# Patient Record
Sex: MALE | Race: WHITE | NOT HISPANIC OR LATINO | URBAN - METROPOLITAN AREA
[De-identification: names, ages, dates, MRNs, and addresses within clinical notes are randomized per-mention and may not be internally consistent; named-entity substitution may affect disease eponyms.]

---

## 2017-03-27 ENCOUNTER — APPOINTMENT (RX ONLY)
Dept: URBAN - METROPOLITAN AREA CLINIC 61 | Facility: CLINIC | Age: 67
Setting detail: DERMATOLOGY
End: 2017-03-27

## 2017-03-27 DIAGNOSIS — L57.0 ACTINIC KERATOSIS: ICD-10-CM

## 2017-03-27 DIAGNOSIS — D18.0 HEMANGIOMA: ICD-10-CM

## 2017-03-27 DIAGNOSIS — D485 NEOPLASM OF UNCERTAIN BEHAVIOR OF SKIN: ICD-10-CM

## 2017-03-27 PROBLEM — D18.01 HEMANGIOMA OF SKIN AND SUBCUTANEOUS TISSUE: Status: ACTIVE | Noted: 2017-03-27

## 2017-03-27 PROBLEM — D48.5 NEOPLASM OF UNCERTAIN BEHAVIOR OF SKIN: Status: ACTIVE | Noted: 2017-03-27

## 2017-03-27 PROCEDURE — ? BIOPSY BY SHAVE METHOD

## 2017-03-27 PROCEDURE — 11100: CPT | Mod: 59

## 2017-03-27 PROCEDURE — 17003 DESTRUCT PREMALG LES 2-14: CPT

## 2017-03-27 PROCEDURE — 99203 OFFICE O/P NEW LOW 30 MIN: CPT | Mod: 25

## 2017-03-27 PROCEDURE — ? LIQUID NITROGEN

## 2017-03-27 PROCEDURE — ? COUNSELING

## 2017-03-27 PROCEDURE — 17000 DESTRUCT PREMALG LESION: CPT

## 2017-03-27 ASSESSMENT — LOCATION SIMPLE DESCRIPTION DERM
LOCATION SIMPLE: SCALP
LOCATION SIMPLE: SCALP
LOCATION SIMPLE: POSTERIOR NECK
LOCATION SIMPLE: POSTERIOR SCALP
LOCATION SIMPLE: ANTERIOR SCALP
LOCATION SIMPLE: ANTERIOR SCALP
LOCATION SIMPLE: LEFT SCALP

## 2017-03-27 ASSESSMENT — LOCATION DETAILED DESCRIPTION DERM
LOCATION DETAILED: RIGHT SUPERIOR PARIETAL SCALP
LOCATION DETAILED: POSTERIOR MID-PARIETAL SCALP
LOCATION DETAILED: LEFT SUPERIOR PARIETAL SCALP
LOCATION DETAILED: RIGHT CENTRAL PARIETAL SCALP
LOCATION DETAILED: MID-FRONTAL SCALP
LOCATION DETAILED: LEFT CENTRAL PARIETAL SCALP
LOCATION DETAILED: LEFT CENTRAL FRONTAL SCALP
LOCATION DETAILED: LEFT INFERIOR POSTERIOR NECK
LOCATION DETAILED: MID-FRONTAL SCALP
LOCATION DETAILED: RIGHT CENTRAL PARIETAL SCALP
LOCATION DETAILED: RIGHT SUPERIOR PARIETAL SCALP
LOCATION DETAILED: LEFT CENTRAL PARIETAL SCALP

## 2017-03-27 ASSESSMENT — LOCATION ZONE DERM
LOCATION ZONE: SCALP
LOCATION ZONE: NECK
LOCATION ZONE: SCALP

## 2017-03-27 NOTE — PROCEDURE: LIQUID NITROGEN
Render Post-Care Instructions In Note?: yes
Post-Care Instructions: I reviewed with the patient in detail post-care instructions. Patient is to wear sunprotection, and avoid picking at any of the treated lesions. Pt may apply Vaseline to crusted or scabbing areas.
Consent: The patient's consent was obtained including but not limited to risks of crusting, scabbing, blistering, scarring, darker or lighter pigmentary change, recurrence, incomplete removal and infection.
Duration Of Freeze Thaw-Cycle (Seconds): 3
Total Number Of Aks Treated: 7
Number Of Freeze-Thaw Cycles: 1 freeze-thaw cycle
Detail Level: Zone

## 2017-03-27 NOTE — PROCEDURE: BIOPSY BY SHAVE METHOD
Detail Level: Detailed
Destruction After The Procedure: No
Render Post-Care Instructions In Note?: yes
Type Of Destruction Used: Curettage
Path Notes (To The Dermatopathologist): size: 0.6cm R/O: BCC
Biopsy Method: Dermablade
Hemostasis: Drysol
Consent: Written consent was obtained and risks were reviewed including but not limited to scarring, infection, bleeding, scabbing, incomplete removal, nerve damage and allergy to anesthesia.
Cryotherapy Text: The wound bed was treated with cryotherapy after the biopsy was performed.
Notification Instructions: Patient will be notified of biopsy results. However, patient instructed to call the office if not contacted within 2 weeks.
Body Location Override (Optional - Billing Will Still Be Based On Selected Body Map Location If Applicable): left posterior neck
X Size Of Lesion In Cm: 0
Wound Care: Bacitracin
Post-Care Instructions: I reviewed with the patient in detail post-care instructions. Patient is to keep the biopsy site dry overnight, and then apply bacitracin twice daily until healed. Patient may apply hydrogen peroxide soaks to remove any crusting.
Anesthesia Type: 1% lidocaine with epinephrine
Lab: [id number] [facility]
Electrodesiccation And Curettage Text: The wound bed was treated with electrodesiccation and curettage after the biopsy was performed.
Billing Type: [employer]
Dressing: bandage
Anesthesia Volume In Cc (Will Not Render If 0): 1
Electrodesiccation Text: The wound bed was treated with electrodesiccation after the biopsy was performed.
Biopsy Type: H and E
Size Of Lesion In Cm: 0.6
Silver Nitrate Text: The wound bed was treated with silver nitrate after the biopsy was performed.
Lab Facility: [address] [place] [address]

## 2017-04-05 ENCOUNTER — APPOINTMENT (RX ONLY)
Dept: URBAN - METROPOLITAN AREA CLINIC 58 | Facility: CLINIC | Age: 67
Setting detail: DERMATOLOGY
End: 2017-04-05

## 2017-04-05 DIAGNOSIS — L81.4 OTHER MELANIN HYPERPIGMENTATION: ICD-10-CM

## 2017-04-05 DIAGNOSIS — L57.0 ACTINIC KERATOSIS: ICD-10-CM

## 2017-04-05 PROCEDURE — ? COUNSELING

## 2017-04-05 PROCEDURE — ? LIQUID NITROGEN

## 2017-04-05 PROCEDURE — 17000 DESTRUCT PREMALG LESION: CPT | Mod: 79

## 2017-04-05 PROCEDURE — 99213 OFFICE O/P EST LOW 20 MIN: CPT | Mod: 24,25

## 2017-04-05 ASSESSMENT — LOCATION SIMPLE DESCRIPTION DERM
LOCATION SIMPLE: POSTERIOR NECK
LOCATION SIMPLE: POSTERIOR NECK

## 2017-04-05 ASSESSMENT — LOCATION ZONE DERM
LOCATION ZONE: NECK
LOCATION ZONE: NECK

## 2017-04-05 ASSESSMENT — LOCATION DETAILED DESCRIPTION DERM
LOCATION DETAILED: LEFT POSTERIOR NECK
LOCATION DETAILED: LEFT POSTERIOR NECK

## 2017-04-05 NOTE — PROCEDURE: LIQUID NITROGEN
Render Post-Care Instructions In Note?: yes
Number Of Freeze-Thaw Cycles: 2 freeze-thaw cycles
Consent: The patient's consent was obtained including but not limited to risks of crusting, scabbing, blistering, scarring, darker or lighter pigmentary change, recurrence, incomplete removal and infection.
Detail Level: Zone
Post-Care Instructions: I reviewed with the patient in detail post-care instructions. Patient is to wear sunprotection, and avoid picking at any of the treated lesions. Pt may apply Vaseline to crusted or scabbing areas.
Duration Of Freeze Thaw-Cycle (Seconds): 3

## 2017-10-04 ENCOUNTER — APPOINTMENT (RX ONLY)
Dept: URBAN - METROPOLITAN AREA CLINIC 61 | Facility: CLINIC | Age: 67
Setting detail: DERMATOLOGY
End: 2017-10-04

## 2017-10-04 DIAGNOSIS — L57.0 ACTINIC KERATOSIS: ICD-10-CM

## 2017-10-04 DIAGNOSIS — L82.1 OTHER SEBORRHEIC KERATOSIS: ICD-10-CM

## 2017-10-04 PROCEDURE — ? COUNSELING

## 2017-10-04 PROCEDURE — 99213 OFFICE O/P EST LOW 20 MIN: CPT | Mod: 25

## 2017-10-04 PROCEDURE — 17000 DESTRUCT PREMALG LESION: CPT

## 2017-10-04 PROCEDURE — ? LIQUID NITROGEN

## 2017-10-04 PROCEDURE — 17003 DESTRUCT PREMALG LES 2-14: CPT

## 2017-10-04 ASSESSMENT — LOCATION SIMPLE DESCRIPTION DERM
LOCATION SIMPLE: SCALP
LOCATION SIMPLE: LEFT FOREHEAD
LOCATION SIMPLE: RIGHT FOREHEAD
LOCATION SIMPLE: LEFT FOREHEAD
LOCATION SIMPLE: SCALP
LOCATION SIMPLE: POSTERIOR SCALP
LOCATION SIMPLE: POSTERIOR SCALP
LOCATION SIMPLE: RIGHT FOREHEAD
LOCATION SIMPLE: RIGHT UPPER BACK
LOCATION SIMPLE: LEFT UPPER BACK

## 2017-10-04 ASSESSMENT — LOCATION DETAILED DESCRIPTION DERM
LOCATION DETAILED: RIGHT CENTRAL PARIETAL SCALP
LOCATION DETAILED: LEFT SUPERIOR UPPER BACK
LOCATION DETAILED: MID-OCCIPITAL SCALP
LOCATION DETAILED: RIGHT CENTRAL PARIETAL SCALP
LOCATION DETAILED: LEFT SUPERIOR FOREHEAD
LOCATION DETAILED: RIGHT SUPERIOR MEDIAL FOREHEAD
LOCATION DETAILED: RIGHT INFERIOR UPPER BACK
LOCATION DETAILED: MID-OCCIPITAL SCALP
LOCATION DETAILED: LEFT SUPERIOR FOREHEAD
LOCATION DETAILED: POSTERIOR MID-PARIETAL SCALP
LOCATION DETAILED: LEFT SUPERIOR PARIETAL SCALP
LOCATION DETAILED: RIGHT SUPERIOR FOREHEAD

## 2017-10-04 ASSESSMENT — LOCATION ZONE DERM
LOCATION ZONE: FACE
LOCATION ZONE: SCALP
LOCATION ZONE: FACE
LOCATION ZONE: TRUNK
LOCATION ZONE: SCALP

## 2017-10-04 NOTE — PROCEDURE: LIQUID NITROGEN
Consent: The patient's consent was obtained including but not limited to risks of crusting, scabbing, blistering, scarring, darker or lighter pigmentary change, recurrence, incomplete removal and infection.
Number Of Freeze-Thaw Cycles: 3 freeze-thaw cycles
Total Number Of Aks Treated: 6
Render Post-Care Instructions In Note?: no
Detail Level: Zone
Post-Care Instructions: I reviewed with the patient in detail post-care instructions. Patient is to wear sunprotection, and avoid picking at any of the treated lesions. Pt may apply Vaseline to crusted or scabbing areas.
Duration Of Freeze Thaw-Cycle (Seconds): 2

## 2019-07-24 ENCOUNTER — APPOINTMENT (OUTPATIENT)
Dept: RADIOLOGY | Facility: MEDICAL CENTER | Age: 69
DRG: 981 | End: 2019-07-24
Attending: EMERGENCY MEDICINE
Payer: MEDICARE

## 2019-07-24 ENCOUNTER — HOSPITAL ENCOUNTER (INPATIENT)
Facility: MEDICAL CENTER | Age: 69
LOS: 7 days | DRG: 981 | End: 2019-07-31
Attending: EMERGENCY MEDICINE | Admitting: HOSPITALIST
Payer: MEDICARE

## 2019-07-24 DIAGNOSIS — L89.154 PRESSURE INJURY OF SACRAL REGION, STAGE 4 (HCC): ICD-10-CM

## 2019-07-24 DIAGNOSIS — G83.9 PARESIS (HCC): ICD-10-CM

## 2019-07-24 DIAGNOSIS — M86.18 OTHER ACUTE OSTEOMYELITIS, OTHER SITE (HCC): ICD-10-CM

## 2019-07-24 PROBLEM — I48.92 PAROXYSMAL ATRIAL FLUTTER (HCC): Status: ACTIVE | Noted: 2019-07-24

## 2019-07-24 PROBLEM — G82.20 PARAPLEGIA (HCC): Status: ACTIVE | Noted: 2019-07-24

## 2019-07-24 PROBLEM — N31.9 NEUROGENIC BLADDER: Status: ACTIVE | Noted: 2019-07-24

## 2019-07-24 PROBLEM — I10 ESSENTIAL HYPERTENSION: Status: ACTIVE | Noted: 2019-07-24

## 2019-07-24 PROBLEM — M86.9 OSTEOMYELITIS OF COCCYX (HCC): Status: ACTIVE | Noted: 2019-07-24

## 2019-07-24 PROBLEM — M46.28 OSTEOMYELITIS OF COCCYX (HCC): Status: ACTIVE | Noted: 2019-07-24

## 2019-07-24 LAB
ALBUMIN SERPL BCP-MCNC: 2.9 G/DL (ref 3.2–4.9)
ALBUMIN/GLOB SERPL: 0.8 G/DL
ALP SERPL-CCNC: 71 U/L (ref 30–99)
ALT SERPL-CCNC: 16 U/L (ref 2–50)
ANION GAP SERPL CALC-SCNC: 7 MMOL/L (ref 0–11.9)
ANISOCYTOSIS BLD QL SMEAR: ABNORMAL
APPEARANCE UR: CLEAR
AST SERPL-CCNC: 10 U/L (ref 12–45)
BASOPHILS # BLD AUTO: 0.3 % (ref 0–1.8)
BASOPHILS # BLD: 0.02 K/UL (ref 0–0.12)
BILIRUB SERPL-MCNC: 0.4 MG/DL (ref 0.1–1.5)
BILIRUB UR QL STRIP.AUTO: NEGATIVE
BUN SERPL-MCNC: 10 MG/DL (ref 8–22)
CALCIUM SERPL-MCNC: 8.6 MG/DL (ref 8.5–10.5)
CHLORIDE SERPL-SCNC: 104 MMOL/L (ref 96–112)
CO2 SERPL-SCNC: 26 MMOL/L (ref 20–33)
COLOR UR: YELLOW
COMMENT 1642: NORMAL
CREAT SERPL-MCNC: 0.51 MG/DL (ref 0.5–1.4)
CRP SERPL HS-MCNC: 139.3 MG/L (ref 0–7.5)
EOSINOPHIL # BLD AUTO: 0.07 K/UL (ref 0–0.51)
EOSINOPHIL NFR BLD: 0.9 % (ref 0–6.9)
ERYTHROCYTE [DISTWIDTH] IN BLOOD BY AUTOMATED COUNT: 54.4 FL (ref 35.9–50)
ERYTHROCYTE [SEDIMENTATION RATE] IN BLOOD BY WESTERGREN METHOD: 66 MM/HOUR (ref 0–20)
GLOBULIN SER CALC-MCNC: 3.7 G/DL (ref 1.9–3.5)
GLUCOSE SERPL-MCNC: 107 MG/DL (ref 65–99)
GLUCOSE UR STRIP.AUTO-MCNC: NEGATIVE MG/DL
GRAM STN SPEC: NORMAL
HCT VFR BLD AUTO: 28.2 % (ref 42–52)
HGB BLD-MCNC: 8.2 G/DL (ref 14–18)
HYPOCHROMIA BLD QL SMEAR: ABNORMAL
IMM GRANULOCYTES # BLD AUTO: 0.05 K/UL (ref 0–0.11)
IMM GRANULOCYTES NFR BLD AUTO: 0.7 % (ref 0–0.9)
KETONES UR STRIP.AUTO-MCNC: NEGATIVE MG/DL
LACTATE BLD-SCNC: 1.2 MMOL/L (ref 0.5–2)
LACTATE BLD-SCNC: 1.3 MMOL/L (ref 0.5–2)
LEUKOCYTE ESTERASE UR QL STRIP.AUTO: NEGATIVE
LYMPHOCYTES # BLD AUTO: 1.09 K/UL (ref 1–4.8)
LYMPHOCYTES NFR BLD: 14.8 % (ref 22–41)
MCH RBC QN AUTO: 24.5 PG (ref 27–33)
MCHC RBC AUTO-ENTMCNC: 29.1 G/DL (ref 33.7–35.3)
MCV RBC AUTO: 84.2 FL (ref 81.4–97.8)
MICRO URNS: NORMAL
MICROCYTES BLD QL SMEAR: ABNORMAL
MONOCYTES # BLD AUTO: 0.76 K/UL (ref 0–0.85)
MONOCYTES NFR BLD AUTO: 10.3 % (ref 0–13.4)
MORPHOLOGY BLD-IMP: NORMAL
NEUTROPHILS # BLD AUTO: 5.39 K/UL (ref 1.82–7.42)
NEUTROPHILS NFR BLD: 73 % (ref 44–72)
NITRITE UR QL STRIP.AUTO: NEGATIVE
NRBC # BLD AUTO: 0 K/UL
NRBC BLD-RTO: 0 /100 WBC
OVALOCYTES BLD QL SMEAR: NORMAL
PH UR STRIP.AUTO: 6.5 [PH]
PLATELET # BLD AUTO: 387 K/UL (ref 164–446)
PLATELET BLD QL SMEAR: NORMAL
PMV BLD AUTO: 8.5 FL (ref 9–12.9)
POIKILOCYTOSIS BLD QL SMEAR: NORMAL
POTASSIUM SERPL-SCNC: 3.7 MMOL/L (ref 3.6–5.5)
PROT SERPL-MCNC: 6.6 G/DL (ref 6–8.2)
PROT UR QL STRIP: NEGATIVE MG/DL
RBC # BLD AUTO: 3.35 M/UL (ref 4.7–6.1)
RBC BLD AUTO: PRESENT
RBC UR QL AUTO: NEGATIVE
SIGNIFICANT IND 70042: NORMAL
SITE SITE: NORMAL
SODIUM SERPL-SCNC: 137 MMOL/L (ref 135–145)
SOURCE SOURCE: NORMAL
SP GR UR STRIP.AUTO: 1.01
UROBILINOGEN UR STRIP.AUTO-MCNC: 0.2 MG/DL
WBC # BLD AUTO: 7.4 K/UL (ref 4.8–10.8)

## 2019-07-24 PROCEDURE — 99223 1ST HOSP IP/OBS HIGH 75: CPT | Performed by: HOSPITALIST

## 2019-07-24 PROCEDURE — 87186 SC STD MICRODIL/AGAR DIL: CPT

## 2019-07-24 PROCEDURE — 700111 HCHG RX REV CODE 636 W/ 250 OVERRIDE (IP): Performed by: EMERGENCY MEDICINE

## 2019-07-24 PROCEDURE — 87205 SMEAR GRAM STAIN: CPT

## 2019-07-24 PROCEDURE — 770006 HCHG ROOM/CARE - MED/SURG/GYN SEMI*

## 2019-07-24 PROCEDURE — 81003 URINALYSIS AUTO W/O SCOPE: CPT

## 2019-07-24 PROCEDURE — 80053 COMPREHEN METABOLIC PANEL: CPT

## 2019-07-24 PROCEDURE — 85025 COMPLETE CBC W/AUTO DIFF WBC: CPT

## 2019-07-24 PROCEDURE — 87070 CULTURE OTHR SPECIMN AEROBIC: CPT

## 2019-07-24 PROCEDURE — 87040 BLOOD CULTURE FOR BACTERIA: CPT

## 2019-07-24 PROCEDURE — 71045 X-RAY EXAM CHEST 1 VIEW: CPT

## 2019-07-24 PROCEDURE — 99285 EMERGENCY DEPT VISIT HI MDM: CPT

## 2019-07-24 PROCEDURE — 700102 HCHG RX REV CODE 250 W/ 637 OVERRIDE(OP): Performed by: HOSPITALIST

## 2019-07-24 PROCEDURE — 85652 RBC SED RATE AUTOMATED: CPT

## 2019-07-24 PROCEDURE — A9270 NON-COVERED ITEM OR SERVICE: HCPCS | Performed by: HOSPITALIST

## 2019-07-24 PROCEDURE — 96375 TX/PRO/DX INJ NEW DRUG ADDON: CPT

## 2019-07-24 PROCEDURE — E0191 PROTECTOR HEEL OR ELBOW: HCPCS | Performed by: HOSPITALIST

## 2019-07-24 PROCEDURE — 96365 THER/PROPH/DIAG IV INF INIT: CPT

## 2019-07-24 PROCEDURE — 0DQE4ZZ REPAIR LARGE INTESTINE, PERCUTANEOUS ENDOSCOPIC APPROACH: ICD-10-PCS | Performed by: SURGERY

## 2019-07-24 PROCEDURE — 700105 HCHG RX REV CODE 258: Performed by: EMERGENCY MEDICINE

## 2019-07-24 PROCEDURE — 83605 ASSAY OF LACTIC ACID: CPT

## 2019-07-24 PROCEDURE — 700111 HCHG RX REV CODE 636 W/ 250 OVERRIDE (IP): Performed by: HOSPITALIST

## 2019-07-24 PROCEDURE — 36415 COLL VENOUS BLD VENIPUNCTURE: CPT

## 2019-07-24 PROCEDURE — 302146: Performed by: HOSPITALIST

## 2019-07-24 PROCEDURE — 87086 URINE CULTURE/COLONY COUNT: CPT

## 2019-07-24 PROCEDURE — 86141 C-REACTIVE PROTEIN HS: CPT

## 2019-07-24 PROCEDURE — 0D1N4Z4 BYPASS SIGMOID COLON TO CUTANEOUS, PERCUTANEOUS ENDOSCOPIC APPROACH: ICD-10-PCS | Performed by: SURGERY

## 2019-07-24 PROCEDURE — 700105 HCHG RX REV CODE 258: Performed by: HOSPITALIST

## 2019-07-24 RX ORDER — CIPROFLOXACIN 500 MG/1
500 TABLET, FILM COATED ORAL 2 TIMES DAILY
Status: ON HOLD | COMMUNITY
Start: 2019-07-22 | End: 2019-07-31

## 2019-07-24 RX ORDER — LACTOBACILLUS RHAMNOSUS GG 10B CELL
1 CAPSULE ORAL DAILY
COMMUNITY
End: 2019-12-06

## 2019-07-24 RX ORDER — BACLOFEN 10 MG/1
10 TABLET ORAL 4 TIMES DAILY
Status: DISCONTINUED | OUTPATIENT
Start: 2019-07-24 | End: 2019-07-31 | Stop reason: HOSPADM

## 2019-07-24 RX ORDER — HYDROMORPHONE HYDROCHLORIDE 1 MG/ML
0.25 INJECTION, SOLUTION INTRAMUSCULAR; INTRAVENOUS; SUBCUTANEOUS
Status: DISCONTINUED | OUTPATIENT
Start: 2019-07-24 | End: 2019-07-31 | Stop reason: HOSPADM

## 2019-07-24 RX ORDER — BISACODYL 10 MG
10 SUPPOSITORY, RECTAL RECTAL
Status: DISCONTINUED | OUTPATIENT
Start: 2019-07-24 | End: 2019-07-24

## 2019-07-24 RX ORDER — FLECAINIDE ACETATE 50 MG/1
25 TABLET ORAL 2 TIMES DAILY
Status: ON HOLD | COMMUNITY
End: 2019-12-23

## 2019-07-24 RX ORDER — MULTIVIT WITH MINERALS/LUTEIN
1000 TABLET ORAL DAILY
Status: ON HOLD | COMMUNITY
End: 2019-08-20

## 2019-07-24 RX ORDER — LOSARTAN POTASSIUM 50 MG/1
25 TABLET ORAL DAILY
Status: DISCONTINUED | OUTPATIENT
Start: 2019-07-24 | End: 2019-07-31 | Stop reason: HOSPADM

## 2019-07-24 RX ORDER — BISACODYL 10 MG
10 SUPPOSITORY, RECTAL RECTAL DAILY
Status: ON HOLD | COMMUNITY
End: 2019-08-20

## 2019-07-24 RX ORDER — POLYETHYLENE GLYCOL 3350 17 G/17G
1 POWDER, FOR SOLUTION ORAL DAILY
Status: DISCONTINUED | OUTPATIENT
Start: 2019-07-24 | End: 2019-07-26

## 2019-07-24 RX ORDER — BISACODYL 10 MG
10 SUPPOSITORY, RECTAL RECTAL
Status: DISCONTINUED | OUTPATIENT
Start: 2019-07-24 | End: 2019-07-26

## 2019-07-24 RX ORDER — ONDANSETRON 2 MG/ML
4 INJECTION INTRAMUSCULAR; INTRAVENOUS EVERY 4 HOURS PRN
Status: DISCONTINUED | OUTPATIENT
Start: 2019-07-24 | End: 2019-07-31 | Stop reason: HOSPADM

## 2019-07-24 RX ORDER — AMOXICILLIN 250 MG
2 CAPSULE ORAL 2 TIMES DAILY
Status: DISCONTINUED | OUTPATIENT
Start: 2019-07-24 | End: 2019-07-26

## 2019-07-24 RX ORDER — POLYETHYLENE GLYCOL 3350 17 G/17G
1 POWDER, FOR SOLUTION ORAL
Status: DISCONTINUED | OUTPATIENT
Start: 2019-07-24 | End: 2019-07-24

## 2019-07-24 RX ORDER — TRAMADOL HYDROCHLORIDE 50 MG/1
50-100 TABLET ORAL EVERY 4 HOURS PRN
COMMUNITY
End: 2019-07-24

## 2019-07-24 RX ORDER — LOSARTAN POTASSIUM 25 MG/1
25 TABLET ORAL DAILY
COMMUNITY
End: 2019-07-24

## 2019-07-24 RX ORDER — FLECAINIDE ACETATE 50 MG/1
25 TABLET ORAL 2 TIMES DAILY
COMMUNITY
End: 2019-07-24

## 2019-07-24 RX ORDER — M-VIT,TX,IRON,MINS/CALC/FOLIC 27MG-0.4MG
1 TABLET ORAL DAILY
COMMUNITY
End: 2022-01-21

## 2019-07-24 RX ORDER — FINASTERIDE 5 MG/1
5 TABLET, FILM COATED ORAL DAILY
Status: DISCONTINUED | OUTPATIENT
Start: 2019-07-24 | End: 2019-07-31 | Stop reason: HOSPADM

## 2019-07-24 RX ORDER — M-VIT,TX,IRON,MINS/CALC/FOLIC 27MG-0.4MG
1 TABLET ORAL DAILY
Status: DISCONTINUED | OUTPATIENT
Start: 2019-07-24 | End: 2019-07-31 | Stop reason: HOSPADM

## 2019-07-24 RX ORDER — TRAZODONE HYDROCHLORIDE 50 MG/1
50 TABLET ORAL
Status: DISCONTINUED | OUTPATIENT
Start: 2019-07-24 | End: 2019-07-26

## 2019-07-24 RX ORDER — TIZANIDINE 4 MG/1
4 TABLET ORAL 3 TIMES DAILY PRN
COMMUNITY
End: 2019-07-24

## 2019-07-24 RX ORDER — FLECAINIDE ACETATE 50 MG/1
25 TABLET ORAL 2 TIMES DAILY
Status: DISCONTINUED | OUTPATIENT
Start: 2019-07-24 | End: 2019-07-31 | Stop reason: HOSPADM

## 2019-07-24 RX ORDER — CHOLECALCIFEROL (VITAMIN D3) 125 MCG
5 CAPSULE ORAL
Status: DISCONTINUED | OUTPATIENT
Start: 2019-07-24 | End: 2019-07-24

## 2019-07-24 RX ORDER — ACETAMINOPHEN 325 MG/1
650 TABLET ORAL EVERY 6 HOURS PRN
Status: DISCONTINUED | OUTPATIENT
Start: 2019-07-24 | End: 2019-07-31 | Stop reason: HOSPADM

## 2019-07-24 RX ORDER — TRAZODONE HYDROCHLORIDE 50 MG/1
50 TABLET ORAL
Status: ON HOLD | COMMUNITY
End: 2019-08-20

## 2019-07-24 RX ORDER — LACTOBACILLUS RHAMNOSUS GG 10B CELL
1 CAPSULE ORAL
Status: DISCONTINUED | OUTPATIENT
Start: 2019-07-25 | End: 2019-07-31 | Stop reason: HOSPADM

## 2019-07-24 RX ORDER — METOPROLOL SUCCINATE 25 MG/1
25 TABLET, EXTENDED RELEASE ORAL 2 TIMES DAILY
COMMUNITY
End: 2019-07-24

## 2019-07-24 RX ORDER — PANTOPRAZOLE SODIUM 40 MG/1
40 TABLET, DELAYED RELEASE ORAL DAILY
Status: ON HOLD | COMMUNITY
End: 2019-08-20

## 2019-07-24 RX ORDER — ONDANSETRON 4 MG/1
4 TABLET, ORALLY DISINTEGRATING ORAL EVERY 4 HOURS PRN
Status: DISCONTINUED | OUTPATIENT
Start: 2019-07-24 | End: 2019-07-31 | Stop reason: HOSPADM

## 2019-07-24 RX ORDER — BACLOFEN 20 MG/1
20 TABLET ORAL 4 TIMES DAILY
COMMUNITY
End: 2022-04-14 | Stop reason: SDUPTHER

## 2019-07-24 RX ORDER — POLYETHYLENE GLYCOL 3350 17 G/17G
17 POWDER, FOR SOLUTION ORAL DAILY
Status: ON HOLD | COMMUNITY
End: 2019-08-20

## 2019-07-24 RX ORDER — CELECOXIB 200 MG/1
200 CAPSULE ORAL DAILY
COMMUNITY
End: 2019-12-06

## 2019-07-24 RX ORDER — CELECOXIB 200 MG/1
200 CAPSULE ORAL DAILY
Status: DISCONTINUED | OUTPATIENT
Start: 2019-07-24 | End: 2019-07-31 | Stop reason: HOSPADM

## 2019-07-24 RX ORDER — OXYCODONE HYDROCHLORIDE 5 MG/1
2.5 TABLET ORAL
Status: DISCONTINUED | OUTPATIENT
Start: 2019-07-24 | End: 2019-07-31 | Stop reason: HOSPADM

## 2019-07-24 RX ORDER — LACTOBACILLUS RHAMNOSUS GG 10B CELL
1 CAPSULE ORAL DAILY
Status: DISCONTINUED | OUTPATIENT
Start: 2019-07-24 | End: 2019-07-24

## 2019-07-24 RX ORDER — CHOLECALCIFEROL (VITAMIN D3) 125 MCG
5 CAPSULE ORAL
Status: ON HOLD | COMMUNITY
End: 2019-08-20

## 2019-07-24 RX ORDER — OXYCODONE HYDROCHLORIDE 5 MG/1
5 TABLET ORAL
Status: DISCONTINUED | OUTPATIENT
Start: 2019-07-24 | End: 2019-07-31 | Stop reason: HOSPADM

## 2019-07-24 RX ORDER — LOSARTAN POTASSIUM 50 MG/1
50 TABLET ORAL DAILY
Status: ON HOLD | COMMUNITY
End: 2022-01-31

## 2019-07-24 RX ORDER — ASCORBIC ACID 500 MG
1000 TABLET ORAL DAILY
Status: DISCONTINUED | OUTPATIENT
Start: 2019-07-24 | End: 2019-07-31 | Stop reason: HOSPADM

## 2019-07-24 RX ORDER — FINASTERIDE 5 MG/1
5 TABLET, FILM COATED ORAL DAILY
COMMUNITY
End: 2020-12-18

## 2019-07-24 RX ORDER — CELECOXIB 200 MG/1
200 CAPSULE ORAL DAILY
COMMUNITY
End: 2019-07-24

## 2019-07-24 RX ORDER — BACLOFEN 10 MG/1
10 TABLET ORAL 4 TIMES DAILY
COMMUNITY
End: 2019-07-24

## 2019-07-24 RX ADMIN — MULTIPLE VITAMINS W/ MINERALS TAB 1 TABLET: TAB at 16:29

## 2019-07-24 RX ADMIN — METOPROLOL TARTRATE 25 MG: 25 TABLET, FILM COATED ORAL at 16:30

## 2019-07-24 RX ADMIN — FINASTERIDE 5 MG: 5 TABLET, FILM COATED ORAL at 16:30

## 2019-07-24 RX ADMIN — BACLOFEN 10 MG: 10 TABLET ORAL at 20:27

## 2019-07-24 RX ADMIN — ENOXAPARIN SODIUM 40 MG: 100 INJECTION SUBCUTANEOUS at 16:33

## 2019-07-24 RX ADMIN — PIPERACILLIN AND TAZOBACTAM 3.38 G: 3; .375 INJECTION, POWDER, LYOPHILIZED, FOR SOLUTION INTRAVENOUS; PARENTERAL at 18:10

## 2019-07-24 RX ADMIN — VANCOMYCIN HYDROCHLORIDE 2200 MG: 500 INJECTION, POWDER, LYOPHILIZED, FOR SOLUTION INTRAVENOUS at 14:16

## 2019-07-24 RX ADMIN — LOSARTAN POTASSIUM 25 MG: 25 TABLET ORAL at 16:30

## 2019-07-24 RX ADMIN — AMPICILLIN SODIUM AND SULBACTAM SODIUM 3 G: 2; 1 INJECTION, POWDER, FOR SOLUTION INTRAMUSCULAR; INTRAVENOUS at 13:25

## 2019-07-24 RX ADMIN — PIPERACILLIN AND TAZOBACTAM 3.38 G: 3; .375 INJECTION, POWDER, LYOPHILIZED, FOR SOLUTION INTRAVENOUS; PARENTERAL at 20:27

## 2019-07-24 RX ADMIN — CELECOXIB 200 MG: 200 CAPSULE ORAL at 18:04

## 2019-07-24 RX ADMIN — OXYCODONE HYDROCHLORIDE AND ACETAMINOPHEN 1000 MG: 500 TABLET ORAL at 16:29

## 2019-07-24 RX ADMIN — FLECAINIDE ACETATE 25 MG: 50 TABLET ORAL at 18:04

## 2019-07-24 RX ADMIN — TRAZODONE HYDROCHLORIDE 50 MG: 50 TABLET ORAL at 22:14

## 2019-07-24 RX ADMIN — BACLOFEN 10 MG: 10 TABLET ORAL at 16:29

## 2019-07-24 ASSESSMENT — COGNITIVE AND FUNCTIONAL STATUS - GENERAL
EATING MEALS: A LITTLE
HELP NEEDED FOR BATHING: TOTAL
TOILETING: TOTAL
SUGGESTED CMS G CODE MODIFIER MOBILITY: CM
PERSONAL GROOMING: A LOT
MOVING TO AND FROM BED TO CHAIR: UNABLE
SUGGESTED CMS G CODE MODIFIER DAILY ACTIVITY: CL
WALKING IN HOSPITAL ROOM: TOTAL
DRESSING REGULAR UPPER BODY CLOTHING: A LOT
TURNING FROM BACK TO SIDE WHILE IN FLAT BAD: A LOT
STANDING UP FROM CHAIR USING ARMS: TOTAL
CLIMB 3 TO 5 STEPS WITH RAILING: TOTAL
MOVING FROM LYING ON BACK TO SITTING ON SIDE OF FLAT BED: UNABLE
DAILY ACTIVITIY SCORE: 10
MOBILITY SCORE: 7
DRESSING REGULAR LOWER BODY CLOTHING: TOTAL

## 2019-07-24 ASSESSMENT — LIFESTYLE VARIABLES
TOTAL SCORE: 0
ALCOHOL_USE: NO
CONSUMPTION TOTAL: INCOMPLETE
EVER FELT BAD OR GUILTY ABOUT YOUR DRINKING: NO
ALCOHOL_USE: YES
TOTAL SCORE: 0
TOTAL SCORE: 0
HAVE YOU EVER FELT YOU SHOULD CUT DOWN ON YOUR DRINKING: NO
EVER HAD A DRINK FIRST THING IN THE MORNING TO STEADY YOUR NERVES TO GET RID OF A HANGOVER: NO
HAVE PEOPLE ANNOYED YOU BY CRITICIZING YOUR DRINKING: NO
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
## 2019-07-24 NOTE — ASSESSMENT & PLAN NOTE
Patient initally started on IV Zosyn and vancomycin  Wound cx growing Streptococcus constellatus/milleri and MRSA and bacteroides.  Follow final results.   ID and wound care consulted.    Concern for OM on outpatient x-ray  Patient refusing MRI as he reports having metal in his body that is not compatible.  Refuses workup for compatibility.  IR unable to do bone bx because of overlying ulcer  Ortho consulted to evaluate for possible debridement and use of rongeurs to obtain bone sample   Plastic surgery consulted to evaluate for skin graft, Dr. Berumen recommends flap in about 1 month after reeval  General surgery consulted for diverting colostomy to promote wound healing.   Continue vancomycin and unasyn, monitor trough levels and renal function per ID. May need PICC for longterm antibiotics
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
## 2019-07-24 NOTE — ED NOTES
Pt states that he had this wound in his coccyx approximately 3-4 months ago and normally nurses takes care of his wound.

## 2019-07-24 NOTE — ED NOTES
Med Rec Updated and Complete per Pt's MAR from [facility]  Allergies Reviewed Historically    Pt appears to be on a course of Ciprofloxacin 500mg twice daily Starting 07/22/19 Due to End 08/02/19.    Pt on Xarelto 10mg daily with his last dose 07/23/19 @ 1144.

## 2019-07-24 NOTE — ED NOTES
Pt rolled in left side and wedge pillow placed behind buttock/back.  Arrived w/hutchins catheter. erp at bedside.
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
## 2019-07-25 PROBLEM — D64.9 ANEMIA: Status: ACTIVE | Noted: 2019-07-25

## 2019-07-25 LAB
ANION GAP SERPL CALC-SCNC: 8 MMOL/L (ref 0–11.9)
BASOPHILS # BLD AUTO: 0.3 % (ref 0–1.8)
BASOPHILS # BLD: 0.02 K/UL (ref 0–0.12)
BUN SERPL-MCNC: 9 MG/DL (ref 8–22)
CALCIUM SERPL-MCNC: 8.5 MG/DL (ref 8.5–10.5)
CHLORIDE SERPL-SCNC: 109 MMOL/L (ref 96–112)
CO2 SERPL-SCNC: 25 MMOL/L (ref 20–33)
CREAT SERPL-MCNC: 0.66 MG/DL (ref 0.5–1.4)
EOSINOPHIL # BLD AUTO: 0.15 K/UL (ref 0–0.51)
EOSINOPHIL NFR BLD: 2.2 % (ref 0–6.9)
ERYTHROCYTE [DISTWIDTH] IN BLOOD BY AUTOMATED COUNT: 54.6 FL (ref 35.9–50)
FOLATE SERPL-MCNC: >23.8 NG/ML
GLUCOSE SERPL-MCNC: 96 MG/DL (ref 65–99)
HCT VFR BLD AUTO: 26 % (ref 42–52)
HGB BLD-MCNC: 7.5 G/DL (ref 14–18)
HGB RETIC QN AUTO: 24.2 PG/CELL (ref 29–35)
IMM GRANULOCYTES # BLD AUTO: 0.06 K/UL (ref 0–0.11)
IMM GRANULOCYTES NFR BLD AUTO: 0.9 % (ref 0–0.9)
IMM RETICS NFR: 28.6 % (ref 9.3–17.4)
INR PPP: 1.26 (ref 0.87–1.13)
IRON SATN MFR SERPL: 12 % (ref 15–55)
IRON SERPL-MCNC: 21 UG/DL (ref 50–180)
LYMPHOCYTES # BLD AUTO: 1.28 K/UL (ref 1–4.8)
LYMPHOCYTES NFR BLD: 19.1 % (ref 22–41)
MCH RBC QN AUTO: 24.4 PG (ref 27–33)
MCHC RBC AUTO-ENTMCNC: 28.8 G/DL (ref 33.7–35.3)
MCV RBC AUTO: 84.4 FL (ref 81.4–97.8)
MONOCYTES # BLD AUTO: 0.6 K/UL (ref 0–0.85)
MONOCYTES NFR BLD AUTO: 9 % (ref 0–13.4)
NEUTROPHILS # BLD AUTO: 4.59 K/UL (ref 1.82–7.42)
NEUTROPHILS NFR BLD: 68.5 % (ref 44–72)
NRBC # BLD AUTO: 0 K/UL
NRBC BLD-RTO: 0 /100 WBC
PLATELET # BLD AUTO: 348 K/UL (ref 164–446)
PMV BLD AUTO: 8.3 FL (ref 9–12.9)
POTASSIUM SERPL-SCNC: 3.8 MMOL/L (ref 3.6–5.5)
PROTHROMBIN TIME: 16.1 SEC (ref 12–14.6)
RBC # BLD AUTO: 3.08 M/UL (ref 4.7–6.1)
RETICS # AUTO: 0.07 M/UL (ref 0.04–0.06)
RETICS/RBC NFR: 2.1 % (ref 0.8–2.1)
SODIUM SERPL-SCNC: 142 MMOL/L (ref 135–145)
TIBC SERPL-MCNC: 182 UG/DL (ref 250–450)
VIT B12 SERPL-MCNC: 918 PG/ML (ref 211–911)
WBC # BLD AUTO: 6.7 K/UL (ref 4.8–10.8)

## 2019-07-25 PROCEDURE — 36415 COLL VENOUS BLD VENIPUNCTURE: CPT

## 2019-07-25 PROCEDURE — A9270 NON-COVERED ITEM OR SERVICE: HCPCS | Performed by: HOSPITALIST

## 2019-07-25 PROCEDURE — 82607 VITAMIN B-12: CPT

## 2019-07-25 PROCEDURE — 700102 HCHG RX REV CODE 250 W/ 637 OVERRIDE(OP): Performed by: HOSPITALIST

## 2019-07-25 PROCEDURE — 99233 SBSQ HOSP IP/OBS HIGH 50: CPT | Performed by: FAMILY MEDICINE

## 2019-07-25 PROCEDURE — 83540 ASSAY OF IRON: CPT

## 2019-07-25 PROCEDURE — 302098 PASTE RING (FLAT): Performed by: FAMILY MEDICINE

## 2019-07-25 PROCEDURE — 83550 IRON BINDING TEST: CPT

## 2019-07-25 PROCEDURE — 85610 PROTHROMBIN TIME: CPT

## 2019-07-25 PROCEDURE — 700111 HCHG RX REV CODE 636 W/ 250 OVERRIDE (IP): Performed by: HOSPITALIST

## 2019-07-25 PROCEDURE — 85046 RETICYTE/HGB CONCENTRATE: CPT

## 2019-07-25 PROCEDURE — 82746 ASSAY OF FOLIC ACID SERUM: CPT

## 2019-07-25 PROCEDURE — 80048 BASIC METABOLIC PNL TOTAL CA: CPT

## 2019-07-25 PROCEDURE — 99223 1ST HOSP IP/OBS HIGH 75: CPT | Performed by: INTERNAL MEDICINE

## 2019-07-25 PROCEDURE — 85025 COMPLETE CBC W/AUTO DIFF WBC: CPT

## 2019-07-25 PROCEDURE — 770006 HCHG ROOM/CARE - MED/SURG/GYN SEMI*

## 2019-07-25 PROCEDURE — 306263 VAC CANNISTER W/GEL 500ML: Performed by: FAMILY MEDICINE

## 2019-07-25 PROCEDURE — 700105 HCHG RX REV CODE 258: Performed by: HOSPITALIST

## 2019-07-25 RX ADMIN — BACLOFEN 10 MG: 10 TABLET ORAL at 18:09

## 2019-07-25 RX ADMIN — METOPROLOL TARTRATE 25 MG: 25 TABLET, FILM COATED ORAL at 04:50

## 2019-07-25 RX ADMIN — Medication 1 CAPSULE: at 07:53

## 2019-07-25 RX ADMIN — BACLOFEN 10 MG: 10 TABLET ORAL at 07:53

## 2019-07-25 RX ADMIN — BACLOFEN 10 MG: 10 TABLET ORAL at 14:20

## 2019-07-25 RX ADMIN — PIPERACILLIN AND TAZOBACTAM 3.38 G: 3; .375 INJECTION, POWDER, LYOPHILIZED, FOR SOLUTION INTRAVENOUS; PARENTERAL at 04:52

## 2019-07-25 RX ADMIN — CELECOXIB 200 MG: 200 CAPSULE ORAL at 04:50

## 2019-07-25 RX ADMIN — ENOXAPARIN SODIUM 40 MG: 100 INJECTION SUBCUTANEOUS at 04:52

## 2019-07-25 RX ADMIN — LOSARTAN POTASSIUM 25 MG: 25 TABLET ORAL at 04:51

## 2019-07-25 RX ADMIN — FLECAINIDE ACETATE 25 MG: 50 TABLET ORAL at 18:09

## 2019-07-25 RX ADMIN — FLECAINIDE ACETATE 25 MG: 50 TABLET ORAL at 04:50

## 2019-07-25 RX ADMIN — TRAZODONE HYDROCHLORIDE 50 MG: 50 TABLET ORAL at 21:58

## 2019-07-25 RX ADMIN — OXYCODONE HYDROCHLORIDE AND ACETAMINOPHEN 1000 MG: 500 TABLET ORAL at 04:51

## 2019-07-25 RX ADMIN — POLYETHYLENE GLYCOL 3350 1 PACKET: 17 POWDER, FOR SOLUTION ORAL at 04:50

## 2019-07-25 RX ADMIN — BACLOFEN 10 MG: 10 TABLET ORAL at 20:29

## 2019-07-25 RX ADMIN — MULTIPLE VITAMINS W/ MINERALS TAB 1 TABLET: TAB at 04:51

## 2019-07-25 RX ADMIN — SENNOSIDES, DOCUSATE SODIUM 2 TABLET: 50; 8.6 TABLET, FILM COATED ORAL at 04:51

## 2019-07-25 RX ADMIN — FINASTERIDE 5 MG: 5 TABLET, FILM COATED ORAL at 04:51

## 2019-07-25 RX ADMIN — METOPROLOL TARTRATE 25 MG: 25 TABLET, FILM COATED ORAL at 18:10

## 2019-07-25 ASSESSMENT — ENCOUNTER SYMPTOMS
ABDOMINAL PAIN: 0
PALPITATIONS: 0
CONSTIPATION: 0
SPEECH CHANGE: 0
DIZZINESS: 0
TREMORS: 0
SENSORY CHANGE: 0
MYALGIAS: 0
DIARRHEA: 0
TINGLING: 0
CHILLS: 0
WEAKNESS: 0
FEVER: 0
NAUSEA: 0
BLURRED VISION: 0
COUGH: 0
VOMITING: 0
SHORTNESS OF BREATH: 0
HEADACHES: 0
DOUBLE VISION: 0

## 2019-07-25 ASSESSMENT — PATIENT HEALTH QUESTIONNAIRE - PHQ9
SUM OF ALL RESPONSES TO PHQ9 QUESTIONS 1 AND 2: 0
2. FEELING DOWN, DEPRESSED, IRRITABLE, OR HOPELESS: NOT AT ALL
1. LITTLE INTEREST OR PLEASURE IN DOING THINGS: NOT AT ALL

## 2019-07-25 NOTE — PROGRESS NOTES
"RN at bedside with pt. Pt is refusing MRI at this time states that he has personally spoken with \"Specialist\" who have stated that he cannot have an MRI. Pt is not interested in MRI screening at this time because he does not want the MRI.   "
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
## 2019-07-25 NOTE — DISCHARGE PLANNING
Care Transition Team Assessment    Patient was transferred from [facility] and would like to go go back when m/c.  Patient was living in [facility], plans on relocating to [facility], assisted living.  At this time has not found one he likes.      Information Source  Orientation : Oriented x 4  Who is responsible for making decisions for patient? : Patient    Readmission Evaluation  Is this a readmission?: No    Elopement Risk  Legal Hold: No  Ambulatory or Self Mobile in Wheelchair: No-Not an Elopement Risk  Elopement Risk: Not at Risk for Elopement    Interdisciplinary Discharge Planning  Patient or legal guardian wants to designate a caregiver (see row info): Yes  Caregiver name: lorraine gonsalez  Caregiver relationship to patient: daughter    Discharge Preparedness  What is your plan after discharge?: Skilled nursing facility  What are your discharge supports?: Child  Prior Functional Level: Wheelchair Dependent  Difficulity with ADLs: Bathing, Toileting, Walking  Difficulity with IADLs: Driving, Laundry, Shopping    Functional Assesment  Prior Functional Level: Wheelchair Dependent    Finances  Financial Barriers to Discharge: No  Prescription Coverage: Yes (AARP)              [facility]vance Directive  [facility] Directive?: POLST         Psychological Assessment  History of Substance Abuse: None  History of Psychiatric Problems: No  Non-compliant with Treatment: No  Newly Diagnosed Illness: No    Discharge Risks or Barriers  Discharge risks or barriers?: No PCP, Complex medical needs  Patient risk factors: Complex medical needs, No PCP    Anticipated Discharge Information  Anticipated discharge disposition: SNF  Discharge Address:  ([facility])

## 2019-07-25 NOTE — ASSESSMENT & PLAN NOTE
Iron levels low.  Continue oral replacement.   B12 and folate wnl.   No evidence of active bleed.

## 2019-07-25 NOTE — CARE PLAN
Problem: Safety  Goal: Will remain free from injury  Outcome: PROGRESSING AS EXPECTED  Para with limited sensations below the nipple line, turning and will need to advance to a speciality bed    Problem: Infection  Goal: Will remain free from infection  Outcome: PROGRESSING AS EXPECTED  Iv abx as ordered for wound to sacral area

## 2019-07-25 NOTE — PROGRESS NOTES
Alert and and able to let his needs known. Chronic hutchins in place, large stage 4 PU to his coccyx area; wound to his left back of calf, changed the dressing, took pictures, ordered a JERARDO mattress, turning in progress and opned ADL's. [place] to room, call light within reach and visual checks through the night
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
## 2019-07-25 NOTE — CARE PLAN
Problem: Communication  Goal: The ability to communicate needs accurately and effectively will improve    Intervention: [place] patient and significant other/support system to call light to alert staff of needs  Pt educated on the use of the call light to alert staff of any needs.       Problem: Knowledge Deficit  Goal: Knowledge of disease process/condition, treatment plan, diagnostic tests, and medications will improve    Intervention: Assess knowledge level of disease process/condition, treatment plan, diagnostic tests, and medications  Pt understands the plan of care and repeats the plan back to the nurse.
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
## 2019-07-26 ENCOUNTER — APPOINTMENT (OUTPATIENT)
Dept: RADIOLOGY | Facility: MEDICAL CENTER | Age: 69
DRG: 981 | End: 2019-07-26
Attending: NURSE PRACTITIONER
Payer: MEDICARE

## 2019-07-26 LAB
ANION GAP SERPL CALC-SCNC: 8 MMOL/L (ref 0–11.9)
BACTERIA UR CULT: NORMAL
BUN SERPL-MCNC: 8 MG/DL (ref 8–22)
CALCIUM SERPL-MCNC: 8.4 MG/DL (ref 8.5–10.5)
CHLORIDE SERPL-SCNC: 111 MMOL/L (ref 96–112)
CO2 SERPL-SCNC: 24 MMOL/L (ref 20–33)
CREAT SERPL-MCNC: 0.55 MG/DL (ref 0.5–1.4)
ERYTHROCYTE [DISTWIDTH] IN BLOOD BY AUTOMATED COUNT: 54 FL (ref 35.9–50)
FERRITIN SERPL-MCNC: 411.3 NG/ML (ref 22–322)
GLUCOSE SERPL-MCNC: 93 MG/DL (ref 65–99)
HCT VFR BLD AUTO: 27.9 % (ref 42–52)
HGB BLD-MCNC: 8.3 G/DL (ref 14–18)
MCH RBC QN AUTO: 24.7 PG (ref 27–33)
MCHC RBC AUTO-ENTMCNC: 29.7 G/DL (ref 33.7–35.3)
MCV RBC AUTO: 83 FL (ref 81.4–97.8)
PLATELET # BLD AUTO: 362 K/UL (ref 164–446)
PMV BLD AUTO: 8.2 FL (ref 9–12.9)
POTASSIUM SERPL-SCNC: 3.7 MMOL/L (ref 3.6–5.5)
RBC # BLD AUTO: 3.36 M/UL (ref 4.7–6.1)
SIGNIFICANT IND 70042: NORMAL
SITE SITE: NORMAL
SODIUM SERPL-SCNC: 143 MMOL/L (ref 135–145)
SOURCE SOURCE: NORMAL
VANCOMYCIN TROUGH SERPL-MCNC: 2.9 UG/ML (ref 10–20)
WBC # BLD AUTO: 5.9 K/UL (ref 4.8–10.8)

## 2019-07-26 PROCEDURE — 82728 ASSAY OF FERRITIN: CPT

## 2019-07-26 PROCEDURE — 80048 BASIC METABOLIC PNL TOTAL CA: CPT

## 2019-07-26 PROCEDURE — 36415 COLL VENOUS BLD VENIPUNCTURE: CPT

## 2019-07-26 PROCEDURE — 80202 ASSAY OF VANCOMYCIN: CPT

## 2019-07-26 PROCEDURE — 700117 HCHG RX CONTRAST REV CODE 255: Performed by: FAMILY MEDICINE

## 2019-07-26 PROCEDURE — 97161 PT EVAL LOW COMPLEX 20 MIN: CPT

## 2019-07-26 PROCEDURE — 770021 HCHG ROOM/CARE - ISO PRIVATE

## 2019-07-26 PROCEDURE — 72193 CT PELVIS W/DYE: CPT

## 2019-07-26 PROCEDURE — 99233 SBSQ HOSP IP/OBS HIGH 50: CPT | Performed by: INTERNAL MEDICINE

## 2019-07-26 PROCEDURE — 85027 COMPLETE CBC AUTOMATED: CPT

## 2019-07-26 PROCEDURE — 700102 HCHG RX REV CODE 250 W/ 637 OVERRIDE(OP): Performed by: HOSPITALIST

## 2019-07-26 PROCEDURE — 99233 SBSQ HOSP IP/OBS HIGH 50: CPT | Performed by: FAMILY MEDICINE

## 2019-07-26 PROCEDURE — A9270 NON-COVERED ITEM OR SERVICE: HCPCS | Performed by: FAMILY MEDICINE

## 2019-07-26 PROCEDURE — 700102 HCHG RX REV CODE 250 W/ 637 OVERRIDE(OP): Performed by: FAMILY MEDICINE

## 2019-07-26 PROCEDURE — A9270 NON-COVERED ITEM OR SERVICE: HCPCS | Performed by: HOSPITALIST

## 2019-07-26 RX ORDER — TRAZODONE HYDROCHLORIDE 50 MG/1
50 TABLET ORAL
Status: DISCONTINUED | OUTPATIENT
Start: 2019-07-26 | End: 2019-07-31 | Stop reason: HOSPADM

## 2019-07-26 RX ADMIN — METOPROLOL TARTRATE 25 MG: 25 TABLET, FILM COATED ORAL at 05:11

## 2019-07-26 RX ADMIN — CELECOXIB 200 MG: 200 CAPSULE ORAL at 05:11

## 2019-07-26 RX ADMIN — IOHEXOL 100 ML: 350 INJECTION, SOLUTION INTRAVENOUS at 16:30

## 2019-07-26 RX ADMIN — TRAZODONE HYDROCHLORIDE 50 MG: 50 TABLET ORAL at 22:42

## 2019-07-26 RX ADMIN — BACLOFEN 10 MG: 10 TABLET ORAL at 21:14

## 2019-07-26 RX ADMIN — BACLOFEN 10 MG: 10 TABLET ORAL at 12:32

## 2019-07-26 RX ADMIN — BACLOFEN 10 MG: 10 TABLET ORAL at 08:35

## 2019-07-26 RX ADMIN — FINASTERIDE 5 MG: 5 TABLET, FILM COATED ORAL at 05:10

## 2019-07-26 RX ADMIN — FLECAINIDE ACETATE 25 MG: 50 TABLET ORAL at 18:22

## 2019-07-26 RX ADMIN — MULTIPLE VITAMINS W/ MINERALS TAB 1 TABLET: TAB at 05:11

## 2019-07-26 RX ADMIN — METOPROLOL TARTRATE 25 MG: 25 TABLET, FILM COATED ORAL at 18:22

## 2019-07-26 RX ADMIN — OXYCODONE HYDROCHLORIDE AND ACETAMINOPHEN 1000 MG: 500 TABLET ORAL at 05:10

## 2019-07-26 RX ADMIN — LOSARTAN POTASSIUM 25 MG: 25 TABLET ORAL at 05:12

## 2019-07-26 RX ADMIN — BACLOFEN 10 MG: 10 TABLET ORAL at 18:22

## 2019-07-26 RX ADMIN — FLECAINIDE ACETATE 25 MG: 50 TABLET ORAL at 05:11

## 2019-07-26 ASSESSMENT — ENCOUNTER SYMPTOMS
BLURRED VISION: 0
FOCAL WEAKNESS: 1
MYALGIAS: 0
SORE THROAT: 0
SHORTNESS OF BREATH: 0
HEADACHES: 0
DIZZINESS: 0
TINGLING: 0
DIARRHEA: 0
ABDOMINAL PAIN: 0
SENSORY CHANGE: 0
TREMORS: 0
NAUSEA: 0
CHILLS: 0
VOMITING: 0
WEAKNESS: 0
SENSORY CHANGE: 1
FEVER: 0

## 2019-07-26 NOTE — PROGRESS NOTES
2 RN skin check complete with ZAIRA martinez  Devices in place; hutchins catheter and moon boots.  Skin assessed under devices Yes; pink and blanching.  Confirmed pressure ulcers found on sacral area.  New potential pressure ulcers noted on N/A. Wound consult placed N/A      Noted pink and blanching on facial area. Pink and blanching bilateral ears. Pink and blanching bilateral elbows. Pink and blanching penile area. Scar on left upper legs. Scabs and skin tear noted in left lower extremities. Pale and blanching bilateral heels.       The following interventions in place; on low airloss mattress. Turned to sides every 2 hours. Reinforced dressings. On moon boots.

## 2019-07-26 NOTE — PROGRESS NOTES
AAOx4. Paraplegic. C/o 0/10 pain, declining intervention at this time. -N/V. -N/T. Denies new onset of chest pain/SOB. +BS in all 4 quadrants, last BM 07/24 - requesting bath or shower today. Currently NPO. Wound present to coccyx, wound to see patient today. POC discussed, denies further needs at this time - plan for needle biopsy to be completed today. Call light within reach & hourly rounding in place.

## 2019-07-26 NOTE — PROGRESS NOTES
Pt alert and oriented x4. Offered dinner tray. Consumed 50 to 75% of the meal. Turned to sides every 2 hours. Safety precautions in placed. Bed in lowest position. Treaded socks on. Upper side rails up. Reinforced the use of call light when needing assistance.

## 2019-07-26 NOTE — CARE PLAN
Problem: Infection  Goal: Will remain free from infection  Outcome: PROGRESSING SLOWER THAN EXPECTED  +MRSA in coccyx wound. ID following patient. Awaiting antibiotic plan.    Problem: Skin Integrity  Goal: Risk for impaired skin integrity will decrease  Outcome: PROGRESSING SLOWER THAN EXPECTED  Wound to coccyx. Plan for needle biopsy. Wound care assessed patient today and placed new dressing to coccyx.

## 2019-07-26 NOTE — CARE PLAN
Problem: Safety  Goal: Will remain free from falls  Outcome: PROGRESSING AS EXPECTED  Reinforced the use of call light when needing assistance. Treaded socks on. Bed on lowest position. Personal belongings within reach. Clutter free environment provided.     Problem: Skin Integrity  Goal: Risk for impaired skin integrity will decrease  Outcome: PROGRESSING AS EXPECTED  Turned to sides every 2 hours.

## 2019-07-26 NOTE — WOUND TEAM
[facility]  Inpatient Services  Initial Wound and Skin Care Evaluation    Admission Date:  7/24/2019   HPI, PMH, SH: Reviewed  Unit where seen by Wound Team: [id number]    WOUND CONSULT RELATED TO:  Sacral wound and left calf     SUBJECTIVE:  Pt agreeable      Self Report / Pain Level:  0/10     OBJECTIVE:  Pt in bed, A of 1 to roll, daughter bedside, gauze and mepilex on sacrum, adhesive foam on calf    WOUND TYPE, LOCATION, CHARACTERISTICS (Pressure ulcers: location, stage, POA or date identified)     Pressure Injury 07/24/19 sacrum stage 4 POA (Active)   Pressure Injury Stage 4 7/26/2019  1:00 PM   State of Healing Non-healing 7/26/2019  1:00 PM   Site Assessment Red;White 7/26/2019  1:00 PM   Raeann-wound Assessment Pink 7/26/2019  1:00 PM   Margins Defined edges 7/26/2019  1:00 PM   Wound Length (cm) 8 cm 7/26/2019  1:00 PM   Wound Width (cm) 7 cm 7/26/2019  1:00 PM   Wound Depth (cm) 5 cm 7/26/2019  1:00 PM   Wound Surface Area (cm^2) 56 cm^2 7/26/2019  1:00 PM   Undermining 7 cm 7/26/2019  1:00 PM   Closure Secondary intention 7/26/2019  1:00 PM   Drainage Amount Small 7/26/2019  1:00 PM   Drainage Description Serosanguineous 7/26/2019  1:00 PM   Treatments Site care;Cleansed;Irrigation 7/26/2019  1:00 PM   Cleansing Approved Wound Cleanser 7/26/2019  1:00 PM   Periwound Protectant Skin Protectant wipes to Periwound 7/26/2019  1:00 PM   Dressing Options Roll Gauze;Mepilex 7/26/2019  1:00 PM   Dressing Cleansing/Solutions Normal Saline 7/26/2019  1:00 PM   Dressing Changed Changed 7/26/2019  1:00 PM   Dressing Change Frequency Daily 7/26/2019  1:00 PM   NEXT Dressing Change  07/20/19 7/26/2019  1:00 PM   NEXT Weekly Photo (Inpatient Only) 08/02/19 7/26/2019  1:00 PM   WOUND NURSE ONLY - Odor Mild 7/26/2019  1:00 PM   WOUND NURSE ONLY - Exposed Structures Bone 7/26/2019  1:00 PM   WOUND NURSE ONLY - Tissue Type and Percentage 90 red, 10 white 7/26/2019  1:00 PM   WOUND NURSE ONLY - Time Spent with  Patient (mins) 60 7/26/2019  1:00 PM       Pressure Injury 07/24/19 left calf stage 3 POA (Active)   Wound Image   7/26/2019  1:00 PM   Pressure Injury Stage 3 7/26/2019  1:00 PM   State of Healing Non-healing 7/26/2019  1:00 PM   Site Assessment Red 7/26/2019  1:00 PM   Raeann-wound Assessment Pink;Dark edges 7/26/2019  1:00 PM   Margins Defined edges 7/26/2019  1:00 PM   Wound Length (cm) 1.5 cm 7/26/2019  1:00 PM   Wound Width (cm) 1 cm 7/26/2019  1:00 PM   Wound Surface Area (cm^2) 1.5 cm^2 7/26/2019  1:00 PM   Tunneling 0 cm 7/26/2019  1:00 PM   Undermining 0 cm 7/26/2019  1:00 PM   Closure Secondary intention 7/26/2019  1:00 PM   Drainage Amount Small 7/26/2019  1:00 PM   Drainage Description Serosanguineous 7/26/2019  1:00 PM   Treatments Site care;Cleansed;Irrigation 7/26/2019  1:00 PM   Cleansing Approved Wound Cleanser 7/26/2019  1:00 PM   Periwound Protectant Skin Protectant wipes to Periwound 7/26/2019  1:00 PM   Dressing Options Honey Colloid;Adhesive Foam 7/26/2019  1:00 PM   Dressing Changed Changed 7/26/2019  1:00 PM   Dressing Change Frequency Every 72 hrs 7/26/2019  1:00 PM   NEXT Dressing Change  07/29/19 7/26/2019  1:00 PM   NEXT Weekly Photo (Inpatient Only) 08/02/19 7/26/2019  1:00 PM   WOUND NURSE ONLY - Odor None 7/26/2019  1:00 PM   WOUND NURSE ONLY - Exposed Structures None 7/26/2019  1:00 PM   WOUND NURSE ONLY - Tissue Type and Percentage 100% red 7/26/2019  1:00 PM        Vascular: nt      Lab Values:    WBC:       WBC   Date/Time Value Ref Range Status   07/26/2019 02:11 AM 5.9 4.8 - 10.8 K/uL Final     AIC:    No results found for: HBA1C      Culture:   Completed nt    INTERVENTIONS BY WOUND TEAM:  Dressings removed, wounds cleansed with saline, pictures and measurements taken, sacral wound packed with rolled gauze soaked with saline and covered with mepilex.  Calf wound covered with honey colloid and covered with adhesive foam.        Interdisciplinary consultation:  RN,  patient    EVALUATION:  Pt awaiting further diagnostic work up of sacral wound.  Will place VAC after planned bone biopsy.  Calf wound should heal without complication      Factors affecting wound healing:  Immobility, bone exposed in wound bed  Goals:  Steady decrease in wound area and depth weekly     NURSING PLAN OF CARE ORDERS (X):    Dressing changes: See Dressing Care orders:     Skin care: See Skin Care orders:   Rectal tube care: See Rectal Tube Care orders:   Other orders:    RSKIN: CURRENT (X) ORDERED (O)  Q shift Luis Enrique:  X  Q shift pressure point assessments:  X  Pressure redistribution mattress        Waffle overlay  JERARDO    X  Bariatric JERARDO      Bariatric foam        Heel float boots     X  Heels floated on pillows      Barrier wipes      Barrier Cream      Barrier paste      Sacral silicone dressing    X  Silicone O2 tubing      Anchorfast      Trach with Optifoam split foam       Waffle cushion      Rectal tube or BMS      Antifungal tx    Turn q 2 hours   X  Up to chair     Ambulate   PT/OT     Dietician      PO     TF   TPN   NPO   # days   Other       WOUND TEAM PLAN OF CARE (X):   NPWT change 3 x week:      X VAC planned to be placed 7/27/19 for sacrum,   Dressing changes by wound team:       Follow up as needed:   X for left calf wound  Other (explain):    Anticipated discharge plans (X):  SNF:        X pt will need VAC vera neftali wound changes for sacral wound upon discharge   Home Care:           Outpatient Wound Center:            Self Care:            Other:
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
## 2019-07-26 NOTE — PROGRESS NOTES
Infectious Disease Progress Note    Author: Rosmery Crabtree M.D. Date & Time of service: 2019  3:57 PM    Chief Complaint:  Sacral decubitus  Sacral osteomyelitis      Interval History:  69 y.o. WM with  C5- 7 complete quadriplegia admitted 2019 from [facility] on 2019 for wound check on his coccyx. Large decub to bone   AF WBC 5.9  seen with wound care-no surrounding cellulitis. Planned for CT and biopsy today    Labs Reviewed, Medications Reviewed, Radiology Reviewed and Wound Reviewed.    Review of Systems:  Review of Systems   Constitutional: Negative for chills and fever.   Neurological: Positive for sensory change and focal weakness.   All other systems reviewed and are negative.      Hemodynamics:  Temp (24hrs), Av.8 °C (98.2 °F), Min:36.5 °C (97.7 °F), Max:37 °C (98.6 °F)  Temperature: 36.5 °C (97.7 °F)  Pulse  Av.8  Min: 73  Max: 80   Blood Pressure : 146/89       Physical Exam:  Physical Exam   Constitutional: He is oriented to person, place, and time. No distress.   HENT:   Mouth/Throat: No oropharyngeal exudate.   Eyes: Pupils are equal, round, and reactive to light. EOM are normal. No scleral icterus.   Neck: No JVD present.   Cardiovascular: Normal rate.    Pulmonary/Chest: Effort normal. No stridor. No respiratory distress.   Abdominal: Soft. He exhibits no distension.   Musculoskeletal: He exhibits edema.   Muscle wasting   Neurological: He is alert and oriented to person, place, and time. He displays abnormal reflex. He exhibits abnormal muscle tone. Coordination abnormal.   Skin: No rash noted. He is not diaphoretic.   Deep sacral ulceration 8 cm X 7 cm X 5 cm with visible bone   Nursing note and vitals reviewed.      Meds:    Current Facility-Administered Medications:   •  MD Alert...Total Body Iron Replacement per Pharmacy  •  traZODone  •  ascorbic acid  •  baclofen  •  celecoxib  •  finasteride  •  flecainide  •  losartan  •  metoprolol  •   polyethylene glycol/lytes  •  therapeutic multivitamin-minerals  •  senna-docusate **AND** [DISCONTINUED] polyethylene glycol/lytes **AND** magnesium hydroxide **AND** bisacodyl  •  acetaminophen  •  Notify provider if pain remains uncontrolled **AND** Use the numeric rating scale (NRS-11) on regular floors and Critical-Care Pain Observation Tool (CPOT) on ICUs/Trauma to assess pain **AND** Pulse Ox (Oximetry) **AND** Pharmacy Consult Request **AND** If patient difficult to arouse and/or has respiratory depression, stop any opiates that are currently infusing and call a Rapid Response. **AND** oxyCODONE immediate-release **AND** oxyCODONE immediate-release **AND** HYDROmorphone  •  ondansetron  •  ondansetron  •  lactobacillus rhamnosus    Labs:  Recent Labs      07/24/19   1120 07/25/19 0158 07/26/19 0211   WBC  7.4  6.7  5.9   RBC  3.35*  3.08*  3.36*   HEMOGLOBIN  8.2*  7.5*  8.3*   HEMATOCRIT  28.2*  26.0*  27.9*   MCV  84.2  84.4  83.0   MCH  24.5*  24.4*  24.7*   RDW  54.4*  54.6*  54.0*   PLATELETCT  387  348  362   MPV  8.5*  8.3*  8.2*   NEUTSPOLYS  73.00*  68.50   --    LYMPHOCYTES  14.80*  19.10*   --    MONOCYTES  10.30  9.00   --    EOSINOPHILS  0.90  2.20   --    BASOPHILS  0.30  0.30   --    RBCMORPHOLO  Present   --    --      Recent Labs      07/24/19   1120 07/25/19   0158  07/26/19 0211   SODIUM  137  142  143   POTASSIUM  3.7  3.8  3.7   CHLORIDE  104  109  111   CO2  26  25  24   GLUCOSE  107*  96  93   BUN  10  9  8     Recent Labs      07/24/19   1120  07/25/19   0158  07/26/19 0211   ALBUMIN  2.9*   --    --    TBILIRUBIN  0.4   --    --    ALKPHOSPHAT  71   --    --    TOTPROTEIN  6.6   --    --    ALTSGPT  16   --    --    ASTSGOT  10*   --    --    CREATININE  0.51  0.66  0.55       Imaging:  Dx-chest-portable (1 View)    Result Date: 7/24/2019 7/24/2019 11:57 AM HISTORY/REASON FOR EXAM:  Sepsis protocol. Atrial fibrillation. Hypertension. TECHNIQUE/EXAM DESCRIPTION AND NUMBER  OF VIEWS: Single portable view of the chest. COMPARISON: None available. FINDINGS: The mediastinal and cardiac silhouette is enlarged. The pulmonary vascularity is within normal limits. The lung parenchyma is clear. There is no significant pleural effusion. There is no visible pneumothorax. There are postsurgical changes in the cervical thoracic spine. There are right lateral rib fractures, probably old.     1.  There is no acute cardiopulmonary process. 2.  There is an enlarged cardiac silhouette.      Micro:  Results     Procedure Component Value Units Date/Time    CULTURE WOUND W/ GRAM STAIN [[id number]]  (Abnormal) Collected:  07/24/19 1315    Order Status:  Completed Specimen:  Wound from Buttock Updated:  07/26/19 1400     Significant Indicator POS (POS)     Source WND     Site COCCYX     Culture Result Rare growth mixed skin ade. (A)     Gram Stain Result Many WBCs.  Many Gram positive cocci.  Moderate Gram negative rods.  Few Gram positive rods.       Culture Result Streptococcus constellatus/milleri  Light growth   (A)      Staphylococcus aureus  Rare growth  Methicillin resistant per screening method   (A)      Bacteroides fragilis  Moderate growth   (A)    Narrative:       CALL  [facility]  61 tel. [phone],  CALLED  61 tel. [phone] 07/26/2019, 08:26, RB PERF. RESULTS CALLED TO:[id number]  Swab received    URINE CULTURE(NEW) [[id number]] Collected:  07/24/19 1158    Order Status:  Completed Specimen:  Urine Updated:  07/26/19 0819     Significant Indicator NEG     Source UR     Site -     Culture Result No growth at 48 hours.    Narrative:       Indication for culture:->Emergency Room Patient  Indication for culture:->Emergency Room Patient    BLOOD CULTURE [[id number]] Collected:  07/24/19 1201    Order Status:  Completed Specimen:  Blood from Peripheral Updated:  07/25/19 0919     Significant Indicator NEG     Source BLD     Site PERIPHERAL     Culture Result No Growth  Note: Blood cultures are incubated for 5  "days and  are monitored continuously.Positive blood cultures  are called to the RN and reported as soon as  they are identified.      Narrative:       Per Hospital Policy: Only change Specimen Src: to \"Line\" if  specified by physician order.  No site indicated    BLOOD CULTURE [[id number]] Collected:  07/24/19 1215    Order Status:  Completed Specimen:  Blood from Peripheral Updated:  07/25/19 0919     Significant Indicator NEG     Source BLD     Site PERIPHERAL     Culture Result No Growth  Note: Blood cultures are incubated for 5 days and  are monitored continuously.Positive blood cultures  are called to the RN and reported as soon as  they are identified.      Narrative:       Per Hospital Policy: Only change Specimen Src: to \"Line\" if  specified by physician order.  No site indicated    GRAM STAIN [[id number]] Collected:  07/24/19 1315    Order Status:  Completed Specimen:  Wound Updated:  07/24/19 1635     Significant Indicator .     Source WND     Site COCCYX     Gram Stain Result Many WBCs.  Many Gram positive cocci.  Moderate Gram negative rods.  Few Gram positive rods.      Narrative:       Swab received    URINALYSIS [[id number]] Collected:  07/24/19 1158    Order Status:  Completed Specimen:  Urine Updated:  07/24/19 1216     Color Yellow     Character Clear     Specific Gravity 1.015     Ph 6.5     Glucose Negative mg/dL      Ketones Negative mg/dL      Protein Negative mg/dL      Bilirubin Negative     Urobilinogen, Urine 0.2     Nitrite Negative     Leukocyte Esterase Negative     Occult Blood Negative     Micro Urine Req see below     Comment: Microscopic examination not performed when specimen is clear  and chemically negative for protein, blood, leukocyte esterase  and nitrite.         Narrative:       Indication for culture:->Emergency Room Patient          Assessment:  Active Hospital Problems    Diagnosis   • *Osteomyelitis of coccyx (HCC) [M86.9]   • Anemia [D64.9]   • Pressure injury of sacral " region, stage 4 (HCC) [L89.154]   • Paraplegia (HCC) [G82.20]   • Neurogenic bladder [N31.9]       Plan:  Sacral decubitis, Stage IV  Sacral OM  Afebrile  No leukocytosis  Wound culture polymicrobial (MRSA, Bfrag, strep sofar)  Blood cxs neg   Plan for bone biopsy to guide abx therapy  Refused MRI-OSH XRAY + OM by report  May need diverting colostomy  Hold abx until after biopsy-then start vancomycin and Zosyn  Wound care    Cervical quad  Impediment to offloading and healing    Discussed with internal medicine.
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
## 2019-07-27 ENCOUNTER — ANESTHESIA (OUTPATIENT)
Dept: SURGERY | Facility: MEDICAL CENTER | Age: 69
DRG: 981 | End: 2019-07-27
Payer: MEDICARE

## 2019-07-27 ENCOUNTER — ANESTHESIA EVENT (OUTPATIENT)
Dept: SURGERY | Facility: MEDICAL CENTER | Age: 69
DRG: 981 | End: 2019-07-27
Payer: MEDICARE

## 2019-07-27 ENCOUNTER — APPOINTMENT (OUTPATIENT)
Dept: RADIOLOGY | Facility: MEDICAL CENTER | Age: 69
DRG: 981 | End: 2019-07-27
Attending: HOSPITALIST
Payer: MEDICARE

## 2019-07-27 PROBLEM — Z93.3 S/P COLOSTOMY (HCC): Status: ACTIVE | Noted: 2019-07-27

## 2019-07-27 LAB
BACTERIA WND AEROBE CULT: ABNORMAL
EKG IMPRESSION: NORMAL
ERYTHROCYTE [DISTWIDTH] IN BLOOD BY AUTOMATED COUNT: 53 FL (ref 35.9–50)
GRAM STN SPEC: ABNORMAL
HCT VFR BLD AUTO: 27.5 % (ref 42–52)
HGB BLD-MCNC: 8.3 G/DL (ref 14–18)
MCH RBC QN AUTO: 25.1 PG (ref 27–33)
MCHC RBC AUTO-ENTMCNC: 30.2 G/DL (ref 33.7–35.3)
MCV RBC AUTO: 83.1 FL (ref 81.4–97.8)
PLATELET # BLD AUTO: 342 K/UL (ref 164–446)
PMV BLD AUTO: 8.2 FL (ref 9–12.9)
RBC # BLD AUTO: 3.31 M/UL (ref 4.7–6.1)
SIGNIFICANT IND 70042: ABNORMAL
SITE SITE: ABNORMAL
SOURCE SOURCE: ABNORMAL
WBC # BLD AUTO: 5.9 K/UL (ref 4.8–10.8)

## 2019-07-27 PROCEDURE — 36415 COLL VENOUS BLD VENIPUNCTURE: CPT

## 2019-07-27 PROCEDURE — 700102 HCHG RX REV CODE 250 W/ 637 OVERRIDE(OP): Performed by: FAMILY MEDICINE

## 2019-07-27 PROCEDURE — 700101 HCHG RX REV CODE 250: Performed by: ANESTHESIOLOGY

## 2019-07-27 PROCEDURE — A9270 NON-COVERED ITEM OR SERVICE: HCPCS | Performed by: FAMILY MEDICINE

## 2019-07-27 PROCEDURE — 85027 COMPLETE CBC AUTOMATED: CPT

## 2019-07-27 PROCEDURE — 160035 HCHG PACU - 1ST 60 MINS PHASE I: Performed by: SURGERY

## 2019-07-27 PROCEDURE — 160041 HCHG SURGERY MINUTES - EA ADDL 1 MIN LEVEL 4: Performed by: SURGERY

## 2019-07-27 PROCEDURE — 99233 SBSQ HOSP IP/OBS HIGH 50: CPT | Performed by: INTERNAL MEDICINE

## 2019-07-27 PROCEDURE — 501452 HCHG STAPLES, GIA MULTIFIRE 60/80: Performed by: SURGERY

## 2019-07-27 PROCEDURE — 500002 HCHG ADHESIVE, DERMABOND: Performed by: SURGERY

## 2019-07-27 PROCEDURE — A6403 STERILE GAUZE>16 <= 48 SQ IN: HCPCS | Performed by: SURGERY

## 2019-07-27 PROCEDURE — 700111 HCHG RX REV CODE 636 W/ 250 OVERRIDE (IP): Performed by: ANESTHESIOLOGY

## 2019-07-27 PROCEDURE — A9270 NON-COVERED ITEM OR SERVICE: HCPCS | Performed by: NURSE PRACTITIONER

## 2019-07-27 PROCEDURE — 93005 ELECTROCARDIOGRAM TRACING: CPT | Performed by: ANESTHESIOLOGY

## 2019-07-27 PROCEDURE — 501433 HCHG STAPLER, GIA MULTIFIRE 60/80: Performed by: SURGERY

## 2019-07-27 PROCEDURE — 160002 HCHG RECOVERY MINUTES (STAT): Performed by: SURGERY

## 2019-07-27 PROCEDURE — 93010 ELECTROCARDIOGRAM REPORT: CPT | Performed by: INTERNAL MEDICINE

## 2019-07-27 PROCEDURE — 700102 HCHG RX REV CODE 250 W/ 637 OVERRIDE(OP): Performed by: HOSPITALIST

## 2019-07-27 PROCEDURE — 160029 HCHG SURGERY MINUTES - 1ST 30 MINS LEVEL 4: Performed by: SURGERY

## 2019-07-27 PROCEDURE — 160048 HCHG OR STATISTICAL LEVEL 1-5: Performed by: SURGERY

## 2019-07-27 PROCEDURE — 97606 NEG PRS WND THER DME>50 SQCM: CPT

## 2019-07-27 PROCEDURE — 160009 HCHG ANES TIME/MIN: Performed by: SURGERY

## 2019-07-27 PROCEDURE — 500800 HCHG LAPAROSCOPIC J/L HOOK: Performed by: SURGERY

## 2019-07-27 PROCEDURE — 501838 HCHG SUTURE GENERAL: Performed by: SURGERY

## 2019-07-27 PROCEDURE — 501338 HCHG SHEARS, ENDO: Performed by: SURGERY

## 2019-07-27 PROCEDURE — A9270 NON-COVERED ITEM OR SERVICE: HCPCS | Performed by: HOSPITALIST

## 2019-07-27 PROCEDURE — 700105 HCHG RX REV CODE 258: Performed by: INTERNAL MEDICINE

## 2019-07-27 PROCEDURE — 160036 HCHG PACU - EA ADDL 30 MINS PHASE I: Performed by: SURGERY

## 2019-07-27 PROCEDURE — 700105 HCHG RX REV CODE 258

## 2019-07-27 PROCEDURE — 700101 HCHG RX REV CODE 250: Performed by: SURGERY

## 2019-07-27 PROCEDURE — 770001 HCHG ROOM/CARE - MED/SURG/GYN PRIV*

## 2019-07-27 PROCEDURE — 99233 SBSQ HOSP IP/OBS HIGH 50: CPT | Performed by: FAMILY MEDICINE

## 2019-07-27 PROCEDURE — 501445 HCHG STAPLER, SKIN DISP: Performed by: SURGERY

## 2019-07-27 PROCEDURE — 700102 HCHG RX REV CODE 250 W/ 637 OVERRIDE(OP): Performed by: NURSE PRACTITIONER

## 2019-07-27 PROCEDURE — 700105 HCHG RX REV CODE 258: Performed by: ANESTHESIOLOGY

## 2019-07-27 PROCEDURE — 700111 HCHG RX REV CODE 636 W/ 250 OVERRIDE (IP): Performed by: INTERNAL MEDICINE

## 2019-07-27 RX ORDER — PHENYLEPHRINE HYDROCHLORIDE 10 MG/ML
INJECTION, SOLUTION INTRAMUSCULAR; INTRAVENOUS; SUBCUTANEOUS PRN
Status: DISCONTINUED | OUTPATIENT
Start: 2019-07-27 | End: 2019-07-27 | Stop reason: SURG

## 2019-07-27 RX ORDER — SODIUM CHLORIDE 9 MG/ML
INJECTION, SOLUTION INTRAVENOUS
Status: COMPLETED
Start: 2019-07-27 | End: 2019-07-27

## 2019-07-27 RX ORDER — ONDANSETRON 2 MG/ML
4 INJECTION INTRAMUSCULAR; INTRAVENOUS
Status: DISCONTINUED | OUTPATIENT
Start: 2019-07-27 | End: 2019-07-27 | Stop reason: HOSPADM

## 2019-07-27 RX ORDER — FERROUS SULFATE 325(65) MG
325 TABLET ORAL 2 TIMES DAILY WITH MEALS
Status: DISCONTINUED | OUTPATIENT
Start: 2019-07-27 | End: 2019-07-31 | Stop reason: HOSPADM

## 2019-07-27 RX ORDER — LABETALOL HYDROCHLORIDE 5 MG/ML
5 INJECTION, SOLUTION INTRAVENOUS
Status: DISCONTINUED | OUTPATIENT
Start: 2019-07-27 | End: 2019-07-27 | Stop reason: HOSPADM

## 2019-07-27 RX ORDER — BUPIVACAINE HYDROCHLORIDE AND EPINEPHRINE 5; 5 MG/ML; UG/ML
INJECTION, SOLUTION EPIDURAL; INTRACAUDAL; PERINEURAL
Status: DISCONTINUED | OUTPATIENT
Start: 2019-07-27 | End: 2019-07-27 | Stop reason: HOSPADM

## 2019-07-27 RX ORDER — HYDROMORPHONE HYDROCHLORIDE 1 MG/ML
0.2 INJECTION, SOLUTION INTRAMUSCULAR; INTRAVENOUS; SUBCUTANEOUS
Status: DISCONTINUED | OUTPATIENT
Start: 2019-07-27 | End: 2019-07-27 | Stop reason: HOSPADM

## 2019-07-27 RX ORDER — HYDROMORPHONE HYDROCHLORIDE 1 MG/ML
0.1 INJECTION, SOLUTION INTRAMUSCULAR; INTRAVENOUS; SUBCUTANEOUS
Status: DISCONTINUED | OUTPATIENT
Start: 2019-07-27 | End: 2019-07-27 | Stop reason: HOSPADM

## 2019-07-27 RX ORDER — OXYCODONE HYDROCHLORIDE AND ACETAMINOPHEN 5; 325 MG/1; MG/1
1 TABLET ORAL
Status: DISCONTINUED | OUTPATIENT
Start: 2019-07-27 | End: 2019-07-27 | Stop reason: HOSPADM

## 2019-07-27 RX ORDER — DIPHENHYDRAMINE HYDROCHLORIDE 50 MG/ML
12.5 INJECTION INTRAMUSCULAR; INTRAVENOUS
Status: DISCONTINUED | OUTPATIENT
Start: 2019-07-27 | End: 2019-07-27 | Stop reason: HOSPADM

## 2019-07-27 RX ORDER — HYDROMORPHONE HYDROCHLORIDE 1 MG/ML
0.4 INJECTION, SOLUTION INTRAMUSCULAR; INTRAVENOUS; SUBCUTANEOUS
Status: DISCONTINUED | OUTPATIENT
Start: 2019-07-27 | End: 2019-07-27 | Stop reason: HOSPADM

## 2019-07-27 RX ORDER — MEPERIDINE HYDROCHLORIDE 25 MG/ML
6.25 INJECTION INTRAMUSCULAR; INTRAVENOUS; SUBCUTANEOUS
Status: DISCONTINUED | OUTPATIENT
Start: 2019-07-27 | End: 2019-07-27 | Stop reason: HOSPADM

## 2019-07-27 RX ORDER — OXYCODONE HYDROCHLORIDE AND ACETAMINOPHEN 5; 325 MG/1; MG/1
2 TABLET ORAL
Status: DISCONTINUED | OUTPATIENT
Start: 2019-07-27 | End: 2019-07-27 | Stop reason: HOSPADM

## 2019-07-27 RX ORDER — SODIUM CHLORIDE, SODIUM LACTATE, POTASSIUM CHLORIDE, CALCIUM CHLORIDE 600; 310; 30; 20 MG/100ML; MG/100ML; MG/100ML; MG/100ML
INJECTION, SOLUTION INTRAVENOUS CONTINUOUS
Status: DISCONTINUED | OUTPATIENT
Start: 2019-07-27 | End: 2019-07-27 | Stop reason: HOSPADM

## 2019-07-27 RX ORDER — SODIUM CHLORIDE, SODIUM LACTATE, POTASSIUM CHLORIDE, CALCIUM CHLORIDE 600; 310; 30; 20 MG/100ML; MG/100ML; MG/100ML; MG/100ML
INJECTION, SOLUTION INTRAVENOUS
Status: DISCONTINUED | OUTPATIENT
Start: 2019-07-27 | End: 2019-07-27 | Stop reason: SURG

## 2019-07-27 RX ORDER — ONDANSETRON 2 MG/ML
INJECTION INTRAMUSCULAR; INTRAVENOUS PRN
Status: DISCONTINUED | OUTPATIENT
Start: 2019-07-27 | End: 2019-07-27 | Stop reason: SURG

## 2019-07-27 RX ORDER — HALOPERIDOL 5 MG/ML
1 INJECTION INTRAMUSCULAR
Status: DISCONTINUED | OUTPATIENT
Start: 2019-07-27 | End: 2019-07-27 | Stop reason: HOSPADM

## 2019-07-27 RX ORDER — CEFOTETAN DISODIUM 2 G/20ML
INJECTION, POWDER, FOR SOLUTION INTRAMUSCULAR; INTRAVENOUS PRN
Status: DISCONTINUED | OUTPATIENT
Start: 2019-07-27 | End: 2019-07-27 | Stop reason: SURG

## 2019-07-27 RX ADMIN — PROPOFOL 150 MG: 10 INJECTION, EMULSION INTRAVENOUS at 07:17

## 2019-07-27 RX ADMIN — TRAZODONE HYDROCHLORIDE 50 MG: 50 TABLET ORAL at 21:55

## 2019-07-27 RX ADMIN — PHENYLEPHRINE HYDROCHLORIDE 100 MCG: 10 INJECTION INTRAVENOUS at 07:20

## 2019-07-27 RX ADMIN — SODIUM CHLORIDE, POTASSIUM CHLORIDE, SODIUM LACTATE AND CALCIUM CHLORIDE: 600; 310; 30; 20 INJECTION, SOLUTION INTRAVENOUS at 08:13

## 2019-07-27 RX ADMIN — FLECAINIDE ACETATE 25 MG: 50 TABLET ORAL at 18:06

## 2019-07-27 RX ADMIN — FENTANYL CITRATE 100 MCG: 50 INJECTION, SOLUTION INTRAMUSCULAR; INTRAVENOUS at 07:17

## 2019-07-27 RX ADMIN — FENTANYL CITRATE 50 MCG: 50 INJECTION, SOLUTION INTRAMUSCULAR; INTRAVENOUS at 07:40

## 2019-07-27 RX ADMIN — SODIUM CHLORIDE, POTASSIUM CHLORIDE, SODIUM LACTATE AND CALCIUM CHLORIDE: 600; 310; 30; 20 INJECTION, SOLUTION INTRAVENOUS at 06:45

## 2019-07-27 RX ADMIN — CEFOTETAN DISODIUM 2 G: 2 INJECTION, POWDER, FOR SOLUTION INTRAMUSCULAR; INTRAVENOUS at 07:25

## 2019-07-27 RX ADMIN — BACLOFEN 10 MG: 10 TABLET ORAL at 20:48

## 2019-07-27 RX ADMIN — PHENYLEPHRINE HYDROCHLORIDE 100 MCG: 10 INJECTION INTRAVENOUS at 07:30

## 2019-07-27 RX ADMIN — ROCURONIUM BROMIDE 50 MG: 10 INJECTION, SOLUTION INTRAVENOUS at 07:17

## 2019-07-27 RX ADMIN — PIPERACILLIN AND TAZOBACTAM 3.38 G: 3; .375 INJECTION, POWDER, LYOPHILIZED, FOR SOLUTION INTRAVENOUS; PARENTERAL at 16:28

## 2019-07-27 RX ADMIN — FINASTERIDE 5 MG: 5 TABLET, FILM COATED ORAL at 05:55

## 2019-07-27 RX ADMIN — PIPERACILLIN AND TAZOBACTAM 3.38 G: 3; .375 INJECTION, POWDER, LYOPHILIZED, FOR SOLUTION INTRAVENOUS; PARENTERAL at 21:50

## 2019-07-27 RX ADMIN — BACLOFEN 10 MG: 10 TABLET ORAL at 13:35

## 2019-07-27 RX ADMIN — MULTIPLE VITAMINS W/ MINERALS TAB 1 TABLET: TAB at 05:55

## 2019-07-27 RX ADMIN — CELECOXIB 200 MG: 200 CAPSULE ORAL at 05:55

## 2019-07-27 RX ADMIN — OXYCODONE HYDROCHLORIDE AND ACETAMINOPHEN 1000 MG: 500 TABLET ORAL at 05:55

## 2019-07-27 RX ADMIN — SODIUM CHLORIDE 500 ML: 9 INJECTION, SOLUTION INTRAVENOUS at 16:30

## 2019-07-27 RX ADMIN — FLECAINIDE ACETATE 25 MG: 50 TABLET ORAL at 05:56

## 2019-07-27 RX ADMIN — METOPROLOL TARTRATE 25 MG: 25 TABLET, FILM COATED ORAL at 16:31

## 2019-07-27 RX ADMIN — VANCOMYCIN HYDROCHLORIDE 2200 MG: 500 INJECTION, POWDER, LYOPHILIZED, FOR SOLUTION INTRAVENOUS at 18:02

## 2019-07-27 RX ADMIN — FERROUS SULFATE TAB 325 MG (65 MG ELEMENTAL FE) 325 MG: 325 (65 FE) TAB at 16:31

## 2019-07-27 RX ADMIN — PHENYLEPHRINE HYDROCHLORIDE 200 MCG: 10 INJECTION INTRAVENOUS at 07:25

## 2019-07-27 RX ADMIN — SUGAMMADEX 200 MG: 100 INJECTION, SOLUTION INTRAVENOUS at 08:10

## 2019-07-27 RX ADMIN — BACLOFEN 10 MG: 10 TABLET ORAL at 16:30

## 2019-07-27 RX ADMIN — ONDANSETRON 4 MG: 2 INJECTION INTRAMUSCULAR; INTRAVENOUS at 08:10

## 2019-07-27 RX ADMIN — FENTANYL CITRATE 50 MCG: 50 INJECTION, SOLUTION INTRAMUSCULAR; INTRAVENOUS at 08:10

## 2019-07-27 RX ADMIN — PHENYLEPHRINE HYDROCHLORIDE 200 MCG: 10 INJECTION INTRAVENOUS at 07:59

## 2019-07-27 ASSESSMENT — PATIENT HEALTH QUESTIONNAIRE - PHQ9
2. FEELING DOWN, DEPRESSED, IRRITABLE, OR HOPELESS: NOT AT ALL
SUM OF ALL RESPONSES TO PHQ9 QUESTIONS 1 AND 2: 0
1. LITTLE INTEREST OR PLEASURE IN DOING THINGS: NOT AT ALL

## 2019-07-27 ASSESSMENT — ENCOUNTER SYMPTOMS
TINGLING: 0
FOCAL WEAKNESS: 1
DIZZINESS: 0
SENSORY CHANGE: 1
COUGH: 0
TREMORS: 0
MYALGIAS: 0
VOMITING: 0
ABDOMINAL PAIN: 0
NAUSEA: 0
HEADACHES: 0
WEAKNESS: 0
CONSTIPATION: 0
DOUBLE VISION: 0
BLURRED VISION: 0
PALPITATIONS: 0
SPEECH CHANGE: 0
CHILLS: 0
SHORTNESS OF BREATH: 0
DIARRHEA: 0
FEVER: 0
SENSORY CHANGE: 0

## 2019-07-27 ASSESSMENT — PAIN SCALES - GENERAL: PAIN_LEVEL: 0

## 2019-07-27 NOTE — OP REPORT
DATE OF SERVICE:  07/27/2019    PREOPERATIVE DIAGNOSES:  Decubitus ulcer, incontinence, paraplegia.    POSTOPERATIVE DIAGNOSES:  Decubitus ulcer, incontinence, paraplegia.    PROCEDURE:  Laparoscopic colostomy placement.    SURGEON:  Elías Hannah MD    ASSISTANT:  ADILSON Del Cid    ANESTHESIA:  General endotracheal anesthesia.    ESTIMATED BLOOD LOSS:  10 mL.    SPECIMENS:  None.    COMPLICATIONS:  None.    CONDITION:  Stable.    INDICATIONS FOR PROCEDURE:  This is a 69-year-old male who presents with a   decubitus ulcer as well as incontinence of paraplegia.  He is going to need a   colostomy placement for further wound care and treatment options.  Risks,   benefits and alternatives of surgery were explained to him and he was taken to   the OR for placement of a colostomy at a premarked site.    OPERATIVE FINDINGS:  Inflammation of the left lower quadrant from previous   ostomy in the patient's past.  Adhesions removed, large bowel   deserosalizations repaired with hemostasis.  Loop colostomy placed in the left   lower quadrant with hemostasis.    OPERATIVE TECHNIQUE:  After informed consent was obtained, the patient was   taken to the operating room and placed in the supine position.  After adequate   endotracheal anesthesia was achieved, the patient was taken to the operating   room and placed in the supine position.  After adequate endotracheal   anesthesia was achieved, the abdomen was prepped and draped in a sterile   fashion.  The operation was begun by placing a 5 mm incision in the right   lower quadrant and a 5 mm incision in the right upper quadrant.  Finally, a 5   mm incision was placed in the left lower quadrant at a premarked ostomy site.    Pneumoperitoneum was achieved and patient positioned.  We then began by using   cautery to take down the adhesions in the left lower quadrant with a cautery   and sharp dissection.  With this completed, we were able to mobilize some of   the lateral  attachments of the descending and sigmoid colon such that it   reached the abdominal wall without tension.  We then enlarged one of the   trocar sites where it was premarked for an ostomy and removed a core of fatty   tissue down to the abdominal wall.  This was incised in a cruciate fashion and   the loop of bowel reduced down on to the operative field.    We repaired some large bowel deserosalizations from the dissection using   interrupted 3-0 Vicryl Lembert sutures.  We then irrigated the wound and   brought out a healthy loop of bowel for maturation.  This was opened with   cautery and matured to the loop colostomy using 3-0 Vicryl sutures with the   keyon budding technique.    The clean wounds were closed separately with clean instruments using 4-0   Monocryl and Dermabond.  The ostomy appliance was placed and the patient   returned to the PACU in stable condition.  All instruments counts were correct   at the end of the procedure.       ____________________________________     MD DOC Babin / EM[place]    DD:  07/27/2019 11:54:02  DT:  07/27/2019 12:18:42    D#:  [id number]  Job#:  [id number]

## 2019-07-27 NOTE — OR NURSING
Pt A&OX4. VSS. Afebrile. On room air, weaned off oxygen in pacu. Two lap sites to abd with dermabond closure, ice pack in place. LLQ colostomy in place, collection bag with good seal and clipped in place. No output at this time. Pt denies pain. No nausea, tolerated sips of water. Report called to ZAIRA Lara. Awaiting transport.

## 2019-07-27 NOTE — WOUND TEAM
"Ostomy Pre-Operative Marking and Teaching       Date of Surgery: 19  Type of Surgery: diverting colostomy  Surgeon: Dr. Salgado    Subjective:    Objective: Assessed patient to identify potential stoma site within his/her line of site on a flat pouching surface, within the rectus muscle, away from belt-line, bony prominences, exiting scars and umbilicus.    Assessment:  Potential site (s) located for: Colostomy__X__, Ileostomy____  Urostomy Other_________  1. Procedure explained to the patient.  2. Rectus muscle identified with patient in supine position.  3. Appropriate abdominal quadrant for planned surgical procedure identified.  4. Existing scars identified.X  5. Potential sites evaluated with patient:      a. Supine      b. Sitting partially in low air loss bed        6. Site (s) selected with respect to skin folds, creases, abdominal contours and belt line.  7. Special considerations:      a. Wheel chair bound X      b. Child      c. Continent procedure      d. Patient with multiple stomas      e. Ambulatory  8. Potential site (s) marked with an \"X\" (skin prepped with skin protectant wipe, alexa applied with indelible marker, alexa fixed with skin protectant wipe again.                       Site(s) marked:  #1 LLQ, #2 LUQ    Patient Teachin. Reviewed nature of surgical procedure.  2. Reviewed basic anatomy and function of the GI or  Tract.  3. Reviewed and provided the patient with literature-Colostomy  4. Pouching system with hands on demonstrated.  5. Questions and concerns addressed  6. Other    Plan: Patient reports he had a diverting colostomy about 50 years ago for a very short time. Has scar tissue laterally on LLQ. Reports he would prefer to have a disposable pouch instead of reusable pouches. Plan for ostomy nurses to see patient post op day #1, and continuing teaching throughout hospitalization.    "

## 2019-07-27 NOTE — ANESTHESIA POSTPROCEDURE EVALUATION
Patient: Osvaldo Pate    Procedure Summary     Date:  07/27/19 Room / Location:  [place] [id number] / SURGERY [facility]    Anesthesia Start:  0705 Anesthesia Stop:  0825    Procedure:  CREATION, COLOSTOMY -  placement (N/A Abdomen) Diagnosis:  (paraplegia and incontinence)    Surgeon:  Elías Hannah M.D. Responsible Provider:  Lucian Rey M.D.    Anesthesia Type:  general ASA Status:  3          Final Anesthesia Type: general  Last vitals  BP   Blood Pressure : 152/96, NIBP: 105/66    Temp   37.2 °C (99 °F)    Pulse   Pulse: 78, Heart Rate (Monitored): 96   Resp   15    SpO2   100 %      Anesthesia Post Evaluation    Patient location during evaluation: PACU  Patient participation: complete - patient participated  Level of consciousness: awake and alert  Pain score: 0    Airway patency: patent  Anesthetic complications: no  Cardiovascular status: hemodynamically stable  Respiratory status: acceptable  Hydration status: euvolemic    PONV: none           Nurse Pain Score: 0 (NPRS)

## 2019-07-27 NOTE — PROGRESS NOTES
Pt arrived back from OR. Pt is A&Ox4, VSS. There are two lap sites to abdominal area with dermabond in place. There is a colostomy in place in LLQ, stoma appears beety red color and has adequate seal with colostomy bad. Pt was educated regarding low fiber gi soft diet. Pt denies pain at this time. POC discussed with pt, all questions answered at this time.

## 2019-07-27 NOTE — PROGRESS NOTES
"INTERVENTIONAL RADIOLOGY PROCEDURE CONSULT - SHORT NOTE    Procedure Requested: COCCYGEAL BONE BX    Date of request: 7/27/2019    Date of consultation: 7/27/2019    Requesting Provider: MICHA GONZALES    Summary:  PARAPLEGIC PT WITH LARGE DECUBITUS ULCERATED LESION OVER COCCYX     Imaging Findings:  CT PELVIS: LARGE SOFT TISSUE WOUND WITH UNDERLYING SEGMENTAL SACRO-COCCYGEAL DESTRUCTION. NO REAL TARGET FOR BX. SMALL FRAGMENTS OF \"FLOATING\" DISTAL COCCYGEAL SEGMENTS. NEEDLE BX NOT FEASIBLE WITHOUT CROSSING ULCER CAVITY.     Interventional Radiology Recommendation:  BEST OPTION TO OBTAIN COCCYGEAL BONE TISSUE AT MARGINS OF DESTRUCTION WOULD BE OPEN DEBRIDEMENT AT ULCER SITE AND USE OF RONGEURS TO OBTAIN \"FRESH BONE\" AT THE MARGINS.     DISCUSSED WITH MICHA GONZALES 1045 AM 7/27/19 7/27/2019 10:50 AM Osvaldo Medina      "

## 2019-07-27 NOTE — ANESTHESIA PREPROCEDURE EVALUATION
Decubitus ulcer with incontience    Relevant Problems   (+) Anemia   (+) Essential hypertension   (+) Osteomyelitis of coccyx (HCC)   (+) Paraplegia (HCC)   (+) Paroxysmal atrial flutter (HCC)   GERD        Physical Exam    Airway   Mallampati: II  TM distance: >3 FB  Neck ROM: limited       Cardiovascular - normal exam  Rhythm: regular  Rate: normal  (-) murmur     Dental - normal exam         Pulmonary - normal exam  Breath sounds clear to auscultation     Abdominal    Neurological - normal exam                 Anesthesia Plan    ASA 3       Plan - general       Airway plan will be ETT        Induction: intravenous    Postoperative Plan: Postoperative administration of opioids is intended.    Pertinent diagnostic labs and testing reviewed    Informed Consent:    Anesthetic plan and risks discussed with patient.    Use of blood products discussed with: patient whom consented to blood products.
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
## 2019-07-27 NOTE — ANESTHESIA TIME REPORT
Anesthesia Start and Stop Event Times     Date Time Event    7/27/2019 0705 Anesthesia Start     0825 Anesthesia Stop        Responsible Staff  07/27/19    Name Role Begin End    Lucian Rey M.D. Anesth 0705 0825        Preop Diagnosis (Free Text):  Pre-op Diagnosis     paraplegia and incontinence        Preop Diagnosis (Codes):  Diagnosis Information     Diagnosis Code(s):         Post op Diagnosis  Sacral decubitus ulcer      Premium Reason  E. Weekend    Comments:

## 2019-07-27 NOTE — ANESTHESIA TIME REPORT
Anesthesia Start and Stop Event Times     Date Time Event    7/27/2019 0705 Anesthesia Start        Responsible Staff  07/27/19    Name Role Begin End    Lucian Rey M.D. Anesth 0705         Preop Diagnosis (Free Text):  Pre-op Diagnosis     paraplegia and incontinence        Preop Diagnosis (Codes):  Diagnosis Information     Diagnosis Code(s):         Post op Diagnosis  Sacral decubitus ulcer      Premium Reason  E. Weekend    Comments:

## 2019-07-27 NOTE — CARE PLAN
Problem: Communication  Goal: The ability to communicate needs accurately and effectively will improve  Outcome: PROGRESSING AS EXPECTED  Pt was encouraged to voice feelings, therapeutic communication was utilized.     Problem: Knowledge Deficit  Goal: Knowledge of disease process/condition, treatment plan, diagnostic tests, and medications will improve  Outcome: PROGRESSING AS EXPECTED  Pt educated regarding plan of care and medications. All questions answered.

## 2019-07-27 NOTE — PROGRESS NOTES
Pt alert and oriented x4. Offered snacks and fluids. Turned to sides every 2 hours. Safety precautions in placed. Bed in lowest position. Treaded socks on. Upper side rails up. Reinforced the use of call light when needing assistance.

## 2019-07-27 NOTE — PROGRESS NOTES
Infectious Disease Progress Note    Author: Rosmery Crabtree M.D. Date & Time of service: 2019  2:12 PM    Chief Complaint:  Sacral decubitus  Sacral osteomyelitis    Interval History:  69 y.o. WM with  C5- 7 complete quadriplegia admitted 2019 from [facility] on 2019 for wound check on his coccyx. Large decub to bone   AF WBC 5.9  seen with wound care-no surrounding cellulitis. Planned for CT and biopsy today   AF n IR declined to biopsy sacral bone. No fever or chills-had colostomy done this am  Labs Reviewed, Medications Reviewed, Radiology Reviewed and Wound Reviewed.    Review of Systems:  Review of Systems   Constitutional: Negative for chills and fever.   Neurological: Positive for sensory change and focal weakness.   All other systems reviewed and are negative.      Hemodynamics:  Temp (24hrs), Av.9 °C (98.5 °F), Min:36.5 °C (97.7 °F), Max:37.2 °C (99 °F)  Temperature: 36.9 °C (98.4 °F)  Pulse  Av.8  Min: 63  Max: 80Heart Rate (Monitored): 76  Blood Pressure : 142/86, NIBP: 100/61       Physical Exam:  Physical Exam   Constitutional: He is oriented to person, place, and time. No distress.   HENT:   Mouth/Throat: No oropharyngeal exudate.   Eyes: Pupils are equal, round, and reactive to light. EOM are normal. No scleral icterus.   Neck: No JVD present.   Cardiovascular: Normal rate.    Pulmonary/Chest: Effort normal. No stridor. No respiratory distress. He has no wheezes.   Abdominal: Soft. He exhibits no distension.   dressed   Musculoskeletal: He exhibits edema.   Muscle wasting   Neurological: He is alert and oriented to person, place, and time. He displays abnormal reflex. He exhibits abnormal muscle tone. Coordination abnormal.   Skin: Skin is warm. No rash noted. He is not diaphoretic. No erythema.   Deep sacral ulceration 8 cm X 7 cm X 5 cm with visible bone   Nursing note and vitals reviewed.      Meds:    Current Facility-Administered Medications:    •  ferrous sulfate  •  traZODone  •  ascorbic acid  •  baclofen  •  celecoxib  •  finasteride  •  flecainide  •  losartan  •  metoprolol  •  therapeutic multivitamin-minerals  •  acetaminophen  •  Notify provider if pain remains uncontrolled **AND** Use the numeric rating scale (NRS-11) on regular floors and Critical-Care Pain Observation Tool (CPOT) on ICUs/Trauma to assess pain **AND** Pulse Ox (Oximetry) **AND** Pharmacy Consult Request **AND** If patient difficult to arouse and/or has respiratory depression, stop any opiates that are currently infusing and call a Rapid Response. **AND** oxyCODONE immediate-release **AND** oxyCODONE immediate-release **AND** HYDROmorphone  •  ondansetron  •  ondansetron  •  lactobacillus rhamnosus    Labs:  Recent Labs      07/25/19 0158 07/26/19 0211 07/27/19   0044   WBC  6.7  5.9  5.9   RBC  3.08*  3.36*  3.31*   HEMOGLOBIN  7.5*  8.3*  8.3*   HEMATOCRIT  26.0*  27.9*  27.5*   MCV  84.4  83.0  83.1   MCH  24.4*  24.7*  25.1*   RDW  54.6*  54.0*  53.0*   PLATELETCT  348  362  342   MPV  8.3*  8.2*  8.2*   NEUTSPOLYS  68.50   --    --    LYMPHOCYTES  19.10*   --    --    MONOCYTES  9.00   --    --    EOSINOPHILS  2.20   --    --    BASOPHILS  0.30   --    --      Recent Labs      07/25/19 0158 07/26/19 0211   SODIUM  142  143   POTASSIUM  3.8  3.7   CHLORIDE  109  111   CO2  25  24   GLUCOSE  96  93   BUN  9  8     Recent Labs      07/25/19 0158  07/26/19 0211   CREATININE  0.66  0.55       Imaging:  Dx-chest-portable (1 View)    Result Date: 7/24/2019 7/24/2019 11:57 AM HISTORY/REASON FOR EXAM:  Sepsis protocol. Atrial fibrillation. Hypertension. TECHNIQUE/EXAM DESCRIPTION AND NUMBER OF VIEWS: Single portable view of the chest. COMPARISON: None available. FINDINGS: The mediastinal and cardiac silhouette is enlarged. The pulmonary vascularity is within normal limits. The lung parenchyma is clear. There is no significant pleural effusion. There is no visible  pneumothorax. There are postsurgical changes in the cervical thoracic spine. There are right lateral rib fractures, probably old.     1.  There is no acute cardiopulmonary process. 2.  There is an enlarged cardiac silhouette.      Micro:  Results     Procedure Component Value Units Date/Time    CULTURE WOUND W/ GRAM STAIN [[id number]]  (Abnormal)  (Susceptibility) Collected:  07/24/19 1315    Order Status:  Completed Specimen:  Wound from Buttock Updated:  07/27/19 0911     Significant Indicator POS (POS)     Source WND     Site COCCYX     Culture Result Rare growth mixed skin ade. (A)     Gram Stain Result Many WBCs.  Many Gram positive cocci.  Moderate Gram negative rods.  Few Gram positive rods.       Culture Result Streptococcus constellatus/milleri  Light growth   (A)      Methicillin Resistant Staphylococcus aureus  Rare growth   (A)      Bacteroides fragilis  Moderate growth   (A)    Narrative:       CALL  [facility]  61 tel. [phone],  CALLED  61 tel. [phone] 07/26/2019, 08:26, RB PERF. RESULTS CALLED TO:[id number]  Swab received    Culture & Susceptibility     METHICILLIN RESISTANT STAPHYLOCOCCUS AUREUS     Antibiotic Sensitivity Microscan Unit Status    Ampicillin/sulbactam Resistant <=8/4 mcg/mL Final    Method: ROSE    Clindamycin Resistant >4 mcg/mL Final    Method: ROSE    Daptomycin Sensitive <=0.5 mcg/mL Final    Method: ROSE    Erythromycin Resistant >4 mcg/mL Final    Method: ROSE    Moxifloxacin Resistant >4 mcg/mL Final    Method: ROSE    Oxacillin Resistant >2 mcg/mL Final    Method: ROSE    Penicillin Resistant >8 mcg/mL Final    Method: ROSE    Tetracycline Sensitive <=4 mcg/mL Final    Method: ROSE    Trimeth/Sulfa Sensitive <=0.5/9.5 mcg/mL Final    Method: ROSE    Vancomycin Sensitive 1 mcg/mL Final    Method: ROSE                       URINE CULTURE(NEW) [[id number]] Collected:  07/24/19 1158    Order Status:  Completed Specimen:  Urine Updated:  07/26/19 0819     Significant Indicator NEG     Source UR  "    Site -     Culture Result No growth at 48 hours.    Narrative:       Indication for culture:->Emergency Room Patient  Indication for culture:->Emergency Room Patient    BLOOD CULTURE [[id number]] Collected:  07/24/19 1201    Order Status:  Completed Specimen:  Blood from Peripheral Updated:  07/25/19 0919     Significant Indicator NEG     Source BLD     Site PERIPHERAL     Culture Result No Growth  Note: Blood cultures are incubated for 5 days and  are monitored continuously.Positive blood cultures  are called to the RN and reported as soon as  they are identified.      Narrative:       Per Hospital Policy: Only change Specimen Src: to \"Line\" if  specified by physician order.  No site indicated    BLOOD CULTURE [[id number]] Collected:  07/24/19 1215    Order Status:  Completed Specimen:  Blood from Peripheral Updated:  07/25/19 0919     Significant Indicator NEG     Source BLD     Site PERIPHERAL     Culture Result No Growth  Note: Blood cultures are incubated for 5 days and  are monitored continuously.Positive blood cultures  are called to the RN and reported as soon as  they are identified.      Narrative:       Per Hospital Policy: Only change Specimen Src: to \"Line\" if  specified by physician order.  No site indicated    GRAM STAIN [[id number]] Collected:  07/24/19 1315    Order Status:  Completed Specimen:  Wound Updated:  07/24/19 1635     Significant Indicator .     Source WND     Site COCCYX     Gram Stain Result Many WBCs.  Many Gram positive cocci.  Moderate Gram negative rods.  Few Gram positive rods.      Narrative:       Swab received    URINALYSIS [[id number]] Collected:  07/24/19 1158    Order Status:  Completed Specimen:  Urine Updated:  07/24/19 1216     Color Yellow     Character Clear     Specific Gravity 1.015     Ph 6.5     Glucose Negative mg/dL      Ketones Negative mg/dL      Protein Negative mg/dL      Bilirubin Negative     Urobilinogen, Urine 0.2     Nitrite Negative     Leukocyte Esterase " Negative     Occult Blood Negative     Micro Urine Req see below     Comment: Microscopic examination not performed when specimen is clear  and chemically negative for protein, blood, leukocyte esterase  and nitrite.         Narrative:       Indication for culture:->Emergency Room Patient          Assessment:  Active Hospital Problems    Diagnosis   • *Osteomyelitis of coccyx ([facility]) [M86.9]   • Anemia [D64.9]   • Pressure injury of sacral region, stage 4 ([facility]) [L89.154]   • Paraplegia ([facility]) [G82.20]   • Neurogenic bladder [N31.9]       Plan:  Sacral decubitis, Stage IV  Sacral OM  Fluid collection, concern for abscess base of penis, new  Afebrile  No leukocytosis  Wound culture polymicrobial (MRSA, Bfrag, strep so far)  Blood cxs neg   Planned for bone biopsy to guide abx therapy-not done by Bren recommended consult Ortho for debridement of any necrotic bone/tissues  Refused MRI-OSH XRAY + OM by report  s/p diverting colostomy  CT showed large decub, assoc OM likely, possible abscess at base of penis  Due possible abscess, will start prior to biopsy- vancomycin and Zosyn  Wound care    Cervical quad  Impediment to offloading and healing    Discussed with internal medicine.

## 2019-07-27 NOTE — PROGRESS NOTES
2 RN skin check complete with ZAIRA Frausto  Devices in place; hutchins catheter and moon boots.  Skin assessed under devices Yes; pink and blanching.  Confirmed pressure ulcers found on sacral area.  New potential pressure ulcers noted on N/A. Wound consult placed N/A      Noted pink and blanching on facial area. Pink and blanching bilateral ears. Pink and blanching bilateral elbows. Pink and blanching penile area. Scar on left upper legs. Scabs and skin tear noted in left lower extremities. Pale and blanching bilateral heels.         The following interventions in place; on low airloss mattress. Turned to sides every 2 hours. Reinforced dressings. On moon boots.

## 2019-07-27 NOTE — PROGRESS NOTES
Pt seen and examined in preop, H&P up to date.  Marked by wound care.  To OR this am for lap colostomy creation.

## 2019-07-27 NOTE — PROGRESS NOTES
"Pharmacy Kinetics 69 y.o. male on vancomycin day # 1   2019    Currently on Vancomycin 1,800mg iv q24hr (0900) - starting tomorrow   19: Loading dose 25mg/Kg x one     Indication for Treatment: Sacral decubitis, Sacral OM     Pertinent history per medical record: Admitted on 2019 for Infected decubitus ulcer. 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 ID consulted.     Other antibiotics: Zosyn 3.375g IV q8h     Allergies: Sulfa drugs     List concerns for renal function: Hypoalbuminemia, Paraplegia (falsely low SCr), Age, concomitant Zosyn     Pertinent cultures to date:   19: Coccyx wound cx - Strep constellatus, MRSA (Vanco ROSE = 1), Bacteroides fragilis     MRSA nares swab if pneumonia is a concern (ordered/positive/negative/n-a): n/a    Recent Labs      19   0158  19   0211  19   0044   WBC  6.7  5.9  5.9   NEUTSPOLYS  68.50   --    --      Recent Labs      19   0158  19   0211   BUN  9  8   CREATININE  0.66  0.55     Recent Labs      19   1349   VANCOTROUGH  2.9*     Intake/Output Summary (Last 24 hours) at 19 1455  Last data filed at 19 0813   Gross per 24 hour   Intake             1530 ml   Output             1920 ml   Net             -390 ml      /86   Pulse 76   Temp 36.9 °C (98.4 °F)   Resp 14   Ht 1.778 m (5' 10\")   Wt 89.7 kg (197 lb 12 oz)   SpO2 95%  Temp (24hrs), Av.9 °C (98.5 °F), Min:36.5 °C (97.7 °F), Max:37.2 °C (99 °F)      A/P   1. Vancomycin dose change: Re-start/ New start   2. Next vancomycin level: Prior to 3rd dose, not yet ordered   3. Goal trough: 12 - 16 mcg/mL   4. Comments: Culture resulted with MRSA, Vancomycin ROSE of 1. ID consulted, therapy escalated. Plan to monitor % increases in SCr while on Vancomycin therapy (& concomitant Zosyn). Plan for first trough level prior the 3rd dose of vancomycin. No leukocytosis, Afebrile, VS stable.     Lisa Salgado, PharmD      "

## 2019-07-27 NOTE — OR SURGEON
Immediate Post OP Note    PreOp Diagnosis: Decubitus Ulcer; Paraplegia; Incontinence    PostOp Diagnosis: same    Procedure(s):  CREATION, COLOSTOMY -  placement - Wound Class: Contaminated    Surgeon(s):  Elías Hannah M.D.    Anesthesiologist/Type of Anesthesia:  Anesthesiologist: Lucian Rey M.D./General    Surgical Staff:  Assistant: GREGG Draper  Circulator: Vinnie Morrison R.N.  Scrub Person: Jo Ann Pete    Specimens removed if any:  * No specimens in log *    Estimated Blood Loss: 10cc    Findings: Adhesions from previous colon surgery removed.  Colon serosa repaired with hemostasis.  Loop colostomy placed in LLQ at pre marked site.      Complications: none        7/27/2019 8:54 AM Elías Hannah M.D.

## 2019-07-27 NOTE — ANESTHESIA QCDR
2019 [facility] Clinical Data Registry (for Quality Improvement)     Postoperative nausea/vomiting risk protocol (Adult = 18 yrs and Pediatric 3-17 yrs)- (430 and 463)  General inhalation anesthetic (NOT TIVA) with PONV risk factors: No  Provision of anti-emetic therapy with at least 2 different classes of agents: N/A  Patient DID NOT receive anti-emetic therapy and reason is documented in Medical Record: N/A    Multimodal Pain Management- (AQI59)  Patient undergoing Elective Surgery (i.e. Outpatient, or ASC, or Prescheduled Surgery prior to Hospital Admission): No  Use of Multimodal Pain Management, two or more drugs and/or interventions, NOT including systemic opioids: N/A  Exception: Documented allergy to multiple classes of analgesics: N/A    PACU assessment of acute postoperative pain prior to Anesthesia Care End- Applies to Patients Age = 18- (ABG7)  Initial PACU pain score is which of the following: < 7/10  Patient unable to report pain score: N/A    Post-anesthetic transfer of care checklist/protocol to PACU/ICU- (426 and 427)  Upon conclusion of case, patient transferred to which of the following locations: PACU/Non-ICU  Use of transfer checklist/protocol: Yes  Exclusion: Service Performed in Patient Hospital Room (and thus did not require transfer): N/A    PACU Reintubation- (AQI31)  General anesthesia requiring endotracheal intubation (ETT) along with subsequent extubation in OR or PACU: Yes  Required reintubation in the PACU: No   Extubation was a planned trial documented in the medical record prior to removal of the original airway device:  N/A    Unplanned admission to ICU related to anesthesia service up through end of PACU care- (MD51)  Unplanned admission to ICU (not initially anticipated at anesthesia start time): No

## 2019-07-27 NOTE — ANESTHESIA PROCEDURE NOTES
Airway  Date/Time: 7/27/2019 7:19 AM  Performed by: IFEOMA LUGO  Authorized by: IFEOMA LUGO     Location:  OR  Urgency:  Elective  Difficult Airway: No    Indications for Airway Management:  Anesthesia  Spontaneous Ventilation: absent    Sedation Level:  Deep  Preoxygenated: Yes    Patient Position:  Sniffing  Mask Difficulty Assessment:  2 - vent by mask + OA or adjuvant +/- NMBA  Final Airway Type:  Endotracheal airway  Final Endotracheal Airway:  ETT  Cuffed: Yes    Technique Used for Successful ETT Placement:  Video laryngoscopy  Insertion Site:  Oral  Laryngoscope Blade/Videolaryngoscope Blade Size:  4  ETT Size (mm):  7.0  Measured from:  Teeth  ETT to Teeth (cm):  24  Placement Verified by: auscultation and capnometry    Cormack-Lehane Classification:  Grade I - full view of glottis  Number of Attempts at Approach:  1   Atraumatic DLx1 with glidescope

## 2019-07-28 LAB
ANION GAP SERPL CALC-SCNC: 7 MMOL/L (ref 0–11.9)
BUN SERPL-MCNC: 8 MG/DL (ref 8–22)
CALCIUM SERPL-MCNC: 8.5 MG/DL (ref 8.5–10.5)
CHLORIDE SERPL-SCNC: 109 MMOL/L (ref 96–112)
CO2 SERPL-SCNC: 25 MMOL/L (ref 20–33)
CREAT SERPL-MCNC: 0.51 MG/DL (ref 0.5–1.4)
ERYTHROCYTE [DISTWIDTH] IN BLOOD BY AUTOMATED COUNT: 54.1 FL (ref 35.9–50)
GLUCOSE SERPL-MCNC: 92 MG/DL (ref 65–99)
HCT VFR BLD AUTO: 28.5 % (ref 42–52)
HGB BLD-MCNC: 8.4 G/DL (ref 14–18)
MCH RBC QN AUTO: 24.8 PG (ref 27–33)
MCHC RBC AUTO-ENTMCNC: 29.5 G/DL (ref 33.7–35.3)
MCV RBC AUTO: 84.1 FL (ref 81.4–97.8)
PLATELET # BLD AUTO: 313 K/UL (ref 164–446)
PMV BLD AUTO: 8 FL (ref 9–12.9)
POTASSIUM SERPL-SCNC: 3.9 MMOL/L (ref 3.6–5.5)
RBC # BLD AUTO: 3.39 M/UL (ref 4.7–6.1)
SODIUM SERPL-SCNC: 141 MMOL/L (ref 135–145)
WBC # BLD AUTO: 7.1 K/UL (ref 4.8–10.8)

## 2019-07-28 PROCEDURE — 99232 SBSQ HOSP IP/OBS MODERATE 35: CPT | Performed by: HOSPITALIST

## 2019-07-28 PROCEDURE — 700102 HCHG RX REV CODE 250 W/ 637 OVERRIDE(OP): Performed by: FAMILY MEDICINE

## 2019-07-28 PROCEDURE — 700102 HCHG RX REV CODE 250 W/ 637 OVERRIDE(OP): Performed by: HOSPITALIST

## 2019-07-28 PROCEDURE — 770001 HCHG ROOM/CARE - MED/SURG/GYN PRIV*

## 2019-07-28 PROCEDURE — A9270 NON-COVERED ITEM OR SERVICE: HCPCS | Performed by: NURSE PRACTITIONER

## 2019-07-28 PROCEDURE — 306263 VAC CANNISTER W/GEL 500ML: Performed by: HOSPITALIST

## 2019-07-28 PROCEDURE — A9270 NON-COVERED ITEM OR SERVICE: HCPCS | Performed by: FAMILY MEDICINE

## 2019-07-28 PROCEDURE — 85027 COMPLETE CBC AUTOMATED: CPT

## 2019-07-28 PROCEDURE — 99233 SBSQ HOSP IP/OBS HIGH 50: CPT | Performed by: INTERNAL MEDICINE

## 2019-07-28 PROCEDURE — 700111 HCHG RX REV CODE 636 W/ 250 OVERRIDE (IP): Performed by: INTERNAL MEDICINE

## 2019-07-28 PROCEDURE — 700105 HCHG RX REV CODE 258: Performed by: INTERNAL MEDICINE

## 2019-07-28 PROCEDURE — 36415 COLL VENOUS BLD VENIPUNCTURE: CPT

## 2019-07-28 PROCEDURE — 700111 HCHG RX REV CODE 636 W/ 250 OVERRIDE (IP): Performed by: HOSPITALIST

## 2019-07-28 PROCEDURE — A9270 NON-COVERED ITEM OR SERVICE: HCPCS | Performed by: HOSPITALIST

## 2019-07-28 PROCEDURE — 700105 HCHG RX REV CODE 258: Performed by: HOSPITALIST

## 2019-07-28 PROCEDURE — 700102 HCHG RX REV CODE 250 W/ 637 OVERRIDE(OP): Performed by: NURSE PRACTITIONER

## 2019-07-28 PROCEDURE — 80048 BASIC METABOLIC PNL TOTAL CA: CPT

## 2019-07-28 RX ADMIN — PIPERACILLIN AND TAZOBACTAM 3.38 G: 3; .375 INJECTION, POWDER, LYOPHILIZED, FOR SOLUTION INTRAVENOUS; PARENTERAL at 06:06

## 2019-07-28 RX ADMIN — FERROUS SULFATE TAB 325 MG (65 MG ELEMENTAL FE) 325 MG: 325 (65 FE) TAB at 08:02

## 2019-07-28 RX ADMIN — MULTIPLE VITAMINS W/ MINERALS TAB 1 TABLET: TAB at 06:06

## 2019-07-28 RX ADMIN — FERROUS SULFATE TAB 325 MG (65 MG ELEMENTAL FE) 325 MG: 325 (65 FE) TAB at 17:03

## 2019-07-28 RX ADMIN — VANCOMYCIN HYDROCHLORIDE 1800 MG: 500 INJECTION, POWDER, LYOPHILIZED, FOR SOLUTION INTRAVENOUS at 12:09

## 2019-07-28 RX ADMIN — FLECAINIDE ACETATE 25 MG: 50 TABLET ORAL at 06:08

## 2019-07-28 RX ADMIN — METOPROLOL TARTRATE 25 MG: 25 TABLET, FILM COATED ORAL at 06:06

## 2019-07-28 RX ADMIN — OXYCODONE HYDROCHLORIDE AND ACETAMINOPHEN 1000 MG: 500 TABLET ORAL at 06:06

## 2019-07-28 RX ADMIN — Medication 1 CAPSULE: at 08:02

## 2019-07-28 RX ADMIN — CELECOXIB 200 MG: 200 CAPSULE ORAL at 06:06

## 2019-07-28 RX ADMIN — BACLOFEN 10 MG: 10 TABLET ORAL at 17:03

## 2019-07-28 RX ADMIN — PIPERACILLIN AND TAZOBACTAM 3.38 G: 3; .375 INJECTION, POWDER, LYOPHILIZED, FOR SOLUTION INTRAVENOUS; PARENTERAL at 17:04

## 2019-07-28 RX ADMIN — TRAZODONE HYDROCHLORIDE 50 MG: 50 TABLET ORAL at 22:48

## 2019-07-28 RX ADMIN — FINASTERIDE 5 MG: 5 TABLET, FILM COATED ORAL at 06:07

## 2019-07-28 RX ADMIN — BACLOFEN 10 MG: 10 TABLET ORAL at 08:02

## 2019-07-28 RX ADMIN — METOPROLOL TARTRATE 25 MG: 25 TABLET, FILM COATED ORAL at 17:02

## 2019-07-28 RX ADMIN — BACLOFEN 10 MG: 10 TABLET ORAL at 12:08

## 2019-07-28 RX ADMIN — LOSARTAN POTASSIUM 25 MG: 25 TABLET ORAL at 06:08

## 2019-07-28 RX ADMIN — FLECAINIDE ACETATE 25 MG: 50 TABLET ORAL at 17:02

## 2019-07-28 RX ADMIN — TRAZODONE HYDROCHLORIDE 50 MG: 50 TABLET ORAL at 01:19

## 2019-07-28 RX ADMIN — BACLOFEN 10 MG: 10 TABLET ORAL at 20:52

## 2019-07-28 ASSESSMENT — ENCOUNTER SYMPTOMS
SENSORY CHANGE: 1
FOCAL WEAKNESS: 1
HEADACHES: 0
NAUSEA: 0
VOMITING: 0
FEVER: 0
ABDOMINAL PAIN: 0
DIZZINESS: 0
COUGH: 0
SHORTNESS OF BREATH: 0
CHILLS: 0

## 2019-07-28 NOTE — PROGRESS NOTES
69 year old patient, admitted for osteomyelitis on 07/24/2019.  LPS was consulted for diabetic foot ulcer, patient feet were checked and no open wound noted.  Wound team already seeing patient for sacral wound, wound VAC in place.   LPS signing off, wound team to follow.

## 2019-07-28 NOTE — CARE PLAN
Problem: Safety  Goal: Will remain free from injury  Outcome: PROGRESSING AS EXPECTED  Pt instructed to use call light, bed in low locked position, call light within reach, provided frequent checks.    Problem: Knowledge Deficit  Goal: Knowledge of disease process/condition, treatment plan, diagnostic tests, and medications will improve  Outcome: PROGRESSING AS EXPECTED  Answered Pt questions to the best of my ability, discussed POC.

## 2019-07-28 NOTE — PROGRESS NOTES
Beside report received, Pt resting in bed, no signs of distress noted, POC updated c Pt, no additional questions at this time.

## 2019-07-28 NOTE — PROGRESS NOTES
2 RN skin check complete     Pt has wound vac to sacrum     Pt has new colostomy to LLQ     Pt has hutchins in place with STAT lock    Pt has x2 lap sites with dermabond to abdomen

## 2019-07-28 NOTE — PROGRESS NOTES
Assumed care of pt.  AAOx4.  No c/o pain at this time.  Sacral wound with mepilex and wound vac dressing in place.  Left posterior calf wound with dressing in place, CDI.  Surgical lap sites to abdomen JOSE JUAN.  LLQ ostomy with brown, loose stool output.  GI soft diet in place, no c/o n/v.  Cazares in place for neurogenic bladder, clear, yellow urine noted.   POC reviewed with pt.  Call light within reach, pt educated to call for assistance as needed.  Hourly rounding in place.

## 2019-07-28 NOTE — PROGRESS NOTES
"Pharmacy Kinetics 69 y.o. male on vancomycin day # 2 2019    Currently on Vancomycin 1800 mg iv q24hr (1200)    Indication for Treatment: Sacral decubitis, Sacral OM     Pertinent history per medical record: Admitted on 2019 for Infected decubitus ulcer. 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 ID consulted.     Other antibiotics: Zosyn 3.375 g IV q8h    Allergies: Sulfa drugs     List concerns for renal function: concomitant use with Zosyn, paraplegia, age    Pertinent cultures to date:   19: Coccyx wound cx - Strep constellatus, MRSA (Vanco ROSE = 1), Bacteroides fragilis     MRSA nares swab if pneumonia is a concern (ordered/positive/negative/n-a): n/a    Recent Labs      19   0211  19   0044  19   0337   WBC  5.9  5.9  7.1     Recent Labs      19   0211  19   0337   BUN  8  8   CREATININE  0.55  0.51     Recent Labs      19   1349   VANCOTROUGH  2.9*     Intake/Output Summary (Last 24 hours) at 19 1332  Last data filed at 19 1209   Gross per 24 hour   Intake             1232 ml   Output             2425 ml   Net            -1193 ml      /85   Pulse 69   Temp 36.4 °C (97.6 °F) (Temporal)   Resp 16   Ht 1.778 m (5' 10\")   Wt 89.7 kg (197 lb 12 oz)   SpO2 94%  Temp (24hrs), Av.6 °C (97.8 °F), Min:36.4 °C (97.6 °F), Max:36.8 °C (98.2 °F)      A/P   1. Vancomycin dose change: not indicated at this time  2. Next vancomycin level: 19 at 1130  3. Goal trough: 12-16 mcg/mL  4. Comments: Patient went to OR yesterday for diverting colostomy. Vancomycin to continue for a 6 week course per ID. Renal function remains stable. Will continue with vancomycin 1800 mg IV q24h and monitor with a level tomorrow prior to third total dose. Pharmacy will continue to monitor.    Diann Argueta, PharmD      "

## 2019-07-28 NOTE — PROGRESS NOTES
"7/28  S:  69 y.o.male s/p Lap Colostomy  POD# 1.  Patient doing well overnight, having ostomy output, tolerating PO, unable to ambulate at baseline    O:  /85   Pulse 69   Temp 36.4 °C (97.6 °F) (Temporal)   Resp 16   Ht 1.778 m (5' 10\")   Wt 89.7 kg (197 lb 12 oz)   SpO2 94%   I/O last 3 completed shifts:  In: 1302 [P.O.:302; I.V.:1000]  Out: 2920 [Urine:2900]  Recent Labs      07/26/19   0211  07/28/19   0337   SODIUM  143  141   POTASSIUM  3.7  3.9   CHLORIDE  111  109   CO2  24  25   GLUCOSE  93  92   BUN  8  8   CREATININE  0.55  0.51   CALCIUM  8.4*  8.5     Recent Labs      07/26/19   0211 07/27/19   0044  07/28/19   0337   WBC  5.9  5.9  7.1   RBC  3.36*  3.31*  3.39*   HEMOGLOBIN  8.3*  8.3*  8.4*   HEMATOCRIT  27.9*  27.5*  28.5*   MCV  83.0  83.1  84.1   MCH  24.7*  25.1*  24.8*   MCHC  29.7*  30.2*  29.5*   RDW  54.0*  53.0*  54.1*   PLATELETCT  362  342  313   MPV  8.2*  8.2*  8.0*       Alert and Oriented x3, No Acute Distress  Normal Respiratory Effort  Abdomen soft, appropriately tender  Incisions/Bandages clean/dry/intact  Extremities warm and well perfused  Ostomy pink and healthy, output solid stool    A/P:  Pain control with PO meds as needed  Diet as tolerated  Fluids SLIV  Ambulate tid and ad marii  Ongoing care of Decubitious Ulcer per primary team, plastics, wound care  Ostomy teaching    "

## 2019-07-28 NOTE — WOUND TEAM
[facility]  Inpatient Services  Initial Wound and Skin Care Evaluation    Admission Date:  7/24/2019   HPI, PMH, SH: Reviewed  Unit where seen by Wound Team: [id number]    WOUND CONSULT RELATED TO:  Sacral wound and left calf     SUBJECTIVE:  Pt agreeable      Self Report / Pain Level:  0/10     OBJECTIVE:  Pt in bed, A of 1 to roll, gauze and mepilex on sacrum, adhesive foam on calf    WOUND TYPE, LOCATION, CHARACTERISTICS (Pressure ulcers: location, stage, POA or date identified)     Pressure Injury 07/24/19 sacrum stage 4 POA (Active)   Pressure Injury Stage 4 7/26/2019  1:00 PM   State of Healing Non-healing 7/26/2019  1:00 PM   Site Assessment Red;White 7/26/2019  1:00 PM   Raeann-wound Assessment Pink 7/26/2019  1:00 PM   Margins Defined edges 7/26/2019  1:00 PM   Wound Length (cm) 8 cm 7/26/2019  1:00 PM   Wound Width (cm) 7 cm 7/26/2019  1:00 PM   Wound Depth (cm) 5 cm 7/26/2019  1:00 PM   Wound Surface Area (cm^2) 56 cm^2 7/26/2019  1:00 PM   Undermining 7 cm 7/26/2019  1:00 PM   Closure Secondary intention 7/26/2019  1:00 PM   Drainage Amount Small 7/26/2019  1:00 PM   Drainage Description Serosanguineous 7/26/2019  1:00 PM   Treatments Site care;Cleansed;Irrigation 7/26/2019  1:00 PM   Cleansing Approved Wound Cleanser 7/26/2019  1:00 PM   Periwound Protectant Skin Protectant wipes to Periwound 7/26/2019  1:00 PM   Dressing Options Roll Gauze;Mepilex 7/26/2019  1:00 PM   Dressing Cleansing/Solutions Normal Saline 7/26/2019  1:00 PM   Dressing Changed Changed 7/26/2019  1:00 PM   Dressing Change Frequency Daily 7/26/2019  1:00 PM   NEXT Dressing Change  07/20/19 7/26/2019  1:00 PM   NEXT Weekly Photo (Inpatient Only) 08/02/19 7/26/2019  1:00 PM   WOUND NURSE ONLY - Odor Mild 7/26/2019  1:00 PM   WOUND NURSE ONLY - Exposed Structures Bone 7/26/2019  1:00 PM   WOUND NURSE ONLY - Tissue Type and Percentage 90 red, 10 white 7/26/2019  1:00 PM   WOUND NURSE ONLY - Time Spent with Patient (mins) 60  7/26/2019  1:00 PM       Pressure Injury 07/24/19 left calf stage 3 POA (Active)   Wound Image   7/26/2019  1:00 PM   Pressure Injury Stage 3 7/26/2019  1:00 PM   State of Healing Non-healing 7/26/2019  1:00 PM   Site Assessment Red 7/26/2019  1:00 PM   Shira-wound Assessment Pink;Dark edges 7/26/2019  1:00 PM   Margins Defined edges 7/26/2019  1:00 PM   Wound Length (cm) 1.5 cm 7/26/2019  1:00 PM   Wound Width (cm) 1 cm 7/26/2019  1:00 PM   Wound Surface Area (cm^2) 1.5 cm^2 7/26/2019  1:00 PM   Tunneling 0 cm 7/26/2019  1:00 PM   Undermining 0 cm 7/26/2019  1:00 PM   Closure Secondary intention 7/26/2019  1:00 PM   Drainage Amount Small 7/26/2019  1:00 PM   Drainage Description Serosanguineous 7/26/2019  1:00 PM   Treatments Site care;Cleansed;Irrigation 7/26/2019  1:00 PM   Cleansing Approved Wound Cleanser 7/26/2019  1:00 PM   Periwound Protectant Skin Protectant wipes to Periwound 7/26/2019  1:00 PM   Dressing Options Honey Colloid;Adhesive Foam 7/26/2019  1:00 PM   Dressing Changed Changed 7/26/2019  1:00 PM   Dressing Change Frequency Every 72 hrs 7/26/2019  1:00 PM   NEXT Dressing Change  07/29/19 7/26/2019  1:00 PM   NEXT Weekly Photo (Inpatient Only) 08/02/19 7/26/2019  1:00 PM   WOUND NURSE ONLY - Odor None 7/26/2019  1:00 PM   WOUND NURSE ONLY - Exposed Structures None 7/26/2019  1:00 PM   WOUND NURSE ONLY - Tissue Type and Percentage 100% red 7/26/2019  1:00 PM        Vascular: nt      Lab Values:    WBC:       WBC   Date/Time Value Ref Range Status   07/27/2019 12:44 AM 5.9 4.8 - 10.8 K/uL Final     AIC:    No results found for: HBA1C      Culture:   Completed nt    INTERVENTIONS BY WOUND TEAM:  Dressings removed, sacral wound cleansed with wound cleanser, vera neftali sponge placed to fill wound, paste ring on hsira wound and benzoin, sealed with drape achieved seal, Instilling 33 mL saline, for 10 minutes and every 3.5 hours with suction at 125 mmHg continuous.  mepilex x 2 under tubing and tubing secured  to mepilex.          Interdisciplinary consultation:  RN, patient    EVALUATION:  Pt awaiting further diagnostic work up of sacral wound - bone biopsy to be performed on 7/29/19.    Calf wound should heal without complication      Factors affecting wound healing:  Immobility, bone exposed in wound bed  Goals:  Steady decrease in wound area and depth weekly     NURSING PLAN OF CARE ORDERS (X):    Dressing changes: See Dressing Care orders:   X  Skin care: See Skin Care orders:   Rectal tube care: See Rectal Tube Care orders:   Other orders:      WOUND TEAM PLAN OF CARE (X):   NPWT change 3 x week:      X   Dressing changes by wound team:       Follow up as needed:   X for left calf wound  Other (explain):    Anticipated discharge plans (X):  SNF:        X pt will need VAC vera neftali wound changes for sacral wound upon discharge   Home Care:           Outpatient Wound Center:            Self Care:            Other:

## 2019-07-28 NOTE — PROGRESS NOTES
Infectious Disease Progress Note    Author: Leny Rios M.D. Date & Time of service: 2019  10:54 AM    Chief Complaint:  Sacral decubitus  Sacral osteomyelitis    Interval History:  69 y.o. WM with  C5- 7 complete quadriplegia admitted 2019 from [facility] on 2019 for wound check on his coccyx. Large decub to bone   AF WBC 5.9  seen with wound care-no surrounding cellulitis. Planned for CT and biopsy today   AF n IR declined to biopsy sacral bone. No fever or chills-had colostomy done this am   afebrile WBC 7.1 patient transferred to [facility] after colostomy creation yesterday.  He denies any abdominal pain.  Tolerating IV antibiotics.    Labs Reviewed, Medications Reviewed, Radiology Reviewed and Wound Reviewed.    Review of Systems:  Review of Systems   Constitutional: Negative for chills and fever.   Respiratory: Negative for cough and shortness of breath.    Gastrointestinal: Negative for abdominal pain.   Neurological: Positive for sensory change and focal weakness. Negative for dizziness and headaches.   Limited review of systems as patient is paraplegic    Hemodynamics:  Temp (24hrs), Av.6 °C (97.8 °F), Min:36.4 °C (97.6 °F), Max:36.8 °C (98.2 °F)  Temperature: 36.4 °C (97.6 °F)  Pulse  Av.8  Min: 63  Max: 80   Blood Pressure : 148/85       Physical Exam:  Physical Exam   Constitutional: He is oriented to person, place, and time. No distress.   HENT:   Mouth/Throat: No oropharyngeal exudate.   Eyes: Pupils are equal, round, and reactive to light. EOM are normal. No scleral icterus.   Neck: No JVD present.   Cardiovascular: Normal rate.    Pulmonary/Chest: Effort normal. No stridor. No respiratory distress. He has no wheezes.   Abdominal: Soft. He exhibits no distension.    Trocar sites clean.  Colostomy with soft stool in bag.  Stoma is pink   Musculoskeletal: He exhibits edema.   Muscle wasting   Neurological: He is alert and oriented to person,  place, and time. He displays abnormal reflex. He exhibits abnormal muscle tone. Coordination abnormal.   Skin: Skin is warm. No rash noted. He is not diaphoretic. No erythema.   Sacral wound VAC in place   Psychiatric: He has a normal mood and affect. His behavior is normal.   Very pleasant   Nursing note and vitals reviewed.      Meds:    Current Facility-Administered Medications:   •  vancomycin  •  ferrous sulfate  •  MD Alert...Vancomycin per Pharmacy  •  [COMPLETED] piperacillin-tazobactam **AND** piperacillin-tazobactam  •  traZODone  •  ascorbic acid  •  baclofen  •  celecoxib  •  finasteride  •  flecainide  •  losartan  •  metoprolol  •  therapeutic multivitamin-minerals  •  acetaminophen  •  Notify provider if pain remains uncontrolled **AND** Use the numeric rating scale (NRS-11) on regular floors and Critical-Care Pain Observation Tool (CPOT) on ICUs/Trauma to assess pain **AND** Pulse Ox (Oximetry) **AND** Pharmacy Consult Request **AND** If patient difficult to arouse and/or has respiratory depression, stop any opiates that are currently infusing and call a Rapid Response. **AND** oxyCODONE immediate-release **AND** oxyCODONE immediate-release **AND** HYDROmorphone  •  ondansetron  •  ondansetron  •  lactobacillus rhamnosus    Labs:  Recent Labs      07/26/19 0211 07/27/19 0044  07/28/19 0337   WBC  5.9  5.9  7.1   RBC  3.36*  3.31*  3.39*   HEMOGLOBIN  8.3*  8.3*  8.4*   HEMATOCRIT  27.9*  27.5*  28.5*   MCV  83.0  83.1  84.1   MCH  24.7*  25.1*  24.8*   RDW  54.0*  53.0*  54.1*   PLATELETCT  362  342  313   MPV  8.2*  8.2*  8.0*     Recent Labs      07/26/19 0211 07/28/19 0337   SODIUM  143  141   POTASSIUM  3.7  3.9   CHLORIDE  111  109   CO2  24  25   GLUCOSE  93  92   BUN  8  8     Recent Labs      07/26/19 0211 07/28/19 0337   CREATININE  0.55  0.51       Imaging:  Dx-chest-portable (1 View)    Result Date: 7/24/2019 7/24/2019 11:57 AM HISTORY/REASON FOR EXAM:  Sepsis protocol.  Atrial fibrillation. Hypertension. TECHNIQUE/EXAM DESCRIPTION AND NUMBER OF VIEWS: Single portable view of the chest. COMPARISON: None available. FINDINGS: The mediastinal and cardiac silhouette is enlarged. The pulmonary vascularity is within normal limits. The lung parenchyma is clear. There is no significant pleural effusion. There is no visible pneumothorax. There are postsurgical changes in the cervical thoracic spine. There are right lateral rib fractures, probably old.     1.  There is no acute cardiopulmonary process. 2.  There is an enlarged cardiac silhouette.      Micro:  Results     Procedure Component Value Units Date/Time    CULTURE WOUND W/ GRAM STAIN [[id number]]  (Abnormal)  (Susceptibility) Collected:  07/24/19 1315    Order Status:  Completed Specimen:  Wound from Buttock Updated:  07/27/19 0911     Significant Indicator POS (POS)     Source WND     Site COCCYX     Culture Result Rare growth mixed skin ade. (A)     Gram Stain Result Many WBCs.  Many Gram positive cocci.  Moderate Gram negative rods.  Few Gram positive rods.       Culture Result Streptococcus constellatus/milleri  Light growth   (A)      Methicillin Resistant Staphylococcus aureus  Rare growth   (A)      Bacteroides fragilis  Moderate growth   (A)    Narrative:       CALL  [facility]  61 tel. [phone],  CALLED  61 tel. [phone] 07/26/2019, 08:26, RB PERF. RESULTS CALLED TO:[id number]  Swab received    Culture & Susceptibility     METHICILLIN RESISTANT STAPHYLOCOCCUS AUREUS     Antibiotic Sensitivity Microscan Unit Status    Ampicillin/sulbactam Resistant <=8/4 mcg/mL Final    Method: ROSE    Clindamycin Resistant >4 mcg/mL Final    Method: ROSE    Daptomycin Sensitive <=0.5 mcg/mL Final    Method: ROSE    Erythromycin Resistant >4 mcg/mL Final    Method: ROSE    Moxifloxacin Resistant >4 mcg/mL Final    Method: ROSE    Oxacillin Resistant >2 mcg/mL Final    Method: ROSE    Penicillin Resistant >8 mcg/mL Final    Method: ROSE    Tetracycline  "Sensitive <=4 mcg/mL Final    Method: ROSE    Trimeth/Sulfa Sensitive <=0.5/9.5 mcg/mL Final    Method: ROSE    Vancomycin Sensitive 1 mcg/mL Final    Method: ROSE                       URINE CULTURE(NEW) [[id number]] Collected:  07/24/19 1158    Order Status:  Completed Specimen:  Urine Updated:  07/26/19 0819     Significant Indicator NEG     Source UR     Site -     Culture Result No growth at 48 hours.    Narrative:       Indication for culture:->Emergency Room Patient  Indication for culture:->Emergency Room Patient    BLOOD CULTURE [[id number]] Collected:  07/24/19 1201    Order Status:  Completed Specimen:  Blood from Peripheral Updated:  07/25/19 0919     Significant Indicator NEG     Source BLD     Site PERIPHERAL     Culture Result No Growth  Note: Blood cultures are incubated for 5 days and  are monitored continuously.Positive blood cultures  are called to the RN and reported as soon as  they are identified.      Narrative:       Per Hospital Policy: Only change Specimen Src: to \"Line\" if  specified by physician order.  No site indicated    BLOOD CULTURE [[id number]] Collected:  07/24/19 1215    Order Status:  Completed Specimen:  Blood from Peripheral Updated:  07/25/19 0919     Significant Indicator NEG     Source BLD     Site PERIPHERAL     Culture Result No Growth  Note: Blood cultures are incubated for 5 days and  are monitored continuously.Positive blood cultures  are called to the RN and reported as soon as  they are identified.      Narrative:       Per Hospital Policy: Only change Specimen Src: to \"Line\" if  specified by physician order.  No site indicated    GRAM STAIN [[id number]] Collected:  07/24/19 1315    Order Status:  Completed Specimen:  Wound Updated:  07/24/19 1635     Significant Indicator .     Source WND     Site COCCYX     Gram Stain Result Many WBCs.  Many Gram positive cocci.  Moderate Gram negative rods.  Few Gram positive rods.      Narrative:       Swab received    URINALYSIS " [[id number]] Collected:  07/24/19 1158    Order Status:  Completed Specimen:  Urine Updated:  07/24/19 1216     Color Yellow     Character Clear     Specific Gravity 1.015     Ph 6.5     Glucose Negative mg/dL      Ketones Negative mg/dL      Protein Negative mg/dL      Bilirubin Negative     Urobilinogen, Urine 0.2     Nitrite Negative     Leukocyte Esterase Negative     Occult Blood Negative     Micro Urine Req see below     Comment: Microscopic examination not performed when specimen is clear  and chemically negative for protein, blood, leukocyte esterase  and nitrite.         Narrative:       Indication for culture:->Emergency Room Patient          Assessment:  Active Hospital Problems    Diagnosis   • *Osteomyelitis of coccyx ([facility]) [M86.9]   • Anemia [D64.9]   • Pressure injury of sacral region, stage 4 ([facility]) [L89.154]   • Paraplegia ([facility]) [G82.20]   • Neurogenic bladder [N31.9]       Plan:  Sacral decubitis, Stage IV  With underlying sacral osteomyelitis  Fluid collection, concern for abscess base of penis  Afebrile  No leukocytosis  Wound culture polymicrobial (MRSA, Bfrag, strep Milleri so far)  Blood cxs on 7/24-neg   Planned for bone biopsy to guide abx therapy-not done by IR-Adam recommended consult Ortho for debridement of any necrotic bone/tissues  Refused MRI-OSH XRAY + OM by report  s/p diverting colostomy on 7/27 by Dr. Hannah  CT showed large decub, assoc OM likely, possible abscess at base of penis  Due possible abscess, IV vancomycin and Zosyn were started   Monitor renal function and Vanco trough  Vanco trough subtherapeutic on 7/26  Wound care/vac  Anticipate a 6-week course of IV antibiotics    Cervical quad  Impediment to offloading and healing    Discussed with internal medicine.

## 2019-07-28 NOTE — CARE PLAN
Problem: Communication  Goal: The ability to communicate needs accurately and effectively will improve  Outcome: PROGRESSING AS EXPECTED  POC reviewed with pt. All questions answered at this time.     Problem: Skin Integrity  Goal: Risk for impaired skin integrity will decrease  Outcome: PROGRESSING AS EXPECTED  Pt rated a moderate risk for skin breakdown. Low airloss mattress in place. Q2 hour turning in place. Heel float boots on pt.        nicotine patches, risks and benefits of this medication discussed- patient will call for any significant adverse effects. - nicotine (NICODERM CQ) 14 MG/24HR; Place 1 patch onto the skin every 24 hours  Dispense: 30 patch; Refill: 3    5. Screen for colon cancer  - POCT Fecal Immunochemical Test (FIT); Future        Requested Prescriptions     Signed Prescriptions Disp Refills    levofloxacin (LEVAQUIN) 500 MG tablet 7 tablet 0     Sig: Take 1 tablet by mouth daily for 7 days    Dextromethorphan-Guaifenesin (MUCINEX DM)  MG TB12 28 tablet 0     Sig: Take one tab po bid    predniSONE (DELTASONE) 10 MG tablet 18 tablet 0     Sig: Take 3 tablets together daily for 3 days, then 2 tablets daily for 3 days, then 1 tablet daily for 3 days.  nicotine (NICODERM CQ) 14 MG/24HR 30 patch 3     Sig: Place 1 patch onto the skin every 24 hours    benazepril-hydrochlorthiazide (LOTENSIN HCT) 20-12.5 MG per tablet 30 tablet 3     Sig: Take 1 tablet by mouth daily       There are no discontinued medications. Shelby received counseling on the following healthy behaviors: nutrition, exercise, tobacco cessation and weight reduction  Reviewed prior labs and health maintenance. Continue current medications, diet and exercise. Discussed use, benefit, and side effects of prescribed medications. Barriers to medication compliance addressed. Patient given educational materials - see patient instructions. All patient questions answered. Patient voiced understanding.
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
## 2019-07-29 LAB
BACTERIA BLD CULT: NORMAL
BACTERIA BLD CULT: NORMAL
SIGNIFICANT IND 70042: NORMAL
SIGNIFICANT IND 70042: NORMAL
SITE SITE: NORMAL
SITE SITE: NORMAL
SOURCE SOURCE: NORMAL
SOURCE SOURCE: NORMAL
VANCOMYCIN SERPL-MCNC: 8.5 UG/ML

## 2019-07-29 PROCEDURE — 700111 HCHG RX REV CODE 636 W/ 250 OVERRIDE (IP): Performed by: HOSPITALIST

## 2019-07-29 PROCEDURE — 700102 HCHG RX REV CODE 250 W/ 637 OVERRIDE(OP): Performed by: NURSE PRACTITIONER

## 2019-07-29 PROCEDURE — 770001 HCHG ROOM/CARE - MED/SURG/GYN PRIV*

## 2019-07-29 PROCEDURE — 700105 HCHG RX REV CODE 258: Performed by: INTERNAL MEDICINE

## 2019-07-29 PROCEDURE — A9270 NON-COVERED ITEM OR SERVICE: HCPCS | Performed by: NURSE PRACTITIONER

## 2019-07-29 PROCEDURE — 700102 HCHG RX REV CODE 250 W/ 637 OVERRIDE(OP): Performed by: FAMILY MEDICINE

## 2019-07-29 PROCEDURE — 700111 HCHG RX REV CODE 636 W/ 250 OVERRIDE (IP): Performed by: INTERNAL MEDICINE

## 2019-07-29 PROCEDURE — 99232 SBSQ HOSP IP/OBS MODERATE 35: CPT | Performed by: HOSPITALIST

## 2019-07-29 PROCEDURE — 700102 HCHG RX REV CODE 250 W/ 637 OVERRIDE(OP): Performed by: HOSPITALIST

## 2019-07-29 PROCEDURE — A9270 NON-COVERED ITEM OR SERVICE: HCPCS | Performed by: FAMILY MEDICINE

## 2019-07-29 PROCEDURE — 36415 COLL VENOUS BLD VENIPUNCTURE: CPT

## 2019-07-29 PROCEDURE — 700105 HCHG RX REV CODE 258: Performed by: HOSPITALIST

## 2019-07-29 PROCEDURE — 99233 SBSQ HOSP IP/OBS HIGH 50: CPT | Performed by: INTERNAL MEDICINE

## 2019-07-29 PROCEDURE — 80202 ASSAY OF VANCOMYCIN: CPT

## 2019-07-29 PROCEDURE — 700105 HCHG RX REV CODE 258

## 2019-07-29 PROCEDURE — A9270 NON-COVERED ITEM OR SERVICE: HCPCS | Performed by: HOSPITALIST

## 2019-07-29 RX ORDER — SODIUM CHLORIDE 9 MG/ML
INJECTION, SOLUTION INTRAVENOUS
Status: COMPLETED
Start: 2019-07-29 | End: 2019-07-29

## 2019-07-29 RX ADMIN — CELECOXIB 200 MG: 200 CAPSULE ORAL at 06:07

## 2019-07-29 RX ADMIN — TRAZODONE HYDROCHLORIDE 50 MG: 50 TABLET ORAL at 01:01

## 2019-07-29 RX ADMIN — BACLOFEN 10 MG: 10 TABLET ORAL at 12:19

## 2019-07-29 RX ADMIN — FERROUS SULFATE TAB 325 MG (65 MG ELEMENTAL FE) 325 MG: 325 (65 FE) TAB at 09:05

## 2019-07-29 RX ADMIN — FERROUS SULFATE TAB 325 MG (65 MG ELEMENTAL FE) 325 MG: 325 (65 FE) TAB at 17:39

## 2019-07-29 RX ADMIN — BACLOFEN 10 MG: 10 TABLET ORAL at 21:03

## 2019-07-29 RX ADMIN — AMPICILLIN SODIUM AND SULBACTAM SODIUM 3 G: 2; 1 INJECTION, POWDER, FOR SOLUTION INTRAMUSCULAR; INTRAVENOUS at 23:58

## 2019-07-29 RX ADMIN — BACLOFEN 10 MG: 10 TABLET ORAL at 09:05

## 2019-07-29 RX ADMIN — AMPICILLIN SODIUM AND SULBACTAM SODIUM 3 G: 2; 1 INJECTION, POWDER, FOR SOLUTION INTRAMUSCULAR; INTRAVENOUS at 13:28

## 2019-07-29 RX ADMIN — BACLOFEN 10 MG: 10 TABLET ORAL at 17:39

## 2019-07-29 RX ADMIN — LOSARTAN POTASSIUM 25 MG: 25 TABLET ORAL at 06:08

## 2019-07-29 RX ADMIN — FLECAINIDE ACETATE 25 MG: 50 TABLET ORAL at 06:08

## 2019-07-29 RX ADMIN — TRAZODONE HYDROCHLORIDE 50 MG: 50 TABLET ORAL at 23:58

## 2019-07-29 RX ADMIN — FINASTERIDE 5 MG: 5 TABLET, FILM COATED ORAL at 06:11

## 2019-07-29 RX ADMIN — SODIUM CHLORIDE 1000 ML: 9 INJECTION, SOLUTION INTRAVENOUS at 22:02

## 2019-07-29 RX ADMIN — Medication 1 CAPSULE: at 09:05

## 2019-07-29 RX ADMIN — VANCOMYCIN HYDROCHLORIDE 1800 MG: 500 INJECTION, POWDER, LYOPHILIZED, FOR SOLUTION INTRAVENOUS at 12:34

## 2019-07-29 RX ADMIN — PIPERACILLIN AND TAZOBACTAM 3.38 G: 3; .375 INJECTION, POWDER, LYOPHILIZED, FOR SOLUTION INTRAVENOUS; PARENTERAL at 09:05

## 2019-07-29 RX ADMIN — TRAZODONE HYDROCHLORIDE 50 MG: 50 TABLET ORAL at 22:01

## 2019-07-29 RX ADMIN — OXYCODONE HYDROCHLORIDE AND ACETAMINOPHEN 1000 MG: 500 TABLET ORAL at 06:08

## 2019-07-29 RX ADMIN — PIPERACILLIN AND TAZOBACTAM 3.38 G: 3; .375 INJECTION, POWDER, LYOPHILIZED, FOR SOLUTION INTRAVENOUS; PARENTERAL at 01:02

## 2019-07-29 RX ADMIN — MULTIPLE VITAMINS W/ MINERALS TAB 1 TABLET: TAB at 06:08

## 2019-07-29 RX ADMIN — FLECAINIDE ACETATE 25 MG: 50 TABLET ORAL at 17:39

## 2019-07-29 RX ADMIN — METOPROLOL TARTRATE 25 MG: 25 TABLET, FILM COATED ORAL at 06:08

## 2019-07-29 RX ADMIN — AMPICILLIN SODIUM AND SULBACTAM SODIUM 3 G: 2; 1 INJECTION, POWDER, FOR SOLUTION INTRAMUSCULAR; INTRAVENOUS at 17:41

## 2019-07-29 ASSESSMENT — ENCOUNTER SYMPTOMS
CHILLS: 0
NAUSEA: 0
FEVER: 0
ABDOMINAL PAIN: 0
COUGH: 0
SHORTNESS OF BREATH: 0
VOMITING: 0
DIARRHEA: 1

## 2019-07-29 NOTE — PROGRESS NOTES
Report received from NOC shift RN.  A/o x 4.   Room air.   VSS.     Labs reviewed.   Hgb at 8.4.     Pt denies any pain at this time.   LLQ ostomy is very productive this am.   X 2 lap incisions on abdomen approximated with dremabond.   Cazares catheter in place for neurogenic bladder.     Decubitus ulcer present on sacrum, wound vac in place and suction set to 125mmHg. Seal is patent. Minimal serous output in tray.   Non healing wound on left calf due for dressing change 7/30/2019      New 22g PIV on right FA placed for vancomycin.   Existing 18g on left AC running TKO and ABX.     Tolerating diet, no reports of N&V.     Call light at bedside. No additional needs.

## 2019-07-29 NOTE — CARE PLAN
Problem: Communication  Goal: The ability to communicate needs accurately and effectively will improve  Outcome: PROGRESSING AS EXPECTED  Education provided on importance of using call light to alert staff of patient needs. Pt demonstrates understanding by using call light appropriately.    Problem: Knowledge Deficit  Goal: Knowledge of disease process/condition, treatment plan, diagnostic tests, and medications will improve  Outcome: PROGRESSING AS EXPECTED  POC discussed with patient. Questions and concerns addressed.

## 2019-07-29 NOTE — PROGRESS NOTES
2 RN skin assessment performed with ZAIRA Harrell.  Lap sites x2 JOSE JUAN, no drainage.  LUQ ostomy in place. Appliance CDI.  Sacral wound with wound vac. Dressing CDI.   Preventative mepilex above sacrum underneath wound vac tubing.  Cazares catheter in place. Secured with statlock.  Left calf wound with dressing CDI.  Skin assessed underneath all devices.  Low air loss mattress in use. Q2h turns in place. Heel float boots worn.

## 2019-07-29 NOTE — PROGRESS NOTES
2 RN skin check complete:  Sacral wound with wound vac in place.  x2 surgical lap sites to abdomen, JOSE JUAN.  LUQ ostomy.  Left posterior calf dressing.  Cazares in place.   Generalized flaky, fragile integumentary.  Low airloss mattress in place.  Q2 hour turning in place.   All other areas of bony prominence with no signs of redness or breakdown noted.     Pt rated a moderate fall risk per Unger Primo fall assessment tool.  Pt educated on necessity of bed alarm for safety.  Pt choosing to refuse alarm despite education.  2 RN education provided, pt still refusing bed alarm despite education.

## 2019-07-29 NOTE — PROGRESS NOTES
"Pharmacy Kinetics 69 y.o. male on vancomycin day # 3 2019    Currently on Vancomycin 1800 mg iv q24hr    Indication for Treatment: sacral decubitis, OM    Pertinent history per medical record: Admitted on 2019 for Infected decubitus ulcer. 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 ID consulted and anticipates 6 weeks of IV abx. Colostomy created .    Other antibiotics: Unasyn    Allergies: Sulfa drugs     List concerns for renal function (possible concerns include abnormal LFTs, BUN/SCr ratio > 20:1, CHF, obesity, malnutrition/low albumin, hypermetabolic state (SIRS), pressors/hypotension, nephrotoxic drugs, etc.): low albumin, concurrent losartan, paraplegia, age    Pertinent cultures to date:   19: Coccyx wound cx - Strep constellatus, MRSA (Vanco ROSE = 1), Bacteroides fragilis    BCx 2 ngtd    MRSA nares swab if pneumonia is a concern (ordered/positive/negative/n-a): n/a    Recent Labs      19   0044  19   0337   WBC  5.9  7.1     Recent Labs      19   0337   BUN  8   CREATININE  0.51     Recent Labs      19   1151   VANCORANDOM  8.5     Intake/Output Summary (Last 24 hours) at 19 0955  Last data filed at 19 0900   Gross per 24 hour   Intake             1560 ml   Output             3110 ml   Net            -1550 ml      /81   Pulse 71   Temp 36.4 °C (97.5 °F) (Temporal)   Resp 16   Ht 1.778 m (5' 10\")   Wt 89.7 kg (197 lb 12 oz)   SpO2 98%  Temp (24hrs), Av.9 °C (98.5 °F), Min:36.4 °C (97.5 °F), Max:37.3 °C (99.2 °F)      A/P   1. Vancomycin dose change: vancomycin 1300 q12hrs (0900, 2100)  2. Next vancomycin level:  0830  3. Goal trough: 12 - 16 mcg/mL  4. Comments: Latest trough returned at 8.5 after loading dose of 2200mg, then 1 maintenance dose of 1800 mg timed 18 hrs apart. Based on first 48-hr trough of 2.9 after loading with 2200, it appears patient has vanco clearance t1/2 of ~ 13 hours. It will take approximately 16 - 21 hours for " therapeutic trough of 12 - 16 after 1800 mg dose at 1200 today. Will change dosing frequency to q12hrs and increase dose. Patient appears clinically stable while Scr remains WNL and stable, but may be deceiving as patient is paraplegic while UOP is good. Will f/u on clinical status, renal function, and micro results to optimize therapy.    Chely Bui, PharmD

## 2019-07-29 NOTE — PROGRESS NOTES
AA&Ox4.   RA. Denies SOB. Denies any pain.  Abdominal lap sites x2 JOSE JUAN and well approximated with dermabond.  Tolerating GI soft diet. Denies N/V.  + void via hutchins cathether. Replaced per policy.  + stool via LLQ osotomy. Appliance CDI.   Wound vac to sacrum. Cannister changed. Patent, no leaks. Dressing CDI.   Dressing to left calf changed per order. Next dressing change due 7/30.  All needs met at this time. Call light within reach. Pt calls appropriately.

## 2019-07-29 NOTE — PROGRESS NOTES
Moderate fall risk per Unger Primo assessment.  Patient refused bed alarm, educated regarding importance.  Education reinforced by ZAIRA Cassidy.  Patient continues to refuse.

## 2019-07-29 NOTE — PROGRESS NOTES
7/29  Pt seen and examined, tolerating PO, ostomy functioning,learning appliance changes form wound care team.  Doing well and denies any significant pain or nausea at this time    Abdomen benign  Ostomy healthy  Wounds c/d/i    Diet as tolerated  Ongoing ostomy care  Please Call with any questions

## 2019-07-29 NOTE — WOUND TEAM
"[facility]  Inpatient Services  New Ostomy Management & Teaching    HPI:  Reviewed  PMH: Reviewed   SH: Reviewed    Subjective: \"I had one when I was 19. That was 50 years ago.\"    Objective:appliance intact, small stool present, flatus     Ostomy type: loop colostomy   Stoma location: LLQ   Stoma assessment:    Appearance: pink, moist, oval    Size:1.8\"    Protrusion: ~1\"    Output:small watery with formed brown    MC jxn intact    Peristomal skin:intact     Ostomy Appliance (type and size): two piece 2.75\" with paste ring    Interventions:Discussed surgery and \"Understanding your colostomy booklet.\" Reviewed pieces of appliance, then started to remove appliance, pt completed. Skin cleaned. Pattern made and traced. Pt cut barrier, applied paste ring after having it demonstrated. Pt placed barrier on skin with assistance centering.  He closed end of pouch and then finished attaching it after it had been started distally. Secure Start completed. Encouraged pt to read booklet.      Pt education: Questions and concerns addressed    Evaluation: pt has new diverting colostomy with output. He was able to perform care after having it demonstrated.  He will need ostomy care and NPWT changes, probably SNF.     Plan: Ostomy nurses to continue to follow for ostomy needs and teaching     Anticipated discharge needs: Supplies, supplier information, possible HH or outpatient ostomy clinic   "

## 2019-07-29 NOTE — PROGRESS NOTES
Infectious Disease Progress Note    Author: Lisa Olson M.D. Date & Time of service: 2019  7:59 AM    Chief Complaint:  Sacral decubitus  Sacral osteomyelitis     Interval History:  69 y.o. WM with  C5- 7 complete quadriplegia admitted 2019 from [facility] on 2019 for wound check on his coccyx. Large decub to bone   AF WBC 5.9  seen with wound care-no surrounding cellulitis. Planned for CT and biopsy today   AF n IR declined to biopsy sacral bone. No fever or chills-had colostomy done this am   afebrile WBC 7.1 patient transferred to [facility] after colostomy creation yesterday.  He denies any abdominal pain.  Tolerating IV antibiotics.    Review of Systems:  Review of Systems   Constitutional: Negative for chills, fever and malaise/fatigue.   Gastrointestinal: Positive for diarrhea. Negative for abdominal pain, nausea and vomiting.       Hemodynamics:  Temp (24hrs), Av.9 °C (98.5 °F), Min:36.4 °C (97.6 °F), Max:37.3 °C (99.2 °F)  Temperature: 37.3 °C (99.2 °F)  Pulse  Av.5  Min: 63  Max: 85   Blood Pressure : 142/77       Physical Exam:  Physical Exam   Constitutional: He is oriented to person, place, and time. He appears well-developed and well-nourished.   HENT:   Head: Normocephalic and atraumatic.   Eyes: Pupils are equal, round, and reactive to light. Conjunctivae and EOM are normal.   Cardiovascular: Normal rate, regular rhythm and normal heart sounds.    Pulmonary/Chest: Effort normal and breath sounds normal.   Abdominal: Soft. Bowel sounds are normal. He exhibits distension. There is no tenderness. There is no rebound and no guarding.   Colostomy, surgical incision with edges well approximated, staples in place, mild redness around incision, no edema or drainage   Neurological: He is alert and oriented to person, place, and time.   Skin: Skin is warm and dry.   Wound vac in place on flank    Psychiatric: He has a normal mood and affect. His  behavior is normal.       Meds:    Current Facility-Administered Medications:   •  vancomycin  •  ferrous sulfate  •  MD Alert...Vancomycin per Pharmacy  •  [COMPLETED] piperacillin-tazobactam **AND** piperacillin-tazobactam  •  traZODone  •  ascorbic acid  •  baclofen  •  celecoxib  •  finasteride  •  flecainide  •  losartan  •  metoprolol  •  therapeutic multivitamin-minerals  •  acetaminophen  •  Notify provider if pain remains uncontrolled **AND** Use the numeric rating scale (NRS-11) on regular floors and Critical-Care Pain Observation Tool (CPOT) on ICUs/Trauma to assess pain **AND** Pulse Ox (Oximetry) **AND** Pharmacy Consult Request **AND** If patient difficult to arouse and/or has respiratory depression, stop any opiates that are currently infusing and call a Rapid Response. **AND** oxyCODONE immediate-release **AND** oxyCODONE immediate-release **AND** HYDROmorphone  •  ondansetron  •  ondansetron  •  lactobacillus rhamnosus    Labs:  Recent Labs      07/27/19   0044  07/28/19   0337   WBC  5.9  7.1   RBC  3.31*  3.39*   HEMOGLOBIN  8.3*  8.4*   HEMATOCRIT  27.5*  28.5*   MCV  83.1  84.1   MCH  25.1*  24.8*   RDW  53.0*  54.1*   PLATELETCT  342  313   MPV  8.2*  8.0*     Recent Labs      07/28/19   0337   SODIUM  141   POTASSIUM  3.9   CHLORIDE  109   CO2  25   GLUCOSE  92   BUN  8     Recent Labs      07/28/19   0337   CREATININE  0.51       Imaging:  Ct-pelvis With    Result Date: 7/26/2019 7/26/2019 4:10 PM HISTORY/REASON FOR EXAM:  Presacral wound, pelvic pain. TECHNIQUE/EXAM DESCRIPTION AND NUMBER OF VIEWS: CT scan of the pelvis with contrast. Contrast-enhanced helical scanning of the pelvis was obtained from the iliac crests through the pubic symphysis following the bolus administration of 100 mL of Omnipaque 350 nonionic contrast without complication. Low dose optimization technique was utilized for this CT exam including automated exposure control and adjustment of the mA and/or kV according to  patient size. COMPARISON:  None. FINDINGS: There is a large stage IV decubitus ulcer superficial to the distal sacrum/coccyx. It is 3.8 cm deep. The wound extends to the cortical bone surface. S5 vertebral body and a portion of the coccyx are partially absent, and may have been surgically resected or resorbed by an infectious process. Edema/stranding of presacral fat, likely related to infection. A 2 x 1.4 cm collection is seen on the undersurface of the base of the penis, away from the decubitus ulcer. Chronic widening of the pubic symphysis, etiology uncertain. Partially visualized surgical hardware in the left femur. Lumbosacral spondylosis. Moderate amount of stool throughout the colon. No pelvic lymphadenopathy. Nonobstructing left renal stone. Cazares catheter within the bladder. Air within the bladder is likely related to catheter instrumentation.     1.  A 3.8 cm deep decubitus ulcer, extending to the cortical bone, suggestive of osteomyelitis. 2.  S5 vertebral body and a portion of the coccyx are partially absent, and may have been surgically resected or resorbed by chronic osteomyelitis. 3.  Presacral edema related to infection. No presacral abscess. 4.  A 2 cm collection at the base of the penis may represent an abscess. It is away from the decubitus ulcer. 5.  Chronic widening of pubic symphysis, nonspecific. 6.  Nonobstructing left nephrolithiasis.    Dx-chest-portable (1 View)    Result Date: 7/24/2019 7/24/2019 11:57 AM HISTORY/REASON FOR EXAM:  Sepsis protocol. Atrial fibrillation. Hypertension. TECHNIQUE/EXAM DESCRIPTION AND NUMBER OF VIEWS: Single portable view of the chest. COMPARISON: None available. FINDINGS: The mediastinal and cardiac silhouette is enlarged. The pulmonary vascularity is within normal limits. The lung parenchyma is clear. There is no significant pleural effusion. There is no visible pneumothorax. There are postsurgical changes in the cervical thoracic spine. There are right  lateral rib fractures, probably old.     1.  There is no acute cardiopulmonary process. 2.  There is an enlarged cardiac silhouette.      Micro:  Results     Procedure Component Value Units Date/Time    CULTURE WOUND W/ GRAM STAIN [[id number]]  (Abnormal)  (Susceptibility) Collected:  07/24/19 1315    Order Status:  Completed Specimen:  Wound from Buttock Updated:  07/27/19 0911     Significant Indicator POS (POS)     Source WND     Site COCCYX     Culture Result Rare growth mixed skin ade. (A)     Gram Stain Result Many WBCs.  Many Gram positive cocci.  Moderate Gram negative rods.  Few Gram positive rods.       Culture Result Streptococcus constellatus/milleri  Light growth   (A)      Methicillin Resistant Staphylococcus aureus  Rare growth   (A)      Bacteroides fragilis  Moderate growth   (A)    Narrative:       CALL  Gale  61 tel. [phone],  CALLED  61 tel. [phone] 07/26/2019, 08:26, RB PERF. RESULTS CALLED TO:[id number]  Swab received    Culture & Susceptibility     METHICILLIN RESISTANT STAPHYLOCOCCUS AUREUS     Antibiotic Sensitivity Microscan Unit Status    Ampicillin/sulbactam Resistant <=8/4 mcg/mL Final    Method: ROSE    Clindamycin Resistant >4 mcg/mL Final    Method: ROSE    Daptomycin Sensitive <=0.5 mcg/mL Final    Method: ROSE    Erythromycin Resistant >4 mcg/mL Final    Method: ROSE    Moxifloxacin Resistant >4 mcg/mL Final    Method: ROSE    Oxacillin Resistant >2 mcg/mL Final    Method: ROSE    Penicillin Resistant >8 mcg/mL Final    Method: ROSE    Tetracycline Sensitive <=4 mcg/mL Final    Method: ROSE    Trimeth/Sulfa Sensitive <=0.5/9.5 mcg/mL Final    Method: RSOE    Vancomycin Sensitive 1 mcg/mL Final    Method: ROSE                       URINE CULTURE(NEW) [[id number]] Collected:  07/24/19 1158    Order Status:  Completed Specimen:  Urine Updated:  07/26/19 0819     Significant Indicator NEG     Source UR     Site -     Culture Result No growth at 48 hours.    Narrative:       Indication for  "culture:->Emergency Room Patient  Indication for culture:->Emergency Room Patient    BLOOD CULTURE [[id number]] Collected:  07/24/19 1201    Order Status:  Completed Specimen:  Blood from Peripheral Updated:  07/25/19 0919     Significant Indicator NEG     Source BLD     Site PERIPHERAL     Culture Result No Growth  Note: Blood cultures are incubated for 5 days and  are monitored continuously.Positive blood cultures  are called to the RN and reported as soon as  they are identified.      Narrative:       Per Hospital Policy: Only change Specimen Src: to \"Line\" if  specified by physician order.  No site indicated    BLOOD CULTURE [[id number]] Collected:  07/24/19 1215    Order Status:  Completed Specimen:  Blood from Peripheral Updated:  07/25/19 0919     Significant Indicator NEG     Source BLD     Site PERIPHERAL     Culture Result No Growth  Note: Blood cultures are incubated for 5 days and  are monitored continuously.Positive blood cultures  are called to the RN and reported as soon as  they are identified.      Narrative:       Per Hospital Policy: Only change Specimen Src: to \"Line\" if  specified by physician order.  No site indicated    GRAM STAIN [[id number]] Collected:  07/24/19 1315    Order Status:  Completed Specimen:  Wound Updated:  07/24/19 1635     Significant Indicator .     Source WND     Site COCCYX     Gram Stain Result Many WBCs.  Many Gram positive cocci.  Moderate Gram negative rods.  Few Gram positive rods.      Narrative:       Swab received    URINALYSIS [[id number]] Collected:  07/24/19 1158    Order Status:  Completed Specimen:  Urine Updated:  07/24/19 1216     Color Yellow     Character Clear     Specific Gravity 1.015     Ph 6.5     Glucose Negative mg/dL      Ketones Negative mg/dL      Protein Negative mg/dL      Bilirubin Negative     Urobilinogen, Urine 0.2     Nitrite Negative     Leukocyte Esterase Negative     Occult Blood Negative     Micro Urine Req see below     Comment: " Microscopic examination not performed when specimen is clear  and chemically negative for protein, blood, leukocyte esterase  and nitrite.         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   Micro reviewed    Pt continued on vancomycin, zosyn transitioned to unasyn. Pt with no complaints, awaiting surgical consults.     Plan:  Sacral decubitis, Stage IV  With underlying sacral osteomyelitis  Fluid collection, concern for abscess base of penis  Wound culture polymicrobial (MRSA, Bfrag, strep Milleri so far)  Blood cxs on 7/24-neg   Planned for bone biopsy to guide abx therapy-not done by YELENA-Adam recommended consult Ortho for debridement of any necrotic bone/tissues  Refused MRI-OSH XRAY + OM by report  s/p diverting colostomy on 7/27 by Dr. Hannah  CT on 7/26 w/ 3.8 cm deep decub ulcer extending to bone, presacral edema, 2 cm fluid collection at base of penis may be abscess  IV vancomycin and Zosyn were started on 7/24, held for a day and then restarted on 7/27- continue vanco and unasyn, duration TBD  Will attempt to see wound on next wound vac exchange   Monitor renal function and Vanco trough  Wound care/vac  Plastic surgery has been consulted - plan from plastics will influence duration of antibiotics - if flap is planned then will give 6 weeks, if not then will give a shorter course        Cervical quad  Impediment to offloading and healing     Discussed with internal medicine.  ID will follow.       Lisa  MD Wesley  Infectious Diseases

## 2019-07-30 LAB
ANION GAP SERPL CALC-SCNC: 7 MMOL/L (ref 0–11.9)
BASOPHILS # BLD AUTO: 0.3 % (ref 0–1.8)
BASOPHILS # BLD: 0.02 K/UL (ref 0–0.12)
BUN SERPL-MCNC: 7 MG/DL (ref 8–22)
CALCIUM SERPL-MCNC: 8.6 MG/DL (ref 8.5–10.5)
CHLORIDE SERPL-SCNC: 110 MMOL/L (ref 96–112)
CO2 SERPL-SCNC: 26 MMOL/L (ref 20–33)
CREAT SERPL-MCNC: 0.42 MG/DL (ref 0.5–1.4)
EOSINOPHIL # BLD AUTO: 0.16 K/UL (ref 0–0.51)
EOSINOPHIL NFR BLD: 2.8 % (ref 0–6.9)
ERYTHROCYTE [DISTWIDTH] IN BLOOD BY AUTOMATED COUNT: 55.5 FL (ref 35.9–50)
GLUCOSE SERPL-MCNC: 96 MG/DL (ref 65–99)
HCT VFR BLD AUTO: 30.1 % (ref 42–52)
HGB BLD-MCNC: 9.1 G/DL (ref 14–18)
IMM GRANULOCYTES # BLD AUTO: 0.03 K/UL (ref 0–0.11)
IMM GRANULOCYTES NFR BLD AUTO: 0.5 % (ref 0–0.9)
LYMPHOCYTES # BLD AUTO: 1.13 K/UL (ref 1–4.8)
LYMPHOCYTES NFR BLD: 19.7 % (ref 22–41)
MCH RBC QN AUTO: 25.6 PG (ref 27–33)
MCHC RBC AUTO-ENTMCNC: 30.2 G/DL (ref 33.7–35.3)
MCV RBC AUTO: 84.8 FL (ref 81.4–97.8)
MONOCYTES # BLD AUTO: 0.34 K/UL (ref 0–0.85)
MONOCYTES NFR BLD AUTO: 5.9 % (ref 0–13.4)
NEUTROPHILS # BLD AUTO: 4.07 K/UL (ref 1.82–7.42)
NEUTROPHILS NFR BLD: 70.8 % (ref 44–72)
NRBC # BLD AUTO: 0 K/UL
NRBC BLD-RTO: 0 /100 WBC
PLATELET # BLD AUTO: 291 K/UL (ref 164–446)
PMV BLD AUTO: 8.1 FL (ref 9–12.9)
POTASSIUM SERPL-SCNC: 3.9 MMOL/L (ref 3.6–5.5)
RBC # BLD AUTO: 3.55 M/UL (ref 4.7–6.1)
SODIUM SERPL-SCNC: 143 MMOL/L (ref 135–145)
WBC # BLD AUTO: 5.8 K/UL (ref 4.8–10.8)

## 2019-07-30 PROCEDURE — 700102 HCHG RX REV CODE 250 W/ 637 OVERRIDE(OP): Performed by: HOSPITALIST

## 2019-07-30 PROCEDURE — A9270 NON-COVERED ITEM OR SERVICE: HCPCS | Performed by: FAMILY MEDICINE

## 2019-07-30 PROCEDURE — 700111 HCHG RX REV CODE 636 W/ 250 OVERRIDE (IP): Performed by: INTERNAL MEDICINE

## 2019-07-30 PROCEDURE — A9270 NON-COVERED ITEM OR SERVICE: HCPCS | Performed by: HOSPITALIST

## 2019-07-30 PROCEDURE — 770001 HCHG ROOM/CARE - MED/SURG/GYN PRIV*

## 2019-07-30 PROCEDURE — 700102 HCHG RX REV CODE 250 W/ 637 OVERRIDE(OP): Performed by: FAMILY MEDICINE

## 2019-07-30 PROCEDURE — 85025 COMPLETE CBC W/AUTO DIFF WBC: CPT

## 2019-07-30 PROCEDURE — 36415 COLL VENOUS BLD VENIPUNCTURE: CPT

## 2019-07-30 PROCEDURE — A9270 NON-COVERED ITEM OR SERVICE: HCPCS | Performed by: NURSE PRACTITIONER

## 2019-07-30 PROCEDURE — 700105 HCHG RX REV CODE 258: Performed by: HOSPITALIST

## 2019-07-30 PROCEDURE — 700102 HCHG RX REV CODE 250 W/ 637 OVERRIDE(OP): Performed by: NURSE PRACTITIONER

## 2019-07-30 PROCEDURE — 99232 SBSQ HOSP IP/OBS MODERATE 35: CPT | Performed by: INTERNAL MEDICINE

## 2019-07-30 PROCEDURE — 99233 SBSQ HOSP IP/OBS HIGH 50: CPT | Performed by: INTERNAL MEDICINE

## 2019-07-30 PROCEDURE — 700111 HCHG RX REV CODE 636 W/ 250 OVERRIDE (IP): Performed by: HOSPITALIST

## 2019-07-30 PROCEDURE — 302098 PASTE RING (FLAT): Performed by: INTERNAL MEDICINE

## 2019-07-30 PROCEDURE — 97606 NEG PRS WND THER DME>50 SQCM: CPT

## 2019-07-30 PROCEDURE — 80048 BASIC METABOLIC PNL TOTAL CA: CPT

## 2019-07-30 PROCEDURE — 700105 HCHG RX REV CODE 258: Performed by: INTERNAL MEDICINE

## 2019-07-30 RX ADMIN — BACLOFEN 10 MG: 10 TABLET ORAL at 21:02

## 2019-07-30 RX ADMIN — FLECAINIDE ACETATE 25 MG: 50 TABLET ORAL at 18:44

## 2019-07-30 RX ADMIN — FERROUS SULFATE TAB 325 MG (65 MG ELEMENTAL FE) 325 MG: 325 (65 FE) TAB at 05:37

## 2019-07-30 RX ADMIN — LOSARTAN POTASSIUM 25 MG: 25 TABLET ORAL at 04:23

## 2019-07-30 RX ADMIN — BACLOFEN 10 MG: 10 TABLET ORAL at 12:16

## 2019-07-30 RX ADMIN — TRAZODONE HYDROCHLORIDE 50 MG: 50 TABLET ORAL at 22:23

## 2019-07-30 RX ADMIN — FLECAINIDE ACETATE 25 MG: 50 TABLET ORAL at 04:27

## 2019-07-30 RX ADMIN — METOPROLOL TARTRATE 25 MG: 25 TABLET, FILM COATED ORAL at 18:42

## 2019-07-30 RX ADMIN — BACLOFEN 10 MG: 10 TABLET ORAL at 18:44

## 2019-07-30 RX ADMIN — TRAZODONE HYDROCHLORIDE 50 MG: 50 TABLET ORAL at 23:54

## 2019-07-30 RX ADMIN — Medication 1 CAPSULE: at 05:37

## 2019-07-30 RX ADMIN — MULTIPLE VITAMINS W/ MINERALS TAB 1 TABLET: TAB at 04:23

## 2019-07-30 RX ADMIN — AMPICILLIN SODIUM AND SULBACTAM SODIUM 3 G: 2; 1 INJECTION, POWDER, FOR SOLUTION INTRAMUSCULAR; INTRAVENOUS at 23:53

## 2019-07-30 RX ADMIN — AMPICILLIN SODIUM AND SULBACTAM SODIUM 3 G: 2; 1 INJECTION, POWDER, FOR SOLUTION INTRAMUSCULAR; INTRAVENOUS at 12:16

## 2019-07-30 RX ADMIN — FINASTERIDE 5 MG: 5 TABLET, FILM COATED ORAL at 04:23

## 2019-07-30 RX ADMIN — CELECOXIB 200 MG: 200 CAPSULE ORAL at 04:23

## 2019-07-30 RX ADMIN — VANCOMYCIN HYDROCHLORIDE 1300 MG: 500 INJECTION, POWDER, LYOPHILIZED, FOR SOLUTION INTRAVENOUS at 21:03

## 2019-07-30 RX ADMIN — AMPICILLIN SODIUM AND SULBACTAM SODIUM 3 G: 2; 1 INJECTION, POWDER, FOR SOLUTION INTRAMUSCULAR; INTRAVENOUS at 18:45

## 2019-07-30 RX ADMIN — OXYCODONE HYDROCHLORIDE AND ACETAMINOPHEN 1000 MG: 500 TABLET ORAL at 04:23

## 2019-07-30 RX ADMIN — BACLOFEN 10 MG: 10 TABLET ORAL at 10:43

## 2019-07-30 RX ADMIN — AMPICILLIN SODIUM AND SULBACTAM SODIUM 3 G: 2; 1 INJECTION, POWDER, FOR SOLUTION INTRAMUSCULAR; INTRAVENOUS at 05:37

## 2019-07-30 RX ADMIN — FERROUS SULFATE TAB 325 MG (65 MG ELEMENTAL FE) 325 MG: 325 (65 FE) TAB at 18:44

## 2019-07-30 RX ADMIN — VANCOMYCIN HYDROCHLORIDE 1300 MG: 500 INJECTION, POWDER, LYOPHILIZED, FOR SOLUTION INTRAVENOUS at 09:21

## 2019-07-30 RX ADMIN — METOPROLOL TARTRATE 25 MG: 25 TABLET, FILM COATED ORAL at 04:23

## 2019-07-30 ASSESSMENT — ENCOUNTER SYMPTOMS
ABDOMINAL PAIN: 0
CHILLS: 0
SENSORY CHANGE: 1
FEVER: 0
HEADACHES: 0
SORE THROAT: 0
CONSTIPATION: 0
DIAPHORESIS: 0
DIARRHEA: 0
MYALGIAS: 0
SHORTNESS OF BREATH: 0
FOCAL WEAKNESS: 1
FLANK PAIN: 0
NERVOUS/ANXIOUS: 0
BACK PAIN: 0
VOMITING: 0
NAUSEA: 0

## 2019-07-30 NOTE — PROGRESS NOTES
2 RN skin check complete with ZAIRA Matias.  LUQ ostomy, stoma WNL, appliance is CDI.  2 lap sites with dermabond - no drainage.   Wound to sacrum - wound vac in place.  Preventative mepilex used to protect skin from wound vac tubing.  Cazares catheter is in place.  Left calf wound - mepilex is CDI.  Interventions in place: Q2 turns, heel boots, and low air loss mattress.

## 2019-07-30 NOTE — PROGRESS NOTES
Called wound team to prioritize wound vac change for this patient. Dr. Berumen (plastics) came in to assess wound and removed wound vac. Dry gauze was placed in wound. ZAIRA Carrillo confirmed priority care for patient.

## 2019-07-30 NOTE — PROGRESS NOTES
"Pharmacy Kinetics 69 y.o. male on vancomycin day # 4 2019    Currently on Vancomycin 1300 mg iv q12hr    Indication for Treatment: sacral decubitis, OM    Pertinent history per medical record: Admitted on 2019 for Infected decubitus ulcer, found to have osteomyelitis. 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 ID consulted and duration TBD. Colostomy created .    Other antibiotics: Unasyn    Allergies: Sulfa drugs     List concerns for renal function: low albumin, concurrent losartan, age, paraplegia    Pertinent cultures to date:   19: Coccyx wound cx - Strep constellatus, MRSA (Vanco ROSE = 1), Bacteroides fragilis    BCx 2/2 ngtd    MRSA nares swab if pneumonia is a concern (ordered/positive/negative/n-a): n/a    Recent Labs      19   0337   WBC  7.1     Recent Labs      19   0337   BUN  8   CREATININE  0.51     Recent Labs      19   1151   VANCORANDOM  8.5     Intake/Output Summary (Last 24 hours) at 19 1019  Last data filed at 19 0811   Gross per 24 hour   Intake              840 ml   Output             3575 ml   Net            -2735 ml      /99   Pulse 73   Temp 36.7 °C (98.1 °F) (Temporal)   Resp 18   Ht 1.778 m (5' 10\")   Wt 89.7 kg (197 lb 12 oz)   SpO2 99%  Temp (24hrs), Av.8 °C (98.2 °F), Min:36.6 °C (97.9 °F), Max:36.8 °C (98.3 °F)      A/P   1. Vancomycin dose change: not indicated at this time  2. Next vancomycin level:  0830  3. Goal trough: 12 - 16 mcg/mL  4. Comments: Patient clinically stable. No Scr to evaluate today yet, but Scr may be deceiving as patient is paraplegic while UOP is good. Will f/u on clinical status, renal function, and clinician plans to optimize therapy.     Chely Bui, PharmD      "
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
## 2019-07-30 NOTE — DISCHARGE PLANNING
Received Choice form at 0864  Agency/Facility Name: [facility]   Referral sent per Choice form @ 5050

## 2019-07-30 NOTE — PROGRESS NOTES
Infectious Disease Progress Note    Author: KATE Rivera Date & Time of service: 2019  1:07 PM    Chief Complaint:  Sacral decubitus  Sacral osteomyelitis     Interval History:  69 y.o. WM with  C5- 7 complete quadriplegia admitted 2019 from [facility] on 2019 for wound check on his coccyx. Large decub to bone   AF WBC 5.9  seen with wound care-no surrounding cellulitis. Planned for CT and biopsy today   AF n IR declined to biopsy sacral bone. No fever or chills-had colostomy done this am   afebrile WBC 7.1 patient transferred to [facility] after colostomy creation yesterday.  He denies any abdominal pain.  Tolerating IV antibiotics.  - AF, WBC 5.8, no issue with antibiotics, denies any pain, reports plastic surgeon wanting to hold off on flap at this time.    Labs, medications and wound reviewed.    Review of Systems:  Review of Systems   Constitutional: Negative for chills, diaphoresis, fever and malaise/fatigue.   HENT: Negative for sore throat.    Respiratory: Negative for shortness of breath.    Cardiovascular: Negative for chest pain.   Gastrointestinal: Negative for abdominal pain, constipation, diarrhea, nausea and vomiting.   Genitourinary: Negative for flank pain and hematuria.   Musculoskeletal: Negative for back pain, joint pain and myalgias.   Skin: Negative for rash.   Neurological: Positive for sensory change. Negative for headaches.   Psychiatric/Behavioral: The patient is not nervous/anxious.        Hemodynamics:  Temp (24hrs), Av.8 °C (98.2 °F), Min:36.6 °C (97.9 °F), Max:36.8 °C (98.3 °F)  Temperature: 36.7 °C (98.1 °F)  Pulse  Av.3  Min: 56  Max: 85   Blood Pressure : 153/99       Physical Exam:  Physical Exam   Constitutional: He is oriented to person, place, and time. He appears well-developed and well-nourished. No distress.   HENT:   Head: Normocephalic and atraumatic.   Mouth/Throat: Oropharynx is clear and moist. No  oropharyngeal exudate.   Eyes: Pupils are equal, round, and reactive to light. Conjunctivae and EOM are normal. No scleral icterus.   Neck: Normal range of motion. Neck supple. No tracheal deviation present.   Cardiovascular: Normal rate, regular rhythm, normal heart sounds and intact distal pulses.    No murmur heard.  Pulmonary/Chest: Effort normal and breath sounds normal. No respiratory distress. He has no wheezes. He has no rales.   Abdominal: Soft. Bowel sounds are normal. He exhibits no distension. There is no tenderness.   Colostomy-soft brown stool present in bag.   Genitourinary:   Genitourinary Comments: Cazares-clear yellow urine.   Musculoskeletal: He exhibits no edema.   Sacrum-wound bed pink/yellow with trace yellow/white eschar, no erythema to surrounding tissue.   Neurological: He is alert and oriented to person, place, and time. No cranial nerve deficit. He exhibits abnormal muscle tone. Coordination abnormal.   Quadriplegic   Skin: Skin is warm and dry. No rash noted. No erythema.   Psychiatric: He has a normal mood and affect. Thought content normal.   Nursing note and vitals reviewed.      Meds:    Current Facility-Administered Medications:   •  ampicillin-sulbactam (UNASYN) IV  •  vancomycin  •  ferrous sulfate  •  MD Alert...Vancomycin per Pharmacy  •  traZODone  •  ascorbic acid  •  baclofen  •  celecoxib  •  finasteride  •  flecainide  •  losartan  •  metoprolol  •  therapeutic multivitamin-minerals  •  acetaminophen  •  Notify provider if pain remains uncontrolled **AND** Use the numeric rating scale (NRS-11) on regular floors and Critical-Care Pain Observation Tool (CPOT) on ICUs/Trauma to assess pain **AND** Pulse Ox (Oximetry) **AND** Pharmacy Consult Request **AND** If patient difficult to arouse and/or has respiratory depression, stop any opiates that are currently infusing and call a Rapid Response. **AND** oxyCODONE immediate-release **AND** oxyCODONE immediate-release **AND**  "HYDROmorphone  •  ondansetron  •  ondansetron  •  lactobacillus rhamnosus    Labs:  Recent Labs      07/28/19   0337  07/30/19   1105   WBC  7.1  5.8   RBC  3.39*  3.55*   HEMOGLOBIN  8.4*  9.1*   HEMATOCRIT  28.5*  30.1*   MCV  84.1  84.8   MCH  24.8*  25.6*   RDW  54.1*  55.5*   PLATELETCT  313  291   MPV  8.0*  8.1*   NEUTSPOLYS   --   70.80   LYMPHOCYTES   --   19.70*   MONOCYTES   --   5.90   EOSINOPHILS   --   2.80   BASOPHILS   --   0.30     Recent Labs      07/28/19   0337  07/30/19   1105   SODIUM  141  143   POTASSIUM  3.9  3.9   CHLORIDE  109  110   CO2  25  26   GLUCOSE  92  96   BUN  8  7*     Recent Labs      07/28/19   0337 07/30/19   1105   CREATININE  0.51  0.42*       Imaging:  No new imaging within the last 24 hours.      Micro:  Results     Procedure Component Value Units Date/Time    BLOOD CULTURE [[id number]] Collected:  07/24/19 1201    Order Status:  Completed Specimen:  Blood from Peripheral Updated:  07/29/19 1300     Significant Indicator NEG     Source BLD     Site PERIPHERAL     Culture Result No growth after 5 days of incubation.    Narrative:       Per Hospital Policy: Only change Specimen Src: to \"Line\" if  specified by physician order.  No site indicated    BLOOD CULTURE [[id number]] Collected:  07/24/19 1215    Order Status:  Completed Specimen:  Blood from Peripheral Updated:  07/29/19 1300     Significant Indicator NEG     Source BLD     Site PERIPHERAL     Culture Result No growth after 5 days of incubation.    Narrative:       Per Hospital Policy: Only change Specimen Src: to \"Line\" if  specified by physician order.  No site indicated    CULTURE WOUND W/ GRAM STAIN [[id number]]  (Abnormal)  (Susceptibility) Collected:  07/24/19 1315    Order Status:  Completed Specimen:  Wound from Buttock Updated:  07/27/19 0911     Significant Indicator POS (POS)     Source WND     Site COCCYX     Culture Result Rare growth mixed skin ade. (A)     Gram Stain Result Many WBCs.  Many Gram " positive cocci.  Moderate Gram negative rods.  Few Gram positive rods.       Culture Result Streptococcus constellatus/milleri  Light growth   (A)      Methicillin Resistant Staphylococcus aureus  Rare growth   (A)      Bacteroides fragilis  Moderate growth   (A)    Narrative:       CALL  [facility]  61 tel. [phone],  CALLED  61 tel. [phone] 07/26/2019, 08:26, RB PERF. RESULTS CALLED TO:[id number]  Swab received    Culture & Susceptibility     METHICILLIN RESISTANT STAPHYLOCOCCUS AUREUS     Antibiotic Sensitivity Microscan Unit Status    Ampicillin/sulbactam Resistant <=8/4 mcg/mL Final    Method: ROSE    Clindamycin Resistant >4 mcg/mL Final    Method: ROSE    Daptomycin Sensitive <=0.5 mcg/mL Final    Method: ROSE    Erythromycin Resistant >4 mcg/mL Final    Method: ROSE    Moxifloxacin Resistant >4 mcg/mL Final    Method: ROSE    Oxacillin Resistant >2 mcg/mL Final    Method: ROSE    Penicillin Resistant >8 mcg/mL Final    Method: ROSE    Tetracycline Sensitive <=4 mcg/mL Final    Method: ROSE    Trimeth/Sulfa Sensitive <=0.5/9.5 mcg/mL Final    Method: ROSE    Vancomycin Sensitive 1 mcg/mL Final    Method: ROSE                       URINE CULTURE(NEW) [[id number]] Collected:  07/24/19 1158    Order Status:  Completed Specimen:  Urine Updated:  07/26/19 0819     Significant Indicator NEG     Source UR     Site -     Culture Result No growth at 48 hours.    Narrative:       Indication for culture:->Emergency Room Patient  Indication for culture:->Emergency Room Patient    GRAM STAIN [[id number]] Collected:  07/24/19 1315    Order Status:  Completed Specimen:  Wound Updated:  07/24/19 1635     Significant Indicator .     Source WND     Site COCCYX     Gram Stain Result Many WBCs.  Many Gram positive cocci.  Moderate Gram negative rods.  Few Gram positive rods.      Narrative:       Swab received    URINALYSIS [[id number]] Collected:  07/24/19 1158    Order Status:  Completed Specimen:  Urine Updated:  07/24/19 1216     Color Yellow      Character Clear     Specific Gravity 1.015     Ph 6.5     Glucose Negative mg/dL      Ketones Negative mg/dL      Protein Negative mg/dL      Bilirubin Negative     Urobilinogen, Urine 0.2     Nitrite Negative     Leukocyte Esterase Negative     Occult Blood Negative     Micro Urine Req see below     Comment: Microscopic examination not performed when specimen is clear  and chemically negative for protein, blood, leukocyte esterase  and nitrite.         Narrative:       Indication for culture:->Emergency Room Patient          Assessment:  Active Hospital Problems    Diagnosis   • *Osteomyelitis of coccyx ([facility]) [M86.9]   • S/P colostomy ([facility]) [Z93.3]   • Anemia [D64.9]   • Pressure injury of sacral region, stage 4 ([facility]) [L89.154]   • Paroxysmal atrial flutter ([facility]) [I48.92]   • Essential hypertension [I10]   • Paraplegia ([facility]) [G82.20]   • Neurogenic bladder [N31.9]     Interval 24 hour assessment:    AF, O2 RA,    Labs reviewed, vanco trough 2.9  Imaging personally reviewed both images and report.   Micro reviewed    Pt continued on vancomycin, zosyn transitioned to unasyn. Pt with no complaints, awaiting surgical consults.     Plan:  Sacral decubitis, Stage IV with underlying sacral osteomyelitis  Fluid collection, concern for abscess base of penis on 7/26 CT  Wound culture polymicrobial (MRSA, Bfrag, Strep Constellatus/Milleri)  Blood cxs on 7/24 negative  CT on 7/26 w/ 3.8 cm deep decub ulcer extending to bone, presacral edema, 2 cm fluid collection at base of penis may be abscess  Refused MRI-OSH   XRAY + OM by report  s/p diverting colostomy on 7/27 by Dr. Hannah  IR unable to do a bone biopsy due to extent of wound  Continue IV Vancomycin and Unasyn 3 g every 6 hours  Monitor renal function and Vanco troughs  Duration of antibiotics to be determined based upon plastic surgery planning for flap in near future  Will roughly plan to treat for 6 weeks if flap is planned; however, if not then will give him a  shorter course.     Cervical quad  Impediment to offloading and healing       Discussed case with wound care and bedside RNs-patient was evaluated by Dr. Mott this morning.  No additional debridement recommended.  Unclear about plan for flap as she wants to reevaluate the patient again in approximately 1 month.

## 2019-07-30 NOTE — PROGRESS NOTES
Update received from NOC shift RN.   A/O X 4.   Room air.   VSS.     Labs reviewed.     Plastics, ID and hospitalist assessed pt this am. All agreed that wound appears in good condition and bone biopsy may not be neccessary. Hospilatist to put in LTAC order for patient. Pt will require wound vac and abx management.     Wound team on site to reapply wound vac with irrigation.     LLQ ostomy continues to produce stool.   Indwelling hutchins continues for neurogenic bladder.  Pt continues on l7neaek.   NHW on left calf to be changed today. q48hr changes.    22g right FA PIV running TKO and vanco.      Call light at bedside. No additional needs.

## 2019-07-30 NOTE — DISCHARGE PLANNING
Anticipated Discharge Disposition: [facility]    Action: This RN CM spoke with pt at bedside.  Pt prefers [facility] since his daughter lives close by and he just recently moved from [place].    Barriers to Discharge: [facility] acceptance    Plan: Faxed choice form to JOYCE Sheets.

## 2019-07-30 NOTE — WOUND TEAM
"[facility]  Inpatient Services  Initial Wound and Skin Care Evaluation    Admission Date:  7/24/2019   HPI, PMH, SH: Reviewed  Unit where seen by Wound Team: T412-1    WOUND CONSULT RELATED TO:  Sacral wound VAC dressing change    SUBJECTIVE:  \"whatever you need to do.    Self Report / Pain Level:  0/10     OBJECTIVE:  Pt in bed, able to roll, gauze dressing in place after Dr. Berumen removed VAC to assess wound.      WOUND TYPE, LOCATION, CHARACTERISTICS (Pressure ulcers: location, stage, POA or date identified)    Pressure Injury 07/24/19 sacrum stage 4 POA (Active)   Pressure Injury Stage 4    State of Healing Tunneling;Undermining    Site Assessment Red;Pink    Raeann-wound Assessment Intact    Margins Attached edges    Wound Length (cm) 8 cm    Wound Width (cm) 7 cm    Wound Depth (cm) 5 cm    Wound Surface Area (cm^2) 56 cm^2    Undermining 7 cm    Closure Secondary intention    Drainage Amount Small    Drainage Description Serosanguineous    Treatments Cleansed;Site care    Cleansing Approved Wound Cleanser    Periwound Protectant Benzoin;Drape    Dressing Options Wound Vac    Dressing Cleansing/Solutions Normal Saline    Dressing Changed Changed    Dressing Status Clean;Dry;Intact    Dressing Change Frequency Tuesday, Thursday, Saturday    NEXT Dressing Change  08/01/19    NEXT Weekly Photo (Inpatient Only) 08/03/19    WOUND NURSE ONLY - Odor None    WOUND NURSE ONLY - Exposed Structures Bone    WOUND NURSE ONLY - Tissue Type and Percentage 90% red/pink, 10% brown/yellow    WOUND NURSE ONLY - Time Spent with Patient (mins) 60          Negative Pressure Wound Therapy Sacrum (Active)   NPWT Pump Mode / Pressure Setting 125 mmHg;Continuous    Dressing Type Medium;Black foam    Number of Foam Pieces Used 2    Canister Changed Yes    Output (mL) 500 mL    VAC VeraFlo Irrigant Normal Saline    VAC VeraFlo Soak Time (mins) 10    VAC VeraFlo Instill Volume (ml) 33    VAC VeraFlo - Therapy Time (hrs) " 3.5    VAC VeraFlo Pressure (mm/Hg) Continuous;125 mmHg    WOUND NURSE ONLY - Time Spent with Patient (mins)        Vascular: nt      Lab Values:    WBC:       WBC   Date/Time Value Ref Range Status   07/30/2019 11:05 AM 5.8 4.8 - 10.8 K/uL Final     AIC:    No results found for: HBA1C      Culture:   Completed nt    INTERVENTIONS BY WOUND TEAM:  Dressings removed, sacral wound cleansed with wound cleanser, benzoin, drape and paste ring to shira-wound.  Vera neftali sponge placed to fill wound, with button and track pad.  Mepilex x 2 under tubing and tubing secured to mepilex.        Interdisciplinary consultation:  RN, patient, Sherri ID APRN    EVALUATION:  Patient seen by Dr. Berumen this morning who does not recommend any further debridement.  Would like patient to follow up in one month.  Bone biopsy was not able to be done due to inadequate specimen.  Continue vera neftali which seems to be improving quality of wound.        Factors affecting wound healing:  Immobility, bone exposed in wound bed  Goals:  Steady decrease in wound area and depth weekly     NURSING PLAN OF CARE ORDERS (X):    Dressing changes: See Dressing Care orders:   X  Skin care: See Skin Care orders:   Rectal tube care: See Rectal Tube Care orders:   Other orders:      WOUND TEAM PLAN OF CARE (X):   NPWT change 3 x week:      X   Dressing changes by wound team:       Follow up as needed:   X for left calf wound  Other (explain):    Anticipated discharge plans (X):  SNF:        X pt will need VAC vera neftali wound changes for sacral wound upon discharge   Home Care:           Outpatient Wound Center:            Self Care:            Other:

## 2019-07-31 ENCOUNTER — HOSPITAL ENCOUNTER (OUTPATIENT)
Facility: MEDICAL CENTER | Age: 69
End: 2019-08-21
Attending: HOSPITALIST | Admitting: HOSPITALIST
Payer: MEDICARE

## 2019-07-31 ENCOUNTER — APPOINTMENT (OUTPATIENT)
Dept: RADIOLOGY | Facility: MEDICAL CENTER | Age: 69
DRG: 981 | End: 2019-07-31
Attending: NURSE PRACTITIONER
Payer: MEDICARE

## 2019-07-31 VITALS
OXYGEN SATURATION: 95 % | SYSTOLIC BLOOD PRESSURE: 141 MMHG | WEIGHT: 197.75 LBS | TEMPERATURE: 97.8 F | HEART RATE: 53 BPM | HEIGHT: 70 IN | DIASTOLIC BLOOD PRESSURE: 87 MMHG | RESPIRATION RATE: 16 BRPM | BODY MASS INDEX: 28.31 KG/M2

## 2019-07-31 LAB
ANION GAP SERPL CALC-SCNC: 8 MMOL/L (ref 0–11.9)
ANISOCYTOSIS BLD QL SMEAR: ABNORMAL
BASOPHILS # BLD AUTO: 0.3 % (ref 0–1.8)
BASOPHILS # BLD: 0.02 K/UL (ref 0–0.12)
BUN SERPL-MCNC: 8 MG/DL (ref 8–22)
CALCIUM SERPL-MCNC: 8.6 MG/DL (ref 8.5–10.5)
CHLORIDE SERPL-SCNC: 114 MMOL/L (ref 96–112)
CO2 SERPL-SCNC: 25 MMOL/L (ref 20–33)
COMMENT 1642: NORMAL
CREAT SERPL-MCNC: 0.49 MG/DL (ref 0.5–1.4)
EOSINOPHIL # BLD AUTO: 0.24 K/UL (ref 0–0.51)
EOSINOPHIL NFR BLD: 4.1 % (ref 0–6.9)
ERYTHROCYTE [DISTWIDTH] IN BLOOD BY AUTOMATED COUNT: 56 FL (ref 35.9–50)
GLUCOSE SERPL-MCNC: 90 MG/DL (ref 65–99)
HCT VFR BLD AUTO: 30.6 % (ref 42–52)
HGB BLD-MCNC: 8.7 G/DL (ref 14–18)
HYPOCHROMIA BLD QL SMEAR: ABNORMAL
IMM GRANULOCYTES # BLD AUTO: 0.04 K/UL (ref 0–0.11)
IMM GRANULOCYTES NFR BLD AUTO: 0.7 % (ref 0–0.9)
LYMPHOCYTES # BLD AUTO: 1.71 K/UL (ref 1–4.8)
LYMPHOCYTES NFR BLD: 29.2 % (ref 22–41)
MCH RBC QN AUTO: 24.3 PG (ref 27–33)
MCHC RBC AUTO-ENTMCNC: 28.4 G/DL (ref 33.7–35.3)
MCV RBC AUTO: 85.5 FL (ref 81.4–97.8)
MICROCYTES BLD QL SMEAR: ABNORMAL
MONOCYTES # BLD AUTO: 0.45 K/UL (ref 0–0.85)
MONOCYTES NFR BLD AUTO: 7.7 % (ref 0–13.4)
MORPHOLOGY BLD-IMP: NORMAL
NEUTROPHILS # BLD AUTO: 3.39 K/UL (ref 1.82–7.42)
NEUTROPHILS NFR BLD: 58 % (ref 44–72)
NRBC # BLD AUTO: 0 K/UL
NRBC BLD-RTO: 0 /100 WBC
OVALOCYTES BLD QL SMEAR: NORMAL
PLATELET # BLD AUTO: 321 K/UL (ref 164–446)
PLATELET BLD QL SMEAR: NORMAL
PMV BLD AUTO: 8.1 FL (ref 9–12.9)
POIKILOCYTOSIS BLD QL SMEAR: NORMAL
POTASSIUM SERPL-SCNC: 3.8 MMOL/L (ref 3.6–5.5)
RBC # BLD AUTO: 3.58 M/UL (ref 4.7–6.1)
RBC BLD AUTO: PRESENT
SODIUM SERPL-SCNC: 147 MMOL/L (ref 135–145)
VANCOMYCIN TROUGH SERPL-MCNC: 13.4 UG/ML (ref 10–20)
WBC # BLD AUTO: 5.9 K/UL (ref 4.8–10.8)

## 2019-07-31 PROCEDURE — 80202 ASSAY OF VANCOMYCIN: CPT

## 2019-07-31 PROCEDURE — 36415 COLL VENOUS BLD VENIPUNCTURE: CPT

## 2019-07-31 PROCEDURE — 700111 HCHG RX REV CODE 636 W/ 250 OVERRIDE (IP): Performed by: INTERNAL MEDICINE

## 2019-07-31 PROCEDURE — 700111 HCHG RX REV CODE 636 W/ 250 OVERRIDE (IP): Performed by: HOSPITALIST

## 2019-07-31 PROCEDURE — 99239 HOSP IP/OBS DSCHRG MGMT >30: CPT | Performed by: INTERNAL MEDICINE

## 2019-07-31 PROCEDURE — 700102 HCHG RX REV CODE 250 W/ 637 OVERRIDE(OP): Performed by: HOSPITALIST

## 2019-07-31 PROCEDURE — A9270 NON-COVERED ITEM OR SERVICE: HCPCS | Performed by: HOSPITALIST

## 2019-07-31 PROCEDURE — 700105 HCHG RX REV CODE 258: Performed by: INTERNAL MEDICINE

## 2019-07-31 PROCEDURE — 700105 HCHG RX REV CODE 258: Performed by: HOSPITALIST

## 2019-07-31 PROCEDURE — 85025 COMPLETE CBC W/AUTO DIFF WBC: CPT

## 2019-07-31 PROCEDURE — 80048 BASIC METABOLIC PNL TOTAL CA: CPT

## 2019-07-31 PROCEDURE — 99233 SBSQ HOSP IP/OBS HIGH 50: CPT | Performed by: INTERNAL MEDICINE

## 2019-07-31 PROCEDURE — A9270 NON-COVERED ITEM OR SERVICE: HCPCS | Performed by: NURSE PRACTITIONER

## 2019-07-31 PROCEDURE — 700102 HCHG RX REV CODE 250 W/ 637 OVERRIDE(OP): Performed by: NURSE PRACTITIONER

## 2019-07-31 RX ORDER — FERROUS SULFATE 325(65) MG
325 TABLET ORAL 2 TIMES DAILY WITH MEALS
Qty: 30 TAB
Start: 2019-07-31 | End: 2019-12-06

## 2019-07-31 RX ADMIN — BACLOFEN 10 MG: 10 TABLET ORAL at 12:19

## 2019-07-31 RX ADMIN — FLECAINIDE ACETATE 25 MG: 50 TABLET ORAL at 06:30

## 2019-07-31 RX ADMIN — VANCOMYCIN HYDROCHLORIDE 1300 MG: 500 INJECTION, POWDER, LYOPHILIZED, FOR SOLUTION INTRAVENOUS at 08:34

## 2019-07-31 RX ADMIN — AMPICILLIN SODIUM AND SULBACTAM SODIUM 3 G: 2; 1 INJECTION, POWDER, FOR SOLUTION INTRAMUSCULAR; INTRAVENOUS at 12:19

## 2019-07-31 RX ADMIN — MULTIPLE VITAMINS W/ MINERALS TAB 1 TABLET: TAB at 06:28

## 2019-07-31 RX ADMIN — METOPROLOL TARTRATE 25 MG: 25 TABLET, FILM COATED ORAL at 06:28

## 2019-07-31 RX ADMIN — BACLOFEN 10 MG: 10 TABLET ORAL at 08:33

## 2019-07-31 RX ADMIN — CELECOXIB 200 MG: 200 CAPSULE ORAL at 06:28

## 2019-07-31 RX ADMIN — FINASTERIDE 5 MG: 5 TABLET, FILM COATED ORAL at 06:28

## 2019-07-31 RX ADMIN — AMPICILLIN SODIUM AND SULBACTAM SODIUM 3 G: 2; 1 INJECTION, POWDER, FOR SOLUTION INTRAMUSCULAR; INTRAVENOUS at 06:32

## 2019-07-31 RX ADMIN — Medication 1 CAPSULE: at 08:33

## 2019-07-31 RX ADMIN — LOSARTAN POTASSIUM 25 MG: 25 TABLET ORAL at 06:29

## 2019-07-31 RX ADMIN — OXYCODONE HYDROCHLORIDE AND ACETAMINOPHEN 1000 MG: 500 TABLET ORAL at 06:28

## 2019-07-31 RX ADMIN — FERROUS SULFATE TAB 325 MG (65 MG ELEMENTAL FE) 325 MG: 325 (65 FE) TAB at 08:33

## 2019-07-31 ASSESSMENT — COGNITIVE AND FUNCTIONAL STATUS - GENERAL
SUGGESTED CMS G CODE MODIFIER DAILY ACTIVITY: CL
TURNING FROM BACK TO SIDE WHILE IN FLAT BAD: A LOT
TOILETING: TOTAL
DAILY ACTIVITIY SCORE: 10
MOVING TO AND FROM BED TO CHAIR: UNABLE
PERSONAL GROOMING: A LOT
STANDING UP FROM CHAIR USING ARMS: TOTAL
HELP NEEDED FOR BATHING: TOTAL
SUGGESTED CMS G CODE MODIFIER MOBILITY: CM
DRESSING REGULAR UPPER BODY CLOTHING: A LOT
DRESSING REGULAR LOWER BODY CLOTHING: TOTAL
MOVING FROM LYING ON BACK TO SITTING ON SIDE OF FLAT BED: UNABLE
CLIMB 3 TO 5 STEPS WITH RAILING: TOTAL
WALKING IN HOSPITAL ROOM: TOTAL
MOBILITY SCORE: 7
EATING MEALS: A LITTLE

## 2019-07-31 ASSESSMENT — LIFESTYLE VARIABLES
AVERAGE NUMBER OF DAYS PER WEEK YOU HAVE A DRINK CONTAINING ALCOHOL: 0
EVER FELT BAD OR GUILTY ABOUT YOUR DRINKING: NO
ALCOHOL_USE: NO
EVER HAD A DRINK FIRST THING IN THE MORNING TO STEADY YOUR NERVES TO GET RID OF A HANGOVER: NO
CONSUMPTION TOTAL: NEGATIVE
HAVE PEOPLE ANNOYED YOU BY CRITICIZING YOUR DRINKING: NO
TOTAL SCORE: 0
ON A TYPICAL DAY WHEN YOU DRINK ALCOHOL HOW MANY DRINKS DO YOU HAVE: 0
HAVE YOU EVER FELT YOU SHOULD CUT DOWN ON YOUR DRINKING: NO
TOTAL SCORE: 0
TOTAL SCORE: 0
HOW MANY TIMES IN THE PAST YEAR HAVE YOU HAD 5 OR MORE DRINKS IN A DAY: 0

## 2019-07-31 ASSESSMENT — ENCOUNTER SYMPTOMS
CHILLS: 0
NERVOUS/ANXIOUS: 0
HEADACHES: 0
ABDOMINAL PAIN: 0
VOMITING: 0
NAUSEA: 0
FOCAL WEAKNESS: 1
DIARRHEA: 0
SENSORY CHANGE: 1
MYALGIAS: 0
COUGH: 0
FEVER: 0
CONSTIPATION: 0
SHORTNESS OF BREATH: 0

## 2019-07-31 NOTE — WOUND TEAM
"[facility]  Inpatient Services  New Ostomy Management & Teaching    HPI:  Reviewed  PMH: Reviewed   SH: Reviewed    Subjective: \"I didn't have to empty the bag on the last one I had, I took the whole thing off and threw it away\"    Objective:   appliance intact     Ostomy type: loop colostomy   Stoma location: LLQ   Stoma assessment:    Appearance: red, oval    Size:1.8\"    Protrusion: well budded    Output: small formed and watery brown    MC jxn intact    Peristomal skin:intact     Ostomy Appliance (type and size): two piece 2 3/4\" with paste ring    Interventions:  Patient traced and cut barrier with minimal assist.  He removed old appliance and I assisted cleaning distal peristomal skin and he cleansed proximal.  I assisted with placement of barrier to check fit and then patient applied paste ring to barrier opening.  I assisted with barrier placement and patient completed with rubbing to adhere and snapping on pouch and closing end of pouch.  Catalog marked and instructed patient on how to order supplies.  He reports possibly discharging to [facility] and then return to [facility] where is was prior to this admission.       Pt education: Questions and concerns addressed; see above    Evaluation: pt has new diverting colostomy with output. He was able to perform care after having it demonstrated.  He is becoming more independent with self care and aware of importance of doing care himself and not allowing staff to do it all    Plan: Ostomy nurses to continue to follow for ostomy needs and teaching     Anticipated discharge needs:  See above; will need some DC supplies   "

## 2019-07-31 NOTE — PROGRESS NOTES
Report called in to [facility] ZAIRA Hernandez.   Pt to leave here with 22g PIV on right FA.   [facility] will place PICC at their location.   [facility] to  at 1500.

## 2019-07-31 NOTE — PROGRESS NOTES
Anticipated Discharge Disposition: [facility]    Action: Pt has been medically cleared by ID and Dr. Coreas for transport to [facility]. Pt is agreeable to transfer.    Barriers to Discharge: none    Plan: COBRA filled out and transfer packet completed.
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
## 2019-07-31 NOTE — CARE PLAN
Problem: Communication  Goal: The ability to communicate needs accurately and effectively will improve  Outcome: PROGRESSING AS EXPECTED  Discussed plan of care with patient. Patient has no questions at this time. Patient needs met at this time.     Problem: Safety  Goal: Will remain free from injury  Outcome: PROGRESSING AS EXPECTED  Patient calls appropriately. Patient not able to get out of bed. All of patient's belongings and call light are in reach. Bed locked and in lowest position.

## 2019-07-31 NOTE — PROGRESS NOTES
Received report from day shift nurse.   Assumed care of patient.   Patient A&O x   4   Patient denies  SOB or Chest pain.   Patient's respirations are even and unlabored   Patient diet regular  , LLQ ostomy putting out adequate output , Bowel sounds normoactive x 4 quadrants.   Patient denies pain.    Patient is a paraplegic. Patient stated that he cannot feel anything below the nipple line. Patient is  Q 2 hour turns. Patient on a low air loss mattress, heels floated with boots, pillows used for repositioning.     Pulses are a 2 + x 4 extremities     Discussed POC with patient and answered any questions that the patient had.   Reinforced use of call light. Bed locked and in lowest position. Belongings and call light in reach. Hourly rounding in place to check on patient's well being.

## 2019-07-31 NOTE — DISCHARGE PLANNING
Received Transport Form @ 1410  Spoke to Charlie @ [facility]    Transport is scheduled for 7/31 @1500 going to [facility].    ZAIRA Daley notified.

## 2019-07-31 NOTE — PROGRESS NOTES
"Update received from NOC shift RN.   A/O X 4.   Room air.   Pt bradycardic overnight.   /87   Pulse (!) 53   Temp 36.6 °C (97.8 °F) (Temporal)   Resp 16   Ht 1.778 m (5' 10\")   Wt 89.7 kg (197 lb 12 oz)   SpO2 95%   BMI 28.37 kg/m²     Labs reviewed.   Hgb down to 8.7 from 9.1.   Na at 147.   Creatinine at 0.49.     Pt denies any pain.   Pt turned to his left. [id number].     LLQ ostomy continues to produce firm stool.   Cazares continues in place for neurogenic bladder.   Irrigation wound vac continues in place on sacrum. Cannister is filled to 250mL of serous output.     Heel float boots in place.   NHW on left calf dressing in place. Due for change 8/1/2019.     22g on left FA continues to infuse TKO and vancomycin.   Discussed possibility of midline or PICC line placement with Dr. Coreas for extended ABX administration. Dr. Coreas awaiting feedback from KATH HENNING.    Call light at bedside. No additional needs.      "

## 2019-07-31 NOTE — PROGRESS NOTES
Discharge and follow up instructions discussed with patient.   Completed COBRA form handed to [facility].   Pt disconnected from irrigation wound vac.   22g PIV on right FA left in place and clamped per TPH request.   Pt transferred to [facility] for transport to [facility] ambulance.

## 2019-07-31 NOTE — PROGRESS NOTES
Infectious Disease Progress Note    Author: KATE Rivera Date & Time of service: 2019  12:50 PM    Chief Complaint:  Sacral decubitus  Sacral osteomyelitis     Interval History:  69 y.o. WM with  C5- 7 complete quadriplegia admitted 2019 from [facility] on 2019 for wound check on his coccyx. Large decub to bone   AF WBC 5.9  seen with wound care-no surrounding cellulitis. Planned for CT and biopsy today   AF n IR declined to biopsy sacral bone. No fever or chills-had colostomy done this am   afebrile WBC 7.1 patient transferred to [facility] after colostomy creation yesterday.  He denies any abdominal pain.  Tolerating IV antibiotics.  - AF, WBC 5.8, no issue with antibiotics, denies any pain, reports plastic surgeon wanting to hold off on flap at this time.  -AF, WBC 5.9, tolerating antibiotics, WV in place to sacrum, denies any pain, agreeable to PICC placement for need of IV antibiotics x6 weeks.    Labs and medications reviewed.    Review of Systems:  Review of Systems   Constitutional: Negative for chills, fever and malaise/fatigue.   HENT: Negative for congestion.    Respiratory: Negative for cough and shortness of breath.    Cardiovascular: Negative for chest pain and leg swelling.   Gastrointestinal: Negative for abdominal pain, constipation, diarrhea, nausea and vomiting.   Genitourinary: Negative for hematuria.   Musculoskeletal: Negative for joint pain and myalgias.   Skin: Negative for itching.   Neurological: Positive for sensory change. Negative for headaches.   Psychiatric/Behavioral: The patient is not nervous/anxious.        Hemodynamics:  Temp (24hrs), Av.6 °C (97.8 °F), Min:36.1 °C (97 °F), Max:36.9 °C (98.5 °F)  Temperature: 36.6 °C (97.8 °F)  Pulse  Av.4  Min: 53  Max: 85   Blood Pressure : 141/87       Physical Exam:  Physical Exam   Constitutional: He is oriented to person, place, and time. He appears well-developed  and well-nourished.   HENT:   Mouth/Throat: Oropharynx is clear and moist. No oropharyngeal exudate.   Eyes: Pupils are equal, round, and reactive to light. Conjunctivae and EOM are normal.   Neck: Normal range of motion. Neck supple. No JVD present.   Cardiovascular: Normal rate, regular rhythm, normal heart sounds and intact distal pulses. Exam reveals no gallop and no friction rub.   Pulmonary/Chest: Effort normal and breath sounds normal. No respiratory distress. He has no wheezes. He exhibits no tenderness.   Abdominal: Soft. Bowel sounds are normal. He exhibits no distension. There is no rebound and no guarding.   Colostomy-soft brown stool present in bag.   Genitourinary:   Genitourinary Comments: Cazares-clear yellow urine.   Musculoskeletal: He exhibits no edema or tenderness.   Sacrum-Veraflo WV in place with moderate serous drainage in canister.   Neurological: He is alert and oriented to person, place, and time. No cranial nerve deficit. He exhibits abnormal muscle tone. Coordination abnormal.   Quadriplegic   Skin: Skin is warm and dry. No rash noted. He is not diaphoretic. No pallor.   Psychiatric: He has a normal mood and affect. His behavior is normal.   Nursing note and vitals reviewed.      Meds:    Current Facility-Administered Medications:   •  ampicillin-sulbactam (UNASYN) IV  •  vancomycin  •  ferrous sulfate  •  MD Alert...Vancomycin per Pharmacy  •  traZODone  •  ascorbic acid  •  baclofen  •  celecoxib  •  finasteride  •  flecainide  •  losartan  •  metoprolol  •  therapeutic multivitamin-minerals  •  acetaminophen  •  Notify provider if pain remains uncontrolled **AND** Use the numeric rating scale (NRS-11) on regular floors and Critical-Care Pain Observation Tool (CPOT) on ICUs/Trauma to assess pain **AND** Pulse Ox (Oximetry) **AND** Pharmacy Consult Request **AND** If patient difficult to arouse and/or has respiratory depression, stop any opiates that are currently infusing and call a  "Rapid Response. **AND** oxyCODONE immediate-release **AND** oxyCODONE immediate-release **AND** HYDROmorphone  •  ondansetron  •  ondansetron  •  lactobacillus rhamnosus    Labs:  Recent Labs     07/30/19  1105 07/31/19  0411   WBC 5.8 5.9   RBC 3.55* 3.58*   HEMOGLOBIN 9.1* 8.7*   HEMATOCRIT 30.1* 30.6*   MCV 84.8 85.5   MCH 25.6* 24.3*   RDW 55.5* 56.0*   PLATELETCT 291 321   MPV 8.1* 8.1*   NEUTSPOLYS 70.80 58.00   LYMPHOCYTES 19.70* 29.20   MONOCYTES 5.90 7.70   EOSINOPHILS 2.80 4.10   BASOPHILS 0.30 0.30   RBCMORPHOLO  --  Present     Recent Labs     07/30/19  1105 07/31/19  0411   SODIUM 143 147*   POTASSIUM 3.9 3.8   CHLORIDE 110 114*   CO2 26 25   GLUCOSE 96 90   BUN 7* 8     Recent Labs     07/30/19  1105 07/31/19 0411   CREATININE 0.42* 0.49*       Imaging:  No new imaging within the last 24 hours.      Micro:  Results     Procedure Component Value Units Date/Time    BLOOD CULTURE [[id number]] Collected:  07/24/19 1201    Order Status:  Completed Specimen:  Blood from Peripheral Updated:  07/29/19 1300     Significant Indicator NEG     Source BLD     Site PERIPHERAL     Culture Result No growth after 5 days of incubation.    Narrative:       Per Hospital Policy: Only change Specimen Src: to \"Line\" if  specified by physician order.  No site indicated    BLOOD CULTURE [[id number]] Collected:  07/24/19 1215    Order Status:  Completed Specimen:  Blood from Peripheral Updated:  07/29/19 1300     Significant Indicator NEG     Source BLD     Site PERIPHERAL     Culture Result No growth after 5 days of incubation.    Narrative:       Per Hospital Policy: Only change Specimen Src: to \"Line\" if  specified by physician order.  No site indicated    CULTURE WOUND W/ GRAM STAIN [[id number]]  (Abnormal)  (Susceptibility) Collected:  07/24/19 1315    Order Status:  Completed Specimen:  Wound from Buttock Updated:  07/27/19 0911     Significant Indicator POS     Source WND     Site COCCYX     Culture Result Rare growth " mixed skin ade.     Gram Stain Result Many WBCs.  Many Gram positive cocci.  Moderate Gram negative rods.  Few Gram positive rods.       Culture Result Streptococcus constellatus/milleri  Light growth        Methicillin Resistant Staphylococcus aureus  Rare growth        Bacteroides fragilis  Moderate growth      Narrative:       CALL  [facility]  61 tel. [phone],  CALLED  61 tel. [phone] 07/26/2019, 08:26, RB PERF. RESULTS CALLED TO:[phone]  Swab received    Susceptibility     Methicillin resistant staphylococcus aureus (2)     Antibiotic Interpretation Microscan Method Status    Clindamycin Resistant >4 mcg/mL ROSE Final    Daptomycin Sensitive <=0.5 mcg/mL ROSE Final    Erythromycin Resistant >4 mcg/mL ROSE Final    Moxifloxacin Resistant >4 mcg/mL ORSE Final    Ampicillin/sulbactam Resistant <=8/4 mcg/mL ROSE Final    Oxacillin Resistant >2 mcg/mL ROSE Final    Vancomycin Sensitive 1 mcg/mL ROSE Final    Penicillin Resistant >8 mcg/mL ROSE Final    Trimeth/Sulfa Sensitive <=0.5/9.5 mcg/mL ROSE Final    Tetracycline Sensitive <=4 mcg/mL ROSE Final                   URINE CULTURE(NEW) [[id number]] Collected:  07/24/19 1158    Order Status:  Completed Specimen:  Urine Updated:  07/26/19 0819     Significant Indicator NEG     Source UR     Site -     Culture Result No growth at 48 hours.    Narrative:       Indication for culture:->Emergency Room Patient  Indication for culture:->Emergency Room Patient    GRAM STAIN [[id number]] Collected:  07/24/19 1315    Order Status:  Completed Specimen:  Wound Updated:  07/24/19 1635     Significant Indicator .     Source WND     Site COCCYX     Gram Stain Result Many WBCs.  Many Gram positive cocci.  Moderate Gram negative rods.  Few Gram positive rods.      Narrative:       Swab received          Assessment:  Active Hospital Problems    Diagnosis   • *Osteomyelitis of coccyx ([facility]) [M86.9]   • S/P colostomy ([facility]) [Z93.3]   • Pressure injury of sacral region, stage 4 ([facility]) [L89.154]   •  Paraplegia (HCC) [G82.20]   • Neurogenic bladder [N31.9]     Interval 24 hour assessment:    AF, O2 RA,         Plan:  Sacral decubitis, Stage IV with underlying sacral osteomyelitis  Fluid collection, concern for abscess base of penis on 7/26 CT  Wound culture polymicrobial (MRSA, Bfrag, Strep Constellatus/Milleri)  Blood cxs on 7/24 negative  CT on 7/26 w/ 3.8 cm deep decub ulcer extending to bone, presacral edema, 2 cm fluid collection at base of penis may be abscess  Refused MRI-OSH   XRAY + OM by report  s/p diverting colostomy on 7/27 by Dr. Hannah  IR unable to do a bone biopsy due to extent of wound  Dr. Mott planning on flap placement in roughly 1 month  Continue IV Vancomycin and Unasyn 3 g every 6 hours  Monitor renal function and Vanco troughs  Will treat with IV antibiotics for 6 weeks as there is a plan for flap placement in about a month  Estimated end date 9/4/19  PICC ordered- To be placed at [facility] as patient transferring this afternoon  Okay for patient to be on IV ceftriaxone and p.o. Flagyl while at [facility] due to a shortage of Unasyn.  He will continue on IV vancomycin as well.     Cervical quad  Impediment to offloading and healing       Discussed case with ZAIRA Crespo.  Pending approval at [facility].      ID will continue to follow every 2 to 3 days.

## 2019-07-31 NOTE — DISCHARGE INSTRUCTIONS
Discharge Instructions    Discharged to home by ambulance with escort. Discharged via ambulance, hospital escort: Yes.  Special equipment needed: Not Applicable    Be sure to schedule a follow-up appointment with your primary care doctor or any specialists as instructed.     Discharge Plan:   Diet Plan: Discussed  Activity Level: Discussed  Confirmed Follow up Appointment: Patient to Call and Schedule Appointment  Confirmed Symptoms Management: Discussed  Medication Reconciliation Updated: Yes  Influenza Vaccine Indication: Not indicated: Previously immunized this influenza season and > 8 years of age    I understand that a diet low in cholesterol, fat, and sodium is recommended for good health. Unless I have been given specific instructions below for another diet, I accept this instruction as my diet prescription.   Other diet: Regular    Special Instructions: None    · Is patient discharged on Warfarin / Coumadin?   No       Pressure Injury  A pressure injury, sometimes called a bedsore, is an injury to the skin and underlying tissue caused by pressure. Pressure on blood vessels causes decreased blood flow to the skin, which can eventually cause the skin tissue to die and break down into a wound.  Pressure injuries usually occur:  · Over bony parts of the body such as the tailbone, shoulders, elbows, hips, and heels.  · Under medical devices such as respiratory equipment, stockings, tubes, and splints.  Pressure injuries start as reddened areas on the skin and can lead to pain, muscle damage, and infection. Pressure injuries can vary in severity.  What are the causes?  Pressure injuries are caused by a lack of blood supply to an area of skin. They can occur from intense pressure over a short period of time or from less intense pressure over a long period of time.  What increases the risk?  This condition is more likely to develop in people who:  · Are in the hospital or an extended care facility.  · Are bedridden  or in a wheelchair.  · Have an injury or disease that keeps them from:  ¨ Moving normally.  ¨ Feeling pain or pressure.  · Have a condition that:  ¨ Makes them sleepy or less alert.  ¨ Causes poor blood flow.  · Need to wear a medical device.  · Have poor control of their bladder or bowel functions (incontinence).  · Have poor nutrition (malnutrition).  · Are of certain ethnicities. People of  and  or  descent are at higher risk compared to other ethnic groups.  If you are at risk for pressure ulcers, your health care provider may recommend certain types of bedding to help prevent them. These may include foam or gel mattresses covered with one of the following:  · A sheepskin blanket.  · A pad that is filled with gel, air, water, or foam.  What are the signs or symptoms?  The main symptom is a blister or change in skin color that opens into a wound. Other symptoms include:  · Red or dark areas of skin that do not turn white or pale when pressed with a finger.  · Pain, warmth, or change of skin texture.  How is this diagnosed?  This condition is diagnosed with a medical history and physical exam. You may also have tests, including:  · Blood tests to check for infection or signs of poor nutrition.  · Imaging studies to check for damage to the deep tissues under your skin.  · Blood flow studies.  Your pressure injury will be staged to determine its severity. Staging is an assessment of:  · The depth of the pressure injury.  · Which tissues are exposed because of the pressure injury.  · The causes of the pressure injury.  How is this treated?  The main focus of treatment is to help your injury heal. This may be done by:  · Relieving or redistributing pressure on your skin. This includes:  ¨ Frequently changing your position.  ¨ Eliminating or minimizing positions that caused the wound or that can make the wound worse.  ¨ Using specific bed mattresses and chair cushions.  ¨ Refitting,  resizing, or replacing any medical devices, or padding the skin under them.  ¨ Using creams or powders to prevent rubbing (friction) on the skin.  · Keeping your skin clean and dry. This may include using a skin cleanser or skin protectant as told by your health care provider. This may be a lotion, ointment, or spray.  · Cleaning your injury and removing any dead tissue from the wound (debridement).  · Placing a bandage (dressing) over your injury.  · Preventing or treating infection. This may include antibiotic, antimicrobial, or antiseptic medicines.  Treatment may also include medicine for pain.  Sometimes surgery is needed to close the wound with a flap of healthy skin or a piece of skin from another area of your body (graft). You may need surgery if other treatments are not working or if your injury is very deep.  Follow these instructions at home:  Wound care  · Follow instructions from your health care provider about:  ¨ How to take care of your wound.  ¨ When and how you should change your dressing.  ¨ When you should remove your dressing. If your dressing is dry and stuck when you try to remove it, moisten or wet the dressing with saline or water so that it can be removed without harming your skin or wound tissue.  · Check your wound every day for signs of infection. Have a caregiver do this for you if you are not able. Watch for:  ¨ More redness, swelling, or pain.  ¨ More fluid, blood, or pus.  ¨ A bad smell.  Skin Care  · Keep your skin clean and dry. Gently pat your skin dry.  · Do not rub or massage your skin.  · Use a skin protectant only as told by your health care provider.  · Check your skin every day for any changes in color or any new blisters or sores (ulcers). Have a caregiver do this for you if you are not able.  Medicines  · Take over-the-counter and prescription medicines only as told by your health care provider.  · If you were prescribed an antibiotic medicine, take it or apply it as told  by your health care provider. Do not stop taking or using the antibiotic even if your condition improves.  Reducing and Redistributing Pressure  · Do not lie or sit in one position for a long time. Move or change position every two hours or as told by your health care provider.  · Use pillows or cushions to reduce pressure. Ask your health care provider to recommend cushions or pads for you.  · Use medical devices that do not rub your skin. Tell your health care provider if one of your medical devices is causing a pressure injury to develop.  General instructions  · Eat a healthy diet that includes lots of protein. Ask your health care provider for diet advice.  · Drink enough fluid to keep your urine clear or pale yellow.  · Be as active as you can every day. Ask your health care provider to suggest safe exercises or activities.  · Do not abuse drugs or alcohol.  · Keep all follow-up visits as told by your health care provider. This is important.  · Do not smoke.  Contact a health care provider if:    · You have chills or fever.  · Your pain medicine is not helping.  · You have any changes in skin color.  · You have new blisters or sores.  · You develop warmth, redness, or swelling near a pressure injury.  · You have a bad odor or pus coming from your pressure injury.  · You lose control of your bowels or bladder.  · You develop new symptoms.  · Your wound does not improve after 1-2 weeks of treatment.  · You develop a new medical condition, such as diabetes, peripheral vascular disease, or conditions that affect your defense (immune) system.  This information is not intended to replace advice given to you by your health care provider. Make sure you discuss any questions you have with your health care provider.  Document Released: 12/18/2006 Document Revised: 05/22/2017 Document Reviewed: 04/27/2016  [employer] Interactive Patient Education © 2017 [employer]    Vacuum-Assisted Closure Therapy Home  Guide  Vacuum-assisted closure therapy (VAC therapy) is a device that helps wounds heal. It is used on wounds that cannot be closed with stitches. They often heal slowly. VAC therapy helps the wound stay clean and healthy while its edges slowly grow back together.  VAC therapy uses a bandage (dressing) that is made of foam. It is put inside the wound. Then, a drape is placed over the wound. This drape sticks to your skin (adhesive) to keep air out. A tube is hooked up to a small pump and is attached to the drape. The pump sucks fluid and germs from the wound. It can also decrease any bad smell that comes from the wound.  RISKS AND COMPLICATIONS  VAC therapy is usually safe to use at home. Your skin may get sore from the adhesive drape. That is the most common problem. However, more serious problems can develop, such as:   · Bleeding. This can happen if the dressing in the wound comes into contact with blood vessels. A little bleeding may occur when the dressing is being changed. This is normal now and then. Major bleeding can happen if a large blood vessel breaks. This is more likely if you are taking blood-thinning medicine. Emergency surgery may be needed.  · Infection. This can happen if the dressing has an air leak that is not repaired within a couple of hours.  · Dehydration. This can happen if the pump sucks out too much body fluid.  DRESSING CHANGES  Your dressing will have to be changed. Sometimes this is needed once a day. Other times, a dressing change must be done 3 times a week. How often you change your dressing will depend on what your wound is like. A trained caregiver will most likely change the dressing. However, a family member or friend may be trained to change the dressing. Below are steps to change a dressing in order to prevent an infection. The steps apply to you or the person that changes your dressing.  · Wash your hands with soap and water before and after each dressing change.  · Wear  gloves and protective clothing. This may include eye protection.  · Do not allow anyone to change your dressing if they have an infection or a skin condition. Even a small cut can be a problem.  To change the dressing:   · Turn off the pump.  · Take off the adhesive drape.  · Disconnect the tube from the dressing.  · Take out the dressing that is inside the wound. If the dressing sticks, use a germ-free (sterile), saltwater solution to wet the dressing. This helps it come out more easily. If it hurts when the dressing is changed, take pain medicine 30 minutes before the dressing change.  · Cleanse the wound with normal saline or sterile water.  · Apply a skin barrier film to the skin that will be covered with the drape. This will protect the skin.  · Put a new dressing into the wound.  · Apply a new drape and tube.  · Replace the container in the pump that collects fluid if it is full. Do this at least once per week.  · Turn the pump back on.  · Your doctor will decide what setting of suction is best. Do not change the settings on the machine without talking to your nurse or doctor.  HOME CARE INSTRUCTIONS   · The VAC pump has an alarm. It goes off if there are any problems such as a leak.  ¨ Ask your caregiver what to do if the alarm goes off.  ¨ Call your caregiver right away if the alarm goes off and you cannot fix the problem.  · Do not turn off the pump for more than 2 hours.  · Check your wound carefully at each dressing change for signs of infection. Watch for redness, swelling, or any fluid leaking from the wound. If you develop an infection:  ¨ You may have to stop VAC therapy.  ¨ The wound will need to be cleaned and washed out.  ¨ You will have to take antibiotic medicine.  · Ask your caregiver what activities you should or should not do while you are getting VAC therapy. This will depend on your particular wound.  · Ask if it is okay to turn off the pump so you can take a shower. If it is okay, make sure  the wound is covered with plastic. The wound area must stay dry.  · Drink enough fluids to keep your urine clear or pale yellow.  · Eat foods that contain a lot of protein. Examples are meat, poultry, seafood, eggs, nuts, beans, and peas. Protein can help your wound heal.  SEEK MEDICAL CARE IF:  · Your wound itches or hurts.  · Dressing changes are often painful or bleeding often occurs.  · You have a headache.  · You have diarrhea.  · You have a sore throat.  · You have a rash.  · You feel nauseous.  · You feel dizzy or weak.  SEEK IMMEDIATE MEDICAL CARE IF:   · You have very bad pain.  · You have bleeding that will not stop.  · Your wound smells bad.  · You have redness, swelling, or fluid leaking from your wound.  · Your alarm goes off and you do not know what to do.  · You have a fever.  This information is not intended to replace advice given to you by your health care provider. Make sure you discuss any questions you have with your health care provider.  Document Released: 03/11/2013 Document Reviewed: 09/22/2016  [employer] Interactive Patient Education © 2017 [employer]      Colostomy, Adult  Introduction  A colostomy is a surgical procedure that involves attaching part of the colon to the front of the abdomen (abdominal wall). The colon is the last part of the digestive tract. It is where water is absorbed from digested food to form stool (feces). A colostomy is done to redirect stool through an opening (stoma) in the abdominal wall.  You may need this surgery if you have a medical condition that prevents stool from leaving your body through the usual opening (rectum). A bag will be attached to the stoma on the outside of your body. This bag will collect the stool and waste that is redirected through the stoma. A colostomy may be temporary or permanent.  Tell a health care provider about:  · Any allergies you have.  · All medicines you are taking, including vitamins, herbs, eye drops, creams, and  over-the-counter medicines.  · Any problems you or family members have had with anesthetic medicines.  · Any blood disorders you have.  · Any surgeries you have had.  · Any medical conditions you have.  · Whether you are pregnant or may be pregnant.  What are the risks?  Generally, this is a safe procedure. However, problems may occur, including:  · Infection.  · Bleeding.  · Allergic reactions to medicines.  · Damage to other structures or organs.  · Leaking of stool inside the abdomen.  · Formation of scar tissue that causes a blockage.  What happens before the procedure?  · Follow instructions from your health care provider about eating or drinking restrictions.  · Ask your health care provider about:  ¨ Changing or stopping your regular medicines. This is especially important if you are taking diabetes medicines or blood thinners.  ¨ Taking medicines such as aspirin and ibuprofen. These medicines can thin your blood. Do not take these medicines before your procedure if your health care provider instructs you not to.  · Do not use any tobacco products, such as cigarettes, chewing tobacco, and e-cigarettes. If you need help quitting, ask your health care provider.  · Plan to have someone take you home after the procedure.  · You may have an exam or testing.  · Ask your health care provider how your surgical site will be marked or identified.  · You may be given antibiotic medicine to help prevent infection.  What happens during the procedure?  · To reduce your risk of infection:  ¨ Your health care team will wash or sanitize their hands.  ¨ Your skin will be washed with soap.  · An IV tube will be inserted into one of your veins.  · A drainage tube may be passed through your nose and into your stomach (NG tube, or nasogastric tube).  · You may be given a medicine to help you relax (sedative).  · You will be given a medicine to make you fall asleep (general anesthetic).  · An incision will be made in the front of  your abdomen.  · The muscles under the skin will be divided or .  · The next steps will vary depending on the type of colostomy. There are two main types:  ¨ End colostomy. Part of the colon will be removed, or the colon will be divided into two separate parts. The end of the colon that is attached to the upper part of the digestive tract will be attached to the wall of the abdomen, creating a stoma. The other end will be closed off.  ¨ Loop colostomy. A part of the colon will be pulled through the incision in the abdomen. Two openings will be made in the side of the colon. The colon will be attached to the skin at these openings, creating the stoma.  · Stitches (sutures) will be used to create the stoma and close the incision.  · A colostomy bag will be placed over the stoma to collect stool and mucus.  The procedure may vary among health care providers and hospitals.  What happens after the procedure?  · Your blood pressure, heart rate, breathing rate, and blood oxygen level will be monitored often until the medicines you were given have worn off.  · You will be given pain medicine as needed.  · You will receive fluids and nutrition through an IV tube.  · As soon as you are eating well and passing stool through the colostomy, the nasogastric tube and the IV tube may be removed.  · Do not drive for 24 hours if you received a sedative.  This information is not intended to replace advice given to you by your health care provider. Make sure you discuss any questions you have with your health care provider.  Document Released: 05/09/2012 Document Revised: 05/25/2017 Document Reviewed: 08/30/2016  © 2017 [employer]    Depression / Suicide Risk    As you are discharged from this [facility] facility, it is important to learn how to keep safe from harming yourself.    Recognize the warning signs:  · Abrupt changes in personality, positive or negative- including increase in energy   · Giving away  possessions  · Change in eating patterns- significant weight changes-  positive or negative  · Change in sleeping patterns- unable to sleep or sleeping all the time   · Unwillingness or inability to communicate  · Depression  · Unusual sadness, discouragement and loneliness  · Talk of wanting to die  · Neglect of personal appearance   · Rebelliousness- reckless behavior  · Withdrawal from people/activities they love  · Confusion- inability to concentrate     If you or a loved one observes any of these behaviors or has concerns about self-harm, here's what you can do:  · Talk about it- your feelings and reasons for harming yourself  · Remove any means that you might use to hurt yourself (examples: pills, rope, extension cords, firearm)  · Get professional help from the community (Mental Health, Substance Abuse, psychological counseling)  · Do not be alone:Call your Safe Contact- someone whom you trust who will be there for you.  · Call your local CRISIS HOTLINE 794-0508 or 711-759-6499  · Call your local Children's Mobile Crisis Response Team [place] (009) 899-2373 or www.[employer]  · Call the toll free National Suicide Prevention Hotlines   · National Suicide Prevention Lifeline 298-854-DUJM (0336)  · National Hope Line Network 800-SUICIDE (821-6741)

## 2019-07-31 NOTE — PROGRESS NOTES
2 RN Skin Check:   Discoloration to the left shin.   Blanchable redness on the right shin.   Left later calf wound with dressing.   LLQ ostomy. Stoma red and moist. Blister on the outside of the ostomy bag. Possibly from tape.   2 abdominal lap sites with dermabond. No redness or drainage.   Wound vac and dressing to sacrum. Wound vac to continuous suction and veraflow.
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
## 2019-08-01 LAB
ALBUMIN SERPL BCP-MCNC: 2.5 G/DL (ref 3.2–4.9)
ALBUMIN/GLOB SERPL: 0.7 G/DL
ALP SERPL-CCNC: 60 U/L (ref 30–99)
ALT SERPL-CCNC: 13 U/L (ref 2–50)
ANION GAP SERPL CALC-SCNC: 9 MMOL/L (ref 0–11.9)
APPEARANCE UR: CLEAR
AST SERPL-CCNC: 12 U/L (ref 12–45)
BASOPHILS # BLD AUTO: 0.5 % (ref 0–1.8)
BASOPHILS # BLD: 0.03 K/UL (ref 0–0.12)
BILIRUB SERPL-MCNC: 0.4 MG/DL (ref 0.1–1.5)
BILIRUB UR QL STRIP.AUTO: NEGATIVE
BUN SERPL-MCNC: 10 MG/DL (ref 8–22)
CALCIUM SERPL-MCNC: 8.5 MG/DL (ref 8.4–10.2)
CHLORIDE SERPL-SCNC: 108 MMOL/L (ref 96–112)
CO2 SERPL-SCNC: 24 MMOL/L (ref 20–33)
COLOR UR: YELLOW
CREAT SERPL-MCNC: 0.57 MG/DL (ref 0.5–1.4)
EOSINOPHIL # BLD AUTO: 0.21 K/UL (ref 0–0.51)
EOSINOPHIL NFR BLD: 3.2 % (ref 0–6.9)
ERYTHROCYTE [DISTWIDTH] IN BLOOD BY AUTOMATED COUNT: 56.7 FL (ref 35.9–50)
GLOBULIN SER CALC-MCNC: 3.5 G/DL (ref 1.9–3.5)
GLUCOSE SERPL-MCNC: 88 MG/DL (ref 65–99)
GLUCOSE UR STRIP.AUTO-MCNC: NEGATIVE MG/DL
HCT VFR BLD AUTO: 28.6 % (ref 42–52)
HGB BLD-MCNC: 8.4 G/DL (ref 14–18)
IMM GRANULOCYTES # BLD AUTO: 0.04 K/UL (ref 0–0.11)
IMM GRANULOCYTES NFR BLD AUTO: 0.6 % (ref 0–0.9)
KETONES UR STRIP.AUTO-MCNC: NEGATIVE MG/DL
LEUKOCYTE ESTERASE UR QL STRIP.AUTO: NEGATIVE
LYMPHOCYTES # BLD AUTO: 1.78 K/UL (ref 1–4.8)
LYMPHOCYTES NFR BLD: 26.7 % (ref 22–41)
MAGNESIUM SERPL-MCNC: 2 MG/DL (ref 1.5–2.5)
MCH RBC QN AUTO: 25.1 PG (ref 27–33)
MCHC RBC AUTO-ENTMCNC: 29.4 G/DL (ref 33.7–35.3)
MCV RBC AUTO: 85.4 FL (ref 81.4–97.8)
MICRO URNS: NORMAL
MONOCYTES # BLD AUTO: 0.53 K/UL (ref 0–0.85)
MONOCYTES NFR BLD AUTO: 8 % (ref 0–13.4)
NEUTROPHILS # BLD AUTO: 4.07 K/UL (ref 1.82–7.42)
NEUTROPHILS NFR BLD: 61 % (ref 44–72)
NITRITE UR QL STRIP.AUTO: NEGATIVE
NRBC # BLD AUTO: 0 K/UL
NRBC BLD-RTO: 0 /100 WBC
PH UR STRIP.AUTO: 5.5 [PH] (ref 5–8)
PHOSPHATE SERPL-MCNC: 4.3 MG/DL (ref 2.5–4.5)
PLATELET # BLD AUTO: 291 K/UL (ref 164–446)
PMV BLD AUTO: 8.2 FL (ref 9–12.9)
POTASSIUM SERPL-SCNC: 3.5 MMOL/L (ref 3.6–5.5)
PROT SERPL-MCNC: 6 G/DL (ref 6–8.2)
PROT UR QL STRIP: NEGATIVE MG/DL
RBC # BLD AUTO: 3.35 M/UL (ref 4.7–6.1)
RBC UR QL AUTO: NEGATIVE
SODIUM SERPL-SCNC: 141 MMOL/L (ref 135–145)
SP GR UR STRIP.AUTO: <=1.005
WBC # BLD AUTO: 6.7 K/UL (ref 4.8–10.8)

## 2019-08-01 PROCEDURE — 81003 URINALYSIS AUTO W/O SCOPE: CPT

## 2019-08-01 PROCEDURE — 80053 COMPREHEN METABOLIC PANEL: CPT

## 2019-08-01 PROCEDURE — 83735 ASSAY OF MAGNESIUM: CPT

## 2019-08-01 PROCEDURE — 85025 COMPLETE CBC W/AUTO DIFF WBC: CPT

## 2019-08-01 PROCEDURE — 84100 ASSAY OF PHOSPHORUS: CPT

## 2019-08-02 ENCOUNTER — APPOINTMENT (OUTPATIENT)
Dept: RADIOLOGY | Facility: MEDICAL CENTER | Age: 69
End: 2019-08-02
Attending: HOSPITALIST
Payer: MEDICARE

## 2019-08-02 PROCEDURE — 71045 X-RAY EXAM CHEST 1 VIEW: CPT

## 2019-08-03 LAB — VANCOMYCIN TROUGH SERPL-MCNC: 18.1 UG/ML (ref 10–20)

## 2019-08-03 PROCEDURE — 80202 ASSAY OF VANCOMYCIN: CPT

## 2019-08-03 ASSESSMENT — ENCOUNTER SYMPTOMS
FEVER: 0
ABDOMINAL PAIN: 0
DIARRHEA: 0
CHILLS: 0
VOMITING: 0
BACK PAIN: 0
NAUSEA: 0
NECK PAIN: 0
CONSTIPATION: 0
MYALGIAS: 0

## 2019-08-03 NOTE — [facility]
Infectious Disease Progress Note    Author: Lisa Olson M.D. Date & Time of service: 8/3/2019  7:06 AM    Chief Complaint:  Sacral decubitus  Sacral osteomyelitis     Interval History:  69 y.o. WM with  C5- 7 complete quadriplegia admitted 7/24/2019 from [facility] on 7/24/2019 for wound check on his coccyx. Large decub to bone  7/26 AF WBC 5.9  seen with wound care-no surrounding cellulitis. Planned for CT and biopsy today  7/27 AF n IR declined to biopsy sacral bone. No fever or chills-had colostomy done this am  7/28 afebrile WBC 7.1 patient transferred to [facility] after colostomy creation yesterday.  He denies any abdominal pain.  Tolerating IV antibiotics.  7/30- AF, WBC 5.8, no issue with antibiotics, denies any pain, reports plastic surgeon wanting to hold off on flap at this time.  7/31-AF, WBC 5.9, tolerating antibiotics, WV in place to sacrum, denies any pain, agreeable to PICC placement for need of IV antibiotics x6 weeks.       Review of Systems:  Review of Systems   Constitutional: Negative for chills and fever.   Gastrointestinal: Negative for abdominal pain, constipation, diarrhea, nausea and vomiting.   Musculoskeletal: Negative for back pain, myalgias and neck pain.       Hemodynamics:  No data recorded.     No data recorded           Physical Exam:  Physical Exam   Constitutional: He is oriented to person, place, and time. He appears well-developed and well-nourished.   HENT:   Head: Normocephalic and atraumatic.   Eyes: Pupils are equal, round, and reactive to light. Conjunctivae and EOM are normal.   Cardiovascular: Normal rate, regular rhythm and normal heart sounds.   Pulmonary/Chest: Effort normal and breath sounds normal.   Abdominal: Soft. Bowel sounds are normal. He exhibits no distension. There is no tenderness. There is no rebound and no guarding.   Neurological: He is alert and oriented to person, place, and time.   Quadriplegia   Skin: Skin is warm and dry.    Sacral ulcer with wound VAC in place   Psychiatric: He has a normal mood and affect. His behavior is normal.       Meds:  No current facility-administered medications for this encounter.     Labs:  Recent Labs     08/01/19  0236   WBC 6.7   RBC 3.35*   HEMOGLOBIN 8.4*   HEMATOCRIT 28.6*   MCV 85.4   MCH 25.1*   RDW 56.7*   PLATELETCT 291   MPV 8.2*   NEUTSPOLYS 61.00   LYMPHOCYTES 26.70   MONOCYTES 8.00   EOSINOPHILS 3.20   BASOPHILS 0.50     Recent Labs     08/01/19  0236   SODIUM 141   POTASSIUM 3.5*   CHLORIDE 108   CO2 24   GLUCOSE 88   BUN 10     Recent Labs     08/01/19  0236   ALBUMIN 2.5*   TBILIRUBIN 0.4   ALKPHOSPHAT 60   TOTPROTEIN 6.0   ALTSGPT 13   ASTSGOT 12   CREATININE 0.57       Imaging:  Ct-pelvis With    Result Date: 7/26/2019 7/26/2019 4:10 PM HISTORY/REASON FOR EXAM:  Presacral wound, pelvic pain. TECHNIQUE/EXAM DESCRIPTION AND NUMBER OF VIEWS: CT scan of the pelvis with contrast. Contrast-enhanced helical scanning of the pelvis was obtained from the iliac crests through the pubic symphysis following the bolus administration of 100 mL of Omnipaque 350 nonionic contrast without complication. Low dose optimization technique was utilized for this CT exam including automated exposure control and adjustment of the mA and/or kV according to patient size. COMPARISON:  None. FINDINGS: There is a large stage IV decubitus ulcer superficial to the distal sacrum/coccyx. It is 3.8 cm deep. The wound extends to the cortical bone surface. S5 vertebral body and a portion of the coccyx are partially absent, and may have been surgically resected or resorbed by an infectious process. Edema/stranding of presacral fat, likely related to infection. A 2 x 1.4 cm collection is seen on the undersurface of the base of the penis, away from the decubitus ulcer. Chronic widening of the pubic symphysis, etiology uncertain. Partially visualized surgical hardware in the left femur. Lumbosacral spondylosis. Moderate amount  of stool throughout the colon. No pelvic lymphadenopathy. Nonobstructing left renal stone. Cazares catheter within the bladder. Air within the bladder is likely related to catheter instrumentation.     1.  A 3.8 cm deep decubitus ulcer, extending to the cortical bone, suggestive of osteomyelitis. 2.  S5 vertebral body and a portion of the coccyx are partially absent, and may have been surgically resected or resorbed by chronic osteomyelitis. 3.  Presacral edema related to infection. No presacral abscess. 4.  A 2 cm collection at the base of the penis may represent an abscess. It is away from the decubitus ulcer. 5.  Chronic widening of pubic symphysis, nonspecific. 6.  Nonobstructing left nephrolithiasis.    Dx-chest-portable (1 View)    Result Date: 8/2/2019 8/2/2019 9:49 AM HISTORY/REASON FOR EXAM: Right-sided PICC line placement. Hypertension. TECHNIQUE/EXAM DESCRIPTION AND NUMBER OF VIEWS: Single portable view of the chest. COMPARISON: 7/24/2019 FINDINGS: Right PICC line tip overlies the superior vena cava. There is linear atelectasis within the left lung base. There is minimal left pleural effusion. The heart is enlarged.     1.  Right PICC line tip satisfactory in position overlying the superior vena cava. 2.  Cardiomegaly. 3.  Left lung base linear atelectasis with a small left pleural effusion.    Dx-chest-portable (1 View)    Result Date: 7/24/2019 7/24/2019 11:57 AM HISTORY/REASON FOR EXAM:  Sepsis protocol. Atrial fibrillation. Hypertension. TECHNIQUE/EXAM DESCRIPTION AND NUMBER OF VIEWS: Single portable view of the chest. COMPARISON: None available. FINDINGS: The mediastinal and cardiac silhouette is enlarged. The pulmonary vascularity is within normal limits. The lung parenchyma is clear. There is no significant pleural effusion. There is no visible pneumothorax. There are postsurgical changes in the cervical thoracic spine. There are right lateral rib fractures, probably old.     1.  There is no acute  cardiopulmonary process. 2.  There is an enlarged cardiac silhouette.      Micro:  Results     Procedure Component Value Units Date/Time    URINALYSIS [[id number]] Collected:  08/01/19 2015    Order Status:  Completed Specimen:  Urine, Cazares Cath Updated:  08/01/19 2251     Color Yellow     Character Clear     Specific Gravity <=1.005     Ph 5.5     Glucose Negative mg/dL      Ketones Negative mg/dL      Protein Negative mg/dL      Bilirubin Negative     Nitrite Negative     Leukocyte Esterase Negative     Occult Blood Negative     Micro Urine Req see below     Comment: Microscopic examination not performed when specimen is clear  and chemically negative for protein, blood, leukocyte esterase  and nitrite.         Narrative:       Collected By:[id number] CHRISTAL DICK        Interval 24 hour assessment:    AF, Vitals WNL  Labs reviewed  Micro reviewed    Pt continued on ctx, vanco and flagyl.  Patient doing well with no specific complaints.  Reports he is tolerating antibiotics with no rash no diarrhea.      Plan:  Sacral decubitis, Stage IV with underlying sacral osteomyelitis  Fluid collection, concern for abscess base of penis on 7/26 CT  Wound culture polymicrobial (MRSA, Bfrag, Strep Constellatus/Milleri)  Blood cxs on 7/24 negative  CT on 7/26 w/ 3.8 cm deep decub ulcer extending to bone, presacral edema, 2 cm fluid collection at base of penis may be abscess  Refused MRI-OSH   XRAY + OM by report  s/p diverting colostomy on 7/27 by Dr. Hannah  IR unable to do a bone biopsy due to extent of wound  Dr. Mott planning on flap placement in roughly 1 month  Continue IV Vancomycin and Unasyn 3 g every 6 hours  Monitor renal function and Vanco troughs  Will treat with IV antibiotics for 6 weeks as there is a plan for flap placement in about a month  Estimated end date 9/4/19  PICC ordered- To be placed at [facility] as patient transferring this afternoon  Okay for patient to be on IV ceftriaxone and p.o. Flagyl while at  [facility] due to a shortage of Unasyn.  He will continue on IV vancomycin as well.     Cervical quad  Impediment to offloading and healing     Discussed with internal medicine.

## 2019-08-04 LAB
ANION GAP SERPL CALC-SCNC: 6 MMOL/L (ref 0–11.9)
BUN SERPL-MCNC: 12 MG/DL (ref 8–22)
CALCIUM SERPL-MCNC: 8.4 MG/DL (ref 8.4–10.2)
CHLORIDE SERPL-SCNC: 107 MMOL/L (ref 96–112)
CO2 SERPL-SCNC: 25 MMOL/L (ref 20–33)
CREAT SERPL-MCNC: 0.57 MG/DL (ref 0.5–1.4)
GLUCOSE SERPL-MCNC: 88 MG/DL (ref 65–99)
POTASSIUM SERPL-SCNC: 3.8 MMOL/L (ref 3.6–5.5)
SODIUM SERPL-SCNC: 138 MMOL/L (ref 135–145)

## 2019-08-04 PROCEDURE — 80048 BASIC METABOLIC PNL TOTAL CA: CPT

## 2019-08-05 LAB — VANCOMYCIN TROUGH SERPL-MCNC: 17.9 UG/ML (ref 10–20)

## 2019-08-05 PROCEDURE — 80202 ASSAY OF VANCOMYCIN: CPT

## 2019-08-08 LAB — VANCOMYCIN TROUGH SERPL-MCNC: 14.5 UG/ML (ref 10–20)

## 2019-08-08 PROCEDURE — 80202 ASSAY OF VANCOMYCIN: CPT

## 2019-08-08 ASSESSMENT — ENCOUNTER SYMPTOMS
VOMITING: 0
ABDOMINAL PAIN: 0
CHILLS: 0
BACK PAIN: 0
NAUSEA: 0
MYALGIAS: 0
DIARRHEA: 0
FEVER: 0
CONSTIPATION: 0

## 2019-08-08 NOTE — CONSULTS
DATE OF SERVICE:  07/26/2019    REASON FOR CONSULTATION:  Sacral pressure wound.    CONSULTANT:  Francesca Berumen MD    BRIEF HISTORY OF PRESENTING ILLNESS:  The patient is a 69-year-old gentleman   who has a C7-T4 spinal injury resulting in quadriplegia.  He was at a skilled   nursing facility and developed a pressure ulceration and he has been admitted   for workup and evaluation.  He reports having MRSA in the past.  I have been   consulted for evaluation and management as he likely has osteomyelitis   associated with the wound.    PAST MEDICAL HISTORY:  Atrial fibrillation, gastroesophageal reflux disease,   hypertension, ____ quadriplegia complete.     PAST SURGICAL HISTORY:  Spinal surgery.    FAMILY HISTORY:  Noncontributory.    SOCIAL HISTORY:  He was a former smoker, but has quit smoking, no alcohol.    ALLERGIES:  SULFA.    MEDICATIONS (PRIOR TO ADMISSION):  Vitamin C, melatonin, baclofen, Celebrex,   Cipro, Proscar, flecainide, Cozaar, Lopressor, Protonix, rivaroxaban,   multivitamin, trazodone.    PHYSICAL EXAMINATION:  Vitals stably normal, afebrile.  There is a sacral   pressure wound, which is approximately 8 x 7 x 5 cm with some undermining from   1-7 cm.  The wound is clean with a nice healthy granulation tissue throughout   with no signs of erythema and normal appearing drainage.  This area involves   the sacrum and is likely due to lying in a supine position for a prolonged   period of time.    LABORATORY DATA:  WBC within normal limits.      IMPRESSION:  Stage IV sacral pressure wound.     PLAN:  Recommend VAC therapy until the wound has contracted in size.  Once the   wound shows evidence of healing, a skin flap can be performed.    Pressure preventive measures are in place and adequate nutrition is essential   for healing.    I recommend patient be transferred to long-term acute care facility so that   intensive wound care therapy as well as antibiotics can be given and so he is   ready for  flap closure.  Would appreciate reconsultation once this is   achieved.    Thank you for involving me in the care of this patient.       ____________________________________     NEMESIO MARTINEZ MD LMT / [place]    DD:  08/07/2019 14:59:53  DT:  08/07/2019 21:52:01    D#:  [id number]  Job#:  [id number]

## 2019-08-08 NOTE — [facility]
Infectious Disease Progress Note    Author: Lisa Olson M.D. Date & Time of service: 8/8/2019  11:24 AM    Chief Complaint:  Sacral decubitus  Sacral osteomyelitis     Interval History:  69 y.o. WM with  C5- 7 complete quadriplegia admitted 7/24/2019 from [facility] on 7/24/2019 for wound check on his coccyx. Large decub to bone  7/26 AF WBC 5.9  seen with wound care-no surrounding cellulitis. Planned for CT and biopsy today  7/27 AF n IR declined to biopsy sacral bone. No fever or chills-had colostomy done this am  7/28 afebrile WBC 7.1 patient transferred to [facility] after colostomy creation yesterday.  He denies any abdominal pain.  Tolerating IV antibiotics.  7/30- AF, WBC 5.8, no issue with antibiotics, denies any pain, reports plastic surgeon wanting to hold off on flap at this time.  7/31-AF, WBC 5.9, tolerating antibiotics, WV in place to sacrum, denies any pain, agreeable to PICC placement for need of IV antibiotics x6 weeks.     Review of Systems:  Review of Systems   Constitutional: Negative for chills and fever.   Gastrointestinal: Negative for abdominal pain, constipation, diarrhea, nausea and vomiting.   Musculoskeletal: Negative for back pain, joint pain and myalgias.       Hemodynamics:  No data recorded.     No data recorded           Physical Exam:  Physical Exam   Constitutional: He is oriented to person, place, and time. He appears well-developed and well-nourished.   HENT:   Head: Normocephalic and atraumatic.   Eyes: Pupils are equal, round, and reactive to light. Conjunctivae and EOM are normal.   Cardiovascular: Normal rate, regular rhythm and normal heart sounds.   Pulmonary/Chest: Effort normal and breath sounds normal.   Abdominal: Soft. Bowel sounds are normal.   Neurological: He is alert and oriented to person, place, and time.   Paraplegia   Skin: Skin is warm and dry.   Large sacral ulcer, reviewed photo, clean base, no sign of infection   Psychiatric: He  has a normal mood and affect. His behavior is normal.       Meds:  No current facility-administered medications for this encounter.     Labs:  No results for input(s): WBC, RBC, HEMOGLOBIN, HEMATOCRIT, MCV, MCH, RDW, PLATELETCT, MPV, NEUTSPOLYS, LYMPHOCYTES, MONOCYTES, EOSINOPHILS, BASOPHILS, RBCMORPHOLO in the last 72 hours.  No results for input(s): SODIUM, POTASSIUM, CHLORIDE, CO2, GLUCOSE, BUN, CPKTOTAL in the last 72 hours.  No results for input(s): ALBUMIN, TBILIRUBIN, ALKPHOSPHAT, TOTPROTEIN, ALTSGPT, ASTSGOT, CREATININE in the last 72 hours.    Imaging:  Ct-pelvis With    Result Date: 7/26/2019 7/26/2019 4:10 PM HISTORY/REASON FOR EXAM:  Presacral wound, pelvic pain. TECHNIQUE/EXAM DESCRIPTION AND NUMBER OF VIEWS: CT scan of the pelvis with contrast. Contrast-enhanced helical scanning of the pelvis was obtained from the iliac crests through the pubic symphysis following the bolus administration of 100 mL of Omnipaque 350 nonionic contrast without complication. Low dose optimization technique was utilized for this CT exam including automated exposure control and adjustment of the mA and/or kV according to patient size. COMPARISON:  None. FINDINGS: There is a large stage IV decubitus ulcer superficial to the distal sacrum/coccyx. It is 3.8 cm deep. The wound extends to the cortical bone surface. S5 vertebral body and a portion of the coccyx are partially absent, and may have been surgically resected or resorbed by an infectious process. Edema/stranding of presacral fat, likely related to infection. A 2 x 1.4 cm collection is seen on the undersurface of the base of the penis, away from the decubitus ulcer. Chronic widening of the pubic symphysis, etiology uncertain. Partially visualized surgical hardware in the left femur. Lumbosacral spondylosis. Moderate amount of stool throughout the colon. No pelvic lymphadenopathy. Nonobstructing left renal stone. Cazares catheter within the bladder. Air within the bladder  is likely related to catheter instrumentation.     1.  A 3.8 cm deep decubitus ulcer, extending to the cortical bone, suggestive of osteomyelitis. 2.  S5 vertebral body and a portion of the coccyx are partially absent, and may have been surgically resected or resorbed by chronic osteomyelitis. 3.  Presacral edema related to infection. No presacral abscess. 4.  A 2 cm collection at the base of the penis may represent an abscess. It is away from the decubitus ulcer. 5.  Chronic widening of pubic symphysis, nonspecific. 6.  Nonobstructing left nephrolithiasis.    Dx-chest-portable (1 View)    Result Date: 8/2/2019 8/2/2019 9:49 AM HISTORY/REASON FOR EXAM: Right-sided PICC line placement. Hypertension. TECHNIQUE/EXAM DESCRIPTION AND NUMBER OF VIEWS: Single portable view of the chest. COMPARISON: 7/24/2019 FINDINGS: Right PICC line tip overlies the superior vena cava. There is linear atelectasis within the left lung base. There is minimal left pleural effusion. The heart is enlarged.     1.  Right PICC line tip satisfactory in position overlying the superior vena cava. 2.  Cardiomegaly. 3.  Left lung base linear atelectasis with a small left pleural effusion.    Dx-chest-portable (1 View)    Result Date: 7/24/2019 7/24/2019 11:57 AM HISTORY/REASON FOR EXAM:  Sepsis protocol. Atrial fibrillation. Hypertension. TECHNIQUE/EXAM DESCRIPTION AND NUMBER OF VIEWS: Single portable view of the chest. COMPARISON: None available. FINDINGS: The mediastinal and cardiac silhouette is enlarged. The pulmonary vascularity is within normal limits. The lung parenchyma is clear. There is no significant pleural effusion. There is no visible pneumothorax. There are postsurgical changes in the cervical thoracic spine. There are right lateral rib fractures, probably old.     1.  There is no acute cardiopulmonary process. 2.  There is an enlarged cardiac silhouette.      Micro:  Results     Procedure Component Value Units Date/Time     URINALYSIS [[id number]] Collected:  08/01/19 2015    Order Status:  Completed Specimen:  Urine, Cazares Cath Updated:  08/01/19 2251     Color Yellow     Character Clear     Specific Gravity <=1.005     Ph 5.5     Glucose Negative mg/dL      Ketones Negative mg/dL      Protein Negative mg/dL      Bilirubin Negative     Nitrite Negative     Leukocyte Esterase Negative     Occult Blood Negative     Micro Urine Req see below     Comment: Microscopic examination not performed when specimen is clear  and chemically negative for protein, blood, leukocyte esterase  and nitrite.         Narrative:       Collected By:[id number] CHRISTAL DICK        Interval 24 hour assessment:    AF, O2  Labs reviewed    Pt continued on antibiotics.  Appears to be tolerating is doing well.  Discussed with wound care and while his wound is still large and tracks it is improving.  It is clean and has good granulation tissue.    Plan:  Sacral decubitis, Stage IV with underlying sacral osteomyelitis  Fluid collection, concern for abscess base of penis on 7/26 CT  Wound culture polymicrobial (MRSA, Bfrag, Strep Constellatus/Milleri)  Blood cxs on 7/24 negative  CT on 7/26 w/ 3.8 cm deep decub ulcer extending to bone, presacral edema, 2 cm fluid collection at base of penis may be abscess  Refused MRI-OSH   XRAY + OM by report  s/p diverting colostomy on 7/27 by Dr. Hannah  IR unable to do a bone biopsy due to extent of wound  Dr. Mott planning on flap placement in roughly 1 month  Continue IV Vancomycin and Unasyn 3 g every 6 hours  Monitor renal function and Vanco troughs  Will treat with IV antibiotics for 6 weeks as there is a plan for flap placement in about a month  Estimated end date 9/4/19  PICC ordered- To be placed at [facility] as patient transferring this afternoon  Okay for patient to be on IV ceftriaxone and p.o. Flagyl while at [facility] due to a shortage of Unasyn.  He will continue on IV vancomycin as well.  Wound team has notified plastics  and patient is very interested in flap     Cervical quad  Impediment to offloading and healing     Discussed with wound care team.  ID will follow.

## 2019-08-09 LAB
ANION GAP SERPL CALC-SCNC: 6 MMOL/L (ref 0–11.9)
BUN SERPL-MCNC: 10 MG/DL (ref 8–22)
CALCIUM SERPL-MCNC: 8.5 MG/DL (ref 8.4–10.2)
CHLORIDE SERPL-SCNC: 108 MMOL/L (ref 96–112)
CO2 SERPL-SCNC: 27 MMOL/L (ref 20–33)
CREAT SERPL-MCNC: 0.59 MG/DL (ref 0.5–1.4)
GLUCOSE SERPL-MCNC: 91 MG/DL (ref 65–99)
POTASSIUM SERPL-SCNC: 4 MMOL/L (ref 3.6–5.5)
SODIUM SERPL-SCNC: 141 MMOL/L (ref 135–145)

## 2019-08-09 PROCEDURE — 80048 BASIC METABOLIC PNL TOTAL CA: CPT

## 2019-08-09 ASSESSMENT — ENCOUNTER SYMPTOMS
CONSTIPATION: 0
FEVER: 0
MYALGIAS: 0
BACK PAIN: 0
NAUSEA: 0
DIARRHEA: 0
CHILLS: 0
VOMITING: 0
ABDOMINAL PAIN: 0

## 2019-08-09 NOTE — [facility]
Infectious Disease Progress Note    Author: Jessica Wilson M.D. Date & Time of service: 8/9/2019  10:54 AM    Chief Complaint:  Sacral decubitus  Sacral osteomyelitis     Interval History:  69 y.o. WM with  C5- 7 complete quadriplegia admitted 7/24/2019 from [facility] on 7/24/2019 for wound check on his coccyx. Large decub to bone  7/26 AF WBC 5.9  seen with wound care-no surrounding cellulitis. Planned for CT and biopsy today  7/27 AF n IR declined to biopsy sacral bone. No fever or chills-had colostomy done this am  7/28 afebrile WBC 7.1 patient transferred to [facility] after colostomy creation yesterday.  He denies any abdominal pain.  Tolerating IV antibiotics.  7/30- AF, WBC 5.8, no issue with antibiotics, denies any pain, reports plastic surgeon wanting to hold off on flap at this time.  7/31-AF, WBC 5.9, tolerating antibiotics, WV in place to sacrum, denies any pain, agreeable to PICC placement for need of IV antibiotics x6 weeks.  8/9/2019 no fevers.  Creatinine is 0.59 Denies any pain or any new issues.  Review of Systems:  Review of Systems   Constitutional: Negative for chills and fever.   Gastrointestinal: Negative for abdominal pain, constipation, diarrhea, nausea and vomiting.   Musculoskeletal: Negative for back pain, joint pain and myalgias.       Hemodynamics:      T 97.7.P70 RR18 /86 Sat 97%          Physical Exam:  Physical Exam   Constitutional: He is oriented to person, place, and time. He appears well-developed.   Sitting in wheelchair   HENT:   Head: Normocephalic and atraumatic.   Eyes: Pupils are equal, round, and reactive to light. Conjunctivae and EOM are normal.   Cardiovascular: Normal rate, regular rhythm and normal heart sounds.   Pulmonary/Chest: Effort normal and breath sounds normal.   ostomy   Abdominal: Soft. Bowel sounds are normal.   Neurological: He is alert and oriented to person, place, and time.   Paraplegia   Skin: Skin is warm and dry.   Large  sacral ulcer, reviewed photo, clean base, no sign of infection   Psychiatric: He has a normal mood and affect. His behavior is normal.   Vitals reviewed.      Meds:  Unasyn  vanco    Labs:  No results for input(s): WBC, RBC, HEMOGLOBIN, HEMATOCRIT, MCV, MCH, RDW, PLATELETCT, MPV, NEUTSPOLYS, LYMPHOCYTES, MONOCYTES, EOSINOPHILS, BASOPHILS, RBCMORPHOLO in the last 72 hours.  Recent Labs     08/09/19  0642   SODIUM 141   POTASSIUM 4.0   CHLORIDE 108   CO2 27   GLUCOSE 91   BUN 10     Recent Labs     08/09/19  0642   CREATININE 0.59       Imaging:  Ct-pelvis With    Result Date: 7/26/2019 7/26/2019 4:10 PM HISTORY/REASON FOR EXAM:  Presacral wound, pelvic pain. TECHNIQUE/EXAM DESCRIPTION AND NUMBER OF VIEWS: CT scan of the pelvis with contrast. Contrast-enhanced helical scanning of the pelvis was obtained from the iliac crests through the pubic symphysis following the bolus administration of 100 mL of Omnipaque 350 nonionic contrast without complication. Low dose optimization technique was utilized for this CT exam including automated exposure control and adjustment of the mA and/or kV according to patient size. COMPARISON:  None. FINDINGS: There is a large stage IV decubitus ulcer superficial to the distal sacrum/coccyx. It is 3.8 cm deep. The wound extends to the cortical bone surface. S5 vertebral body and a portion of the coccyx are partially absent, and may have been surgically resected or resorbed by an infectious process. Edema/stranding of presacral fat, likely related to infection. A 2 x 1.4 cm collection is seen on the undersurface of the base of the penis, away from the decubitus ulcer. Chronic widening of the pubic symphysis, etiology uncertain. Partially visualized surgical hardware in the left femur. Lumbosacral spondylosis. Moderate amount of stool throughout the colon. No pelvic lymphadenopathy. Nonobstructing left renal stone. Cazares catheter within the bladder. Air within the bladder is likely related  to catheter instrumentation.     1.  A 3.8 cm deep decubitus ulcer, extending to the cortical bone, suggestive of osteomyelitis. 2.  S5 vertebral body and a portion of the coccyx are partially absent, and may have been surgically resected or resorbed by chronic osteomyelitis. 3.  Presacral edema related to infection. No presacral abscess. 4.  A 2 cm collection at the base of the penis may represent an abscess. It is away from the decubitus ulcer. 5.  Chronic widening of pubic symphysis, nonspecific. 6.  Nonobstructing left nephrolithiasis.    Dx-chest-portable (1 View)    Result Date: 8/2/2019 8/2/2019 9:49 AM HISTORY/REASON FOR EXAM: Right-sided PICC line placement. Hypertension. TECHNIQUE/EXAM DESCRIPTION AND NUMBER OF VIEWS: Single portable view of the chest. COMPARISON: 7/24/2019 FINDINGS: Right PICC line tip overlies the superior vena cava. There is linear atelectasis within the left lung base. There is minimal left pleural effusion. The heart is enlarged.     1.  Right PICC line tip satisfactory in position overlying the superior vena cava. 2.  Cardiomegaly. 3.  Left lung base linear atelectasis with a small left pleural effusion.    Dx-chest-portable (1 View)    Result Date: 7/24/2019 7/24/2019 11:57 AM HISTORY/REASON FOR EXAM:  Sepsis protocol. Atrial fibrillation. Hypertension. TECHNIQUE/EXAM DESCRIPTION AND NUMBER OF VIEWS: Single portable view of the chest. COMPARISON: None available. FINDINGS: The mediastinal and cardiac silhouette is enlarged. The pulmonary vascularity is within normal limits. The lung parenchyma is clear. There is no significant pleural effusion. There is no visible pneumothorax. There are postsurgical changes in the cervical thoracic spine. There are right lateral rib fractures, probably old.     1.  There is no acute cardiopulmonary process. 2.  There is an enlarged cardiac silhouette.      Micro:  Results     ** No results found for the last 168 hours. **        Interval 24 hour  assessment:    AF, O2  Labs reviewed    Pt continued on antibiotics.  Appears to be tolerating is doing well.  Discussed with wound care and while his wound is still large and tracks it is improving.  It is clean and has good granulation tissue.    Plan:  Sacral decubitis, Stage IV with underlying sacral osteomyelitis  Fluid collection, concern for abscess base of penis on 7/26 CT  Wound culture polymicrobial (MRSA, Bfrag, Strep Constellatus/Milleri)  Blood cxs on 7/24 negative  CT on 7/26 w/ 3.8 cm deep decub ulcer extending to bone, presacral edema, 2 cm fluid collection at base of penis may be abscess  Refused MRI-OSH   XRAY + OM by report  s/p diverting colostomy on 7/27 by Dr. Hannah  IR unable to do a bone biopsy due to extent of wound  Dr. Mott planning on flap placement in roughly 1 month  Continue IV Vancomycin and Unasyn 3 g every 6 hours  Monitor renal function and Vanco troughs  Will treat with IV antibiotics for 6 weeks as there is a plan for flap placement in about a month  Estimated end date 9/4/19    Cervical quad  Impediment to offloading and healing     Discussed with wound care team.  ID will follow.

## 2019-08-10 ASSESSMENT — ENCOUNTER SYMPTOMS
BACK PAIN: 0
CONSTIPATION: 0
FEVER: 0
ABDOMINAL PAIN: 0
CHILLS: 0
NAUSEA: 0
DIARRHEA: 0
VOMITING: 0
MYALGIAS: 0

## 2019-08-10 NOTE — [facility]
Infectious Disease Progress Note    Author: Jessica Wilson M.D. Date & Time of service: 8/10/2019  10:58 AM    Chief Complaint:  Sacral decubitus  Sacral osteomyelitis     Interval History:  69 y.o. WM with  C5- 7 complete quadriplegia admitted 7/24/2019 from [facility] on 7/24/2019 for wound check on his coccyx. Large decub to bone  7/26 AF WBC 5.9  seen with wound care-no surrounding cellulitis. Planned for CT and biopsy today  7/27 AF n IR declined to biopsy sacral bone. No fever or chills-had colostomy done this am  7/28 afebrile WBC 7.1 patient transferred to [facility] after colostomy creation yesterday.  He denies any abdominal pain.  Tolerating IV antibiotics.  7/30- AF, WBC 5.8, no issue with antibiotics, denies any pain, reports plastic surgeon wanting to hold off on flap at this time.  7/31-AF, WBC 5.9, tolerating antibiotics, WV in place to sacrum, denies any pain, agreeable to PICC placement for need of IV antibiotics x6 weeks.  8/9/2019 no fevers.  Creatinine is 0.59 Denies any pain or any new issues.  8/10/2019-no fevers.  No new issues overnight.  Review of Systems:  Review of Systems   Constitutional: Negative for chills and fever.   Gastrointestinal: Negative for abdominal pain, constipation, diarrhea, nausea and vomiting.   Musculoskeletal: Negative for back pain, joint pain and myalgias.       Hemodynamics:      T 97.7.P70 RR18 /86 Sat 97%          Physical Exam:  Physical Exam   Constitutional: He is oriented to person, place, and time. He appears well-developed.   Sitting in wheelchair   HENT:   Head: Normocephalic and atraumatic.   Eyes: Pupils are equal, round, and reactive to light. Conjunctivae and EOM are normal.   Cardiovascular: Normal rate, regular rhythm and normal heart sounds.   Pulmonary/Chest: Effort normal and breath sounds normal.   ostomy   Abdominal: Soft. Bowel sounds are normal.   Neurological: He is alert and oriented to person, place, and time.    Paraplegia   Skin: Skin is warm and dry.   Large sacral ulcer, reviewed photo, clean base, no sign of infection   Psychiatric: He has a normal mood and affect. His behavior is normal.   Vitals reviewed.      Meds:  Unasyn  vanco    Labs:  No results for input(s): WBC, RBC, HEMOGLOBIN, HEMATOCRIT, MCV, MCH, RDW, PLATELETCT, MPV, NEUTSPOLYS, LYMPHOCYTES, MONOCYTES, EOSINOPHILS, BASOPHILS, RBCMORPHOLO in the last 72 hours.  Recent Labs     08/09/19  0642   SODIUM 141   POTASSIUM 4.0   CHLORIDE 108   CO2 27   GLUCOSE 91   BUN 10     Recent Labs     08/09/19  0642   CREATININE 0.59       Imaging:  Ct-pelvis With    Result Date: 7/26/2019 7/26/2019 4:10 PM HISTORY/REASON FOR EXAM:  Presacral wound, pelvic pain. TECHNIQUE/EXAM DESCRIPTION AND NUMBER OF VIEWS: CT scan of the pelvis with contrast. Contrast-enhanced helical scanning of the pelvis was obtained from the iliac crests through the pubic symphysis following the bolus administration of 100 mL of Omnipaque 350 nonionic contrast without complication. Low dose optimization technique was utilized for this CT exam including automated exposure control and adjustment of the mA and/or kV according to patient size. COMPARISON:  None. FINDINGS: There is a large stage IV decubitus ulcer superficial to the distal sacrum/coccyx. It is 3.8 cm deep. The wound extends to the cortical bone surface. S5 vertebral body and a portion of the coccyx are partially absent, and may have been surgically resected or resorbed by an infectious process. Edema/stranding of presacral fat, likely related to infection. A 2 x 1.4 cm collection is seen on the undersurface of the base of the penis, away from the decubitus ulcer. Chronic widening of the pubic symphysis, etiology uncertain. Partially visualized surgical hardware in the left femur. Lumbosacral spondylosis. Moderate amount of stool throughout the colon. No pelvic lymphadenopathy. Nonobstructing left renal stone. Cazares catheter within the  bladder. Air within the bladder is likely related to catheter instrumentation.     1.  A 3.8 cm deep decubitus ulcer, extending to the cortical bone, suggestive of osteomyelitis. 2.  S5 vertebral body and a portion of the coccyx are partially absent, and may have been surgically resected or resorbed by chronic osteomyelitis. 3.  Presacral edema related to infection. No presacral abscess. 4.  A 2 cm collection at the base of the penis may represent an abscess. It is away from the decubitus ulcer. 5.  Chronic widening of pubic symphysis, nonspecific. 6.  Nonobstructing left nephrolithiasis.    Dx-chest-portable (1 View)    Result Date: 8/2/2019 8/2/2019 9:49 AM HISTORY/REASON FOR EXAM: Right-sided PICC line placement. Hypertension. TECHNIQUE/EXAM DESCRIPTION AND NUMBER OF VIEWS: Single portable view of the chest. COMPARISON: 7/24/2019 FINDINGS: Right PICC line tip overlies the superior vena cava. There is linear atelectasis within the left lung base. There is minimal left pleural effusion. The heart is enlarged.     1.  Right PICC line tip satisfactory in position overlying the superior vena cava. 2.  Cardiomegaly. 3.  Left lung base linear atelectasis with a small left pleural effusion.    Dx-chest-portable (1 View)    Result Date: 7/24/2019 7/24/2019 11:57 AM HISTORY/REASON FOR EXAM:  Sepsis protocol. Atrial fibrillation. Hypertension. TECHNIQUE/EXAM DESCRIPTION AND NUMBER OF VIEWS: Single portable view of the chest. COMPARISON: None available. FINDINGS: The mediastinal and cardiac silhouette is enlarged. The pulmonary vascularity is within normal limits. The lung parenchyma is clear. There is no significant pleural effusion. There is no visible pneumothorax. There are postsurgical changes in the cervical thoracic spine. There are right lateral rib fractures, probably old.     1.  There is no acute cardiopulmonary process. 2.  There is an enlarged cardiac silhouette.      Micro:  Results     ** No results found  for the last 168 hours. **        Interval 24 hour assessment:    AF, O2  Labs reviewed    Pt continued on antibiotics.  Appears to be tolerating is doing well.  Discussed with wound care and while his wound is still large and tracks it is improving.  It is clean and has good granulation tissue.    Plan:  Sacral decubitis, Stage IV with underlying sacral osteomyelitis  Fluid collection, concern for abscess base of penis on 7/26 CT  Wound culture polymicrobial (MRSA, Bfrag, Strep Constellatus/Milleri)  Blood cxs on 7/24 negative  CT on 7/26 w/ 3.8 cm deep decub ulcer extending to bone, presacral edema, 2 cm fluid collection at base of penis may be abscess  Refused MRI-OSH   XRAY + OM by report  s/p diverting colostomy on 7/27 by Dr. Hannah  IR unable to do a bone biopsy due to extent of wound  Dr. Moon planning on flap placement in roughly 1 month  Continue IV Vancomycin and Unasyn 3 g every 6 hours  Monitor renal function and Vanco troughs  Will treat with IV antibiotics for 6 weeks as there is a plan for flap placement in about a month  Estimated end date 9/4/19    Cervical quad  Impediment to offloading and healing     Discussed with wound care team.  ID will follow.

## 2019-08-11 LAB — VANCOMYCIN TROUGH SERPL-MCNC: 15.3 UG/ML (ref 10–20)

## 2019-08-11 PROCEDURE — 80202 ASSAY OF VANCOMYCIN: CPT

## 2019-08-13 ASSESSMENT — ENCOUNTER SYMPTOMS
NAUSEA: 0
FEVER: 0
BACK PAIN: 0
MYALGIAS: 0
DIARRHEA: 0
VOMITING: 0
ABDOMINAL PAIN: 0
CHILLS: 0
CONSTIPATION: 0

## 2019-08-13 NOTE — [facility]
Infectious Disease Progress Note    Author: Jessica Wilson M.D. Date & Time of service: 8/13/2019  11:45 AM    Chief Complaint:  Sacral decubitus  Sacral osteomyelitis     Interval History:  69 y.o. WM with  C5- 7 complete quadriplegia admitted 7/24/2019 from [facility] on 7/24/2019 for wound check on his coccyx. Large decub to bone  7/26 AF WBC 5.9  seen with wound care-no surrounding cellulitis. Planned for CT and biopsy today  7/27 AF n IR declined to biopsy sacral bone. No fever or chills-had colostomy done this am  7/28 afebrile WBC 7.1 patient transferred to [facility] after colostomy creation yesterday.  He denies any abdominal pain.  Tolerating IV antibiotics.  7/30- AF, WBC 5.8, no issue with antibiotics, denies any pain, reports plastic surgeon wanting to hold off on flap at this time.  7/31-AF, WBC 5.9, tolerating antibiotics, WV in place to sacrum, denies any pain, agreeable to PICC placement for need of IV antibiotics x6 weeks.  8/9/2019 no fevers.  Creatinine is 0.59 Denies any pain or any new issues.  8/10/2019-no fevers.  No new issues overnight.  8/13/2019-no fevers.  Tolerating antibiotics without any issues.  Review of Systems:  Review of Systems   Constitutional: Negative for chills and fever.   Gastrointestinal: Negative for abdominal pain, constipation, diarrhea, nausea and vomiting.   Musculoskeletal: Negative for back pain, joint pain and myalgias.       Hemodynamics:      T 98.8.P73 RR18 BP35/77 Sat 97%          Physical Exam:  Physical Exam   Constitutional: He is oriented to person, place, and time. He appears well-developed.   HENT:   Head: Normocephalic and atraumatic.   Eyes: Pupils are equal, round, and reactive to light. Conjunctivae and EOM are normal.   Cardiovascular: Normal rate, regular rhythm and normal heart sounds.   Pulmonary/Chest: Effort normal and breath sounds normal.   ostomy   Abdominal: Soft. Bowel sounds are normal.   Colostomy present    Neurological: He is alert and oriented to person, place, and time.   Paraplegia   Skin: Skin is warm and dry.   Large sacral ulcer, , clean base, no sign of infection- bone visible.   Psychiatric: He has a normal mood and affect. His behavior is normal.   Vitals reviewed.      Meds:  Unasyn  vanco    Labs:  No results for input(s): WBC, RBC, HEMOGLOBIN, HEMATOCRIT, MCV, MCH, RDW, PLATELETCT, MPV, NEUTSPOLYS, LYMPHOCYTES, MONOCYTES, EOSINOPHILS, BASOPHILS, RBCMORPHOLO in the last 72 hours.  No results for input(s): SODIUM, POTASSIUM, CHLORIDE, CO2, GLUCOSE, BUN, CPKTOTAL in the last 72 hours.  No results for input(s): ALBUMIN, TBILIRUBIN, ALKPHOSPHAT, TOTPROTEIN, ALTSGPT, ASTSGOT, CREATININE in the last 72 hours.    Imaging:  .    Dx-chest-portable (1 View)    Result Date: 7/24/2019 7/24/2019 11:57 AM HISTORY/REASON FOR EXAM:  Sepsis protocol. Atrial fibrillation. Hypertension. TECHNIQUE/EXAM DESCRIPTION AND NUMBER OF VIEWS: Single portable view of the chest. COMPARISON: None available. FINDINGS: The mediastinal and cardiac silhouette is enlarged. The pulmonary vascularity is within normal limits. The lung parenchyma is clear. There is no significant pleural effusion. There is no visible pneumothorax. There are postsurgical changes in the cervical thoracic spine. There are right lateral rib fractures, probably old.     1.  There is no acute cardiopulmonary process. 2.  There is an enlarged cardiac silhouette.      Micro:  Results     ** No results found for the last 168 hours. **        Interval 24 hour assessment:    AF, O2  Labs reviewed    Pt continued on antibiotics.  Appears to be tolerating is doing well.  Discussed with wound care and while his wound is still large and tracks it is improving.  It is clean but lacks significant granulation tissue.    Plan:  Sacral decubitis, Stage IV with underlying sacral osteomyelitis  Fluid collection, concern for abscess base of penis on 7/26 CT  Wound culture  polymicrobial (MRSA, Bfrag, Strep Constellatus/Milleri)  Blood cxs on 7/24 negative  CT on 7/26 w/ 3.8 cm deep decub ulcer extending to bone, presacral edema, 2 cm fluid collection at base of penis may be abscess  Refused MRI-OSH   XRAY + OM by report  s/p diverting colostomy on 7/27 by Dr. Hannah  IR unable to do a bone biopsy due to extent of wound  Dr. Moon planning on flap placement in roughly 1 month  Continue IV Vancomycin and Unasyn 3 g every 6 hours  Monitor renal function and Vanco troughs  Will treat with IV antibiotics for 6 weeks as there is a plan for flap placement in about a month  Estimated end date 9/4/19    Cervical quad  Impediment to offloading and healing     Discussed with wound care team.  ID will follow.

## 2019-08-15 LAB — VANCOMYCIN TROUGH SERPL-MCNC: 14.7 UG/ML (ref 10–20)

## 2019-08-15 PROCEDURE — 302244 HCHG LTACH STAT

## 2019-08-15 PROCEDURE — 80202 ASSAY OF VANCOMYCIN: CPT

## 2019-08-16 LAB
ALBUMIN SERPL BCP-MCNC: 2.8 G/DL (ref 3.2–4.9)
ALBUMIN/GLOB SERPL: 1 G/DL
ALP SERPL-CCNC: 58 U/L (ref 30–99)
ALT SERPL-CCNC: 12 U/L (ref 2–50)
ANION GAP SERPL CALC-SCNC: 6 MMOL/L (ref 0–11.9)
AST SERPL-CCNC: 16 U/L (ref 12–45)
BILIRUB SERPL-MCNC: 0.6 MG/DL (ref 0.1–1.5)
BUN SERPL-MCNC: 12 MG/DL (ref 8–22)
CALCIUM SERPL-MCNC: 8.6 MG/DL (ref 8.4–10.2)
CHLORIDE SERPL-SCNC: 108 MMOL/L (ref 96–112)
CO2 SERPL-SCNC: 27 MMOL/L (ref 20–33)
CREAT SERPL-MCNC: 0.53 MG/DL (ref 0.5–1.4)
ERYTHROCYTE [DISTWIDTH] IN BLOOD BY AUTOMATED COUNT: 72.9 FL (ref 35.9–50)
GLOBULIN SER CALC-MCNC: 2.8 G/DL (ref 1.9–3.5)
GLUCOSE SERPL-MCNC: 89 MG/DL (ref 65–99)
HCT VFR BLD AUTO: 33 % (ref 42–52)
HGB BLD-MCNC: 9.9 G/DL (ref 14–18)
MAGNESIUM SERPL-MCNC: 1.9 MG/DL (ref 1.5–2.5)
MCH RBC QN AUTO: 26.3 PG (ref 27–33)
MCHC RBC AUTO-ENTMCNC: 30 G/DL (ref 33.7–35.3)
MCV RBC AUTO: 87.5 FL (ref 81.4–97.8)
PHOSPHATE SERPL-MCNC: 4.2 MG/DL (ref 2.5–4.5)
PLATELET # BLD AUTO: 157 K/UL (ref 164–446)
PMV BLD AUTO: 8.3 FL (ref 9–12.9)
POTASSIUM SERPL-SCNC: 4.1 MMOL/L (ref 3.6–5.5)
PROT SERPL-MCNC: 5.6 G/DL (ref 6–8.2)
RBC # BLD AUTO: 3.77 M/UL (ref 4.7–6.1)
SODIUM SERPL-SCNC: 141 MMOL/L (ref 135–145)
WBC # BLD AUTO: 4.8 K/UL (ref 4.8–10.8)

## 2019-08-16 PROCEDURE — 84100 ASSAY OF PHOSPHORUS: CPT

## 2019-08-16 PROCEDURE — 80053 COMPREHEN METABOLIC PANEL: CPT

## 2019-08-16 PROCEDURE — 83735 ASSAY OF MAGNESIUM: CPT

## 2019-08-16 PROCEDURE — 85027 COMPLETE CBC AUTOMATED: CPT

## 2019-08-16 ASSESSMENT — ENCOUNTER SYMPTOMS
VOMITING: 0
DIARRHEA: 0
CHILLS: 0
COUGH: 0
SHORTNESS OF BREATH: 0
ABDOMINAL PAIN: 0
BACK PAIN: 0
NAUSEA: 0
FEVER: 0
CONSTIPATION: 0
MYALGIAS: 0

## 2019-08-16 NOTE — [facility]
Infectious Disease Progress Note    Author: Rosmery Crbatree M.D. Date & Time of service: 8/16/2019  12:13 PM    Chief Complaint:  Sacral decubitus  Sacral osteomyelitis     Interval History:  69 y.o. WM with  C5- 7 complete quadriplegia admitted 7/24/2019 from [facility] on 7/24/2019 for wound check on his coccyx. Large decub to bone  7/26 AF WBC 5.9  seen with wound care-no surrounding cellulitis. Planned for CT and biopsy today  7/27 AF n IR declined to biopsy sacral bone. No fever or chills-had colostomy done this am  7/28 afebrile WBC 7.1 patient transferred to [facility] after colostomy creation yesterday.  He denies any abdominal pain.  Tolerating IV antibiotics.  7/30- AF, WBC 5.8, no issue with antibiotics, denies any pain, reports plastic surgeon wanting to hold off on flap at this time.  7/31-AF, WBC 5.9, tolerating antibiotics, WV in place to sacrum, denies any pain, agreeable to PICC placement for need of IV antibiotics x6 weeks.  8/9/2019 no fevers.  Creatinine is 0.59 Denies any pain or any new issues.  8/10/2019-no fevers.  No new issues overnight.  8/13/2019-no fevers.  Tolerating antibiotics without any issues.  8/16 AF WBC 4.8 ate all of breakfast-feels pretty good. Denies SE abx and pain controlled. Plan for surgery next week noted  Review of Systems:  Review of Systems   Constitutional: Negative for chills and fever.   Respiratory: Negative for cough and shortness of breath.    Cardiovascular: Negative for chest pain.   Gastrointestinal: Negative for abdominal pain, constipation, diarrhea, nausea and vomiting.   Musculoskeletal: Negative for back pain, joint pain and myalgias.   All other systems reviewed and are negative.      Hemodynamics:      T 98.2 P72 RR18 /66 Sat 96%          Physical Exam:  Physical Exam   Constitutional: He is oriented to person, place, and time. No distress.   HENT:   Head: Normocephalic and atraumatic.   Mouth/Throat: No oropharyngeal  exudate.   Eyes: No scleral icterus.   Neck: Neck supple.   Cardiovascular: Normal rate.   Pulmonary/Chest: Effort normal. No respiratory distress.   ostomy   Abdominal: Soft. He exhibits no distension. There is no tenderness.   Colostomy present   Musculoskeletal: He exhibits no edema.   RUE PICC nontender   Neurological: He is alert and oriented to person, place, and time.   Paraplegia  sarcopenia   Skin: Skin is warm. He is not diaphoretic.   Large sacral ulcer, 5.5 X 2.2 X 3.2 cm   Psychiatric: He has a normal mood and affect. His behavior is normal.   Vitals reviewed.        Labs:  Recent Labs     08/16/19  0302   WBC 4.8   RBC 3.77*   HEMOGLOBIN 9.9*   HEMATOCRIT 33.0*   MCV 87.5   MCH 26.3*   RDW 72.9*   PLATELETCT 157*   MPV 8.3*     Recent Labs     08/16/19 0302   SODIUM 141   POTASSIUM 4.1   CHLORIDE 108   CO2 27   GLUCOSE 89   BUN 12     Recent Labs     08/16/19 0302   ALBUMIN 2.8*   TBILIRUBIN 0.6   ALKPHOSPHAT 58   TOTPROTEIN 5.6*   ALTSGPT 12   ASTSGOT 16   CREATININE 0.53       Imaging:  .    Dx-chest-portable (1 View)    Result Date: 7/24/2019 7/24/2019 11:57 AM HISTORY/REASON FOR EXAM:  Sepsis protocol. Atrial fibrillation. Hypertension. TECHNIQUE/EXAM DESCRIPTION AND NUMBER OF VIEWS: Single portable view of the chest. COMPARISON: None available. FINDINGS: The mediastinal and cardiac silhouette is enlarged. The pulmonary vascularity is within normal limits. The lung parenchyma is clear. There is no significant pleural effusion. There is no visible pneumothorax. There are postsurgical changes in the cervical thoracic spine. There are right lateral rib fractures, probably old.     1.  There is no acute cardiopulmonary process. 2.  There is an enlarged cardiac silhouette.      Micro:  Results     ** No results found for the last 168 hours. **        Assessment/Plan:  Sacral decubitis, Stage IV, on treatment   Complicated by underlying sacral osteomyelitis, on treatment  Fluid collection, concern for  abscess base of penis, on treatment  Afebrile  No leukocytosis  Wound culture polymicrobial (MRSA, Bfrag, Strep Constellatus/Milleri)  Blood cxs on 7/24 negative  CT on 7/26 w/ 3.8 cm deep decub ulcer extending to bone, presacral edema, 2 cm fluid collection at base of penis may be abscess  Refused MRI-OSH   XRAY + OM by report  s/p diverting colostomy on 7/27 by Dr. Hannah  IR unable to do a bone biopsy due to extent of wound  Dr. Moon planning on flap placement   Continue IV Vancomycin, ceftraixone, flagyl  Monitor renal function at least 3 times weekly and Vanco troughs  Continue IV antibiotics for 6 weeks-follow with PO if indicated  Estimated end date 9/4/19    Cervical quad  Impediment to offloading and healing

## 2019-08-17 ASSESSMENT — ENCOUNTER SYMPTOMS
CHILLS: 0
FEVER: 0
COUGH: 0
BACK PAIN: 0
SHORTNESS OF BREATH: 0
VOMITING: 0
ABDOMINAL PAIN: 0
NAUSEA: 0
MYALGIAS: 0

## 2019-08-17 NOTE — [facility]
Infectious Disease Progress Note    Author: Rosmery Crabtree M.D. Date & Time of service: 8/17/2019  12:57 PM    Chief Complaint:  Sacral decubitus  Sacral osteomyelitis     Interval History:  69 y.o. WM with  C5- 7 complete quadriplegia admitted 7/24/2019 from [facility] on 7/24/2019 for wound check on his coccyx. Large decub to bone  7/26 AF WBC 5.9  seen with wound care-no surrounding cellulitis. Planned for CT and biopsy today  7/27 AF n IR declined to biopsy sacral bone. No fever or chills-had colostomy done this am  7/28 afebrile WBC 7.1 patient transferred to [facility] after colostomy creation yesterday.  He denies any abdominal pain.  Tolerating IV antibiotics.  7/30- AF, WBC 5.8, no issue with antibiotics, denies any pain, reports plastic surgeon wanting to hold off on flap at this time.  7/31-AF, WBC 5.9, tolerating antibiotics, WV in place to sacrum, denies any pain, agreeable to PICC placement for need of IV antibiotics x6 weeks.  8/9/2019 no fevers.  Creatinine is 0.59 Denies any pain or any new issues.  8/10/2019-no fevers.  No new issues overnight.  8/13/2019-no fevers.  Tolerating antibiotics without any issues.  8/16 AF WBC 4.8 ate all of breakfast-feels pretty good. Denies SE abx and pain controlled. Plan for surgery next week noted  8/17 AF tolerating abx well No new complaints  Review of Systems:  Review of Systems   Constitutional: Negative for chills and fever.   Respiratory: Negative for cough and shortness of breath.    Cardiovascular: Negative for chest pain.   Gastrointestinal: Negative for abdominal pain, nausea and vomiting.   Musculoskeletal: Negative for back pain, joint pain and myalgias.   All other systems reviewed and are negative.      Hemodynamics:  T98.6 /63 P75 RR 19 O2 sat 97%  Physical Exam:  Physical Exam   Constitutional: He is oriented to person, place, and time. No distress.   HENT:   Head: Normocephalic and atraumatic.   Mouth/Throat: No  oropharyngeal exudate.   Eyes: Right eye exhibits no discharge. Left eye exhibits no discharge.   Neck: Neck supple. No JVD present.   Cardiovascular: Normal rate.   Pulmonary/Chest: Effort normal. No stridor. No respiratory distress. He has no wheezes.   Abdominal: Soft. He exhibits no distension. There is no tenderness.   Colostomy present   Musculoskeletal: He exhibits no edema.   RUE PICC nontender   Neurological: He is alert and oriented to person, place, and time.   Paraplegia  sarcopenia   Skin: Skin is warm. He is not diaphoretic.   Large sacral ulcer, 5.5 X 2.2 X 3.2 cm   Psychiatric: He has a normal mood and affect. His behavior is normal.   Vitals reviewed.        Labs:  Recent Labs     08/16/19  0302   WBC 4.8   RBC 3.77*   HEMOGLOBIN 9.9*   HEMATOCRIT 33.0*   MCV 87.5   MCH 26.3*   RDW 72.9*   PLATELETCT 157*   MPV 8.3*     Recent Labs     08/16/19  0302   SODIUM 141   POTASSIUM 4.1   CHLORIDE 108   CO2 27   GLUCOSE 89   BUN 12     Recent Labs     08/16/19  0302   ALBUMIN 2.8*   TBILIRUBIN 0.6   ALKPHOSPHAT 58   TOTPROTEIN 5.6*   ALTSGPT 12   ASTSGOT 16   CREATININE 0.53       Imaging:  .    Dx-chest-portable (1 View)    Result Date: 7/24/2019 7/24/2019 11:57 AM HISTORY/REASON FOR EXAM:  Sepsis protocol. Atrial fibrillation. Hypertension. TECHNIQUE/EXAM DESCRIPTION AND NUMBER OF VIEWS: Single portable view of the chest. COMPARISON: None available. FINDINGS: The mediastinal and cardiac silhouette is enlarged. The pulmonary vascularity is within normal limits. The lung parenchyma is clear. There is no significant pleural effusion. There is no visible pneumothorax. There are postsurgical changes in the cervical thoracic spine. There are right lateral rib fractures, probably old.     1.  There is no acute cardiopulmonary process. 2.  There is an enlarged cardiac silhouette.      Micro:  Results     ** No results found for the last 168 hours. **        Assessment/Plan:  Sacral decubitis, Stage IV, on  treatment   Complicated by underlying sacral osteomyelitis, on treatment  Fluid collection, concern for abscess base of penis, on treatment  Afebrile  No leukocytosis  Wound culture polymicrobial (MRSA, Bfrag, Strep Constellatus/Milleri)  Blood cxs on 7/24 negative  CT on 7/26 w/ 3.8 cm deep decub ulcer extending to bone, presacral edema, 2 cm fluid collection at base of penis may be abscess  Refused MRI-OSH   XRAY + OM by report  s/p diverting colostomy on 7/27 by Dr. Hannah  IR unable to do a bone biopsy due to extent of wound  Dr. Moon planning on flap placement   Continue IV Vancomycin, ceftriaxone, flagyl (no Unasyn due to shortage)  Monitor renal function at least 3 times weekly and Vanco troughs  Continue IV antibiotics for 6 weeks-follow with PO if indicated  Stop date IV abx 9/4/19    Cervical quad  Impediment to offloading and healing

## 2019-08-18 LAB — VANCOMYCIN TROUGH SERPL-MCNC: 16.6 UG/ML (ref 10–20)

## 2019-08-18 PROCEDURE — 80202 ASSAY OF VANCOMYCIN: CPT

## 2019-08-18 ASSESSMENT — ENCOUNTER SYMPTOMS
FEVER: 0
VOMITING: 0
NAUSEA: 0
BACK PAIN: 0
MYALGIAS: 0
ABDOMINAL PAIN: 0
COUGH: 0
SHORTNESS OF BREATH: 0

## 2019-08-18 NOTE — [facility]
Infectious Disease Progress Note    Author: Rosmery Crabtree M.D. Date & Time of service: 8/18/2019  9:53 AM    Chief Complaint:  Sacral decubitus  Sacral osteomyelitis     Interval History:  69 y.o. WM with  C5- 7 complete quadriplegia admitted 7/24/2019 from [facility] on 7/24/2019 for wound check on his coccyx. Large decub to bone  7/26 AF WBC 5.9  seen with wound care-no surrounding cellulitis. Planned for CT and biopsy today  7/27 AF n IR declined to biopsy sacral bone. No fever or chills-had colostomy done this am  7/28 afebrile WBC 7.1 patient transferred to [facility] after colostomy creation yesterday.  He denies any abdominal pain.  Tolerating IV antibiotics.  7/30- AF, WBC 5.8, no issue with antibiotics, denies any pain, reports plastic surgeon wanting to hold off on flap at this time.  7/31-AF, WBC 5.9, tolerating antibiotics, WV in place to sacrum, denies any pain, agreeable to PICC placement for need of IV antibiotics x6 weeks.  8/9/2019 no fevers.  Creatinine is 0.59 Denies any pain or any new issues.  8/10/2019-no fevers.  No new issues overnight.  8/13/2019-no fevers.  Tolerating antibiotics without any issues.  8/16 AF WBC 4.8 ate all of breakfast-feels pretty good. Denies SE abx and pain controlled. Plan for surgery next week noted  8/18 AF no CBC -feels OK-NAD. Tolerating abx  Review of Systems:  Review of Systems   Constitutional: Negative for fever.   Respiratory: Negative for cough and shortness of breath.    Cardiovascular: Negative for chest pain.   Gastrointestinal: Negative for abdominal pain, nausea and vomiting.   Musculoskeletal: Negative for back pain, joint pain and myalgias.   Skin: Negative for rash.   All other systems reviewed and are negative.      Hemodynamics:  T97.6 /70 P78 RR 18 O2 sat 98%  Physical Exam:  Physical Exam   Constitutional: He is oriented to person, place, and time. No distress.   HENT:   Head: Normocephalic and atraumatic.    Mouth/Throat: No oropharyngeal exudate.   Eyes: Right eye exhibits no discharge. Left eye exhibits no discharge.   Neck: Neck supple. No JVD present.   Cardiovascular: Normal rate.   Pulmonary/Chest: Effort normal. No stridor. He has no rales.   Abdominal: Soft. There is no rebound and no guarding.   Colostomy present   Musculoskeletal: He exhibits no edema.   RUE PICC nontender   Neurological: He is alert and oriented to person, place, and time.   Paraplegia  sarcopenia   Skin: Skin is warm. He is not diaphoretic.   Large sacral ulcer, 5.5 X 2.2 X 3.2 cm   Psychiatric: He has a normal mood and affect. His behavior is normal.   Vitals reviewed.        Labs:  Recent Labs     08/16/19  0302   WBC 4.8   RBC 3.77*   HEMOGLOBIN 9.9*   HEMATOCRIT 33.0*   MCV 87.5   MCH 26.3*   RDW 72.9*   PLATELETCT 157*   MPV 8.3*     Recent Labs     08/16/19  0302   SODIUM 141   POTASSIUM 4.1   CHLORIDE 108   CO2 27   GLUCOSE 89   BUN 12     Recent Labs     08/16/19  0302   ALBUMIN 2.8*   TBILIRUBIN 0.6   ALKPHOSPHAT 58   TOTPROTEIN 5.6*   ALTSGPT 12   ASTSGOT 16   CREATININE 0.53       Imaging:  .    Dx-chest-portable (1 View)    Result Date: 7/24/2019 7/24/2019 11:57 AM HISTORY/REASON FOR EXAM:  Sepsis protocol. Atrial fibrillation. Hypertension. TECHNIQUE/EXAM DESCRIPTION AND NUMBER OF VIEWS: Single portable view of the chest. COMPARISON: None available. FINDINGS: The mediastinal and cardiac silhouette is enlarged. The pulmonary vascularity is within normal limits. The lung parenchyma is clear. There is no significant pleural effusion. There is no visible pneumothorax. There are postsurgical changes in the cervical thoracic spine. There are right lateral rib fractures, probably old.     1.  There is no acute cardiopulmonary process. 2.  There is an enlarged cardiac silhouette.      Micro:  Results     ** No results found for the last 168 hours. **        Assessment/Plan:  Sacral decubitis, Stage IV, on treatment   Complicated by  underlying sacral osteomyelitis, on treatment  Fluid collection, concern for abscess base of penis, on treatment  Afebrile  No leukocytosis  Wound culture polymicrobial (MRSA, Bfrag, Strep Constellatus/Milleri)  Blood cxs on 7/24 negative  CT on 7/26 w/ 3.8 cm deep decub ulcer extending to bone, presacral edema, 2 cm fluid collection at base of penis may be abscess  Refused MRI-OSH   XRAY + OM by report  s/p diverting colostomy on 7/27 by Dr. Hannah  IR unable to do a bone biopsy due to extent of wound  Dr. Moon planning on flap placement   Continue IV Vancomycin, ceftriaxone, flagyl (no Unasyn due to shortage)  Monitor renal function at least 3 times weekly and Vanco troughs  Continue IV antibiotics for 6 weeks-follow with PO if indicated  Stop date IV abx 9/4/19    Cervical quad  Impediment to offloading and healing     I have performed a physical exam and reviewed and updated ROS as of today.  In review of yesterday's note dated  8/17/2019, there are no changes except as documented above.

## 2019-08-19 LAB
ALBUMIN SERPL BCP-MCNC: 2.8 G/DL (ref 3.2–4.9)
ALBUMIN/GLOB SERPL: 0.9 G/DL
ALP SERPL-CCNC: 60 U/L (ref 30–99)
ALT SERPL-CCNC: 14 U/L (ref 2–50)
ANION GAP SERPL CALC-SCNC: 7 MMOL/L (ref 0–11.9)
AST SERPL-CCNC: 16 U/L (ref 12–45)
BILIRUB SERPL-MCNC: 0.4 MG/DL (ref 0.1–1.5)
BUN SERPL-MCNC: 12 MG/DL (ref 8–22)
CALCIUM SERPL-MCNC: 8.7 MG/DL (ref 8.4–10.2)
CHLORIDE SERPL-SCNC: 109 MMOL/L (ref 96–112)
CO2 SERPL-SCNC: 26 MMOL/L (ref 20–33)
CREAT SERPL-MCNC: 0.51 MG/DL (ref 0.5–1.4)
CRP SERPL HS-MCNC: 0.57 MG/DL (ref 0–0.75)
ERYTHROCYTE [DISTWIDTH] IN BLOOD BY AUTOMATED COUNT: 72.7 FL (ref 35.9–50)
ERYTHROCYTE [SEDIMENTATION RATE] IN BLOOD BY WESTERGREN METHOD: 30 MM/HOUR (ref 0–20)
GLOBULIN SER CALC-MCNC: 3 G/DL (ref 1.9–3.5)
GLUCOSE SERPL-MCNC: 91 MG/DL (ref 65–99)
HCT VFR BLD AUTO: 33.6 % (ref 42–52)
HGB BLD-MCNC: 10.3 G/DL (ref 14–18)
MAGNESIUM SERPL-MCNC: 1.9 MG/DL (ref 1.5–2.5)
MCH RBC QN AUTO: 26.5 PG (ref 27–33)
MCHC RBC AUTO-ENTMCNC: 30.7 G/DL (ref 33.7–35.3)
MCV RBC AUTO: 86.6 FL (ref 81.4–97.8)
PHOSPHATE SERPL-MCNC: 4.2 MG/DL (ref 2.5–4.5)
PLATELET # BLD AUTO: 163 K/UL (ref 164–446)
PMV BLD AUTO: 8.8 FL (ref 9–12.9)
POTASSIUM SERPL-SCNC: 4 MMOL/L (ref 3.6–5.5)
PROT SERPL-MCNC: 5.8 G/DL (ref 6–8.2)
RBC # BLD AUTO: 3.88 M/UL (ref 4.7–6.1)
SODIUM SERPL-SCNC: 142 MMOL/L (ref 135–145)
WBC # BLD AUTO: 4.8 K/UL (ref 4.8–10.8)

## 2019-08-19 PROCEDURE — 86140 C-REACTIVE PROTEIN: CPT

## 2019-08-19 PROCEDURE — 83735 ASSAY OF MAGNESIUM: CPT

## 2019-08-19 PROCEDURE — 85652 RBC SED RATE AUTOMATED: CPT

## 2019-08-19 PROCEDURE — 80053 COMPREHEN METABOLIC PANEL: CPT

## 2019-08-19 PROCEDURE — 84100 ASSAY OF PHOSPHORUS: CPT

## 2019-08-19 PROCEDURE — 85027 COMPLETE CBC AUTOMATED: CPT

## 2019-08-19 ASSESSMENT — ENCOUNTER SYMPTOMS: FEVER: 0

## 2019-08-19 NOTE — [facility]
Infectious Disease Progress Note    Author: Rosmery Crabtree M.D. Date & Time of service: 8/19/2019  1:28 PM    Chief Complaint:  Sacral decubitus  Sacral osteomyelitis     Interval History:  69 y.o. WM with  C5- 7 complete quadriplegia admitted 7/24/2019 from [facility] on 7/24/2019 for wound check on his coccyx. Large decub to bone  7/26 AF WBC 5.9  seen with wound care-no surrounding cellulitis. Planned for CT and biopsy today  7/27 AF n IR declined to biopsy sacral bone. No fever or chills-had colostomy done this am  7/28 afebrile WBC 7.1 patient transferred to [facility] after colostomy creation yesterday.  He denies any abdominal pain.  Tolerating IV antibiotics.  7/30- AF, WBC 5.8, no issue with antibiotics, denies any pain, reports plastic surgeon wanting to hold off on flap at this time.  7/31-AF, WBC 5.9, tolerating antibiotics, WV in place to sacrum, denies any pain, agreeable to PICC placement for need of IV antibiotics x6 weeks.  8/9/2019 no fevers.  Creatinine is 0.59 Denies any pain or any new issues.  8/10/2019-no fevers.  No new issues overnight.  8/13/2019-no fevers.  Tolerating antibiotics without any issues.  8/16 AF WBC 4.8 ate all of breakfast-feels pretty good. Denies SE abx and pain controlled. Plan for surgery next week noted  8/19 AF WBC sleeping soundly at time of rounds-NAD  Review of Systems:  Review of Systems   Unable to perform ROS: Other   Constitutional: Negative for fever.   Skin: Negative for rash.   All other systems reviewed and are negative.      Hemodynamics:  T98.3 /67 P74 RR 16 O2 sat 96%  Physical Exam:  Physical Exam   Constitutional: No distress.   HENT:   Head: Normocephalic and atraumatic.   Mouth/Throat: No oropharyngeal exudate.   Eyes: Right eye exhibits no discharge. Left eye exhibits no discharge.   Neck: Neck supple. No JVD present.   Cardiovascular: Normal rate.   Pulmonary/Chest: Effort normal. No stridor. No respiratory distress.    Abdominal: Soft. He exhibits no distension.   Colostomy present   Musculoskeletal: He exhibits no edema.   RUE PICC nontender   Neurological:   Paraplegia  sarcopenia   Skin: Skin is warm. He is not diaphoretic.   Large sacral ulcer, 5.5 X 2.2 X 3.2 cm-95% granulation at last check   Psychiatric: He has a normal mood and affect. His behavior is normal.   Vitals reviewed.        Labs:  Recent Labs     08/19/19 0313   WBC 4.8   RBC 3.88*   HEMOGLOBIN 10.3*   HEMATOCRIT 33.6*   MCV 86.6   MCH 26.5*   RDW 72.7*   PLATELETCT 163*   MPV 8.8*     Recent Labs     08/19/19 0313   SODIUM 142   POTASSIUM 4.0   CHLORIDE 109   CO2 26   GLUCOSE 91   BUN 12     Recent Labs     08/19/19 0313   ALBUMIN 2.8*   TBILIRUBIN 0.4   ALKPHOSPHAT 60   TOTPROTEIN 5.8*   ALTSGPT 14   ASTSGOT 16   CREATININE 0.51       Imaging:  .    Dx-chest-portable (1 View)    Result Date: 7/24/2019 7/24/2019 11:57 AM HISTORY/REASON FOR EXAM:  Sepsis protocol. Atrial fibrillation. Hypertension. TECHNIQUE/EXAM DESCRIPTION AND NUMBER OF VIEWS: Single portable view of the chest. COMPARISON: None available. FINDINGS: The mediastinal and cardiac silhouette is enlarged. The pulmonary vascularity is within normal limits. The lung parenchyma is clear. There is no significant pleural effusion. There is no visible pneumothorax. There are postsurgical changes in the cervical thoracic spine. There are right lateral rib fractures, probably old.     1.  There is no acute cardiopulmonary process. 2.  There is an enlarged cardiac silhouette.      Micro:  Results     ** No results found for the last 168 hours. **        Assessment/Plan:  Sacral decubitis, Stage IV, on treatment   Complicated by underlying sacral osteomyelitis, on treatment  Fluid collection, concern for abscess base of penis, on treatment  Afebrile  No leukocytosis  Wound culture polymicrobial (MRSA, Bfrag, Strep Constellatus/Milleri)  Blood cxs on 7/24 negative  CT on 7/26 w/ 3.8 cm deep decub ulcer  extending to bone, presacral edema, 2 cm fluid collection at base of penis may be abscess  Refused MRI-OSH   XRAY + OM by report  s/p diverting colostomy on 7/27 by Dr. Hannah  IR unable to do a bone biopsy due to extent of wound  Dr. Moon planning on flap placement   Continue IV Vancomycin, ceftriaxone, flagyl (no Unasyn due to shortage)  Monitor renal function at least 3 times weekly and Vanco troughs  Continue IV antibiotics for 6 weeks-follow with PO if indicated  Stop date IV abx 9/4/19    Cervical quad  Impediment to offloading and healing       I have performed a physical exam and reviewed and updated ROS as of today.  In review of yesterday's note dated  8/18/2019, there are no changes except as documented above.

## 2019-08-20 LAB
INR PPP: 1.26 (ref 0.87–1.13)
PROTHROMBIN TIME: 16 SEC (ref 12–14.6)

## 2019-08-20 PROCEDURE — 85610 PROTHROMBIN TIME: CPT

## 2019-08-20 RX ORDER — SODIUM HYPOCHLORITE 1.25 MG/ML
SOLUTION TOPICAL 2 TIMES DAILY
COMMUNITY
End: 2019-12-06

## 2019-08-20 RX ORDER — DOCUSATE SODIUM 100 MG/1
200 CAPSULE, LIQUID FILLED ORAL
Status: ON HOLD | COMMUNITY
End: 2023-12-20

## 2019-08-20 RX ORDER — METRONIDAZOLE 500 MG/1
500 TABLET ORAL 3 TIMES DAILY
COMMUNITY
End: 2019-12-06

## 2019-08-20 ASSESSMENT — ENCOUNTER SYMPTOMS
FEVER: 0
NAUSEA: 0
SHORTNESS OF BREATH: 0
VOMITING: 0
COUGH: 0

## 2019-08-20 NOTE — [facility]
Infectious Disease Progress Note    Author: Rosmery Crabtree M.D. Date & Time of service: 8/20/2019  10:55 AM    Chief Complaint:  Sacral decubitus  Sacral osteomyelitis     Interval History:  69 y.o. WM with  C5- 7 complete quadriplegia admitted 7/24/2019 from [facility] on 7/24/2019 for wound check on his coccyx. Large decub to bone  7/26 AF WBC 5.9  seen with wound care-no surrounding cellulitis. Planned for CT and biopsy today  7/27 AF n IR declined to biopsy sacral bone. No fever or chills-had colostomy done this am  7/28 afebrile WBC 7.1 patient transferred to [facility] after colostomy creation yesterday.  He denies any abdominal pain.  Tolerating IV antibiotics.  7/30- AF, WBC 5.8, no issue with antibiotics, denies any pain, reports plastic surgeon wanting to hold off on flap at this time.  7/31-AF, WBC 5.9, tolerating antibiotics, WV in place to sacrum, denies any pain, agreeable to PICC placement for need of IV antibiotics x6 weeks.  8/9/2019 no fevers.  Creatinine is 0.59 Denies any pain or any new issues.  8/10/2019-no fevers.  No new issues overnight.  8/13/2019-no fevers.  Tolerating antibiotics without any issues.  8/16 AF WBC 4.8 ate all of breakfast-feels pretty good. Denies SE abx and pain controlled. Plan for surgery next week noted  8/19 AF sleeping soundly at time of rounds-NAD  8/20/2019 AF WBC 4.8 up to chair-no problems with PICC ir abx-pain controlled. Plan for OR tomorrow  Review of Systems:  Review of Systems   Constitutional: Negative for fever.   HENT: Negative for congestion.    Respiratory: Negative for cough and shortness of breath.    Cardiovascular: Negative for chest pain.   Gastrointestinal: Negative for nausea and vomiting.   Skin: Negative for rash.   All other systems reviewed and are negative.      Hemodynamics:  T97.5 /90 P76 RR 18 O2 sat 97%  Physical Exam:  Physical Exam   Constitutional: He is oriented to person, place, and time. No distress.    HENT:   Head: Normocephalic and atraumatic.   Mouth/Throat: No oropharyngeal exudate.   Eyes: Conjunctivae are normal. No scleral icterus.   Neck: Neck supple. No JVD present.   Cardiovascular: Normal rate.   Pulmonary/Chest: Effort normal. No stridor. No respiratory distress. He has no wheezes. He has no rales.   Abdominal: Soft. There is no rebound and no guarding.   Colostomy present   Musculoskeletal: He exhibits no edema.   RUE PICC nontender   Neurological: He is alert and oriented to person, place, and time.   Paraplegia  sarcopenia   Skin: Skin is warm. He is not diaphoretic.   Large sacral ulcer, 5.5 X 2.2 X 3.2 cm-95% granulation at last check  ecchymosis   Psychiatric: He has a normal mood and affect. His behavior is normal.   Vitals reviewed.        Labs:  Recent Labs     08/19/19 0313   WBC 4.8   RBC 3.88*   HEMOGLOBIN 10.3*   HEMATOCRIT 33.6*   MCV 86.6   MCH 26.5*   RDW 72.7*   PLATELETCT 163*   MPV 8.8*     Recent Labs     08/19/19  0313   SODIUM 142   POTASSIUM 4.0   CHLORIDE 109   CO2 26   GLUCOSE 91   BUN 12     Recent Labs     08/19/19  0313   ALBUMIN 2.8*   TBILIRUBIN 0.4   ALKPHOSPHAT 60   TOTPROTEIN 5.8*   ALTSGPT 14   ASTSGOT 16   CREATININE 0.51       Imaging:  .    Dx-chest-portable (1 View)    Result Date: 7/24/2019 7/24/2019 11:57 AM HISTORY/REASON FOR EXAM:  Sepsis protocol. Atrial fibrillation. Hypertension. TECHNIQUE/EXAM DESCRIPTION AND NUMBER OF VIEWS: Single portable view of the chest. COMPARISON: None available. FINDINGS: The mediastinal and cardiac silhouette is enlarged. The pulmonary vascularity is within normal limits. The lung parenchyma is clear. There is no significant pleural effusion. There is no visible pneumothorax. There are postsurgical changes in the cervical thoracic spine. There are right lateral rib fractures, probably old.     1.  There is no acute cardiopulmonary process. 2.  There is an enlarged cardiac silhouette.      Micro:  Results     ** No results  found for the last 168 hours. **        Assessment/Plan:  Sacral decubitis, Stage IV, on treatment   Complicated by underlying sacral osteomyelitis, on treatment  Fluid collection, concern for abscess base of penis, on treatment  Afebrile  No leukocytosis  Wound culture polymicrobial (MRSA, Bfrag, Strep Constellatus/Milleri)  Blood cxs on 7/24 negative  CT on 7/26 w/ 3.8 cm deep decub ulcer extending to bone, presacral edema, 2 cm fluid collection at base of penis may be abscess  Refused MRI-OSH   XRAY + OM by report  s/p diverting colostomy on 7/27 by Dr. Hannah  IR unable to do a bone biopsy due to extent of wound  Plan for flap tomorrow  Continue IV Vancomycin, ceftriaxone, flagyl (no Unasyn due to shortage)  Monitor renal function at least 3 times weekly and Vanco troughs  Continue IV antibiotics for 6 weeks-follow with PO if indicated  Stop date IV abx 9/4/19    Cervical quad  Impediment to offloading and healing       I have performed a physical exam and reviewed and updated ROS as of today.  In review of yesterday's note dated  8/19/2019, there are no changes except as documented above.

## 2019-08-21 ENCOUNTER — ANESTHESIA (OUTPATIENT)
Dept: SURGERY | Facility: MEDICAL CENTER | Age: 69
End: 2019-08-21
Payer: COMMERCIAL

## 2019-08-21 ENCOUNTER — HOSPITAL ENCOUNTER (OUTPATIENT)
Facility: MEDICAL CENTER | Age: 69
End: 2019-09-17
Attending: HOSPITALIST | Admitting: HOSPITALIST
Payer: COMMERCIAL

## 2019-08-21 ENCOUNTER — ANESTHESIA EVENT (OUTPATIENT)
Dept: SURGERY | Facility: MEDICAL CENTER | Age: 69
End: 2019-08-21
Payer: COMMERCIAL

## 2019-08-21 ENCOUNTER — HOSPITAL ENCOUNTER (OUTPATIENT)
Facility: MEDICAL CENTER | Age: 69
End: 2019-08-21
Attending: SPECIALIST | Admitting: SPECIALIST
Payer: COMMERCIAL

## 2019-08-21 VITALS
DIASTOLIC BLOOD PRESSURE: 84 MMHG | TEMPERATURE: 98.1 F | OXYGEN SATURATION: 95 % | HEART RATE: 72 BPM | BODY MASS INDEX: 26.89 KG/M2 | RESPIRATION RATE: 16 BRPM | WEIGHT: 187.39 LBS | SYSTOLIC BLOOD PRESSURE: 159 MMHG

## 2019-08-21 PROCEDURE — 160038 HCHG SURGERY MINUTES - EA ADDL 1 MIN LEVEL 2: Performed by: SPECIALIST

## 2019-08-21 PROCEDURE — 87075 CULTR BACTERIA EXCEPT BLOOD: CPT

## 2019-08-21 PROCEDURE — 160048 HCHG OR STATISTICAL LEVEL 1-5: Performed by: SPECIALIST

## 2019-08-21 PROCEDURE — 87186 SC STD MICRODIL/AGAR DIL: CPT

## 2019-08-21 PROCEDURE — 500367 HCHG DRAIN KIT, HEMOVAC: Performed by: SPECIALIST

## 2019-08-21 PROCEDURE — 700101 HCHG RX REV CODE 250: Performed by: SPECIALIST

## 2019-08-21 PROCEDURE — 700101 HCHG RX REV CODE 250: Performed by: ANESTHESIOLOGY

## 2019-08-21 PROCEDURE — 700111 HCHG RX REV CODE 636 W/ 250 OVERRIDE (IP): Performed by: ANESTHESIOLOGY

## 2019-08-21 PROCEDURE — 88304 TISSUE EXAM BY PATHOLOGIST: CPT

## 2019-08-21 PROCEDURE — 87070 CULTURE OTHR SPECIMN AEROBIC: CPT

## 2019-08-21 PROCEDURE — 88311 DECALCIFY TISSUE: CPT

## 2019-08-21 PROCEDURE — 700105 HCHG RX REV CODE 258: Performed by: SPECIALIST

## 2019-08-21 PROCEDURE — 160002 HCHG RECOVERY MINUTES (STAT): Performed by: SPECIALIST

## 2019-08-21 PROCEDURE — 160027 HCHG SURGERY MINUTES - 1ST 30 MINS LEVEL 2: Performed by: SPECIALIST

## 2019-08-21 PROCEDURE — 87077 CULTURE AEROBIC IDENTIFY: CPT

## 2019-08-21 PROCEDURE — 88312 SPECIAL STAINS GROUP 1: CPT

## 2019-08-21 PROCEDURE — 160009 HCHG ANES TIME/MIN: Performed by: SPECIALIST

## 2019-08-21 PROCEDURE — 87205 SMEAR GRAM STAIN: CPT

## 2019-08-21 PROCEDURE — 87015 SPECIMEN INFECT AGNT CONCNTJ: CPT

## 2019-08-21 PROCEDURE — 501838 HCHG SUTURE GENERAL: Performed by: SPECIALIST

## 2019-08-21 PROCEDURE — 160035 HCHG PACU - 1ST 60 MINS PHASE I: Performed by: SPECIALIST

## 2019-08-21 RX ORDER — HYDROMORPHONE HYDROCHLORIDE 2 MG/ML
0.1 INJECTION, SOLUTION INTRAMUSCULAR; INTRAVENOUS; SUBCUTANEOUS
Status: DISCONTINUED | OUTPATIENT
Start: 2019-08-21 | End: 2019-08-21 | Stop reason: HOSPADM

## 2019-08-21 RX ORDER — ROCURONIUM BROMIDE 10 MG/ML
INJECTION, SOLUTION INTRAVENOUS PRN
Status: DISCONTINUED | OUTPATIENT
Start: 2019-08-21 | End: 2019-08-21 | Stop reason: SURG

## 2019-08-21 RX ORDER — HYDROMORPHONE HYDROCHLORIDE 2 MG/ML
0.4 INJECTION, SOLUTION INTRAMUSCULAR; INTRAVENOUS; SUBCUTANEOUS
Status: DISCONTINUED | OUTPATIENT
Start: 2019-08-21 | End: 2019-08-21 | Stop reason: HOSPADM

## 2019-08-21 RX ORDER — OXYCODONE HCL 5 MG/5 ML
10 SOLUTION, ORAL ORAL
Status: DISCONTINUED | OUTPATIENT
Start: 2019-08-21 | End: 2019-08-21 | Stop reason: HOSPADM

## 2019-08-21 RX ORDER — HYDRALAZINE HYDROCHLORIDE 20 MG/ML
5 INJECTION INTRAMUSCULAR; INTRAVENOUS
Status: DISCONTINUED | OUTPATIENT
Start: 2019-08-21 | End: 2019-08-21 | Stop reason: HOSPADM

## 2019-08-21 RX ORDER — BACITRACIN 50000 [IU]/1
INJECTION, POWDER, FOR SOLUTION INTRAMUSCULAR
Status: DISCONTINUED | OUTPATIENT
Start: 2019-08-21 | End: 2019-08-21 | Stop reason: HOSPADM

## 2019-08-21 RX ORDER — LIDOCAINE HYDROCHLORIDE 20 MG/ML
INJECTION, SOLUTION EPIDURAL; INFILTRATION; INTRACAUDAL; PERINEURAL PRN
Status: DISCONTINUED | OUTPATIENT
Start: 2019-08-21 | End: 2019-08-21 | Stop reason: SURG

## 2019-08-21 RX ORDER — DEXTROSE MONOHYDRATE, SODIUM CHLORIDE, AND POTASSIUM CHLORIDE 50; 1.49; 4.5 G/1000ML; G/1000ML; G/1000ML
INJECTION, SOLUTION INTRAVENOUS EVERY 6 HOURS
Status: DISCONTINUED | OUTPATIENT
Start: 2019-08-21 | End: 2019-08-21 | Stop reason: HOSPADM

## 2019-08-21 RX ORDER — SODIUM CHLORIDE, SODIUM LACTATE, POTASSIUM CHLORIDE, CALCIUM CHLORIDE 600; 310; 30; 20 MG/100ML; MG/100ML; MG/100ML; MG/100ML
INJECTION, SOLUTION INTRAVENOUS CONTINUOUS
Status: DISCONTINUED | OUTPATIENT
Start: 2019-08-21 | End: 2019-08-21 | Stop reason: HOSPADM

## 2019-08-21 RX ORDER — DIPHENHYDRAMINE HYDROCHLORIDE 50 MG/ML
25 INJECTION INTRAMUSCULAR; INTRAVENOUS EVERY 6 HOURS PRN
Status: DISCONTINUED | OUTPATIENT
Start: 2019-08-21 | End: 2019-08-21 | Stop reason: HOSPADM

## 2019-08-21 RX ORDER — DEXAMETHASONE SODIUM PHOSPHATE 4 MG/ML
4 INJECTION, SOLUTION INTRA-ARTICULAR; INTRALESIONAL; INTRAMUSCULAR; INTRAVENOUS; SOFT TISSUE
Status: DISCONTINUED | OUTPATIENT
Start: 2019-08-21 | End: 2019-08-21 | Stop reason: HOSPADM

## 2019-08-21 RX ORDER — LABETALOL HYDROCHLORIDE 5 MG/ML
5 INJECTION, SOLUTION INTRAVENOUS
Status: DISCONTINUED | OUTPATIENT
Start: 2019-08-21 | End: 2019-08-21 | Stop reason: HOSPADM

## 2019-08-21 RX ORDER — PHENYLEPHRINE HYDROCHLORIDE 10 MG/ML
INJECTION, SOLUTION INTRAMUSCULAR; INTRAVENOUS; SUBCUTANEOUS PRN
Status: DISCONTINUED | OUTPATIENT
Start: 2019-08-21 | End: 2019-08-21 | Stop reason: SURG

## 2019-08-21 RX ORDER — HALOPERIDOL 5 MG/ML
1 INJECTION INTRAMUSCULAR
Status: DISCONTINUED | OUTPATIENT
Start: 2019-08-21 | End: 2019-08-21 | Stop reason: HOSPADM

## 2019-08-21 RX ORDER — HALOPERIDOL 5 MG/ML
1 INJECTION INTRAMUSCULAR EVERY 6 HOURS PRN
Status: DISCONTINUED | OUTPATIENT
Start: 2019-08-21 | End: 2019-08-21 | Stop reason: HOSPADM

## 2019-08-21 RX ORDER — ACETAMINOPHEN 500 MG
1000 TABLET ORAL EVERY 6 HOURS
Status: DISCONTINUED | OUTPATIENT
Start: 2019-08-21 | End: 2019-08-21 | Stop reason: HOSPADM

## 2019-08-21 RX ORDER — MIDAZOLAM HYDROCHLORIDE 1 MG/ML
INJECTION INTRAMUSCULAR; INTRAVENOUS PRN
Status: DISCONTINUED | OUTPATIENT
Start: 2019-08-21 | End: 2019-08-21 | Stop reason: SURG

## 2019-08-21 RX ORDER — HYDROMORPHONE HYDROCHLORIDE 2 MG/ML
0.2 INJECTION, SOLUTION INTRAMUSCULAR; INTRAVENOUS; SUBCUTANEOUS
Status: DISCONTINUED | OUTPATIENT
Start: 2019-08-21 | End: 2019-08-21 | Stop reason: HOSPADM

## 2019-08-21 RX ORDER — SCOLOPAMINE TRANSDERMAL SYSTEM 1 MG/1
1 PATCH, EXTENDED RELEASE TRANSDERMAL
Status: DISCONTINUED | OUTPATIENT
Start: 2019-08-21 | End: 2019-08-21 | Stop reason: HOSPADM

## 2019-08-21 RX ORDER — DEXAMETHASONE SODIUM PHOSPHATE 4 MG/ML
INJECTION, SOLUTION INTRA-ARTICULAR; INTRALESIONAL; INTRAMUSCULAR; INTRAVENOUS; SOFT TISSUE PRN
Status: DISCONTINUED | OUTPATIENT
Start: 2019-08-21 | End: 2019-08-21 | Stop reason: SURG

## 2019-08-21 RX ORDER — DIPHENHYDRAMINE HYDROCHLORIDE 50 MG/ML
12.5 INJECTION INTRAMUSCULAR; INTRAVENOUS
Status: DISCONTINUED | OUTPATIENT
Start: 2019-08-21 | End: 2019-08-21 | Stop reason: HOSPADM

## 2019-08-21 RX ORDER — OXYCODONE HCL 5 MG/5 ML
5 SOLUTION, ORAL ORAL
Status: DISCONTINUED | OUTPATIENT
Start: 2019-08-21 | End: 2019-08-21 | Stop reason: HOSPADM

## 2019-08-21 RX ORDER — MEPERIDINE HYDROCHLORIDE 25 MG/ML
6.25 INJECTION INTRAMUSCULAR; INTRAVENOUS; SUBCUTANEOUS
Status: DISCONTINUED | OUTPATIENT
Start: 2019-08-21 | End: 2019-08-21 | Stop reason: HOSPADM

## 2019-08-21 RX ORDER — CEFAZOLIN SODIUM 1 G/3ML
INJECTION, POWDER, FOR SOLUTION INTRAMUSCULAR; INTRAVENOUS PRN
Status: DISCONTINUED | OUTPATIENT
Start: 2019-08-21 | End: 2019-08-21 | Stop reason: SURG

## 2019-08-21 RX ORDER — ONDANSETRON 2 MG/ML
4 INJECTION INTRAMUSCULAR; INTRAVENOUS EVERY 4 HOURS PRN
Status: DISCONTINUED | OUTPATIENT
Start: 2019-08-21 | End: 2019-08-21 | Stop reason: HOSPADM

## 2019-08-21 RX ORDER — ONDANSETRON 2 MG/ML
4 INJECTION INTRAMUSCULAR; INTRAVENOUS
Status: DISCONTINUED | OUTPATIENT
Start: 2019-08-21 | End: 2019-08-21 | Stop reason: HOSPADM

## 2019-08-21 RX ORDER — OXYCODONE HYDROCHLORIDE 5 MG/1
5 TABLET ORAL
Status: DISCONTINUED | OUTPATIENT
Start: 2019-08-21 | End: 2019-08-21 | Stop reason: HOSPADM

## 2019-08-21 RX ORDER — KETOROLAC TROMETHAMINE 30 MG/ML
15 INJECTION, SOLUTION INTRAMUSCULAR; INTRAVENOUS EVERY 6 HOURS
Status: DISCONTINUED | OUTPATIENT
Start: 2019-08-21 | End: 2019-08-21 | Stop reason: HOSPADM

## 2019-08-21 RX ADMIN — FENTANYL CITRATE 50 MCG: 50 INJECTION, SOLUTION INTRAMUSCULAR; INTRAVENOUS at 15:00

## 2019-08-21 RX ADMIN — ROCURONIUM BROMIDE 50 MG: 10 INJECTION, SOLUTION INTRAVENOUS at 13:48

## 2019-08-21 RX ADMIN — SODIUM CHLORIDE, POTASSIUM CHLORIDE, SODIUM LACTATE AND CALCIUM CHLORIDE: 600; 310; 30; 20 INJECTION, SOLUTION INTRAVENOUS at 12:14

## 2019-08-21 RX ADMIN — EPHEDRINE SULFATE 10 MG: 50 INJECTION INTRAMUSCULAR; INTRAVENOUS; SUBCUTANEOUS at 14:34

## 2019-08-21 RX ADMIN — ROCURONIUM BROMIDE 30 MG: 10 INJECTION, SOLUTION INTRAVENOUS at 14:33

## 2019-08-21 RX ADMIN — EPHEDRINE SULFATE 10 MG: 50 INJECTION INTRAMUSCULAR; INTRAVENOUS; SUBCUTANEOUS at 14:06

## 2019-08-21 RX ADMIN — FENTANYL CITRATE 50 MCG: 50 INJECTION, SOLUTION INTRAMUSCULAR; INTRAVENOUS at 15:04

## 2019-08-21 RX ADMIN — PHENYLEPHRINE HYDROCHLORIDE 100 MCG: 10 INJECTION INTRAVENOUS at 14:42

## 2019-08-21 RX ADMIN — SUGAMMADEX 200 MG: 100 INJECTION, SOLUTION INTRAVENOUS at 15:02

## 2019-08-21 RX ADMIN — DEXAMETHASONE SODIUM PHOSPHATE 4 MG: 4 INJECTION, SOLUTION INTRAMUSCULAR; INTRAVENOUS at 14:03

## 2019-08-21 RX ADMIN — MIDAZOLAM HYDROCHLORIDE 2 MG: 1 INJECTION, SOLUTION INTRAMUSCULAR; INTRAVENOUS at 13:45

## 2019-08-21 RX ADMIN — LIDOCAINE HYDROCHLORIDE 2.5 ML: 20 INJECTION, SOLUTION EPIDURAL; INFILTRATION; INTRACAUDAL; PERINEURAL at 13:48

## 2019-08-21 RX ADMIN — PROPOFOL 200 MG: 10 INJECTION, EMULSION INTRAVENOUS at 13:48

## 2019-08-21 RX ADMIN — EPHEDRINE SULFATE 10 MG: 50 INJECTION INTRAMUSCULAR; INTRAVENOUS; SUBCUTANEOUS at 14:38

## 2019-08-21 RX ADMIN — CEFAZOLIN 2 G: 1 INJECTION, POWDER, FOR SOLUTION INTRAVENOUS at 13:52

## 2019-08-21 ASSESSMENT — ENCOUNTER SYMPTOMS
FEVER: 0
FOCAL WEAKNESS: 1
VOMITING: 0
SHORTNESS OF BREATH: 0
COUGH: 0
NAUSEA: 0

## 2019-08-21 NOTE — OP REPORT
DATE OF SERVICE:  08/21/2019    PREOPERATIVE DIAGNOSIS:  Grade IV pressure ulcer of the sacrum with previous   osteomyelitis.    POSTOPERATIVE DIAGNOSIS:  Grade IV pressure ulcer of the sacrum with previous   osteomyelitis.    PROCEDURES PERFORMED:  1.  Debridement of grade IV pressure ulcer cavity of the sacrum.  2.  Debridement of sacral bone with bone biopsies.  3.  Three liter washout.  4.  Bilateral sliding gluteus myocutaneous flaps for ulcer coverage over a   Hemovac drain.    ESTIMATED BLOOD LOSS:  100 mL    ANESTHESIOLOGIST:  Dusty Raymond MD    INDICATIONS:  The patient had recently moved from [place] and presented to a   skilled nursing facility at that time.  He had a wound developing at the   skilled nursing [facility] and he was transferred back into acute care on   07/24/2019.  He had had a previous colostomy and he had multi organism   cultures from the posterior wound including bacteroides, methicillin resistant   Staphylococcus aureus and strep constellatus.  He has undergone dressing   changes on the wound care side with debridements and he is now ready for flap   closure.  The risks and benefits of flap closure included infection, hematoma,   bleeding, asymmetry, nerve injuries, and need for transfusion amongst other   complications including wound breakdown.  He understood and agreed to proceed.    He was consented preoperatively, evaluated.  He has mild anemia.  He also has   previous history of hypertension, neurogenic bladder, benign prostatic   hypertrophy, stage IV sacral decubitus ulcer and a C5-C6 incomplete   quadriplegia along with paroxysmal atrial fibrillation.  He has had multiple   surgeries years ago.  No other issues.  He denies any other major problems.    DESCRIPTION OF PROCEDURE:  After he had been brought to the operating room and   underwent general endotracheal anesthesia, he was turned into the prone   position, prepped and draped in sterile fashion, given perioperative  Ancef and   about 20 mL of 1% lidocaine, 1:100,000 epinephrine was infiltrated into the   gluteus muscles.  Once we had this done, we stained the entire ulcer cavity   with methylene blue and then we did a complete ulcerectomy of the entire wound   bed including the parasacral ligaments and bone.  Once we had the bone   exposed, we did a 3 L wash out and then did a deep bone culture of the tip of   the sacrum.  With the sacrectomy done from a biopsy, we then mobilized the   gluteus rogelio muscle from the midline off of the sacrum laterally and then   used a [place] elevator to gently dissect the myocutaneous flap allowing it to   mobilize towards the midline easily.  We did that for both the right and left   sides creating bilateral sliding myocutaneous flaps.  We then laid a Hemovac   drain of medium size over the sacral wound, brought out superiorly, set it in   with 2-0 nylon and then we did a very deep muscle closure using #1 Vicryl   sutures, about 12 sutures were used to approximate the muscle in the midline.    We had a beautiful watertight 1 inch thick muscle closure with no tension.    We then did a subcutaneous closure with 2-0 Vicryl and stapled this 15 cm long   incision with a stapler.  We placed Xeroform, antibiotic ointment, and a   Kerlix roll along with perforated gauze tape.  He was extubated in the room   and taken to recovery without incident.       ____________________________________     MD MINO TREJO / EMERSON    DD:  08/21/2019 15:29:08  DT:  08/21/2019 15:48:58    D#:  [id number]  Job#:  [id number]

## 2019-08-21 NOTE — ANESTHESIA PROCEDURE NOTES
Airway  Date/Time: 8/21/2019 1:48 PM  Performed by: Dusty Raymond M.D.  Authorized by: Dusty Raymond M.D.     Location:  OR  Urgency:  Elective  Indications for Airway Management:  Anesthesia  Spontaneous Ventilation: absent    Sedation Level:  Deep  Preoxygenated: Yes    Patient Position:  Sniffing  Mask Difficulty Assessment:  0 - not attempted  Final Airway Type:  Endotracheal airway  Final Endotracheal Airway:  ETT  Cuffed: Yes    Technique Used for Successful ETT Placement:  Direct laryngoscopy  Insertion Site:  Oral  Blade Type:  Tone  Laryngoscope Blade/Videolaryngoscope Blade Size:  3  ETT Size (mm):  7.0  Measured from:  Teeth  ETT to Teeth (cm):  22  Placement Verified by: auscultation and capnometry    Cormack-Lehane Classification:  Grade III - view of epiglottis only  Number of Attempts at Approach:  1

## 2019-08-21 NOTE — ANESTHESIA PREPROCEDURE EVALUATION
Relevant Problems   ANESTHESIA (within normal limits)      PULMONARY (within normal limits)      CARDIAC   (+) Essential hypertension   (+) Paroxysmal atrial flutter (HCC)      GI (within normal limits)       (within normal limits)      ENDO (within normal limits)      Other   (+) Osteomyelitis of coccyx (HCC)       Physical Exam    Airway   Mallampati: II  TM distance: >3 FB  Neck ROM: full       Cardiovascular - normal exam  Rhythm: regular  Rate: normal  (-) murmur     Dental - normal exam         Pulmonary - normal exam  Breath sounds clear to auscultation     Abdominal    Neurological - abnormal exam                 Anesthesia Plan    ASA 3   ASA physical status 3 criteria: other (comment)    Plan - general       Airway plan will be ETT        Induction: intravenous    Postoperative Plan: Postoperative administration of opioids is intended.    Pertinent diagnostic labs and testing reviewed    Informed Consent:    Anesthetic plan and risks discussed with patient.    Use of blood products discussed with: patient whom consented to blood products.

## 2019-08-21 NOTE — ANESTHESIA TIME REPORT
Anesthesia Start and Stop Event Times     Date Time Event    8/21/2019 1305 Ready for Procedure     1345 Anesthesia Start     1518 Anesthesia Stop        Responsible Staff  08/21/19    Name Role Begin End    Dusty Raymond M.D. Anesth 1345 1518        Preop Diagnosis (Free Text):  Pre-op Diagnosis     LEFT ISCHIAL GRADE 4 PRESSURE ULCER        Preop Diagnosis (Codes):    Post op Diagnosis  Sacral decubitus ulcer, stage IV (HCC)      Premium Reason  A. 3PM - 7AM    Comments:

## 2019-08-21 NOTE — OR NURSING
1515 arrived into pacu via gurney, sedated, no airway, resp unlabored and spontaneous, blow by o2 mask, drain compression in place with bright red scant blood. Dressing on sacral area c/d/i. No change from my pre op assess with M/S and neuro, hutchins with clear yellow urine, colostomy with dark mod amount stool, wafer and bag intact.   1525 aroused to verbal stim, o2 off as sats hi 90's, will reevaluate on room air. Pt denies pain or nausea. Able to grab the side bar and assist with rolling to side to evaluate sacral better, dressing remains c/d/i.   1535 pt awake alert , denies pain or nausea, cleaned up shira area and around hutchins and crevices with partially dried blood from procedure. Rolled the other side with patient assist, no drainage on dressing. Hutchins emptied 200 urine. Pt tolerating water without incident.  1550 remains awake, states he is ready to go back to his room, meets criteria for dc pacu.

## 2019-08-21 NOTE — ANESTHESIA QCDR
2019 [facility] Clinical Data Registry (for Quality Improvement)     Postoperative nausea/vomiting risk protocol (Adult = 18 yrs and Pediatric 3-17 yrs)- (430 and 463)  General inhalation anesthetic (NOT TIVA) with PONV risk factors: No  Provision of anti-emetic therapy with at least 2 different classes of agents: N/A  Patient DID NOT receive anti-emetic therapy and reason is documented in Medical Record: N/A    Multimodal Pain Management- (AQI59)  Patient undergoing Elective Surgery (i.e. Outpatient, or ASC, or Prescheduled Surgery prior to Hospital Admission): Yes  Use of Multimodal Pain Management, two or more drugs and/or interventions, NOT including systemic opioids: Yes   Exception: Documented allergy to multiple classes of analgesics:  N/A    PACU assessment of acute postoperative pain prior to Anesthesia Care End- Applies to Patients Age = 18- (ABG7)  Initial PACU pain score is which of the following: < 7/10  Patient unable to report pain score: N/A    Post-anesthetic transfer of care checklist/protocol to PACU/ICU- (426 and 427)  Upon conclusion of case, patient transferred to which of the following locations: PACU/Non-ICU  Use of transfer checklist/protocol: Yes  Exclusion: Service Performed in Patient Hospital Room (and thus did not require transfer): N/A    PACU Reintubation- (AQI31)  General anesthesia requiring endotracheal intubation (ETT) along with subsequent extubation in OR or PACU: Yes  Required reintubation in the PACU: No   Extubation was a planned trial documented in the medical record prior to removal of the original airway device:  N/A    Unplanned admission to ICU related to anesthesia service up through end of PACU care- (MD51)  Unplanned admission to ICU (not initially anticipated at anesthesia start time): No

## 2019-08-21 NOTE — OR NURSING
"Pt arrived from [facility], alert x 3, able to move upper arms  But not lower extrem. Noted several bruises, slighty faded lower extrem, noted dressing on posterior calf area and small abrasion anterior left lower leg, picc line rt upper arm dressing c/d/i. Colostomy with dark minimal stool, hutchins to DD with clear yellow urine, did not assess sacral area per pt request and noted having \"surgery there anyway'   "

## 2019-08-22 LAB
ANION GAP SERPL CALC-SCNC: 5 MMOL/L (ref 0–11.9)
BUN SERPL-MCNC: 17 MG/DL (ref 8–22)
CALCIUM SERPL-MCNC: 8.6 MG/DL (ref 8.4–10.2)
CHLORIDE SERPL-SCNC: 102 MMOL/L (ref 96–112)
CO2 SERPL-SCNC: 26 MMOL/L (ref 20–33)
CREAT SERPL-MCNC: 0.52 MG/DL (ref 0.5–1.4)
GLUCOSE SERPL-MCNC: 114 MG/DL (ref 65–99)
GRAM STN SPEC: NORMAL
POTASSIUM SERPL-SCNC: 4 MMOL/L (ref 3.6–5.5)
SIGNIFICANT IND 70042: NORMAL
SITE SITE: NORMAL
SODIUM SERPL-SCNC: 133 MMOL/L (ref 135–145)
SOURCE SOURCE: NORMAL
VANCOMYCIN TROUGH SERPL-MCNC: 17.9 UG/ML (ref 10–20)

## 2019-08-22 PROCEDURE — 80202 ASSAY OF VANCOMYCIN: CPT

## 2019-08-22 PROCEDURE — 80048 BASIC METABOLIC PNL TOTAL CA: CPT

## 2019-08-22 ASSESSMENT — ENCOUNTER SYMPTOMS
FOCAL WEAKNESS: 1
FEVER: 0

## 2019-08-22 ASSESSMENT — PAIN SCALES - GENERAL: PAIN_LEVEL: 0

## 2019-08-22 NOTE — ANESTHESIA POSTPROCEDURE EVALUATION
Patient: Osvaldo Pate    Procedure Summary     Date:  08/21/19 Room / Location:   OR  / SURGERY [facility]    Anesthesia Start:  1345 Anesthesia Stop:  1518    Procedures:       debridement of Sacral grade 4 ulcer - W/BONE BIOPSY, 3 liter wash out. bilateral sliding gluteal myocutaneous flap advancement (N/A Buttocks)      CLOSURE, FLAP - MUSCLE Diagnosis:  (LEFT ISCHIAL GRADE 4 PRESSURE ULCER)    Surgeon:  Amadeo Moon M.D. Responsible Provider:  Dusty Raymond M.D.    Anesthesia Type:  general ASA Status:  3          Final Anesthesia Type: general  Last vitals  BP   Blood Pressure : 159/84    Temp   36.7 °C (98.1 °F)    Pulse   Pulse: 72, Heart Rate (Monitored): 88   Resp   16    SpO2   95 %      Anesthesia Post Evaluation    Patient location during evaluation: PACU  Patient participation: complete - patient participated  Level of consciousness: awake and alert  Pain score: 0    Airway patency: patent  Anesthetic complications: no  Cardiovascular status: hemodynamically stable  Respiratory status: acceptable  Hydration status: euvolemic    PONV: none           Nurse Pain Score: 0 (NPRS)

## 2019-08-22 NOTE — [facility]
Infectious Disease Progress Note    Author: Rosmery Crabtree M.D. Date & Time of service: 8/22/2019  11:24 AM    Chief Complaint:  Sacral decubitus  Sacral osteomyelitis     Interval History:  69 y.o. WM with  C5- 7 complete quadriplegia admitted 7/24/2019 from [facility] on 7/24/2019 for wound check on his coccyx. Large decub to bone  7/26 AF WBC 5.9  seen with wound care-no surrounding cellulitis. Planned for CT and biopsy today  7/27 AF n IR declined to biopsy sacral bone. No fever or chills-had colostomy done this am  7/28 afebrile WBC 7.1 patient transferred to [facility] after colostomy creation yesterday.  He denies any abdominal pain.  Tolerating IV antibiotics.  7/30- AF, WBC 5.8, no issue with antibiotics, denies any pain, reports plastic surgeon wanting to hold off on flap at this time.  7/31-AF, WBC 5.9, tolerating antibiotics, WV in place to sacrum, denies any pain, agreeable to PICC placement for need of IV antibiotics x6 weeks.  8/9/2019 no fevers.  Creatinine is 0.59 Denies any pain or any new issues.  8/10/2019-no fevers.  No new issues overnight.  8/13/2019-no fevers.  Tolerating antibiotics without any issues.  8/16 AF WBC 4.8 ate all of breakfast-feels pretty good. Denies SE abx and pain controlled. Plan for surgery next week noted  8/19 AF sleeping soundly at time of rounds-NAD  8/20/2019 AF WBC 4.8 up to chair-no problems with PICC ir abx-pain controlled. Plan for OR tomorrow  8/21 AF plan for OR today-no new complaints  8/22 AF sleeping soundly at time of rounds-tolerated procedure well  Review of Systems:  Review of Systems   Constitutional: Negative for fever.   Skin: Negative for rash.   Neurological: Positive for focal weakness.   All other systems reviewed and are negative.      Hemodynamics:  P72 RR 16  Physical Exam:  Physical Exam   Constitutional: No distress.   Eyes: Conjunctivae are normal. Right eye exhibits no discharge. Left eye exhibits no discharge.    Neck: No JVD present.   Cardiovascular: Normal rate.   Pulmonary/Chest: Effort normal. No stridor. No respiratory distress.   Abdominal: Soft. He exhibits no distension. There is no tenderness.   Colostomy present   Musculoskeletal: He exhibits no edema.   RUE PICC nontender   Neurological:   Paraplegia  sarcopenia  sleeping   Skin: Skin is warm. He is not diaphoretic.   Sacrum dressed   Vitals reviewed.        Labs:  No results for input(s): WBC, RBC, HEMOGLOBIN, HEMATOCRIT, MCV, MCH, RDW, PLATELETCT, MPV, NEUTSPOLYS, LYMPHOCYTES, MONOCYTES, EOSINOPHILS, BASOPHILS, RBCMORPHOLO in the last 72 hours.  Recent Labs     08/22/19  0837   SODIUM 133*   POTASSIUM 4.0   CHLORIDE 102   CO2 26   GLUCOSE 114*   BUN 17     Recent Labs     08/22/19  0837   CREATININE 0.52       Imaging:  .    Dx-chest-portable (1 View)    Result Date: 7/24/2019 7/24/2019 11:57 AM HISTORY/REASON FOR EXAM:  Sepsis protocol. Atrial fibrillation. Hypertension. TECHNIQUE/EXAM DESCRIPTION AND NUMBER OF VIEWS: Single portable view of the chest. COMPARISON: None available. FINDINGS: The mediastinal and cardiac silhouette is enlarged. The pulmonary vascularity is within normal limits. The lung parenchyma is clear. There is no significant pleural effusion. There is no visible pneumothorax. There are postsurgical changes in the cervical thoracic spine. There are right lateral rib fractures, probably old.     1.  There is no acute cardiopulmonary process. 2.  There is an enlarged cardiac silhouette.      Micro:  Results     ** No results found for the last 168 hours. **        Assessment/Plan:  Sacral decubitis, Stage IV, on treatment   Complicated by underlying sacral osteomyelitis, on treatment  Fluid collection, concern for abscess base of penis, on treatment  Afebrile  No leukocytosis  Wound culture polymicrobial (MRSA, Bfrag, Strep Constellatus/Milleri)  Blood cxs on 7/24 negative  CT on 7/26 w/ 3.8 cm deep decub ulcer extending to bone, presacral  edema, 2 cm fluid collection at base of penis may be abscess  Refused MRI-OSH   XRAY + OM by report  s/p diverting colostomy on 7/27 by Dr. Hannah  S/p debridement of grade IV pressure ulcer cavity of the sacrum, debridement of sacral bone with bone biopsies, bilateral sliding gluteus myocutaneous flaps for ulcer coverage 8/21/2019  FU cx and biopsy results  Continue IV Vancomycin, ceftriaxone, flagyl (no Unasyn due to shortage)  Monitor renal function at least 3 times weekly and Vanco troughs  Continue IV antibiotics for 6 weeks-follow with PO if indicated  Stop date IV abx 9/4/19    Cervical quad  Impediment to offloading and healing

## 2019-08-23 LAB
ALBUMIN SERPL BCP-MCNC: 2.9 G/DL (ref 3.2–4.9)
ALBUMIN/GLOB SERPL: 1.2 G/DL
ALP SERPL-CCNC: 63 U/L (ref 30–99)
ALT SERPL-CCNC: 10 U/L (ref 2–50)
ANION GAP SERPL CALC-SCNC: 7 MMOL/L (ref 0–11.9)
AST SERPL-CCNC: 11 U/L (ref 12–45)
BILIRUB SERPL-MCNC: 0.2 MG/DL (ref 0.1–1.5)
BUN SERPL-MCNC: 15 MG/DL (ref 8–22)
CALCIUM SERPL-MCNC: 8.6 MG/DL (ref 8.4–10.2)
CHLORIDE SERPL-SCNC: 110 MMOL/L (ref 96–112)
CO2 SERPL-SCNC: 27 MMOL/L (ref 20–33)
CREAT SERPL-MCNC: 0.47 MG/DL (ref 0.5–1.4)
ERYTHROCYTE [DISTWIDTH] IN BLOOD BY AUTOMATED COUNT: 74.9 FL (ref 35.9–50)
GLOBULIN SER CALC-MCNC: 2.5 G/DL (ref 1.9–3.5)
GLUCOSE SERPL-MCNC: 92 MG/DL (ref 65–99)
HCT VFR BLD AUTO: 27.7 % (ref 42–52)
HGB BLD-MCNC: 8.4 G/DL (ref 14–18)
MAGNESIUM SERPL-MCNC: 1.9 MG/DL (ref 1.5–2.5)
MCH RBC QN AUTO: 27.1 PG (ref 27–33)
MCHC RBC AUTO-ENTMCNC: 30.3 G/DL (ref 33.7–35.3)
MCV RBC AUTO: 89.4 FL (ref 81.4–97.8)
PATHOLOGY CONSULT NOTE: NORMAL
PHOSPHATE SERPL-MCNC: 3.4 MG/DL (ref 2.5–4.5)
PLATELET # BLD AUTO: 143 K/UL (ref 164–446)
PMV BLD AUTO: 8.8 FL (ref 9–12.9)
POTASSIUM SERPL-SCNC: 4.6 MMOL/L (ref 3.6–5.5)
PROT SERPL-MCNC: 5.4 G/DL (ref 6–8.2)
RBC # BLD AUTO: 3.1 M/UL (ref 4.7–6.1)
SODIUM SERPL-SCNC: 144 MMOL/L (ref 135–145)
WBC # BLD AUTO: 5 K/UL (ref 4.8–10.8)

## 2019-08-23 PROCEDURE — 83735 ASSAY OF MAGNESIUM: CPT

## 2019-08-23 PROCEDURE — 85027 COMPLETE CBC AUTOMATED: CPT

## 2019-08-23 PROCEDURE — 80053 COMPREHEN METABOLIC PANEL: CPT

## 2019-08-23 PROCEDURE — 84100 ASSAY OF PHOSPHORUS: CPT

## 2019-08-23 ASSESSMENT — ENCOUNTER SYMPTOMS
DIARRHEA: 0
NAUSEA: 0
VOMITING: 0
ABDOMINAL PAIN: 0
FEVER: 0
FOCAL WEAKNESS: 1

## 2019-08-23 NOTE — [facility]
Infectious Disease Progress Note    Author: Manuel Valencia M.D. Date & Time of service: 8/23/2019  10:04 AM    Chief Complaint:  Sacral decubitus  Sacral osteomyelitis     Interval History:  69 y.o. WM with  C5- 7 complete quadriplegia admitted 7/24/2019 from [facility] on 7/24/2019 for wound check on his coccyx. Large decub to bone  7/26 AF WBC 5.9  seen with wound care-no surrounding cellulitis. Planned for CT and biopsy today  7/27 AF n IR declined to biopsy sacral bone. No fever or chills-had colostomy done this am  7/28 afebrile WBC 7.1 patient transferred to [facility] after colostomy creation yesterday.  He denies any abdominal pain.  Tolerating IV antibiotics.  7/30- AF, WBC 5.8, no issue with antibiotics, denies any pain, reports plastic surgeon wanting to hold off on flap at this time.  7/31-AF, WBC 5.9, tolerating antibiotics, WV in place to sacrum, denies any pain, agreeable to PICC placement for need of IV antibiotics x6 weeks.  8/9/2019 no fevers.  Creatinine is 0.59 Denies any pain or any new issues.  8/10/2019-no fevers.  No new issues overnight.  8/13/2019-no fevers.  Tolerating antibiotics without any issues.  8/16 AF WBC 4.8 ate all of breakfast-feels pretty good. Denies SE abx and pain controlled. Plan for surgery next week noted  8/19 AF sleeping soundly at time of rounds-NAD  8/20/2019 AF WBC 4.8 up to chair-no problems with PICC ir abx-pain controlled. Plan for OR tomorrow  8/21 AF plan for OR today-no new complaints  8/22 AF sleeping soundly at time of rounds-tolerated procedure well  8/23 afebrile, white count 5000.  Tolerating antibiotics, no new issues.    Review of Systems:  Review of Systems   Constitutional: Negative for fever.   Gastrointestinal: Negative for abdominal pain, diarrhea, nausea and vomiting.   Skin: Negative for itching and rash.   Neurological: Positive for focal weakness.   All other systems reviewed and are negative.      Hemodynamics:  T 98.0 RR  16 HR 70 /66 O2 98%  Physical Exam:  Physical Exam   Constitutional: He is oriented to person, place, and time. He appears well-developed. No distress.   HENT:   Mouth/Throat: Oropharynx is clear and moist.   Eyes: Conjunctivae are normal. Right eye exhibits no discharge. Left eye exhibits no discharge. No scleral icterus.   Neck: No JVD present.   Cardiovascular: Normal rate, regular rhythm and normal heart sounds.   Pulmonary/Chest: Effort normal. No stridor. No respiratory distress. He has no wheezes.   Abdominal: Soft. He exhibits no distension. There is no tenderness.   Colostomy present   Musculoskeletal: He exhibits no edema.   RUE PICC nontender   Neurological: He is alert and oriented to person, place, and time.   Paraplegia  sarcopenia   Skin: Skin is warm. He is not diaphoretic. No erythema.   Sacrum dressed   Psychiatric: He has a normal mood and affect. His behavior is normal.   Very pleasant   Vitals reviewed.        Labs:  Recent Labs     08/23/19  0233   WBC 5.0   RBC 3.10*   HEMOGLOBIN 8.4*   HEMATOCRIT 27.7*   MCV 89.4   MCH 27.1   RDW 74.9*   PLATELETCT 143*   MPV 8.8*     Recent Labs     08/22/19  0837 08/23/19  0233   SODIUM 133* 144   POTASSIUM 4.0 4.6   CHLORIDE 102 110   CO2 26 27   GLUCOSE 114* 92   BUN 17 15     Recent Labs     08/22/19  0837 08/23/19  0233   ALBUMIN  --  2.9*   TBILIRUBIN  --  0.2   ALKPHOSPHAT  --  63   TOTPROTEIN  --  5.4*   ALTSGPT  --  10   ASTSGOT  --  11*   CREATININE 0.52 0.47*       Imaging:  .    Dx-chest-portable (1 View)    Result Date: 7/24/2019 7/24/2019 11:57 AM HISTORY/REASON FOR EXAM:  Sepsis protocol. Atrial fibrillation. Hypertension. TECHNIQUE/EXAM DESCRIPTION AND NUMBER OF VIEWS: Single portable view of the chest. COMPARISON: None available. FINDINGS: The mediastinal and cardiac silhouette is enlarged. The pulmonary vascularity is within normal limits. The lung parenchyma is clear. There is no significant pleural effusion. There is no visible  pneumothorax. There are postsurgical changes in the cervical thoracic spine. There are right lateral rib fractures, probably old.     1.  There is no acute cardiopulmonary process. 2.  There is an enlarged cardiac silhouette.      Micro:  Results     ** No results found for the last 168 hours. **        Assessment/Plan:  Sacral decubitis, Stage IV, on treatment   Complicated by underlying sacral osteomyelitis, on treatment  Fluid collection, concern for abscess base of penis, on treatment  Afebrile  No leukocytosis  Wound culture polymicrobial (MRSA, Bfrag, Strep Constellatus/Milleri)  Blood cxs on 7/24 negative  CT on 7/26 w/ 3.8 cm deep decub ulcer extending to bone, presacral edema, 2 cm fluid collection at base of penis may be abscess  Refused MRI-OSH   XRAY + OM by report  s/p diverting colostomy on 7/27 by Dr. Hannah  S/p debridement of grade IV pressure ulcer cavity of the sacrum, debridement of sacral bone with bone biopsies, bilateral sliding gluteus myocutaneous flaps for ulcer coverage 8/21/2019  FU cx and biopsy results.  There does not appear to have been a biopsy specimen sent to pathology.  Discussed with micro lab.  They do have extra specimen, order placed for pathology  Continue IV Vancomycin, ceftriaxone 2 g every 24 hours, p.o. Flagyl 500 mg every 8 hours (no Unasyn due to shortage)  Monitor renal function at least 3 times weekly and Vanco troughs  Last vancomycin trough of 17.9 on 8/22  Continue IV antibiotics for 6 weeks-follow with PO if indicated  8/20 wound care pictures noted -scant slough, no odor or s/s of infection.  Await wound care reassessment after flap placement  Stop date IV abx 9/4/19    Cervical quad  Likely etiology of above  Impediment to offloading and healing     Discussed with primary, Dr. Cisneros.  ID will follow.  Please call with questions.

## 2019-08-25 LAB
ANION GAP SERPL CALC-SCNC: 6 MMOL/L (ref 0–11.9)
BACTERIA SPEC ANAEROBE CULT: NORMAL
BACTERIA TISS AEROBE CULT: ABNORMAL
BACTERIA TISS AEROBE CULT: ABNORMAL
BUN SERPL-MCNC: 12 MG/DL (ref 8–22)
CALCIUM SERPL-MCNC: 8.6 MG/DL (ref 8.4–10.2)
CHLORIDE SERPL-SCNC: 107 MMOL/L (ref 96–112)
CO2 SERPL-SCNC: 26 MMOL/L (ref 20–33)
CREAT SERPL-MCNC: 0.41 MG/DL (ref 0.5–1.4)
GLUCOSE SERPL-MCNC: 89 MG/DL (ref 65–99)
GRAM STN SPEC: ABNORMAL
POTASSIUM SERPL-SCNC: 4.2 MMOL/L (ref 3.6–5.5)
SIGNIFICANT IND 70042: ABNORMAL
SIGNIFICANT IND 70042: NORMAL
SITE SITE: ABNORMAL
SITE SITE: NORMAL
SODIUM SERPL-SCNC: 139 MMOL/L (ref 135–145)
SOURCE SOURCE: ABNORMAL
SOURCE SOURCE: NORMAL
VANCOMYCIN TROUGH SERPL-MCNC: 15.9 UG/ML (ref 10–20)

## 2019-08-25 PROCEDURE — 80048 BASIC METABOLIC PNL TOTAL CA: CPT

## 2019-08-25 PROCEDURE — 80202 ASSAY OF VANCOMYCIN: CPT

## 2019-08-26 LAB
ALBUMIN SERPL BCP-MCNC: 2.9 G/DL (ref 3.2–4.9)
ALBUMIN/GLOB SERPL: 1 G/DL
ALP SERPL-CCNC: 62 U/L (ref 30–99)
ALT SERPL-CCNC: 11 U/L (ref 2–50)
ANION GAP SERPL CALC-SCNC: 6 MMOL/L (ref 0–11.9)
AST SERPL-CCNC: 14 U/L (ref 12–45)
BILIRUB SERPL-MCNC: 0.2 MG/DL (ref 0.1–1.5)
BUN SERPL-MCNC: 16 MG/DL (ref 8–22)
CALCIUM SERPL-MCNC: 8.8 MG/DL (ref 8.4–10.2)
CHLORIDE SERPL-SCNC: 108 MMOL/L (ref 96–112)
CO2 SERPL-SCNC: 27 MMOL/L (ref 20–33)
CREAT SERPL-MCNC: 0.55 MG/DL (ref 0.5–1.4)
CRP SERPL HS-MCNC: 25.7 MG/L (ref 0–7.5)
ERYTHROCYTE [DISTWIDTH] IN BLOOD BY AUTOMATED COUNT: 73.9 FL (ref 35.9–50)
ERYTHROCYTE [SEDIMENTATION RATE] IN BLOOD BY WESTERGREN METHOD: 32 MM/HOUR (ref 0–20)
GLOBULIN SER CALC-MCNC: 2.8 G/DL (ref 1.9–3.5)
GLUCOSE SERPL-MCNC: 93 MG/DL (ref 65–99)
HCT VFR BLD AUTO: 28.5 % (ref 42–52)
HGB BLD-MCNC: 8.6 G/DL (ref 14–18)
MCH RBC QN AUTO: 27.1 PG (ref 27–33)
MCHC RBC AUTO-ENTMCNC: 30.2 G/DL (ref 33.7–35.3)
MCV RBC AUTO: 89.9 FL (ref 81.4–97.8)
PLATELET # BLD AUTO: 190 K/UL (ref 164–446)
PMV BLD AUTO: 9 FL (ref 9–12.9)
POTASSIUM SERPL-SCNC: 4.5 MMOL/L (ref 3.6–5.5)
PROT SERPL-MCNC: 5.7 G/DL (ref 6–8.2)
RBC # BLD AUTO: 3.17 M/UL (ref 4.7–6.1)
SODIUM SERPL-SCNC: 141 MMOL/L (ref 135–145)
WBC # BLD AUTO: 4.9 K/UL (ref 4.8–10.8)

## 2019-08-26 PROCEDURE — 85027 COMPLETE CBC AUTOMATED: CPT

## 2019-08-26 PROCEDURE — 80053 COMPREHEN METABOLIC PANEL: CPT

## 2019-08-26 PROCEDURE — 86141 C-REACTIVE PROTEIN HS: CPT

## 2019-08-26 PROCEDURE — 85652 RBC SED RATE AUTOMATED: CPT

## 2019-08-28 LAB — VANCOMYCIN TROUGH SERPL-MCNC: 16.1 UG/ML (ref 10–20)

## 2019-08-28 PROCEDURE — 80202 ASSAY OF VANCOMYCIN: CPT

## 2019-08-28 ASSESSMENT — ENCOUNTER SYMPTOMS
FOCAL WEAKNESS: 1
DIARRHEA: 0
ABDOMINAL PAIN: 0
FEVER: 0
NAUSEA: 0
VOMITING: 0

## 2019-08-28 NOTE — [facility]
Infectious Disease Progress Note    Author: Manuel Valencia M.D. Date & Time of service: 8/28/2019  9:38 AM    Chief Complaint:  Sacral decubitus  Sacral osteomyelitis     Interval History:  69 y.o. WM with  C5- 7 complete quadriplegia admitted 7/24/2019 from [facility] on 7/24/2019 for wound check on his coccyx. Large decub to bone  7/26 AF WBC 5.9  seen with wound care-no surrounding cellulitis. Planned for CT and biopsy today  7/27 AF n IR declined to biopsy sacral bone. No fever or chills-had colostomy done this am  7/28 afebrile WBC 7.1 patient transferred to [facility] after colostomy creation yesterday.  He denies any abdominal pain.  Tolerating IV antibiotics.  7/30- AF, WBC 5.8, no issue with antibiotics, denies any pain, reports plastic surgeon wanting to hold off on flap at this time.  7/31-AF, WBC 5.9, tolerating antibiotics, WV in place to sacrum, denies any pain, agreeable to PICC placement for need of IV antibiotics x6 weeks.  8/9/2019 no fevers.  Creatinine is 0.59 Denies any pain or any new issues.  8/10/2019-no fevers.  No new issues overnight.  8/13/2019-no fevers.  Tolerating antibiotics without any issues.  8/16 AF WBC 4.8 ate all of breakfast-feels pretty good. Denies SE abx and pain controlled. Plan for surgery next week noted  8/19 AF sleeping soundly at time of rounds-NAD  8/20/2019 AF WBC 4.8 up to chair-no problems with PICC ir abx-pain controlled. Plan for OR tomorrow  8/21 AF plan for OR today-no new complaints  8/22 AF sleeping soundly at time of rounds-tolerated procedure well  8/23 afebrile, white count 5000.  Tolerating antibiotics, no new issues.  8/28 Afebrile, white count 4.9.  Tolerating antibiotics, no new issues.  Bone pathology from 8/21 debridement consistent with acute osteomyelitis.     Review of Systems:  Review of Systems   Constitutional: Negative for fever.   Gastrointestinal: Negative for abdominal pain, diarrhea, nausea and vomiting.   Skin:  Negative for itching and rash.   Neurological: Positive for focal weakness.   All other systems reviewed and are negative.    Hemodynamics:  T 98.6 /60 HR 88 RR 18 O2 100%  Physical Exam:  Physical Exam   Constitutional: He is oriented to person, place, and time. He appears well-developed. No distress.   HENT:   Mouth/Throat: Oropharynx is clear and moist.   Eyes: Conjunctivae are normal. Right eye exhibits no discharge. Left eye exhibits no discharge. No scleral icterus.   Neck: No JVD present.   Cardiovascular: Normal rate, regular rhythm and normal heart sounds.   Pulmonary/Chest: Effort normal. No stridor. No respiratory distress. He has no wheezes.   Abdominal: Soft. He exhibits no distension. There is no tenderness.   Colostomy present   Musculoskeletal: He exhibits no edema.   RUE PICC nontender   Neurological: He is alert and oriented to person, place, and time.   Paraplegia  sarcopenia   Skin: Skin is warm. He is not diaphoretic. No erythema.   Sacrum dressed   Psychiatric: He has a normal mood and affect. His behavior is normal.   Very pleasant   Vitals reviewed.  No change compared to 8/23      Labs:  Recent Labs     08/26/19  0243   WBC 4.9   RBC 3.17*   HEMOGLOBIN 8.6*   HEMATOCRIT 28.5*   MCV 89.9   MCH 27.1   RDW 73.9*   PLATELETCT 190   MPV 9.0     Recent Labs     08/26/19  0243   SODIUM 141   POTASSIUM 4.5   CHLORIDE 108   CO2 27   GLUCOSE 93   BUN 16     Recent Labs     08/26/19  0243   ALBUMIN 2.9*   TBILIRUBIN 0.2   ALKPHOSPHAT 62   TOTPROTEIN 5.7*   ALTSGPT 11   ASTSGOT 14   CREATININE 0.55       Imaging:  .    Dx-chest-portable (1 View)    Result Date: 7/24/2019 7/24/2019 11:57 AM HISTORY/REASON FOR EXAM:  Sepsis protocol. Atrial fibrillation. Hypertension. TECHNIQUE/EXAM DESCRIPTION AND NUMBER OF VIEWS: Single portable view of the chest. COMPARISON: None available. FINDINGS: The mediastinal and cardiac silhouette is enlarged. The pulmonary vascularity is within normal limits. The lung  parenchyma is clear. There is no significant pleural effusion. There is no visible pneumothorax. There are postsurgical changes in the cervical thoracic spine. There are right lateral rib fractures, probably old.     1.  There is no acute cardiopulmonary process. 2.  There is an enlarged cardiac silhouette.      Micro:  Results     ** No results found for the last 168 hours. **        Assessment/Plan:  Sacral decubitis, Stage IV, on treatment   Complicated by underlying sacral osteomyelitis, on treatment  Fluid collection, concern for abscess base of penis, on treatment  Afebrile  No leukocytosis  Wound culture polymicrobial (MRSA, Bfrag, Strep Constellatus/Milleri)  Blood cxs on 7/24 negative  CT on 7/26 w/ 3.8 cm deep decub ulcer extending to bone, presacral edema, 2 cm fluid collection at base of penis may be abscess  Refused MRI-OSH   XRAY + OM by report  s/p diverting colostomy on 7/27 by Dr. Hannah  S/p debridement of grade IV pressure ulcer cavity of the sacrum, debridement of sacral bone with bone biopsies, bilateral sliding gluteus myocutaneous flaps for ulcer coverage   Continue IV Vancomycin, ceftriaxone 2 g every 24 hours, p.o. Flagyl 500 mg every 8 hours (no Unasyn due to shortage)  Monitor renal function at least 3 times weekly and Vanco troughs  Last vancomycin trough of 16.1 on 8/28  Continue IV antibiotics for 6 weeks-follow with PO if indicated  8/20 wound care pictures noted -scant slough, no odor or s/s of infection.  Await wound care reassessment after flap placement  Pathology from 8/21 consistent with acute osteomyelitis.  Culture positive for MRCoNS.  Since debridement with flap was performed on 8/21, will have to reset the antibiotic clock from this day  Stop date IV abx 10/1/19    Cervical quad  Likely etiology of above  Impediment to offloading and healing     Discussed with primary, Dr. Saucedo.  ID will follow.  Please call with questions.

## 2019-08-30 LAB — VANCOMYCIN TROUGH SERPL-MCNC: 16.7 UG/ML (ref 10–20)

## 2019-08-30 PROCEDURE — 80202 ASSAY OF VANCOMYCIN: CPT

## 2019-08-31 LAB
ANION GAP SERPL CALC-SCNC: 10 MMOL/L (ref 0–11.9)
BUN SERPL-MCNC: 17 MG/DL (ref 8–22)
CALCIUM SERPL-MCNC: 8.8 MG/DL (ref 8.4–10.2)
CHLORIDE SERPL-SCNC: 101 MMOL/L (ref 96–112)
CO2 SERPL-SCNC: 25 MMOL/L (ref 20–33)
CREAT SERPL-MCNC: 0.47 MG/DL (ref 0.5–1.4)
GLUCOSE SERPL-MCNC: 97 MG/DL (ref 65–99)
POTASSIUM SERPL-SCNC: 4.3 MMOL/L (ref 3.6–5.5)
SODIUM SERPL-SCNC: 136 MMOL/L (ref 135–145)

## 2019-08-31 PROCEDURE — 80048 BASIC METABOLIC PNL TOTAL CA: CPT

## 2019-09-02 LAB
ALBUMIN SERPL BCP-MCNC: 3.1 G/DL (ref 3.2–4.9)
ALBUMIN/GLOB SERPL: 1.1 G/DL
ALP SERPL-CCNC: 73 U/L (ref 30–99)
ALT SERPL-CCNC: 11 U/L (ref 2–50)
ANION GAP SERPL CALC-SCNC: 9 MMOL/L (ref 0–11.9)
AST SERPL-CCNC: 14 U/L (ref 12–45)
BILIRUB SERPL-MCNC: 0.3 MG/DL (ref 0.1–1.5)
BUN SERPL-MCNC: 14 MG/DL (ref 8–22)
CALCIUM SERPL-MCNC: 9 MG/DL (ref 8.4–10.2)
CHLORIDE SERPL-SCNC: 109 MMOL/L (ref 96–112)
CO2 SERPL-SCNC: 26 MMOL/L (ref 20–33)
CREAT SERPL-MCNC: 0.42 MG/DL (ref 0.5–1.4)
CRP SERPL HS-MCNC: 1.76 MG/DL (ref 0–0.75)
ERYTHROCYTE [DISTWIDTH] IN BLOOD BY AUTOMATED COUNT: 72.2 FL (ref 35.9–50)
ERYTHROCYTE [SEDIMENTATION RATE] IN BLOOD BY WESTERGREN METHOD: 50 MM/HOUR (ref 0–20)
GLOBULIN SER CALC-MCNC: 2.8 G/DL (ref 1.9–3.5)
GLUCOSE SERPL-MCNC: 98 MG/DL (ref 65–99)
HCT VFR BLD AUTO: 31.9 % (ref 42–52)
HGB BLD-MCNC: 9.7 G/DL (ref 14–18)
MCH RBC QN AUTO: 27.2 PG (ref 27–33)
MCHC RBC AUTO-ENTMCNC: 30.4 G/DL (ref 33.7–35.3)
MCV RBC AUTO: 89.4 FL (ref 81.4–97.8)
PLATELET # BLD AUTO: 227 K/UL (ref 164–446)
PMV BLD AUTO: 8.5 FL (ref 9–12.9)
POTASSIUM SERPL-SCNC: 4.4 MMOL/L (ref 3.6–5.5)
PROT SERPL-MCNC: 5.9 G/DL (ref 6–8.2)
RBC # BLD AUTO: 3.57 M/UL (ref 4.7–6.1)
SODIUM SERPL-SCNC: 144 MMOL/L (ref 135–145)
WBC # BLD AUTO: 5.8 K/UL (ref 4.8–10.8)

## 2019-09-02 PROCEDURE — 80053 COMPREHEN METABOLIC PANEL: CPT

## 2019-09-02 PROCEDURE — 85027 COMPLETE CBC AUTOMATED: CPT

## 2019-09-02 PROCEDURE — 85652 RBC SED RATE AUTOMATED: CPT

## 2019-09-02 PROCEDURE — 86140 C-REACTIVE PROTEIN: CPT

## 2019-09-02 ASSESSMENT — ENCOUNTER SYMPTOMS
ABDOMINAL PAIN: 0
DIARRHEA: 0
FOCAL WEAKNESS: 1
FEVER: 0
VOMITING: 0
NAUSEA: 0

## 2019-09-02 NOTE — [facility]
Infectious Disease Progress Note    Author: Manuel Valencia M.D. Date & Time of service: 9/2/2019  9:32 AM    Chief Complaint:  Sacral decubitus  Sacral osteomyelitis     Interval History:  69 y.o. WM with  C5- 7 complete quadriplegia admitted 7/24/2019 from [facility] on 7/24/2019 for wound check on his coccyx. Large decub to bone  7/26 AF WBC 5.9  seen with wound care-no surrounding cellulitis. Planned for CT and biopsy today  7/27 AF n IR declined to biopsy sacral bone. No fever or chills-had colostomy done this am  7/28 afebrile WBC 7.1 patient transferred to [facility] after colostomy creation yesterday.  He denies any abdominal pain.  Tolerating IV antibiotics.  7/30- AF, WBC 5.8, no issue with antibiotics, denies any pain, reports plastic surgeon wanting to hold off on flap at this time.  7/31-AF, WBC 5.9, tolerating antibiotics, WV in place to sacrum, denies any pain, agreeable to PICC placement for need of IV antibiotics x6 weeks.  8/9/2019 no fevers.  Creatinine is 0.59 Denies any pain or any new issues.  8/10/2019-no fevers.  No new issues overnight.  8/13/2019-no fevers.  Tolerating antibiotics without any issues.  8/16 AF WBC 4.8 ate all of breakfast-feels pretty good. Denies SE abx and pain controlled. Plan for surgery next week noted  8/19 AF sleeping soundly at time of rounds-NAD  8/20/2019 AF WBC 4.8 up to chair-no problems with PICC ir abx-pain controlled. Plan for OR tomorrow  8/21 AF plan for OR today-no new complaints  8/22 AF sleeping soundly at time of rounds-tolerated procedure well  8/23 afebrile, white count 5000.  Tolerating antibiotics, no new issues.  8/28 Afebrile, white count 4.9.  Tolerating antibiotics, no new issues.  Bone pathology from 8/21 debridement consistent with acute osteomyelitis.   9/2 afebrile, white count 5.8.  Reports tolerating antibiotics, no new issues, I think questions about ability to confirm resolution of bone infection    Review of  Systems:  Review of Systems   Constitutional: Negative for fever.   Gastrointestinal: Negative for abdominal pain, diarrhea, nausea and vomiting.   Skin: Negative for itching and rash.   Neurological: Positive for focal weakness.   All other systems reviewed and are negative.    Hemodynamics:  T 97.1 /82 HR 75 RR 20 O2 94%  Physical Exam:  Physical Exam   Constitutional: He is oriented to person, place, and time. He appears well-developed. No distress.   HENT:   Mouth/Throat: Oropharynx is clear and moist.   Eyes: Conjunctivae are normal. Right eye exhibits no discharge. Left eye exhibits no discharge. No scleral icterus.   Neck: No JVD present.   Cardiovascular: Normal rate, regular rhythm and normal heart sounds.   Pulmonary/Chest: Effort normal. No stridor. No respiratory distress. He has no wheezes.   Abdominal: Soft. He exhibits no distension. There is no tenderness.   Colostomy present   Musculoskeletal: He exhibits no edema.   RUE PICC nontender   Neurological: He is alert and oriented to person, place, and time.   Paraplegia  sarcopenia   Skin: Skin is warm. He is not diaphoretic. No erythema.   Sacrum dressed  Per wound care notes from 8/21, his incisions are well approximated with staples in place, no S/S of infection   Psychiatric: He has a normal mood and affect. His behavior is normal.   Very pleasant   Vitals reviewed.    Labs:  Recent Labs     09/02/19 0210   WBC 5.8   RBC 3.57*   HEMOGLOBIN 9.7*   HEMATOCRIT 31.9*   MCV 89.4   MCH 27.2   RDW 72.2*   PLATELETCT 227   MPV 8.5*     Recent Labs     08/31/19 0244 09/02/19 0210   SODIUM 136 144   POTASSIUM 4.3 4.4   CHLORIDE 101 109   CO2 25 26   GLUCOSE 97 98   BUN 17 14     Recent Labs     08/31/19 0244 09/02/19 0210   ALBUMIN  --  3.1*   TBILIRUBIN  --  0.3   ALKPHOSPHAT  --  73   TOTPROTEIN  --  5.9*   ALTSGPT  --  11   ASTSGOT  --  14   CREATININE 0.47* 0.42*       Imaging:  .    Dx-chest-portable (1 View)    Result Date:  7/24/2019 7/24/2019 11:57 AM HISTORY/REASON FOR EXAM:  Sepsis protocol. Atrial fibrillation. Hypertension. TECHNIQUE/EXAM DESCRIPTION AND NUMBER OF VIEWS: Single portable view of the chest. COMPARISON: None available. FINDINGS: The mediastinal and cardiac silhouette is enlarged. The pulmonary vascularity is within normal limits. The lung parenchyma is clear. There is no significant pleural effusion. There is no visible pneumothorax. There are postsurgical changes in the cervical thoracic spine. There are right lateral rib fractures, probably old.     1.  There is no acute cardiopulmonary process. 2.  There is an enlarged cardiac silhouette.      Micro:  Results     ** No results found for the last 168 hours. **        Assessment/Plan:  Sacral decubitis, Stage IV, on treatment   Complicated by underlying sacral osteomyelitis, on treatment  Fluid collection, concern for abscess base of penis, on treatment  Afebrile  No leukocytosis  Wound culture polymicrobial (MRSA, Bfrag, Strep Constellatus/Milleri)  Blood cxs on 7/24 negative  CT on 7/26 w/ 3.8 cm deep decub ulcer extending to bone, presacral edema, 2 cm fluid collection at base of penis may be abscess  Refused MRI-OSH   XRAY + OM by report  s/p diverting colostomy on 7/27 by Dr. Hannah  S/p debridement of grade IV pressure ulcer cavity of the sacrum, debridement of sacral bone with bone biopsies, bilateral sliding gluteus myocutaneous flaps for ulcer coverage   Continue IV Vancomycin, ceftriaxone 2 g every 24 hours, p.o. Flagyl 500 mg every 8 hours (no Unasyn due to shortage)  Monitor renal function at least 3 times weekly and Vanco troughs  Last vancomycin trough of 16.7 on 8/30  Continue IV antibiotics for 6 weeks  8/20 wound care pictures noted -scant slough, no odor or s/s of infection.  Await wound care reassessment after flap placement  Pathology from 8/21 consistent with acute osteomyelitis. Culture positive for MRCoNS.  Since debridement with flap was  performed on 8/21, reset the antibiotic clock from this day  Per wound care notes from 8/21, his incisions are well approximated with staples in place, no S/S of infection  Stop date IV abx 10/1/19    Cervical quad  Likely etiology of above  Impediment to offloading and healing     Discussed with primary, Dr. Saucedo.  ID will follow.  Please call with questions.

## 2019-09-03 LAB
ALBUMIN SERPL BCP-MCNC: 3.2 G/DL (ref 3.2–4.9)
ALBUMIN/GLOB SERPL: 1.2 G/DL
ALP SERPL-CCNC: 76 U/L (ref 30–99)
ALT SERPL-CCNC: 11 U/L (ref 2–50)
ANION GAP SERPL CALC-SCNC: 10 MMOL/L (ref 0–11.9)
AST SERPL-CCNC: 12 U/L (ref 12–45)
BILIRUB SERPL-MCNC: 0.3 MG/DL (ref 0.1–1.5)
BUN SERPL-MCNC: 13 MG/DL (ref 8–22)
CALCIUM SERPL-MCNC: 8.9 MG/DL (ref 8.4–10.2)
CHLORIDE SERPL-SCNC: 106 MMOL/L (ref 96–112)
CO2 SERPL-SCNC: 27 MMOL/L (ref 20–33)
CREAT SERPL-MCNC: 0.35 MG/DL (ref 0.5–1.4)
ERYTHROCYTE [DISTWIDTH] IN BLOOD BY AUTOMATED COUNT: 71.4 FL (ref 35.9–50)
GLOBULIN SER CALC-MCNC: 2.7 G/DL (ref 1.9–3.5)
GLUCOSE SERPL-MCNC: 94 MG/DL (ref 65–99)
HCT VFR BLD AUTO: 33.3 % (ref 42–52)
HGB BLD-MCNC: 10.3 G/DL (ref 14–18)
MAGNESIUM SERPL-MCNC: 2 MG/DL (ref 1.5–2.5)
MCH RBC QN AUTO: 27.6 PG (ref 27–33)
MCHC RBC AUTO-ENTMCNC: 30.9 G/DL (ref 33.7–35.3)
MCV RBC AUTO: 89.3 FL (ref 81.4–97.8)
PHOSPHATE SERPL-MCNC: 3.7 MG/DL (ref 2.5–4.5)
PLATELET # BLD AUTO: 205 K/UL (ref 164–446)
PMV BLD AUTO: 8 FL (ref 9–12.9)
POTASSIUM SERPL-SCNC: 4.3 MMOL/L (ref 3.6–5.5)
PROT SERPL-MCNC: 5.9 G/DL (ref 6–8.2)
RBC # BLD AUTO: 3.73 M/UL (ref 4.7–6.1)
SODIUM SERPL-SCNC: 143 MMOL/L (ref 135–145)
VANCOMYCIN TROUGH SERPL-MCNC: 16.3 UG/ML (ref 10–20)
WBC # BLD AUTO: 6.5 K/UL (ref 4.8–10.8)

## 2019-09-03 PROCEDURE — 80202 ASSAY OF VANCOMYCIN: CPT

## 2019-09-03 PROCEDURE — 85027 COMPLETE CBC AUTOMATED: CPT

## 2019-09-03 PROCEDURE — 83735 ASSAY OF MAGNESIUM: CPT

## 2019-09-03 PROCEDURE — 84100 ASSAY OF PHOSPHORUS: CPT

## 2019-09-03 PROCEDURE — 80053 COMPREHEN METABOLIC PANEL: CPT

## 2019-09-07 LAB
ANION GAP SERPL CALC-SCNC: 7 MMOL/L (ref 0–11.9)
BUN SERPL-MCNC: 13 MG/DL (ref 8–22)
CALCIUM SERPL-MCNC: 8.7 MG/DL (ref 8.4–10.2)
CHLORIDE SERPL-SCNC: 107 MMOL/L (ref 96–112)
CO2 SERPL-SCNC: 27 MMOL/L (ref 20–33)
CREAT SERPL-MCNC: 0.33 MG/DL (ref 0.5–1.4)
GLUCOSE SERPL-MCNC: 88 MG/DL (ref 65–99)
POTASSIUM SERPL-SCNC: 4.3 MMOL/L (ref 3.6–5.5)
SODIUM SERPL-SCNC: 141 MMOL/L (ref 135–145)
VANCOMYCIN TROUGH SERPL-MCNC: 13 UG/ML (ref 10–20)

## 2019-09-07 PROCEDURE — 80048 BASIC METABOLIC PNL TOTAL CA: CPT

## 2019-09-07 PROCEDURE — 80202 ASSAY OF VANCOMYCIN: CPT

## 2019-09-08 LAB — VANCOMYCIN TROUGH SERPL-MCNC: 16 UG/ML (ref 10–20)

## 2019-09-08 PROCEDURE — 80202 ASSAY OF VANCOMYCIN: CPT

## 2019-09-10 ASSESSMENT — ENCOUNTER SYMPTOMS
FEVER: 0
NAUSEA: 0
ABDOMINAL PAIN: 0
FOCAL WEAKNESS: 1
VOMITING: 0
DIARRHEA: 0

## 2019-09-10 NOTE — [facility]
Infectious Disease Progress Note    Author: Manuel Valencia M.D. Date & Time of service: 9/10/2019  8:35 AM    Chief Complaint:  Sacral decubitus  Sacral osteomyelitis     Interval History:  69 y.o. WM with  C5- 7 complete quadriplegia admitted 7/24/2019 from [facility] on 7/24/2019 for wound check on his coccyx. Large decub to bone  7/26 AF WBC 5.9  seen with wound care-no surrounding cellulitis. Planned for CT and biopsy today  7/27 AF n IR declined to biopsy sacral bone. No fever or chills-had colostomy done this am  7/28 afebrile WBC 7.1 patient transferred to [facility] after colostomy creation yesterday.  He denies any abdominal pain.  Tolerating IV antibiotics.  7/30- AF, WBC 5.8, no issue with antibiotics, denies any pain, reports plastic surgeon wanting to hold off on flap at this time.  7/31-AF, WBC 5.9, tolerating antibiotics, WV in place to sacrum, denies any pain, agreeable to PICC placement for need of IV antibiotics x6 weeks.  8/9/2019 no fevers.  Creatinine is 0.59 Denies any pain or any new issues.  8/10/2019-no fevers.  No new issues overnight.  8/13/2019-no fevers.  Tolerating antibiotics without any issues.  8/16 AF WBC 4.8 ate all of breakfast-feels pretty good. Denies SE abx and pain controlled. Plan for surgery next week noted  8/19 AF sleeping soundly at time of rounds-NAD  8/20/2019 AF WBC 4.8 up to chair-no problems with PICC ir abx-pain controlled. Plan for OR tomorrow  8/21 AF plan for OR today-no new complaints  8/22 AF sleeping soundly at time of rounds-tolerated procedure well  8/23 afebrile, white count 5000.  Tolerating antibiotics, no new issues.  8/28 Afebrile, white count 4.9.  Tolerating antibiotics, no new issues.  Bone pathology from 8/21 debridement consistent with acute osteomyelitis.   9/2 afebrile, white count 5.8.  Reports tolerating antibiotics, no new issues, I think questions about ability to confirm resolution of bone infection  9/10 afebrile, no  CBC today.  Last white count 6.5 on 9/3.  Normal renal function.  Reports no new issues, tolerating antibiotics.    Review of Systems:  Review of Systems   Constitutional: Negative for fever.   Gastrointestinal: Negative for abdominal pain, diarrhea, nausea and vomiting.   Skin: Negative for itching and rash.   Neurological: Positive for focal weakness.   All other systems reviewed and are negative.    Hemodynamics:  T 98.3 /76 HR 73 RR 18 O2 97%  Physical Exam:  Physical Exam   Constitutional: He is oriented to person, place, and time. He appears well-developed. No distress.   HENT:   Mouth/Throat: Oropharynx is clear and moist.   Eyes: Conjunctivae are normal. Right eye exhibits no discharge. Left eye exhibits no discharge. No scleral icterus.   Neck: No JVD present.   Cardiovascular: Normal rate, regular rhythm and normal heart sounds.   Pulmonary/Chest: Effort normal. No stridor. No respiratory distress. He has no wheezes.   Abdominal: Soft. He exhibits no distension. There is no tenderness.   Colostomy present   Musculoskeletal: He exhibits no edema.   RUE PICC nontender   Neurological: He is alert and oriented to person, place, and time.   Paraplegia  sarcopenia   Skin: Skin is warm. He is not diaphoretic. No erythema.   Sacrum dressed  Per wound care notes from 9/6, incision is well approximated, with intact staples, no redness.   Psychiatric: He has a normal mood and affect. His behavior is normal.   Very pleasant   Vitals reviewed.    Labs:  No results for input(s): WBC, RBC, HEMOGLOBIN, HEMATOCRIT, MCV, MCH, RDW, PLATELETCT, MPV, NEUTSPOLYS, LYMPHOCYTES, MONOCYTES, EOSINOPHILS, BASOPHILS, RBCMORPHOLO in the last 72 hours.  Recent Labs     09/07/19  0837   SODIUM 141   POTASSIUM 4.3   CHLORIDE 107   CO2 27   GLUCOSE 88   BUN 13     Recent Labs     09/07/19  0837   CREATININE 0.33*       Imaging:  .    Dx-chest-portable (1 View)    Result Date: 7/24/2019 7/24/2019 11:57 AM HISTORY/REASON FOR EXAM:   Sepsis protocol. Atrial fibrillation. Hypertension. TECHNIQUE/EXAM DESCRIPTION AND NUMBER OF VIEWS: Single portable view of the chest. COMPARISON: None available. FINDINGS: The mediastinal and cardiac silhouette is enlarged. The pulmonary vascularity is within normal limits. The lung parenchyma is clear. There is no significant pleural effusion. There is no visible pneumothorax. There are postsurgical changes in the cervical thoracic spine. There are right lateral rib fractures, probably old.     1.  There is no acute cardiopulmonary process. 2.  There is an enlarged cardiac silhouette.      Micro:  Results     ** No results found for the last 168 hours. **        Assessment/Plan:  Sacral decubitis, Stage IV, on treatment   Complicated by underlying sacral osteomyelitis, on treatment  Fluid collection, concern for abscess base of penis, on treatment  Afebrile  No leukocytosis  Wound culture polymicrobial (MRSA, Bfrag, Strep Constellatus/Milleri)  Blood cxs on 7/24 negative  CT on 7/26 w/ 3.8 cm deep decub ulcer extending to bone, presacral edema, 2 cm fluid collection at base of penis may be abscess  Refused MRI-OSH   XRAY + OM by report  s/p diverting colostomy on 7/27 by Dr. Hannah  S/p debridement of grade IV pressure ulcer cavity of the sacrum, debridement of sacral bone with bone biopsies, bilateral sliding gluteus myocutaneous flaps for ulcer coverage   Continue IV Vancomycin, ceftriaxone 2 g every 24 hours, p.o. Flagyl 500 mg every 8 hours (not on Unasyn due to shortage)  Monitor renal function at least 3 times weekly and Vanco troughs  Last vancomycin trough of 16 on 9/8  Continue IV antibiotics for 6 weeks  8/20 wound care pictures noted -scant slough, no odor or s/s of infection.  Await wound care reassessment after flap placement  Pathology from 8/21 consistent with acute osteomyelitis. Culture positive for MRCoNS.  Since debridement with flap was performed on 8/21, reset the antibiotic clock from this  day  Per wound care notes from 8/21, his incisions are well approximated with staples in place, no S/S of infection  Per wound care notes from 9/6, incision is well approximated, with intact staples, no redness.  Stop date IV abx 10/1/19  At least weekly CBC with differential, CMP while on IV antibiotics    Cervical quad  Likely etiology of above  Impediment to offloading and healing     Discussed with primary MARCELO Ramos.  ID will follow.  Please call with questions.

## 2019-09-11 LAB
ALBUMIN SERPL BCP-MCNC: 3.2 G/DL (ref 3.2–4.9)
ALBUMIN/GLOB SERPL: 1.1 G/DL
ALP SERPL-CCNC: 64 U/L (ref 30–99)
ALT SERPL-CCNC: 9 U/L (ref 2–50)
ANION GAP SERPL CALC-SCNC: 8 MMOL/L (ref 0–11.9)
AST SERPL-CCNC: 11 U/L (ref 12–45)
BILIRUB SERPL-MCNC: 0.2 MG/DL (ref 0.1–1.5)
BUN SERPL-MCNC: 12 MG/DL (ref 8–22)
CALCIUM SERPL-MCNC: 9 MG/DL (ref 8.4–10.2)
CHLORIDE SERPL-SCNC: 106 MMOL/L (ref 96–112)
CO2 SERPL-SCNC: 28 MMOL/L (ref 20–33)
CREAT SERPL-MCNC: 0.41 MG/DL (ref 0.5–1.4)
CRP SERPL HS-MCNC: 2.23 MG/DL (ref 0–0.75)
ERYTHROCYTE [DISTWIDTH] IN BLOOD BY AUTOMATED COUNT: 66 FL (ref 35.9–50)
ERYTHROCYTE [SEDIMENTATION RATE] IN BLOOD BY WESTERGREN METHOD: 52 MM/HOUR (ref 0–20)
GLOBULIN SER CALC-MCNC: 2.8 G/DL (ref 1.9–3.5)
GLUCOSE SERPL-MCNC: 114 MG/DL (ref 65–99)
HCT VFR BLD AUTO: 34.4 % (ref 42–52)
HGB BLD-MCNC: 10.6 G/DL (ref 14–18)
MAGNESIUM SERPL-MCNC: 2 MG/DL (ref 1.5–2.5)
MCH RBC QN AUTO: 27.6 PG (ref 27–33)
MCHC RBC AUTO-ENTMCNC: 30.8 G/DL (ref 33.7–35.3)
MCV RBC AUTO: 89.6 FL (ref 81.4–97.8)
PHOSPHATE SERPL-MCNC: 3.4 MG/DL (ref 2.5–4.5)
PLATELET # BLD AUTO: 222 K/UL (ref 164–446)
PMV BLD AUTO: 8.2 FL (ref 9–12.9)
POTASSIUM SERPL-SCNC: 4.5 MMOL/L (ref 3.6–5.5)
PROT SERPL-MCNC: 6 G/DL (ref 6–8.2)
RBC # BLD AUTO: 3.84 M/UL (ref 4.7–6.1)
SODIUM SERPL-SCNC: 142 MMOL/L (ref 135–145)
VANCOMYCIN TROUGH SERPL-MCNC: 14.5 UG/ML (ref 10–20)
WBC # BLD AUTO: 4.9 K/UL (ref 4.8–10.8)

## 2019-09-11 PROCEDURE — 80202 ASSAY OF VANCOMYCIN: CPT

## 2019-09-11 PROCEDURE — 80053 COMPREHEN METABOLIC PANEL: CPT

## 2019-09-11 PROCEDURE — 86140 C-REACTIVE PROTEIN: CPT

## 2019-09-11 PROCEDURE — 85652 RBC SED RATE AUTOMATED: CPT

## 2019-09-11 PROCEDURE — 84100 ASSAY OF PHOSPHORUS: CPT

## 2019-09-11 PROCEDURE — 83735 ASSAY OF MAGNESIUM: CPT

## 2019-09-11 PROCEDURE — 85027 COMPLETE CBC AUTOMATED: CPT

## 2019-09-13 LAB
ANION GAP SERPL CALC-SCNC: 7 MMOL/L (ref 0–11.9)
BUN SERPL-MCNC: 13 MG/DL (ref 8–22)
CALCIUM SERPL-MCNC: 9.2 MG/DL (ref 8.4–10.2)
CHLORIDE SERPL-SCNC: 105 MMOL/L (ref 96–112)
CO2 SERPL-SCNC: 27 MMOL/L (ref 20–33)
CREAT SERPL-MCNC: 0.4 MG/DL (ref 0.5–1.4)
GLUCOSE SERPL-MCNC: 123 MG/DL (ref 65–99)
POTASSIUM SERPL-SCNC: 4 MMOL/L (ref 3.6–5.5)
SODIUM SERPL-SCNC: 139 MMOL/L (ref 135–145)
VANCOMYCIN TROUGH SERPL-MCNC: 13.2 UG/ML (ref 10–20)

## 2019-09-13 PROCEDURE — 80048 BASIC METABOLIC PNL TOTAL CA: CPT

## 2019-09-13 PROCEDURE — 80202 ASSAY OF VANCOMYCIN: CPT

## 2019-09-16 LAB
ANION GAP SERPL CALC-SCNC: 7 MMOL/L (ref 0–11.9)
BUN SERPL-MCNC: 15 MG/DL (ref 8–22)
CALCIUM SERPL-MCNC: 9.2 MG/DL (ref 8.4–10.2)
CHLORIDE SERPL-SCNC: 105 MMOL/L (ref 96–112)
CO2 SERPL-SCNC: 28 MMOL/L (ref 20–33)
CREAT SERPL-MCNC: 0.49 MG/DL (ref 0.5–1.4)
GLUCOSE SERPL-MCNC: 93 MG/DL (ref 65–99)
POTASSIUM SERPL-SCNC: 4.4 MMOL/L (ref 3.6–5.5)
SODIUM SERPL-SCNC: 140 MMOL/L (ref 135–145)
VANCOMYCIN TROUGH SERPL-MCNC: 21.4 UG/ML (ref 10–20)

## 2019-09-16 PROCEDURE — 80202 ASSAY OF VANCOMYCIN: CPT

## 2019-09-16 PROCEDURE — 80048 BASIC METABOLIC PNL TOTAL CA: CPT

## 2019-09-16 ASSESSMENT — ENCOUNTER SYMPTOMS
NAUSEA: 0
FEVER: 0
VOMITING: 0
FOCAL WEAKNESS: 1
DIARRHEA: 0
ABDOMINAL PAIN: 0

## 2019-09-16 NOTE — [facility]
Infectious Disease Progress Note    Author: Manuel Valencia M.D. Date & Time of service: 9/16/2019  9:41 AM    Chief Complaint:  Sacral decubitus  Sacral osteomyelitis     Interval History:  69 y.o. WM with  C5- 7 complete quadriplegia admitted 7/24/2019 from [facility] on 7/24/2019 for wound check on his coccyx. Large decub to bone  7/26 AF WBC 5.9  seen with wound care-no surrounding cellulitis. Planned for CT and biopsy today  7/27 AF n IR declined to biopsy sacral bone. No fever or chills-had colostomy done this am  7/28 afebrile WBC 7.1 patient transferred to [facility] after colostomy creation yesterday.  He denies any abdominal pain.  Tolerating IV antibiotics.  7/30- AF, WBC 5.8, no issue with antibiotics, denies any pain, reports plastic surgeon wanting to hold off on flap at this time.  7/31-AF, WBC 5.9, tolerating antibiotics, WV in place to sacrum, denies any pain, agreeable to PICC placement for need of IV antibiotics x6 weeks.  8/9/2019 no fevers.  Creatinine is 0.59 Denies any pain or any new issues.  8/10/2019-no fevers.  No new issues overnight.  8/13/2019-no fevers.  Tolerating antibiotics without any issues.  8/16 AF WBC 4.8 ate all of breakfast-feels pretty good. Denies SE abx and pain controlled. Plan for surgery next week noted  8/19 AF sleeping soundly at time of rounds-NAD  8/20/2019 AF WBC 4.8 up to chair-no problems with PICC ir abx-pain controlled. Plan for OR tomorrow  8/21 AF plan for OR today-no new complaints  8/22 AF sleeping soundly at time of rounds-tolerated procedure well  8/23 afebrile, white count 5000.  Tolerating antibiotics, no new issues.  8/28 Afebrile, white count 4.9.  Tolerating antibiotics, no new issues.  Bone pathology from 8/21 debridement consistent with acute osteomyelitis.   9/2 afebrile, white count 5.8.  Reports tolerating antibiotics, no new issues, I think questions about ability to confirm resolution of bone infection  9/10 afebrile, no  CBC today.  Last white count 6.5 on 9/3.  Normal renal function.  Reports no new issues, tolerating antibiotics.  9/11 afebrile, no CBC today.  Tolerating antibiotics, no new issues to report.  Patient will be moving to a SNF from [facility].    Review of Systems:  Review of Systems   Constitutional: Negative for fever.   Gastrointestinal: Negative for abdominal pain, diarrhea, nausea and vomiting.   Skin: Negative for itching and rash.   Neurological: Positive for focal weakness.   All other systems reviewed and are negative.    Hemodynamics:  T 97 /88 HR 69 RR 18 O2 96%  Physical Exam:  Physical Exam   Constitutional: He is oriented to person, place, and time. He appears well-developed. No distress.   HENT:   Mouth/Throat: Oropharynx is clear and moist.   Eyes: Conjunctivae are normal. Right eye exhibits no discharge. Left eye exhibits no discharge. No scleral icterus.   Neck: No JVD present.   Cardiovascular: Normal rate, regular rhythm and normal heart sounds.   Pulmonary/Chest: Effort normal. No stridor. No respiratory distress. He has no wheezes.   Abdominal: Soft. He exhibits no distension. There is no tenderness.   Colostomy present   Musculoskeletal: He exhibits no edema.   RUE PICC nontender   Neurological: He is alert and oriented to person, place, and time.   Paraplegia  sarcopenia   Skin: Skin is warm. He is not diaphoretic. No erythema.   Sacrum dressed  Wound care note from 9/12 and pictures noted.  Staples removed, wound is well approximated with no active drainage or erythema noted, healing well   Psychiatric: He has a normal mood and affect. His behavior is normal.   Very pleasant   Vitals reviewed.    Labs:  No results for input(s): WBC, RBC, HEMOGLOBIN, HEMATOCRIT, MCV, MCH, RDW, PLATELETCT, MPV, NEUTSPOLYS, LYMPHOCYTES, MONOCYTES, EOSINOPHILS, BASOPHILS, RBCMORPHOLO in the last 72 hours.  Recent Labs     09/16/19  0812   SODIUM 140   POTASSIUM 4.4   CHLORIDE 105   CO2 28   GLUCOSE 93   BUN  15     Recent Labs     09/16/19  0812   CREATININE 0.49*       Imaging:  .    Dx-chest-portable (1 View)    Result Date: 7/24/2019 7/24/2019 11:57 AM HISTORY/REASON FOR EXAM:  Sepsis protocol. Atrial fibrillation. Hypertension. TECHNIQUE/EXAM DESCRIPTION AND NUMBER OF VIEWS: Single portable view of the chest. COMPARISON: None available. FINDINGS: The mediastinal and cardiac silhouette is enlarged. The pulmonary vascularity is within normal limits. The lung parenchyma is clear. There is no significant pleural effusion. There is no visible pneumothorax. There are postsurgical changes in the cervical thoracic spine. There are right lateral rib fractures, probably old.     1.  There is no acute cardiopulmonary process. 2.  There is an enlarged cardiac silhouette.      Micro:  Results     ** No results found for the last 168 hours. **        Assessment/Plan:  Sacral decubitis, Stage IV, on treatment   Complicated by underlying sacral osteomyelitis, on treatment  Fluid collection, concern for abscess base of penis, on treatment  Afebrile  No leukocytosis  Wound culture polymicrobial (MRSA, Bfrag, Strep Constellatus/Milleri)  Blood cxs on 7/24 negative  CT on 7/26 w/ 3.8 cm deep decub ulcer extending to bone, presacral edema, 2 cm fluid collection at base of penis may be abscess  Refused MRI-OSH   XRAY + OM by report  s/p diverting colostomy on 7/27 by Dr. Hannah  S/p debridement of grade IV pressure ulcer cavity of the sacrum, debridement of sacral bone with bone biopsies, bilateral sliding gluteus myocutaneous flaps for ulcer coverage   Continue IV Vancomycin, ceftriaxone 2 g every 24 hours, p.o. Flagyl 500 mg every 8 hours  Last vancomycin trough of 21.4 on 9/16  Continue IV antibiotics for 6 weeks  8/20 wound care pictures noted -scant slough, no odor or s/s of infection.  Await wound care reassessment after flap placement  Pathology from 8/21 consistent with acute osteomyelitis. Culture positive for MRCoNS.  Since  debridement with flap was performed on 8/21, reset the antibiotic clock from this day  Per wound care notes from 8/21, his incisions are well approximated with staples in place, no S/S of infection  Per wound care notes from 9/6, incision is well approximated, with intact staples, no redness.  Wound care note from 9/12 and pictures noted.  Staples removed, wound is well approximated with no active drainage or erythema noted, healing well  Stop date IV abx 10/1/19  At least weekly CBC with differential, CMP while on IV antibiotics  Monitor vancomycin levels and renal function per pharmacy protocol  Normal BMP 9/16.  Check CBC in a.m.    Cervical quad  Likely etiology of above  Impediment to offloading and healing     Discussed with primary MARCELO Ramos.  ID will sign off.  Please call with questions.

## 2019-09-17 LAB — VANCOMYCIN TROUGH SERPL-MCNC: 13.2 UG/ML (ref 10–20)

## 2019-09-17 PROCEDURE — 80202 ASSAY OF VANCOMYCIN: CPT

## 2019-09-30 ENCOUNTER — HOSPITAL ENCOUNTER (INPATIENT)
Facility: REHABILITATION | Age: 69
End: 2019-09-30
Attending: PHYSICAL MEDICINE & REHABILITATION | Admitting: PHYSICAL MEDICINE & REHABILITATION
Payer: MEDICARE

## 2019-10-14 ENCOUNTER — HOSPITAL ENCOUNTER (INPATIENT)
Facility: REHABILITATION | Age: 69
End: 2019-10-14
Attending: PHYSICAL MEDICINE & REHABILITATION | Admitting: PHYSICAL MEDICINE & REHABILITATION
Payer: MEDICARE

## 2019-10-24 NOTE — OP REPORT
DATE OF SERVICE:  08/21/2019    ANESTHESIOLOGIST:  Dusty Raymond MD    SURGEON:  Amadeo Moon MD    PREOPERATIVE DIAGNOSIS:  Left grade IV pressure ulcer of the sacrum.    POSTOPERATIVE DIAGNOSIS:  Left grade IV pressure ulcer of the sacrum.    PROCEDURES:    1.  Debridement of sacral ulcer and bone.  2.  Three liter washout.  3.  Bilateral sliding gluteal myocutaneous flaps for definitive closure over a   drain.    IDENTIFYING INFORMATION:  This 69-year-old male recently was in an accident   becoming paraplegic several months ago.  He has been plagued with a sacral   ulcer ever since.  Keeping him from finishing his rehabilitation, I was   consulted for definitive muscle flap closure and cleanup.  We discussed this   risks and benefits of surgery with the patient to include infection, hematoma,   bleeding, nerve injuries, etc.  He understood and agreed to proceed.    DESCRIPTION OF PROCEDURE:  After he was brought to the operating room after   consents were signed and the procedure reviewed, he underwent general   endotracheal anesthesia by Dr. Raymond.  He was then turned into the prone   position, padded appropriately and prepped and draped in a sterile fashion.    Lidocaine 1% with 1:100,000 epinephrine was injected around the periulcer and   bone area to minimize bleeding.  We then prepped and draped sterilely and did   just a couple drops of methylene blue into the ulcer cavity to be sure to   completely excise the entire ulcer.  The ulcer measured about 5.5 cm x about   4.5 cm and about 2 cm deep, including visualization of the trembling sacral   bone.  We did a complete ulcer excision, removing all the ulcer cavity.  We   then biopsied the bone in a clean area, very indicative of he had a deep bone   infection.  We then did a 3 L washout of this entire area and then we   proceeded to dissect off the medial attachments of the gluteus rogelio muscle   on both the right and left side.  We then used a  [place] elevator with a blunt   tip and released the muscle all the way out laterally to allow about an inch   and a half of sliding of both of the gluteus muscles to cover the sacrum   completely.  We put a medium Hemovac drain deep brought up superiorly and sewn   in with a 3-0 nylon.  We then used 0 Vicryl sutures to close the deep   muscular fascia.  A myofascial layer over the sacral prominence that had been   burred down and smoothed down to be a soft, smooth surface.  We used about 4   individual 0 Vicryl sutures for the soft tissue closure, which was very nice   and without tension.  We then freed the skin up in the subcutaneous tissue   using a layer of 2-0 Vicryl followed by a stapler.  Antibiotic ointment and a   pressure dressing on top.  Sponge and needle counts were correct.  Estimated   blood loss was 50 mL.  He was placed into the prone position, re-extubated and   then taken to recovery to return to [facility] after that.       ____________________________________     MD MINO TREJO / EM[place]    DD:  10/24/2019 14:10:41  DT:  10/24/2019 14:44:40    D#:  [id number]  Job#:  [id number]

## 2019-11-15 ENCOUNTER — APPOINTMENT (OUTPATIENT)
Dept: NEUROLOGY | Facility: MEDICAL CENTER | Age: 69
End: 2019-11-15
Payer: MEDICARE

## 2019-12-06 ENCOUNTER — HOSPITAL ENCOUNTER (INPATIENT)
Facility: MEDICAL CENTER | Age: 69
LOS: 17 days | DRG: 477 | End: 2019-12-23
Attending: EMERGENCY MEDICINE | Admitting: HOSPITALIST
Payer: MEDICARE

## 2019-12-06 ENCOUNTER — TELEPHONE (OUTPATIENT)
Dept: PHYSICAL MEDICINE AND REHAB | Facility: REHABILITATION | Age: 69
End: 2019-12-06

## 2019-12-06 DIAGNOSIS — R94.31 PROLONGED QT INTERVAL: ICD-10-CM

## 2019-12-06 DIAGNOSIS — Z93.3 S/P COLOSTOMY (HCC): ICD-10-CM

## 2019-12-06 DIAGNOSIS — R00.1 BRADYCARDIA: ICD-10-CM

## 2019-12-06 DIAGNOSIS — L89.95 DECUBITUS ULCER, UNSTAGEABLE WITH INFECTION (HCC): ICD-10-CM

## 2019-12-06 DIAGNOSIS — M86.9 OSTEOMYELITIS OF COCCYX (HCC): ICD-10-CM

## 2019-12-06 DIAGNOSIS — L89.154 PRESSURE INJURY OF SACRAL REGION, STAGE 4 (HCC): ICD-10-CM

## 2019-12-06 DIAGNOSIS — I48.92 PAROXYSMAL ATRIAL FLUTTER (HCC): ICD-10-CM

## 2019-12-06 DIAGNOSIS — L08.9 DECUBITUS ULCER, UNSTAGEABLE WITH INFECTION (HCC): ICD-10-CM

## 2019-12-06 DIAGNOSIS — G82.20 PARAPLEGIA (HCC): ICD-10-CM

## 2019-12-06 DIAGNOSIS — D64.9 ANEMIA, UNSPECIFIED TYPE: ICD-10-CM

## 2019-12-06 LAB
ALBUMIN SERPL BCP-MCNC: 3.6 G/DL (ref 3.2–4.9)
ALBUMIN/GLOB SERPL: 1.1 G/DL
ALP SERPL-CCNC: 64 U/L (ref 30–99)
ALT SERPL-CCNC: 9 U/L (ref 2–50)
ANION GAP SERPL CALC-SCNC: 10 MMOL/L (ref 0–11.9)
AST SERPL-CCNC: 19 U/L (ref 12–45)
BASOPHILS # BLD AUTO: 0.5 % (ref 0–1.8)
BASOPHILS # BLD: 0.04 K/UL (ref 0–0.12)
BILIRUB SERPL-MCNC: 0.5 MG/DL (ref 0.1–1.5)
BUN SERPL-MCNC: 9 MG/DL (ref 8–22)
CALCIUM SERPL-MCNC: 9.2 MG/DL (ref 8.5–10.5)
CHLORIDE SERPL-SCNC: 108 MMOL/L (ref 96–112)
CO2 SERPL-SCNC: 23 MMOL/L (ref 20–33)
CREAT SERPL-MCNC: 0.58 MG/DL (ref 0.5–1.4)
CRP SERPL HS-MCNC: 6.17 MG/DL (ref 0–0.75)
EOSINOPHIL # BLD AUTO: 0.14 K/UL (ref 0–0.51)
EOSINOPHIL NFR BLD: 1.7 % (ref 0–6.9)
ERYTHROCYTE [DISTWIDTH] IN BLOOD BY AUTOMATED COUNT: 56.4 FL (ref 35.9–50)
ERYTHROCYTE [SEDIMENTATION RATE] IN BLOOD BY WESTERGREN METHOD: 55 MM/HOUR (ref 0–20)
GLOBULIN SER CALC-MCNC: 3.2 G/DL (ref 1.9–3.5)
GLUCOSE SERPL-MCNC: 92 MG/DL (ref 65–99)
GRAM STN SPEC: NORMAL
HCT VFR BLD AUTO: 37.2 % (ref 42–52)
HGB BLD-MCNC: 11.4 G/DL (ref 14–18)
IMM GRANULOCYTES # BLD AUTO: 0.04 K/UL (ref 0–0.11)
IMM GRANULOCYTES NFR BLD AUTO: 0.5 % (ref 0–0.9)
LACTATE BLD-SCNC: 1 MMOL/L (ref 0.5–2)
LYMPHOCYTES # BLD AUTO: 1.09 K/UL (ref 1–4.8)
LYMPHOCYTES NFR BLD: 13 % (ref 22–41)
MCH RBC QN AUTO: 29.2 PG (ref 27–33)
MCHC RBC AUTO-ENTMCNC: 30.6 G/DL (ref 33.7–35.3)
MCV RBC AUTO: 95.1 FL (ref 81.4–97.8)
MONOCYTES # BLD AUTO: 0.79 K/UL (ref 0–0.85)
MONOCYTES NFR BLD AUTO: 9.4 % (ref 0–13.4)
NEUTROPHILS # BLD AUTO: 6.29 K/UL (ref 1.82–7.42)
NEUTROPHILS NFR BLD: 74.9 % (ref 44–72)
NRBC # BLD AUTO: 0 K/UL
NRBC BLD-RTO: 0 /100 WBC
PLATELET # BLD AUTO: 296 K/UL (ref 164–446)
PMV BLD AUTO: 8.6 FL (ref 9–12.9)
POTASSIUM SERPL-SCNC: 4.6 MMOL/L (ref 3.6–5.5)
PROT SERPL-MCNC: 6.8 G/DL (ref 6–8.2)
RBC # BLD AUTO: 3.91 M/UL (ref 4.7–6.1)
SIGNIFICANT IND 70042: NORMAL
SITE SITE: NORMAL
SODIUM SERPL-SCNC: 141 MMOL/L (ref 135–145)
SOURCE SOURCE: NORMAL
WBC # BLD AUTO: 8.4 K/UL (ref 4.8–10.8)

## 2019-12-06 PROCEDURE — 700112 HCHG RX REV CODE 229: Performed by: HOSPITALIST

## 2019-12-06 PROCEDURE — 87205 SMEAR GRAM STAIN: CPT

## 2019-12-06 PROCEDURE — 99285 EMERGENCY DEPT VISIT HI MDM: CPT

## 2019-12-06 PROCEDURE — 85652 RBC SED RATE AUTOMATED: CPT

## 2019-12-06 PROCEDURE — 770006 HCHG ROOM/CARE - MED/SURG/GYN SEMI*

## 2019-12-06 PROCEDURE — 700111 HCHG RX REV CODE 636 W/ 250 OVERRIDE (IP): Performed by: HOSPITALIST

## 2019-12-06 PROCEDURE — 99223 1ST HOSP IP/OBS HIGH 75: CPT | Performed by: HOSPITALIST

## 2019-12-06 PROCEDURE — 85025 COMPLETE CBC W/AUTO DIFF WBC: CPT

## 2019-12-06 PROCEDURE — A9270 NON-COVERED ITEM OR SERVICE: HCPCS | Performed by: HOSPITALIST

## 2019-12-06 PROCEDURE — 80053 COMPREHEN METABOLIC PANEL: CPT

## 2019-12-06 PROCEDURE — 83605 ASSAY OF LACTIC ACID: CPT

## 2019-12-06 PROCEDURE — 700111 HCHG RX REV CODE 636 W/ 250 OVERRIDE (IP): Performed by: EMERGENCY MEDICINE

## 2019-12-06 PROCEDURE — 700102 HCHG RX REV CODE 250 W/ 637 OVERRIDE(OP): Mod: JG | Performed by: HOSPITALIST

## 2019-12-06 PROCEDURE — 96365 THER/PROPH/DIAG IV INF INIT: CPT

## 2019-12-06 PROCEDURE — 87070 CULTURE OTHR SPECIMN AEROBIC: CPT

## 2019-12-06 PROCEDURE — 700105 HCHG RX REV CODE 258: Performed by: HOSPITALIST

## 2019-12-06 PROCEDURE — 36415 COLL VENOUS BLD VENIPUNCTURE: CPT

## 2019-12-06 PROCEDURE — 86140 C-REACTIVE PROTEIN: CPT

## 2019-12-06 PROCEDURE — 87040 BLOOD CULTURE FOR BACTERIA: CPT

## 2019-12-06 PROCEDURE — 96366 THER/PROPH/DIAG IV INF ADDON: CPT

## 2019-12-06 PROCEDURE — 96367 TX/PROPH/DG ADDL SEQ IV INF: CPT

## 2019-12-06 PROCEDURE — 700105 HCHG RX REV CODE 258: Performed by: EMERGENCY MEDICINE

## 2019-12-06 RX ORDER — SENNOSIDES A AND B 8.6 MG/1
17.2 TABLET, FILM COATED ORAL DAILY
Status: DISCONTINUED | OUTPATIENT
Start: 2019-12-07 | End: 2019-12-23 | Stop reason: HOSPADM

## 2019-12-06 RX ORDER — ACETAMINOPHEN 325 MG/1
650 TABLET ORAL EVERY 6 HOURS PRN
Status: DISCONTINUED | OUTPATIENT
Start: 2019-12-06 | End: 2019-12-23 | Stop reason: HOSPADM

## 2019-12-06 RX ORDER — FLECAINIDE ACETATE 100 MG/1
25 TABLET ORAL 2 TIMES DAILY
Status: DISCONTINUED | OUTPATIENT
Start: 2019-12-06 | End: 2019-12-11

## 2019-12-06 RX ORDER — TRAZODONE HYDROCHLORIDE 50 MG/1
50 TABLET ORAL NIGHTLY PRN
COMMUNITY
End: 2021-10-23

## 2019-12-06 RX ORDER — FINASTERIDE 5 MG/1
5 TABLET, FILM COATED ORAL DAILY
Status: DISCONTINUED | OUTPATIENT
Start: 2019-12-07 | End: 2019-12-23 | Stop reason: HOSPADM

## 2019-12-06 RX ORDER — BACLOFEN 10 MG/1
10 TABLET ORAL 4 TIMES DAILY
Status: DISCONTINUED | OUTPATIENT
Start: 2019-12-06 | End: 2019-12-23 | Stop reason: HOSPADM

## 2019-12-06 RX ORDER — ENALAPRILAT 1.25 MG/ML
1.25 INJECTION INTRAVENOUS EVERY 6 HOURS PRN
Status: DISCONTINUED | OUTPATIENT
Start: 2019-12-06 | End: 2019-12-23 | Stop reason: HOSPADM

## 2019-12-06 RX ORDER — SENNOSIDES A AND B 8.6 MG/1
17.2 TABLET, FILM COATED ORAL 2 TIMES DAILY
COMMUNITY

## 2019-12-06 RX ORDER — DOCUSATE SODIUM 100 MG/1
100 CAPSULE, LIQUID FILLED ORAL 2 TIMES DAILY
Status: DISCONTINUED | OUTPATIENT
Start: 2019-12-06 | End: 2019-12-23 | Stop reason: HOSPADM

## 2019-12-06 RX ORDER — LOSARTAN POTASSIUM 25 MG/1
25 TABLET ORAL DAILY
Status: DISCONTINUED | OUTPATIENT
Start: 2019-12-07 | End: 2019-12-11

## 2019-12-06 RX ORDER — FERROUS SULFATE 325(65) MG
325 TABLET ORAL 2 TIMES DAILY
Status: DISCONTINUED | OUTPATIENT
Start: 2019-12-06 | End: 2019-12-23 | Stop reason: HOSPADM

## 2019-12-06 RX ORDER — ONDANSETRON 4 MG/1
4 TABLET, ORALLY DISINTEGRATING ORAL EVERY 4 HOURS PRN
Status: DISCONTINUED | OUTPATIENT
Start: 2019-12-06 | End: 2019-12-06

## 2019-12-06 RX ORDER — FERROUS SULFATE 325(65) MG
325 TABLET ORAL 2 TIMES DAILY
COMMUNITY

## 2019-12-06 RX ORDER — ONDANSETRON 2 MG/ML
4 INJECTION INTRAMUSCULAR; INTRAVENOUS EVERY 4 HOURS PRN
Status: DISCONTINUED | OUTPATIENT
Start: 2019-12-06 | End: 2019-12-06

## 2019-12-06 RX ORDER — TRAZODONE HYDROCHLORIDE 50 MG/1
50 TABLET ORAL NIGHTLY
Status: DISCONTINUED | OUTPATIENT
Start: 2019-12-06 | End: 2019-12-17

## 2019-12-06 RX ORDER — M-VIT,TX,IRON,MINS/CALC/FOLIC 27MG-0.4MG
1 TABLET ORAL DAILY
Status: DISCONTINUED | OUTPATIENT
Start: 2019-12-07 | End: 2019-12-23 | Stop reason: HOSPADM

## 2019-12-06 RX ADMIN — AMPICILLIN SODIUM AND SULBACTAM SODIUM 3 G: 2; 1 INJECTION, POWDER, FOR SOLUTION INTRAMUSCULAR; INTRAVENOUS at 12:43

## 2019-12-06 RX ADMIN — FLECAINIDE ACETATE 25 MG: 100 TABLET ORAL at 20:57

## 2019-12-06 RX ADMIN — TRAZODONE HYDROCHLORIDE 50 MG: 50 TABLET ORAL at 20:30

## 2019-12-06 RX ADMIN — VANCOMYCIN HYDROCHLORIDE 2100 MG: 500 INJECTION, POWDER, LYOPHILIZED, FOR SOLUTION INTRAVENOUS at 13:18

## 2019-12-06 RX ADMIN — FERROUS SULFATE TAB 325 MG (65 MG ELEMENTAL FE) 325 MG: 325 (65 FE) TAB at 20:28

## 2019-12-06 RX ADMIN — METOPROLOL TARTRATE 25 MG: 25 TABLET, FILM COATED ORAL at 20:29

## 2019-12-06 RX ADMIN — BACLOFEN 10 MG: 10 TABLET ORAL at 17:21

## 2019-12-06 RX ADMIN — DOCUSATE SODIUM 100 MG: 100 CAPSULE, LIQUID FILLED ORAL at 20:28

## 2019-12-06 RX ADMIN — BACLOFEN 10 MG: 10 TABLET ORAL at 20:30

## 2019-12-06 RX ADMIN — AMPICILLIN SODIUM AND SULBACTAM SODIUM 3 G: 2; 1 INJECTION, POWDER, FOR SOLUTION INTRAMUSCULAR; INTRAVENOUS at 20:28

## 2019-12-06 SDOH — HEALTH STABILITY: MENTAL HEALTH: HOW OFTEN DO YOU HAVE 6 OR MORE DRINKS ON ONE OCCASION?: NEVER

## 2019-12-06 SDOH — ECONOMIC STABILITY: TRANSPORTATION INSECURITY
IN THE PAST 12 MONTHS, HAS THE LACK OF TRANSPORTATION KEPT YOU FROM MEDICAL APPOINTMENTS OR FROM GETTING MEDICATIONS?: NO

## 2019-12-06 SDOH — ECONOMIC STABILITY: FOOD INSECURITY: WITHIN THE PAST 12 MONTHS, THE FOOD YOU BOUGHT JUST DIDN'T LAST AND YOU DIDN'T HAVE MONEY TO GET MORE.: NEVER TRUE

## 2019-12-06 SDOH — ECONOMIC STABILITY: FOOD INSECURITY: WITHIN THE PAST 12 MONTHS, YOU WORRIED THAT YOUR FOOD WOULD RUN OUT BEFORE YOU GOT MONEY TO BUY MORE.: NEVER TRUE

## 2019-12-06 SDOH — HEALTH STABILITY: MENTAL HEALTH: HOW OFTEN DO YOU HAVE A DRINK CONTAINING ALCOHOL?: NEVER

## 2019-12-06 SDOH — ECONOMIC STABILITY: TRANSPORTATION INSECURITY
IN THE PAST 12 MONTHS, HAS LACK OF TRANSPORTATION KEPT YOU FROM MEETINGS, WORK, OR FROM GETTING THINGS NEEDED FOR DAILY LIVING?: NO

## 2019-12-06 ASSESSMENT — COGNITIVE AND FUNCTIONAL STATUS - GENERAL
TURNING FROM BACK TO SIDE WHILE IN FLAT BAD: A LOT
HELP NEEDED FOR BATHING: TOTAL
STANDING UP FROM CHAIR USING ARMS: TOTAL
MOBILITY SCORE: 8
SUGGESTED CMS G CODE MODIFIER DAILY ACTIVITY: CK
MOVING TO AND FROM BED TO CHAIR: A LOT
TOILETING: A LOT
SUGGESTED CMS G CODE MODIFIER MOBILITY: CM
WALKING IN HOSPITAL ROOM: TOTAL
MOVING FROM LYING ON BACK TO SITTING ON SIDE OF FLAT BED: UNABLE
CLIMB 3 TO 5 STEPS WITH RAILING: TOTAL
DAILY ACTIVITIY SCORE: 19

## 2019-12-06 ASSESSMENT — LIFESTYLE VARIABLES
TOTAL SCORE: 0
AVERAGE NUMBER OF DAYS PER WEEK YOU HAVE A DRINK CONTAINING ALCOHOL: 0
DO YOU DRINK ALCOHOL: NO
ON A TYPICAL DAY WHEN YOU DRINK ALCOHOL HOW MANY DRINKS DO YOU HAVE: 0
EVER_SMOKED: YES
TOTAL SCORE: 0
CONSUMPTION TOTAL: NEGATIVE
EVER FELT BAD OR GUILTY ABOUT YOUR DRINKING: NO
HAVE PEOPLE ANNOYED YOU BY CRITICIZING YOUR DRINKING: NO
DOES PATIENT WANT TO STOP DRINKING: NO
ALCOHOL_USE: NO
HOW MANY TIMES IN THE PAST YEAR HAVE YOU HAD 5 OR MORE DRINKS IN A DAY: 0
HAVE YOU EVER FELT YOU SHOULD CUT DOWN ON YOUR DRINKING: NO
TOTAL SCORE: 0
EVER HAD A DRINK FIRST THING IN THE MORNING TO STEADY YOUR NERVES TO GET RID OF A HANGOVER: NO

## 2019-12-06 ASSESSMENT — PATIENT HEALTH QUESTIONNAIRE - PHQ9
1. LITTLE INTEREST OR PLEASURE IN DOING THINGS: NOT AT ALL
2. FEELING DOWN, DEPRESSED, IRRITABLE, OR HOPELESS: NOT AT ALL
SUM OF ALL RESPONSES TO PHQ9 QUESTIONS 1 AND 2: 0

## 2019-12-06 ASSESSMENT — ENCOUNTER SYMPTOMS
NAUSEA: 0
MYALGIAS: 0
HEADACHES: 0
CONSTIPATION: 0
SHORTNESS OF BREATH: 0
ABDOMINAL PAIN: 0
SORE THROAT: 0
DIZZINESS: 0
COUGH: 0
NERVOUS/ANXIOUS: 0
CHILLS: 0
FEVER: 0
PALPITATIONS: 0

## 2019-12-06 ASSESSMENT — COPD QUESTIONNAIRES
DO YOU EVER COUGH UP ANY MUCUS OR PHLEGM?: NO/ONLY WITH OCCASIONAL COLDS OR INFECTIONS
COPD SCREENING SCORE: 2
IN THE PAST 12 MONTHS DO YOU DO LESS THAN YOU USED TO BECAUSE OF YOUR BREATHING PROBLEMS: DISAGREE/UNSURE
HAVE YOU SMOKED AT LEAST 100 CIGARETTES IN YOUR ENTIRE LIFE: NO/DON'T KNOW
DURING THE PAST 4 WEEKS HOW MUCH DID YOU FEEL SHORT OF BREATH: NONE/LITTLE OF THE TIME

## 2019-12-06 NOTE — ED TRIAGE NOTES
Patient arrives from [facility]. Patient worsening pressure sore to sacrum. History of same. Patient reports surgery to sacral area several months ago. Patient has no fever, no complaints of pain or discomfort.     Patient is paralyzed from waist down with no sensation of pain. Patient arrives with hutchins catheter, last changed 2 weeks ago and colostomy. Patient is alert and oriented x 4.

## 2019-12-06 NOTE — ED NOTES
Medication reconciliation updated and complete per pt at bedside  Allergies have been verified and updated   No oral ABX within the last 14 days

## 2019-12-06 NOTE — ED NOTES
Patient in no apparent distress, resting comfortably on [place].  Hospitalist at bedside.

## 2019-12-06 NOTE — ED PROVIDER NOTES
ED Provider Note    CHIEF COMPLAINT  Chief Complaint   Patient presents with   • Wound Check       HPI  Osvaldo Pate is a 69 y.o. male who presents for wound check of his pelvis.  Patient has a history of quadriplegia.  He has suffered from a decubitus ulcer as well as pelvic osteomyelitis.  He was hospitalized at [facility] receiving IV antibiotics and wound care.  Subsequently discharged to the [place].  A few days ago the air cushion on his wheelchair when out and he ended up sitting for a long time on his sacrum.  He developed a wound that is been draining.  It was dressed, but had increased drainage today and he was referred to the ER.  He has not had a fever.  He has limited sensation of his pelvis and sacrum area denies significant pain.    REVIEW OF SYSTEMS  As per HPI, otherwise a 10 point review of systems is negative    PAST MEDICAL HISTORY  Past Medical History:   Diagnosis Date   • A-fib ([facility])    • Atrial flutter ([facility])    • GERD (gastroesophageal reflux disease)    • Hypertension    • Neurogenic bladder    • Quadriplegia, C5-C7 complete ([facility])        SOCIAL HISTORY  Social History     Tobacco Use   • Smoking status: Former Smoker   • Smokeless tobacco: Never Used   Substance Use Topics   • Alcohol use: No   • Drug use: No       SURGICAL HISTORY  Past Surgical History:   Procedure Laterality Date   • ULCER DEBRIDEMENT N/A 8/21/2019    Procedure: debridement of Sacral grade 4 ulcer - W/BONE BIOPSY, 3 liter wash out. bilateral sliding gluteal myocutaneous flap advancement;  Surgeon: Amadeo Moon M.D.;  Location: [facility];  Service: Plastics   • FLAP CLOSURE  8/21/2019    Procedure: CLOSURE, FLAP - MUSCLE;  Surgeon: Amadeo Moon M.D.;  Location: [facility];  Service: Plastics   • COLOSTOMY N/A 7/27/2019    Procedure: CREATION, COLOSTOMY -  placement;  Surgeon: Elías Hannah M.D.;  Location: [facility];  Service: General   • COLOSTOMY     •  "COLOSTOMY TAKEDOWN     • PERCUTANEOUOSPINNING LOWER EXTREMITY         CURRENT MEDICATIONS  Home Medications    **Home medications have not yet been reviewed for this encounter**         ALLERGIES  Allergies   Allergen Reactions   • Sulfa Drugs        PHYSICAL EXAM  VITAL SIGNS: /74   Pulse 72   Temp 36.7 °C (98 °F) (Oral)   Resp 17   Ht 1.778 m (5' 10\")   Wt 85 kg (187 lb 6.3 oz)   SpO2 94%   BMI 26.89 kg/m²    Constitutional: Awake and alert.  Pleasant quadriplegic  HENT:  Atraumatic, Normocephalic.Oropharynx dry mucus membranes, Nose normal inspection.   Eyes: Normal inspection  Neck: Supple  Cardiovascular: Normal heart rate  Thorax & Lungs: No respiratory distress  Abdomen: No tenderness, colostomy  Skin: Warm, Dry, No rash.  There is about a 1 cm opening overlying the coccyx.  There appears to be a significant cavity under the skin.  Brice exudate was expressed.  Back: No tenderness, No CVA tenderness.   Extremities: Muscle atrophy  Neurologic: Quadriplegia        Labs:  Results for orders placed or performed during the hospital encounter of 12/06/19   LACTIC ACID   Result Value Ref Range    Lactic Acid 1.0 0.5 - 2.0 mmol/L   CBC WITH DIFFERENTIAL   Result Value Ref Range    WBC 8.4 4.8 - 10.8 K/uL    RBC 3.91 (L) 4.70 - 6.10 M/uL    Hemoglobin 11.4 (L) 14.0 - 18.0 g/dL    Hematocrit 37.2 (L) 42.0 - 52.0 %    MCV 95.1 81.4 - 97.8 fL    MCH 29.2 27.0 - 33.0 pg    MCHC 30.6 (L) 33.7 - 35.3 g/dL    RDW 56.4 (H) 35.9 - 50.0 fL    Platelet Count 296 164 - 446 K/uL    MPV 8.6 (L) 9.0 - 12.9 fL    Neutrophils-Polys 74.90 (H) 44.00 - 72.00 %    Lymphocytes 13.00 (L) 22.00 - 41.00 %    Monocytes 9.40 0.00 - 13.40 %    Eosinophils 1.70 0.00 - 6.90 %    Basophils 0.50 0.00 - 1.80 %    Immature Granulocytes 0.50 0.00 - 0.90 %    Nucleated RBC 0.00 /100 WBC    Neutrophils (Absolute) 6.29 1.82 - 7.42 K/uL    Lymphs (Absolute) 1.09 1.00 - 4.80 K/uL    Monos (Absolute) 0.79 0.00 - 0.85 K/uL    Eos (Absolute) 0.14 " 0.00 - 0.51 K/uL    Baso (Absolute) 0.04 0.00 - 0.12 K/uL    Immature Granulocytes (abs) 0.04 0.00 - 0.11 K/uL    NRBC (Absolute) 0.00 K/uL   COMP METABOLIC PANEL   Result Value Ref Range    Sodium 141 135 - 145 mmol/L    Potassium 4.6 3.6 - 5.5 mmol/L    Chloride 108 96 - 112 mmol/L    Co2 23 20 - 33 mmol/L    Anion Gap 10.0 0.0 - 11.9    Glucose 92 65 - 99 mg/dL    Bun 9 8 - 22 mg/dL    Creatinine 0.58 0.50 - 1.40 mg/dL    Calcium 9.2 8.5 - 10.5 mg/dL    AST(SGOT) 19 12 - 45 U/L    ALT(SGPT) 9 2 - 50 U/L    Alkaline Phosphatase 64 30 - 99 U/L    Total Bilirubin 0.5 0.1 - 1.5 mg/dL    Albumin 3.6 3.2 - 4.9 g/dL    Total Protein 6.8 6.0 - 8.2 g/dL    Globulin 3.2 1.9 - 3.5 g/dL    A-G Ratio 1.1 g/dL   WESTERGREN SED RATE   Result Value Ref Range    Sed Rate Westergren 55 (H) 0 - 20 mm/hour   CRP QUANTITIVE (NON-CARDIAC)   Result Value Ref Range    Stat C-Reactive Protein 6.17 (H) 0.00 - 0.75 mg/dL   ESTIMATED GFR   Result Value Ref Range    GFR If African American >60 >60 mL/min/1.73 m 2    GFR If Non African American >60 >60 mL/min/1.73 m 2       Medications   vancomycin (VANCOCIN) 2,100 mg in  mL IVPB (2,100 mg Intravenous New Bag 12/6/19 1318)   ampicillin/sulbactam (UNASYN) 3 g in  mL IVPB (0 g Intravenous Stopped 12/6/19 1313)       COURSE & MEDICAL DECISION MAKING  Patient presents with a draining decubitus ulcer.  History of osteomyelitis of the coccyx.  I suspect this is recurrent.  Patient is given vancomycin and Unasyn.  Data ordered.    Inflammatory markers are elevated.  Patient will be hospitalized for IV antibiotics and further evaluation/treatment of infection.  I consulted Dr. Boudreaux.    FINAL IMPRESSION  1.  Infected decubitus ulcer with suspected osteomyelitis      This dictation was created using voice recognition software. The accuracy of the dictation is limited to the abilities of the software.  The nursing notes were reviewed and certain aspects of this information were incorporated  into this note.      Electronically signed by: Doc Lucero, 12/6/2019 1:24 PM

## 2019-12-06 NOTE — TELEPHONE ENCOUNTER
Dr. Mehta asked me to call pt to schedule w/Dr. Josue before leave. I let Dr. Mehta know patient is currently at [facility].

## 2019-12-06 NOTE — ED NOTES
Patient in room with monitors attached and functioning. No acute distress noted. Bed is in low and locked position. Patient states no needs. Will continue to monitor patient.       Offered to reposition patient.

## 2019-12-07 ENCOUNTER — APPOINTMENT (OUTPATIENT)
Dept: RADIOLOGY | Facility: MEDICAL CENTER | Age: 69
DRG: 477 | End: 2019-12-07
Attending: INTERNAL MEDICINE
Payer: MEDICARE

## 2019-12-07 LAB
ANION GAP SERPL CALC-SCNC: 6 MMOL/L (ref 0–11.9)
BASOPHILS # BLD AUTO: 0.5 % (ref 0–1.8)
BASOPHILS # BLD: 0.03 K/UL (ref 0–0.12)
BUN SERPL-MCNC: 8 MG/DL (ref 8–22)
CALCIUM SERPL-MCNC: 8.7 MG/DL (ref 8.5–10.5)
CHLORIDE SERPL-SCNC: 112 MMOL/L (ref 96–112)
CO2 SERPL-SCNC: 26 MMOL/L (ref 20–33)
CREAT SERPL-MCNC: 0.63 MG/DL (ref 0.5–1.4)
EOSINOPHIL # BLD AUTO: 0.2 K/UL (ref 0–0.51)
EOSINOPHIL NFR BLD: 3.5 % (ref 0–6.9)
ERYTHROCYTE [DISTWIDTH] IN BLOOD BY AUTOMATED COUNT: 54.9 FL (ref 35.9–50)
GLUCOSE SERPL-MCNC: 90 MG/DL (ref 65–99)
HCT VFR BLD AUTO: 33.4 % (ref 42–52)
HGB BLD-MCNC: 10.6 G/DL (ref 14–18)
IMM GRANULOCYTES # BLD AUTO: 0.02 K/UL (ref 0–0.11)
IMM GRANULOCYTES NFR BLD AUTO: 0.3 % (ref 0–0.9)
LYMPHOCYTES # BLD AUTO: 1.22 K/UL (ref 1–4.8)
LYMPHOCYTES NFR BLD: 21.3 % (ref 22–41)
MCH RBC QN AUTO: 29.9 PG (ref 27–33)
MCHC RBC AUTO-ENTMCNC: 31.7 G/DL (ref 33.7–35.3)
MCV RBC AUTO: 94.1 FL (ref 81.4–97.8)
MONOCYTES # BLD AUTO: 0.6 K/UL (ref 0–0.85)
MONOCYTES NFR BLD AUTO: 10.5 % (ref 0–13.4)
NEUTROPHILS # BLD AUTO: 3.67 K/UL (ref 1.82–7.42)
NEUTROPHILS NFR BLD: 63.9 % (ref 44–72)
NRBC # BLD AUTO: 0 K/UL
NRBC BLD-RTO: 0 /100 WBC
PLATELET # BLD AUTO: 270 K/UL (ref 164–446)
PMV BLD AUTO: 8.7 FL (ref 9–12.9)
POTASSIUM SERPL-SCNC: 3.9 MMOL/L (ref 3.6–5.5)
RBC # BLD AUTO: 3.55 M/UL (ref 4.7–6.1)
SODIUM SERPL-SCNC: 144 MMOL/L (ref 135–145)
WBC # BLD AUTO: 5.7 K/UL (ref 4.8–10.8)

## 2019-12-07 PROCEDURE — 700102 HCHG RX REV CODE 250 W/ 637 OVERRIDE(OP): Performed by: HOSPITALIST

## 2019-12-07 PROCEDURE — 80048 BASIC METABOLIC PNL TOTAL CA: CPT

## 2019-12-07 PROCEDURE — 770006 HCHG ROOM/CARE - MED/SURG/GYN SEMI*

## 2019-12-07 PROCEDURE — 85025 COMPLETE CBC W/AUTO DIFF WBC: CPT

## 2019-12-07 PROCEDURE — 99223 1ST HOSP IP/OBS HIGH 75: CPT | Performed by: INTERNAL MEDICINE

## 2019-12-07 PROCEDURE — 72193 CT PELVIS W/DYE: CPT

## 2019-12-07 PROCEDURE — A9270 NON-COVERED ITEM OR SERVICE: HCPCS | Performed by: HOSPITALIST

## 2019-12-07 PROCEDURE — 700105 HCHG RX REV CODE 258: Performed by: HOSPITALIST

## 2019-12-07 PROCEDURE — 700117 HCHG RX CONTRAST REV CODE 255: Performed by: INTERNAL MEDICINE

## 2019-12-07 PROCEDURE — 700112 HCHG RX REV CODE 229: Performed by: HOSPITALIST

## 2019-12-07 PROCEDURE — 99233 SBSQ HOSP IP/OBS HIGH 50: CPT | Performed by: INTERNAL MEDICINE

## 2019-12-07 PROCEDURE — 700111 HCHG RX REV CODE 636 W/ 250 OVERRIDE (IP): Performed by: HOSPITALIST

## 2019-12-07 PROCEDURE — 36415 COLL VENOUS BLD VENIPUNCTURE: CPT

## 2019-12-07 RX ADMIN — FERROUS SULFATE TAB 325 MG (65 MG ELEMENTAL FE) 325 MG: 325 (65 FE) TAB at 05:24

## 2019-12-07 RX ADMIN — AMPICILLIN SODIUM AND SULBACTAM SODIUM 3 G: 2; 1 INJECTION, POWDER, FOR SOLUTION INTRAMUSCULAR; INTRAVENOUS at 23:44

## 2019-12-07 RX ADMIN — BACLOFEN 10 MG: 10 TABLET ORAL at 14:16

## 2019-12-07 RX ADMIN — BACLOFEN 10 MG: 10 TABLET ORAL at 08:09

## 2019-12-07 RX ADMIN — METOPROLOL TARTRATE 25 MG: 25 TABLET, FILM COATED ORAL at 05:26

## 2019-12-07 RX ADMIN — FLECAINIDE ACETATE 25 MG: 100 TABLET ORAL at 18:03

## 2019-12-07 RX ADMIN — LOSARTAN POTASSIUM 25 MG: 25 TABLET ORAL at 05:25

## 2019-12-07 RX ADMIN — AMPICILLIN SODIUM AND SULBACTAM SODIUM 3 G: 2; 1 INJECTION, POWDER, FOR SOLUTION INTRAMUSCULAR; INTRAVENOUS at 00:38

## 2019-12-07 RX ADMIN — FINASTERIDE 5 MG: 5 TABLET, FILM COATED ORAL at 05:24

## 2019-12-07 RX ADMIN — RIVAROXABAN 10 MG: 10 TABLET, FILM COATED ORAL at 18:03

## 2019-12-07 RX ADMIN — BACLOFEN 10 MG: 10 TABLET ORAL at 20:27

## 2019-12-07 RX ADMIN — IOHEXOL 90 ML: 350 INJECTION, SOLUTION INTRAVENOUS at 15:28

## 2019-12-07 RX ADMIN — METOPROLOL TARTRATE 25 MG: 25 TABLET, FILM COATED ORAL at 18:02

## 2019-12-07 RX ADMIN — MULTIPLE VITAMINS W/ MINERALS TAB 1 TABLET: TAB at 05:25

## 2019-12-07 RX ADMIN — FERROUS SULFATE TAB 325 MG (65 MG ELEMENTAL FE) 325 MG: 325 (65 FE) TAB at 18:02

## 2019-12-07 RX ADMIN — AMPICILLIN SODIUM AND SULBACTAM SODIUM 3 G: 2; 1 INJECTION, POWDER, FOR SOLUTION INTRAMUSCULAR; INTRAVENOUS at 18:09

## 2019-12-07 RX ADMIN — FLECAINIDE ACETATE 25 MG: 100 TABLET ORAL at 05:25

## 2019-12-07 RX ADMIN — VANCOMYCIN HYDROCHLORIDE 1300 MG: 500 INJECTION, POWDER, LYOPHILIZED, FOR SOLUTION INTRAVENOUS at 14:16

## 2019-12-07 RX ADMIN — DOCUSATE SODIUM 100 MG: 100 CAPSULE, LIQUID FILLED ORAL at 18:03

## 2019-12-07 RX ADMIN — AMPICILLIN SODIUM AND SULBACTAM SODIUM 3 G: 2; 1 INJECTION, POWDER, FOR SOLUTION INTRAMUSCULAR; INTRAVENOUS at 12:26

## 2019-12-07 RX ADMIN — VANCOMYCIN HYDROCHLORIDE 1300 MG: 500 INJECTION, POWDER, LYOPHILIZED, FOR SOLUTION INTRAVENOUS at 01:16

## 2019-12-07 RX ADMIN — TRAZODONE HYDROCHLORIDE 50 MG: 50 TABLET ORAL at 20:27

## 2019-12-07 RX ADMIN — DOCUSATE SODIUM 100 MG: 100 CAPSULE, LIQUID FILLED ORAL at 05:24

## 2019-12-07 RX ADMIN — AMPICILLIN SODIUM AND SULBACTAM SODIUM 3 G: 2; 1 INJECTION, POWDER, FOR SOLUTION INTRAMUSCULAR; INTRAVENOUS at 05:24

## 2019-12-07 RX ADMIN — BACLOFEN 10 MG: 10 TABLET ORAL at 18:02

## 2019-12-07 ASSESSMENT — ENCOUNTER SYMPTOMS
FEVER: 0
CHILLS: 0
ABDOMINAL PAIN: 0
VOMITING: 0
DIARRHEA: 0
SHORTNESS OF BREATH: 0
SENSORY CHANGE: 1
NAUSEA: 0
COUGH: 0

## 2019-12-07 NOTE — ASSESSMENT & PLAN NOTE
-persistently elevated now  -start losartan 25 mg daily  -make sure hutchins catheter remains patent to avoid autonomic dysreflexia  -remains somewhat fluctuant without above issue

## 2019-12-07 NOTE — CARE PLAN
Problem: Safety  Goal: Will remain free from falls  Outcome: PROGRESSING AS EXPECTED    Patient bed alarm on as needed, nonslip socks on, call light in reach, bed in low position     Problem: Infection  Goal: Will remain free from infection  Outcome: PROGRESSING AS EXPECTED    Patient vitals, I/os, mentation, and labs monitored throughout shift

## 2019-12-07 NOTE — ASSESSMENT & PLAN NOTE
,-underwent debridement by LPS POD#4  -stable clinically, again, no changes planned  -CT pelvis showed no new lesions or advancing bleeding  -restart xarelto  -no need for PCC products at this time  -replace wound vac tomorrow?  No osteomyelitis on biopsy  Biopsy culture negative for OM  Repeat CT shows no remaining abscess   ID recommended oral antibiotics until 1/4/2020  On augmentin  On doxycycline  -tolerating both agents without issue  --no further bleeding noted  -Dr Rodriguez of plastics revisited wound last night and feels there is no surgical intervention warranted at this time  -monitor  -remains afebrile

## 2019-12-07 NOTE — ASSESSMENT & PLAN NOTE
"\"Noted on initial EKG  Avoid QT prolonging medications\"  Cardiology input is noted  flecanide is off  Repeat ekg  Cardiology input is noted        "

## 2019-12-07 NOTE — ASSESSMENT & PLAN NOTE
-restart xarelto, monitor closely for bleeding  Heart rate is controlled  As per cardiology will need to follow up as outpt.

## 2019-12-07 NOTE — CONSULTS
INFECTIOUS DISEASES INPATIENT CONSULT NOTE     Date of Service: 12/7/2019    Consult Requested By: Jesus Jones M.D.    Reason for Consultation: Sacral decubitus wound    History of Present Illness:   Osvaldo Pate is a 69 y.o. man previously seen by the ID service with a history of C5-7 paraplegia, stage IV sacral decubitus ulcer complicated by sacral osteomyelitis status post debridement and diverting colostomy in July 2019 status post completion of antibiotics on 10/1/2019 admitted 12/6/2019 for drainage from his sacral wound.  Patient has a longstanding history of a stage IV sacral decubitus ulcer.  He underwent a diverting colostomy on 07/27/2019 by Dr. Persaud.  On 8/21/2019, he underwent debridement with bone biopsies by Dr. Moon.  Pathology was consistent with acute osteomyelitis.  Cultures grew MRSE.  He completed a 6-week course of vancomycin, ceftriaxone and Flagyl through 10/1/2019 at [facility].  After the patient was discharged from [facility], he was transferred to [facility] where he currently resides.  When he was discharged from [facility], the patient states he received a new wheelchair with a new wheelchair cushion.  He was doing well in his usual state of health at [facility], when he noted that his wheelchair cushion deflated approximately 2 weeks prior to admission.  Since then, he has developed a small wound in his sacrum that has now been draining serous to brown discharge.  Patient gets wound care at [facility] and per the patient, nursing care has not noted any erythema.  He denies any associated fevers or chills.  No nausea, vomiting, abdominal pain or diarrhea.  No cough, chest pain or shortness of breath.  He has not been on any antibiotics in the past 30 days.  Given concerns for worsening infection, he was transferred to the ED for further evaluation and management.  On presentation, he was afebrile without any leukocytosis.  No imaging was done.  ESR is 55.  Wound cultures were  obtained and the patient was placed on Unasyn and vancomycin.  Plastic surgery has been contacted to evaluate the patient.  Infectious disease service consulted for further antibiotic recommendations and management.      Review of Systems   Constitutional: Negative for chills and fever.   Respiratory: Negative for cough and shortness of breath.    Cardiovascular: Negative for chest pain.   Gastrointestinal: Negative for abdominal pain, diarrhea, nausea and vomiting.   Neurological: Positive for sensory change.   Limited review of systems as patient is a quadriplegic    PMH:   Past Medical History:   Diagnosis Date   • A-fib ([facility])    • Atrial flutter ([facility])    • GERD (gastroesophageal reflux disease)    • Hypertension    • Neurogenic bladder    • Quadriplegia, C5-C7 complete ([facility])    History of sacral osteomyelitis    PSH:  Past Surgical History:   Procedure Laterality Date   • ULCER DEBRIDEMENT N/A 8/21/2019    Procedure: debridement of Sacral grade 4 ulcer - W/BONE BIOPSY, 3 liter wash out. bilateral sliding gluteal myocutaneous flap advancement;  Surgeon: Amadeo Moon M.D.;  Location: SURGERY [facility];  Service: Plastics   • FLAP CLOSURE  8/21/2019    Procedure: CLOSURE, FLAP - MUSCLE;  Surgeon: Amadeo Moon M.D.;  Location: [facility];  Service: Plastics   • COLOSTOMY N/A 7/27/2019    Procedure: CREATION, COLOSTOMY -  placement;  Surgeon: Elías Hannah M.D.;  [facility];  Service: General   • COLOSTOMY     • COLOSTOMY TAKEDOWN     • HERNIA REPAIR     • PERCUTANEOUOSPINNING LOWER EXTREMITY         FAMILY HX:  Family History   Problem Relation Age of Onset   • Heart Disease Father        SOCIAL HX:  Social History     Socioeconomic History   • Marital status:      Spouse name: Not on file   • Number of children: Not on file   • Years of education: Not on file   • Highest education level: Not on file   Occupational History   • Not on file   Social  Needs   • Financial resource strain: Not on file   • Food insecurity:     Worry: Never true     Inability: Never true   • Transportation needs:     Medical: No     Non-medical: No   Tobacco Use   • Smoking status: Former Smoker     Packs/day: 1.00     Types: Cigarettes     Start date: 1967     Last attempt to quit: 1977     Years since quittin.9   • Smokeless tobacco: Never Used   Substance and Sexual Activity   • Alcohol use: No     Frequency: Never     Binge frequency: Never   • Drug use: No   • Sexual activity: Not on file   Lifestyle   • Physical activity:     Days per week: Not on file     Minutes per session: Not on file   • Stress: Not on file   Relationships   • Social connections:     Talks on phone: Not on file     Gets together: Not on file     Attends Judaism service: Not on file     Active member of club or organization: Not on file     Attends meetings of clubs or organizations: Not on file     Relationship status: Not on file   • Intimate partner violence:     Fear of current or ex partner: Not on file     Emotionally abused: Not on file     Physically abused: Not on file     Forced sexual activity: Not on file   Other Topics Concern   • Not on file   Social History Narrative   • Not on file     Social History     Tobacco Use   Smoking Status Former Smoker   • Packs/day: 1.00   • Types: Cigarettes   • Start date: 1967   • Last attempt to quit: 1977   • Years since quittin.9   Smokeless Tobacco Never Used     Social History     Substance and Sexual Activity   Alcohol Use No   • Frequency: Never   • Binge frequency: Never       Allergies/Intolerances:  Allergies   Allergen Reactions   • Sulfa Drugs Rash     Rash         History reviewed with the patient     Other Current Medications:    Current Facility-Administered Medications:   •  baclofen (LIORESAL) tablet 10 mg, 10 mg, Oral, 4X/DAY, Lex Boudreaux DDenizODeniz, 10 mg at 19  •  docusate sodium (COLACE) capsule 100 mg,  "100 mg, Oral, BID, IMELDA VenegasODeniz, 100 mg at 1924  •  ferrous sulfate tablet 325 mg, 325 mg, Oral, BID, IMELDA VenegasODeniz, 325 mg at 1924  •  finasteride (PROSCAR) tablet 5 mg, 5 mg, Oral, DAILY, IMELDA VenegasODeniz, 5 mg at 1924  •  flecainide (TAMBOCOR) tablet 25 mg, 25 mg, Oral, BID, IMELDA VenegasO., 25 mg at 19  •  losartan (COZAAR) tablet 25 mg, 25 mg, Oral, DAILY, IMELDA VenegasODeniz, 25 mg at 19  •  metoprolol (LOPRESSOR) tablet 25 mg, 25 mg, Oral, BID, IMELDA VenegasODeniz, 25 mg at 19  •  rivaroxaban (XARELTO) tablet 10 mg, 10 mg, Oral, PM MEAL, IMELDA VenegasO.  •  sennosides (SENOKOT) 8.6 MG tablet 17.2 mg, 17.2 mg, Oral, DAILY, IMELDA VenegasO.  •  therapeutic multivitamin-minerals (THERAGRAN-M) tablet 1 Tab, 1 Tab, Oral, DAILY, IMELDA VenegasODeniz, 1 Tab at 19  •  traZODone (DESYREL) tablet 50 mg, 50 mg, Oral, Nightly, IMELDA VenegasODeniz, 50 mg at 19 2030  •  acetaminophen (TYLENOL) tablet 650 mg, 650 mg, Oral, Q6HRS PRN, Lex Boudreaux D.O.  •  enalaprilat (VASOTEC) injection 1.25 mg, 1.25 mg, Intravenous, Q6HRS PRN, IMELDA VenegasO.  •  ampicillin/sulbactam (UNASYN) 3 g in  mL IVPB, 3 g, Intravenous, Q6HRS, IMELDA VenegasODeniz, Stopped at 19 0554  •  MD Alert...Vancomycin per Pharmacy, , Other, PHARMACY TO DOSE, Lex Boudreaux D.O.  •  vancomycin (VANCOCIN) 1,300 mg in  mL IVPB, 1,300 mg, Intravenous, Q12HR, Lex Boudreaux D.O., Stopped at 19 0316  [unfilled]    Most Recent Vital Signs:  /83   Pulse 72   Temp 36.3 °C (97.4 °F) (Temporal)   Resp 18   Ht 1.778 m (5' 10\")   Wt 84.3 kg (185 lb 13.6 oz)   SpO2 97%   BMI 26.67 kg/m²   Temp  Av.6 °C (97.9 °F)  Min: 36.3 °C (97.4 °F)  Max: 36.9 °C (98.4 °F)    Physical Exam:  General: well nourished, no diaphoresis, well-appearing, no acute distress, nontoxic  HEENT: sclera anicteric, PERRL, " extraocular muscles intact, mucous membranes moist, oropharynx clear and moist, no oral lesions or exudate  Neck: supple, no lymphadenopathy  Chest: CTAB, no rales, rhonchi or wheezes, normal work of breathing.  Cardiac: regular rate and rhythm, normal S1 S2, no murmurs, rubs or gallops  Abdomen: + bowel sounds, soft, non-tender, non-distended, no hepatosplenomegaly.  Ostomy bag  : Wound care photos reviewed in epic.  There is a small sacral wound with serous drainage.  Per bedside nurse, wound was examined today and wound has brown drainage and tunnels superiorly  Extremities: WWP, bilateral lower extremity pedal edema, 2+ pedal pulses.  Bilateral lower extremity muscle atrophy  Skin: warm and dry, no rashes or worrisome lesions  Neuro: Alert and oriented times 3, speech fluent, quadriplegia   Psych: normal mood and behavior, pleasant; memory intact, normal judgement    Pertinent Lab Results:  Recent Labs     12/06/19  1213 12/07/19  0143   WBC 8.4 5.7      Recent Labs     12/06/19  1213 12/07/19  0143   HEMOGLOBIN 11.4* 10.6*   HEMATOCRIT 37.2* 33.4*   MCV 95.1 94.1   MCH 29.2 29.9   PLATELETCT 296 270         Recent Labs     12/06/19  1213 12/07/19  0143   SODIUM 141 144   POTASSIUM 4.6 3.9   CHLORIDE 108 112   CO2 23 26   CREATININE 0.58 0.63        Recent Labs     12/06/19  1213   ALBUMIN 3.6        Pertinent Micro:  Results     Procedure Component Value Units Date/Time    CULTURE WOUND W/ GRAM STAIN [[id number]]     Order Status:  No result Specimen:  Wound from Back     GRAM STAIN [[id number]] Collected:  12/06/19 1234    Order Status:  Completed Specimen:  Wound Updated:  12/06/19 1552     Significant Indicator .     Source WND     Site DECUBITUS     Gram Stain Result Many WBCs.  Rare Gram positive cocci.      CULTURE WOUND W/ GRAM STAIN [[id number]] Collected:  12/06/19 1234    Order Status:  Completed Specimen:  Wound from Decubitus Updated:  12/06/19 1242    BLOOD CULTURE [[id number]] Collected:  12/06/19  "1225    Order Status:  Completed Specimen:  Blood from Peripheral Updated:  12/06/19 1240    Narrative:       Per Hospital Policy: Only change Specimen Src: to \"Line\" if  specified by physician order.    BLOOD CULTURE [[id number]] Collected:  12/06/19 1213    Order Status:  Completed Specimen:  Blood from Peripheral Updated:  12/06/19 1236    Narrative:       Per Hospital Policy: Only change Specimen Src: to \"Line\" if  specified by physician order.        No results found for: BLOODCULTU, BLDCULT, BCHOLD     Studies:  No results found.    IMPRESSION:   1.  Sacral wound   2.  History of sacral osteomyelitis  3.  C5-7 quadriplegia      PLAN:   Osvaldo Pate is a 69 y.o. man well-known to the ID service with a history of C5-7 paraplegia, and history of a stage IV sacral decubitus ulcer complicated by sacral osteomyelitis status post diverting colostomy in July and debridement down to bone in August by Dr. Ansari with pathology positive for acute osteomyelitis and skin flap admitted for sacral wound drainage concerning for infection.  Recommend CT of the pelvis to evaluate for underlying abscess or new osteomyelitis.  Fortunately the patient is unable to get an MRI given metal rods in his lower extremities.  Wound cultures have already been obtained and are currently pending.  Agree with vancomycin and Unasyn for now pending wound cultures.  Monitor renal function and Vanco trough.  Plastic surgery evaluation pending.  Further recommendations per hospital course and culture results.      Plan of care discussed with SHONA Jones M.D.. Will continue to follow    Leny Rios M.D.      Please note that this dictation was created using voice recognition software. I have worked with technical experts from [employer] to optimize the interface.  I have made every reasonable attempt to correct obvious errors, but there may be errors of grammar and possibly content that I did not discover before finalizing " the note.

## 2019-12-07 NOTE — PROGRESS NOTES
"Pharmacy Kinetics 69 y.o. male on vancomycin day #1 2019    Received vancomycin loading dose    Indication for Treatment: SSTI    Pertinent history per medical record: Admitted on 2019. Presents for wound check of his pelvis.  Patient has a history of quadriplegia.  He has suffered from a decubitus ulcer as well as pelvic osteomyelitis.  He was hospitalized at [facility] receiving IV antibiotics and wound care.  Subsequently discharged to the [place].  A few days ago the air cushion on his wheelchair when out and he ended up sitting for a long time on his sacrum.  He developed a wound that is been draining.  It was dressed, but had increased drainage today and he was referred to the ER.  He has not had a fever.  He has limited sensation of his pelvis and sacrum area denies significant pain.    Other antibiotics: Unasyn    Allergies: Sulfa drugs     List concerns for renal function: quadriplegia, SCr slightly higher than baseline    Pertinent cultures to date:   Hx of MRSA  19 blood cultures x2, wound culture - in process    Recent Labs     19  1213   WBC 8.4   NEUTSPOLYS 74.90*     Recent Labs     19  1213   BUN 9   CREATININE 0.58   ALBUMIN 3.6     No results for input(s): VANCOTROUGH, VANCOPEAK, VANCORANDOM in the last 72 hours.No intake or output data in the 24 hours ending 19 1647   /93   Pulse 74   Temp 36.7 °C (98 °F) (Oral)   Resp 16   Ht 1.778 m (5' 10\")   Wt 85 kg (187 lb 6.3 oz)   SpO2 97%  Temp (24hrs), Av.7 °C (98 °F), Min:36.7 °C (98 °F), Max:36.7 °C (98 °F)      A/P   1. Vancomycin dose change: Start vancomycin 1300mg q12hr   2. Next vancomycin level: 2-3 days (not ordered)  3. Goal trough: 12-16 mcg/ml - target high end of goal  4. Comments: Patient was on vancomycin last admission to [facility] this year. Had a therapeutic trough of 13 on 1300mg q12hr. Will start this dose and monitor levels closely. Monitor renal (SCr may not be indicative of renal " function as quadriplegic). Pharmacy will follow. Narrow therapy as able.    Tin Manrique, PharmD, BCPS, BCCCP
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
## 2019-12-07 NOTE — PROGRESS NOTES
"Pharmacy Kinetics 69 y.o. male on vancomycin day # 2   2019    Currently on Vancomycin 1300mg q12hr (0100 1300)  Provider specified end date: TBD    Indication for Treatment: SSTI    Pertinent history per medical record: Admitted on 2019 for Decubitus ulcer of coccyx. 69 y.o. male history of quadriplegia.  He has suffered from a decubitus ulcer as well as pelvic osteomyelitis.     Other antibiotics: Unasyn 3g iv q6hr     Allergies: Sulfa drugs     List concerns for renal function: quadriplegia    Pertinent cultures to date:   19:PBCx2:NGTD  19:Decubitus,WND:Rare Gram positive cocci.     MRSA nares swab if pneumonia is a concern (ordered/positive/negative/n-a): NA    Recent Labs     19  1213 19  0143   WBC 8.4 5.7   NEUTSPOLYS 74.90* 63.90     Recent Labs     19  1213 19  0143   BUN 9 8   CREATININE 0.58 0.63   ALBUMIN 3.6  --      No results for input(s): VANCOTROUGH, VANCOPEAK, VANCORANDOM in the last 72 hours.    Intake/Output Summary (Last 24 hours) at 2019 0909  Last data filed at 2019 0600  Gross per 24 hour   Intake 350 ml   Output 2000 ml   Net -1650 ml      /83   Pulse 72   Temp 36.3 °C (97.4 °F) (Temporal)   Resp 18   Ht 1.778 m (5' 10\")   Wt 84.3 kg (185 lb 13.6 oz)   SpO2 97%  Temp (24hrs), Av.6 °C (97.9 °F), Min:36.3 °C (97.4 °F), Max:36.9 °C (98.4 °F)      A/P        1. Vancomycin dose change: No change  2. Next vancomycin level: 19@0030  3. Goal trough: 12-16 mcg/mL  4. Comments: No leukocytosis , afebrile, cx listed above. SCr may not accurately est renal fx given quadriplegia, following levels closely. Level ordered.     Alfa Laughlin PharmD BCPS   "

## 2019-12-07 NOTE — PROGRESS NOTES
2 RN skin check complete with Justin  Devices in place Float boots  Skin assessed under devices Yes  Confirmed pressure ulcers found on coccyx, black toes left  New potential pressure ulcers noted on NA. Wound consult placed Yes  The following interventions in place q2 turns, q2 skin checks, wound care as ordered after seen by wound

## 2019-12-07 NOTE — CARE PLAN
Problem: Safety  Goal: Will remain free from falls  Outcome: PROGRESSING AS EXPECTED     Problem: Skin Integrity  Goal: Risk for impaired skin integrity will decrease  Outcome: PROGRESSING AS EXPECTED
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
## 2019-12-08 ENCOUNTER — APPOINTMENT (OUTPATIENT)
Dept: RADIOLOGY | Facility: MEDICAL CENTER | Age: 69
DRG: 477 | End: 2019-12-08
Attending: INTERNAL MEDICINE
Payer: MEDICARE

## 2019-12-08 LAB
ANION GAP SERPL CALC-SCNC: 6 MMOL/L (ref 0–11.9)
APPEARANCE FLD: NORMAL
APTT PPP: 38.7 SEC (ref 24.7–36)
BACTERIA WND AEROBE CULT: NORMAL
BODY FLD TYPE: NORMAL
BUN SERPL-MCNC: 11 MG/DL (ref 8–22)
CALCIUM SERPL-MCNC: 8.7 MG/DL (ref 8.5–10.5)
CHLORIDE SERPL-SCNC: 111 MMOL/L (ref 96–112)
CO2 SERPL-SCNC: 25 MMOL/L (ref 20–33)
COLOR FLD: NORMAL
CREAT SERPL-MCNC: 0.59 MG/DL (ref 0.5–1.4)
CSF COMMENTS 1658: NORMAL
ERYTHROCYTE [DISTWIDTH] IN BLOOD BY AUTOMATED COUNT: 53.6 FL (ref 35.9–50)
GLUCOSE SERPL-MCNC: 96 MG/DL (ref 65–99)
GRAM STN SPEC: NORMAL
GRAM STN SPEC: NORMAL
HCT VFR BLD AUTO: 34 % (ref 42–52)
HGB BLD-MCNC: 11 G/DL (ref 14–18)
INR PPP: 1.36 (ref 0.87–1.13)
LYMPHOCYTES NFR FLD: 2 %
MCH RBC QN AUTO: 29.9 PG (ref 27–33)
MCHC RBC AUTO-ENTMCNC: 32.4 G/DL (ref 33.7–35.3)
MCV RBC AUTO: 92.4 FL (ref 81.4–97.8)
MONONUC CELLS NFR FLD: 3 %
NEUTROPHILS NFR FLD: 95 %
PLATELET # BLD AUTO: 241 K/UL (ref 164–446)
PMV BLD AUTO: 8.5 FL (ref 9–12.9)
POTASSIUM SERPL-SCNC: 3.9 MMOL/L (ref 3.6–5.5)
PROTHROMBIN TIME: 17.1 SEC (ref 12–14.6)
RBC # BLD AUTO: 3.68 M/UL (ref 4.7–6.1)
RBC # FLD: NORMAL CELLS/UL
SIGNIFICANT IND 70042: NORMAL
SIGNIFICANT IND 70042: NORMAL
SITE SITE: NORMAL
SITE SITE: NORMAL
SODIUM SERPL-SCNC: 142 MMOL/L (ref 135–145)
SOURCE SOURCE: NORMAL
SOURCE SOURCE: NORMAL
VANCOMYCIN TROUGH SERPL-MCNC: 17 UG/ML (ref 10–20)
WBC # BLD AUTO: 5.9 K/UL (ref 4.8–10.8)
WBC # FLD: NORMAL CELLS/UL

## 2019-12-08 PROCEDURE — 0Q9130Z DRAINAGE OF SACRUM WITH DRAINAGE DEVICE, PERCUTANEOUS APPROACH: ICD-10-PCS | Performed by: RADIOLOGY

## 2019-12-08 PROCEDURE — 20225 BONE BIOPSY TROCAR/NDL DEEP: CPT

## 2019-12-08 PROCEDURE — A9270 NON-COVERED ITEM OR SERVICE: HCPCS | Performed by: HOSPITALIST

## 2019-12-08 PROCEDURE — 89051 BODY FLUID CELL COUNT: CPT

## 2019-12-08 PROCEDURE — 85730 THROMBOPLASTIN TIME PARTIAL: CPT

## 2019-12-08 PROCEDURE — 0QB13ZX EXCISION OF SACRUM, PERCUTANEOUS APPROACH, DIAGNOSTIC: ICD-10-PCS | Performed by: RADIOLOGY

## 2019-12-08 PROCEDURE — 87205 SMEAR GRAM STAIN: CPT | Mod: 91

## 2019-12-08 PROCEDURE — 700111 HCHG RX REV CODE 636 W/ 250 OVERRIDE (IP): Performed by: HOSPITALIST

## 2019-12-08 PROCEDURE — 4410111 CT-DRAIN-PERITONEAL

## 2019-12-08 PROCEDURE — 88311 DECALCIFY TISSUE: CPT

## 2019-12-08 PROCEDURE — 700102 HCHG RX REV CODE 250 W/ 637 OVERRIDE(OP): Performed by: HOSPITALIST

## 2019-12-08 PROCEDURE — 700111 HCHG RX REV CODE 636 W/ 250 OVERRIDE (IP): Performed by: RADIOLOGY

## 2019-12-08 PROCEDURE — 770006 HCHG ROOM/CARE - MED/SURG/GYN SEMI*

## 2019-12-08 PROCEDURE — 88304 TISSUE EXAM BY PATHOLOGIST: CPT

## 2019-12-08 PROCEDURE — 88307 TISSUE EXAM BY PATHOLOGIST: CPT

## 2019-12-08 PROCEDURE — 87015 SPECIMEN INFECT AGNT CONCNTJ: CPT

## 2019-12-08 PROCEDURE — 700112 HCHG RX REV CODE 229: Performed by: HOSPITALIST

## 2019-12-08 PROCEDURE — 700111 HCHG RX REV CODE 636 W/ 250 OVERRIDE (IP): Performed by: INTERNAL MEDICINE

## 2019-12-08 PROCEDURE — 700105 HCHG RX REV CODE 258: Performed by: INTERNAL MEDICINE

## 2019-12-08 PROCEDURE — 85027 COMPLETE CBC AUTOMATED: CPT

## 2019-12-08 PROCEDURE — 80048 BASIC METABOLIC PNL TOTAL CA: CPT

## 2019-12-08 PROCEDURE — 85610 PROTHROMBIN TIME: CPT

## 2019-12-08 PROCEDURE — 700105 HCHG RX REV CODE 258: Performed by: HOSPITALIST

## 2019-12-08 PROCEDURE — 99233 SBSQ HOSP IP/OBS HIGH 50: CPT | Performed by: INTERNAL MEDICINE

## 2019-12-08 PROCEDURE — 80202 ASSAY OF VANCOMYCIN: CPT

## 2019-12-08 PROCEDURE — 36415 COLL VENOUS BLD VENIPUNCTURE: CPT

## 2019-12-08 PROCEDURE — 700111 HCHG RX REV CODE 636 W/ 250 OVERRIDE (IP)

## 2019-12-08 PROCEDURE — 87070 CULTURE OTHR SPECIMN AEROBIC: CPT | Mod: 91

## 2019-12-08 RX ORDER — MIDAZOLAM HYDROCHLORIDE 1 MG/ML
.5-2 INJECTION INTRAMUSCULAR; INTRAVENOUS PRN
Status: ACTIVE | OUTPATIENT
Start: 2019-12-08 | End: 2019-12-08

## 2019-12-08 RX ORDER — MIDAZOLAM HYDROCHLORIDE 1 MG/ML
INJECTION INTRAMUSCULAR; INTRAVENOUS
Status: COMPLETED
Start: 2019-12-08 | End: 2019-12-08

## 2019-12-08 RX ORDER — DIAZEPAM 5 MG/1
5 TABLET ORAL EVERY 8 HOURS PRN
Status: DISCONTINUED | OUTPATIENT
Start: 2019-12-08 | End: 2019-12-08

## 2019-12-08 RX ORDER — ONDANSETRON 2 MG/ML
4 INJECTION INTRAMUSCULAR; INTRAVENOUS PRN
Status: ACTIVE | OUTPATIENT
Start: 2019-12-08 | End: 2019-12-08

## 2019-12-08 RX ORDER — SODIUM CHLORIDE 9 MG/ML
500 INJECTION, SOLUTION INTRAVENOUS
Status: ACTIVE | OUTPATIENT
Start: 2019-12-08 | End: 2019-12-08

## 2019-12-08 RX ADMIN — VANCOMYCIN HYDROCHLORIDE 1200 MG: 500 INJECTION, POWDER, LYOPHILIZED, FOR SOLUTION INTRAVENOUS at 14:01

## 2019-12-08 RX ADMIN — BACLOFEN 10 MG: 10 TABLET ORAL at 22:12

## 2019-12-08 RX ADMIN — FENTANYL CITRATE 50 MCG: 50 INJECTION, SOLUTION INTRAMUSCULAR; INTRAVENOUS at 17:25

## 2019-12-08 RX ADMIN — AMPICILLIN SODIUM AND SULBACTAM SODIUM 3 G: 2; 1 INJECTION, POWDER, FOR SOLUTION INTRAMUSCULAR; INTRAVENOUS at 05:02

## 2019-12-08 RX ADMIN — METOPROLOL TARTRATE 25 MG: 25 TABLET, FILM COATED ORAL at 04:52

## 2019-12-08 RX ADMIN — MULTIPLE VITAMINS W/ MINERALS TAB 1 TABLET: TAB at 04:53

## 2019-12-08 RX ADMIN — FLECAINIDE ACETATE 25 MG: 100 TABLET ORAL at 18:23

## 2019-12-08 RX ADMIN — AMPICILLIN SODIUM AND SULBACTAM SODIUM 3 G: 2; 1 INJECTION, POWDER, FOR SOLUTION INTRAMUSCULAR; INTRAVENOUS at 18:23

## 2019-12-08 RX ADMIN — VANCOMYCIN HYDROCHLORIDE 1300 MG: 500 INJECTION, POWDER, LYOPHILIZED, FOR SOLUTION INTRAVENOUS at 01:31

## 2019-12-08 RX ADMIN — LOSARTAN POTASSIUM 25 MG: 25 TABLET ORAL at 04:52

## 2019-12-08 RX ADMIN — AMPICILLIN SODIUM AND SULBACTAM SODIUM 3 G: 2; 1 INJECTION, POWDER, FOR SOLUTION INTRAMUSCULAR; INTRAVENOUS at 13:21

## 2019-12-08 RX ADMIN — FERROUS SULFATE TAB 325 MG (65 MG ELEMENTAL FE) 325 MG: 325 (65 FE) TAB at 04:53

## 2019-12-08 RX ADMIN — METOPROLOL TARTRATE 25 MG: 25 TABLET, FILM COATED ORAL at 18:23

## 2019-12-08 RX ADMIN — BACLOFEN 10 MG: 10 TABLET ORAL at 13:28

## 2019-12-08 RX ADMIN — MIDAZOLAM HYDROCHLORIDE 2 MG: 1 INJECTION, SOLUTION INTRAMUSCULAR; INTRAVENOUS at 17:25

## 2019-12-08 RX ADMIN — FENTANYL CITRATE 50 MCG: 50 INJECTION, SOLUTION INTRAMUSCULAR; INTRAVENOUS at 17:29

## 2019-12-08 RX ADMIN — DOCUSATE SODIUM 100 MG: 100 CAPSULE, LIQUID FILLED ORAL at 04:54

## 2019-12-08 RX ADMIN — BACLOFEN 10 MG: 10 TABLET ORAL at 18:23

## 2019-12-08 RX ADMIN — TRAZODONE HYDROCHLORIDE 50 MG: 50 TABLET ORAL at 22:12

## 2019-12-08 RX ADMIN — FERROUS SULFATE TAB 325 MG (65 MG ELEMENTAL FE) 325 MG: 325 (65 FE) TAB at 18:24

## 2019-12-08 RX ADMIN — FLECAINIDE ACETATE 25 MG: 100 TABLET ORAL at 04:53

## 2019-12-08 RX ADMIN — FENTANYL CITRATE 50 MCG: 0.05 INJECTION, SOLUTION INTRAMUSCULAR; INTRAVENOUS at 17:25

## 2019-12-08 RX ADMIN — FINASTERIDE 5 MG: 5 TABLET, FILM COATED ORAL at 04:54

## 2019-12-08 RX ADMIN — MIDAZOLAM HYDROCHLORIDE 2 MG: 1 INJECTION INTRAMUSCULAR; INTRAVENOUS at 17:25

## 2019-12-08 ASSESSMENT — ENCOUNTER SYMPTOMS
SHORTNESS OF BREATH: 0
COUGH: 0
VOMITING: 0
ABDOMINAL PAIN: 0
HEADACHES: 0
DIZZINESS: 0
FEVER: 0
NAUSEA: 0
DIARRHEA: 0
CHILLS: 0

## 2019-12-08 NOTE — PROGRESS NOTES
2 RN skin check complete with Tiana RN  Devices in place Cazares catheter; colostomy.  Skin assessed under devices Yes; intact and blanching.  Confirmed pressure ulcers found on Sacral area.  New potential pressure ulcers noted on 3rd and 4th toe of let foot. Wound consult placed; Yes.    The following interventions in place; on low air loss mattress; assisted to turn to sides. Cleansed wound with normal saline; mepilex in placed

## 2019-12-08 NOTE — DISCHARGE PLANNING
Received Choice form at [phone]  Agency/Facility Name: [facility] ([facility])  Referral sent per Choice form @ [phone]

## 2019-12-08 NOTE — PROGRESS NOTES
"Pharmacy Kinetics 69 y.o. male on vancomycin day # 3    2019    Currently on Vancomycin 1300mg q12hr (100 1300)  Provider specified end date: TBD     Indication for Treatment: SSTI,Osteo     Pertinent history per medical record: Admitted on 2019 for Decubitus ulcer of coccyx. 69 y.o. male history of quadriplegia.  He has suffered from a decubitus ulcer as well as pelvic osteomyelitis.      Other antibiotics: Unasyn 3g iv q6hr      Allergies: Sulfa drugs      List concerns for renal function: quadriplegia     Pertinent cultures to date:   19:PBCx2:NGTD  19:Decubitus,WND:Rare Gram positive cocci.      MRSA nares swab if pneumonia is a concern (ordered/positive/negative/n-a): NA    Recent Labs     19  1213 19  0143 19  0041   WBC 8.4 5.7 5.9   NEUTSPOLYS 74.90* 63.90  --      Recent Labs     19  1213 19  0143 19  0041   BUN 9 8 11   CREATININE 0.58 0.63 0.59   ALBUMIN 3.6  --   --      Recent Labs     19  0041   VANCOTROUGH 17.0       Intake/Output Summary (Last 24 hours) at 2019 0913  Last data filed at 2019 0800  Gross per 24 hour   Intake 1090 ml   Output 950 ml   Net 140 ml      /75   Pulse 97   Temp 36.7 °C (98 °F) (Temporal)   Resp 17   Ht 1.778 m (5' 10\")   Wt 84.3 kg (185 lb 13.6 oz)   SpO2 97%  Temp (24hrs), Av.6 °C (97.9 °F), Min:36.3 °C (97.4 °F), Max:36.9 °C (98.5 °F)      A/P        1. Vancomycin dose change: Vancomycin 1200mg q12hr (0100 1300)  2. Next vancomycin level: 2-3 days  3. Goal trough: 12-16 mcg/mL  4. Comments: No leukocytosis , afebrile, cx listed above. Possible IR drainage today. SCr may not accurately est renal fx given quadriplegia, following levels closely. Level slightly above goal , maintenance dose reduced.      Alfa Laughlin PharmD BCPS   "

## 2019-12-08 NOTE — PROGRESS NOTES
Infectious Disease Progress Note    Author: Leny Rios M.D. Date & Time of service: 2019  8:52 AM    Chief Complaint:  Follow-up for sacral abscess and chronic osteomyelitis    Interval History:  69 y.o. man well-known to the ID service with a history of C5-7 paraplegia, and history of a stage IV sacral decubitus ulcer complicated by sacral osteomyelitis status post diverting colostomy in July and debridement down to bone in August by Dr. Ansari with pathology positive for acute osteomyelitis and skin flap admitted for sacral wound drainage concerning for infection.      afebrile WBC 5.9.  CT shows sacral abscess and osteomyelitis.  Results of CT discussed with patient.  Plan for IR drainage today with biopsy.      Labs Reviewed, Medications Reviewed and Radiology Reviewed.    Review of Systems:  Review of Systems   Constitutional: Negative for chills and fever.   Respiratory: Negative for cough and shortness of breath.    Gastrointestinal: Negative for abdominal pain, diarrhea, nausea and vomiting.   Neurological: Negative for dizziness and headaches.   Limited review of systems as patient is paraplegic    Hemodynamics:  Temp (24hrs), Av.6 °C (97.9 °F), Min:36.3 °C (97.4 °F), Max:36.9 °C (98.5 °F)  Temperature: 36.7 °C (98 °F)  Pulse  Av.5  Min: 67  Max: 97   Blood Pressure : 125/75       Physical Exam:  Physical Exam  Constitutional:       Appearance: Normal appearance. He is not ill-appearing.   HENT:      Head: Atraumatic.      Mouth/Throat:      Mouth: Mucous membranes are moist.      Pharynx: No oropharyngeal exudate.   Eyes:      Extraocular Movements: Extraocular movements intact.      Conjunctiva/sclera: Conjunctivae normal.   Neck:      Musculoskeletal: Normal range of motion and neck supple.   Cardiovascular:      Rate and Rhythm: Normal rate and regular rhythm.   Pulmonary:      Effort: Pulmonary effort is normal.      Breath sounds: Normal breath sounds.   Abdominal:       Palpations: Abdomen is soft.      Comments: Ostomy   Genitourinary:     Comments: Sacral wound draining serous fluid  Musculoskeletal:      Comments: Bilateral lower extremity atrophy   Skin:     General: Skin is warm and dry.   Neurological:      Mental Status: He is alert and oriented to person, place, and time.      Sensory: Sensory deficit present.      Coordination: Coordination abnormal.      Deep Tendon Reflexes: Reflexes abnormal.   Psychiatric:         Mood and Affect: Mood normal.         Behavior: Behavior normal.      Comments: Pleasant         Meds:    Current Facility-Administered Medications:   •  baclofen  •  docusate sodium  •  ferrous sulfate  •  finasteride  •  flecainide  •  losartan  •  metoprolol  •  rivaroxaban  •  sennosides  •  therapeutic multivitamin-minerals  •  traZODone  •  acetaminophen  •  enalaprilat  •  ampicillin-sulbactam (UNASYN) IV  •  MD Alert...Vancomycin per Pharmacy  •  vancomycin    Labs:  Recent Labs     12/06/19 1213 12/07/19 0143 12/08/19 0041   WBC 8.4 5.7 5.9   RBC 3.91* 3.55* 3.68*   HEMOGLOBIN 11.4* 10.6* 11.0*   HEMATOCRIT 37.2* 33.4* 34.0*   MCV 95.1 94.1 92.4   MCH 29.2 29.9 29.9   RDW 56.4* 54.9* 53.6*   PLATELETCT 296 270 241   MPV 8.6* 8.7* 8.5*   NEUTSPOLYS 74.90* 63.90  --    LYMPHOCYTES 13.00* 21.30*  --    MONOCYTES 9.40 10.50  --    EOSINOPHILS 1.70 3.50  --    BASOPHILS 0.50 0.50  --      Recent Labs     12/06/19 1213 12/07/19 0143 12/08/19 0041   SODIUM 141 144 142   POTASSIUM 4.6 3.9 3.9   CHLORIDE 108 112 111   CO2 23 26 25   GLUCOSE 92 90 96   BUN 9 8 11     Recent Labs     12/06/19 1213 12/07/19 0143 12/08/19 0041   ALBUMIN 3.6  --   --    TBILIRUBIN 0.5  --   --    ALKPHOSPHAT 64  --   --    TOTPROTEIN 6.8  --   --    ALTSGPT 9  --   --    ASTSGOT 19  --   --    CREATININE 0.58 0.63 0.59       Imaging:  Ct-pelvis With    Result Date: 12/7/2019 12/7/2019 3:12 PM HISTORY/REASON FOR EXAM:  Abscess, anal or rectal; PLEASE LOOK AT COCCYX as  well. TECHNIQUE/EXAM DESCRIPTION AND NUMBER OF VIEWS: CT scan of the pelvis with contrast. Contrast-enhanced helical scanning of the pelvis was obtained from the iliac crests through the pubic symphysis following the bolus administration of 90 mL of Omnipaque 350 nonionic contrast without complication. Low dose optimization technique was utilized for this CT exam including automated exposure control and adjustment of the mA and/or kV according to patient size. COMPARISON:  7/26/19 FINDINGS: Again there is marked abnormality at the sacrococcygeal junction. The coccyx is displaced 2 cm anteriorly, stable to slightly improved from comparison. The distal aspect of S5 and the proximal aspect of the coccyx are absent, either surgically resected or chronically resorbed. The previously identified decubitus ulcer that was largely gas-filled no longer has clear opening through the dermis but there is gas and fluid in the forward ulcer bed. This abnormal fluid and gas extends 13 cm transverse by 2 cm anterior-posterior by 5 cm craniocaudal and is in direct contact with the distal sacrum and the dorsal margin of the coccyx. Some gas tracks along the levator ani but does not extend above it. A small amount of soft tissue gas extends above this fluid and gas collection, is seen over the dorsal margin of S3 There is presacral edema/fat stranding but this is improved from comparison Mild new bony destruction is identified at the terminus of the sacrum where the length of the sacrum is slightly diminished and it is sclerotic which can be seen with chronic osteomyelitis There is a left lower quadrant colostomy with no bowel obstruction. Normal caliber appendix There is a left nonobstructive 8 mm nephrolith, measuring Hounsfield units Cazares catheter in a mostly decompressed bladder. The wall is diffusely thickened. There is some dependent hyperdensity which is compatible with urolith     1.  Interval development of a 13 x 5 x 2 cm gas  "and fluid collection overlying the dorsal sacrococcygeal junction region where there used to be a large open decubitus ulcer. This suggests abscess formation with presumed draining sinus 2.  Anteriorly displaced distal coccyx with new S4 partial sacral volume loss and sclerosis indicating osteomyelitis favored over interval partial sacrectomy 3.  For catheter placement diffuse bilateral thickening as before. This indicates chronic cystitis and/or outlet obstruction and there is some new depending calcification likely from urolith favored over bladder wall calcification 4.  Otherwise stable evaluation following left lower quadrant colostomy, no bowel obstruction. 8 mm nonobstructive left nephrolith. Severe atherosclerosis.      Micro:  Results     Procedure Component Value Units Date/Time    CULTURE WOUND W/ GRAM STAIN [[id number]] Collected:  12/06/19 1234    Order Status:  Completed Specimen:  Wound from Decubitus Updated:  12/07/19 1126     Significant Indicator NEG     Source WND     Site DECUBITUS     Culture Result Light growth mixed skin ade.     Gram Stain Result Many WBCs.  Rare Gram positive cocci.      BLOOD CULTURE [[id number]] Collected:  12/06/19 1225    Order Status:  Completed Specimen:  Blood from Peripheral Updated:  12/07/19 0841     Significant Indicator NEG     Source BLD     Site PERIPHERAL     Culture Result No Growth  Note: Blood cultures are incubated for 5 days and  are monitored continuously.Positive blood cultures  are called to the RN and reported as soon as  they are identified.      Narrative:       Per Hospital Policy: Only change Specimen Src: to \"Line\" if  specified by physician order.  Right AC    BLOOD CULTURE [[id number]] Collected:  12/06/19 1213    Order Status:  Completed Specimen:  Blood from Peripheral Updated:  12/07/19 0841     Significant Indicator NEG     Source BLD     Site PERIPHERAL     Culture Result No Growth  Note: Blood cultures are incubated for 5 days and  are " "monitored continuously.Positive blood cultures  are called to the RN and reported as soon as  they are identified.      Narrative:       Per Hospital Policy: Only change Specimen Src: to \"Line\" if  specified by physician order.  No site indicated    CULTURE WOUND W/ GRAM STAIN [[id number]]     Order Status:  No result Specimen:  Wound from Back     GRAM STAIN [[id number]] Collected:  12/06/19 3816    Order Status:  Completed Specimen:  Wound Updated:  12/06/19 7769     Significant Indicator .     Source WND     Site DECUBITUS     Gram Stain Result Many WBCs.  Rare Gram positive cocci.            Assessment:  Active Hospital Problems    Diagnosis   • *Osteomyelitis of coccyx ([facility]) [M86.9]   • Prolonged QT interval [R94.31]   • S/P colostomy ([facility]) [Z93.3]   • Anemia [D64.9]   • Paroxysmal atrial flutter ([facility]) [I48.92]   • Paraplegia ([facility]) [G82.20]   • Essential hypertension [I10]   • Neurogenic bladder [N31.9]       Plan:  Sacral abscess (noted on CT)  CT pelvis shows a 13x5x2 cm gas and fluid collection  No fevers  No leukocytosis  ESR 55  Needs IR drainage.  Please send fluid for bacterial, AFB and fungal cultures  Seen by plastic surgery-no surgical intervention  Continue Unasyn and vancomycin for now  Monitor renal function and Vanco trough    Chronic osteomyelitis of the sacrum  Status post debridement with bone biopsies by Dr. Moon on 8/21/2019.  Pathology consistent with acute osteomyelitis.  Cultures grew MRSE and he completed a 6-week course of vancomycin, ceftriaxone and Flagyl through 10/1/2019 at [facility].  Status post skin flap by Dr. Moon  CT pelvis-displaced distal coccyx with new S4 partial sacral volume loss and sclerosis indicating osteomyelitis  Recommend bone biopsy  Continue antibiotics per above    Neurogenic bladder  With chronic indwelling catheter    C5-7 paraplegia    Plan of care discussed with internal medicine/Dr. Shelton  "
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
## 2019-12-08 NOTE — DISCHARGE PLANNING
Anticipated Discharge Disposition: LTAC    Action: Spoke with pt at bedside and obtained choice for LTAC: [facility]. Faxed to JOYCE [facility] at [phone].     Barriers to Discharge: LTAC acceptance, medical clearance    Plan: follow up with LTAC
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
## 2019-12-09 ENCOUNTER — APPOINTMENT (OUTPATIENT)
Dept: CARDIOLOGY | Facility: MEDICAL CENTER | Age: 69
DRG: 477 | End: 2019-12-09
Attending: INTERNAL MEDICINE
Payer: MEDICARE

## 2019-12-09 PROBLEM — R00.1 BRADYCARDIA: Status: ACTIVE | Noted: 2019-12-09

## 2019-12-09 LAB
ANION GAP SERPL CALC-SCNC: 6 MMOL/L (ref 0–11.9)
BUN SERPL-MCNC: 8 MG/DL (ref 8–22)
CALCIUM SERPL-MCNC: 8.4 MG/DL (ref 8.5–10.5)
CHLORIDE SERPL-SCNC: 113 MMOL/L (ref 96–112)
CO2 SERPL-SCNC: 24 MMOL/L (ref 20–33)
CREAT SERPL-MCNC: 0.53 MG/DL (ref 0.5–1.4)
EKG IMPRESSION: NORMAL
ERYTHROCYTE [DISTWIDTH] IN BLOOD BY AUTOMATED COUNT: 53.6 FL (ref 35.9–50)
GLUCOSE SERPL-MCNC: 89 MG/DL (ref 65–99)
GRAM STN SPEC: NORMAL
HCT VFR BLD AUTO: 32.5 % (ref 42–52)
HGB BLD-MCNC: 10.4 G/DL (ref 14–18)
LV EJECT FRACT  99904: 60
LV EJECT FRACT MOD 2C 99903: 58.4
LV EJECT FRACT MOD 4C 99902: 66.91
LV EJECT FRACT MOD BP 99901: 61.62
MAGNESIUM SERPL-MCNC: 2.1 MG/DL (ref 1.5–2.5)
MCH RBC QN AUTO: 30.1 PG (ref 27–33)
MCHC RBC AUTO-ENTMCNC: 32 G/DL (ref 33.7–35.3)
MCV RBC AUTO: 93.9 FL (ref 81.4–97.8)
PATHOLOGY CONSULT NOTE: NORMAL
PLATELET # BLD AUTO: 218 K/UL (ref 164–446)
PMV BLD AUTO: 8.7 FL (ref 9–12.9)
POTASSIUM SERPL-SCNC: 3.6 MMOL/L (ref 3.6–5.5)
RBC # BLD AUTO: 3.46 M/UL (ref 4.7–6.1)
SIGNIFICANT IND 70042: NORMAL
SITE SITE: NORMAL
SODIUM SERPL-SCNC: 143 MMOL/L (ref 135–145)
SOURCE SOURCE: NORMAL
T4 FREE SERPL-MCNC: 1.27 NG/DL (ref 0.53–1.43)
TSH SERPL DL<=0.005 MIU/L-ACNC: 0.35 UIU/ML (ref 0.38–5.33)
WBC # BLD AUTO: 5.1 K/UL (ref 4.8–10.8)

## 2019-12-09 PROCEDURE — 99233 SBSQ HOSP IP/OBS HIGH 50: CPT | Performed by: INTERNAL MEDICINE

## 2019-12-09 PROCEDURE — 83735 ASSAY OF MAGNESIUM: CPT

## 2019-12-09 PROCEDURE — 93306 TTE W/DOPPLER COMPLETE: CPT | Mod: 26 | Performed by: INTERNAL MEDICINE

## 2019-12-09 PROCEDURE — 700111 HCHG RX REV CODE 636 W/ 250 OVERRIDE (IP): Performed by: HOSPITALIST

## 2019-12-09 PROCEDURE — 93010 ELECTROCARDIOGRAM REPORT: CPT | Performed by: INTERNAL MEDICINE

## 2019-12-09 PROCEDURE — A9270 NON-COVERED ITEM OR SERVICE: HCPCS | Performed by: INTERNAL MEDICINE

## 2019-12-09 PROCEDURE — 700102 HCHG RX REV CODE 250 W/ 637 OVERRIDE(OP): Performed by: HOSPITALIST

## 2019-12-09 PROCEDURE — 99222 1ST HOSP IP/OBS MODERATE 55: CPT | Performed by: INTERNAL MEDICINE

## 2019-12-09 PROCEDURE — 700111 HCHG RX REV CODE 636 W/ 250 OVERRIDE (IP): Performed by: INTERNAL MEDICINE

## 2019-12-09 PROCEDURE — 700112 HCHG RX REV CODE 229: Performed by: HOSPITALIST

## 2019-12-09 PROCEDURE — 84443 ASSAY THYROID STIM HORMONE: CPT

## 2019-12-09 PROCEDURE — A9270 NON-COVERED ITEM OR SERVICE: HCPCS | Performed by: HOSPITALIST

## 2019-12-09 PROCEDURE — 80048 BASIC METABOLIC PNL TOTAL CA: CPT

## 2019-12-09 PROCEDURE — 36415 COLL VENOUS BLD VENIPUNCTURE: CPT

## 2019-12-09 PROCEDURE — 700105 HCHG RX REV CODE 258: Performed by: HOSPITALIST

## 2019-12-09 PROCEDURE — 99223 1ST HOSP IP/OBS HIGH 75: CPT | Performed by: INTERNAL MEDICINE

## 2019-12-09 PROCEDURE — 700105 HCHG RX REV CODE 258: Performed by: INTERNAL MEDICINE

## 2019-12-09 PROCEDURE — 93306 TTE W/DOPPLER COMPLETE: CPT

## 2019-12-09 PROCEDURE — 85027 COMPLETE CBC AUTOMATED: CPT

## 2019-12-09 PROCEDURE — 93005 ELECTROCARDIOGRAM TRACING: CPT | Performed by: INTERNAL MEDICINE

## 2019-12-09 PROCEDURE — 770020 HCHG ROOM/CARE - TELE (206)

## 2019-12-09 PROCEDURE — 700102 HCHG RX REV CODE 250 W/ 637 OVERRIDE(OP): Performed by: INTERNAL MEDICINE

## 2019-12-09 PROCEDURE — 84439 ASSAY OF FREE THYROXINE: CPT

## 2019-12-09 RX ORDER — LACTOBACILLUS RHAMNOSUS GG 10B CELL
1 CAPSULE ORAL
Status: DISCONTINUED | OUTPATIENT
Start: 2019-12-09 | End: 2019-12-23 | Stop reason: HOSPADM

## 2019-12-09 RX ORDER — SODIUM CHLORIDE 9 MG/ML
INJECTION, SOLUTION INTRAVENOUS
Status: COMPLETED
Start: 2019-12-09 | End: 2019-12-10

## 2019-12-09 RX ADMIN — FINASTERIDE 5 MG: 5 TABLET, FILM COATED ORAL at 05:13

## 2019-12-09 RX ADMIN — DOCUSATE SODIUM 100 MG: 100 CAPSULE, LIQUID FILLED ORAL at 17:43

## 2019-12-09 RX ADMIN — VANCOMYCIN HYDROCHLORIDE 1200 MG: 500 INJECTION, POWDER, LYOPHILIZED, FOR SOLUTION INTRAVENOUS at 15:08

## 2019-12-09 RX ADMIN — AMPICILLIN SODIUM AND SULBACTAM SODIUM 3 G: 2; 1 INJECTION, POWDER, FOR SOLUTION INTRAMUSCULAR; INTRAVENOUS at 05:11

## 2019-12-09 RX ADMIN — AMPICILLIN SODIUM AND SULBACTAM SODIUM 3 G: 2; 1 INJECTION, POWDER, FOR SOLUTION INTRAMUSCULAR; INTRAVENOUS at 12:09

## 2019-12-09 RX ADMIN — MULTIPLE VITAMINS W/ MINERALS TAB 1 TABLET: TAB at 05:11

## 2019-12-09 RX ADMIN — FLECAINIDE ACETATE 25 MG: 100 TABLET ORAL at 17:44

## 2019-12-09 RX ADMIN — FLECAINIDE ACETATE 25 MG: 100 TABLET ORAL at 05:13

## 2019-12-09 RX ADMIN — FERROUS SULFATE TAB 325 MG (65 MG ELEMENTAL FE) 325 MG: 325 (65 FE) TAB at 17:43

## 2019-12-09 RX ADMIN — BACLOFEN 10 MG: 10 TABLET ORAL at 21:53

## 2019-12-09 RX ADMIN — TRAZODONE HYDROCHLORIDE 50 MG: 50 TABLET ORAL at 21:53

## 2019-12-09 RX ADMIN — DOCUSATE SODIUM 100 MG: 100 CAPSULE, LIQUID FILLED ORAL at 05:13

## 2019-12-09 RX ADMIN — AMPICILLIN SODIUM AND SULBACTAM SODIUM 3 G: 2; 1 INJECTION, POWDER, FOR SOLUTION INTRAMUSCULAR; INTRAVENOUS at 17:47

## 2019-12-09 RX ADMIN — Medication 1 CAPSULE: at 08:57

## 2019-12-09 RX ADMIN — VANCOMYCIN HYDROCHLORIDE 1200 MG: 500 INJECTION, POWDER, LYOPHILIZED, FOR SOLUTION INTRAVENOUS at 01:27

## 2019-12-09 RX ADMIN — FERROUS SULFATE TAB 325 MG (65 MG ELEMENTAL FE) 325 MG: 325 (65 FE) TAB at 05:13

## 2019-12-09 RX ADMIN — BACLOFEN 10 MG: 10 TABLET ORAL at 08:57

## 2019-12-09 RX ADMIN — METOPROLOL TARTRATE 25 MG: 25 TABLET, FILM COATED ORAL at 05:11

## 2019-12-09 RX ADMIN — LOSARTAN POTASSIUM 25 MG: 25 TABLET ORAL at 05:11

## 2019-12-09 RX ADMIN — AMPICILLIN SODIUM AND SULBACTAM SODIUM 3 G: 2; 1 INJECTION, POWDER, FOR SOLUTION INTRAMUSCULAR; INTRAVENOUS at 00:38

## 2019-12-09 RX ADMIN — BACLOFEN 10 MG: 10 TABLET ORAL at 12:09

## 2019-12-09 RX ADMIN — BACLOFEN 10 MG: 10 TABLET ORAL at 17:43

## 2019-12-09 ASSESSMENT — ENCOUNTER SYMPTOMS
HEADACHES: 0
DIZZINESS: 0
DIARRHEA: 0
FEVER: 0
SHORTNESS OF BREATH: 0
VOMITING: 0
NAUSEA: 0
ABDOMINAL PAIN: 0
CHILLS: 0
COUGH: 0
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
## 2019-12-09 NOTE — CARE PLAN
Problem: Communication  Goal: The ability to communicate needs accurately and effectively will improve  Outcome: PROGRESSING AS EXPECTED    Pt communicated with effectively.      Problem: Safety  Goal: Will remain free from injury  Outcome: PROGRESSING AS EXPECTED     Pt appropriately assessed for risk for injury and falls.
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
## 2019-12-09 NOTE — OR SURGEON
Immediate Post- Operative Note        PostOp Diagnosis: Sacral abscess w/ possible osteo.       Procedure(s): CT guided drain placement, sacral bone bx for micro.       Estimated Blood Loss: Less than 5 ml        Complications: None            12/8/2019     1745 PM     Alfa Zaragoza

## 2019-12-09 NOTE — PROGRESS NOTES
Pt received from [place] [id number]. Tele monitor in place. VSS. Pt oriented to room. Educated on use of the call light. Pt demonstrated use of the call light. Discussed POC. All questions answered.

## 2019-12-09 NOTE — PROGRESS NOTES
Patient back from sacral drainage and bone biopsy. No pain, vitals WNL, room air. Tolerating food. BRENDA with no output in the last 20min.

## 2019-12-09 NOTE — PROGRESS NOTES
2 RN skin check complete w/ Joaquin RN.   Devices in place glasses.  Colostomy in place, skin clean dry and intact.   Skin assessed under devices yes.  Wound found on posterior lower left leg.   Wound dressing in place w/ BRENDA drain placed yesterday afternoon by ID for pressure injury on coccyx.   Skin pink and blanching around dressing. Dressing clean dry and intact.  Third and fourth toes of left foot slight black skin surrounding, per pt, d/t toenails falling off.   Wound consult already placed, pictures uploaded.   Heel float boots in place.

## 2019-12-09 NOTE — PROGRESS NOTES
Assumed care of pt from night shift RN. Pt AOx4 and resting in bed. Pt states he feels fine and does not have any pain or any other needs at this time. CNA notified of HR  In the 40s~. Rn rechecked and HR was 44. MD paged about pt's HR.

## 2019-12-09 NOTE — PROGRESS NOTES
IR CT RN note:    Site  Confirmed with MD, patient and RN pre procedure   sacral fluid collection drain placement/aspiration and sacral bone biopsy with moderate sedation  by MD Zaragoza assisted by CT Tech Julio Cesar,Posterior sacrum access site;  Drain placed   BS Flexima APDL Locking pigtail drainage catheter 8 fr x 25 cm REF# [id number] LOT# [id number]  6 mL sample sent to lab      X 1 bone BX core sent to lab  End Tidal CO2 range 32-37 during procedure   Patient tolerated procedure, hemodynamically stable; pt awake and talking post procedure; report given to ZAIRA Somers;   patient transported back to room via IR RN monitored then transferred care to report RN

## 2019-12-09 NOTE — THERAPY
"Occupational Therapy Evaluation Attempted     OT eval attempted. Patient transferred from s6 to t8. Will continue to follow.    See \"Rehab Therapy-Acute\" Patient Summary Report for complete documentation.    "

## 2019-12-09 NOTE — PROGRESS NOTES
Pharmacy Kinetics 69 y.o. male on vancomycin day # 4    2019    Currently on Vancomycin 1200 mg iv q12hr ()  Provider specified end date: TBD    Indication for Treatment: Sacral osteomyelitis    Pertinent history per medical record: Transferred from assisted living facility on 2019 for worsening sacral wound. Patient with history of sacral decubitus ulcer d/t paraplegia. Patient s/p diverting colostomy in July to aid in wound healing as well as debridement of sacral osteomyelitis in August. Patient completed a six week course of vanco/ceftriaxone/flagyl in October and has previously been seen by ID. Patient reportedly has new wheelchair and cushion but noted the cushion deflated and a new wound developed. Empiric antibiotics initiated for treatment of the wound and patient underwent CT-guided drain placement with bone biopsy on . Plastic surgery and ID consulting.      Other antibiotics: ampicillin/sulbactam 3 g IV q6hrs    Allergies: Sulfa drugs     Concerns for renal function include age, SCr not true reflection of renal function d/t paraplegia & contrast for CT on .     Pertinent cultures to date:   : sacral abscess - Many WBCs. No organisms seen.  : decubitus wound cx - Moderate growth mixed skin ade.  : peripheral blood cx X2 - NGTD    MRSA nares swab if pneumonia is a concern (ordered/positive/negative/n-a): N/A    Pertinent imagin/7: CT pelvis - Interval development of a 13 x 5 x 2 cm gas and fluid collection overlying the dorsal sacrococcygeal junction region where there used to be a large open decubitus ulcer. This suggests abscess formation with presumed draining sinus. Anteriorly displaced distal coccyx with new S4 partial sacral volume loss and sclerosis indicating osteomyelitis favored over interval partial sacrectomy.    Recent Labs     19  1213 19  0143 19  0041 19  0222   WBC 8.4 5.7 5.9 5.1   NEUTSPOLYS 74.90* 63.90  --   --   "    Recent Labs     19  1213 19  0143 19  0041 19  0222   BUN 9 8 11 8   CREATININE 0.58 0.63 0.59 0.53   ALBUMIN 3.6  --   --   --      Recent Labs     19  0041   ALY 17.0       Intake/Output Summary (Last 24 hours) at 2019 0936  Last data filed at 2019 0600  Gross per 24 hour   Intake 610 ml   Output 2485 ml   Net -1875 ml      /96   Pulse (!) 45   Temp 37 °C (98.6 °F) (Temporal)   Resp 17   Ht 1.778 m (5' 10\")   Wt 84.3 kg (185 lb 13.6 oz)   SpO2 96%  Temp (24hrs), Av.7 °C (98 °F), Min:36.5 °C (97.7 °F), Max:37 °C (98.6 °F)      A/P   1. Vancomycin dose change: Continue current regimen  2. Next vancomycin level: 2 days (not currently ordered)  3. Goal trough: 12-16 mcg/mL  4. Comments: Patient remains on antibiotics for sacral wound, s/p drain and biopsy yesterday with cultures still in process. Patient's renal indices stable overnight, vanco dose reduced yesterday based on trough result. Will continue current vanco regimen and plan repeat  trough in 2 days to evaluate and adjust as necessary.     Pharmacy will continue to follow.     Kath Singh, PharmD        "

## 2019-12-09 NOTE — PROGRESS NOTES
Alert and oriented x4. Offered dinner tray and fluids. Pt consumed 75 to 100% of the meal. Safety precautions in placed. Bed in lowest position. Upper side rails up. Treaded socks on. Reinforced the use of call light when needing assistance.

## 2019-12-09 NOTE — PROGRESS NOTES
Infectious Disease Progress Note    Author: Leny Rios M.D. Date & Time of service: 2019  10:27 AM    Chief Complaint:  Follow-up for sacral abscess and chronic osteomyelitis    Interval History:  69 y.o. man well-known to the ID service with a history of C5-7 paraplegia, and history of a stage IV sacral decubitus ulcer complicated by sacral osteomyelitis status post diverting colostomy in July and debridement down to bone in August by Dr. Ansari with pathology positive for acute osteomyelitis and skin flap admitted for sacral wound drainage concerning for infection.      afebrile WBC 5.9.  CT shows sacral abscess and osteomyelitis.  Results of CT discussed with patient.  Plan for IR drainage today with biopsy.   afebrile WBC 5.1.  Patient transferred to telemetry this morning secondary to bradycardia.  He believes he got a double dose of his flecainide yesterday.  He denies any lightheadedness, dizziness or chest pain.  He underwent drain placement with sacral bone biopsy yesterday.  Cultures are pending.    Labs Reviewed, Medications Reviewed and Radiology Reviewed.    Review of Systems:  Review of Systems   Constitutional: Negative for chills and fever.   Respiratory: Negative for cough and shortness of breath.    Gastrointestinal: Negative for abdominal pain, diarrhea, nausea and vomiting.   Neurological: Negative for dizziness and headaches.   Limited review of systems as patient is paraplegic    Hemodynamics:  Temp (24hrs), Av.7 °C (98 °F), Min:36.5 °C (97.7 °F), Max:37 °C (98.6 °F)  Temperature: 37 °C (98.6 °F)  Pulse  Av.2  Min: 42  Max: 97   Blood Pressure : 158/96       Physical Exam:  Physical Exam  Constitutional:       Appearance: Normal appearance. He is not ill-appearing.   HENT:      Head: Atraumatic.      Mouth/Throat:      Mouth: Mucous membranes are moist.      Pharynx: No oropharyngeal exudate.   Eyes:      Extraocular Movements: Extraocular movements intact.       Conjunctiva/sclera: Conjunctivae normal.   Neck:      Musculoskeletal: Normal range of motion and neck supple.   Cardiovascular:      Rate and Rhythm: Normal rate and regular rhythm.   Pulmonary:      Effort: Pulmonary effort is normal.      Breath sounds: Normal breath sounds.   Abdominal:      Palpations: Abdomen is soft.      Comments: Ostomy   Genitourinary:     Comments: Sacral wound draining serous fluid    BRENDA drain  Musculoskeletal:      Comments: Bilateral lower extremity atrophy   Skin:     General: Skin is warm and dry.   Neurological:      Mental Status: He is alert and oriented to person, place, and time.      Sensory: Sensory deficit present.      Coordination: Coordination abnormal.      Deep Tendon Reflexes: Reflexes abnormal.   Psychiatric:         Mood and Affect: Mood normal.         Behavior: Behavior normal.      Comments: Pleasant         Meds:    Current Facility-Administered Medications:   •  lactobacillus rhamnosus  •  vancomycin  •  baclofen  •  docusate sodium  •  ferrous sulfate  •  finasteride  •  flecainide  •  losartan  •  sennosides  •  therapeutic multivitamin-minerals  •  traZODone  •  acetaminophen  •  enalaprilat  •  ampicillin-sulbactam (UNASYN) IV  •  MD Alert...Vancomycin per Pharmacy    Labs:  Recent Labs     12/06/19  1213 12/07/19 0143 12/08/19 0041 12/09/19 0222   WBC 8.4 5.7 5.9 5.1   RBC 3.91* 3.55* 3.68* 3.46*   HEMOGLOBIN 11.4* 10.6* 11.0* 10.4*   HEMATOCRIT 37.2* 33.4* 34.0* 32.5*   MCV 95.1 94.1 92.4 93.9   MCH 29.2 29.9 29.9 30.1   RDW 56.4* 54.9* 53.6* 53.6*   PLATELETCT 296 270 241 218   MPV 8.6* 8.7* 8.5* 8.7*   NEUTSPOLYS 74.90* 63.90  --   --    LYMPHOCYTES 13.00* 21.30*  --   --    MONOCYTES 9.40 10.50  --   --    EOSINOPHILS 1.70 3.50  --   --    BASOPHILS 0.50 0.50  --   --      Recent Labs     12/07/19  0143 12/08/19 0041 12/09/19 0222   SODIUM 144 142 143   POTASSIUM 3.9 3.9 3.6   CHLORIDE 112 111 113*   CO2 26 25 24   GLUCOSE 90 96 89   BUN 8 11 8      Recent Labs     12/06/19  1213 12/07/19  0143 12/08/19  0041 12/09/19  0222   ALBUMIN 3.6  --   --   --    TBILIRUBIN 0.5  --   --   --    ALKPHOSPHAT 64  --   --   --    TOTPROTEIN 6.8  --   --   --    ALTSGPT 9  --   --   --    ASTSGOT 19  --   --   --    CREATININE 0.58 0.63 0.59 0.53       Imaging:  Ct-pelvis With    Result Date: 12/7/2019 12/7/2019 3:12 PM HISTORY/REASON FOR EXAM:  Abscess, anal or rectal; PLEASE LOOK AT COCCYX as well. TECHNIQUE/EXAM DESCRIPTION AND NUMBER OF VIEWS: CT scan of the pelvis with contrast. Contrast-enhanced helical scanning of the pelvis was obtained from the iliac crests through the pubic symphysis following the bolus administration of 90 mL of Omnipaque 350 nonionic contrast without complication. Low dose optimization technique was utilized for this CT exam including automated exposure control and adjustment of the mA and/or kV according to patient size. COMPARISON:  7/26/19 FINDINGS: Again there is marked abnormality at the sacrococcygeal junction. The coccyx is displaced 2 cm anteriorly, stable to slightly improved from comparison. The distal aspect of S5 and the proximal aspect of the coccyx are absent, either surgically resected or chronically resorbed. The previously identified decubitus ulcer that was largely gas-filled no longer has clear opening through the dermis but there is gas and fluid in the forward ulcer bed. This abnormal fluid and gas extends 13 cm transverse by 2 cm anterior-posterior by 5 cm craniocaudal and is in direct contact with the distal sacrum and the dorsal margin of the coccyx. Some gas tracks along the levator ani but does not extend above it. A small amount of soft tissue gas extends above this fluid and gas collection, is seen over the dorsal margin of S3 There is presacral edema/fat stranding but this is improved from comparison Mild new bony destruction is identified at the terminus of the sacrum where the length of the sacrum is slightly  diminished and it is sclerotic which can be seen with chronic osteomyelitis There is a left lower quadrant colostomy with no bowel obstruction. Normal caliber appendix There is a left nonobstructive 8 mm nephrolith, measuring Hounsfield units Cazares catheter in a mostly decompressed bladder. The wall is diffusely thickened. There is some dependent hyperdensity which is compatible with urolith     1.  Interval development of a 13 x 5 x 2 cm gas and fluid collection overlying the dorsal sacrococcygeal junction region where there used to be a large open decubitus ulcer. This suggests abscess formation with presumed draining sinus 2.  Anteriorly displaced distal coccyx with new S4 partial sacral volume loss and sclerosis indicating osteomyelitis favored over interval partial sacrectomy 3.  For catheter placement diffuse bilateral thickening as before. This indicates chronic cystitis and/or outlet obstruction and there is some new depending calcification likely from urolith favored over bladder wall calcification 4.  Otherwise stable evaluation following left lower quadrant colostomy, no bowel obstruction. 8 mm nonobstructive left nephrolith. Severe atherosclerosis.      Micro:  Results     Procedure Component Value Units Date/Time    CULTURE TISSUE W/ GRM STAIN [[id number]] Collected:  12/08/19 1740    Order Status:  Completed Specimen:  Other Updated:  12/09/19 0926    GRAM STAIN [[id number]] Collected:  12/08/19 1735    Order Status:  Completed Specimen:  Body Fluid Updated:  12/08/19 2035     Significant Indicator .     Source BF     Site Sacral Abscess     Gram Stain Result Many WBCs.  No organisms seen.      Narrative:       Collected By:[id number] ORIANA PHAM  Sacral abscess  Collected By:[id number] ORIANA PHAM    FLUID CULTURE W/GRAM STAIN [[id number]] Collected:  12/08/19 1735    Order Status:  Completed Specimen:  Biopsy from Other Body Fluid Updated:  12/08/19 1803    Narrative:       Collected By:[id number] LLAMAS  "D  Sacral abscess    CULTURE WOUND W/ GRAM STAIN [[id number]] Collected:  12/06/19 1234    Order Status:  Completed Specimen:  Wound from Decubitus Updated:  12/08/19 0933     Significant Indicator NEG     Source WND     Site DECUBITUS     Culture Result Moderate growth mixed skin ade.     Gram Stain Result Many WBCs.  Rare Gram positive cocci.      BLOOD CULTURE [[id number]] Collected:  12/06/19 1225    Order Status:  Completed Specimen:  Blood from Peripheral Updated:  12/07/19 0841     Significant Indicator NEG     Source BLD     Site PERIPHERAL     Culture Result No Growth  Note: Blood cultures are incubated for 5 days and  are monitored continuously.Positive blood cultures  are called to the RN and reported as soon as  they are identified.      Narrative:       Per Hospital Policy: Only change Specimen Src: to \"Line\" if  specified by physician order.  Right AC    BLOOD CULTURE [[id number]] Collected:  12/06/19 1213    Order Status:  Completed Specimen:  Blood from Peripheral Updated:  12/07/19 0841     Significant Indicator NEG     Source BLD     Site PERIPHERAL     Culture Result No Growth  Note: Blood cultures are incubated for 5 days and  are monitored continuously.Positive blood cultures  are called to the RN and reported as soon as  they are identified.      Narrative:       Per Hospital Policy: Only change Specimen Src: to \"Line\" if  specified by physician order.  No site indicated    CULTURE WOUND W/ GRAM STAIN [[id number]]     Order Status:  No result Specimen:  Wound from Back     GRAM STAIN [[id number]] Collected:  12/06/19 1234    Order Status:  Completed Specimen:  Wound Updated:  12/06/19 1552     Significant Indicator .     Source WND     Site DECUBITUS     Gram Stain Result Many WBCs.  Rare Gram positive cocci.            Assessment:  Active Hospital Problems    Diagnosis   • *Osteomyelitis of coccyx ([facility]) [M86.9]   • Prolonged QT interval [R94.31]   • S/P colostomy ([facility]) [Z93.3]   • Anemia [D64.9] "   • Paroxysmal atrial flutter ([facility]) [I48.92]   • Paraplegia (HCC) [G82.20]   • Essential hypertension [I10]   • Neurogenic bladder [N31.9]       Plan:  Sacral abscess (noted on CT)  CT pelvis shows a 13x5x2 cm gas and fluid collection  No fevers  No leukocytosis  ESR 55  Status post IR drain and sacral bone biopsy on 12/8.  Follow cultures and pathology  Seen by plastic surgery-no surgical intervention  Continue Unasyn and vancomycin for now  Monitor renal function and Vanco trough  Last Vanco trough 17 on 12/8    Chronic osteomyelitis of the sacrum  Status post debridement with bone biopsies by Dr. Moon on 8/21/2019.  Pathology consistent with acute osteomyelitis.  Cultures grew MRSE and he completed a 6-week course of vancomycin, ceftriaxone and Flagyl through 10/1/2019 at [facility].  Status post skin flap by Dr. Moon  CT pelvis-displaced distal coccyx with new S4 partial sacral volume loss and sclerosis indicating osteomyelitis  Wound culture -mixed skin ade  Status post bone biopsy on 12/9  Pathology-pending  Continue antibiotics per above    Neurogenic bladder  With chronic indwelling catheter    C5-7 paraplegia    I have performed a physical exam and reviewed and updated ROS and plan today 12/9/2019.  In review of yesterday's note 12/8/2019, there are no changes except as documented above.        Plan of care discussed with internal medicine/Dr. Shelton

## 2019-12-10 LAB
ALBUMIN SERPL BCP-MCNC: 3.2 G/DL (ref 3.2–4.9)
BUN SERPL-MCNC: 10 MG/DL (ref 8–22)
CALCIUM SERPL-MCNC: 8.4 MG/DL (ref 8.5–10.5)
CHLORIDE SERPL-SCNC: 112 MMOL/L (ref 96–112)
CO2 SERPL-SCNC: 26 MMOL/L (ref 20–33)
CREAT SERPL-MCNC: 0.64 MG/DL (ref 0.5–1.4)
ERYTHROCYTE [DISTWIDTH] IN BLOOD BY AUTOMATED COUNT: 52 FL (ref 35.9–50)
GLUCOSE SERPL-MCNC: 84 MG/DL (ref 65–99)
HCT VFR BLD AUTO: 34.8 % (ref 42–52)
HGB BLD-MCNC: 10.9 G/DL (ref 14–18)
MCH RBC QN AUTO: 29.1 PG (ref 27–33)
MCHC RBC AUTO-ENTMCNC: 31.3 G/DL (ref 33.7–35.3)
MCV RBC AUTO: 92.8 FL (ref 81.4–97.8)
PHOSPHATE SERPL-MCNC: 3.5 MG/DL (ref 2.5–4.5)
PLATELET # BLD AUTO: 229 K/UL (ref 164–446)
PMV BLD AUTO: 8.3 FL (ref 9–12.9)
POTASSIUM SERPL-SCNC: 3.8 MMOL/L (ref 3.6–5.5)
RBC # BLD AUTO: 3.75 M/UL (ref 4.7–6.1)
SODIUM SERPL-SCNC: 143 MMOL/L (ref 135–145)
WBC # BLD AUTO: 6.2 K/UL (ref 4.8–10.8)

## 2019-12-10 PROCEDURE — A9270 NON-COVERED ITEM OR SERVICE: HCPCS | Performed by: HOSPITALIST

## 2019-12-10 PROCEDURE — 36415 COLL VENOUS BLD VENIPUNCTURE: CPT

## 2019-12-10 PROCEDURE — 700112 HCHG RX REV CODE 229: Performed by: HOSPITALIST

## 2019-12-10 PROCEDURE — 99231 SBSQ HOSP IP/OBS SF/LOW 25: CPT | Performed by: INTERNAL MEDICINE

## 2019-12-10 PROCEDURE — 99232 SBSQ HOSP IP/OBS MODERATE 35: CPT | Performed by: HOSPITALIST

## 2019-12-10 PROCEDURE — A9270 NON-COVERED ITEM OR SERVICE: HCPCS | Performed by: INTERNAL MEDICINE

## 2019-12-10 PROCEDURE — 700105 HCHG RX REV CODE 258: Performed by: HOSPITALIST

## 2019-12-10 PROCEDURE — 700111 HCHG RX REV CODE 636 W/ 250 OVERRIDE (IP): Performed by: HOSPITALIST

## 2019-12-10 PROCEDURE — 97535 SELF CARE MNGMENT TRAINING: CPT

## 2019-12-10 PROCEDURE — 80069 RENAL FUNCTION PANEL: CPT

## 2019-12-10 PROCEDURE — 770020 HCHG ROOM/CARE - TELE (206)

## 2019-12-10 PROCEDURE — 700102 HCHG RX REV CODE 250 W/ 637 OVERRIDE(OP): Performed by: HOSPITALIST

## 2019-12-10 PROCEDURE — 700111 HCHG RX REV CODE 636 W/ 250 OVERRIDE (IP): Performed by: INTERNAL MEDICINE

## 2019-12-10 PROCEDURE — 85027 COMPLETE CBC AUTOMATED: CPT

## 2019-12-10 PROCEDURE — 700105 HCHG RX REV CODE 258: Performed by: INTERNAL MEDICINE

## 2019-12-10 PROCEDURE — 99232 SBSQ HOSP IP/OBS MODERATE 35: CPT | Performed by: NURSE PRACTITIONER

## 2019-12-10 PROCEDURE — 700102 HCHG RX REV CODE 250 W/ 637 OVERRIDE(OP): Performed by: INTERNAL MEDICINE

## 2019-12-10 RX ADMIN — FLECAINIDE ACETATE 25 MG: 100 TABLET ORAL at 05:13

## 2019-12-10 RX ADMIN — DOCUSATE SODIUM 100 MG: 100 CAPSULE, LIQUID FILLED ORAL at 17:00

## 2019-12-10 RX ADMIN — SENNOSIDES 17.2 MG: 8.6 TABLET, FILM COATED ORAL at 05:13

## 2019-12-10 RX ADMIN — AMPICILLIN SODIUM AND SULBACTAM SODIUM 3 G: 2; 1 INJECTION, POWDER, FOR SOLUTION INTRAMUSCULAR; INTRAVENOUS at 17:00

## 2019-12-10 RX ADMIN — BACLOFEN 10 MG: 10 TABLET ORAL at 21:59

## 2019-12-10 RX ADMIN — VANCOMYCIN HYDROCHLORIDE 1200 MG: 500 INJECTION, POWDER, LYOPHILIZED, FOR SOLUTION INTRAVENOUS at 13:21

## 2019-12-10 RX ADMIN — LOSARTAN POTASSIUM 25 MG: 25 TABLET ORAL at 05:14

## 2019-12-10 RX ADMIN — FLECAINIDE ACETATE 25 MG: 100 TABLET ORAL at 17:00

## 2019-12-10 RX ADMIN — BACLOFEN 10 MG: 10 TABLET ORAL at 12:14

## 2019-12-10 RX ADMIN — TRAZODONE HYDROCHLORIDE 50 MG: 50 TABLET ORAL at 21:59

## 2019-12-10 RX ADMIN — AMPICILLIN SODIUM AND SULBACTAM SODIUM 3 G: 2; 1 INJECTION, POWDER, FOR SOLUTION INTRAMUSCULAR; INTRAVENOUS at 12:14

## 2019-12-10 RX ADMIN — AMPICILLIN SODIUM AND SULBACTAM SODIUM 3 G: 2; 1 INJECTION, POWDER, FOR SOLUTION INTRAMUSCULAR; INTRAVENOUS at 00:02

## 2019-12-10 RX ADMIN — VANCOMYCIN HYDROCHLORIDE 1200 MG: 500 INJECTION, POWDER, LYOPHILIZED, FOR SOLUTION INTRAVENOUS at 00:39

## 2019-12-10 RX ADMIN — FINASTERIDE 5 MG: 5 TABLET, FILM COATED ORAL at 05:13

## 2019-12-10 RX ADMIN — AMPICILLIN SODIUM AND SULBACTAM SODIUM 3 G: 2; 1 INJECTION, POWDER, FOR SOLUTION INTRAMUSCULAR; INTRAVENOUS at 05:14

## 2019-12-10 RX ADMIN — FERROUS SULFATE TAB 325 MG (65 MG ELEMENTAL FE) 325 MG: 325 (65 FE) TAB at 05:13

## 2019-12-10 RX ADMIN — MULTIPLE VITAMINS W/ MINERALS TAB 1 TABLET: TAB at 05:13

## 2019-12-10 RX ADMIN — BACLOFEN 10 MG: 10 TABLET ORAL at 17:00

## 2019-12-10 RX ADMIN — BACLOFEN 10 MG: 10 TABLET ORAL at 07:51

## 2019-12-10 RX ADMIN — Medication 1 CAPSULE: at 07:51

## 2019-12-10 RX ADMIN — DOCUSATE SODIUM 100 MG: 100 CAPSULE, LIQUID FILLED ORAL at 05:13

## 2019-12-10 RX ADMIN — FERROUS SULFATE TAB 325 MG (65 MG ELEMENTAL FE) 325 MG: 325 (65 FE) TAB at 17:00

## 2019-12-10 RX ADMIN — RIVAROXABAN 20 MG: 20 TABLET, FILM COATED ORAL at 18:40

## 2019-12-10 ASSESSMENT — ENCOUNTER SYMPTOMS
EYES NEGATIVE: 1
GASTROINTESTINAL NEGATIVE: 1
SHORTNESS OF BREATH: 0
WEAKNESS: 0
CONSTITUTIONAL NEGATIVE: 1
HEADACHES: 0
ABDOMINAL PAIN: 0
FATIGUE: 0
FEVER: 0
DIZZINESS: 0
COLOR CHANGE: 0
DIARRHEA: 0
BRUISES/BLEEDS EASILY: 0
CARDIOVASCULAR NEGATIVE: 1
CONSTIPATION: 0
NAUSEA: 0
PSYCHIATRIC NEGATIVE: 1
VOMITING: 0
NERVOUS/ANXIOUS: 0
STRIDOR: 0
DIAPHORESIS: 0
CHILLS: 0
PALPITATIONS: 0
MUSCULOSKELETAL NEGATIVE: 1
COUGH: 0
MYALGIAS: 0
WHEEZING: 0
CHEST TIGHTNESS: 0
RESPIRATORY NEGATIVE: 1
LIGHT-HEADEDNESS: 0
NEUROLOGICAL NEGATIVE: 1
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
## 2019-12-10 NOTE — PROGRESS NOTES
Cardiology Follow Up Progress Note    Date of Service  12/10/2019    Attending Physician  Ramon Parnell M.D.    Chief Complaint   Atrial flutter.    HPI  Osvaldo Pate is a 69 y.o. male admitted 12/6/2019 with above.    Interim Events  No significant changes noted from cardiac standpoint within the past 24 hours.    Review of Systems  Review of Systems   Constitutional: Negative.  Negative for chills and fever.   HENT: Negative.  Negative for hearing loss.    Eyes: Negative.    Respiratory: Negative.  Negative for cough and shortness of breath.    Cardiovascular: Negative.  Negative for chest pain, palpitations and leg swelling.   Gastrointestinal: Negative.  Negative for abdominal pain, nausea and vomiting.   Genitourinary: Negative.  Negative for dysuria and urgency.   Musculoskeletal: Negative.  Negative for myalgias.   Skin: Negative.  Negative for rash.   Neurological: Negative.  Negative for dizziness, weakness and headaches.   Hematological: Does not bruise/bleed easily.   Psychiatric/Behavioral: Negative.  The patient is not nervous/anxious.        Vital signs in last 24 hours  Temp:  [36.3 °C (97.4 °F)-37.1 °C (98.7 °F)] 36.3 °C (97.4 °F)  Pulse:  [45-78] 70  Resp:  [15-17] 16  BP: (128-164)/(74-96) 149/89  SpO2:  [95 %-98 %] 98 %    Physical Exam  Physical Exam  Constitutional:       Appearance: He is well-developed.   HENT:      Head: Normocephalic and atraumatic.   Eyes:      Conjunctiva/sclera: Conjunctivae normal.      Pupils: Pupils are equal, round, and reactive to light.   Neck:      Musculoskeletal: Normal range of motion and neck supple.   Cardiovascular:      Rate and Rhythm: Normal rate and regular rhythm.   Pulmonary:      Effort: Pulmonary effort is normal.      Breath sounds: Normal breath sounds.   Abdominal:      General: Bowel sounds are normal.      Palpations: Abdomen is soft.   Musculoskeletal: Normal range of motion.   Skin:     General: Skin is warm and dry.   Neurological:       Mental Status: He is alert and oriented to person, place, and time.         Lab Review  Lab Results   Component Value Date/Time    WBC 6.2 12/10/2019 02:19 AM    RBC 3.75 (L) 12/10/2019 02:19 AM    HEMOGLOBIN 10.9 (L) 12/10/2019 02:19 AM    HEMATOCRIT 34.8 (L) 12/10/2019 02:19 AM    MCV 92.8 12/10/2019 02:19 AM    MCH 29.1 12/10/2019 02:19 AM    MCHC 31.3 (L) 12/10/2019 02:19 AM    MPV 8.3 (L) 12/10/2019 02:19 AM      Lab Results   Component Value Date/Time    SODIUM 143 12/10/2019 02:19 AM    POTASSIUM 3.8 12/10/2019 02:19 AM    CHLORIDE 112 12/10/2019 02:19 AM    CO2 26 12/10/2019 02:19 AM    GLUCOSE 84 12/10/2019 02:19 AM    BUN 10 12/10/2019 02:19 AM    CREATININE 0.64 12/10/2019 02:19 AM      Lab Results   Component Value Date/Time    ASTSGOT 19 12/06/2019 12:13 PM    ALTSGPT 9 12/06/2019 12:13 PM     No results found for: CHOLSTRLTOT, LDL, HDL, TRIGLYCERIDE, TROPONINT    No results for input(s): NTPROBNP in the last 72 hours.  (Above labs reviewed.)       Current Facility-Administered Medications:   •  lactobacillus rhamnosus (CULTURELLE) capsule 1 Cap, 1 Cap, Oral, QDAY with Breakfast, Jesus Jones M.D., 1 Cap at 12/10/19 0751  •  vancomycin (VANCOCIN) 1,200 mg in  mL IVPB, 1,200 mg, Intravenous, Q12HR, Jesus Jones M.D., Stopped at 12/10/19 0239  •  baclofen (LIORESAL) tablet 10 mg, 10 mg, Oral, 4X/DAY, IMELDA VenegasO., 10 mg at 12/10/19 0751  •  docusate sodium (COLACE) capsule 100 mg, 100 mg, Oral, BID, IMELDA VenegasO., 100 mg at 12/10/19 0513  •  ferrous sulfate tablet 325 mg, 325 mg, Oral, BID, ANKIT Venegas.O., 325 mg at 12/10/19 0513  •  finasteride (PROSCAR) tablet 5 mg, 5 mg, Oral, DAILY, IMELDA VenegasO., 5 mg at 12/10/19 0513  •  flecainide (TAMBOCOR) tablet 25 mg, 25 mg, Oral, BID, IMELDA VenegasODeniz, 25 mg at 12/10/19 0513  •  losartan (COZAAR) tablet 25 mg, 25 mg, Oral, DAILY, IMELDA VenegasO., 25 mg at 12/10/19 0514  •  sennosides (SENOKOT) 8.6 MG tablet  17.2 mg, 17.2 mg, Oral, DAILY, Lex Boudreaux D.O., 17.2 mg at 12/10/19 0513  •  therapeutic multivitamin-minerals (THERAGRAN-M) tablet 1 Tab, 1 Tab, Oral, DAILY, Lex Boudreaux D.O., 1 Tab at 12/10/19 0513  •  traZODone (DESYREL) tablet 50 mg, 50 mg, Oral, Nightly, IMELDA VenegasODeniz, 50 mg at 12/09/19 2153  •  acetaminophen (TYLENOL) tablet 650 mg, 650 mg, Oral, Q6HRS PRN, Lex Boudreaux D.O.  •  enalaprilat (VASOTEC) injection 1.25 mg, 1.25 mg, Intravenous, Q6HRS PRN, Lex Boudreaux D.O.  •  ampicillin/sulbactam (UNASYN) 3 g in  mL IVPB, 3 g, Intravenous, Q6HRS, Lex Boudreaux D.O., Stopped at 12/10/19 0544  •  MD Alert...Vancomycin per Pharmacy, , Other, PHARMACY TO DOSE, Lex Boudreaux D.O.  (Medications reviewed.)    Cardiac Imaging and Procedures Review  CARDIAC STUDIES AND PROCEDURES:    ECHOCARDIOGRAM CONCLUSIONS (12/09/19)  No prior study is available for comparison.   Normal left ventricular systolic function.  Left ventricular ejection fraction is visually estimated to be 60%.  Diastolic function is difficult to assess with atrial fibrillation.  Normal right ventricular systolic function.  Mild aortic insufficiency.  Mild tricuspid regurgitation.  Right ventricular systolic pressure is estimated to be 45 mmHg.  Ascending aorta diameter is 4.2  cm.  (study result reviewed)    EKG performed on (12/09/19) EKG shows atrial flutter with right bundle-branch block.    Assessment/Plan  1. Atrial flutter off chronic anticoagulation (Xarelto): He is clinically doing well. He is off of anticoagulation for surgeries or procedures of his decubitus ulcer. He will be scheduled for ablation prior to discharge. He will be followed by electrophysiology service.    Thank you for allowing me to participate in the care of this patient.  Cardiology will sign off on this patient    Please contact me with any questions.    Jose Avendaon M.D.   Cardiologist, [facility]  [phone]  [phone]

## 2019-12-10 NOTE — DISCHARGE PLANNING
Care Transition Team Assessment    LSW met w/ pt at bedside and verified facesheet demographics. Pt currently lives at [facility].  Pt is paraplegic and uses a power wc.  Pt reports that he gets help in/out of bed and help w/ dressing and bathing.  [facility], on [facility] and stated being there for about 11 weeks.      Pt says that he has no PCP but gets seen by [facility] which comes to him at the [facility].  Pt's pharmacy is [facility] on [address] in [place].    [facility] called about pt's current LTAC referral saying that he is likely out of Medicare days. She said she had to look further into it and would call back.      Information Source  Orientation : Oriented x 4  Information Given By: Patient  Informant's Name: Osvaldo  Who is responsible for making decisions for patient? : Patient         Elopement Risk  Legal Hold: No  Ambulatory or Self Mobile in Wheelchair: No-Not an Elopement Risk  Elopement Risk: Not at Risk for Elopement    Interdisciplinary Discharge Planning  Patient or legal guardian wants to designate a caregiver (see row info): No    Discharge Preparedness  What is your plan after discharge?: Uncertain - pending medical team collaboration  What are your discharge supports?: Child  Prior Functional Level: Needs Assist with Activities of Daily Living, Uses Wheelchair  Difficulity with ADLs: Bathing, Dressing, Walking  Difficulity with IADLs: Cooking, Driving, Laundry, Shopping    Functional Assesment  Prior Functional Level: Needs Assist with Activities of Daily Living, Uses Wheelchair    Finances  Financial Barriers to Discharge: No  Prescription Coverage: Yes    Vision / Hearing Impairment  Right Eye Vision: Impaired, Wears Glasses  Left Eye Vision: Impaired, Wears Glasses              Domestic Abuse  Have you ever been the victim of abuse or violence?: No  Physical Abuse or Sexual Abuse: No  Verbal Abuse or Emotional  Abuse: No  Possible Abuse Reported to:: Not Applicable    Psychological Assessment  History of Substance Abuse: None  History of Psychiatric Problems: No  Non-compliant with Treatment: No  Newly Diagnosed Illness: No    Discharge Risks or Barriers  Patient risk factors: Complex medical needs, No PCP    Anticipated Discharge Information  Anticipated discharge disposition: Discharge needs currently unknown

## 2019-12-10 NOTE — THERAPY
"Physical Therapy Contact Note    PT consult received and acknowledged. Patient reported he lives at [facility] and uses slideboard to transfer (with min A, patient reported he completes transfer \"mostly on his own\") to power WC which he uses for primary mobility. Patient reported his injury is at \"C7-T4\". He declined attempt at transfer today given sacral wound but agreeable to participating with therapy while in house and requested \"something to help keep my arms strong\". Plan to deliver theraband and UE exercises today and will reattempt eval as able and appropriate.    Mae Chin, PT, DPT  [phone]      "

## 2019-12-10 NOTE — PROGRESS NOTES
"Pharmacy Kinetics 69 y.o. male on vancomycin day # 5 12/10/2019    Currently on vancomycin 1200 mg IV q12h  Provider specified end date: TBD    Indication for Treatment: Sacral osteomyelitis     Pertinent history per medical record: Transferred from assisted living facility on 12/6/2019 for worsening sacral wound. Patient with history of sacral decubitus ulcer d/t paraplegia. Patient s/p diverting colostomy in July to aid in wound healing as well as debridement of sacral osteomyelitis in August. Patient completed a six week course of vanco/ceftriaxone/flagyl in October and has previously been seen by ID. Patient reportedly has new wheelchair and cushion but noted the cushion deflated and a new wound developed. Empiric antibiotics initiated for treatment of the wound and patient underwent CT-guided drain placement with bone biopsy on 12/8. Plastic surgery and ID consulting.       Other antibiotics: ampicillin/sulbactam 3 g IV q6h     Allergies: Sulfa drugs      Concerns for renal function include age, SCr not true reflection of renal function d/t paraplegia & contrast for CT on 12/7     Pertinent cultures to date:   12/8: sacral bone biopsy - cx in process, no orgs on stain  12/8: sacral abscess - cx in process, many WBCs/no organisms seen on stain  12/6: decubitus wound cx - Moderate growth mixed skin ade  12/6: peripheral blood cx X2 - NGTD    Recent Labs     12/08/19  0041 12/09/19  0222 12/10/19  0219   WBC 5.9 5.1 6.2     Recent Labs     12/08/19  0041 12/09/19  0222 12/10/19  0219   BUN 11 8 10   CREATININE 0.59 0.53 0.64   ALBUMIN  --   --  3.2     Recent Labs     12/08/19  0041   VANCOTROUGH 17.0       Intake/Output Summary (Last 24 hours) at 12/10/2019 0815  Last data filed at 12/10/2019 0514  Gross per 24 hour   Intake 200 ml   Output 2920 ml   Net -2720 ml      /89   Pulse 70   Temp 36.3 °C (97.4 °F) (Temporal)   Resp 16   Ht 1.778 m (5' 10\")   Wt 85.2 kg (187 lb 13.3 oz)   SpO2 98%  Temp " (24hrs), Av.7 °C (98.1 °F), Min:36.3 °C (97.4 °F), Max:37.1 °C (98.7 °F)      A/P   1. Vancomycin dose change: Continue 1200 mg IV q12h (@ 0100 & 1300)  2. Next vancomycin level: Tomorrow at 0030  3. Goal trough: 12-16 mcg/mL  4. Comments: Pt has remained afebrile and without leukocytosis. Still awaiting cx from IR to help guide therapy. Will check a trough tomorrow as pt should be at steady state    Alejandra Fonseca, PharmD, BCPS

## 2019-12-11 ENCOUNTER — APPOINTMENT (OUTPATIENT)
Dept: RADIOLOGY | Facility: MEDICAL CENTER | Age: 69
DRG: 477 | End: 2019-12-11
Attending: HOSPITALIST
Payer: MEDICARE

## 2019-12-11 LAB
BACTERIA BLD CULT: NORMAL
BACTERIA BLD CULT: NORMAL
BACTERIA FLD AEROBE CULT: NORMAL
GRAM STN SPEC: NORMAL
SIGNIFICANT IND 70042: NORMAL
SITE SITE: NORMAL
SOURCE SOURCE: NORMAL
VANCOMYCIN TROUGH SERPL-MCNC: 15.4 UG/ML (ref 10–20)

## 2019-12-11 PROCEDURE — 72193 CT PELVIS W/DYE: CPT

## 2019-12-11 PROCEDURE — 99232 SBSQ HOSP IP/OBS MODERATE 35: CPT | Performed by: INTERNAL MEDICINE

## 2019-12-11 PROCEDURE — A9270 NON-COVERED ITEM OR SERVICE: HCPCS | Performed by: HOSPITALIST

## 2019-12-11 PROCEDURE — 700105 HCHG RX REV CODE 258: Performed by: INTERNAL MEDICINE

## 2019-12-11 PROCEDURE — 770020 HCHG ROOM/CARE - TELE (206)

## 2019-12-11 PROCEDURE — 700102 HCHG RX REV CODE 250 W/ 637 OVERRIDE(OP): Performed by: HOSPITALIST

## 2019-12-11 PROCEDURE — A9270 NON-COVERED ITEM OR SERVICE: HCPCS | Performed by: INTERNAL MEDICINE

## 2019-12-11 PROCEDURE — 700111 HCHG RX REV CODE 636 W/ 250 OVERRIDE (IP): Performed by: HOSPITALIST

## 2019-12-11 PROCEDURE — 700102 HCHG RX REV CODE 250 W/ 637 OVERRIDE(OP): Performed by: INTERNAL MEDICINE

## 2019-12-11 PROCEDURE — 80202 ASSAY OF VANCOMYCIN: CPT

## 2019-12-11 PROCEDURE — 99233 SBSQ HOSP IP/OBS HIGH 50: CPT | Performed by: HOSPITALIST

## 2019-12-11 PROCEDURE — 700117 HCHG RX CONTRAST REV CODE 255: Performed by: HOSPITALIST

## 2019-12-11 PROCEDURE — 700111 HCHG RX REV CODE 636 W/ 250 OVERRIDE (IP): Performed by: INTERNAL MEDICINE

## 2019-12-11 PROCEDURE — 700112 HCHG RX REV CODE 229: Performed by: HOSPITALIST

## 2019-12-11 PROCEDURE — 700105 HCHG RX REV CODE 258: Performed by: HOSPITALIST

## 2019-12-11 PROCEDURE — 302098 PASTE RING (FLAT): Performed by: HOSPITALIST

## 2019-12-11 PROCEDURE — 36415 COLL VENOUS BLD VENIPUNCTURE: CPT

## 2019-12-11 RX ORDER — POLYETHYLENE GLYCOL 3350 17 G/17G
1 POWDER, FOR SOLUTION ORAL DAILY
Status: DISCONTINUED | OUTPATIENT
Start: 2019-12-11 | End: 2019-12-14

## 2019-12-11 RX ORDER — LOSARTAN POTASSIUM 50 MG/1
50 TABLET ORAL DAILY
Status: DISCONTINUED | OUTPATIENT
Start: 2019-12-12 | End: 2019-12-19

## 2019-12-11 RX ADMIN — BACLOFEN 10 MG: 10 TABLET ORAL at 13:07

## 2019-12-11 RX ADMIN — Medication 1 CAPSULE: at 05:12

## 2019-12-11 RX ADMIN — VANCOMYCIN HYDROCHLORIDE 1200 MG: 500 INJECTION, POWDER, LYOPHILIZED, FOR SOLUTION INTRAVENOUS at 13:07

## 2019-12-11 RX ADMIN — DOCUSATE SODIUM 100 MG: 100 CAPSULE, LIQUID FILLED ORAL at 16:18

## 2019-12-11 RX ADMIN — FINASTERIDE 5 MG: 5 TABLET, FILM COATED ORAL at 05:12

## 2019-12-11 RX ADMIN — TRAZODONE HYDROCHLORIDE 50 MG: 50 TABLET ORAL at 21:16

## 2019-12-11 RX ADMIN — IOHEXOL 100 ML: 350 INJECTION, SOLUTION INTRAVENOUS at 18:00

## 2019-12-11 RX ADMIN — AMPICILLIN SODIUM AND SULBACTAM SODIUM 3 G: 2; 1 INJECTION, POWDER, FOR SOLUTION INTRAMUSCULAR; INTRAVENOUS at 16:18

## 2019-12-11 RX ADMIN — MULTIPLE VITAMINS W/ MINERALS TAB 1 TABLET: TAB at 05:12

## 2019-12-11 RX ADMIN — BACLOFEN 10 MG: 10 TABLET ORAL at 08:22

## 2019-12-11 RX ADMIN — FERROUS SULFATE TAB 325 MG (65 MG ELEMENTAL FE) 325 MG: 325 (65 FE) TAB at 16:18

## 2019-12-11 RX ADMIN — AMPICILLIN SODIUM AND SULBACTAM SODIUM 3 G: 2; 1 INJECTION, POWDER, FOR SOLUTION INTRAMUSCULAR; INTRAVENOUS at 23:28

## 2019-12-11 RX ADMIN — SENNOSIDES 17.2 MG: 8.6 TABLET, FILM COATED ORAL at 05:11

## 2019-12-11 RX ADMIN — POLYETHYLENE GLYCOL 3350 1 PACKET: 17 POWDER, FOR SOLUTION ORAL at 11:29

## 2019-12-11 RX ADMIN — AMPICILLIN SODIUM AND SULBACTAM SODIUM 3 G: 2; 1 INJECTION, POWDER, FOR SOLUTION INTRAMUSCULAR; INTRAVENOUS at 05:10

## 2019-12-11 RX ADMIN — RIVAROXABAN 20 MG: 20 TABLET, FILM COATED ORAL at 16:18

## 2019-12-11 RX ADMIN — FERROUS SULFATE TAB 325 MG (65 MG ELEMENTAL FE) 325 MG: 325 (65 FE) TAB at 05:12

## 2019-12-11 RX ADMIN — AMPICILLIN SODIUM AND SULBACTAM SODIUM 3 G: 2; 1 INJECTION, POWDER, FOR SOLUTION INTRAMUSCULAR; INTRAVENOUS at 11:29

## 2019-12-11 RX ADMIN — LOSARTAN POTASSIUM 25 MG: 25 TABLET ORAL at 05:12

## 2019-12-11 RX ADMIN — BACLOFEN 10 MG: 10 TABLET ORAL at 21:16

## 2019-12-11 RX ADMIN — DOCUSATE SODIUM 100 MG: 100 CAPSULE, LIQUID FILLED ORAL at 05:12

## 2019-12-11 RX ADMIN — BACLOFEN 10 MG: 10 TABLET ORAL at 16:18

## 2019-12-11 RX ADMIN — AMPICILLIN SODIUM AND SULBACTAM SODIUM 3 G: 2; 1 INJECTION, POWDER, FOR SOLUTION INTRAMUSCULAR; INTRAVENOUS at 00:38

## 2019-12-11 RX ADMIN — VANCOMYCIN HYDROCHLORIDE 1200 MG: 500 INJECTION, POWDER, LYOPHILIZED, FOR SOLUTION INTRAVENOUS at 02:33

## 2019-12-11 RX ADMIN — FLECAINIDE ACETATE 25 MG: 100 TABLET ORAL at 05:11

## 2019-12-11 ASSESSMENT — ENCOUNTER SYMPTOMS
DIAPHORESIS: 0
VOMITING: 0
FATIGUE: 0
ABDOMINAL PAIN: 0
CHILLS: 0
CONSTIPATION: 0
COUGH: 0
PALPITATIONS: 0
LIGHT-HEADEDNESS: 0
DIZZINESS: 0
COLOR CHANGE: 0
NAUSEA: 0
HEADACHES: 0
DIARRHEA: 0
WHEEZING: 0
SHORTNESS OF BREATH: 0
CHEST TIGHTNESS: 0
FEVER: 0
STRIDOR: 0

## 2019-12-11 ASSESSMENT — CHA2DS2 SCORE
VASCULAR DISEASE: NO
DIABETES: NO
CHA2DS2 VASC SCORE: 2
SEX: MALE
AGE 75 OR GREATER: NO
HYPERTENSION: YES
AGE 65 TO 74: YES
CHF OR LEFT VENTRICULAR DYSFUNCTION: NO
PRIOR STROKE OR TIA OR THROMBOEMBOLISM: NO
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
## 2019-12-11 NOTE — PROGRESS NOTES
Infectious Disease Progress Note    Author: Manuel Valencia M.D. Date & Time of service: 2019  3:23 PM    Chief Complaint:  Follow-up for sacral abscess and chronic osteomyelitis    Interval History:  69 y.o. man well-known to the ID service with a history of C5-7 paraplegia, and history of a stage IV sacral decubitus ulcer complicated by sacral osteomyelitis status post diverting colostomy in July and debridement down to bone in August by Dr. Ansari with pathology positive for acute osteomyelitis and skin flap admitted for sacral wound drainage concerning for infection.      afebrile WBC 5.9.  CT shows sacral abscess and osteomyelitis.  Results of CT discussed with patient.  Plan for IR drainage today with biopsy.   afebrile WBC 5.1.  Patient transferred to telemetry this morning secondary to bradycardia.  He believes he got a double dose of his flecainide yesterday.  He denies any lightheadedness, dizziness or chest pain.  He underwent drain placement with sacral bone biopsy yesterday.  Cultures are pending.   afebrile, white count 6.2 on 12/10.  Tolerating antibiotics.  Abscess cultures with no growth till date.  Bone biopsy with no definitive evidence of osteomyelitis.    Labs Reviewed, Medications Reviewed and Radiology Reviewed.    Review of Systems:  Review of Systems   Constitutional: Negative for chills and fever.   Respiratory: Negative for cough and shortness of breath.    Gastrointestinal: Negative for abdominal pain, diarrhea, nausea and vomiting.   Neurological: Negative for dizziness and headaches.   Limited review of systems as patient is paraplegic    Hemodynamics:  Temp (24hrs), Av.8 °C (98.3 °F), Min:36.3 °C (97.4 °F), Max:37.3 °C (99.2 °F)  Temperature: 37.1 °C (98.7 °F)  Pulse  Av.4  Min: 42  Max: 97   Blood Pressure : (!) 164/91(rn notified)       Physical Exam:  Physical Exam  Constitutional:       Appearance: Normal appearance. He is not ill-appearing.   HENT:       Head: Atraumatic.      Mouth/Throat:      Mouth: Mucous membranes are moist.      Pharynx: No oropharyngeal exudate.   Eyes:      Extraocular Movements: Extraocular movements intact.      Conjunctiva/sclera: Conjunctivae normal.   Neck:      Musculoskeletal: Normal range of motion and neck supple.   Cardiovascular:      Rate and Rhythm: Normal rate and regular rhythm.   Pulmonary:      Effort: Pulmonary effort is normal.      Breath sounds: Normal breath sounds.   Abdominal:      Palpations: Abdomen is soft.      Comments: Ostomy   Genitourinary:     Comments: Sacral wound draining serous fluid  BRENDA drain with small amount of serous fluid  Musculoskeletal:      Comments: Bilateral lower extremity atrophy   Skin:     General: Skin is warm and dry.   Neurological:      Mental Status: He is alert and oriented to person, place, and time.      Sensory: Sensory deficit present.      Coordination: Coordination abnormal.      Deep Tendon Reflexes: Reflexes abnormal.   Psychiatric:         Mood and Affect: Mood normal.         Behavior: Behavior normal.      Comments: Pleasant         Meds:    Current Facility-Administered Medications:   •  polyethylene glycol/lytes  •  rivaroxaban  •  lactobacillus rhamnosus  •  vancomycin  •  baclofen  •  docusate sodium  •  ferrous sulfate  •  finasteride  •  losartan  •  sennosides  •  therapeutic multivitamin-minerals  •  traZODone  •  acetaminophen  •  enalaprilat  •  ampicillin-sulbactam (UNASYN) IV  •  MD Alert...Vancomycin per Pharmacy    Labs:  Recent Labs     12/09/19  0222 12/10/19  0219   WBC 5.1 6.2   RBC 3.46* 3.75*   HEMOGLOBIN 10.4* 10.9*   HEMATOCRIT 32.5* 34.8*   MCV 93.9 92.8   MCH 30.1 29.1   RDW 53.6* 52.0*   PLATELETCT 218 229   MPV 8.7* 8.3*     Recent Labs     12/09/19  0222 12/10/19  0219   SODIUM 143 143   POTASSIUM 3.6 3.8   CHLORIDE 113* 112   CO2 24 26   GLUCOSE 89 84   BUN 8 10     Recent Labs     12/09/19  0222 12/10/19  0219   ALBUMIN  --  3.2   CREATININE  0.53 0.64       Imaging:  Ct-pelvis With    Result Date: 12/7/2019 12/7/2019 3:12 PM HISTORY/REASON FOR EXAM:  Abscess, anal or rectal; PLEASE LOOK AT COCCYX as well. TECHNIQUE/EXAM DESCRIPTION AND NUMBER OF VIEWS: CT scan of the pelvis with contrast. Contrast-enhanced helical scanning of the pelvis was obtained from the iliac crests through the pubic symphysis following the bolus administration of 90 mL of Omnipaque 350 nonionic contrast without complication. Low dose optimization technique was utilized for this CT exam including automated exposure control and adjustment of the mA and/or kV according to patient size. COMPARISON:  7/26/19 FINDINGS: Again there is marked abnormality at the sacrococcygeal junction. The coccyx is displaced 2 cm anteriorly, stable to slightly improved from comparison. The distal aspect of S5 and the proximal aspect of the coccyx are absent, either surgically resected or chronically resorbed. The previously identified decubitus ulcer that was largely gas-filled no longer has clear opening through the dermis but there is gas and fluid in the forward ulcer bed. This abnormal fluid and gas extends 13 cm transverse by 2 cm anterior-posterior by 5 cm craniocaudal and is in direct contact with the distal sacrum and the dorsal margin of the coccyx. Some gas tracks along the levator ani but does not extend above it. A small amount of soft tissue gas extends above this fluid and gas collection, is seen over the dorsal margin of S3 There is presacral edema/fat stranding but this is improved from comparison Mild new bony destruction is identified at the terminus of the sacrum where the length of the sacrum is slightly diminished and it is sclerotic which can be seen with chronic osteomyelitis There is a left lower quadrant colostomy with no bowel obstruction. Normal caliber appendix There is a left nonobstructive 8 mm nephrolith, measuring Hounsfield units Cazares catheter in a mostly decompressed  "bladder. The wall is diffusely thickened. There is some dependent hyperdensity which is compatible with urolith     1.  Interval development of a 13 x 5 x 2 cm gas and fluid collection overlying the dorsal sacrococcygeal junction region where there used to be a large open decubitus ulcer. This suggests abscess formation with presumed draining sinus 2.  Anteriorly displaced distal coccyx with new S4 partial sacral volume loss and sclerosis indicating osteomyelitis favored over interval partial sacrectomy 3.  For catheter placement diffuse bilateral thickening as before. This indicates chronic cystitis and/or outlet obstruction and there is some new depending calcification likely from urolith favored over bladder wall calcification 4.  Otherwise stable evaluation following left lower quadrant colostomy, no bowel obstruction. 8 mm nonobstructive left nephrolith. Severe atherosclerosis.      Micro:  Results     Procedure Component Value Units Date/Time    BLOOD CULTURE [[id number]] Collected:  12/06/19 1225    Order Status:  Completed Specimen:  Blood from Peripheral Updated:  12/11/19 1500     Significant Indicator NEG     Source BLD     Site PERIPHERAL     Culture Result No growth after 5 days of incubation.    Narrative:       Per Hospital Policy: Only change Specimen Src: to \"Line\" if  specified by physician order.  Right AC    BLOOD CULTURE [[id number]] Collected:  12/06/19 1213    Order Status:  Completed Specimen:  Blood from Peripheral Updated:  12/11/19 1300     Significant Indicator NEG     Source BLD     Site PERIPHERAL     Culture Result No growth after 5 days of incubation.    Narrative:       Per Hospital Policy: Only change Specimen Src: to \"Line\" if  specified by physician order.  No site indicated    CULTURE TISSUE W/ GRM STAIN [[id number]] Collected:  12/08/19 1740    Order Status:  Completed Specimen:  Tissue Updated:  12/11/19 0753     Significant Indicator NEG     Source TISS     Site Sacral Bone " Biopsy     Culture Result No growth at 48 hours.     Gram Stain Result No organisms seen.    Narrative:       Radiology specimen    FLUID CULTURE W/GRAM STAIN [[id number]] Collected:  12/08/19 1735    Order Status:  Completed Specimen:  Other Body Fluid Updated:  12/11/19 0742     Significant Indicator NEG     Source BF     Site Sacral Abscess     Culture Result No growth at 72 hours.     Gram Stain Result Many WBCs.  No organisms seen.      Narrative:       Collected By:[id number] ORIANA PHAM  Sacral abscess  Collected By:[id number] ORIANA PHAM    GRAM STAIN [[id number]] Collected:  12/08/19 1740    Order Status:  Completed Specimen:  Tissue Updated:  12/09/19 1357     Significant Indicator .     Source TISS     Site Sacral Bone Biopsy     Gram Stain Result No organisms seen.    Narrative:       Radiology specimen    GRAM STAIN [[id number]] Collected:  12/08/19 1735    Order Status:  Completed Specimen:  Body Fluid Updated:  12/08/19 2035     Significant Indicator .     Source BF     Site Sacral Abscess     Gram Stain Result Many WBCs.  No organisms seen.      Narrative:       Collected By:[id number] ORIANA PHAM  Sacral abscess  Collected By:[id number] ORIANA PHAM    CULTURE WOUND W/ GRAM STAIN [[id number]] Collected:  12/06/19 1234    Order Status:  Completed Specimen:  Wound from Decubitus Updated:  12/08/19 0933     Significant Indicator NEG     Source WND     Site DECUBITUS     Culture Result Moderate growth mixed skin ade.     Gram Stain Result Many WBCs.  Rare Gram positive cocci.      CULTURE WOUND W/ GRAM STAIN [[id number]]     Order Status:  No result Specimen:  Wound from Back     GRAM STAIN [[id number]] Collected:  12/06/19 1234    Order Status:  Completed Specimen:  Wound Updated:  12/06/19 1552     Significant Indicator .     Source WND     Site DECUBITUS     Gram Stain Result Many WBCs.  Rare Gram positive cocci.            Assessment/Plan:  Sacral abscess (noted on CT)  CT pelvis showed a 13x5x2 cm gas and fluid  collection  No fevers  No leukocytosis  ESR 55  Status post IR drain and sacral bone biopsy on 12/8.  Cultures no growth till date, bone biopsy with no definitive evidence of osteomyelitis  Seen by plastic surgery-no surgical intervention  Continue Unasyn and vancomycin for now  Monitor renal function and Vanco trough  Last Vanco trough 15.4 on 12/11  Recommend repeat CT to look for improvement in the abscess.  If source control has been achieved, will likely be able to complete a 2 to 4-week course of oral Augmentin and doxycycline cover previously grown organisms    Chronic osteomyelitis of the sacrum, recently treated  Status post debridement with bone biopsies by Dr. Moon on 8/21/2019.  Pathology consistent with acute osteomyelitis.  Cultures grew MRSE and he completed a 6-week course of vancomycin, ceftriaxone and Flagyl through 10/1/2019 at [facility].  Status post skin flap by Dr. Moon  CT pelvis-displaced distal coccyx with new S4 partial sacral volume loss and sclerosis indicating osteomyelitis  Wound culture -mixed skin ade  Status post bone biopsy on 12/9  Pathology- no definitive evidence of osteomyelitis  Antibiotic plan as above    Neurogenic bladder  With chronic indwelling catheter    C5-7 paraplegia  Contributing to sacral wounds and recurrent infections, difficult offloading    Plan of care discussed with internal medicine/Dr. Parnell    ID will follow.  Please call with questions.

## 2019-12-11 NOTE — CARE PLAN
Problem: Communication  Goal: The ability to communicate needs accurately and effectively will improve  Outcome: PROGRESSING AS EXPECTED  Note:   Call light within reach. Educated pt to use call light as needed. Reorient and re-educate as needed. Continue to monitor.       Problem: Skin Integrity  Goal: Risk for impaired skin integrity will decrease  Outcome: PROGRESSING SLOWER THAN EXPECTED  Note:   Assess skin and monitor for skin breakdown. Alleviate pressure to bony prominences and provide assistance with turning, repositioning, ROM and mobility as appropriate. Use of barrier cream, float boots, redistribution mattress, extra pillows as needed. Continue to monitor.

## 2019-12-11 NOTE — THERAPY
OT orders received.  Pt is WC bound at baseline and transfers with slideboard.  Pt currently with sacral wound.  OOB activity/transfers and not appropriate for pt at this time due to wound.  Pt observed eating lunch and using utensils, and reports no difficulty with feeding/grooming tasks at this time.  Pt has given pt theraband for UE exercise.  Acute OT services are not indicated at this time.  Please re-order when/if pt able to participate in slideboard/OOB activity.
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
## 2019-12-11 NOTE — WOUND TEAM
"[facility]  Inpatient Services  Initial Wound and Skin Care Evaluation    Admission Date: 12/6/2019     Consult Date: 12/11  HPI, PMH, SH: Reviewed    Unit where seen by Wound Team: [id number]     WOUND CONSULT RELATED TO:  Colostomy, Left posterior calf, coccyx     SUBJECTIVE:  \" I didn't know I had this \"      Self Report / Pain Level:  denies           OBJECTIVE:  Pt on  Bed with prevalon mat, bilateral moon boot, bilateral heel mepilex in place. Old tubi  on.    WOUND TYPE, LOCATION, CHARACTERISTICS (Pressure Injuries: location, stage, POA or date identified)      Wound 12/11/19 Full Thickness Wound Coccyx Pressure injury vs full-thickness open abcess (Active)   Site Assessment Drainage;Pink    Raeann-wound Assessment Pale;Pink    Margins Attached edges;Defined edges    Wound Length (cm) 1 cm    Wound Width (cm) 1 cm    Wound Surface Area (cm^2) 1 cm^2    Tunneling 0 cm    Undermining 9 cm    Closure None    Drainage Amount Small    Drainage Description Serosanguineous    Non-staged Wound Description Full thickness    Treatments Cleansed;Site care    Cleansing Normal Saline Irrigation    Periwound Protectant Not Applicable    Dressing Options Silver Strip Packing;Mepilex    Dressing Cleansing/Solutions Not Applicable    Dressing Changed New    Dressing Status Clean;Dry;Intact    Dressing Change Frequency Daily    NEXT Dressing Change  12/12/19    NEXT Weekly Photo (Inpatient Only) 12/13/19    WOUND NURSE ONLY - Odor None    WOUND NURSE ONLY - Exposed Structures Other (Comments);None    WOUND NURSE ONLY - Tissue Type and Percentage bone structure felt               Wound 12/10/19Full thickness LLE calf (Active)   Wound Image      Site Assessment Dry;Brown;Pink    Raeann-wound Assessment Pale;Pink    Margins Attached edges    Wound Length (cm) 1 cm    Wound Width (cm) 1.5 cm    Wound Depth (cm) 0 cm    Wound Surface Area (cm^2) 1.5 cm^2    Tunneling 0 cm    Undermining 0 cm    Closure None    Drainage " Amount Scant    Drainage Description Sanguineous    Non-staged Wound Description Not applicable    Treatments Cleansed;Site care    Cleansing Not Applicable    Periwound Protectant Not Applicable    Dressing Options Mepilex    Dressing Cleansing/Solutions Not Applicable    Dressing Changed New    Dressing Status Clean;Dry;Intact    Dressing Change Frequency As Needed    NEXT Weekly Photo (Inpatient Only) 12/18/19    WOUND NURSE ONLY - Odor None    WOUND NURSE ONLY - Pulses 1+;Thready;DP;PT;Left;Right    WOUND NURSE ONLY - Exposed Structures None    WOUND NURSE ONLY - Tissue Type and Percentage red pink tissue    WOUND NURSE ONLY - Time Spent with Patient (mins) 60          Vascular:    Dorsal Pedal pulses:  RLE, LLE +1 thready   Posterior tib pulses:   RLE, LLE +1 thready    JUAN MANUEL:      NA    Lab Values:    Lab Results   Component Value Date/Time    WBC 6.2 12/10/2019 02:19 AM    RBC 3.75 (L) 12/10/2019 02:19 AM    HEMOGLOBIN 10.9 (L) 12/10/2019 02:19 AM    HEMATOCRIT 34.8 (L) 12/10/2019 02:19 AM        No results found for: HBA1C        Culture:   NA- sacral bone biopsy done 12/6, negative for growth  INTERVENTIONS BY WOUND TEAM:  Removed previous dressing, sacral wound cleansed with Ns and gauze. Silver packing used to fill undermining. 12 cm of packing left outside the wound and Secured with sacral mepilex. Sacral mepilex along R hip used to offload pressure from BRENDA drain tube.    L posterior calf scab dry, ok to Leave JOSE JUAN. BLE heel mepilex in place. One mepilex applied to scab area to prevent pressure injury. Removed old tubigrips and applied new Tubi  E bilaterally.    Dressing Selection:  Silver packing, sacral mepilex, heel mepilex, tubi  E    Interdisciplinary consultation: Patient, Bedside RN, [place] wound team    EVALUATION: Pt is paraplegic with decubitus ulcer to sacrum. Was seen by Dr. Ansari in August of this year for debridement and washout of sacral ulcer cavity including flap closure. Pt had  developed osteomyelitis in July, prior to 's procedure. Pt is now admitted for concern of ongoing osteomyelitis, however sacral biopsy has been negative for growth and pathology showed no osteomyelitis.   Silver 1/4 in packing used in ulcer cavity to decrease the risk of infection. Pt may benefit from wound vac therapy if skin flap is removed, thus allowing greater area of coverage. Pt did undergo vera neftali therapy during last admission 7/24/19 for sacral ulcer.     Left posterior calf is ongoing issue for this pt. States he has had wound for 50 years. Scab is dry and JOSE JUAN, does not appear to be over bony prominence. Pt reports scar to L heel, however skin intact and blanching. Bilateral heel Mepilex and moon boots applied to reduce risk of pressure injury development. Nursing to continue pressure injury prevention.    Factors affecting wound healing: paraplegia   Goals: Steady decrease in wound area and depth weekly.    NURSING PLAN OF CARE ORDERS (X):    Dressing changes: See Dressing Care orders: x  Skin care: See Skin Care orders: x  Rectal tube care: See Rectal Tube Care orders:   Other orders:    RSKIN: CURRENT (X) ORDERED (O):   Q shift Luis Enrique:  X  Q shift pressure point assessments:  X  Pressure redistribution mattress     JERARDO x     overlay     Bariatric JERARDO         Bariatric foam           Heel float boots  x    Heel silicone dressing   x  Float Heels off Bed with Pillows               Barrier wipes         Barrier Cream         Barrier paste    x      Sacral silicone dressing     Silicone O2 tubing         Anchorfast         Cannula fixation Device (Tender )          Gray Foam Ear protectors           Trach with Optifoam split foam                 Waffle cushion        Waffle Overlay         Rectal tube or BMS   Purwick/Condom Cath         Antifungal tx      Interdry          Reposition q 2 hours    x    Up to chair        Ambulate      PT/OT        Dietician        Diabetes Education      PO  x  "  TF     TPN     NPO   # days   Other        WOUND TEAM PLAN OF CARE (X):   NPWT change 3 x week:        Dressing changes by wound team:       Follow up as needed:     Nursing to perform dressing changes, WT to follow weekly.  Other (explain):     Anticipated discharge plans (X): Will need ongoing wound care  SNF:    x       Home Care:           Outpatient Wound Center:            Self Care:            Other:                [facility]     Inpatient Services     Established Ostomy Management/ troubleshooting      HPI: Reviewed  PMH: Reviewed   SH: Reviewed   Reason for Ostomy nurse consult:  Established ostomy management, supplies provided.      Objective:  Appliance intact  Ostomy type: loop colostomy  Stoma location: RLQ  Stoma assessment:    Appearance:  Red, oval   Size: 1.5\"   Protrusion: flush with skin   Output: formed stool   MC jxn: intact   Peristomal skin: intact    Ostomy Appliance (type and size): 2 piece 2 3/4\" with paste ring     Interventions and Education: Previous appliance removed and disposed. Peristomal skin cleansed and patted dry. Barrier traced and cut and paste ring applied to barrier opening. Barrier adhered to skin and pouch connected.      Evaluation: Pt has had ostomy since July, is able to perform self care.     Plan: Ostomy nurse to continue following as needed for further ostomy needs.    Anticipated discharge needs: LTAC      "

## 2019-12-12 LAB
ALBUMIN SERPL BCP-MCNC: 3.4 G/DL (ref 3.2–4.9)
BACTERIA TISS AEROBE CULT: NORMAL
BUN SERPL-MCNC: 12 MG/DL (ref 8–22)
CALCIUM SERPL-MCNC: 8.5 MG/DL (ref 8.5–10.5)
CHLORIDE SERPL-SCNC: 110 MMOL/L (ref 96–112)
CO2 SERPL-SCNC: 24 MMOL/L (ref 20–33)
CREAT SERPL-MCNC: 0.69 MG/DL (ref 0.5–1.4)
ERYTHROCYTE [DISTWIDTH] IN BLOOD BY AUTOMATED COUNT: 52.7 FL (ref 35.9–50)
GLUCOSE SERPL-MCNC: 93 MG/DL (ref 65–99)
GRAM STN SPEC: NORMAL
HCT VFR BLD AUTO: 35 % (ref 42–52)
HGB BLD-MCNC: 11.1 G/DL (ref 14–18)
MCH RBC QN AUTO: 29.4 PG (ref 27–33)
MCHC RBC AUTO-ENTMCNC: 31.7 G/DL (ref 33.7–35.3)
MCV RBC AUTO: 92.6 FL (ref 81.4–97.8)
PHOSPHATE SERPL-MCNC: 3.3 MG/DL (ref 2.5–4.5)
PLATELET # BLD AUTO: 214 K/UL (ref 164–446)
PMV BLD AUTO: 8.6 FL (ref 9–12.9)
POTASSIUM SERPL-SCNC: 3.8 MMOL/L (ref 3.6–5.5)
RBC # BLD AUTO: 3.78 M/UL (ref 4.7–6.1)
SIGNIFICANT IND 70042: NORMAL
SITE SITE: NORMAL
SODIUM SERPL-SCNC: 143 MMOL/L (ref 135–145)
SOURCE SOURCE: NORMAL
WBC # BLD AUTO: 7.2 K/UL (ref 4.8–10.8)

## 2019-12-12 PROCEDURE — 80069 RENAL FUNCTION PANEL: CPT

## 2019-12-12 PROCEDURE — 700111 HCHG RX REV CODE 636 W/ 250 OVERRIDE (IP): Performed by: INTERNAL MEDICINE

## 2019-12-12 PROCEDURE — A9270 NON-COVERED ITEM OR SERVICE: HCPCS | Performed by: HOSPITALIST

## 2019-12-12 PROCEDURE — 99232 SBSQ HOSP IP/OBS MODERATE 35: CPT | Performed by: INTERNAL MEDICINE

## 2019-12-12 PROCEDURE — 700111 HCHG RX REV CODE 636 W/ 250 OVERRIDE (IP): Performed by: HOSPITALIST

## 2019-12-12 PROCEDURE — 770020 HCHG ROOM/CARE - TELE (206)

## 2019-12-12 PROCEDURE — 700102 HCHG RX REV CODE 250 W/ 637 OVERRIDE(OP): Performed by: HOSPITALIST

## 2019-12-12 PROCEDURE — 700102 HCHG RX REV CODE 250 W/ 637 OVERRIDE(OP): Performed by: INTERNAL MEDICINE

## 2019-12-12 PROCEDURE — A9270 NON-COVERED ITEM OR SERVICE: HCPCS | Performed by: INTERNAL MEDICINE

## 2019-12-12 PROCEDURE — 99232 SBSQ HOSP IP/OBS MODERATE 35: CPT | Performed by: NURSE PRACTITIONER

## 2019-12-12 PROCEDURE — 700105 HCHG RX REV CODE 258: Performed by: HOSPITALIST

## 2019-12-12 PROCEDURE — 36415 COLL VENOUS BLD VENIPUNCTURE: CPT

## 2019-12-12 PROCEDURE — 700105 HCHG RX REV CODE 258: Performed by: INTERNAL MEDICINE

## 2019-12-12 PROCEDURE — 85027 COMPLETE CBC AUTOMATED: CPT

## 2019-12-12 PROCEDURE — 99232 SBSQ HOSP IP/OBS MODERATE 35: CPT | Performed by: HOSPITALIST

## 2019-12-12 PROCEDURE — 700112 HCHG RX REV CODE 229: Performed by: HOSPITALIST

## 2019-12-12 RX ORDER — AMOXICILLIN AND CLAVULANATE POTASSIUM 875; 125 MG/1; MG/1
1 TABLET, FILM COATED ORAL EVERY 12 HOURS
Status: DISCONTINUED | OUTPATIENT
Start: 2019-12-12 | End: 2019-12-21

## 2019-12-12 RX ORDER — DOXYCYCLINE 100 MG/1
100 TABLET ORAL EVERY 12 HOURS
Status: DISCONTINUED | OUTPATIENT
Start: 2019-12-12 | End: 2019-12-21

## 2019-12-12 RX ADMIN — BACLOFEN 10 MG: 10 TABLET ORAL at 17:05

## 2019-12-12 RX ADMIN — SENNOSIDES 17.2 MG: 8.6 TABLET, FILM COATED ORAL at 05:00

## 2019-12-12 RX ADMIN — RIVAROXABAN 20 MG: 20 TABLET, FILM COATED ORAL at 17:04

## 2019-12-12 RX ADMIN — LOSARTAN POTASSIUM 50 MG: 50 TABLET, FILM COATED ORAL at 05:01

## 2019-12-12 RX ADMIN — FINASTERIDE 5 MG: 5 TABLET, FILM COATED ORAL at 05:00

## 2019-12-12 RX ADMIN — AMOXICILLIN AND CLAVULANATE POTASSIUM 1 TABLET: 875; 125 TABLET, FILM COATED ORAL at 17:04

## 2019-12-12 RX ADMIN — VANCOMYCIN HYDROCHLORIDE 1200 MG: 500 INJECTION, POWDER, LYOPHILIZED, FOR SOLUTION INTRAVENOUS at 00:04

## 2019-12-12 RX ADMIN — BACLOFEN 10 MG: 10 TABLET ORAL at 12:43

## 2019-12-12 RX ADMIN — DOCUSATE SODIUM 100 MG: 100 CAPSULE, LIQUID FILLED ORAL at 05:00

## 2019-12-12 RX ADMIN — DOCUSATE SODIUM 100 MG: 100 CAPSULE, LIQUID FILLED ORAL at 17:05

## 2019-12-12 RX ADMIN — ENALAPRILAT 1.25 MG: 1.25 INJECTION INTRAVENOUS at 08:25

## 2019-12-12 RX ADMIN — DOXYCYCLINE 100 MG: 100 TABLET, FILM COATED ORAL at 17:04

## 2019-12-12 RX ADMIN — POLYETHYLENE GLYCOL 3350 1 PACKET: 17 POWDER, FOR SOLUTION ORAL at 05:00

## 2019-12-12 RX ADMIN — Medication 1 CAPSULE: at 05:01

## 2019-12-12 RX ADMIN — BACLOFEN 10 MG: 10 TABLET ORAL at 08:25

## 2019-12-12 RX ADMIN — FERROUS SULFATE TAB 325 MG (65 MG ELEMENTAL FE) 325 MG: 325 (65 FE) TAB at 05:00

## 2019-12-12 RX ADMIN — FERROUS SULFATE TAB 325 MG (65 MG ELEMENTAL FE) 325 MG: 325 (65 FE) TAB at 17:04

## 2019-12-12 RX ADMIN — MULTIPLE VITAMINS W/ MINERALS TAB 1 TABLET: TAB at 05:00

## 2019-12-12 RX ADMIN — AMPICILLIN SODIUM AND SULBACTAM SODIUM 3 G: 2; 1 INJECTION, POWDER, FOR SOLUTION INTRAMUSCULAR; INTRAVENOUS at 05:00

## 2019-12-12 ASSESSMENT — ENCOUNTER SYMPTOMS
FOCAL WEAKNESS: 1
DIZZINESS: 0
CONSTIPATION: 0
DIARRHEA: 0
NAUSEA: 0
HEADACHES: 0
CHILLS: 0
VOMITING: 0
ABDOMINAL PAIN: 0
SHORTNESS OF BREATH: 0
COUGH: 0
FEVER: 0
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
## 2019-12-12 NOTE — FACE TO FACE
Face to Face Supporting Documentation - Home Health    The encounter with this patient was in whole or in part the primary reason for home health admission.    Date of encounter:   Patient:                    MRN:                       YOB: 2019  Osvaldo Pate  [id number]  1950     Home health to see patient for:  Skilled Nursing care for assessment, interventions & education, Wound Care and Physical Therapy evaluation and treatment    Skilled need for:  Exacerbation of Chronic Disease State Sacral abscess, chronic osteomyelitis of sacrum, C5-7 paraplegia, A. fib looking into ablation    Skilled nursing interventions to include:  Wound Care    Homebound status evidenced by:  Needs the assistance of another person in order to leave the home. Leaving home requires a considerable and taxing effort. There is a normal inability to leave the home.    Community Physician to provide follow up care: Pcp Pt States None     Optional Interventions? No      I certify the face to face encounter for this home health care referral meets the CMS requirements and the encounter/clinical assessment with the patient was, in whole, or in part, for the medical condition(s) listed above, which is the primary reason for home health care. Based on my clinical findings: the service(s) are medically necessary, support the need for home health care, and the homebound criteria are met.  I certify that this patient has had a face to face encounter by myself.  Ramon Parnell M.D. - NPI: [id number]

## 2019-12-12 NOTE — CARE PLAN
Problem: Bowel/Gastric:  Goal: Normal bowel function is maintained or improved  Outcome: PROGRESSING AS EXPECTED     Problem: Knowledge Deficit  Goal: Knowledge of the prescribed therapeutic regimen will improve  Outcome: PROGRESSING AS EXPECTED
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
## 2019-12-12 NOTE — PROGRESS NOTES
Infectious Disease Progress Note    Author: Manuel Valencia M.D. Date & Time of service: 2019  11:44 AM    Chief Complaint:  Follow-up for sacral abscess and chronic osteomyelitis    Interval History:  69 y.o. man well-known to the ID service with a history of C5-7 paraplegia, and history of a stage IV sacral decubitus ulcer complicated by sacral osteomyelitis status post diverting colostomy in July and debridement down to bone in August by Dr. Ansari with pathology positive for acute osteomyelitis and skin flap admitted for sacral wound drainage concerning for infection.      afebrile WBC 5.9.  CT shows sacral abscess and osteomyelitis.  Results of CT discussed with patient.  Plan for IR drainage today with biopsy.   afebrile WBC 5.1.  Patient transferred to telemetry this morning secondary to bradycardia.  He believes he got a double dose of his flecainide yesterday.  He denies any lightheadedness, dizziness or chest pain.  He underwent drain placement with sacral bone biopsy yesterday.  Cultures are pending.   afebrile, white count 6.2 on 12/10.  Tolerating antibiotics.  Abscess cultures with no growth till date.  Bone biopsy with no definitive evidence of osteomyelitis.   afebrile, white count 7.2.  Patient reporting no issues, tolerating antibiotics, abscess cultures remain negative.  Repeat CT with improvement and no further drainable fluid collection.    Labs Reviewed, Medications Reviewed and Radiology Reviewed.    Review of Systems:  Review of Systems   Constitutional: Negative for chills and fever.   Respiratory: Negative for cough and shortness of breath.    Gastrointestinal: Negative for abdominal pain, diarrhea, nausea and vomiting.   Neurological: Negative for dizziness and headaches.   Limited review of systems as patient is paraplegic    Hemodynamics:  Temp (24hrs), Av.7 °C (98.1 °F), Min:36.6 °C (97.8 °F), Max:37.1 °C (98.7 °F)  Temperature: 36.6 °C (97.8 °F)  Pulse   Av.8  Min: 42  Max: 97   Blood Pressure : 100/59       Physical Exam:  Physical Exam  Constitutional:       Appearance: Normal appearance. He is not ill-appearing.   HENT:      Head: Atraumatic.      Mouth/Throat:      Mouth: Mucous membranes are moist.      Pharynx: No oropharyngeal exudate.   Eyes:      Extraocular Movements: Extraocular movements intact.      Conjunctiva/sclera: Conjunctivae normal.   Neck:      Musculoskeletal: Normal range of motion and neck supple.   Cardiovascular:      Rate and Rhythm: Normal rate and regular rhythm.   Pulmonary:      Effort: Pulmonary effort is normal.      Breath sounds: Normal breath sounds.   Abdominal:      Palpations: Abdomen is soft.      Comments: Ostomy   Genitourinary:     Comments: Sacral wound draining serous fluid  BRENDA drain with small amount of serous fluid  Musculoskeletal:      Comments: Bilateral lower extremity atrophy   Skin:     General: Skin is warm and dry.   Neurological:      Mental Status: He is alert and oriented to person, place, and time.      Sensory: Sensory deficit present.      Coordination: Coordination abnormal.      Deep Tendon Reflexes: Reflexes abnormal.   Psychiatric:         Mood and Affect: Mood normal.         Behavior: Behavior normal.      Comments: Pleasant         Meds:    Current Facility-Administered Medications:   •  polyethylene glycol/lytes  •  losartan  •  rivaroxaban  •  lactobacillus rhamnosus  •  vancomycin  •  baclofen  •  docusate sodium  •  ferrous sulfate  •  finasteride  •  sennosides  •  therapeutic multivitamin-minerals  •  traZODone  •  acetaminophen  •  enalaprilat  •  ampicillin-sulbactam (UNASYN) IV  •  MD Alert...Vancomycin per Pharmacy    Labs:  Recent Labs     12/10/19  0219 12/12/19  0026   WBC 6.2 7.2   RBC 3.75* 3.78*   HEMOGLOBIN 10.9* 11.1*   HEMATOCRIT 34.8* 35.0*   MCV 92.8 92.6   MCH 29.1 29.4   RDW 52.0* 52.7*   PLATELETCT 229 214   MPV 8.3* 8.6*     Recent Labs     12/10/19  0219 12/12/19  0026    SODIUM 143 143   POTASSIUM 3.8 3.8   CHLORIDE 112 110   CO2 26 24   GLUCOSE 84 93   BUN 10 12     Recent Labs     12/10/19  0219 12/12/19  0026   ALBUMIN 3.2 3.4   CREATININE 0.64 0.69       Imaging:  Ct-pelvis With    Result Date: 12/7/2019 12/7/2019 3:12 PM HISTORY/REASON FOR EXAM:  Abscess, anal or rectal; PLEASE LOOK AT COCCYX as well. TECHNIQUE/EXAM DESCRIPTION AND NUMBER OF VIEWS: CT scan of the pelvis with contrast. Contrast-enhanced helical scanning of the pelvis was obtained from the iliac crests through the pubic symphysis following the bolus administration of 90 mL of Omnipaque 350 nonionic contrast without complication. Low dose optimization technique was utilized for this CT exam including automated exposure control and adjustment of the mA and/or kV according to patient size. COMPARISON:  7/26/19 FINDINGS: Again there is marked abnormality at the sacrococcygeal junction. The coccyx is displaced 2 cm anteriorly, stable to slightly improved from comparison. The distal aspect of S5 and the proximal aspect of the coccyx are absent, either surgically resected or chronically resorbed. The previously identified decubitus ulcer that was largely gas-filled no longer has clear opening through the dermis but there is gas and fluid in the forward ulcer bed. This abnormal fluid and gas extends 13 cm transverse by 2 cm anterior-posterior by 5 cm craniocaudal and is in direct contact with the distal sacrum and the dorsal margin of the coccyx. Some gas tracks along the levator ani but does not extend above it. A small amount of soft tissue gas extends above this fluid and gas collection, is seen over the dorsal margin of S3 There is presacral edema/fat stranding but this is improved from comparison Mild new bony destruction is identified at the terminus of the sacrum where the length of the sacrum is slightly diminished and it is sclerotic which can be seen with chronic osteomyelitis There is a left lower quadrant  "colostomy with no bowel obstruction. Normal caliber appendix There is a left nonobstructive 8 mm nephrolith, measuring Hounsfield units Cazares catheter in a mostly decompressed bladder. The wall is diffusely thickened. There is some dependent hyperdensity which is compatible with urolith     1.  Interval development of a 13 x 5 x 2 cm gas and fluid collection overlying the dorsal sacrococcygeal junction region where there used to be a large open decubitus ulcer. This suggests abscess formation with presumed draining sinus 2.  Anteriorly displaced distal coccyx with new S4 partial sacral volume loss and sclerosis indicating osteomyelitis favored over interval partial sacrectomy 3.  For catheter placement diffuse bilateral thickening as before. This indicates chronic cystitis and/or outlet obstruction and there is some new depending calcification likely from urolith favored over bladder wall calcification 4.  Otherwise stable evaluation following left lower quadrant colostomy, no bowel obstruction. 8 mm nonobstructive left nephrolith. Severe atherosclerosis.      Micro:  Results     Procedure Component Value Units Date/Time    CULTURE TISSUE W/ GRM STAIN [[id number]] Collected:  12/08/19 1740    Order Status:  Completed Specimen:  Tissue Updated:  12/12/19 0733     Significant Indicator NEG     Source TISS     Site Sacral Bone Biopsy     Culture Result No growth at 72 hours.     Gram Stain Result No organisms seen.    Narrative:       Radiology specimen    BLOOD CULTURE [[id number]] Collected:  12/06/19 1225    Order Status:  Completed Specimen:  Blood from Peripheral Updated:  12/11/19 1500     Significant Indicator NEG     Source BLD     Site PERIPHERAL     Culture Result No growth after 5 days of incubation.    Narrative:       Per Hospital Policy: Only change Specimen Src: to \"Line\" if  specified by physician order.  Right AC    BLOOD CULTURE [[id number]] Collected:  12/06/19 1213    Order Status:  Completed Specimen: " " Blood from Peripheral Updated:  12/11/19 1300     Significant Indicator NEG     Source BLD     Site PERIPHERAL     Culture Result No growth after 5 days of incubation.    Narrative:       Per Hospital Policy: Only change Specimen Src: to \"Line\" if  specified by physician order.  No site indicated    FLUID CULTURE W/GRAM STAIN [[id number]] Collected:  12/08/19 1735    Order Status:  Completed Specimen:  Other Body Fluid Updated:  12/11/19 0742     Significant Indicator NEG     Source BF     Site Sacral Abscess     Culture Result No growth at 72 hours.     Gram Stain Result Many WBCs.  No organisms seen.      Narrative:       Collected By:[id number] ORIANA PHAM  Sacral abscess  Collected By:[id number] ORIANA PHAM    GRAM STAIN [[id number]] Collected:  12/08/19 1740    Order Status:  Completed Specimen:  Tissue Updated:  12/09/19 1357     Significant Indicator .     Source TISS     Site Sacral Bone Biopsy     Gram Stain Result No organisms seen.    Narrative:       Radiology specimen    GRAM STAIN [[id number]] Collected:  12/08/19 1735    Order Status:  Completed Specimen:  Body Fluid Updated:  12/08/19 2035     Significant Indicator .     Source BF     Site Sacral Abscess     Gram Stain Result Many WBCs.  No organisms seen.      Narrative:       Collected By:[id number] ORIANA PHAM  Sacral abscess  Collected By:[id number] ORIANA PHAM    CULTURE WOUND W/ GRAM STAIN [[id number]] Collected:  12/06/19 1234    Order Status:  Completed Specimen:  Wound from Decubitus Updated:  12/08/19 0933     Significant Indicator NEG     Source WND     Site DECUBITUS     Culture Result Moderate growth mixed skin ade.     Gram Stain Result Many WBCs.  Rare Gram positive cocci.      CULTURE WOUND W/ GRAM STAIN [[id number]]     Order Status:  No result Specimen:  Wound from Back     GRAM STAIN [[id number]] Collected:  12/06/19 1234    Order Status:  Completed Specimen:  Wound Updated:  12/06/19 1552     Significant Indicator .     Source WND     Site " DECUBITUS     Gram Stain Result Many WBCs.  Rare Gram positive cocci.            Assessment/Plan:  Sacral abscess (noted on CT)  CT pelvis showed a 13x5x2 cm gas and fluid collection  No fevers  No leukocytosis  ESR 55  Status post IR drain and sacral bone biopsy on 12/8.  Cultures no growth till date, bone biopsy with no definitive evidence of osteomyelitis  Seen by plastic surgery-no surgical intervention  Received IV Unasyn and vancomycin   Repeat CT pelvis from 12/11 with improvement, no further drainable fluid collection noted  Will thus switch to oral Augmentin 875 mg twice daily and doxycycline 100 mg twice daily to complete 4 weeks from drainage which would be a stop date of 1/4/2020  Continue wound care    Chronic osteomyelitis of the sacrum, recently treated  At last admission.  Status post debridement with bone biopsies by Dr. Moon on 8/21/2019.  Pathology consistent with acute osteomyelitis.  Cultures grew MRSE and he completed a 6-week course of vancomycin, ceftriaxone and Flagyl through 10/1/2019 at [facility].  Status post skin flap by Dr. Moon  CT pelvis-displaced distal coccyx with new S4 partial sacral volume loss and sclerosis indicating osteomyelitis  Wound culture -mixed skin ade  Status post bone biopsy on 12/9  Pathology- no definitive evidence of osteomyelitis  Antibiotic plan as above    Neurogenic bladder  With chronic indwelling catheter    C5-7 paraplegia  Contributing to sacral wounds and recurrent infections, difficult offloading    Plan of care discussed with internal medicine/Dr. Parnell    ID will sign off.  Please call with questions.    ID clinic follow-up in 2 to 3 weeks

## 2019-12-12 NOTE — PROGRESS NOTES
Received bedside report from ZAIRA Abdalla. POC discussed. First assessment completed, call light within reach. Fall precautions within place. Patient finished eating dinner, sitting up in bed watching TV. No c/o pain. Will continue to monitor.

## 2019-12-12 NOTE — PROGRESS NOTES
"Pharmacy Kinetics 69 y.o. male on vancomycin day # 6 2019    Currently on vancomycin 1200 mg IV q12h  Provider specified end date: TBD     Indication for Treatment: Sacral osteomyelitis     Pertinent history per medical record: Transferred from assisted living facility on 2019 for worsening sacral wound. Patient with history of sacral decubitus ulcer d/t paraplegia. Patient s/p diverting colostomy in July to aid in wound healing as well as debridement of sacral osteomyelitis in August. Patient completed a six week course of vanco/ceftriaxone/flagyl in October and has previously been seen by ID. Patient reportedly has new wheelchair and cushion but noted the cushion deflated and a new wound developed. Empiric antibiotics initiated for treatment of the wound and patient underwent CT-guided drain placement with bone biopsy on . Plastic surgery and ID consulting. ID to repeat CT for evidence of source control.     Other antibiotics: ampicillin/sulbactam 3 g IV q6h     Allergies: Sulfa drugs      Concerns for renal function include age, SCr not true reflection of renal function d/t paraplegia & contrast for CT on      Pertinent cultures to date:   : sacral bone biopsy - NG at 48h  : sacral abscess - NG at 72h  12: decubitus wound cx - Moderate growth mixed skin ade  : peripheral blood cx X2 - NG    Recent Labs     19  0222 12/10/19  0219   WBC 5.1 6.2     Recent Labs     19  0222 12/10/19  0219   BUN 8 10   CREATININE 0.53 0.64   ALBUMIN  --  3.2     Recent Labs     19  0027   VANCOTROUGH 15.4       Intake/Output Summary (Last 24 hours) at 2019 1650  Last data filed at 2019 0521  Gross per 24 hour   Intake 260 ml   Output 1910 ml   Net -1650 ml      BP (!) 164/91   Pulse 71   Temp 37.1 °C (98.7 °F) (Temporal)   Resp 16   Ht 1.778 m (5' 10\")   Wt 86 kg (189 lb 9.5 oz)   SpO2 95%  Temp (24hrs), Av.7 °C (98.1 °F), Min:36.3 °C (97.4 °F), Max:37.1 °C " (98.7 °F)      A/P   1. Vancomycin dose change: Continue 1200 mg IV q12h (@ 0100 & 1300)  2. Next vancomycin level: In 3-5 days  3. Goal trough: 12-16 mcg/mL  4. Comments: Pt has remained clinically stable. Vanco trough drawn ~11 hours from previous dose and is in therapeutic range. Will continue with current dose. Awaiting ID recs for duration of therapy and possible de-escalation    Alejandra Fonseca, PharmD, BCPS

## 2019-12-13 LAB
ALBUMIN SERPL BCP-MCNC: 3.2 G/DL (ref 3.2–4.9)
BUN SERPL-MCNC: 10 MG/DL (ref 8–22)
CALCIUM SERPL-MCNC: 9.3 MG/DL (ref 8.5–10.5)
CHLORIDE SERPL-SCNC: 110 MMOL/L (ref 96–112)
CO2 SERPL-SCNC: 25 MMOL/L (ref 20–33)
CREAT SERPL-MCNC: 0.69 MG/DL (ref 0.5–1.4)
ERYTHROCYTE [DISTWIDTH] IN BLOOD BY AUTOMATED COUNT: 54.6 FL (ref 35.9–50)
GLUCOSE SERPL-MCNC: 96 MG/DL (ref 65–99)
HCT VFR BLD AUTO: 33.6 % (ref 42–52)
HGB BLD-MCNC: 10.9 G/DL (ref 14–18)
MCH RBC QN AUTO: 30 PG (ref 27–33)
MCHC RBC AUTO-ENTMCNC: 32.4 G/DL (ref 33.7–35.3)
MCV RBC AUTO: 92.6 FL (ref 81.4–97.8)
PHOSPHATE SERPL-MCNC: 3.7 MG/DL (ref 2.5–4.5)
PLATELET # BLD AUTO: 200 K/UL (ref 164–446)
PMV BLD AUTO: 9.1 FL (ref 9–12.9)
POTASSIUM SERPL-SCNC: 3.7 MMOL/L (ref 3.6–5.5)
RBC # BLD AUTO: 3.63 M/UL (ref 4.7–6.1)
SODIUM SERPL-SCNC: 142 MMOL/L (ref 135–145)
WBC # BLD AUTO: 6.4 K/UL (ref 4.8–10.8)

## 2019-12-13 PROCEDURE — A9270 NON-COVERED ITEM OR SERVICE: HCPCS | Performed by: HOSPITALIST

## 2019-12-13 PROCEDURE — 85027 COMPLETE CBC AUTOMATED: CPT

## 2019-12-13 PROCEDURE — 700112 HCHG RX REV CODE 229: Performed by: HOSPITALIST

## 2019-12-13 PROCEDURE — 700102 HCHG RX REV CODE 250 W/ 637 OVERRIDE(OP): Performed by: HOSPITALIST

## 2019-12-13 PROCEDURE — A9270 NON-COVERED ITEM OR SERVICE: HCPCS | Performed by: INTERNAL MEDICINE

## 2019-12-13 PROCEDURE — 770020 HCHG ROOM/CARE - TELE (206)

## 2019-12-13 PROCEDURE — 36415 COLL VENOUS BLD VENIPUNCTURE: CPT

## 2019-12-13 PROCEDURE — 80069 RENAL FUNCTION PANEL: CPT

## 2019-12-13 PROCEDURE — 99232 SBSQ HOSP IP/OBS MODERATE 35: CPT | Performed by: HOSPITALIST

## 2019-12-13 PROCEDURE — 700102 HCHG RX REV CODE 250 W/ 637 OVERRIDE(OP): Performed by: INTERNAL MEDICINE

## 2019-12-13 RX ADMIN — FERROUS SULFATE TAB 325 MG (65 MG ELEMENTAL FE) 325 MG: 325 (65 FE) TAB at 16:43

## 2019-12-13 RX ADMIN — DOCUSATE SODIUM 100 MG: 100 CAPSULE, LIQUID FILLED ORAL at 16:43

## 2019-12-13 RX ADMIN — DOXYCYCLINE 100 MG: 100 TABLET, FILM COATED ORAL at 16:44

## 2019-12-13 RX ADMIN — AMOXICILLIN AND CLAVULANATE POTASSIUM 1 TABLET: 875; 125 TABLET, FILM COATED ORAL at 16:43

## 2019-12-13 RX ADMIN — BACLOFEN 10 MG: 10 TABLET ORAL at 20:47

## 2019-12-13 RX ADMIN — RIVAROXABAN 20 MG: 20 TABLET, FILM COATED ORAL at 16:43

## 2019-12-13 RX ADMIN — TRAZODONE HYDROCHLORIDE 50 MG: 50 TABLET ORAL at 22:00

## 2019-12-13 RX ADMIN — BACLOFEN 10 MG: 10 TABLET ORAL at 16:44

## 2019-12-13 ASSESSMENT — ENCOUNTER SYMPTOMS
ABDOMINAL PAIN: 0
FEVER: 0
HEADACHES: 0
COUGH: 0
NERVOUS/ANXIOUS: 1
SHORTNESS OF BREATH: 0
VOMITING: 0
FOCAL WEAKNESS: 1
NAUSEA: 0
BLOOD IN STOOL: 0

## 2019-12-13 NOTE — DISCHARGE PLANNING
RN CM called and left voicemail with Dionna at [facility] regarding patient's Medicare days and if they are truly out.

## 2019-12-13 NOTE — DISCHARGE PLANNING
RN CM called Jagruti at [facility] inquiring about if patient's Medicare days are confirmed to be out since patient under the impression they should be reset now. Jagruti to this RN CM know when she confirms.

## 2019-12-13 NOTE — DISCHARGE PLANNING
Anticipated Discharge Disposition: TBD    Action: RN CM met with patient at bedside. Patient updated that [facility] ([facility]), denied patient due to no Medicare days left. Patient reports that he called the financial office, unclear who that was, and they said his days should have reset as patient discharged from [facility] on Oct. 2, 2019 and was not admitted to hospital until 12/6 so it has been over 60 days and his Medicare days should be okay.  RN CM called and spoke with Dionna at [facility] who states that cannot speak to if his days are truly out and needs to call this RN MATHEUS back. RN CM updated patient.    Patient reports that home health at home was not working out well in the past so he would really prefer placement if able.    Barriers to Discharge: TBD     Plan: RN CM to f/u with [facility] regarding patient's referral

## 2019-12-14 PROBLEM — K59.00 CONSTIPATION: Status: ACTIVE | Noted: 2019-12-14

## 2019-12-14 LAB
ALBUMIN SERPL BCP-MCNC: 3.5 G/DL (ref 3.2–4.9)
BUN SERPL-MCNC: 12 MG/DL (ref 8–22)
CALCIUM SERPL-MCNC: 9.3 MG/DL (ref 8.5–10.5)
CHLORIDE SERPL-SCNC: 109 MMOL/L (ref 96–112)
CO2 SERPL-SCNC: 24 MMOL/L (ref 20–33)
CREAT SERPL-MCNC: 0.69 MG/DL (ref 0.5–1.4)
ERYTHROCYTE [DISTWIDTH] IN BLOOD BY AUTOMATED COUNT: 54.5 FL (ref 35.9–50)
GLUCOSE SERPL-MCNC: 95 MG/DL (ref 65–99)
HCT VFR BLD AUTO: 33.8 % (ref 42–52)
HGB BLD-MCNC: 10.6 G/DL (ref 14–18)
MCH RBC QN AUTO: 29.3 PG (ref 27–33)
MCHC RBC AUTO-ENTMCNC: 31.4 G/DL (ref 33.7–35.3)
MCV RBC AUTO: 93.4 FL (ref 81.4–97.8)
PHOSPHATE SERPL-MCNC: 3.4 MG/DL (ref 2.5–4.5)
PLATELET # BLD AUTO: 180 K/UL (ref 164–446)
PMV BLD AUTO: 8.6 FL (ref 9–12.9)
POTASSIUM SERPL-SCNC: 3.8 MMOL/L (ref 3.6–5.5)
RBC # BLD AUTO: 3.62 M/UL (ref 4.7–6.1)
SODIUM SERPL-SCNC: 141 MMOL/L (ref 135–145)
WBC # BLD AUTO: 7.6 K/UL (ref 4.8–10.8)

## 2019-12-14 PROCEDURE — 700102 HCHG RX REV CODE 250 W/ 637 OVERRIDE(OP): Performed by: HOSPITALIST

## 2019-12-14 PROCEDURE — A9270 NON-COVERED ITEM OR SERVICE: HCPCS | Performed by: INTERNAL MEDICINE

## 2019-12-14 PROCEDURE — A9270 NON-COVERED ITEM OR SERVICE: HCPCS | Performed by: HOSPITALIST

## 2019-12-14 PROCEDURE — 36415 COLL VENOUS BLD VENIPUNCTURE: CPT

## 2019-12-14 PROCEDURE — 700112 HCHG RX REV CODE 229: Performed by: HOSPITALIST

## 2019-12-14 PROCEDURE — 80069 RENAL FUNCTION PANEL: CPT

## 2019-12-14 PROCEDURE — 700102 HCHG RX REV CODE 250 W/ 637 OVERRIDE(OP): Performed by: INTERNAL MEDICINE

## 2019-12-14 PROCEDURE — 99232 SBSQ HOSP IP/OBS MODERATE 35: CPT | Performed by: HOSPITALIST

## 2019-12-14 PROCEDURE — 770020 HCHG ROOM/CARE - TELE (206)

## 2019-12-14 PROCEDURE — 85027 COMPLETE CBC AUTOMATED: CPT

## 2019-12-14 RX ORDER — POLYETHYLENE GLYCOL 3350 17 G/17G
1 POWDER, FOR SOLUTION ORAL 2 TIMES DAILY
Status: DISCONTINUED | OUTPATIENT
Start: 2019-12-14 | End: 2019-12-23 | Stop reason: HOSPADM

## 2019-12-14 RX ADMIN — TRAZODONE HYDROCHLORIDE 50 MG: 50 TABLET ORAL at 21:38

## 2019-12-14 RX ADMIN — DOXYCYCLINE 100 MG: 100 TABLET, FILM COATED ORAL at 16:44

## 2019-12-14 RX ADMIN — BACLOFEN 10 MG: 10 TABLET ORAL at 08:13

## 2019-12-14 RX ADMIN — DOCUSATE SODIUM 100 MG: 100 CAPSULE, LIQUID FILLED ORAL at 16:44

## 2019-12-14 RX ADMIN — BACLOFEN 10 MG: 10 TABLET ORAL at 21:38

## 2019-12-14 RX ADMIN — FINASTERIDE 5 MG: 5 TABLET, FILM COATED ORAL at 07:00

## 2019-12-14 RX ADMIN — POLYETHYLENE GLYCOL 3350 1 PACKET: 17 POWDER, FOR SOLUTION ORAL at 07:01

## 2019-12-14 RX ADMIN — DOXYCYCLINE 100 MG: 100 TABLET, FILM COATED ORAL at 07:00

## 2019-12-14 RX ADMIN — MULTIPLE VITAMINS W/ MINERALS TAB 1 TABLET: TAB at 07:00

## 2019-12-14 RX ADMIN — FERROUS SULFATE TAB 325 MG (65 MG ELEMENTAL FE) 325 MG: 325 (65 FE) TAB at 16:44

## 2019-12-14 RX ADMIN — FERROUS SULFATE TAB 325 MG (65 MG ELEMENTAL FE) 325 MG: 325 (65 FE) TAB at 07:00

## 2019-12-14 RX ADMIN — BACLOFEN 10 MG: 10 TABLET ORAL at 16:44

## 2019-12-14 RX ADMIN — POLYETHYLENE GLYCOL 3350 1 PACKET: 17 POWDER, FOR SOLUTION ORAL at 16:44

## 2019-12-14 RX ADMIN — DOCUSATE SODIUM 100 MG: 100 CAPSULE, LIQUID FILLED ORAL at 07:01

## 2019-12-14 RX ADMIN — LOSARTAN POTASSIUM 50 MG: 50 TABLET, FILM COATED ORAL at 07:01

## 2019-12-14 RX ADMIN — AMOXICILLIN AND CLAVULANATE POTASSIUM 1 TABLET: 875; 125 TABLET, FILM COATED ORAL at 07:01

## 2019-12-14 RX ADMIN — RIVAROXABAN 20 MG: 20 TABLET, FILM COATED ORAL at 16:44

## 2019-12-14 RX ADMIN — BACLOFEN 10 MG: 10 TABLET ORAL at 12:20

## 2019-12-14 RX ADMIN — Medication 1 CAPSULE: at 07:00

## 2019-12-14 RX ADMIN — AMOXICILLIN AND CLAVULANATE POTASSIUM 1 TABLET: 875; 125 TABLET, FILM COATED ORAL at 16:44

## 2019-12-14 RX ADMIN — SENNOSIDES 17.2 MG: 8.6 TABLET, FILM COATED ORAL at 06:00

## 2019-12-14 ASSESSMENT — ENCOUNTER SYMPTOMS
COUGH: 0
HEADACHES: 0
SHORTNESS OF BREATH: 0
FEVER: 0
NERVOUS/ANXIOUS: 1
ABDOMINAL PAIN: 0
FOCAL WEAKNESS: 1
VOMITING: 0
CONSTIPATION: 1
NAUSEA: 0
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
## 2019-12-14 NOTE — WOUND TEAM
"  [facility]     Inpatient Services     Established Ostomy Management/ troubleshooting      HPI: Reviewed  PMH: Reviewed   SH: Reviewed   Reason for Ostomy nurse consult:  F/U on colostomy appliance change    Subjective: \"I usually do it myself, but it's too difficult at this angle.\"    Objective: Dark green stool noted, appliance appears to be intact - no leakage noted.   Ostomy type: colostomy  Stoma location:  LUQ  Stoma assessment:    Appearance:  Pink, oval flush   Size: 1.5\"   Protrusion: Flush   Output: ~40 ml, some formed stool noted in bag    jxn: Intact   Peristomal skin: Intact    Ostomy Appliance (type and size):  2 piece 2 3/4\" w/ paste ring     Interventions and Education: Pt reports doing self care at home but declined to assist at this time. Previous appliance removed. Cleansed peristomal and stoma w/ warm wash cloth. Template created to fit and barrier cut according to template. Paste ring applied to back of barrier and applied to peristomal skin. Pouch applied to barrier and end of bag closed. Updated Lianne bedside RN that bedside nursing to provide ostomy care and to consult wound and ostomy department PRN.      Evaluation: Pt w/ established colostomy, pt reports he did provide self care prior to admit but declined to assist in changing at this time.     Plan: Bedside nursing to do ostomy care and consult wound and ostomy department PRN.         Anticipated discharge needs: Pt may dc to [facility] for wound care. Pt denied any needs at this time  "

## 2019-12-15 LAB
ALBUMIN SERPL BCP-MCNC: 3.5 G/DL (ref 3.2–4.9)
BUN SERPL-MCNC: 13 MG/DL (ref 8–22)
CALCIUM SERPL-MCNC: 9.3 MG/DL (ref 8.5–10.5)
CHLORIDE SERPL-SCNC: 111 MMOL/L (ref 96–112)
CO2 SERPL-SCNC: 26 MMOL/L (ref 20–33)
CREAT SERPL-MCNC: 0.64 MG/DL (ref 0.5–1.4)
ERYTHROCYTE [DISTWIDTH] IN BLOOD BY AUTOMATED COUNT: 56.9 FL (ref 35.9–50)
GLUCOSE SERPL-MCNC: 93 MG/DL (ref 65–99)
HCT VFR BLD AUTO: 35.4 % (ref 42–52)
HGB BLD-MCNC: 11.3 G/DL (ref 14–18)
MCH RBC QN AUTO: 30.3 PG (ref 27–33)
MCHC RBC AUTO-ENTMCNC: 31.9 G/DL (ref 33.7–35.3)
MCV RBC AUTO: 94.9 FL (ref 81.4–97.8)
PHOSPHATE SERPL-MCNC: 3.6 MG/DL (ref 2.5–4.5)
PLATELET # BLD AUTO: 178 K/UL (ref 164–446)
PMV BLD AUTO: 8.6 FL (ref 9–12.9)
POTASSIUM SERPL-SCNC: 4 MMOL/L (ref 3.6–5.5)
RBC # BLD AUTO: 3.73 M/UL (ref 4.7–6.1)
SODIUM SERPL-SCNC: 143 MMOL/L (ref 135–145)
WBC # BLD AUTO: 6.1 K/UL (ref 4.8–10.8)

## 2019-12-15 PROCEDURE — 700102 HCHG RX REV CODE 250 W/ 637 OVERRIDE(OP): Performed by: HOSPITALIST

## 2019-12-15 PROCEDURE — 700102 HCHG RX REV CODE 250 W/ 637 OVERRIDE(OP): Performed by: INTERNAL MEDICINE

## 2019-12-15 PROCEDURE — A9270 NON-COVERED ITEM OR SERVICE: HCPCS | Performed by: HOSPITALIST

## 2019-12-15 PROCEDURE — 770020 HCHG ROOM/CARE - TELE (206)

## 2019-12-15 PROCEDURE — 85027 COMPLETE CBC AUTOMATED: CPT

## 2019-12-15 PROCEDURE — A9270 NON-COVERED ITEM OR SERVICE: HCPCS | Performed by: INTERNAL MEDICINE

## 2019-12-15 PROCEDURE — 99232 SBSQ HOSP IP/OBS MODERATE 35: CPT | Performed by: HOSPITALIST

## 2019-12-15 PROCEDURE — 36415 COLL VENOUS BLD VENIPUNCTURE: CPT

## 2019-12-15 PROCEDURE — 80069 RENAL FUNCTION PANEL: CPT

## 2019-12-15 PROCEDURE — 700112 HCHG RX REV CODE 229: Performed by: HOSPITALIST

## 2019-12-15 RX ADMIN — TRAZODONE HYDROCHLORIDE 50 MG: 50 TABLET ORAL at 21:34

## 2019-12-15 RX ADMIN — BACLOFEN 10 MG: 10 TABLET ORAL at 11:58

## 2019-12-15 RX ADMIN — DOXYCYCLINE 100 MG: 100 TABLET, FILM COATED ORAL at 16:56

## 2019-12-15 RX ADMIN — FERROUS SULFATE TAB 325 MG (65 MG ELEMENTAL FE) 325 MG: 325 (65 FE) TAB at 06:47

## 2019-12-15 RX ADMIN — DOXYCYCLINE 100 MG: 100 TABLET, FILM COATED ORAL at 06:46

## 2019-12-15 RX ADMIN — BACLOFEN 10 MG: 10 TABLET ORAL at 16:56

## 2019-12-15 RX ADMIN — SENNOSIDES 17.2 MG: 8.6 TABLET, FILM COATED ORAL at 06:46

## 2019-12-15 RX ADMIN — DOCUSATE SODIUM 100 MG: 100 CAPSULE, LIQUID FILLED ORAL at 06:46

## 2019-12-15 RX ADMIN — FINASTERIDE 5 MG: 5 TABLET, FILM COATED ORAL at 06:47

## 2019-12-15 RX ADMIN — BACLOFEN 10 MG: 10 TABLET ORAL at 21:34

## 2019-12-15 RX ADMIN — POLYETHYLENE GLYCOL 3350 1 PACKET: 17 POWDER, FOR SOLUTION ORAL at 16:55

## 2019-12-15 RX ADMIN — RIVAROXABAN 20 MG: 20 TABLET, FILM COATED ORAL at 16:56

## 2019-12-15 RX ADMIN — Medication 1 CAPSULE: at 06:47

## 2019-12-15 RX ADMIN — AMOXICILLIN AND CLAVULANATE POTASSIUM 1 TABLET: 875; 125 TABLET, FILM COATED ORAL at 06:46

## 2019-12-15 RX ADMIN — DOCUSATE SODIUM 100 MG: 100 CAPSULE, LIQUID FILLED ORAL at 16:56

## 2019-12-15 RX ADMIN — POLYETHYLENE GLYCOL 3350 1 PACKET: 17 POWDER, FOR SOLUTION ORAL at 06:46

## 2019-12-15 RX ADMIN — BACLOFEN 10 MG: 10 TABLET ORAL at 09:23

## 2019-12-15 RX ADMIN — MULTIPLE VITAMINS W/ MINERALS TAB 1 TABLET: TAB at 06:47

## 2019-12-15 RX ADMIN — AMOXICILLIN AND CLAVULANATE POTASSIUM 1 TABLET: 875; 125 TABLET, FILM COATED ORAL at 16:56

## 2019-12-15 RX ADMIN — LOSARTAN POTASSIUM 50 MG: 50 TABLET, FILM COATED ORAL at 06:47

## 2019-12-15 RX ADMIN — FERROUS SULFATE TAB 325 MG (65 MG ELEMENTAL FE) 325 MG: 325 (65 FE) TAB at 16:56

## 2019-12-15 ASSESSMENT — ENCOUNTER SYMPTOMS
FOCAL WEAKNESS: 1
SHORTNESS OF BREATH: 0
NAUSEA: 0
NERVOUS/ANXIOUS: 1
VOMITING: 0
DIARRHEA: 0
HEADACHES: 0
CONSTIPATION: 1
COUGH: 0
FEVER: 0

## 2019-12-15 NOTE — CARE PLAN
Problem: Communication  Goal: The ability to communicate needs accurately and effectively will improve  Outcome: PROGRESSING AS EXPECTED  Note:   Pt communicates needs effectively and calls for assistance appropriately.     Problem: Safety  Goal: Will remain free from falls  Outcome: PROGRESSING AS EXPECTED  Note:   Pt verbalizes understanding of fall precautions and calls for assistance appropriately. Bed in lowest position and locked. Bed alarm in use. Pt wearing non-slip socks, call light within reach.
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
## 2019-12-16 ENCOUNTER — TELEPHONE (OUTPATIENT)
Dept: CARDIOLOGY | Facility: MEDICAL CENTER | Age: 69
End: 2019-12-16

## 2019-12-16 LAB
ALBUMIN SERPL BCP-MCNC: 3.6 G/DL (ref 3.2–4.9)
BUN SERPL-MCNC: 14 MG/DL (ref 8–22)
CALCIUM SERPL-MCNC: 9.7 MG/DL (ref 8.5–10.5)
CHLORIDE SERPL-SCNC: 109 MMOL/L (ref 96–112)
CO2 SERPL-SCNC: 26 MMOL/L (ref 20–33)
CREAT SERPL-MCNC: 0.77 MG/DL (ref 0.5–1.4)
ERYTHROCYTE [DISTWIDTH] IN BLOOD BY AUTOMATED COUNT: 55.5 FL (ref 35.9–50)
GLUCOSE SERPL-MCNC: 97 MG/DL (ref 65–99)
HCT VFR BLD AUTO: 36.2 % (ref 42–52)
HGB BLD-MCNC: 11.4 G/DL (ref 14–18)
MCH RBC QN AUTO: 29.4 PG (ref 27–33)
MCHC RBC AUTO-ENTMCNC: 31.5 G/DL (ref 33.7–35.3)
MCV RBC AUTO: 93.3 FL (ref 81.4–97.8)
PHOSPHATE SERPL-MCNC: 3.9 MG/DL (ref 2.5–4.5)
PLATELET # BLD AUTO: 190 K/UL (ref 164–446)
PMV BLD AUTO: 8.8 FL (ref 9–12.9)
POTASSIUM SERPL-SCNC: 3.9 MMOL/L (ref 3.6–5.5)
RBC # BLD AUTO: 3.88 M/UL (ref 4.7–6.1)
SODIUM SERPL-SCNC: 142 MMOL/L (ref 135–145)
WBC # BLD AUTO: 5.7 K/UL (ref 4.8–10.8)

## 2019-12-16 PROCEDURE — 80069 RENAL FUNCTION PANEL: CPT

## 2019-12-16 PROCEDURE — A9270 NON-COVERED ITEM OR SERVICE: HCPCS | Performed by: INTERNAL MEDICINE

## 2019-12-16 PROCEDURE — 700102 HCHG RX REV CODE 250 W/ 637 OVERRIDE(OP): Performed by: INTERNAL MEDICINE

## 2019-12-16 PROCEDURE — 99232 SBSQ HOSP IP/OBS MODERATE 35: CPT | Performed by: HOSPITALIST

## 2019-12-16 PROCEDURE — 770001 HCHG ROOM/CARE - MED/SURG/GYN PRIV*

## 2019-12-16 PROCEDURE — A9270 NON-COVERED ITEM OR SERVICE: HCPCS | Performed by: HOSPITALIST

## 2019-12-16 PROCEDURE — 36415 COLL VENOUS BLD VENIPUNCTURE: CPT

## 2019-12-16 PROCEDURE — 700112 HCHG RX REV CODE 229: Performed by: HOSPITALIST

## 2019-12-16 PROCEDURE — 700102 HCHG RX REV CODE 250 W/ 637 OVERRIDE(OP): Performed by: HOSPITALIST

## 2019-12-16 PROCEDURE — 85027 COMPLETE CBC AUTOMATED: CPT

## 2019-12-16 RX ADMIN — LOSARTAN POTASSIUM 50 MG: 50 TABLET, FILM COATED ORAL at 05:05

## 2019-12-16 RX ADMIN — TRAZODONE HYDROCHLORIDE 50 MG: 50 TABLET ORAL at 21:34

## 2019-12-16 RX ADMIN — FERROUS SULFATE TAB 325 MG (65 MG ELEMENTAL FE) 325 MG: 325 (65 FE) TAB at 05:05

## 2019-12-16 RX ADMIN — DOXYCYCLINE 100 MG: 100 TABLET, FILM COATED ORAL at 18:30

## 2019-12-16 RX ADMIN — BACLOFEN 10 MG: 10 TABLET ORAL at 09:48

## 2019-12-16 RX ADMIN — POLYETHYLENE GLYCOL 3350 1 PACKET: 17 POWDER, FOR SOLUTION ORAL at 05:08

## 2019-12-16 RX ADMIN — DOCUSATE SODIUM 100 MG: 100 CAPSULE, LIQUID FILLED ORAL at 18:31

## 2019-12-16 RX ADMIN — BACLOFEN 10 MG: 10 TABLET ORAL at 18:30

## 2019-12-16 RX ADMIN — BACLOFEN 10 MG: 10 TABLET ORAL at 21:34

## 2019-12-16 RX ADMIN — Medication 1 CAPSULE: at 05:04

## 2019-12-16 RX ADMIN — AMOXICILLIN AND CLAVULANATE POTASSIUM 1 TABLET: 875; 125 TABLET, FILM COATED ORAL at 18:31

## 2019-12-16 RX ADMIN — POLYETHYLENE GLYCOL 3350 1 PACKET: 17 POWDER, FOR SOLUTION ORAL at 18:32

## 2019-12-16 RX ADMIN — FINASTERIDE 5 MG: 5 TABLET, FILM COATED ORAL at 05:04

## 2019-12-16 RX ADMIN — DOCUSATE SODIUM 100 MG: 100 CAPSULE, LIQUID FILLED ORAL at 05:05

## 2019-12-16 RX ADMIN — DOXYCYCLINE 100 MG: 100 TABLET, FILM COATED ORAL at 05:04

## 2019-12-16 RX ADMIN — RIVAROXABAN 20 MG: 20 TABLET, FILM COATED ORAL at 18:31

## 2019-12-16 RX ADMIN — FERROUS SULFATE TAB 325 MG (65 MG ELEMENTAL FE) 325 MG: 325 (65 FE) TAB at 18:30

## 2019-12-16 RX ADMIN — MULTIPLE VITAMINS W/ MINERALS TAB 1 TABLET: TAB at 05:05

## 2019-12-16 RX ADMIN — AMOXICILLIN AND CLAVULANATE POTASSIUM 1 TABLET: 875; 125 TABLET, FILM COATED ORAL at 05:04

## 2019-12-16 RX ADMIN — SENNOSIDES 17.2 MG: 8.6 TABLET, FILM COATED ORAL at 05:04

## 2019-12-16 RX ADMIN — BACLOFEN 10 MG: 10 TABLET ORAL at 15:35

## 2019-12-16 ASSESSMENT — ENCOUNTER SYMPTOMS
VOMITING: 0
DIARRHEA: 0
SHORTNESS OF BREATH: 0
NERVOUS/ANXIOUS: 1
CONSTIPATION: 0
CHILLS: 0
COUGH: 0
FOCAL WEAKNESS: 1
FEVER: 0
HEADACHES: 0
NAUSEA: 0

## 2019-12-16 NOTE — DISCHARGE PLANNING
RN MATHEUS spoke with Savanna at [facility] and they are unable to accept patient due to Medicare days left. MD notified and wants to try SNF.

## 2019-12-16 NOTE — PROGRESS NOTES
Assumed care of patient at bedside report from NOC RN. Updated on POC. Patient currently A & O x 4; on RA; Parapalegic; without complaints of acute pain. Call light within reach. Whiteboard updated. Fall precautions in place. Bed locked and in lowest position. All questions answered. No other needs indicated at this time.

## 2019-12-16 NOTE — TELEPHONE ENCOUNTER
Dr. Merino,    Are there any medications you would like this patient to hold for this procedure?    Thank You,  Alexandria

## 2019-12-16 NOTE — DISCHARGE PLANNING
RN MATHEUS updated patient on denial by [facility]. He states that he does not understand why they are declining him. He is out of SNF days but should be good with LTAC. Patient given phone number for [facility] to speak with someone as he does not feel his wound would get proper care at home with home health.

## 2019-12-16 NOTE — TELEPHONE ENCOUNTER
No meds to hold. Continue uninterrupted Xarelto and schedule >4 weeks out so that we won't need RICHARD.    Thanks  MADDIE

## 2019-12-16 NOTE — TELEPHONE ENCOUNTER
----- Message from KATE Dominguez sent at 12/12/2019  3:39 PM PST -----  Regarding: schedule request: AFL RFA  Dear Alexandria,     Per Dr. Merion, would you please schedule patient for AFL ablation as outpatient. Not on rate or rhythm control medications.  On xarelto. Pt being d/c'd home today.  Thanks,     KATE Dominguez   [facility]  [phone]

## 2019-12-16 NOTE — DISCHARGE PLANNING
Anticipated Discharge Disposition: TBD    Action: RN CM received call from Lorena with [facility]. Even if patient had Medicare days, he does not qualify for LTAC as he does not have wound vac or IV abx at this time.    Barriers to Discharge: DC plan    Plan: RN CM to f/u with patient and treatment team for discharge plan

## 2019-12-16 NOTE — CARE PLAN
Problem: Communication  Goal: The ability to communicate needs accurately and effectively will improve  Outcome: PROGRESSING AS EXPECTED  Note:   Pt communicates needs effectively and calls for assistance appropriately.     Problem: Knowledge Deficit  Goal: Knowledge of the prescribed therapeutic regimen will improve  Outcome: PROGRESSING AS EXPECTED  Note:   Pt verbalizes understanding of prescribed medications and has no questions at this time.

## 2019-12-17 ENCOUNTER — PATIENT OUTREACH (OUTPATIENT)
Dept: HEALTH INFORMATION MANAGEMENT | Facility: OTHER | Age: 69
End: 2019-12-17

## 2019-12-17 LAB
ALBUMIN SERPL BCP-MCNC: 3.4 G/DL (ref 3.2–4.9)
BUN SERPL-MCNC: 14 MG/DL (ref 8–22)
CALCIUM SERPL-MCNC: 9.1 MG/DL (ref 8.5–10.5)
CHLORIDE SERPL-SCNC: 109 MMOL/L (ref 96–112)
CO2 SERPL-SCNC: 26 MMOL/L (ref 20–33)
CREAT SERPL-MCNC: 0.73 MG/DL (ref 0.5–1.4)
EKG IMPRESSION: NORMAL
ERYTHROCYTE [DISTWIDTH] IN BLOOD BY AUTOMATED COUNT: 54.9 FL (ref 35.9–50)
GLUCOSE SERPL-MCNC: 90 MG/DL (ref 65–99)
HCT VFR BLD AUTO: 35.5 % (ref 42–52)
HGB BLD-MCNC: 11.5 G/DL (ref 14–18)
MCH RBC QN AUTO: 30.3 PG (ref 27–33)
MCHC RBC AUTO-ENTMCNC: 32.4 G/DL (ref 33.7–35.3)
MCV RBC AUTO: 93.4 FL (ref 81.4–97.8)
PHOSPHATE SERPL-MCNC: 4 MG/DL (ref 2.5–4.5)
PLATELET # BLD AUTO: 198 K/UL (ref 164–446)
PMV BLD AUTO: 8.6 FL (ref 9–12.9)
POTASSIUM SERPL-SCNC: 4.1 MMOL/L (ref 3.6–5.5)
RBC # BLD AUTO: 3.8 M/UL (ref 4.7–6.1)
SODIUM SERPL-SCNC: 143 MMOL/L (ref 135–145)
WBC # BLD AUTO: 7 K/UL (ref 4.8–10.8)

## 2019-12-17 PROCEDURE — 93010 ELECTROCARDIOGRAM REPORT: CPT | Performed by: INTERNAL MEDICINE

## 2019-12-17 PROCEDURE — 700112 HCHG RX REV CODE 229: Performed by: HOSPITALIST

## 2019-12-17 PROCEDURE — 700102 HCHG RX REV CODE 250 W/ 637 OVERRIDE(OP): Performed by: HOSPITALIST

## 2019-12-17 PROCEDURE — A9270 NON-COVERED ITEM OR SERVICE: HCPCS | Performed by: HOSPITALIST

## 2019-12-17 PROCEDURE — 700102 HCHG RX REV CODE 250 W/ 637 OVERRIDE(OP): Performed by: INTERNAL MEDICINE

## 2019-12-17 PROCEDURE — 85027 COMPLETE CBC AUTOMATED: CPT

## 2019-12-17 PROCEDURE — 36415 COLL VENOUS BLD VENIPUNCTURE: CPT

## 2019-12-17 PROCEDURE — A9270 NON-COVERED ITEM OR SERVICE: HCPCS | Performed by: INTERNAL MEDICINE

## 2019-12-17 PROCEDURE — 770001 HCHG ROOM/CARE - MED/SURG/GYN PRIV*

## 2019-12-17 PROCEDURE — 99232 SBSQ HOSP IP/OBS MODERATE 35: CPT | Performed by: FAMILY MEDICINE

## 2019-12-17 PROCEDURE — 93005 ELECTROCARDIOGRAM TRACING: CPT | Performed by: FAMILY MEDICINE

## 2019-12-17 PROCEDURE — 80069 RENAL FUNCTION PANEL: CPT

## 2019-12-17 RX ADMIN — POLYETHYLENE GLYCOL 3350 1 PACKET: 17 POWDER, FOR SOLUTION ORAL at 17:37

## 2019-12-17 RX ADMIN — AMOXICILLIN AND CLAVULANATE POTASSIUM 1 TABLET: 875; 125 TABLET, FILM COATED ORAL at 17:36

## 2019-12-17 RX ADMIN — DOXYCYCLINE 100 MG: 100 TABLET, FILM COATED ORAL at 17:36

## 2019-12-17 RX ADMIN — FINASTERIDE 5 MG: 5 TABLET, FILM COATED ORAL at 05:14

## 2019-12-17 RX ADMIN — DOCUSATE SODIUM 100 MG: 100 CAPSULE, LIQUID FILLED ORAL at 17:36

## 2019-12-17 RX ADMIN — BACLOFEN 10 MG: 10 TABLET ORAL at 17:36

## 2019-12-17 RX ADMIN — BACLOFEN 10 MG: 10 TABLET ORAL at 21:16

## 2019-12-17 RX ADMIN — SENNOSIDES 17.2 MG: 8.6 TABLET, FILM COATED ORAL at 05:14

## 2019-12-17 RX ADMIN — AMOXICILLIN AND CLAVULANATE POTASSIUM 1 TABLET: 875; 125 TABLET, FILM COATED ORAL at 05:14

## 2019-12-17 RX ADMIN — FERROUS SULFATE TAB 325 MG (65 MG ELEMENTAL FE) 325 MG: 325 (65 FE) TAB at 05:13

## 2019-12-17 RX ADMIN — FERROUS SULFATE TAB 325 MG (65 MG ELEMENTAL FE) 325 MG: 325 (65 FE) TAB at 17:36

## 2019-12-17 RX ADMIN — DOXYCYCLINE 100 MG: 100 TABLET, FILM COATED ORAL at 05:14

## 2019-12-17 RX ADMIN — BACLOFEN 10 MG: 10 TABLET ORAL at 15:19

## 2019-12-17 RX ADMIN — DOCUSATE SODIUM 100 MG: 100 CAPSULE, LIQUID FILLED ORAL at 05:14

## 2019-12-17 RX ADMIN — RIVAROXABAN 20 MG: 20 TABLET, FILM COATED ORAL at 18:00

## 2019-12-17 RX ADMIN — Medication 1 CAPSULE: at 05:14

## 2019-12-17 RX ADMIN — POLYETHYLENE GLYCOL 3350 1 PACKET: 17 POWDER, FOR SOLUTION ORAL at 05:13

## 2019-12-17 RX ADMIN — MULTIPLE VITAMINS W/ MINERALS TAB 1 TABLET: TAB at 05:14

## 2019-12-17 RX ADMIN — LOSARTAN POTASSIUM 50 MG: 50 TABLET, FILM COATED ORAL at 05:13

## 2019-12-17 RX ADMIN — BACLOFEN 10 MG: 10 TABLET ORAL at 09:32

## 2019-12-17 ASSESSMENT — ENCOUNTER SYMPTOMS
HEARTBURN: 0
TREMORS: 0
COUGH: 0
ORTHOPNEA: 0
DIAPHORESIS: 0
BACK PAIN: 0
DOUBLE VISION: 0
BLURRED VISION: 0
FEVER: 0
SPUTUM PRODUCTION: 0
PALPITATIONS: 0
VOMITING: 0
TINGLING: 0
HEMOPTYSIS: 0
PHOTOPHOBIA: 0
HEADACHES: 0
NECK PAIN: 0
NAUSEA: 0
CHILLS: 0
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
## 2019-12-18 LAB
ALBUMIN SERPL BCP-MCNC: 3.4 G/DL (ref 3.2–4.9)
ALBUMIN/GLOB SERPL: 1 G/DL
ALP SERPL-CCNC: 65 U/L (ref 30–99)
ALT SERPL-CCNC: 9 U/L (ref 2–50)
ANION GAP SERPL CALC-SCNC: 6 MMOL/L (ref 0–11.9)
AST SERPL-CCNC: 10 U/L (ref 12–45)
BILIRUB SERPL-MCNC: 0.4 MG/DL (ref 0.1–1.5)
BUN SERPL-MCNC: 16 MG/DL (ref 8–22)
CALCIUM SERPL-MCNC: 9.1 MG/DL (ref 8.5–10.5)
CHLORIDE SERPL-SCNC: 108 MMOL/L (ref 96–112)
CO2 SERPL-SCNC: 26 MMOL/L (ref 20–33)
CREAT SERPL-MCNC: 0.78 MG/DL (ref 0.5–1.4)
ERYTHROCYTE [DISTWIDTH] IN BLOOD BY AUTOMATED COUNT: 54.9 FL (ref 35.9–50)
GLOBULIN SER CALC-MCNC: 3.5 G/DL (ref 1.9–3.5)
GLUCOSE SERPL-MCNC: 100 MG/DL (ref 65–99)
HCT VFR BLD AUTO: 35.6 % (ref 42–52)
HGB BLD-MCNC: 11.5 G/DL (ref 14–18)
MCH RBC QN AUTO: 30 PG (ref 27–33)
MCHC RBC AUTO-ENTMCNC: 32.3 G/DL (ref 33.7–35.3)
MCV RBC AUTO: 93 FL (ref 81.4–97.8)
PHOSPHATE SERPL-MCNC: 3.3 MG/DL (ref 2.5–4.5)
PLATELET # BLD AUTO: 206 K/UL (ref 164–446)
PMV BLD AUTO: 8.6 FL (ref 9–12.9)
POTASSIUM SERPL-SCNC: 4.3 MMOL/L (ref 3.6–5.5)
PROT SERPL-MCNC: 6.9 G/DL (ref 6–8.2)
RBC # BLD AUTO: 3.83 M/UL (ref 4.7–6.1)
SODIUM SERPL-SCNC: 140 MMOL/L (ref 135–145)
WBC # BLD AUTO: 7.8 K/UL (ref 4.8–10.8)

## 2019-12-18 PROCEDURE — 97606 NEG PRS WND THER DME>50 SQCM: CPT

## 2019-12-18 PROCEDURE — 11043 DBRDMT MUSC&/FSCA 1ST 20/<: CPT | Performed by: NURSE PRACTITIONER

## 2019-12-18 PROCEDURE — 770006 HCHG ROOM/CARE - MED/SURG/GYN SEMI*

## 2019-12-18 PROCEDURE — 700112 HCHG RX REV CODE 229: Performed by: HOSPITALIST

## 2019-12-18 PROCEDURE — A9270 NON-COVERED ITEM OR SERVICE: HCPCS | Performed by: INTERNAL MEDICINE

## 2019-12-18 PROCEDURE — A9270 NON-COVERED ITEM OR SERVICE: HCPCS | Performed by: HOSPITALIST

## 2019-12-18 PROCEDURE — 84100 ASSAY OF PHOSPHORUS: CPT

## 2019-12-18 PROCEDURE — 302098 PASTE RING (FLAT): Performed by: INTERNAL MEDICINE

## 2019-12-18 PROCEDURE — 306263 VAC CANNISTER W/GEL 500ML: Performed by: INTERNAL MEDICINE

## 2019-12-18 PROCEDURE — 700102 HCHG RX REV CODE 250 W/ 637 OVERRIDE(OP): Performed by: INTERNAL MEDICINE

## 2019-12-18 PROCEDURE — 700102 HCHG RX REV CODE 250 W/ 637 OVERRIDE(OP): Performed by: HOSPITALIST

## 2019-12-18 PROCEDURE — 85027 COMPLETE CBC AUTOMATED: CPT

## 2019-12-18 PROCEDURE — 0KBP0ZZ EXCISION OF LEFT HIP MUSCLE, OPEN APPROACH: ICD-10-PCS | Performed by: NURSE PRACTITIONER

## 2019-12-18 PROCEDURE — 80053 COMPREHEN METABOLIC PANEL: CPT

## 2019-12-18 PROCEDURE — 36415 COLL VENOUS BLD VENIPUNCTURE: CPT

## 2019-12-18 PROCEDURE — 700105 HCHG RX REV CODE 258

## 2019-12-18 PROCEDURE — A6213 FOAM DRG >16<=48 SQ IN W/BDR: HCPCS | Performed by: INTERNAL MEDICINE

## 2019-12-18 PROCEDURE — 99232 SBSQ HOSP IP/OBS MODERATE 35: CPT | Performed by: INTERNAL MEDICINE

## 2019-12-18 PROCEDURE — 0KBN0ZZ EXCISION OF RIGHT HIP MUSCLE, OPEN APPROACH: ICD-10-PCS | Performed by: NURSE PRACTITIONER

## 2019-12-18 RX ORDER — LIDOCAINE HYDROCHLORIDE AND EPINEPHRINE BITARTRATE 20; .01 MG/ML; MG/ML
10 INJECTION, SOLUTION SUBCUTANEOUS
Status: DISCONTINUED | OUTPATIENT
Start: 2019-12-18 | End: 2019-12-23 | Stop reason: HOSPADM

## 2019-12-18 RX ORDER — SODIUM CHLORIDE 9 MG/ML
INJECTION, SOLUTION INTRAVENOUS
Status: COMPLETED
Start: 2019-12-18 | End: 2019-12-18

## 2019-12-18 RX ADMIN — SENNOSIDES 17.2 MG: 8.6 TABLET, FILM COATED ORAL at 05:20

## 2019-12-18 RX ADMIN — BACLOFEN 10 MG: 10 TABLET ORAL at 21:14

## 2019-12-18 RX ADMIN — BACLOFEN 10 MG: 10 TABLET ORAL at 09:54

## 2019-12-18 RX ADMIN — POLYETHYLENE GLYCOL 3350 1 PACKET: 17 POWDER, FOR SOLUTION ORAL at 17:45

## 2019-12-18 RX ADMIN — LOSARTAN POTASSIUM 50 MG: 50 TABLET, FILM COATED ORAL at 05:20

## 2019-12-18 RX ADMIN — BACLOFEN 10 MG: 10 TABLET ORAL at 14:20

## 2019-12-18 RX ADMIN — DOXYCYCLINE 100 MG: 100 TABLET, FILM COATED ORAL at 17:45

## 2019-12-18 RX ADMIN — BACLOFEN 10 MG: 10 TABLET ORAL at 17:45

## 2019-12-18 RX ADMIN — Medication 1 CAPSULE: at 09:54

## 2019-12-18 RX ADMIN — DOXYCYCLINE 100 MG: 100 TABLET, FILM COATED ORAL at 05:20

## 2019-12-18 RX ADMIN — DOCUSATE SODIUM 100 MG: 100 CAPSULE, LIQUID FILLED ORAL at 17:44

## 2019-12-18 RX ADMIN — MULTIPLE VITAMINS W/ MINERALS TAB 1 TABLET: TAB at 05:20

## 2019-12-18 RX ADMIN — POLYETHYLENE GLYCOL 3350 1 PACKET: 17 POWDER, FOR SOLUTION ORAL at 05:21

## 2019-12-18 RX ADMIN — AMOXICILLIN AND CLAVULANATE POTASSIUM 1 TABLET: 875; 125 TABLET, FILM COATED ORAL at 17:45

## 2019-12-18 RX ADMIN — DOCUSATE SODIUM 100 MG: 100 CAPSULE, LIQUID FILLED ORAL at 05:20

## 2019-12-18 RX ADMIN — SODIUM CHLORIDE 500 ML: 9 INJECTION, SOLUTION INTRAVENOUS at 11:07

## 2019-12-18 RX ADMIN — AMOXICILLIN AND CLAVULANATE POTASSIUM 1 TABLET: 875; 125 TABLET, FILM COATED ORAL at 05:20

## 2019-12-18 RX ADMIN — FERROUS SULFATE TAB 325 MG (65 MG ELEMENTAL FE) 325 MG: 325 (65 FE) TAB at 17:45

## 2019-12-18 RX ADMIN — FERROUS SULFATE TAB 325 MG (65 MG ELEMENTAL FE) 325 MG: 325 (65 FE) TAB at 05:20

## 2019-12-18 RX ADMIN — FINASTERIDE 5 MG: 5 TABLET, FILM COATED ORAL at 05:20

## 2019-12-18 RX ADMIN — RIVAROXABAN 20 MG: 20 TABLET, FILM COATED ORAL at 17:46

## 2019-12-18 ASSESSMENT — ENCOUNTER SYMPTOMS
CHILLS: 0
HEADACHES: 0
TINGLING: 0
VOMITING: 0
ORTHOPNEA: 0
SPUTUM PRODUCTION: 0
DIAPHORESIS: 0
FEVER: 0
BACK PAIN: 0
NECK PAIN: 0
BLURRED VISION: 0
NAUSEA: 0
HEMOPTYSIS: 0
TREMORS: 0
HEARTBURN: 0
PHOTOPHOBIA: 0
COUGH: 0
PALPITATIONS: 0
DOUBLE VISION: 0

## 2019-12-18 NOTE — DISCHARGE PLANNING
Anticipated Discharge Disposition: TBD    Action: Pt rec’d as a transfer from another unit. Per CM notes, [facility] denied pt because he is out of medicare days and does not have a wound vac or IV ABX. Per PRITESH Vázquez, pt now has a wound vac. Per financial counselor [place], pt’s Medicare days did reset when he was out of the hospital for > 60 days. Reviewed with pt over the phone that he has 57 hospital days, 30 CO-hospital days, 20 SNF, 80 CO-SNF days, and 31 lifetime reserve days.     Reviewed with Lorena from [facility] that pt now has wound care needs and a wound vac. She will re-eval to see if pt meets their criteria.     Reviewed with pt that he lives at [facility] and wound not be able to dc to home with home health. States he has to be off of his wound. Will have to re-look at dc to skilled. Pt [facility] at [facility] and chooses [facility] as his 1st choice if he can dc to there, and then [facility] as his 2nd choice.     Barriers to Discharge: dc disposition undetermined, wound vac. Cannot go home with home health.     Plan: await callback from Lorena from [facility]. RN CM to f/u with patient and treatment team for discharge plan.

## 2019-12-18 NOTE — PROGRESS NOTES
2 RN skin check complete.   Devices in place wound vac, urinary catheter.  Skin assessed under devices Yes.  Confirmed pressure ulcers found on coccyx, left 3rd and 4th toes, left calf.  New potential pressure ulcers noted on left heel, left buttock, and right 1st toe medial side. Wound consult placed Yes.  The following interventions in place heel float boots, every 2 hour turns, low air loss mattress, wound vac, meplilex to heels and hips, gauze between right 1st and 2nd toe to separate toes.

## 2019-12-18 NOTE — PROGRESS NOTES
Received report from Telemetry RN. Assumed care of patient. Pt assessed and stable. VSS. Patient reports 0/10 pain at this time.  Discussed plan of care for day with patient and received verbal understanding. Call light within reach, bed in low position.  Educated pt re fall precautions and received verbal understanding.  However, pt continues to refuse bed alarms.  Pt is alert, oriented, and calls appropriately.  Patient paralyzed from nipple line down.  Does not try to get out of bed.
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
## 2019-12-18 NOTE — DISCHARGE PLANNING
Received Choice form at 1500  Agency/Facility Name: [facility]  Referral sent per Choice form @ 1500

## 2019-12-18 NOTE — CARE PLAN
Problem: Safety  Goal: Will remain free from falls  Outcome: PROGRESSING AS EXPECTED  Note:   Bed alarmed, call bell in reach. Rings appropriately.      Problem: Skin Integrity  Goal: Risk for impaired skin integrity will decrease  12/18/2019 0009 by Carolyn Ross R.N.  Note:   Drain for sacral decubitus draining green, brown drainaige 10 cc . Padding around stop cock to prevent injury. Repositioned q 2 hrs to off load. On air mattress .   12/18/2019 0009 by Carolyn Ross R.N.  Outcome: PROGRESSING AS EXPECTED

## 2019-12-18 NOTE — PROGRESS NOTES
Assumed care at 1900, bedside report received from Joy MENESES. Pt. Is medical and not on the monitor. Initial assessment completed, orders reviewed, call light within reach, bed alarm  in use, and hourly rounding in place. POC addressed with patient, no additional questions at this time.
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
## 2019-12-18 NOTE — CARE PLAN
Problem: Knowledge Deficit  Goal: Knowledge of disease process/condition, treatment plan, diagnostic tests, and medications will improve  Outcome: PROGRESSING AS EXPECTED  Intervention: Explain information regarding disease process/condition, treatment plan, diagnostic tests, and medications and document in education  Note:   Pt educated regarding plan of care and medications. All questions answered.

## 2019-12-19 ENCOUNTER — APPOINTMENT (OUTPATIENT)
Dept: RADIOLOGY | Facility: MEDICAL CENTER | Age: 69
DRG: 477 | End: 2019-12-19
Attending: INTERNAL MEDICINE
Payer: MEDICARE

## 2019-12-19 LAB
ALBUMIN SERPL BCP-MCNC: 3.5 G/DL (ref 3.2–4.9)
BUN SERPL-MCNC: 17 MG/DL (ref 8–22)
CALCIUM SERPL-MCNC: 9.1 MG/DL (ref 8.5–10.5)
CHLORIDE SERPL-SCNC: 109 MMOL/L (ref 96–112)
CO2 SERPL-SCNC: 25 MMOL/L (ref 20–33)
CREAT SERPL-MCNC: 0.64 MG/DL (ref 0.5–1.4)
ERYTHROCYTE [DISTWIDTH] IN BLOOD BY AUTOMATED COUNT: 55.1 FL (ref 35.9–50)
GLUCOSE SERPL-MCNC: 97 MG/DL (ref 65–99)
GRAM STN SPEC: NORMAL
HCT VFR BLD AUTO: 34.8 % (ref 42–52)
HGB BLD-MCNC: 11.2 G/DL (ref 14–18)
MCH RBC QN AUTO: 29.9 PG (ref 27–33)
MCHC RBC AUTO-ENTMCNC: 32.2 G/DL (ref 33.7–35.3)
MCV RBC AUTO: 93 FL (ref 81.4–97.8)
PHOSPHATE SERPL-MCNC: 3.1 MG/DL (ref 2.5–4.5)
PLATELET # BLD AUTO: 215 K/UL (ref 164–446)
PMV BLD AUTO: 8.8 FL (ref 9–12.9)
POTASSIUM SERPL-SCNC: 4.1 MMOL/L (ref 3.6–5.5)
RBC # BLD AUTO: 3.74 M/UL (ref 4.7–6.1)
SIGNIFICANT IND 70042: NORMAL
SITE SITE: NORMAL
SODIUM SERPL-SCNC: 141 MMOL/L (ref 135–145)
SOURCE SOURCE: NORMAL
WBC # BLD AUTO: 7.2 K/UL (ref 4.8–10.8)

## 2019-12-19 PROCEDURE — 700102 HCHG RX REV CODE 250 W/ 637 OVERRIDE(OP): Performed by: HOSPITALIST

## 2019-12-19 PROCEDURE — A9270 NON-COVERED ITEM OR SERVICE: HCPCS | Performed by: HOSPITALIST

## 2019-12-19 PROCEDURE — 700117 HCHG RX CONTRAST REV CODE 255: Performed by: INTERNAL MEDICINE

## 2019-12-19 PROCEDURE — 700102 HCHG RX REV CODE 250 W/ 637 OVERRIDE(OP): Performed by: INTERNAL MEDICINE

## 2019-12-19 PROCEDURE — 87070 CULTURE OTHR SPECIMN AEROBIC: CPT

## 2019-12-19 PROCEDURE — 302098 PASTE RING (FLAT)

## 2019-12-19 PROCEDURE — A9270 NON-COVERED ITEM OR SERVICE: HCPCS | Performed by: INTERNAL MEDICINE

## 2019-12-19 PROCEDURE — 770006 HCHG ROOM/CARE - MED/SURG/GYN SEMI*

## 2019-12-19 PROCEDURE — 85027 COMPLETE CBC AUTOMATED: CPT

## 2019-12-19 PROCEDURE — 99233 SBSQ HOSP IP/OBS HIGH 50: CPT | Performed by: INTERNAL MEDICINE

## 2019-12-19 PROCEDURE — 80069 RENAL FUNCTION PANEL: CPT

## 2019-12-19 PROCEDURE — 700112 HCHG RX REV CODE 229: Performed by: HOSPITALIST

## 2019-12-19 PROCEDURE — 87205 SMEAR GRAM STAIN: CPT

## 2019-12-19 PROCEDURE — 36415 COLL VENOUS BLD VENIPUNCTURE: CPT

## 2019-12-19 PROCEDURE — 72193 CT PELVIS W/DYE: CPT

## 2019-12-19 RX ADMIN — POLYETHYLENE GLYCOL 3350 1 PACKET: 17 POWDER, FOR SOLUTION ORAL at 17:45

## 2019-12-19 RX ADMIN — DOCUSATE SODIUM 100 MG: 100 CAPSULE, LIQUID FILLED ORAL at 17:45

## 2019-12-19 RX ADMIN — IOHEXOL 100 ML: 350 INJECTION, SOLUTION INTRAVENOUS at 18:21

## 2019-12-19 RX ADMIN — FERROUS SULFATE TAB 325 MG (65 MG ELEMENTAL FE) 325 MG: 325 (65 FE) TAB at 06:02

## 2019-12-19 RX ADMIN — BACLOFEN 10 MG: 10 TABLET ORAL at 17:45

## 2019-12-19 RX ADMIN — BACLOFEN 10 MG: 10 TABLET ORAL at 13:10

## 2019-12-19 RX ADMIN — FINASTERIDE 5 MG: 5 TABLET, FILM COATED ORAL at 06:02

## 2019-12-19 RX ADMIN — Medication 1 CAPSULE: at 09:06

## 2019-12-19 RX ADMIN — BACLOFEN 10 MG: 10 TABLET ORAL at 22:43

## 2019-12-19 RX ADMIN — DOCUSATE SODIUM 100 MG: 100 CAPSULE, LIQUID FILLED ORAL at 06:02

## 2019-12-19 RX ADMIN — SENNOSIDES 17.2 MG: 8.6 TABLET, FILM COATED ORAL at 06:01

## 2019-12-19 RX ADMIN — LOSARTAN POTASSIUM 50 MG: 50 TABLET, FILM COATED ORAL at 06:01

## 2019-12-19 RX ADMIN — AMOXICILLIN AND CLAVULANATE POTASSIUM 1 TABLET: 875; 125 TABLET, FILM COATED ORAL at 06:01

## 2019-12-19 RX ADMIN — FERROUS SULFATE TAB 325 MG (65 MG ELEMENTAL FE) 325 MG: 325 (65 FE) TAB at 17:45

## 2019-12-19 RX ADMIN — MULTIPLE VITAMINS W/ MINERALS TAB 1 TABLET: TAB at 06:02

## 2019-12-19 RX ADMIN — AMOXICILLIN AND CLAVULANATE POTASSIUM 1 TABLET: 875; 125 TABLET, FILM COATED ORAL at 17:45

## 2019-12-19 RX ADMIN — BACLOFEN 10 MG: 10 TABLET ORAL at 09:06

## 2019-12-19 RX ADMIN — DOXYCYCLINE 100 MG: 100 TABLET, FILM COATED ORAL at 17:45

## 2019-12-19 RX ADMIN — POLYETHYLENE GLYCOL 3350 1 PACKET: 17 POWDER, FOR SOLUTION ORAL at 06:02

## 2019-12-19 RX ADMIN — DOXYCYCLINE 100 MG: 100 TABLET, FILM COATED ORAL at 06:02

## 2019-12-19 ASSESSMENT — ENCOUNTER SYMPTOMS
BRUISES/BLEEDS EASILY: 1
NECK PAIN: 0
SHORTNESS OF BREATH: 0
TREMORS: 0
PALPITATIONS: 0
BACK PAIN: 0
ABDOMINAL PAIN: 0
HEADACHES: 0
TINGLING: 0
WEAKNESS: 1
FEVER: 0

## 2019-12-19 NOTE — PROGRESS NOTES
Assumed care at 0700, report received from NOC RN.  Pt A&O x 4, states pain is 0/10. Bed locked, 2 rails up, bed in lowest position. Call light in place, belongings at bedside, no needs at this time and hourly rounding in place.  Per wound nurse, requested RN to take turn off and disconnect wound vac and place a wet to dry dressing covered with mepilex. Pt is q2h turns, tolerates well.

## 2019-12-19 NOTE — CARE PLAN
Problem: Infection  Goal: Will remain free from infection  Outcome: PROGRESSING AS EXPECTED  Intervention: Assess signs and symptoms of infection  Note:   Patient showing no s/s of infection.      Problem: Knowledge Deficit  Goal: Knowledge of the prescribed therapeutic regimen will improve  Outcome: PROGRESSING AS EXPECTED  Intervention: Discuss information regarding therpeutic regimen and document in education  Note:   Patient understands medication orders.
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
## 2019-12-19 NOTE — WOUND TEAM
[facility]  Inpatient Services  Wound and Skin Care Progress Note    Admission Date: 12/6/2019     Consult Date: 12/11  [facility], PMH, SH: Reviewed    Unit where seen by Wound Team: [id number]     WOUND CONSULT RELATED TO:  Follow up on coccyx     Self Report / Pain Level:  denies       OBJECTIVE:  On JERARDO mattress.   Consent signed.  Neel RUBIN NP in to excise edges.     WOUND TYPE, LOCATION, CHARACTERISTICS (Pressure Injuries: location, stage, POA or date identified)         Negative Pressure Wound Therapy Coccyx (Active)   Dressing Type Black foam;Medium    Number of Foam Pieces Used 2    Canister Changed Yes    VAC VeraFlo Irrigant Normal Saline    VAC VeraFlo Soak Time (mins) 10    VAC VeraFlo Instill Volume (ml) 22    VAC VeraFlo - Therapy Time (hrs) 3.5    VAC VeraFlo Pressure (mm/Hg) Continuous;125 mmHg          Wound 12/11/19 Full Thickness Wound Coccyx Pressure injury vs full-thickness open abcess (Active)   Wound Image    12/18/2019  2:01 PM   Site Assessment Pink;Red    Raeann-wound Assessment Intact    Margins Unattached edges    Wound Length (cm) 6 cm    Wound Width (cm) 3.5 cm    Wound Depth (cm) 3.5 cm    Wound Surface Area (cm^2) 21 cm^2    Tunneling 0 cm    Undermining 9 cm    Closure Secondary intention    Drainage Amount Moderate    Drainage Description Sanguineous    Non-staged Wound Description Full thickness    Treatments Cleansed;Site care    Cleansing Normal Saline Irrigation    Periwound Protectant Benzoin    Dressing Options Wound Vac    Dressing Cleansing/Solutions Normal Saline    Dressing Changed New    Dressing Status Clean;Dry;Intact    Dressing Change Frequency Monday, Wednesday, Friday    NEXT Dressing Change  12/20/19    NEXT Weekly Photo (Inpatient Only) 12/25/19    WOUND NURSE ONLY - Odor Mild    WOUND NURSE ONLY - Exposed Structures Bone    WOUND NURSE ONLY - Tissue Type and Percentage 100% red    WOUND NURSE ONLY - Time Spent with Patient (mins) 75      Vascular:    Dorsal  Pedal pulses:  RLE, LLE +1 thready   Posterior tib pulses:   RLE, LLE +1 thready    JUAN MANUEL:      NA    Lab Values:    Lab Results   Component Value Date/Time    WBC 7.8 12/18/2019 03:14 AM    RBC 3.83 (L) 12/18/2019 03:14 AM    HEMOGLOBIN 11.5 (L) 12/18/2019 03:14 AM    HEMATOCRIT 35.6 (L) 12/18/2019 03:14 AM        No results found for: HBA1C      Culture:   NA- sacral bone biopsy done 12/6, negative for growth    INTERVENTIONS BY WOUND TEAM:  Turned patient to left side.  Removed previous dressing, cleansed with wound cleanser.  Neel RUBIN APRN in to debride/excise edges.  See Neel Note.  Bleeding controlled with pressure and silver nitrate.  Benzoin and paste ring to shira-wound.  VF foam to wound bed and undermining, sealed with drape, button and track pad applied.  Suction obtained.      Dressing Selection:  VF VAC     Interdisciplinary consultation: Patient, Bedside RN, Neel RUBIN APRN     EVALUATION: spoke to Dr. Moon via phone and updated on POC to open wound and apply wound VAC.  Dr. Moon agreeable, updated hospitalist.      Pt is paraplegic with decubitus ulcer to sacrum. Was seen by Dr. Ansari in August of this year for debridement and washout of sacral ulcer cavity including flap closure. Pt had developed osteomyelitis in July, prior to 's procedure. Pt is now admitted for concern of ongoing osteomyelitis, however sacral biopsy has been negative for growth and pathology showed no osteomyelitis.   Silver 1/4 in packing used in ulcer cavity to decrease the risk of infection. Pt may benefit from wound vac therapy if skin flap is removed, thus allowing greater area of coverage. Pt did undergo vera neftali therapy during last admission 7/24/19 for sacral ulcer.     Left posterior calf is ongoing issue for this pt. States he has had wound for 50 years. Scab is dry and JOSE JUAN, does not appear to be over bony prominence. Pt reports scar to L heel, however skin intact and blanching. Bilateral heel Mepilex and moon  boots applied to reduce risk of pressure injury development. Nursing to continue pressure injury prevention.    Factors affecting wound healing: paraplegia, abscess    Goals: Steady decrease in wound area and depth weekly.    NURSING PLAN OF CARE ORDERS (X):    Dressing changes: See Dressing Care orders: x  Skin care: See Skin Care orders: x  Rectal tube care: See Rectal Tube Care orders:   Other orders:    WOUND TEAM PLAN OF CARE (X):   NPWT change 3 x week:      X  Dressing changes by wound team:       Follow up as needed:      Other (explain):     Anticipated discharge plans (X): Will need ongoing wound care  SNF:    x       Home Care:           Outpatient Wound Center:            Self Care:            Other:

## 2019-12-19 NOTE — DISCHARGE PLANNING
@1400  Agency/Facility Name: [facility]  Outcome: Left message, awaiting call back.    @1030  [facility]  Spoke To: Krista  Outcome: Declined due to no medicare days and difficult with previous stays.    @0950  Agency/Facility Name: [facility]  Spoke To: Melody  Outcome: Pending review.    @0930  [facility]  Spoke To: Krista  Outcome: Checking medicare days. No beds today.  SW informed.

## 2019-12-20 LAB
ALBUMIN SERPL BCP-MCNC: 3.4 G/DL (ref 3.2–4.9)
BUN SERPL-MCNC: 16 MG/DL (ref 8–22)
CALCIUM SERPL-MCNC: 9 MG/DL (ref 8.5–10.5)
CHLORIDE SERPL-SCNC: 109 MMOL/L (ref 96–112)
CO2 SERPL-SCNC: 24 MMOL/L (ref 20–33)
CREAT SERPL-MCNC: 0.8 MG/DL (ref 0.5–1.4)
ERYTHROCYTE [DISTWIDTH] IN BLOOD BY AUTOMATED COUNT: 54.4 FL (ref 35.9–50)
GLUCOSE SERPL-MCNC: 104 MG/DL (ref 65–99)
HCT VFR BLD AUTO: 35.4 % (ref 42–52)
HGB BLD-MCNC: 11.4 G/DL (ref 14–18)
MCH RBC QN AUTO: 29.8 PG (ref 27–33)
MCHC RBC AUTO-ENTMCNC: 32.2 G/DL (ref 33.7–35.3)
MCV RBC AUTO: 92.4 FL (ref 81.4–97.8)
PHOSPHATE SERPL-MCNC: 4.5 MG/DL (ref 2.5–4.5)
PLATELET # BLD AUTO: 216 K/UL (ref 164–446)
PMV BLD AUTO: 8.6 FL (ref 9–12.9)
POTASSIUM SERPL-SCNC: 3.5 MMOL/L (ref 3.6–5.5)
RBC # BLD AUTO: 3.83 M/UL (ref 4.7–6.1)
SODIUM SERPL-SCNC: 142 MMOL/L (ref 135–145)
WBC # BLD AUTO: 6.2 K/UL (ref 4.8–10.8)

## 2019-12-20 PROCEDURE — 700111 HCHG RX REV CODE 636 W/ 250 OVERRIDE (IP): Performed by: HOSPITALIST

## 2019-12-20 PROCEDURE — 700105 HCHG RX REV CODE 258: Performed by: HOSPITALIST

## 2019-12-20 PROCEDURE — 700101 HCHG RX REV CODE 250: Performed by: INTERNAL MEDICINE

## 2019-12-20 PROCEDURE — A9270 NON-COVERED ITEM OR SERVICE: HCPCS | Performed by: HOSPITALIST

## 2019-12-20 PROCEDURE — 700102 HCHG RX REV CODE 250 W/ 637 OVERRIDE(OP): Performed by: HOSPITALIST

## 2019-12-20 PROCEDURE — A9270 NON-COVERED ITEM OR SERVICE: HCPCS | Performed by: INTERNAL MEDICINE

## 2019-12-20 PROCEDURE — 36415 COLL VENOUS BLD VENIPUNCTURE: CPT

## 2019-12-20 PROCEDURE — 99233 SBSQ HOSP IP/OBS HIGH 50: CPT | Performed by: INTERNAL MEDICINE

## 2019-12-20 PROCEDURE — 770006 HCHG ROOM/CARE - MED/SURG/GYN SEMI*

## 2019-12-20 PROCEDURE — 700102 HCHG RX REV CODE 250 W/ 637 OVERRIDE(OP): Performed by: INTERNAL MEDICINE

## 2019-12-20 PROCEDURE — 85027 COMPLETE CBC AUTOMATED: CPT

## 2019-12-20 PROCEDURE — 80069 RENAL FUNCTION PANEL: CPT

## 2019-12-20 PROCEDURE — 94760 N-INVAS EAR/PLS OXIMETRY 1: CPT

## 2019-12-20 PROCEDURE — 700112 HCHG RX REV CODE 229: Performed by: HOSPITALIST

## 2019-12-20 RX ORDER — SODIUM HYPOCHLORITE 1.25 MG/ML
SOLUTION TOPICAL 2 TIMES DAILY
Status: DISCONTINUED | OUTPATIENT
Start: 2019-12-20 | End: 2019-12-23 | Stop reason: HOSPADM

## 2019-12-20 RX ORDER — SODIUM CHLORIDE 9 MG/ML
1000 INJECTION, SOLUTION INTRAVENOUS ONCE
Status: COMPLETED | OUTPATIENT
Start: 2019-12-20 | End: 2019-12-20

## 2019-12-20 RX ADMIN — DOCUSATE SODIUM 100 MG: 100 CAPSULE, LIQUID FILLED ORAL at 18:20

## 2019-12-20 RX ADMIN — DOXYCYCLINE 100 MG: 100 TABLET, FILM COATED ORAL at 08:52

## 2019-12-20 RX ADMIN — SODIUM HYPOCHLORITE 10 ML: 1.25 SOLUTION TOPICAL at 18:23

## 2019-12-20 RX ADMIN — BACLOFEN 10 MG: 10 TABLET ORAL at 21:13

## 2019-12-20 RX ADMIN — SODIUM CHLORIDE 1000 ML: 9 INJECTION, SOLUTION INTRAVENOUS at 05:40

## 2019-12-20 RX ADMIN — BACLOFEN 10 MG: 10 TABLET ORAL at 09:00

## 2019-12-20 RX ADMIN — POLYETHYLENE GLYCOL 3350 1 PACKET: 17 POWDER, FOR SOLUTION ORAL at 09:00

## 2019-12-20 RX ADMIN — FINASTERIDE 5 MG: 5 TABLET, FILM COATED ORAL at 08:52

## 2019-12-20 RX ADMIN — DOCUSATE SODIUM 100 MG: 100 CAPSULE, LIQUID FILLED ORAL at 08:53

## 2019-12-20 RX ADMIN — POLYETHYLENE GLYCOL 3350 1 PACKET: 17 POWDER, FOR SOLUTION ORAL at 18:23

## 2019-12-20 RX ADMIN — SODIUM CHLORIDE 1000 ML: 9 INJECTION, SOLUTION INTRAVENOUS at 04:10

## 2019-12-20 RX ADMIN — AMOXICILLIN AND CLAVULANATE POTASSIUM 1 TABLET: 875; 125 TABLET, FILM COATED ORAL at 07:30

## 2019-12-20 RX ADMIN — ENALAPRILAT 1.25 MG: 1.25 INJECTION INTRAVENOUS at 02:46

## 2019-12-20 RX ADMIN — BACLOFEN 10 MG: 10 TABLET ORAL at 14:13

## 2019-12-20 RX ADMIN — MULTIPLE VITAMINS W/ MINERALS TAB 1 TABLET: TAB at 08:53

## 2019-12-20 RX ADMIN — AMOXICILLIN AND CLAVULANATE POTASSIUM 1 TABLET: 875; 125 TABLET, FILM COATED ORAL at 18:20

## 2019-12-20 RX ADMIN — DOXYCYCLINE 100 MG: 100 TABLET, FILM COATED ORAL at 18:20

## 2019-12-20 RX ADMIN — BACLOFEN 10 MG: 10 TABLET ORAL at 18:20

## 2019-12-20 RX ADMIN — FERROUS SULFATE TAB 325 MG (65 MG ELEMENTAL FE) 325 MG: 325 (65 FE) TAB at 18:20

## 2019-12-20 RX ADMIN — ACETAMINOPHEN 650 MG: 325 TABLET, FILM COATED ORAL at 02:47

## 2019-12-20 RX ADMIN — SENNOSIDES 17.2 MG: 8.6 TABLET, FILM COATED ORAL at 08:53

## 2019-12-20 RX ADMIN — FERROUS SULFATE TAB 325 MG (65 MG ELEMENTAL FE) 325 MG: 325 (65 FE) TAB at 08:52

## 2019-12-20 RX ADMIN — Medication 1 CAPSULE: at 09:00

## 2019-12-20 ASSESSMENT — ENCOUNTER SYMPTOMS
NAUSEA: 0
PALPITATIONS: 0
TREMORS: 0
MYALGIAS: 0
FEVER: 0
COUGH: 0
WEAKNESS: 1
BRUISES/BLEEDS EASILY: 1
TINGLING: 0
BLURRED VISION: 0
ABDOMINAL PAIN: 0
HEADACHES: 1
VOMITING: 0

## 2019-12-20 NOTE — WOUND TEAM
[facility]  Inpatient Services  Wound and Skin Care Progress Note     Admission Date: 12/6/2019     Consult Date: 12/19  [facility], PMH, SH: Reviewed    Unit where seen by Wound Team: [id number]     WOUND CONSULT RELATED TO:  Follow up on coccyx      Self Report / Pain Level:  denies        OBJECTIVE:  On JERARDO mattress. Vac off. Mepilex in place.      WOUND TYPE, LOCATION, CHARACTERISTICS (Pressure Injuries: location, stage, POA or date identified)        Wound 12/11/19 Full Thickness Wound Coccyx;Sacrum Complicated open surgical wound (Active)   Wound Image    12/18/2019     Site Assessment Bleeding;Dark edges;Red 12/19/2019     Raeann-wound Assessment Intact;Pink    Margins Defined edges;Unattached edges    Wound Length (cm) 6 cm    Wound Width (cm) 3.5 cm    Wound Depth (cm) 3.5 cm    Wound Surface Area (cm^2) 21 cm^2    Tunneling 0 cm    Undermining 9 cm    Closure Secondary intention    Drainage Amount Copious    Drainage Description Purulent;Sanguineous;Tan    Non-staged Wound Description Full thickness    Treatments Cleansed;Irrigation;Site care;Direct pressure    Cleansing Normal Saline Irrigation    Periwound Protectant Not Applicable    Dressing Options Moist Gauze;Mepilex    Dressing Cleansing/Solutions Normal Saline    Dressing Changed New    Dressing Status Clean;Intact    Dressing Change Frequency Every Shift    NEXT Dressing Change  12/19/19    NEXT Weekly Photo (Inpatient Only) 12/24/19    WOUND NURSE ONLY - Odor None    WOUND NURSE ONLY - Exposed Structures Bone;Muscle    WOUND NURSE ONLY - Tissue Type and Percentage Red, Dark Purple            Vascular:     Dorsal Pedal pulses:             Not assessed    Posterior tib pulses:              Not assessed  JUAN MANUEL:                                          NA     Lab Values:     Lab Results   Component Value Date/Time    SODIUM 141 12/19/2019 02:53 AM    POTASSIUM 4.1 12/19/2019 02:53 AM    CHLORIDE 109 12/19/2019 02:53 AM    CO2 25 12/19/2019 02:53 AM     GLUCOSE 97 12/19/2019 02:53 AM    BUN 17 12/19/2019 02:53 AM    CREATININE 0.64 12/19/2019 02:53 AM      No results found for: HBA1C                 Culture:  Sacrococcygeal taken today 12/19.     INTERVENTIONS BY WOUND TEAM:  Turned patient to left side.  Removed previous dressing, cleansed with wound cleanser.  Bleeding noted. MD notified, in at bedside to see pt. Packed with NS gauze. Mepliex placed.      Dressing Selection:  Moist gauze, mepilex.      Interdisciplinary consultation: Patient, Bedside RN, Wound Care RN, MD      EVALUATION: Pt wound vac was not working per primary RN, vac off and mepliex placed by primary RN. Mepilex removed, black foam still in place - removed from wound. Cleansed with NS. Purulent drainage noted from proximal wound margin. Brice blood noted from L distal wound bed undermining, pooling in the wound bed. MD notified, in to see wound. Wound bed is dark purple, no odor noted. Bone is palpable at base and in the undermining on the R iliac crest. Periwound intact. NS moistened gauze placed, covered with Mepilex. R/t bleeding, wound vac therapy discontinued at this time. Order for Q2 hour turns placed.     Factors affecting wound healing: paraplegia, abscess      Goals: Steady decrease in wound area and depth weekly.     NURSING PLAN OF CARE ORDERS (X):     Dressing changes: See Dressing Care orders: x  Skin care: See Skin Care orders: x  Rectal tube care: See Rectal Tube Care orders:   Other orders:     WOUND TEAM PLAN OF CARE (X):   NPWT change 3 x week:      X  Dressing changes by wound team:       Follow up as needed:      Other (explain):      Anticipated discharge plans (X): Will need ongoing wound care  SNF:    x       Home Care:           Outpatient Wound Center:            Self Care:            Other:

## 2019-12-20 NOTE — CARE PLAN
Problem: Discharge Barriers/Planning  Goal: Patient's continuum of care needs will be met  12/20/2019 1231 by Fernando Reddy R.N.  Outcome: PROGRESSING AS EXPECTED  12/20/2019 1132 by Fernando Reddy R.N.  Outcome: PROGRESSING AS EXPECTED  Intervention: Assess potential discharge barriers on admission and throughout hospital stay  12/20/2019 1231 by Fernando Reddy R.N.  Note:   [facility] to [facility] in [facility]. Pt aware and agreeable.  12/20/2019 1132 by Fernando Reddy R.N.  Note:   SW sending referrals. See notes.

## 2019-12-20 NOTE — DISCHARGE PLANNING
Anticipated Discharge Disposition: SNF    Action: LSW spoke with Melody at [facility]. Melody checking Medicare days.    Barriers to Discharge: None    Plan: LSW continue to assess for discharge needs.

## 2019-12-20 NOTE — PROGRESS NOTES
CCT notified rn that patient oxygen was gradually going down. CCT  Notified RN that pulse ox went down to 79% CCT put head up 35 degrees an d oxygen came back up to 97% vitals taken and charted.

## 2019-12-20 NOTE — CARE PLAN
Problem: Discharge Barriers/Planning  Goal: Patient's continuum of care needs will be met  Outcome: PROGRESSING AS EXPECTED  Intervention: Assess potential discharge barriers on admission and throughout hospital stay  Note:   SW sending referrals. See notes.

## 2019-12-20 NOTE — CODE DOCUMENTATION
MD paged prior to rapid regarding what appeared to be an autonomic dysreflexia episode/HTN at 0244 (/99)  due to kinked hutchins catheter and then resulting HOTN at 0411 (BP 88/47).  Vasotec given at 0246.  Orders for NS bolus received for HOTN.

## 2019-12-20 NOTE — FLOWSHEET NOTE
12/20/19 0411   Events/Summary/Plan   Events/Summary/Plan Rapid Response activated   Respiratory WDL   Respiratory (WDL) X   Chest Exam   Respiration 12   Pulse 73   Breath Sounds   Pre/Post Intervention Pre Intervention Assessment   RUL Breath Sounds Clear   RML Breath Sounds Clear   RLL Breath Sounds Diminished   ADDIE Breath Sounds Clear   LLL Breath Sounds Diminished   Oximetry   #Pulse Oximetry (Single Determination) Yes   Oxygen   Pulse Oximetry 93 %   O2 (LPM) 0   O2 Daily Delivery Respiratory  Room Air with O2 Available   Events   Rapid Response Smart Text Completed? Yes   Respiratory Rapid Response Note    Symptoms Hypertensive/Hypotensive emergency     Breath Sounds  Pre/Post Intervention: Pre Intervention Assessment (12/20/19 0411)  RUL Breath Sounds: Clear (12/20/19 0411)  RML Breath Sounds: Clear (12/20/19 0411)  RLL Breath Sounds: Diminished (12/20/19 0411)  ADDIE Breath Sounds: Clear (12/20/19 0411)  LLL Breath Sounds: Diminished (12/20/19 0411)              ABG Results NONE     O2 (LPM): 0 (12/20/19 0411)  O2 Daily Delivery Respiratory : Room Air with O2 Available (12/20/19 0411)    Events/Summary/Plan: Rapid Response activated (12/20/19 0411)  Rapid Response activated for Hypertension/Hypotension.  Placed pt on 2LNC due to desaturation/inaccurate reading on pulse ox.   Rapid Response RN released RT.

## 2019-12-20 NOTE — PROGRESS NOTES
"Received bedside report from day shift RN. Fall precautions in place and assessment completed. Patient involved in report. Assumed patient care at 1900. Patient vital signs /81   Pulse 100   Temp 37.3 °C (99.2 °F) (Temporal)   Resp 16   Ht 1.778 m (5' 10\")   Wt 84.1 kg (185 lb 6.5 oz)   SpO2 93%   BMI 26.60 kg/m² . Will continue to monitor.   "

## 2019-12-20 NOTE — CARE PLAN
Problem: Communication  Goal: The ability to communicate needs accurately and effectively will improve  Outcome: PROGRESSING AS EXPECTED  Intervention: [place] patient and significant other/support system to call light to alert staff of needs  Flowsheets (Taken 12/20/2019 0010)  Oriented to:: All of the Following : Location of Bathroom, Visiting Policy, Unit Routine, Call Light and Bedside Controls, Bedside Rail Policy, Smoking Policy, Rights and Responsibilities, Bedside Report, and Patient Education Notebook  Note:   Patient uses call light appropriately.      Problem: Knowledge Deficit  Goal: Knowledge of disease process/condition, treatment plan, diagnostic tests, and medications will improve  Outcome: PROGRESSING AS EXPECTED  Intervention: Assess knowledge level of disease process/condition, treatment plan, diagnostic tests, and medications  Note:   Patient aware of plan.

## 2019-12-20 NOTE — PROGRESS NOTES
Pt with BP over 200 @ 0244. BP rechecked and still above 200 prior to admin of vasotec per MAR orders. S/P admin, writer found pt's hutchins was kinked. Emptied for 1L of urine. Pt monitored closely but went hypotensive with a brief moment of unresponsiveness when pt's HOB was raised. Pt laid flat, reverse trend. Rapid response called. Given 2 L fluid bolus of NS per MD telephone order. As of writing, pt has finished 1.5L of 2L order. BP's improved by continue to be mildly hypotensive 99/54 @0619.

## 2019-12-20 NOTE — CODE DOCUMENTATION
Pharmacist Kirsten called regarding vasotec.  Per pharmacy IV fluid bolus should assist in increasing pressure.
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
## 2019-12-20 NOTE — WOUND TEAM
[facility]  Inpatient Services  Wound and Skin Care Progress Note    HPI, PMH, SH: Reviewed    WOUND TEAM FOLLOW UP: consult received for new wound to left buttock.   Unit where seen by Wound Team: [id number]    Self Report / Pain Level: none at this wound    WOUND TYPE, LOCATION, CHARACTERISTICS:       Pressure Injury 12/18/19 Buttocks from hutchins port or cap (Active)   Pressure Injury Stage 1    State of Healing Non-healing    Site Assessment Red    Raeann-wound Assessment Intact    Margins Attached edges;Undefined edges    Wound Length (cm) 1 cm    Wound Width (cm) 1 cm    Wound Depth (cm) 0 cm    Wound Surface Area (cm^2) 1 cm^2    Tunneling 0 cm    Undermining 0 cm    Closure Open to air    Drainage Amount None    Non-staged Wound Description Not applicable    Treatments Site care    Cleansing Not Applicable    Periwound Protectant Not Applicable    Dressing Options Open to Air    Dressing Cleansing/Solutions Not Applicable    NEXT Weekly Photo (Inpatient Only) 12/26/19    WOUND NURSE ONLY - Odor None    WOUND NURSE ONLY - Tissue Type and Percentage 100% red          INTERVENTIONS BY WOUND TEAM: patient turned with moderate assistance from staff. Red nonblanching [place] to left lower buttock is the same size and the cap to a saline flush. Wound is not open so no dressing applied. Patient is on a low air loss overlay.     Interdisciplinary consultation: patient and nursing.    EVALUATION AND PROGRESS OF WOUND(S): wound should resolve since object that caused it has been removed and patient is on low air loss bed and being turned Q 2 hours.    Rationale for changes in Plan of Care: no changes at this time    Factors affecting wound healing: paraplegia.   Goals: wound will resolve in 2 weeks    NURSING PLAN OF CARE:    Dressing changes: Continue previous Dressing Care orders:     X   See new Dressing Care orders:  X     Skin care: See Skin Care orders:        Rectal tube care: See Rectal Tube Care orders:       Other orders:           WOUND TEAM PLAN OF CARE (X):   NPWT change 3 x week:        Dressing changes:       Follow up as needed:     X weekly on left buttock pressure injury  Other:

## 2019-12-21 LAB
ALBUMIN SERPL BCP-MCNC: 3.2 G/DL (ref 3.2–4.9)
BACTERIA WND AEROBE CULT: NORMAL
BUN SERPL-MCNC: 16 MG/DL (ref 8–22)
CALCIUM SERPL-MCNC: 8.9 MG/DL (ref 8.5–10.5)
CHLORIDE SERPL-SCNC: 112 MMOL/L (ref 96–112)
CO2 SERPL-SCNC: 24 MMOL/L (ref 20–33)
CREAT SERPL-MCNC: 0.63 MG/DL (ref 0.5–1.4)
ERYTHROCYTE [DISTWIDTH] IN BLOOD BY AUTOMATED COUNT: 55.1 FL (ref 35.9–50)
GLUCOSE SERPL-MCNC: 97 MG/DL (ref 65–99)
GRAM STN SPEC: NORMAL
HCT VFR BLD AUTO: 31.4 % (ref 42–52)
HGB BLD-MCNC: 10.2 G/DL (ref 14–18)
MCH RBC QN AUTO: 30.4 PG (ref 27–33)
MCHC RBC AUTO-ENTMCNC: 32.5 G/DL (ref 33.7–35.3)
MCV RBC AUTO: 93.5 FL (ref 81.4–97.8)
PHOSPHATE SERPL-MCNC: 3.4 MG/DL (ref 2.5–4.5)
PLATELET # BLD AUTO: 191 K/UL (ref 164–446)
PMV BLD AUTO: 8.7 FL (ref 9–12.9)
POTASSIUM SERPL-SCNC: 3.8 MMOL/L (ref 3.6–5.5)
RBC # BLD AUTO: 3.36 M/UL (ref 4.7–6.1)
SIGNIFICANT IND 70042: NORMAL
SITE SITE: NORMAL
SODIUM SERPL-SCNC: 142 MMOL/L (ref 135–145)
SOURCE SOURCE: NORMAL
WBC # BLD AUTO: 5.6 K/UL (ref 4.8–10.8)

## 2019-12-21 PROCEDURE — 80069 RENAL FUNCTION PANEL: CPT

## 2019-12-21 PROCEDURE — 700102 HCHG RX REV CODE 250 W/ 637 OVERRIDE(OP): Performed by: INTERNAL MEDICINE

## 2019-12-21 PROCEDURE — 36415 COLL VENOUS BLD VENIPUNCTURE: CPT

## 2019-12-21 PROCEDURE — 700111 HCHG RX REV CODE 636 W/ 250 OVERRIDE (IP): Performed by: HOSPITALIST

## 2019-12-21 PROCEDURE — 99232 SBSQ HOSP IP/OBS MODERATE 35: CPT | Performed by: INTERNAL MEDICINE

## 2019-12-21 PROCEDURE — A9270 NON-COVERED ITEM OR SERVICE: HCPCS | Performed by: INTERNAL MEDICINE

## 2019-12-21 PROCEDURE — 700102 HCHG RX REV CODE 250 W/ 637 OVERRIDE(OP): Performed by: HOSPITALIST

## 2019-12-21 PROCEDURE — A9270 NON-COVERED ITEM OR SERVICE: HCPCS | Performed by: HOSPITALIST

## 2019-12-21 PROCEDURE — 700112 HCHG RX REV CODE 229: Performed by: HOSPITALIST

## 2019-12-21 PROCEDURE — 85027 COMPLETE CBC AUTOMATED: CPT

## 2019-12-21 PROCEDURE — 770006 HCHG ROOM/CARE - MED/SURG/GYN SEMI*

## 2019-12-21 RX ORDER — AMOXICILLIN AND CLAVULANATE POTASSIUM 875; 125 MG/1; MG/1
1 TABLET, FILM COATED ORAL EVERY 12 HOURS
Status: DISCONTINUED | OUTPATIENT
Start: 2019-12-21 | End: 2019-12-23 | Stop reason: HOSPADM

## 2019-12-21 RX ORDER — DOXYCYCLINE 100 MG/1
100 TABLET ORAL EVERY 12 HOURS
Status: DISCONTINUED | OUTPATIENT
Start: 2019-12-21 | End: 2019-12-23 | Stop reason: HOSPADM

## 2019-12-21 RX ADMIN — ACETAMINOPHEN 650 MG: 325 TABLET, FILM COATED ORAL at 08:15

## 2019-12-21 RX ADMIN — SODIUM HYPOCHLORITE 1 ML: 1.25 SOLUTION TOPICAL at 04:42

## 2019-12-21 RX ADMIN — DOCUSATE SODIUM 100 MG: 100 CAPSULE, LIQUID FILLED ORAL at 04:41

## 2019-12-21 RX ADMIN — BACLOFEN 10 MG: 10 TABLET ORAL at 08:12

## 2019-12-21 RX ADMIN — FINASTERIDE 5 MG: 5 TABLET, FILM COATED ORAL at 04:41

## 2019-12-21 RX ADMIN — Medication 1 CAPSULE: at 08:12

## 2019-12-21 RX ADMIN — DOXYCYCLINE 100 MG: 100 TABLET, FILM COATED ORAL at 04:41

## 2019-12-21 RX ADMIN — BACLOFEN 10 MG: 10 TABLET ORAL at 14:26

## 2019-12-21 RX ADMIN — BACLOFEN 10 MG: 10 TABLET ORAL at 20:40

## 2019-12-21 RX ADMIN — POLYETHYLENE GLYCOL 3350 1 PACKET: 17 POWDER, FOR SOLUTION ORAL at 16:53

## 2019-12-21 RX ADMIN — FERROUS SULFATE TAB 325 MG (65 MG ELEMENTAL FE) 325 MG: 325 (65 FE) TAB at 16:53

## 2019-12-21 RX ADMIN — FERROUS SULFATE TAB 325 MG (65 MG ELEMENTAL FE) 325 MG: 325 (65 FE) TAB at 04:42

## 2019-12-21 RX ADMIN — DOCUSATE SODIUM 100 MG: 100 CAPSULE, LIQUID FILLED ORAL at 16:53

## 2019-12-21 RX ADMIN — AMOXICILLIN AND CLAVULANATE POTASSIUM 1 TABLET: 875; 125 TABLET, FILM COATED ORAL at 16:53

## 2019-12-21 RX ADMIN — AMOXICILLIN AND CLAVULANATE POTASSIUM 1 TABLET: 875; 125 TABLET, FILM COATED ORAL at 04:40

## 2019-12-21 RX ADMIN — SODIUM HYPOCHLORITE 1 ML: 1.25 SOLUTION TOPICAL at 16:53

## 2019-12-21 RX ADMIN — SENNOSIDES 17.2 MG: 8.6 TABLET, FILM COATED ORAL at 04:41

## 2019-12-21 RX ADMIN — MULTIPLE VITAMINS W/ MINERALS TAB 1 TABLET: TAB at 04:41

## 2019-12-21 RX ADMIN — RIVAROXABAN 20 MG: 20 TABLET, FILM COATED ORAL at 16:53

## 2019-12-21 RX ADMIN — POLYETHYLENE GLYCOL 3350 1 PACKET: 17 POWDER, FOR SOLUTION ORAL at 04:40

## 2019-12-21 RX ADMIN — DOXYCYCLINE 100 MG: 100 TABLET, FILM COATED ORAL at 16:53

## 2019-12-21 RX ADMIN — BACLOFEN 10 MG: 10 TABLET ORAL at 16:52

## 2019-12-21 ASSESSMENT — ENCOUNTER SYMPTOMS
PALPITATIONS: 0
TREMORS: 0
BRUISES/BLEEDS EASILY: 0
CHILLS: 0
MYALGIAS: 0
TINGLING: 0
ABDOMINAL PAIN: 0
WEAKNESS: 1
COUGH: 0
BLURRED VISION: 0
HEADACHES: 0

## 2019-12-21 ASSESSMENT — CHA2DS2 SCORE
DIABETES: NO
HYPERTENSION: NO
AGE 65 TO 74: YES
SEX: MALE
CHF OR LEFT VENTRICULAR DYSFUNCTION: NO
VASCULAR DISEASE: NO
PRIOR STROKE OR TIA OR THROMBOEMBOLISM: NO
AGE 75 OR GREATER: NO
CHA2DS2 VASC SCORE: 1

## 2019-12-21 NOTE — DISCHARGE PLANNING
Agency/Facility Name: [facility]  Spoke To: Melody  Outcome: Will need to check with business office regarding medicare days. No available bed for patient until Monday.

## 2019-12-21 NOTE — WOUND TEAM
[facility]  Inpatient Services  Wound and Skin Care Progress Note     Admission Date: 12/6/2019     Consult Date: 12/19  HPI, PMH, SH: Reviewed  Pt has hx of muscle flap to sacrum by Dr Moon.    Unit where seen by Wound Team: [id number]     WOUND CONSULT RELATED TO:  Follow up on coccyx      Self Report / Pain Level:  denies        OBJECTIVE:  On JERARDO mattress. Mepilex in place.      WOUND TYPE, LOCATION, CHARACTERISTICS (Pressure Injuries: location, stage, POA or date identified)              Wound 12/11/19 Full Thickness Wound Coccyx;Sacrum Complicated open surgical wound (Active)   Wound Image    12/20/2019  4:00 PM   Site Assessment Red;Edema 12/20/2019  4:00 PM   Raeann-wound Assessment Pink 12/20/2019  4:00 PM   Margins Defined edges 12/20/2019  4:00 PM   Wound Length (cm) 6 cm 12/20/2019  4:00 PM   Wound Width (cm) 3 cm 12/20/2019  4:00 PM   Wound Depth (cm) 4 cm 12/20/2019  4:00 PM   Wound Surface Area (cm^2) 18 cm^2 12/20/2019  4:00 PM   Tunneling 0 cm 12/11/2019  2:00 PM   Undermining 7 cm 12/20/2019  4:00 PM   Closure Secondary intention 12/20/2019  4:00 PM   Drainage Amount Small 12/20/2019  4:00 PM   Drainage Description Serosanguineous 12/20/2019  4:00 PM   Non-staged Wound Description Full thickness 12/18/2019  8:00 PM   Treatments Site care;Cleansed 12/20/2019  4:00 PM   Cleansing Approved Wound Cleanser 12/20/2019  4:00 PM   Periwound Protectant Skin Protectant wipes to Periwound 12/20/2019  4:00 PM   Dressing Options Roll Gauze;Mepilex 12/20/2019  4:00 PM   Dressing Cleansing/Solutions 1/4 Strength Dakin's Solution 12/20/2019  4:00 PM   Dressing Changed Changed 12/20/2019  4:00 PM   Dressing Status Clean;Dry;Intact 12/20/2019  8:00 AM   Dressing Change Frequency Every Shift 12/20/2019  4:00 PM   NEXT Dressing Change  12/20/19 12/20/2019  4:00 PM   NEXT Weekly Photo (Inpatient Only) 12/27/19 12/20/2019  4:00 PM   WOUND NURSE ONLY - Odor None 12/20/2019  4:00 PM   WOUND NURSE ONLY -  Exposed Structures Bone;Muscle 12/20/2019  4:00 PM   WOUND NURSE ONLY - Tissue Type and Percentage red, dark puprle 12/20/2019  4:00 PM   WOUND NURSE ONLY - Time Spent with Patient (mins) 60 12/20/2019  4:00 PM             Vascular:     Dorsal Pedal pulses:             Not assessed    Posterior tib pulses:              Not assessed  JUAN MANUEL:                                          NA     Lab Values:     Lab Results   Component Value Date/Time    SODIUM 142 12/20/2019 03:21 AM    POTASSIUM 3.5 (L) 12/20/2019 03:21 AM    CHLORIDE 109 12/20/2019 03:21 AM    CO2 24 12/20/2019 03:21 AM    GLUCOSE 104 (H) 12/20/2019 03:21 AM    BUN 16 12/20/2019 03:21 AM    CREATININE 0.80 12/20/2019 03:21 AM      No results found for: HBA1C                 Culture:  Sacrococcygeal taken 12/19.     INTERVENTIONS BY WOUND TEAM:  Turned patient to left side.  Removed previous dressing, cleansed with wound cleanser.  packed with saline soaked rolled gauze and covered with mepilex, skin prep shira wound.  Pictures and measurements taken,  Neel DE ANDA and Dr Moreira in to see wound      Dressing Selection:  Moist gauze, mepilex.      Interdisciplinary consultation: Patient, Bedside RN Fernando, Wound Care APRN, MD      EVALUATION:  Wound bed sub-optimal with edema and friable granular tissue.  Continuing VAC holiday and pack with rolled gauze to address extensive undermining and Dakins to reduce local bacteria and improve granular tissue quality.  Pt remains a good candidate for the VAC VF.    Factors affecting wound healing: paraplegia, abscess      Goals: Steady decrease in wound area and depth weekly.     NURSING PLAN OF CARE ORDERS (X):     Dressing changes: See Dressing Care orders: x  Skin care: See Skin Care orders: x  Rectal tube care: See Rectal Tube Care orders:   Other orders:     WOUND TEAM PLAN OF CARE (X):   NPWT change 3 x week:      X - 12/20/19 VAC hold  Dressing changes by wound team:       Follow up as needed:    X nursing  to perform dakins dressing change to sacrum every shift until VAC is resumed.  Nursing performing foot and leg dressing changes  Other (explain):      Anticipated discharge plans (X): Will need ongoing wound care  SNF:    x     pt will need ongoing wound care upon discharge from hospital.  Home Care:           Outpatient Wound Center:            Self Care:            Other:

## 2019-12-21 NOTE — PROGRESS NOTES
Patient stable vitals, adequate I/os, colostomy and hutchins cath, blood pressure spikes if hutchins is kinked due to autonomic dysreflexia, tylenol for headache, q2 turns, patient refuses turns sometimes, wound to be changed per wound care order, pleasant, axox4, saline locked, good appetitte.
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
## 2019-12-21 NOTE — CARE PLAN
Problem: Safety  Goal: Will remain free from injury  Outcome: PROGRESSING AS EXPECTED  Note:   Patient bed in low position, call light in reach, bed alarm PRN, call for assistance promoted, nonslip socks on     Problem: Infection  Goal: Will remain free from infection  Outcome: PROGRESSING AS EXPECTED  Note:   Patient vitals, mentation, I/os, and labs monitored throughout shift

## 2019-12-21 NOTE — CONSULTS
DATE OF SERVICE:  12/20/2019    CHIEF COMPLAINT:  Sacral wound.    HISTORY OF PRESENT ILLNESS:  The patient is a 69-year-old white male who is   paraplegic who I have been asked to see about 2 weeks ago.  I had seen the   patient at that time and had recommended that he have a dressing change to his   wound at followup with his plastic surgeon, Dr. Moon.  There was no sign of   infection at that time.  The patient had a normal white count, was afebrile.    Despite this, the patient went on to have evidently biopsies that I did not   perform of the area, cultures of the wound and some type of bedside   debridement.  I have not recommended any of this.  Nevertheless, the patient   continues to be afebrile.  No sign of infection, but now has a larger wound.    The patient's past medical history has not changed since I saw him 2 weeks   ago.    FAMILY HISTORY:  Noncontributory.    SOCIAL HISTORY:  He is a nonsmoker.    ALLERGIES:  HE HAS AN ALLERGY TO SULFA.    MEDICATIONS:  Reviewed.    REVIEW OF SYSTEMS:  Not changed since I saw him 2 weeks ago.    PHYSICAL EXAMINATION:  GENERAL:  He continues to be a very pleasant white male lying in bed.  He is   normocephalic, atraumatic.  NECK:  Supple.  CHEST:  Clear.  ABDOMEN:  Soft.  He does have a colostomy.  EXTREMITIES:  Show now a defect in the sacral area approximately 4x4 cm.  This   goes right down to the sacrum itself.    ASSESSMENT AND PLAN:  At this point, I think dressing changes continue to be   what is indicated.  A negative pressure dressing such as a VAC dressing would   be totally appropriate if it can be placed successfully.  Other than this, I   do not think there is any surgery that I would recommend at this time.  I   wound not think he would need hospitalization to have his negative pressure   dressing.  I would have him follow up with Dr. Moon.       ____________________________________     MD DAYANARA RENE / [place]    DD:  12/20/2019  16:18:27  DT:  12/20/2019 19:55:04    D#:  [id number]  Job#:  [id number]

## 2019-12-22 LAB
ALBUMIN SERPL BCP-MCNC: 3.2 G/DL (ref 3.2–4.9)
BUN SERPL-MCNC: 13 MG/DL (ref 8–22)
CALCIUM SERPL-MCNC: 9.2 MG/DL (ref 8.5–10.5)
CHLORIDE SERPL-SCNC: 110 MMOL/L (ref 96–112)
CO2 SERPL-SCNC: 26 MMOL/L (ref 20–33)
CREAT SERPL-MCNC: 0.65 MG/DL (ref 0.5–1.4)
ERYTHROCYTE [DISTWIDTH] IN BLOOD BY AUTOMATED COUNT: 55.1 FL (ref 35.9–50)
GLUCOSE SERPL-MCNC: 97 MG/DL (ref 65–99)
HCT VFR BLD AUTO: 33.7 % (ref 42–52)
HGB BLD-MCNC: 10.7 G/DL (ref 14–18)
MCH RBC QN AUTO: 29.8 PG (ref 27–33)
MCHC RBC AUTO-ENTMCNC: 31.8 G/DL (ref 33.7–35.3)
MCV RBC AUTO: 93.9 FL (ref 81.4–97.8)
PHOSPHATE SERPL-MCNC: 3.5 MG/DL (ref 2.5–4.5)
PLATELET # BLD AUTO: 219 K/UL (ref 164–446)
PMV BLD AUTO: 8.7 FL (ref 9–12.9)
POTASSIUM SERPL-SCNC: 4.4 MMOL/L (ref 3.6–5.5)
RBC # BLD AUTO: 3.59 M/UL (ref 4.7–6.1)
SODIUM SERPL-SCNC: 145 MMOL/L (ref 135–145)
WBC # BLD AUTO: 5.9 K/UL (ref 4.8–10.8)

## 2019-12-22 PROCEDURE — 700112 HCHG RX REV CODE 229: Performed by: HOSPITALIST

## 2019-12-22 PROCEDURE — 700102 HCHG RX REV CODE 250 W/ 637 OVERRIDE(OP): Performed by: HOSPITALIST

## 2019-12-22 PROCEDURE — A9270 NON-COVERED ITEM OR SERVICE: HCPCS | Performed by: HOSPITALIST

## 2019-12-22 PROCEDURE — 36415 COLL VENOUS BLD VENIPUNCTURE: CPT

## 2019-12-22 PROCEDURE — 99232 SBSQ HOSP IP/OBS MODERATE 35: CPT | Performed by: INTERNAL MEDICINE

## 2019-12-22 PROCEDURE — A9270 NON-COVERED ITEM OR SERVICE: HCPCS | Performed by: INTERNAL MEDICINE

## 2019-12-22 PROCEDURE — 700102 HCHG RX REV CODE 250 W/ 637 OVERRIDE(OP): Performed by: INTERNAL MEDICINE

## 2019-12-22 PROCEDURE — 85027 COMPLETE CBC AUTOMATED: CPT

## 2019-12-22 PROCEDURE — 770006 HCHG ROOM/CARE - MED/SURG/GYN SEMI*

## 2019-12-22 PROCEDURE — 80069 RENAL FUNCTION PANEL: CPT

## 2019-12-22 RX ORDER — LOSARTAN POTASSIUM 25 MG/1
25 TABLET ORAL
Status: DISCONTINUED | OUTPATIENT
Start: 2019-12-22 | End: 2019-12-23 | Stop reason: HOSPADM

## 2019-12-22 RX ADMIN — FERROUS SULFATE TAB 325 MG (65 MG ELEMENTAL FE) 325 MG: 325 (65 FE) TAB at 06:05

## 2019-12-22 RX ADMIN — DOXYCYCLINE 100 MG: 100 TABLET, FILM COATED ORAL at 17:37

## 2019-12-22 RX ADMIN — FINASTERIDE 5 MG: 5 TABLET, FILM COATED ORAL at 06:06

## 2019-12-22 RX ADMIN — DOCUSATE SODIUM 100 MG: 100 CAPSULE, LIQUID FILLED ORAL at 06:05

## 2019-12-22 RX ADMIN — SODIUM HYPOCHLORITE 473 ML: 1.25 SOLUTION TOPICAL at 17:35

## 2019-12-22 RX ADMIN — RIVAROXABAN 20 MG: 20 TABLET, FILM COATED ORAL at 17:37

## 2019-12-22 RX ADMIN — POLYETHYLENE GLYCOL 3350 1 PACKET: 17 POWDER, FOR SOLUTION ORAL at 06:04

## 2019-12-22 RX ADMIN — Medication 1 CAPSULE: at 08:10

## 2019-12-22 RX ADMIN — BACLOFEN 10 MG: 10 TABLET ORAL at 08:10

## 2019-12-22 RX ADMIN — MULTIPLE VITAMINS W/ MINERALS TAB 1 TABLET: TAB at 06:06

## 2019-12-22 RX ADMIN — DOXYCYCLINE 100 MG: 100 TABLET, FILM COATED ORAL at 06:05

## 2019-12-22 RX ADMIN — DOCUSATE SODIUM 100 MG: 100 CAPSULE, LIQUID FILLED ORAL at 17:36

## 2019-12-22 RX ADMIN — POLYETHYLENE GLYCOL 3350 1 PACKET: 17 POWDER, FOR SOLUTION ORAL at 17:35

## 2019-12-22 RX ADMIN — BACLOFEN 10 MG: 10 TABLET ORAL at 12:16

## 2019-12-22 RX ADMIN — AMOXICILLIN AND CLAVULANATE POTASSIUM 1 TABLET: 875; 125 TABLET, FILM COATED ORAL at 17:36

## 2019-12-22 RX ADMIN — FERROUS SULFATE TAB 325 MG (65 MG ELEMENTAL FE) 325 MG: 325 (65 FE) TAB at 17:36

## 2019-12-22 RX ADMIN — BACLOFEN 10 MG: 10 TABLET ORAL at 17:35

## 2019-12-22 RX ADMIN — LOSARTAN POTASSIUM 25 MG: 25 TABLET ORAL at 13:45

## 2019-12-22 RX ADMIN — SODIUM HYPOCHLORITE 1 ML: 1.25 SOLUTION TOPICAL at 06:07

## 2019-12-22 RX ADMIN — SENNOSIDES 17.2 MG: 8.6 TABLET, FILM COATED ORAL at 06:05

## 2019-12-22 RX ADMIN — BACLOFEN 10 MG: 10 TABLET ORAL at 20:27

## 2019-12-22 RX ADMIN — AMOXICILLIN AND CLAVULANATE POTASSIUM 1 TABLET: 875; 125 TABLET, FILM COATED ORAL at 06:05

## 2019-12-22 ASSESSMENT — ENCOUNTER SYMPTOMS
BRUISES/BLEEDS EASILY: 0
HEADACHES: 0
BLURRED VISION: 0
TREMORS: 0
NAUSEA: 0
WEAKNESS: 1
VOMITING: 0
CHILLS: 0
FEVER: 0
TINGLING: 0
COUGH: 0

## 2019-12-22 NOTE — PROGRESS NOTES
2 RN skin check complete with Maida RN  Devices in ostomy, urinary catheter, heel float boots  Skin assessed under devices Yes.  Confirmed pressure ulcers found on coccyx, left 3rd and 4th toes, left first toe  New potential pressure ulcers NA. Wound consult already in Place  The following interventions in place heel float boots, every 2 hour turns, low air loss mattress,  meplilex to heels and coccyx, gauze between right 1st and 2nd toe to separate toes.

## 2019-12-22 NOTE — CARE PLAN
Problem: Communication  Goal: The ability to communicate needs accurately and effectively will improve  Outcome: PROGRESSING AS EXPECTED     Problem: Safety  Goal: Will remain free from injury  Outcome: PROGRESSING AS EXPECTED  Goal: Will remain free from falls  Outcome: PROGRESSING AS EXPECTED     Problem: Bowel/Gastric:  Goal: Normal bowel function is maintained or improved  Outcome: PROGRESSING AS EXPECTED  Goal: Will not experience complications related to bowel motility  Outcome: PROGRESSING AS EXPECTED     Problem: Knowledge Deficit  Goal: Knowledge of disease process/condition, treatment plan, diagnostic tests, and medications will improve  Outcome: PROGRESSING AS EXPECTED  Goal: Knowledge of the prescribed therapeutic regimen will improve  Outcome: PROGRESSING AS EXPECTED     Problem: Skin Integrity  Goal: Risk for impaired skin integrity will decrease  Outcome: PROGRESSING AS EXPECTED     Problem: Pain Management  Goal: Pain level will decrease to patient's comfort goal  Outcome: PROGRESSING AS EXPECTED
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
## 2019-12-22 NOTE — PROGRESS NOTES
Report received from day RNSHAYLEE. Assumed care of pt at 1915. Pt is A&Ox4 on room air, paraplegic. Cazares and ostomy in place, pt is high risk for skin break down - refer to care plan for interventions. Plan of care discussed and all questions answered. Fall precautions in place - bed is in low and locked position, call light/belongings within reach. RN/CNA extensions provided, pt appropriately demonstrates how to utilize call light.

## 2019-12-22 NOTE — PROGRESS NOTES
"Bedside report received.  Assessment complete.  A&O x 4. Patient calls appropriately.  Patient turns in bed with two assist. Bed alarm refused.   Patient has 0/10 pain.   Denies N&V. Tolerating regular diet.  Surgical dressing CDI.  + void in hutchins, + flatus, ostomy in place for BM.  Patient denies SOB.  SCD's refused, heel protectors in use.  Patient is pleasant and cooperative.  Review plan with of care with patient. Call light and personal belongings with in reach. Hourly rounding in place. All needs met at this time.  /90   Pulse 74   Temp 36.1 °C (97 °F) (Temporal)   Resp 17   Ht 1.778 m (5' 10\")   Wt 81.2 kg (179 lb 0.2 oz)   SpO2 96%   BMI 25.69 kg/m²     "

## 2019-12-22 NOTE — CARE PLAN
Problem: Skin Integrity  Goal: Risk for impaired skin integrity will decrease  Outcome: PROGRESSING AS EXPECTED  Q2 turns, low air loss mattress, barrier cream, pillows for repositioning in use     Problem: Pain Management  Goal: Pain level will decrease to patient's comfort goal  Outcome: PROGRESSING AS EXPECTED   Pt denies pain, able to rest without s/s distress.

## 2019-12-23 VITALS
DIASTOLIC BLOOD PRESSURE: 90 MMHG | HEIGHT: 70 IN | OXYGEN SATURATION: 95 % | BODY MASS INDEX: 25.63 KG/M2 | RESPIRATION RATE: 16 BRPM | SYSTOLIC BLOOD PRESSURE: 155 MMHG | TEMPERATURE: 98.7 F | WEIGHT: 179.01 LBS | HEART RATE: 71 BPM

## 2019-12-23 PROBLEM — M46.28 OSTEOMYELITIS OF COCCYX (HCC): Status: RESOLVED | Noted: 2019-07-24 | Resolved: 2019-12-23

## 2019-12-23 PROBLEM — M86.9 OSTEOMYELITIS OF COCCYX (HCC): Status: RESOLVED | Noted: 2019-07-24 | Resolved: 2019-12-23

## 2019-12-23 PROBLEM — K59.00 CONSTIPATION: Status: RESOLVED | Noted: 2019-12-14 | Resolved: 2019-12-23

## 2019-12-23 LAB
ALBUMIN SERPL BCP-MCNC: 3.4 G/DL (ref 3.2–4.9)
BUN SERPL-MCNC: 13 MG/DL (ref 8–22)
CALCIUM SERPL-MCNC: 8.9 MG/DL (ref 8.5–10.5)
CHLORIDE SERPL-SCNC: 108 MMOL/L (ref 96–112)
CO2 SERPL-SCNC: 27 MMOL/L (ref 20–33)
CREAT SERPL-MCNC: 0.65 MG/DL (ref 0.5–1.4)
ERYTHROCYTE [DISTWIDTH] IN BLOOD BY AUTOMATED COUNT: 54.3 FL (ref 35.9–50)
GLUCOSE SERPL-MCNC: 99 MG/DL (ref 65–99)
HCT VFR BLD AUTO: 33.8 % (ref 42–52)
HGB BLD-MCNC: 10.8 G/DL (ref 14–18)
MCH RBC QN AUTO: 30.1 PG (ref 27–33)
MCHC RBC AUTO-ENTMCNC: 32 G/DL (ref 33.7–35.3)
MCV RBC AUTO: 94.2 FL (ref 81.4–97.8)
PHOSPHATE SERPL-MCNC: 3.3 MG/DL (ref 2.5–4.5)
PLATELET # BLD AUTO: 217 K/UL (ref 164–446)
PMV BLD AUTO: 8.6 FL (ref 9–12.9)
POTASSIUM SERPL-SCNC: 3.8 MMOL/L (ref 3.6–5.5)
RBC # BLD AUTO: 3.59 M/UL (ref 4.7–6.1)
SODIUM SERPL-SCNC: 139 MMOL/L (ref 135–145)
WBC # BLD AUTO: 5.7 K/UL (ref 4.8–10.8)

## 2019-12-23 PROCEDURE — 700112 HCHG RX REV CODE 229: Performed by: HOSPITALIST

## 2019-12-23 PROCEDURE — 700102 HCHG RX REV CODE 250 W/ 637 OVERRIDE(OP): Performed by: INTERNAL MEDICINE

## 2019-12-23 PROCEDURE — 700102 HCHG RX REV CODE 250 W/ 637 OVERRIDE(OP): Performed by: HOSPITALIST

## 2019-12-23 PROCEDURE — A9270 NON-COVERED ITEM OR SERVICE: HCPCS | Performed by: HOSPITALIST

## 2019-12-23 PROCEDURE — A9270 NON-COVERED ITEM OR SERVICE: HCPCS | Performed by: INTERNAL MEDICINE

## 2019-12-23 PROCEDURE — 36415 COLL VENOUS BLD VENIPUNCTURE: CPT

## 2019-12-23 PROCEDURE — 80069 RENAL FUNCTION PANEL: CPT

## 2019-12-23 PROCEDURE — 99239 HOSP IP/OBS DSCHRG MGMT >30: CPT | Performed by: INTERNAL MEDICINE

## 2019-12-23 PROCEDURE — 85027 COMPLETE CBC AUTOMATED: CPT

## 2019-12-23 RX ORDER — AMOXICILLIN AND CLAVULANATE POTASSIUM 875; 125 MG/1; MG/1
1 TABLET, FILM COATED ORAL EVERY 12 HOURS
Qty: 24 TAB | Refills: 0
Start: 2019-12-23 | End: 2020-01-04

## 2019-12-23 RX ORDER — SODIUM HYPOCHLORITE 1.25 MG/ML
10 SOLUTION TOPICAL 2 TIMES DAILY
Qty: 1 BOTTLE | Refills: 1 | Status: ON HOLD
Start: 2019-12-23 | End: 2020-12-23

## 2019-12-23 RX ORDER — LACTOBACILLUS RHAMNOSUS GG 10B CELL
1 CAPSULE ORAL
Qty: 11 CAP | Refills: 0
Start: 2019-12-24 | End: 2020-01-04

## 2019-12-23 RX ORDER — DOXYCYCLINE 100 MG/1
100 TABLET ORAL EVERY 12 HOURS
Qty: 24 TAB | Refills: 0
Start: 2019-12-23 | End: 2020-01-04

## 2019-12-23 RX ADMIN — Medication 1 CAPSULE: at 09:26

## 2019-12-23 RX ADMIN — SENNOSIDES 17.2 MG: 8.6 TABLET, FILM COATED ORAL at 05:27

## 2019-12-23 RX ADMIN — DOXYCYCLINE 100 MG: 100 TABLET, FILM COATED ORAL at 06:00

## 2019-12-23 RX ADMIN — POLYETHYLENE GLYCOL 3350 1 PACKET: 17 POWDER, FOR SOLUTION ORAL at 05:26

## 2019-12-23 RX ADMIN — FINASTERIDE 5 MG: 5 TABLET, FILM COATED ORAL at 05:28

## 2019-12-23 RX ADMIN — SODIUM HYPOCHLORITE 50 ML: 1.25 SOLUTION TOPICAL at 05:32

## 2019-12-23 RX ADMIN — AMOXICILLIN AND CLAVULANATE POTASSIUM 1 TABLET: 875; 125 TABLET, FILM COATED ORAL at 05:27

## 2019-12-23 RX ADMIN — BACLOFEN 10 MG: 10 TABLET ORAL at 09:26

## 2019-12-23 RX ADMIN — MULTIPLE VITAMINS W/ MINERALS TAB 1 TABLET: TAB at 05:27

## 2019-12-23 RX ADMIN — FERROUS SULFATE TAB 325 MG (65 MG ELEMENTAL FE) 325 MG: 325 (65 FE) TAB at 05:28

## 2019-12-23 RX ADMIN — BACLOFEN 10 MG: 10 TABLET ORAL at 12:57

## 2019-12-23 RX ADMIN — DOCUSATE SODIUM 100 MG: 100 CAPSULE, LIQUID FILLED ORAL at 05:28

## 2019-12-23 RX ADMIN — LOSARTAN POTASSIUM 25 MG: 25 TABLET ORAL at 06:00

## 2019-12-23 NOTE — DISCHARGE PLANNING
@1300  Agency/Facility Name: [facility]  Spoke To: Melody  Outcome: Informed of transport time.    Received Transport Form @ 1300  Spoke to Danuta @ [facility]    Transport is scheduled for 12/23 @1400 going to [facility].  SW informed.

## 2019-12-23 NOTE — DISCHARGE INSTRUCTIONS
Discharge Instructions    Discharged to other by ambulance with escort. Discharged via ambulance, hospital escort: Yes.  Special equipment needed: Not Applicable    Be sure to schedule a follow-up appointment with your primary care doctor or any specialists as instructed.     Discharge Plan:   Diet Plan: Discussed  Activity Level: Discussed  Confirmed Follow up Appointment: Patient to Call and Schedule Appointment  Confirmed Symptoms Management: Discussed  Medication Reconciliation Updated: Yes  Influenza Vaccine Indication: Patient Refuses    I understand that a diet low in cholesterol, fat, and sodium is recommended for good health. Unless I have been given specific instructions below for another diet, I accept this instruction as my diet prescription.   Other diet: Regular as tolerated    Special Instructions: None    · Is patient discharged on Warfarin / Coumadin?   No     Depression / Suicide Risk    As you are discharged from this [facility] facility, it is important to learn how to keep safe from harming yourself.    Recognize the warning signs:  · Abrupt changes in personality, positive or negative- including increase in energy   · Giving away possessions  · Change in eating patterns- significant weight changes-  positive or negative  · Change in sleeping patterns- unable to sleep or sleeping all the time   · Unwillingness or inability to communicate  · Depression  · Unusual sadness, discouragement and loneliness  · Talk of wanting to die  · Neglect of personal appearance   · Rebelliousness- reckless behavior  · Withdrawal from people/activities they love  · Confusion- inability to concentrate     If you or a loved one observes any of these behaviors or has concerns about self-harm, here's what you can do:  · Talk about it- your feelings and reasons for harming yourself  · Remove any means that you might use to hurt yourself (examples: pills, rope, extension cords, firearm)  · Get professional help from  the community (Mental Health, Substance Abuse, psychological counseling)  · Do not be alone:Call your Safe Contact- someone whom you trust who will be there for you.  · Call your local CRISIS HOTLINE 797-8903 or 382-221-2924  · Call your local Children's Mobile Crisis Response Team [place] (957) 915-2345 or www.[employer]  · Call the toll free National Suicide Prevention Hotlines   · National Suicide Prevention Lifeline 413-793-LXPG (8468)  · National Hope Line Network 800-SUICIDE (593-4205)          Preventing Pressure Injuries  Introduction  WHAT IS A PRESSURE INJURY?  A pressure injury, previously called a bedsore or a pressure ulcer, is an injury to the skin and underlying tissue caused by pressure. A pressure injury can happen when your skin presses against a surface, such as a mattress or wheelchair seat, for too long. The pressure on the blood vessels causes reduced blood flow to your skin. This can eventually cause the skin tissue to die and break down into a wound.  Pressure injuries usually develop:  · Over bony parts of the body, such as the tailbone, shoulders, elbows, hips, and heels.  · Under medical devices, such as respiratory equipment, stockings, tubes, and splints.  They can cause pain, muscle damage, and infection.  HOW DO PRESSURE INJURIES HAPPEN?  Pressure injuries are caused by a lack of blood supply to an area of skin. These injuries begin as a reddened area on the skin and can become an open sore. They can result from intense pressure over a short period of time or from less pressure over a long period of time. Pressure injuries can vary in severity.  This condition is more likely to develop in people who:  · Are in the hospital or an extended care facility.  · Are bedridden or in a wheelchair.  · Have an injury or disease that keeps them from:  ¨ Moving normally.  ¨ Feeling pain or pressure.  ¨ Communicating if they feel pain or pressure.  · Have a condition that:  ¨ Makes them  sleepy or less alert.  ¨ Causes poor blood flow.  · Need to wear a medical device.  · Have poor control of their bladder or bowel functions (incontinence).  · Have poor nutrition (malnutrition).  · Have had this condition before.  · Are of certain ethnicities. People of  and  or  descent are at higher risk compared to other ethnic groups.  HOW CAN I PREVENT PRESSURE INJURIES?  Skin Care  · Keep your skin clean and dry. Gently pat your skin dry.  · Do not rub or massage maddi areas of your skin.  · Moisturize dry skin.  · Use gentle cleansers and skin protectants routinely if you are incontinent.  · Check your skin every day for any changes in color and for any new blisters or sores. Make sure to check under and around any medical devices and between skin folds. Have a caregiver do this for you if you are not able.  Reducing and Redistributing Pressure  · Do not lie or sit in one position for a long time. Move or change position every two hours, or as told by your health care provider.  · Use pillows or cushions to redistribute pressure. Ask your health care provider to recommend cushions or pads for you.  · Use medical devices that to not rub your skin. Tell your health care provider if one of your medical devices is causing pain or irritation.  Medicines  · Take over-the-counter and prescription medicines only as told by your health care provider.  · If you were prescribed an antibiotic medicine, take it or apply it as told by your health care provider. Do not stop taking or using the antibiotic even if your condition improves.  General Instructions  · Be as active as you can every day. Ask your health care provider to suggest safe exercises or activities.  · Work with your health care provider to manage any chronic health conditions.  · Eat a healthy diet that includes lots of protein. Ask your health care provider for diet advice.  · Drink enough fluid to keep your urine clear or  pale yellow.  · Do not abuse drugs or alcohol.  · Do not smoke.  · Keep all follow-up visits as told by your health care provider. This is important.  WHAT STEPS WILL BE TAKEN TO PREVENT PRESSURE INJURIES IF I AM IN THE HOSPITAL?  Your health care providers:  · Will inspect your skin at least daily. Skin under or around medical devices should be checked at least twice a day while you are in the hospital.  · May recommend that you use certain types of bedding to help prevent them. These may include a pad, mattress, or chair cushion that is filled with gel, air, water, or foam.  · Will evaluate your nutrition and consult a diet specialist (dietician), if needed.  · Will inspect and change any wound dressings regularly.  · May help you move into different positions every few hours.  · Will adjust any medical devices and braces as needed to limit pressure on your skin.  · Will keep your skin clean and dry.  · May use gentle cleansers and skin protectants, if you are incontinent.  · Will moisturize any dry skin.  Make sure that you let your health care provider know if you feel or see any changes in your skin.  This information is not intended to replace advice given to you by your health care provider. Make sure you discuss any questions you have with your health care provider.  Document Released: 01/25/2006 Document Revised: 05/25/2017 Document Reviewed: 09/22/2016  © 2017 [employer]

## 2019-12-23 NOTE — DISCHARGE PLANNING
LSW completed COBRA. LSW got signature from pt. consenting [facility] to transfer pt. via [facility] to [facility] and releasing medical records to the skilled facility, [facility] requested 1400 transport time. LSW faxed PCS to the [facility]. Informed MD of above.

## 2019-12-23 NOTE — CARE PLAN
Problem: Venous Thromboembolism (VTW)/Deep Vein Thrombosis (DVT) Prevention:  Goal: Patient will participate in Venous Thrombosis (VTE)/Deep Vein Thrombosis (DVT)Prevention Measures  Outcome: PROGRESSING AS EXPECTED   ROM exercises performed with patient    Problem: Skin Integrity  Goal: Risk for impaired skin integrity will decrease  Outcome: PROGRESSING AS EXPECTED   Q2 turns, low air loss bed, heel float boats, heel foam dressing in use.

## 2019-12-23 NOTE — PROGRESS NOTES
Called Omar at [facility] to give him telephone report. Pt currently in no acute distress and appears calm. All PIV's removed. Pt to transfer via [facility]. Cazares and colostomy secured and emptied. All belongings in bags and collected.

## 2019-12-23 NOTE — PROGRESS NOTES
Report received from day RNBLAKE. Assumed care of pt at 1905. Pt is A&Ox4, on room air. Pt denies pain at this time. Pt is paraplegic - low air loss mattress, Q2 turns implemented. Plan of care discussed and pt verbalizes understanding. Surgical dressing to sacrum - clean dry and intact. Ostomy and hutchins in place - placement verified. Heel float boots in use, with foam dressing. Fall precautions in place - bed is in low and locked position, call light/belongings within reach. RN/CNA extensions provided, pt appropriately demonstrates how to utilize call light

## 2019-12-23 NOTE — DISCHARGE PLANNING
@1200  Agency/Facility Name: [facility]  Spoke To: Melody  Outcome: Per LSW cancelled transport.    @1145  Agency/Facility Name: [facility]  Spoke To: Melody  Outcome: Transport set up for 1300.  SW informed.    @1125  Agency/Facility Name: [facility]  Spoke To: Melody  Outcome: Accepted and have bed today.  SW informed.    @1110  Agency/Facility Name: [facility]  Spoke To: Melody  Outcome: Checking insurance.    @0940  Agency/Facility Name: [facility]  Outcome: Left message, awaiting call back.

## 2019-12-23 NOTE — DISCHARGE SUMMARY
Discharge Summary    CHIEF COMPLAINT ON ADMISSION  Chief Complaint   Patient presents with   • Wound Check       Reason for Admission  EMS     CODE STATUS  DNAR/DNI    HPI & HOSPITAL COURSE  This is a 69 y.o. male here with above medical issues.     Paraplegia, neurogenic bowel and bladder, chronic Cazares, ostomy, chronic sacrococcygeal decubitus ulcer complicated by infection, coccygeal areas/p I&D and sacral flap surgery by Dr. Moon. He came back into the hospital for concerns of worsening infection and full dehiscence. There was concerned about osteomyelitis involved in this area now. He was placed on unasyn and vancomycin and both infectious diseases and plastic surgery were consulted. Plastic surgery felt the wound appeared ok and deemed him not needing any further surgery and needs aggressive wound care.    ID felt otherwise. CT pelvis was performed that did not show any true evidence of bony involvement or deeper abscess. He cannot get an MRI 2/2 metal rods in his lower extremities. Wound care was consulted as well. Patient underwent coccygeal biopsy by IR which showed no evidence of osteomyelitis. He also underwent IR guided drainage of this area which was subsequently removed. Patient had no new culture data on this admission, but prior admission had cultures that grew MRSE. His antibiotics were trimmed down to augmentin and doxycycline which will terminate on 1/4/20. Patient did have some mild bleeding and there was superficial debridement done at the bedside 5 days before discharge. Xarelto was temporarily held and then re-started without bleeding issues. Plastic surgery came and saw the patient's wound again as the wound care staff was concerned about some edematous material. Plastic surgery felt that the patient still did not require any surgical intervention and continued aggressive wound care. He will need ongoing aggressive wound care and he currently does not have a wound vacuum in place.      Patient was also noted on telemetry to have bradycardia. Cardiology evaluated the patient and stopped his outpatient flecainide and metoprolol with some improvement. His losartan can continue and may need to be increased over time to bob in his HTN.  He might benefit from an ablation In the near future.     Additionally, patient had infrequent episodes of autonomic dysreflexia and special attention needs to be paid to his hutchins catheter patency.          Most recent picture of wound.       Therefore, he is discharged in fair and stable condition to SNF.    The patient met 2-midnight criteria for an inpatient stay at the time of discharge.      FOLLOW UP ITEMS POST DISCHARGE  F/U with plastics Dr Moon in 1-2 weeks  F/U with ID In 2 weeks    DISCHARGE DIAGNOSES  Principal Problem (Resolved):    Osteomyelitis of coccyx (HCC) POA: Yes  Active Problems:    Bradycardia POA: Yes    Paroxysmal atrial flutter (HCC) POA: Yes    Essential hypertension POA: Yes    Paraplegia ([facility]) POA: Yes    Neurogenic bladder POA: Yes    Anemia POA: Yes    S/P colostomy ([facility]) POA: Yes    Prolonged QT interval POA: Yes  Resolved Problems:    Constipation POA: Yes      FOLLOW UP  No future appointments.  [facility] Medicine   [address], [place] [address]  [phone]      Please call to schedule your hospital follow up and to establish with a new Primary Care Physician. Thank you.     [facility] [address] [place] [address]  [phone]      Please call to schedule your hospital follow up and to establish with a new Primary Care Physician. Thank you.       MEDICATIONS ON DISCHARGE     Medication List      START taking these medications      Instructions   amoxicillin-clavulanate 875-125 MG Tabs  Commonly known as:  AUGMENTIN   Take 1 Tab by mouth every 12 hours for 12 days.  Dose:  1 Tab     dakins 0.125% (1/4 strength) 0.125 % Soln   Apply 10 mL to affected area(s) 2 Times a Day.  Dose:  10  mL     doxycycline monohydrate 100 MG tablet  Commonly known as:  ADOXA   Take 1 Tab by mouth every 12 hours for 12 days.  Dose:  100 mg     lactobacillus rhamnosus Caps capsule  Start taking on:  December 24, 2019   Take 1 Cap by mouth every morning with breakfast for 11 days.  Dose:  1 Cap        CONTINUE taking these medications      Instructions   baclofen 10 MG Tabs  Commonly known as:  LIORESAL   Take 10 mg by mouth 4 times a day.  Dose:  10 mg     docusate sodium 100 MG Caps  Commonly known as:  COLACE   Take 100 mg by mouth 2 times a day.  Dose:  100 mg     ferrous sulfate 325 (65 Fe) MG tablet   Take 325 mg by mouth 2 Times a Day.  Dose:  325 mg     finasteride 5 MG Tabs  Commonly known as:  PROSCAR   Take 5 mg by mouth every day.  Dose:  5 mg     losartan 25 MG Tabs  Commonly known as:  COZAAR   Take 25 mg by mouth every day. Hold HTN meds for SBP <100 or DBP <65  Dose:  25 mg     LPS CRITICAL CARE SUGAR FREE Liqd   Take 17 g by mouth every day.  Dose:  17 g     sennosides 8.6 MG Tabs  Commonly known as:  SENOKOT   Take 17.2 mg by mouth every day.  Dose:  17.2 mg     therapeutic multivitamin-minerals Tabs   Take 1 Tab by mouth every day.  Dose:  1 Tab     traZODone 50 MG Tabs  Commonly known as:  DESYREL   Take 50 mg by mouth every evening.  Dose:  50 mg     XARELTO 10 MG Tabs tablet  Generic drug:  rivaroxaban   Take 10 mg by mouth with dinner.  Dose:  10 mg        STOP taking these medications    flecainide 50 MG tablet  Commonly known as:  TAMBOCOR     metoprolol 25 MG Tabs  Commonly known as:  LOPRESSOR            Allergies  Allergies   Allergen Reactions   • Sulfa Drugs Rash     Rash         DIET  Orders Placed This Encounter   Procedures   • Diet Order Regular     Standing Status:   Standing     Number of Occurrences:   1     Order Specific Question:   Diet:     Answer:   Regular [1]       ACTIVITY  As tolerated and directed by skilled nursing.  Weight bearing as tolerated    LINES, DRAINS, AND  WOUNDS  This is an automated list. Peripheral IVs will be removed prior to discharge.  Peripheral IV 12/11/19 20 G Left;Posterior Hand (Active)   Site Assessment Clean;Dry;Intact 12/23/2019 10:00 AM   Dressing Type Transparent 12/23/2019 10:00 AM   Line Status Scrubbed the hub prior to access;Flushed;Saline locked 12/23/2019 10:00 AM   Dressing Status Clean;Dry;Intact 12/23/2019 10:00 AM   Dressing Intervention N/A 12/23/2019 10:00 AM   Infiltration Grading ([facility], [facility]) 0 12/23/2019 10:00 AM   Phlebitis Scale ([facility] Only) 0 12/23/2019 10:00 AM       Peripheral IV 12/19/19 20 G Anterior;Left;Proximal Forearm (Active)   Site Assessment Clean;Dry;Intact 12/23/2019 10:00 AM   Dressing Type Transparent 12/23/2019 10:00 AM   Line Status Scrubbed the hub prior to access;Flushed;Saline locked 12/23/2019 10:00 AM   Dressing Status Clean;Dry;Intact 12/23/2019 10:00 AM   Dressing Intervention N/A 12/23/2019 10:00 AM   Infiltration Grading ([facility], [facility]) 0 12/23/2019 10:00 AM   Phlebitis Scale ([facility] Only) 0 12/23/2019 10:00 AM     Closed/Suction Drain 1 Locking Loop Catheter 8 Fr. (Active)   Site Description Unable to view 12/17/2019  9:00 PM   Dressing Type Occlusive;Securing device;Skin barrier;Transparent 12/17/2019  9:00 PM   Dressing Status Clean;Dry;Intact 12/17/2019  9:00 PM   Drainage Appearance Pink tinged;Thin 12/17/2019  9:00 PM   Tube Status To bulb suction 12/17/2019  9:00 PM   Output (mL) 1600 mL 12/23/2019  6:00 AM       Colostomy LUQ (Active)   Stomal Appliance 2 piece 12/23/2019 10:00 AM   Stoma Assessment [place] 12/23/2019 10:00 AM   Stoma Shape Oval 12/23/2019 10:00 AM   Stoma Size (cm) 3.81 12/21/2019 10:11 AM   Peristomal Assessment Clean;Intact 12/23/2019 10:00 AM   Treatment Placement checked 12/23/2019 10:00 AM   Output (mL) 250 mL 12/22/2019  4:00 PM   WOUND NURSE ONLY - Time Spent with Patient (mins) 45 12/14/2019 11:14 AM       Urethral Catheter (Active)   Site Assessment Clean;Skin intact  12/23/2019 10:00 AM   Collection Container Standard drainage bag 12/23/2019 10:00 AM   Urinary Catheter Care Tamper Evident Seal in Place;Drainage Tube Extended;Drainage Bag Not Overfilled;Drainage Tubing Properly Secured;Drainage Bag Below Bladder Level and Not on Floor;Cath Care Done with Soap & Water 12/23/2019 10:00 AM   Securement Method Securing device (Describe) 12/23/2019 10:00 AM   Output (mL) 300 mL 12/22/2019  4:00 PM      Wound 12/10/19 Full Thickness Wound Leg LLE (Active)   Wound Image   12/11/2019  1:12 AM   Site Assessment Clean;Dry;Intact 12/23/2019 10:00 AM   Raeann-wound Assessment [place];Pale 12/23/2019 10:00 AM   Margins Attached edges 12/23/2019 10:00 AM   Wound Length (cm) 1 cm 12/11/2019  2:00 PM   Wound Width (cm) 1.5 cm 12/11/2019  2:00 PM   Wound Depth (cm) 0 cm 12/11/2019  2:00 PM   Wound Surface Area (cm^2) 1.5 cm^2 12/11/2019  2:00 PM   Tunneling 0 cm 12/11/2019  2:00 PM   Undermining 0 cm 12/11/2019  2:00 PM   Closure Open to air 12/23/2019 10:00 AM   Drainage Amount None 12/23/2019 10:00 AM   Drainage Description Sanguineous 12/20/2019  3:00 AM   Non-staged Wound Description Not applicable 12/21/2019  8:00 AM   Treatments Cleansed;Site care;Irrigation 12/20/2019  3:00 AM   Cleansing Not Applicable 12/21/2019  8:00 AM   Periwound Protectant Not Applicable 12/21/2019  8:00 AM   Dressing Options Open to Air 12/23/2019 10:00 AM   Dressing Cleansing/Solutions Not Applicable 12/21/2019  8:00 AM   Dressing Changed Other (Comment) 12/20/2019  7:00 PM   Dressing Change Frequency Every Shift 12/20/2019  3:00 AM   NEXT Weekly Photo (Inpatient Only) 12/25/19 12/22/2019  7:35 PM   WOUND NURSE ONLY - Odor None 12/11/2019  2:00 PM   WOUND NURSE ONLY - Pulses 1+;Thready;DP;PT;Left;Right 12/11/2019  2:00 PM   WOUND NURSE ONLY - Exposed Structures None 12/11/2019  2:00 PM   WOUND NURSE ONLY - Tissue Type and Percentage red pink tissue 12/11/2019  2:00 PM   WOUND NURSE ONLY - Time Spent with Patient (mins)  60 12/11/2019  2:00 PM       Wound 12/11/19 Full Thickness Wound Coccyx;Sacrum Complicated open surgical wound (Active)   Wound Image   12/20/2019  9:18 PM   Site Assessment Drainage;[place] 12/22/2019  6:00 PM   Raeann-wound Assessment Pink 12/22/2019  6:00 PM   Margins KEN 12/22/2019  8:00 AM   Wound Length (cm) 6 cm 12/20/2019  4:00 PM   Wound Width (cm) 3 cm 12/20/2019  4:00 PM   Wound Depth (cm) 4 cm 12/20/2019  4:00 PM   Wound Surface Area (cm^2) 18 cm^2 12/20/2019  4:00 PM   Tunneling 0 cm 12/11/2019  2:00 PM   Undermining 7 cm 12/20/2019  4:00 PM   Closure Secondary intention 12/21/2019  8:00 AM   Drainage Amount Moderate 12/22/2019  6:00 PM   Drainage Description Serosanguineous 12/22/2019  6:00 PM   Non-staged Wound Description Full thickness 12/21/2019  8:00 AM   Treatments Cleansed;Site care 12/22/2019  6:00 PM   Cleansing Approved Wound Cleanser 12/22/2019  6:00 PM   Periwound Protectant Skin Protectant wipes to Periwound 12/21/2019  8:00 AM   Dressing Options Roll Gauze;Mepilex 12/22/2019  6:00 PM   Dressing Cleansing/Solutions 1/4 Strength Dakin's Solution 12/22/2019  6:00 PM   Dressing Changed Changed 12/22/2019  6:00 PM   Dressing Status Clean;Dry;Intact 12/22/2019  6:00 PM   Dressing Change Frequency Every Shift 12/22/2019  6:00 PM   NEXT Dressing Change  12/23/19 12/22/2019  6:00 PM   NEXT Weekly Photo (Inpatient Only) 12/27/19 12/20/2019  9:18 PM   WOUND NURSE ONLY - Odor None 12/20/2019  4:00 PM   WOUND NURSE ONLY - Exposed Structures Bone;Muscle 12/20/2019  4:00 PM   WOUND NURSE ONLY - Tissue Type and Percentage red, dark puprle 12/20/2019  4:00 PM   WOUND NURSE ONLY - Time Spent with Patient (mins) 60 12/20/2019  4:00 PM       Wound 12/06/19 Partial Thickness Wound Toe (Comment which one) around left 3,4 th toes, and right big toe. not pressure (Active)   Wound Image   12/20/2019  9:18 PM   Site Assessment Black;Light purple;Clean;Dry 12/23/2019 10:00 AM   Raeann-wound Assessment Clean;Dry 12/23/2019  10:00 AM   Margins Attached edges 12/23/2019 10:00 AM   Closure Open to air 12/23/2019 10:00 AM   Drainage Amount None 12/22/2019  8:00 AM   Non-staged Wound Description Partial thickness 12/21/2019  8:00 AM   Cleansing Not Applicable 12/21/2019  8:00 AM   Periwound Protectant Not Applicable 12/21/2019  8:00 AM   Dressing Options Dry Gauze;Mepilex 12/23/2019 10:00 AM   Dressing Status Clean;Dry;Intact 12/23/2019 10:00 AM   Dressing Change Frequency As Needed 12/23/2019 10:00 AM   NEXT Weekly Photo (Inpatient Only) 12/25/19 12/22/2019  7:35 PM       Peripheral IV 12/11/19 20 G Left;Posterior Hand (Active)   Site Assessment Clean;Dry;Intact 12/23/2019 10:00 AM   Dressing Type Transparent 12/23/2019 10:00 AM   Line Status Scrubbed the hub prior to access;Flushed;Saline locked 12/23/2019 10:00 AM   Dressing Status Clean;Dry;Intact 12/23/2019 10:00 AM   Dressing Intervention N/A 12/23/2019 10:00 AM   Infiltration Grading ([facility], [facility]) 0 12/23/2019 10:00 AM   Phlebitis Scale ([facility] Only) 0 12/23/2019 10:00 AM       Peripheral IV 12/19/19 20 G Anterior;Left;Proximal Forearm (Active)   Site Assessment Clean;Dry;Intact 12/23/2019 10:00 AM   Dressing Type Transparent 12/23/2019 10:00 AM   Line Status Scrubbed the hub prior to access;Flushed;Saline locked 12/23/2019 10:00 AM   Dressing Status Clean;Dry;Intact 12/23/2019 10:00 AM   Dressing Intervention N/A 12/23/2019 10:00 AM   Infiltration Grading ([facility], [facility]) 0 12/23/2019 10:00 AM   Phlebitis Scale ([facility] Only) 0 12/23/2019 10:00 AM           Urethral Catheter (Active)   Site Assessment Clean;Skin intact 12/23/2019 10:00 AM   Collection Container Standard drainage bag 12/23/2019 10:00 AM   Urinary Catheter Care Tamper Evident Seal in Place;Drainage Tube Extended;Drainage Bag Not Overfilled;Drainage Tubing Properly Secured;Drainage Bag Below Bladder Level and Not on Floor;Cath Care Done with Soap & Water 12/23/2019 10:00 AM   Securement Method Securing device (Describe)  12/23/2019 10:00 AM   Output (mL) 300 mL 12/22/2019  4:00 PM        MENTAL STATUS ON TRANSFER  Level of Consciousness: Alert  Orientation : Oriented x 4  Speech: Speech Clear    CONSULTATIONS  Plastics  ID  Cards    PROCEDURES  Debridement at bedside  Coccygeal biopsy  IR drain in the coccyx area    CT-PELVIS WITH   Final Result      1.  Extensive bony destructive change of the sacrum and coccyx with a large decubitus ulcer eroding through the skin, sacrum and into the presacral soft tissues. There is again seen anterior displacement of the distal coccygeal remnant. No evidence of a    large abscess.      2.  No evidence of intrapelvic abscess.      3.  Left lower quadrant ostomy site.      CT-PELVIS WITH   Final Result      1.  Interval debridement decubitus ulcer with packing and pigtail catheter in place. No drainable fluid remains.   2.  Air tracking from the ulcer cavity between the right gluteus medius and gluteus rogelio muscles. No intramuscular abscess.   3.  No change in anterior displacement of the coccyx and sclerosis of S4 vertebral body, which may be due to chronic osteomyelitis.   4.  Nonobstructive left nephrolithiasis.      EC-ECHOCARDIOGRAM COMPLETE W/O CONT   Final Result      CT-DRAIN-PERITONEAL   Final Result      1. CT GUIDED PLACEMENT OF A PERCUTANEOUS DRAINAGE CATHETER IN A SACRAL SOFT TISSUE FLUID COLLECTION.   2.  THE CURRENT PLAN IS TO MONITOR DRAINAGE OUTPUT AND OBTAIN A FOLLOWUP CT SCAN IN 5-7 DAYS IF CLINICALLY INDICATED.      CT-NEEDLE BX-DEEP BONE   Final Result      CT GUIDED RIGHT SACRAL ALA BIOPSY.      IR-US GUIDED PIV   Final Result    Ultrasound-guided PERIPHERAL IV INSERTION performed by    qualified nursing staff as above.            CT-PELVIS WITH   Final Result      1.  Interval development of a 13 x 5 x 2 cm gas and fluid collection overlying the dorsal sacrococcygeal junction region where there used to be a large open decubitus ulcer. This suggests abscess formation with  presumed draining sinus      2.  Anteriorly displaced distal coccyx with new S4 partial sacral volume loss and sclerosis indicating osteomyelitis favored over interval partial sacrectomy      3.  For catheter placement diffuse bilateral thickening as before. This indicates chronic cystitis and/or outlet obstruction and there is some new depending calcification likely from urolith favored over bladder wall calcification      4.  Otherwise stable evaluation following left lower quadrant colostomy, no bowel obstruction. 8 mm nonobstructive left nephrolith. Severe atherosclerosis.            LABORATORY  Lab Results   Component Value Date    SODIUM 139 12/23/2019    POTASSIUM 3.8 12/23/2019    CHLORIDE 108 12/23/2019    CO2 27 12/23/2019    GLUCOSE 99 12/23/2019    BUN 13 12/23/2019    CREATININE 0.65 12/23/2019        Lab Results   Component Value Date    WBC 5.7 12/23/2019    HEMOGLOBIN 10.8 (L) 12/23/2019    HEMATOCRIT 33.8 (L) 12/23/2019    PLATELETCT 217 12/23/2019        Total time of the discharge process exceeds 34 minutes.

## 2019-12-24 NOTE — TELEPHONE ENCOUNTER
Dr. Merino,    This patient wasn't discharged until 12-23-19. I just want to make sure we are good to go to schedule the ablation or if you want to see him in clinic first?    Thank You,  Alexandria

## 2019-12-24 NOTE — TELEPHONE ENCOUNTER
Elías Merino M.D.  Alexandria Delcid, Med Ass't   Caller: Unspecified (1 week ago)             If he can come to clinic that'd be great, would want to confirm there are no more plans for him to have surgeries on his sacral wound

## 2019-12-26 NOTE — TELEPHONE ENCOUNTER
Spoke with the  at [facility]. This patient is scheduled to see Dr. Merino to discuss on 1-9-20 @ 10:00.

## 2019-12-31 ENCOUNTER — TELEPHONE (OUTPATIENT)
Dept: INFECTIOUS DISEASES | Facility: MEDICAL CENTER | Age: 69
End: 2019-12-31

## 2019-12-31 NOTE — TELEPHONE ENCOUNTER
Pt was discharged from [facility] late last week (12/26 or 12/27) and was not informed of appointment today. Called and LM with pt to reschedule appt.  Received a call back form pt stating he is admitted to [facility]. Advised to contact [facility] if any follow up with us is needed.  -AMP

## 2020-01-09 ENCOUNTER — TELEPHONE (OUTPATIENT)
Dept: CARDIOLOGY | Facility: MEDICAL CENTER | Age: 70
End: 2020-01-09

## 2020-01-09 ENCOUNTER — OFFICE VISIT (OUTPATIENT)
Dept: CARDIOLOGY | Facility: MEDICAL CENTER | Age: 70
End: 2020-01-09
Payer: MEDICARE

## 2020-01-09 VITALS
HEART RATE: 110 BPM | HEIGHT: 70 IN | DIASTOLIC BLOOD PRESSURE: 76 MMHG | SYSTOLIC BLOOD PRESSURE: 116 MMHG | BODY MASS INDEX: 26.48 KG/M2 | WEIGHT: 185 LBS | OXYGEN SATURATION: 96 %

## 2020-01-09 DIAGNOSIS — I48.4 ATYPICAL ATRIAL FLUTTER (HCC): ICD-10-CM

## 2020-01-09 LAB — EKG IMPRESSION: NORMAL

## 2020-01-09 PROCEDURE — 99215 OFFICE O/P EST HI 40 MIN: CPT | Performed by: INTERNAL MEDICINE

## 2020-01-09 PROCEDURE — 93000 ELECTROCARDIOGRAM COMPLETE: CPT | Performed by: INTERNAL MEDICINE

## 2020-01-09 RX ORDER — AMLODIPINE BESYLATE 5 MG/1
10 TABLET ORAL DAILY
Status: ON HOLD | COMMUNITY
End: 2020-12-23

## 2020-01-09 RX ORDER — FLECAINIDE ACETATE 50 MG/1
TABLET ORAL
COMMUNITY
Start: 2017-12-07 | End: 2020-12-18

## 2020-01-09 RX ORDER — METOPROLOL SUCCINATE 25 MG/1
TABLET, EXTENDED RELEASE ORAL
COMMUNITY
Start: 2017-12-07 | End: 2020-12-18

## 2020-01-09 RX ORDER — LISINOPRIL 40 MG/1
TABLET ORAL
COMMUNITY
Start: 2017-12-07 | End: 2020-12-18

## 2020-01-09 NOTE — TELEPHONE ENCOUNTER
Patient scheduled for flutter ablation w/[facility] on 2-25-20 at [facility] with Dr. Merino.

## 2020-01-09 NOTE — PROGRESS NOTES
Arrhythmia Clinic Note (Established patient)    DOS: 1/9/2020    Chief complaint/Reason for consult: Atrial flutter    Interval History: Seen in the hospital for atrial flutter.  He is recovering from his surgery for sacral decubitus ulcers.  He had been planned for ablation in the past but this had been deferred due to surgeries for his sacral ulcers.  He wishes to come off anticoagulation, which would be reasonable following an atrial flutter ablation.  Denies any new symptoms. Seeing a surgeon tomorrow for followup to see if he needs another surgery.    ROS (+ highlighted in bold):  Constitutional: Fevers/chills/fatigue/weightloss  HEENT: Blurry vision/eye pain/sore throat/hearing loss  Respiratory: Shortness of breath/cough  Cardiovascular: Chest pain/palpitations/edema/orthopnea/syncope  GI: Nausea/vomitting/diarrhea  MSK: Arthralgias/myagias/muscle weakness  Skin: Rash/sores  Neurological: Numbness/tremors/vertigo  Endocrine: Excessive thirst/polyuria/cold intolerance/heat intolerance  Psych: Depression/anxiety    Past Medical History:   Diagnosis Date   • A-fib (HCC)    • Atrial flutter (HCC)    • GERD (gastroesophageal reflux disease)    • Hypertension    • Neurogenic bladder    • Quadriplegia, C5-C7 complete (HCC)        Past Surgical History:   Procedure Laterality Date   • ULCER DEBRIDEMENT N/A 8/21/2019    Procedure: debridement of Sacral grade 4 ulcer - W/BONE BIOPSY, 3 liter wash out. bilateral sliding gluteal myocutaneous flap advancement;  Surgeon: Amadeo Moon M.D.;  Location: [facility];  Service: Plastics   • FLAP CLOSURE  8/21/2019    Procedure: CLOSURE, FLAP - MUSCLE;  Surgeon: Amadeo Moon M.D.;  Location: [facility];  Service: Plastics   • COLOSTOMY N/A 7/27/2019    Procedure: CREATION, COLOSTOMY -  placement;  Surgeon: Elías Hannah M.D.;  Location: [facility];  Service: General   • COLOSTOMY     • COLOSTOMY TAKEDOWN     • HERNIA REPAIR      • PERCUTANEOUOSPINNING LOWER EXTREMITY         Social History     Socioeconomic History   • Marital status:      Spouse name: Not on file   • Number of children: Not on file   • Years of education: Not on file   • Highest education level: Not on file   Occupational History   • Not on file   Social Needs   • Financial resource strain: Not on file   • Food insecurity:     Worry: Never true     Inability: Never true   • Transportation needs:     Medical: No     Non-medical: No   Tobacco Use   • Smoking status: Former Smoker     Packs/day: 1.00     Types: Cigarettes     Start date: 1967     Last attempt to quit: 1977     Years since quittin.0   • Smokeless tobacco: Never Used   Substance and Sexual Activity   • Alcohol use: No     Frequency: Never     Binge frequency: Never   • Drug use: No   • Sexual activity: Not on file   Lifestyle   • Physical activity:     Days per week: Not on file     Minutes per session: Not on file   • Stress: Not on file   Relationships   • Social connections:     Talks on phone: Not on file     Gets together: Not on file     Attends Alevism service: Not on file     Active member of club or organization: Not on file     Attends meetings of clubs or organizations: Not on file     Relationship status: Not on file   • Intimate partner violence:     Fear of current or ex partner: Not on file     Emotionally abused: Not on file     Physically abused: Not on file     Forced sexual activity: Not on file   Other Topics Concern   • Not on file   Social History Narrative   • Not on file       Family History   Problem Relation Age of Onset   • Heart Disease Father        Allergies   Allergen Reactions   • Sulfa Drugs Rash     Rash         Current Outpatient Medications   Medication Sig Dispense Refill   • metoprolol SR (TOPROL XL) 25 MG TABLET SR 24 HR      • lisinopril (PRINIVIL) 40 MG tablet      • flecainide (TAMBOCOR) 50 MG tablet      • amLODIPine (NORVASC) 5 MG Tab      •  "nystatin (MYCOSTATIN) 809860 UNIT/ML Suspension      • aspirin EC (ECOTRIN) 81 MG Tablet Delayed Response Take 81 mg by mouth.     • dakins 0.125%, 1/4 strength, 0.125 % Solution Apply 10 mL to affected area(s) 2 Times a Day. 1 Bottle 1   • Nutritional Supplements (LPS CRITICAL CARE SUGAR FREE) Liquid Take 17 g by mouth every day.     • sennosides (SENOKOT) 8.6 MG Tab Take 17.2 mg by mouth every day.     • rivaroxaban (XARELTO) 10 MG Tab tablet Take 10 mg by mouth with dinner.     • ferrous sulfate 325 (65 Fe) MG tablet Take 325 mg by mouth 2 Times a Day.     • traZODone (DESYREL) 50 MG Tab Take 50 mg by mouth every evening.     • docusate sodium (COLACE) 100 MG Cap Take 100 mg by mouth 2 times a day.     • baclofen (LIORESAL) 10 MG Tab Take 10 mg by mouth 4 times a day.     • finasteride (PROSCAR) 5 MG Tab Take 5 mg by mouth every day.     • losartan (COZAAR) 25 MG Tab Take 25 mg by mouth every day. Hold HTN meds for SBP <100 or DBP <65     • therapeutic multivitamin-minerals (THERAGRAN-M) Tab Take 1 Tab by mouth every day.       No current facility-administered medications for this visit.        Physical Exam:  Vitals:    01/09/20 1001   BP: 116/76   BP Location: Left arm   Patient Position: Sitting   BP Cuff Size: Adult   Pulse: (!) 110   SpO2: 96%   Weight: 83.9 kg (185 lb)   Height: 1.778 m (5' 10\")     General appearance: NAD, conversant   Eyes: anicteric sclerae, moist conjunctivae; no lid-lag; PERRLA  HENT: Atraumatic; oropharynx clear with moist mucous membranes and no mucosal ulcerations; normal hard and soft palate  Neck: Trachea midline; FROM, supple, no thyromegaly or lymphadenopathy  Lungs: CTA, with normal respiratory effort and no intercostal retractions  CV: Tachycardic, irregular, no MRGs, no JVD  Abdomen: Soft, non-tender; no masses or HSM  Extremities: No peripheral edema or extremity lymphadenopathy  Skin: Normal temperature, turgor and texture; no rash, ulcers or subcutaneous nodules  Psych: " Appropriate affect, alert and oriented to person, place and time    Data:  Lipids:   No results found for: CHOLSTRLTOT, TRIGLYCERIDE, HDL, LDL     BMP:  Lab Results   Component Value Date/Time    SODIUM 139 12/23/2019 0040    POTASSIUM 3.8 12/23/2019 0040    CHLORIDE 108 12/23/2019 0040    CO2 27 12/23/2019 0040    GLUCOSE 99 12/23/2019 0040    BUN 13 12/23/2019 0040    CREATININE 0.65 12/23/2019 0040    CALCIUM 8.9 12/23/2019 0040    ANION 6.0 12/18/2019 0314        TSH:   Lab Results   Component Value Date/Time    TSHULTRASEN 0.350 (L) 12/09/2019 1129        THYROXINE (T4):   No results found for: FREEDIR     CBC:   Lab Results   Component Value Date/Time    WBC 5.7 12/23/2019 12:40 AM    RBC 3.59 (L) 12/23/2019 12:40 AM    HEMOGLOBIN 10.8 (L) 12/23/2019 12:40 AM    HEMATOCRIT 33.8 (L) 12/23/2019 12:40 AM    MCV 94.2 12/23/2019 12:40 AM    MCH 30.1 12/23/2019 12:40 AM    MCHC 32.0 (L) 12/23/2019 12:40 AM    RDW 54.3 (H) 12/23/2019 12:40 AM    PLATELETCT 217 12/23/2019 12:40 AM    MPV 8.6 (L) 12/23/2019 12:40 AM    NEUTSPOLYS 63.90 12/07/2019 01:43 AM    LYMPHOCYTES 21.30 (L) 12/07/2019 01:43 AM    MONOCYTES 10.50 12/07/2019 01:43 AM    EOSINOPHILS 3.50 12/07/2019 01:43 AM    BASOPHILS 0.50 12/07/2019 01:43 AM    IMMGRAN 0.30 12/07/2019 01:43 AM    NRBC 0.00 12/07/2019 01:43 AM    NEUTS 3.67 12/07/2019 01:43 AM    LYMPHS 2 12/08/2019 05:35 PM    MONOS 0.60 12/07/2019 01:43 AM    EOS 0.20 12/07/2019 01:43 AM    BASO 0.03 12/07/2019 01:43 AM    IMMGRANAB 0.02 12/07/2019 01:43 AM    NRBCAB 0.00 12/07/2019 01:43 AM        CBC w/o DIFF  Lab Results   Component Value Date/Time    WBC 5.7 12/23/2019 12:40 AM    RBC 3.59 (L) 12/23/2019 12:40 AM    HEMOGLOBIN 10.8 (L) 12/23/2019 12:40 AM    MCV 94.2 12/23/2019 12:40 AM    MCH 30.1 12/23/2019 12:40 AM    MCHC 32.0 (L) 12/23/2019 12:40 AM    RDW 54.3 (H) 12/23/2019 12:40 AM    MPV 8.6 (L) 12/23/2019 12:40 AM       Prior echo/stress reviewed: EF 60%    EKG interpreted by me:  Typical appearing atrial flutter    Impression/Plan:  1.  Persistent atrial flutter.  Likely typical.  We discussed ablation in the hospital, and he is still interested in pursuing this. Risks include vascular access bleeding or pain, infection, stroke, myocardial infarction, cardiac tamponade, pericardial effusion, myocardial perforation, major bleeding, phrenic nerve damage, heart block requiring a pacemaker, and death. Risk of major adverse event is ~1%. Success rates long term are 90-95% depending on the arrhythmia induced and the acute success in the EP lab.  He understands the risks and benefits and wishes to proceed.    He is only on 10mg Xarelto, per report because of bleeding issues. Will do RICHARD prior to ablation, and increase to 20mg for 4 weeks following ablation, then discontinue.    Start metoprolol 25mg PO BID for better rate control leading up to ablation, DC after ablation.    Schedule EP study and ablation with RICHARD next available with anesthesia.      Elías Merino MD  Cardiac Electrophysiology

## 2020-01-10 ENCOUNTER — HOSPITAL ENCOUNTER (OUTPATIENT)
Facility: MEDICAL CENTER | Age: 70
End: 2020-01-10
Payer: MEDICARE

## 2020-01-24 NOTE — PROGRESS NOTES
"Report taken from day shift RN. Assumed patient care at 1900. Fall precautions in place and assessment completed. Patient vital signs /74   Pulse 76   Temp 37.3 °C (99.2 °F) (Temporal)   Resp 18   Ht 1.778 m (5' 10\")   Wt 84.1 kg (185 lb 6.5 oz)   SpO2 97%   BMI 26.60 kg/m² . Will continue to monitor.   " Additional Safety/Bands:

## 2020-02-17 NOTE — PROGRESS NOTES
Phone call received from interventional radiology. Unable to complete needle biopsy today due to pending CT scan. Will plan for tomorrow AM. Patient needs to be NPO at midnight.   Initial (On Arrival)

## 2020-02-20 NOTE — TELEPHONE ENCOUNTER
Recvd call from patient. He needs to be scheduled for a pressure wound closure and will have to come off of his blood thinners for that procedure. He would like to cancel this ablation at this time. He will call me back once the wound closure is scheduled to reschedule the ablation. FYJEFFRY to Dr. Merino.

## 2020-07-21 ENCOUNTER — HOSPITAL ENCOUNTER (OUTPATIENT)
Facility: MEDICAL CENTER | Age: 70
End: 2020-07-21
Attending: NURSE PRACTITIONER
Payer: MEDICARE

## 2020-07-21 PROCEDURE — 81001 URINALYSIS AUTO W/SCOPE: CPT

## 2020-07-21 PROCEDURE — 87086 URINE CULTURE/COLONY COUNT: CPT

## 2020-07-21 PROCEDURE — 87077 CULTURE AEROBIC IDENTIFY: CPT

## 2020-07-21 PROCEDURE — 87186 SC STD MICRODIL/AGAR DIL: CPT

## 2020-07-22 LAB
APPEARANCE UR: ABNORMAL
BACTERIA #/AREA URNS HPF: ABNORMAL /HPF
BILIRUB UR QL STRIP.AUTO: NEGATIVE
COLOR UR: YELLOW
GLUCOSE UR STRIP.AUTO-MCNC: NEGATIVE MG/DL
KETONES UR STRIP.AUTO-MCNC: NEGATIVE MG/DL
LEUKOCYTE ESTERASE UR QL STRIP.AUTO: ABNORMAL
MICRO URNS: ABNORMAL
NITRITE UR QL STRIP.AUTO: POSITIVE
PH UR STRIP.AUTO: 6.5 [PH] (ref 5–8)
PROT UR QL STRIP: 100 MG/DL
RBC # URNS HPF: ABNORMAL /HPF
RBC UR QL AUTO: ABNORMAL
SP GR UR STRIP.AUTO: 1.02
WBC #/AREA URNS HPF: ABNORMAL /HPF

## 2020-07-24 LAB
BACTERIA UR CULT: ABNORMAL
BACTERIA UR CULT: ABNORMAL
SIGNIFICANT IND 70042: ABNORMAL
SITE SITE: ABNORMAL
SOURCE SOURCE: ABNORMAL

## 2020-10-13 ENCOUNTER — HOSPITAL ENCOUNTER (EMERGENCY)
Facility: MEDICAL CENTER | Age: 70
End: 2020-10-13
Attending: EMERGENCY MEDICINE
Payer: MEDICARE

## 2020-10-13 VITALS
HEART RATE: 60 BPM | HEIGHT: 70 IN | DIASTOLIC BLOOD PRESSURE: 70 MMHG | BODY MASS INDEX: 27.92 KG/M2 | OXYGEN SATURATION: 94 % | WEIGHT: 195 LBS | RESPIRATION RATE: 18 BRPM | TEMPERATURE: 99.8 F | SYSTOLIC BLOOD PRESSURE: 125 MMHG

## 2020-10-13 DIAGNOSIS — T83.9XXA FOLEY CATHETER PROBLEM, INITIAL ENCOUNTER (HCC): ICD-10-CM

## 2020-10-13 PROCEDURE — 51702 INSERT TEMP BLADDER CATH: CPT

## 2020-10-13 PROCEDURE — A9270 NON-COVERED ITEM OR SERVICE: HCPCS | Performed by: EMERGENCY MEDICINE

## 2020-10-13 PROCEDURE — 700102 HCHG RX REV CODE 250 W/ 637 OVERRIDE(OP): Performed by: EMERGENCY MEDICINE

## 2020-10-13 PROCEDURE — 303105 HCHG CATHETER EXTRA

## 2020-10-13 PROCEDURE — 99284 EMERGENCY DEPT VISIT MOD MDM: CPT

## 2020-10-13 RX ORDER — CIPROFLOXACIN 250 MG/1
250 TABLET, FILM COATED ORAL 2 TIMES DAILY
Qty: 6 TAB | Refills: 0 | Status: SHIPPED | OUTPATIENT
Start: 2020-10-13 | End: 2020-10-16

## 2020-10-13 RX ORDER — CIPROFLOXACIN 250 MG/1
250 TABLET, FILM COATED ORAL ONCE
Status: COMPLETED | OUTPATIENT
Start: 2020-10-13 | End: 2020-10-13

## 2020-10-13 RX ADMIN — CIPROFLOXACIN 250 MG: 250 TABLET ORAL at 20:03

## 2020-10-13 SDOH — HEALTH STABILITY: MENTAL HEALTH: HOW OFTEN DO YOU HAVE 6 OR MORE DRINKS ON ONE OCCASION?: NOT ASKED

## 2020-10-13 SDOH — HEALTH STABILITY: MENTAL HEALTH: HOW OFTEN DO YOU HAVE A DRINK CONTAINING ALCOHOL?: NOT ASKED

## 2020-10-13 ASSESSMENT — FIBROSIS 4 INDEX: FIB4 SCORE: 1.08

## 2020-10-14 NOTE — ED NOTES
Discharge Instructions given to pt with verbalized understanding.    Report given to [facility] transport team.  Pt transported to [facility] home at this time.

## 2020-10-14 NOTE — ED PROVIDER NOTES
"ED Provider Note    CHIEF COMPLAINT  Chief Complaint   Patient presents with   • Urinary Catheter Problem       HPI  Osvaldo Pate is a 70 y.o. incomplete quadriplegic male with history of A. fib/flutter on Xarelto, hypertension, and GERD who presents complaining of issue with his Cazares catheter.    Patient states he believes his Cazares may have been pulled on and dislodged during a transfer this morning.  He has noted some blood in the bag and not much urine since this occurred.      ALLERGIES  Allergies   Allergen Reactions   • Sulfa Drugs Rash     Rash         CURRENT MEDICATIONS  Xarelto    PAST MEDICAL HISTORY   has a past medical history of A-fib (HCC), Atrial flutter (HCC), GERD (gastroesophageal reflux disease), Hypertension, Neurogenic bladder, and Quadriplegia, C5-C7 complete (HCC).    SURGICAL HISTORY   has a past surgical history that includes percutaneouospinning lower extremity; colostomy; colostomy takedown; colostomy (N/A, 2019); ulcer debridement (N/A, 2019); flap closure (2019); and hernia repair.    SOCIAL HISTORY  Social History     Tobacco Use   • Smoking status: Former Smoker     Packs/day: 1.00     Types: Cigarettes     Start date: 1967     Quit date: 1977     Years since quittin.8   • Smokeless tobacco: Never Used   Substance and Sexual Activity   • Alcohol use: Yes   • Drug use: No   • Sexual activity: Not on file       REVIEW OF SYSTEMS  Patient is to Cazares catheter issue with hematuria   pt denies fever, chills, pain  See HPI for further details.       PHYSICAL EXAM  VITAL SIGNS: /70   Pulse 60   Temp 37.7 °C (99.8 °F) (Temporal)   Resp 18   Ht 1.778 m (5' 10\")   Wt 88.5 kg (195 lb)   SpO2 94%   BMI 27.98 kg/m²     General:  WDWN, nontoxic appearing in NAD; A+Ox3; V/S as above; hypertensive at triage, not on my exam  Skin: warm and dry; good color; no rash  HEENT: NCAT; EOMs intact; PERRL; no scleral icterus   Neck: FROM  Abdomen: BS present; " ileostomy bag present; soft; NTND; no rebound, guarding, or rigidity.  No organomegaly or pulsatile mass  : Cazares in place with no blood at the meatus; the shaft of the penis is not swollen  Extremities: Moving upper extremities  Neurologic: CNs III-XII grossly intact; speech clear  Psychiatric: Appropriate affect, normal mood    MEDICAL RECORD  I have reviewed the patient's medical record and pertinent results as listed.      COURSE & MEDICAL DECISION MAKING  I have reviewed any medical record information, laboratory studies and radiographic results as noted.    Appropriate PPE was worn at all times while interacting with the patient, including goggles, N95 mask, and surgical mask.    Osvaldo Pate is a 70 y.o. male who presents complaining of Cazares catheter issue.  The patient's Cazares catheter was removed and replaced by the nursing staff prior to my seeing the patient.  The patient's hypertension is likely due to urinary retention/autonomic dysfunction related to quadriplegia.    I discussed the case with Dr. Lange from urology.  He recommended covering the patient with prophylactic antibiotics for several days.  The patient is allergic to Bactrim.  I have reviewed the patient's prior urine culture results which demonstrated Pseudomonas.  I will place him on Cipro and have given him a dose here prior to discharge.  Dr. Lange also recommended no changing of the catheter for 1 month to allow a possible urethral injury to heal.  Patient was advised of this plan and to follow-up with Dr. Lange as an outpatient if needed and to return to the ER for fever, vomiting, or other concerns.    Spurious hypotension noted, likely due to catheter placement/urinary drainage.  This normalized later in the ER visit.  I do not suspect sepsis or hypovolemia.      FINAL IMPRESSION  1. Cazaers catheter problem, initial encounter (HCC)         Electronically signed by: Magdalena Boogie M.D., 10/13/2020 7:24 PM

## 2020-10-14 NOTE — DISCHARGE INSTRUCTIONS
Take Cipro as directed to prevent urinary tract infection    Follow-up with Dr. Lange at urology [place] as needed    Do not replace the Cazares until 1 month from now

## 2020-10-14 NOTE — ED TRIAGE NOTES
Osvaldo Pate  70 y.o.  Chief Complaint   Patient presents with   • Urinary Catheter Problem     Pt [facility] from his group home.  Pt is an incomplete paraplegic and has a hutchins in place.  Pt states he things his hutchins may have accidentally been pulled on with a transfer this morning.  Pt reports blood in the hutchins tubing and not much urine.    No interventions by EMS PTA.    On arrival to the ER, pt is awake and pleasant.  Hutchins noted to have bloody urine in tubing and bag.  Hutchins removed and new hutchins placed.  Large amount of clear, yellow urine obtained. Pt tolerated well.     Chart up for ERP.

## 2020-11-18 ENCOUNTER — HOSPITAL ENCOUNTER (OUTPATIENT)
Facility: MEDICAL CENTER | Age: 70
End: 2020-11-18
Attending: NURSE PRACTITIONER
Payer: MEDICARE

## 2020-11-18 LAB
APPEARANCE UR: CLEAR
BACTERIA #/AREA URNS HPF: ABNORMAL /HPF
BILIRUB UR QL STRIP.AUTO: NEGATIVE
COLOR UR: YELLOW
EPI CELLS #/AREA URNS HPF: ABNORMAL /HPF
GLUCOSE UR STRIP.AUTO-MCNC: NEGATIVE MG/DL
KETONES UR STRIP.AUTO-MCNC: NEGATIVE MG/DL
LEUKOCYTE ESTERASE UR QL STRIP.AUTO: ABNORMAL
MICRO URNS: ABNORMAL
NITRITE UR QL STRIP.AUTO: NEGATIVE
PH UR STRIP.AUTO: 6 [PH] (ref 5–8)
PROT UR QL STRIP: NEGATIVE MG/DL
RBC # URNS HPF: ABNORMAL /HPF
RBC UR QL AUTO: ABNORMAL
SP GR UR STRIP.AUTO: 1.02
WBC #/AREA URNS HPF: ABNORMAL /HPF

## 2020-11-18 PROCEDURE — 87077 CULTURE AEROBIC IDENTIFY: CPT | Mod: 91

## 2020-11-18 PROCEDURE — 87086 URINE CULTURE/COLONY COUNT: CPT

## 2020-11-18 PROCEDURE — 87186 SC STD MICRODIL/AGAR DIL: CPT | Mod: 91

## 2020-11-18 PROCEDURE — 81001 URINALYSIS AUTO W/SCOPE: CPT

## 2020-11-21 LAB
BACTERIA UR CULT: ABNORMAL
SIGNIFICANT IND 70042: ABNORMAL
SITE SITE: ABNORMAL
SOURCE SOURCE: ABNORMAL

## 2020-12-18 ENCOUNTER — HOSPITAL ENCOUNTER (INPATIENT)
Facility: MEDICAL CENTER | Age: 70
LOS: 1 days | DRG: 593 | End: 2020-12-19
Attending: EMERGENCY MEDICINE | Admitting: INTERNAL MEDICINE
Payer: MEDICARE

## 2020-12-18 ENCOUNTER — APPOINTMENT (OUTPATIENT)
Dept: RADIOLOGY | Facility: MEDICAL CENTER | Age: 70
DRG: 593 | End: 2020-12-18
Attending: EMERGENCY MEDICINE
Payer: MEDICARE

## 2020-12-18 PROBLEM — L89.90 DECUBITUS ULCER: Status: ACTIVE | Noted: 2020-12-18

## 2020-12-18 LAB
ALBUMIN SERPL BCP-MCNC: 3.2 G/DL (ref 3.2–4.9)
ALBUMIN/GLOB SERPL: 0.8 G/DL
ALP SERPL-CCNC: 74 U/L (ref 30–99)
ALT SERPL-CCNC: 5 U/L (ref 2–50)
ANION GAP SERPL CALC-SCNC: 12 MMOL/L (ref 7–16)
AST SERPL-CCNC: 8 U/L (ref 12–45)
BASOPHILS # BLD AUTO: 0.3 % (ref 0–1.8)
BASOPHILS # BLD: 0.02 K/UL (ref 0–0.12)
BILIRUB SERPL-MCNC: 0.3 MG/DL (ref 0.1–1.5)
BUN SERPL-MCNC: 11 MG/DL (ref 8–22)
CALCIUM SERPL-MCNC: 9 MG/DL (ref 8.4–10.2)
CHLORIDE SERPL-SCNC: 101 MMOL/L (ref 96–112)
CO2 SERPL-SCNC: 22 MMOL/L (ref 20–33)
CREAT SERPL-MCNC: 0.52 MG/DL (ref 0.5–1.4)
CRP SERPL HS-MCNC: 4.53 MG/DL (ref 0–0.75)
EOSINOPHIL # BLD AUTO: 0.2 K/UL (ref 0–0.51)
EOSINOPHIL NFR BLD: 2.6 % (ref 0–6.9)
ERYTHROCYTE [DISTWIDTH] IN BLOOD BY AUTOMATED COUNT: 48.8 FL (ref 35.9–50)
ERYTHROCYTE [SEDIMENTATION RATE] IN BLOOD BY WESTERGREN METHOD: 71 MM/HOUR (ref 0–20)
GLOBULIN SER CALC-MCNC: 3.9 G/DL (ref 1.9–3.5)
GLUCOSE SERPL-MCNC: 93 MG/DL (ref 65–99)
HCT VFR BLD AUTO: 35.2 % (ref 42–52)
HGB BLD-MCNC: 11.7 G/DL (ref 14–18)
IMM GRANULOCYTES # BLD AUTO: 0.05 K/UL (ref 0–0.11)
IMM GRANULOCYTES NFR BLD AUTO: 0.6 % (ref 0–0.9)
LYMPHOCYTES # BLD AUTO: 1.61 K/UL (ref 1–4.8)
LYMPHOCYTES NFR BLD: 20.7 % (ref 22–41)
MCH RBC QN AUTO: 32.1 PG (ref 27–33)
MCHC RBC AUTO-ENTMCNC: 33.2 G/DL (ref 33.7–35.3)
MCV RBC AUTO: 96.4 FL (ref 81.4–97.8)
MONOCYTES # BLD AUTO: 0.98 K/UL (ref 0–0.85)
MONOCYTES NFR BLD AUTO: 12.6 % (ref 0–13.4)
NEUTROPHILS # BLD AUTO: 4.9 K/UL (ref 1.82–7.42)
NEUTROPHILS NFR BLD: 63.2 % (ref 44–72)
NRBC # BLD AUTO: 0 K/UL
NRBC BLD-RTO: 0 /100 WBC
PLATELET # BLD AUTO: 259 K/UL (ref 164–446)
PMV BLD AUTO: 8.5 FL (ref 9–12.9)
POTASSIUM SERPL-SCNC: 4.4 MMOL/L (ref 3.6–5.5)
PROCALCITONIN SERPL-MCNC: 0.31 NG/ML
PROT SERPL-MCNC: 7.1 G/DL (ref 6–8.2)
RBC # BLD AUTO: 3.65 M/UL (ref 4.7–6.1)
SODIUM SERPL-SCNC: 135 MMOL/L (ref 135–145)
WBC # BLD AUTO: 7.8 K/UL (ref 4.8–10.8)

## 2020-12-18 PROCEDURE — 87205 SMEAR GRAM STAIN: CPT

## 2020-12-18 PROCEDURE — A9270 NON-COVERED ITEM OR SERVICE: HCPCS | Performed by: INTERNAL MEDICINE

## 2020-12-18 PROCEDURE — 700111 HCHG RX REV CODE 636 W/ 250 OVERRIDE (IP): Performed by: EMERGENCY MEDICINE

## 2020-12-18 PROCEDURE — 87077 CULTURE AEROBIC IDENTIFY: CPT

## 2020-12-18 PROCEDURE — 96365 THER/PROPH/DIAG IV INF INIT: CPT

## 2020-12-18 PROCEDURE — 99285 EMERGENCY DEPT VISIT HI MDM: CPT

## 2020-12-18 PROCEDURE — 96367 TX/PROPH/DG ADDL SEQ IV INF: CPT

## 2020-12-18 PROCEDURE — 700102 HCHG RX REV CODE 250 W/ 637 OVERRIDE(OP): Performed by: INTERNAL MEDICINE

## 2020-12-18 PROCEDURE — 96366 THER/PROPH/DIAG IV INF ADDON: CPT

## 2020-12-18 PROCEDURE — 99219 PR INITIAL OBSERVATION CARE,LEVL II: CPT | Performed by: INTERNAL MEDICINE

## 2020-12-18 PROCEDURE — 87070 CULTURE OTHR SPECIMN AEROBIC: CPT

## 2020-12-18 PROCEDURE — 86140 C-REACTIVE PROTEIN: CPT

## 2020-12-18 PROCEDURE — 700105 HCHG RX REV CODE 258: Performed by: INTERNAL MEDICINE

## 2020-12-18 PROCEDURE — 84145 PROCALCITONIN (PCT): CPT

## 2020-12-18 PROCEDURE — 80053 COMPREHEN METABOLIC PANEL: CPT

## 2020-12-18 PROCEDURE — 87186 SC STD MICRODIL/AGAR DIL: CPT

## 2020-12-18 PROCEDURE — 85025 COMPLETE CBC W/AUTO DIFF WBC: CPT

## 2020-12-18 PROCEDURE — 700105 HCHG RX REV CODE 258: Performed by: EMERGENCY MEDICINE

## 2020-12-18 PROCEDURE — 700111 HCHG RX REV CODE 636 W/ 250 OVERRIDE (IP): Performed by: INTERNAL MEDICINE

## 2020-12-18 PROCEDURE — 72193 CT PELVIS W/DYE: CPT

## 2020-12-18 PROCEDURE — G0378 HOSPITAL OBSERVATION PER HR: HCPCS

## 2020-12-18 PROCEDURE — 700117 HCHG RX CONTRAST REV CODE 255: Performed by: EMERGENCY MEDICINE

## 2020-12-18 PROCEDURE — 85652 RBC SED RATE AUTOMATED: CPT

## 2020-12-18 PROCEDURE — 0241U HCHG SARS-COV-2 COVID-19 NFCT DS RESP RNA 4 TRGT MIC: CPT

## 2020-12-18 RX ORDER — FLUTICASONE PROPIONATE 50 MCG
2 SPRAY, SUSPENSION (ML) NASAL DAILY
Status: DISCONTINUED | OUTPATIENT
Start: 2020-12-19 | End: 2020-12-19 | Stop reason: HOSPADM

## 2020-12-18 RX ORDER — FLUTICASONE PROPIONATE 50 MCG
2 SPRAY, SUSPENSION (ML) NASAL
COMMUNITY
End: 2021-10-23

## 2020-12-18 RX ORDER — POLYETHYLENE GLYCOL 3350 17 G/17G
1 POWDER, FOR SOLUTION ORAL
Status: DISCONTINUED | OUTPATIENT
Start: 2020-12-18 | End: 2020-12-19 | Stop reason: HOSPADM

## 2020-12-18 RX ORDER — LOSARTAN POTASSIUM 25 MG/1
50 TABLET ORAL
Status: DISCONTINUED | OUTPATIENT
Start: 2020-12-18 | End: 2020-12-19 | Stop reason: HOSPADM

## 2020-12-18 RX ORDER — ASCORBIC ACID 500 MG
500 TABLET ORAL
COMMUNITY
End: 2021-12-15

## 2020-12-18 RX ORDER — OXYCODONE HYDROCHLORIDE 10 MG/1
10 TABLET ORAL
Status: DISCONTINUED | OUTPATIENT
Start: 2020-12-18 | End: 2020-12-19 | Stop reason: HOSPADM

## 2020-12-18 RX ORDER — BACLOFEN 10 MG/1
20 TABLET ORAL 4 TIMES DAILY
Status: DISCONTINUED | OUTPATIENT
Start: 2020-12-18 | End: 2020-12-19 | Stop reason: HOSPADM

## 2020-12-18 RX ORDER — AMLODIPINE BESYLATE 5 MG/1
10 TABLET ORAL DAILY
Status: DISCONTINUED | OUTPATIENT
Start: 2020-12-19 | End: 2020-12-19 | Stop reason: HOSPADM

## 2020-12-18 RX ORDER — AMOXICILLIN 250 MG
2 CAPSULE ORAL 2 TIMES DAILY
Status: DISCONTINUED | OUTPATIENT
Start: 2020-12-18 | End: 2020-12-19 | Stop reason: HOSPADM

## 2020-12-18 RX ORDER — POLYETHYLENE GLYCOL 3350 17 G/17G
17 POWDER, FOR SOLUTION ORAL
Status: ON HOLD | COMMUNITY
End: 2022-11-22

## 2020-12-18 RX ORDER — ACETAMINOPHEN 500 MG
1000 TABLET ORAL SEE ADMIN INSTRUCTIONS
COMMUNITY
End: 2022-09-11

## 2020-12-18 RX ORDER — FERROUS SULFATE 325(65) MG
325 TABLET ORAL 2 TIMES DAILY
Status: DISCONTINUED | OUTPATIENT
Start: 2020-12-18 | End: 2020-12-19 | Stop reason: HOSPADM

## 2020-12-18 RX ORDER — ONDANSETRON 2 MG/ML
4 INJECTION INTRAMUSCULAR; INTRAVENOUS EVERY 4 HOURS PRN
Status: DISCONTINUED | OUTPATIENT
Start: 2020-12-18 | End: 2020-12-19 | Stop reason: HOSPADM

## 2020-12-18 RX ORDER — ONDANSETRON 4 MG/1
4 TABLET, ORALLY DISINTEGRATING ORAL EVERY 4 HOURS PRN
Status: DISCONTINUED | OUTPATIENT
Start: 2020-12-18 | End: 2020-12-19 | Stop reason: HOSPADM

## 2020-12-18 RX ORDER — BISACODYL 10 MG
10 SUPPOSITORY, RECTAL RECTAL
Status: DISCONTINUED | OUTPATIENT
Start: 2020-12-18 | End: 2020-12-19 | Stop reason: HOSPADM

## 2020-12-18 RX ORDER — ACETAMINOPHEN 325 MG/1
650 TABLET ORAL EVERY 6 HOURS PRN
Status: DISCONTINUED | OUTPATIENT
Start: 2020-12-18 | End: 2020-12-19 | Stop reason: HOSPADM

## 2020-12-18 RX ORDER — MAGNESIUM OXIDE 400 MG/1
400 TABLET ORAL DAILY
Status: ON HOLD | COMMUNITY
End: 2022-01-31

## 2020-12-18 RX ORDER — MORPHINE SULFATE 4 MG/ML
4 INJECTION, SOLUTION INTRAMUSCULAR; INTRAVENOUS
Status: DISCONTINUED | OUTPATIENT
Start: 2020-12-18 | End: 2020-12-19 | Stop reason: HOSPADM

## 2020-12-18 RX ORDER — CHOLECALCIFEROL (VITAMIN D3) 50 MCG
2000 TABLET ORAL DAILY
COMMUNITY
End: 2022-04-22

## 2020-12-18 RX ORDER — OXYCODONE HYDROCHLORIDE 5 MG/1
5 TABLET ORAL
Status: DISCONTINUED | OUTPATIENT
Start: 2020-12-18 | End: 2020-12-19 | Stop reason: HOSPADM

## 2020-12-18 RX ORDER — TRAZODONE HYDROCHLORIDE 50 MG/1
50 TABLET ORAL NIGHTLY PRN
Status: DISCONTINUED | OUTPATIENT
Start: 2020-12-18 | End: 2020-12-19 | Stop reason: HOSPADM

## 2020-12-18 RX ADMIN — SENNOSIDES-DOCUSATE SODIUM TAB 8.6-50 MG 2 TABLET: 8.6-5 TAB at 22:15

## 2020-12-18 RX ADMIN — IOHEXOL 100 ML: 350 INJECTION, SOLUTION INTRAVENOUS at 17:07

## 2020-12-18 RX ADMIN — FERROUS SULFATE TAB 325 MG (65 MG ELEMENTAL FE) 325 MG: 325 (65 FE) TAB at 22:15

## 2020-12-18 RX ADMIN — LOSARTAN POTASSIUM 50 MG: 25 TABLET, FILM COATED ORAL at 22:15

## 2020-12-18 RX ADMIN — VANCOMYCIN HYDROCHLORIDE 2250 MG: 500 INJECTION, POWDER, LYOPHILIZED, FOR SOLUTION INTRAVENOUS at 19:07

## 2020-12-18 RX ADMIN — PIPERACILLIN AND TAZOBACTAM 3.38 G: 3; .375 INJECTION, POWDER, LYOPHILIZED, FOR SOLUTION INTRAVENOUS; PARENTERAL at 18:19

## 2020-12-18 RX ADMIN — BACLOFEN 20 MG: 10 TABLET ORAL at 22:15

## 2020-12-18 ASSESSMENT — ENCOUNTER SYMPTOMS
NERVOUS/ANXIOUS: 0
WEIGHT LOSS: 0
HEARTBURN: 0
PALPITATIONS: 0
CHILLS: 0
TREMORS: 0
HALLUCINATIONS: 0
VOMITING: 0
FEVER: 0
POLYDIPSIA: 0
NECK PAIN: 0
HEMOPTYSIS: 0
BLURRED VISION: 0
HEADACHES: 0
NAUSEA: 0
BRUISES/BLEEDS EASILY: 0
ROS SKIN COMMENTS: SACRAL DECUBITUS ULCER
PHOTOPHOBIA: 0
BACK PAIN: 0
FLANK PAIN: 0
COUGH: 0
SPEECH CHANGE: 0
SPUTUM PRODUCTION: 0
FOCAL WEAKNESS: 0
DOUBLE VISION: 0
ORTHOPNEA: 0

## 2020-12-18 ASSESSMENT — LIFESTYLE VARIABLES: SUBSTANCE_ABUSE: 0

## 2020-12-18 ASSESSMENT — FIBROSIS 4 INDEX: FIB4 SCORE: 1.08

## 2020-12-18 NOTE — ED NOTES
Pt bib remsa c/o wound to buttocks, hx of decubitus. Pt lives at group home; [facility] #[phone].

## 2020-12-19 ENCOUNTER — HOSPITAL ENCOUNTER (INPATIENT)
Facility: MEDICAL CENTER | Age: 70
LOS: 3 days | DRG: 579 | End: 2020-12-23
Attending: INTERNAL MEDICINE | Admitting: STUDENT IN AN ORGANIZED HEALTH CARE EDUCATION/TRAINING PROGRAM
Payer: MEDICARE

## 2020-12-19 VITALS
OXYGEN SATURATION: 96 % | HEART RATE: 55 BPM | RESPIRATION RATE: 18 BRPM | DIASTOLIC BLOOD PRESSURE: 60 MMHG | SYSTOLIC BLOOD PRESSURE: 99 MMHG | TEMPERATURE: 98.1 F | WEIGHT: 196.21 LBS | HEIGHT: 70 IN | BODY MASS INDEX: 28.09 KG/M2

## 2020-12-19 DIAGNOSIS — L89.154 PRESSURE INJURY OF SACRAL REGION, STAGE 4 (HCC): ICD-10-CM

## 2020-12-19 LAB
ALBUMIN SERPL BCP-MCNC: 3 G/DL (ref 3.2–4.9)
ALBUMIN/GLOB SERPL: 0.9 G/DL
ALP SERPL-CCNC: 66 U/L (ref 30–99)
ALT SERPL-CCNC: 5 U/L (ref 2–50)
ANION GAP SERPL CALC-SCNC: 10 MMOL/L (ref 7–16)
AST SERPL-CCNC: 8 U/L (ref 12–45)
BASOPHILS # BLD AUTO: 0.4 % (ref 0–1.8)
BASOPHILS # BLD: 0.03 K/UL (ref 0–0.12)
BILIRUB SERPL-MCNC: 0.3 MG/DL (ref 0.1–1.5)
BUN SERPL-MCNC: 13 MG/DL (ref 8–22)
CALCIUM SERPL-MCNC: 8.7 MG/DL (ref 8.4–10.2)
CHLORIDE SERPL-SCNC: 104 MMOL/L (ref 96–112)
CO2 SERPL-SCNC: 24 MMOL/L (ref 20–33)
COVID ORDER STATUS COVID19: NORMAL
CREAT SERPL-MCNC: 0.66 MG/DL (ref 0.5–1.4)
EOSINOPHIL # BLD AUTO: 0.19 K/UL (ref 0–0.51)
EOSINOPHIL NFR BLD: 2.3 % (ref 0–6.9)
ERYTHROCYTE [DISTWIDTH] IN BLOOD BY AUTOMATED COUNT: 50.4 FL (ref 35.9–50)
GLOBULIN SER CALC-MCNC: 3.5 G/DL (ref 1.9–3.5)
GLUCOSE SERPL-MCNC: 101 MG/DL (ref 65–99)
HCT VFR BLD AUTO: 33 % (ref 42–52)
HGB BLD-MCNC: 10.8 G/DL (ref 14–18)
IMM GRANULOCYTES # BLD AUTO: 0.05 K/UL (ref 0–0.11)
IMM GRANULOCYTES NFR BLD AUTO: 0.6 % (ref 0–0.9)
LYMPHOCYTES # BLD AUTO: 1.36 K/UL (ref 1–4.8)
LYMPHOCYTES NFR BLD: 16.2 % (ref 22–41)
MCH RBC QN AUTO: 32.6 PG (ref 27–33)
MCHC RBC AUTO-ENTMCNC: 32.7 G/DL (ref 33.7–35.3)
MCV RBC AUTO: 99.7 FL (ref 81.4–97.8)
MONOCYTES # BLD AUTO: 0.99 K/UL (ref 0–0.85)
MONOCYTES NFR BLD AUTO: 11.8 % (ref 0–13.4)
NEUTROPHILS # BLD AUTO: 5.79 K/UL (ref 1.82–7.42)
NEUTROPHILS NFR BLD: 68.7 % (ref 44–72)
NRBC # BLD AUTO: 0 K/UL
NRBC BLD-RTO: 0 /100 WBC
PLATELET # BLD AUTO: 234 K/UL (ref 164–446)
PMV BLD AUTO: 8.5 FL (ref 9–12.9)
POTASSIUM SERPL-SCNC: 4.4 MMOL/L (ref 3.6–5.5)
PROT SERPL-MCNC: 6.5 G/DL (ref 6–8.2)
RBC # BLD AUTO: 3.31 M/UL (ref 4.7–6.1)
SODIUM SERPL-SCNC: 138 MMOL/L (ref 135–145)
WBC # BLD AUTO: 8.4 K/UL (ref 4.8–10.8)

## 2020-12-19 PROCEDURE — 700111 HCHG RX REV CODE 636 W/ 250 OVERRIDE (IP): Performed by: INTERNAL MEDICINE

## 2020-12-19 PROCEDURE — 700105 HCHG RX REV CODE 258: Performed by: INTERNAL MEDICINE

## 2020-12-19 PROCEDURE — 302098 PASTE RING (FLAT): Performed by: INTERNAL MEDICINE

## 2020-12-19 PROCEDURE — 80053 COMPREHEN METABOLIC PANEL: CPT

## 2020-12-19 PROCEDURE — 96366 THER/PROPH/DIAG IV INF ADDON: CPT

## 2020-12-19 PROCEDURE — A9270 NON-COVERED ITEM OR SERVICE: HCPCS | Performed by: INTERNAL MEDICINE

## 2020-12-19 PROCEDURE — 302146: Performed by: INTERNAL MEDICINE

## 2020-12-19 PROCEDURE — 96365 THER/PROPH/DIAG IV INF INIT: CPT

## 2020-12-19 PROCEDURE — 99217 PR OBSERVATION CARE DISCHARGE: CPT | Performed by: INTERNAL MEDICINE

## 2020-12-19 PROCEDURE — 96367 TX/PROPH/DG ADDL SEQ IV INF: CPT

## 2020-12-19 PROCEDURE — G0378 HOSPITAL OBSERVATION PER HR: HCPCS

## 2020-12-19 PROCEDURE — 700102 HCHG RX REV CODE 250 W/ 637 OVERRIDE(OP): Performed by: INTERNAL MEDICINE

## 2020-12-19 PROCEDURE — 770021 HCHG ROOM/CARE - ISO PRIVATE

## 2020-12-19 PROCEDURE — 85025 COMPLETE CBC W/AUTO DIFF WBC: CPT

## 2020-12-19 RX ORDER — OXYCODONE HYDROCHLORIDE 10 MG/1
10 TABLET ORAL
Status: CANCELLED | OUTPATIENT
Start: 2020-12-19

## 2020-12-19 RX ORDER — ACETAMINOPHEN 325 MG/1
650 TABLET ORAL EVERY 6 HOURS PRN
Status: CANCELLED | OUTPATIENT
Start: 2020-12-19

## 2020-12-19 RX ORDER — LOSARTAN POTASSIUM 25 MG/1
50 TABLET ORAL
Status: CANCELLED | OUTPATIENT
Start: 2020-12-19

## 2020-12-19 RX ORDER — LOSARTAN POTASSIUM 50 MG/1
50 TABLET ORAL
Status: DISCONTINUED | OUTPATIENT
Start: 2020-12-19 | End: 2020-12-24 | Stop reason: HOSPADM

## 2020-12-19 RX ORDER — FERROUS SULFATE 325(65) MG
325 TABLET ORAL 2 TIMES DAILY
Status: CANCELLED | OUTPATIENT
Start: 2020-12-19

## 2020-12-19 RX ORDER — BISACODYL 10 MG
10 SUPPOSITORY, RECTAL RECTAL
Status: CANCELLED | OUTPATIENT
Start: 2020-12-19

## 2020-12-19 RX ORDER — FERROUS SULFATE 325(65) MG
325 TABLET ORAL 2 TIMES DAILY
Status: DISCONTINUED | OUTPATIENT
Start: 2020-12-20 | End: 2020-12-24 | Stop reason: HOSPADM

## 2020-12-19 RX ORDER — TRAZODONE HYDROCHLORIDE 50 MG/1
50 TABLET ORAL NIGHTLY PRN
Status: CANCELLED | OUTPATIENT
Start: 2020-12-19

## 2020-12-19 RX ORDER — ONDANSETRON 2 MG/ML
4 INJECTION INTRAMUSCULAR; INTRAVENOUS EVERY 4 HOURS PRN
Status: DISCONTINUED | OUTPATIENT
Start: 2020-12-19 | End: 2020-12-24 | Stop reason: HOSPADM

## 2020-12-19 RX ORDER — MORPHINE SULFATE 4 MG/ML
4 INJECTION, SOLUTION INTRAMUSCULAR; INTRAVENOUS
Status: DISCONTINUED | OUTPATIENT
Start: 2020-12-19 | End: 2020-12-24 | Stop reason: HOSPADM

## 2020-12-19 RX ORDER — POLYETHYLENE GLYCOL 3350 17 G/17G
1 POWDER, FOR SOLUTION ORAL
Status: DISCONTINUED | OUTPATIENT
Start: 2020-12-19 | End: 2020-12-24 | Stop reason: HOSPADM

## 2020-12-19 RX ORDER — AMOXICILLIN 250 MG
2 CAPSULE ORAL 2 TIMES DAILY
Status: CANCELLED | OUTPATIENT
Start: 2020-12-19

## 2020-12-19 RX ORDER — ONDANSETRON 4 MG/1
4 TABLET, ORALLY DISINTEGRATING ORAL EVERY 4 HOURS PRN
Status: CANCELLED | OUTPATIENT
Start: 2020-12-19

## 2020-12-19 RX ORDER — AMLODIPINE BESYLATE 5 MG/1
10 TABLET ORAL DAILY
Status: CANCELLED | OUTPATIENT
Start: 2020-12-20

## 2020-12-19 RX ORDER — SODIUM HYPOCHLORITE 1.25 MG/ML
SOLUTION TOPICAL DAILY
Status: DISCONTINUED | OUTPATIENT
Start: 2020-12-19 | End: 2020-12-19 | Stop reason: HOSPADM

## 2020-12-19 RX ORDER — BACLOFEN 10 MG/1
20 TABLET ORAL 4 TIMES DAILY
Status: DISCONTINUED | OUTPATIENT
Start: 2020-12-19 | End: 2020-12-24 | Stop reason: HOSPADM

## 2020-12-19 RX ORDER — OXYCODONE HYDROCHLORIDE 10 MG/1
10 TABLET ORAL
Status: DISCONTINUED | OUTPATIENT
Start: 2020-12-19 | End: 2020-12-24 | Stop reason: HOSPADM

## 2020-12-19 RX ORDER — AMOXICILLIN 250 MG
2 CAPSULE ORAL 2 TIMES DAILY
Status: DISCONTINUED | OUTPATIENT
Start: 2020-12-20 | End: 2020-12-23

## 2020-12-19 RX ORDER — BISACODYL 10 MG
10 SUPPOSITORY, RECTAL RECTAL
Status: DISCONTINUED | OUTPATIENT
Start: 2020-12-19 | End: 2020-12-23

## 2020-12-19 RX ORDER — BACLOFEN 10 MG/1
20 TABLET ORAL 4 TIMES DAILY
Status: CANCELLED | OUTPATIENT
Start: 2020-12-19

## 2020-12-19 RX ORDER — FLUTICASONE PROPIONATE 50 MCG
2 SPRAY, SUSPENSION (ML) NASAL DAILY
Status: CANCELLED | OUTPATIENT
Start: 2020-12-20

## 2020-12-19 RX ORDER — OXYCODONE HYDROCHLORIDE 5 MG/1
5 TABLET ORAL
Status: CANCELLED | OUTPATIENT
Start: 2020-12-19

## 2020-12-19 RX ORDER — AMLODIPINE BESYLATE 10 MG/1
10 TABLET ORAL DAILY
Status: DISCONTINUED | OUTPATIENT
Start: 2020-12-20 | End: 2020-12-23

## 2020-12-19 RX ORDER — FLUTICASONE PROPIONATE 50 MCG
2 SPRAY, SUSPENSION (ML) NASAL DAILY
Status: DISCONTINUED | OUTPATIENT
Start: 2020-12-20 | End: 2020-12-24 | Stop reason: HOSPADM

## 2020-12-19 RX ORDER — ONDANSETRON 2 MG/ML
4 INJECTION INTRAMUSCULAR; INTRAVENOUS EVERY 4 HOURS PRN
Status: CANCELLED | OUTPATIENT
Start: 2020-12-19

## 2020-12-19 RX ORDER — POLYETHYLENE GLYCOL 3350 17 G/17G
1 POWDER, FOR SOLUTION ORAL
Status: CANCELLED | OUTPATIENT
Start: 2020-12-19

## 2020-12-19 RX ORDER — ONDANSETRON 4 MG/1
4 TABLET, ORALLY DISINTEGRATING ORAL EVERY 4 HOURS PRN
Status: DISCONTINUED | OUTPATIENT
Start: 2020-12-19 | End: 2020-12-24 | Stop reason: HOSPADM

## 2020-12-19 RX ORDER — TRAZODONE HYDROCHLORIDE 50 MG/1
50 TABLET ORAL NIGHTLY PRN
Status: DISCONTINUED | OUTPATIENT
Start: 2020-12-19 | End: 2020-12-24 | Stop reason: HOSPADM

## 2020-12-19 RX ORDER — OXYCODONE HYDROCHLORIDE 5 MG/1
5 TABLET ORAL
Status: DISCONTINUED | OUTPATIENT
Start: 2020-12-19 | End: 2020-12-24 | Stop reason: HOSPADM

## 2020-12-19 RX ORDER — ACETAMINOPHEN 325 MG/1
650 TABLET ORAL EVERY 6 HOURS PRN
Status: DISCONTINUED | OUTPATIENT
Start: 2020-12-19 | End: 2020-12-24 | Stop reason: HOSPADM

## 2020-12-19 RX ORDER — MORPHINE SULFATE 4 MG/ML
4 INJECTION, SOLUTION INTRAMUSCULAR; INTRAVENOUS
Status: CANCELLED | OUTPATIENT
Start: 2020-12-19

## 2020-12-19 RX ADMIN — SENNOSIDES-DOCUSATE SODIUM TAB 8.6-50 MG 2 TABLET: 8.6-5 TAB at 06:37

## 2020-12-19 RX ADMIN — AMLODIPINE BESYLATE 10 MG: 5 TABLET ORAL at 06:37

## 2020-12-19 RX ADMIN — VANCOMYCIN HYDROCHLORIDE 1500 MG: 500 INJECTION, POWDER, LYOPHILIZED, FOR SOLUTION INTRAVENOUS at 19:43

## 2020-12-19 RX ADMIN — FERROUS SULFATE TAB 325 MG (65 MG ELEMENTAL FE) 325 MG: 325 (65 FE) TAB at 06:37

## 2020-12-19 RX ADMIN — BACLOFEN 20 MG: 10 TABLET ORAL at 17:17

## 2020-12-19 RX ADMIN — SENNOSIDES-DOCUSATE SODIUM TAB 8.6-50 MG 2 TABLET: 8.6-5 TAB at 17:18

## 2020-12-19 RX ADMIN — BACLOFEN 20 MG: 10 TABLET ORAL at 08:05

## 2020-12-19 RX ADMIN — PIPERACILLIN AND TAZOBACTAM 3.38 G: 3; .375 INJECTION, POWDER, LYOPHILIZED, FOR SOLUTION INTRAVENOUS; PARENTERAL at 00:35

## 2020-12-19 RX ADMIN — PIPERACILLIN SODIUM, TAZOBACTAM SODIUM 3.38 G: 3; .375 INJECTION, POWDER, LYOPHILIZED, FOR SOLUTION INTRAVENOUS at 21:47

## 2020-12-19 RX ADMIN — LOSARTAN POTASSIUM 50 MG: 50 TABLET, FILM COATED ORAL at 21:49

## 2020-12-19 RX ADMIN — ACETAMINOPHEN 650 MG: 325 TABLET, FILM COATED ORAL at 18:50

## 2020-12-19 RX ADMIN — PIPERACILLIN AND TAZOBACTAM 3.38 G: 3; .375 INJECTION, POWDER, LYOPHILIZED, FOR SOLUTION INTRAVENOUS; PARENTERAL at 14:09

## 2020-12-19 RX ADMIN — PIPERACILLIN AND TAZOBACTAM 3.38 G: 3; .375 INJECTION, POWDER, LYOPHILIZED, FOR SOLUTION INTRAVENOUS; PARENTERAL at 06:36

## 2020-12-19 RX ADMIN — BACLOFEN 20 MG: 10 TABLET ORAL at 21:47

## 2020-12-19 RX ADMIN — BACLOFEN 20 MG: 10 TABLET ORAL at 14:08

## 2020-12-19 RX ADMIN — FERROUS SULFATE TAB 325 MG (65 MG ELEMENTAL FE) 325 MG: 325 (65 FE) TAB at 17:17

## 2020-12-19 ASSESSMENT — ENCOUNTER SYMPTOMS
NAUSEA: 0
CHILLS: 0
VOMITING: 0
COUGH: 0
MYALGIAS: 0
SHORTNESS OF BREATH: 0
PALPITATIONS: 0
FEVER: 0
ABDOMINAL PAIN: 0
NECK PAIN: 0

## 2020-12-19 ASSESSMENT — COGNITIVE AND FUNCTIONAL STATUS - GENERAL
WALKING IN HOSPITAL ROOM: TOTAL
PERSONAL GROOMING: A LOT
MOVING TO AND FROM BED TO CHAIR: A LOT
SUGGESTED CMS G CODE MODIFIER MOBILITY: CM
MOVING FROM LYING ON BACK TO SITTING ON SIDE OF FLAT BED: A LOT
CLIMB 3 TO 5 STEPS WITH RAILING: TOTAL
SUGGESTED CMS G CODE MODIFIER DAILY ACTIVITY: CL
TURNING FROM BACK TO SIDE WHILE IN FLAT BAD: A LOT
MOBILITY SCORE: 9
TOILETING: A LOT
DRESSING REGULAR LOWER BODY CLOTHING: A LOT
DAILY ACTIVITIY SCORE: 11
STANDING UP FROM CHAIR USING ARMS: TOTAL
HELP NEEDED FOR BATHING: TOTAL
EATING MEALS: A LOT
DRESSING REGULAR UPPER BODY CLOTHING: A LOT

## 2020-12-19 ASSESSMENT — LIFESTYLE VARIABLES
TOTAL SCORE: 0
CONSUMPTION TOTAL: NEGATIVE
TOTAL SCORE: 0
HOW MANY TIMES IN THE PAST YEAR HAVE YOU HAD 5 OR MORE DRINKS IN A DAY: 0
DOES PATIENT WANT TO STOP DRINKING: NO
HAVE YOU EVER FELT YOU SHOULD CUT DOWN ON YOUR DRINKING: NO
AVERAGE NUMBER OF DAYS PER WEEK YOU HAVE A DRINK CONTAINING ALCOHOL: 7
ALCOHOL_USE: YES
EVER FELT BAD OR GUILTY ABOUT YOUR DRINKING: NO
EVER HAD A DRINK FIRST THING IN THE MORNING TO STEADY YOUR NERVES TO GET RID OF A HANGOVER: NO
TOTAL SCORE: 0
HAVE PEOPLE ANNOYED YOU BY CRITICIZING YOUR DRINKING: NO
ON A TYPICAL DAY WHEN YOU DRINK ALCOHOL HOW MANY DRINKS DO YOU HAVE: 1

## 2020-12-19 NOTE — ASSESSMENT & PLAN NOTE
Concern for sacral osteomyelitis according to CT of the pelvis report.  MRI is not possible due to metal rods in his lower extremities  Patient was started on empiric antibiotics  Infection disease has been consulted   Wound care also consulted and recommended for evaluation by plastic surgery as might need debridement and possible wound vac.   Plastic surgery Dr. Cummins has been consulted and will evaluate pt appreciate rec.  Will transfer pt to [facility] for plastic surgery evaluation.

## 2020-12-19 NOTE — DISCHARGE PLANNING
Contacted transfer center to follow up with transfer to [facility]. Pending pt's covid results and bed availability at [facility]. Informed Dr. Goldman.

## 2020-12-19 NOTE — H&P
Hospital Medicine History & Physical Note    Date of Service  12/18/2020    Primary Care Physician  Juancarlos Moser N.P.    Consultants  none    Code Status  Full Code    Chief Complaint  Chief Complaint   Patient presents with   • Wound Check       History of Presenting Illness  70 y.o. male with history of C5 C7 complete paraplegia, decubitus ulcers s/p plastic flap surgery by Dr. Moon, neurogenic bladder with indwelling Cazares catheter which was changed on Wednesday, who presented 12/18/2020 with worsening of decubitus wound.  Patient was referred to emergency department by his home health wound nurse with worsening sacral decubitus wound.  The wounds has been becoming deeper, larger, there was copious serosanguineous discharge and foul smell.  It was thought that patient at this point requires at least nursing home level of wound care.  Relation with CT of the abdomen pelvis with contrast showed decubitus ulcer overlying the lower sacrum with multiple skin defects and probable sacral osteomyelitis.  Patient was started on Zosyn in the emergency department.  He is not septic.    Review of Systems  Review of Systems   Constitutional: Negative for chills, fever and weight loss.   HENT: Negative for ear pain, hearing loss and tinnitus.    Eyes: Negative for blurred vision, double vision and photophobia.   Respiratory: Negative for cough, hemoptysis and sputum production.    Cardiovascular: Negative for chest pain, palpitations and orthopnea.   Gastrointestinal: Negative for heartburn, nausea and vomiting.   Genitourinary: Negative for dysuria, flank pain, frequency and hematuria.   Musculoskeletal: Negative for back pain, joint pain and neck pain.   Skin: Negative for itching and rash.        Sacral decubitus ulcer   Neurological: Negative for tremors, speech change, focal weakness and headaches.   Endo/Heme/Allergies: Negative for environmental allergies and polydipsia. Does not bruise/bleed easily.    Psychiatric/Behavioral: Negative for hallucinations and substance abuse. The patient is not nervous/anxious.        Past Medical History   has a past medical history of A-fib (HCC), Atrial flutter (HCC), GERD (gastroesophageal reflux disease), Hypertension, Neurogenic bladder, and Quadriplegia, C5-C7 complete (HCC).    Surgical History   has a past surgical history that includes percutaneouospinning lower extremity; colostomy; colostomy takedown; colostomy (N/A, 7/27/2019); ulcer debridement (N/A, 8/21/2019); flap closure (8/21/2019); and hernia repair.     Family History  family history includes Heart Disease in his father.     Social History   reports that he quit smoking about 43 years ago. His smoking use included cigarettes. He started smoking about 54 years ago. He smoked 1.00 pack per day. He has never used smokeless tobacco. He reports current alcohol use. He reports that he does not use drugs.    Allergies  Allergies   Allergen Reactions   • Sulfa Drugs Rash     Rash         Medications  Prior to Admission Medications   Prescriptions Last Dose Informant Patient Reported? Taking?   Cholecalciferol (VITAMIN D) 2000 UNIT Tab 12/18/2020 at 0800 MAR from Other Facility Yes Yes   Sig: Take 2,000 Units by mouth every day.   Non Formulary Request 12/18/2020 at 0800 MAR from Other Facility Yes Yes   Sig: Take 1 Tab by mouth every day. Mannose 900 mg Cranberry 500 mg   Probiotic Product (PROBIOTIC PO) 12/18/2020 at 0800 MAR from Other Facility Yes Yes   Sig: Take 1 Tab by mouth every day.   acetaminophen (TYLENOL) 500 MG Tab 12/18/2020 at 0800 MAR from Other Facility Yes Yes   Sig: Take 500-1,000 mg by mouth every four hours as needed. Takes 1000 mg twice daily then 500 mg every 4 hours as needed (3grams per 24 hours)   amLODIPine (NORVASC) 5 MG Tab 12/18/2020 at 0800 MAR from Other Facility Yes No   Sig: Take 10 mg by mouth every day.   ascorbic acid (ASCORBIC ACID) 500 MG Tab 12/18/2020 at 0800 MAR from Other  Facility Yes Yes   Sig: Take 500 mg by mouth 2 (two) times a day.   baclofen (LIORESAL) 20 MG tablet 12/18/2020 at 0800 MAR from Other Facility Yes No   Sig: Take 20 mg by mouth 4 times a day.   dakins 0.125%, 1/4 strength, 0.125 % Solution NOT TAKING at NOT TAKING MAR from Other Facility No No   Sig: Apply 10 mL to affected area(s) 2 Times a Day.   Patient not taking: Reported on 12/18/2020   docusate sodium (COLACE) 100 MG Cap 12/18/2020 at 0800 MAR from Other Facility Yes No   Sig: Take 200 mg by mouth 2 times a day.   ferrous sulfate 325 (65 Fe) MG tablet 12/18/2020 at 0800 MAR from Other Facility Yes No   Sig: Take 325 mg by mouth 2 Times a Day.   fluticasone (FLONASE) 50 MCG/ACT nasal spray PRN at PRN MAR from Other Facility Yes Yes   Sig: Administer 2 Sprays into affected nostril(S) 1 time a day as needed.   losartan (COZAAR) 50 MG Tab 12/17/2020 at PM MAR from Other Facility Yes No   Sig: Take 50 mg by mouth every day.   magnesium oxide (MAG-OX) 400 MG Tab tablet 12/18/2020 at 0800 MAR from Other Facility Yes Yes   Sig: Take 400 mg by mouth every day.   metoprolol (LOPRESSOR) 25 MG Tab NOT TAKING at NOT TAKING MAR from Other Facility No No   Sig: Take 1 Tab by mouth 2 times a day.   Patient not taking: Reported on 12/18/2020   polyethylene glycol/lytes (MIRALAX) 17 g Pack 12/18/2020 at 0800 MAR from Other Facility Yes Yes   Sig: Take 17 g by mouth every day.   rivaroxaban (XARELTO) 10 MG Tab tablet 12/17/2020 at PM MAR from Other Facility Yes No   Sig: Take 10 mg by mouth with dinner.   sennosides (SENOKOT) 8.6 MG Tab 12/18/2020 at 0800 MAR from Other Facility Yes No   Sig: Take 17.2 mg by mouth 2 times a day.   therapeutic multivitamin-minerals (THERAGRAN-M) Tab 12/18/2020 at 0800 MAR from Other Facility Yes No   Sig: Take 1 Tab by mouth every day.   traZODone (DESYREL) 50 MG Tab PRN at PRN MAR from Other Facility Yes No   Sig: Take 50 mg by mouth at bedtime as needed.      Facility-Administered  Medications: None       Physical Exam  Temp:  [37.4 °C (99.3 °F)] 37.4 °C (99.3 °F)  Pulse:  [51-55] 53  Resp:  [16] 16  BP: (105-115)/(57-60) 105/57  SpO2:  [93 %-95 %] 93 %    Physical Exam  Vitals signs and nursing note reviewed.   Constitutional:       General: He is not in acute distress.     Appearance: Normal appearance.   HENT:      Head: Normocephalic and atraumatic.      Nose: Nose normal.      Mouth/Throat:      Mouth: Mucous membranes are moist.   Eyes:      Extraocular Movements: Extraocular movements intact.      Pupils: Pupils are equal, round, and reactive to light.   Neck:      Musculoskeletal: Normal range of motion and neck supple.   Cardiovascular:      Rate and Rhythm: Normal rate and regular rhythm.   Pulmonary:      Effort: Pulmonary effort is normal.      Breath sounds: Normal breath sounds.   Abdominal:      General: Abdomen is flat. There is no distension.      Tenderness: There is no abdominal tenderness.   Musculoskeletal: Normal range of motion.         General: No swelling or deformity.      Comments: Multiple sacral decubitus ulcers with serosanguineous discharge and foul smell   Skin:     General: Skin is warm and dry.   Neurological:      General: No focal deficit present.      Mental Status: He is alert and oriented to person, place, and time.      Comments: Complete paraplegia with sensory loss below the waist       Psychiatric:         Mood and Affect: Mood normal.         Behavior: Behavior normal.         Laboratory:  Recent Labs     12/18/20  1425   WBC 7.8   RBC 3.65*   HEMOGLOBIN 11.7*   HEMATOCRIT 35.2*   MCV 96.4   MCH 32.1   MCHC 33.2*   RDW 48.8   PLATELETCT 259   MPV 8.5*     Recent Labs     12/18/20  1425   SODIUM 135   POTASSIUM 4.4   CHLORIDE 101   CO2 22   GLUCOSE 93   BUN 11   CREATININE 0.52   CALCIUM 9.0     Recent Labs     12/18/20  1425   ALTSGPT 5   ASTSGOT 8*   ALKPHOSPHAT 74   TBILIRUBIN 0.3   GLUCOSE 93         No results for input(s): NTPROBNP in the last  72 hours.      No results for input(s): TROPONINT in the last 72 hours.    Imaging:  CT-PELVIS WITH   Final Result      Findings consistent with decubitus ulcer overlying the lower sacrum with multiple skin defects and probable associated sacral osteomyelitis.  Catheter fragment appears to be present in the soft tissues overlying the sacrum and extending into the LEFT    gluteal region.            Assessment/Plan:  I anticipate this patient is appropriate for observation status at this time.    Decubitus ulcer  Assessment & Plan  Concern for sacral osteomyelitis according to CT of the pelvis report.  MRI is not possible due to metal rods in his lower extremities  Patient was started on empiric antibiotics  We will plan to discuss case with plastic surgery and infectious disease  Wound care consult    Neurogenic bladder- (present on admission)  Assessment & Plan  Cazares catheter was last changed on Wednesday.    Essential hypertension  Assessment & Plan  Continue home medications  Monitor blood pressure    Paroxysmal atrial flutter (HCC)- (present on admission)  Assessment & Plan  Heart rate is under control  We will hold Xarelto for possible debridement  Continue metoprolol

## 2020-12-19 NOTE — DISCHARGE SUMMARY
Discharge Summary    CHIEF COMPLAINT ON ADMISSION  Chief Complaint   Patient presents with   • Wound Check       Reason for Admission  Decubitus ulcer     CODE STATUS  Full Code    HPI & HOSPITAL COURSE  This is a 70 y.o. male history of C5 C7 complete paraplegia, decubitus ulcers s/p plastic flap surgery by Dr. Moon, neurogenic bladder with indwelling Cazares catheter which was changed on Wednesday, who presented 12/18/2020 with worsening of decubitus wound.  Patient was referred to emergency department here at [facility] by his home health wound nurse with worsening sacral decubitus wound.  The wounds has been becoming deeper, larger, there was copious serosanguineous discharge and foul smell.  Relation with CT of the abdomen pelvis with contrast showed decubitus ulcer overlying the lower sacrum with multiple skin defects and probable sacral osteomyelitis.  Pt follows with Dr. Moon his plastic surgeon who did his flap in August of 2019.   Here Pt seen by wound care and recommending for plastic evaluation.  I have discussed case case with Dr. Cummins who kindly agreed to evaluate pt. I will transfer pt to [facility] for plastic surgery evaluation also Infection disease has been consulted.     DISCHARGE DIAGNOSES  Active Problems:    Paroxysmal atrial flutter (HCC) POA: Yes    Neurogenic bladder POA: Yes    Decubitus ulcer POA: Unknown  Resolved Problems:    * No resolved hospital problems. *      FOLLOW UP  No future appointments.  No follow-up provider specified.    MEDICATIONS ON DISCHARGE     Medication List      ASK your doctor about these medications      Instructions   acetaminophen 500 MG Tabs  Commonly known as: TYLENOL   Take 500-1,000 mg by mouth every four hours as needed. Takes 1000 mg twice daily then 500 mg every 4 hours as needed (3grams per 24 hours)  Dose: 500-1,000 mg     amLODIPine 5 MG Tabs  Commonly known as: NORVASC   Take 10 mg by mouth every day.  Dose: 10 mg     ascorbic acid 500 MG  Tabs  Commonly known as: ascorbic acid   Take 500 mg by mouth 2 (two) times a day.  Dose: 500 mg     baclofen 20 MG tablet  Commonly known as: LIORESAL   Take 20 mg by mouth 4 times a day.  Dose: 20 mg     dakins 0.125% (1/4 strength) 0.125 % Soln   Apply 10 mL to affected area(s) 2 Times a Day.  Dose: 10 mL     docusate sodium 100 MG Caps  Commonly known as: COLACE   Take 200 mg by mouth 2 times a day.  Dose: 200 mg     ferrous sulfate 325 (65 Fe) MG tablet   Take 325 mg by mouth 2 Times a Day.  Dose: 325 mg     fluticasone 50 MCG/ACT nasal spray  Commonly known as: FLONASE   Administer 2 Sprays into affected nostril(S) 1 time a day as needed.  Dose: 2 Spray     losartan 50 MG Tabs  Commonly known as: COZAAR   Take 50 mg by mouth every day.  Dose: 50 mg     magnesium oxide 400 MG Tabs tablet  Commonly known as: MAG-OX   Take 400 mg by mouth every day.  Dose: 400 mg     metoprolol 25 MG Tabs  Commonly known as: LOPRESSOR   Take 1 Tab by mouth 2 times a day.  Dose: 25 mg     Non Formulary Request   Take 1 Tab by mouth every day. Mannose 900 mg Cranberry 500 mg  Dose: 1 Tab     polyethylene glycol/lytes 17 g Pack  Commonly known as: MIRALAX   Take 17 g by mouth every day.  Dose: 17 g     PROBIOTIC PO   Take 1 Tab by mouth every day.  Dose: 1 Tab     sennosides 8.6 MG Tabs  Commonly known as: SENOKOT   Take 17.2 mg by mouth 2 times a day.  Dose: 17.2 mg     therapeutic multivitamin-minerals Tabs   Take 1 Tab by mouth every day.  Dose: 1 Tab     traZODone 50 MG Tabs  Commonly known as: DESYREL   Take 50 mg by mouth at bedtime as needed.  Dose: 50 mg     vitamin D 2000 UNIT Tabs   Take 2,000 Units by mouth every day.  Dose: 2,000 Units     Xarelto 10 MG Tabs tablet  Generic drug: rivaroxaban   Take 10 mg by mouth with dinner.  Dose: 10 mg            Allergies  Allergies   Allergen Reactions   • Sulfa Drugs Rash     Rash         DIET  Orders Placed This Encounter   Procedures   • Diet Order Diet: Regular     Standing  Status:   Standing     Number of Occurrences:   1     Order Specific Question:   Diet:     Answer:   Regular [1]         LINES, DRAINS, AND WOUNDS  This is an automated list. Peripheral IVs will be removed prior to discharge.  Peripheral IV 12/18/20 20 G Right Wrist (Active)   Site Assessment Clean;Dry;Intact 12/19/20 0800   Dressing Type Transparent 12/19/20 0800   Line Status Infusing 12/19/20 0800   Dressing Status Clean;Dry;Intact 12/19/20 0800   Dressing Intervention Initial dressing 12/18/20 1425   Infiltration Grading ([facility], [facility]) 0 12/18/20 1425   Phlebitis Scale ([facility] Only) 0 12/18/20 1425     Urethral Catheter (Active)      Wound 12/18/20 Heel Posterior Left (Active)   Wound Image     12/18/20 2249   Site Assessment Red;Non-healing 12/18/20 2249   Periwound Assessment Pink;Red 12/18/20 2249   Margins Unattached edges 12/18/20 2249   Drainage Amount Small 12/18/20 2249   Drainage Description Brown;Sanguineous;Purulent 12/18/20 2249   Treatments Site care;Cleansed 12/18/20 2249   Wound Cleansing Normal Saline Irrigation 12/18/20 2249   Dressing Options Hydrofiber Silver;Nonadhesive Foam 12/18/20 2249   Dressing Changed New 12/18/20 2249   Dressing Status Clean;Dry;Intact 12/19/20 0806   Wound Length (cm) 1.7 cm 12/18/20 2249   Wound Width (cm) 1.2 cm 12/18/20 2249   Wound Depth (cm) 0.4 cm 12/18/20 2249   Wound Surface Area (cm^2) 2.04 cm^2 12/18/20 2249   Wound Volume (cm^3) 0.82 cm^3 12/18/20 2249       Wound 12/18/20 Buttocks;Coccyx Posterior;Lower (Active)   Wound Image    12/18/20 2303   Site Assessment Pink;Bleeding;Drainage;Non-healing 12/18/20 2303   Periwound Assessment Red 12/18/20 2303   Margins Unattached edges 12/18/20 2303   Drainage Amount Moderate 12/18/20 2303   Drainage Description Sanguineous;Purulent 12/18/20 2303   Treatments Cleansed;Site care 12/18/20 2303   Wound Cleansing Normal Saline Irrigation 12/18/20 2303   Dressing Options Hydrofiber Silver;Nonadhesive Foam 12/18/20 2303    Dressing Changed New 12/18/20 2303   Dressing Status Clean;Dry;Intact 12/18/20 2303   Wound Length (cm) 5 cm 12/18/20 2303   Wound Width (cm) 2 cm 12/18/20 2303   Wound Depth (cm) 0.7 cm 12/18/20 2303   Wound Surface Area (cm^2) 10 cm^2 12/18/20 2303   Wound Volume (cm^3) 7 cm^3 12/18/20 2303       Wound 12/18/20 Leg Anterior;Lower Right (Active)   Wound Image   12/18/20 2235   Site Assessment Pink;Red 12/18/20 2235   Periwound Assessment Red 12/18/20 2235   Margins Unattached edges 12/18/20 2235   Drainage Amount Scant 12/18/20 2235   Drainage Description Sanguineous 12/18/20 2235   Treatments Ice applied;Site care 12/18/20 2235   Dressing Options Nonadhesive Foam 12/18/20 2235   Dressing Changed New 12/18/20 2235   Dressing Status Clean;Dry;Intact 12/19/20 0806       Peripheral IV 12/18/20 20 G Right Wrist (Active)   Site Assessment Clean;Dry;Intact 12/19/20 0800   Dressing Type Transparent 12/19/20 0800   Line Status Infusing 12/19/20 0800   Dressing Status Clean;Dry;Intact 12/19/20 0800   Dressing Intervention Initial dressing 12/18/20 1425   Infiltration Grading ([facility], [facility]) 0 12/18/20 1425   Phlebitis Scale ([facility] Only) 0 12/18/20 1425           Urethral Catheter (Active)        CONSULTATIONS  Plastic surgery   Infection disease       LABORATORY  Lab Results   Component Value Date    SODIUM 138 12/19/2020    POTASSIUM 4.4 12/19/2020    CHLORIDE 104 12/19/2020    CO2 24 12/19/2020    GLUCOSE 101 (H) 12/19/2020    BUN 13 12/19/2020    CREATININE 0.66 12/19/2020        Lab Results   Component Value Date    WBC 8.4 12/19/2020    HEMOGLOBIN 10.8 (L) 12/19/2020    HEMATOCRIT 33.0 (L) 12/19/2020    PLATELETCT 234 12/19/2020        Total time of the discharge process exceeds 39 minutes.

## 2020-12-19 NOTE — DISCHARGE PLANNING
Anticipated Discharge Disposition: [facility] (T406)     Action: LSW was informed by transfer center that pt has a bed at [facility] T406. LSW informed bedside RN. LSW completed remsa & transport packet and faxed to [facility] for processing. [facility] is aware.     Requested transport time is 1630.     Pt came from [facility] [phone]. LSW called to determine if they notified pt's family of his hospitalization. No answer so LSW left message.     Barriers to Discharge: pending transportation confirmation     Plan: Follow up with [facility] regarding remsa transport time. Complete cobra

## 2020-12-19 NOTE — WOUND TEAM
[facility]  Inpatient Services  Initial Wound and Skin Care Evaluation    Admission Date: 12/18/2020     Last order of IP CONSULT TO WOUND CARE was found on 12/18/2020 from Hospital Encounter on 12/18/2020       HPI, PMH, SH: Reviewed    Unit where seen by Wound Team: 1106/01     WOUND CONSULT/FOLLOW UP RELATED TO:  Sacrum, BL heels, RLE      Self Report / Pain Level:  denies       OBJECTIVE:  Pt on pressure redistribution mattress. Hydrofiber silver dsg in place to all open wounds.    WOUND TYPE, LOCATION, CHARACTERISTICS (Pressure Injuries: location, stage, POA or date identified)          Wound 12/18/20 Pressure Injury Heel Posterior Left POA (Active)   Wound Image     12/18/20 2249   Site Assessment Red;Purple    Periwound Assessment Red;Fragile    Margins Defined edges    Closure Secondary intention    Drainage Amount Scant    Drainage Description Serosanguineous    Treatments Offloading    Wound Cleansing Normal Saline Irrigation    Periwound Protectant Not Applicable    Dressing Options Hydrofiber Silver;Mepilex Heel    Dressing Changed Observed    Dressing Status Intact    Dressing Change/Treatment Frequency Every 48 hrs, and As Needed    NEXT Dressing Change/Treatment Date 12/19/20    NEXT Weekly Photo (Inpatient Only) 12/19/20    Wound Length (cm) 5 cm    Wound Width (cm) 3 cm    Wound Depth (cm) 0 cm    Wound Surface Area (cm^2) 15 cm^2    Wound Volume (cm^3) 0 cm^3    Wound Healing % 100    Shape irregular    Wound Odor Mild    Exposed Structures None        Wound 12/18/20 Pressure Injury Sacrum POA (Active)   Wound Image   12/19/20 0900   Site Assessment Yellow;Red;Drainage =   Periwound Assessment Pink;Fragile    Margins Attached edges    Closure Secondary intention    Drainage Amount Moderate    Drainage Description Serosanguineous;Yellow    Treatments Cleansed;Site care;Offloading    Wound Cleansing Normal Saline Irrigation    Periwound Protectant Barrier Paste    Dressing  Cleansing/Solutions 1/4 Strength Dakin's Solution    Dressing Options Moist Roll Gauze;Mepilex    Dressing Changed New    Dressing Status Intact    Dressing Change/Treatment Frequency Every Shift, and As Needed    NEXT Dressing Change/Treatment Date 12/19/20    NEXT Weekly Photo (Inpatient Only) 12/26/20    Wound Length (cm) 2 cm    Wound Width (cm) 5 cm    Wound Depth (cm) 1.5 cm    Wound Surface Area (cm^2) 10 cm^2    Wound Volume (cm^3) 15 cm^3    Wound Healing % -114    Undermining (cm) 8 cm    Undermining of Wound, 1st Location From 12 o'clock;To 2 o'clock    Undermining (cm) - 2nd location 0.5 cm    Undermining of Wound, 2nd Location From 4 o'clock;To 6 o'clock    Shape irregular    Wound Odor None    Pulses N/A    Exposed Structures Connective tissue        Wound 12/18/20 Partial Thickness Wound Anterior;Lower Bilateral (Active)   Wound Image       12/18/20 2523   Site Assessment Red;Purple    Periwound Assessment Dry;Fragile;Clean    Margins Defined edges    Closure Secondary intention    Drainage Amount Scant    Drainage Description Serosanguineous    Treatments Site care    Wound Cleansing Approved Wound Cleanser    Periwound Protectant Not Applicable    Dressing Cleansing/Solutions Not Applicable    Dressing Options Hydrofiber Silver    Dressing Changed Observed    Dressing Status Intact    Dressing Change/Treatment Frequency Every 48 hrs, and As Needed    NEXT Dressing Change/Treatment Date 12/19/20    NEXT Weekly Photo (Inpatient Only) 12/26/20    Non-staged Wound Description Partial thickness    Wound Bed Granulation (%) 0 %    Tunneling (cm) 0 cm    Shape irregular; varying in size    Wound Odor None    Exposed Structures None         Vascular:    JUAN MANUEL:   No results found.      Lab Values:    Lab Results   Component Value Date/Time    WBC 8.4 12/19/2020 04:23 AM    RBC 3.31 (L) 12/19/2020 04:23 AM    HEMOGLOBIN 10.8 (L) 12/19/2020 04:23 AM    HEMATOCRIT 33.0 (L) 12/19/2020 04:23 AM    CREACTPROT 4.53  (H) 12/18/2020 02:25 PM    SEDRATEWES 71 (H) 12/18/2020 02:25 PM          Culture:   Obtained 12/18/20,   Culture Results show:  No results found for this or any previous visit (from the past 720 hour(s)).      INTERVENTIONS BY WOUND TEAM:  Pt turned to right side with assistance from CNA. Sacrum assessed and cleansed with NS and gauze. Pictures and measurements obtained. WTD dgs placed and secured with sacral mepilex. BLE and heels assessed. Hydrofiber silver dsg already in place and secured with adhesive foams.  Conte boots reapplied.    Interdisciplinary consultation: Patient, Angeline RN. Dr. Goldman    EVALUATION: Pt presenting with stage 4 pressure injury to sacrum, PMH of complete paraplegia and chronic decubitus ulcer s/p flap sx. Previously undergoing NPWT by [place] wound care team who encouraged pt to seek medical assistance for worsening ulcer. Wound bed with partial granulation on exposed wound bed. About 8 cm of undermining at 1200 ( see flowsheet for details) with exposed slough. Mod serosanguinous/yellow drainage with minimal odor. Bone is not visible or palpable under flap, muscle is palpable. Pt will benefit from surgical debridement of flap with ongoing NPWT, specifically, veraflo cleanse choice for ongoing mechanical debridement. Discussed with Dr. Goldman who will place consult for plastics evaluation. Notified primary RN. In the meantime, will implement Dakin's WTD dsg to encourage ongoing chemical debridement of wound.    L heel presenting with DTI. Nursing to place Hydrofiber silver to wound and secure with mepilex. Continue offloading BLE with moon boots. Scattered BLE wounds will also benefit from hydrofiber silver to decrease risk of infection and aid with drainage. Nursing to apply per dressing care order.    Established colostomy in place. Nursing to manage.    Goals: Steady decrease in wound area and depth weekly.    NURSING PLAN OF CARE ORDERS (X):    Dressing changes: See Dressing Care orders:  x  Skin care: See Skin Care orders: x  Rectal tube care: See Rectal Tube Care orders:   Other orders:    RSKIN:   CURRENTLY IN PLACE (X), APPLIED THIS VISIT (A), ORDERED (O):   Q shift Luis Enrique:  x  Q shift pressure point assessments:  x  Pressure redistribution mattress            Low Airloss        O  Bariatric JERARDO         Bariatric foam           Heel float boots     Heel Silicone dressing    O    Float Heels off Bed with Pillows    x           Barrier wipes         Barrier Cream         Barrier paste          Sacral silicone dressing O      Silicone O2 tubing         Anchorfast         Cannula fixation Device (Tender )          Gray Foam Ear protectors           Trach with Optifoam split foam                 Waffle cushion        Waffle Overlay         Rectal tube or BMS    Purwick/Condom Cath   X hutchins       Antifungal tx      Interdry          Reposition q 2 hours  x      Up to chair        Ambulate      PT/OT        Dietician        Diabetes Education      PO   x  TF     TPN     NPO   # days   Other    TAPS    WOUND TEAM PLAN OF CARE   Dressing changes by wound team:          Follow up 1-2 times weekly:     X nursing to perform dsg/skin care; WT following          Follow up 3 times weekly:                NPWT change 3 times weekly:     Follow up as needed:       Other (explain):     Anticipated discharge plans: TBD-pending surgical eval.  LTACH:        SNF/Rehab:                  Home Care:           Outpatient Wound Center:            Self Care:

## 2020-12-19 NOTE — ASSESSMENT & PLAN NOTE
Excisional debridement of sacral pressure ulcer including skin, subcutaneous tissue, muscle and fascia with sacral bone   Biopsy.12/20/2020  Pain control  Wound care  IV Zosyn  Follow cultures  PICC line placement 12/21/2020

## 2020-12-19 NOTE — ED PROVIDER NOTES
ED Provider Note    CHIEF COMPLAINT  Chief Complaint   Patient presents with   • Wound Check       HPI  Osvaldo Pate is a 70 y.o. male who presents to the emergency department with complaint of cutis ulcer.  The patient is a partial quadriplegic and states that he has been working with wound care for several weeks on different wounds.  I decubitus ulcer but now has another lesion and the wound care nurse believes there may be a track between the 2 different lesions.  The patient denies fever, shakes, chills, sweats, nausea, vomiting, abdominal pain.  He cannot feel his sacrum at this point.  He has had osteomyelitis in the past and is no longer on antibiotics.  He does take an anticoagulant/Xarelto, secondary his atrial fibrillation.   REVIEW OF SYSTEMS  Positives as above. Pertinent negatives include fever, shakes, chills, sweats, nausea, vomiting  All other 10 review of systems are negative    PAST MEDICAL HISTORY  Past Medical History:   Diagnosis Date   • A-fib ([facility])    • Atrial flutter (H[facility])    • GERD (gastroesophageal reflux disease)    • Hypertension    • Neurogenic bladder    • Quadriplegia, C5-C7 complete ([facility])        FAMILY HISTORY  Noncontributory    SOCIAL HISTORY  Social History     Socioeconomic History   • Marital status:      Spouse name: Not on file   • Number of children: Not on file   • Years of education: Not on file   • Highest education level: Not on file   Occupational History   • Not on file   Social Needs   • Financial resource strain: Not on file   • Food insecurity     Worry: Never true     Inability: Never true   • Transportation needs     Medical: No     Non-medical: No   Tobacco Use   • Smoking status: Former Smoker     Packs/day: 1.00     Types: Cigarettes     Start date: 1967     Quit date: 1977     Years since quittin.9   • Smokeless tobacco: Never Used   Substance and Sexual Activity   • Alcohol use: Yes   • Drug use: No   • Sexual activity: Not on file    Lifestyle   • Physical activity     Days per week: Not on file     Minutes per session: Not on file   • Stress: Not on file   Relationships   • Social connections     Talks on phone: Not on file     Gets together: Not on file     Attends Buddhist service: Not on file     Active member of club or organization: Not on file     Attends meetings of clubs or organizations: Not on file     Relationship status: Not on file   • Intimate partner violence     Fear of current or ex partner: Not on file     Emotionally abused: Not on file     Physically abused: Not on file     Forced sexual activity: Not on file   Other Topics Concern   • Not on file   Social History Narrative   • Not on file       SURGICAL HISTORY  Past Surgical History:   Procedure Laterality Date   • ULCER DEBRIDEMENT N/A 8/21/2019    Procedure: debridement of Sacral grade 4 ulcer - W/BONE BIOPSY, 3 liter wash out. bilateral sliding gluteal myocutaneous flap advancement;  Surgeon: Amadeo Moon M.D.;  Location: [facility];  Service: Plastics   • FLAP CLOSURE  8/21/2019    Procedure: CLOSURE, FLAP - MUSCLE;  Surgeon: Amadeo Moon M.D.;  Location: SURGERY [facility];  Service: Plastics   • COLOSTOMY N/A 7/27/2019    Procedure: CREATION, COLOSTOMY -  placement;  Surgeon: Elías Hannah M.D.;  Location: SURGERY [facility];  Service: General   • COLOSTOMY     • COLOSTOMY TAKEDOWN     • HERNIA REPAIR     • PERCUTANEOUOSPINNING LOWER EXTREMITY         CURRENT MEDICATIONS  Home Medications     Reviewed by Humberto Hayden (Pharmacy Tech) on 12/18/20 at 1415  Med List Status: Complete   Medication Last Dose Status   acetaminophen (TYLENOL) 500 MG Tab 12/18/2020 Active   amLODIPine (NORVASC) 5 MG Tab 12/18/2020 Active   ascorbic acid (ASCORBIC ACID) 500 MG Tab 12/18/2020 Active   baclofen (LIORESAL) 20 MG tablet 12/18/2020 Active   Cholecalciferol (VITAMIN D) 2000 UNIT Tab 12/18/2020 Active   dakins 0.125%, 1/4 strength,  "0.125 % Solution NOT TAKING Active   docusate sodium (COLACE) 100 MG Cap 12/18/2020 Active   ferrous sulfate 325 (65 Fe) MG tablet 12/18/2020 Active   fluticasone (FLONASE) 50 MCG/ACT nasal spray PRN Active   losartan (COZAAR) 50 MG Tab 12/17/2020 Active   magnesium oxide (MAG-OX) 400 MG Tab tablet 12/18/2020 Active   metoprolol (LOPRESSOR) 25 MG Tab NOT TAKING Active   Non Formulary Request 12/18/2020 Active   polyethylene glycol/lytes (MIRALAX) 17 g Pack 12/18/2020 Active   Probiotic Product (PROBIOTIC PO) 12/18/2020 Active   rivaroxaban (XARELTO) 10 MG Tab tablet 12/17/2020 Active   sennosides (SENOKOT) 8.6 MG Tab 12/18/2020 Active   therapeutic multivitamin-minerals (THERAGRAN-M) Tab 12/18/2020 Active   traZODone (DESYREL) 50 MG Tab PRN Active                ALLERGIES  Allergies   Allergen Reactions   • Sulfa Drugs Rash     Rash         PHYSICAL EXAM  VITAL SIGNS: BP (!) 98/68   Pulse (!) 58   Temp 37.4 °C (99.3 °F) (Temporal)   Resp 16   Ht 1.778 m (5' 10\")   Wt 89 kg (196 lb 3.4 oz)   SpO2 94%   BMI 28.15 kg/m²      Constitutional: Well developed, Well nourished, No acute distress, Non-toxic appearance.   Eyes: PERRLA, EOMI, Conjunctiva normal, No discharge.   Cardiovascular: Normal heart rate, Normal rhythm, No murmurs, No rubs, No gallops, and intact distal pulses.   Thorax & Lungs:  No respiratory distress, no rales, no rhonchi, No wheezing, No chest wall tenderness.   Skin: Warm, 5 cm decubitus ulcer and another 3 cm decubitus ulcer, full-thickness, ulcers on bilateral medial legs that are dressed  Extremities: Full range of motion, no deformity, no edema.  Neurologic: Alert & oriented x 3, no movement or sensation to his lower extremities bilaterally   psychiatric: Affect normal for clinical presentation.      RADIOLOGY/PROCEDURES  CT-PELVIS WITH   Final Result      Findings consistent with decubitus ulcer overlying the lower sacrum with multiple skin defects and probable associated sacral " osteomyelitis.  Catheter fragment appears to be present in the soft tissues overlying the sacrum and extending into the LEFT    gluteal region.        Results for orders placed or performed during the hospital encounter of 12/18/20   CBC WITH DIFFERENTIAL   Result Value Ref Range    WBC 7.8 4.8 - 10.8 K/uL    RBC 3.65 (L) 4.70 - 6.10 M/uL    Hemoglobin 11.7 (L) 14.0 - 18.0 g/dL    Hematocrit 35.2 (L) 42.0 - 52.0 %    MCV 96.4 81.4 - 97.8 fL    MCH 32.1 27.0 - 33.0 pg    MCHC 33.2 (L) 33.7 - 35.3 g/dL    RDW 48.8 35.9 - 50.0 fL    Platelet Count 259 164 - 446 K/uL    MPV 8.5 (L) 9.0 - 12.9 fL    Neutrophils-Polys 63.20 44.00 - 72.00 %    Lymphocytes 20.70 (L) 22.00 - 41.00 %    Monocytes 12.60 0.00 - 13.40 %    Eosinophils 2.60 0.00 - 6.90 %    Basophils 0.30 0.00 - 1.80 %    Immature Granulocytes 0.60 0.00 - 0.90 %    Nucleated RBC 0.00 /100 WBC    Neutrophils (Absolute) 4.90 1.82 - 7.42 K/uL    Lymphs (Absolute) 1.61 1.00 - 4.80 K/uL    Monos (Absolute) 0.98 (H) 0.00 - 0.85 K/uL    Eos (Absolute) 0.20 0.00 - 0.51 K/uL    Baso (Absolute) 0.02 0.00 - 0.12 K/uL    Immature Granulocytes (abs) 0.05 0.00 - 0.11 K/uL    NRBC (Absolute) 0.00 K/uL   COMP METABOLIC PANEL   Result Value Ref Range    Sodium 135 135 - 145 mmol/L    Potassium 4.4 3.6 - 5.5 mmol/L    Chloride 101 96 - 112 mmol/L    Co2 22 20 - 33 mmol/L    Anion Gap 12.0 7.0 - 16.0    Glucose 93 65 - 99 mg/dL    Bun 11 8 - 22 mg/dL    Creatinine 0.52 0.50 - 1.40 mg/dL    Calcium 9.0 8.4 - 10.2 mg/dL    AST(SGOT) 8 (L) 12 - 45 U/L    ALT(SGPT) 5 2 - 50 U/L    Alkaline Phosphatase 74 30 - 99 U/L    Total Bilirubin 0.3 0.1 - 1.5 mg/dL    Albumin 3.2 3.2 - 4.9 g/dL    Total Protein 7.1 6.0 - 8.2 g/dL    Globulin 3.9 (H) 1.9 - 3.5 g/dL    A-G Ratio 0.8 g/dL   Sed Rate   Result Value Ref Range    Sed Rate [facility] 71 (H) 0 - 20 mm/hour   CRP QUANTITIVE (NON-CARDIAC)   Result Value Ref Range    Stat C-Reactive Protein 4.53 (H) 0.00 - 0.75 mg/dL   PROCALCITONIN    Result Value Ref Range    Procalcitonin 0.31 (H) <0.25 ng/mL   ESTIMATED GFR   Result Value Ref Range    GFR If African American >60 >60 mL/min/1.73 m 2    GFR If Non African American >60 >60 mL/min/1.73 m 2         COURSE & MEDICAL DECISION MAKING  Pertinent Labs & Imaging studies reviewed. (See chart for details)  This is a pleasant 70-year-old gentleman that presents with severe decubitus ulceration.  CT scan of the pelvis IV contrast was completed reveals evidence of large ulceration deep down into the coccyx and sacral region and possible osteomyelitis.  The patient has no evidence of necrotizing fasciitis requiring emergent surgical invention.  The patient received Zosyn IV here in the emergency department.  Discussed the patient with Dr. Ortega who states he will be hospitalized the patient for further evaluation and management and involving wound care management.      FINAL IMPRESSION  Sacral osteomyelitis  Decubitus ulceration x2         Electronically signed by: Donald Swanson D.O., 12/18/2020 10:37 PM

## 2020-12-19 NOTE — DIETARY
[employer]    Alert received for newly identified wound. Wound team consult pending, will await current wound staging to make recommendations if appropriate. RD will monitor per dept policy.

## 2020-12-19 NOTE — DISCHARGE PLANNING
Received Transport Form @ 4121  Spoke to Danuta @ [facility]    Transport is scheduled for 12/19 @1630 going to [facility] (T406/00) .

## 2020-12-19 NOTE — ED NOTES
Report Given to Brian MENESES. [facility]

## 2020-12-19 NOTE — DISCHARGE PLANNING
Dr. Goldman informed LSW that pt will need to be transferred to [facility]. MD has notified transfer center. LSW contacted transfer center and spoke with Mandy who reports they are waiting for pt's covid results then will informed LSW about details for transfer.

## 2020-12-19 NOTE — H&P
Hospital Medicine History & Physical Note    Date of Service  12/19/2020    Primary Care Physician  Juancarlos Moser N.P.    Consultants  Infection Disease: Dr. Koo   Plastic surgery: Dr. Matt Cummins     Code Status  Full Code    Chief Complaint  Infected decubitus ulcer and possible sacrum osteomyelitis     History of Presenting Illness  70 y.o. male history of C5 C7 complete paraplegia, decubitus ulcers s/p plastic flap surgery by Dr. Moon, neurogenic bladder with indwelling Cazares catheter which was changed on Wednesday, who presented 12/18/2020 with worsening of decubitus wound.  Patient was referred to emergency department here at [facility] by his home health wound nurse with worsening sacral decubitus wound.  The wounds has been becoming deeper, larger, there was copious serosanguineous discharge and foul smell.  Relation with CT of the abdomen pelvis with contrast showed decubitus ulcer overlying the lower sacrum with multiple skin defects and probable sacral osteomyelitis.  Pt follows with Dr. Moon his plastic surgeon who did his flap in August of 2019.   Here Pt seen by wound care and recommending for plastic evaluation.  I have discussed case case with Dr. Cummins who kindly agreed to evaluate pt. I will transfer pt to [facility] for plastic surgery evaluation also Infection disease has been consulted.     Review of Systems  Review of Systems   Constitutional: Negative for chills and fever.   HENT: Negative for ear discharge and ear pain.    Respiratory: Negative for cough and shortness of breath.    Cardiovascular: Negative for chest pain and palpitations.   Gastrointestinal: Negative for abdominal pain, nausea and vomiting.   Genitourinary: Negative for dysuria and urgency.   Musculoskeletal: Negative for myalgias and neck pain.   All other systems reviewed and are negative.      Past Medical History   has a past medical history of A-fib (HCC), Atrial flutter (HCC), GERD (gastroesophageal  reflux disease), Hypertension, Neurogenic bladder, and Quadriplegia, C5-C7 complete ([facility]).    Surgical History   has a past surgical history that includes percutaneouospinning lower extremity; colostomy; colostomy takedown; colostomy (N/A, 7/27/2019); ulcer debridement (N/A, 8/21/2019); flap closure (8/21/2019); and hernia repair.     Family History  family history includes Heart Disease in his father.     Social History   reports that he quit smoking about 43 years ago. His smoking use included cigarettes. He started smoking about 54 years ago. He smoked 1.00 pack per day. He has never used smokeless tobacco. He reports current alcohol use. He reports that he does not use drugs.    Allergies  Allergies   Allergen Reactions   • Sulfa Drugs Rash     Rash         Medications  Cannot display prior to admission medications because the patient has not been admitted in this contact.       Physical Exam  BP: ()/()     Physical Exam  Vitals signs and nursing note reviewed.   HENT:      Head: Normocephalic.      Nose: No congestion.   Eyes:      General: No scleral icterus.        Left eye: No discharge.   Cardiovascular:      Rate and Rhythm: Normal rate.      Heart sounds: No gallop.    Pulmonary:      Effort: Pulmonary effort is normal. No respiratory distress.      Breath sounds: Normal breath sounds. No wheezing.   Abdominal:      General: There is no distension.      Tenderness: There is no abdominal tenderness. There is no guarding.   Skin:     Comments: Sacral decubitus ulcer    Neurological:      General: No focal deficit present.      Mental Status: He is alert. Mental status is at baseline.      Cranial Nerves: No cranial nerve deficit.         Laboratory:  Recent Labs     12/18/20  1425 12/19/20  0423   WBC 7.8 8.4   RBC 3.65* 3.31*   HEMOGLOBIN 11.7* 10.8*   HEMATOCRIT 35.2* 33.0*   MCV 96.4 99.7*   MCH 32.1 32.6   MCHC 33.2* 32.7*   RDW 48.8 50.4*   PLATELETCT 259 234   MPV 8.5* 8.5*     Recent Labs      12/18/20  1425 12/19/20  0423   SODIUM 135 138   POTASSIUM 4.4 4.4   CHLORIDE 101 104   CO2 22 24   GLUCOSE 93 101*   BUN 11 13   CREATININE 0.52 0.66   CALCIUM 9.0 8.7     Recent Labs     12/18/20  1425 12/19/20  0423   ALTSGPT 5 5   ASTSGOT 8* 8*   ALKPHOSPHAT 74 66   TBILIRUBIN 0.3 0.3   GLUCOSE 93 101*         No results for input(s): NTPROBNP in the last 72 hours.      No results for input(s): TROPONINT in the last 72 hours.    Imaging:  No orders to display         Assessment/Plan:  I anticipate this patient is appropriate for observation status at this time.    * Decubitus ulcer- (present on admission)  Assessment & Plan  Concern for sacral osteomyelitis according to CT of the pelvis report.  MRI is not possible due to metal rods in his lower extremities  Patient was started on empiric antibiotics  Infection disease has been consulted   Wound care also consulted and recommended for evaluation by plastic surgery as might need debridement and possible wound vac.   Plastic surgery Dr. Cummins has been consulted and will evaluate pt appreciate rec.  Will transfer pt to [facility] for plastic surgery evaluation.     Neurogenic bladder- (present on admission)  Assessment & Plan  Cazares catheter was last changed on Wednesday.      Essential hypertension- (present on admission)  Assessment & Plan  Continue home medications  Monitor blood pressure    Paroxysmal atrial flutter (HCC)- (present on admission)  Assessment & Plan  Heart rate is under control  We will hold Xarelto for possible debridement  Continue metoprolol      VTE: SCD

## 2020-12-19 NOTE — DISCHARGE PLANNING
Transport is scheduled.   MD aware & agreeable.   Bedside RN aware & given cobra form. RN to call report.  RN reports pt is oriented x4 and [facility]     LSW again left message with [facility] [phone] and also called pt's daughter listed on face sheet, Geovany Skinner [phone] with no success. LSW did leave message about pt's transfer.     Addendum: April returned LSW's call and mentioned that her and all of Osvaldo's family are aware of his admission & transfer. April also mentioned that [facility] (pt is on service with them) is also aware of [facility] admission & [facility]. No additional needs at this time.

## 2020-12-19 NOTE — PROGRESS NOTES
"Pharmacy Kinetics 70 y.o. male on vancomycin day # 1 2020    Currently on Vancomycin 2250 mg IV x 1 at 1907, then 1500 mg iv q24hr (1900)  Provider specified end date: None ordered, 3 day stop on current dose    Indication for Treatment: Sacral decubitus wound infection/sacral osteomyelitis    Pertinent history per medical record: Admitted on 2020 for worsening sacral decubitus wound with drainage and foul smell.  2020 CT of abd/pelvis - decubitus ulcer overlying the lower sacrum with multiple skin defects and probable associated sacral osteomyelitis. Previous wound 2019 (+) Staph epidermidis and 2019 (+) Streptococcus constellatus/milleri, MRSA and Bactroides fragilis. Plans to consult ID and plastics.     PMH: Quadriplegia, Afib, Neurogenic Bladder    Other antibiotics: Zosyn 3.375 g IV q8 hours (extended infusion)    Allergies: Sulfa drugs     List concerns for renal function: Quadriplegic, CT with contrast    Pertinent cultures to date:   Wound and urine cultures ordered    MRSA nares swab if pneumonia is a concern: N/a    Recent Labs     20  1425   WBC 7.8   NEUTSPOLYS 63.20     Recent Labs     20  1425   BUN 11   CREATININE 0.52   ALBUMIN 3.2     BP (!) 98/68   Pulse (!) 58   Temp 37.4 °C (99.3 °F) (Temporal)   Resp 16   Ht 1.778 m (5' 10\")   Wt 89 kg (196 lb 3.4 oz)   SpO2 94%  Temp (24hrs), Av.4 °C (99.3 °F), Min:37.4 °C (99.3 °F), Max:37.4 °C (99.3 °F)      A/P   1. Vancomycin dose change: Vancomycin 2250 mg IV x 1 at 1907. Will start Vancomycin 1500 mg IV q24 hours (~17 mg/kg).   2. Next vancomycin level: Trough prior to 3rd total dose no  at 1830.   3. Goal trough: ~16 mcg/mL (12-16)  4. Comments: ID and plastics to be consulted. F/u recommendations. Patient quadriplegic and received IV contrast today. Monitor urine output and for accumulation of vancomycin.    Sherri Clemons, PharmD      "

## 2020-12-19 NOTE — PROGRESS NOTES
Charge RN note: pt presenting with worsening decubitus ulcer to sacrum and open wounds to L heel and leg.   Pictures taken and uploaded in pt's chart; dressing applied   Wound care consult initiated by MD   Waffle mattress also in place

## 2020-12-20 ENCOUNTER — ANESTHESIA (OUTPATIENT)
Dept: SURGERY | Facility: MEDICAL CENTER | Age: 70
DRG: 579 | End: 2020-12-20
Payer: MEDICARE

## 2020-12-20 ENCOUNTER — ANESTHESIA EVENT (OUTPATIENT)
Dept: SURGERY | Facility: MEDICAL CENTER | Age: 70
DRG: 579 | End: 2020-12-20
Payer: MEDICARE

## 2020-12-20 LAB
EKG IMPRESSION: NORMAL
GRAM STN SPEC: NORMAL
SIGNIFICANT IND 70042: NORMAL
SITE SITE: NORMAL
SOURCE SOURCE: NORMAL
VANCOMYCIN TROUGH SERPL-MCNC: 7.2 UG/ML (ref 10–20)

## 2020-12-20 PROCEDURE — 700102 HCHG RX REV CODE 250 W/ 637 OVERRIDE(OP): Performed by: INTERNAL MEDICINE

## 2020-12-20 PROCEDURE — 700105 HCHG RX REV CODE 258: Performed by: INTERNAL MEDICINE

## 2020-12-20 PROCEDURE — 700111 HCHG RX REV CODE 636 W/ 250 OVERRIDE (IP): Performed by: INTERNAL MEDICINE

## 2020-12-20 PROCEDURE — A9270 NON-COVERED ITEM OR SERVICE: HCPCS | Performed by: INTERNAL MEDICINE

## 2020-12-20 PROCEDURE — A9270 NON-COVERED ITEM OR SERVICE: HCPCS | Performed by: STUDENT IN AN ORGANIZED HEALTH CARE EDUCATION/TRAINING PROGRAM

## 2020-12-20 PROCEDURE — 87205 SMEAR GRAM STAIN: CPT

## 2020-12-20 PROCEDURE — 93010 ELECTROCARDIOGRAM REPORT: CPT | Performed by: INTERNAL MEDICINE

## 2020-12-20 PROCEDURE — 160048 HCHG OR STATISTICAL LEVEL 1-5: Performed by: SURGERY

## 2020-12-20 PROCEDURE — 770001 HCHG ROOM/CARE - MED/SURG/GYN PRIV*

## 2020-12-20 PROCEDURE — 160038 HCHG SURGERY MINUTES - EA ADDL 1 MIN LEVEL 2: Performed by: SURGERY

## 2020-12-20 PROCEDURE — 80202 ASSAY OF VANCOMYCIN: CPT

## 2020-12-20 PROCEDURE — 0QB10ZX EXCISION OF SACRUM, OPEN APPROACH, DIAGNOSTIC: ICD-10-PCS | Performed by: SURGERY

## 2020-12-20 PROCEDURE — 93005 ELECTROCARDIOGRAM TRACING: CPT | Performed by: SURGERY

## 2020-12-20 PROCEDURE — 307059 PAD,EAR PROTECTOR: Performed by: STUDENT IN AN ORGANIZED HEALTH CARE EDUCATION/TRAINING PROGRAM

## 2020-12-20 PROCEDURE — 700105 HCHG RX REV CODE 258: Performed by: ANESTHESIOLOGY

## 2020-12-20 PROCEDURE — 96366 THER/PROPH/DIAG IV INF ADDON: CPT

## 2020-12-20 PROCEDURE — 87075 CULTR BACTERIA EXCEPT BLOOD: CPT

## 2020-12-20 PROCEDURE — 700102 HCHG RX REV CODE 250 W/ 637 OVERRIDE(OP): Performed by: STUDENT IN AN ORGANIZED HEALTH CARE EDUCATION/TRAINING PROGRAM

## 2020-12-20 PROCEDURE — 302146: Performed by: STUDENT IN AN ORGANIZED HEALTH CARE EDUCATION/TRAINING PROGRAM

## 2020-12-20 PROCEDURE — 160035 HCHG PACU - 1ST 60 MINS PHASE I: Performed by: SURGERY

## 2020-12-20 PROCEDURE — 501838 HCHG SUTURE GENERAL: Performed by: SURGERY

## 2020-12-20 PROCEDURE — 87186 SC STD MICRODIL/AGAR DIL: CPT

## 2020-12-20 PROCEDURE — 0KBP0ZZ EXCISION OF LEFT HIP MUSCLE, OPEN APPROACH: ICD-10-PCS | Performed by: SURGERY

## 2020-12-20 PROCEDURE — 700111 HCHG RX REV CODE 636 W/ 250 OVERRIDE (IP): Performed by: ANESTHESIOLOGY

## 2020-12-20 PROCEDURE — 160002 HCHG RECOVERY MINUTES (STAT): Performed by: SURGERY

## 2020-12-20 PROCEDURE — 0KBN0ZZ EXCISION OF RIGHT HIP MUSCLE, OPEN APPROACH: ICD-10-PCS | Performed by: SURGERY

## 2020-12-20 PROCEDURE — 160009 HCHG ANES TIME/MIN: Performed by: SURGERY

## 2020-12-20 PROCEDURE — 302098 PASTE RING (FLAT): Performed by: STUDENT IN AN ORGANIZED HEALTH CARE EDUCATION/TRAINING PROGRAM

## 2020-12-20 PROCEDURE — 160027 HCHG SURGERY MINUTES - 1ST 30 MINS LEVEL 2: Performed by: SURGERY

## 2020-12-20 PROCEDURE — 700101 HCHG RX REV CODE 250: Performed by: ANESTHESIOLOGY

## 2020-12-20 PROCEDURE — 87070 CULTURE OTHR SPECIMN AEROBIC: CPT | Mod: 91

## 2020-12-20 PROCEDURE — 99232 SBSQ HOSP IP/OBS MODERATE 35: CPT | Performed by: STUDENT IN AN ORGANIZED HEALTH CARE EDUCATION/TRAINING PROGRAM

## 2020-12-20 PROCEDURE — 36415 COLL VENOUS BLD VENIPUNCTURE: CPT

## 2020-12-20 PROCEDURE — 87077 CULTURE AEROBIC IDENTIFY: CPT

## 2020-12-20 RX ORDER — OXYCODONE HCL 5 MG/5 ML
5 SOLUTION, ORAL ORAL
Status: DISCONTINUED | OUTPATIENT
Start: 2020-12-20 | End: 2020-12-20 | Stop reason: HOSPADM

## 2020-12-20 RX ORDER — LABETALOL HYDROCHLORIDE 5 MG/ML
5 INJECTION, SOLUTION INTRAVENOUS
Status: DISCONTINUED | OUTPATIENT
Start: 2020-12-20 | End: 2020-12-20 | Stop reason: HOSPADM

## 2020-12-20 RX ORDER — SODIUM CHLORIDE, SODIUM LACTATE, POTASSIUM CHLORIDE, CALCIUM CHLORIDE 600; 310; 30; 20 MG/100ML; MG/100ML; MG/100ML; MG/100ML
INJECTION, SOLUTION INTRAVENOUS CONTINUOUS
Status: DISCONTINUED | OUTPATIENT
Start: 2020-12-20 | End: 2020-12-20 | Stop reason: HOSPADM

## 2020-12-20 RX ORDER — OXYCODONE HCL 5 MG/5 ML
10 SOLUTION, ORAL ORAL
Status: DISCONTINUED | OUTPATIENT
Start: 2020-12-20 | End: 2020-12-20 | Stop reason: HOSPADM

## 2020-12-20 RX ORDER — ONDANSETRON 2 MG/ML
INJECTION INTRAMUSCULAR; INTRAVENOUS PRN
Status: DISCONTINUED | OUTPATIENT
Start: 2020-12-20 | End: 2020-12-20 | Stop reason: SURG

## 2020-12-20 RX ORDER — DOCUSATE SODIUM 100 MG/1
100 CAPSULE, LIQUID FILLED ORAL 2 TIMES DAILY
Status: DISCONTINUED | OUTPATIENT
Start: 2020-12-20 | End: 2020-12-23

## 2020-12-20 RX ORDER — HYDROMORPHONE HYDROCHLORIDE 1 MG/ML
0.1 INJECTION, SOLUTION INTRAMUSCULAR; INTRAVENOUS; SUBCUTANEOUS
Status: DISCONTINUED | OUTPATIENT
Start: 2020-12-20 | End: 2020-12-20 | Stop reason: HOSPADM

## 2020-12-20 RX ORDER — SODIUM CHLORIDE, SODIUM LACTATE, POTASSIUM CHLORIDE, CALCIUM CHLORIDE 600; 310; 30; 20 MG/100ML; MG/100ML; MG/100ML; MG/100ML
INJECTION, SOLUTION INTRAVENOUS
Status: DISCONTINUED | OUTPATIENT
Start: 2020-12-20 | End: 2020-12-20 | Stop reason: SURG

## 2020-12-20 RX ORDER — HYDROMORPHONE HYDROCHLORIDE 1 MG/ML
0.2 INJECTION, SOLUTION INTRAMUSCULAR; INTRAVENOUS; SUBCUTANEOUS
Status: DISCONTINUED | OUTPATIENT
Start: 2020-12-20 | End: 2020-12-20 | Stop reason: HOSPADM

## 2020-12-20 RX ORDER — HYDRALAZINE HYDROCHLORIDE 20 MG/ML
5 INJECTION INTRAMUSCULAR; INTRAVENOUS
Status: DISCONTINUED | OUTPATIENT
Start: 2020-12-20 | End: 2020-12-20 | Stop reason: HOSPADM

## 2020-12-20 RX ORDER — ONDANSETRON 2 MG/ML
4 INJECTION INTRAMUSCULAR; INTRAVENOUS
Status: DISCONTINUED | OUTPATIENT
Start: 2020-12-20 | End: 2020-12-20 | Stop reason: HOSPADM

## 2020-12-20 RX ORDER — DIPHENHYDRAMINE HYDROCHLORIDE 50 MG/ML
12.5 INJECTION INTRAMUSCULAR; INTRAVENOUS
Status: DISCONTINUED | OUTPATIENT
Start: 2020-12-20 | End: 2020-12-20 | Stop reason: HOSPADM

## 2020-12-20 RX ORDER — MEPERIDINE HYDROCHLORIDE 25 MG/ML
12.5 INJECTION INTRAMUSCULAR; INTRAVENOUS; SUBCUTANEOUS
Status: DISCONTINUED | OUTPATIENT
Start: 2020-12-20 | End: 2020-12-20 | Stop reason: HOSPADM

## 2020-12-20 RX ORDER — HALOPERIDOL 5 MG/ML
1 INJECTION INTRAMUSCULAR
Status: DISCONTINUED | OUTPATIENT
Start: 2020-12-20 | End: 2020-12-20 | Stop reason: HOSPADM

## 2020-12-20 RX ORDER — METOPROLOL TARTRATE 1 MG/ML
1 INJECTION, SOLUTION INTRAVENOUS
Status: DISCONTINUED | OUTPATIENT
Start: 2020-12-20 | End: 2020-12-20 | Stop reason: HOSPADM

## 2020-12-20 RX ORDER — HYDROMORPHONE HYDROCHLORIDE 1 MG/ML
0.4 INJECTION, SOLUTION INTRAMUSCULAR; INTRAVENOUS; SUBCUTANEOUS
Status: DISCONTINUED | OUTPATIENT
Start: 2020-12-20 | End: 2020-12-20 | Stop reason: HOSPADM

## 2020-12-20 RX ORDER — LIDOCAINE HYDROCHLORIDE 20 MG/ML
INJECTION, SOLUTION EPIDURAL; INFILTRATION; INTRACAUDAL; PERINEURAL PRN
Status: DISCONTINUED | OUTPATIENT
Start: 2020-12-20 | End: 2020-12-20 | Stop reason: SURG

## 2020-12-20 RX ADMIN — SUGAMMADEX 200 MG: 100 INJECTION, SOLUTION INTRAVENOUS at 16:34

## 2020-12-20 RX ADMIN — ROCURONIUM BROMIDE 50 MG: 10 INJECTION, SOLUTION INTRAVENOUS at 15:40

## 2020-12-20 RX ADMIN — BACLOFEN 20 MG: 10 TABLET ORAL at 20:43

## 2020-12-20 RX ADMIN — LIDOCAINE HYDROCHLORIDE 100 MG: 20 INJECTION, SOLUTION EPIDURAL; INFILTRATION; INTRACAUDAL at 15:40

## 2020-12-20 RX ADMIN — VANCOMYCIN HYDROCHLORIDE 1500 MG: 500 INJECTION, POWDER, LYOPHILIZED, FOR SOLUTION INTRAVENOUS at 19:25

## 2020-12-20 RX ADMIN — PROPOFOL 100 MG: 10 INJECTION, EMULSION INTRAVENOUS at 15:40

## 2020-12-20 RX ADMIN — PIPERACILLIN SODIUM, TAZOBACTAM SODIUM 3.38 G: 3; .375 INJECTION, POWDER, LYOPHILIZED, FOR SOLUTION INTRAVENOUS at 22:22

## 2020-12-20 RX ADMIN — PIPERACILLIN SODIUM, TAZOBACTAM SODIUM 3.38 G: 3; .375 INJECTION, POWDER, LYOPHILIZED, FOR SOLUTION INTRAVENOUS at 13:42

## 2020-12-20 RX ADMIN — PIPERACILLIN SODIUM, TAZOBACTAM SODIUM 3.38 G: 3; .375 INJECTION, POWDER, LYOPHILIZED, FOR SOLUTION INTRAVENOUS at 06:05

## 2020-12-20 RX ADMIN — FENTANYL CITRATE 50 MCG: 50 INJECTION, SOLUTION INTRAMUSCULAR; INTRAVENOUS at 15:40

## 2020-12-20 RX ADMIN — FERROUS SULFATE TAB 325 MG (65 MG ELEMENTAL FE) 325 MG: 325 (65 FE) TAB at 17:49

## 2020-12-20 RX ADMIN — DOCUSATE SODIUM 100 MG: 100 CAPSULE ORAL at 17:49

## 2020-12-20 RX ADMIN — SODIUM CHLORIDE, POTASSIUM CHLORIDE, SODIUM LACTATE AND CALCIUM CHLORIDE: 600; 310; 30; 20 INJECTION, SOLUTION INTRAVENOUS at 15:34

## 2020-12-20 RX ADMIN — DOCUSATE SODIUM 50 MG AND SENNOSIDES 8.6 MG 2 TABLET: 8.6; 5 TABLET, FILM COATED ORAL at 17:50

## 2020-12-20 RX ADMIN — ONDANSETRON 4 MG: 2 INJECTION INTRAMUSCULAR; INTRAVENOUS at 16:34

## 2020-12-20 RX ADMIN — FENTANYL CITRATE 50 MCG: 50 INJECTION, SOLUTION INTRAMUSCULAR; INTRAVENOUS at 16:24

## 2020-12-20 ASSESSMENT — CHA2DS2 SCORE
CHA2DS2 VASC SCORE: 1
CHF OR LEFT VENTRICULAR DYSFUNCTION: NO
DIABETES: NO
SEX: MALE
VASCULAR DISEASE: NO
DIABETES: NO
AGE 65 TO 74: YES
AGE 75 OR GREATER: NO
HYPERTENSION: YES
SEX: MALE
CHA2DS2 VASC SCORE: 2
PRIOR STROKE OR TIA OR THROMBOEMBOLISM: NO
AGE 75 OR GREATER: NO
AGE 65 TO 74: YES

## 2020-12-20 ASSESSMENT — ENCOUNTER SYMPTOMS
HEARTBURN: 0
FEVER: 0
PALPITATIONS: 0
DEPRESSION: 0
PND: 0
SENSORY CHANGE: 1
COUGH: 0
SORE THROAT: 0
VOMITING: 0
SHORTNESS OF BREATH: 0
ORTHOPNEA: 0
CHILLS: 0
NAUSEA: 0
MYALGIAS: 0
DOUBLE VISION: 0
HEADACHES: 0
BLURRED VISION: 0
ABDOMINAL PAIN: 0
DIZZINESS: 0
NECK PAIN: 0
FOCAL WEAKNESS: 0
SPUTUM PRODUCTION: 0

## 2020-12-20 ASSESSMENT — COGNITIVE AND FUNCTIONAL STATUS - GENERAL
CLIMB 3 TO 5 STEPS WITH RAILING: TOTAL
WALKING IN HOSPITAL ROOM: TOTAL
DAILY ACTIVITIY SCORE: 11
HELP NEEDED FOR BATHING: TOTAL
TURNING FROM BACK TO SIDE WHILE IN FLAT BAD: A LOT
DRESSING REGULAR LOWER BODY CLOTHING: A LOT
STANDING UP FROM CHAIR USING ARMS: TOTAL
SUGGESTED CMS G CODE MODIFIER DAILY ACTIVITY: CL
MOVING TO AND FROM BED TO CHAIR: A LOT
EATING MEALS: A LOT
DRESSING REGULAR UPPER BODY CLOTHING: A LOT
MOBILITY SCORE: 9
MOVING FROM LYING ON BACK TO SITTING ON SIDE OF FLAT BED: A LOT
PERSONAL GROOMING: A LOT
SUGGESTED CMS G CODE MODIFIER MOBILITY: CM
TOILETING: A LOT

## 2020-12-20 ASSESSMENT — FIBROSIS 4 INDEX: FIB4 SCORE: 1.07

## 2020-12-20 ASSESSMENT — PAIN DESCRIPTION - PAIN TYPE
TYPE: ACUTE PAIN;SURGICAL PAIN
TYPE: ACUTE PAIN

## 2020-12-20 ASSESSMENT — PAIN SCALES - GENERAL: PAIN_LEVEL: 0

## 2020-12-20 NOTE — PROGRESS NOTES
Assumed care of patient at 0645. Assessment complete.  AA&Ox4. Denies CP/SOB.  Reporting pain is controlled.   Wounds covered with dressings.   NPO at this time  Cazares in place draining to gravity. Colostomy to LUQ.  Patient is paraplegic. Q2 turns in place. Low airloss mattress in place. Heel float boots in place.    All needs met at this time. Call light within reach. Pt calls appropriately. Will continue to monitor.

## 2020-12-20 NOTE — WOUND TEAM
Pt assessed by wound RN 12/19, prior to transfer to [facility]. Pending I&D per plastics eval. Nursing to place wet-to-dry dressing until Pt goes to OR.

## 2020-12-20 NOTE — CARE PLAN
Problem: Communication  Goal: The ability to communicate needs accurately and effectively will improve  Outcome: PROGRESSING AS EXPECTED  Note: Reviewed POC with patient. All questions and concerns addressed at this time.       Problem: Safety  Goal: Will remain free from injury  Outcome: PROGRESSING AS EXPECTED  Note: Patient reoriented to fall precautions. Using call light appropriately. Bed low/locked. Patient remains free from injury. Placed fall risk band and signage on door.

## 2020-12-20 NOTE — ANESTHESIA PREPROCEDURE EVALUATION
Relevant Problems   CARDIAC   (+) Essential hypertension   (+) Paroxysmal atrial flutter (HCC)   quadriplegic    Physical Exam    Airway   Mallampati: II  TM distance: >3 FB  Neck ROM: full       Cardiovascular - normal exam  Rhythm: regular  Rate: normal  (-) murmur     Dental - normal exam           Pulmonary - normal exam  Breath sounds clear to auscultation     Abdominal    Neurological - normal exam                 Anesthesia Plan    ASA 3 (a-fib)   ASA physical status 3 criteria: other (comment)    Plan - general       Airway plan will be ETT      Plan Factors:   Patient did not smoke on day of procedure.      Induction: intravenous    Postoperative Plan: Postoperative administration of opioids is intended.    Pertinent diagnostic labs and testing reviewed    Informed Consent:    Anesthetic plan and risks discussed with patient.    Use of blood products discussed with: patient whom consented to blood products.

## 2020-12-20 NOTE — PROGRESS NOTES
PLASTICS    Patient seen and examined, confirmed need for surgery.  All questions answered. Will proceed with Irrigation and Debridement of Sacral Pressure Ulcer.
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
## 2020-12-20 NOTE — DIETARY
"Nutrition services: Day 0 of admit.  Osvaldo Pate is a 70 y.o. male with admitting DX of decubitus ulcer.    Consult received for >stage 3 pressure ulcer, oral nutrition supplements per admit screen. Spoke with pt at bedside. Pt reports to consuming Premier protein nutrition drinks TID. Discussed alternative oral nutrition supplements with pt as premier protein is not available. Pt agreeable to Boost Plus to optimize intake and protein to aid in wound healing.     Assessment:  Height: 177.8 cm (5' 10\")  Weight: 89 kg (196 lb 3.4 oz)  Body mass index is 28.15 kg/m²., BMI classification: overweight  Diet/Intake: NPO pending procedure    Evaluation:   1. Hx of  C7 complete paraplegia with decubitus ulcers, s/p flap surgery (8/19).  2. NPO pending debridement and washout of sacral pressure ulcer.    Malnutrition Risk: Does not meet criteria per ASPEN guidelines at this time.    Recommendations/Plan:  1. Advance diet as tolerated per MD.  2. Add Boost Plus BID.  3. Encourage intake of meals and supplements.  4. Document intake of all meals and supplements as % taken in ADLs to provide interdisciplinary communication across all shifts.   5. Monitor weight.  6. Nutrition rep will continue to see patient for ongoing meal and snack preferences.     RD will continue to follow per dept guidelines.      "

## 2020-12-20 NOTE — PROGRESS NOTES
Hospital Medicine Daily Progress Note    Date of Service  12/20/2020    Chief Complaint  70 y.o. male admitted 12/19/2020 with Infected decubitus ulcer and possible sacrum osteomyelitis     Hospital Course  70 y.o. male with C5-C7 complete paraplegia, decubitus ulcers s/p plastic flap surgery by Dr. Moon, neurogenic bladder with indwelling Hutchins catheter which was changed on Wednesday (12/16) who presented 12/18/2020 with worsening of decubitus wound.  Patient was referred to ED at [facility] by his home health wound nurse with worsening sacral decubitus wound. The wounds has been becoming deeper and larger with copious serosanguineous discharge and foul smell. Relation with CT of the abdomen pelvis with contrast showed decubitus ulcer overlying the lower sacrum with multiple skin defects and probable sacral osteomyelitis.  Pt follows with Dr. Moon his plastic surgeon who did his flap in August of 2019. At [facility], Pt was seen by wound care and recommending for plastic evaluation. Pt was evaluated by Dr. Cummins plastic surgery and then transferred to [facility] for [place]. ID was consulted on admission for sacrum osteomyelitis.    Interval Problem Update  Vitals reviewed; afebrile.  The rest of the vitals within normal parameters except HR in high 40s-50s.  Pain scale rated about 0 in last 12hrs.    At bedside, no acute complain from him. He understands he will be going to OR soon. He wonders if he can be evaluated for a suprapubic catheter as he has had an indwelling hutchins catheter since 3/2019. He has had a few UTI with sepsis.     Labs reviewed  No leukocytosis  Hb 10.8  Cr 0.66  ESR 71, Cr 4.53    Consultants/Specialty  ID - Dr. Koo  Plastic Surgery - Dr. Cummins     Code Status  Full Code    Disposition  Likely back to group home when medically stable    Discussed with patient, patient's nurse and with multidisciplinary team during rounds including , pharmacist and charge nurse.      Review of Systems  Review of  Systems   Constitutional: Negative for chills, fever and malaise/fatigue.   HENT: Negative for congestion and sore throat.    Eyes: Negative for blurred vision and double vision.   Respiratory: Negative for cough, sputum production and shortness of breath.    Cardiovascular: Negative for chest pain, palpitations, orthopnea, leg swelling and PND.   Gastrointestinal: Negative for abdominal pain, heartburn, nausea and vomiting.   Genitourinary:        Retention   Musculoskeletal: Negative for myalgias and neck pain.   Neurological: Positive for sensory change (chronic). Negative for dizziness, focal weakness and headaches.   Psychiatric/Behavioral: Negative for depression.        Physical Exam  Temp:  [36.4 °C (97.6 °F)-37.2 °C (99 °F)] 36.4 °C (97.6 °F)  Pulse:  [46-55] 46  Resp:  [16-20] 16  BP: ()/(59-61) 112/61  SpO2:  [93 %-97 %] 96 %    Physical Exam  Vitals signs and nursing note reviewed.   Constitutional:       General: He is not in acute distress.     Appearance: Normal appearance. He is not ill-appearing.   HENT:      Head: Normocephalic and atraumatic.      Mouth/Throat:      Mouth: Mucous membranes are moist.      Pharynx: Oropharynx is clear.   Eyes:      General: No scleral icterus.     Conjunctiva/sclera: Conjunctivae normal.   Cardiovascular:      Rate and Rhythm: Normal rate and regular rhythm.      Pulses: Normal pulses.      Heart sounds: Normal heart sounds.   Pulmonary:      Effort: Pulmonary effort is normal. No respiratory distress.      Breath sounds: Normal breath sounds. No wheezing.   Abdominal:      General: Bowel sounds are normal. There is no distension.      Palpations: Abdomen is soft.      Tenderness: There is no abdominal tenderness.   Musculoskeletal:         General: No swelling or tenderness.   Skin:     General: Skin is warm and dry.      Capillary Refill: Capillary refill takes less than 2 seconds.      Comments: Sacral decubitus ulcer (wound images reviewed in epic)    Neurological:      General: No focal deficit present.      Mental Status: He is alert and oriented to person, place, and time. Mental status is at baseline.   Psychiatric:         Mood and Affect: Mood normal.         Fluids    Intake/Output Summary (Last 24 hours) at 12/20/2020 1412  Last data filed at 12/20/2020 0815  Gross per 24 hour   Intake 0 ml   Output 1675 ml   Net -1675 ml       Laboratory  Recent Labs     12/18/20  1425 12/19/20  0423   WBC 7.8 8.4   RBC 3.65* 3.31*   HEMOGLOBIN 11.7* 10.8*   HEMATOCRIT 35.2* 33.0*   MCV 96.4 99.7*   MCH 32.1 32.6   MCHC 33.2* 32.7*   RDW 48.8 50.4*   PLATELETCT 259 234   MPV 8.5* 8.5*     Recent Labs     12/18/20  1425 12/19/20  0423   SODIUM 135 138   POTASSIUM 4.4 4.4   CHLORIDE 101 104   CO2 22 24   GLUCOSE 93 101*   BUN 11 13   CREATININE 0.52 0.66   CALCIUM 9.0 8.7                   Imaging  CT abd/pelvis:   Findings consistent with decubitus ulcer overlying the lower sacrum with multiple skin defects and probable associated sacral osteomyelitis.  Catheter fragment appears to be present in the soft tissues overlying the sacrum and extending into the LEFT   gluteal region.          Assessment/Plan  * Decubitus ulcer- (present on admission)  Assessment & Plan  Concern for sacral osteomyelitis according to CT of the pelvis report.  MRI is not possible due to metal rods in his lower extremities  Patient has been started on empiric antibiotics  Infection disease appreciated - continue zosyn at the moment and then will direct abx to what is cultures in surgery.  Wound care also consulted  Plastic surgery Dr. Cummins has been consulted - plan for OR today    CM to assist with getting a proper working bed at home as well as continue home health support  Anticipate the patient requiring 6 weeks of IV Abx; will place PICC line in AM      Neurogenic bladder- (present on admission)  Assessment & Plan  Hutchins catheter was last changed on Wednesday.  Chronic indwelling hutchins  catheter due to neurogenic bladder with intermittent cath changes  Pt wonders if he can get a suprapubic catheter placement and [place]  Will consider contacting urology in AM      Essential hypertension- (present on admission)  Assessment & Plan  Continue home medications  Monitor blood pressure    Paroxysmal atrial flutter (HCC)- (present on admission)  Assessment & Plan  Heart rate is under control  We will hold Xarelto for debridement today; will restart in AM  Continue metoprolol       VTE prophylaxis: Xarelto on hold for OR; will restart in AM

## 2020-12-20 NOTE — PROGRESS NOTES
ID NOTE    Patient is known to us from [facility].    Asked to see this patient in regards to decubitus ulcer with infection requiring plastics evaluation. NGTD on culture of bone. Previous culture with Staph epi. Urine with PSE. Sensitive to zosyn. Do not feel it will interfere with any surgery Plastics may plan. [facility]. Unclear timeframe of transfer. Will see the patient at [facility] in am12/20. OK with Vanco/Zosyn for now.    Dr Koo

## 2020-12-20 NOTE — PROGRESS NOTES
Pt arrived from [facility]. VSS. Alert and oriented.   Wounds covered with dressings, wound care following.   Oriented to room and hospital rules.  Ordered pt a low airloss bed.

## 2020-12-20 NOTE — PROGRESS NOTES
Pharmacy Kinetics Addendum:    70 y.o. male on vancomycin day # 2 12/19/2020    Currently on Vancomycin 1500 mg iv q24hr (1900)    Indication for Treatment: sacral decubitus wound infection/sacral OM    Recent Labs     12/18/20  1425 12/19/20  0423   BUN 11 13   CREATININE 0.52 0.66   ALBUMIN 3.2 3.0*       A/P   1. Vancomycin dose change: n/a  2. Next vancomycin level: 12/20 @ 1830  3. Goal trough: ~16 mcg/mL (12-16)  4. Comments: Transfer from [facility], received loading dose 12/18 @ 1900. No changes in renal function today, proceeding with previously ordered maintenance dose of 1500 mg q24h. Pharmacy will continue to follow.    Joaquin Mcgee, PharmD

## 2020-12-20 NOTE — PROGRESS NOTES
"Pharmacy Kinetics 70 y.o. male on vancomycin day # 3 (2020)    Currently on Vancomycin 1500 mg iv q24hr  Provider specified end date: TBD    Indication for Treatment:  Sacral decubitus wound infection/sacral osteomyelitis    Pertinent history per medical record: Admitted to [facility] on 2020 for worsening sacral decubitus wound with drainage and foul smell.  2020 CT of abd/pelvis - decubitus ulcer overlying the lower sacrum with multiple skin defects and probable associated sacral osteomyelitis. Patient transferred to [facility] on 20 for plastics consult.      PMH: Quadriplegia, Afib, Neurogenic Bladder     Other antibiotics: Zosyn 3.375 g IV q8 hours (extended infusion)     Allergies: Sulfa drugs      List concerns for renal function: Quadriplegic, CT with contrast     Pertinent cultures to date:   : Wound culture in process    MRSA nares swab if pneumonia is a concern: N/a    Recent Labs     20  1425 20  0423   WBC 7.8 8.4   NEUTSPOLYS 63.20 68.70     Recent Labs     20  1425 20  0423   BUN 11 13   CREATININE 0.52 0.66   ALBUMIN 3.2 3.0*     No results for input(s): VANCOTROUGH, VANCOPEAK, VANCORANDOM in the last 72 hours.    Intake/Output Summary (Last 24 hours) at 2020 1140  Last data filed at 2020 0815  Gross per 24 hour   Intake 0 ml   Output 1675 ml   Net -1675 ml      /61   Pulse (!) 46   Temp 36.4 °C (97.6 °F) (Temporal)   Resp 16   Ht 1.778 m (5' 10\")   Wt 89 kg (196 lb 3.4 oz)   SpO2 96%  Temp (24hrs), Av.8 °C (98.2 °F), Min:36.4 °C (97.6 °F), Max:37.2 °C (99 °F)      A/P   1. Vancomycin dose change: Not indicated at this time- continue with vancomycin 1500 mg q24H  2. Next vancomycin level:  @ 1830 (ordered)  3. Goal trough: 10-15 mcg/mL  4. Comments: Patient to undergo I and D today. Scr stable. No changes to current regimen. Will obtain vancomycin trough tonight prior to 3rd dose. Pharmacy will continue to monitor.    Chiara " LILIANA Walsh, PharmD

## 2020-12-20 NOTE — PROGRESS NOTES
2 RN skin check complete with ZAIRA Aleman.     Devices in place:  SpO2 finger probe, PIV line, ID band,  fall risk band, hutchins, colostomy.   Skin assessed under devices.     Confirmed wounds found on: sacrum (patient to OR today), anterior RLE, anterior LLE and lateral LLE, L heel dressings in place, CDI  New potential ulcers noted on: NA  Wound consult placed: wound care following     - All bony prominences assessed. Apart from the above noted wounds skin intact.     Preventative measures in place:  - turns q2 hours to support repositioning  -heels floated with moon boots  -Low airloss mattress in place.   -repositioning of devices   -Offered tooth brushing

## 2020-12-20 NOTE — CONSULTS
Infectious disease consult  Requesting Physician: Dennis Goldman MD  Reason for consult: Osteomyelitis  Consulting physician: Damian Koo MD    History of Presenting Illness  The patient is a 69yo male with PMH significant for C5-C7 complete paraplegia, decubitus ulcers requiring flaps and a neurogenic bladder with a chronic indwelling hutchins last changed on 12/16 who initially presented to [facility] for worsening decubitus wounds seen by his home health RN.    The patient has struggled with wounds since his original diagnosis in August of 2019. He has required flaps and long term IV abx for various diagnosis to include, I believe osteomyelitis.     On this occasion, he reports that the air mattress lost its air and he was on the hard part of his bed for an extended period of time. He feels that from this injury that a wound developed and has worsened with now drainage and malodorous smell.     On evaluation at [facility], a CT scan was done with contrast and showed a decubitus ulcer overlying the lower sacrum with multiple skin defects and probable sacral osteomyelitis.  Pt follows with Dr. Moon his plastic surgeon who did his flap in August of 2019. Dr Matt Cummins was contacted and coordinated an I&D at [facility] for 12/20. ID was consulted. The patient was not septic.    The patient denies any fevers, chills, night sweats, nausea, vomiting, diarrhea, chest pain, productive cough, issues with his urinary system, abdominal pain, or rash.     Past Medical History  A-fib   Atrial flutter   GERD   Hypertension  Neurogenic bladder  Quadriplegia, C5-C7 complete  Percutaneouos pinning lower extremity  Colostomy with subsequent colostomy takedown  Ulcer debridement with fllap closure (8/21/2019)  Hernia repair.      Family History  Heart disease. No Cancer, CVA or DM.     Social History  Quit smoking near 45 year ago. He reports current alcohol use. He reports that he does not use drugs. Has home health RN  assistance.     Review of Systems  Negative on a 14 point AMA/CMS criteria other than above in the HPI.      Allergies  Sulfa - Rash     Medications  Prior to Admission Medications   Prescriptions Last Dose Informant Patient Reported? Taking?   Cholecalciferol (VITAMIN D) 2000 UNIT Tab 12/18/2020 at 0800 MAR from Other Facility Yes Yes   Sig: Take 2,000 Units by mouth every day.   Non Formulary Request 12/18/2020 at 0800 MAR from Other Facility Yes Yes   Sig: Take 1 Tab by mouth every day. Mannose 900 mg Cranberry 500 mg   Probiotic Product (PROBIOTIC PO) 12/18/2020 at 0800 MAR from Other Facility Yes Yes   Sig: Take 1 Tab by mouth every day.   acetaminophen (TYLENOL) 500 MG Tab 12/18/2020 at 0800 MAR from Other Facility Yes Yes   Sig: Take 500-1,000 mg by mouth every four hours as needed. Takes 1000 mg twice daily then 500 mg every 4 hours as needed (3grams per 24 hours)   amLODIPine (NORVASC) 5 MG Tab 12/18/2020 at 0800 MAR from Other Facility Yes No   Sig: Take 10 mg by mouth every day.   ascorbic acid (ASCORBIC ACID) 500 MG Tab 12/18/2020 at 0800 MAR from Other Facility Yes Yes   Sig: Take 500 mg by mouth 2 (two) times a day.   baclofen (LIORESAL) 20 MG tablet 12/18/2020 at 0800 MAR from Other Facility Yes No   Sig: Take 20 mg by mouth 4 times a day.   dakins 0.125%, 1/4 strength, 0.125 % Solution NOT TAKING at NOT TAKING MAR from Other Facility No No   Sig: Apply 10 mL to affected area(s) 2 Times a Day.   Patient not taking: Reported on 12/18/2020   docusate sodium (COLACE) 100 MG Cap 12/18/2020 at 0800 MAR from Other Facility Yes No   Sig: Take 200 mg by mouth 2 times a day.   ferrous sulfate 325 (65 Fe) MG tablet 12/18/2020 at 0800 MAR from Other Facility Yes No   Sig: Take 325 mg by mouth 2 Times a Day.   fluticasone (FLONASE) 50 MCG/ACT nasal spray PRN at PRN MAR from Other Facility Yes Yes   Sig: Administer 2 Sprays into affected nostril(S) 1 time a day as needed.   losartan (COZAAR) 50 MG Tab 12/17/2020  at PM MAR from Other Facility Yes No   Sig: Take 50 mg by mouth every day.   magnesium oxide (MAG-OX) 400 MG Tab tablet 12/18/2020 at 0800 MAR from Other Facility Yes Yes   Sig: Take 400 mg by mouth every day.   metoprolol (LOPRESSOR) 25 MG Tab NOT TAKING at NOT TAKING MAR from Other Facility No No   Sig: Take 1 Tab by mouth 2 times a day.   Patient not taking: Reported on 12/18/2020   polyethylene glycol/lytes (MIRALAX) 17 g Pack 12/18/2020 at 0800 MAR from Other Facility Yes Yes   Sig: Take 17 g by mouth every day.   rivaroxaban (XARELTO) 10 MG Tab tablet 12/17/2020 at PM MAR from Other Facility Yes No   Sig: Take 10 mg by mouth with dinner.   sennosides (SENOKOT) 8.6 MG Tab 12/18/2020 at 0800 MAR from Other Facility Yes No   Sig: Take 17.2 mg by mouth 2 times a day.   therapeutic multivitamin-minerals (THERAGRAN-M) Tab 12/18/2020 at 0800 MAR from Other Facility Yes No   Sig: Take 1 Tab by mouth every day.   traZODone (DESYREL) 50 MG Tab PRN at PRN MAR from Other Facility Yes No   Sig: Take 50 mg by mouth at bedtime as needed.      Facility-Administered Medications: None      Physical Exam  Temp:  36.4  Pulse: 46  Resp: 16  BP: 112/61  SpO2:  96% on RA     Physical Exam  Vitals signs and nursing note reviewed.   HENT:      Head: Normocephalic.      Nose: No congestion.   Eyes:      General: No scleral icterus.        Left eye: No discharge.   Cardiovascular:      Rate and Rhythm: Normal rate.      Heart sounds: No gallop.    Pulmonary:      Effort: Pulmonary effort is normal. No respiratory distress.      Breath sounds: Normal breath sounds. No wheezing.   Abdominal:      General: There is no distension.      Tenderness: There is no abdominal tenderness. There is no guarding.   Skin:     Comments: Unable to assess buttock. Just had BM  Neurological:      General: Compatible with complete Quadraplegia     Picture from report in [facility]:    LINES, DRAINS, AND WOUNDS  This is an automated list. Peripheral IVs  will be removed prior to discharge.       Peripheral IV 12/18/20 20 G Right Wrist (Active)   Site Assessment Clean;Dry;Intact 12/19/20 0800   Dressing Type Transparent 12/19/20 0800   Line Status Infusing 12/19/20 0800   Dressing Status Clean;Dry;Intact 12/19/20 0800   Dressing Intervention Initial dressing 12/18/20 1425   Infiltration Grading ([facility]) 0 12/18/20 1425   Phlebitis Scale ([facility] Only) 0 12/18/20 1425      Urethral Catheter (Active)           Wound 12/18/20 Heel Posterior Left (Active)   Wound Image     12/18/20 2249   Site Assessment Red;Non-healing 12/18/20 2249   Periwound Assessment Pink;Red 12/18/20 2249   Margins Unattached edges 12/18/20 2249   Drainage Amount Small 12/18/20 2249   Drainage Description Brown;Sanguineous;Purulent 12/18/20 2249   Treatments Site care;Cleansed 12/18/20 2249   Wound Cleansing Normal Saline Irrigation 12/18/20 2249   Dressing Options Hydrofiber Silver;Nonadhesive Foam 12/18/20 2249   Dressing Changed New 12/18/20 2249   Dressing Status Clean;Dry;Intact 12/19/20 0806   Wound Length (cm) 1.7 cm 12/18/20 2249   Wound Width (cm) 1.2 cm 12/18/20 2249   Wound Depth (cm) 0.4 cm 12/18/20 2249   Wound Surface Area (cm^2) 2.04 cm^2 12/18/20 2249   Wound Volume (cm^3) 0.82 cm^3 12/18/20 2249       Wound 12/18/20 Buttocks;Coccyx Posterior;Lower (Active)   Wound Image    12/18/20 2303   Site Assessment Pink;Bleeding;Drainage;Non-healing 12/18/20 2303   Periwound Assessment Red 12/18/20 2303   Margins Unattached edges 12/18/20 2303   Drainage Amount Moderate 12/18/20 2303   Drainage Description Sanguineous;Purulent 12/18/20 2303   Treatments Cleansed;Site care 12/18/20 2303   Wound Cleansing Normal Saline Irrigation 12/18/20 2303   Dressing Options Hydrofiber Silver;Nonadhesive Foam 12/18/20 2303   Dressing Changed New 12/18/20 2303   Dressing Status Clean;Dry;Intact 12/18/20 2303   Wound Length (cm) 5 cm 12/18/20 2303   Wound Width (cm) 2 cm 12/18/20 2303   Wound Depth (cm)  0.7 cm 12/18/20 2303   Wound Surface Area (cm^2) 10 cm^2 12/18/20 2303   Wound Volume (cm^3) 7 cm^3 12/18/20 2303       Wound 12/18/20 Leg Anterior;Lower Right (Active)   Wound Image   12/18/20 2235   Site Assessment Pink;Red 12/18/20 2235   Periwound Assessment Red 12/18/20 2235   Margins Unattached edges 12/18/20 2235   Drainage Amount Scant 12/18/20 2235   Drainage Description Sanguineous 12/18/20 2235   Treatments Ice applied;Site care 12/18/20 2235   Dressing Options Nonadhesive Foam 12/18/20 2235   Dressing Changed New 12/18/20 2235   Dressing Status Clean;Dry;Intact 12/19/20 0806            Peripheral IV 12/18/20 20 G Right Wrist (Active)   Site Assessment Clean;Dry;Intact 12/19/20 0800   Dressing Type Transparent 12/19/20 0800   Line Status Infusing 12/19/20 0800   Dressing Status Clean;Dry;Intact 12/19/20 0800   Dressing Intervention Initial dressing 12/18/20 1425   Infiltration Grading ([facility], [facility]) 0 12/18/20 1425   Phlebitis Scale ([facility] Only) 0 12/18/20 1425           Urethral Catheter (Active)     LABS  Recent Labs     12/18/20 1425 12/19/20 0423   WBC 7.8 8.4   RBC 3.65* 3.31*   HEMOGLOBIN 11.7* 10.8*   HEMATOCRIT 35.2* 33.0*   MCV 96.4 99.7*   MCH 32.1 32.6   RDW 48.8 50.4*   PLATELETCT 259 234   MPV 8.5* 8.5*   NEUTSPOLYS 63.20 68.70   LYMPHOCYTES 20.70* 16.20*   MONOCYTES 12.60 11.80   EOSINOPHILS 2.60 2.30   BASOPHILS 0.30 0.40     Recent Labs     12/18/20  1425 12/19/20  0423   SODIUM 135 138   POTASSIUM 4.4 4.4   CHLORIDE 101 104   CO2 22 24   GLUCOSE 93 101*   BUN 11 13     Recent Labs     12/18/20 1425 12/19/20  0423   ALBUMIN 3.2 3.0*   TBILIRUBIN 0.3 0.3   ALKPHOSPHAT 74 66   TOTPROTEIN 7.1 6.5   ALTSGPT 5 5   ASTSGOT 8* 8*   CREATININE 0.52 0.66       IMAGING  Ct-pelvis With    Result Date: 12/18/2020 12/18/2020 4:57 PM HISTORY/REASON FOR EXAM:  Open wound, anus; decubitus ulcer possible tracting. TECHNIQUE/EXAM DESCRIPTION AND NUMBER OF VIEWS: CT scan of the pelvis with contrast.  Contrast-enhanced helical scanning of the pelvis was obtained from the iliac crests through the pubic symphysis following the bolus administration of 100 mL of Omnipaque 350 nonionic contrast without complication. Low dose optimization technique was utilized for this CT exam including automated exposure control and adjustment of the mA and/or kV according to patient size. COMPARISON:  12/19/2019 FINDINGS: LEFT lower quadrant stoma present. Subcutis edema and ulceration overlies the lower sacrum.  Curvilinear structure present within the soft tissues overlying the sacrum, extending to LEFT gluteal region consistent with catheter fragment. Apparent bony resorption of the dorsal aspect of the lower sacrum. Mild presacral stranding. Cazares catheter present within the bladder. Pubic symphysis diastases again seen. Lumbar spine degenerative changes. Postoperative change of LEFT proximal femur. Evidence of old pelvic trauma.     Findings consistent with decubitus ulcer overlying the lower sacrum with multiple skin defects and probable associated sacral osteomyelitis.  Catheter fragment appears to be present in the soft tissues overlying the sacrum and extending into the LEFT gluteal region.      MICRO  Results     ** No results found for the last 168 hours. **          ASSESSMENT  Active Hospital Problems    Diagnosis   • *Decubitus ulcer [L89.90]   • Paroxysmal atrial flutter (HCC) [I48.92]   • Essential hypertension [I10]   • Neurogenic bladder [N31.9]     Decubitus ulcer and sacral osteomyelitis  Ulcer debridement with fllap closure (8/21/2019)  A-fib   Atrial flutter   GERD   Hypertension  Neurogenic bladder - Cazares dependent  Quadriplegia, C5-C7 complete  Percutaneouos pinning lower extremity  Colostomy with subsequent colostomy takedown    PLAN:  Patient is going for surgery today. This will be I&D, I suppose.  Would desire bone cultures if available to capture. If not deep wound cultures would be appropriate.  Ok to  continue zosyn at the moment and then will direct abx to what is cultures in surgery.  Wound care imperative to patients success  CM to assist with getting a proper working bed at home as well as continue home health support  Anticipate the patient requiring 6 weeks of IV Abx.   A PICC line would be prudent for this. Can be done tomorrow.  Monitor for fevers  Will additionally review [facility] notes from previous encounters.    Thank you for this consult. We will continue to follow along with you.    Greater than 60 min. Over 50% of that time was in direct face to face care and counseling.    Dr Koo

## 2020-12-21 ENCOUNTER — APPOINTMENT (OUTPATIENT)
Dept: RADIOLOGY | Facility: MEDICAL CENTER | Age: 70
DRG: 579 | End: 2020-12-21
Attending: INTERNAL MEDICINE
Payer: MEDICARE

## 2020-12-21 ENCOUNTER — APPOINTMENT (OUTPATIENT)
Dept: RADIOLOGY | Facility: MEDICAL CENTER | Age: 70
DRG: 579 | End: 2020-12-21
Attending: STUDENT IN AN ORGANIZED HEALTH CARE EDUCATION/TRAINING PROGRAM
Payer: MEDICARE

## 2020-12-21 LAB
ANION GAP SERPL CALC-SCNC: 3 MMOL/L (ref 7–16)
BACTERIA WND AEROBE CULT: ABNORMAL
BACTERIA WND AEROBE CULT: ABNORMAL
BASOPHILS # BLD AUTO: 0.4 % (ref 0–1.8)
BASOPHILS # BLD: 0.03 K/UL (ref 0–0.12)
BUN SERPL-MCNC: 11 MG/DL (ref 8–22)
CALCIUM SERPL-MCNC: 8.7 MG/DL (ref 8.5–10.5)
CHLORIDE SERPL-SCNC: 111 MMOL/L (ref 96–112)
CO2 SERPL-SCNC: 26 MMOL/L (ref 20–33)
CREAT SERPL-MCNC: 0.64 MG/DL (ref 0.5–1.4)
EOSINOPHIL # BLD AUTO: 0.23 K/UL (ref 0–0.51)
EOSINOPHIL NFR BLD: 3.1 % (ref 0–6.9)
ERYTHROCYTE [DISTWIDTH] IN BLOOD BY AUTOMATED COUNT: 53.3 FL (ref 35.9–50)
FLUAV RNA SPEC QL NAA+PROBE: NEGATIVE
FLUBV RNA SPEC QL NAA+PROBE: NEGATIVE
GLUCOSE SERPL-MCNC: 96 MG/DL (ref 65–99)
GRAM STN SPEC: ABNORMAL
GRAM STN SPEC: NORMAL
GRAM STN SPEC: NORMAL
HCT VFR BLD AUTO: 32.4 % (ref 42–52)
HGB BLD-MCNC: 10 G/DL (ref 14–18)
IMM GRANULOCYTES # BLD AUTO: 0.06 K/UL (ref 0–0.11)
IMM GRANULOCYTES NFR BLD AUTO: 0.8 % (ref 0–0.9)
LYMPHOCYTES # BLD AUTO: 1.36 K/UL (ref 1–4.8)
LYMPHOCYTES NFR BLD: 18.2 % (ref 22–41)
MCH RBC QN AUTO: 31.9 PG (ref 27–33)
MCHC RBC AUTO-ENTMCNC: 30.9 G/DL (ref 33.7–35.3)
MCV RBC AUTO: 103.5 FL (ref 81.4–97.8)
MONOCYTES # BLD AUTO: 0.68 K/UL (ref 0–0.85)
MONOCYTES NFR BLD AUTO: 9.1 % (ref 0–13.4)
NEUTROPHILS # BLD AUTO: 5.13 K/UL (ref 1.82–7.42)
NEUTROPHILS NFR BLD: 68.4 % (ref 44–72)
NRBC # BLD AUTO: 0 K/UL
NRBC BLD-RTO: 0 /100 WBC
PLATELET # BLD AUTO: 218 K/UL (ref 164–446)
PMV BLD AUTO: 8.5 FL (ref 9–12.9)
POTASSIUM SERPL-SCNC: 3.7 MMOL/L (ref 3.6–5.5)
RBC # BLD AUTO: 3.13 M/UL (ref 4.7–6.1)
RSV RNA SPEC QL NAA+PROBE: NEGATIVE
SARS-COV-2 RNA RESP QL NAA+PROBE: NOTDETECTED
SIGNIFICANT IND 70042: ABNORMAL
SIGNIFICANT IND 70042: NORMAL
SIGNIFICANT IND 70042: NORMAL
SITE SITE: ABNORMAL
SITE SITE: NORMAL
SITE SITE: NORMAL
SODIUM SERPL-SCNC: 140 MMOL/L (ref 135–145)
SOURCE SOURCE: ABNORMAL
SOURCE SOURCE: NORMAL
SOURCE SOURCE: NORMAL
SPECIMEN SOURCE: NORMAL
WBC # BLD AUTO: 7.5 K/UL (ref 4.8–10.8)

## 2020-12-21 PROCEDURE — 700105 HCHG RX REV CODE 258: Performed by: INTERNAL MEDICINE

## 2020-12-21 PROCEDURE — 85025 COMPLETE CBC W/AUTO DIFF WBC: CPT

## 2020-12-21 PROCEDURE — 700102 HCHG RX REV CODE 250 W/ 637 OVERRIDE(OP): Performed by: INTERNAL MEDICINE

## 2020-12-21 PROCEDURE — 80048 BASIC METABOLIC PNL TOTAL CA: CPT

## 2020-12-21 PROCEDURE — 700111 HCHG RX REV CODE 636 W/ 250 OVERRIDE (IP): Performed by: INTERNAL MEDICINE

## 2020-12-21 PROCEDURE — A9270 NON-COVERED ITEM OR SERVICE: HCPCS | Performed by: INTERNAL MEDICINE

## 2020-12-21 PROCEDURE — 99232 SBSQ HOSP IP/OBS MODERATE 35: CPT | Performed by: STUDENT IN AN ORGANIZED HEALTH CARE EDUCATION/TRAINING PROGRAM

## 2020-12-21 PROCEDURE — 02HV33Z INSERTION OF INFUSION DEVICE INTO SUPERIOR VENA CAVA, PERCUTANEOUS APPROACH: ICD-10-PCS | Performed by: INTERNAL MEDICINE

## 2020-12-21 PROCEDURE — 770001 HCHG ROOM/CARE - MED/SURG/GYN PRIV*

## 2020-12-21 PROCEDURE — 36415 COLL VENOUS BLD VENIPUNCTURE: CPT

## 2020-12-21 PROCEDURE — 36573 INSJ PICC RS&I 5 YR+: CPT

## 2020-12-21 PROCEDURE — A9270 NON-COVERED ITEM OR SERVICE: HCPCS | Performed by: STUDENT IN AN ORGANIZED HEALTH CARE EDUCATION/TRAINING PROGRAM

## 2020-12-21 PROCEDURE — 700102 HCHG RX REV CODE 250 W/ 637 OVERRIDE(OP): Performed by: STUDENT IN AN ORGANIZED HEALTH CARE EDUCATION/TRAINING PROGRAM

## 2020-12-21 PROCEDURE — 97606 NEG PRS WND THER DME>50 SQCM: CPT

## 2020-12-21 RX ADMIN — PIPERACILLIN AND TAZOBACTAM 4.5 G: 4; .5 INJECTION, POWDER, LYOPHILIZED, FOR SOLUTION INTRAVENOUS; PARENTERAL at 14:42

## 2020-12-21 RX ADMIN — BACLOFEN 20 MG: 10 TABLET ORAL at 14:42

## 2020-12-21 RX ADMIN — PIPERACILLIN SODIUM, TAZOBACTAM SODIUM 3.38 G: 3; .375 INJECTION, POWDER, LYOPHILIZED, FOR SOLUTION INTRAVENOUS at 06:37

## 2020-12-21 RX ADMIN — BACLOFEN 20 MG: 10 TABLET ORAL at 10:03

## 2020-12-21 RX ADMIN — BACLOFEN 20 MG: 10 TABLET ORAL at 21:33

## 2020-12-21 RX ADMIN — FERROUS SULFATE TAB 325 MG (65 MG ELEMENTAL FE) 325 MG: 325 (65 FE) TAB at 17:37

## 2020-12-21 RX ADMIN — FERROUS SULFATE TAB 325 MG (65 MG ELEMENTAL FE) 325 MG: 325 (65 FE) TAB at 06:37

## 2020-12-21 RX ADMIN — AMLODIPINE BESYLATE 10 MG: 10 TABLET ORAL at 06:37

## 2020-12-21 RX ADMIN — PIPERACILLIN AND TAZOBACTAM 4.5 G: 4; .5 INJECTION, POWDER, LYOPHILIZED, FOR SOLUTION INTRAVENOUS; PARENTERAL at 21:34

## 2020-12-21 RX ADMIN — RIVAROXABAN 20 MG: 20 TABLET, FILM COATED ORAL at 17:37

## 2020-12-21 RX ADMIN — DOCUSATE SODIUM 50 MG AND SENNOSIDES 8.6 MG 2 TABLET: 8.6; 5 TABLET, FILM COATED ORAL at 06:37

## 2020-12-21 RX ADMIN — DOCUSATE SODIUM 50 MG AND SENNOSIDES 8.6 MG 2 TABLET: 8.6; 5 TABLET, FILM COATED ORAL at 17:37

## 2020-12-21 RX ADMIN — DOCUSATE SODIUM 100 MG: 100 CAPSULE ORAL at 06:37

## 2020-12-21 RX ADMIN — LOSARTAN POTASSIUM 50 MG: 50 TABLET, FILM COATED ORAL at 21:33

## 2020-12-21 RX ADMIN — DOCUSATE SODIUM 100 MG: 100 CAPSULE ORAL at 17:37

## 2020-12-21 RX ADMIN — BACLOFEN 20 MG: 10 TABLET ORAL at 17:37

## 2020-12-21 ASSESSMENT — ENCOUNTER SYMPTOMS
HEARTBURN: 0
FOCAL WEAKNESS: 0
NECK PAIN: 0
ABDOMINAL PAIN: 0
DOUBLE VISION: 0
MYALGIAS: 0
CHILLS: 0
PND: 0
VOMITING: 0
SENSORY CHANGE: 1
SPUTUM PRODUCTION: 0
DIZZINESS: 0
SORE THROAT: 0
NAUSEA: 0
HEADACHES: 0
FEVER: 0
COUGH: 0
PALPITATIONS: 0
ORTHOPNEA: 0
SHORTNESS OF BREATH: 0
BLURRED VISION: 0
DEPRESSION: 0

## 2020-12-21 NOTE — PROCEDURES
Vascular Access Team     Date of Insertion: December 21, 2020  Arm Circumference: 32 cm  Internal length: 42 cm  External Length: 0 cm  Vein Occupancy %: 20%   Reason for PICC: Long Term Antibiotics  Labs: WBC 7.5, , BUN 11, Cr 0.64, GFR >60, INR N/A     Consents confirmed, vessel patency confirmed with ultrasound. Risks and benefits of procedure explained to patient and education regarding central line associated bloodstream infections provided. Questions answered.      PICC placed in RUE per licensed provider order with ultrasound guidance.  4 Fr, single lumen PICC placed in Basilic vein after 1 attempt(s). 2 mL of 1% lidocaine injected intradermally, 21 gauge microintroducer needle and modified Seldinger technique used. 42 cm catheter inserted with good blood return. Secured at 0 cm marker. Each lumen flushed without resistance with 10 mL 0.9% normal saline. PICC line secured with Biopatch and Tegaderm.     PICC tip placement location is confirmed by nurse to be in the Superior Vena Cava (SVC) utilizing 3CG technology. PICC line is appropriate for use at this time. Patient tolerated procedure well, without complications.  Patient condition relayed to unit RN or ordering physician via this post procedure note in the EMR.      Ultrasound images uploaded to PACS and viewable in the EMR - yes  Ultrasound imaged printed and placed in paper chart - no     BARD Power PICC ref # [id number], Lot # [id number], Expiration Date May 31, 2021

## 2020-12-21 NOTE — PROGRESS NOTES
Pt is A&O 4, paraplegic, no feeling/motion in BLE  Pain denies   Denies nausea  Tolerating a regular diet   Wound to sacrum, dressing CDI, order to change Q shift, plan to change later this AM  Wounds to BLE, dressings CDI, order to change Q48  + Voids per hutchins catheter  + flatus per ostomy  + BM per ostomy  Up max assist  Bed alarm n/a, pt low fall risk per blade cortés  On low air loss mattress, pt has 4 bed rails up for safety  Reviewed plan of care with patient, bed in lowest position and locked, pt resting comfortably now, call light within reach, all needs met at this time. Interventions will be executed per plan of care

## 2020-12-21 NOTE — PROGRESS NOTES
Hospital Medicine Daily Progress Note    Date of Service  12/21/2020    Chief Complaint  70 y.o. male admitted 12/19/2020 with Infected decubitus ulcer and possible sacrum osteomyelitis     Hospital Course  70 y.o. male with C5-C7 complete paraplegia, decubitus ulcers s/p plastic flap surgery by Dr. Moon, neurogenic bladder with indwelling Cazares catheter which was changed on Wednesday (12/16) who presented 12/18/2020 with worsening of decubitus wound.    Patient was referred to ED at [facility] by his home health wound nurse with worsening sacral decubitus wound. The wounds has been becoming deeper and larger with copious serosanguineous discharge and foul smell. Relation with CT of the abdomen pelvis with contrast showed decubitus ulcer overlying the lower sacrum with multiple skin defects and probable sacral osteomyelitis.  Pt follows with Dr. Moon his plastic surgeon who did his flap in August of 2019. At [facility], Pt was seen by wound care and recommending for plastic evaluation. Pt was evaluated by Dr. Cummins plastic surgery and then transferred to [facility] for OR. ID was consulted on admission for sacrum osteomyelitis.    12/20 at [facility]: no acute complain in AM; he underwent irrigation and debridement of sacral wound ulcer.     Interval Problem Update  Vitals reviewed; afebrile.  The rest of the vitals within normal parameters except HR in high 40s-50s.   Pain scale rated about 0 in last 24hrs.    At bedside, no acute complain from patient. Current treatment plan explained and he wonders whether he would need a long Abx course if OR culture results came back negative.     Labs reviewed  OR anaerobic culture in progress    No leukocytosis  Hb 10.8 > 10  .5  Cr 0.64    ESR 71, Cr 4.53    Consultants/Specialty  ID - Dr. Koo  Plastic Surgery - Dr. Cummisn     Code Status  Full Code    Disposition  Likely back to group home when medically stable    Discussed with patient, patient's nurse and with multidisciplinary  team during rounds including , pharmacist and charge nurse.      I have performed the physical examination, and reviewed updated ROS and plan today 12/21/2020.  In review of yesterday's note, there are no new changes except as documented above or bolded below.      Review of Systems  Review of Systems   Constitutional: Negative for chills, fever and malaise/fatigue.   HENT: Negative for congestion and sore throat.    Eyes: Negative for blurred vision and double vision.   Respiratory: Negative for cough, sputum production and shortness of breath.    Cardiovascular: Negative for chest pain, palpitations, orthopnea, leg swelling and PND.   Gastrointestinal: Negative for abdominal pain, heartburn, nausea and vomiting.   Genitourinary:        Retention   Musculoskeletal: Negative for myalgias and neck pain.   Neurological: Positive for sensory change (chronic). Negative for dizziness, focal weakness and headaches.   Psychiatric/Behavioral: Negative for depression.        Physical Exam  Temp:  [36.2 °C (97.1 °F)-37.3 °C (99.2 °F)] 37.3 °C (99.1 °F)  Pulse:  [51-87] 59  Resp:  [10-27] 15  BP: ()/(49-68) 132/65  SpO2:  [91 %-100 %] 95 %    Physical Exam  Vitals signs and nursing note reviewed.   Constitutional:       General: He is not in acute distress.     Appearance: Normal appearance. He is not ill-appearing.   HENT:      Head: Normocephalic and atraumatic.      Mouth/Throat:      Mouth: Mucous membranes are moist.      Pharynx: Oropharynx is clear.   Eyes:      General: No scleral icterus.     Conjunctiva/sclera: Conjunctivae normal.   Cardiovascular:      Rate and Rhythm: Normal rate and regular rhythm.      Pulses: Normal pulses.      Heart sounds: Normal heart sounds.   Pulmonary:      Effort: Pulmonary effort is normal. No respiratory distress.      Breath sounds: Normal breath sounds. No wheezing.   Abdominal:      General: Bowel sounds are normal. There is no distension.      Palpations: Abdomen  is soft.      Tenderness: There is no abdominal tenderness.   Musculoskeletal:         General: No swelling or tenderness.   Skin:     General: Skin is warm and dry.      Capillary Refill: Capillary refill takes less than 2 seconds.      Comments: Sacral decubitus ulcer (wound images reviewed in epic)   Neurological:      General: No focal deficit present.      Mental Status: He is alert and oriented to person, place, and time. Mental status is at baseline.   Psychiatric:         Mood and Affect: Mood normal.         Fluids    Intake/Output Summary (Last 24 hours) at 12/21/2020 1018  Last data filed at 12/21/2020 0454  Gross per 24 hour   Intake 700 ml   Output 450 ml   Net 250 ml       Laboratory  Recent Labs     12/18/20  1425 12/19/20  0423 12/21/20  0605   WBC 7.8 8.4 7.5   RBC 3.65* 3.31* 3.13*   HEMOGLOBIN 11.7* 10.8* 10.0*   HEMATOCRIT 35.2* 33.0* 32.4*   MCV 96.4 99.7* 103.5*   MCH 32.1 32.6 31.9   MCHC 33.2* 32.7* 30.9*   RDW 48.8 50.4* 53.3*   PLATELETCT 259 234 218   MPV 8.5* 8.5* 8.5*     Recent Labs     12/18/20  1425 12/19/20  0423 12/21/20  0605   SODIUM 135 138 140   POTASSIUM 4.4 4.4 3.7   CHLORIDE 101 104 111   CO2 22 24 26   GLUCOSE 93 101* 96   BUN 11 13 11   CREATININE 0.52 0.66 0.64   CALCIUM 9.0 8.7 8.7                   Imaging  CT abd/pelvis:   Findings consistent with decubitus ulcer overlying the lower sacrum with multiple skin defects and probable associated sacral osteomyelitis.  Catheter fragment appears to be present in the soft tissues overlying the sacrum and extending into the LEFT   gluteal region.          Assessment/Plan  * Decubitus ulcer- (present on admission)  Assessment & Plan  Concern for sacral osteomyelitis according to CT of the pelvis report.  MRI is not possible due to metal rods in his lower extremities  Patient has been started on empiric antibiotics  Infection disease appreciated - continue zosyn and vancomycin at the moment and then will direct abx to what is  cultures in surgery.  Wound care also consulted  Plastic surgery Dr. Cummins has been consulted - s/p irrigation and debridement of sacral wound ulcer on 12/20      CM to assist with getting a proper working bed at home as well as continue home health support  Anticipate the patient requiring 6 weeks of IV Abx; will place PICC line (order placed)      Neurogenic bladder- (present on admission)  Assessment & Plan  Hutchins catheter was last changed on Wednesday.  Chronic indwelling hutchins catheter due to neurogenic bladder with intermittent cath changes  Pt wonders if he can get a suprapubic catheter placement and [place]  Urology consult placed (12/21)      Essential hypertension- (present on admission)  Assessment & Plan  Continue home medications  Monitor blood pressure    Paroxysmal atrial flutter (HCC)- (present on admission)  Assessment & Plan  Heart rate is under control  Xarelto for anti-coag; restarted 12/21.   Continue metoprolol       VTE prophylaxis: Xarelto restarted 12/21    Please note that this dictation was created using voice recognition software. There may be errors I did not discover before finalizing the note.

## 2020-12-21 NOTE — ANESTHESIA TIME REPORT
Anesthesia Start and Stop Event Times     Date Time Event    12/20/2020 1530 Ready for Procedure     1534 Anesthesia Start     1646 Anesthesia Stop        Responsible Staff  12/20/20    Name Role Begin End    Jarred Clements M.D. Anesth 1534 1646        Preop Diagnosis (Free Text):  Pre-op Diagnosis     SACRAL PRESSURE ULCER        Preop Diagnosis (Codes):    Post op Diagnosis  Sacral pressure ulcer      Premium Reason  E. Weekend    Comments: procedure: debridement of sacral pressure ulcer; prone position, ASA 3 (a-fib)

## 2020-12-21 NOTE — DISCHARGE PLANNING
.Care Transition Team Discharge Planning    Anticipates discharge disposition:  • TBD    Action:  • This RN CM is following the case.   • Chart Review Done.  This RN CM received a call from Doc of [facility]  at tel [phone] that Pt is on service with [facility]  prior to this hospitalization.    Barriers to Discharge:  • Pending Medical Clearance  • Pending re-acceptance with [facility]     Plan:  • Will need to send a new referral and face to face to [facility]  for resumption of service before Pt discharges   • Will continue to assist Pt with discharge as needed.

## 2020-12-21 NOTE — ANESTHESIA POSTPROCEDURE EVALUATION
Patient: Osvaldo Pate    Procedure Summary     Date: 12/20/20 Room / Location: [facility] 09 / SURGERY [facility]    Anesthesia Start: 1534 Anesthesia Stop: 1646    Procedure: IRRIGATION AND DEBRIDEMENT, WOUND SACRAL ULCER (Back) Diagnosis: (SACRAL PRESSURE ULCER)    Surgeons: Matt Cummins M.D. Responsible Provider: Jarred Clements M.D.    Anesthesia Type: general ASA Status: 3          Final Anesthesia Type: general  Last vitals  BP   Blood Pressure : 130/63    Temp   37.2 °C (98.9 °F)    Pulse   Pulse: (!) 53   Resp   18    SpO2   98 %      Anesthesia Post Evaluation    Patient location during evaluation: PACU  Patient participation: complete - patient participated  Level of consciousness: awake and alert  Pain score: 0    Airway patency: patent  Anesthetic complications: no  Cardiovascular status: hemodynamically stable  Respiratory status: acceptable  Hydration status: euvolemic    PONV: none           Nurse Pain Score: 0 (NPRS)

## 2020-12-21 NOTE — OR SURGEON
Immediate Post OP Note    PreOp Diagnosis: Sacral pressure ulcer    PostOp Diagnosis: Same    Procedure(s):  IRRIGATION AND DEBRIDEMENT, WOUND SACRAL ULCER - Wound Class: Dirty or Infected    Surgeon(s):  Matt Cummins M.D.    Anesthesiologist/Type of Anesthesia:  Anesthesiologist: Jarred Clements M.D./General    Surgical Staff:  Circulator: Ruby John  Operating Room Assistant (ORA): Fortino Cornejo  Scrub Person: Yumiko Chapa    Specimens removed if any:  ID Type Source Tests Collected by Time Destination   1 : Sacral Deep Wound Tissue Tissue Back AEROBIC/ANAEROBIC CULTURE (SURGERY) Matt Cummins M.D. 12/20/2020  4:05 PM    2 : Sacral Bone Tissue Back AEROBIC/ANAEROBIC CULTURE (SURGERY) Matt Cummins M.D. 12/20/2020  4:05 PM        Estimated Blood Loss: 50cc    Findings:     Complications: none        12/20/2020 4:38 PM Matt Cummins M.D.

## 2020-12-21 NOTE — PROGRESS NOTES
A&O x 4. Patient calls appropriately.  Q2 turns in place. Low airloss mattress. Float boots in place  Patient has 0/10 pain.   Denies N&V. Tolerating regular diet.  Sacrum wound with dressing in place, wound vac to be placed today. Wounds to BLE with dressing CDI, Q 48hr dressing changes  + void via hutchins, + flatus, + output via ostomy.  Patient denies SOB.  Review plan with of care with patient. Call light and personal belongings with in reach. Hourly rounding in place. All needs met at this time.

## 2020-12-21 NOTE — DISCHARGE PLANNING
Chart review indicate patient could benefit from physiatry conuslt to contribute to plan of care.

## 2020-12-21 NOTE — WOUND TEAM
[facility]  Inpatient Services  Initial Wound and Skin Care Evaluation    Admission Date: 12/19/2020     Last order of IP CONSULT TO WOUND CARE was found on 12/20/2020 from Hospital Encounter on 12/19/2020       HPI, PMH, SH: Reviewed    Unit where seen by Wound Team: [id number]     WOUND CONSULT/FOLLOW UP RELATED TO:  Sacrum NPWT placement    Self Report / Pain Level:  0/10, non-sensate to the sacrum       OBJECTIVE:  Pt lying in JERARDO bed with kerlix packing dressing in place. Preventable measures in place ICU JERARDO bed, with prevalon boots to bilateral heels.    WOUND TYPE, LOCATION, CHARACTERISTICS (Pressure Injuries: location, stage, POA or date identified)     Negative Pressure Wound Therapy 12/21/20 Surgical Sacrum Left (Active)   NPWT Pump Mode / Pressure Setting Ulta;Continuous;125 mmHg 12/21/20 1400   Dressing Type Medium;Black Foam (Veraflo) 12/21/20 1400   Number of Foam Pieces Used 3 12/21/20 1400   Canister Changed Yes 12/21/20 1400   NEXT Dressing Change/Treatment Date 12/24/20 12/21/20 1400   WOUND NURSE ONLY - Time Spent with Patient (mins) 60 12/21/20 1400     Wound 12/20/20 Incision Sacrum (Active)   Wound Image    12/21/20 1400   Site Assessment Red;Yellow;White 12/21/20 1400   Periwound Assessment Clean;Dry;Intact 12/21/20 1400   Margins Defined edges 12/21/20 1400   Closure Secondary intention 12/21/20 1400   Drainage Amount Small 12/21/20 1400   Drainage Description Serosanguineous 12/21/20 1400   Treatments Cleansed;Irrigation;Site care 12/21/20 1400   Wound Cleansing Approved Wound Cleanser 12/21/20 1400   Periwound Protectant Skin Protectant Wipes to Periwound;Drape 12/21/20 1400   Dressing Cleansing/Solutions Not Applicable 12/21/20 1400   Dressing Options Wound Vac 12/21/20 1400   Dressing Changed New 12/21/20 1400   Dressing Status Clean;Dry;Intact 12/21/20 1400   Dressing Change/Treatment Frequency By Wound Team Only 12/21/20 1400   NEXT Dressing Change/Treatment Date 12/24/20  12/21/20 1400   NEXT Weekly Photo (Inpatient Only) 12/28/20 12/21/20 1400   Wound Length (cm) 3.5 cm 12/21/20 1400   Wound Width (cm) 5.5 cm 12/21/20 1400   Wound Depth (cm) 3.5 cm 12/21/20 1400   Wound Surface Area (cm^2) 19.25 cm^2 12/21/20 1400   Wound Volume (cm^3) 67.38 cm^3 12/21/20 1400   Undermining of Wound, 1st Location From 10 o'clock;To 5 o'clock 12/21/20 1400   Undermining (cm) - 2nd location 9 cm 12/21/20 1400   Shape Cavernous  12/21/20 1400   Wound Odor None 12/21/20 1400   Exposed Structures Bone 12/21/20 1400               Vascular:    JUAN MANUEL:   No results found.      Lab Values:    Lab Results   Component Value Date/Time    WBC 7.5 12/21/2020 06:05 AM    RBC 3.13 (L) 12/21/2020 06:05 AM    HEMOGLOBIN 10.0 (L) 12/21/2020 06:05 AM    HEMATOCRIT 32.4 (L) 12/21/2020 06:05 AM    CREACTPROT 4.53 (H) 12/18/2020 02:25 PM    SEDRATEWES 71 (H) 12/18/2020 02:25 PM          Culture:    Culture Results show:  Recent Results (from the past 720 hour(s))   CULTURE WOUND W/ GRAM STAIN    Collection Time: 12/18/20 11:28 PM    Specimen: Buttock; Wound   Result Value Ref Range    Significant Indicator POS (POS)     Source WND     Site BUTTOCK     Culture Result - (A)     Gram Stain Result Rare WBCs.  No organisms seen.       Culture Result (A)      Proteus mirabilis ESBL  Light growth  Extended Spectrum Beta-lactamase (ESBL) isolated.  ESBL's may be clinically resistant to therapy with  Penicillins,Cephalosporins or Aztreonam despite  apparent in vitro susceptibility to some of these agents.  The patient requires contact isolation.  Please contact pharmacy or an Infectious Disease Specialist  if you have any questions about appropriate therapy.         Susceptibility    Proteus mirabilis esbl - ROSE     Ampicillin >16 Resistant mcg/mL     Ceftriaxone >32 Resistant mcg/mL     Ceftazidime <=1 Resistant mcg/mL     Cefotaxime >32 Extended Spectrum Beta Lactamase mcg/mL     Cefazolin >16 Resistant mcg/mL     Ciprofloxacin >2  Resistant mcg/mL     Ampicillin/sulbactam <=8/4 Sensitive mcg/mL     Cefepime 8 Resistant mcg/mL     Tobramycin <=4 Sensitive mcg/mL     Cefotetan <=16 Sensitive mcg/mL     Ertapenem <=0.5 Sensitive mcg/mL     Gentamicin <=4 Sensitive mcg/mL     Pip/Tazobactam <=16 Sensitive mcg/mL     Trimeth/Sulfa >2/38 Resistant mcg/mL         INTERVENTIONS BY WOUND TEAM:  Chart reviewed.  This RN to see patient.  Patient turned tot he right side as the wound presents more on his left side of sacrum/buttock area.  Patient's packing removed and wound bed cleansed with wound cleanser and gauze.  Raeann-wound protected with no sting skin prep and drape.  1 black foam snaked into wound bed to fill cavity, 1 black foam piece to bridge to superior wound to sacrum that is a lip to the sacrum wound and to the left hip.  And then 1 black foam for TRAC pad emily.  NPWT started at 125mmHg, no leaks noted.  Mepilex placed to left hip and tubing secured to mepilex.    Interdisciplinary consultation: Patient, Bedside RN     EVALUATION: Patient has C5-C7 complete paraplegia since 08/2019.  Patient had developed decubitus ulcers that was flapped in 08/2019 by Dr. Moon.      Goals: Steady decrease in wound area and depth weekly.    NURSING PLAN OF CARE ORDERS (X):    Dressing changes: See Dressing Care orders: x  Skin care: See Skin Care orders:   Rectal tube care: See Rectal Tube Care orders:   Other orders:    RSKIN:   CURRENTLY IN PLACE (X), APPLIED THIS VISIT (A), ORDERED (O):   Q shift Luis Enrique: x   Q shift pressure point assessments:  x  Pressure redistribution mattress            Low Airloss x         Bariatric JERARDO         Bariatric foam           Heel float boots   x  Heel Silicone dressing      x  Float Heels off Bed with Pillows               Barrier wipes         Barrier Cream         Barrier paste          Sacral silicone dressing         Silicone O2 tubing    x     Anchorfast         Cannula fixation Device (Tender )          Gray  Foam Ear protectors           Trach with Optifoam split foam                 Waffle cushion        Waffle Overlay         Rectal tube or BMS    Purwick/Condom Cath          Antifungal tx      Interdry          Reposition q 2 hours    x    Up to chair        Ambulate      PT/OT        Dietician        Diabetes Education      PO   x  TF     TPN     NPO   # days   Other        WOUND TEAM PLAN OF CARE   Dressing changes by wound team:          Follow up 1-2 times weekly:               Follow up 3 times weekly:                NPWT change 3 times weekly:   x  Follow up as needed:       Other (explain):     Anticipated discharge plans:  Pending discharge plans due to medical clearance.  LTACH:        SNF/Rehab:                  Home Care:           Outpatient Wound Center:            Self Care:

## 2020-12-21 NOTE — PROGRESS NOTES
Hospital Medicine Daily Progress Note    Date of Service  12/21/2020    Chief Complaint  70 y.o. male admitted 12/19/2020 with Infected decubitus ulcer and possible sacrum osteomyelitis     Hospital Course  70 y.o. male with C5-C7 complete paraplegia, decubitus ulcers s/p plastic flap surgery by Dr. Moon, neurogenic bladder with indwelling Cazares catheter which was changed on Wednesday (12/16) who presented 12/18/2020 with worsening of decubitus wound.    Patient was referred to ED at [facility] by his home health wound nurse with worsening sacral decubitus wound. The wounds has been becoming deeper and larger with copious serosanguineous discharge and foul smell. Relation with CT of the abdomen pelvis with contrast showed decubitus ulcer overlying the lower sacrum with multiple skin defects and probable sacral osteomyelitis.  Pt follows with Dr. Moon his plastic surgeon who did his flap in August of 2019. At [facility], Pt was seen by wound care and recommending for plastic evaluation. Pt was evaluated by Dr. Cummins plastic surgery and then transferred to [facility] for OR. ID was consulted on admission for sacrum osteomyelitis.    12/20 at [facility]: no acute complain in AM; he underwent irrigation and debridement of sacral wound ulcer.     Interval Problem Update  Vitals reviewed; afebrile.  The rest of the vitals within normal parameters except HR in high 40s-50s.   Pain scale rated about 0 in last 24hrs.    At bedside, no acute complain from patient. Current treatment plan explained and he wonders whether he would need a long Abx course if OR culture results came back negative.     Labs reviewed  OR anaerobic culture in progress    No leukocytosis  Hb 10.8 > 10  .5  Cr 0.64    ESR 71, Cr 4.53    Consultants/Specialty  ID - Dr. Koo  Plastic Surgery - Dr. Cummins     Code Status  Full Code    Disposition  Likely back to group home when medically stable    Discussed with patient, patient's nurse and with multidisciplinary  team during rounds including , pharmacist and charge nurse.      I have performed the physical examination, and reviewed updated ROS and plan today 12/21/2020.  In review of yesterday's note, there are no new changes except as documented above or bolded below.      Review of Systems  Review of Systems   Constitutional: Negative for chills, fever and malaise/fatigue.   HENT: Negative for congestion and sore throat.    Eyes: Negative for blurred vision and double vision.   Respiratory: Negative for cough, sputum production and shortness of breath.    Cardiovascular: Negative for chest pain, palpitations, orthopnea, leg swelling and PND.   Gastrointestinal: Negative for abdominal pain, heartburn, nausea and vomiting.   Genitourinary:        Retention   Musculoskeletal: Negative for myalgias and neck pain.   Neurological: Positive for sensory change (chronic). Negative for dizziness, focal weakness and headaches.   Psychiatric/Behavioral: Negative for depression.        Physical Exam  Temp:  [36.2 °C (97.1 °F)-37.3 °C (99.2 °F)] 37.3 °C (99.1 °F)  Pulse:  [51-87] 59  Resp:  [10-27] 15  BP: ()/(49-68) 132/65  SpO2:  [91 %-100 %] 95 %    Physical Exam  Vitals signs and nursing note reviewed.   Constitutional:       General: He is not in acute distress.     Appearance: Normal appearance. He is not ill-appearing.   HENT:      Head: Normocephalic and atraumatic.      Mouth/Throat:      Mouth: Mucous membranes are moist.      Pharynx: Oropharynx is clear.   Eyes:      General: No scleral icterus.     Conjunctiva/sclera: Conjunctivae normal.   Cardiovascular:      Rate and Rhythm: Normal rate and regular rhythm.      Pulses: Normal pulses.      Heart sounds: Normal heart sounds.   Pulmonary:      Effort: Pulmonary effort is normal. No respiratory distress.      Breath sounds: Normal breath sounds. No wheezing.   Abdominal:      General: Bowel sounds are normal. There is no distension.      Palpations: Abdomen  is soft.      Tenderness: There is no abdominal tenderness.   Musculoskeletal:         General: No swelling or tenderness.   Skin:     General: Skin is warm and dry.      Capillary Refill: Capillary refill takes less than 2 seconds.      Comments: Sacral decubitus ulcer (wound images reviewed in epic)   Neurological:      General: No focal deficit present.      Mental Status: He is alert and oriented to person, place, and time. Mental status is at baseline.   Psychiatric:         Mood and Affect: Mood normal.         Fluids    Intake/Output Summary (Last 24 hours) at 12/21/2020 1027  Last data filed at 12/21/2020 0454  Gross per 24 hour   Intake 700 ml   Output 450 ml   Net 250 ml       Laboratory  Recent Labs     12/18/20  1425 12/19/20  0423 12/21/20  0605   WBC 7.8 8.4 7.5   RBC 3.65* 3.31* 3.13*   HEMOGLOBIN 11.7* 10.8* 10.0*   HEMATOCRIT 35.2* 33.0* 32.4*   MCV 96.4 99.7* 103.5*   MCH 32.1 32.6 31.9   MCHC 33.2* 32.7* 30.9*   RDW 48.8 50.4* 53.3*   PLATELETCT 259 234 218   MPV 8.5* 8.5* 8.5*     Recent Labs     12/18/20  1425 12/19/20  0423 12/21/20  0605   SODIUM 135 138 140   POTASSIUM 4.4 4.4 3.7   CHLORIDE 101 104 111   CO2 22 24 26   GLUCOSE 93 101* 96   BUN 11 13 11   CREATININE 0.52 0.66 0.64   CALCIUM 9.0 8.7 8.7                   Imaging  CT abd/pelvis:   Findings consistent with decubitus ulcer overlying the lower sacrum with multiple skin defects and probable associated sacral osteomyelitis.  Catheter fragment appears to be present in the soft tissues overlying the sacrum and extending into the LEFT   gluteal region.          Assessment/Plan  * Decubitus ulcer- (present on admission)  Assessment & Plan  Concern for sacral osteomyelitis according to CT of the pelvis report.  MRI is not possible due to metal rods in his lower extremities  Patient has been started on empiric antibiotics  Infection disease appreciated - continue zosyn and vancomycin at the moment and then will direct abx to what is  cultures in surgery.  Wound care also consulted  Plastic surgery Dr. Cummins has been consulted - s/p irrigation and debridement of sacral wound ulcer on 12/20      CM to assist with getting a proper working bed at home as well as continue home health support  Anticipate the patient requiring 6 weeks of IV Abx; will place PICC line (order placed)      Neurogenic bladder- (present on admission)  Assessment & Plan  Hutchins catheter was last changed on Wednesday.  Chronic indwelling hutchins catheter due to neurogenic bladder with intermittent cath changes  Pt wonders if he can get a suprapubic catheter placement and reportedly has an appointment with [place] on 12/29  Spoke with urology on call Dr. Palacios - velasquez was reviewed and advised to keep this outpatient appointment instead as Pt will need urodynamic study and other testings that need to be done which can be done at this appointment.     Essential hypertension- (present on admission)  Assessment & Plan  Continue home medications  Monitor blood pressure    Paroxysmal atrial flutter (HCC)- (present on admission)  Assessment & Plan  Heart rate is under control  Xarelto for anti-coag; restarted 12/21.   Continue metoprolol       VTE prophylaxis: Xarelto restarted 12/21    Please note that this dictation was created using voice recognition software. There may be errors I did not discover before finalizing the note.

## 2020-12-21 NOTE — PROGRESS NOTES
Patient back from PACU with PACU RN and PACU CNA. Abd pad and medipore dressing to sacrum, CDI. TAPS system placed to support repositioning. VS stable. Patient denies pain at this time. Will continue to monitor.

## 2020-12-21 NOTE — OP REPORT
DATE OF SERVICE:  12/20/2020     PREOPERATIVE DIAGNOSIS:  Stage IV sacral pressure ulcer.     POSTOPERATIVE DIAGNOSIS:  Stage IV sacral pressure ulcer.     PROCEDURE PERFORMED:  Excisional debridement of sacral pressure ulcer   including skin, subcutaneous tissue, muscle and fascia with sacral bone   biopsy.     SURGEON:  Matt Cummins MD     ASSISTANT:  None.     ANESTHESIOLOGIST:  Jarred Clements MD     ANESTHESIA TYPE:  General.     SPECIMENS:    1.  Deep sacral wound tissue for culture.  2.  Sacral bone for culture.     ESTIMATED BLOOD LOSS:  50 mL     COMPLICATIONS:  None.     FINDINGS:  Total size of skin defect after debridement is 3x5.5 cm with 8 cm   of undermining from the 11 o'clock to 3 o'clock position.     BRIEF HISTORY:  This is a 70-year-old male with a C5 spine injury and   resultant paraplegia who developed a sacral pressure ulcer last year.  He   ultimately underwent a flap closure by Dr. Moon who mobilized the medial   aspects of the bilateral gluteus muscles and advanced them forward for   closure.  The patient has reportedly also had what sounds like an ischial   pressure ulcer in March or April of this year, which was also repaired.    Recently, the patient states that he had an episode where his air mattress   failed, causing prolonged pressure on his backside.  He then developed a new   ulcer just lateral to the midline sacral incision from his prior flap   procedure.  This had been treated with home health, but due to worsening of   the ulcer, he presented to the hospital for further management.  I recommended   debridement in the operating room.  Risks, benefits and alternatives of the   procedure were explained to him and all of his questions answered.     PROCEDURE IN DETAIL:  After informed consent was obtained, the patient was   brought to the operating room.  After induction of general anesthesia, he was   placed in the prone position.  His back side was prepped and draped  in the   usual sterile fashion.  A timeout was called correctly identifying the patient   and procedure.  As stated earlier, the new wound was located just lateral to   the midline incision from his previous flap.  This measured approximately   2.5x5 cm.  There was undermining in the 12-1 o'clock position.  Also noted was   a small separation of the superior most aspect of the previous flap scar.    This did communicate in the subcutaneous plane with the larger ulcer.  Using a   combination of electrocautery and scissors, I debrided all nonviable tissue   at the skin opening site itself as well as the subcutaneous tissue between the   main ulcer and the smaller skin opening.  This included the medial aspect of   the gluteus rogelio muscle, which had been used for his prior flap procedure.    This muscle was trimmed back to healthy viable tissue laterally.  I also   removed some of the underlying paraspinous fascia.  The underside of this was   sent off for deep wound culture.  There was a very thin layer of periosteum at   the base of the wound, and I used a curette to take a bone culture in this   area.  Because the skin between the 2 wound openings had been perfusing well   and was healthy, I kept it intact to try to decrease the total amount of skin   deficit of the wound.  I did caution him that I would have to see how this   skin overlying the wound cavity heals in the postop period.  After all   nonviable tissue had been removed, I then irrigated the wound with 3 liters of   sterile saline via pulse .  Hemostasis was then obtained with   electrocautery.  I then used a scalpel to trim back the skin edges around the   entire circumference of the wound back to healthy bleeding tissue.  The wound   was packed with a moistened sterile Kerlix and covered with an ABD pad and   surgical tape.  The patient was then placed back in the supine position,   extubated and taken to the recovery room in good  condition.        ______________________________  MD KIM MANZANO    DD:  12/20/2020 17:09  DT:  12/20/2020 17:38    Job#:  [id number]

## 2020-12-22 PROBLEM — D75.89 MACROCYTOSIS: Status: ACTIVE | Noted: 2020-12-22

## 2020-12-22 PROBLEM — R79.89 LOW TSH LEVEL: Status: ACTIVE | Noted: 2020-12-22

## 2020-12-22 PROBLEM — G82.50 CHRONIC INCOMPLETE QUADRIPLEGIA (HCC): Status: ACTIVE | Noted: 2019-07-24

## 2020-12-22 LAB
ANION GAP SERPL CALC-SCNC: 7 MMOL/L (ref 7–16)
BASOPHILS # BLD AUTO: 0.3 % (ref 0–1.8)
BASOPHILS # BLD: 0.02 K/UL (ref 0–0.12)
BUN SERPL-MCNC: 9 MG/DL (ref 8–22)
CALCIUM SERPL-MCNC: 8.9 MG/DL (ref 8.5–10.5)
CHLORIDE SERPL-SCNC: 109 MMOL/L (ref 96–112)
CO2 SERPL-SCNC: 24 MMOL/L (ref 20–33)
CREAT SERPL-MCNC: 0.56 MG/DL (ref 0.5–1.4)
EOSINOPHIL # BLD AUTO: 0.19 K/UL (ref 0–0.51)
EOSINOPHIL NFR BLD: 2.7 % (ref 0–6.9)
ERYTHROCYTE [DISTWIDTH] IN BLOOD BY AUTOMATED COUNT: 50.6 FL (ref 35.9–50)
GLUCOSE SERPL-MCNC: 94 MG/DL (ref 65–99)
HCT VFR BLD AUTO: 32.8 % (ref 42–52)
HGB BLD-MCNC: 10.5 G/DL (ref 14–18)
IMM GRANULOCYTES # BLD AUTO: 0.03 K/UL (ref 0–0.11)
IMM GRANULOCYTES NFR BLD AUTO: 0.4 % (ref 0–0.9)
LYMPHOCYTES # BLD AUTO: 1.66 K/UL (ref 1–4.8)
LYMPHOCYTES NFR BLD: 23.6 % (ref 22–41)
MCH RBC QN AUTO: 31.8 PG (ref 27–33)
MCHC RBC AUTO-ENTMCNC: 32 G/DL (ref 33.7–35.3)
MCV RBC AUTO: 99.4 FL (ref 81.4–97.8)
MONOCYTES # BLD AUTO: 0.54 K/UL (ref 0–0.85)
MONOCYTES NFR BLD AUTO: 7.7 % (ref 0–13.4)
NEUTROPHILS # BLD AUTO: 4.59 K/UL (ref 1.82–7.42)
NEUTROPHILS NFR BLD: 65.3 % (ref 44–72)
NRBC # BLD AUTO: 0 K/UL
NRBC BLD-RTO: 0 /100 WBC
PLATELET # BLD AUTO: 241 K/UL (ref 164–446)
PMV BLD AUTO: 8.4 FL (ref 9–12.9)
POTASSIUM SERPL-SCNC: 3.9 MMOL/L (ref 3.6–5.5)
RBC # BLD AUTO: 3.3 M/UL (ref 4.7–6.1)
SODIUM SERPL-SCNC: 140 MMOL/L (ref 135–145)
WBC # BLD AUTO: 7 K/UL (ref 4.8–10.8)

## 2020-12-22 PROCEDURE — 700105 HCHG RX REV CODE 258: Performed by: INTERNAL MEDICINE

## 2020-12-22 PROCEDURE — 80048 BASIC METABOLIC PNL TOTAL CA: CPT

## 2020-12-22 PROCEDURE — 700111 HCHG RX REV CODE 636 W/ 250 OVERRIDE (IP): Performed by: INTERNAL MEDICINE

## 2020-12-22 PROCEDURE — A9270 NON-COVERED ITEM OR SERVICE: HCPCS | Performed by: INTERNAL MEDICINE

## 2020-12-22 PROCEDURE — 700102 HCHG RX REV CODE 250 W/ 637 OVERRIDE(OP): Performed by: INTERNAL MEDICINE

## 2020-12-22 PROCEDURE — 700102 HCHG RX REV CODE 250 W/ 637 OVERRIDE(OP): Performed by: STUDENT IN AN ORGANIZED HEALTH CARE EDUCATION/TRAINING PROGRAM

## 2020-12-22 PROCEDURE — A9270 NON-COVERED ITEM OR SERVICE: HCPCS | Performed by: STUDENT IN AN ORGANIZED HEALTH CARE EDUCATION/TRAINING PROGRAM

## 2020-12-22 PROCEDURE — 99233 SBSQ HOSP IP/OBS HIGH 50: CPT | Performed by: FAMILY MEDICINE

## 2020-12-22 PROCEDURE — 700101 HCHG RX REV CODE 250: Performed by: INTERNAL MEDICINE

## 2020-12-22 PROCEDURE — 770001 HCHG ROOM/CARE - MED/SURG/GYN PRIV*

## 2020-12-22 PROCEDURE — 85025 COMPLETE CBC W/AUTO DIFF WBC: CPT

## 2020-12-22 RX ADMIN — BACLOFEN 20 MG: 10 TABLET ORAL at 09:42

## 2020-12-22 RX ADMIN — DOCUSATE SODIUM 100 MG: 100 CAPSULE ORAL at 17:44

## 2020-12-22 RX ADMIN — FERROUS SULFATE TAB 325 MG (65 MG ELEMENTAL FE) 325 MG: 325 (65 FE) TAB at 17:44

## 2020-12-22 RX ADMIN — TRAZODONE HYDROCHLORIDE 50 MG: 50 TABLET ORAL at 21:51

## 2020-12-22 RX ADMIN — DOCUSATE SODIUM 50 MG AND SENNOSIDES 8.6 MG 2 TABLET: 8.6; 5 TABLET, FILM COATED ORAL at 17:45

## 2020-12-22 RX ADMIN — PIPERACILLIN AND TAZOBACTAM 4.5 G: 4; .5 INJECTION, POWDER, LYOPHILIZED, FOR SOLUTION INTRAVENOUS; PARENTERAL at 21:52

## 2020-12-22 RX ADMIN — POLYETHYLENE GLYCOL 3350 1 PACKET: 17 POWDER, FOR SOLUTION ORAL at 17:44

## 2020-12-22 RX ADMIN — BACLOFEN 20 MG: 10 TABLET ORAL at 17:44

## 2020-12-22 RX ADMIN — BACLOFEN 20 MG: 10 TABLET ORAL at 14:09

## 2020-12-22 RX ADMIN — AMLODIPINE BESYLATE 10 MG: 10 TABLET ORAL at 05:19

## 2020-12-22 RX ADMIN — FERROUS SULFATE TAB 325 MG (65 MG ELEMENTAL FE) 325 MG: 325 (65 FE) TAB at 05:19

## 2020-12-22 RX ADMIN — PIPERACILLIN AND TAZOBACTAM 4.5 G: 4; .5 INJECTION, POWDER, LYOPHILIZED, FOR SOLUTION INTRAVENOUS; PARENTERAL at 05:19

## 2020-12-22 RX ADMIN — RIVAROXABAN 20 MG: 20 TABLET, FILM COATED ORAL at 17:44

## 2020-12-22 RX ADMIN — DOCUSATE SODIUM 50 MG AND SENNOSIDES 8.6 MG 2 TABLET: 8.6; 5 TABLET, FILM COATED ORAL at 05:19

## 2020-12-22 RX ADMIN — LOSARTAN POTASSIUM 50 MG: 50 TABLET, FILM COATED ORAL at 21:51

## 2020-12-22 RX ADMIN — PIPERACILLIN AND TAZOBACTAM 4.5 G: 4; .5 INJECTION, POWDER, LYOPHILIZED, FOR SOLUTION INTRAVENOUS; PARENTERAL at 14:09

## 2020-12-22 RX ADMIN — DOCUSATE SODIUM 100 MG: 100 CAPSULE ORAL at 05:19

## 2020-12-22 RX ADMIN — BACLOFEN 20 MG: 10 TABLET ORAL at 21:51

## 2020-12-22 ASSESSMENT — ENCOUNTER SYMPTOMS
CHILLS: 0
PALPITATIONS: 0
FLANK PAIN: 0
DIARRHEA: 0
HEARTBURN: 0
SPUTUM PRODUCTION: 0
MYALGIAS: 0
WEAKNESS: 1
NERVOUS/ANXIOUS: 0
SENSORY CHANGE: 1
NECK PAIN: 0
NAUSEA: 0
FEVER: 0
HEADACHES: 0
BLURRED VISION: 0
FOCAL WEAKNESS: 1
BACK PAIN: 0
SHORTNESS OF BREATH: 0
WHEEZING: 0
ABDOMINAL PAIN: 0
DIZZINESS: 0
VOMITING: 0
COUGH: 0
SORE THROAT: 0

## 2020-12-22 ASSESSMENT — PAIN DESCRIPTION - PAIN TYPE: TYPE: ACUTE PAIN

## 2020-12-22 NOTE — PROGRESS NOTES
A&O x 4. Patient calls appropriately.  Q2 turns in place. Low airloss mattress. Float boots in place  Patient has 0/10 pain.   Denies N&V. Tolerating regular diet.  Wound vac to sacral wound. Dressings to LE wounds, to be changed today   + void via hutchins, + dark output via ostomy.  Patient denies SOB.    Review plan with of care with patient. Call light and personal belongings with in reach. Hourly rounding in place. All needs met at this time.

## 2020-12-22 NOTE — PROGRESS NOTES
[facility] Medicine Daily Progress Note    Date of Service  12/22/2020    Chief Complaint  70 y.o. male admitted 12/19/2020 with Infected decubitus ulcer    Hospital Course  Admitted as a transfer from [facility] secondary to infected decubitus ulcer.  Patient with known history of incomplete quadriplegia, chronic sacral decubitus ulcer, previous flap surgery by plastic surgery.  CT scan of the pelvis showed decubitus ulcer overlying the lower sacrum with multiple skin defects and probable associated sacral osteomyelitis.  Wound culture showed ESBL Proteus.  Plastic surgery was consulted on the case and requested transfer to [facility].  He underwent Excisional debridement of sacral pressure ulcer including skin, subcutaneous tissue, muscle and fascia with sacral bone biopsy on 12/20/2020.  Infectious disease was consulted on the case, he is currently on IV Zosyn. PICC line was placed on 12/21/2020    Interval Problem Update  Decubitus ulcer -pain controlled, culture showed ESBL Proteus  HTN - sbp 100-140    Consultants/Specialty  ID  Plastic Surgery    Code Status  Full Code    Disposition  LTAC vs Rehab    Review of Systems  Review of Systems   Constitutional: Negative for chills, fever and malaise/fatigue.   HENT: Negative for congestion, hearing loss and sore throat.    Eyes: Negative for blurred vision.   Respiratory: Negative for cough, shortness of breath and wheezing.    Cardiovascular: Negative for chest pain, palpitations and leg swelling.   Gastrointestinal: Negative for abdominal pain, diarrhea, heartburn, nausea and vomiting.   Genitourinary: Negative for dysuria, flank pain and hematuria.   Musculoskeletal: Negative for back pain, joint pain, myalgias and neck pain.   Skin: Negative for rash.   Neurological: Positive for sensory change, focal weakness and weakness. Negative for dizziness and headaches.   Psychiatric/Behavioral: The patient is not nervous/anxious.         Physical Exam  Temp:   [37.1 °C (98.7 °F)-37.7 °C (99.8 °F)] 37.2 °C (98.9 °F)  Pulse:  [53-61] 55  Resp:  [15-17] 16  BP: (101-148)/(53-71) 101/53  SpO2:  [95 %-99 %] 99 %    Physical Exam  Vitals signs and nursing note reviewed.   HENT:      Head: Normocephalic and atraumatic.      Mouth/Throat:      Mouth: Mucous membranes are moist.   Eyes:      Extraocular Movements: Extraocular movements intact.      Conjunctiva/sclera: Conjunctivae normal.      Pupils: Pupils are equal, round, and reactive to light.   Cardiovascular:      Rate and Rhythm: Normal rate and regular rhythm.   Pulmonary:      Effort: Pulmonary effort is normal.      Breath sounds: Normal breath sounds. No wheezing.   Abdominal:      General: Bowel sounds are normal. There is no distension.      Palpations: Abdomen is soft.      Tenderness: There is no abdominal tenderness. There is no guarding or rebound.      Comments: ostomy   Musculoskeletal:      Right lower leg: No edema.      Left lower leg: No edema.   Skin:     General: Skin is warm and dry.      Comments: Sacral decubitus ulcer (wound images reviewed in epic)   Neurological:      Mental Status: He is alert and oriented to person, place, and time.      Cranial Nerves: No cranial nerve deficit.      Motor: Weakness (MMT BUE 2-3/5, BLE 0/5) present.         Fluids    Intake/Output Summary (Last 24 hours) at 12/22/2020 1120  Last data filed at 12/22/2020 0824  Gross per 24 hour   Intake 360 ml   Output 2600 ml   Net -2240 ml       Laboratory  Recent Labs     12/21/20  0605 12/22/20  0525   WBC 7.5 7.0   RBC 3.13* 3.30*   HEMOGLOBIN 10.0* 10.5*   HEMATOCRIT 32.4* 32.8*   .5* 99.4*   MCH 31.9 31.8   MCHC 30.9* 32.0*   RDW 53.3* 50.6*   PLATELETCT 218 241   MPV 8.5* 8.4*     Recent Labs     12/21/20  0605 12/22/20  0525   SODIUM 140 140   POTASSIUM 3.7 3.9   CHLORIDE 111 109   CO2 26 24   GLUCOSE 96 94   BUN 11 9   CREATININE 0.64 0.56   CALCIUM 8.7 8.9                   Imaging  CT abd/pelvis:   Findings  consistent with decubitus ulcer overlying the lower sacrum with multiple skin defects and probable associated sacral osteomyelitis.  Catheter fragment appears to be present in the soft tissues overlying the sacrum and extending into the LEFT   gluteal region.          Assessment/Plan  * Decubitus ulcer- (present on admission)  Assessment & Plan  Excisional debridement of sacral pressure ulcer including skin, subcutaneous tissue, muscle and fascia with sacral bone   Biopsy.12/20/2020  Pain control  Wound care  IV Zosyn  Follow cultures  PICC line placement 12/21/2020    Macrocytosis- (present on admission)  Assessment & Plan  Check b12 and folate    Low TSH level- (present on admission)  Assessment & Plan  Check FT3    Anemia- (present on admission)  Assessment & Plan  Follow cbc    Neurogenic bladder- (present on admission)  Assessment & Plan  Cazares catheter (changed 12/16/2020)  Follow-up with urology as outpatient      Chronic incomplete quadriplegia (HCC)- (present on admission)  Assessment & Plan  PT and OT    Essential hypertension- (present on admission)  Assessment & Plan  Losartan, amlodipine    Paroxysmal atrial flutter (HCC)- (present on admission)  Assessment & Plan  Xarelto  Hold Metoprolol due to bradycardia       VTE prophylaxis: Xarelto

## 2020-12-22 NOTE — PROGRESS NOTES
Infectious Disease Progress Note    Author: Hanh Hughes M.D. Date & Time created: 12/22/2020  2:26 PM    Interval History:  POD #2 - I&D sacral wound with bone biopsy and deep tissue culture.    Abx: Zosyn: day #4    Previous: Vanco    12/18: Wound culture: Proteus: ESBL. Sensitive to zosyn  12/20: Surgical wounds NGTD on bone and deep tissue.  12/21: No fevers. Did well overnight. No acute issues.   12/22: Seen by [facility].  Pt prefers to go back to [facility].    Labs Reviewed, Medications Reviewed, Radiology Reviewed, Wound Reviewed, Fluids Reviewed and GI Nutrition Reviewed    Review of Systems:  Review of Systems   Constitutional: Negative for chills and fever.   Respiratory: Negative for cough and sputum production.    Cardiovascular: Negative for chest pain.   Gastrointestinal: Negative for abdominal pain, nausea and vomiting.   Skin: Negative for rash.       Physical Exam:  Physical Exam  Cardiovascular:      Rate and Rhythm: Normal rate and regular rhythm.   Pulmonary:      Effort: Pulmonary effort is normal.      Breath sounds: Normal breath sounds.   Abdominal:      General: Abdomen is flat. Bowel sounds are normal.   Skin:     Comments: Wound vac   Neurological:      Mental Status: He is alert.      Comments: Quadraplegia   Psychiatric:         Mood and Affect: Mood normal.         Behavior: Behavior normal.         Labs:  Recent Results (from the past 24 hour(s))   CBC WITH DIFFERENTIAL    Collection Time: 12/22/20  5:25 AM   Result Value Ref Range    WBC 7.0 4.8 - 10.8 K/uL    RBC 3.30 (L) 4.70 - 6.10 M/uL    Hemoglobin 10.5 (L) 14.0 - 18.0 g/dL    Hematocrit 32.8 (L) 42.0 - 52.0 %    MCV 99.4 (H) 81.4 - 97.8 fL    MCH 31.8 27.0 - 33.0 pg    MCHC 32.0 (L) 33.7 - 35.3 g/dL    RDW 50.6 (H) 35.9 - 50.0 fL    Platelet Count 241 164 - 446 K/uL    MPV 8.4 (L) 9.0 - 12.9 fL    Neutrophils-Polys 65.30 44.00 - 72.00 %    Lymphocytes 23.60 22.00 - 41.00 %    Monocytes 7.70 0.00 - 13.40 %    Eosinophils  2.70 0.00 - 6.90 %    Basophils 0.30 0.00 - 1.80 %    Immature Granulocytes 0.40 0.00 - 0.90 %    Nucleated RBC 0.00 /100 WBC    Neutrophils (Absolute) 4.59 1.82 - 7.42 K/uL    Lymphs (Absolute) 1.66 1.00 - 4.80 K/uL    Monos (Absolute) 0.54 0.00 - 0.85 K/uL    Eos (Absolute) 0.19 0.00 - 0.51 K/uL    Baso (Absolute) 0.02 0.00 - 0.12 K/uL    Immature Granulocytes (abs) 0.03 0.00 - 0.11 K/uL    NRBC (Absolute) 0.00 K/uL   Basic Metabolic Panel    Collection Time: 12/22/20  5:25 AM   Result Value Ref Range    Sodium 140 135 - 145 mmol/L    Potassium 3.9 3.6 - 5.5 mmol/L    Chloride 109 96 - 112 mmol/L    Co2 24 20 - 33 mmol/L    Glucose 94 65 - 99 mg/dL    Bun 9 8 - 22 mg/dL    Creatinine 0.56 0.50 - 1.40 mg/dL    Calcium 8.9 8.5 - 10.5 mg/dL    Anion Gap 7.0 7.0 - 16.0   ESTIMATED GFR    Collection Time: 12/22/20  5:25 AM   Result Value Ref Range    GFR If African American >60 >60 mL/min/1.73 m 2    GFR If Non African American >60 >60 mL/min/1.73 m 2     Results     Procedure Component Value Units Date/Time    Anaerobic Culture [[id number]] Collected: 12/20/20 1605    Order Status: Completed Specimen: Wound Updated: 12/22/20 1241     Significant Indicator NEG     Source WND     Site SACRAL BONE     Culture Result Culture in progress.    Narrative:      Surgery - swabs received    CULTURE WOUND W/ GRAM STAIN [[id number]] Collected: 12/20/20 1605    Order Status: Completed Specimen: Wound Updated: 12/22/20 1241     Significant Indicator NEG     Source WND     Site SACRAL BONE     Culture Result No growth at 48 hours.     Gram Stain Result Rare WBCs.  No organisms seen.      Narrative:      Surgery - swabs received    CULTURE WOUND W/ GRAM STAIN [[id number]] Collected: 12/20/20 1605    Order Status: Completed Specimen: Wound Updated: 12/22/20 1241     Significant Indicator NEG     Source WND     Site SACRAL DEEP WOUND     Culture Result No growth at 48 hours.     Gram Stain Result Few WBCs.  No organisms seen.       Narrative:      Surgery - swabs received    Anaerobic Culture [[id number]] Collected: 20 160    Order Status: Completed Specimen: Wound Updated: 20 1241     Significant Indicator NEG     Source WND     Site SACRAL DEEP WOUND     Culture Result Culture in progress.    Narrative:      Surgery - swabs received    GRAM STAIN [[id number]] Collected: 20 1605    Order Status: Completed Specimen: Wound Updated: 20 1256     Significant Indicator .     Source WND     Site SACRAL BONE     Gram Stain Result Rare WBCs.  No organisms seen.      Narrative:      Surgery - swabs received    GRAM STAIN [[id number]] Collected: 20 160    Order Status: Completed Specimen: Wound Updated: 20 1256     Significant Indicator .     Source WND     Site SACRAL DEEP WOUND     Gram Stain Result Few WBCs.  No organisms seen.      Narrative:      Surgery - swabs received        Hemodynamics:  Temp (24hrs), Av.3 °C (99.2 °F), Min:37.1 °C (98.7 °F), Max:37.7 °C (99.8 °F)  Temperature: 37.2 °C (98.9 °F)  Pulse  Av.5  Min: 46  Max: 87   Blood Pressure : 101/53     Peripheral IV 20 G Right Wrist (Active)   Site Assessment Clean;Dry;Intact 20   Dressing Type Occlusive;Transparent Film 20   Line Status Flushed;Saline locked 20   Dressing Status Clean;Dry;Intact 20   Dressing Intervention N/A 20   Dressing Change Due 20 1000   Date Primary Tubing Changed 20 1000   Date Secondary Tubing Changed 20 1000   NEXT Primary Tubing Change  20 1000   NEXT Secondary Tubing Change  20 1000   Infiltration Grading ([facility], [facility] [place]) 0 20   Phlebitis Scale ([facility] Only) 0 20       PICC Single Lumen 20 Right Basilic (Active)   Site Assessment Intact;Clean;Dry 2045   Line Status Blood return noted;Flushed;Infusing 20   Line Secured at (cm) 0 cm 20 1515    Extremity Circumference (cm) 32 cm 12/21/20 1515   Dressing Type Biopatch;Occlusive;Primapore;Securing device;Skin barrier;Transparent 12/22/20 0745   Dressing Status Clean;Dry;Intact 12/22/20 0745   Dressing Intervention N/A 12/22/20 0745   Dressing Change Due 12/26/20 12/22/20 0745   Date Primary Tubing Changed 12/21/20 12/22/20 0745   Date Secondary Tubing Changed 12/21/20 12/22/20 0745   Date IV Connector(s) Changed 12/21/20 12/22/20 0745   NEXT Primary Tubing Change  12/24/20 12/22/20 0745   NEXT Secondary Tubing Change  12/22/20 12/22/20 0745   NEXT IV Connector(s) Change 12/24/20 12/22/20 0745   Line Necessity Assessed Antibiotic Therapy Greater than 7 Days 12/22/20 0745   $ Single Lumen PICC Charge Single kit used 12/21/20 1515     Wound:  @WOUNDLDA(4)@     Fluids:  Intake/Output       12/20/20 0700 - 12/21/20 0659 12/21/20 0700 - 12/22/20 0659 12/22/20 0700 - 12/23/20 0659      4591-1022 7847-0458 Total 0629-8412 4589-5588 Total 9272-3673 2873-4540 Total       Intake    P.O.  0  -- 0  --  360 360  --  -- --    P.O. 0 -- 0 -- 360 360 -- -- --    I.V.  600  -- 600  --  -- --  --  -- --    Surgery Volume (Surgery Intake) 300 -- 300 -- -- -- -- -- --    Volume (mL) (Lactated Ringers) 300 -- 300 -- -- -- -- -- --    IV Piggyback  --  100 100  --  -- --  --  -- --    Volume (mL) (piperacillin-tazobactam (ZOSYN) 3.375 g in  mL IVPB) -- 100 100 -- -- -- -- -- --    Total Intake 600 100 700 -- 360 360 -- -- --       Output    Urine  225  450 675  600  1650 2250  350  -- 350    Urine Void (mL) 225 -- 225 -- -- -- -- -- --    Output (mL) (Urethral Catheter) -- 450  2250 350 -- 350    Drains  --  -- --  --  0 0  --  -- --    Output (mL) (Negative Pressure Wound Therapy 12/21/20 Surgical Sacrum Left) -- -- -- -- 0 0 -- -- --    Total Output 225 450  2250 350 -- 350       Net I/O     375 -350 25 -600 -1290 -1890 -350 -- -350           GI/Nutrition:  Orders Placed This Encounter    Procedures   • Diet Order Diet: Regular     Standing Status:   Standing     Number of Occurrences:   1     Order Specific Question:   Diet:     Answer:   Regular [1]     Medications:  Current Facility-Administered Medications   Medication Last Admin   • piperacillin-tazobactam (ZOSYN) 4.5 g in  mL IVPB 4.5 g at 12/22/20 1409   • docusate sodium (COLACE) capsule 100 mg 100 mg at 12/22/20 0519   • rivaroxaban (XARELTO) tablet 20 mg 20 mg at 12/21/20 1737   • ondansetron (ZOFRAN ODT) dispertab 4 mg     • ondansetron (ZOFRAN) syringe/vial injection 4 mg     • senna-docusate (PERICOLACE or SENOKOT S) 8.6-50 MG per tablet 2 Tab 2 Tab at 12/22/20 0519    And   • polyethylene glycol/lytes (MIRALAX) PACKET 1 Packet      And   • magnesium hydroxide (MILK OF MAGNESIA) suspension 30 mL      And   • bisacodyl (DULCOLAX) suppository 10 mg     • acetaminophen (Tylenol) tablet 650 mg 650 mg at 12/19/20 1850   • Pharmacy Consult Request ...Pain Management Review 1 Each      And   • oxyCODONE immediate-release (ROXICODONE) tablet 5 mg      And   • oxyCODONE immediate release (ROXICODONE) tablet 10 mg      And   • morphine (pf) 4 mg/mL injection 4 mg     • amLODIPine (NORVASC) tablet 10 mg 10 mg at 12/22/20 0519   • baclofen (LIORESAL) tablet 20 mg 20 mg at 12/22/20 1409   • ferrous sulfate tablet 325 mg 325 mg at 12/22/20 0519   • fluticasone (FLONASE) nasal spray 100 mcg     • losartan (COZAAR) tablet 50 mg 50 mg at 12/21/20 2133   • traZODone (DESYREL) tablet 50 mg       Medical Decision Making, by Problem:  Active Hospital Problems    Diagnosis   • *Decubitus ulcer [L89.90]   • Paroxysmal atrial flutter (HCC) [I48.92]   • Essential hypertension [I10]   • Neurogenic bladder [N31.9]     Decubitus ulcer and sacral osteomyelitis  Ulcer debridement with flap closure (8/21/2019)  A-fib   Atrial flutter   GERD   Hypertension  Neurogenic bladder - Cazares dependent  Quadriplegia, C5-C7 complete  Percutaneouos pinning lower  extremity  Colostomy with subsequent colostomy takedown     PLAN:  POD #2 - pending deep tissue and bone cultures. So far NGTD  Wound culture taken at [facility] is a Proteus ESBL. Zosyn sensitive.   Anticipate 6 weeks of therapy for him   Wound care imperative to patients success  LTACH dispo probably a good idea..   OK for PICC  Adjsut Zosyn to 4.5mg IV - extended infusion     Cultures from 12/20 remain negative.  Will need prolonged abx and wound care.  He prefers to go back to [facility].  Think it would better to go to LTAC or acute Rehab to allow for MD follow up of wound.  Too difficult to follow as outpatient and transfer pt to office for follow up.  Out office is not equipped with lift to examine wound.  Wound care would need to send photos and documentation of wound care.  Have not had good experience with [facility] following other orders from my office.    Discussed with pt.  Greater than 30 min. Over 50% of that time was in direct face to face care and counseling.

## 2020-12-22 NOTE — PROGRESS NOTES
Report received from day shift RN, assumed Care.   Patient is AOx4, responds appropriately.      Pain controlled at this time.  Patient is tolerating regular diet, denies nausea/vomiting.     Plan of care discussed, all questions answered.    Educated on use of call light and importance of calling when assistance is required. Pt verbalizes understanding.    Call light and belongings within reach, treaded slipper socks on, bed in lowest locked position.  All needs met at this time.

## 2020-12-22 NOTE — PROGRESS NOTES
2 RN skin check complete.   Devices in place: heel float boots, pulse ox, MARGARITA PICC, PIV x1, hutchins catheter, ostomy, and wound vac to the sacrum.  Skin assessed under devices: yes.  Skin overall flaky and dry. Ulcers to right heel and lateral portion of right foot. Ulcer to left anterior foot. Dressings in place to both feet.    No new pressure ulcers noted. Wound care already seeing patient.   The following interventions in place: Low air loss mattress, Q2 turns, heel float boots, ostomy, and hutchins catheter.

## 2020-12-22 NOTE — PROGRESS NOTES
Infectious Disease Progress Note    Author: Damian Koo M.D. Date & Time created: 12/21/2020  5:17 PM    Interval History:  POD #1 - I&D sacral wound with bone biopsy and deep tissue culture.    Abx: Zosyn: day #3    Previous: Vanco    12/18: Wound culture: Proteus: ESBL. Sensitive to zosyn  12/20: Surgical wounds NGTD on bone and deep tissue.  12/21: No fevers. Did well overnight. No acute issues.     Labs Reviewed, Medications Reviewed, Radiology Reviewed, Wound Reviewed, Fluids Reviewed and GI Nutrition Reviewed    Review of Systems:  Review of Systems   Constitutional: Negative for chills and fever.   Respiratory: Negative for cough and sputum production.    Cardiovascular: Negative for chest pain.   Gastrointestinal: Negative for abdominal pain, nausea and vomiting.   Skin: Negative for rash.       Physical Exam:  Physical Exam  Cardiovascular:      Rate and Rhythm: Normal rate and regular rhythm.   Pulmonary:      Effort: Pulmonary effort is normal.      Breath sounds: Normal breath sounds.   Abdominal:      General: Abdomen is flat. Bowel sounds are normal.   Skin:     Comments: Wound vac   Neurological:      Mental Status: He is alert.      Comments: Quadraplegia   Psychiatric:         Mood and Affect: Mood normal.         Behavior: Behavior normal.         Labs:  Recent Results (from the past 24 hour(s))   VANCOMYCIN TROUGH LEVEL    Collection Time: 12/20/20  6:09 PM   Result Value Ref Range    Vancomycin Trough 7.2 (L) 10.0 - 20.0 ug/mL   CBC WITH DIFFERENTIAL    Collection Time: 12/21/20  6:05 AM   Result Value Ref Range    WBC 7.5 4.8 - 10.8 K/uL    RBC 3.13 (L) 4.70 - 6.10 M/uL    Hemoglobin 10.0 (L) 14.0 - 18.0 g/dL    Hematocrit 32.4 (L) 42.0 - 52.0 %    .5 (H) 81.4 - 97.8 fL    MCH 31.9 27.0 - 33.0 pg    MCHC 30.9 (L) 33.7 - 35.3 g/dL    RDW 53.3 (H) 35.9 - 50.0 fL    Platelet Count 218 164 - 446 K/uL    MPV 8.5 (L) 9.0 - 12.9 fL    Neutrophils-Polys 68.40 44.00 - 72.00 %    Lymphocytes  18.20 (L) 22.00 - 41.00 %    Monocytes 9.10 0.00 - 13.40 %    Eosinophils 3.10 0.00 - 6.90 %    Basophils 0.40 0.00 - 1.80 %    Immature Granulocytes 0.80 0.00 - 0.90 %    Nucleated RBC 0.00 /100 WBC    Neutrophils (Absolute) 5.13 1.82 - 7.42 K/uL    Lymphs (Absolute) 1.36 1.00 - 4.80 K/uL    Monos (Absolute) 0.68 0.00 - 0.85 K/uL    Eos (Absolute) 0.23 0.00 - 0.51 K/uL    Baso (Absolute) 0.03 0.00 - 0.12 K/uL    Immature Granulocytes (abs) 0.06 0.00 - 0.11 K/uL    NRBC (Absolute) 0.00 K/uL   Basic Metabolic Panel    Collection Time: 12/21/20  6:05 AM   Result Value Ref Range    Sodium 140 135 - 145 mmol/L    Potassium 3.7 3.6 - 5.5 mmol/L    Chloride 111 96 - 112 mmol/L    Co2 26 20 - 33 mmol/L    Glucose 96 65 - 99 mg/dL    Bun 11 8 - 22 mg/dL    Creatinine 0.64 0.50 - 1.40 mg/dL    Calcium 8.7 8.5 - 10.5 mg/dL    Anion Gap 3.0 (L) 7.0 - 16.0   ESTIMATED GFR    Collection Time: 12/21/20  6:05 AM   Result Value Ref Range    GFR If African American >60 >60 mL/min/1.73 m 2    GFR If Non African American >60 >60 mL/min/1.73 m 2     Results     Procedure Component Value Units Date/Time    CULTURE WOUND W/ GRAM STAIN [[id number]] Collected: 12/20/20 1605    Order Status: Completed Specimen: Wound Updated: 12/21/20 1710     Significant Indicator NEG     Source WND     Site SACRAL BONE     Culture Result No growth at 24 hours.     Gram Stain Result Rare WBCs.  No organisms seen.      Narrative:      Surgery - swabs received    Anaerobic Culture [[id number]] Collected: 12/20/20 1605    Order Status: Completed Specimen: Wound Updated: 12/21/20 1710     Significant Indicator NEG     Source WND     Site SACRAL BONE     Culture Result Culture in progress.    Narrative:      Surgery - swabs received    CULTURE WOUND W/ GRAM STAIN [[id number]] Collected: 12/20/20 7232    Order Status: Completed Specimen: Wound Updated: 12/21/20 3519     Significant Indicator NEG     Source WND     Site SACRAL DEEP WOUND     Culture Result No growth  at 24 hours.     Gram Stain Result Few WBCs.  No organisms seen.      Narrative:      Surgery - swabs received    Anaerobic Culture [[id number]] Collected: 20 1605    Order Status: Completed Specimen: Wound Updated: 20 1710     Significant Indicator NEG     Source WND     Site SACRAL DEEP WOUND     Culture Result Culture in progress.    Narrative:      Surgery - swabs received    GRAM STAIN [[id number]] Collected: 20 1605    Order Status: Completed Specimen: Wound Updated: 20 1256     Significant Indicator .     Source WND     Site SACRAL BONE     Gram Stain Result Rare WBCs.  No organisms seen.      Narrative:      Surgery - swabs received    GRAM STAIN [[id number]] Collected: 20 1605    Order Status: Completed Specimen: Wound Updated: 20 1256     Significant Indicator .     Source WND     Site SACRAL DEEP WOUND     Gram Stain Result Few WBCs.  No organisms seen.      Narrative:      Surgery - swabs received        Hemodynamics:  Temp (24hrs), Av.2 °C (98.9 °F), Min:36.2 °C (97.1 °F), Max:37.4 °C (99.3 °F)  Temperature: 37.3 °C (99.2 °F)  Pulse  Av.3  Min: 46  Max: 87   Blood Pressure : 148/62     Peripheral IV 20 G Right Wrist (Active)   Site Assessment Clean;Dry;Intact 20 1000   Dressing Type Occlusive;Transparent Film 20 1000   Line Status Scrubbed the hub prior to access;Flushed;Infusing 20 1000   Dressing Status Clean;Dry;Intact 20 1000   Dressing Intervention N/A 20 1000   Dressing Change Due 20 1000   Date Primary Tubing Changed 20 1000   Date Secondary Tubing Changed 20 1000   NEXT Primary Tubing Change  20 1000   NEXT Secondary Tubing Change  20 1000   Infiltration Grading ([facility] 0 20 1000   Phlebitis Scale ([facility] Only) 0 20 1000       PICC Single Lumen 20 Right Basilic (Active)   Site Assessment Intact;Clean;Dry 20 1515   Line  Status Blood return noted;Flushed;Saline locked 12/21/20 1515   Line Secured at (cm) 0 cm 12/21/20 1515   Extremity Circumference (cm) 32 cm 12/21/20 1515   Dressing Type Biopatch;Occlusive;Primapore;Securing device;Skin barrier;Transparent 12/21/20 1515   Dressing Status Clean;Dry;Intact 12/21/20 1515   Dressing Intervention Initial dressing 12/21/20 1515   Dressing Change Due 12/26/20 12/21/20 1515   Date IV Connector(s) Changed 12/21/20 12/21/20 1515   NEXT IV Connector(s) Change 12/24/20 12/21/20 1515   Line Necessity Assessed Antibiotic Therapy Greater than 7 Days 12/21/20 1515   $ Single Lumen PICC Charge Single kit used 12/21/20 1515     Wound:  @WOUNDLDA(4)@     Fluids:  Intake/Output       12/19/20 0700 - 12/20/20 0659 12/20/20 0700 - 12/21/20 0659 12/21/20 0700 - 12/22/20 0659      0980-7838 3178-4225 Total 3628-7578 3074-8193 Total 4971-8778 5150-5383 Total       Intake    P.O.  --  -- --  0  -- 0  --  -- --    P.O. -- -- -- 0 -- 0 -- -- --    I.V.  --  -- --  600  -- 600  --  -- --    Surgery Volume (Surgery Intake) -- -- -- 300 -- 300 -- -- --    Volume (mL) (Lactated Ringers) -- -- -- 300 -- 300 -- -- --    IV Piggyback  --  -- --  --  100 100  --  -- --    Volume (mL) (piperacillin-tazobactam (ZOSYN) 3.375 g in  mL IVPB) -- -- -- -- 100 100 -- -- --    Total Intake -- -- -- 600 100 700 -- -- --       Output    Urine  --  1350 1350  225  450 675  600  -- 600    Urine Void (mL) -- 1350 1350 225 -- 225 -- -- --    Output (mL) (Urethral Catheter) -- -- -- -- 450 450 600 -- 600    Stool  --  100 100  --  -- --  --  -- --    Measurable Stool (mL) -- 100 100 -- -- -- -- -- --    Total Output -- 1450 1450 225 450 675 600 -- 600       Net I/O     -- -1450 -1450 375 -350 25 -600 -- -600           GI/Nutrition:  Orders Placed This Encounter   Procedures   • Diet Order Diet: Regular     Standing Status:   Standing     Number of Occurrences:   1     Order Specific Question:   Diet:     Answer:   Regular [1]      Medications:  Current Facility-Administered Medications   Medication Last Admin   • piperacillin-tazobactam (ZOSYN) 4.5 g in  mL IVPB 4.5 g at 12/21/20 1442   • docusate sodium (COLACE) capsule 100 mg 100 mg at 12/21/20 0637   • rivaroxaban (XARELTO) tablet 20 mg     • ondansetron (ZOFRAN ODT) dispertab 4 mg     • ondansetron (ZOFRAN) syringe/vial injection 4 mg     • senna-docusate (PERICOLACE or SENOKOT S) 8.6-50 MG per tablet 2 Tab 2 Tab at 12/21/20 0637    And   • polyethylene glycol/lytes (MIRALAX) PACKET 1 Packet      And   • magnesium hydroxide (MILK OF MAGNESIA) suspension 30 mL      And   • bisacodyl (DULCOLAX) suppository 10 mg     • acetaminophen (Tylenol) tablet 650 mg 650 mg at 12/19/20 1850   • Pharmacy Consult Request ...Pain Management Review 1 Each      And   • oxyCODONE immediate-release (ROXICODONE) tablet 5 mg      And   • oxyCODONE immediate release (ROXICODONE) tablet 10 mg      And   • morphine (pf) 4 mg/mL injection 4 mg     • amLODIPine (NORVASC) tablet 10 mg 10 mg at 12/21/20 0637   • baclofen (LIORESAL) tablet 20 mg 20 mg at 12/21/20 1442   • ferrous sulfate tablet 325 mg 325 mg at 12/21/20 0637   • fluticasone (FLONASE) nasal spray 100 mcg     • losartan (COZAAR) tablet 50 mg 50 mg at 12/19/20 2149   • traZODone (DESYREL) tablet 50 mg       Medical Decision Making, by Problem:  Active Hospital Problems    Diagnosis   • *Decubitus ulcer [L89.90]   • Paroxysmal atrial flutter (HCC) [I48.92]   • Essential hypertension [I10]   • Neurogenic bladder [N31.9]     Decubitus ulcer and sacral osteomyelitis  Ulcer debridement with fllap closure (8/21/2019)  A-fib   Atrial flutter   GERD   Hypertension  Neurogenic bladder - Cazares dependent  Quadriplegia, C5-C7 complete  Percutaneouos pinning lower extremity  Colostomy with subsequent colostomy takedown     PLAN:  POD #1 - pending deep tissue and bone cultures. So far NGTD  Wound culture taken at [facility] is a Proteus ESBL. Zosyn  sensitive.   Anticipate 6 weeks of therapy for him   Wound care imperative to patients success  LTACH dispo probably a good idea..   OK for PICC  ADjsut zosyn to 4.5mg IV - extended infusion     Thank you for this consult. We will continue to follow along with you.     Greater than 30 min. Over 50% of that time was in direct face to face care and counseling.     Dr Koo

## 2020-12-23 VITALS
TEMPERATURE: 98.4 F | HEIGHT: 70 IN | BODY MASS INDEX: 28.09 KG/M2 | DIASTOLIC BLOOD PRESSURE: 51 MMHG | RESPIRATION RATE: 16 BRPM | WEIGHT: 196.21 LBS | OXYGEN SATURATION: 95 % | HEART RATE: 49 BPM | SYSTOLIC BLOOD PRESSURE: 103 MMHG

## 2020-12-23 LAB
ANION GAP SERPL CALC-SCNC: 7 MMOL/L (ref 7–16)
BACTERIA SPEC ANAEROBE CULT: NORMAL
BACTERIA SPEC ANAEROBE CULT: NORMAL
BACTERIA WND AEROBE CULT: ABNORMAL
BACTERIA WND AEROBE CULT: ABNORMAL
BASOPHILS # BLD AUTO: 0.5 % (ref 0–1.8)
BASOPHILS # BLD: 0.03 K/UL (ref 0–0.12)
BUN SERPL-MCNC: 10 MG/DL (ref 8–22)
CALCIUM SERPL-MCNC: 9.2 MG/DL (ref 8.5–10.5)
CHLORIDE SERPL-SCNC: 111 MMOL/L (ref 96–112)
CO2 SERPL-SCNC: 25 MMOL/L (ref 20–33)
CREAT SERPL-MCNC: 0.54 MG/DL (ref 0.5–1.4)
EOSINOPHIL # BLD AUTO: 0.21 K/UL (ref 0–0.51)
EOSINOPHIL NFR BLD: 3.4 % (ref 0–6.9)
ERYTHROCYTE [DISTWIDTH] IN BLOOD BY AUTOMATED COUNT: 51.3 FL (ref 35.9–50)
FOLATE SERPL-MCNC: 16 NG/ML
GLUCOSE SERPL-MCNC: 91 MG/DL (ref 65–99)
GRAM STN SPEC: ABNORMAL
HCT VFR BLD AUTO: 31.1 % (ref 42–52)
HGB BLD-MCNC: 9.9 G/DL (ref 14–18)
IMM GRANULOCYTES # BLD AUTO: 0.05 K/UL (ref 0–0.11)
IMM GRANULOCYTES NFR BLD AUTO: 0.8 % (ref 0–0.9)
LYMPHOCYTES # BLD AUTO: 1.76 K/UL (ref 1–4.8)
LYMPHOCYTES NFR BLD: 28.4 % (ref 22–41)
MCH RBC QN AUTO: 32.5 PG (ref 27–33)
MCHC RBC AUTO-ENTMCNC: 31.8 G/DL (ref 33.7–35.3)
MCV RBC AUTO: 102 FL (ref 81.4–97.8)
MONOCYTES # BLD AUTO: 0.56 K/UL (ref 0–0.85)
MONOCYTES NFR BLD AUTO: 9 % (ref 0–13.4)
NEUTROPHILS # BLD AUTO: 3.58 K/UL (ref 1.82–7.42)
NEUTROPHILS NFR BLD: 57.9 % (ref 44–72)
NRBC # BLD AUTO: 0 K/UL
NRBC BLD-RTO: 0 /100 WBC
PLATELET # BLD AUTO: 230 K/UL (ref 164–446)
PMV BLD AUTO: 8.4 FL (ref 9–12.9)
POTASSIUM SERPL-SCNC: 3.9 MMOL/L (ref 3.6–5.5)
RBC # BLD AUTO: 3.05 M/UL (ref 4.7–6.1)
SIGNIFICANT IND 70042: ABNORMAL
SIGNIFICANT IND 70042: NORMAL
SIGNIFICANT IND 70042: NORMAL
SITE SITE: ABNORMAL
SITE SITE: NORMAL
SITE SITE: NORMAL
SODIUM SERPL-SCNC: 143 MMOL/L (ref 135–145)
SOURCE SOURCE: ABNORMAL
SOURCE SOURCE: NORMAL
SOURCE SOURCE: NORMAL
T3FREE SERPL-MCNC: 2.43 PG/ML (ref 2–4.4)
VIT B12 SERPL-MCNC: 691 PG/ML (ref 211–911)
WBC # BLD AUTO: 6.2 K/UL (ref 4.8–10.8)

## 2020-12-23 PROCEDURE — 84481 FREE ASSAY (FT-3): CPT

## 2020-12-23 PROCEDURE — 700102 HCHG RX REV CODE 250 W/ 637 OVERRIDE(OP): Performed by: STUDENT IN AN ORGANIZED HEALTH CARE EDUCATION/TRAINING PROGRAM

## 2020-12-23 PROCEDURE — 700102 HCHG RX REV CODE 250 W/ 637 OVERRIDE(OP): Performed by: INTERNAL MEDICINE

## 2020-12-23 PROCEDURE — 82746 ASSAY OF FOLIC ACID SERUM: CPT

## 2020-12-23 PROCEDURE — 700111 HCHG RX REV CODE 636 W/ 250 OVERRIDE (IP): Performed by: INTERNAL MEDICINE

## 2020-12-23 PROCEDURE — 700102 HCHG RX REV CODE 250 W/ 637 OVERRIDE(OP): Performed by: FAMILY MEDICINE

## 2020-12-23 PROCEDURE — A9270 NON-COVERED ITEM OR SERVICE: HCPCS | Performed by: FAMILY MEDICINE

## 2020-12-23 PROCEDURE — 99239 HOSP IP/OBS DSCHRG MGMT >30: CPT | Performed by: FAMILY MEDICINE

## 2020-12-23 PROCEDURE — 80048 BASIC METABOLIC PNL TOTAL CA: CPT

## 2020-12-23 PROCEDURE — A9270 NON-COVERED ITEM OR SERVICE: HCPCS | Performed by: INTERNAL MEDICINE

## 2020-12-23 PROCEDURE — 700111 HCHG RX REV CODE 636 W/ 250 OVERRIDE (IP): Performed by: FAMILY MEDICINE

## 2020-12-23 PROCEDURE — A9270 NON-COVERED ITEM OR SERVICE: HCPCS | Performed by: STUDENT IN AN ORGANIZED HEALTH CARE EDUCATION/TRAINING PROGRAM

## 2020-12-23 PROCEDURE — 700105 HCHG RX REV CODE 258: Performed by: INTERNAL MEDICINE

## 2020-12-23 PROCEDURE — 82607 VITAMIN B-12: CPT

## 2020-12-23 PROCEDURE — 85025 COMPLETE CBC W/AUTO DIFF WBC: CPT

## 2020-12-23 PROCEDURE — 700105 HCHG RX REV CODE 258: Performed by: FAMILY MEDICINE

## 2020-12-23 RX ORDER — AMOXICILLIN 250 MG
2 CAPSULE ORAL 2 TIMES DAILY
Status: DISCONTINUED | OUTPATIENT
Start: 2020-12-23 | End: 2020-12-24 | Stop reason: HOSPADM

## 2020-12-23 RX ADMIN — RIVAROXABAN 20 MG: 20 TABLET, FILM COATED ORAL at 17:40

## 2020-12-23 RX ADMIN — AMLODIPINE BESYLATE 10 MG: 10 TABLET ORAL at 04:55

## 2020-12-23 RX ADMIN — DOCUSATE SODIUM 100 MG: 100 CAPSULE ORAL at 04:52

## 2020-12-23 RX ADMIN — DOCUSATE SODIUM 50 MG AND SENNOSIDES 8.6 MG 2 TABLET: 8.6; 5 TABLET, FILM COATED ORAL at 04:52

## 2020-12-23 RX ADMIN — DOCUSATE SODIUM 50 MG AND SENNOSIDES 8.6 MG 2 TABLET: 8.6; 5 TABLET, FILM COATED ORAL at 17:40

## 2020-12-23 RX ADMIN — FERROUS SULFATE TAB 325 MG (65 MG ELEMENTAL FE) 325 MG: 325 (65 FE) TAB at 04:51

## 2020-12-23 RX ADMIN — BACLOFEN 20 MG: 10 TABLET ORAL at 20:02

## 2020-12-23 RX ADMIN — BACLOFEN 20 MG: 10 TABLET ORAL at 12:52

## 2020-12-23 RX ADMIN — PIPERACILLIN AND TAZOBACTAM 4.5 G: 4; .5 INJECTION, POWDER, LYOPHILIZED, FOR SOLUTION INTRAVENOUS; PARENTERAL at 22:03

## 2020-12-23 RX ADMIN — FERROUS SULFATE TAB 325 MG (65 MG ELEMENTAL FE) 325 MG: 325 (65 FE) TAB at 17:40

## 2020-12-23 RX ADMIN — BACLOFEN 20 MG: 10 TABLET ORAL at 08:33

## 2020-12-23 RX ADMIN — PIPERACILLIN AND TAZOBACTAM 4.5 G: 4; .5 INJECTION, POWDER, LYOPHILIZED, FOR SOLUTION INTRAVENOUS; PARENTERAL at 04:51

## 2020-12-23 RX ADMIN — PIPERACILLIN AND TAZOBACTAM 4.5 G: 4; .5 INJECTION, POWDER, LYOPHILIZED, FOR SOLUTION INTRAVENOUS; PARENTERAL at 12:53

## 2020-12-23 RX ADMIN — DOCUSATE SODIUM 50 MG AND SENNOSIDES 8.6 MG 2 TABLET: 8.6; 5 TABLET, FILM COATED ORAL at 09:37

## 2020-12-23 ASSESSMENT — ENCOUNTER SYMPTOMS
SPUTUM PRODUCTION: 0
FOCAL WEAKNESS: 1
BLURRED VISION: 0
PALPITATIONS: 0
NECK PAIN: 0
CHILLS: 0
WEAKNESS: 1
FEVER: 0
HEARTBURN: 0
SORE THROAT: 0
SENSORY CHANGE: 1
SHORTNESS OF BREATH: 0
NERVOUS/ANXIOUS: 0
DIZZINESS: 0
HEADACHES: 0
COUGH: 0
NAUSEA: 0
MYALGIAS: 0
ABDOMINAL PAIN: 0
FLANK PAIN: 0
WHEEZING: 0
VOMITING: 0
BACK PAIN: 0
DIARRHEA: 0

## 2020-12-23 ASSESSMENT — PAIN DESCRIPTION - PAIN TYPE
TYPE: ACUTE PAIN

## 2020-12-23 NOTE — DISCHARGE PLANNING
Anticipated Discharge Disposition:   LTACH vs IRF    Action:   This RN CM is following the case. Informed Augustine Saravia, Clinical Admissions Coordinator at [facility] of the Physiatry referral.    Per Shin, he will follow up on the case.    Barriers to Discharge:   Pending Medical Clearance  Pending placement acceptance    Plan:   Will continue to assist Pt with discharge as needed.     Addendum:     Per Dr Shafer in Rounds today, Priority is LTACH as discharge disposition. Spoke with Pt who agreed to send the referral to [facility]. Choice was faxed to [facility].  Requested Jagruti Sparrow to look into the case.

## 2020-12-23 NOTE — DISCHARGE PLANNING
Anticipated Discharge Disposition:   [facility]    Action:   Pt has been accepted at [facility]. [facility] has been set up at 20:00 . This RN CM informed ZAIRA Herrera of this update.   [facility]/transfer packet prepared.    This RN CM left a VM to Mack Skinner, Pt's daughter of this update.    Barriers to Discharge:   None    Plan:   Will continue to assist Pt with discharge as needed.

## 2020-12-23 NOTE — PROGRESS NOTES
[facility] Medicine Daily Progress Note    Date of Service  12/23/2020    Chief Complaint  70 y.o. male admitted 12/19/2020 with Infected decubitus ulcer    Hospital Course  Admitted as a transfer from [facility] secondary to infected decubitus ulcer.  Patient with known history of incomplete quadriplegia, chronic sacral decubitus ulcer, previous flap surgery by plastic surgery.  CT scan of the pelvis showed decubitus ulcer overlying the lower sacrum with multiple skin defects and probable associated sacral osteomyelitis.  Wound culture showed ESBL Proteus.  Plastic surgery was consulted on the case and requested transfer to [facility].  He underwent Excisional debridement of sacral pressure ulcer including skin, subcutaneous tissue, muscle and fascia with sacral bone biopsy on 12/20/2020.  Infectious disease was consulted on the case, he is currently on IV Zosyn. PICC line was placed on 12/21/2020    Interval Problem Update  Decubitus ulcer - culture showed ESBL Proteus, deep tissue and bone culture pending, ID prefers LTAC placement  HTN - sbp 100-120    Lengthy discussion with patient, updates given, plan of care discussed.  He is agreeable to LTAC as being first option, he is undecided on Rehab versus SNF as second option.    Consultants/Specialty  ID  Plastic Surgery    Code Status  Full Code    Disposition  LTAC vs Rehab vs SNF    Review of Systems  Review of Systems   Constitutional: Negative for chills, fever and malaise/fatigue.   HENT: Negative for congestion, hearing loss and sore throat.    Eyes: Negative for blurred vision.   Respiratory: Negative for cough, shortness of breath and wheezing.    Cardiovascular: Negative for chest pain, palpitations and leg swelling.   Gastrointestinal: Negative for abdominal pain, diarrhea, heartburn, nausea and vomiting.   Genitourinary: Negative for dysuria, flank pain and hematuria.   Musculoskeletal: Negative for back pain, joint pain, myalgias and neck  pain.   Skin: Negative for rash.   Neurological: Positive for sensory change, focal weakness and weakness. Negative for dizziness and headaches.   Psychiatric/Behavioral: The patient is not nervous/anxious.         Physical Exam  Temp:  [36.3 °C (97.4 °F)-37.3 °C (99.1 °F)] 36.4 °C (97.5 °F)  Pulse:  [47-54] 47  Resp:  [14-16] 16  BP: (102-126)/(57-67) 109/67  SpO2:  [95 %-99 %] 97 %    Physical Exam  Vitals signs and nursing note reviewed.   HENT:      Head: Normocephalic and atraumatic.      Mouth/Throat:      Mouth: Mucous membranes are moist.   Eyes:      Extraocular Movements: Extraocular movements intact.      Conjunctiva/sclera: Conjunctivae normal.      Pupils: Pupils are equal, round, and reactive to light.   Cardiovascular:      Rate and Rhythm: Normal rate and regular rhythm.   Pulmonary:      Effort: Pulmonary effort is normal.      Breath sounds: Normal breath sounds. No wheezing.   Abdominal:      General: Bowel sounds are normal. There is no distension.      Palpations: Abdomen is soft.      Tenderness: There is no abdominal tenderness. There is no guarding or rebound.      Comments: ostomy   Musculoskeletal:      Right lower leg: No edema.      Left lower leg: No edema.   Skin:     General: Skin is warm and dry.      Comments: Sacral decubitus ulcer (wound images reviewed in epic)   Neurological:      Mental Status: He is alert and oriented to person, place, and time.      Cranial Nerves: No cranial nerve deficit.      Motor: Weakness (MMT BUE 2-3/5, BLE 0/5) present.         Fluids    Intake/Output Summary (Last 24 hours) at 12/23/2020 0931  Last data filed at 12/23/2020 0320  Gross per 24 hour   Intake 900 ml   Output 2525 ml   Net -1625 ml       Laboratory  Recent Labs     12/21/20  0605 12/22/20  0525 12/23/20  0455   WBC 7.5 7.0 6.2   RBC 3.13* 3.30* 3.05*   HEMOGLOBIN 10.0* 10.5* 9.9*   HEMATOCRIT 32.4* 32.8* 31.1*   .5* 99.4* 102.0*   MCH 31.9 31.8 32.5   MCHC 30.9* 32.0* 31.8*   RDW  53.3* 50.6* 51.3*   PLATELETCT 218 241 230   MPV 8.5* 8.4* 8.4*     Recent Labs     12/21/20  0605 12/22/20  0525 12/23/20  0455   SODIUM 140 140 143   POTASSIUM 3.7 3.9 3.9   CHLORIDE 111 109 111   CO2 26 24 25   GLUCOSE 96 94 91   BUN 11 9 10   CREATININE 0.64 0.56 0.54   CALCIUM 8.7 8.9 9.2                   Imaging  CT abd/pelvis:   Findings consistent with decubitus ulcer overlying the lower sacrum with multiple skin defects and probable associated sacral osteomyelitis.  Catheter fragment appears to be present in the soft tissues overlying the sacrum and extending into the LEFT   gluteal region.          Assessment/Plan  * Decubitus ulcer- (present on admission)  Assessment & Plan  Excisional debridement of sacral pressure ulcer including skin, subcutaneous tissue, muscle and fascia with sacral bone   Biopsy.12/20/2020  Pain control  Wound care  IV Zosyn  Follow cultures  PICC line placement 12/21/2020    Macrocytosis- (present on admission)  Assessment & Plan  b12 and folate  wnl    Low TSH level- (present on admission)  Assessment & Plan  FT4 and FT3 wnl    Anemia- (present on admission)  Assessment & Plan  Follow cbc    Neurogenic bladder- (present on admission)  Assessment & Plan  Cazares catheter (changed 12/16/2020)  Follow-up with Urology as outpatient      Chronic incomplete quadriplegia (HCC)- (present on admission)  Assessment & Plan  PT and OT    Essential hypertension- (present on admission)  Assessment & Plan  Losartan  Hold Amlodipine    Paroxysmal atrial flutter (HCC)- (present on admission)  Assessment & Plan  Xarelto  Hold Metoprolol due to bradycardia       VTE prophylaxis: Xarelto

## 2020-12-23 NOTE — PROGRESS NOTES
Infectious Disease Progress Note    Author: Damian Koo M.D. Date & Time created: 12/23/2020  2:34 PM    Interval History:  POD #3 - I&D sacral wound with bone biopsy and deep tissue culture.    Abx: Zosyn: day #5, effective abx day #3    Previous: Vanco    12/18: Wound culture: Proteus: ESBL. Sensitive to zosyn  12/20: Surgical wounds NGTD on bone and deep tissue.  12/21: No fevers. Did well overnight. No acute issues.   12/22: Seen by [facility].  Pt prefers to go back to [facility].  12/23: No acute issues. Desires to go to [facility]. No fevers.     Labs Reviewed, Medications Reviewed, Radiology Reviewed, Wound Reviewed, Fluids Reviewed and GI Nutrition Reviewed    Review of Systems:  Review of Systems   Constitutional: Negative for chills and fever.   Respiratory: Negative for cough and sputum production.    Cardiovascular: Negative for chest pain.   Gastrointestinal: Negative for abdominal pain, nausea and vomiting.   Skin: Negative for rash.       Physical Exam:  Physical Exam  Cardiovascular:      Rate and Rhythm: Normal rate and regular rhythm.   Pulmonary:      Effort: Pulmonary effort is normal.      Breath sounds: Normal breath sounds.   Abdominal:      General: Abdomen is flat. Bowel sounds are normal.   Skin:     Comments: Wound vac   Neurological:      Mental Status: He is alert.      Comments: Quadraplegia   Psychiatric:         Mood and Affect: Mood normal.         Behavior: Behavior normal.         Labs:  Recent Results (from the past 24 hour(s))   T3 FREE    Collection Time: 12/23/20  4:55 AM   Result Value Ref Range    T3,Free 2.43 2.00 - 4.40 pg/mL   Basic Metabolic Panel    Collection Time: 12/23/20  4:55 AM   Result Value Ref Range    Sodium 143 135 - 145 mmol/L    Potassium 3.9 3.6 - 5.5 mmol/L    Chloride 111 96 - 112 mmol/L    Co2 25 20 - 33 mmol/L    Glucose 91 65 - 99 mg/dL    Bun 10 8 - 22 mg/dL    Creatinine 0.54 0.50 - 1.40 mg/dL    Calcium 9.2 8.5 - 10.5 mg/dL    Anion Gap 7.0  7.0 - 16.0   CBC WITH DIFFERENTIAL    Collection Time: 12/23/20  4:55 AM   Result Value Ref Range    WBC 6.2 4.8 - 10.8 K/uL    RBC 3.05 (L) 4.70 - 6.10 M/uL    Hemoglobin 9.9 (L) 14.0 - 18.0 g/dL    Hematocrit 31.1 (L) 42.0 - 52.0 %    .0 (H) 81.4 - 97.8 fL    MCH 32.5 27.0 - 33.0 pg    MCHC 31.8 (L) 33.7 - 35.3 g/dL    RDW 51.3 (H) 35.9 - 50.0 fL    Platelet Count 230 164 - 446 K/uL    MPV 8.4 (L) 9.0 - 12.9 fL    Neutrophils-Polys 57.90 44.00 - 72.00 %    Lymphocytes 28.40 22.00 - 41.00 %    Monocytes 9.00 0.00 - 13.40 %    Eosinophils 3.40 0.00 - 6.90 %    Basophils 0.50 0.00 - 1.80 %    Immature Granulocytes 0.80 0.00 - 0.90 %    Nucleated RBC 0.00 /100 WBC    Neutrophils (Absolute) 3.58 1.82 - 7.42 K/uL    Lymphs (Absolute) 1.76 1.00 - 4.80 K/uL    Monos (Absolute) 0.56 0.00 - 0.85 K/uL    Eos (Absolute) 0.21 0.00 - 0.51 K/uL    Baso (Absolute) 0.03 0.00 - 0.12 K/uL    Immature Granulocytes (abs) 0.05 0.00 - 0.11 K/uL    NRBC (Absolute) 0.00 K/uL   FOLATE    Collection Time: 12/23/20  4:55 AM   Result Value Ref Range    Folate -Folic Acid 16.0 >4.0 ng/mL   VITAMIN B12    Collection Time: 12/23/20  4:55 AM   Result Value Ref Range    Vitamin B12 -True Cobalamin 691 211 - 911 pg/mL   ESTIMATED GFR    Collection Time: 12/23/20  4:55 AM   Result Value Ref Range    GFR If African American >60 >60 mL/min/1.73 m 2    GFR If Non African American >60 >60 mL/min/1.73 m 2     Results     Procedure Component Value Units Date/Time    CULTURE WOUND W/ GRAM STAIN [[id number]]  (Abnormal) Collected: 12/20/20 1601    Order Status: Completed Specimen: Wound Updated: 12/23/20 1425     Significant Indicator POS     Source WND     Site SACRAL BONE     Culture Result Growth noted after further incubation, see below for  organism identification.       Gram Stain Result Rare WBCs.  No organisms seen.       Culture Result Coagulase-negative Staphylococcus species  Isolated from enrichment broth only, please correlate  with  clinical condition.      Narrative:      Surgery - swabs received    Anaerobic Culture [[id number]] Collected: 20 160    Order Status: Completed Specimen: Wound Updated: 20 1425     Significant Indicator NEG     Source WND     Site SACRAL BONE     Culture Result No Anaerobes isolated.    Narrative:      Surgery - swabs received    CULTURE WOUND W/ GRAM STAIN [[id number]]  (Abnormal) Collected: 20 1605    Order Status: Completed Specimen: Wound Updated: 20 1420     Significant Indicator POS     Source WND     Site SACRAL DEEP WOUND     Culture Result Growth noted after further incubation, see below for  organism identification.       Gram Stain Result Few WBCs.  No organisms seen.       Culture Result Coagulase-negative Staphylococcus species  Isolated from enrichment broth only, please correlate with  clinical condition.      Narrative:      Surgery - swabs received    Anaerobic Culture [[id number]] Collected: 20    Order Status: Completed Specimen: Wound Updated: 20 1420     Significant Indicator NEG     Source WND     Site SACRAL DEEP WOUND     Culture Result No Anaerobes isolated.    Narrative:      Surgery - swabs received    GRAM STAIN [[id number]] Collected: 20    Order Status: Completed Specimen: Wound Updated: 20 1256     Significant Indicator .     Source WND     Site SACRAL BONE     Gram Stain Result Rare WBCs.  No organisms seen.      Narrative:      Surgery - swabs received    GRAM STAIN [[id number]] Collected: 20    Order Status: Completed Specimen: Wound Updated: 20 1256     Significant Indicator .     Source WND     Site SACRAL DEEP WOUND     Gram Stain Result Few WBCs.  No organisms seen.      Narrative:      Surgery - swabs received        Hemodynamics:  Temp (24hrs), Av.7 °C (98 °F), Min:36.3 °C (97.4 °F), Max:37.3 °C (99.1 °F)  Temperature: 36.6 °C (97.9 °F)  Pulse  Av.7  Min: 46  Max: 87   Blood Pressure : (!)  98/59     Peripheral IV 20 G Right Wrist (Active)   Site Assessment Clean;Dry;Intact 12/22/20 2000   Dressing Type Transparent 12/22/20 2000   Line Status Scrubbed the hub prior to access;Flushed;Saline locked 12/22/20 2000   Dressing Status Clean;Dry;Intact 12/22/20 2000   Dressing Intervention N/A 12/22/20 2000   Dressing Change Due 12/26/20 12/21/20 1000   Date Primary Tubing Changed 12/20/20 12/21/20 1000   Date Secondary Tubing Changed 12/21/20 12/21/20 1000   NEXT Primary Tubing Change  12/22/20 12/21/20 1000   NEXT Secondary Tubing Change  12/22/20 12/21/20 1000   Infiltration Grading ([facility]) 0 12/22/20 2000   Phlebitis Scale ([facility] Only) 0 12/22/20 2000       PICC Single Lumen 12/21/20 Right Basilic (Active)   Site Assessment Clean;Dry;Intact 12/22/20 2000   Line Status Blood return noted;Flushed;Infusing 12/22/20 2000   Line Secured at (cm) 0 cm 12/21/20 1515   Extremity Circumference (cm) 32 cm 12/21/20 1515   Dressing Type Biopatch;Occlusive;Primapore;Securing device;Skin barrier;Transparent 12/22/20 2000   Dressing Status Clean;Dry;Intact 12/22/20 2000   Dressing Intervention N/A 12/22/20 2000   Dressing Change Due 12/26/20 12/22/20 0745   Date Primary Tubing Changed 12/21/20 12/22/20 0745   Date Secondary Tubing Changed 12/22/20 12/22/20 2000   Date IV Connector(s) Changed 12/21/20 12/22/20 0745   NEXT Primary Tubing Change  12/26/20 12/22/20 2000   NEXT Secondary Tubing Change  12/23/20 12/22/20 2000   NEXT IV Connector(s) Change 12/26/20 12/22/20 2000   Line Necessity Assessed Antibiotic Therapy Greater than 7 Days 12/22/20 2000   $ Single Lumen PICC Charge Single kit used 12/21/20 1515     Wound:  @WOUNDLDA(4)@     Fluids:  Intake/Output       12/21/20 0700 - 12/22/20 0659 12/22/20 0700 - 12/23/20 0659 12/23/20 0700 - 12/24/20 0659      0700-1859 9022-4132 Total 2023-5518 9240-4308 Total 5793-2469 9497-3353 Total       Intake    P.O.  --  360 360  800  -- 800  --  -- --    P.O. -- 360 360 800  -- 800 -- -- --    IV Piggyback  --  -- --  --  100 100  --  -- --    Volume (mL) (piperacillin-tazobactam (ZOSYN) 4.5 g in  mL IVPB) -- -- -- -- 100 100 -- -- --    Total Intake -- 360 360 800 100 900 -- -- --       Output    Urine  600  1650 2250  1550  1125 2675  --  -- --    Output (mL) (Urethral Catheter) 600 1650 2250 1550 1125 2675 -- -- --    Drains  --  0 0  --  150 150  --  -- --    Output (mL) (Negative Pressure Wound Therapy 12/21/20 Surgical Sacrum Left) -- 0 0 -- 150 150 -- -- --    Stool  --  -- --  --  50 50  --  -- --    Measurable Stool (mL) -- -- -- -- 50 50 -- -- --    Total Output 600 1650 2250 1550 1325 2875 -- -- --       Net I/O     -600 -1290 -1890 -750 -1225 -1975 -- -- --           GI/Nutrition:  Orders Placed This Encounter   Procedures   • Diet Order Diet: Regular     Standing Status:   Standing     Number of Occurrences:   1     Order Specific Question:   Diet:     Answer:   Regular [1]     Medications:  Current Facility-Administered Medications   Medication Last Admin   • senna-docusate (PERICOLACE or SENOKOT S) 8.6-50 MG per tablet 2 Tab 2 Tab at 12/23/20 0937   • piperacillin-tazobactam (ZOSYN) 4.5 g in  mL IVPB 4.5 g at 12/23/20 1253   • rivaroxaban (XARELTO) tablet 20 mg 20 mg at 12/22/20 1744   • ondansetron (ZOFRAN ODT) dispertab 4 mg     • ondansetron (ZOFRAN) syringe/vial injection 4 mg     • polyethylene glycol/lytes (MIRALAX) PACKET 1 Packet 1 Packet at 12/22/20 1744   • acetaminophen (Tylenol) tablet 650 mg 650 mg at 12/19/20 1850   • Pharmacy Consult Request ...Pain Management Review 1 Each      And   • oxyCODONE immediate-release (ROXICODONE) tablet 5 mg      And   • oxyCODONE immediate release (ROXICODONE) tablet 10 mg      And   • morphine (pf) 4 mg/mL injection 4 mg     • baclofen (LIORESAL) tablet 20 mg 20 mg at 12/23/20 1252   • ferrous sulfate tablet 325 mg 325 mg at 12/23/20 0451   • fluticasone (FLONASE) nasal spray 100 mcg     • losartan (COZAAR)  tablet 50 mg 50 mg at 12/22/20 2151   • traZODone (DESYREL) tablet 50 mg 50 mg at 12/22/20 2151     Medical Decision Making, by Problem:  Active Hospital Problems    Diagnosis   • *Decubitus ulcer [L89.90]   • Paroxysmal atrial flutter (HCC) [I48.92]   • Essential hypertension [I10]   • Neurogenic bladder [N31.9]     Decubitus ulcer and sacral osteomyelitis  Ulcer debridement with flap closure (8/21/2019)  A-fib   Atrial flutter   GERD   Hypertension  Neurogenic bladder - Cazares dependent  Quadriplegia, C5-C7 complete  Percutaneouos pinning lower extremity  Colostomy with subsequent colostomy takedown     PLAN:  POD #3 - pending deep tissue and bone cultures. So far NGTD  Wound culture taken at [facility] is a Proteus ESBL. Zosyn sensitive.   Anticipate 6 weeks of therapy for him - Target date 1/31/21  Wound care imperative to patients success  Pending [facility] dispo  Adjusted Zosyn to 4.5mg IV - extended infusion  No abx changes today.    Discussed with pt.    Greater than 30 min. Over 50% of that time was in direct face to face care and counseling.    Dr Koo

## 2020-12-23 NOTE — PROGRESS NOTES
Report received from RN, assumed care at 0700  Pt is A0X4, and responds appropriately   Pt declines any SOB, chest pain, new onset of numbness/ tingiling  Pt rates pain at 0/10, on a scale of 1-10, pt declines pain and pain medication needs at this time   Pt has hutchins in place, urine is clear and without sedimentation, STAT lock is in place   Pt has + flatus, + bowel sounds, + BM through ostomy   Pt is a paraplegic, x2 max assist for slide transfer   Pt is tolerating a regular diet, pt denies any nausea/vomiting  Pt has LLQ ostomy in place ,applaice is intact, no leaks noted, + output   Pt has wound vac to sacral wound, vac is intact, no leaks noted at this time   Pt has BLE wounds, dressings in place are clean, dry and intact     Plan of care discussed, all questions answered. Explained importance of calling before getting OOB and pt verbalizes understanding. Explained importance of oral care. Call light is within reach, treaded slipper socks on, bed in lowest/ locked position, hourly rounding in place, all needs met at this time

## 2020-12-23 NOTE — DISCHARGE PLANNING
Received Choice form at [id number]  Agency/Facility Name: [facility]  Referral sent per Choice form @ [phone]

## 2020-12-23 NOTE — DISCHARGE PLANNING
Received Transport Form @ 1442  Spoke to Danuta @ [facility]    Transport is scheduled for 12/23 @1600 going to [address] ([facility]).   KALANI Herrera notified  CHANDU Jones notified

## 2020-12-23 NOTE — DISCHARGE PLANNING
Agency/Facility Name: [facility]  Spoke To: Danuta  Outcome: Pt. Transport time moved to 2000.   KALANI Herrera notified

## 2020-12-23 NOTE — PROGRESS NOTES
Assessment complete.  A&O x 4. Patient calls appropriately.  Patient chairfast baseline, max assist. q2 turns in place. Bed alarm off.   Patient has 0/10 pain. Pain managed with prescribed medications.  Denies N&V. Tolerating reg diet.  WV to saccrum, no leaks detected BLE wound dressings CDI.  + void into hutchins, + flatus, + BM via ostomy.  Patient denies SOB.  SCD's off. Heel float boots in place.    Review plan with of care with patient. Call light and personal belongings with in reach. Hourly rounding in place. All needs met at this time.

## 2020-12-23 NOTE — DISCHARGE SUMMARY
Discharge Summary    CHIEF COMPLAINT ON ADMISSION  No chief complaint on file.      Reason for Admission  Decubitus Ulcer     CODE STATUS  Full Code    HPI & HOSPITAL COURSE  This is a 70 y.o. male here admitted as a transfer from [facility] secondary to infected decubitus ulcer. Patient with known history of incomplete quadriplegia, chronic sacral decubitus ulcer, previous flap surgery by plastic surgery.  CT scan of the pelvis showed decubitus ulcer overlying the lower sacrum with multiple skin defects and probable associated sacral osteomyelitis.  Wound culture showed ESBL Proteus. Plastic surgery was consulted on the case and requested transfer to [facility]. He underwent Excisional debridement of sacral pressure ulcer including skin, subcutaneous tissue, muscle and fascia with sacral bone biopsy on 12/20/2020.  Infectious disease was consulted on the case, he is currently on IV Zosyn. PICC line was placed on 12/21/2020.  Sacral bone culture done under surgery is negative so far, deep wound tissue culture still pending.  ID has recommended IV Zosyn for 6 weeks for now.  He will need continued wound care.    Therefore, he is discharged in good and stable condition to a long-term acute care hospital.    The patient met 2-midnight criteria for an inpatient stay at the time of discharge.      FOLLOW UP ITEMS POST DISCHARGE  Follow up with ID    DISCHARGE DIAGNOSES  Principal Problem:    Decubitus ulcer POA: Yes  Active Problems:    Paroxysmal atrial flutter (HCC) POA: Yes    Essential hypertension POA: Yes    Chronic incomplete quadriplegia (HCC) POA: Yes    Neurogenic bladder POA: Yes    Anemia POA: Yes    Low TSH level POA: Yes    Macrocytosis POA: Yes  Resolved Problems:    * No resolved hospital problems. *      FOLLOW UP  No future appointments.  No follow-up provider specified.    MEDICATIONS ON DISCHARGE     Medication List      START taking these medications      Instructions   NS SOLN 100 mL with  piperacillin-tazobactam 4.5 (4-0.5) g SOLR 4.5 g   Infuse 4.5 g into a venous catheter every 8 hours for 40 days.  Dose: 4.5 g        CHANGE how you take these medications      Instructions   rivaroxaban 20 MG Tabs tablet  What changed:   · medication strength  · how much to take  · when to take this  Commonly known as: XARELTO   Take 1 Tab by mouth every day at 6 PM.  Dose: 20 mg        CONTINUE taking these medications      Instructions   acetaminophen 500 MG Tabs  Commonly known as: TYLENOL   Take 500-1,000 mg by mouth every four hours as needed. Takes 1000 mg twice daily then 500 mg every 4 hours as needed (3grams per 24 hours)  Dose: 500-1,000 mg     ascorbic acid 500 MG Tabs  Commonly known as: ascorbic acid   Take 500 mg by mouth 2 (two) times a day.  Dose: 500 mg     baclofen 20 MG tablet  Commonly known as: LIORESAL   Take 20 mg by mouth 4 times a day.  Dose: 20 mg     docusate sodium 100 MG Caps  Commonly known as: COLACE   Take 200 mg by mouth 2 times a day.  Dose: 200 mg     ferrous sulfate 325 (65 Fe) MG tablet   Take 325 mg by mouth 2 Times a Day.  Dose: 325 mg     fluticasone 50 MCG/ACT nasal spray  Commonly known as: FLONASE   Administer 2 Sprays into affected nostril(S) 1 time a day as needed.  Dose: 2 Spray     losartan 50 MG Tabs  Commonly known as: COZAAR   Take 50 mg by mouth every day.  Dose: 50 mg     magnesium oxide 400 MG Tabs tablet  Commonly known as: MAG-OX   Take 400 mg by mouth every day.  Dose: 400 mg     polyethylene glycol/lytes 17 g Pack  Commonly known as: MIRALAX   Take 17 g by mouth every day.  Dose: 17 g     PROBIOTIC PO   Take 1 Tab by mouth every day.  Dose: 1 Tab     sennosides 8.6 MG Tabs  Commonly known as: SENOKOT   Take 17.2 mg by mouth 2 times a day.  Dose: 17.2 mg     therapeutic multivitamin-minerals Tabs   Take 1 Tab by mouth every day.  Dose: 1 Tab     traZODone 50 MG Tabs  Commonly known as: DESYREL   Take 50 mg by mouth at bedtime as needed.  Dose: 50 mg      vitamin D 2000 UNIT Tabs   Take 2,000 Units by mouth every day.  Dose: 2,000 Units        STOP taking these medications    amLODIPine 5 MG Tabs  Commonly known as: NORVASC     dakins 0.125% (1/4 strength) 0.125 % Soln     metoprolol 25 MG Tabs  Commonly known as: LOPRESSOR     Non Formulary Request            Allergies  Allergies   Allergen Reactions   • Sulfa Drugs Rash     Rash         DIET  Orders Placed This Encounter   Procedures   • Diet Order Diet: Regular     Standing Status:   Standing     Number of Occurrences:   1     Order Specific Question:   Diet:     Answer:   Regular [1]       ACTIVITY  As tolerated.  NWB secondary to incomplete quadriplegia    LINES, DRAINS, AND WOUNDS  This is an automated list. Peripheral IVs will be removed prior to discharge.  Peripheral IV 20 G Right Wrist (Active)   Site Assessment Clean;Dry;Intact 12/22/20 2000   Dressing Type Transparent 12/22/20 2000   Line Status Scrubbed the hub prior to access;Flushed;Saline locked 12/22/20 2000   Dressing Status Clean;Dry;Intact 12/22/20 2000   Dressing Intervention N/A 12/22/20 2000   Dressing Change Due 12/26/20 12/21/20 1000   Date Primary Tubing Changed 12/20/20 12/21/20 1000   Date Secondary Tubing Changed 12/21/20 12/21/20 1000   NEXT Primary Tubing Change  12/22/20 12/21/20 1000   NEXT Secondary Tubing Change  12/22/20 12/21/20 1000   Infiltration Grading ([facility]) 0 12/22/20 2000   Phlebitis Scale ([facility] Only) 0 12/22/20 2000       PICC Single Lumen 12/21/20 Right Basilic (Active)   Site Assessment Clean;Dry;Intact 12/22/20 2000   Line Status Blood return noted;Flushed;Infusing 12/22/20 2000   Line Secured at (cm) 0 cm 12/21/20 1515   Extremity Circumference (cm) 32 cm 12/21/20 1515   Dressing Type Biopatch;Occlusive;Primapore;Securing device;Skin barrier;Transparent 12/22/20 2000   Dressing Status Clean;Dry;Intact 12/22/20 2000   Dressing Intervention N/A 12/22/20 2000   Dressing Change Due 12/26/20 12/22/20 0745   Date  Primary Tubing Changed 12/21/20 12/22/20 0745   Date Secondary Tubing Changed 12/22/20 12/22/20 2000   Date IV Connector(s) Changed 12/21/20 12/22/20 0745   NEXT Primary Tubing Change  12/26/20 12/22/20 2000   NEXT Secondary Tubing Change  12/23/20 12/22/20 2000   NEXT IV Connector(s) Change 12/26/20 12/22/20 2000   Line Necessity Assessed Antibiotic Therapy Greater than 7 Days 12/22/20 2000   $ Single Lumen PICC Charge Single kit used 12/21/20 1515     Urethral Catheter (Active)   Site Assessment Clean;Skin intact 12/23/20 0851   Collection Container Standard drainage bag 12/23/20 0851   Urinary Catheter Care Tamper Evident Seal in Place;Drainage Tube Extended;Drainage Bag Below Bladder Level and Not on Floor;Cath Care Done with Soap & Water;Drainage Tubing Properly Secured 12/23/20 0851   Securement Method Securing device (Describe) 12/23/20 0851   Output (mL) 300 mL 12/23/20 0320      Wound 12/18/20 Pressure Injury Heel Posterior Left POA (Active)   Wound Image     12/18/20 2249   Site Assessment KEN 12/23/20 0851   Periwound Assessment KEN 12/23/20 0851   Margins KEN 12/23/20 0851   Closure KEN 12/22/20 2000   Drainage Amount None 12/22/20 2000   Drainage Description Serosanguineous 12/21/20 2139   Treatments Offloading 12/19/20 0900   Wound Cleansing Normal Saline Irrigation 12/21/20 2139   Periwound Protectant Not Applicable 12/21/20 2139   Dressing Options Mepilex Heel 12/23/20 0851   Dressing Changed Observed 12/23/20 0851   Dressing Status Clean;Dry;Intact 12/23/20 0851   Dressing Change/Treatment Frequency Every 48 hrs, and As Needed 12/23/20 0851   NEXT Dressing Change/Treatment Date 12/24/20 12/23/20 0851   NEXT Weekly Photo (Inpatient Only) 12/19/20 12/19/20 0900   Wound Length (cm) 5 cm 12/19/20 0900   Wound Width (cm) 3 cm 12/19/20 0900   Wound Depth (cm) 0 cm 12/19/20 0900   Wound Surface Area (cm^2) 15 cm^2 12/19/20 0900   Wound Volume (cm^3) 0 cm^3 12/19/20 0900   Wound Healing % 100 12/19/20 0900    Shape irregular 12/19/20 0900   Wound Odor Mild 12/19/20 0900   Exposed Structures None 12/19/20 0900       Wound 12/18/20 Partial Thickness Wound Anterior;Lower Bilateral (Active)   Wound Image   12/18/20 2235   Site Assessment KEN 12/23/20 0851   Periwound Assessment KEN 12/23/20 0851   Margins KEN 12/23/20 0851   Closure KEN 12/22/20 2000   Drainage Amount None 12/22/20 2000   Drainage Description Serosanguineous 12/21/20 2139   Treatments Site care 12/19/20 0900   Wound Cleansing Approved Wound Cleanser 12/21/20 2139   Periwound Protectant Not Applicable 12/23/20 0851   Dressing Cleansing/Solutions Not Applicable 12/23/20 0851   Dressing Options Hydrofiber Silver 12/23/20 0851   Dressing Changed Observed 12/23/20 0851   Dressing Status Clean;Dry;Intact 12/23/20 0851   Dressing Change/Treatment Frequency Every 48 hrs, and As Needed 12/23/20 0851   NEXT Dressing Change/Treatment Date 12/24/20 12/23/20 0851   NEXT Weekly Photo (Inpatient Only) 12/26/20 12/19/20 0900   Non-staged Wound Description Partial thickness 12/19/20 0900   Wound Bed Granulation (%) 0 % 12/19/20 0900   Tunneling (cm) 0 cm 12/19/20 0900   Shape irregular; varying in size 12/19/20 0900   Wound Odor None 12/19/20 0900   Exposed Structures None 12/19/20 0900       Wound 12/20/20 Incision Sacrum (Active)   Wound Image    12/21/20 1400   Site Assessment KEN 12/23/20 0851   Periwound Assessment KNE 12/23/20 0851   Margins KEN 12/23/20 0851   Closure Secondary intention 12/22/20 2000   Drainage Amount Small 12/22/20 2000   Drainage Description Serosanguineous 12/22/20 2000   Treatments Cleansed;Irrigation;Site care 12/21/20 1400   Wound Cleansing Approved Wound Cleanser 12/21/20 1400   Periwound Protectant Skin Protectant Wipes to Periwound;Drape 12/21/20 1400   Dressing Cleansing/Solutions Not Applicable 12/23/20 0851   Dressing Options Wound Vac 12/23/20 0851   Dressing Changed Observed 12/23/20 0851   Dressing Status Clean;Dry;Intact 12/23/20  0851   Dressing Change/Treatment Frequency By Wound Team Only 12/23/20 0851   NEXT Dressing Change/Treatment Date 12/24/20 12/23/20 0851   NEXT Weekly Photo (Inpatient Only) 12/28/20 12/21/20 2139   Wound Length (cm) 3.5 cm 12/21/20 1400   Wound Width (cm) 5.5 cm 12/21/20 1400   Wound Depth (cm) 3.5 cm 12/21/20 1400   Wound Surface Area (cm^2) 19.25 cm^2 12/21/20 1400   Wound Volume (cm^3) 67.38 cm^3 12/21/20 1400   Undermining of Wound, 1st Location From 10 o'clock;To 5 o'clock 12/21/20 1400   Undermining (cm) - 2nd location 9 cm 12/21/20 1400   Shape Cavernous  12/21/20 1400   Wound Odor None 12/21/20 1400   Exposed Structures Bone 12/21/20 1400      PICC Single Lumen 12/21/20 Right Basilic (Active)   Site Assessment Clean;Dry;Intact 12/22/20 2000   Line Status Blood return noted;Flushed;Infusing 12/22/20 2000   Line Secured at (cm) 0 cm 12/21/20 1515   Extremity Circumference (cm) 32 cm 12/21/20 1515   Dressing Type Biopatch;Occlusive;Primapore;Securing device;Skin barrier;Transparent 12/22/20 2000   Dressing Status Clean;Dry;Intact 12/22/20 2000   Dressing Intervention N/A 12/22/20 2000   Dressing Change Due 12/26/20 12/22/20 0745   Date Primary Tubing Changed 12/21/20 12/22/20 0745   Date Secondary Tubing Changed 12/22/20 12/22/20 2000   Date IV Connector(s) Changed 12/21/20 12/22/20 0745   NEXT Primary Tubing Change  12/26/20 12/22/20 2000   NEXT Secondary Tubing Change  12/23/20 12/22/20 2000   NEXT IV Connector(s) Change 12/26/20 12/22/20 2000   Line Necessity Assessed Antibiotic Therapy Greater than 7 Days 12/22/20 2000   $ Single Lumen PICC Charge Single kit used 12/21/20 1515     Peripheral IV 20 G Right Wrist (Active)   Site Assessment Clean;Dry;Intact 12/22/20 2000   Dressing Type Transparent 12/22/20 2000   Line Status Scrubbed the hub prior to access;Flushed;Saline locked 12/22/20 2000   Dressing Status Clean;Dry;Intact 12/22/20 2000   Dressing Intervention N/A 12/22/20 2000   Dressing Change Due  12/26/20 12/21/20 1000   Date Primary Tubing Changed 12/20/20 12/21/20 1000   Date Secondary Tubing Changed 12/21/20 12/21/20 1000   NEXT Primary Tubing Change  12/22/20 12/21/20 1000   NEXT Secondary Tubing Change  12/22/20 12/21/20 1000   Infiltration Grading ([facility], [facility]) 0 12/22/20 2000   Phlebitis Scale ([facility] Only) 0 12/22/20 2000           Urethral Catheter (Active)   Site Assessment Clean;Skin intact 12/23/20 0851   Collection Container Standard drainage bag 12/23/20 0851   Urinary Catheter Care Tamper Evident Seal in Place;Drainage Tube Extended;Drainage Bag Below Bladder Level and Not on Floor;Cath Care Done with Soap & Water;Drainage Tubing Properly Secured 12/23/20 0851   Securement Method Securing device (Describe) 12/23/20 0851   Output (mL) 300 mL 12/23/20 0320        MENTAL STATUS ON TRANSFER  Level of Consciousness: Alert  Orientation : Oriented x 4  Speech: Speech Clear    CONSULTATIONS  Plastic surgery - [facility]  ID - [facility]    PROCEDURES  Excisional debridement of sacral pressure ulcer including skin, subcutaneous tissue, muscle and fascia with sacral bone biopsy on 12/20/2020.    LABORATORY  Lab Results   Component Value Date    SODIUM 143 12/23/2020    POTASSIUM 3.9 12/23/2020    CHLORIDE 111 12/23/2020    CO2 25 12/23/2020    GLUCOSE 91 12/23/2020    BUN 10 12/23/2020    CREATININE 0.54 12/23/2020        Lab Results   Component Value Date    WBC 6.2 12/23/2020    HEMOGLOBIN 9.9 (L) 12/23/2020    HEMATOCRIT 31.1 (L) 12/23/2020    PLATELETCT 230 12/23/2020        Total time of the discharge process exceeds 40 minutes.

## 2020-12-24 NOTE — PROGRESS NOTES
Discharge instructions given, all belongings with patient, and wound vac disconnected.    Report given to [facility], patient transporting to [facility].

## 2020-12-24 NOTE — PROGRESS NOTES
Report given to RN at [facility], all questions answered at this time, COBRA complete, D/C instructions complete.

## 2020-12-24 NOTE — DISCHARGE INSTRUCTIONS
Discharge Instructions    Discharged to home by car with [facility]. Discharged via ambulance, hospital escort: Yes.  Special equipment needed: Not Applicable    Be sure to schedule a follow-up appointment with your primary care doctor or any specialists as instructed.     Discharge Plan:   Diet Plan: Discussed  Activity Level: Discussed  Confirmed Follow up Appointment: Patient to Call and Schedule Appointment  Confirmed Symptoms Management: Discussed  Medication Reconciliation Updated: Yes  Influenza Vaccine Indication: Not indicated: Previously immunized this influenza season and > 8 years of age    I understand that a diet low in cholesterol, fat, and sodium is recommended for good health. Unless I have been given specific instructions below for another diet, I accept this instruction as my diet prescription.   Other diet: Regular     Special Instructions: None    · Is patient discharged on Warfarin / Coumadin?   No     Depression / Suicide Risk    As you are discharged from this [facility] facility, it is important to learn how to keep safe from harming yourself.    Recognize the warning signs:  · Abrupt changes in personality, positive or negative- including increase in energy   · Giving away possessions  · Change in eating patterns- significant weight changes-  positive or negative  · Change in sleeping patterns- unable to sleep or sleeping all the time   · Unwillingness or inability to communicate  · Depression  · Unusual sadness, discouragement and loneliness  · Talk of wanting to die  · Neglect of personal appearance   · Rebelliousness- reckless behavior  · Withdrawal from people/activities they love  · Confusion- inability to concentrate     If you or a loved one observes any of these behaviors or has concerns about self-harm, here's what you can do:  · Talk about it- your feelings and reasons for harming yourself  · Remove any means that you might use to hurt yourself (examples: pills, rope, extension  cords, firearm)  · Get professional help from the community (Mental Health, Substance Abuse, psychological counseling)  · Do not be alone:Call your Safe Contact- someone whom you trust who will be there for you.  · Call your local CRISIS HOTLINE 001-8112 or 758-275-8253  · Call your local Children's Mobile Crisis Response Team [place] (264) 083-2452 or www.yavalu  · Call the toll free National Suicide Prevention Hotlines   · National Suicide Prevention Lifeline 034-961-VSSZ (6339)  · Curbside Hope Line Network 800-SUICIDE (789-6729)    Vacuum-Assisted Closure Therapy  Vacuum-assisted closure (VAC) therapy uses a device that removes fluid and germs from wounds to help them heal. It is used on wounds that cannot be closed with stitches. They often heal slowly. Vacuum-assisted therapy helps the wound stay clean and healthy while the open wound slowly grows back together.  Vacuum-assisted closure therapy uses a bandage (dressing) that is made of foam. It is put inside the wound. Then, a drape is placed over the wound. This drape sticks to your skin to keep air out, and to protect the wound. A tube is hooked up to a small pump and is attached to the drape. The pump sucks out the fluid and germs. Vacuum-assisted closure therapy can also help reduce the bad smell that comes from the wound.  HOW DOES IT WORK?   The vacuum pump pulls fluid through the foam dressing. The dressing may wrinkle during this process. The fluid goes into the tube and away from the wound. The fluid then goes into a container. The fluid in the container must be replaced if it is full or at least once a week, even if the container is not full. The pulling from the pump helps to close the wound and bring better circulation to the wound area.  The foam dressing covers and protects the wound. It helps your wound heal faster.   HOW DOES IT FEEL?   · You might feel a little pulling when the pump is on.  · You might also feel a mild  vibrating sensation.    · You might feel some discomfort when the dressing is taken off.  CAN I MOVE AROUND WITH VACUUM-ASSISTED CLOSURE THERAPY?  Yes, it has a backup battery which is used when the machine is not plugged in, as long as the battery is working, you can move freely.  WHAT ARE SOME THINGS I MUST KNOW?  · Do not turn off the pump yourself, unless instructed to do so by your healthcare provider, such as for bathing.  · Do not take off the dressing yourself, unless instructed to do so by your caregiver.  · You can wash or shower with the dressing. However, do not take the pump into the shower. Make sure the wound dressing is protected and covered with plastic. The wound area must stay dry.  · Do not turn off the pump for more than 2 hours. If the pump is off for more than 2 hours, your nurse must change your dressing.  · Check frequently that the machine is on, that the machine indicates the therapy is on, and that all clamps are open.  THE ALARM IS SOUNDING! WHAT SHOULD I DO?   · Stay calm.  · Do not turn off the pump or do anything with the dressing.  · Call your clinic or caregiver right away if the alarm goes off and you cannot fix the problem. Some reasons the alarm might go off include:  ¨ The fluid collection container is full.  ¨ The battery is low.  ¨ The dressing has a leak.  · Explain to your caregiver what is happening. Follow the instructions you receive.  WHEN SHOULD I CALL FOR HELP?   · You have severe pain.  · You have difficulty breathing.  · You have bleeding that will not stop.  · Your wound smells bad.  · You have redness, swelling, or fluid leaking from your wound.  · Your alarm goes off and you do not know what to do.  · You have a fever.  · Your wound itches severely.  · Your dressing changes are often painful or bleeding often occurs.  · You have diarrhea.  · You have a sore throat.  · You have a rash around the dressing or anywhere else on your body.  · You feel nauseous.  · You  feel dizzy or weak.  · The VAC machine has been off for more than 2 hours.  HOW DO I GET READY TO GO HOME WITH A PUMP?   A trained caregiver will talk to you and answer your questions about your vacuum-assisted closure therapy before you go home. He or she will explain what to expect. A caregiver will come to your home to apply the pump and care for your wound.  The at-home caregiver will be available for questions and will come back for the scheduled dressing changes, usually every 48-72 hours (or more often for severely infected wounds). Your at-home caregiver will also come if you are having an unexpected problem. If you have questions or do not know what to do when you go home, talk to your healthcare provider.  This information is not intended to replace advice given to you by your health care provider. Make sure you discuss any questions you have with your health care provider.  Document Released: 11/30/2009 Document Revised: 08/20/2014 Document Reviewed: 09/22/2016  Elsevier Interactive Patient Education © 2017 [employer]

## 2020-12-24 NOTE — DISCHARGE PLANNING
[facility]itional [facility] Coordination     Referral from:  Dr. Shafer  Facesheet indicates:  Medicare/[employer] Insurance  Potential Rehab Diagnosis:     Chart review indicates patient has on going medical management and therapy needs to possibly meet inpatient rehab facility criteria with the goal of returning to community.    D/C support:       Physiatry consult denied.  Chart reflects discharge to [facility] in progress, transport scheduled 2000 today.       Last Covid test date:  12/18/2020    Thank you for the referral.

## 2020-12-25 LAB
BACTERIA WND AEROBE CULT: ABNORMAL
BACTERIA WND AEROBE CULT: ABNORMAL
GRAM STN SPEC: ABNORMAL
SIGNIFICANT IND 70042: ABNORMAL
SITE SITE: ABNORMAL
SOURCE SOURCE: ABNORMAL

## 2021-01-15 DIAGNOSIS — Z23 NEED FOR VACCINATION: ICD-10-CM

## 2021-02-23 ENCOUNTER — HOSPITAL ENCOUNTER (OUTPATIENT)
Dept: RADIOLOGY | Facility: MEDICAL CENTER | Age: 71
End: 2021-02-23
Attending: UROLOGY
Payer: MEDICARE

## 2021-02-23 DIAGNOSIS — N31.9 NEUROMUSCULAR DYSFUNCTION OF BLADDER: ICD-10-CM

## 2021-02-23 PROCEDURE — 76775 US EXAM ABDO BACK WALL LIM: CPT

## 2021-02-26 ENCOUNTER — HOSPITAL ENCOUNTER (OUTPATIENT)
Facility: MEDICAL CENTER | Age: 71
End: 2021-02-26
Attending: UROLOGY
Payer: MEDICARE

## 2021-02-26 PROCEDURE — 87086 URINE CULTURE/COLONY COUNT: CPT

## 2021-02-26 PROCEDURE — 87186 SC STD MICRODIL/AGAR DIL: CPT

## 2021-02-26 PROCEDURE — 87077 CULTURE AEROBIC IDENTIFY: CPT | Mod: 91

## 2021-03-03 LAB
ALBUMIN SERPL-MCNC: 3.1 G/DL (ref 3.4–5)
ALP SERPL-CCNC: 89 U/L (ref 45–117)
ALT SERPL-CCNC: 19 U/L (ref 12–78)
ANION GAP SERPL CALC-SCNC: 6 MMOL/L (ref 5–15)
BILIRUB SERPL-MCNC: 0.4 MG/DL (ref 0.2–1)
CALCIUM SERPL-MCNC: 8.8 MG/DL (ref 8.5–10.1)
CHLORIDE SERPL-SCNC: 112 MMOL/L (ref 98–107)
CREAT SERPL-MCNC: 0.57 MG/DL (ref 0.7–1.3)
PROT SERPL-MCNC: 7.4 G/DL (ref 6.4–8.2)

## 2021-03-05 ENCOUNTER — HOSPITAL ENCOUNTER (OUTPATIENT)
Dept: HOSPITAL 8 - OUT | Age: 71
Discharge: HOME | End: 2021-03-05
Attending: UROLOGY
Payer: MEDICARE

## 2021-03-05 VITALS — BODY MASS INDEX: 30.13 KG/M2 | WEIGHT: 210.43 LBS | HEIGHT: 70 IN

## 2021-03-05 VITALS — SYSTOLIC BLOOD PRESSURE: 99 MMHG | DIASTOLIC BLOOD PRESSURE: 64 MMHG

## 2021-03-05 DIAGNOSIS — Z79.01: ICD-10-CM

## 2021-03-05 DIAGNOSIS — I48.91: ICD-10-CM

## 2021-03-05 DIAGNOSIS — Z98.1: ICD-10-CM

## 2021-03-05 DIAGNOSIS — Z79.891: ICD-10-CM

## 2021-03-05 DIAGNOSIS — Z88.2: ICD-10-CM

## 2021-03-05 DIAGNOSIS — R33.9: ICD-10-CM

## 2021-03-05 DIAGNOSIS — N31.9: Primary | ICD-10-CM

## 2021-03-05 DIAGNOSIS — Z20.822: ICD-10-CM

## 2021-03-05 DIAGNOSIS — Z79.899: ICD-10-CM

## 2021-03-05 DIAGNOSIS — G82.20: ICD-10-CM

## 2021-03-05 DIAGNOSIS — I10: ICD-10-CM

## 2021-03-05 PROCEDURE — 51102 DRAIN BL W/CATH INSERTION: CPT

## 2021-03-05 PROCEDURE — 36415 COLL VENOUS BLD VENIPUNCTURE: CPT

## 2021-03-05 PROCEDURE — 80053 COMPREHEN METABOLIC PANEL: CPT

## 2021-03-05 PROCEDURE — U0003 INFECTIOUS AGENT DETECTION BY NUCLEIC ACID (DNA OR RNA); SEVERE ACUTE RESPIRATORY SYNDROME CORONAVIRUS 2 (SARS-COV-2) (CORONAVIRUS DISEASE [COVID-19]), AMPLIFIED PROBE TECHNIQUE, MAKING USE OF HIGH THROUGHPUT TECHNOLOGIES AS DESCRIBED BY CMS-2020-01-R: HCPCS

## 2021-03-05 PROCEDURE — 93005 ELECTROCARDIOGRAM TRACING: CPT

## 2021-04-02 ENCOUNTER — HOSPITAL ENCOUNTER (OUTPATIENT)
Facility: MEDICAL CENTER | Age: 71
End: 2021-04-02
Attending: PHYSICIAN ASSISTANT
Payer: MEDICARE

## 2021-04-02 PROCEDURE — 87186 SC STD MICRODIL/AGAR DIL: CPT

## 2021-04-02 PROCEDURE — 87077 CULTURE AEROBIC IDENTIFY: CPT

## 2021-04-02 PROCEDURE — 87086 URINE CULTURE/COLONY COUNT: CPT

## 2021-05-01 ENCOUNTER — HOSPITAL ENCOUNTER (EMERGENCY)
Facility: MEDICAL CENTER | Age: 71
End: 2021-05-02
Attending: EMERGENCY MEDICINE
Payer: MEDICARE

## 2021-05-01 DIAGNOSIS — N39.0 ACUTE URINARY TRACT INFECTION: ICD-10-CM

## 2021-05-01 DIAGNOSIS — R31.0 GROSS HEMATURIA: ICD-10-CM

## 2021-05-01 DIAGNOSIS — Z93.59 PRESENCE OF SUPRAPUBIC CATHETER (HCC): ICD-10-CM

## 2021-05-01 DIAGNOSIS — Z79.01 CHRONIC ANTICOAGULATION: ICD-10-CM

## 2021-05-01 LAB
ANION GAP SERPL CALC-SCNC: 11 MMOL/L (ref 7–16)
APPEARANCE UR: ABNORMAL
APTT PPP: 40.2 SEC (ref 24.7–36)
BACTERIA #/AREA URNS HPF: ABNORMAL /HPF
BASOPHILS # BLD AUTO: 0.4 % (ref 0–1.8)
BASOPHILS # BLD: 0.02 K/UL (ref 0–0.12)
BUN SERPL-MCNC: 24 MG/DL (ref 8–22)
CALCIUM SERPL-MCNC: 9.4 MG/DL (ref 8.4–10.2)
CHLORIDE SERPL-SCNC: 109 MMOL/L (ref 96–112)
CO2 SERPL-SCNC: 24 MMOL/L (ref 20–33)
COLOR UR: ABNORMAL
CREAT SERPL-MCNC: 0.49 MG/DL (ref 0.5–1.4)
EOSINOPHIL # BLD AUTO: 0.24 K/UL (ref 0–0.51)
EOSINOPHIL NFR BLD: 4.5 % (ref 0–6.9)
ERYTHROCYTE [DISTWIDTH] IN BLOOD BY AUTOMATED COUNT: 53.7 FL (ref 35.9–50)
GLUCOSE SERPL-MCNC: 83 MG/DL (ref 65–99)
HCT VFR BLD AUTO: 39.7 % (ref 42–52)
HGB BLD-MCNC: 13.2 G/DL (ref 14–18)
IMM GRANULOCYTES # BLD AUTO: 0.03 K/UL (ref 0–0.11)
IMM GRANULOCYTES NFR BLD AUTO: 0.6 % (ref 0–0.9)
INR PPP: 1.23 (ref 0.87–1.13)
LYMPHOCYTES # BLD AUTO: 1.33 K/UL (ref 1–4.8)
LYMPHOCYTES NFR BLD: 24.7 % (ref 22–41)
MCH RBC QN AUTO: 34.4 PG (ref 27–33)
MCHC RBC AUTO-ENTMCNC: 33.2 G/DL (ref 33.7–35.3)
MCV RBC AUTO: 103.4 FL (ref 81.4–97.8)
MICRO URNS: ABNORMAL
MONOCYTES # BLD AUTO: 0.64 K/UL (ref 0–0.85)
MONOCYTES NFR BLD AUTO: 11.9 % (ref 0–13.4)
NEUTROPHILS # BLD AUTO: 3.13 K/UL (ref 1.82–7.42)
NEUTROPHILS NFR BLD: 57.9 % (ref 44–72)
NRBC # BLD AUTO: 0 K/UL
NRBC BLD-RTO: 0 /100 WBC
PH UR STRIP.AUTO: ABNORMAL [PH] (ref 5–8)
PLATELET # BLD AUTO: 137 K/UL (ref 164–446)
PMV BLD AUTO: 9 FL (ref 9–12.9)
POTASSIUM SERPL-SCNC: 4 MMOL/L (ref 3.6–5.5)
PROTHROMBIN TIME: 15.2 SEC (ref 12–14.6)
RBC # BLD AUTO: 3.84 M/UL (ref 4.7–6.1)
RBC # URNS HPF: >150 /HPF
SODIUM SERPL-SCNC: 144 MMOL/L (ref 135–145)
SP GR UR STRIP.AUTO: 1.01
TRANS CELLS URNS QL MICRO: ABNORMAL /HPF
WBC # BLD AUTO: 5.4 K/UL (ref 4.8–10.8)
WBC #/AREA URNS HPF: ABNORMAL /HPF

## 2021-05-01 PROCEDURE — 80048 BASIC METABOLIC PNL TOTAL CA: CPT

## 2021-05-01 PROCEDURE — 94760 N-INVAS EAR/PLS OXIMETRY 1: CPT

## 2021-05-01 PROCEDURE — 87186 SC STD MICRODIL/AGAR DIL: CPT | Mod: 91

## 2021-05-01 PROCEDURE — 700105 HCHG RX REV CODE 258: Performed by: EMERGENCY MEDICINE

## 2021-05-01 PROCEDURE — 87086 URINE CULTURE/COLONY COUNT: CPT

## 2021-05-01 PROCEDURE — 700111 HCHG RX REV CODE 636 W/ 250 OVERRIDE (IP): Performed by: EMERGENCY MEDICINE

## 2021-05-01 PROCEDURE — 85610 PROTHROMBIN TIME: CPT

## 2021-05-01 PROCEDURE — 85730 THROMBOPLASTIN TIME PARTIAL: CPT

## 2021-05-01 PROCEDURE — 96365 THER/PROPH/DIAG IV INF INIT: CPT

## 2021-05-01 PROCEDURE — 85025 COMPLETE CBC W/AUTO DIFF WBC: CPT

## 2021-05-01 PROCEDURE — 99285 EMERGENCY DEPT VISIT HI MDM: CPT

## 2021-05-01 PROCEDURE — 81001 URINALYSIS AUTO W/SCOPE: CPT

## 2021-05-01 PROCEDURE — 87077 CULTURE AEROBIC IDENTIFY: CPT | Mod: 91

## 2021-05-01 RX ORDER — SODIUM CHLORIDE 9 MG/ML
INJECTION, SOLUTION INTRAVENOUS CONTINUOUS
Status: DISCONTINUED | OUTPATIENT
Start: 2021-05-01 | End: 2021-05-02 | Stop reason: HOSPADM

## 2021-05-01 RX ADMIN — CEFTRIAXONE SODIUM 2 G: 2 INJECTION, POWDER, FOR SOLUTION INTRAMUSCULAR; INTRAVENOUS at 23:56

## 2021-05-01 RX ADMIN — SODIUM CHLORIDE: 9 INJECTION, SOLUTION INTRAVENOUS at 23:00

## 2021-05-01 ASSESSMENT — FIBROSIS 4 INDEX: FIB4 SCORE: 1.09

## 2021-05-01 NOTE — LETTER
5/5/2021               Osvaldo Pate  [address] [address] [address]        Dear Osvaldo (MR#[id number])    This letter is sent in regards to your recent visit to the [facility] Emergency Department on 5/1/2021. During the visit, tests were performed to assist the physician in your medical diagnosis. A review of your tests requires that we notify you of the following:    Your urine culture was POSITIVE for bacteria called Enterococcus faecalis and Pseudomonas aeruginosa. The antibiotic prescribed for you (levofloxacin) should be active to treat your urinary tract infection. It is important that you continue taking your antibiotic until it is finished.     If you are not feeling better, or have new symptoms such as fever or chills, please give me a call back at the number below. Also feel free to contact me if you have any other  questions or concerns. Thank you for your cooperation in the matter.    Sincerely,  ED Culture Follow-Up Staff  Alexandria Mao, PharmD    [facility], Emergency Department  [address] [place] [id number] [phone] (ED Culture Line)

## 2021-05-01 NOTE — LETTER
5/5/2021               Osvaldo Pate  [address] [address] [address]        Dear Osvaldo (MR#[id number])    This letter is sent in regards to your recent visit to the [facility] Emergency Department on 5/1/2021. During the visit, tests were performed to assist the physician in your medical diagnosis. A review of your tests requires that we notify you of the following:    Your {SITE:85034} culture was POSITIVE for a bacteria called {BACTERIA:33132}. The antibiotic prescribed for you ({ANTIBIOTICS:80334}) should be active to treat this bacteria. It is important that you continue taking your antibiotic until it is finished.     Please feel free to contact me at the number below if you have any questions or concerns. Thank you for your cooperation in the matter.    Sincerely,  ED Culture Follow-Up Staff  Alexandria Mao, PharmD    [facility], Emergency Department  [address] [place] [id number] [phone] (ED Culture Line)

## 2021-05-02 VITALS
HEIGHT: 71 IN | WEIGHT: 195 LBS | OXYGEN SATURATION: 96 % | BODY MASS INDEX: 27.3 KG/M2 | DIASTOLIC BLOOD PRESSURE: 64 MMHG | RESPIRATION RATE: 18 BRPM | SYSTOLIC BLOOD PRESSURE: 111 MMHG | HEART RATE: 62 BPM | TEMPERATURE: 98.8 F

## 2021-05-02 RX ORDER — LEVOFLOXACIN 750 MG/1
750 TABLET, FILM COATED ORAL DAILY
Qty: 10 TABLET | Refills: 0 | Status: SHIPPED | OUTPATIENT
Start: 2021-05-02 | End: 2021-10-23

## 2021-05-02 NOTE — ED TRIAGE NOTES
Assumed patient care. Pt assesement done.  Pt reports noticing blood from suprapubic catheter that started this afternoon. Pt reports catheter was switched to larger size x2 weeks ago. Pt denies nay pain or recent illness.  Plan of care reviewed with patient.

## 2021-05-02 NOTE — ED NOTES
[facility] Contact, will be available to transport pt back to [facility] as soon as available. [facility] informed of pt DC and awaiting on [facility] transport.

## 2021-05-02 NOTE — ED PROVIDER NOTES
CHIEF COMPLAINT  Chief Complaint   Patient presents with   • Blood in Urine     Bllod in urine from suprapubic cath started today.       HPI  Osvaldo Pate is a 70 y.o. male who presents tonight with a chief complaint of blood in his urine.  He states he has a suprapubic catheter secondary to his quadriplegia and had an episode of blood in his urine a week ago.  His home health care nurse thought that it may be due to his blood thinners but they did not have him stop the blood thinners.  He denies any fever or shaking chills.  He states he laid back to take a nap today woke up and there was a large amount of blood in his Cazares bag and catheter.    REVIEW OF SYSTEMS  See HPI for further details. All other system reviews are negative.    PAST MEDICAL HISTORY  Past Medical History:   Diagnosis Date   • A-fib ([facility])    • Atrial flutter (H[facility])    • GERD (gastroesophageal reflux disease)    • Hypertension    • Neurogenic bladder    • Quadriplegia, C5-C7 complete ([facility])        FAMILY HISTORY  Family History   Problem Relation Age of Onset   • Heart Disease Father        SOCIAL HISTORY  Social History     Socioeconomic History   • Marital status:      Spouse name: Not on file   • Number of children: Not on file   • Years of education: Not on file   • Highest education level: Not on file   Occupational History   • Not on file   Tobacco Use   • Smoking status: Former Smoker     Packs/day: 1.00     Types: Cigarettes     Start date: 1967     Quit date: 1977     Years since quittin.3   • Smokeless tobacco: Never Used   Substance and Sexual Activity   • Alcohol use: Yes   • Drug use: No   • Sexual activity: Not on file   Other Topics Concern   • Not on file   Social History Narrative   • Not on file     Social Determinants of Health     Financial Resource Strain:    • Difficulty of Paying Living Expenses:    Food Insecurity:    • Worried About Running Out of Food in the Last Year:    • Ran Out of Food in the  "Last Year:    Transportation Needs:    • Lack of Transportation (Medical):    • Lack of Transportation (Non-Medical):    Physical Activity:    • Days of Exercise per Week:    • Minutes of Exercise per Session:    Stress:    • Feeling of Stress :    Social Connections:    • Frequency of Communication with Friends and Family:    • Frequency of Social Gatherings with Friends and Family:    • Attends [employer] Services:    • Active Member of Clubs or Organizations:    • Attends Club or Organization Meetings:    • Marital Status:    Intimate Partner Violence:    • Fear of Current or Ex-Partner:    • Emotionally Abused:    • Physically Abused:    • Sexually Abused:        SURGICAL HISTORY  Past Surgical History:   Procedure Laterality Date   • IRRIGATION & DEBRIDEMENT GENERAL  12/20/2020    Procedure: IRRIGATION AND DEBRIDEMENT, WOUND SACRAL ULCER;  Surgeon: Matt Cummins M.D.;  Location: [facility];  Service: Plastics   • ULCER DEBRIDEMENT N/A 8/21/2019    Procedure: debridement of Sacral grade 4 ulcer - W/BONE BIOPSY, 3 liter wash out. bilateral sliding gluteal myocutaneous flap advancement;  Surgeon: Amadeo Moon M.D.;  Location: [facility];  Service: Plastics   • FLAP CLOSURE  8/21/2019    Procedure: CLOSURE, FLAP - MUSCLE;  Surgeon: Amadeo Moon M.D.;  Location: [facility];  Service: Plastics   • COLOSTOMY N/A 7/27/2019    Procedure: CREATION, COLOSTOMY -  placement;  Surgeon: Elías Hannah M.D.;  Location: [facility];  Service: General   • COLOSTOMY     • COLOSTOMY TAKEDOWN     • HERNIA REPAIR     • PERCUTANEOUOSPINNING LOWER EXTREMITY         CURRENT MEDICATIONS  See nurses notes    ALLERGIES  Allergies   Allergen Reactions   • Sulfa Drugs Rash     Rash         PHYSICAL EXAM  VITAL SIGNS: /61   Pulse 60   Temp 37.1 °C (98.8 °F)   Resp 12   Ht 1.803 m (5' 11\")   Wt 88.5 kg (195 lb)   SpO2 97%   BMI 27.20 kg/m²     Constitutional: " Patient is well developed, well nourished in no acute distress and non-toxic appearing.   HENT: Normocephalic, moist oral mucosa.  Neck: Supple  Lymphatic: No lymphadenopathy noted.   Cardiovascular: Normal heart rate and rhythm. No murmur  Thorax & Lungs: Clear and equal breath sounds with good excursion. No respiratory distress  Abdomen: Bowel sounds normal in all four quadrants. Soft,nontender   Skin: Warm, Dry, No erythema, No rashes.   Genitalia: Normal external male genitalia with a suprapubic catheter in place with grossly bloody urine.  Extremities: Peripheral pulses 4/4   Neurologic: Alert & oriented x 3, patient is quadriplegic with atony to his lower extremities, he is able to move his upper extremities.  He has no sensation below his mid chest cavity.  Psychiatric: Affect normal, Judgment normal, Mood normal.     Results for orders placed or performed during the hospital encounter of 05/01/21   URINALYSIS CULTURE, IF INDICATED    Specimen: Urine, Suprapubic   Result Value Ref Range    Color Red (A)     Character Turbid (A)     Specific Gravity 1.015 <1.035    Ph see comment 5.0 - 8.0    Micro Urine Req Microscopic    CBC WITH DIFFERENTIAL   Result Value Ref Range    WBC 5.4 4.8 - 10.8 K/uL    RBC 3.84 (L) 4.70 - 6.10 M/uL    Hemoglobin 13.2 (L) 14.0 - 18.0 g/dL    Hematocrit 39.7 (L) 42.0 - 52.0 %    .4 (H) 81.4 - 97.8 fL    MCH 34.4 (H) 27.0 - 33.0 pg    MCHC 33.2 (L) 33.7 - 35.3 g/dL    RDW 53.7 (H) 35.9 - 50.0 fL    Platelet Count 137 (L) 164 - 446 K/uL    MPV 9.0 9.0 - 12.9 fL    Neutrophils-Polys 57.90 44.00 - 72.00 %    Lymphocytes 24.70 22.00 - 41.00 %    Monocytes 11.90 0.00 - 13.40 %    Eosinophils 4.50 0.00 - 6.90 %    Basophils 0.40 0.00 - 1.80 %    Immature Granulocytes 0.60 0.00 - 0.90 %    Nucleated RBC 0.00 /100 WBC    Neutrophils (Absolute) 3.13 1.82 - 7.42 K/uL    Lymphs (Absolute) 1.33 1.00 - 4.80 K/uL    Monos (Absolute) 0.64 0.00 - 0.85 K/uL    Eos (Absolute) 0.24 0.00 - 0.51  K/uL    Baso (Absolute) 0.02 0.00 - 0.12 K/uL    Immature Granulocytes (abs) 0.03 0.00 - 0.11 K/uL    NRBC (Absolute) 0.00 K/uL   BASIC METABOLIC PANEL   Result Value Ref Range    Sodium 144 135 - 145 mmol/L    Potassium 4.0 3.6 - 5.5 mmol/L    Chloride 109 96 - 112 mmol/L    Co2 24 20 - 33 mmol/L    Glucose 83 65 - 99 mg/dL    Bun 24 (H) 8 - 22 mg/dL    Creatinine 0.49 (L) 0.50 - 1.40 mg/dL    Calcium 9.4 8.4 - 10.2 mg/dL    Anion Gap 11.0 7.0 - 16.0   PROTHROMBIN TIME (INR)   Result Value Ref Range    PT 15.2 (H) 12.0 - 14.6 sec    INR 1.23 (H) 0.87 - 1.13   APTT   Result Value Ref Range    APTT 40.2 (H) 24.7 - 36.0 sec   URINE MICROSCOPIC (W/UA)   Result Value Ref Range    WBC 10-20 (A) /hpf    RBC >150 (A) /hpf    Bacteria Many (A) None /hpf    Trans Epithelial Cells Few /hpf   ESTIMATED GFR   Result Value Ref Range    GFR If African American >60 >60 mL/min/1.73 m 2    GFR If Non African American >60 >60 mL/min/1.73 m 2   URINE CULTURE(NEW)    Specimen: Urine   Result Value Ref Range    Significant Indicator NEG     Source UR     Site -     Culture Result -          COURSE & MEDICAL DECISION MAKING  Pertinent Labs & Imaging studies reviewed. (See chart for details)  Patient received an IV of normal saline, laboratories were drawn and urinalysis was obtained.  His urine is grossly infected with many bacteria, greater than 150 red cells and 10-20 white cells.  His PT and INR showed PT of 15.2 with an INR of 1.23 and a PTT of 40.2.  Electrolytes are unremarkable, BUN and creatinine are within normal limits.  He has a normal white blood cell count with a stable H&H of 13 and 39.  I gave him Rocephin 2 g IV piggyback for his urinary tract infection along with some fluids and I will call a prescription for Levaquin 750 mg for him.  He is to increase fluids, take the antibiotics until gone.  I did let him know that we did a culture on his urine and he is to contact his physician within the next 48 hours for those  results to see if we need to change his antibiotics.  Otherwise he is to follow-up with [facility] [place] for further evaluation and treatment.  He is also to withhold his anticoagulation therapy until Wednesday with the hopes that this will stop the bleeding.  If it does not he will have to withhold longer.  He will be discharged in stable and improved condition to return if any problems or worsening.    FINAL IMPRESSION  1.  Chronic anticoagulation therapy  2.  Acute urinary tract infection  3.  Gross hematuria  4.  Presence of suprapubic catheter         Electronically signed by: Racquel Amin D.O., 5/2/2021 1:05 [facility] Provider Note

## 2021-05-02 NOTE — DISCHARGE INSTRUCTIONS
Increase fluids especially water and water based products  Take the antibiotics until completely gone  I have cultured your urine tonight and the results should be back within 48 hours.    Please contact [facility] [place] or your primary care doctor for culture results to see if the antibiotics that I am prescribing will cover your infection.  Return if elevated fever greater than 101, worsening bleeding.  Stop taking your blood thinners until Wednesday.  If you have persistent blood in your urine then you may need to hold it for another few days as well.  If the bleeding stops you may resume your normal dosage on Wednesday.

## 2021-05-05 NOTE — ED NOTES
ED Positive Culture Follow-up/Notification Note:    Date: 5/5/2021     Patient seen in the ED on 5/1/2021 for hematuria from suprapubic catheter.   1. Chronic anticoagulation    2. Gross hematuria    3. Acute urinary tract infection    4. Presence of suprapubic catheter (HCC)       Discharge Medication List as of 5/2/2021  1:43 AM      START taking these medications    Details   levoFLOXacin (LEVAQUIN) 750 MG tablet Take 1 tablet by mouth every day., Disp-10 tablet, R-0, Normal           Patient received Rocephin 2 g IV x1 in the ED.    Allergies: Sulfa drugs     Vitals:    05/02/21 0010 05/02/21 0040 05/02/21 0129 05/02/21 0159   BP: 113/60 113/61 106/70 111/64   Pulse: (!) 57 60 60 62   Resp: 18 12 18 18   Temp:       SpO2: 97%  96% 96%   Weight:       Height:           Final cultures:   Results     Procedure Component Value Units Date/Time    URINE CULTURE(NEW) [[id number]]  (Abnormal)  (Susceptibility) Collected: 05/01/21 2220    Order Status: Completed Specimen: Urine Updated: 05/05/21 0939     Significant Indicator POS     Source UR     Site -     Culture Result Growth noted after further incubation, see below for  organism identification.        Enterococcus faecalis  >100,000 cfu/mL        Pseudomonas aeruginosa  >100,000 cfu/mL      Narrative:      Hematuria on blood thinners  Indication for culture:->Spinal cord injury patient with  increased spasticity, Autonomic dysreflexia, sense of unease    Susceptibility     Enterococcus faecalis (1)     Antibiotic Interpretation Microscan Method Status    Ciprofloxacin [*]  Resistant >2 mcg/mL ROSE Final    Daptomycin Sensitive 1 mcg/mL ROSE Final    Nitrofurantoin Sensitive <=32 mcg/mL ROSE Final    Gent Synergy [*]  Resistant >500 mcg/mL ROSE Final    Levofloxacin [*]  Resistant >4 mcg/mL ROSE Final    Linezolid [*]  Sensitive 2 mcg/mL ROSE Final    Rifampin [*]  Sensitive <=1 mcg/mL ROSE Final    Strep Synergy [*]  Resistant >1000 mcg/mL ROSE Final    Tetracycline  Resistant >8 mcg/mL ROSE Final    Tigecycline [*]  Sensitive <=0.25 mcg/mL ROSE Final    Ampicillin Sensitive <=2 mcg/mL ROSE Final    Vancomycin Sensitive 1 mcg/mL ROSE Final          Pseudomonas aeruginosa (2)     Antibiotic Interpretation Microscan Method Status    Ciprofloxacin Sensitive 0.5 mcg/mL ROSE Final    Levofloxacin Sensitive 1 mcg/mL ROSE Final    Tobramycin Intermediate 8 mcg/mL ROSE Final    Amikacin Resistant >32 mcg/mL ROSE Final    Aztreonam [*]  Sensitive <=4 mcg/mL ROSE Final    Ceftolozane+Tazobactam [*]  Sensitive <=2 mcg/mL ROSE Final    Ceftazidime [*]  Sensitive 4 mcg/mL ROSE Final    Cefepime Sensitive 8 mcg/mL ROSE Final    Gentamicin Resistant >8 mcg/mL ROSE Final    Imipenem [*]  Sensitive <=1 mcg/mL ROSE Final    Meropenem Sensitive <=1 mcg/mL ROSE Final    Pip/Tazobactam Sensitive <=8 mcg/mL ROSE Final           [*]   Suppressed Antibiotic                 URINALYSIS CULTURE, IF INDICATED [[id number]]  (Abnormal) Collected: 05/01/21 2220    Order Status: Completed Specimen: Urine, Suprapubic Updated: 05/01/21 2310     Color Red     Character Turbid     Specific Gravity 1.015     Ph see comment     Comment: Unable to determine test result (pH, glucose, ketones, protein,  bilirubin, nitrites, leukocyte esterase + blood) due to color  interference from the urine.          Micro Urine Req Microscopic    Narrative:      Hematuria on blood thinners  Indication for culture:->Spinal cord injury patient with  increased spasticity, Autonomic dysreflexia, sense of unease        Plan:   Urine culture grew Pseudomonas sensitive to levofloxacin as well as E. faecalis which was not levofloxacin-sensitive. Attempted to contact patient via phone but no answer. [facility] with callback number. Sent letter to encourage compliance with levofloxacin and to contact us if he has any new fevers or chills, or hematuria is not improving. Levaquin should be adequate as E. faecalis is likely a colonizer.    However, if patient calls  back with persistent or new symptoms, will plan on calling in prescription for amoxicillin 500 mg PO Q8H x 10 days for E. faecalis coverage.    Alexandria Mao PharmD  PGY1 Pharmacy Practice Resident    Addendum 5/6/2021  Patient returned call. Patient states that he is feeling better and that his hematuria stopped last Sunday morning. Advised him to continue his levofloxacin until it is completely gone.    Alexandria Mao PharmD  PGY1 Pharmacy Practice Resident

## 2021-05-25 ENCOUNTER — OFFICE VISIT (OUTPATIENT)
Dept: CARDIOLOGY | Facility: MEDICAL CENTER | Age: 71
End: 2021-05-25
Payer: MEDICARE

## 2021-05-25 VITALS
SYSTOLIC BLOOD PRESSURE: 124 MMHG | OXYGEN SATURATION: 97 % | BODY MASS INDEX: 27.2 KG/M2 | DIASTOLIC BLOOD PRESSURE: 72 MMHG | HEART RATE: 51 BPM | WEIGHT: 195 LBS

## 2021-05-25 DIAGNOSIS — I48.4 ATYPICAL ATRIAL FLUTTER (HCC): ICD-10-CM

## 2021-05-25 LAB — EKG IMPRESSION: NORMAL

## 2021-05-25 PROCEDURE — 99214 OFFICE O/P EST MOD 30 MIN: CPT | Performed by: INTERNAL MEDICINE

## 2021-05-25 PROCEDURE — 93000 ELECTROCARDIOGRAM COMPLETE: CPT | Performed by: INTERNAL MEDICINE

## 2021-05-25 RX ORDER — CHOLECALCIFEROL (VITAMIN D3) 125 MCG
10 CAPSULE ORAL
COMMUNITY

## 2021-05-25 ASSESSMENT — FIBROSIS 4 INDEX
FIB4 SCORE: 1.83
FIB4 SCORE: 1.83

## 2021-05-25 NOTE — PROGRESS NOTES
Arrhythmia Clinic Note (Established patient)    DOS: 5/25/2021    Chief complaint/Reason for consult: Atrial flutter    Interval History: Previously seen for typical atrial flutter.  We had discussed EP study and ablation.  This was initially planned last year, however due to COVID-19 pandemic this was canceled.  At some point over the past year he returned spontaneously to sinus rhythm.  He believes that he is in sinus rhythm most of the time he checks his vital signs.  He has been having issues with taking Xarelto including recent emergency room visit for suprapubic catheter blood output.  He wishes to come off Xarelto if possible.  Denies palpitations.  No syncope or presyncope.    ROS (+ highlighted in bold):  Constitutional: Fevers/chills/fatigue/weightloss  HEENT: Blurry vision/eye pain/sore throat/hearing loss  Respiratory: Shortness of breath/cough  Cardiovascular: Chest pain/palpitations/edema/orthopnea/syncope  GI: Nausea/vomitting/diarrhea  MSK: Arthralgias/myagias/muscle weakness  Skin: Rash/sores  Neurological: Numbness/tremors/vertigo  Endocrine: Excessive thirst/polyuria/cold intolerance/heat intolerance  Psych: Depression/anxiety    Past Medical History:   Diagnosis Date   • A-fib (HCC)    • Atrial flutter (HCC)    • GERD (gastroesophageal reflux disease)    • Hypertension    • Neurogenic bladder    • Quadriplegia, C5-C7 complete (HCC)        Past Surgical History:   Procedure Laterality Date   • IRRIGATION & DEBRIDEMENT GENERAL  12/20/2020    Procedure: IRRIGATION AND DEBRIDEMENT, WOUND SACRAL ULCER;  Surgeon: Matt Cummins M.D.;  Location: SURGERY [facility];  Service: Plastics   • ULCER DEBRIDEMENT N/A 8/21/2019    Procedure: debridement of Sacral grade 4 ulcer - W/BONE BIOPSY, 3 liter wash out. bilateral sliding gluteal myocutaneous flap advancement;  Surgeon: Amadeo Moon M.D.;  Location: SURGERY [facility];  Service: Plastics   • FLAP CLOSURE  8/21/2019    Procedure: CLOSURE,  FLAP - MUSCLE;  Surgeon: Amadeo Moon M.D.;  Location: SURGERY [facility];  Service: Plastics   • COLOSTOMY N/A 2019    Procedure: CREATION, COLOSTOMY -  placement;  Surgeon: Elías Hannah M.D.;  Location: SURGERY [facility];  Service: General   • COLOSTOMY     • COLOSTOMY TAKEDOWN     • HERNIA REPAIR     • PERCUTANEOUOSPINNING LOWER EXTREMITY         Social History     Socioeconomic History   • Marital status:      Spouse name: Not on file   • Number of children: Not on file   • Years of education: Not on file   • Highest education level: Not on file   Occupational History   • Not on file   Tobacco Use   • Smoking status: Former Smoker     Packs/day: 1.00     Types: Cigarettes     Start date: 1967     Quit date: 1977     Years since quittin.4   • Smokeless tobacco: Never Used   Vaping Use   • Vaping Use: Never used   Substance and Sexual Activity   • Alcohol use: Yes   • Drug use: No   • Sexual activity: Not on file   Other Topics Concern   • Not on file   Social History Narrative   • Not on file     Social Determinants of Health     Financial Resource Strain:    • Difficulty of Paying Living Expenses:    Food Insecurity:    • Worried About Running Out of Food in the Last Year:    • Ran Out of Food in the Last Year:    Transportation Needs:    • Lack of Transportation (Medical):    • Lack of Transportation (Non-Medical):    Physical Activity:    • Days of Exercise per Week:    • Minutes of Exercise per Session:    Stress:    • Feeling of Stress :    Social Connections:    • Frequency of Communication with Friends and Family:    • Frequency of Social Gatherings with Friends and Family:    • Attends Denominational Services:    • Active Member of Clubs or Organizations:    • Attends Club or Organization Meetings:    • Marital Status:    Intimate Partner Violence:    • Fear of Current or Ex-Partner:    • Emotionally Abused:    • Physically Abused:    • Sexually Abused:         Family History   Problem Relation Age of Onset   • Heart Disease Father        Allergies   Allergen Reactions   • Sulfa Drugs Rash     Rash         Current Outpatient Medications   Medication Sig Dispense Refill   • Melatonin 1 MG Chew Tab Chew at bedtime.     • rivaroxaban (XARELTO) 20 MG Tab tablet Take 1 Tab by mouth every day at 6 PM. 30 Tab    • magnesium oxide (MAG-OX) 400 MG Tab tablet Take 400 mg by mouth every day.     • polyethylene glycol/lytes (MIRALAX) 17 g Pack Take 17 g by mouth every day.     • ascorbic acid (ASCORBIC ACID) 500 MG Tab Take 500 mg by mouth 2 (two) times a day.     • Probiotic Product (PROBIOTIC PO) Take 1 Tab by mouth every day.     • acetaminophen (TYLENOL) 500 MG Tab Take 500-1,000 mg by mouth every four hours as needed. Takes 1000 mg twice daily then 500 mg every 4 hours as needed (3grams per 24 hours)     • fluticasone (FLONASE) 50 MCG/ACT nasal spray Administer 2 Sprays into affected nostril(S) 1 time a day as needed.     • Cholecalciferol (VITAMIN D) 2000 UNIT Tab Take 2,000 Units by mouth every day.     • sennosides (SENOKOT) 8.6 MG Tab Take 17.2 mg by mouth 2 times a day.     • ferrous sulfate 325 (65 Fe) MG tablet Take 325 mg by mouth 2 Times a Day.     • traZODone (DESYREL) 50 MG Tab Take 50 mg by mouth at bedtime as needed.     • docusate sodium (COLACE) 100 MG Cap Take 200 mg by mouth 2 times a day.     • baclofen (LIORESAL) 20 MG tablet Take 20 mg by mouth 4 times a day.     • losartan (COZAAR) 50 MG Tab Take 50 mg by mouth every day.     • therapeutic multivitamin-minerals (THERAGRAN-M) Tab Take 1 Tab by mouth every day.     • levoFLOXacin (LEVAQUIN) 750 MG tablet Take 1 tablet by mouth every day. 10 tablet 0     No current facility-administered medications for this visit.       Physical Exam:  Vitals:    05/25/21 1232 05/25/21 1253   BP: 106/74 124/72   BP Location: Left arm Right arm   Patient Position: Sitting Sitting   BP Cuff Size: Adult Adult   Pulse: (!) 51  (!) 51   SpO2: 97% 97%   Weight: 88.5 kg (195 lb) 88.5 kg (195 lb)     General appearance: NAD, conversant   Eyes: anicteric sclerae, moist conjunctivae; no lid-lag; PERRLA  HENT: Atraumatic; oropharynx clear with moist mucous membranes and no mucosal ulcerations; normal hard and soft palate  Neck: Trachea midline; FROM, supple, no thyromegaly or lymphadenopathy  Lungs: CTA, with normal respiratory effort and no intercostal retractions  CV: RRR, no MRGs, no JVD  Abdomen: Soft, non-tender; no masses or HSM  Extremities: No peripheral edema or extremity lymphadenopathy  Skin: Normal temperature, turgor and texture; no rash, ulcers or subcutaneous nodules  Psych: Appropriate affect, alert and oriented to person, place and time    Data:  Lipids:   No results found for: CHOLSTRLTOT, TRIGLYCERIDE, HDL, LDL     BMP:  Lab Results   Component Value Date/Time    SODIUM 144 05/01/2021 2220    POTASSIUM 4.0 05/01/2021 2220    CHLORIDE 109 05/01/2021 2220    CO2 24 05/01/2021 2220    GLUCOSE 83 05/01/2021 2220    BUN 24 (H) 05/01/2021 2220    CREATININE 0.49 (L) 05/01/2021 2220    CALCIUM 9.4 05/01/2021 2220    ANION 11.0 05/01/2021 2220        TSH:   Lab Results   Component Value Date/Time    TSHULTRASEN 0.350 (L) 12/09/2019 1129        THYROXINE (T4):   No results found for: ILA     CBC:   Lab Results   Component Value Date/Time    WBC 5.4 05/01/2021 10:20 PM    RBC 3.84 (L) 05/01/2021 10:20 PM    HEMOGLOBIN 13.2 (L) 05/01/2021 10:20 PM    HEMATOCRIT 39.7 (L) 05/01/2021 10:20 PM    .4 (H) 05/01/2021 10:20 PM    MCH 34.4 (H) 05/01/2021 10:20 PM    MCHC 33.2 (L) 05/01/2021 10:20 PM    RDW 53.7 (H) 05/01/2021 10:20 PM    PLATELETCT 137 (L) 05/01/2021 10:20 PM    MPV 9.0 05/01/2021 10:20 PM    NEUTSPOLYS 57.90 05/01/2021 10:20 PM    LYMPHOCYTES 24.70 05/01/2021 10:20 PM    MONOCYTES 11.90 05/01/2021 10:20 PM    EOSINOPHILS 4.50 05/01/2021 10:20 PM    BASOPHILS 0.40 05/01/2021 10:20 PM    IMMGRAN 0.60 05/01/2021 10:20 PM     NRBC 0.00 05/01/2021 10:20 PM    NEUTS 3.13 05/01/2021 10:20 PM    LYMPHS 1.33 05/01/2021 10:20 PM    LYMPHS 2 12/08/2019 05:35 PM    MONOS 0.64 05/01/2021 10:20 PM    EOS 0.24 05/01/2021 10:20 PM    BASO 0.02 05/01/2021 10:20 PM    IMMGRANAB 0.03 05/01/2021 10:20 PM    NRBCAB 0.00 05/01/2021 10:20 PM        CBC w/o DIFF  Lab Results   Component Value Date/Time    WBC 5.4 05/01/2021 10:20 PM    RBC 3.84 (L) 05/01/2021 10:20 PM    HEMOGLOBIN 13.2 (L) 05/01/2021 10:20 PM    .4 (H) 05/01/2021 10:20 PM    MCH 34.4 (H) 05/01/2021 10:20 PM    MCHC 33.2 (L) 05/01/2021 10:20 PM    RDW 53.7 (H) 05/01/2021 10:20 PM    MPV 9.0 05/01/2021 10:20 PM         EKG interpreted by me: Sinus bradycardia with right bundle branch block    Impression/Plan:  1.  Typical atrial flutter  2.  Oral anticoagulation management, Xarelto    -He has not had recurrence of atrial flutter after spontaneous cardioversion, per his report, over the past several months.  He is wondering if he needs an ablation still.  I told him I do not know if and when he will have atrial flutter come back.  We discussed that we could discontinue Xarelto, however if he had atrial flutter return, his risk for stroke would be elevated.  As such, he wishes to pursue ambulatory monitoring.  We discussed loop recorder implantation to screen for atrial fibrillation or flutter recurrence.  He would like to proceed with this.  Risks and benefits were discussed.  Schedule loop recorder implantation.  -If loop recorder reveals recurrence of atrial flutter, at this point he would want to pursue atrial flutter ablation, he would resume Xarelto at this time and plan to continue until flutter ablation was scheduled.    Follow-up in 6 months    Elías Merino MD  Cardiac Electrophysiology

## 2021-05-26 ENCOUNTER — TELEPHONE (OUTPATIENT)
Dept: CARDIOLOGY | Facility: MEDICAL CENTER | Age: 71
End: 2021-05-26

## 2021-05-26 DIAGNOSIS — I48.4 ATYPICAL ATRIAL FLUTTER (HCC): ICD-10-CM

## 2021-05-26 NOTE — TELEPHONE ENCOUNTER
Patient scheduled for loop insert on 6-4-21 with Dr. Merino. Patient has been instructed to check in 11:00 for 1:00 case time. Hold Xarelto 3 days before. Message sent to domingo Wahl with [employer] notified.

## 2021-05-26 NOTE — TELEPHONE ENCOUNTER
----- Message from Elías Merino M.D. sent at 5/25/2021  1:43 PM PDT -----  Please schedule ILR implant, hold Xarelto 3 days, thanks  [facility]

## 2021-06-02 ENCOUNTER — HOSPITAL ENCOUNTER (OUTPATIENT)
Facility: MEDICAL CENTER | Age: 71
End: 2021-06-02
Attending: PHYSICIAN ASSISTANT
Payer: MEDICARE

## 2021-06-02 PROCEDURE — 87086 URINE CULTURE/COLONY COUNT: CPT

## 2021-06-02 PROCEDURE — 87077 CULTURE AEROBIC IDENTIFY: CPT | Mod: 91

## 2021-06-02 PROCEDURE — 87186 SC STD MICRODIL/AGAR DIL: CPT

## 2021-06-03 ENCOUNTER — PRE-ADMISSION TESTING (OUTPATIENT)
Dept: ADMISSIONS | Facility: MEDICAL CENTER | Age: 71
End: 2021-06-03
Attending: INTERNAL MEDICINE
Payer: MEDICARE

## 2021-06-03 RX ORDER — AMLODIPINE BESYLATE 10 MG/1
10 TABLET ORAL DAILY
COMMUNITY
End: 2022-04-14 | Stop reason: SDUPTHER

## 2021-06-04 ENCOUNTER — HOSPITAL ENCOUNTER (OUTPATIENT)
Facility: MEDICAL CENTER | Age: 71
End: 2021-06-04
Attending: INTERNAL MEDICINE | Admitting: INTERNAL MEDICINE
Payer: MEDICARE

## 2021-06-04 ENCOUNTER — APPOINTMENT (OUTPATIENT)
Dept: CARDIOLOGY | Facility: MEDICAL CENTER | Age: 71
End: 2021-06-04
Attending: INTERNAL MEDICINE
Payer: MEDICARE

## 2021-06-04 VITALS
SYSTOLIC BLOOD PRESSURE: 131 MMHG | TEMPERATURE: 97.6 F | RESPIRATION RATE: 16 BRPM | HEIGHT: 70 IN | OXYGEN SATURATION: 95 % | DIASTOLIC BLOOD PRESSURE: 58 MMHG | WEIGHT: 195.33 LBS | HEART RATE: 48 BPM | BODY MASS INDEX: 27.96 KG/M2

## 2021-06-04 DIAGNOSIS — I48.4 ATYPICAL ATRIAL FLUTTER (HCC): ICD-10-CM

## 2021-06-04 PROCEDURE — 700101 HCHG RX REV CODE 250

## 2021-06-04 PROCEDURE — 33285 INSJ SUBQ CAR RHYTHM MNTR: CPT | Performed by: INTERNAL MEDICINE

## 2021-06-04 PROCEDURE — 160002 HCHG RECOVERY MINUTES (STAT)

## 2021-06-04 PROCEDURE — 33285 INSJ SUBQ CAR RHYTHM MNTR: CPT

## 2021-06-04 RX ORDER — LIDOCAINE HYDROCHLORIDE AND EPINEPHRINE 10; 10 MG/ML; UG/ML
INJECTION, SOLUTION INFILTRATION; PERINEURAL
Status: COMPLETED
Start: 2021-06-04 | End: 2021-06-04

## 2021-06-04 RX ADMIN — LIDOCAINE HYDROCHLORIDE,EPINEPHRINE BITARTRATE 8 ML: 10; .01 INJECTION, SOLUTION INFILTRATION; PERINEURAL at 12:44

## 2021-06-04 ASSESSMENT — FIBROSIS 4 INDEX: FIB4 SCORE: 1.83

## 2021-06-04 NOTE — OR NURSING
1315 Pt arrived to [facility] with Cath lab RN. Joy. VSS. Denies pain and nausea. Left chest dressing is CDI and soft. Belongings returned to pt bedside.     1330 Pt dressed, PIV out, transferred to personal wheelchair via Yoko lift.     1340 Discharge instructions reviewed with pt. All questions answered.     1345 Pt taken out to transport service vehicle, all belongings with pt.

## 2021-06-04 NOTE — DISCHARGE INSTRUCTIONS
ACTIVITY: Rest and take it easy for the first 24 hours.  A responsible adult is recommended to remain with you during that time.  It is normal to feel sleepy.  We encourage you to not do anything that requires balance, judgment or coordination.    MILD FLU-LIKE SYMPTOMS ARE NORMAL. YOU MAY EXPERIENCE GENERALIZED MUSCLE ACHES, THROAT IRRITATION, HEADACHE AND/OR SOME NAUSEA.    FOR 24 HOURS DO NOT:  Drive, operate machinery or run household appliances.  Drink beer or alcoholic beverages.   Make important decisions or sign legal documents.    SPECIAL INSTRUCTIONS: Implantable Loop Recorder Placement, Care After  This sheet gives you information about how to care for yourself after your procedure. Your health care provider may also give you more specific instructions. If you have problems or questions, contact your health care provider.  What can I expect after the procedure?  After the procedure, it is common to have:  · Soreness or discomfort near the incision.  · Some swelling or bruising near the incision.  Follow these instructions at home:  Incision care  · Follow instructions from your health care provider about how to take care of your incision. Make sure you:  ? Wash your hands with soap and water before you change your bandage (dressing). If soap and water are not available, use hand .  ? Change your dressing as told by your health care provider.  ? Keep your dressing dry.  ? Leave stitches (sutures), skin glue, or adhesive strips in place. These skin closures may need to stay in place for 2 weeks or longer. If adhesive strip edges start to loosen and curl up, you may trim the loose edges. Do not remove adhesive strips completely unless your health care provider tells you to do that.  · Check your incision area every day for signs of infection. Check for:  ? Redness, swelling, or pain.  ? Fluid or blood.  ? Warmth.  ? Pus or a bad smell.  · Do not take baths, swim, or use a hot tub until your health  care provider approves. Ask your health care provider if you can take showers.  Activity  · Return to your normal activities as told by your health care provider. Ask your health care provider what activities are safe for you.  · Do not drive for 24 hours if you were given a sedative during your procedure.  General instructions  · Follow instructions from your health care provider about how to manage your implantable loop recorder and transmit the information. Learn how to activate a recording if this is necessary for your type of device.  · Do not go through a metal detection gate, and do not let someone hold a metal detector over your chest. Show your ID card.  · Do not have an MRI unless you check with your health care provider first.  · Take over-the-counter and prescription medicines only as told by your health care provider.  · Keep all follow-up visits as told by your health care provider. This is important.  Contact a health care provider if:  · You have redness, swelling, or pain around your incision.  · You have a fever.  · You have pain that is not relieved by your pain medicine.  · You have triggered your device because of fainting (syncope) or because of a heartbeat that feels like it is racing, slow, fluttering, or skipping (palpitations).  Get help right away if you have:  · Chest pain.  · Difficulty breathing.  Summary  · After the procedure, it is common to have soreness or discomfort near the incision.  · Change your dressing as told by your health care provider.  · Follow instructions from your health care provider about how to manage your implantable loop recorder and transmit the information.  · Keep all follow-up visits as told by your health care provider. This is important.    DIET: To avoid nausea, slowly advance diet as tolerated, avoiding spicy or greasy foods for the first day.  Add more substantial food to your diet according to your physician's instructions.  Babies can be fed formula  or breast milk as soon as they are hungry.  INCREASE FLUIDS AND FIBER TO AVOID CONSTIPATION.    SURGICAL DRESSING/BATHING: Keep dressing dry for 1 week until follow-up appointment. Sponge bath only. Do not submerge in water or bath for 7 days.    FOLLOW-UP APPOINTMENT:  A follow-up appointment should be arranged with your doctor in 1 week with [facility] [phone]; call to schedule.    You should CALL YOUR PHYSICIAN if you develop:  Fever greater than 101 degrees F.  Pain not relieved by medication, or persistent nausea or vomiting.  Excessive bleeding (blood soaking through dressing) or unexpected drainage from the wound.  Extreme redness or swelling around the incision site, drainage of pus or foul smelling drainage.  Inability to urinate or empty your bladder within 8 hours.  Problems with breathing or chest pain.    You should call 911 if you develop problems with breathing or chest pain.  If you are unable to contact your doctor or surgical center, you should go to the nearest emergency room or urgent care center.  Physician's telephone #: [phone]    If any questions arise, call your doctor.  If your doctor is not available, please feel free to call the Surgical Center at [phone]. The Contact Center is open Monday through Friday 7AM to 5PM and may speak to a nurse at [phone], or toll free at [phone].     A registered nurse may call you a few days after your surgery to see how you are doing after your procedure.    MEDICATIONS: Resume taking daily medication.  Take prescribed pain medication with food.  If no medication is prescribed, you may take non-aspirin pain medication if needed.  PAIN MEDICATION CAN BE VERY CONSTIPATING.  Take a stool softener or laxative such as senokot, pericolace, or milk of magnesia if needed.    If your physician has prescribed pain medication that includes Acetaminophen (Tylenol), do not take additional Acetaminophen (Tylenol) while taking the prescribed  medication.    Depression / Suicide Risk    As you are discharged from this [facility] facility, it is important to learn how to keep safe from harming yourself.    Recognize the warning signs:  · Abrupt changes in personality, positive or negative- including increase in energy   · Giving away possessions  · Change in eating patterns- significant weight changes-  positive or negative  · Change in sleeping patterns- unable to sleep or sleeping all the time   · Unwillingness or inability to communicate  · Depression  · Unusual sadness, discouragement and loneliness  · Talk of wanting to die  · Neglect of personal appearance   · Rebelliousness- reckless behavior  · Withdrawal from people/activities they love  · Confusion- inability to concentrate     If you or a loved one observes any of these behaviors or has concerns about self-harm, here's what you can do:  · Talk about it- your feelings and reasons for harming yourself  · Remove any means that you might use to hurt yourself (examples: pills, rope, extension cords, firearm)  · Get professional help from the community (Mental Health, Substance Abuse, psychological counseling)  · Do not be alone:Call your Safe Contact- someone whom you trust who will be there for you.  · Call your local CRISIS HOTLINE 865-9667 or 596-163-1960  · Call your local Children's Mobile Crisis Response Team [place] (146) 410-5741 or www.[employer]  · Call the toll free National Suicide Prevention Hotlines   · National Suicide Prevention Lifeline 964-576-AXZJ (5059)  · National Hope Line Network 800-SUICIDE (584-8284)

## 2021-06-04 NOTE — OP REPORT
PROCEDURE PERFORMED: Implantable Loop Recorder    DATE OF SERVICE: 6/4/2021    : Elías Merino MD    ASSISTANT: None    ANESTHESIA: Local    EBL: <5 cc    SPECIMENS: None    STATEMENT OF MEDICAL NECESSITY:  Atrial flutter with questionable recurrence    DESCRIPTION OF PROCEDURE:  After informed written consent, the patient was brought to the cath lab pre/post procedure area. The patient was prepped and draped in the usual sterile fashion. The procedure was performed with local anesthetic. Using the supplied incision tool, a <1 cm incision was made in the skin about 2 cm leftward and lateral to the sternal border. Using the supplied insertion tool, a tunnel was made in the subcutaneous tissue at a 45 degree angle to the sternum and the device was inserted with the supplied plunger. Manual compression was used until hemostasis achieved. A single 4-0 vicryl suture was used to appose the skin. Dermabond was applied to the incision site. Sterile dressing was applied.    IMPLANTED DEVICE INFORMATION:  Model: [employer] LNQ11  Serial number:  [id number]    IMPRESSIONS:  1. Successful implantable loop recorder implantation    RECOMMENDATIONS:  1. Routine follow-up and device interrogation

## 2021-06-07 ENCOUNTER — TELEPHONE (OUTPATIENT)
Dept: CARDIOLOGY | Facility: MEDICAL CENTER | Age: 71
End: 2021-06-07

## 2021-06-07 NOTE — LETTER
June 8, 2021        Osvaldo Pate  [id number] [facility] [address]        To Whom it may concern:    At this time, Anjum Pate can stop taking his daily 20mg Xarelto.    If you have any questions or concerns, please don't hesitate to call.        Sincerely,        Douglas Briceno RN on Behalf of Elías Merino M.D.    Electronically Signed

## 2021-06-07 NOTE — TELEPHONE ENCOUNTER
Patient called and stated he needs a DC order sent to the home he is in for his Xarelto. The facility wants to make sure before they stop it.. Please fax to [phone].    Thank you,    Phoebe PHAM

## 2021-06-08 PROBLEM — N31.9 NEUROGENIC BLADDER: Status: RESOLVED | Noted: 2019-07-24 | Resolved: 2021-06-08

## 2021-06-08 NOTE — TELEPHONE ENCOUNTER
You  Elías Merino M.D. 18 hours ago (1:47 PM)     To , please advise, ok to DC Xarelto now that loop recorder has been placed?   Thank you     Message text      Elías Merino M.D.  You 10 hours ago (10:12 PM)     Yes, DC Xarelto for now      Faxed to [facility] [phone], transmission complete

## 2021-06-11 ENCOUNTER — NON-PROVIDER VISIT (OUTPATIENT)
Dept: CARDIOLOGY | Facility: MEDICAL CENTER | Age: 71
End: 2021-06-11
Payer: MEDICARE

## 2021-06-11 DIAGNOSIS — Z45.09 ENCOUNTER FOR LOOP RECORDER CHECK: ICD-10-CM

## 2021-06-11 DIAGNOSIS — I48.92 ATRIAL FLUTTER, UNSPECIFIED TYPE (HCC): ICD-10-CM

## 2021-06-11 PROCEDURE — 93298 REM INTERROG DEV EVAL SCRMS: CPT | Performed by: INTERNAL MEDICINE

## 2021-07-08 ENCOUNTER — HOSPITAL ENCOUNTER (OUTPATIENT)
Facility: MEDICAL CENTER | Age: 71
End: 2021-07-08
Attending: PHYSICIAN ASSISTANT
Payer: MEDICARE

## 2021-07-08 ENCOUNTER — NON-PROVIDER VISIT (OUTPATIENT)
Dept: CARDIOLOGY | Facility: MEDICAL CENTER | Age: 71
End: 2021-07-08
Payer: MEDICARE

## 2021-07-08 DIAGNOSIS — I48.92 PAROXYSMAL ATRIAL FLUTTER (HCC): ICD-10-CM

## 2021-07-08 DIAGNOSIS — Z95.818 STATUS POST PLACEMENT OF IMPLANTABLE LOOP RECORDER: ICD-10-CM

## 2021-07-08 PROCEDURE — 87086 URINE CULTURE/COLONY COUNT: CPT

## 2021-07-08 PROCEDURE — 87077 CULTURE AEROBIC IDENTIFY: CPT

## 2021-07-08 PROCEDURE — 87186 SC STD MICRODIL/AGAR DIL: CPT

## 2021-07-08 PROCEDURE — 93298 REM INTERROG DEV EVAL SCRMS: CPT | Performed by: INTERNAL MEDICINE

## 2021-08-06 ENCOUNTER — HOSPITAL ENCOUNTER (OUTPATIENT)
Facility: MEDICAL CENTER | Age: 71
End: 2021-08-06
Attending: PHYSICIAN ASSISTANT
Payer: MEDICARE

## 2021-08-06 PROCEDURE — 80048 BASIC METABOLIC PNL TOTAL CA: CPT

## 2021-08-06 PROCEDURE — 87086 URINE CULTURE/COLONY COUNT: CPT

## 2021-08-07 LAB
ANION GAP SERPL CALC-SCNC: 14 MMOL/L (ref 7–16)
BUN SERPL-MCNC: 14 MG/DL (ref 8–22)
CALCIUM SERPL-MCNC: 9.1 MG/DL (ref 8.5–10.5)
CHLORIDE SERPL-SCNC: 104 MMOL/L (ref 96–112)
CO2 SERPL-SCNC: 21 MMOL/L (ref 20–33)
CREAT SERPL-MCNC: 0.51 MG/DL (ref 0.5–1.4)
GLUCOSE SERPL-MCNC: 80 MG/DL (ref 65–99)
POTASSIUM SERPL-SCNC: 4.2 MMOL/L (ref 3.6–5.5)
SODIUM SERPL-SCNC: 139 MMOL/L (ref 135–145)

## 2021-08-09 LAB
BACTERIA UR CULT: NORMAL
SIGNIFICANT IND 70042: NORMAL
SITE SITE: NORMAL
SOURCE SOURCE: NORMAL

## 2021-08-10 ENCOUNTER — NON-PROVIDER VISIT (OUTPATIENT)
Dept: CARDIOLOGY | Facility: MEDICAL CENTER | Age: 71
End: 2021-08-10
Payer: MEDICARE

## 2021-08-10 DIAGNOSIS — Z95.818 STATUS POST PLACEMENT OF IMPLANTABLE LOOP RECORDER: ICD-10-CM

## 2021-08-10 DIAGNOSIS — I48.92 PAROXYSMAL ATRIAL FLUTTER (HCC): ICD-10-CM

## 2021-08-10 PROCEDURE — 99999 PR NO CHARGE: CPT | Performed by: INTERNAL MEDICINE

## 2021-09-14 ENCOUNTER — NON-PROVIDER VISIT (OUTPATIENT)
Dept: CARDIOLOGY | Facility: MEDICAL CENTER | Age: 71
End: 2021-09-14
Payer: MEDICARE

## 2021-09-14 DIAGNOSIS — I48.92 PAROXYSMAL ATRIAL FLUTTER (HCC): ICD-10-CM

## 2021-09-14 DIAGNOSIS — Z95.818 STATUS POST PLACEMENT OF IMPLANTABLE LOOP RECORDER: ICD-10-CM

## 2021-09-14 PROCEDURE — 99999 PR NO CHARGE: CPT | Performed by: INTERNAL MEDICINE

## 2021-10-15 ENCOUNTER — NON-PROVIDER VISIT (OUTPATIENT)
Dept: CARDIOLOGY | Facility: MEDICAL CENTER | Age: 71
End: 2021-10-15
Payer: MEDICARE

## 2021-10-15 DIAGNOSIS — I48.92 PAROXYSMAL ATRIAL FLUTTER (HCC): ICD-10-CM

## 2021-10-15 DIAGNOSIS — Z95.818 STATUS POST PLACEMENT OF IMPLANTABLE LOOP RECORDER: ICD-10-CM

## 2021-10-15 PROCEDURE — 99999 PR NO CHARGE: CPT | Performed by: INTERNAL MEDICINE

## 2021-10-23 ENCOUNTER — HOSPITAL ENCOUNTER (INPATIENT)
Facility: MEDICAL CENTER | Age: 71
LOS: 3 days | DRG: 698 | End: 2021-10-26
Attending: EMERGENCY MEDICINE | Admitting: INTERNAL MEDICINE
Payer: MEDICARE

## 2021-10-23 ENCOUNTER — APPOINTMENT (OUTPATIENT)
Dept: RADIOLOGY | Facility: MEDICAL CENTER | Age: 71
DRG: 698 | End: 2021-10-23
Attending: EMERGENCY MEDICINE
Payer: MEDICARE

## 2021-10-23 DIAGNOSIS — I48.92 PAROXYSMAL ATRIAL FLUTTER (HCC): ICD-10-CM

## 2021-10-23 DIAGNOSIS — G82.53 QUADRIPLEGIA, C5-C7 COMPLETE (HCC): ICD-10-CM

## 2021-10-23 DIAGNOSIS — N39.0 ACUTE UTI: ICD-10-CM

## 2021-10-23 DIAGNOSIS — N31.9 NEUROGENIC BLADDER: ICD-10-CM

## 2021-10-23 DIAGNOSIS — R50.9 FEVER, UNSPECIFIED FEVER CAUSE: ICD-10-CM

## 2021-10-23 DIAGNOSIS — N30.00 ACUTE CYSTITIS WITHOUT HEMATURIA: ICD-10-CM

## 2021-10-23 DIAGNOSIS — G82.50 CHRONIC INCOMPLETE QUADRIPLEGIA (HCC): ICD-10-CM

## 2021-10-23 DIAGNOSIS — Z93.3 S/P COLOSTOMY (HCC): ICD-10-CM

## 2021-10-23 DIAGNOSIS — L89.154 PRESSURE INJURY OF SACRAL REGION, STAGE 4 (HCC): ICD-10-CM

## 2021-10-23 LAB
ALBUMIN SERPL BCP-MCNC: 3.4 G/DL (ref 3.2–4.9)
ALBUMIN/GLOB SERPL: 1.2 G/DL
ALP SERPL-CCNC: 77 U/L (ref 30–99)
ALT SERPL-CCNC: 9 U/L (ref 2–50)
ANION GAP SERPL CALC-SCNC: 11 MMOL/L (ref 7–16)
APPEARANCE UR: ABNORMAL
AST SERPL-CCNC: 11 U/L (ref 12–45)
BACTERIA #/AREA URNS HPF: ABNORMAL /HPF
BASOPHILS # BLD AUTO: 0.1 % (ref 0–1.8)
BASOPHILS # BLD: 0.01 K/UL (ref 0–0.12)
BILIRUB SERPL-MCNC: 0.9 MG/DL (ref 0.1–1.5)
BILIRUB UR QL STRIP.AUTO: NEGATIVE
BUN SERPL-MCNC: 12 MG/DL (ref 8–22)
CALCIUM SERPL-MCNC: 8.6 MG/DL (ref 8.4–10.2)
CHLORIDE SERPL-SCNC: 105 MMOL/L (ref 96–112)
CO2 SERPL-SCNC: 21 MMOL/L (ref 20–33)
COLOR UR: YELLOW
CREAT SERPL-MCNC: 0.61 MG/DL (ref 0.5–1.4)
EOSINOPHIL # BLD AUTO: 0.01 K/UL (ref 0–0.51)
EOSINOPHIL NFR BLD: 0.1 % (ref 0–6.9)
ERYTHROCYTE [DISTWIDTH] IN BLOOD BY AUTOMATED COUNT: 52.4 FL (ref 35.9–50)
GLOBULIN SER CALC-MCNC: 2.9 G/DL (ref 1.9–3.5)
GLUCOSE SERPL-MCNC: 124 MG/DL (ref 65–99)
GLUCOSE UR STRIP.AUTO-MCNC: NEGATIVE MG/DL
HCT VFR BLD AUTO: 39.6 % (ref 42–52)
HGB BLD-MCNC: 13.2 G/DL (ref 14–18)
IMM GRANULOCYTES # BLD AUTO: 0.09 K/UL (ref 0–0.11)
IMM GRANULOCYTES NFR BLD AUTO: 0.8 % (ref 0–0.9)
KETONES UR STRIP.AUTO-MCNC: NEGATIVE MG/DL
LACTATE BLD-SCNC: 1.4 MMOL/L (ref 0.5–2)
LACTATE BLD-SCNC: 1.5 MMOL/L (ref 0.5–2)
LEUKOCYTE ESTERASE UR QL STRIP.AUTO: ABNORMAL
LYMPHOCYTES # BLD AUTO: 1.05 K/UL (ref 1–4.8)
LYMPHOCYTES NFR BLD: 9.4 % (ref 22–41)
MCH RBC QN AUTO: 35.3 PG (ref 27–33)
MCHC RBC AUTO-ENTMCNC: 33.3 G/DL (ref 33.7–35.3)
MCV RBC AUTO: 105.9 FL (ref 81.4–97.8)
MICRO URNS: ABNORMAL
MONOCYTES # BLD AUTO: 1.02 K/UL (ref 0–0.85)
MONOCYTES NFR BLD AUTO: 9.1 % (ref 0–13.4)
MUCOUS THREADS #/AREA URNS HPF: ABNORMAL /HPF
NEUTROPHILS # BLD AUTO: 9.02 K/UL (ref 1.82–7.42)
NEUTROPHILS NFR BLD: 80.5 % (ref 44–72)
NITRITE UR QL STRIP.AUTO: POSITIVE
NRBC # BLD AUTO: 0 K/UL
NRBC BLD-RTO: 0 /100 WBC
PH UR STRIP.AUTO: 7.5 [PH] (ref 5–8)
PLATELET # BLD AUTO: 133 K/UL (ref 164–446)
PMV BLD AUTO: 9.3 FL (ref 9–12.9)
POTASSIUM SERPL-SCNC: 3.8 MMOL/L (ref 3.6–5.5)
PROCALCITONIN SERPL-MCNC: 0.32 NG/ML
PROT SERPL-MCNC: 6.3 G/DL (ref 6–8.2)
PROT UR QL STRIP: 100 MG/DL
RBC # BLD AUTO: 3.74 M/UL (ref 4.7–6.1)
RBC # URNS HPF: ABNORMAL /HPF
RBC UR QL AUTO: ABNORMAL
SODIUM SERPL-SCNC: 137 MMOL/L (ref 135–145)
SP GR UR STRIP.AUTO: 1.02
UNIDENT CRYS URNS QL MICRO: ABNORMAL /HPF
WBC # BLD AUTO: 11.2 K/UL (ref 4.8–10.8)
WBC #/AREA URNS HPF: ABNORMAL /HPF

## 2021-10-23 PROCEDURE — A9270 NON-COVERED ITEM OR SERVICE: HCPCS | Performed by: INTERNAL MEDICINE

## 2021-10-23 PROCEDURE — 83605 ASSAY OF LACTIC ACID: CPT

## 2021-10-23 PROCEDURE — 700102 HCHG RX REV CODE 250 W/ 637 OVERRIDE(OP): Performed by: INTERNAL MEDICINE

## 2021-10-23 PROCEDURE — 87040 BLOOD CULTURE FOR BACTERIA: CPT

## 2021-10-23 PROCEDURE — 87186 SC STD MICRODIL/AGAR DIL: CPT

## 2021-10-23 PROCEDURE — 99223 1ST HOSP IP/OBS HIGH 75: CPT | Mod: AI | Performed by: INTERNAL MEDICINE

## 2021-10-23 PROCEDURE — 80053 COMPREHEN METABOLIC PANEL: CPT

## 2021-10-23 PROCEDURE — 99285 EMERGENCY DEPT VISIT HI MDM: CPT

## 2021-10-23 PROCEDURE — 85025 COMPLETE CBC W/AUTO DIFF WBC: CPT

## 2021-10-23 PROCEDURE — 84145 PROCALCITONIN (PCT): CPT

## 2021-10-23 PROCEDURE — 700111 HCHG RX REV CODE 636 W/ 250 OVERRIDE (IP): Performed by: EMERGENCY MEDICINE

## 2021-10-23 PROCEDURE — 87077 CULTURE AEROBIC IDENTIFY: CPT

## 2021-10-23 PROCEDURE — 770006 HCHG ROOM/CARE - MED/SURG/GYN SEMI*

## 2021-10-23 PROCEDURE — 700111 HCHG RX REV CODE 636 W/ 250 OVERRIDE (IP): Performed by: INTERNAL MEDICINE

## 2021-10-23 PROCEDURE — 700105 HCHG RX REV CODE 258: Performed by: INTERNAL MEDICINE

## 2021-10-23 PROCEDURE — 700105 HCHG RX REV CODE 258: Performed by: EMERGENCY MEDICINE

## 2021-10-23 PROCEDURE — 71045 X-RAY EXAM CHEST 1 VIEW: CPT

## 2021-10-23 PROCEDURE — 81001 URINALYSIS AUTO W/SCOPE: CPT

## 2021-10-23 PROCEDURE — 36415 COLL VENOUS BLD VENIPUNCTURE: CPT

## 2021-10-23 PROCEDURE — 96365 THER/PROPH/DIAG IV INF INIT: CPT

## 2021-10-23 PROCEDURE — 87086 URINE CULTURE/COLONY COUNT: CPT

## 2021-10-23 RX ORDER — BACLOFEN 10 MG/1
20 TABLET ORAL 4 TIMES DAILY
Status: DISCONTINUED | OUTPATIENT
Start: 2021-10-23 | End: 2021-10-26 | Stop reason: HOSPADM

## 2021-10-23 RX ORDER — BISACODYL 10 MG
10 SUPPOSITORY, RECTAL RECTAL
Status: DISCONTINUED | OUTPATIENT
Start: 2021-10-23 | End: 2021-10-26 | Stop reason: HOSPADM

## 2021-10-23 RX ORDER — DOCUSATE SODIUM 100 MG/1
100 CAPSULE, LIQUID FILLED ORAL 2 TIMES DAILY
Status: DISCONTINUED | OUTPATIENT
Start: 2021-10-23 | End: 2021-10-24

## 2021-10-23 RX ORDER — ONDANSETRON 4 MG/1
4 TABLET, ORALLY DISINTEGRATING ORAL EVERY 4 HOURS PRN
Status: DISCONTINUED | OUTPATIENT
Start: 2021-10-23 | End: 2021-10-26 | Stop reason: HOSPADM

## 2021-10-23 RX ORDER — AMLODIPINE BESYLATE 5 MG/1
10 TABLET ORAL DAILY
Status: DISCONTINUED | OUTPATIENT
Start: 2021-10-24 | End: 2021-10-26 | Stop reason: HOSPADM

## 2021-10-23 RX ORDER — ACETAMINOPHEN 325 MG/1
650 TABLET ORAL EVERY 6 HOURS PRN
Status: DISCONTINUED | OUTPATIENT
Start: 2021-10-23 | End: 2021-10-26 | Stop reason: HOSPADM

## 2021-10-23 RX ORDER — POLYETHYLENE GLYCOL 3350 17 G/17G
1 POWDER, FOR SOLUTION ORAL
Status: DISCONTINUED | OUTPATIENT
Start: 2021-10-23 | End: 2021-10-26 | Stop reason: HOSPADM

## 2021-10-23 RX ORDER — CEPHALEXIN 250 MG/1
250 CAPSULE ORAL DAILY
Status: ON HOLD | COMMUNITY
End: 2021-10-26

## 2021-10-23 RX ORDER — ONDANSETRON 2 MG/ML
4 INJECTION INTRAMUSCULAR; INTRAVENOUS EVERY 4 HOURS PRN
Status: DISCONTINUED | OUTPATIENT
Start: 2021-10-23 | End: 2021-10-26 | Stop reason: HOSPADM

## 2021-10-23 RX ORDER — DOCUSATE SODIUM 100 MG/1
100 CAPSULE, LIQUID FILLED ORAL 2 TIMES DAILY
Status: DISCONTINUED | OUTPATIENT
Start: 2021-10-23 | End: 2021-10-23

## 2021-10-23 RX ORDER — AMOXICILLIN 250 MG
2 CAPSULE ORAL 2 TIMES DAILY
Status: DISCONTINUED | OUTPATIENT
Start: 2021-10-23 | End: 2021-10-23

## 2021-10-23 RX ORDER — CHOLECALCIFEROL (VITAMIN D3) 125 MCG
10 CAPSULE ORAL
Status: DISCONTINUED | OUTPATIENT
Start: 2021-10-23 | End: 2021-10-26 | Stop reason: HOSPADM

## 2021-10-23 RX ADMIN — SENNOSIDES AND DOCUSATE SODIUM 2 TABLET: 8.6; 5 TABLET ORAL at 18:03

## 2021-10-23 RX ADMIN — PIPERACILLIN AND TAZOBACTAM 4.5 G: 4; .5 INJECTION, POWDER, LYOPHILIZED, FOR SOLUTION INTRAVENOUS; PARENTERAL at 13:54

## 2021-10-23 RX ADMIN — Medication 10 MG: at 22:59

## 2021-10-23 RX ADMIN — ENOXAPARIN SODIUM 40 MG: 40 INJECTION SUBCUTANEOUS at 16:27

## 2021-10-23 RX ADMIN — BACLOFEN 20 MG: 10 TABLET ORAL at 16:27

## 2021-10-23 RX ADMIN — DOCUSATE SODIUM 100 MG: 100 CAPSULE, LIQUID FILLED ORAL at 20:31

## 2021-10-23 RX ADMIN — PIPERACILLIN AND TAZOBACTAM 4.5 G: 4; .5 INJECTION, POWDER, LYOPHILIZED, FOR SOLUTION INTRAVENOUS; PARENTERAL at 16:26

## 2021-10-23 RX ADMIN — BACLOFEN 20 MG: 10 TABLET ORAL at 20:31

## 2021-10-23 ASSESSMENT — PAIN DESCRIPTION - PAIN TYPE
TYPE: ACUTE PAIN
TYPE: ACUTE PAIN

## 2021-10-23 ASSESSMENT — FIBROSIS 4 INDEX: FIB4 SCORE: 1.96

## 2021-10-23 ASSESSMENT — ENCOUNTER SYMPTOMS
TINGLING: 1
SHORTNESS OF BREATH: 0
VOMITING: 0
FOCAL WEAKNESS: 1
NAUSEA: 0
DIARRHEA: 1
FEVER: 1
ABDOMINAL PAIN: 1
BACK PAIN: 0

## 2021-10-23 NOTE — ED PROVIDER NOTES
"ED Provider Note    CHIEF COMPLAINT  Chief Complaint   Patient presents with   • Fever     pt reports fever started this AM, T-max 100.7 at home.        HPI  Osvaldo Pate is a 71 y.o. quadriplegic male with history of A. fib/flutter, GERD, and hypertension who presents complaining of fever.  Patient was noted to have a temperature of 100.7 at his group home this morning.  He reports chills last evening.  Patient has a history of UTIs and suspects this is the source today.  Patient states he felt full earlier than usual during his meal last night and is unsure if this may indicate that \"something is going on in my gut\" but the quadriplegia makes it hard to tell.    Patient has a suprapubic catheter that was placed in April of this year.  His last UTI led to sepsis in 2020.    Patient denies cough, nausea, vomiting, diarrhea, rash, chest pain, and shortness of breath.      ALLERGIES  Allergies   Allergen Reactions   • Sulfa Drugs Rash     Rash         CURRENT MEDICATIONS  Home Medications     Reviewed by Humberto Hayden (Pharmacy Tech) on 10/23/21 at 1306  Med List Status: Complete   Medication Last Dose Status   acetaminophen (TYLENOL) 500 MG Tab 10/23/2021 Active   amLODIPine (NORVASC) 10 MG Tab 10/23/2021 Active   ascorbic acid (ASCORBIC ACID) 500 MG Tab 10/23/2021 Active   baclofen (LIORESAL) 20 MG tablet 10/23/2021 Active   cephALEXin (KEFLEX) 250 MG Cap 10/22/2021 Active   Cholecalciferol (VITAMIN D) 2000 UNIT Tab 10/23/2021 Active   docusate sodium (COLACE) 100 MG Cap 10/23/2021 Active   ferrous sulfate 325 (65 Fe) MG tablet 10/23/2021 Active   losartan (COZAAR) 50 MG Tab 10/22/2021 Active   magnesium oxide (MAG-OX) 400 MG Tab tablet 10/23/2021 Active   melatonin 5 mg Tab 10/22/2021 Active   Non Formulary Request 10/23/2021 Active   polyethylene glycol/lytes (MIRALAX) 17 g Pack 10/23/2021 Active   Probiotic Product (PROBIOTIC PO) 10/23/2021 Active   sennosides (SENOKOT) 8.6 MG Tab 10/23/2021 Active " "  therapeutic multivitamin-minerals (THERAGRAN-M) Tab 10/23/2021 Active   zinc sulfate (ZINCATE) 220 (50 Zn) MG Cap 10/23/2021 Active                PAST MEDICAL HISTORY   has a past medical history of A-fib (HCC), Atrial flutter (HCC), Blood clotting disorder (HCC), Bowel habit changes, GERD (gastroesophageal reflux disease), Hypertension, Neurogenic bladder, and Quadriplegia, C5-C7 complete (HCC).    SURGICAL HISTORY   has a past surgical history that includes percutaneouospinning lower extremity; colostomy; colostomy takedown; colostomy (N/A, 2019); ulcer debridement (N/A, 2019); flap closure (2019); hernia repair; and irrigation & debridement general (2020).    SOCIAL HISTORY  Social History     Tobacco Use   • Smoking status: Former Smoker     Packs/day: 1.00     Types: Cigarettes     Start date: 1967     Quit date: 1977     Years since quittin.8   • Smokeless tobacco: Never Used   Vaping Use   • Vaping Use: Never used   Substance and Sexual Activity   • Alcohol use: Yes     Alcohol/week: 0.6 oz     Types: 1 Standard drinks or equivalent per week     Comment: nightly   • Drug use: No   • Sexual activity: Not on file       Family Hx:  HTN      REVIEW OF SYSTEMS  See HPI for further details.  All other systems are negative except as above in HPI.    PHYSICAL EXAM  VITAL SIGNS: /65   Pulse 75   Temp 37.9 °C (100.2 °F) (Oral)   Resp 18   Ht 1.778 m (5' 10\")   SpO2 91%   BMI 27.98 kg/m²     General:  WN male, nontoxic appearing in NAD; A+Ox3; V/S as above; temperature 100.2  Skin: warm and dry; good color; no rash  HEENT: NCAT; EOMs intact; PERRL; no scleral icterus   Neck: FROM; no meningismus  Cardiovascular: Regular heart rate and rhythm.  No murmurs, rubs, or gallops; pulses 2+ bilaterally radially  Lungs: Clear to auscultation with good air movement bilaterally.  No wheezes, rhonchi, or rales.   Abdomen: BS present; soft; NTND; no rebound, guarding, or rigidity.  No " organomegaly or pulsatile mass  Extremities: Atrophied extremities; contracted hands but able to move upper extremities to some degree; no e/o trauma; no pedal edema  Neurologic: CNs III-XII grossly intact; speech clear  Psychiatric: Appropriate affect, normal mood    LABS  Results for orders placed or performed during the hospital encounter of 10/23/21   CBC WITH DIFFERENTIAL   Result Value Ref Range    WBC 11.2 (H) 4.8 - 10.8 K/uL    RBC 3.74 (L) 4.70 - 6.10 M/uL    Hemoglobin 13.2 (L) 14.0 - 18.0 g/dL    Hematocrit 39.6 (L) 42.0 - 52.0 %    .9 (H) 81.4 - 97.8 fL    MCH 35.3 (H) 27.0 - 33.0 pg    MCHC 33.3 (L) 33.7 - 35.3 g/dL    RDW 52.4 (H) 35.9 - 50.0 fL    Platelet Count 133 (L) 164 - 446 K/uL    MPV 9.3 9.0 - 12.9 fL    Neutrophils-Polys 80.50 (H) 44.00 - 72.00 %    Lymphocytes 9.40 (L) 22.00 - 41.00 %    Monocytes 9.10 0.00 - 13.40 %    Eosinophils 0.10 0.00 - 6.90 %    Basophils 0.10 0.00 - 1.80 %    Immature Granulocytes 0.80 0.00 - 0.90 %    Nucleated RBC 0.00 /100 WBC    Neutrophils (Absolute) 9.02 (H) 1.82 - 7.42 K/uL    Lymphs (Absolute) 1.05 1.00 - 4.80 K/uL    Monos (Absolute) 1.02 (H) 0.00 - 0.85 K/uL    Eos (Absolute) 0.01 0.00 - 0.51 K/uL    Baso (Absolute) 0.01 0.00 - 0.12 K/uL    Immature Granulocytes (abs) 0.09 0.00 - 0.11 K/uL    NRBC (Absolute) 0.00 K/uL   COMP METABOLIC PANEL   Result Value Ref Range    Sodium 137 135 - 145 mmol/L    Potassium 3.8 3.6 - 5.5 mmol/L    Chloride 105 96 - 112 mmol/L    Co2 21 20 - 33 mmol/L    Anion Gap 11.0 7.0 - 16.0    Glucose 124 (H) 65 - 99 mg/dL    Bun 12 8 - 22 mg/dL    Creatinine 0.61 0.50 - 1.40 mg/dL    Calcium 8.6 8.4 - 10.2 mg/dL    AST(SGOT) 11 (L) 12 - 45 U/L    ALT(SGPT) 9 2 - 50 U/L    Alkaline Phosphatase 77 30 - 99 U/L    Total Bilirubin 0.9 0.1 - 1.5 mg/dL    Albumin 3.4 3.2 - 4.9 g/dL    Total Protein 6.3 6.0 - 8.2 g/dL    Globulin 2.9 1.9 - 3.5 g/dL    A-G Ratio 1.2 g/dL   LACTIC ACID   Result Value Ref Range    Lactic Acid 1.4 0.5 -  2.0 mmol/L   URINALYSIS    Specimen: Urine   Result Value Ref Range    Color Yellow     Character Cloudy (A)     Specific Gravity 1.020 <1.035    Ph 7.5 5.0 - 8.0    Glucose Negative Negative mg/dL    Ketones Negative Negative mg/dL    Protein 100 (A) Negative mg/dL    Bilirubin Negative Negative    Nitrite Positive (A) Negative    Leukocyte Esterase Moderate (A) Negative    Occult Blood Small (A) Negative    Micro Urine Req Microscopic    URINE MICROSCOPIC (W/UA)   Result Value Ref Range    WBC 20-50 (A) /hpf    RBC 10-20 (A) /hpf    Bacteria Many (A) None /hpf    Mucous Threads Moderate /hpf    Urine Crystals Few Amorphous /hpf   ESTIMATED GFR   Result Value Ref Range    GFR If African American >60 >60 mL/min/1.73 m 2    GFR If Non African American >60 >60 mL/min/1.73 m 2         IMAGING  DX-CHEST-PORTABLE (1 VIEW)    (Results Pending)       MEDICAL RECORD  I have reviewed patient's medical record and pertinent results are listed below.      COURSE & MEDICAL DECISION MAKING  I have reviewed any medical record information, laboratory studies and radiographic results as noted.    Osvaldo Pate is a 71 y.o. quadriplegic male who presents complaining of chills and fever.  Patient has a low-grade temperature here but does not appear septic.    Appropriate PPE was worn at all times while interacting with the patient.    Urinalysis demonstrates pyuria of 20-50, RBCs 10-20, and many bacteria.  Nitrites are also demonstrated.  White count is slightly elevated at 11.2, hemoglobin is low at 13.2, and lactic acid is within normal limits.  Blood cultures are pending.    I reviewed the patient's urine cultures from the last 6 months or so and he has grown out Pseudomonas, Enterococcus, and Proteus.  I consulted the clinical pharmacist for recommendations regarding antibiotic treatment.  She recommends Zosyn at the pseudomonal dose.  For this reason and the patient's immunocompromised state as a quadriplegic with an  elevated white blood cell count, he will be admitted to the hospitalist service.    1:50 PM  I discussed the case with the hospitalist who agrees to admit the patient.  Chest x-ray is pending.      FINAL IMPRESSION  1. Fever, unspecified fever cause     2. Acute UTI         Electronically signed by: Magdalena Boogie M.D., 10/23/2021 12:35 PM

## 2021-10-23 NOTE — PROGRESS NOTES
Report received from night shift RN. Assume care. Pt. AAOx4 pt is bed,  Assessment completed. VSS. Denies pain, Pt is quadriplegic from chest down Sacrum and endy LE  Dressing in place DCI, Per Pt HH RN changed all dressings yesterday.  Pt was update for the care for the day. White board updated, All question answered. Pt has call light within reach,  bed is in the lowest position. Pt has no other needs at this time.   Covid 19 surge in effect

## 2021-10-23 NOTE — ASSESSMENT & PLAN NOTE
Suprapubic cath in place, will exchange  Patient with home health care who exchanges catheter every 30 days

## 2021-10-23 NOTE — ASSESSMENT & PLAN NOTE
UA concerning for UTI, causing fever  No sepsis  Patient has history of antibiotic resistant pseudomonas  Continue on zosyn for now  F/u urine culture  Blood cultures negative x2  Exchange suprapubic cath

## 2021-10-23 NOTE — ASSESSMENT & PLAN NOTE
Questionable history  Recent EKG without evidence of atrial flutter  No active chest pain or palpitations at this time

## 2021-10-23 NOTE — H&P
Hospital Medicine History & Physical Note    Date of Service  10/23/2021    Primary Care Physician  WESTON Pretty.    Consultants  None    Code Status  Full    Chief Complaint  Chief Complaint   Patient presents with   • Fever     pt reports fever started this AM, T-max 100.7 at home.        History of Presenting Illness  Osvaldo Pate is a 71 y.o. male who presented 10/23/2021 with C5C7 incomplete quadriplegia, ostomy, neurogenic bladder with suprapubic cath, HTN who presented with fever. He's had abd fullness past 2 days which is usually when he feels a UTI. This morning he had temp of 100.9. He denies N/V, CP, SOB. He's had a history of recurrent UTIs, last urine culture grew pseudomonas resistant to cipro. He had suprapubic cath placed 4/2021, his home health nurse changes in every 30 days, last got changed 10/12. In the ED his urine is positive for infection    I discussed the plan of care with patient.    Review of Systems  Review of Systems   Constitutional: Positive for fever.   Respiratory: Negative for shortness of breath.    Cardiovascular: Negative for chest pain.   Gastrointestinal: Positive for abdominal pain and diarrhea (chronic, ostomy). Negative for nausea and vomiting.   Genitourinary: Negative for hematuria.   Musculoskeletal: Negative for back pain.   Neurological: Positive for tingling (chronic) and focal weakness (chronic).   All other systems reviewed and are negative.      Past Medical History   has a past medical history of A-fib ([facility]), Atrial flutter ([facility]), Blood clotting disorder ([facility]), Bowel habit changes, GERD (gastroesophageal reflux disease), Hypertension, Neurogenic bladder, and Quadriplegia, C5-C7 complete ([facility]).    Surgical History   has a past surgical history that includes percutaneouospinning lower extremity; colostomy; colostomy takedown; colostomy (N/A, 7/27/2019); ulcer debridement (N/A, 8/21/2019); flap closure (8/21/2019); hernia repair; and irrigation &  [facility] general (12/20/2020).     Family History  family history includes Heart Disease in his father.   Social History   reports that he quit smoking about 44 years ago. His smoking use included cigarettes. He started smoking about 54 years ago. He smoked 1.00 pack per day. He has never used smokeless tobacco. He reports current alcohol use of about 0.6 oz of alcohol per week. He reports that he does not use drugs.    Allergies  Allergies   Allergen Reactions   • Sulfa Drugs Rash     Rash         Medications  Prior to Admission Medications   Prescriptions Last Dose Informant Patient Reported? Taking?   Cholecalciferol (VITAMIN D) 2000 UNIT Tab 10/23/2021 at 0800 MAR from Other Facility Yes No   Sig: Take 2,000 Units by mouth every day.   Non Formulary Request 10/23/2021 at 0800 MAR from Other Facility Yes Yes   Sig: Take 2 Tablets by mouth 2 times a day. Mannose/Cranberry 900mg/500mg   Probiotic Product (PROBIOTIC PO) 10/23/2021 at 0800 MAR from Other Facility Yes No   Sig: Take 1 Tab by mouth every day.   acetaminophen (TYLENOL) 500 MG Tab 10/23/2021 at 0800 MAR from Other Facility Yes No   Sig: Take 500-1,000 mg by mouth every four hours as needed. Takes 1000 mg twice daily then 500 mg every 4 hours as needed (3grams per 24 hours)   amLODIPine (NORVASC) 10 MG Tab 10/23/2021 at 0800 MAR from Other Facility Yes No   Sig: Take 10 mg by mouth every day.   ascorbic acid (ASCORBIC ACID) 500 MG Tab 10/23/2021 at 0800 MAR from Other Facility Yes No   Sig: Take 500 mg by mouth 2 (two) times a day.   baclofen (LIORESAL) 20 MG tablet 10/23/2021 at 0800 MAR from Other Facility Yes No   Sig: Take 20 mg by mouth 4 times a day.   cephALEXin (KEFLEX) 250 MG Cap 10/22/2021 at PM MAR from Other Facility Yes Yes   Sig: Take 250 mg by mouth every day.   docusate sodium (COLACE) 100 MG Cap 10/23/2021 at 0800 MAR from Other Facility Yes No   Sig: Take 200 mg by mouth 2 times a day.   ferrous sulfate 325 (65 Fe) MG tablet  10/23/2021 at 0800 MAR from Other Facility Yes No   Sig: Take 325 mg by mouth 2 Times a Day.   losartan (COZAAR) 50 MG Tab 10/22/2021 at PM MAR from Other Facility Yes No   Sig: Take 50 mg by mouth every day.   magnesium oxide (MAG-OX) 400 MG Tab tablet 10/23/2021 at 0800 MAR from Other Facility Yes No   Sig: Take 400 mg by mouth every day.   melatonin 5 mg Tab 10/22/2021 at PM MAR from Other Facility Yes No   Sig: Take 10 mg by mouth at bedtime.   polyethylene glycol/lytes (MIRALAX) 17 g Pack 10/23/2021 at 0800 MAR from Other Facility Yes No   Sig: Take 17 g by mouth every day.   sennosides (SENOKOT) 8.6 MG Tab 10/23/2021 at 0800 MAR from Other Facility Yes No   Sig: Take 17.2 mg by mouth 2 times a day.   therapeutic multivitamin-minerals (THERAGRAN-M) Tab 10/23/2021 at 0800 MAR from Other Facility Yes No   Sig: Take 1 Tab by mouth every day.   zinc sulfate (ZINCATE) 220 (50 Zn) MG Cap 10/23/2021 at 0800 MAR from Other Facility Yes Yes   Sig: Take 220 mg by mouth every day.      Facility-Administered Medications: None       Physical Exam  Temp:  [37.3 °C (99.2 °F)-37.9 °C (100.2 °F)] 37.3 °C (99.2 °F)  Pulse:  [56-81] 67  Resp:  [17-20] 17  BP: (125-170)/(57-71) 170/68  SpO2:  [90 %-95 %] 93 %  Blood Pressure : 134/65   Temperature: 37.9 °C (100.2 °F)   Pulse: 75   Respiration: 18   Pulse Oximetry: 91 %       Physical Exam  Vitals and nursing note reviewed.   Constitutional:       Appearance: He is not toxic-appearing.   HENT:      Head: Normocephalic.      Mouth/Throat:      Mouth: Mucous membranes are moist.   Eyes:      General:         Right eye: No discharge.         Left eye: No discharge.   Cardiovascular:      Rate and Rhythm: Normal rate and regular rhythm.   Pulmonary:      Effort: Pulmonary effort is normal. No respiratory distress.      Breath sounds: No wheezing or rales.   Abdominal:      Palpations: Abdomen is soft.      Tenderness: There is no abdominal tenderness. There is no guarding or rebound.       Comments: Suprapubic cath in place, minimal surrounding erythema  Ostomy with brown stool   Musculoskeletal:         General: No swelling.      Cervical back: Neck supple.   Skin:     General: Skin is warm and dry.   Neurological:      Mental Status: He is alert and oriented to person, place, and time.      Comments: 4/5 strength UE  Unable to move both LE, decreased sensation         Laboratory:  Recent Labs     10/23/21  1147   WBC 11.2*   RBC 3.74*   HEMOGLOBIN 13.2*   HEMATOCRIT 39.6*   .9*   MCH 35.3*   MCHC 33.3*   RDW 52.4*   PLATELETCT 133*   MPV 9.3     Recent Labs     10/23/21  1147   SODIUM 137   POTASSIUM 3.8   CHLORIDE 105   CO2 21   GLUCOSE 124*   BUN 12   CREATININE 0.61   CALCIUM 8.6     Recent Labs     10/23/21  1147   ALTSGPT 9   ASTSGOT 11*   ALKPHOSPHAT 77   TBILIRUBIN 0.9   GLUCOSE 124*         No results for input(s): NTPROBNP in the last 72 hours.      No results for input(s): TROPONINT in the last 72 hours.    Imaging:  DX-CHEST-PORTABLE (1 VIEW)   Final Result         1. No acute cardiopulmonary abnormalities are identified.        DX-CHEST-PORTABLE (1 VIEW)   Final Result         1. No acute cardiopulmonary abnormalities are identified.            Assessment/Plan:  I anticipate this patient will require at least two midnights for appropriate medical management, necessitating inpatient admission.    * Acute cystitis without hematuria  Assessment & Plan  UA concerning for UTI, causing fever  No sepsis  Patient has history of antibiotic resistant pseudomonas  Continue on zosyn for now  F/u urine and blood cultures  Exchange suprapubic cath    Decubitus ulcer- (present on admission)  Assessment & Plan  History of skin flap  Wound care consult    S/P colostomy (HCC)- (present on admission)  Assessment & Plan  Ostomy in place    Anemia- (present on admission)  Assessment & Plan  Hgb near his baseline, stable    Neurogenic bladder- (present on admission)  Assessment & Plan  Suprapubic  cath in place, will exchange    Chronic incomplete quadriplegia (HCC)- (present on admission)  Assessment & Plan  Lives in group home    Essential hypertension- (present on admission)  Assessment & Plan  Continue norvasc with hold parameters    Paroxysmal atrial flutter (HCC)- (present on admission)  Assessment & Plan  Not on medications for this      VTE prophylaxis: enoxaparin ppx

## 2021-10-24 PROBLEM — A41.9 SEPSIS (HCC): Status: ACTIVE | Noted: 2021-10-24

## 2021-10-24 LAB
ANION GAP SERPL CALC-SCNC: 11 MMOL/L (ref 7–16)
BASOPHILS # BLD AUTO: 0.4 % (ref 0–1.8)
BASOPHILS # BLD: 0.03 K/UL (ref 0–0.12)
BUN SERPL-MCNC: 10 MG/DL (ref 8–22)
CALCIUM SERPL-MCNC: 8.4 MG/DL (ref 8.4–10.2)
CHLORIDE SERPL-SCNC: 107 MMOL/L (ref 96–112)
CO2 SERPL-SCNC: 20 MMOL/L (ref 20–33)
CREAT SERPL-MCNC: 0.59 MG/DL (ref 0.5–1.4)
EOSINOPHIL # BLD AUTO: 0.11 K/UL (ref 0–0.51)
EOSINOPHIL NFR BLD: 1.3 % (ref 0–6.9)
ERYTHROCYTE [DISTWIDTH] IN BLOOD BY AUTOMATED COUNT: 53.8 FL (ref 35.9–50)
GLUCOSE SERPL-MCNC: 104 MG/DL (ref 65–99)
HCT VFR BLD AUTO: 37.2 % (ref 42–52)
HGB BLD-MCNC: 12.2 G/DL (ref 14–18)
IMM GRANULOCYTES # BLD AUTO: 0.06 K/UL (ref 0–0.11)
IMM GRANULOCYTES NFR BLD AUTO: 0.7 % (ref 0–0.9)
LYMPHOCYTES # BLD AUTO: 1.29 K/UL (ref 1–4.8)
LYMPHOCYTES NFR BLD: 15.3 % (ref 22–41)
MCH RBC QN AUTO: 35.5 PG (ref 27–33)
MCHC RBC AUTO-ENTMCNC: 32.8 G/DL (ref 33.7–35.3)
MCV RBC AUTO: 108.1 FL (ref 81.4–97.8)
MONOCYTES # BLD AUTO: 0.99 K/UL (ref 0–0.85)
MONOCYTES NFR BLD AUTO: 11.7 % (ref 0–13.4)
NEUTROPHILS # BLD AUTO: 5.97 K/UL (ref 1.82–7.42)
NEUTROPHILS NFR BLD: 70.6 % (ref 44–72)
NRBC # BLD AUTO: 0 K/UL
NRBC BLD-RTO: 0 /100 WBC
PLATELET # BLD AUTO: 99 K/UL (ref 164–446)
PMV BLD AUTO: 8.6 FL (ref 9–12.9)
POTASSIUM SERPL-SCNC: 4.1 MMOL/L (ref 3.6–5.5)
RBC # BLD AUTO: 3.44 M/UL (ref 4.7–6.1)
SODIUM SERPL-SCNC: 138 MMOL/L (ref 135–145)
WBC # BLD AUTO: 8.5 K/UL (ref 4.8–10.8)

## 2021-10-24 PROCEDURE — 36415 COLL VENOUS BLD VENIPUNCTURE: CPT

## 2021-10-24 PROCEDURE — 80048 BASIC METABOLIC PNL TOTAL CA: CPT

## 2021-10-24 PROCEDURE — 770006 HCHG ROOM/CARE - MED/SURG/GYN SEMI*

## 2021-10-24 PROCEDURE — A9270 NON-COVERED ITEM OR SERVICE: HCPCS | Performed by: INTERNAL MEDICINE

## 2021-10-24 PROCEDURE — 700111 HCHG RX REV CODE 636 W/ 250 OVERRIDE (IP): Performed by: INTERNAL MEDICINE

## 2021-10-24 PROCEDURE — 99233 SBSQ HOSP IP/OBS HIGH 50: CPT | Performed by: INTERNAL MEDICINE

## 2021-10-24 PROCEDURE — 700105 HCHG RX REV CODE 258: Performed by: INTERNAL MEDICINE

## 2021-10-24 PROCEDURE — 700102 HCHG RX REV CODE 250 W/ 637 OVERRIDE(OP): Performed by: INTERNAL MEDICINE

## 2021-10-24 PROCEDURE — 85025 COMPLETE CBC W/AUTO DIFF WBC: CPT

## 2021-10-24 RX ORDER — DOCUSATE SODIUM 100 MG/1
200 CAPSULE, LIQUID FILLED ORAL 2 TIMES DAILY
Status: DISCONTINUED | OUTPATIENT
Start: 2021-10-24 | End: 2021-10-26 | Stop reason: HOSPADM

## 2021-10-24 RX ORDER — SENNOSIDES A AND B 8.6 MG/1
17.2 TABLET, FILM COATED ORAL 2 TIMES DAILY
Status: DISCONTINUED | OUTPATIENT
Start: 2021-10-24 | End: 2021-10-26 | Stop reason: HOSPADM

## 2021-10-24 RX ORDER — LOSARTAN POTASSIUM 25 MG/1
50 TABLET ORAL DAILY
Status: DISCONTINUED | OUTPATIENT
Start: 2021-10-24 | End: 2021-10-26 | Stop reason: HOSPADM

## 2021-10-24 RX ORDER — FERROUS SULFATE 325(65) MG
325 TABLET ORAL 2 TIMES DAILY
Status: DISCONTINUED | OUTPATIENT
Start: 2021-10-24 | End: 2021-10-26 | Stop reason: HOSPADM

## 2021-10-24 RX ADMIN — SENNOSIDES 17.2 MG: 8.6 TABLET, FILM COATED ORAL at 17:19

## 2021-10-24 RX ADMIN — DOCUSATE SODIUM 100 MG: 100 CAPSULE, LIQUID FILLED ORAL at 05:53

## 2021-10-24 RX ADMIN — PIPERACILLIN AND TAZOBACTAM 4.5 G: 4; .5 INJECTION, POWDER, LYOPHILIZED, FOR SOLUTION INTRAVENOUS; PARENTERAL at 05:53

## 2021-10-24 RX ADMIN — BACLOFEN 20 MG: 10 TABLET ORAL at 08:33

## 2021-10-24 RX ADMIN — SENNOSIDES 17.2 MG: 8.6 TABLET, FILM COATED ORAL at 08:33

## 2021-10-24 RX ADMIN — BACLOFEN 20 MG: 10 TABLET ORAL at 20:38

## 2021-10-24 RX ADMIN — Medication 10 MG: at 21:59

## 2021-10-24 RX ADMIN — AMLODIPINE BESYLATE 10 MG: 5 TABLET ORAL at 05:53

## 2021-10-24 RX ADMIN — DOCUSATE SODIUM 200 MG: 100 CAPSULE, LIQUID FILLED ORAL at 17:19

## 2021-10-24 RX ADMIN — BACLOFEN 20 MG: 10 TABLET ORAL at 17:19

## 2021-10-24 RX ADMIN — FERROUS SULFATE TAB 325 MG (65 MG ELEMENTAL FE) 325 MG: 325 (65 FE) TAB at 12:46

## 2021-10-24 RX ADMIN — BACLOFEN 20 MG: 10 TABLET ORAL at 12:46

## 2021-10-24 RX ADMIN — PIPERACILLIN AND TAZOBACTAM 4.5 G: 4; .5 INJECTION, POWDER, LYOPHILIZED, FOR SOLUTION INTRAVENOUS; PARENTERAL at 12:47

## 2021-10-24 RX ADMIN — PIPERACILLIN AND TAZOBACTAM 4.5 G: 4; .5 INJECTION, POWDER, LYOPHILIZED, FOR SOLUTION INTRAVENOUS; PARENTERAL at 20:38

## 2021-10-24 RX ADMIN — ENOXAPARIN SODIUM 40 MG: 40 INJECTION SUBCUTANEOUS at 05:52

## 2021-10-24 RX ADMIN — FERROUS SULFATE TAB 325 MG (65 MG ELEMENTAL FE) 325 MG: 325 (65 FE) TAB at 17:24

## 2021-10-24 ASSESSMENT — COGNITIVE AND FUNCTIONAL STATUS - GENERAL
SUGGESTED CMS G CODE MODIFIER MOBILITY: CM
SUGGESTED CMS G CODE MODIFIER DAILY ACTIVITY: CL
MOVING FROM LYING ON BACK TO SITTING ON SIDE OF FLAT BED: UNABLE
EATING MEALS: A LITTLE
HELP NEEDED FOR BATHING: TOTAL
TOILETING: A LOT
WALKING IN HOSPITAL ROOM: TOTAL
TURNING FROM BACK TO SIDE WHILE IN FLAT BAD: A LOT
MOVING TO AND FROM BED TO CHAIR: UNABLE
STANDING UP FROM CHAIR USING ARMS: TOTAL
DRESSING REGULAR UPPER BODY CLOTHING: A LOT
DRESSING REGULAR LOWER BODY CLOTHING: TOTAL
PERSONAL GROOMING: A LITTLE
CLIMB 3 TO 5 STEPS WITH RAILING: TOTAL
MOBILITY SCORE: 7
DAILY ACTIVITIY SCORE: 12

## 2021-10-24 ASSESSMENT — ENCOUNTER SYMPTOMS
BLURRED VISION: 0
PALPITATIONS: 0
BLOOD IN STOOL: 0
SHORTNESS OF BREATH: 0
DEPRESSION: 0
HEMOPTYSIS: 0
FLANK PAIN: 0
EYE DISCHARGE: 0
DIZZINESS: 0
MYALGIAS: 0
DIARRHEA: 0
SPEECH CHANGE: 0
HEARTBURN: 0
DOUBLE VISION: 0
SORE THROAT: 0
CLAUDICATION: 0
CHILLS: 0
BRUISES/BLEEDS EASILY: 0
WEAKNESS: 0
COUGH: 0
FEVER: 1
SPUTUM PRODUCTION: 0
ABDOMINAL PAIN: 1
HEADACHES: 0
WHEEZING: 0
PHOTOPHOBIA: 0
SENSORY CHANGE: 0
ORTHOPNEA: 0
VOMITING: 0
NAUSEA: 1
BACK PAIN: 0

## 2021-10-24 ASSESSMENT — PAIN DESCRIPTION - PAIN TYPE
TYPE: ACUTE PAIN
TYPE: ACUTE PAIN

## 2021-10-24 NOTE — PROGRESS NOTES
[facility] Medicine Daily Progress Note    Date of Service  10/24/2021    Chief Complaint  Osvaldo Pate is a 71 y.o. male admitted 10/23/2021 with abdominal fullness      Hospital Course    Osvaldo Pate is a 71 y.o. male who presented 10/23/2021 with C5C7 incomplete quadriplegia, ostomy, neurogenic bladder with suprapubic cath, HTN who presented with fever. He's had abd fullness past 2 days which is usually when he feels a UTI. This morning he had temp of 100.9. He denies N/V, CP, SOB. He's had a history of recurrent UTIs, last urine culture grew pseudomonas resistant to cipro. He had suprapubic cath placed 4/2021, his home health nurse changes in every 30 days, last got changed 10/12. In the ED his urine is positive for infection    Interval Problem Update    10/24/2021: The patient was admitted for further evaluation and management of complicated cystitis.  Patient does have a history of recurrent UTIs associated with chronic suprapubic catheter which was placed for neurogenic bladder given his history of paraplegia. Patient with decubitus ulcers, pending wound care. Previous cultures show Pseudomonas resistant to ceftriaxone. He is currently requiring IV zosyn while pending repeat urine cultures.  At this time patient continues to tolerate oral intake and denies any chest pains, palpitations, shortness of breath.  No other overnight events reported.    I have personally seen and examined the patient at bedside. I discussed the plan of care with patient.    Consultants/Specialty  none    Code Status  Full Code    Disposition  Patient is not medically cleared.   Anticipate discharge to group home.  I have placed the appropriate orders for post-discharge needs.    Review of Systems  Review of Systems   Constitutional: Positive for fever. Negative for chills and malaise/fatigue.   HENT: Negative for congestion, hearing loss, sore throat and tinnitus.    Eyes: Negative for blurred vision, double  vision, photophobia and discharge.   Respiratory: Negative for cough, hemoptysis, sputum production, shortness of breath and wheezing.    Cardiovascular: Negative for chest pain, palpitations, orthopnea, claudication and leg swelling.   Gastrointestinal: Positive for abdominal pain and nausea. Negative for blood in stool, diarrhea, heartburn, melena and vomiting.   Genitourinary: Negative for dysuria, flank pain, hematuria and urgency.   Musculoskeletal: Negative for back pain, joint pain and myalgias.   Skin: Negative for itching and rash.   Neurological: Negative for dizziness, sensory change, speech change, weakness and headaches.   Endo/Heme/Allergies: Does not bruise/bleed easily.   Psychiatric/Behavioral: Negative for depression and suicidal ideas.        Physical Exam  Temp:  [36.9 °C (98.5 °F)-37.9 °C (100.2 °F)] 36.9 °C (98.5 °F)  Pulse:  [53-81] 60  Resp:  [16-20] 16  BP: (106-170)/(53-71) 134/55  SpO2:  [90 %-97 %] 95 %    Physical Exam  Vitals reviewed.   Constitutional:       Appearance: Normal appearance.   HENT:      Head: Normocephalic and atraumatic.      Nose: No congestion or rhinorrhea.      Mouth/Throat:      Mouth: Mucous membranes are moist.      Pharynx: Oropharynx is clear.   Eyes:      General: No scleral icterus.     Extraocular Movements: Extraocular movements intact.      Conjunctiva/sclera: Conjunctivae normal.      Pupils: Pupils are equal, round, and reactive to light.   Cardiovascular:      Rate and Rhythm: Normal rate and regular rhythm.      Heart sounds: No murmur heard.   No friction rub. No gallop.    Pulmonary:      Effort: Pulmonary effort is normal. No respiratory distress.      Breath sounds: Normal breath sounds. No stridor. No wheezing, rhonchi or rales.   Abdominal:      General: Abdomen is flat. Bowel sounds are normal. There is no distension.      Palpations: Abdomen is soft. There is no mass.      Tenderness: There is no abdominal tenderness. There is no guarding or  rebound.      Comments: Ostomy and suprapubic catheter in place   Musculoskeletal:         General: No swelling, tenderness or deformity.      Cervical back: Neck supple. No rigidity. No muscular tenderness.   Lymphadenopathy:      Cervical: No cervical adenopathy.   Skin:     General: Skin is warm and dry.      Findings: No erythema or rash.   Neurological:      General: No focal deficit present.      Mental Status: He is alert and oriented to person, place, and time. Mental status is at baseline.      Cranial Nerves: No cranial nerve deficit.      Sensory: Sensory deficit present.      Motor: Weakness present.      Comments: History of quadriplegia with sensory level deficit from nipple line downward. Stable and chronic.    Psychiatric:         Mood and Affect: Mood normal.         Behavior: Behavior normal.         Thought Content: Thought content normal.         Fluids    Intake/Output Summary (Last 24 hours) at 10/24/2021 1112  Last data filed at 10/24/2021 0953  Gross per 24 hour   Intake 680 ml   Output 1800 ml   Net -1120 ml       Laboratory  Recent Labs     10/23/21  1147 10/24/21  0358   WBC 11.2* 8.5   RBC 3.74* 3.44*   HEMOGLOBIN 13.2* 12.2*   HEMATOCRIT 39.6* 37.2*   .9* 108.1*   MCH 35.3* 35.5*   MCHC 33.3* 32.8*   RDW 52.4* 53.8*   PLATELETCT 133* 99*   MPV 9.3 8.6*     Recent Labs     10/23/21  1147 10/24/21  0358   SODIUM 137 138   POTASSIUM 3.8 4.1   CHLORIDE 105 107   CO2 21 20   GLUCOSE 124* 104*   BUN 12 10   CREATININE 0.61 0.59   CALCIUM 8.6 8.4                   Imaging  DX-CHEST-PORTABLE (1 VIEW)   Final Result         1. No acute cardiopulmonary abnormalities are identified.           Assessment/Plan  * Acute cystitis without hematuria  Assessment & Plan  UA concerning for UTI, causing fever  No sepsis  Patient has history of antibiotic resistant pseudomonas  Continue on zosyn for now  F/u urine culture  Blood cultures negative x2  Exchange suprapubic cath    Quadriplegia, C5-C7  complete (HCC)- (present on admission)  Assessment & Plan  No new motor/sensory deficits   Continue DVT prophylaxis   Wound care for decubitus ulcers     Decubitus ulcer- (present on admission)  Assessment & Plan  History of skin flap  Wound care consult    S/P colostomy (HCC)- (present on admission)  Assessment & Plan  Ostomy in place    Anemia- (present on admission)  Assessment & Plan  Hgb near his baseline, stable  Macrocytic     Neurogenic bladder- (present on admission)  Assessment & Plan  Suprapubic cath in place, will exchange  Patient with home health care who exchanges catheter every 30 days    Chronic incomplete quadriplegia (HCC)- (present on admission)  Assessment & Plan  Lives in group home    Essential hypertension- (present on admission)  Assessment & Plan  Continue norvasc with hold parameters    Paroxysmal atrial flutter (HCC)- (present on admission)  Assessment & Plan  Questionable history  Recent EKG without evidence of atrial flutter  No active chest pain or palpitations at this time       VTE prophylaxis: enoxaparin ppx    I have performed a physical exam and reviewed and updated ROS and Plan today (10/24/2021). In review of yesterday's note (10/23/2021), there are no changes except as documented above.

## 2021-10-24 NOTE — PROGRESS NOTES
"\"Covid 19 surge in effect\"    Report received from day RN Pt is AAO x 4.  Pt reports no pain.POC discussed to change suprapubic cath, empty colostomy bag. Oral care and reposition every 2 hours.Evening assessment completed.  All needs met at this time.Bed in low position.Call light within reach.Rounding in place.   "

## 2021-10-24 NOTE — CARE PLAN
The patient is Stable - Low risk of patient condition declining or worsening    Shift Goals  Clinical Goals: hemodynamically stable  Patient Goals: feel comfortably    Progress made toward(s) clinical / shift goals:  Abx for UTI infusing. VSS during rest of shift    Patient is not progressing towards the following goals:

## 2021-10-24 NOTE — CARE PLAN
The patient is Stable - Low risk of patient condition declining or worsening    Shift Goals  Clinical Goals: Change cath and q2 turns  Patient Goals: Pt will rest    Progress made toward(s) clinical / shift goals:  Pt requested melatonin,Page made and it was ordered.    Patient is not progressing towards the following goals:      Problem: Knowledge Deficit - Standard  Goal: Patient and family/care givers will demonstrate understanding of plan of care, disease process/condition, diagnostic tests and medications  Outcome: Progressing     Problem: Skin Integrity  Goal: Skin integrity is maintained or improved  Outcome: Progressing

## 2021-10-24 NOTE — PROGRESS NOTES
Report received from night shift RN. Assume care. Pt. AAOx4 pt is bed,  Assessment completed. VSS. Denies pain, Pt with paraplegia with pulses to endy LE. Dressing to sacrum, shin and L heel, in place DCI, Q2 turns in place.Pt was update for the care for the day. White board updated, All question answered. Pt has call light within reach,  bed is in the lowest position. Pt has no other needs at this time.   Covid 19 surge in effect

## 2021-10-25 LAB
ANION GAP SERPL CALC-SCNC: 12 MMOL/L (ref 7–16)
BUN SERPL-MCNC: 11 MG/DL (ref 8–22)
CALCIUM SERPL-MCNC: 8.6 MG/DL (ref 8.4–10.2)
CHLORIDE SERPL-SCNC: 109 MMOL/L (ref 96–112)
CO2 SERPL-SCNC: 20 MMOL/L (ref 20–33)
CREAT SERPL-MCNC: 0.63 MG/DL (ref 0.5–1.4)
ERYTHROCYTE [DISTWIDTH] IN BLOOD BY AUTOMATED COUNT: 53.6 FL (ref 35.9–50)
GLUCOSE SERPL-MCNC: 104 MG/DL (ref 65–99)
HCT VFR BLD AUTO: 38.5 % (ref 42–52)
HGB BLD-MCNC: 12.6 G/DL (ref 14–18)
MAGNESIUM SERPL-MCNC: 2.3 MG/DL (ref 1.5–2.5)
MCH RBC QN AUTO: 35.4 PG (ref 27–33)
MCHC RBC AUTO-ENTMCNC: 32.7 G/DL (ref 33.7–35.3)
MCV RBC AUTO: 108.1 FL (ref 81.4–97.8)
PHOSPHATE SERPL-MCNC: 3.4 MG/DL (ref 2.5–4.5)
PLATELET # BLD AUTO: 116 K/UL (ref 164–446)
PMV BLD AUTO: 8.9 FL (ref 9–12.9)
POTASSIUM SERPL-SCNC: 4.2 MMOL/L (ref 3.6–5.5)
RBC # BLD AUTO: 3.56 M/UL (ref 4.7–6.1)
SODIUM SERPL-SCNC: 141 MMOL/L (ref 135–145)
WBC # BLD AUTO: 7.1 K/UL (ref 4.8–10.8)

## 2021-10-25 PROCEDURE — 700102 HCHG RX REV CODE 250 W/ 637 OVERRIDE(OP): Performed by: INTERNAL MEDICINE

## 2021-10-25 PROCEDURE — 97165 OT EVAL LOW COMPLEX 30 MIN: CPT

## 2021-10-25 PROCEDURE — 97602 WOUND(S) CARE NON-SELECTIVE: CPT

## 2021-10-25 PROCEDURE — 770006 HCHG ROOM/CARE - MED/SURG/GYN SEMI*

## 2021-10-25 PROCEDURE — A9270 NON-COVERED ITEM OR SERVICE: HCPCS | Performed by: INTERNAL MEDICINE

## 2021-10-25 PROCEDURE — 94760 N-INVAS EAR/PLS OXIMETRY 1: CPT

## 2021-10-25 PROCEDURE — 700105 HCHG RX REV CODE 258: Performed by: INTERNAL MEDICINE

## 2021-10-25 PROCEDURE — 80048 BASIC METABOLIC PNL TOTAL CA: CPT

## 2021-10-25 PROCEDURE — 302098 PASTE RING (FLAT): Performed by: INTERNAL MEDICINE

## 2021-10-25 PROCEDURE — 97535 SELF CARE MNGMENT TRAINING: CPT

## 2021-10-25 PROCEDURE — 700111 HCHG RX REV CODE 636 W/ 250 OVERRIDE (IP): Performed by: INTERNAL MEDICINE

## 2021-10-25 PROCEDURE — 36415 COLL VENOUS BLD VENIPUNCTURE: CPT

## 2021-10-25 PROCEDURE — 84100 ASSAY OF PHOSPHORUS: CPT

## 2021-10-25 PROCEDURE — 99232 SBSQ HOSP IP/OBS MODERATE 35: CPT | Performed by: INTERNAL MEDICINE

## 2021-10-25 PROCEDURE — 83735 ASSAY OF MAGNESIUM: CPT

## 2021-10-25 PROCEDURE — 85027 COMPLETE CBC AUTOMATED: CPT

## 2021-10-25 RX ADMIN — BACLOFEN 20 MG: 10 TABLET ORAL at 17:00

## 2021-10-25 RX ADMIN — BACLOFEN 20 MG: 10 TABLET ORAL at 13:27

## 2021-10-25 RX ADMIN — PIPERACILLIN AND TAZOBACTAM 4.5 G: 4; .5 INJECTION, POWDER, LYOPHILIZED, FOR SOLUTION INTRAVENOUS; PARENTERAL at 05:28

## 2021-10-25 RX ADMIN — PIPERACILLIN AND TAZOBACTAM 4.5 G: 4; .5 INJECTION, POWDER, LYOPHILIZED, FOR SOLUTION INTRAVENOUS; PARENTERAL at 13:27

## 2021-10-25 RX ADMIN — ENOXAPARIN SODIUM 40 MG: 40 INJECTION SUBCUTANEOUS at 05:33

## 2021-10-25 RX ADMIN — Medication 10 MG: at 20:56

## 2021-10-25 RX ADMIN — FERROUS SULFATE TAB 325 MG (65 MG ELEMENTAL FE) 325 MG: 325 (65 FE) TAB at 18:00

## 2021-10-25 RX ADMIN — BACLOFEN 20 MG: 10 TABLET ORAL at 20:56

## 2021-10-25 RX ADMIN — SENNOSIDES 17.2 MG: 8.6 TABLET, FILM COATED ORAL at 20:56

## 2021-10-25 RX ADMIN — FERROUS SULFATE TAB 325 MG (65 MG ELEMENTAL FE) 325 MG: 325 (65 FE) TAB at 05:33

## 2021-10-25 RX ADMIN — SENNOSIDES 17.2 MG: 8.6 TABLET, FILM COATED ORAL at 09:16

## 2021-10-25 RX ADMIN — DOCUSATE SODIUM 200 MG: 100 CAPSULE, LIQUID FILLED ORAL at 05:32

## 2021-10-25 RX ADMIN — DOCUSATE SODIUM 200 MG: 100 CAPSULE, LIQUID FILLED ORAL at 18:00

## 2021-10-25 RX ADMIN — PIPERACILLIN AND TAZOBACTAM 4.5 G: 4; .5 INJECTION, POWDER, LYOPHILIZED, FOR SOLUTION INTRAVENOUS; PARENTERAL at 20:56

## 2021-10-25 RX ADMIN — BACLOFEN 20 MG: 10 TABLET ORAL at 09:17

## 2021-10-25 ASSESSMENT — COGNITIVE AND FUNCTIONAL STATUS - GENERAL
MOVING FROM LYING ON BACK TO SITTING ON SIDE OF FLAT BED: UNABLE
DAILY ACTIVITIY SCORE: 11
CLIMB 3 TO 5 STEPS WITH RAILING: TOTAL
STANDING UP FROM CHAIR USING ARMS: TOTAL
TOILETING: TOTAL
SUGGESTED CMS G CODE MODIFIER MOBILITY: CN
DRESSING REGULAR LOWER BODY CLOTHING: TOTAL
HELP NEEDED FOR BATHING: TOTAL
MOBILITY SCORE: 6
PERSONAL GROOMING: A LITTLE
MOVING TO AND FROM BED TO CHAIR: UNABLE
DRESSING REGULAR UPPER BODY CLOTHING: A LOT
WALKING IN HOSPITAL ROOM: TOTAL
SUGGESTED CMS G CODE MODIFIER DAILY ACTIVITY: CL
TURNING FROM BACK TO SIDE WHILE IN FLAT BAD: UNABLE
EATING MEALS: A LITTLE

## 2021-10-25 ASSESSMENT — ENCOUNTER SYMPTOMS
DEPRESSION: 0
HEARTBURN: 0
DIZZINESS: 0
BRUISES/BLEEDS EASILY: 0
DOUBLE VISION: 0
SENSORY CHANGE: 0
SPUTUM PRODUCTION: 0
NAUSEA: 0
CHILLS: 0
SORE THROAT: 0
COUGH: 0
PHOTOPHOBIA: 0
VOMITING: 0
BACK PAIN: 0
FEVER: 0
ABDOMINAL PAIN: 0
MYALGIAS: 0
SPEECH CHANGE: 0
BLURRED VISION: 0
ORTHOPNEA: 0
EYE DISCHARGE: 0
HEMOPTYSIS: 0
HEADACHES: 0
PALPITATIONS: 0

## 2021-10-25 ASSESSMENT — GAIT ASSESSMENTS: GAIT LEVEL OF ASSIST: UNABLE TO PARTICIPATE

## 2021-10-25 ASSESSMENT — ACTIVITIES OF DAILY LIVING (ADL): TOILETING: DEPENDENT

## 2021-10-25 ASSESSMENT — PAIN DESCRIPTION - PAIN TYPE: TYPE: ACUTE PAIN

## 2021-10-25 NOTE — CARE PLAN
The patient is Stable - Low risk of patient condition declining or worsening    Shift Goals  Clinical Goals: Turn pt every 2 hours  Patient Goals: Pt will rest    Progress made toward(s) clinical / shift goals:  Pt rested well q2 turns. And continuation of IV antibiotics    Patient is not progressing towards the following goals:      Problem: Knowledge Deficit - Standard  Goal: Patient and family/care givers will demonstrate understanding of plan of care, disease process/condition, diagnostic tests and medications  Outcome: Progressing     Problem: Skin Integrity  Goal: Skin integrity is maintained or improved  Outcome: Progressing     Problem: Infection - Standard  Goal: Patient will remain free from infection  Outcome: Progressing     Problem: Wound/ / Incision Healing  Goal: Patient's wound/surgical incision will decrease in size and heals properly  Outcome: Progressing

## 2021-10-25 NOTE — PROGRESS NOTES
Hospital Medicine Daily Progress Note    Date of Service  10/25/2021    Chief Complaint  Osvaldo Pate is a 71 y.o. male admitted 10/23/2021 with abdominal fullness      Hospital Course    Osvaldo Pate is a 71 y.o. male who presented 10/23/2021 with C5C7 incomplete quadriplegia, ostomy, neurogenic bladder with suprapubic cath, HTN who presented with fever. He's had abd fullness past 2 days which is usually when he feels a UTI. This morning he had temp of 100.9. He denies N/V, CP, SOB. He's had a history of recurrent UTIs, last urine culture grew pseudomonas resistant to cipro. He had suprapubic cath placed 4/2021, his home health nurse changes in every 30 days, last got changed 10/12. In the ED his urine is positive for infection    Interval Problem Update    10/24/2021: The patient was admitted for further evaluation and management of complicated cystitis.  Patient does have a history of recurrent UTIs associated with chronic suprapubic catheter which was placed for neurogenic bladder given his history of paraplegia. Patient with decubitus ulcers, pending wound care. Previous cultures show Pseudomonas resistant to ceftriaxone. He is currently requiring IV zosyn while pending repeat urine cultures.  At this time patient continues to tolerate oral intake and denies any chest pains, palpitations, shortness of breath.  No other overnight events reported.    10/25/2021: The patient was seen and evaluated at bedside and appears comfortable.  He states that his abdominal bloating has significantly improved.  He is tolerating oral intake and denies any new motor/sensory deficits.  We are still pending urine cultures and transition to oral antibiotics.  He was evaluated by PT/OT with current recommendations for home health which is pending.  No other overnight events reported.    I have personally seen and examined the patient at bedside. I discussed the plan of care with  patient.    Consultants/Specialty  none    Code Status  Full Code    Disposition  Patient is not medically cleared.   Anticipate discharge to group home.  I have placed the appropriate orders for post-discharge needs.    Review of Systems  Review of Systems   Constitutional: Negative for chills, fever and malaise/fatigue.   HENT: Negative for congestion, hearing loss, sore throat and tinnitus.    Eyes: Negative for blurred vision, double vision, photophobia and discharge.   Respiratory: Negative for cough, hemoptysis and sputum production.    Cardiovascular: Negative for chest pain, palpitations and orthopnea.   Gastrointestinal: Negative for abdominal pain, heartburn, nausea and vomiting.   Genitourinary: Negative for dysuria and urgency.   Musculoskeletal: Negative for back pain and myalgias.   Skin: Negative for itching and rash.   Neurological: Negative for dizziness, sensory change, speech change and headaches.   Endo/Heme/Allergies: Does not bruise/bleed easily.   Psychiatric/Behavioral: Negative for depression and suicidal ideas.        Physical Exam  Temp:  [36.7 °C (98 °F)-37.2 °C (98.9 °F)] 36.9 °C (98.4 °F)  Pulse:  [50-64] 64  Resp:  [16-18] 18  BP: (105-153)/(47-65) 119/47  SpO2:  [94 %-98 %] 94 %    Physical Exam  Vitals reviewed.   Constitutional:       Appearance: Normal appearance.   HENT:      Head: Normocephalic and atraumatic.      Nose: No congestion or rhinorrhea.      Mouth/Throat:      Mouth: Mucous membranes are moist.      Pharynx: Oropharynx is clear.   Eyes:      General: No scleral icterus.     Extraocular Movements: Extraocular movements intact.      Conjunctiva/sclera: Conjunctivae normal.      Pupils: Pupils are equal, round, and reactive to light.   Cardiovascular:      Rate and Rhythm: Normal rate and regular rhythm.      Pulses: Normal pulses.      Heart sounds: Normal heart sounds. No murmur heard.   No friction rub.   Pulmonary:      Effort: Pulmonary effort is normal. No  respiratory distress.      Breath sounds: Normal breath sounds. No stridor. No rhonchi.   Abdominal:      General: Abdomen is flat. Bowel sounds are normal.      Palpations: Abdomen is soft.      Comments: Ostomy and suprapubic catheter in place   Musculoskeletal:         General: No swelling, tenderness or deformity.      Cervical back: Neck supple. No rigidity. No muscular tenderness.   Lymphadenopathy:      Cervical: No cervical adenopathy.   Skin:     General: Skin is warm and dry.   Neurological:      General: No focal deficit present.      Mental Status: He is alert and oriented to person, place, and time. Mental status is at baseline.      Cranial Nerves: No cranial nerve deficit.      Sensory: Sensory deficit present.      Motor: Weakness present.      Comments: History of quadriplegia with sensory level deficit from nipple line downward. Stable and chronic.    Psychiatric:         Mood and Affect: Mood normal.         Behavior: Behavior normal.         Thought Content: Thought content normal.         Fluids    Intake/Output Summary (Last 24 hours) at 10/25/2021 1315  Last data filed at 10/25/2021 0857  Gross per 24 hour   Intake 720 ml   Output 2650 ml   Net -1930 ml       Laboratory  Recent Labs     10/23/21  1147 10/24/21  0358 10/25/21  0437   WBC 11.2* 8.5 7.1   RBC 3.74* 3.44* 3.56*   HEMOGLOBIN 13.2* 12.2* 12.6*   HEMATOCRIT 39.6* 37.2* 38.5*   .9* 108.1* 108.1*   MCH 35.3* 35.5* 35.4*   MCHC 33.3* 32.8* 32.7*   RDW 52.4* 53.8* 53.6*   PLATELETCT 133* 99* 116*   MPV 9.3 8.6* 8.9*     Recent Labs     10/23/21  1147 10/24/21  0358 10/25/21  0437   SODIUM 137 138 141   POTASSIUM 3.8 4.1 4.2   CHLORIDE 105 107 109   CO2 21 20 20   GLUCOSE 124* 104* 104*   BUN 12 10 11   CREATININE 0.61 0.59 0.63   CALCIUM 8.6 8.4 8.6                   Imaging  DX-CHEST-PORTABLE (1 VIEW)   Final Result         1. No acute cardiopulmonary abnormalities are identified.           Assessment/Plan  * Acute cystitis  without hematuria  Assessment & Plan  UA concerning for UTI, causing fever  No sepsis  Patient has history of antibiotic resistant pseudomonas  Continue on zosyn for now  F/u urine culture  Blood cultures negative x2  Exchange suprapubic cath    Quadriplegia, C5-C7 complete (HCC)- (present on admission)  Assessment & Plan  No new motor/sensory deficits   Continue DVT prophylaxis   Wound care for decubitus ulcers     Decubitus ulcer- (present on admission)  Assessment & Plan  History of skin flap  Wound care consult    S/P colostomy (HCC)- (present on admission)  Assessment & Plan  Ostomy in place    Anemia- (present on admission)  Assessment & Plan  Hgb near his baseline, stable  Macrocytic     Neurogenic bladder- (present on admission)  Assessment & Plan  Suprapubic cath in place, will exchange  Patient with home health care who exchanges catheter every 30 days    Chronic incomplete quadriplegia (HCC)- (present on admission)  Assessment & Plan  Lives in group home    Essential hypertension- (present on admission)  Assessment & Plan  Continue norvasc with hold parameters    Paroxysmal atrial flutter (HCC)- (present on admission)  Assessment & Plan  Questionable history  Recent EKG without evidence of atrial flutter  No active chest pain or palpitations at this time       VTE prophylaxis: enoxaparin ppx    I have performed a physical exam and reviewed and updated ROS and Plan today (10/25/2021). In review of yesterday's note (10/24/2021), there are no changes except as documented above.

## 2021-10-25 NOTE — THERAPY
Physical Therapy Screen     Patient Name: Osvaldo Pate  Age:  71 y.o., Sex:  male  Medical Record #: [id number]  Today's Date: 10/25/2021     Precautions  Comments: C5 incomplete Quad    Assessment  Patient is 71 y.o. male with a diagnosis of UTI/ cystitis with a history of SCI 2 yrs ago and is now a C5 incomplete quadriplegic who lives at a  and caregivers use a brigid lift for transfers.  Pt states he does not sit at the side of the bed ever at home and uses brigid only for transfer to his power w/c.  Pt is reporting that the caregivers are having difficulty with maneuvering and using the brigid at times. Given this, recommend HHPT for caregiver training and check the functionality of his current equipment.  There are no skilled PT needs in the hospital, recommend brigid transfers per RN staff if pt desires OOB.     Plan    No skilled PT needs at this time    Discharge Recommendations: Recommend home health for continued physical therapy services (for caregiver training with brigid lift)        10/25/21 8739   Prior Living Situation   Housing / Facility Group Home   Equipment Owned Power Wheel Chair  (brigid lift)   Lives with - Patient's Self Care Capacity Attendant / Paid Care Giver   Prior Level of Functional Mobility   Bed Mobility Dependent   Transfer Status Dependent   Ambulation Dependent   Assistive Devices Used Power Wheel Chair   Wheelchair Independent   Comments Pt reports uses a brigid lift for transfers   Cognition    Level of Consciousness Alert   Comments Oriented x4. Pt is reporting that the caregivers at the  use brigid lift for all transfers and then he is Indep with w/c mobility via power chair.  Pt states the caregivers are having difficulty maneuvering brigid and it is cumbersome for them to use.     Gait Analysis   Gait Level Of Assist Unable to Participate   Bed Mobility    Supine to Sit Unable to Participate   Sit to Supine Unable to Participate   Functional Mobility   Sit to Stand Unable  to Participate

## 2021-10-25 NOTE — DISCHARGE PLANNING
Agency/Facility Name: [facility]  Spoke To: Doc  Outcome: Per Doc Pt is on service with [facility]. Per Doc to send referral once pt is ready to discharge.

## 2021-10-25 NOTE — DIETARY
Nutrition Services:    Presence of wound noted on Nutrition Admit Screen. Per MD note and wound flow sheets, pt has pressure injury on his coccyx/sacrum and left pretibial. Wound consult pending.     Pt is currently on a regular diet and per chart, pt's PO has been % of all meals. PO intake has been adequate.     Ht: 177.8 cm, Wt: 95.3 kg, BMI 30.13 (class 1 obesity).    RD will follow for completed wound team assessment.

## 2021-10-25 NOTE — FACE TO FACE
Face to Face Supporting Documentation - Home Health    The encounter with this patient was in whole or in part the primary reason for home health admission.    Date of encounter:   Patient:                    MRN:                       YOB: 2021  Osvaldo Pate  [id number]  1950     Home health to see patient for: Skilled nursing care  Physical Therapy evaluation and treatment   Occupational Therapy        Homebound status evidenced by:  Needs the assistance of another person in order to leave the home. Leaving home requires a considerable and taxing effort. There is a normal inability to leave the home.    Community Physician to provide follow up care: Pcp Not In Computer     Optional Interventions? No      I certify the face to face encounter for this home health care referral meets the CMS requirements and the encounter/clinical assessment with the patient was, in whole, or in part, for the medical condition(s) listed above, which is the primary reason for home health care. Based on my clinical findings: the service(s) are medically necessary, support the need for home health care, and the homebound criteria are met.  I certify that this patient has had a face to face encounter by myself.  Markel Arceo M.D. - NPI: [id number]

## 2021-10-25 NOTE — DISCHARGE PLANNING
Anticipated Discharge Disposition: Home with [facility]    Action: Discussed pt in IDT rounds. Per MD, pt pending PT/OT. Pt came from .    Per chart review, [facility]  and was previously on service with [facility].    1320: LSW met with pt at bedside to collect HH choice. Pt states his primary care is with Geriatric specialty either Dr. Santana or Dr. Harris. Pt states he will need [facility] transport home.     Barriers to Discharge: None    Plan: LSW to assist with transport when appropriate.     Care Transition Team Assessment    Information Source  Orientation Level: Oriented X4  Information Given By: Patient  Who is responsible for making decisions for patient? : Patient    Readmission Evaluation  Is this a readmission?: No    Elopement Risk  Legal Hold: No  Ambulatory or Self Mobile in Wheelchair: No-Not an Elopement Risk  Elopement Risk: Not at Risk for Elopement    Interdisciplinary Discharge Planning  Primary Care Physician: Dr. Santana and Dr. Harris  Lives with - Patient's Self Care Capacity: Attendant / Paid Care Giver  Patient or legal guardian wants to designate a caregiver: No  Housing / Facility: long-term  Name of Care Facility: [facility]  Prior Services: Skilled Home Health Services (primarily skilled RN for wound care, ostomy, and cath care)    Discharge Preparedness  What is your plan after discharge?: Home health care  What are your discharge supports?: Child         Finances  Financial Barriers to Discharge: No    Vision / Hearing Impairment  Right Eye Vision: Impaired, Wears Glasses  Left Eye Vision: Impaired, Wears Glasses         Advance Directive  Advance Directive?: DPOA for Health Care    Domestic Abuse  Have you ever been the victim of abuse or violence?: No  Physical Abuse or Sexual Abuse: No  Verbal Abuse or Emotional Abuse: No  Possible Abuse/Neglect Reported to:: Not Applicable    Psychological Assessment  History of Substance Abuse: None    Discharge Risks or  Barriers  Discharge risks or barriers?: No    Anticipated Discharge Information  Discharge Disposition: Discharged to home/self care (01)

## 2021-10-25 NOTE — CARE PLAN
Problem: Hemodynamics  Goal: Patient's hemodynamics, fluid balance and neurologic status will be stable or improve  Outcome: Progressing     Problem: Nutrition  Goal: Patient's nutritional and fluid intake will be adequate or improve  Outcome: Progressing     Problem: Urinary Elimination  Goal: Establish and maintain regular urinary output  Outcome: Progressing     Problem: Infection - Standard  Goal: Patient will remain free from infection  Outcome: Progressing     Problem: Wound/ / Incision Healing  Goal: Patient's wound/surgical incision will decrease in size and heals properly  Outcome: Progressing   The patient is Stable - Low risk of patient condition declining or worsening    Shift Goals  Clinical Goals: Q2 turns, mantain skin free of pressure injuries  Patient Goals: rest     Progress made toward(s) clinical / shift goals:  No new skin injuries during at end of shift, Q 2 turns done when Pt allowed us.     Patient is not progressing towards the following goals:

## 2021-10-25 NOTE — PROGRESS NOTES
"\"Covid 19 surge in effect\"    Report received from day RN.Pt is AAO x 4.  Pt reports no pain .POC discussed to continue with Q2 turns and IV antibiotics.All needs met at this time.Bed in low position.Call light within reach.Rounding in place.   "

## 2021-10-25 NOTE — THERAPY
"Occupational Therapy   Initial Evaluation     Patient Name: Osvaldo Pate  Age:  71 y.o., Sex:  male  Medical Record #: [id number]  Today's Date: 10/25/2021     Precautions  Comments: C5 incomplete Quad    Assessment    Patient is 71 y.o. male with a diagnosis of UTI and acute cystitis. Additional factors influencing patient status / progress: C5 incomplete quadriplegia, A-fib, GERD, HTN, recurrent UTIs. Pt lives in a group home where he receives 24/7 care and assistance, able to self feed and perform grooming/hygiene tasks at setup level at baseline, but otherwise dependent with all other ADLs/IADLs. He utilizes a motorized wheelchair at home, and is a dependent deanna lift transfer. Today at [facility], pt presents at baseline level with ADLs/IADLs, though did express concerns re: deanna lift transfers being somewhat difficult for caregivers and concerns about burden of care/caregiver burnout. He states he is open to ideas re: home/environmental modification or caregiver training re: deanna lift transfers and accessibility assessment. Pt education/recommendation re: possible use of chair mat/floor mats for easier maneuverability of deanna lift, as pt describes deanna lift as \"getting stuck on carpet anya for some reason\". He will greatly benefit from a HH OT assessment and possible caregiver training to reduce burden of care on caregivers and for environmental modification/equipment recommendations. Patient will not be actively followed for occupational therapy services at this time, however may be seen if requested by physician for 1 more visit within 30 days to address any discharge or equipment needs.     Plan    Recommend Occupational Therapy for DC needs only.    DC Equipment Recommendations: None  Discharge Recommendations: Recommend home health for continued occupational therapy services (FOR HOME MODIFICATION &DEANNA LIFT ACCESSIBILITY ASSESSMENT)     Subjective    \"I just feel bad for my caregivers. They're " "these tiny Filipinos and I don't want their backs hurting.\"     Objective       10/25/21 0921   Prior Living Situation   Prior Services Skilled Home Health Services  (primarily skilled RN for wound care, ostomy, and cath care)   Housing / Facility Group Home   Steps Into Home 0   Steps In Home 0   Bathroom Set up Walk In Shower;Grab Bars;Shower Chair   Equipment Owned Power Wheel Chair;Tub / Shower Seat;Grab Bar(s) In Tub / Shower;Hospital Bed;Portable Patient Lift   Lives with - Patient's Self Care Capacity Attendant / Paid Care Giver   Comments Pt lives in a group home, and receives 24/7 caregiver assistance   Prior Level of ADL Function   Self Feeding Independent   Grooming / Hygiene Independent   Bathing Requires Assist   Dressing Requires Assist   Toileting Dependent   Prior Level of IADL Function   Medication Management Requires Assist   Laundry Dependent   Kitchen Mobility Dependent   Finances Dependent   Home Management Dependent   Shopping Dependent   Prior Level Of Mobility Uses Wheel Chair for in Home Mobility;Uses Wheel Chair for Community Mobility   Driving / Transportation Relatives / Others Provide Transportation   Occupation (Pre-Hospital Vocational) Retired Due To Age   History of Falls   History of Falls No   Precautions   Comments Quadriplegic male, but functional UE for G/H and self feed   Pain   Pain Scales 0 to 10 Scale    Intervention Declines   Pain 0 - 10 Group   Pain Rating Scale (NPRS) 0   Therapist Pain Assessment Post Activity Pain Same as Prior to Activity   Cognition    Cognition / Consciousness WDL   Level of Consciousness Alert   Comments pleasant and cooperative   Active ROM Upper Body   Active ROM Upper Body  X   Gross Active ROM Gross Active Range of Motion Impaired, but Appears Adequate for Functional Activities.  (G/H and self feeding)   Strength Upper Body   Upper Body Strength  X   Comments functional for G/H and self feeding   Balance Assessment   Comments pt is dependent " transfer at baseline   Bed Mobility    Supine to Sit Unable to Participate   Sit to Supine Unable to Participate   Comments dependent transfer at baseline   ADL Assessment   Eating Modified Independent   Grooming Supervision  (setup, seated upright in bed)   Bathing Total Assist   Upper Body Dressing Moderate Assist   Lower Body Dressing Total Assist   Toileting Total Assist   How much help from another person does the patient currently need...   Putting on and taking off regular lower body clothing? 1   Bathing (including washing, rinsing, and drying)? 1   Toileting, which includes using a toilet, bedpan, or urinal? 1  (dependent, suprapubic catheter and ostomy)   Putting on and taking off regular upper body clothing? 2   Taking care of personal grooming such as brushing teeth? 3   Eating meals? 3   6 Clicks Daily Activity Score 11   Functional Mobility   Sit to Stand Unable to Participate   Bed, Chair, Wheelchair Transfer Unable to Participate   Toilet Transfers Unable to Participate   Comments pt is a dependent lift/txf via use of deanna lift in group home at baseline   Visual Perception   Comments glasses   Activity Tolerance   Sitting in Chair unable   Sitting Edge of Bed unable   Standing unable   Comments dependent lift/txf at baseline   Education Group   Education Provided Role of Occupational Therapist   Role of Occupational Therapist Patient Response Patient;Acceptance;Explanation   Anticipated Discharge Equipment and Recommendations   DC Equipment Recommendations None   Discharge Recommendations Recommend home health for continued occupational therapy services  (FOR HOME MODIFICATION &DEANNA LIFT ACCESSIBILITY ASSESSMENT)   Interdisciplinary Plan of Care Collaboration   IDT Collaboration with  Nursing;Physical Therapist   Patient Position at End of Therapy In Bed;Call Light within Reach;Tray Table within Reach;Phone within Reach   Collaboration Comments RN aware of OT session   Session Information   Date /  Session Number  10/25 #1 (DC needs only)   Priority 0

## 2021-10-25 NOTE — DOCUMENTATION QUERY
[facility]                                                                       Query Response Note      PATIENT:               NICHOLAS CASEY  ACCT #:                  [id number]  MRN:                     [id number]  :                      1950  ADMIT DATE:       10/23/2021 11:37 AM  DISCH DATE:          RESPONDING  PROVIDER #:        [id number]           QUERY TEXT:    Please clarify in documentation the relationship, if any, between UTI / cystitis and urinary catheter    The patient's Clinical Indicators include:  - Findings:  H&P:  C5C7 incomplete quadriplegia, ostomy, neurogenic bladder with suprapubic catheter.  History of recurrent UTI's  - progress note 10/24/21:   Patient does have a history of recurrent UTIs associated with chronic suprapubic catheter which was placed for neurogenic bladder given his history of  paraplegia    - Treatments:  exchange urinary catheter, antibiotics, cultures, UA     - Risk factors:  recurrent UTI's, neurogenic bladder, chronic urinary catheter, acute cystitis, quadriplegia    Thank You,  Saira Pringle RN  Clinical Documentation   [email or web address]  Connect via [email or web address] Messenger  Options provided:   -- UTI / cystitis is due to or associated with urinary catheter   -- UTI/ cystitis is not due to or associated with urinary catheter   -- Unable to determine      Query created by: Saira Pringle on 10/25/2021 7:44 AM    RESPONSE TEXT:    UTI / cystitis is due to or associated with urinary catheter          Electronically signed by:  ALDO ANDRES MD 10/25/2021 9:38 AM

## 2021-10-26 VITALS
BODY MASS INDEX: 30.06 KG/M2 | TEMPERATURE: 97.3 F | DIASTOLIC BLOOD PRESSURE: 77 MMHG | OXYGEN SATURATION: 96 % | HEIGHT: 70 IN | RESPIRATION RATE: 17 BRPM | WEIGHT: 210 LBS | SYSTOLIC BLOOD PRESSURE: 125 MMHG | HEART RATE: 44 BPM

## 2021-10-26 LAB
ANION GAP SERPL CALC-SCNC: 13 MMOL/L (ref 7–16)
BACTERIA UR CULT: ABNORMAL
BACTERIA UR CULT: ABNORMAL
BUN SERPL-MCNC: 15 MG/DL (ref 8–22)
CALCIUM SERPL-MCNC: 9 MG/DL (ref 8.4–10.2)
CHLORIDE SERPL-SCNC: 107 MMOL/L (ref 96–112)
CO2 SERPL-SCNC: 22 MMOL/L (ref 20–33)
CREAT SERPL-MCNC: 0.64 MG/DL (ref 0.5–1.4)
ERYTHROCYTE [DISTWIDTH] IN BLOOD BY AUTOMATED COUNT: 51.7 FL (ref 35.9–50)
GLUCOSE SERPL-MCNC: 95 MG/DL (ref 65–99)
HCT VFR BLD AUTO: 38.6 % (ref 42–52)
HGB BLD-MCNC: 12.8 G/DL (ref 14–18)
MAGNESIUM SERPL-MCNC: 2.2 MG/DL (ref 1.5–2.5)
MCH RBC QN AUTO: 35.7 PG (ref 27–33)
MCHC RBC AUTO-ENTMCNC: 33.2 G/DL (ref 33.7–35.3)
MCV RBC AUTO: 107.5 FL (ref 81.4–97.8)
PHOSPHATE SERPL-MCNC: 3.7 MG/DL (ref 2.5–4.5)
PLATELET # BLD AUTO: 143 K/UL (ref 164–446)
PMV BLD AUTO: 9.1 FL (ref 9–12.9)
POTASSIUM SERPL-SCNC: 4.2 MMOL/L (ref 3.6–5.5)
RBC # BLD AUTO: 3.59 M/UL (ref 4.7–6.1)
SIGNIFICANT IND 70042: ABNORMAL
SITE SITE: ABNORMAL
SODIUM SERPL-SCNC: 142 MMOL/L (ref 135–145)
SOURCE SOURCE: ABNORMAL
WBC # BLD AUTO: 5.8 K/UL (ref 4.8–10.8)

## 2021-10-26 PROCEDURE — 700102 HCHG RX REV CODE 250 W/ 637 OVERRIDE(OP): Performed by: STUDENT IN AN ORGANIZED HEALTH CARE EDUCATION/TRAINING PROGRAM

## 2021-10-26 PROCEDURE — A9270 NON-COVERED ITEM OR SERVICE: HCPCS | Performed by: INTERNAL MEDICINE

## 2021-10-26 PROCEDURE — 700105 HCHG RX REV CODE 258: Performed by: INTERNAL MEDICINE

## 2021-10-26 PROCEDURE — 84100 ASSAY OF PHOSPHORUS: CPT

## 2021-10-26 PROCEDURE — 700111 HCHG RX REV CODE 636 W/ 250 OVERRIDE (IP): Performed by: INTERNAL MEDICINE

## 2021-10-26 PROCEDURE — 99239 HOSP IP/OBS DSCHRG MGMT >30: CPT | Performed by: STUDENT IN AN ORGANIZED HEALTH CARE EDUCATION/TRAINING PROGRAM

## 2021-10-26 PROCEDURE — 85027 COMPLETE CBC AUTOMATED: CPT

## 2021-10-26 PROCEDURE — 80048 BASIC METABOLIC PNL TOTAL CA: CPT

## 2021-10-26 PROCEDURE — 36415 COLL VENOUS BLD VENIPUNCTURE: CPT

## 2021-10-26 PROCEDURE — 700102 HCHG RX REV CODE 250 W/ 637 OVERRIDE(OP): Performed by: INTERNAL MEDICINE

## 2021-10-26 PROCEDURE — A9270 NON-COVERED ITEM OR SERVICE: HCPCS | Performed by: STUDENT IN AN ORGANIZED HEALTH CARE EDUCATION/TRAINING PROGRAM

## 2021-10-26 PROCEDURE — 83735 ASSAY OF MAGNESIUM: CPT

## 2021-10-26 RX ORDER — CIPROFLOXACIN 500 MG/1
500 TABLET, FILM COATED ORAL EVERY 12 HOURS
Status: DISCONTINUED | OUTPATIENT
Start: 2021-10-26 | End: 2021-10-26 | Stop reason: HOSPADM

## 2021-10-26 RX ORDER — CIPROFLOXACIN 750 MG/1
750 TABLET, FILM COATED ORAL 2 TIMES DAILY
Qty: 20 TABLET | Refills: 0 | Status: SHIPPED | OUTPATIENT
Start: 2021-10-26 | End: 2021-11-05

## 2021-10-26 RX ADMIN — BACLOFEN 20 MG: 10 TABLET ORAL at 09:12

## 2021-10-26 RX ADMIN — PIPERACILLIN AND TAZOBACTAM 4.5 G: 4; .5 INJECTION, POWDER, LYOPHILIZED, FOR SOLUTION INTRAVENOUS; PARENTERAL at 06:06

## 2021-10-26 RX ADMIN — SENNOSIDES 17.2 MG: 8.6 TABLET, FILM COATED ORAL at 09:12

## 2021-10-26 RX ADMIN — FERROUS SULFATE TAB 325 MG (65 MG ELEMENTAL FE) 325 MG: 325 (65 FE) TAB at 06:09

## 2021-10-26 RX ADMIN — CIPROFLOXACIN 500 MG: 500 TABLET, FILM COATED ORAL at 11:10

## 2021-10-26 RX ADMIN — ENOXAPARIN SODIUM 40 MG: 40 INJECTION SUBCUTANEOUS at 06:11

## 2021-10-26 RX ADMIN — DOCUSATE SODIUM 200 MG: 100 CAPSULE, LIQUID FILLED ORAL at 06:10

## 2021-10-26 ASSESSMENT — PAIN DESCRIPTION - PAIN TYPE
TYPE: ACUTE PAIN
TYPE: ACUTE PAIN

## 2021-10-26 NOTE — DISCHARGE PLANNING
Anticipated Discharge Disposition: Home    Action: Discussed pt in morning rounds. Per MD, pt is medically cleared.     LSW faxed HH choice form to resume services with [facility] to [facility].     LSW faxed transport forms to [facility], requested [facility] 1130.     Per chart review, [facility] accepted.    Barriers to Discharge: None    Plan: LSW to follow up on transport time.    0918: Ride confirmed for 1130 home via [facility]. LSW notified Dr. Cortes and bedside RN Alejandra.     1140: LSW received VM from Doc with [facility]. Doc requested skilled nursing be added to the HH order and face to face. LSW requested Dr. Cortes add skilled nursing to the face to face and order.    1213: Face to face is updated. LSW requested [facility] send updated referral to [facility].

## 2021-10-26 NOTE — DISCHARGE PLANNING
Received Choice form at 4341  Agency/Facility Name: [facility]   Referral sent per Choice form @ [phone]

## 2021-10-26 NOTE — DISCHARGE PLANNING
DC Transport Scheduled    Received request at: 0900    Transport Company Scheduled:   Spoke with Lisa dowd [facility] to schedule transport.    Scheduled Date: 10/26/2021  Scheduled Time: 1130    Destination: [facility]    Notified care team of scheduled transport via [employer].

## 2021-10-26 NOTE — DISCHARGE INSTRUCTIONS
Urinary Tract Infection, Adult       A urinary tract infection (UTI) is an infection of any part of the urinary tract. The urinary tract includes the kidneys, ureters, bladder, and urethra. These organs make, store, and get rid of urine in the body.  Your health care provider may use other names to describe the infection. An upper UTI affects the ureters and kidneys (pyelonephritis). A lower UTI affects the bladder (cystitis) and urethra (urethritis).  What are the causes?  Most urinary tract infections are caused by bacteria in your genital area, around the entrance to your urinary tract (urethra). These bacteria grow and cause inflammation of your urinary tract.  What increases the risk?  You are more likely to develop this condition if:  · You have a urinary catheter that stays in place (indwelling).  · You are not able to control when you urinate or have a bowel movement (you have incontinence).  · You are female and you:  ? Use a spermicide or diaphragm for birth control.  ? Have low estrogen levels.  ? Are pregnant.  · You have certain genes that increase your risk (genetics).  · You are sexually active.  · You take antibiotic medicines.  · You have a condition that causes your flow of urine to slow down, such as:  ? An enlarged prostate, if you are male.  ? Blockage in your urethra (stricture).  ? A kidney stone.  ? A nerve condition that affects your bladder control (neurogenic bladder).  ? Not getting enough to drink, or not urinating often.  · You have certain medical conditions, such as:  ? Diabetes.  ? A weak disease-fighting system (immunesystem).  ? Sickle cell disease.  ? Gout.  ? Spinal cord injury.  What are the signs or symptoms?  Symptoms of this condition include:  · Needing to urinate right away (urgently).  · Frequent urination or passing small amounts of urine frequently.  · Pain or burning with urination.  · Blood in the urine.  · Urine that smells bad or unusual.  · Trouble  urinating.  · Cloudy urine.  · Vaginal discharge, if you are female.  · Pain in the abdomen or the lower back.  You may also have:  · Vomiting or a decreased appetite.  · Confusion.  · Irritability or tiredness.  · A fever.  · Diarrhea.  The first symptom in older adults may be confusion. In some cases, they may not have any symptoms until the infection has worsened.  How is this diagnosed?  This condition is diagnosed based on your medical history and a physical exam. You may also have other tests, including:  · Urine tests.  · Blood tests.  · Tests for sexually transmitted infections (STIs).  If you have had more than one UTI, a cystoscopy or imaging studies may be done to determine the cause of the infections.  How is this treated?  Treatment for this condition includes:  · Antibiotic medicine.  · Over-the-counter medicines to treat discomfort.  · Drinking enough water to stay hydrated.  If you have frequent infections or have other conditions such as a kidney stone, you may need to see a health care provider who specializes in the urinary tract (urologist).  In rare cases, urinary tract infections can cause sepsis. Sepsis is a life-threatening condition that occurs when the body responds to an infection. Sepsis is treated in the hospital with IV antibiotics, fluids, and other medicines.  Follow these instructions at home:       Medicines  · Take over-the-counter and prescription medicines only as told by your health care provider.  · If you were prescribed an antibiotic medicine, take it as told by your health care provider. Do not stop using the antibiotic even if you start to feel better.  General instructions  · Make sure you:  ? Empty your bladder often and completely. Do not hold urine for long periods of time.  ? Empty your bladder after sex.  ? Wipe from front to back after a bowel movement if you are female. Use each tissue one time when you wipe.  · Drink enough fluid to keep your urine pale  yellow.  · Keep all follow-up visits as told by your health care provider. This is important.  Contact a health care provider if:  · Your symptoms do not get better after 1-2 days.  · Your symptoms go away and then return.  Get help right away if you have:  · Severe pain in your back or your lower abdomen.  · A fever.  · Nausea or vomiting.  Summary  · A urinary tract infection (UTI) is an infection of any part of the urinary tract, which includes the kidneys, ureters, bladder, and urethra.  · Most urinary tract infections are caused by bacteria in your genital area, around the entrance to your urinary tract (urethra).  · Treatment for this condition often includes antibiotic medicines.  · If you were prescribed an antibiotic medicine, take it as told by your health care provider. Do not stop using the antibiotic even if you start to feel better.  · Keep all follow-up visits as told by your health care provider. This is important.  This information is not intended to replace advice given to you by your health care provider. Make sure you discuss any questions you have with your health care provider.  Document Released: 09/27/2006 Document Revised: 12/05/2019 Document Reviewed: 06/27/2019  [employer] Patient Education © 2020 [employer]     Discharge Instructions    Discharged to group home by ambulance with self. Discharged via ambulance, hospital escort: Yes.  Special equipment needed: Not Applicable    Be sure to schedule a follow-up appointment with your primary care doctor or any specialists as instructed.     Discharge Plan:   Diet Plan: Discussed  Activity Level: Discussed  Confirmed Follow up Appointment: Patient to Call and Schedule Appointment  Confirmed Symptoms Management: Discussed  Medication Reconciliation Updated: Yes    I understand that a diet low in cholesterol, fat, and sodium is recommended for good health. Unless I have been given specific instructions below for another diet, I accept this  instruction as my diet prescription.   Other diet: Regular    Special Instructions: None    · Is patient discharged on Warfarin / Coumadin?   No     Depression / Suicide Risk    As you are discharged from this [facility] facility, it is important to learn how to keep safe from harming yourself.    Recognize the warning signs:  · Abrupt changes in personality, positive or negative- including increase in energy   · Giving away possessions  · Change in eating patterns- significant weight changes-  positive or negative  · Change in sleeping patterns- unable to sleep or sleeping all the time   · Unwillingness or inability to communicate  · Depression  · Unusual sadness, discouragement and loneliness  · Talk of wanting to die  · Neglect of personal appearance   · Rebelliousness- reckless behavior  · Withdrawal from people/activities they love  · Confusion- inability to concentrate     If you or a loved one observes any of these behaviors or has concerns about self-harm, here's what you can do:  · Talk about it- your feelings and reasons for harming yourself  · Remove any means that you might use to hurt yourself (examples: pills, rope, extension cords, firearm)  · Get professional help from the community (Mental Health, Substance Abuse, psychological counseling)  · Do not be alone:Call your Safe Contact- someone whom you trust who will be there for you.  · Call your local CRISIS HOTLINE 447-2477 or 101-623-9183  · Call your local Children's Mobile Crisis Response Team [place] (662) 527-1024 or www.[employer]  · Call the toll free National Suicide Prevention Hotlines   · National Suicide Prevention Lifeline 845-637-XPFA (1741)  · National Hope Line Network 800-SUICIDE (589-9947)

## 2021-10-26 NOTE — DISCHARGE SUMMARY
Discharge Summary    CHIEF COMPLAINT ON ADMISSION  Chief Complaint   Patient presents with   • Fever     pt reports fever started this AM, T-max 100.7 at home.        Reason for Admission  ems SM 9     Admission Date  10/23/2021    CODE STATUS  Full Code    HPI & HOSPITAL COURSE  Osvaldo Pate is a 71 y.o. male with C5C7 incomplete quadriplegia, ostomy, neurogenic bladder with suprapubic cath, HTN who presented 10/23/2021 with fever. He's had abd fullness past 2 days which is usually when he feels a UTI. This morning he had temp of 100.9. He denies N/V, CP, SOB. He's had a history of recurrent UTIs, last urine culture grew pseudomonas resistant to cipro. He had suprapubic cath placed 4/2021, his home health nurse changes in every 30 days, last got changed 10/12. In the ED his urine is positive for infection  The patient was admitted for further evaluation and management of complicated cystitis. Patient does have a history of recurrent UTIs associated with chronic suprapubic catheter which was placed for neurogenic bladder given his history of paraplegia. He was on IV zosyn while pending repeat urine cultures. Urine culture growing Pseudomonas - pan-sensitive. Patient was switched to ciprofloxacin.   He was evaluated by PT/OT with current recommendations for home health which was arranged.    Therefore, he is discharged in fair and stable condition to home with organized home healthcare and close outpatient follow-up.    The patient met 2-midnight criteria for an inpatient stay at the time of discharge.    Discharge Date  10/26/2021    FOLLOW UP ITEMS POST DISCHARGE  Follow up with PCP    DISCHARGE DIAGNOSES  Principal Problem:    Acute cystitis without hematuria POA: Unknown  Active Problems:    Paroxysmal atrial flutter (HCC) POA: Yes    Essential hypertension POA: Yes    Chronic incomplete quadriplegia (HCC) POA: Yes    Neurogenic bladder POA: Yes    Anemia POA: Yes    S/P colostomy (HCC) POA: Yes     Decubitus ulcer POA: Yes    Quadriplegia, C5-C7 complete (HCC) POA: Yes  Resolved Problems:    * No resolved hospital problems. *      FOLLOW UP  Future Appointments   Date Time Provider Department Center   11/16/2021 10:30 AM REMOTE MONITORING - [facility] None   12/17/2021 10:15 AM REMOTE MONITORING - [facility] None   1/18/2022  9:15 AM REMOTE MONITORING - [facility] None   2/22/2022 11:15 AM REMOTE MONITORING - [facility] None   3/23/2022 10:45 AM REMOTE MONITORING - [facility] None     No follow-up provider specified.    MEDICATIONS ON DISCHARGE     Medication List      START taking these medications      Instructions   ciprofloxacin 750 MG Tabs  Commonly known as: CIPRO   Take 1 Tablet by mouth 2 times a day for 10 days.  Dose: 750 mg        CONTINUE taking these medications      Instructions   acetaminophen 500 MG Tabs  Commonly known as: TYLENOL   Take 500-1,000 mg by mouth every four hours as needed. Takes 1000 mg twice daily then 500 mg every 4 hours as needed (3grams per 24 hours)  Dose: 500-1,000 mg     amLODIPine 10 MG Tabs  Commonly known as: NORVASC   Take 10 mg by mouth every day.  Dose: 10 mg     ascorbic acid 500 MG Tabs  Commonly known as: ascorbic acid   Take 500 mg by mouth 2 (two) times a day.  Dose: 500 mg     baclofen 20 MG tablet  Commonly known as: LIORESAL   Take 20 mg by mouth 4 times a day.  Dose: 20 mg     docusate sodium 100 MG Caps  Commonly known as: COLACE   Take 200 mg by mouth 2 times a day.  Dose: 200 mg     ferrous sulfate 325 (65 Fe) MG tablet   Take 325 mg by mouth 2 Times a Day.  Dose: 325 mg     losartan 50 MG Tabs  Commonly known as: COZAAR   Take 50 mg by mouth every day.  Dose: 50 mg     magnesium oxide 400 MG Tabs tablet  Commonly known as: MAG-OX   Take 400 mg by mouth every day.  Dose: 400 mg     melatonin 5 mg Tabs   Take 10 mg by mouth at bedtime.  Dose: 10 mg     Non Formulary Request   Take 2 Tablets by mouth 2 times a day. Mannose/Cranberry 900mg/500mg  Dose: 2  "Tablet     polyethylene glycol/lytes 17 g Pack  Commonly known as: MIRALAX   Take 17 g by mouth every day.  Dose: 17 g     PROBIOTIC PO   Take 1 Tab by mouth every day.  Dose: 1 Tablet     sennosides 8.6 MG Tabs  Commonly known as: SENOKOT   Take 17.2 mg by mouth 2 times a day.  Dose: 17.2 mg     therapeutic multivitamin-minerals Tabs   Take 1 Tab by mouth every day.  Dose: 1 Tablet     vitamin D 2000 UNIT Tabs   Take 2,000 Units by mouth every day.  Dose: 2,000 Units     zinc sulfate 220 (50 Zn) MG Caps  Commonly known as: ZINCATE   Take 220 mg by mouth every day.  Dose: 220 mg        STOP taking these medications    cephALEXin 250 MG Caps  Commonly known as: KEFLEX            Allergies  Allergies   Allergen Reactions   • Sulfa Drugs Rash     Rash, developed this back in 2015 after being placed on \"sulfa antibiotic for my wound\". Antibiotic was stopped and rash went away. Patient states he had a sulfa antibiotic prior to that time back when he was younger w/o a reaction.          DIET  Orders Placed This Encounter   Procedures   • Diet Order Diet: Regular     Standing Status:   Standing     Number of Occurrences:   1     Order Specific Question:   Diet:     Answer:   Regular [1]       ACTIVITY  As tolerated.  Weight bearing as tolerated    CONSULTATIONS  None    PROCEDURES  None    LABORATORY  Lab Results   Component Value Date    SODIUM 142 10/26/2021    POTASSIUM 4.2 10/26/2021    CHLORIDE 107 10/26/2021    CO2 22 10/26/2021    GLUCOSE 95 10/26/2021    BUN 15 10/26/2021    CREATININE 0.64 10/26/2021        Lab Results   Component Value Date    WBC 5.8 10/26/2021    HEMOGLOBIN 12.8 (L) 10/26/2021    HEMATOCRIT 38.6 (L) 10/26/2021    PLATELETCT 143 (L) 10/26/2021        Total time of the discharge process exceeds 35 minutes.  "

## 2021-10-26 NOTE — CARE PLAN
The patient is Watcher - Medium risk of patient condition declining or worsening    Shift Goals  Clinical Goals: Patient will be remain free of falls and injury  Patient Goals: Rest and sleep  Family Goals: No family present    Progress made toward(s) clinical / shift goals:  yes    Patient is not progressing towards the following goals:

## 2021-10-27 NOTE — PROGRESS NOTES
Discharging patient home per MD orders. Patient demonstrated understanding of discharge instructions and educational materials, as well as, follow up appointments, medications, prescriptions and home care for colostomy and suprapubic catheter. Patient is voiding without difficulty, pain is well controlled, tolerating oral medications. O2 is greater than 90%. All questions have been answered. Patient has been discharged off the unit with a [facility] escort.

## 2021-10-28 LAB
BACTERIA BLD CULT: NORMAL
BACTERIA BLD CULT: NORMAL
SIGNIFICANT IND 70042: NORMAL
SIGNIFICANT IND 70042: NORMAL
SITE SITE: NORMAL
SITE SITE: NORMAL
SOURCE SOURCE: NORMAL
SOURCE SOURCE: NORMAL

## 2021-11-11 ENCOUNTER — OFFICE VISIT (OUTPATIENT)
Dept: WOUND CARE | Facility: MEDICAL CENTER | Age: 71
End: 2021-11-11
Attending: NURSE PRACTITIONER
Payer: MEDICARE

## 2021-11-11 VITALS
TEMPERATURE: 98.6 F | OXYGEN SATURATION: 98 % | HEART RATE: 62 BPM | DIASTOLIC BLOOD PRESSURE: 84 MMHG | RESPIRATION RATE: 20 BRPM | SYSTOLIC BLOOD PRESSURE: 138 MMHG

## 2021-11-11 DIAGNOSIS — G82.53 QUADRIPLEGIA, C5-C7, COMPLETE (HCC): ICD-10-CM

## 2021-11-11 DIAGNOSIS — S21.209A OPEN WOUND OF BACK, UNSPECIFIED LATERALITY, INITIAL ENCOUNTER: Primary | ICD-10-CM

## 2021-11-11 PROCEDURE — 99214 OFFICE O/P EST MOD 30 MIN: CPT | Mod: 25 | Performed by: NURSE PRACTITIONER

## 2021-11-11 PROCEDURE — 11042 DBRDMT SUBQ TIS 1ST 20SQCM/<: CPT | Performed by: NURSE PRACTITIONER

## 2021-11-11 PROCEDURE — 11042 DBRDMT SUBQ TIS 1ST 20SQCM/<: CPT

## 2021-11-11 PROCEDURE — 99213 OFFICE O/P EST LOW 20 MIN: CPT | Mod: 25

## 2021-11-11 ASSESSMENT — ENCOUNTER SYMPTOMS
COUGH: 0
SHORTNESS OF BREATH: 0
VOMITING: 0
WHEEZING: 0
PALPITATIONS: 0
DOUBLE VISION: 0
BLURRED VISION: 0
DIZZINESS: 0
FEVER: 0
CHILLS: 0
HEADACHES: 0
NAUSEA: 0
WEAKNESS: 0

## 2021-11-11 NOTE — PROGRESS NOTES
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  Patient developed a stage IV pressure ulcer to the buttocks.  Patient has undergone plastic surgery with Dr. Moon who the patient reports performed a flap surgery.  Unfortunately the wound did not progress to heal and at one point the patient had significant tunneling which was packed with black foam from the initiation of a wound VAC.  The wound VAC has been off for approximately 6 months however, there was black foam the left in the wound bed.  Patient presents to [facility] today for exam of the wound.     Past Medical History:   Diagnosis Date   • A-fib (HCC)    • Atrial flutter (HCC)    • Blood clotting disorder (HCC)     Patient is on Xarelto   • Bowel habit changes     Colostomy   • GERD (gastroesophageal reflux disease)    • Hypertension    • Neurogenic bladder    • Quadriplegia, C5-C7 complete (HCC)          TOBACCO USE: Former smoker quit in 1977 1 pack/day      Patient's problem list, allergies, and current medications reviewed and updated in Epic    Interval History:  11/11/2021: Clinic visit with ADILSON Flores.  Patient states that overall he is feeling well.  Unfortunately approximately 6 months ago patient's wound VAC was DC'd.  However, black foam was left in the wound bed.  Patient has presented with visible black foam adhered within the open wound.  I tried to remove the foam with forceps and a curette however, there was still visible black foam to the wound bed with granulation tissue formed into black foam.      REVIEW OF SYSTEMS:   Review of Systems   Constitutional: Negative for chills and fever.   HENT: Negative for hearing loss.    Eyes: Negative for blurred vision  and double vision.   Respiratory: Negative for cough, shortness of breath and wheezing.    Cardiovascular: Negative for chest pain and palpitations.   Gastrointestinal: Negative for nausea and vomiting.   Skin: Negative for itching and rash.        Full-thickness wound underneath previous flap placement   Neurological: Negative for dizziness, weakness and headaches.       PHYSICAL EXAMINATION:   /84   Pulse 62   Temp 37 °C (98.6 °F) (Temporal)   Resp 20   SpO2 98% Comment: room air    Physical Exam  Constitutional:       Appearance: Normal appearance.   HENT:      Head: Normocephalic and atraumatic.   Eyes:      Pupils: Pupils are equal, round, and reactive to light.   Cardiovascular:      Rate and Rhythm: Normal rate.      Pulses: Normal pulses.   Pulmonary:      Effort: Pulmonary effort is normal. No respiratory distress.      Breath sounds: No wheezing.   Skin:     Comments: Full-thickness wound with black foam grown into tissue.   Neurological:      Mental Status: He is alert and oriented to person, place, and time.      Comments: Incomplete quadriplegic   Psychiatric:         Mood and Affect: Mood normal.         Behavior: Behavior normal.         WOUND ASSESSMENT  Wound 12/18/20 Pressure Injury Heel Posterior Left POA (Active)   Number of days: 328       Wound 12/18/20 Partial Thickness Wound Anterior;Lower Bilateral (Active)   Number of days: 328       Wound 12/20/20 Incision Sacrum (Active)   Number of days: 326       Wound 10/23/21 Pressure Injury Coccyx;Sacrum Midline POA resolving St 4 (Active)   Number of days: 19       Wound 10/24/21 Pressure Injury Ankle;Heel Dorsal Left POA Unstageable (chronic) (Active)   Number of days: 18       Wound 10/24/21 Pressure Injury Pretibial Inner Left POA DTI (Active)   Number of days: 18       Wound 10/24/21 Pretibial Distal;Outer;Lateral Left POA DTI (Active)   Number of days: 18       Wound 11/11/21 Full Thickness Wound Sacrum non healing surgical wound  (Active)   Wound Image    11/11/21 1100   Site Assessment Pink;Red;Grey;Other (Comment) 11/11/21 1100   Periwound Assessment Red;Other (Comment) 11/11/21 1100   Margins Unattached edges 11/11/21 1100   Drainage Amount Moderate 11/11/21 1100   Drainage Description Serosanguineous 11/11/21 1100   Treatments Cleansed;Provider debridement;Silver nitrate;Site care 11/11/21 1100   Wound Cleansing Normal Saline Irrigation 11/11/21 1100   Periwound Protectant Skin Protectant Wipes to Periwound;Barrier Paste 11/11/21 1100   Dressing Cleansing/Solutions Normal Saline;Hydrogen Peroxide 11/11/21 1100   Dressing Options Hydrofiber Silver;Mepilex 11/11/21 1100   Dressing Changed New 11/11/21 1100   Dressing Status Clean;Dry;Intact 11/11/21 1100   Dressing Change/Treatment Frequency Every 72 hrs, and As Needed 11/11/21 1100   Non-staged Wound Description Full thickness 11/11/21 1100   Wound Length (cm) 0.3 cm 11/11/21 1100   Wound Width (cm) 1 cm 11/11/21 1100   Wound Depth (cm) 0.3 cm 11/11/21 1100   Wound Surface Area (cm^2) 0.3 cm^2 11/11/21 1100   Wound Volume (cm^3) 0.09 cm^3 11/11/21 1100   Post-Procedure Length (cm) 0.5 cm 11/11/21 1100   Post-Procedure Width (cm) 1.6 cm 11/11/21 1100   Post-Procedure Depth (cm) 0.6 cm 11/11/21 1100   Post-Procedure Surface Area (cm^2) 0.8 cm^2 11/11/21 1100   Post-Procedure Volume (cm^3) 0.48 cm^3 11/11/21 1100   Undermining (cm) 0 cm 11/11/21 1100   Wound Odor None 11/11/21 1100   Exposed Structures KEN 11/11/21 1100   Number of days: 0       PROCEDURE:   -2% viscous lidocaine applied topically to wound bed for approximately 5 minutes prior to debridement  -Curette used to debride wound bed.  Excisional debridement was performed to remove devitalized tissue until healthy, bleeding tissue was visualized.   Entire surface of wound, 0.8 cm2 debrided.  Tissue debrided into the subcutaneous layer.    -Bleeding controlled with manual pressure.    -Wound care completed by wound RN, refer to  flowsheet  -Patient tolerated the procedure well, without c/o pain or discomfort.        Pertinent Labs and Diagnostics:    Labs:     A1c: No results found for: HBA1C       IMAGING: No results found.    VASCULAR STUDIES: No results found.    LAST  WOUND CULTURE:   Lab Results   Component Value Date/Time    CULTRSULT Usual skin ade <10,000 cfu/mL (A) 10/23/2021 11:55 AM    CULTRSULT Pseudomonas aeruginosa  >100,000 cfu/mL   (A) 10/23/2021 11:55 AM              ASSESSMENT AND PLAN:   1. Open wound of back, unspecified laterality, initial encounter  -Excisional debridement of open wound and attempts to remove black foam.  Unfortunately the black foam has been integrated into the tissue to a considerable depth.  I was therefore unable to remove all of the black foam in clinic with the use of a curette or scalpel.  -We will have the patient go back to Dr. Moon for his expertise and possible surgical removal  -Patient to return to clinic weekly for assessment and interventions  -Patient to change dressing 1-2 times per week in between clinic visits      2. Quadriplegia, C5-C7, complete (HCC)  Comments: Complicating factor.  Contributing to the patient's original stage IV ulcer leading to flap placement  -Patient has a Roho cushion to his wheelchair.  -Patient instructed he should offload with the use of a pillow rotating size.  Patient should also sleep in the side-lying or prone position.      PATIENT EDUCATION  - Importance of adequate nutrition for wound healing  -Advised to go to ER for any increased redness, swelling, drainage, or odor, or if patient develops fever, chills, nausea or vomiting.     30 min spent face to face with patient, >50% of time spent counseling, coordinating care, reviewing records, discussing POC, educating patient regarding wound healing and progression.  This time was spent in excess to procedure time.       Please note that this note may have been created using voice recognition  software. I have worked with technical experts from [facility] to optimize the interface.  I have made every reasonable attempt to correct obvious errors, but there may be errors of grammar and possibly content that I did not discover before finalizing the note.

## 2021-11-11 NOTE — PATIENT INSTRUCTIONS
-Change your dressing every 72 hours and immediately if it becomes soiled, soaked, or falls off.    -Should you experience any significant changes in your wound(s), such as infection (redness, swelling, localized heat, increased pain, fever > 101 F, chills) or have any questions regarding your home care instructions, please contact the wound center at [phone]. If after hours, contact your primary care physician or go to the hospital emergency room.

## 2021-11-12 NOTE — PROGRESS NOTES
I called Dr. Moon's office to try to establish an appointment for the patient.  However due to the veterans holiday their office was closed.  I did leave a voicemail to return my call to establish an appointment for Anjum.

## 2021-11-15 NOTE — PROGRESS NOTES
I have referred the patient to Dr. Nicolas.  I spoke with Dr. Moon's office last Friday.  They informed me that he has not seen patients get booked until next March or April even with patients he is previously seen.

## 2021-11-16 ENCOUNTER — NON-PROVIDER VISIT (OUTPATIENT)
Dept: CARDIOLOGY | Facility: MEDICAL CENTER | Age: 71
End: 2021-11-16
Payer: MEDICARE

## 2021-11-16 DIAGNOSIS — I48.92 PAROXYSMAL ATRIAL FLUTTER (HCC): ICD-10-CM

## 2021-11-16 DIAGNOSIS — Z95.818 STATUS POST PLACEMENT OF IMPLANTABLE LOOP RECORDER: ICD-10-CM

## 2021-11-18 ENCOUNTER — OFFICE VISIT (OUTPATIENT)
Dept: WOUND CARE | Facility: MEDICAL CENTER | Age: 71
End: 2021-11-18
Attending: NURSE PRACTITIONER
Payer: MEDICARE

## 2021-11-18 VITALS
TEMPERATURE: 98.9 F | SYSTOLIC BLOOD PRESSURE: 120 MMHG | HEART RATE: 71 BPM | DIASTOLIC BLOOD PRESSURE: 47 MMHG | RESPIRATION RATE: 18 BRPM | OXYGEN SATURATION: 94 %

## 2021-11-18 DIAGNOSIS — S21.209A OPEN WOUND OF BACK, UNSPECIFIED LATERALITY, INITIAL ENCOUNTER: Primary | ICD-10-CM

## 2021-11-18 DIAGNOSIS — G82.53 QUADRIPLEGIA, C5-C7, COMPLETE (HCC): ICD-10-CM

## 2021-11-18 PROCEDURE — 11042 DBRDMT SUBQ TIS 1ST 20SQCM/<: CPT | Performed by: NURSE PRACTITIONER

## 2021-11-18 PROCEDURE — 11042 DBRDMT SUBQ TIS 1ST 20SQCM/<: CPT

## 2021-11-18 PROCEDURE — 99214 OFFICE O/P EST MOD 30 MIN: CPT | Mod: 25

## 2021-11-18 PROCEDURE — 99213 OFFICE O/P EST LOW 20 MIN: CPT | Mod: 25 | Performed by: NURSE PRACTITIONER

## 2021-11-18 ASSESSMENT — ENCOUNTER SYMPTOMS
NAUSEA: 0
PALPITATIONS: 0
CHILLS: 0
WHEEZING: 0
DIZZINESS: 0
HEADACHES: 0
COUGH: 0
VOMITING: 0
BLURRED VISION: 0
SHORTNESS OF BREATH: 0
DOUBLE VISION: 0
WEAKNESS: 0
FEVER: 0

## 2021-11-18 NOTE — PROGRESS NOTES
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  Patient developed a stage IV pressure ulcer to the buttocks.  Patient has undergone plastic surgery with Dr. Moon who the patient reports performed a flap surgery.  Unfortunately the wound did not progress to heal and at one point the patient had significant tunneling which was packed with black foam from the initiation of a wound VAC.  The wound VAC has been off for approximately 6 months however, there was black foam the left in the wound bed.  Patient presents to [facility] today for exam of the wound.     Past Medical History:   Diagnosis Date   • A-fib (HCC)    • Atrial flutter (HCC)    • Blood clotting disorder (HCC)     Patient is on Xarelto   • Bowel habit changes     Colostomy   • GERD (gastroesophageal reflux disease)    • Hypertension    • Neurogenic bladder    • Quadriplegia, C5-C7 complete (HCC)          TOBACCO USE: Former smoker quit in 1977 1 pack/day      Patient's problem list, allergies, and current medications reviewed and updated in Epic    Interval History:  11/11/2021: Clinic visit with ADILSON Flores.  Patient states that overall he is feeling well.  Unfortunately approximately 6 months ago patient's wound VAC was DC'd.  However, black foam was left in the wound bed.  Patient has presented with visible black foam adhered within the open wound.  I tried to remove the foam with forceps and a curette however, there was still visible black foam to the wound bed with granulation tissue formed into black foam.    11/18/2021: Clinic visit with ADILSON Flores. Discussed with the patient that I had called Dr. Moon's office and they stated that he is not seeing any  new patients till next March or April. I stated to the office staff that he has a prior patient of Dr. Moon's however they stated that he is still not seen but into that timeframe. I did place a referral to plastics. Patient has been authorized. Patient instructed that he can contact Dr. Nicolas's office to establish as a patient. He stated that he is going to communicate with Dr. Moon and and try to be seen by him. If these options do not work I have the patient come to LPS rounds for possible surgical debridement and removal of embedded foam.    Patient to call when he has established with plastics or if he is unable to establish then we will proceed with LPS rounds.      REVIEW OF SYSTEMS:   Review of Systems   Constitutional: Negative for chills and fever.   HENT: Negative for hearing loss.    Eyes: Negative for blurred vision and double vision.   Respiratory: Negative for cough, shortness of breath and wheezing.    Cardiovascular: Negative for chest pain and palpitations.   Gastrointestinal: Negative for nausea and vomiting.   Skin: Negative for itching and rash.        Full-thickness wound underneath previous flap placement   Neurological: Negative for dizziness, weakness and headaches.       PHYSICAL EXAMINATION:   /47 (BP Location: Left arm, Patient Position: Sitting)   Pulse 71   Temp 37.2 °C (98.9 °F) (Temporal)   Resp 18   SpO2 94%     Physical Exam  Constitutional:       Appearance: Normal appearance.   HENT:      Head: Normocephalic and atraumatic.   Eyes:      Pupils: Pupils are equal, round, and reactive to light.   Cardiovascular:      Rate and Rhythm: Normal rate.      Pulses: Normal pulses.   Pulmonary:      Effort: Pulmonary effort is normal. No respiratory distress.      Breath sounds: No wheezing.   Skin:     Comments: Full-thickness wound with black foam grown into tissue.   Neurological:      Mental Status: He is alert and oriented to person, place, and time.      Comments:  Incomplete quadriplegic   Psychiatric:         Mood and Affect: Mood normal.         Behavior: Behavior normal.         WOUND ASSESSMENT  Wound 12/18/20 Pressure Injury Heel Posterior Left POA (Active)   Number of days: 335       Wound 12/18/20 Partial Thickness Wound Anterior;Lower Bilateral (Active)   Number of days: 335       Wound 12/20/20 Incision Sacrum (Active)   Number of days: 333       Wound 10/23/21 Pressure Injury Coccyx;Sacrum Midline POA resolving St 4 (Active)   Number of days: 26       Wound 10/24/21 Pressure Injury Ankle;Heel Dorsal Left POA Unstageable (chronic) (Active)   Number of days: 25       Wound 10/24/21 Pressure Injury Pretibial Inner Left POA DTI (Active)   Number of days: 25       Wound 10/24/21 Pretibial Distal;Outer;Lateral Left POA DTI (Active)   Number of days: 25       Wound 11/11/21 Full Thickness Wound Sacrum non healing surgical wound (Active)   Wound Image    11/18/21 1430   Site Assessment Pink;Red;Grey;Other (Comment) 11/18/21 1430   Periwound Assessment Scar tissue 11/18/21 1430   Margins Unattached edges 11/18/21 1430   Drainage Amount Moderate 11/18/21 1430   Drainage Description Serosanguineous 11/18/21 1430   Treatments Cleansed 11/18/21 1430   Wound Cleansing Normal Saline Irrigation 11/18/21 1430   Periwound Protectant Skin Protectant Wipes to Periwound;Barrier Paste 11/18/21 1430   Dressing Cleansing/Solutions Not Applicable 11/18/21 1430   Dressing Options Hydrofiber Silver;Mepilex 11/18/21 1430   Dressing Changed Changed 11/18/21 1430   Dressing Status Clean;Dry;Intact 11/11/21 1100   Dressing Change/Treatment Frequency Every 72 hrs, and As Needed 11/18/21 1430   Non-staged Wound Description Full thickness 11/18/21 1430   Wound Length (cm) 0.6 cm 11/18/21 1430   Wound Width (cm) 1.3 cm 11/18/21 1430   Wound Depth (cm) 0.6 cm 11/18/21 1430   Wound Surface Area (cm^2) 0.78 cm^2 11/18/21 1430   Wound Volume (cm^3) 0.468 cm^3 11/18/21 1430   Post-Procedure Length (cm)  0.8 cm 11/18/21 1430   Post-Procedure Width (cm) 2 cm 11/18/21 1430   Post-Procedure Depth (cm) 0.6 cm 11/18/21 1430   Post-Procedure Surface Area (cm^2) 1.6 cm^2 11/18/21 1430   Post-Procedure Volume (cm^3) 0.96 cm^3 11/18/21 1430   Wound Healing % -420 11/18/21 1430   Undermining (cm) 0 cm 11/18/21 1430   Wound Odor None 11/18/21 1430   Exposed Structures KEN 11/18/21 1430   Number of days: 7       PROCEDURE:   -2% viscous lidocaine applied topically to wound bed for approximately 5 minutes prior to debridement  -Curette used to debride wound bed.  Excisional debridement was performed to remove devitalized tissue until healthy, bleeding tissue was visualized.   Entire surface of wound, 1.6 cm2 debrided.  Tissue debrided into the subcutaneous layer.    -Bleeding controlled with manual pressure.    -Wound care completed by wound RN, refer to flowsheet  -Patient tolerated the procedure well, without c/o pain or discomfort.        Pertinent Labs and Diagnostics:    Labs:     A1c: No results found for: HBA1C       IMAGING: No results found.    VASCULAR STUDIES: No results found.    LAST  WOUND CULTURE:   Lab Results   Component Value Date/Time    CULTRSULT Usual skin ade <10,000 cfu/mL (A) 10/23/2021 11:55 AM    CULTRSULT Pseudomonas aeruginosa  >100,000 cfu/mL   (A) 10/23/2021 11:55 AM              ASSESSMENT AND PLAN:   1. Open wound of back, unspecified laterality, initial encounter  -Excisional debridement for removal of slough to prevent bacterial buildup.  -See interval history regarding plastics consult  -Patient to return to clinic in 2 weeks.  -Home health to change dressing 1-2 times in between clinic visit.      2. Quadriplegia, C5-C7, complete (HCC)  Comments: Complicating factor.  Contributing to the patient's original stage IV ulcer leading to flap placement  -Patient has a Roho cushion to his wheelchair.  -Patient instructed he should offload with the use of a pillow rotating size.  Patient should also  sleep in the side-lying or prone position.      PATIENT EDUCATION  - Importance of adequate nutrition for wound healing  -Advised to go to ER for any increased redness, swelling, drainage, or odor, or if patient develops fever, chills, nausea or vomiting.     20 min spent face to face with patient, >50% of time spent counseling, coordinating care, reviewing records, discussing POC, educating patient regarding wound healing and progression.  This time was spent in excess to procedure time.       Please note that this note may have been created using voice recognition software. I have worked with technical experts from [employer] to optimize the interface.  I have made every reasonable attempt to correct obvious errors, but there may be errors of grammar and possibly content that I did not discover before finalizing the note.

## 2021-11-18 NOTE — PATIENT INSTRUCTIONS
-Keep your wound dressing clean, dry, and intact.    -Change your dressing if it becomes soiled, soaked, or falls off.    -Should you experience any significant changes in your wound(s), such as infection (redness, swelling, localized heat, increased pain, fever > 101 F, chills) or have any questions regarding your home care instructions, please contact the wound center at [phone]. If after hours, contact your primary care physician or go to the hospital emergency room.

## 2021-12-02 ENCOUNTER — OFFICE VISIT (OUTPATIENT)
Dept: WOUND CARE | Facility: MEDICAL CENTER | Age: 71
End: 2021-12-02
Attending: NURSE PRACTITIONER
Payer: MEDICARE

## 2021-12-02 DIAGNOSIS — S21.209A OPEN WOUND OF BACK, UNSPECIFIED LATERALITY, INITIAL ENCOUNTER: ICD-10-CM

## 2021-12-02 RX ORDER — CIPROFLOXACIN 250 MG/1
TABLET, FILM COATED ORAL
COMMUNITY
End: 2021-12-15

## 2021-12-09 ENCOUNTER — HOSPITAL ENCOUNTER (OUTPATIENT)
Facility: MEDICAL CENTER | Age: 71
End: 2021-12-09
Attending: PHYSICIAN ASSISTANT
Payer: MEDICARE

## 2021-12-09 PROCEDURE — 87077 CULTURE AEROBIC IDENTIFY: CPT | Mod: 91

## 2021-12-09 PROCEDURE — 87070 CULTURE OTHR SPECIMN AEROBIC: CPT

## 2021-12-09 PROCEDURE — 87205 SMEAR GRAM STAIN: CPT

## 2021-12-09 PROCEDURE — 87186 SC STD MICRODIL/AGAR DIL: CPT | Mod: 91

## 2021-12-10 LAB
GRAM STN SPEC: NORMAL
SIGNIFICANT IND 70042: NORMAL
SITE SITE: NORMAL
SOURCE SOURCE: NORMAL

## 2021-12-13 LAB
BACTERIA WND AEROBE CULT: ABNORMAL
GRAM STN SPEC: ABNORMAL
SIGNIFICANT IND 70042: ABNORMAL
SITE SITE: ABNORMAL
SOURCE SOURCE: ABNORMAL

## 2021-12-14 ENCOUNTER — HOSPITAL ENCOUNTER (OUTPATIENT)
Dept: RADIOLOGY | Facility: MEDICAL CENTER | Age: 71
DRG: 659 | End: 2021-12-14
Attending: PHYSICIAN ASSISTANT
Payer: MEDICARE

## 2021-12-14 DIAGNOSIS — N31.9 NEUROMUSCULAR DYSFUNCTION OF BLADDER: ICD-10-CM

## 2021-12-14 PROCEDURE — 700117 HCHG RX CONTRAST REV CODE 255: Performed by: PHYSICIAN ASSISTANT

## 2021-12-14 PROCEDURE — 74176 CT ABD & PELVIS W/O CONTRAST: CPT | Mod: MH

## 2021-12-14 RX ADMIN — IOHEXOL 50 ML: 240 INJECTION, SOLUTION INTRATHECAL; INTRAVASCULAR; INTRAVENOUS; ORAL at 16:13

## 2021-12-15 ENCOUNTER — APPOINTMENT (OUTPATIENT)
Dept: RADIOLOGY | Facility: MEDICAL CENTER | Age: 71
DRG: 659 | End: 2021-12-15
Attending: EMERGENCY MEDICINE
Payer: MEDICARE

## 2021-12-15 ENCOUNTER — HOSPITAL ENCOUNTER (INPATIENT)
Facility: MEDICAL CENTER | Age: 71
LOS: 3 days | DRG: 659 | End: 2021-12-18
Attending: EMERGENCY MEDICINE | Admitting: INTERNAL MEDICINE
Payer: MEDICARE

## 2021-12-15 DIAGNOSIS — G82.50 CHRONIC INCOMPLETE QUADRIPLEGIA (HCC): ICD-10-CM

## 2021-12-15 DIAGNOSIS — N39.0 FREQUENT UTI: ICD-10-CM

## 2021-12-15 DIAGNOSIS — N39.0 URINARY TRACT INFECTION ASSOCIATED WITH CATHETERIZATION OF URINARY TRACT, UNSPECIFIED INDWELLING URINARY CATHETER TYPE, INITIAL ENCOUNTER (HCC): ICD-10-CM

## 2021-12-15 DIAGNOSIS — N20.1 URETEROLITHIASIS: ICD-10-CM

## 2021-12-15 DIAGNOSIS — L89.154 PRESSURE INJURY OF SACRAL REGION, STAGE 4 (HCC): ICD-10-CM

## 2021-12-15 DIAGNOSIS — T83.511A URINARY TRACT INFECTION ASSOCIATED WITH CATHETERIZATION OF URINARY TRACT, UNSPECIFIED INDWELLING URINARY CATHETER TYPE, INITIAL ENCOUNTER (HCC): ICD-10-CM

## 2021-12-15 PROBLEM — N13.9 OBSTRUCTIVE UROPATHY: Status: ACTIVE | Noted: 2021-12-15

## 2021-12-15 LAB
ALBUMIN SERPL BCP-MCNC: 3.8 G/DL (ref 3.2–4.9)
ALBUMIN/GLOB SERPL: 1.2 G/DL
ALP SERPL-CCNC: 63 U/L (ref 30–99)
ALT SERPL-CCNC: 11 U/L (ref 2–50)
ANION GAP SERPL CALC-SCNC: 12 MMOL/L (ref 7–16)
APPEARANCE UR: CLEAR
APTT PPP: 32.6 SEC (ref 24.7–36)
AST SERPL-CCNC: 16 U/L (ref 12–45)
BACTERIA #/AREA URNS HPF: ABNORMAL /HPF
BASOPHILS # BLD AUTO: 0.3 % (ref 0–1.8)
BASOPHILS # BLD: 0.02 K/UL (ref 0–0.12)
BILIRUB SERPL-MCNC: 0.6 MG/DL (ref 0.1–1.5)
BILIRUB UR QL STRIP.AUTO: NEGATIVE
BUN SERPL-MCNC: 14 MG/DL (ref 8–22)
CALCIUM SERPL-MCNC: 8.9 MG/DL (ref 8.4–10.2)
CHLORIDE SERPL-SCNC: 109 MMOL/L (ref 96–112)
CO2 SERPL-SCNC: 20 MMOL/L (ref 20–33)
COLOR UR: YELLOW
CREAT SERPL-MCNC: 0.7 MG/DL (ref 0.5–1.4)
EKG IMPRESSION: NORMAL
EOSINOPHIL # BLD AUTO: 0.2 K/UL (ref 0–0.51)
EOSINOPHIL NFR BLD: 3.2 % (ref 0–6.9)
EPI CELLS #/AREA URNS HPF: ABNORMAL /HPF
ERYTHROCYTE [DISTWIDTH] IN BLOOD BY AUTOMATED COUNT: 51.1 FL (ref 35.9–50)
GLOBULIN SER CALC-MCNC: 3.2 G/DL (ref 1.9–3.5)
GLUCOSE SERPL-MCNC: 90 MG/DL (ref 65–99)
GLUCOSE UR STRIP.AUTO-MCNC: NEGATIVE MG/DL
HCT VFR BLD AUTO: 40.3 % (ref 42–52)
HGB BLD-MCNC: 13 G/DL (ref 14–18)
HYALINE CASTS #/AREA URNS LPF: ABNORMAL /LPF
IMM GRANULOCYTES # BLD AUTO: 0.03 K/UL (ref 0–0.11)
IMM GRANULOCYTES NFR BLD AUTO: 0.5 % (ref 0–0.9)
INR PPP: 1.16 (ref 0.87–1.13)
KETONES UR STRIP.AUTO-MCNC: NEGATIVE MG/DL
LACTATE BLD-SCNC: 1.5 MMOL/L (ref 0.5–2)
LEUKOCYTE ESTERASE UR QL STRIP.AUTO: ABNORMAL
LYMPHOCYTES # BLD AUTO: 1.64 K/UL (ref 1–4.8)
LYMPHOCYTES NFR BLD: 26.6 % (ref 22–41)
MAGNESIUM SERPL-MCNC: 2.2 MG/DL (ref 1.5–2.5)
MCH RBC QN AUTO: 33.9 PG (ref 27–33)
MCHC RBC AUTO-ENTMCNC: 32.3 G/DL (ref 33.7–35.3)
MCV RBC AUTO: 104.9 FL (ref 81.4–97.8)
MICRO URNS: ABNORMAL
MONOCYTES # BLD AUTO: 0.67 K/UL (ref 0–0.85)
MONOCYTES NFR BLD AUTO: 10.9 % (ref 0–13.4)
NEUTROPHILS # BLD AUTO: 3.61 K/UL (ref 1.82–7.42)
NEUTROPHILS NFR BLD: 58.5 % (ref 44–72)
NITRITE UR QL STRIP.AUTO: NEGATIVE
NRBC # BLD AUTO: 0 K/UL
NRBC BLD-RTO: 0 /100 WBC
PH UR STRIP.AUTO: 5.5 [PH] (ref 5–8)
PLATELET # BLD AUTO: 148 K/UL (ref 164–446)
PMV BLD AUTO: 8.8 FL (ref 9–12.9)
POTASSIUM SERPL-SCNC: 4.6 MMOL/L (ref 3.6–5.5)
PROT SERPL-MCNC: 7 G/DL (ref 6–8.2)
PROT UR QL STRIP: NEGATIVE MG/DL
PROTHROMBIN TIME: 13.9 SEC (ref 12–14.6)
RBC # BLD AUTO: 3.84 M/UL (ref 4.7–6.1)
RBC # URNS HPF: ABNORMAL /HPF
RBC UR QL AUTO: ABNORMAL
SODIUM SERPL-SCNC: 141 MMOL/L (ref 135–145)
SP GR UR STRIP.AUTO: 1.01
WBC # BLD AUTO: 6.2 K/UL (ref 4.8–10.8)
WBC #/AREA URNS HPF: ABNORMAL /HPF

## 2021-12-15 PROCEDURE — 700111 HCHG RX REV CODE 636 W/ 250 OVERRIDE (IP): Performed by: INTERNAL MEDICINE

## 2021-12-15 PROCEDURE — 87186 SC STD MICRODIL/AGAR DIL: CPT

## 2021-12-15 PROCEDURE — 81001 URINALYSIS AUTO W/SCOPE: CPT

## 2021-12-15 PROCEDURE — 85610 PROTHROMBIN TIME: CPT

## 2021-12-15 PROCEDURE — 700105 HCHG RX REV CODE 258: Performed by: EMERGENCY MEDICINE

## 2021-12-15 PROCEDURE — A9270 NON-COVERED ITEM OR SERVICE: HCPCS | Performed by: INTERNAL MEDICINE

## 2021-12-15 PROCEDURE — 96367 TX/PROPH/DG ADDL SEQ IV INF: CPT

## 2021-12-15 PROCEDURE — 80053 COMPREHEN METABOLIC PANEL: CPT

## 2021-12-15 PROCEDURE — 83735 ASSAY OF MAGNESIUM: CPT

## 2021-12-15 PROCEDURE — 700102 HCHG RX REV CODE 250 W/ 637 OVERRIDE(OP): Performed by: INTERNAL MEDICINE

## 2021-12-15 PROCEDURE — 87077 CULTURE AEROBIC IDENTIFY: CPT

## 2021-12-15 PROCEDURE — 96366 THER/PROPH/DIAG IV INF ADDON: CPT

## 2021-12-15 PROCEDURE — 770001 HCHG ROOM/CARE - MED/SURG/GYN PRIV*

## 2021-12-15 PROCEDURE — 700105 HCHG RX REV CODE 258: Performed by: INTERNAL MEDICINE

## 2021-12-15 PROCEDURE — 93005 ELECTROCARDIOGRAM TRACING: CPT | Performed by: EMERGENCY MEDICINE

## 2021-12-15 PROCEDURE — 700111 HCHG RX REV CODE 636 W/ 250 OVERRIDE (IP): Performed by: EMERGENCY MEDICINE

## 2021-12-15 PROCEDURE — 85025 COMPLETE CBC W/AUTO DIFF WBC: CPT

## 2021-12-15 PROCEDURE — 87086 URINE CULTURE/COLONY COUNT: CPT

## 2021-12-15 PROCEDURE — 99223 1ST HOSP IP/OBS HIGH 75: CPT | Mod: AI | Performed by: INTERNAL MEDICINE

## 2021-12-15 PROCEDURE — 96365 THER/PROPH/DIAG IV INF INIT: CPT

## 2021-12-15 PROCEDURE — 85730 THROMBOPLASTIN TIME PARTIAL: CPT

## 2021-12-15 PROCEDURE — 71045 X-RAY EXAM CHEST 1 VIEW: CPT

## 2021-12-15 PROCEDURE — 99285 EMERGENCY DEPT VISIT HI MDM: CPT

## 2021-12-15 PROCEDURE — 83605 ASSAY OF LACTIC ACID: CPT

## 2021-12-15 PROCEDURE — 700101 HCHG RX REV CODE 250: Performed by: EMERGENCY MEDICINE

## 2021-12-15 RX ORDER — LABETALOL HYDROCHLORIDE 5 MG/ML
10 INJECTION, SOLUTION INTRAVENOUS EVERY 6 HOURS PRN
Status: DISCONTINUED | OUTPATIENT
Start: 2021-12-15 | End: 2021-12-18 | Stop reason: HOSPADM

## 2021-12-15 RX ORDER — MULTIVIT WITH MINERALS/LUTEIN
1000 TABLET ORAL EVERY MORNING
COMMUNITY

## 2021-12-15 RX ORDER — BACLOFEN 10 MG/1
20 TABLET ORAL 4 TIMES DAILY
Status: DISCONTINUED | OUTPATIENT
Start: 2021-12-15 | End: 2021-12-18 | Stop reason: HOSPADM

## 2021-12-15 RX ORDER — POLYETHYLENE GLYCOL 3350 17 G/17G
1 POWDER, FOR SOLUTION ORAL
Status: DISCONTINUED | OUTPATIENT
Start: 2021-12-15 | End: 2021-12-18 | Stop reason: HOSPADM

## 2021-12-15 RX ORDER — AMOXICILLIN 250 MG
2 CAPSULE ORAL 2 TIMES DAILY
Status: DISCONTINUED | OUTPATIENT
Start: 2021-12-15 | End: 2021-12-18 | Stop reason: HOSPADM

## 2021-12-15 RX ORDER — LOSARTAN POTASSIUM 25 MG/1
50 TABLET ORAL DAILY
Status: DISCONTINUED | OUTPATIENT
Start: 2021-12-16 | End: 2021-12-18 | Stop reason: HOSPADM

## 2021-12-15 RX ORDER — ONDANSETRON 2 MG/ML
4 INJECTION INTRAMUSCULAR; INTRAVENOUS EVERY 4 HOURS PRN
Status: DISCONTINUED | OUTPATIENT
Start: 2021-12-15 | End: 2021-12-18 | Stop reason: HOSPADM

## 2021-12-15 RX ORDER — CHOLECALCIFEROL (VITAMIN D3) 125 MCG
10 CAPSULE ORAL
Status: DISCONTINUED | OUTPATIENT
Start: 2021-12-15 | End: 2021-12-18 | Stop reason: HOSPADM

## 2021-12-15 RX ORDER — ONDANSETRON 4 MG/1
4 TABLET, ORALLY DISINTEGRATING ORAL EVERY 4 HOURS PRN
Status: DISCONTINUED | OUTPATIENT
Start: 2021-12-15 | End: 2021-12-18 | Stop reason: HOSPADM

## 2021-12-15 RX ORDER — HEPARIN SODIUM 5000 [USP'U]/ML
5000 INJECTION, SOLUTION INTRAVENOUS; SUBCUTANEOUS EVERY 8 HOURS
Status: DISCONTINUED | OUTPATIENT
Start: 2021-12-16 | End: 2021-12-18 | Stop reason: HOSPADM

## 2021-12-15 RX ORDER — DOCUSATE SODIUM 100 MG/1
200 CAPSULE, LIQUID FILLED ORAL 2 TIMES DAILY
Status: DISCONTINUED | OUTPATIENT
Start: 2021-12-15 | End: 2021-12-18 | Stop reason: HOSPADM

## 2021-12-15 RX ORDER — ACETAMINOPHEN 325 MG/1
650 TABLET ORAL EVERY 6 HOURS PRN
Status: DISCONTINUED | OUTPATIENT
Start: 2021-12-15 | End: 2021-12-18 | Stop reason: HOSPADM

## 2021-12-15 RX ORDER — AMLODIPINE BESYLATE 5 MG/1
10 TABLET ORAL DAILY
Status: DISCONTINUED | OUTPATIENT
Start: 2021-12-16 | End: 2021-12-18 | Stop reason: HOSPADM

## 2021-12-15 RX ORDER — BISACODYL 10 MG
10 SUPPOSITORY, RECTAL RECTAL
Status: DISCONTINUED | OUTPATIENT
Start: 2021-12-15 | End: 2021-12-18 | Stop reason: HOSPADM

## 2021-12-15 RX ORDER — NITROFURANTOIN 25; 75 MG/1; MG/1
100 CAPSULE ORAL EVERY EVENING
Status: ON HOLD | COMMUNITY
End: 2021-12-18

## 2021-12-15 RX ADMIN — SENNOSIDES AND DOCUSATE SODIUM 2 TABLET: 50; 8.6 TABLET ORAL at 17:26

## 2021-12-15 RX ADMIN — DOCUSATE SODIUM 200 MG: 100 CAPSULE, LIQUID FILLED ORAL at 18:15

## 2021-12-15 RX ADMIN — BACLOFEN 20 MG: 10 TABLET ORAL at 17:26

## 2021-12-15 RX ADMIN — PIPERACILLIN AND TAZOBACTAM 3.38 G: 3; .375 INJECTION, POWDER, LYOPHILIZED, FOR SOLUTION INTRAVENOUS; PARENTERAL at 21:41

## 2021-12-15 RX ADMIN — Medication 10 MG: at 21:41

## 2021-12-15 RX ADMIN — BACLOFEN 20 MG: 10 TABLET ORAL at 21:40

## 2021-12-15 RX ADMIN — VANCOMYCIN HYDROCHLORIDE 2250 MG: 500 INJECTION, POWDER, LYOPHILIZED, FOR SOLUTION INTRAVENOUS at 13:11

## 2021-12-15 RX ADMIN — PIPERACILLIN AND TAZOBACTAM 3.38 G: 3; .375 INJECTION, POWDER, LYOPHILIZED, FOR SOLUTION INTRAVENOUS; PARENTERAL at 17:09

## 2021-12-15 ASSESSMENT — ENCOUNTER SYMPTOMS
COUGH: 0
FLANK PAIN: 0
NAUSEA: 0
BACK PAIN: 0
HEMOPTYSIS: 0
BLOOD IN STOOL: 0
CHILLS: 0
VOMITING: 0
DOUBLE VISION: 0
HEARTBURN: 0
SPEECH CHANGE: 0
BLURRED VISION: 0
FEVER: 0
SENSORY CHANGE: 1
PALPITATIONS: 0
ORTHOPNEA: 0
EYE DISCHARGE: 0
BRUISES/BLEEDS EASILY: 0
WHEEZING: 0
SHORTNESS OF BREATH: 0
HEADACHES: 0
MYALGIAS: 0
CLAUDICATION: 0
SPUTUM PRODUCTION: 0
DIZZINESS: 0
PHOTOPHOBIA: 0
WEAKNESS: 1
SORE THROAT: 0
ABDOMINAL PAIN: 0
DIARRHEA: 0
DEPRESSION: 0

## 2021-12-15 ASSESSMENT — COGNITIVE AND FUNCTIONAL STATUS - GENERAL
TOILETING: TOTAL
SUGGESTED CMS G CODE MODIFIER DAILY ACTIVITY: CL
TURNING FROM BACK TO SIDE WHILE IN FLAT BAD: A LOT
STANDING UP FROM CHAIR USING ARMS: TOTAL
PERSONAL GROOMING: A LITTLE
DRESSING REGULAR LOWER BODY CLOTHING: A LOT
SUGGESTED CMS G CODE MODIFIER MOBILITY: CM
MOBILITY SCORE: 8
WALKING IN HOSPITAL ROOM: TOTAL
CLIMB 3 TO 5 STEPS WITH RAILING: TOTAL
DAILY ACTIVITIY SCORE: 13
DRESSING REGULAR UPPER BODY CLOTHING: A LOT
HELP NEEDED FOR BATHING: A LOT
EATING MEALS: A LITTLE
MOVING FROM LYING ON BACK TO SITTING ON SIDE OF FLAT BED: UNABLE
MOVING TO AND FROM BED TO CHAIR: A LOT

## 2021-12-15 ASSESSMENT — LIFESTYLE VARIABLES
HOW MANY TIMES IN THE PAST YEAR HAVE YOU HAD 5 OR MORE DRINKS IN A DAY: 1
EVER HAD A DRINK FIRST THING IN THE MORNING TO STEADY YOUR NERVES TO GET RID OF A HANGOVER: NO
TOTAL SCORE: 0
AVERAGE NUMBER OF DAYS PER WEEK YOU HAVE A DRINK CONTAINING ALCOHOL: 1
CONSUMPTION TOTAL: POSITIVE
TOTAL SCORE: 0
HAVE PEOPLE ANNOYED YOU BY CRITICIZING YOUR DRINKING: NO
ON A TYPICAL DAY WHEN YOU DRINK ALCOHOL HOW MANY DRINKS DO YOU HAVE: 1
ALCOHOL_USE: YES
HAVE YOU EVER FELT YOU SHOULD CUT DOWN ON YOUR DRINKING: NO
TOTAL SCORE: 0
EVER FELT BAD OR GUILTY ABOUT YOUR DRINKING: NO

## 2021-12-15 ASSESSMENT — PATIENT HEALTH QUESTIONNAIRE - PHQ9
2. FEELING DOWN, DEPRESSED, IRRITABLE, OR HOPELESS: NOT AT ALL
1. LITTLE INTEREST OR PLEASURE IN DOING THINGS: NOT AT ALL
SUM OF ALL RESPONSES TO PHQ9 QUESTIONS 1 AND 2: 0

## 2021-12-15 ASSESSMENT — FIBROSIS 4 INDEX: FIB4 SCORE: 1.82

## 2021-12-15 NOTE — H&P
Hospital Medicine History & Physical Note    Date of Service  12/15/2021    Primary Care Physician  WESTON Pretty.    Consultants  urology        Code Status  Full Code    Chief Complaint  Chief Complaint   Patient presents with   • Nephrolithiasis       History of Presenting Illness  Osvaldo Pate is a 71 y.o. male who presented 12/15/2021 with C7 injury and subsequent paraplegia, decubitus ulcers, and recurrent UTIs who presents to the ED with abnormal imaging.  The patient was apparently well until the day prior to admission when he was at his urologist office and had a CT scan done.  This revealed a 9 mm ureteral stone on the left side with associated hydronephrosis.  Due to the patient's sensory deficits at baseline, he is unable to identify any suprapubic pain or abdominal pain.  He does not have any associated chest pains, palpitations, confusion, or fever/chills.  Due to his complicated history of recurrent UTIs with MRSA and Pseudomonas he was advised by his urologist to seek consult at her institution for stone removal.    Upon arrival to the ED, patient is afebrile and hemodynamically stable.  No evidence of leukocytosis or electrolyte abnormalities.  No acute renal failure noted.  We are pending urinalysis as well as urine cultures.  Urology currently with recommendations for stone removal in the morning.  He will be admitted for further evaluation and management of obstructive uropathy and complicated cystitis.    I discussed the plan of care with patient.    Review of Systems  Review of Systems   Constitutional: Negative for chills, fever and malaise/fatigue.   HENT: Negative for congestion, hearing loss, sore throat and tinnitus.    Eyes: Negative for blurred vision, double vision, photophobia and discharge.   Respiratory: Negative for cough, hemoptysis, sputum production, shortness of breath and wheezing.    Cardiovascular: Negative for chest pain, palpitations, orthopnea,  "claudication and leg swelling.   Gastrointestinal: Negative for abdominal pain, blood in stool, diarrhea, heartburn, melena, nausea and vomiting.   Genitourinary: Negative for dysuria, flank pain, hematuria and urgency.   Musculoskeletal: Negative for back pain, joint pain and myalgias.   Skin: Negative for itching and rash.   Neurological: Positive for sensory change and weakness. Negative for dizziness, speech change and headaches.        At baseline patient has motor and sensory deficit from C7 downward.     Endo/Heme/Allergies: Does not bruise/bleed easily.   Psychiatric/Behavioral: Negative for depression and suicidal ideas.       Past Medical History   has a past medical history of A-fib ([facility]), Atrial flutter ([facility]), Blood clotting disorder ([facility]), Bowel habit changes, GERD (gastroesophageal reflux disease), Hypertension, Neurogenic bladder, and Quadriplegia, C5-C7 complete ([facility] [place]).    Surgical History   has a past surgical history that includes percutaneouospinning lower extremity; colostomy; colostomy takedown; colostomy (N/A, 7/27/2019); ulcer debridement (N/A, 8/21/2019); flap closure (8/21/2019); hernia repair; and irrigation & debridement general (12/20/2020).     Family History  family history includes Heart Disease in his father.   Family history reviewed with patient. There is no family history that is pertinent to the chief complaint.     Social History   reports that he quit smoking about 44 years ago. His smoking use included cigarettes. He started smoking about 54 years ago. He smoked 1.00 pack per day. He has never used smokeless tobacco. He reports current alcohol use of about 0.6 oz of alcohol per week. He reports that he does not use drugs.    Allergies  Allergies   Allergen Reactions   • Sulfa Drugs Rash     Rash, developed this back in 2015 after being placed on \"sulfa antibiotic for my wound\". Antibiotic was stopped and rash went away. Patient states he had a sulfa antibiotic prior to that time " back when he was younger w/o a reaction.          Medications  Prior to Admission Medications   Prescriptions Last Dose Informant Patient Reported? Taking?   Ascorbic Acid (VITAMIN C) 1000 MG Tab 12/15/2021 at 0800 MAR from Other Facility Yes Yes   Sig: Take 1,000 mg by mouth every day.   Cholecalciferol (VITAMIN D) 2000 UNIT Tab 12/15/2021 at 0800 MAR from Other Facility Yes No   Sig: Take 2,000 Units by mouth every day.   Non Formulary Request 12/9/2021 at D/C MAR from Other Facility Yes No   Sig: Take 2 Tablets by mouth 2 times a day. Mannose/Cranberry 900mg/500mg   Probiotic Product (PROBIOTIC PO) 12/15/2021 at 0800 MAR from Other Facility Yes No   Sig: Take 1 Tab by mouth every day.   Zinc Sulfate 50 MG Cap 12/15/2021 at 0800 MAR from Other Facility Yes No   Sig: Take 50 mg by mouth every day.   acetaminophen (TYLENOL) 500 MG Tab 12/15/2021 at 0800 MAR from Other Facility Yes No   Sig: Take 1,000 mg by mouth 2 times a day. Takes 1000 mg twice daily then 500 mg every 4 hours as needed (3grams per 24 hours)    amLODIPine (NORVASC) 10 MG Tab 12/15/2021 at 0800 MAR from Other Facility Yes No   Sig: Take 10 mg by mouth every day.   baclofen (LIORESAL) 20 MG tablet 12/15/2021 at 0800 MAR from Other Facility Yes No   Sig: Take 20 mg by mouth 4 times a day.   docusate sodium (COLACE) 100 MG Cap 12/15/2021 at 0800 MAR from Other Facility Yes No   Sig: Take 200 mg by mouth 2 times a day.   ferrous sulfate 325 (65 Fe) MG tablet 12/15/2021 at 0800 MAR from Other Facility Yes No   Sig: Take 325 mg by mouth 2 Times a Day.   levoFLOXacin (LEVAQUIN) 750 MG tablet 11/29/2021 at FINISHED MAR from Other Facility No No   Sig: Take one tab po daily x 7 days   Patient taking differently: Take 750 mg by mouth every day. Take one tab po daily x 7 days, pt started on 11/23/2021   losartan (COZAAR) 50 MG Tab 12/14/2021 at HS MAR from Other Facility Yes No   Sig: Take 50 mg by mouth every day.   magnesium oxide (MAG-OX) 400 MG Tab tablet  12/15/2021 at 0800 MAR from Other Facility Yes No   Sig: Take 400 mg by mouth every day.   melatonin 5 mg Tab 12/14/2021 at HS MAR from Other Facility Yes No   Sig: Take 10 mg by mouth at bedtime.   nitrofurantoin (MACROBID) 100 MG Cap 12/14/2021 at 1700 MAR from Other Facility Yes Yes   Sig: Take 100 mg by mouth every evening. Per MAR, pt started on 12/2/2021 for 30 day course   polyethylene glycol/lytes (MIRALAX) 17 g Pack 12/15/2021 at 0800 MAR from Other Facility Yes No   Sig: Take 17 g by mouth every day.   sennosides (SENOKOT) 8.6 MG Tab 12/15/2021 at 0800 MAR from Other Facility Yes No   Sig: Take 17.2 mg by mouth 2 times a day.   therapeutic multivitamin-minerals (THERAGRAN-M) Tab 12/9/2021 at D/C MAR from Other Facility Yes No   Sig: Take 1 Tab by mouth every day.      Facility-Administered Medications: None       Physical Exam  Temp:  [36.7 °C (98.1 °F)] 36.7 °C (98.1 °F)  Pulse:  [50] 50  Resp:  [16] 16  BP: (186)/(72) 186/72  SpO2:  [95 %] 95 %  Blood Pressure : (!) 186/72   Temperature: 36.7 °C (98.1 °F)   Pulse: (!) 50   Respiration: 16   Pulse Oximetry: 95 %       Physical Exam  Vitals reviewed.   Constitutional:       Appearance: Normal appearance.   HENT:      Head: Normocephalic and atraumatic.      Nose: No congestion or rhinorrhea.      Mouth/Throat:      Mouth: Mucous membranes are moist.      Pharynx: Oropharynx is clear.   Eyes:      General: No scleral icterus.     Extraocular Movements: Extraocular movements intact.      Conjunctiva/sclera: Conjunctivae normal.      Pupils: Pupils are equal, round, and reactive to light.   Cardiovascular:      Rate and Rhythm: Normal rate and regular rhythm.      Pulses: Normal pulses.      Heart sounds: Normal heart sounds. No murmur heard.  No friction rub. No gallop.    Pulmonary:      Effort: Pulmonary effort is normal. No respiratory distress.      Breath sounds: Normal breath sounds. No stridor. No wheezing, rhonchi or rales.   Abdominal:      General:  Abdomen is flat. Bowel sounds are normal. There is no distension.      Palpations: Abdomen is soft. There is no mass.      Tenderness: There is no abdominal tenderness. There is no guarding or rebound.   Musculoskeletal:         General: No swelling, tenderness or deformity.      Cervical back: Neck supple. No rigidity. No muscular tenderness.   Lymphadenopathy:      Cervical: No cervical adenopathy.   Skin:     General: Skin is warm and dry.      Findings: No erythema or rash.   Neurological:      Mental Status: He is alert and oriented to person, place, and time. Mental status is at baseline.      Cranial Nerves: No cranial nerve deficit.      Sensory: Sensory deficit present.      Motor: Weakness present.      Comments: Patient with baseline motor/sensory deficits from C7 downward.    Psychiatric:         Mood and Affect: Mood normal.         Behavior: Behavior normal.         Thought Content: Thought content normal.         Laboratory:  Recent Labs     12/15/21  1230   WBC 6.2   RBC 3.84*   HEMOGLOBIN 13.0*   HEMATOCRIT 40.3*   .9*   MCH 33.9*   MCHC 32.3*   RDW 51.1*   PLATELETCT 148*   MPV 8.8*     Recent Labs     12/15/21  1140   SODIUM 141   POTASSIUM 4.6   CHLORIDE 109   CO2 20   GLUCOSE 90   BUN 14   CREATININE 0.70   CALCIUM 8.9     Recent Labs     12/15/21  1140   ALTSGPT 11   ASTSGOT 16   ALKPHOSPHAT 63   TBILIRUBIN 0.6   GLUCOSE 90     Recent Labs     12/15/21  1140   APTT 32.6   INR 1.16*     No results for input(s): NTPROBNP in the last 72 hours.      No results for input(s): TROPONINT in the last 72 hours.    Imaging:  DX-CHEST-PORTABLE (1 VIEW)    (Results Pending)       EKG:  My impression is: sinus bradycardia    Assessment/Plan:  I anticipate this patient will require at least two midnights for appropriate medical management, necessitating inpatient admission.    * Obstructive uropathy- (present on admission)  Assessment & Plan  The patient presents as a admitted from his urologist  office.  CT scan was done at that time and showed 9 mm obstructive stone in the left renal system.  Their recommendations were for stone removal tomorrow morning.  Patient has a history of frequent urinary tract infections with previous cultures growing Pseudomonas and MRSA.  At this time, patient denies any fevers or chills.  Unable to identify any suprapubic pain because of history of sensory deficit from C7 injury.  He has not noticed any change in the quality or quantity of his urine into the Cazares bag.  Due to his complicated medical history, urology with recommendations for antibiotics based on previous cultures and stone removal in the morning.  Patient without acute renal failure  -Urology with recommendations for removal of 9 mm obstructive stone in the morning  -Due to patient's complicated history and history of recurrent UTIs they recommend empiric antibiotic therapy  -Tailor antibiotics based on urinalysis as well as urine culture which is pending    Frequent UTI  Assessment & Plan  Patient states that he has recently been started on prophylactic antibiotics with nitrofurantoin and Levaquin since the first week of December  Concerns for developing sepsis in the setting of obstructive uropathy.  Urology with recommendations for empiric antibiotic therapy.  Pending results of UA and urine cultures.    Bradycardia- (present on admission)  Assessment & Plan  No heart block identified on EKG  Medications reviewed  Continue to monitor    Neurogenic bladder- (present on admission)  Assessment & Plan  Patient with suprapubic catheter which is working well  Pending final urology recommendations    Chronic incomplete quadriplegia (HCC)- (present on admission)  Assessment & Plan  Pending PT/OT  Frequent turning and wound care    Essential hypertension- (present on admission)  Assessment & Plan  Currently well controlled  Continue home dose of amlodipine and losartan    Paroxysmal atrial flutter (HCC)- (present on  admission)  Assessment & Plan  History of  Currently in sinus bradycardia  We will need to continue outpatient follow-up    Pressure injury of sacral region, stage 4 (HCC)- (present on admission)  Assessment & Plan  Wound care has been consulted  PT/OT       VTE prophylaxis: heparin ppx

## 2021-12-15 NOTE — ED NOTES
Patient turned, open wound noted to coccyx. Wound clean and dry. mepilex to site. Patient turned to left side.

## 2021-12-15 NOTE — CONSULTS
UROLOGY Consult Note:    Navneet Campuzano P.A.-C.  Date & Time note created:    12/15/2021   1:04 PM     Referring MD:  Dr. Cancino    Patient ID:   Name:             Osvaldo Pate   YOB: 1950  Age:                 71 y.o.  male   MRN:               [id number]                                                             Reason for Consult:      Ureteral stone with UTI    History of Present Illness:    Mr. 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 He was in the office today in fact when it was discovered incidentally on surveillance imaging that he had an obstructing left ureteral stone. He has had recurrent UTIs as well with recent urine culture indicating 2 organisms. He was sent to the ER from the office for management and urologic surgical intervention. He has felt his usual self with perhaps only a change in early satiety. He has not had fevers, sweats or chills. He does not feel pain in the distribution where stone pain would present.     Review of Systems:      Constitutional: Denies fevers   Eyes: Denies changes in vision   Ears/Nose/Throat/Mouth: Denies nasal congestion   Cardiovascular: no chest pain   Respiratory: no shortness of breath   Gastrointestinal/Hepatic: Denies abdominal pain   Genitourinary: Denies hematuria, dysuria or frequency  Musculoskeletal/Rheum: Denies joint pain   Skin: Denies rash  Neurological: Denies headache   Psychiatric: denies mood disorder   Endocrine: Latrice thyroid problems  Heme/Oncology/Lymph Nodes: Denies enlarged lymph nodes   All other systems were reviewed and are negative (AMA/CMS criteria)                Past Medical History:   Past Medical History:   Diagnosis Date   • A-fib (HCC)    • Atrial flutter (HCC)    • Blood clotting disorder (HCC)     Patient is on Xarelto   • Bowel habit changes     Colostomy   • GERD (gastroesophageal reflux disease)    •  Hypertension    • Neurogenic bladder    • Quadriplegia, C5-C7 complete ([facility])      Active Hospital Problems    Diagnosis    • Obstructive uropathy [N13.9]    • Frequent UTI [N39.0]    • Bradycardia [R00.1]    • Pressure injury of sacral region, 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[facility]) [L89.154]    • Paroxysmal atrial flutter ([facility]) [I48.92]    • Essential hypertension [I10]    • Chronic incomplete quadriplegia ([facility]) [G82.50]    • Neurogenic bladder [N31.9]        Past Surgical History:  Past Surgical History:   Procedure Laterality Date   • IRRIGATION & DEBRIDEMENT GENERAL  12/20/2020    Procedure: IRRIGATION AND DEBRIDEMENT, WOUND SACRAL ULCER;  Surgeon: Matt Cummins M.D.;  Location: [facility];  Service: Plastics   • ULCER DEBRIDEMENT N/A 8/21/2019    Procedure: debridement of Sacral grade 4 ulcer - W/BONE BIOPSY, 3 liter wash out. bilateral sliding gluteal myocutaneous flap advancement;  Surgeon: Amadeo Moon M.D.;  Location: [facility];  Service: Plastics   • FLAP CLOSURE  8/21/2019    Procedure: CLOSURE, FLAP - MUSCLE;  Surgeon: Amadeo Moon M.D.;  Location: [facility];  Service: Plastics   • COLOSTOMY N/A 7/27/2019    Procedure: CREATION, COLOSTOMY -  placement;  Surgeon: Elías Hannah M.D.;  Location: [facility];  Service: General   • COLOSTOMY     • COLOSTOMY TAKEDOWN     • HERNIA REPAIR     • PERCUTANEOUOSPINNING LOWER EXTREMITY         Hospital Medications:    Current Facility-Administered Medications:   •  vancomycin 2250 mg/500mL NS IVPB premix, 25 mg/kg, Intravenous, Once, Eric Cancino M.D.  •  senna-docusate (PERICOLACE or SENOKOT S) 8.6-50 MG per tablet 2 Tablet, 2 Tablet, Oral, BID **AND** polyethylene glycol/lytes (MIRALAX) PACKET 1 Packet, 1 Packet, Oral, QDAY PRN **AND** magnesium hydroxide (MILK OF MAGNESIA) suspension 30 mL, 30 mL, Oral, QDAY PRN **AND** bisacodyl (DULCOLAX) suppository 10 mg, 10 mg, Rectal, QDAY PRN, Markel Arceo M.D.  •  [START  ON 12/16/2021] heparin injection 5,000 Units, 5,000 Units, Subcutaneous, Q8HRS, Markel Arceo M.D.  •  acetaminophen (Tylenol) tablet 650 mg, 650 mg, Oral, Q6HRS PRN, Markel Arceo M.D.  •  ondansetron (ZOFRAN) syringe/vial injection 4 mg, 4 mg, Intravenous, Q4HRS PRN, Markel Arceo M.D.  •  ondansetron (ZOFRAN ODT) dispertab 4 mg, 4 mg, Oral, Q4HRS PRN, Markel Arceo M.D.  •  MD Alert...Vancomycin per Pharmacy, , Other, PHARMACY TO DOSE, Markel Arceo M.D.  •  piperacillin-tazobactam (ZOSYN) 3.375 g in  mL IVPB, 3.375 g, Intravenous, Once **AND** piperacillin-tazobactam (ZOSYN) 3.375 g in  mL IVPB, 3.375 g, Intravenous, Q8HRS, Markel Arceo M.D.  •  amLODIPine (NORVASC) tablet 10 mg, 10 mg, Oral, DAILY, Markel Arceo M.D.  •  baclofen (LIORESAL) tablet 20 mg, 20 mg, Oral, 4X/DAY, Markel Arceo M.D.  •  losartan (COZAAR) tablet 50 mg, 50 mg, Oral, DAILY, Markel Arceo M.D.  •  melatonin tablet 10 mg, 10 mg, Oral, QHS, Markel Arceo M.D.  •  labetalol (NORMODYNE/TRANDATE) injection 10 mg, 10 mg, Intravenous, Q6HRS PRN, Markel Arceo M.D.    Current Outpatient Medications:   •  Ascorbic Acid (VITAMIN C) 1000 MG Tab, Take 1,000 mg by mouth every day., Disp: , Rfl:   •  nitrofurantoin (MACROBID) 100 MG Cap, Take 100 mg by mouth every evening. Per MAR, pt started on 12/2/2021 for 30 day course, Disp: , Rfl:   •  levoFLOXacin (LEVAQUIN) 750 MG tablet, Take one tab po daily x 7 days (Patient taking differently: Take 750 mg by mouth every day. Take one tab po daily x 7 days, pt started on 11/23/2021), Disp: 7 Tablet, Rfl: 0  •  Zinc Sulfate 50 MG Cap, Take 50 mg by mouth every day., Disp: , Rfl:   •  Non Formulary Request, Take 2 Tablets by mouth 2 times a day. Mannose/Cranberry 900mg/500mg, Disp: , Rfl:   •  amLODIPine (NORVASC) 10 MG Tab, Take 10 mg by mouth every day., Disp: , Rfl:   •  melatonin 5 mg Tab, Take 10 mg by mouth at bedtime., Disp: , Rfl:   •  magnesium oxide (MAG-OX) 400 MG Tab tablet, Take 400  "mg by mouth every day., Disp: , Rfl:   •  polyethylene glycol/lytes (MIRALAX) 17 g Pack, Take 17 g by mouth every day., Disp: , Rfl:   •  Probiotic Product (PROBIOTIC PO), Take 1 Tab by mouth every day., Disp: , Rfl:   •  acetaminophen (TYLENOL) 500 MG Tab, Take 1,000 mg by mouth 2 times a day. Takes 1000 mg twice daily then 500 mg every 4 hours as needed (3grams per 24 hours) , Disp: , Rfl:   •  Cholecalciferol (VITAMIN D) 2000 UNIT Tab, Take 2,000 Units by mouth every day., Disp: , Rfl:   •  sennosides (SENOKOT) 8.6 MG Tab, Take 17.2 mg by mouth 2 times a day., Disp: , Rfl:   •  ferrous sulfate 325 (65 Fe) MG tablet, Take 325 mg by mouth 2 Times a Day., Disp: , Rfl:   •  docusate sodium (COLACE) 100 MG Cap, Take 200 mg by mouth 2 times a day., Disp: , Rfl:   •  baclofen (LIORESAL) 20 MG tablet, Take 20 mg by mouth 4 times a day., Disp: , Rfl:   •  losartan (COZAAR) 50 MG Tab, Take 50 mg by mouth every day., Disp: , Rfl:   •  therapeutic multivitamin-minerals (THERAGRAN-M) Tab, Take 1 Tab by mouth every day., Disp: , Rfl:     Current Outpatient Medications:  (Not in a hospital admission)      Medication Allergy:  Allergies   Allergen Reactions   • Sulfa Drugs Rash     Rash, developed this back in 2015 after being placed on \"sulfa antibiotic for my wound\". Antibiotic was stopped and rash went away. Patient states he had a sulfa antibiotic prior to that time back when he was younger w/o a reaction.          Family History:  Family History   Problem Relation Age of Onset   • Heart Disease Father        Social History:  Social History     Socioeconomic History   • Marital status:      Spouse name: Not on file   • Number of children: Not on file   • Years of education: Not on file   • Highest education level: Not on file   Occupational History   • Not on file   Tobacco Use   • Smoking status: Former Smoker     Packs/day: 1.00     Types: Cigarettes     Start date: 1/1/1967     Quit date: 1/1/1977     Years since " "quittin.9   • Smokeless tobacco: Never Used   Vaping Use   • Vaping Use: Never used   Substance and Sexual Activity   • Alcohol use: Yes     Alcohol/week: 0.6 oz     Types: 1 Standard drinks or equivalent per week     Comment: nightly   • Drug use: No   • Sexual activity: Not on file   Other Topics Concern   • Not on file   Social History Narrative   • Not on file     Social Determinants of Health     Financial Resource Strain:    • Difficulty of Paying Living Expenses: Not on file   Food Insecurity:    • Worried About Running Out of Food in the Last Year: Not on file   • Ran Out of Food in the Last Year: Not on file   Transportation Needs:    • Lack of Transportation (Medical): Not on file   • Lack of Transportation (Non-Medical): Not on file   Physical Activity:    • Days of Exercise per Week: Not on file   • Minutes of Exercise per Session: Not on file   Stress:    • Feeling of Stress : Not on file   Social Connections:    • Frequency of Communication with Friends and Family: Not on file   • Frequency of Social Gatherings with Friends and Family: Not on file   • Attends [employer] Services: Not on file   • Active Member of Clubs or Organizations: Not on file   • Attends Club or Organization Meetings: Not on file   • Marital Status: Not on file   Intimate Partner Violence:    • Fear of Current or Ex-Partner: Not on file   • Emotionally Abused: Not on file   • Physically Abused: Not on file   • Sexually Abused: Not on file   Housing Stability:    • Unable to Pay for Housing in the Last Year: Not on file   • Number of Places Lived in the Last Year: Not on file   • Unstable Housing in the Last Year: Not on file         Physical Exam:  Vitals/ General Appearance:   Weight/BMI: Body mass index is 27.98 kg/m².  BP (!) 186/72   Pulse (!) 50   Temp 36.7 °C (98.1 °F) (Temporal)   Resp 16   Ht 1.778 m (5' 10\")   Wt 88.5 kg (195 lb)   SpO2 95%   Vitals:    12/15/21 1127 12/15/21 1131   BP: (!) 186/72    Pulse: (!) " "50    Resp: 16    Temp: 36.7 °C (98.1 °F)    TempSrc: Temporal    SpO2: 95%    Weight:  88.5 kg (195 lb)   Height:  1.778 m (5' 10\")     Oxygen Therapy:  Pulse Oximetry: 95 %, O2 Delivery Device: None - Room Air    Constitutional:   Well developed, Well nourished, No acute distress  HENMT:  Normocephalic, Atraumatic, Oropharynx moist mucous membranes, No oral exudates, Nose normal.  No thyromegaly.  Eyes:  EOMI, Conjunctiva normal, No discharge.  Neck:  Normal range of motion, No cervical tenderness.  Cardiovascular:  Normal heart rate, Normal rhythm, No murmurs, No rubs, No gallops.   Extremitites with intact distal pulses, no cyanosis, or edema.  Lungs:  Normal breath sounds, breath sounds clear to auscultation bilaterally,  no crackles, no wheezing.   Abdomen: Bowel sounds normal, Soft, No tenderness, No guarding, No rebound, No masses, No hepatosplenomegaly.  : -CVAT  Skin: Warm, Dry, No erythema, No rash, no induration.  Neurologic: Alert & oriented x 3, No focal deficits noted.  Psychiatric: Affect normal, Judgment normal, Mood normal.      MDM (Data Review):     Records reviewed and summarized in current documentation    Lab Data Review:  Recent Results (from the past 24 hour(s))   COMP METABOLIC PANEL    Collection Time: 12/15/21 11:40 AM   Result Value Ref Range    Sodium 141 135 - 145 mmol/L    Potassium 4.6 3.6 - 5.5 mmol/L    Chloride 109 96 - 112 mmol/L    Co2 20 20 - 33 mmol/L    Anion Gap 12.0 7.0 - 16.0    Glucose 90 65 - 99 mg/dL    Bun 14 8 - 22 mg/dL    Creatinine 0.70 0.50 - 1.40 mg/dL    Calcium 8.9 8.4 - 10.2 mg/dL    AST(SGOT) 16 12 - 45 U/L    ALT(SGPT) 11 2 - 50 U/L    Alkaline Phosphatase 63 30 - 99 U/L    Total Bilirubin 0.6 0.1 - 1.5 mg/dL    Albumin 3.8 3.2 - 4.9 g/dL    Total Protein 7.0 6.0 - 8.2 g/dL    Globulin 3.2 1.9 - 3.5 g/dL    A-G Ratio 1.2 g/dL   LACTIC ACID    Collection Time: 12/15/21 11:40 AM   Result Value Ref Range    Lactic Acid 1.5 0.5 - 2.0 mmol/L   PT/INR    " Collection Time: 12/15/21 11:40 AM   Result Value Ref Range    PT 13.9 12.0 - 14.6 sec    INR 1.16 (H) 0.87 - 1.13   PTT    Collection Time: 12/15/21 11:40 AM   Result Value Ref Range    APTT 32.6 24.7 - 36.0 sec   ESTIMATED GFR    Collection Time: 12/15/21 11:40 AM   Result Value Ref Range    GFR If African American >60 >60 mL/min/1.73 m 2    GFR If Non African American >60 >60 mL/min/1.73 m 2   CBC WITH DIFFERENTIAL    Collection Time: 12/15/21 12:30 PM   Result Value Ref Range    WBC 6.2 4.8 - 10.8 K/uL    RBC 3.84 (L) 4.70 - 6.10 M/uL    Hemoglobin 13.0 (L) 14.0 - 18.0 g/dL    Hematocrit 40.3 (L) 42.0 - 52.0 %    .9 (H) 81.4 - 97.8 fL    MCH 33.9 (H) 27.0 - 33.0 pg    MCHC 32.3 (L) 33.7 - 35.3 g/dL    RDW 51.1 (H) 35.9 - 50.0 fL    Platelet Count 148 (L) 164 - 446 K/uL    MPV 8.8 (L) 9.0 - 12.9 fL    Neutrophils-Polys 58.50 44.00 - 72.00 %    Lymphocytes 26.60 22.00 - 41.00 %    Monocytes 10.90 0.00 - 13.40 %    Eosinophils 3.20 0.00 - 6.90 %    Basophils 0.30 0.00 - 1.80 %    Immature Granulocytes 0.50 0.00 - 0.90 %    Nucleated RBC 0.00 /100 WBC    Neutrophils (Absolute) 3.61 1.82 - 7.42 K/uL    Lymphs (Absolute) 1.64 1.00 - 4.80 K/uL    Monos (Absolute) 0.67 0.00 - 0.85 K/uL    Eos (Absolute) 0.20 0.00 - 0.51 K/uL    Baso (Absolute) 0.02 0.00 - 0.12 K/uL    Immature Granulocytes (abs) 0.03 0.00 - 0.11 K/uL    NRBC (Absolute) 0.00 K/uL   EKG    Collection Time: 12/15/21 12:31 PM   Result Value Ref Range    Report       [facility] Emergency Dept.    Test Date:  2021-12-15  Pt Name:    NICHOLAS CASEY               Department: E[facility]  MRN:        [id number]                      [facility]  Gender:     Male                         Technician: OLI  :        1950                   Requested By:CATHIE PULIDO  Order #:    [id number]                    Reading MD:    Measurements  Intervals                                Axis  Rate:       44                           P:           38  GA:         209                          QRS:        -24  QRSD:       169                          T:          25  QT:         504  QTc:        432    Interpretive Statements  Sinus bradycardia  Right bundle branch block  Baseline wander in lead(s) V2,V3  Compared to ECG 05/25/2021 12:54:06  No significant changes         Imaging/Procedures Review:    Reviewed    MDM (Assessment and Plan):       A 70 yo male with incomplete quadriplegia from prior spinal cord injury who has an SPT with recurrent UTI and incidental discovery of a 9 mm proximal left ureteral stone. There are no signs of sepsis and renal functions are normal. Plan at this point will be for Hospitalist admission for IV antibiotics. We will plan for left CULTS tomorrow with Dr. Bowen.Case discussed with Dr. Bowen who directed this plan of care.

## 2021-12-15 NOTE — PROGRESS NOTES
"Pharmacy Vancomycin Kinetics Note for 12/15/2021     71 y.o. male on Vancomycin day # 1     Vancomycin Indication (AUC Dosing): Complicated UTI    Provider specified end date: 12/22/21    Active Antibiotics (From admission, onward)    Ordered     Ordering Provider       Wed Dec 15, 2021 12:57 PM    12/15/21 1257  MD Alert...Vancomycin per Pharmacy  PHARMACY TO DOSE        Question:  Indication(s) for vancomycin?  Answer:  Urinary tract infection    Markel Arceo M.D.    12/15/21 1257  piperacillin-tazobactam (ZOSYN) 3.375 g in  mL IVPB  (piperacillin-tazobactam (ZOSYN) IV (Extended-infusion) PANEL )  ONCE        \"And\" Linked Group Details    Markel Arceo M.D.    12/15/21 1257  piperacillin-tazobactam (ZOSYN) 3.375 g in  mL IVPB  (piperacillin-tazobactam (ZOSYN) IV (Extended-infusion) PANEL )  EVERY 8 HOURS        \"And\" Linked Group Details    Markel Arceo M.D.       Wed Dec 15, 2021 12:01 PM    12/15/21 1201  vancomycin 2250 mg/500mL NS IVPB premix  (vancomycin (VANCOCIN) IV (LD + Maintenance))  ONCE         Eric Cancino M.D.          Dosing Weight: 88.5 kg (195 lb 1.7 oz)      Admission History: Admitted on 12/15/2021 for Obstructive uropathy [N13.9]  Pertinent history: 72 yo M with obstructive uropathy started on empiric vancomycin and zosyn. Historic urine cultures with MRSA and Pseudomonas aeruginosa. Cystoscopy scheduled for 12/16/21.    Allergies:     Sulfa drugs     Pertinent cultures to date:     Results     Procedure Component Value Units Date/Time    URINALYSIS [[id number]]  (Abnormal) Collected: 12/15/21 1304    Order Status: Completed Specimen: Urine Updated: 12/15/21 1323     Color Yellow     Character Clear     Specific Gravity 1.015     Ph 5.5     Glucose Negative mg/dL      Ketones Negative mg/dL      Protein Negative mg/dL      Bilirubin Negative     Nitrite Negative     Leukocyte Esterase Large     Occult Blood Moderate     Micro Urine Req Microscopic    Narrative:      Collected " "By:  Indication for culture:->Evaluation for sepsis without a  clear source of infection    URINE CULTURE(NEW) [[id number]] Collected: 12/15/21 1304    Order Status: Completed Specimen: Urine Updated: 12/15/21 1310    Narrative:      Collected By:  Indication for culture:->Evaluation for sepsis without a  clear source of infection          Labs:     Estimated Creatinine Clearance: 108.4 mL/min (by C-G formula based on SCr of 0.7 mg/dL).  Recent Labs     12/15/21  1230   WBC 6.2   NEUTSPOLYS 58.50     Recent Labs     12/15/21  1140   BUN 14   CREATININE 0.70   ALBUMIN 3.8     No intake or output data in the 24 hours ending 12/15/21 1353   /69   Pulse (!) 45   Temp 36.7 °C (98.1 °F) (Temporal)   Resp 16   Ht 1.778 m (5' 10\")   Wt 88.5 kg (195 lb)   SpO2 93%  Temp (24hrs), Av.7 °C (98.1 °F), Min:36.7 °C (98.1 °F), Max:36.7 °C (98.1 °F)      List concerns for Vancomycin clearance:     Age;BUN/Scr ratio greater than 20:1;Malnutrition/Low albumin;Receipt of contrast dye;Obesity    Pharmacokinetics:     AUC kinetics:   Ke (hr ^-1): 0.094 hr^-1  Half life: 7.37 hr  Clearance: 5.407  Estimated TDD: 2703.5  Estimated Dose: 1059  Estimated interval: 9.4        A/P:     -  Vancomycin dose: vancomycin 2250 mg IV loading dose on 12/15 followed by vancomycin 1000 mg IV every 12 hours     -  Next vancomycin level(s): defer    -  Predicted vancomycin AUC from initial AUC test calculator: 462 mg·hr/L    -  Comments:     Britni Croft, PharmD  "

## 2021-12-15 NOTE — ED PROVIDER NOTES
ED Provider Note    CHIEF COMPLAINT  Chief Complaint   Patient presents with   • Nephrolithiasis        HPI  Osvaldo Pate is a 71 y.o. male who presents from his group home.  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  Patient does have a suprapubic catheter that was placed by urology in the past.  Patient did have a CT scan done ordered by urology yesterday that showed a 9 mm obstructing kidney stone and because of this it was concerned that the patient may become septic so he was transferred to the emergency department for evaluation and admission to the hospital.  This information is obtained from both the patient as well as Toyin the physician assistant working with the urology group.  Patient currently has complicating history of a sacral decub that is currently undergoing therapy as well and is a C7 paraplegic.  Really the patient has no other complaints states that he actually feels well.    REVIEW OF SYSTEMS  See HPI for further details. All other systems are negative.     PAST MEDICAL HISTORY  Past Medical History:   Diagnosis Date   • A-fib (HCC)    • Atrial flutter (HCC)    • Blood clotting disorder (HCC)     Patient is on Xarelto   • Bowel habit changes     Colostomy   • GERD (gastroesophageal reflux disease)    • Hypertension    • Neurogenic bladder    • Quadriplegia, C5-C7 complete (HCC)        FAMILY HISTORY  Family History   Problem Relation Age of Onset   • Heart Disease Father        SOCIAL HISTORY  Social History     Socioeconomic History   • Marital status:      Spouse name: Not on file   • Number of children: Not on file   • Years of education: Not on file   • Highest education level: Not on file   Occupational History   • Not on file   Tobacco Use   • Smoking status: Former Smoker     Packs/day: 1.00     Types: Cigarettes     Start date: 1/1/1967     Quit date: 1/1/1977      Years since quittin.9   • Smokeless tobacco: Never Used   Vaping Use   • Vaping Use: Never used   Substance and Sexual Activity   • Alcohol use: Yes     Alcohol/week: 0.6 oz     Types: 1 Standard drinks or equivalent per week     Comment: nightly   • Drug use: No   • Sexual activity: Not on file   Other Topics Concern   • Not on file   Social History Narrative   • Not on file     Social Determinants of Health     Financial Resource Strain:    • Difficulty of Paying Living Expenses: Not on file   Food Insecurity:    • Worried About Running Out of Food in the Last Year: Not on file   • Ran Out of Food in the Last Year: Not on file   Transportation Needs:    • Lack of Transportation (Medical): Not on file   • Lack of Transportation (Non-Medical): Not on file   Physical Activity:    • Days of Exercise per Week: Not on file   • Minutes of Exercise per Session: Not on file   Stress:    • Feeling of Stress : Not on file   Social Connections:    • Frequency of Communication with Friends and Family: Not on file   • Frequency of Social Gatherings with Friends and Family: Not on file   • Attends Zoroastrianism Services: Not on file   • Active Member of Clubs or Organizations: Not on file   • Attends Club or Organization Meetings: Not on file   • Marital Status: Not on file   Intimate Partner Violence:    • Fear of Current or Ex-Partner: Not on file   • Emotionally Abused: Not on file   • Physically Abused: Not on file   • Sexually Abused: Not on file   Housing Stability:    • Unable to Pay for Housing in the Last Year: Not on file   • Number of Places Lived in the Last Year: Not on file   • Unstable Housing in the Last Year: Not on file      ANITA PrettyPDenizRALEXI.      SURGICAL HISTORY  Past Surgical History:   Procedure Laterality Date   • IRRIGATION & DEBRIDEMENT GENERAL  2020    Procedure: IRRIGATION AND DEBRIDEMENT, WOUND SACRAL ULCER;  Surgeon: Matt Cummins M.D.;  Location: SURGERY [facility];  Service:  Plastics   • ULCER DEBRIDEMENT N/A 8/21/2019    Procedure: debridement of Sacral grade 4 ulcer - W/BONE BIOPSY, 3 liter wash out. bilateral sliding gluteal myocutaneous flap advancement;  Surgeon: Amadeo Moon M.D.;  Location: SURGERY [facility];  Service: Plastics   • FLAP CLOSURE  8/21/2019    Procedure: CLOSURE, FLAP - MUSCLE;  Surgeon: Amadeo Moon M.D.;  Location: SURGERY [facility];  Service: Plastics   • COLOSTOMY N/A 7/27/2019    Procedure: CREATION, COLOSTOMY -  placement;  Surgeon: Elías Hannah M.D.;  Location: SURGERY [facility];  Service: General   • COLOSTOMY     • COLOSTOMY TAKEDOWN     • HERNIA REPAIR     • PERCUTANEOUOSPINNING LOWER EXTREMITY         CURRENT MEDICATIONS  Home Medications     Reviewed by Humberto Chester (Pharmacy Tech) on 12/15/21 at 1234  Med List Status: Complete   Medication Last Dose Status   acetaminophen (TYLENOL) 500 MG Tab 12/15/2021 Active   amLODIPine (NORVASC) 10 MG Tab 12/15/2021 Active   Ascorbic Acid (VITAMIN C) 1000 MG Tab 12/15/2021 Active   baclofen (LIORESAL) 20 MG tablet 12/15/2021 Active   Cholecalciferol (VITAMIN D) 2000 UNIT Tab 12/15/2021 Active   docusate sodium (COLACE) 100 MG Cap 12/15/2021 Active   ferrous sulfate 325 (65 Fe) MG tablet 12/15/2021 Active   levoFLOXacin (LEVAQUIN) 750 MG tablet 11/29/2021 Active   losartan (COZAAR) 50 MG Tab 12/14/2021 Active   magnesium oxide (MAG-OX) 400 MG Tab tablet 12/15/2021 Active   melatonin 5 mg Tab 12/14/2021 Active   nitrofurantoin (MACROBID) 100 MG Cap 12/14/2021 Active   Non Formulary Request 12/9/2021 Active   polyethylene glycol/lytes (MIRALAX) 17 g Pack 12/15/2021 Active   Probiotic Product (PROBIOTIC PO) 12/15/2021 Active   sennosides (SENOKOT) 8.6 MG Tab 12/15/2021 Active   therapeutic multivitamin-minerals (THERAGRAN-M) Tab 12/9/2021 Active   Zinc Sulfate 50 MG Cap 12/15/2021 Active              No current facility-administered medications on file prior to encounter.  "    Current Outpatient Medications on File Prior to Encounter   Medication Sig Dispense Refill   • Ascorbic Acid (VITAMIN C) 1000 MG Tab Take 1,000 mg by mouth every day.     • nitrofurantoin (MACROBID) 100 MG Cap Take 100 mg by mouth every evening. Per MAR, pt started on 12/2/2021 for 30 day course     • levoFLOXacin (LEVAQUIN) 750 MG tablet Take one tab po daily x 7 days (Patient taking differently: Take 750 mg by mouth every day. Take one tab po daily x 7 days, pt started on 11/23/2021) 7 Tablet 0   • Zinc Sulfate 50 MG Cap Take 50 mg by mouth every day.     • Non Formulary Request Take 2 Tablets by mouth 2 times a day. Mannose/Cranberry 900mg/500mg     • amLODIPine (NORVASC) 10 MG Tab Take 10 mg by mouth every day.     • melatonin 5 mg Tab Take 10 mg by mouth at bedtime.     • magnesium oxide (MAG-OX) 400 MG Tab tablet Take 400 mg by mouth every day.     • polyethylene glycol/lytes (MIRALAX) 17 g Pack Take 17 g by mouth every day.     • Probiotic Product (PROBIOTIC PO) Take 1 Tab by mouth every day.     • acetaminophen (TYLENOL) 500 MG Tab Take 1,000 mg by mouth 2 times a day. Takes 1000 mg twice daily then 500 mg every 4 hours as needed (3grams per 24 hours)      • Cholecalciferol (VITAMIN D) 2000 UNIT Tab Take 2,000 Units by mouth every day.     • sennosides (SENOKOT) 8.6 MG Tab Take 17.2 mg by mouth 2 times a day.     • ferrous sulfate 325 (65 Fe) MG tablet Take 325 mg by mouth 2 Times a Day.     • docusate sodium (COLACE) 100 MG Cap Take 200 mg by mouth 2 times a day.     • baclofen (LIORESAL) 20 MG tablet Take 20 mg by mouth 4 times a day.     • losartan (COZAAR) 50 MG Tab Take 50 mg by mouth every day.     • therapeutic multivitamin-minerals (THERAGRAN-M) Tab Take 1 Tab by mouth every day.           ALLERGIES  Allergies   Allergen Reactions   • Sulfa Drugs Rash     Rash, developed this back in 2015 after being placed on \"sulfa antibiotic for my wound\". Antibiotic was stopped and rash went away. Patient " "states he had a sulfa antibiotic prior to that time back when he was younger w/o a reaction.          PHYSICAL EXAM  VITAL SIGNS: /69   Pulse (!) 45   Temp 36.7 °C (98.1 °F) (Temporal)   Resp 16   Ht 1.778 m (5' 10\")   Wt 88.5 kg (195 lb)   SpO2 93%   BMI 27.98 kg/m²    Pulse Oximetry was obtained. It showed a reading of Pulse Oximetry: 95 %.  I interpreted this as nonhypoxic.     Constitutional: Well developed, Well nourished, No acute distress, Non-toxic appearance.   HENT: Normocephalic, Atraumatic, Bilateral external ears normal, bilateral tympanic membranes normal, Oropharynx mucous membranes, No oral exudates, Nose normal.   Eyes:  conjunctiva is normal, there are no signs of exudate.   Neck: Supple, no cervical lymphadenopathy, no meningeal signs..   Lymphatic: No lymphadenopathy noted.   Cardiovascular: Regular rate and rhythm without murmurs gallops or rubs.   Thorax & Lungs: Lungs are clear to auscultation bilaterally, there are no wheezes no rales. Chest wall is nontender.  Abdomen: Soft, nontender nondistended. Bowel sounds are present.   Skin: Has a bandage over his sacral decub no signs of discharge  Back: No tenderness, No CVA tenderness.  Musculoskeletal: Patient has cushions over his heels.  No edema in the lower extremities    Neurologic: She is able to move his arms his legs however he is a paraplegic.  This is chronic in nature.  No change  Psychiatric: Affect normal, Judgment normal, Mood normal.     EKG  Interpreted below by myself      RADIOLOGY/PROCEDURES  DX-CHEST-PORTABLE (1 VIEW)   Final Result      No radiographic evidence of acute cardiopulmonary process.          Results for orders placed or performed during the hospital encounter of 12/15/21   COMP METABOLIC PANEL   Result Value Ref Range    Sodium 141 135 - 145 mmol/L    Potassium 4.6 3.6 - 5.5 mmol/L    Chloride 109 96 - 112 mmol/L    Co2 20 20 - 33 mmol/L    Anion Gap 12.0 7.0 - 16.0    Glucose 90 65 - 99 mg/dL    Bun " 14 8 - 22 mg/dL    Creatinine 0.70 0.50 - 1.40 mg/dL    Calcium 8.9 8.4 - 10.2 mg/dL    AST(SGOT) 16 12 - 45 U/L    ALT(SGPT) 11 2 - 50 U/L    Alkaline Phosphatase 63 30 - 99 U/L    Total Bilirubin 0.6 0.1 - 1.5 mg/dL    Albumin 3.8 3.2 - 4.9 g/dL    Total Protein 7.0 6.0 - 8.2 g/dL    Globulin 3.2 1.9 - 3.5 g/dL    A-G Ratio 1.2 g/dL   URINALYSIS    Specimen: Urine   Result Value Ref Range    Color Yellow     Character Clear     Specific Gravity 1.015 <1.035    Ph 5.5 5.0 - 8.0    Glucose Negative Negative mg/dL    Ketones Negative Negative mg/dL    Protein Negative Negative mg/dL    Bilirubin Negative Negative    Nitrite Negative Negative    Leukocyte Esterase Large (A) Negative    Occult Blood Moderate (A) Negative    Micro Urine Req Microscopic    LACTIC ACID   Result Value Ref Range    Lactic Acid 1.5 0.5 - 2.0 mmol/L   PT/INR   Result Value Ref Range    PT 13.9 12.0 - 14.6 sec    INR 1.16 (H) 0.87 - 1.13   PTT   Result Value Ref Range    APTT 32.6 24.7 - 36.0 sec   CBC WITH DIFFERENTIAL   Result Value Ref Range    WBC 6.2 4.8 - 10.8 K/uL    RBC 3.84 (L) 4.70 - 6.10 M/uL    Hemoglobin 13.0 (L) 14.0 - 18.0 g/dL    Hematocrit 40.3 (L) 42.0 - 52.0 %    .9 (H) 81.4 - 97.8 fL    MCH 33.9 (H) 27.0 - 33.0 pg    MCHC 32.3 (L) 33.7 - 35.3 g/dL    RDW 51.1 (H) 35.9 - 50.0 fL    Platelet Count 148 (L) 164 - 446 K/uL    MPV 8.8 (L) 9.0 - 12.9 fL    Neutrophils-Polys 58.50 44.00 - 72.00 %    Lymphocytes 26.60 22.00 - 41.00 %    Monocytes 10.90 0.00 - 13.40 %    Eosinophils 3.20 0.00 - 6.90 %    Basophils 0.30 0.00 - 1.80 %    Immature Granulocytes 0.50 0.00 - 0.90 %    Nucleated RBC 0.00 /100 WBC    Neutrophils (Absolute) 3.61 1.82 - 7.42 K/uL    Lymphs (Absolute) 1.64 1.00 - 4.80 K/uL    Monos (Absolute) 0.67 0.00 - 0.85 K/uL    Eos (Absolute) 0.20 0.00 - 0.51 K/uL    Baso (Absolute) 0.02 0.00 - 0.12 K/uL    Immature Granulocytes (abs) 0.03 0.00 - 0.11 K/uL    NRBC (Absolute) 0.00 K/uL   ESTIMATED GFR   Result Value  Ref Range    GFR If African American >60 >60 mL/min/1.73 m 2    GFR If Non African American >60 >60 mL/min/1.73 m 2   Magnesium   Result Value Ref Range    Magnesium 2.2 1.5 - 2.5 mg/dL   URINE MICROSCOPIC (W/UA)   Result Value Ref Range    WBC  (A) /hpf    RBC 10-20 (A) /hpf    Bacteria Moderate (A) None /hpf    Epithelial Cells Rare Few /hpf    Hyaline Cast 0-2 /lpf   EKG   Result Value Ref Range    Report       [facility] Emergency Dept.    Test Date:  2021-12-15  Pt Name:    NICHOLAS CASEY               Department: EDS  MRN:        [id number]                      Room:       -ROOM 4  Gender:     Male                         Technician: OLI  :        [id number]                   Requested By:CATHIE PULIDO  Order #:    [id number]                    Reading MD: CATHIE PULIDO MD    Measurements  Intervals                                Axis  Rate:       44                           P:          38  AZ:         209                          QRS:        -24  QRSD:       169                          T:          25  QT:         504  QTc:        432    Interpretive Statements  Sinus bradycardia  Right bundle branch block  Baseline wander in lead(s) V2,V3  Compared to ECG 2021 12:54:06  No significant changes  Electronically Signed On 12- 15:32:12 PST by CATHIE PULIDO MD           COURSE & MEDICAL DECISION MAKING  Pertinent Labs & Imaging studies reviewed. (See chart for details)  Patient presents emerge department for evaluation.  Clinically patient appears well.  I did speak with the urology group and at this point the recommendation is to start IV antibiotics admit the patient make him n.p.o. after midnight so that they can do either a stent or procedure tomorrow morning.  I have spoken to the hospitalist for this admission.    FINAL IMPRESSION  1. Ureterolithiasis     2. Urinary tract infection associated with catheterization of urinary tract, unspecified  indwelling urinary catheter type, initial encounter (HCC)                     Electronically signed by: Eric Cancino M.D., 12/15/2021 11:55 AM

## 2021-12-15 NOTE — ASSESSMENT & PLAN NOTE
The patient presents as a admitted from his urologist office.  CT scan was done at that time and showed 9 mm obstructive stone in the left renal system.  Their recommendations were for stone removal tomorrow morning.  Patient has a history of frequent urinary tract infections with previous cultures growing Pseudomonas and MRSA.  At this time, patient denies any fevers or chills.  Unable to identify any suprapubic pain because of history of sensory deficit from C7 injury.  He has not noticed any change in the quality or quantity of his urine into the Cazares bag.  Due to his complicated medical history, urology with recommendations for antibiotics based on previous cultures and stone removal in the morning.  Patient without acute renal failure  -Urology consulted, taken to OR, unable to complete lithotripsy but stent placed and ureter decompressed.  Further attempt at lithotripsy in 2 weeks  --Urine culture shows pseudomonas, will continue current antibiotics until cutlure at 48 hours.    History of MRSA and pseudomonas on culture 12/9, CT was done day of admission to see if source of recurrent infection could be found, patient given instruction to present to the ED for antibiotics, admission and surgical intervention with urology.

## 2021-12-15 NOTE — ED NOTES
Med rec updated and complete  Allergies reviewed  Received MAR from [facility] ([phone])      Current Outpatient Medications on File Prior to Encounter   Medication Sig Dispense Refill   • Ascorbic Acid (VITAMIN C) 1000 MG Tab Take 1,000 mg by mouth every day.     • nitrofurantoin (MACROBID) 100 MG Cap Take 100 mg by mouth every evening. Per MAR, pt started on 12/2/2021 for 30 day course     • levoFLOXacin (LEVAQUIN) 750 MG tablet Take one tab po daily x 7 days (Patient taking differently: Take 750 mg by mouth every day. Take one tab po daily x 7 days, pt started on 11/23/2021) 7 Tablet 0   • Zinc Sulfate 50 MG Cap Take 50 mg by mouth every day.     • Non Formulary Request Take 2 Tablets by mouth 2 times a day. Mannose/Cranberry 900mg/500mg     • amLODIPine (NORVASC) 10 MG Tab Take 10 mg by mouth every day.     • melatonin 5 mg Tab Take 10 mg by mouth at bedtime.     • magnesium oxide (MAG-OX) 400 MG Tab tablet Take 400 mg by mouth every day.     • polyethylene glycol/lytes (MIRALAX) 17 g Pack Take 17 g by mouth every day.     • Probiotic Product (PROBIOTIC PO) Take 1 Tab by mouth every day.     • acetaminophen (TYLENOL) 500 MG Tab Take 1,000 mg by mouth 2 times a day. Takes 1000 mg twice daily then 500 mg every 4 hours as needed (3grams per 24 hours)      • Cholecalciferol (VITAMIN D) 2000 UNIT Tab Take 2,000 Units by mouth every day.     • sennosides (SENOKOT) 8.6 MG Tab Take 17.2 mg by mouth 2 times a day.     • ferrous sulfate 325 (65 Fe) MG tablet Take 325 mg by mouth 2 Times a Day.     • docusate sodium (COLACE) 100 MG Cap Take 200 mg by mouth 2 times a day.     • baclofen (LIORESAL) 20 MG tablet Take 20 mg by mouth 4 times a day.     • losartan (COZAAR) 50 MG Tab Take 50 mg by mouth every day.     • therapeutic multivitamin-minerals (THERAGRAN-M) Tab Take 1 Tab by mouth every day.
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
## 2021-12-15 NOTE — ASSESSMENT & PLAN NOTE
Wound care has been consulted  PT/OT   Reviewed photos with wound care PT, still with black wound vac foam in the wound >at least 6 months.  Patient to follow with Dr Banks as outpatient, was supposed to see today.  Not appropriate for intervention on this hospitalization given drug resistant UTI present.

## 2021-12-15 NOTE — H&P (VIEW-ONLY)
UROLOGY Consult Note:    Navneet Campuzano P.A.-C.  Date & Time note created:    12/15/2021   1:04 PM     Referring MD:  Dr. Cancino    Patient ID:   Name:             Osvaldo Pate   YOB: 1950  Age:                 71 y.o.  male   MRN:               [id number]                                                             Reason for Consult:      Ureteral stone with UTI    History of Present Illness:    Mr. 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 He was in the office today in fact when it was discovered incidentally on surveillance imaging that he had an obstructing left ureteral stone. He has had recurrent UTIs as well with recent urine culture indicating 2 organisms. He was sent to the ER from the office for management and urologic surgical intervention. He has felt his usual self with perhaps only a change in early satiety. He has not had fevers, sweats or chills. He does not feel pain in the distribution where stone pain would present.     Review of Systems:      Constitutional: Denies fevers   Eyes: Denies changes in vision   Ears/Nose/Throat/Mouth: Denies nasal congestion   Cardiovascular: no chest pain   Respiratory: no shortness of breath   Gastrointestinal/Hepatic: Denies abdominal pain   Genitourinary: Denies hematuria, dysuria or frequency  Musculoskeletal/Rheum: Denies joint pain   Skin: Denies rash  Neurological: Denies headache   Psychiatric: denies mood disorder   Endocrine: Latrice thyroid problems  Heme/Oncology/Lymph Nodes: Denies enlarged lymph nodes   All other systems were reviewed and are negative (AMA/CMS criteria)                Past Medical History:   Past Medical History:   Diagnosis Date   • A-fib (HCC)    • Atrial flutter (HCC)    • Blood clotting disorder (HCC)     Patient is on Xarelto   • Bowel habit changes     Colostomy   • GERD (gastroesophageal reflux disease)    •  Hypertension    • Neurogenic bladder    • Quadriplegia, C5-C7 complete ([facility])      Active Hospital Problems    Diagnosis    • Obstructive uropathy [N13.9]    • Frequent UTI [N39.0]    • Bradycardia [R00.1]    • Pressure injury of sacral region, stage 4 ([facility]) [L89.154]    • Paroxysmal atrial flutter ([facility]) [I48.92]    • Essential hypertension [I10]    • Chronic incomplete quadriplegia ([facility]) [G82.50]    • Neurogenic bladder [N31.9]        Past Surgical History:  Past Surgical History:   Procedure Laterality Date   • IRRIGATION & DEBRIDEMENT GENERAL  12/20/2020    Procedure: IRRIGATION AND DEBRIDEMENT, WOUND SACRAL ULCER;  Surgeon: Matt Cummins M.D.;  Location: [facility];  Service: Plastics   • ULCER DEBRIDEMENT N/A 8/21/2019    Procedure: debridement of Sacral grade 4 ulcer - W/BONE BIOPSY, 3 liter wash out. bilateral sliding gluteal myocutaneous flap advancement;  Surgeon: Amadeo Moon M.D.;  Location: [facility];  Service: Plastics   • FLAP CLOSURE  8/21/2019    Procedure: CLOSURE, FLAP - MUSCLE;  Surgeon: Amadeo Moon M.D.;  Location: [facility];  Service: Plastics   • COLOSTOMY N/A 7/27/2019    Procedure: CREATION, COLOSTOMY -  placement;  Surgeon: Elías Hannah M.D.;  Location: [facility];  Service: General   • COLOSTOMY     • COLOSTOMY TAKEDOWN     • HERNIA REPAIR     • PERCUTANEOUOSPINNING LOWER EXTREMITY         Hospital Medications:    Current Facility-Administered Medications:   •  vancomycin 2250 mg/500mL NS IVPB premix, 25 mg/kg, Intravenous, Once, Eric Cancino M.D.  •  senna-docusate (PERICOLACE or SENOKOT S) 8.6-50 MG per tablet 2 Tablet, 2 Tablet, Oral, BID **AND** polyethylene glycol/lytes (MIRALAX) PACKET 1 Packet, 1 Packet, Oral, QDAY PRN **AND** magnesium hydroxide (MILK OF MAGNESIA) suspension 30 mL, 30 mL, Oral, QDAY PRN **AND** bisacodyl (DULCOLAX) suppository 10 mg, 10 mg, Rectal, QDAY PRN, Markel Arceo M.D.  •  [START  ON 12/16/2021] heparin injection 5,000 Units, 5,000 Units, Subcutaneous, Q8HRS, Markel Arceo M.D.  •  acetaminophen (Tylenol) tablet 650 mg, 650 mg, Oral, Q6HRS PRN, Markel Arceo M.D.  •  ondansetron (ZOFRAN) syringe/vial injection 4 mg, 4 mg, Intravenous, Q4HRS PRN, Markel Arceo M.D.  •  ondansetron (ZOFRAN ODT) dispertab 4 mg, 4 mg, Oral, Q4HRS PRN, Markel Arceo M.D.  •  MD Alert...Vancomycin per Pharmacy, , Other, PHARMACY TO DOSE, Markel Arceo M.D.  •  piperacillin-tazobactam (ZOSYN) 3.375 g in  mL IVPB, 3.375 g, Intravenous, Once **AND** piperacillin-tazobactam (ZOSYN) 3.375 g in  mL IVPB, 3.375 g, Intravenous, Q8HRS, Markel Arceo M.D.  •  amLODIPine (NORVASC) tablet 10 mg, 10 mg, Oral, DAILY, Markel Arceo M.D.  •  baclofen (LIORESAL) tablet 20 mg, 20 mg, Oral, 4X/DAY, Markel Arceo M.D.  •  losartan (COZAAR) tablet 50 mg, 50 mg, Oral, DAILY, Markel Arceo M.D.  •  melatonin tablet 10 mg, 10 mg, Oral, QHS, Markel Arceo M.D.  •  labetalol (NORMODYNE/TRANDATE) injection 10 mg, 10 mg, Intravenous, Q6HRS PRN, Markel Arceo M.D.    Current Outpatient Medications:   •  Ascorbic Acid (VITAMIN C) 1000 MG Tab, Take 1,000 mg by mouth every day., Disp: , Rfl:   •  nitrofurantoin (MACROBID) 100 MG Cap, Take 100 mg by mouth every evening. Per MAR, pt started on 12/2/2021 for 30 day course, Disp: , Rfl:   •  levoFLOXacin (LEVAQUIN) 750 MG tablet, Take one tab po daily x 7 days (Patient taking differently: Take 750 mg by mouth every day. Take one tab po daily x 7 days, pt started on 11/23/2021), Disp: 7 Tablet, Rfl: 0  •  Zinc Sulfate 50 MG Cap, Take 50 mg by mouth every day., Disp: , Rfl:   •  Non Formulary Request, Take 2 Tablets by mouth 2 times a day. Mannose/Cranberry 900mg/500mg, Disp: , Rfl:   •  amLODIPine (NORVASC) 10 MG Tab, Take 10 mg by mouth every day., Disp: , Rfl:   •  melatonin 5 mg Tab, Take 10 mg by mouth at bedtime., Disp: , Rfl:   •  magnesium oxide (MAG-OX) 400 MG Tab tablet, Take 400  "mg by mouth every day., Disp: , Rfl:   •  polyethylene glycol/lytes (MIRALAX) 17 g Pack, Take 17 g by mouth every day., Disp: , Rfl:   •  Probiotic Product (PROBIOTIC PO), Take 1 Tab by mouth every day., Disp: , Rfl:   •  acetaminophen (TYLENOL) 500 MG Tab, Take 1,000 mg by mouth 2 times a day. Takes 1000 mg twice daily then 500 mg every 4 hours as needed (3grams per 24 hours) , Disp: , Rfl:   •  Cholecalciferol (VITAMIN D) 2000 UNIT Tab, Take 2,000 Units by mouth every day., Disp: , Rfl:   •  sennosides (SENOKOT) 8.6 MG Tab, Take 17.2 mg by mouth 2 times a day., Disp: , Rfl:   •  ferrous sulfate 325 (65 Fe) MG tablet, Take 325 mg by mouth 2 Times a Day., Disp: , Rfl:   •  docusate sodium (COLACE) 100 MG Cap, Take 200 mg by mouth 2 times a day., Disp: , Rfl:   •  baclofen (LIORESAL) 20 MG tablet, Take 20 mg by mouth 4 times a day., Disp: , Rfl:   •  losartan (COZAAR) 50 MG Tab, Take 50 mg by mouth every day., Disp: , Rfl:   •  therapeutic multivitamin-minerals (THERAGRAN-M) Tab, Take 1 Tab by mouth every day., Disp: , Rfl:     Current Outpatient Medications:  (Not in a hospital admission)      Medication Allergy:  Allergies   Allergen Reactions   • Sulfa Drugs Rash     Rash, developed this back in 2015 after being placed on \"sulfa antibiotic for my wound\". Antibiotic was stopped and rash went away. Patient states he had a sulfa antibiotic prior to that time back when he was younger w/o a reaction.          Family History:  Family History   Problem Relation Age of Onset   • Heart Disease Father        Social History:  Social History     Socioeconomic History   • Marital status:      Spouse name: Not on file   • Number of children: Not on file   • Years of education: Not on file   • Highest education level: Not on file   Occupational History   • Not on file   Tobacco Use   • Smoking status: Former Smoker     Packs/day: 1.00     Types: Cigarettes     Start date: 1/1/1967     Quit date: 1/1/1977     Years since " "quittin.9   • Smokeless tobacco: Never Used   Vaping Use   • Vaping Use: Never used   Substance and Sexual Activity   • Alcohol use: Yes     Alcohol/week: 0.6 oz     Types: 1 Standard drinks or equivalent per week     Comment: nightly   • Drug use: No   • Sexual activity: Not on file   Other Topics Concern   • Not on file   Social History Narrative   • Not on file     Social Determinants of Health     Financial Resource Strain:    • Difficulty of Paying Living Expenses: Not on file   Food Insecurity:    • Worried About Running Out of Food in the Last Year: Not on file   • Ran Out of Food in the Last Year: Not on file   Transportation Needs:    • Lack of Transportation (Medical): Not on file   • Lack of Transportation (Non-Medical): Not on file   Physical Activity:    • Days of Exercise per Week: Not on file   • Minutes of Exercise per Session: Not on file   Stress:    • Feeling of Stress : Not on file   Social Connections:    • Frequency of Communication with Friends and Family: Not on file   • Frequency of Social Gatherings with Friends and Family: Not on file   • Attends Taoist Services: Not on file   • Active Member of Clubs or Organizations: Not on file   • Attends Club or Organization Meetings: Not on file   • Marital Status: Not on file   Intimate Partner Violence:    • Fear of Current or Ex-Partner: Not on file   • Emotionally Abused: Not on file   • Physically Abused: Not on file   • Sexually Abused: Not on file   Housing Stability:    • Unable to Pay for Housing in the Last Year: Not on file   • Number of Places Lived in the Last Year: Not on file   • Unstable Housing in the Last Year: Not on file         Physical Exam:  Vitals/ General Appearance:   Weight/BMI: Body mass index is 27.98 kg/m².  BP (!) 186/72   Pulse (!) 50   Temp 36.7 °C (98.1 °F) (Temporal)   Resp 16   Ht 1.778 m (5' 10\")   Wt 88.5 kg (195 lb)   SpO2 95%   Vitals:    12/15/21 1127 12/15/21 1131   BP: (!) 186/72    Pulse: (!) " "50    Resp: 16    Temp: 36.7 °C (98.1 °F)    TempSrc: Temporal    SpO2: 95%    Weight:  88.5 kg (195 lb)   Height:  1.778 m (5' 10\")     Oxygen Therapy:  Pulse Oximetry: 95 %, O2 Delivery Device: None - Room Air    Constitutional:   Well developed, Well nourished, No acute distress  HENMT:  Normocephalic, Atraumatic, Oropharynx moist mucous membranes, No oral exudates, Nose normal.  No thyromegaly.  Eyes:  EOMI, Conjunctiva normal, No discharge.  Neck:  Normal range of motion, No cervical tenderness.  Cardiovascular:  Normal heart rate, Normal rhythm, No murmurs, No rubs, No gallops.   Extremitites with intact distal pulses, no cyanosis, or edema.  Lungs:  Normal breath sounds, breath sounds clear to auscultation bilaterally,  no crackles, no wheezing.   Abdomen: Bowel sounds normal, Soft, No tenderness, No guarding, No rebound, No masses, No hepatosplenomegaly.  : -CVAT  Skin: Warm, Dry, No erythema, No rash, no induration.  Neurologic: Alert & oriented x 3, No focal deficits noted.  Psychiatric: Affect normal, Judgment normal, Mood normal.      MDM (Data Review):     Records reviewed and summarized in current documentation    Lab Data Review:  Recent Results (from the past 24 hour(s))   COMP METABOLIC PANEL    Collection Time: 12/15/21 11:40 AM   Result Value Ref Range    Sodium 141 135 - 145 mmol/L    Potassium 4.6 3.6 - 5.5 mmol/L    Chloride 109 96 - 112 mmol/L    Co2 20 20 - 33 mmol/L    Anion Gap 12.0 7.0 - 16.0    Glucose 90 65 - 99 mg/dL    Bun 14 8 - 22 mg/dL    Creatinine 0.70 0.50 - 1.40 mg/dL    Calcium 8.9 8.4 - 10.2 mg/dL    AST(SGOT) 16 12 - 45 U/L    ALT(SGPT) 11 2 - 50 U/L    Alkaline Phosphatase 63 30 - 99 U/L    Total Bilirubin 0.6 0.1 - 1.5 mg/dL    Albumin 3.8 3.2 - 4.9 g/dL    Total Protein 7.0 6.0 - 8.2 g/dL    Globulin 3.2 1.9 - 3.5 g/dL    A-G Ratio 1.2 g/dL   LACTIC ACID    Collection Time: 12/15/21 11:40 AM   Result Value Ref Range    Lactic Acid 1.5 0.5 - 2.0 mmol/L   PT/INR    " Collection Time: 12/15/21 11:40 AM   Result Value Ref Range    PT 13.9 12.0 - 14.6 sec    INR 1.16 (H) 0.87 - 1.13   PTT    Collection Time: 12/15/21 11:40 AM   Result Value Ref Range    APTT 32.6 24.7 - 36.0 sec   ESTIMATED GFR    Collection Time: 12/15/21 11:40 AM   Result Value Ref Range    GFR If African American >60 >60 mL/min/1.73 m 2    GFR If Non African American >60 >60 mL/min/1.73 m 2   CBC WITH DIFFERENTIAL    Collection Time: 12/15/21 12:30 PM   Result Value Ref Range    WBC 6.2 4.8 - 10.8 K/uL    RBC 3.84 (L) 4.70 - 6.10 M/uL    Hemoglobin 13.0 (L) 14.0 - 18.0 g/dL    Hematocrit 40.3 (L) 42.0 - 52.0 %    .9 (H) 81.4 - 97.8 fL    MCH 33.9 (H) 27.0 - 33.0 pg    MCHC 32.3 (L) 33.7 - 35.3 g/dL    RDW 51.1 (H) 35.9 - 50.0 fL    Platelet Count 148 (L) 164 - 446 K/uL    MPV 8.8 (L) 9.0 - 12.9 fL    Neutrophils-Polys 58.50 44.00 - 72.00 %    Lymphocytes 26.60 22.00 - 41.00 %    Monocytes 10.90 0.00 - 13.40 %    Eosinophils 3.20 0.00 - 6.90 %    Basophils 0.30 0.00 - 1.80 %    Immature Granulocytes 0.50 0.00 - 0.90 %    Nucleated RBC 0.00 /100 WBC    Neutrophils (Absolute) 3.61 1.82 - 7.42 K/uL    Lymphs (Absolute) 1.64 1.00 - 4.80 K/uL    Monos (Absolute) 0.67 0.00 - 0.85 K/uL    Eos (Absolute) 0.20 0.00 - 0.51 K/uL    Baso (Absolute) 0.02 0.00 - 0.12 K/uL    Immature Granulocytes (abs) 0.03 0.00 - 0.11 K/uL    NRBC (Absolute) 0.00 K/uL   EKG    Collection Time: 12/15/21 12:31 PM   Result Value Ref Range    Report       [facility]    Test Date:  2021-12-15  Pt Name:    NICHOLAS CASEY               Department: [facility]  MRN:        [id number]                      [facility]  Gender:     Male                         Technician: OLI  :        1950                   Requested By:CATHIE PULIDO  Order #:    [id number]                    Reading MD:    Measurements  Intervals                                Axis  Rate:       44                           P:           38  AZ:         209                          QRS:        -24  QRSD:       169                          T:          25  QT:         504  QTc:        432    Interpretive Statements  Sinus bradycardia  Right bundle branch block  Baseline wander in lead(s) V2,V3  Compared to ECG 05/25/2021 12:54:06  No significant changes         Imaging/Procedures Review:    Reviewed    MDM (Assessment and Plan):       A 70 yo male with incomplete quadriplegia from prior spinal cord injury who has an SPT with recurrent UTI and incidental discovery of a 9 mm proximal left ureteral stone. There are no signs of sepsis and renal functions are normal. Plan at this point will be for Hospitalist admission for IV antibiotics. We will plan for left CULTS tomorrow with Dr. Bowen.Case discussed with Dr. Bowen who directed this plan of care.

## 2021-12-15 NOTE — ASSESSMENT & PLAN NOTE
Patient states that he has recently been started on prophylactic antibiotics with nitrofurantoin and Levaquin since the first week of December  Concerns for developing sepsis in the setting of obstructive uropathy.  Urology with recommendations for empiric antibiotic therapy.  Urine culture showing pseudomonas which should be sensitive to zosyn.  Patient has seen NAZ in the past

## 2021-12-16 ENCOUNTER — APPOINTMENT (OUTPATIENT)
Dept: WOUND CARE | Facility: MEDICAL CENTER | Age: 71
End: 2021-12-16
Attending: NURSE PRACTITIONER
Payer: MEDICARE

## 2021-12-16 ENCOUNTER — APPOINTMENT (OUTPATIENT)
Dept: RADIOLOGY | Facility: MEDICAL CENTER | Age: 71
DRG: 659 | End: 2021-12-16
Attending: UROLOGY
Payer: MEDICARE

## 2021-12-16 ENCOUNTER — ANESTHESIA (OUTPATIENT)
Dept: SURGERY | Facility: MEDICAL CENTER | Age: 71
DRG: 659 | End: 2021-12-16
Payer: MEDICARE

## 2021-12-16 ENCOUNTER — ANESTHESIA EVENT (OUTPATIENT)
Dept: SURGERY | Facility: MEDICAL CENTER | Age: 71
DRG: 659 | End: 2021-12-16
Payer: MEDICARE

## 2021-12-16 LAB
ANION GAP SERPL CALC-SCNC: 9 MMOL/L (ref 7–16)
BUN SERPL-MCNC: 16 MG/DL (ref 8–22)
CALCIUM SERPL-MCNC: 8.9 MG/DL (ref 8.4–10.2)
CHLORIDE SERPL-SCNC: 111 MMOL/L (ref 96–112)
CO2 SERPL-SCNC: 21 MMOL/L (ref 20–33)
CREAT SERPL-MCNC: 0.6 MG/DL (ref 0.5–1.4)
ERYTHROCYTE [DISTWIDTH] IN BLOOD BY AUTOMATED COUNT: 51.9 FL (ref 35.9–50)
EXTERNAL QUALITY CONTROL: NEGATIVE
GLUCOSE SERPL-MCNC: 92 MG/DL (ref 65–99)
HCT VFR BLD AUTO: 39.5 % (ref 42–52)
HGB BLD-MCNC: 12.8 G/DL (ref 14–18)
MCH RBC QN AUTO: 34 PG (ref 27–33)
MCHC RBC AUTO-ENTMCNC: 32.4 G/DL (ref 33.7–35.3)
MCV RBC AUTO: 105.1 FL (ref 81.4–97.8)
PLATELET # BLD AUTO: 142 K/UL (ref 164–446)
PMV BLD AUTO: 9.4 FL (ref 9–12.9)
POTASSIUM SERPL-SCNC: 4.2 MMOL/L (ref 3.6–5.5)
RBC # BLD AUTO: 3.76 M/UL (ref 4.7–6.1)
SARS-COV+SARS-COV-2 AG RESP QL IA.RAPID: NEGATIVE
SODIUM SERPL-SCNC: 141 MMOL/L (ref 135–145)
WBC # BLD AUTO: 5.7 K/UL (ref 4.8–10.8)

## 2021-12-16 PROCEDURE — 0T778DZ DILATION OF LEFT URETER WITH INTRALUMINAL DEVICE, VIA NATURAL OR ARTIFICIAL OPENING ENDOSCOPIC: ICD-10-PCS | Performed by: UROLOGY

## 2021-12-16 PROCEDURE — 87205 SMEAR GRAM STAIN: CPT

## 2021-12-16 PROCEDURE — 87186 SC STD MICRODIL/AGAR DIL: CPT

## 2021-12-16 PROCEDURE — A9270 NON-COVERED ITEM OR SERVICE: HCPCS | Performed by: INTERNAL MEDICINE

## 2021-12-16 PROCEDURE — 700117 HCHG RX CONTRAST REV CODE 255: Performed by: UROLOGY

## 2021-12-16 PROCEDURE — 160009 HCHG ANES TIME/MIN: Performed by: UROLOGY

## 2021-12-16 PROCEDURE — 700101 HCHG RX REV CODE 250: Performed by: INTERNAL MEDICINE

## 2021-12-16 PROCEDURE — 85027 COMPLETE CBC AUTOMATED: CPT

## 2021-12-16 PROCEDURE — C1894 INTRO/SHEATH, NON-LASER: HCPCS | Performed by: UROLOGY

## 2021-12-16 PROCEDURE — 700105 HCHG RX REV CODE 258: Performed by: INTERNAL MEDICINE

## 2021-12-16 PROCEDURE — 97161 PT EVAL LOW COMPLEX 20 MIN: CPT

## 2021-12-16 PROCEDURE — C2617 STENT, NON-COR, TEM W/O DEL: HCPCS | Performed by: UROLOGY

## 2021-12-16 PROCEDURE — 770001 HCHG ROOM/CARE - MED/SURG/GYN PRIV*

## 2021-12-16 PROCEDURE — 700111 HCHG RX REV CODE 636 W/ 250 OVERRIDE (IP): Performed by: INTERNAL MEDICINE

## 2021-12-16 PROCEDURE — 87426 SARSCOV CORONAVIRUS AG IA: CPT | Performed by: UROLOGY

## 2021-12-16 PROCEDURE — 501329 HCHG SET, CYSTO IRRIG Y TUR: Performed by: UROLOGY

## 2021-12-16 PROCEDURE — 93298 REM INTERROG DEV EVAL SCRMS: CPT | Performed by: INTERNAL MEDICINE

## 2021-12-16 PROCEDURE — 87077 CULTURE AEROBIC IDENTIFY: CPT

## 2021-12-16 PROCEDURE — 502240 HCHG MISC OR SUPPLY RC 0272: Performed by: UROLOGY

## 2021-12-16 PROCEDURE — 87075 CULTR BACTERIA EXCEPT BLOOD: CPT

## 2021-12-16 PROCEDURE — 87086 URINE CULTURE/COLONY COUNT: CPT

## 2021-12-16 PROCEDURE — 160035 HCHG PACU - 1ST 60 MINS PHASE I: Performed by: UROLOGY

## 2021-12-16 PROCEDURE — 501838 HCHG SUTURE GENERAL: Performed by: UROLOGY

## 2021-12-16 PROCEDURE — 160028 HCHG SURGERY MINUTES - 1ST 30 MINS LEVEL 3: Performed by: UROLOGY

## 2021-12-16 PROCEDURE — 99233 SBSQ HOSP IP/OBS HIGH 50: CPT | Performed by: INTERNAL MEDICINE

## 2021-12-16 PROCEDURE — 87076 CULTURE ANAEROBE IDENT EACH: CPT

## 2021-12-16 PROCEDURE — 0TF78ZZ FRAGMENTATION IN LEFT URETER, VIA NATURAL OR ARTIFICIAL OPENING ENDOSCOPIC: ICD-10-PCS | Performed by: UROLOGY

## 2021-12-16 PROCEDURE — 160039 HCHG SURGERY MINUTES - EA ADDL 1 MIN LEVEL 3: Performed by: UROLOGY

## 2021-12-16 PROCEDURE — 80048 BASIC METABOLIC PNL TOTAL CA: CPT

## 2021-12-16 PROCEDURE — 160048 HCHG OR STATISTICAL LEVEL 1-5: Performed by: UROLOGY

## 2021-12-16 PROCEDURE — 160002 HCHG RECOVERY MINUTES (STAT): Performed by: UROLOGY

## 2021-12-16 PROCEDURE — 500062 HCHG BASKET: Performed by: UROLOGY

## 2021-12-16 PROCEDURE — 500879 HCHG PACK, CYSTO: Performed by: UROLOGY

## 2021-12-16 PROCEDURE — 700102 HCHG RX REV CODE 250 W/ 637 OVERRIDE(OP): Performed by: INTERNAL MEDICINE

## 2021-12-16 DEVICE — STENT UROLOGICAL POLARIS 6X26  ULTRA: Type: IMPLANTABLE DEVICE | Site: URETER | Status: FUNCTIONAL

## 2021-12-16 RX ORDER — ONDANSETRON 2 MG/ML
INJECTION INTRAMUSCULAR; INTRAVENOUS PRN
Status: DISCONTINUED | OUTPATIENT
Start: 2021-12-16 | End: 2021-12-16 | Stop reason: SURG

## 2021-12-16 RX ORDER — HYDROMORPHONE HYDROCHLORIDE 2 MG/ML
0.1 INJECTION, SOLUTION INTRAMUSCULAR; INTRAVENOUS; SUBCUTANEOUS
Status: DISCONTINUED | OUTPATIENT
Start: 2021-12-16 | End: 2021-12-16 | Stop reason: HOSPADM

## 2021-12-16 RX ORDER — MEPERIDINE HYDROCHLORIDE 25 MG/ML
12.5 INJECTION INTRAMUSCULAR; INTRAVENOUS; SUBCUTANEOUS
Status: DISCONTINUED | OUTPATIENT
Start: 2021-12-16 | End: 2021-12-16 | Stop reason: HOSPADM

## 2021-12-16 RX ORDER — ONDANSETRON 2 MG/ML
4 INJECTION INTRAMUSCULAR; INTRAVENOUS
Status: DISCONTINUED | OUTPATIENT
Start: 2021-12-16 | End: 2021-12-16 | Stop reason: HOSPADM

## 2021-12-16 RX ORDER — SODIUM CHLORIDE 9 MG/ML
INJECTION, SOLUTION INTRAVENOUS
Status: DISCONTINUED | OUTPATIENT
Start: 2021-12-16 | End: 2021-12-16 | Stop reason: SURG

## 2021-12-16 RX ORDER — OXYCODONE HCL 5 MG/5 ML
5 SOLUTION, ORAL ORAL
Status: DISCONTINUED | OUTPATIENT
Start: 2021-12-16 | End: 2021-12-16 | Stop reason: HOSPADM

## 2021-12-16 RX ORDER — DEXAMETHASONE SODIUM PHOSPHATE 4 MG/ML
INJECTION, SOLUTION INTRA-ARTICULAR; INTRALESIONAL; INTRAMUSCULAR; INTRAVENOUS; SOFT TISSUE PRN
Status: DISCONTINUED | OUTPATIENT
Start: 2021-12-16 | End: 2021-12-16 | Stop reason: SURG

## 2021-12-16 RX ORDER — HYDROMORPHONE HYDROCHLORIDE 2 MG/ML
0.4 INJECTION, SOLUTION INTRAMUSCULAR; INTRAVENOUS; SUBCUTANEOUS
Status: DISCONTINUED | OUTPATIENT
Start: 2021-12-16 | End: 2021-12-16 | Stop reason: HOSPADM

## 2021-12-16 RX ORDER — DIPHENHYDRAMINE HYDROCHLORIDE 50 MG/ML
12.5 INJECTION INTRAMUSCULAR; INTRAVENOUS
Status: DISCONTINUED | OUTPATIENT
Start: 2021-12-16 | End: 2021-12-16 | Stop reason: HOSPADM

## 2021-12-16 RX ORDER — SODIUM CHLORIDE, SODIUM LACTATE, POTASSIUM CHLORIDE, CALCIUM CHLORIDE 600; 310; 30; 20 MG/100ML; MG/100ML; MG/100ML; MG/100ML
INJECTION, SOLUTION INTRAVENOUS
Status: DISCONTINUED | OUTPATIENT
Start: 2021-12-16 | End: 2021-12-16

## 2021-12-16 RX ORDER — LABETALOL HYDROCHLORIDE 5 MG/ML
5 INJECTION, SOLUTION INTRAVENOUS
Status: DISCONTINUED | OUTPATIENT
Start: 2021-12-16 | End: 2021-12-16 | Stop reason: HOSPADM

## 2021-12-16 RX ORDER — HYDRALAZINE HYDROCHLORIDE 20 MG/ML
5 INJECTION INTRAMUSCULAR; INTRAVENOUS
Status: DISCONTINUED | OUTPATIENT
Start: 2021-12-16 | End: 2021-12-16 | Stop reason: HOSPADM

## 2021-12-16 RX ORDER — HALOPERIDOL 5 MG/ML
1 INJECTION INTRAMUSCULAR
Status: DISCONTINUED | OUTPATIENT
Start: 2021-12-16 | End: 2021-12-16 | Stop reason: HOSPADM

## 2021-12-16 RX ORDER — OXYCODONE HCL 5 MG/5 ML
10 SOLUTION, ORAL ORAL
Status: DISCONTINUED | OUTPATIENT
Start: 2021-12-16 | End: 2021-12-16 | Stop reason: HOSPADM

## 2021-12-16 RX ORDER — HYDROMORPHONE HYDROCHLORIDE 2 MG/ML
0.2 INJECTION, SOLUTION INTRAMUSCULAR; INTRAVENOUS; SUBCUTANEOUS
Status: DISCONTINUED | OUTPATIENT
Start: 2021-12-16 | End: 2021-12-16 | Stop reason: HOSPADM

## 2021-12-16 RX ORDER — LIDOCAINE HYDROCHLORIDE 20 MG/ML
INJECTION, SOLUTION EPIDURAL; INFILTRATION; INTRACAUDAL; PERINEURAL PRN
Status: DISCONTINUED | OUTPATIENT
Start: 2021-12-16 | End: 2021-12-16 | Stop reason: SURG

## 2021-12-16 RX ADMIN — GLYCOPYRROLATE 0.2 MG: 0.2 INJECTION, SOLUTION INTRAMUSCULAR; INTRAVENOUS at 16:41

## 2021-12-16 RX ADMIN — SODIUM CHLORIDE: 9 INJECTION, SOLUTION INTRAVENOUS at 16:15

## 2021-12-16 RX ADMIN — FENTANYL CITRATE 50 MCG: 50 INJECTION, SOLUTION INTRAMUSCULAR; INTRAVENOUS at 16:38

## 2021-12-16 RX ADMIN — HEPARIN SODIUM 5000 UNITS: 5000 INJECTION, SOLUTION INTRAVENOUS; SUBCUTANEOUS at 06:15

## 2021-12-16 RX ADMIN — PIPERACILLIN AND TAZOBACTAM 3.38 G: 3; .375 INJECTION, POWDER, LYOPHILIZED, FOR SOLUTION INTRAVENOUS; PARENTERAL at 06:13

## 2021-12-16 RX ADMIN — BACLOFEN 20 MG: 10 TABLET ORAL at 18:47

## 2021-12-16 RX ADMIN — DOCUSATE SODIUM 200 MG: 100 CAPSULE, LIQUID FILLED ORAL at 18:23

## 2021-12-16 RX ADMIN — ONDANSETRON 4 MG: 2 INJECTION INTRAMUSCULAR; INTRAVENOUS at 16:51

## 2021-12-16 RX ADMIN — VANCOMYCIN HYDROCHLORIDE 1000 MG: 1 INJECTION, POWDER, LYOPHILIZED, FOR SOLUTION INTRAVENOUS at 07:29

## 2021-12-16 RX ADMIN — GLYCOPYRROLATE 0.2 MG: 0.2 INJECTION, SOLUTION INTRAMUSCULAR; INTRAVENOUS at 16:29

## 2021-12-16 RX ADMIN — PIPERACILLIN AND TAZOBACTAM 3.38 G: 3; .375 INJECTION, POWDER, LYOPHILIZED, FOR SOLUTION INTRAVENOUS; PARENTERAL at 21:07

## 2021-12-16 RX ADMIN — VANCOMYCIN HYDROCHLORIDE 1000 MG: 1 INJECTION, POWDER, LYOPHILIZED, FOR SOLUTION INTRAVENOUS at 18:23

## 2021-12-16 RX ADMIN — Medication 10 MG: at 21:06

## 2021-12-16 RX ADMIN — LIDOCAINE HYDROCHLORIDE 80 MG: 20 INJECTION, SOLUTION EPIDURAL; INFILTRATION; INTRACAUDAL; PERINEURAL at 16:21

## 2021-12-16 RX ADMIN — HEPARIN SODIUM 5000 UNITS: 5000 INJECTION, SOLUTION INTRAVENOUS; SUBCUTANEOUS at 21:54

## 2021-12-16 RX ADMIN — PROPOFOL 200 MG: 10 INJECTION, EMULSION INTRAVENOUS at 16:21

## 2021-12-16 RX ADMIN — BACLOFEN 20 MG: 10 TABLET ORAL at 21:06

## 2021-12-16 RX ADMIN — PIPERACILLIN AND TAZOBACTAM 3.38 G: 3; .375 INJECTION, POWDER, LYOPHILIZED, FOR SOLUTION INTRAVENOUS; PARENTERAL at 13:27

## 2021-12-16 RX ADMIN — SENNOSIDES AND DOCUSATE SODIUM 2 TABLET: 50; 8.6 TABLET ORAL at 18:23

## 2021-12-16 RX ADMIN — DEXAMETHASONE SODIUM PHOSPHATE 4 MG: 4 INJECTION, SOLUTION INTRAMUSCULAR; INTRAVENOUS at 16:21

## 2021-12-16 RX ADMIN — FENTANYL CITRATE 50 MCG: 50 INJECTION, SOLUTION INTRAMUSCULAR; INTRAVENOUS at 16:21

## 2021-12-16 ASSESSMENT — ENCOUNTER SYMPTOMS
DEPRESSION: 0
ABDOMINAL PAIN: 0
BLURRED VISION: 0
MYALGIAS: 0
HEADACHES: 0
WEAKNESS: 0
HEARTBURN: 0
DIZZINESS: 0
PHOTOPHOBIA: 0
CLAUDICATION: 0
SPEECH CHANGE: 0
CONSTIPATION: 0
CHILLS: 0
SENSORY CHANGE: 0
SORE THROAT: 0
VOMITING: 0
SHORTNESS OF BREATH: 0
DIARRHEA: 0
COUGH: 0
FEVER: 0
NERVOUS/ANXIOUS: 0
INSOMNIA: 0

## 2021-12-16 ASSESSMENT — PAIN DESCRIPTION - PAIN TYPE: TYPE: ACUTE PAIN

## 2021-12-16 NOTE — FACE TO FACE
Face to Face Supporting Documentation - Home Health    The encounter with this patient was in whole or in part the primary reason for home health admission.    Date of encounter:   Patient:                    MRN:                       YOB: 2021  Osvaldo Pate  [id number]  1950     Home health to see patient for:  Skilled Nursing care for assessment, interventions & education, Wound Care, Physical Therapy evaluation and treatment and Occupational therapy evaluation and treatment    Skilled need for:  Exacerbation of Chronic Disease State sacral decubitus ulcer and paraplegia    Skilled nursing interventions to include:  Wound Care    Homebound status evidenced by:  Needs the assistance of another person in order to leave the home. Leaving home requires a considerable and taxing effort. There is a normal inability to leave the home.    Community Physician to provide follow up care: KATE Pretty     Optional Interventions? No      I certify the face to face encounter for this home health care referral meets the CMS requirements and the encounter/clinical assessment with the patient was, in whole, or in part, for the medical condition(s) listed above, which is the primary reason for home health care. Based on my clinical findings: the service(s) are medically necessary, support the need for home health care, and the homebound criteria are met.  I certify that this patient has had a face to face encounter by myself.  Amber Zuniga D.O. - NPI: [id number]

## 2021-12-16 NOTE — HOSPITAL COURSE
Osvaldo Pate is a 71 y.o. male who presented 12/15/2021 with C7 injury and subsequent paraplegia, decubitus ulcers, and recurrent UTIs who presents to the ED with abnormal imaging.  The patient was apparently well until the day prior to admission when he had a CT scan done to try and find a reason for frequent UTI's.  The CT revealed a 9 mm ureteral stone on the left side with associated hydronephrosis.  Due to the patient's sensory deficits at baseline, he is unable to identify any suprapubic pain or abdominal pain.  He does not have any associated chest pains, palpitations, confusion, or fever/chills.  Due to his complicated history of recurrent UTIs with MRSA and Pseudomonas he was advised by his urologist to seek consult at her institution for stone removal.  Urology consulted and planning for L CULTS on this admission.

## 2021-12-16 NOTE — PROGRESS NOTES
Received bedside report from ZAIRA Russo, pt care assumed. Contact isolation precautions in place. VSS, pt assessment complete. Pt A&Ox4, no c/o pain at this time. POC discussed with pt and verbalizes no questions. Pt denies any additional needs at this time. Bed locked and in lowest position, bed alarm is on. Pt educated on fall risk and verbalized understanding, call light within reach, hourly rounding initiated.

## 2021-12-16 NOTE — WOUND TEAM
[facility]  Inpatient Services  Initial Wound and Skin Care Evaluation    Admission Date: 12/15/2021     Last order of IP CONSULT TO WOUND CARE was found on 12/15/2021 from Hospital Encounter on 12/15/2021     HPI, PMH, SH: Reviewed    Past Surgical History:   Procedure Laterality Date   • IRRIGATION & DEBRIDEMENT GENERAL  2020    Procedure: IRRIGATION AND DEBRIDEMENT, WOUND SACRAL ULCER;  Surgeon: Matt Cummins M.D.;  Location: SURGERY [facility];  Service: Plastics   • ULCER DEBRIDEMENT N/A 2019    Procedure: debridement of Sacral grade 4 ulcer - W/BONE BIOPSY, 3 liter wash out. bilateral sliding gluteal myocutaneous flap advancement;  Surgeon: Amadeo Moon M.D.;  Location: [facility];  Service: Plastics   • FLAP CLOSURE  2019    Procedure: CLOSURE, FLAP - MUSCLE;  Surgeon: Amadeo Moon M.D.;  Location: [facility];  Service: Plastics   • COLOSTOMY N/A 2019    Procedure: CREATION, COLOSTOMY -  placement;  Surgeon: Elías Hannah M.D.;  Location: [facility];  Service: General   • COLOSTOMY     • COLOSTOMY TAKEDOWN     • HERNIA REPAIR     • PERCUTANEOUOSPINNING LOWER EXTREMITY       Social History     Tobacco Use   • Smoking status: Former Smoker     Packs/day: 1.00     Types: Cigarettes     Start date: 1967     Quit date: 1977     Years since quittin.9   • Smokeless tobacco: Never Used   Substance Use Topics   • Alcohol use: Yes     Alcohol/week: 0.6 oz     Types: 1 Standard drinks or equivalent per week     Comment: nightly     Chief Complaint   Patient presents with   • Nephrolithiasis     Diagnosis: Obstructive uropathy [N13.9]    Unit where seen by Wound Team: SMPREPOOL/NONE     WOUND CONSULT/FOLLOW UP RELATED TO: sacrum    WOUND HISTORY:  Pt presented to ED after a kidney stone was found by urology and there is a concern for infection.   Pt describes an unusual wound history of the coccyx wound.  He  states he had a VAC on until 6/2021.  At some point later black foam from the VAC was detected in the wound and it was recommended he go to OP wound care to have this addressed.  Pt seen at OP wound clinic on 11/11/21 and the clinician attempted to remove black VAC foam from the wound.  OP wound clinician recommended plastic surgery to have foam removed.  Pt states he had an appt with Dr Moon 12/17/21.    [place] [facility] [place]ral wound          [place] [facility] [place]  With sacral wound           WOUND ASSESSMENT/LDA          Wound 12/16/21 Sacrum (Active)   Wound Image    12/16/21 1400   Site Assessment Red;Black 12/16/21 1400   Periwound Assessment Scar tissue 12/16/21 1400   Drainage Amount Small 12/16/21 1400   Drainage Description Serosanguineous 12/16/21 1400   Dressing Options Hydrogel;Mepilex 12/16/21 1400   Dressing Changed Changed 12/16/21 1400   Dressing Change/Treatment Frequency Every 48 hrs, and As Needed 12/16/21 1400   NEXT Dressing Change/Treatment Date 12/18/21 12/16/21 1400   NEXT Weekly Photo (Inpatient Only) 12/23/21 12/16/21 1400   Pressure Injury Stage U 12/16/21 1400   Wound Length (cm) 0.7 cm 12/16/21 1400   Wound Width (cm) 1.7 cm 12/16/21 1400   Wound Depth (cm) 0.6 cm 12/16/21 1400   Wound Surface Area (cm^2) 1.19 cm^2 12/16/21 1400   Wound Volume (cm^3) 0.714 cm^3 12/16/21 1400   Wound Odor None 12/16/21 1400   Exposed Structures KEN 12/16/21 1400   WOUND NURSE ONLY - Time Spent with Patient (mins) 60 12/16/21 1400        Vascular:    JUAN MANUEL:   No results found.    Lab Values:    Lab Results   Component Value Date/Time    WBC 5.7 12/16/2021 05:55 AM    RBC 3.76 (L) 12/16/2021 05:55 AM    HEMOGLOBIN 12.8 (L) 12/16/2021 05:55 AM    HEMATOCRIT 39.5 (L) 12/16/2021 05:55 AM    CREACTPROT 4.53 (H) 12/18/2020 02:25 PM    SEDRATEWES 71 (H) 12/18/2020 02:25 PM        Culture Results show:  Recent Results (from the past 720 hour(s))   CULTURE WOUND W/ GRAM STAIN     Collection Time: 12/09/21  9:30 AM    Specimen: Wound   Result Value Ref Range    Significant Indicator POS (POS)     Source WND     Site suprpaubic cath, insertion site     Culture Result - (A)     Gram Stain Result       Many WBCs.  Many Gram negative rods.  Few Gram positive cocci.      Culture Result (A)      Methicillin Resistant Staphylococcus aureus  Moderate growth      Culture Result Pseudomonas aeruginosa  Moderate growth   (A)     Culture Result (A)      Pseudomonas aeruginosa  Light growth  Second morphology         Susceptibility    Methicillin resistant staphylococcus aureus - ROSE     Azithromycin >4 Resistant mcg/mL     Clindamycin >4 Resistant mcg/mL     Cefazolin >16 Resistant mcg/mL     Cefepime >16 Resistant mcg/mL     Ceftaroline 1 Sensitive mcg/mL     Daptomycin 1 Sensitive mcg/mL     Erythromycin >4 Resistant mcg/mL     Ampicillin/sulbactam <=8/4 Resistant mcg/mL     Oxacillin >2 Resistant mcg/mL     Trimeth/Sulfa <=0.5/9.5 Sensitive mcg/mL     Tetracycline <=4 Sensitive mcg/mL     Vancomycin 2 Sensitive mcg/mL    Pseudomonas aeruginosa - ROSE     Ciprofloxacin >2 Resistant mcg/mL     Cefepime <=2 Sensitive mcg/mL     Gentamicin 4 Sensitive mcg/mL     Tobramycin <=2 Sensitive mcg/mL     Meropenem <=1 Sensitive mcg/mL     Amikacin <=16 Sensitive mcg/mL     Pip/Tazobactam <=8 Sensitive mcg/mL    Pseudomonas aeruginosa - ROSE     Ciprofloxacin <=0.25 Sensitive mcg/mL     Cefepime 4 Sensitive mcg/mL     Gentamicin 4 Sensitive mcg/mL     Tobramycin <=2 Sensitive mcg/mL     Meropenem <=1 Sensitive mcg/mL     Amikacin <=16 Sensitive mcg/mL     Pip/Tazobactam <=8 Sensitive mcg/mL       Pain Level/Medicated:  No report of pain    INTERVENTIONS BY WOUND TEAM:   Performed standard wound care which includes appropriate positioning, dressing removal and non-selective debridement. Pictures and measurements obtained weekly if/when required.  Preparation for Dressing removal:   Non-selectively Debrided with:  saline and gauze.  Sharp debridement: attempted to remove black material from wound, a small amount of small pieces of black material removed  Raeann wound: Cleansed with saline, Prepped with skin prep  Primary Dressing: hydrogel, gauze bolster, mepilex  Secondary (Outer) Dressing:  Orders for nursing to use aquacel ag for future dressings to be changed every 48 hours    Interdisciplinary consultation: Patient, Bedside RN Claritza, ADILSON, LPS.  Updated Dr Zuniga - the recommendation is for pt to follow up with plastic surgeon as an OP since there are no acute s&s of infection around the wound.      EVALUATION / RATIONALE FOR TREATMENT:  Most Recent Date:  : 12/16/21 Pt with small opening with raeann scar tissue from skin flap procedure.  The portal to wound is small and at this time does not probe deeply.  There is material in wound bed that may be VAC foam.  Thorough cleaning and removal of any foreign matter will be necessary for full assessment of the extent of this wound.  Impressed upon pt that follow up for this wound ASAP is very important.         Goals: Steady decrease in wound area and depth weekly.    WOUND TEAM PLAN OF CARE ([X] for frequency of wound follow up,):   Nursing to follow orders written for wound care. Contact wound team if area fails to progress, deteriorates or with any questions/concerns  Dressing changes by wound team:                   Follow up 3 times weekly:                NPWT change 3 times weekly:     Follow up 1-2 times weekly:      Follow up Bi-Monthly:                   Follow up as needed:   X  Other (explain):     NURSING PLAN OF CARE ORDERS (X):  Dressing changes: See Dressing Care orders: X  Skin care: See Skin Care orders:   RN Prevention Protocol:   Rectal tube care: See Rectal Tube Care orders:   Other orders:    RSKIN:   CURRENTLY IN PLACE (X), APPLIED THIS VISIT (A), ORDERED (O):   Q shift Luis Enrique:  X  Q shift pressure point assessments:  X    Surface/Positioning   Pressure  redistribution mattress            Low Airloss       X   Bariatric foam      Bariatric JERARDO     Waffle cushion        Waffle Overlay          Reposition q 2 hours    X  TAPs Turning system   X  Z Pankaj Pillow     Offloading/Redistribution   Sacral Mepilex (Silicone dressing)   X  Heel Mepilex (Silicone dressing)         Heel float boots (Prevalon boot)             Float Heels off Bed with Pillows           Respiratory   Silicone O2 tubing         Gray Foam Ear protectors     Cannula fixation Device (Tender )          High flow offloading Clip    Elastic head band offloading device      Anchorfast                                                         Trach with Optifoam split foam             Containment/Moisture Prevention     Rectal tube or BMS    Purwick/Condom Cath        Cazares Catheter    Barrier wipes           Barrier paste       Antifungal tx      Interdry        Mobilization       Up to chair        Ambulate      PT/OT      Nutrition       Dietician        Diabetes Education      PO     TF     TPN     NPO   # days     Other        Anticipated discharge plans:   LTACH:        SNF/Rehab:                  Home Health Care:         X pt will need ongoing wound care upon discharge.  Pt was receiving HH prior this admission.    Outpatient Wound Center:            Self/Family Care:        Other:  X - pt must follow up ASAP with plastic surgeon Dr Moon for sacral wound assessment.  Pt had an appt with Dr Moon 12/17/21 (per pt) but will not make this appt due to inpt admission.                  Vac Discharge Needs:   Not Applicable Pt not on a wound vac:     X  Regular Vac while inpatient, alternative dressing at DC:        Regular Vac in use and continued at DC:            Reg. Vac w/ Skin Sub/Biologic in use. Will need to be changed 2x wkly:      Veraflo Vac while inpatient, ok to transition to Regular Vac on Discharge:           Veraflo Vac while inpatient, will need to remain on Veraflo Vac upon discharge:

## 2021-12-16 NOTE — DIETARY
[employer] - Alert received for stage 4 pressure ulcer on problem list. Wound team consult pending, will await wound staging to make recommendations if appropriate.     RD will monitor per dept policy.

## 2021-12-16 NOTE — CARE PLAN
The patient is Watcher - Medium risk of patient condition declining or worsening         Progress made toward(s) clinical / shift goals:    Problem: Skin Integrity  Goal: Skin integrity is maintained or improved  Outcome: Progressing   Pt is being turned Q2 hr. Pt has a waffle overlay in place and float boots. Pt discussed with RN about his wound to his sacrum. Pt states it was last changed on 12/14 and it is to be changed every three days. Wound to be consulted.     Problem: Fall Risk  Goal: Patient will remain free from falls  Outcome: Progressing     Problem: Knowledge Deficit - Standard  Goal: Patient and family/care givers will demonstrate understanding of plan of care, disease process/condition, diagnostic tests and medications  Outcome: Progressing   Discussed POC with pt. He is aware that he will be NPO at midnight for ureteral stone removal. Admit profile completed and pt was made aware of his surroundings. Call light is within in reach.   Pt also discussed his needs with RN.     Patient is not progressing towards the following goals:

## 2021-12-16 NOTE — ED NOTES
Pt upset that his bowel medications have not been ordered how he takes them. Paged on call MD to make aware that the pt would like us to add his home dose of colace to his MAR.     Per Dr Gar may add colace to MAR.

## 2021-12-16 NOTE — PROGRESS NOTES
Patient off floor to surgery with Pre-Op RN's. VSS, no signs of distress noted at this time. Chart is with patient at bedside.

## 2021-12-16 NOTE — ED NOTES
Pt placed in hospital bed with waffle mattress topper with use of brigid lift. Call light in reach. No needs at this time.

## 2021-12-16 NOTE — PROGRESS NOTES
[facility] Medicine Daily Progress Note    Date of Service  12/16/2021    Chief Complaint  Osvaldo Pate is a 71 y.o. male admitted 12/15/2021 after CT showed left hydronephrosis.    Hospital Course  Osvaldo Pate is a 71 y.o. male who presented 12/15/2021 with C7 injury and subsequent paraplegia, decubitus ulcers, and recurrent UTIs who presents to the ED with abnormal imaging.  The patient was apparently well until the day prior to admission when he had a CT scan done to try and find a reason for frequent UTI's.  The CT revealed a 9 mm ureteral stone on the left side with associated hydronephrosis.  Due to the patient's sensory deficits at baseline, he is unable to identify any suprapubic pain or abdominal pain.  He does not have any associated chest pains, palpitations, confusion, or fever/chills.  Due to his complicated history of recurrent UTIs with MRSA and Pseudomonas he was advised by his urologist to seek consult at her institution for stone removal.  Urology consulted and planning for L CULTS on this admission.      Interval Problem Update  12/16 Patient states he feels fine, no symptoms including fever or chills.  He has a C7 paraplegia.  Urine culture from 12/9 showed MRSA and pseudomonas so he is empirically on vanco/zosyn currently until new cultures result.  He has CULTS today with Dr Bowen at 1500.      I have personally seen and examined the patient at bedside. I discussed the plan of care with patient, bedside RN, charge RN,  and pharmacy.    Consultants/Specialty  urology    Code Status  Full Code    Disposition  Patient is not medically cleared.   Anticipate discharge to to home with organized home healthcare and close outpatient follow-up.  I have placed the appropriate orders for post-discharge needs.    Review of Systems  Review of Systems   Constitutional: Negative for chills and fever.   HENT: Negative for congestion and sore throat.    Eyes: Negative for  blurred vision and photophobia.   Respiratory: Negative for cough and shortness of breath.    Cardiovascular: Negative for chest pain, claudication and leg swelling.   Gastrointestinal: Negative for abdominal pain, constipation, diarrhea, heartburn and vomiting.   Genitourinary: Negative for dysuria and hematuria.   Musculoskeletal: Negative for joint pain and myalgias.   Skin: Negative for itching and rash.   Neurological: Negative for dizziness, sensory change, speech change, weakness and headaches.   Psychiatric/Behavioral: Negative for depression. The patient is not nervous/anxious and does not have insomnia.         Physical Exam  Temp:  [36.3 °C (97.4 °F)-36.7 °C (98.1 °F)] 36.7 °C (98.1 °F)  Pulse:  [43-56] 56  Resp:  [16-18] 18  BP: ()/(54-69) 183/65  SpO2:  [93 %-98 %] 96 %    Physical Exam  Vitals and nursing note reviewed.   Constitutional:       General: He is not in acute distress.     Appearance: Normal appearance.   HENT:      Head: Normocephalic and atraumatic.   Eyes:      General: No scleral icterus.     Extraocular Movements: Extraocular movements intact.   Cardiovascular:      Rate and Rhythm: Normal rate and regular rhythm.      Pulses: Normal pulses.      Heart sounds: Normal heart sounds. No murmur heard.      Pulmonary:      Effort: Pulmonary effort is normal. No respiratory distress.      Breath sounds: Normal breath sounds. No wheezing, rhonchi or rales.   Abdominal:      General: Abdomen is flat. Bowel sounds are normal. There is no distension.      Palpations: Abdomen is soft.      Tenderness: There is no rebound.      Comments: Ostomy in place     Musculoskeletal:         General: No swelling or tenderness.      Cervical back: Normal range of motion and neck supple.   Lymphadenopathy:      Cervical: No cervical adenopathy.   Skin:     Coloration: Skin is not jaundiced.      Findings: No erythema.   Neurological:      Mental Status: He is alert and oriented to person, place, and  time. Mental status is at baseline.      Cranial Nerves: No cranial nerve deficit.      Comments: Paraplegia     Psychiatric:         Mood and Affect: Mood normal.         Behavior: Behavior normal.         Fluids    Intake/Output Summary (Last 24 hours) at 12/16/2021 1148  Last data filed at 12/16/2021 0639  Gross per 24 hour   Intake 608.46 ml   Output 1200 ml   Net -591.54 ml       Laboratory  Recent Labs     12/15/21  1230 12/16/21  0555   WBC 6.2 5.7   RBC 3.84* 3.76*   HEMOGLOBIN 13.0* 12.8*   HEMATOCRIT 40.3* 39.5*   .9* 105.1*   MCH 33.9* 34.0*   MCHC 32.3* 32.4*   RDW 51.1* 51.9*   PLATELETCT 148* 142*   MPV 8.8* 9.4     Recent Labs     12/15/21  1140 12/16/21  0555   SODIUM 141 141   POTASSIUM 4.6 4.2   CHLORIDE 109 111   CO2 20 21   GLUCOSE 90 92   BUN 14 16   CREATININE 0.70 0.60   CALCIUM 8.9 8.9     Recent Labs     12/15/21  1140   APTT 32.6   INR 1.16*               Imaging  DX-CHEST-PORTABLE (1 VIEW)   Final Result      No radiographic evidence of acute cardiopulmonary process.           Assessment/Plan  * Obstructive uropathy- (present on admission)  Assessment & Plan  The patient presents as a admitted from his urologist office.  CT scan was done at that time and showed 9 mm obstructive stone in the left renal system.  Their recommendations were for stone removal tomorrow morning.  Patient has a history of frequent urinary tract infections with previous cultures growing Pseudomonas and MRSA.  At this time, patient denies any fevers or chills.  Unable to identify any suprapubic pain because of history of sensory deficit from C7 injury.  He has not noticed any change in the quality or quantity of his urine into the Cazares bag.  Due to his complicated medical history, urology with recommendations for antibiotics based on previous cultures and stone removal in the morning.  Patient without acute renal failure  -Urology with recommendations for removal of 9 mm obstructive stone today at 1500 with  Dr Bowen  -Due to patient's complicated history and history of recurrent UTIs they recommend empiric antibiotic therapy  -Tailor antibiotics based on urinalysis as well as urine culture  History of MRSA and pseudomonas on culture 12/9, CT was done day of admission to see if source of recurrent infection could be found, patient given instruction to present to the ED for antibiotics, admission and surgical intervention with urology.      Frequent UTI  Assessment & Plan  Patient states that he has recently been started on prophylactic antibiotics with nitrofurantoin and Levaquin since the first week of December  Concerns for developing sepsis in the setting of obstructive uropathy.  Urology with recommendations for empiric antibiotic therapy.  Pending results of UA and urine cultures.  Patient has seen [facility] in the past, may consult them depending on results of culture.       Bradycardia- (present on admission)  Assessment & Plan  No heart block identified on EKG  Medications reviewed  Continue to monitor    Neurogenic bladder- (present on admission)  Assessment & Plan  Patient with suprapubic catheter which is working well  Pending final urology recommendations    Chronic incomplete quadriplegia (HCC)- (present on admission)  Assessment & Plan  Pending PT/OT  Frequent turning and wound care    Essential hypertension- (present on admission)  Assessment & Plan  Currently well controlled  Continue home dose of amlodipine and losartan    Paroxysmal atrial flutter (HCC)- (present on admission)  Assessment & Plan  History of  Currently in sinus bradycardia  We will need to continue outpatient follow-up    Pressure injury of sacral region, stage 4 (HCC)- (present on admission)  Assessment & Plan  Wound care has been consulted  PT/OT          VTE prophylaxis: heparin ppx    I have performed a physical exam and reviewed and updated ROS and Plan today (12/16/2021). In review of yesterday's note (12/15/2021), there are no  changes except as documented above.

## 2021-12-17 ENCOUNTER — NON-PROVIDER VISIT (OUTPATIENT)
Dept: CARDIOLOGY | Facility: MEDICAL CENTER | Age: 71
End: 2021-12-17
Payer: MEDICARE

## 2021-12-17 LAB
ANION GAP SERPL CALC-SCNC: 11 MMOL/L (ref 7–16)
BUN SERPL-MCNC: 12 MG/DL (ref 8–22)
CALCIUM SERPL-MCNC: 8.6 MG/DL (ref 8.4–10.2)
CHLORIDE SERPL-SCNC: 111 MMOL/L (ref 96–112)
CO2 SERPL-SCNC: 21 MMOL/L (ref 20–33)
CREAT SERPL-MCNC: 0.62 MG/DL (ref 0.5–1.4)
ERYTHROCYTE [DISTWIDTH] IN BLOOD BY AUTOMATED COUNT: 51.5 FL (ref 35.9–50)
GLUCOSE SERPL-MCNC: 116 MG/DL (ref 65–99)
GRAM STN SPEC: NORMAL
HCT VFR BLD AUTO: 38.6 % (ref 42–52)
HGB BLD-MCNC: 12.4 G/DL (ref 14–18)
MCH RBC QN AUTO: 33.9 PG (ref 27–33)
MCHC RBC AUTO-ENTMCNC: 32.1 G/DL (ref 33.7–35.3)
MCV RBC AUTO: 105.5 FL (ref 81.4–97.8)
PLATELET # BLD AUTO: 145 K/UL (ref 164–446)
PMV BLD AUTO: 9.4 FL (ref 9–12.9)
POTASSIUM SERPL-SCNC: 4.2 MMOL/L (ref 3.6–5.5)
RBC # BLD AUTO: 3.66 M/UL (ref 4.7–6.1)
SIGNIFICANT IND 70042: NORMAL
SITE SITE: NORMAL
SODIUM SERPL-SCNC: 143 MMOL/L (ref 135–145)
SOURCE SOURCE: NORMAL
VANCOMYCIN PEAK 2573: 24.5 UG/ML (ref 20–40)
WBC # BLD AUTO: 5.8 K/UL (ref 4.8–10.8)

## 2021-12-17 PROCEDURE — 700102 HCHG RX REV CODE 250 W/ 637 OVERRIDE(OP): Performed by: INTERNAL MEDICINE

## 2021-12-17 PROCEDURE — A9270 NON-COVERED ITEM OR SERVICE: HCPCS | Performed by: INTERNAL MEDICINE

## 2021-12-17 PROCEDURE — 700111 HCHG RX REV CODE 636 W/ 250 OVERRIDE (IP): Performed by: INTERNAL MEDICINE

## 2021-12-17 PROCEDURE — 80202 ASSAY OF VANCOMYCIN: CPT

## 2021-12-17 PROCEDURE — 85027 COMPLETE CBC AUTOMATED: CPT

## 2021-12-17 PROCEDURE — 700101 HCHG RX REV CODE 250: Performed by: INTERNAL MEDICINE

## 2021-12-17 PROCEDURE — 700105 HCHG RX REV CODE 258: Performed by: INTERNAL MEDICINE

## 2021-12-17 PROCEDURE — 770001 HCHG ROOM/CARE - MED/SURG/GYN PRIV*

## 2021-12-17 PROCEDURE — 99232 SBSQ HOSP IP/OBS MODERATE 35: CPT | Performed by: INTERNAL MEDICINE

## 2021-12-17 PROCEDURE — 80048 BASIC METABOLIC PNL TOTAL CA: CPT

## 2021-12-17 PROCEDURE — 99999 PR NO CHARGE: CPT | Performed by: INTERNAL MEDICINE

## 2021-12-17 RX ADMIN — DOCUSATE SODIUM 200 MG: 100 CAPSULE, LIQUID FILLED ORAL at 05:05

## 2021-12-17 RX ADMIN — SENNOSIDES AND DOCUSATE SODIUM 2 TABLET: 50; 8.6 TABLET ORAL at 05:05

## 2021-12-17 RX ADMIN — AMLODIPINE BESYLATE 10 MG: 5 TABLET ORAL at 05:05

## 2021-12-17 RX ADMIN — PIPERACILLIN AND TAZOBACTAM 3.38 G: 3; .375 INJECTION, POWDER, LYOPHILIZED, FOR SOLUTION INTRAVENOUS; PARENTERAL at 13:57

## 2021-12-17 RX ADMIN — POLYETHYLENE GLYCOL 3350 1 PACKET: 17 POWDER, FOR SOLUTION ORAL at 22:13

## 2021-12-17 RX ADMIN — DOCUSATE SODIUM 200 MG: 100 CAPSULE, LIQUID FILLED ORAL at 17:49

## 2021-12-17 RX ADMIN — VANCOMYCIN HYDROCHLORIDE 1000 MG: 1 INJECTION, POWDER, LYOPHILIZED, FOR SOLUTION INTRAVENOUS at 09:39

## 2021-12-17 RX ADMIN — BACLOFEN 20 MG: 10 TABLET ORAL at 13:57

## 2021-12-17 RX ADMIN — HEPARIN SODIUM 5000 UNITS: 5000 INJECTION, SOLUTION INTRAVENOUS; SUBCUTANEOUS at 05:05

## 2021-12-17 RX ADMIN — Medication 10 MG: at 22:04

## 2021-12-17 RX ADMIN — HEPARIN SODIUM 5000 UNITS: 5000 INJECTION, SOLUTION INTRAVENOUS; SUBCUTANEOUS at 22:04

## 2021-12-17 RX ADMIN — BACLOFEN 20 MG: 10 TABLET ORAL at 22:04

## 2021-12-17 RX ADMIN — SENNOSIDES AND DOCUSATE SODIUM 2 TABLET: 50; 8.6 TABLET ORAL at 17:49

## 2021-12-17 RX ADMIN — BACLOFEN 20 MG: 10 TABLET ORAL at 17:49

## 2021-12-17 RX ADMIN — HEPARIN SODIUM 5000 UNITS: 5000 INJECTION, SOLUTION INTRAVENOUS; SUBCUTANEOUS at 13:57

## 2021-12-17 RX ADMIN — PIPERACILLIN AND TAZOBACTAM 3.38 G: 3; .375 INJECTION, POWDER, LYOPHILIZED, FOR SOLUTION INTRAVENOUS; PARENTERAL at 22:04

## 2021-12-17 RX ADMIN — PIPERACILLIN AND TAZOBACTAM 3.38 G: 3; .375 INJECTION, POWDER, LYOPHILIZED, FOR SOLUTION INTRAVENOUS; PARENTERAL at 05:06

## 2021-12-17 RX ADMIN — VANCOMYCIN HYDROCHLORIDE 1000 MG: 1 INJECTION, POWDER, LYOPHILIZED, FOR SOLUTION INTRAVENOUS at 18:07

## 2021-12-17 RX ADMIN — BACLOFEN 20 MG: 10 TABLET ORAL at 08:49

## 2021-12-17 ASSESSMENT — FIBROSIS 4 INDEX
FIB4 SCORE: 2.36
FIB4 SCORE: 2.36

## 2021-12-17 ASSESSMENT — ENCOUNTER SYMPTOMS
NERVOUS/ANXIOUS: 0
NAUSEA: 0
INSOMNIA: 0
HEADACHES: 0
SORE THROAT: 0
CHILLS: 0
PHOTOPHOBIA: 0
COUGH: 0
SPEECH CHANGE: 0
DIARRHEA: 0
WEAKNESS: 0
ABDOMINAL PAIN: 0
MYALGIAS: 0
DIZZINESS: 0
FLANK PAIN: 0
DEPRESSION: 0
SHORTNESS OF BREATH: 0
CLAUDICATION: 0
HEARTBURN: 0
SENSORY CHANGE: 0
CONSTIPATION: 0
BLURRED VISION: 0
FEVER: 0
VOMITING: 0

## 2021-12-17 ASSESSMENT — PAIN DESCRIPTION - PAIN TYPE: TYPE: ACUTE PAIN

## 2021-12-17 NOTE — DISCHARGE PLANNING
Received Choice form at 1332  Agency/Facility Name: [facility]  Referral sent per Choice form @ [phone]  Agency/Facility Name: [facility]  Spoke To: Doc  Outcome: Per doc pt accepted but we do have to fix the order because the Face to face does not match as OT is on one and not the other

## 2021-12-17 NOTE — ANESTHESIA POSTPROCEDURE EVALUATION
Patient: Osvaldo Pate    Procedure Summary     Date: 12/16/21 Room / Location:  OR  / SURGERY [facility]    Anesthesia Start: 1615 Anesthesia Stop: 1705    Procedures:       CYSTOSCOPY, WITH URETERAL STENT INSERTION (Left Ureter)      LITHOTRIPSY, USING LASER (Left Ureter)      URETEROSCOPY (Left Ureter) Diagnosis: (9 mm proximal left ureteral stone)    Surgeons: Aly Bowen M.D. Responsible Provider: Emile Ureña M.D.    Anesthesia Type: general ASA Status: 3 - Emergent          Final Anesthesia Type: general  Last vitals  BP   Blood Pressure : 156/58    Temp   36.3 °C (97.3 °F)    Pulse   (!) 41   Resp   16    SpO2   95 %      Anesthesia Post Evaluation    Patient location during evaluation: PACU  Patient participation: complete - patient participated  Level of consciousness: awake and alert    Airway patency: patent  Anesthetic complications: no  Cardiovascular status: hemodynamically stable  Respiratory status: acceptable  Hydration status: euvolemic    PONV: none          No complications documented.     Nurse Pain Score: 0 (NPRS)

## 2021-12-17 NOTE — ANESTHESIA TIME REPORT
Anesthesia Start and Stop Event Times     Date Time Event    12/16/2021 1615 Anesthesia Start     1705 Anesthesia Stop        Responsible Staff  12/16/21    Name Role Begin End    Emile Ureña M.D. Anesth 1615 1705        Preop Diagnosis (Free Text):  Pre-op Diagnosis     9 mm proximal left ureteral stone        Preop Diagnosis (Codes):    Premium Reason  K. Alert    Comments:

## 2021-12-17 NOTE — OR NURSING
1702: To PACU from OR. 6lpm oxygen via mask.    Drowsy but arousable  Respirations spontaneous and unlabored. Suprapubic cath draining bloody urine. Colostomy intact. Pt denies pain and nausea.  1715: tolerating sips of water. Denies pain. On room air  1730: awake alert. Resting. No complains voiced.  1734: meets criteria to transfer back to room. Handoff report to Corinne, RN   1745: no change. Suprapubic cath draining   1756: Discharge from PACU in stable condition. room air. Transport via bed.

## 2021-12-17 NOTE — ANESTHESIA PREPROCEDURE EVALUATION
Case: [id number] Date/Time: 12/16/21 1445    Procedure: CYSTOSCOPY, WITH URETERAL STENT INSERTION (Left )    Location:  OR  / SURGERY [facility]    Surgeons: Aly Bowen M.D.        Brief Past Medical History    Anesthesia: No Problems with Anesthesia. Prior Glidescope/grade 3 DL noted.   Cards: No History of CHF, CAD, or Stents. > 4 METS. Hx paroxysmal a-fib  EKG: SB, RBBB  Resp: No Asthma or COPD  GI: No Daily Symptomatic GERD. NPO per guidelines.  Neuro: No History of CVA , TIA, or Seizures. Paraplegia from T4 down per patient.   Endo: No History of Diabetes  Renal: No active problems  MSK: No active problems    Relevant Problems   CARDIAC   (positive) Essential hypertension   (positive) Paroxysmal atrial flutter (HCC)      Other   (positive) Chronic incomplete quadriplegia (HCC)   (positive) Neurogenic bladder       Physical Exam    Airway   Mallampati: II  TM distance: <3 FB  Neck ROM: limited       Cardiovascular - normal exam  Rhythm: regular  Rate: normal  (-) murmur     Dental - normal exam           Pulmonary - normal exam  Breath sounds clear to auscultation     Abdominal    Neurological - normal exam                 Anesthesia Plan    ASA 3 (Hx of paraplegia with paroxysmal a-fib)- EMERGENT (Hydonephrosis)   ASA physical status 3 criteria: other (comment)ASA physical status emergent criteria: compromised vital organ, limb or tissue and other (comment)    Plan - general       Airway plan will be LMA          Induction: intravenous    Postoperative Plan: Postoperative administration of opioids is intended.    Pertinent diagnostic labs and testing reviewed    Informed Consent:    Anesthetic plan and risks discussed with patient.    Use of blood products discussed with: patient whom consented to blood products.

## 2021-12-17 NOTE — DISCHARGE PLANNING
Anticipated Discharge Disposition: Home to [facility]    Action: LSW completed chart review. Per chart review, pt resides at [facility]. Pt was previously on service with [facility]. Pt will need [facility] transport home.    Order for HH placed by MD.    Barriers to Discharge: HH choice    Plan: LSW to collect HH choice from pt, RN CM to assist with transport when pt is clear to d/c home.    1205: LSW attempted to meet with pt at bedside to collect HH choice. Pt currently undergoing procedure. LSW to re-attempt at a later time.    1330: LSW met with pt at bedside to collect HH choice. Pt confirmed he is from [facility]. Pt would like to resume HH services with [facility].     LSW faxed HH choice to [facility] with verbal consent from pt.       Care Transition Team Assessment  From 10/25/2021     Information Source  Orientation Level: Oriented X4  Information Given By: Patient  Who is responsible for making decisions for patient? : Patient     Readmission Evaluation  Is this a readmission?: No     Elopement Risk  Legal Hold: No  Ambulatory or Self Mobile in Wheelchair: No-Not an Elopement Risk  Elopement Risk: Not at Risk for Elopement     Interdisciplinary Discharge Planning  Primary Care Physician: Dr. Santana and Dr. Harris  Lives with - Patient's Self Care Capacity: Attendant / Paid Care Giver  Patient or legal guardian wants to designate a caregiver: No  Housing / Facility: [facility]  Name of Care Facility: [facility]  Prior Services: Skilled Home Health Services (primarily skilled RN for wound care, ostomy, and cath care)     Discharge Preparedness  What is your plan after discharge?: Home health care  What are your discharge supports?: Child           Finances  Financial Barriers to Discharge: No     Vision / Hearing Impairment  Right Eye Vision: Impaired, Wears Glasses  Left Eye Vision: Impaired, Wears Glasses           Advance Directive  Advance Directive?: DPOA for Health Care     Domestic Abuse  Have you  ever been the victim of abuse or violence?: No  Physical Abuse or Sexual Abuse: No  Verbal Abuse or Emotional Abuse: No  Possible Abuse/Neglect Reported to:: Not Applicable     Psychological Assessment  History of Substance Abuse: None     Discharge Risks or Barriers  Discharge risks or barriers?: No     Anticipated Discharge Information  Discharge Disposition: Discharged to home/self care (01)           2

## 2021-12-17 NOTE — ANESTHESIA PROCEDURE NOTES
Airway    Date/Time: 12/16/2021 4:21 PM  Performed by: Emile Ureña M.D.  Authorized by: Emile Ureña M.D.     Location:  OR  Urgency:  Elective  Indications for Airway Management:  Anesthesia      Spontaneous Ventilation: absent    Sedation Level:  Deep  Preoxygenated: Yes    Final Airway Type:  Supraglottic airway  Final Supraglottic Airway:  Standard LMA    SGA Size:  4  Number of Attempts at Approach:  1

## 2021-12-17 NOTE — PROGRESS NOTES
Received report from Corinne.  Patient is not expressing any needs at the moment. Safety precautions in place. Assumed care of patient.

## 2021-12-17 NOTE — OP REPORT
DATE OF SERVICE:  12/16/2021     PREOPERATIVE DIAGNOSIS:  A 9 mm obstructing left ureteral calculus.     POSTOPERATIVE DIAGNOSIS:  A 9 mm obstructing left ureteral calculus.     PROCEDURES:  1.  Left ureteroscopy, attempted laser lithotripsy.  2.  Cystoscopy, indwelling stent placement.     DESCRIPTION OF PROCEDURE:  The patient was brought to the operating room, was   given general anesthetic, placed in lithotomy position, prepped and draped in   sterile fashion.  Fluoroscopic images showed the stone over the expected   course of the proximal left ureter.  The suprapubic tube was clamped.  A   cystoscopy was performed after sterile prepping and draping.  Normal anterior   and prostatic urethra.  Bladder showed suprapubic tube to be in place.  Left   orifice was identified.  Wire was passed through the orifice up to the kidney.    There was no release of purulent material alongside the wire.  I then took   the rigid ureteroscope. It was able to pass in the distal ureter proximal   enough to the stone.  I felt given that it has been stable and we did not have   a release of pus, we could make at least a brief attempt at lithotripsy, so I   passed the obturator sheath first with the inner obturator, then the complete   sheath to a level just below the stone.  Flexible scope was then passed up   and easily achieved the stone.  It was a black stone.  There was some moderate   amount of ureteral edema around the stone, suggesting it had been there for   some time.  It was very dark, suggesting it is a hard calcium oxalate   monohydrate stone.  We began with laser lithotripsy at 400 mJ at 20 Hz.  There   was some partial fragmentation of the stone and then floated proximally up   into the kidney.  ___ in the kidney realized now it is in the lower pole.    There was going to be some difficulty then moving it in different locations   and the ureter and the kidney itself did now appear to be more purulent.    Therefore, I  felt it was safer to stop at this point, place a stent and return   at a later date for lithotripsy.  Contrast used to opacify the collecting   system.  Wire was then passed up to the kidney.  Ureteroscope was removed.    Cystoscope was then passed over the wire.  A 26 cm 6-Estonian double-pigtail   stent was placed, coiling in the renal pelvis and distally in the bladder.    While we were up in the kidney, we did aspirate some of the urine from the   renal pelvis to be sent for culture.  He is sent to recovery room in stable   condition.     FINAL DIAGNOSES:  Status post:  1.  Left ureteroscopy and laser lithotripsy.  2.  Cystoscopy, indwelling stent placement.        ______________________________  MD EDUARDO Lott/JAYSON/AARTI    DD:  12/16/2021 17:00  DT:  12/16/2021 17:54    Job#:  [id number]

## 2021-12-17 NOTE — PROGRESS NOTES
[facility] Medicine Daily Progress Note    Date of Service  12/17/2021    Chief Complaint  Osvaldo Pate is a 71 y.o. male admitted 12/15/2021 after CT showed left hydronephrosis.    Hospital Course  Osvaldo Pate is a 71 y.o. male who presented 12/15/2021 with C7 injury and subsequent paraplegia, decubitus ulcers, and recurrent UTIs who presents to the ED with abnormal imaging.  The patient was apparently well until the day prior to admission when he had a CT scan done to try and find a reason for frequent UTI's.  The CT revealed a 9 mm ureteral stone on the left side with associated hydronephrosis.  Due to the patient's sensory deficits at baseline, he is unable to identify any suprapubic pain or abdominal pain.  He does not have any associated chest pains, palpitations, confusion, or fever/chills.  Due to his complicated history of recurrent UTIs with MRSA and Pseudomonas he was advised by his urologist to seek consult at her institution for stone removal.  Urology consulted and planning for L CULTS on this admission.      Interval Problem Update  12/16 Patient states he feels fine, no symptoms including fever or chills.  He has a C7 paraplegia.  Urine culture from 12/9 showed MRSA and pseudomonas so he is empirically on vanco/zosyn currently until new cultures result.  He has CULTS today with Dr Bowen at 1500.  12/17 Patient without complaint, had cystoscopy and stenting placed but unable to complete lithotripsy - will be revisited in 2 weeks.  Urine culture showing pseudomonas at this time, continue same antibiotics until sensitivities are resulted.  He will follow with Dr Banks for his sacral wound as outpatient also.    I have personally seen and examined the patient at bedside. I discussed the plan of care with patient, bedside RN, charge RN,  and pharmacy.    Consultants/Specialty  urology    Code Status  Full Code    Disposition  Patient is not medically cleared.    Anticipate discharge to to home with organized home healthcare and close outpatient follow-up.  I have placed the appropriate orders for post-discharge needs.    Review of Systems  Review of Systems   Constitutional: Negative for chills and fever.   HENT: Negative for congestion and sore throat.    Eyes: Negative for blurred vision and photophobia.   Respiratory: Negative for cough and shortness of breath.    Cardiovascular: Negative for chest pain, claudication and leg swelling.   Gastrointestinal: Negative for abdominal pain, constipation, diarrhea, heartburn and vomiting.   Genitourinary: Negative for dysuria and hematuria.   Musculoskeletal: Negative for joint pain and myalgias.   Skin: Negative for itching and rash.   Neurological: Negative for dizziness, sensory change, speech change, weakness and headaches.   Psychiatric/Behavioral: Negative for depression. The patient is not nervous/anxious and does not have insomnia.         Physical Exam  Temp:  [36.3 °C (97.3 °F)-37 °C (98.6 °F)] 36.3 °C (97.3 °F)  Pulse:  [41-53] 41  Resp:  [14-16] 16  BP: (104-180)/(51-77) 156/58  SpO2:  [93 %-100 %] 95 %    Physical Exam  Vitals and nursing note reviewed.   Constitutional:       General: He is not in acute distress.     Appearance: Normal appearance.   HENT:      Head: Normocephalic and atraumatic.   Eyes:      General: No scleral icterus.     Extraocular Movements: Extraocular movements intact.   Cardiovascular:      Rate and Rhythm: Normal rate and regular rhythm.      Pulses: Normal pulses.      Heart sounds: Normal heart sounds. No murmur heard.      Pulmonary:      Effort: Pulmonary effort is normal. No respiratory distress.      Breath sounds: Normal breath sounds. No wheezing, rhonchi or rales.   Abdominal:      General: Abdomen is flat. Bowel sounds are normal. There is no distension.      Palpations: Abdomen is soft.      Tenderness: There is no rebound.      Comments: Ostomy in place     Musculoskeletal:          General: No swelling or tenderness.      Cervical back: Normal range of motion and neck supple.   Lymphadenopathy:      Cervical: No cervical adenopathy.   Skin:     Coloration: Skin is not jaundiced.      Findings: No erythema.   Neurological:      Mental Status: He is alert and oriented to person, place, and time. Mental status is at baseline.      Cranial Nerves: No cranial nerve deficit.      Comments: Paraplegia     Psychiatric:         Mood and Affect: Mood normal.         Behavior: Behavior normal.         Fluids    Intake/Output Summary (Last 24 hours) at 12/17/2021 1219  Last data filed at 12/16/2021 1653  Gross per 24 hour   Intake 300 ml   Output --   Net 300 ml       Laboratory  Recent Labs     12/15/21  1230 12/16/21  0555 12/17/21  0232   WBC 6.2 5.7 5.8   RBC 3.84* 3.76* 3.66*   HEMOGLOBIN 13.0* 12.8* 12.4*   HEMATOCRIT 40.3* 39.5* 38.6*   .9* 105.1* 105.5*   MCH 33.9* 34.0* 33.9*   MCHC 32.3* 32.4* 32.1*   RDW 51.1* 51.9* 51.5*   PLATELETCT 148* 142* 145*   MPV 8.8* 9.4 9.4     Recent Labs     12/15/21  1140 12/16/21  0555 12/17/21  0232   SODIUM 141 141 143   POTASSIUM 4.6 4.2 4.2   CHLORIDE 109 111 111   CO2 20 21 21   GLUCOSE 90 92 116*   BUN 14 16 12   CREATININE 0.70 0.60 0.62   CALCIUM 8.9 8.9 8.6     Recent Labs     12/15/21  1140   APTT 32.6   INR 1.16*               Imaging  DX-PORTABLE FLUOROSCOPY < 1 HOUR   Final Result      Portable fluoroscopy utilized for 27.1 seconds.         INTERPRETING LOCATION: [address] [place] [address], [id number]      DX-CHEST-PORTABLE (1 VIEW)   Final Result      No radiographic evidence of acute cardiopulmonary process.           Assessment/Plan  * Obstructive uropathy- (present on admission)  Assessment & Plan  The patient presents as a admitted from his urologist office.  CT scan was done at that time and showed 9 mm obstructive stone in the left renal system.  Their recommendations were for stone removal tomorrow morning.  Patient has a history of  frequent urinary tract infections with previous cultures growing Pseudomonas and MRSA.  At this time, patient denies any fevers or chills.  Unable to identify any suprapubic pain because of history of sensory deficit from C7 injury.  He has not noticed any change in the quality or quantity of his urine into the Cazares bag.  Due to his complicated medical history, urology with recommendations for antibiotics based on previous cultures and stone removal in the morning.  Patient without acute renal failure  -Urology consulted, taken to OR, unable to complete lithotripsy but stent placed and ureter decompressed.  Further attempt at lithotripsy in 2 weeks  --Urine culture shows pseudomonas, will continue current antibiotics until cutlure at 48 hours.    History of MRSA and pseudomonas on culture 12/9, CT was done day of admission to see if source of recurrent infection could be found, patient given instruction to present to the ED for antibiotics, admission and surgical intervention with urology.      Frequent UTI  Assessment & Plan  Patient states that he has recently been started on prophylactic antibiotics with nitrofurantoin and Levaquin since the first week of December  Concerns for developing sepsis in the setting of obstructive uropathy.  Urology with recommendations for empiric antibiotic therapy.  Urine culture showing pseudomonas which should be sensitive to zosyn.  Patient has seen [facility] in the past      Bradycardia- (present on admission)  Assessment & Plan  No heart block identified on EKG  Medications reviewed  Continue to monitor    Neurogenic bladder- (present on admission)  Assessment & Plan  Patient with suprapubic catheter which is working well  Pending final urology recommendations    Chronic incomplete quadriplegia (HCC)- (present on admission)  Assessment & Plan  Pending PT/OT  Frequent turning and wound care    Essential hypertension- (present on admission)  Assessment & Plan  Currently well  controlled  Continue home dose of amlodipine and losartan    Paroxysmal atrial flutter (HCC)- (present on admission)  Assessment & Plan  History of  Currently in sinus bradycardia  We will need to continue outpatient follow-up    Pressure injury of sacral region, 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  Patient to follow with Dr Banks as outpatient, was supposed to see today.  Not appropriate for intervention on this hospitalization given drug resistant UTI present.         VTE prophylaxis: heparin ppx    I have performed a physical exam and reviewed and updated ROS and Plan today (12/17/2021). In review of yesterday's note (12/16/2021), there are no changes except as documented above.

## 2021-12-17 NOTE — PROGRESS NOTES
Note to reader: this note follows the APSO format rather than the historical SOAP format. Assessment and plan located at the top of the note for ease of use.    Chief Complaint  71 y.o. year old male here with 9mm obstructing L ureteral stone and multidrug resistant UTI. He is now POD1 s/p L ureteral stent placement with Dr. Bowen 12/16/21.    Assessment/Plan  Interval History   Active Hospital Problems    Diagnosis    • Obstructive uropathy [N13.9]    • Frequent UTI [N39.0]    • Bradycardia [R00.1]    • Pressure injury of sacral region, stage 4 ([facility]) [L89.154]    • Paroxysmal atrial flutter ([facility]) [I48.92]    • Essential hypertension [I10]    • Chronic incomplete quadriplegia ([facility]) [G82.50]    • Neurogenic bladder [N31.9]      12/17 POD1. Pt seen and examined on rounds, lying in bed in NAD. Reports no fever, chills. WBC down to 5.8, Cr stable at 0.63, H/H stable. 1L urine output overnight, DELBERT. Pt notes that today the urine in his SPT tubing has become darker red, but denies flank pain, dysuria, stent irritation, noting that typically he cannot sense those feelings anyways 2/2 quadriplegia. Remains on IV abx.    Disposition  -L ureteral stent to remain, continue appropriate abx for 2 weeks  -After 2 weeks of abx, will plan on definitive stone treatment with [facility] as an outpatient  -[facility] [place] will contact patient to schedule follow up upon discharge  -Urology signing off, please call with questions         Review of Systems  Physical Exam   Review of Systems   Constitutional: Negative for chills and fever.   Respiratory: Negative for cough and shortness of breath.    Cardiovascular: Negative for chest pain and leg swelling.   Gastrointestinal: Negative for abdominal pain, nausea and vomiting.   Genitourinary: Positive for hematuria. Negative for dysuria, flank pain and urgency.   All other systems reviewed and are negative.    Vitals:    12/16/21 1802 12/16/21 1930 12/17/21 0600 12/17/21 0800   BP: (!) 168/69  104/51 (!) 180/76 156/58   Pulse: (!) 51 (!) 49 (!) 43 (!) 41   Resp:    16   Temp:  36.4 °C (97.6 °F) 36.3 °C (97.4 °F) 36.3 °C (97.3 °F)   TempSrc:  Oral Oral Oral   SpO2: 94% 96% 96% 95%   Weight:       Height:         Physical Exam  Vitals and nursing note reviewed.   Constitutional:       General: He is not in acute distress.     Appearance: Normal appearance.   HENT:      Head: Normocephalic and atraumatic.      Nose: Nose normal.      Mouth/Throat:      Mouth: Mucous membranes are moist.      Pharynx: Oropharynx is clear.   Eyes:      Extraocular Movements: Extraocular movements intact.      Conjunctiva/sclera: Conjunctivae normal.   Pulmonary:      Effort: Pulmonary effort is normal.   Abdominal:      General: There is no distension.      Palpations: Abdomen is soft.   Genitourinary:     Comments: SPT in place with dark cherry colored urine in tubing without clot  Musculoskeletal:      Cervical back: Normal range of motion and neck supple.      Comments: quadriplegic   Skin:     General: Skin is warm and dry.   Neurological:      Mental Status: He is oriented to person, place, and time.   Psychiatric:         Mood and Affect: Mood normal.         Behavior: Behavior normal.          Hematology Chemistry   Lab Results   Component Value Date/Time    WBC 5.8 12/17/2021 02:32 AM    HEMOGLOBIN 12.4 (L) 12/17/2021 02:32 AM    HEMATOCRIT 38.6 (L) 12/17/2021 02:32 AM    PLATELETCT 145 (L) 12/17/2021 02:32 AM     Lab Results   Component Value Date/Time    SODIUM 143 12/17/2021 02:32 AM    POTASSIUM 4.2 12/17/2021 02:32 AM    CHLORIDE 111 12/17/2021 02:32 AM    CO2 21 12/17/2021 02:32 AM    GLUCOSE 116 (H) 12/17/2021 02:32 AM    BUN 12 12/17/2021 02:32 AM    CREATININE 0.62 12/17/2021 02:32 AM         Labs not explicitly included in this progress note were reviewed by the author.   Radiology/imaging not explicitly included in this progress note was reviewed by the author.     Radiology images reviewed, Labs reviewed  and Medications reviewed                  Discussed with patient, family, and nurse

## 2021-12-17 NOTE — CARE PLAN
The patient is Stable - Low risk of patient condition declining or worsening    Shift Goals  Clinical Goals: Rest  Patient Goals: Rest    Progress made toward(s) clinical / shift goals:  Progressing     Patient is not progressing towards the following goals:N/A       Problem: Skin Integrity  Goal: Skin integrity is maintained or improved  Outcome: Progressing     Problem: Communication  Goal: The ability to communicate needs accurately and effectively will improve  Outcome: Progressing    Met with patient at bedside to discuss plan of care. Patient is alert and awake. Patient states that he has no needs at the moment. Will continue to monitor.

## 2021-12-18 ENCOUNTER — PHARMACY VISIT (OUTPATIENT)
Dept: PHARMACY | Facility: MEDICAL CENTER | Age: 71
End: 2021-12-18
Payer: COMMERCIAL

## 2021-12-18 VITALS
WEIGHT: 226.85 LBS | OXYGEN SATURATION: 95 % | SYSTOLIC BLOOD PRESSURE: 123 MMHG | BODY MASS INDEX: 32.48 KG/M2 | HEIGHT: 70 IN | RESPIRATION RATE: 18 BRPM | DIASTOLIC BLOOD PRESSURE: 65 MMHG | TEMPERATURE: 97.6 F | HEART RATE: 47 BPM

## 2021-12-18 LAB
ANION GAP SERPL CALC-SCNC: 12 MMOL/L (ref 7–16)
BACTERIA UR CULT: ABNORMAL
BACTERIA UR CULT: ABNORMAL
BUN SERPL-MCNC: 16 MG/DL (ref 8–22)
CALCIUM SERPL-MCNC: 8.8 MG/DL (ref 8.4–10.2)
CHLORIDE SERPL-SCNC: 110 MMOL/L (ref 96–112)
CO2 SERPL-SCNC: 20 MMOL/L (ref 20–33)
CREAT SERPL-MCNC: 0.71 MG/DL (ref 0.5–1.4)
ERYTHROCYTE [DISTWIDTH] IN BLOOD BY AUTOMATED COUNT: 53.8 FL (ref 35.9–50)
GLUCOSE SERPL-MCNC: 86 MG/DL (ref 65–99)
HCT VFR BLD AUTO: 36.5 % (ref 42–52)
HGB BLD-MCNC: 11.8 G/DL (ref 14–18)
MCH RBC QN AUTO: 34.4 PG (ref 27–33)
MCHC RBC AUTO-ENTMCNC: 32.3 G/DL (ref 33.7–35.3)
MCV RBC AUTO: 106.4 FL (ref 81.4–97.8)
PLATELET # BLD AUTO: 128 K/UL (ref 164–446)
PMV BLD AUTO: 9.8 FL (ref 9–12.9)
POTASSIUM SERPL-SCNC: 4 MMOL/L (ref 3.6–5.5)
RBC # BLD AUTO: 3.43 M/UL (ref 4.7–6.1)
SIGNIFICANT IND 70042: ABNORMAL
SITE SITE: ABNORMAL
SODIUM SERPL-SCNC: 142 MMOL/L (ref 135–145)
SOURCE SOURCE: ABNORMAL
VANCOMYCIN TROUGH SERPL-MCNC: 14.2 UG/ML (ref 10–20)
WBC # BLD AUTO: 5.7 K/UL (ref 4.8–10.8)

## 2021-12-18 PROCEDURE — 700105 HCHG RX REV CODE 258: Performed by: INTERNAL MEDICINE

## 2021-12-18 PROCEDURE — RXMED WILLOW AMBULATORY MEDICATION CHARGE: Performed by: INTERNAL MEDICINE

## 2021-12-18 PROCEDURE — A9270 NON-COVERED ITEM OR SERVICE: HCPCS | Performed by: INTERNAL MEDICINE

## 2021-12-18 PROCEDURE — 700102 HCHG RX REV CODE 250 W/ 637 OVERRIDE(OP): Performed by: INTERNAL MEDICINE

## 2021-12-18 PROCEDURE — 85027 COMPLETE CBC AUTOMATED: CPT

## 2021-12-18 PROCEDURE — 80202 ASSAY OF VANCOMYCIN: CPT

## 2021-12-18 PROCEDURE — 99239 HOSP IP/OBS DSCHRG MGMT >30: CPT | Performed by: INTERNAL MEDICINE

## 2021-12-18 PROCEDURE — 80048 BASIC METABOLIC PNL TOTAL CA: CPT

## 2021-12-18 PROCEDURE — 700111 HCHG RX REV CODE 636 W/ 250 OVERRIDE (IP): Performed by: INTERNAL MEDICINE

## 2021-12-18 RX ORDER — CIPROFLOXACIN 500 MG/1
500 TABLET, FILM COATED ORAL 2 TIMES DAILY
Qty: 28 TABLET | Refills: 1 | Status: SHIPPED | OUTPATIENT
Start: 2021-12-18 | End: 2022-01-01

## 2021-12-18 RX ORDER — CIPROFLOXACIN 500 MG/1
500 TABLET, FILM COATED ORAL 2 TIMES DAILY
Qty: 28 TABLET | Refills: 1 | Status: SHIPPED | OUTPATIENT
Start: 2021-12-18 | End: 2021-12-18 | Stop reason: SDUPTHER

## 2021-12-18 RX ADMIN — BACLOFEN 20 MG: 10 TABLET ORAL at 09:36

## 2021-12-18 RX ADMIN — DOCUSATE SODIUM 200 MG: 100 CAPSULE, LIQUID FILLED ORAL at 05:18

## 2021-12-18 RX ADMIN — PIPERACILLIN AND TAZOBACTAM 3.38 G: 3; .375 INJECTION, POWDER, LYOPHILIZED, FOR SOLUTION INTRAVENOUS; PARENTERAL at 05:19

## 2021-12-18 RX ADMIN — HEPARIN SODIUM 5000 UNITS: 5000 INJECTION, SOLUTION INTRAVENOUS; SUBCUTANEOUS at 05:19

## 2021-12-18 RX ADMIN — BACLOFEN 20 MG: 10 TABLET ORAL at 14:21

## 2021-12-18 RX ADMIN — SENNOSIDES AND DOCUSATE SODIUM 2 TABLET: 50; 8.6 TABLET ORAL at 05:19

## 2021-12-18 ASSESSMENT — PATIENT HEALTH QUESTIONNAIRE - PHQ9
1. LITTLE INTEREST OR PLEASURE IN DOING THINGS: NOT AT ALL
SUM OF ALL RESPONSES TO PHQ9 QUESTIONS 1 AND 2: 0
2. FEELING DOWN, DEPRESSED, IRRITABLE, OR HOPELESS: NOT AT ALL

## 2021-12-18 NOTE — DISCHARGE SUMMARY
Discharge Summary    CHIEF COMPLAINT ON ADMISSION  Chief Complaint   Patient presents with   • Nephrolithiasis       Reason for Admission  EMS     Admission Date  12/15/2021    CODE STATUS  Full Code    HPI & HOSPITAL COURSE  Osvaldo Pate is a 71 y.o. male who presented 12/15/2021 with C7 injury and subsequent paraplegia, decubitus ulcers, and recurrent UTIs who presents to the ED with abnormal imaging.  The patient was apparently well until the day prior to admission when he had a CT scan done to try and find a reason for frequent UTI's.  The CT revealed a 9 mm ureteral stone on the left side with associated hydronephrosis.  Due to the patient's sensory deficits at baseline, he is unable to identify any suprapubic pain or abdominal pain.  He does not have any associated chest pains, palpitations, confusion, or fever/chills.  Due to his complicated history of recurrent UTIs with MRSA and Pseudomonas he was advised by his urologist to seek consult at her institution for stone removal.  Urology consulted and took patient to the OR, Left ureteral stent was placed and he will have 2 weeks of antibiotic therapy and then lithotripsy will be done at that time.  Urine culture resulted in cipro sensitive pseudomonas infection.  He had his suprapubic catheter exchanged on this hospitalization.  His cipro will be delivered to bedside by the [facility] to beds program prior to his discharge home.       Therefore, he is discharged in good and stable condition to home with close outpatient follow-up.    The patient met 2-midnight criteria for an inpatient stay at the time of discharge.    Discharge Date  12/18/2021    FOLLOW UP ITEMS POST DISCHARGE  PCP - 2 weeks  Dr Bowen - 2 weeks for lithotripsy.    DISCHARGE DIAGNOSES  Principal Problem:    Obstructive uropathy POA: Yes  Active Problems:    Pressure injury of sacral region, stage 4 (HCC) POA: Yes    Paroxysmal atrial flutter (HCC) POA: Yes    Essential hypertension  POA: Yes    Chronic incomplete quadriplegia (HCC) POA: Yes    Neurogenic bladder POA: Yes    Bradycardia POA: Yes    Frequent UTI POA: Unknown  Resolved Problems:    * No resolved hospital problems. *      FOLLOW UP  Future Appointments   Date Time Provider Department Center   12/30/2021  3:00 PM GREGG [facility] [address]   1/18/2022  9:15 AM REM[facility]ITORING - [facility] None   2/22/2022 11:15 AM REMOTE MONITORING - [facility] None   3/23/2022 10:45 AM REMOTE MONITORING - [facility] None     KATE Pretty  [address] [address]  [phone]          Amadeo Moon M.D.  [address]  [facility] [address]  [phone]    In 2 weeks        MEDICATIONS ON DISCHARGE     Medication List      START taking these medications      Instructions   ciprofloxacin 500 MG Tabs  Commonly known as: CIPRO   Take 1 Tablet by mouth 2 times a day for 14 days.  Dose: 500 mg        CONTINUE taking these medications      Instructions   acetaminophen 500 MG Tabs  Commonly known as: TYLENOL   Take 1,000 mg by mouth 2 times a day. Takes 1000 mg twice daily then 500 mg every 4 hours as needed (3grams per 24 hours)  Dose: 1,000 mg     amLODIPine 10 MG Tabs  Commonly known as: NORVASC   Take 10 mg by mouth every day.  Dose: 10 mg     baclofen 20 MG tablet  Commonly known as: LIORESAL   Take 20 mg by mouth 4 times a day.  Dose: 20 mg     docusate sodium 100 MG Caps  Commonly known as: COLACE   Take 200 mg by mouth 2 times a day.  Dose: 200 mg     ferrous sulfate 325 (65 Fe) MG tablet   Take 325 mg by mouth 2 Times a Day.  Dose: 325 mg     losartan 50 MG Tabs  Commonly known as: COZAAR   Take 50 mg by mouth every day.  Dose: 50 mg     magnesium oxide 400 MG Tabs tablet  Commonly known as: MAG-OX   Take 400 mg by mouth every day.  Dose: 400 mg     melatonin 5 mg Tabs   Take 10 mg by mouth at bedtime.  Dose: 10 mg     Non Formulary Request   Take 2 Tablets by mouth 2 times a day. Mannose/Cranberry  "900mg/500mg  Dose: 2 Tablet     polyethylene glycol/lytes 17 g Pack  Commonly known as: MIRALAX   Take 17 g by mouth every day.  Dose: 17 g     PROBIOTIC PO   Take 1 Tab by mouth every day.  Dose: 1 Tablet     sennosides 8.6 MG Tabs  Commonly known as: SENOKOT   Take 17.2 mg by mouth 2 times a day.  Dose: 17.2 mg     therapeutic multivitamin-minerals Tabs   Take 1 Tab by mouth every day.  Dose: 1 Tablet     Vitamin C 1000 MG Tabs   Take 1,000 mg by mouth every day.  Dose: 1,000 mg     vitamin D 2000 UNIT Tabs   Take 2,000 Units by mouth every day.  Dose: 2,000 Units     Zinc Sulfate 50 MG Caps   Take 50 mg by mouth every day.  Dose: 50 mg        STOP taking these medications    levoFLOXacin 750 MG tablet  Commonly known as: Levaquin     nitrofurantoin 100 MG Caps  Commonly known as: MACROBID            Allergies  Allergies   Allergen Reactions   • Sulfa Drugs Rash     Rash, developed this back in 2015 after being placed on \"sulfa antibiotic for my wound\". Antibiotic was stopped and rash went away. Patient states he had a sulfa antibiotic prior to that time back when he was younger w/o a reaction.          DIET  Orders Placed This Encounter   Procedures   • Diet Order Diet: Regular     Standing Status:   Standing     Number of Occurrences:   1     Order Specific Question:   Diet:     Answer:   Regular [1]       ACTIVITY  As tolerated.  Weight bearing as tolerated    CONSULTATIONS  [facility] - urology    PROCEDURES  12/16 - [facility] - Left ureteroscopy with attempted laser lithotripsy, cystoscopy, indwelling stent placement.    LABORATORY  Lab Results   Component Value Date    SODIUM 142 12/18/2021    POTASSIUM 4.0 12/18/2021    CHLORIDE 110 12/18/2021    CO2 20 12/18/2021    GLUCOSE 86 12/18/2021    BUN 16 12/18/2021    CREATININE 0.71 12/18/2021        Lab Results   Component Value Date    WBC 5.7 12/18/2021    HEMOGLOBIN 11.8 (L) 12/18/2021    HEMATOCRIT 36.5 (L) 12/18/2021    PLATELETCT 128 (L) 12/18/2021    "     Total time of the discharge process exceeds 35 minutes.

## 2021-12-18 NOTE — DISCHARGE PLANNING
[facility] has been scheduled for this evening with a 1700  time. Treatment team aware. Patient and GH aware.     Plan: Patient to dc home at 1700 via [facility]

## 2021-12-18 NOTE — THERAPY
Occupational Therapy  Contact Note    Patient Name: Osvaldo Pate  Age:  71 y.o., Sex:  male  Medical Record #: [id number]  Today's Date: 12/18/2021    OT eval order received and acknowledged, chart reviewed. Met with pt at bedside, declined need for OT eval. He is an incomplete C5 quadreplegic familiar to this OT. Pt still lives in a group home, [facility], where he receives 24/7 continuous care and assistance with ADLs/IADLs, only needs setup with self feeding, and grooming/hygiene in bed, and is a dependent brigid lift transfer at baseline. Will DC OT eval order at this time.    Discussed missed therapy with RN.    Dionna Stafford, OT  [phone]

## 2021-12-18 NOTE — DISCHARGE INSTRUCTIONS
Discharge Instructions    Discharged to home by car with escort. Discharged via ambulance, hospital escort: Yes.  Special equipment needed: Not Applicable    Be sure to schedule a follow-up appointment with your primary care doctor or any specialists as instructed.     Discharge Plan:   Diet Plan: Discussed  Activity Level: Discussed  Confirmed Follow up Appointment: Patient to Call and Schedule Appointment  Confirmed Symptoms Management: Discussed  Medication Reconciliation Updated: Yes  Influenza Vaccine Indication: Patient Refuses    I understand that a diet low in cholesterol, fat, and sodium is recommended for good health. Unless I have been given specific instructions below for another diet, I accept this instruction as my diet prescription.   Other diet: regular    Special Instructions: None    · Is patient discharged on Warfarin / Coumadin?   No   Kidney Stones    Kidney stones (urolithiasis) are rock-like masses that form inside of the kidneys. Kidneys are organs that make pee (urine). A kidney stone can cause very bad pain and can block the flow of pee. The stone usually leaves your body (passes) through your pee. You may need to have a doctor take out the stone.  Follow these instructions at home:  Eating and drinking  · Drink enough fluid to keep your pee clear or pale yellow. This will help you pass the stone.  · If told by your doctor, change the foods you eat (your diet). This may include:  ? Limiting how much salt (sodium) you eat.  ? Eating more fruits and vegetables.  ? Limiting how much meat, poultry, fish, and eggs you eat.  · Follow instructions from your doctor about eating or drinking restrictions.  General instructions  · Collect pee samples as told by your doctor. You may need to collect a pee sample:  ? 24 hours after a stone comes out.  ? 8-12 weeks after a stone comes out, and every 6-12 months after that.  · Strain your pee every time you pee (urinate), for as long as told. Use the  strainer that your doctor recommends.  · Do not throw out the stone. Keep it so that it can be tested by your doctor.  · Take over-the-counter and prescription medicines only as told by your doctor.  · Keep all follow-up visits as told by your doctor. This is important. You may need follow-up tests.  Preventing kidney stones  To prevent another kidney stone:  · Drink enough fluid to keep your pee clear or pale yellow. This is the best way to prevent kidney stones.  · Eat healthy foods.  · Avoid certain foods as told by your doctor. You may be told to eat less protein.  · Stay at a healthy weight.  Contact a doctor if:  · You have pain that gets worse or does not get better with medicine.  Get help right away if:  · You have a fever or chills.  · You get very bad pain.  · You get new pain in your belly (abdomen).  · You pass out (faint).  · You cannot pee.  This information is not intended to replace advice given to you by your health care provider. Make sure you discuss any questions you have with your health care provider.  Document Released: 06/05/2009 Document Revised: 07/30/2019 Document Reviewed: 09/05/2017  [employer] Patient Education © 2020 [employer]      Depression / Suicide Risk    As you are discharged from this [facility] facility, it is important to learn how to keep safe from harming yourself.    Recognize the warning signs:  · Abrupt changes in personality, positive or negative- including increase in energy   · Giving away possessions  · Change in eating patterns- significant weight changes-  positive or negative  · Change in sleeping patterns- unable to sleep or sleeping all the time   · Unwillingness or inability to communicate  · Depression  · Unusual sadness, discouragement and loneliness  · Talk of wanting to die  · Neglect of personal appearance   · Rebelliousness- reckless behavior  · Withdrawal from people/activities they love  · Confusion- inability to concentrate     If you or a loved  one observes any of these behaviors or has concerns about self-harm, here's what you can do:  · Talk about it- your feelings and reasons for harming yourself  · Remove any means that you might use to hurt yourself (examples: pills, rope, extension cords, firearm)  · Get professional help from the community (Mental Health, Substance Abuse, psychological counseling)  · Do not be alone:Call your Safe Contact- someone whom you trust who will be there for you.  · Call your local CRISIS HOTLINE 652-5579 or 011-381-5140  · Call your local Children's Mobile Crisis Response Team [place] (362) 039-5504 or www.Metricly  · Call the toll free National Suicide Prevention Hotlines   · National Suicide Prevention Lifeline 410-072-ULBC (0771)  · National Hope Line Network 800-SUICIDE (877-7612)

## 2021-12-18 NOTE — CARE PLAN
The patient is Stable - Low risk of patient condition declining or worsening    Shift Goals  Clinical Goals: Rest  Patient Goals: Rest    Progress made toward(s) clinical / shift goals:  Progressing    Patient is not progressing towards the following goals:N/A      Problem: Skin Integrity  Goal: Skin integrity is maintained or improved  Outcome: Progressing     Problem: Knowledge Deficit - Standard  Goal: Patient and family/care givers will demonstrate understanding of plan of care, disease process/condition, diagnostic tests and medications  Outcome: Progressing     Problem: Communication  Goal: The ability to communicate needs accurately and effectively will improve  Outcome: Progressing     Met with patient at bedside to discuss plan of care. Patient is not expressing any needs at this time. Will continue to monitor.

## 2021-12-18 NOTE — PROGRESS NOTES
Received report from Corinne. Patient is on isolation precautions at this time. Patient is not expressing any needs at the moment. Safety precautions in place. Assumed care of patient.

## 2021-12-18 NOTE — DISCHARGE PLANNING
Received Transport Form @ [phone]  Spoke to Trinidad MONTES DE [facility]    Transport is scheduled for 12/18 @1700 going home.

## 2021-12-18 NOTE — PROGRESS NOTES
Received bedside report from RN Sophie, pt care assumed. Contact isolation precautions in place. VSS, pt assessment complete. Pt A&Ox4, no c/o pain at this time. POC discussed with pt and verbalizes no questions. Pt denies any additional needs at this time. Bed locked and in lowest position, bed alarm is on. Pt educated on fall risk and verbalized understanding, call light within reach, hourly rounding initiated.

## 2021-12-18 NOTE — DISCHARGE PLANNING
Anticipated Discharge Disposition: Back to  ([facility])     Action: Per MD, patient can dc this afternoon. Patient will require [facility] transport home. LSW called  ([phone]) and informed them of patient's anticipate return home this afternoon. Caregiver was agreeable. She reports that the pharmacy they use is [facility] ([phone]). LSW inquired about another pharmacy option as this one is closed on the weekends however herself and an additional worker at the home she spoke with confirmed that is the only pharmacy they use.     LSW updated MD via voalte.     Transfer paperwork completed and waiting to be sent to [facility] once it's confirm pt can go.     Barriers to Discharge: medical clearance and 's pharmacy being closed on the weekends    Plan: Follow up with treatment team     Addendum 1315: Per RN, MD is ordering meds to beds and then pt can dc. LSW faxed transport forms to [facility] for processing. Requesting a 1600  time.  aware.

## 2021-12-19 NOTE — PROGRESS NOTES
Discharged. Patient verbalized understanding of discharge instructions importance of follow up care, when to seek immediate medical attention, and administration of prescribed medication. Prescribed medications delivered t bedside and sent home with patient. All IV's removed prior to discharge. New suprapubic cath inserted prior to discharge and urine bag and tubing changed prior to discharge. Patient was transported home via [facility] via [facility]. Patient was transported off unit under no apparent distress and all belongs sent  With patient.

## 2021-12-20 ENCOUNTER — TELEPHONE (OUTPATIENT)
Dept: HOSPITALIST | Facility: MEDICAL CENTER | Age: 71
End: 2021-12-20

## 2021-12-20 DIAGNOSIS — L89.154 PRESSURE INJURY OF SACRAL REGION, STAGE 4 (HCC): ICD-10-CM

## 2021-12-20 DIAGNOSIS — G82.50 CHRONIC INCOMPLETE QUADRIPLEGIA (HCC): ICD-10-CM

## 2021-12-20 LAB
BACTERIA UR CULT: ABNORMAL
BACTERIA UR CULT: ABNORMAL
GRAM STN SPEC: ABNORMAL
SIGNIFICANT IND 70042: ABNORMAL
SITE SITE: ABNORMAL
SOURCE SOURCE: ABNORMAL

## 2021-12-21 LAB
BACTERIA SPEC ANAEROBE CULT: ABNORMAL
BACTERIA SPEC ANAEROBE CULT: ABNORMAL
SIGNIFICANT IND 70042: ABNORMAL
SITE SITE: ABNORMAL
SOURCE SOURCE: ABNORMAL

## 2021-12-30 ENCOUNTER — HOSPITAL ENCOUNTER (OUTPATIENT)
Facility: MEDICAL CENTER | Age: 71
End: 2021-12-30
Attending: PHYSICIAN ASSISTANT
Payer: MEDICARE

## 2021-12-30 PROCEDURE — 87086 URINE CULTURE/COLONY COUNT: CPT

## 2022-01-02 LAB
BACTERIA UR CULT: NORMAL
SIGNIFICANT IND 70042: NORMAL
SITE SITE: NORMAL
SOURCE SOURCE: NORMAL

## 2022-01-04 ENCOUNTER — ANESTHESIA (OUTPATIENT)
Dept: SURGERY | Facility: MEDICAL CENTER | Age: 72
End: 2022-01-04
Payer: MEDICARE

## 2022-01-04 ENCOUNTER — HOSPITAL ENCOUNTER (OUTPATIENT)
Facility: MEDICAL CENTER | Age: 72
End: 2022-01-04
Attending: UROLOGY | Admitting: UROLOGY
Payer: MEDICARE

## 2022-01-04 ENCOUNTER — APPOINTMENT (OUTPATIENT)
Dept: RADIOLOGY | Facility: MEDICAL CENTER | Age: 72
End: 2022-01-04
Attending: UROLOGY
Payer: MEDICARE

## 2022-01-04 ENCOUNTER — ANESTHESIA EVENT (OUTPATIENT)
Dept: SURGERY | Facility: MEDICAL CENTER | Age: 72
End: 2022-01-04
Payer: MEDICARE

## 2022-01-04 VITALS
SYSTOLIC BLOOD PRESSURE: 150 MMHG | HEIGHT: 70 IN | BODY MASS INDEX: 32.76 KG/M2 | RESPIRATION RATE: 12 BRPM | OXYGEN SATURATION: 93 % | TEMPERATURE: 97.7 F | DIASTOLIC BLOOD PRESSURE: 71 MMHG | WEIGHT: 228.84 LBS | HEART RATE: 65 BPM

## 2022-01-04 DIAGNOSIS — N20.0 NEPHROLITHIASIS: Primary | ICD-10-CM

## 2022-01-04 LAB
EXTERNAL QUALITY CONTROL: NORMAL
SARS-COV+SARS-COV-2 AG RESP QL IA.RAPID: NEGATIVE

## 2022-01-04 PROCEDURE — 501329 HCHG SET, CYSTO IRRIG Y TUR: Performed by: UROLOGY

## 2022-01-04 PROCEDURE — 160028 HCHG SURGERY MINUTES - 1ST 30 MINS LEVEL 3: Performed by: UROLOGY

## 2022-01-04 PROCEDURE — 700111 HCHG RX REV CODE 636 W/ 250 OVERRIDE (IP): Performed by: ANESTHESIOLOGY

## 2022-01-04 PROCEDURE — 700101 HCHG RX REV CODE 250: Performed by: ANESTHESIOLOGY

## 2022-01-04 PROCEDURE — 700105 HCHG RX REV CODE 258: Performed by: UROLOGY

## 2022-01-04 PROCEDURE — 87426 SARSCOV CORONAVIRUS AG IA: CPT | Performed by: UROLOGY

## 2022-01-04 PROCEDURE — C1769 GUIDE WIRE: HCPCS | Performed by: UROLOGY

## 2022-01-04 PROCEDURE — 160009 HCHG ANES TIME/MIN: Performed by: UROLOGY

## 2022-01-04 PROCEDURE — C2617 STENT, NON-COR, TEM W/O DEL: HCPCS | Performed by: UROLOGY

## 2022-01-04 PROCEDURE — 88300 SURGICAL PATH GROSS: CPT

## 2022-01-04 PROCEDURE — 160035 HCHG PACU - 1ST 60 MINS PHASE I: Performed by: UROLOGY

## 2022-01-04 PROCEDURE — 160002 HCHG RECOVERY MINUTES (STAT): Performed by: UROLOGY

## 2022-01-04 PROCEDURE — 160046 HCHG PACU - 1ST 60 MINS PHASE II: Performed by: UROLOGY

## 2022-01-04 PROCEDURE — 500062 HCHG BASKET: Performed by: UROLOGY

## 2022-01-04 PROCEDURE — 160025 RECOVERY II MINUTES (STATS): Performed by: UROLOGY

## 2022-01-04 PROCEDURE — 160039 HCHG SURGERY MINUTES - EA ADDL 1 MIN LEVEL 3: Performed by: UROLOGY

## 2022-01-04 PROCEDURE — 82365 CALCULUS SPECTROSCOPY: CPT

## 2022-01-04 PROCEDURE — 160048 HCHG OR STATISTICAL LEVEL 1-5: Performed by: UROLOGY

## 2022-01-04 PROCEDURE — C1894 INTRO/SHEATH, NON-LASER: HCPCS | Performed by: UROLOGY

## 2022-01-04 PROCEDURE — C1758 CATHETER, URETERAL: HCPCS | Performed by: UROLOGY

## 2022-01-04 DEVICE — STENT UROLOGICAL POLARIS 6X24  ULTRA: Type: IMPLANTABLE DEVICE | Site: URETHRA | Status: FUNCTIONAL

## 2022-01-04 RX ORDER — ONDANSETRON 2 MG/ML
4 INJECTION INTRAMUSCULAR; INTRAVENOUS
Status: DISCONTINUED | OUTPATIENT
Start: 2022-01-04 | End: 2022-01-04 | Stop reason: HOSPADM

## 2022-01-04 RX ORDER — GLYCOPYRROLATE 0.2 MG/ML
INJECTION INTRAMUSCULAR; INTRAVENOUS PRN
Status: DISCONTINUED | OUTPATIENT
Start: 2022-01-04 | End: 2022-01-04 | Stop reason: SURG

## 2022-01-04 RX ORDER — DIPHENHYDRAMINE HYDROCHLORIDE 50 MG/ML
12.5 INJECTION INTRAMUSCULAR; INTRAVENOUS
Status: DISCONTINUED | OUTPATIENT
Start: 2022-01-04 | End: 2022-01-04 | Stop reason: HOSPADM

## 2022-01-04 RX ORDER — PHENAZOPYRIDINE HYDROCHLORIDE 200 MG/1
200 TABLET, FILM COATED ORAL 3 TIMES DAILY PRN
Qty: 15 TABLET | Refills: 0 | Status: SHIPPED | OUTPATIENT
Start: 2022-01-04 | End: 2022-01-21

## 2022-01-04 RX ORDER — HYDROMORPHONE HYDROCHLORIDE 1 MG/ML
0.2 INJECTION, SOLUTION INTRAMUSCULAR; INTRAVENOUS; SUBCUTANEOUS
Status: DISCONTINUED | OUTPATIENT
Start: 2022-01-04 | End: 2022-01-04 | Stop reason: HOSPADM

## 2022-01-04 RX ORDER — CEFAZOLIN SODIUM 1 G/3ML
INJECTION, POWDER, FOR SOLUTION INTRAMUSCULAR; INTRAVENOUS PRN
Status: DISCONTINUED | OUTPATIENT
Start: 2022-01-04 | End: 2022-01-04 | Stop reason: SURG

## 2022-01-04 RX ORDER — SODIUM CHLORIDE, SODIUM LACTATE, POTASSIUM CHLORIDE, CALCIUM CHLORIDE 600; 310; 30; 20 MG/100ML; MG/100ML; MG/100ML; MG/100ML
INJECTION, SOLUTION INTRAVENOUS CONTINUOUS
Status: DISCONTINUED | OUTPATIENT
Start: 2022-01-04 | End: 2022-01-04 | Stop reason: HOSPADM

## 2022-01-04 RX ORDER — HYDRALAZINE HYDROCHLORIDE 20 MG/ML
INJECTION INTRAMUSCULAR; INTRAVENOUS PRN
Status: DISCONTINUED | OUTPATIENT
Start: 2022-01-04 | End: 2022-01-04 | Stop reason: SURG

## 2022-01-04 RX ORDER — HYDROMORPHONE HYDROCHLORIDE 1 MG/ML
0.1 INJECTION, SOLUTION INTRAMUSCULAR; INTRAVENOUS; SUBCUTANEOUS
Status: DISCONTINUED | OUTPATIENT
Start: 2022-01-04 | End: 2022-01-04 | Stop reason: HOSPADM

## 2022-01-04 RX ORDER — OXYCODONE HCL 5 MG/5 ML
5 SOLUTION, ORAL ORAL
Status: DISCONTINUED | OUTPATIENT
Start: 2022-01-04 | End: 2022-01-04 | Stop reason: HOSPADM

## 2022-01-04 RX ORDER — HYDROMORPHONE HYDROCHLORIDE 1 MG/ML
0.4 INJECTION, SOLUTION INTRAMUSCULAR; INTRAVENOUS; SUBCUTANEOUS
Status: DISCONTINUED | OUTPATIENT
Start: 2022-01-04 | End: 2022-01-04 | Stop reason: HOSPADM

## 2022-01-04 RX ORDER — LIDOCAINE HYDROCHLORIDE 20 MG/ML
INJECTION, SOLUTION EPIDURAL; INFILTRATION; INTRACAUDAL; PERINEURAL PRN
Status: DISCONTINUED | OUTPATIENT
Start: 2022-01-04 | End: 2022-01-04 | Stop reason: SURG

## 2022-01-04 RX ORDER — HYDRALAZINE HYDROCHLORIDE 20 MG/ML
5 INJECTION INTRAMUSCULAR; INTRAVENOUS
Status: DISCONTINUED | OUTPATIENT
Start: 2022-01-04 | End: 2022-01-04 | Stop reason: HOSPADM

## 2022-01-04 RX ORDER — CIPROFLOXACIN 500 MG/1
500 TABLET, FILM COATED ORAL 2 TIMES DAILY
Status: ON HOLD | COMMUNITY
End: 2022-01-31

## 2022-01-04 RX ORDER — DEXAMETHASONE SODIUM PHOSPHATE 4 MG/ML
INJECTION, SOLUTION INTRA-ARTICULAR; INTRALESIONAL; INTRAMUSCULAR; INTRAVENOUS; SOFT TISSUE PRN
Status: DISCONTINUED | OUTPATIENT
Start: 2022-01-04 | End: 2022-01-04 | Stop reason: SURG

## 2022-01-04 RX ORDER — MIDAZOLAM HYDROCHLORIDE 1 MG/ML
INJECTION INTRAMUSCULAR; INTRAVENOUS PRN
Status: DISCONTINUED | OUTPATIENT
Start: 2022-01-04 | End: 2022-01-04 | Stop reason: SURG

## 2022-01-04 RX ORDER — OXYCODONE HCL 5 MG/5 ML
10 SOLUTION, ORAL ORAL
Status: DISCONTINUED | OUTPATIENT
Start: 2022-01-04 | End: 2022-01-04 | Stop reason: HOSPADM

## 2022-01-04 RX ORDER — HALOPERIDOL 5 MG/ML
1 INJECTION INTRAMUSCULAR
Status: DISCONTINUED | OUTPATIENT
Start: 2022-01-04 | End: 2022-01-04 | Stop reason: HOSPADM

## 2022-01-04 RX ORDER — PHENAZOPYRIDINE HYDROCHLORIDE 100 MG/1
200 TABLET, FILM COATED ORAL
Status: DISCONTINUED | OUTPATIENT
Start: 2022-01-04 | End: 2022-01-04 | Stop reason: HOSPADM

## 2022-01-04 RX ADMIN — SODIUM CHLORIDE, POTASSIUM CHLORIDE, SODIUM LACTATE AND CALCIUM CHLORIDE: 600; 310; 30; 20 INJECTION, SOLUTION INTRAVENOUS at 17:46

## 2022-01-04 RX ADMIN — MIDAZOLAM HYDROCHLORIDE 2 MG: 1 INJECTION, SOLUTION INTRAMUSCULAR; INTRAVENOUS at 18:06

## 2022-01-04 RX ADMIN — DEXAMETHASONE SODIUM PHOSPHATE 8 MG: 4 INJECTION, SOLUTION INTRA-ARTICULAR; INTRALESIONAL; INTRAMUSCULAR; INTRAVENOUS; SOFT TISSUE at 18:51

## 2022-01-04 RX ADMIN — LIDOCAINE HYDROCHLORIDE 50 MG: 20 INJECTION, SOLUTION EPIDURAL; INFILTRATION; INTRACAUDAL at 18:52

## 2022-01-04 RX ADMIN — HYDRALAZINE HYDROCHLORIDE 5 MG: 20 INJECTION INTRAMUSCULAR; INTRAVENOUS at 18:24

## 2022-01-04 RX ADMIN — GLYCOPYRROLATE 0.2 MG: 0.2 INJECTION INTRAMUSCULAR; INTRAVENOUS at 18:17

## 2022-01-04 RX ADMIN — PROPOFOL 20 MG: 10 INJECTION, EMULSION INTRAVENOUS at 18:29

## 2022-01-04 RX ADMIN — GLYCOPYRROLATE 0.2 MG: 0.2 INJECTION INTRAMUSCULAR; INTRAVENOUS at 18:07

## 2022-01-04 RX ADMIN — LIDOCAINE HYDROCHLORIDE 60 MG: 20 INJECTION, SOLUTION EPIDURAL; INFILTRATION; INTRACAUDAL at 18:09

## 2022-01-04 RX ADMIN — PROPOFOL 180 MG: 10 INJECTION, EMULSION INTRAVENOUS at 18:09

## 2022-01-04 RX ADMIN — CEFAZOLIN 2 G: 330 INJECTION, POWDER, FOR SOLUTION INTRAMUSCULAR; INTRAVENOUS at 18:17

## 2022-01-04 RX ADMIN — HYDRALAZINE HYDROCHLORIDE 5 MG: 20 INJECTION INTRAMUSCULAR; INTRAVENOUS at 18:28

## 2022-01-04 ASSESSMENT — PAIN DESCRIPTION - PAIN TYPE
TYPE: SURGICAL PAIN

## 2022-01-04 ASSESSMENT — FIBROSIS 4 INDEX: FIB4 SCORE: 2.68

## 2022-01-05 LAB — PATHOLOGY CONSULT NOTE: NORMAL

## 2022-01-05 NOTE — OR NURSING
Arrived to PACU in stable condition. Doing well. Denies pain or nausea and VSS.     Doing well and ready to go home. Report to Meghna.

## 2022-01-05 NOTE — OP REPORT
Urologic Surgery Operative Report     Pre-op Diagnosis: Left nephrolithiasis   Post-op Diagnosis: Same as above   Procedure: L ureteroscopy, laser lithotripsy, stone basketing, stent exchange   Surgeon: Surgeon(s) and Role:     * Osvaldo Baltazar M.D. - Primary   Assistant: Circulator: Ioana Souza R.N.  Laser Staff: Ryan Cisneros Circulator: Artie Valero R.N.  Scrub Person: Damon Rosales R.N.   Anesthesia: General,   Anesthesiologist: Eric Naqvi M.D.   Estimated Blood Loss: * No blood loss amount entered *   IV fluids <1L Crystalloid    Specimens: 1. Stone for chemical analysis    Complications: None   Condition: Stable, procedure well tolerated    Drains: 1. [id number] JJ stent((on strings) )  2. No hutchins   Disposition:  1. PACU, discharge after voiding  2. f/u 5-7days for stent removal   Findings 1. 0.8 cm stone at Left midpole        Indication:   Obstructing L ureteral stone now s/p L ureteral stent, here for laser lithotripsy.  After a full discussion of alternatives, risks, and benefits the patient consented to proceeding with Ureteroscopy, laser lithotripsy and possible JJ stent placement on the respective side.  He understands the risk of inability to access ureter, the need for second procedures, the possibility of negative ureteroscopy, that he may have stent discomfort until this is removed, bleeding, infection, ureteral injury or stricture, bladder injury, post op urinary retention requiring hutchins catheter, and the general pulmonary and cardiovascular risks associated with anesthesia.     Procedure Details:   The patient was taken to operating room and placed on table in supine position.  ancef was administered prior to the start of the procedure based on previous urine cultures. Sequential compression devices were placed for deep venous thrombosis prophylaxis. After induction of general anesthesia, both legs were placed in Rajesh stirrups in the standard lithotomy  position.  A timeout was held confirming the correct patient, procedure and laterality.   The perineal area was prepped and draped in a sterile fashion. A rigid cystoscope was inserted into the urethra and the bladder was emptied and then distended with sterile saline. Urethral sounds were not needed to dilate the urethral meatus. Cystoscopy revealed normal bladder mucosa, orthotopic ureteral orifices, and SP tube in good position.    We passed a wire next to the previously placed JJ stent on the respective side. We then grasped the stent and removed it under flouroscopic vision. A dual lumen was advanced and a second wire placed.      A [id number] ureteral access sheath was then placed over one of the  wires to desired position in left ureter, and wire was removed.  We inserted the flexible ureteroscope and identified the stone in the midpole  . The holmium laser was then used to fracture the stone until it was in 3-4 smaller fragments which were removed with a 0-tip basket.  We removed the ureteroscope slowly with the ureteral access sheath flushing out any remaining ureteral stone debris.     Returnign to rigid cystoscope, we then placed a L-sided JJ stent, [id number] JJ stent (on strings)  under fluoroscopy. We then saw that the JJ stent was in appropriate position, with a good curl visualized in the renal pelvis fluoroscopically and a good curl in the bladder endoscopically. Stones sent for analysis. The cystoscope was removed after emptying the bladder.  The patient was awoken from anesthesia and brought to recovery in satisfactory condition.        Osvaldo Baltazar M.D.   [address] [place] [address]   [phone]

## 2022-01-05 NOTE — ANESTHESIA PREPROCEDURE EVALUATION
Case: [id number] Date/Time: 01/04/22 2863    Procedures:       CYSTOSCOPY, WITH URETERAL STENT INSERTION (Left )      URETEROSCOPY      LITHOTRIPSY, USING LASER    Pre-op diagnosis: KIDNEY STONE    Location: [facility] OR 18 / SURGERY [facility]    Surgeons: Osvaldo Baltazar M.D.        c7 paraplegia/quadraplegia- can move hands and arms, slight weakness  Multiple anesthetics without problems  Relevant Problems   CARDIAC   (positive) Essential hypertension   (positive) Paroxysmal atrial flutter (HCC)       Physical Exam    Airway   Mallampati: II  TM distance: >3 FB  Neck ROM: limited       Cardiovascular - normal exam  Rhythm: regular  Rate: normal  (-) murmur     Dental - normal exam           Pulmonary - normal exam  Breath sounds clear to auscultation     Abdominal    Neurological - normal exam                 Anesthesia Plan    ASA 3   ASA physical status 3 criteria: other (comment)    Plan - general       Airway plan will be LMA    (C7 paraplegia)      Induction: intravenous    Postoperative Plan: Postoperative administration of opioids is intended.    Pertinent diagnostic labs and testing reviewed    Informed Consent:    Anesthetic plan and risks discussed with patient.    Use of blood products discussed with: patient whom consented to blood products.

## 2022-01-05 NOTE — ANESTHESIA PROCEDURE NOTES
Airway    Date/Time: 1/4/2022 6:10 PM  Performed by: Eric Naqvi M.D.  Authorized by: Eric Naqvi M.D.     Location:  OR  Urgency:  Elective  Indications for Airway Management:  Anesthesia      Spontaneous Ventilation: absent    Sedation Level:  Deep  Preoxygenated: Yes    Final Airway Type:  Supraglottic airway  Final Supraglottic Airway:  Standard LMA    SGA Size:  4  Number of Attempts at Approach:  1

## 2022-01-05 NOTE — DISCHARGE INSTRUCTIONS
ACTIVITY: Rest and take it easy for the first 24 hours.  A responsible adult is recommended to remain with you during that time.  It is normal to feel sleepy.  We encourage you to not do anything that requires balance, judgment or coordination.    MILD FLU-LIKE SYMPTOMS ARE NORMAL. YOU MAY EXPERIENCE GENERALIZED MUSCLE ACHES, THROAT IRRITATION, HEADACHE AND/OR SOME NAUSEA.    FOR 24 HOURS DO NOT:  Drive, operate machinery or run household appliances.  Drink beer or alcoholic beverages.   Make important decisions or sign legal documents.    SPECIAL INSTRUCTIONS: Per Dr Baltazar    DIET: To avoid nausea, slowly advance diet as tolerated, avoiding spicy or greasy foods for the first day.  Add more substantial food to your diet according to your physician's instructions.  Babies can be fed formula or breast milk as soon as they are hungry.  INCREASE FLUIDS AND FIBER TO AVOID CONSTIPATION.    SURGICAL DRESSING/BATHING: *no restrictions**    FOLLOW-UP APPOINTMENT:  A follow-up appointment should be arranged with your doctor; call to schedule.    You should CALL YOUR PHYSICIAN if you develop:  Fever greater than 101 degrees F.  Pain not relieved by medication, or persistent nausea or vomiting.  Excessive bleeding (blood soaking through dressing) or unexpected drainage from the wound.  Extreme redness or swelling around the incision site, drainage of pus or foul smelling drainage.  Inability to urinate or empty your bladder within 8 hours.  Problems with breathing or chest pain.    You should call 911 if you develop problems with breathing or chest pain.  If you are unable to contact your doctor or surgical center, you should go to the nearest emergency room or urgent care center.  Dr Baltazar telephone #: *[phone]**    If any questions arise, call your doctor.  If your doctor is not available, please feel free to call the Surgical Center at [phone].     A registered nurse may call you a few days after your  surgery to see how you are doing after your procedure.    MEDICATIONS: Resume taking daily medication.  Take prescribed pain medication with food.  If no medication is prescribed, you may take non-aspirin pain medication if needed.  PAIN MEDICATION CAN BE VERY CONSTIPATING.  Take a stool softener or laxative such as senokot, pericolace, or milk of magnesia if needed.    Prescription given for **none*.  Last pain medication given at **none*.    If your physician has prescribed pain medication that includes Acetaminophen (Tylenol), do not take additional Acetaminophen (Tylenol) while taking the prescribed medication.    Depression / Suicide Risk    As you are discharged from this Critical access hospital facility, it is important to learn how to keep safe from harming yourself.    Recognize the warning signs:  · Abrupt changes in personality, positive or negative- including increase in energy   · Giving away possessions  · Change in eating patterns- significant weight changes-  positive or negative  · Change in sleeping patterns- unable to sleep or sleeping all the time   · Unwillingness or inability to communicate  · Depression  · Unusual sadness, discouragement and loneliness  · Talk of wanting to die  · Neglect of personal appearance   · Rebelliousness- reckless behavior  · Withdrawal from people/activities they love  · Confusion- inability to concentrate     If you or a loved one observes any of these behaviors or has concerns about self-harm, here's what you can do:  · Talk about it- your feelings and reasons for harming yourself  · Remove any means that you might use to hurt yourself (examples: pills, rope, extension cords, firearm)  · Get professional help from the community (Mental Health, Substance Abuse, psychological counseling)  · Do not be alone:Call your Safe Contact- someone whom you trust who will be there for you.  · Call your local CRISIS HOTLINE 206-1672 or 856-417-4565  · Call your local Children's Mobile  Crisis Response Team [place] (282) 322-9399 or www.[employer]  · Call the toll free National Suicide Prevention Hotlines   · National Suicide Prevention Lifeline 133-239-PFOK (4656)  [facility] 800-SUICIDE (717-4843)Discharge Education for patients on ERICH (Obstructive Sleep Apnea) Protocol    Prior to receiving sedation or anesthesia, we screen all patients for Obstructive Sleep Apnea.  During your screening, you were identified as having suspected, but not confirmed Obstructive Sleep Apnea(ERICH).    What is Obstructive Sleep Apnea?  Sleep apnea (AP-ne-ah) is a common disorder which involves breathing pauses that occur during sleep.  These can last from 10 seconds to a minute or longer.  Normal breathing resumes often with a loud snort or choking sound.    Sleep apnea occurs in all age groups and both genders but is more common in men and people over 40 years of age.  It has been estimated that as many as 18 million Americans have sleep apnea.  Most people who have sleep apnea don’t know they have it because it only occurs during sleep.  A family member and/or bed partner may first notice the signs of sleep apnea.  Sleep apnea is a chronic (ongoing) condition that disrupts the quality and quantity of your sleep repeatedly throughout the night.  This often results in excessive daytime sleepiness or fatigue during the day.  It may also contribute to high blood pressure, heart problems, and complications following medications used for surgery and procedures.    To establish a definitive diagnosis, further testing from a specialist would be needed.  We recommend that you follow up with your primary care physician.    We recommend that you should be with an adult observer for at least 24 hours after your sedation/anesthesia.  If you have a CPAP machine, you should wear it during any sleep period (day or night) for the week following your procedure.  We encourage you to sleep on your side or in  a sitting position, even with napping.  Lying flat on your back increases the risk of apnea and airway obstruction during your post procedure recovery period.    It is important to prevent over-sedation that could increase your risk for apnea.  Please take all pain medication as directed by your physician.  If you are not getting pain relief, please contact your physician to discuss possible approaches to relieving pain while minimizing medications that can affect your breathing and oxygen levels.      ·

## 2022-01-05 NOTE — OR NURSING
Call placed to [facility] specialty pharmacy and left message that patient does not want prescription for pyridium.  They are closed at this time.

## 2022-01-05 NOTE — ANESTHESIA POSTPROCEDURE EVALUATION
Patient: Osvaldo Pate    Procedure Summary     Date: 01/04/22 Room / Location: [place] [address] / SURGERY [facility]    Anesthesia Start: 1801 Anesthesia Stop: 1906    Procedures:       CYSTOSCOPY, WITH URETERAL STENT INSERTION (Left Urethra)      URETEROSCOPY (Left Urethra)      LITHOTRIPSY, USING LASER (N/A Urethra) Diagnosis: (KIDNEY STONE)    Surgeons: Osvaldo Baltazar M.D. Responsible Provider: Eric Naqvi M.D.    Anesthesia Type: general ASA Status: 3          Final Anesthesia Type: general  Last vitals  BP   Blood Pressure : 150/71    Temp   36.5 °C (97.7 °F)    Pulse   65   Resp   12    SpO2   93 %      Anesthesia Post Evaluation    Patient location during evaluation: PACU  Patient participation: complete - patient participated  Level of consciousness: awake and alert    Airway patency: patent  Anesthetic complications: no  Cardiovascular status: hemodynamically stable  Respiratory status: acceptable  Hydration status: euvolemic    PONV: none          No complications documented.     Nurse Pain Score: 0 (NPRS)

## 2022-01-09 LAB
APPEARANCE STONE: NORMAL
COMPN STONE: NORMAL
SPECIMEN WT: 158 MG

## 2022-01-18 ENCOUNTER — NON-PROVIDER VISIT (OUTPATIENT)
Dept: CARDIOLOGY | Facility: MEDICAL CENTER | Age: 72
End: 2022-01-18
Payer: MEDICARE

## 2022-01-18 PROCEDURE — 99999 PR NO CHARGE: CPT | Performed by: INTERNAL MEDICINE

## 2022-01-21 ENCOUNTER — HOSPITAL ENCOUNTER (INPATIENT)
Facility: MEDICAL CENTER | Age: 72
LOS: 10 days | DRG: 463 | End: 2022-01-31
Attending: INTERNAL MEDICINE | Admitting: INTERNAL MEDICINE
Payer: MEDICARE

## 2022-01-21 ENCOUNTER — APPOINTMENT (OUTPATIENT)
Dept: RADIOLOGY | Facility: MEDICAL CENTER | Age: 72
End: 2022-01-21
Attending: EMERGENCY MEDICINE
Payer: MEDICARE

## 2022-01-21 ENCOUNTER — HOSPITAL ENCOUNTER (EMERGENCY)
Facility: MEDICAL CENTER | Age: 72
End: 2022-01-21
Attending: EMERGENCY MEDICINE
Payer: MEDICARE

## 2022-01-21 VITALS
RESPIRATION RATE: 16 BRPM | TEMPERATURE: 98.7 F | HEIGHT: 70 IN | WEIGHT: 225 LBS | SYSTOLIC BLOOD PRESSURE: 105 MMHG | BODY MASS INDEX: 32.21 KG/M2 | HEART RATE: 46 BPM | DIASTOLIC BLOOD PRESSURE: 56 MMHG | OXYGEN SATURATION: 95 %

## 2022-01-21 DIAGNOSIS — G82.20 PARAPLEGIA (HCC): ICD-10-CM

## 2022-01-21 DIAGNOSIS — M86.072 ACUTE HEMATOGENOUS OSTEOMYELITIS OF LEFT ANKLE (HCC): ICD-10-CM

## 2022-01-21 DIAGNOSIS — I10 ESSENTIAL HYPERTENSION: Chronic | ICD-10-CM

## 2022-01-21 DIAGNOSIS — G82.53 QUADRIPLEGIA, C5-C7 COMPLETE (HCC): Chronic | ICD-10-CM

## 2022-01-21 DIAGNOSIS — T14.8XXA OPEN WOUND: ICD-10-CM

## 2022-01-21 LAB
ALBUMIN SERPL BCP-MCNC: 4.1 G/DL (ref 3.2–4.9)
ALBUMIN/GLOB SERPL: 1.2 G/DL
ALP SERPL-CCNC: 79 U/L (ref 30–99)
ALT SERPL-CCNC: 8 U/L (ref 2–50)
ANION GAP SERPL CALC-SCNC: 11 MMOL/L (ref 7–16)
AST SERPL-CCNC: 16 U/L (ref 12–45)
BASOPHILS # BLD AUTO: 0.3 % (ref 0–1.8)
BASOPHILS # BLD: 0.02 K/UL (ref 0–0.12)
BILIRUB SERPL-MCNC: 0.7 MG/DL (ref 0.1–1.5)
BUN SERPL-MCNC: 12 MG/DL (ref 8–22)
CALCIUM SERPL-MCNC: 9.5 MG/DL (ref 8.4–10.2)
CHLORIDE SERPL-SCNC: 104 MMOL/L (ref 96–112)
CO2 SERPL-SCNC: 25 MMOL/L (ref 20–33)
CREAT SERPL-MCNC: 0.61 MG/DL (ref 0.5–1.4)
CRP SERPL HS-MCNC: 1.32 MG/DL (ref 0–0.75)
EOSINOPHIL # BLD AUTO: 0.24 K/UL (ref 0–0.51)
EOSINOPHIL NFR BLD: 3.2 % (ref 0–6.9)
ERYTHROCYTE [DISTWIDTH] IN BLOOD BY AUTOMATED COUNT: 51.8 FL (ref 35.9–50)
GLOBULIN SER CALC-MCNC: 3.4 G/DL (ref 1.9–3.5)
GLUCOSE SERPL-MCNC: 85 MG/DL (ref 65–99)
HCT VFR BLD AUTO: 39.2 % (ref 42–52)
HGB BLD-MCNC: 13 G/DL (ref 14–18)
IMM GRANULOCYTES # BLD AUTO: 0.05 K/UL (ref 0–0.11)
IMM GRANULOCYTES NFR BLD AUTO: 0.7 % (ref 0–0.9)
LACTATE BLD-SCNC: 1.2 MMOL/L (ref 0.5–2)
LYMPHOCYTES # BLD AUTO: 1.85 K/UL (ref 1–4.8)
LYMPHOCYTES NFR BLD: 25 % (ref 22–41)
MCH RBC QN AUTO: 33.8 PG (ref 27–33)
MCHC RBC AUTO-ENTMCNC: 33.2 G/DL (ref 33.7–35.3)
MCV RBC AUTO: 101.8 FL (ref 81.4–97.8)
MONOCYTES # BLD AUTO: 0.65 K/UL (ref 0–0.85)
MONOCYTES NFR BLD AUTO: 8.8 % (ref 0–13.4)
NEUTROPHILS # BLD AUTO: 4.58 K/UL (ref 1.82–7.42)
NEUTROPHILS NFR BLD: 62 % (ref 44–72)
NRBC # BLD AUTO: 0 K/UL
NRBC BLD-RTO: 0 /100 WBC
PLATELET # BLD AUTO: 169 K/UL (ref 164–446)
PMV BLD AUTO: 9.4 FL (ref 9–12.9)
POTASSIUM SERPL-SCNC: 4.7 MMOL/L (ref 3.6–5.5)
PROT SERPL-MCNC: 7.5 G/DL (ref 6–8.2)
RBC # BLD AUTO: 3.85 M/UL (ref 4.7–6.1)
SODIUM SERPL-SCNC: 140 MMOL/L (ref 135–145)
WBC # BLD AUTO: 7.4 K/UL (ref 4.8–10.8)

## 2022-01-21 PROCEDURE — 700111 HCHG RX REV CODE 636 W/ 250 OVERRIDE (IP): Performed by: EMERGENCY MEDICINE

## 2022-01-21 PROCEDURE — 87205 SMEAR GRAM STAIN: CPT

## 2022-01-21 PROCEDURE — 96365 THER/PROPH/DIAG IV INF INIT: CPT

## 2022-01-21 PROCEDURE — 85025 COMPLETE CBC W/AUTO DIFF WBC: CPT

## 2022-01-21 PROCEDURE — 36415 COLL VENOUS BLD VENIPUNCTURE: CPT

## 2022-01-21 PROCEDURE — 80053 COMPREHEN METABOLIC PANEL: CPT

## 2022-01-21 PROCEDURE — 83605 ASSAY OF LACTIC ACID: CPT

## 2022-01-21 PROCEDURE — 700111 HCHG RX REV CODE 636 W/ 250 OVERRIDE (IP): Performed by: STUDENT IN AN ORGANIZED HEALTH CARE EDUCATION/TRAINING PROGRAM

## 2022-01-21 PROCEDURE — 87641 MR-STAPH DNA AMP PROBE: CPT

## 2022-01-21 PROCEDURE — 87186 SC STD MICRODIL/AGAR DIL: CPT

## 2022-01-21 PROCEDURE — 87077 CULTURE AEROBIC IDENTIFY: CPT | Mod: 91

## 2022-01-21 PROCEDURE — 770001 HCHG ROOM/CARE - MED/SURG/GYN PRIV*

## 2022-01-21 PROCEDURE — 96366 THER/PROPH/DIAG IV INF ADDON: CPT

## 2022-01-21 PROCEDURE — 86140 C-REACTIVE PROTEIN: CPT

## 2022-01-21 PROCEDURE — 87147 CULTURE TYPE IMMUNOLOGIC: CPT

## 2022-01-21 PROCEDURE — 87070 CULTURE OTHR SPECIMN AEROBIC: CPT

## 2022-01-21 PROCEDURE — 73600 X-RAY EXAM OF ANKLE: CPT | Mod: LT

## 2022-01-21 PROCEDURE — 96367 TX/PROPH/DG ADDL SEQ IV INF: CPT

## 2022-01-21 PROCEDURE — 87040 BLOOD CULTURE FOR BACTERIA: CPT | Mod: 91

## 2022-01-21 PROCEDURE — 700105 HCHG RX REV CODE 258: Performed by: EMERGENCY MEDICINE

## 2022-01-21 PROCEDURE — 99233 SBSQ HOSP IP/OBS HIGH 50: CPT | Mod: DUPCHRG | Performed by: STUDENT IN AN ORGANIZED HEALTH CARE EDUCATION/TRAINING PROGRAM

## 2022-01-21 PROCEDURE — 99285 EMERGENCY DEPT VISIT HI MDM: CPT

## 2022-01-21 PROCEDURE — 700105 HCHG RX REV CODE 258: Performed by: STUDENT IN AN ORGANIZED HEALTH CARE EDUCATION/TRAINING PROGRAM

## 2022-01-21 PROCEDURE — 99285 EMERGENCY DEPT VISIT HI MDM: CPT | Performed by: HOSPITALIST

## 2022-01-21 RX ORDER — ONDANSETRON 4 MG/1
4 TABLET, ORALLY DISINTEGRATING ORAL EVERY 4 HOURS PRN
Status: DISCONTINUED | OUTPATIENT
Start: 2022-01-21 | End: 2022-01-31 | Stop reason: HOSPADM

## 2022-01-21 RX ORDER — POLYETHYLENE GLYCOL 3350 17 G/17G
1 POWDER, FOR SOLUTION ORAL
Status: DISCONTINUED | OUTPATIENT
Start: 2022-01-21 | End: 2022-01-22

## 2022-01-21 RX ORDER — POLYETHYLENE GLYCOL 3350 17 G/17G
1 POWDER, FOR SOLUTION ORAL
Status: DISCONTINUED | OUTPATIENT
Start: 2022-01-21 | End: 2022-01-21

## 2022-01-21 RX ORDER — AMOXICILLIN 250 MG
2 CAPSULE ORAL 2 TIMES DAILY
Status: DISCONTINUED | OUTPATIENT
Start: 2022-01-22 | End: 2022-01-22

## 2022-01-21 RX ORDER — AMLODIPINE BESYLATE 5 MG/1
10 TABLET ORAL DAILY
Status: DISCONTINUED | OUTPATIENT
Start: 2022-01-22 | End: 2022-01-21 | Stop reason: HOSPADM

## 2022-01-21 RX ORDER — ONDANSETRON 2 MG/ML
4 INJECTION INTRAMUSCULAR; INTRAVENOUS EVERY 4 HOURS PRN
Status: DISCONTINUED | OUTPATIENT
Start: 2022-01-21 | End: 2022-01-31 | Stop reason: HOSPADM

## 2022-01-21 RX ORDER — OXYCODONE HYDROCHLORIDE 5 MG/1
5 TABLET ORAL 2 TIMES DAILY PRN
Status: DISCONTINUED | OUTPATIENT
Start: 2022-01-21 | End: 2022-01-21 | Stop reason: HOSPADM

## 2022-01-21 RX ORDER — AMOXICILLIN 250 MG
2 CAPSULE ORAL 2 TIMES DAILY
Status: DISCONTINUED | OUTPATIENT
Start: 2022-01-21 | End: 2022-01-21 | Stop reason: HOSPADM

## 2022-01-21 RX ORDER — LOSARTAN POTASSIUM 25 MG/1
50 TABLET ORAL DAILY
Status: DISCONTINUED | OUTPATIENT
Start: 2022-01-22 | End: 2022-01-21 | Stop reason: HOSPADM

## 2022-01-21 RX ORDER — OXYCODONE HYDROCHLORIDE 5 MG/1
5 TABLET ORAL 2 TIMES DAILY PRN
Status: DISCONTINUED | OUTPATIENT
Start: 2022-01-21 | End: 2022-01-22

## 2022-01-21 RX ORDER — HYDROMORPHONE HYDROCHLORIDE 1 MG/ML
0.5 INJECTION, SOLUTION INTRAMUSCULAR; INTRAVENOUS; SUBCUTANEOUS
Status: DISCONTINUED | OUTPATIENT
Start: 2022-01-21 | End: 2022-01-31 | Stop reason: HOSPADM

## 2022-01-21 RX ORDER — OXYCODONE HYDROCHLORIDE 10 MG/1
10 TABLET ORAL
Status: DISCONTINUED | OUTPATIENT
Start: 2022-01-21 | End: 2022-01-31 | Stop reason: HOSPADM

## 2022-01-21 RX ORDER — ACETAMINOPHEN 325 MG/1
650 TABLET ORAL EVERY 6 HOURS PRN
Status: DISCONTINUED | OUTPATIENT
Start: 2022-01-21 | End: 2022-01-21 | Stop reason: HOSPADM

## 2022-01-21 RX ORDER — LOSARTAN POTASSIUM 50 MG/1
50 TABLET ORAL DAILY
Status: DISCONTINUED | OUTPATIENT
Start: 2022-01-22 | End: 2022-01-31

## 2022-01-21 RX ORDER — NITROFURANTOIN 25; 75 MG/1; MG/1
100 CAPSULE ORAL DAILY
Status: ON HOLD | COMMUNITY
End: 2022-01-31

## 2022-01-21 RX ORDER — HEPARIN SODIUM 5000 [USP'U]/ML
5000 INJECTION, SOLUTION INTRAVENOUS; SUBCUTANEOUS EVERY 8 HOURS
Status: DISCONTINUED | OUTPATIENT
Start: 2022-01-21 | End: 2022-01-21 | Stop reason: HOSPADM

## 2022-01-21 RX ORDER — OXYCODONE HYDROCHLORIDE 5 MG/1
5 TABLET ORAL
Status: DISCONTINUED | OUTPATIENT
Start: 2022-01-21 | End: 2022-01-22

## 2022-01-21 RX ORDER — SODIUM CHLORIDE, SODIUM LACTATE, POTASSIUM CHLORIDE, CALCIUM CHLORIDE 600; 310; 30; 20 MG/100ML; MG/100ML; MG/100ML; MG/100ML
INJECTION, SOLUTION INTRAVENOUS CONTINUOUS
Status: DISCONTINUED | OUTPATIENT
Start: 2022-01-21 | End: 2022-01-31

## 2022-01-21 RX ORDER — AMLODIPINE BESYLATE 5 MG/1
10 TABLET ORAL DAILY
Status: DISCONTINUED | OUTPATIENT
Start: 2022-01-22 | End: 2022-01-22

## 2022-01-21 RX ORDER — BISACODYL 10 MG
10 SUPPOSITORY, RECTAL RECTAL
Status: DISCONTINUED | OUTPATIENT
Start: 2022-01-21 | End: 2022-01-21

## 2022-01-21 RX ORDER — ACETAMINOPHEN 325 MG/1
650 TABLET ORAL EVERY 6 HOURS PRN
Status: DISCONTINUED | OUTPATIENT
Start: 2022-01-21 | End: 2022-01-31 | Stop reason: HOSPADM

## 2022-01-21 RX ORDER — LABETALOL HYDROCHLORIDE 5 MG/ML
10 INJECTION, SOLUTION INTRAVENOUS EVERY 4 HOURS PRN
Status: DISCONTINUED | OUTPATIENT
Start: 2022-01-21 | End: 2022-01-31 | Stop reason: HOSPADM

## 2022-01-21 RX ORDER — BISACODYL 10 MG
10 SUPPOSITORY, RECTAL RECTAL
Status: DISCONTINUED | OUTPATIENT
Start: 2022-01-21 | End: 2022-01-21 | Stop reason: HOSPADM

## 2022-01-21 RX ORDER — POLYETHYLENE GLYCOL 3350 17 G/17G
1 POWDER, FOR SOLUTION ORAL
Status: DISCONTINUED | OUTPATIENT
Start: 2022-01-21 | End: 2022-01-21 | Stop reason: HOSPADM

## 2022-01-21 RX ORDER — AMOXICILLIN 250 MG
2 CAPSULE ORAL 2 TIMES DAILY
Status: DISCONTINUED | OUTPATIENT
Start: 2022-01-21 | End: 2022-01-21

## 2022-01-21 RX ORDER — BISACODYL 10 MG
10 SUPPOSITORY, RECTAL RECTAL
Status: DISCONTINUED | OUTPATIENT
Start: 2022-01-21 | End: 2022-01-22

## 2022-01-21 RX ADMIN — PIPERACILLIN AND TAZOBACTAM 4.5 G: 4; .5 INJECTION, POWDER, LYOPHILIZED, FOR SOLUTION INTRAVENOUS; PARENTERAL at 15:46

## 2022-01-21 RX ADMIN — VANCOMYCIN HYDROCHLORIDE 2500 MG: 1 INJECTION, POWDER, LYOPHILIZED, FOR SOLUTION INTRAVENOUS at 16:32

## 2022-01-21 RX ADMIN — SODIUM CHLORIDE, POTASSIUM CHLORIDE, SODIUM LACTATE AND CALCIUM CHLORIDE: 600; 310; 30; 20 INJECTION, SOLUTION INTRAVENOUS at 22:19

## 2022-01-21 RX ADMIN — PIPERACILLIN AND TAZOBACTAM 4.5 G: 4; .5 INJECTION, POWDER, LYOPHILIZED, FOR SOLUTION INTRAVENOUS; PARENTERAL at 22:19

## 2022-01-21 ASSESSMENT — LIFESTYLE VARIABLES
EVER HAD A DRINK FIRST THING IN THE MORNING TO STEADY YOUR NERVES TO GET RID OF A HANGOVER: NO
TOTAL SCORE: 0
CONSUMPTION TOTAL: NEGATIVE
EVER FELT BAD OR GUILTY ABOUT YOUR DRINKING: NO
ALCOHOL_USE: YES
HAVE YOU EVER FELT YOU SHOULD CUT DOWN ON YOUR DRINKING: NO
TOTAL SCORE: 0
DOES PATIENT WANT TO STOP DRINKING: NO
ON A TYPICAL DAY WHEN YOU DRINK ALCOHOL HOW MANY DRINKS DO YOU HAVE: 1
AVERAGE NUMBER OF DAYS PER WEEK YOU HAVE A DRINK CONTAINING ALCOHOL: 7
HAVE PEOPLE ANNOYED YOU BY CRITICIZING YOUR DRINKING: NO
TOTAL SCORE: 0
HOW MANY TIMES IN THE PAST YEAR HAVE YOU HAD 5 OR MORE DRINKS IN A DAY: 0

## 2022-01-21 ASSESSMENT — ENCOUNTER SYMPTOMS
ABDOMINAL PAIN: 0
STRIDOR: 0
BRUISES/BLEEDS EASILY: 0
CHILLS: 0
SHORTNESS OF BREATH: 0
EYE DISCHARGE: 0
EYE REDNESS: 0
FEVER: 0
VOMITING: 0
FOCAL WEAKNESS: 0
MYALGIAS: 0
COUGH: 0
FLANK PAIN: 0
NERVOUS/ANXIOUS: 0

## 2022-01-21 ASSESSMENT — PAIN DESCRIPTION - PAIN TYPE: TYPE: ACUTE PAIN

## 2022-01-21 ASSESSMENT — PATIENT HEALTH QUESTIONNAIRE - PHQ9
SUM OF ALL RESPONSES TO PHQ9 QUESTIONS 1 AND 2: 0
1. LITTLE INTEREST OR PLEASURE IN DOING THINGS: NOT AT ALL
2. FEELING DOWN, DEPRESSED, IRRITABLE, OR HOPELESS: NOT AT ALL

## 2022-01-21 ASSESSMENT — FIBROSIS 4 INDEX
FIB4 SCORE: 2.68
FIB4 SCORE: 2.38

## 2022-01-21 NOTE — ED NOTES
Med rec completed per pt  Allergies reviewed    Pt completed a 17 day course of Cipro on 1/4/2022    Pt takes Macrobid nightly
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
## 2022-01-21 NOTE — ED TRIAGE NOTES
"Pt comes in via [facility] care facility  Was seen by NP and she was very concerned about wound to inner L ankle  Believes bone is showing   Wants it evaluated  Also concerning is a \"Packing\" that is stuck in his sacral area from decub that has been there for months after use of wound vac  They are unable to remove this from it's site  Pt has referral for a surgeon for this however has not heard back from them   Pt is a Quadriplegia and does not have feeling from waist down  "

## 2022-01-21 NOTE — ED NOTES
MD evaluated pt  Culture of L inner ankle wound done and sent to lab   Examined wound to sacral area as well  Appears to be healing well   Bandage replaced  Pt aware of POC to have Xr done on L ankle

## 2022-01-22 ENCOUNTER — ANESTHESIA EVENT (OUTPATIENT)
Dept: SURGERY | Facility: MEDICAL CENTER | Age: 72
DRG: 463 | End: 2022-01-22
Payer: MEDICARE

## 2022-01-22 ENCOUNTER — ANESTHESIA (OUTPATIENT)
Dept: SURGERY | Facility: MEDICAL CENTER | Age: 72
DRG: 463 | End: 2022-01-22
Payer: MEDICARE

## 2022-01-22 PROBLEM — Z93.3 S/P COLOSTOMY (HCC): Status: RESOLVED | Noted: 2019-07-27 | Resolved: 2022-01-22

## 2022-01-22 PROBLEM — I10 ESSENTIAL HYPERTENSION: Chronic | Status: ACTIVE | Noted: 2019-07-24

## 2022-01-22 PROBLEM — L89.154 PRESSURE INJURY OF SACRAL REGION, STAGE 4 (HCC): Chronic | Status: ACTIVE | Noted: 2019-07-24

## 2022-01-22 LAB
ALBUMIN SERPL BCP-MCNC: 3.7 G/DL (ref 3.2–4.9)
ALBUMIN/GLOB SERPL: 1.2 G/DL
ALP SERPL-CCNC: 72 U/L (ref 30–99)
ALT SERPL-CCNC: 9 U/L (ref 2–50)
ANION GAP SERPL CALC-SCNC: 10 MMOL/L (ref 7–16)
AST SERPL-CCNC: 11 U/L (ref 12–45)
BASOPHILS # BLD AUTO: 0.4 % (ref 0–1.8)
BASOPHILS # BLD: 0.03 K/UL (ref 0–0.12)
BILIRUB SERPL-MCNC: 0.4 MG/DL (ref 0.1–1.5)
BUN SERPL-MCNC: 14 MG/DL (ref 8–22)
CALCIUM SERPL-MCNC: 9.7 MG/DL (ref 8.5–10.5)
CHLORIDE SERPL-SCNC: 108 MMOL/L (ref 96–112)
CO2 SERPL-SCNC: 25 MMOL/L (ref 20–33)
CREAT SERPL-MCNC: 0.82 MG/DL (ref 0.5–1.4)
EOSINOPHIL # BLD AUTO: 0.24 K/UL (ref 0–0.51)
EOSINOPHIL NFR BLD: 3.6 % (ref 0–6.9)
ERYTHROCYTE [DISTWIDTH] IN BLOOD BY AUTOMATED COUNT: 53.6 FL (ref 35.9–50)
GLOBULIN SER CALC-MCNC: 3.2 G/DL (ref 1.9–3.5)
GLUCOSE SERPL-MCNC: 91 MG/DL (ref 65–99)
HCT VFR BLD AUTO: 38.8 % (ref 42–52)
HGB BLD-MCNC: 12.6 G/DL (ref 14–18)
IMM GRANULOCYTES # BLD AUTO: 0.05 K/UL (ref 0–0.11)
IMM GRANULOCYTES NFR BLD AUTO: 0.7 % (ref 0–0.9)
LYMPHOCYTES # BLD AUTO: 1.86 K/UL (ref 1–4.8)
LYMPHOCYTES NFR BLD: 27.5 % (ref 22–41)
MAGNESIUM SERPL-MCNC: 2.5 MG/DL (ref 1.5–2.5)
MCH RBC QN AUTO: 34 PG (ref 27–33)
MCHC RBC AUTO-ENTMCNC: 32.5 G/DL (ref 33.7–35.3)
MCV RBC AUTO: 104.6 FL (ref 81.4–97.8)
MONOCYTES # BLD AUTO: 0.69 K/UL (ref 0–0.85)
MONOCYTES NFR BLD AUTO: 10.2 % (ref 0–13.4)
NEUTROPHILS # BLD AUTO: 3.89 K/UL (ref 1.82–7.42)
NEUTROPHILS NFR BLD: 57.6 % (ref 44–72)
NRBC # BLD AUTO: 0 K/UL
NRBC BLD-RTO: 0 /100 WBC
PLATELET # BLD AUTO: 172 K/UL (ref 164–446)
PMV BLD AUTO: 9 FL (ref 9–12.9)
POTASSIUM SERPL-SCNC: 5 MMOL/L (ref 3.6–5.5)
PROT SERPL-MCNC: 6.9 G/DL (ref 6–8.2)
RBC # BLD AUTO: 3.71 M/UL (ref 4.7–6.1)
SARS-COV+SARS-COV-2 AG RESP QL IA.RAPID: NOTDETECTED
SCCMEC + MECA PNL NOSE NAA+PROBE: POSITIVE
SODIUM SERPL-SCNC: 143 MMOL/L (ref 135–145)
SPECIMEN SOURCE: NORMAL
WBC # BLD AUTO: 6.8 K/UL (ref 4.8–10.8)

## 2022-01-22 PROCEDURE — 27603 DRAIN LOWER LEG LESION: CPT | Mod: LT | Performed by: ORTHOPAEDIC SURGERY

## 2022-01-22 PROCEDURE — 85025 COMPLETE CBC W/AUTO DIFF WBC: CPT

## 2022-01-22 PROCEDURE — 11044 DBRDMT BONE 1ST 20 SQ CM/<: CPT | Mod: LT | Performed by: ORTHOPAEDIC SURGERY

## 2022-01-22 PROCEDURE — A9270 NON-COVERED ITEM OR SERVICE: HCPCS | Performed by: STUDENT IN AN ORGANIZED HEALTH CARE EDUCATION/TRAINING PROGRAM

## 2022-01-22 PROCEDURE — 700111 HCHG RX REV CODE 636 W/ 250 OVERRIDE (IP): Performed by: ANESTHESIOLOGY

## 2022-01-22 PROCEDURE — 501838 HCHG SUTURE GENERAL: Performed by: ORTHOPAEDIC SURGERY

## 2022-01-22 PROCEDURE — 500881 HCHG PACK, EXTREMITY: Performed by: ORTHOPAEDIC SURGERY

## 2022-01-22 PROCEDURE — 160002 HCHG RECOVERY MINUTES (STAT): Performed by: ORTHOPAEDIC SURGERY

## 2022-01-22 PROCEDURE — 302098 PASTE RING (FLAT): Performed by: STUDENT IN AN ORGANIZED HEALTH CARE EDUCATION/TRAINING PROGRAM

## 2022-01-22 PROCEDURE — 83735 ASSAY OF MAGNESIUM: CPT

## 2022-01-22 PROCEDURE — 302146: Performed by: STUDENT IN AN ORGANIZED HEALTH CARE EDUCATION/TRAINING PROGRAM

## 2022-01-22 PROCEDURE — 160027 HCHG SURGERY MINUTES - 1ST 30 MINS LEVEL 2: Performed by: ORTHOPAEDIC SURGERY

## 2022-01-22 PROCEDURE — 0QBH0ZZ EXCISION OF LEFT TIBIA, OPEN APPROACH: ICD-10-PCS | Performed by: ORTHOPAEDIC SURGERY

## 2022-01-22 PROCEDURE — 700111 HCHG RX REV CODE 636 W/ 250 OVERRIDE (IP): Performed by: STUDENT IN AN ORGANIZED HEALTH CARE EDUCATION/TRAINING PROGRAM

## 2022-01-22 PROCEDURE — 700102 HCHG RX REV CODE 250 W/ 637 OVERRIDE(OP): Performed by: STUDENT IN AN ORGANIZED HEALTH CARE EDUCATION/TRAINING PROGRAM

## 2022-01-22 PROCEDURE — 99233 SBSQ HOSP IP/OBS HIGH 50: CPT | Performed by: STUDENT IN AN ORGANIZED HEALTH CARE EDUCATION/TRAINING PROGRAM

## 2022-01-22 PROCEDURE — 36415 COLL VENOUS BLD VENIPUNCTURE: CPT

## 2022-01-22 PROCEDURE — 80053 COMPREHEN METABOLIC PANEL: CPT

## 2022-01-22 PROCEDURE — 160035 HCHG PACU - 1ST 60 MINS PHASE I: Performed by: ORTHOPAEDIC SURGERY

## 2022-01-22 PROCEDURE — 160009 HCHG ANES TIME/MIN: Performed by: ORTHOPAEDIC SURGERY

## 2022-01-22 PROCEDURE — 87426 SARSCOV CORONAVIRUS AG IA: CPT

## 2022-01-22 PROCEDURE — 99222 1ST HOSP IP/OBS MODERATE 55: CPT | Mod: 57 | Performed by: ORTHOPAEDIC SURGERY

## 2022-01-22 PROCEDURE — 160048 HCHG OR STATISTICAL LEVEL 1-5: Performed by: ORTHOPAEDIC SURGERY

## 2022-01-22 PROCEDURE — 770001 HCHG ROOM/CARE - MED/SURG/GYN PRIV*

## 2022-01-22 PROCEDURE — 700105 HCHG RX REV CODE 258: Performed by: STUDENT IN AN ORGANIZED HEALTH CARE EDUCATION/TRAINING PROGRAM

## 2022-01-22 PROCEDURE — 700101 HCHG RX REV CODE 250: Performed by: ANESTHESIOLOGY

## 2022-01-22 RX ORDER — DOCUSATE SODIUM 100 MG/1
200 CAPSULE, LIQUID FILLED ORAL 2 TIMES DAILY
Status: DISCONTINUED | OUTPATIENT
Start: 2022-01-22 | End: 2022-01-26

## 2022-01-22 RX ORDER — BACLOFEN 10 MG/1
20 TABLET ORAL 4 TIMES DAILY
Status: DISCONTINUED | OUTPATIENT
Start: 2022-01-22 | End: 2022-01-22

## 2022-01-22 RX ORDER — HYDROMORPHONE HYDROCHLORIDE 1 MG/ML
1 INJECTION, SOLUTION INTRAMUSCULAR; INTRAVENOUS; SUBCUTANEOUS
Status: DISCONTINUED | OUTPATIENT
Start: 2022-01-22 | End: 2022-01-22 | Stop reason: HOSPADM

## 2022-01-22 RX ORDER — SODIUM CHLORIDE, SODIUM GLUCONATE, SODIUM ACETATE, POTASSIUM CHLORIDE AND MAGNESIUM CHLORIDE 526; 502; 368; 37; 30 MG/100ML; MG/100ML; MG/100ML; MG/100ML; MG/100ML
500 INJECTION, SOLUTION INTRAVENOUS CONTINUOUS
Status: DISCONTINUED | OUTPATIENT
Start: 2022-01-22 | End: 2022-01-22 | Stop reason: HOSPADM

## 2022-01-22 RX ORDER — MIDAZOLAM HYDROCHLORIDE 1 MG/ML
1 INJECTION INTRAMUSCULAR; INTRAVENOUS
Status: DISCONTINUED | OUTPATIENT
Start: 2022-01-22 | End: 2022-01-22 | Stop reason: HOSPADM

## 2022-01-22 RX ORDER — IPRATROPIUM BROMIDE AND ALBUTEROL SULFATE 2.5; .5 MG/3ML; MG/3ML
3 SOLUTION RESPIRATORY (INHALATION)
Status: DISCONTINUED | OUTPATIENT
Start: 2022-01-22 | End: 2022-01-22 | Stop reason: HOSPADM

## 2022-01-22 RX ORDER — POLYETHYLENE GLYCOL 3350 17 G/17G
1 POWDER, FOR SOLUTION ORAL DAILY
Status: DISCONTINUED | OUTPATIENT
Start: 2022-01-22 | End: 2022-01-28

## 2022-01-22 RX ORDER — OXYCODONE HCL 5 MG/5 ML
10 SOLUTION, ORAL ORAL
Status: DISCONTINUED | OUTPATIENT
Start: 2022-01-22 | End: 2022-01-22 | Stop reason: HOSPADM

## 2022-01-22 RX ORDER — SENNOSIDES A AND B 8.6 MG/1
17.2 TABLET, FILM COATED ORAL 2 TIMES DAILY
Status: DISCONTINUED | OUTPATIENT
Start: 2022-01-22 | End: 2022-01-26

## 2022-01-22 RX ORDER — MAGNESIUM OXIDE 400 MG/1
400 TABLET ORAL DAILY
Status: DISCONTINUED | OUTPATIENT
Start: 2022-01-22 | End: 2022-01-22

## 2022-01-22 RX ORDER — BACLOFEN 10 MG/1
20 TABLET ORAL 4 TIMES DAILY
Status: DISCONTINUED | OUTPATIENT
Start: 2022-01-22 | End: 2022-01-31 | Stop reason: HOSPADM

## 2022-01-22 RX ORDER — CHOLECALCIFEROL (VITAMIN D3) 125 MCG
10 CAPSULE ORAL
Status: DISCONTINUED | OUTPATIENT
Start: 2022-01-22 | End: 2022-01-31 | Stop reason: HOSPADM

## 2022-01-22 RX ORDER — MEPERIDINE HYDROCHLORIDE 25 MG/ML
12.5 INJECTION INTRAMUSCULAR; INTRAVENOUS; SUBCUTANEOUS
Status: DISCONTINUED | OUTPATIENT
Start: 2022-01-22 | End: 2022-01-22 | Stop reason: HOSPADM

## 2022-01-22 RX ORDER — AMLODIPINE BESYLATE 5 MG/1
10 TABLET ORAL DAILY
Status: DISCONTINUED | OUTPATIENT
Start: 2022-01-22 | End: 2022-01-25

## 2022-01-22 RX ORDER — OXYCODONE HYDROCHLORIDE 5 MG/1
5 TABLET ORAL 2 TIMES DAILY PRN
Status: DISCONTINUED | OUTPATIENT
Start: 2022-01-22 | End: 2022-01-31 | Stop reason: HOSPADM

## 2022-01-22 RX ORDER — DIPHENHYDRAMINE HYDROCHLORIDE 50 MG/ML
12.5 INJECTION INTRAMUSCULAR; INTRAVENOUS
Status: DISCONTINUED | OUTPATIENT
Start: 2022-01-22 | End: 2022-01-22 | Stop reason: HOSPADM

## 2022-01-22 RX ORDER — HYDROMORPHONE HYDROCHLORIDE 1 MG/ML
0.2 INJECTION, SOLUTION INTRAMUSCULAR; INTRAVENOUS; SUBCUTANEOUS
Status: DISCONTINUED | OUTPATIENT
Start: 2022-01-22 | End: 2022-01-22 | Stop reason: HOSPADM

## 2022-01-22 RX ORDER — CEFAZOLIN SODIUM 1 G/3ML
INJECTION, POWDER, FOR SOLUTION INTRAMUSCULAR; INTRAVENOUS PRN
Status: DISCONTINUED | OUTPATIENT
Start: 2022-01-22 | End: 2022-01-22 | Stop reason: SURG

## 2022-01-22 RX ORDER — ONDANSETRON 2 MG/ML
4 INJECTION INTRAMUSCULAR; INTRAVENOUS
Status: DISCONTINUED | OUTPATIENT
Start: 2022-01-22 | End: 2022-01-22 | Stop reason: HOSPADM

## 2022-01-22 RX ORDER — HYDROMORPHONE HYDROCHLORIDE 1 MG/ML
0.4 INJECTION, SOLUTION INTRAMUSCULAR; INTRAVENOUS; SUBCUTANEOUS
Status: DISCONTINUED | OUTPATIENT
Start: 2022-01-22 | End: 2022-01-22 | Stop reason: HOSPADM

## 2022-01-22 RX ORDER — HALOPERIDOL 5 MG/ML
1 INJECTION INTRAMUSCULAR
Status: DISCONTINUED | OUTPATIENT
Start: 2022-01-22 | End: 2022-01-22 | Stop reason: HOSPADM

## 2022-01-22 RX ORDER — OXYCODONE HCL 5 MG/5 ML
5 SOLUTION, ORAL ORAL
Status: DISCONTINUED | OUTPATIENT
Start: 2022-01-22 | End: 2022-01-22 | Stop reason: HOSPADM

## 2022-01-22 RX ORDER — LIDOCAINE HYDROCHLORIDE 20 MG/ML
INJECTION, SOLUTION EPIDURAL; INFILTRATION; INTRACAUDAL; PERINEURAL PRN
Status: DISCONTINUED | OUTPATIENT
Start: 2022-01-22 | End: 2022-01-22 | Stop reason: SURG

## 2022-01-22 RX ADMIN — PIPERACILLIN AND TAZOBACTAM 4.5 G: 4; .5 INJECTION, POWDER, LYOPHILIZED, FOR SOLUTION INTRAVENOUS; PARENTERAL at 20:46

## 2022-01-22 RX ADMIN — PIPERACILLIN AND TAZOBACTAM 4.5 G: 4; .5 INJECTION, POWDER, LYOPHILIZED, FOR SOLUTION INTRAVENOUS; PARENTERAL at 06:06

## 2022-01-22 RX ADMIN — PIPERACILLIN AND TAZOBACTAM 4.5 G: 4; .5 INJECTION, POWDER, LYOPHILIZED, FOR SOLUTION INTRAVENOUS; PARENTERAL at 15:13

## 2022-01-22 RX ADMIN — BACLOFEN 20 MG: 10 TABLET ORAL at 15:12

## 2022-01-22 RX ADMIN — CEFAZOLIN 2 G: 330 INJECTION, POWDER, FOR SOLUTION INTRAMUSCULAR; INTRAVENOUS at 13:22

## 2022-01-22 RX ADMIN — AMLODIPINE BESYLATE 10 MG: 5 TABLET ORAL at 09:26

## 2022-01-22 RX ADMIN — SENNOSIDES 17.2 MG: 8.6 TABLET, FILM COATED ORAL at 20:46

## 2022-01-22 RX ADMIN — PROPOFOL 30 MG: 10 INJECTION, EMULSION INTRAVENOUS at 13:28

## 2022-01-22 RX ADMIN — PROPOFOL 50 MG: 10 INJECTION, EMULSION INTRAVENOUS at 13:25

## 2022-01-22 RX ADMIN — DOCUSATE SODIUM 200 MG: 100 CAPSULE ORAL at 15:12

## 2022-01-22 RX ADMIN — DOCUSATE SODIUM 200 MG: 100 CAPSULE ORAL at 20:46

## 2022-01-22 RX ADMIN — PROPOFOL 30 MG: 10 INJECTION, EMULSION INTRAVENOUS at 13:31

## 2022-01-22 RX ADMIN — SENNOSIDES AND DOCUSATE SODIUM 2 TABLET: 50; 8.6 TABLET ORAL at 06:05

## 2022-01-22 RX ADMIN — LIDOCAINE HYDROCHLORIDE 50 MG: 20 INJECTION, SOLUTION EPIDURAL; INFILTRATION; INTRACAUDAL at 13:25

## 2022-01-22 RX ADMIN — BACLOFEN 20 MG: 10 TABLET ORAL at 20:46

## 2022-01-22 RX ADMIN — Medication 10 MG: at 20:46

## 2022-01-22 RX ADMIN — VANCOMYCIN HYDROCHLORIDE 1250 MG: 5 INJECTION, POWDER, LYOPHILIZED, FOR SOLUTION INTRAVENOUS at 17:47

## 2022-01-22 RX ADMIN — VANCOMYCIN HYDROCHLORIDE 1250 MG: 5 INJECTION, POWDER, LYOPHILIZED, FOR SOLUTION INTRAVENOUS at 04:05

## 2022-01-22 ASSESSMENT — ENCOUNTER SYMPTOMS
CONSTIPATION: 0
COUGH: 0
SPUTUM PRODUCTION: 0
DIARRHEA: 0
CHILLS: 0
SHORTNESS OF BREATH: 0
ABDOMINAL PAIN: 0
NAUSEA: 0
DOUBLE VISION: 0
HEADACHES: 0
DIZZINESS: 0
FOCAL WEAKNESS: 0
MYALGIAS: 0
WHEEZING: 0
PALPITATIONS: 0
WEAKNESS: 1
VOMITING: 0
NERVOUS/ANXIOUS: 0
BLURRED VISION: 0
SORE THROAT: 0
SINUS PAIN: 0
FEVER: 0

## 2022-01-22 ASSESSMENT — COGNITIVE AND FUNCTIONAL STATUS - GENERAL
TURNING FROM BACK TO SIDE WHILE IN FLAT BAD: A LOT
CLIMB 3 TO 5 STEPS WITH RAILING: TOTAL
SUGGESTED CMS G CODE MODIFIER MOBILITY: CM
EATING MEALS: A LITTLE
MOBILITY SCORE: 8
HELP NEEDED FOR BATHING: A LOT
DRESSING REGULAR UPPER BODY CLOTHING: A LOT
SUGGESTED CMS G CODE MODIFIER MOBILITY: CM
MOBILITY SCORE: 8
PERSONAL GROOMING: A LITTLE
PERSONAL GROOMING: A LITTLE
SUGGESTED CMS G CODE MODIFIER DAILY ACTIVITY: CL
DRESSING REGULAR LOWER BODY CLOTHING: A LOT
STANDING UP FROM CHAIR USING ARMS: TOTAL
DAILY ACTIVITIY SCORE: 13
WALKING IN HOSPITAL ROOM: TOTAL
HELP NEEDED FOR BATHING: A LOT
TOILETING: TOTAL
DRESSING REGULAR LOWER BODY CLOTHING: A LOT
MOVING FROM LYING ON BACK TO SITTING ON SIDE OF FLAT BED: UNABLE
DRESSING REGULAR UPPER BODY CLOTHING: A LOT
DAILY ACTIVITIY SCORE: 13
TOILETING: TOTAL
EATING MEALS: A LITTLE
SUGGESTED CMS G CODE MODIFIER DAILY ACTIVITY: CL
STANDING UP FROM CHAIR USING ARMS: TOTAL
MOVING TO AND FROM BED TO CHAIR: A LOT
MOVING FROM LYING ON BACK TO SITTING ON SIDE OF FLAT BED: UNABLE
MOVING TO AND FROM BED TO CHAIR: A LOT
TURNING FROM BACK TO SIDE WHILE IN FLAT BAD: A LOT
WALKING IN HOSPITAL ROOM: TOTAL
CLIMB 3 TO 5 STEPS WITH RAILING: TOTAL

## 2022-01-22 ASSESSMENT — PAIN DESCRIPTION - PAIN TYPE
TYPE: SURGICAL PAIN
TYPE: ACUTE PAIN
TYPE: SURGICAL PAIN

## 2022-01-22 ASSESSMENT — FIBROSIS 4 INDEX: FIB4 SCORE: 1.51

## 2022-01-22 ASSESSMENT — PAIN SCALES - GENERAL: PAIN_LEVEL: 0

## 2022-01-22 NOTE — ANESTHESIA PREPROCEDURE EVALUATION
Case: [id number] Date/Time: 01/22/22 1245    Procedures:       INCISION AND DRAINAGE, WOUND, BY ORTHOPEDICS      APPLICATION OR REPLACEMENT,WOUND VAC    Anesthesia type: General    Pre-op diagnosis: Non-Healing Left Lower Extremity Wound    Location: [facility] OR  / SURGERY [facility]    Surgeons: Erasmo Stewart M.D.          Relevant Problems   CARDIAC   (positive) Essential hypertension   (positive) Hypertension   (positive) Paroxysmal atrial flutter (HCC)         (positive) Nephrolithiasis      Other   (positive) Osteomyelitis of left ankle (HCC)       Physical Exam    Airway   Mallampati: II  TM distance: >3 FB  Neck ROM: full       Cardiovascular - normal exam  Rhythm: regular  Rate: normal  (-) murmur     Dental - normal exam           Pulmonary - normal exam  Breath sounds clear to auscultation     Abdominal    Neurological - normal exam         Other findings: Cervical quadriplegia, multiple medical problems, infected open leg wound            Anesthesia Plan    ASA 3- EMERGENT       Plan - MAC               Induction: intravenous    Postoperative Plan: Postoperative administration of opioids is intended.    Pertinent diagnostic labs and testing reviewed    Informed Consent:    Anesthetic plan and risks discussed with patient.    Use of blood products discussed with: patient whom consented to blood products.
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
## 2022-01-22 NOTE — PROGRESS NOTES
4 Eyes Skin Assessment Completed by ZAIRA Sinclair and ZAIRA Llamas.    Head WDL  Ears WDL  Nose WDL  Mouth WDL  Neck WDL  Breast/Chest WDL  Shoulder Blades WDL  Spine WDL  (R) Arm/Elbow/Hand WDL  (L) Arm/Elbow/Hand WDL  Abdomen WDL left lower ostomy bag  Groin WDL suprapubic cath   Scrotum/Coccyx/Buttocks Dressing in place. Patient refused to have it removed. Wound care consulted.  (R) Leg WDL   (L) Leg Dressing in place. Patient refused removal. Wound care consulted.  (R) Heel/Foot/Toe WDL  (L) Heel/Foot/Toe Dressing in place. Patient refused removal. Wound care consulted.          Devices In Places None      Interventions In Place Heel Float Boots    Possible Skin Injury Yes    Pictures Uploaded Into Epic No, needs to be completed  Wound Consult Placed Yes  RN Wound Prevention Protocol Ordered Yes

## 2022-01-22 NOTE — PROGRESS NOTES
"BP elevated at 179/74 HR 44. Patient reports that this normal for him. Offered scheduled and PRN BP meds. Patient refused, he reports that BP meds will make his BP go down too fast. He states \"It will go down naturally throughout the day\". Notified Dr. Street. Awaiting callback/further orders.  "

## 2022-01-22 NOTE — PROGRESS NOTES
"Med rec completed by [employer] rec tech at [facility] on 1/21/2022, prior to transfer:    \"Med rec completed per pt  Allergies reviewed     Pt completed a 17 day course of Cipro on 1/4/2022     Pt takes Macrobid nightly\"  "
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
## 2022-01-22 NOTE — OP REPORT
DATE OF SERVICE:  01/22/2022     PREOPERATIVE DIAGNOSIS:  Left medial leg wound.     POSTOPERATIVE DIAGNOSIS:  Left medial leg wound.     PROCEDURE:  Irrigation and debridement of skin, subcutaneous tissue,   underlying muscle and bone, left leg wound.     SURGEON:  Erasmo Stewart MD     ASSISTANT:  Dario Alcaraz PA-C     ESTIMATED BLOOD LOSS:  Minimal.     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  Risks and benefits of irrigation and   debridement were discussed, which include but not limited to bleeding,   infection, neurovascular damage, pain, stiffness, DVT, PE, MI, stroke, death   and need for further surgery including flap coverage or amputation.  He   understands all these risks and wishes to proceed.     DESCRIPTION OF PROCEDURE:  The patient was sedated with LMA anesthesia and   administered preoperative antibiotics.  Left leg was prepped and draped in   sterile fashion.  His 2x3 cm wound was debrided of a large amount of   heterotopic bone and all edges were debrided back to healthy tissue.  The   wounds were then irrigated.  The tissue was quite friable and as a result,   despite mobilizing some skin, it was unable to be closed completely.  Sterile   dressings were applied.  The patient tolerated the procedure well.     POSTOPERATIVE PLAN:  The patient to be seen by wound care for dressing changes   versus wound VAC to close versus flap coverage for plastics.  He should not   need any more future orthopedic surgical intervention unless he desires a bone   amputation.        ______________________________  Erasmo Stewart MD PLA/MARINA    DD:  01/22/2022 13:39  DT:  01/22/2022 13:59    Job#:  [id number]

## 2022-01-22 NOTE — H&P
Hospital Medicine History & Physical Note    Date of Service  1/21/2022    Primary Care Physician  Britni Ordonez, [place].    Consultants  Ortho at [facility] [Dr Lynch] consulted recommended admitting to [facility] for multi disciplinary team including Ortho [Dr. Claros] and plastics.      Code Status  Full Code    Chief Complaint  Chief Complaint   Patient presents with   • Wound Check     sent here by NP for wound to inner L ankle   unknown cause of this  also has wound to sacral area w/ wound vac sponge imbedded in it  unable to get out  (wound vac since june last yr )      History of Presenting Illness  Osvaldo Pate is a 71 y.o. male with a past medical history of hypertension and paraplegia who presented 1/21/2022 with nonhealing wound over the left ankle.  Patient reports that he has been having this wound for the past 3 years but has been recently worsening.  He has home health nursing who noticed possible bone exposure and concern for osteomyelitis.  The patient has paraplegia so he does not have pain sensation.  He denies having fevers chills cough shortness of breath.    I discussed the plan of care with emergency department physician, and the patient.    Review of Systems  Review of Systems   Constitutional: Negative for chills and fever.   Eyes: Negative for discharge and redness.   Respiratory: Negative for cough, shortness of breath and stridor.    Cardiovascular: Negative for chest pain and leg swelling.   Gastrointestinal: Negative for abdominal pain and vomiting.   Genitourinary: Negative for flank pain.   Musculoskeletal: Negative for myalgias.        Open wound and discharge from the left ankle   Skin: Negative.    Neurological: Negative for focal weakness.   Endo/Heme/Allergies: Does not bruise/bleed easily.   Psychiatric/Behavioral: The patient is not nervous/anxious.      Past Medical History   has a past medical history of A-fib (HCC), Atrial flutter (HCC), Blood clotting  "disorder (HCC), Bowel habit changes, GERD (gastroesophageal reflux disease), Hypertension, Neurogenic bladder, and Quadriplegia, C5-C7 complete (HCC).    Surgical History   has a past surgical history that includes percutaneouospinning lower extremity; colostomy; colostomy takedown; colostomy (N/A, 7/27/2019); ulcer debridement (N/A, 8/21/2019); flap closure (8/21/2019); hernia repair; irrigation & debridement general (12/20/2020); pr cystoscopy,insert ureteral stent (Left, 12/16/2021); pr cysto/uretero/pyeloscopy, dx (Left, 12/16/2021); lasertripsy (Left, 12/16/2021); pr cystoscopy,insert ureteral stent (Left, 1/4/2022); pr cysto/uretero/pyeloscopy, dx (Left, 1/4/2022); and lasertripsy (N/A, 1/4/2022).     Family History  family history includes Heart Disease in his father.      Social History   reports that he quit smoking about 45 years ago. His smoking use included cigarettes. He started smoking about 55 years ago. He smoked 1.00 pack per day. He has never used smokeless tobacco. He reports current alcohol use of about 0.6 oz of alcohol per week. He reports that he does not use drugs.    Allergies  Allergies   Allergen Reactions   • Sulfa Drugs Rash     Rash, developed this back in 2015 after being placed on \"sulfa antibiotic for my wound\". Antibiotic was stopped and rash went away. Patient states he had a sulfa antibiotic prior to that time back when he was younger w/o a reaction.        Medications  Prior to Admission Medications   Prescriptions Last Dose Informant Patient Reported? Taking?   Ascorbic Acid (VITAMIN C) 1000 MG Tab 1/21/2022 at 0800 MAR from Other Facility Yes No   Sig: Take 1,000 mg by mouth every day.   Cholecalciferol (VITAMIN D) 2000 UNIT Tab 1/21/2022 at 0800 MAR from Other Facility Yes No   Sig: Take 2,000 Units by mouth every day.   Probiotic Product (PROBIOTIC PO) 1/21/2022 at 0800 MAR from Other Facility Yes No   Sig: Take 1 Tab by mouth every day.   Zinc Sulfate 50 MG Cap 1/21/2022 at " 0800 MAR from Other Facility Yes No   Sig: Take 50 mg by mouth every day.   acetaminophen (TYLENOL) 500 MG Tab 1/21/2022 at 0800 MAR from Other Facility Yes No   Sig: Take 1,000 mg by mouth 2 times a day. Takes 1000 mg twice daily then 500 mg every 4 hours as needed (3grams per 24 hours)    amLODIPine (NORVASC) 10 MG Tab > 3 WEEKS at UNK MAR from Other Facility Yes No   Sig: Take 10 mg by mouth every day. Hold <100/64   baclofen (LIORESAL) 20 MG tablet 1/21/2022 at 1200 MAR from Other Facility Yes No   Sig: Take 20 mg by mouth 4 times a day.   ciprofloxacin (CIPRO) 500 MG Tab 1/4/2022 at COMPLETED MAR from Other Facility Yes No   Sig: Take 500 mg by mouth 2 times a day. 17 day course   docusate sodium (COLACE) 100 MG Cap 1/21/2022 at 0800 MAR from Other Facility Yes No   Sig: Take 200 mg by mouth 2 times a day.   ferrous sulfate 325 (65 Fe) MG tablet 1/21/2022 at 0800 MAR from Other Facility Yes No   Sig: Take 325 mg by mouth 2 Times a Day.   losartan (COZAAR) 50 MG Tab > 3 WEEKS at UNK MAR from Other Facility Yes No   Sig: Take 50 mg by mouth every day. Hold <100/64   magnesium oxide (MAG-OX) 400 MG Tab tablet 1/21/2022 at 0800 MAR from Other Facility Yes No   Sig: Take 400 mg by mouth every day.   melatonin 5 mg Tab 1/20/2022 at PM MAR from Other Facility Yes No   Sig: Take 10 mg by mouth at bedtime.   nitrofurantoin (MACROBID) 100 MG Cap 1/20/2022 at PM MAR from Other Facility Yes Yes   Sig: Take 100 mg by mouth every day.   oxyCODONE immediate-release (ROXICODONE) 5 MG Tab > 3 WEEKS AGOUNK at UNK MAR from Other Facility No No   Sig: Take one tab po BID PRN pain   Patient taking differently: Take 5 mg by mouth 2 times a day as needed. Take one tab po BID PRN pain   polyethylene glycol/lytes (MIRALAX) 17 g Pack 1/21/2022 at 0800 MAR from Other Facility Yes No   Sig: Take 17 g by mouth every day.   sennosides (SENOKOT) 8.6 MG Tab 1/21/2022 at 0800 MAR from Other Facility Yes No   Sig: Take 17.2 mg by mouth 2 times  a day.      Facility-Administered Medications: None     Physical Exam  Temp:  [37.1 °C (98.7 °F)] 37.1 °C (98.7 °F)  Pulse:  [46-49] 46  Resp:  [16] 16  BP: (102-150)/(51-72) 105/56  SpO2:  [95 %-96 %] 95 %  Blood Pressure : 150/72   Temperature: 37.1 °C (98.7 °F)   Pulse: (!) 49   Respiration: 16   Pulse Oximetry: 96 %     Physical Exam  Constitutional:       General: He is not in acute distress.     Appearance: He is not ill-appearing or diaphoretic.   HENT:      Head: Atraumatic.      Right Ear: External ear normal.      Left Ear: External ear normal.      Nose: No congestion or rhinorrhea.      Mouth/Throat:      Mouth: Mucous membranes are moist.   Eyes:      General: No scleral icterus.        Right eye: No discharge.         Left eye: No discharge.      Pupils: Pupils are equal, round, and reactive to light.   Cardiovascular:      Rate and Rhythm: Normal rate and regular rhythm.   Pulmonary:      Effort: Pulmonary effort is normal.   Abdominal:      General: There is no distension.   Musculoskeletal:         General: Tenderness present.      Cervical back: Neck supple. No rigidity. No muscular tenderness.      Right lower leg: No edema.      Left lower leg: No edema.      Comments: Open wound and discharge left ankle   Skin:     Coloration: Skin is not jaundiced or pale.   Neurological:      Mental Status: He is alert and oriented to person, place, and time.      Comments: Paraplegia   Psychiatric:         Mood and Affect: Mood normal.         Behavior: Behavior normal.       Laboratory:  Recent Labs     01/21/22  1518   WBC 7.4   RBC 3.85*   HEMOGLOBIN 13.0*   HEMATOCRIT 39.2*   .8*   MCH 33.8*   MCHC 33.2*   RDW 51.8*   PLATELETCT 169   MPV 9.4     Recent Labs     01/21/22  1518   SODIUM 140   POTASSIUM 4.7   CHLORIDE 104   CO2 25   GLUCOSE 85   BUN 12   CREATININE 0.61   CALCIUM 9.5     Recent Labs     01/21/22  1518   ALTSGPT 8   ASTSGOT 16   ALKPHOSPHAT 79   TBILIRUBIN 0.7   GLUCOSE 85         No  results for input(s): NTPROBNP in the last 72 hours.      No results for input(s): TROPONINT in the last 72 hours.    Imaging:  DX-ANKLE 2- VIEWS LEFT   Final Result      Healed fracture deformity of the distal tibia with extensive surrounding heterotopic ossification.      Healed fracture deformity of the distal fibula.      There is heterogeneous appearance of the cortex along the anterior aspect of the tibia. Osteomyelitis is possible. Further evaluation with MRI can be helpful        Assessment/Plan:  I anticipate this patient will require at least two midnights for appropriate medical management, necessitating inpatient admission.    * Osteomyelitis of left ankle (HCC)- (present on admission)  Assessment & Plan  Imaging shows evidence for heterogeneous appearance of the cortex along the anterior aspect of the tibia concerning for osteomyelitis.  Ortho at [facility]] consulted recommended admitting to [facility] for multi disciplinary team including Ortho [Dr Claros, contacted by EDP] and plastics [will need to be consulted]  Patient will be admitted directly to regional  Started on Zosyn and vancomycin in the emergency room, continue, follow cultures and sensitivities    Wound infection- (present on admission)  Assessment & Plan  Started on Zosyn and vancomycin in the emergency room, continue, follow cultures and sensitivities    Hypertension- (present on admission)  Assessment & Plan  Resume home amlodipine with hold parameters    VTE prophylaxis: SCDs/TEDs, anticipate need for surgical intervention

## 2022-01-22 NOTE — ANESTHESIA PROCEDURE NOTES
Peripheral IV    Date/Time: 1/22/2022 1:25 PM  Performed by: Shiraz Ortiz III, M.D.  Authorized by: Shiraz Ortiz III, M.D.     Size:  18 G (long Jelco)  Laterality:  Left (hand)  Local Anesthetic:  Lidocaine 1%  Site Prep:  Alcohol  Technique:  Direct puncture  Attempts:  1   Right AC PIV from floor was infiltrated prior to arrival in preop!

## 2022-01-22 NOTE — WOUND TEAM
[facility]  Inpatient Services  Initial Wound and Skin Care Evaluation    Admission Date: 1/21/2022     Last order of IP CONSULT TO WOUND CARE was found on 1/22/2022 from Hospital Encounter on 1/21/2022     HPI, PMH, SH: Reviewed    Past Surgical History:   Procedure Laterality Date   • SC CYSTOSCOPY,INSERT URETERAL STENT Left 1/4/2022    Procedure: CYSTOSCOPY, WITH URETERAL STENT INSERTION;  Surgeon: Osvaldo Baltazar M.D.;  Location: [facility];  Service: Urology   • SC CYSTO/URETERO/PYELOSCOPY, DX Left 1/4/2022    Procedure: URETEROSCOPY;  Surgeon: Osvaldo Baltazar M.D.;  Location: [facility];  Service: Urology   • LASERTRIPSY N/A 1/4/2022    Procedure: LITHOTRIPSY, USING LASER;  Surgeon: Osvaldo Baltazar M.D.;  Location: [facility];  Service: Urology   • SC CYSTOSCOPY,INSERT URETERAL STENT Left 12/16/2021    Procedure: CYSTOSCOPY, WITH URETERAL STENT INSERTION;  Surgeon: Aly Bowen M.D.;  Location: [facility];  Service: Urology   • SC CYSTO/URETERO/PYELOSCOPY, DX Left 12/16/2021    Procedure: URETEROSCOPY;  Surgeon: Aly Bowen M.D.;  Location: [facility];  Service: Urology   • LASERTRIPSY Left 12/16/2021    Procedure: LITHOTRIPSY, USING LASER;  Surgeon: Aly Bowen M.D.;  Location: [facility];  Service: Urology   • IRRIGATION & DEBRIDEMENT GENERAL  12/20/2020    Procedure: IRRIGATION AND DEBRIDEMENT, WOUND SACRAL ULCER;  Surgeon: Matt Cummins M.D.;  Location: [facility];  Service: Plastics   • ULCER DEBRIDEMENT N/A 8/21/2019    Procedure: debridement of Sacral grade 4 ulcer - W/BONE BIOPSY, 3 liter wash out. bilateral sliding gluteal myocutaneous flap advancement;  Surgeon: Amadeo Moon M.D.;  Location: [facility];  Service: Plastics   • FLAP CLOSURE  8/21/2019    Procedure: CLOSURE, FLAP - MUSCLE;  Surgeon: Amadeo Moon M.D.;  Location: SURGERY [facility];   Service: Plastics   • COLOSTOMY N/A 2019    Procedure: CREATION, COLOSTOMY -  placement;  Surgeon: Elías Hannah M.D.;  Location: SURGERY [facility];  Service: General   • COLOSTOMY     • COLOSTOMY TAKEDOWN     • HERNIA REPAIR     • PERCUTANEOUOSPINNING LOWER EXTREMITY       Social History     Tobacco Use   • Smoking status: Former Smoker     Packs/day: 1.00     Types: Cigarettes     Start date: 1967     Quit date: 1977     Years since quittin.0   • Smokeless tobacco: Never Used   Substance Use Topics   • Alcohol use: Yes     Alcohol/week: 0.6 oz     Types: 1 Standard drinks or equivalent per week     Comment: nightly     No chief complaint on file.    Diagnosis: Osteomyelitis    Unit where seen by Wound Team: T OR POOL/NONE     WOUND CONSULT/FOLLOW UP RELATED TO:  Sacrum, left ankle, colostomy     WOUND HISTORY:  Patient has C5-C7 complete paraplegia since 2019.  Patient had developed decubitus ulcers that was flapped in 2019 by Dr. Moon, flap failed and pressure injury still present, and per patient, black foam still stuck in wound bed depth and tissue has grown around foam and is unable to be removed unless goes to surgery.     WOUND ASSESSMENT/LDA  Wound 21 Pressure Injury Sacrum (Active)   Wound Image   22 1300   Site Assessment Red;Black 22 1300   Periwound Assessment Scar tissue 22 1300   Margins Unattached edges;Defined edges 22 1300   Closure Secondary intention 22 1300   Drainage Amount Scant 22 1300   Drainage Description Serosanguineous 22 1300   Treatments Cleansed;Site care;Tape change 22 1300   Wound Cleansing Approved Wound Cleanser 22 1300   Periwound Protectant Skin Protectant Wipes to Periwound 22 1300   Dressing Cleansing/Solutions Not Applicable 22 1300   Dressing Options Hydrofiber Silver;Mepilex 22 1300   Dressing Changed Changed 22 1300   Dressing Status Clean;Dry;Intact  01/22/22 1300   Dressing Change/Treatment Frequency Every 48 hrs, and As Needed 01/22/22 1300   NEXT Dressing Change/Treatment Date 01/24/22 01/22/22 1300   NEXT Weekly Photo (Inpatient Only) 01/29/22 01/22/22 1300   Pressure Injury Stage U 01/22/22 1300   Wound Length (cm) 0.5 cm 01/22/22 1300   Wound Width (cm) 1 cm 01/22/22 1300   Wound Depth (cm) 0.6 cm 01/22/22 1300   Wound Surface Area (cm^2) 0.5 cm^2 01/22/22 1300   Wound Volume (cm^3) 0.3 cm^3 01/22/22 1300   Wound Healing % 58 01/22/22 1300   Wound Odor None 12/16/21 1400   Exposed Structures KEN 01/22/22 1300   WOUND NURSE ONLY - Time Spent with Patient (mins) 60 12/16/21 1400   Number of days: 37        Vascular:    JUAN MANUEL:   No results found.    Lab Values:    Lab Results   Component Value Date/Time    WBC 6.8 01/22/2022 02:10 AM    RBC 3.71 (L) 01/22/2022 02:10 AM    HEMOGLOBIN 12.6 (L) 01/22/2022 02:10 AM    HEMATOCRIT 38.8 (L) 01/22/2022 02:10 AM    CREACTPROT 1.32 (H) 01/21/2022 03:18 PM    SEDRATEWES 71 (H) 12/18/2020 02:25 PM        Culture Results show:  No results found for this or any previous visit (from the past 720 hour(s)).    Pain Level/Medicated:  No pain, no sensation       INTERVENTIONS BY WOUND TEAM:  Chart and images reviewed. Discussed with bedside RN. All areas of concern (based on picture review, LDA review and discussion with bedside RN) have been thoroughly assessed. Documentation of areas based on significant findings. This RN in to assess patient. Performed standard wound care which includes appropriate positioning, dressing removal and non-selective debridement. Pictures and measurements obtained weekly if/when required.  SACRUM  Preparation for Dressing removal: Dressing soaked with n/a. Removed adhesive foam and aquacel  Non-selectively Debrided with:  wound cleanser and gauze.  Sharp debridement: n/a  Raeann wound: Cleansed with wound cleanser, Prepped with no sting skin prep  Primary Dressing: aquacel ag  Secondary (Outer)  "Dressing: mepilex    LEFT ANKLE: patient going to OR today at 1245 with MD Stewart, xeroform gauze in place.     COLOSTOMY: well established.  patient cares for self and uses bedside assistance when inpatient. In 2 3/4\" two piece pouch with paste ring. states wound RN does not need to change.      Interdisciplinary consultation: Patient, Bedside RN, wound RN Irish, updated MD Vega via Voalte.     EVALUATION / RATIONALE FOR TREATMENT:  Most Recent Date:  1/22/22: patient with POA wounds. Colostomy established, all supplies ordered. Left ankle with full thickness wound, has had fracture in area, osteomyelitis is possible, xeroform at this time, patient going to OR with MD Stewart today. Patient sacrum with unstageable recurring pressure injury present, is chronic, and per patient, has foam embedded in the wound and the patient has been trying to get scheduled with plastics with no success outpatient, can see black foam in base, tissue grown around, otherwise wound is very small and surrounding tissue with scar tissue present. Requesting potential inpatient plastics consult. Aquacel Ag Hydrofiber applied to manage bioburden, absorb exudate, and maintain a moist wound environment without laterally wicking exudate therefore reducing shira-wound maceration with mepilex to offload.        Goals: Steady decrease in wound area and depth weekly.    WOUND TEAM PLAN OF CARE ([X] for frequency of wound follow up,):   Nursing to follow orders written for wound care. Contact wound team if area fails to progress, deteriorates or with any questions/concerns  Dressing changes by wound team:                   Follow up 3 times weekly:                NPWT change 3 times weekly:     Follow up 1-2 times weekly:  X - sacrum , going to OR for foot 1/22    Follow up Bi-Monthly:                   Follow up as needed:     Other (explain):     NURSING PLAN OF CARE ORDERS (X):  Dressing changes: See Dressing Care orders: X  Skin care: See Skin " Care orders: X  RN Prevention Protocol: X  Rectal tube care: See Rectal Tube Care orders:   Other orders:    RSKIN:   CURRENTLY IN PLACE (X), APPLIED THIS VISIT (A), ORDERED (O):   Q shift Luis Enrique:  X  Q shift pressure point assessments:  X    Surface/Positioning   Pressure redistribution mattress            Low Airloss  X        Bariatric foam      Bariatric JERARDO     Waffle cushion        Waffle Overlay          Reposition q 2 hours X     TAPs Turning system     Z Pankaj Pillow     Offloading/Redistribution   Sacral Mepilex (Silicone dressing)  X   Heel Mepilex (Silicone dressing)         Heel float boots (Prevalon boot)   X          Float Heels off Bed with Pillows    X       Respiratory   Silicone O2 tubing    X     Gray Foam Ear protectors     Cannula fixation Device (Tender )          High flow offloading Clip    Elastic head band offloading device      Anchorfast                                                         Trach with Optifoam split foam             Containment/Moisture Prevention suprapubic, colostomy    Rectal tube or BMS    Purwick/Condom Cath        Cazares Catheter    Barrier wipes           Barrier paste       Antifungal tx      Interdry        Mobilization incomplete quad      Up to chair        Ambulate      PT/OT      Nutrition       Dietician        Diabetes Education      PO     TF     TPN     NPOX   # days     Other        Anticipated discharge plans: TBD  LTACH:    X    SNF/Rehab:   X               Home Health Care:     X      Outpatient Wound Center:    X - previously went to outpatient.         Self/Family Care:        Other:                  Vac Discharge Needs:   Not Applicable Pt not on a wound vac:  X     Regular Vac while inpatient, alternative dressing at DC:        Regular Vac in use and continued at DC:            Reg. Vac w/ Skin Sub/Biologic in use. Will need to be changed 2x wkly:      Veraflo Vac while inpatient, ok to transition to Regular Vac on Discharge:           Veraflo  Vac while inpatient, will need to remain on Veraflo Vac upon discharge:

## 2022-01-22 NOTE — CARE PLAN
The patient is Watcher - Medium risk of patient condition declining or worsening    Shift Goals  Clinical Goals: turns, control BP  Patient Goals: rest    Progress made toward(s) clinical / shift goals:    Problem: Knowledge Deficit - Standard  Goal: Patient and family/care givers will demonstrate understanding of plan of care, disease process/condition, diagnostic tests and medications  Outcome: Progressing     Problem: Skin Integrity  Goal: Skin integrity is maintained or improved  Outcome: Progressing     Problem: Fall Risk  Goal: Patient will remain free from falls  Outcome: Progressing       Patient is not progressing towards the following goals:

## 2022-01-22 NOTE — PROGRESS NOTES
Orthopaedic PA Progress Note    Interval changes Transferred overnight from [facility] for definitive care  71M S/P intermediate 3 years ago, quadriplegic since.  Denies DM    Subacute vs chronic wound ove left ankle medial malleolus, with failure to heal despite aggressive wound care / nursing.Recently caregive could palpate small slive of bone protruding through wound bed. Currently in heel floats with wet- dressings - I removed these for exam.    DX ankle yesterday revealed  Healed fracture deformity of the distal tibia with extensive surrounding heterotopic ossification. Healed fracture deformity of the distal fibula. There is heterogeneous appearance of the cortex along the anterior aspect of the tibia. Osteomyelitis is possible.     MR is ordered and pending.   .  ROS - Patient denies any new issues. No chest pain, dyspnea, or fever.  Pain well controlled.    BP (!) 166/70   Pulse (!) 47   Temp (!) 35.7 °C (96.3 °F) (Temporal)   Resp 18   Wt 105 kg (231 lb 7.7 oz)   SpO2 94%     Patient seen and examined  No acute distress  Breathing non labored  RRR  Skin defect 3x2 cm over medial mal.  Insensate  No motor  No bleeding or other drainage. Sharp fragment protrudes to skin level with healthy-appearing granulation bed.    Recent Labs     01/21/22  1518 01/22/22  0210   WBC 7.4 6.8   RBC 3.85* 3.71*   HEMOGLOBIN 13.0* 12.6*   HEMATOCRIT 39.2* 38.8*   .8* 104.6*   MCH 33.8* 34.0*   MCHC 33.2* 32.5*   RDW 51.8* 53.6*   PLATELETCT 169 172   MPV 9.4 9.0     A/ skin defect L ankle p HO from old injury    P/ xeroform /gauze dressing placed, dried surround tissues with towel   MR to assess for presence and extent of OM  To OR p MR for definitive care nonhealing wound LLE

## 2022-01-22 NOTE — OR NURSING
1339: Pt arrives to PACU asleep and calm. VSS. Left ankle dressing CDI. Pt denies pain or nausea.     1410: Pt continues to deny pain or nausea. Dressing CDI. Report called to Ileana MENESES.

## 2022-01-22 NOTE — CARE PLAN
The patient is Stable - Low risk of patient condition declining or worsening         Progress made toward(s) clinical / shift goals:  *  Problem: Skin Integrity  Goal: Skin integrity is maintained or improved  Outcome: Not Progressing   **    Patient is not progressing towards the following goals:      Problem: Skin Integrity  Goal: Skin integrity is maintained or improved  Outcome: Not Progressing

## 2022-01-22 NOTE — ASSESSMENT & PLAN NOTE
Started on Zosyn and vancomycin in the emergency room, continue, follow cultures and sensitivities
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
## 2022-01-22 NOTE — ANESTHESIA TIME REPORT
Anesthesia Start and Stop Event Times     Date Time Event    1/22/2022 1249 Ready for Procedure     1310 Anesthesia Start     1343 Anesthesia Stop        Responsible Staff  01/22/22    Name Role Begin End    Shiraz Ortiz III, M.D. Anesth 1310 1343        Preop Diagnosis (Free Text):  Pre-op Diagnosis     Non-Healing Left Lower Extremity Wound        Preop Diagnosis (Codes):    Premium Reason  E. Weekend    Comments: Emergency case

## 2022-01-22 NOTE — ASSESSMENT & PLAN NOTE
Imaging shows evidence for heterogeneous appearance of the cortex along the anterior aspect of the tibia concerning for osteomyelitis.  Ortho at [facility] [[facility]] consulted recommended admitting to regional for multi disciplinary team including Ortho [Dr Claros, contacted by EDP] and plastics [will need to be consulted]  Patient will be admitted directly to regional  Started on Zosyn and vancomycin in the emergency room, continue, follow cultures and sensitivities
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
## 2022-01-22 NOTE — ASSESSMENT & PLAN NOTE
Wound care on board   Needs plastics surgery evaluation  Plastic eval yesterday, continue wound care.

## 2022-01-22 NOTE — PROGRESS NOTES
"Pharmacy Vancomycin Kinetics Note for 1/21/2022     71 y.o. male on Vancomycin day # 1     Vancomycin Indication (AUC Dosing): Osteomyelitis        Provider specified end date: 01/26/22    Active Antibiotics (From admission, onward)    Ordered     Ordering Provider       Fri Jan 21, 2022  4:24 PM    01/21/22 1624  MD Alert...Vancomycin per Pharmacy  PHARMACY TO DOSE        Question:  Indication(s) for vancomycin?  Answer:  Osteomyelitis    Thomas Dozier M.D.    01/21/22 1624  piperacillin-tazobactam (ZOSYN) 4.5 g in  mL IVPB  (piperacillin-tazobactam (ZOSYN) IV (Extended-infusion) PANEL )  EVERY 8 HOURS        \"And\" Linked Group Details    Thomas Dozier M.D.       Fri Jan 21, 2022  3:11 PM    01/21/22 1511  vancomycin 2500 mg/500mL NS IVPB premix  (vancomycin (VANCOCIN) IV (LD + Maintenance))  ONCE         Eric Cancino M.D.          Dosing Weight: 102 kg (224 lb 13.9 oz)      Admission History: Admitted on 1/21/2022 for No admission diagnoses are documented for this encounter.  Pertinent history: 70 yo M started on antibiotics for osteomyelitis    Allergies:     Sulfa drugs     Pertinent cultures to date:     Results     Procedure Component Value Units Date/Time    MRSA By PCR (Amp) [[id number]] Collected: 01/21/22 1641    Order Status: Completed Specimen: Respirate from Nares Updated: 01/21/22 1711    Narrative:      Per P&T Lab Protocol    BLOOD CULTURE [[id number]] Collected: 01/21/22 1524    Order Status: Completed Specimen: Blood from Peripheral Updated: 01/21/22 1532    Narrative:      Per Hospital Policy: Only change Specimen Src: to \"Line\" if  specified by physician order.    BLOOD CULTURE [[id number]] Collected: 01/21/22 1518    Order Status: Completed Specimen: Blood from Peripheral Updated: 01/21/22 1527    Narrative:      Per Hospital Policy: Only change Specimen Src: to \"Line\" if  specified by physician order.    CULTURE WOUND W/ GRAM STAIN [[id number]] Collected: 01/21/22 1410    Order " "Status: Completed Specimen: Wound from Left Leg Updated: 22 1429          Labs:     Estimated Creatinine Clearance: 132.9 mL/min (by C-G formula based on SCr of 0.61 mg/dL).  Recent Labs     22  1518   WBC 7.4   NEUTSPOLYS 62.00     Recent Labs     22  1518   BUN 12   CREATININE 0.61   ALBUMIN 4.1     No intake or output data in the 24 hours ending 22 1739   /72   Pulse (!) 49   Temp 37.1 °C (98.7 °F) (Temporal)   Resp 16   Ht 1.778 m (5' 10\")   Wt 102 kg (225 lb)   SpO2 96%  Temp (24hrs), Av.1 °C (98.7 °F), Min:37.1 °C (98.7 °F), Max:37.1 °C (98.7 °F)      List concerns for Vancomycin clearance:     Age;Obesity    Pharmacokinetics:     AUC kinetics:   Ke (hr ^-1): 0.114 hr^-1  Half life: 6.08 hr  Clearance: 5.266  Estimated TDD: 2633  Estimated Dose: 889  Estimated interval: 8.1        A/P:     -  Vancomycin dose: vancomycin 2500 mg IV loading dose administered on 22 at 1632 as 3 hour infusion followed by vancomycin 1250 mg IV every 12 hours     -  Next vancomycin level(s): defer     -  Predicted vancomycin AUC from initial AUC test calculator: 475 mg·hr/L    -  Comments: MRSA nares ordered    Britni Croft, PharmD  "

## 2022-01-22 NOTE — THERAPY
Physical Therapy Contact Note    PT consult received, chart reviewed; pt is a chronic quad that lives at [facility]; he receives 24/7 support and is a dependent brigid lift to a power w/c and 'never' sits on EOB for balance training as confirmed 10/2021; per chart review received home health PT/OT for home safety assessment/caregiver training with brigid lift 10/2021; no role for acute PT identified as issues are chronic in nature, recommend discharge back to [facility] when medically appropriate to do so. No formal eval performed.    Maude CONNORS, PT,  [phone]

## 2022-01-22 NOTE — PROGRESS NOTES
Received report from night shift RNDottie . Assumed care of pt at 0645. Pt reports no pain, nausea, vomiting, numbness, tingling, at this time. AOx4. Updated pt on plan of care. Pt resting comfortably in bed. Fall precautions and education done. Educated on use of call light which is placed nearby patient. Hourly rounding and continuous pulse ox monitoring in place, O2 saturation at 94% on RA. Questions and concerns answered at this time. Patient reports no other needs at the moment. Patient refusing medications until 0900.

## 2022-01-22 NOTE — PROGRESS NOTES
Pt awake and oriented x 4. No acute distress noted. No complaints of pain or discomfort at this time. Maintained fall and skin precautions. Pt refused q2 hour turns for shift. Patient requested not to be bothered between 6:30 am-8 am. All needs met at this time. Will observe for changes.

## 2022-01-22 NOTE — ED NOTES
Report called to ZAIRA Pierre. Pt to be transferred to [facility] S174-2.

## 2022-01-22 NOTE — ANESTHESIA POSTPROCEDURE EVALUATION
Patient: Osvaldo Pate    Procedure Summary     Date: 01/22/22 Room / Location: [place] [address] / SURGERY [facility]    Anesthesia Start: 1310 Anesthesia Stop: 1343    Procedure: INCISION AND DRAINAGE, WOUND, BY ORTHOPEDICS (Leg Lower) Diagnosis: (Non-Healing Left Lower Extremity Wound)    Surgeons: Erasmo Stewart M.D. Responsible Provider: Shiraz Ortiz III, M.D.    Anesthesia Type: MAC ASA Status: 3 - Emergent          Final Anesthesia Type: MAC  Last vitals  BP   Blood Pressure : 122/60    Temp   36.8 °C (98.3 °F)    Pulse   (!) 51   Resp   20    SpO2   96 %      Anesthesia Post Evaluation    Patient location during evaluation: PACU  Patient participation: complete - patient participated  Level of consciousness: awake and alert  Pain score: 0    Airway patency: patent  Anesthetic complications: no  Cardiovascular status: hemodynamically stable  Respiratory status: acceptable  Hydration status: euvolemic    PONV: none          No complications documented.     Nurse Pain Score: 0 (NPRS)

## 2022-01-22 NOTE — ASSESSMENT & PLAN NOTE
C/o unhealing left lower extremity wound  Imaging shows evidence for heterogeneous appearance of the cortex along the anterior aspect of the tibia concerning for osteomyelitis.  Ortho at [facility] [[facility]] consulted- recommended admitting to regional for multi disciplinary team including Ortho and plastics   Per orthopedic surgery - s/p I & D left L/E wound on 1/22  IV Zosyn and vancomycin for now per ID   ID recommending bone biopsy. Left ankle.   S/p Bone biopsy on 1/27/22 positive for Proteus mirabilis ESBL  Iv abx until 2/4/22 per ID, antibiotics switched to  meropenem.

## 2022-01-23 LAB
ALBUMIN SERPL BCP-MCNC: 3.6 G/DL (ref 3.2–4.9)
ALBUMIN/GLOB SERPL: 1.4 G/DL
ALP SERPL-CCNC: 64 U/L (ref 30–99)
ALT SERPL-CCNC: 8 U/L (ref 2–50)
ANION GAP SERPL CALC-SCNC: 10 MMOL/L (ref 7–16)
AST SERPL-CCNC: 10 U/L (ref 12–45)
BASOPHILS # BLD AUTO: 0.3 % (ref 0–1.8)
BASOPHILS # BLD: 0.02 K/UL (ref 0–0.12)
BILIRUB SERPL-MCNC: 0.5 MG/DL (ref 0.1–1.5)
BUN SERPL-MCNC: 11 MG/DL (ref 8–22)
CALCIUM SERPL-MCNC: 8.7 MG/DL (ref 8.5–10.5)
CHLORIDE SERPL-SCNC: 109 MMOL/L (ref 96–112)
CO2 SERPL-SCNC: 22 MMOL/L (ref 20–33)
CREAT SERPL-MCNC: 0.53 MG/DL (ref 0.5–1.4)
EOSINOPHIL # BLD AUTO: 0.21 K/UL (ref 0–0.51)
EOSINOPHIL NFR BLD: 3.1 % (ref 0–6.9)
ERYTHROCYTE [DISTWIDTH] IN BLOOD BY AUTOMATED COUNT: 52.9 FL (ref 35.9–50)
GLOBULIN SER CALC-MCNC: 2.6 G/DL (ref 1.9–3.5)
GLUCOSE SERPL-MCNC: 99 MG/DL (ref 65–99)
GRAM STN SPEC: NORMAL
HCT VFR BLD AUTO: 37.3 % (ref 42–52)
HGB BLD-MCNC: 12.2 G/DL (ref 14–18)
IMM GRANULOCYTES # BLD AUTO: 0.04 K/UL (ref 0–0.11)
IMM GRANULOCYTES NFR BLD AUTO: 0.6 % (ref 0–0.9)
LYMPHOCYTES # BLD AUTO: 1.23 K/UL (ref 1–4.8)
LYMPHOCYTES NFR BLD: 18.3 % (ref 22–41)
MCH RBC QN AUTO: 33.9 PG (ref 27–33)
MCHC RBC AUTO-ENTMCNC: 32.7 G/DL (ref 33.7–35.3)
MCV RBC AUTO: 103.6 FL (ref 81.4–97.8)
MONOCYTES # BLD AUTO: 0.66 K/UL (ref 0–0.85)
MONOCYTES NFR BLD AUTO: 9.8 % (ref 0–13.4)
NEUTROPHILS # BLD AUTO: 4.57 K/UL (ref 1.82–7.42)
NEUTROPHILS NFR BLD: 67.9 % (ref 44–72)
NRBC # BLD AUTO: 0 K/UL
NRBC BLD-RTO: 0 /100 WBC
PLATELET # BLD AUTO: 152 K/UL (ref 164–446)
PMV BLD AUTO: 8.8 FL (ref 9–12.9)
POTASSIUM SERPL-SCNC: 3.8 MMOL/L (ref 3.6–5.5)
PROT SERPL-MCNC: 6.2 G/DL (ref 6–8.2)
RBC # BLD AUTO: 3.6 M/UL (ref 4.7–6.1)
SIGNIFICANT IND 70042: NORMAL
SITE SITE: NORMAL
SODIUM SERPL-SCNC: 141 MMOL/L (ref 135–145)
SOURCE SOURCE: NORMAL
VANCOMYCIN PEAK SERPL-MCNC: 36 UG/ML (ref 20–40)
WBC # BLD AUTO: 6.7 K/UL (ref 4.8–10.8)

## 2022-01-23 PROCEDURE — 700102 HCHG RX REV CODE 250 W/ 637 OVERRIDE(OP): Performed by: STUDENT IN AN ORGANIZED HEALTH CARE EDUCATION/TRAINING PROGRAM

## 2022-01-23 PROCEDURE — 99223 1ST HOSP IP/OBS HIGH 75: CPT | Performed by: INTERNAL MEDICINE

## 2022-01-23 PROCEDURE — A9270 NON-COVERED ITEM OR SERVICE: HCPCS | Performed by: STUDENT IN AN ORGANIZED HEALTH CARE EDUCATION/TRAINING PROGRAM

## 2022-01-23 PROCEDURE — 770001 HCHG ROOM/CARE - MED/SURG/GYN PRIV*

## 2022-01-23 PROCEDURE — 99024 POSTOP FOLLOW-UP VISIT: CPT | Performed by: ORTHOPAEDIC SURGERY

## 2022-01-23 PROCEDURE — 700105 HCHG RX REV CODE 258: Performed by: STUDENT IN AN ORGANIZED HEALTH CARE EDUCATION/TRAINING PROGRAM

## 2022-01-23 PROCEDURE — 80053 COMPREHEN METABOLIC PANEL: CPT

## 2022-01-23 PROCEDURE — 85025 COMPLETE CBC W/AUTO DIFF WBC: CPT

## 2022-01-23 PROCEDURE — 700111 HCHG RX REV CODE 636 W/ 250 OVERRIDE (IP): Performed by: STUDENT IN AN ORGANIZED HEALTH CARE EDUCATION/TRAINING PROGRAM

## 2022-01-23 PROCEDURE — 80202 ASSAY OF VANCOMYCIN: CPT

## 2022-01-23 PROCEDURE — 99233 SBSQ HOSP IP/OBS HIGH 50: CPT | Performed by: STUDENT IN AN ORGANIZED HEALTH CARE EDUCATION/TRAINING PROGRAM

## 2022-01-23 PROCEDURE — 36415 COLL VENOUS BLD VENIPUNCTURE: CPT

## 2022-01-23 RX ADMIN — PIPERACILLIN AND TAZOBACTAM 4.5 G: 4; .5 INJECTION, POWDER, LYOPHILIZED, FOR SOLUTION INTRAVENOUS; PARENTERAL at 06:05

## 2022-01-23 RX ADMIN — VANCOMYCIN HYDROCHLORIDE 1250 MG: 5 INJECTION, POWDER, LYOPHILIZED, FOR SOLUTION INTRAVENOUS at 17:51

## 2022-01-23 RX ADMIN — PIPERACILLIN AND TAZOBACTAM 4.5 G: 4; .5 INJECTION, POWDER, LYOPHILIZED, FOR SOLUTION INTRAVENOUS; PARENTERAL at 13:15

## 2022-01-23 RX ADMIN — BACLOFEN 20 MG: 10 TABLET ORAL at 21:35

## 2022-01-23 RX ADMIN — BACLOFEN 20 MG: 10 TABLET ORAL at 13:15

## 2022-01-23 RX ADMIN — BACLOFEN 20 MG: 10 TABLET ORAL at 09:21

## 2022-01-23 RX ADMIN — SODIUM CHLORIDE, POTASSIUM CHLORIDE, SODIUM LACTATE AND CALCIUM CHLORIDE: 600; 310; 30; 20 INJECTION, SOLUTION INTRAVENOUS at 20:12

## 2022-01-23 RX ADMIN — DOCUSATE SODIUM 200 MG: 100 CAPSULE ORAL at 21:30

## 2022-01-23 RX ADMIN — Medication 10 MG: at 21:30

## 2022-01-23 RX ADMIN — PIPERACILLIN AND TAZOBACTAM 4.5 G: 4; .5 INJECTION, POWDER, LYOPHILIZED, FOR SOLUTION INTRAVENOUS; PARENTERAL at 21:28

## 2022-01-23 RX ADMIN — BACLOFEN 20 MG: 10 TABLET ORAL at 17:51

## 2022-01-23 RX ADMIN — SENNOSIDES 17.2 MG: 8.6 TABLET, FILM COATED ORAL at 21:30

## 2022-01-23 RX ADMIN — SENNOSIDES 17.2 MG: 8.6 TABLET, FILM COATED ORAL at 09:22

## 2022-01-23 RX ADMIN — VANCOMYCIN HYDROCHLORIDE 1250 MG: 5 INJECTION, POWDER, LYOPHILIZED, FOR SOLUTION INTRAVENOUS at 04:01

## 2022-01-23 RX ADMIN — DOCUSATE SODIUM 200 MG: 100 CAPSULE ORAL at 09:22

## 2022-01-23 RX ADMIN — Medication 400 MG: at 06:05

## 2022-01-23 RX ADMIN — ENOXAPARIN SODIUM 40 MG: 40 INJECTION SUBCUTANEOUS at 13:15

## 2022-01-23 ASSESSMENT — ENCOUNTER SYMPTOMS
WHEEZING: 0
DOUBLE VISION: 0
SHORTNESS OF BREATH: 0
DIARRHEA: 0
CHILLS: 0
WEAKNESS: 1
NERVOUS/ANXIOUS: 0
FEVER: 0
SORE THROAT: 0
SPUTUM PRODUCTION: 0
HEADACHES: 0
SINUS PAIN: 0
CONSTIPATION: 0
FOCAL WEAKNESS: 0
PALPITATIONS: 0
DIZZINESS: 0
COUGH: 0
MYALGIAS: 0
BLURRED VISION: 0
VOMITING: 0
ABDOMINAL PAIN: 0
NAUSEA: 0

## 2022-01-23 ASSESSMENT — PAIN DESCRIPTION - PAIN TYPE: TYPE: ACUTE PAIN

## 2022-01-23 ASSESSMENT — FIBROSIS 4 INDEX: FIB4 SCORE: 1.65

## 2022-01-23 NOTE — PROGRESS NOTES
Pt awake and oriented x 4. No acute distress noted. No complaints or s/s of pain or discomfort at this time. Maintained fall and skin precautions. Bed alarm on. All needs met at this time. Will observe for any changes.
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
## 2022-01-23 NOTE — CONSULTS
[facility] INFECTIOUS DISEASES INPATIENT CONSULT NOTE     Date of Service: 1/23/2022    Consult Requested By: Monty Vega M.D.    Reason for Consultation: Osteomyelitis    Chief Complaint: Leg wound    History of Present Illness:     Osvaldo Pate is a 71 y.o. male admitted 1/21/2022.  Patient with known C5-7 paraplegia, neurogenic bladder with suprapubic catheter, history of stage IV sacral decubitus ulcer complicated by sacral osteomyelitis with abscess, completed therapy in 2019, decubitus ulcers, recent admission in December 2021 with ureteral stone and hydronephrosis, left ureteral stent placed, with plan for lithotripsy later.    Patient presented to the ER on 1/21 with nonhealing wound over the left ankle.  Patient states that he has had this for the prior 3 years but noted recent worsening.  Home nurse noted that bone is exposed and this was noted on admitting documentation as well.  Patient with no sensation so unable to describe pain.  He noted no fevers or chills.  Patient afebrile and without leukocytosis, was started on vancomycin and Zosyn.  The wound was swabbed and this is growing Proteus and MRSA.  X-ray of the ankle with healed fracture deformity of the distal tibia and fibula, heterogenous appearance of the cortex, possible osteomyelitis.  Patient was taken to the OR on 1/22 by Ortho, underwent I&D down to the bone.  No cultures or pathology were obtained.  ID consulted for recommendations.    Review of Systems:  All other systems reviewed and are negative expect as noted in HPI    Past Medical History:   Diagnosis Date   • A-fib ([facility])    • Atrial flutter ([facility])    • Blood clotting disorder ([facility])     Patient is on Xarelto   • Bowel habit changes     Colostomy   • GERD (gastroesophageal reflux disease)    • Hypertension    • Neurogenic bladder    • Quadriplegia, C5-C7 complete ([facility])        Past Surgical History:   Procedure Laterality Date   • PA CYSTOSCOPY,INSERT URETERAL STENT Left 1/4/2022     Procedure: CYSTOSCOPY, WITH URETERAL STENT INSERTION;  Surgeon: Osvaldo Baltazar M.D.;  Location: [facility];  Service: Urology   • AL CYSTO/URETERO/PYELOSCOPY, DX Left 1/4/2022    Procedure: URETEROSCOPY;  Surgeon: Osvaldo Baltazar M.D.;  Location: [facility];  Service: Urology   • LASERTRIPSY N/A 1/4/2022    Procedure: LITHOTRIPSY, USING LASER;  Surgeon: Osvaldo Baltazar M.D.;  Location: [facility];  Service: Urology   • AL CYSTOSCOPY,INSERT URETERAL STENT Left 12/16/2021    Procedure: CYSTOSCOPY, WITH URETERAL STENT INSERTION;  Surgeon: Aly Bwoen M.D.;  Location: [facility];  Service: Urology   • AL CYSTO/URETERO/PYELOSCOPY, DX Left 12/16/2021    Procedure: URETEROSCOPY;  Surgeon: Aly Bowen M.D.;  Location: [facility];  Service: Urology   • LASERTRIPSY Left 12/16/2021    Procedure: LITHOTRIPSY, USING LASER;  Surgeon: Aly Bowen M.D.;  Location: [facility];  Service: Urology   • IRRIGATION & DEBRIDEMENT GENERAL  12/20/2020    Procedure: IRRIGATION AND DEBRIDEMENT, WOUND SACRAL ULCER;  Surgeon: Matt Cummins M.D.;  Location: [facility];  Service: Plastics   • ULCER DEBRIDEMENT N/A 8/21/2019    Procedure: debridement of Sacral grade 4 ulcer - W/BONE BIOPSY, 3 liter wash out. bilateral sliding gluteal myocutaneous flap advancement;  Surgeon: Amadeo Moon M.D.;  Location: [facility];  Service: Plastics   • FLAP CLOSURE  8/21/2019    Procedure: CLOSURE, FLAP - MUSCLE;  Surgeon: Amadeo Moon M.D.;  Location: [facility];  Service: Plastics   • COLOSTOMY N/A 7/27/2019    Procedure: CREATION, COLOSTOMY -  placement;  Surgeon: Elías Hannah M.D.;  Location: [facility];  Service: General   • COLOSTOMY     • COLOSTOMY TAKEDOWN     • HERNIA REPAIR     • PERCUTANEOUOSPINNING LOWER EXTREMITY         Family History   Problem Relation Age of Onset   • Heart Disease  Father        Social History     Socioeconomic History   • Marital status:      Spouse name: Not on file   • Number of children: Not on file   • Years of education: Not on file   • Highest education level: Not on file   Occupational History   • Not on file   Tobacco Use   • Smoking status: Former Smoker     Packs/day: 1.00     Types: Cigarettes     Start date: 1967     Quit date: 1977     Years since quittin.0   • Smokeless tobacco: Never Used   Vaping Use   • Vaping Use: Never used   Substance and Sexual Activity   • Alcohol use: Yes     Alcohol/week: 0.6 oz     Types: 1 Standard drinks or equivalent per week     Comment: nightly   • Drug use: No   • Sexual activity: Not on file   Other Topics Concern   • Not on file   Social History Narrative   • Not on file     Social Determinants of Health     Financial Resource Strain:    • Difficulty of Paying Living Expenses: Not on file   Food Insecurity:    • Worried About Running Out of Food in the Last Year: Not on file   • Ran Out of Food in the Last Year: Not on file   Transportation Needs:    • Lack of Transportation (Medical): Not on file   • Lack of Transportation (Non-Medical): Not on file   Physical Activity:    • Days of Exercise per Week: Not on file   • Minutes of Exercise per Session: Not on file   Stress:    • Feeling of Stress : Not on file   Social Connections:    • Frequency of Communication with Friends and Family: Not on file   • Frequency of Social Gatherings with Friends and Family: Not on file   • Attends [employer] Services: Not on file   • Active Member of Clubs or Organizations: Not on file   • Attends Club or Organization Meetings: Not on file   • Marital Status: Not on file   Intimate Partner Violence:    • Fear of Current or Ex-Partner: Not on file   • Emotionally Abused: Not on file   • Physically Abused: Not on file   • Sexually Abused: Not on file   Housing Stability:    • Unable to Pay for Housing in the Last Year: Not on  "file   • Number of Places Lived in the Last Year: Not on file   • Unstable Housing in the Last Year: Not on file       Allergies   Allergen Reactions   • Sulfa Drugs Rash     Rash, developed this back in 2015 after being placed on \"sulfa antibiotic for my wound\". Antibiotic was stopped and rash went away. Patient states he had a sulfa antibiotic prior to that time back when he was younger w/o a reaction.          Medications:    Current Facility-Administered Medications:   •  amLODIPine (NORVASC) tablet 10 mg, 10 mg, Oral, DAILY, Monty Vega M.D.  •  docusate sodium (COLACE) capsule 200 mg, 200 mg, Oral, BID, Monty Vega M.D., 200 mg at 01/23/22 0922  •  polyethylene glycol/lytes (MIRALAX) PACKET 1 Packet, 1 Packet, Oral, DAILY, Monty Vega M.D.  •  sennosides (SENOKOT) 8.6 MG tablet 17.2 mg, 17.2 mg, Oral, BID, Monty Vega M.D., 17.2 mg at 01/23/22 0922  •  melatonin tablet 10 mg, 10 mg, Oral, QHS, Monty Vega M.D., 10 mg at 01/22/22 2046  •  oxyCODONE immediate-release (ROXICODONE) tablet 5 mg, 5 mg, Oral, BID PRN, Monty Vega M.D.  •  magnesium oxide (MAG-OX) tablet 400 mg, 400 mg, Oral, DAILY, Monty Vega M.D., 400 mg at 01/23/22 0605  •  baclofen (LIORESAL) tablet 20 mg, 20 mg, Oral, 4X/DAY, Monty Vega M.D., 20 mg at 01/23/22 0921  •  losartan (COZAAR) tablet 50 mg, 50 mg, Oral, DAILY, Ileana Street M.D.  •  MD Alert...Vancomycin per Pharmacy, , Other, PHARMACY TO DOSE, Ileana Street M.D.  •  piperacillin-tazobactam (ZOSYN) 4.5 g in  mL IVPB, 4.5 g, Intravenous, Q8HRS, Ileana Street M.D., Stopped at 01/23/22 1005  •  vancomycin (VANCOCIN) 1,250 mg in  mL IVPB, 1,250 mg, Intravenous, Q12HR, Ileana Street M.D., Stopped at 01/23/22 0601  •  acetaminophen (Tylenol) tablet 650 mg, 650 mg, Oral, Q6HRS PRN, Ileana Street M.D.  •  lactated ringers infusion, , Intravenous, Continuous, Ileana Street M.D., Last Rate: 83 mL/hr at 01/21/22 6639, Restarted at 01/22/22 1310  •  " ondansetron (ZOFRAN) syringe/vial injection 4 mg, 4 mg, Intravenous, Q4HRS PRN, Ileana Street M.D.  •  ondansetron (ZOFRAN ODT) dispertab 4 mg, 4 mg, Oral, Q4HRS PRN, Ileana Street M.D.  •  labetalol (NORMODYNE/TRANDATE) injection 10 mg, 10 mg, Intravenous, Q4HRS PRN, Ileana Street M.D.  •  Notify provider if pain remains uncontrolled, , , CONTINUOUS **AND** Use the Numeric Rating Scale (NRS), Garcia-Baker Faces (WBF), or FLACC on regular floors and Critical-Care Pain Observation Tool (CPOT) on ICUs/Trauma to assess pain, , , CONTINUOUS **AND** Pulse Ox, , , CONTINUOUS **AND** Pharmacy Consult Request ...Pain Management Review 1 Each, 1 Each, Other, PHARMACY TO DOSE **AND** If patient difficult to arouse and/or has respiratory depression (respiratory rate of 10 or less), stop any opiates that are currently infusing and call a Rapid Response., , , CONTINUOUS, Ileana Street M.D.  •  [DISCONTINUED] oxyCODONE immediate-release (ROXICODONE) tablet 5 mg, 5 mg, Oral, Q3HRS PRN **OR** oxyCODONE immediate release (ROXICODONE) tablet 10 mg, 10 mg, Oral, Q3HRS PRN **OR** HYDROmorphone (Dilaudid) injection 0.5 mg, 0.5 mg, Intravenous, Q3HRS PRN, Ileana Street M.D.    Physical Exam:   Vital Signs: /87   Pulse (!) 54 Comment: Rn notified   Temp 36.1 °C (97 °F) (Temporal)   Resp 18   Wt 105 kg (231 lb 11.3 oz)   SpO2 96%   BMI 33.25 kg/m²   Temp  Av.4 °C (97.6 °F)  Min: 35.7 °C (96.3 °F)  Max: 37 °C (98.6 °F)  Vital signs reviewed  Constitutional: Patient is oriented to person, place, and time. Appears well-developed and well-nourished. No distress  Head: Atraumatic, normocephalic  Eyes: Conjunctivae normal, EOM intac  Mouth/Throat: Lips without lesions  Neck: Neck supple. No masses/lymphadenopathy  Cardiovascular: Normal rate, regular rhythm, normal S1S2 and intact distal pulses. No murmur, gallop, or friction rub. No pedal edema.  Pulmonary/Chest: No respiratory distress. Unlabored respiratory  effort, lungs clear to auscultation. No wheezes or rales.   Abdominal: Soft, non tender. BS + x 4. No masses or hepatosplenomegaly.   Musculoskeletal: Left leg with clean dry surgical dressing in place.  Wound care pictures from 1/21 reviewed  Neurological: Alert and oriented to person, place, and time. No gross cranial nerve deficit.  Paraplegic  Skin: As above  Psychiatric: Normal mood and affect. Behavior is normal.     LABS:  Recent Labs     01/21/22  1518 01/22/22  0210 01/23/22  0316   WBC 7.4 6.8 6.7      Recent Labs     01/21/22  1518 01/22/22  0210 01/23/22  0316   HEMOGLOBIN 13.0* 12.6* 12.2*   HEMATOCRIT 39.2* 38.8* 37.3*   .8* 104.6* 103.6*   MCH 33.8* 34.0* 33.9*   PLATELETCT 169 172 152*       Recent Labs     01/21/22  1518 01/22/22  0210 01/23/22  0316   SODIUM 140 143 141   POTASSIUM 4.7 5.0 3.8   CHLORIDE 104 108 109   CO2 25 25 22   CREATININE 0.61 0.82 0.53        Recent Labs     01/21/22  1518 01/22/22  0210 01/23/22  0316   ALBUMIN 4.1 3.7 3.6        MICRO:  No results found for: BLOODCULTU, BLDCULT, BCHOLD     Latest pertinent labs were reviewed    IMAGING STUDIES:  As above    Hospital Course/Assessment:   Osvaldo Pate is a 71 y.o. male with known C5-7 paraplegia, neurogenic bladder with suprapubic catheter, history of stage IV sacral decubitus ulcer complicated by sacral osteomyelitis with abscess, completed therapy in 2019, decubitus ulcers, recent admission in December 2021 with ureteral stone and hydronephrosis, left ureteral stent placed, with plan for lithotripsy later. Patient presented to the ER on 1/21 with nonhealing wound over the left ankle x 3 years with recent worsening, bone was noted to be exposed. The wound was swabbed and this is growing Proteus and MRSA.  X-ray of the ankle with healed fracture deformity of the distal tibia and fibula, heterogenous appearance of the cortex, possible osteomyelitis.  Patient was taken to the OR on 1/22 by Ortho, underwent I&D  down to the bone.  No cultures or pathology were obtained.  ID consulted for recommendations.    Given chronic wound with exposed bone and positive wound swab, will treat with a prolonged course of antibiotics for osteomyelitis    Pertinent Diagnoses:  Left leg osteomyelitis  Polymicrobial infection  C5-C7 paraplegia  Chronic decubitus ulcers  History of sacral osteomyelitis with abscess    Plan:   -Okay to continue IV vancomycin and Zosyn for now.  Antibiotic choice depending on sensitivity pattern    Plan was discussed with the primary team, Dr. Vega    ID will follow. Please feel free to call with questions.    Manuel Valencia M.D.    Please note that this dictation was created using voice recognition software. I have worked with technical experts from [facility] to optimize the interface.  I have made every reasonable attempt to correct obvious errors, but there may be errors of grammar and possibly content that I did not discover before finalizing the note.

## 2022-01-23 NOTE — PROGRESS NOTES
Received report from night shift RNPankaj . Assumed care of pt at 0645. Pt reports no pain, nausea, vomiting, numbness, tingling, at this time. AOx4. Updated pt on plan of care. Pt resting comfortably in bed. Fall precautions and education done. Educated on use of call light which is placed nearby patient. Hourly rounding in place. Questions and concerns answered at this time. Patient reports no other needs at the moment.

## 2022-01-23 NOTE — PROGRESS NOTES
/76   Pulse (!) 48   Temp 36.4 °C (97.5 °F) (Temporal)   Resp 18   Wt 105 kg (231 lb 11.3 oz)   SpO2 96%     Recent Labs     01/21/22  1518 01/22/22  0210 01/23/22  0316   WBC 7.4 6.8 6.7   RBC 3.85* 3.71* 3.60*   HEMOGLOBIN 13.0* 12.6* 12.2*   HEMATOCRIT 39.2* 38.8* 37.3*   .8* 104.6* 103.6*   MCH 33.8* 34.0* 33.9*   MCHC 33.2* 32.5* 32.7*   RDW 51.8* 53.6* 52.9*   PLATELETCT 169 172 152*   MPV 9.4 9.0 8.8*       POD#1  S/P I&D foot    Plan:  DVT Prophylaxis- TEDS/SCDs  Weight Bearing Status-WBAT  PT/OT  Antibiotics: per ID  Case Coordination

## 2022-01-23 NOTE — CARE PLAN
The patient is Watcher - Medium risk of patient condition declining or worsening    Shift Goals  Clinical Goals: wound care  Patient Goals: rest    Progress made toward(s) clinical / shift goals:    Problem: Knowledge Deficit - Standard  Goal: Patient and family/care givers will demonstrate understanding of plan of care, disease process/condition, diagnostic tests and medications  Outcome: Progressing     Problem: Skin Integrity  Goal: Skin integrity is maintained or improved  Outcome: Progressing     Problem: Fall Risk  Goal: Patient will remain free from falls  Outcome: Progressing       Patient is not progressing towards the following goals:

## 2022-01-23 NOTE — CARE PLAN
The patient is Stable - Low risk of patient condition declining or worsening    Shift Goals  Clinical Goals: rest and comfort  Patient Goals: rest    Progress made toward(s) clinical / shift goals:    Problem: Knowledge Deficit - Standard  Goal: Patient and family/care givers will demonstrate understanding of plan of care, disease process/condition, diagnostic tests and medications  Outcome: Progressing     Problem: Skin Integrity  Goal: Skin integrity is maintained or improved  Outcome: Progressing     Problem: Fall Risk  Goal: Patient will remain free from falls  Outcome: Progressing

## 2022-01-24 LAB
ALBUMIN SERPL BCP-MCNC: 3.6 G/DL (ref 3.2–4.9)
ALBUMIN/GLOB SERPL: 1.4 G/DL
ALP SERPL-CCNC: 62 U/L (ref 30–99)
ALT SERPL-CCNC: 6 U/L (ref 2–50)
ANION GAP SERPL CALC-SCNC: 10 MMOL/L (ref 7–16)
AST SERPL-CCNC: 10 U/L (ref 12–45)
BACTERIA WND AEROBE CULT: ABNORMAL
BILIRUB SERPL-MCNC: 0.3 MG/DL (ref 0.1–1.5)
BUN SERPL-MCNC: 7 MG/DL (ref 8–22)
CALCIUM SERPL-MCNC: 8.8 MG/DL (ref 8.5–10.5)
CHLORIDE SERPL-SCNC: 112 MMOL/L (ref 96–112)
CO2 SERPL-SCNC: 23 MMOL/L (ref 20–33)
CREAT SERPL-MCNC: 0.6 MG/DL (ref 0.5–1.4)
GLOBULIN SER CALC-MCNC: 2.6 G/DL (ref 1.9–3.5)
GLUCOSE SERPL-MCNC: 119 MG/DL (ref 65–99)
GRAM STN SPEC: ABNORMAL
POTASSIUM SERPL-SCNC: 4 MMOL/L (ref 3.6–5.5)
PROT SERPL-MCNC: 6.2 G/DL (ref 6–8.2)
SIGNIFICANT IND 70042: ABNORMAL
SITE SITE: ABNORMAL
SODIUM SERPL-SCNC: 145 MMOL/L (ref 135–145)
SOURCE SOURCE: ABNORMAL
VANCOMYCIN TROUGH SERPL-MCNC: 17 UG/ML (ref 10–20)

## 2022-01-24 PROCEDURE — 99233 SBSQ HOSP IP/OBS HIGH 50: CPT | Performed by: STUDENT IN AN ORGANIZED HEALTH CARE EDUCATION/TRAINING PROGRAM

## 2022-01-24 PROCEDURE — 700105 HCHG RX REV CODE 258: Performed by: STUDENT IN AN ORGANIZED HEALTH CARE EDUCATION/TRAINING PROGRAM

## 2022-01-24 PROCEDURE — 80202 ASSAY OF VANCOMYCIN: CPT

## 2022-01-24 PROCEDURE — A9270 NON-COVERED ITEM OR SERVICE: HCPCS | Performed by: STUDENT IN AN ORGANIZED HEALTH CARE EDUCATION/TRAINING PROGRAM

## 2022-01-24 PROCEDURE — 700111 HCHG RX REV CODE 636 W/ 250 OVERRIDE (IP): Performed by: STUDENT IN AN ORGANIZED HEALTH CARE EDUCATION/TRAINING PROGRAM

## 2022-01-24 PROCEDURE — 700102 HCHG RX REV CODE 250 W/ 637 OVERRIDE(OP): Performed by: STUDENT IN AN ORGANIZED HEALTH CARE EDUCATION/TRAINING PROGRAM

## 2022-01-24 PROCEDURE — 770001 HCHG ROOM/CARE - MED/SURG/GYN PRIV*

## 2022-01-24 PROCEDURE — 99024 POSTOP FOLLOW-UP VISIT: CPT | Performed by: ORTHOPAEDIC SURGERY

## 2022-01-24 PROCEDURE — 36415 COLL VENOUS BLD VENIPUNCTURE: CPT

## 2022-01-24 PROCEDURE — 80053 COMPREHEN METABOLIC PANEL: CPT

## 2022-01-24 PROCEDURE — 99233 SBSQ HOSP IP/OBS HIGH 50: CPT | Performed by: INTERNAL MEDICINE

## 2022-01-24 RX ORDER — ASCORBIC ACID 500 MG
1000 TABLET ORAL DAILY
Status: DISCONTINUED | OUTPATIENT
Start: 2022-01-24 | End: 2022-01-31 | Stop reason: HOSPADM

## 2022-01-24 RX ORDER — MULTIVIT WITH MINERALS/LUTEIN
1000 TABLET ORAL DAILY
Status: DISCONTINUED | OUTPATIENT
Start: 2022-01-24 | End: 2022-01-24

## 2022-01-24 RX ORDER — FERROUS SULFATE 325(65) MG
325 TABLET ORAL 2 TIMES DAILY
Status: DISCONTINUED | OUTPATIENT
Start: 2022-01-24 | End: 2022-01-31 | Stop reason: HOSPADM

## 2022-01-24 RX ORDER — CHOLECALCIFEROL (VITAMIN D3) 50 MCG
2000 TABLET ORAL DAILY
Status: DISCONTINUED | OUTPATIENT
Start: 2022-01-24 | End: 2022-01-24

## 2022-01-24 RX ORDER — VITAMIN B COMPLEX
2000 TABLET ORAL DAILY
Status: DISCONTINUED | OUTPATIENT
Start: 2022-01-24 | End: 2022-01-31 | Stop reason: HOSPADM

## 2022-01-24 RX ADMIN — Medication 400 MG: at 04:46

## 2022-01-24 RX ADMIN — BACLOFEN 20 MG: 10 TABLET ORAL at 22:49

## 2022-01-24 RX ADMIN — BACLOFEN 20 MG: 10 TABLET ORAL at 09:48

## 2022-01-24 RX ADMIN — SODIUM CHLORIDE, POTASSIUM CHLORIDE, SODIUM LACTATE AND CALCIUM CHLORIDE: 600; 310; 30; 20 INJECTION, SOLUTION INTRAVENOUS at 10:24

## 2022-01-24 RX ADMIN — PIPERACILLIN AND TAZOBACTAM 4.5 G: 4; .5 INJECTION, POWDER, LYOPHILIZED, FOR SOLUTION INTRAVENOUS; PARENTERAL at 05:35

## 2022-01-24 RX ADMIN — VANCOMYCIN HYDROCHLORIDE 1250 MG: 5 INJECTION, POWDER, LYOPHILIZED, FOR SOLUTION INTRAVENOUS at 04:49

## 2022-01-24 RX ADMIN — OXYCODONE HYDROCHLORIDE AND ACETAMINOPHEN 1000 MG: 500 TABLET ORAL at 13:54

## 2022-01-24 RX ADMIN — Medication 2000 UNITS: at 13:54

## 2022-01-24 RX ADMIN — VANCOMYCIN HYDROCHLORIDE 1250 MG: 5 INJECTION, POWDER, LYOPHILIZED, FOR SOLUTION INTRAVENOUS at 17:26

## 2022-01-24 RX ADMIN — LOSARTAN POTASSIUM 50 MG: 50 TABLET, FILM COATED ORAL at 22:49

## 2022-01-24 RX ADMIN — PIPERACILLIN AND TAZOBACTAM 4.5 G: 4; .5 INJECTION, POWDER, LYOPHILIZED, FOR SOLUTION INTRAVENOUS; PARENTERAL at 13:55

## 2022-01-24 RX ADMIN — DOCUSATE SODIUM 200 MG: 100 CAPSULE ORAL at 22:49

## 2022-01-24 RX ADMIN — FERROUS SULFATE TAB 325 MG (65 MG ELEMENTAL FE) 325 MG: 325 (65 FE) TAB at 13:55

## 2022-01-24 RX ADMIN — BACLOFEN 20 MG: 10 TABLET ORAL at 17:26

## 2022-01-24 RX ADMIN — FERROUS SULFATE TAB 325 MG (65 MG ELEMENTAL FE) 325 MG: 325 (65 FE) TAB at 17:26

## 2022-01-24 RX ADMIN — DOCUSATE SODIUM 200 MG: 100 CAPSULE ORAL at 09:48

## 2022-01-24 RX ADMIN — SENNOSIDES 17.2 MG: 8.6 TABLET, FILM COATED ORAL at 22:48

## 2022-01-24 RX ADMIN — SENNOSIDES 17.2 MG: 8.6 TABLET, FILM COATED ORAL at 09:48

## 2022-01-24 RX ADMIN — Medication 10 MG: at 22:49

## 2022-01-24 RX ADMIN — BACLOFEN 20 MG: 10 TABLET ORAL at 13:55

## 2022-01-24 ASSESSMENT — ENCOUNTER SYMPTOMS
NERVOUS/ANXIOUS: 0
MYALGIAS: 0
HEADACHES: 0
DOUBLE VISION: 0
SHORTNESS OF BREATH: 0
CONSTIPATION: 0
CHILLS: 0
FOCAL WEAKNESS: 1
SENSORY CHANGE: 1
FOCAL WEAKNESS: 0
WHEEZING: 0
COUGH: 0
DIZZINESS: 0
SORE THROAT: 0
PALPITATIONS: 0
ABDOMINAL PAIN: 0
SINUS PAIN: 0
DIARRHEA: 0
BLURRED VISION: 0
NAUSEA: 0
WEAKNESS: 1
VOMITING: 0
FEVER: 0
SPUTUM PRODUCTION: 0

## 2022-01-24 ASSESSMENT — PAIN DESCRIPTION - PAIN TYPE
TYPE: CHRONIC PAIN
TYPE: ACUTE PAIN

## 2022-01-24 NOTE — PROGRESS NOTES
Infectious Disease Progress Note    Author: Rosmery Crabtree M.D. Date & Time of service: 2022  12:33 PM    Chief Complaint:  Clinical OM    Interval History:  71 y.o. male admitted 2022.  Patient with known C5-7 paraplegia, neurogenic bladder with suprapubic catheter, history of stage IV sacral decubitus ulcer complicated by sacral osteomyelitis with abscess, completed therapy in , decubitus ulcers, recent admission in 2021 with ureteral stone and hydronephrosis, left ureteral stent placed, with plan for lithotripsy later.  Patient presented to the ER on  with nonhealing wound over the left ankle with heterotopic bone   AF tolerating abx well-he states waiting to hear if plan for VAC or skin graft-also wants to know if there is a plan for bone biopsy    Labs Reviewed, Medications Reviewed and Wound Reviewed.    Review of Systems:  Review of Systems   Constitutional: Negative for fever.   Respiratory: Negative for cough and shortness of breath.    Gastrointestinal: Negative for nausea and vomiting.   Neurological: Positive for sensory change and focal weakness.   All other systems reviewed and are negative.      Hemodynamics:  Temp (24hrs), Av.3 °C (97.3 °F), Min:36.1 °C (97 °F), Max:36.5 °C (97.7 °F)  Temperature: 36.1 °C (97 °F)  Pulse  Av.2  Min: 41  Max: 69   Blood Pressure : 112/55       Physical Exam:  Physical Exam  Vitals and nursing note reviewed.   Constitutional:       General: He is not in acute distress.     Appearance: He is not ill-appearing, toxic-appearing or diaphoretic.   HENT:      Nose: No rhinorrhea.   Eyes:      General: No scleral icterus.     Extraocular Movements: Extraocular movements intact.      Pupils: Pupils are equal, round, and reactive to light.   Cardiovascular:      Rate and Rhythm: Normal rate.   Pulmonary:      Effort: Pulmonary effort is normal. No respiratory distress.      Breath sounds: No stridor.   Abdominal:      General: There  is no distension.      Palpations: Abdomen is soft.   Musculoskeletal:      Cervical back: Neck supple. No rigidity.      Comments: Foot dressed   Skin:     Coloration: Skin is not jaundiced.      Findings: Bruising present.   Neurological:      Mental Status: He is alert and oriented to person, place, and time.      Cranial Nerves: No cranial nerve deficit.      Sensory: Sensory deficit present.      Motor: Weakness present.      Coordination: Coordination abnormal.      Gait: Gait abnormal.      Deep Tendon Reflexes: Reflexes abnormal.   Psychiatric:         Mood and Affect: Mood normal.         Behavior: Behavior normal.         Meds:    Current Facility-Administered Medications:   •  ferrous sulfate  •  ascorbic acid  •  vitamin D3  •  enoxaparin (LOVENOX) injection  •  amLODIPine  •  docusate sodium  •  polyethylene glycol/lytes  •  sennosides  •  melatonin  •  oxyCODONE immediate-release  •  magnesium oxide  •  baclofen  •  losartan  •  MD Alert...Vancomycin per Pharmacy  •  piperacillin-tazobactam  •  vancomycin  •  acetaminophen  •  LR  •  ondansetron  •  ondansetron  •  labetalol  •  Notify provider if pain remains uncontrolled **AND** Use the Numeric Rating Scale (NRS), Garcia-Baker Faces (WBF), or FLACC on regular floors and Critical-Care Pain Observation Tool (CPOT) on ICUs/Trauma to assess pain **AND** Pulse Ox **AND** Pharmacy Consult Request **AND** If patient difficult to arouse and/or has respiratory depression (respiratory rate of 10 or less), stop any opiates that are currently infusing and call a Rapid Response.  •  [DISCONTINUED] oxyCODONE immediate-release **OR** oxyCODONE immediate-release **OR** HYDROmorphone    Labs:  Recent Labs     01/21/22  1518 01/22/22  0210 01/23/22  0316   WBC 7.4 6.8 6.7   RBC 3.85* 3.71* 3.60*   HEMOGLOBIN 13.0* 12.6* 12.2*   HEMATOCRIT 39.2* 38.8* 37.3*   .8* 104.6* 103.6*   MCH 33.8* 34.0* 33.9*   RDW 51.8* 53.6* 52.9*   PLATELETCT 169 172 152*   MPV 9.4 9.0  8.8*   NEUTSPOLYS 62.00 57.60 67.90   LYMPHOCYTES 25.00 27.50 18.30*   MONOCYTES 8.80 10.20 9.80   EOSINOPHILS 3.20 3.60 3.10   BASOPHILS 0.30 0.40 0.30     Recent Labs     01/22/22  0210 01/23/22  0316 01/24/22  1114   SODIUM 143 141 145   POTASSIUM 5.0 3.8 4.0   CHLORIDE 108 109 112   CO2 25 22 23   GLUCOSE 91 99 119*   BUN 14 11 7*     Recent Labs     01/22/22  0210 01/23/22  0316 01/24/22  1114   ALBUMIN 3.7 3.6 3.6   TBILIRUBIN 0.4 0.5 0.3   ALKPHOSPHAT 72 64 62   TOTPROTEIN 6.9 6.2 6.2   ALTSGPT 9 8 6   ASTSGOT 11* 10* 10*   CREATININE 0.82 0.53 0.60       Imaging:  DX-ANKLE 2- VIEWS LEFT    Result Date: 1/21/2022 1/21/2022 2:58 PM HISTORY/REASON FOR EXAM:  soft tissue wound ? Exposed bone osteo etc. Left ankle wound bone exposed TECHNIQUE/EXAM DESCRIPTION AND NUMBER OF VIEWS:  2 views of the LEFT ankle. COMPARISON: None. FINDINGS: Healed fracture deformity of the distal tibia with extensive surrounding heterotopic ossification. There is heterogeneous appearance of the cortex along the anterior aspect of the tibia Healed fracture deformity of the distal fibula. No joint arthropathy.     Healed fracture deformity of the distal tibia with extensive surrounding heterotopic ossification. Healed fracture deformity of the distal fibula. There is heterogeneous appearance of the cortex along the anterior aspect of the tibia. Osteomyelitis is possible. Further evaluation with MRI can be helpful    DX-CYSTO FLUORO > 1 HOUR    Result Date: 1/4/2022 1/4/2022 6:02 PM HISTORY/REASON FOR EXAM:  Left ureteral stent placement. TECHNIQUE/EXAM DESCRIPTION AND NUMBER OF VIEWS: Cysto fluoroscopy for greater than one hour. FINDINGS: Cysto fluoroscopy was utilized for greater than one hour. Actual fluoro time was 19 seconds. Single frontal view was obtained by the urologist which demonstrates a left ureteral stent.     Cysto fluoroscopy utilized for 19 seconds by the urologist. INTERPRETING LOCATION: [address] [place] [address],  [id number]      Micro:  Results     Procedure Component Value Units Date/Time    CULTURE WOUND W/ GRAM STAIN [[id number]]     Order Status: No result Specimen: Wound from Left Leg           Assessment:  Active Hospital Problems    Diagnosis    • Osteomyelitis of left ankle ([facility]) [M86.9]    • Quadriplegia, C5-C7 complete ([facility]) [G82.53]    • Pressure injury of sacral region, stage 4 ([facility]) [L89.154]    • Essential hypertension [I10]    Left leg osteomyelitis- given chronic wound with exposed bone and positive wound swab, at risk for osteomyelitis   Polymicrobial infection-MRSA ESBL Proteus  C5-C7 paraplegia  Chronic decubitus ulcers incl sacaral  History of sacral osteomyelitis with abscess  S/p OR2x3 cm wound was debrided of a large amount of   heterotopic bone and all edges were debrided back to healthy tissue  Allergic to sulfa    Plan:  Wound swab (unreliable determinant of OM. Also poor indicator of pathogen if OM present except for MRSA) Continue vancomycin for now  Monitor vancomycin trough and renal function closely  Continue Zosyn for now  Obtain bone biopsy if feasible  Wound care  Final antibiotic recommendations per culture results and clinical course

## 2022-01-24 NOTE — PROGRESS NOTES
Pt refused miralax, lovenox, and requested amlodipine be delayed until 0900.  Education provided. Amlodipine due time changed in MAR.

## 2022-01-24 NOTE — CARE PLAN
The patient is Stable - Low risk of patient condition declining or worsening    Shift Goals  Clinical Goals:sleep, ADLs, monitor for hyoptension, q2 turns  Patients goals: Brush teeth, sleep      Problem: Knowledge Deficit - Standard  Goal: Patient and family/care givers will demonstrate understanding of plan of care, disease process/condition, diagnostic tests and medications  Outcome: Progressing     Problem: Fall Risk  Goal: Patient will remain free from falls  Outcome: Progressing

## 2022-01-24 NOTE — CARE PLAN
The patient is Stable - Low risk of patient condition declining or worsening    Shift Goals  Clinical Goals: wound care  Patient Goals: rest    Progress made toward(s) clinical / shift goals:  Range of motion, pain control and maintain comfort level    Patient is not progressing towards the following goals:      Problem: Skin Integrity  Goal: Skin integrity is maintained or improved  Outcome: Not Progressing  Note: Wound team on board with dressing changes and establishing treatment plan for wounds       Problem: Skin Integrity  Goal: Skin integrity is maintained or improved  Outcome: Not Progressing  Note: Wound team on board with dressing changes and establishing treatment plan for wounds     Problem: Fall Risk  Goal: Patient will remain free from falls  Outcome: Progressing
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
## 2022-01-24 NOTE — PROGRESS NOTES
Bedside report received.  Assessment complete.  A&O x 4. Patient calls appropriately.  Patient turned q2. Bed alarm on.   Patient has 0/10 pain. Pain managed with prescribed medications.  Denies N&V. Tolerating reg diet.  Colostomy draining well.  Patient denies SOB.  Review plan with of care with patient. Call light and personal belongings within reach. Hourly rounding in place. All needs met at this time.

## 2022-01-25 LAB
ALBUMIN SERPL BCP-MCNC: 3.4 G/DL (ref 3.2–4.9)
ALBUMIN/GLOB SERPL: 1.2 G/DL
ALP SERPL-CCNC: 61 U/L (ref 30–99)
ALT SERPL-CCNC: 6 U/L (ref 2–50)
ANION GAP SERPL CALC-SCNC: 8 MMOL/L (ref 7–16)
AST SERPL-CCNC: 8 U/L (ref 12–45)
BASOPHILS # BLD AUTO: 0.3 % (ref 0–1.8)
BASOPHILS # BLD: 0.02 K/UL (ref 0–0.12)
BILIRUB SERPL-MCNC: 0.3 MG/DL (ref 0.1–1.5)
BUN SERPL-MCNC: 7 MG/DL (ref 8–22)
CALCIUM SERPL-MCNC: 8.8 MG/DL (ref 8.5–10.5)
CHLORIDE SERPL-SCNC: 112 MMOL/L (ref 96–112)
CO2 SERPL-SCNC: 21 MMOL/L (ref 20–33)
CREAT SERPL-MCNC: 0.53 MG/DL (ref 0.5–1.4)
EOSINOPHIL # BLD AUTO: 0.18 K/UL (ref 0–0.51)
EOSINOPHIL NFR BLD: 3 % (ref 0–6.9)
ERYTHROCYTE [DISTWIDTH] IN BLOOD BY AUTOMATED COUNT: 53.1 FL (ref 35.9–50)
GLOBULIN SER CALC-MCNC: 2.8 G/DL (ref 1.9–3.5)
GLUCOSE SERPL-MCNC: 102 MG/DL (ref 65–99)
HCT VFR BLD AUTO: 34.5 % (ref 42–52)
HGB BLD-MCNC: 11.6 G/DL (ref 14–18)
IMM GRANULOCYTES # BLD AUTO: 0.03 K/UL (ref 0–0.11)
IMM GRANULOCYTES NFR BLD AUTO: 0.5 % (ref 0–0.9)
LYMPHOCYTES # BLD AUTO: 1.36 K/UL (ref 1–4.8)
LYMPHOCYTES NFR BLD: 22.3 % (ref 22–41)
MCH RBC QN AUTO: 34.7 PG (ref 27–33)
MCHC RBC AUTO-ENTMCNC: 33.6 G/DL (ref 33.7–35.3)
MCV RBC AUTO: 103.3 FL (ref 81.4–97.8)
MONOCYTES # BLD AUTO: 0.63 K/UL (ref 0–0.85)
MONOCYTES NFR BLD AUTO: 10.3 % (ref 0–13.4)
NEUTROPHILS # BLD AUTO: 3.87 K/UL (ref 1.82–7.42)
NEUTROPHILS NFR BLD: 63.6 % (ref 44–72)
NRBC # BLD AUTO: 0 K/UL
NRBC BLD-RTO: 0 /100 WBC
PLATELET # BLD AUTO: 151 K/UL (ref 164–446)
PMV BLD AUTO: 9.2 FL (ref 9–12.9)
POTASSIUM SERPL-SCNC: 3.8 MMOL/L (ref 3.6–5.5)
PROT SERPL-MCNC: 6.2 G/DL (ref 6–8.2)
RBC # BLD AUTO: 3.34 M/UL (ref 4.7–6.1)
SODIUM SERPL-SCNC: 141 MMOL/L (ref 135–145)
VANCOMYCIN PEAK SERPL-MCNC: 33.5 UG/ML (ref 20–40)
VANCOMYCIN TROUGH SERPL-MCNC: 17.7 UG/ML (ref 10–20)
WBC # BLD AUTO: 6.1 K/UL (ref 4.8–10.8)

## 2022-01-25 PROCEDURE — 85025 COMPLETE CBC W/AUTO DIFF WBC: CPT

## 2022-01-25 PROCEDURE — 80053 COMPREHEN METABOLIC PANEL: CPT

## 2022-01-25 PROCEDURE — 700111 HCHG RX REV CODE 636 W/ 250 OVERRIDE (IP): Performed by: STUDENT IN AN ORGANIZED HEALTH CARE EDUCATION/TRAINING PROGRAM

## 2022-01-25 PROCEDURE — 80202 ASSAY OF VANCOMYCIN: CPT

## 2022-01-25 PROCEDURE — 99232 SBSQ HOSP IP/OBS MODERATE 35: CPT | Performed by: HOSPITALIST

## 2022-01-25 PROCEDURE — A9270 NON-COVERED ITEM OR SERVICE: HCPCS | Performed by: STUDENT IN AN ORGANIZED HEALTH CARE EDUCATION/TRAINING PROGRAM

## 2022-01-25 PROCEDURE — 36415 COLL VENOUS BLD VENIPUNCTURE: CPT

## 2022-01-25 PROCEDURE — 770001 HCHG ROOM/CARE - MED/SURG/GYN PRIV*

## 2022-01-25 PROCEDURE — 99232 SBSQ HOSP IP/OBS MODERATE 35: CPT | Performed by: INTERNAL MEDICINE

## 2022-01-25 PROCEDURE — 700102 HCHG RX REV CODE 250 W/ 637 OVERRIDE(OP): Performed by: STUDENT IN AN ORGANIZED HEALTH CARE EDUCATION/TRAINING PROGRAM

## 2022-01-25 PROCEDURE — 0JB70ZZ EXCISION OF BACK SUBCUTANEOUS TISSUE AND FASCIA, OPEN APPROACH: ICD-10-PCS | Performed by: PLASTIC SURGERY

## 2022-01-25 PROCEDURE — 700105 HCHG RX REV CODE 258: Performed by: STUDENT IN AN ORGANIZED HEALTH CARE EDUCATION/TRAINING PROGRAM

## 2022-01-25 RX ORDER — AMLODIPINE BESYLATE 2.5 MG/1
2.5 TABLET ORAL DAILY
Status: DISCONTINUED | OUTPATIENT
Start: 2022-01-26 | End: 2022-01-26

## 2022-01-25 RX ADMIN — SENNOSIDES 17.2 MG: 8.6 TABLET, FILM COATED ORAL at 22:47

## 2022-01-25 RX ADMIN — VANCOMYCIN HYDROCHLORIDE 1250 MG: 5 INJECTION, POWDER, LYOPHILIZED, FOR SOLUTION INTRAVENOUS at 04:51

## 2022-01-25 RX ADMIN — BACLOFEN 20 MG: 10 TABLET ORAL at 12:54

## 2022-01-25 RX ADMIN — VANCOMYCIN HYDROCHLORIDE 1250 MG: 5 INJECTION, POWDER, LYOPHILIZED, FOR SOLUTION INTRAVENOUS at 17:47

## 2022-01-25 RX ADMIN — Medication 2000 UNITS: at 04:56

## 2022-01-25 RX ADMIN — DOCUSATE SODIUM 200 MG: 100 CAPSULE ORAL at 22:47

## 2022-01-25 RX ADMIN — Medication 10 MG: at 22:48

## 2022-01-25 RX ADMIN — PIPERACILLIN AND TAZOBACTAM 4.5 G: 4; .5 INJECTION, POWDER, LYOPHILIZED, FOR SOLUTION INTRAVENOUS; PARENTERAL at 22:48

## 2022-01-25 RX ADMIN — SENNOSIDES 17.2 MG: 8.6 TABLET, FILM COATED ORAL at 10:06

## 2022-01-25 RX ADMIN — DOCUSATE SODIUM 200 MG: 100 CAPSULE ORAL at 10:06

## 2022-01-25 RX ADMIN — BACLOFEN 20 MG: 10 TABLET ORAL at 22:47

## 2022-01-25 RX ADMIN — FERROUS SULFATE TAB 325 MG (65 MG ELEMENTAL FE) 325 MG: 325 (65 FE) TAB at 17:55

## 2022-01-25 RX ADMIN — POLYETHYLENE GLYCOL 3350 1 PACKET: 17 POWDER, FOR SOLUTION ORAL at 04:57

## 2022-01-25 RX ADMIN — Medication 400 MG: at 04:56

## 2022-01-25 RX ADMIN — LOSARTAN POTASSIUM 50 MG: 50 TABLET, FILM COATED ORAL at 22:47

## 2022-01-25 RX ADMIN — FERROUS SULFATE TAB 325 MG (65 MG ELEMENTAL FE) 325 MG: 325 (65 FE) TAB at 04:57

## 2022-01-25 RX ADMIN — PIPERACILLIN AND TAZOBACTAM 4.5 G: 4; .5 INJECTION, POWDER, LYOPHILIZED, FOR SOLUTION INTRAVENOUS; PARENTERAL at 04:58

## 2022-01-25 RX ADMIN — BACLOFEN 20 MG: 10 TABLET ORAL at 08:54

## 2022-01-25 RX ADMIN — OXYCODONE HYDROCHLORIDE AND ACETAMINOPHEN 1000 MG: 500 TABLET ORAL at 04:57

## 2022-01-25 RX ADMIN — BACLOFEN 20 MG: 10 TABLET ORAL at 17:55

## 2022-01-25 RX ADMIN — ENOXAPARIN SODIUM 40 MG: 40 INJECTION SUBCUTANEOUS at 04:57

## 2022-01-25 RX ADMIN — PIPERACILLIN AND TAZOBACTAM 4.5 G: 4; .5 INJECTION, POWDER, LYOPHILIZED, FOR SOLUTION INTRAVENOUS; PARENTERAL at 12:54

## 2022-01-25 ASSESSMENT — ENCOUNTER SYMPTOMS
WEAKNESS: 1
ABDOMINAL PAIN: 0
FOCAL WEAKNESS: 1
NERVOUS/ANXIOUS: 0
VOMITING: 0
BLURRED VISION: 0
CHILLS: 0
PALPITATIONS: 0
FOCAL WEAKNESS: 0
DIZZINESS: 0
FEVER: 0
HEMOPTYSIS: 0
NAUSEA: 0
MYALGIAS: 0
DOUBLE VISION: 0
COUGH: 0
SENSORY CHANGE: 1
SHORTNESS OF BREATH: 0

## 2022-01-25 ASSESSMENT — PAIN DESCRIPTION - PAIN TYPE: TYPE: ACUTE PAIN
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
## 2022-01-25 NOTE — PROGRESS NOTES
Bedside report received and patient care assumed. Pt is resting in bed, A&Ox4, with no complaints of pain, and is on RA. Wound care tomorrow. All fall precautions are in place, belongings at bedside table.  Pt was updated on POC, no questions or concerns. Pt educated on use of call light for assistance. Will continue to monitor.

## 2022-01-25 NOTE — PROGRESS NOTES
Infectious Disease Progress Note    Author: Rosmery Crabtree M.D. Date & Time of service: 2022  11:21 AM    Chief Complaint:  Clinical OM    Interval History:  71 y.o. male admitted 2022.  Patient with known C5-7 paraplegia, neurogenic bladder with suprapubic catheter, history of stage IV sacral decubitus ulcer complicated by sacral osteomyelitis with abscess, completed therapy in , decubitus ulcers, recent admission in 2021 with ureteral stone and hydronephrosis, left ureteral stent placed, with plan for lithotripsy later.  Patient presented to the ER on  with nonhealing wound over the left ankle with heterotopic bone   AF tolerating abx well-he states waiting to hear if plan for VAC or skin graft-also wants to know if there is a plan for bone biopsy   AF WBC 6.1  no acute events overnight    Labs Reviewed, Medications Reviewed and Wound Reviewed.    Review of Systems:  Review of Systems   Constitutional: Negative for fever.   Respiratory: Negative for cough and shortness of breath.    Gastrointestinal: Negative for nausea and vomiting.   Neurological: Positive for sensory change and focal weakness.   All other systems reviewed and are negative.      Hemodynamics:  Temp (24hrs), Av.3 °C (97.4 °F), Min:36.2 °C (97.1 °F), Max:36.4 °C (97.6 °F)  Temperature: 36.2 °C (97.1 °F)  Pulse  Av.3  Min: 41  Max: 69   Blood Pressure : (!) 87/45       Physical Exam:  Physical Exam  Vitals and nursing note reviewed.   Constitutional:       General: He is not in acute distress.     Appearance: He is not ill-appearing, toxic-appearing or diaphoretic.   Eyes:      Extraocular Movements: Extraocular movements intact.      Pupils: Pupils are equal, round, and reactive to light.   Cardiovascular:      Rate and Rhythm: Normal rate.   Pulmonary:      Effort: Pulmonary effort is normal. No respiratory distress.   Abdominal:      General: There is no distension.   Musculoskeletal:       Comments: Foot dressed   Skin:     Coloration: Skin is not jaundiced.   Neurological:      Mental Status: He is alert.      Motor: Weakness present.      Comments: quadriplegic         Meds:    Current Facility-Administered Medications:   •  ferrous sulfate  •  ascorbic acid  •  vitamin D3  •  enoxaparin (LOVENOX) injection  •  amLODIPine  •  docusate sodium  •  polyethylene glycol/lytes  •  sennosides  •  melatonin  •  oxyCODONE immediate-release  •  magnesium oxide  •  baclofen  •  losartan  •  MD Alert...Vancomycin per Pharmacy  •  piperacillin-tazobactam  •  vancomycin  •  acetaminophen  •  LR  •  ondansetron  •  ondansetron  •  labetalol  •  Notify provider if pain remains uncontrolled **AND** Use the Numeric Rating Scale (NRS), Garcia-Baker Faces (WBF), or FLACC on regular floors and Critical-Care Pain Observation Tool (CPOT) on ICUs/Trauma to assess pain **AND** Pulse Ox **AND** Pharmacy Consult Request **AND** If patient difficult to arouse and/or has respiratory depression (respiratory rate of 10 or less), stop any opiates that are currently infusing and call a Rapid Response.  •  [DISCONTINUED] oxyCODONE immediate-release **OR** oxyCODONE immediate-release **OR** HYDROmorphone    Labs:  Recent Labs     01/23/22 0316 01/25/22 0459   WBC 6.7 6.1   RBC 3.60* 3.34*   HEMOGLOBIN 12.2* 11.6*   HEMATOCRIT 37.3* 34.5*   .6* 103.3*   MCH 33.9* 34.7*   RDW 52.9* 53.1*   PLATELETCT 152* 151*   MPV 8.8* 9.2   NEUTSPOLYS 67.90 63.60   LYMPHOCYTES 18.30* 22.30   MONOCYTES 9.80 10.30   EOSINOPHILS 3.10 3.00   BASOPHILS 0.30 0.30     Recent Labs     01/23/22 0316 01/24/22 1114 01/25/22 0459   SODIUM 141 145 141   POTASSIUM 3.8 4.0 3.8   CHLORIDE 109 112 112   CO2 22 23 21   GLUCOSE 99 119* 102*   BUN 11 7* 7*     Recent Labs     01/23/22 0316 01/24/22 1114 01/25/22 0459   ALBUMIN 3.6 3.6 3.4   TBILIRUBIN 0.5 0.3 0.3   ALKPHOSPHAT 64 62 61   TOTPROTEIN 6.2 6.2 6.2   ALTSGPT 8 6 6   ASTSGOT 10* 10* 8*    CREATININE 0.53 0.60 0.53       Imaging:  DX-ANKLE 2- VIEWS LEFT    Result Date: 1/21/2022 1/21/2022 2:58 PM HISTORY/REASON FOR EXAM:  soft tissue wound ? Exposed bone osteo etc. Left ankle wound bone exposed TECHNIQUE/EXAM DESCRIPTION AND NUMBER OF VIEWS:  2 views of the LEFT ankle. COMPARISON: None. FINDINGS: Healed fracture deformity of the distal tibia with extensive surrounding heterotopic ossification. There is heterogeneous appearance of the cortex along the anterior aspect of the tibia Healed fracture deformity of the distal fibula. No joint arthropathy.     Healed fracture deformity of the distal tibia with extensive surrounding heterotopic ossification. Healed fracture deformity of the distal fibula. There is heterogeneous appearance of the cortex along the anterior aspect of the tibia. Osteomyelitis is possible. Further evaluation with MRI can be helpful    DX-CYSTO FLUORO > 1 HOUR    Result Date: 1/4/2022 1/4/2022 6:02 PM HISTORY/REASON FOR EXAM:  Left ureteral stent placement. TECHNIQUE/EXAM DESCRIPTION AND NUMBER OF VIEWS: Cysto fluoroscopy for greater than one hour. FINDINGS: Cysto fluoroscopy was utilized for greater than one hour. Actual fluoro time was 19 seconds. Single frontal view was obtained by the urologist which demonstrates a left ureteral stent.     Cysto fluoroscopy utilized for 19 seconds by the urologist. INTERPRETING LOCATION: [address] [place] [address], [facility]      Micro:  Results     Procedure Component Value Units Date/Time    CULTURE WOUND W/ GRAM STAIN [[id number]]     Order Status: No result Specimen: Wound from Left Leg           Assessment:  Active Hospital Problems    Diagnosis    • Osteomyelitis of left ankle ([facility]) [M86.9]    • Quadriplegia, C5-C7 complete ([facility]) [G82.53]    • Pressure injury of sacral region, stage 4 ([facility]) [L89.154]    • Essential hypertension [I10]    Left leg osteomyelitis- given chronic wound with exposed bone and positive wound swab, at risk for  osteomyelitis   Polymicrobial infection-MRSA ESBL Proteus  C5-C7 paraplegia  Chronic decubitus ulcers incl sacaral  History of sacral osteomyelitis with abscess  S/p OR 2x3 cm wound was debrided of a large amount of   heterotopic bone and all edges were debrided back to healthy tissue  Allergic to sulfa    Plan:  Wound swab (unreliable determinant of OM. Also poor indicator of pathogen if OM present except for MRSA)  Continue vancomycin for now  Monitor vancomycin trough and renal function closely  Continue Zosyn for now  Obtain bone biopsy if feasible  Wound care  Final antibiotic recommendations per culture results and clinical course    DW Dr Ragsdale

## 2022-01-25 NOTE — PROGRESS NOTES
"   Orthopaedic PA Progress Note    Interval changes:did well overnight. Follow-Up: needs appointment with Dr. Stewart at [facility] at 10-14 days post-op for re-evaluation, staple removal and radiographs.      ROS - Patient denies any new issues. No chest pain, dyspnea, or fever.  Pain well controlled.    /71   Pulse (!) 48   Temp 36.3 °C (97.4 °F) (Temporal)   Resp 18   Ht 1.778 m (5' 10\")   Wt 105 kg (231 lb 11.3 oz)   SpO2 97%     Patient seen and examined  No acute distress  Breathing non labored  RRR  Dressing intact  Foot warm distally  Compartments soft proximally    Recent Labs     01/22/22  0210 01/23/22  0316   WBC 6.8 6.7   RBC 3.71* 3.60*   HEMOGLOBIN 12.6* 12.2*   HEMATOCRIT 38.8* 37.3*   .6* 103.6*   MCH 34.0* 33.9*   MCHC 32.5* 32.7*   RDW 53.6* 52.9*   PLATELETCT 172 152*   MPV 9.0 8.8*     Active Hospital Problems    Diagnosis    • Essential hypertension [I10]      Priority: Medium   • Osteomyelitis of left ankle ([facility]) [M86.9]    • Quadriplegia, C5-C7 complete ([facility]) [G82.53]    • Pressure injury of sacral region, stage 4 ([facility]) [L89.154]      Assessment/Plan:  POD#2 S/P I+D LLE  Wt bearing status - quadriplegic, as tolerated, PT/OT for mobility  PT/OT-initiated  Wound care:Wound care team to see if any further wound complications  Drains - none  Cazares-per Med  Sutures/Staples out- 10-14 days post operatively  Antibiotics: per ID  DVT Prophylaxis- TEDS/SCDs/Foot pumps.   Lovenox: OK from Ortho standpoint, no orthopedic indications  Future Procedures - none planned this admission   Case Coordination for Discharge Planning - Disposition Clear for disposition (home/SNF/IRF) from Orthopaedic team standpoint.        "

## 2022-01-25 NOTE — CARE PLAN
The patient is Stable - Low risk of patient condition declining or worsening    Shift Goals  Clinical Goals: Wound care; Q2 turns  Patient Goals: Rest; Range of motion  Family Goals: KEN    Progress made toward(s) clinical / shift goals:    Problem: Knowledge Deficit - Standard  Goal: Patient and family/care givers will demonstrate understanding of plan of care, disease process/condition, diagnostic tests and medications  1/25/2022 1529 by Lorena Boyd R.N.  Outcome: Progressing  1/25/2022 1314 by Lorena Boyd R.N.  Outcome: Progressing     Problem: Skin Integrity  Goal: Skin integrity is maintained or improved  1/25/2022 1529 by Lorena Boyd R.N.  Outcome: Progressing  1/25/2022 1314 by Lorena Boyd R.N.  Outcome: Progressing     Problem: Fall Risk  Goal: Patient will remain free from falls  1/25/2022 1529 by Lorena Boyd R.N.  Outcome: Progressing  1/25/2022 1314 by Lorena Boyd R.N.  Outcome: Progressing       Patient is not progressing towards the following goals:

## 2022-01-25 NOTE — DIETARY
"Nutrition services: Day 4 of admit.  Osvaldo Pate is a 71 y.o. male with admitting DX of osteomyelitis, wound infection.     Consult received for stage 4 pressure injury noted to sacrum per hospital problems list. Per wound team note 1/22, pt with unstageable wound to sacrum. Pt is currently receiving 1000 mg of Vit C daily. Voalte message sent to MD yesterday requesting a multi vitamin with minerals daily. MD stated she would add. No other nutrition recommendations at this time.    Assessment:  Height: 177.8 cm (5' 10\")  Weight: 105 kg (231 lb 11.3 oz)  Body mass index is 33.25 kg/m²., BMI classification: obese class I  Diet/Intake: Regular; PO >75% of all meals thus far    Malnutrition Risk: Does not meet criteria per ASPEN guidelines at this time.    Recommendations/Plan:  1. Encourage intake of meals.  2. Document intake of all meals as % taken in ADLs to provide interdisciplinary communication across all shifts.   3. Monitor weight.  4. Nutrition rep will continue to see patient for ongoing meal and snack preferences.     RD will monitor per department guidelines.      "

## 2022-01-25 NOTE — DISCHARGE PLANNING
Anticipated Discharge Disposition: TBD    Action: Pt is on service with [facility]. Will need resume HH orders if appropriate for d/c home @ d/c.     Barriers to Discharge: None    Plan: Care coordination will continue to follow and assist with discharge planning

## 2022-01-25 NOTE — CARE PLAN
The patient is Stable - Low risk of patient condition declining or worsening    Shift Goals  Clinical Goals: Wound care; Q2 turns  Patient Goals: Rest; Range of motion  Family Goals: KEN    Progress made toward(s) clinical / shift goals:    Problem: Knowledge Deficit - Standard  Goal: Patient and family/care givers will demonstrate understanding of plan of care, disease process/condition, diagnostic tests and medications  Outcome: Progressing     Problem: Skin Integrity  Goal: Skin integrity is maintained or improved  Outcome: Progressing     Problem: Fall Risk  Goal: Patient will remain free from falls  Outcome: Progressing       Patient is not progressing towards the following goals:

## 2022-01-25 NOTE — CARE PLAN
The patient is Stable - Low risk of patient condition declining or worsening    Shift Goals  Clinical Goals: q2 turns, wound care tomorrow  Patient Goals: rest  Family Goals: KEN    Progress made toward(s) clinical / shift goals:    Problem: Skin Integrity  Goal: Skin integrity is maintained or improved  Outcome: Progressing  Note: Appropriate interventions in place to protect skin. Pt on q2 turns, with suprapubic catheter for urinary elimination. Bed changes PRN. Heels floated, extra pillows for positioning. Wound on board.       Problem: Fall Risk  Goal: Patient will remain free from falls  Outcome: Progressing  Note: Hand hygiene is completed when entering and exiting room.         Patient is not progressing towards the following goals:

## 2022-01-26 LAB
BACTERIA BLD CULT: NORMAL
BACTERIA BLD CULT: NORMAL
SIGNIFICANT IND 70042: NORMAL
SIGNIFICANT IND 70042: NORMAL
SITE SITE: NORMAL
SITE SITE: NORMAL
SOURCE SOURCE: NORMAL
SOURCE SOURCE: NORMAL
VANCOMYCIN TROUGH SERPL-MCNC: 19.4 UG/ML (ref 10–20)

## 2022-01-26 PROCEDURE — 99024 POSTOP FOLLOW-UP VISIT: CPT | Performed by: ORTHOPAEDIC SURGERY

## 2022-01-26 PROCEDURE — A9270 NON-COVERED ITEM OR SERVICE: HCPCS | Performed by: STUDENT IN AN ORGANIZED HEALTH CARE EDUCATION/TRAINING PROGRAM

## 2022-01-26 PROCEDURE — 700102 HCHG RX REV CODE 250 W/ 637 OVERRIDE(OP): Performed by: STUDENT IN AN ORGANIZED HEALTH CARE EDUCATION/TRAINING PROGRAM

## 2022-01-26 PROCEDURE — 700111 HCHG RX REV CODE 636 W/ 250 OVERRIDE (IP): Performed by: STUDENT IN AN ORGANIZED HEALTH CARE EDUCATION/TRAINING PROGRAM

## 2022-01-26 PROCEDURE — 97602 WOUND(S) CARE NON-SELECTIVE: CPT

## 2022-01-26 PROCEDURE — 99232 SBSQ HOSP IP/OBS MODERATE 35: CPT | Performed by: HOSPITALIST

## 2022-01-26 PROCEDURE — 770001 HCHG ROOM/CARE - MED/SURG/GYN PRIV*

## 2022-01-26 PROCEDURE — 700102 HCHG RX REV CODE 250 W/ 637 OVERRIDE(OP): Performed by: INTERNAL MEDICINE

## 2022-01-26 PROCEDURE — 36415 COLL VENOUS BLD VENIPUNCTURE: CPT

## 2022-01-26 PROCEDURE — 700102 HCHG RX REV CODE 250 W/ 637 OVERRIDE(OP): Performed by: HOSPITALIST

## 2022-01-26 PROCEDURE — 700105 HCHG RX REV CODE 258: Performed by: STUDENT IN AN ORGANIZED HEALTH CARE EDUCATION/TRAINING PROGRAM

## 2022-01-26 PROCEDURE — 8968 PR NO CHARGE - PROCEDURE: Performed by: PHYSICIAN ASSISTANT

## 2022-01-26 PROCEDURE — 80202 ASSAY OF VANCOMYCIN: CPT

## 2022-01-26 PROCEDURE — A9270 NON-COVERED ITEM OR SERVICE: HCPCS | Performed by: INTERNAL MEDICINE

## 2022-01-26 PROCEDURE — 99232 SBSQ HOSP IP/OBS MODERATE 35: CPT | Performed by: INTERNAL MEDICINE

## 2022-01-26 PROCEDURE — 700105 HCHG RX REV CODE 258: Performed by: HOSPITALIST

## 2022-01-26 PROCEDURE — A9270 NON-COVERED ITEM OR SERVICE: HCPCS | Performed by: HOSPITALIST

## 2022-01-26 RX ORDER — DOXYCYCLINE 100 MG/1
100 TABLET ORAL EVERY 12 HOURS
Status: DISCONTINUED | OUTPATIENT
Start: 2022-01-26 | End: 2022-01-31 | Stop reason: HOSPADM

## 2022-01-26 RX ORDER — AMLODIPINE BESYLATE 5 MG/1
5 TABLET ORAL DAILY
Status: DISCONTINUED | OUTPATIENT
Start: 2022-01-26 | End: 2022-01-30

## 2022-01-26 RX ADMIN — Medication 400 MG: at 06:14

## 2022-01-26 RX ADMIN — Medication 2000 UNITS: at 06:14

## 2022-01-26 RX ADMIN — FERROUS SULFATE TAB 325 MG (65 MG ELEMENTAL FE) 325 MG: 325 (65 FE) TAB at 06:14

## 2022-01-26 RX ADMIN — BACLOFEN 20 MG: 10 TABLET ORAL at 17:53

## 2022-01-26 RX ADMIN — FERROUS SULFATE TAB 325 MG (65 MG ELEMENTAL FE) 325 MG: 325 (65 FE) TAB at 17:53

## 2022-01-26 RX ADMIN — Medication 10 MG: at 22:02

## 2022-01-26 RX ADMIN — SODIUM CHLORIDE, POTASSIUM CHLORIDE, SODIUM LACTATE AND CALCIUM CHLORIDE: 600; 310; 30; 20 INJECTION, SOLUTION INTRAVENOUS at 05:20

## 2022-01-26 RX ADMIN — AMLODIPINE BESYLATE 5 MG: 5 TABLET ORAL at 10:34

## 2022-01-26 RX ADMIN — LOSARTAN POTASSIUM 50 MG: 50 TABLET, FILM COATED ORAL at 22:07

## 2022-01-26 RX ADMIN — PIPERACILLIN AND TAZOBACTAM 4.5 G: 4; .5 INJECTION, POWDER, LYOPHILIZED, FOR SOLUTION INTRAVENOUS; PARENTERAL at 13:23

## 2022-01-26 RX ADMIN — VANCOMYCIN HYDROCHLORIDE 1250 MG: 5 INJECTION, POWDER, LYOPHILIZED, FOR SOLUTION INTRAVENOUS at 05:16

## 2022-01-26 RX ADMIN — DOXYCYCLINE 100 MG: 100 TABLET, FILM COATED ORAL at 17:53

## 2022-01-26 RX ADMIN — ENOXAPARIN SODIUM 40 MG: 40 INJECTION SUBCUTANEOUS at 06:14

## 2022-01-26 RX ADMIN — BACLOFEN 20 MG: 10 TABLET ORAL at 10:34

## 2022-01-26 RX ADMIN — PIPERACILLIN AND TAZOBACTAM 4.5 G: 4; .5 INJECTION, POWDER, LYOPHILIZED, FOR SOLUTION INTRAVENOUS; PARENTERAL at 22:02

## 2022-01-26 RX ADMIN — BACLOFEN 20 MG: 10 TABLET ORAL at 13:23

## 2022-01-26 RX ADMIN — POLYETHYLENE GLYCOL 3350 1 PACKET: 17 POWDER, FOR SOLUTION ORAL at 06:00

## 2022-01-26 RX ADMIN — PIPERACILLIN AND TAZOBACTAM 4.5 G: 4; .5 INJECTION, POWDER, LYOPHILIZED, FOR SOLUTION INTRAVENOUS; PARENTERAL at 06:14

## 2022-01-26 RX ADMIN — DOXYCYCLINE 100 MG: 100 TABLET, FILM COATED ORAL at 10:34

## 2022-01-26 RX ADMIN — OXYCODONE HYDROCHLORIDE AND ACETAMINOPHEN 1000 MG: 500 TABLET ORAL at 06:14

## 2022-01-26 RX ADMIN — BACLOFEN 20 MG: 10 TABLET ORAL at 22:02

## 2022-01-26 ASSESSMENT — ENCOUNTER SYMPTOMS
VOMITING: 0
FOCAL WEAKNESS: 1
FEVER: 0
PALPITATIONS: 0
NAUSEA: 0
DIZZINESS: 0
WEAKNESS: 1
HEMOPTYSIS: 0
COUGH: 0
SHORTNESS OF BREATH: 0
NERVOUS/ANXIOUS: 0
BLURRED VISION: 0
MYALGIAS: 0
DOUBLE VISION: 0
SENSORY CHANGE: 1
ABDOMINAL PAIN: 0
FOCAL WEAKNESS: 0

## 2022-01-26 ASSESSMENT — PAIN DESCRIPTION - PAIN TYPE: TYPE: ACUTE PAIN

## 2022-01-26 NOTE — PROGRESS NOTES
Infectious Disease Progress Note    Author: Rosmery Crabtree M.D. Date & Time of service: 2022  12:52 PM    Chief Complaint:  Clinical OM    Interval History:  71 y.o. male admitted 2022.  Patient with known C5-7 paraplegia, neurogenic bladder with suprapubic catheter, history of stage IV sacral decubitus ulcer complicated by sacral osteomyelitis with abscess, completed therapy in , decubitus ulcers, recent admission in 2021 with ureteral stone and hydronephrosis, left ureteral stent placed, with plan for lithotripsy later.  Patient presented to the ER on  with nonhealing wound over the left ankle with heterotopic bone   AF tolerating abx well-he states waiting to hear if plan for VAC or skin graft-also wants to know if there is a plan for bone biopsy   AF WBC 6.1  no acute events overnight   AF Plastics eval appreciated-no need for additional surgery No new complaints    Labs Reviewed, Medications Reviewed and Wound Reviewed.    Review of Systems:  Review of Systems   Constitutional: Negative for fever.   Respiratory: Negative for cough and shortness of breath.    Cardiovascular: Negative for chest pain.   Gastrointestinal: Negative for nausea and vomiting.   Neurological: Positive for sensory change and focal weakness.   All other systems reviewed and are negative.      Hemodynamics:  Temp (24hrs), Av.8 °C (98.2 °F), Min:36.3 °C (97.4 °F), Max:37.1 °C (98.7 °F)  Temperature: 36.3 °C (97.4 °F)  Pulse  Av.6  Min: 41  Max: 71   Blood Pressure : 150/74       Physical Exam:  Physical Exam  Vitals and nursing note reviewed.   Constitutional:       General: He is not in acute distress.     Appearance: He is not ill-appearing, toxic-appearing or diaphoretic.   HENT:      Nose: No congestion or rhinorrhea.   Eyes:      General: No scleral icterus.     Extraocular Movements: Extraocular movements intact.      Pupils: Pupils are equal, round, and reactive to light.    Cardiovascular:      Rate and Rhythm: Normal rate.   Pulmonary:      Effort: Pulmonary effort is normal. No respiratory distress.      Breath sounds: No stridor.   Abdominal:      General: There is no distension.   Musculoskeletal:      Comments: Foot dressed   Skin:     Coloration: Skin is not jaundiced.   Neurological:      Mental Status: He is alert.      Motor: Weakness present.      Comments: quadriplegic         Meds:    Current Facility-Administered Medications:   •  amLODIPine  •  doxycycline monohydrate  •  ferrous sulfate  •  ascorbic acid  •  vitamin D3  •  enoxaparin (LOVENOX) injection  •  polyethylene glycol/lytes  •  melatonin  •  oxyCODONE immediate-release  •  magnesium oxide  •  baclofen  •  losartan  •  piperacillin-tazobactam  •  acetaminophen  •  LR  •  ondansetron  •  ondansetron  •  labetalol  •  Notify provider if pain remains uncontrolled **AND** Use the Numeric Rating Scale (NRS), Garcia-Baker Faces (WBF), or FLACC on regular floors and Critical-Care Pain Observation Tool (CPOT) on ICUs/Trauma to assess pain **AND** Pulse Ox **AND** Pharmacy Consult Request **AND** If patient difficult to arouse and/or has respiratory depression (respiratory rate of 10 or less), stop any opiates that are currently infusing and call a Rapid Response.  •  [DISCONTINUED] oxyCODONE immediate-release **OR** oxyCODONE immediate-release **OR** HYDROmorphone    Labs:  Recent Labs     01/25/22  0459   WBC 6.1   RBC 3.34*   HEMOGLOBIN 11.6*   HEMATOCRIT 34.5*   .3*   MCH 34.7*   RDW 53.1*   PLATELETCT 151*   MPV 9.2   NEUTSPOLYS 63.60   LYMPHOCYTES 22.30   MONOCYTES 10.30   EOSINOPHILS 3.00   BASOPHILS 0.30     Recent Labs     01/24/22  1114 01/25/22  0459   SODIUM 145 141   POTASSIUM 4.0 3.8   CHLORIDE 112 112   CO2 23 21   GLUCOSE 119* 102*   BUN 7* 7*     Recent Labs     01/24/22  1114 01/25/22  0459   ALBUMIN 3.6 3.4   TBILIRUBIN 0.3 0.3   ALKPHOSPHAT 62 61   TOTPROTEIN 6.2 6.2   ALTSGPT 6 6   ASTSGOT 10*  8*   CREATININE 0.60 0.53       Imaging:  DX-ANKLE 2- VIEWS LEFT    Result Date: 1/21/2022 1/21/2022 2:58 PM HISTORY/REASON FOR EXAM:  soft tissue wound ? Exposed bone osteo etc. Left ankle wound bone exposed TECHNIQUE/EXAM DESCRIPTION AND NUMBER OF VIEWS:  2 views of the LEFT ankle. COMPARISON: None. FINDINGS: Healed fracture deformity of the distal tibia with extensive surrounding heterotopic ossification. There is heterogeneous appearance of the cortex along the anterior aspect of the tibia Healed fracture deformity of the distal fibula. No joint arthropathy.     Healed fracture deformity of the distal tibia with extensive surrounding heterotopic ossification. Healed fracture deformity of the distal fibula. There is heterogeneous appearance of the cortex along the anterior aspect of the tibia. Osteomyelitis is possible. Further evaluation with MRI can be helpful    DX-CYSTO FLUORO > 1 HOUR    Result Date: 1/4/2022 1/4/2022 6:02 PM HISTORY/REASON FOR EXAM:  Left ureteral stent placement. TECHNIQUE/EXAM DESCRIPTION AND NUMBER OF VIEWS: Cysto fluoroscopy for greater than one hour. FINDINGS: Cysto fluoroscopy was utilized for greater than one hour. Actual fluoro time was 19 seconds. Single frontal view was obtained by the urologist which demonstrates a left ureteral stent.     Cysto fluoroscopy utilized for 19 seconds by the urologist. INTERPRETING LOCATION: [address] [place] [address], [facility]      Micro:  Results     Procedure Component Value Units Date/Time    CULTURE WOUND W/ GRAM STAIN [[id number]]     Order Status: No result Specimen: Wound from Left Leg           Assessment:  Active Hospital Problems    Diagnosis    • Osteomyelitis of left ankle ([facility]) [M86.9]    • Quadriplegia, C5-C7 complete ([facility]) [G82.53]    • Pressure injury of sacral region, stage 4 ([facility]) [L89.154]    • Essential hypertension [I10]    Left leg osteomyelitis- given chronic wound with exposed bone and positive wound swab, at risk for osteomyelitis    Polymicrobial infection-MRSA ESBL Proteus  C5-C7 paraplegia  Chronic decubitus ulcers incl sacaral  History of sacral osteomyelitis with abscess  S/p OR 2x3 cm wound was debrided of a large amount of   heterotopic bone and all edges were debrided back to healthy tissue  Allergic to sulfa    Plan:  Wound swab (unreliable determinant of OM. Also poor indicator of pathogen if OM present except for MRSA)  DC vanco and start doxy. Anticipate 4-6 week course  Continue Zosyn for now-anticipate 2 week course unless GNR found on bone biopsy  Obtain bone biopsy if feasible  Wound care  Final antibiotic recommendations per culture results and clinical course    BETTY RN

## 2022-01-26 NOTE — WOUND TEAM
"  [facility]     Inpatient Services     Established Ostomy Management/ troubleshooting      HPI: Reviewed  PMH: Reviewed   SH: Reviewed   Reason for Ostomy nurse consult:  Established colostomy    Objective: appliance lifting    Colostomy LUQ (Active)   Stomal Appliance Assessment Intact 01/26/22 1300   Stoma Assessment Intact 01/26/22 1300   Stoma Shape Oval 01/26/22 1300   Peristomal Assessment Intact 01/26/22 1300   Mucocutaneous Junction Intact 01/26/22 1300   Treatment Appliance Changed 01/26/22 1300   Stomal Appliance Paste Ring, 2\";2 3/4\" (70mm) CTF 01/26/22 1300   Output (mL) 100 mL 01/24/22 2000   Output Color Brown 01/26/22 1300   WOUND RN ONLY - Stomal Appliance  2 Piece;Paste Ring, 2\";2 3/4\" (70mm) CTF;Transparent Pouch Lock & Roll 01/26/22 1300   Appliance (Pouch) # [id number] 01/26/22 1300   Appliance Brand [place] 01/26/22 1300   Secure Start completed No 01/26/22 1300   Ostomy Consult Consult not ordered 01/26/22 1300       Ostomy Appliance (type and size): 2.75\" 2 pc with MA     Interventions and Education: appliance removed. Stoma and skin cleansed with moist washcloth. Barrier CTF, paste ring adhered and then applied to skin. Pouch attached and end closed.     Evaluation: Stoma and peristomal skin intact. Pt states appliance had not been change for more than a week.    Plan: Bedside RNs to assist pt with appliance changes. Ostomy RN to remain available PRN for any needs.    Anticipated discharge needs: None  "

## 2022-01-26 NOTE — CARE PLAN
Problem: Knowledge Deficit - Standard  Goal: Patient and family/care givers will demonstrate understanding of plan of care, disease process/condition, diagnostic tests and medications  Note: HD T, TH, S, monitor BP     Problem: Respiratory  Goal: Patient will achieve/maintain optimum respiratory ventilation and gas exchange  Note: Encouraged use of IS   The patient is Watcher - Medium risk of patient condition declining or worsening    Shift Goals  Clinical Goals: wound care, q 2 turns, monitor BP  Patient Goals: rest  Family Goals: KEN    Progress made toward(s) clinical / shift goals:  PT/OT, monitor BP    Patient is not progressing towards the following goals:
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
## 2022-01-26 NOTE — WOUND TEAM
[facility]  Inpatient Services   Wound and Skin Care Evaluation    Admission Date: 1/21/2022     Last order of IP CONSULT TO WOUND CARE was found on 1/22/2022 from Hospital Encounter on 1/21/2022     HPI, PMH, SH: Reviewed    Past Surgical History:   Procedure Laterality Date   • INCISION AND DRAINAGE ORTHOPEDIC  1/22/2022    Procedure: INCISION AND DRAINAGE, WOUND, BY ORTHOPEDICS;  Surgeon: Erasmo Stewart M.D.;  Location: [facility];  Service: Orthopedics   • NY CYSTOSCOPY,INSERT URETERAL STENT Left 1/4/2022    Procedure: CYSTOSCOPY, WITH URETERAL STENT INSERTION;  Surgeon: Osvaldo Baltazar M.D.;  Location: [facility];  Service: Urology   • NY CYSTO/URETERO/PYELOSCOPY, DX Left 1/4/2022    Procedure: URETEROSCOPY;  Surgeon: Osvaldo Baltazar M.D.;  Location: [facility];  Service: Urology   • LASERTRIPSY N/A 1/4/2022    Procedure: LITHOTRIPSY, USING LASER;  Surgeon: Osvaldo Baltazar M.D.;  Location: [facility];  Service: Urology   • NY CYSTOSCOPY,INSERT URETERAL STENT Left 12/16/2021    Procedure: CYSTOSCOPY, WITH URETERAL STENT INSERTION;  Surgeon: Aly Bowen M.D.;  Location: [facility];  Service: Urology   • NY CYSTO/URETERO/PYELOSCOPY, DX Left 12/16/2021    Procedure: URETEROSCOPY;  Surgeon: Aly Bowen M.D.;  Location: [facility];  Service: Urology   • LASERTRIPSY Left 12/16/2021    Procedure: LITHOTRIPSY, USING LASER;  Surgeon: Aly Bowen M.D.;  Location: [facility];  Service: Urology   • IRRIGATION & DEBRIDEMENT GENERAL  12/20/2020    Procedure: IRRIGATION AND DEBRIDEMENT, WOUND SACRAL ULCER;  Surgeon: Matt Cummins M.D.;  Location: [facility];  Service: Plastics   • ULCER DEBRIDEMENT N/A 8/21/2019    Procedure: debridement of Sacral grade 4 ulcer - W/BONE BIOPSY, 3 liter wash out. bilateral sliding gluteal myocutaneous flap advancement;  Surgeon: Amadeo Moon M.D.;  Location:  [facility];  Service: Plastics   • FLAP CLOSURE  2019    Procedure: CLOSURE, FLAP - MUSCLE;  Surgeon: Amadeo Moon M.D.;  Location: SUR[facility]ERY [facility];  Service: Plastics   • COLOSTOMY N/A 2019    Procedure: CREATION, COLOSTOMY -  placement;  Surgeon: Elías Hannah M.D.;  Location: SURGERY [facility];  Service: General   • COLOSTOMY     • COLOSTOMY TAKEDOWN     • HERNIA REPAIR     • PERCUTANEOUOSPINNING LOWER EXTREMITY       Social History     Tobacco Use   • Smoking status: Former Smoker     Packs/day: 1.00     Types: Cigarettes     Start date: 1967     Quit date: 1977     Years since quittin.0   • Smokeless tobacco: Never Used   Substance Use Topics   • Alcohol use: Yes     Alcohol/week: 0.6 oz     Types: 1 Standard drinks or equivalent per week     Comment: nightly     No chief complaint on file.    Diagnosis: Osteomyelitis    Unit where seen by Wound Team: s [address]    WOUND CONSULT/FOLLOW UP RELATED TO:  Sacrum, left ankle, colostomy     WOUND HISTORY:  Patient has C5-C7 complete paraplegia since 2019.  Patient had developed decubitus ulcers that was flapped in 2019 by Dr. Moon, flap failed and pressure injury still present, and per patient, black foam still stuck in wound bed depth and tissue has grown around foam and is unable to be removed unless goes to surgery.     WOUND ASSESSMENT/LDA       Wound 21 Pressure Injury Sacrum (Active)   Wound Image      Site Assessment Red;Bleeding    Periwound Assessment Intact    Margins Unattached edges    Closure Secondary intention    Drainage Amount Small    Drainage Description Sanguineous    Treatments Site care;Offloading    Wound Cleansing Not Applicable    Periwound Protectant Not Applicable    Dressing Cleansing/Solutions Not Applicable    Dressing Options Hydrofiber Silver;Mepilex    Dressing Changed Changed    Dressing Status Intact    Dressing Change/Treatment Frequency Daily, and As Needed     NEXT Dressing Change/Treatment Date 01/27/22    NEXT Weekly Photo (Inpatient Only) 02/02/22    Wound Length (cm) 1 cm    Wound Width (cm) 3 cm    Wound Depth (cm) 1 cm    Wound Surface Area (cm^2) 3 cm^2    Wound Volume (cm^3) 3 cm^3    Wound Healing % -320    Undermining (cm) 1 cm    Undermining of Wound, 1st Location From 10 o'clock;To 1 o'clock    Shape oval    Wound Odor None    Pulses N/A    Exposed Structures None               Wound 01/26/22 Full Thickness Wound Ankle Left (Active)   Wound Image    01/26/22 1300   Site Assessment Red    Periwound Assessment Ecchymosis    Margins Unattached edges    Closure Approximated    Drainage Amount Small    Drainage Description Sanguineous    Treatments Site care    Wound Cleansing Normal Saline Irrigation    Periwound Protectant Not Applicable    Dressing Cleansing/Solutions Not Applicable    Dressing Options Hydrofiber Silver;Silicone Adhesive Foam    Dressing Changed Changed    Dressing Status Intact    Dressing Change/Treatment Frequency Daily, and As Needed    NEXT Dressing Change/Treatment Date 01/27/22    NEXT Weekly Photo (Inpatient Only) 02/02/22    Non-staged Wound Description Full thickness    Wound Length (cm) 3 cm    Wound Width (cm) 0.3 cm    Wound Depth (cm) 0.5 cm    Wound Surface Area (cm^2) 0.9 cm^2    Wound Volume (cm^3) 0.45 cm^3    Shape linear    Wound Odor None    Exposed Structures Sutures           Vascular:    JUAN MANUEL:   No results found.    Lab Values:    Lab Results   Component Value Date/Time    WBC 6.1 01/25/2022 04:59 AM    RBC 3.34 (L) 01/25/2022 04:59 AM    HEMOGLOBIN 11.6 (L) 01/25/2022 04:59 AM    HEMATOCRIT 34.5 (L) 01/25/2022 04:59 AM    CREACTPROT 1.32 (H) 01/21/2022 03:18 PM    SEDRATEWES 71 (H) 12/18/2020 02:25 PM        Culture Results show:  Recent Results (from the past 720 hour(s))   CULTURE WOUND W/ GRAM STAIN    Collection Time: 01/21/22  2:10 PM    Specimen: Left Leg; Wound   Result Value Ref Range    Significant Indicator  POS (POS)     Source WND     Site LEFT LEG     Culture Result - (A)     Gram Stain Result Rare Gram positive cocci.  Moderate WBCs.       Culture Result Proteus mirabilis ESBL  Moderate growth   (A)     Culture Result (A)      Methicillin Resistant Staphylococcus aureus  Moderate growth         Susceptibility    Methicillin resistant staphylococcus aureus - ROSE     Cefazolin >16 Resistant mcg/mL     Cefepime >16 Resistant mcg/mL     Ampicillin/sulbactam <=8/4 Resistant mcg/mL     Vancomycin 1 Sensitive mcg/mL     Trimeth/Sulfa <=0.5/9.5 Sensitive mcg/mL     Tetracycline <=4 Sensitive mcg/mL     Azithromycin >4 Resistant mcg/mL     Clindamycin >4 Resistant mcg/mL     Ceftaroline 1 Sensitive mcg/mL     Daptomycin <=0.5 Sensitive mcg/mL     Erythromycin >4 Resistant mcg/mL     Oxacillin >2 Resistant mcg/mL    Proteus mirabilis esbl - ROSE     Ampicillin >16 Resistant mcg/mL     Ceftriaxone >32 Resistant mcg/mL     Cefazolin >16 Resistant mcg/mL     Ciprofloxacin >2 Resistant mcg/mL     Cefepime >16 Resistant mcg/mL     Cefuroxime >16 Resistant mcg/mL     Ertapenem <=0.5 Sensitive mcg/mL     Gentamicin <=2 Sensitive mcg/mL     Minocycline 8 Intermediate mcg/mL     Ampicillin/sulbactam <=4/2 Sensitive mcg/mL     Moxifloxacin >4 Resistant mcg/mL     Pip/Tazobactam <=8 Sensitive mcg/mL     Trimeth/Sulfa >2/38 Resistant mcg/mL     Tobramycin <=2 Sensitive mcg/mL       Pain Level/Medicated:  No pain    INTERVENTIONS BY WOUND TEAM:  Chart and images reviewed. Discussed with bedside RN. All areas of concern (based on picture review, LDA review and discussion with bedside RN) have been thoroughly assessed. Documentation of areas based on significant findings. This RN in to assess patient. Performed standard wound care which includes appropriate positioning, dressing removal and non-selective debridement. Pictures and measurements obtained weekly if/when required.  SACRUM/ L ankle  Preparation for Dressing removal:  NA  Non-selectively Debrided with: NS and gauze.  Sharp debridement: NA  Shira wound:NS and gauze  Primary Dressing: aquacel ag  Secondary (Outer) Dressing: Mepilex-sacrum, adh foam- L ankle      Interdisciplinary consultation: Patient, Bedside RN, Danuta RN    EVALUATION / RATIONALE FOR TREATMENT:  Most Recent Date:  1/26: Left medial ankle approximated with sutures. Appears to have begun to dehisce. Pt stated he would undergo bone biopsy today, however does not appear to be scheduled. Sacral ulcer foam removed by Dr. Chaves 1/25. Structures do not appear to be exposed. Hydrofiber silver applied to manage bioburden, absorb exudate, and maintain a moist wound environment without laterally wicking exudate therefore reducing shira-wound maceration. Wound team will continue to follow.    1/22/22: patient with POA wounds. Colostomy established, all supplies ordered. Left ankle with full thickness wound, has had fracture in area, osteomyelitis is possible, xeroform at this time, patient going to OR with MD Stewart today. Patient sacrum with unstageable recurring pressure injury present, is chronic, and per patient, has foam embedded in the wound and the patient has been trying to get scheduled with plastics with no success outpatient, can see black foam in base, tissue grown around, otherwise wound is very small and surrounding tissue with scar tissue present. Requesting potential inpatient plastics consult. Aquacel Ag Hydrofiber applied to manage bioburden, absorb exudate, and maintain a moist wound environment without laterally wicking exudate therefore reducing shira-wound maceration with mepilex to offload.        Goals: Steady decrease in wound area and depth weekly.    WOUND TEAM PLAN OF CARE ([X] for frequency of wound follow up,):   Nursing to follow orders written for wound care. Contact wound team if area fails to progress, deteriorates or with any questions/concerns  Dressing changes by wound team:                    Follow up 3 times weekly:                NPWT change 3 times weekly:     Follow up 1-2 times weekly:  X nursing to perform dsg/ skin care; WT following  Follow up Bi-Monthly:                   Follow up as needed:     Other (explain):     NURSING PLAN OF CARE ORDERS (X):  Dressing changes: See Dressing Care orders: X  Skin care: See Skin Care orders: X  RN Prevention Protocol: X  Rectal tube care: See Rectal Tube Care orders:   Other orders:       Anticipated discharge plans: TBD-recommend HH or OP f/u for ongoing wound care.  LTACH:      SNF/Rehab:                  Home Health Care:       Outpatient Wound Center:          Self/Family Care:        Other:                  Vac Discharge Needs:   Not Applicable Pt not on a wound vac:  X     Regular Vac while inpatient, alternative dressing at DC:        Regular Vac in use and continued at DC:            Reg. Vac w/ Skin Sub/Biologic in use. Will need to be changed 2x wkly:      Veraflo Vac while inpatient, ok to transition to Regular Vac on Discharge:           Veraflo Vac while inpatient, will need to remain on Veraflo Vac upon discharge:
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
## 2022-01-26 NOTE — PROGRESS NOTES
Aaox4, denies any discomfort, had a long rough night as verbalized, BP elevated but normally runs high at this time, will recheck, POC discussed, IV antibiotics as ordered.

## 2022-01-26 NOTE — PROGRESS NOTES
Bedside report received and patient care assumed. Pt is resting in bed, A&Ox4, with no complaints of pain, and is on RA. Wound care complete on ankle, and patient has been educate on plan going forward.  All fall precautions are in place, belongings at bedside table.  Pt was updated on POC, no questions or concerns. Pt educated on use of call light for assistance. Will continue to monitor.

## 2022-01-26 NOTE — CARE PLAN
The patient is Stable - Low risk of patient condition declining or worsening    Shift Goals  Clinical Goals: wound care, q 2 turns, monitor BP  Patient Goals: rest  Family Goals: KEN    Progress made toward(s) clinical / shift goals:    Problem: Skin Integrity  Goal: Skin integrity is maintained or improved  Outcome: Progressing  Note: Appropriate interventions in place to protect skin. Pt on q2 turns,  with suprapubic catheter for urinary elimination. Bed changes PRN. Heels floated, extra pillows for positioning. Wound on board.       Problem: Fall Risk  Goal: Patient will remain free from falls  Outcome: Progressing  Note: Safety and fall education given. All fall precautions are in place with belongings at bedside table. Needs are tended to. Educated to use call light for assistance. Pt verbalized understanding.         Patient is not progressing towards the following goals:

## 2022-01-26 NOTE — PROGRESS NOTES
IR Nursing Note:    Order received for left ankle biopsy.  Per Dr. Medina this would be better achieved with ortho or podiatry.  MD notified.
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
## 2022-01-27 ENCOUNTER — ANESTHESIA (OUTPATIENT)
Dept: SURGERY | Facility: MEDICAL CENTER | Age: 72
DRG: 463 | End: 2022-01-27
Payer: MEDICARE

## 2022-01-27 ENCOUNTER — ANESTHESIA EVENT (OUTPATIENT)
Dept: SURGERY | Facility: MEDICAL CENTER | Age: 72
DRG: 463 | End: 2022-01-27
Payer: MEDICARE

## 2022-01-27 PROCEDURE — 500881 HCHG PACK, EXTREMITY: Performed by: ORTHOPAEDIC SURGERY

## 2022-01-27 PROCEDURE — 770001 HCHG ROOM/CARE - MED/SURG/GYN PRIV*

## 2022-01-27 PROCEDURE — 700102 HCHG RX REV CODE 250 W/ 637 OVERRIDE(OP): Performed by: STUDENT IN AN ORGANIZED HEALTH CARE EDUCATION/TRAINING PROGRAM

## 2022-01-27 PROCEDURE — A9270 NON-COVERED ITEM OR SERVICE: HCPCS | Performed by: INTERNAL MEDICINE

## 2022-01-27 PROCEDURE — 700102 HCHG RX REV CODE 250 W/ 637 OVERRIDE(OP): Performed by: INTERNAL MEDICINE

## 2022-01-27 PROCEDURE — 501838 HCHG SUTURE GENERAL: Performed by: ORTHOPAEDIC SURGERY

## 2022-01-27 PROCEDURE — 160027 HCHG SURGERY MINUTES - 1ST 30 MINS LEVEL 2: Performed by: ORTHOPAEDIC SURGERY

## 2022-01-27 PROCEDURE — 700111 HCHG RX REV CODE 636 W/ 250 OVERRIDE (IP): Performed by: HOSPITALIST

## 2022-01-27 PROCEDURE — 87205 SMEAR GRAM STAIN: CPT

## 2022-01-27 PROCEDURE — A6454 SELF-ADHER BAND W>=3" <5"/YD: HCPCS | Performed by: ORTHOPAEDIC SURGERY

## 2022-01-27 PROCEDURE — 160002 HCHG RECOVERY MINUTES (STAT): Performed by: ORTHOPAEDIC SURGERY

## 2022-01-27 PROCEDURE — 700101 HCHG RX REV CODE 250: Performed by: STUDENT IN AN ORGANIZED HEALTH CARE EDUCATION/TRAINING PROGRAM

## 2022-01-27 PROCEDURE — 87015 SPECIMEN INFECT AGNT CONCNTJ: CPT

## 2022-01-27 PROCEDURE — A9270 NON-COVERED ITEM OR SERVICE: HCPCS | Performed by: STUDENT IN AN ORGANIZED HEALTH CARE EDUCATION/TRAINING PROGRAM

## 2022-01-27 PROCEDURE — 160009 HCHG ANES TIME/MIN: Performed by: ORTHOPAEDIC SURGERY

## 2022-01-27 PROCEDURE — 20245 BONE BIOPSY OPEN DEEP: CPT | Mod: 78,LT | Performed by: ORTHOPAEDIC SURGERY

## 2022-01-27 PROCEDURE — 87075 CULTR BACTERIA EXCEPT BLOOD: CPT

## 2022-01-27 PROCEDURE — 700111 HCHG RX REV CODE 636 W/ 250 OVERRIDE (IP): Performed by: STUDENT IN AN ORGANIZED HEALTH CARE EDUCATION/TRAINING PROGRAM

## 2022-01-27 PROCEDURE — 700105 HCHG RX REV CODE 258: Performed by: HOSPITALIST

## 2022-01-27 PROCEDURE — 87186 SC STD MICRODIL/AGAR DIL: CPT

## 2022-01-27 PROCEDURE — 99232 SBSQ HOSP IP/OBS MODERATE 35: CPT | Performed by: HOSPITALIST

## 2022-01-27 PROCEDURE — 87077 CULTURE AEROBIC IDENTIFY: CPT | Mod: 91

## 2022-01-27 PROCEDURE — 160035 HCHG PACU - 1ST 60 MINS PHASE I: Performed by: ORTHOPAEDIC SURGERY

## 2022-01-27 PROCEDURE — 160048 HCHG OR STATISTICAL LEVEL 1-5: Performed by: ORTHOPAEDIC SURGERY

## 2022-01-27 PROCEDURE — 700105 HCHG RX REV CODE 258: Performed by: STUDENT IN AN ORGANIZED HEALTH CARE EDUCATION/TRAINING PROGRAM

## 2022-01-27 PROCEDURE — 0QBH0ZX EXCISION OF LEFT TIBIA, OPEN APPROACH, DIAGNOSTIC: ICD-10-PCS | Performed by: ORTHOPAEDIC SURGERY

## 2022-01-27 PROCEDURE — 87070 CULTURE OTHR SPECIMN AEROBIC: CPT

## 2022-01-27 PROCEDURE — 500367 HCHG DRAIN KIT, HEMOVAC: Performed by: ORTHOPAEDIC SURGERY

## 2022-01-27 RX ORDER — HYDRALAZINE HYDROCHLORIDE 20 MG/ML
5 INJECTION INTRAMUSCULAR; INTRAVENOUS
Status: DISCONTINUED | OUTPATIENT
Start: 2022-01-27 | End: 2022-01-27 | Stop reason: HOSPADM

## 2022-01-27 RX ORDER — OXYCODONE HCL 5 MG/5 ML
5 SOLUTION, ORAL ORAL
Status: DISCONTINUED | OUTPATIENT
Start: 2022-01-27 | End: 2022-01-27 | Stop reason: HOSPADM

## 2022-01-27 RX ORDER — DIPHENHYDRAMINE HYDROCHLORIDE 50 MG/ML
12.5 INJECTION INTRAMUSCULAR; INTRAVENOUS
Status: DISCONTINUED | OUTPATIENT
Start: 2022-01-27 | End: 2022-01-27 | Stop reason: HOSPADM

## 2022-01-27 RX ORDER — MIDAZOLAM HYDROCHLORIDE 1 MG/ML
INJECTION INTRAMUSCULAR; INTRAVENOUS PRN
Status: DISCONTINUED | OUTPATIENT
Start: 2022-01-27 | End: 2022-01-27 | Stop reason: SURG

## 2022-01-27 RX ORDER — SODIUM CHLORIDE, SODIUM LACTATE, POTASSIUM CHLORIDE, CALCIUM CHLORIDE 600; 310; 30; 20 MG/100ML; MG/100ML; MG/100ML; MG/100ML
INJECTION, SOLUTION INTRAVENOUS CONTINUOUS
Status: DISCONTINUED | OUTPATIENT
Start: 2022-01-27 | End: 2022-01-27 | Stop reason: HOSPADM

## 2022-01-27 RX ORDER — HYDROMORPHONE HYDROCHLORIDE 1 MG/ML
0.2 INJECTION, SOLUTION INTRAMUSCULAR; INTRAVENOUS; SUBCUTANEOUS
Status: DISCONTINUED | OUTPATIENT
Start: 2022-01-27 | End: 2022-01-27 | Stop reason: HOSPADM

## 2022-01-27 RX ORDER — HYDROMORPHONE HYDROCHLORIDE 1 MG/ML
0.1 INJECTION, SOLUTION INTRAMUSCULAR; INTRAVENOUS; SUBCUTANEOUS
Status: DISCONTINUED | OUTPATIENT
Start: 2022-01-27 | End: 2022-01-27 | Stop reason: HOSPADM

## 2022-01-27 RX ORDER — HYDROMORPHONE HYDROCHLORIDE 1 MG/ML
0.4 INJECTION, SOLUTION INTRAMUSCULAR; INTRAVENOUS; SUBCUTANEOUS
Status: DISCONTINUED | OUTPATIENT
Start: 2022-01-27 | End: 2022-01-27 | Stop reason: HOSPADM

## 2022-01-27 RX ORDER — OXYCODONE HCL 5 MG/5 ML
10 SOLUTION, ORAL ORAL
Status: DISCONTINUED | OUTPATIENT
Start: 2022-01-27 | End: 2022-01-27 | Stop reason: HOSPADM

## 2022-01-27 RX ORDER — MEPERIDINE HYDROCHLORIDE 25 MG/ML
12.5 INJECTION INTRAMUSCULAR; INTRAVENOUS; SUBCUTANEOUS
Status: DISCONTINUED | OUTPATIENT
Start: 2022-01-27 | End: 2022-01-27 | Stop reason: HOSPADM

## 2022-01-27 RX ORDER — ONDANSETRON 2 MG/ML
4 INJECTION INTRAMUSCULAR; INTRAVENOUS
Status: DISCONTINUED | OUTPATIENT
Start: 2022-01-27 | End: 2022-01-27 | Stop reason: HOSPADM

## 2022-01-27 RX ORDER — HALOPERIDOL 5 MG/ML
1 INJECTION INTRAMUSCULAR
Status: DISCONTINUED | OUTPATIENT
Start: 2022-01-27 | End: 2022-01-27 | Stop reason: HOSPADM

## 2022-01-27 RX ORDER — GLYCOPYRROLATE 0.2 MG/ML
INJECTION INTRAMUSCULAR; INTRAVENOUS PRN
Status: DISCONTINUED | OUTPATIENT
Start: 2022-01-27 | End: 2022-01-27 | Stop reason: SURG

## 2022-01-27 RX ORDER — CEFAZOLIN SODIUM 1 G/3ML
INJECTION, POWDER, FOR SOLUTION INTRAMUSCULAR; INTRAVENOUS PRN
Status: DISCONTINUED | OUTPATIENT
Start: 2022-01-27 | End: 2022-01-27 | Stop reason: SURG

## 2022-01-27 RX ORDER — HYDRALAZINE HYDROCHLORIDE 20 MG/ML
INJECTION INTRAMUSCULAR; INTRAVENOUS PRN
Status: DISCONTINUED | OUTPATIENT
Start: 2022-01-27 | End: 2022-01-27 | Stop reason: SURG

## 2022-01-27 RX ADMIN — ACETAMINOPHEN 650 MG: 325 TABLET, FILM COATED ORAL at 18:25

## 2022-01-27 RX ADMIN — PIPERACILLIN AND TAZOBACTAM 4.5 G: 4; .5 INJECTION, POWDER, LYOPHILIZED, FOR SOLUTION INTRAVENOUS; PARENTERAL at 15:32

## 2022-01-27 RX ADMIN — OXYCODONE HYDROCHLORIDE AND ACETAMINOPHEN 1000 MG: 500 TABLET ORAL at 05:55

## 2022-01-27 RX ADMIN — LOSARTAN POTASSIUM 50 MG: 50 TABLET, FILM COATED ORAL at 22:24

## 2022-01-27 RX ADMIN — PIPERACILLIN AND TAZOBACTAM 4.5 G: 4; .5 INJECTION, POWDER, LYOPHILIZED, FOR SOLUTION INTRAVENOUS; PARENTERAL at 05:55

## 2022-01-27 RX ADMIN — CEFAZOLIN 2 G: 330 INJECTION, POWDER, FOR SOLUTION INTRAMUSCULAR; INTRAVENOUS at 09:23

## 2022-01-27 RX ADMIN — FERROUS SULFATE TAB 325 MG (65 MG ELEMENTAL FE) 325 MG: 325 (65 FE) TAB at 16:52

## 2022-01-27 RX ADMIN — MIDAZOLAM HYDROCHLORIDE 1 MG: 1 INJECTION, SOLUTION INTRAMUSCULAR; INTRAVENOUS at 09:13

## 2022-01-27 RX ADMIN — BACLOFEN 20 MG: 10 TABLET ORAL at 22:24

## 2022-01-27 RX ADMIN — Medication 400 MG: at 05:56

## 2022-01-27 RX ADMIN — Medication 2000 UNITS: at 05:56

## 2022-01-27 RX ADMIN — DOXYCYCLINE 100 MG: 100 TABLET, FILM COATED ORAL at 05:56

## 2022-01-27 RX ADMIN — DOXYCYCLINE 100 MG: 100 TABLET, FILM COATED ORAL at 16:52

## 2022-01-27 RX ADMIN — FERROUS SULFATE TAB 325 MG (65 MG ELEMENTAL FE) 325 MG: 325 (65 FE) TAB at 05:56

## 2022-01-27 RX ADMIN — Medication 10 MG: at 22:23

## 2022-01-27 RX ADMIN — GLYCOPYRROLATE 0.2 MG: 0.2 INJECTION INTRAMUSCULAR; INTRAVENOUS at 09:12

## 2022-01-27 RX ADMIN — HYDRALAZINE HYDROCHLORIDE 10 MG: 20 INJECTION INTRAMUSCULAR; INTRAVENOUS at 09:21

## 2022-01-27 RX ADMIN — BACLOFEN 20 MG: 10 TABLET ORAL at 11:15

## 2022-01-27 RX ADMIN — PIPERACILLIN AND TAZOBACTAM 4.5 G: 4; .5 INJECTION, POWDER, LYOPHILIZED, FOR SOLUTION INTRAVENOUS; PARENTERAL at 22:24

## 2022-01-27 RX ADMIN — POLYETHYLENE GLYCOL 3350 1 PACKET: 17 POWDER, FOR SOLUTION ORAL at 16:56

## 2022-01-27 RX ADMIN — BACLOFEN 20 MG: 10 TABLET ORAL at 16:52

## 2022-01-27 ASSESSMENT — ENCOUNTER SYMPTOMS
NERVOUS/ANXIOUS: 0
WEAKNESS: 1
FEVER: 0
COUGH: 0
MYALGIAS: 0
DIARRHEA: 0
VOMITING: 0
NAUSEA: 0
ORTHOPNEA: 0
HEMOPTYSIS: 0
DIZZINESS: 0
FOCAL WEAKNESS: 0
BLURRED VISION: 0

## 2022-01-27 ASSESSMENT — PAIN DESCRIPTION - PAIN TYPE
TYPE: ACUTE PAIN
TYPE: SURGICAL PAIN

## 2022-01-27 NOTE — PROGRESS NOTES
0929- Patient arrived in PACU and was connected to monitors. Vital signs are stable, patient is on 4L mask, unlabored breathing. Right ankle dressing is clean, dry and intact. Right foot is warm and pulses are present. Patient is waking up and denies pain or nausea at this time.    0950-called patient's daughter, Geovany, to give updates. No answer, left a voicemail.     1000- Patient is waking up more and reports no pain. Vital signs are stable. Patient is on room air.     1005-Called ZAIRA Womack with report, all questions answered.     1015- Patient transported to [facility].

## 2022-01-27 NOTE — PROGRESS NOTES
Bedside report received and patient care assumed. Pt is resting in bed, A&Ox4, with no complaints of pain, and is on RA. Cazares bag emptied, and colostomy bag emptied. Patient agreed to nigh time BP medication, but wants to re-evaluate morning BP meds after BP check and possibly move amlodipine to later in the morning. All fall precautions are in place, belongings at bedside table.  Pt was updated on POC, no questions or concerns. Pt educated on use of call light for assistance. Will continue to monitor.

## 2022-01-27 NOTE — OP REPORT
DATE: 1/27/2022    PREOPERATIVE DIAGNOSIS: Left tibia suspected osteomyelitis    POSTOPERATIVE DIAGNOSIS: Same    PROCEDURE PERFORMED: Bone biopsy left tibia                                                      SURGEON: Erasmo Stewart M.D.     ASSISTANT: Rolando Rashid PA-C    ANESTHESIOLOGIST: Hermilo Goddard MD    ANESTHESIA: General    ESTIMATED BLOOD LOSS: 0 mL    INDICATIONS: The patient is a 71 y.o. male with a left medial ankle open wound over suspected osteomyelitic bone.  I discussed the risks and benefits of the procedure, including the risks of infection, wound healing complication, neurovascular injury, need for repeat irrigation and debridement and the medical risks of anesthesia including DVT, PE, MI, stroke, and death.  Benefits include eradication of infection.  Alternatives to surgery were also discussed, including non-operative management.  The patient signed the informed consent and the operative extremity was marked.      PROCEDURE:  The patient underwent anesthesia, and was positioned supine on the operating room table and all bony prominences were well padded.  Preoperative antibiotics were held until cultures could be obtained. Sequential compression devices were employed. The correct operative site was prepped and draped into a sterile field. A procedural pause was conducted to verify correct patient, correct extremity, presence of the surgeons initials on the operative extremity.    His previous incision was reopened and a bone biopsy of the tibia was performed with a curette and rongeur.  This was sent to the lab for analysis.  The wound was irrigated with normal saline solution. The wound was closed with 2-0 Nylon in the skin.  Sterile dressings were applied.     The patient tolerated the procedure well. There were no apparent complications. All sponge, needle, and instrument counts were correct on two separate occasions. She was awakened, extubated, and transferred to the recovery room  in satisfactory condition.     The use of Rolando Rashid PA-C as a surgical assistant was necessary for assistance with exposure, retraction, and closure.    Post-Operative Plan:    1.  The patient should remain full weightbearing through heel  on their operative extremity.  Gait aids (crutch or crutches, cane, walker) may be used as needed, and may be discontinued when no longer required.  2.  IV antibiotics - will be given postoperatively and adjusted by the Infectious Disease service as dictated by culture results.  3.  DVT prophylaxis - SCD's and Lovenox 40 mg SQ daily while inpatient.  The patient may transition to Aspirin 325 mg PO BID as an outpatient  4.  Discharge planning   ____________________________________   Erasmo Stewart M.D.   DD: 1/27/2022  9:28 AM
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
## 2022-01-27 NOTE — PROGRESS NOTES
Patient requests if planning to go to SNF to reach out for referral from [facility]. Otherwise does not want to go to any other SNF.

## 2022-01-27 NOTE — PROGRESS NOTES
Assumed care of pt. Bedside report received from Julio Cesar MENESES. Pt was updated on plan of care. Call light, phone and personal belongings in reach. Bed alarm on and working properly, bed in lowest position, and locked.

## 2022-01-27 NOTE — DISCHARGE INSTRUCTIONS
Discharge Instructions    Discharged to home by car with relative. Discharged via wheelchair, hospital escort: Yes.  Special equipment needed: Not Applicable    Be sure to schedule a follow-up appointment with your primary care doctor or any specialists as instructed.     Discharge Plan:   Influenza Vaccine Indication: Not indicated: Previously immunized this influenza season and > 8 years of age    I understand that a diet low in cholesterol, fat, and sodium is recommended for good health. Unless I have been given specific instructions below for another diet, I accept this instruction as my diet prescription.   Other diet: Regular    Special Instructions: None    · Is patient discharged on Warfarin / Coumadin?   No     Depression / Suicide Risk    As you are discharged from this [facility] facility, it is important to learn how to keep safe from harming yourself.    Recognize the warning signs:  · Abrupt changes in personality, positive or negative- including increase in energy   · Giving away possessions  · Change in eating patterns- significant weight changes-  positive or negative  · Change in sleeping patterns- unable to sleep or sleeping all the time   · Unwillingness or inability to communicate  · Depression  · Unusual sadness, discouragement and loneliness  · Talk of wanting to die  · Neglect of personal appearance   · Rebelliousness- reckless behavior  · Withdrawal from people/activities they love  · Confusion- inability to concentrate     If you or a loved one observes any of these behaviors or has concerns about self-harm, here's what you can do:  · Talk about it- your feelings and reasons for harming yourself  · Remove any means that you might use to hurt yourself (examples: pills, rope, extension cords, firearm)  · Get professional help from the community (Mental Health, Substance Abuse, psychological counseling)  · Do not be alone:Call your Safe Contact- someone whom you trust who will be there for  you.  · Call your local CRISIS HOTLINE 740-3347 or 491-972-4849  · Call your local Children's Mobile Crisis Response Team [place] (918) 232-8290 or www.[employer]  · Call the toll free National Suicide Prevention Hotlines   · National Suicide Prevention Lifeline 098-962-CAJK (6773)  · National [employer] 800-SUICIDE (462-2331)

## 2022-01-27 NOTE — PROGRESS NOTES
"   Orthopaedic Progress Note    Interval changes:  Patient doing well  To OR tomorrow for bone biopsy  NPO after midnight    ROS - Patient denies any new issues.  Pain well controlled.    /74   Pulse 71   Temp 36.3 °C (97.4 °F) (Temporal)   Resp 18   Ht 1.778 m (5' 10\")   Wt 105 kg (231 lb 11.3 oz)   SpO2 95%       Patient seen and examined  No acute distress  Breathing non labored  RRR  LLE dressing CDI, DNVI, moves all toes, cap refill <2 sec.       Recent Labs     01/25/22  0459   WBC 6.1   RBC 3.34*   HEMOGLOBIN 11.6*   HEMATOCRIT 34.5*   .3*   MCH 34.7*   MCHC 33.6*   RDW 53.1*   PLATELETCT 151*   MPV 9.2       Active Hospital Problems    Diagnosis    • Essential hypertension [I10]      Priority: Medium   • Osteomyelitis of left ankle ([facility]) [M86.9]    • Quadriplegia, C5-C7 complete ([facility]) [G82.53]    • Pressure injury of sacral region, stage 4 ([facility]) [L89.154]        Assessment/Plan:  Patient doing well  To OR tomorrow for bone biopsy  NPO after midnight  POD#4 S/P Irrigation and debridement of skin, subcutaneous tissue, underlying muscle and bone, left leg wound.  Wt bearing status - WBAT  Wound care/Drains - Dressings to be changed every other day by nursing  Future Procedures - tomorrow  Lovenox: Start 1/23, Duration-until ambulatory > 150'  Sutures/Staples out- 14 days post operatively  PT/OT-initiated  Antibiotics: adoxa 100mg poi BID, zosyn 4.5 g IV Q8  DVT Prophylaxis- TEDS/SCDs/Foot pumps  Cazares-none  Case Coordination for Discharge Planning - Disposition pending    "

## 2022-01-27 NOTE — PROGRESS NOTES
Pt being transported down to surgery via transport team, CHG bath complete, report given this morning with nightshift nurse.

## 2022-01-27 NOTE — CARE PLAN
The patient is Stable - Low risk of patient condition declining or worsening    Shift Goals  Clinical Goals: q 2 turns, monitor BP, wound care PRN, NPO  Patient Goals: rest  Family Goals: KEN    Progress made toward(s) clinical / shift goals:    Problem: Knowledge Deficit - Standard  Goal: Patient and family/care givers will demonstrate understanding of plan of care, disease process/condition, diagnostic tests and medications  Outcome: Progressing     Problem: Skin Integrity  Goal: Skin integrity is maintained or improved  Outcome: Progressing  Note: Appropriate interventions in place to protect skin. Pt on q2 turns,  with suprapubic catheter for urinary elimination. Bed changes PRN. Heels floated, extra pillows for positioning. Wound on board.       Problem: Fall Risk  Goal: Patient will remain free from falls  Outcome: Progressing     Problem: Communication  Goal: The ability to communicate needs accurately and effectively will improve  Outcome: Progressing  Note: Pt actively participates in POC. Pt and board updated, all questions and concerns answered.         Patient is not progressing towards the following goals:

## 2022-01-27 NOTE — ANESTHESIA POSTPROCEDURE EVALUATION
Patient: Osvaldo Pate    Procedure Summary     Date: 01/27/22 Room / Location: [place] [id number] / SURGERY [facility]    Anesthesia Start: 0904 Anesthesia Stop: 0931    Procedures:       INCISION AND DRAINAGE, WOUND, BY ORTHOPEDICS (Left Leg Lower)      BIOPSY, BONE (Left Leg Lower) Diagnosis: (OSTEOMYELITIS LEFT ANKLE)    Surgeons: Erasmo Stewart M.D. Responsible Provider: Hermilo Goddard M.D.    Anesthesia Type: MAC ASA Status: 3          Final Anesthesia Type: MAC  Last vitals  BP   Blood Pressure : 132/51    Temp   36.3 °C (97.3 °F)    Pulse   (!) 48   Resp   18    SpO2   95 %      Anesthesia Post Evaluation    Patient location during evaluation: PACU  Patient participation: complete - patient participated  Level of consciousness: awake and alert    Airway patency: patent  Anesthetic complications: no  Cardiovascular status: hemodynamically stable  Respiratory status: acceptable  Hydration status: euvolemic    PONV: none          No complications documented.     Nurse Pain Score: 0 (NPRS)

## 2022-01-28 LAB
GRAM STN SPEC: NORMAL
SIGNIFICANT IND 70042: NORMAL
SITE SITE: NORMAL
SOURCE SOURCE: NORMAL

## 2022-01-28 PROCEDURE — 700105 HCHG RX REV CODE 258: Performed by: HOSPITALIST

## 2022-01-28 PROCEDURE — 700111 HCHG RX REV CODE 636 W/ 250 OVERRIDE (IP): Performed by: HOSPITALIST

## 2022-01-28 PROCEDURE — 700102 HCHG RX REV CODE 250 W/ 637 OVERRIDE(OP): Performed by: STUDENT IN AN ORGANIZED HEALTH CARE EDUCATION/TRAINING PROGRAM

## 2022-01-28 PROCEDURE — 99232 SBSQ HOSP IP/OBS MODERATE 35: CPT | Performed by: HOSPITALIST

## 2022-01-28 PROCEDURE — 99024 POSTOP FOLLOW-UP VISIT: CPT | Performed by: ORTHOPAEDIC SURGERY

## 2022-01-28 PROCEDURE — 700102 HCHG RX REV CODE 250 W/ 637 OVERRIDE(OP): Performed by: INTERNAL MEDICINE

## 2022-01-28 PROCEDURE — A9270 NON-COVERED ITEM OR SERVICE: HCPCS | Performed by: STUDENT IN AN ORGANIZED HEALTH CARE EDUCATION/TRAINING PROGRAM

## 2022-01-28 PROCEDURE — 99232 SBSQ HOSP IP/OBS MODERATE 35: CPT | Performed by: INTERNAL MEDICINE

## 2022-01-28 PROCEDURE — 770001 HCHG ROOM/CARE - MED/SURG/GYN PRIV*

## 2022-01-28 PROCEDURE — 700102 HCHG RX REV CODE 250 W/ 637 OVERRIDE(OP): Performed by: HOSPITALIST

## 2022-01-28 PROCEDURE — A9270 NON-COVERED ITEM OR SERVICE: HCPCS | Performed by: HOSPITALIST

## 2022-01-28 PROCEDURE — A9270 NON-COVERED ITEM OR SERVICE: HCPCS | Performed by: INTERNAL MEDICINE

## 2022-01-28 PROCEDURE — 700111 HCHG RX REV CODE 636 W/ 250 OVERRIDE (IP): Performed by: STUDENT IN AN ORGANIZED HEALTH CARE EDUCATION/TRAINING PROGRAM

## 2022-01-28 RX ORDER — POLYETHYLENE GLYCOL 3350 17 G/17G
1 POWDER, FOR SOLUTION ORAL
Status: DISCONTINUED | OUTPATIENT
Start: 2022-01-28 | End: 2022-01-31 | Stop reason: HOSPADM

## 2022-01-28 RX ORDER — DOCUSATE SODIUM 100 MG/1
200 CAPSULE, LIQUID FILLED ORAL 2 TIMES DAILY
Status: DISCONTINUED | OUTPATIENT
Start: 2022-01-28 | End: 2022-01-31 | Stop reason: HOSPADM

## 2022-01-28 RX ORDER — SENNOSIDES A AND B 8.6 MG/1
17.2 TABLET, FILM COATED ORAL 2 TIMES DAILY
Status: DISCONTINUED | OUTPATIENT
Start: 2022-01-28 | End: 2022-01-31 | Stop reason: HOSPADM

## 2022-01-28 RX ADMIN — Medication 400 MG: at 04:26

## 2022-01-28 RX ADMIN — FERROUS SULFATE TAB 325 MG (65 MG ELEMENTAL FE) 325 MG: 325 (65 FE) TAB at 04:26

## 2022-01-28 RX ADMIN — SENNOSIDES 17.2 MG: 8.6 TABLET, FILM COATED ORAL at 21:40

## 2022-01-28 RX ADMIN — DOCUSATE SODIUM 200 MG: 100 CAPSULE, LIQUID FILLED ORAL at 10:29

## 2022-01-28 RX ADMIN — Medication 2000 UNITS: at 04:26

## 2022-01-28 RX ADMIN — BACLOFEN 20 MG: 10 TABLET ORAL at 18:49

## 2022-01-28 RX ADMIN — DOXYCYCLINE 100 MG: 100 TABLET, FILM COATED ORAL at 18:50

## 2022-01-28 RX ADMIN — LOSARTAN POTASSIUM 50 MG: 50 TABLET, FILM COATED ORAL at 21:40

## 2022-01-28 RX ADMIN — FERROUS SULFATE TAB 325 MG (65 MG ELEMENTAL FE) 325 MG: 325 (65 FE) TAB at 18:50

## 2022-01-28 RX ADMIN — PIPERACILLIN AND TAZOBACTAM 4.5 G: 4; .5 INJECTION, POWDER, LYOPHILIZED, FOR SOLUTION INTRAVENOUS; PARENTERAL at 15:06

## 2022-01-28 RX ADMIN — SENNOSIDES 17.2 MG: 8.6 TABLET, FILM COATED ORAL at 10:29

## 2022-01-28 RX ADMIN — SODIUM CHLORIDE, POTASSIUM CHLORIDE, SODIUM LACTATE AND CALCIUM CHLORIDE: 600; 310; 30; 20 INJECTION, SOLUTION INTRAVENOUS at 04:54

## 2022-01-28 RX ADMIN — DOCUSATE SODIUM 200 MG: 100 CAPSULE, LIQUID FILLED ORAL at 21:40

## 2022-01-28 RX ADMIN — Medication 10 MG: at 21:40

## 2022-01-28 RX ADMIN — OXYCODONE HYDROCHLORIDE AND ACETAMINOPHEN 1000 MG: 500 TABLET ORAL at 04:26

## 2022-01-28 RX ADMIN — PIPERACILLIN AND TAZOBACTAM 4.5 G: 4; .5 INJECTION, POWDER, LYOPHILIZED, FOR SOLUTION INTRAVENOUS; PARENTERAL at 21:39

## 2022-01-28 RX ADMIN — ENOXAPARIN SODIUM 40 MG: 40 INJECTION SUBCUTANEOUS at 04:26

## 2022-01-28 RX ADMIN — DOXYCYCLINE 100 MG: 100 TABLET, FILM COATED ORAL at 04:26

## 2022-01-28 RX ADMIN — BACLOFEN 20 MG: 10 TABLET ORAL at 21:40

## 2022-01-28 RX ADMIN — PIPERACILLIN AND TAZOBACTAM 4.5 G: 4; .5 INJECTION, POWDER, LYOPHILIZED, FOR SOLUTION INTRAVENOUS; PARENTERAL at 04:25

## 2022-01-28 RX ADMIN — BACLOFEN 20 MG: 10 TABLET ORAL at 15:06

## 2022-01-28 ASSESSMENT — ENCOUNTER SYMPTOMS
SHORTNESS OF BREATH: 0
HEMOPTYSIS: 0
WEAKNESS: 1
FOCAL WEAKNESS: 1
FOCAL WEAKNESS: 0
MYALGIAS: 0
SENSORY CHANGE: 1
NAUSEA: 0
BLURRED VISION: 0
DIZZINESS: 0
FEVER: 0
COUGH: 0
NERVOUS/ANXIOUS: 0
VOMITING: 0

## 2022-01-28 NOTE — DOCUMENTATION QUERY
"                                                                         [facility]                                                                       Query Response Note      PATIENT:               NICHOLAS CASEY  ACCT #:                  [id number]  MRN:                     [id number]  :                      1950  ADMIT DATE:       2022 7:37 PM  DISCH DATE:          RESPONDING  PROVIDER #:        [id number]           QUERY TEXT:    Debridement is documented in the Medical Record. Please specify the type.      NOTE:  If an appropriate response is not listed below, please respond with a new note.    If you have any questions please contact:  Yumiko Roman RN CDI [facility]  [email or web address]  Yumiko Roman [email or web address]      The patient's Clinical Indicators include:  71 year old male admitted with chronic nonhealing left leg wound..       Terry \"PROCEDURE:    Irrigation and debridement of skin, subcutaneous tissue,  underlying muscle and bone, left leg wound.\"   \"  His 2x3 cm wound was debrided of a large amount of  heterotopic bone and all edges were debrided back to healthy tissue. \"    Risk factors:  osteomyelitis, exposed bone  Treatment: Debridement, antibiotics  Options provided:   -- Non-excisional debridement   -- Excisional debridement, Please specify the following details- Deepest level of debridement treated, instrument used, nature of tissue, appearance/size of wound, and technique   -- Unable to determine      Query created by: Yumiko Roman on 2022 12:20 PM    RESPONSE TEXT:    excisional debridement- bone          Electronically signed by:  MADI VARGAS MD 2022 7:44 AM              "

## 2022-01-28 NOTE — PROGRESS NOTES
Infectious Disease Progress Note    Author: Rosmery Crabtree M.D. Date & Time of service: 2022  12:38 PM    Chief Complaint:  Clinical OM    Interval History:  71 y.o. male admitted 2022.  Patient with known C5-7 paraplegia, neurogenic bladder with suprapubic catheter, history of stage IV sacral decubitus ulcer complicated by sacral osteomyelitis with abscess, completed therapy in , decubitus ulcers, recent admission in 2021 with ureteral stone and hydronephrosis, left ureteral stent placed, with plan for lithotripsy later.  Patient presented to the ER on  with nonhealing wound over the left ankle with heterotopic bone   AF tolerating abx well-he states waiting to hear if plan for VAC or skin graft-also wants to know if there is a plan for bone biopsy   AF WBC 6.1  no acute events overnight   AF Plastics eval appreciated-no need for additional surgery No new complaints   AF no CBC s/p bone biopsy yesterday Tolerating doxy well    Labs Reviewed, Medications Reviewed and Wound Reviewed.    Review of Systems:  Review of Systems   Constitutional: Negative for fever.   Respiratory: Negative for cough and shortness of breath.    Cardiovascular: Negative for chest pain.   Gastrointestinal: Negative for nausea and vomiting.   Skin: Negative for rash.   Neurological: Positive for sensory change and focal weakness.   All other systems reviewed and are negative.      Hemodynamics:  Temp (24hrs), Av.1 °C (96.9 °F), Min:35.9 °C (96.7 °F), Max:36.1 °C (97 °F)  Temperature: 36.1 °C (97 °F)  Pulse  Av.8  Min: 41  Max: 84   Blood Pressure : (!) 172/70 (Informed RN)       Physical Exam:  Physical Exam  Vitals and nursing note reviewed.   Constitutional:       General: He is not in acute distress.     Appearance: He is not ill-appearing, toxic-appearing or diaphoretic.   HENT:      Nose: No congestion or rhinorrhea.   Eyes:      General: No scleral icterus.     Extraocular  Movements: Extraocular movements intact.      Pupils: Pupils are equal, round, and reactive to light.   Cardiovascular:      Rate and Rhythm: Normal rate.   Pulmonary:      Effort: Pulmonary effort is normal. No respiratory distress.      Breath sounds: No stridor. No wheezing.   Abdominal:      General: There is no distension.      Tenderness: There is no abdominal tenderness.   Musculoskeletal:      Comments: Foot dressed   Skin:     Coloration: Skin is not jaundiced.      Findings: Bruising present.   Neurological:      Mental Status: He is alert.      Motor: Weakness present.      Comments: quadriplegic         Meds:    Current Facility-Administered Medications:   •  polyethylene glycol/lytes  •  sennosides  •  docusate sodium  •  piperacillin-tazobactam  •  amLODIPine  •  doxycycline monohydrate  •  ferrous sulfate  •  ascorbic acid  •  vitamin D3  •  enoxaparin (LOVENOX) injection  •  melatonin  •  oxyCODONE immediate-release  •  magnesium oxide  •  baclofen  •  losartan  •  acetaminophen  •  LR  •  ondansetron  •  ondansetron  •  labetalol  •  Notify provider if pain remains uncontrolled **AND** Use the Numeric Rating Scale (NRS), Garcia-Baker Faces (WBF), or FLACC on regular floors and Critical-Care Pain Observation Tool (CPOT) on ICUs/Trauma to assess pain **AND** Pulse Ox **AND** Pharmacy Consult Request **AND** If patient difficult to arouse and/or has respiratory depression (respiratory rate of 10 or less), stop any opiates that are currently infusing and call a Rapid Response.  •  [DISCONTINUED] oxyCODONE immediate-release **OR** oxyCODONE immediate-release **OR** HYDROmorphone    Labs:  No results for input(s): WBC, RBC, HEMOGLOBIN, HEMATOCRIT, MCV, MCH, RDW, PLATELETCT, MPV, NEUTSPOLYS, LYMPHOCYTES, MONOCYTES, EOSINOPHILS, BASOPHILS, RBCMORPHOLO in the last 72 hours.  No results for input(s): SODIUM, POTASSIUM, CHLORIDE, CO2, GLUCOSE, BUN, CPKTOTAL in the last 72 hours.  No results for input(s):  ALBUMIN, TBILIRUBIN, ALKPHOSPHAT, TOTPROTEIN, ALTSGPT, ASTSGOT, CREATININE in the last 72 hours.    Imaging:  DX-ANKLE 2- VIEWS LEFT    Result Date: 1/21/2022 1/21/2022 2:58 PM HISTORY/REASON FOR EXAM:  soft tissue wound ? Exposed bone osteo etc. Left ankle wound bone exposed TECHNIQUE/EXAM DESCRIPTION AND NUMBER OF VIEWS:  2 views of the LEFT ankle. COMPARISON: None. FINDINGS: Healed fracture deformity of the distal tibia with extensive surrounding heterotopic ossification. There is heterogeneous appearance of the cortex along the anterior aspect of the tibia Healed fracture deformity of the distal fibula. No joint arthropathy.     Healed fracture deformity of the distal tibia with extensive surrounding heterotopic ossification. Healed fracture deformity of the distal fibula. There is heterogeneous appearance of the cortex along the anterior aspect of the tibia. Osteomyelitis is possible. Further evaluation with MRI can be helpful    DX-CYSTO FLUORO > 1 HOUR    Result Date: 1/4/2022 1/4/2022 6:02 PM HISTORY/REASON FOR EXAM:  Left ureteral stent placement. TECHNIQUE/EXAM DESCRIPTION AND NUMBER OF VIEWS: Cysto fluoroscopy for greater than one hour. FINDINGS: Cysto fluoroscopy was utilized for greater than one hour. Actual fluoro time was 19 seconds. Single frontal view was obtained by the urologist which demonstrates a left ureteral stent.     Cysto fluoroscopy utilized for 19 seconds by the urologist. INTERPRETING LOCATION: [address] [place] [address], [facility]      Micro:  Results     Procedure Component Value Units Date/Time    GRAM STAIN [[id number]] Collected: 01/27/22 0816    Order Status: Completed Specimen: Tissue Updated: 01/28/22 0636     Significant Indicator .     Source TISS     Site Left Tibia     Gram Stain Result No organisms seen.    Narrative:      Surgery Specimen    CULTURE TISSUE W/ GRM STAIN [[id number]] Collected: 01/27/22 0816    Order Status: Completed Specimen: Tissue Updated: 01/28/22 0695      Significant Indicator NEG     Source TISS     Site Left Tibia     Culture Result -     Gram Stain Result No organisms seen.    Narrative:      Surgery Specimen    Anaerobic Culture [[id number]] Collected: 01/27/22 0816    Order Status: Completed Specimen: Tissue Updated: 01/28/22 0635     Significant Indicator NEG     Source TISS     Site Left Tibia     Culture Result -    Narrative:      Surgery Specimen    CULTURE WOUND W/ GRAM STAIN [[id number]]     Order Status: No result Specimen: Wound from Left Leg           Assessment:  Active Hospital Problems    Diagnosis    • Osteomyelitis of left ankle ([facility]) [M86.9]    • Quadriplegia, C5-C7 complete ([facility]) [G82.53]    • Pressure injury of sacral region, stage 4 ([facility]) [L89.154]    • Essential hypertension [I10]    Left leg osteomyelitis- given chronic wound with exposed bone and positive wound swab, at risk for osteomyelitis   Polymicrobial infection-MRSA ESBL Proteus  C5-C7 paraplegia  Chronic decubitus ulcers incl sacaral  History of sacral osteomyelitis with abscess  S/p OR 2x3 cm wound was debrided of a large amount of   heterotopic bone and all edges were debrided back to healthy tissue  Allergic to sulfa    Plan:  Wound swab (unreliable determinant of OM. Also poor indicator of pathogen if OM present except for MRSA)  Continue doxy. Anticipate 4-6 week course  Continue Zosyn for now-anticipate 2 week course unless GNR found on bone biopsy. Anticipate stop date 2/4/2022  Bone biopsy neg at 24 hours neg (done on antibiotics)  Wound care  Final antibiotic recommendations per culture results and clinical course
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
## 2022-01-28 NOTE — CARE PLAN
The patient is Stable - Low risk of patient condition declining or worsening    Shift Goals  Clinical Goals: q 2 turns, monitor BP, wound care PRN, NPO  Patient Goals: rest  Family Goals: KEN    Progress made toward(s) clinical / shift goals:    Problem: Skin Integrity  Goal: Skin integrity is maintained or improved  Outcome: Not Progressing     Problem: Fall Risk  Goal: Patient will remain free from falls  Outcome: Progressing       Patient is not progressing towards the following goals:      Problem: Skin Integrity  Goal: Skin integrity is maintained or improved  Outcome: Not Progressing

## 2022-01-28 NOTE — DISCHARGE PLANNING
Care Transition Team Assessment    Chart review completed. [phone] and was on service with [facility]. Pt receives assistance with all ADL's.     Case discussed during IDT rounds. Pt not medically cleared at this time, pending final ID recs.       Information Source  Orientation Level: Oriented X4    Elopement Risk  Legal Hold: No  Ambulatory or Self Mobile in Wheelchair: No-Not an Elopement Risk  Disoriented: No  Psychiatric Symptoms: None  History of Wandering: No  Elopement this Admit: No  Vocalizing Wanting to Leave: No  Displays Behaviors, Body Language Wanting to Leave: No-Not at Risk for Elopement  Elopement Risk: Not at Risk for Elopement    Interdisciplinary Discharge Planning  Lives with - Patient's Self Care Capacity: Other (Comments)  Patient or legal guardian wants to designate a caregiver: No  Support Systems: Children  Housing / Facility: [facility]  Name of Care Facility: [facility]  Patient Prefers to be Discharged to:: [facility]    Discharge Preparedness  What is your plan after discharge?: Uncertain - pending medical team collaboration  Prior Functional Level: Needs Assist with Activities of Daily Living,Needs Assist with Medication Management    Functional Assesment  Prior Functional Level: Needs Assist with Activities of Daily Living,Needs Assist with Medication Management    Finances  Financial Barriers to Discharge: No  Prescription Coverage: Yes    Vision / Hearing Impairment  Vision Impairment : Yes  Right Eye Vision: Impaired,Wears Glasses  Left Eye Vision: Impaired,Wears Glasses  Hearing Impairment : No    Domestic Abuse  Have you ever been the victim of abuse or violence?: No  Physical Abuse or Sexual Abuse: No  Verbal Abuse or Emotional Abuse: No  Possible Abuse/Neglect Reported to:: Not Applicable    Psychological Assessment  History of Substance Abuse: None  History of Psychiatric Problems: No  Non-compliant with Treatment: No  Newly  Diagnosed Illness: Yes    Discharge Risks or Barriers  Discharge risks or barriers?: No    Anticipated Discharge Information  Discharge Disposition: D/T to SNF with Medicare cert in anticipation of skilled care (03)

## 2022-01-29 ENCOUNTER — APPOINTMENT (OUTPATIENT)
Dept: RADIOLOGY | Facility: MEDICAL CENTER | Age: 72
DRG: 463 | End: 2022-01-29
Attending: INTERNAL MEDICINE
Payer: MEDICARE

## 2022-01-29 PROCEDURE — A9270 NON-COVERED ITEM OR SERVICE: HCPCS | Performed by: HOSPITALIST

## 2022-01-29 PROCEDURE — 700111 HCHG RX REV CODE 636 W/ 250 OVERRIDE (IP): Performed by: HOSPITALIST

## 2022-01-29 PROCEDURE — 700111 HCHG RX REV CODE 636 W/ 250 OVERRIDE (IP): Performed by: INTERNAL MEDICINE

## 2022-01-29 PROCEDURE — 99233 SBSQ HOSP IP/OBS HIGH 50: CPT | Performed by: INTERNAL MEDICINE

## 2022-01-29 PROCEDURE — A9270 NON-COVERED ITEM OR SERVICE: HCPCS | Performed by: INTERNAL MEDICINE

## 2022-01-29 PROCEDURE — 700102 HCHG RX REV CODE 250 W/ 637 OVERRIDE(OP): Performed by: INTERNAL MEDICINE

## 2022-01-29 PROCEDURE — 700105 HCHG RX REV CODE 258: Performed by: HOSPITALIST

## 2022-01-29 PROCEDURE — 700111 HCHG RX REV CODE 636 W/ 250 OVERRIDE (IP): Performed by: STUDENT IN AN ORGANIZED HEALTH CARE EDUCATION/TRAINING PROGRAM

## 2022-01-29 PROCEDURE — 700102 HCHG RX REV CODE 250 W/ 637 OVERRIDE(OP): Performed by: STUDENT IN AN ORGANIZED HEALTH CARE EDUCATION/TRAINING PROGRAM

## 2022-01-29 PROCEDURE — 99232 SBSQ HOSP IP/OBS MODERATE 35: CPT | Performed by: HOSPITALIST

## 2022-01-29 PROCEDURE — 36573 INSJ PICC RS&I 5 YR+: CPT

## 2022-01-29 PROCEDURE — 770001 HCHG ROOM/CARE - MED/SURG/GYN PRIV*

## 2022-01-29 PROCEDURE — A9270 NON-COVERED ITEM OR SERVICE: HCPCS | Performed by: STUDENT IN AN ORGANIZED HEALTH CARE EDUCATION/TRAINING PROGRAM

## 2022-01-29 PROCEDURE — 99024 POSTOP FOLLOW-UP VISIT: CPT | Performed by: ORTHOPAEDIC SURGERY

## 2022-01-29 PROCEDURE — 02HV33Z INSERTION OF INFUSION DEVICE INTO SUPERIOR VENA CAVA, PERCUTANEOUS APPROACH: ICD-10-PCS | Performed by: HOSPITALIST

## 2022-01-29 PROCEDURE — 700105 HCHG RX REV CODE 258: Performed by: INTERNAL MEDICINE

## 2022-01-29 PROCEDURE — 700102 HCHG RX REV CODE 250 W/ 637 OVERRIDE(OP): Performed by: HOSPITALIST

## 2022-01-29 RX ORDER — HYDRALAZINE HYDROCHLORIDE 20 MG/ML
10 INJECTION INTRAMUSCULAR; INTRAVENOUS EVERY 6 HOURS PRN
Status: DISCONTINUED | OUTPATIENT
Start: 2022-01-29 | End: 2022-01-31 | Stop reason: HOSPADM

## 2022-01-29 RX ADMIN — Medication 10 MG: at 21:59

## 2022-01-29 RX ADMIN — FERROUS SULFATE TAB 325 MG (65 MG ELEMENTAL FE) 325 MG: 325 (65 FE) TAB at 05:05

## 2022-01-29 RX ADMIN — BACLOFEN 20 MG: 10 TABLET ORAL at 09:32

## 2022-01-29 RX ADMIN — AMLODIPINE BESYLATE 5 MG: 5 TABLET ORAL at 11:37

## 2022-01-29 RX ADMIN — BACLOFEN 20 MG: 10 TABLET ORAL at 17:28

## 2022-01-29 RX ADMIN — Medication 2000 UNITS: at 05:05

## 2022-01-29 RX ADMIN — OXYCODONE HYDROCHLORIDE AND ACETAMINOPHEN 1000 MG: 500 TABLET ORAL at 05:05

## 2022-01-29 RX ADMIN — HYDRALAZINE HYDROCHLORIDE 10 MG: 20 INJECTION INTRAMUSCULAR; INTRAVENOUS at 19:15

## 2022-01-29 RX ADMIN — MEROPENEM 500 MG: 500 INJECTION, POWDER, FOR SOLUTION INTRAVENOUS at 17:29

## 2022-01-29 RX ADMIN — BACLOFEN 20 MG: 10 TABLET ORAL at 14:17

## 2022-01-29 RX ADMIN — ACETAMINOPHEN 650 MG: 325 TABLET, FILM COATED ORAL at 22:02

## 2022-01-29 RX ADMIN — DOCUSATE SODIUM 200 MG: 100 CAPSULE, LIQUID FILLED ORAL at 09:31

## 2022-01-29 RX ADMIN — SENNOSIDES 17.2 MG: 8.6 TABLET, FILM COATED ORAL at 21:58

## 2022-01-29 RX ADMIN — FERROUS SULFATE TAB 325 MG (65 MG ELEMENTAL FE) 325 MG: 325 (65 FE) TAB at 17:29

## 2022-01-29 RX ADMIN — SODIUM CHLORIDE, POTASSIUM CHLORIDE, SODIUM LACTATE AND CALCIUM CHLORIDE: 600; 310; 30; 20 INJECTION, SOLUTION INTRAVENOUS at 05:09

## 2022-01-29 RX ADMIN — MEROPENEM 500 MG: 500 INJECTION, POWDER, FOR SOLUTION INTRAVENOUS at 14:17

## 2022-01-29 RX ADMIN — ENOXAPARIN SODIUM 40 MG: 40 INJECTION SUBCUTANEOUS at 05:05

## 2022-01-29 RX ADMIN — SENNOSIDES 17.2 MG: 8.6 TABLET, FILM COATED ORAL at 09:32

## 2022-01-29 RX ADMIN — PIPERACILLIN AND TAZOBACTAM 4.5 G: 4; .5 INJECTION, POWDER, LYOPHILIZED, FOR SOLUTION INTRAVENOUS; PARENTERAL at 05:05

## 2022-01-29 RX ADMIN — DOCUSATE SODIUM 200 MG: 100 CAPSULE, LIQUID FILLED ORAL at 21:58

## 2022-01-29 RX ADMIN — BACLOFEN 20 MG: 10 TABLET ORAL at 21:58

## 2022-01-29 RX ADMIN — DOXYCYCLINE 100 MG: 100 TABLET, FILM COATED ORAL at 05:05

## 2022-01-29 RX ADMIN — Medication 400 MG: at 05:05

## 2022-01-29 RX ADMIN — DOXYCYCLINE 100 MG: 100 TABLET, FILM COATED ORAL at 17:28

## 2022-01-29 ASSESSMENT — ENCOUNTER SYMPTOMS
WEAKNESS: 1
HEMOPTYSIS: 0
VOMITING: 0
FOCAL WEAKNESS: 1
PALPITATIONS: 0
NAUSEA: 0
MYALGIAS: 0
NERVOUS/ANXIOUS: 0
BLURRED VISION: 0
FEVER: 0
DIZZINESS: 0
FOCAL WEAKNESS: 0
SENSORY CHANGE: 1
COUGH: 0
SHORTNESS OF BREATH: 0

## 2022-01-29 NOTE — PROGRESS NOTES
Infectious Disease Progress Note    Author: Rosmery Crabtree M.D. Date & Time of service: 2022  11:22 AM    Chief Complaint:  Clinical OM    Interval History:  71 y.o. male admitted 2022.  Patient with known C5-7 paraplegia, neurogenic bladder with suprapubic catheter, history of stage IV sacral decubitus ulcer complicated by sacral osteomyelitis with abscess, completed therapy in , decubitus ulcers, recent admission in 2021 with ureteral stone and hydronephrosis, left ureteral stent placed, with plan for lithotripsy later.  Patient presented to the ER on  with nonhealing wound over the left ankle with heterotopic bone   AF tolerating abx well-he states waiting to hear if plan for VAC or skin graft-also wants to know if there is a plan for bone biopsy   AF WBC 6.1  no acute events overnight   AF Plastics eval appreciated-no need for additional surgery No new complaints   AF no CBC s/p bone biopsy yesterday Tolerating doxy well   AF bone biopsy +ESBL Proteus No new complaints Has required SNF previously    Labs Reviewed, Medications Reviewed and Wound Reviewed.    Review of Systems:  Review of Systems   Constitutional: Negative for fever.   Respiratory: Negative for cough and shortness of breath.    Cardiovascular: Negative for chest pain.   Gastrointestinal: Negative for nausea and vomiting.   Skin: Negative for itching and rash.   Neurological: Positive for sensory change and focal weakness.   All other systems reviewed and are negative.      Hemodynamics:  Temp (24hrs), Av.3 °C (97.3 °F), Min:36.1 °C (97 °F), Max:36.4 °C (97.5 °F)  Temperature: 36.4 °C (97.5 °F)  Pulse  Av.8  Min: 41  Max: 84   Blood Pressure : (!) 171/74 (Informed RN)       Physical Exam:  Physical Exam  Vitals and nursing note reviewed.   Constitutional:       General: He is not in acute distress.     Appearance: He is not ill-appearing, toxic-appearing or diaphoretic.   HENT:      Nose:  No congestion or rhinorrhea.   Eyes:      General: No scleral icterus.     Extraocular Movements: Extraocular movements intact.      Pupils: Pupils are equal, round, and reactive to light.   Cardiovascular:      Rate and Rhythm: Normal rate.   Pulmonary:      Effort: Pulmonary effort is normal. No respiratory distress.      Breath sounds: No stridor. No wheezing.   Abdominal:      General: There is no distension.      Tenderness: There is no abdominal tenderness.   Musculoskeletal:      Comments: Foot dressed   Skin:     Coloration: Skin is not jaundiced.      Findings: Bruising present.   Neurological:      Mental Status: He is alert.      Motor: Weakness present.      Comments: quadriplegic         Meds:    Current Facility-Administered Medications:   •  meropenem  •  polyethylene glycol/lytes  •  sennosides  •  docusate sodium  •  amLODIPine  •  doxycycline monohydrate  •  ferrous sulfate  •  ascorbic acid  •  vitamin D3  •  enoxaparin (LOVENOX) injection  •  melatonin  •  oxyCODONE immediate-release  •  magnesium oxide  •  baclofen  •  losartan  •  acetaminophen  •  LR  •  ondansetron  •  ondansetron  •  labetalol  •  Notify provider if pain remains uncontrolled **AND** Use the Numeric Rating Scale (NRS), Garcia-Baker Faces (WBF), or FLACC on regular floors and Critical-Care Pain Observation Tool (CPOT) on ICUs/Trauma to assess pain **AND** Pulse Ox **AND** Pharmacy Consult Request **AND** If patient difficult to arouse and/or has respiratory depression (respiratory rate of 10 or less), stop any opiates that are currently infusing and call a Rapid Response.  •  [DISCONTINUED] oxyCODONE immediate-release **OR** oxyCODONE immediate-release **OR** HYDROmorphone    Labs:  No results for input(s): WBC, RBC, HEMOGLOBIN, HEMATOCRIT, MCV, MCH, RDW, PLATELETCT, MPV, NEUTSPOLYS, LYMPHOCYTES, MONOCYTES, EOSINOPHILS, BASOPHILS, RBCMORPHOLO in the last 72 hours.  No results for input(s): SODIUM, POTASSIUM, CHLORIDE, CO2,  GLUCOSE, BUN, CPKTOTAL in the last 72 hours.  No results for input(s): ALBUMIN, TBILIRUBIN, ALKPHOSPHAT, TOTPROTEIN, ALTSGPT, ASTSGOT, CREATININE in the last 72 hours.    Imaging:  DX-ANKLE 2- VIEWS LEFT    Result Date: 1/21/2022 1/21/2022 2:58 PM HISTORY/REASON FOR EXAM:  soft tissue wound ? Exposed bone osteo etc. Left ankle wound bone exposed TECHNIQUE/EXAM DESCRIPTION AND NUMBER OF VIEWS:  2 views of the LEFT ankle. COMPARISON: None. FINDINGS: Healed fracture deformity of the distal tibia with extensive surrounding heterotopic ossification. There is heterogeneous appearance of the cortex along the anterior aspect of the tibia Healed fracture deformity of the distal fibula. No joint arthropathy.     Healed fracture deformity of the distal tibia with extensive surrounding heterotopic ossification. Healed fracture deformity of the distal fibula. There is heterogeneous appearance of the cortex along the anterior aspect of the tibia. Osteomyelitis is possible. Further evaluation with MRI can be helpful    DX-CYSTO FLUORO > 1 HOUR    Result Date: 1/4/2022 1/4/2022 6:02 PM HISTORY/REASON FOR EXAM:  Left ureteral stent placement. TECHNIQUE/EXAM DESCRIPTION AND NUMBER OF VIEWS: Cysto fluoroscopy for greater than one hour. FINDINGS: Cysto fluoroscopy was utilized for greater than one hour. Actual fluoro time was 19 seconds. Single frontal view was obtained by the urologist which demonstrates a left ureteral stent.     Cysto fluoroscopy utilized for 19 seconds by the urologist. INTERPRETING LOCATION: [address] [place] [address], [facility]      Micro:  Results     Procedure Component Value Units Date/Time    CULTURE TISSUE W/ GRM STAIN [[id number]]  (Abnormal)  (Susceptibility) Collected: 01/27/22 0816    Order Status: Completed Specimen: Tissue Updated: 01/29/22 1035     Significant Indicator POS     Source TISS     Site Left Tibia     Culture Result -     Gram Stain Result No organisms seen.     Culture Result Proteus mirabilis  ESBL  Light growth      Narrative:      Surgery Specimen    Susceptibility     Proteus mirabilis esbl (1)     Antibiotic Interpretation Microscan   Method Status    Ampicillin Resistant >16 mcg/mL ROSE Preliminary    Ceftriaxone Resistant >32 mcg/mL ROSE Preliminary    Cefazolin Resistant >16 mcg/mL ROSE Preliminary    Ciprofloxacin Resistant 2 mcg/mL ROSE Preliminary    Cefepime Resistant >16 mcg/mL ROSE Preliminary    Cefuroxime Resistant >16 mcg/mL ROSE Preliminary    Ampicillin/sulbactam Sensitive <=4/2 mcg/mL ROSE Preliminary    Tobramycin Sensitive <=2 mcg/mL ROSE Preliminary    Ertapenem Sensitive <=0.5 mcg/mL ROSE Preliminary    Gentamicin Sensitive <=2 mcg/mL ROSE Preliminary    Minocycline Resistant >8 mcg/mL ROSE Preliminary    Moxifloxacin Resistant >4 mcg/mL ROSE Preliminary    Pip/Tazobactam Sensitive <=8 mcg/mL ROSE Preliminary    Trimeth/Sulfa Resistant >2/38 mcg/mL ROSE Preliminary                   Anaerobic Culture [[id number]] Collected: 01/27/22 0816    Order Status: Completed Specimen: Tissue Updated: 01/29/22 1035     Significant Indicator NEG     Source TISS     Site Left Tibia     Culture Result Culture in progress.    Narrative:      Surgery Specimen    GRAM STAIN [[id number]] Collected: 01/27/22 0816    Order Status: Completed Specimen: Tissue Updated: 01/28/22 0636     Significant Indicator .     Source TISS     Site Left Tibia     Gram Stain Result No organisms seen.    Narrative:      Surgery Specimen          Assessment:  Active Hospital Problems    Diagnosis    • Osteomyelitis of left ankle ([facility]) [M86.9]    • Quadriplegia, C5-C7 complete ([facility]) [G82.53]    • Pressure injury of sacral region, stage 4 ([facility]) [L89.154]    • Essential hypertension [I10]    Left leg osteomyelitis- given chronic wound with exposed bone and positive wound swab, at risk for osteomyelitis   Polymicrobial wound infection-MRSA and ESBL Proteus  C5-C7 paraplegia  Chronic decubitus ulcers incl sacaral  History of sacral  osteomyelitis with abscess  S/p OR 2x3 cm wound was debrided of a large amount of   heterotopic bone and all edges were debrided back to healthy tissue  Allergic to sulfa    Plan:  Continue doxy.   Change Zosyn to meropenem as bone biopsy +ESBL Proteus-no oral options  Can use once daily ertapenem 1 gram IV daily after discharge  Anticipate 6 week course  Stop date 3/4/2022  Wound care    Dw Dr Ragsdale
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
## 2022-01-29 NOTE — CARE PLAN
The patient is Stable - Low risk of patient condition declining or worsening    Shift Goals  Clinical Goals: Q2h turns  Patient Goals: Sleep  Family Goals: KEN    Progress made toward(s) clinical / shift goals:    Problem: Knowledge Deficit - Standard  Goal: Patient and family/care givers will demonstrate understanding of plan of care, disease process/condition, diagnostic tests and medications  Outcome: Progressing     Problem: Skin Integrity  Goal: Skin integrity is maintained or improved  Outcome: Progressing     Problem: Fall Risk  Goal: Patient will remain free from falls  Outcome: Progressing     Problem: Psychosocial  Goal: Patient's level of anxiety will decrease  Outcome: Progressing  Goal: Patient's ability to verbalize feelings about condition will improve  Outcome: Progressing  Goal: Patient's ability to re-evaluate and adapt role responsibilities will improve  Outcome: Progressing  Goal: Patient and family will demonstrate ability to cope with life altering diagnosis and/or procedure  Outcome: Progressing  Goal: Spiritual and cultural needs incorporated into hospitalization  Outcome: Progressing     Problem: Communication  Goal: The ability to communicate needs accurately and effectively will improve  Outcome: Progressing     Problem: Discharge Barriers/Planning  Goal: Patient's continuum of care needs are met  Outcome: Progressing     Problem: Hemodynamics  Goal: Patient's hemodynamics, fluid balance and neurologic status will be stable or improve  Outcome: Progressing     Problem: Respiratory  Goal: Patient will achieve/maintain optimum respiratory ventilation and gas exchange  Outcome: Progressing     Problem: Fluid Volume  Goal: Fluid volume balance will be maintained  Outcome: Progressing     Problem: Dysphagia  Goal: Dysphagia will improve  Outcome: Progressing     Problem: Risk for Aspiration  Goal: Patient's risk for aspiration will be absent or decrease  Outcome: Progressing     Problem:  Nutrition  Goal: Patient's nutritional and fluid intake will be adequate or improve  Outcome: Progressing  Goal: Enteral nutrition will be maintained or improve  Outcome: Progressing  Goal: Enteral nutrition will be maintained or improve  Outcome: Progressing     Problem: Urinary Elimination  Goal: Establish and maintain regular urinary output  Outcome: Progressing     Problem: Bowel Elimination  Goal: Establish and maintain regular bowel function  Outcome: Progressing     Problem: Gastrointestinal Irritability  Goal: Nausea and vomiting will be absent or improve  Outcome: Progressing  Goal: Diarrhea will be absent or improved  Outcome: Progressing     Problem: Mobility  Goal: Patient's capacity to carry out activities will improve  Outcome: Progressing     Problem: Self Care  Goal: Patient will have the ability to perform ADLs independently or with assistance (bathe, groom, dress, toilet and feed)  Outcome: Progressing     Problem: Infection - Standard  Goal: Patient will remain free from infection  Outcome: Progressing     Problem: Wound/ / Incision Healing  Goal: Patient's wound/surgical incision will decrease in size and heals properly  Outcome: Progressing     Problem: Pain - Standard  Goal: Alleviation of pain or a reduction in pain to the patient’s comfort goal  Outcome: Progressing       Patient is not progressing towards the following goals:

## 2022-01-29 NOTE — CARE PLAN
The patient is Stable - Low risk of patient condition declining or worsening    Shift Goals  Clinical Goals: Q2h turns  Patient Goals: Sleep  Family Goals: KEN      Problem: Knowledge Deficit - Standard  Goal: Patient and family/care givers will demonstrate understanding of plan of care, disease process/condition, diagnostic tests and medications  Outcome: Progressing  Note: Pt educated on current POC, expected outcomes and goals, and new medications ordered. All questions and concerns have been answered at this time.       Problem: Skin Integrity  Goal: Skin integrity is maintained or improved  Outcome: Progressing  Note: Pt's skin assessed, JERARDO bed, moon boots, pillows for support of turning, Offloading, Q2 hour turns in place and discussed with interdisciplinary team. '     Problem: Fall Risk  Goal: Patient will remain free from falls  Outcome: Progressing  Note: Discussed patient mobility status with interdisciplinary team, fall precautions in place, pt fall prevention education done, pt educated to call for assistance when needed.        Problem: Discharge Barriers/Planning  Goal: Patient's continuum of care needs are met  Outcome: Progressing  Note: PICC line to be put in today for 6 weeks of Abx.
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
## 2022-01-29 NOTE — PROGRESS NOTES
"   Orthopaedic Progress Note    Interval changes:  Patient doing well post op  Cultures pending ID following    ROS - Patient denies any new issues.  Pain well controlled.    BP (!) 171/74   Pulse (!) 47   Temp 36.4 °C (97.5 °F) (Temporal)   Resp 20   Ht 1.778 m (5' 10\")   Wt 105 kg (231 lb 11.3 oz)   SpO2 94%       Patient seen and examined  No acute distress  Breathing non labored  RRR  LLE dressing CDI, DNVI, moves all toes, cap refill <2 sec.             Active Hospital Problems    Diagnosis    • Bradycardia [R00.1]      Priority: High   • Essential hypertension [I10]      Priority: Medium   • Osteomyelitis of left ankle ([facility]) [M86.9]    • Quadriplegia, C5-C7 complete ([facility]) [G82.53]    • Pressure injury of sacral region, stage 4 ([facility]) [L89.154]        Assessment/Plan:  Patient doing well post op  Cultures pending ID following   POD#1 S/P Bone biopsy left tibia  POD#6 S/P Irrigation and debridement of skin, subcutaneous tissue, underlying muscle and bone, left leg wound.  Wt bearing status - WBAT  Wound care/Drains - Dressings to be changed every other day by nursing  Future Procedures - none planned   Lovenox: Start 1/23, Duration-until ambulatory > 150'  Sutures/Staples out- 14 days post operatively  PT/OT-initiated  Antibiotics: adoxa 100mg PO BID, zosyn 4.5 g IV Q8  DVT Prophylaxis- TEDS/SCDs/Foot pumps  Cazares-none  Case Coordination for Discharge Planning - Disposition pending    "

## 2022-01-29 NOTE — PROCEDURES
Vascular Access Team     Date of Insertion: January 29, 2022  Arm Circumference: 36 cm  Internal length: 48 cm  External Length: 1 cm  Vein Occupancy %: 32%   Reason for PICC: Long term antibiotics  Labs: (1/25/22) WBC 6.1, , BUN 7, Cr 0.53, GFR >60, INR n/a     Consents confirmed, vessel patency confirmed with ultrasound. Risks and benefits of procedure explained to patient and education regarding central line associated bloodstream infections provided. Questions answered.      PICC placed in RUE per licensed provider order with ultrasound guidance.  4 Fr, single lumen PICC placed in basilic vein after 1 attempt(s). 2 mL of 1% lidocaine injected intradermally at the insertion site. A 21 gauge microintroducer needle was visualized entering the vein and modified Seldinger technique was used to obtain access to the vein. 48 cm catheter inserted and brisk blood return was observed from each lumen upon aspiration. Line secured at the 1 cm marker. TCS stylet removed and observed to be fully intact. Each lumen flushed using pulsatile method without resistance with 10 mL 0.9% normal saline. PICC line secured with Biopatch and Tegaderm.     PICC tip placement location is confirmed by nurse to be in the Superior Vena Cava (SVC) utilizing 3CG technology. PICC line is appropriate for use at this time. Patient tolerated procedure well, without complications.  Patient condition relayed to primary RN or ordering physician via this post procedure note in the EMR.      Ultrasound images uploaded to PACS and viewable in the EMR - yes  Ultrasound imaged printed and placed in paper chart - no     [employer] Power PICC ref # [id number], Lot # [id number], Expiration Date February 28, 2023

## 2022-01-29 NOTE — PROGRESS NOTES
Discussed with pt regarding taking amlodipine as pt's BP is high this morning and per night RN pt had wanted to hold off on administration at original due time until later. Pt states he wants to hold off on taking amlodipine for a few more hours stating his blood pressure tends to fluctuate a lot and he doesn't want it to possibly run low later in the day. Will recheck with patient later.

## 2022-01-30 PROCEDURE — 700102 HCHG RX REV CODE 250 W/ 637 OVERRIDE(OP): Performed by: STUDENT IN AN ORGANIZED HEALTH CARE EDUCATION/TRAINING PROGRAM

## 2022-01-30 PROCEDURE — 700105 HCHG RX REV CODE 258: Performed by: INTERNAL MEDICINE

## 2022-01-30 PROCEDURE — A9270 NON-COVERED ITEM OR SERVICE: HCPCS | Performed by: INTERNAL MEDICINE

## 2022-01-30 PROCEDURE — 700111 HCHG RX REV CODE 636 W/ 250 OVERRIDE (IP): Performed by: STUDENT IN AN ORGANIZED HEALTH CARE EDUCATION/TRAINING PROGRAM

## 2022-01-30 PROCEDURE — 700102 HCHG RX REV CODE 250 W/ 637 OVERRIDE(OP): Performed by: HOSPITALIST

## 2022-01-30 PROCEDURE — 99233 SBSQ HOSP IP/OBS HIGH 50: CPT | Performed by: INTERNAL MEDICINE

## 2022-01-30 PROCEDURE — 700102 HCHG RX REV CODE 250 W/ 637 OVERRIDE(OP): Performed by: INTERNAL MEDICINE

## 2022-01-30 PROCEDURE — A9270 NON-COVERED ITEM OR SERVICE: HCPCS | Performed by: STUDENT IN AN ORGANIZED HEALTH CARE EDUCATION/TRAINING PROGRAM

## 2022-01-30 PROCEDURE — A9270 NON-COVERED ITEM OR SERVICE: HCPCS | Performed by: HOSPITALIST

## 2022-01-30 PROCEDURE — 99232 SBSQ HOSP IP/OBS MODERATE 35: CPT | Performed by: HOSPITALIST

## 2022-01-30 PROCEDURE — 700111 HCHG RX REV CODE 636 W/ 250 OVERRIDE (IP): Performed by: INTERNAL MEDICINE

## 2022-01-30 PROCEDURE — 99024 POSTOP FOLLOW-UP VISIT: CPT | Performed by: ORTHOPAEDIC SURGERY

## 2022-01-30 PROCEDURE — 770001 HCHG ROOM/CARE - MED/SURG/GYN PRIV*

## 2022-01-30 RX ADMIN — AMLODIPINE BESYLATE 5 MG: 5 TABLET ORAL at 06:41

## 2022-01-30 RX ADMIN — FERROUS SULFATE TAB 325 MG (65 MG ELEMENTAL FE) 325 MG: 325 (65 FE) TAB at 06:43

## 2022-01-30 RX ADMIN — DOXYCYCLINE 100 MG: 100 TABLET, FILM COATED ORAL at 17:20

## 2022-01-30 RX ADMIN — MEROPENEM 500 MG: 500 INJECTION, POWDER, FOR SOLUTION INTRAVENOUS at 17:20

## 2022-01-30 RX ADMIN — Medication 2000 UNITS: at 06:42

## 2022-01-30 RX ADMIN — OXYCODONE HYDROCHLORIDE AND ACETAMINOPHEN 1000 MG: 500 TABLET ORAL at 06:42

## 2022-01-30 RX ADMIN — BACLOFEN 20 MG: 10 TABLET ORAL at 08:21

## 2022-01-30 RX ADMIN — BACLOFEN 20 MG: 10 TABLET ORAL at 21:45

## 2022-01-30 RX ADMIN — MEROPENEM 500 MG: 500 INJECTION, POWDER, FOR SOLUTION INTRAVENOUS at 06:41

## 2022-01-30 RX ADMIN — POLYETHYLENE GLYCOL 3350 1 PACKET: 17 POWDER, FOR SOLUTION ORAL at 14:12

## 2022-01-30 RX ADMIN — AMLODIPINE BESYLATE 7.5 MG: 5 TABLET ORAL at 08:20

## 2022-01-30 RX ADMIN — ENOXAPARIN SODIUM 40 MG: 40 INJECTION SUBCUTANEOUS at 06:42

## 2022-01-30 RX ADMIN — DOCUSATE SODIUM 200 MG: 100 CAPSULE, LIQUID FILLED ORAL at 08:21

## 2022-01-30 RX ADMIN — MEROPENEM 500 MG: 500 INJECTION, POWDER, FOR SOLUTION INTRAVENOUS at 00:13

## 2022-01-30 RX ADMIN — DOCUSATE SODIUM 200 MG: 100 CAPSULE, LIQUID FILLED ORAL at 21:45

## 2022-01-30 RX ADMIN — FERROUS SULFATE TAB 325 MG (65 MG ELEMENTAL FE) 325 MG: 325 (65 FE) TAB at 17:20

## 2022-01-30 RX ADMIN — BACLOFEN 20 MG: 10 TABLET ORAL at 12:21

## 2022-01-30 RX ADMIN — BACLOFEN 20 MG: 10 TABLET ORAL at 17:20

## 2022-01-30 RX ADMIN — DOXYCYCLINE 100 MG: 100 TABLET, FILM COATED ORAL at 06:42

## 2022-01-30 RX ADMIN — Medication 400 MG: at 06:43

## 2022-01-30 RX ADMIN — SENNOSIDES 17.2 MG: 8.6 TABLET, FILM COATED ORAL at 08:21

## 2022-01-30 RX ADMIN — SENNOSIDES 17.2 MG: 8.6 TABLET, FILM COATED ORAL at 21:44

## 2022-01-30 RX ADMIN — Medication 10 MG: at 21:45

## 2022-01-30 RX ADMIN — MEROPENEM 500 MG: 500 INJECTION, POWDER, FOR SOLUTION INTRAVENOUS at 12:21

## 2022-01-30 RX ADMIN — LOSARTAN POTASSIUM 50 MG: 50 TABLET, FILM COATED ORAL at 21:45

## 2022-01-30 ASSESSMENT — ENCOUNTER SYMPTOMS
FOCAL WEAKNESS: 1
COUGH: 0
SENSORY CHANGE: 1
ORTHOPNEA: 0
ABDOMINAL PAIN: 0
NERVOUS/ANXIOUS: 0
PALPITATIONS: 0
WEAKNESS: 1
FOCAL WEAKNESS: 0
HEMOPTYSIS: 0
FEVER: 0
NAUSEA: 0
DIZZINESS: 0
VOMITING: 0
BLURRED VISION: 0
SHORTNESS OF BREATH: 0
MYALGIAS: 0

## 2022-01-30 NOTE — PROGRESS NOTES
"   Orthopaedic Progress Note    Interval changes:  Patient doing well    Cultures positive for proteus mirabilis    ROS - Patient denies any new issues.  Pain well controlled.    BP (!) 168/74   Pulse (!) 48   Temp 36.4 °C (97.6 °F) (Temporal)   Resp 18   Ht 1.778 m (5' 10\")   Wt 105 kg (231 lb 11.3 oz)   SpO2 94%       Patient seen and examined  No acute distress  Breathing non labored  RRR  LLE dressing CDI, DNVI, moves all toes, cap refill <2 sec.         Active Hospital Problems    Diagnosis    • Bradycardia [R00.1]      Priority: High   • Essential hypertension [I10]      Priority: Medium   • Osteomyelitis of left ankle ([facility]) [M86.9]    • Quadriplegia, C5-C7 complete ([facility]) [G82.53]    • Pressure injury of sacral region, stage 4 ([facility]) [L89.154]        Assessment/Plan:  Patient doing well    Cultures positive for proteus mirabilis  POD#2 S/P Bone biopsy left tibia  POD#7 S/P Irrigation and debridement of skin, subcutaneous tissue, underlying muscle and bone, left leg wound.  Wt bearing status - WBAT  Wound care/Drains - Dressings to be changed every other day by nursing  Future Procedures - none planned   Lovenox: Start 1/23, Duration-until ambulatory > 150'  Sutures/Staples out- 14 days post operatively  PT/OT-initiated  Antibiotics: adoxa 100mg PO BID, merrem 500mg IV Q6  DVT Prophylaxis- TEDS/SCDs/Foot pumps  Cazares-none  Case Coordination for Discharge Planning - Disposition pending    "

## 2022-01-30 NOTE — PROGRESS NOTES
BP was 201 systolic however heart rate was below 55 to be able to give PRN Labetalol per parameters. Paged on call hospitalist for updates and orders, new order in.

## 2022-01-30 NOTE — PROGRESS NOTES
Infectious Disease Progress Note    Author: Rosmery Crabtree M.D. Date & Time of service: 2022  11:48 AM    Chief Complaint:  Clinical OM    Interval History:  71 y.o. male admitted 2022.  Patient with known C5-7 paraplegia, neurogenic bladder with suprapubic catheter, history of stage IV sacral decubitus ulcer complicated by sacral osteomyelitis with abscess, completed therapy in , decubitus ulcers, recent admission in 2021 with ureteral stone and hydronephrosis, left ureteral stent placed, with plan for lithotripsy later.  Patient presented to the ER on  with nonhealing wound over the left ankle with heterotopic bone   AF tolerating abx well-he states waiting to hear if plan for VAC or skin graft-also wants to know if there is a plan for bone biopsy   AF WBC 6.1  no acute events overnight   AF Plastics eval appreciated-no need for additional surgery No new complaints   AF no CBC s/p bone biopsy yesterday Tolerating doxy well   AF bone biopsy +ESBL Proteus No new complaints Has required SNF previously   AF continues to tolerate antibiotics well. No new complaints Plan of care reviewed. Bone biopsy also +MRSA    Labs Reviewed, Medications Reviewed and Wound Reviewed.    Review of Systems:  Review of Systems   Constitutional: Negative for fever.   Respiratory: Negative for cough and shortness of breath.    Cardiovascular: Negative for chest pain.   Gastrointestinal: Negative for abdominal pain, nausea and vomiting.   Skin: Negative for itching and rash.   Neurological: Positive for sensory change and focal weakness.   All other systems reviewed and are negative.      Hemodynamics:  Temp (24hrs), Av.4 °C (97.6 °F), Min:36.1 °C (96.9 °F), Max:37 °C (98.6 °F)  Temperature: 37 °C (98.6 °F)  Pulse  Av.1  Min: 41  Max: 84   Blood Pressure : (!) 174/77       Physical Exam:  Physical Exam  Vitals and nursing note reviewed.   Constitutional:       General: He is  not in acute distress.     Appearance: He is not ill-appearing, toxic-appearing or diaphoretic.   HENT:      Nose: No congestion or rhinorrhea.   Eyes:      General: No scleral icterus.     Extraocular Movements: Extraocular movements intact.      Pupils: Pupils are equal, round, and reactive to light.   Cardiovascular:      Rate and Rhythm: Normal rate.   Pulmonary:      Effort: Pulmonary effort is normal. No respiratory distress.      Breath sounds: No stridor. No wheezing.   Abdominal:      General: There is no distension.      Tenderness: There is no abdominal tenderness.   Musculoskeletal:      Comments: Foot dressed   Skin:     Coloration: Skin is not jaundiced.      Findings: Bruising present.   Neurological:      Mental Status: He is alert.      Motor: Weakness present.      Comments: quadriplegic         Meds:    Current Facility-Administered Medications:   •  amLODIPine  •  meropenem  •  hydrALAZINE  •  polyethylene glycol/lytes  •  sennosides  •  docusate sodium  •  doxycycline monohydrate  •  ferrous sulfate  •  ascorbic acid  •  vitamin D3  •  enoxaparin (LOVENOX) injection  •  melatonin  •  oxyCODONE immediate-release  •  magnesium oxide  •  baclofen  •  losartan  •  acetaminophen  •  LR  •  ondansetron  •  ondansetron  •  labetalol  •  Notify provider if pain remains uncontrolled **AND** Use the Numeric Rating Scale (NRS), Garcia-Baker Faces (WBF), or FLACC on regular floors and Critical-Care Pain Observation Tool (CPOT) on ICUs/Trauma to assess pain **AND** Pulse Ox **AND** Pharmacy Consult Request **AND** If patient difficult to arouse and/or has respiratory depression (respiratory rate of 10 or less), stop any opiates that are currently infusing and call a Rapid Response.  •  [DISCONTINUED] oxyCODONE immediate-release **OR** oxyCODONE immediate-release **OR** HYDROmorphone    Labs:  No results for input(s): WBC, RBC, HEMOGLOBIN, HEMATOCRIT, MCV, MCH, RDW, PLATELETCT, MPV, NEUTSPOLYS, LYMPHOCYTES,  MONOCYTES, EOSINOPHILS, BASOPHILS, RBCMORPHOLO in the last 72 hours.  No results for input(s): SODIUM, POTASSIUM, CHLORIDE, CO2, GLUCOSE, BUN, CPKTOTAL in the last 72 hours.  No results for input(s): ALBUMIN, TBILIRUBIN, ALKPHOSPHAT, TOTPROTEIN, ALTSGPT, ASTSGOT, CREATININE in the last 72 hours.    Imaging:  DX-ANKLE 2- VIEWS LEFT    Result Date: 1/21/2022 1/21/2022 2:58 PM HISTORY/REASON FOR EXAM:  soft tissue wound ? Exposed bone osteo etc. Left ankle wound bone exposed TECHNIQUE/EXAM DESCRIPTION AND NUMBER OF VIEWS:  2 views of the LEFT ankle. COMPARISON: None. FINDINGS: Healed fracture deformity of the distal tibia with extensive surrounding heterotopic ossification. There is heterogeneous appearance of the cortex along the anterior aspect of the tibia Healed fracture deformity of the distal fibula. No joint arthropathy.     Healed fracture deformity of the distal tibia with extensive surrounding heterotopic ossification. Healed fracture deformity of the distal fibula. There is heterogeneous appearance of the cortex along the anterior aspect of the tibia. Osteomyelitis is possible. Further evaluation with MRI can be helpful    DX-CYSTO FLUORO > 1 HOUR    Result Date: 1/4/2022 1/4/2022 6:02 PM HISTORY/REASON FOR EXAM:  Left ureteral stent placement. TECHNIQUE/EXAM DESCRIPTION AND NUMBER OF VIEWS: Cysto fluoroscopy for greater than one hour. FINDINGS: Cysto fluoroscopy was utilized for greater than one hour. Actual fluoro time was 19 seconds. Single frontal view was obtained by the urologist which demonstrates a left ureteral stent.     Cysto fluoroscopy utilized for 19 seconds by the urologist. INTERPRETING LOCATION: [address] [place] [address], [facility]      Micro:  Results     Procedure Component Value Units Date/Time    CULTURE TISSUE W/ GRM STAIN [[id number]]  (Abnormal)  (Susceptibility) Collected: 01/27/22 0816    Order Status: Completed Specimen: Tissue Updated: 01/30/22 0912     Significant Indicator POS      Source TISS     Site Left Tibia     Culture Result -     Gram Stain Result No organisms seen.     Culture Result Proteus mirabilis ESBL  Light growth        Staphylococcus aureus  Rare growth  Methicillin resistant by screening method.      Narrative:      Surgery Specimen    Susceptibility     Proteus mirabilis esbl (1)     Antibiotic Interpretation Microscan   Method Status    Ampicillin Resistant >16 mcg/mL ROSE Preliminary    Ceftriaxone Resistant >32 mcg/mL ROSE Preliminary    Cefazolin Resistant >16 mcg/mL ROSE Preliminary    Ciprofloxacin Resistant 2 mcg/mL ROSE Preliminary    Cefepime Resistant >16 mcg/mL ROSE Preliminary    Cefuroxime Resistant >16 mcg/mL ROSE Preliminary    Ertapenem Sensitive <=0.5 mcg/mL ROSE Preliminary    Ampicillin/sulbactam Sensitive <=4/2 mcg/mL ROSE Preliminary    Gentamicin Sensitive <=2 mcg/mL ROSE Preliminary    Minocycline Resistant >8 mcg/mL ROSE Preliminary    Moxifloxacin Resistant >4 mcg/mL ROSE Preliminary    Pip/Tazobactam Sensitive <=8 mcg/mL ROSE Preliminary    Trimeth/Sulfa Resistant >2/38 mcg/mL ROSE Preliminary    Tobramycin Sensitive <=2 mcg/mL ROSE Preliminary                   Anaerobic Culture [[id number]] Collected: 01/27/22 0816    Order Status: Completed Specimen: Tissue Updated: 01/30/22 0912     Significant Indicator NEG     Source TISS     Site Left Tibia     Culture Result Culture in progress.    Narrative:      Surgery Specimen    GRAM STAIN [[id number]] Collected: 01/27/22 0816    Order Status: Completed Specimen: Tissue Updated: 01/28/22 0636     Significant Indicator .     Source TISS     Site Left Tibia     Gram Stain Result No organisms seen.    Narrative:      Surgery Specimen          Assessment:  Active Hospital Problems    Diagnosis    • Osteomyelitis of left ankle ([facility]) [M86.9]    • Quadriplegia, C5-C7 complete ([facility]) [G82.53]    • Pressure injury of sacral region, stage 4 ([facility]) [L89.154]    • Essential hypertension [I10]    Left leg osteomyelitis- given chronic  wound with exposed bone and positive wound swab, at risk for osteomyelitis   Polymicrobial wound infection-MRSA and ESBL Proteus  C5-C7 paraplegia  Chronic decubitus ulcers incl sacaral  History of sacral osteomyelitis with abscess  S/p OR 2x3 cm wound was debrided of a large amount of   heterotopic bone and all edges were debrided back to healthy tissue  Allergic to sulfa    Plan:  Continue doxy for MRSA.   Continue meropenem as bone biopsy +ESBL Proteus-no oral options  Can use once daily ertapenem 1 gram IV daily after discharge  Anticipate 6 week course  Stop date 3/4/2022  Wound care  Plan for SNF    FU ID clinic first available  Will sign off-please reconsult if needed  Dw Dr Ragsdale
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
## 2022-01-30 NOTE — CARE PLAN
The patient is Stable - Low risk of patient condition declining or worsening    Shift Goals  Clinical Goals: Q2h turns  Patient Goals: Sleep  Family Goals: KEN    Progress made toward(s) clinical / shift goals:    Problem: Skin Integrity  Goal: Skin integrity is maintained or improved  Outcome: Progressing     Problem: Fall Risk  Goal: Patient will remain free from falls  Outcome: Progressing     Problem: Psychosocial  Goal: Patient's level of anxiety will decrease  Outcome: Progressing       Patient is not progressing towards the following goals:

## 2022-01-31 VITALS
TEMPERATURE: 97.1 F | SYSTOLIC BLOOD PRESSURE: 124 MMHG | RESPIRATION RATE: 17 BRPM | DIASTOLIC BLOOD PRESSURE: 72 MMHG | BODY MASS INDEX: 33.17 KG/M2 | HEART RATE: 58 BPM | WEIGHT: 231.7 LBS | OXYGEN SATURATION: 97 % | HEIGHT: 70 IN

## 2022-01-31 PROBLEM — Z93.3 PRESENCE OF COLOSTOMY (HCC): Status: ACTIVE | Noted: 2022-01-31

## 2022-01-31 PROBLEM — L89.154 PRESSURE INJURY OF SACRAL REGION, STAGE 4 (HCC): Chronic | Status: RESOLVED | Noted: 2019-07-24 | Resolved: 2022-01-31

## 2022-01-31 LAB
BACTERIA TISS AEROBE CULT: ABNORMAL
GRAM STN SPEC: ABNORMAL
SARS-COV+SARS-COV-2 AG RESP QL IA.RAPID: NOTDETECTED
SIGNIFICANT IND 70042: ABNORMAL
SITE SITE: ABNORMAL
SOURCE SOURCE: ABNORMAL
SPECIMEN SOURCE: NORMAL

## 2022-01-31 PROCEDURE — 700102 HCHG RX REV CODE 250 W/ 637 OVERRIDE(OP): Performed by: STUDENT IN AN ORGANIZED HEALTH CARE EDUCATION/TRAINING PROGRAM

## 2022-01-31 PROCEDURE — 700105 HCHG RX REV CODE 258: Performed by: INTERNAL MEDICINE

## 2022-01-31 PROCEDURE — 700102 HCHG RX REV CODE 250 W/ 637 OVERRIDE(OP): Performed by: INTERNAL MEDICINE

## 2022-01-31 PROCEDURE — 99239 HOSP IP/OBS DSCHRG MGMT >30: CPT | Performed by: HOSPITALIST

## 2022-01-31 PROCEDURE — 700111 HCHG RX REV CODE 636 W/ 250 OVERRIDE (IP): Performed by: INTERNAL MEDICINE

## 2022-01-31 PROCEDURE — A9270 NON-COVERED ITEM OR SERVICE: HCPCS | Performed by: STUDENT IN AN ORGANIZED HEALTH CARE EDUCATION/TRAINING PROGRAM

## 2022-01-31 PROCEDURE — 87426 SARSCOV CORONAVIRUS AG IA: CPT

## 2022-01-31 PROCEDURE — 700102 HCHG RX REV CODE 250 W/ 637 OVERRIDE(OP): Performed by: HOSPITALIST

## 2022-01-31 PROCEDURE — 700111 HCHG RX REV CODE 636 W/ 250 OVERRIDE (IP): Performed by: STUDENT IN AN ORGANIZED HEALTH CARE EDUCATION/TRAINING PROGRAM

## 2022-01-31 PROCEDURE — A9270 NON-COVERED ITEM OR SERVICE: HCPCS | Performed by: INTERNAL MEDICINE

## 2022-01-31 PROCEDURE — A9270 NON-COVERED ITEM OR SERVICE: HCPCS | Performed by: HOSPITALIST

## 2022-01-31 RX ORDER — LOSARTAN POTASSIUM 50 MG/1
75 TABLET ORAL DAILY
Status: DISCONTINUED | OUTPATIENT
Start: 2022-01-31 | End: 2022-01-31 | Stop reason: HOSPADM

## 2022-01-31 RX ORDER — LOSARTAN POTASSIUM 25 MG/1
75 TABLET ORAL NIGHTLY
Status: SHIPPED
Start: 2022-01-31 | End: 2022-04-01

## 2022-01-31 RX ORDER — AMLODIPINE BESYLATE 5 MG/1
10 TABLET ORAL DAILY
Status: DISCONTINUED | OUTPATIENT
Start: 2022-02-01 | End: 2022-01-31 | Stop reason: HOSPADM

## 2022-01-31 RX ORDER — DOXYCYCLINE 100 MG/1
100 TABLET ORAL EVERY 12 HOURS
Refills: 0 | Status: SHIPPED
Start: 2022-01-31 | End: 2022-03-04

## 2022-01-31 RX ADMIN — FERROUS SULFATE TAB 325 MG (65 MG ELEMENTAL FE) 325 MG: 325 (65 FE) TAB at 06:55

## 2022-01-31 RX ADMIN — Medication 2000 UNITS: at 06:55

## 2022-01-31 RX ADMIN — MEROPENEM 500 MG: 500 INJECTION, POWDER, FOR SOLUTION INTRAVENOUS at 06:55

## 2022-01-31 RX ADMIN — SENNOSIDES 17.2 MG: 8.6 TABLET, FILM COATED ORAL at 09:07

## 2022-01-31 RX ADMIN — OXYCODONE HYDROCHLORIDE AND ACETAMINOPHEN 1000 MG: 500 TABLET ORAL at 06:55

## 2022-01-31 RX ADMIN — ENOXAPARIN SODIUM 40 MG: 40 INJECTION SUBCUTANEOUS at 06:55

## 2022-01-31 RX ADMIN — MEROPENEM 500 MG: 500 INJECTION, POWDER, FOR SOLUTION INTRAVENOUS at 01:13

## 2022-01-31 RX ADMIN — BACLOFEN 20 MG: 10 TABLET ORAL at 09:07

## 2022-01-31 RX ADMIN — Medication 400 MG: at 06:55

## 2022-01-31 RX ADMIN — DOCUSATE SODIUM 200 MG: 100 CAPSULE, LIQUID FILLED ORAL at 09:07

## 2022-01-31 RX ADMIN — AMLODIPINE BESYLATE 7.5 MG: 5 TABLET ORAL at 06:55

## 2022-01-31 RX ADMIN — BACLOFEN 20 MG: 10 TABLET ORAL at 13:53

## 2022-01-31 RX ADMIN — DOXYCYCLINE 100 MG: 100 TABLET, FILM COATED ORAL at 06:55

## 2022-01-31 NOTE — CARE PLAN
The patient is Stable - Low risk of patient condition declining or worsening    Shift Goals  Clinical Goals: maintain skin integrity  Patient Goals: Sleep  Family Goals: KEN    Progress made toward(s) clinical / shift goals:    Problem: Knowledge Deficit - Standard  Goal: Patient and family/care givers will demonstrate understanding of plan of care, disease process/condition, diagnostic tests and medications  Outcome: Progressing     Problem: Skin Integrity  Goal: Skin integrity is maintained or improved  Outcome: Progressing     Problem: Fall Risk  Goal: Patient will remain free from falls  Outcome: Progressing     Problem: Psychosocial  Goal: Patient's level of anxiety will decrease  Outcome: Progressing  Goal: Patient's ability to verbalize feelings about condition will improve  Outcome: Progressing  Goal: Patient's ability to re-evaluate and adapt role responsibilities will improve  Outcome: Progressing  Goal: Patient and family will demonstrate ability to cope with life altering diagnosis and/or procedure  Outcome: Progressing  Goal: Spiritual and cultural needs incorporated into hospitalization  Outcome: Progressing     Problem: Communication  Goal: The ability to communicate needs accurately and effectively will improve  Outcome: Progressing     Problem: Discharge Barriers/Planning  Goal: Patient's continuum of care needs are met  Outcome: Progressing     Problem: Hemodynamics  Goal: Patient's hemodynamics, fluid balance and neurologic status will be stable or improve  Outcome: Progressing     Problem: Respiratory  Goal: Patient will achieve/maintain optimum respiratory ventilation and gas exchange  Outcome: Progressing     Problem: Fluid Volume  Goal: Fluid volume balance will be maintained  Outcome: Progressing     Problem: Dysphagia  Goal: Dysphagia will improve  Outcome: Progressing     Problem: Risk for Aspiration  Goal: Patient's risk for aspiration will be absent or decrease  Outcome: Progressing      Problem: Nutrition  Goal: Patient's nutritional and fluid intake will be adequate or improve  Outcome: Progressing  Goal: Enteral nutrition will be maintained or improve  Outcome: Progressing  Goal: Enteral nutrition will be maintained or improve  Outcome: Progressing     Problem: Urinary Elimination  Goal: Establish and maintain regular urinary output  Outcome: Progressing     Problem: Bowel Elimination  Goal: Establish and maintain regular bowel function  Outcome: Progressing     Problem: Gastrointestinal Irritability  Goal: Nausea and vomiting will be absent or improve  Outcome: Progressing  Goal: Diarrhea will be absent or improved  Outcome: Progressing     Problem: Mobility  Goal: Patient's capacity to carry out activities will improve  Outcome: Progressing     Problem: Self Care  Goal: Patient will have the ability to perform ADLs independently or with assistance (bathe, groom, dress, toilet and feed)  Outcome: Progressing     Problem: Infection - Standard  Goal: Patient will remain free from infection  Outcome: Progressing     Problem: Wound/ / Incision Healing  Goal: Patient's wound/surgical incision will decrease in size and heals properly  Outcome: Progressing     Problem: Pain - Standard  Goal: Alleviation of pain or a reduction in pain to the patient’s comfort goal  Outcome: Progressing       Patient is not progressing towards the following goals:

## 2022-01-31 NOTE — PROGRESS NOTES
"   Orthopaedic Progress Note    Interval changes:  Patient doing well    ID following   Cleared for DC to SNF by ortho pending medicine and ID clearance    ROS - Patient denies any new issues.  Pain well controlled.    /63   Pulse (!) 47   Temp 36.6 °C (97.8 °F) (Temporal)   Resp 18   Ht 1.778 m (5' 10\")   Wt 105 kg (231 lb 11.3 oz)   SpO2 95%       Patient seen and examined  No acute distress  Breathing non labored  RRR  LLE dressing CDI, DNVI, moves all toes, cap refill <2 sec.         Active Hospital Problems    Diagnosis    • Bradycardia [R00.1]      Priority: High   • Essential hypertension [I10]      Priority: Medium   • Osteomyelitis of left ankle ([facility]) [M86.9]    • Quadriplegia, C5-C7 complete ([facility]) [G82.53]    • Pressure injury of sacral region, stage 4 ([facility]) [L89.154]        Assessment/Plan:  Patient doing well    Cultures positive for proteus mirabilis  POD#3 S/P Bone biopsy left tibia  POD#8 S/P Irrigation and debridement of skin, subcutaneous tissue, underlying muscle and bone, left leg wound.  Wt bearing status - WBAT  Wound care/Drains - Dressings to be changed every other day by nursing  Future Procedures - none planned   Lovenox: Start 1/23, Duration-until ambulatory > 150'  Sutures/Staples out- 14 days post operatively  PT/OT-initiated  Antibiotics: adoxa 100mg PO BID, merrem 500mg IV Q6  DVT Prophylaxis- TEDS/SCDs/Foot pumps  Cazares-none  Case Coordination for Discharge Planning - Disposition pending      I have performed a physical exam and reviewed and updated ROS and Plan today (1/30). In review of yesterday's note (1/29), there are no changes except as documented above.   "

## 2022-01-31 NOTE — DISCHARGE PLANNING
DC Transport Scheduled    Received request at: 1114    Transport Company Scheduled:  [employer]  Spoke with Lisa at [facility] to schedule transport.      Scheduled Date: 01/31/22  Scheduled Time: 1400    Destination: [facility]  [address]    Notified care team of scheduled transport via Vo[employer]te.     If there are any changes needed to the DC transportation scheduled, please contact [facility] at ext. [phone] between the hours of 0592-8346 Mon-Fri. If outside those hours, contact the ED Case Manager at ext. [phone].

## 2022-01-31 NOTE — DISCHARGE PLANNING
ADDENDUM  1/31/22   1133  [facility] # [id number]  Transport arranged for P/U at 1400 via [employer].  Medical Team notified.      Care Transition Team Discharge Planning    Anticipated Discharge Disposition: DC to SNF.    Action: Patient stated he came from [facility] and wants to return their. Lsw faxed CHOICE form to [facility], patient accepted.     Lsw informed medical team that a rapid covid test is needed. Lsw will assist with setting up gurney transportation.    Barriers to Discharge: Awaiting for SNF acceptance.    Plan: Lsw will assist medical team with DC planning.
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
## 2022-01-31 NOTE — PROGRESS NOTES
Pt discharged at 1415 via via gurney with FALGUNI to [facility]. Report given to [facility], RN. All IV's pulled, PICC in place and patent. COBRA packet sent with patient. Core measures sheet completed and sign by RN. All belongings checked and taken home with patient. All questions and concerns were met at time of discharge.

## 2022-01-31 NOTE — DISCHARGE PLANNING
@1220  Agency[facility]Facility Name: [facility]  Outcome: Left message informing patient has pic line, we swabbed for rapid covid test and will call with results and transport was set for 1400.    @1035  Agency[facility]Facility Name: [facility]  Spoke To: Melody  Outcome: Has a bed today.  Does patient have a pick line and will need a rapid covid test.    Received Choice form at 1030  Agency/Facility Name: [facility]   Referral sent per Choice form @ 1033

## 2022-02-01 PROBLEM — L98.429 SACRAL ULCER (HCC): Status: ACTIVE | Noted: 2020-12-18

## 2022-02-01 LAB
BACTERIA SPEC ANAEROBE CULT: NORMAL
SIGNIFICANT IND 70042: NORMAL
SITE SITE: NORMAL
SOURCE SOURCE: NORMAL

## 2022-02-07 ENCOUNTER — NON-PROVIDER VISIT (OUTPATIENT)
Dept: CARDIOLOGY | Facility: MEDICAL CENTER | Age: 72
End: 2022-02-07
Payer: MEDICARE

## 2022-02-07 DIAGNOSIS — Z95.818 STATUS POST PLACEMENT OF IMPLANTABLE LOOP RECORDER: ICD-10-CM

## 2022-02-07 DIAGNOSIS — I48.92 PAROXYSMAL ATRIAL FLUTTER (HCC): ICD-10-CM

## 2022-02-07 PROBLEM — N30.00 ACUTE CYSTITIS WITHOUT HEMATURIA: Status: RESOLVED | Noted: 2021-10-23 | Resolved: 2022-02-07

## 2022-02-07 PROBLEM — L89.524 PRESSURE ULCER OF LEFT ANKLE, STAGE 4 (HCC): Status: ACTIVE | Noted: 2022-02-07

## 2022-02-09 PROBLEM — E66.9 OBESITY: Status: ACTIVE | Noted: 2022-02-09

## 2022-02-14 PROCEDURE — 93298 REM INTERROG DEV EVAL SCRMS: CPT | Performed by: INTERNAL MEDICINE

## 2022-02-16 PROBLEM — M19.021 OSTEOARTHRITIS OF BOTH ELBOWS: Status: ACTIVE | Noted: 2022-02-16

## 2022-02-16 PROBLEM — M19.022 OSTEOARTHRITIS OF BOTH ELBOWS: Status: ACTIVE | Noted: 2022-02-16

## 2022-03-10 ENCOUNTER — NON-PROVIDER VISIT (OUTPATIENT)
Dept: CARDIOLOGY | Facility: MEDICAL CENTER | Age: 72
End: 2022-03-10
Payer: MEDICARE

## 2022-03-10 DIAGNOSIS — Z95.818 STATUS POST PLACEMENT OF IMPLANTABLE LOOP RECORDER: ICD-10-CM

## 2022-03-10 DIAGNOSIS — I48.92 PAROXYSMAL ATRIAL FLUTTER (HCC): ICD-10-CM

## 2022-03-10 PROCEDURE — 93298 REM INTERROG DEV EVAL SCRMS: CPT | Performed by: INTERNAL MEDICINE

## 2022-04-01 ENCOUNTER — HOSPITAL ENCOUNTER (EMERGENCY)
Facility: MEDICAL CENTER | Age: 72
End: 2022-04-01
Attending: EMERGENCY MEDICINE
Payer: MEDICARE

## 2022-04-01 ENCOUNTER — APPOINTMENT (OUTPATIENT)
Dept: RADIOLOGY | Facility: MEDICAL CENTER | Age: 72
End: 2022-04-01
Attending: EMERGENCY MEDICINE
Payer: MEDICARE

## 2022-04-01 VITALS
OXYGEN SATURATION: 94 % | BODY MASS INDEX: 32.93 KG/M2 | TEMPERATURE: 97.4 F | WEIGHT: 230 LBS | SYSTOLIC BLOOD PRESSURE: 167 MMHG | HEIGHT: 70 IN | RESPIRATION RATE: 16 BRPM | HEART RATE: 42 BPM | DIASTOLIC BLOOD PRESSURE: 76 MMHG

## 2022-04-01 DIAGNOSIS — Z51.89 ENCOUNTER FOR WOUND RE-CHECK: ICD-10-CM

## 2022-04-01 DIAGNOSIS — S81.802D WOUND OF LEFT LOWER EXTREMITY, SUBSEQUENT ENCOUNTER: ICD-10-CM

## 2022-04-01 LAB
ALBUMIN SERPL BCP-MCNC: 3.9 G/DL (ref 3.2–4.9)
ALBUMIN/GLOB SERPL: 1.3 G/DL
ALP SERPL-CCNC: 80 U/L (ref 30–99)
ALT SERPL-CCNC: 12 U/L (ref 2–50)
ANION GAP SERPL CALC-SCNC: 10 MMOL/L (ref 7–16)
AST SERPL-CCNC: 16 U/L (ref 12–45)
BASOPHILS # BLD AUTO: 0.5 % (ref 0–1.8)
BASOPHILS # BLD: 0.03 K/UL (ref 0–0.12)
BILIRUB SERPL-MCNC: 0.4 MG/DL (ref 0.1–1.5)
BUN SERPL-MCNC: 12 MG/DL (ref 8–22)
CALCIUM SERPL-MCNC: 9.1 MG/DL (ref 8.5–10.5)
CHLORIDE SERPL-SCNC: 106 MMOL/L (ref 96–112)
CO2 SERPL-SCNC: 24 MMOL/L (ref 20–33)
CREAT SERPL-MCNC: 0.65 MG/DL (ref 0.5–1.4)
CRP SERPL HS-MCNC: 0.82 MG/DL (ref 0–0.75)
EOSINOPHIL # BLD AUTO: 0.16 K/UL (ref 0–0.51)
EOSINOPHIL NFR BLD: 2.6 % (ref 0–6.9)
ERYTHROCYTE [DISTWIDTH] IN BLOOD BY AUTOMATED COUNT: 59.4 FL (ref 35.9–50)
ERYTHROCYTE [SEDIMENTATION RATE] IN BLOOD BY WESTERGREN METHOD: 23 MM/HOUR (ref 0–20)
GFR SERPLBLD CREATININE-BSD FMLA CKD-EPI: 100 ML/MIN/1.73 M 2
GLOBULIN SER CALC-MCNC: 3.1 G/DL (ref 1.9–3.5)
GLUCOSE SERPL-MCNC: 100 MG/DL (ref 65–99)
HCT VFR BLD AUTO: 41.7 % (ref 42–52)
HGB BLD-MCNC: 13.4 G/DL (ref 14–18)
IMM GRANULOCYTES # BLD AUTO: 0.03 K/UL (ref 0–0.11)
IMM GRANULOCYTES NFR BLD AUTO: 0.5 % (ref 0–0.9)
LYMPHOCYTES # BLD AUTO: 1.75 K/UL (ref 1–4.8)
LYMPHOCYTES NFR BLD: 28.6 % (ref 22–41)
MCH RBC QN AUTO: 33.4 PG (ref 27–33)
MCHC RBC AUTO-ENTMCNC: 32.1 G/DL (ref 33.7–35.3)
MCV RBC AUTO: 104 FL (ref 81.4–97.8)
MONOCYTES # BLD AUTO: 0.67 K/UL (ref 0–0.85)
MONOCYTES NFR BLD AUTO: 11 % (ref 0–13.4)
NEUTROPHILS # BLD AUTO: 3.47 K/UL (ref 1.82–7.42)
NEUTROPHILS NFR BLD: 56.8 % (ref 44–72)
NRBC # BLD AUTO: 0 K/UL
NRBC BLD-RTO: 0 /100 WBC
PLATELET # BLD AUTO: 143 K/UL (ref 164–446)
PMV BLD AUTO: 9.2 FL (ref 9–12.9)
POTASSIUM SERPL-SCNC: 4.1 MMOL/L (ref 3.6–5.5)
PROT SERPL-MCNC: 7 G/DL (ref 6–8.2)
RBC # BLD AUTO: 4.01 M/UL (ref 4.7–6.1)
SODIUM SERPL-SCNC: 140 MMOL/L (ref 135–145)
WBC # BLD AUTO: 6.1 K/UL (ref 4.8–10.8)

## 2022-04-01 PROCEDURE — 85652 RBC SED RATE AUTOMATED: CPT

## 2022-04-01 PROCEDURE — 36415 COLL VENOUS BLD VENIPUNCTURE: CPT

## 2022-04-01 PROCEDURE — 85025 COMPLETE CBC W/AUTO DIFF WBC: CPT

## 2022-04-01 PROCEDURE — 99284 EMERGENCY DEPT VISIT MOD MDM: CPT

## 2022-04-01 PROCEDURE — 80053 COMPREHEN METABOLIC PANEL: CPT

## 2022-04-01 PROCEDURE — 86140 C-REACTIVE PROTEIN: CPT

## 2022-04-01 PROCEDURE — 73600 X-RAY EXAM OF ANKLE: CPT | Mod: LT

## 2022-04-01 RX ORDER — CALCIUM CARBONATE 300MG(750)
400 TABLET,CHEWABLE ORAL EVERY MORNING
COMMUNITY

## 2022-04-01 RX ORDER — NITROFURANTOIN 25; 75 MG/1; MG/1
100 CAPSULE ORAL EVERY EVENING
Status: SHIPPED | COMMUNITY
End: 2022-07-29

## 2022-04-01 RX ORDER — LOSARTAN POTASSIUM 50 MG/1
50 TABLET ORAL
Status: SHIPPED | COMMUNITY
End: 2022-04-14 | Stop reason: SDUPTHER

## 2022-04-01 ASSESSMENT — FIBROSIS 4 INDEX: FIB4 SCORE: 1.54

## 2022-04-01 NOTE — ED PROVIDER NOTES
ED Provider Note  This patient was cared for during the COVID-19 pandemic. History and physical exam may be limited/truncated by the inherent challenges of PPE and the need to decrease staff exposure to novel coronavirus. Some aspects of disease management may be different to protect staff and help slow the spread of disease. I verified that, if possible, the patient was wearing a mask and I was wearing appropriate PPE every time I encountered the patient.     Scribed for Sahara Roth M.D. by Nav Mantilla. 4/1/2022, 1:35 PM.    Primary care provider: KATE Pretty  Means of arrival: EMS  History obtained from: Patient  History limited by: None    CHIEF COMPLAINT  Chief Complaint   Patient presents with   • Wound Check       HPI  Osvaldo Pate is a 71 y.o. male who presents to the Emergency Department via EMS for evaluation of chronic left ankle wound onset prior to arrival. The patient states that during his biweekly dressing change, his home health nurse became concerned for infection because of a mild odor from the wound and being able to see the bone. The patient denies any fever, chills, or tingling/numbness. He states that he just recently finished a course of antibiotics. No alleviating or exacerbating factors noted.     REVIEW OF SYSTEMS  Pertinent positives include left ankle wound. Pertinent negatives include no fever, chills, or tingling/numbness.  All other systems reviewed and negative.    PAST MEDICAL HISTORY   has a past medical history of A-fib ([facility]), Acute cystitis without hematuria (10/23/2021), Atrial flutter ([facility]), Blood clotting disorder ([facility]), Bowel habit changes, GERD (gastroesophageal reflux disease), Hypertension, Neurogenic bladder, and Quadriplegia, C5-C7 complete ([facility]).    SURGICAL HISTORY   has a past surgical history that includes percutaneouospinning lower extremity; colostomy; colostomy takedown; colostomy (N/A, 7/27/2019); ulcer debridement (N/A, 8/21/2019);  "flap closure (2019); hernia repair; irrigation & debridement general (2020); cystoscopy,insert ureteral stent (Left, 2021); cysto/uretero/pyeloscopy, dx (Left, 2021); lasertripsy (Left, 2021); cystoscopy,insert ureteral stent (Left, 2022); cysto/uretero/pyeloscopy, dx (Left, 2022); lasertripsy (N/A, 2022); incision and drainage orthopedic (2022); incision and drainage orthopedic (Left, 2022); and bone biopsy (Left, 2022).    SOCIAL HISTORY  Social History     Tobacco Use   • Smoking status: Former Smoker     Packs/day: 1.00     Types: Cigarettes     Start date: 1967     Quit date: 1977     Years since quittin.2   • Smokeless tobacco: Never Used   Vaping Use   • Vaping Use: Never used   Substance Use Topics   • Alcohol use: Yes     Alcohol/week: 0.6 oz     Types: 1 Standard drinks or equivalent per week     Comment: nightly   • Drug use: No      Social History     Substance and Sexual Activity   Drug Use No       FAMILY HISTORY  Family History   Problem Relation Age of Onset   • Heart Disease Father        CURRENT MEDICATIONS  Home Medications    **Home medications have not yet been reviewed for this encounter**         ALLERGIES  Allergies   Allergen Reactions   • Sulfa Drugs Rash     Rash, developed this back in  after being placed on \"sulfa antibiotic for my wound\". Antibiotic was stopped and rash went away. Patient states he had a sulfa antibiotic prior to that time back when he was younger w/o a reaction.          PHYSICAL EXAM  VITAL SIGNS: BP (!) 177/84   Pulse (!) 44   Temp 35.9 °C (96.7 °F) (Temporal)   Resp 18   Ht 1.778 m (5' 10\")   Wt 104 kg (230 lb)   SpO2 94%   BMI 33.00 kg/m²     Constitutional: Alert in no apparent distress.  HENT: No signs of trauma, Bilateral external ears normal, Nose normal.   Eyes: Pupils are equal and reactive, Conjunctiva normal, Non-icteric.   Neck: Normal range of motion, No tenderness, Supple, No " stridor.   Cardiovascular: Regular rate and rhythm, no murmurs.   Thorax & Lungs: Normal breath sounds, No respiratory distress, No wheezing, No chest tenderness.   Abdomen: Bowel sounds normal, Soft, No tenderness, No masses, No peritoneal signs.  Skin: Warm, Dry, No erythema, No rash.   Musculoskeletal:  Left ankle has a deep wound with fresh wound care, there is visible bone seen, no surrounding erythema or drainage on the dressing. No major deformities noted.   Neurologic:  Awake, Alert, moving all extremities without difficulty, no focal deficits.    LABS  Labs Reviewed   CBC WITH DIFFERENTIAL - Abnormal; Notable for the following components:       Result Value    RBC 4.01 (*)     Hemoglobin 13.4 (*)     Hematocrit 41.7 (*)     .0 (*)     MCH 33.4 (*)     MCHC 32.1 (*)     RDW 59.4 (*)     Platelet Count 143 (*)     All other components within normal limits   COMP METABOLIC PANEL - Abnormal; Notable for the following components:    Glucose 100 (*)     All other components within normal limits   SED RATE - Abnormal; Notable for the following components:    Sed Rate Westergren 23 (*)     All other components within normal limits   CRP QUANTITIVE (NON-CARDIAC) - Abnormal; Notable for the following components:    Stat C-Reactive Protein 0.82 (*)     All other components within normal limits   ESTIMATED GFR     All labs reviewed by me.      RADIOLOGY  DX-ANKLE 2- VIEWS LEFT   Final Result      1.  Evidence of old trauma to the distal LEFT tibia and fibula, healed in deformity, with heterotopic ossification.   2.  Focal soft tissue thickening at the medial distal lower leg and posterior mid to distal lower leg of uncertain significance.  Soft tissue ulceration is noted with a.   3.  No gross bony destruction, however osteomyelitis is not excluded by plain film.        The radiologist's interpretation of all radiological studies have been reviewed by me.    COURSE & MEDICAL DECISION MAKING  Pertinent Labs &  Imaging studies reviewed. (See chart for details)    Review of the patient's past medical records show that the patient was seen in May 2021, he was MRSA positive and a bone biopsy was positive for ESBL proteus. The patient has had osteomyelitis in the left ankle in the past where the bone was visible.      1:35 PM - Patient seen and examined at bedside. Ordered CRP Quantitative (Non-Cardiac), Sed Rate, CMP, CBC with diff, and DX-Ankle 2-View Left to evaluate his symptoms. Dicussed utilizing imaging and diagnostic studies to rule out any possible infection through indication of elevated inflammatory factors.         Decision Making:  This is a 71 y.o. year old male who presents  for evaluation of his chronic wound he has in his left lower extremity.  He has a history of osteomyelitis in this.  X-ray shows some evidence of old trauma of the left distal tibia and fibula.  There is no bony destruction.  His labs show an improving CRP his white count is normal he has not febrile he is not tachycardic.  The wound actually looks quite good.  His inflammatory markers are low I do not see any obvious evidence of cellulitis.  He is still on antibiotics at this time as well.  I do think he requires a good continued wound care and likely wound care at a wound care clinic.  However with out any obvious significant infection I do not think it needs debridement or IV antibiotics.  He is getting wound care as home health.  I did speak with the patient's primary care clinic to arrange for follow-up on Monday.  I did speak with the hospitalist regarding potential hospitalization for wound care consultation however he said if there is no indication for IV antibiotics or debridement then he likely does not meet criteria for hospitalization.  Therefore patient will be discharged back to his group home with outpatient continued follow-up and return precautions.     The patient will return for new or worsening symptoms and is stable at  the time of discharge. Patient was given return precautions. Patient and/or family member verbalizes understanding and will comply.    DISPOSITION:  Patient will be discharged home in stable condition.    FOLLOW UP:  KATE Pretty  [address] [id number]  [phone]    Schedule an appointment as soon as possible for a visit   Monday for reevaluation    [facility], Emergency Dept  [address] [id number] [phone]    Return to the emergency department for worsening pain swelling redness fevers or other concerns.      OUTPATIENT MEDICATIONS:  New Prescriptions    No medications on file         FINAL IMPRESSION  1. Encounter for wound re-check    2. Wound of left lower extremity, subsequent encounter               This dictation has been created using voice recognition software and/or scribes. The accuracy of the dictation is limited by the abilities of the software and the expertise of the scribes. I expect there may be some errors of grammar and possibly content. I made every attempt to manually correct the errors within my dictation. However, errors related to voice recognition software and/or scribes may still exist and should be interpreted within the appropriate context.     Nav TERAN (Scribe), am scribing for, and in the presence of, Sahara Roth M.D..    Electronically signed by: Nav Mantilla (Scribe), 4/1/2022 C    Sahara TERAN M.D. personally performed the services described in this documentation, as scribed by Nav Mantilla in my presence, and it is both accurate and complete.    The note accurately reflects work and decisions made by me.  Sahara Roth M.D.  4/1/2022  5:03 PM

## 2022-04-01 NOTE — ED TRIAGE NOTES
Brought in by FALGUNI from group home for wound check left ankle. Pt has had left ankle wound for a couple months being treated twice weekly by home care.    Sent in by wound care nurse b/c seeing bone and concerned about infection.    EMS vs: /81, HR 50, sats 95% on RA with GCS of 15.

## 2022-04-02 NOTE — DISCHARGE PLANNING
Anticipated Discharge Disposition:   Group Home    Action:   Assisting with transportation to .   MATHEUS MATT for [facility] ( caregiver) [phone]  Address: [address] [place] [address].   PCN completed and Faxed to FALGUNI Acosta at [facility] confirmed an 8pm      Barriers to Discharge:   none    Plan:   Dc back to

## 2022-04-02 NOTE — ED NOTES
Discharge instructions given and explained including f/u. All questions answered and pt verbalized understanding.

## 2022-04-02 NOTE — ED NOTES
Med rec complete per Mar from [facility].   Per Home Pt monitors his own BP and hasn't had his BP meds in 2 days.  Allergies per MAR.

## 2022-04-02 NOTE — ED NOTES
Break RN:  This RN assisted primary RN with adding photo of pt's wound to medical record.  Ordered calcium alginate for wound dressing.

## 2022-04-04 PROBLEM — Z93.59 SUPRAPUBIC CATHETER (HCC): Status: ACTIVE | Noted: 2022-04-04

## 2022-04-06 ENCOUNTER — HOSPITAL ENCOUNTER (EMERGENCY)
Facility: MEDICAL CENTER | Age: 72
End: 2022-04-06
Attending: EMERGENCY MEDICINE
Payer: MEDICARE

## 2022-04-06 VITALS
WEIGHT: 230 LBS | BODY MASS INDEX: 33 KG/M2 | DIASTOLIC BLOOD PRESSURE: 58 MMHG | TEMPERATURE: 98.5 F | SYSTOLIC BLOOD PRESSURE: 119 MMHG | HEART RATE: 59 BPM | OXYGEN SATURATION: 94 % | RESPIRATION RATE: 16 BRPM

## 2022-04-06 DIAGNOSIS — T83.9XXA PROBLEM WITH FOLEY CATHETER, INITIAL ENCOUNTER (HCC): ICD-10-CM

## 2022-04-06 LAB
APPEARANCE UR: ABNORMAL
BACTERIA #/AREA URNS HPF: NEGATIVE /HPF
BILIRUB UR QL STRIP.AUTO: NEGATIVE
COLOR UR: YELLOW
EPI CELLS #/AREA URNS HPF: ABNORMAL /HPF
GLUCOSE UR STRIP.AUTO-MCNC: NEGATIVE MG/DL
HYALINE CASTS #/AREA URNS LPF: ABNORMAL /LPF
KETONES UR STRIP.AUTO-MCNC: ABNORMAL MG/DL
LEUKOCYTE ESTERASE UR QL STRIP.AUTO: ABNORMAL
MICRO URNS: ABNORMAL
NITRITE UR QL STRIP.AUTO: NEGATIVE
PH UR STRIP.AUTO: 5.5 [PH] (ref 5–8)
PROT UR QL STRIP: 100 MG/DL
RBC # URNS HPF: ABNORMAL /HPF
RBC UR QL AUTO: ABNORMAL
SP GR UR STRIP.AUTO: >=1.03
WBC #/AREA URNS HPF: ABNORMAL /HPF

## 2022-04-06 PROCEDURE — 87077 CULTURE AEROBIC IDENTIFY: CPT

## 2022-04-06 PROCEDURE — 87086 URINE CULTURE/COLONY COUNT: CPT

## 2022-04-06 PROCEDURE — 81001 URINALYSIS AUTO W/SCOPE: CPT

## 2022-04-06 PROCEDURE — 99284 EMERGENCY DEPT VISIT MOD MDM: CPT | Mod: 25

## 2022-04-06 PROCEDURE — 51705 CHANGE OF BLADDER TUBE: CPT

## 2022-04-06 ASSESSMENT — PAIN DESCRIPTION - PAIN TYPE: TYPE: ACUTE PAIN

## 2022-04-06 ASSESSMENT — FIBROSIS 4 INDEX: FIB4 SCORE: 2.29

## 2022-04-07 NOTE — ED TRIAGE NOTES
Chief Complaint   Patient presents with   • Urinary Catheter Problem     Pt MARISA MONTES DE OCA from group [facility]. Per pt, suprapubic catheter drainage has been increasing in sediment and dark color x 2 days. This catheter has been in place for 3 weeks. Pt attempted to drink a lot of water today to flush urine but is concerned catheter might be clogged. Pt is paralyzed so unable to tell if pressure or pain in bladder.     /60   Pulse 60   Temp 37.4 °C (99.4 °F) (Oral)   Resp 14   Wt 104 kg (230 lb)   SpO2 93% RA    Has this patient been vaccinated for COVID YES
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
## 2022-04-09 NOTE — ED NOTES
ED Positive Culture Follow-up/Notification Note:    Date: 4/9/22     Patient seen in the ED on 4/6/2022 for cloudy urine seen from catheter. Patient has suprapubic catheter and has paraplegia. After evaluation ED physician stated there was no infection. Patient was not discharged on any antiinfectives. Urine culture results show candida tropicalis.      1. Problem with Cazares catheter, initial encounter (HCC)       Discharge Medication List as of 4/6/2022 10:25 PM          Allergies: Sulfa drugs     Vitals:    04/06/22 2059 04/06/22 2119 04/06/22 2144 04/06/22 2229   BP: (!) 94/56 110/51  119/58   Pulse:   (!) 59 (!) 59   Resp:    16   Temp:    36.9 °C (98.5 °F)   TempSrc:    Temporal   SpO2:  95% 95% 94%   Weight:           Final cultures:   Results     Procedure Component Value Units Date/Time    URINE CULTURE(NEW) [[id number]]  (Abnormal) Collected: 04/06/22 2120    Order Status: Completed Specimen: Urine Updated: 04/09/22 0801     Significant Indicator POS     Source UR     Site -     Culture Result -      Candida tropicalis  10-50,000 cfu/mL      Narrative:      Indication for culture:->Patient WITHOUT an indwelling Cazares  catheter in place with new onset of Dysuria, Frequency,  Urgency, and/or Suprapubic pain    URINALYSIS,CULTURE IF INDICATED [[id number]]  (Abnormal) Collected: 04/06/22 2120    Order Status: Completed Specimen: Urine Updated: 04/06/22 2134     Color Yellow     Character Hazy     Specific Gravity >=1.030     Ph 5.5     Glucose Negative mg/dL      Ketones Trace mg/dL      Protein 100 mg/dL      Bilirubin Negative     Nitrite Negative     Leukocyte Esterase Moderate     Occult Blood Large     Micro Urine Req Microscopic    Narrative:      Indication for culture:->Patient WITHOUT an indwelling Cazares  catheter in place with new onset of Dysuria, Frequency,  Urgency, and/or Suprapubic pain          Plan:   Suspect this is colonization rather than infection. Called patient to discuss. Since being seen  in the ER Anjum reports that his catheter is flowing much better once replaced. His chills have resolved and he has no symptoms. Patient does have follow-up with his PCP. Instructed patient to call back if his symptoms should return.    Darrel Hoffman, AdolfoD

## 2022-04-10 ENCOUNTER — NON-PROVIDER VISIT (OUTPATIENT)
Dept: CARDIOLOGY | Facility: MEDICAL CENTER | Age: 72
End: 2022-04-10
Payer: MEDICARE

## 2022-04-10 DIAGNOSIS — I63.9 CRYPTOGENIC STROKE (HCC): ICD-10-CM

## 2022-04-10 DIAGNOSIS — Z95.818 STATUS POST PLACEMENT OF IMPLANTABLE LOOP RECORDER: ICD-10-CM

## 2022-04-10 PROCEDURE — 93298 REM INTERROG DEV EVAL SCRMS: CPT | Performed by: INTERNAL MEDICINE

## 2022-04-11 ENCOUNTER — OFFICE VISIT (OUTPATIENT)
Dept: WOUND CARE | Facility: MEDICAL CENTER | Age: 72
End: 2022-04-11
Attending: EMERGENCY MEDICINE
Payer: MEDICARE

## 2022-04-11 VITALS
TEMPERATURE: 99 F | OXYGEN SATURATION: 98 % | DIASTOLIC BLOOD PRESSURE: 67 MMHG | HEART RATE: 53 BPM | SYSTOLIC BLOOD PRESSURE: 173 MMHG | RESPIRATION RATE: 17 BRPM

## 2022-04-11 DIAGNOSIS — T14.8XXA WOUND INFECTION: ICD-10-CM

## 2022-04-11 DIAGNOSIS — L08.9 WOUND INFECTION: ICD-10-CM

## 2022-04-11 DIAGNOSIS — T14.8XXA DEEP TISSUE INJURY: ICD-10-CM

## 2022-04-11 DIAGNOSIS — S81.802A WOUND OF LEFT LOWER EXTREMITY, INITIAL ENCOUNTER: Primary | ICD-10-CM

## 2022-04-11 PROCEDURE — 99214 OFFICE O/P EST MOD 30 MIN: CPT | Performed by: NURSE PRACTITIONER

## 2022-04-11 PROCEDURE — 99213 OFFICE O/P EST LOW 20 MIN: CPT

## 2022-04-11 ASSESSMENT — ENCOUNTER SYMPTOMS
NAUSEA: 0
FEVER: 0
VOMITING: 0
COUGH: 0
BLURRED VISION: 0
DIZZINESS: 0
PALPITATIONS: 0
DOUBLE VISION: 0
HEADACHES: 0
CHILLS: 0
SHORTNESS OF BREATH: 0

## 2022-04-11 NOTE — PATIENT INSTRUCTIONS
-Keep your wound dressing clean, dry, and intact.    -Should you experience any significant changes in your wound(s), such as infection (redness, swelling, localized heat, increased pain, fever > 101 F, chills) or have any questions regarding your home care instructions, please contact the wound center at [phone]. If after hours, contact your primary care physician or go to the hospital emergency room.
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
## 2022-04-22 ENCOUNTER — HOSPITAL ENCOUNTER (INPATIENT)
Facility: MEDICAL CENTER | Age: 72
LOS: 5 days | DRG: 570 | End: 2022-04-27
Attending: EMERGENCY MEDICINE | Admitting: HOSPITALIST
Payer: MEDICARE

## 2022-04-22 ENCOUNTER — APPOINTMENT (OUTPATIENT)
Dept: RADIOLOGY | Facility: MEDICAL CENTER | Age: 72
DRG: 570 | End: 2022-04-22
Attending: EMERGENCY MEDICINE
Payer: MEDICARE

## 2022-04-22 DIAGNOSIS — Z51.89 ENCOUNTER FOR WOUND RE-CHECK: ICD-10-CM

## 2022-04-22 DIAGNOSIS — L98.429 SKIN ULCER OF SACRUM, UNSPECIFIED ULCER STAGE (HCC): ICD-10-CM

## 2022-04-22 DIAGNOSIS — L89.153 PRESSURE INJURY OF SACRAL REGION, STAGE 3 (HCC): ICD-10-CM

## 2022-04-22 DIAGNOSIS — G82.20 PARAPLEGIA (HCC): ICD-10-CM

## 2022-04-22 DIAGNOSIS — D75.89 MACROCYTOSIS: ICD-10-CM

## 2022-04-22 DIAGNOSIS — L89.524 PRESSURE ULCER OF LEFT ANKLE, STAGE 4 (HCC): ICD-10-CM

## 2022-04-22 DIAGNOSIS — G82.53 QUADRIPLEGIA, C5-C7 COMPLETE (HCC): Chronic | ICD-10-CM

## 2022-04-22 DIAGNOSIS — L97.923: ICD-10-CM

## 2022-04-22 LAB
ALBUMIN SERPL BCP-MCNC: 4.1 G/DL (ref 3.2–4.9)
ALBUMIN/GLOB SERPL: 1.3 G/DL
ALP SERPL-CCNC: 81 U/L (ref 30–99)
ALT SERPL-CCNC: 7 U/L (ref 2–50)
ANION GAP SERPL CALC-SCNC: 13 MMOL/L (ref 7–16)
AST SERPL-CCNC: 15 U/L (ref 12–45)
BASOPHILS # BLD AUTO: 0.3 % (ref 0–1.8)
BASOPHILS # BLD: 0.02 K/UL (ref 0–0.12)
BILIRUB SERPL-MCNC: 0.5 MG/DL (ref 0.1–1.5)
BLOOD CULTURE HOLD CXBCH: NORMAL
BUN SERPL-MCNC: 13 MG/DL (ref 8–22)
CALCIUM SERPL-MCNC: 9.3 MG/DL (ref 8.5–10.5)
CHLORIDE SERPL-SCNC: 102 MMOL/L (ref 96–112)
CO2 SERPL-SCNC: 22 MMOL/L (ref 20–33)
CREAT SERPL-MCNC: 0.62 MG/DL (ref 0.5–1.4)
EOSINOPHIL # BLD AUTO: 0.18 K/UL (ref 0–0.51)
EOSINOPHIL NFR BLD: 3.1 % (ref 0–6.9)
ERYTHROCYTE [DISTWIDTH] IN BLOOD BY AUTOMATED COUNT: 57.1 FL (ref 35.9–50)
GFR SERPLBLD CREATININE-BSD FMLA CKD-EPI: 102 ML/MIN/1.73 M 2
GLOBULIN SER CALC-MCNC: 3.2 G/DL (ref 1.9–3.5)
GLUCOSE SERPL-MCNC: 94 MG/DL (ref 65–99)
HCT VFR BLD AUTO: 41.6 % (ref 42–52)
HGB BLD-MCNC: 13.8 G/DL (ref 14–18)
IMM GRANULOCYTES # BLD AUTO: 0.03 K/UL (ref 0–0.11)
IMM GRANULOCYTES NFR BLD AUTO: 0.5 % (ref 0–0.9)
LYMPHOCYTES # BLD AUTO: 1.64 K/UL (ref 1–4.8)
LYMPHOCYTES NFR BLD: 27.9 % (ref 22–41)
MCH RBC QN AUTO: 33.7 PG (ref 27–33)
MCHC RBC AUTO-ENTMCNC: 33.2 G/DL (ref 33.7–35.3)
MCV RBC AUTO: 101.7 FL (ref 81.4–97.8)
MONOCYTES # BLD AUTO: 0.72 K/UL (ref 0–0.85)
MONOCYTES NFR BLD AUTO: 12.2 % (ref 0–13.4)
NEUTROPHILS # BLD AUTO: 3.29 K/UL (ref 1.82–7.42)
NEUTROPHILS NFR BLD: 56 % (ref 44–72)
NRBC # BLD AUTO: 0 K/UL
NRBC BLD-RTO: 0 /100 WBC
PLATELET # BLD AUTO: 148 K/UL (ref 164–446)
PMV BLD AUTO: 9.2 FL (ref 9–12.9)
POTASSIUM SERPL-SCNC: 3.7 MMOL/L (ref 3.6–5.5)
PROT SERPL-MCNC: 7.3 G/DL (ref 6–8.2)
RBC # BLD AUTO: 4.09 M/UL (ref 4.7–6.1)
SODIUM SERPL-SCNC: 137 MMOL/L (ref 135–145)
WBC # BLD AUTO: 5.9 K/UL (ref 4.8–10.8)

## 2022-04-22 PROCEDURE — A9270 NON-COVERED ITEM OR SERVICE: HCPCS

## 2022-04-22 PROCEDURE — 85025 COMPLETE CBC W/AUTO DIFF WBC: CPT

## 2022-04-22 PROCEDURE — 99285 EMERGENCY DEPT VISIT HI MDM: CPT

## 2022-04-22 PROCEDURE — 700102 HCHG RX REV CODE 250 W/ 637 OVERRIDE(OP)

## 2022-04-22 PROCEDURE — 36415 COLL VENOUS BLD VENIPUNCTURE: CPT

## 2022-04-22 PROCEDURE — 700102 HCHG RX REV CODE 250 W/ 637 OVERRIDE(OP): Performed by: HOSPITALIST

## 2022-04-22 PROCEDURE — A9270 NON-COVERED ITEM OR SERVICE: HCPCS | Performed by: HOSPITALIST

## 2022-04-22 PROCEDURE — 770001 HCHG ROOM/CARE - MED/SURG/GYN PRIV*

## 2022-04-22 PROCEDURE — 80053 COMPREHEN METABOLIC PANEL: CPT

## 2022-04-22 PROCEDURE — 99223 1ST HOSP IP/OBS HIGH 75: CPT | Mod: AI | Performed by: HOSPITALIST

## 2022-04-22 PROCEDURE — 93922 UPR/L XTREMITY ART 2 LEVELS: CPT

## 2022-04-22 RX ORDER — AMOXICILLIN 250 MG
2 CAPSULE ORAL 2 TIMES DAILY
Status: DISCONTINUED | OUTPATIENT
Start: 2022-04-22 | End: 2022-04-23

## 2022-04-22 RX ORDER — ONDANSETRON 2 MG/ML
4 INJECTION INTRAMUSCULAR; INTRAVENOUS EVERY 4 HOURS PRN
Status: DISCONTINUED | OUTPATIENT
Start: 2022-04-22 | End: 2022-04-27 | Stop reason: HOSPADM

## 2022-04-22 RX ORDER — POLYETHYLENE GLYCOL 3350 17 G/17G
1 POWDER, FOR SOLUTION ORAL
Status: DISCONTINUED | OUTPATIENT
Start: 2022-04-22 | End: 2022-04-23

## 2022-04-22 RX ORDER — ONDANSETRON 4 MG/1
4 TABLET, ORALLY DISINTEGRATING ORAL EVERY 4 HOURS PRN
Status: DISCONTINUED | OUTPATIENT
Start: 2022-04-22 | End: 2022-04-27 | Stop reason: HOSPADM

## 2022-04-22 RX ORDER — GINSENG 100 MG
50 CAPSULE ORAL EVERY MORNING
COMMUNITY

## 2022-04-22 RX ORDER — ACETAMINOPHEN 160 MG
2000 TABLET,DISINTEGRATING ORAL EVERY MORNING
COMMUNITY

## 2022-04-22 RX ORDER — BISACODYL 10 MG
10 SUPPOSITORY, RECTAL RECTAL
Status: DISCONTINUED | OUTPATIENT
Start: 2022-04-22 | End: 2022-04-27 | Stop reason: HOSPADM

## 2022-04-22 RX ORDER — LOSARTAN POTASSIUM 50 MG/1
50 TABLET ORAL
COMMUNITY
End: 2023-01-18 | Stop reason: SDUPTHER

## 2022-04-22 RX ORDER — BACLOFEN 20 MG/1
20 TABLET ORAL 4 TIMES DAILY
COMMUNITY
End: 2022-06-30 | Stop reason: SDUPTHER

## 2022-04-22 RX ORDER — ACETAMINOPHEN 325 MG/1
650 TABLET ORAL EVERY 6 HOURS PRN
Status: DISCONTINUED | OUTPATIENT
Start: 2022-04-22 | End: 2022-04-27 | Stop reason: HOSPADM

## 2022-04-22 RX ORDER — ASCORBIC ACID 500 MG
1000 TABLET ORAL EVERY MORNING
Status: DISCONTINUED | OUTPATIENT
Start: 2022-04-23 | End: 2022-04-27 | Stop reason: HOSPADM

## 2022-04-22 RX ORDER — BACLOFEN 10 MG/1
20 TABLET ORAL 4 TIMES DAILY
Status: DISCONTINUED | OUTPATIENT
Start: 2022-04-22 | End: 2022-04-27 | Stop reason: HOSPADM

## 2022-04-22 RX ORDER — AMLODIPINE BESYLATE 10 MG/1
10 TABLET ORAL EVERY MORNING
COMMUNITY
End: 2023-01-18 | Stop reason: SDUPTHER

## 2022-04-22 RX ORDER — CHOLECALCIFEROL (VITAMIN D3) 125 MCG
10 CAPSULE ORAL
Status: DISCONTINUED | OUTPATIENT
Start: 2022-04-22 | End: 2022-04-27 | Stop reason: HOSPADM

## 2022-04-22 RX ORDER — AMLODIPINE BESYLATE 10 MG/1
10 TABLET ORAL
Status: DISCONTINUED | OUTPATIENT
Start: 2022-04-23 | End: 2022-04-23

## 2022-04-22 RX ORDER — DOCUSATE SODIUM 100 MG/1
200 CAPSULE, LIQUID FILLED ORAL 2 TIMES DAILY
Status: DISCONTINUED | OUTPATIENT
Start: 2022-04-23 | End: 2022-04-27 | Stop reason: HOSPADM

## 2022-04-22 RX ADMIN — SENNOSIDES AND DOCUSATE SODIUM 2 TABLET: 50; 8.6 TABLET ORAL at 20:59

## 2022-04-22 RX ADMIN — Medication 10 MG: at 22:37

## 2022-04-22 RX ADMIN — BACLOFEN 20 MG: 10 TABLET ORAL at 22:37

## 2022-04-22 ASSESSMENT — LIFESTYLE VARIABLES
TOTAL SCORE: 0
HAVE YOU EVER FELT YOU SHOULD CUT DOWN ON YOUR DRINKING: NO
EVER FELT BAD OR GUILTY ABOUT YOUR DRINKING: NO
CONSUMPTION TOTAL: NEGATIVE
TOTAL SCORE: 0
ON A TYPICAL DAY WHEN YOU DRINK ALCOHOL HOW MANY DRINKS DO YOU HAVE: 1
TOTAL SCORE: 0
HOW MANY TIMES IN THE PAST YEAR HAVE YOU HAD 5 OR MORE DRINKS IN A DAY: 0
DOES PATIENT WANT TO STOP DRINKING: NO
AVERAGE NUMBER OF DAYS PER WEEK YOU HAVE A DRINK CONTAINING ALCOHOL: 7
HAVE PEOPLE ANNOYED YOU BY CRITICIZING YOUR DRINKING: NO
EVER HAD A DRINK FIRST THING IN THE MORNING TO STEADY YOUR NERVES TO GET RID OF A HANGOVER: NO
ALCOHOL_USE: YES

## 2022-04-22 ASSESSMENT — ENCOUNTER SYMPTOMS
VOMITING: 0
CHILLS: 0
COUGH: 0
FEVER: 0
SHORTNESS OF BREATH: 0

## 2022-04-22 ASSESSMENT — FIBROSIS 4 INDEX
FIB4 SCORE: 2.72
FIB4 SCORE: 2.29

## 2022-04-22 ASSESSMENT — PAIN DESCRIPTION - PAIN TYPE: TYPE: ACUTE PAIN
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
## 2022-04-22 NOTE — ED TRIAGE NOTES
"Chief Complaint   Patient presents with   • Wound Check     Pt states he has a \"relatively new\" DTI to his coccyx and was notified by his home health RN to come to the ER to get it checked out as it had \"degraded and is getting worse.\" Pt states he has extensive history of pressure injuries due to immobility.       Wheelchair to triage for above complaint.   Educated on triage process, encourage to inform staff of any changes.     /84   Pulse 62   Temp 36.9 °C (98.4 °F) (Temporal)   Resp 16   Ht 1.778 m (5' 10\")   Wt 104 kg (230 lb)   SpO2 95%   BMI 33.00 kg/m²     "

## 2022-04-23 ENCOUNTER — HOSPITAL ENCOUNTER (INPATIENT)
Dept: RADIOLOGY | Facility: MEDICAL CENTER | Age: 72
DRG: 570 | End: 2022-04-23
Attending: EMERGENCY MEDICINE
Payer: MEDICARE

## 2022-04-23 LAB — EKG IMPRESSION: NORMAL

## 2022-04-23 PROCEDURE — 770001 HCHG ROOM/CARE - MED/SURG/GYN PRIV*

## 2022-04-23 PROCEDURE — 93010 ELECTROCARDIOGRAM REPORT: CPT | Performed by: INTERNAL MEDICINE

## 2022-04-23 PROCEDURE — 700111 HCHG RX REV CODE 636 W/ 250 OVERRIDE (IP): Performed by: INTERNAL MEDICINE

## 2022-04-23 PROCEDURE — 99222 1ST HOSP IP/OBS MODERATE 55: CPT

## 2022-04-23 PROCEDURE — 73700 CT LOWER EXTREMITY W/O DYE: CPT | Mod: LT

## 2022-04-23 PROCEDURE — 93005 ELECTROCARDIOGRAM TRACING: CPT | Performed by: INTERNAL MEDICINE

## 2022-04-23 PROCEDURE — 700102 HCHG RX REV CODE 250 W/ 637 OVERRIDE(OP): Performed by: HOSPITALIST

## 2022-04-23 PROCEDURE — 99232 SBSQ HOSP IP/OBS MODERATE 35: CPT | Performed by: INTERNAL MEDICINE

## 2022-04-23 PROCEDURE — A9270 NON-COVERED ITEM OR SERVICE: HCPCS

## 2022-04-23 PROCEDURE — A9270 NON-COVERED ITEM OR SERVICE: HCPCS | Performed by: HOSPITALIST

## 2022-04-23 PROCEDURE — 700102 HCHG RX REV CODE 250 W/ 637 OVERRIDE(OP)

## 2022-04-23 PROCEDURE — 302098 PASTE RING (FLAT): Performed by: INTERNAL MEDICINE

## 2022-04-23 RX ORDER — POLYETHYLENE GLYCOL 3350 17 G/17G
1 POWDER, FOR SOLUTION ORAL
Status: DISCONTINUED | OUTPATIENT
Start: 2022-04-23 | End: 2022-04-27 | Stop reason: HOSPADM

## 2022-04-23 RX ORDER — OXYCODONE HYDROCHLORIDE 5 MG/1
5 TABLET ORAL EVERY 4 HOURS PRN
Status: DISCONTINUED | OUTPATIENT
Start: 2022-04-23 | End: 2022-04-27 | Stop reason: HOSPADM

## 2022-04-23 RX ORDER — POLYETHYLENE GLYCOL 3350 17 G/17G
1 POWDER, FOR SOLUTION ORAL DAILY
Status: DISCONTINUED | OUTPATIENT
Start: 2022-04-23 | End: 2022-04-23

## 2022-04-23 RX ORDER — SENNOSIDES A AND B 8.6 MG/1
17.2 TABLET, FILM COATED ORAL 2 TIMES DAILY
Status: DISCONTINUED | OUTPATIENT
Start: 2022-04-23 | End: 2022-04-27 | Stop reason: HOSPADM

## 2022-04-23 RX ADMIN — OXYCODONE HYDROCHLORIDE AND ACETAMINOPHEN 1000 MG: 500 TABLET ORAL at 05:22

## 2022-04-23 RX ADMIN — BACLOFEN 20 MG: 10 TABLET ORAL at 05:22

## 2022-04-23 RX ADMIN — BACLOFEN 20 MG: 10 TABLET ORAL at 21:37

## 2022-04-23 RX ADMIN — DOCUSATE SODIUM 200 MG: 100 CAPSULE, LIQUID FILLED ORAL at 05:22

## 2022-04-23 RX ADMIN — DOCUSATE SODIUM 200 MG: 100 CAPSULE, LIQUID FILLED ORAL at 17:21

## 2022-04-23 RX ADMIN — ENOXAPARIN SODIUM 40 MG: 40 INJECTION SUBCUTANEOUS at 12:52

## 2022-04-23 RX ADMIN — BACLOFEN 20 MG: 10 TABLET ORAL at 12:04

## 2022-04-23 RX ADMIN — ACETAMINOPHEN 650 MG: 325 TABLET, FILM COATED ORAL at 05:21

## 2022-04-23 RX ADMIN — Medication 10 MG: at 21:38

## 2022-04-23 RX ADMIN — SENNOSIDES AND DOCUSATE SODIUM 2 TABLET: 50; 8.6 TABLET ORAL at 05:21

## 2022-04-23 RX ADMIN — POLYETHYLENE GLYCOL 3350 1 PACKET: 17 POWDER, FOR SOLUTION ORAL at 10:22

## 2022-04-23 RX ADMIN — BACLOFEN 20 MG: 10 TABLET ORAL at 17:21

## 2022-04-23 ASSESSMENT — COGNITIVE AND FUNCTIONAL STATUS - GENERAL
HELP NEEDED FOR BATHING: TOTAL
DRESSING REGULAR UPPER BODY CLOTHING: A LITTLE
DAILY ACTIVITIY SCORE: 13
SUGGESTED CMS G CODE MODIFIER MOBILITY: CM
TURNING FROM BACK TO SIDE WHILE IN FLAT BAD: A LOT
CLIMB 3 TO 5 STEPS WITH RAILING: TOTAL
TOILETING: TOTAL
DRESSING REGULAR LOWER BODY CLOTHING: TOTAL
SUGGESTED CMS G CODE MODIFIER DAILY ACTIVITY: CL
MOVING TO AND FROM BED TO CHAIR: A LOT
STANDING UP FROM CHAIR USING ARMS: A LOT
MOBILITY SCORE: 9
WALKING IN HOSPITAL ROOM: TOTAL
PERSONAL GROOMING: A LITTLE
MOVING FROM LYING ON BACK TO SITTING ON SIDE OF FLAT BED: UNABLE

## 2022-04-23 ASSESSMENT — ENCOUNTER SYMPTOMS
SHORTNESS OF BREATH: 0
DIARRHEA: 0
FOCAL WEAKNESS: 0
WEAKNESS: 0
BACK PAIN: 0
FEVER: 0
VOMITING: 0
MEMORY LOSS: 0
SENSORY CHANGE: 0
COUGH: 0
CHILLS: 0
FLANK PAIN: 0
ABDOMINAL PAIN: 0
DEPRESSION: 0
HEADACHES: 0
PALPITATIONS: 0
NAUSEA: 0
NERVOUS/ANXIOUS: 0
SPEECH CHANGE: 0
MYALGIAS: 0
CONSTIPATION: 0
DIZZINESS: 0

## 2022-04-23 ASSESSMENT — PAIN DESCRIPTION - PAIN TYPE: TYPE: ACUTE PAIN

## 2022-04-23 ASSESSMENT — FIBROSIS 4 INDEX: FIB4 SCORE: 2.72

## 2022-04-23 NOTE — ASSESSMENT & PLAN NOTE
He was seen by Dr. Raman orthopedics 4/22 who recommends arterial studies and a CT scan.  He tentatively has him scheduled for debridement on Tues  Limb preservation following   4/23 cont wound care  Pain control   f/u CT LE  Arterial dopplers neg PAD  No signs of infection, no abx at this time

## 2022-04-23 NOTE — PROGRESS NOTES
4 Eyes Skin Assessment Completed by ZAIRA Stokes and ZAIRA Root.    Head WDL  Ears WDL  Nose WDL  Mouth WDL  Neck WDL  Breast/Chest WDL  Shoulder Blades WDL  Spine WDL  (R) Arm/Elbow/Hand WDL  (L) Arm/Elbow/Hand WDL  Abdomen WDL, colostomy URQ, Super pubic Catheter   Groin WDL  Scrotum/Coccyx/Buttocks Redness and Excoriation. Decubitus ulcers 2 cm in length.   (R) Leg WDL  (L) Leg Redness, open wound on lower medial leg with exposed bone, Pressure injury on lower posterior leg.   (R) Heel/Foot/Toe Scab on second toe   (L) Heel/Foot/Toe  Pressure injury left heel          Devices In Places Cazares      Interventions In Place Sacral Mepilex, Pillows, Q2 Turns and Low Air Loss Mattress    Possible Skin Injury Yes    Pictures Uploaded Into Epic Yes  Wound Consult Placed Yes  RN Wound Prevention Protocol Ordered Yes
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
## 2022-04-23 NOTE — ED NOTES
Med rec completed per MAR sent with patient form [facility].  Allergies reviewed with patient and per MAR.  Patient was on prescribed a 10 day course of Augmentin on 3/22/2022 and a further 10 days on 4/4/2022. Patient was prescribed a 7 day course of fluconazole on 4/11/2022. Patient takes Macrobid daily for UTI prophylaxis.
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
## 2022-04-23 NOTE — PROGRESS NOTES
Pt A&Ox4. Room air. Remained in bed today d/t paraplegia. Evaluated by wound care and LPS. Went for CT of left leg. Suprapubic in place, due to be changed in 3 days. Colostomy with formed stool, stool softeners given. Pt denies pain. On low air loss mattress. Heel float boots on. Repositioned every 2 hours.

## 2022-04-23 NOTE — ASSESSMENT & PLAN NOTE
With a history of severe wounds and osteo  His home health nurse noted that there is a new ulcer that developed about two weeks ago and has tripled in size in the last week.  Wound care has consulted  No obvious signs of infection/systemic   Low air loss bed
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
## 2022-04-23 NOTE — ASSESSMENT & PLAN NOTE
The medication reconciliation has not been completed.  Start norvasc 5 mg daily for now and restart his regimen once it has been completed.
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
## 2022-04-23 NOTE — CARE PLAN
The patient is Stable - Low risk of patient condition declining or worsening    Shift Goals  Clinical Goals: wound care  Patient Goals: wound care consult    Progress made toward(s) clinical / shift goals:      Problem: Knowledge Deficit - Standard  Goal: Patient and family/care givers will demonstrate understanding of plan of care, disease process/condition, diagnostic tests and medications  Outcome: Progressing  Pt updated this morning regarding plan for the day. All questions answered and updated throughout the shift.      Problem: Skin Integrity  Goal: Skin integrity is maintained or improved  Outcome: Progressing  Pt repositioned every 2 hours to promote skin integrity. Heel float boots in use, low air loss mattress and wound care provided.       Patient is not progressing towards the following goals:

## 2022-04-23 NOTE — PROGRESS NOTES
Assumed care of patient at 1900 from [facility]. Patient is A&O x4, states pain level is 0/10. Patient on low air loss mattress. Bed locked in lowest position with 4 rail up. Call light  in place, belongings at bedside. Hourly rounding is in place.
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
## 2022-04-23 NOTE — ASSESSMENT & PLAN NOTE
Hx of  Currently in sinus  - on xarelto, hold d/t planned surgery, resume when cleared by surgery   4/24 sinus bradycardia, asymptomatic

## 2022-04-23 NOTE — CARE PLAN
Problem: Knowledge Deficit - Standard  Goal: Patient and family/care givers will demonstrate understanding of plan of care, disease process/condition, diagnostic tests and medications  Outcome: Progressing     Problem: Skin Integrity  Goal: Skin integrity is maintained or improved  Outcome: Progressing   The patient is Stable - Low risk of patient condition declining or worsening    Shift Goals  Clinical Goals: wound care  Patient Goals: see wound care    Progress made toward(s) clinical / shift goals:  Patient is participating in skin care interventions. Patient is at comfort goal.     Patient is not progressing towards the following goals:

## 2022-04-24 LAB
ANION GAP SERPL CALC-SCNC: 12 MMOL/L (ref 7–16)
BASOPHILS # BLD AUTO: 0.6 % (ref 0–1.8)
BASOPHILS # BLD: 0.03 K/UL (ref 0–0.12)
BUN SERPL-MCNC: 14 MG/DL (ref 8–22)
CALCIUM SERPL-MCNC: 9.3 MG/DL (ref 8.5–10.5)
CHLORIDE SERPL-SCNC: 107 MMOL/L (ref 96–112)
CO2 SERPL-SCNC: 22 MMOL/L (ref 20–33)
CREAT SERPL-MCNC: 0.52 MG/DL (ref 0.5–1.4)
EOSINOPHIL # BLD AUTO: 0.19 K/UL (ref 0–0.51)
EOSINOPHIL NFR BLD: 4 % (ref 0–6.9)
ERYTHROCYTE [DISTWIDTH] IN BLOOD BY AUTOMATED COUNT: 58.4 FL (ref 35.9–50)
GFR SERPLBLD CREATININE-BSD FMLA CKD-EPI: 107 ML/MIN/1.73 M 2
GLUCOSE SERPL-MCNC: 95 MG/DL (ref 65–99)
HCT VFR BLD AUTO: 41.8 % (ref 42–52)
HGB BLD-MCNC: 13.5 G/DL (ref 14–18)
IMM GRANULOCYTES # BLD AUTO: 0.02 K/UL (ref 0–0.11)
IMM GRANULOCYTES NFR BLD AUTO: 0.4 % (ref 0–0.9)
INR PPP: 1.16 (ref 0.87–1.13)
LYMPHOCYTES # BLD AUTO: 1.7 K/UL (ref 1–4.8)
LYMPHOCYTES NFR BLD: 35.9 % (ref 22–41)
MCH RBC QN AUTO: 33.4 PG (ref 27–33)
MCHC RBC AUTO-ENTMCNC: 32.3 G/DL (ref 33.7–35.3)
MCV RBC AUTO: 103.5 FL (ref 81.4–97.8)
MONOCYTES # BLD AUTO: 0.57 K/UL (ref 0–0.85)
MONOCYTES NFR BLD AUTO: 12 % (ref 0–13.4)
NEUTROPHILS # BLD AUTO: 2.23 K/UL (ref 1.82–7.42)
NEUTROPHILS NFR BLD: 47.1 % (ref 44–72)
NRBC # BLD AUTO: 0 K/UL
NRBC BLD-RTO: 0 /100 WBC
PLATELET # BLD AUTO: 148 K/UL (ref 164–446)
PMV BLD AUTO: 9.1 FL (ref 9–12.9)
POTASSIUM SERPL-SCNC: 4.1 MMOL/L (ref 3.6–5.5)
PROTHROMBIN TIME: 14.5 SEC (ref 12–14.6)
RBC # BLD AUTO: 4.04 M/UL (ref 4.7–6.1)
SODIUM SERPL-SCNC: 141 MMOL/L (ref 135–145)
VIT B12 SERPL-MCNC: 402 PG/ML (ref 211–911)
WBC # BLD AUTO: 4.7 K/UL (ref 4.8–10.8)

## 2022-04-24 PROCEDURE — 85610 PROTHROMBIN TIME: CPT

## 2022-04-24 PROCEDURE — 770001 HCHG ROOM/CARE - MED/SURG/GYN PRIV*

## 2022-04-24 PROCEDURE — A9270 NON-COVERED ITEM OR SERVICE: HCPCS

## 2022-04-24 PROCEDURE — 80048 BASIC METABOLIC PNL TOTAL CA: CPT

## 2022-04-24 PROCEDURE — A9270 NON-COVERED ITEM OR SERVICE: HCPCS | Performed by: INTERNAL MEDICINE

## 2022-04-24 PROCEDURE — 82607 VITAMIN B-12: CPT

## 2022-04-24 PROCEDURE — 700102 HCHG RX REV CODE 250 W/ 637 OVERRIDE(OP): Performed by: INTERNAL MEDICINE

## 2022-04-24 PROCEDURE — 700102 HCHG RX REV CODE 250 W/ 637 OVERRIDE(OP)

## 2022-04-24 PROCEDURE — 700111 HCHG RX REV CODE 636 W/ 250 OVERRIDE (IP): Performed by: INTERNAL MEDICINE

## 2022-04-24 PROCEDURE — 85025 COMPLETE CBC W/AUTO DIFF WBC: CPT

## 2022-04-24 PROCEDURE — 36415 COLL VENOUS BLD VENIPUNCTURE: CPT

## 2022-04-24 PROCEDURE — 99232 SBSQ HOSP IP/OBS MODERATE 35: CPT | Performed by: INTERNAL MEDICINE

## 2022-04-24 RX ORDER — AMLODIPINE BESYLATE 10 MG/1
10 TABLET ORAL EVERY MORNING
Status: DISCONTINUED | OUTPATIENT
Start: 2022-04-24 | End: 2022-04-27 | Stop reason: HOSPADM

## 2022-04-24 RX ORDER — LOSARTAN POTASSIUM 50 MG/1
50 TABLET ORAL
Status: DISCONTINUED | OUTPATIENT
Start: 2022-04-24 | End: 2022-04-27 | Stop reason: HOSPADM

## 2022-04-24 RX ADMIN — ACETAMINOPHEN 650 MG: 325 TABLET, FILM COATED ORAL at 09:37

## 2022-04-24 RX ADMIN — OXYCODONE HYDROCHLORIDE AND ACETAMINOPHEN 1000 MG: 500 TABLET ORAL at 06:08

## 2022-04-24 RX ADMIN — BACLOFEN 20 MG: 10 TABLET ORAL at 17:44

## 2022-04-24 RX ADMIN — SENNOSIDES 17.2 MG: 8.6 TABLET, FILM COATED ORAL at 06:00

## 2022-04-24 RX ADMIN — BACLOFEN 20 MG: 10 TABLET ORAL at 12:29

## 2022-04-24 RX ADMIN — Medication 10 MG: at 21:25

## 2022-04-24 RX ADMIN — BACLOFEN 20 MG: 10 TABLET ORAL at 06:08

## 2022-04-24 RX ADMIN — SENNOSIDES 17.2 MG: 8.6 TABLET, FILM COATED ORAL at 17:44

## 2022-04-24 RX ADMIN — BACLOFEN 20 MG: 10 TABLET ORAL at 21:26

## 2022-04-24 RX ADMIN — LOSARTAN POTASSIUM 50 MG: 50 TABLET, FILM COATED ORAL at 17:45

## 2022-04-24 RX ADMIN — DOCUSATE SODIUM 200 MG: 100 CAPSULE, LIQUID FILLED ORAL at 17:43

## 2022-04-24 RX ADMIN — ENOXAPARIN SODIUM 40 MG: 40 INJECTION SUBCUTANEOUS at 06:08

## 2022-04-24 RX ADMIN — DOCUSATE SODIUM 200 MG: 100 CAPSULE, LIQUID FILLED ORAL at 06:08

## 2022-04-24 ASSESSMENT — ENCOUNTER SYMPTOMS
MEMORY LOSS: 0
WEAKNESS: 0
FOCAL WEAKNESS: 0
MYALGIAS: 0
SHORTNESS OF BREATH: 0
FLANK PAIN: 0
ABDOMINAL PAIN: 0
VOMITING: 0
DEPRESSION: 0
HEADACHES: 0
CHILLS: 0
NERVOUS/ANXIOUS: 0
FEVER: 0
CONSTIPATION: 0
NAUSEA: 0

## 2022-04-24 NOTE — CARE PLAN
The patient is Stable - Low risk of patient condition declining or worsening    Shift Goals  Clinical Goals: wound care  Patient Goals: wound care  Family Goals: na    Progress made toward(s) clinical / shift goals:      Patient is not progressing towards the following goals:
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
## 2022-04-25 PROCEDURE — 700102 HCHG RX REV CODE 250 W/ 637 OVERRIDE(OP): Performed by: INTERNAL MEDICINE

## 2022-04-25 PROCEDURE — A9270 NON-COVERED ITEM OR SERVICE: HCPCS | Performed by: INTERNAL MEDICINE

## 2022-04-25 PROCEDURE — A9270 NON-COVERED ITEM OR SERVICE: HCPCS

## 2022-04-25 PROCEDURE — 700102 HCHG RX REV CODE 250 W/ 637 OVERRIDE(OP)

## 2022-04-25 PROCEDURE — 99232 SBSQ HOSP IP/OBS MODERATE 35: CPT | Performed by: INTERNAL MEDICINE

## 2022-04-25 PROCEDURE — 700111 HCHG RX REV CODE 636 W/ 250 OVERRIDE (IP): Performed by: INTERNAL MEDICINE

## 2022-04-25 PROCEDURE — 770001 HCHG ROOM/CARE - MED/SURG/GYN PRIV*

## 2022-04-25 RX ADMIN — SENNOSIDES 17.2 MG: 8.6 TABLET, FILM COATED ORAL at 06:08

## 2022-04-25 RX ADMIN — OXYCODONE HYDROCHLORIDE AND ACETAMINOPHEN 1000 MG: 500 TABLET ORAL at 06:07

## 2022-04-25 RX ADMIN — Medication 10 MG: at 21:27

## 2022-04-25 RX ADMIN — SENNOSIDES 17.2 MG: 8.6 TABLET, FILM COATED ORAL at 17:56

## 2022-04-25 RX ADMIN — BACLOFEN 20 MG: 10 TABLET ORAL at 06:07

## 2022-04-25 RX ADMIN — BACLOFEN 20 MG: 10 TABLET ORAL at 12:45

## 2022-04-25 RX ADMIN — ENOXAPARIN SODIUM 40 MG: 40 INJECTION SUBCUTANEOUS at 06:08

## 2022-04-25 RX ADMIN — BACLOFEN 20 MG: 10 TABLET ORAL at 17:56

## 2022-04-25 RX ADMIN — BACLOFEN 20 MG: 10 TABLET ORAL at 21:27

## 2022-04-25 RX ADMIN — DOCUSATE SODIUM 200 MG: 100 CAPSULE, LIQUID FILLED ORAL at 06:07

## 2022-04-25 RX ADMIN — DOCUSATE SODIUM 200 MG: 100 CAPSULE, LIQUID FILLED ORAL at 17:56

## 2022-04-25 RX ADMIN — LOSARTAN POTASSIUM 50 MG: 50 TABLET, FILM COATED ORAL at 21:28

## 2022-04-25 ASSESSMENT — ENCOUNTER SYMPTOMS
MEMORY LOSS: 0
MYALGIAS: 0
VOMITING: 0
FOCAL WEAKNESS: 0
CONSTIPATION: 0
SHORTNESS OF BREATH: 0
NERVOUS/ANXIOUS: 0
DEPRESSION: 0
NAUSEA: 0
WEAKNESS: 0
DIZZINESS: 0
ABDOMINAL PAIN: 0
PALPITATIONS: 0
CHILLS: 0
HEADACHES: 0
FEVER: 0
FLANK PAIN: 0
COUGH: 0

## 2022-04-25 ASSESSMENT — PAIN DESCRIPTION - PAIN TYPE: TYPE: ACUTE PAIN

## 2022-04-25 NOTE — PROGRESS NOTES
Assume care of pt at 0700. Report received from NOC RN. Pt is A/O x4. Pt denies pain at this time. Pt is resting in bed. Bed in lowest and locked position, call light within reach, hourly rounding in place. Labs reviewed. Communication board updated. Will continue to implement care during rest of shift.
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
## 2022-04-25 NOTE — CARE PLAN
The patient is Stable - Low risk of patient condition declining or worsening    Shift Goals  Clinical Goals: wound care, turns  Patient Goals: rest  Family Goals: na    Progress made toward(s) clinical / shift goals:    Problem: Skin Integrity  Goal: Skin integrity is maintained or improved  Outcome: Progressing  Note: Patient turned Q2 hours.  On low air loss mattress.  Dressing changed on sacrum as ordered.  Continue to monitor.         Patient is not progressing towards the following goals:

## 2022-04-25 NOTE — CARE PLAN
The patient is Stable - Low risk of patient condition declining or worsening    Shift Goals  Clinical Goals: Q2 turns  Patient Goals: Comfort and sleep  Family Goals: N/A    Progress made toward(s) clinical / shift goals:  Progressing  Problem: Knowledge Deficit - Standard  Goal: Patient and family/care givers will demonstrate understanding of plan of care, disease process/condition, diagnostic tests and medications  Outcome: Progressing   Pt is progressing towards goal of understanding care plan. Pt educated on plan and understands reason for diagnostic tests and medications.   Problem: Skin Integrity  Goal: Skin integrity is maintained or improved  Outcome: Progressing   Pt is progressing towards goal of maintaining skin integrity. Pt educated on ways to minimize skin breakdown.

## 2022-04-25 NOTE — CARE PLAN
The patient is Stable - Low risk of patient condition declining or worsening    Shift Goals  Clinical Goals: Q2 turns  Patient Goals: comfort  Family Goals: na    Progress made toward(s) clinical / shift goals:      Patient is not progressing towards the following goals:

## 2022-04-26 ENCOUNTER — ANESTHESIA EVENT (OUTPATIENT)
Dept: SURGERY | Facility: MEDICAL CENTER | Age: 72
DRG: 570 | End: 2022-04-26
Payer: MEDICARE

## 2022-04-26 ENCOUNTER — APPOINTMENT (OUTPATIENT)
Dept: RADIOLOGY | Facility: MEDICAL CENTER | Age: 72
DRG: 570 | End: 2022-04-26
Attending: ORTHOPAEDIC SURGERY
Payer: MEDICARE

## 2022-04-26 ENCOUNTER — ANESTHESIA (OUTPATIENT)
Dept: SURGERY | Facility: MEDICAL CENTER | Age: 72
DRG: 570 | End: 2022-04-26
Payer: MEDICARE

## 2022-04-26 LAB
GRAM STN SPEC: NORMAL
GRAM STN SPEC: NORMAL
SIGNIFICANT IND 70042: NORMAL
SIGNIFICANT IND 70042: NORMAL
SITE SITE: NORMAL
SITE SITE: NORMAL
SOURCE SOURCE: NORMAL
SOURCE SOURCE: NORMAL

## 2022-04-26 PROCEDURE — 160009 HCHG ANES TIME/MIN: Performed by: ORTHOPAEDIC SURGERY

## 2022-04-26 PROCEDURE — 01480 ANES OPEN PX LOWER L/A/F NOS: CPT | Performed by: ANESTHESIOLOGY

## 2022-04-26 PROCEDURE — 87205 SMEAR GRAM STAIN: CPT | Mod: 91

## 2022-04-26 PROCEDURE — 160002 HCHG RECOVERY MINUTES (STAT): Performed by: ORTHOPAEDIC SURGERY

## 2022-04-26 PROCEDURE — 99100 ANES PT EXTEME AGE<1 YR&>70: CPT | Performed by: ANESTHESIOLOGY

## 2022-04-26 PROCEDURE — 700111 HCHG RX REV CODE 636 W/ 250 OVERRIDE (IP): Performed by: ANESTHESIOLOGY

## 2022-04-26 PROCEDURE — 15271 SKIN SUB GRAFT TRNK/ARM/LEG: CPT | Mod: ASROC,58 | Performed by: NURSE PRACTITIONER

## 2022-04-26 PROCEDURE — 27640 PARTIAL REMOVAL OF TIBIA: CPT | Mod: 58 | Performed by: ORTHOPAEDIC SURGERY

## 2022-04-26 PROCEDURE — 700105 HCHG RX REV CODE 258: Performed by: ANESTHESIOLOGY

## 2022-04-26 PROCEDURE — 87015 SPECIMEN INFECT AGNT CONCNTJ: CPT | Mod: 91

## 2022-04-26 PROCEDURE — 87077 CULTURE AEROBIC IDENTIFY: CPT

## 2022-04-26 PROCEDURE — 700102 HCHG RX REV CODE 250 W/ 637 OVERRIDE(OP)

## 2022-04-26 PROCEDURE — 160041 HCHG SURGERY MINUTES - EA ADDL 1 MIN LEVEL 4: Performed by: ORTHOPAEDIC SURGERY

## 2022-04-26 PROCEDURE — 13160 SEC CLSR SURG WND/DEHSN XTN: CPT | Mod: 58 | Performed by: ORTHOPAEDIC SURGERY

## 2022-04-26 PROCEDURE — 99232 SBSQ HOSP IP/OBS MODERATE 35: CPT | Performed by: STUDENT IN AN ORGANIZED HEALTH CARE EDUCATION/TRAINING PROGRAM

## 2022-04-26 PROCEDURE — 0JBP0ZZ EXCISION OF LEFT LOWER LEG SUBCUTANEOUS TISSUE AND FASCIA, OPEN APPROACH: ICD-10-PCS | Performed by: ORTHOPAEDIC SURGERY

## 2022-04-26 PROCEDURE — 87070 CULTURE OTHR SPECIMN AEROBIC: CPT

## 2022-04-26 PROCEDURE — 501330 HCHG SET, CYSTO IRRIG TUBING: Performed by: ORTHOPAEDIC SURGERY

## 2022-04-26 PROCEDURE — A9270 NON-COVERED ITEM OR SERVICE: HCPCS | Performed by: INTERNAL MEDICINE

## 2022-04-26 PROCEDURE — A6223 GAUZE >16<=48 NO W/SAL W/O B: HCPCS | Performed by: ORTHOPAEDIC SURGERY

## 2022-04-26 PROCEDURE — 27640 PARTIAL REMOVAL OF TIBIA: CPT | Mod: ASROC,58 | Performed by: NURSE PRACTITIONER

## 2022-04-26 PROCEDURE — 160029 HCHG SURGERY MINUTES - 1ST 30 MINS LEVEL 4: Performed by: ORTHOPAEDIC SURGERY

## 2022-04-26 PROCEDURE — 87075 CULTR BACTERIA EXCEPT BLOOD: CPT

## 2022-04-26 PROCEDURE — 0JUP0KZ SUPPLEMENT OF LEFT LOWER LEG SUBCUTANEOUS TISSUE AND FASCIA WITH NONAUTOLOGOUS TISSUE SUBSTITUTE, OPEN APPROACH: ICD-10-PCS | Performed by: ORTHOPAEDIC SURGERY

## 2022-04-26 PROCEDURE — A9270 NON-COVERED ITEM OR SERVICE: HCPCS

## 2022-04-26 PROCEDURE — 0QBH0ZZ EXCISION OF LEFT TIBIA, OPEN APPROACH: ICD-10-PCS | Performed by: ORTHOPAEDIC SURGERY

## 2022-04-26 PROCEDURE — 28005 TREAT FOOT BONE LESION: CPT | Mod: 58,LT | Performed by: ORTHOPAEDIC SURGERY

## 2022-04-26 PROCEDURE — 770001 HCHG ROOM/CARE - MED/SURG/GYN PRIV*

## 2022-04-26 PROCEDURE — 13160 SEC CLSR SURG WND/DEHSN XTN: CPT | Mod: ASROC,58 | Performed by: NURSE PRACTITIONER

## 2022-04-26 PROCEDURE — 15271 SKIN SUB GRAFT TRNK/ARM/LEG: CPT | Mod: 58 | Performed by: ORTHOPAEDIC SURGERY

## 2022-04-26 PROCEDURE — 501838 HCHG SUTURE GENERAL: Performed by: ORTHOPAEDIC SURGERY

## 2022-04-26 PROCEDURE — 700102 HCHG RX REV CODE 250 W/ 637 OVERRIDE(OP): Performed by: INTERNAL MEDICINE

## 2022-04-26 PROCEDURE — 160035 HCHG PACU - 1ST 60 MINS PHASE I: Performed by: ORTHOPAEDIC SURGERY

## 2022-04-26 PROCEDURE — 160048 HCHG OR STATISTICAL LEVEL 1-5: Performed by: ORTHOPAEDIC SURGERY

## 2022-04-26 PROCEDURE — 700101 HCHG RX REV CODE 250: Performed by: ANESTHESIOLOGY

## 2022-04-26 PROCEDURE — 87186 SC STD MICRODIL/AGAR DIL: CPT

## 2022-04-26 PROCEDURE — 28005 TREAT FOOT BONE LESION: CPT | Mod: ASROC,58,LT | Performed by: NURSE PRACTITIONER

## 2022-04-26 DEVICE — GRAFT AMNIOFIX HUMAN TISSUE L4 X 4IN: Type: IMPLANTABLE DEVICE | Site: LEG | Status: FUNCTIONAL

## 2022-04-26 RX ORDER — SODIUM CHLORIDE, SODIUM LACTATE, POTASSIUM CHLORIDE, CALCIUM CHLORIDE 600; 310; 30; 20 MG/100ML; MG/100ML; MG/100ML; MG/100ML
INJECTION, SOLUTION INTRAVENOUS
Status: DISCONTINUED | OUTPATIENT
Start: 2022-04-26 | End: 2022-04-26 | Stop reason: SURG

## 2022-04-26 RX ORDER — HYDRALAZINE HYDROCHLORIDE 20 MG/ML
5 INJECTION INTRAMUSCULAR; INTRAVENOUS
Status: DISCONTINUED | OUTPATIENT
Start: 2022-04-26 | End: 2022-04-26 | Stop reason: HOSPADM

## 2022-04-26 RX ORDER — SODIUM CHLORIDE, SODIUM LACTATE, POTASSIUM CHLORIDE, CALCIUM CHLORIDE 600; 310; 30; 20 MG/100ML; MG/100ML; MG/100ML; MG/100ML
INJECTION, SOLUTION INTRAVENOUS CONTINUOUS
Status: DISCONTINUED | OUTPATIENT
Start: 2022-04-26 | End: 2022-04-26 | Stop reason: HOSPADM

## 2022-04-26 RX ORDER — DEXAMETHASONE SODIUM PHOSPHATE 4 MG/ML
INJECTION, SOLUTION INTRA-ARTICULAR; INTRALESIONAL; INTRAMUSCULAR; INTRAVENOUS; SOFT TISSUE PRN
Status: DISCONTINUED | OUTPATIENT
Start: 2022-04-26 | End: 2022-04-26 | Stop reason: SURG

## 2022-04-26 RX ORDER — HYDRALAZINE HYDROCHLORIDE 20 MG/ML
INJECTION INTRAMUSCULAR; INTRAVENOUS PRN
Status: DISCONTINUED | OUTPATIENT
Start: 2022-04-26 | End: 2022-04-26 | Stop reason: SURG

## 2022-04-26 RX ORDER — LIDOCAINE HYDROCHLORIDE 20 MG/ML
INJECTION, SOLUTION EPIDURAL; INFILTRATION; INTRACAUDAL; PERINEURAL PRN
Status: DISCONTINUED | OUTPATIENT
Start: 2022-04-26 | End: 2022-04-26 | Stop reason: SURG

## 2022-04-26 RX ORDER — DIPHENHYDRAMINE HYDROCHLORIDE 50 MG/ML
12.5 INJECTION INTRAMUSCULAR; INTRAVENOUS
Status: DISCONTINUED | OUTPATIENT
Start: 2022-04-26 | End: 2022-04-26 | Stop reason: HOSPADM

## 2022-04-26 RX ORDER — HALOPERIDOL 5 MG/ML
1 INJECTION INTRAMUSCULAR
Status: DISCONTINUED | OUTPATIENT
Start: 2022-04-26 | End: 2022-04-26 | Stop reason: HOSPADM

## 2022-04-26 RX ORDER — GLYCOPYRROLATE 0.2 MG/ML
INJECTION INTRAMUSCULAR; INTRAVENOUS PRN
Status: DISCONTINUED | OUTPATIENT
Start: 2022-04-26 | End: 2022-04-26 | Stop reason: SURG

## 2022-04-26 RX ORDER — ONDANSETRON 2 MG/ML
4 INJECTION INTRAMUSCULAR; INTRAVENOUS
Status: DISCONTINUED | OUTPATIENT
Start: 2022-04-26 | End: 2022-04-26 | Stop reason: HOSPADM

## 2022-04-26 RX ORDER — CEFAZOLIN SODIUM 1 G/3ML
INJECTION, POWDER, FOR SOLUTION INTRAMUSCULAR; INTRAVENOUS PRN
Status: DISCONTINUED | OUTPATIENT
Start: 2022-04-26 | End: 2022-04-26 | Stop reason: SURG

## 2022-04-26 RX ORDER — ONDANSETRON 2 MG/ML
INJECTION INTRAMUSCULAR; INTRAVENOUS PRN
Status: DISCONTINUED | OUTPATIENT
Start: 2022-04-26 | End: 2022-04-26 | Stop reason: SURG

## 2022-04-26 RX ADMIN — OXYCODONE HYDROCHLORIDE AND ACETAMINOPHEN 1000 MG: 500 TABLET ORAL at 05:39

## 2022-04-26 RX ADMIN — BACLOFEN 20 MG: 10 TABLET ORAL at 12:50

## 2022-04-26 RX ADMIN — BACLOFEN 20 MG: 10 TABLET ORAL at 20:09

## 2022-04-26 RX ADMIN — LOSARTAN POTASSIUM 50 MG: 50 TABLET, FILM COATED ORAL at 21:00

## 2022-04-26 RX ADMIN — PROPOFOL 150 MG: 10 INJECTION, EMULSION INTRAVENOUS at 14:24

## 2022-04-26 RX ADMIN — DOCUSATE SODIUM 200 MG: 100 CAPSULE, LIQUID FILLED ORAL at 17:54

## 2022-04-26 RX ADMIN — BACLOFEN 20 MG: 10 TABLET ORAL at 17:54

## 2022-04-26 RX ADMIN — SENNOSIDES 17.2 MG: 8.6 TABLET, FILM COATED ORAL at 05:39

## 2022-04-26 RX ADMIN — SODIUM CHLORIDE, POTASSIUM CHLORIDE, SODIUM LACTATE AND CALCIUM CHLORIDE: 600; 310; 30; 20 INJECTION, SOLUTION INTRAVENOUS at 14:17

## 2022-04-26 RX ADMIN — SENNOSIDES 17.2 MG: 8.6 TABLET, FILM COATED ORAL at 17:54

## 2022-04-26 RX ADMIN — HYDRALAZINE HYDROCHLORIDE 10 MG: 20 INJECTION INTRAMUSCULAR; INTRAVENOUS at 14:39

## 2022-04-26 RX ADMIN — Medication 10 MG: at 21:40

## 2022-04-26 RX ADMIN — CEFAZOLIN 2 G: 330 INJECTION, POWDER, FOR SOLUTION INTRAMUSCULAR; INTRAVENOUS at 14:25

## 2022-04-26 RX ADMIN — ONDANSETRON 4 MG: 2 INJECTION INTRAMUSCULAR; INTRAVENOUS at 14:56

## 2022-04-26 RX ADMIN — LIDOCAINE HYDROCHLORIDE 60 MG: 20 INJECTION, SOLUTION EPIDURAL; INFILTRATION; INTRACAUDAL at 14:24

## 2022-04-26 RX ADMIN — DEXAMETHASONE SODIUM PHOSPHATE 4 MG: 4 INJECTION, SOLUTION INTRA-ARTICULAR; INTRALESIONAL; INTRAMUSCULAR; INTRAVENOUS; SOFT TISSUE at 14:56

## 2022-04-26 RX ADMIN — GLYCOPYRROLATE 0.2 MG: 0.2 INJECTION INTRAMUSCULAR; INTRAVENOUS at 14:22

## 2022-04-26 RX ADMIN — BACLOFEN 20 MG: 10 TABLET ORAL at 05:40

## 2022-04-26 RX ADMIN — DOCUSATE SODIUM 200 MG: 100 CAPSULE, LIQUID FILLED ORAL at 05:39

## 2022-04-26 ASSESSMENT — ENCOUNTER SYMPTOMS
HEADACHES: 0
DEPRESSION: 0
FLANK PAIN: 0
NERVOUS/ANXIOUS: 0
FEVER: 0
WEAKNESS: 0
ABDOMINAL PAIN: 0
VOMITING: 0
COUGH: 0
CHILLS: 0
NAUSEA: 0
MEMORY LOSS: 0
PALPITATIONS: 0
FOCAL WEAKNESS: 0
CONSTIPATION: 0
MYALGIAS: 0
DIZZINESS: 0
SHORTNESS OF BREATH: 0

## 2022-04-26 ASSESSMENT — PAIN SCALES - GENERAL: PAIN_LEVEL: 0

## 2022-04-26 ASSESSMENT — PAIN DESCRIPTION - PAIN TYPE: TYPE: ACUTE PAIN

## 2022-04-26 NOTE — PROGRESS NOTES
Pt refused coccyx dressing change for PM shift. Pt kept NPO for I&D today. Blood thinners held. Meds given with only small sips of water. Pt denies pain at this time and has no complaints. Bed left in lowest position, call light and bedside table within reach. Bed alarm on.

## 2022-04-26 NOTE — FACE TO FACE
Face to Face Supporting Documentation - Home Health    The encounter with this patient was in whole or in part the primary reason for home health admission.    Date of encounter:   Patient:                    MRN:                       YOB: 2022  Osvaldo Pate  [id number]  1950     Home health to see patient for:  Skilled Nursing care for assessment, interventions & education and Wound Care    Skilled need for:  Exacerbation of Chronic Disease State paraplegia  and Surgical Aftercare wound debridement    Skilled nursing interventions to include:  Wound Care    Homebound status evidenced by:  Need the aid of supportive devices such as crutches, canes, wheelchairs or walkers, Require the use of special transportation or Needs the assistance of another person in order to leave the home. Leaving home requires a considerable and taxing effort. There is a normal inability to leave the home.    Community Physician to provide follow up care: WESTON Pretty.     Optional Interventions? No      I certify the face to face encounter for this home health care referral meets the CMS requirements and the encounter/clinical assessment with the patient was, in whole, or in part, for the medical condition(s) listed above, which is the primary reason for home health care. Based on my clinical findings: the service(s) are medically necessary, support the need for home health care, and the homebound criteria are met.  I certify that this patient has had a face to face encounter by myself.  Agatha Gonzales M.D. - NPI: [id number]

## 2022-04-26 NOTE — PROGRESS NOTES
POST-OP NOTE FOR LIMB PRESERVATION SERVICE    SURGERY DATE: 4/26/2022    PROCEDURE: Procedure(s):  IRRIGATION AND DEBRIDEMENT, WOUND - LEG - Wound Class: Dirty or Infected  CLOSURE, WOUND - Wound Class: Dirty or Infected  OSTECTOMY - Wound Class: Dirty or Infected    WEIGHT BEARING STATUS: Weight bearing as tolerated    PT CONSULT: Yes    ANTIBIOTICS: Per ID recommendation    PLAN TO RETURN TO O.R.: No    WOUND CARE PLAN: Incisional dressing - Change POD #2 (Directions: Adaptic over incision, Gauze, Roll Gauze, Ace Wrap)    FOLLOW-UP: OP Wound Clinic    DURABLE MEDICAL EQUIPMENT: None    OTHER: Persistent wounds, tenuous skin around this closure, may DC after dressing change tomorrow from ortho standpoint with wound care follow up      Eric Raman M.D.

## 2022-04-26 NOTE — ANESTHESIA PROCEDURE NOTES
Airway    Date/Time: 4/26/2022 2:24 PM  Performed by: Eric Powers M.D.  Authorized by: Eric Powers M.D.     Location:  OR  Urgency:  Elective  Indications for Airway Management:  Anesthesia      Spontaneous Ventilation: absent    Sedation Level:  Deep  Preoxygenated: Yes    Mask Difficulty Assessment:  0 - not attempted  Final Airway Type:  Supraglottic airway  Final Supraglottic Airway:  Standard LMA    SGA Size:  4  Number of Attempts at Approach:  1  Number of Other Approaches Attempted:  0
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
## 2022-04-26 NOTE — OR SURGEON
Immediate Post OP Note    PreOp Diagnosis: Left leg chronic wound with exposed bone      PostOp Diagnosis: Same      Procedure(s):  IRRIGATION AND DEBRIDEMENT, WOUND - LEG - Wound Class: Dirty or Infected  CLOSURE, WOUND - Wound Class: Dirty or Infected  OSTECTOMY - Wound Class: Dirty or Infected    Surgeon(s):  Eric Raman M.D.    Anesthesiologist/Type of Anesthesia:  Anesthesiologist: Eric Powers M.D./General    Surgical Staff:  Assistant: KATE Rivas  Circulator: Tamika Cat REKTA; Ioana Souza REKTA  Scrub Person: Tucker Hodges    Specimens removed if any:  ID Type Source Tests Collected by Time Destination   1 : Left leg wound culture Tissue Leg AEROBIC/ANAEROBIC CULTURE (SURGERY) Eric Raman M.D. 4/26/2022  2:36 PM    2 : Exposed bone left leg Bone Leg AEROBIC/ANAEROBIC CULTURE (SURGERY) Eric Raman M.D. 4/26/2022  2:36 PM        Estimated Blood Loss: 10cc    Findings: Poor skin quality, extensive calcification soft tissues    Complications: None        4/26/2022 3:07 PM Eric Raman M.D.

## 2022-04-26 NOTE — PROGRESS NOTES
LIMB PRESERVATION SERVICE     71 y.o.  with a past medical history that includes A-fib, HTN, Neurogenic bladder, Quadriplegia C5-C7 complete.  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         CT tib-fib:  1.  Old healed fracture with deformity involving distal lower leg with bridging bone between the tibia and fibula.  Associated dystrophic calcifications present in the medial soft tissues.  2.  Medial soft tissue thickening with probable ulceration just above the ankle.  3.  No soft tissue gas or focal bony destruction, however osteomyelitis is not entirely excluded.      Plan  N.p.o. midnight  Surgery tomorrow with Dr. Raman for left leg I&D with possible VAC

## 2022-04-26 NOTE — CARE PLAN
The patient is Stable - Low risk of patient condition declining or worsening    Shift Goals  Clinical Goals: NPO midnight  Patient Goals: rest  Family Goals: na    Progress made toward(s) clinical / shift goals:    Problem: Knowledge Deficit - Standard  Goal: Patient and family/care givers will demonstrate understanding of plan of care, disease process/condition, diagnostic tests and medications  Outcome: Progressing     Problem: Provide Safe Environment  Goal: Suicide environmental safety, protocols, policies, and practices will be implemented  Outcome: Progressing     Problem: Psychosocial  Goal: Patient's ability to identify and develop effective coping behaviors will improve  Outcome: Progressing       Patient is not progressing towards the following goals:
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
## 2022-04-26 NOTE — OR NURSING
Pt arrived from OR to PACU at 1501. Report and care of pt received from Anesthesiologist and RN. Pt placed on monitor with all alarms audible. Pt assessment complete, pt changed into gown and provided with warm blankets. Pt denies pain or nausea at this time.     1550- Report called to ZAIRA Shoemaker on [address]. Pt waiting for transport to [facility].

## 2022-04-26 NOTE — OP REPORT
DATE OF SERVICE:  04/26/2022     PREOPERATIVE DIAGNOSIS:  Chronic left lower leg wound with exposed bone.     POSTOPERATIVE DIAGNOSIS:  Chronic left lower leg wound with exposed bone.     PROCEDURES:  1.  Irrigation and debridement of multiple compartments of the left lower   extremity to bone.  2.  Bone excision partial of tibia.  3.  Complex closure of chronic wound.  4.  Biologic skin substitute placement.     SURGEON:  Eric Rmaan MD     ASSISTANT:  ADILSON Cavazos     ANESTHESIA:  General.     ESTIMATED BLOOD LOSS:  10 mL.     TOURNIQUET TIME:  None.     FINDINGS:  Friable poor quality soft tissues numerous areas of calcified   tissue degenerative bone within the wound base.     SPECIMENS:  Tissue for culture and bone for culture.     IMPLANTS:  MiMedx AmnioFix 4x4 cm.     COMPLICATIONS:  None.     OUTCOME:  PACU in stable condition.     HISTORY OF PRESENT ILLNESS:  This is a pleasant 71-year-old gentleman who has   been seen in wound care for chronic wound of his left leg with exposed bone.    He was referred to me for consideration of excision and closure.  He had been   admitted over the weekend for worsening of a sacral wound.  He was greeted in   the preoperative holding area and identified by name and medical record   number.  The left lower extremity was marked.  Risks of procedure including   bleeding, infection, pain, wound breakdown, and need for more surgery were   discussed.  He provided written consent.     DESCRIPTION OF PROCEDURE:  He was taken to the operating room and placed on   table in supine position.  Preoperative antibiotics were administered.    General anesthesia was induced.  Nonsterile tourniquet was placed on his left   thigh.  Left lower extremity prepped and draped in the usual sterile fashion.    Operative pause was taken where all present were in agreement with patient   identification, laterality, and procedure to be performed.  I elected not to   use a tourniquet.   We carefully excised out the exposed bone on the   posteromedial lower leg.  There were 2 incisions that I connected.  We   extended an additional centimeter proximally to excise this bone in its   entirety.  It was adherent to the subcutaneous tissue and that was quite   friable and fragile, dissected deeply and it was felt dangerous as it was   unclear within the amount of calcification where the neurovascular bundle.  We   were, however, able to remove the offending bone in its entirety and we sent   this for culture.  We did not encounter any tracking purulence.  We then   irrigated 3 liters of sterile normal saline through the wound.  We placed some   vancomycin powder in the wound bed.  We then covered remaining portion of the   tibia and the soft tissue with a 4x4 cm AmnioFix sheath.  We closed the   subcutaneous layer carefully with 3-0 Monocryl in buried interrupted fashion.    The skin was then closed with 3-0 nylon in figure-of-eight fashion.  I did   not feel that wound VAC would be helpful.  Adaptic gauze and compressive wrap   were placed.  He was awakened and extubated in stable condition.     POSTOPERATIVE COURSE:  He will remain under the care of the hospitalist   service.  I would like our wound care service to take a look at his wound   tomorrow and change his dressing.  If they feel comfortable with him   discharging tomorrow, I am certainly comfortable with close wound care   followup, weightbearing as tolerated.        ______________________________  MD ILEANA SHANKAR/LAZARO    DD:  04/26/2022 15:23  DT:  04/26/2022 16:40    Job#:  [id number]

## 2022-04-26 NOTE — DISCHARGE PLANNING
Received Choice form at 1223  Agency/Facility Name: [facility]  Referral not sent. Pending HH order and f2f. Choice saved in .    @0501-  Referral sent to [facility].     @5645-   Agency/Facility Name: [facility]  Spoke To: Doc  Outcome: Per Doc, agency needs the HH Order updated to reflect exactly how his nurses will need to treat the pt's wounds. In addition Doc noted that pt generally declines pt/ot and asked if PT/OT can be removed from HH order.     RN MATHEUS Woodall and RADHA Roberto notified. DPA to resend referral with updates.

## 2022-04-26 NOTE — ANESTHESIA TIME REPORT
Anesthesia Start and Stop Event Times     Date Time Event    4/26/2022 1410 Ready for Procedure     1417 Anesthesia Start     1503 Anesthesia Stop        Responsible Staff  04/26/22    Name Role Begin End    Eric Powers M.D. Anesth 1417 1503        Overtime Reason:  no overtime (within assigned shift)    Comments:

## 2022-04-26 NOTE — CARE PLAN
The patient is Stable - Low risk of patient condition declining or worsening    Shift Goals  Clinical Goals: I&D of leg  Patient Goals: rest  Family Goals: na    Progress made toward(s) clinical / shift goals:    Problem: Provide Safe Environment  Goal: Suicide environmental safety, protocols, policies, and practices will be implemented  Outcome: Progressing     Problem: Skin Integrity  Goal: Skin integrity is maintained or improved  Outcome: Progressing  Note: Patient turned Q2 hours.  On low air loss mattress.  Heel protectors in place.  Patient to have I&D today on leg.  No wound vac necessary.         Patient is not progressing towards the following goals:

## 2022-04-26 NOTE — ANESTHESIA PREPROCEDURE EVALUATION
Case: [id number] Anesthesia Start Date/Time: 04/26/22 1417    Procedures:       IRRIGATION AND DEBRIDEMENT, WOUND - LEG (Left )      OSTEOTOMY - ANKLE      CLOSURE, WOUND      APPLICATION OR REPLACEMENT, WOUND VAC    Location: [facility] OR  / SURGERY [facility]    Surgeons: Eric Raman M.D.          Relevant Problems   CARDIAC   (positive) Essential hypertension   (positive) Hypertension   (positive) Paroxysmal atrial flutter (HCC)         (positive) Nephrolithiasis      Other   (positive) Chronic incomplete quadriplegia (HCC)   (positive) Osteomyelitis of left ankle (HCC)   (positive) Quadriplegia, C5-C7 complete (HCC)       Physical Exam    Airway   Mallampati: II  TM distance: >3 FB  Neck ROM: full       Cardiovascular - normal exam  Rhythm: regular  Rate: normal  (-) murmur     Dental - normal exam           Pulmonary - normal exam  Breath sounds clear to auscultation     Abdominal    Neurological     Comments: Quadriplegia             Anesthesia Plan    ASA 3 (quadriplegia)   ASA physical status 3 criteria: other (comment)    Plan - general       Airway plan will be LMA          Induction: intravenous      Pertinent diagnostic labs and testing reviewed    Informed Consent:    Anesthetic plan and risks discussed with patient.    Use of blood products discussed with: patient whom consented to blood products.

## 2022-04-27 VITALS
TEMPERATURE: 97.3 F | RESPIRATION RATE: 16 BRPM | OXYGEN SATURATION: 97 % | BODY MASS INDEX: 34.4 KG/M2 | DIASTOLIC BLOOD PRESSURE: 56 MMHG | WEIGHT: 240.3 LBS | HEART RATE: 41 BPM | HEIGHT: 70 IN | SYSTOLIC BLOOD PRESSURE: 102 MMHG

## 2022-04-27 PROCEDURE — 99239 HOSP IP/OBS DSCHRG MGMT >30: CPT | Performed by: STUDENT IN AN ORGANIZED HEALTH CARE EDUCATION/TRAINING PROGRAM

## 2022-04-27 PROCEDURE — A9270 NON-COVERED ITEM OR SERVICE: HCPCS

## 2022-04-27 PROCEDURE — 700102 HCHG RX REV CODE 250 W/ 637 OVERRIDE(OP): Performed by: INTERNAL MEDICINE

## 2022-04-27 PROCEDURE — A9270 NON-COVERED ITEM OR SERVICE: HCPCS | Performed by: INTERNAL MEDICINE

## 2022-04-27 PROCEDURE — 700111 HCHG RX REV CODE 636 W/ 250 OVERRIDE (IP): Performed by: INTERNAL MEDICINE

## 2022-04-27 PROCEDURE — 700102 HCHG RX REV CODE 250 W/ 637 OVERRIDE(OP)

## 2022-04-27 PROCEDURE — 99232 SBSQ HOSP IP/OBS MODERATE 35: CPT

## 2022-04-27 RX ADMIN — SENNOSIDES 17.2 MG: 8.6 TABLET, FILM COATED ORAL at 06:17

## 2022-04-27 RX ADMIN — BACLOFEN 20 MG: 10 TABLET ORAL at 11:22

## 2022-04-27 RX ADMIN — OXYCODONE HYDROCHLORIDE AND ACETAMINOPHEN 1000 MG: 500 TABLET ORAL at 06:15

## 2022-04-27 RX ADMIN — BACLOFEN 20 MG: 10 TABLET ORAL at 06:14

## 2022-04-27 RX ADMIN — ENOXAPARIN SODIUM 40 MG: 40 INJECTION SUBCUTANEOUS at 06:18

## 2022-04-27 RX ADMIN — DOCUSATE SODIUM 200 MG: 100 CAPSULE, LIQUID FILLED ORAL at 06:14

## 2022-04-27 ASSESSMENT — PAIN DESCRIPTION - PAIN TYPE: TYPE: ACUTE PAIN;CHRONIC PAIN;SURGICAL PAIN

## 2022-04-27 NOTE — ANESTHESIA POSTPROCEDURE EVALUATION
Patient: Osvaldo Pate    Procedure Summary     Date: 04/26/22 Room / Location: [place] [address] / SURGERY [facility]    Anesthesia Start: 1417 Anesthesia Stop: 1503    Procedures:       IRRIGATION AND DEBRIDEMENT, WOUND - LEG (Left Leg Lower)      CLOSURE, WOUND (Left Leg Lower)      OSTECTOMY (Left Leg Lower) Diagnosis: (Osteomyelitis of left ankle)    Surgeons: Eric Raman M.D. Responsible Provider: Eric Powers M.D.    Anesthesia Type: general ASA Status: 3          Final Anesthesia Type: general  Last vitals  BP   Blood Pressure : 126/72    Temp   36.5 °C (97.7 °F)    Pulse   (!) 49   Resp   15    SpO2   93 %      Anesthesia Post Evaluation    Patient location during evaluation: PACU  Patient participation: complete - patient participated  Level of consciousness: awake and alert  Pain score: 0    Airway patency: patent  Anesthetic complications: no  Cardiovascular status: hemodynamically stable  Respiratory status: acceptable  Hydration status: euvolemic    PONV: none          There were no known complications for this encounter.     Nurse Pain Score: 0 (NPRS)

## 2022-04-27 NOTE — DISCHARGE PLANNING
Agency/Facility Name: [facility]   Spoke To: Sarah Beth   Outcome: Per , patient will need the following:   Wound Care notes and will not accept until received   PT/OT   DC Summary.     LSW notified     **DPA hardfaxxed  Updated information**       5710  [facility]   Spoke To: Doc   Outcome: Per , patients order will need to be changed to 72 hours instead pf 48 hours since facility can not accommodate that particular need.     RN MATHEUS notified        1602  [facility]   Spoke To: Doc   Outcome: DPA confirmed patients wound care order and confirmation that patients transport is arranged for 1500 today.     LSW notified
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
## 2022-04-27 NOTE — DISCHARGE PLANNING
Anticipated Discharge Disposition:  with HH    Action: LSW attempted to call [facility]  ([phone]) to inform them Pt is ready to d/c today. No answer and unable to leave voicemail.     LSW called [facility] again and spoke with caregiver who said they do not need anything for Pt to return home. LSW will call back when transport is scheduled to update then on time.     Barriers to Discharge: Pending HH    Plan: LSW to f/u with  HH referral

## 2022-04-27 NOTE — CARE PLAN
The patient is Stable - Low risk of patient condition declining or worsening    Shift Goals  Clinical Goals: wound integrity  Patient Goals: infection prevention  Family Goals: na    Progress made toward(s) clinical / shift goals:  Pt refused writer to take of bandages. Pt refused 0200 turn and education provided.     Patient is not progressing towards the following goals:      Problem: Skin Integrity  Goal: Skin integrity is maintained or improved  Outcome: Not Progressing

## 2022-04-27 NOTE — DISCHARGE INSTRUCTIONS
"    Discharge Instructions    Discharged to group home by medical transportation with escort. Discharged via wheelchair, hospital escort: Yes.  Special equipment needed: Not Applicable    Be sure to schedule a follow-up appointment with your primary care doctor or any specialists as instructed.     Discharge Plan:        I understand that a diet low in cholesterol, fat, and sodium is recommended for good health. Unless I have been given specific instructions below for another diet, I accept this instruction as my diet prescription.   Other diet: as tolerTED    Special Instructions:   Take medications as directed   Wound care orders and outpatient wound care set up   Follow up with orthopedic surgery for post op follow up   Left medial Lower extremity incision: cleanse with Normal Saline, pat dry. Apply single layer of Adaptic over incision. Secure with 4x4 gauze, Roll Gauze, Ace Wrap. Change every 3 times a week  SACRUM: remove dressing and packing. Cleanse wound with wound cleanser and gauze. Pat dry. Apply barrier paste to shira-wound. Using a cotton tip applicator pack wound using one CONTINUOUS piece of 1/4\" Silver strip packing (Ensure a tail is left out of wound for ease of removal as well as to wick away drainage). Secure in place with an mepilex. Nursing to change 3 times a week and as needed dislodgement and or drainage saturation.      · Is patient discharged on Warfarin / Coumadin?   No     Depression / Suicide Risk    As you are discharged from this [facility] facility, it is important to learn how to keep safe from harming yourself.    Recognize the warning signs:  · Abrupt changes in personality, positive or negative- including increase in energy   · Giving away possessions  · Change in eating patterns- significant weight changes-  positive or negative  · Change in sleeping patterns- unable to sleep or sleeping all the time   · Unwillingness or inability to communicate  · Depression  · Unusual " sadness, discouragement and loneliness  · Talk of wanting to die  · Neglect of personal appearance   · Rebelliousness- reckless behavior  · Withdrawal from people/activities they love  · Confusion- inability to concentrate     If you or a loved one observes any of these behaviors or has concerns about self-harm, here's what you can do:  · Talk about it- your feelings and reasons for harming yourself  · Remove any means that you might use to hurt yourself (examples: pills, rope, extension cords, firearm)  · Get professional help from the community (Mental Health, Substance Abuse, psychological counseling)  · Do not be alone:Call your Safe Contact- someone whom you trust who will be there for you.  · Call your local CRISIS HOTLINE 844-0167 or 047-302-5032  · Call your local Children's Mobile Crisis Response Team [place] (368) 150-7172 or www.[employer]  · Call the toll free National Suicide Prevention Hotlines   · National Suicide Prevention Lifeline 761-332-FXIA (3366)  · National Hope Line Network 800-SUICIDE (757-4684)

## 2022-04-27 NOTE — PROGRESS NOTES
Assumed care for pt at 1900. Pt A/Ox4 and can make needs known. Call light within reach, bed locked in lowest position and hourly rounding initiated. Labs/orders reviewed and communication board updated.

## 2022-04-27 NOTE — PROGRESS NOTES
DC instructions given and signed, brigid lift from bed to powered wheelchair done, dressing on sacral area checked and was CDI, copy of wound care instructions given to pt, belongings x2 bags with [facility] transport.

## 2022-04-27 NOTE — DISCHARGE PLANNING
DC Transport Scheduled    Received request at: 1429    Transport Company Scheduled:  [facility]      Scheduled Date: 04/27/2022  Scheduled Time: 1700    Destination: [address] [place]    Notified care team of scheduled transport via Voalte.     If there are any changes needed to the DC transportation scheduled, please contact [facility] at ext. [phone] between the hours of 1544-6013 Mon-Fri. If outside those hours, contact the ED Case Manager at ext. [phone].

## 2022-04-27 NOTE — PROGRESS NOTES
Aaox4, LLE dressing CDI, hungry, POC discussed, wound dressing change, q2 turn, SNF placement, needs attended.

## 2022-04-27 NOTE — PROGRESS NOTES
" [facility]ICE   POST SURGICAL PROGRESS NOTE      HPI:  Osvaldo Pate is a 71 y.o.  with a past medical history that includes A-fib, HTN, Neurogenic bladder, Quadriplegia C5-C7 complete.  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        LPS has been consulted for left medial ankle bone with exposed bone .  The ulcer started again in January of 2022. This has been an ongoing problem for \"several years\" per the patient. He states that he does not recall any type of injury to the ankle and states that his home health nurse noticed a small open wound. He states that this has been treated with wound care through his HH since. He was admitted to the Acute Care setting on 4/1/22 due an increased odor to the wound and exposed bone. Xray upon this admission was not diagnostic for osteomyelitis.   He presented to the Wound Clinic and a referral to Orthopedic surgery was initiated. Patient seen by Dr Raman at the [facility] on 4/22/22. Patient was advised to present to the hospital for acute management for osteomyelitis to left leg wound, further testing, and plan for surgery.     SURGERY DATE: 4/26/2022 by Dr Raman   PROCEDURE:   1.  Irrigation and debridement of multiple compartments of the left lower   extremity to bone.  2.  Bone excision partial of tibia.  3.  Complex closure of chronic wound.  4.  Biologic skin substitute placement.      INTERVAL HISTORY:  4/27/2022: POD #1.  Patient denies fevers, chills, nausea, vomiting.   No pain with dressing changes as patient is quadriplegic. Patient anxious to be able to DC back to .        PERTINENT LPS RESULTS:   Pathology: None   OR culture: Still in process    Status: In process     Visible to patient: No (not released)     Next appt: None    Specimen Information: Bone         0 Result Notes    Component 1 d ago   Significant Indicator NEG    Source BONE    Site exposed left leg bone    Culture " "Result -    Gram Stain Result No organisms seen.           SURGICAL SITE EXAM:      /56   Pulse (!) 41   Temp 36.3 °C (97.3 °F) (Temporal)   Resp 16   Ht 1.778 m (5' 10\")   Wt 109 kg (240 lb 4.8 oz)   SpO2 97%   BMI 34.48 kg/m²     Sensation: insensate  Surgical foot warm     Pedal Pulses:   R foot: unable to palpate PT/DP. With doppler brisk tones noted to PT/DP  L foot: unable to palpate PT/DP. With doppler brisk tones noted to PT/DP      Incision   location: Left medial LE    Incision well approximated, sutures intact.   No Erythema  No Edema  Slight bloody Drainage    dressing care completed by LPS APRN. Cleansed with NS, patted dry. Applied single strip of Adaptic over incision. Secured with 4x4 gauze, roll guaze, and Ace wrap.       Photo(s):         DIABETES MANAGEMENT:    A1c: No results found for: HBA1C         INFECTION MANAGEMENT:    Results from last 7 days   Lab Units 04/24/22  0427 04/22/22  1655   WBC 1501 K/uL 4.7* 5.9   PLATELET COUNT 1518 K/uL 148* 148*     Wound culture results:   Results     Procedure Component Value Units Date/Time    CULTURE TISSUE W/ GRM STAIN [[id number]] Collected: 04/26/22 1436    Order Status: No result Specimen: Tissue Updated: 04/27/22 0722     Significant Indicator NEG     Source TISS     Site left leg     Culture Result -     Gram Stain Result Rare WBCs.  No organisms seen.      Narrative:      Surgery Specimen    Anaerobic Culture [[id number]] Collected: 04/26/22 1436    Order Status: Completed Specimen: Tissue Updated: 04/27/22 0722     Significant Indicator NEG     Source TISS     Site left leg     Culture Result Culture in progress.    Narrative:      Surgery Specimen    CULTURE TISSUE W/ GRM STAIN [[id number]] Collected: 04/26/22 1436    Order Status: No result Specimen: Bone Updated: 04/27/22 0722     Significant Indicator NEG     Source BONE     Site exposed left leg bone     Culture Result -     Gram Stain Result No organisms seen.    Narrative:      " Surgery Specimen    Anaerobic Culture [[id number]] Collected: 04/26/22 1436    Order Status: Completed Specimen: Bone Updated: 04/27/22 0722     Significant Indicator NEG     Source BONE     Site exposed left leg bone     Culture Result Culture in progress.    Narrative:      Surgery Specimen    GRAM STAIN [[id number]] Collected: 04/26/22 1436    Order Status: Completed Specimen: Bone Updated: 04/26/22 1935     Significant Indicator .     Source BONE     Site exposed left leg bone     Gram Stain Result No organisms seen.    Narrative:      Surgery Specimen    GRAM STAIN [[id number]] Collected: 04/26/22 1436    Order Status: Completed Specimen: Tissue Updated: 04/26/22 1934     Significant Indicator .     Source TISS     Site left leg     Gram Stain Result Rare WBCs.  No organisms seen.      Narrative:      Surgery Specimen    Blood Culture,Hold [[id number]] Collected: 04/22/22 1625    Order Status: Completed Updated: 04/22/22 1851     Blood Culture Hold Collected           ASSESSMENT/PLAN:   POD #1 S/P Irrigation and debridement of multiple compartments of the left lower extremity to bone, bone excision partial of tibia, complex closure of chronic wound, biologic skin substitute placement by Dr Raman on 4/26/2022. Incision well approximated. Dressing orders updated and provided to Unit CM team for [facility]. Referral in place for Wound clinic due to sacral ulcer. Patient aware to follow up with Dr Raman for suture removal.       Wound care:   -Wound care orders updated for nursing by LPS   -Left medial LE:  Cleansed with NS, patted dry. Applied single strip of Adaptic over incision. Secured with 4x4 gauze, roll guaze, and Ace wrap.     Imaging/Labs:  -COVID-19: not detected this admission     Vascular status:   -R foot: unable to palpate PT/DP. With doppler brisk tones noted to PT/DP  - L foot: unable to palpate PT/DP. With doppler brisk tones noted to PT/DP    Surgery:   --no further surgeries planned at this  time     Antibiotics:   -ID not involved. No antibiotics at this time     Weight Bearing Status:   -Weight bearing as tolerated, patient is quadriplegic     Offloading:   WC      PT/OT:   -not involved.      Quadriplegic   - Implications of loss of protective sensation (LOPS) discussed with patient- including increased risk for amputation.  Advised to check feet at least daily, moisturize feet, and to always wear protective foot wear.   -avoid trimming own nails. See podiatrist or certified foot and nail RN      DISCHARGE PLAN:    Disposition:   -referrals have been placed for wound clinic   -recommend patient discharges to Group Home with home health and wound care clinic  - Ok to discharge from LPS/ortho standpoint    Follow-up: Dr. Raman. Sutures to be removed approximately 3 weeks post-op  Follow up: wound care clinic referral placed   Does not need wound care clinic or LPS follow up at this time as patient has an incision without open wound.      Discussed with: pt, RN, Dr. Gonzales, Unit  Team       Please note that this dictation was created using voice recognition software. I have  worked with technical experts from [employer] to optimize the interface.  I have made every reasonable attempt to correct obvious errors, but there may be errors of grammar and possibly content that I did not discover before finalizing the note.      Claritza Matthews, A.P.R.N.    If any questions or concerns, please contact [facility] through voalte.

## 2022-04-27 NOTE — CARE PLAN
Problem: Knowledge Deficit - Standard  Goal: Patient and family/care givers will demonstrate understanding of plan of care, disease process/condition, diagnostic tests and medications  Note: Wound care, SNF placement     Problem: Psychosocial  Goal: Patient's ability to identify and develop effective coping behaviors will improve  Note: POC discussed   The patient is Watcher - Medium risk of patient condition declining or worsening    Shift Goals  Clinical Goals: wound integrity  Patient Goals: infection prevention  Family Goals: na    Progress made toward(s) clinical / shift goals:  SNF placement, monitor for wound infection    Patient is not progressing towards the following goals:

## 2022-04-28 LAB
BACTERIA TISS AEROBE CULT: ABNORMAL
GRAM STN SPEC: ABNORMAL
GRAM STN SPEC: ABNORMAL
SIGNIFICANT IND 70042: ABNORMAL
SIGNIFICANT IND 70042: ABNORMAL
SITE SITE: ABNORMAL
SITE SITE: ABNORMAL
SOURCE SOURCE: ABNORMAL
SOURCE SOURCE: ABNORMAL

## 2022-05-01 LAB
BACTERIA SPEC ANAEROBE CULT: NORMAL
BACTERIA SPEC ANAEROBE CULT: NORMAL
SIGNIFICANT IND 70042: NORMAL
SIGNIFICANT IND 70042: NORMAL
SITE SITE: NORMAL
SITE SITE: NORMAL
SOURCE SOURCE: NORMAL
SOURCE SOURCE: NORMAL

## 2022-05-03 ENCOUNTER — OFFICE VISIT (OUTPATIENT)
Dept: WOUND CARE | Facility: MEDICAL CENTER | Age: 72
End: 2022-05-03
Attending: INTERNAL MEDICINE
Payer: MEDICARE

## 2022-05-03 VITALS
OXYGEN SATURATION: 94 % | HEART RATE: 52 BPM | RESPIRATION RATE: 18 BRPM | DIASTOLIC BLOOD PRESSURE: 73 MMHG | SYSTOLIC BLOOD PRESSURE: 142 MMHG | TEMPERATURE: 99.1 F

## 2022-05-03 DIAGNOSIS — S81.802D WOUND OF LEFT LOWER EXTREMITY, SUBSEQUENT ENCOUNTER: ICD-10-CM

## 2022-05-03 DIAGNOSIS — L89.626 PRESSURE INJURY OF DEEP TISSUE OF LEFT HEEL: ICD-10-CM

## 2022-05-03 DIAGNOSIS — G82.53 QUADRIPLEGIA, C5-C7, COMPLETE (HCC): ICD-10-CM

## 2022-05-03 DIAGNOSIS — L89.154 SACRAL DECUBITUS ULCER, STAGE IV (HCC): ICD-10-CM

## 2022-05-03 PROCEDURE — 11043 DBRDMT MUSC&/FSCA 1ST 20/<: CPT | Performed by: NURSE PRACTITIONER

## 2022-05-03 PROCEDURE — 99214 OFFICE O/P EST MOD 30 MIN: CPT | Mod: 25 | Performed by: NURSE PRACTITIONER

## 2022-05-03 PROCEDURE — 99213 OFFICE O/P EST LOW 20 MIN: CPT | Mod: 25

## 2022-05-03 PROCEDURE — 11043 DBRDMT MUSC&/FSCA 1ST 20/<: CPT

## 2022-05-03 ASSESSMENT — ENCOUNTER SYMPTOMS
NAUSEA: 0
CHILLS: 0
FEVER: 0
ROS GI COMMENTS: COLOSTOMY IN PLACE
VOMITING: 0
COUGH: 0
CONSTIPATION: 0
DIARRHEA: 0
SHORTNESS OF BREATH: 0
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
## 2022-05-03 NOTE — PATIENT INSTRUCTIONS
-Keep dressings clean and dry. Change dressings only if they become over saturated, soiled or fall off. Otherwise, your home health nurse will change your dressings between wound clinic visits.    -Avoid prolonged standing or sitting without elevating your legs.    -Should you experience any significant changes in your wounds, such as signs of infection (increasing redness, swelling, localized heat, increased pain, fever > 101 F, chills) or have any questions regarding your home care instructions, please contact the wound center at [phone]. If after hours, contact your primary care physician or go to the hospital emergency room.     -If you are 5 or more minutes late for an appointment, we reserve the right to cancel and reschedule that appointment. Additionally, if you are habitually late or not showing (3 late cancellations and/or no shows), we reserve the right to cancel your remaining appointments and it will be your responsibility to obtain a new referral if services are still needed.

## 2022-05-10 NOTE — DOCUMENTATION QUERY
[facility] hospital                                                                       Query Response Note      PATIENT:               NICHOLAS CASEY  ACCT #:                  [id number]  MRN:                     [id number]  :                      1950  ADMIT DATE:       2022 3:29 PM  DISCH DATE:          RESPONDING  PROVIDER #:        [id number]           QUERY TEXT:    The discharge summary indicates that the patient has a history of osteomyelitis. Please clarify if the patient still has osteomyelitis or if this resolved when they finished their course of antibiotics in March.          If you have any questions please contact:   Yumiko Roman RN [facility]   [email or web address]   Yumiko Roman [address]       The patient's clinical indicators include:  71-year-old paraplegic male who recently finished a course of antibiotics for osteomyelitis. Was admitted for a new sacral ulcer and a left shin ulcer with visible bone.      H&P James ?Nonhealing ulcer of the left lower extremity with necrosis of muscle.?       [place] APRN ?Patient seen by Dr. Raman at the [facility] on . Patient was advised to present to the hospital for acute management for osteomyelitis to the left leg wound, further testing and plan for surgery.?   ?Xray completed and is not diagnostic for osteomyelitis.?      Danisha ?Encounter diagnosis: Osteomyelitis of left ankle, unspecified type (HCC).?       Anesthesia Priya ?(positive) Osteomyelitis of left ankle       OP report Danisha YuChronic left lower leg wound with exposed bone.?   ?Friable poor quality soft tissues numerous areas of calcified tissue degenerative bone within the wound base.?       Gonzales ?NO Signs of infection, hold on abx therapy.?       X-ray ?No soft tissue gas or focal bony destruction, however osteomyelitis is not entirely excluded.?     The  finding of   Proteus mirabilis of left leg bone is documented in the pathology report.     Risk factors: history of osteomyelitis with recent ABX, paraplegia, exposed bone     Treatment: X-ray, Labs, OR bone excision, sent for culture and irrigation  Options provided:   -- Osteomyelitis is a current diagnosis this admission   -- Osteomyelitis is a current diagnosis this admission, treated with OR procedures   -- Osteomyelitis resolved prior to arrival   -- Other      Query created by: Yumiko Roman on 5/6/2022 8:11 AM    RESPONSE TEXT:    Osteomyelitis resolved prior to arrival          Electronically signed by:  ITZEL RAMSEY MD 5/10/2022 7:39 AM

## 2022-05-11 ENCOUNTER — NON-PROVIDER VISIT (OUTPATIENT)
Dept: CARDIOLOGY | Facility: MEDICAL CENTER | Age: 72
End: 2022-05-11
Payer: MEDICARE

## 2022-05-11 PROCEDURE — 93298 REM INTERROG DEV EVAL SCRMS: CPT | Performed by: INTERNAL MEDICINE

## 2022-05-12 ENCOUNTER — OFFICE VISIT (OUTPATIENT)
Dept: WOUND CARE | Facility: MEDICAL CENTER | Age: 72
End: 2022-05-12
Attending: INTERNAL MEDICINE
Payer: MEDICARE

## 2022-05-12 VITALS
TEMPERATURE: 97.6 F | SYSTOLIC BLOOD PRESSURE: 79 MMHG | OXYGEN SATURATION: 96 % | DIASTOLIC BLOOD PRESSURE: 48 MMHG | RESPIRATION RATE: 18 BRPM | HEART RATE: 60 BPM

## 2022-05-12 DIAGNOSIS — L89.893 PRESSURE INJURY OF LEFT LEG, STAGE 3 (HCC): ICD-10-CM

## 2022-05-12 DIAGNOSIS — G82.53 QUADRIPLEGIA, C5-C7, COMPLETE (HCC): ICD-10-CM

## 2022-05-12 DIAGNOSIS — L89.154 SACRAL DECUBITUS ULCER, STAGE IV (HCC): ICD-10-CM

## 2022-05-12 DIAGNOSIS — T14.8XXA DEEP TISSUE INJURY: ICD-10-CM

## 2022-05-12 DIAGNOSIS — T81.31XD POSTOPERATIVE WOUND DEHISCENCE, SUBSEQUENT ENCOUNTER: ICD-10-CM

## 2022-05-12 PROCEDURE — 99215 OFFICE O/P EST HI 40 MIN: CPT | Mod: 25

## 2022-05-12 PROCEDURE — 99213 OFFICE O/P EST LOW 20 MIN: CPT | Mod: 25 | Performed by: NURSE PRACTITIONER

## 2022-05-12 PROCEDURE — 11043 DBRDMT MUSC&/FSCA 1ST 20/<: CPT | Performed by: NURSE PRACTITIONER

## 2022-05-12 PROCEDURE — 11043 DBRDMT MUSC&/FSCA 1ST 20/<: CPT

## 2022-05-12 ASSESSMENT — ENCOUNTER SYMPTOMS
VOMITING: 0
NAUSEA: 0
ROS GI COMMENTS: COLOSTOMY IN PLACE
DIARRHEA: 0
FEVER: 0
SHORTNESS OF BREATH: 0
COUGH: 0
CONSTIPATION: 0
CHILLS: 0

## 2022-05-12 NOTE — PATIENT INSTRUCTIONS
-Keep your wound dressing clean, dry, and intact.    -Change your dressing if it becomes soiled, soaked, or falls off.    -Should you experience any significant changes in your wound(s), such as infection (redness, swelling, localized heat, increased pain, fever > 101 F, chills) or have any questions regarding your home care instructions, please contact the wound center at [phone]. If after hours, contact your primary care physician or go to the hospital emergency room.
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
## 2022-05-12 NOTE — PROGRESS NOTES
Updated wound care order faxed to [facility] at fax #[phone].  NPWT order faxed to [facility], will initiate NPWT to sacrum next Friday in clinic 5/20/22

## 2022-05-17 ENCOUNTER — TELEPHONE (OUTPATIENT)
Dept: WOUND CARE | Facility: MEDICAL CENTER | Age: 72
End: 2022-05-17
Payer: MEDICARE

## 2022-05-17 NOTE — TELEPHONE ENCOUNTER
Submitted for [employer] products through portal for either/or [employer] or [email or web address].

## 2022-05-18 NOTE — CARDIAC REMOTE MONITOR - SCAN
Device transmission reviewed. Device demonstrated appropriate function.       Electronically Signed by: Lex Skinner M.D.    5/26/2022  10:46 AM

## 2022-05-19 ENCOUNTER — HOSPITAL ENCOUNTER (OUTPATIENT)
Facility: MEDICAL CENTER | Age: 72
End: 2022-05-19
Attending: PHYSICIAN ASSISTANT
Payer: MEDICARE

## 2022-05-19 PROCEDURE — 87086 URINE CULTURE/COLONY COUNT: CPT

## 2022-05-20 ENCOUNTER — OFFICE VISIT (OUTPATIENT)
Dept: WOUND CARE | Facility: MEDICAL CENTER | Age: 72
End: 2022-05-20
Attending: INTERNAL MEDICINE
Payer: MEDICARE

## 2022-05-20 VITALS
RESPIRATION RATE: 18 BRPM | OXYGEN SATURATION: 95 % | SYSTOLIC BLOOD PRESSURE: 149 MMHG | HEART RATE: 55 BPM | TEMPERATURE: 97.9 F | DIASTOLIC BLOOD PRESSURE: 86 MMHG

## 2022-05-20 DIAGNOSIS — L89.316 PRESSURE INJURY OF DEEP TISSUE OF RIGHT BUTTOCK: ICD-10-CM

## 2022-05-20 DIAGNOSIS — L89.154 SACRAL DECUBITUS ULCER, STAGE IV (HCC): ICD-10-CM

## 2022-05-20 DIAGNOSIS — G82.53 QUADRIPLEGIA, C5-C7, COMPLETE (HCC): Primary | ICD-10-CM

## 2022-05-20 DIAGNOSIS — L89.893 PRESSURE INJURY OF LEFT LEG, STAGE 3 (HCC): ICD-10-CM

## 2022-05-20 DIAGNOSIS — T81.31XD POSTOPERATIVE WOUND DEHISCENCE, SUBSEQUENT ENCOUNTER: ICD-10-CM

## 2022-05-20 PROCEDURE — 99215 OFFICE O/P EST HI 40 MIN: CPT | Mod: 25

## 2022-05-20 PROCEDURE — 97605 NEG PRS WND THER DME<=50SQCM: CPT | Mod: XU

## 2022-05-20 PROCEDURE — 15271 SKIN SUB GRAFT TRNK/ARM/LEG: CPT

## 2022-05-20 PROCEDURE — 11043 DBRDMT MUSC&/FSCA 1ST 20/<: CPT

## 2022-05-20 PROCEDURE — 99999 PR NO CHARGE: CPT | Mod: 25 | Performed by: NURSE PRACTITIONER

## 2022-05-20 PROCEDURE — 11043 DBRDMT MUSC&/FSCA 1ST 20/<: CPT | Mod: 59 | Performed by: NURSE PRACTITIONER

## 2022-05-20 PROCEDURE — 15271 SKIN SUB GRAFT TRNK/ARM/LEG: CPT | Performed by: NURSE PRACTITIONER

## 2022-05-20 ASSESSMENT — ENCOUNTER SYMPTOMS
ROS GI COMMENTS: COLOSTOMY IN PLACE
COUGH: 0
DIARRHEA: 0
VOMITING: 0
NAUSEA: 0
CHILLS: 0
SHORTNESS OF BREATH: 0
CONSTIPATION: 0
FEVER: 0

## 2022-05-20 NOTE — PROGRESS NOTES
Provider Encounter- Pressure Injury        HISTORY OF PRESENT ILLNESS  Wound History:    START OF CARE IN CLINIC: 5/3/2022    REFERRING PROVIDER: Sahara Mendez      WOUNDS- Pressure Injury, Sacrococcygeal, stage IV                                                             Surgical wound dehiscence -status post surgical I&D of stage IV pressure injury and biologic application                      Pressure injury, stage III, left posterior lower leg-first observed in clinic 5/12/2022                                 Pressure injury, right buttock/lower back-first observed in clinic 5/20/2022     HISTORY: Patient with history of incomplete quadriplegia referred to [facility] for treatment of a stage IV pressure injury.  He has a history of previous pressure injuries to this area, and underwent muscle flaps in 2019, and then again in 2020.  He was seen in the wound clinic in November 2021 for an ulcer proximal from his current ulcer, and pressure injuries to his left posterior lower leg and left heel.  At that time, it was discovered that the patient had retained VAC foam embedded in the wound bed of the sacral wound.  Attempts were made to get him back to his plastic surgeon, though unsuccessful.  In January he underwent surgical removal of VAC sponge along with excisional debridement of his sacral wound by Dr. Chaves.  After the surgery, his wound went on to heal without incident.   In early April 2022, his home health nurse noted a new sacral ulcer, below the previous ulcer which quickly tripled in size over the following weeks.  The ulcer to his left medial lower leg had also deteriorated, with bone visible at the base..  He was hospitalized from 4/22 until 4/27/2022 and underwent surgery with Dr. Raman on 4/26 for irrigation and debridement of multiple compartments of the left lower extremity, bone excision, and complex closure of chronic wound using biologic skin substitute.   His sacrococcygeal wound was not  surgically addressed during this admission.  He was discharged back to his group home, with home health, and referral to outpatient wound clinic for his sacral wound.  He was instructed to follow-up with his surgeon for his lower leg wound.       Postoperatively, the left medial lower leg incision dehisced.  He was seen by his surgeon at [facility] on 5/11.  The surgeon opted to leave remaining sutures in place, and refer him to the wound clinic for treatment of this wound.   Treatment of this wound was initiated in clinic on 5/12.  During this visit was also noted that his heel DTI had resolved, but that he had a new pressure injury to his left posterior lower extremity.     A new pressure injury was noted to patient's right upper buttock/lower back on 5/20/2022.  Wound was linear in shape, skin discolored but intact.    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  Never used smokeless tobacco    Patient's problem list, allergies, and current medications reviewed and updated in Epic    Interval History:  5/3/2022 : Clinic visit with ADILSON Arthur, FNPEGGY-BC, CWDINESHN, CFCN.  Patient states he is feeling well overall, denies fevers, chills, nausea, vomiting, cough or shortness of breath.  During his initial assessment, a piece of fenestrated tubing, approximately 6 inches long, 3 to 5 mm in diameter, was identified and removed from his sacral wound.  Patient informed me that after his flap procedure in 2020 it was believed that part of a drain tube was retained within his wound.  The surgeon at the time decided not to explore the wound.   The surgical dressing was removed from his left lower leg  wound.  Steri-Strips in place over biologic.  Patient informed me that he has a follow-up appointment with Dr. Raman a week from tomorrow (5/11).  Patient is under the impression that Dr. Raman will be managing this wound.      5/12/2022 : Clinic visit with ADILSON Arthur, HOLDEN, IMAN, LILIYA.  Anjum states he is feeling very well.  He was seen by Dr. Raman yesterday who was concerned with the appearance of his lower leg wound.  Per Dr. Raman's note, sutures to be left in place for another week.  No residual biologic product noted within wound bed.  We will request authorization to continue.   Patient sacral wound is with significantly less slough than last week.  Discussed with patient treating with VAC.  Given his past history of retained foam, expected him to be resistant to the idea, however he stated he was willing to do what ever necessary to expedite healing of this wound.   Patient is up in his wheelchair 7 to 8 hours/day, has appropriate pressure relief cushion in his chair, and the ability to raise and lower back to his wheelchair mechanically.  He also knows to physically reposition himself every 1/2 hour.  He has a low-air-loss bed on his mattress, with alternating pressure.  He is very well versed on pressure-relieving techniques.    5/20/2022 : Clinic visit with ADILSON Arthur, HOLDEN, IMAN, LILIYA.  Anjum states he is feeling well today.  He understands that the VAC is to be initiated on his sacral wound today.  He verbalizes some worry about possibility of VAC retention, as this was a problem before.  I assured him that we would provide appropriate precautions, and would make sure that we write on the outside of the dressing number of pieces of foam used, and we would be communicating with home health.   We have also been approved for biologic to his left posterior lower leg wound and will be initiating this today.  Sutures also removed as recommended by Dr. Raman.  The smaller, more  superficial wound to his posterior lower leg appears to be crusting adequately          REVIEW OF SYSTEMS:   Review of Systems   Constitutional: Negative for chills and fever.   Respiratory: Negative for cough and shortness of breath.    Cardiovascular: Negative for chest pain.   Gastrointestinal: Negative for constipation, diarrhea, nausea and vomiting.        Colostomy in place   Genitourinary:        Suprapubic catheter    Musculoskeletal:        Incomplete quadriplegia       PHYSICAL EXAMINATION:   BP (!) 149/86 (BP Location: Left arm, Patient Position: Sitting) Comment: RN notified  Pulse (!) 55 Comment: RN notified  Temp 36.6 °C (97.9 °F) (Temporal)   Resp 18   SpO2 95%   Physical Exam  Constitutional:       Appearance: He is obese.   HENT:      Head: Normocephalic.   Eyes:      Pupils: Pupils are equal, round, and reactive to light.   Pulmonary:      Effort: Pulmonary effort is normal.   Abdominal:      Palpations: Abdomen is soft.   Skin:     Comments: Coccygeal pressure injury, stage IV     Neurological:      Mental Status: He is alert.         WOUND ASSESSMENT  Wound 04/11/22 Full Thickness Wound Leg Medial Left --Left Medial Lower Leg (Active)   Wound Image    05/20/22 1500   Site Assessment Red;Pink;Yellow 05/20/22 1500   Periwound Assessment Edema;Fragile 05/20/22 1500   Margins Unattached edges 05/20/22 1500   Drainage Amount Moderate 05/20/22 1500   Drainage Description Serosanguineous 05/20/22 1500   Treatments Cleansed;Provider debridement 05/20/22 1500   Wound Cleansing Normal Saline Irrigation 05/20/22 1500   Periwound Protectant Skin Protectant Wipes to Periwound;Adaptic 05/20/22 1500   Dressing Cleansing/Solutions Normal Saline 05/20/22 1500   Dressing Options Biologic (Skin Substitute);Hydrofiber;Adaptic;Steri-Strip;Mepilex Heel;Tubigrip 05/20/22 1500   Dressing Changed New 05/20/22 1500   Dressing Status Clean;Dry;Intact 04/11/22 1330   Dressing Change/Treatment Frequency Monday,  Wednesday, Friday, and As Needed 05/20/22 1500   Non-staged Wound Description Full thickness 05/20/22 1500   Wound Length (cm) 3.6 cm 05/20/22 1500   Wound Width (cm) 1.4 cm 05/20/22 1500   Wound Depth (cm) 1.3 cm 05/20/22 1500   Wound Surface Area (cm^2) 5.04 cm^2 05/20/22 1500   Wound Volume (cm^3) 6.552 cm^3 05/20/22 1500   Post-Procedure Length (cm) 3.6 cm 05/20/22 1500   Post-Procedure Width (cm) 1.6 cm 05/20/22 1500   Post-Procedure Depth (cm) 1.3 cm 05/20/22 1500   Post-Procedure Surface Area (cm^2) 5.76 cm^2 05/20/22 1500   Post-Procedure Volume (cm^3) 7.488 cm^3 05/20/22 1500   Wound Healing % -[id number] 05/20/22 1500   Tunneling (cm) 2 cm 05/20/22 1500   Tunneling Clock Position of Wound 12 05/20/22 1500   Undermining (cm) 0 cm 05/20/22 1500   Wound Odor None 05/20/22 1500   Exposed Structures Fascia 05/20/22 1500   Number of days: 39       Wound 04/22/22 Pressure Injury Sacrum POA stage 4 (Active)   Wound Image    05/20/22 1500   Site Assessment Red 05/20/22 1500   Periwound Assessment Scar tissue 05/20/22 1500   Margins Unattached edges;Epibole (rolled edges) 05/20/22 1500   Drainage Amount Large 05/20/22 1500   Drainage Description Serosanguineous 05/20/22 1500   Treatments Cleansed;Provider debridement 05/20/22 1500   Wound Cleansing Normal Saline Irrigation 05/20/22 1500   Periwound Protectant Skin Protectant Wipes to Periwound;Benzoin;Paste Ring;Drape 05/20/22 1500   Dressing Cleansing/Solutions Not Applicable 05/20/22 1500   Dressing Options Wound Vac;Mepilex 05/20/22 1500   Dressing Changed New 05/20/22 1500   Dressing Change/Treatment Frequency Monday, Wednesday, Friday, and As Needed 05/20/22 1500   Pressure Injury Stage 4 05/20/22 1500   Wound Length (cm) 3 cm 05/20/22 1500   Wound Width (cm) 2 cm 05/20/22 1500   Wound Depth (cm) 2.7 cm 05/20/22 1500   Wound Surface Area (cm^2) 6 cm^2 05/20/22 1500   Wound Volume (cm^3) 16.2 cm^3 05/20/22 1500   Post-Procedure Length (cm) 3 cm 05/20/22 1500    Post-Procedure Width (cm) 2 cm 05/20/22 1500   Post-Procedure Depth (cm) 2.8 cm 05/20/22 1500   Post-Procedure Surface Area (cm^2) 6 cm^2 05/20/22 1500   Post-Procedure Volume (cm^3) 16.8 cm^3 05/20/22 1500   Wound Healing % -575 05/20/22 1500   Tunneling (cm) 0 cm 05/20/22 1500   Tunneling Clock Position of Wound 12 05/03/22 1600   Undermining (cm) 3.1 cm 05/20/22 1500   Undermining of Wound, 1st Location From 9 o'clock;To 5 o'clock 05/20/22 1500   Wound Odor None 05/20/22 1500   Exposed Structures KEN 05/20/22 1500   Number of days: 28       Wound 05/12/22 Pressure Injury Leg Lower;Posterior Left stage 3 (Active)   Wound Image   05/20/22 1500   Site Assessment Red 05/20/22 1500   Periwound Assessment Scar tissue;Fragile 05/20/22 1500   Margins Attached edges 05/20/22 1500   Drainage Amount Scant 05/20/22 1500   Drainage Description Serosanguineous 05/20/22 1500   Treatments Cleansed;Site care 05/20/22 1500   Wound Cleansing Normal Saline Irrigation 05/20/22 1500   Periwound Protectant Skin Protectant Wipes to Periwound;Barrier Paste 05/20/22 1500   Dressing Cleansing/Solutions Not Applicable 05/20/22 1500   Dressing Options Hydrofiber Silver;Mepilex;Tubigrip 05/20/22 1500   Dressing Changed New 05/20/22 1500   Dressing Status Clean;Dry;Intact 05/12/22 1000   Dressing Change/Treatment Frequency Every 72 hrs, and As Needed 05/20/22 1500   Pressure Injury Stage 3 05/20/22 1500   Non-staged Wound Description Not applicable 05/12/22 1000   Wound Length (cm) 0.8 cm 05/20/22 1500   Wound Width (cm) 0.5 cm 05/20/22 1500   Wound Depth (cm) 0.1 cm 05/20/22 1500   Wound Surface Area (cm^2) 0.4 cm^2 05/20/22 1500   Wound Volume (cm^3) 0.04 cm^3 05/20/22 1500   Wound Healing % 20 05/20/22 1500   Tunneling (cm) 0 cm 05/20/22 1500   Undermining (cm) 0 cm 05/20/22 1500   Wound Odor None 05/20/22 1500   Exposed Structures None 05/20/22 1500   Number of days: 8       Wound 05/20/22 Pressure Injury Right Lower Back (Active)    Wound Image   05/20/22 1500   Site Assessment Purple 05/20/22 1500   Periwound Assessment Scar tissue;Fragile 05/20/22 1500   Drainage Amount None 05/20/22 1500   Treatments Cleansed;Site care 05/20/22 1500   Wound Cleansing Normal Saline Irrigation 05/20/22 1500   Periwound Protectant Skin Protectant Wipes to Periwound 05/20/22 1500   Dressing Cleansing/Solutions Not Applicable 05/20/22 1500   Dressing Options Mepilex 05/20/22 1500   Dressing Changed New 05/20/22 1500   Dressing Change/Treatment Frequency Monday, Wednesday, Friday, and As Needed 05/20/22 1500   Pressure Injury Stage DTPI 05/20/22 1500   Wound Odor None 05/20/22 1500   Exposed Structures None 05/20/22 1500   Number of days: 0         PROCEDURE: Excisional debridement of sacrococcygeal pressure injury  -2% viscous lidocaine applied topically to wound bed for approximately 5 minutes prior to debridement  -Curette  used to debride wound bed and open closed wound edges.  Excisional debridement was performed to remove devitalized tissue until healthy, bleeding tissue was visualized.  Total area debrided approximately 10 cm², including into wound tracts tissue debrided into the muscle layer.    -Bleeding controlled with manual pressure.    -Wound care completed by wound RN, refer to flowsheet  -Patient tolerated the procedure well, without c/o pain or discomfort.       PROCEDURE: Excisional debridement of left medial lower leg pressure injury, and application of biologic  -2% viscous lidocaine applied topically to wound bed for approximately 5 minutes prior to debridement  -Curette used to debride wound bed.  Excisional debridement was performed to remove devitalized tissue until healthy, bleeding tissue was visualized.  Total area debrided approximately 7.0 cm², including into wound undermining.  Deepest level of debridement was into the muscle layer.  -Bleeding controlled with manual pressure.    -Wound irrigated thoroughly with normal saline  -A 2 x  3 cm piece of epi cord was hydrated with normal saline and applied to the wound bed.  No wastage of product  -Wound care completed by wound RN, refer to flowsheet  -Patient tolerated the procedure well, without c/o pain or discomfort.     No need for debridement of left posterior lower leg wound.      BIOLOGIC LOG  5/20/2022-first application.  PRODUCT: EpiCord 2 x 3 cm . PRODUCT #[id number]; EXPIRES: 2026-12-01    Pertinent Labs and Diagnostics:    Labs:     A1c: No results found for: HBA1C       IMAGING: No results found.    VASCULAR STUDIES: No results found.    LAST  WOUND CULTURE:   Lab Results   Component Value Date/Time    CULTRSULT - 05/19/2022 11:00 AM          ASSESSMENT AND PLAN:     1. Sacral decubitus ulcer, stage IV (HCC)  Comments: Ulcer first noted in early April 2022 as small open area which quickly enlarged.  This ulcer is present distal from previous sacral ulcer which healed after surgery in January.  Patient has history of flap reconstruction x2 to this area.    5/20/2022: Significantly less slough to wound bed.  Undermining persists.  -Excisional debridement of wound in clinic today, medically necessary to promote wound healing.  -Patient to return to clinic weekly for assessment and debridement  -VAC initiated in clinic today  -Home health to change VAC dressing 2 times per week, on Monday and Wednesday, in between clinic visits.  Patient will return to clinic weekly for assessment, debridement, and VAC dressing change  -Plastics referral sent on previous visit.  Referral information was sent to Dr. Chaves's office.  Patient states he has not heard from them yet.   -Consider MRI to assess for OM if wound does not progress adequately    Wound care: NPWT at 125 mmHg to accelerate granulation, change 3 times per week.      2.  Postoperative wound dehiscence, subsequent encounter  Comments: On 4/26 patient underwent irrigation and debridement of multiple compartments of the left lower  extremity states for pressure injury, with bone excision, and complex closure of chronic wound using biologic skin substitute.  During postop visit in surgeons office on 5/11, surgical site was noted to be dehisced.  Patient was referred to wound clinic for management.    5/20/2022: Depth of undermining has decreased slightly since last assessment.  Authorization to continue with epi cord on this wound received from patient's insurance provider.  -Excisional debridement of wound in clinic today, medically necessary to promote wound healing.  -Application of biologic, epicord.  Home health to leave undisturbed.  Will reapply in clinic next week  -Patient to return to clinic weekly for assessment, debridement, and additional biologic application if needed  -Home health to change outer dressing only as needed for saturation in between clinic visits    Wound care: Epi cord biologic to accelerate granulation, Adaptic to keep biologic moist, Steri-Strips to secure, plain Hydrofiber, foam cover dressing    3.  Pressure injury of left leg, stage III  Comments: New pressure injury to left posterior lower leg noted first observed in clinic on 5/12/2022.  Located within scar tissue.    5/20/2022: Wound appears to be shallow, no need for debridement today  -No debridement    Wound care: Silver Hydrofiber to manage exudate and bioburden, foam cover dressing, Hypafix tape      4.  Pressure injury of deep tissue of right buttock  Comments: First observed in clinic 5/20/2022 5/20/2022: Area of linear discoloration, skin intact.  Appears to be deep tissue injury due to pressure  -No need for debridement today.    -Monitor each clinic visit  -Patient informed so that he knows to be aware during repositioning.    5. Quadriplegia, C5-C7, complete (HCC)  Comments: Complicating factor.  Impaired mobility and sensation.    5/20/2022: Patient states he is up in his wheelchair approximately 7 hours/day.  He repositions himself every 1/2  hour using mechanical features of his chair.  -Offloading strategies reviewed in clinic.  Patient has low-air-loss bed, and appropriate pressure relief cushion in his motorized wheelchair    PATIENT EDUCATION  -Etiology of pressure injury  -Importance of offloading and frequent repositioning  -Strategies for offloading in bed and when seated discussed and demonstrated  - Importance of adequate nutrition for wound healing  - Advised to go to ER for any increased redness, swelling, drainage or odor, or if patient develops fever, chills, nausea or vomiting.    My total time spent caring for the patient on the day of the encounter was 30 minutes.   This does not include time spent on separately billable procedures/tests.      Please note that this note may have been created using voice recognition software. I have worked with technical experts from [employer] [employer] to optimize the interface.  I have made every reasonable attempt to correct obvious errors, but there may be errors of grammar and possibly content that I did not discover before finalizing the note.

## 2022-05-20 NOTE — PATIENT INSTRUCTIONS
-Keep your wound dressing clean, dry, and intact.    -Change your dressing if it becomes soiled, soaked, or falls off.    -Wound vac may not have any drainage in tube or cannister & it will still be working.   Change cannister if it does become full by pressing tab on side of machine to remove canister and snap on new one. Full canister can be thrown in the trash. If cannister fills with bright red blood - go to ER. Dressing will be changed every MWF at the wound clinic.  If you are having issues with your wound VAC, please consider patching leaks, changing the canister, or calling [phone] for troubleshooting. If the wound VAC has been off or un-operational for over 2 hours, call wound care center to inform them and remove all dressings including black foam and replace with normal saline damp gauze.     -Should you experience any significant changes in your wound(s), such as infection (redness, swelling, localized heat, increased pain, fever > 101 F, chills) or have any questions regarding your home care instructions, please contact the wound center at [phone]. If after hours, contact your primary care physician or go to the hospital emergency room.

## 2022-05-20 NOTE — PROGRESS NOTES
Home health orders faxed to [facility]. Patient was unable to fit the [employer] box in his taxi. Patient given black box and 1 canister and dressing change kit to take home. Patients family to pick-up box early next week.

## 2022-05-25 NOTE — PROGRESS NOTES
Device transmission reviewed. Device demonstrated appropriate function.       Electronically Signed by: Lex Skinner M.D.    5/25/2022  8:13 AM

## 2022-05-27 ENCOUNTER — OFFICE VISIT (OUTPATIENT)
Dept: WOUND CARE | Facility: MEDICAL CENTER | Age: 72
End: 2022-05-27
Attending: INTERNAL MEDICINE
Payer: MEDICARE

## 2022-05-27 VITALS
HEART RATE: 63 BPM | DIASTOLIC BLOOD PRESSURE: 100 MMHG | TEMPERATURE: 97.9 F | RESPIRATION RATE: 18 BRPM | SYSTOLIC BLOOD PRESSURE: 180 MMHG | OXYGEN SATURATION: 96 %

## 2022-05-27 DIAGNOSIS — L89.893 PRESSURE INJURY OF LEFT LEG, STAGE 3 (HCC): ICD-10-CM

## 2022-05-27 DIAGNOSIS — L89.316 PRESSURE INJURY OF DEEP TISSUE OF RIGHT BUTTOCK: ICD-10-CM

## 2022-05-27 DIAGNOSIS — L89.154 SACRAL DECUBITUS ULCER, STAGE IV (HCC): ICD-10-CM

## 2022-05-27 DIAGNOSIS — G82.53 QUADRIPLEGIA, C5-C7, COMPLETE (HCC): Primary | ICD-10-CM

## 2022-05-27 DIAGNOSIS — T81.31XD POSTOPERATIVE WOUND DEHISCENCE, SUBSEQUENT ENCOUNTER: ICD-10-CM

## 2022-05-27 PROCEDURE — 11043 DBRDMT MUSC&/FSCA 1ST 20/<: CPT

## 2022-05-27 PROCEDURE — 97605 NEG PRS WND THER DME<=50SQCM: CPT

## 2022-05-27 PROCEDURE — 15271 SKIN SUB GRAFT TRNK/ARM/LEG: CPT

## 2022-05-27 PROCEDURE — 99215 OFFICE O/P EST HI 40 MIN: CPT

## 2022-05-27 PROCEDURE — 15271 SKIN SUB GRAFT TRNK/ARM/LEG: CPT | Performed by: NURSE PRACTITIONER

## 2022-05-27 ASSESSMENT — ENCOUNTER SYMPTOMS
NAUSEA: 0
CONSTIPATION: 0
VOMITING: 0
ROS GI COMMENTS: COLOSTOMY IN PLACE
DIARRHEA: 0
SHORTNESS OF BREATH: 0
CHILLS: 0
FEVER: 0
COUGH: 0

## 2022-05-27 NOTE — [facility]
Infectious Disease Progress Note    Author: Rosmery Crabtree M.D. Date & Time of service: 8/21/2019  1:19 PM    Chief Complaint:  Sacral decubitus  Sacral osteomyelitis     Interval History:  69 y.o. WM with  C5- 7 complete quadriplegia admitted 7/24/2019 from [facility] on 7/24/2019 for wound check on his coccyx. Large decub to bone  7/26 AF WBC 5.9  seen with wound care-no surrounding cellulitis. Planned for CT and biopsy today  7/27 AF n IR declined to biopsy sacral bone. No fever or chills-had colostomy done this am  7/28 afebrile WBC 7.1 patient transferred to [facility] after colostomy creation yesterday.  He denies any abdominal pain.  Tolerating IV antibiotics.  7/30- AF, WBC 5.8, no issue with antibiotics, denies any pain, reports plastic surgeon wanting to hold off on flap at this time.  7/31-AF, WBC 5.9, tolerating antibiotics, WV in place to sacrum, denies any pain, agreeable to PICC placement for need of IV antibiotics x6 weeks.  8/9/2019 no fevers.  Creatinine is 0.59 Denies any pain or any new issues.  8/10/2019-no fevers.  No new issues overnight.  8/13/2019-no fevers.  Tolerating antibiotics without any issues.  8/16 AF WBC 4.8 ate all of breakfast-feels pretty good. Denies SE abx and pain controlled. Plan for surgery next week noted  8/19 AF sleeping soundly at time of rounds-NAD  8/20/2019 AF WBC 4.8 up to chair-no problems with PICC ir abx-pain controlled. Plan for OR tomorrow  8/21 AF plan for OR today-no new complaints  Review of Systems:  Review of Systems   Constitutional: Negative for fever.   Respiratory: Negative for cough and shortness of breath.    Cardiovascular: Negative for chest pain.   Gastrointestinal: Negative for nausea and vomiting.   Skin: Negative for rash.   Neurological: Positive for focal weakness.   All other systems reviewed and are negative.      Hemodynamics:  T97.5 /90 P76 RR 18 O2 sat 97%  Physical Exam:  Physical Exam   Constitutional: He  is oriented to person, place, and time. No distress.   HENT:   Head: Normocephalic and atraumatic.   Mouth/Throat: No oropharyngeal exudate.   Eyes: Conjunctivae are normal. No scleral icterus.   Neck: Neck supple. No JVD present.   Cardiovascular: Normal rate.   Pulmonary/Chest: No stridor. No respiratory distress. He has no wheezes. He has no rales.   Abdominal: Soft. He exhibits no distension. There is no tenderness.   Colostomy present   Musculoskeletal: He exhibits no edema.   RUE PICC nontender   Neurological: He is alert and oriented to person, place, and time. No cranial nerve deficit.   Paraplegia  sarcopenia   Skin: Skin is warm. He is not diaphoretic.   Large sacral ulcer, 5.5 X 2.2 X 3.2 cm-95% granulation at last check  ecchymosis   Psychiatric: He has a normal mood and affect. His behavior is normal.   Vitals reviewed.        Labs:  Recent Labs     08/19/19  0313   WBC 4.8   RBC 3.88*   HEMOGLOBIN 10.3*   HEMATOCRIT 33.6*   MCV 86.6   MCH 26.5*   RDW 72.7*   PLATELETCT 163*   MPV 8.8*     Recent Labs     08/19/19  0313   SODIUM 142   POTASSIUM 4.0   CHLORIDE 109   CO2 26   GLUCOSE 91   BUN 12     Recent Labs     08/19/19  0313   ALBUMIN 2.8*   TBILIRUBIN 0.4   ALKPHOSPHAT 60   TOTPROTEIN 5.8*   ALTSGPT 14   ASTSGOT 16   CREATININE 0.51       Imaging:  .    Dx-chest-portable (1 View)    Result Date: 7/24/2019 7/24/2019 11:57 AM HISTORY/REASON FOR EXAM:  Sepsis protocol. Atrial fibrillation. Hypertension. TECHNIQUE/EXAM DESCRIPTION AND NUMBER OF VIEWS: Single portable view of the chest. COMPARISON: None available. FINDINGS: The mediastinal and cardiac silhouette is enlarged. The pulmonary vascularity is within normal limits. The lung parenchyma is clear. There is no significant pleural effusion. There is no visible pneumothorax. There are postsurgical changes in the cervical thoracic spine. There are right lateral rib fractures, probably old.     1.  There is no acute cardiopulmonary process. 2.  There  is an enlarged cardiac silhouette.      Micro:  Results     ** No results found for the last 168 hours. **        Assessment/Plan:  Sacral decubitis, Stage IV, on treatment   Complicated by underlying sacral osteomyelitis, on treatment  Fluid collection, concern for abscess base of penis, on treatment  Afebrile  No leukocytosis  Wound culture polymicrobial (MRSA, Bfrag, Strep Constellatus/Milleri)  Blood cxs on 7/24 negative  CT on 7/26 w/ 3.8 cm deep decub ulcer extending to bone, presacral edema, 2 cm fluid collection at base of penis may be abscess  Refused MRI-OSH   XRAY + OM by report  s/p diverting colostomy on 7/27 by Dr. Hannah  IR unable to do a bone biopsy due to extent of wound  OR for flap today  Continue IV Vancomycin, ceftriaxone, flagyl (no Unasyn due to shortage)  Monitor renal function at least 3 times weekly and Vanco troughs  Continue IV antibiotics for 6 weeks-follow with PO if indicated  Stop date IV abx 9/4/19  Check BMP    Cervical quad  Impediment to offloading and healing                  good balance

## 2022-05-27 NOTE — PATIENT INSTRUCTIONS
-Keep your wound dressing clean, dry, and intact.    -Change your dressing if it becomes soiled, soaked, or falls off.    -Wound vac may not have any drainage in tube or cannister & it will still be working.   Change cannister if it does become full by pressing tab on side of machine to remove canister and snap on new one. Full canister can be thrown in the trash. If cannister fills with bright red blood - go to ER. Dressing will be changed every MWF at the wound clinic.  If you are having issues with your wound VAC, please consider patching leaks, changing the canister, or calling [phone] for troubleshooting. If the wound VAC has been off or un-operational for over 2 hours, call wound care center to inform them and remove all dressings including black foam and replace with normal saline damp gauze.     -Should you experience any significant changes in your wound(s), such as infection (redness, swelling, localized heat, increased pain, fever > 101 F, chills) or have any questions regarding your home care instructions, please contact the wound center at [phone]. If after hours, contact your primary care physician or go to the hospital emergency room.

## 2022-05-28 NOTE — PROGRESS NOTES
Provider Encounter- Pressure Injury        HISTORY OF PRESENT ILLNESS  Wound History:    START OF CARE IN CLINIC: 5/3/2022    REFERRING PROVIDER: Sahara Mendez      WOUNDS- Pressure Injury, Sacrococcygeal, stage IV                                                             Surgical wound dehiscence -status post surgical I&D of stage IV pressure injury and         biologic application                      Pressure injury, stage III, left posterior lower leg-first observed in clinic 5/12/2022                                 Pressure injury, DTI, right buttock/lower back-first observed in clinic 5/20/2022     HISTORY: Patient with history of incomplete quadriplegia referred to [facility] for treatment of a stage IV pressure injury.  He has a history of previous pressure injuries to this area, and underwent muscle flaps in 2019, and then again in 2020.  He was seen in the wound clinic in November 2021 for an ulcer proximal from his current ulcer, and pressure injuries to his left posterior lower leg and left heel.  At that time, it was discovered that the patient had retained VAC foam embedded in the wound bed of the sacral wound.  Attempts were made to get him back to his plastic surgeon, though unsuccessful.  In January he underwent surgical removal of VAC sponge along with excisional debridement of his sacral wound by Dr. Chaves.  After the surgery, his wound went on to heal without incident.   In early April 2022, his home health nurse noted a new sacral ulcer, below the previous ulcer which quickly tripled in size over the following weeks.  The ulcer to his left medial lower leg had also deteriorated, with bone visible at the base..  He was hospitalized from 4/22 until 4/27/2022 and underwent surgery with Dr. Raman on 4/26 for irrigation and debridement of multiple compartments of the left lower extremity, bone excision, and complex closure of chronic wound using biologic skin substitute.   His sacrococcygeal wound was  not surgically addressed during this admission.  He was discharged back to his group home, with home health, and referral to outpatient wound clinic for his sacral wound.  He was instructed to follow-up with his surgeon for his lower leg wound.       Postoperatively, the left medial lower leg incision dehisced.  He was seen by his surgeon at [facility] on 5/11.  The surgeon opted to leave remaining sutures in place, and refer him to the wound clinic for treatment of this wound.   Treatment of this wound was initiated in clinic on 5/12.  During this visit was also noted that his heel DTI had resolved, but that he had a new pressure injury to his left posterior lower extremity.     A new pressure injury was noted to patient's right upper buttock/lower back on 5/20/2022.  Wound was linear in shape, skin discolored but intact.    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  Never used smokeless tobacco    Patient's problem list, allergies, and current medications reviewed and updated in Epic    Interval History:  5/3/2022 : Clinic visit with ADILSON Arthur, FNPEGGY-BC, CWDINESHN, CFCN.  Patient states he is feeling well overall, denies fevers, chills, nausea, vomiting, cough or shortness of breath.  During his initial assessment, a piece of fenestrated tubing, approximately 6 inches long, 3 to 5 mm in diameter, was identified and removed from his sacral wound.  Patient informed me that after his flap procedure in 2020 it was believed that part of a drain tube was retained within his wound.  The surgeon at the time decided not to explore the wound.   The surgical dressing was removed from his left lower  leg wound.  Steri-Strips in place over biologic.  Patient informed me that he has a follow-up appointment with Dr. Raman a week from tomorrow (5/11).  Patient is under the impression that Dr. Raman will be managing this wound.      5/12/2022 : Clinic visit with ADILSON Arthur, HOLDEN, IMAN, LILIYA.  Anjum states he is feeling very well.  He was seen by Dr. Raman yesterday who was concerned with the appearance of his lower leg wound.  Per Dr. Raman's note, sutures to be left in place for another week.  No residual biologic product noted within wound bed.  We will request authorization to continue.   Patient sacral wound is with significantly less slough than last week.  Discussed with patient treating with VAC.  Given his past history of retained foam, expected him to be resistant to the idea, however he stated he was willing to do what ever necessary to expedite healing of this wound.   Patient is up in his wheelchair 7 to 8 hours/day, has appropriate pressure relief cushion in his chair, and the ability to raise and lower back to his wheelchair mechanically.  He also knows to physically reposition himself every 1/2 hour.  He has a low-air-loss bed on his mattress, with alternating pressure.  He is very well versed on pressure-relieving techniques.    5/20/2022 : Clinic visit with ADILSON Arthur, HOLDEN, IMAN, LILIYA.  Anjum states he is feeling well today.  He understands that the VAC is to be initiated on his sacral wound today.  He verbalizes some worry about possibility of VAC retention, as this was a problem before.  I assured him that we would provide appropriate precautions, and would make sure that we write on the outside of the dressing number of pieces of foam used, and we would be communicating with home health.   We have also been approved for biologic to his left posterior lower leg wound and will be initiating this today.  Sutures also removed as recommended by Dr. Raman.  The smaller,  more superficial wound to his posterior lower leg appears to be crusting adequately    5/27/2022 : Clinic visit with ADILSON Arthur, FNP-BC, CWDINESHN, CFCN.  Brandon continues to feel well overall.  He has had no problems with the VAC to his sacrum over this past week.  We have received approval to apply VAC to his leg wound also, over the biologic.  Home health will continue to change the sacral VAC dressing 2 times per week in between clinic visits.  They will be instructed not to disturb the VAC and biologic to his leg.  This will be changed in clinic weekly.   The pressure injuries to his left posterior lower leg, and right lower back/upper buttock appear to be resolving.  No need to debride these today.      REVIEW OF SYSTEMS:   Review of Systems   Constitutional: Negative for chills and fever.   Respiratory: Negative for cough and shortness of breath.    Cardiovascular: Negative for chest pain.   Gastrointestinal: Negative for constipation, diarrhea, nausea and vomiting.        Colostomy in place   Genitourinary:        Suprapubic catheter    Musculoskeletal:        Incomplete quadriplegia       PHYSICAL EXAMINATION:   BP (!) 180/100 (BP Location: Left arm, Patient Position: Sitting) Comment: RN notified  Pulse 63   Temp 36.6 °C (97.9 °F)   Resp 18   SpO2 96%   Physical Exam  Constitutional:       Appearance: He is obese.   HENT:      Head: Normocephalic.   Eyes:      Pupils: Pupils are equal, round, and reactive to light.   Pulmonary:      Effort: Pulmonary effort is normal.   Abdominal:      Palpations: Abdomen is soft.   Skin:     Comments: Coccygeal pressure injury, stage IV     Neurological:      Mental Status: He is alert.         WOUND ASSESSMENT  Wound 04/11/22 Full Thickness Wound Leg Medial Left --Left Medial Lower Leg (Active)   Wound Image    05/27/22 1400   Site Assessment Red;Pink;Yellow 05/27/22 1400   Periwound Assessment Edema;Fragile 05/27/22 1400   Margins Unattached edges 05/27/22 1400    Drainage Amount Large 05/27/22 1400   Drainage Description Serosanguineous 05/27/22 1400   Treatments Cleansed;Provider debridement 05/27/22 1400   Wound Cleansing Normal Saline Irrigation 05/27/22 1400   Periwound Protectant Skin Protectant Wipes to Periwound;Benzoin;Paste Ring;Drape 05/27/22 1400   Dressing Cleansing/Solutions Normal Saline 05/27/22 1400   Dressing Options Biologic (Skin Substitute);Adaptic;Wound Vac;Mepilex;Tubigrip 05/27/22 1400   Dressing Changed New 05/20/22 1500   Dressing Status Clean;Dry;Intact 04/11/22 1330   Dressing Change/Treatment Frequency Monday, Wednesday, Friday, and As Needed 05/27/22 1400   Non-staged Wound Description Full thickness 05/27/22 1400   Wound Length (cm) 4.4 cm 05/27/22 1400   Wound Width (cm) 1.7 cm 05/27/22 1400   Wound Depth (cm) 1.6 cm 05/27/22 1400   Wound Surface Area (cm^2) 7.48 cm^2 05/27/22 1400   Wound Volume (cm^3) 11.968 cm^3 05/27/22 1400   Post-Procedure Length (cm) 5 cm 05/27/22 1400   Post-Procedure Width (cm) 1.8 cm 05/27/22 1400   Post-Procedure Depth (cm) 2 cm 05/27/22 1400   Post-Procedure Surface Area (cm^2) 9 cm^2 05/27/22 1400   Post-Procedure Volume (cm^3) 18 cm^3 05/27/22 1400   Wound Healing % -26744 05/27/22 1400   Tunneling (cm) 0 cm 05/27/22 1400   Tunneling Clock Position of Wound 12 05/20/22 1500   Undermining (cm) 1.6 cm 05/27/22 1400   Undermining of Wound, 1st Location From 2 o'clock;To 4 o'clock 05/27/22 1400   Wound Odor None 05/27/22 1400   Exposed Structures Fascia 05/27/22 1400   Number of days: 46       Wound 04/22/22 Pressure Injury Sacrum POA stage 4 (Active)   Wound Image    05/27/22 1400   Site Assessment Red 05/27/22 1400   Periwound Assessment Scar tissue 05/27/22 1400   Margins Unattached edges;Epibole (rolled edges) 05/27/22 1400   Drainage Amount Large 05/27/22 1400   Drainage Description Serosanguineous 05/27/22 1400   Treatments Cleansed;Provider debridement 05/27/22 1400   Wound Cleansing Normal Saline Irrigation  05/27/22 1400   Periwound Protectant Skin Protectant Wipes to Periwound;Benzoin;Paste Ring;Drape 05/27/22 1400   Dressing Cleansing/Solutions Not Applicable 05/27/22 1400   Dressing Options Wound Vac;Mepilex 05/27/22 1400   Dressing Changed Changed 05/27/22 1400   Dressing Change/Treatment Frequency Monday, Wednesday, Friday, and As Needed 05/27/22 1400   Pressure Injury Stage 4 05/27/22 1400   Wound Length (cm) 2.8 cm 05/27/22 1400   Wound Width (cm) 1.8 cm 05/27/22 1400   Wound Depth (cm) 2 cm 05/27/22 1400   Wound Surface Area (cm^2) 5.04 cm^2 05/27/22 1400   Wound Volume (cm^3) 10.08 cm^3 05/27/22 1400   Post-Procedure Length (cm) 2.8 cm 05/27/22 1400   Post-Procedure Width (cm) 1.8 cm 05/27/22 1400   Post-Procedure Depth (cm) 2.1 cm 05/27/22 1400   Post-Procedure Surface Area (cm^2) 5.04 cm^2 05/27/22 1400   Post-Procedure Volume (cm^3) 10.584 cm^3 05/27/22 1400   Wound Healing % -320 05/27/22 1400   Tunneling (cm) 0 cm 05/27/22 1400   Tunneling Clock Position of Wound 12 05/03/22 1600   Undermining (cm) 3.6 cm 05/27/22 1400   Undermining of Wound, 1st Location From 9 o'clock;To 5 o'clock 05/27/22 1400   Wound Odor None 05/27/22 1400   Exposed Structures KEN 05/27/22 1400   Number of days: 35       Wound 05/12/22 Pressure Injury Leg Lower;Posterior Left stage 3 (Active)   Wound Image   05/27/22 1400   Site Assessment Brown 05/27/22 1400   Periwound Assessment Scar tissue;Fragile 05/27/22 1400   Margins Attached edges 05/20/22 1500   Drainage Amount None 05/27/22 1400   Drainage Description Serosanguineous 05/20/22 1500   Treatments Cleansed;Site care 05/27/22 1400   Wound Cleansing Normal Saline Irrigation 05/27/22 1400   Periwound Protectant Skin Protectant Wipes to Periwound 05/27/22 1400   Dressing Cleansing/Solutions Not Applicable 05/27/22 1400   Dressing Options Mepilex 05/27/22 1400   Dressing Changed New 05/27/22 1400   Dressing Status Clean;Dry;Intact 05/12/22 1000   Dressing Change/Treatment Frequency  Every 72 hrs, and As Needed 05/27/22 1400   Pressure Injury Stage 3 05/27/22 1400   Non-staged Wound Description Not applicable 05/12/22 1000   Wound Length (cm) 0.8 cm 05/20/22 1500   Wound Width (cm) 0.5 cm 05/20/22 1500   Wound Depth (cm) 0.1 cm 05/20/22 1500   Wound Surface Area (cm^2) 0.4 cm^2 05/20/22 1500   Wound Volume (cm^3) 0.04 cm^3 05/20/22 1500   Wound Healing % 20 05/20/22 1500   Tunneling (cm) 0 cm 05/20/22 1500   Undermining (cm) 0 cm 05/20/22 1500   Wound Odor None 05/27/22 1400   Exposed Structures None 05/27/22 1400   Number of days: 15       Wound 05/20/22 Pressure Injury Right Lower Back (Active)   Wound Image   05/27/22 1400   Site Assessment Purple 05/27/22 1400   Periwound Assessment Scar tissue;Fragile 05/27/22 1400   Drainage Amount None 05/27/22 1400   Treatments Cleansed;Site care 05/27/22 1400   Wound Cleansing Normal Saline Irrigation 05/27/22 1400   Periwound Protectant Skin Protectant Wipes to Periwound 05/27/22 1400   Dressing Cleansing/Solutions Not Applicable 05/27/22 1400   Dressing Options Mepilex 05/27/22 1400   Dressing Changed Changed 05/27/22 1400   Dressing Change/Treatment Frequency Monday, Wednesday, Friday, and As Needed 05/27/22 1400   Pressure Injury Stage DTPI 05/27/22 1400   Wound Odor None 05/27/22 1400   Exposed Structures None 05/27/22 1400   Number of days: 7         PROCEDURE: Excisional debridement of sacrococcygeal pressure injury  -2% viscous lidocaine applied topically to wound bed for approximately 5 minutes prior to debridement  -Curette  used to debride wound bed and open closed wound edges.  Excisional debridement was performed to remove devitalized tissue until healthy, bleeding tissue was visualized.  Total area debrided approximately 10 cm², including into wound tracts tissue debrided into the muscle layer.    -Bleeding controlled with manual pressure.    -Wound care completed by wound RN, refer to flowsheet  -Patient tolerated the procedure well,  without c/o pain or discomfort.       PROCEDURE: Excisional debridement of left medial lower leg full-thickness wound, and application of biologic  -2% viscous lidocaine applied topically to wound bed for approximately 5 minutes prior to debridement  -Using a scalpel, I created a 3 cm incision into the skin and subcutaneous tissue to open wound tract from superior wound edge, in order to facilitate wound care.  -Curette used to debride wound bed.  Excisional debridement was performed to remove devitalized tissue until healthy, bleeding tissue was visualized.  Total area debrided 9.0 cm².  Deepest level of debridement was into the muscle layer.  -Bleeding controlled with manual pressure.    -Wound irrigated thoroughly with normal saline  -A 2 x 3 cm piece of epi cord was hydrated with normal saline and applied to the wound bed.  No wastage of product  -Wound care completed by wound RN, refer to flowsheet  -Patient tolerated the procedure well, without c/o pain or discomfort.     No need for debridement of left posterior lower leg wound.      BIOLOGIC LOG  5/20/2022-first application.  PRODUCT: EpiCord 2 x 3 cm . PRODUCT #[id number]; EXPIRES: 2026-12-01 5/27/2022- second application.  PRODUCT: EpiCordEX 2 x 3 cm . PRODUCT #[id number]; EXPIRES: 2026-09-01    Pertinent Labs and Diagnostics:    Labs:     A1c: No results found for: HBA1C       IMAGING: No results found.    VASCULAR STUDIES: No results found.    LAST  WOUND CULTURE:   Lab Results   Component Value Date/Time    CULTRSULT - (A) 05/19/2022 11:00 AM    CULTRSULT Candida tropicalis  10-50,000 cfu/mL   (A) 05/19/2022 11:00 AM          ASSESSMENT AND PLAN:     1. Sacral decubitus ulcer, stage IV (Former[facility])  Comments: Ulcer first noted in early April 2022 as small open area which quickly enlarged.  This ulcer is present distal from previous sacral ulcer which healed after surgery in January.  Patient has history of flap reconstruction x2 to this  area.    5/27/2022: Significantly less slough to wound bed.  Total wound area has decreased slightly since last assessment.  No problems with VAC  -Excisional debridement of wound in clinic today, medically necessary to promote wound healing.  -Patient to return to clinic weekly for assessment, debridement, and VAC dressing change  -Home health to change VAC dressing 2 times per week, on Monday and Wednesday, in between clinic visits.  Patient will return to clinic weekly for assessment, debridement, and VAC dressing change  -Plastics referral sent on previous visit.  Referral information was sent to Dr. Chaves's office.  Patient states he has not heard from them yet.   -Consider MRI to assess for OM if wound does not progress adequately    Wound care: NPWT at 125 mmHg to accelerate granulation, change 3 times per week.      2.  Postoperative wound dehiscence, subsequent encounter  Comments: On 4/26 patient underwent irrigation and debridement of multiple compartments of the left lower extremity states for pressure injury, with bone excision, and complex closure of chronic wound using biologic skin substitute.  During postop visit in surgeons office on 5/11, surgical site was noted to be dehisced.  Patient was referred to wound clinic for management.    5/29/2022: Wound area larger today, due to opening of superior wound tract.  This was done to facilitate wound care.  We have received authorization to apply NPWT to this wound, in addition to biologic.  -Excisional debridement of wound in clinic today, medically necessary to promote wound healing.  -Biologic application, Epicord expandable, to accelerate granulation  -Patient to return to clinic weekly for assessment, debridement, and additional biologic application and VAC dressing change  -Home health as not to disturb biologic or VAC dressing    Wound care: Epicord expandable biologic to accelerate granulation, Adaptic to keep biologic moist, NPWT as adjunctive  therapy      3.  Pressure injury of left leg, stage III  Comments: New pressure injury to left posterior lower leg noted first observed in clinic on 5/12/2022.  Located within scar tissue.    5/29/2022: Wound progressing, almost resolved, no need for debridement today  -No debridement    Wound care: Mepilex foam dressing      4.  Pressure injury of deep tissue of right buttock  Comments: First observed in clinic 5/20/2022 5/27/2022: Skin still intact, appears to be resolving  -No need for debridement today.    -Monitor each clinic visit    Wound care: Mepilex foam dressing    5. Quadriplegia, C5-C7, complete (HCC)  Comments: Complicating factor.  Impaired mobility and sensation.    5/27/2022: Patient states he is up in his wheelchair approximately 7 hours/day.  He repositions himself every 1/2 hour using mechanical features of his chair.  -Offloading strategies reviewed in clinic.  Patient has low-air-loss bed, and appropriate pressure relief cushion in his motorized wheelchair    PATIENT EDUCATION  -Etiology of pressure injury  -Importance of offloading and frequent repositioning  -Strategies for offloading in bed and when seated discussed and demonstrated  - Importance of adequate nutrition for wound healing  - Advised to go to ER for any increased redness, swelling, drainage or odor, or if patient develops fever, chills, nausea or vomiting.    My total time spent caring for the patient on the day of the encounter was 30 minutes.   This does not include time spent on separately billable procedures/tests.      Please note that this note may have been created using voice recognition software. I have worked with technical experts from [facility] to optimize the interface.  I have made every reasonable attempt to correct obvious errors, but there may be errors of grammar and possibly content that I did not discover before finalizing the note.

## 2022-06-03 ENCOUNTER — OFFICE VISIT (OUTPATIENT)
Dept: WOUND CARE | Facility: MEDICAL CENTER | Age: 72
End: 2022-06-03
Attending: INTERNAL MEDICINE
Payer: MEDICARE

## 2022-06-03 VITALS
HEART RATE: 65 BPM | TEMPERATURE: 97.6 F | OXYGEN SATURATION: 96 % | DIASTOLIC BLOOD PRESSURE: 93 MMHG | SYSTOLIC BLOOD PRESSURE: 150 MMHG | RESPIRATION RATE: 18 BRPM

## 2022-06-03 DIAGNOSIS — T81.31XD POSTOPERATIVE WOUND DEHISCENCE, SUBSEQUENT ENCOUNTER: ICD-10-CM

## 2022-06-03 DIAGNOSIS — L89.893 PRESSURE INJURY OF LEFT LEG, STAGE 3 (HCC): ICD-10-CM

## 2022-06-03 DIAGNOSIS — G82.53 QUADRIPLEGIA, C5-C7, COMPLETE (HCC): ICD-10-CM

## 2022-06-03 DIAGNOSIS — L89.154 SACRAL DECUBITUS ULCER, STAGE IV (HCC): ICD-10-CM

## 2022-06-03 DIAGNOSIS — L89.316 PRESSURE INJURY OF DEEP TISSUE OF RIGHT BUTTOCK: ICD-10-CM

## 2022-06-03 PROCEDURE — 11043 DBRDMT MUSC&/FSCA 1ST 20/<: CPT | Mod: XS

## 2022-06-03 PROCEDURE — 15271 SKIN SUB GRAFT TRNK/ARM/LEG: CPT | Performed by: NURSE PRACTITIONER

## 2022-06-03 PROCEDURE — 15275 SKIN SUB GRAFT FACE/NK/HF/G: CPT

## 2022-06-03 PROCEDURE — 97605 NEG PRS WND THER DME<=50SQCM: CPT | Mod: XU

## 2022-06-03 PROCEDURE — 11042 DBRDMT SUBQ TIS 1ST 20SQCM/<: CPT

## 2022-06-03 PROCEDURE — 11043 DBRDMT MUSC&/FSCA 1ST 20/<: CPT | Mod: 59 | Performed by: NURSE PRACTITIONER

## 2022-06-03 PROCEDURE — 99213 OFFICE O/P EST LOW 20 MIN: CPT | Mod: 25

## 2022-06-03 ASSESSMENT — ENCOUNTER SYMPTOMS
SHORTNESS OF BREATH: 0
CHILLS: 0
NAUSEA: 0
ROS GI COMMENTS: COLOSTOMY IN PLACE
CONSTIPATION: 0
VOMITING: 0
FEVER: 0
COUGH: 0
DIARRHEA: 0

## 2022-06-03 NOTE — PROGRESS NOTES
Provider Encounter- Pressure Injury        HISTORY OF PRESENT ILLNESS  Wound History:    START OF CARE IN CLINIC: 5/3/2022    REFERRING PROVIDER: Sahara Mendez      WOUNDS- Pressure Injury, Sacrococcygeal, stage IV                                                             Surgical wound dehiscence -status post surgical I&D of stage IV pressure injury and         biologic application                      Pressure injury, stage III, left posterior lower leg-first observed in clinic 5/12/2022; resolved 6/3/2022                                 Pressure injury, DTI, right buttock/lower back-first observed in clinic 5/20/2022     HISTORY: Patient with history of incomplete quadriplegia referred to [facility] for treatment of a stage IV pressure injury.  He has a history of previous pressure injuries to this area, and underwent muscle flaps in 2019, and then again in 2020.  He was seen in the wound clinic in November 2021 for an ulcer proximal from his current ulcer, and pressure injuries to his left posterior lower leg and left heel.  At that time, it was discovered that the patient had retained VAC foam embedded in the wound bed of the sacral wound.  Attempts were made to get him back to his plastic surgeon, though unsuccessful.  In January he underwent surgical removal of VAC sponge along with excisional debridement of his sacral wound by Dr. Chaves.  After the surgery, his wound went on to heal without incident.   In early April 2022, his home health nurse noted a new sacral ulcer, below the previous ulcer which quickly tripled in size over the following weeks.  The ulcer to his left medial lower leg had also deteriorated, with bone visible at the base..  He was hospitalized from 4/22 until 4/27/2022 and underwent surgery with Dr. Raman on 4/26 for irrigation and debridement of multiple compartments of the left lower extremity, bone excision, and complex closure of chronic wound using biologic skin substitute.   His  sacrococcygeal wound was not surgically addressed during this admission.  He was discharged back to his group home, with home health, and referral to outpatient wound clinic for his sacral wound.  He was instructed to follow-up with his surgeon for his lower leg wound.       Postoperatively, the left medial lower leg incision dehisced.  He was seen by his surgeon at [facility] on 5/11.  The surgeon opted to leave remaining sutures in place, and refer him to the wound clinic for treatment of this wound.   Treatment of this wound was initiated in clinic on 5/12.  During this visit was also noted that his heel DTI had resolved, but that he had a new pressure injury to his left posterior lower extremity.     A new pressure injury was noted to patient's right upper buttock/lower back on 5/20/2022.  Wound was linear in shape, skin discolored but intact.    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  Never used smokeless tobacco    Patient's problem list, allergies, and current medications reviewed and updated in Epic    Interval History:  5/3/2022 : Clinic visit with ADILSON Arthur, FNP-BC, CWDINESHN, CFCN.  Patient states he is feeling well overall, denies fevers, chills, nausea, vomiting, cough or shortness of breath.  During his initial assessment, a piece of fenestrated tubing, approximately 6 inches long, 3 to 5 mm in diameter, was identified and removed from his sacral wound.  Patient informed me that after his flap procedure in 2020 it was believed that part of a drain tube was retained within his wound.  The surgeon at the time decided not to explore the wound.   The surgical dressing was  removed from his left lower leg wound.  Steri-Strips in place over biologic.  Patient informed me that he has a follow-up appointment with Dr. Raman a week from tomorrow (5/11).  Patient is under the impression that Dr. Raman will be managing this wound.      5/12/2022 : Clinic visit with ADILSON Arthur, HOLDEN, IMAN, LILIYA.  Anjum states he is feeling very well.  He was seen by Dr. Raman yesterday who was concerned with the appearance of his lower leg wound.  Per Dr. Raman's note, sutures to be left in place for another week.  No residual biologic product noted within wound bed.  We will request authorization to continue.   Patient sacral wound is with significantly less slough than last week.  Discussed with patient treating with VAC.  Given his past history of retained foam, expected him to be resistant to the idea, however he stated he was willing to do what ever necessary to expedite healing of this wound.   Patient is up in his wheelchair 7 to 8 hours/day, has appropriate pressure relief cushion in his chair, and the ability to raise and lower back to his wheelchair mechanically.  He also knows to physically reposition himself every 1/2 hour.  He has a low-air-loss bed on his mattress, with alternating pressure.  He is very well versed on pressure-relieving techniques.    5/20/2022 : Clinic visit with ADILSON Arthur, HOLDEN, IMAN, LILIYA.  Anjum states he is feeling well today.  He understands that the VAC is to be initiated on his sacral wound today.  He verbalizes some worry about possibility of VAC retention, as this was a problem before.  I assured him that we would provide appropriate precautions, and would make sure that we write on the outside of the dressing number of pieces of foam used, and we would be communicating with home health.   We have also been approved for biologic to his left posterior lower leg wound and will be initiating this today.  Sutures also removed as recommended by  Dr. Raman.  The smaller, more superficial wound to his posterior lower leg appears to be crusting adequately    5/27/2022 : Clinic visit with ADILSON Arthur, HOLDEN, IMAN, LILIYA.  Brandon continues to feel well overall.  He has had no problems with the VAC to his sacrum over this past week.  We have received approval to apply VAC to his leg wound also, over the biologic.  Home health will continue to change the sacral VAC dressing 2 times per week in between clinic visits.  They will be instructed not to disturb the VAC and biologic to his leg.  This will be changed in clinic weekly.   The pressure injuries to his left posterior lower leg, and right lower back/upper buttock appear to be resolving.  No need to debride these today.    6/3/2022 : Clinic visit with ADILSON Arthur FNP-BC, IMAN, LILIYA.  Anjum states that he is feeling very well.  He was seen by Dr. Raman's PA earlier today.  VAC was not removed because patient was worried about VAC being off until he was seen in the clinic.  She did review his wound photos in epic.  Patient also informing that his home health nurses were worried about the appearance of his sacral wound last Friday, and cultured the wound, received order from patient's PCP, and sent culture to [facility].  Unfortunately, I do not have access to these results.  Wound did not appear to be infected, no appreciable odor, evidence of new granulation.  The pressure ulcer to his left posterior lower leg has resolved.  DTI to right upper buttock also appears to be resolving.   Patient states he has had problems with frequent notifications from his pump that canister is full, even when it is not.  He has been going through canisters every day or so.  I notified [facility] representative, Mae Mccullough, requesting new pump be delivered, and perhaps a few more canisters.      REVIEW OF SYSTEMS:   Review of Systems   Constitutional: Negative for chills and fever.   Respiratory: Negative for cough and  shortness of breath.    Cardiovascular: Negative for chest pain.   Gastrointestinal: Negative for constipation, diarrhea, nausea and vomiting.        Colostomy in place   Genitourinary:        Suprapubic catheter    Musculoskeletal:        Incomplete quadriplegia       PHYSICAL EXAMINATION:   BP (!) 150/93 (BP Location: Left arm, Patient Position: Sitting) Comment: RN notified  Pulse 65   Temp 36.4 °C (97.6 °F)   Resp 18   SpO2 96%   Physical Exam  Constitutional:       Appearance: He is obese.   HENT:      Head: Normocephalic.   Eyes:      Pupils: Pupils are equal, round, and reactive to light.   Pulmonary:      Effort: Pulmonary effort is normal.   Abdominal:      Palpations: Abdomen is soft.   Skin:     Comments: Coccygeal pressure injury, stage IV     Neurological:      Mental Status: He is alert.         WOUND ASSESSMENT  Wound 04/11/22 Full Thickness Wound Leg Medial Left --Left Medial Lower Leg (Active)   Wound Image   06/03/22 1300   Site Assessment Red;Pink;Yellow 06/03/22 1300   Periwound Assessment Edema;Fragile 06/03/22 1300   Margins Unattached edges 06/03/22 1300   Drainage Amount Large 06/03/22 1300   Drainage Description Serosanguineous 06/03/22 1300   Treatments Cleansed;Provider debridement;Site care 06/03/22 1300   Wound Cleansing Normal Saline Irrigation 06/03/22 1300   Periwound Protectant Skin Protectant Wipes to Periwound;Benzoin;Paste Ring;Drape 06/03/22 1300   Dressing Cleansing/Solutions Normal Saline 06/03/22 1300   Dressing Options Biologic (Skin Substitute);Adaptic;Wound Vac;Mepilex;Tubigrip 06/03/22 1300   Dressing Changed New 05/20/22 1500   Dressing Status Clean;Dry;Intact 04/11/22 1330   Dressing Change/Treatment Frequency Monday, Wednesday, Friday, and As Needed 05/27/22 1400   Non-staged Wound Description Full thickness 06/03/22 1300   Wound Length (cm) 4.1 cm 06/03/22 1300   Wound Width (cm) 1.6 cm 06/03/22 1300   Wound Depth (cm) 1.2 cm 06/03/22 1300   Wound Surface Area  (cm^2) 6.56 cm^2 06/03/22 1300   Wound Volume (cm^3) 7.872 cm^3 06/03/22 1300   Post-Procedure Length (cm) 5.1 cm 06/03/22 1300   Post-Procedure Width (cm) 1.6 cm 06/03/22 1300   Post-Procedure Depth (cm) 1.2 cm 06/03/22 1300   Post-Procedure Surface Area (cm^2) 8.16 cm^2 06/03/22 1300   Post-Procedure Volume (cm^3) 9.792 cm^3 06/03/22 1300   Wound Healing % -[id number] 06/03/22 1300   Tunneling (cm) 0 cm 06/03/22 1300   Tunneling Clock Position of Wound 12 05/20/22 1500   Undermining (cm) 0.8 cm 06/03/22 1300   Undermining of Wound, 1st Location From 2 o'clock;To 4 o'clock 06/03/22 1300   Wound Odor None 06/03/22 1300   Exposed Structures Fascia 06/03/22 1300   Number of days: 53       Wound 04/22/22 Pressure Injury Sacrum POA stage 4 (Active)   Wound Image    06/03/22 1300   Site Assessment Red 06/03/22 1300   Periwound Assessment Scar tissue 06/03/22 1300   Margins Unattached edges;Epibole (rolled edges) 06/03/22 1300   Drainage Amount Moderate 06/03/22 1300   Drainage Description Serosanguineous 06/03/22 1300   Treatments Cleansed;Provider debridement;Site care 06/03/22 1300   Wound Cleansing Puracyn Benton 06/03/22 1300   Periwound Protectant Skin Protectant Wipes to Periwound;Benzoin;Paste Ring;Drape 06/03/22 1300   Dressing Cleansing/Solutions Not Applicable 06/03/22 1300   Dressing Options Wound Vac;Mepilex 06/03/22 1300   Dressing Changed Changed 06/03/22 1300   Dressing Change/Treatment Frequency Monday, Wednesday, Friday, and As Needed 05/27/22 1400   Pressure Injury Stage 4 05/27/22 1400   Wound Length (cm) 3 cm 06/03/22 1300   Wound Width (cm) 1.5 cm 06/03/22 1300   Wound Depth (cm) 2 cm 06/03/22 1300   Wound Surface Area (cm^2) 4.5 cm^2 06/03/22 1300   Wound Volume (cm^3) 9 cm^3 06/03/22 1300   Post-Procedure Length (cm) 3 cm 06/03/22 1300   Post-Procedure Width (cm) 1.5 cm 06/03/22 1300   Post-Procedure Depth (cm) 2 cm 06/03/22 1300   Post-Procedure Surface Area (cm^2) 4.5 cm^2 06/03/22 1300    Post-Procedure Volume (cm^3) 9 cm^3 06/03/22 1300   Wound Healing % -275 06/03/22 1300   Tunneling (cm) 0 cm 06/03/22 1300   Tunneling Clock Position of Wound 12 05/03/22 1600   Undermining (cm) 3.1 cm 06/03/22 1300   Undermining of Wound, 1st Location From 9 o'clock;To 3 o'clock 06/03/22 1300   Wound Odor None 06/03/22 1300   Exposed Structures KEN 06/03/22 1300   Number of days: 42       Wound 05/12/22 Pressure Injury Leg Lower;Posterior Left stage 3 (Active)   Wound Image   06/03/22 1300   Site Assessment Dry;Brown 06/03/22 1300   Periwound Assessment Scar tissue;Fragile 06/03/22 1300   Margins Attached edges 05/20/22 1500   Drainage Amount None 06/03/22 1300   Drainage Description Serosanguineous 05/20/22 1500   Treatments Cleansed;Site care 06/03/22 1300   Wound Cleansing Puracyn Schurz 06/03/22 1300   Periwound Protectant Skin Protectant Wipes to Periwound 06/03/22 1300   Dressing Cleansing/Solutions Not Applicable 06/03/22 1300   Dressing Options Mepilex 06/03/22 1300   Dressing Changed Changed 06/03/22 1300   Dressing Status Clean;Dry;Intact 05/12/22 1000   Dressing Change/Treatment Frequency Every 72 hrs, and As Needed 05/27/22 1400   Pressure Injury Stage 3 05/27/22 1400   Non-staged Wound Description Not applicable 05/12/22 1000   Wound Length (cm) 0.8 cm 05/20/22 1500   Wound Width (cm) 0.5 cm 05/20/22 1500   Wound Depth (cm) 0.1 cm 05/20/22 1500   Wound Surface Area (cm^2) 0.4 cm^2 05/20/22 1500   Wound Volume (cm^3) 0.04 cm^3 05/20/22 1500   Wound Healing % 20 05/20/22 1500   Tunneling (cm) 0 cm 05/20/22 1500   Undermining (cm) 0 cm 05/20/22 1500   Wound Odor None 06/03/22 1300   Exposed Structures None 06/03/22 1300   Number of days: 22       Wound 05/20/22 Pressure Injury Right Lower Back (Active)   Wound Image   06/03/22 1300   Site Assessment Light Purple 06/03/22 1300   Periwound Assessment Scar tissue;Fragile 06/03/22 1300   Margins Attached edges 06/03/22 1300   Drainage Amount None 06/03/22  1300   Treatments Cleansed;Site care 06/03/22 1300   Wound Cleansing Puracyn [place] 06/03/22 1300   Periwound Protectant Skin Protectant Wipes to Periwound 06/03/22 1300   Dressing Cleansing/Solutions Not Applicable 06/03/22 1300   Dressing Options Mepilex 06/03/22 1300   Dressing Changed Changed 06/03/22 1300   Dressing Change/Treatment Frequency Monday, Wednesday, Friday, and As Needed 05/27/22 1400   Pressure Injury Stage DTPI 06/03/22 1300   Wound Odor None 06/03/22 1300   Exposed Structures None 06/03/22 1300   Number of days: 14         PROCEDURE: Excisional debridement of sacrococcygeal pressure injury  -2% viscous lidocaine applied topically to wound bed for approximately 5 minutes prior to debridement  -Curette  used to debride wound bed and open closed wound edges.  Excisional debridement was performed to remove devitalized tissue until healthy, bleeding tissue was visualized.  Total area debrided approximately 10 cm², including into wound tracts and undermining.  Tissue debrided into the muscle layer.    -Bleeding controlled with manual pressure.    -Wound care completed by wound RN, refer to flowsheet  -Patient tolerated the procedure well, without c/o pain or discomfort.       PROCEDURE: Excisional debridement of left medial lower leg full-thickness wound, and application of biologic  -2% viscous lidocaine applied topically to wound bed for approximately 5 minutes prior to debridement  -Curette used to debride wound bed.  Excisional debridement was performed to remove devitalized tissue until healthy, bleeding tissue was visualized.  Total area debrided 8.16 cm².  Deepest level of debridement was into the muscle layer.  -Bleeding controlled with manual pressure.    -Wound irrigated thoroughly with normal saline  -A 2 x 3 cm piece of epi cord was hydrated with normal saline and applied to the wound bed.  No wastage of product  -Wound care completed by wound RN, refer to flowsheet  -Patient tolerated the  procedure well, without c/o pain or discomfort.     No need for debridement of left posterior lower leg wound.      BIOLOGIC LOG  5/20/2022-first application.  PRODUCT: EpiCord 2 x 3 cm . PRODUCT #[id number]; EXPIRES: 2026-12-01 5/27/2022- second application.  PRODUCT: EpiCordEX 2 x 3 cm . PRODUCT #[id number]; EXPIRES: 2026-09-01    6/3/2022- third application.  PRODUCT: EpiCordEX 2 x 3 cm . PRODUCT #[id number]; EXPIRES: 2026-10-01    Pertinent Labs and Diagnostics:    Labs:     A1c: No results found for: HBA1C       IMAGING: No results found.    VASCULAR STUDIES: No results found.    LAST  WOUND CULTURE:   Lab Results   Component Value Date/Time    CULTRSULT - (A) 05/19/2022 11:00 AM    CULTRSULT Candida tropicalis  10-50,000 cfu/mL   (A) 05/19/2022 11:00 AM          ASSESSMENT AND PLAN:     1. Sacral decubitus ulcer, stage IV ([facility])  Comments: Ulcer first noted in early April 2022 as small open area which quickly enlarged.  This ulcer is present distal from previous sacral ulcer which healed after surgery in January.  Patient has history of flap reconstruction x2 to this area.    6/3/2022: Significantly less slough to wound bed.  Area and depth of wound has decreased since last assessment.  -Excisional debridement of wound in clinic today, medically necessary to promote wound healing.  -Patient to return to clinic weekly for assessment, debridement, and VAC dressing change  -Home health to change VAC dressing 2 times per week, on Monday and Wednesday, in between clinic visits.  Patient will return to clinic weekly for assessment, debridement, and VAC dressing change  -Plastics referral sent on previous visit.  Referral information was sent to Dr. Chaves's office.  Patient states he has not heard from them yet.   -Consider MRI to assess for OM if wound does not progress adequately  -Patient states pump keeps reading full canister when there is very little drainage.  Notified Mae from [facility]  requesting new pump, and extra canisters.    Wound care: NPWT at 125 mmHg to accelerate granulation, change 3 times per week.      2.  Postoperative wound dehiscence, subsequent encounter  Comments: On 4/26 patient underwent irrigation and debridement of multiple compartments of the left lower extremity states for pressure injury, with bone excision, and complex closure of chronic wound using biologic skin substitute.  During postop visit in surgeons office on 5/11, surgical site was noted to be dehisced.  Patient was referred to wound clinic for management.    6/3/2022: Wound area and depth has decreased since last assessment.  Increased granulation tissue noted.  -Excisional debridement of wound in clinic today, medically necessary to promote wound healing.  -Biologic application, Epicord expandable, to accelerate granulation  -Patient to return to clinic weekly for assessment, debridement, and additional biologic application and VAC dressing change  -Home health is not to disturb biologic or VAC dressing    Wound care: Epicord expandable biologic to accelerate granulation, Adaptic to keep biologic moist, NPWT as adjunctive therapy      3.  Pressure injury of left leg, stage III  Comments: New pressure injury to left posterior lower leg noted first observed in clinic on 5/12/2022.  Located within scar tissue.    6/3/2022: Wound resolved        4.  Pressure injury of deep tissue of right buttock  Comments: First observed in clinic 5/20/2022    6/3/2022: Skin still intact, redness dissipating.  Appears to be resolving  -No need for debridement today.    -Monitor each clinic visit    Wound care: Mepilex foam dressing    5. Quadriplegia, C5-C7, complete (HCC)  Comments: Complicating factor.  Impaired mobility and sensation.    6/3/2022: Patient states he is up in his wheelchair approximately 7 hours/day.  He repositions himself every 1/2 hour using mechanical features of his chair.  -Offloading strategies reviewed in  clinic.  Patient has low-air-loss bed, and appropriate pressure relief cushion in his motorized wheelchair    PATIENT EDUCATION  -Etiology of pressure injury  -Importance of offloading and frequent repositioning  -Strategies for offloading in bed and when seated discussed and demonstrated  - Importance of adequate nutrition for wound healing  - Advised to go to ER for any increased redness, swelling, drainage or odor, or if patient develops fever, chills, nausea or vomiting.    My total time spent caring for the patient on the day of the encounter was 20 minutes.   This does not include time spent on separately billable procedures/tests.      Please note that this note may have been created using voice recognition software. I have worked with technical experts from [facility] to optimize the interface.  I have made every reasonable attempt to correct obvious errors, but there may be errors of grammar and possibly content that I did not discover before finalizing the note.

## 2022-06-03 NOTE — PATIENT INSTRUCTIONS
-Keep your wound dressing clean, dry, and intact.    -Change your dressing if it becomes soiled, soaked, or falls off.    -Wound vac may not have any drainage in tube or cannister & it will still be working.   Change cannister if it does become full by pressing tab on side of machine to remove canister and snap on new one. Full canister can be thrown in the trash. If cannister fills with bright red blood - go to ER. Dressing will be changed every MWF at the wound clini.  If you are having issues with your wound VAC, please consider patching leaks, changing the canister, or calling [phone] for troubleshooting. If the wound VAC has been off or un-operational for over 2 hours, call wound care center to inform them and remove all dressings including black foam and replace with normal saline damp gauze.     Provider applied expandable epicord 2x3 skin substitute today. Do not change primary dressing for one week. You may change the outer dressing, if soiled, saturated, or dislodged.     -Should you experience any significant changes in your wound(s), such as infection (redness, swelling, localized heat, increased pain, fever > 101 F, chills) or have any questions regarding your home care instructions, please contact the wound center at [phone]. If after hours, contact your primary care physician or go to the hospital emergency room.

## 2022-06-10 ENCOUNTER — OFFICE VISIT (OUTPATIENT)
Dept: WOUND CARE | Facility: MEDICAL CENTER | Age: 72
End: 2022-06-10
Attending: INTERNAL MEDICINE
Payer: MEDICARE

## 2022-06-10 VITALS
RESPIRATION RATE: 18 BRPM | OXYGEN SATURATION: 93 % | SYSTOLIC BLOOD PRESSURE: 152 MMHG | HEART RATE: 55 BPM | DIASTOLIC BLOOD PRESSURE: 85 MMHG | TEMPERATURE: 98.2 F

## 2022-06-10 DIAGNOSIS — L89.893 PRESSURE INJURY OF LEFT LEG, STAGE 3 (HCC): ICD-10-CM

## 2022-06-10 DIAGNOSIS — L89.316 PRESSURE INJURY OF DEEP TISSUE OF RIGHT BUTTOCK: ICD-10-CM

## 2022-06-10 DIAGNOSIS — T81.31XD POSTOPERATIVE WOUND DEHISCENCE, SUBSEQUENT ENCOUNTER: ICD-10-CM

## 2022-06-10 DIAGNOSIS — L89.154 SACRAL DECUBITUS ULCER, STAGE IV (HCC): ICD-10-CM

## 2022-06-10 DIAGNOSIS — G82.53 QUADRIPLEGIA, C5-C7, COMPLETE (HCC): ICD-10-CM

## 2022-06-10 PROCEDURE — 97605 NEG PRS WND THER DME<=50SQCM: CPT | Mod: XU

## 2022-06-10 PROCEDURE — 15271 SKIN SUB GRAFT TRNK/ARM/LEG: CPT

## 2022-06-10 PROCEDURE — 15271 SKIN SUB GRAFT TRNK/ARM/LEG: CPT | Performed by: NURSE PRACTITIONER

## 2022-06-10 PROCEDURE — 11042 DBRDMT SUBQ TIS 1ST 20SQCM/<: CPT

## 2022-06-10 PROCEDURE — 99215 OFFICE O/P EST HI 40 MIN: CPT

## 2022-06-10 PROCEDURE — 11043 DBRDMT MUSC&/FSCA 1ST 20/<: CPT | Mod: XS

## 2022-06-10 PROCEDURE — 11043 DBRDMT MUSC&/FSCA 1ST 20/<: CPT | Mod: 59 | Performed by: NURSE PRACTITIONER

## 2022-06-10 ASSESSMENT — ENCOUNTER SYMPTOMS
COUGH: 0
ROS GI COMMENTS: COLOSTOMY IN PLACE
CHILLS: 0
NAUSEA: 0
SHORTNESS OF BREATH: 0
CONSTIPATION: 0
DIARRHEA: 0
FEVER: 0
VOMITING: 0

## 2022-06-10 NOTE — PATIENT INSTRUCTIONS
Wound VAC at 125mmHg continuous. Dressing will be changed every MWF at the wound clinic or with your home health nurse.  If you are having issues with your wound VAC, please consider patching leaks, changing the canister, or calling [phone] for troubleshooting. If the wound VAC has been off or un-operational for over 2 hours, call wound care center to inform them and remove all dressings including black foam and replace with normal saline damp gauze.     Avoid prolonged standing or sitting without elevating your legs.  - Apply tubigrip to your legs ending 2 fingers below back of knee without wrinkles.    Should you experience any significant changes in your wound(s), such as infection (redness, swelling, localized heat, increased pain, fever > 101 F, chills) or have any questions regarding your home care instructions, please contact the wound center at [phone]. If after hours, contact your primary care physician or go to the hospital emergency room.   Keep dressing clean, dry and covered while bathing. Only change dressing if it becomes over saturated, soiled or falls off.

## 2022-06-10 NOTE — PROGRESS NOTES
Provider Encounter- Pressure Injury        HISTORY OF PRESENT ILLNESS  Wound History:    START OF CARE IN CLINIC: 5/3/2022    REFERRING PROVIDER: Sahara Mendez      WOUNDS- Pressure Injury, Sacrococcygeal, stage IV                                                             Surgical wound dehiscence -status post surgical I&D of stage IV pressure injury and         biologic application                      Pressure injury, stage III, left posterior lower leg-first observed in clinic 5/12/2022; resolved 6/3/2022; reopened 6/10/2022                                 Pressure injury, DTI, right buttock/lower back-first observed in clinic 5/20/2022     HISTORY: Patient with history of incomplete quadriplegia referred to [facility] for treatment of a stage IV pressure injury.  He has a history of previous pressure injuries to this area, and underwent muscle flaps in 2019, and then again in 2020.  He was seen in the wound clinic in November 2021 for an ulcer proximal from his current ulcer, and pressure injuries to his left posterior lower leg and left heel.  At that time, it was discovered that the patient had retained VAC foam embedded in the wound bed of the sacral wound.  Attempts were made to get him back to his plastic surgeon, though unsuccessful.  In January he underwent surgical removal of VAC sponge along with excisional debridement of his sacral wound by Dr. Chaves.  After the surgery, his wound went on to heal without incident.   In early April 2022, his home health nurse noted a new sacral ulcer, below the previous ulcer which quickly tripled in size over the following weeks.  The ulcer to his left medial lower leg had also deteriorated, with bone visible at the base..  He was hospitalized from 4/22 until 4/27/2022 and underwent surgery with Dr. Raman on 4/26 for irrigation and debridement of multiple compartments of the left lower extremity, bone excision, and complex closure of chronic wound using biologic skin  substitute.   His sacrococcygeal wound was not surgically addressed during this admission.  He was discharged back to his group home, with home health, and referral to outpatient wound clinic for his sacral wound.  He was instructed to follow-up with his surgeon for his lower leg wound.       Postoperatively, the left medial lower leg incision dehisced.  He was seen by his surgeon at [facility] on 5/11.  The surgeon opted to leave remaining sutures in place, and refer him to the wound clinic for treatment of this wound.   Treatment of this wound was initiated in clinic on 5/12.  During this visit was also noted that his heel DTI had resolved, but that he had a new pressure injury to his left posterior lower extremity.     A new pressure injury was noted to patient's right upper buttock/lower back on 5/20/2022.  Wound was linear in shape, skin discolored but intact.    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  Never used smokeless tobacco    Patient's problem list, allergies, and current medications reviewed and updated in Epic    Interval History:  5/3/2022 : Clinic visit with ADILSON Arthur, ANAHIP-BC, CWDINESHN, CFCN.  Patient states he is feeling well overall, denies fevers, chills, nausea, vomiting, cough or shortness of breath.  During his initial assessment, a piece of fenestrated tubing, approximately 6 inches long, 3 to 5 mm in diameter, was identified and removed from his sacral wound.  Patient informed me that after his flap procedure in 2020 it was believed that part of a drain tube was retained within his wound.  The surgeon at the time decided not to explore the wound.   The  surgical dressing was removed from his left lower leg wound.  Steri-Strips in place over biologic.  Patient informed me that he has a follow-up appointment with Dr. Raman a week from tomorrow (5/11).  Patient is under the impression that Dr. Raman will be managing this wound.      5/12/2022 : Clinic visit with ADILSON Arthur, HOLDEN, IMAN, LILIYA.  Anjum states he is feeling very well.  He was seen by Dr. Raman yesterday who was concerned with the appearance of his lower leg wound.  Per Dr. Raman's note, sutures to be left in place for another week.  No residual biologic product noted within wound bed.  We will request authorization to continue.   Patient sacral wound is with significantly less slough than last week.  Discussed with patient treating with VAC.  Given his past history of retained foam, expected him to be resistant to the idea, however he stated he was willing to do what ever necessary to expedite healing of this wound.   Patient is up in his wheelchair 7 to 8 hours/day, has appropriate pressure relief cushion in his chair, and the ability to raise and lower back to his wheelchair mechanically.  He also knows to physically reposition himself every 1/2 hour.  He has a low-air-loss bed on his mattress, with alternating pressure.  He is very well versed on pressure-relieving techniques.    5/20/2022 : Clinic visit with ADILSON Arthur, HOLDEN, IMAN, LILIYA.  Anjum states he is feeling well today.  He understands that the VAC is to be initiated on his sacral wound today.  He verbalizes some worry about possibility of VAC retention, as this was a problem before.  I assured him that we would provide appropriate precautions, and would make sure that we write on the outside of the dressing number of pieces of foam used, and we would be communicating with home health.   We have also been approved for biologic to his left posterior lower leg wound and will be initiating this today.  Sutures also  removed as recommended by Dr. Raman.  The smaller, more superficial wound to his posterior lower leg appears to be crusting adequately    5/27/2022 : Clinic visit with ADILSON Arthur FNP-BC, LILIYA VALERIO.  Brandon continues to feel well overall.  He has had no problems with the VAC to his sacrum over this past week.  We have received approval to apply VAC to his leg wound also, over the biologic.  Home health will continue to change the sacral VAC dressing 2 times per week in between clinic visits.  They will be instructed not to disturb the VAC and biologic to his leg.  This will be changed in clinic weekly.   The pressure injuries to his left posterior lower leg, and right lower back/upper buttock appear to be resolving.  No need to debride these today.    6/3/2022 : Clinic visit with ADILSON Arthur FNP-BC, LILIYA VALERIO.  Anjum states that he is feeling very well.  He was seen by Dr. Raman's PA earlier today.  VAC was not removed because patient was worried about VAC being off until he was seen in the clinic.  She did review his wound photos in epic.  Patient also informing that his home health nurses were worried about the appearance of his sacral wound last Friday, and cultured the wound, received order from patient's PCP, and sent culture to [facility].  Unfortunately, I do not have access to these results.  Wound did not appear to be infected, no appreciable odor, evidence of new granulation.  The pressure ulcer to his left posterior lower leg has resolved.  DTI to right upper buttock also appears to be resolving.   Patient states he has had problems with frequent notifications from his pump that canister is full, even when it is not.  He has been going through canisters every day or so.  I notified [facility] representative, Mae Mccullough, requesting new pump be delivered, and perhaps a few more canisters.    6/10/2022 : Clinic visit with ADILSON Arthur FNP-BC, LILIYA VALERIO.  Anjum states that he continues to  feel well.  Home health is seeing him 2 times per week in between clinic visits.  [employer] replace his VAC machine last week, he has had no further problems.  He has been taking urgent need as recommended, though admits that he does not take it as often as he should.  He has been trying to eat a high-protein diet.  He has not yet made an appointment with Dr. Chaves for plastic surgery consult.  I recommended that he do so.   He presents today with reopening of posterior left lower leg pressure injury, which had been resolved last week.  Treatment restarted.   He does raise concern today that there is no provider available in clinic on 7/8.  We discussed possibly skipping wound clinic appointment that week, and having home health change of VAC to leg wound on that date.  As we get closer to this date, we will clarify.      REVIEW OF SYSTEMS:   Review of Systems   Constitutional: Negative for chills and fever.   Respiratory: Negative for cough and shortness of breath.    Cardiovascular: Negative for chest pain.   Gastrointestinal: Negative for constipation, diarrhea, nausea and vomiting.        Colostomy in place   Genitourinary:        Suprapubic catheter    Musculoskeletal:        Incomplete quadriplegia       PHYSICAL EXAMINATION:   BP (!) 152/85   Pulse (!) 55   Temp 36.8 °C (98.2 °F)   Resp 18   SpO2 93%   Physical Exam  Constitutional:       Appearance: He is obese.   HENT:      Head: Normocephalic.   Eyes:      Pupils: Pupils are equal, round, and reactive to light.   Pulmonary:      Effort: Pulmonary effort is normal.   Abdominal:      Palpations: Abdomen is soft.   Skin:     Comments: Coccygeal pressure injury, stage IV     Neurological:      Mental Status: He is alert.         WOUND ASSESSMENT  Wound 04/11/22 Full Thickness Wound Leg Medial Left --Left Medial Lower Leg (Active)   Wound Image    06/10/22 1441   Site Assessment Red;Yellow 06/10/22 1441   Periwound Assessment Edema;Fragile 06/10/22 1441    Margins Unattached edges 06/10/22 1441   Drainage Amount Large 06/10/22 1441   Drainage Description Serosanguineous 06/10/22 1441   Treatments Cleansed;Provider debridement;Site care 06/10/22 1441   Wound Cleansing Normal Saline Irrigation 06/10/22 1441   Periwound Protectant Skin Protectant Wipes to Periwound;Benzoin;Paste Ring;Drape 06/10/22 1441   Dressing Cleansing/Solutions Normal Saline 06/10/22 1441   Dressing Options Biologic (Skin Substitute);Adaptic;Steri-Strip;Wound Vac;Tubigrip;Mepilex 06/10/22 1441   Dressing Changed New 05/20/22 1500   Dressing Status Clean;Dry;Intact 04/11/22 1330   Dressing Change/Treatment Frequency Monday, Wednesday, Friday, and As Needed 05/27/22 1400   Non-staged Wound Description Full thickness 06/10/22 1441   Wound Length (cm) 3.8 cm 06/10/22 1441   Wound Width (cm) 1.6 cm 06/10/22 1441   Wound Depth (cm) 1.1 cm 06/10/22 1441   Wound Surface Area (cm^2) 6.08 cm^2 06/10/22 1441   Wound Volume (cm^3) 6.688 cm^3 06/10/22 1441   Post-Procedure Length (cm) 3.9 cm 06/10/22 1441   Post-Procedure Width (cm) 1.7 cm 06/10/22 1441   Post-Procedure Depth (cm) 1.1 cm 06/10/22 1441   Post-Procedure Surface Area (cm^2) 6.63 cm^2 06/10/22 1441   Post-Procedure Volume (cm^3) 7.293 cm^3 06/10/22 1441   Wound Healing % -[id number] 06/10/22 1441   Tunneling (cm) 1.4 cm 06/10/22 1441   Tunneling Clock Position of Wound 12 06/10/22 1441   Undermining (cm) 0 cm 06/10/22 1441   Undermining of Wound, 1st Location From 2 o'clock;To 4 o'clock 06/03/22 1300   Wound Odor Mild;Foul 06/10/22 1441   Exposed Structures Fascia 06/10/22 1441   Number of days: 60       Wound 04/22/22 Pressure Injury Sacrum POA stage 4 (Active)   Wound Image    06/10/22 1441   Site Assessment Red;Yellow 06/10/22 1441   Periwound Assessment Scar tissue 06/10/22 1441   Margins Unattached edges 06/10/22 1441   Drainage Amount Moderate 06/10/22 1441   Drainage Description Serosanguineous 06/10/22 1441   Treatments Cleansed;Provider  debridement 06/10/22 1441   Wound Cleansing Normal Saline Irrigation 06/10/22 1441   Periwound Protectant Skin Protectant Wipes to Periwound;Benzoin;Paste Ring;Drape 06/10/22 1441   Dressing Cleansing/Solutions Not Applicable 06/10/22 1441   Dressing Options Wound Vac;Mepilex 06/10/22 1441   Dressing Changed Changed 06/03/22 1300   Dressing Change/Treatment Frequency Monday, Wednesday, Friday, and As Needed 05/27/22 1400   Pressure Injury Stage 4 06/10/22 1441   Wound Length (cm) 2.5 cm 06/10/22 1441   Wound Width (cm) 2.3 cm 06/10/22 1441   Wound Depth (cm) 2.5 cm 06/10/22 1441   Wound Surface Area (cm^2) 5.75 cm^2 06/10/22 1441   Wound Volume (cm^3) 14.375 cm^3 06/10/22 1441   Post-Procedure Length (cm) 2.5 cm 06/10/22 1441   Post-Procedure Width (cm) 2.5 cm 06/10/22 1441   Post-Procedure Depth (cm) 2.5 cm 06/10/22 1441   Post-Procedure Surface Area (cm^2) 6.25 cm^2 06/10/22 1441   Post-Procedure Volume (cm^3) 15.625 cm^3 06/10/22 1441   Wound Healing % -499 06/10/22 1441   Tunneling (cm) 0 cm 06/10/22 1441   Tunneling Clock Position of Wound 12 05/03/22 1600   Undermining (cm) 4 cm 06/10/22 1441   Undermining of Wound, 1st Location From 9 o'clock;To 3 o'clock 06/10/22 1441   Wound Odor None 06/10/22 1441   Exposed Structures Fascia 06/10/22 1441   Number of days: 49       Wound 05/12/22 Pressure Injury Leg Lower;Posterior Left stage 3 (Active)   Wound Image   06/10/22 1441   Site Assessment Red;Brown;Yellow 06/10/22 1441   Periwound Assessment Scar tissue;Fragile 06/10/22 1441   Margins Attached edges 06/10/22 1441   Drainage Amount None 06/10/22 1441   Drainage Description Serosanguineous 05/20/22 1500   Treatments Cleansed;Site care 06/10/22 1441   Wound Cleansing Normal Saline Irrigation 06/10/22 1441   Periwound Protectant Skin Protectant Wipes to Periwound 06/10/22 1441   Dressing Cleansing/Solutions Not Applicable 06/10/22 1441   Dressing Options Hydrofiber Silver;Mepilex;Tubigrip 06/10/22 1441   Dressing  Changed Changed 06/03/22 1300   Dressing Status Clean;Dry;Intact 05/12/22 1000   Dressing Change/Treatment Frequency Every 72 hrs, and As Needed 05/27/22 1400   Pressure Injury Stage 3 06/10/22 1441   Non-staged Wound Description Not applicable 05/12/22 1000   Wound Length (cm) 0.7 cm 06/10/22 1441   Wound Width (cm) 1 cm 06/10/22 1441   Wound Depth (cm) 0.1 cm 06/10/22 1441   Wound Surface Area (cm^2) 0.7 cm^2 06/10/22 1441   Wound Volume (cm^3) 0.07 cm^3 06/10/22 1441   Wound Healing % -40 06/10/22 1441   Tunneling (cm) 0 cm 06/10/22 1441   Undermining (cm) 0 cm 06/10/22 1441   Wound Odor None 06/10/22 1441   Exposed Structures None 06/10/22 1441   Number of days: 29       Wound 05/20/22 Pressure Injury Right Lower Back (Active)   Wound Image    06/10/22 1441   Site Assessment Manley 06/10/22 1441   Periwound Assessment Scar tissue;Fragile 06/10/22 1441   Margins Attached edges 06/03/22 1300   Drainage Amount None 06/10/22 1441   Treatments Cleansed;Site care 06/10/22 1441   Wound Cleansing Normal Saline Irrigation 06/10/22 1441   Periwound Protectant Skin Protectant Wipes to Periwound 06/10/22 1441   Dressing Cleansing/Solutions Not Applicable 06/10/22 1441   Dressing Options Mepilex 06/10/22 1441   Dressing Changed Changed 06/03/22 1300   Dressing Change/Treatment Frequency Monday, Wednesday, Friday, and As Needed 05/27/22 1400   Pressure Injury Stage DTPI 06/10/22 1441   Wound Odor None 06/03/22 1300   Exposed Structures None 06/03/22 1300   Number of days: 21         PROCEDURE: Excisional debridement of sacrococcygeal pressure injury  -2% viscous lidocaine applied topically to wound bed for approximately 5 minutes prior to debridement  -Curette  used to debride wound bed and open closed wound edges.  Excisional debridement was performed to remove devitalized tissue until healthy, bleeding tissue was visualized.  Total area debrided approximately 12 cm², including into wound tracts and undermining.  Tissue  debrided into the muscle layer.    -Bleeding controlled with manual pressure.    -Wound care completed by wound RN, refer to flowsheet  -Patient tolerated the procedure well, without c/o pain or discomfort.       PROCEDURE: Excisional debridement of left medial lower leg full-thickness wound, and application of biologic  -2% viscous lidocaine applied topically to wound bed for approximately 5 minutes prior to debridement  -Curette used to debride wound bed.  Excisional debridement was performed to remove devitalized tissue until healthy, bleeding tissue was visualized.  Total area debrided 6.63 cm².  Deepest level of debridement was into the muscle layer.  -Bleeding controlled with manual pressure.    -Wound irrigated thoroughly with normal saline  -A 2 x 3 cm piece of epi cord expandable was hydrated with normal saline and applied to the wound bed.  No wastage of product  -Wound care completed by wound RN, refer to flowsheet  -Patient tolerated the procedure well, without c/o pain or discomfort.     No need for debridement of left posterior lower leg wound.      BIOLOGIC LOG  5/20/2022-first application.  PRODUCT: EpiCord 2 x 3 cm . PRODUCT #[id number]; EXPIRES: 2026-12-01 5/27/2022- second application.  PRODUCT: EpiCordEX 2 x 3 cm . PRODUCT #[id number]; EXPIRES: 2026-09-01    6/3/2022- third application.  PRODUCT: EpiCordEX 2 x 3 cm . PRODUCT #[id number]; EXPIRES: 2026-10-01    6/10/2022- fourth application.  PRODUCT: EpiCordEX 2 x 3 cm . PRODUCT #[id number]; EXPIRES: 2026-09-01    Pertinent Labs and Diagnostics:    Labs:     A1c: No results found for: HBA1C       IMAGING: No results found.    VASCULAR STUDIES: No results found.    LAST  WOUND CULTURE:   Lab Results   Component Value Date/Time    CULTRSULT - (A) 05/19/2022 11:00 AM    CULTRSULT Candida tropicalis  10-50,000 cfu/mL   (A) 05/19/2022 11:00 AM          ASSESSMENT AND PLAN:     1. Sacral decubitus ulcer, stage IV  ([facility])  Comments: Ulcer first noted in early April 2022 as small open area which quickly enlarged.  This ulcer is present distal from previous sacral ulcer which healed after surgery in January.  Patient has history of flap reconstruction x2 to this area.    6/10/2022: Wound area and depth has increased since last assessment.  Thin layer of slough to wound bed.  Periwound intact.  He has not had any further problems with the VAC since pump replaced.  -Excisional debridement of wound in clinic today, medically necessary to promote wound healing.  -Patient to return to clinic weekly for assessment, debridement, and VAC dressing change  -Home health to change VAC dressing 2 times per week, on Monday and Wednesday, in between clinic visits.  Patient will return to clinic weekly for assessment, debridement, and VAC dressing change  -Plastics referral sent on previous visit.  Referral information was sent to Dr. Chaves's office.  Patient states he has not heard from them yet.  I advised him to call make an appointment.  -Consider MRI to assess for OM if wound does not progress adequately      Wound care: NPWT at 125 mmHg to accelerate granulation, change 3 times per week.      2.  Postoperative wound dehiscence, subsequent encounter  Comments: On 4/26 patient underwent irrigation and debridement of multiple compartments of the left lower extremity states for pressure injury, with bone excision, and complex closure of chronic wound using biologic skin substitute.  During postop visit in surgeons office on 5/11, surgical site was noted to be dehisced.  Patient was referred to wound clinic for management.    6/10/2022: Wound area has decreased since last assessment.  Granulation tissue increasing.  -Excisional debridement of wound in clinic today, medically necessary to promote wound healing.  -Biologic application, Epicord expandable, to accelerate granulation  -Patient to return to clinic weekly for assessment, debridement,  and additional biologic application and VAC dressing change  -Home health is not to disturb biologic or VAC dressing    Wound care: Epicord expandable biologic to accelerate granulation, Adaptic to keep biologic moist, NPWT as adjunctive therapy      3.  Pressure injury of left leg, stage III  Comments: New pressure injury to left posterior lower leg noted first observed in clinic on 5/12/2022.  Located within scar tissue.    6/10/2022: Wound was noted as resolved last week.  It has now reopened, though very shallow.  -No need for debridement in clinic today.  -Monitor each clinic visit  -Pressure relieving strategies reviewed.    Wound care: Silver Hydrofiber to manage exudate and bioburden, foam cover dressing, Tubigrip to manage edema      4.  Pressure injury of deep tissue of right buttock  Comments: First observed in clinic 5/20/2022    6/10/2022: Skin remains intact, redness dissipating.  Appears to be resolving  -No need for debridement today.    -Monitor each clinic visit    Wound care: Mepilex foam dressing    5. Quadriplegia, C5-C7, complete (HCC)  Comments: Complicating factor.  Impaired mobility and sensation.    6/3/2022: Patient states he is up in his wheelchair approximately 7 hours/day.  He repositions himself every 1/2 hour using mechanical features of his chair.  -Offloading strategies reviewed in clinic.  Patient has low-air-loss bed, and appropriate pressure relief cushion in his motorized wheelchair    PATIENT EDUCATION  -Etiology of pressure injury  -Importance of offloading and frequent repositioning  -Strategies for offloading in bed and when seated discussed and demonstrated  - Importance of adequate nutrition for wound healing  - Advised to go to ER for any increased redness, swelling, drainage or odor, or if patient develops fever, chills, nausea or vomiting.          Please note that this note may have been created using voice recognition software. I have worked with technical experts  from [facility] to optimize the interface.  I have made every reasonable attempt to correct obvious errors, but there may be errors of grammar and possibly content that I did not discover before finalizing the note.

## 2022-06-11 ENCOUNTER — NON-PROVIDER VISIT (OUTPATIENT)
Dept: CARDIOLOGY | Facility: MEDICAL CENTER | Age: 72
End: 2022-06-11
Payer: MEDICARE

## 2022-06-11 PROCEDURE — 93298 REM INTERROG DEV EVAL SCRMS: CPT | Performed by: INTERNAL MEDICINE

## 2022-06-17 ENCOUNTER — OFFICE VISIT (OUTPATIENT)
Dept: WOUND CARE | Facility: MEDICAL CENTER | Age: 72
End: 2022-06-17
Attending: INTERNAL MEDICINE
Payer: MEDICARE

## 2022-06-17 VITALS
DIASTOLIC BLOOD PRESSURE: 95 MMHG | RESPIRATION RATE: 18 BRPM | OXYGEN SATURATION: 97 % | HEART RATE: 67 BPM | TEMPERATURE: 97.6 F | SYSTOLIC BLOOD PRESSURE: 177 MMHG

## 2022-06-17 DIAGNOSIS — L89.154 SACRAL DECUBITUS ULCER, STAGE IV (HCC): Primary | ICD-10-CM

## 2022-06-17 DIAGNOSIS — L89.893 PRESSURE INJURY OF LEFT LEG, STAGE 3 (HCC): ICD-10-CM

## 2022-06-17 DIAGNOSIS — L89.316 PRESSURE INJURY OF DEEP TISSUE OF RIGHT BUTTOCK: ICD-10-CM

## 2022-06-17 DIAGNOSIS — G82.53 QUADRIPLEGIA, C5-C7, COMPLETE (HCC): ICD-10-CM

## 2022-06-17 DIAGNOSIS — T81.31XD POSTOPERATIVE WOUND DEHISCENCE, SUBSEQUENT ENCOUNTER: ICD-10-CM

## 2022-06-17 PROCEDURE — 11043 DBRDMT MUSC&/FSCA 1ST 20/<: CPT | Mod: 59 | Performed by: NURSE PRACTITIONER

## 2022-06-17 PROCEDURE — 97605 NEG PRS WND THER DME<=50SQCM: CPT

## 2022-06-17 PROCEDURE — 11043 DBRDMT MUSC&/FSCA 1ST 20/<: CPT | Mod: XS

## 2022-06-17 PROCEDURE — 99213 OFFICE O/P EST LOW 20 MIN: CPT | Mod: 25 | Performed by: NURSE PRACTITIONER

## 2022-06-17 PROCEDURE — 99214 OFFICE O/P EST MOD 30 MIN: CPT | Mod: 25

## 2022-06-17 PROCEDURE — 15271 SKIN SUB GRAFT TRNK/ARM/LEG: CPT | Performed by: NURSE PRACTITIONER

## 2022-06-17 PROCEDURE — 15271 SKIN SUB GRAFT TRNK/ARM/LEG: CPT

## 2022-06-17 ASSESSMENT — ENCOUNTER SYMPTOMS
CONSTIPATION: 0
NAUSEA: 0
VOMITING: 0
COUGH: 0
ROS GI COMMENTS: COLOSTOMY IN PLACE
DIARRHEA: 0
SHORTNESS OF BREATH: 0
CHILLS: 0
FEVER: 0

## 2022-06-17 NOTE — PATIENT INSTRUCTIONS
-Keep your wound dressing clean, dry, and intact.    -Change your dressing if it becomes soiled, soaked, or falls off.    -Wound vac may not have any drainage in tube or cannister & it will still be working.   Change cannister if it does become full by pressing tab on side of machine to remove canister and snap on new one. Full canister can be thrown in the trash. If cannister fills with bright red blood - go to ER. Dressing will be changed every MWF at the wound clini.  If you are having issues with your wound VAC, please consider patching leaks, changing the canister, or calling [phone] for troubleshooting. If the wound VAC has been off or un-operational for over 2 hours, call wound care center to inform them and remove all dressings including black foam and replace with normal saline damp gauze.     -Should you experience any significant changes in your wound(s), such as infection (redness, swelling, localized heat, increased pain, fever > 101 F, chills) or have any questions regarding your home care instructions, please contact the wound center at [phone]. If after hours, contact your primary care physician or go to the hospital emergency room.

## 2022-06-17 NOTE — PROGRESS NOTES
Provider Encounter- Pressure Injury        HISTORY OF PRESENT ILLNESS  Wound History:    START OF CARE IN CLINIC: 5/3/2022    REFERRING PROVIDER: Sahara Mendez      WOUNDS- Pressure Injury, Sacrococcygeal, stage IV                                                             Surgical wound dehiscence -status post surgical I&D of stage IV pressure injury and         biologic application                      Pressure injury, stage III, left posterior lower leg-first observed in clinic 5/12/2022; resolved 6/3/2022; reopened 6/10/2022; resolved again 6/17/2022                                 Pressure injury, DTI, right buttock/lower back-first observed in clinic 5/20/2022; resolved 6/17/2022     HISTORY: Patient with history of incomplete quadriplegia referred to [facility] for treatment of a stage IV pressure injury.  He has a history of previous pressure injuries to this area, and underwent muscle flaps in 2019, and then again in 2020.  He was seen in the wound clinic in November 2021 for an ulcer proximal from his current ulcer, and pressure injuries to his left posterior lower leg and left heel.  At that time, it was discovered that the patient had retained VAC foam embedded in the wound bed of the sacral wound.  Attempts were made to get him back to his plastic surgeon, though unsuccessful.  In January he underwent surgical removal of VAC sponge along with excisional debridement of his sacral wound by Dr. Chaves.  After the surgery, his wound went on to heal without incident.   In early April 2022, his home health nurse noted a new sacral ulcer, below the previous ulcer which quickly tripled in size over the following weeks.  The ulcer to his left medial lower leg had also deteriorated, with bone visible at the base..  He was hospitalized from 4/22 until 4/27/2022 and underwent surgery with Dr. Raman on 4/26 for irrigation and debridement of multiple compartments of the left lower extremity, bone excision, and complex  closure of chronic wound using biologic skin substitute.   His sacrococcygeal wound was not surgically addressed during this admission.  He was discharged back to his group home, with home health, and referral to outpatient wound clinic for his sacral wound.  He was instructed to follow-up with his surgeon for his lower leg wound.       Postoperatively, the left medial lower leg incision dehisced.  He was seen by his surgeon at [facility] on 5/11.  The surgeon opted to leave remaining sutures in place, and refer him to the wound clinic for treatment of this wound.   Treatment of this wound was initiated in clinic on 5/12.  During this visit was also noted that his heel DTI had resolved, but that he had a new pressure injury to his left posterior lower extremity.     A new pressure injury was noted to patient's right upper buttock/lower back on 5/20/2022.  Wound was linear in shape, skin discolored but intact.    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  Never used smokeless tobacco    Patient's problem list, allergies, and current medications reviewed and updated in Epic    Interval History:  5/3/2022 : Clinic visit with ADILSON Arthur, DAT-BC, CWDINESHN, CFCN.  Patient states he is feeling well overall, denies fevers, chills, nausea, vomiting, cough or shortness of breath.  During his initial assessment, a piece of fenestrated tubing, approximately 6 inches long, 3 to 5 mm in diameter, was identified and removed from his sacral wound.  Patient informed me that after his flap procedure in 2020 it was believed that part of a drain tube was retained within his wound.  The surgeon at the time  decided not to explore the wound.   The surgical dressing was removed from his left lower leg wound.  Steri-Strips in place over biologic.  Patient informed me that he has a follow-up appointment with Dr. Raman a week from tomorrow (5/11).  Patient is under the impression that Dr. Raman will be managing this wound.      5/12/2022 : Clinic visit with ADILSON Arthur, HOLDEN, IMAN, LILIYA.  Anjum states he is feeling very well.  He was seen by Dr. Raman yesterday who was concerned with the appearance of his lower leg wound.  Per Dr. Raman's note, sutures to be left in place for another week.  No residual biologic product noted within wound bed.  We will request authorization to continue.   Patient sacral wound is with significantly less slough than last week.  Discussed with patient treating with VAC.  Given his past history of retained foam, expected him to be resistant to the idea, however he stated he was willing to do what ever necessary to expedite healing of this wound.   Patient is up in his wheelchair 7 to 8 hours/day, has appropriate pressure relief cushion in his chair, and the ability to raise and lower back to his wheelchair mechanically.  He also knows to physically reposition himself every 1/2 hour.  He has a low-air-loss bed on his mattress, with alternating pressure.  He is very well versed on pressure-relieving techniques.    5/20/2022 : Clinic visit with ADILSON Arthur, HOLDEN, IMAN, LILIYA.  Anjum states he is feeling well today.  He understands that the VAC is to be initiated on his sacral wound today.  He verbalizes some worry about possibility of VAC retention, as this was a problem before.  I assured him that we would provide appropriate precautions, and would make sure that we write on the outside of the dressing number of pieces of foam used, and we would be communicating with home health.   We have also been approved for biologic to his left posterior lower leg wound and will be  initiating this today.  Sutures also removed as recommended by Dr. Raman.  The smaller, more superficial wound to his posterior lower leg appears to be crusting adequately    5/27/2022 : Clinic visit with ADILSON Arthur FNP-BC, IMAN, LILIYA.  Brandon continues to feel well overall.  He has had no problems with the VAC to his sacrum over this past week.  We have received approval to apply VAC to his leg wound also, over the biologic.  Home health will continue to change the sacral VAC dressing 2 times per week in between clinic visits.  They will be instructed not to disturb the VAC and biologic to his leg.  This will be changed in clinic weekly.   The pressure injuries to his left posterior lower leg, and right lower back/upper buttock appear to be resolving.  No need to debride these today.    6/3/2022 : Clinic visit with ADILSON Arthur FNP-BC, IMAN, LILIYA.  Turk states that he is feeling very well.  He was seen by Dr. Raman's PA earlier today.  VAC was not removed because patient was worried about VAC being off until he was seen in the clinic.  She did review his wound photos in epic.  Patient also informing that his home health nurses were worried about the appearance of his sacral wound last Friday, and cultured the wound, received order from patient's PCP, and sent culture to [facility].  Unfortunately, I do not have access to these results.  Wound did not appear to be infected, no appreciable odor, evidence of new granulation.  The pressure ulcer to his left posterior lower leg has resolved.  DTI to right upper buttock also appears to be resolving.   Patient states he has had problems with frequent notifications from his pump that canister is full, even when it is not.  He has been going through canisters every day or so.  I notified [facility] representative, Mae Mccullough, requesting new pump be delivered, and perhaps a few more canisters.    6/10/2022 : Clinic visit with ADILSON Arthur FNP-BC, IMAN,  LILIYADeniz Valero states that he continues to feel well.  Home health is seeing him 2 times per week in between clinic visits.  [employer] replace his VAC machine last week, he has had no further problems.  He has been taking urgent need as recommended, though admits that he does not take it as often as he should.  He has been trying to eat a high-protein diet.  He has not yet made an appointment with Dr. Chaves for plastic surgery consult.  I recommended that he do so.   He presents today with reopening of posterior left lower leg pressure injury, which had been resolved last week.  Treatment restarted.   He does raise concern today that there is no provider available in clinic on 7/8.  We discussed possibly skipping wound clinic appointment that week, and having home health change of VAC to leg wound on that date.  As we get closer to this date, we will clarify.      6/17/2022 : Clinic visit with ADILSON Arthur, HOLDEN, IMAN, LILIYADeniz Valero continues to feel well, tolerating VAC without difficulty.  States he has finally heard back from Dr. Chaves's office, was informed that he is no longer doing wound care, at least through the end of the year.  Patient's sacral wound is not progressing, and in fact presents today with noticeable odor and poor tissue quality.  His leg wound however has improved slightly with decreased depth to distal wound, and increased granulation tissue.  Indentation all around periwound, caused by paste ring.  We opted to not use paste during in clinic today.   Suggested possible hospitalization because of sacral wound.  However, patient was not enthused about this idea.  Puracyn gel applied to wound bed prior to placement of VAC.  Instructions were written for home health to discontinue VAC on Monday and start daily Dakin's moistened gauze in an effort to manage bioburden.  We would have started Dakin's today, however patient states he cannot change his own dressing, and we did not think that home  health would come out over the weekend.   I will attempt to refer patient to Dr. Browne at [facility].  I will also reach out to ID regarding condition of sacral wound.  Patient would likely benefit from hospitalization for surgical I&D, vera flow VAC for a few days, and possibly IV antibiotics.         REVIEW OF SYSTEMS:   Review of Systems   Constitutional: Negative for chills and fever.   Respiratory: Negative for cough and shortness of breath.    Cardiovascular: Negative for chest pain.   Gastrointestinal: Negative for constipation, diarrhea, nausea and vomiting.        Colostomy in place   Genitourinary:        Suprapubic catheter    Musculoskeletal:        Incomplete quadriplegia       PHYSICAL EXAMINATION:   BP (!) 177/95 (BP Location: Left arm, Patient Position: Sitting) Comment: RN notified  Pulse 67   Temp 36.4 °C (97.6 °F)   Resp 18   SpO2 97%   Physical Exam  Constitutional:       Appearance: He is obese.   HENT:      Head: Normocephalic.   Eyes:      Pupils: Pupils are equal, round, and reactive to light.   Pulmonary:      Effort: Pulmonary effort is normal.   Abdominal:      Palpations: Abdomen is soft.   Skin:     Comments: Coccygeal pressure injury, stage IV     Neurological:      Mental Status: He is alert.         WOUND ASSESSMENT  Wound 04/11/22 Full Thickness Wound Leg Medial Left --Left Medial Lower Leg (Active)   Wound Image    06/17/22 1400   Site Assessment Red;Yellow 06/17/22 1400   Periwound Assessment Edema;Fragile 06/17/22 1400   Margins Unattached edges 06/17/22 1400   Drainage Amount Large 06/17/22 1400   Drainage Description Serosanguineous 06/17/22 1400   Treatments Cleansed;Provider debridement 06/17/22 1400   Wound Cleansing Normal Saline Irrigation 06/17/22 1400   Periwound Protectant Skin Protectant Wipes to Periwound;Benzoin;Drape 06/17/22 1400   Dressing Cleansing/Solutions Normal Saline 06/17/22 1400   Dressing Options Biologic (Skin Substitute);Adaptic;Steri-Strip;Wound  Vac;Tubigrip 06/17/22 1400   Dressing Changed Changed 06/17/22 1400   Dressing Status Clean;Dry;Intact 04/11/22 1330   Dressing Change/Treatment Frequency Monday, Wednesday, Friday, and As Needed 05/27/22 1400   Non-staged Wound Description Full thickness 06/17/22 1400   Wound Length (cm) 4.2 cm 06/17/22 1400   Wound Width (cm) 1.8 cm 06/17/22 1400   Wound Depth (cm) 1.7 cm 06/17/22 1400   Wound Surface Area (cm^2) 7.56 cm^2 06/17/22 1400   Wound Volume (cm^3) 12.852 cm^3 06/17/22 1400   Post-Procedure Length (cm) 4.2 cm 06/17/22 1400   Post-Procedure Width (cm) 1.9 cm 06/17/22 1400   Post-Procedure Depth (cm) 1.8 cm 06/17/22 1400   Post-Procedure Surface Area (cm^2) 7.98 cm^2 06/17/22 1400   Post-Procedure Volume (cm^3) 14.364 cm^3 06/17/22 1400   Wound Healing % -[id number] 06/17/22 1400   Tunneling (cm) 1.4 cm 06/17/22 1400   Tunneling Clock Position of Wound 12 06/17/22 1400   Undermining (cm) 0 cm 06/17/22 1400   Undermining of Wound, 1st Location From 2 o'clock;To 4 o'clock 06/03/22 1300   Wound Odor Foul 06/17/22 1400   Exposed Structures None 06/17/22 1400   Number of days: 67       Wound 04/22/22 Pressure Injury Sacrum POA stage 4 (Active)   Wound Image    06/17/22 1400   Site Assessment Red;Yellow 06/17/22 1400   Periwound Assessment Scar tissue 06/17/22 1400   Margins Unattached edges 06/17/22 1400   Drainage Amount Large 06/17/22 1400   Drainage Description Serosanguineous 06/17/22 1400   Treatments Cleansed;Provider debridement 06/17/22 1400   Wound Cleansing Normal Saline Irrigation 06/17/22 1400   Periwound Protectant Benzoin;Paste Ring;Drape;Stoma Powder 06/17/22 1400   Dressing Cleansing/Solutions Not Applicable 06/17/22 1400   Dressing Options Wound Vac;Mepilex;Puracyn Gel 06/17/22 1400   Dressing Changed Changed 06/17/22 1400   Dressing Change/Treatment Frequency Monday, Wednesday, Friday, and As Needed 05/27/22 1400   Pressure Injury Stage 4 06/17/22 1400   Wound Length (cm) 2.4 cm 06/17/22 1400    Wound Width (cm) 1.5 cm 06/17/22 1400   Wound Depth (cm) 3.4 cm 06/17/22 1400   Wound Surface Area (cm^2) 3.6 cm^2 06/17/22 1400   Wound Volume (cm^3) 12.24 cm^3 06/17/22 1400   Post-Procedure Length (cm) 2.5 cm 06/17/22 1400   Post-Procedure Width (cm) 1.5 cm 06/17/22 1400   Post-Procedure Depth (cm) 3.4 cm 06/17/22 1400   Post-Procedure Surface Area (cm^2) 3.75 cm^2 06/17/22 1400   Post-Procedure Volume (cm^3) 12.75 cm^3 06/17/22 1400   Wound Healing % -410 06/17/22 1400   Tunneling (cm) 0 cm 06/10/22 1441   Tunneling Clock Position of Wound 12 05/03/22 1600   Undermining (cm) 2.2 cm 06/17/22 1400   Undermining of Wound, 1st Location From 12 o'clock;To 3 o'clock 06/17/22 1400   Undermining (cm) - 2nd location 1 cm 06/17/22 1400   Undermining of Wound, 2nd Location From 3 o'clock;To 5 o'clock 06/17/22 1400   Undermining (cm) - 3rd location 3 cm 06/17/22 1400   Undermining of Wound, 3rd Location From 7 o'clock;To 11 o'clock 06/17/22 1400   Wound Odor None 06/17/22 1400   Exposed Structures Fascia 06/17/22 1400   Number of days: 56       Wound 05/20/22 Pressure Injury Right Lower Back (Active)   Wound Image    06/10/22 1441   Site Assessment Pink;Purple 06/17/22 1400   Periwound Assessment Scar tissue;Fragile 06/17/22 1400   Margins Attached edges 06/03/22 1300   Drainage Amount None 06/17/22 1400   Treatments Cleansed;Site care 06/17/22 1400   Wound Cleansing Normal Saline Irrigation 06/17/22 1400   Periwound Protectant Skin Protectant Wipes to Periwound 06/17/22 1400   Dressing Cleansing/Solutions Not Applicable 06/17/22 1400   Dressing Options Mepilex 06/17/22 1400   Dressing Changed Changed 06/03/22 1300   Dressing Change/Treatment Frequency Monday, Wednesday, Friday, and As Needed 05/27/22 1400   Pressure Injury Stage DTPI 06/17/22 1400   Wound Odor None 06/17/22 1400   Exposed Structures None 06/17/22 1400   Number of days: 28         PROCEDURE: Excisional debridement of sacrococcygeal pressure injury  -2%  viscous lidocaine applied topically to wound bed for approximately 5 minutes prior to debridement  -Curette  used to debride wound bed and open closed wound edges.  Excisional debridement was performed to remove devitalized tissue until healthy, bleeding tissue was visualized.  Total area debrided approximately 15 cm², including into wound tracts and undermining.  Bone visible at base of wound (this is not new).  Tissue debrided into the muscle layer.    -Bleeding controlled with manual pressure.    -Wound care completed by wound RN, refer to flowsheet  -Patient tolerated the procedure well, without c/o pain or discomfort.       PROCEDURE: Excisional debridement of left medial lower leg full-thickness wound, and application of biologic  -2% viscous lidocaine applied topically to wound bed for approximately 5 minutes prior to debridement  -Curette used to debride wound bed.  Excisional debridement was performed to remove devitalized tissue until healthy, bleeding tissue was visualized.  Total area debrided 7.98 cm².  Deepest level of debridement was into the muscle layer.  -Bleeding controlled with manual pressure.    -Wound irrigated thoroughly with normal saline  -A 2 x 3 cm piece of epi cord expandable was hydrated with normal saline and applied to the wound bed.  No wastage of product  -Wound care completed by wound RN, refer to flowsheet  -Patient tolerated the procedure well, without c/o pain or discomfort.     No need for debridement of left posterior lower leg wound, this is again resolved.      BIOLOGIC LOG  5/20/2022-first application.  PRODUCT: EpiCord 2 x 3 cm . PRODUCT #[id number]; EXPIRES: 2026-12-01 5/27/2022- second application.  PRODUCT: EpiCordEX 2 x 3 cm . PRODUCT #[id number]; EXPIRES: 2026-09-01    6/3/2022- third application.  PRODUCT: EpiCordEX 2 x 3 cm . PRODUCT #[id number]; EXPIRES: 2026-10-01    6/10/2022- fourth application.  PRODUCT: EpiCordEX 2 x 3 cm . PRODUCT  #[id number]; EXPIRES: 2026-09-01 6/17/2022- fifth application.  PRODUCT: EpiCordEX 2 x 3 cm . PRODUCT #[id number]; EXPIRES: 2027-02-01    Pertinent Labs and Diagnostics:    Labs:     A1c: No results found for: HBA1C       IMAGING: No results found.    VASCULAR STUDIES: No results found.    LAST  WOUND CULTURE:   Lab Results   Component Value Date/Time    CULTRSULT - (A) 05/19/2022 11:00 AM    CULTRSULT Candida tropicalis  10-50,000 cfu/mL   (A) 05/19/2022 11:00 AM          ASSESSMENT AND PLAN:     1. Sacral decubitus ulcer, stage IV ([facility])  Comments: Ulcer first noted in early April 2022 as small open area which quickly enlarged.  This ulcer is present distal from previous sacral ulcer which healed after surgery in January.  Patient has history of flap reconstruction x2 to this area.    6/17/2022: Wound area and depth have not changed since last assessment.  Poor tissue quality within wound bed, odor noted.  Patient has reached out to Dr. Chaves, and was told that he is no longer doing wounds.  Unfortunately, I do not think this wound will progress without surgical intervention.  -Excisional debridement of wound in clinic today, medically necessary to manage bioburden and to promote wound healing.  -Patient to return to clinic weekly for assessment and debridement  -Home health to remove VAC on Monday, start daily quarter strength Dakin moistened gauze.  We will reevaluate next Friday.  Consider hospitalization if no improvement.  -Referral sent to Dr. Browne at [facility].  -Patient would likely benefit from surgical debridement, perhaps vera flow for a few days, and IV antibiotics.  -Pt advised to go to ER for any increased redness, swelling, drainage or odor, or if he develops fever, chills, nausea or vomiting.    Wound care: NPWT at 125 mmHg to accelerate granulation, change 3 times per week.  Home health to start applying Dakin moistened gauze to sacral wound daily, beginning on Monday    2.   Postoperative wound dehiscence, subsequent encounter  Comments: On 4/26 patient underwent irrigation and debridement of multiple compartments of the left lower extremity states for pressure injury, with bone excision, and complex closure of chronic wound using biologic skin substitute.  During postop visit in surgeons office on 5/11, surgical site was noted to be dehisced.  Patient was referred to wound clinic for management.    6/17/2022: Depth of distal wound has decreased since last assessment, more granulation tissue noted.  Imprinting from paste ring noted to periwound tissue.  -Excisional debridement of wound in clinic today, medically necessary to promote wound healing.  -Biologic application, Epicord expandable, to accelerate granulation  -Paste ring was not applied around wound edges this week due to imprinting into patient's skin.  -Patient to return to clinic weekly for assessment, debridement, and additional biologic application and VAC dressing change  -Home health is not to disturb biologic or VAC dressing    Wound care: Epicord expandable biologic to accelerate granulation, Adaptic to keep biologic moist, NPWT as adjunctive therapy      3.  Pressure injury of left leg, stage III  Comments: New pressure injury to left posterior lower leg noted first observed in clinic on 5/12/2022.  Located within scar tissue.    6/17/2022: This wound is again resolved.  -Monitor each clinic visit  -Pressure relieving strategies reviewed.    Wound care: Open to air Tubigrip to manage edema      4.  Pressure injury of deep tissue of right buttock  Comments: First observed in clinic 5/20/2022 6/17/2022: Skin remains intact, redness dissipating.  Essentially resolved    -Monitor each clinic visit    Wound care: Mepilex foam dressing to relieve pressure and friction    5. Quadriplegia, C5-C7, complete (HCC)  Comments: Complicating factor.  Impaired mobility and sensation.    6/172022: Patient states he is up in his  wheelchair approximately 7 hours/day.  He repositions himself every 1/2 hour using mechanical features of his chair.  He has low air loss mattress at home with alternating pressure.  He is very well versed in pressure relieving techniques.      PATIENT EDUCATION  -Etiology of pressure injury  -Importance of offloading and frequent repositioning  -Strategies for offloading in bed and when seated discussed and demonstrated  - Importance of adequate nutrition for wound healing  - Advised to go to ER for any increased redness, swelling, drainage or odor, or if patient develops fever, chills, nausea or vomiting.      My total time spent caring for the patient on the day of the encounter was 20 minutes, reviewing previous hospitalization notes, reviewing surgical notes, reviewing past clinical notes and photos, wound assessment,.   Coordination of care, patient counseling and education.    Please note that this note may have been created using voice recognition software. I have worked with technical experts from [facility] to optimize the interface.  I have made every reasonable attempt to correct obvious errors, but there may be errors of grammar and possibly content that I did not discover before finalizing the note.

## 2022-06-17 NOTE — PROGRESS NOTES
"Orders faxed to [facility]. Per Abby DEA NDA, VAC to be placed on hold on Monday 06/20/2022, HH to perform daily dressings with dakins 1/4 strength moistened gauze Mon - Thurs next week until pt is re-evaluated in clinic by provider on Friday 06/24/2022.  Pt asked if I would change his colostomy appliance as it has been in place for 2 weeks. Old ostomy appliance removed using [employer] medical adhesive remover spray, and by pushing skin away from appliance to avoid skin tears.  [employer] flat 2-piece CTF 70mm appliance with a 2\" adapt cera ring was changed. Framed with Brava strips at pt request.       "

## 2022-06-20 ENCOUNTER — TELEPHONE (OUTPATIENT)
Dept: WOUND CARE | Facility: MEDICAL CENTER | Age: 72
End: 2022-06-20
Payer: MEDICARE

## 2022-06-20 NOTE — TELEPHONE ENCOUNTER
[facility] unable to see Anjum for 4 visits this week. Will see him 3x and change dressing for sacral wound from 1/4 dakins Monday and Tuesday to [facility] on Wednesday. If concerns wound is deteriorating to go to ED. Updated orders faxed to [facility]. Communicated updated plan per Abby DE ANDA with [facility]. Anjum states he understands and no further questions.

## 2022-06-21 ENCOUNTER — TELEPHONE (OUTPATIENT)
Dept: WOUND CARE | Facility: MEDICAL CENTER | Age: 72
End: 2022-06-21
Payer: MEDICARE

## 2022-06-21 DIAGNOSIS — L08.9 WOUND INFECTION: ICD-10-CM

## 2022-06-21 DIAGNOSIS — T14.8XXA WOUND INFECTION: ICD-10-CM

## 2022-06-21 RX ORDER — AMOXICILLIN AND CLAVULANATE POTASSIUM 875; 125 MG/1; MG/1
1 TABLET, FILM COATED ORAL 2 TIMES DAILY
Qty: 20 TABLET | Refills: 0 | Status: SHIPPED | OUTPATIENT
Start: 2022-06-21 | End: 2022-07-01

## 2022-06-21 NOTE — PROGRESS NOTES
Most recent wound culture results received from [facility], scanned into [employer] under media tab.  Rx for Augmentin sent to patient's pharmacy.  I called and spoke with patient directly via telephone, and informed him of Rx.

## 2022-06-24 ENCOUNTER — OFFICE VISIT (OUTPATIENT)
Dept: WOUND CARE | Facility: MEDICAL CENTER | Age: 72
End: 2022-06-24
Attending: INTERNAL MEDICINE
Payer: MEDICARE

## 2022-06-24 VITALS
SYSTOLIC BLOOD PRESSURE: 120 MMHG | OXYGEN SATURATION: 95 % | DIASTOLIC BLOOD PRESSURE: 70 MMHG | RESPIRATION RATE: 18 BRPM | HEART RATE: 63 BPM | TEMPERATURE: 98.2 F

## 2022-06-24 DIAGNOSIS — L89.316 PRESSURE INJURY OF DEEP TISSUE OF RIGHT BUTTOCK: ICD-10-CM

## 2022-06-24 DIAGNOSIS — L89.154 SACRAL DECUBITUS ULCER, STAGE IV (HCC): ICD-10-CM

## 2022-06-24 DIAGNOSIS — G82.53 QUADRIPLEGIA, C5-C7, COMPLETE (HCC): ICD-10-CM

## 2022-06-24 DIAGNOSIS — T81.31XD POSTOPERATIVE WOUND DEHISCENCE, SUBSEQUENT ENCOUNTER: ICD-10-CM

## 2022-06-24 DIAGNOSIS — L89.893 PRESSURE INJURY OF LEFT LEG, STAGE 3 (HCC): ICD-10-CM

## 2022-06-24 PROCEDURE — 97605 NEG PRS WND THER DME<=50SQCM: CPT | Mod: XU

## 2022-06-24 PROCEDURE — 15271 SKIN SUB GRAFT TRNK/ARM/LEG: CPT | Performed by: NURSE PRACTITIONER

## 2022-06-24 PROCEDURE — 99213 OFFICE O/P EST LOW 20 MIN: CPT | Mod: 25

## 2022-06-24 PROCEDURE — 11043 DBRDMT MUSC&/FSCA 1ST 20/<: CPT

## 2022-06-24 PROCEDURE — 11043 DBRDMT MUSC&/FSCA 1ST 20/<: CPT | Mod: 59 | Performed by: NURSE PRACTITIONER

## 2022-06-24 PROCEDURE — C5271 LOW COST SKIN SUBSTITUTE APP: HCPCS

## 2022-06-24 PROCEDURE — 99212 OFFICE O/P EST SF 10 MIN: CPT | Mod: 25 | Performed by: NURSE PRACTITIONER

## 2022-06-24 PROCEDURE — 11042 DBRDMT SUBQ TIS 1ST 20SQCM/<: CPT

## 2022-06-24 ASSESSMENT — ENCOUNTER SYMPTOMS
NAUSEA: 0
COUGH: 0
FEVER: 0
ROS GI COMMENTS: COLOSTOMY IN PLACE
VOMITING: 0
SHORTNESS OF BREATH: 0
CHILLS: 0
CONSTIPATION: 0
DIARRHEA: 0

## 2022-06-24 NOTE — PROGRESS NOTES
Provider Encounter- Pressure Injury        HISTORY OF PRESENT ILLNESS  Wound History:    START OF CARE IN CLINIC: 5/3/2022    REFERRING PROVIDER: Sahara Mendez      WOUNDS- Pressure Injury, Sacrococcygeal, stage IV                                                             Surgical wound dehiscence -status post surgical I&D of stage IV pressure injury and biologic application                      Pressure injury, stage III, left posterior lower leg-first observed in clinic 5/12/2022; resolved 6/3/2022; reopened 6/10/2022; resolved again 6/17/2022                                 Pressure injury, DTI, right buttock/lower back-first observed in clinic 5/20/2022; resolved 6/17/2022     HISTORY: Patient with history of incomplete quadriplegia referred to [facility] for treatment of a stage IV pressure injury.  He has a history of previous pressure injuries to this area, and underwent muscle flaps in 2019, and then again in 2020.  He was seen in the wound clinic in November 2021 for an ulcer proximal from his current ulcer, and pressure injuries to his left posterior lower leg and left heel.  At that time, it was discovered that the patient had retained VAC foam embedded in the wound bed of the sacral wound.  Attempts were made to get him back to his plastic surgeon, though unsuccessful.  In January he underwent surgical removal of VAC sponge along with excisional debridement of his sacral wound by Dr. Chaves.  After the surgery, his wound went on to heal without incident.   In early April 2022, his home health nurse noted a new sacral ulcer, below the previous ulcer which quickly tripled in size over the following weeks.  The ulcer to his left medial lower leg had also deteriorated, with bone visible at the base..  He was hospitalized from 4/22 until 4/27/2022 and underwent surgery with Dr. Raman on 4/26 for irrigation and debridement of multiple compartments of the left lower extremity, bone excision, and complex closure of  chronic wound using biologic skin substitute.   His sacrococcygeal wound was not surgically addressed during this admission.  He was discharged back to his group home, with home health, and referral to outpatient wound clinic for his sacral wound.  He was instructed to follow-up with his surgeon for his lower leg wound.       Postoperatively, the left medial lower leg incision dehisced.  He was seen by his surgeon at [facility] on 5/11.  The surgeon opted to leave remaining sutures in place, and refer him to the wound clinic for treatment of this wound.   Treatment of this wound was initiated in clinic on 5/12.  During this visit was also noted that his heel DTI had resolved, but that he had a new pressure injury to his left posterior lower extremity.     A new pressure injury was noted to patient's right upper buttock/lower back on 5/20/2022.  Wound was linear in shape, skin discolored but intact.    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  Never used smokeless tobacco    Patient's problem list, allergies, and current medications reviewed and updated in Epic    Interval History:  5/3/2022 : Clinic visit with ADILSON Arthur, DAT-BC, CWDINESHN, CFCN.  Patient states he is feeling well overall, denies fevers, chills, nausea, vomiting, cough or shortness of breath.  During his initial assessment, a piece of fenestrated tubing, approximately 6 inches long, 3 to 5 mm in diameter, was identified and removed from his sacral wound.  Patient informed me that after his flap procedure in 2020 it was believed that part of a drain tube was retained within his wound.  The surgeon at the time decided not  to explore the wound.   The surgical dressing was removed from his left lower leg wound.  Steri-Strips in place over biologic.  Patient informed me that he has a follow-up appointment with Dr. Raman a week from tomorrow (5/11).  Patient is under the impression that Dr. Raman will be managing this wound.      5/12/2022 : Clinic visit with ADILSON Arthur, HOLDEN, IMAN, LILIYA.  Anjum states he is feeling very well.  He was seen by Dr. Raman yesterday who was concerned with the appearance of his lower leg wound.  Per Dr. Raman's note, sutures to be left in place for another week.  No residual biologic product noted within wound bed.  We will request authorization to continue.   Patient sacral wound is with significantly less slough than last week.  Discussed with patient treating with VAC.  Given his past history of retained foam, expected him to be resistant to the idea, however he stated he was willing to do what ever necessary to expedite healing of this wound.   Patient is up in his wheelchair 7 to 8 hours/day, has appropriate pressure relief cushion in his chair, and the ability to raise and lower back to his wheelchair mechanically.  He also knows to physically reposition himself every 1/2 hour.  He has a low-air-loss bed on his mattress, with alternating pressure.  He is very well versed on pressure-relieving techniques.    5/20/2022 : Clinic visit with ADILSON Arthur, HOLDEN, IMAN, LILIYA.  Anjum states he is feeling well today.  He understands that the VAC is to be initiated on his sacral wound today.  He verbalizes some worry about possibility of VAC retention, as this was a problem before.  I assured him that we would provide appropriate precautions, and would make sure that we write on the outside of the dressing number of pieces of foam used, and we would be communicating with home health.   We have also been approved for biologic to his left posterior lower leg wound and will be initiating  this today.  Sutures also removed as recommended by Dr. Raman.  The smaller, more superficial wound to his posterior lower leg appears to be crusting adequately    5/27/2022 : Clinic visit with ADILSON Arthur FNP-BC, ILLIYA VALERIO.  Brandon continues to feel well overall.  He has had no problems with the VAC to his sacrum over this past week.  We have received approval to apply VAC to his leg wound also, over the biologic.  Home health will continue to change the sacral VAC dressing 2 times per week in between clinic visits.  They will be instructed not to disturb the VAC and biologic to his leg.  This will be changed in clinic weekly.   The pressure injuries to his left posterior lower leg, and right lower back/upper buttock appear to be resolving.  No need to debride these today.    6/3/2022 : Clinic visit with ADILSON Arthur FNP-BC, LILIYA VALERIO.  Anjum states that he is feeling very well.  He was seen by Dr. Raman's PA earlier today.  VAC was not removed because patient was worried about VAC being off until he was seen in the clinic.  She did review his wound photos in epic.  Patient also informing that his home health nurses were worried about the appearance of his sacral wound last Friday, and cultured the wound, received order from patient's PCP, and sent culture to [facility].  Unfortunately, I do not have access to these results.  Wound did not appear to be infected, no appreciable odor, evidence of new granulation.  The pressure ulcer to his left posterior lower leg has resolved.  DTI to right upper buttock also appears to be resolving.   Patient states he has had problems with frequent notifications from his pump that canister is full, even when it is not.  He has been going through canisters every day or so.  I notified [facility] representative, Mae Mccullough, requesting new pump be delivered, and perhaps a few more canisters.    6/10/2022 : Clinic visit with ADILSON Arthur FNP-BC, LILIYA VALERIO.  Anjum  states that he continues to feel well.  Home health is seeing him 2 times per week in between clinic visits.  [employer] replace his VAC machine last week, he has had no further problems.  He has been taking urgent need as recommended, though admits that he does not take it as often as he should.  He has been trying to eat a high-protein diet.  He has not yet made an appointment with Dr. Chaves for plastic surgery consult.  I recommended that he do so.   He presents today with reopening of posterior left lower leg pressure injury, which had been resolved last week.  Treatment restarted.   He does raise concern today that there is no provider available in clinic on 7/8.  We discussed possibly skipping wound clinic appointment that week, and having home health change of VAC to leg wound on that date.  As we get closer to this date, we will clarify.      6/17/2022 : Clinic visit with ADILSON Arthur, DAT-BC, ALEXN, CFTRACI.  Anjum continues to feel well, tolerating VAC without difficulty.  States he has finally heard back from Dr. Chaves's office, was informed that he is no longer doing wound care, at least through the end of the year.  Patient's sacral wound is not progressing, and in fact presents today with noticeable odor and poor tissue quality.  His leg wound however has improved slightly with decreased depth to distal wound, and increased granulation tissue.  Indentation all around periwound, caused by paste ring.  We opted to not use paste during in clinic today.   Suggested possible hospitalization because of sacral wound.  However, patient was not enthused about this idea.  Puracyn gel applied to wound bed prior to placement of VAC.  Instructions were written for home health to discontinue VAC on Monday and start daily Dakin's moistened gauze in an effort to manage bioburden.   I will attempt to refer patient to Dr. Browne at [facility].  I will also reach out to ID regarding condition of sacral wound.  Patient would likely  benefit from hospitalization for surgical I&D, vera flow VAC for a few days, and possibly IV antibiotics.      6/24/2022 : Clinic visit with Kelly Sandy, ADILSON, FNP-BC, CWOCN, CFCN.   Anjum states that he continues to feel well.  He was contacted by Dr. Browne's office, informed that he would not see him for plastic surgery consult.  Reason provided was that he had had previous flaps which have failed.  Wound odor significantly less today, but appearance of sacral wound bed much improved.  However, depth has not decreased, and bone still exposed.  He cannot undergo MRI because of extensive hardware in his spine and pelvis.  X-ray ordered to assess for OM.  ESR and sed rate also ordered.  Patient thought he could get x-ray and labs drawn by [facility].  I contacted [facility] staff myself.  I was informed that only their providers could decide whether or not he needed x-ray and labs.  Relayed this information to patient, advised him to call and schedule an appointment for his labs and x-ray, and explained to them that he is limited by his mobility and transportation, and thus needs an appointment.   I also discussed with Anjum the possibility that he may require hospitalization at some point if this wound does not progress.  If OM of sacrum confirmed, he may need to go on IV antibiotics.          REVIEW OF SYSTEMS:   Review of Systems   Constitutional: Negative for chills and fever.   Respiratory: Negative for cough and shortness of breath.    Cardiovascular: Negative for chest pain.   Gastrointestinal: Negative for constipation, diarrhea, nausea and vomiting.        Colostomy in place   Genitourinary:        Suprapubic catheter    Musculoskeletal:        Incomplete quadriplegia       PHYSICAL EXAMINATION:   /70 (BP Location: Left arm, Patient Position: Sitting)   Pulse 63   Temp 36.8 °C (98.2 °F)   Resp 18   SpO2 95%   Physical Exam  Constitutional:       Appearance: He is obese.   HENT:      Head:  Normocephalic.   Eyes:      Pupils: Pupils are equal, round, and reactive to light.   Pulmonary:      Effort: Pulmonary effort is normal.   Abdominal:      Palpations: Abdomen is soft.   Skin:     Comments: Coccygeal pressure injury, stage IV     Neurological:      Mental Status: He is alert.         WOUND ASSESSMENT  Wound 04/11/22 Full Thickness Wound Leg Medial Left --Left Medial Lower Leg (Active)   Wound Image   06/24/22 1000   Site Assessment Red;Yellow;White 06/24/22 1000   Periwound Assessment Edema;Fragile 06/24/22 1000   Margins Unattached edges 06/24/22 1000   Drainage Amount Large 06/24/22 1000   Drainage Description Serosanguineous 06/24/22 1000   Treatments Cleansed;Topical Lidocaine;Provider debridement;Site care 06/24/22 1000   Wound Cleansing Puracyn [place] 06/24/22 1000   Periwound Protectant Skin Protectant Wipes to Periwound;Benzoin 06/24/22 1000   Dressing Cleansing/Solutions Normal Saline 06/24/22 1000   Dressing Options Biologic (Skin Substitute);Adaptic;Steri-Strip;Wound Vac;Tubigrip 06/24/22 1000   Dressing Changed Changed 06/24/22 1000   Dressing Status Clean;Dry;Intact 04/11/22 1330   Dressing Change/Treatment Frequency Monday, Wednesday, Friday, and As Needed 05/27/22 1400   Non-staged Wound Description Full thickness 06/24/22 1000   Wound Length (cm) 3 cm 06/24/22 1000   Wound Width (cm) 1.5 cm 06/24/22 1000   Wound Depth (cm) 0.9 cm 06/24/22 1000   Wound Surface Area (cm^2) 4.5 cm^2 06/24/22 1000   Wound Volume (cm^3) 4.05 cm^3 06/24/22 1000   Post-Procedure Length (cm) 4.2 cm 06/17/22 1400   Post-Procedure Width (cm) 1.9 cm 06/17/22 1400   Post-Procedure Depth (cm) 1.8 cm 06/17/22 1400   Post-Procedure Surface Area (cm^2) 7.98 cm^2 06/17/22 1400   Post-Procedure Volume (cm^3) 14.364 cm^3 06/17/22 1400   Wound Healing % -8338 06/24/22 1000   Tunneling (cm) 1.4 cm 06/24/22 1000   Tunneling Clock Position of Wound 12 06/24/22 1000   Undermining (cm) 0 cm 06/24/22 1000   Undermining of  Wound, 1st Location From 2 o'clock;To 4 o'clock 06/03/22 1300   Wound Odor None 06/24/22 1000   Exposed Structures None 06/24/22 1000   Number of days: 74       Wound 04/22/22 Pressure Injury Sacrum POA stage 4 (Active)   Wound Image    06/24/22 1000   Site Assessment Red;Yellow 06/24/22 1000   Periwound Assessment Scar tissue 06/24/22 1000   Margins Unattached edges 06/24/22 1000   Drainage Amount Large 06/24/22 1000   Drainage Description Serosanguineous 06/24/22 1000   Treatments Cleansed;Provider debridement;Site care 06/24/22 1000   Wound Cleansing Puracyn [place] 06/24/22 1000   Periwound Protectant Skin Protectant Wipes to Periwound;Benzoin;Paste Ring;Drape 06/24/22 1000   Dressing Cleansing/Solutions Not Applicable 06/24/22 1000   Dressing Options Puracyn Gel;Wound Vac;Mepilex 06/24/22 1000   Dressing Changed Changed 06/24/22 1000   Dressing Change/Treatment Frequency Monday, Wednesday, Friday, and As Needed 05/27/22 1400   Pressure Injury Stage 4 06/17/22 1400   Wound Length (cm) 3 cm 06/24/22 1000   Wound Width (cm) 1.4 cm 06/24/22 1000   Wound Depth (cm) 2.7 cm 06/24/22 1000   Wound Surface Area (cm^2) 4.2 cm^2 06/24/22 1000   Wound Volume (cm^3) 11.34 cm^3 06/24/22 1000   Post-Procedure Length (cm) 3.1 cm 06/24/22 1000   Post-Procedure Width (cm) 1.4 cm 06/24/22 1000   Post-Procedure Depth (cm) 2.7 cm 06/24/22 1000   Post-Procedure Surface Area (cm^2) 4.34 cm^2 06/24/22 1000   Post-Procedure Volume (cm^3) 11.718 cm^3 06/24/22 1000   Wound Healing % -373 06/24/22 1000   Tunneling (cm) 0 cm 06/10/22 1441   Tunneling Clock Position of Wound 12 05/03/22 1600   Undermining (cm) 3 cm 06/24/22 1000   Undermining of Wound, 1st Location From 12 o'clock;To 3 o'clock 06/24/22 1000   Undermining (cm) - 2nd location 1 cm 06/24/22 1000   Undermining of Wound, 2nd Location To 5 o'clock;From 3 o'clock 06/24/22 1000   Undermining (cm) - 3rd location 3.2 cm 06/24/22 1000   Undermining of Wound, 3rd Location From 7  o'clock;To 11 o'clock 06/24/22 1000   Wound Odor None 06/24/22 1000   Exposed Structures Bone 06/24/22 1000   Number of days: 63       Wound 05/20/22 Pressure Injury Right Lower Back (Active)   Wound Image   06/24/22 1000   Site Assessment [place] 06/24/22 1000   Periwound Assessment Scar tissue;Fragile 06/24/22 1000   Margins Attached edges 06/24/22 1000   Drainage Amount None 06/24/22 1000   Treatments Cleansed;Site care 06/24/22 1000   Wound Cleansing Puracyn [place] 06/24/22 1000   Periwound Protectant Skin Protectant Wipes to Periwound;Barrier Paste 06/24/22 1000   Dressing Cleansing/Solutions Not Applicable 06/24/22 1000   Dressing Options Mepilex 06/24/22 1000   Dressing Changed New 06/24/22 1000   Dressing Change/Treatment Frequency Monday, Wednesday, Friday, and As Needed 05/27/22 1400   Pressure Injury Stage DTPI 06/17/22 1400   Wound Length (cm) 0.3 cm 06/24/22 1000   Wound Width (cm) 0.7 cm 06/24/22 1000   Wound Depth (cm) 0 cm 06/24/22 1000   Wound Surface Area (cm^2) 0.21 cm^2 06/24/22 1000   Wound Volume (cm^3) 0 cm^3 06/24/22 1000   Tunneling (cm) 0 cm 06/24/22 1000   Undermining (cm) 0 cm 06/24/22 1000   Wound Odor None 06/24/22 1000   Exposed Structures None 06/24/22 1000   Number of days: 35         PROCEDURE: Excisional debridement of sacrococcygeal pressure injury  -2% viscous lidocaine applied topically to wound bed for approximately 5 minutes prior to debridement  -Curette  used to debride wound bed and open closed wound edges.  Excisional debridement was performed to remove devitalized tissue until healthy, bleeding tissue was visualized.  Total area debrided approximately 12 cm², including into wound tracts and undermining.  Bone visible at base of wound (this is not new).  Tissue debrided into the muscle layer.    -Bleeding controlled with manual pressure.    -Wound care completed by wound RN, refer to flowsheet  -Patient tolerated the procedure well, without c/o pain or discomfort.        PROCEDURE: Excisional debridement of left medial lower leg full-thickness wound, and application of biologic  -2% viscous lidocaine applied topically to wound bed for approximately 5 minutes prior to debridement  -Curette used to debride wound bed.  Excisional debridement was performed to remove devitalized tissue until healthy, bleeding tissue was visualized.  Total area debrided approximately 8 cm² (post measurements and photo were not taken today).  Deepest level of debridement was into the muscle layer.  -Bleeding controlled with manual pressure.    -Wound irrigated thoroughly with normal saline  -A small piece was cut from a 2 x 3 piece of epi cord expandable, hydrated and forced into wound tract at 12:00  -The remaining Epicort expandable was then hydrated with normal saline and applied to the wound bed.  No wastage of product  -Wound care completed by wound RN, refer to flowsheet  -Patient tolerated the procedure well, without c/o pain or discomfort.     No need for debridement of left posterior lower leg wound, this is again resolved.      BIOLOGIC LOG  5/20/2022-first application.  PRODUCT: EpiCord 2 x 3 cm . PRODUCT #[id number]; EXPIRES: 2026-12-01 5/27/2022- second application.  PRODUCT: EpiCordEX 2 x 3 cm . PRODUCT #[id number]; EXPIRES: 2026-09-01    6/3/2022- third application.  PRODUCT: EpiCordEX 2 x 3 cm . PRODUCT #[id number]; EXPIRES: 2026-10-01    6/10/2022- fourth application.  PRODUCT: EpiCordEX 2 x 3 cm . PRODUCT #[id number]; EXPIRES: 2026-09-01 6/17/2022- fifth application.  PRODUCT: EpiCordEX 2 x 3 cm . PRODUCT #EX [id number]; EXPIRES: 2027-02-01 6/24/2022- sixth application.  PRODUCT: EpiCordEX 2 x 3 cm . PRODUCT #EX [id number]; EXPIRES: 2027-02-01    Pertinent Labs and Diagnostics:    Labs:     A1c: No results found for: HBA1C       IMAGING: No results found.    VASCULAR STUDIES: No results found.    LAST  WOUND CULTURE:   Lab Results    Component Value Date/Time    CULTRSULT - (A) 05/19/2022 11:00 AM    CULTRSULT Candida tropicalis  10-50,000 cfu/mL   (A) 05/19/2022 11:00 AM          ASSESSMENT AND PLAN:     1. Sacral decubitus ulcer, stage IV (HCC)  Comments: Ulcer first noted in early April 2022 as small open area which quickly enlarged.  This ulcer is present distal from previous sacral ulcer which healed after surgery in January.  Patient has history of flap reconstruction x2 to this area.    6/24/2022: Wound area and depth have not changed significantly since last assessment, however odor significantly decreased, and quality of tissue in wound bed much improved.    -Excisional debridement of wound in clinic today, medically necessary to promote wound healing  -Patient to return to clinic weekly for assessment and debridement  -Continue with VAC  -Dr. Browne, plastic surgery, has declined referral  -X-ray, ESR, and CRP ordered to assess for OM.  Patient cannot have MRI due to extensive hardware in spine and pelvis.  May need to consider CT if x-ray inconclusive  -Pt advised to go to ER for any increased redness, swelling, drainage or odor, or if he develops fever, chills, nausea or vomiting.    Wound care: NPWT at 125 mmHg to accelerate granulation, change 3 times per week.      2.  Postoperative wound dehiscence, subsequent encounter  Comments: On 4/26 patient underwent irrigation and debridement of multiple compartments of the left lower extremity states for pressure injury, with bone excision, and complex closure of chronic wound using biologic skin substitute.  During postop visit in surgeons office on 5/11, surgical site was noted to be dehisced.  Patient was referred to wound clinic for management.    6/24/2022: Total wound area and depth have decreased.  -Excisional debridement of wound in clinic today, medically necessary to promote wound healing.  -Biologic application, Epicord expandable, to accelerate granulation  -Paste ring was  not applied around wound edges this week due to imprinting into patient's skin.  -Patient to return to clinic weekly for assessment, debridement, and additional biologic application and VAC dressing change  -Home health is not to disturb biologic or VAC dressing this week.  However, because there is no provider in the clinic on 7/8, home health will need to change VAC dressing to this wound, and then again the following Monday 7/11, and Wednesday 7/13.  Patient will then be seen again in clinic on 7/15    Wound care: Epicord expandable biologic to accelerate granulation, Adaptic to keep biologic moist, NPWT as adjunctive therapy      3.  Pressure injury of left leg, stage III  Comments: New pressure injury to left posterior lower leg noted first observed in clinic on 5/12/2022.  Located within scar tissue.    6/24/2022: This wound remains resolved.  Has reopened previously, bears watching.  -Monitor each clinic visit      Wound care: Open to air      4.  Pressure injury of deep tissue of right buttock  Comments: First observed in clinic 5/20/2022 6/24/2022: Skin remains intact, discoloration dissipating.  Resolved    -Monitor each clinic visit    Wound care: Open to air    5. Quadriplegia, C5-C7, complete (HCC)  Comments: Complicating factor.  Impaired mobility and sensation.    6/24/2022: Patient has appropriate offloading in his wheelchair, low-air-loss mattress with alternating pressure.  He is well versed on pressure relieving strategies.      PATIENT EDUCATION  -Etiology of pressure injury  -Importance of offloading and frequent repositioning  -Strategies for offloading in bed and when seated discussed and demonstrated  - Importance of adequate nutrition for wound healing  - Advised to go to ER for any increased redness, swelling, drainage or odor, or if patient develops fever, chills, nausea or vomiting.      My total time spent caring for the patient on the day of the encounter was 20 minutes, assessing  wounds, ordering appropriate diagnostics and labs, [facility] see if [facility] and labs agreed on to them, educating patient, assessment, debridement, and biologic application    Please note that this note may have been created using voice recognition software. I have worked with technical experts from [facility] to optimize the interface.  I have made every reasonable attempt to correct obvious errors, but there may be errors of grammar and possibly content that I did not discover before finalizing the note.

## 2022-06-24 NOTE — PATIENT INSTRUCTIONS
-Keep your wound dressing clean, dry, and intact.    -Change your dressing if it becomes soiled, soaked, or falls off.    -Wound vac may not have any drainage in tube or cannister & it will still be working.   Change cannister if it does become full by pressing tab on side of machine to remove canister and snap on new one. Full canister can be thrown in the trash. If cannister fills with bright red blood - go to ER. Dressing will be changed every MWF at the wound clini.  If you are having issues with your wound VAC, please consider patching leaks, changing the canister, or calling [phone] for troubleshooting. If the wound VAC has been off or un-operational for over 2 hours, call wound care center to inform them and remove all dressings including black foam and replace with normal saline damp gauze.     -Should you experience any significant changes in your wound(s), such as infection (redness, swelling, localized heat, increased pain, fever > 101 F, chills) or have any questions regarding your home care instructions, please contact the wound center at [phone]. If after hours, contact your primary care physician or go to the hospital emergency room.

## 2022-06-24 NOTE — PROGRESS NOTES
"While assisting with hoya transfer today pt asked if I would change his colostomy appliance. Old ostomy appliance removed using [employer] medical adhesive remover spray, and by pushing skin away from appliance to avoid skin tears.  Kelsey flat 2-piece CTF 70mm appliance with a 2\" adapt cera ring was changed. Framed with Brava strips at pt request.      "

## 2022-06-27 ENCOUNTER — TELEPHONE (OUTPATIENT)
Dept: WOUND CARE | Facility: MEDICAL CENTER | Age: 72
End: 2022-06-27
Payer: MEDICARE

## 2022-06-27 NOTE — TELEPHONE ENCOUNTER
Request from [employer] to fax orders for labs to [facility] and xray to [facility] faxed to [phone]. Completed by this writer. Also put note on faxed to send results to [facility]  Wound  [facility].

## 2022-07-01 ENCOUNTER — OFFICE VISIT (OUTPATIENT)
Dept: WOUND CARE | Facility: MEDICAL CENTER | Age: 72
End: 2022-07-01
Attending: INTERNAL MEDICINE
Payer: MEDICARE

## 2022-07-01 VITALS
DIASTOLIC BLOOD PRESSURE: 48 MMHG | TEMPERATURE: 97.4 F | RESPIRATION RATE: 18 BRPM | HEART RATE: 48 BPM | SYSTOLIC BLOOD PRESSURE: 104 MMHG

## 2022-07-01 DIAGNOSIS — L89.893 PRESSURE INJURY OF LEFT LEG, STAGE 3 (HCC): ICD-10-CM

## 2022-07-01 DIAGNOSIS — G82.53 QUADRIPLEGIA, C5-C7, COMPLETE (HCC): ICD-10-CM

## 2022-07-01 DIAGNOSIS — T14.8XXA WOUND INFECTION: ICD-10-CM

## 2022-07-01 DIAGNOSIS — T81.31XD POSTOPERATIVE WOUND DEHISCENCE, SUBSEQUENT ENCOUNTER: ICD-10-CM

## 2022-07-01 DIAGNOSIS — L08.9 WOUND INFECTION: ICD-10-CM

## 2022-07-01 DIAGNOSIS — L89.154 SACRAL DECUBITUS ULCER, STAGE IV (HCC): Primary | ICD-10-CM

## 2022-07-01 DIAGNOSIS — L89.316 PRESSURE INJURY OF DEEP TISSUE OF RIGHT BUTTOCK: ICD-10-CM

## 2022-07-01 PROCEDURE — 15271 SKIN SUB GRAFT TRNK/ARM/LEG: CPT | Performed by: NURSE PRACTITIONER

## 2022-07-01 PROCEDURE — 11043 DBRDMT MUSC&/FSCA 1ST 20/<: CPT

## 2022-07-01 PROCEDURE — 99213 OFFICE O/P EST LOW 20 MIN: CPT

## 2022-07-01 PROCEDURE — 15271 SKIN SUB GRAFT TRNK/ARM/LEG: CPT

## 2022-07-01 RX ORDER — AMOXICILLIN AND CLAVULANATE POTASSIUM 875; 125 MG/1; MG/1
1 TABLET, FILM COATED ORAL 2 TIMES DAILY
Qty: 14 TABLET | Refills: 0 | Status: SHIPPED | OUTPATIENT
Start: 2022-07-01 | End: 2022-07-08

## 2022-07-01 ASSESSMENT — ENCOUNTER SYMPTOMS
DIARRHEA: 0
SHORTNESS OF BREATH: 0
VOMITING: 0
CHILLS: 0
CONSTIPATION: 0
COUGH: 0
NAUSEA: 0
FEVER: 0
ROS GI COMMENTS: COLOSTOMY IN PLACE

## 2022-07-01 NOTE — PROGRESS NOTES
Provider Encounter- Pressure Injury        HISTORY OF PRESENT ILLNESS  Wound History:    START OF CARE IN CLINIC: 5/3/2022    REFERRING PROVIDER: Sahara Mendez      WOUNDS- Pressure Injury, Sacrococcygeal, stage IV                                                             Surgical wound dehiscence -status post surgical I&D of stage IV pressure injury and biologic application                      Pressure injury, stage III, left posterior lower leg-first observed in clinic 5/12/2022; resolved 6/3/2022; reopened 6/10/2022; resolved again 6/17/2022                                 Pressure injury, DTI, right buttock/lower back-first observed in clinic 5/20/2022; resolved 6/17/2022     HISTORY: Patient with history of incomplete quadriplegia referred to [facility] for treatment of a stage IV pressure injury.  He has a history of previous pressure injuries to this area, and underwent muscle flaps in 2019, and then again in 2020.  He was seen in the wound clinic in November 2021 for an ulcer proximal from his current ulcer, and pressure injuries to his left posterior lower leg and left heel.  At that time, it was discovered that the patient had retained VAC foam embedded in the wound bed of the sacral wound.  Attempts were made to get him back to his plastic surgeon, though unsuccessful.  In January he underwent surgical removal of VAC sponge along with excisional debridement of his sacral wound by Dr. Chaves.  After the surgery, his wound went on to heal without incident.   In early April 2022, his home health nurse noted a new sacral ulcer, below the previous ulcer which quickly tripled in size over the following weeks.  The ulcer to his left medial lower leg had also deteriorated, with bone visible at the base..  He was hospitalized from 4/22 until 4/27/2022 and underwent surgery with Dr. Raman on 4/26 for irrigation and debridement of multiple compartments of the left lower extremity, bone excision, and complex closure of  chronic wound using biologic skin substitute.   His sacrococcygeal wound was not surgically addressed during this admission.  He was discharged back to his group home, with home health, and referral to outpatient wound clinic for his sacral wound.  He was instructed to follow-up with his surgeon for his lower leg wound.       Postoperatively, the left medial lower leg incision dehisced.  He was seen by his surgeon at [facility] on 5/11.  The surgeon opted to leave remaining sutures in place, and refer him to the wound clinic for treatment of this wound.   Treatment of this wound was initiated in clinic on 5/12.  During this visit was also noted that his heel DTI had resolved, but that he had a new pressure injury to his left posterior lower extremity.     A new pressure injury was noted to patient's right upper buttock/lower back on 5/20/2022.  Wound was linear in shape, skin discolored but intact.    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  Never used smokeless tobacco    Patient's problem list, allergies, and current medications reviewed and updated in Epic    Interval History:  5/3/2022 : Clinic visit with ADISLON Arthur, DAT-BC, CWDINESHN, CFCN.  Patient states he is feeling well overall, denies fevers, chills, nausea, vomiting, cough or shortness of breath.  During his initial assessment, a piece of fenestrated tubing, approximately 6 inches long, 3 to 5 mm in diameter, was identified and removed from his sacral wound.  Patient informed me that after his flap procedure in 2020 it was believed that part of a drain tube was retained within his wound.  The surgeon at the time decided not  to explore the wound.   The surgical dressing was removed from his left lower leg wound.  Steri-Strips in place over biologic.  Patient informed me that he has a follow-up appointment with Dr. Raman a week from tomorrow (5/11).  Patient is under the impression that Dr. Raman will be managing this wound.      5/12/2022 : Clinic visit with ADILSON Arthur, HOLDEN, IMAN, LILIYA.  Anjum states he is feeling very well.  He was seen by Dr. Raman yesterday who was concerned with the appearance of his lower leg wound.  Per Dr. Raman's note, sutures to be left in place for another week.  No residual biologic product noted within wound bed.  We will request authorization to continue.   Patient sacral wound is with significantly less slough than last week.  Discussed with patient treating with VAC.  Given his past history of retained foam, expected him to be resistant to the idea, however he stated he was willing to do what ever necessary to expedite healing of this wound.   Patient is up in his wheelchair 7 to 8 hours/day, has appropriate pressure relief cushion in his chair, and the ability to raise and lower back to his wheelchair mechanically.  He also knows to physically reposition himself every 1/2 hour.  He has a low-air-loss bed on his mattress, with alternating pressure.  He is very well versed on pressure-relieving techniques.    5/20/2022 : Clinic visit with ADILSON Arthur, HOLDEN, IMAN, LILIYA.  Anjum states he is feeling well today.  He understands that the VAC is to be initiated on his sacral wound today.  He verbalizes some worry about possibility of VAC retention, as this was a problem before.  I assured him that we would provide appropriate precautions, and would make sure that we write on the outside of the dressing number of pieces of foam used, and we would be communicating with home health.   We have also been approved for biologic to his left posterior lower leg wound and will be initiating  this today.  Sutures also removed as recommended by Dr. Raman.  The smaller, more superficial wound to his posterior lower leg appears to be crusting adequately    5/27/2022 : Clinic visit with ADILSON Arthur FNP-BC, LILIYA VALERIO.  Brandon continues to feel well overall.  He has had no problems with the VAC to his sacrum over this past week.  We have received approval to apply VAC to his leg wound also, over the biologic.  Home health will continue to change the sacral VAC dressing 2 times per week in between clinic visits.  They will be instructed not to disturb the VAC and biologic to his leg.  This will be changed in clinic weekly.   The pressure injuries to his left posterior lower leg, and right lower back/upper buttock appear to be resolving.  No need to debride these today.    6/3/2022 : Clinic visit with ADILSON Arthur FNP-BC, LILIYA VALERIO.  Anjum states that he is feeling very well.  He was seen by Dr. Raman's PA earlier today.  VAC was not removed because patient was worried about VAC being off until he was seen in the clinic.  She did review his wound photos in epic.  Patient also informing that his home health nurses were worried about the appearance of his sacral wound last Friday, and cultured the wound, received order from patient's PCP, and sent culture to [facility].  Unfortunately, I do not have access to these results.  Wound did not appear to be infected, no appreciable odor, evidence of new granulation.  The pressure ulcer to his left posterior lower leg has resolved.  DTI to right upper buttock also appears to be resolving.   Patient states he has had problems with frequent notifications from his pump that canister is full, even when it is not.  He has been going through canisters every day or so.  I notified [facility] representative, Mae Mccullough, requesting new pump be delivered, and perhaps a few more canisters.    6/10/2022 : Clinic visit with ADILSON Arthur FNP-BC, LILIYA VALERIO.  Anjum  states that he continues to feel well.  Home health is seeing him 2 times per week in between clinic visits.  [employer] replace his VAC machine last week, he has had no further problems.  He has been taking urgent need as recommended, though admits that he does not take it as often as he should.  He has been trying to eat a high-protein diet.  He has not yet made an appointment with Dr. Chaves for plastic surgery consult.  I recommended that he do so.   He presents today with reopening of posterior left lower leg pressure injury, which had been resolved last week.  Treatment restarted.   He does raise concern today that there is no provider available in clinic on 7/8.  We discussed possibly skipping wound clinic appointment that week, and having home health change of VAC to leg wound on that date.  As we get closer to this date, we will clarify.      6/17/2022 : Clinic visit with ADILSON Arthur, DAT-BC, ALEXN, CFTRACI.  Anjum continues to feel well, tolerating VAC without difficulty.  States he has finally heard back from Dr. Chaves's office, was informed that he is no longer doing wound care, at least through the end of the year.  Patient's sacral wound is not progressing, and in fact presents today with noticeable odor and poor tissue quality.  His leg wound however has improved slightly with decreased depth to distal wound, and increased granulation tissue.  Indentation all around periwound, caused by paste ring.  We opted to not use paste during in clinic today.   Suggested possible hospitalization because of sacral wound.  However, patient was not enthused about this idea.  Puracyn gel applied to wound bed prior to placement of VAC.  Instructions were written for home health to discontinue VAC on Monday and start daily Dakin's moistened gauze in an effort to manage bioburden.   I will attempt to refer patient to Dr. Browne at [facility].  I will also reach out to ID regarding condition of sacral wound.  Patient would likely  benefit from hospitalization for surgical I&D, vera flow VAC for a few days, and possibly IV antibiotics.      6/24/2022 : Clinic visit with ADILSON Arthur, FNP-BC, CWOCN, CFCN.   Anjum states that he continues to feel well.  He was contacted by Dr. Browne's office, informed that he would not see him for plastic surgery consult.  Reason provided was that he had had previous flaps which have failed.  Wound odor significantly less today, but appearance of sacral wound bed much improved.  However, depth has not decreased, and bone still exposed.  He cannot undergo MRI because of extensive hardware in his spine and pelvis.  X-ray ordered to assess for OM.  ESR and sed rate also ordered.  Patient thought he could get x-ray and labs drawn by [facility].  I contacted [facility] staff myself.  I was informed that only their providers could decide whether or not he needed x-ray and labs.  Relayed this information to patient, advised him to call and schedule an appointment for his labs and x-ray, and explained to them that he is limited by his mobility and transportation, and thus needs an appointment.   I also discussed with Anjum the possibility that he may require hospitalization at some point if this wound does not progress.  If OM of sacrum confirmed, he may need to go on IV antibiotics.    7/1/2022: Clinic visit with ADILSON Flores.  1 view of the pelvis performed on 6/28 did not show any evidence of osteomyelitis.  However, the patient has 3 areas of exposed bone to the area which is clinically defined as osteomyelitis.  The patient's left medial lower extremity wound has degraded with boggy fluctuant tissue to the superior aspect of the wound.  We will request lab work from [facility] for recent ESR and sed rate.       REVIEW OF SYSTEMS:   Review of Systems   Constitutional: Negative for chills and fever.   Respiratory: Negative for cough and shortness of breath.    Cardiovascular: Negative for chest  pain.   Gastrointestinal: Negative for constipation, diarrhea, nausea and vomiting.        Colostomy in place   Genitourinary:        Suprapubic catheter    Musculoskeletal:        Incomplete quadriplegia       PHYSICAL EXAMINATION:   /48 (BP Location: Left arm, Patient Position: Sitting) Comment: RN notified  Pulse (!) 48   Temp 36.3 °C (97.4 °F)   Resp 18   Physical Exam  Constitutional:       Appearance: He is obese.   HENT:      Head: Normocephalic.   Eyes:      Pupils: Pupils are equal, round, and reactive to light.   Cardiovascular:      Rate and Rhythm: Bradycardia present.   Pulmonary:      Effort: Pulmonary effort is normal. No respiratory distress.      Breath sounds: No wheezing.   Abdominal:      Palpations: Abdomen is soft.   Skin:     Comments: Coccygeal pressure injury, stage IV  Full-thickness wound to left medial lower extremity with boggy fluctuant tissue to the superior aspect   Neurological:      Mental Status: He is alert and oriented to person, place, and time.         WOUND ASSESSMENT  Wound 04/11/22 Full Thickness Wound Leg Medial Left --Left Medial Lower Leg (Active)   Wound Image    07/01/22 1400   Site Assessment Red;Yellow;White 07/01/22 1400   Periwound Assessment Edema;Fragile 07/01/22 1400   Margins Unattached edges 07/01/22 1400   Drainage Amount Moderate 07/01/22 1400   Drainage Description Serosanguineous 07/01/22 1400   Treatments Cleansed;Provider debridement 07/01/22 1400   Wound Cleansing Puracyn [place] 07/01/22 1400   Periwound Protectant Benzoin;Drape;Paste Ring 07/01/22 1400   Dressing Cleansing/Solutions Not Applicable 07/01/22 1400   Dressing Options Biologic (Skin Substitute);Wound Vac 07/01/22 1400   Dressing Changed Changed 07/01/22 1400   Dressing Status Clean;Dry;Intact 04/11/22 1330   Dressing Change/Treatment Frequency Monday, Wednesday, Friday, and As Needed 05/27/22 1400   Non-staged Wound Description Full thickness 07/01/22 1400   Wound Length (cm) 3.4 cm  07/01/22 1400   Wound Width (cm) 0.8 cm 07/01/22 1400   Wound Depth (cm) 1.7 cm 07/01/22 1400   Wound Surface Area (cm^2) 2.72 cm^2 07/01/22 1400   Wound Volume (cm^3) 4.624 cm^3 07/01/22 1400   Post-Procedure Length (cm) 3.5 cm 07/01/22 1400   Post-Procedure Width (cm) 0.9 cm 07/01/22 1400   Post-Procedure Depth (cm) 1.7 cm 07/01/22 1400   Post-Procedure Surface Area (cm^2) 3.15 cm^2 07/01/22 1400   Post-Procedure Volume (cm^3) 5.355 cm^3 07/01/22 1400   Wound Healing % -9533 07/01/22 1400   Tunneling (cm) 1.7 cm 07/01/22 1400   Tunneling Clock Position of Wound 12 07/01/22 1400   Undermining (cm) 0 cm 07/01/22 1400   Undermining of Wound, 1st Location From 2 o'clock;To 4 o'clock 06/03/22 1300   Wound Odor None 07/01/22 1400   Exposed Structures None 07/01/22 1400   Number of days: 81       Wound 04/22/22 Pressure Injury Sacrum POA stage 4 (Active)   Wound Image   07/01/22 1400   Site Assessment Red;Yellow 07/01/22 1400   Periwound Assessment Scar tissue 07/01/22 1400   Margins Unattached edges 07/01/22 1400   Drainage Amount Moderate 07/01/22 1400   Drainage Description Serosanguineous 07/01/22 1400   Treatments Cleansed 07/01/22 1400   Wound Cleansing Normal Saline Irrigation 07/01/22 1400   Periwound Protectant Benzoin;Drape;Paste Ring 07/01/22 1400   Dressing Cleansing/Solutions Not Applicable 07/01/22 1400   Dressing Options Wound Vac 07/01/22 1400   Dressing Changed Changed 07/01/22 1400   Dressing Change/Treatment Frequency Monday, Wednesday, Friday, and As Needed 05/27/22 1400   Pressure Injury Stage 4 06/17/22 1400   Wound Length (cm) 2.5 cm 07/01/22 1400   Wound Width (cm) 2.2 cm 07/01/22 1400   Wound Depth (cm) 2.6 cm 07/01/22 1400   Wound Surface Area (cm^2) 5.5 cm^2 07/01/22 1400   Wound Volume (cm^3) 14.3 cm^3 07/01/22 1400   Post-Procedure Length (cm) 3.1 cm 06/24/22 1000   Post-Procedure Width (cm) 1.4 cm 06/24/22 1000   Post-Procedure Depth (cm) 2.7 cm 06/24/22 1000   Post-Procedure Surface Area  (cm^2) 4.34 cm^2 06/24/22 1000   Post-Procedure Volume (cm^3) 11.718 cm^3 06/24/22 1000   Wound Healing % -496 07/01/22 1400   Tunneling (cm) 0 cm 06/10/22 1441   Tunneling Clock Position of Wound 12 05/03/22 1600   Undermining (cm) 2.4 cm 07/01/22 1400   Undermining of Wound, 1st Location From 12 o'clock;To 2 o'clock 07/01/22 1400   Undermining (cm) - 2nd location 3 cm 07/01/22 1400   Undermining of Wound, 2nd Location From 9 o'clock;To 11 o'clock 07/01/22 1400   Undermining (cm) - 3rd location 3.2 cm 06/24/22 1000   Undermining of Wound, 3rd Location From 7 o'clock;To 11 o'clock 06/24/22 1000   Wound Odor None 07/01/22 1400   Exposed Structures Bone 07/01/22 1400   Number of days: 70       Wound 05/20/22 Pressure Injury Right Lower Back (Active)   Wound Image   06/24/22 1000   Site Assessment Pink;Epithelialization 07/01/22 1400   Periwound Assessment Scar tissue;Fragile 07/01/22 1400   Margins Attached edges 06/24/22 1000   Drainage Amount None 06/24/22 1000   Treatments Cleansed;Site care 06/24/22 1000   Wound Cleansing Puracyn [place] 06/24/22 1000   Periwound Protectant Skin Protectant Wipes to Periwound;Barrier Paste 06/24/22 1000   Dressing Cleansing/Solutions Not Applicable 06/24/22 1000   Dressing Options Mepilex 06/24/22 1000   Dressing Changed New 06/24/22 1000   Dressing Change/Treatment Frequency Monday, Wednesday, Friday, and As Needed 05/27/22 1400   Pressure Injury Stage DTPI 06/17/22 1400   Wound Length (cm) 0.3 cm 06/24/22 1000   Wound Width (cm) 0.7 cm 06/24/22 1000   Wound Depth (cm) 0 cm 06/24/22 1000   Wound Surface Area (cm^2) 0.21 cm^2 06/24/22 1000   Wound Volume (cm^3) 0 cm^3 06/24/22 1000   Tunneling (cm) 0 cm 06/24/22 1000   Undermining (cm) 0 cm 06/24/22 1000   Wound Odor None 06/24/22 1000   Exposed Structures None 06/24/22 1000   Number of days: 42         PROCEDURE: No excisional debridement required to the sacrococcygeal pressure injury      PROCEDURE: Excisional debridement of left  medial lower leg full-thickness wound, and application of biologic  -2% viscous lidocaine applied topically to wound bed for approximately 5 minutes prior to debridement  -Curette used to debride wound bed.  Excisional debridement was performed to remove devitalized tissue until healthy, bleeding tissue was visualized.  Total area debrided approximately 3.15 cm². Deepest level of debridement was into the muscle layer.  -Bleeding controlled with manual pressure.    -Wound irrigated thoroughly with normal saline  -Epicord biologic placed to the wound bed following debridement  -Wound care completed by wound RN, refer to flowsheet  -Patient tolerated the procedure well, without c/o pain or discomfort.         BIOLOGIC LOG  5/20/2022-first application.  PRODUCT: EpiCord 2 x 3 cm . PRODUCT #[id number]; EXPIRES: 2026-12-01 5/27/2022- second application.  PRODUCT: EpiCordEX 2 x 3 cm . PRODUCT #[id number]; EXPIRES: 2026-09-01    6/3/2022- third application.  PRODUCT: EpiCordEX 2 x 3 cm . PRODUCT #[id number]; EXPIRES: 2026-10-01    6/10/2022- fourth application.  PRODUCT: EpiCordEX 2 x 3 cm . PRODUCT #[id number]; EXPIRES: 2026-09-01 6/17/2022- fifth application.  PRODUCT: EpiCordEX 2 x 3 cm . PRODUCT #EX [id number]; EXPIRES: 2027-02-01 6/24/2022- sixth application.  PRODUCT: EpiCordEX 2 x 3 cm . PRODUCT #[id number]; EXPIRES: 2027-02-01 07/01/22: Seventh application.  Product: Epicort Dx 2.0 x 3.0 cm reorder number [id number]; product number [id number]; expires 2027-02-01    Pertinent Labs and Diagnostics:    Labs:     A1c: No results found for: HBA1C       IMAGING: No results found.    VASCULAR STUDIES: No results found.    LAST  WOUND CULTURE:   Lab Results   Component Value Date/Time    CULTRSULT - (A) 05/19/2022 11:00 AM    CULTRSULT Candida tropicalis  10-50,000 cfu/mL   (A) 05/19/2022 11:00 AM          ASSESSMENT AND PLAN:     1. Sacral decubitus ulcer, stage  IV (HCC)  Comments: Ulcer first noted in early April 2022 as small open area which quickly enlarged.  This ulcer is present distal from previous sacral ulcer which healed after surgery in January.  Patient has history of flap reconstruction x2 to this area.    07/01/22: Wound is approximately the same dimensions with exposed bone and multiple locations to the sacral wound.  -No excisional debridement required in clinic today.  -Patient to return to clinic weekly for assessment and debridement  -Continue with VAC  -Dr. Browne, plastic surgery, has declined referral  -X-ray was negative for osteomyelitis please see scanned results under media tab.  ESR and sed rate are not currently under media tab.  We will request lab results from [facility]  -Pt advised to go to ER for any increased redness, swelling, drainage or odor, or if he develops fever, chills, nausea or vomiting.    Wound care: NPWT at 125 mmHg to accelerate granulation, change 3 times per week.      2.  Postoperative wound dehiscence, subsequent encounter  Comments: On 4/26 patient underwent irrigation and debridement of multiple compartments of the left lower extremity states for pressure injury, with bone excision, and complex closure of chronic wound using biologic skin substitute.  During postop visit in surgeons office on 5/11, surgical site was noted to be dehisced.  Patient was referred to wound clinic for management.    07/01/22: Superior aspect the wound with boggy fluctuant tissue slightly increased depth.  -Excisional debridement of wound in clinic today, medically necessary to promote wound healing.  -Biologic application, Epicord expandable, to accelerate granulation.  If wound degrades further on next clinical assessment we will hold Biologics.  -Patient placed on a 7-day course of Augmentin  -Patient to return to clinic weekly for assessment, debridement, and additional biologic application and VAC dressing change  -Home health is not to  disturb biologic or VAC dressing this week.  However, because there is no provider in the clinic on 7/8, home health will need to change VAC dressing to this wound, and then again the following Monday 7/11, and Wednesday 7/13.  Patient will then be seen again in clinic on 7/15    Wound care: Epicord expandable biologic to accelerate granulation, Adaptic to keep biologic moist, NPWT as adjunctive therapy      3.  Pressure injury of left leg, stage III  Comments: New pressure injury to left posterior lower leg noted first observed in clinic on 5/12/2022.  Located within scar tissue.    07/01/22: Wound has resolved continues to remain closed  -Monitor each clinic visit      Wound care: Open to air      4.  Pressure injury of deep tissue of right buttock  Comments: First observed in clinic 5/20/2022 07/01/22: Skin remains intact, discoloration dissipating.  Resolved    -Monitor each clinic visit    Wound care: Open to air    5. Quadriplegia, C5-C7, complete (HCC)  Comments: Complicating factor.  Impaired mobility and sensation.    07/01/22: Patient has appropriate offloading in his wheelchair, low-air-loss mattress with alternating pressure.  He is well versed on pressure relieving strategies.      PATIENT EDUCATION  -Etiology of pressure injury  -Importance of offloading and frequent repositioning  -Strategies for offloading in bed and when seated discussed and demonstrated  - Importance of adequate nutrition for wound healing  - Advised to go to ER for any increased redness, swelling, drainage or odor, or if patient develops fever, chills, nausea or vomiting.      Please note that this note may have been created using voice recognition software. I have worked with technical experts from [employer] to optimize the interface.  I have made every reasonable attempt to correct obvious errors, but there may be errors of grammar and possibly content that I did not discover before finalizing the note.

## 2022-07-02 NOTE — PROGRESS NOTES
" Pt asked if I would change his colostomy appliance. Old ostomy appliance removed using [employer] medical adhesive remover spray, and by pushing skin away from appliance to avoid skin tears.  [place] flat 2-piece CTF 70mm appliance with a 2\" adapt cera ring was changed. Framed with Brava strips at pt request.        "

## 2022-07-02 NOTE — PATIENT INSTRUCTIONS
After Visit Summary Wound Care Instructions    Nutrition - Patient instructed increased protein diet unless contraindicated in renal failure (meat, eggs, fish, yogurt, cottage cheese, beans), use of multivitamin with minerals and Arginaid supplementation (check if ok with Primary Care Provider first).    Infection -  instructed signs and symptoms of infection, increased redness and swelling, localized heat over wound and surrounding area/fever/chills/nausea and vomiting, when to call doctor or go to Emergency Room.     Wound VAC may not have any drainage in tube or canister & it will still be working.   Change canister if it does become full by pressing tab on side of machine to remove canister and snap on new one. Full canister can be thrown in the trash. If canister fills with bright red blood, turn machine off and go to Emergency Room immediately. Dressing will be changed every Monday, Wednesday and Friday at the wound clinic.  If you are having issues with your wound VAC, please consider patching leaks, changing the canister, or calling [phone] for troubleshooting. If the wound VAC has been off or un-operational for over 2 hours, call wound care center to inform them and remove all dressings including black foam and replace with normal saline damp gauze, then dry gauze over that, secure with tape.     Qestions - should you have any questions regarding your home care instructions, please contact the wound center at [phone]. If after hours, contact your primary care physician or go to the hospital emergency room.

## 2022-07-05 ENCOUNTER — TELEPHONE (OUTPATIENT)
Dept: WOUND CARE | Facility: MEDICAL CENTER | Age: 72
End: 2022-07-05
Payer: MEDICARE

## 2022-07-05 NOTE — TELEPHONE ENCOUNTER
Requested from [facility] latest results, date given was June 30, 2022. To be faxed to [phone].

## 2022-07-11 NOTE — TELEPHONE ENCOUNTER
Case emailed to Asher in cath lab scheduling.   Chief Complaint   Patient presents with   • Follow-up   • Office Visit     Crohn's Colitis       Leoncio Painting is a 34 year old male presenting with follow up for Crohns    Denies Known Latex allergy.  Medications verified.  Tobacco Hx verified and updated, if changes.  Allergies verified.     Patient would like communication of their results via:    [employer]    Social History     Tobacco Use   Smoking Status Current Some Day Smoker   • Types: Cigars   Smokeless Tobacco Never Used   Tobacco Comment    occasional marajuana

## 2022-07-12 ENCOUNTER — NON-PROVIDER VISIT (OUTPATIENT)
Dept: CARDIOLOGY | Facility: MEDICAL CENTER | Age: 72
End: 2022-07-12
Payer: MEDICARE

## 2022-07-12 PROCEDURE — 93298 REM INTERROG DEV EVAL SCRMS: CPT | Performed by: INTERNAL MEDICINE

## 2022-07-13 NOTE — CARDIAC REMOTE MONITOR - SCAN
Device transmission reviewed. Device demonstrated appropriate function.       Electronically Signed by: Elías Merino M.D.    7/23/2022  3:45 PM

## 2022-07-15 ENCOUNTER — OFFICE VISIT (OUTPATIENT)
Dept: WOUND CARE | Facility: MEDICAL CENTER | Age: 72
End: 2022-07-15
Attending: INTERNAL MEDICINE
Payer: MEDICARE

## 2022-07-15 VITALS
RESPIRATION RATE: 18 BRPM | HEART RATE: 60 BPM | SYSTOLIC BLOOD PRESSURE: 117 MMHG | TEMPERATURE: 98.7 F | OXYGEN SATURATION: 92 % | DIASTOLIC BLOOD PRESSURE: 67 MMHG

## 2022-07-15 DIAGNOSIS — G82.53 QUADRIPLEGIA, C5-C7, COMPLETE (HCC): ICD-10-CM

## 2022-07-15 DIAGNOSIS — L89.893 PRESSURE INJURY OF LEFT LEG, STAGE 3 (HCC): ICD-10-CM

## 2022-07-15 DIAGNOSIS — L89.316 PRESSURE INJURY OF DEEP TISSUE OF RIGHT BUTTOCK: ICD-10-CM

## 2022-07-15 DIAGNOSIS — T81.31XD POSTOPERATIVE WOUND DEHISCENCE, SUBSEQUENT ENCOUNTER: ICD-10-CM

## 2022-07-15 DIAGNOSIS — L89.154 SACRAL DECUBITUS ULCER, STAGE IV (HCC): ICD-10-CM

## 2022-07-15 PROCEDURE — 11043 DBRDMT MUSC&/FSCA 1ST 20/<: CPT | Performed by: NURSE PRACTITIONER

## 2022-07-15 PROCEDURE — 11046 DBRDMT MUSC&/FSCA EA ADDL: CPT | Performed by: NURSE PRACTITIONER

## 2022-07-15 PROCEDURE — 97605 NEG PRS WND THER DME<=50SQCM: CPT

## 2022-07-15 PROCEDURE — 99213 OFFICE O/P EST LOW 20 MIN: CPT | Mod: 25

## 2022-07-15 PROCEDURE — 11043 DBRDMT MUSC&/FSCA 1ST 20/<: CPT

## 2022-07-15 ASSESSMENT — ENCOUNTER SYMPTOMS
DIARRHEA: 0
ROS GI COMMENTS: COLOSTOMY IN PLACE
NAUSEA: 0
COUGH: 0
CHILLS: 0
SHORTNESS OF BREATH: 0
FEVER: 0
CONSTIPATION: 0
VOMITING: 0

## 2022-07-15 NOTE — PATIENT INSTRUCTIONS
-Keep your wound dressing clean, dry, and intact.    -Change your dressing if it becomes soiled, soaked, or falls off.    -Wound vac may not have any drainage in tube or cannister & it will still be working.   Change cannister if it does become full by pressing tab on side of machine to remove canister and snap on new one. Full canister can be thrown in the trash. If cannister fills with bright red blood - go to ER. Dressing will be changed every MWF at the wound clini.  If you are having issues with your wound VAC, please consider patching leaks, changing the canister, or calling [phone] for troubleshooting. If the wound VAC has been off or un-operational for over 2 hours, call wound care center to inform them and remove all dressings including black foam and replace with normal saline damp gauze.     -Should you experience any significant changes in your wound(s), such as infection (redness, swelling, localized heat, increased pain, fever > 101 F, chills) or have any questions regarding your home care instructions, please contact the wound center at [phone]. If after hours, contact your primary care physician or go to the hospital emergency room.

## 2022-07-15 NOTE — PROGRESS NOTES
Provider Encounter- Pressure Injury        HISTORY OF PRESENT ILLNESS  Wound History:    START OF CARE IN CLINIC: 5/3/2022    REFERRING PROVIDER: Sahara Mendez      WOUNDS- Pressure Injury, Sacrococcygeal, stage IV                                                             Surgical wound dehiscence -status post surgical I&D of stage IV pressure injury and biologic application                      Pressure injury, stage III, left posterior lower leg-first observed in clinic 5/12/2022; resolved 6/3/2022; reopened 6/10/2022; resolved again 6/17/2022                                 Pressure injury, DTI, right buttock/lower back-first observed in clinic 5/20/2022; resolved 6/17/2022     HISTORY: Patient with history of incomplete quadriplegia referred to [facility] for treatment of a stage IV pressure injury.  He has a history of previous pressure injuries to this area, and underwent muscle flaps in 2019, and then again in 2020.  He was seen in the wound clinic in November 2021 for an ulcer proximal from his current ulcer, and pressure injuries to his left posterior lower leg and left heel.  At that time, it was discovered that the patient had retained VAC foam embedded in the wound bed of the sacral wound.  Attempts were made to get him back to his plastic surgeon, though unsuccessful.  In January he underwent surgical removal of VAC sponge along with excisional debridement of his sacral wound by Dr. Chaves.  After the surgery, his wound went on to heal without incident.   In early April 2022, his home health nurse noted a new sacral ulcer, below the previous ulcer which quickly tripled in size over the following weeks.  The ulcer to his left medial lower leg had also deteriorated, with bone visible at the base..  He was hospitalized from 4/22 until 4/27/2022 and underwent surgery with Dr. Raman on 4/26 for irrigation and debridement of multiple compartments of the left lower extremity, bone excision, and complex closure of  chronic wound using biologic skin substitute.   His sacrococcygeal wound was not surgically addressed during this admission.  He was discharged back to his group home, with home health, and referral to outpatient wound clinic for his sacral wound.  He was instructed to follow-up with his surgeon for his lower leg wound.       Postoperatively, the left medial lower leg incision dehisced.  He was seen by his surgeon at [facility] on 5/11.  The surgeon opted to leave remaining sutures in place, and refer him to the wound clinic for treatment of this wound.   Treatment of this wound was initiated in clinic on 5/12.  During this visit was also noted that his heel DTI had resolved, but that he had a new pressure injury to his left posterior lower extremity.     A new pressure injury was noted to patient's right upper buttock/lower back on 5/20/2022.  Wound was linear in shape, skin discolored but intact.    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  Never used smokeless tobacco    Patient's problem list, allergies, and current medications reviewed and updated in Epic    Interval History:  5/3/2022 : Clinic visit with ADILSON Arthur, DAT-BC, CWDINESHN, CFCN.  Patient states he is feeling well overall, denies fevers, chills, nausea, vomiting, cough or shortness of breath.  During his initial assessment, a piece of fenestrated tubing, approximately 6 inches long, 3 to 5 mm in diameter, was identified and removed from his sacral wound.  Patient informed me that after his flap procedure in 2020 it was believed that part of a drain tube was retained within his wound.  The surgeon at the time decided not  to explore the wound.   The surgical dressing was removed from his left lower leg wound.  Steri-Strips in place over biologic.  Patient informed me that he has a follow-up appointment with Dr. Raman a week from tomorrow (5/11).  Patient is under the impression that Dr. Raman will be managing this wound.      5/12/2022 : Clinic visit with ADILSON Arthur, HOLDEN, IMAN, LILIYA.  Anjum states he is feeling very well.  He was seen by Dr. Raman yesterday who was concerned with the appearance of his lower leg wound.  Per Dr. Raman's note, sutures to be left in place for another week.  No residual biologic product noted within wound bed.  We will request authorization to continue.   Patient sacral wound is with significantly less slough than last week.  Discussed with patient treating with VAC.  Given his past history of retained foam, expected him to be resistant to the idea, however he stated he was willing to do what ever necessary to expedite healing of this wound.   Patient is up in his wheelchair 7 to 8 hours/day, has appropriate pressure relief cushion in his chair, and the ability to raise and lower back to his wheelchair mechanically.  He also knows to physically reposition himself every 1/2 hour.  He has a low-air-loss bed on his mattress, with alternating pressure.  He is very well versed on pressure-relieving techniques.    5/20/2022 : Clinic visit with ADILSON Arthur, HOLDEN, IMAN, LILIYA.  Anjum states he is feeling well today.  He understands that the VAC is to be initiated on his sacral wound today.  He verbalizes some worry about possibility of VAC retention, as this was a problem before.  I assured him that we would provide appropriate precautions, and would make sure that we write on the outside of the dressing number of pieces of foam used, and we would be communicating with home health.   We have also been approved for biologic to his left posterior lower leg wound and will be initiating  this today.  Sutures also removed as recommended by Dr. Raman.  The smaller, more superficial wound to his posterior lower leg appears to be crusting adequately    5/27/2022 : Clinic visit with ADILSON Arthur FNP-BC, LILIYA VALERIO.  Brandon continues to feel well overall.  He has had no problems with the VAC to his sacrum over this past week.  We have received approval to apply VAC to his leg wound also, over the biologic.  Home health will continue to change the sacral VAC dressing 2 times per week in between clinic visits.  They will be instructed not to disturb the VAC and biologic to his leg.  This will be changed in clinic weekly.   The pressure injuries to his left posterior lower leg, and right lower back/upper buttock appear to be resolving.  No need to debride these today.    6/3/2022 : Clinic visit with ADILSON Arthur FNP-BC, LILIYA VALERIO.  Anjum states that he is feeling very well.  He was seen by Dr. Raman's PA earlier today.  VAC was not removed because patient was worried about VAC being off until he was seen in the clinic.  She did review his wound photos in epic.  Patient also informing that his home health nurses were worried about the appearance of his sacral wound last Friday, and cultured the wound, received order from patient's PCP, and sent culture to [facility].  Unfortunately, I do not have access to these results.  Wound did not appear to be infected, no appreciable odor, evidence of new granulation.  The pressure ulcer to his left posterior lower leg has resolved.  DTI to right upper buttock also appears to be resolving.   Patient states he has had problems with frequent notifications from his pump that canister is full, even when it is not.  He has been going through canisters every day or so.  I notified [facility] representative, Mae Mccullough, requesting new pump be delivered, and perhaps a few more canisters.    6/10/2022 : Clinic visit with ADILSON Arthur FNP-BC, LILIYA VALERIO.  Anjum  states that he continues to feel well.  Home health is seeing him 2 times per week in between clinic visits.  [employer] replace his VAC machine last week, he has had no further problems.  He has been taking urgent need as recommended, though admits that he does not take it as often as he should.  He has been trying to eat a high-protein diet.  He has not yet made an appointment with Dr. Chaves for plastic surgery consult.  I recommended that he do so.   He presents today with reopening of posterior left lower leg pressure injury, which had been resolved last week.  Treatment restarted.   He does raise concern today that there is no provider available in clinic on 7/8.  We discussed possibly skipping wound clinic appointment that week, and having home health change of VAC to leg wound on that date.  As we get closer to this date, we will clarify.      6/17/2022 : Clinic visit with ADILSON Arthur, DAT-BC, ALEXN, CFTRACI.  Anjum continues to feel well, tolerating VAC without difficulty.  States he has finally heard back from Dr. Chaves's office, was informed that he is no longer doing wound care, at least through the end of the year.  Patient's sacral wound is not progressing, and in fact presents today with noticeable odor and poor tissue quality.  His leg wound however has improved slightly with decreased depth to distal wound, and increased granulation tissue.  Indentation all around periwound, caused by paste ring.  We opted to not use paste during in clinic today.   Suggested possible hospitalization because of sacral wound.  However, patient was not enthused about this idea.  Puracyn gel applied to wound bed prior to placement of VAC.  Instructions were written for home health to discontinue VAC on Monday and start daily Dakin's moistened gauze in an effort to manage bioburden.   I will attempt to refer patient to Dr. Browne at [facility].  I will also reach out to ID regarding condition of sacral wound.  Patient would likely  benefit from hospitalization for surgical I&D, vera flow VAC for a few days, and possibly IV antibiotics.      6/24/2022 : Clinic visit with ADILSON Arthur, HOLDEN, IMAN, LILIYA.   Anjum states that he continues to feel well.  He was contacted by Dr. Browne's office, informed that he would not see him for plastic surgery consult.  Reason provided was that he had had previous flaps which have failed.  Wound odor significantly less today, but appearance of sacral wound bed much improved.  However, depth has not decreased, and bone still exposed.  He cannot undergo MRI because of extensive hardware in his spine and pelvis.  X-ray ordered to assess for OM.  ESR and sed rate also ordered.  Patient thought he could get x-ray and labs drawn by [facility].  I contacted [facility] staff myself.  I was informed that only their providers could decide whether or not he needed x-ray and labs.  Relayed this information to patient, advised him to call and schedule an appointment for his labs and x-ray, and explained to them that he is limited by his mobility and transportation, and thus needs an appointment.   I also discussed with Anjum the possibility that he may require hospitalization at some point if this wound does not progress.  If OM of sacrum confirmed, he may need to go on IV antibiotics.    7/1/2022: Clinic visit with ADILSON Flores.  1 view of the pelvis performed on 6/28 did not show any evidence of osteomyelitis.  However, the patient has 3 areas of exposed bone to the area which is clinically defined as osteomyelitis.  The patient's left medial lower extremity wound has degraded with boggy fluctuant tissue to the superior aspect of the wound.  We will request lab work from [facility] for recent ESR and sed rate.    7/15/2022 : Clinic visit with ADILSON Arthur, HOLDEN, IMAN, LILIYA.  Anjum states he is feeling well, offers no complaints .  The area of his sacral wound decreased, however still  probes to bone.  There is denudation around his lower leg wound, caused by improper application of VAC by home health nurse.  VAC was held from this wound to allow periwound skin to heal.  I also did not apply biologic to the leg wound today, will restart next week.   ESR and CRP results received from [facility] (under media tab), both elevated, ESR 31, CRP 13    REVIEW OF SYSTEMS:   Review of Systems   Constitutional: Negative for chills and fever.   Respiratory: Negative for cough and shortness of breath.    Cardiovascular: Negative for chest pain.   Gastrointestinal: Negative for constipation, diarrhea, nausea and vomiting.        Colostomy in place   Genitourinary:        Suprapubic catheter    Musculoskeletal:        Incomplete quadriplegia       PHYSICAL EXAMINATION:   /67 (BP Location: Left arm, Patient Position: Sitting)   Pulse 60   Temp 37.1 °C (98.7 °F)   Resp 18   SpO2 92%   Physical Exam  Constitutional:       Appearance: He is obese.   HENT:      Head: Normocephalic.   Eyes:      Pupils: Pupils are equal, round, and reactive to light.   Cardiovascular:      Rate and Rhythm: Bradycardia present.   Pulmonary:      Effort: Pulmonary effort is normal. No respiratory distress.      Breath sounds: No wheezing.   Abdominal:      Palpations: Abdomen is soft.   Skin:     Comments: Coccygeal pressure injury, stage IV  Full-thickness wound to left medial lower extremity with boggy fluctuant tissue to the superior aspect   Neurological:      Mental Status: He is alert and oriented to person, place, and time.         WOUND ASSESSMENT  Wound 04/11/22 Full Thickness Wound Leg Medial Left --Left Medial Lower Leg (Active)   Wound Image    07/15/22 1400   Site Assessment Red;Yellow 07/15/22 1400   Periwound Assessment Denuded;Red 07/15/22 1400   Margins Unattached edges 07/15/22 1400   Drainage Amount Moderate 07/15/22 1400   Drainage Description Serosanguineous;Abel 07/15/22 1400   Treatments Cleansed 07/15/22  1400   Wound Cleansing Puracyn [place] 07/15/22 1400   Periwound Protectant Barrier Paste;Skin Protectant Wipes to Periwound 07/15/22 1400   Dressing Cleansing/Solutions Not Applicable 07/15/22 1400   Dressing Options Collagen Dressing;Hydrofiber Silver;Silicone Adhesive Foam;Tubigrip 07/15/22 1400   Dressing Changed New 07/15/22 1400   Dressing Status Clean;Dry;Intact 04/11/22 1330   Dressing Change/Treatment Frequency Monday, Wednesday, Friday, and As Needed 05/27/22 1400   Non-staged Wound Description Full thickness 07/15/22 1400   Wound Length (cm) 4.3 cm 07/15/22 1400   Wound Width (cm) 1.2 cm 07/15/22 1400   Wound Depth (cm) 0.9 cm 07/15/22 1400   Wound Surface Area (cm^2) 5.16 cm^2 07/15/22 1400   Wound Volume (cm^3) 4.644 cm^3 07/15/22 1400   Post-Procedure Length (cm) 4.3 cm 07/15/22 1400   Post-Procedure Width (cm) 1.2 cm 07/15/22 1400   Post-Procedure Depth (cm) 1 cm 07/15/22 1400   Post-Procedure Surface Area (cm^2) 5.16 cm^2 07/15/22 1400   Post-Procedure Volume (cm^3) 5.16 cm^3 07/15/22 1400   Wound Healing % -9575 07/15/22 1400   Tunneling (cm) 0.7 cm 07/15/22 1400   Tunneling Clock Position of Wound 12 07/15/22 1400   Undermining (cm) 0 cm 07/15/22 1400   Undermining of Wound, 1st Location From 2 o'clock;To 4 o'clock 06/03/22 1300   Wound Odor None 07/15/22 1400   Exposed Structures None 07/15/22 1400   Number of days: 95       Wound 04/22/22 Pressure Injury Sacrum POA stage 4 (Active)   Wound Image    07/15/22 1400   Site Assessment Red;Yellow;White 07/15/22 1400   Periwound Assessment Dry;Intact;Scar tissue 07/15/22 1400   Margins Unattached edges 07/15/22 1400   Drainage Amount Moderate 07/15/22 1400   Drainage Description Serosanguineous 07/15/22 1400   Treatments Cleansed;Provider debridement 07/15/22 1400   Wound Cleansing Normal Saline Irrigation 07/15/22 1400   Periwound Protectant Benzoin;Paste Ring 07/15/22 1400   Dressing Cleansing/Solutions Normal Saline 07/15/22 1400   Dressing Options  Wound Vac 07/15/22 1400   Dressing Changed Changed 07/15/22 1400   Dressing Change/Treatment Frequency Monday, Wednesday, Friday, and As Needed 07/15/22 1400   Pressure Injury Stage 4 06/17/22 1400   Wound Length (cm) 2.5 cm 07/15/22 1400   Wound Width (cm) 1.5 cm 07/15/22 1400   Wound Depth (cm) 1.6 cm 07/15/22 1400   Wound Surface Area (cm^2) 3.75 cm^2 07/15/22 1400   Wound Volume (cm^3) 6 cm^3 07/15/22 1400   Post-Procedure Length (cm) 2.5 cm 07/15/22 1400   Post-Procedure Width (cm) 1.6 cm 07/15/22 1400   Post-Procedure Depth (cm) 1.6 cm 07/15/22 1400   Post-Procedure Surface Area (cm^2) 4 cm^2 07/15/22 1400   Post-Procedure Volume (cm^3) 6.4 cm^3 07/15/22 1400   Wound Healing % -150 07/15/22 1400   Tunneling (cm) 0 cm 07/15/22 1400   Tunneling Clock Position of Wound 12 05/03/22 1600   Undermining (cm) 2.5 cm 07/15/22 1400   Undermining of Wound, 1st Location From 12 o'clock;To 2 o'clock 07/15/22 1400   Undermining (cm) - 2nd location 3 cm 07/15/22 1400   Undermining of Wound, 2nd Location From 9 o'clock;To 11 o'clock 07/15/22 1400   Undermining (cm) - 3rd location 3.2 cm 06/24/22 1000   Undermining of Wound, 3rd Location From 7 o'clock;To 11 o'clock 06/24/22 1000   Wound Odor None 07/15/22 1400   Exposed Structures Bone 07/15/22 1400   Number of days: 84         PROCEDURE: Excisional debridement of sacrococcygeal wound  -2% viscous lidocaine applied topically to wound bed for approximately 5 minutes prior to debridement  -Curette used to debride wound bed.  Excisional debridement was performed to remove devitalized tissue until healthy, bleeding tissue was visualized.  Total area debrided approximately 15 cm², into the muscle layer.  -Bleeding controlled with manual pressure.    -Wound care completed by wound RN, refer to flowsheet  -Patient tolerated the procedure well, without c/o pain or discomfort.       PROCEDURE: Excisional debridement of left medial lower leg full-thickness wound  -2% viscous lidocaine  applied topically to wound bed for approximately 5 minutes prior to debridement  -Curette used to debride wound bed.  Excisional debridement was performed to remove devitalized tissue until healthy, bleeding tissue was visualized.  Total area debrided approximately 5.16 cm². Deepest level of debridement was into the muscle layer.  -Bleeding controlled with manual pressure.    -Wound care completed by wound RN, refer to flowsheet  -Patient tolerated the procedure well, without c/o pain or discomfort.         BIOLOGIC LOG  5/20/2022-first application.  PRODUCT: EpiCord 2 x 3 cm . PRODUCT #[id number]; EXPIRES: 2026-12-01 5/27/2022- second application.  PRODUCT: EpiCordEX 2 x 3 cm . PRODUCT #[id number]; EXPIRES: 2026-09-01    6/3/2022- third application.  PRODUCT: EpiCordEX 2 x 3 cm . PRODUCT #[id number]; EXPIRES: 2026-10-01    6/10/2022- fourth application.  PRODUCT: EpiCordEX 2 x 3 cm . PRODUCT #[id number]; EXPIRES: 2026-09-01 6/17/2022- fifth application.  PRODUCT: EpiCordEX 2 x 3 cm . PRODUCT #[id number]; EXPIRES: 2027-02-01 6/24/2022- sixth application.  PRODUCT: EpiCordEX 2 x 3 cm . PRODUCT #[id number]; EXPIRES: 2027-02-01 07/01/22: Seventh application.  Product: Epicort Dx 2.0 x 3.0 cm reorder number [id number]; product number [id number]; expires 2027-02-01    Pertinent Labs and Diagnostics:    Labs:     A1c: No results found for: HBA1C     [facility] results, 7/1/2022 (under media tab)    CRP 13    ESR 31      IMAGING: No results found.    VASCULAR STUDIES: No results found.    LAST  WOUND CULTURE:   Lab Results   Component Value Date/Time    CULTRSULT - (A) 05/19/2022 11:00 AM    CULTRSULT Candida tropicalis  10-50,000 cfu/mL   (A) 05/19/2022 11:00 AM          ASSESSMENT AND PLAN:     1. Sacral decubitus ulcer, stage IV (HCC)  Comments: Ulcer first noted in early April 2022 as small open area which quickly enlarged.  This ulcer is present distal from  previous sacral ulcer which healed after surgery in January.  Patient has history of flap reconstruction x2 to this area.    7/15/2022: Outer wound dimensions have decreased slightly since last assessment.  Depth still probes close to bone, however there appears to be increased granulation tissue.  -Excisional debridement of wound in clinic today, medically necessary to promote wound healing.  -Patient to return to clinic weekly for assessment and debridement  -Home health to change VAC dressing 2 times per week in between clinic visits  -Thus far, we have not been able to find a plastic surgeon willing to consult  -X-ray was negative for osteomyelitis please see scanned results under media tab.   - ESR and CRP results have been received from [facility], both are elevated.  Results under media tab  -Pt advised to go to ER for any increased redness, swelling, drainage or odor, or if he develops fever, chills, nausea or vomiting.    Wound care: NPWT at 125 mmHg to accelerate granulation, change 3 times per week.      2.  Postoperative wound dehiscence, subsequent encounter  Comments: On 4/26 patient underwent irrigation and debridement of multiple compartments of the left lower extremity states for pressure injury, with bone excision, and complex closure of chronic wound using biologic skin substitute.  During postop visit in surgeons office on 5/11, surgical site was noted to be dehisced.  Patient was referred to wound clinic for management.    7/15/2022: Denudation to periwound tissue due to improper application of VAC by home health.  -Excisional debridement of wound in clinic today, medically necessary to promote wound healing.  -VAC hold x1 week to allow periwound to heal  -Biologic was not applied today  -Patient to return to clinic weekly for assessment, and debridement, and possibly to resume biologic and VAC  -Home health to change dressing 2 times this week.  There is no biologic in place    Wound care:  Collagen to accelerate granulation, silver Hydrofiber to manage exudate and bioburden, foam cover dressing, Tubigrip to manage edema      3.  Pressure injury of left leg, stage III  Comments: New pressure injury to left posterior lower leg noted first observed in clinic on 5/12/2022.  Located within scar tissue.    07/15/22: Wound has resolved continues to remain closed  -Monitor each clinic visit      Wound care: Open to air      4.  Pressure injury of deep tissue of right buttock  Comments: First observed in clinic 5/20/2022    7/15/2022: Remains resolved    -Monitor each clinic visit    Wound care: Open to air    5. Quadriplegia, C5-C7, complete (HCC)  Comments: Complicating factor.  Impaired mobility and sensation.    7/15/2022: Patient has appropriate offloading in his wheelchair, low-air-loss mattress with alternating pressure.  He is well versed on pressure relieving strategies.      PATIENT EDUCATION  -Etiology of pressure injury  -Importance of offloading and frequent repositioning  -Strategies for offloading in bed and when seated discussed and demonstrated  - Importance of adequate nutrition for wound healing  - Advised to go to ER for any increased redness, swelling, drainage or odor, or if patient develops fever, chills, nausea or vomiting.      Please note that this note may have been created using voice recognition software. I have worked with technical experts from [employer] to optimize the interface.  I have made every reasonable attempt to correct obvious errors, but there may be errors of grammar and possibly content that I did not discover before finalizing the note.

## 2022-07-15 NOTE — PROGRESS NOTES
While assisting James Jiang RN to care for pt today, I changed his ostomy appliance. Orders to do so received from Abby DE ANDA. Small intact blister noted to R hip 0.7x1.0cm, apparently from VAC drape. Zinc oxide and small ad foam applied.

## 2022-07-18 NOTE — ADDENDUM NOTE
[address] [facility] EMERGENCY DEPT 
[address] [facility] 
[phone] Work/School Note Date: 1/21/2020 To Whom It May concern: 
 
Daria Simpson was seen and treated today in the emergency room by the following provider(s): 
Attending Provider: Bryson Magallanes MD. Daria Simpson may return to work on 01/23/2020. Sincerely, Mata Salinas RN 
 
 
 
 Addended by: CARLOS POP on: 7/18/2022 03:26 PM     Modules accepted: Orders

## 2022-07-21 ENCOUNTER — HOSPITAL ENCOUNTER (OUTPATIENT)
Facility: MEDICAL CENTER | Age: 72
End: 2022-07-21
Attending: STUDENT IN AN ORGANIZED HEALTH CARE EDUCATION/TRAINING PROGRAM
Payer: MEDICARE

## 2022-07-21 PROCEDURE — 87086 URINE CULTURE/COLONY COUNT: CPT

## 2022-07-22 ENCOUNTER — OFFICE VISIT (OUTPATIENT)
Dept: WOUND CARE | Facility: MEDICAL CENTER | Age: 72
End: 2022-07-22
Attending: INTERNAL MEDICINE
Payer: MEDICARE

## 2022-07-22 VITALS
TEMPERATURE: 98.1 F | HEART RATE: 54 BPM | DIASTOLIC BLOOD PRESSURE: 82 MMHG | OXYGEN SATURATION: 94 % | SYSTOLIC BLOOD PRESSURE: 139 MMHG | RESPIRATION RATE: 18 BRPM

## 2022-07-22 DIAGNOSIS — L89.154 SACRAL DECUBITUS ULCER, STAGE IV (HCC): Primary | ICD-10-CM

## 2022-07-22 DIAGNOSIS — L89.316 PRESSURE INJURY OF DEEP TISSUE OF RIGHT BUTTOCK: ICD-10-CM

## 2022-07-22 DIAGNOSIS — T81.31XD POSTOPERATIVE WOUND DEHISCENCE, SUBSEQUENT ENCOUNTER: ICD-10-CM

## 2022-07-22 DIAGNOSIS — G82.53 QUADRIPLEGIA, C5-C7, COMPLETE (HCC): ICD-10-CM

## 2022-07-22 DIAGNOSIS — L89.893 PRESSURE INJURY OF LEFT LEG, STAGE 3 (HCC): ICD-10-CM

## 2022-07-22 PROCEDURE — 11043 DBRDMT MUSC&/FSCA 1ST 20/<: CPT | Mod: 59 | Performed by: NURSE PRACTITIONER

## 2022-07-22 PROCEDURE — 97605 NEG PRS WND THER DME<=50SQCM: CPT | Mod: XU

## 2022-07-22 PROCEDURE — 15271 SKIN SUB GRAFT TRNK/ARM/LEG: CPT

## 2022-07-22 PROCEDURE — 11043 DBRDMT MUSC&/FSCA 1ST 20/<: CPT | Mod: XS

## 2022-07-22 PROCEDURE — 15271 SKIN SUB GRAFT TRNK/ARM/LEG: CPT | Performed by: NURSE PRACTITIONER

## 2022-07-22 NOTE — PROGRESS NOTES
Provider Encounter- Pressure Injury        HISTORY OF PRESENT ILLNESS  Wound History:    START OF CARE IN CLINIC: 5/3/2022    REFERRING PROVIDER: Sahara Mendez      WOUNDS- Pressure Injury, Sacrococcygeal, stage IV                                                             Surgical wound dehiscence -status post surgical I&D of stage IV pressure injury and biologic application                      Pressure injury, stage III, left posterior lower leg-first observed in clinic 5/12/2022; resolved 6/3/2022; reopened 6/10/2022; resolved again 6/17/2022                                 Pressure injury, DTI, right buttock/lower back-first observed in clinic 5/20/2022; resolved 6/17/2022     HISTORY: Patient with history of incomplete quadriplegia referred to [facility] for treatment of a stage IV pressure injury.  He has a history of previous pressure injuries to this area, and underwent muscle flaps in 2019, and then again in 2020.  He was seen in the wound clinic in November 2021 for an ulcer proximal from his current ulcer, and pressure injuries to his left posterior lower leg and left heel.  At that time, it was discovered that the patient had retained VAC foam embedded in the wound bed of the sacral wound.  Attempts were made to get him back to his plastic surgeon, though unsuccessful.  In January he underwent surgical removal of VAC sponge along with excisional debridement of his sacral wound by Dr. Chaves.  After the surgery, his wound went on to heal without incident.   In early April 2022, his home health nurse noted a new sacral ulcer, below the previous ulcer which quickly tripled in size over the following weeks.  The ulcer to his left medial lower leg had also deteriorated, with bone visible at the base..  He was hospitalized from 4/22 until 4/27/2022 and underwent surgery with Dr. Raman on 4/26 for irrigation and debridement of multiple compartments of the left lower extremity, bone excision, and complex closure of  chronic wound using biologic skin substitute.   His sacrococcygeal wound was not surgically addressed during this admission.  He was discharged back to his group home, with home health, and referral to outpatient wound clinic for his sacral wound.  He was instructed to follow-up with his surgeon for his lower leg wound.       Postoperatively, the left medial lower leg incision dehisced.  He was seen by his surgeon at [facility] on 5/11.  The surgeon opted to leave remaining sutures in place, and refer him to the wound clinic for treatment of this wound.   Treatment of this wound was initiated in clinic on 5/12.  During this visit was also noted that his heel DTI had resolved, but that he had a new pressure injury to his left posterior lower extremity.     A new pressure injury was noted to patient's right upper buttock/lower back on 5/20/2022.  Wound was linear in shape, skin discolored but intact.    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  Never used smokeless tobacco    Patient's problem list, allergies, and current medications reviewed and updated in Epic    Interval History:  5/3/2022 : Clinic visit with ADILSON Arthur, DAT-BC, CWDINESHN, CFCN.  Patient states he is feeling well overall, denies fevers, chills, nausea, vomiting, cough or shortness of breath.  During his initial assessment, a piece of fenestrated tubing, approximately 6 inches long, 3 to 5 mm in diameter, was identified and removed from his sacral wound.  Patient informed me that after his flap procedure in 2020 it was believed that part of a drain tube was retained within his wound.  The surgeon at the time decided not  to explore the wound.   The surgical dressing was removed from his left lower leg wound.  Steri-Strips in place over biologic.  Patient informed me that he has a follow-up appointment with Dr. Raman a week from tomorrow (5/11).  Patient is under the impression that Dr. Raman will be managing this wound.      5/12/2022 : Clinic visit with ADILSON Arthur, HOLDEN, IMAN, LILIYA.  Anjum states he is feeling very well.  He was seen by Dr. Raman yesterday who was concerned with the appearance of his lower leg wound.  Per Dr. Raman's note, sutures to be left in place for another week.  No residual biologic product noted within wound bed.  We will request authorization to continue.   Patient sacral wound is with significantly less slough than last week.  Discussed with patient treating with VAC.  Given his past history of retained foam, expected him to be resistant to the idea, however he stated he was willing to do what ever necessary to expedite healing of this wound.   Patient is up in his wheelchair 7 to 8 hours/day, has appropriate pressure relief cushion in his chair, and the ability to raise and lower back to his wheelchair mechanically.  He also knows to physically reposition himself every 1/2 hour.  He has a low-air-loss bed on his mattress, with alternating pressure.  He is very well versed on pressure-relieving techniques.    5/20/2022 : Clinic visit with ADILSON Arthur, HOLDEN, IMAN, LILIYA.  Anjum states he is feeling well today.  He understands that the VAC is to be initiated on his sacral wound today.  He verbalizes some worry about possibility of VAC retention, as this was a problem before.  I assured him that we would provide appropriate precautions, and would make sure that we write on the outside of the dressing number of pieces of foam used, and we would be communicating with home health.   We have also been approved for biologic to his left posterior lower leg wound and will be initiating  this today.  Sutures also removed as recommended by Dr. Raman.  The smaller, more superficial wound to his posterior lower leg appears to be crusting adequately    5/27/2022 : Clinic visit with ADILSON Arthur FNP-BC, LILIYA VALERIO.  Brandon continues to feel well overall.  He has had no problems with the VAC to his sacrum over this past week.  We have received approval to apply VAC to his leg wound also, over the biologic.  Home health will continue to change the sacral VAC dressing 2 times per week in between clinic visits.  They will be instructed not to disturb the VAC and biologic to his leg.  This will be changed in clinic weekly.   The pressure injuries to his left posterior lower leg, and right lower back/upper buttock appear to be resolving.  No need to debride these today.    6/3/2022 : Clinic visit with ADILSON Arthur FNP-BC, LILIYA VALERIO.  Anjum states that he is feeling very well.  He was seen by Dr. Raman's PA earlier today.  VAC was not removed because patient was worried about VAC being off until he was seen in the clinic.  She did review his wound photos in epic.  Patient also informing that his home health nurses were worried about the appearance of his sacral wound last Friday, and cultured the wound, received order from patient's PCP, and sent culture to [facility].  Unfortunately, I do not have access to these results.  Wound did not appear to be infected, no appreciable odor, evidence of new granulation.  The pressure ulcer to his left posterior lower leg has resolved.  DTI to right upper buttock also appears to be resolving.   Patient states he has had problems with frequent notifications from his pump that canister is full, even when it is not.  He has been going through canisters every day or so.  I notified [facility] representative, Mae Mccullough, requesting new pump be delivered, and perhaps a few more canisters.    6/10/2022 : Clinic visit with ADILSON Arthur FNP-BC, LILIYA VALERIO.  Anjum  states that he continues to feel well.  Home health is seeing him 2 times per week in between clinic visits.  [employer] replace his VAC machine last week, he has had no further problems.  He has been taking urgent need as recommended, though admits that he does not take it as often as he should.  He has been trying to eat a high-protein diet.  He has not yet made an appointment with Dr. Chaves for plastic surgery consult.  I recommended that he do so.   He presents today with reopening of posterior left lower leg pressure injury, which had been resolved last week.  Treatment restarted.   He does raise concern today that there is no provider available in clinic on 7/8.  We discussed possibly skipping wound clinic appointment that week, and having home health change of VAC to leg wound on that date.  As we get closer to this date, we will clarify.      6/17/2022 : Clinic visit with ADILSON Arthur, DAT-BC, ALEXN, CFTRACI.  Anjum continues to feel well, tolerating VAC without difficulty.  States he has finally heard back from Dr. Chaves's office, was informed that he is no longer doing wound care, at least through the end of the year.  Patient's sacral wound is not progressing, and in fact presents today with noticeable odor and poor tissue quality.  His leg wound however has improved slightly with decreased depth to distal wound, and increased granulation tissue.  Indentation all around periwound, caused by paste ring.  We opted to not use paste during in clinic today.   Suggested possible hospitalization because of sacral wound.  However, patient was not enthused about this idea.  Puracyn gel applied to wound bed prior to placement of VAC.  Instructions were written for home health to discontinue VAC on Monday and start daily Dakin's moistened gauze in an effort to manage bioburden.   I will attempt to refer patient to Dr. Browne at [facility].  I will also reach out to ID regarding condition of sacral wound.  Patient would likely  benefit from hospitalization for surgical I&D, vera flow VAC for a few days, and possibly IV antibiotics.      6/24/2022 : Clinic visit with ADILSON Arthur, HOLDEN, IMAN, LILIYA.   Anjum states that he continues to feel well.  He was contacted by Dr. Browne's office, informed that he would not see him for plastic surgery consult.  Reason provided was that he had had previous flaps which have failed.  Wound odor significantly less today, but appearance of sacral wound bed much improved.  However, depth has not decreased, and bone still exposed.  He cannot undergo MRI because of extensive hardware in his spine and pelvis.  X-ray ordered to assess for OM.  ESR and sed rate also ordered.  Patient thought he could get x-ray and labs drawn by [facility].  I contacted [facility] staff myself.  I was informed that only their providers could decide whether or not he needed x-ray and labs.  Relayed this information to patient, advised him to call and schedule an appointment for his labs and x-ray, and explained to them that he is limited by his mobility and transportation, and thus needs an appointment.   I also discussed with Anjum the possibility that he may require hospitalization at some point if this wound does not progress.  If OM of sacrum confirmed, he may need to go on IV antibiotics.    7/1/2022: Clinic visit with ADILSON Flores.  1 view of the pelvis performed on 6/28 did not show any evidence of osteomyelitis.  However, the patient has 3 areas of exposed bone to the area which is clinically defined as osteomyelitis.  The patient's left medial lower extremity wound has degraded with boggy fluctuant tissue to the superior aspect of the wound.  We will request lab work from [facility] for recent ESR and sed rate.    7/15/2022 : Clinic visit with ADILSON Arthur, HOLDEN, IMAN, LILIYA.  Anjum states he is feeling well, offers no complaints .  The area of his sacral wound decreased, however still  probes to bone.  There is denudation around his lower leg wound, caused by improper application of VAC by home health nurse.  VAC was held from this wound to allow periwound skin to heal.  I also did not apply biologic to the leg wound today, will restart next week.   ESR and CRP results received from [facility] (under media tab), both elevated, ESR 31, CRP 13    7/22/2022 : Clinic visit with Kelly Sandy, APRN, FNP-BC, ALEXN, CFTRACI.  Anjum continues to feel well.  He admits to spending approximately 7 to 8 hours/day up in his wheelchair, he has not on appropriate cushion, and tries to reposition frequently.  He does present today with a new small abrasion to his left anterior shin.  He states he bumped into his food tray.  This wound does not appear worrisome, but we will monitor.  The periwound skin origination around his leg wound is nearly resolved, biologic and VAC resumed today after debridement.   Sacral wound presents with increased granulation, though depth has not decreased significantly.  Periwound is intact.    REVIEW OF SYSTEMS:   Unchanged from previous clinic visit on 7/15/2022    PHYSICAL EXAMINATION:   /82 (BP Location: Left arm, Patient Position: Sitting)   Pulse (!) 54 Comment: RN notified  Temp 36.7 °C (98.1 °F)   Resp 18   SpO2 94%   Physical Exam  Constitutional:       Appearance: He is obese.   HENT:      Head: Normocephalic.   Eyes:      Pupils: Pupils are equal, round, and reactive to light.   Cardiovascular:      Rate and Rhythm: Bradycardia present.   Pulmonary:      Effort: Pulmonary effort is normal. No respiratory distress.      Breath sounds: No wheezing.   Abdominal:      Palpations: Abdomen is soft.   Skin:     Comments: Coccygeal pressure injury, stage IV.  Presents today with increased granulation.  Minimal change in area and depth.  Full-thickness wound to left medial lower extremity, periwound denudation nearly resolved, new epithelialization along distal margin  Dry  abrasion to left anterior shin, no periwound erythema, no drainage   Neurological:      Mental Status: He is alert and oriented to person, place, and time.         WOUND ASSESSMENT  Wound 04/11/22 Full Thickness Wound Leg Medial Left --Left Medial Lower Leg (Active)   Wound Image    07/22/22 1400   Site Assessment Red;Yellow 07/22/22 1400   Periwound Assessment Edema;Fragile;Denuded 07/22/22 1400   Margins Unattached edges 07/22/22 1400   Drainage Amount Moderate 07/22/22 1400   Drainage Description Serosanguineous 07/22/22 1400   Treatments Cleansed;Provider debridement 07/22/22 1400   Wound Cleansing Normal Saline Irrigation 07/22/22 1400   Periwound Protectant Skin Protectant Wipes to Periwound;Drape 07/22/22 1400   Dressing Cleansing/Solutions Normal Saline 07/22/22 1400   Dressing Options Biologic (Skin Substitute);Adaptic;Steri-Strip;Wound Vac;Tubigrip;Hydrofiber Silver 07/22/22 1400   Dressing Changed New 07/15/22 1400   Dressing Status Clean;Dry;Intact 04/11/22 1330   Dressing Change/Treatment Frequency Monday, Wednesday, Friday, and As Needed 05/27/22 1400   Non-staged Wound Description Full thickness 07/22/22 1400   Wound Length (cm) 3.5 cm 07/22/22 1400   Wound Width (cm) 2.2 cm 07/22/22 1400   Wound Depth (cm) 0.9 cm 07/22/22 1400   Wound Surface Area (cm^2) 7.7 cm^2 07/22/22 1400   Wound Volume (cm^3) 6.93 cm^3 07/22/22 1400   Post-Procedure Length (cm) 3.4 cm 07/22/22 1400   Post-Procedure Width (cm) 2.2 cm 07/22/22 1400   Post-Procedure Depth (cm) 1 cm 07/22/22 1400   Post-Procedure Surface Area (cm^2) 7.48 cm^2 07/22/22 1400   Post-Procedure Volume (cm^3) 7.48 cm^3 07/22/22 1400   Wound Healing % -[id number] 07/22/22 1400   Tunneling (cm) 0.6 cm 07/22/22 1400   Tunneling Clock Position of Wound 12 07/22/22 1400   Undermining (cm) 0 cm 07/22/22 1400   Undermining of Wound, 1st Location From 2 o'clock;To 4 o'clock 06/03/22 1300   Wound Odor None 07/22/22 1400   Exposed Structures None 07/22/22 1400   Number  of days: 102       Wound 04/22/22 Pressure Injury Sacrum POA stage 4 (Active)   Wound Image    07/22/22 1400   Site Assessment Red;Yellow;White 07/22/22 1400   Periwound Assessment Scar tissue 07/22/22 1400   Margins Unattached edges 07/22/22 1400   Drainage Amount Moderate 07/22/22 1400   Drainage Description Serosanguineous 07/22/22 1400   Treatments Cleansed;Provider debridement;Site care 07/22/22 1400   Wound Cleansing Normal Saline Irrigation 07/22/22 1400   Periwound Protectant Skin Protectant Wipes to Periwound;Benzoin;Paste Ring;Drape 07/22/22 1400   Dressing Cleansing/Solutions Not Applicable 07/22/22 1400   Dressing Options Wound Vac 07/22/22 1400   Dressing Changed Changed 07/15/22 1400   Dressing Change/Treatment Frequency Monday, Wednesday, Friday, and As Needed 07/15/22 1400   Pressure Injury Stage 4 07/22/22 1400   Wound Length (cm) 2 cm 07/22/22 1400   Wound Width (cm) 1.6 cm 07/22/22 1400   Wound Depth (cm) 2 cm 07/22/22 1400   Wound Surface Area (cm^2) 3.2 cm^2 07/22/22 1400   Wound Volume (cm^3) 6.4 cm^3 07/22/22 1400   Post-Procedure Length (cm) 2.1 cm 07/22/22 1400   Post-Procedure Width (cm) 1.6 cm 07/22/22 1400   Post-Procedure Depth (cm) 2 cm 07/22/22 1400   Post-Procedure Surface Area (cm^2) 3.36 cm^2 07/22/22 1400   Post-Procedure Volume (cm^3) 6.72 cm^3 07/22/22 1400   Wound Healing % -167 07/22/22 1400   Tunneling (cm) 0 cm 07/22/22 1400   Tunneling Clock Position of Wound 12 05/03/22 1600   Undermining (cm) 2.2 cm 07/22/22 1400   Undermining of Wound, 1st Location From 10 o'clock;To 12 o'clock 07/22/22 1400   Undermining (cm) - 2nd location 3 cm 07/15/22 1400   Undermining of Wound, 2nd Location From 9 o'clock;To 11 o'clock 07/15/22 1400   Undermining (cm) - 3rd location 3.2 cm 06/24/22 1000   Undermining of Wound, 3rd Location From 7 o'clock;To 11 o'clock 06/24/22 1000   Wound Odor None 07/22/22 1400   Exposed Structures Bone 07/22/22 1400   Number of days: 91       Wound 07/22/22  Traumatic Leg Anterior Left (Active)   Wound Image   07/22/22 1400   Site Assessment Black;Brown;Yellow 07/22/22 1400   Periwound Assessment Edema;Fragile 07/22/22 1400   Margins Attached edges;Epibole (rolled edges) 07/22/22 1400   Drainage Amount KEN 07/22/22 1400   Drainage Description KEN 07/22/22 1400   Treatments Cleansed;Site care 07/22/22 1400   Wound Cleansing Normal Saline Irrigation 07/22/22 1400   Periwound Protectant Skin Protectant Wipes to Periwound 07/22/22 1400   Dressing Cleansing/Solutions Not Applicable 07/22/22 1400   Dressing Options Hydrofiber Silver;Silicone Adhesive Foam;Tubigrip 07/22/22 1400   Non-staged Wound Description Full thickness 07/22/22 1400   Post-Procedure Length (cm) 2.2 cm 07/22/22 1400   Post-Procedure Width (cm) 1 cm 07/22/22 1400   Post-Procedure Surface Area (cm^2) 2.2 cm^2 07/22/22 1400   Tunneling (cm) 0 cm 07/22/22 1400   Undermining (cm) 0 cm 07/22/22 1400   Wound Odor None 07/22/22 1400   Exposed Structures None 07/22/22 1400   Number of days: 0         PROCEDURE: Excisional debridement of sacrococcygeal wound  -2% viscous lidocaine applied topically to wound bed for approximately 5 minutes prior to debridement  -Curette used to debride wound bed.  Excisional debridement was performed to remove devitalized tissue until healthy, bleeding tissue was visualized.  Total area debrided approximately 15.0 cm² including into wound tract/undermining.  Deepest level of debridement was into the muscle layer.  -Bleeding controlled with manual pressure.    -Wound care completed by wound RN, refer to flowsheet  -Patient tolerated the procedure well, without c/o pain or discomfort.       PROCEDURE: Excisional debridement of left medial lower leg full-thickness wound  -2% viscous lidocaine applied topically to wound bed for approximately 5 minutes prior to debridement  -Curette used to debride wound bed.  Excisional debridement was performed to remove devitalized tissue until  healthy, bleeding tissue was visualized.  Total area debrided 7.48 cm².  Deepest level of debridement was into the muscle layer.  -Bleeding controlled with manual pressure.  -Wound irrigated thoroughly with normal saline  -A small strip was cut from a 2 x 3 cm sheet of epi cord, hydrated and forced into tract at 12:00  -The remaining piece of epi cord was hydrated and applied to wound bed  -Wound care completed by wound RN, refer to flowsheet  -Patient tolerated the procedure well, without c/o pain or discomfort.         BIOLOGIC LOG  5/20/2022-first application.  PRODUCT: EpiCord 2 x 3 cm . PRODUCT #[id number]; EXPIRES: 2026-12-01 5/27/2022- second application.  PRODUCT: EpiCordEX 2 x 3 cm . PRODUCT #[id number]; EXPIRES: 2026-09-01    6/3/2022- third application.  PRODUCT: EpiCordEX 2 x 3 cm . PRODUCT #[id number]; EXPIRES: 2026-10-01    6/10/2022- fourth application.  PRODUCT: EpiCordEX 2 x 3 cm . PRODUCT #[id number]; EXPIRES: 2026-09-01 6/17/2022- fifth application.  PRODUCT: EpiCordEX 2 x 3 cm . PRODUCT #[id number]; EXPIRES: 2027-02-01 6/24/2022- sixth application.  PRODUCT: EpiCordEX 2 x 3 cm . PRODUCT #[id number]; EXPIRES: 2027-02-01 07/01/22: Seventh application.  Product: Epicort Dx 2.0 x 3.0 cm reorder number [id number]; product number [id number]; expires 2027-02-01 7/22/2022- eighth application.  PRODUCT: EpiCord 2 x 3 cm, reorder #[id number]. PRODUCT #[id number]; EXPIRES: 2027-02-01      Pertinent Labs and Diagnostics:    Labs:     A1c: No results found for: HBA1C     [facility] results, 7/1/2022 (under media tab)    CRP 13    ESR 31      IMAGING: No results found.    VASCULAR STUDIES: No results found.    LAST  WOUND CULTURE:   Lab Results   Component Value Date/Time    CULTRSULT - (A) 05/19/2022 11:00 AM    CULTRSULT Candida tropicalis  10-50,000 cfu/mL   (A) 05/19/2022 11:00 AM          ASSESSMENT AND PLAN:     1. Sacral decubitus ulcer,  stage IV (HCC)  Comments: Ulcer first noted in early April 2022 as small open area which quickly enlarged.  This ulcer is present distal from previous sacral ulcer which healed after surgery in January.  Patient has history of flap reconstruction x2 to this area.    7/22/2022: Wound area and depth has not decreased significantly, however he does present with increased granulation, periwound intact.  -Excisional debridement of wound in clinic today, medically necessary to promote wound healing.  -Patient to return to clinic weekly for assessment and debridement  -Home health to change VAC dressing 2 times per week in between clinic visits  -Thus far, we have not been able to find a plastic surgeon willing to consult  -No evidence of OM on recent x-ray  -Patient has been encouraged to minimize time up in his wheelchair.  He does have an appropriate pressure relief cushion, and is on a low-air-loss mattress on his bed.    Wound care: NPWT at 125 mmHg to accelerate granulation, change 3 times per week.      2.  Postoperative wound dehiscence, subsequent encounter  Comments: On 4/26 patient underwent irrigation and debridement of multiple compartments of the left lower extremity states for pressure injury, with bone excision, and complex closure of chronic wound using biologic skin substitute.  During postop visit in surgeons office on 5/11, surgical site was noted to be dehisced.  Patient was referred to wound clinic for management.    7/22/2022: Periwound issues have resolved.  Wound area has not decreased since last assessment, however there is evidence of new epithelialization along distal margin  -Excisional debridement of wound in clinic today, medically necessary to promote wound healing.  -Biologic resumed, epi cord.  ACE application  -VAC restarted  -Home health to change VAC dressing only 1 time per week.  Leave biologic intact    Wound care: Epi cord biologic to accelerate granulation, Adaptic to keep biologic  moist, Steri-Strips to secure, NPWT as adjunctive therapy      3.  Pressure injury of left leg, stage III  Comments: New pressure injury to left posterior lower leg noted first observed in clinic on 5/12/2022.  Located within scar tissue.    7/22/2022: Wound remains resolved  -Monitor each clinic visit      Wound care: Open to air      4.  Pressure injury of deep tissue of right buttock  Comments: First observed in clinic 5/20/2022 7/22/2022: Remains resolved    -Monitor each clinic visit    Wound care: Open to air    5. Quadriplegia, C5-C7, complete (HCC)  Comments: Complicating factor.  Impaired mobility and sensation.    7/22/2022: Patient has appropriate offloading in his wheelchair, low-air-loss mattress with alternating pressure.  He is well versed on pressure relieving strategies.      Please note that this note may have been created using voice recognition software. I have worked with technical experts from [facility] [employer] to optimize the interface.  I have made every reasonable attempt to correct obvious errors, but there may be errors of grammar and possibly content that I did not discover before finalizing the note.

## 2022-07-22 NOTE — PATIENT INSTRUCTIONS
-Keep your wound dressing clean, dry, and intact.    -Change your dressing if it becomes soiled, soaked, or falls off.    -Wound vac may not have any drainage in tube or cannister & it will still be working.   Change cannister if it does become full by pressing tab on side of machine to remove canister and snap on new one. Full canister can be thrown in the trash. If cannister fills with bright red blood - go to ER.   If you are having issues with your wound VAC, please consider patching leaks, changing the canister, or calling [phone] for troubleshooting. If the wound VAC has been off or un-operational for over 2 hours, call wound care center to inform them and remove all dressings including black foam and replace with normal saline damp gauze.     -Should you experience any significant changes in your wound(s), such as infection (redness, swelling, localized heat, increased pain, fever > 101 F, chills) or have any questions regarding your home care instructions, please contact the wound center at [phone]. If after hours, contact your primary care physician or go to the hospital emergency room.

## 2022-07-22 NOTE — CARDIAC REMOTE MONITOR - SCAN
Device transmission reviewed. Device demonstrated appropriate function.       Electronically Signed by: Briana Bell M.D.    7/27/2022  7:57 AM

## 2022-07-29 ENCOUNTER — OFFICE VISIT (OUTPATIENT)
Dept: WOUND CARE | Facility: MEDICAL CENTER | Age: 72
End: 2022-07-29
Attending: INTERNAL MEDICINE
Payer: MEDICARE

## 2022-07-29 VITALS
OXYGEN SATURATION: 96 % | TEMPERATURE: 97.7 F | HEART RATE: 48 BPM | RESPIRATION RATE: 18 BRPM | DIASTOLIC BLOOD PRESSURE: 87 MMHG | SYSTOLIC BLOOD PRESSURE: 159 MMHG

## 2022-07-29 DIAGNOSIS — T81.31XD POSTOPERATIVE WOUND DEHISCENCE, SUBSEQUENT ENCOUNTER: ICD-10-CM

## 2022-07-29 DIAGNOSIS — L89.890 PRESSURE INJURY OF LEFT LEG, UNSTAGEABLE (HCC): ICD-10-CM

## 2022-07-29 DIAGNOSIS — L89.154 SACRAL DECUBITUS ULCER, STAGE IV (HCC): ICD-10-CM

## 2022-07-29 DIAGNOSIS — G82.53 QUADRIPLEGIA, C5-C7, COMPLETE (HCC): ICD-10-CM

## 2022-07-29 PROCEDURE — 97605 NEG PRS WND THER DME<=50SQCM: CPT

## 2022-07-29 PROCEDURE — 11043 DBRDMT MUSC&/FSCA 1ST 20/<: CPT

## 2022-07-29 PROCEDURE — 11046 DBRDMT MUSC&/FSCA EA ADDL: CPT

## 2022-07-29 PROCEDURE — 11043 DBRDMT MUSC&/FSCA 1ST 20/<: CPT | Performed by: NURSE PRACTITIONER

## 2022-07-29 NOTE — PROGRESS NOTES
Provider Encounter- Pressure Injury        HISTORY OF PRESENT ILLNESS  Wound History:    START OF CARE IN CLINIC: 5/3/2022    REFERRING PROVIDER: Sahara Mendez      WOUNDS- Pressure Injury, Sacrococcygeal, stage IV                                                             Surgical wound dehiscence -status post surgical I&D of stage IV pressure injury and biologic application                                                      Abrasion to left anterior lower leg-first observed in clinic 7/22/2022                                Pressure injury, DTI left posterior lower leg-first observed in clinic 7/29/2022     HISTORY: Patient with history of incomplete quadriplegia referred to [facility] for treatment of a stage IV pressure injury.  He has a history of previous pressure injuries to this area, and underwent muscle flaps in 2019, and then again in 2020.  He was seen in the wound clinic in November 2021 for an ulcer proximal from his current ulcer, and pressure injuries to his left posterior lower leg and left heel.  At that time, it was discovered that the patient had retained VAC foam embedded in the wound bed of the sacral wound.  Attempts were made to get him back to his plastic surgeon, though unsuccessful.  In January he underwent surgical removal of VAC sponge along with excisional debridement of his sacral wound by Dr. Chaves.  After the surgery, his wound went on to heal without incident.   In early April 2022, his home health nurse noted a new sacral ulcer, below the previous ulcer which quickly tripled in size over the following weeks.  The ulcer to his left medial lower leg had also deteriorated, with bone visible at the base..  He was hospitalized from 4/22 until 4/27/2022 and underwent surgery with Dr. Raman on 4/26 for irrigation and debridement of multiple compartments of the left lower extremity, bone excision, and complex closure of chronic wound using biologic skin substitute.   His sacrococcygeal  wound was not surgically addressed during this admission.  He was discharged back to his group home, with home health, and referral to outpatient wound clinic for his sacral wound.  He was instructed to follow-up with his surgeon for his lower leg wound.       Postoperatively, the left medial lower leg incision dehisced.  He was seen by his surgeon at [facility] on 5/11.  The surgeon opted to leave remaining sutures in place, and refer him to the wound clinic for treatment of this wound.   Treatment of this wound was initiated in clinic on 5/12.  During this visit was also noted that his heel DTI had resolved, but that he had a new pressure injury to his left posterior lower extremity.     A new pressure injury was noted to patient's right upper buttock/lower back on 5/20/2022.  Wound was linear in shape, skin discolored but intact.     Abrasion noted to left anterior lower leg.  First observed in clinic on 7/22/2022.  Patient states he bumped his leg into his food tray.     Small DTI noted to patient's left lateral lower leg on 7/29/2022.  Skin intact but discolored.     Large area of deep tissue injury noted to patient's left exterior lower leg.  Patient denied any trauma to this area.  Skin intact.  Wound documented.      Pertinent Medical History: Incomplete quadriplegia, history of stage IV pressure injuries, history of flap procedures to sacral pressure injuries, osteomyelitis, obesity, colostomy in place   Contributing factors: Immobility and Obesity, impaired sensation    Personal support: Attendant-staff at care home and home health nursing    TOBACCO USE:   Former smoker, quit in 1977.  Never used smokeless tobacco    Patient's problem list, allergies, and current medications reviewed and updated in Epic    Interval History:  Interval History thinned 7/29/2022.  Please see previous notes for complete interval history.     7/29/2022 : Clinic visit with ADILSON Arthur, FNP-BC, CWDINESHN, CFTRACI.  Turk states he  continues to do well.  He was able to go to [facility] yesterday, spent today [place].  His left lower extremity wound has deteriorated, presents today with significant odor and deterioration of tissue.  VAC held from this wound today after debridement.  I also did not apply biologic today.   Sacral wound about the same in area, though some evidence of increasing granulation.  No evidence of infection.  Small abrasion to his left lower leg appears to be improving.  He has a new small DTI to his left lateral lower leg, skin intact but discolored.    REVIEW OF SYSTEMS:   Unchanged from previous clinic visit on 7/22/2022    PHYSICAL EXAMINATION:   BP (!) 159/87 (BP Location: Left arm, Patient Position: Sitting) Comment: RN notified  Pulse (!) 48 Comment: RN notified  Temp 36.5 °C (97.7 °F)   Resp 18   SpO2 96%   Physical Exam  Constitutional:       Appearance: He is obese.   HENT:      Head: Normocephalic.   Eyes:      Pupils: Pupils are equal, round, and reactive to light.   Cardiovascular:      Rate and Rhythm: Bradycardia present.   Pulmonary:      Effort: Pulmonary effort is normal. No respiratory distress.      Breath sounds: No wheezing.   Abdominal:      Palpations: Abdomen is soft.   Skin:     Comments: Coccygeal pressure injury, stage IV.  Wound area measures about the same, slightly more granulation noted, no signs or symptoms of infection.  Full-thickness wound to left medial lower extremity, tissue quality has deteriorated, odor present.  VAC held, no biologic applied  Abrasion to left anterior lower leg covered with stable eschar, no drainage, no periwound erythema  DTI to left lateral lower leg, first observed in clinic today, skin intact but discolored   Neurological:      Mental Status: He is alert and oriented to person, place, and time.         WOUND ASSESSMENT  Wound 04/11/22 Full Thickness Wound Leg Medial Left --Left Medial Lower Leg (Active)   Wound Image    07/29/22 6584   Site Assessment  Purple;Red;Yellow 07/29/22 1424   Periwound Assessment Edema;Fragile;Denuded 07/29/22 1424   Margins Unattached edges 07/29/22 1424   Drainage Amount Moderate 07/29/22 1424   Drainage Description Serosanguineous 07/29/22 1424   Treatments Cleansed;Provider debridement 07/29/22 1424   Wound Cleansing [place] 07/29/22 1424   Periwound Protectant Skin Protectant Wipes to Periwound 07/29/22 1424   Dressing Cleansing/Solutions Not Applicable 07/29/22 1424   Dressing Options Hydrofiber Silver;Mepilex;Tubigrip 07/29/22 1424   Dressing Changed New 07/15/22 1400   Dressing Status Clean;Dry;Intact 04/11/22 1330   Dressing Change/Treatment Frequency Monday, Wednesday, Friday, and As Needed 05/27/22 1400   Non-staged Wound Description Full thickness 07/29/22 1424   Wound Length (cm) 4 cm 07/29/22 1424   Wound Width (cm) 2.5 cm 07/29/22 1424   Wound Depth (cm) 1 cm 07/29/22 1424   Wound Surface Area (cm^2) 10 cm^2 07/29/22 1424   Wound Volume (cm^3) 10 cm^3 07/29/22 1424   Post-Procedure Length (cm) 4.1 cm 07/29/22 1424   Post-Procedure Width (cm) 2.5 cm 07/29/22 1424   Post-Procedure Depth (cm) 1.1 cm 07/29/22 1424   Post-Procedure Surface Area (cm^2) 10.25 cm^2 07/29/22 1424   Post-Procedure Volume (cm^3) 11.275 cm^3 07/29/22 1424   Wound Healing % -20733 07/29/22 1424   Tunneling (cm) 0 cm 07/29/22 1424   Tunneling Clock Position of Wound 12 07/22/22 1400   Undermining (cm) 0 cm 07/29/22 1424   Undermining of Wound, 1st Location From 2 o'clock;To 4 o'clock 06/03/22 1300   Wound Odor None 07/29/22 1424   Exposed Structures None 07/29/22 1424   Number of days: 109       Wound 04/22/22 Pressure Injury Sacrum POA stage 4 (Active)   Wound Image    07/29/22 1424   Site Assessment Red;Yellow;White 07/29/22 1424   Periwound Assessment Scar tissue 07/29/22 1424   Margins Unattached edges 07/29/22 1424   Drainage Amount Moderate 07/29/22 1424   Drainage Description Serosanguineous 07/29/22 1424   Treatments Cleansed;Provider  debridement 07/29/22 1424   Wound Cleansing Normal Saline Irrigation 07/29/22 1424   Periwound Protectant Skin Protectant Wipes to Periwound;Benzoin;Paste Ring;Drape 07/29/22 1424   Dressing Cleansing/Solutions Not Applicable 07/29/22 1424   Dressing Options Wound Vac 07/29/22 1424   Dressing Changed Changed 07/15/22 1400   Dressing Change/Treatment Frequency Monday, Wednesday, Friday, and As Needed 07/15/22 1400   Pressure Injury Stage 4 07/29/22 1424   Wound Length (cm) 1.5 cm 07/29/22 1424   Wound Width (cm) 2.1 cm 07/29/22 1424   Wound Depth (cm) 2.3 cm 07/29/22 1424   Wound Surface Area (cm^2) 3.15 cm^2 07/29/22 1424   Wound Volume (cm^3) 7.245 cm^3 07/29/22 1424   Post-Procedure Length (cm) 2 cm 07/29/22 1424   Post-Procedure Width (cm) 2.3 cm 07/29/22 1424   Post-Procedure Depth (cm) 2.5 cm 07/29/22 1424   Post-Procedure Surface Area (cm^2) 4.6 cm^2 07/29/22 1424   Post-Procedure Volume (cm^3) 11.5 cm^3 07/29/22 1424   Wound Healing % -202 07/29/22 1424   Tunneling (cm) 0 cm 07/22/22 1400   Tunneling Clock Position of Wound 12 05/03/22 1600   Undermining (cm) 3 cm 07/29/22 1424   Undermining of Wound, 1st Location From 9 o'clock;To 3 o'clock 07/29/22 1424   Undermining (cm) - 2nd location 3 cm 07/15/22 1400   Undermining of Wound, 2nd Location From 9 o'clock;To 11 o'clock 07/15/22 1400   Undermining (cm) - 3rd location 3.2 cm 06/24/22 1000   Undermining of Wound, 3rd Location From 7 o'clock;To 11 o'clock 06/24/22 1000   Wound Odor None 07/29/22 1424   Exposed Structures Bone 07/29/22 1424   Number of days: 98       Wound 07/22/22 Traumatic Leg Anterior Left (Active)   Wound Image   07/29/22 1424   Site Assessment Black;Brown;Yellow 07/29/22 1424   Periwound Assessment Edema;Fragile 07/29/22 1424   Margins Attached edges 07/29/22 1424   Drainage Amount None 07/29/22 1424   Drainage Description KEN 07/22/22 1400   Treatments Cleansed;Site care 07/29/22 1424   Wound Cleansing Normal Saline Irrigation 07/29/22  1424   Periwound Protectant Skin Protectant Wipes to Periwound 07/29/22 1424   Dressing Cleansing/Solutions Not Applicable 07/29/22 1424   Dressing Options Silicone Adhesive Foam;Tubigrip 07/29/22 1424   Non-staged Wound Description Full thickness 07/29/22 1424   Wound Length (cm) 2.1 cm 07/29/22 1424   Wound Width (cm) 0.5 cm 07/29/22 1424   Wound Surface Area (cm^2) 1.05 cm^2 07/29/22 1424   Post-Procedure Length (cm) 2.2 cm 07/22/22 1400   Post-Procedure Width (cm) 1 cm 07/22/22 1400   Post-Procedure Surface Area (cm^2) 2.2 cm^2 07/22/22 1400   Tunneling (cm) 0 cm 07/29/22 1424   Undermining (cm) 0 cm 07/29/22 1424   Wound Odor None 07/29/22 1424   Exposed Structures None 07/29/22 1424   Number of days: 7       Wound 07/29/22 Pressure Injury Leg Posterior;Lateral Left (Active)   Wound Image   07/29/22 1424   Site Assessment Purple 07/29/22 1424   Periwound Assessment Edema;Fragile 07/29/22 1424   Drainage Amount Scant 07/29/22 1424   Drainage Description Serous 07/29/22 1424   Treatments Cleansed;Site care 07/29/22 1424   Wound Cleansing Normal Saline Irrigation 07/29/22 1424   Periwound Protectant Skin Protectant Wipes to Periwound 07/29/22 1424   Dressing Cleansing/Solutions Not Applicable 07/29/22 1424   Dressing Options Mepilex;Tubigrip 07/29/22 1424   Pressure Injury Stage DTPI 07/29/22 1424   Wound Length (cm) 0.6 cm 07/29/22 1424   Wound Width (cm) 1 cm 07/29/22 1424   Wound Surface Area (cm^2) 0.6 cm^2 07/29/22 1424   Tunneling (cm) 0 cm 07/29/22 1424   Undermining (cm) 0 cm 07/29/22 1424   Wound Odor None 07/29/22 1424   Exposed Structures None 07/29/22 1424   Number of days: 0         PROCEDURE: Excisional debridement of sacrococcygeal wound  -2% viscous lidocaine applied topically to wound bed for approximately 5 minutes prior to debridement  -Curette used to debride wound bed.  Excisional debridement was performed to remove devitalized tissue until healthy, bleeding tissue was visualized.  Total area  debrided approximately 10 cm², including into wound tract/undermining.  Deepest level of debridement was into the muscle layer.  -Bleeding controlled with manual pressure.    -Wound care completed by wound RN, refer to flowsheet  -Patient tolerated the procedure well, without c/o pain or discomfort.       PROCEDURE: Excisional debridement of left medial lower leg full-thickness wound  -2% viscous lidocaine applied topically to wound bed for approximately 5 minutes prior to debridement  -Scissors, forceps, and curette used to debride wound bed.  Excisional debridement was performed to remove devitalized tissue until healthy, bleeding tissue was visualized.  Total area debrided 10.25 cm².  Deepest level of debridement was into the muscle layer.  -Bleeding controlled with manual pressure.  -Biologic was not applied today  -Wound irrigated thoroughly with normal saline  -Wound care completed by wound RN, refer to flowsheet  -Patient tolerated the procedure well, without c/o pain or discomfort.         BIOLOGIC LOG  5/20/2022-first application.  PRODUCT: EpiCord 2 x 3 cm . PRODUCT #[id number]; EXPIRES: 2026-12-01 5/27/2022- second application.  PRODUCT: EpiCordEX 2 x 3 cm . PRODUCT #[id number]; EXPIRES: 2026-09-01    6/3/2022- third application.  PRODUCT: EpiCordEX 2 x 3 cm . PRODUCT #[id number]; EXPIRES: 2026-10-01    6/10/2022- fourth application.  PRODUCT: EpiCordEX 2 x 3 cm . PRODUCT #[id number]; EXPIRES: 2026-09-01 6/17/2022- fifth application.  PRODUCT: EpiCordEX 2 x 3 cm . PRODUCT #EX [id number]; EXPIRES: 2027-02-01 6/24/2022- sixth application.  PRODUCT: EpiCordEX 2 x 3 cm . PRODUCT #EX [id number]; EXPIRES: 2027-02-01 07/01/22: Seventh application.  Product: Epicort Dx 2.0 x 3.0 cm reorder number [id number]; product number [id number]; expires 2027-02-01 7/22/2022- eighth application.  PRODUCT: EpiCord 2 x 3 cm, reorder #[id number]. PRODUCT  #[id number]; EXPIRES: 2027-02-01      Pertinent Labs and Diagnostics:    Labs:     A1c: No results found for: HBA1C     [facility] results, 7/1/2022 (under media tab)    CRP 13    ESR 31      IMAGING: No results found.    VASCULAR STUDIES: No results found.    LAST  WOUND CULTURE:   Lab Results   Component Value Date/Time    CULTRSULT - (A) 07/21/2022 01:00 PM    CULTRSULT Candida tropicalis  10-50,000 cfu/mL   (A) 07/21/2022 01:00 PM          ASSESSMENT AND PLAN:     1. Sacral decubitus ulcer, stage IV ([facility])  Comments: Ulcer first noted in early April 2022 as small open area which quickly enlarged.  This ulcer is present distal from previous sacral ulcer which healed after surgery in January.  Patient has history of flap reconstruction x2 to this area.    7/29/2022: No significant change in area or depth.  Slight increase in granulation.  No signs or symptoms of infection today.  -Excisional debridement of wound in clinic today, medically necessary to promote wound healing.  -Patient to return to clinic weekly for assessment and debridement  -Home health to change VAC dressing 2 times per week in between clinic visits  -Patient would benefit from plastic surgery, however we have not been able to find a surgeon willing to see him.  -Healing of this wound is going to be very slow given patient's comorbidities, size and chronicity of wound.    Wound care: NPWT at 125 mmHg to accelerate granulation, change 3 times per week.      2.  Postoperative wound dehiscence, subsequent encounter  Comments: On 4/26 patient underwent irrigation and debridement of multiple compartments of the left lower extremity states for pressure injury, with bone excision, and complex closure of chronic wound using biologic skin substitute.  During postop visit in surgeons office on 5/11, surgical site was noted to be dehisced.  Patient was referred to wound clinic for management.    7/29/2022: Patient presents today with deterioration of  wound tissue, wound odor, and increased area.  -Excisional debridement of wound in clinic today, medically necessary to promote wound healing.  -VAC held, no biologic applied today  -Topical antimicrobial dressing applied.  If no improvement by next clinic visit, obtain culture    Wound care: Silver Hydrofiber to manage exudate and bioburden, foam cover dressing, Hypafix tape      3.  Pressure injury of left leg, deep tissue injury      7/29/2022: Pressure injury to posterior leg remains resolved.  However, he presents today with a new DTI to his left lateral leg.  He does not know how this started.  Skin is intact but discolored.  -No need for debridement  -We will monitor  -Patient is at high risk for pressure injury development due to immobility, lack of sensation, paraplegia    Wound care: Mepilex silicone dressing to protect area from pressure, friction and shear      4. Quadriplegia, C5-C7, complete (HCC)  Comments: Complicating factor.  Impaired mobility and sensation.    7/29/2022:  -Patient is still spending 7-8 hours/day up in his wheelchair, though he does have appropriate offloading cushion, and knows to reposition frequently.    Please note that this note may have been created using voice recognition software. I have worked with technical experts from [employer] to optimize the interface.  I have made every reasonable attempt to correct obvious errors, but there may be errors of grammar and possibly content that I did not discover before finalizing the note.

## 2022-07-29 NOTE — PATIENT INSTRUCTIONS
-Keep your wound dressing clean, dry, and intact.    -Change your dressing if it becomes soiled, soaked, or falls off.    -Wound vac may not have any drainage in tube or cannister & it will still be working.   Change cannister if it does become full by pressing tab on side of machine to remove canister and snap on new one. Full canister can be thrown in the trash. If cannister fills with bright red blood - go to ER. Dressing will be changed every MWF at the wound clinic.  If you are having issues with your wound VAC, please consider patching leaks, changing the canister, or calling [phone] for troubleshooting. If the wound VAC has been off or un-operational for over 2 hours, call wound care center to inform them and remove all dressings including black foam and replace with normal saline damp gauze.     -Should you experience any significant changes in your wound(s), such as infection (redness, swelling, localized heat, increased pain, fever > 101 F, chills) or have any questions regarding your home care instructions, please contact the wound center at [phone]. If after hours, contact your primary care physician or go to the hospital emergency room.

## 2022-08-05 ENCOUNTER — OFFICE VISIT (OUTPATIENT)
Dept: WOUND CARE | Facility: MEDICAL CENTER | Age: 72
End: 2022-08-05
Attending: INTERNAL MEDICINE
Payer: MEDICARE

## 2022-08-05 VITALS
SYSTOLIC BLOOD PRESSURE: 163 MMHG | RESPIRATION RATE: 18 BRPM | TEMPERATURE: 97.8 F | DIASTOLIC BLOOD PRESSURE: 87 MMHG | OXYGEN SATURATION: 94 % | HEART RATE: 52 BPM

## 2022-08-05 DIAGNOSIS — T14.8XXA DEEP TISSUE INJURY: ICD-10-CM

## 2022-08-05 DIAGNOSIS — L89.154 SACRAL DECUBITUS ULCER, STAGE IV (HCC): ICD-10-CM

## 2022-08-05 DIAGNOSIS — L89.890 PRESSURE INJURY OF LEFT LEG, UNSTAGEABLE (HCC): ICD-10-CM

## 2022-08-05 DIAGNOSIS — T81.31XD POSTOPERATIVE WOUND DEHISCENCE, SUBSEQUENT ENCOUNTER: ICD-10-CM

## 2022-08-05 DIAGNOSIS — G82.53 QUADRIPLEGIA, C5-C7, COMPLETE (HCC): ICD-10-CM

## 2022-08-05 PROCEDURE — 11042 DBRDMT SUBQ TIS 1ST 20SQCM/<: CPT | Mod: XS

## 2022-08-05 PROCEDURE — 11042 DBRDMT SUBQ TIS 1ST 20SQCM/<: CPT | Mod: 59 | Performed by: NURSE PRACTITIONER

## 2022-08-05 PROCEDURE — 11043 DBRDMT MUSC&/FSCA 1ST 20/<: CPT

## 2022-08-05 PROCEDURE — 97605 NEG PRS WND THER DME<=50SQCM: CPT

## 2022-08-05 PROCEDURE — 11043 DBRDMT MUSC&/FSCA 1ST 20/<: CPT | Performed by: NURSE PRACTITIONER

## 2022-08-05 NOTE — PROCEDURES
Wound inspected with flash light. No retained foam noted. Wound VAC changed with 3 black foams. Restarted at 125mmHg continuous. No leak noted.

## 2022-08-05 NOTE — PATIENT INSTRUCTIONS
-Keep your wound dressing clean, dry, and intact.    -Change your dressing if it becomes soiled, soaked, or falls off.    -Wound vac may not have any drainage in tube or cannister & it will still be working.   Change cannister if it does become full by pressing tab on side of machine to remove canister and snap on new one. Full canister can be thrown in the trash. If cannister fills with bright red blood - go to ER. Dressing will be changed every MWF at the wound clinic.  If you are having issues with your wound VAC, please consider patching leaks, changing the canister, or calling [phone] for troubleshooting. If the wound VAC has been off or un-operational for over 2 hours, call wound care center to inform them and remove all dressings including black foam and replace with normal saline damp gauze.     -Should you experience any significant changes in your wound(s), such as infection (redness, swelling, localized heat, increased pain, fever > 101 F, chills) or have any questions regarding your home care instructions, please contact the wound center at [phone]. If after hours, contact your primary care physician or go to the hospital emergency room.

## 2022-08-06 NOTE — PROGRESS NOTES
Provider Encounter- Pressure Injury        HISTORY OF PRESENT ILLNESS  Wound History:    START OF CARE IN CLINIC: 5/3/2022    REFERRING PROVIDER: Sahara Mendez      WOUNDS- Pressure Injury, Sacrococcygeal, stage IV                                                             Surgical wound dehiscence, left medial lower leg-status post surgical I&D of stage IV pressure injury and           biologic application                       Abrasion to left anterior lower leg-first observed in clinic 7/22/2022          Pressure injury, DTI, left posterior lower leg-first observed in clinic 7/29/2022       HISTORY: Patient with history of incomplete quadriplegia referred to [facility] for treatment of a stage IV pressure injury.  He has a history of previous pressure injuries to this area, and underwent muscle flaps in 2019, and then again in 2020.  He was seen in the wound clinic in November 2021 for an ulcer proximal from his current ulcer, and pressure injuries to his left posterior lower leg and left heel.  At that time, it was discovered that the patient had retained VAC foam embedded in the wound bed of the sacral wound.  Attempts were made to get him back to his plastic surgeon, though unsuccessful.  In January he underwent surgical removal of VAC sponge along with excisional debridement of his sacral wound by Dr. Chaves.  After the surgery, his wound went on to heal without incident.   In early April 2022, his home health nurse noted a new sacral ulcer, below the previous ulcer which quickly tripled in size over the following weeks.  The ulcer to his left medial lower leg had also deteriorated, with bone visible at the base..  He was hospitalized from 4/22 until 4/27/2022 and underwent surgery with Dr. Raman on 4/26 for irrigation and debridement of multiple compartments of the left lower extremity, bone excision, and complex closure of chronic wound using biologic skin substitute.   His sacrococcygeal wound was not  surgically addressed during this admission.  He was discharged back to his group home, with home health, and referral to outpatient wound clinic for his sacral wound.  He was instructed to follow-up with his surgeon for his lower leg wound.       Postoperatively, the left medial lower leg incision dehisced.  He was seen by his surgeon at [facility] on 5/11.  The surgeon opted to leave remaining sutures in place, and refer him to the wound clinic for treatment of this wound.   Treatment of this wound was initiated in clinic on 5/12.  During this visit was also noted that his heel DTI had resolved, but that he had a new pressure injury to his left posterior lower extremity.     A new pressure injury was noted to patient's right upper buttock/lower back on 5/20/2022.  Wound was linear in shape, skin discolored but intact.     Abrasion noted to left anterior lower leg.  First observed in clinic on 7/22/2022.  Patient states he bumped his leg into his food tray.     Small DTI noted to patient's left lateral lower leg on 7/29/2022.  Skin intact but discolored.     Large area of deep tissue injury noted to patient's left exterior lower leg.  Patient denied any trauma to this area.  Skin intact.  Wound documented.    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  Never used smokeless tobacco    Patient's problem list, allergies, and current medications reviewed and updated in Epic    Interval History:  Interval History thinned 7/29/2022.  Please see previous notes for complete interval history.     7/29/2022 : Clinic visit with ADILSON Arthur, FNP-BC, CWDINESHN, CFCN.  Turk states he continues to do  well.  He was able to go to [email or web address] yesterday, spent today [place].  His left lower extremity wound has deteriorated, presents today with significant odor and deterioration of tissue.  VAC held from this wound today after debridement.  I also did not apply biologic today.   Sacral wound about the same in area, though some evidence of increasing granulation.  No evidence of infection.  Small abrasion to his left lower leg appears to be improving.  He has a new small DTI to his left lateral lower leg, skin intact but discolored.    8/5/2022 : Clinic visit with Kelly Sandy, APRN, FNP-BC, CWOCN, CFCN.  Anjum continues to feel well.  His left lower wound has improved with VAC hold, will discontinue from this wound altogether.  I did not apply biologic to this wound today given its current improvement.  DTI to posterior left leg is a bit darker today, skin still intact.  Patient states he has been wearing his heel float boot at all times.  Sacral wound with increased granulation, slight decrease in depth, bone still exposed.    REVIEW OF SYSTEMS:   Unchanged from previous clinic visit on 7/29/2022    PHYSICAL EXAMINATION:   BP (!) 163/87 (BP Location: Left arm, Patient Position: Sitting) Comment: RN notified  Pulse (!) 52 Comment: RN notified  Temp 36.6 °C (97.8 °F)   Resp 18   SpO2 94%   Physical Exam  Constitutional:       Appearance: He is obese.   HENT:      Head: Normocephalic.   Eyes:      Pupils: Pupils are equal, round, and reactive to light.   Cardiovascular:      Rate and Rhythm: Bradycardia present.   Pulmonary:      Effort: Pulmonary effort is normal. No respiratory distress.      Breath sounds: No wheezing.   Abdominal:      Palpations: Abdomen is soft.   Skin:     Comments: Coccygeal pressure injury, stage IV.  Wound area unchanged, depth has decreased, increase granulation tissue, no evidence of infection  Full-thickness wound to left medial lower extremity-wound area and depth have decreased, tissue  quality has improved, increase granulation, no odor, periwound intact  Abrasion to left anterior lower leg-covered with stable eschar, no need for debridement  DTI to left lateral lower leg, first observed in clinic on 7/29.  Darker discoloration, skin still intact.   Neurological:      Mental Status: He is alert and oriented to person, place, and time.         WOUND ASSESSMENT  Wound 04/11/22 Full Thickness Wound Leg Medial Left --Left Medial Lower Leg (Active)   Wound Image    08/05/22 1410   Site Assessment Purple;Red;Yellow 08/05/22 1410   Periwound Assessment Edema;Fragile 08/05/22 1410   Margins Unattached edges 08/05/22 1410   Drainage Amount Moderate 08/05/22 1410   Drainage Description Serosanguineous 08/05/22 1410   Treatments Cleansed;Provider debridement 08/05/22 1410   Wound Cleansing [place] 08/05/22 1410   Periwound Protectant Skin Protectant Wipes to Periwound;Barrier Paste 08/05/22 1410   Dressing Cleansing/Solutions Not Applicable 08/05/22 1410   Dressing Options Hydrofiber Silver;Mepilex;Tubigrip 08/05/22 1410   Dressing Changed New 07/29/22 1424   Dressing Status Clean;Dry;Intact 04/11/22 1330   Dressing Change/Treatment Frequency Monday, Wednesday, Friday, and As Needed 07/29/22 1424   Non-staged Wound Description Full thickness 08/05/22 1410   Wound Length (cm) 3.5 cm 08/05/22 1410   Wound Width (cm) 2 cm 08/05/22 1410   Wound Depth (cm) 1 cm 08/05/22 1410   Wound Surface Area (cm^2) 7 cm^2 08/05/22 1410   Wound Volume (cm^3) 7 cm^3 08/05/22 1410   Post-Procedure Length (cm) 3.6 cm 08/05/22 1410   Post-Procedure Width (cm) 2 cm 08/05/22 1410   Post-Procedure Depth (cm) 1 cm 08/05/22 1410   Post-Procedure Surface Area (cm^2) 7.2 cm^2 08/05/22 1410   Post-Procedure Volume (cm^3) 7.2 cm^3 08/05/22 1410   Wound Healing % -[id number] 08/05/22 1410   Tunneling (cm) 0 cm 08/05/22 1410   Tunneling Clock Position of Wound 12 07/22/22 1400   Undermining (cm) 0 cm 08/05/22 1410   Undermining of Wound,  1st Location From 2 o'clock;To 4 o'clock 06/03/22 1300   Wound Odor None 08/05/22 1410   Exposed Structures None 08/05/22 1410   Number of days: 116       Wound 04/22/22 Pressure Injury Sacrum POA stage 4 (Active)   Wound Image    08/05/22 1410   Site Assessment Red;Yellow;White 08/05/22 1410   Periwound Assessment Scar tissue 08/05/22 1410   Margins Unattached edges 08/05/22 1410   Drainage Amount Moderate 08/05/22 1410   Drainage Description Serosanguineous 08/05/22 1410   Treatments Cleansed;Provider debridement 08/05/22 1410   Wound Cleansing Normal Saline Irrigation 08/05/22 1410   Periwound Protectant Skin Protectant Wipes to Periwound;Benzoin;Paste Ring;Drape 08/05/22 1410   Dressing Cleansing/Solutions Not Applicable 08/05/22 1410   Dressing Options Wound Vac;Mepilex 08/05/22 1410   Dressing Changed Changed 07/29/22 1424   Dressing Change/Treatment Frequency Monday, Wednesday, Friday, and As Needed 07/29/22 1424   Pressure Injury Stage 4 08/05/22 1410   Wound Length (cm) 2 cm 08/05/22 1410   Wound Width (cm) 2.2 cm 08/05/22 1410   Wound Depth (cm) 2.1 cm 08/05/22 1410   Wound Surface Area (cm^2) 4.4 cm^2 08/05/22 1410   Wound Volume (cm^3) 9.24 cm^3 08/05/22 1410   Post-Procedure Length (cm) 2 cm 08/05/22 1410   Post-Procedure Width (cm) 2.3 cm 08/05/22 1410   Post-Procedure Depth (cm) 2.1 cm 08/05/22 1410   Post-Procedure Surface Area (cm^2) 4.6 cm^2 08/05/22 1410   Post-Procedure Volume (cm^3) 9.66 cm^3 08/05/22 1410   Wound Healing % -285 08/05/22 1410   Tunneling (cm) 0 cm 07/22/22 1400   Tunneling Clock Position of Wound 12 05/03/22 1600   Undermining (cm) 4 cm 08/05/22 1410   Undermining of Wound, 1st Location From 9 o'clock;To 3 o'clock 08/05/22 1410   Undermining (cm) - 2nd location 3 cm 07/15/22 1400   Undermining of Wound, 2nd Location From 9 o'clock;To 11 o'clock 07/15/22 1400   Undermining (cm) - 3rd location 3.2 cm 06/24/22 1000   Undermining of Wound, 3rd Location From 7 o'clock;To 11 o'clock  06/24/22 1000   Wound Odor None 08/05/22 1410   Exposed Structures Bone 08/05/22 1410   Number of days: 105       Wound 07/22/22 Traumatic Leg Anterior Left (Active)   Wound Image   08/05/22 1410   Site Assessment Black;Brown;Yellow 08/05/22 1410   Periwound Assessment Edema;Fragile 08/05/22 1410   Margins Attached edges 08/05/22 1410   Drainage Amount None 08/05/22 1410   Drainage Description KEN 07/22/22 1400   Treatments Cleansed;Site care 08/05/22 1410   Wound Cleansing Normal Saline Irrigation 08/05/22 1410   Periwound Protectant Skin Protectant Wipes to Periwound 08/05/22 1410   Dressing Cleansing/Solutions Not Applicable 08/05/22 1410   Dressing Options Silicone Adhesive Foam;Tubigrip 08/05/22 1410   Dressing Changed Changed 07/29/22 1424   Dressing Change/Treatment Frequency Monday, Wednesday, Friday, and As Needed 07/29/22 1424   Non-staged Wound Description Full thickness 07/29/22 1424   Wound Length (cm) 0.8 cm 08/05/22 1410   Wound Width (cm) 0.7 cm 08/05/22 1410   Wound Depth (cm) 0 cm 08/05/22 1410   Wound Surface Area (cm^2) 0.56 cm^2 08/05/22 1410   Wound Volume (cm^3) 0 cm^3 08/05/22 1410   Post-Procedure Length (cm) 2.2 cm 07/22/22 1400   Post-Procedure Width (cm) 1 cm 07/22/22 1400   Post-Procedure Surface Area (cm^2) 2.2 cm^2 07/22/22 1400   Tunneling (cm) 0 cm 08/05/22 1410   Undermining (cm) 0 cm 08/05/22 1410   Wound Odor None 08/05/22 1410   Exposed Structures None 08/05/22 1410   Number of days: 14       Wound 07/29/22 Pressure Injury Leg Posterior;Lateral Left (Active)   Wound Image   08/05/22 1410   Site Assessment Purple 08/05/22 1410   Periwound Assessment Blanchable erythema 08/05/22 1410   Drainage Amount None 08/05/22 1410   Drainage Description Serous 07/29/22 1424   Treatments Cleansed;Site care 08/05/22 1410   Wound Cleansing Hydrogen Peroxide 08/05/22 1410   Periwound Protectant Skin Protectant Wipes to Periwound 08/05/22 1410   Dressing Cleansing/Solutions Not Applicable 08/05/22  1410   Dressing Options Mepilex;Tubigrip 08/05/22 1410   Dressing Changed New 07/29/22 1424   Dressing Change/Treatment Frequency Monday, Wednesday, Friday, and As Needed 07/29/22 1424   Pressure Injury Stage DTPI 08/05/22 1410   Wound Length (cm) 0.6 cm 07/29/22 1424   Wound Width (cm) 1 cm 07/29/22 1424   Wound Surface Area (cm^2) 0.6 cm^2 07/29/22 1424   Tunneling (cm) 0 cm 08/05/22 1410   Undermining (cm) 0 cm 08/05/22 1410   Wound Odor None 08/05/22 1410   Exposed Structures None 08/05/22 1410   Number of days: 7         PROCEDURE: Excisional debridement of sacrococcygeal wound  -2% viscous lidocaine applied topically to wound bed for approximately 5 minutes prior to debridement  -Curette used to debride wound bed.  Excisional debridement was performed to remove devitalized tissue until healthy, bleeding tissue was visualized.  Debridement was carried into the undermined areas of the wound.  Total area debrided approximately 15 cm² (including into wound undermining).  Deepest level of debridement was into the muscle layer.  -Bleeding controlled with manual pressure.    -Wound care completed by wound RN, refer to flowsheet  -Patient tolerated the procedure well, without c/o pain or discomfort.       PROCEDURE: Excisional debridement of left medial lower leg full-thickness wound  -2% viscous lidocaine applied topically to wound bed for approximately 5 minutes prior to debridement  -Curette used to debride wound bed.  Excisional debridement was performed to remove devitalized tissue until healthy, bleeding tissue was visualized.  Total area debrided 7.2 cm².  Deepest level of debridement was into the subcutaneous layer.  -Bleeding controlled with manual pressure.  -Biologic was not applied today  -Wound irrigated thoroughly with normal saline  -Wound care completed by wound RN, refer to flowsheet  -Patient tolerated the procedure well, without c/o pain or discomfort.         BIOLOGIC LOG  5/20/2022-first  application.  PRODUCT: EpiCord 2 x 3 cm . PRODUCT #[id number]; EXPIRES: 2026-12-01 5/27/2022- second application.  PRODUCT: EpiCordEX 2 x 3 cm . PRODUCT #[id number]; EXPIRES: 2026-09-01    6/3/2022- third application.  PRODUCT: EpiCordEX 2 x 3 cm . PRODUCT #[id number]; EXPIRES: 2026-10-01    6/10/2022- fourth application.  PRODUCT: EpiCordEX 2 x 3 cm . PRODUCT #EX [id number]; EXPIRES: 2026-09-01 6/17/2022- fifth application.  PRODUCT: EpiCordEX 2 x 3 cm . PRODUCT #EX [id number]; EXPIRES: 2027-02-01 6/24/2022- sixth application.  PRODUCT: EpiCordEX 2 x 3 cm . PRODUCT #[id number]; EXPIRES: 2027-02-01 07/01/22: Seventh application.  Product: Epicort Dx 2.0 x 3.0 cm reorder number [id number]; product number [id number]; expires 2027-02-01 7/22/2022- eighth application.  PRODUCT: EpiCord 2 x 3 cm, reorder #[id number]. PRODUCT #[id number]; EXPIRES: 2027-02-01      Pertinent Labs and Diagnostics:    Labs:     A1c: No results found for: HBA1C     [facility] results, 7/1/2022 (under media tab)    CRP 13    ESR 31      IMAGING: No results found.    VASCULAR STUDIES: No results found.    LAST  WOUND CULTURE:   Lab Results   Component Value Date/Time    CULTRSULT - (A) 07/21/2022 01:00 PM    CULTRSULT Candida tropicalis  10-50,000 cfu/mL   (A) 07/21/2022 01:00 PM          ASSESSMENT AND PLAN:     1. Sacral decubitus ulcer, stage IV (HCC)  Comments: Ulcer first noted in early April 2022 as small open area which quickly enlarged.  This ulcer is present distal from previous sacral ulcer which healed after surgery in January.  Patient has history of flap reconstruction x2 to this area.    8/5/2022: No significant change wound area.  However, depth has decreased slightly, increase granulation tissue, no periwound erythema, no wound odor  -Excisional debridement of wound in clinic today, medically necessary to promote wound healing.  -Patient to return to clinic weekly for  assessment and debridement  -Home health to change VAC dressing 2 times per week in between clinic visits  -Patient would benefit from plastic surgery, however we have not been able to find a surgeon willing to see him.  -Healing of this wound is going to be very slow given patient's comorbidities, size and chronicity of wound.    Wound care: NPWT at 125 mmHg to accelerate granulation, change 3 times per week.      2.  Postoperative wound dehiscence, subsequent encounter  Comments: On 4/26 patient underwent irrigation and debridement of multiple compartments of the left lower extremity states for pressure injury, with bone excision, and complex closure of chronic wound using biologic skin substitute.  During postop visit in surgeons office on 5/11, surgical site was noted to be dehisced.  Patient was referred to wound clinic for management.    8/5/2022: Wound area and depth has decreased significantly since last assessment, quality of tissue in wound bed has improved.  -Excisional debridement of wound in clinic today, medically necessary to promote wound healing.  -Discontinue VAC from this wound altogether  -Topical antimicrobial dressing applied.   -Patient to continue offloading is much as possible  -Patient to return to clinic weekly for assessment and debridement  -Home health to change dressing 1-2 times per week in between clinic visits    Wound care: Silver Hydrofiber to manage exudate and bioburden, foam cover dressing, Hypafix tape      3.  Pressure injury of left leg, deep tissue injury  Comments: DTI to posterior left lower leg first observed in clinic 7/29/2022.  Patient does not know how it started, had been wearing heel float boot consistently.    8/5/2022: Tissue remains intact, however somewhat darker in color.  He has continued to wear heel float boot, which protects this area of his lower leg.  -No need for debridement  -We will continue to monitor  -Patient is at high risk for pressure injury  development due to immobility, lack of sensation, paraplegia    Wound care: Mepilex silicone dressing to protect area from pressure, friction and shear      4. Quadriplegia, C5-C7, complete (HCC)  Comments: Complicating factor.  Impaired mobility and sensation.    8/5/2022:  -Patient is still spending 7-8 hours/day up in his wheelchair, though he does have appropriate offloading cushion, and knows to reposition frequently.    Please note that this note may have been created using voice recognition software. I have worked with technical experts from [employer] to optimize the interface.  I have made every reasonable attempt to correct obvious errors, but there may be errors of grammar and possibly content that I did not discover before finalizing the note.

## 2022-08-08 ENCOUNTER — TELEPHONE (OUTPATIENT)
Dept: WOUND CARE | Facility: MEDICAL CENTER | Age: 72
End: 2022-08-08
Payer: MEDICARE

## 2022-08-08 NOTE — TELEPHONE ENCOUNTER
[facility] RN called stating sacral wound is odorous even  after numerous cleaning with saline. Advised Yadira if other signs of infection are present that Turk should go to Emergency Room. Provider to evaluate at clinic appointment of Friday, 8/12/2022.

## 2022-08-12 ENCOUNTER — NON-PROVIDER VISIT (OUTPATIENT)
Dept: CARDIOLOGY | Facility: MEDICAL CENTER | Age: 72
End: 2022-08-12

## 2022-08-12 ENCOUNTER — OFFICE VISIT (OUTPATIENT)
Dept: WOUND CARE | Facility: MEDICAL CENTER | Age: 72
End: 2022-08-12
Attending: INTERNAL MEDICINE
Payer: MEDICARE

## 2022-08-12 VITALS
RESPIRATION RATE: 18 BRPM | DIASTOLIC BLOOD PRESSURE: 67 MMHG | HEART RATE: 56 BPM | OXYGEN SATURATION: 94 % | SYSTOLIC BLOOD PRESSURE: 118 MMHG | TEMPERATURE: 96.9 F

## 2022-08-12 DIAGNOSIS — L89.154 SACRAL DECUBITUS ULCER, STAGE IV (HCC): Primary | ICD-10-CM

## 2022-08-12 DIAGNOSIS — L89.890 PRESSURE INJURY OF LEFT LEG, UNSTAGEABLE (HCC): ICD-10-CM

## 2022-08-12 DIAGNOSIS — T81.31XD POSTOPERATIVE WOUND DEHISCENCE, SUBSEQUENT ENCOUNTER: ICD-10-CM

## 2022-08-12 DIAGNOSIS — G82.53 QUADRIPLEGIA, C5-C7, COMPLETE (HCC): ICD-10-CM

## 2022-08-12 DIAGNOSIS — S81.802D WOUND OF LEFT LOWER EXTREMITY, SUBSEQUENT ENCOUNTER: ICD-10-CM

## 2022-08-12 PROCEDURE — 97605 NEG PRS WND THER DME<=50SQCM: CPT

## 2022-08-12 PROCEDURE — 11043 DBRDMT MUSC&/FSCA 1ST 20/<: CPT | Performed by: NURSE PRACTITIONER

## 2022-08-12 PROCEDURE — 93298 REM INTERROG DEV EVAL SCRMS: CPT | Performed by: INTERNAL MEDICINE

## 2022-08-12 PROCEDURE — 11043 DBRDMT MUSC&/FSCA 1ST 20/<: CPT

## 2022-08-12 PROCEDURE — 11042 DBRDMT SUBQ TIS 1ST 20SQCM/<: CPT | Mod: 59 | Performed by: NURSE PRACTITIONER

## 2022-08-12 PROCEDURE — 11042 DBRDMT SUBQ TIS 1ST 20SQCM/<: CPT | Mod: XS

## 2022-08-12 NOTE — PATIENT INSTRUCTIONS
-Keep your wound dressing clean, dry, and intact.    -Change your dressing if it becomes soiled, soaked, or falls off.    -Wound vac may not have any drainage in tube or cannister & it will still be working.   Change cannister if it does become full by pressing tab on side of machine to remove canister and snap on new one. Full canister can be thrown in the trash. If cannister fills with bright red blood - go to ER. Dressing will be changed every MWF at the wound clinic.  If you are having issues with your wound VAC, please consider patching leaks, changing the canister, or calling [phone] for troubleshooting. If the wound VAC has been off or un-operational for over 2 hours, call wound care center to inform them and remove all dressings including black foam and replace with normal saline damp gauze.     -Should you experience any significant changes in your wound(s), such as infection (redness, swelling, localized heat, increased pain, fever > 101 F, chills) or have any questions regarding your home care instructions, please contact the wound center at [phone]. If after hours, contact your primary care physician or go to the hospital emergency room.

## 2022-08-12 NOTE — PROGRESS NOTES
Provider Encounter- Pressure Injury        HISTORY OF PRESENT ILLNESS  Wound History:    START OF CARE IN CLINIC: 5/3/2022    REFERRING PROVIDER: Sahara Mendez      WOUNDS- Pressure Injury, Sacrococcygeal, stage IV                                                             Surgical wound dehiscence, left medial lower leg-status post surgical I&D of stage IV pressure injury and           biologic application                       Abrasion to left anterior lower leg-first observed in clinic 7/22/2022          Pressure injury, DTI, left posterior lower leg-first observed in clinic 7/29/2022       HISTORY: Patient with history of incomplete quadriplegia referred to [facility] for treatment of a stage IV pressure injury.  He has a history of previous pressure injuries to this area, and underwent muscle flaps in 2019, and then again in 2020.  He was seen in the wound clinic in November 2021 for an ulcer proximal from his current ulcer, and pressure injuries to his left posterior lower leg and left heel.  At that time, it was discovered that the patient had retained VAC foam embedded in the wound bed of the sacral wound.  Attempts were made to get him back to his plastic surgeon, though unsuccessful.  In January he underwent surgical removal of VAC sponge along with excisional debridement of his sacral wound by Dr. Chaves.  After the surgery, his wound went on to heal without incident.   In early April 2022, his home health nurse noted a new sacral ulcer, below the previous ulcer which quickly tripled in size over the following weeks.  The ulcer to his left medial lower leg had also deteriorated, with bone visible at the base..  He was hospitalized from 4/22 until 4/27/2022 and underwent surgery with Dr. Raman on 4/26 for irrigation and debridement of multiple compartments of the left lower extremity, bone excision, and complex closure of chronic wound using biologic skin substitute.   His sacrococcygeal wound was not  surgically addressed during this admission.  He was discharged back to his group home, with home health, and referral to outpatient wound clinic for his sacral wound.  He was instructed to follow-up with his surgeon for his lower leg wound.       Postoperatively, the left medial lower leg incision dehisced.  He was seen by his surgeon at [facility] on 5/11.  The surgeon opted to leave remaining sutures in place, and refer him to the wound clinic for treatment of this wound.   Treatment of this wound was initiated in clinic on 5/12.  During this visit was also noted that his heel DTI had resolved, but that he had a new pressure injury to his left posterior lower extremity.     A new pressure injury was noted to patient's right upper buttock/lower back on 5/20/2022.  Wound was linear in shape, skin discolored but intact.     Abrasion noted to left anterior lower leg.  First observed in clinic on 7/22/2022.  Patient states he bumped his leg into his food tray.     Small DTI noted to patient's left lateral lower leg on 7/29/2022.  Skin intact but discolored.     Large area of deep tissue injury noted to patient's left exterior lower leg.  Patient denied any trauma to this area.  Skin intact.  Wound documented.    Pertinent Medical History: Incomplete quadriplegia, history of stage IV pressure injuries, history of flap procedures to sacral pressure injuries, osteomyelitis, obesity, colostomy in place   Contributing factors: Immobility and Obesity, impaired sensation    Personal support: Attendant-staff at senior care and home health nursing    TOBACCO USE:   Former smoker, quit in 1977.  Never used smokeless tobacco    Patient's problem list, allergies, and current medications reviewed and updated in Epic    Interval History:  Interval History thinned 7/29/2022.  Please see previous notes for complete interval history.     7/29/2022 : Clinic visit with ADILSON Arthur, FNP-BC, CWDINESHN, CFCN.  Turk states he continues to do  well.  He was able to go to [employer] yesterday, spent today [place].  His left lower extremity wound has deteriorated, presents today with significant odor and deterioration of tissue.  VAC held from this wound today after debridement.  I also did not apply biologic today.   Sacral wound about the same in area, though some evidence of increasing granulation.  No evidence of infection.  Small abrasion to his left lower leg appears to be improving.  He has a new small DTI to his left lateral lower leg, skin intact but discolored.    8/5/2022 : Clinic visit with ADILSON Arthur, HOLDEN, ALEXN, CFTRACI.  Anjum continues to feel well.  His left lower wound has improved with VAC hold, will discontinue from this wound altogether.  I did not apply biologic to this wound today given its current improvement.  DTI to posterior left leg is a bit darker today, skin still intact.  Patient states he has been wearing his heel float boot at all times.  Sacral wound with increased granulation, slight decrease in depth, bone still exposed.    8/12/2022 : Clinic visit with ADILSON Arthur, HOLDEN, IMAN, CFTRACI.   Anjum states he is feeling well.  His left lower leg wound continues to improve without the use of VAC or biologic.  The deep tissue injury to his posterior leg is gradually improving, area decreasing.  The abrasion to his left shin is now open, was covered with scab last week.  Sacral wound improving slowly, increase granulation, slight decrease in area.  Home health had messaged that they were concerned for infection due to odor.  I did not appreciate any significant odor today.  We did add Puracyn gel to the wound bed prior to reapplying VAC.  Anjum continues to spend most of his day up in his wheelchair, though tries to reposition frequently, and is wearing heel float boots bilaterally at all times.    REVIEW OF SYSTEMS:   Unchanged from previous clinic visit on 8/5/2022    PHYSICAL EXAMINATION:   /67 (BP Location:  Right arm, Patient Position: Sitting)   Pulse (!) 56   Temp 36.1 °C (96.9 °F) (Temporal)   Resp 18   SpO2 94%   Physical Exam  Constitutional:       Appearance: He is obese.   HENT:      Head: Normocephalic.   Eyes:      Pupils: Pupils are equal, round, and reactive to light.   Cardiovascular:      Rate and Rhythm: Bradycardia present.   Pulmonary:      Effort: Pulmonary effort is normal. No respiratory distress.      Breath sounds: No wheezing.   Abdominal:      Palpations: Abdomen is soft.   Skin:     Comments: Coccygeal pressure injury, stage IV.  Wound area slightly changed, increase granulation, moderate serosanguineous drainage, no odor  Full-thickness wound to left medial lower extremity-area and depth have decreased since last assessment, periwound intact.  Appears to be doing better without NP WT and biologic  Abrasion to left anterior lower leg-now open, thin layer of slough to wound bed, minimal serosanguineous drainage, no periwound erythema  DTI to left lateral lower leg,-area has decreased, skin beginning to lighten.  Small pinpoint opening   Neurological:      Mental Status: He is alert and oriented to person, place, and time.       WOUND ASSESSMENT  Wound 04/11/22 Full Thickness Wound Leg Medial Left --Left Medial Lower Leg (Active)   Wound Image    08/12/22 1300   Site Assessment Purple;Red;Yellow 08/12/22 1300   Periwound Assessment Edema;Fragile 08/12/22 1300   Margins Unattached edges 08/12/22 1300   Drainage Amount Moderate 08/12/22 1300   Drainage Description Serosanguineous 08/12/22 1300   Treatments Cleansed;Provider debridement 08/12/22 1300   Wound Cleansing Normal Saline Irrigation 08/12/22 1300   Periwound Protectant Skin Protectant Wipes to Periwound;Barrier Paste 08/12/22 1300   Dressing Cleansing/Solutions Not Applicable 08/12/22 1300   Dressing Options Hydrofiber Silver;Mepilex;Tubigrip 08/12/22 1300   Dressing Changed New 07/29/22 8585   Dressing Status Clean;Dry;Intact 04/11/22  1330   Dressing Change/Treatment Frequency Monday, Wednesday, Friday, and As Needed 07/29/22 1424   Non-staged Wound Description Full thickness 08/12/22 1300   Wound Length (cm) 3.3 cm 08/12/22 1300   Wound Width (cm) 1.5 cm 08/12/22 1300   Wound Depth (cm) 0.2 cm 08/12/22 1300   Wound Surface Area (cm^2) 4.95 cm^2 08/12/22 1300   Wound Volume (cm^3) 0.99 cm^3 08/12/22 1300   Post-Procedure Length (cm) 3.3 cm 08/12/22 1300   Post-Procedure Width (cm) 1.7 cm 08/12/22 1300   Post-Procedure Depth (cm) 0.2 cm 08/12/22 1300   Post-Procedure Surface Area (cm^2) 5.61 cm^2 08/12/22 1300   Post-Procedure Volume (cm^3) 1.122 cm^3 08/12/22 1300   Wound Healing % -1963 08/12/22 1300   Tunneling (cm) 0 cm 08/12/22 1300   Tunneling Clock Position of Wound 12 07/22/22 1400   Undermining (cm) 0 cm 08/12/22 1300   Undermining of Wound, 1st Location From 2 o'clock;To 4 o'clock 06/03/22 1300   Wound Odor None 08/12/22 1300   Exposed Structures None 08/12/22 1300   Number of days: 123       Wound 04/22/22 Pressure Injury Sacrum POA stage 4 (Active)   Wound Image    08/12/22 1300   Site Assessment Red;Yellow;White 08/12/22 1300   Periwound Assessment Scar tissue 08/12/22 1300   Margins Unattached edges 08/12/22 1300   Drainage Amount Moderate 08/12/22 1300   Drainage Description Serosanguineous 08/12/22 1300   Treatments Cleansed;Provider debridement 08/12/22 1300   Wound Cleansing Puracyn Lamont 08/12/22 1300   Periwound Protectant Skin Protectant Wipes to Periwound;Benzoin;Paste Ring;Drape 08/05/22 1410   Dressing Cleansing/Solutions Not Applicable 08/12/22 1300   Dressing Options Puracyn Gel;Wound Vac;Mepilex 08/12/22 1300   Dressing Changed Changed 07/29/22 1424   Dressing Change/Treatment Frequency Monday, Wednesday, Friday, and As Needed 07/29/22 1424   WOUND NURSE ONLY - Pressure Injury Stage 4 08/12/22 1300   Wound Length (cm) 2 cm 08/12/22 1300   Wound Width (cm) 2.3 cm 08/12/22 1300   Wound Depth (cm) 2.3 cm 08/12/22 1300    Wound Surface Area (cm^2) 4.6 cm^2 08/12/22 1300   Wound Volume (cm^3) 10.58 cm^3 08/12/22 1300   Post-Procedure Length (cm) 2 cm 08/12/22 1300   Post-Procedure Width (cm) 2.3 cm 08/12/22 1300   Post-Procedure Depth (cm) 2.4 cm 08/12/22 1300   Post-Procedure Surface Area (cm^2) 4.6 cm^2 08/12/22 1300   Post-Procedure Volume (cm^3) 11.04 cm^3 08/12/22 1300   Wound Healing % -341 08/12/22 1300   Tunneling (cm) 0 cm 08/12/22 1300   Tunneling Clock Position of Wound 12 05/03/22 1600   Undermining (cm) 3 cm 08/12/22 1300   Undermining of Wound, 1st Location From 9 o'clock;To 3 o'clock 08/12/22 1300   Undermining (cm) - 2nd location 3 cm 07/15/22 1400   Undermining of Wound, 2nd Location From 9 o'clock;To 11 o'clock 07/15/22 1400   Undermining (cm) - 3rd location 3.2 cm 06/24/22 1000   Undermining of Wound, 3rd Location From 7 o'clock;To 11 o'clock 06/24/22 1000   Wound Odor None 08/12/22 1300   Exposed Structures Bone 08/12/22 1300   Number of days: 112       Wound 07/22/22 Traumatic Leg Anterior Left (Active)   Wound Image    08/12/22 1300   Site Assessment Red;Yellow 08/12/22 1300   Periwound Assessment Edema;Fragile 08/12/22 1300   Margins Attached edges 08/12/22 1300   Drainage Amount Small 08/12/22 1300   Drainage Description Serosanguineous 08/12/22 1300   Treatments Cleansed;Provider debridement 08/12/22 1300   Wound Cleansing Normal Saline Irrigation 08/12/22 1300   Periwound Protectant Skin Protectant Wipes to Periwound;Barrier Paste 08/12/22 1300   Dressing Cleansing/Solutions Not Applicable 08/12/22 1300   Dressing Options Hydrofiber Silver;Silicone Adhesive Foam;Tubigrip 08/12/22 1300   Dressing Changed Changed 07/29/22 1424   Dressing Change/Treatment Frequency Monday, Wednesday, Friday, and As Needed 07/29/22 1424   Non-staged Wound Description Full thickness 08/12/22 1300   Wound Length (cm) 1.2 cm 08/12/22 1300   Wound Width (cm) 0.7 cm 08/12/22 1300   Wound Depth (cm) 0.1 cm 08/12/22 1300   Wound  Surface Area (cm^2) 0.84 cm^2 08/12/22 1300   Wound Volume (cm^3) 0.084 cm^3 08/12/22 1300   Post-Procedure Length (cm) 0.9 cm 08/12/22 1300   Post-Procedure Width (cm) 0.5 cm 08/12/22 1300   Post-Procedure Depth (cm) 0.1 cm 08/12/22 1300   Post-Procedure Surface Area (cm^2) 0.45 cm^2 08/12/22 1300   Post-Procedure Volume (cm^3) 0.045 cm^3 08/12/22 1300   Tunneling (cm) 0 cm 08/12/22 1300   Undermining (cm) 0 cm 08/12/22 1300   Wound Odor None 08/12/22 1300   Exposed Structures None 08/12/22 1300   Number of days: 21       Wound 07/29/22 Pressure Injury Leg Posterior;Lateral Left (Active)   Wound Image   08/12/22 1300   Site Assessment Red;Purple 08/12/22 1300   Periwound Assessment Blanchable erythema 08/12/22 1300   Drainage Amount Scant 08/12/22 1300   Drainage Description Sanguineous 08/12/22 1300   Treatments Cleansed;Site care 08/12/22 1300   Wound Cleansing Normal Saline Irrigation 08/12/22 1300   Periwound Protectant Skin Protectant Wipes to Periwound 08/12/22 1300   Dressing Cleansing/Solutions Not Applicable 08/12/22 1300   Dressing Options Mepilex;Tubigrip 08/12/22 1300   Dressing Changed New 07/29/22 1424   Dressing Change/Treatment Frequency Monday, Wednesday, Friday, and As Needed 07/29/22 1424   WOUND NURSE ONLY - Pressure Injury Stage DTPI 08/12/22 1300   Wound Length (cm) 0.1 cm 08/12/22 1300   Wound Width (cm) 0.1 cm 08/12/22 1300   Wound Depth (cm) 0.1 cm 08/12/22 1300   Wound Surface Area (cm^2) 0.01 cm^2 08/12/22 1300   Wound Volume (cm^3) 0.001 cm^3 08/12/22 1300   Tunneling (cm) 0 cm 08/12/22 1300   Undermining (cm) 0 cm 08/12/22 1300   Wound Odor None 08/12/22 1300   Exposed Structures None 08/12/22 1300   Number of days: 14         PROCEDURE: Excisional debridement of sacrococcygeal wound  -2% viscous lidocaine applied topically to wound bed for approximately 5 minutes prior to debridement  -Curette used to debride wound bed.  Excisional debridement was performed to remove devitalized tissue  until healthy, bleeding tissue was visualized.  Debridement was carried into the undermined areas of the wound.  Total area debrided approximately 13 cm² (including into wound undermining).  Deepest level of debridement was into the muscle layer.  -Bleeding controlled with manual pressure.    -Wound care completed by wound RN, refer to flowsheet  -Patient tolerated the procedure well, without c/o pain or discomfort.       PROCEDURE: Excisional debridement of left medial lower leg full-thickness wound, and left anterior lower leg wound  -2% viscous lidocaine applied topically to wound beds for approximately 5 minutes prior to debridement  -Curette used to debride wound beds.  Excisional debridement was performed to remove devitalized tissue until healthy, bleeding tissue was visualized.  Total area debrided 6.06cm².  Deepest level of debridement was into the subcutaneous layer.  -Bleeding controlled with manual pressure.  -Wound care completed by wound RN, refer to flowsheet  -Patient tolerated the procedure well, without c/o pain or discomfort.         BIOLOGIC LOG  5/20/2022-first application.  PRODUCT: EpiCord 2 x 3 cm . PRODUCT #[id number]; EXPIRES: 2026-12-01 5/27/2022- second application.  PRODUCT: EpiCordEX 2 x 3 cm . PRODUCT #[id number]; EXPIRES: 2026-09-01    6/3/2022- third application.  PRODUCT: EpiCordEX 2 x 3 cm . PRODUCT #[id number]; EXPIRES: 2026-10-01    6/10/2022- fourth application.  PRODUCT: EpiCordEX 2 x 3 cm . PRODUCT #[id number]; EXPIRES: 2026-09-01 6/17/2022- fifth application.  PRODUCT: EpiCordEX 2 x 3 cm . PRODUCT #[id number]; EXPIRES: 2027-02-01 6/24/2022- sixth application.  PRODUCT: EpiCordEX 2 x 3 cm . PRODUCT #[id number]; EXPIRES: 2027-02-01 07/01/22: Seventh application.  Product: Epicort Dx 2.0 x 3.0 cm reorder number [id number]; product number [id number]; expires 2027-02-01 7/22/2022- eighth application.  PRODUCT:  EpiCord 2 x 3 cm, reorder #[id number]. PRODUCT #[id number]; EXPIRES: 2027-02-01      Pertinent Labs and Diagnostics:    Labs:     A1c: No results found for: HBA1C     [facility] results, 7/1/2022 (under media tab)    CRP 13    ESR 31      IMAGING: No results found.    VASCULAR STUDIES: No results found.    LAST  WOUND CULTURE:   Lab Results   Component Value Date/Time    CULTRSULT - (A) 07/21/2022 01:00 PM    CULTRSULT Candida tropicalis  10-50,000 cfu/mL   (A) 07/21/2022 01:00 PM          ASSESSMENT AND PLAN:     1. Sacral decubitus ulcer, stage IV ([facility])  Comments: Ulcer first noted in early April 2022 as small open area which quickly enlarged.  This ulcer is present distal from previous sacral ulcer which healed after surgery in January.  Patient has history of flap reconstruction x2 to this area.    8/12/2022: Wound improving, though very slowly.  Slight decrease in undermining depth, increase granulation, no evidence of infection.  -Excisional debridement of wound in clinic today, medically necessary to promote wound healing.  -Patient to return to clinic weekly for assessment and debridement  -Home health to change VAC dressing 2 times per week in between clinic visits  -Patient would benefit from plastic surgery, however we have not been able to find a surgeon willing to see him.  -Healing of this wound is going to be very slow given patient's comorbidities, size and chronicity of wound.    Wound care: Puracyn gel to wound bed, NPWT at 125 mmHg to accelerate granulation, change 3 times per week.      2.  Postoperative wound dehiscence, subsequent encounter  Comments: On 4/26 patient underwent irrigation and debridement of multiple compartments of the left lower extremity states for pressure injury, with bone excision, and complex closure of chronic wound using biologic skin substitute.  During postop visit in surgeons office on 5/11, surgical site was noted to be dehisced.  Patient was referred to wound  clinic for management.    8/12/2022: Wound area and depth continue to decrease, periwound remains intact, no signs or symptoms of infection  -Excisional debridement of wound in clinic today, medically necessary to promote wound healing.  -Topical antimicrobial dressing applied.   -Patient to return to clinic weekly for assessment and debridement  -Home health to change dressing 1-2 times per week in between clinic visits    Wound care: Silver Hydrofiber to manage exudate and bioburden, foam cover dressing, Hypafix tape      3.  Pressure injury of left leg, deep tissue injury  Comments: DTI to posterior left lower leg first observed in clinic 7/29/2022.  Patient does not know how it started, had been wearing heel float boot consistently.    8/12/2022: Tissue remains intact, area has decreased, tissue beginning to lighten.  Small pinpoint opening with scant drainage  -No need for debridement  -We will continue to monitor  -Patient is at high risk for pressure injury development due to immobility, lack of sensation, paraplegia    Wound care: Mepilex silicone dressing to protect area from pressure, friction and shear      4.  Wound of left lower extremity, subsequent encounter  Comments: Noted to be open on 8/12/2022, had previously been covered with stable dry scab.    8/12/2022: Wound is now open, full-thickness with scant drainage  -Excisional debridement of wound in clinic today, medically necessary to promote wound healing.  -Patient to return to clinic weekly for assessment and debridement  -Home health to change dressing 1-2 times per week in between clinic visits     Wound care: Silver Hydrofiber to manage exudate and bioburden, foam cover dressing, Hypafix tape     5. Quadriplegia, C5-C7, complete (HCC)  Comments: Complicating factor.  Impaired mobility and sensation.      -Patient is still spending 7-8 hours/day up in his wheelchair, though he does have appropriate offloading cushion, and knows to reposition  frequently.  Wears heel float boots bilaterally at all times    Please note that this note may have been created using voice recognition software. I have worked with technical experts from [facility] to optimize the interface.  I have made every reasonable attempt to correct obvious errors, but there may be errors of grammar and possibly content that I did not discover before finalizing the note.

## 2022-08-15 NOTE — CARDIAC REMOTE MONITOR - SCAN
Device transmission reviewed. Device demonstrated appropriate function.       Electronically Signed by: Elías Merino M.D.    8/17/2022  7:48 AM

## 2022-08-19 ENCOUNTER — OFFICE VISIT (OUTPATIENT)
Dept: WOUND CARE | Facility: MEDICAL CENTER | Age: 72
End: 2022-08-19
Attending: INTERNAL MEDICINE
Payer: MEDICARE

## 2022-08-19 VITALS
DIASTOLIC BLOOD PRESSURE: 68 MMHG | RESPIRATION RATE: 18 BRPM | HEART RATE: 57 BPM | SYSTOLIC BLOOD PRESSURE: 122 MMHG | OXYGEN SATURATION: 97 % | TEMPERATURE: 97.8 F

## 2022-08-19 DIAGNOSIS — S81.802D WOUND OF LEFT LOWER EXTREMITY, SUBSEQUENT ENCOUNTER: ICD-10-CM

## 2022-08-19 DIAGNOSIS — L89.154 SACRAL DECUBITUS ULCER, STAGE IV (HCC): ICD-10-CM

## 2022-08-19 PROCEDURE — 11043 DBRDMT MUSC&/FSCA 1ST 20/<: CPT

## 2022-08-19 PROCEDURE — 11043 DBRDMT MUSC&/FSCA 1ST 20/<: CPT | Performed by: NURSE PRACTITIONER

## 2022-08-19 PROCEDURE — 11042 DBRDMT SUBQ TIS 1ST 20SQCM/<: CPT | Mod: 59 | Performed by: NURSE PRACTITIONER

## 2022-08-19 PROCEDURE — 97605 NEG PRS WND THER DME<=50SQCM: CPT

## 2022-08-19 PROCEDURE — 11042 DBRDMT SUBQ TIS 1ST 20SQCM/<: CPT | Mod: XS

## 2022-08-19 NOTE — PROGRESS NOTES
Provider Encounter- Pressure Injury        HISTORY OF PRESENT ILLNESS  Wound History:    START OF CARE IN CLINIC: 5/3/2022    REFERRING PROVIDER: Sahara Mendez      WOUNDS- Pressure Injury, Sacrococcygeal, stage IV                                                             Surgical wound dehiscence, left medial lower leg-status post surgical I&D of stage IV pressure injury and           biologic application                       Abrasion to left anterior lower leg-first observed in clinic 7/22/2022          Pressure injury, DTI, left posterior lower leg-first observed in clinic 7/29/2022       HISTORY: Patient with history of incomplete quadriplegia referred to [facility] for treatment of a stage IV pressure injury.  He has a history of previous pressure injuries to this area, and underwent muscle flaps in 2019, and then again in 2020.  He was seen in the wound clinic in November 2021 for an ulcer proximal from his current ulcer, and pressure injuries to his left posterior lower leg and left heel.  At that time, it was discovered that the patient had retained VAC foam embedded in the wound bed of the sacral wound.  Attempts were made to get him back to his plastic surgeon, though unsuccessful.  In January he underwent surgical removal of VAC sponge along with excisional debridement of his sacral wound by Dr. Chaves.  After the surgery, his wound went on to heal without incident.   In early April 2022, his home health nurse noted a new sacral ulcer, below the previous ulcer which quickly tripled in size over the following weeks.  The ulcer to his left medial lower leg had also deteriorated, with bone visible at the base..  He was hospitalized from 4/22 until 4/27/2022 and underwent surgery with Dr. Raman on 4/26 for irrigation and debridement of multiple compartments of the left lower extremity, bone excision, and complex closure of chronic wound using biologic skin substitute.   His sacrococcygeal wound was not  surgically addressed during this admission.  He was discharged back to his group home, with home health, and referral to outpatient wound clinic for his sacral wound.  He was instructed to follow-up with his surgeon for his lower leg wound.       Postoperatively, the left medial lower leg incision dehisced.  He was seen by his surgeon at [facility] on 5/11.  The surgeon opted to leave remaining sutures in place, and refer him to the wound clinic for treatment of this wound.   Treatment of this wound was initiated in clinic on 5/12.  During this visit was also noted that his heel DTI had resolved, but that he had a new pressure injury to his left posterior lower extremity.     A new pressure injury was noted to patient's right upper buttock/lower back on 5/20/2022.  Wound was linear in shape, skin discolored but intact.     Abrasion noted to left anterior lower leg.  First observed in clinic on 7/22/2022.  Patient states he bumped his leg into his food tray.     Small DTI noted to patient's left lateral lower leg on 7/29/2022.  Skin intact but discolored.     Large area of deep tissue injury noted to patient's left exterior lower leg.  Patient denied any trauma to this area.  Skin intact.  Wound documented.    Pertinent Medical History: Incomplete quadriplegia, history of stage IV pressure injuries, history of flap procedures to sacral pressure injuries, osteomyelitis, obesity, colostomy in place   Contributing factors: Immobility and Obesity, impaired sensation    Personal support: Attendant-staff at halfway and home health nursing    TOBACCO USE:   Former smoker, quit in 1977.  Never used smokeless tobacco    Patient's problem list, allergies, and current medications reviewed and updated in Epic    Interval History:  Interval History thinned 7/29/2022.  Please see previous notes for complete interval history.     7/29/2022 : Clinic visit with ADILSON Arthur, FNP-BC, CWDINESHN, CFCN.  Turk states he continues to do  well.  He was able to go to [employer] yesterday, spent today [place].  His left lower extremity wound has deteriorated, presents today with significant odor and deterioration of tissue.  VAC held from this wound today after debridement.  I also did not apply biologic today.   Sacral wound about the same in area, though some evidence of increasing granulation.  No evidence of infection.  Small abrasion to his left lower leg appears to be improving.  He has a new small DTI to his left lateral lower leg, skin intact but discolored.    8/5/2022 : Clinic visit with ADILSON Arthur, HOLDEN, IMAN, LILIYA.  Anjum continues to feel well.  His left lower wound has improved with VAC hold, will discontinue from this wound altogether.  I did not apply biologic to this wound today given its current improvement.  DTI to posterior left leg is a bit darker today, skin still intact.  Patient states he has been wearing his heel float boot at all times.  Sacral wound with increased granulation, slight decrease in depth, bone still exposed.    8/12/2022 : Clinic visit with ADILSON Arthur, HOLDEN, IMAN, LILIYA.   Anjum states he is feeling well.  His left lower leg wound continues to improve without the use of VAC or biologic.  The deep tissue injury to his posterior leg is gradually improving, area decreasing.  The abrasion to his left shin is now open, was covered with scab last week.  Sacral wound improving slowly, increase granulation, slight decrease in area.  Home health had messaged that they were concerned for infection due to odor.  I did not appreciate any significant odor today.  We did add Puracyn gel to the wound bed prior to reapplying VAC.  Anjum continues to spend most of his day up in his wheelchair, though tries to reposition frequently, and is wearing heel float boots bilaterally at all times.    8/19/2022 : Clinic visit with ADILSON Arthur, HOLDEN, IMAN, LILIYA.   Anjum states he is in his usual state of health,  feeling well overall.  All of his wounds are progressing, though very slowly.  He continues to spend most of the day up in his wheelchair.  He does know to shift his weight frequently, and has an appropriate pressure relief cushion in his chair.    He was seen by [facility] earlier this week, for complaints of leakage around his suprapubic catheter and fever.  He was prescribed Keflex, and his catheter was changed    REVIEW OF SYSTEMS:   Unchanged from previous clinic visit on 8/12/2022    PHYSICAL EXAMINATION:   /68 (BP Location: Left arm, Patient Position: Sitting)   Pulse (!) 57   Temp 36.6 °C (97.8 °F) (Temporal)   Resp 18   SpO2 97%   Physical Exam  Constitutional:       Appearance: He is obese.   HENT:      Head: Normocephalic.   Eyes:      Pupils: Pupils are equal, round, and reactive to light.   Cardiovascular:      Rate and Rhythm: Bradycardia present.   Pulmonary:      Effort: Pulmonary effort is normal. No respiratory distress.      Breath sounds: No wheezing.   Abdominal:      Palpations: Abdomen is soft.   Skin:     Comments: Stage IV coccygeal pressure injury-wound area has decreased, increase granulation, moderate serosanguineous drainage, no wound odor.  Periwound remains intact, no erythema.  Full-thickness wound to left medial lower leg-area and depth have again decreased, periwound intact.  Progressing well without the use of biologic or NP WT.  Abrasion to left anterior lower leg-area has decreased since last assessment, minimal serosanguineous drainage, area has decreased  Left lateral lower leg DTI-remains stable, skin intact   Neurological:      Mental Status: He is alert and oriented to person, place, and time.       WOUND ASSESSMENT  Wound 04/11/22 Full Thickness Wound Leg Medial Left --Left Medial Lower Leg (Active)   Wound Image    08/19/22 1400   Site Assessment Purple;Red;Yellow 08/19/22 1400   Periwound Assessment Edema;Fragile 08/19/22 1400   Margins Unattached  edges;Attached edges 08/19/22 1400   Drainage Amount Moderate 08/19/22 1400   Drainage Description Serosanguineous 08/19/22 1400   Treatments Cleansed;Topical Lidocaine;Provider debridement;Site care 08/19/22 1400   Wound Cleansing Puracyn [place] 08/19/22 1400   Periwound Protectant Skin Protectant Wipes to Periwound;Barrier Paste 08/19/22 1400   Dressing Cleansing/Solutions Not Applicable 08/19/22 1400   Dressing Options Hydrofiber Silver;Mepilex;Tubigrip 08/19/22 1400   Dressing Changed Changed 08/19/22 1400   Dressing Status Clean;Dry;Intact 04/11/22 1330   Dressing Change/Treatment Frequency Monday, Wednesday, Friday, and As Needed 07/29/22 1424   Non-staged Wound Description Full thickness 08/19/22 1400   Wound Length (cm) 3.1 cm 08/19/22 1400   Wound Width (cm) 1.7 cm 08/19/22 1400   Wound Depth (cm) 0.3 cm 08/19/22 1400   Wound Surface Area (cm^2) 5.27 cm^2 08/19/22 1400   Wound Volume (cm^3) 1.581 cm^3 08/19/22 1400   Post-Procedure Length (cm) 3 cm 08/19/22 1400   Post-Procedure Width (cm) 1.6 cm 08/19/22 1400   Post-Procedure Depth (cm) 0.3 cm 08/19/22 1400   Post-Procedure Surface Area (cm^2) 4.8 cm^2 08/19/22 1400   Post-Procedure Volume (cm^3) 1.44 cm^3 08/19/22 1400   Wound Healing % -3194 08/19/22 1400   Tunneling (cm) 0 cm 08/19/22 1400   Tunneling Clock Position of Wound 12 07/22/22 1400   Undermining (cm) 0 cm 08/19/22 1400   Undermining of Wound, 1st Location From 2 o'clock;To 4 o'clock 06/03/22 1300   Wound Odor None 08/19/22 1400   Exposed Structures None 08/19/22 1400   Number of days: 130       Wound 04/22/22 Pressure Injury Sacrum POA stage 4 (Active)   Wound Image    08/19/22 1400   Site Assessment Red;Yellow;White 08/19/22 1400   Periwound Assessment Scar tissue 08/19/22 1400   Margins Unattached edges 08/19/22 1400   Drainage Amount Moderate 08/19/22 1400   Drainage Description Serosanguineous 08/19/22 1400   Treatments Cleansed;Topical Lidocaine;Provider debridement;Site care 08/19/22 1400    Wound Cleansing Puracyn [place] 08/19/22 1400   Periwound Protectant Skin Protectant Wipes to Periwound;Benzoin;Paste Ring;Drape 08/19/22 1400   Dressing Cleansing/Solutions Not Applicable 08/19/22 1400   Dressing Options Puracyn Gel;Wound Vac;Mepilex 08/19/22 1400   Dressing Changed Changed 07/29/22 1424   Dressing Change/Treatment Frequency Monday, Wednesday, Friday, and As Needed 07/29/22 1424   WOUND NURSE ONLY - Pressure Injury Stage 4 08/12/22 1300   Wound Length (cm) 2 cm 08/19/22 1400   Wound Width (cm) 1.7 cm 08/19/22 1400   Wound Depth (cm) 1.7 cm 08/19/22 1400   Wound Surface Area (cm^2) 3.4 cm^2 08/19/22 1400   Wound Volume (cm^3) 5.78 cm^3 08/19/22 1400   Post-Procedure Length (cm) 2 cm 08/19/22 1400   Post-Procedure Width (cm) 1.8 cm 08/19/22 1400   Post-Procedure Depth (cm) 1.7 cm 08/19/22 1400   Post-Procedure Surface Area (cm^2) 3.6 cm^2 08/19/22 1400   Post-Procedure Volume (cm^3) 6.12 cm^3 08/19/22 1400   Wound Healing % -141 08/19/22 1400   Tunneling (cm) 0 cm 08/19/22 1400   Tunneling Clock Position of Wound 12 05/03/22 1600   Undermining (cm) 2.7 cm 08/19/22 1400   Undermining of Wound, 1st Location From 9 o'clock;To 3 o'clock 08/19/22 1400   Undermining (cm) - 2nd location 3 cm 07/15/22 1400   Undermining of Wound, 2nd Location From 9 o'clock;To 11 o'clock 07/15/22 1400   Undermining (cm) - 3rd location 3.2 cm 06/24/22 1000   Undermining of Wound, 3rd Location From 7 o'clock;To 11 o'clock 06/24/22 1000   Wound Odor None 08/19/22 1400   Exposed Structures Bone 08/19/22 1400   Number of days: 119       Wound 07/22/22 Traumatic Leg Anterior Left (Active)   Wound Image   08/19/22 1400   Site Assessment Pink;Yellow 08/19/22 1400   Periwound Assessment Edema;Fragile 08/19/22 1400   Margins Attached edges 08/19/22 1400   Drainage Amount Scant 08/19/22 1400   Drainage Description Serosanguineous 08/19/22 1400   Treatments Cleansed;Site care 08/19/22 1400   Wound Cleansing Puracyn [place] 08/19/22 1400    Periwound Protectant Barrier Paste 08/19/22 1400   Dressing Cleansing/Solutions Not Applicable 08/19/22 1400   Dressing Options Hydrofiber Silver;Silicone Adhesive Foam;Tubigrip 08/19/22 1400   Dressing Changed Changed 07/29/22 1424   Dressing Change/Treatment Frequency Monday, Wednesday, Friday, and As Needed 07/29/22 1424   Non-staged Wound Description Full thickness 08/12/22 1300   Wound Length (cm) 0.4 cm 08/19/22 1400   Wound Width (cm) 0.4 cm 08/19/22 1400   Wound Depth (cm) 0.1 cm 08/19/22 1400   Wound Surface Area (cm^2) 0.16 cm^2 08/19/22 1400   Wound Volume (cm^3) 0.016 cm^3 08/19/22 1400   Post-Procedure Length (cm) 0.9 cm 08/12/22 1300   Post-Procedure Width (cm) 0.5 cm 08/12/22 1300   Post-Procedure Depth (cm) 0.1 cm 08/12/22 1300   Post-Procedure Surface Area (cm^2) 0.45 cm^2 08/12/22 1300   Post-Procedure Volume (cm^3) 0.045 cm^3 08/12/22 1300   Tunneling (cm) 0 cm 08/19/22 1400   Undermining (cm) 0 cm 08/19/22 1400   Wound Odor None 08/19/22 1400   Exposed Structures None 08/19/22 1400   Number of days: 28       Wound 07/29/22 Pressure Injury Leg Posterior;Lateral Left (Active)   Wound Image   08/19/22 1400   Site Assessment Dry;Brown 08/19/22 1400   Periwound Assessment Dry;Intact;Scar tissue 08/19/22 1400   Drainage Amount None 08/19/22 1400   Drainage Description Sanguineous 08/12/22 1300   Treatments Cleansed;Site care 08/19/22 1400   Wound Cleansing Puracyn [place] 08/19/22 1400   Periwound Protectant Skin Protectant Wipes to Periwound;Barrier Paste 08/19/22 1400   Dressing Cleansing/Solutions Not Applicable 08/19/22 1400   Dressing Options Mepilex;Tubigrip 08/19/22 1400   Dressing Changed Changed 08/19/22 1400   Dressing Change/Treatment Frequency Monday, Wednesday, Friday, and As Needed 07/29/22 1424   WOUND NURSE ONLY - Pressure Injury Stage DTPI 08/12/22 1300   Wound Length (cm) 0.1 cm 08/12/22 1300   Wound Width (cm) 0.1 cm 08/12/22 1300   Wound Depth (cm) 0.1 cm 08/12/22 1300   Wound  Surface Area (cm^2) 0.01 cm^2 08/12/22 1300   Wound Volume (cm^3) 0.001 cm^3 08/12/22 1300   Tunneling (cm) 0 cm 08/19/22 1400   Undermining (cm) 0 cm 08/19/22 1400   Wound Odor None 08/19/22 1400   Exposed Structures None 08/19/22 1400   Number of days: 21         PROCEDURE: Excisional debridement of sacrococcygeal wound  -2% viscous lidocaine applied topically to wound bed for approximately 5 minutes prior to debridement  -Curette used to debride wound bed.  Excisional debridement was performed to remove devitalized tissue until healthy, bleeding tissue was visualized.  Debridement was carried into the undermined areas of the wound.  Total area debrided approximately 12.0 cm² (including into wound undermining).  Deepest level of debridement was into the muscle layer.  -Bleeding controlled with manual pressure.    -Wound care completed by wound RN, refer to flowsheet  -Patient tolerated the procedure well, without c/o pain or discomfort.       PROCEDURE: Excisional debridement of left medial lower leg full-thickness wound, and left anterior lower leg wound  -2% viscous lidocaine applied topically to wound beds for approximately 5 minutes prior to debridement  -Curette used to debride wound beds.  Excisional debridement was performed to remove devitalized tissue until healthy, bleeding tissue was visualized.  Total area debrided 4.8 cm².  Deepest level of debridement was into the subcutaneous layer.  -Bleeding controlled with manual pressure.  -Wound care completed by wound RN, refer to flowsheet  -Patient tolerated the procedure well, without c/o pain or discomfort.     -No debridement of anterior left lower leg abrasion, or DTI of left lateral leg    BIOLOGIC LOG  5/20/2022-first application.  PRODUCT: EpiCord 2 x 3 cm . PRODUCT #[id number]; EXPIRES: 2026-12-01 5/27/2022- second application.  PRODUCT: EpiCordEX 2 x 3 cm . PRODUCT #[id number]; EXPIRES: 2026-09-01    6/3/2022- third application.   PRODUCT: EpiCordEX 2 x 3 cm . PRODUCT #[id number]; EXPIRES: 2026-10-01    6/10/2022- fourth application.  PRODUCT: EpiCordEX 2 x 3 cm . PRODUCT #[id number]; EXPIRES: 2026-09-01 6/17/2022- fifth application.  PRODUCT: EpiCordEX 2 x 3 cm . PRODUCT #[id number]; EXPIRES: 2027-02-01 6/24/2022- sixth application.  PRODUCT: EpiCordEX 2 x 3 cm . PRODUCT #[id number]; EXPIRES: 2027-02-01 07/01/22: Seventh application.  Product: Epicort Dx 2.0 x 3.0 cm reorder number [id number]; product number [id number]; expires 2027-02-01 7/22/2022- eighth application.  PRODUCT: EpiCord 2 x 3 cm, reorder #[id number]. PRODUCT #[id number]; EXPIRES: 2027-02-01      Pertinent Labs and Diagnostics:    Labs:     A1c: No results found for: HBA1C     [facility] results, 7/1/2022 (under media tab)    CRP 13    ESR 31      IMAGING: No results found.    VASCULAR STUDIES: No results found.    LAST  WOUND CULTURE:   Lab Results   Component Value Date/Time    CULTRSULT - (A) 07/21/2022 01:00 PM    CULTRSULT Candida tropicalis  10-50,000 cfu/mL   (A) 07/21/2022 01:00 PM          ASSESSMENT AND PLAN:     1. Sacral decubitus ulcer, stage IV ([facility])  Comments: Ulcer first noted in early April 2022 as small open area which quickly enlarged.  This ulcer is present distal from previous sacral ulcer which healed after surgery in January.  Patient has history of flap reconstruction x2 to this area.    8/19/2022: Wound continues to progress, though very slowly.  Increase granulation, depth of undermining decreased slightly, opening of wound has decreased as well.  Moderate serosanguineous drainage, no wound odor, periwound intact.  -Excisional debridement of wound in clinic today, medically necessary to promote wound healing.  -Patient to return to clinic weekly for assessment and debridement  -Home health to change VAC dressing 2 times per week in between clinic visits  -Plastic surgery is not an option for this  patient.  We have not been able to find a surgeon in [facility] willing to perform surgery on him.  -Healing of this wound complicated by patient's multiple comorbidities, and the large size and chronicity of the wound,    Wound care: Puracyn gel to wound bed, NPWT at 125 mmHg to accelerate granulation, change 3 times per week.      2.  Postoperative wound dehiscence, subsequent encounter  Comments: On 4/26 patient underwent irrigation and debridement of multiple compartments of the left lower extremity states for pressure injury, with bone excision, and complex closure of chronic wound using biologic skin substitute.  During postop visit in surgeons office on 5/11, surgical site was noted to be dehisced.  Patient was referred to wound clinic for management.    8/19/2022: Area and depth of wound has decreased slightly since last assessment.  Periwound remains intact  -Excisional debridement of wound in clinic today, medically necessary to promote wound healing.  -Topical antimicrobial dressing applied.   -Patient to return to clinic weekly for assessment and debridement  -Home health to change dressing 1-2 times per week in between clinic visits    Wound care: Silver Hydrofiber to manage exudate and bioburden, foam cover dressing, Tubigrip to manage edema      3.  Pressure injury of left leg, deep tissue injury  Comments: DTI to posterior left lower leg first observed in clinic 7/29/2022.  Patient does not know how it started, had been wearing heel float boot consistently.    8/19/2022: Tissue remains intact, area has decreased, tissue beginning to lighten.  Small pinpoint opening with scant drainage  -No need for debridement  -We will continue to monitor  -Patient is at high risk for pressure injury development due to immobility, lack of sensation, paraplegia  -He wears heel float boots at all times, these appear to protect this part of his leg as well    Wound care: Mepilex silicone dressing to protect area from  pressure, friction and shear      4.  Wound of left lower extremity, subsequent encounter  Comments: Noted to be open on 8/12/2022, had previously been covered with stable dry scab.    8/19/2022: Wound was noted to be opened last week.  Area has decreased since then, minimal drainage  -No need for debridement, wound is progressing well  -Patient to return to clinic weekly for assessment and debridement  -Home health to change dressing 1-2 times per week in between clinic visits     Wound care: Silver Hydrofiber to manage exudate and bioburden, foam cover dressing, Hypafix tape     5. Quadriplegia, C5-C7, complete ([facility])  Comments: Complicating factor.  Impaired mobility and sensation    -Patient is still spending 7-8 hours/day up in his wheelchair, though he does have appropriate offloading cushion, and knows to reposition frequently.  Wears heel float boots bilaterally at all times      Please note that this note may have been created using voice recognition software. I have worked with technical experts from [employer] to optimize the interface.  I have made every reasonable attempt to correct obvious errors, but there may be errors of grammar and possibly content that I did not discover before finalizing the note.

## 2022-08-19 NOTE — PATIENT INSTRUCTIONS
-Keep your wound dressing clean, dry, and intact.    -Change your dressing if it becomes soiled, soaked, or falls off.    -Wound vac may not have any drainage in tube or cannister & it will still be working.   Change cannister if it does become full by pressing tab on side of machine to remove canister and snap on new one. Full canister can be thrown in the trash. If cannister fills with bright red blood - go to ER. Dressing will be changed every MWF at the wound clini.  If you are having issues with your wound VAC, please consider patching leaks, changing the canister, or calling [phone] for troubleshooting. If the wound VAC has been off or un-operational for over 2 hours, call wound care center to inform them and remove all dressings including black foam and replace with normal saline damp gauze.     -Should you experience any significant changes in your wound(s), such as infection (redness, swelling, localized heat, increased pain, fever > 101 F, chills) or have any questions regarding your home care instructions, please contact the wound center at [phone]. If after hours, contact your primary care physician or go to the hospital emergency room.

## 2022-08-26 ENCOUNTER — OFFICE VISIT (OUTPATIENT)
Dept: WOUND CARE | Facility: MEDICAL CENTER | Age: 72
End: 2022-08-26
Attending: INTERNAL MEDICINE
Payer: MEDICARE

## 2022-08-26 VITALS
DIASTOLIC BLOOD PRESSURE: 75 MMHG | TEMPERATURE: 97.6 F | SYSTOLIC BLOOD PRESSURE: 154 MMHG | HEART RATE: 51 BPM | RESPIRATION RATE: 20 BRPM | OXYGEN SATURATION: 97 %

## 2022-08-26 DIAGNOSIS — G82.53 QUADRIPLEGIA, C5-C7, COMPLETE (HCC): ICD-10-CM

## 2022-08-26 DIAGNOSIS — L89.890 PRESSURE INJURY OF LEFT LEG, UNSTAGEABLE (HCC): ICD-10-CM

## 2022-08-26 DIAGNOSIS — L89.154 SACRAL DECUBITUS ULCER, STAGE IV (HCC): Primary | ICD-10-CM

## 2022-08-26 DIAGNOSIS — S81.802D WOUND OF LEFT LOWER EXTREMITY, SUBSEQUENT ENCOUNTER: ICD-10-CM

## 2022-08-26 DIAGNOSIS — T81.31XD POSTOPERATIVE WOUND DEHISCENCE, SUBSEQUENT ENCOUNTER: ICD-10-CM

## 2022-08-26 DIAGNOSIS — T14.8XXA DEEP TISSUE INJURY: ICD-10-CM

## 2022-08-26 PROCEDURE — 97605 NEG PRS WND THER DME<=50SQCM: CPT

## 2022-08-26 PROCEDURE — 11043 DBRDMT MUSC&/FSCA 1ST 20/<: CPT | Performed by: NURSE PRACTITIONER

## 2022-08-26 PROCEDURE — 11043 DBRDMT MUSC&/FSCA 1ST 20/<: CPT

## 2022-08-26 NOTE — PROCEDURES
VAC dressing removed by CNA. Wound bed inspected and no residual foam noted. Wound VAC changed with 3 black foams. Restarted at 125mmHg continuous. No leak noted.

## 2022-08-26 NOTE — PROGRESS NOTES
Provider Encounter- Pressure Injury        HISTORY OF PRESENT ILLNESS  Wound History:    START OF CARE IN CLINIC: 5/3/2022    REFERRING PROVIDER: Sahara Mendez      WOUNDS- Pressure Injury, Sacrococcygeal, stage IV                                                             Surgical wound dehiscence, left medial lower leg-status post surgical I&D of stage IV pressure injury and           biologic application                       Abrasion to left anterior lower leg-first observed in clinic 7/22/2022          Pressure injury, DTI, left posterior lower leg-first observed in clinic 7/29/2022       HISTORY: Patient with history of incomplete quadriplegia referred to [facility] for treatment of a stage IV pressure injury.  He has a history of previous pressure injuries to this area, and underwent muscle flaps in 2019, and then again in 2020.  He was seen in the wound clinic in November 2021 for an ulcer proximal from his current ulcer, and pressure injuries to his left posterior lower leg and left heel.  At that time, it was discovered that the patient had retained VAC foam embedded in the wound bed of the sacral wound.  Attempts were made to get him back to his plastic surgeon, though unsuccessful.  In January he underwent surgical removal of VAC sponge along with excisional debridement of his sacral wound by Dr. Chaves.  After the surgery, his wound went on to heal without incident.   In early April 2022, his home health nurse noted a new sacral ulcer, below the previous ulcer which quickly tripled in size over the following weeks.  The ulcer to his left medial lower leg had also deteriorated, with bone visible at the base..  He was hospitalized from 4/22 until 4/27/2022 and underwent surgery with Dr. Raman on 4/26 for irrigation and debridement of multiple compartments of the left lower extremity, bone excision, and complex closure of chronic wound using biologic skin substitute.   His sacrococcygeal wound was not  surgically addressed during this admission.  He was discharged back to his group home, with home health, and referral to outpatient wound clinic for his sacral wound.  He was instructed to follow-up with his surgeon for his lower leg wound.       Postoperatively, the left medial lower leg incision dehisced.  He was seen by his surgeon at [facility] on 5/11.  The surgeon opted to leave remaining sutures in place, and refer him to the wound clinic for treatment of this wound.   Treatment of this wound was initiated in clinic on 5/12.  During this visit was also noted that his heel DTI had resolved, but that he had a new pressure injury to his left posterior lower extremity.     A new pressure injury was noted to patient's right upper buttock/lower back on 5/20/2022.  Wound was linear in shape, skin discolored but intact.     Abrasion noted to left anterior lower leg.  First observed in clinic on 7/22/2022.  Patient states he bumped his leg into his food tray.     Small DTI noted to patient's left lateral lower leg on 7/29/2022.  Skin intact but discolored.     Large area of deep tissue injury noted to patient's left exterior lower leg.  Patient denied any trauma to this area.  Skin intact.  Wound documented.    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  Never used smokeless tobacco    Patient's problem list, allergies, and current medications reviewed and updated in Epic    Interval History:  Interval History thinned 7/29/2022.  Please see previous notes for complete interval history.     7/29/2022 : Clinic visit with ADILSON Arthur, FNP-BC, CWDINESHN, CFCN.  Turk states he continues to do  well.  He was able to go to [place] yesterday, spent today [place].  His left lower extremity wound has deteriorated, presents today with significant odor and deterioration of tissue.  VAC held from this wound today after debridement.  I also did not apply biologic today.   Sacral wound about the same in area, though some evidence of increasing granulation.  No evidence of infection.  Small abrasion to his left lower leg appears to be improving.  He has a new small DTI to his left lateral lower leg, skin intact but discolored.    8/5/2022 : Clinic visit with ADILSON Arthur, HOLDEN, IMAN, LILIYA.  Anjum continues to feel well.  His left lower wound has improved with VAC hold, will discontinue from this wound altogether.  I did not apply biologic to this wound today given its current improvement.  DTI to posterior left leg is a bit darker today, skin still intact.  Patient states he has been wearing his heel float boot at all times.  Sacral wound with increased granulation, slight decrease in depth, bone still exposed.    8/12/2022 : Clinic visit with ADILSON Arthur, HOLDEN, IMAN, LILIYA.   Anjum states he is feeling well.  His left lower leg wound continues to improve without the use of VAC or biologic.  The deep tissue injury to his posterior leg is gradually improving, area decreasing.  The abrasion to his left shin is now open, was covered with scab last week.  Sacral wound improving slowly, increase granulation, slight decrease in area.  Home health had messaged that they were concerned for infection due to odor.  I did not appreciate any significant odor today.  We did add Puracyn gel to the wound bed prior to reapplying VAC.  Anjum continues to spend most of his day up in his wheelchair, though tries to reposition frequently, and is wearing heel float boots bilaterally at all times.    8/19/2022 : Clinic visit with ADILSON Arthur, HOLDEN, IMAN, LILIYA.   Anjum states he is in his usual state of health,  feeling well overall.  All of his wounds are progressing, though very slowly.  He continues to spend most of the day up in his wheelchair.  He does know to shift his weight frequently, and has an appropriate pressure relief cushion in his chair.    He was seen by [facility] earlier this week, for complaints of leakage around his suprapubic catheter and fever.  He was prescribed Keflex, and his catheter was changed    8/26/2022: Clinic visit with ADILSON Flores.  Patient reports overall he is feeling well denies any fever or chills.  Patient reports that he is continuously wearing Prevalon boots to offload bilateral lower extremities.  The left medial lower extremity wound is quite boggy with a small amount of turbulent fluid which was expressed with minimal palpation to the periwound area.  There is no foul-smelling odor emanating from the wound bed there is no erythema or edema.    REVIEW OF SYSTEMS:   Unchanged from previous clinic visit on 8/12/2022    PHYSICAL EXAMINATION:   BP (!) 154/75 (BP Location: Left arm, Patient Position: Sitting) Comment: RN notified  Pulse (!) 51   Temp 36.4 °C (97.6 °F) (Temporal)   Resp 20   SpO2 97%   Physical Exam  Constitutional:       Appearance: He is obese.   Cardiovascular:      Rate and Rhythm: Bradycardia present.   Pulmonary:      Effort: Pulmonary effort is normal. No respiratory distress.      Breath sounds: No wheezing.   Skin:     Comments: Stage IV coccygeal pressure injury-stable  Full-thickness wound to left medial lower leg-tissue quality is very poor with significant fluctuance and bogginess.  Small amount of turbulent fluid expressed with light palpation to the area.  Wound bed and drainage is without odor.  There is no periwound erythema or edema suggestive of infection   Neurological:      Mental Status: He is alert and oriented to person, place, and time.       WOUND ASSESSMENT  Wound 04/11/22 Full Thickness Wound Leg Medial Left --Left Medial  Lower Leg (Active)   Wound Image   08/26/22 1400   Site Assessment Red;Purple;Boggy 08/26/22 1400   Periwound Assessment Edema;Fragile 08/26/22 1400   Margins Attached edges 08/26/22 1400   Drainage Amount Moderate 08/26/22 1400   Drainage Description Serosanguineous;Sanguineous 08/26/22 1400   Treatments Cleansed;Site care 08/26/22 1400   Wound Cleansing Puracyn [place] 08/26/22 1400   Periwound Protectant Skin Protectant Wipes to Periwound;Barrier Paste 08/26/22 1400   Dressing Cleansing/Solutions Not Applicable 08/26/22 1400   Dressing Options Hydrofiber Silver;Mepilex;Tubigrip 08/26/22 1400   Dressing Changed Changed 08/19/22 1400   Dressing Status Clean;Dry;Intact 04/11/22 1330   Dressing Change/Treatment Frequency Monday, Wednesday, Friday, and As Needed 07/29/22 1424   Non-staged Wound Description Full thickness 08/26/22 1400   Wound Length (cm) 2.5 cm 08/26/22 1400   Wound Width (cm) 0.9 cm 08/26/22 1400   Wound Depth (cm) 0.8 cm 08/26/22 1400   Wound Surface Area (cm^2) 2.25 cm^2 08/26/22 1400   Wound Volume (cm^3) 1.8 cm^3 08/26/22 1400   Post-Procedure Length (cm) 3 cm 08/19/22 1400   Post-Procedure Width (cm) 1.6 cm 08/19/22 1400   Post-Procedure Depth (cm) 0.3 cm 08/19/22 1400   Post-Procedure Surface Area (cm^2) 4.8 cm^2 08/19/22 1400   Post-Procedure Volume (cm^3) 1.44 cm^3 08/19/22 1400   Wound Healing % -3650 08/26/22 1400   Tunneling (cm) 0 cm 08/26/22 1400   Tunneling Clock Position of Wound 12 07/22/22 1400   Undermining (cm) 0 cm 08/26/22 1400   Undermining of Wound, 1st Location From 2 o'clock;To 4 o'clock 06/03/22 1300   Wound Odor None 08/26/22 1400   Exposed Structures None 08/26/22 1400   Number of days: 137       Wound 04/22/22 Pressure Injury Sacrum POA stage 4 (Active)   Wound Image    08/26/22 1400   Site Assessment Red;Yellow;White;Other (Comment) 08/26/22 1400   Periwound Assessment Scar tissue 08/26/22 1400   Margins Unattached edges 08/26/22 1400   Drainage Amount Small 08/26/22 1400    Drainage Description Serosanguineous 08/26/22 1400   Treatments Cleansed;Provider debridement;Site care 08/26/22 1400   Wound Cleansing Puracyn [place] 08/26/22 1400   Periwound Protectant Benzoin;Paste Ring;Skin Protectant Wipes to Periwound;Drape 08/26/22 1400   Dressing Cleansing/Solutions Not Applicable 08/26/22 1400   Dressing Options Puracyn Gel;Wound Vac;Mepilex 08/26/22 1400   Dressing Changed Changed 07/29/22 1424   Dressing Change/Treatment Frequency Monday, Wednesday, Friday, and As Needed 07/29/22 1424   WOUND NURSE ONLY - Pressure Injury Stage 4 08/26/22 1400   Wound Length (cm) 2 cm 08/26/22 1400   Wound Width (cm) 1.5 cm 08/26/22 1400   Wound Depth (cm) 2 cm 08/26/22 1400   Wound Surface Area (cm^2) 3 cm^2 08/26/22 1400   Wound Volume (cm^3) 6 cm^3 08/26/22 1400   Post-Procedure Length (cm) 2.1 cm 08/26/22 1400   Post-Procedure Width (cm) 1.6 cm 08/26/22 1400   Post-Procedure Depth (cm) 2.1 cm 08/26/22 1400   Post-Procedure Surface Area (cm^2) 3.36 cm^2 08/26/22 1400   Post-Procedure Volume (cm^3) 7.056 cm^3 08/26/22 1400   Wound Healing % -150 08/26/22 1400   Tunneling (cm) 0 cm 08/26/22 1400   Tunneling Clock Position of Wound 12 05/03/22 1600   Undermining (cm) 3 cm 08/26/22 1400   Undermining of Wound, 1st Location From 9 o'clock;To 3 o'clock 08/26/22 1400   Undermining (cm) - 2nd location 3 cm 07/15/22 1400   Undermining of Wound, 2nd Location From 9 o'clock;To 11 o'clock 07/15/22 1400   Undermining (cm) - 3rd location 3.2 cm 06/24/22 1000   Undermining of Wound, 3rd Location From 7 o'clock;To 11 o'clock 06/24/22 1000   Wound Odor None 08/26/22 1400   Exposed Structures Bone 08/26/22 1400   Number of days: 126       Wound 07/22/22 Traumatic Leg Anterior Left (Active)   Wound Image   08/26/22 1400   Site Assessment Purple;Other (Comment) 08/26/22 1400   Periwound Assessment Edema;Fragile 08/26/22 1400   Margins Attached edges 08/19/22 1400   Drainage Amount None 08/26/22 1400   Drainage  Description Serosanguineous 08/19/22 1400   Treatments Cleansed;Site care 08/26/22 1400   Wound Cleansing Foam Cleanser/Washcloth 08/26/22 1400   Periwound Protectant Skin Protectant Wipes to Periwound 08/26/22 1400   Dressing Cleansing/Solutions Not Applicable 08/26/22 1400   Dressing Options Silicone Adhesive Foam;Tubigrip 08/26/22 1400   Dressing Changed Changed 07/29/22 1424   Dressing Change/Treatment Frequency Monday, Wednesday, Friday, and As Needed 07/29/22 1424   Non-staged Wound Description Full thickness 08/12/22 1300   Wound Length (cm) 0.4 cm 08/19/22 1400   Wound Width (cm) 0.4 cm 08/19/22 1400   Wound Depth (cm) 0.1 cm 08/19/22 1400   Wound Surface Area (cm^2) 0.16 cm^2 08/19/22 1400   Wound Volume (cm^3) 0.016 cm^3 08/19/22 1400   Post-Procedure Length (cm) 0.9 cm 08/12/22 1300   Post-Procedure Width (cm) 0.5 cm 08/12/22 1300   Post-Procedure Depth (cm) 0.1 cm 08/12/22 1300   Post-Procedure Surface Area (cm^2) 0.45 cm^2 08/12/22 1300   Post-Procedure Volume (cm^3) 0.045 cm^3 08/12/22 1300   Tunneling (cm) 0 cm 08/26/22 1400   Undermining (cm) 0 cm 08/26/22 1400   Wound Odor None 08/26/22 1400   Exposed Structures None 08/26/22 1400   Number of days: 35       Wound 07/29/22 Pressure Injury Leg Posterior;Lateral Left (Active)   Wound Image   08/26/22 1400   Site Assessment Purple 08/26/22 1400   Periwound Assessment Blanchable erythema 08/26/22 1400   Drainage Amount None 08/26/22 1400   Drainage Description Sanguineous 08/12/22 1300   Treatments Cleansed;Site care 08/26/22 1400   Wound Cleansing Foam Cleanser/Washcloth 08/26/22 1400   Periwound Protectant Skin Protectant Wipes to Periwound 08/26/22 1400   Dressing Cleansing/Solutions Not Applicable 08/26/22 1400   Dressing Options Mepilex;Tubigrip 08/26/22 1400   Dressing Changed Changed 08/19/22 1400   Dressing Change/Treatment Frequency Monday, Wednesday, Friday, and As Needed 07/29/22 1424   WOUND NURSE ONLY - Pressure Injury Stage DTPI 08/26/22  1400   Wound Length (cm) 0.1 cm 08/12/22 1300   Wound Width (cm) 0.1 cm 08/12/22 1300   Wound Depth (cm) 0.1 cm 08/12/22 1300   Wound Surface Area (cm^2) 0.01 cm^2 08/12/22 1300   Wound Volume (cm^3) 0.001 cm^3 08/12/22 1300   Tunneling (cm) 0 cm 08/26/22 1400   Undermining (cm) 0 cm 08/26/22 1400   Wound Odor None 08/26/22 1400   Exposed Structures None 08/26/22 1400   Number of days: 28         PROCEDURE: Excisional debridement of sacrococcygeal wound  -2% viscous lidocaine applied topically to wound bed for approximately 5 minutes prior to debridement  -Curette used to debride wound bed.  Excisional debridement was performed to remove devitalized tissue until healthy, bleeding tissue was visualized.  Debridement was carried into the undermined areas of the wound.  Total area debrided approximately 3.36 cm² (including into wound undermining).  Deepest level of debridement was into the muscle layer.  -Bleeding controlled with manual pressure.    -Wound care completed by wound RN, refer to flowsheet  -Patient tolerated the procedure well, without c/o pain or discomfort.         -No debridement of anterior left lower leg abrasion, or DTI of left lateral leg    BIOLOGIC LOG  5/20/2022-first application.  PRODUCT: EpiCord 2 x 3 cm . PRODUCT #[id number]; EXPIRES: 2026-12-01 5/27/2022- second application.  PRODUCT: EpiCordEX 2 x 3 cm . PRODUCT #[id number]; EXPIRES: 2026-09-01    6/3/2022- third application.  PRODUCT: EpiCordEX 2 x 3 cm . PRODUCT #[id number]; EXPIRES: 2026-10-01    6/10/2022- fourth application.  PRODUCT: EpiCordEX 2 x 3 cm . PRODUCT #[id number]; EXPIRES: 2026-09-01 6/17/2022- fifth application.  PRODUCT: EpiCordEX 2 x 3 cm . PRODUCT #[id number]; EXPIRES: 2027-02-01 6/24/2022- sixth application.  PRODUCT: EpiCordEX 2 x 3 cm . PRODUCT #[id number]; EXPIRES: 2027-02-01    07/01/22: Seventh application.  Product: Epicort Dx 2.0 x 3.0 cm reorder number  [id number]; product number [id number]; expires 2027-02-01 7/22/2022- eighth application.  PRODUCT: EpiCord 2 x 3 cm, reorder #[id number]. PRODUCT #[id number]; EXPIRES: 2027-02-01      Pertinent Labs and Diagnostics:    Labs:     A1c: No results found for: HBA1C     [facility] results, 7/1/2022 (under media tab)    CRP 13    ESR 31      IMAGING: No results found.    VASCULAR STUDIES: No results found.    LAST  WOUND CULTURE:   Lab Results   Component Value Date/Time    CULTRSULT - (A) 07/21/2022 01:00 PM    CULTRSULT Candida tropicalis  10-50,000 cfu/mL   (A) 07/21/2022 01:00 PM          ASSESSMENT AND PLAN:     1. Sacral decubitus ulcer, stage IV (HCC)  Comments: Ulcer first noted in early April 2022 as small open area which quickly enlarged.  This ulcer is present distal from previous sacral ulcer which healed after surgery in January.  Patient has history of flap reconstruction x2 to this area.    8/26/2022: The wound is stable no significant improvement.  -Visional debridement performed in clinic today to promote granulation tissue formation  -Patient is to return to clinic weekly for assessment and debridement if needed  -Health to continue to change the wound VAC dressing 2 times per week in between clinic visits.  -Plastic surgery is not an option for this patient.  We have not been able to find a surgeon in [facility] willing to perform surgery on him.  -Healing of this wound is further complicated by the patient's multiple comorbidities, exposed bone, and significant pressure from sitting in his wheelchair    Wound care: Puracyn gel to wound bed, NPWT at 125 mmHg to accelerate granulation, change 3 times per week.      2.  Postoperative wound dehiscence, subsequent encounter  Comments: On 4/26 patient underwent irrigation and debridement of multiple compartments of the left lower extremity states for pressure injury, with bone excision, and complex closure of chronic wound using biologic skin substitute.   During postop visit in surgeons office on 5/11, surgical site was noted to be dehisced.  Patient was referred to wound clinic for management.    8/26/2022: Tissue quality is very poor with fluctuance  -No excisional debridement performed in clinic today.  -Patient to return to clinic weekly for assessment and debridement if necessary  -Home health to change dressing 1-2 times per week in between clinic visits    Wound care: Silver Hydrofiber to manage exudate and bioburden, Mepilex, Tubigrip D to manage edema      3.  Pressure injury of left leg, deep tissue injury  Comments: DTI to posterior left lower leg first observed in clinic 7/29/2022.  Patient does not know how it started, had been wearing heel float boot consistently.    8/26/2022: Tissue is still dark but lightening.  There is no open wounds to this area.  -Tissue is intact at this time  -Continue to monitor for any deterioration in tissue quality  -Patient is currently wearing Prevalon boots to help prevent pressure injuries to bilateral lower extremities    Wound care: Mepilex, Tubigrip D      4.  Wound of left lower extremity, subsequent encounter  Comments: Left anterior lower extremity wound.  Previous open wound has fully sealed.  There is still a small amount of dark tissue underneath the service    8/26/2022: There is no open wounds to this area this time  -No excisional debridement required in clinic today  -At this point protective dressing applied  -Continue to monitor.      5. Quadriplegia, C5-C7, complete (HCC)  Comments: Complicating factor.  Impaired mobility and sensation    -Patient is still spending 7-8 hours/day up in his wheelchair, though he does have appropriate offloading cushion, and knows to reposition frequently.  Wears heel float boots bilaterally at all times      Please note that this note may have been created using voice recognition software. I have worked with technical experts from [employer] to optimize the interface.   I have made every reasonable attempt to correct obvious errors, but there may be errors of grammar and possibly content that I did not discover before finalizing the note.

## 2022-08-26 NOTE — PATIENT INSTRUCTIONS
Should you experience any significant changes in your wound(s) such as infection (redness, swelling, localized heat, increased pain, fever >101 F, chills) or have any questions regarding your home care instructions, please contact the wound center [phone]. If after hours, contact your primary care physician or go the hospital emergency room.  Keep dressing clean and dry and cover while bathing. Only change dressing if over saturated, soiled or its falling off.

## 2022-09-01 ENCOUNTER — HOSPITAL ENCOUNTER (OUTPATIENT)
Facility: MEDICAL CENTER | Age: 72
End: 2022-09-01
Attending: PHYSICIAN ASSISTANT
Payer: MEDICARE

## 2022-09-01 PROCEDURE — 87086 URINE CULTURE/COLONY COUNT: CPT

## 2022-09-01 PROCEDURE — 87186 SC STD MICRODIL/AGAR DIL: CPT

## 2022-09-01 PROCEDURE — 87077 CULTURE AEROBIC IDENTIFY: CPT | Mod: 91

## 2022-09-02 ENCOUNTER — OFFICE VISIT (OUTPATIENT)
Dept: WOUND CARE | Facility: MEDICAL CENTER | Age: 72
End: 2022-09-02
Attending: INTERNAL MEDICINE
Payer: MEDICARE

## 2022-09-02 VITALS
DIASTOLIC BLOOD PRESSURE: 66 MMHG | HEART RATE: 72 BPM | OXYGEN SATURATION: 92 % | RESPIRATION RATE: 18 BRPM | SYSTOLIC BLOOD PRESSURE: 110 MMHG | TEMPERATURE: 98.4 F

## 2022-09-02 DIAGNOSIS — T81.31XD POSTOPERATIVE WOUND DEHISCENCE, SUBSEQUENT ENCOUNTER: ICD-10-CM

## 2022-09-02 DIAGNOSIS — G82.53 QUADRIPLEGIA, C5-C7, COMPLETE (HCC): ICD-10-CM

## 2022-09-02 DIAGNOSIS — L89.890 PRESSURE INJURY OF LEFT LEG, UNSTAGEABLE (HCC): ICD-10-CM

## 2022-09-02 DIAGNOSIS — L89.154 SACRAL DECUBITUS ULCER, STAGE IV (HCC): ICD-10-CM

## 2022-09-02 PROCEDURE — 11043 DBRDMT MUSC&/FSCA 1ST 20/<: CPT | Performed by: NURSE PRACTITIONER

## 2022-09-02 PROCEDURE — 97605 NEG PRS WND THER DME<=50SQCM: CPT

## 2022-09-02 PROCEDURE — 11043 DBRDMT MUSC&/FSCA 1ST 20/<: CPT

## 2022-09-02 NOTE — PATIENT INSTRUCTIONS
Should you experience any significant changes in your wound(s) such as infection (redness, swelling, localized heat, increased pain, fever >101 F, chills) or have any questions regarding your home care instructions, please contact the wound center [phone]. If after hours, contact your primary care physician or go the hospital emergency room.  Keep dressing clean and dry and cover while bathing. Only change dressing if over saturated, soiled or its falling off.

## 2022-09-02 NOTE — PROGRESS NOTES
Provider Encounter- Pressure Injury        HISTORY OF PRESENT ILLNESS  Wound History:    START OF CARE IN CLINIC: 5/3/2022    REFERRING PROVIDER: Sahara Mendez      WOUNDS- Pressure Injury, Sacrococcygeal, stage IV                                                             Surgical wound dehiscence, left medial lower leg-status post surgical I&D of stage IV pressure injury and           biologic application                       Abrasion to left anterior lower leg-first observed in clinic 7/22/2022          Pressure injury, DTI, left posterior lower leg-first observed in clinic 7/29/2022       HISTORY: Patient with history of incomplete quadriplegia referred to [facility] for treatment of a stage IV pressure injury.  He has a history of previous pressure injuries to this area, and underwent muscle flaps in 2019, and then again in 2020.  He was seen in the wound clinic in November 2021 for an ulcer proximal from his current ulcer, and pressure injuries to his left posterior lower leg and left heel.  At that time, it was discovered that the patient had retained VAC foam embedded in the wound bed of the sacral wound.  Attempts were made to get him back to his plastic surgeon, though unsuccessful.  In January he underwent surgical removal of VAC sponge along with excisional debridement of his sacral wound by Dr. Chaves.  After the surgery, his wound went on to heal without incident.   In early April 2022, his home health nurse noted a new sacral ulcer, below the previous ulcer which quickly tripled in size over the following weeks.  The ulcer to his left medial lower leg had also deteriorated, with bone visible at the base..  He was hospitalized from 4/22 until 4/27/2022 and underwent surgery with Dr. Raman on 4/26 for irrigation and debridement of multiple compartments of the left lower extremity, bone excision, and complex closure of chronic wound using biologic skin substitute.   His sacrococcygeal wound was not  surgically addressed during this admission.  He was discharged back to his group home, with home health, and referral to outpatient wound clinic for his sacral wound.  He was instructed to follow-up with his surgeon for his lower leg wound.       Postoperatively, the left medial lower leg incision dehisced.  He was seen by his surgeon at [facility] on 5/11.  The surgeon opted to leave remaining sutures in place, and refer him to the wound clinic for treatment of this wound.   Treatment of this wound was initiated in clinic on 5/12.  During this visit was also noted that his heel DTI had resolved, but that he had a new pressure injury to his left posterior lower extremity.     A new pressure injury was noted to patient's right upper buttock/lower back on 5/20/2022.  Wound was linear in shape, skin discolored but intact.     Abrasion noted to left anterior lower leg.  First observed in clinic on 7/22/2022.  Patient states he bumped his leg into his food tray.     Small DTI noted to patient's left lateral lower leg on 7/29/2022.  Skin intact but discolored.     Large area of deep tissue injury noted to patient's left exterior lower leg.  Patient denied any trauma to this area.  Skin intact.  Wound documented.    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  Never used smokeless tobacco    Patient's problem list, allergies, and current medications reviewed and updated in Epic    Interval History:  Interval History thinned 7/29/2022.  Please see previous notes for complete interval history.     7/29/2022 : Clinic visit with ADILSON Arthur, FNP-BC, CWDINESHN, CFCN.  Turk states he continues to do  well.  He was able to go to [employer] yesterday, spent today [place].  His left lower extremity wound has deteriorated, presents today with significant odor and deterioration of tissue.  VAC held from this wound today after debridement.  I also did not apply biologic today.   Sacral wound about the same in area, though some evidence of increasing granulation.  No evidence of infection.  Small abrasion to his left lower leg appears to be improving.  He has a new small DTI to his left lateral lower leg, skin intact but discolored.    8/5/2022 : Clinic visit with ADILSON Arthur, HOLDEN, IMAN, LILIYA.  Anjum continues to feel well.  His left lower wound has improved with VAC hold, will discontinue from this wound altogether.  I did not apply biologic to this wound today given its current improvement.  DTI to posterior left leg is a bit darker today, skin still intact.  Patient states he has been wearing his heel float boot at all times.  Sacral wound with increased granulation, slight decrease in depth, bone still exposed.    8/12/2022 : Clinic visit with ADILSON Arthur, HOLDEN, IMAN, LILIYA.   Anjum states he is feeling well.  His left lower leg wound continues to improve without the use of VAC or biologic.  The deep tissue injury to his posterior leg is gradually improving, area decreasing.  The abrasion to his left shin is now open, was covered with scab last week.  Sacral wound improving slowly, increase granulation, slight decrease in area.  Home health had messaged that they were concerned for infection due to odor.  I did not appreciate any significant odor today.  We did add Puracyn gel to the wound bed prior to reapplying VAC.  Anjum continues to spend most of his day up in his wheelchair, though tries to reposition frequently, and is wearing heel float boots bilaterally at all times.    8/19/2022 : Clinic visit with ADILSON Arthur, HOLDEN, IMAN, LILIYA.   Anjum states he is in his usual state of health,  feeling well overall.  All of his wounds are progressing, though very slowly.  He continues to spend most of the day up in his wheelchair.  He does know to shift his weight frequently, and has an appropriate pressure relief cushion in his chair.    He was seen by [facility] earlier this week, for complaints of leakage around his suprapubic catheter and fever.  He was prescribed Keflex, and his catheter was changed    8/26/2022: Clinic visit with ADILSON Flores.  Patient reports overall he is feeling well denies any fever or chills.  Patient reports that he is continuously wearing Prevalon boots to offload bilateral lower extremities.  The left medial lower extremity wound is quite boggy with a small amount of turbulent fluid which was expressed with minimal palpation to the periwound area.  There is no foul-smelling odor emanating from the wound bed there is no erythema or edema.    9/2/2022 : Clinic visit with ADILSON Arthur, FNPEGGY-BC, CWDINESHN, CFTRACI.   States he is feeling well overall, denies fevers, chills, nausea, vomiting, cough or shortness of breath.  Unfortunately, his wounds are not progressing significantly.  Leg wound presents with boggy tissue, and probes to bone.  Area and depth of sacral wound have not changed significantly, bone still exposed.  Previously has been seen by several different plastic surgeons, including Dr. Rodriguez, Dr. Nicolas,  Dr. Mott, Dr. Chaves, he was also referred to Dr. Browne at [facility].  None of these surgeons have agreed to see him thus far.       REVIEW OF SYSTEMS:   Unchanged from previous clinic visit on 8/26/2022    PHYSICAL EXAMINATION:   /66 (BP Location: Left arm, Patient Position: Sitting)   Pulse 72   Temp 36.9 °C (98.4 °F) (Temporal)   Resp 18   SpO2 92%   Physical Exam  Constitutional:       Appearance: He is obese.   Cardiovascular:      Rate and Rhythm: Bradycardia present.   Pulmonary:      Effort: Pulmonary effort is normal. No respiratory  distress.      Breath sounds: No wheezing.   Skin:     Comments: Stage IV coccygeal pressure injury-increase granulation tissue within wound bed, however bone still exposed, and tracts persist.  Moderate serosanguineous drainage, no wound odor  Full-thickness wound to left medial lower leg-tissue quality boggy, no evidence of granulation, probes to bone, area has decreased over the past few weeks.  Moderate amount of serosanguineous drainage, no wound odor   Neurological:      Mental Status: He is alert and oriented to person, place, and time.       WOUND ASSESSMENT  Wound 04/11/22 Full Thickness Wound Leg Medial Left --Left Medial Lower Leg (Active)   Wound Image    09/02/22 1430   Site Assessment Red;Purple;Boggy 09/02/22 1430   Periwound Assessment Edema;Fragile 09/02/22 1430   Margins Attached edges 09/02/22 1430   Drainage Amount Moderate 09/02/22 1430   Drainage Description Serosanguineous;Sanguineous 09/02/22 1430   Treatments Cleansed;Site care 09/02/22 1430   Wound Cleansing Normal Saline Irrigation 09/02/22 1430   Periwound Protectant Skin Protectant Wipes to Periwound;Barrier Paste 09/02/22 1430   Dressing Cleansing/Solutions Not Applicable 09/02/22 1430   Dressing Options Hydrofiber Silver;Mepilex;Tubigrip 09/02/22 1430   Dressing Changed Changed 08/19/22 1400   Dressing Status Clean;Dry;Intact 04/11/22 1330   Dressing Change/Treatment Frequency Monday, Wednesday, Friday, and As Needed 07/29/22 1424   Non-staged Wound Description Full thickness 09/02/22 1430   Wound Length (cm) 3 cm 09/02/22 1430   Wound Width (cm) 1.1 cm 09/02/22 1430   Wound Depth (cm) 1 cm 09/02/22 1430   Wound Surface Area (cm^2) 3.3 cm^2 09/02/22 1430   Wound Volume (cm^3) 3.3 cm^3 09/02/22 1430   Post-Procedure Length (cm) 3 cm 09/02/22 1430   Post-Procedure Width (cm) 1.2 cm 09/02/22 1430   Post-Procedure Depth (cm) 1 cm 09/02/22 1430   Post-Procedure Surface Area (cm^2) 3.6 cm^2 09/02/22 1430   Post-Procedure Volume (cm^3) 3.6  cm^3 09/02/22 1430   Wound Healing % -6775 09/02/22 1430   Tunneling (cm) 0 cm 09/02/22 1430   Tunneling Clock Position of Wound 12 07/22/22 1400   Undermining (cm) 0 cm 09/02/22 1430   Undermining of Wound, 1st Location From 2 o'clock;To 4 o'clock 06/03/22 1300   Wound Odor None 09/02/22 1430   Exposed Structures Bone 09/02/22 1430   Number of days: 144       Wound 04/22/22 Pressure Injury Sacrum POA stage 4 (Active)   Wound Image    09/02/22 1430   Site Assessment Red;Other (Comment) 09/02/22 1430   Periwound Assessment Scar tissue 09/02/22 1430   Margins Unattached edges 09/02/22 1430   Drainage Amount Small 09/02/22 1430   Drainage Description Serosanguineous 09/02/22 1430   Treatments Compression;Provider debridement;Site care 09/02/22 1430   Wound Cleansing Normal Saline Irrigation 09/02/22 1430   Periwound Protectant Benzoin;Skin Protectant Wipes to Periwound;Paste Ring;Drape 09/02/22 1430   Dressing Cleansing/Solutions Not Applicable 09/02/22 1430   Dressing Options Wound Vac;Mepilex 09/02/22 1430   Dressing Changed Changed 07/29/22 1424   Dressing Change/Treatment Frequency Monday, Wednesday, Friday, and As Needed 07/29/22 1424   WOUND NURSE ONLY - Pressure Injury Stage 4 09/02/22 1430   Wound Length (cm) 2 cm 09/02/22 1430   Wound Width (cm) 2.5 cm 09/02/22 1430   Wound Depth (cm) 1.9 cm 09/02/22 1430   Wound Surface Area (cm^2) 5 cm^2 09/02/22 1430   Wound Volume (cm^3) 9.5 cm^3 09/02/22 1430   Post-Procedure Length (cm) 2 cm 09/02/22 1430   Post-Procedure Width (cm) 2.6 cm 09/02/22 1430   Post-Procedure Depth (cm) 2 cm 09/02/22 1430   Post-Procedure Surface Area (cm^2) 5.2 cm^2 09/02/22 1430   Post-Procedure Volume (cm^3) 10.4 cm^3 09/02/22 1430   Wound Healing % -296 09/02/22 1430   Tunneling (cm) 0 cm 09/02/22 1430   Tunneling Clock Position of Wound 12 05/03/22 1600   Undermining (cm) 3.3 cm 09/02/22 1430   Undermining of Wound, 1st Location From 9 o'clock;To 3 o'clock 09/02/22 1430   Undermining (cm)  - 2nd location 3 cm 07/15/22 1400   Undermining of Wound, 2nd Location From 9 o'clock;To 11 o'clock 07/15/22 1400   Undermining (cm) - 3rd location 3.2 cm 06/24/22 1000   Undermining of Wound, 3rd Location From 7 o'clock;To 11 o'clock 06/24/22 1000   Wound Odor None 09/02/22 1430   Exposed Structures Bone 09/02/22 1430   Number of days: 133       Wound 07/22/22 Traumatic Leg Anterior Left (Active)   Wound Image   09/02/22 1430   Site Assessment Purple;Red 09/02/22 1430   Periwound Assessment Edema;Fragile 09/02/22 1430   Margins Attached edges 08/19/22 1400   Drainage Amount None 09/02/22 1430   Drainage Description Serosanguineous 08/19/22 1400   Treatments Cleansed 09/02/22 1430   Wound Cleansing Foam Cleanser/Washcloth 09/02/22 1430   Periwound Protectant Skin Protectant Wipes to Periwound 09/02/22 1430   Dressing Cleansing/Solutions Not Applicable 09/02/22 1430   Dressing Options Silicone Adhesive Foam;Tubigrip 09/02/22 1430   Dressing Changed Changed 07/29/22 1424   Dressing Change/Treatment Frequency Monday, Wednesday, Friday, and As Needed 07/29/22 1424   Non-staged Wound Description Full thickness 08/12/22 1300   Wound Length (cm) 0.4 cm 08/19/22 1400   Wound Width (cm) 0.4 cm 08/19/22 1400   Wound Depth (cm) 0.1 cm 08/19/22 1400   Wound Surface Area (cm^2) 0.16 cm^2 08/19/22 1400   Wound Volume (cm^3) 0.016 cm^3 08/19/22 1400   Post-Procedure Length (cm) 0.9 cm 08/12/22 1300   Post-Procedure Width (cm) 0.5 cm 08/12/22 1300   Post-Procedure Depth (cm) 0.1 cm 08/12/22 1300   Post-Procedure Surface Area (cm^2) 0.45 cm^2 08/12/22 1300   Post-Procedure Volume (cm^3) 0.045 cm^3 08/12/22 1300   Tunneling (cm) 0 cm 09/02/22 1430   Undermining (cm) 0 cm 09/02/22 1430   Wound Odor None 09/02/22 1430   Exposed Structures None 09/02/22 1430   Number of days: 42       Wound 07/29/22 Pressure Injury Leg Posterior;Lateral Left (Active)   Wound Image   09/02/22 1430   Site Assessment Purple 09/02/22 1430   Periwound  Assessment Blanchable erythema 09/02/22 1430   Drainage Amount None 09/02/22 1430   Drainage Description Sanguineous 08/12/22 1300   Treatments Cleansed 09/02/22 1430   Wound Cleansing Foam Cleanser/Washcloth 09/02/22 1430   Periwound Protectant Skin Protectant Wipes to Periwound 09/02/22 1430   Dressing Cleansing/Solutions Not Applicable 09/02/22 1430   Dressing Options Mepilex;Tubigrip 09/02/22 1430   Dressing Changed Changed 08/19/22 1400   Dressing Change/Treatment Frequency Monday, Wednesday, Friday, and As Needed 07/29/22 1424   WOUND NURSE ONLY - Pressure Injury Stage DTPI 09/02/22 1430   Wound Length (cm) 0.1 cm 08/12/22 1300   Wound Width (cm) 0.1 cm 08/12/22 1300   Wound Depth (cm) 0.1 cm 08/12/22 1300   Wound Surface Area (cm^2) 0.01 cm^2 08/12/22 1300   Wound Volume (cm^3) 0.001 cm^3 08/12/22 1300   Tunneling (cm) 0 cm 09/02/22 1430   Undermining (cm) 0 cm 09/02/22 1430   Wound Odor None 09/02/22 1430   Exposed Structures None 09/02/22 1430   Number of days: 35         PROCEDURE: Excisional debridement of sacrococcygeal wound  -2% viscous lidocaine applied topically to wound bed for approximately 5 minutes prior to debridement  -Curette used to debride wound bed.  Excisional debridement was performed to remove devitalized tissue until healthy, bleeding tissue was visualized.  Debridement was carried into the undermined areas of the wound.  Total area debrided approximately 10.0 cm² (including into wound undermining).  Deepest level of debridement was into the muscle layer.  -Bleeding controlled with manual pressure.    -Wound care completed by wound RN, refer to flowsheet  -Patient tolerated the procedure well, without c/o pain or discomfort.       PROCEDURE: Excisional debridement of left medial lower leg wound  -2% viscous lidocaine applied topically to wound bed for approximately 5 minutes prior to debridement  -Curette used to debride wound bed.  Excisional debridement was performed to remove  devitalized tissue until healthy, bleeding tissue was visualized.   Entire surface of wound, 3.6 cm2 debrided.  Tissue debrided into the muscle layer.    -Bleeding controlled with manual pressure.    -Wound care completed by wound RN, refer to flowsheet  -Patient tolerated the procedure well, without c/o pain or discomfort.      BIOLOGIC LOG  5/20/2022-first application.  PRODUCT: EpiCord 2 x 3 cm . PRODUCT #[id number]; EXPIRES: 2026-12-01 5/27/2022- second application.  PRODUCT: EpiCordEX 2 x 3 cm . PRODUCT #[id number]; EXPIRES: 2026-09-01    6/3/2022- third application.  PRODUCT: EpiCordEX 2 x 3 cm . PRODUCT #[id number]; EXPIRES: 2026-10-01    6/10/2022- fourth application.  PRODUCT: EpiCordEX 2 x 3 cm . PRODUCT #[id number]; EXPIRES: 2026-09-01 6/17/2022- fifth application.  PRODUCT: EpiCordEX 2 x 3 cm . PRODUCT #[id number]; EXPIRES: 2027-02-01 6/24/2022- sixth application.  PRODUCT: EpiCordEX 2 x 3 cm . PRODUCT #[id number]; EXPIRES: 2027-02-01 07/01/22: Seventh application.  Product: Epicort Dx 2.0 x 3.0 cm reorder number [id number]; product number [id number]; expires 2027-02-01 7/22/2022- eighth application.  PRODUCT: EpiCord 2 x 3 cm, reorder #[id number]. PRODUCT #[id number]; EXPIRES: 2027-02-01      Pertinent Labs and Diagnostics:    Labs:     A1c: No results found for: HBA1C     [facility] results, 7/1/2022 (under media tab)    CRP 13    ESR 31      IMAGING: No results found.    VASCULAR STUDIES: No results found.    LAST  WOUND CULTURE:   Lab Results   Component Value Date/Time    CULTRSULT - (A) 09/01/2022 02:00 PM    CULTRSULT (A) 09/01/2022 02:00 PM     Lactose fermenting Gram negative brielle  ,000 cfu/mL            ASSESSMENT AND PLAN:     1. Sacral decubitus ulcer, stage IV (HCC)  Comments: Ulcer first noted in early April 2022 as small open area which quickly enlarged.  This ulcer is present distal from previous sacral ulcer which  healed after surgery in January.  Patient has history of flap reconstruction x2 to this area.    9/2/2022: Decent granulation noted within the wound bed, however bone still exposed, and tract depths are about the same  -Visional debridement performed in clinic today to promote granulation tissue formation  -Patient is to return to clinic weekly for assessment and debridement if needed  -Health to continue to change the wound VAC dressing 2 times per week in between clinic visits.  -Plastic surgery is not an option for this patient.  We have not been able to find a surgeon in [facility] willing to perform surgery on him.  -Healing of this wound is further complicated by the patient's multiple comorbidities, exposed bone, and significant pressure from sitting in his wheelchair  -I suspect that due to exposed bone, patient may have osteomyelitis of his sacrum.  He cannot undergo MRI due to metallic hardware.  Consider CT with contrast      Wound care: Puracyn gel to wound bed, NPWT at 125 mmHg to accelerate granulation, change 3 times per week.      2.  Postoperative wound dehiscence, subsequent encounter  Comments: On 4/26 patient underwent irrigation and debridement of multiple compartments of the left lower extremity states for pressure injury, with bone excision, and complex closure of chronic wound using biologic skin substitute.  During postop visit in surgeons office on 5/11, surgical site was noted to be dehisced.  Patient was referred to wound clinic for management.    9/2/2022: Tissue quality remains poor, boggy and fragile.  Probes to bone  -Excisional debridement of wound in clinic today, medically necessary to promote wound healing.  -Patient to return to clinic weekly for assessment and debridement  -Home health to change dressing 1-2 times per week in between clinic visits   -Consider CT of leg also to rule out OM    Wound care: Silver Hydrofiber to manage exudate and bioburden, Mepilex, Tubigrip D to manage  edema      3.  Pressure injury of left leg, deep tissue injury  Comments: DTI to posterior left lower leg first observed in clinic 7/29/2022.  Patient does not know how it started, had been wearing heel float boot consistently.    9/2/2022: Tissue still dark and well demarcated, skin intact  -Tissue is intact at this time  -Continue to monitor for any deterioration in tissue quality  -Patient is currently wearing Prevalon boots to help prevent pressure injuries to bilateral lower extremities    Wound care: Mepilex, Tubigrip D      4.  Quadriplegia, C5-C7, complete (HCC)  Comments: Complicating factor.  Impaired mobility and sensation    -Patient is still spending 7-8 hours/day up in his wheelchair, though he does have appropriate offloading cushion, and knows to reposition frequently.  Wears heel float boots bilaterally at all times      Please note that this note may have been created using voice recognition software. I have worked with technical experts from [employer] to optimize the interface.  I have made every reasonable attempt to correct obvious errors, but there may be errors of grammar and possibly content that I did not discover before finalizing the note.

## 2022-09-09 ENCOUNTER — OFFICE VISIT (OUTPATIENT)
Dept: WOUND CARE | Facility: MEDICAL CENTER | Age: 72
End: 2022-09-09
Attending: INTERNAL MEDICINE
Payer: MEDICARE

## 2022-09-09 VITALS
DIASTOLIC BLOOD PRESSURE: 92 MMHG | RESPIRATION RATE: 20 BRPM | SYSTOLIC BLOOD PRESSURE: 152 MMHG | OXYGEN SATURATION: 95 % | HEART RATE: 49 BPM | TEMPERATURE: 97.9 F

## 2022-09-09 DIAGNOSIS — T81.31XD POSTOPERATIVE WOUND DEHISCENCE, SUBSEQUENT ENCOUNTER: ICD-10-CM

## 2022-09-09 DIAGNOSIS — L89.890 PRESSURE INJURY OF LEFT LEG, UNSTAGEABLE (HCC): ICD-10-CM

## 2022-09-09 DIAGNOSIS — G82.53 QUADRIPLEGIA, C5-C7, COMPLETE (HCC): ICD-10-CM

## 2022-09-09 DIAGNOSIS — L89.154 SACRAL DECUBITUS ULCER, STAGE IV (HCC): ICD-10-CM

## 2022-09-09 PROCEDURE — 11043 DBRDMT MUSC&/FSCA 1ST 20/<: CPT | Performed by: STUDENT IN AN ORGANIZED HEALTH CARE EDUCATION/TRAINING PROGRAM

## 2022-09-09 PROCEDURE — 11043 DBRDMT MUSC&/FSCA 1ST 20/<: CPT

## 2022-09-09 NOTE — PATIENT INSTRUCTIONS
Should you experience any significant changes in your wound(s) such as infection (redness, swelling, localized heat, increased pain, fever >101 F, chills) or have any questions regarding your home care instructions, please contact the wound center [phone]. If after hours, contact your primary care physician or go the hospital emergency room.  Keep dressing clean and dry and cover while bathing. Only change dressing if over saturated, soiled or its falling off.

## 2022-09-09 NOTE — PROGRESS NOTES
Provider Encounter- Pressure Injury        HISTORY OF PRESENT ILLNESS  Wound History:    START OF CARE IN CLINIC: 5/3/2022    REFERRING PROVIDER: Sahara Mendez      WOUNDS- Pressure Injury, Sacrococcygeal, stage IV                                                             Surgical wound dehiscence, left medial lower leg-status post surgical I&D of stage IV pressure injury and           biologic application                       Abrasion to left anterior lower leg-first observed in clinic 7/22/2022          Pressure injury, DTI, left posterior lower leg-first observed in clinic 7/29/2022       HISTORY: Patient with history of incomplete quadriplegia referred to [facility] for treatment of a stage IV pressure injury.  He has a history of previous pressure injuries to this area, and underwent muscle flaps in 2019, and then again in 2020.  He was seen in the wound clinic in November 2021 for an ulcer proximal from his current ulcer, and pressure injuries to his left posterior lower leg and left heel.  At that time, it was discovered that the patient had retained VAC foam embedded in the wound bed of the sacral wound.  Attempts were made to get him back to his plastic surgeon, though unsuccessful.  In January he underwent surgical removal of VAC sponge along with excisional debridement of his sacral wound by Dr. Chavse.  After the surgery, his wound went on to heal without incident.   In early April 2022, his home health nurse noted a new sacral ulcer, below the previous ulcer which quickly tripled in size over the following weeks.  The ulcer to his left medial lower leg had also deteriorated, with bone visible at the base..  He was hospitalized from 4/22 until 4/27/2022 and underwent surgery with Dr. Raman on 4/26 for irrigation and debridement of multiple compartments of the left lower extremity, bone excision, and complex closure of chronic wound using biologic skin substitute.   His sacrococcygeal wound was not  surgically addressed during this admission.  He was discharged back to his group home, with home health, and referral to outpatient wound clinic for his sacral wound.  He was instructed to follow-up with his surgeon for his lower leg wound.       Postoperatively, the left medial lower leg incision dehisced.  He was seen by his surgeon at [facility] on 5/11.  The surgeon opted to leave remaining sutures in place, and refer him to the wound clinic for treatment of this wound.   Treatment of this wound was initiated in clinic on 5/12.  During this visit was also noted that his heel DTI had resolved, but that he had a new pressure injury to his left posterior lower extremity.     A new pressure injury was noted to patient's right upper buttock/lower back on 5/20/2022.  Wound was linear in shape, skin discolored but intact.     Abrasion noted to left anterior lower leg.  First observed in clinic on 7/22/2022.  Patient states he bumped his leg into his food tray.     Small DTI noted to patient's left lateral lower leg on 7/29/2022.  Skin intact but discolored.     Large area of deep tissue injury noted to patient's left exterior lower leg.  Patient denied any trauma to this area.  Skin intact.  Wound documented.    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  Never used smokeless tobacco    Patient's problem list, allergies, and current medications reviewed and updated in Epic    Interval History:  Interval History thinned 7/29/2022.  Please see previous notes for complete interval history.     7/29/2022 : Clinic visit with ADILSON Arthur, FNP-BC, CWDINESHN, CFCN.  Turk states he continues to do  well.  He was able to go to [place] yesterday, spent today [place].  His left lower extremity wound has deteriorated, presents today with significant odor and deterioration of tissue.  VAC held from this wound today after debridement.  I also did not apply biologic today.   Sacral wound about the same in area, though some evidence of increasing granulation.  No evidence of infection.  Small abrasion to his left lower leg appears to be improving.  He has a new small DTI to his left lateral lower leg, skin intact but discolored.    8/5/2022 : Clinic visit with ADILSON Arthur, HOLDEN, IMAN, LILIYA.  Anjum continues to feel well.  His left lower wound has improved with VAC hold, will discontinue from this wound altogether.  I did not apply biologic to this wound today given its current improvement.  DTI to posterior left leg is a bit darker today, skin still intact.  Patient states he has been wearing his heel float boot at all times.  Sacral wound with increased granulation, slight decrease in depth, bone still exposed.    8/12/2022 : Clinic visit with ADILSON Arthur, HOLDEN, IMAN, LILIYA.   Anjum states he is feeling well.  His left lower leg wound continues to improve without the use of VAC or biologic.  The deep tissue injury to his posterior leg is gradually improving, area decreasing.  The abrasion to his left shin is now open, was covered with scab last week.  Sacral wound improving slowly, increase granulation, slight decrease in area.  Home health had messaged that they were concerned for infection due to odor.  I did not appreciate any significant odor today.  We did add Puracyn gel to the wound bed prior to reapplying VAC.  Anjum continues to spend most of his day up in his wheelchair, though tries to reposition frequently, and is wearing heel float boots bilaterally at all times.    8/19/2022 : Clinic visit with ADILSON Arthur, HOLDEN, IMAN, LILIYA.   Anjum states he is in his usual state of health,  feeling well overall.  All of his wounds are progressing, though very slowly.  He continues to spend most of the day up in his wheelchair.  He does know to shift his weight frequently, and has an appropriate pressure relief cushion in his chair.    He was seen by [facility] earlier this week, for complaints of leakage around his suprapubic catheter and fever.  He was prescribed Keflex, and his catheter was changed    8/26/2022: Clinic visit with ADILSON Flores.  Patient reports overall he is feeling well denies any fever or chills.  Patient reports that he is continuously wearing Prevalon boots to offload bilateral lower extremities.  The left medial lower extremity wound is quite boggy with a small amount of turbulent fluid which was expressed with minimal palpation to the periwound area.  There is no foul-smelling odor emanating from the wound bed there is no erythema or edema.    9/2/2022 : Clinic visit with ADILSON Arthur, FNP-BC, CWOCN, CFCN.   States he is feeling well overall, denies fevers, chills, nausea, vomiting, cough or shortness of breath.  Unfortunately, his wounds are not progressing significantly.  Leg wound presents with boggy tissue, and probes to bone.  Area and depth of sacral wound have not changed significantly, bone still exposed.  Previously has been seen by several different plastic surgeons, including Dr. Rodriguez, Dr. Nicolas,  Dr. Mott, Dr. Chaves, he was also referred to Dr. Browne at [facility].  None of these surgeons have agreed to see him thus far.    9/9/2022: Clinic visit with Steve Hodges MD. Patient reports doing ok. Denies any changes to health or symptoms of infection. Wounds are not progressing, leg wound can be probed to bone and tissue is boggy. Wound VAC to sacrum with bone still exposed. He reports previously being treated for OM for 6 weeks without resolution. Discussed possibly getting imaging to eval for OM, however with chronic OM there is limited to no role  for prolonged Abx therapy per IDSA guidelines.     REVIEW OF SYSTEMS:   Unchanged from previous clinic visit on 9/2/2022    PHYSICAL EXAMINATION:   BP (!) 152/92 (BP Location: Left arm, Patient Position: Sitting) Comment: RN notified  Pulse (!) 49   Temp 36.6 °C (97.9 °F) (Temporal)   Resp 20   SpO2 95%   Physical Exam  Constitutional:       Appearance: He is obese.   Cardiovascular:      Rate and Rhythm: Bradycardia present.   Pulmonary:      Effort: Pulmonary effort is normal. No respiratory distress.      Breath sounds: No wheezing.   Skin:     Comments: Stage IV coccygeal pressure injury- Wound without significant change, bone still present in wound bed  Full-thickness wound to left medial lower leg- Unchanged, tissue boggy and friable, wound can be probed to bone.   No evidence of acute soft tissue infection    See flow sheet for details   Neurological:      Mental Status: He is alert and oriented to person, place, and time.       WOUND ASSESSMENT  Wound 04/11/22 Full Thickness Wound Leg Medial Left --Left Medial Lower Leg (Active)   Wound Image    09/09/22 1400   Site Assessment Red;Purple;Boggy 09/09/22 1400   Periwound Assessment Edema;Fragile 09/09/22 1400   Margins Attached edges 09/09/22 1400   Drainage Amount Moderate 09/09/22 1400   Drainage Description Serosanguineous 09/09/22 1400   Treatments Cleansed;Provider debridement;Site care 09/09/22 1400   Wound Cleansing Puracyn [place] 09/09/22 1400   Periwound Protectant Skin Protectant Wipes to Periwound;Barrier Paste 09/09/22 1400   Dressing Cleansing/Solutions Not Applicable 09/09/22 1400   Dressing Options Hydrofiber Silver;Mepilex;Tubigrip 09/09/22 1400   Dressing Changed Changed 08/19/22 1400   Dressing Change/Treatment Frequency Monday, Wednesday, Friday, and As Needed 07/29/22 1424   Non-staged Wound Description Full thickness 09/09/22 1400   Wound Length (cm) 3 cm 09/09/22 1400   Wound Width (cm) 1.1 cm 09/09/22 1400   Wound Depth (cm) 1.5 cm  09/09/22 1400   Wound Surface Area (cm^2) 3.3 cm^2 09/09/22 1400   Wound Volume (cm^3) 4.95 cm^3 09/09/22 1400   Post-Procedure Length (cm) 3 cm 09/09/22 1400   Post-Procedure Width (cm) 1.2 cm 09/09/22 1400   Post-Procedure Depth (cm) 1.5 cm 09/09/22 1400   Post-Procedure Surface Area (cm^2) 3.6 cm^2 09/09/22 1400   Post-Procedure Volume (cm^3) 5.4 cm^3 09/09/22 1400   Wound Healing % -[id number] 09/09/22 1400   Tunneling (cm) 0 cm 09/09/22 1400   Tunneling Clock Position of Wound 12 07/22/22 1400   Undermining (cm) 0 cm 09/09/22 1400   Undermining of Wound, 1st Location From 2 o'clock;To 4 o'clock 06/03/22 1300   Wound Odor None 09/09/22 1400   Exposed Structures Bone 09/09/22 1400   Number of days: 152       Wound 04/22/22 Pressure Injury Sacrum POA stage 4 (Active)   Wound Image    09/09/22 1400   Site Assessment Red;Other (Comment) 09/09/22 1400   Periwound Assessment Scar tissue 09/09/22 1400   Margins Unattached edges 09/09/22 1400   Drainage Amount Small 09/09/22 1400   Drainage Description Serosanguineous 09/09/22 1400   Treatments Cleansed;Provider debridement;Site care 09/09/22 1400   Wound Cleansing Puracyn [place] 09/09/22 1400   Periwound Protectant Benzoin;Paste Ring;Skin Protectant Wipes to Periwound;Drape 09/09/22 1400   Dressing Cleansing/Solutions Not Applicable 09/09/22 1400   Dressing Options Puracyn Gel;Wound Vac;Mepilex 09/09/22 1400   Dressing Changed Changed 07/29/22 1424   Dressing Change/Treatment Frequency Monday, Wednesday, Friday, and As Needed 07/29/22 1424   WOUND NURSE ONLY - Pressure Injury Stage 4 09/09/22 1400   Wound Length (cm) 2 cm 09/09/22 1400   Wound Width (cm) 2.6 cm 09/09/22 1400   Wound Depth (cm) 2 cm 09/09/22 1400   Wound Surface Area (cm^2) 5.2 cm^2 09/09/22 1400   Wound Volume (cm^3) 10.4 cm^3 09/09/22 1400   Post-Procedure Length (cm) 2 cm 09/09/22 1400   Post-Procedure Width (cm) 2.6 cm 09/09/22 1400   Post-Procedure Depth (cm) 2 cm 09/09/22 1400   Post-Procedure Surface  Area (cm^2) 5.2 cm^2 09/09/22 1400   Post-Procedure Volume (cm^3) 10.4 cm^3 09/09/22 1400   Wound Healing % -333 09/09/22 1400   Tunneling (cm) 0 cm 09/09/22 1400   Tunneling Clock Position of Wound 12 05/03/22 1600   Undermining (cm) 3.2 cm 09/09/22 1400   Undermining of Wound, 1st Location From 9 o'clock;To 3 o'clock 09/09/22 1400   Undermining (cm) - 2nd location 3 cm 07/15/22 1400   Undermining of Wound, 2nd Location From 9 o'clock;To 11 o'clock 07/15/22 1400   Undermining (cm) - 3rd location 3.2 cm 06/24/22 1000   Undermining of Wound, 3rd Location From 7 o'clock;To 11 o'clock 06/24/22 1000   Wound Odor None 09/09/22 1400   Exposed Structures Bone 09/09/22 1400   Number of days: 141       Wound 07/22/22 Traumatic Leg Anterior Left (Active)   Wound Image   09/09/22 1400   Site Assessment Purple;Red 09/09/22 1400   Periwound Assessment Edema;Fragile 09/09/22 1400   Margins Attached edges 08/19/22 1400   Drainage Amount None 09/09/22 1400   Drainage Description Serosanguineous 08/19/22 1400   Treatments Cleansed 09/09/22 1400   Wound Cleansing Foam Cleanser/Washcloth 09/09/22 1400   Periwound Protectant Barrier Paste 09/09/22 1400   Dressing Cleansing/Solutions Not Applicable 09/09/22 1400   Dressing Options Silicone Adhesive Foam;Tubigrip 09/09/22 1400   Dressing Changed Changed 07/29/22 1424   Dressing Change/Treatment Frequency Monday, Wednesday, Friday, and As Needed 07/29/22 1424   Non-staged Wound Description Full thickness 08/12/22 1300   Wound Length (cm) 0.4 cm 08/19/22 1400   Wound Width (cm) 0.4 cm 08/19/22 1400   Wound Depth (cm) 0.1 cm 08/19/22 1400   Wound Surface Area (cm^2) 0.16 cm^2 08/19/22 1400   Wound Volume (cm^3) 0.016 cm^3 08/19/22 1400   Post-Procedure Length (cm) 0.9 cm 08/12/22 1300   Post-Procedure Width (cm) 0.5 cm 08/12/22 1300   Post-Procedure Depth (cm) 0.1 cm 08/12/22 1300   Post-Procedure Surface Area (cm^2) 0.45 cm^2 08/12/22 1300   Post-Procedure Volume (cm^3) 0.045 cm^3 08/12/22  1300   Tunneling (cm) 0 cm 09/02/22 1430   Undermining (cm) 0 cm 09/02/22 1430   Wound Odor None 09/09/22 1400   Exposed Structures None 09/09/22 1400   Number of days: 50       Wound 07/29/22 Pressure Injury Leg Posterior;Lateral Left (Active)   Wound Image   09/09/22 1400   Site Assessment Purple 09/09/22 1400   Periwound Assessment Blanchable erythema 09/09/22 1400   Drainage Amount None 09/09/22 1400   Drainage Description Sanguineous 08/12/22 1300   Treatments Cleansed 09/09/22 1400   Wound Cleansing Foam Cleanser/Washcloth 09/09/22 1400   Periwound Protectant Barrier Paste 09/09/22 1400   Dressing Cleansing/Solutions Not Applicable 09/09/22 1400   Dressing Options Mepilex;Tubigrip 09/09/22 1400   Dressing Changed Changed 08/19/22 1400   Dressing Change/Treatment Frequency Monday, Wednesday, Friday, and As Needed 07/29/22 1424   WOUND NURSE ONLY - Pressure Injury Stage DTPI 09/09/22 1400   Wound Length (cm) 0.1 cm 08/12/22 1300   Wound Width (cm) 0.1 cm 08/12/22 1300   Wound Depth (cm) 0.1 cm 08/12/22 1300   Wound Surface Area (cm^2) 0.01 cm^2 08/12/22 1300   Wound Volume (cm^3) 0.001 cm^3 08/12/22 1300   Tunneling (cm) 0 cm 09/02/22 1430   Undermining (cm) 0 cm 09/02/22 1430   Wound Odor None 09/09/22 1400   Exposed Structures None 09/09/22 1400   Number of days: 43         PROCEDURE: Excisional debridement of sacrococcygeal wound and left medial lower leg wound.  -2% viscous lidocaine applied topically to wound bed for approximately 5 minutes prior to debridement  -Curette used to debride wound bed.  Excisional debridement was performed to remove devitalized tissue until healthy, bleeding tissue was visualized.  Debridement was carried into the undermined areas of the wound.  Total area debrided approximately 8.8 cm² (including into wound undermining).  Deepest level of debridement was into the muscle layer.  -Bleeding controlled with manual pressure.    -Wound care completed by wound RN, refer to  flowsheet  -Patient tolerated the procedure well, without c/o pain or discomfort.     BIOLOGIC LOG  5/20/2022-first application.  PRODUCT: EpiCord 2 x 3 cm . PRODUCT #[id number]; EXPIRES: 2026-12-01 5/27/2022- second application.  PRODUCT: EpiCordEX 2 x 3 cm . PRODUCT #[id number]; EXPIRES: 2026-09-01    6/3/2022- third application.  PRODUCT: EpiCordEX 2 x 3 cm . PRODUCT #[id number]; EXPIRES: 2026-10-01    6/10/2022- fourth application.  PRODUCT: EpiCordEX 2 x 3 cm . PRODUCT #[id number]; EXPIRES: 2026-09-01 6/17/2022- fifth application.  PRODUCT: EpiCordEX 2 x 3 cm . PRODUCT #[id number]; EXPIRES: 2027-02-01 6/24/2022- sixth application.  PRODUCT: EpiCordEX 2 x 3 cm . PRODUCT #[id number]; EXPIRES: 2027-02-01 07/01/22: Seventh application.  Product: Epicort Dx 2.0 x 3.0 cm reorder number [id number]; product number [id number]; expires 2027-02-01 7/22/2022- eighth application.  PRODUCT: EpiCord 2 x 3 cm, reorder #[id number]. PRODUCT #[id number]; EXPIRES: 2027-02-01      Pertinent Labs and Diagnostics:    Labs:     A1c: No results found for: HBA1C     [facility] results, 7/1/2022 (under media tab)    CRP 13    ESR 31      IMAGING: No results found.    VASCULAR STUDIES: No results found.    LAST  WOUND CULTURE:   Lab Results   Component Value Date/Time    CULTRSULT Usual skin ade <10,000 cfu/mL (A) 09/01/2022 02:00 PM    CULTRSULT Klebsiella pneumoniae  ,000 cfu/mL   (A) 09/01/2022 02:00 PM    CULTRSULT (A) 09/01/2022 02:00 PM     Proteus mirabilis ESBL  10-50,000 cfu/mL  Extended Spectrum Beta-lactamase (ESBL) isolated.  ESBL's may be clinically resistant to therapy with  Penicillins,Cephalosporins or Aztreonam despite  apparent in vitro susceptibility to some of these agents.  The patient requires contact isolation.  Please contact pharmacy or an Infectious Disease Specialist  if you have any questions about appropriate therapy.             ASSESSMENT AND PLAN:     1. Sacral decubitus ulcer, stage IV ([facility])  Comments: Ulcer first noted in early April 2022 as small open area which quickly enlarged.  This ulcer is present distal from previous sacral ulcer which healed after surgery in January.  Patient has history of flap reconstruction x2 to this area.    9/9/2022: Wound largely unchanged, continues to have exposed bone with undermining.  -Visional debridement performed in clinic today to promote granulation tissue formation  -Patient is to return to clinic weekly for assessment and debridement if needed  -Health to continue to change the wound VAC dressing 2 times per week in between clinic visits.  -Plastic surgery is not an option for this patient.  We have not been able to find a surgeon in [facility] willing to perform surgery on him.  -Healing of this wound is further complicated by the patient's multiple comorbidities, exposed bone, and significant pressure from sitting in his wheelchair  -I suspect that due to exposed bone, patient may have osteomyelitis of his sacrum.  He cannot undergo MRI due to metallic hardware.  Consider CT with contrast, however he reports that he was previously treated with 6 weeks Abx therapy. Unclear if there is utility to further treatment for chronic OM in absence of evidence of soft tissue infection.    Wound care: Puracyn gel to wound bed, NPWT at 125 mmHg to accelerate granulation, change 3 times per week.      2.  Postoperative wound dehiscence, subsequent encounter  Comments: On 4/26 patient underwent irrigation and debridement of multiple compartments of the left lower extremity states for pressure injury, with bone excision, and complex closure of chronic wound using biologic skin substitute.  During postop visit in surgeons office on 5/11, surgical site was noted to be dehisced.  Patient was referred to wound clinic for management.    9/9/2022: Tissue quality remains poor, boggy and friable.  Probes to  bone  -Excisional debridement of wound in clinic today, medically necessary to promote wound healing.  -Patient to return to clinic weekly for assessment and debridement  -Home health to change dressing 1-2 times per week in between clinic visits   -Consider CT of leg also to rule out OM, however unclear if any utility for imaging or Abx therapy for chronic OM.  - May consider resuming wound VAC therapy or biologic.    Wound care: Silver Hydrofiber to manage exudate and bioburden, Mepilex, Tubigrip D to manage edema      3.  Pressure injury of left leg, deep tissue injury  Comments: DTI to posterior left lower leg first observed in clinic 7/29/2022.  Patient does not know how it started, had been wearing heel float boot consistently.    9/9/2022: Tissue still dark and well demarcated, skin intact  -Tissue is intact at this time  -Continue to monitor for any deterioration in tissue quality  -Patient is currently wearing Prevalon boots to help prevent pressure injuries to bilateral lower extremities    Wound care: Mepilex, Tubigrip D      4.  Quadriplegia, C5-C7, complete (HCC)  Comments: Complicating factor.  Impaired mobility and sensation    -Patient is still spending 7-8 hours/day up in his wheelchair, though he does have appropriate offloading cushion, and knows to reposition frequently.  Wears heel float boots bilaterally at all times      Please note that this note may have been created using voice recognition software. I have worked with technical experts from [employer] to optimize the interface.  I have made every reasonable attempt to correct obvious errors, but there may be errors of grammar and possibly content that I did not discover before finalizing the note.

## 2022-09-11 ENCOUNTER — HOSPITAL ENCOUNTER (EMERGENCY)
Facility: MEDICAL CENTER | Age: 72
End: 2022-09-12
Attending: EMERGENCY MEDICINE
Payer: MEDICARE

## 2022-09-11 DIAGNOSIS — R33.9 URINARY RETENTION: ICD-10-CM

## 2022-09-11 LAB
APPEARANCE UR: ABNORMAL
BACTERIA #/AREA URNS HPF: ABNORMAL /HPF
BILIRUB UR QL STRIP.AUTO: NEGATIVE
COLOR UR: YELLOW
EPI CELLS #/AREA URNS HPF: NEGATIVE /HPF
GLUCOSE UR STRIP.AUTO-MCNC: NEGATIVE MG/DL
KETONES UR STRIP.AUTO-MCNC: NEGATIVE MG/DL
LEUKOCYTE ESTERASE UR QL STRIP.AUTO: ABNORMAL
MICRO URNS: ABNORMAL
NITRITE UR QL STRIP.AUTO: POSITIVE
PH UR STRIP.AUTO: 6.5 [PH] (ref 5–8)
PROT UR QL STRIP: 30 MG/DL
RBC # URNS HPF: ABNORMAL /HPF
RBC UR QL AUTO: ABNORMAL
SP GR UR STRIP.AUTO: 1.01
WBC #/AREA URNS HPF: ABNORMAL /HPF

## 2022-09-11 PROCEDURE — 51702 INSERT TEMP BLADDER CATH: CPT

## 2022-09-11 PROCEDURE — 87186 SC STD MICRODIL/AGAR DIL: CPT

## 2022-09-11 PROCEDURE — 51705 CHANGE OF BLADDER TUBE: CPT

## 2022-09-11 PROCEDURE — 99284 EMERGENCY DEPT VISIT MOD MDM: CPT

## 2022-09-11 PROCEDURE — 303105 HCHG CATHETER EXTRA

## 2022-09-11 PROCEDURE — 81001 URINALYSIS AUTO W/SCOPE: CPT

## 2022-09-11 PROCEDURE — 87086 URINE CULTURE/COLONY COUNT: CPT

## 2022-09-11 PROCEDURE — 87077 CULTURE AEROBIC IDENTIFY: CPT

## 2022-09-11 ASSESSMENT — FIBROSIS 4 INDEX: FIB4 SCORE: 2.76

## 2022-09-12 ENCOUNTER — NON-PROVIDER VISIT (OUTPATIENT)
Dept: CARDIOLOGY | Facility: MEDICAL CENTER | Age: 72
End: 2022-09-12
Payer: MEDICARE

## 2022-09-12 VITALS
DIASTOLIC BLOOD PRESSURE: 68 MMHG | SYSTOLIC BLOOD PRESSURE: 127 MMHG | OXYGEN SATURATION: 95 % | TEMPERATURE: 97.8 F | WEIGHT: 225 LBS | RESPIRATION RATE: 108 BRPM | HEART RATE: 57 BPM | HEIGHT: 70 IN | BODY MASS INDEX: 32.21 KG/M2

## 2022-09-12 PROCEDURE — 93298 REM INTERROG DEV EVAL SCRMS: CPT | Performed by: INTERNAL MEDICINE

## 2022-09-12 NOTE — ED NOTES
Called [facility] [phone]  Left voicemail to give report and DC instructions

## 2022-09-12 NOTE — ED TRIAGE NOTES
"72 yr old male   Chief Complaint   Patient presents with    Blood Pressure Problem    Urinary Catheter Problem     MARISA MONTES DE OCA from the senior care on [id number] [facility] for high blood pressure reading all day and catheter problems. Patient has a suprapubic catheter.  AAOX3     Temp 36.6 °C (97.9 °F) (Temporal)   Ht 1.778 m (5' 10\")   Wt 102 kg (225 lb)   BMI 32.28 kg/m²     "

## 2022-09-12 NOTE — ED NOTES
FALGUNI at bedside to transport pt back to [facility] no questions or concerns   [facility] manager called updated on pts ETA

## 2022-09-12 NOTE — ED NOTES
PT is a/ox4 no distress noted breathing is even & unlabored pt denies pain at this time  Bed in lowest position  Call light

## 2022-09-12 NOTE — ED PROVIDER NOTES
ED Provider Note    Scribed for Travis Fields M.D. by Nirav Josue. 9/11/2022  8:24 PM    Primary care provider: KATE Pretty  Means of arrival: EMS  History obtained from: Patient  History limited by: None    CHIEF COMPLAINT  Chief Complaint   Patient presents with    Blood Pressure Problem    Urinary Catheter Problem     BIB [facility] from the [facility] on [id number] [facility] for high blood pressure reading all day and catheter problems. Patient has a suprapubic catheter.  AAOX3       HPI  Osvaldo Pate is a 72 y.o. male who presents to the Emergency Department via EMS for hypertension. The patient also reports they are having problems with their suprapubic catheter.    REVIEW OF SYSTEMS  Pertinent positives include: blood pressure changes. Pertinent negatives include: fever, pain, redness, foul smelling urine, rash. See history of present illness. All other systems are negative.     PAST MEDICAL HISTORY   has a past medical history of A-fib ([facility]), Acute cystitis without hematuria (10/23/2021), Atrial flutter ([facility]), Blood clotting disorder ([facility]), Bowel habit changes, GERD (gastroesophageal reflux disease), Hypertension, Neurogenic bladder, and Quadriplegia, C5-C7 complete ([facility]).    SURGICAL HISTORY   has a past surgical history that includes percutaneouospinning lower extremity; colostomy; colostomy takedown; colostomy (N/A, 7/27/2019); ulcer debridement (N/A, 8/21/2019); flap closure (8/21/2019); hernia repair; irrigation & debridement general (12/20/2020); cystoscopy,insert ureteral stent (Left, 12/16/2021); cysto/uretero/pyeloscopy, dx (Left, 12/16/2021); lasertripsy (Left, 12/16/2021); cystoscopy,insert ureteral stent (Left, 1/4/2022); cysto/uretero/pyeloscopy, dx (Left, 1/4/2022); lasertripsy (N/A, 1/4/2022); incision and drainage orthopedic (1/22/2022); incision and drainage orthopedic (Left, 1/27/2022); bone biopsy (Left, 1/27/2022); irrigation & debridement general (Left,  "2022); wound closure neuro (Left, 2022); and orthopedic osteotomy (Left, 2022).    SOCIAL HISTORY  Social History     Tobacco Use    Smoking status: Former     Packs/day: 1.00     Types: Cigarettes     Start date: 1967     Quit date: 1977     Years since quittin.7    Smokeless tobacco: Never   Vaping Use    Vaping Use: Never used   Substance Use Topics    Alcohol use: Yes     Alcohol/week: 0.6 oz     Types: 1 Standard drinks or equivalent per week     Comment: nightly    Drug use: No      Social History     Substance and Sexual Activity   Drug Use No       FAMILY HISTORY  Family History   Problem Relation Age of Onset    Heart Disease Father        CURRENT MEDICATIONS  Home Medications       Reviewed by Criss Craven R.N. (Registered Nurse) on 22 at 1955  Med List Status: Complete     Medication Last Dose Status   amLODIPine (NORVASC) 10 MG Tab 2022 Active   Ascorbic Acid (VITAMIN C) 1000 MG Tab 2022 Active   baclofen (LIORESAL) 20 MG tablet 2022 Active   Cholecalciferol (VITAMIN D3) 2000 UNIT Cap 2022 Active   docusate sodium (COLACE) 100 MG Cap 2022 Active   ferrous sulfate 325 (65 Fe) MG tablet 2022 Active   losartan (COZAAR) 50 MG Tab 2022 Active   Magnesium 400 MG Tab 2022 Active   melatonin 5 mg Tab 9/10/2022 Active   Nutritional Supplements (ARGINAID) Pack 2022 Active   polyethylene glycol/lytes (MIRALAX) 17 g Pack 2022 Active   Probiotic Product (PROBIOTIC PO) 2022 Active   sennosides (SENOKOT) 8.6 MG Tab 2022 Active   Zinc 50 MG Tab 2022 Active                    ALLERGIES  Allergies   Allergen Reactions    Sulfa Drugs Rash     Rash, developed this back in  after being placed on \"sulfa antibiotic for my wound\". Antibiotic was stopped and rash went away. Patient states he had a sulfa antibiotic prior to that time back when he was younger w/o a reaction.          PHYSICAL EXAM  VITAL SIGNS: /66   " "Pulse (!) 57   Temp 36.6 °C (97.9 °F) (Temporal)   Resp 18   Ht 1.778 m (5' 10\")   Wt 102 kg (225 lb)   SpO2 95%   BMI 32.28 kg/m²     Constitutional: Alert in no apparent distress.  HENT: No signs of trauma, Bilateral external ears normal, Nose normal. Uvula midline.   Eyes: Pupils are equal and reactive, Conjunctiva normal, Non-icteric.   Neck: Normal range of motion, No tenderness, Supple, No stridor.   Lymphatic: No lymphadenopathy noted.   Cardiovascular: Regular rate and rhythm, no murmurs.   Thorax & Lungs: Normal breath sounds, No respiratory distress, No wheezing, No chest tenderness.   Abdomen:  Soft, No tenderness, No peritoneal signs, No masses, No pulsatile masses.  Well-appearing left lower quadrant ostomy bag.  Skin: Warm, Dry, No erythema, No rash.   Back: No bony tenderness, No CVA tenderness.  Well-appearing wound VAC in place over right sacrum.    Extremities: Intact distal pulses, No edema, No tenderness, No cyanosis.  No use of bilateral lower extremities.  5-5 strength in bilateral upper extremities.  Musculoskeletal: Good range of motion in all major joints. No tenderness to palpation or major deformities noted.   Neurologic: Alert , Normal motor function Normal sensory function above the level of the mid chest and no motor or sensory function below the., No new focal deficits noted.   Psychiatric: Affect normal, Judgment normal, Mood normal.     DIAGNOSTIC STUDIES / PROCEDURES    LABS  Labs Reviewed   URINALYSIS   URINE CULTURE(NEW)      All labs reviewed by me.      RADIOLOGY  No orders to display     The radiologist's interpretation of all radiological studies have been reviewed by me.    COURSE & MEDICAL DECISION MAKING  Nursing notes, Janet CHU reviewed in chart.    72 y.o. male p/w chief complaint of acute urinary retention and elevated blood pressure findings.    8:24 PM Patient seen and examined at bedside.      I verified that the patient was wearing a mask and I was wearing " "appropriate PPE every time I entered the room. The patient's mask was on the patient at all times during my encounter except for a brief view of the oropharynx.     The differential diagnoses include but are not limited to:     Patient is a paraplegic who is a sensate below the level of his nipple line.  Patient reports significantly elevated blood pressures prior to arrival today that \" have been all over the place both high and normal\".  Unremarkable blood pressures while in the emergency department today.  Patient noted to have Cazares catheter that was not flushing appropriately.    Urinalysis sent and I discussed this case with on-call urologist  by phone  No antibiotics given as patient denies any fever or foul-smelling urine and patient with close urology follow-up who agreed with no antibiotics at this time and urine culture only.  Given urinary retention that was fixed but switching Cazares catheters, Dr. Baltazar also agrees that this is reasonable to send patient home on no antibiotics at this time with plan to follow-up within the next week and a half and to return immediately if symptoms worsen or progress.    Patient agrees to return if symptoms worsen or progress and agrees to do so immediately given fluctuating blood pressures today.      FINAL IMPRESSION  1. Urinary retention          Nirav TERAN (Scribe), am scribing for, and in the presence of, Travis Fields M.D..    Electronically signed by: Nirav Josue (Aubrey), 9/11/2022    Travis TERAN M.D. personally performed the services described in this documentation, as scribed by Nirav Josue in my presence, and it is both accurate and complete.    The note accurately reflects work and decisions made by me.  Travis Fields M.D.  9/11/2022  11:05 PM    "

## 2022-09-13 ENCOUNTER — TELEPHONE (OUTPATIENT)
Dept: WOUND CARE | Facility: MEDICAL CENTER | Age: 72
End: 2022-09-13
Payer: MEDICARE

## 2022-09-13 NOTE — TELEPHONE ENCOUNTER
Modesta MENESES from [facility] called to state pt's wound sacral wound did have an odor when she removed the wound VAC on Monday 9/12/22. Modesta MENESES states odor disappeared after cleaning but pt was concerned and asked about possible wound culture so she called. Modesta MENESES noted she did not observe any other signs of symptoms of infection and added that she sometimes does notice odor on Mondays after the VAC has been on for 3 days. Discussed to keep monitoring the wound and call us back if pt has developed other signs and symptoms of infection at home health visit tomorrow 9/14/22.

## 2022-09-13 NOTE — CARDIAC REMOTE MONITOR - SCAN
Device transmission reviewed. Device demonstrated appropriate function.       Electronically Signed by: Briana Bell M.D.    9/21/2022  7:59 AM

## 2022-09-15 NOTE — ED NOTES
ED Positive Culture Follow-up/Notification Note:    Date: 9/15/2022     Patient seen in the ED on 9/11/2022 for hypertension and troubles with suprapubic catheter not flushing appropriately.   1. Urinary retention       Discharge Medication List as of 9/12/2022  1:19 AM          Allergies: Sulfa drugs     Vitals:    09/11/22 2037 09/11/22 2144 09/11/22 2244 09/12/22 0118   BP: (!) 90/56 103/62 110/66 127/68   Pulse: 63 62 (!) 57    Resp: 16 16 18 (!) 108   Temp:    36.6 °C (97.8 °F)   TempSrc:    Temporal   SpO2: 95% 96% 95% 95%   Weight:       Height:           Final cultures:   Results       Procedure Component Value Units Date/Time    URINE CULTURE(NEW) [[id number]]  (Abnormal)  (Susceptibility) Collected: 09/11/22 2315    Order Status: Completed Specimen: Urine Updated: 09/14/22 0731     Significant Indicator POS     Source UR     Site -     Culture Result Usual skin ade 10-50,000 cfu/mL      Klebsiella pneumoniae  >100,000 cfu/mL      Narrative:      Indication for culture:->Patient WITHOUT an indwelling Cazares  catheter in place with new onset of Dysuria, Frequency,  Urgency, and/or Suprapubic pain    QNS FOR URINE CULTURE  Indication for culture:->Patient WITHOUT an indwelling Cazares    Susceptibility       Klebsiella pneumoniae (1)       Antibiotic Interpretation Microscan   Method Status    Amikacin  [*]  Sensitive <=16 mcg/mL ROSE Final    Amoxicillin/CA  [*]  Sensitive <=8/4 mcg/mL ROSE Final    Aztreonam  [*]  Sensitive <=4 mcg/mL ROSE Final    Ceftolozane+Tazobactam  [*]  Sensitive <=2 mcg/mL ROSE Final    Ceftriaxone Sensitive <=1 mcg/mL ROSE Final    Ceftazidime  [*]  Sensitive <=1 mcg/mL ROSE Final    Cefazolin Sensitive <=2 mcg/mL ROSE Final     Breakpoints when Cefazolin is used for therapy of infections  other than uncomplicated UTIs due to Enterobacterales are as  follows:  ROSE and Interpretation:  <=2 S  4 I  >=8 R         Ciprofloxacin Sensitive <=0.25 mcg/mL ROSE Final    Cefepime Sensitive <=2 mcg/mL  ROSE Final    Cefuroxime Sensitive <=4 mcg/mL ROSE Final    Ceftazidime+Avibactam  [*]  Sensitive <=4 mcg/mL ROSE Final    Ampicillin/sulbactam Sensitive <=4/2 mcg/mL ROSE Final    Ertapenem  [*]  Sensitive <=0.5 mcg/mL ROSE Final    Tobramycin Sensitive <=2 mcg/mL ROSE Final    Nitrofurantoin Intermediate 64 mcg/mL ROSE Final    Gentamicin Sensitive <=2 mcg/mL ROSE Final    Imipenem  [*]  Sensitive <=1 mcg/mL ROSE Final    Levofloxacin Sensitive <=0.5 mcg/mL ROSE Final    Meropenem  [*]  Sensitive <=1 mcg/mL ROSE Final    Meropenem/Vaborbactam  [*]  Sensitive <=2 mcg/mL ROSE Final    Minocycline Sensitive <=4 mcg/mL ROSE Final    Pip/Tazobactam Sensitive <=8 mcg/mL ROSE Final    Trimeth/Sulfa Sensitive <=0.5/9.5 mcg/mL ROSE Final    Tetracycline  [*]  Sensitive <=4 mcg/mL ROSE Final    Tigecycline Sensitive <=2 mcg/mL ROSE Final               [*]  Suppressed Antibiotic                   URINALYSIS [[id number]]  (Abnormal) Collected: 09/11/22 2300    Order Status: Completed Specimen: Urine Updated: 09/11/22 2337     Color Yellow     Character Turbid     Specific Gravity 1.010     Ph 6.5     Glucose Negative mg/dL      Ketones Negative mg/dL      Protein 30 mg/dL      Bilirubin Negative     Nitrite Positive     Leukocyte Esterase Large     Occult Blood Moderate     Micro Urine Req Microscopic    Narrative:      Indication for culture:->Patient WITHOUT an indwelling Cazares  catheter in place with new onset of Dysuria, Frequency,  Urgency, and/or Suprapubic pain    QNS FOR URINE CULTURE            Plan:   Patient without symptoms of UTI during ED visit and given no antibiotics. PCP has now reviewed the culture and prescribed ciprofloxacin on 9/14.     Sherri Clemons, PharmD

## 2022-09-16 ENCOUNTER — OFFICE VISIT (OUTPATIENT)
Dept: WOUND CARE | Facility: MEDICAL CENTER | Age: 72
End: 2022-09-16
Attending: INTERNAL MEDICINE
Payer: MEDICARE

## 2022-09-16 ENCOUNTER — HOSPITAL ENCOUNTER (OUTPATIENT)
Facility: MEDICAL CENTER | Age: 72
End: 2022-09-16
Attending: STUDENT IN AN ORGANIZED HEALTH CARE EDUCATION/TRAINING PROGRAM
Payer: MEDICARE

## 2022-09-16 VITALS
TEMPERATURE: 97.7 F | OXYGEN SATURATION: 98 % | RESPIRATION RATE: 20 BRPM | HEART RATE: 48 BPM | DIASTOLIC BLOOD PRESSURE: 84 MMHG | SYSTOLIC BLOOD PRESSURE: 153 MMHG

## 2022-09-16 DIAGNOSIS — T81.31XD POSTOPERATIVE WOUND DEHISCENCE, SUBSEQUENT ENCOUNTER: ICD-10-CM

## 2022-09-16 DIAGNOSIS — L08.9 WOUND INFECTION: ICD-10-CM

## 2022-09-16 DIAGNOSIS — L89.154 SACRAL DECUBITUS ULCER, STAGE IV (HCC): ICD-10-CM

## 2022-09-16 DIAGNOSIS — T14.8XXA WOUND INFECTION: ICD-10-CM

## 2022-09-16 DIAGNOSIS — G82.53 QUADRIPLEGIA, C5-C7, COMPLETE (HCC): ICD-10-CM

## 2022-09-16 DIAGNOSIS — L89.890 PRESSURE INJURY OF LEFT LEG, UNSTAGEABLE (HCC): ICD-10-CM

## 2022-09-16 PROCEDURE — 97605 NEG PRS WND THER DME<=50SQCM: CPT

## 2022-09-16 PROCEDURE — 11043 DBRDMT MUSC&/FSCA 1ST 20/<: CPT | Performed by: STUDENT IN AN ORGANIZED HEALTH CARE EDUCATION/TRAINING PROGRAM

## 2022-09-16 PROCEDURE — 87070 CULTURE OTHR SPECIMN AEROBIC: CPT

## 2022-09-16 PROCEDURE — 87205 SMEAR GRAM STAIN: CPT

## 2022-09-16 PROCEDURE — 11042 DBRDMT SUBQ TIS 1ST 20SQCM/<: CPT

## 2022-09-16 NOTE — PROGRESS NOTES
Provider Encounter- Pressure Injury        HISTORY OF PRESENT ILLNESS  Wound History:    START OF CARE IN CLINIC: 5/3/2022    REFERRING PROVIDER: Sahara Mendez      WOUNDS- Pressure Injury, Sacrococcygeal, stage IV                                                             Surgical wound dehiscence, left medial lower leg-status post surgical I&D of stage IV pressure injury and           biologic application                       Abrasion to left anterior lower leg-first observed in clinic 7/22/2022          Pressure injury, DTI, left posterior lower leg-first observed in clinic 7/29/2022       HISTORY: Patient with history of incomplete quadriplegia referred to [facility] for treatment of a stage IV pressure injury.  He has a history of previous pressure injuries to this area, and underwent muscle flaps in 2019, and then again in 2020.  He was seen in the wound clinic in November 2021 for an ulcer proximal from his current ulcer, and pressure injuries to his left posterior lower leg and left heel.  At that time, it was discovered that the patient had retained VAC foam embedded in the wound bed of the sacral wound.  Attempts were made to get him back to his plastic surgeon, though unsuccessful.  In January he underwent surgical removal of VAC sponge along with excisional debridement of his sacral wound by Dr. Chaves.  After the surgery, his wound went on to heal without incident.   In early April 2022, his home health nurse noted a new sacral ulcer, below the previous ulcer which quickly tripled in size over the following weeks.  The ulcer to his left medial lower leg had also deteriorated, with bone visible at the base..  He was hospitalized from 4/22 until 4/27/2022 and underwent surgery with Dr. Raman on 4/26 for irrigation and debridement of multiple compartments of the left lower extremity, bone excision, and complex closure of chronic wound using biologic skin substitute.   His sacrococcygeal wound was not  surgically addressed during this admission.  He was discharged back to his group home, with home health, and referral to outpatient wound clinic for his sacral wound.  He was instructed to follow-up with his surgeon for his lower leg wound.       Postoperatively, the left medial lower leg incision dehisced.  He was seen by his surgeon at [facility] on 5/11.  The surgeon opted to leave remaining sutures in place, and refer him to the wound clinic for treatment of this wound.   Treatment of this wound was initiated in clinic on 5/12.  During this visit was also noted that his heel DTI had resolved, but that he had a new pressure injury to his left posterior lower extremity.     A new pressure injury was noted to patient's right upper buttock/lower back on 5/20/2022.  Wound was linear in shape, skin discolored but intact.     Abrasion noted to left anterior lower leg.  First observed in clinic on 7/22/2022.  Patient states he bumped his leg into his food tray.     Small DTI noted to patient's left lateral lower leg on 7/29/2022.  Skin intact but discolored.     Large area of deep tissue injury noted to patient's left exterior lower leg.  Patient denied any trauma to this area.  Skin intact.  Wound documented.    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  Never used smokeless tobacco    Patient's problem list, allergies, and current medications reviewed and updated in Epic    Interval History:  Interval History thinned 7/29/2022.  Please see previous notes for complete interval history.     7/29/2022 : Clinic visit with ADILSON Arthur, FNP-BC, CWDINESHN, CFCN.  Turk states he continues to do  well.  He was able to go to [employer] yesterday, spent today [place].  His left lower extremity wound has deteriorated, presents today with significant odor and deterioration of tissue.  VAC held from this wound today after debridement.  I also did not apply biologic today.   Sacral wound about the same in area, though some evidence of increasing granulation.  No evidence of infection.  Small abrasion to his left lower leg appears to be improving.  He has a new small DTI to his left lateral lower leg, skin intact but discolored.    8/5/2022 : Clinic visit with ADILSON Arthur, HOLDEN, IMAN, LILIYA.  Anjum continues to feel well.  His left lower wound has improved with VAC hold, will discontinue from this wound altogether.  I did not apply biologic to this wound today given its current improvement.  DTI to posterior left leg is a bit darker today, skin still intact.  Patient states he has been wearing his heel float boot at all times.  Sacral wound with increased granulation, slight decrease in depth, bone still exposed.    8/12/2022 : Clinic visit with ADILSON Arthur, HOLDEN, IMAN, LILIYA.   Anjum states he is feeling well.  His left lower leg wound continues to improve without the use of VAC or biologic.  The deep tissue injury to his posterior leg is gradually improving, area decreasing.  The abrasion to his left shin is now open, was covered with scab last week.  Sacral wound improving slowly, increase granulation, slight decrease in area.  Home health had messaged that they were concerned for infection due to odor.  I did not appreciate any significant odor today.  We did add Puracyn gel to the wound bed prior to reapplying VAC.  Anjum continues to spend most of his day up in his wheelchair, though tries to reposition frequently, and is wearing heel float boots bilaterally at all times.    8/19/2022 : Clinic visit with ADILSON Arthur, HOLDEN, IMAN, LILIYA.   Anjum states he is in his usual state of health,  feeling well overall.  All of his wounds are progressing, though very slowly.  He continues to spend most of the day up in his wheelchair.  He does know to shift his weight frequently, and has an appropriate pressure relief cushion in his chair.    He was seen by [facility] earlier this week, for complaints of leakage around his suprapubic catheter and fever.  He was prescribed Keflex, and his catheter was changed    8/26/2022: Clinic visit with ADILSON Flores.  Patient reports overall he is feeling well denies any fever or chills.  Patient reports that he is continuously wearing Prevalon boots to offload bilateral lower extremities.  The left medial lower extremity wound is quite boggy with a small amount of turbulent fluid which was expressed with minimal palpation to the periwound area.  There is no foul-smelling odor emanating from the wound bed there is no erythema or edema.    9/2/2022 : Clinic visit with ADILSON Arthur, FNP-BC, CWOCN, CFCN.   States he is feeling well overall, denies fevers, chills, nausea, vomiting, cough or shortness of breath.  Unfortunately, his wounds are not progressing significantly.  Leg wound presents with boggy tissue, and probes to bone.  Area and depth of sacral wound have not changed significantly, bone still exposed.  Previously has been seen by several different plastic surgeons, including Dr. Rodriguez, Dr. Nicolas,  Dr. Mott, Dr. Chaves, he was also referred to Dr. Browne at [facility].  None of these surgeons have agreed to see him thus far.    9/9/2022: Clinic visit with Steve Hodges MD. Patient reports doing ok. Denies any changes to health or symptoms of infection. Wounds are not progressing, leg wound can be probed to bone and tissue is boggy. Wound VAC to sacrum with bone still exposed. He reports previously being treated for OM for 6 weeks without resolution. Discussed possibly getting imaging to eval for OM, however with chronic OM there is limited to no role  for prolonged Abx therapy per IDSA guidelines.    9/16/2022: Clinic visit with Steve Hodges MD. Patient reports feeling his normal state of health, denies any fevers or chills. His medial left leg wound is boggy and I was able to express some tan fluid concerning for purulence. Will take wound culture and order abx as appropriate for the results. Will not start empirical Abx as no evidence of cellulitis and his history of multiple rounds of Abx in the past. Will order CT scan of lower extremity and sacrum to further evaluate areas. Sacrum still to bone, had previous history of some kind of retained material in wound bed, will obtain the CT scan to evaluate.     REVIEW OF SYSTEMS:   Unchanged from previous clinic visit on 9/9/2022    PHYSICAL EXAMINATION:   BP (!) 153/84 (BP Location: Left arm, Patient Position: Sitting) Comment: RN notified  Pulse (!) 48 Comment: RN notified  Temp 36.5 °C (97.7 °F) (Temporal)   Resp 20   SpO2 98%   Physical Exam  Constitutional:       Appearance: He is obese.   Cardiovascular:      Rate and Rhythm: Bradycardia present.   Pulmonary:      Effort: Pulmonary effort is normal. No respiratory distress.      Breath sounds: No wheezing.   Skin:     Comments: Stage IV coccygeal pressure injury- Wound without significant change, bone still present in wound bed  Full-thickness wound to left medial lower leg- Slightly smaller, tissue boggy and friable, wound can be probed to bone. When palpated, able to express turbid fluid / purulence. No evidence of cellulitis.  Left lower extremity DTIs improving    See flow sheet for details   Neurological:      Mental Status: He is alert and oriented to person, place, and time.       WOUND ASSESSMENT  Wound 04/11/22 Full Thickness Wound Leg Medial Left --Left Medial Lower Leg (Active)   Wound Image    09/16/22 1600   Site Assessment Red;Purple;Boggy 09/16/22 1600   Periwound Assessment Edema;Fragile 09/16/22 1600   Margins Attached edges 09/16/22  1600   Drainage Amount Moderate 09/16/22 1600   Drainage Description Serosanguineous 09/16/22 1600   Treatments Cleansed;Topical Lidocaine;Provider debridement;Site care 09/16/22 1600   Wound Cleansing Puracyn Pitcher 09/16/22 1600   Periwound Protectant Skin Protectant Wipes to Periwound;Barrier Paste 09/16/22 1600   Dressing Cleansing/Solutions Not Applicable 09/16/22 1600   Dressing Options Hydrofiber Silver;Mepilex;Tubigrip 09/16/22 1600   Dressing Changed Changed 08/19/22 1400   Dressing Change/Treatment Frequency Monday, Wednesday, Friday, and As Needed 07/29/22 1424   Non-staged Wound Description Full thickness 09/16/22 1600   Wound Length (cm) 2.5 cm 09/16/22 1600   Wound Width (cm) 0.9 cm 09/16/22 1600   Wound Depth (cm) 0.6 cm 09/16/22 1600   Wound Surface Area (cm^2) 2.25 cm^2 09/16/22 1600   Wound Volume (cm^3) 1.35 cm^3 09/16/22 1600   Post-Procedure Length (cm) 2.5 cm 09/16/22 1600   Post-Procedure Width (cm) 0.9 cm 09/16/22 1600   Post-Procedure Depth (cm) 1 cm 09/16/22 1600   Post-Procedure Surface Area (cm^2) 2.25 cm^2 09/16/22 1600   Post-Procedure Volume (cm^3) 2.25 cm^3 09/16/22 1600   Wound Healing % -2713 09/16/22 1600   Tunneling (cm) 0 cm 09/16/22 1600   Tunneling Clock Position of Wound 12 07/22/22 1400   Undermining (cm) 0 cm 09/16/22 1600   Undermining of Wound, 1st Location From 2 o'clock;To 4 o'clock 06/03/22 1300   Wound Odor None 09/16/22 1600   Exposed Structures Bone 09/16/22 1600   Number of days: 158       Wound 04/22/22 Pressure Injury Sacrum POA stage 4 (Active)   Wound Image    09/16/22 1600   Site Assessment Red;Other (Comment) 09/16/22 1600   Periwound Assessment Scar tissue 09/09/22 1400   Margins Unattached edges 09/16/22 1600   Drainage Amount Small 09/16/22 1600   Drainage Description Serosanguineous 09/16/22 1600   Treatments Cleansed;Provider debridement;Site care 09/16/22 1600   Wound Cleansing Puracyn Pitcher 09/16/22 1600   Periwound Protectant Benzoin;Paste Ring;Drape  09/16/22 1600   Dressing Cleansing/Solutions Not Applicable 09/16/22 1600   Dressing Options Wound Vac;Mepilex 09/16/22 1600   Dressing Changed Changed 09/16/22 1600   Dressing Change/Treatment Frequency Monday, Wednesday, Friday, and As Needed 07/29/22 1424   WOUND NURSE ONLY - Pressure Injury Stage 4 09/09/22 1400   Wound Length (cm) 1.9 cm 09/16/22 1600   Wound Width (cm) 1.3 cm 09/16/22 1600   Wound Depth (cm) 1.5 cm 09/16/22 1600   Wound Surface Area (cm^2) 2.47 cm^2 09/16/22 1600   Wound Volume (cm^3) 3.705 cm^3 09/16/22 1600   Post-Procedure Length (cm) 1.9 cm 09/16/22 1600   Post-Procedure Width (cm) 1.3 cm 09/16/22 1600   Post-Procedure Depth (cm) 1.5 cm 09/16/22 1600   Post-Procedure Surface Area (cm^2) 2.47 cm^2 09/16/22 1600   Post-Procedure Volume (cm^3) 3.705 cm^3 09/16/22 1600   Wound Healing % -54 09/16/22 1600   Tunneling (cm) 0 cm 09/16/22 1600   Tunneling Clock Position of Wound 12 05/03/22 1600   Undermining (cm) 2 cm 09/16/22 1600   Undermining of Wound, 1st Location From 9 o'clock;To 2 o'clock 09/16/22 1600   Undermining (cm) - 2nd location 3 cm 07/15/22 1400   Undermining of Wound, 2nd Location From 9 o'clock;To 11 o'clock 07/15/22 1400   Undermining (cm) - 3rd location 3.2 cm 06/24/22 1000   Undermining of Wound, 3rd Location From 7 o'clock;To 11 o'clock 06/24/22 1000   Wound Odor None 09/16/22 1600   Exposed Structures Bone 09/16/22 1600   Number of days: 147       Wound 07/22/22 Traumatic Leg Anterior Left (Active)   Wound Image   09/16/22 1600   Site Assessment Purple;Red 09/16/22 1600   Periwound Assessment Edema;Fragile 09/16/22 1600   Margins Attached edges 08/19/22 1400   Drainage Amount None 09/16/22 1600   Drainage Description Serosanguineous 08/19/22 1400   Treatments Cleansed;Site care 09/16/22 1600   Wound Cleansing Foam Cleanser/Washcloth 09/16/22 1600   Periwound Protectant Barrier Paste 09/16/22 1600   Dressing Cleansing/Solutions Not Applicable 09/16/22 1600   Dressing Options  Mepilex;Tubigrip 09/16/22 1600   Dressing Changed Changed 09/16/22 1600   Dressing Change/Treatment Frequency Monday, Wednesday, Friday, and As Needed 07/29/22 1424   Non-staged Wound Description Full thickness 08/12/22 1300   Wound Length (cm) 0.4 cm 08/19/22 1400   Wound Width (cm) 0.4 cm 08/19/22 1400   Wound Depth (cm) 0.1 cm 08/19/22 1400   Wound Surface Area (cm^2) 0.16 cm^2 08/19/22 1400   Wound Volume (cm^3) 0.016 cm^3 08/19/22 1400   Post-Procedure Length (cm) 0.9 cm 08/12/22 1300   Post-Procedure Width (cm) 0.5 cm 08/12/22 1300   Post-Procedure Depth (cm) 0.1 cm 08/12/22 1300   Post-Procedure Surface Area (cm^2) 0.45 cm^2 08/12/22 1300   Post-Procedure Volume (cm^3) 0.045 cm^3 08/12/22 1300   Tunneling (cm) 0 cm 09/16/22 1600   Undermining (cm) 0 cm 09/16/22 1600   Wound Odor None 09/16/22 1600   Exposed Structures None 09/16/22 1600   Number of days: 56       Wound 07/29/22 Pressure Injury Leg Posterior;Lateral Left (Active)   Wound Image   09/16/22 1600   Site Assessment Purple 09/16/22 1600   Periwound Assessment Blanchable erythema 09/16/22 1600   Drainage Amount None 09/16/22 1600   Drainage Description Sanguineous 08/12/22 1300   Treatments Cleansed;Site care 09/16/22 1600   Wound Cleansing Foam Cleanser/Washcloth 09/16/22 1600   Periwound Protectant Barrier Paste 09/16/22 1600   Dressing Cleansing/Solutions Not Applicable 09/16/22 1600   Dressing Options Mepilex;Tubigrip 09/16/22 1600   Dressing Changed Changed 09/16/22 1600   Dressing Change/Treatment Frequency Monday, Wednesday, Friday, and As Needed 07/29/22 1424   WOUND NURSE ONLY - Pressure Injury Stage DTPI 09/16/22 1600   Wound Length (cm) 0.1 cm 08/12/22 1300   Wound Width (cm) 0.1 cm 08/12/22 1300   Wound Depth (cm) 0.1 cm 08/12/22 1300   Wound Surface Area (cm^2) 0.01 cm^2 08/12/22 1300   Wound Volume (cm^3) 0.001 cm^3 08/12/22 1300   Tunneling (cm) 0 cm 09/16/22 1600   Undermining (cm) 0 cm 09/16/22 1600   Wound Odor None 09/16/22 1600    Exposed Structures None 09/16/22 1600   Number of days: 49         PROCEDURE: Excisional debridement of sacrococcygeal wound and left medial lower leg wound.  -2% viscous lidocaine applied topically to wound bed for approximately 5 minutes prior to debridement  -Curette used to debride wound bed.  Excisional debridement was performed to remove devitalized tissue until healthy, bleeding tissue was visualized.  Debridement was carried into the undermined areas of the wound.  Total area debrided approximately 4.72 cm² (including into wound undermining).  Deepest level of debridement was into the muscle layer.  -Bleeding controlled with manual pressure.    -Wound care completed by wound RN, refer to flowsheet  -Patient tolerated the procedure well, without c/o pain or discomfort.     BIOLOGIC LOG  5/20/2022-first application.  PRODUCT: EpiCord 2 x 3 cm . PRODUCT #[id number]; EXPIRES: 2026-12-01 5/27/2022- second application.  PRODUCT: EpiCordEX 2 x 3 cm . PRODUCT #[id number]; EXPIRES: 2026-09-01    6/3/2022- third application.  PRODUCT: EpiCordEX 2 x 3 cm . PRODUCT #[id number]; EXPIRES: 2026-10-01    6/10/2022- fourth application.  PRODUCT: EpiCordEX 2 x 3 cm . PRODUCT #[id number]; EXPIRES: 2026-09-01 6/17/2022- fifth application.  PRODUCT: EpiCordEX 2 x 3 cm . PRODUCT #[id number]; EXPIRES: 2027-02-01 6/24/2022- sixth application.  PRODUCT: EpiCordEX 2 x 3 cm . PRODUCT #[id number]; EXPIRES: 2027-02-01 07/01/22: Seventh application.  Product: Epicort Dx 2.0 x 3.0 cm reorder number [id number]; product number [id number]; expires 2027-02-01 7/22/2022- eighth application.  PRODUCT: EpiCord 2 x 3 cm, reorder #[id number]. PRODUCT #[id number]; EXPIRES: 2027-02-01      Pertinent Labs and Diagnostics:    Labs:     A1c: No results found for: HBA1C     [facility] results, 7/1/2022 (under media tab)    CRP 13    ESR 31      IMAGING: No results found.    VASCULAR  STUDIES: No results found.    LAST  WOUND CULTURE:   Lab Results   Component Value Date/Time    CULTRSULT Usual skin ade 10-50,000 cfu/mL (A) 09/11/2022 11:14 PM    CULTRSULT Klebsiella pneumoniae  >100,000 cfu/mL   (A) 09/11/2022 11:14 PM          ASSESSMENT AND PLAN:     1. Sacral decubitus ulcer, stage IV ([facility])  Comments: Ulcer first noted in early April 2022 as small open area which quickly enlarged.  This ulcer is present distal from previous sacral ulcer which healed after surgery in January.  Patient has history of flap reconstruction x2 to this area.    9/16/2022: Wound unchanged, continues to have exposed bone with undermining.  -Visional debridement performed in clinic today to promote granulation tissue formation  -Patient is to return to clinic weekly for assessment and debridement if needed  -Health to continue to change the wound VAC dressing 2 times per week in between clinic visits.  -Plastic surgery is not an option for this patient.  We have not been able to find a surgeon in [facility] willing to perform surgery on him.  -Healing of this wound is further complicated by the patient's multiple comorbidities, exposed bone, and significant pressure from sitting in his wheelchair  -I suspect that due to exposed bone, patient may have osteomyelitis of his sacrum.  He cannot undergo MRI due to metallic hardware.  Will order CT scan of pelvis to eval for OM and also evaluate as he had previous history of retained substance (tube) in wound bed previously.    Wound care: Puracyn gel to wound bed, NPWT at 125 mmHg to accelerate granulation, change 3 times per week.      2.  Postoperative wound dehiscence, subsequent encounter  Comments: On 4/26 patient underwent irrigation and debridement of multiple compartments of the left lower extremity states for pressure injury, with bone excision, and complex closure of chronic wound using biologic skin substitute.  During postop visit in surgeons office on 5/11, surgical  site was noted to be dehisced.  Patient was referred to wound clinic for management.    9/16/2022: Tissue quality remains poor, boggy and friable. Today able to express tan turbid fluid.  Probes to bone.  -Excisional debridement of wound in clinic today, medically necessary to promote wound healing.  - Concern for infection as able to express turbid fluid / purulence. Will order CT scan of left lower extremity and will send wound culture. Without evidence of cellulitis, will not prescribe empiric Abx and wait for culture results.  -Patient to return to clinic weekly for assessment and debridement  -Home health to change dressing 1-2 times per week in between clinic visits   - May consider resuming wound VAC therapy or biologic.    Wound care: Silver Hydrofiber to manage exudate and bioburden, Mepilex, Tubigrip D to manage edema      3.  Pressure injury of left leg, deep tissue injury  Comments: DTI to posterior left lower leg first observed in clinic 7/29/2022.  Patient does not know how it started, had been wearing heel float boot consistently.    9/16/2022: Improving  -Continue to monitor for any deterioration in tissue quality  -Patient is currently wearing Prevalon boots to help prevent pressure injuries to bilateral lower extremities    Wound care: Mepilex, Tubigrip D      4.  Quadriplegia, C5-C7, complete (HCC)  Comments: Complicating factor.  Impaired mobility and sensation  -Patient is still spending 7-8 hours/day up in his wheelchair, though he does have appropriate offloading cushion, and knows to reposition frequently.  Wears heel float boots bilaterally at all times    5. Wound Infection    9/16/2022  - Concern for wound infection left left lower extremity medial wound. Was able to express tan turbid / purulent fluid 9/16.  - Wound culture obtained. As no evidence of cellulitis or sepsis, will not prescribe empirical Abx and await culture results  - Ordered CT scan of left tib/fib to eval for deep soft  tissue infection / abscess vs osteomyelitis. Will order and obtain CT scan of sacrum due to history of retained object (drain tube?) and eval for OM.      Please note that this note may have been created using voice recognition software. I have worked with technical experts from [facility] to optimize the interface.  I have made every reasonable attempt to correct obvious errors, but there may be errors of grammar and possibly content that I did not discover before finalizing the note.

## 2022-09-16 NOTE — PATIENT INSTRUCTIONS
-Keep your wound dressing clean, dry, and intact.    -Change your dressing if it becomes soiled, soaked, or falls off.    -Wound vac may not have any drainage in tube or cannister & it will still be working.   Change cannister if it does become full by pressing tab on side of machine to remove canister and snap on new one. Full canister can be thrown in the trash. If cannister fills with bright red blood - go to ER. Dressing will be changed every MWF at the wound clini.  If you are having issues with your wound VAC, please consider patching leaks, changing the canister, or calling [phone] for troubleshooting. If the wound VAC has been off or un-operational for over 2 hours, call wound care center to inform them and remove all dressings including black foam and replace with normal saline damp gauze.     -Should you experience any significant changes in your wound(s), such as infection (redness, swelling, localized heat, increased pain, fever > 101 F, chills) or have any questions regarding your home care instructions, please contact the wound center at [phone]. If after hours, contact your primary care physician or go to the hospital emergency room.

## 2022-09-17 LAB
GRAM STN SPEC: NORMAL
SIGNIFICANT IND 70042: NORMAL
SITE SITE: NORMAL
SOURCE SOURCE: NORMAL

## 2022-09-18 LAB
BACTERIA WND AEROBE CULT: NORMAL
GRAM STN SPEC: NORMAL
SIGNIFICANT IND 70042: NORMAL
SITE SITE: NORMAL
SOURCE SOURCE: NORMAL

## 2022-09-20 ENCOUNTER — TELEPHONE (OUTPATIENT)
Dept: WOUND CARE | Facility: MEDICAL CENTER | Age: 72
End: 2022-09-20
Payer: MEDICARE

## 2022-09-20 DIAGNOSIS — T14.8XXA WOUND INFECTION: ICD-10-CM

## 2022-09-20 DIAGNOSIS — L08.9 WOUND INFECTION: ICD-10-CM

## 2022-09-20 PROBLEM — R00.1 BRADYCARDIA: Status: RESOLVED | Noted: 2019-12-09 | Resolved: 2022-09-20

## 2022-09-20 RX ORDER — AMOXICILLIN AND CLAVULANATE POTASSIUM 875; 125 MG/1; MG/1
1 TABLET, FILM COATED ORAL 2 TIMES DAILY
Qty: 28 TABLET | Refills: 0 | Status: SHIPPED | OUTPATIENT
Start: 2022-09-20 | End: 2022-09-30 | Stop reason: SDUPTHER

## 2022-09-21 ENCOUNTER — TELEPHONE (OUTPATIENT)
Dept: WOUND CARE | Facility: MEDICAL CENTER | Age: 72
End: 2022-09-21
Payer: MEDICARE

## 2022-09-21 NOTE — TELEPHONE ENCOUNTER
Brief Wound Care Provider Note    Patient wound culture grew mixed enteric ade. Due to depth of wound and depth of wound culture obtained. Will order 14 day course of Augmentin.    Called patient to update him. He reports doing well, denies feeling ill. Reports he has taken Augmentin in past without difficulty.    He has scheduled imaging on 10/3. He request order to home health to obtain labs at home. We will work on that tomorrow when clinic staff available.

## 2022-09-22 ENCOUNTER — HOSPITAL ENCOUNTER (OUTPATIENT)
Facility: MEDICAL CENTER | Age: 72
End: 2022-09-22
Attending: PHYSICIAN ASSISTANT
Payer: MEDICARE

## 2022-09-22 PROCEDURE — 87086 URINE CULTURE/COLONY COUNT: CPT

## 2022-09-23 ENCOUNTER — HOSPITAL ENCOUNTER (OUTPATIENT)
Dept: LAB | Facility: MEDICAL CENTER | Age: 72
End: 2022-09-23
Attending: STUDENT IN AN ORGANIZED HEALTH CARE EDUCATION/TRAINING PROGRAM
Payer: MEDICARE

## 2022-09-23 ENCOUNTER — OFFICE VISIT (OUTPATIENT)
Dept: WOUND CARE | Facility: MEDICAL CENTER | Age: 72
End: 2022-09-23
Attending: INTERNAL MEDICINE
Payer: MEDICARE

## 2022-09-23 VITALS
TEMPERATURE: 97.8 F | SYSTOLIC BLOOD PRESSURE: 154 MMHG | DIASTOLIC BLOOD PRESSURE: 87 MMHG | OXYGEN SATURATION: 96 % | RESPIRATION RATE: 18 BRPM | HEART RATE: 47 BPM

## 2022-09-23 DIAGNOSIS — L89.154 SACRAL DECUBITUS ULCER, STAGE IV (HCC): ICD-10-CM

## 2022-09-23 DIAGNOSIS — G82.53 QUADRIPLEGIA, C5-C7, COMPLETE (HCC): ICD-10-CM

## 2022-09-23 DIAGNOSIS — L08.9 WOUND INFECTION: ICD-10-CM

## 2022-09-23 DIAGNOSIS — L89.890 PRESSURE INJURY OF LEFT LEG, UNSTAGEABLE (HCC): ICD-10-CM

## 2022-09-23 DIAGNOSIS — L08.9 WOUND INFECTION: Primary | ICD-10-CM

## 2022-09-23 DIAGNOSIS — T14.8XXA WOUND INFECTION: ICD-10-CM

## 2022-09-23 DIAGNOSIS — T81.31XD POSTOPERATIVE WOUND DEHISCENCE, SUBSEQUENT ENCOUNTER: ICD-10-CM

## 2022-09-23 DIAGNOSIS — T14.8XXA WOUND INFECTION: Primary | ICD-10-CM

## 2022-09-23 LAB
ANION GAP SERPL CALC-SCNC: 8 MMOL/L (ref 7–16)
BUN SERPL-MCNC: 17 MG/DL (ref 8–22)
CALCIUM SERPL-MCNC: 9 MG/DL (ref 8.5–10.5)
CHLORIDE SERPL-SCNC: 106 MMOL/L (ref 96–112)
CO2 SERPL-SCNC: 26 MMOL/L (ref 20–33)
CREAT SERPL-MCNC: 0.91 MG/DL (ref 0.5–1.4)
GFR SERPLBLD CREATININE-BSD FMLA CKD-EPI: 89 ML/MIN/1.73 M 2
GLUCOSE SERPL-MCNC: 93 MG/DL (ref 65–99)
POTASSIUM SERPL-SCNC: 4.3 MMOL/L (ref 3.6–5.5)
SODIUM SERPL-SCNC: 140 MMOL/L (ref 135–145)

## 2022-09-23 PROCEDURE — 80048 BASIC METABOLIC PNL TOTAL CA: CPT

## 2022-09-23 PROCEDURE — 97605 NEG PRS WND THER DME<=50SQCM: CPT

## 2022-09-23 PROCEDURE — 11043 DBRDMT MUSC&/FSCA 1ST 20/<: CPT

## 2022-09-23 PROCEDURE — 36415 COLL VENOUS BLD VENIPUNCTURE: CPT

## 2022-09-23 PROCEDURE — 11043 DBRDMT MUSC&/FSCA 1ST 20/<: CPT | Performed by: STUDENT IN AN ORGANIZED HEALTH CARE EDUCATION/TRAINING PROGRAM

## 2022-09-23 NOTE — PROCEDURES
VAC dressing removed. Wound bed inspected and no residual foam noted. NPWT changed this visit using 1 piece of black foam. Pump set to neg 125mmhg continuous. No leaks noted.

## 2022-09-23 NOTE — PATIENT INSTRUCTIONS
-Keep your wound dressing clean, dry, and intact.    -Change your dressing if it becomes soiled, soaked, or falls off.    -Wound vac may not have any drainage in tube or cannister & it will still be working.   Change cannister if it does become full by pressing tab on side of machine to remove canister and snap on new one. Full canister can be thrown in the trash. If cannister fills with bright red blood - go to ER. Dressing will be changed every MWF at the wound clini.  If you are having issues with your wound VAC, please consider patching leaks, changing the canister, or calling [phone] for troubleshooting. If the wound VAC has been off or un-operational for over 2 hours, call wound care center to inform them and remove all dressings including black foam and replace with normal saline damp gauze.     -Should you experience any significant changes in your wound(s), such as infection (redness, swelling, localized heat, increased pain, fever > 101 F, chills) or have any questions regarding your home care instructions, please contact the wound center at [phone]. If after hours, contact your primary care physician or go to the hospital emergency room.

## 2022-09-23 NOTE — PROGRESS NOTES
Provider Encounter- Pressure Injury        HISTORY OF PRESENT ILLNESS  Wound History:    START OF CARE IN CLINIC: 5/3/2022    REFERRING PROVIDER: Sahara Mendez      WOUNDS- Pressure Injury, Sacrococcygeal, stage IV                                                             Surgical wound dehiscence, left medial lower leg-status post surgical I&D of stage IV pressure injury and           biologic application                       Abrasion to left anterior lower leg-first observed in clinic 7/22/2022          Pressure injury, DTI, left posterior lower leg-first observed in clinic 7/29/2022       HISTORY: Patient with history of incomplete quadriplegia referred to [facility] for treatment of a stage IV pressure injury.  He has a history of previous pressure injuries to this area, and underwent muscle flaps in 2019, and then again in 2020.  He was seen in the wound clinic in November 2021 for an ulcer proximal from his current ulcer, and pressure injuries to his left posterior lower leg and left heel.  At that time, it was discovered that the patient had retained VAC foam embedded in the wound bed of the sacral wound.  Attempts were made to get him back to his plastic surgeon, though unsuccessful.  In January he underwent surgical removal of VAC sponge along with excisional debridement of his sacral wound by Dr. Chaves.  After the surgery, his wound went on to heal without incident.   In early April 2022, his home health nurse noted a new sacral ulcer, below the previous ulcer which quickly tripled in size over the following weeks.  The ulcer to his left medial lower leg had also deteriorated, with bone visible at the base..  He was hospitalized from 4/22 until 4/27/2022 and underwent surgery with Dr. Raman on 4/26 for irrigation and debridement of multiple compartments of the left lower extremity, bone excision, and complex closure of chronic wound using biologic skin substitute.   His sacrococcygeal wound was not  surgically addressed during this admission.  He was discharged back to his group home, with home health, and referral to outpatient wound clinic for his sacral wound.  He was instructed to follow-up with his surgeon for his lower leg wound.       Postoperatively, the left medial lower leg incision dehisced.  He was seen by his surgeon at [facility] on 5/11.  The surgeon opted to leave remaining sutures in place, and refer him to the wound clinic for treatment of this wound.   Treatment of this wound was initiated in clinic on 5/12.  During this visit was also noted that his heel DTI had resolved, but that he had a new pressure injury to his left posterior lower extremity.     A new pressure injury was noted to patient's right upper buttock/lower back on 5/20/2022.  Wound was linear in shape, skin discolored but intact.     Abrasion noted to left anterior lower leg.  First observed in clinic on 7/22/2022.  Patient states he bumped his leg into his food tray.     Small DTI noted to patient's left lateral lower leg on 7/29/2022.  Skin intact but discolored.     Large area of deep tissue injury noted to patient's left exterior lower leg.  Patient denied any trauma to this area.  Skin intact.  Wound documented.    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  Never used smokeless tobacco    Patient's problem list, allergies, and current medications reviewed and updated in Epic    Interval History:  Interval History thinned 7/29/2022.  Please see previous notes for complete interval history.     7/29/2022 : Clinic visit with ADILSON Arthur, FNP-BC, CWDINESHN, CFCN.  Turk states he continues to do  well.  He was able to go to [place] yesterday, spent today [place].  His left lower extremity wound has deteriorated, presents today with significant odor and deterioration of tissue.  VAC held from this wound today after debridement.  I also did not apply biologic today.   Sacral wound about the same in area, though some evidence of increasing granulation.  No evidence of infection.  Small abrasion to his left lower leg appears to be improving.  He has a new small DTI to his left lateral lower leg, skin intact but discolored.    8/5/2022 : Clinic visit with ADILSON Arthur, HOLDEN, IMAN, LILIYA.  Anjum continues to feel well.  His left lower wound has improved with VAC hold, will discontinue from this wound altogether.  I did not apply biologic to this wound today given its current improvement.  DTI to posterior left leg is a bit darker today, skin still intact.  Patient states he has been wearing his heel float boot at all times.  Sacral wound with increased granulation, slight decrease in depth, bone still exposed.    8/12/2022 : Clinic visit with ADILSON Arthur, HOLDEN, IMAN, LILIYA.   Anjum states he is feeling well.  His left lower leg wound continues to improve without the use of VAC or biologic.  The deep tissue injury to his posterior leg is gradually improving, area decreasing.  The abrasion to his left shin is now open, was covered with scab last week.  Sacral wound improving slowly, increase granulation, slight decrease in area.  Home health had messaged that they were concerned for infection due to odor.  I did not appreciate any significant odor today.  We did add Puracyn gel to the wound bed prior to reapplying VAC.  Anjum continues to spend most of his day up in his wheelchair, though tries to reposition frequently, and is wearing heel float boots bilaterally at all times.    8/19/2022 : Clinic visit with ADILSON Arthur, HOLDEN, IMAN, LILIYA.   Anjum states he is in his usual state of health,  feeling well overall.  All of his wounds are progressing, though very slowly.  He continues to spend most of the day up in his wheelchair.  He does know to shift his weight frequently, and has an appropriate pressure relief cushion in his chair.    He was seen by [facility] earlier this week, for complaints of leakage around his suprapubic catheter and fever.  He was prescribed Keflex, and his catheter was changed    8/26/2022: Clinic visit with ADILSON Flores.  Patient reports overall he is feeling well denies any fever or chills.  Patient reports that he is continuously wearing Prevalon boots to offload bilateral lower extremities.  The left medial lower extremity wound is quite boggy with a small amount of turbulent fluid which was expressed with minimal palpation to the periwound area.  There is no foul-smelling odor emanating from the wound bed there is no erythema or edema.    9/2/2022 : Clinic visit with ADILSON Arthur, FNP-BC, CWOCN, CFCN.   States he is feeling well overall, denies fevers, chills, nausea, vomiting, cough or shortness of breath.  Unfortunately, his wounds are not progressing significantly.  Leg wound presents with boggy tissue, and probes to bone.  Area and depth of sacral wound have not changed significantly, bone still exposed.  Previously has been seen by several different plastic surgeons, including Dr. Rodriguez, Dr. Nicolas,  Dr. Mott, Dr. Chaves, he was also referred to Dr. Browne at [facility].  None of these surgeons have agreed to see him thus far.    9/9/2022: Clinic visit with Steve Hodges MD. Patient reports doing ok. Denies any changes to health or symptoms of infection. Wounds are not progressing, leg wound can be probed to bone and tissue is boggy. Wound VAC to sacrum with bone still exposed. He reports previously being treated for OM for 6 weeks without resolution. Discussed possibly getting imaging to eval for OM, however with chronic OM there is limited to no role  for prolonged Abx therapy per IDSA guidelines.    9/16/2022: Clinic visit with Steve Hodges MD. Patient reports feeling his normal state of health, denies any fevers or chills. His medial left leg wound is boggy and I was able to express some tan fluid concerning for purulence. Will take wound culture and order abx as appropriate for the results. Will not start empirical Abx as no evidence of cellulitis and his history of multiple rounds of Abx in the past. Will order CT scan of lower extremity and sacrum to further evaluate areas. Sacrum still to bone, had previous history of some kind of retained material in wound bed, will obtain the CT scan to evaluate.    9/23/2022: Clinic visit with Steve Hodges MD. Patient reports doing ok, denies any fevers or chills. Patient is taking Augmentin, reports tolerating well without any side effects. Tissue quality leg wound remains poor, still with some purulence able to be expressed but less than last week. Patient has CT scan planned for 10/3 and had labs today to ensure adequate renal function. Sacrum measuring smaller, with larger undermining.     REVIEW OF SYSTEMS:   Unchanged from previous clinic visit on 9/23/2022    PHYSICAL EXAMINATION:   BP (!) 154/87 (BP Location: Left arm, Patient Position: Sitting) Comment: RN notified  Pulse (!) 47   Temp 36.6 °C (97.8 °F) (Temporal)   Resp 18   SpO2 96%   Physical Exam  Constitutional:       Appearance: He is obese.   Cardiovascular:      Rate and Rhythm: Bradycardia present.   Pulmonary:      Effort: Pulmonary effort is normal. No respiratory distress.      Breath sounds: No wheezing.   Skin:     Comments: Stage IV coccygeal pressure injury- Wound without significant change, bone still present in wound bed  Full-thickness wound to left medial lower leg- Wound has not improved, tissue remains boggy and small amount of purulence was expressed. No cellulitis.  Left lower extremity DTIs improving    See flow sheet for  details   Neurological:      Mental Status: He is alert and oriented to person, place, and time.       WOUND ASSESSMENT  Wound 04/11/22 Full Thickness Wound Leg Medial Left --Left Medial Lower Leg (Active)   Wound Image    09/23/22 1400   Site Assessment Red;Purple;Boggy 09/23/22 1400   Periwound Assessment Edema;Fragile 09/23/22 1400   Margins Attached edges 09/23/22 1400   Drainage Amount Moderate 09/23/22 1400   Drainage Description Serosanguineous 09/23/22 1400   Treatments Cleansed;Provider debridement;Site care 09/23/22 1400   Wound Cleansing Puracyn Reading 09/23/22 1400   Periwound Protectant Skin Protectant Wipes to Periwound;Barrier Paste 09/23/22 1400   Dressing Cleansing/Solutions Not Applicable 09/23/22 1400   Dressing Options Hydrofiber Silver;Mepilex;Tubigrip 09/23/22 1400   Dressing Changed Changed 08/19/22 1400   Dressing Change/Treatment Frequency Monday, Wednesday, Friday, and As Needed 07/29/22 1424   Non-staged Wound Description Full thickness 09/23/22 1400   Wound Length (cm) 2.7 cm 09/23/22 1400   Wound Width (cm) 1.9 cm 09/23/22 1400   Wound Depth (cm) 1.4 cm 09/23/22 1400   Wound Surface Area (cm^2) 5.13 cm^2 09/23/22 1400   Wound Volume (cm^3) 7.182 cm^3 09/23/22 1400   Post-Procedure Length (cm) 2.8 cm 09/23/22 1400   Post-Procedure Width (cm) 1.9 cm 09/23/22 1400   Post-Procedure Depth (cm) 1.4 cm 09/23/22 1400   Post-Procedure Surface Area (cm^2) 5.32 cm^2 09/23/22 1400   Post-Procedure Volume (cm^3) 7.448 cm^3 09/23/22 1400   Wound Healing % -[id number] 09/23/22 1400   Tunneling (cm) 0 cm 09/23/22 1400   Tunneling Clock Position of Wound 12 07/22/22 1400   Undermining (cm) 0 cm 09/23/22 1400   Undermining of Wound, 1st Location From 2 o'clock;To 4 o'clock 06/03/22 1300   Wound Odor None 09/23/22 1400   Exposed Structures Bone 09/23/22 1400   Number of days: 165       Wound 04/22/22 Pressure Injury Sacrum POA stage 4 (Active)   Wound Image    09/23/22 1400   Site Assessment Red;Other (Comment)  09/23/22 1400   Periwound Assessment Scar tissue 09/23/22 1400   Margins Unattached edges 09/23/22 1400   Drainage Amount Small 09/23/22 1400   Drainage Description Serosanguineous 09/23/22 1400   Treatments Cleansed;Provider debridement;Site care 09/23/22 1400   Wound Cleansing Puracyn [place] 09/23/22 1400   Periwound Protectant Skin Protectant Wipes to Periwound;Benzoin;Paste Ring;Drape 09/23/22 1400   Dressing Cleansing/Solutions Not Applicable 09/23/22 1400   Dressing Options Wound Vac;Mepilex 09/23/22 1400   Dressing Changed Changed 09/23/22 1400   Dressing Change/Treatment Frequency Monday, Wednesday, Friday, and As Needed 07/29/22 1424   WOUND NURSE ONLY - Pressure Injury Stage 4 09/09/22 1400   Wound Length (cm) 0.9 cm 09/23/22 1400   Wound Width (cm) 1.2 cm 09/23/22 1400   Wound Depth (cm) 1.5 cm 09/23/22 1400   Wound Surface Area (cm^2) 1.08 cm^2 09/23/22 1400   Wound Volume (cm^3) 1.62 cm^3 09/23/22 1400   Post-Procedure Length (cm) 0.9 cm 09/23/22 1400   Post-Procedure Width (cm) 1.3 cm 09/23/22 1400   Post-Procedure Depth (cm) 1.5 cm 09/23/22 1400   Post-Procedure Surface Area (cm^2) 1.17 cm^2 09/23/22 1400   Post-Procedure Volume (cm^3) 1.755 cm^3 09/23/22 1400   Wound Healing % 33 09/23/22 1400   Tunneling (cm) 0 cm 09/23/22 1400   Tunneling Clock Position of Wound 12 05/03/22 1600   Undermining (cm) 2.8 cm 09/23/22 1400   Undermining of Wound, 1st Location From 9 o'clock;To 2 o'clock 09/23/22 1400   Undermining (cm) - 2nd location 3 cm 07/15/22 1400   Undermining of Wound, 2nd Location From 9 o'clock;To 11 o'clock 07/15/22 1400   Undermining (cm) - 3rd location 3.2 cm 06/24/22 1000   Undermining of Wound, 3rd Location From 7 o'clock;To 11 o'clock 06/24/22 1000   Wound Odor None 09/23/22 1400   Exposed Structures Bone 09/23/22 1400   Number of days: 154       Wound 07/22/22 Traumatic Leg Anterior Left (Active)   Wound Image   09/23/22 1400   Site Assessment Purple;Red 09/23/22 1400   Periwound  Assessment Edema;Fragile 09/23/22 1400   Margins Attached edges 08/19/22 1400   Drainage Amount None 09/23/22 1400   Drainage Description Serosanguineous 08/19/22 1400   Treatments Cleansed;Site care 09/23/22 1400   Wound Cleansing Foam Cleanser/Washcloth 09/23/22 1400   Periwound Protectant Skin Protectant Wipes to Periwound;Barrier Paste 09/23/22 1400   Dressing Cleansing/Solutions Not Applicable 09/23/22 1400   Dressing Options Mepilex;Tubigrip 09/23/22 1400   Dressing Changed Changed 09/23/22 1400   Dressing Change/Treatment Frequency Monday, Wednesday, Friday, and As Needed 07/29/22 1424   Non-staged Wound Description Full thickness 08/12/22 1300   Wound Length (cm) 0.4 cm 08/19/22 1400   Wound Width (cm) 0.4 cm 08/19/22 1400   Wound Depth (cm) 0.1 cm 08/19/22 1400   Wound Surface Area (cm^2) 0.16 cm^2 08/19/22 1400   Wound Volume (cm^3) 0.016 cm^3 08/19/22 1400   Post-Procedure Length (cm) 0.9 cm 08/12/22 1300   Post-Procedure Width (cm) 0.5 cm 08/12/22 1300   Post-Procedure Depth (cm) 0.1 cm 08/12/22 1300   Post-Procedure Surface Area (cm^2) 0.45 cm^2 08/12/22 1300   Post-Procedure Volume (cm^3) 0.045 cm^3 08/12/22 1300   Tunneling (cm) 0 cm 09/23/22 1400   Undermining (cm) 0 cm 09/23/22 1400   Wound Odor None 09/23/22 1400   Exposed Structures None 09/23/22 1400   Number of days: 63       Wound 07/29/22 Pressure Injury Leg Posterior;Lateral Left (Active)   Wound Image   09/23/22 1400   Site Assessment Purple 09/23/22 1400   Periwound Assessment Dry;Intact 09/23/22 1400   Drainage Amount None 09/23/22 1400   Drainage Description Sanguineous 08/12/22 1300   Treatments Cleansed;Site care 09/23/22 1400   Wound Cleansing Foam Cleanser/Washcloth 09/23/22 1400   Periwound Protectant Skin Protectant Wipes to Periwound;Barrier Paste 09/23/22 1400   Dressing Cleansing/Solutions Not Applicable 09/23/22 1400   Dressing Options Mepilex;Tubigrip 09/23/22 1400   Dressing Changed Changed 09/23/22 1400   Dressing  Change/Treatment Frequency Monday, Wednesday, Friday, and As Needed 07/29/22 1424   WOUND NURSE ONLY - Pressure Injury Stage DTPI 09/23/22 1400   Wound Length (cm) 0.1 cm 08/12/22 1300   Wound Width (cm) 0.1 cm 08/12/22 1300   Wound Depth (cm) 0.1 cm 08/12/22 1300   Wound Surface Area (cm^2) 0.01 cm^2 08/12/22 1300   Wound Volume (cm^3) 0.001 cm^3 08/12/22 1300   Tunneling (cm) 0 cm 09/23/22 1400   Undermining (cm) 0 cm 09/23/22 1400   Wound Odor None 09/23/22 1400   Exposed Structures None 09/23/22 1400   Number of days: 56         PROCEDURE: Excisional debridement of sacrococcygeal wound and left medial lower leg wound.  -2% viscous lidocaine applied topically to wound bed for approximately 5 minutes prior to debridement  -Curette used to debride wound bed.  Excisional debridement was performed to remove devitalized tissue until healthy, bleeding tissue was visualized.  Debridement was carried into the undermined areas of the wound.  Total area debrided approximately 6.49 cm² (including into wound undermining).  Deepest level of debridement was into the muscle / fascial layer.  -Bleeding controlled with manual pressure.    -Wound care completed by wound RN, refer to flowsheet  -Patient tolerated the procedure well, without c/o pain or discomfort.     BIOLOGIC LOG  5/20/2022-first application.  PRODUCT: EpiCord 2 x 3 cm . PRODUCT #[id number]; EXPIRES: 2026-12-01 5/27/2022- second application.  PRODUCT: EpiCordEX 2 x 3 cm . PRODUCT #[id number]; EXPIRES: 2026-09-01    6/3/2022- third application.  PRODUCT: EpiCordEX 2 x 3 cm . PRODUCT #[id number]; EXPIRES: 2026-10-01    6/10/2022- fourth application.  PRODUCT: EpiCordEX 2 x 3 cm . PRODUCT #[id number]; EXPIRES: 2026-09-01 6/17/2022- fifth application.  PRODUCT: EpiCordEX 2 x 3 cm . PRODUCT #[id number]; EXPIRES: 2027-02-01 6/24/2022- sixth application.  PRODUCT: EpiCordEX 2 x 3 cm . PRODUCT #[id number]; EXPIRES:  2027-02-01 07/01/22: Seventh application.  Product: Epicort Dx 2.0 x 3.0 cm reorder number [id number]; product number [id number]; expires 2027-02-01 7/22/2022- eighth application.  PRODUCT: EpiCord 2 x 3 cm, reorder #[id number]. PRODUCT #[id number]; EXPIRES: 2027-02-01      Pertinent Labs and Diagnostics:    Labs:     A1c: No results found for: HBA1C     [facility] results, 7/1/2022 (under media tab)    CRP 13    ESR 31      IMAGING: No results found.    VASCULAR STUDIES: No results found.    LAST  WOUND CULTURE:   Lab Results   Component Value Date/Time    CULTRSULT Light growth mixed enteric ade. 09/16/2022 03:00 PM          ASSESSMENT AND PLAN:     1. Sacral decubitus ulcer, stage IV ([facility])  Comments: Ulcer first noted in early April 2022 as small open area which quickly enlarged.  This ulcer is present distal from previous sacral ulcer which healed after surgery in January.  Patient has history of flap reconstruction x2 to this area.    9/23/2022: Wound measuring smaller but with associated larger undermining. Bone still palpable.  -Visional debridement performed in clinic today to promote granulation tissue formation  -Patient is to return to clinic weekly for assessment and debridement if needed  -Health to continue to change the wound VAC dressing 2 times per week in between clinic visits.  -Plastic surgery is not an option for this patient.  We have not been able to find a surgeon in [facility] willing to perform surgery on him.  -Healing of this wound is further complicated by the patient's multiple comorbidities, exposed bone, and significant pressure from sitting in his wheelchair  -CT scan of pelvis is planned for 10/3    Wound care: NPWT at 125 mmHg to accelerate granulation, change 3 times per week.      2.  Postoperative wound dehiscence, subsequent encounter  Comments: On 4/26 patient underwent irrigation and debridement of multiple compartments of the left lower extremity states for pressure  injury, with bone excision, and complex closure of chronic wound using biologic skin substitute.  During postop visit in surgeons office on 5/11, surgical site was noted to be dehisced.  Patient was referred to wound clinic for management.    9/23/2022: Tissue boggy, less purulence, still probes to bone.  -Excisional debridement of wound in clinic today, medically necessary to promote wound healing.  - Wound culture from last clinic showed mixed enteric ade, started on 14 day course of Augmentin. CT scan scheduled for 10/3.  -Patient to return to clinic weekly for assessment and debridement  -Home health to change dressing 1-2 times per week in between clinic visits   - May consider resuming wound VAC therapy or biologic.    Wound care: Silver Hydrofiber to manage exudate and bioburden, Mepilex, Tubigrip D to manage edema      3.  Pressure injury of left leg, deep tissue injury  Comments: DTI to posterior left lower leg first observed in clinic 7/29/2022.  Patient does not know how it started, had been wearing heel float boot consistently.    9/23/2022: Improving  -Continue to monitor for any deterioration in tissue quality  -Patient is currently wearing Prevalon boots to help prevent pressure injuries to bilateral lower extremities    Wound care: Mepilex, Tubigrip D      4.  Quadriplegia, C5-C7, complete (HCC)  Comments: Complicating factor.  Impaired mobility and sensation  -Patient is still spending 7-8 hours/day up in his wheelchair, though he does have appropriate offloading cushion, and knows to reposition frequently.  Wears heel float boots bilaterally at all times    5. Wound Infection    9/23/2022  - Wound culture from 9/16 grew mixed enteric ade. Started on 14 day course of Augmentin  - Ordered CT scan of left tib/fib to eval for deep soft tissue infection / abscess vs osteomyelitis. Will order and obtain CT scan of sacrum due to history of retained object (drain tube?) and eval for OM. CT scan  scheduled 10/3.      Please note that this note may have been created using voice recognition software. I have worked with technical experts from [facility] to optimize the interface.  I have made every reasonable attempt to correct obvious errors, but there may be errors of grammar and possibly content that I did not discover before finalizing the note.

## 2022-09-25 LAB
BACTERIA UR CULT: NORMAL
SIGNIFICANT IND 70042: NORMAL
SITE SITE: NORMAL
SOURCE SOURCE: NORMAL

## 2022-09-30 ENCOUNTER — OFFICE VISIT (OUTPATIENT)
Dept: WOUND CARE | Facility: MEDICAL CENTER | Age: 72
End: 2022-09-30
Attending: INTERNAL MEDICINE
Payer: MEDICARE

## 2022-09-30 VITALS
RESPIRATION RATE: 20 BRPM | HEART RATE: 50 BPM | TEMPERATURE: 97.3 F | DIASTOLIC BLOOD PRESSURE: 77 MMHG | SYSTOLIC BLOOD PRESSURE: 140 MMHG | OXYGEN SATURATION: 95 %

## 2022-09-30 DIAGNOSIS — T14.8XXA WOUND INFECTION: ICD-10-CM

## 2022-09-30 DIAGNOSIS — L89.890 PRESSURE INJURY OF LEFT LEG, UNSTAGEABLE (HCC): ICD-10-CM

## 2022-09-30 DIAGNOSIS — G82.53 QUADRIPLEGIA, C5-C7, COMPLETE (HCC): ICD-10-CM

## 2022-09-30 DIAGNOSIS — L08.9 WOUND INFECTION: ICD-10-CM

## 2022-09-30 DIAGNOSIS — T81.31XD POSTOPERATIVE WOUND DEHISCENCE, SUBSEQUENT ENCOUNTER: ICD-10-CM

## 2022-09-30 DIAGNOSIS — L89.154 SACRAL DECUBITUS ULCER, STAGE IV (HCC): Primary | ICD-10-CM

## 2022-09-30 PROCEDURE — 97605 NEG PRS WND THER DME<=50SQCM: CPT

## 2022-09-30 PROCEDURE — 11043 DBRDMT MUSC&/FSCA 1ST 20/<: CPT

## 2022-09-30 PROCEDURE — 11042 DBRDMT SUBQ TIS 1ST 20SQCM/<: CPT

## 2022-09-30 PROCEDURE — 11043 DBRDMT MUSC&/FSCA 1ST 20/<: CPT | Performed by: NURSE PRACTITIONER

## 2022-09-30 RX ORDER — AMOXICILLIN AND CLAVULANATE POTASSIUM 875; 125 MG/1; MG/1
1 TABLET, FILM COATED ORAL 2 TIMES DAILY
Qty: 28 TABLET | Refills: 0 | Status: SHIPPED | OUTPATIENT
Start: 2022-09-30 | End: 2022-09-30 | Stop reason: SDUPTHER

## 2022-09-30 RX ORDER — AMOXICILLIN AND CLAVULANATE POTASSIUM 875; 125 MG/1; MG/1
1 TABLET, FILM COATED ORAL 2 TIMES DAILY
Qty: 28 TABLET | Refills: 0 | Status: SHIPPED | OUTPATIENT
Start: 2022-09-30 | End: 2022-10-14

## 2022-09-30 NOTE — PROGRESS NOTES
Provider Encounter- Pressure Injury        HISTORY OF PRESENT ILLNESS  Wound History:    START OF CARE IN CLINIC: 5/3/2022    REFERRING PROVIDER: Sahara Mendez      WOUNDS- Pressure Injury, Sacrococcygeal, stage IV                                                             Surgical wound dehiscence, left medial lower leg-status post surgical I&D of stage IV pressure injury and           biologic application                       Abrasion to left anterior lower leg-first observed in clinic 7/22/2022          Pressure injury, DTI, left posterior lower leg-first observed in clinic 7/29/2022       HISTORY: Patient with history of incomplete quadriplegia referred to [facility] for treatment of a stage IV pressure injury.  He has a history of previous pressure injuries to this area, and underwent muscle flaps in 2019, and then again in 2020.  He was seen in the wound clinic in November 2021 for an ulcer proximal from his current ulcer, and pressure injuries to his left posterior lower leg and left heel.  At that time, it was discovered that the patient had retained VAC foam embedded in the wound bed of the sacral wound.  Attempts were made to get him back to his plastic surgeon, though unsuccessful.  In January he underwent surgical removal of VAC sponge along with excisional debridement of his sacral wound by Dr. Chaves.  After the surgery, his wound went on to heal without incident.   In early April 2022, his home health nurse noted a new sacral ulcer, below the previous ulcer which quickly tripled in size over the following weeks.  The ulcer to his left medial lower leg had also deteriorated, with bone visible at the base..  He was hospitalized from 4/22 until 4/27/2022 and underwent surgery with Dr. Raman on 4/26 for irrigation and debridement of multiple compartments of the left lower extremity, bone excision, and complex closure of chronic wound using biologic skin substitute.   His sacrococcygeal wound was not  surgically addressed during this admission.  He was discharged back to his group home, with home health, and referral to outpatient wound clinic for his sacral wound.  He was instructed to follow-up with his surgeon for his lower leg wound.       Postoperatively, the left medial lower leg incision dehisced.  He was seen by his surgeon at [facility] on 5/11.  The surgeon opted to leave remaining sutures in place, and refer him to the wound clinic for treatment of this wound.   Treatment of this wound was initiated in clinic on 5/12.  During this visit was also noted that his heel DTI had resolved, but that he had a new pressure injury to his left posterior lower extremity.     A new pressure injury was noted to patient's right upper buttock/lower back on 5/20/2022.  Wound was linear in shape, skin discolored but intact.     Abrasion noted to left anterior lower leg.  First observed in clinic on 7/22/2022.  Patient states he bumped his leg into his food tray.     Small DTI noted to patient's left lateral lower leg on 7/29/2022.  Skin intact but discolored.     Large area of deep tissue injury noted to patient's left exterior lower leg.  Patient denied any trauma to this area.  Skin intact.  Wound documented.    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  Never used smokeless tobacco    Patient's problem list, allergies, and current medications reviewed and updated in Epic    Interval History:  Interval History thinned 7/29/2022.  Please see previous notes for complete interval history.     7/29/2022 : Clinic visit with ADILSON Arthur, FNP-BC, CWDINESHN, CFCN.  Turk states he continues to do  well.  He was able to go to [employer] yesterday, spent today [place].  His left lower extremity wound has deteriorated, presents today with significant odor and deterioration of tissue.  VAC held from this wound today after debridement.  I also did not apply biologic today.   Sacral wound about the same in area, though some evidence of increasing granulation.  No evidence of infection.  Small abrasion to his left lower leg appears to be improving.  He has a new small DTI to his left lateral lower leg, skin intact but discolored.    8/5/2022 : Clinic visit with ADILSON Arthur, HOLDEN, IMAN, LILIYA.  Anjum continues to feel well.  His left lower wound has improved with VAC hold, will discontinue from this wound altogether.  I did not apply biologic to this wound today given its current improvement.  DTI to posterior left leg is a bit darker today, skin still intact.  Patient states he has been wearing his heel float boot at all times.  Sacral wound with increased granulation, slight decrease in depth, bone still exposed.    8/12/2022 : Clinic visit with ADILSON Arthur, HOLDEN, IMAN, LILIYA.   Anjum states he is feeling well.  His left lower leg wound continues to improve without the use of VAC or biologic.  The deep tissue injury to his posterior leg is gradually improving, area decreasing.  The abrasion to his left shin is now open, was covered with scab last week.  Sacral wound improving slowly, increase granulation, slight decrease in area.  Home health had messaged that they were concerned for infection due to odor.  I did not appreciate any significant odor today.  We did add Puracyn gel to the wound bed prior to reapplying VAC.  Anjum continues to spend most of his day up in his wheelchair, though tries to reposition frequently, and is wearing heel float boots bilaterally at all times.    8/19/2022 : Clinic visit with ADILSON Arthur, HOLDEN, IMAN, LILIYA.   Anjum states he is in his usual state of health,  feeling well overall.  All of his wounds are progressing, though very slowly.  He continues to spend most of the day up in his wheelchair.  He does know to shift his weight frequently, and has an appropriate pressure relief cushion in his chair.    He was seen by [facility] earlier this week, for complaints of leakage around his suprapubic catheter and fever.  He was prescribed Keflex, and his catheter was changed    8/26/2022: Clinic visit with ADILSON Flores.  Patient reports overall he is feeling well denies any fever or chills.  Patient reports that he is continuously wearing Prevalon boots to offload bilateral lower extremities.  The left medial lower extremity wound is quite boggy with a small amount of turbulent fluid which was expressed with minimal palpation to the periwound area.  There is no foul-smelling odor emanating from the wound bed there is no erythema or edema.    9/2/2022 : Clinic visit with ADILSON Arthur, FNP-BC, CWOCN, CFCN.   States he is feeling well overall, denies fevers, chills, nausea, vomiting, cough or shortness of breath.  Unfortunately, his wounds are not progressing significantly.  Leg wound presents with boggy tissue, and probes to bone.  Area and depth of sacral wound have not changed significantly, bone still exposed.  Previously has been seen by several different plastic surgeons, including Dr. Rodriguez, Dr. Nicolas,  Dr. Mott, Dr. Chaves, he was also referred to Dr. Browne at [facility].  None of these surgeons have agreed to see him thus far.    9/9/2022: Clinic visit with Steve Hodges MD. Patient reports doing ok. Denies any changes to health or symptoms of infection. Wounds are not progressing, leg wound can be probed to bone and tissue is boggy. Wound VAC to sacrum with bone still exposed. He reports previously being treated for OM for 6 weeks without resolution. Discussed possibly getting imaging to eval for OM, however with chronic OM there is limited to no role  for prolonged Abx therapy per IDSA guidelines.    9/16/2022: Clinic visit with Steve Hodges MD. Patient reports feeling his normal state of health, denies any fevers or chills. His medial left leg wound is boggy and I was able to express some tan fluid concerning for purulence. Will take wound culture and order abx as appropriate for the results. Will not start empirical Abx as no evidence of cellulitis and his history of multiple rounds of Abx in the past. Will order CT scan of lower extremity and sacrum to further evaluate areas. Sacrum still to bone, had previous history of some kind of retained material in wound bed, will obtain the CT scan to evaluate.    9/23/2022: Clinic visit with Steve Hodges MD. Patient reports doing ok, denies any fevers or chills. Patient is taking Augmentin, reports tolerating well without any side effects. Tissue quality leg wound remains poor, still with some purulence able to be expressed but less than last week. Patient has CT scan planned for 10/3 and had labs today to ensure adequate renal function. Sacrum measuring smaller, with larger undermining.    9/30/2022 : Clinic visit with Kelly Sandy, APRN, FNP-BC, CWOCN, CFCN.   Turk states that he is feeling well.  He is still taking Augmentin, reports no adverse effects.  Purulent drainage still noted from lower leg wound, with boggy, dusky tissue in the wound bed.  We will extend Augmentin and additional 2 weeks.   Opening of sacral wound has decreased since last assessment, increase granulation within the wound bed noted, however bone still exposed.  His CT is scheduled for Monday 10/3, of both sacrum and his lower leg.     REVIEW OF SYSTEMS:   Unchanged from previous clinic visit on 9/23/2022    PHYSICAL EXAMINATION:   BP (!) 140/77   Pulse (!) 50   Temp 36.3 °C (97.3 °F)   Resp 20   SpO2 95%   Physical Exam  Constitutional:       Appearance: He is obese.   Cardiovascular:      Rate and Rhythm: Bradycardia present.    Pulmonary:      Effort: Pulmonary effort is normal. No respiratory distress.      Breath sounds: No wheezing.   Skin:     Comments: Stage IV coccygeal pressure injury-outer opening has decreased in area, undermining persists, and bone still exposed.  Moderate amount of serosanguineous drainage.  No evidence of infection.  Full-thickness wound to left medial lower leg: Tissue quality still poor, dusky and friable.  Purulent drainage noted with manual pressure applied to periwound.  Left lower extremity DTIs improving    See flow sheet for details   Neurological:      Mental Status: He is alert and oriented to person, place, and time.       WOUND ASSESSMENT  Wound 04/11/22 Full Thickness Wound Leg Medial Left --Left Medial Lower Leg (Active)   Wound Image    09/30/22 1600   Site Assessment Red;Purple;Boggy 09/30/22 1600   Periwound Assessment Edema;Fragile 09/30/22 1600   Margins Attached edges 09/30/22 1600   Drainage Amount Moderate 09/30/22 1600   Drainage Description Serosanguineous 09/30/22 1600   Treatments Cleansed;Topical Lidocaine;Provider debridement;Site care 09/30/22 1600   Wound Cleansing Puracyn Browder 09/30/22 1600   Periwound Protectant Skin Protectant Wipes to Periwound;Barrier Paste 09/30/22 1600   Dressing Cleansing/Solutions Not Applicable 09/30/22 1600   Dressing Options Hydrofiber Silver;Mepilex;Tubigrip 09/30/22 1600   Dressing Changed Changed 08/19/22 1400   Dressing Change/Treatment Frequency Monday, Wednesday, Friday, and As Needed 07/29/22 1424   Non-staged Wound Description Full thickness 09/30/22 1600   Wound Length (cm) 2.6 cm 09/30/22 1600   Wound Width (cm) 1.4 cm 09/30/22 1600   Wound Depth (cm) 1.5 cm 09/30/22 1600   Wound Surface Area (cm^2) 3.64 cm^2 09/30/22 1600   Wound Volume (cm^3) 5.46 cm^3 09/30/22 1600   Post-Procedure Length (cm) 2.6 cm 09/30/22 1600   Post-Procedure Width (cm) 1.5 cm 09/30/22 1600   Post-Procedure Depth (cm) 1.5 cm 09/30/22 1600   Post-Procedure Surface  Area (cm^2) 3.9 cm^2 09/30/22 1600   Post-Procedure Volume (cm^3) 5.85 cm^3 09/30/22 1600   Wound Healing % -[id number] 09/30/22 1600   Tunneling (cm) 0 cm 09/30/22 1600   Tunneling Clock Position of Wound 12 07/22/22 1400   Undermining (cm) 0 cm 09/30/22 1600   Undermining of Wound, 1st Location From 2 o'clock;To 4 o'clock 06/03/22 1300   Wound Odor None 09/30/22 1600   Exposed Structures Bone 09/30/22 1600   Number of days: 172       Wound 04/22/22 Pressure Injury Sacrum POA stage 4 (Active)   Wound Image    09/30/22 1600   Site Assessment Red;Other (Comment) 09/30/22 1600   Periwound Assessment Scar tissue 09/30/22 1600   Margins Unattached edges 09/30/22 1600   Drainage Amount Small 09/30/22 1600   Drainage Description Serosanguineous 09/30/22 1600   Treatments Cleansed;Topical Lidocaine;Provider debridement;Site care 09/30/22 1600   Wound Cleansing Puracyn Newnan 09/30/22 1600   Periwound Protectant Skin Protectant Wipes to Periwound;Benzoin;Paste Ring;Drape 09/30/22 1600   Dressing Cleansing/Solutions Not Applicable 09/30/22 1600   Dressing Options Wound Vac;Mepilex 09/30/22 1600   Dressing Changed Changed 09/30/22 1600   Dressing Change/Treatment Frequency Monday, Wednesday, Friday, and As Needed 07/29/22 1424   WOUND NURSE ONLY - Pressure Injury Stage 4 09/09/22 1400   Wound Length (cm) 1.5 cm 09/30/22 1600   Wound Width (cm) 1.4 cm 09/30/22 1600   Wound Depth (cm) 1.5 cm 09/30/22 1600   Wound Surface Area (cm^2) 2.1 cm^2 09/30/22 1600   Wound Volume (cm^3) 3.15 cm^3 09/30/22 1600   Post-Procedure Length (cm) 1.5 cm 09/30/22 1600   Post-Procedure Width (cm) 1.4 cm 09/30/22 1600   Post-Procedure Depth (cm) 1.4 cm 09/30/22 1600   Post-Procedure Surface Area (cm^2) 2.1 cm^2 09/30/22 1600   Post-Procedure Volume (cm^3) 2.94 cm^3 09/30/22 1600   Wound Healing % -31 09/30/22 1600   Tunneling (cm) 0 cm 09/30/22 1600   Tunneling Clock Position of Wound 12 05/03/22 1600   Undermining (cm) 1.9 cm 09/30/22 1600   Undermining  of Wound, 1st Location From 8 o'clock;From 3 o'clock 09/30/22 1600   Undermining (cm) - 2nd location 3 cm 07/15/22 1400   Undermining of Wound, 2nd Location From 9 o'clock;To 11 o'clock 07/15/22 1400   Undermining (cm) - 3rd location 3.2 cm 06/24/22 1000   Undermining of Wound, 3rd Location From 7 o'clock;To 11 o'clock 06/24/22 1000   Wound Odor None 09/30/22 1600   Exposed Structures Bone 09/30/22 1600   Number of days: 161       Wound 07/22/22 Traumatic Leg Anterior Left (Active)   Wound Image   09/30/22 1600   Site Assessment Purple;Red 09/30/22 1600   Periwound Assessment Edema;Fragile 09/30/22 1600   Margins Attached edges 08/19/22 1400   Drainage Amount None 09/30/22 1600   Drainage Description Serosanguineous 08/19/22 1400   Treatments Cleansed;Site care 09/30/22 1600   Wound Cleansing Foam Cleanser/Washcloth 09/30/22 1600   Periwound Protectant Skin Protectant Wipes to Periwound;Barrier Paste 09/30/22 1600   Dressing Cleansing/Solutions Not Applicable 09/30/22 1600   Dressing Options Mepilex;Tubigrip 09/30/22 1600   Dressing Changed Changed 09/30/22 1600   Dressing Change/Treatment Frequency Monday, Wednesday, Friday, and As Needed 07/29/22 1424   Non-staged Wound Description Full thickness 08/12/22 1300   Wound Length (cm) 0.4 cm 08/19/22 1400   Wound Width (cm) 0.4 cm 08/19/22 1400   Wound Depth (cm) 0.1 cm 08/19/22 1400   Wound Surface Area (cm^2) 0.16 cm^2 08/19/22 1400   Wound Volume (cm^3) 0.016 cm^3 08/19/22 1400   Post-Procedure Length (cm) 0.9 cm 08/12/22 1300   Post-Procedure Width (cm) 0.5 cm 08/12/22 1300   Post-Procedure Depth (cm) 0.1 cm 08/12/22 1300   Post-Procedure Surface Area (cm^2) 0.45 cm^2 08/12/22 1300   Post-Procedure Volume (cm^3) 0.045 cm^3 08/12/22 1300   Tunneling (cm) 0 cm 09/30/22 1600   Undermining (cm) 0 cm 09/30/22 1600   Wound Odor None 09/30/22 1600   Exposed Structures None 09/30/22 1600   Number of days: 70       Wound 07/29/22 Pressure Injury Leg Posterior;Lateral Left  (Active)   Wound Image   09/30/22 1600   Site Assessment Purple 09/30/22 1600   Periwound Assessment Dry;Intact 09/30/22 1600   Drainage Amount None 09/30/22 1600   Drainage Description Sanguineous 08/12/22 1300   Treatments Cleansed;Site care 09/30/22 1600   Wound Cleansing Foam Cleanser/Washcloth 09/30/22 1600   Periwound Protectant Skin Protectant Wipes to Periwound;Barrier Paste 09/30/22 1600   Dressing Cleansing/Solutions Not Applicable 09/30/22 1600   Dressing Options Mepilex;Tubigrip 09/30/22 1600   Dressing Changed Changed 09/30/22 1600   Dressing Change/Treatment Frequency Monday, Wednesday, Friday, and As Needed 07/29/22 1424   WOUND NURSE ONLY - Pressure Injury Stage DTPI 09/30/22 1600   Wound Length (cm) 0.1 cm 08/12/22 1300   Wound Width (cm) 0.1 cm 08/12/22 1300   Wound Depth (cm) 0.1 cm 08/12/22 1300   Wound Surface Area (cm^2) 0.01 cm^2 08/12/22 1300   Wound Volume (cm^3) 0.001 cm^3 08/12/22 1300   Tunneling (cm) 0 cm 09/30/22 1600   Undermining (cm) 0 cm 09/30/22 1600   Wound Odor None 09/30/22 1600   Exposed Structures None 09/30/22 1600   Number of days: 63         PROCEDURE: Excisional debridement of sacrococcygeal wound and left medial lower leg wound.  -2% viscous lidocaine applied topically to wound beds for approximately 5 minutes prior to debridement  -Curette used to debride wound beds.  Excisional debridement was performed to remove devitalized tissue until healthy, bleeding tissue was visualized.  Debridement was carried into the undermined areas of the sacral wound.  Total area debrided approximately 7.5 cm² (including into wound undermining).  Deepest level of debridement was into the muscle / fascial layer.  -Bleeding controlled with manual pressure.    -Wound care completed by wound RN, refer to flowsheet  -Patient tolerated the procedure well, without c/o pain or discomfort.     BIOLOGIC LOG  5/20/2022-first application.  PRODUCT: EpiCord 2 x 3 cm . PRODUCT #[id number];  EXPIRES: 2026-12-01 5/27/2022- second application.  PRODUCT: EpiCordEX 2 x 3 cm . PRODUCT #[id number]; EXPIRES: 2026-09-01    6/3/2022- third application.  PRODUCT: EpiCordEX 2 x 3 cm . PRODUCT #[id number]; EXPIRES: 2026-10-01    6/10/2022- fourth application.  PRODUCT: EpiCordEX 2 x 3 cm . PRODUCT #[id number]; EXPIRES: 2026-09-01 6/17/2022- fifth application.  PRODUCT: EpiCordEX 2 x 3 cm . PRODUCT #[id number]; EXPIRES: 2027-02-01 6/24/2022- sixth application.  PRODUCT: EpiCordEX 2 x 3 cm . PRODUCT #[id number]; EXPIRES: 2027-02-01 07/01/22: Seventh application.  Product: Epicort Dx 2.0 x 3.0 cm reorder number [id number]; product number [id number]; expires 2027-02-01 7/22/2022- eighth application.  PRODUCT: EpiCord 2 x 3 cm, reorder #[id number]. PRODUCT #[id number]; EXPIRES: 2027-02-01      Pertinent Labs and Diagnostics:    Labs:     A1c: No results found for: HBA1C     [facility] results, 7/1/2022 (under media tab)    CRP 13    ESR 31      IMAGING: No results found.    VASCULAR STUDIES: No results found.    LAST  WOUND CULTURE:   Lab Results   Component Value Date/Time    CULTRSULT No growth at 48 hours. 09/22/2022 01:00 PM          ASSESSMENT AND PLAN:     1. Sacral decubitus ulcer, stage IV ([facility])  Comments: Ulcer first noted in early April 2022 as small open area which quickly enlarged.  This ulcer is present distal from previous sacral ulcer which healed after surgery in January.  Patient has history of flap reconstruction x2 to this area.    9/30/2022: Outer dimensions of wound measure smaller today.  However, undermining persists, and bone still exposed.  Granulation tissue noted within wound bed.  -Excisional debridement of wound in clinic today, medically necessary to promote wound healing  -Patient is to return to clinic weekly for assessment and debridement   -Health to continue to change the wound VAC dressing 2 times per week in between clinic  visits.  -Plastic surgery is not an option for this patient.  We have not been able to find a surgeon in [facility] willing to perform surgery on him.  -Healing of this wound is further complicated by the patient's multiple comorbidities, exposed bone, and significant pressure from sitting in his wheelchair  -CT of pelvis and lower extremity are scheduled for 10/3    Wound care: NPWT at 125 mmHg to accelerate granulation, change 3 times per week.      2.  Postoperative wound dehiscence, subsequent encounter  Comments: On 4/26 patient underwent irrigation and debridement of multiple compartments of the left lower extremity states for pressure injury, with bone excision, and complex closure of chronic wound using biologic skin substitute.  During postop visit in surgeons office on 5/11, surgical site was noted to be dehisced.  Patient was referred to wound clinic for management.    9/30/2022: Quality of tissue wound bed remains poor, friable and with dusky appearance.  Purulent drainage noted with manual pressure applied to periwound  -Excisional debridement of wound in clinic today, medically necessary to promote wound healing.  -Patient is still on Augmentin, tolerating well.  We will extend an additional 2 weeks.  -Patient to return to clinic weekly for assessment and debridement  -Home health to change dressing 1-2 times per week in between clinic visits   -CT scan scheduled for 10/3  - May consider resuming wound VAC therapy or biologic.    Wound care: Silver Hydrofiber to manage exudate and bioburden, Mepilex, Tubigrip D to manage edema      3.  Pressure injury of left leg, deep tissue injury  Comments: DTI to posterior left lower leg first observed in clinic 7/29/2022.  Patient does not know how it started, had been wearing heel float boot consistently.    9/30/2022: Remains stable.  Skin still intact  -Continue to monitor for any deterioration in tissue quality  -Patient is currently wearing Prevalon boots to help  prevent pressure injuries to bilateral lower extremities    Wound care: Mepilex, Tubigrip D      4.  Quadriplegia, C5-C7, complete ([facility])  Comments: Complicating factor.  Impaired mobility and sensation  -Patient is still spending 7-8 hours/day up in his wheelchair, though he does have appropriate offloading cushion, and knows to reposition frequently.  Wears heel float boots bilaterally at all times    5. Wound Infection    9/30/2022: Patient is taking Augmentin, tolerating without any difficulty.  -Augmentin reordered x14 more days  -CT scan 10/3, see above      Please note that this note may have been created using voice recognition software. I have worked with technical experts from [facility] [employer] to optimize the interface.  I have made every reasonable attempt to correct obvious errors, but there may be errors of grammar and possibly content that I did not discover before finalizing the note.

## 2022-09-30 NOTE — PROGRESS NOTES
Provider Encounter- Pressure Injury        HISTORY OF PRESENT ILLNESS  Wound History:    START OF CARE IN CLINIC: 5/3/2022    REFERRING PROVIDER: Sahara Mendez      WOUNDS- Pressure Injury, Sacrococcygeal, stage IV                                                             Surgical wound dehiscence, left medial lower leg-status post surgical I&D of stage IV pressure injury and           biologic application                       Abrasion to left anterior lower leg-first observed in clinic 7/22/2022          Pressure injury, DTI, left posterior lower leg-first observed in clinic 7/29/2022       HISTORY: Patient with history of incomplete quadriplegia referred to [facility] for treatment of a stage IV pressure injury.  He has a history of previous pressure injuries to this area, and underwent muscle flaps in 2019, and then again in 2020.  He was seen in the wound clinic in November 2021 for an ulcer proximal from his current ulcer, and pressure injuries to his left posterior lower leg and left heel.  At that time, it was discovered that the patient had retained VAC foam embedded in the wound bed of the sacral wound.  Attempts were made to get him back to his plastic surgeon, though unsuccessful.  In January he underwent surgical removal of VAC sponge along with excisional debridement of his sacral wound by Dr. Chaves.  After the surgery, his wound went on to heal without incident.   In early April 2022, his home health nurse noted a new sacral ulcer, below the previous ulcer which quickly tripled in size over the following weeks.  The ulcer to his left medial lower leg had also deteriorated, with bone visible at the base..  He was hospitalized from 4/22 until 4/27/2022 and underwent surgery with Dr. Raman on 4/26 for irrigation and debridement of multiple compartments of the left lower extremity, bone excision, and complex closure of chronic wound using biologic skin substitute.   His sacrococcygeal wound was not  surgically addressed during this admission.  He was discharged back to his group home, with home health, and referral to outpatient wound clinic for his sacral wound.  He was instructed to follow-up with his surgeon for his lower leg wound.       Postoperatively, the left medial lower leg incision dehisced.  He was seen by his surgeon at [facility] on 5/11.  The surgeon opted to leave remaining sutures in place, and refer him to the wound clinic for treatment of this wound.   Treatment of this wound was initiated in clinic on 5/12.  During this visit was also noted that his heel DTI had resolved, but that he had a new pressure injury to his left posterior lower extremity.     A new pressure injury was noted to patient's right upper buttock/lower back on 5/20/2022.  Wound was linear in shape, skin discolored but intact.     Abrasion noted to left anterior lower leg.  First observed in clinic on 7/22/2022.  Patient states he bumped his leg into his food tray.     Small DTI noted to patient's left lateral lower leg on 7/29/2022.  Skin intact but discolored.     Large area of deep tissue injury noted to patient's left exterior lower leg.  Patient denied any trauma to this area.  Skin intact.  Wound documented.    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  Never used smokeless tobacco    Patient's problem list, allergies, and current medications reviewed and updated in Epic    Interval History:  Interval History thinned 7/29/2022.  Please see previous notes for complete interval history.     7/29/2022 : Clinic visit with ADILSON Arthur, FNP-BC, CWDINESHN, CFCN.  Turk states he continues to do  well.  He was able to go to [place] yesterday, spent today [place].  His left lower extremity wound has deteriorated, presents today with significant odor and deterioration of tissue.  VAC held from this wound today after debridement.  I also did not apply biologic today.   Sacral wound about the same in area, though some evidence of increasing granulation.  No evidence of infection.  Small abrasion to his left lower leg appears to be improving.  He has a new small DTI to his left lateral lower leg, skin intact but discolored.    8/5/2022 : Clinic visit with ADILSON Arthur, HOLDEN, IMAN, LILIYA.  Anjum continues to feel well.  His left lower wound has improved with VAC hold, will discontinue from this wound altogether.  I did not apply biologic to this wound today given its current improvement.  DTI to posterior left leg is a bit darker today, skin still intact.  Patient states he has been wearing his heel float boot at all times.  Sacral wound with increased granulation, slight decrease in depth, bone still exposed.    8/12/2022 : Clinic visit with ADILSON Arthur, HOLDEN, IMAN, LILIYA.   Anjum states he is feeling well.  His left lower leg wound continues to improve without the use of VAC or biologic.  The deep tissue injury to his posterior leg is gradually improving, area decreasing.  The abrasion to his left shin is now open, was covered with scab last week.  Sacral wound improving slowly, increase granulation, slight decrease in area.  Home health had messaged that they were concerned for infection due to odor.  I did not appreciate any significant odor today.  We did add Puracyn gel to the wound bed prior to reapplying VAC.  Anjum continues to spend most of his day up in his wheelchair, though tries to reposition frequently, and is wearing heel float boots bilaterally at all times.    8/19/2022 : Clinic visit with ADILSON Arthur, HOLDEN, IMAN, LILIYA.   Anjum states he is in his usual state of health,  feeling well overall.  All of his wounds are progressing, though very slowly.  He continues to spend most of the day up in his wheelchair.  He does know to shift his weight frequently, and has an appropriate pressure relief cushion in his chair.    He was seen by [facility] earlier this week, for complaints of leakage around his suprapubic catheter and fever.  He was prescribed Keflex, and his catheter was changed    8/26/2022: Clinic visit with ADILSON Flores.  Patient reports overall he is feeling well denies any fever or chills.  Patient reports that he is continuously wearing Prevalon boots to offload bilateral lower extremities.  The left medial lower extremity wound is quite boggy with a small amount of turbulent fluid which was expressed with minimal palpation to the periwound area.  There is no foul-smelling odor emanating from the wound bed there is no erythema or edema.    9/2/2022 : Clinic visit with ADILSON Arthur, FNP-BC, CWOCN, CFCN.   States he is feeling well overall, denies fevers, chills, nausea, vomiting, cough or shortness of breath.  Unfortunately, his wounds are not progressing significantly.  Leg wound presents with boggy tissue, and probes to bone.  Area and depth of sacral wound have not changed significantly, bone still exposed.  Previously has been seen by several different plastic surgeons, including Dr. Rodriguez, Dr. Nicolas,  Dr. Mott, Dr. Chaves, he was also referred to Dr. Browne at [facility].  None of these surgeons have agreed to see him thus far.    9/9/2022: Clinic visit with Steve Hodges MD. Patient reports doing ok. Denies any changes to health or symptoms of infection. Wounds are not progressing, leg wound can be probed to bone and tissue is boggy. Wound VAC to sacrum with bone still exposed. He reports previously being treated for OM for 6 weeks without resolution. Discussed possibly getting imaging to eval for OM, however with chronic OM there is limited to no role  for prolonged Abx therapy per IDSA guidelines.    9/16/2022: Clinic visit with Steve Hodges MD. Patient reports feeling his normal state of health, denies any fevers or chills. His medial left leg wound is boggy and I was able to express some tan fluid concerning for purulence. Will take wound culture and order abx as appropriate for the results. Will not start empirical Abx as no evidence of cellulitis and his history of multiple rounds of Abx in the past. Will order CT scan of lower extremity and sacrum to further evaluate areas. Sacrum still to bone, had previous history of some kind of retained material in wound bed, will obtain the CT scan to evaluate.    9/23/2022: Clinic visit with Steve Hodges MD. Patient reports doing ok, denies any fevers or chills. Patient is taking Augmentin, reports tolerating well without any side effects. Tissue quality leg wound remains poor, still with some purulence able to be expressed but less than last week. Patient has CT scan planned for 10/3 and had labs today to ensure adequate renal function. Sacrum measuring smaller, with larger undermining.     REVIEW OF SYSTEMS:   Unchanged from previous clinic visit on 9/23/2022    PHYSICAL EXAMINATION:   BP (!) 140/77   Pulse (!) 50   Temp 36.3 °C (97.3 °F)   Resp 20   SpO2 95%   Physical Exam  Constitutional:       Appearance: He is obese.   Cardiovascular:      Rate and Rhythm: Bradycardia present.   Pulmonary:      Effort: Pulmonary effort is normal. No respiratory distress.      Breath sounds: No wheezing.   Skin:     Comments: Stage IV coccygeal pressure injury- Wound without significant change, bone still present in wound bed  Full-thickness wound to left medial lower leg- Wound has not improved, tissue remains boggy and small amount of purulence was expressed. No cellulitis.  Left lower extremity DTIs improving    See flow sheet for details   Neurological:      Mental Status: He is alert and oriented to person, place,  and time.       WOUND ASSESSMENT  Wound 04/11/22 Full Thickness Wound Leg Medial Left --Left Medial Lower Leg (Active)   Wound Image    09/23/22 1400   Site Assessment Red;Purple;Boggy 09/23/22 1400   Periwound Assessment Edema;Fragile 09/23/22 1400   Margins Attached edges 09/23/22 1400   Drainage Amount Moderate 09/23/22 1400   Drainage Description Serosanguineous 09/23/22 1400   Treatments Cleansed;Provider debridement;Site care 09/23/22 1400   Wound Cleansing Puracyn [place] 09/23/22 1400   Periwound Protectant Skin Protectant Wipes to Periwound;Barrier Paste 09/23/22 1400   Dressing Cleansing/Solutions Not Applicable 09/23/22 1400   Dressing Options Hydrofiber Silver;Mepilex;Tubigrip 09/23/22 1400   Dressing Changed Changed 08/19/22 1400   Dressing Change/Treatment Frequency Monday, Wednesday, Friday, and As Needed 07/29/22 1424   Non-staged Wound Description Full thickness 09/23/22 1400   Wound Length (cm) 2.7 cm 09/23/22 1400   Wound Width (cm) 1.9 cm 09/23/22 1400   Wound Depth (cm) 1.4 cm 09/23/22 1400   Wound Surface Area (cm^2) 5.13 cm^2 09/23/22 1400   Wound Volume (cm^3) 7.182 cm^3 09/23/22 1400   Post-Procedure Length (cm) 2.8 cm 09/23/22 1400   Post-Procedure Width (cm) 1.9 cm 09/23/22 1400   Post-Procedure Depth (cm) 1.4 cm 09/23/22 1400   Post-Procedure Surface Area (cm^2) 5.32 cm^2 09/23/22 1400   Post-Procedure Volume (cm^3) 7.448 cm^3 09/23/22 1400   Wound Healing % -20835 09/23/22 1400   Tunneling (cm) 0 cm 09/23/22 1400   Tunneling Clock Position of Wound 12 07/22/22 1400   Undermining (cm) 0 cm 09/23/22 1400   Undermining of Wound, 1st Location From 2 o'clock;To 4 o'clock 06/03/22 1300   Wound Odor None 09/23/22 1400   Exposed Structures Bone 09/23/22 1400   Number of days: 172       Wound 04/22/22 Pressure Injury Sacrum POA stage 4 (Active)   Wound Image    09/23/22 1400   Site Assessment Red;Other (Comment) 09/23/22 1400   Periwound Assessment Scar tissue 09/23/22 1400   Margins Unattached  edges 09/23/22 1400   Drainage Amount Small 09/23/22 1400   Drainage Description Serosanguineous 09/23/22 1400   Treatments Cleansed;Provider debridement;Site care 09/23/22 1400   Wound Cleansing Puracyn [place] 09/23/22 1400   Periwound Protectant Skin Protectant Wipes to Periwound;Benzoin;Paste Ring;Drape 09/23/22 1400   Dressing Cleansing/Solutions Not Applicable 09/23/22 1400   Dressing Options Wound Vac;Mepilex 09/23/22 1400   Dressing Changed Changed 09/23/22 1400   Dressing Change/Treatment Frequency Monday, Wednesday, Friday, and As Needed 07/29/22 1424   WOUND NURSE ONLY - Pressure Injury Stage 4 09/09/22 1400   Wound Length (cm) 0.9 cm 09/23/22 1400   Wound Width (cm) 1.2 cm 09/23/22 1400   Wound Depth (cm) 1.5 cm 09/23/22 1400   Wound Surface Area (cm^2) 1.08 cm^2 09/23/22 1400   Wound Volume (cm^3) 1.62 cm^3 09/23/22 1400   Post-Procedure Length (cm) 0.9 cm 09/23/22 1400   Post-Procedure Width (cm) 1.3 cm 09/23/22 1400   Post-Procedure Depth (cm) 1.5 cm 09/23/22 1400   Post-Procedure Surface Area (cm^2) 1.17 cm^2 09/23/22 1400   Post-Procedure Volume (cm^3) 1.755 cm^3 09/23/22 1400   Wound Healing % 33 09/23/22 1400   Tunneling (cm) 0 cm 09/23/22 1400   Tunneling Clock Position of Wound 12 05/03/22 1600   Undermining (cm) 2.8 cm 09/23/22 1400   Undermining of Wound, 1st Location From 9 o'clock;To 2 o'clock 09/23/22 1400   Undermining (cm) - 2nd location 3 cm 07/15/22 1400   Undermining of Wound, 2nd Location From 9 o'clock;To 11 o'clock 07/15/22 1400   Undermining (cm) - 3rd location 3.2 cm 06/24/22 1000   Undermining of Wound, 3rd Location From 7 o'clock;To 11 o'clock 06/24/22 1000   Wound Odor None 09/23/22 1400   Exposed Structures Bone 09/23/22 1400   Number of days: 161       Wound 07/22/22 Traumatic Leg Anterior Left (Active)   Wound Image   09/23/22 1400   Site Assessment Purple;Red 09/23/22 1400   Periwound Assessment Edema;Fragile 09/23/22 1400   Margins Attached edges 08/19/22 1400   Drainage  Amount None 09/23/22 1400   Drainage Description Serosanguineous 08/19/22 1400   Treatments Cleansed;Site care 09/23/22 1400   Wound Cleansing Foam Cleanser/Washcloth 09/23/22 1400   Periwound Protectant Skin Protectant Wipes to Periwound;Barrier Paste 09/23/22 1400   Dressing Cleansing/Solutions Not Applicable 09/23/22 1400   Dressing Options Mepilex;Tubigrip 09/23/22 1400   Dressing Changed Changed 09/23/22 1400   Dressing Change/Treatment Frequency Monday, Wednesday, Friday, and As Needed 07/29/22 1424   Non-staged Wound Description Full thickness 08/12/22 1300   Wound Length (cm) 0.4 cm 08/19/22 1400   Wound Width (cm) 0.4 cm 08/19/22 1400   Wound Depth (cm) 0.1 cm 08/19/22 1400   Wound Surface Area (cm^2) 0.16 cm^2 08/19/22 1400   Wound Volume (cm^3) 0.016 cm^3 08/19/22 1400   Post-Procedure Length (cm) 0.9 cm 08/12/22 1300   Post-Procedure Width (cm) 0.5 cm 08/12/22 1300   Post-Procedure Depth (cm) 0.1 cm 08/12/22 1300   Post-Procedure Surface Area (cm^2) 0.45 cm^2 08/12/22 1300   Post-Procedure Volume (cm^3) 0.045 cm^3 08/12/22 1300   Tunneling (cm) 0 cm 09/23/22 1400   Undermining (cm) 0 cm 09/23/22 1400   Wound Odor None 09/23/22 1400   Exposed Structures None 09/23/22 1400   Number of days: 70       Wound 07/29/22 Pressure Injury Leg Posterior;Lateral Left (Active)   Wound Image   09/23/22 1400   Site Assessment Purple 09/23/22 1400   Periwound Assessment Dry;Intact 09/23/22 1400   Drainage Amount None 09/23/22 1400   Drainage Description Sanguineous 08/12/22 1300   Treatments Cleansed;Site care 09/23/22 1400   Wound Cleansing Foam Cleanser/Washcloth 09/23/22 1400   Periwound Protectant Skin Protectant Wipes to Periwound;Barrier Paste 09/23/22 1400   Dressing Cleansing/Solutions Not Applicable 09/23/22 1400   Dressing Options Mepilex;Tubigrip 09/23/22 1400   Dressing Changed Changed 09/23/22 1400   Dressing Change/Treatment Frequency Monday, Wednesday, Friday, and As Needed 07/29/22 1897   WOUND NURSE ONLY  - Pressure Injury Stage DTPI 09/23/22 1400   Wound Length (cm) 0.1 cm 08/12/22 1300   Wound Width (cm) 0.1 cm 08/12/22 1300   Wound Depth (cm) 0.1 cm 08/12/22 1300   Wound Surface Area (cm^2) 0.01 cm^2 08/12/22 1300   Wound Volume (cm^3) 0.001 cm^3 08/12/22 1300   Tunneling (cm) 0 cm 09/23/22 1400   Undermining (cm) 0 cm 09/23/22 1400   Wound Odor None 09/23/22 1400   Exposed Structures None 09/23/22 1400   Number of days: 63         PROCEDURE: Excisional debridement of sacrococcygeal wound and left medial lower leg wound.  -2% viscous lidocaine applied topically to wound bed for approximately 5 minutes prior to debridement  -Curette used to debride wound bed.  Excisional debridement was performed to remove devitalized tissue until healthy, bleeding tissue was visualized.  Debridement was carried into the undermined areas of the wound.  Total area debrided approximately 6.49 cm² (including into wound undermining).  Deepest level of debridement was into the muscle / fascial layer.  -Bleeding controlled with manual pressure.    -Wound care completed by wound RN, refer to flowsheet  -Patient tolerated the procedure well, without c/o pain or discomfort.     BIOLOGIC LOG  5/20/2022-first application.  PRODUCT: EpiCord 2 x 3 cm . PRODUCT #[id number]; EXPIRES: 2026-12-01 5/27/2022- second application.  PRODUCT: EpiCordEX 2 x 3 cm . PRODUCT #[id number]; EXPIRES: 2026-09-01    6/3/2022- third application.  PRODUCT: EpiCordEX 2 x 3 cm . PRODUCT #[id number]; EXPIRES: 2026-10-01    6/10/2022- fourth application.  PRODUCT: EpiCordEX 2 x 3 cm . PRODUCT #[id number]; EXPIRES: 2026-09-01 6/17/2022- fifth application.  PRODUCT: EpiCordEX 2 x 3 cm . PRODUCT #[id number]; EXPIRES: 2027-02-01 6/24/2022- sixth application.  PRODUCT: EpiCordEX 2 x 3 cm . PRODUCT #[id number]; EXPIRES: 2027--01 07/01/22: Seventh application.  Product: Epicort Dx 2.0 x 3.0 cm reorder number [id number];  product number [id number]; expires 2027-02-01 7/22/2022- eighth application.  PRODUCT: EpiCord 2 x 3 cm, reorder #[id number]. PRODUCT #[id number]; EXPIRES: 2027-02-01      Pertinent Labs and Diagnostics:    Labs:     A1c: No results found for: HBA1C     [facility] results, 7/1/2022 (under media tab)    CRP 13    ESR 31      IMAGING: No results found.    VASCULAR STUDIES: No results found.    LAST  WOUND CULTURE:   Lab Results   Component Value Date/Time    CULTRSULT No growth at 48 hours. 09/22/2022 01:00 PM          ASSESSMENT AND PLAN:     1. Sacral decubitus ulcer, stage IV ([facility])  Comments: Ulcer first noted in early April 2022 as small open area which quickly enlarged.  This ulcer is present distal from previous sacral ulcer which healed after surgery in January.  Patient has history of flap reconstruction x2 to this area.    9/23/2022: Wound measuring smaller but with associated larger undermining. Bone still palpable.  -Visional debridement performed in clinic today to promote granulation tissue formation  -Patient is to return to clinic weekly for assessment and debridement if needed  -Health to continue to change the wound VAC dressing 2 times per week in between clinic visits.  -Plastic surgery is not an option for this patient.  We have not been able to find a surgeon in [facility] willing to perform surgery on him.  -Healing of this wound is further complicated by the patient's multiple comorbidities, exposed bone, and significant pressure from sitting in his wheelchair  -CT scan of pelvis is planned for 10/3    Wound care: NPWT at 125 mmHg to accelerate granulation, change 3 times per week.      2.  Postoperative wound dehiscence, subsequent encounter  Comments: On 4/26 patient underwent irrigation and debridement of multiple compartments of the left lower extremity states for pressure injury, with bone excision, and complex closure of chronic wound using biologic skin substitute.  During postop  visit in surgeons office on 5/11, surgical site was noted to be dehisced.  Patient was referred to wound clinic for management.    9/23/2022: Tissue boggy, less purulence, still probes to bone.  -Excisional debridement of wound in clinic today, medically necessary to promote wound healing.  - Wound culture from last clinic showed mixed enteric ade, started on 14 day course of Augmentin. CT scan scheduled for 10/3.  -Patient to return to clinic weekly for assessment and debridement  -Home health to change dressing 1-2 times per week in between clinic visits   - May consider resuming wound VAC therapy or biologic.    Wound care: Silver Hydrofiber to manage exudate and bioburden, Mepilex, Tubigrip D to manage edema      3.  Pressure injury of left leg, deep tissue injury  Comments: DTI to posterior left lower leg first observed in clinic 7/29/2022.  Patient does not know how it started, had been wearing heel float boot consistently.    9/23/2022: Improving  -Continue to monitor for any deterioration in tissue quality  -Patient is currently wearing Prevalon boots to help prevent pressure injuries to bilateral lower extremities    Wound care: Mepilex, Tubigrip D      4.  Quadriplegia, C5-C7, complete (HCC)  Comments: Complicating factor.  Impaired mobility and sensation  -Patient is still spending 7-8 hours/day up in his wheelchair, though he does have appropriate offloading cushion, and knows to reposition frequently.  Wears heel float boots bilaterally at all times    5. Wound Infection    9/23/2022  - Wound culture from 9/16 grew mixed enteric ade. Started on 14 day course of Augmentin  - Ordered CT scan of left tib/fib to eval for deep soft tissue infection / abscess vs osteomyelitis. Will order and obtain CT scan of sacrum due to history of retained object (drain tube?) and eval for OM. CT scan scheduled 10/3.      Please note that this note may have been created using voice recognition software. I have worked  with technical experts from [facility] to optimize the interface.  I have made every reasonable attempt to correct obvious errors, but there may be errors of grammar and possibly content that I did not discover before finalizing the note.

## 2022-09-30 NOTE — PATIENT INSTRUCTIONS
-Keep your wound dressing clean, dry, and intact.    -Change your dressing if it becomes soiled, soaked, or falls off.    -Wound vac may not have any drainage in tube or cannister & it will still be working.   Change cannister if it does become full by pressing tab on side of machine to remove canister and snap on new one. Full canister can be thrown in the trash. If cannister fills with bright red blood - go to ER. Dressing will be changed every MWF at the wound clini.  If you are having issues with your wound VAC, please consider patching leaks, changing the canister, or calling [phone] for troubleshooting. If the wound VAC has been off or un-operational for over 2 hours, call wound care center to inform them and remove all dressings including black foam and replace with normal saline damp gauze.     -Should you experience any significant changes in your wound(s), such as infection (redness, swelling, localized heat, increased pain, fever > 101 F, chills) or have any questions regarding your home care instructions, please contact the wound center at [phone]. If after hours, contact your primary care physician or go to the hospital emergency room.

## 2022-10-03 ENCOUNTER — APPOINTMENT (OUTPATIENT)
Dept: RADIOLOGY | Facility: MEDICAL CENTER | Age: 72
End: 2022-10-03
Attending: STUDENT IN AN ORGANIZED HEALTH CARE EDUCATION/TRAINING PROGRAM
Payer: MEDICARE

## 2022-10-03 DIAGNOSIS — T14.8XXA WOUND INFECTION: ICD-10-CM

## 2022-10-03 DIAGNOSIS — T81.31XD POSTOPERATIVE WOUND DEHISCENCE, SUBSEQUENT ENCOUNTER: ICD-10-CM

## 2022-10-03 DIAGNOSIS — L89.154 SACRAL DECUBITUS ULCER, STAGE IV (HCC): ICD-10-CM

## 2022-10-03 DIAGNOSIS — L08.9 WOUND INFECTION: ICD-10-CM

## 2022-10-03 PROCEDURE — 700117 HCHG RX CONTRAST REV CODE 255: Performed by: STUDENT IN AN ORGANIZED HEALTH CARE EDUCATION/TRAINING PROGRAM

## 2022-10-03 PROCEDURE — 73701 CT LOWER EXTREMITY W/DYE: CPT | Mod: LT

## 2022-10-03 PROCEDURE — 72193 CT PELVIS W/DYE: CPT

## 2022-10-03 RX ADMIN — IOHEXOL 100 ML: 350 INJECTION, SOLUTION INTRAVENOUS at 14:23

## 2022-10-06 ENCOUNTER — TELEPHONE (OUTPATIENT)
Dept: WOUND CARE | Facility: MEDICAL CENTER | Age: 72
End: 2022-10-06
Payer: MEDICARE

## 2022-10-06 NOTE — TELEPHONE ENCOUNTER
Complex Case Rounds- Discussed non healing sacral and left medial leg wounds. CT scans completed of both areas. Has undergone antibiotic course for Sacral osteomyelitis, at this time an active infection is not present. Plastics has declined patient for sacral flap. Possible referral to Dr Saini. Anjum will need to be willing to stay off of site for extended period of time if this plan is pursued. Providers discussed opening Left medial leg wound and possible wound vac application to promote healing.

## 2022-10-07 ENCOUNTER — OFFICE VISIT (OUTPATIENT)
Dept: WOUND CARE | Facility: MEDICAL CENTER | Age: 72
End: 2022-10-07
Attending: INTERNAL MEDICINE
Payer: MEDICARE

## 2022-10-07 VITALS
RESPIRATION RATE: 20 BRPM | OXYGEN SATURATION: 100 % | SYSTOLIC BLOOD PRESSURE: 151 MMHG | TEMPERATURE: 68 F | DIASTOLIC BLOOD PRESSURE: 81 MMHG | HEART RATE: 52 BPM

## 2022-10-07 DIAGNOSIS — G82.53 QUADRIPLEGIA, C5-C7, COMPLETE (HCC): ICD-10-CM

## 2022-10-07 DIAGNOSIS — L89.154 SACRAL DECUBITUS ULCER, STAGE IV (HCC): ICD-10-CM

## 2022-10-07 DIAGNOSIS — T81.31XD POSTOPERATIVE WOUND DEHISCENCE, SUBSEQUENT ENCOUNTER: ICD-10-CM

## 2022-10-07 DIAGNOSIS — L08.9 WOUND INFECTION: ICD-10-CM

## 2022-10-07 DIAGNOSIS — L89.890 PRESSURE INJURY OF LEFT LEG, UNSTAGEABLE (HCC): ICD-10-CM

## 2022-10-07 DIAGNOSIS — T14.8XXA WOUND INFECTION: ICD-10-CM

## 2022-10-07 PROCEDURE — 99213 OFFICE O/P EST LOW 20 MIN: CPT | Mod: 25 | Performed by: NURSE PRACTITIONER

## 2022-10-07 PROCEDURE — 11043 DBRDMT MUSC&/FSCA 1ST 20/<: CPT | Performed by: NURSE PRACTITIONER

## 2022-10-07 PROCEDURE — 11043 DBRDMT MUSC&/FSCA 1ST 20/<: CPT

## 2022-10-07 PROCEDURE — 99213 OFFICE O/P EST LOW 20 MIN: CPT

## 2022-10-07 NOTE — PATIENT INSTRUCTIONS
-Keep your wound dressing clean, dry, and intact.    -Change your dressing if it becomes soiled, soaked, or falls off.    -Wound vac may not have any drainage in tube or cannister & it will still be working.   Change cannister if it does become full by pressing tab on side of machine to remove canister and snap on new one. Full canister can be thrown in the trash. If cannister fills with bright red blood - go to ER. Dressing will be changed every MWF at the wound clinic.  If you are having issues with your wound VAC, please consider patching leaks, changing the canister, or calling [phone] for troubleshooting. If the wound VAC has been off or un-operational for over 2 hours, call wound care center to inform them and remove all dressings including black foam and replace with normal saline damp gauze.     -Should you experience any significant changes in your wound(s), such as infection (redness, swelling, localized heat, increased pain, fever > 101 F, chills) or have any questions regarding your home care instructions, please contact the wound center at [phone]. If after hours, contact your primary care physician or go to the hospital emergency room.

## 2022-10-07 NOTE — PROGRESS NOTES
Provider Encounter- Pressure Injury        HISTORY OF PRESENT ILLNESS  Wound History:    START OF CARE IN CLINIC: 5/3/2022    REFERRING PROVIDER: Sahara Mendez      WOUNDS- Pressure Injury, Sacrococcygeal, stage IV                                                             Surgical wound dehiscence, left medial lower leg-status post surgical I&D of stage IV pressure injury and           biologic application                       Abrasion to left anterior lower leg-first observed in clinic 7/22/2022          Pressure injury, DTI, left posterior lower leg-first observed in clinic 7/29/2022       HISTORY: Patient with history of incomplete quadriplegia referred to [facility] for treatment of a stage IV pressure injury.  He has a history of previous pressure injuries to this area, and underwent muscle flaps in 2019, and then again in 2020.  He was seen in the wound clinic in November 2021 for an ulcer proximal from his current ulcer, and pressure injuries to his left posterior lower leg and left heel.  At that time, it was discovered that the patient had retained VAC foam embedded in the wound bed of the sacral wound.  Attempts were made to get him back to his plastic surgeon, though unsuccessful.  In January he underwent surgical removal of VAC sponge along with excisional debridement of his sacral wound by Dr. Chaves.  After the surgery, his wound went on to heal without incident.   In early April 2022, his home health nurse noted a new sacral ulcer, below the previous ulcer which quickly tripled in size over the following weeks.  The ulcer to his left medial lower leg had also deteriorated, with bone visible at the base..  He was hospitalized from 4/22 until 4/27/2022 and underwent surgery with Dr. Raman on 4/26 for irrigation and debridement of multiple compartments of the left lower extremity, bone excision, and complex closure of chronic wound using biologic skin substitute.   His sacrococcygeal wound was not  surgically addressed during this admission.  He was discharged back to his group home, with home health, and referral to outpatient wound clinic for his sacral wound.  He was instructed to follow-up with his surgeon for his lower leg wound.       Postoperatively, the left medial lower leg incision dehisced.  He was seen by his surgeon at [facility] on 5/11.  The surgeon opted to leave remaining sutures in place, and refer him to the wound clinic for treatment of this wound.   Treatment of this wound was initiated in clinic on 5/12.  During this visit was also noted that his heel DTI had resolved, but that he had a new pressure injury to his left posterior lower extremity.     A new pressure injury was noted to patient's right upper buttock/lower back on 5/20/2022.  Wound was linear in shape, skin discolored but intact.     Abrasion noted to left anterior lower leg.  First observed in clinic on 7/22/2022.  Patient states he bumped his leg into his food tray.     Small DTI noted to patient's left lateral lower leg on 7/29/2022.  Skin intact but discolored.     Large area of deep tissue injury noted to patient's left exterior lower leg.  Patient denied any trauma to this area.  Skin intact.  Wound documented.    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  Never used smokeless tobacco    Patient's problem list, allergies, and current medications reviewed and updated in Epic    Interval History:  Interval History thinned 7/29/2022.  Please see previous notes for complete interval history.     7/29/2022 : Clinic visit with ADILSON Arthur, FNP-BC, CWDINESHN, CFCN.  Turk states he continues to do  well.  He was able to go to [employer] yesterday, spent today [place].  His left lower extremity wound has deteriorated, presents today with significant odor and deterioration of tissue.  VAC held from this wound today after debridement.  I also did not apply biologic today.   Sacral wound about the same in area, though some evidence of increasing granulation.  No evidence of infection.  Small abrasion to his left lower leg appears to be improving.  He has a new small DTI to his left lateral lower leg, skin intact but discolored.    8/5/2022 : Clinic visit with ADILSON Arthur, HOLDEN, IMAN, LILIYA.  Anjum continues to feel well.  His left lower wound has improved with VAC hold, will discontinue from this wound altogether.  I did not apply biologic to this wound today given its current improvement.  DTI to posterior left leg is a bit darker today, skin still intact.  Patient states he has been wearing his heel float boot at all times.  Sacral wound with increased granulation, slight decrease in depth, bone still exposed.    8/12/2022 : Clinic visit with ADILSON Arthur, HOLDEN, IMAN, LILIYA.   Anjum states he is feeling well.  His left lower leg wound continues to improve without the use of VAC or biologic.  The deep tissue injury to his posterior leg is gradually improving, area decreasing.  The abrasion to his left shin is now open, was covered with scab last week.  Sacral wound improving slowly, increase granulation, slight decrease in area.  Home health had messaged that they were concerned for infection due to odor.  I did not appreciate any significant odor today.  We did add Puracyn gel to the wound bed prior to reapplying VAC.  Anjum continues to spend most of his day up in his wheelchair, though tries to reposition frequently, and is wearing heel float boots bilaterally at all times.    8/19/2022 : Clinic visit with ADILSON Arthur, HOLDEN, IMAN, LILIYA.   Anjum states he is in his usual state of health,  feeling well overall.  All of his wounds are progressing, though very slowly.  He continues to spend most of the day up in his wheelchair.  He does know to shift his weight frequently, and has an appropriate pressure relief cushion in his chair.    He was seen by [facility] earlier this week, for complaints of leakage around his suprapubic catheter and fever.  He was prescribed Keflex, and his catheter was changed    8/26/2022: Clinic visit with ADILSON Flores.  Patient reports overall he is feeling well denies any fever or chills.  Patient reports that he is continuously wearing Prevalon boots to offload bilateral lower extremities.  The left medial lower extremity wound is quite boggy with a small amount of turbulent fluid which was expressed with minimal palpation to the periwound area.  There is no foul-smelling odor emanating from the wound bed there is no erythema or edema.    9/2/2022 : Clinic visit with ADILSON Arthur, FNP-BC, CWOCN, CFCN.   States he is feeling well overall, denies fevers, chills, nausea, vomiting, cough or shortness of breath.  Unfortunately, his wounds are not progressing significantly.  Leg wound presents with boggy tissue, and probes to bone.  Area and depth of sacral wound have not changed significantly, bone still exposed.  Previously has been seen by several different plastic surgeons, including Dr. Rodriguez, Dr. Nicolas,  Dr. Mott, Dr. Chaves, he was also referred to Dr. Browne at [facility].  None of these surgeons have agreed to see him thus far.    9/9/2022: Clinic visit with Steve Hodges MD. Patient reports doing ok. Denies any changes to health or symptoms of infection. Wounds are not progressing, leg wound can be probed to bone and tissue is boggy. Wound VAC to sacrum with bone still exposed. He reports previously being treated for OM for 6 weeks without resolution. Discussed possibly getting imaging to eval for OM, however with chronic OM there is limited to no role  for prolonged Abx therapy per IDSA guidelines.    9/16/2022: Clinic visit with Steve Hodges MD. Patient reports feeling his normal state of health, denies any fevers or chills. His medial left leg wound is boggy and I was able to express some tan fluid concerning for purulence. Will take wound culture and order abx as appropriate for the results. Will not start empirical Abx as no evidence of cellulitis and his history of multiple rounds of Abx in the past. Will order CT scan of lower extremity and sacrum to further evaluate areas. Sacrum still to bone, had previous history of some kind of retained material in wound bed, will obtain the CT scan to evaluate.    9/23/2022: Clinic visit with Steve Hodges MD. Patient reports doing ok, denies any fevers or chills. Patient is taking Augmentin, reports tolerating well without any side effects. Tissue quality leg wound remains poor, still with some purulence able to be expressed but less than last week. Patient has CT scan planned for 10/3 and had labs today to ensure adequate renal function. Sacrum measuring smaller, with larger undermining.    9/30/2022 : Clinic visit with ADILSON Arthur, HOLDEN, IMAN, LILIYA.   Anjum states that he is feeling well.  He is still taking Augmentin, reports no adverse effects.  Purulent drainage still noted from lower leg wound, with boggy, dusky tissue in the wound bed.  We will extend Augmentin and additional 2 weeks.   Opening of sacral wound has decreased since last assessment, increase granulation within the wound bed noted, however bone still exposed.  His CT is scheduled for Monday 10/3, of both sacrum and his lower leg.    10/7/2022 : Clinic visit with ADILSON Arthur FNP-BC, IMAN, LILIYA.   Anjum states he is feeling well, offers no complaints.  CT results discussed with him in clinic today, as well as recommendations from interdisciplinary rounds a few days ago.  He was agreeable to allowing for today I&D of his leg wound  today.  Will place referral to Dr. Saini for consideration of plastic intervention.  Patient states that if he were to undergo a flap procedure, he would be willing to be admitted to a skilled nursing facility long enough to allow for healing.   By removed a small chip of bone from leg wound during I&D.  Tissue is dusky and friable.  Will refer patient back to Dr. Raman, orthopedics, for reevaluation.     REVIEW OF SYSTEMS:   Unchanged from previous clinic visit on 9/30/2022    PHYSICAL EXAMINATION:   BP (!) 151/81   Pulse (!) 52   Temp (!) 20 °C (68 °F)   Resp 20   SpO2 100% Comment: RA  Physical Exam  Constitutional:       Appearance: He is obese.   Cardiovascular:      Rate and Rhythm: Bradycardia present.   Pulmonary:      Effort: Pulmonary effort is normal. No respiratory distress.      Breath sounds: No wheezing.   Skin:     Comments: Stage IV coccygeal pressure injury-undermining persists, total wound area has not decreased significantly, bone still exposed, there is some granulation tissue within the wound bed, moderate serosanguineous drainage.  Periwound tissue is intact without erythema or induration.  Full-thickness wound to left medial lower leg: Small bone chip removed with bedside I&D, tissue dusky in color and friable, moderate amount of seropurulent drainage, periwound ecchymosis    See flow sheet for details   Neurological:      Mental Status: He is alert and oriented to person, place, and time.       WOUND ASSESSMENT  Wound 04/11/22 Full Thickness Wound Leg Medial Left --Left Medial Lower Leg (Active)   Wound Image    10/07/22 1400   Site Assessment Red;Purple;Boggy 10/07/22 1400   Periwound Assessment Edema;Fragile 10/07/22 1400   Margins Attached edges 10/07/22 1400   Drainage Amount Large 10/07/22 1400   Drainage Description Serosanguineous 10/07/22 1400   Treatments Cleansed;Provider debridement;Site care 10/07/22 1400   Wound Cleansing Puracyn [place] 10/07/22 1400   Periwound Protectant  Skin Protectant Wipes to Periwound;Barrier Paste 10/07/22 1400   Dressing Cleansing/Solutions Not Applicable 10/07/22 1400   Dressing Options Hydrofiber Silver;Mepilex;Tubigrip 10/07/22 1400   Dressing Changed Changed 10/07/22 1400   Dressing Change/Treatment Frequency Monday, Wednesday, Friday, and As Needed 10/07/22 1400   Non-staged Wound Description Full thickness 10/07/22 1400   Wound Length (cm) 2.5 cm 10/07/22 1400   Wound Width (cm) 1 cm 10/07/22 1400   Wound Depth (cm) 1.5 cm 10/07/22 1400   Wound Surface Area (cm^2) 2.5 cm^2 10/07/22 1400   Wound Volume (cm^3) 3.75 cm^3 10/07/22 1400   Post-Procedure Length (cm) 2.5 cm 10/07/22 1400   Post-Procedure Width (cm) 2.5 cm 10/07/22 1400   Post-Procedure Depth (cm) 1.5 cm 10/07/22 1400   Post-Procedure Surface Area (cm^2) 6.25 cm^2 10/07/22 1400   Post-Procedure Volume (cm^3) 9.375 cm^3 10/07/22 1400   Wound Healing % -7713 10/07/22 1400   Tunneling (cm) 0 cm 10/07/22 1400   Tunneling Clock Position of Wound 12 07/22/22 1400   Undermining (cm) 0 cm 10/07/22 1400   Undermining of Wound, 1st Location From 2 o'clock;To 4 o'clock 06/03/22 1300   Wound Odor None 10/07/22 1400   Exposed Structures Bone 10/07/22 1400   Number of days: 179       Wound 04/22/22 Pressure Injury Sacrum POA stage 4 (Active)   Wound Image    10/07/22 1400   Site Assessment Red;Other (Comment);Yellow 10/07/22 1400   Periwound Assessment Scar tissue 10/07/22 1400   Margins Unattached edges 10/07/22 1400   Drainage Amount Moderate 10/07/22 1400   Drainage Description Serosanguineous 10/07/22 1400   Treatments Cleansed;Provider debridement;Site care 10/07/22 1400   Wound Cleansing Puracyn [place] 10/07/22 1400   Periwound Protectant Skin Protectant Wipes to Periwound;Paste Ring;Drape;Benzoin 10/07/22 1400   Dressing Cleansing/Solutions Not Applicable 10/07/22 1400   Dressing Options Wound Vac;Mepilex 10/07/22 1400   Dressing Changed Changed 10/07/22 1400   Dressing Change/Treatment Frequency  Monday, Wednesday, Friday, and As Needed 10/07/22 1400   WOUND NURSE ONLY - Pressure Injury Stage 4 10/07/22 1400   Wound Length (cm) 1.5 cm 10/07/22 1400   Wound Width (cm) 2 cm 10/07/22 1400   Wound Depth (cm) 1.9 cm 10/07/22 1400   Wound Surface Area (cm^2) 3 cm^2 10/07/22 1400   Wound Volume (cm^3) 5.7 cm^3 10/07/22 1400   Post-Procedure Length (cm) 1.5 cm 10/07/22 1400   Post-Procedure Width (cm) 2 cm 10/07/22 1400   Post-Procedure Depth (cm) 2 cm 10/07/22 1400   Post-Procedure Surface Area (cm^2) 3 cm^2 10/07/22 1400   Post-Procedure Volume (cm^3) 6 cm^3 10/07/22 1400   Wound Healing % -138 10/07/22 1400   Tunneling (cm) 3 cm 10/07/22 1400   Tunneling Clock Position of Wound 9 10/07/22 1400   Undermining (cm) 2 cm 10/07/22 1400   Undermining of Wound, 1st Location From 10 o'clock;To 3 o'clock 10/07/22 1400   Undermining (cm) - 2nd location 3 cm 07/15/22 1400   Undermining of Wound, 2nd Location From 9 o'clock;To 11 o'clock 07/15/22 1400   Undermining (cm) - 3rd location 3.2 cm 06/24/22 1000   Undermining of Wound, 3rd Location From 7 o'clock;To 11 o'clock 06/24/22 1000   Wound Odor None 10/07/22 1400   Exposed Structures Bone 10/07/22 1400   Number of days: 168       Wound 07/22/22 Traumatic Leg Anterior Left (Active)   Wound Image   10/07/22 1400   Site Assessment Purple;Red 10/07/22 1400   Periwound Assessment Edema;Fragile 10/07/22 1400   Margins Attached edges 08/19/22 1400   Drainage Amount None 10/07/22 1400   Drainage Description Serosanguineous 08/19/22 1400   Treatments Cleansed;Site care 10/07/22 1400   Wound Cleansing Foam Cleanser/Washcloth 10/07/22 1400   Periwound Protectant Skin Protectant Wipes to Periwound;Barrier Paste 10/07/22 1400   Dressing Cleansing/Solutions Not Applicable 10/07/22 1400   Dressing Options Mepilex;Tubigrip 10/07/22 1400   Dressing Changed Changed 10/07/22 1400   Dressing Change/Treatment Frequency Monday, Wednesday, Friday, and As Needed 07/29/22 1424   Non-staged Wound  Description Full thickness 08/12/22 1300   Wound Length (cm) 0.4 cm 08/19/22 1400   Wound Width (cm) 0.4 cm 08/19/22 1400   Wound Depth (cm) 0.1 cm 08/19/22 1400   Wound Surface Area (cm^2) 0.16 cm^2 08/19/22 1400   Wound Volume (cm^3) 0.016 cm^3 08/19/22 1400   Post-Procedure Length (cm) 0.9 cm 08/12/22 1300   Post-Procedure Width (cm) 0.5 cm 08/12/22 1300   Post-Procedure Depth (cm) 0.1 cm 08/12/22 1300   Post-Procedure Surface Area (cm^2) 0.45 cm^2 08/12/22 1300   Post-Procedure Volume (cm^3) 0.045 cm^3 08/12/22 1300   Tunneling (cm) 0 cm 10/07/22 1400   Undermining (cm) 0 cm 10/07/22 1400   Wound Odor None 10/07/22 1400   Exposed Structures None 10/07/22 1400   Number of days: 77       Wound 07/29/22 Pressure Injury Leg Posterior;Lateral Left (Active)   Wound Image   10/07/22 1400   Site Assessment Purple;Red 10/07/22 1400   Periwound Assessment Dry;Intact 10/07/22 1400   Drainage Amount None 10/07/22 1400   Drainage Description Sanguineous 08/12/22 1300   Treatments Cleansed;Site care 10/07/22 1400   Wound Cleansing Foam Cleanser/Washcloth 10/07/22 1400   Periwound Protectant Skin Protectant Wipes to Periwound;Barrier Paste 10/07/22 1400   Dressing Cleansing/Solutions Not Applicable 10/07/22 1400   Dressing Options Mepilex;Tubigrip 10/07/22 1400   Dressing Changed Changed 10/07/22 1400   Dressing Change/Treatment Frequency Monday, Wednesday, Friday, and As Needed 07/29/22 1424   WOUND NURSE ONLY - Pressure Injury Stage DTPI 10/07/22 1400   Wound Length (cm) 0.1 cm 08/12/22 1300   Wound Width (cm) 0.1 cm 08/12/22 1300   Wound Depth (cm) 0.1 cm 08/12/22 1300   Wound Surface Area (cm^2) 0.01 cm^2 08/12/22 1300   Wound Volume (cm^3) 0.001 cm^3 08/12/22 1300   Tunneling (cm) 0 cm 09/30/22 1600   Undermining (cm) 0 cm 09/30/22 1600   Wound Odor None 10/07/22 1400   Exposed Structures None 10/07/22 1400   Number of days: 70     PROCEDURE: I&D of left medial lower leg wound  - patient's leg is insensate, no need for  topical or local anesthesia  -Scalpel used to excise triangular wedge of skin and subcutaneous tissue over wound undermining.  Area of tissue removed approximately 6.25 cm²   -Forceps used to remove small chip of bone  -Curette then used to debride wound bed.  Excisional debridement was performed to remove devitalized tissue until healthy, bleeding tissue was visualized.   Entire surface of wound, 0.9 cm2 debrided.  Tissue debrided into the muscle layer.    -Bleeding controlled with manual pressure.    -Wound care completed by wound RN, refer to flowsheet  -Patient tolerated the procedure well, without c/o pain or discomfort.      PROCEDURE: Excisional debridement of sacrococcygeal wound   -2% viscous lidocaine applied topically to wound beds for approximately 5 minutes prior to debridement  -Curette used to debride wound beds.  Excisional debridement was performed to remove devitalized tissue until healthy, bleeding tissue was visualized.  Debridement was carried into the undermined areas of the sacral wound.  Total area debrided approximately 8.0 cm² (including into wound undermining).  Deepest level of debridement was into the muscle / fascial layer.  -Bleeding controlled with manual pressure.    -Wound care completed by wound RN, refer to flowsheet  -Patient tolerated the procedure well, without c/o pain or discomfort.     BIOLOGIC LOG  5/20/2022-first application.  PRODUCT: EpiCord 2 x 3 cm . PRODUCT #[id number]; EXPIRES: 2026-12-01 5/27/2022- second application.  PRODUCT: EpiCordEX 2 x 3 cm . PRODUCT #[id number]; EXPIRES: 2026-09-01    6/3/2022- third application.  PRODUCT: EpiCordEX 2 x 3 cm . PRODUCT #[id number]; EXPIRES: 2026-10-01    6/10/2022- fourth application.  PRODUCT: EpiCordEX 2 x 3 cm . PRODUCT #[id number]; EXPIRES: 2026-09-01 6/17/2022- fifth application.  PRODUCT: EpiCordEX 2 x 3 cm . PRODUCT #[id number]; EXPIRES: 2027-02-01 6/24/2022- sixth  application.  PRODUCT: EpiCordEX 2 x 3 cm . PRODUCT #[id number]; EXPIRES: 2027-02-01 07/01/22: Seventh application.  Product: Epicort Dx 2.0 x 3.0 cm reorder number [id number]; product number [id number]; expires 2027-02-01 7/22/2022- eighth application.  PRODUCT: EpiCord 2 x 3 cm, reorder #[id number]. PRODUCT #[id number]; EXPIRES: 2027-02-01      Pertinent Labs and Diagnostics:    Labs:     A1c: No results found for: HBA1C     [facility] results, 7/1/2022 (under media tab)    CRP 13    ESR 31      IMAGING:     10/3/2022-CT of pelvis with contrast  IMPRESSION:     1.  Posterior soft tissue sacral wound and 1.5 x 3.7 cm abscess.   2.  Progressive destruction of the distal sacrum and proximal coccyx. Sclerosis and irregularity of the distal sacrum consistent with osteomyelitis.   3.  Old wound within the soft tissues posterior to the distal left SI joint with possible fistulous communication.    10/3/2022-CT of tib-fib with and without contrast, with reconstruction  IMPRESSION:     1.  Old fractures of the left tibia and fibula with extensive callus formation and bony bridging as well as extensive dystrophic calcifications. Similar to previous.   2.  Distal medial soft tissue wounds with ill-defined superficial in the soft tissue thickening and fluid collections suggestive of phlegmon. No organized abscess identified.   3.  No definite osteomyelitis.   4.  Arthritic changes.        VASCULAR STUDIES: No results found.    LAST  WOUND CULTURE:   Lab Results   Component Value Date/Time    CULTRSULT No growth at 48 hours. 09/22/2022 01:00 PM          ASSESSMENT AND PLAN:     1. Sacral decubitus ulcer, stage IV ([facility], located within [facility])  Comments: Ulcer first noted in early April 2022 as small open area which quickly enlarged.  This ulcer is present distal from previous sacral ulcer which healed after surgery in January.  Patient has history of flap reconstruction x2 to this area.    10/7/2022: Wound remains stalled.  CT shows  progressive destruction of distal sacrum and proximal coccyx.  Discussed during interdisciplinary clinic rounds, recommendation was to refer patient to Dr. Saini for consideration of surgical intervention.    -Excisional debridement of wound in clinic today, medically necessary to promote wound healing  -Patient is to return to clinic weekly for assessment and debridement   -Home health to continue to change the wound VAC dressing 2 times per week in between clinic visits.  -Referral to Dr. Saini for possible surgical intervention  -Healing of this wound is further complicated by the patient's multiple comorbidities, exposed bone, and significant pressure from sitting in his wheelchair      Wound care: NPWT at 125 mmHg to accelerate granulation, change 3 times per week.      2.  Postoperative wound dehiscence, subsequent encounter  Comments: On 4/26 patient underwent irrigation and debridement of multiple compartments of the left lower extremity states for pressure injury, with bone excision, and complex closure of chronic wound using biologic skin substitute.  During postop visit in surgeons office on 5/11, surgical site was noted to be dehisced.  Patient was referred to wound clinic for management.    10/7/2022: CT results shows old fractures with extensive callus formation and bony bridging, as well as dystrophic calcifications.  Also shows soft tissue thickening and fluid collection, though no organized abscess.  Discussed in clinic interdisciplinary rounds earlier this week, bedside I&D to excise phlegmonous material recommended  -Bedside I&D performed in clinic today.  Anesthesia not required as patient is insensate  -Excisional debridement of entire wound in clinic today, medically necessary to promote wound healing.  -Patient is still waiting for Augmentin prescription.  This was sent to mail order pharmacy inadvertently  -Patient to return to clinic weekly for assessment and debridement  -Home health to  change dressing 1-2 times per week in between clinic visits   -Due to deterioration of wound, will refer patient back to Dr. Raman at [facility]  -Consider resuming biologic and/or VAC    Wound care: Silver Hydrofiber to manage exudate and bioburden, Mepilex, Tubigrip D to manage edema      3.  Pressure injury of left leg, deep tissue injury  Comments: DTI to posterior left lower leg first observed in clinic 7/29/2022.  Patient does not know how it started, had been wearing heel float boot consistently.    10/7/2022: Remains stable.  Skin still intact  -Continue to monitor for any deterioration in tissue quality  -Patient is currently wearing Prevalon boots to help prevent pressure injuries to bilateral lower extremities    Wound care: Mepilex, Tubigrip D      4.  Quadriplegia, C5-C7, complete (HCC)  Comments: Complicating factor.  Impaired mobility and sensation  -Patient is still spending 7-8 hours/day up in his wheelchair, though he does have appropriate offloading cushion, and knows to reposition frequently.  Wears heel float boots bilaterally at all times    5. Wound Infection    10/7/2022: Patient still has not received reorder of Augmentin.  This was inadvertently sent to mail order pharmacy.  He was told he should be receiving any day now    My total time spent caring for the patient on the day of the encounter was 20 minutes.   This does not include time spent on separately billable procedures/tests.       Please note that this note may have been created using voice recognition software. I have worked with technical experts from [employer] to optimize the interface.  I have made every reasonable attempt to correct obvious errors, but there may be errors of grammar and possibly content that I did not discover before finalizing the note.

## 2022-10-13 ENCOUNTER — NON-PROVIDER VISIT (OUTPATIENT)
Dept: CARDIOLOGY | Facility: MEDICAL CENTER | Age: 72
End: 2022-10-13
Payer: MEDICARE

## 2022-10-13 PROCEDURE — 93298 REM INTERROG DEV EVAL SCRMS: CPT | Performed by: INTERNAL MEDICINE

## 2022-10-14 ENCOUNTER — OFFICE VISIT (OUTPATIENT)
Dept: WOUND CARE | Facility: MEDICAL CENTER | Age: 72
End: 2022-10-14
Attending: INTERNAL MEDICINE
Payer: MEDICARE

## 2022-10-14 VITALS
HEART RATE: 56 BPM | TEMPERATURE: 97.9 F | SYSTOLIC BLOOD PRESSURE: 122 MMHG | RESPIRATION RATE: 18 BRPM | DIASTOLIC BLOOD PRESSURE: 77 MMHG | OXYGEN SATURATION: 94 %

## 2022-10-14 DIAGNOSIS — T81.31XD POSTOPERATIVE WOUND DEHISCENCE, SUBSEQUENT ENCOUNTER: ICD-10-CM

## 2022-10-14 DIAGNOSIS — G82.53 QUADRIPLEGIA, C5-C7, COMPLETE (HCC): ICD-10-CM

## 2022-10-14 DIAGNOSIS — L08.9 WOUND INFECTION: ICD-10-CM

## 2022-10-14 DIAGNOSIS — L89.890 PRESSURE INJURY OF LEFT LEG, UNSTAGEABLE (HCC): ICD-10-CM

## 2022-10-14 DIAGNOSIS — T14.8XXA WOUND INFECTION: ICD-10-CM

## 2022-10-14 DIAGNOSIS — L89.154 SACRAL DECUBITUS ULCER, STAGE IV (HCC): ICD-10-CM

## 2022-10-14 DIAGNOSIS — T14.8XXA DEEP TISSUE INJURY: ICD-10-CM

## 2022-10-14 DIAGNOSIS — S81.802D WOUND OF LEFT LOWER EXTREMITY, SUBSEQUENT ENCOUNTER: ICD-10-CM

## 2022-10-14 PROCEDURE — 97605 NEG PRS WND THER DME<=50SQCM: CPT

## 2022-10-14 PROCEDURE — 11043 DBRDMT MUSC&/FSCA 1ST 20/<: CPT

## 2022-10-14 PROCEDURE — 11043 DBRDMT MUSC&/FSCA 1ST 20/<: CPT | Performed by: STUDENT IN AN ORGANIZED HEALTH CARE EDUCATION/TRAINING PROGRAM

## 2022-10-14 NOTE — PROCEDURES
VAC dressing removed by this RN. Wound bed inspected with flashlight and no residual foam noted. NPWT dressing changed this visit using 3 pieces of black foam to fill wound bed and bridge to right hip. NPWT resumed at 125 mm Hg continuous. No leaks noted.

## 2022-10-14 NOTE — PROGRESS NOTES
Provider Encounter- Pressure Injury        HISTORY OF PRESENT ILLNESS  Wound History:    START OF CARE IN CLINIC: 5/3/2022    REFERRING PROVIDER: Sahara Mendez      WOUNDS- Pressure Injury, Sacrococcygeal, stage IV                                                             Surgical wound dehiscence, left medial lower leg-status post surgical I&D of stage IV pressure injury and           biologic application                       Abrasion to left anterior lower leg-first observed in clinic 7/22/2022          Pressure injury, DTI, left posterior lower leg-first observed in clinic 7/29/2022       HISTORY: Patient with history of incomplete quadriplegia referred to [facility] for treatment of a stage IV pressure injury.  He has a history of previous pressure injuries to this area, and underwent muscle flaps in 2019, and then again in 2020.  He was seen in the wound clinic in November 2021 for an ulcer proximal from his current ulcer, and pressure injuries to his left posterior lower leg and left heel.  At that time, it was discovered that the patient had retained VAC foam embedded in the wound bed of the sacral wound.  Attempts were made to get him back to his plastic surgeon, though unsuccessful.  In January he underwent surgical removal of VAC sponge along with excisional debridement of his sacral wound by Dr. Chaves.  After the surgery, his wound went on to heal without incident.   In early April 2022, his home health nurse noted a new sacral ulcer, below the previous ulcer which quickly tripled in size over the following weeks.  The ulcer to his left medial lower leg had also deteriorated, with bone visible at the base..  He was hospitalized from 4/22 until 4/27/2022 and underwent surgery with Dr. Raman on 4/26 for irrigation and debridement of multiple compartments of the left lower extremity, bone excision, and complex closure of chronic wound using biologic skin substitute.   His sacrococcygeal wound was not  surgically addressed during this admission.  He was discharged back to his group home, with home health, and referral to outpatient wound clinic for his sacral wound.  He was instructed to follow-up with his surgeon for his lower leg wound.       Postoperatively, the left medial lower leg incision dehisced.  He was seen by his surgeon at [facility] on 5/11.  The surgeon opted to leave remaining sutures in place, and refer him to the wound clinic for treatment of this wound.   Treatment of this wound was initiated in clinic on 5/12.  During this visit was also noted that his heel DTI had resolved, but that he had a new pressure injury to his left posterior lower extremity.     A new pressure injury was noted to patient's right upper buttock/lower back on 5/20/2022.  Wound was linear in shape, skin discolored but intact.     Abrasion noted to left anterior lower leg.  First observed in clinic on 7/22/2022.  Patient states he bumped his leg into his food tray.     Small DTI noted to patient's left lateral lower leg on 7/29/2022.  Skin intact but discolored.     Large area of deep tissue injury noted to patient's left exterior lower leg.  Patient denied any trauma to this area.  Skin intact.  Wound documented.    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  Never used smokeless tobacco    Patient's problem list, allergies, and current medications reviewed and updated in Epic    Interval History:  Interval History thinned 7/29/2022.  Please see previous notes for complete interval history.     7/29/2022 : Clinic visit with ADILSON Arthur, FNP-BC, CWDINESHN, CFCN.  Turk states he continues to do  well.  He was able to go to [place] yesterday, spent today [place].  His left lower extremity wound has deteriorated, presents today with significant odor and deterioration of tissue.  VAC held from this wound today after debridement.  I also did not apply biologic today.   Sacral wound about the same in area, though some evidence of increasing granulation.  No evidence of infection.  Small abrasion to his left lower leg appears to be improving.  He has a new small DTI to his left lateral lower leg, skin intact but discolored.    8/5/2022 : Clinic visit with ADILSON Arthur, HOLDEN, IMAN, LILIYA.  Anjum continues to feel well.  His left lower wound has improved with VAC hold, will discontinue from this wound altogether.  I did not apply biologic to this wound today given its current improvement.  DTI to posterior left leg is a bit darker today, skin still intact.  Patient states he has been wearing his heel float boot at all times.  Sacral wound with increased granulation, slight decrease in depth, bone still exposed.    8/12/2022 : Clinic visit with ADILSON Arthur, HOLDEN, IMAN, LILIYA.   Anjum states he is feeling well.  His left lower leg wound continues to improve without the use of VAC or biologic.  The deep tissue injury to his posterior leg is gradually improving, area decreasing.  The abrasion to his left shin is now open, was covered with scab last week.  Sacral wound improving slowly, increase granulation, slight decrease in area.  Home health had messaged that they were concerned for infection due to odor.  I did not appreciate any significant odor today.  We did add Puracyn gel to the wound bed prior to reapplying VAC.  Anjum continues to spend most of his day up in his wheelchair, though tries to reposition frequently, and is wearing heel float boots bilaterally at all times.    8/19/2022 : Clinic visit with ADILSON Arthur, HOLDEN, IMAN, LILIYA.   Anjum states he is in his usual state of health,  feeling well overall.  All of his wounds are progressing, though very slowly.  He continues to spend most of the day up in his wheelchair.  He does know to shift his weight frequently, and has an appropriate pressure relief cushion in his chair.    He was seen by [facility] earlier this week, for complaints of leakage around his suprapubic catheter and fever.  He was prescribed Keflex, and his catheter was changed    8/26/2022: Clinic visit with ADILSON Flores.  Patient reports overall he is feeling well denies any fever or chills.  Patient reports that he is continuously wearing Prevalon boots to offload bilateral lower extremities.  The left medial lower extremity wound is quite boggy with a small amount of turbulent fluid which was expressed with minimal palpation to the periwound area.  There is no foul-smelling odor emanating from the wound bed there is no erythema or edema.    9/2/2022 : Clinic visit with ADILSON Arthur, FNP-BC, CWOCN, CFCN.   States he is feeling well overall, denies fevers, chills, nausea, vomiting, cough or shortness of breath.  Unfortunately, his wounds are not progressing significantly.  Leg wound presents with boggy tissue, and probes to bone.  Area and depth of sacral wound have not changed significantly, bone still exposed.  Previously has been seen by several different plastic surgeons, including Dr. Rodriguez, Dr. Nicolas,  Dr. Mott, Dr. Chaves, he was also referred to Dr. Browne at [facility].  None of these surgeons have agreed to see him thus far.    9/9/2022: Clinic visit with Steve Hodges MD. Patient reports doing ok. Denies any changes to health or symptoms of infection. Wounds are not progressing, leg wound can be probed to bone and tissue is boggy. Wound VAC to sacrum with bone still exposed. He reports previously being treated for OM for 6 weeks without resolution. Discussed possibly getting imaging to eval for OM, however with chronic OM there is limited to no role  for prolonged Abx therapy per IDSA guidelines.    9/16/2022: Clinic visit with Steve Hodges MD. Patient reports feeling his normal state of health, denies any fevers or chills. His medial left leg wound is boggy and I was able to express some tan fluid concerning for purulence. Will take wound culture and order abx as appropriate for the results. Will not start empirical Abx as no evidence of cellulitis and his history of multiple rounds of Abx in the past. Will order CT scan of lower extremity and sacrum to further evaluate areas. Sacrum still to bone, had previous history of some kind of retained material in wound bed, will obtain the CT scan to evaluate.    9/23/2022: Clinic visit with Steve Hodges MD. Patient reports doing ok, denies any fevers or chills. Patient is taking Augmentin, reports tolerating well without any side effects. Tissue quality leg wound remains poor, still with some purulence able to be expressed but less than last week. Patient has CT scan planned for 10/3 and had labs today to ensure adequate renal function. Sacrum measuring smaller, with larger undermining.    9/30/2022 : Clinic visit with ADILSON Arthur, HOLDEN, IMAN, LILIYA.   Anjum states that he is feeling well.  He is still taking Augmentin, reports no adverse effects.  Purulent drainage still noted from lower leg wound, with boggy, dusky tissue in the wound bed.  We will extend Augmentin and additional 2 weeks.   Opening of sacral wound has decreased since last assessment, increase granulation within the wound bed noted, however bone still exposed.  His CT is scheduled for Monday 10/3, of both sacrum and his lower leg.    10/7/2022 : Clinic visit with ADILSON Arthur FNP-BC, IMAN, LILIYA.   Anjum states he is feeling well, offers no complaints.  CT results discussed with him in clinic today, as well as recommendations from interdisciplinary rounds a few days ago.  He was agreeable to allowing for today I&D of his leg wound  today.  Will place referral to Dr. Saini for consideration of plastic intervention.  Patient states that if he were to undergo a flap procedure, he would be willing to be admitted to a skilled nursing facility long enough to allow for healing.   By removed a small chip of bone from leg wound during I&D.  Tissue is dusky and friable.  Will refer patient back to Dr. Raman, orthopedics, for reevaluation.    10/14/2022: Clinic visit with Steve Hodges MD. Patient reports doing well, denies any fevers or chills. Patient reports that his home health nurse feels leg wound improving, appears unchanged and probes to bone. CT scan of leg without definitive evidence of OM, was referred back to orthopedics with Dr. Raman but does not have appointment yet. He was also referred to Dr. Saini, does not have appointment.     REVIEW OF SYSTEMS:   Unchanged from previous clinic visit on 10/7/2022    PHYSICAL EXAMINATION:   /77 (BP Location: Right arm, Patient Position: Sitting)   Pulse (!) 56   Temp 36.6 °C (97.9 °F) (Temporal)   Resp 18   SpO2 94%   Physical Exam  Constitutional:       Appearance: He is obese.   Cardiovascular:      Rate and Rhythm: Bradycardia present.   Pulmonary:      Effort: Pulmonary effort is normal. No respiratory distress.      Breath sounds: No wheezing.   Skin:     Comments: Stage IV coccygeal pressure injury - Largely unchanged with exposed bone  Full-thickness wound to left medial lower leg - Measuring larger after last weeks I&D, tissue remains boggy and friable. Continues to probe to bone.    See flow sheet for details   Neurological:      Mental Status: He is alert and oriented to person, place, and time.       WOUND ASSESSMENT  Wound 04/11/22 Full Thickness Wound Leg Medial Left --Left Medial Lower Leg (Active)   Wound Image    10/14/22 1400   Site Assessment Red;Purple;Boggy;Other (Comment) 10/14/22 1400   Periwound Assessment Edema;Fragile;Other (Comment) 10/14/22 1400   Margins  Attached edges 10/14/22 1400   Drainage Amount Large 10/14/22 1400   Drainage Description Serosanguineous 10/14/22 1400   Treatments Cleansed;Provider debridement;Site care 10/14/22 1400   Wound Cleansing Puracyn [place] 10/14/22 1400   Periwound Protectant Skin Protectant Wipes to Periwound;Barrier Paste 10/14/22 1400   Dressing Cleansing/Solutions Not Applicable 10/14/22 1400   Dressing Options Hydrofiber Silver;Mepilex;Tubigrip 10/14/22 1400   Dressing Changed Changed 10/14/22 1400   Dressing Change/Treatment Frequency Monday, Wednesday, Friday, and As Needed 10/14/22 1400   Non-staged Wound Description Full thickness 10/14/22 1400   Wound Length (cm) 2.5 cm 10/14/22 1400   Wound Width (cm) 2.4 cm 10/14/22 1400   Wound Depth (cm) 1 cm 10/14/22 1400   Wound Surface Area (cm^2) 6 cm^2 10/14/22 1400   Wound Volume (cm^3) 6 cm^3 10/14/22 1400   Post-Procedure Length (cm) 2.5 cm 10/14/22 1400   Post-Procedure Width (cm) 2.5 cm 10/14/22 1400   Post-Procedure Depth (cm) 1.5 cm 10/14/22 1400   Post-Procedure Surface Area (cm^2) 6.25 cm^2 10/14/22 1400   Post-Procedure Volume (cm^3) 9.375 cm^3 10/14/22 1400   Wound Healing % -[id number] 10/14/22 1400   Tunneling (cm) 0 cm 10/14/22 1400   Tunneling Clock Position of Wound 12 07/22/22 1400   Undermining (cm) 0 cm 10/14/22 1400   Undermining of Wound, 1st Location From 2 o'clock;To 4 o'clock 06/03/22 1300   Wound Odor None 10/14/22 1400   Exposed Structures Bone 10/14/22 1400   Number of days: 186       Wound 04/22/22 Pressure Injury Sacrum POA stage 4 (Active)   Wound Image    10/14/22 1400   Site Assessment Red;Other (Comment);Yellow;White 10/14/22 1400   Periwound Assessment Scar tissue 10/14/22 1400   Margins Unattached edges 10/14/22 1400   Drainage Amount Moderate 10/14/22 1400   Drainage Description Serosanguineous 10/14/22 1400   Treatments Cleansed;Provider debridement;Site care 10/14/22 1400   Wound Cleansing Puracyn [place] 10/14/22 1400   Periwound Protectant Skin  Protectant Wipes to Periwound;Paste Ring;Drape;Benzoin 10/14/22 1400   Dressing Cleansing/Solutions Not Applicable 10/14/22 1400   Dressing Options Wound Vac;Mepilex 10/14/22 1400   Dressing Changed Changed 10/14/22 1400   Dressing Change/Treatment Frequency Monday, Wednesday, Friday, and As Needed 10/14/22 1400   WOUND NURSE ONLY - Pressure Injury Stage 4 10/14/22 1400   Wound Length (cm) 1.3 cm 10/14/22 1400   Wound Width (cm) 2 cm 10/14/22 1400   Wound Depth (cm) 1.5 cm 10/14/22 1400   Wound Surface Area (cm^2) 2.6 cm^2 10/14/22 1400   Wound Volume (cm^3) 3.9 cm^3 10/14/22 1400   Post-Procedure Length (cm) 1.3 cm 10/14/22 1400   Post-Procedure Width (cm) 2.1 cm 10/14/22 1400   Post-Procedure Depth (cm) 2 cm 10/14/22 1400   Post-Procedure Surface Area (cm^2) 2.73 cm^2 10/14/22 1400   Post-Procedure Volume (cm^3) 5.46 cm^3 10/14/22 1400   Wound Healing % -63 10/14/22 1400   Tunneling (cm) 2.2 cm 10/14/22 1400   Tunneling Clock Position of Wound 9 10/14/22 1400   Undermining (cm) 2 cm 10/07/22 1400   Undermining of Wound, 1st Location From 6 o'clock;To 1 o'clock 10/14/22 1400   Undermining (cm) - 2nd location 3 cm 07/15/22 1400   Undermining of Wound, 2nd Location From 9 o'clock;To 11 o'clock 07/15/22 1400   Undermining (cm) - 3rd location 3.2 cm 06/24/22 1000   Undermining of Wound, 3rd Location From 7 o'clock;To 11 o'clock 06/24/22 1000   Wound Odor None 10/14/22 1400   Exposed Structures Bone 10/14/22 1400   Number of days: 175       Wound 07/22/22 Traumatic Leg Anterior Left (Active)   Wound Image   10/14/22 1400   Site Assessment Purple;Red 10/14/22 1400   Periwound Assessment Dry;Intact 10/14/22 1400   Margins Attached edges 08/19/22 1400   Drainage Amount None 10/14/22 1400   Drainage Description Serosanguineous 08/19/22 1400   Treatments Cleansed;Site care 10/14/22 1400   Wound Cleansing Foam Cleanser/Washcloth 10/14/22 1400   Periwound Protectant Barrier Paste 10/14/22 1400   Dressing Cleansing/Solutions  Not Applicable 10/14/22 1400   Dressing Options Mepilex;Tubigrip 10/14/22 1400   Dressing Changed Changed 10/14/22 1400   Dressing Change/Treatment Frequency Monday, Wednesday, Friday, and As Needed 10/14/22 1400   Non-staged Wound Description Full thickness 08/12/22 1300   Wound Length (cm) 0.4 cm 08/19/22 1400   Wound Width (cm) 0.4 cm 08/19/22 1400   Wound Depth (cm) 0.1 cm 08/19/22 1400   Wound Surface Area (cm^2) 0.16 cm^2 08/19/22 1400   Wound Volume (cm^3) 0.016 cm^3 08/19/22 1400   Post-Procedure Length (cm) 0.9 cm 08/12/22 1300   Post-Procedure Width (cm) 0.5 cm 08/12/22 1300   Post-Procedure Depth (cm) 0.1 cm 08/12/22 1300   Post-Procedure Surface Area (cm^2) 0.45 cm^2 08/12/22 1300   Post-Procedure Volume (cm^3) 0.045 cm^3 08/12/22 1300   Tunneling (cm) 0 cm 10/07/22 1400   Undermining (cm) 0 cm 10/07/22 1400   Wound Odor None 10/14/22 1400   Exposed Structures None 10/14/22 1400   Number of days: 84       Wound 07/29/22 Pressure Injury Leg Posterior;Lateral Left (Active)   Wound Image   10/14/22 1400   Site Assessment Purple;Red 10/14/22 1400   Periwound Assessment Dry;Intact 10/14/22 1400   Drainage Amount None 10/14/22 1400   Drainage Description Sanguineous 08/12/22 1300   Treatments Cleansed;Site care 10/14/22 1400   Wound Cleansing Foam Cleanser/Washcloth 10/14/22 1400   Periwound Protectant Barrier Paste 10/14/22 1400   Dressing Cleansing/Solutions Not Applicable 10/14/22 1400   Dressing Options Mepilex;Tubigrip 10/14/22 1400   Dressing Changed Changed 10/14/22 1400   Dressing Change/Treatment Frequency Monday, Wednesday, Friday, and As Needed 10/14/22 1400   WOUND NURSE ONLY - Pressure Injury Stage DTPI 10/14/22 1400   Wound Length (cm) 0.1 cm 08/12/22 1300   Wound Width (cm) 0.1 cm 08/12/22 1300   Wound Depth (cm) 0.1 cm 08/12/22 1300   Wound Surface Area (cm^2) 0.01 cm^2 08/12/22 1300   Wound Volume (cm^3) 0.001 cm^3 08/12/22 1300   Tunneling (cm) 0 cm 09/30/22 1600   Undermining (cm) 0 cm  09/30/22 1600   Wound Odor None 10/14/22 1400   Exposed Structures None 10/14/22 1400   Number of days: 77     PROCEDURE: Excisional debridement of sacrococcygeal wound and left medial leg wound  -2% viscous lidocaine applied topically to wound beds for approximately 5 minutes prior to debridement  -Curette used to debride wound beds.  Excisional debridement was performed to remove devitalized tissue until healthy, bleeding tissue was visualized.  Debridement was carried into the undermined areas of the sacral wound.  Total area debrided approximately 8.98 cm² (including into wound undermining).  Deepest level of debridement was into the muscle / fascial layer.  -Bleeding controlled with manual pressure.    -Wound care completed by wound RN, refer to flowsheet  -Patient tolerated the procedure well, without c/o pain or discomfort.     BIOLOGIC LOG  5/20/2022-first application.  PRODUCT: EpiCord 2 x 3 cm . PRODUCT #[id number]; EXPIRES: 2026-12-01 5/27/2022- second application.  PRODUCT: EpiCordEX 2 x 3 cm . PRODUCT #[id number]; EXPIRES: 2026-09-01    6/3/2022- third application.  PRODUCT: EpiCordEX 2 x 3 cm . PRODUCT #[id number]; EXPIRES: 2026-10-01    6/10/2022- fourth application.  PRODUCT: EpiCordEX 2 x 3 cm . PRODUCT #[id number]; EXPIRES: 2026-09-01 6/17/2022- fifth application.  PRODUCT: EpiCordEX 2 x 3 cm . PRODUCT #[id number]; EXPIRES: 2027-02-01 6/24/2022- sixth application.  PRODUCT: EpiCordEX 2 x 3 cm . PRODUCT #[id number]; EXPIRES: 2027-02-01 07/01/22: Seventh application.  Product: Epicort Dx 2.0 x 3.0 cm reorder number [id number]; product number [id number]; expires 2027-02-01 7/22/2022- eighth application.  PRODUCT: EpiCord 2 x 3 cm, reorder #[id number]. PRODUCT #[id number]; EXPIRES: 2027-02-01      Pertinent Labs and Diagnostics:    Labs:     A1c: No results found for: HBA1C     [facility] results, 7/1/2022 (under media tab)    CRP  13    ESR 31      IMAGING:     10/3/2022-CT of pelvis with contrast  IMPRESSION:     1.  Posterior soft tissue sacral wound and 1.5 x 3.7 cm abscess.   2.  Progressive destruction of the distal sacrum and proximal coccyx. Sclerosis and irregularity of the distal sacrum consistent with osteomyelitis.   3.  Old wound within the soft tissues posterior to the distal left SI joint with possible fistulous communication.    10/3/2022-CT of tib-fib with and without contrast, with reconstruction  IMPRESSION:     1.  Old fractures of the left tibia and fibula with extensive callus formation and bony bridging as well as extensive dystrophic calcifications. Similar to previous.   2.  Distal medial soft tissue wounds with ill-defined superficial in the soft tissue thickening and fluid collections suggestive of phlegmon. No organized abscess identified.   3.  No definite osteomyelitis.   4.  Arthritic changes.        VASCULAR STUDIES: No results found.    LAST  WOUND CULTURE:   Lab Results   Component Value Date/Time    CULTRSULT No growth at 48 hours. 09/22/2022 01:00 PM          ASSESSMENT AND PLAN:     1. Sacral decubitus ulcer, stage IV ([facility])  Comments: Ulcer first noted in early April 2022 as small open area which quickly enlarged.  This ulcer is present distal from previous sacral ulcer which healed after surgery in January.  Patient has history of flap reconstruction x2 to this area.    10/14/2022: No change in size, wound remains stalled.  CT shows progressive destruction of distal sacrum and proximal coccyx.  -Excisional debridement of wound in clinic today, medically necessary to promote wound healing  -Patient is to return to clinic weekly for assessment and debridement   -Home health to continue to change the wound VAC dressing 2 times per week in between clinic visits.  -Referral to Dr. Saini for possible surgical intervention, does not have appointment yet. Was given his clinic information to make  appointment.  -Healing of this wound is further complicated by the patient's multiple comorbidities, exposed bone, and significant pressure from sitting in his wheelchair    Wound care: NPWT at 125 mmHg to accelerate granulation, change 3 times per week.      2.  Postoperative wound dehiscence, subsequent encounter  Comments: On 4/26 patient underwent irrigation and debridement of multiple compartments of the left lower extremity states for pressure injury, with bone excision, and complex closure of chronic wound using biologic skin substitute.  During postop visit in surgeons office on 5/11, surgical site was noted to be dehisced.  Patient was referred to wound clinic for management.    10/14/2022: Wound measuring slightly larger following I&D last week.  -Excisional debridement of entire wound in clinic today, medically necessary to promote wound healing.  -Patient to return to clinic weekly for assessment and debridement  -Home health to change dressing 1-2 times per week in between clinic visits   -Due to deterioration of wound, patient was referred to Dr. Raman at [facility], however does not have appointment yet  -Consider resuming biologic and/or VAC    Wound care: Silver Hydrofiber to manage exudate and bioburden, Mepilex, Tubigrip D to manage edema      3.  Pressure injury of left leg, deep tissue injury  Comments: DTI to posterior left lower leg first observed in clinic 7/29/2022.  Patient does not know how it started, had been wearing heel float boot consistently.    10/14/2022: Remains stable.  Skin still intact  -Continue to monitor for any deterioration in tissue quality  -Patient is currently wearing Prevalon boots to help prevent pressure injuries to bilateral lower extremities    Wound care: Mepilex, Tubigrip D      4.  Quadriplegia, C5-C7, complete (HCC)  Comments: Complicating factor.  Impaired mobility and sensation  -Patient is still spending 7-8 hours/day up in his wheelchair, though he does have  appropriate offloading cushion, and knows to reposition frequently.  Wears heel float boots bilaterally at all times    5. Wound Infection    10/14/2022:   - Patient taking Augmentin for leg infection  - Infection does not appear to be worsening.      Please note that this note may have been created using voice recognition software. I have worked with technical experts from [facility] to optimize the interface.  I have made every reasonable attempt to correct obvious errors, but there may be errors of grammar and possibly content that I did not discover before finalizing the note.

## 2022-10-14 NOTE — PATIENT INSTRUCTIONS
-Keep your wound dressing clean, dry, and intact.    -Wound vac may not have any drainage in tube or cannister & it will still be working.   Change cannister if it does become full by pressing tab on side of machine to remove canister and snap on new one. Full canister can be thrown in the trash. If cannister fills with bright red blood - go to ER. Dressing will be changed every MWF at the wound clinic.  If you are having issues with your wound VAC, please consider patching leaks, changing the canister, or calling [phone] for troubleshooting. If the wound VAC has been off or un-operational for over 2 hours, call wound care center to inform them and remove all dressings including black foam and replace with normal saline damp gauze.     -Should you experience any significant changes in your wound(s), such as infection (redness, swelling, localized heat, increased pain, fever > 101 F, chills) or have any questions regarding your home care instructions, please contact the wound center at [phone]. If after hours, contact your primary care physician or go to the hospital emergency room.

## 2022-10-14 NOTE — CARDIAC REMOTE MONITOR - SCAN
Device transmission reviewed. Device demonstrated appropriate function.       Electronically Signed by: Elías Merino M.D.    10/14/2022  5:01 PM

## 2022-10-17 ENCOUNTER — TELEPHONE (OUTPATIENT)
Dept: CARDIOLOGY | Facility: MEDICAL CENTER | Age: 72
End: 2022-10-17
Payer: MEDICARE

## 2022-10-17 DIAGNOSIS — I48.92 ATRIAL FLUTTER, UNSPECIFIED TYPE (HCC): ICD-10-CM

## 2022-10-17 DIAGNOSIS — I48.92 PAROXYSMAL ATRIAL FLUTTER (HCC): ICD-10-CM

## 2022-10-17 NOTE — TELEPHONE ENCOUNTER
Elías Merino M.D.  You; Faith Rose, Med Ass't 6 minutes ago (10:09 AM)     Would advise resuming Xarelto 20mg

## 2022-10-17 NOTE — TELEPHONE ENCOUNTER
Phone Number Called: [phone]    Call outcome: Spoke to patient regarding message below.    Message: Called to inform patient of device transmission. Patient denies any symptoms at that time. Patient verbalizes understanding on MC recommendations and will resume Xarelto.

## 2022-10-17 NOTE — TELEPHONE ENCOUNTER
Phone Number Called: [phone]    Call outcome: Left detailed message for patient. Informed to call back with any additional questions.    Message: Called to inform patient that I can send him a paper prescription, but that this would need to be signed by  and MC is not in office again until Friday.

## 2022-10-17 NOTE — TELEPHONE ENCOUNTER
MADDIE          Caller: Osvaldo Pate      Topic/issue: Patient was calling to see if a hard copy of the Xarelto medication could be sent to him  Fax# [phone]    Callback Number: [phone]      Thank you    -Ryan AN

## 2022-10-17 NOTE — TELEPHONE ENCOUNTER
Remote Transmission 10/15/2022:    1-AF Episode 10/14/2022@8:30am lasting 4 hours and 6 mins.    Pt not on OAC.    Report scanned into media.

## 2022-10-18 NOTE — TELEPHONE ENCOUNTER
Phone Number Called: [phone]    Call outcome: Spoke to patient regarding message below.    Message: Call received from patient regarding prescription. Patient states that he is good on the prescription, but he did have some symptoms of heart pounding last night. Device transmission checked by Roselia. Patient had a two minute episode of atrial flutter. Patient wants to know if there is anything he can or should take other than the Xarelto. Patient curious if MC knows what has caused the reoccurrence.

## 2022-10-19 NOTE — TELEPHONE ENCOUNTER
Elías Merino M.D.  You 1 hour ago (3:55 PM)     Recommend Xarelto to reduce risk of stroke. He had known history of flutter and declined ablation so the risk of spontaneous recurrence was always there and is likely not due to anything in particular aside from natural disease progression

## 2022-10-19 NOTE — TELEPHONE ENCOUNTER
Phone Number Called: [phone]    Call outcome: Spoke to patient regarding message below.    Message: Called to inform patient of MC recommendations. Patient states he was reading notes on MyChart and that he did not refuse the ablation, but it was cancelled because he was in normal sinus rhythm and he was told he did not need it. Chart review shows ablation was cancelled due to conflicting procedures in 2020. Ablation was never rescheduled. Pt received ILR in 2021 with plan for possible ablation if reoccurrence of Fib/flutter. Patient would like to proceed with possible ablation at this time. Since last seen in 5/2021 scheduled for appointment with MC to discuss ablation. Pt also concerned about being on blood thinners with other medical conditions. Advised MC can talk about alternatives to OAC at that upcoming appointment and plan at that time. Patient verbalizes understanding.

## 2022-10-21 ENCOUNTER — OFFICE VISIT (OUTPATIENT)
Dept: WOUND CARE | Facility: MEDICAL CENTER | Age: 72
End: 2022-10-21
Attending: INTERNAL MEDICINE
Payer: MEDICARE

## 2022-10-21 VITALS
OXYGEN SATURATION: 94 % | RESPIRATION RATE: 18 BRPM | SYSTOLIC BLOOD PRESSURE: 162 MMHG | DIASTOLIC BLOOD PRESSURE: 82 MMHG | HEART RATE: 47 BPM | TEMPERATURE: 98.7 F

## 2022-10-21 DIAGNOSIS — S91.104A OPEN WOUND OF SECOND TOE, RIGHT, INITIAL ENCOUNTER: ICD-10-CM

## 2022-10-21 DIAGNOSIS — T14.8XXA DEEP TISSUE INJURY: ICD-10-CM

## 2022-10-21 DIAGNOSIS — T81.31XD POSTOPERATIVE WOUND DEHISCENCE, SUBSEQUENT ENCOUNTER: ICD-10-CM

## 2022-10-21 DIAGNOSIS — G82.53 QUADRIPLEGIA, C5-C7, COMPLETE (HCC): ICD-10-CM

## 2022-10-21 DIAGNOSIS — S91.209A AVULSION OF TOENAIL, INITIAL ENCOUNTER: ICD-10-CM

## 2022-10-21 DIAGNOSIS — L89.154 SACRAL DECUBITUS ULCER, STAGE IV (HCC): ICD-10-CM

## 2022-10-21 DIAGNOSIS — L91.8 SKIN TAG: ICD-10-CM

## 2022-10-21 DIAGNOSIS — L89.622 PRESSURE INJURY OF LEFT HEEL, STAGE 2 (HCC): ICD-10-CM

## 2022-10-21 DIAGNOSIS — L89.890 PRESSURE INJURY OF LEFT LEG, UNSTAGEABLE (HCC): ICD-10-CM

## 2022-10-21 PROCEDURE — 11730 AVULSION NAIL PLATE SIMPLE 1: CPT | Mod: 59 | Performed by: STUDENT IN AN ORGANIZED HEALTH CARE EDUCATION/TRAINING PROGRAM

## 2022-10-21 PROCEDURE — 11200 RMVL SKIN TAGS UP TO&INC 15: CPT | Mod: 59 | Performed by: STUDENT IN AN ORGANIZED HEALTH CARE EDUCATION/TRAINING PROGRAM

## 2022-10-21 PROCEDURE — 11730 AVULSION NAIL PLATE SIMPLE 1: CPT

## 2022-10-21 PROCEDURE — 11044 DBRDMT BONE 1ST 20 SQ CM/<: CPT | Mod: XS

## 2022-10-21 PROCEDURE — 11200 RMVL SKIN TAGS UP TO&INC 15: CPT

## 2022-10-21 PROCEDURE — 11044 DBRDMT BONE 1ST 20 SQ CM/<: CPT | Performed by: STUDENT IN AN ORGANIZED HEALTH CARE EDUCATION/TRAINING PROGRAM

## 2022-10-21 NOTE — PROGRESS NOTES
Provider Encounter- Pressure Injury        HISTORY OF PRESENT ILLNESS  Wound History:    START OF CARE IN CLINIC: 5/3/2022    REFERRING PROVIDER: Sahara Mendez      WOUNDS- Pressure Injury, Sacrococcygeal, stage IV                                                             Surgical wound dehiscence, left medial lower leg-status post surgical I&D of stage IV pressure injury and           biologic application                    Pressure injury, DTI, left posterior lower leg-first observed in clinic 7/29/2022       Pressure injury stage II L heel - first noted 10/21     Right 2nd toe avulsion - first noted 10/21     Skin tag left posterior ear - first noted 10/21     HISTORY: Patient with history of incomplete quadriplegia referred to [facility] for treatment of a stage IV pressure injury.  He has a history of previous pressure injuries to this area, and underwent muscle flaps in 2019, and then again in 2020.  He was seen in the wound clinic in November 2021 for an ulcer proximal from his current ulcer, and pressure injuries to his left posterior lower leg and left heel.  At that time, it was discovered that the patient had retained VAC foam embedded in the wound bed of the sacral wound.  Attempts were made to get him back to his plastic surgeon, though unsuccessful.  In January he underwent surgical removal of VAC sponge along with excisional debridement of his sacral wound by Dr. Chaves.  After the surgery, his wound went on to heal without incident.   In early April 2022, his home health nurse noted a new sacral ulcer, below the previous ulcer which quickly tripled in size over the following weeks.  The ulcer to his left medial lower leg had also deteriorated, with bone visible at the base..  He was hospitalized from 4/22 until 4/27/2022 and underwent surgery with Dr. Raman on 4/26 for irrigation and debridement of multiple compartments of the left lower extremity, bone excision, and complex closure of chronic wound  using biologic skin substitute.   His sacrococcygeal wound was not surgically addressed during this admission.  He was discharged back to his group home, with home health, and referral to outpatient wound clinic for his sacral wound.  He was instructed to follow-up with his surgeon for his lower leg wound.       Postoperatively, the left medial lower leg incision dehisced.  He was seen by his surgeon at [facility] on 5/11.  The surgeon opted to leave remaining sutures in place, and refer him to the wound clinic for treatment of this wound.   Treatment of this wound was initiated in clinic on 5/12.  During this visit was also noted that his heel DTI had resolved, but that he had a new pressure injury to his left posterior lower extremity.     A new pressure injury was noted to patient's right upper buttock/lower back on 5/20/2022.  Wound was linear in shape, skin discolored but intact.     Abrasion noted to left anterior lower leg.  First observed in clinic on 7/22/2022.  Patient states he bumped his leg into his food tray.     Small DTI noted to patient's left lateral lower leg on 7/29/2022.  Skin intact but discolored.     Large area of deep tissue injury noted to patient's left exterior lower leg.  Patient denied any trauma to this area.  Skin intact.  Wound documented.    Pertinent Medical History: Incomplete quadriplegia, history of stage IV pressure injuries, history of flap procedures to sacral pressure injuries, osteomyelitis, obesity, colostomy in place   Contributing factors: Immobility and Obesity, impaired sensation    Personal support: Attendant-staff at assisted and home health nursing    TOBACCO USE:   Former smoker, quit in 1977.  Never used smokeless tobacco    Patient's problem list, allergies, and current medications reviewed and updated in Epic    Interval History:  Interval History thinned 7/29/2022.  Please see previous notes for complete interval history.     7/29/2022 : Clinic visit with Kelly  ADILSON Sandy, HOLDEN, IMAN, CFCN.  Anjum states he continues to do well.  He was able to go to [employer] yesterday, spent today [place].  His left lower extremity wound has deteriorated, presents today with significant odor and deterioration of tissue.  VAC held from this wound today after debridement.  I also did not apply biologic today.   Sacral wound about the same in area, though some evidence of increasing granulation.  No evidence of infection.  Small abrasion to his left lower leg appears to be improving.  He has a new small DTI to his left lateral lower leg, skin intact but discolored.    8/5/2022 : Clinic visit with ADILSON Arthur, HOLDEN, ALEXN, CFCN.  Anjum continues to feel well.  His left lower wound has improved with VAC hold, will discontinue from this wound altogether.  I did not apply biologic to this wound today given its current improvement.  DTI to posterior left leg is a bit darker today, skin still intact.  Patient states he has been wearing his heel float boot at all times.  Sacral wound with increased granulation, slight decrease in depth, bone still exposed.    8/12/2022 : Clinic visit with ADILSON Arthur, HOLDEN, IMAN, CFTRACI.   Anjum states he is feeling well.  His left lower leg wound continues to improve without the use of VAC or biologic.  The deep tissue injury to his posterior leg is gradually improving, area decreasing.  The abrasion to his left shin is now open, was covered with scab last week.  Sacral wound improving slowly, increase granulation, slight decrease in area.  Home health had messaged that they were concerned for infection due to odor.  I did not appreciate any significant odor today.  We did add Puracyn gel to the wound bed prior to reapplying VAC.  Anjum continues to spend most of his day up in his wheelchair, though tries to reposition frequently, and is wearing heel float boots bilaterally at all times.    8/19/2022 : Clinic visit with ADILSON Arthur,  HOLDEN, IMAN, LILIYA.   Turk states he is in his usual state of health, feeling well overall.  All of his wounds are progressing, though very slowly.  He continues to spend most of the day up in his wheelchair.  He does know to shift his weight frequently, and has an appropriate pressure relief cushion in his chair.    He was seen by [facility] earlier this week, for complaints of leakage around his suprapubic catheter and fever.  He was prescribed Keflex, and his catheter was changed    8/26/2022: Clinic visit with ADILSON Flores.  Patient reports overall he is feeling well denies any fever or chills.  Patient reports that he is continuously wearing Prevalon boots to offload bilateral lower extremities.  The left medial lower extremity wound is quite boggy with a small amount of turbulent fluid which was expressed with minimal palpation to the periwound area.  There is no foul-smelling odor emanating from the wound bed there is no erythema or edema.    9/2/2022 : Clinic visit with ADILSON Arthur, HOLDEN, IMAN, LILIYA.   States he is feeling well overall, denies fevers, chills, nausea, vomiting, cough or shortness of breath.  Unfortunately, his wounds are not progressing significantly.  Leg wound presents with boggy tissue, and probes to bone.  Area and depth of sacral wound have not changed significantly, bone still exposed.  Previously has been seen by several different plastic surgeons, including Dr. Rodriguez, Dr. Nicolas,  Dr. Mott, Dr. Chaves, he was also referred to Dr. Browne at [facility].  None of these surgeons have agreed to see him thus far.    9/9/2022: Clinic visit with Steve Hodges MD. Patient reports doing ok. Denies any changes to health or symptoms of infection. Wounds are not progressing, leg wound can be probed to bone and tissue is boggy. Wound VAC to sacrum with bone still exposed. He reports previously being treated for OM for 6 weeks without resolution. Discussed possibly getting  imaging to eval for OM, however with chronic OM there is limited to no role for prolonged Abx therapy per IDSA guidelines.    9/16/2022: Clinic visit with Steve Hodges MD. Patient reports feeling his normal state of health, denies any fevers or chills. His medial left leg wound is boggy and I was able to express some tan fluid concerning for purulence. Will take wound culture and order abx as appropriate for the results. Will not start empirical Abx as no evidence of cellulitis and his history of multiple rounds of Abx in the past. Will order CT scan of lower extremity and sacrum to further evaluate areas. Sacrum still to bone, had previous history of some kind of retained material in wound bed, will obtain the CT scan to evaluate.    9/23/2022: Clinic visit with Steve Hodges MD. Patient reports doing ok, denies any fevers or chills. Patient is taking Augmentin, reports tolerating well without any side effects. Tissue quality leg wound remains poor, still with some purulence able to be expressed but less than last week. Patient has CT scan planned for 10/3 and had labs today to ensure adequate renal function. Sacrum measuring smaller, with larger undermining.    9/30/2022 : Clinic visit with ADILSON Arthur, HOLDEN, IMAN, LILIYA.   Anjum states that he is feeling well.  He is still taking Augmentin, reports no adverse effects.  Purulent drainage still noted from lower leg wound, with boggy, dusky tissue in the wound bed.  We will extend Augmentin and additional 2 weeks.   Opening of sacral wound has decreased since last assessment, increase granulation within the wound bed noted, however bone still exposed.  His CT is scheduled for Monday 10/3, of both sacrum and his lower leg.    10/7/2022 : Clinic visit with ADILSON Arthur, HOLDEN, IMAN, LILIYA.   Anjum states he is feeling well, offers no complaints.  CT results discussed with him in clinic today, as well as recommendations from interdisciplinary rounds  a few days ago.  He was agreeable to allowing for today I&D of his leg wound today.  Will place referral to Dr. Saini for consideration of plastic intervention.  Patient states that if he were to undergo a flap procedure, he would be willing to be admitted to a skilled nursing facility long enough to allow for healing.   By removed a small chip of bone from leg wound during I&D.  Tissue is dusky and friable.  Will refer patient back to Dr. Raman, orthopedics, for reevaluation.    10/14/2022: Clinic visit with Steve Hodges MD. Patient reports doing well, denies any fevers or chills. Patient reports that his home health nurse feels leg wound improving, appears unchanged and probes to bone. CT scan of leg without definitive evidence of OM, was referred back to orthopedics with Dr. Raman but does not have appointment yet. He was also referred to Dr. Saini, does not have appointment.    10/21/2022: Clinic visit with Steve Hodges MD. Patient reports doing well, denies any symptoms of infection. He presents with left posterior ear skin tag and asked if I could address in clinic as it causes him discomfort when wearing mask. No significant change to left lower extremity wound or sacral wound. He does report having appointment with Dr. Raman 11/14 and with Dr. Saini sometime in November. Patient was found to have near complete avulsion to right 2nd toenail, he does not recall any specific trauma but is insensate. He was also noted to have new stage II pressure injury left heel despite wearing prevalon boots. Counseled to continue offloading heel as much as possible, can place pillow under leg to completely offload heel.     REVIEW OF SYSTEMS:   Unchanged from previous clinic visit on 10/14/2022    PHYSICAL EXAMINATION:   BP (!) 162/82 (BP Location: Left arm, Patient Position: Supine) Comment: RN notified  Pulse (!) 47   Temp 37.1 °C (98.7 °F) (Temporal)   Resp 18   SpO2 94%   Physical Exam  Constitutional:        Appearance: He is obese.   Cardiovascular:      Rate and Rhythm: Bradycardia present.   Pulmonary:      Effort: Pulmonary effort is normal. No respiratory distress.      Breath sounds: No wheezing.   Skin:     Comments: Stage IV coccygeal pressure injury - Largely unchanged with exposed bone  Full-thickness wound to left medial lower leg - Measuring larger after last weeks I&D, tissue remains boggy and friable. Continues to probe to bone.    See flow sheet for details   Neurological:      Mental Status: He is alert and oriented to person, place, and time.       WOUND ASSESSMENT  Wound 04/11/22 Full Thickness Wound Leg Medial Left --Left Medial Lower Leg (Active)   Wound Image    10/21/22 1400   Site Assessment Red;Purple;Boggy 10/21/22 1400   Periwound Assessment Edema;Fragile 10/21/22 1400   Margins Attached edges 10/21/22 1400   Drainage Amount Large 10/21/22 1400   Drainage Description Serosanguineous 10/21/22 1400   Treatments Cleansed;Provider debridement;Site care 10/21/22 1400   Wound Cleansing Puracyn [place] 10/21/22 1400   Periwound Protectant Skin Protectant Wipes to Periwound;Barrier Paste 10/21/22 1400   Dressing Cleansing/Solutions Not Applicable 10/21/22 1400   Dressing Options Hydrofiber Silver;Mepilex;Tubigrip 10/21/22 1400   Dressing Changed Changed 10/21/22 1400   Dressing Change/Treatment Frequency Monday, Wednesday, Friday, and As Needed 10/21/22 1400   Non-staged Wound Description Full thickness 10/21/22 1400   Wound Length (cm) 2.5 cm 10/21/22 1400   Wound Width (cm) 2.5 cm 10/21/22 1400   Wound Depth (cm) 1 cm 10/21/22 1400   Wound Surface Area (cm^2) 6.25 cm^2 10/21/22 1400   Wound Volume (cm^3) 6.25 cm^3 10/21/22 1400   Post-Procedure Length (cm) 2.5 cm 10/21/22 1400   Post-Procedure Width (cm) 2.5 cm 10/21/22 1400   Post-Procedure Depth (cm) 1.2 cm 10/21/22 1400   Post-Procedure Surface Area (cm^2) 6.25 cm^2 10/21/22 1400   Post-Procedure Volume (cm^3) 7.5 cm^3 10/21/22 1400   Wound  Healing % -91999 10/21/22 1400   Tunneling (cm) 0 cm 10/21/22 1400   Tunneling Clock Position of Wound 12 07/22/22 1400   Undermining (cm) 0 cm 10/21/22 1400   Undermining of Wound, 1st Location From 2 o'clock;To 4 o'clock 06/03/22 1300   Wound Odor None 10/21/22 1400   Exposed Structures Bone 10/21/22 1400   Number of days: 193       Wound 04/22/22 Pressure Injury Sacrum POA stage 4 (Active)   Wound Image    10/21/22 1400   Site Assessment Red;Yellow;Other (Comment) 10/21/22 1400   Periwound Assessment Scar tissue;Fragile 10/21/22 1400   Margins Unattached edges 10/21/22 1400   Drainage Amount Moderate 10/21/22 1400   Drainage Description Serosanguineous 10/21/22 1400   Treatments Cleansed;Provider debridement;Site care 10/21/22 1400   Wound Cleansing Puracyn [place] 10/21/22 1400   Periwound Protectant Benzoin;Paste Ring;Drape 10/21/22 1400   Dressing Cleansing/Solutions Not Applicable 10/21/22 1400   Dressing Options Wound Vac;Mepilex 10/21/22 1400   Dressing Changed Changed 10/21/22 1400   Dressing Change/Treatment Frequency Monday, Wednesday, Friday, and As Needed 10/21/22 1400   WOUND NURSE ONLY - Pressure Injury Stage 4 10/21/22 1400   Wound Length (cm) 1.6 cm 10/21/22 1400   Wound Width (cm) 2.1 cm 10/21/22 1400   Wound Depth (cm) 1.7 cm 10/21/22 1400   Wound Surface Area (cm^2) 3.36 cm^2 10/21/22 1400   Wound Volume (cm^3) 5.712 cm^3 10/21/22 1400   Post-Procedure Length (cm) 1.6 cm 10/21/22 1400   Post-Procedure Width (cm) 2.5 cm 10/21/22 1400   Post-Procedure Depth (cm) 1.8 cm 10/21/22 1400   Post-Procedure Surface Area (cm^2) 4 cm^2 10/21/22 1400   Post-Procedure Volume (cm^3) 7.2 cm^3 10/21/22 1400   Wound Healing % -138 10/21/22 1400   Tunneling (cm) 2.2 cm 10/21/22 1400   Tunneling Clock Position of Wound 9 10/21/22 1400   Undermining (cm) 2.5 cm 10/21/22 1400   Undermining of Wound, 1st Location From 6 o'clock;To 1 o'clock 10/21/22 1400   Undermining (cm) - 2nd location 3 cm 07/15/22 1400   Undermining  of Wound, 2nd Location From 9 o'clock;To 11 o'clock 07/15/22 1400   Undermining (cm) - 3rd location 3.2 cm 06/24/22 1000   Undermining of Wound, 3rd Location From 7 o'clock;To 11 o'clock 06/24/22 1000   Wound Odor None 10/21/22 1400   Exposed Structures Bone 10/21/22 1400   Number of days: 182       Wound 07/22/22 Traumatic Leg Anterior Left (Active)   Wound Image   10/14/22 1400   Site Assessment Purple;Other (Comment) 10/21/22 1400   Periwound Assessment Dry;Intact 10/21/22 1400   Margins Attached edges 08/19/22 1400   Drainage Amount None 10/21/22 1400   Drainage Description Serosanguineous 08/19/22 1400   Treatments Cleansed 10/21/22 1400   Wound Cleansing Foam Cleanser/Washcloth 10/21/22 1400   Periwound Protectant Skin Protectant Wipes to Periwound 10/21/22 1400   Dressing Cleansing/Solutions Not Applicable 10/21/22 1400   Dressing Options Silicone Adhesive Foam;Tubigrip 10/21/22 1400   Dressing Changed Changed 10/21/22 1400   Dressing Change/Treatment Frequency Monday, Wednesday, Friday, and As Needed 10/21/22 1400   WOUND NURSE ONLY - Pressure Injury Stage DTPI 10/21/22 1400   Non-staged Wound Description Full thickness 08/12/22 1300   Wound Length (cm) 0.4 cm 08/19/22 1400   Wound Width (cm) 0.4 cm 08/19/22 1400   Wound Depth (cm) 0.1 cm 08/19/22 1400   Wound Surface Area (cm^2) 0.16 cm^2 08/19/22 1400   Wound Volume (cm^3) 0.016 cm^3 08/19/22 1400   Post-Procedure Length (cm) 0.9 cm 08/12/22 1300   Post-Procedure Width (cm) 0.5 cm 08/12/22 1300   Post-Procedure Depth (cm) 0.1 cm 08/12/22 1300   Post-Procedure Surface Area (cm^2) 0.45 cm^2 08/12/22 1300   Post-Procedure Volume (cm^3) 0.045 cm^3 08/12/22 1300   Tunneling (cm) 0 cm 10/07/22 1400   Undermining (cm) 0 cm 10/07/22 1400   Wound Odor None 10/21/22 1400   Exposed Structures None 10/21/22 1400   Number of days: 91       Wound 07/29/22 Pressure Injury Leg Posterior;Lateral Left (Active)   Wound Image   10/14/22 1400   Site Assessment Purple 10/21/22  1400   Periwound Assessment Dry;Intact 10/21/22 1400   Drainage Amount None 10/21/22 1400   Drainage Description Sanguineous 08/12/22 1300   Treatments Cleansed 10/21/22 1400   Wound Cleansing Foam Cleanser/Washcloth 10/21/22 1400   Periwound Protectant Skin Protectant Wipes to Periwound 10/21/22 1400   Dressing Cleansing/Solutions Not Applicable 10/21/22 1400   Dressing Options Mepilex 10/21/22 1400   Dressing Changed Changed 10/21/22 1400   Dressing Change/Treatment Frequency Monday, Wednesday, Friday, and As Needed 10/21/22 1400   WOUND NURSE ONLY - Pressure Injury Stage DTPI 10/21/22 1400   Wound Length (cm) 0.1 cm 08/12/22 1300   Wound Width (cm) 0.1 cm 08/12/22 1300   Wound Depth (cm) 0.1 cm 08/12/22 1300   Wound Surface Area (cm^2) 0.01 cm^2 08/12/22 1300   Wound Volume (cm^3) 0.001 cm^3 08/12/22 1300   Tunneling (cm) 0 cm 09/30/22 1600   Undermining (cm) 0 cm 09/30/22 1600   Wound Odor None 10/21/22 1400   Exposed Structures None 10/21/22 1400   Number of days: 84       Wound 10/21/22 Right 2nd Toe Nailbed (Active)   Wound Image    10/21/22 1400   Site Assessment Red;Other (Comment) 10/21/22 1400   Periwound Assessment Edema 10/21/22 1400   Margins Attached edges 10/21/22 1400   Drainage Amount Small 10/21/22 1400   Drainage Description Serosanguineous 10/21/22 1400   Treatments Cleansed;Provider debridement;Site care 10/21/22 1400   Wound Cleansing Puracyn [place] 10/21/22 1400   Periwound Protectant Skin Protectant Wipes to Periwound;Barrier Paste 10/21/22 1400   Dressing Cleansing/Solutions Not Applicable 10/21/22 1400   Dressing Options Hydrofiber Silver;Nonadhesive Foam;Hypafix Tape 10/21/22 1400   Dressing Changed New 10/21/22 1400   Dressing Change/Treatment Frequency Monday, Wednesday, Friday, and As Needed 10/21/22 1400   Non-staged Wound Description Full thickness 10/21/22 1400   Wound Length (cm) 0.7 cm 10/21/22 1400   Wound Width (cm) 1 cm 10/21/22 1400   Wound Depth (cm) 0.5 cm 10/21/22 1400    Wound Surface Area (cm^2) 0.7 cm^2 10/21/22 1400   Wound Volume (cm^3) 0.35 cm^3 10/21/22 1400   Post-Procedure Length (cm) 1.1 cm 10/21/22 1400   Post-Procedure Width (cm) 1.1 cm 10/21/22 1400   Post-Procedure Depth (cm) 0.5 cm 10/21/22 1400   Post-Procedure Surface Area (cm^2) 1.21 cm^2 10/21/22 1400   Post-Procedure Volume (cm^3) 0.605 cm^3 10/21/22 1400   Tunneling (cm) 0 cm 10/21/22 1400   Undermining (cm) 0 cm 10/21/22 1400   Wound Odor None 10/21/22 1400   Exposed Structures None 10/21/22 1400   Number of days: 0       Wound 10/21/22 Heel Left Posterior Heel (Active)   Wound Image   10/21/22 1400   Site Assessment Red 10/21/22 1400   Periwound Assessment Fragile;Scar tissue 10/21/22 1400   Margins Attached edges 10/21/22 1400   Drainage Amount KEN 10/21/22 1400   Treatments Cleansed;Site care 10/21/22 1400   Wound Cleansing Puracyn [place] 10/21/22 1400   Periwound Protectant Skin Protectant Wipes to Periwound;Barrier Paste 10/21/22 1400   Dressing Cleansing/Solutions Not Applicable 10/21/22 1400   Dressing Options Hydrofiber Silver;Mepilex Heel;Tubigrip 10/21/22 1400   Dressing Changed New 10/21/22 1400   Dressing Change/Treatment Frequency Monday, Wednesday, Friday, and As Needed 10/21/22 1400   WOUND NURSE ONLY - Pressure Injury Stage 2 10/21/22 1400   Wound Length (cm) 0.5 cm 10/21/22 1400   Wound Width (cm) 0.3 cm 10/21/22 1400   Wound Depth (cm) 0.1 cm 10/21/22 1400   Wound Surface Area (cm^2) 0.15 cm^2 10/21/22 1400   Wound Volume (cm^3) 0.015 cm^3 10/21/22 1400   Tunneling (cm) 0 cm 10/21/22 1400   Undermining (cm) 0 cm 10/21/22 1400   Wound Odor None 10/21/22 1400   Exposed Structures None 10/21/22 1400   Number of days: 0     PROCEDURE: Excisional debridement of sacrococcygeal wound and left medial leg wound  -Declined lidocaine as he is insensate  -Curette used to debride wound beds.  Excisional debridement was performed to remove devitalized tissue until healthy, bleeding tissue was visualized.   Debridement was carried into the undermined areas of the sacral wound.  Total area debrided approximately 10.25 cm² (including into wound undermining).  Deepest level of debridement was to bone.  -Bleeding controlled with manual pressure.    -Wound care completed by wound RN, refer to flowsheet  -Patient tolerated the procedure well, without c/o pain or discomfort.     PROCEDURE: Removal of skin tag left posterior ear  - Patient reports skin tag that is uncomfortable when wearing mask  - Used forceps and scissors to excise skin tag  - No bleeding or pain    PROCEDURE: Avulsion of right second toenail  - Right 2nd toe traumatic partial avulsion  - Declined lidocaine due to being insensate  - Used forceps and scissors to avulse nail  - Wound care by nurse        BIOLOGIC LOG  5/20/2022-first application.  PRODUCT: EpiCord 2 x 3 cm . PRODUCT #[id number]; EXPIRES: 2026-12-01 5/27/2022- second application.  PRODUCT: EpiCordEX 2 x 3 cm . PRODUCT #[id number]; EXPIRES: 2026-09-01    6/3/2022- third application.  PRODUCT: EpiCordEX 2 x 3 cm . PRODUCT #[id number]; EXPIRES: 2026-10-01    6/10/2022- fourth application.  PRODUCT: EpiCordEX 2 x 3 cm . PRODUCT #[id number]; EXPIRES: 2026-09-01 6/17/2022- fifth application.  PRODUCT: EpiCordEX 2 x 3 cm . PRODUCT #[id number]; EXPIRES: 2027-02-01 6/24/2022- sixth application.  PRODUCT: EpiCordEX 2 x 3 cm . PRODUCT #[id number]; EXPIRES: 2027-02-01 07/01/22: Seventh application.  Product: Epicort Dx 2.0 x 3.0 cm reorder number [id number]; product number [id number]; expires 2027-02-01 7/22/2022- eighth application.  PRODUCT: EpiCord 2 x 3 cm, reorder #[id number]. PRODUCT #[id number]; EXPIRES: 2027-02-01      Pertinent Labs and Diagnostics:    Labs:     A1c: No results found for: HBA1C     [facility] results, 7/1/2022 (under media tab)    CRP 13    ESR 31      IMAGING:     10/3/2022-CT of pelvis with  contrast  IMPRESSION:     1.  Posterior soft tissue sacral wound and 1.5 x 3.7 cm abscess.   2.  Progressive destruction of the distal sacrum and proximal coccyx. Sclerosis and irregularity of the distal sacrum consistent with osteomyelitis.   3.  Old wound within the soft tissues posterior to the distal left SI joint with possible fistulous communication.    10/3/2022-CT of tib-fib with and without contrast, with reconstruction  IMPRESSION:     1.  Old fractures of the left tibia and fibula with extensive callus formation and bony bridging as well as extensive dystrophic calcifications. Similar to previous.   2.  Distal medial soft tissue wounds with ill-defined superficial in the soft tissue thickening and fluid collections suggestive of phlegmon. No organized abscess identified.   3.  No definite osteomyelitis.   4.  Arthritic changes.        VASCULAR STUDIES: No results found.    LAST  WOUND CULTURE:   Lab Results   Component Value Date/Time    CULTRSULT No growth at 48 hours. 09/22/2022 01:00 PM          ASSESSMENT AND PLAN:     1. Sacral decubitus ulcer, stage IV (Former[facility])  Comments: Ulcer first noted in early April 2022 as small open area which quickly enlarged.  This ulcer is present distal from previous sacral ulcer which healed after surgery in January.  Patient has history of flap reconstruction x2 to this area.    10/21/2022: No change in size, wound remains stalled.  CT shows progressive destruction of distal sacrum and proximal coccyx.  -Excisional debridement of wound in clinic today, medically necessary to promote wound healing  -Patient is to return to clinic weekly for assessment and debridement   -Home health to continue to change the wound VAC dressing 2 times per week in between clinic visits.  -Referral to Dr. Saini for possible surgical intervention, reports he has appointment in November but does not recall what date  -Healing of this wound is further complicated by the patient's multiple  comorbidities, exposed bone, and significant pressure from sitting in his wheelchair    Wound care: NPWT at -125 mmHg to accelerate granulation, change 3 times per week.    2. Postoperative wound dehiscence, subsequent encounter  Comments: On 4/26 patient underwent irrigation and debridement of multiple compartments of the left lower extremity states for pressure injury, with bone excision, and complex closure of chronic wound using biologic skin substitute.  During postop visit in surgeons office on 5/11, surgical site was noted to be dehisced.  Patient was referred to wound clinic for management.    10/21/2022: Wound measuring slightly larger, poor tissue quality with serosanguinous drainage from wound  -Patient to return to clinic weekly for assessment and debridement  -Home health to change dressing 1-2 times per week in between clinic visits   -Due to deterioration of wound, patient was referred to Dr. Raman at [facility] reports appointment on 11/14.  -Consider resuming biologic and/or VAC    Wound care: Silver Hydrofiber to manage exudate and bioburden, Mepilex, Tubigrip D to manage edema    3. Deep tissue injury  4. Pressure injury of left leg, unstageable (HCC)  Comments: DTI to posterior left lower leg first observed in clinic 7/29/2022.  Patient does not know how it started, had been wearing heel float boot consistently.    10/14/2022: Remains stable.  Skin still intact  -Continue to monitor for any deterioration in tissue quality  -Patient is currently wearing Prevalon boots to help prevent pressure injuries to bilateral lower extremities    Wound care: Mepilex, Tubigrip D    5. Pressure injury of left heel, stage 2 (HCC)  Comments: First noted in clinic on 10/21/2022    10/21/2022  - Patient did not know about wound  - He reports that he continues to wear prevalon boots  - Counseled on offloading  - Recommend using pillow under calf to offload heel while in bed  - No debridement required today   Wound Care:  Hydrofiber silver, Mepilex    6. Open wound of second toe, right, initial encounter  7. Avulsion of toenail, initial encounter  Comments: First noted in clinic on 10/21/2022    10/21/2022  - Reports home health nurse noted near complete traumatic amputation of left second toenail  - He does not recall any traumatic event, but is insensate  - Toenail avulsed using forceps and scissors  - Wound on nail bed appears to probe to bone  - Initiate wound care today  - Can consider Xray if does not heal to evaluate for OM   Wound care: Hydrofiber silver, foam, tape    8. Skin tag    10/21/2022  - Noted small skin tag left posterior ear. Reports some discomfort when wearing masks  - Uses scissors to excise skin tag  - No bleeding    9. Quadriplegia, C5-C7, complete (HCC)  Comments: Complicating factor.  Impaired mobility and sensation  -Patient is still spending 7-8 hours/day up in his wheelchair, though he does have appropriate offloading cushion, and knows to reposition frequently.  Wears heel float boots bilaterally at all times      Please note that this note may have been created using voice recognition software. I have worked with technical experts from [employer] to optimize the interface.  I have made every reasonable attempt to correct obvious errors, but there may be errors of grammar and possibly content that I did not discover before finalizing the note.

## 2022-10-21 NOTE — PROCEDURES
VAC dressing removed by CNA. Wound bed inspected and no residual foam noted. Wound VAC changed with 1 black foam. Restarted at 125 mmHg continuous. No leak noted.

## 2022-10-22 NOTE — PATIENT INSTRUCTIONS
Avoid prolonged standing or sitting without elevating your legs.  - Apply tubigrip to your legs ending 2 fingers below back of knee without wrinkles.     Wound VAC at 125mmHg continuous with 1 black foam. Dressing will be changed every MWF at the wound clinic or with your home health nurse.  If you are having issues with your wound VAC, please consider patching leaks, changing the canister, or calling [phone] for troubleshooting. If the wound VAC has been off or un-operational for over 2 hours, call wound care center to inform them and remove all dressings including black foam and replace with normal saline damp gauze.     Should you experience any significant changes in your wound(s), such as infection (redness, swelling, localized heat, increased pain, fever > 101 F, chills) or have any questions regarding your home care instructions, please contact the wound center at [phone]. If after hours, contact your primary care physician or go to the hospital emergency room.   Keep dressing clean, dry and cover when bathing. Only change dressing if it's over saturated, soiled or falls off.

## 2022-10-25 ENCOUNTER — OFFICE VISIT (OUTPATIENT)
Dept: CARDIOLOGY | Facility: MEDICAL CENTER | Age: 72
End: 2022-10-25
Payer: MEDICARE

## 2022-10-25 ENCOUNTER — TELEPHONE (OUTPATIENT)
Dept: CARDIOLOGY | Facility: MEDICAL CENTER | Age: 72
End: 2022-10-25

## 2022-10-25 VITALS
RESPIRATION RATE: 14 BRPM | HEART RATE: 60 BPM | DIASTOLIC BLOOD PRESSURE: 76 MMHG | OXYGEN SATURATION: 93 % | BODY MASS INDEX: 32.21 KG/M2 | WEIGHT: 225 LBS | SYSTOLIC BLOOD PRESSURE: 118 MMHG | HEIGHT: 70 IN

## 2022-10-25 DIAGNOSIS — Z95.818 STATUS POST PLACEMENT OF IMPLANTABLE LOOP RECORDER: ICD-10-CM

## 2022-10-25 DIAGNOSIS — I48.4 ATYPICAL ATRIAL FLUTTER (HCC): ICD-10-CM

## 2022-10-25 DIAGNOSIS — I48.0 PAROXYSMAL ATRIAL FIBRILLATION (HCC): ICD-10-CM

## 2022-10-25 PROCEDURE — 93291 INTERROG DEV EVAL SCRMS IP: CPT | Performed by: INTERNAL MEDICINE

## 2022-10-25 PROCEDURE — 93000 ELECTROCARDIOGRAM COMPLETE: CPT | Mod: 59 | Performed by: INTERNAL MEDICINE

## 2022-10-25 PROCEDURE — 99215 OFFICE O/P EST HI 40 MIN: CPT | Performed by: INTERNAL MEDICINE

## 2022-10-25 ASSESSMENT — FIBROSIS 4 INDEX: FIB4 SCORE: 2.76

## 2022-10-25 NOTE — TELEPHONE ENCOUNTER
----- Message from Elías Merino M.D. sent at 10/25/2022  2:53 PM PDT -----  Please schedule chava Clayton meds to hold
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
## 2022-10-26 NOTE — TELEPHONE ENCOUNTER
Patient scheduled for watchman w/RICHARD on 11-22-22 with Dr. Merino. Patient has been instructed to check in at 12:00 for 2:00 case time. Message sent to authorizations. [employer] rep aware of this date.

## 2022-10-29 ENCOUNTER — NON-PROVIDER VISIT (OUTPATIENT)
Dept: CARDIOLOGY | Facility: MEDICAL CENTER | Age: 72
End: 2022-10-29
Payer: MEDICARE

## 2022-11-01 NOTE — CARDIAC REMOTE MONITOR - SCAN
Device transmission reviewed. Device demonstrated appropriate function.       Electronically Signed by: Lex Skinner M.D.    11/1/2022  12:13 PM

## 2022-11-04 ENCOUNTER — OFFICE VISIT (OUTPATIENT)
Dept: WOUND CARE | Facility: MEDICAL CENTER | Age: 72
End: 2022-11-04
Attending: INTERNAL MEDICINE
Payer: MEDICARE

## 2022-11-04 VITALS
SYSTOLIC BLOOD PRESSURE: 129 MMHG | RESPIRATION RATE: 18 BRPM | HEART RATE: 88 BPM | TEMPERATURE: 97.5 F | OXYGEN SATURATION: 93 % | DIASTOLIC BLOOD PRESSURE: 75 MMHG

## 2022-11-04 DIAGNOSIS — L89.890 PRESSURE INJURY OF LEFT LEG, UNSTAGEABLE (HCC): ICD-10-CM

## 2022-11-04 DIAGNOSIS — L89.154 SACRAL DECUBITUS ULCER, STAGE IV (HCC): ICD-10-CM

## 2022-11-04 DIAGNOSIS — T14.8XXA DEEP TISSUE INJURY: ICD-10-CM

## 2022-11-04 DIAGNOSIS — T81.31XD POSTOPERATIVE WOUND DEHISCENCE, SUBSEQUENT ENCOUNTER: ICD-10-CM

## 2022-11-04 DIAGNOSIS — L89.623 PRESSURE INJURY OF LEFT HEEL, STAGE 3 (HCC): ICD-10-CM

## 2022-11-04 DIAGNOSIS — S91.104A OPEN WOUND OF SECOND TOE, RIGHT, INITIAL ENCOUNTER: ICD-10-CM

## 2022-11-04 PROCEDURE — 11043 DBRDMT MUSC&/FSCA 1ST 20/<: CPT

## 2022-11-04 PROCEDURE — 97605 NEG PRS WND THER DME<=50SQCM: CPT

## 2022-11-04 PROCEDURE — 11043 DBRDMT MUSC&/FSCA 1ST 20/<: CPT | Performed by: STUDENT IN AN ORGANIZED HEALTH CARE EDUCATION/TRAINING PROGRAM

## 2022-11-04 NOTE — PROCEDURES
VAC dressing removed by this RN. Wound bed inspected with flashlight and no residual foam noted. NPWT dressing changed this visit using 2 pieces of black foam to fill wound bed and bridge to right hip. NPWT resumed at 125 mm Hg continuous. No leaks noted.

## 2022-11-04 NOTE — PROGRESS NOTES
Provider Encounter- Pressure Injury        HISTORY OF PRESENT ILLNESS  Wound History:    START OF CARE IN CLINIC: 5/3/2022    REFERRING PROVIDER: Sahara Mendez      WOUNDS- Pressure Injury, Sacrococcygeal, stage IV                                                             Surgical wound dehiscence, left medial lower leg-status post surgical I&D of stage IV pressure injury and           biologic application                    Pressure injury, DTI, left posterior lower leg-first observed in clinic 7/29/2022       Pressure injury stage II L heel - first noted 10/21     Right 2nd toe avulsion - first noted 10/21     Skin tag left posterior ear - first noted 10/21     HISTORY: Patient with history of incomplete quadriplegia referred to [facility] for treatment of a stage IV pressure injury.  He has a history of previous pressure injuries to this area, and underwent muscle flaps in 2019, and then again in 2020.  He was seen in the wound clinic in November 2021 for an ulcer proximal from his current ulcer, and pressure injuries to his left posterior lower leg and left heel.  At that time, it was discovered that the patient had retained VAC foam embedded in the wound bed of the sacral wound.  Attempts were made to get him back to his plastic surgeon, though unsuccessful.  In January he underwent surgical removal of VAC sponge along with excisional debridement of his sacral wound by Dr. Chaves.  After the surgery, his wound went on to heal without incident.   In early April 2022, his home health nurse noted a new sacral ulcer, below the previous ulcer which quickly tripled in size over the following weeks.  The ulcer to his left medial lower leg had also deteriorated, with bone visible at the base..  He was hospitalized from 4/22 until 4/27/2022 and underwent surgery with Dr. Raman on 4/26 for irrigation and debridement of multiple compartments of the left lower extremity, bone excision, and complex closure of chronic wound  using biologic skin substitute.   His sacrococcygeal wound was not surgically addressed during this admission.  He was discharged back to his group home, with home health, and referral to outpatient wound clinic for his sacral wound.  He was instructed to follow-up with his surgeon for his lower leg wound.       Postoperatively, the left medial lower leg incision dehisced.  He was seen by his surgeon at [facility] on 5/11.  The surgeon opted to leave remaining sutures in place, and refer him to the wound clinic for treatment of this wound.   Treatment of this wound was initiated in clinic on 5/12.  During this visit was also noted that his heel DTI had resolved, but that he had a new pressure injury to his left posterior lower extremity.     A new pressure injury was noted to patient's right upper buttock/lower back on 5/20/2022.  Wound was linear in shape, skin discolored but intact.     Abrasion noted to left anterior lower leg.  First observed in clinic on 7/22/2022.  Patient states he bumped his leg into his food tray.     Small DTI noted to patient's left lateral lower leg on 7/29/2022.  Skin intact but discolored.     Large area of deep tissue injury noted to patient's left exterior lower leg.  Patient denied any trauma to this area.  Skin intact.  Wound documented.    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  Never used smokeless tobacco    Patient's problem list, allergies, and current medications reviewed and updated in Epic    Interval History:  Interval History thinned 7/29/2022.  Please see previous notes for complete interval history.     7/29/2022 : Clinic visit with Kelly  ADILSON Sandy, HOLDEN, IMAN, CFCN.  Anjum states he continues to do well.  He was able to go to [employer] yesterday, spent today [place].  His left lower extremity wound has deteriorated, presents today with significant odor and deterioration of tissue.  VAC held from this wound today after debridement.  I also did not apply biologic today.   Sacral wound about the same in area, though some evidence of increasing granulation.  No evidence of infection.  Small abrasion to his left lower leg appears to be improving.  He has a new small DTI to his left lateral lower leg, skin intact but discolored.    8/5/2022 : Clinic visit with ADILSON Arthur, HOLDEN, ALEXN, CFCN.  Anjum continues to feel well.  His left lower wound has improved with VAC hold, will discontinue from this wound altogether.  I did not apply biologic to this wound today given its current improvement.  DTI to posterior left leg is a bit darker today, skin still intact.  Patient states he has been wearing his heel float boot at all times.  Sacral wound with increased granulation, slight decrease in depth, bone still exposed.    8/12/2022 : Clinic visit with ADILSON Arthur, HOLDEN, IMAN, CFTRACI.   Anjum states he is feeling well.  His left lower leg wound continues to improve without the use of VAC or biologic.  The deep tissue injury to his posterior leg is gradually improving, area decreasing.  The abrasion to his left shin is now open, was covered with scab last week.  Sacral wound improving slowly, increase granulation, slight decrease in area.  Home health had messaged that they were concerned for infection due to odor.  I did not appreciate any significant odor today.  We did add Puracyn gel to the wound bed prior to reapplying VAC.  Anjum continues to spend most of his day up in his wheelchair, though tries to reposition frequently, and is wearing heel float boots bilaterally at all times.    8/19/2022 : Clinic visit with ADILSON Arthur,  HOLDEN, IMAN, LILIYA.   Turk states he is in his usual state of health, feeling well overall.  All of his wounds are progressing, though very slowly.  He continues to spend most of the day up in his wheelchair.  He does know to shift his weight frequently, and has an appropriate pressure relief cushion in his chair.    He was seen by [facility] earlier this week, for complaints of leakage around his suprapubic catheter and fever.  He was prescribed Keflex, and his catheter was changed    8/26/2022: Clinic visit with ADILSON Flores.  Patient reports overall he is feeling well denies any fever or chills.  Patient reports that he is continuously wearing Prevalon boots to offload bilateral lower extremities.  The left medial lower extremity wound is quite boggy with a small amount of turbulent fluid which was expressed with minimal palpation to the periwound area.  There is no foul-smelling odor emanating from the wound bed there is no erythema or edema.    9/2/2022 : Clinic visit with ADILSON Arthur, HOLDEN, IMAN, LILIYA.   States he is feeling well overall, denies fevers, chills, nausea, vomiting, cough or shortness of breath.  Unfortunately, his wounds are not progressing significantly.  Leg wound presents with boggy tissue, and probes to bone.  Area and depth of sacral wound have not changed significantly, bone still exposed.  Previously has been seen by several different plastic surgeons, including Dr. Rodriguez, Dr. Nicolas,  Dr. Mott, Dr. Chaves, he was also referred to Dr. Browne at [facility].  None of these surgeons have agreed to see him thus far.    9/9/2022: Clinic visit with Steve Hodges MD. Patient reports doing ok. Denies any changes to health or symptoms of infection. Wounds are not progressing, leg wound can be probed to bone and tissue is boggy. Wound VAC to sacrum with bone still exposed. He reports previously being treated for OM for 6 weeks without resolution. Discussed possibly getting  imaging to eval for OM, however with chronic OM there is limited to no role for prolonged Abx therapy per IDSA guidelines.    9/16/2022: Clinic visit with Steve Hodges MD. Patient reports feeling his normal state of health, denies any fevers or chills. His medial left leg wound is boggy and I was able to express some tan fluid concerning for purulence. Will take wound culture and order abx as appropriate for the results. Will not start empirical Abx as no evidence of cellulitis and his history of multiple rounds of Abx in the past. Will order CT scan of lower extremity and sacrum to further evaluate areas. Sacrum still to bone, had previous history of some kind of retained material in wound bed, will obtain the CT scan to evaluate.    9/23/2022: Clinic visit with Steve Hodges MD. Patient reports doing ok, denies any fevers or chills. Patient is taking Augmentin, reports tolerating well without any side effects. Tissue quality leg wound remains poor, still with some purulence able to be expressed but less than last week. Patient has CT scan planned for 10/3 and had labs today to ensure adequate renal function. Sacrum measuring smaller, with larger undermining.    9/30/2022 : Clinic visit with ADILSON Arthur, HOLDEN, IMAN, LILIYA.   Anjum states that he is feeling well.  He is still taking Augmentin, reports no adverse effects.  Purulent drainage still noted from lower leg wound, with boggy, dusky tissue in the wound bed.  We will extend Augmentin and additional 2 weeks.   Opening of sacral wound has decreased since last assessment, increase granulation within the wound bed noted, however bone still exposed.  His CT is scheduled for Monday 10/3, of both sacrum and his lower leg.    10/7/2022 : Clinic visit with ADILSON Arthur, HOLDEN, IMAN, LILIYA.   Anjum states he is feeling well, offers no complaints.  CT results discussed with him in clinic today, as well as recommendations from interdisciplinary rounds  a few days ago.  He was agreeable to allowing for today I&D of his leg wound today.  Will place referral to Dr. Saini for consideration of plastic intervention.  Patient states that if he were to undergo a flap procedure, he would be willing to be admitted to a skilled nursing facility long enough to allow for healing.   By removed a small chip of bone from leg wound during I&D.  Tissue is dusky and friable.  Will refer patient back to Dr. Raman, orthopedics, for reevaluation.    10/14/2022: Clinic visit with Steve Hodges MD. Patient reports doing well, denies any fevers or chills. Patient reports that his home health nurse feels leg wound improving, appears unchanged and probes to bone. CT scan of leg without definitive evidence of OM, was referred back to orthopedics with Dr. Raman but does not have appointment yet. He was also referred to Dr. Saini, does not have appointment.    10/21/2022: Clinic visit with Steve Hodges MD. Patient reports doing well, denies any symptoms of infection. He presents with left posterior ear skin tag and asked if I could address in clinic as it causes him discomfort when wearing mask. No significant change to left lower extremity wound or sacral wound. He does report having appointment with Dr. Raman 11/14 and with Dr. Saini sometime in November. Patient was found to have near complete avulsion to right 2nd toenail, he does not recall any specific trauma but is insensate. He was also noted to have new stage II pressure injury left heel despite wearing prevalon boots. Counseled to continue offloading heel as much as possible, can place pillow under leg to completely offload heel.    11/4/2022: Clinic visit with Steve Hodges MD. Patient reports doing well, denies any fevers or chills and generally feels well. Patient had episode of Afib and was restarted on Xarelto by cardiology, reports plan for whatchman procedure at some point in future. When assessing left lower  extremity wound, he was noted to have brisk venous bleeding with removal of dressing. Was not debrided and pressure dressing applied with hemostasis achieved. Left heel has increased in depth to stage III pressure injury despite wearing prevalon boots 24 hours a day, his Meliplex was not in place today. We discussed further offloading with pillow under left leg to allow for heel to be frefloated off mattress. Sacrum largely unchanged.   Reports appointment with Dr. Saini next week and appointment with Dr. Raman on 11/14.     REVIEW OF SYSTEMS:   Unchanged from previous wound clinic visit on 10/21/2022, except otherwise noted above.    PHYSICAL EXAMINATION:   /75 (BP Location: Right arm, Patient Position: Sitting)   Pulse 88   Temp 36.4 °C (97.5 °F) (Temporal)   Resp 18   SpO2 93%   Physical Exam  Constitutional:       Appearance: He is obese.   Cardiovascular:      Rate and Rhythm: Bradycardia present.   Pulmonary:      Effort: Pulmonary effort is normal. No respiratory distress.      Breath sounds: No wheezing.   Skin:     Comments: Stage IV coccygeal pressure injury - Largely unchanged with exposed bone  Full-thickness wound to left medial lower leg - Measuring slightly larger, boggy friable tissue with brisk venous bleeding once dressing removed and wound was evaluated.  Stage III pressure injury left posterior heel - measuring larger, worsening from previously noted stage II pressure injury    See flow sheet for details   Neurological:      Mental Status: He is alert and oriented to person, place, and time.       WOUND ASSESSMENT  Wound 04/11/22 Full Thickness Wound Leg Medial Left --Left Medial Lower Leg (Active)   Wound Image   11/04/22 1300   Site Assessment Red;Purple;Boggy;Bleeding;Fragile 11/04/22 1300   Periwound Assessment Edema;Fragile 11/04/22 1300   Margins Attached edges 11/04/22 1300   Drainage Amount Copious 11/04/22 1300   Drainage Description Sanguineous 11/04/22 1300   Treatments  Cleansed;Site care 11/04/22 1300   Wound Cleansing Puracyn Wadsworth 11/04/22 1300   Periwound Protectant Skin Protectant Wipes to Periwound;Barrier Paste 11/04/22 1300   Dressing Cleansing/Solutions Not Applicable 11/04/22 1300   Dressing Options Hydrofiber Silver;Mepilex;Tubigrip 11/04/22 1300   Dressing Changed Changed 11/04/22 1300   Dressing Change/Treatment Frequency Monday, Wednesday, Friday, and As Needed 11/04/22 1300   Non-staged Wound Description Full thickness 11/04/22 1300   Wound Length (cm) 2.5 cm 11/04/22 1300   Wound Width (cm) 2.4 cm 11/04/22 1300   Wound Depth (cm) 1.5 cm 11/04/22 1300   Wound Surface Area (cm^2) 6 cm^2 11/04/22 1300   Wound Volume (cm^3) 9 cm^3 11/04/22 1300   Post-Procedure Length (cm) 2.5 cm 10/21/22 1400   Post-Procedure Width (cm) 2.5 cm 10/21/22 1400   Post-Procedure Depth (cm) 1.2 cm 10/21/22 1400   Post-Procedure Surface Area (cm^2) 6.25 cm^2 10/21/22 1400   Post-Procedure Volume (cm^3) 7.5 cm^3 10/21/22 1400   Wound Healing % -62000 11/04/22 1300   Tunneling (cm) 0 cm 11/04/22 1300   Tunneling Clock Position of Wound 12 07/22/22 1400   Undermining (cm) 0 cm 11/04/22 1300   Wound Odor None 11/04/22 1300   Exposed Structures Bone 11/04/22 1300   Number of days: 207       Wound 04/22/22 Pressure Injury Sacrum POA stage 4 (Active)   Wound Image    11/04/22 1300   Site Assessment Red;Yellow;Other (Comment) 11/04/22 1300   Periwound Assessment Scar tissue;Fragile 11/04/22 1300   Margins Unattached edges 11/04/22 1300   Drainage Amount Moderate 11/04/22 1300   Drainage Description Serosanguineous 11/04/22 1300   Treatments Cleansed;Provider debridement;Site care 11/04/22 1300   Wound Cleansing Puracyn Wadsworth 11/04/22 1300   Periwound Protectant Skin Protectant Wipes to Periwound;Paste Ring;Drape;Benzoin 11/04/22 1300   Dressing Cleansing/Solutions Not Applicable 11/04/22 1300   Dressing Options Wound Vac;Mepilex 11/04/22 1300   Dressing Changed Changed 11/04/22 1300   Dressing  Change/Treatment Frequency Monday, Wednesday, Friday, and As Needed 11/04/22 1300   WOUND NURSE ONLY - Pressure Injury Stage 4 11/04/22 1300   Wound Length (cm) 1.5 cm 11/04/22 1300   Wound Width (cm) 1.7 cm 11/04/22 1300   Wound Depth (cm) 1.7 cm 11/04/22 1300   Wound Surface Area (cm^2) 2.55 cm^2 11/04/22 1300   Wound Volume (cm^3) 4.335 cm^3 11/04/22 1300   Post-Procedure Length (cm) 1.5 cm 11/04/22 1300   Post-Procedure Width (cm) 1.7 cm 11/04/22 1300   Post-Procedure Depth (cm) 1.7 cm 11/04/22 1300   Post-Procedure Surface Area (cm^2) 2.55 cm^2 11/04/22 1300   Post-Procedure Volume (cm^3) 4.335 cm^3 11/04/22 1300   Wound Healing % -81 11/04/22 1300   Tunneling (cm) 2 cm 11/04/22 1300   Tunneling Clock Position of Wound 9 11/04/22 1300   Undermining (cm) 2.3 cm 11/04/22 1300   Undermining of Wound, 1st Location From 6 o'clock;To 2 o'clock 11/04/22 1300   Undermining (cm) - 2nd location 3 cm 07/15/22 1400   Undermining of Wound, 2nd Location From 9 o'clock;To 11 o'clock 07/15/22 1400   Undermining (cm) - 3rd location 3.2 cm 06/24/22 1000   Undermining of Wound, 3rd Location From 7 o'clock;To 11 o'clock 06/24/22 1000   Wound Odor None 11/04/22 1300   Exposed Structures Bone 11/04/22 1300   Number of days: 196       Wound 07/22/22 Traumatic Leg Anterior Left (Active)   Wound Image   11/04/22 1300   Site Assessment Purple;Other (Comment) 11/04/22 1300   Periwound Assessment Dry;Intact 11/04/22 1300   Margins Attached edges 08/19/22 1400   Drainage Amount None 11/04/22 1300   Drainage Description Serosanguineous 08/19/22 1400   Treatments Cleansed;Site care 11/04/22 1300   Wound Cleansing Puracyn McCaskill 11/04/22 1300   Periwound Protectant Skin Protectant Wipes to Periwound 11/04/22 1300   Dressing Cleansing/Solutions Not Applicable 11/04/22 1300   Dressing Options Silicone Adhesive Foam;Tubigrip 11/04/22 1300   Dressing Changed Changed 11/04/22 1300   Dressing Change/Treatment Frequency Monday, Wednesday, Friday, and  As Needed 11/04/22 1300   WOUND NURSE ONLY - Pressure Injury Stage DTPI 11/04/22 1300   Non-staged Wound Description Full thickness 08/12/22 1300   Wound Length (cm) 0.4 cm 08/19/22 1400   Wound Width (cm) 0.4 cm 08/19/22 1400   Wound Depth (cm) 0.1 cm 08/19/22 1400   Wound Surface Area (cm^2) 0.16 cm^2 08/19/22 1400   Wound Volume (cm^3) 0.016 cm^3 08/19/22 1400   Post-Procedure Length (cm) 0.9 cm 08/12/22 1300   Post-Procedure Width (cm) 0.5 cm 08/12/22 1300   Post-Procedure Depth (cm) 0.1 cm 08/12/22 1300   Post-Procedure Surface Area (cm^2) 0.45 cm^2 08/12/22 1300   Post-Procedure Volume (cm^3) 0.045 cm^3 08/12/22 1300   Tunneling (cm) 0 cm 10/07/22 1400   Undermining (cm) 0 cm 10/07/22 1400   Wound Odor None 11/04/22 1300   Exposed Structures None 11/04/22 1300   Number of days: 105       Wound 07/29/22 Pressure Injury Leg Posterior;Lateral Left (Active)   Wound Image   11/04/22 1300   Site Assessment Purple 11/04/22 1300   Periwound Assessment Dry;Intact 11/04/22 1300   Drainage Amount None 11/04/22 1300   Drainage Description Sanguineous 08/12/22 1300   Treatments Cleansed;Site care 11/04/22 1300   Wound Cleansing Puracyn [place] 11/04/22 1300   Periwound Protectant Skin Protectant Wipes to Periwound 11/04/22 1300   Dressing Cleansing/Solutions Not Applicable 11/04/22 1300   Dressing Options Mepilex 11/04/22 1300   Dressing Changed Changed 11/04/22 1300   Dressing Change/Treatment Frequency Monday, Wednesday, Friday, and As Needed 11/04/22 1300   WOUND NURSE ONLY - Pressure Injury Stage DTPI 11/04/22 1300   Wound Length (cm) 0.1 cm 08/12/22 1300   Wound Width (cm) 0.1 cm 08/12/22 1300   Wound Depth (cm) 0.1 cm 08/12/22 1300   Wound Surface Area (cm^2) 0.01 cm^2 08/12/22 1300   Wound Volume (cm^3) 0.001 cm^3 08/12/22 1300   Tunneling (cm) 0 cm 09/30/22 1600   Undermining (cm) 0 cm 09/30/22 1600   Wound Odor None 11/04/22 1300   Exposed Structures None 11/04/22 1300   Number of days: 98       Wound 10/21/22  Pressure Injury Left Posterior Heel stage 3 (Active)   Wound Image    11/04/22 1300   Site Assessment Red;Light Purple;Bleeding 11/04/22 1300   Periwound Assessment Fragile;Scar tissue 11/04/22 1300   Margins Attached edges 11/04/22 1300   Drainage Amount Moderate 11/04/22 1300   Drainage Description Serosanguineous 11/04/22 1300   Treatments Cleansed;Provider debridement;Site care 11/04/22 1300   Wound Cleansing Puracyn Boutte 11/04/22 1300   Periwound Protectant Skin Protectant Wipes to Periwound;Barrier Paste 11/04/22 1300   Dressing Cleansing/Solutions Not Applicable 11/04/22 1300   Dressing Options Hydrofiber Silver;Mepilex Heel;Tubigrip 11/04/22 1300   Dressing Changed New 11/04/22 1300   Dressing Status Clean;Dry;Intact 11/04/22 1300   Dressing Change/Treatment Frequency Monday, Wednesday, Friday, and As Needed 11/04/22 1300   WOUND NURSE ONLY - Pressure Injury Stage 3 11/04/22 1300   Non-staged Wound Description Not applicable 11/04/22 1300   Wound Length (cm) 1.1 cm 11/04/22 1300   Wound Width (cm) 1.5 cm 11/04/22 1300   Wound Depth (cm) 0.5 cm 11/04/22 1300   Wound Surface Area (cm^2) 1.65 cm^2 11/04/22 1300   Wound Volume (cm^3) 0.825 cm^3 11/04/22 1300   Post-Procedure Length (cm) 1 cm 11/04/22 1300   Post-Procedure Width (cm) 1.5 cm 11/04/22 1300   Post-Procedure Depth (cm) 0.5 cm 11/04/22 1300   Post-Procedure Surface Area (cm^2) 1.5 cm^2 11/04/22 1300   Post-Procedure Volume (cm^3) 0.75 cm^3 11/04/22 1300   Wound Healing % -5400 11/04/22 1300   Tunneling (cm) 0 cm 11/04/22 1300   Undermining (cm) 0 cm 11/04/22 1300   Wound Odor None 11/04/22 1300   Exposed Structures None 11/04/22 1300   Number of days: 14     PROCEDURE: Excisional debridement of sacrococcygeal wound and left posterior heel pressure injury.  -Declined lidocaine as he is insensate  -Curette used to debride wound beds.  Excisional debridement was performed to remove devitalized tissue until healthy, bleeding tissue was visualized.   Debridement was carried into the undermined areas of the sacral wound.  Total area debrided approximately 4.05 cm² (including into wound undermining).  Deepest level of debridement was to muscle / fascia.  -Bleeding controlled with manual pressure.    -Wound care completed by wound RN, refer to flowsheet  -Patient tolerated the procedure well, without c/o pain or discomfort.       BIOLOGIC LOG  5/20/2022-first application.  PRODUCT: EpiCord 2 x 3 cm . PRODUCT #[id number]; EXPIRES: 2026-12-01 5/27/2022- second application.  PRODUCT: EpiCordEX 2 x 3 cm . PRODUCT #[id number]; EXPIRES: 2026-09-01    6/3/2022- third application.  PRODUCT: EpiCordEX 2 x 3 cm . PRODUCT #[id number]; EXPIRES: 2026-10-01    6/10/2022- fourth application.  PRODUCT: EpiCordEX 2 x 3 cm . PRODUCT #[id number]; EXPIRES: 2026-09-01 6/17/2022- fifth application.  PRODUCT: EpiCordEX 2 x 3 cm . PRODUCT #[id number]; EXPIRES: 2027-02-01 6/24/2022- sixth application.  PRODUCT: EpiCordEX 2 x 3 cm . PRODUCT #[id number]; EXPIRES: 2027-02-01 07/01/22: Seventh application.  Product: Epicort Dx 2.0 x 3.0 cm reorder number [id number]; product number [id number]; expires 2027-02-01 7/22/2022- eighth application.  PRODUCT: EpiCord 2 x 3 cm, reorder #[id number]. PRODUCT #[id number]; EXPIRES: 2027-02-01      Pertinent Labs and Diagnostics:    Labs:     A1c: No results found for: HBA1C     [facility] results, 7/1/2022 (under media tab)    CRP 13    ESR 31      IMAGING:     10/3/2022-CT of pelvis with contrast  IMPRESSION:     1.  Posterior soft tissue sacral wound and 1.5 x 3.7 cm abscess.   2.  Progressive destruction of the distal sacrum and proximal coccyx. Sclerosis and irregularity of the distal sacrum consistent with osteomyelitis.   3.  Old wound within the soft tissues posterior to the distal left SI joint with possible fistulous communication.    10/3/2022-CT of tib-fib with and without  contrast, with reconstruction  IMPRESSION:     1.  Old fractures of the left tibia and fibula with extensive callus formation and bony bridging as well as extensive dystrophic calcifications. Similar to previous.   2.  Distal medial soft tissue wounds with ill-defined superficial in the soft tissue thickening and fluid collections suggestive of phlegmon. No organized abscess identified.   3.  No definite osteomyelitis.   4.  Arthritic changes.        VASCULAR STUDIES: No results found.    LAST  WOUND CULTURE:   Lab Results   Component Value Date/Time    CULTRSULT No growth at 48 hours. 09/22/2022 01:00 PM          ASSESSMENT AND PLAN:     1. Sacral decubitus ulcer, stage IV ([facility])  Comments: Ulcer first noted in early April 2022 as small open area which quickly enlarged.  This ulcer is present distal from previous sacral ulcer which healed after surgery in January.  Patient has history of flap reconstruction x2 to this area.    11/4/2022: Decrease in exterior wound dimensions, but undermining remains, wound remains stalled.  CT shows progressive destruction of distal sacrum and proximal coccyx.  -Excisional debridement of wound in clinic today, medically necessary to promote wound healing  -Patient is to return to clinic weekly for assessment and debridement   -Home health to continue to change the wound VAC dressing 2 times per week in between clinic visits.  -Referral to Dr. Saini for possible surgical intervention, reports he has appointment next week  -Healing of this wound is further complicated by the patient's multiple comorbidities, exposed bone, and significant pressure from sitting in his wheelchair    Wound care: NPWT at -125 mmHg to accelerate granulation, change 3 times per week.    2. Postoperative wound dehiscence, subsequent encounter  Comments: On 4/26 patient underwent irrigation and debridement of multiple compartments of the left lower extremity states for pressure injury, with bone excision, and  complex closure of chronic wound using biologic skin substitute.  During postop visit in surgeons office on 5/11, surgical site was noted to be dehisced.  Patient was referred to wound clinic for management.    11/4/2022: Wound measuring slightly larger, boggy friable tissue. Brisk venous bleeding when dressing removed and wound was assessed, palpated, and probed.  - Was not debrided due to active bleeding secondary to restarting Xarelto  -Patient to return to clinic weekly for assessment and debridement as needed  -Home health to change dressing 1-2 times per week in between clinic visits   -Due to deterioration of wound, patient was referred to Dr. Raman at [facility] to evaluate for repeat debridement. Reports appointment on 11/14.  -Consider resuming biologic and/or VAC    Wound care: Silver Hydrofiber to manage exudate and bioburden, Mepilex, Tubigrip D to manage edema    3. Deep tissue injury  4. Pressure injury of left leg, unstageable (HCC)  Comments: DTI to posterior left lower leg first observed in clinic 7/29/2022.  Patient does not know how it started, had been wearing heel float boot consistently.    11/4/2022: Remains stable.  Skin still intact  -Continue to monitor for any deterioration in tissue quality  -Patient is currently wearing Prevalon boots to help prevent pressure injuries to bilateral lower extremities    Wound care: Mepilex, Tubigrip D    5. Pressure injury of left heel, stage 3 (HCC)  Comments: First noted in clinic on 10/21/2022, originally noted to be stage II    11/4/2022  - Wound is worsening in size and depth, now noted to be stage III  - He reports that he continues to wear prevalon boots  - Counseled on offloading  - Recommend using pillow under calf to offload heel while in bed as woudn has worsened despite use of prevalon boot  - Excisional debridement was performed in clinic, medically necessary to promote wound healing.     Wound Care: Hydrofiber silver, Mepilex    6. Open wound of  second toe, right, initial encounter  7. Avulsion of toenail, initial encounter  Comments: First noted in clinic on 10/21/2022    11/4/2022  - Resolved    8. Skin tag    11/4/2022  - Skin tag left posterior ear s/p resection with scissors last clinic. Resolved    9. Quadriplegia, C5-C7, complete ([facility])  Comments: Complicating factor.  Impaired mobility and sensation  -Patient is still spending 7-8 hours/day up in his wheelchair, though he does have appropriate offloading cushion, and knows to reposition frequently.  Wears heel float boots bilaterally at all times      Please note that this note may have been created using voice recognition software. I have worked with technical experts from [employer] to optimize the interface.  I have made every reasonable attempt to correct obvious errors, but there may be errors of grammar and possibly content that I did not discover before finalizing the note.

## 2022-11-04 NOTE — PATIENT INSTRUCTIONS
-Keep your wound dressing clean, dry, and intact.    -Wound vac may not have any drainage in tube or cannister & it will still be working.   Change cannister if it does become full by pressing tab on side of machine to remove canister and snap on new one. Full canister can be thrown in the trash. If cannister fills with bright red blood - go to ER. Dressing will be changed every MWF at the wound clinic.  If you are having issues with your wound VAC, please consider patching leaks, changing the canister, or calling [phone] for troubleshooting. If the wound VAC has been off or un-operational for over 2 hours, call wound care center to inform them and remove all dressings including black foam and replace with normal saline damp gauze.     -Should you experience any significant changes in your wound(s), such as infection (redness, swelling, localized heat, increased pain, fever > 101 F, chills) or have any questions regarding your home care instructions, please contact the wound center at [phone]. If after hours, contact your primary care physician or go to the hospital emergency room.

## 2022-11-10 ENCOUNTER — PATIENT MESSAGE (OUTPATIENT)
Dept: HEALTH INFORMATION MANAGEMENT | Facility: OTHER | Age: 72
End: 2022-11-10

## 2022-11-11 ENCOUNTER — OFFICE VISIT (OUTPATIENT)
Dept: WOUND CARE | Facility: MEDICAL CENTER | Age: 72
End: 2022-11-11
Attending: INTERNAL MEDICINE
Payer: MEDICARE

## 2022-11-11 VITALS
RESPIRATION RATE: 18 BRPM | HEART RATE: 55 BPM | DIASTOLIC BLOOD PRESSURE: 88 MMHG | SYSTOLIC BLOOD PRESSURE: 142 MMHG | TEMPERATURE: 97 F | OXYGEN SATURATION: 94 %

## 2022-11-11 DIAGNOSIS — T81.31XD POSTOPERATIVE WOUND DEHISCENCE, SUBSEQUENT ENCOUNTER: ICD-10-CM

## 2022-11-11 DIAGNOSIS — L89.154 SACRAL DECUBITUS ULCER, STAGE IV (HCC): ICD-10-CM

## 2022-11-11 DIAGNOSIS — L89.623 PRESSURE INJURY OF LEFT HEEL, STAGE 3 (HCC): ICD-10-CM

## 2022-11-11 DIAGNOSIS — T14.8XXA DEEP TISSUE INJURY: ICD-10-CM

## 2022-11-11 PROCEDURE — 11043 DBRDMT MUSC&/FSCA 1ST 20/<: CPT | Performed by: STUDENT IN AN ORGANIZED HEALTH CARE EDUCATION/TRAINING PROGRAM

## 2022-11-11 PROCEDURE — 11043 DBRDMT MUSC&/FSCA 1ST 20/<: CPT

## 2022-11-11 PROCEDURE — 97605 NEG PRS WND THER DME<=50SQCM: CPT

## 2022-11-11 NOTE — PATIENT INSTRUCTIONS
Should you experience any significant changes in your wound(s) such as infection (redness, swelling, localized heat, increased pain, fever >101 F, chills) or have any questions regarding your home care instructions, please contact the wound center [phone]. If after hours, contact your primary care physician or go the hospital emergency room.  Keep dressing clean and dry and cover while bathing. Only change dressing if over saturated, soiled or its falling off.

## 2022-11-11 NOTE — PROGRESS NOTES
Provider Encounter- Pressure Injury        HISTORY OF PRESENT ILLNESS  Wound History:    START OF CARE IN CLINIC: 5/3/2022    REFERRING PROVIDER: Sahara Mendez      WOUNDS- Pressure Injury, Sacrococcygeal, stage IV                                                             Surgical wound dehiscence, left medial lower leg-status post surgical I&D of stage IV pressure injury and           biologic application                    Pressure injury, DTI, left posterior lower leg-first observed in clinic 7/29/2022       Pressure injury stage II L heel - first noted 10/21     Right 2nd toe avulsion - first noted 10/21     Skin tag left posterior ear - first noted 10/21     HISTORY: Patient with history of incomplete quadriplegia referred to [facility] for treatment of a stage IV pressure injury.  He has a history of previous pressure injuries to this area, and underwent muscle flaps in 2019, and then again in 2020.  He was seen in the wound clinic in November 2021 for an ulcer proximal from his current ulcer, and pressure injuries to his left posterior lower leg and left heel.  At that time, it was discovered that the patient had retained VAC foam embedded in the wound bed of the sacral wound.  Attempts were made to get him back to his plastic surgeon, though unsuccessful.  In January he underwent surgical removal of VAC sponge along with excisional debridement of his sacral wound by Dr. Chaves.  After the surgery, his wound went on to heal without incident.   In early April 2022, his home health nurse noted a new sacral ulcer, below the previous ulcer which quickly tripled in size over the following weeks.  The ulcer to his left medial lower leg had also deteriorated, with bone visible at the base..  He was hospitalized from 4/22 until 4/27/2022 and underwent surgery with Dr. Raman on 4/26 for irrigation and debridement of multiple compartments of the left lower extremity, bone excision, and complex closure of chronic wound  using biologic skin substitute.   His sacrococcygeal wound was not surgically addressed during this admission.  He was discharged back to his group home, with home health, and referral to outpatient wound clinic for his sacral wound.  He was instructed to follow-up with his surgeon for his lower leg wound.       Postoperatively, the left medial lower leg incision dehisced.  He was seen by his surgeon at [facility] on 5/11.  The surgeon opted to leave remaining sutures in place, and refer him to the wound clinic for treatment of this wound.   Treatment of this wound was initiated in clinic on 5/12.  During this visit was also noted that his heel DTI had resolved, but that he had a new pressure injury to his left posterior lower extremity.     A new pressure injury was noted to patient's right upper buttock/lower back on 5/20/2022.  Wound was linear in shape, skin discolored but intact.     Abrasion noted to left anterior lower leg.  First observed in clinic on 7/22/2022.  Patient states he bumped his leg into his food tray.     Small DTI noted to patient's left lateral lower leg on 7/29/2022.  Skin intact but discolored.     Large area of deep tissue injury noted to patient's left exterior lower leg.  Patient denied any trauma to this area.  Skin intact.  Wound documented.    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  Never used smokeless tobacco    Patient's problem list, allergies, and current medications reviewed and updated in Epic    Interval History:  Interval History thinned 7/29/2022.  Please see previous notes for complete interval history.     7/29/2022 : Clinic visit with Kelly  ADILSON Sandy, HOLDEN, IMAN, CFCN.  Anjum states he continues to do well.  He was able to go to [place] yesterday, spent today [place].  His left lower extremity wound has deteriorated, presents today with significant odor and deterioration of tissue.  VAC held from this wound today after debridement.  I also did not apply biologic today.   Sacral wound about the same in area, though some evidence of increasing granulation.  No evidence of infection.  Small abrasion to his left lower leg appears to be improving.  He has a new small DTI to his left lateral lower leg, skin intact but discolored.    8/5/2022 : Clinic visit with ADILSON Arthur, HOLDEN, ALEXN, CFCN.  Anjum continues to feel well.  His left lower wound has improved with VAC hold, will discontinue from this wound altogether.  I did not apply biologic to this wound today given its current improvement.  DTI to posterior left leg is a bit darker today, skin still intact.  Patient states he has been wearing his heel float boot at all times.  Sacral wound with increased granulation, slight decrease in depth, bone still exposed.    8/12/2022 : Clinic visit with ADILSON Arthur, HOLDEN, IMAN, CFTRACI.   Anjum states he is feeling well.  His left lower leg wound continues to improve without the use of VAC or biologic.  The deep tissue injury to his posterior leg is gradually improving, area decreasing.  The abrasion to his left shin is now open, was covered with scab last week.  Sacral wound improving slowly, increase granulation, slight decrease in area.  Home health had messaged that they were concerned for infection due to odor.  I did not appreciate any significant odor today.  We did add Puracyn gel to the wound bed prior to reapplying VAC.  Anjum continues to spend most of his day up in his wheelchair, though tries to reposition frequently, and is wearing heel float boots bilaterally at all times.    8/19/2022 : Clinic visit with ADILSON Arthur,  HOLDEN, IMAN, LILIYA.   Turk states he is in his usual state of health, feeling well overall.  All of his wounds are progressing, though very slowly.  He continues to spend most of the day up in his wheelchair.  He does know to shift his weight frequently, and has an appropriate pressure relief cushion in his chair.    He was seen by [facility] earlier this week, for complaints of leakage around his suprapubic catheter and fever.  He was prescribed Keflex, and his catheter was changed    8/26/2022: Clinic visit with ADILSON Flores.  Patient reports overall he is feeling well denies any fever or chills.  Patient reports that he is continuously wearing Prevalon boots to offload bilateral lower extremities.  The left medial lower extremity wound is quite boggy with a small amount of turbulent fluid which was expressed with minimal palpation to the periwound area.  There is no foul-smelling odor emanating from the wound bed there is no erythema or edema.    9/2/2022 : Clinic visit with ADILSON Arthur, HOLDEN, IMAN, LILIYA.   States he is feeling well overall, denies fevers, chills, nausea, vomiting, cough or shortness of breath.  Unfortunately, his wounds are not progressing significantly.  Leg wound presents with boggy tissue, and probes to bone.  Area and depth of sacral wound have not changed significantly, bone still exposed.  Previously has been seen by several different plastic surgeons, including Dr. Rodriguez, Dr. Nicolas,  Dr. Mott, Dr. Chaves, he was also referred to Dr. Browne at [facility].  None of these surgeons have agreed to see him thus far.    9/9/2022: Clinic visit with Steve Hodges MD. Patient reports doing ok. Denies any changes to health or symptoms of infection. Wounds are not progressing, leg wound can be probed to bone and tissue is boggy. Wound VAC to sacrum with bone still exposed. He reports previously being treated for OM for 6 weeks without resolution. Discussed possibly getting  imaging to eval for OM, however with chronic OM there is limited to no role for prolonged Abx therapy per IDSA guidelines.    9/16/2022: Clinic visit with Steve Hodges MD. Patient reports feeling his normal state of health, denies any fevers or chills. His medial left leg wound is boggy and I was able to express some tan fluid concerning for purulence. Will take wound culture and order abx as appropriate for the results. Will not start empirical Abx as no evidence of cellulitis and his history of multiple rounds of Abx in the past. Will order CT scan of lower extremity and sacrum to further evaluate areas. Sacrum still to bone, had previous history of some kind of retained material in wound bed, will obtain the CT scan to evaluate.    9/23/2022: Clinic visit with Steve Hodges MD. Patient reports doing ok, denies any fevers or chills. Patient is taking Augmentin, reports tolerating well without any side effects. Tissue quality leg wound remains poor, still with some purulence able to be expressed but less than last week. Patient has CT scan planned for 10/3 and had labs today to ensure adequate renal function. Sacrum measuring smaller, with larger undermining.    9/30/2022 : Clinic visit with ADILSON Arthur, HOLDEN, IMAN, LILIYA.   Anjum states that he is feeling well.  He is still taking Augmentin, reports no adverse effects.  Purulent drainage still noted from lower leg wound, with boggy, dusky tissue in the wound bed.  We will extend Augmentin and additional 2 weeks.   Opening of sacral wound has decreased since last assessment, increase granulation within the wound bed noted, however bone still exposed.  His CT is scheduled for Monday 10/3, of both sacrum and his lower leg.    10/7/2022 : Clinic visit with ADILSON Arthur, HOLDEN, IMAN, LILIYA.   Anjum states he is feeling well, offers no complaints.  CT results discussed with him in clinic today, as well as recommendations from interdisciplinary rounds  a few days ago.  He was agreeable to allowing for today I&D of his leg wound today.  Will place referral to Dr. Saini for consideration of plastic intervention.  Patient states that if he were to undergo a flap procedure, he would be willing to be admitted to a skilled nursing facility long enough to allow for healing.   By removed a small chip of bone from leg wound during I&D.  Tissue is dusky and friable.  Will refer patient back to Dr. Raman, orthopedics, for reevaluation.    10/14/2022: Clinic visit with Steve Hodges MD. Patient reports doing well, denies any fevers or chills. Patient reports that his home health nurse feels leg wound improving, appears unchanged and probes to bone. CT scan of leg without definitive evidence of OM, was referred back to orthopedics with Dr. Raman but does not have appointment yet. He was also referred to Dr. Saini, does not have appointment.    10/21/2022: Clinic visit with Steve Hodges MD. Patient reports doing well, denies any symptoms of infection. He presents with left posterior ear skin tag and asked if I could address in clinic as it causes him discomfort when wearing mask. No significant change to left lower extremity wound or sacral wound. He does report having appointment with Dr. Raman 11/14 and with Dr. Saini sometime in November. Patient was found to have near complete avulsion to right 2nd toenail, he does not recall any specific trauma but is insensate. He was also noted to have new stage II pressure injury left heel despite wearing prevalon boots. Counseled to continue offloading heel as much as possible, can place pillow under leg to completely offload heel.    11/4/2022: Clinic visit with Steve Hodges MD. Patient reports doing well, denies any fevers or chills and generally feels well. Patient had episode of Afib and was restarted on Xarelto by cardiology, reports plan for whatchman procedure at some point in future. When assessing left lower  extremity wound, he was noted to have brisk venous bleeding with removal of dressing. Was not debrided and pressure dressing applied with hemostasis achieved. Left heel has increased in depth to stage III pressure injury despite wearing prevalon boots 24 hours a day, his Meliplex was not in place today. We discussed further offloading with pillow under left leg to allow for heel to be frefloated off mattress. Sacrum largely unchanged.   Reports appointment with Dr. Saini next week and appointment with Dr. Raman on 11/14.    11/11/2022: Clinic visit with Steve Hodges MD. Patient reports feeling well, denies any fevers or chills. Wound to his left leg appear worsening, now two distinct wounds that probe to bone and communicate. He has appointment with Dr. Raman next week. Counseled him to possibly consider amputation, reports that he has been discussing with Dr. Raman.   Sacrum remains largely unchanged. He is being evaluated for possible closure by Dr. Saini. I discussed case with Dr. Saini and he will evaluate wound care pictures to determine if he is candidate for surgical intervention.     REVIEW OF SYSTEMS:   Unchanged from previous wound clinic visit on 11/4/2022, except otherwise noted above.    PHYSICAL EXAMINATION:   BP (!) 142/88 (BP Location: Right arm, Patient Position: Sitting) Comment: RN notified  Pulse (!) 55   Temp 36.1 °C (97 °F) (Temporal)   Resp 18   SpO2 94%   Physical Exam  Constitutional:       Appearance: He is obese.   Cardiovascular:      Rate and Rhythm: Bradycardia present.   Pulmonary:      Effort: Pulmonary effort is normal. No respiratory distress.      Breath sounds: No wheezing.   Skin:     Comments: Stage IV coccygeal pressure injury - Unchanged, large undermining with exposed bone. Minimal granulation tissue.    Full-thickness wound to left medial lower leg - Measuring slightly larger, now two distinct wounds that probe to bone and wounds connect. Continues to have  boggy friable tissue    Stage III pressure injury left posterior heel - appears to be constellation of 3 wounds, worsening from previously noted stage II pressure injury    See flow sheet for details   Neurological:      Mental Status: He is alert and oriented to person, place, and time.       WOUND ASSESSMENT  Wound 04/11/22 Full Thickness Wound Leg Medial Left --Left Medial Lower Leg Posterior (Active)   Wound Image   11/11/22 1300   Site Assessment Red;Purple;Boggy;Fragile 11/11/22 1300   Periwound Assessment Edema;Fragile 11/11/22 1300   Margins Unattached edges 11/11/22 1300   Drainage Amount Large 11/11/22 1300   Drainage Description Serosanguineous 11/11/22 1300   Treatments Cleansed;Site care 11/11/22 1300   Wound Cleansing Puracyn [place] 11/11/22 1300   Periwound Protectant Skin Protectant Wipes to Periwound;Barrier Paste 11/11/22 1300   Dressing Cleansing/Solutions Not Applicable 11/11/22 1300   Dressing Options Hydrofiber Silver;Mepilex;Tubigrip 11/11/22 1300   Dressing Changed Changed 11/11/22 1300   Dressing Change/Treatment Frequency Monday, Wednesday, Friday, and As Needed 11/11/22 1300   Non-staged Wound Description Full thickness 11/11/22 1300   Wound Length (cm) 2.5 cm 11/11/22 1300   Wound Width (cm) 1.9 cm 11/11/22 1300   Wound Depth (cm) 1.5 cm 11/11/22 1300   Wound Surface Area (cm^2) 4.75 cm^2 11/11/22 1300   Wound Volume (cm^3) 7.125 cm^3 11/11/22 1300   Post-Procedure Length (cm) 2.5 cm 10/21/22 1400   Post-Procedure Width (cm) 2.5 cm 10/21/22 1400   Post-Procedure Depth (cm) 1.2 cm 10/21/22 1400   Post-Procedure Surface Area (cm^2) 6.25 cm^2 10/21/22 1400   Post-Procedure Volume (cm^3) 7.5 cm^3 10/21/22 1400   Wound Healing % -[id number] 11/11/22 1300   Tunneling (cm) 0 cm 11/11/22 1300   Tunneling Clock Position of Wound 12 07/22/22 1400   Undermining (cm) 0 cm 11/11/22 1300   Wound Odor None 11/11/22 1300   Exposed Structures Bone 11/11/22 1300   Number of days: 215       Wound 04/22/22  Pressure Injury Sacrum POA stage 4 (Active)   Wound Image     11/11/22 1300   Site Assessment Red;Yellow;Other (Comment) 11/11/22 1300   Periwound Assessment Scar tissue;Fragile 11/11/22 1300   Margins Unattached edges 11/11/22 1300   Drainage Amount Small 11/11/22 1300   Drainage Description Serosanguineous 11/11/22 1300   Treatments Cleansed;Provider debridement;Site care 11/11/22 1300   Wound Cleansing Puracyn [place] 11/11/22 1300   Periwound Protectant Skin Protectant Wipes to Periwound;Paste Ring;Drape 11/11/22 1300   Dressing Cleansing/Solutions Not Applicable 11/11/22 1300   Dressing Options Wound Vac;Mepilex 11/11/22 1300   Dressing Changed Changed 11/11/22 1300   Dressing Change/Treatment Frequency Monday, Wednesday, Friday, and As Needed 11/11/22 1300   WOUND NURSE ONLY - Pressure Injury Stage 4 11/11/22 1300   Wound Length (cm) 1.5 cm 11/11/22 1300   Wound Width (cm) 1.7 cm 11/11/22 1300   Wound Depth (cm) 1.7 cm 11/11/22 1300   Wound Surface Area (cm^2) 2.55 cm^2 11/11/22 1300   Wound Volume (cm^3) 4.335 cm^3 11/11/22 1300   Post-Procedure Length (cm) 1.5 cm 11/11/22 1300   Post-Procedure Width (cm) 1.8 cm 11/11/22 1300   Post-Procedure Depth (cm) 1.7 cm 11/11/22 1300   Post-Procedure Surface Area (cm^2) 2.7 cm^2 11/11/22 1300   Post-Procedure Volume (cm^3) 4.59 cm^3 11/11/22 1300   Wound Healing % -81 11/11/22 1300   Tunneling (cm) 0 cm 11/11/22 1300   Tunneling Clock Position of Wound 9 11/04/22 1300   Undermining (cm) 1.9 cm 11/11/22 1300   Undermining of Wound, 1st Location From 9 o'clock;To 3 o'clock 11/11/22 1300   Undermining (cm) - 2nd location 3 cm 07/15/22 1400   Undermining of Wound, 2nd Location From 9 o'clock;To 11 o'clock 07/15/22 1400   Undermining (cm) - 3rd location 3.2 cm 06/24/22 1000   Undermining of Wound, 3rd Location From 7 o'clock;To 11 o'clock 06/24/22 1000   Wound Odor None 11/11/22 1300   Exposed Structures Bone 11/11/22 1300   Number of days: 204       Wound 07/22/22 Traumatic  Leg Anterior Left (Active)   Wound Image   11/11/22 1300   Site Assessment Purple;Other (Comment) 11/11/22 1300   Periwound Assessment Dry;Intact 11/11/22 1300   Margins Attached edges 08/19/22 1400   Drainage Amount None 11/11/22 1300   Drainage Description Serosanguineous 08/19/22 1400   Treatments Cleansed;Site care 11/11/22 1300   Wound Cleansing Normal Saline Irrigation 11/11/22 1300   Periwound Protectant Skin Protectant Wipes to Periwound 11/11/22 1300   Dressing Cleansing/Solutions Not Applicable 11/11/22 1300   Dressing Options Silicone Adhesive Foam;Tubigrip 11/11/22 1300   Dressing Changed Changed 11/11/22 1300   Dressing Change/Treatment Frequency Monday, Wednesday, Friday, and As Needed 11/11/22 1300   WOUND NURSE ONLY - Pressure Injury Stage DTPI 11/11/22 1300   Non-staged Wound Description Full thickness 08/12/22 1300   Wound Length (cm) 0.4 cm 08/19/22 1400   Wound Width (cm) 0.4 cm 08/19/22 1400   Wound Depth (cm) 0.1 cm 08/19/22 1400   Wound Surface Area (cm^2) 0.16 cm^2 08/19/22 1400   Wound Volume (cm^3) 0.016 cm^3 08/19/22 1400   Post-Procedure Length (cm) 0.9 cm 08/12/22 1300   Post-Procedure Width (cm) 0.5 cm 08/12/22 1300   Post-Procedure Depth (cm) 0.1 cm 08/12/22 1300   Post-Procedure Surface Area (cm^2) 0.45 cm^2 08/12/22 1300   Post-Procedure Volume (cm^3) 0.045 cm^3 08/12/22 1300   Tunneling (cm) 0 cm 10/07/22 1400   Undermining (cm) 0 cm 10/07/22 1400   Wound Odor None 11/11/22 1300   Exposed Structures None 11/11/22 1300   Number of days: 113       Wound 07/29/22 Pressure Injury Leg Posterior;Lateral Left (Active)   Wound Image   11/11/22 1300   Site Assessment Purple 11/11/22 1300   Periwound Assessment Dry;Intact 11/11/22 1300   Drainage Amount None 11/11/22 1300   Drainage Description Sanguineous 08/12/22 1300   Treatments Cleansed;Site care 11/11/22 1300   Wound Cleansing Puracyn [place] 11/11/22 1300   Periwound Protectant Skin Protectant Wipes to Periwound 11/11/22 1300   Dressing  Cleansing/Solutions Not Applicable 11/11/22 1300   Dressing Options Mepilex;Tubigrip 11/11/22 1300   Dressing Changed Changed 11/11/22 1300   Dressing Change/Treatment Frequency Monday, Wednesday, Friday, and As Needed 11/11/22 1300   WOUND NURSE ONLY - Pressure Injury Stage DTPI 11/11/22 1300   Wound Length (cm) 0.1 cm 08/12/22 1300   Wound Width (cm) 0.1 cm 08/12/22 1300   Wound Depth (cm) 0.1 cm 08/12/22 1300   Wound Surface Area (cm^2) 0.01 cm^2 08/12/22 1300   Wound Volume (cm^3) 0.001 cm^3 08/12/22 1300   Tunneling (cm) 0 cm 09/30/22 1600   Undermining (cm) 0 cm 09/30/22 1600   Wound Odor None 11/11/22 1300   Exposed Structures None 11/11/22 1300   Number of days: 106       Wound 10/21/22 Pressure Injury Left Posterior Heel stage 3 (Active)   Wound Image    11/11/22 1300   Site Assessment Red;White;Yellow 11/11/22 1300   Periwound Assessment Fragile;Scar tissue 11/11/22 1300   Margins Attached edges 11/11/22 1300   Drainage Amount Moderate 11/11/22 1300   Drainage Description Serosanguineous 11/11/22 1300   Treatments Cleansed;Provider debridement;Site care 11/11/22 1300   Wound Cleansing Puracyn [place] 11/11/22 1300   Periwound Protectant Skin Protectant Wipes to Periwound;Barrier Paste 11/11/22 1300   Dressing Cleansing/Solutions Not Applicable 11/11/22 1300   Dressing Options Hydrofiber Silver;Mepilex Heel;Tubigrip;Other (Comments) 11/11/22 1300   Dressing Changed Changed 11/11/22 1300   Dressing Status Clean;Dry;Intact 11/04/22 1300   Dressing Change/Treatment Frequency Monday, Wednesday, Friday, and As Needed 11/11/22 1300   WOUND NURSE ONLY - Pressure Injury Stage 3 11/11/22 1300   Non-staged Wound Description Not applicable 11/04/22 1300   Wound Length (cm) 2 cm 11/11/22 1300   Wound Width (cm) 2.2 cm 11/11/22 1300   Wound Depth (cm) 0.2 cm 11/11/22 1300   Wound Surface Area (cm^2) 4.4 cm^2 11/11/22 1300   Wound Volume (cm^3) 0.88 cm^3 11/11/22 1300   Post-Procedure Length (cm) 2 cm 11/11/22 1300    Post-Procedure Width (cm) 2.3 cm 11/11/22 1300   Post-Procedure Depth (cm) 0.2 cm 11/11/22 1300   Post-Procedure Surface Area (cm^2) 4.6 cm^2 11/11/22 1300   Post-Procedure Volume (cm^3) 0.92 cm^3 11/11/22 1300   Wound Healing % -5767 11/11/22 1300   Tunneling (cm) 0 cm 11/11/22 1300   Undermining (cm) 0 cm 11/11/22 1300   Wound Odor None 11/11/22 1300   Exposed Structures None 11/11/22 1300   Number of days: 22       Wound 11/11/22 LLE Medial Anterior (Active)   Wound Image   11/11/22 1300   Site Assessment Red;Boggy;Fragile 11/11/22 1300   Periwound Assessment Fragile 11/11/22 1300   Margins Unattached edges 11/11/22 1300   Drainage Amount Moderate 11/11/22 1300   Drainage Description Serosanguineous 11/11/22 1300   Treatments Cleansed;Site care 11/11/22 1300   Wound Cleansing Puracyn [place] 11/11/22 1300   Periwound Protectant Skin Protectant Wipes to Periwound;Barrier Paste 11/11/22 1300   Dressing Cleansing/Solutions Not Applicable 11/11/22 1300   Dressing Options Hydrofiber Silver;Mepilex;Tubigrip 11/11/22 1300   Dressing Changed New 11/11/22 1300   Dressing Change/Treatment Frequency Monday, Wednesday, Friday, and As Needed 11/11/22 1300   Non-staged Wound Description Full thickness 11/11/22 1300   Wound Length (cm) 1.2 cm 11/11/22 1300   Wound Width (cm) 0.8 cm 11/11/22 1300   Wound Depth (cm) 0.7 cm 11/11/22 1300   Wound Surface Area (cm^2) 0.96 cm^2 11/11/22 1300   Wound Volume (cm^3) 0.672 cm^3 11/11/22 1300   Tunneling (cm) 1.2 cm 11/11/22 1300   Tunneling Clock Position of Wound 9 11/11/22 1300   Undermining (cm) 0 cm 11/11/22 1300   Wound Odor None 11/11/22 1300   Exposed Structures Bone 11/11/22 1300   Number of days: 1     PROCEDURE: Excisional debridement of sacrococcygeal wound and left posterior heel pressure injury.  -Declined lidocaine as he is insensate  -Curette used to debride wound beds.  Excisional debridement was performed to remove devitalized tissue until healthy, bleeding tissue was  visualized.  Debridement was carried into the undermined areas of the sacral wound.  Total area debrided approximately 7.3 cm² (including into wound undermining).  Deepest level of debridement was to muscle / fascia.  -Bleeding controlled with manual pressure.    -Wound care completed by wound RN, refer to flowsheet  -Patient tolerated the procedure well, without c/o pain or discomfort.       BIOLOGIC LOG  5/20/2022-first application.  PRODUCT: EpiCord 2 x 3 cm . PRODUCT #[id number]; EXPIRES: 2026-12-01 5/27/2022- second application.  PRODUCT: EpiCordEX 2 x 3 cm . PRODUCT #[id number]; EXPIRES: 2026-09-01    6/3/2022- third application.  PRODUCT: EpiCordEX 2 x 3 cm . PRODUCT #EX [id number]; EXPIRES: 2026-10-01    6/10/2022- fourth application.  PRODUCT: EpiCordEX 2 x 3 cm . PRODUCT #EX [id number]; EXPIRES: 2026-09-01 6/17/2022- fifth application.  PRODUCT: EpiCordEX 2 x 3 cm . PRODUCT #EX [id number]; EXPIRES: 2027-02-01 6/24/2022- sixth application.  PRODUCT: EpiCordEX 2 x 3 cm . PRODUCT #EX [id number]; EXPIRES: 2027-02-01 07/01/22: Seventh application.  Product: Epicort Dx 2.0 x 3.0 cm reorder number [id number]; product number [id number]; expires 2027-02-01 7/22/2022- eighth application.  PRODUCT: EpiCord 2 x 3 cm, reorder #[id number]. PRODUCT #[id number]; EXPIRES: 2027-02-01      Pertinent Labs and Diagnostics:    Labs:     A1c: No results found for: HBA1C     [facility] results, 7/1/2022 (under media tab)    CRP 13    ESR 31      IMAGING:     10/3/2022-CT of pelvis with contrast  IMPRESSION:     1.  Posterior soft tissue sacral wound and 1.5 x 3.7 cm abscess.   2.  Progressive destruction of the distal sacrum and proximal coccyx. Sclerosis and irregularity of the distal sacrum consistent with osteomyelitis.   3.  Old wound within the soft tissues posterior to the distal left SI joint with possible fistulous communication.    10/3/2022-CT of tib-fib with and  without contrast, with reconstruction  IMPRESSION:     1.  Old fractures of the left tibia and fibula with extensive callus formation and bony bridging as well as extensive dystrophic calcifications. Similar to previous.   2.  Distal medial soft tissue wounds with ill-defined superficial in the soft tissue thickening and fluid collections suggestive of phlegmon. No organized abscess identified.   3.  No definite osteomyelitis.   4.  Arthritic changes.        VASCULAR STUDIES: No results found.    LAST  WOUND CULTURE:   Lab Results   Component Value Date/Time    CULTRSULT No growth at 48 hours. 09/22/2022 01:00 PM          ASSESSMENT AND PLAN:     1. Sacral decubitus ulcer, stage IV ([facility])  Comments: Ulcer first noted in early April 2022 as small open area which quickly enlarged.  This ulcer is present distal from previous sacral ulcer which healed after surgery in January.  Patient has history of flap reconstruction x2 to this area.    11/11/2022: Wound unchanged. Undermining remains, wound remains stalled.  CT shows progressive destruction of distal sacrum and proximal coccyx.  -Excisional debridement of wound in clinic today, medically necessary to promote wound healing  -Patient is to return to clinic weekly for assessment and debridement   -Home health to continue to change the wound VAC dressing 2 times per week in between clinic visits.  -Dr. Saini following case and to evaluate for possible surgical intervention  -Healing of this wound is further complicated by the patient's multiple comorbidities, exposed bone, and significant pressure from sitting in his wheelchair    Wound care: NPWT at -125 mmHg to accelerate granulation, change 3 times per week.    2. Postoperative wound dehiscence, subsequent encounter  Comments: On 4/26 patient underwent irrigation and debridement of multiple compartments of the left lower extremity states for pressure injury, with bone excision, and complex closure of chronic wound  using biologic skin substitute.  During postop visit in surgeons office on 5/11, surgical site was noted to be dehisced.  Patient was referred to wound clinic for management.    11/11/2022: Wound worsening, now two wounds, boggy friable tissue.  - Was not debrided  -Patient to return to clinic weekly for assessment and debridement as needed  -Home health to change dressing 1-2 times per week in between clinic visits   -Due to deterioration of wound, patient was referred to Dr. Raman at [facility] to evaluate for repeat debridement. Reports appointment on 11/14.  -Consider resuming biologic and/or VAC    Wound care: Silver Hydrofiber to manage exudate and bioburden, Mepilex, Tubigrip D to manage edema    3. Deep tissue injury  4. Pressure injury of left leg, unstageable ([facility])  Comments: DTI to posterior left lower leg first observed in clinic 7/29/2022.  Patient does not know how it started, had been wearing heel float boot consistently.    11/11/2022: Remains stable.  Skin still intact  -Continue to monitor for any deterioration in tissue quality  -Patient is currently wearing Prevalon boots to help prevent pressure injuries to bilateral lower extremities    Wound care: Mepilex, Tubigrip D    5. Pressure injury of left heel, stage 3 ([facility])  Comments: First noted in clinic on 10/21/2022, originally noted to be stage II    11/11/2022  - Wound is worsening in size and depth, now noted to be stage III  - He reports that he continues to wear prevalon boots  - Counseled on offloading  - Recommend using pillow under calf to offload heel while in bed as woudn has worsened despite use of prevalon boot  - Excisional debridement was performed in clinic, medically necessary to promote wound healing.     Wound Care: Hydrofiber silver, Mepilex    6. Open wound of second toe, right, initial encounter  7. Avulsion of toenail, initial encounter  Comments: First noted in clinic on 10/21/2022  - Resolved    8. Quadriplegia, C5-C7, complete  ([facility])  Comments: Complicating factor.  Impaired mobility and sensation  -Patient is still spending 7-8 hours/day up in his wheelchair, though he does have appropriate offloading cushion, and knows to reposition frequently.  Wears heel float boots bilaterally at all times      Please note that this note may have been created using voice recognition software. I have worked with technical experts from [facility] [employer] to optimize the interface.  I have made every reasonable attempt to correct obvious errors, but there may be errors of grammar and possibly content that I did not discover before finalizing the note.

## 2022-11-12 LAB — EKG IMPRESSION: NORMAL

## 2022-11-13 ENCOUNTER — NON-PROVIDER VISIT (OUTPATIENT)
Dept: CARDIOLOGY | Facility: MEDICAL CENTER | Age: 72
End: 2022-11-13
Payer: MEDICARE

## 2022-11-13 PROCEDURE — 93298 REM INTERROG DEV EVAL SCRMS: CPT | Performed by: INTERNAL MEDICINE

## 2022-11-14 NOTE — CARDIAC REMOTE MONITOR - SCAN
Device transmission reviewed. Device demonstrated appropriate function. One PSVT cannot r/o atrial flutter      Electronically Signed by: Lex Skinner M.D.    11/15/2022  11:00 AM

## 2022-11-17 ENCOUNTER — HOSPITAL ENCOUNTER (OUTPATIENT)
Facility: MEDICAL CENTER | Age: 72
End: 2022-11-17
Attending: PHYSICIAN ASSISTANT
Payer: MEDICARE

## 2022-11-17 PROCEDURE — 87086 URINE CULTURE/COLONY COUNT: CPT

## 2022-11-17 PROCEDURE — 87077 CULTURE AEROBIC IDENTIFY: CPT

## 2022-11-18 ENCOUNTER — OFFICE VISIT (OUTPATIENT)
Dept: WOUND CARE | Facility: MEDICAL CENTER | Age: 72
End: 2022-11-18
Attending: INTERNAL MEDICINE
Payer: MEDICARE

## 2022-11-18 ENCOUNTER — PRE-ADMISSION TESTING (OUTPATIENT)
Dept: ADMISSIONS | Facility: MEDICAL CENTER | Age: 72
DRG: 273 | End: 2022-11-18
Attending: INTERNAL MEDICINE
Payer: MEDICARE

## 2022-11-18 ENCOUNTER — TELEPHONE (OUTPATIENT)
Dept: CARDIOLOGY | Facility: MEDICAL CENTER | Age: 72
End: 2022-11-18

## 2022-11-18 VITALS
SYSTOLIC BLOOD PRESSURE: 163 MMHG | RESPIRATION RATE: 18 BRPM | TEMPERATURE: 97.9 F | DIASTOLIC BLOOD PRESSURE: 85 MMHG | HEART RATE: 48 BPM | OXYGEN SATURATION: 96 %

## 2022-11-18 DIAGNOSIS — G82.53 QUADRIPLEGIA, C5-C7, COMPLETE (HCC): ICD-10-CM

## 2022-11-18 DIAGNOSIS — T81.31XD POSTOPERATIVE WOUND DEHISCENCE, SUBSEQUENT ENCOUNTER: ICD-10-CM

## 2022-11-18 DIAGNOSIS — Z01.812 PRE-OPERATIVE LABORATORY EXAMINATION: ICD-10-CM

## 2022-11-18 DIAGNOSIS — L89.154 SACRAL DECUBITUS ULCER, STAGE IV (HCC): Primary | ICD-10-CM

## 2022-11-18 DIAGNOSIS — L89.622 PRESSURE INJURY OF LEFT HEEL, STAGE 2 (HCC): ICD-10-CM

## 2022-11-18 DIAGNOSIS — S91.104A OPEN WOUND OF SECOND TOE, RIGHT, INITIAL ENCOUNTER: ICD-10-CM

## 2022-11-18 DIAGNOSIS — L89.623 PRESSURE INJURY OF LEFT HEEL, STAGE 3 (HCC): ICD-10-CM

## 2022-11-18 DIAGNOSIS — L89.890 PRESSURE INJURY OF LEFT LEG, UNSTAGEABLE (HCC): ICD-10-CM

## 2022-11-18 DIAGNOSIS — Z01.810 PRE-OPERATIVE CARDIOVASCULAR EXAMINATION: ICD-10-CM

## 2022-11-18 DIAGNOSIS — T14.8XXA DEEP TISSUE INJURY: ICD-10-CM

## 2022-11-18 DIAGNOSIS — S81.802D WOUND OF LEFT LOWER EXTREMITY, SUBSEQUENT ENCOUNTER: ICD-10-CM

## 2022-11-18 DIAGNOSIS — S91.209A AVULSION OF TOENAIL, INITIAL ENCOUNTER: ICD-10-CM

## 2022-11-18 LAB
ANION GAP SERPL CALC-SCNC: 11 MMOL/L (ref 7–16)
APTT PPP: 48.9 SEC (ref 24.7–36)
BASOPHILS # BLD AUTO: 0.4 % (ref 0–1.8)
BASOPHILS # BLD: 0.02 K/UL (ref 0–0.12)
BUN SERPL-MCNC: 13 MG/DL (ref 8–22)
CALCIUM SERPL-MCNC: 9.3 MG/DL (ref 8.5–10.5)
CHLORIDE SERPL-SCNC: 106 MMOL/L (ref 96–112)
CO2 SERPL-SCNC: 23 MMOL/L (ref 20–33)
CREAT SERPL-MCNC: 0.53 MG/DL (ref 0.5–1.4)
EKG IMPRESSION: NORMAL
EOSINOPHIL # BLD AUTO: 0.17 K/UL (ref 0–0.51)
EOSINOPHIL NFR BLD: 3.2 % (ref 0–6.9)
ERYTHROCYTE [DISTWIDTH] IN BLOOD BY AUTOMATED COUNT: 50 FL (ref 35.9–50)
GFR SERPLBLD CREATININE-BSD FMLA CKD-EPI: 106 ML/MIN/1.73 M 2
GLUCOSE SERPL-MCNC: 93 MG/DL (ref 65–99)
HCT VFR BLD AUTO: 42.9 % (ref 42–52)
HGB BLD-MCNC: 14.1 G/DL (ref 14–18)
IMM GRANULOCYTES # BLD AUTO: 0.03 K/UL (ref 0–0.11)
IMM GRANULOCYTES NFR BLD AUTO: 0.6 % (ref 0–0.9)
INR PPP: 1.59 (ref 0.87–1.13)
LYMPHOCYTES # BLD AUTO: 1.17 K/UL (ref 1–4.8)
LYMPHOCYTES NFR BLD: 22 % (ref 22–41)
MCH RBC QN AUTO: 33.8 PG (ref 27–33)
MCHC RBC AUTO-ENTMCNC: 32.9 G/DL (ref 33.7–35.3)
MCV RBC AUTO: 102.9 FL (ref 81.4–97.8)
MONOCYTES # BLD AUTO: 0.58 K/UL (ref 0–0.85)
MONOCYTES NFR BLD AUTO: 10.9 % (ref 0–13.4)
NEUTROPHILS # BLD AUTO: 3.35 K/UL (ref 1.82–7.42)
NEUTROPHILS NFR BLD: 62.9 % (ref 44–72)
NRBC # BLD AUTO: 0 K/UL
NRBC BLD-RTO: 0 /100 WBC
PLATELET # BLD AUTO: 161 K/UL (ref 164–446)
PMV BLD AUTO: 9.1 FL (ref 9–12.9)
POTASSIUM SERPL-SCNC: 4.3 MMOL/L (ref 3.6–5.5)
PROTHROMBIN TIME: 18.7 SEC (ref 12–14.6)
RBC # BLD AUTO: 4.17 M/UL (ref 4.7–6.1)
SODIUM SERPL-SCNC: 140 MMOL/L (ref 135–145)
WBC # BLD AUTO: 5.3 K/UL (ref 4.8–10.8)

## 2022-11-18 PROCEDURE — 11043 DBRDMT MUSC&/FSCA 1ST 20/<: CPT

## 2022-11-18 PROCEDURE — 80048 BASIC METABOLIC PNL TOTAL CA: CPT

## 2022-11-18 PROCEDURE — 11043 DBRDMT MUSC&/FSCA 1ST 20/<: CPT | Performed by: STUDENT IN AN ORGANIZED HEALTH CARE EDUCATION/TRAINING PROGRAM

## 2022-11-18 PROCEDURE — 85610 PROTHROMBIN TIME: CPT

## 2022-11-18 PROCEDURE — 93005 ELECTROCARDIOGRAM TRACING: CPT

## 2022-11-18 PROCEDURE — 93010 ELECTROCARDIOGRAM REPORT: CPT | Performed by: INTERNAL MEDICINE

## 2022-11-18 PROCEDURE — 85730 THROMBOPLASTIN TIME PARTIAL: CPT

## 2022-11-18 PROCEDURE — 36415 COLL VENOUS BLD VENIPUNCTURE: CPT

## 2022-11-18 PROCEDURE — 85025 COMPLETE CBC W/AUTO DIFF WBC: CPT

## 2022-11-18 PROCEDURE — 99214 OFFICE O/P EST MOD 30 MIN: CPT | Mod: 25

## 2022-11-18 RX ORDER — AMOXICILLIN AND CLAVULANATE POTASSIUM 500; 125 MG/1; MG/1
1 TABLET, FILM COATED ORAL 2 TIMES DAILY
COMMUNITY
End: 2022-12-29

## 2022-11-18 ASSESSMENT — FIBROSIS 4 INDEX: FIB4 SCORE: 2.76

## 2022-11-18 NOTE — TELEPHONE ENCOUNTER
Elías Merino M.D.  You 7 minutes ago (1:10 PM)     Should be OK but we will plan on CBC day of the procedure in preop

## 2022-11-18 NOTE — PROGRESS NOTES
Provider Encounter- Pressure Injury        HISTORY OF PRESENT ILLNESS  Wound History:    START OF CARE IN CLINIC: 5/3/2022    REFERRING PROVIDER: Sahara Mendez      WOUNDS- Pressure Injury, Sacrococcygeal, stage IV                                                             Surgical wound dehiscence, left medial lower leg-status post surgical I&D of stage IV pressure injury and           biologic application                    Pressure injury, DTI, left posterior lower leg-first observed in clinic 7/29/2022       Pressure injury stage II L heel - first noted 10/21     Right 2nd toe avulsion - first noted 10/21     Skin tag left posterior ear - first noted 10/21     HISTORY: Patient with history of incomplete quadriplegia referred to [facility] for treatment of a stage IV pressure injury.  He has a history of previous pressure injuries to this area, and underwent muscle flaps in 2019, and then again in 2020.  He was seen in the wound clinic in November 2021 for an ulcer proximal from his current ulcer, and pressure injuries to his left posterior lower leg and left heel.  At that time, it was discovered that the patient had retained VAC foam embedded in the wound bed of the sacral wound.  Attempts were made to get him back to his plastic surgeon, though unsuccessful.  In January he underwent surgical removal of VAC sponge along with excisional debridement of his sacral wound by Dr. Chaves.  After the surgery, his wound went on to heal without incident.   In early April 2022, his home health nurse noted a new sacral ulcer, below the previous ulcer which quickly tripled in size over the following weeks.  The ulcer to his left medial lower leg had also deteriorated, with bone visible at the base..  He was hospitalized from 4/22 until 4/27/2022 and underwent surgery with Dr. Raman on 4/26 for irrigation and debridement of multiple compartments of the left lower extremity, bone excision, and complex closure of chronic wound  using biologic skin substitute.   His sacrococcygeal wound was not surgically addressed during this admission.  He was discharged back to his group home, with home health, and referral to outpatient wound clinic for his sacral wound.  He was instructed to follow-up with his surgeon for his lower leg wound.       Postoperatively, the left medial lower leg incision dehisced.  He was seen by his surgeon at [facility] on 5/11.  The surgeon opted to leave remaining sutures in place, and refer him to the wound clinic for treatment of this wound.   Treatment of this wound was initiated in clinic on 5/12.  During this visit was also noted that his heel DTI had resolved, but that he had a new pressure injury to his left posterior lower extremity.     A new pressure injury was noted to patient's right upper buttock/lower back on 5/20/2022.  Wound was linear in shape, skin discolored but intact.     Abrasion noted to left anterior lower leg.  First observed in clinic on 7/22/2022.  Patient states he bumped his leg into his food tray.     Small DTI noted to patient's left lateral lower leg on 7/29/2022.  Skin intact but discolored.     Large area of deep tissue injury noted to patient's left exterior lower leg.  Patient denied any trauma to this area.  Skin intact.  Wound documented.    Pertinent Medical History: Incomplete quadriplegia, history of stage IV pressure injuries, history of flap procedures to sacral pressure injuries, osteomyelitis, obesity, colostomy in place   Contributing factors: Immobility and Obesity, impaired sensation    Personal support: Attendant-staff at care home and home health nursing    TOBACCO USE:   Former smoker, quit in 1977.  Never used smokeless tobacco    Patient's problem list, allergies, and current medications reviewed and updated in Epic    Interval History:  Interval History thinned 7/29/2022.  Please see previous notes for complete interval history.     7/29/2022 : Clinic visit with Kelly  ADILSON Sandy, HOLDEN, IMAN, CFCN.  Anjum states he continues to do well.  He was able to go to [employer] yesterday, spent today [place].  His left lower extremity wound has deteriorated, presents today with significant odor and deterioration of tissue.  VAC held from this wound today after debridement.  I also did not apply biologic today.   Sacral wound about the same in area, though some evidence of increasing granulation.  No evidence of infection.  Small abrasion to his left lower leg appears to be improving.  He has a new small DTI to his left lateral lower leg, skin intact but discolored.    8/5/2022 : Clinic visit with ADILSON Arthur, HOLDEN, ALEXN, CFCN.  Anjum continues to feel well.  His left lower wound has improved with VAC hold, will discontinue from this wound altogether.  I did not apply biologic to this wound today given its current improvement.  DTI to posterior left leg is a bit darker today, skin still intact.  Patient states he has been wearing his heel float boot at all times.  Sacral wound with increased granulation, slight decrease in depth, bone still exposed.    8/12/2022 : Clinic visit with ADILSON Arthur, HOLDEN, IMAN, CFTRACI.   Anjum states he is feeling well.  His left lower leg wound continues to improve without the use of VAC or biologic.  The deep tissue injury to his posterior leg is gradually improving, area decreasing.  The abrasion to his left shin is now open, was covered with scab last week.  Sacral wound improving slowly, increase granulation, slight decrease in area.  Home health had messaged that they were concerned for infection due to odor.  I did not appreciate any significant odor today.  We did add Puracyn gel to the wound bed prior to reapplying VAC.  Anjum continues to spend most of his day up in his wheelchair, though tries to reposition frequently, and is wearing heel float boots bilaterally at all times.    8/19/2022 : Clinic visit with ADILSON Arthur,  HOLDEN, IMAN, ILLIYA.   Turk states he is in his usual state of health, feeling well overall.  All of his wounds are progressing, though very slowly.  He continues to spend most of the day up in his wheelchair.  He does know to shift his weight frequently, and has an appropriate pressure relief cushion in his chair.    He was seen by [facility] earlier this week, for complaints of leakage around his suprapubic catheter and fever.  He was prescribed Keflex, and his catheter was changed    8/26/2022: Clinic visit with ADILSON Flores.  Patient reports overall he is feeling well denies any fever or chills.  Patient reports that he is continuously wearing Prevalon boots to offload bilateral lower extremities.  The left medial lower extremity wound is quite boggy with a small amount of turbulent fluid which was expressed with minimal palpation to the periwound area.  There is no foul-smelling odor emanating from the wound bed there is no erythema or edema.    9/2/2022 : Clinic visit with ADILSON Arthur, HOLDEN, IMAN, LILIYA.   States he is feeling well overall, denies fevers, chills, nausea, vomiting, cough or shortness of breath.  Unfortunately, his wounds are not progressing significantly.  Leg wound presents with boggy tissue, and probes to bone.  Area and depth of sacral wound have not changed significantly, bone still exposed.  Previously has been seen by several different plastic surgeons, including Dr. Rodriguez, Dr. Nicolas,  Dr. Mott, Dr. Chaves, he was also referred to Dr. Browne at [facility].  None of these surgeons have agreed to see him thus far.    9/9/2022: Clinic visit with Steve Hodges MD. Patient reports doing ok. Denies any changes to health or symptoms of infection. Wounds are not progressing, leg wound can be probed to bone and tissue is boggy. Wound VAC to sacrum with bone still exposed. He reports previously being treated for OM for 6 weeks without resolution. Discussed possibly getting  imaging to eval for OM, however with chronic OM there is limited to no role for prolonged Abx therapy per IDSA guidelines.    9/16/2022: Clinic visit with Steve Hodges MD. Patient reports feeling his normal state of health, denies any fevers or chills. His medial left leg wound is boggy and I was able to express some tan fluid concerning for purulence. Will take wound culture and order abx as appropriate for the results. Will not start empirical Abx as no evidence of cellulitis and his history of multiple rounds of Abx in the past. Will order CT scan of lower extremity and sacrum to further evaluate areas. Sacrum still to bone, had previous history of some kind of retained material in wound bed, will obtain the CT scan to evaluate.    9/23/2022: Clinic visit with Steve Hodges MD. Patient reports doing ok, denies any fevers or chills. Patient is taking Augmentin, reports tolerating well without any side effects. Tissue quality leg wound remains poor, still with some purulence able to be expressed but less than last week. Patient has CT scan planned for 10/3 and had labs today to ensure adequate renal function. Sacrum measuring smaller, with larger undermining.    9/30/2022 : Clinic visit with ADILSON Arthur, HOLDEN, IMAN, LILIYA.   Anjum states that he is feeling well.  He is still taking Augmentin, reports no adverse effects.  Purulent drainage still noted from lower leg wound, with boggy, dusky tissue in the wound bed.  We will extend Augmentin and additional 2 weeks.   Opening of sacral wound has decreased since last assessment, increase granulation within the wound bed noted, however bone still exposed.  His CT is scheduled for Monday 10/3, of both sacrum and his lower leg.    10/7/2022 : Clinic visit with ADILSON Arthur, HOLDEN, IMAN, LILIYA.   Anjum states he is feeling well, offers no complaints.  CT results discussed with him in clinic today, as well as recommendations from interdisciplinary rounds  a few days ago.  He was agreeable to allowing for today I&D of his leg wound today.  Will place referral to Dr. aSini for consideration of plastic intervention.  Patient states that if he were to undergo a flap procedure, he would be willing to be admitted to a skilled nursing facility long enough to allow for healing.   By removed a small chip of bone from leg wound during I&D.  Tissue is dusky and friable.  Will refer patient back to Dr. Raman, orthopedics, for reevaluation.    10/14/2022: Clinic visit with Steve Hodges MD. Patient reports doing well, denies any fevers or chills. Patient reports that his home health nurse feels leg wound improving, appears unchanged and probes to bone. CT scan of leg without definitive evidence of OM, was referred back to orthopedics with Dr. Raman but does not have appointment yet. He was also referred to Dr. Saini, does not have appointment.    10/21/2022: Clinic visit with Steve Hodges MD. Patient reports doing well, denies any symptoms of infection. He presents with left posterior ear skin tag and asked if I could address in clinic as it causes him discomfort when wearing mask. No significant change to left lower extremity wound or sacral wound. He does report having appointment with Dr. Raman 11/14 and with Dr. Saini sometime in November. Patient was found to have near complete avulsion to right 2nd toenail, he does not recall any specific trauma but is insensate. He was also noted to have new stage II pressure injury left heel despite wearing prevalon boots. Counseled to continue offloading heel as much as possible, can place pillow under leg to completely offload heel.    11/4/2022: Clinic visit with Steve Hodges MD. Patient reports doing well, denies any fevers or chills and generally feels well. Patient had episode of Afib and was restarted on Xarelto by cardiology, reports plan for whatchman procedure at some point in future. When assessing left lower  extremity wound, he was noted to have brisk venous bleeding with removal of dressing. Was not debrided and pressure dressing applied with hemostasis achieved. Left heel has increased in depth to stage III pressure injury despite wearing prevalon boots 24 hours a day, his Meliplex was not in place today. We discussed further offloading with pillow under left leg to allow for heel to be frefloated off mattress. Sacrum largely unchanged.   Reports appointment with Dr. Saini next week and appointment with Dr. Raman on 11/14.    11/11/2022: Clinic visit with Steve Hodges MD. Patient reports feeling well, denies any fevers or chills. Wound to his left leg appear worsening, now two distinct wounds that probe to bone and communicate. He has appointment with Dr. Raman next week. Counseled him to possibly consider amputation, reports that he has been discussing with Dr. Raman.   Sacrum remains largely unchanged. He is being evaluated for possible closure by Dr. Saini. I discussed case with Dr. aSini and he will evaluate wound care pictures to determine if he is candidate for surgical intervention.    11/18/2022: Clinic visit with Steve Hodges MD. Patient reports doing well. He has scheduled RICHARD and probable watchman device placement by interventional cardiology on Tuesday next week, he expects to be admitted through Wednesday morning. I called and coordinated with inpatient wound care team to have wound VAC and dressings changed while admitted. Sacral wound remains stable, measures roughly the same. Has not had follow up with Dr. Saini.   Patient was seen by Dr. Raman, as there does not appear to be acute infection he recommends continuing wound care but will reevaluate patient in 6 weeks and if no improvement may proceed with surgical I&D. Tissue quality remains poor and friable. Wound probes to bone.      REVIEW OF SYSTEMS:   Unchanged from previous wound clinic visit on 11/11/2022, except otherwise noted  above.    PHYSICAL EXAMINATION:   BP (!) 163/85 (BP Location: Left arm, Patient Position: Sitting) Comment: RN notified  Pulse (!) 48   Temp 36.6 °C (97.9 °F) (Temporal)   Resp 18   SpO2 96%   Physical Exam  Constitutional:       Appearance: He is obese.   Cardiovascular:      Rate and Rhythm: Bradycardia present.   Pulmonary:      Effort: Pulmonary effort is normal. No respiratory distress.      Breath sounds: No wheezing.   Skin:     Comments: Stage IV coccygeal pressure injury - Unchanged, large undermining with exposed bone. Minimal granulation tissue.    Full-thickness wound to left medial lower leg - Largely unchanged. Boggy friable tissue, probes to bone. No purulence.    Stage III pressure injury left posterior heel - Improving    See flow sheet for details   Neurological:      Mental Status: He is alert and oriented to person, place, and time.       WOUND ASSESSMENT  Wound 04/11/22 Full Thickness Wound Leg Medial Left --Left Medial Lower Leg Posterior (Active)   Wound Image    11/18/22 1300   Site Assessment Red;Purple;Boggy;Fragile 11/18/22 1300   Periwound Assessment Edema;Fragile;Scar tissue 11/18/22 1300   Margins Unattached edges 11/18/22 1300   Drainage Amount Moderate 11/18/22 1300   Drainage Description Serosanguineous 11/18/22 1300   Treatments Cleansed;Provider debridement;Site care 11/18/22 1300   Wound Cleansing Normal Saline Irrigation 11/18/22 1300   Periwound Protectant Skin Protectant Wipes to Periwound;Barrier Paste 11/18/22 1300   Dressing Cleansing/Solutions Not Applicable 11/18/22 1300   Dressing Options Hydrofiber Silver Strip;Hydrofiber Silver;Mepilex;Tubigrip 11/18/22 1300   Dressing Changed Changed 11/11/22 1300   Dressing Change/Treatment Frequency Monday, Wednesday, Friday, and As Needed 11/18/22 1300   Non-staged Wound Description Full thickness 11/18/22 1300   Wound Length (cm) 3 cm 11/18/22 1300   Wound Width (cm) 2.2 cm 11/18/22 1300   Wound Depth (cm) 0.8 cm 11/18/22 1300    Wound Surface Area (cm^2) 6.6 cm^2 11/18/22 1300   Wound Volume (cm^3) 5.28 cm^3 11/18/22 1300   Post-Procedure Length (cm) 3 cm 11/18/22 1300   Post-Procedure Width (cm) 2.3 cm 11/18/22 1300   Post-Procedure Depth (cm) 0.9 cm 11/18/22 1300   Post-Procedure Surface Area (cm^2) 6.9 cm^2 11/18/22 1300   Post-Procedure Volume (cm^3) 6.21 cm^3 11/18/22 1300   Wound Healing % -[id number] 11/18/22 1300   Tunneling (cm) 0 cm 11/18/22 1300   Tunneling Clock Position of Wound 12 07/22/22 1400   Undermining (cm) 0 cm 11/18/22 1300   Wound Odor None 11/18/22 1300   Exposed Structures Bone 11/18/22 1300   Number of days: 222       Wound 04/22/22 Pressure Injury Sacrum POA stage 4 (Active)   Wound Image    11/18/22 1300   Site Assessment Red;Yellow;Other (Comment) 11/18/22 1300   Periwound Assessment Scar tissue;Fragile 11/18/22 1300   Margins Unattached edges 11/18/22 1300   Drainage Amount Moderate 11/18/22 1300   Drainage Description Serosanguineous 11/18/22 1300   Treatments Cleansed;Provider debridement 11/18/22 1300   Wound Cleansing Normal Saline Irrigation 11/18/22 1300   Periwound Protectant Skin Protectant Wipes to Periwound;Benzoin;Paste Ring;Drape 11/18/22 1300   Dressing Cleansing/Solutions Not Applicable 11/18/22 1300   Dressing Options Wound Vac;Mepilex 11/18/22 1300   Dressing Changed Changed 11/11/22 1300   Dressing Change/Treatment Frequency Monday, Wednesday, Friday, and As Needed 11/18/22 1300   WOUND NURSE ONLY - Pressure Injury Stage 4 11/18/22 1300   Wound Length (cm) 1.5 cm 11/18/22 1300   Wound Width (cm) 2.1 cm 11/18/22 1300   Wound Depth (cm) 1.5 cm 11/18/22 1300   Wound Surface Area (cm^2) 3.15 cm^2 11/18/22 1300   Wound Volume (cm^3) 4.725 cm^3 11/18/22 1300   Post-Procedure Length (cm) 1.6 cm 11/18/22 1300   Post-Procedure Width (cm) 2.1 cm 11/18/22 1300   Post-Procedure Depth (cm) 1.5 cm 11/18/22 1300   Post-Procedure Surface Area (cm^2) 3.36 cm^2 11/18/22 1300   Post-Procedure Volume (cm^3) 5.04 cm^3  11/18/22 1300   Wound Healing % -97 11/18/22 1300   Tunneling (cm) 0 cm 11/18/22 1300   Tunneling Clock Position of Wound 9 11/04/22 1300   Undermining (cm) 2 cm 11/18/22 1300   Undermining of Wound, 1st Location From 9 o'clock;To 3 o'clock 11/18/22 1300   Undermining (cm) - 2nd location 3 cm 07/15/22 1400   Undermining of Wound, 2nd Location From 9 o'clock;To 11 o'clock 07/15/22 1400   Undermining (cm) - 3rd location 3.2 cm 06/24/22 1000   Undermining of Wound, 3rd Location From 7 o'clock;To 11 o'clock 06/24/22 1000   Wound Odor None 11/18/22 1300   Exposed Structures Bone 11/18/22 1300   Number of days: 211       Wound 07/22/22 Traumatic Leg Anterior Left (Active)   Wound Image   11/18/22 1300   Site Assessment Purple 11/18/22 1300   Periwound Assessment Dry;Intact 11/18/22 1300   Margins Attached edges 08/19/22 1400   Drainage Amount None 11/18/22 1300   Drainage Description Serosanguineous 08/19/22 1400   Treatments Cleansed;Site care 11/18/22 1300   Wound Cleansing Foam Cleanser/Washcloth 11/18/22 1300   Periwound Protectant Skin Protectant Wipes to Periwound 11/18/22 1300   Dressing Cleansing/Solutions Not Applicable 11/18/22 1300   Dressing Options Silicone Adhesive Foam;Tubigrip 11/18/22 1300   Dressing Changed Changed 11/11/22 1300   Dressing Change/Treatment Frequency Monday, Wednesday, Friday, and As Needed 11/11/22 1300   WOUND NURSE ONLY - Pressure Injury Stage DTPI 11/18/22 1300   Non-staged Wound Description Full thickness 08/12/22 1300   Wound Length (cm) 0.4 cm 08/19/22 1400   Wound Width (cm) 0.4 cm 08/19/22 1400   Wound Depth (cm) 0.1 cm 08/19/22 1400   Wound Surface Area (cm^2) 0.16 cm^2 08/19/22 1400   Wound Volume (cm^3) 0.016 cm^3 08/19/22 1400   Post-Procedure Length (cm) 0.9 cm 08/12/22 1300   Post-Procedure Width (cm) 0.5 cm 08/12/22 1300   Post-Procedure Depth (cm) 0.1 cm 08/12/22 1300   Post-Procedure Surface Area (cm^2) 0.45 cm^2 08/12/22 1300   Post-Procedure Volume (cm^3) 0.045 cm^3  08/12/22 1300   Tunneling (cm) 0 cm 11/18/22 1300   Undermining (cm) 0 cm 11/18/22 1300   Wound Odor None 11/18/22 1300   Exposed Structures None 11/18/22 1300   Number of days: 120       Wound 07/29/22 Pressure Injury Leg Posterior;Lateral Left (Active)   Wound Image   11/18/22 1300   Site Assessment Purple 11/18/22 1300   Periwound Assessment Dry;Intact 11/18/22 1300   Drainage Amount None 11/18/22 1300   Drainage Description Sanguineous 08/12/22 1300   Treatments Cleansed;Site care 11/18/22 1300   Wound Cleansing Foam Cleanser/Washcloth 11/18/22 1300   Periwound Protectant Skin Protectant Wipes to Periwound 11/18/22 1300   Dressing Cleansing/Solutions Not Applicable 11/18/22 1300   Dressing Options Mepilex;Tubigrip 11/18/22 1300   Dressing Changed Changed 11/11/22 1300   Dressing Change/Treatment Frequency Monday, Wednesday, Friday, and As Needed 11/18/22 1300   WOUND NURSE ONLY - Pressure Injury Stage DTPI 11/18/22 1300   Wound Length (cm) 0.1 cm 08/12/22 1300   Wound Width (cm) 0.1 cm 08/12/22 1300   Wound Depth (cm) 0.1 cm 08/12/22 1300   Wound Surface Area (cm^2) 0.01 cm^2 08/12/22 1300   Wound Volume (cm^3) 0.001 cm^3 08/12/22 1300   Tunneling (cm) 0 cm 11/18/22 1300   Undermining (cm) 0 cm 11/18/22 1300   Wound Odor None 11/18/22 1300   Exposed Structures None 11/18/22 1300   Number of days: 113       Wound 10/21/22 Pressure Injury Left Posterior Heel stage 3 (Active)   Wound Image   11/18/22 1300   Site Assessment Pink;Red 11/18/22 1300   Periwound Assessment Fragile;Scar tissue 11/18/22 1300   Margins Attached edges 11/18/22 1300   Drainage Amount Small 11/18/22 1300   Drainage Description Serosanguineous 11/18/22 1300   Treatments Cleansed;Site care;Offloading 11/18/22 1300   Wound Cleansing Normal Saline Irrigation 11/18/22 1300   Periwound Protectant Skin Protectant Wipes to Periwound;Barrier Paste 11/18/22 1300   Dressing Cleansing/Solutions Not Applicable 11/18/22 1300   Dressing Options Hydrofiber  Silver;Mepilex Heel;Tubigrip 11/18/22 1300   Dressing Changed Changed 11/11/22 1300   Dressing Status Clean;Dry;Intact 11/04/22 1300   Dressing Change/Treatment Frequency Monday, Wednesday, Friday, and As Needed 11/18/22 1300   WOUND NURSE ONLY - Pressure Injury Stage 3 11/18/22 1300   Non-staged Wound Description Not applicable 11/18/22 1300   Wound Length (cm) 2 cm 11/18/22 1300   Wound Width (cm) 2 cm 11/18/22 1300   Wound Depth (cm) 0.1 cm 11/18/22 1300   Wound Surface Area (cm^2) 4 cm^2 11/18/22 1300   Wound Volume (cm^3) 0.4 cm^3 11/18/22 1300   Post-Procedure Length (cm) 2 cm 11/11/22 1300   Post-Procedure Width (cm) 2.3 cm 11/11/22 1300   Post-Procedure Depth (cm) 0.2 cm 11/11/22 1300   Post-Procedure Surface Area (cm^2) 4.6 cm^2 11/11/22 1300   Post-Procedure Volume (cm^3) 0.92 cm^3 11/11/22 1300   Wound Healing % -2567 11/18/22 1300   Tunneling (cm) 0 cm 11/18/22 1300   Undermining (cm) 0 cm 11/18/22 1300   Wound Odor None 11/18/22 1300   Exposed Structures None 11/18/22 1300   Number of days: 29       Wound 11/11/22 LLE Medial Anterior (Active)   Wound Image    11/18/22 1300   Site Assessment Red;Boggy;Fragile 11/18/22 1300   Periwound Assessment Fragile;Scar tissue 11/18/22 1300   Margins Unattached edges 11/18/22 1300   Drainage Amount Moderate 11/18/22 1300   Drainage Description Serosanguineous 11/18/22 1300   Treatments Cleansed;Provider debridement;Site care 11/18/22 1300   Wound Cleansing Normal Saline Irrigation 11/18/22 1300   Periwound Protectant Skin Protectant Wipes to Periwound;Barrier Paste 11/18/22 1300   Dressing Cleansing/Solutions Not Applicable 11/18/22 1300   Dressing Options Hydrofiber Silver Strip;Hydrofiber Silver;Mepilex;Tubigrip 11/18/22 1300   Dressing Changed New 11/11/22 1300   Dressing Change/Treatment Frequency Monday, Wednesday, Friday, and As Needed 11/18/22 1300   Non-staged Wound Description Full thickness 11/18/22 1300   Wound Length (cm) 0.9 cm 11/18/22 1300   Wound  Width (cm) 1 cm 11/18/22 1300   Wound Depth (cm) 0.5 cm 11/18/22 1300   Wound Surface Area (cm^2) 0.9 cm^2 11/18/22 1300   Wound Volume (cm^3) 0.45 cm^3 11/18/22 1300   Post-Procedure Length (cm) 1.1 cm 11/18/22 1300   Post-Procedure Width (cm) 1 cm 11/18/22 1300   Post-Procedure Depth (cm) 0.8 cm 11/18/22 1300   Post-Procedure Surface Area (cm^2) 1.1 cm^2 11/18/22 1300   Post-Procedure Volume (cm^3) 0.88 cm^3 11/18/22 1300   Wound Healing % 33 11/18/22 1300   Tunneling (cm) 0 cm 11/18/22 1300   Tunneling Clock Position of Wound 9 11/11/22 1300   Undermining (cm) 0 cm 11/18/22 1300   Wound Odor None 11/18/22 1300   Exposed Structures Bone 11/18/22 1300   Number of days: 8     PROCEDURE: Excisional debridement of sacrococcygeal wound  -Declined lidocaine as he is insensate  -Curette used to debride wound beds.  Excisional debridement was performed to remove devitalized tissue until healthy, bleeding tissue was visualized.  Debridement was carried into the undermined areas of the sacral wound.  Total area debrided approximately 3.36cm² (including into wound undermining).  Deepest level of debridement was to muscle / fascia.  -Bleeding controlled with manual pressure.    -Wound care completed by wound RN, refer to flowsheet  -Patient tolerated the procedure well, without c/o pain or discomfort.       BIOLOGIC LOG  5/20/2022-first application.  PRODUCT: EpiCord 2 x 3 cm . PRODUCT #[id number]; EXPIRES: 2026-12-01 5/27/2022- second application.  PRODUCT: EpiCordEX 2 x 3 cm . PRODUCT #[id number]; EXPIRES: 2026-09-01    6/3/2022- third application.  PRODUCT: EpiCordEX 2 x 3 cm . PRODUCT #[id number]; EXPIRES: 2026-10-01    6/10/2022- fourth application.  PRODUCT: EpiCordEX 2 x 3 cm . PRODUCT #[id number]; EXPIRES: 2026-09-01 6/17/2022- fifth application.  PRODUCT: EpiCordEX 2 x 3 cm . PRODUCT #[id number]; EXPIRES: 2027-02-01 6/24/2022- sixth application.  PRODUCT: EpiCordEX 2 x  3 cm . PRODUCT #[id number]; EXPIRES: 2027-02-01 07/01/22: Seventh application.  Product: Epicort Dx 2.0 x 3.0 cm reorder number [id number]; product number [id number]; expires 2027-02-01 7/22/2022- eighth application.  PRODUCT: EpiCord 2 x 3 cm, reorder #[id number]. PRODUCT #[id number]; EXPIRES: 2027-02-01      Pertinent Labs and Diagnostics:    Labs:     A1c: No results found for: HBA1C     [facility] results, 7/1/2022 (under media tab)    CRP 13    ESR 31      IMAGING:     10/3/2022-CT of pelvis with contrast  IMPRESSION:     1.  Posterior soft tissue sacral wound and 1.5 x 3.7 cm abscess.   2.  Progressive destruction of the distal sacrum and proximal coccyx. Sclerosis and irregularity of the distal sacrum consistent with osteomyelitis.   3.  Old wound within the soft tissues posterior to the distal left SI joint with possible fistulous communication.    10/3/2022-CT of tib-fib with and without contrast, with reconstruction  IMPRESSION:     1.  Old fractures of the left tibia and fibula with extensive callus formation and bony bridging as well as extensive dystrophic calcifications. Similar to previous.   2.  Distal medial soft tissue wounds with ill-defined superficial in the soft tissue thickening and fluid collections suggestive of phlegmon. No organized abscess identified.   3.  No definite osteomyelitis.   4.  Arthritic changes.        VASCULAR STUDIES: No results found.    LAST  WOUND CULTURE:   Lab Results   Component Value Date/Time    CULTRSULT No growth at 48 hours. 09/22/2022 01:00 PM          ASSESSMENT AND PLAN:     1. Sacral decubitus ulcer, stage IV ([facility])  Comments: Ulcer first noted in early April 2022 as small open area which quickly enlarged.  This ulcer is present distal from previous sacral ulcer which healed after surgery in January.  Patient has history of flap reconstruction x2 to this area.    11/18/2022: Wound largely unchanged. Undermining remains, wound remains stalled.   CT shows progressive destruction of distal sacrum and proximal coccyx.  -Excisional debridement of wound in clinic today, medically necessary to promote wound healing  -Patient is to return to clinic weekly for assessment and debridement   -Home health to continue to change the wound VAC dressing 2 times per week in between clinic visits.  -Dr. Saini following case and to evaluate for possible surgical intervention, encouraged patient to make appointment.  -Healing of this wound is further complicated by the patient's multiple comorbidities, exposed bone, and significant pressure from sitting in his wheelchair    Wound care: NPWT at -125 mmHg to accelerate granulation, change 3 times per week.    2. Postoperative wound dehiscence, subsequent encounter  Comments: On 4/26 patient underwent irrigation and debridement of multiple compartments of the left lower extremity states for pressure injury, with bone excision, and complex closure of chronic wound using biologic skin substitute.  During postop visit in surgeons office on 5/11, surgical site was noted to be dehisced.  Patient was referred to wound clinic for management.    11/18/2022: Wound largely unchanged with boggy friable tissue. No purulence.  - Was not debrided  -Patient to return to clinic weekly for assessment and debridement as needed  -Home health to change dressing 1-2 times per week in between clinic visits   -Due to deterioration of wound, patient was referred to Dr. Raman at [facility] to evaluate. He recommends trying biologic vs wound VAC and will re-evaluate patient in 6 weeks, if no improvement may consider surgical I&D.  -Consider resuming biologic and/or VAC    Wound care: Silver Hydrofiber to manage exudate and bioburden, Mepilex, Tubigrip D to manage edema    3. Deep tissue injury  4. Pressure injury of left leg, unstageable (HCC)  Comments: DTI to posterior left lower leg first observed in clinic 7/29/2022.  Patient does not know how it  started, had been wearing heel float boot consistently.    11/18/2022: Remains stable.  Skin still intact  -Continue to monitor for any deterioration in tissue quality  -Patient is currently wearing Prevalon boots to help prevent pressure injuries to bilateral lower extremities    Wound care: Mepilex, Tubigrip D    5. Pressure injury of left heel, stage 3 ([facility])  Comments: First noted in clinic on 10/21/2022, originally noted to be stage II    11/18/2022  - Improving. Did not require debridement  - He reports that he continues to wear prevalon boots  - Counseled on offloading  - Recommend using pillow under calf to offload heel while in bed as woudn has worsened despite use of prevalon boot.     Wound Care: Hydrofiber silver, Mepilex    6. Open wound of second toe, right, initial encounter  7. Avulsion of toenail, initial encounter  Comments: First noted in clinic on 10/21/2022  - Resolved    8. Quadriplegia, C5-C7, complete ([facility])  Comments: Complicating factor.  Impaired mobility and sensation  -Patient is still spending 7-8 hours/day up in his wheelchair, though he does have appropriate offloading cushion, and knows to reposition frequently.  Wears heel float boots bilaterally at all times      Please note that this note may have been created using voice recognition software. I have worked with technical experts from [employer] to optimize the interface.  I have made every reasonable attempt to correct obvious errors, but there may be errors of grammar and possibly content that I did not discover before finalizing the note.

## 2022-11-18 NOTE — TELEPHONE ENCOUNTER
Phone Number Called: [phone]    Call outcome: Left detailed message for patient. Informed to call back with any additional questions.    Message: Called to inform patient that per [facility] there is nothing he needs to do between now and his procedure.     To [place]/Mi: Please notify pre-op of CBC.

## 2022-11-18 NOTE — PATIENT INSTRUCTIONS
-Keep dressings clean and dry. Change dressings once between wound clinic visits, and if they become over saturated, soiled or fall off.     -Avoid prolonged standing or sitting without elevating your legs.    -Remove your compression garments if you have severe pain, severe swelling, numbness, color change, or temperature change in your toes. If you need to remove your compression garments, do so by unrolling them. Do not cut the compression garments off, this is to prevent cutting yourself on accident.    -Should you experience any significant changes in your wound(s), such as signs of infection (increasing redness, swelling, localized heat, increased pain, fever > 101 F, chills) or have any questions regarding your home care instructions, please contact the wound center at [phone]. If after hours, contact your primary care physician or go to the hospital emergency room.     -If you are 5 or more minutes late for an appointment, we reserve the right to cancel and reschedule that appointment. Additionally, if you are habitually late or not showing (3 late cancellations and/or no shows), we reserve the right to cancel your remaining appointments and it will be your responsibility to obtain a new referral if services are still needed.

## 2022-11-18 NOTE — TELEPHONE ENCOUNTER
Caller: Osvaldo Pate    Topic/issue: UTI    Patient states that he has a suspected UTI, was placed on augmentin and started medication today 11/18. Urologist administered an additional antibiotic by injection yesterday 11/17. Patient states that he has upcoming procedure with  on 11/22, he would like to know what he should be doing. Please advise.    Thank you,  Adrian CAO    Callback Number: [phone] (home)

## 2022-11-20 LAB
BACTERIA UR CULT: NORMAL
SIGNIFICANT IND 70042: NORMAL
SITE SITE: NORMAL
SOURCE SOURCE: NORMAL

## 2022-11-21 ENCOUNTER — TELEPHONE (OUTPATIENT)
Dept: CARDIOLOGY | Facility: MEDICAL CENTER | Age: 72
End: 2022-11-21

## 2022-11-21 NOTE — TELEPHONE ENCOUNTER
MADDIE    Caller: - Anjum    Topic/issue: Anjum is having a procedure tomorrow, he does have some questions (un-disclosed)    Callback Number: [phone]    Thank you,   Amy ARRINGTON

## 2022-11-21 NOTE — TELEPHONE ENCOUNTER
Pt has sacral and heal wound, pt supposed to get wound care Wednesday AM. Pt wondering if orders can be placed for wound care to see him during his overnight stay in the hospital tomorrow. Advised pt to f/u with [facility] tomorrow for order request.    Pt to also request an air mattress/waffle overlay when he gets to admission for skin protection.

## 2022-11-22 ENCOUNTER — ANESTHESIA EVENT (OUTPATIENT)
Dept: CARDIOLOGY | Facility: MEDICAL CENTER | Age: 72
DRG: 273 | End: 2022-11-22
Payer: MEDICARE

## 2022-11-22 ENCOUNTER — APPOINTMENT (OUTPATIENT)
Dept: CARDIOLOGY | Facility: MEDICAL CENTER | Age: 72
DRG: 273 | End: 2022-11-22
Attending: INTERNAL MEDICINE
Payer: MEDICARE

## 2022-11-22 ENCOUNTER — HOSPITAL ENCOUNTER (INPATIENT)
Facility: MEDICAL CENTER | Age: 72
LOS: 1 days | DRG: 273 | End: 2022-11-23
Attending: INTERNAL MEDICINE | Admitting: INTERNAL MEDICINE
Payer: MEDICARE

## 2022-11-22 ENCOUNTER — ANESTHESIA (OUTPATIENT)
Dept: CARDIOLOGY | Facility: MEDICAL CENTER | Age: 72
DRG: 273 | End: 2022-11-22
Payer: MEDICARE

## 2022-11-22 DIAGNOSIS — I48.0 PAROXYSMAL ATRIAL FIBRILLATION (HCC): ICD-10-CM

## 2022-11-22 PROBLEM — I48.91 ATRIAL FIBRILLATION (HCC): Status: ACTIVE | Noted: 2022-11-22

## 2022-11-22 LAB
ACT BLD: 407 SEC (ref 74–137)
EKG IMPRESSION: NORMAL
ERYTHROCYTE [DISTWIDTH] IN BLOOD BY AUTOMATED COUNT: 50.2 FL (ref 35.9–50)
HCT VFR BLD AUTO: 40.6 % (ref 42–52)
HGB BLD-MCNC: 13.9 G/DL (ref 14–18)
LV EJECT FRACT  99904: 55
MCH RBC QN AUTO: 35.2 PG (ref 27–33)
MCHC RBC AUTO-ENTMCNC: 34.2 G/DL (ref 33.7–35.3)
MCV RBC AUTO: 102.8 FL (ref 81.4–97.8)
PLATELET # BLD AUTO: 138 K/UL (ref 164–446)
PMV BLD AUTO: 9.1 FL (ref 9–12.9)
RBC # BLD AUTO: 3.95 M/UL (ref 4.7–6.1)
WBC # BLD AUTO: 7.1 K/UL (ref 4.8–10.8)

## 2022-11-22 PROCEDURE — 700111 HCHG RX REV CODE 636 W/ 250 OVERRIDE (IP): Performed by: ANESTHESIOLOGY

## 2022-11-22 PROCEDURE — 160002 HCHG RECOVERY MINUTES (STAT)

## 2022-11-22 PROCEDURE — 85347 COAGULATION TIME ACTIVATED: CPT

## 2022-11-22 PROCEDURE — 700111 HCHG RX REV CODE 636 W/ 250 OVERRIDE (IP)

## 2022-11-22 PROCEDURE — 93005 ELECTROCARDIOGRAM TRACING: CPT | Performed by: INTERNAL MEDICINE

## 2022-11-22 PROCEDURE — 93325 DOPPLER ECHO COLOR FLOW MAPG: CPT | Mod: 26 | Performed by: ANESTHESIOLOGY

## 2022-11-22 PROCEDURE — 700117 HCHG RX CONTRAST REV CODE 255: Performed by: INTERNAL MEDICINE

## 2022-11-22 PROCEDURE — C1894 INTRO/SHEATH, NON-LASER: HCPCS

## 2022-11-22 PROCEDURE — 93355 ECHO TRANSESOPHAGEAL (TEE): CPT

## 2022-11-22 PROCEDURE — 99100 ANES PT EXTEME AGE<1 YR&>70: CPT | Performed by: ANESTHESIOLOGY

## 2022-11-22 PROCEDURE — 700105 HCHG RX REV CODE 258: Performed by: INTERNAL MEDICINE

## 2022-11-22 PROCEDURE — 85027 COMPLETE CBC AUTOMATED: CPT

## 2022-11-22 PROCEDURE — 36415 COLL VENOUS BLD VENIPUNCTURE: CPT

## 2022-11-22 PROCEDURE — 700101 HCHG RX REV CODE 250

## 2022-11-22 PROCEDURE — 160036 HCHG PACU - EA ADDL 30 MINS PHASE I

## 2022-11-22 PROCEDURE — 93010 ELECTROCARDIOGRAM REPORT: CPT | Mod: 59 | Performed by: INTERNAL MEDICINE

## 2022-11-22 PROCEDURE — 700101 HCHG RX REV CODE 250: Performed by: ANESTHESIOLOGY

## 2022-11-22 PROCEDURE — 160035 HCHG PACU - 1ST 60 MINS PHASE I

## 2022-11-22 PROCEDURE — 33340 PERQ CLSR TCAT L ATR APNDGE: CPT | Mod: Q0 | Performed by: INTERNAL MEDICINE

## 2022-11-22 PROCEDURE — A9270 NON-COVERED ITEM OR SERVICE: HCPCS | Performed by: INTERNAL MEDICINE

## 2022-11-22 PROCEDURE — 01926 ANES IVNTL RAD ICR ICAR/AORT: CPT | Performed by: ANESTHESIOLOGY

## 2022-11-22 PROCEDURE — 770020 HCHG ROOM/CARE - TELE (206)

## 2022-11-22 PROCEDURE — 02L73DK OCCLUSION OF LEFT ATRIAL APPENDAGE WITH INTRALUMINAL DEVICE, PERCUTANEOUS APPROACH: ICD-10-PCS | Performed by: INTERNAL MEDICINE

## 2022-11-22 PROCEDURE — 700102 HCHG RX REV CODE 250 W/ 637 OVERRIDE(OP): Performed by: INTERNAL MEDICINE

## 2022-11-22 PROCEDURE — 93312 ECHO TRANSESOPHAGEAL: CPT | Mod: 26,59 | Performed by: ANESTHESIOLOGY

## 2022-11-22 PROCEDURE — 93320 DOPPLER ECHO COMPLETE: CPT | Mod: 26 | Performed by: ANESTHESIOLOGY

## 2022-11-22 RX ORDER — HALOPERIDOL 5 MG/ML
1 INJECTION INTRAMUSCULAR
Status: DISCONTINUED | OUTPATIENT
Start: 2022-11-22 | End: 2022-11-22 | Stop reason: HOSPADM

## 2022-11-22 RX ORDER — LIDOCAINE HYDROCHLORIDE 40 MG/ML
SOLUTION TOPICAL PRN
Status: DISCONTINUED | OUTPATIENT
Start: 2022-11-22 | End: 2022-11-22 | Stop reason: SURG

## 2022-11-22 RX ORDER — ONDANSETRON 2 MG/ML
4 INJECTION INTRAMUSCULAR; INTRAVENOUS
Status: DISCONTINUED | OUTPATIENT
Start: 2022-11-22 | End: 2022-11-22 | Stop reason: HOSPADM

## 2022-11-22 RX ORDER — AMOXICILLIN AND CLAVULANATE POTASSIUM 500; 125 MG/1; MG/1
1 TABLET, FILM COATED ORAL 2 TIMES DAILY
Status: DISCONTINUED | OUTPATIENT
Start: 2022-11-23 | End: 2022-11-23 | Stop reason: HOSPADM

## 2022-11-22 RX ORDER — HYDROMORPHONE HYDROCHLORIDE 1 MG/ML
0.1 INJECTION, SOLUTION INTRAMUSCULAR; INTRAVENOUS; SUBCUTANEOUS
Status: DISCONTINUED | OUTPATIENT
Start: 2022-11-22 | End: 2022-11-22 | Stop reason: HOSPADM

## 2022-11-22 RX ORDER — LOSARTAN POTASSIUM 50 MG/1
50 TABLET ORAL
Status: DISCONTINUED | OUTPATIENT
Start: 2022-11-22 | End: 2022-11-23 | Stop reason: HOSPADM

## 2022-11-22 RX ORDER — DEXAMETHASONE SODIUM PHOSPHATE 4 MG/ML
INJECTION, SOLUTION INTRA-ARTICULAR; INTRALESIONAL; INTRAMUSCULAR; INTRAVENOUS; SOFT TISSUE PRN
Status: DISCONTINUED | OUTPATIENT
Start: 2022-11-22 | End: 2022-11-22 | Stop reason: SURG

## 2022-11-22 RX ORDER — CHOLECALCIFEROL (VITAMIN D3) 125 MCG
10 CAPSULE ORAL
Status: DISCONTINUED | OUTPATIENT
Start: 2022-11-22 | End: 2022-11-23 | Stop reason: HOSPADM

## 2022-11-22 RX ORDER — BACLOFEN 10 MG/1
20 TABLET ORAL 4 TIMES DAILY
Status: DISCONTINUED | OUTPATIENT
Start: 2022-11-22 | End: 2022-11-23 | Stop reason: HOSPADM

## 2022-11-22 RX ORDER — DIPHENHYDRAMINE HYDROCHLORIDE 50 MG/ML
12.5 INJECTION INTRAMUSCULAR; INTRAVENOUS
Status: DISCONTINUED | OUTPATIENT
Start: 2022-11-22 | End: 2022-11-22 | Stop reason: HOSPADM

## 2022-11-22 RX ORDER — DOCUSATE SODIUM 100 MG/1
200 CAPSULE, LIQUID FILLED ORAL 2 TIMES DAILY
Status: DISCONTINUED | OUTPATIENT
Start: 2022-11-22 | End: 2022-11-23 | Stop reason: HOSPADM

## 2022-11-22 RX ORDER — SODIUM CHLORIDE, SODIUM LACTATE, POTASSIUM CHLORIDE, CALCIUM CHLORIDE 600; 310; 30; 20 MG/100ML; MG/100ML; MG/100ML; MG/100ML
INJECTION, SOLUTION INTRAVENOUS CONTINUOUS
Status: ACTIVE | OUTPATIENT
Start: 2022-11-22 | End: 2022-11-22

## 2022-11-22 RX ORDER — HEPARIN SODIUM 200 [USP'U]/100ML
INJECTION, SOLUTION INTRAVENOUS
Status: COMPLETED
Start: 2022-11-22 | End: 2022-11-22

## 2022-11-22 RX ORDER — ONDANSETRON 2 MG/ML
INJECTION INTRAMUSCULAR; INTRAVENOUS PRN
Status: DISCONTINUED | OUTPATIENT
Start: 2022-11-22 | End: 2022-11-22 | Stop reason: SURG

## 2022-11-22 RX ORDER — OXYCODONE HCL 5 MG/5 ML
5 SOLUTION, ORAL ORAL
Status: DISCONTINUED | OUTPATIENT
Start: 2022-11-22 | End: 2022-11-22 | Stop reason: HOSPADM

## 2022-11-22 RX ORDER — HYDROMORPHONE HYDROCHLORIDE 1 MG/ML
0.4 INJECTION, SOLUTION INTRAMUSCULAR; INTRAVENOUS; SUBCUTANEOUS
Status: DISCONTINUED | OUTPATIENT
Start: 2022-11-22 | End: 2022-11-22 | Stop reason: HOSPADM

## 2022-11-22 RX ORDER — BUPIVACAINE HYDROCHLORIDE 2.5 MG/ML
INJECTION, SOLUTION EPIDURAL; INFILTRATION; INTRACAUDAL
Status: COMPLETED
Start: 2022-11-22 | End: 2022-11-22

## 2022-11-22 RX ORDER — HYDROMORPHONE HYDROCHLORIDE 1 MG/ML
0.2 INJECTION, SOLUTION INTRAMUSCULAR; INTRAVENOUS; SUBCUTANEOUS
Status: DISCONTINUED | OUTPATIENT
Start: 2022-11-22 | End: 2022-11-22 | Stop reason: HOSPADM

## 2022-11-22 RX ORDER — PROTAMINE SULFATE 10 MG/ML
INJECTION, SOLUTION INTRAVENOUS
Status: COMPLETED
Start: 2022-11-22 | End: 2022-11-22

## 2022-11-22 RX ORDER — SODIUM CHLORIDE, SODIUM LACTATE, POTASSIUM CHLORIDE, CALCIUM CHLORIDE 600; 310; 30; 20 MG/100ML; MG/100ML; MG/100ML; MG/100ML
INJECTION, SOLUTION INTRAVENOUS CONTINUOUS
Status: DISCONTINUED | OUTPATIENT
Start: 2022-11-22 | End: 2022-11-22 | Stop reason: HOSPADM

## 2022-11-22 RX ORDER — AMLODIPINE BESYLATE 10 MG/1
10 TABLET ORAL EVERY MORNING
Status: DISCONTINUED | OUTPATIENT
Start: 2022-11-23 | End: 2022-11-23 | Stop reason: HOSPADM

## 2022-11-22 RX ORDER — LIDOCAINE HYDROCHLORIDE 40 MG/ML
SOLUTION TOPICAL
Status: COMPLETED
Start: 2022-11-22 | End: 2022-11-22

## 2022-11-22 RX ORDER — OXYCODONE HCL 5 MG/5 ML
10 SOLUTION, ORAL ORAL
Status: DISCONTINUED | OUTPATIENT
Start: 2022-11-22 | End: 2022-11-22 | Stop reason: HOSPADM

## 2022-11-22 RX ORDER — HEPARIN SODIUM 1000 [USP'U]/ML
INJECTION, SOLUTION INTRAVENOUS; SUBCUTANEOUS
Status: COMPLETED
Start: 2022-11-22 | End: 2022-11-22

## 2022-11-22 RX ORDER — LIDOCAINE HYDROCHLORIDE 20 MG/ML
INJECTION, SOLUTION INFILTRATION; PERINEURAL
Status: COMPLETED
Start: 2022-11-22 | End: 2022-11-22

## 2022-11-22 RX ORDER — CEFAZOLIN SODIUM 1 G/3ML
INJECTION, POWDER, FOR SOLUTION INTRAMUSCULAR; INTRAVENOUS PRN
Status: DISCONTINUED | OUTPATIENT
Start: 2022-11-22 | End: 2022-11-22 | Stop reason: SURG

## 2022-11-22 RX ADMIN — PROPOFOL 200 MG: 10 INJECTION, EMULSION INTRAVENOUS at 13:53

## 2022-11-22 RX ADMIN — SUGAMMADEX 200 MG: 100 INJECTION, SOLUTION INTRAVENOUS at 14:38

## 2022-11-22 RX ADMIN — DEXAMETHASONE SODIUM PHOSPHATE 4 MG: 4 INJECTION, SOLUTION INTRA-ARTICULAR; INTRALESIONAL; INTRAMUSCULAR; INTRAVENOUS; SOFT TISSUE at 14:10

## 2022-11-22 RX ADMIN — DOCUSATE SODIUM 200 MG: 100 CAPSULE, LIQUID FILLED ORAL at 18:46

## 2022-11-22 RX ADMIN — BUPIVACAINE HYDROCHLORIDE: 2.5 INJECTION, SOLUTION EPIDURAL; INFILTRATION; INTRACAUDAL; PERINEURAL at 14:11

## 2022-11-22 RX ADMIN — CEFAZOLIN 2 G: 330 INJECTION, POWDER, FOR SOLUTION INTRAMUSCULAR; INTRAVENOUS at 13:58

## 2022-11-22 RX ADMIN — IOHEXOL 32 ML: 350 INJECTION, SOLUTION INTRAVENOUS at 14:36

## 2022-11-22 RX ADMIN — HEPARIN SODIUM 13000 UNITS: 1000 INJECTION, SOLUTION INTRAVENOUS; SUBCUTANEOUS at 14:11

## 2022-11-22 RX ADMIN — PROTAMINE SULFATE 50 MG: 10 INJECTION, SOLUTION INTRAVENOUS at 14:36

## 2022-11-22 RX ADMIN — RIVAROXABAN 20 MG: 20 TABLET, FILM COATED ORAL at 18:47

## 2022-11-22 RX ADMIN — LIDOCAINE HYDROCHLORIDE 4 ML: 40 SOLUTION TOPICAL at 13:55

## 2022-11-22 RX ADMIN — BACLOFEN 20 MG: 10 TABLET ORAL at 18:46

## 2022-11-22 RX ADMIN — SODIUM CHLORIDE, POTASSIUM CHLORIDE, SODIUM LACTATE AND CALCIUM CHLORIDE: 600; 310; 30; 20 INJECTION, SOLUTION INTRAVENOUS at 13:47

## 2022-11-22 RX ADMIN — LIDOCAINE HYDROCHLORIDE: 20 INJECTION, SOLUTION INFILTRATION; PERINEURAL at 14:11

## 2022-11-22 RX ADMIN — ROCURONIUM BROMIDE 80 MG: 10 INJECTION, SOLUTION INTRAVENOUS at 13:53

## 2022-11-22 RX ADMIN — ASPIRIN 81 MG: 81 TABLET, COATED ORAL at 18:47

## 2022-11-22 RX ADMIN — Medication 10 MG: at 22:52

## 2022-11-22 RX ADMIN — LOSARTAN POTASSIUM 50 MG: 50 TABLET, FILM COATED ORAL at 22:52

## 2022-11-22 RX ADMIN — BACLOFEN 20 MG: 10 TABLET ORAL at 22:52

## 2022-11-22 RX ADMIN — HEPARIN SODIUM 4000 UNITS: 200 INJECTION, SOLUTION INTRAVENOUS at 14:10

## 2022-11-22 RX ADMIN — ONDANSETRON 4 MG: 2 INJECTION INTRAMUSCULAR; INTRAVENOUS at 14:10

## 2022-11-22 ASSESSMENT — CHA2DS2 SCORE
VASCULAR DISEASE: NO
DIABETES: NO
CHF OR LEFT VENTRICULAR DYSFUNCTION: NO
SEX: MALE
CHA2DS2 VASC SCORE: 2
AGE 65 TO 74: YES
PRIOR STROKE OR TIA OR THROMBOEMBOLISM: NO
AGE 75 OR GREATER: NO
HYPERTENSION: YES

## 2022-11-22 ASSESSMENT — PAIN SCALES - GENERAL: PAIN_LEVEL: 0

## 2022-11-22 ASSESSMENT — PAIN DESCRIPTION - PAIN TYPE
TYPE: SURGICAL PAIN
TYPE: ACUTE PAIN;SURGICAL PAIN
TYPE: SURGICAL PAIN
TYPE: ACUTE PAIN;SURGICAL PAIN
TYPE: SURGICAL PAIN

## 2022-11-22 ASSESSMENT — FIBROSIS 4 INDEX
FIB4 SCORE: 2.96
FIB4 SCORE: 2.54

## 2022-11-22 NOTE — ANESTHESIA PROCEDURE NOTES
Airway    Date/Time: 11/22/2022 1:54 PM  Performed by: Wellington Vergara M.D.  Authorized by: Wellington Vergara M.D.     Location:  OR  Urgency:  Elective  Difficult Airway: No    Indications for Airway Management:  Anesthesia      Spontaneous Ventilation: absent    Sedation Level:  Deep  Preoxygenated: Yes    Patient Position:  Sniffing  Mask Difficulty Assessment:  0 - not attempted  Final Airway Type:  Endotracheal airway  Final Endotracheal Airway:  ETT  Cuffed: Yes    Technique Used for Successful ETT Placement:  Direct laryngoscopy    Insertion Site:  Oral  Blade Type:  Glide  Laryngoscope Blade/Videolaryngoscope Blade Size:  3  ETT Size (mm):  7.0  Measured from:  Teeth  ETT to Teeth (cm):  23  Placement Verified by: auscultation and capnometry    Cormack-Lehane Classification:  Grade I - full view of glottis  Number of Attempts at Approach:  1

## 2022-11-22 NOTE — OP REPORT
"[facility] Electrophysiology/Structural Heart Procedure Note     Procedure(s) Performed:   1) Watchman TOMI closure    Indication(s):    Physician(s): Elías Merino M.D.     Resident/Assistant(s): None     Anesthesia: General endotracheal anesthetic with Dr. Vergara     Specimen(s) Removed: None     Estimated Blood Loss:  30cc     Complications:  None     Description of Procedure:   After informed written consent, the patient was brought to the cath lab in the fasting, non-sedated state. The patient was prepped and draped in the usual sterile fashion. Femoral venous access was obtained using the modified Seldinger technique. This was done under direct US guidance to visualize the needle insertion. Images were saved to the PACS system. In the right femoral vein, an 8 Fr sheath were inserted over 0.035” guidewire and exchanged for an 8.5 Fr Dewar trans-septal sheath. RICHARD was used to identify the atrial septum, and left atrial appendage.  A Dewar trans-septal needle was inserted to into the trans-septal sheath, and under ultrasound guidance a inferior/posterior trans-septal location was used to cross into the left atrium. Intravenous heparin was given to target an -300. A stiff exchange length 0.035\" wire was inserted into the left atrium/left pulmonary veins, over which we exchanged to the WATCHMAN delivery sheath. The WATCHMAN device was prepped and flushed. A pigtail catheter was advanced into the delivery sheath into the left atrium and then after counter clockwise torque maneuvered into the body of the left atrial appendage. Cine was taken to verify the location of the pigtail in the appendage and to assess for depth. The sheath was then advanced over the pigtail until sufficient depth was reached.  The pigtail was removed, and under wet to wet connection the WATCHMAN device was advanced through the delivery sheath until markers were aligned. The sheath was retracted slowly to deploy the device. After " the device was deployed we assessed again for leak with contrast injected through the sheath as well as a tug test. We verified good position, anchoring, size, and seal with no/minimal leak with RICHARD. After all criteria were met we detached the device from the delivery system. At the end of the procedure, heparin was reversed with protamine, the catheter and sheaths were removed, and hemostasis was achieved by manual compression along with a figure of 8 silk suture. Following recovery from anesthesia, the patient was transferred to the PPU in good condition.        Fluoroscopy time:  9.2 min    Contrast used: 32 cc     Device implanted:  Size  27mm  Serial # [id number]  Max appendage pre-measurement 21 mm  Max device measurement 15-24% compression     Impressions:    1. Successful TOMI closure      Recommendations:  1. Xarelto and ASA 81mg  2. Admit to monitored bedrest.  3. Echo and removal of figure of 8 suture in the AM

## 2022-11-22 NOTE — OR NURSING
Patient currently working with advanced  wound care. Has a wound on Left ankle and sacrum that will need to have dressing change done tomorrow 11/23/22 since he will  inpatient versus home and will not be able to receive care.

## 2022-11-22 NOTE — ANESTHESIA PREPROCEDURE EVALUATION
" Date/Time: 11/22/22 1430    Scheduled providers: Elías Merino M.D.; Wellington Vergara M.D.    Procedure: CL-LEFT ATRIAL APPENDAGE CLOSURE    Diagnosis:       Paroxysmal atrial fibrillation (HCC) [I48.0]      Paroxysmal atrial fibrillation [I48.0]    Indications: See Associated Dx    Location: [facility] IMAGING - CATH LAB - [facility]          Relevant Problems   CARDIAC   (positive) Essential hypertension   (positive) Hypertension   (positive) Paroxysmal atrial flutter (HCC)         (positive) Nephrolithiasis      Other   (positive) Osteomyelitis of left ankle (HCC)     BP (!) 150/78   Pulse (!) 51   Temp 36.4 °C (97.6 °F) (Temporal)   Resp 16   Ht 1.778 m (5' 10\")   Wt 101 kg (222 lb 3.6 oz)   SpO2 95%   BMI 31.89 kg/m²     Physical Exam    Airway   Mallampati: II  TM distance: >3 FB  Neck ROM: full       Cardiovascular - normal exam  Rhythm: regular  Rate: normal  (-) murmur     Dental - normal exam           Pulmonary - normal exam  Breath sounds clear to auscultation     Abdominal    Neurological - normal exam                 Anesthesia Plan    ASA 4       Plan - general       Airway plan will be ETT  RICHARD Planned        Induction: intravenous    Postoperative Plan: Postoperative administration of opioids is intended.    Pertinent diagnostic labs and testing reviewed    Informed Consent:    Anesthetic plan and risks discussed with patient.    Use of blood products discussed with: patient whom consented to blood products.         "

## 2022-11-22 NOTE — ANESTHESIA TIME REPORT
Anesthesia Start and Stop Event Times     Date Time Event    11/22/2022 1301 Ready for Procedure     1347 Anesthesia Start     1451 Anesthesia Stop        Responsible Staff  11/22/22    Name Role Begin End    Wellington Vergara M.D. Anesth [id number]        Overtime Reason:  no overtime (within assigned shift)    Comments:

## 2022-11-22 NOTE — ANESTHESIA POSTPROCEDURE EVALUATION
Patient: Osvaldo Pate    Procedure Summary     Date: 11/22/22 Room / Location: [facility] IMAGING - CATH LAB [facility]    Anesthesia Start: 1347 Anesthesia Stop: 1451    Procedure: CL-LEFT ATRIAL APPENDAGE CLOSURE Diagnosis:       Paroxysmal atrial fibrillation (HCC)      Paroxysmal atrial fibrillation      (See Associated Dx)    Scheduled Providers: Elías Merino M.D.; Wellington Vergara M.D. Responsible Provider: Wellington Vergara M.D.    Anesthesia Type: general ASA Status: 4          Final Anesthesia Type: general  Last vitals  BP   Blood Pressure : (!) 150/78    Temp   36.4 °C (97.6 °F)    Pulse   (!) 51   Resp   16    SpO2   95 %      Anesthesia Post Evaluation    Patient location during evaluation: PACU  Patient participation: complete - patient participated  Level of consciousness: awake and alert  Pain score: 0    Airway patency: patent  Anesthetic complications: no  Cardiovascular status: hemodynamically stable  Respiratory status: acceptable  Hydration status: euvolemic    PONV: none          No notable events documented.     Nurse Pain Score: 0 (NPRS)

## 2022-11-22 NOTE — ANESTHESIA PROCEDURE NOTES
RICHARD    Date/Time: 11/22/2022 1:57 PM  Performed by: Wellington Vergara M.D.  Authorized by: Wellington Vergara M.D.     Start Time:11/22/2022 1:57 PM  Preanesthetic Checklist: patient identified, IV checked, site marked, risks and benefits discussed, surgical consent, monitors and equipment checked, pre-op evaluation and timeout performed    Indication for RICHARD: diagnostic   Patient Location: OR  Intubated: Yes  Bite Block: Yes  Heart Visualized: Yes  Insertion: atraumatic    **See FULL RICHARD report in patient's chart via CV Synapse**

## 2022-11-23 ENCOUNTER — APPOINTMENT (OUTPATIENT)
Dept: CARDIOLOGY | Facility: MEDICAL CENTER | Age: 72
DRG: 273 | End: 2022-11-23
Attending: NURSE PRACTITIONER
Payer: MEDICARE

## 2022-11-23 VITALS
WEIGHT: 222 LBS | SYSTOLIC BLOOD PRESSURE: 97 MMHG | RESPIRATION RATE: 16 BRPM | TEMPERATURE: 97.9 F | HEIGHT: 70 IN | DIASTOLIC BLOOD PRESSURE: 53 MMHG | HEART RATE: 43 BPM | OXYGEN SATURATION: 95 % | BODY MASS INDEX: 31.78 KG/M2

## 2022-11-23 PROBLEM — Z95.818 PRESENCE OF WATCHMAN LEFT ATRIAL APPENDAGE CLOSURE DEVICE: Status: ACTIVE | Noted: 2022-11-23

## 2022-11-23 LAB — EKG IMPRESSION: NORMAL

## 2022-11-23 PROCEDURE — 97605 NEG PRS WND THER DME<=50SQCM: CPT

## 2022-11-23 PROCEDURE — 700102 HCHG RX REV CODE 250 W/ 637 OVERRIDE(OP): Performed by: INTERNAL MEDICINE

## 2022-11-23 PROCEDURE — 93005 ELECTROCARDIOGRAM TRACING: CPT | Performed by: INTERNAL MEDICINE

## 2022-11-23 PROCEDURE — 306591 TRAY SUTURE REMOVAL DISP: Performed by: INTERNAL MEDICINE

## 2022-11-23 PROCEDURE — A9270 NON-COVERED ITEM OR SERVICE: HCPCS | Performed by: INTERNAL MEDICINE

## 2022-11-23 PROCEDURE — 93308 TTE F-UP OR LMTD: CPT

## 2022-11-23 PROCEDURE — 93010 ELECTROCARDIOGRAM REPORT: CPT | Performed by: INTERNAL MEDICINE

## 2022-11-23 PROCEDURE — 99238 HOSP IP/OBS DSCHRG MGMT 30/<: CPT | Performed by: NURSE PRACTITIONER

## 2022-11-23 PROCEDURE — 93308 TTE F-UP OR LMTD: CPT | Mod: 26 | Performed by: INTERNAL MEDICINE

## 2022-11-23 RX ORDER — ASPIRIN 81 MG/1
81 TABLET ORAL DAILY
Qty: 30 TABLET | Refills: 6 | Status: SHIPPED | OUTPATIENT
Start: 2022-11-24

## 2022-11-23 RX ADMIN — DOCUSATE SODIUM 200 MG: 100 CAPSULE, LIQUID FILLED ORAL at 05:49

## 2022-11-23 RX ADMIN — ASPIRIN 81 MG: 81 TABLET, COATED ORAL at 05:49

## 2022-11-23 RX ADMIN — BACLOFEN 20 MG: 10 TABLET ORAL at 13:20

## 2022-11-23 RX ADMIN — AMOXICILLIN AND CLAVULANATE POTASSIUM 1 TABLET: 500; 125 TABLET, FILM COATED ORAL at 05:49

## 2022-11-23 RX ADMIN — BACLOFEN 20 MG: 10 TABLET ORAL at 07:55

## 2022-11-23 ASSESSMENT — PAIN DESCRIPTION - PAIN TYPE: TYPE: ACUTE PAIN;SURGICAL PAIN
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
## 2022-11-23 NOTE — WOUND TEAM
[facility]  Inpatient Services  Initial Wound and Skin Care Evaluation    Admission Date: 11/22/2022     Last order of IP CONSULT TO WOUND CARE was found on 11/22/2022 from Hospital Encounter on 10/27/2022     HPI, PMH, SH: Reviewed    Past Surgical History:   Procedure Laterality Date    IRRIGATION & DEBRIDEMENT GENERAL Left 4/26/2022    Procedure: IRRIGATION AND DEBRIDEMENT, WOUND - LEG;  Surgeon: Eric Raman M.D.;  Location: [facility];  Service: Orthopedics    WOUND CLOSURE NEURO Left 4/26/2022    Procedure: CLOSURE, WOUND;  Surgeon: Eric Raman M.D.;  Location: [facility];  Service: Orthopedics    ORTHOPEDIC OSTEOTOMY Left 4/26/2022    Procedure: OSTECTOMY;  Surgeon: Eric Raman M.D.;  Location: [facility];  Service: Orthopedics    INCISION AND DRAINAGE ORTHOPEDIC Left 1/27/2022    Procedure: INCISION AND DRAINAGE, WOUND, BY ORTHOPEDICS;  Surgeon: Erasmo Stewart M.D.;  Location: [facility];  Service: Orthopedics    BONE BIOPSY Left 1/27/2022    Procedure: BIOPSY, BONE;  Surgeon: Erasmo Stewart M.D.;  Location: [facility];  Service: Orthopedics    INCISION AND DRAINAGE ORTHOPEDIC  1/22/2022    Procedure: INCISION AND DRAINAGE, WOUND, BY ORTHOPEDICS;  Surgeon: Erasmo Stewart M.D.;  Location: [facility];  Service: Orthopedics    KY CYSTOSCOPY,INSERT URETERAL STENT Left 1/4/2022    Procedure: CYSTOSCOPY, WITH URETERAL STENT INSERTION;  Surgeon: Osvaldo Baltazar M.D.;  Location: [facility];  Service: Urology    [place] CYSTO/URETERO/PYELOSCOPY, DX Left 1/4/2022    Procedure: URETEROSCOPY;  Surgeon: Osvaldo Baltazar M.D.;  Location: [facility];  Service: Urology    LASERTRIPSY N/A 1/4/2022    Procedure: LITHOTRIPSY, USING LASER;  Surgeon: Osvaldo Baltazar M.D.;  Location: [facility];  Service: Urology    [place] CYSTOSCOPY,INSERT URETERAL STENT Left 12/16/2021    Procedure: CYSTOSCOPY, WITH  URETERAL STENT INSERTION;  Surgeon: Aly Bowen M.D.;  Location: [facility];  Service: Urology    [place] CYSTO/URETERO/PYELOSCOPY, DX Left 2021    Procedure: URETEROSCOPY;  Surgeon: Aly Bowen M.D.;  Location: [facility];  Service: Urology    LASERTRIPSY Left 2021    Procedure: LITHOTRIPSY, USING LASER;  Surgeon: Aly Bowen M.D.;  Location: [facility];  Service: Urology    IRRIGATION & DEBRIDEMENT GENERAL  2020    Procedure: IRRIGATION AND DEBRIDEMENT, WOUND SACRAL ULCER;  Surgeon: Matt Cummins M.D.;  Location: [facility];  Service: Plastics    ULCER DEBRIDEMENT N/A 2019    Procedure: debridement of Sacral grade 4 ulcer - W/BONE BIOPSY, 3 liter wash out. bilateral sliding gluteal myocutaneous flap advancement;  Surgeon: Amadeo Moon M.D.;  Location: [facility];  Service: Plastics    FLAP CLOSURE  2019    Procedure: CLOSURE, FLAP - MUSCLE;  Surgeon: Amadeo Moon M.D.;  Location: [facility];  Service: Plastics    COLOSTOMY N/A 2019    Procedure: CREATION, COLOSTOMY -  placement;  Surgeon: Elías Hannah M.D.;  Location: [facility];  Service: General    COLOSTOMY      COLOSTOMY TAKEDOWN      HERNIA REPAIR      PERCUTANEOUOSPINNING LOWER EXTREMITY       Social History     Tobacco Use    Smoking status: Former     Packs/day: 1.00     Years: 10.00     Pack years: 10.00     Types: Cigarettes     Start date: 1967     Quit date: 1977     Years since quittin.9    Smokeless tobacco: Never   Substance Use Topics    Alcohol use: Yes     Alcohol/week: 0.6 oz     Types: 1 Standard drinks or equivalent per week     Comment: nightly, last drink 22     No chief complaint on file.    Diagnosis: Paroxysmal atrial fibrillation [I48.0]  Paroxysmal atrial fibrillation (HCC) [I48.0]  Atrial fibrillation (HCC) [I48.91]    Unit where seen by Wound Team: T811/02     WOUND  CONSULT/FOLLOW UP RELATED TO:  Sacrum and BLE     WOUND HISTORY:  72 y.o. male admitted for scheduled Watchman TOMI closure. Patient has history of pressure injuries to sacrum, L posterior leg, and L heel, which are currently being treated outpatient at [facility]. Left medial leg wound occurred after bone fragments had to be removed from the area per patient.    WOUND ASSESSMENT/LDA  Wound 04/22/22 Pressure Injury Sacrum POA stage 4 (Active)   Wound Image    11/23/22 1345   Site Assessment Granulation tissue    Periwound Assessment Maceration    Margins Defined edges;Unattached edges    Drainage Amount Scant    Drainage Description Serosanguineous    Treatments Cleansed;Offloading;Site care    Wound Cleansing Approved Wound Cleanser    Periwound Protectant Skin Protectant Wipes to Periwound;Paste Ring    Dressing Cleansing/Solutions Not Applicable    Dressing Options Wound Vac    Dressing Changed Changed    Dressing Change/Treatment Frequency Monday, Wednesday, Friday, and As Needed    WOUND NURSE ONLY - Pressure Injury Stage 4    Wound Length (cm) 1 cm 11/23/22 1345   Wound Width (cm) 3.2 cm 11/23/22 1345   Wound Depth (cm) 3.2 cm 11/23/22 1345   Wound Surface Area (cm^2) 3.2 cm^2 11/23/22 1345   Wound Volume (cm^3) 10.24 cm^3    Wound Healing % -327    Wound Odor None    Exposed Structures Bone        Wound 07/29/22 Pressure Injury Leg Posterior;Lateral Left (Active)   Site Assessment Purple;Red;Fragile    Periwound Assessment Fragile    Margins Defined edges;Attached edges    Drainage Amount None    Treatments Offloading    Wound Cleansing Approved Wound Cleanser    Periwound Protectant Not Applicable    Dressing Cleansing/Solutions Not Applicable    Dressing Options Mepilex    Dressing Changed Changed    Dressing Change/Treatment Frequency Every 72 hrs, and As Needed    WOUND NURSE ONLY - Pressure Injury Stage DTPI    Wound Odor None    Exposed Structures KEN        Wound 10/21/22 Pressure  Injury Left Posterior Heel stage 3 (Active)   Wound Image   11/23/22 1346   Site Assessment Purple;Red;Fragile    Periwound Assessment Fragile;Blanchable erythema    Margins Attached edges;Defined edges    Drainage Amount None    Treatments Cleansed;Offloading    Wound Cleansing Approved Wound Cleanser    Periwound Protectant Not Applicable    Dressing Cleansing/Solutions Not Applicable    Dressing Options Mepilex    Dressing Changed Changed    Dressing Status Clean;Dry;Intact    Dressing Change/Treatment Frequency Every 72 hrs, and As Needed    WOUND NURSE ONLY - Pressure Injury Stage 3    Wound Odor None    Exposed Structures None        Wound 11/11/22 LLE Medial Anterior (Active)   Wound Image    11/23/22 1346   Site Assessment Granulation tissue;Fragile    Periwound Assessment Fragile    Margins Defined edges;Unattached edges    Drainage Amount Small    Drainage Description Sanguineous    Treatments Cleansed;Site care    Wound Cleansing Approved Wound Cleanser    Periwound Protectant Skin Protectant Wipes to Periwound    Dressing Cleansing/Solutions Not Applicable    Dressing Options Hydrofiber Silver;Mepilex    Dressing Changed Changed    Dressing Change/Treatment Frequency Every 48 hrs, and As Needed    Wound Length (cm) 2.5 cm 11/23/22 1346   Wound Width (cm) 3.5 cm 11/23/22 1346   Wound Depth (cm) 1.1 cm 11/23/22 1346   Wound Surface Area (cm^2) 8.75 cm^2 11/23/22 1346   Wound Volume (cm^3) 9.625 cm^3 11/23/22 1346   Wound Healing % -1332    Wound Odor None    Exposed Structures None        Vascular:    JUAN MANUEL:   No results found.    Lab Values:    Lab Results   Component Value Date/Time    WBC 7.1 11/22/2022 01:16 PM    RBC 3.95 (L) 11/22/2022 01:16 PM    HEMOGLOBIN 13.9 (L) 11/22/2022 01:16 PM    HEMATOCRIT 40.6 (L) 11/22/2022 01:16 PM    CREACTPROT 0.82 (H) 04/01/2022 02:26 PM    SEDRATEWES 23 (H) 04/01/2022 02:26 PM        Culture Results show:  No results found for this or any previous visit (from the past  720 hour(s)).    Pain Level/Medicated:  Does not have sensation at areas of wound       INTERVENTIONS BY WOUND TEAM:  Chart and images reviewed. Discussed with bedside RN. All areas of concern (based on picture review, LDA review and discussion with bedside RN) have been thoroughly assessed. Documentation of areas based on significant findings. This RN in to assess patient. Performed standard wound care which includes appropriate positioning, dressing removal and non-selective debridement. Pictures and measurements obtained weekly if/when required.  Preparation for Dressing removal: Dressings removed without difficulty  Non-selectively Debrided with:  Wound cleanser and gauze.  Sharp debridement: NA  Raeann wound: Cleansed with wound cleanser, Prepped with no sting skin protectant. 1 paste ring applied around sacral wound.  Primary Dressing:    Sacrum: One piece of black foam tucked into depth and bridge towards R hip. Secured with drape. Button and Trac pad applied. Suction resumed at 125mmHg with no leaks. Mepilex applied to offload tubing.   Medial LLE: Aquacel Ag secured with mepilex   Anterior LLE, posterior LLE, and L heel: Protected with mepilex    Interdisciplinary consultation: Patient, Bedside RN, Wound RN (Irish)    EVALUATION / RATIONALE FOR TREATMENT:  Most Recent Date:  11/23/22: POA stage four pressure injury to sacrum, NPWT continued and patient re-connected to home machine as he is planning to discharge today. POA healing stage 3 pressure injury to L heel and POA DTI to posterior LLE- mepilex applied to offload and protect both of these areas. Full thickness wound to medial LLE, Aquacel appied to wick and absorb drainage while also providing wound bed with antimicrobial properties.  No open wounds to RLE, healed wound with discoloration noted to anterior shin.     Goals: Steady decrease in wound area and depth weekly.    WOUND TEAM PLAN OF CARE ([X] for frequency of wound follow up,):   Nursing to  follow dressing orders written for wound care. Contact wound team if area fails to progress, deteriorates or with any questions/concerns if something comes up before next scheduled follow up (See below as to whether wound is following and frequency of wound follow up)  Dressing changes by wound team:                   Follow up 3 times weekly:                NPWT change 3 times weekly:   X Sacrum NPWT MWF  Follow up 1-2 times weekly:    X Weekly for LLE  Follow up Bi-Monthly:           Follow up Monthly (High Risk):                        Follow up as needed:   X Not following RLE  Other (explain):     NURSING PLAN OF CARE ORDERS (X):  Dressing changes: See Dressing Care orders: X  Skin care: See Skin Care orders:   RN Prevention Protocol: X  Rectal tube care: See Rectal Tube Care orders:   Other orders:    RSKIN:   CURRENTLY IN PLACE (X), APPLIED THIS VISIT (A), ORDERED (O):   Q shift Luis Enrique:  X  Q shift pressure point assessments:  X    Surface/Positioning   Pressure redistribution mattress        X    Low Airloss          ICU Low Airloss   Bariatric JERARDO     Waffle cushion        Waffle Overlay        X  Reposition q 2 hours    X  TAPs Turning system   X  Z Pankaj Pillow     Offloading/Redistribution   Sacral Mepilex (Silicone dressing)     Heel Mepilex (Silicone dressing)       A  Heel float boots (Prevalon boot)        X     Float Heels off Bed with Pillows           Respiratory NA - on RA  Silicone O2 tubing         Gray Foam Ear protectors     Cannula fixation Device (Tender )          High flow offloading Clip    Elastic head band offloading device      Anchorfast                                                         Trach with Optifoam split foam             Containment/Moisture Prevention     Rectal tube or BMS    Purwick/Condom Cath        Cazares Catheter  X  Barrier wipes           Barrier paste       Antifungal tx      Interdry        Mobilization Not assessed this visit      Up to chair         Ambulate      PT/OT      Nutrition       Dietician        Diabetes Education      PO  X   TF     TPN     NPO   # days     Anticipated discharge plans:   LTACH:        SNF/Rehab:                  Home Health Care:           Outpatient Wound Center:          X  Self/Family Care:      X  Other:                  Vac Discharge Needs:   Not Applicable Pt not on a wound vac:       Regular Vac while inpatient, alternative dressing at DC:        Regular Vac in use and continued at DC:        X    Reg. Vac w/ Skin Sub/Biologic in use. Will need to be changed 2x wkly:      Veraflo Vac while inpatient, ok to transition to Regular Vac on Discharge:           Veraflo Vac while inpatient, will need to remain on Veraflo Vac upon discharge:

## 2022-11-23 NOTE — CARE PLAN
The patient is Stable - Low risk of patient condition declining or worsening    Shift Goals  Clinical Goals: labs and vitals  Patient Goals: rest  Family Goals: na    Progress made toward(s) clinical / shift goals:    Problem: Knowledge Deficit - Standard  Goal: Patient and family/care givers will demonstrate understanding of plan of care, disease process/condition, diagnostic tests and medications  Outcome: Progressing  Note: Patient understands disease process and limitations after surgery.        Patient is not progressing towards the following goals:      Problem: Skin Integrity  Goal: Skin integrity is maintained or improved  Outcome: Not Progressing  Note: Patient refuses Q2 turns and 4 eye skin assessment.

## 2022-11-23 NOTE — PROGRESS NOTES
Assumed care of patient, received bedside report from Luanne MENESES. Patient is A&O X 4. Pain 0/10. Vital signs stable during day shift, on RA. On tele monitor, SB 48. POC discussed with patient. Patient verbalized understanding. All questions answered. Call light within reach and fall precautions in place. Bed alarm on, bed locked and in lowest position.

## 2022-11-23 NOTE — DISCHARGE INSTRUCTIONS
Discharge Instructions    Discharged to [facility] home by car with self. Discharged via wheelchair, hospital escort: Yes.  Special equipment needed: Not Applicable    Be sure to schedule a follow-up appointment with your primary care doctor or any specialists as instructed.     Discharge Plan:   Diet Plan: Discussed  Activity Level: Discussed  Confirmed Follow up Appointment: Appointment Scheduled  Confirmed Symptoms Management: Discussed  Medication Reconciliation Updated: Yes    I understand that a diet low in cholesterol, fat, and sodium is recommended for good health. Unless I have been given specific instructions below for another diet, I accept this instruction as my diet prescription.   Other diet: cardiac     Special Instructions:     -Is this patient being discharged with medication to prevent blood clots?  Yes, Aspirin   Aspirin, ASA oral tablets  What is this medicine?  ASPIRIN (AS pir in) is a pain reliever. It is used to treat mild pain and fever. This medicine is also used as directed by a doctor to prevent and to treat heart attacks, to prevent strokes and blood clots, and to treat arthritis or inflammation.  This medicine may be used for other purposes; ask your health care provider or pharmacist if you have questions.  COMMON BRAND NAME(S): Aspir-Low, Aspir-Kasia, Aspirtab, Haroon Advanced Aspirin, Haroon Aspirin, Haroon Aspirin Extra Strength, Haroon Aspirin Plus, Haroon Extra Strength, Haroon Extra Strength Plus, Haroon Genuine Aspirin, Haroon Womens Aspirin, Bufferin, Bufferin Extra Strength, Bufferin Low Dose  What should I tell my health care provider before I take this medicine?  They need to know if you have any of these conditions:  anemia  asthma  bleeding problems  child with chickenpox, the flu, or other viral infection  diabetes  gout  if you frequently drink alcohol containing drinks  kidney disease  liver disease  low level of vitamin K  lupus  smoke tobacco  stomach ulcers or other problems  an  unusual or allergic reaction to aspirin, tartrazine dye, other medicines, dyes, or preservatives  pregnant or trying to get pregnant  breast-feeding  How should I use this medicine?  Take this medicine by mouth with a glass of water. Follow the directions on the package or prescription label. You can take this medicine with or without food. If it upsets your stomach, take it with food. Do not take your medicine more often than directed.  Talk to your pediatrician regarding the use of this medicine in children. While this drug may be prescribed for children as young as 12 years of age for selected conditions, precautions do apply. Children and teenagers should not use this medicine to treat chicken pox or flu symptoms unless directed by a doctor.  Patients over 65 years old may have a stronger reaction and need a smaller dose.  Overdosage: If you think you have taken too much of this medicine contact a poison control center or emergency room at once.  NOTE: This medicine is only for you. Do not share this medicine with others.  What if I miss a dose?  If you are taking this medicine on a regular schedule and miss a dose, take it as soon as you can. If it is almost time for your next dose, take only that dose. Do not take double or extra doses.  What may interact with this medicine?  Do not take this medicine with any of the following medications:  cidofovir  ketorolac  probenecid  This medicine may also interact with the following medications:  alcohol  alendronate  bismuth subsalicylate  flavocoxid  herbal supplements like feverfew, garlic, sid, ginkgo biloba, horse chestnut  medicines for diabetes or glaucoma like acetazolamide, methazolamide  medicines for gout  medicines that treat or prevent blood clots like enoxaparin, heparin, ticlopidine, warfarin  other aspirin and aspirin-like medicines  NSAIDs, medicines for pain and inflammation, like ibuprofen or naproxen  pemetrexed  sulfinpyrazone  varicella live  vaccine  This list may not describe all possible interactions. Give your health care provider a list of all the medicines, herbs, non-prescription drugs, or dietary supplements you use. Also tell them if you smoke, drink alcohol, or use illegal drugs. Some items may interact with your medicine.  What should I watch for while using this medicine?  If you are treating yourself for pain, tell your doctor or health care professional if the pain lasts more than 10 days, if it gets worse, or if there is a new or different kind of pain. Tell your doctor if you see redness or swelling. Also, check with your doctor if you have a fever that lasts for more than 3 days. Only take this medicine to prevent heart attacks or blood clotting if prescribed by your doctor or health care professional.  Do not take aspirin or aspirin-like medicines with this medicine. Too much aspirin can be dangerous. Always read the labels carefully.  This medicine can irritate your stomach or cause bleeding problems. Do not smoke cigarettes or drink alcohol while taking this medicine. Do not lie down for 30 minutes after taking this medicine to prevent irritation to your throat.  If you are scheduled for any medical or dental procedure, tell your healthcare provider that you are taking this medicine. You may need to stop taking this medicine before the procedure.  This medicine may be used to treat migraines. If you take migraine medicines for 10 or more days a month, your migraines may get worse. Keep a diary of headache days and medicine use. Contact your healthcare professional if your migraine attacks occur more frequently.  What side effects may I notice from receiving this medicine?  Side effects that you should report to your doctor or health care professional as soon as possible:  allergic reactions like skin rash, itching or hives, swelling of the face, lips, or tongue  breathing problems  changes in hearing, ringing in the  ears  confusion  general ill feeling or flu-like symptoms  pain on swallowing  redness, blistering, peeling or loosening of the skin, including inside the mouth or nose  signs and symptoms of bleeding such as bloody or black, tarry stools; red or dark-brown urine; spitting up blood or brown material that looks like coffee grounds; red spots on the skin; unusual bruising or bleeding from the eye, gums, or nose  trouble passing urine or change in the amount of urine  unusually weak or tired  yellowing of the eyes or skin  Side effects that usually do not require medical attention (report to your doctor or health care professional if they continue or are bothersome):  diarrhea or constipation  headache  nausea, vomiting  stomach gas, heartburn  This list may not describe all possible side effects. Call your doctor for medical advice about side effects. You may report side effects to FDA at 0-180-FDA-5426.  Where should I keep my medicine?  Keep out of the reach of children.  Store at room temperature between 15 and 30 degrees C (59 and 86 degrees F). Protect from heat and moisture. Do not use this medicine if it has a strong vinegar smell. Throw away any unused medicine after the expiration date.  NOTE: This sheet is a summary. It may not cover all possible information. If you have questions about this medicine, talk to your doctor, pharmacist, or health care provider.  © 2020 Elsevier/Gold Standard (2018-01-30 10:42:13)    Is patient discharged on Warfarin / Coumadin?   No       [facility] INSTRUCTIONS  No lifting > 10 lbs x 1 week.  No baths or hot tubs x 1 week.  May shower on 11/24/22  and take off groin dressing and leave site uncovered.  Continue to monitor site daily for warmth, redness, discolored drainage    Please do not miss any doses of your blood thinner.  Please keep all follow up appointments scheduled for you.  You will be seen in one week for wound check and have a follow up with Dr. Bell's  office in approximately 4 weeks.        After discharge, if you experience severe chest pain, shortness of breath, neurological changes, high fever, severe dizziness, trouble with catheter site needs to be seen in the emergency dept.     Left Atrial Appendage Closure Device Implantation, Care After  This sheet gives you information about how to care for yourself after your procedure. Your health care provider may also give you more specific instructions. If you have problems or questions, contact your health care provider.  What can I expect after the procedure?  After the procedure, it is common to have:  Some pain, swelling, soreness, and bruising around the site where a long, thin tube (catheter) was inserted in your groin (catheter insertion site).  Tiredness.  Follow these instructions at home:  Catheter insertion area care  Follow instructions from your health care provider about how to take care of the catheter insertion site. Make sure you:  Wash your hands with soap and water before you change your bandage (dressing). If soap and water are not available, use hand .  Change your dressing as told by your health care provider.  Leave stitches (sutures), skin glue, or adhesive strips in place. These skin closures may need to stay in place for 2 weeks or longer. If adhesive strip edges start to loosen and curl up, you may trim the loose edges. Do not remove adhesive strips completely unless your health care provider tells you to do that. Skin glue and adhesive strips usually fall off on their own.  Check your catheter insertion area every day for signs of infection. Check for:  Redness, swelling, or pain.  Fluid or blood.  Warmth.  Pus or a bad smell.  A lump or bump that may develop.  Medicines  Take over-the-counter and prescription medicines only as told by your health care provider.  If you were prescribed antibiotic medicine, take it as told by your health care provider. Do not stop taking the  antibiotic even if you start to feel better.  You may need to take medicines to prevent blood clots.  Activity  Do not lift anything that is heavier than 10 lb (4.5 kg), or the limit that you are told, until your health care provider says that it is safe.  Avoid activity that requires a lot of effort, including exercise and sports, as told by your health care provider. Ask your health care provider what activities are safe for you.  Avoid sexual activity until your health care provider says that it is safe.  Do not drive for 24 hours if you were given a medicine to help you relax (sedative) during the procedure. Ask your health care provider when it is safe for you to drive.  Lifestyle  Limit alcohol intake to no more than 1 drink a day for nonpregnant women and 2 drinks a day for men. One drink equals 12 oz of beer, 5 oz of wine, or 1½ oz of hard liquor.  Do not use any products that contain nicotine or tobacco, such as cigarettes and e-cigarettes. If you need help quitting, ask your health care provider.  General instructions  Do not take baths, swim, shower, or use a hot tub until your health care provider approves.  Follow instructions from your health care provider about eating or drinking restrictions. You may need to follow a diet that is low in salt (sodium) and low in fat to prevent heart problems.  Keep all follow-up visits as told by your health care provider. This is important.  Contact a health care provider if:  You have severe pain that does not get better with medicine.  You have redness, swelling, or pain around your catheter insertion site.  You have fluid or blood coming from your catheter insertion area.  Your catheter insertion site feels warm to the touch.  You have pus or a bad smell coming from your catheter insertion area.  You have a fever.  You have nausea and vomiting.  You develop a lump or bump in your catheter insertion area.  Get help right away if:    The catheter insertion area is  "bleeding, and the bleeding does not stop when you apply and hold steady pressure on the area.  You have chest pain.  You have trouble breathing.  You have dizziness.  You faint.  You have any symptoms of a stroke. \"BE FAST\" is an easy way to remember the main warning signs of a stroke:  B - Balance. Signs are dizziness, sudden trouble walking, or loss of balance.  E - Eyes. Signs are trouble seeing or a sudden change in vision.  F - Face. Signs are sudden weakness or numbness of the face, or the face or eyelid drooping on one side.  A - Arms. Signs are weakness or numbness in an arm. This happens suddenly and usually on one side of the body.  S - Speech. Signs are sudden trouble speaking, slurred speech, or trouble understanding what people say.  T - Time. Time to call emergency services. Write down what time symptoms started.  You have other signs of a stroke, such as:  A sudden, severe headache with no known cause.  Nausea or vomiting.  Seizure.  These symptoms may represent a serious problem that is an emergency. Do not wait to see if the symptoms will go away. Get medical help right away. Call your local emergency services (911 in the [place] Do not drive yourself to the hospital.  Summary  It is common to have some pain and soreness after the procedure.  Check your catheter insertion area every day for signs of infection, such as redness, swelling, or pain.  Do not take baths, swim, shower, or use a hot tub until your health care provider approves.  This information is not intended to replace advice given to you by your health care provider. Make sure you discuss any questions you have with your health care provider.  Document Released: 04/20/2018 Document Revised: 02/10/2020 Document Reviewed: 04/20/2018  Elsevier Patient Education © 2020 [employer]      "

## 2022-11-23 NOTE — PROGRESS NOTES
4 Eyes Skin Assessment Completed by ZAIRA Stroud and ZAIRA Womack.    Head WDL  Ears WDL  Nose WDL  Mouth WDL  Neck WDL  Breast/Chest WDL  Shoulder Blades WDL  Spine Redness and Blanching  (R) Arm/Elbow/Hand WDL  (L) Arm/Elbow/Hand WDL  Abdomen Ostomy bag  Groin Redness, supra pubic cath, right femoral groin site covered with gauze and Tegaderm. Site clean, dry, soft, with a figure 8 suture.    Scrotum/Coccyx/Buttocks Redness, slow to jovita, right hip has wound vac to suction  (R) Leg Redness and Non-Blanching  (L) Leg lower wound   (R) Heel/Foot/Toe Redness, Blanching, and Boggy  (L) Heel/Foot/Toe Redness and Boggy          Devices In Places Tele Box, Blood Pressure Cuff, Pulse Ox, Cazares, and Oxy Mask      Interventions In Place Heel Mepilex, Sacral Mepilex, Heel Float Boots, Waffle Overlay, Pillows, Q2 Turns, and Pressure Redistribution Mattress    Possible Skin Injury Yes    Pictures Uploaded Into Epic Yes  Wound Consult Placed Yes  RN Wound Prevention Protocol Ordered Yes

## 2022-11-23 NOTE — PROGRESS NOTES
Monitor Summary    Rhythm: SB 41-49 with 1st degree AV block     Ectopy: R pac, R pvc    Intervals: 21/14/45

## 2022-11-23 NOTE — PROGRESS NOTES
Bedside report received by night RN. Pt A&Ox4. Complains of no pain at this time. POC discussed with pt. Pt verbalizes understanding. Call light and belongings within reach. Bed locked and in lowest position. Alarm and fall precautions in place.

## 2022-11-23 NOTE — PROGRESS NOTES
Pt. being discharged. Pt. educated on discharge instructions and new prescriptions. Pt verbalizes understanding. Follow up appointment made with cardiology. PIV removed, monitor checked in. Pt. Going home with self .

## 2022-11-23 NOTE — PROGRESS NOTES
Assumed care of pt, received bedside report from PACU RN. Pt A/Ox4, 1 L oxymask, denies pain, on post op vitals, bed rest till 2030. Fall and safety precautions in place. POC discussed with pt, pt verbalizes understanding. No further needs at this time.

## 2022-11-23 NOTE — OR NURSING
Patient recovered well in post-op. AAOx4. VSS, on 1L via oxymask. R groin clean, dry, soft. Declines pain/nausea. Daughter updated and discussed POC. Patient belongings retrieved and on [facility]. Report called to ZAIRA Stroud. Patient transported to monitor w/ RN and CNA. O2 tank full.

## 2022-11-26 ENCOUNTER — HOSPITAL ENCOUNTER (EMERGENCY)
Facility: MEDICAL CENTER | Age: 72
End: 2022-11-26
Attending: EMERGENCY MEDICINE
Payer: MEDICARE

## 2022-11-26 VITALS
RESPIRATION RATE: 16 BRPM | OXYGEN SATURATION: 95 % | WEIGHT: 238.1 LBS | DIASTOLIC BLOOD PRESSURE: 70 MMHG | TEMPERATURE: 99.1 F | HEIGHT: 70 IN | BODY MASS INDEX: 34.09 KG/M2 | SYSTOLIC BLOOD PRESSURE: 173 MMHG | HEART RATE: 52 BPM

## 2022-11-26 DIAGNOSIS — Z51.89 ENCOUNTER FOR WOUND RE-CHECK: ICD-10-CM

## 2022-11-26 PROCEDURE — 97605 NEG PRS WND THER DME<=50SQCM: CPT

## 2022-11-26 PROCEDURE — 99284 EMERGENCY DEPT VISIT MOD MDM: CPT

## 2022-11-26 ASSESSMENT — FIBROSIS 4 INDEX: FIB4 SCORE: 2.96

## 2022-11-26 NOTE — DISCHARGE PLANNING
note:  Call was transferred by  to this ER CM. Pt said he needs an order for home health. He was admitted/discharged but no resumption order for HH. Pt was with [facility]. Pt has a woundvac .     Chart review done and pt was inpatient at [facility]. Discussed with ER CM Srinivas so she will forward information to T8 .

## 2022-11-26 NOTE — DISCHARGE PLANNING
note:  Genie DE ANDA kind enough to assist.     Received a written resumption of care for [facility] from Yahaira DE ANDA (paper form from [facility]) and CM faxed this to [facility]. CM called Lorena from [facility] and she notified CM that pt has been instructed to go to ER for woundvac dressing change since they do not have an RN that can see him until Monday. She said that pt was notified on Friday to go to ER. They notified him that he cannot wait until Monday for a woundvac change.     CM notified pt. CM also notified Danuta, wound RN. CM notified MATHEUS LEBRON CM .

## 2022-11-26 NOTE — ED TRIAGE NOTES
Patient has a wound vac that was placed on the 22nd and discharge from the hospital to his sacrum, he is a quadriplegic and states the wound vac needs changing as well as a wound to his left ankle needs dressing change.

## 2022-11-26 NOTE — DISCHARGE PLANNING
note:  Received a second call from pt. He said that he received a message that the MD cannot write orders because he has already been discharged.     He said that his PCP is unable to send any homehealth referral order because its a long Holiday Weekend. Pt said he needs a nurse to check his wound as soon as possible.     CM sent a message to ADILSON Alfaro.

## 2022-11-27 NOTE — WOUND TEAM
[facility]  Inpatient Services  Initial Wound and Skin Care Evaluation    Admission Date: 11/26/2022     Last order of IP CONSULT TO WOUND CARE was found on 11/22/2022 from Hospital Encounter on 10/27/2022     HPI, PMH, SH: Reviewed    Past Surgical History:   Procedure Laterality Date    IRRIGATION & DEBRIDEMENT GENERAL Left 4/26/2022    Procedure: IRRIGATION AND DEBRIDEMENT, WOUND - LEG;  Surgeon: Eric Raman M.D.;  Location: [facility];  Service: Orthopedics    WOUND CLOSURE NEURO Left 4/26/2022    Procedure: CLOSURE, WOUND;  Surgeon: Eric Raman M.D.;  Location: [facility];  Service: Orthopedics    ORTHOPEDIC OSTEOTOMY Left 4/26/2022    Procedure: OSTECTOMY;  Surgeon: Eric Raman M.D.;  Location: [facility];  Service: Orthopedics    INCISION AND DRAINAGE ORTHOPEDIC Left 1/27/2022    Procedure: INCISION AND DRAINAGE, WOUND, BY ORTHOPEDICS;  Surgeon: Erasmo Stewart M.D.;  Location: [facility];  Service: Orthopedics    BONE BIOPSY Left 1/27/2022    Procedure: BIOPSY, BONE;  Surgeon: Erasmo Stewart M.D.;  Location: [facility];  Service: Orthopedics    INCISION AND DRAINAGE ORTHOPEDIC  1/22/2022    Procedure: INCISION AND DRAINAGE, WOUND, BY ORTHOPEDICS;  Surgeon: Erasmo Stewart M.D.;  Location: [facility];  Service: Orthopedics    IL CYSTOSCOPY,INSERT URETERAL STENT Left 1/4/2022    Procedure: CYSTOSCOPY, WITH URETERAL STENT INSERTION;  Surgeon: Osvaldo Baltazar M.D.;  Location: [facility];  Service: Urology    IL CYSTO/URETERO/PYELOSCOPY, DX Left 1/4/2022    Procedure: URETEROSCOPY;  Surgeon: Osvaldo Baltazar M.D.;  Location: [facility];  Service: Urology    LASERTRIPSY N/A 1/4/2022    Procedure: LITHOTRIPSY, USING LASER;  Surgeon: Osvaldo Baltazar M.D.;  Location: [facility];  Service: Urology    IL CYSTOSCOPY,INSERT URETERAL STENT Left 12/16/2021    Procedure: CYSTOSCOPY, WITH  URETERAL STENT INSERTION;  Surgeon: Aly Bowen M.D.;  Location: [facility];  Service: Urology    NH CYSTO/URETERO/PYELOSCOPY, DX Left 2021    Procedure: URETEROSCOPY;  Surgeon: Aly Bowen M.D.;  Location: [facility];  Service: Urology    LASERTRIPSY Left 2021    Procedure: LITHOTRIPSY, USING LASER;  Surgeon: Aly Bowen M.D.;  Location: [facility];  Service: Urology    IRRIGATION & DEBRIDEMENT GENERAL  2020    Procedure: IRRIGATION AND DEBRIDEMENT, WOUND SACRAL ULCER;  Surgeon: Matt Cummins M.D.;  Location: [facility];  Service: Plastics    ULCER DEBRIDEMENT N/A 2019    Procedure: debridement of Sacral grade 4 ulcer - W/BONE BIOPSY, 3 liter wash out. bilateral sliding gluteal myocutaneous flap advancement;  Surgeon: Amadeo Moon M.D.;  Location: [facility];  Service: Plastics    FLAP CLOSURE  2019    Procedure: CLOSURE, FLAP - MUSCLE;  Surgeon: Amaedo Moon M.D.;  Location: [facility];  Service: Plastics    COLOSTOMY N/A 2019    Procedure: CREATION, COLOSTOMY -  placement;  Surgeon: Elías Hannah M.D.;  Location: [facility];  Service: General    COLOSTOMY      COLOSTOMY TAKEDOWN      HERNIA REPAIR      PERCUTANEOUOSPINNING LOWER EXTREMITY       Social History     Tobacco Use    Smoking status: Former     Packs/day: 1.00     Years: 10.00     Pack years: 10.00     Types: Cigarettes     Start date: 1967     Quit date: 1977     Years since quittin.9    Smokeless tobacco: Never   Substance Use Topics    Alcohol use: Yes     Alcohol/week: 0.6 oz     Types: 1 Standard drinks or equivalent per week     Comment: nightly, last drink 22     Chief Complaint   Patient presents with    Wound Check     Diagnosis: No admission diagnoses are documented for this encounter.    Unit where seen by Wound Team: [facility]  [facility]     WOUND CONSULT/FOLLOW UP RELATED TO:   VAC change to sacrum      WOUND HISTORY:   The patient has a wound VAC on his sacral decubitus ulcer, bilateral lower extremities.  He last had been changed on Wednesday.  He normally has home health assisting him 3 times a week.  He was briefly hospitalized to have a watchman device procedure and as result of the hospitalization his home health was canceled and they did not come to change the wound VAC yesterday.  He did contact his primary care physician and they reenrolled him, however they will not be able to get back to his house until Monday (48 hours from now).  He was told that to get the wound VAC change he should come here to the emergency department.     WOUND ASSESSMENT/LDA                      Vascular:    JUAN MANUEL:   No results found.    Lab Values:    Lab Results   Component Value Date/Time    WBC 7.1 11/22/2022 01:16 PM    RBC 3.95 (L) 11/22/2022 01:16 PM    HEMOGLOBIN 13.9 (L) 11/22/2022 01:16 PM    HEMATOCRIT 40.6 (L) 11/22/2022 01:16 PM    CREACTPROT 0.82 (H) 04/01/2022 02:26 PM    SEDRATEWES 23 (H) 04/01/2022 02:26 PM        Culture Results show:  No results found for this or any previous visit (from the past 720 hour(s)).    Pain Level/Medicated:  No pain        INTERVENTIONS BY WOUND TEAM:  Chart and images reviewed. Discussed with bedside RN. All areas of concern (based on picture review, LDA review and discussion with bedside RN) have been thoroughly assessed. Documentation of areas based on significant findings. This RN in to assess patient. Performed standard wound care which includes appropriate positioning, dressing removal and non-selective debridement. Pictures and measurements obtained weekly if/when required.  Preparation for Dressing removal: Dressing soaked with wound cleanser  Non-selectively Debrided with:  wound cleanser and gauze.  Sharp debridement: NA  Raeann wound: Cleansed with wound cleanser, Prepped with no sting, paste ring and drape  Primary Dressing: reg vac foam spiraled,  one end tucked onto wound depth and bridged along right hip. Entire area secured with drape.   Secondary (Outer) Dressing: Button, trac pad, patient's home vac     LLE dressing changed as well, comprised of AqAg, mepilex, and tubigrip.     Interdisciplinary consultation: Patient, Bedside RN, Wound RNs Rah and Danuta     EVALUATION / RATIONALE FOR TREATMENT:  Most Recent Date:    1126/22: Wounds progressing well. Home health re-ordered by ED team. Next change on Monday.      Goals: Steady decrease in wound area and depth weekly.    WOUND TEAM PLAN OF CARE ([X] for frequency of wound follow up,):   Nursing to follow dressing orders written for wound care. Contact wound team if area fails to progress, deteriorates or with any questions/concerns if something comes up before next scheduled follow up (See below as to whether wound is following and frequency of wound follow up)  Dressing changes by wound team:                   Follow up 3 times weekly:                NPWT change 3 times weekly:   X by home health   Follow up 1-2 times weekly:      Follow up Bi-Monthly:           Follow up Monthly (High Risk):                        Follow up as needed:     Other (explain):

## 2022-11-27 NOTE — ED NOTES
Pt placed into gurney using brigid lift.  Pt states he is only here for sacral wound vac dressing to be replaced. Wound care called to update that pt is here in need of dressing care.  Chart up for ERP.

## 2022-11-27 NOTE — ED NOTES
Patient dc home to self care, discussed dc instructions, follow up care, verbalizes understanding, assisted back in electric chair via brigid lift. Patient escorted out to parking garage per request.
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
## 2022-11-28 ENCOUNTER — TELEPHONE (OUTPATIENT)
Dept: WOUND CARE | Facility: MEDICAL CENTER | Age: 72
End: 2022-11-28
Payer: MEDICARE

## 2022-11-28 ENCOUNTER — TELEPHONE (OUTPATIENT)
Dept: CARDIOLOGY | Facility: MEDICAL CENTER | Age: 72
End: 2022-11-28

## 2022-11-28 DIAGNOSIS — Z95.818 PRESENCE OF WATCHMAN LEFT ATRIAL APPENDAGE CLOSURE DEVICE: ICD-10-CM

## 2022-11-28 NOTE — TELEPHONE ENCOUNTER
Telephone call from Anjum that [facility] is coming tomorrow to do vac and dressing changes and discharge him from their services. They are stating because now he can drive he is to independent for their services. Anjum is using the vehicle to get to appointments as needed, thus saving some time up in his chair when he would be waiting for rides. Advised Anjum that I will let Dr. Hodges know of situation and will send information to [facility] to evaluate for possible referral.

## 2022-11-28 NOTE — TELEPHONE ENCOUNTER
Reviewed with pt has HH coming tomorrow, reviewed with him what site should look like as well as site care. HH RN to call with any site issues.   Pt would like to schedule RICHARD ASAP so he has enough time to find a ride. Advised would send message to alex however she is OOO this week and would not follow up until next week. Pt is ok with this.

## 2022-11-28 NOTE — TELEPHONE ENCOUNTER
Caller: Osvaldo Pate        Topic/issue: Patient was calling with questions about his appointment for tomorrow and was asking for a call back      Callback Number: [phone]      Thank you    -Ryan AN

## 2022-11-29 ENCOUNTER — TELEPHONE (OUTPATIENT)
Dept: WOUND CARE | Facility: MEDICAL CENTER | Age: 72
End: 2022-11-29
Payer: MEDICARE

## 2022-11-29 NOTE — TELEPHONE ENCOUNTER
Yes, we discussed that he will need one 45 days post Watchman.  I did tell him that [facility]Aex[facility]ria is planning on calling him to arrange RICHARD after she is back in the office on 12/6.  If he wants to hold date earlier than that then can see if Jitendraaustin can schedule.  However, needs to be 45 days after Watchman implant.     Thank you  Genie

## 2022-11-30 NOTE — TELEPHONE ENCOUNTER
[facility] will accept Anjum for continuity of care. Order faxed for home health placed by PCP Britni DE ANDA on 11/26/22 to [facility] at [phone].

## 2022-12-01 ENCOUNTER — TELEPHONE (OUTPATIENT)
Dept: WOUND CARE | Facility: MEDICAL CENTER | Age: 72
End: 2022-12-01
Payer: MEDICARE

## 2022-12-01 NOTE — TELEPHONE ENCOUNTER
Anjum will confirm with [facility] that discharge is complete from service. [facility] is planning to call him today to schedule for Monday and Wednesday visits. Advised Za at [facility] that Anjum would prefer early am visits.

## 2022-12-02 ENCOUNTER — OFFICE VISIT (OUTPATIENT)
Dept: WOUND CARE | Facility: MEDICAL CENTER | Age: 72
End: 2022-12-02
Attending: INTERNAL MEDICINE
Payer: MEDICARE

## 2022-12-02 ENCOUNTER — HOSPITAL ENCOUNTER (OUTPATIENT)
Facility: MEDICAL CENTER | Age: 72
End: 2022-12-02
Attending: INTERNAL MEDICINE | Admitting: INTERNAL MEDICINE
Payer: MEDICARE

## 2022-12-02 ENCOUNTER — TELEPHONE (OUTPATIENT)
Dept: CARDIOLOGY | Facility: MEDICAL CENTER | Age: 72
End: 2022-12-02
Payer: MEDICARE

## 2022-12-02 ENCOUNTER — TELEPHONE (OUTPATIENT)
Dept: CARDIOLOGY | Facility: MEDICAL CENTER | Age: 72
End: 2022-12-02

## 2022-12-02 VITALS
DIASTOLIC BLOOD PRESSURE: 99 MMHG | OXYGEN SATURATION: 94 % | SYSTOLIC BLOOD PRESSURE: 154 MMHG | HEART RATE: 57 BPM | RESPIRATION RATE: 17 BRPM | TEMPERATURE: 99 F

## 2022-12-02 DIAGNOSIS — L89.154 SACRAL DECUBITUS ULCER, STAGE IV (HCC): ICD-10-CM

## 2022-12-02 DIAGNOSIS — L89.623 PRESSURE INJURY OF LEFT HEEL, STAGE 3 (HCC): ICD-10-CM

## 2022-12-02 DIAGNOSIS — T14.8XXA DEEP TISSUE INJURY: ICD-10-CM

## 2022-12-02 DIAGNOSIS — L89.890 PRESSURE INJURY OF LEFT LEG, UNSTAGEABLE (HCC): ICD-10-CM

## 2022-12-02 DIAGNOSIS — T81.31XD POSTOPERATIVE WOUND DEHISCENCE, SUBSEQUENT ENCOUNTER: ICD-10-CM

## 2022-12-02 DIAGNOSIS — G82.53 QUADRIPLEGIA, C5-C7, COMPLETE (HCC): ICD-10-CM

## 2022-12-02 PROCEDURE — 97605 NEG PRS WND THER DME<=50SQCM: CPT

## 2022-12-02 PROCEDURE — 11043 DBRDMT MUSC&/FSCA 1ST 20/<: CPT | Performed by: NURSE PRACTITIONER

## 2022-12-02 PROCEDURE — 11043 DBRDMT MUSC&/FSCA 1ST 20/<: CPT

## 2022-12-02 NOTE — PROGRESS NOTES
Patient has changed home health agencies. Patient to establish with [facility] starting 12/5/22. Home health orders faxed to [facility] at fax #[phone].

## 2022-12-02 NOTE — PATIENT INSTRUCTIONS
-Keep dressings clean and dry. Change dressings once between wound clinic visits, and if they become over saturated, soiled or fall off.     -Avoid prolonged standing or sitting without elevating your legs.    -Remove your compression garments if you have severe pain, severe swelling, numbness, color change, or temperature change in your toes. If you need to remove your compression garments, do so by unrolling them. Do not cut the compression garments off, this is to prevent cutting yourself on accident.    -Should you experience any significant changes in your wound(s), such as signs of infection (increasing redness, swelling, localized heat, increased pain, fever > 101 F, chills) or have any questions regarding your home care instructions, please contact the wound center at [phone]. If after hours, contact your primary care physician or go to the hospital emergency room.     -If you are 5 or more minutes late for an appointment, we reserve the right to cancel and reschedule that appointment. Additionally, if you are habitually late or not showing (3 late cancellations and/or no shows), we reserve the right to cancel your remaining appointments and it will be your responsibility to obtain a new referral if services are still needed.

## 2022-12-02 NOTE — TELEPHONE ENCOUNTER
Patient is scheduled on 1-13-23 for a Post watchman RICHARD w/anesthesia with Dr. Jarrell. Prisca meds to stop and patient to check in at 9:00 for an 11:00 procedure. H&P will be done on 1-12-23 by Genie Syed. Pre admit to call and do a telephone pre admit.

## 2022-12-02 NOTE — TELEPHONE ENCOUNTER
Phone Number Called: [phone]    Call outcome: Left detailed message for patient. Informed to call back with any additional questions.    Message: Called to inform patient that order was faxed for ASA.

## 2022-12-02 NOTE — TELEPHONE ENCOUNTER
MADDIE        Caller: Osvaldo Pate      Topic/issue: Patient was calling regarding the latest discharge and a new medication that was added for the aspirin EC 81 MG EC tablet medication and was asking if the medical order for it could be faxed to the group home he lives at  Fax# [phone]      Callback Number: [phone]    Thank you  -Ryan AN

## 2022-12-02 NOTE — PROGRESS NOTES
Provider Encounter- Pressure Injury        HISTORY OF PRESENT ILLNESS  Wound History:    START OF CARE IN CLINIC: 5/3/2022    REFERRING PROVIDER: Sahara Mendez      WOUNDS- Pressure Injury, Sacrococcygeal, stage IV                                                             Surgical wound dehiscence, left medial lower leg-status post surgical I&D of stage IV pressure injury and           biologic application                    Pressure injury, DTI, left posterior lower leg-first observed in clinic 7/29/2022       Pressure injury stage II L heel - first noted 10/21     Right 2nd toe avulsion - first noted 10/21     Skin tag left posterior ear - first noted 10/21     HISTORY: Patient with history of incomplete quadriplegia referred to [facility] for treatment of a stage IV pressure injury.  He has a history of previous pressure injuries to this area, and underwent muscle flaps in 2019, and then again in 2020.  He was seen in the wound clinic in November 2021 for an ulcer proximal from his current ulcer, and pressure injuries to his left posterior lower leg and left heel.  At that time, it was discovered that the patient had retained VAC foam embedded in the wound bed of the sacral wound.  Attempts were made to get him back to his plastic surgeon, though unsuccessful.  In January he underwent surgical removal of VAC sponge along with excisional debridement of his sacral wound by Dr. Chaves.  After the surgery, his wound went on to heal without incident.   In early April 2022, his home health nurse noted a new sacral ulcer, below the previous ulcer which quickly tripled in size over the following weeks.  The ulcer to his left medial lower leg had also deteriorated, with bone visible at the base..  He was hospitalized from 4/22 until 4/27/2022 and underwent surgery with Dr. Raman on 4/26 for irrigation and debridement of multiple compartments of the left lower extremity, bone excision, and complex closure of chronic wound  using biologic skin substitute.   His sacrococcygeal wound was not surgically addressed during this admission.  He was discharged back to his group home, with home health, and referral to outpatient wound clinic for his sacral wound.  He was instructed to follow-up with his surgeon for his lower leg wound.       Postoperatively, the left medial lower leg incision dehisced.  He was seen by his surgeon at [facility] on 5/11.  The surgeon opted to leave remaining sutures in place, and refer him to the wound clinic for treatment of this wound.   Treatment of this wound was initiated in clinic on 5/12.  During this visit was also noted that his heel DTI had resolved, but that he had a new pressure injury to his left posterior lower extremity.     A new pressure injury was noted to patient's right upper buttock/lower back on 5/20/2022.  Wound was linear in shape, skin discolored but intact.     Abrasion noted to left anterior lower leg.  First observed in clinic on 7/22/2022.  Patient states he bumped his leg into his food tray.     Small DTI noted to patient's left lateral lower leg on 7/29/2022.  Skin intact but discolored.     Large area of deep tissue injury noted to patient's left exterior lower leg.  Patient denied any trauma to this area.  Skin intact.  Wound documented.    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  Never used smokeless tobacco    Patient's problem list, allergies, and current medications reviewed and updated in Epic    Interval History:  Interval History thinned 7/29/2022.  Please see previous notes for complete interval history.     7/29/2022 : Clinic visit with Kelly  ADILSON Sandy, HOLDEN, IMAN, CFCN.  Anjum states he continues to do well.  He was able to go to [employer] yesterday, spent today [place].  His left lower extremity wound has deteriorated, presents today with significant odor and deterioration of tissue.  VAC held from this wound today after debridement.  I also did not apply biologic today.   Sacral wound about the same in area, though some evidence of increasing granulation.  No evidence of infection.  Small abrasion to his left lower leg appears to be improving.  He has a new small DTI to his left lateral lower leg, skin intact but discolored.    8/5/2022 : Clinic visit with ADILSON Arthur, HOLDEN, ALEXN, CFCN.  Anjum continues to feel well.  His left lower wound has improved with VAC hold, will discontinue from this wound altogether.  I did not apply biologic to this wound today given its current improvement.  DTI to posterior left leg is a bit darker today, skin still intact.  Patient states he has been wearing his heel float boot at all times.  Sacral wound with increased granulation, slight decrease in depth, bone still exposed.    8/12/2022 : Clinic visit with ADILSON Arthur, HOLDEN, IMAN, CFTRACI.   Anjum states he is feeling well.  His left lower leg wound continues to improve without the use of VAC or biologic.  The deep tissue injury to his posterior leg is gradually improving, area decreasing.  The abrasion to his left shin is now open, was covered with scab last week.  Sacral wound improving slowly, increase granulation, slight decrease in area.  Home health had messaged that they were concerned for infection due to odor.  I did not appreciate any significant odor today.  We did add Puracyn gel to the wound bed prior to reapplying VAC.  Anjum continues to spend most of his day up in his wheelchair, though tries to reposition frequently, and is wearing heel float boots bilaterally at all times.    8/19/2022 : Clinic visit with ADILSON Arthur,  HOLDEN, IMAN, LILIYA.   Turk states he is in his usual state of health, feeling well overall.  All of his wounds are progressing, though very slowly.  He continues to spend most of the day up in his wheelchair.  He does know to shift his weight frequently, and has an appropriate pressure relief cushion in his chair.    He was seen by [facility] earlier this week, for complaints of leakage around his suprapubic catheter and fever.  He was prescribed Keflex, and his catheter was changed    8/26/2022: Clinic visit with ADILSON Flores.  Patient reports overall he is feeling well denies any fever or chills.  Patient reports that he is continuously wearing Prevalon boots to offload bilateral lower extremities.  The left medial lower extremity wound is quite boggy with a small amount of turbulent fluid which was expressed with minimal palpation to the periwound area.  There is no foul-smelling odor emanating from the wound bed there is no erythema or edema.    9/2/2022 : Clinic visit with ADILSON Arthur, HOLDEN, IMAN, LILIYA.   States he is feeling well overall, denies fevers, chills, nausea, vomiting, cough or shortness of breath.  Unfortunately, his wounds are not progressing significantly.  Leg wound presents with boggy tissue, and probes to bone.  Area and depth of sacral wound have not changed significantly, bone still exposed.  Previously has been seen by several different plastic surgeons, including Dr. Rodriguez, Dr. Nicolas,  Dr. Mott, Dr. Chaves, he was also referred to Dr. Browne at [facility].  None of these surgeons have agreed to see him thus far.    9/9/2022: Clinic visit with Steve Hodges MD. Patient reports doing ok. Denies any changes to health or symptoms of infection. Wounds are not progressing, leg wound can be probed to bone and tissue is boggy. Wound VAC to sacrum with bone still exposed. He reports previously being treated for OM for 6 weeks without resolution. Discussed possibly getting  imaging to eval for OM, however with chronic OM there is limited to no role for prolonged Abx therapy per IDSA guidelines.    9/16/2022: Clinic visit with Steve Hodges MD. Patient reports feeling his normal state of health, denies any fevers or chills. His medial left leg wound is boggy and I was able to express some tan fluid concerning for purulence. Will take wound culture and order abx as appropriate for the results. Will not start empirical Abx as no evidence of cellulitis and his history of multiple rounds of Abx in the past. Will order CT scan of lower extremity and sacrum to further evaluate areas. Sacrum still to bone, had previous history of some kind of retained material in wound bed, will obtain the CT scan to evaluate.    9/23/2022: Clinic visit with Steve Hodges MD. Patient reports doing ok, denies any fevers or chills. Patient is taking Augmentin, reports tolerating well without any side effects. Tissue quality leg wound remains poor, still with some purulence able to be expressed but less than last week. Patient has CT scan planned for 10/3 and had labs today to ensure adequate renal function. Sacrum measuring smaller, with larger undermining.    9/30/2022 : Clinic visit with ADILSON Arthur, HOLDEN, IMAN, LILIYA.   Anjum states that he is feeling well.  He is still taking Augmentin, reports no adverse effects.  Purulent drainage still noted from lower leg wound, with boggy, dusky tissue in the wound bed.  We will extend Augmentin and additional 2 weeks.   Opening of sacral wound has decreased since last assessment, increase granulation within the wound bed noted, however bone still exposed.  His CT is scheduled for Monday 10/3, of both sacrum and his lower leg.    10/7/2022 : Clinic visit with ADILSON Arthur, HOLDEN, IMAN, LILIYA.   Anjum states he is feeling well, offers no complaints.  CT results discussed with him in clinic today, as well as recommendations from interdisciplinary rounds  a few days ago.  He was agreeable to allowing for today I&D of his leg wound today.  Will place referral to Dr. Saini for consideration of plastic intervention.  Patient states that if he were to undergo a flap procedure, he would be willing to be admitted to a skilled nursing facility long enough to allow for healing.   By removed a small chip of bone from leg wound during I&D.  Tissue is dusky and friable.  Will refer patient back to Dr. Raman, orthopedics, for reevaluation.    10/14/2022: Clinic visit with Steve Hodges MD. Patient reports doing well, denies any fevers or chills. Patient reports that his home health nurse feels leg wound improving, appears unchanged and probes to bone. CT scan of leg without definitive evidence of OM, was referred back to orthopedics with Dr. Raman but does not have appointment yet. He was also referred to Dr. Saini, does not have appointment.    10/21/2022: Clinic visit with Steve Hodges MD. Patient reports doing well, denies any symptoms of infection. He presents with left posterior ear skin tag and asked if I could address in clinic as it causes him discomfort when wearing mask. No significant change to left lower extremity wound or sacral wound. He does report having appointment with Dr. Raman 11/14 and with Dr. Saini sometime in November. Patient was found to have near complete avulsion to right 2nd toenail, he does not recall any specific trauma but is insensate. He was also noted to have new stage II pressure injury left heel despite wearing prevalon boots. Counseled to continue offloading heel as much as possible, can place pillow under leg to completely offload heel.    11/4/2022: Clinic visit with Steve Hodges MD. Patient reports doing well, denies any fevers or chills and generally feels well. Patient had episode of Afib and was restarted on Xarelto by cardiology, reports plan for whatchman procedure at some point in future. When assessing left lower  extremity wound, he was noted to have brisk venous bleeding with removal of dressing. Was not debrided and pressure dressing applied with hemostasis achieved. Left heel has increased in depth to stage III pressure injury despite wearing prevalon boots 24 hours a day, his Meliplex was not in place today. We discussed further offloading with pillow under left leg to allow for heel to be frefloated off mattress. Sacrum largely unchanged.   Reports appointment with Dr. Saini next week and appointment with Dr. Raman on 11/14.    11/11/2022: Clinic visit with Steve Hodges MD. Patient reports feeling well, denies any fevers or chills. Wound to his left leg appear worsening, now two distinct wounds that probe to bone and communicate. He has appointment with Dr. Raman next week. Counseled him to possibly consider amputation, reports that he has been discussing with Dr. Raman.   Sacrum remains largely unchanged. He is being evaluated for possible closure by Dr. Saini. I discussed case with Dr. Saini and he will evaluate wound care pictures to determine if he is candidate for surgical intervention.    11/18/2022: Clinic visit with Steve Hodges MD. Patient reports doing well. He has scheduled RICHARD and probable watchman device placement by interventional cardiology on Tuesday next week, he expects to be admitted through Wednesday morning. I called and coordinated with inpatient wound care team to have wound VAC and dressings changed while admitted. Sacral wound remains stable, measures roughly the same. Has not had follow up with Dr. Saini.   Patient was seen by Dr. Raman, as there does not appear to be acute infection he recommends continuing wound care but will reevaluate patient in 6 weeks and if no improvement may proceed with surgical I&D. Tissue quality remains poor and friable. Wound probes to bone.     12/2/2022 : Clinic visit with ADILSON Arthur, FNP-BC, CWDINESHN, CFCN.   Anjum continues to feel well  overall.  He is now driving himself to appointments.  [facility], however [facility] will be seeing him, starting on Monday.  No significant changes to his wounds.  He has not heard anything from Dr. Saini about possible surgical intervention.     REVIEW OF SYSTEMS:   Unchanged from previous wound clinic visit on 11/18/2022, except otherwise noted above.    PHYSICAL EXAMINATION:   BP (!) 154/99 (BP Location: Right arm, Patient Position: Sitting) Comment: RN notified  Pulse (!) 57   Temp 37.2 °C (99 °F) (Temporal)   Resp 17   SpO2 94%   Physical Exam  Constitutional:       Appearance: He is obese.   Cardiovascular:      Rate and Rhythm: Bradycardia present.   Pulmonary:      Effort: Pulmonary effort is normal. No respiratory distress.      Breath sounds: No wheezing.   Skin:     Comments: Stage IV coccygeal pressure injury -no significant changes.  Depth and undermining persists, bone exposed.  Some new granulation tissue noted.  Moderate serosanguineous drainage, no odor    Full-thickness wound to left medial lower leg x2-wound area has not changed significantly.  Skin bridge to anterior wound In clinic today.  Both wounds with red, nongranular tissue to the wound beds, moderate serosanguineous drainage, no odor    Stage III pressure injury left posterior heel -remains stable  Left anterior lower leg wounds nearly resolved  Left posterior/lateral lower leg wounds-remains stable    See flow sheet for details   Neurological:      Mental Status: He is alert and oriented to person, place, and time.       WOUND ASSESSMENT  Wound 04/22/22 Pressure Injury Sacrum POA stage 4 (Active)   Wound Image   12/02/22 1527   Site Assessment Red;Yellow;Other (Comment) 12/02/22 1527   Periwound Assessment Scar tissue;Fragile 12/02/22 1527   Margins Unattached edges 12/02/22 1527   Drainage Amount Moderate 12/02/22 1527   Drainage Description Sanguineous 12/02/22 1527    Treatments Cleansed;Provider debridement;Site care 12/02/22 1527   Wound Cleansing Normal Saline Irrigation 12/02/22 1527   Periwound Protectant Skin Protectant Wipes to Periwound;Benzoin;Paste Ring;Drape 12/02/22 1527   Dressing Cleansing/Solutions Not Applicable 12/02/22 1527   Dressing Options Wound Vac;Mepilex 12/02/22 1527   Dressing Changed Changed 11/11/22 1300   Dressing Change/Treatment Frequency Monday, Wednesday, Friday, and As Needed 12/02/22 1527   WOUND NURSE ONLY - Pressure Injury Stage 4 12/02/22 1527   Wound Length (cm) 1.5 cm 12/02/22 1527   Wound Width (cm) 2 cm 12/02/22 1527   Wound Depth (cm) 1.4 cm 12/02/22 1527   Wound Surface Area (cm^2) 3 cm^2 12/02/22 1527   Wound Volume (cm^3) 4.2 cm^3 12/02/22 1527   Post-Procedure Length (cm) 1.5 cm 12/02/22 1527   Post-Procedure Width (cm) 2.1 cm 12/02/22 1527   Post-Procedure Depth (cm) 1.5 cm 12/02/22 1527   Post-Procedure Surface Area (cm^2) 3.15 cm^2 12/02/22 1527   Post-Procedure Volume (cm^3) 4.725 cm^3 12/02/22 1527   Wound Healing % -75 12/02/22 1527   Tunneling (cm) 0 cm 12/02/22 1527   Tunneling Clock Position of Wound 9 11/04/22 1300   Undermining (cm) 2 cm 12/02/22 1527   Undermining of Wound, 1st Location From 9 o'clock;To 3 o'clock 12/02/22 1527   Undermining (cm) - 2nd location 3 cm 07/15/22 1400   Undermining of Wound, 2nd Location From 9 o'clock;To 11 o'clock 07/15/22 1400   Wound Odor None 12/02/22 1527   Exposed Structures Bone 12/02/22 1527   Number of days: 224       Wound 07/22/22 Traumatic Leg Anterior Left (Active)   Wound Image   12/02/22 1527   Site Assessment Purple 12/02/22 1527   Periwound Assessment Dry;Intact 12/02/22 1527   Margins Attached edges 08/19/22 1400   Drainage Amount None 12/02/22 1527   Drainage Description Serosanguineous 08/19/22 1400   Treatments Cleansed;Site care 12/02/22 1527   Wound Cleansing Foam Cleanser/Washcloth 12/02/22 1527   Periwound Protectant Skin Protectant Wipes to Periwound 12/02/22 1527    Dressing Cleansing/Solutions Not Applicable 12/02/22 1527   Dressing Options Silicone Adhesive Foam;Tubigrip 12/02/22 1527   Dressing Changed Changed 11/11/22 1300   Dressing Change/Treatment Frequency Monday, Wednesday, Friday, and As Needed 11/11/22 1300   WOUND NURSE ONLY - Pressure Injury Stage DTPI 12/02/22 1527   Non-staged Wound Description Full thickness 08/12/22 1300   Wound Length (cm) 0.4 cm 08/19/22 1400   Wound Width (cm) 0.4 cm 08/19/22 1400   Wound Depth (cm) 0.1 cm 08/19/22 1400   Wound Surface Area (cm^2) 0.16 cm^2 08/19/22 1400   Wound Volume (cm^3) 0.016 cm^3 08/19/22 1400   Post-Procedure Length (cm) 0.9 cm 08/12/22 1300   Post-Procedure Width (cm) 0.5 cm 08/12/22 1300   Post-Procedure Depth (cm) 0.1 cm 08/12/22 1300   Post-Procedure Surface Area (cm^2) 0.45 cm^2 08/12/22 1300   Post-Procedure Volume (cm^3) 0.045 cm^3 08/12/22 1300   Tunneling (cm) 0 cm 12/02/22 1527   Undermining (cm) 0 cm 12/02/22 1527   Wound Odor None 12/02/22 1527   Exposed Structures None 12/02/22 1527   Number of days: 133       Wound 07/29/22 Pressure Injury Leg Posterior;Lateral Left (Active)   Wound Image   12/02/22 1527   Site Assessment Purple 12/02/22 1527   Periwound Assessment Dry;Intact 12/02/22 1527   Drainage Amount None 12/02/22 1527   Drainage Description Sanguineous 08/12/22 1300   Treatments Cleansed;Site care 12/02/22 1527   Wound Cleansing Foam Cleanser/Washcloth 12/02/22 1527   Periwound Protectant Skin Protectant Wipes to Periwound 12/02/22 1527   Dressing Cleansing/Solutions Not Applicable 12/02/22 1527   Dressing Options Mepilex;Tubigrip 12/02/22 1527   Dressing Changed Changed 11/11/22 1300   Dressing Change/Treatment Frequency Monday, Wednesday, Friday, and As Needed 11/18/22 1300   WOUND NURSE ONLY - Pressure Injury Stage DTPI 12/02/22 1527   Wound Length (cm) 0.1 cm 08/12/22 1300   Wound Width (cm) 0.1 cm 08/12/22 1300   Wound Depth (cm) 0.1 cm 08/12/22 1300   Wound Surface Area (cm^2) 0.01 cm^2  08/12/22 1300   Wound Volume (cm^3) 0.001 cm^3 08/12/22 1300   Tunneling (cm) 0 cm 12/02/22 1527   Undermining (cm) 0 cm 12/02/22 1527   Wound Odor None 12/02/22 1527   Exposed Structures None 12/02/22 1527   Number of days: 126       Wound 10/21/22 Pressure Injury Left Posterior Heel stage 3 (Active)   Wound Image   12/02/22 1527   Site Assessment Purple;Fragile 12/02/22 1527   Periwound Assessment Fragile;Scar tissue 12/02/22 1527   Margins Other (Comment) 12/02/22 1527   Drainage Amount None 12/02/22 1527   Drainage Description Serosanguineous 11/18/22 1300   Treatments Cleansed;Site care 12/02/22 1527   Wound Cleansing Normal Saline Irrigation 12/02/22 1527   Periwound Protectant Skin Protectant Wipes to Periwound 12/02/22 1527   Dressing Cleansing/Solutions Not Applicable 12/02/22 1527   Dressing Options Mepilex Heel;Tubigrip 12/02/22 1527   Dressing Changed Changed 11/11/22 1300   Dressing Status Clean;Dry;Intact 11/04/22 1300   Dressing Change/Treatment Frequency Monday, Wednesday, Friday, and As Needed 11/18/22 1300   WOUND NURSE ONLY - Pressure Injury Stage 3 12/02/22 1527   Non-staged Wound Description Not applicable 11/18/22 1300   Wound Length (cm) 2 cm 11/18/22 1300   Wound Width (cm) 2 cm 11/18/22 1300   Wound Depth (cm) 0.1 cm 11/18/22 1300   Wound Surface Area (cm^2) 4 cm^2 11/18/22 1300   Wound Volume (cm^3) 0.4 cm^3 11/18/22 1300   Post-Procedure Length (cm) 2 cm 11/11/22 1300   Post-Procedure Width (cm) 2.3 cm 11/11/22 1300   Post-Procedure Depth (cm) 0.2 cm 11/11/22 1300   Post-Procedure Surface Area (cm^2) 4.6 cm^2 11/11/22 1300   Post-Procedure Volume (cm^3) 0.92 cm^3 11/11/22 1300   Wound Healing % -2567 11/18/22 1300   Tunneling (cm) 0 cm 12/02/22 1527   Undermining (cm) 0 cm 12/02/22 1527   Wound Odor None 12/02/22 1527   Exposed Structures None 12/02/22 1527   Number of days: 42       Wound 11/11/22 LLE Medial (anterior and posterior merged) (Active)   Wound Image     12/02/22 1527   Site  Assessment Red;Boggy;Fragile 12/02/22 1527   Periwound Assessment Fragile;Scar tissue 12/02/22 1527   Margins Unattached edges 12/02/22 1527   Drainage Amount Large 12/02/22 1527   Drainage Description Sanguineous;Serosanguineous 12/02/22 1527   Treatments Cleansed;Provider debridement;Site care 12/02/22 1527   Wound Cleansing Normal Saline Irrigation 12/02/22 1527   Periwound Protectant Skin Protectant Wipes to Periwound;Barrier Paste;Skin Moisturizer 12/02/22 1527   Dressing Cleansing/Solutions Not Applicable 12/02/22 1527   Dressing Options Hydrofiber Silver Strip;Hydrofiber Silver;Mepilex;Tubigrip 12/02/22 1527   Dressing Changed New 11/11/22 1300   Dressing Change/Treatment Frequency Monday, Wednesday, Friday, and As Needed 11/18/22 1300   Non-staged Wound Description Full thickness 12/02/22 1527   Wound Length (cm) 2 cm 12/02/22 1527   Wound Width (cm) 1.9 cm 12/02/22 1527   Wound Depth (cm) 1.4 cm 12/02/22 1527   Wound Surface Area (cm^2) 3.8 cm^2 12/02/22 1527   Wound Volume (cm^3) 5.32 cm^3 12/02/22 1527   Post-Procedure Length (cm) 3.5 cm 12/02/22 1527   Post-Procedure Width (cm) 4 cm 12/02/22 1527   Post-Procedure Depth (cm) 1.5 cm 12/02/22 1527   Post-Procedure Surface Area (cm^2) 14 cm^2 12/02/22 1527   Post-Procedure Volume (cm^3) 21 cm^3 12/02/22 1527   Wound Healing % -692 12/02/22 1527   Tunneling (cm) 0 cm 12/02/22 1527   Tunneling Clock Position of Wound 9 11/11/22 1300   Undermining (cm) 0 cm 12/02/22 1527   Wound Odor None 12/02/22 1527   Exposed Structures Bone 12/02/22 1527   Number of days: 21     PROCEDURE: Excisional debridement of sacrococcygeal wound  -Declined lidocaine as he is insensate  -Curette used to debride wound bed.  Excisional debridement was performed to remove devitalized tissue until healthy, bleeding tissue was visualized.  Debridement was carried into the undermined areas of the sacral wound.  Total area debrided approximately 5.0 cm² (including into wound undermining).   Deepest level of debridement was to muscle / fascia.  -Bleeding controlled with manual pressure.    -Wound care completed by wound RN, refer to flowsheet  -Patient tolerated the procedure well, without c/o pain or discomfort.     PROCEDURE: Excisional debridement of left medial posterior and anterior wounds  -2 declined lidocaine as patient is insensate  -Forceps and scissors used to excise thin skin bridge between 2 wounds of anterior/medial lower leg  -Curette then used to debride wound beds of anterior and posterior wounds.  Excisional debridement was performed to remove devitalized tissue until healthy, bleeding tissue was visualized.   Entire surface of wounds, 14.0 cm2 debrided.  Tissue debrided into the muscle layer.    -Bleeding controlled with manual pressure.    -Wound care completed by wound RN, refer to flowsheet  -Patient tolerated the procedure well, without c/o pain or discomfort.      No debridement of left posterior heel, left anterior leg, or left posterior/lateral leg    BIOLOGIC LOG  5/20/2022-first application.  PRODUCT: EpiCord 2 x 3 cm . PRODUCT #[id number]; EXPIRES: 2026-12-01 5/27/2022- second application.  PRODUCT: EpiCordEX 2 x 3 cm . PRODUCT #[id number]; EXPIRES: 2026-09-01    6/3/2022- third application.  PRODUCT: EpiCordEX 2 x 3 cm . PRODUCT #[id number]; EXPIRES: 2026-10-01    6/10/2022- fourth application.  PRODUCT: EpiCordEX 2 x 3 cm . PRODUCT #[id number]; EXPIRES: 2026-09-01 6/17/2022- fifth application.  PRODUCT: EpiCordEX 2 x 3 cm . PRODUCT #[id number]; EXPIRES: 2027-02-01 6/24/2022- sixth application.  PRODUCT: EpiCordEX 2 x 3 cm . PRODUCT #[id number]; EXPIRES: 2027-02-01 07/01/22: Seventh application.  Product: Epicort Dx 2.0 x 3.0 cm reorder number [id number]; product number [id number]; expires 2027-02-01 7/22/2022- eighth application.  PRODUCT: EpiCord 2 x 3 cm, reorder #[id number]. PRODUCT #[id number];  EXPIRES: 2027-02-01      Pertinent Labs and Diagnostics:    Labs:     A1c: No results found for: HBA1C     [facility] results, 7/1/2022 (under media tab)    CRP 13    ESR 31      IMAGING:     10/3/2022-CT of pelvis with contrast  IMPRESSION:     1.  Posterior soft tissue sacral wound and 1.5 x 3.7 cm abscess.   2.  Progressive destruction of the distal sacrum and proximal coccyx. Sclerosis and irregularity of the distal sacrum consistent with osteomyelitis.   3.  Old wound within the soft tissues posterior to the distal left SI joint with possible fistulous communication.    10/3/2022-CT of tib-fib with and without contrast, with reconstruction  IMPRESSION:     1.  Old fractures of the left tibia and fibula with extensive callus formation and bony bridging as well as extensive dystrophic calcifications. Similar to previous.   2.  Distal medial soft tissue wounds with ill-defined superficial in the soft tissue thickening and fluid collections suggestive of phlegmon. No organized abscess identified.   3.  No definite osteomyelitis.   4.  Arthritic changes.        VASCULAR STUDIES: No results found.    LAST  WOUND CULTURE:   Lab Results   Component Value Date/Time    CULTRSULT Usual urogenital ade 10-50,000 cfu/mL 11/17/2022 03:30 PM          ASSESSMENT AND PLAN:     1. Sacral decubitus ulcer, stage IV ([facility])  Comments: Ulcer first noted in early April 2022 as small open area which quickly enlarged.  This ulcer is present distal from previous sacral ulcer which healed after surgery in January.  Patient has history of flap reconstruction x2 to this area.    12/2/2022: Wound remains largely unchanged.  Depth and tracts persist.  Some evidence of new granulation tissue.  CT shows progressive destruction of distal sacrum and proximal coccyx.  -Excisional debridement of wound in clinic today, medically necessary to promote wound healing  -Patient is to return to clinic weekly for assessment and debridement   -Home health to  continue to change the wound VAC dressing 2 times per week in between clinic visits.  -Thus far we have been unable to find a surgeon willing to see him.  We will try to follow-up with Dr. Saini  -Healing of this wound is further complicated by the patient's multiple comorbidities, exposed bone, and significant pressure from sitting in his wheelchair    Wound care: NPWT at -125 mmHg to accelerate granulation, change 3 times per week.    2. Postoperative wound dehiscence, subsequent encounter  Comments: On 4/26 patient underwent irrigation and debridement of multiple compartments of the left lower extremity states for pressure injury, with bone excision, and complex closure of chronic wound using biologic skin substitute.  During postop visit in surgeons office on 5/11, surgical site was noted to be dehisced.  Patient was referred to wound clinic for management.    12/2/2022: Wound area is not changed significantly.  Newer wound, anterior from the original wound was 2 wounds has now merged into 1 after removal of skin bridge  -Excisional debridement of skin bridge and wound bed in clinic today, medically necessary to promote wound healing  -Patient to return to clinic weekly for assessment and debridement as needed  -Home health to change dressing 1-2 times per week in between clinic visits   -Patient to follow-up with Dr. Raman in 4 to 6 weeks if no improvement  -Consider resuming biologic and/or VAC    Wound care: Silver Hydrofiber to manage exudate and bioburden, Mepilex, Tubigrip D to manage edema    3. Deep tissue injury  4. Pressure injury of left leg, unstageable ([facility])  Comments: DTI to posterior left lower leg first observed in clinic 7/29/2022.  Patient does not know how it started, had been wearing heel float boot consistently.    12/2/2022: Skin remains intact, ulcer is stable.    -Continue to monitor for any deterioration in tissue quality  -Patient is currently wearing Prevalon boots to help prevent  pressure injuries to bilateral lower extremities    Wound care: Mepilex, Tubigrip D    5. Pressure injury of left heel, stage 3 ([facility])  Comments: First noted in clinic on 10/21/2022, originally noted to be stage II    12/2/2022: Also remained stable, no changes from last assessment  -No need for debridement today  -He is wearing Prevalon boots in clinic today  -Monitor each clinic visit as this area is vulnerable to pressure injury     Wound Care: Heel Mepilex to reduce pressure and friction    6. Quadriplegia, C5-C7, complete ([facility])  Comments: Complicating factor.  Impaired mobility and sensation  -Patient is still spending 7-8 hours/day up in his wheelchair, though he does have appropriate offloading cushion, and knows to reposition frequently.  Wears heel float boots bilaterally at all times    My total time spent caring for the patient on the day of the encounter was 20 minutes.   This does not include time spent on separately billable procedures/tests.     Please note that this note may have been created using voice recognition software. I have worked with technical experts from [facility] to optimize the interface.  I have made every reasonable attempt to correct obvious errors, but there may be errors of grammar and possibly content that I did not discover before finalizing the note.

## 2022-12-07 ENCOUNTER — TELEPHONE (OUTPATIENT)
Dept: CARDIOLOGY | Facility: MEDICAL CENTER | Age: 72
End: 2022-12-07

## 2022-12-07 NOTE — TELEPHONE ENCOUNTER
MADDIE     Caller: Osvaldo Pate     Topic/issue: Patient would like to discuss his medication rivaroxaban (XARELTO) 20 MG Tab tablet [[id number] and aspirin EC 81 MG EC tablet [[id number]] The patient states his primary care provider is concerned that he is taking both of these medications and so the patient wishes to discuss further. Please advise.     Callback Number: [phone] (home)       Thank you,   Joelle CONNORS

## 2022-12-08 NOTE — TELEPHONE ENCOUNTER
To EA - pt ok to start ASA 81mg after RICHARD?     Pt concerned about taking baby ASA and PCP advised him not to take both at the same time.   Will take both if absolutely necessary.

## 2022-12-09 ENCOUNTER — OFFICE VISIT (OUTPATIENT)
Dept: WOUND CARE | Facility: MEDICAL CENTER | Age: 72
End: 2022-12-09
Attending: INTERNAL MEDICINE
Payer: MEDICARE

## 2022-12-09 VITALS
SYSTOLIC BLOOD PRESSURE: 132 MMHG | OXYGEN SATURATION: 94 % | RESPIRATION RATE: 18 BRPM | TEMPERATURE: 99.1 F | HEART RATE: 56 BPM | DIASTOLIC BLOOD PRESSURE: 71 MMHG

## 2022-12-09 DIAGNOSIS — L89.623 PRESSURE INJURY OF LEFT HEEL, STAGE 3 (HCC): ICD-10-CM

## 2022-12-09 DIAGNOSIS — L89.890 PRESSURE INJURY OF LEFT LEG, UNSTAGEABLE (HCC): ICD-10-CM

## 2022-12-09 DIAGNOSIS — G82.53 QUADRIPLEGIA, C5-C7, COMPLETE (HCC): ICD-10-CM

## 2022-12-09 DIAGNOSIS — L89.154 SACRAL DECUBITUS ULCER, STAGE IV (HCC): ICD-10-CM

## 2022-12-09 DIAGNOSIS — T81.31XD POSTOPERATIVE WOUND DEHISCENCE, SUBSEQUENT ENCOUNTER: ICD-10-CM

## 2022-12-09 DIAGNOSIS — T14.8XXA DEEP TISSUE INJURY: ICD-10-CM

## 2022-12-09 PROCEDURE — 11043 DBRDMT MUSC&/FSCA 1ST 20/<: CPT | Performed by: NURSE PRACTITIONER

## 2022-12-09 PROCEDURE — 99212 OFFICE O/P EST SF 10 MIN: CPT | Mod: 25 | Performed by: NURSE PRACTITIONER

## 2022-12-09 PROCEDURE — 11043 DBRDMT MUSC&/FSCA 1ST 20/<: CPT

## 2022-12-09 PROCEDURE — 99213 OFFICE O/P EST LOW 20 MIN: CPT | Mod: 25

## 2022-12-09 PROCEDURE — 11042 DBRDMT SUBQ TIS 1ST 20SQCM/<: CPT

## 2022-12-09 NOTE — WOUND TEAM
[facility]

## 2022-12-09 NOTE — PATIENT INSTRUCTIONS
-Keep your wound dressing clean, dry, and intact.    -Change your dressing if it becomes soiled, soaked, or falls off.    -Wound vac may not have any drainage in tube or cannister & it will still be working.   Change cannister if it does become full by pressing tab on side of machine to remove canister and snap on new one. Full canister can be thrown in the trash. If cannister fills with bright red blood - go to ER. Dressing will be changed every MWF at the wound clinic.  If you are having issues with your wound VAC, please consider patching leaks, changing the canister, or calling [phone] for troubleshooting. If the wound VAC has been off or un-operational for over 2 hours, call wound care center to inform them and remove all dressings including black foam and replace with normal saline damp gauze.     -Should you experience any significant changes in your wound(s), such as infection (redness, swelling, localized heat, increased pain, fever > 101 F, chills) or have any questions regarding your home care instructions, please contact the wound center at [phone]. If after hours, contact your primary care physician or go to the hospital emergency room.

## 2022-12-10 ENCOUNTER — HOSPITAL ENCOUNTER (EMERGENCY)
Facility: MEDICAL CENTER | Age: 72
End: 2022-12-10
Attending: EMERGENCY MEDICINE
Payer: MEDICARE

## 2022-12-10 VITALS
HEART RATE: 87 BPM | HEIGHT: 70 IN | RESPIRATION RATE: 18 BRPM | WEIGHT: 224.87 LBS | SYSTOLIC BLOOD PRESSURE: 163 MMHG | BODY MASS INDEX: 32.19 KG/M2 | TEMPERATURE: 98.1 F | OXYGEN SATURATION: 92 % | DIASTOLIC BLOOD PRESSURE: 75 MMHG

## 2022-12-10 DIAGNOSIS — T83.010A SUPRAPUBIC CATHETER DYSFUNCTION, INITIAL ENCOUNTER (HCC): ICD-10-CM

## 2022-12-10 PROCEDURE — 303105 HCHG CATHETER EXTRA

## 2022-12-10 PROCEDURE — 51702 INSERT TEMP BLADDER CATH: CPT

## 2022-12-10 PROCEDURE — 51705 CHANGE OF BLADDER TUBE: CPT

## 2022-12-10 PROCEDURE — 99284 EMERGENCY DEPT VISIT MOD MDM: CPT

## 2022-12-10 ASSESSMENT — FIBROSIS 4 INDEX: FIB4 SCORE: 2.96

## 2022-12-10 NOTE — ED TRIAGE NOTES
"Pt reports a hx of incomplete quadriplegia with a chronic Cazares catheter in place (his surgical, and medical history are complex).  He C/O a possible obstruction with inability to flush the device.  We have assisted him in the past with a similar occurrence.   Chief Complaint   Patient presents with    Urinary Catheter Problem     BP (!) 182/93   Pulse 73   Temp 36.1 °C (97 °F) (Temporal)   Resp 18   Ht 1.778 m (5' 10\")   Wt 102 kg (224 lb 13.9 oz)   SpO2 94%   BMI 32.27 kg/m²   Has this patient been vaccinated for COVID YES  If not, would they like to be vaccinated while in the ER if eligible?  N/A  Would the patient like to speak with the ERP about the possibility of receiving the COVID vaccine today before making a decision? N/A     "

## 2022-12-10 NOTE — ED NOTES
Pt provided with discharge paper work and follow up care instructions. Pt declines need for hutchins education. PT to use home wheelchair to leave ER.

## 2022-12-10 NOTE — DISCHARGE INSTRUCTIONS
Continue your daily flushing  Come back if it clots off again  Come back if you start developing fever chills nausea or vomiting

## 2022-12-10 NOTE — PROGRESS NOTES
Provider Encounter- Pressure Injury        HISTORY OF PRESENT ILLNESS  Wound History:    START OF CARE IN CLINIC: 5/3/2022    REFERRING PROVIDER: Sahara Mendez      WOUNDS- Pressure Injury, Sacrococcygeal, stage IV                                                             Surgical wound dehiscence, left medial lower leg-status post surgical I&D of stage IV pressure injury and           biologic application                    Pressure injury, DTI, left posterior lower leg-first observed in clinic 7/29/2022       Pressure injury stage II L heel - first noted 10/21     Right 2nd toe avulsion - first noted 10/21     Skin tag left posterior ear - first noted 10/21     HISTORY: Patient with history of incomplete quadriplegia referred to [facility] for treatment of a stage IV pressure injury.  He has a history of previous pressure injuries to this area, and underwent muscle flaps in 2019, and then again in 2020.  He was seen in the wound clinic in November 2021 for an ulcer proximal from his current ulcer, and pressure injuries to his left posterior lower leg and left heel.  At that time, it was discovered that the patient had retained VAC foam embedded in the wound bed of the sacral wound.  Attempts were made to get him back to his plastic surgeon, though unsuccessful.  In January he underwent surgical removal of VAC sponge along with excisional debridement of his sacral wound by Dr. Chaves.  After the surgery, his wound went on to heal without incident.   In early April 2022, his home health nurse noted a new sacral ulcer, below the previous ulcer which quickly tripled in size over the following weeks.  The ulcer to his left medial lower leg had also deteriorated, with bone visible at the base..  He was hospitalized from 4/22 until 4/27/2022 and underwent surgery with Dr. Raman on 4/26 for irrigation and debridement of multiple compartments of the left lower extremity, bone excision, and complex closure of chronic wound  using biologic skin substitute.   His sacrococcygeal wound was not surgically addressed during this admission.  He was discharged back to his group home, with home health, and referral to outpatient wound clinic for his sacral wound.  He was instructed to follow-up with his surgeon for his lower leg wound.       Postoperatively, the left medial lower leg incision dehisced.  He was seen by his surgeon at [facility] on 5/11.  The surgeon opted to leave remaining sutures in place, and refer him to the wound clinic for treatment of this wound.   Treatment of this wound was initiated in clinic on 5/12.  During this visit was also noted that his heel DTI had resolved, but that he had a new pressure injury to his left posterior lower extremity.     A new pressure injury was noted to patient's right upper buttock/lower back on 5/20/2022.  Wound was linear in shape, skin discolored but intact.     Abrasion noted to left anterior lower leg.  First observed in clinic on 7/22/2022.  Patient states he bumped his leg into his food tray.     Small DTI noted to patient's left lateral lower leg on 7/29/2022.  Skin intact but discolored.     Large area of deep tissue injury noted to patient's left exterior lower leg.  Patient denied any trauma to this area.  Skin intact.  Wound documented.    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  Never used smokeless tobacco    Patient's problem list, allergies, and current medications reviewed and updated in Epic    Interval History:  Interval History thinned 7/29/2022.  Please see previous notes for complete interval history.     7/29/2022 : Clinic visit with Kelly  ADILSON Sandy, HOLDEN, IMAN, CFCN.  Anjum states he continues to do well.  He was able to go to [facility] yesterday, spent today [place].  His left lower extremity wound has deteriorated, presents today with significant odor and deterioration of tissue.  VAC held from this wound today after debridement.  I also did not apply biologic today.   Sacral wound about the same in area, though some evidence of increasing granulation.  No evidence of infection.  Small abrasion to his left lower leg appears to be improving.  He has a new small DTI to his left lateral lower leg, skin intact but discolored.    8/5/2022 : Clinic visit with ADILSON Arthur, HOLDEN, ALEXN, CFCN.  Anjum continues to feel well.  His left lower wound has improved with VAC hold, will discontinue from this wound altogether.  I did not apply biologic to this wound today given its current improvement.  DTI to posterior left leg is a bit darker today, skin still intact.  Patient states he has been wearing his heel float boot at all times.  Sacral wound with increased granulation, slight decrease in depth, bone still exposed.    8/12/2022 : Clinic visit with ADILSON Arthur, HOLDEN, IMAN, CFTRACI.   Anjum states he is feeling well.  His left lower leg wound continues to improve without the use of VAC or biologic.  The deep tissue injury to his posterior leg is gradually improving, area decreasing.  The abrasion to his left shin is now open, was covered with scab last week.  Sacral wound improving slowly, increase granulation, slight decrease in area.  Home health had messaged that they were concerned for infection due to odor.  I did not appreciate any significant odor today.  We did add Puracyn gel to the wound bed prior to reapplying VAC.  Anjum continues to spend most of his day up in his wheelchair, though tries to reposition frequently, and is wearing heel float boots bilaterally at all times.    8/19/2022 : Clinic visit with ADILSON Arthur,  HOLDEN, IMAN, LILIYA.   Turk states he is in his usual state of health, feeling well overall.  All of his wounds are progressing, though very slowly.  He continues to spend most of the day up in his wheelchair.  He does know to shift his weight frequently, and has an appropriate pressure relief cushion in his chair.    He was seen by [facility] earlier this week, for complaints of leakage around his suprapubic catheter and fever.  He was prescribed Keflex, and his catheter was changed    8/26/2022: Clinic visit with ADILSON Flores.  Patient reports overall he is feeling well denies any fever or chills.  Patient reports that he is continuously wearing Prevalon boots to offload bilateral lower extremities.  The left medial lower extremity wound is quite boggy with a small amount of turbulent fluid which was expressed with minimal palpation to the periwound area.  There is no foul-smelling odor emanating from the wound bed there is no erythema or edema.    9/2/2022 : Clinic visit with ADILSON Arthur, HOLDEN, IMAN, LILIYA.   States he is feeling well overall, denies fevers, chills, nausea, vomiting, cough or shortness of breath.  Unfortunately, his wounds are not progressing significantly.  Leg wound presents with boggy tissue, and probes to bone.  Area and depth of sacral wound have not changed significantly, bone still exposed.  Previously has been seen by several different plastic surgeons, including Dr. Rodriguez, Dr. Nicolas,  Dr. Mott, Dr. Chaves, he was also referred to Dr. Browne at [facility].  None of these surgeons have agreed to see him thus far.    9/9/2022: Clinic visit with Steve Hodges MD. Patient reports doing ok. Denies any changes to health or symptoms of infection. Wounds are not progressing, leg wound can be probed to bone and tissue is boggy. Wound VAC to sacrum with bone still exposed. He reports previously being treated for OM for 6 weeks without resolution. Discussed possibly getting  imaging to eval for OM, however with chronic OM there is limited to no role for prolonged Abx therapy per IDSA guidelines.    9/16/2022: Clinic visit with Steve Hodges MD. Patient reports feeling his normal state of health, denies any fevers or chills. His medial left leg wound is boggy and I was able to express some tan fluid concerning for purulence. Will take wound culture and order abx as appropriate for the results. Will not start empirical Abx as no evidence of cellulitis and his history of multiple rounds of Abx in the past. Will order CT scan of lower extremity and sacrum to further evaluate areas. Sacrum still to bone, had previous history of some kind of retained material in wound bed, will obtain the CT scan to evaluate.    9/23/2022: Clinic visit with Steve Hodges MD. Patient reports doing ok, denies any fevers or chills. Patient is taking Augmentin, reports tolerating well without any side effects. Tissue quality leg wound remains poor, still with some purulence able to be expressed but less than last week. Patient has CT scan planned for 10/3 and had labs today to ensure adequate renal function. Sacrum measuring smaller, with larger undermining.    9/30/2022 : Clinic visit with ADILSON Arthur, HOLDEN, IMAN, LILIYA.   Anjum states that he is feeling well.  He is still taking Augmentin, reports no adverse effects.  Purulent drainage still noted from lower leg wound, with boggy, dusky tissue in the wound bed.  We will extend Augmentin and additional 2 weeks.   Opening of sacral wound has decreased since last assessment, increase granulation within the wound bed noted, however bone still exposed.  His CT is scheduled for Monday 10/3, of both sacrum and his lower leg.    10/7/2022 : Clinic visit with ADILSON Arthur, HOLDEN, IMAN, LILIYA.   Anjum states he is feeling well, offers no complaints.  CT results discussed with him in clinic today, as well as recommendations from interdisciplinary rounds  a few days ago.  He was agreeable to allowing for today I&D of his leg wound today.  Will place referral to Dr. Saini for consideration of plastic intervention.  Patient states that if he were to undergo a flap procedure, he would be willing to be admitted to a skilled nursing facility long enough to allow for healing.   By removed a small chip of bone from leg wound during I&D.  Tissue is dusky and friable.  Will refer patient back to Dr. Raman, orthopedics, for reevaluation.    10/14/2022: Clinic visit with Steve Hodges MD. Patient reports doing well, denies any fevers or chills. Patient reports that his home health nurse feels leg wound improving, appears unchanged and probes to bone. CT scan of leg without definitive evidence of OM, was referred back to orthopedics with Dr. Raman but does not have appointment yet. He was also referred to Dr. Saini, does not have appointment.    10/21/2022: Clinic visit with Steve Hodges MD. Patient reports doing well, denies any symptoms of infection. He presents with left posterior ear skin tag and asked if I could address in clinic as it causes him discomfort when wearing mask. No significant change to left lower extremity wound or sacral wound. He does report having appointment with Dr. Raman 11/14 and with Dr. Saini sometime in November. Patient was found to have near complete avulsion to right 2nd toenail, he does not recall any specific trauma but is insensate. He was also noted to have new stage II pressure injury left heel despite wearing prevalon boots. Counseled to continue offloading heel as much as possible, can place pillow under leg to completely offload heel.    11/4/2022: Clinic visit with Steve Hodges MD. Patient reports doing well, denies any fevers or chills and generally feels well. Patient had episode of Afib and was restarted on Xarelto by cardiology, reports plan for whatchman procedure at some point in future. When assessing left lower  extremity wound, he was noted to have brisk venous bleeding with removal of dressing. Was not debrided and pressure dressing applied with hemostasis achieved. Left heel has increased in depth to stage III pressure injury despite wearing prevalon boots 24 hours a day, his Meliplex was not in place today. We discussed further offloading with pillow under left leg to allow for heel to be frefloated off mattress. Sacrum largely unchanged.   Reports appointment with Dr. Saini next week and appointment with Dr. Raman on 11/14.    11/11/2022: Clinic visit with Steve Hodges MD. Patient reports feeling well, denies any fevers or chills. Wound to his left leg appear worsening, now two distinct wounds that probe to bone and communicate. He has appointment with Dr. Raman next week. Counseled him to possibly consider amputation, reports that he has been discussing with Dr. Raman.   Sacrum remains largely unchanged. He is being evaluated for possible closure by Dr. Saini. I discussed case with Dr. Saini and he will evaluate wound care pictures to determine if he is candidate for surgical intervention.    11/18/2022: Clinic visit with Steve Hodges MD. Patient reports doing well. He has scheduled RICHARD and probable watchman device placement by interventional cardiology on Tuesday next week, he expects to be admitted through Wednesday morning. I called and coordinated with inpatient wound care team to have wound VAC and dressings changed while admitted. Sacral wound remains stable, measures roughly the same. Has not had follow up with Dr. Saini.   Patient was seen by Dr. Raman, as there does not appear to be acute infection he recommends continuing wound care but will reevaluate patient in 6 weeks and if no improvement may proceed with surgical I&D. Tissue quality remains poor and friable. Wound probes to bone.     12/2/2022 : Clinic visit with ADILSON Arthur, FNP-BC, CWDINESHN, CFCN.   Anjum continues to feel well  overall.  He is now driving himself to appointments.  [facility], however [facility] will be seeing him, starting on Monday.  No significant changes to his wounds.  He has not heard anything from Dr. Saini about possible surgical intervention.    12/9/2022 : Clinic visit with Kelly Sandy, ADILSON, FNPEGGY-BC, ALEXN, CFTRACI.   Anjum states he is feeling very well today.  His lateral leg wound i has deteriorated, area has increased,, tissue quality poor.  Increased granulation tissue noted to sacral wound.  There appears to be new granulation over bone.   Time spent today discussing possible surgical intervention.  I did find out that direct admission to [facility] might be possible, where Dr. Saini is medical director.  Plan would be for patient to undergo surgical debridement, possible veraflo placement, and antibiotics.  Anjum is open to this idea, but states he would not consider until after the holidays.     REVIEW OF SYSTEMS:   Unchanged from previous wound clinic visit on 12/2/2022, except otherwise noted above.    PHYSICAL EXAMINATION:   /71   Pulse (!) 56   Temp 37.3 °C (99.1 °F) (Temporal)   Resp 18   SpO2 94%   Physical Exam  Constitutional:       Appearance: He is obese.   Cardiovascular:      Rate and Rhythm: Bradycardia present.   Pulmonary:      Effort: Pulmonary effort is normal. No respiratory distress.      Breath sounds: No wheezing.   Skin:     Comments: Stage IV coccygeal pressure injury -increased granulation tissue noted.  Area and depth of wound and tracts about the same.  Periwound remains intact without signs or symptoms of infection    Full-thickness wound to left medial lower leg ALT now 1 continuous wound-area has increased, tissue polyp quality poor.  Moderate serosanguineous drainage, no odor, no periwound erythema or induration.    Stage III pressure injury left posterior heel -remains stable  Left anterior lower leg wounds are stable,  nearly resolved  Left posterior/lateral lower leg wounds-remains stable    Refer to wound flowsheet and photos   Neurological:      Mental Status: He is alert and oriented to person, place, and time.       WOUND ASSESSMENT  Wound 04/22/22 Pressure Injury Sacrum POA stage 4 (Active)   Wound Image    12/09/22 1300   Site Assessment Red;Yellow;Other (Comment) 12/09/22 1300   Periwound Assessment Scar tissue;Fragile;Satellite lesions;Other (Comment) 12/09/22 1300   Margins Unattached edges 12/09/22 1300   Drainage Amount Moderate 12/09/22 1300   Drainage Description Serosanguineous 12/09/22 1300   Treatments Cleansed;Provider debridement;Silver nitrate 12/09/22 1300   Wound Cleansing Puracyn [place] 12/09/22 1300   Periwound Protectant Benzoin;Paste Ring 12/09/22 1300   Dressing Cleansing/Solutions Not Applicable 12/09/22 1300   Dressing Options Wound Vac;Other (Comments) 12/09/22 1300   Dressing Changed Changed 12/09/22 1300   Dressing Change/Treatment Frequency Monday, Wednesday, Friday, and As Needed 12/09/22 1300   WOUND NURSE ONLY - Pressure Injury Stage 4 12/09/22 1300   Wound Length (cm) 2 cm 12/09/22 1300   Wound Width (cm) 2.1 cm 12/09/22 1300   Wound Depth (cm) 1.4 cm 12/09/22 1300   Wound Surface Area (cm^2) 4.2 cm^2 12/09/22 1300   Wound Volume (cm^3) 5.88 cm^3 12/09/22 1300   Post-Procedure Length (cm) 2.1 cm 12/09/22 1300   Post-Procedure Width (cm) 2.1 cm 12/09/22 1300   Post-Procedure Depth (cm) 1.4 cm 12/09/22 1300   Post-Procedure Surface Area (cm^2) 4.41 cm^2 12/09/22 1300   Post-Procedure Volume (cm^3) 6.174 cm^3 12/09/22 1300   Wound Healing % -145 12/09/22 1300   Tunneling (cm) 0 cm 12/09/22 1300   Tunneling Clock Position of Wound 9 11/04/22 1300   Undermining (cm) 2.3 cm 12/09/22 1300   Undermining of Wound, 1st Location From 9 o'clock;To 3 o'clock 12/09/22 1300   Undermining (cm) - 2nd location 3 cm 07/15/22 1400   Undermining of Wound, 2nd Location From 9 o'clock;To 11 o'clock 07/15/22 1400    Wound Odor None 12/09/22 1300   Exposed Structures Bone 12/09/22 1300   Number of days: 231       Wound 07/22/22 Traumatic Leg Anterior Left (Active)   Wound Image   12/09/22 1300   Site Assessment Purple 12/09/22 1300   Periwound Assessment Dry;Intact 12/09/22 1300   Margins Attached edges 08/19/22 1400   Drainage Amount None 12/09/22 1300   Drainage Description Serosanguineous 08/19/22 1400   Treatments Cleansed 12/09/22 1300   Wound Cleansing Normal Saline Irrigation 12/09/22 1300   Periwound Protectant Skin Protectant Wipes to Periwound 12/09/22 1300   Dressing Cleansing/Solutions Not Applicable 12/09/22 1300   Dressing Options Silicone Adhesive Foam;Tubigrip;Other (Comments) 12/09/22 1300   Dressing Changed Changed 12/09/22 1300   Dressing Change/Treatment Frequency Monday, Wednesday, Friday, and As Needed 12/09/22 1300   WOUND NURSE ONLY - Pressure Injury Stage DTPI 12/09/22 1300   Non-staged Wound Description Full thickness 08/12/22 1300   Wound Length (cm) 0.4 cm 08/19/22 1400   Wound Width (cm) 0.4 cm 08/19/22 1400   Wound Depth (cm) 0.1 cm 08/19/22 1400   Wound Surface Area (cm^2) 0.16 cm^2 08/19/22 1400   Wound Volume (cm^3) 0.016 cm^3 08/19/22 1400   Post-Procedure Length (cm) 0.9 cm 08/12/22 1300   Post-Procedure Width (cm) 0.5 cm 08/12/22 1300   Post-Procedure Depth (cm) 0.1 cm 08/12/22 1300   Post-Procedure Surface Area (cm^2) 0.45 cm^2 08/12/22 1300   Post-Procedure Volume (cm^3) 0.045 cm^3 08/12/22 1300   Tunneling (cm) 0 cm 12/02/22 1527   Undermining (cm) 0 cm 12/02/22 1527   Wound Odor None 12/02/22 1527   Exposed Structures None 12/02/22 1527   Number of days: 140       Wound 07/29/22 Pressure Injury Leg Posterior;Lateral Left (Active)   Wound Image   12/09/22 1300   Site Assessment Purple 12/09/22 1300   Periwound Assessment Dry;Intact 12/09/22 1300   Drainage Amount None 12/09/22 1300   Drainage Description Sanguineous 08/12/22 1300   Treatments Cleansed 12/09/22 1300   Wound Cleansing  Normal Saline Irrigation 12/09/22 1300   Periwound Protectant Skin Protectant Wipes to Periwound 12/09/22 1300   Dressing Cleansing/Solutions Not Applicable 12/09/22 1300   Dressing Options Mepilex;Tubigrip;Other (Comments) 12/09/22 1300   Dressing Changed Changed 11/11/22 1300   Dressing Change/Treatment Frequency Monday, Wednesday, Friday, and As Needed 12/09/22 1300   WOUND NURSE ONLY - Pressure Injury Stage DTPI 12/09/22 1300   Wound Length (cm) 0.1 cm 08/12/22 1300   Wound Width (cm) 0.1 cm 08/12/22 1300   Wound Depth (cm) 0.1 cm 08/12/22 1300   Wound Surface Area (cm^2) 0.01 cm^2 08/12/22 1300   Wound Volume (cm^3) 0.001 cm^3 08/12/22 1300   Tunneling (cm) 0 cm 12/02/22 1527   Undermining (cm) 0 cm 12/02/22 1527   Wound Odor None 12/09/22 1300   Exposed Structures None 12/09/22 1300   Number of days: 133       Wound 10/21/22 Pressure Injury Left Posterior Heel stage 3 (Active)   Wound Image   12/09/22 1300   Site Assessment Fragile;Other (Comment) 12/09/22 1300   Periwound Assessment Fragile;Scar tissue 12/09/22 1300   Margins Other (Comment) 12/09/22 1300   Drainage Amount None 12/09/22 1300   Drainage Description Serosanguineous 11/18/22 1300   Treatments Cleansed 12/09/22 1300   Wound Cleansing Normal Saline Irrigation 12/09/22 1300   Periwound Protectant Skin Protectant Wipes to Periwound 12/09/22 1300   Dressing Cleansing/Solutions Not Applicable 12/09/22 1300   Dressing Options Mepilex Heel;Tubigrip;Other (Comments) 12/09/22 1300   Dressing Changed Changed 12/09/22 1300   Dressing Status Clean;Dry;Intact 11/04/22 1300   Dressing Change/Treatment Frequency Monday, Wednesday, Friday, and As Needed 12/09/22 1300   WOUND NURSE ONLY - Pressure Injury Stage 3 12/09/22 1300   Non-staged Wound Description Not applicable 11/18/22 1300   Wound Length (cm) 2 cm 11/18/22 1300   Wound Width (cm) 2 cm 11/18/22 1300   Wound Depth (cm) 0.1 cm 11/18/22 1300   Wound Surface Area (cm^2) 4 cm^2 11/18/22 1300   Wound Volume  (cm^3) 0.4 cm^3 11/18/22 1300   Post-Procedure Length (cm) 2 cm 11/11/22 1300   Post-Procedure Width (cm) 2.3 cm 11/11/22 1300   Post-Procedure Depth (cm) 0.2 cm 11/11/22 1300   Post-Procedure Surface Area (cm^2) 4.6 cm^2 11/11/22 1300   Post-Procedure Volume (cm^3) 0.92 cm^3 11/11/22 1300   Wound Healing % -2567 11/18/22 1300   Tunneling (cm) 0 cm 12/02/22 1527   Undermining (cm) 0 cm 12/02/22 1527   Wound Odor None 12/09/22 1300   Exposed Structures None 12/09/22 1300   Number of days: 49       Wound 11/11/22 LLE Medial (anterior and posterior merged) (Active)   Wound Image    12/09/22 1300   Site Assessment Red;Boggy;Fragile 12/09/22 1300   Periwound Assessment Fragile;Scar tissue;Red 12/09/22 1300   Margins Unattached edges 12/09/22 1300   Drainage Amount Moderate 12/09/22 1300   Drainage Description Serosanguineous 12/09/22 1300   Treatments Cleansed;Provider debridement;Silver nitrate 12/09/22 1300   Wound Cleansing Puracyn [place] 12/09/22 1300   Periwound Protectant Skin Protectant Wipes to Periwound;Barrier Paste 12/09/22 1300   Dressing Cleansing/Solutions Normal Saline;Other (Comments) 12/09/22 1300   Dressing Options Hydrofera Blue Ready;Mepilex;Tubigrip;Other (Comments) 12/09/22 1300   Dressing Changed New 12/09/22 1300   Dressing Change/Treatment Frequency Monday, Wednesday, Friday, and As Needed 12/09/22 1300   Non-staged Wound Description Full thickness 12/09/22 1300   Wound Length (cm) 2.4 cm 12/09/22 1300   Wound Width (cm) 4 cm 12/09/22 1300   Wound Depth (cm) 1.2 cm 12/09/22 1300   Wound Surface Area (cm^2) 9.6 cm^2 12/09/22 1300   Wound Volume (cm^3) 11.52 cm^3 12/09/22 1300   Post-Procedure Length (cm) 2.5 cm 12/09/22 1300   Post-Procedure Width (cm) 4.1 cm 12/09/22 1300   Post-Procedure Depth (cm) 1.2 cm 12/09/22 1300   Post-Procedure Surface Area (cm^2) 10.25 cm^2 12/09/22 1300   Post-Procedure Volume (cm^3) 12.3 cm^3 12/09/22 1300   Wound Healing % -1614 12/09/22 1300   Tunneling (cm) 0 cm  12/09/22 1300   Tunneling Clock Position of Wound 9 11/11/22 1300   Undermining (cm) 0 cm 12/09/22 1300   Wound Odor Mild 12/09/22 1300   Exposed Structures Bone 12/09/22 1300   Number of days: 28     PROCEDURE: Excisional debridement of sacrococcygeal wound  -Declined lidocaine as he is insensate  -Curette used to debride wound bed.  Excisional debridement was performed to remove devitalized tissue until healthy, bleeding tissue was visualized.  Debridement was carried into the undermined areas of the sacral wound.  Total area debrided approximately 8 cm²  (including into wound undermining).  Deepest level of debridement was to muscle / fascia.  -Bleeding controlled with manual pressure.    -Wound care completed by wound RN, refer to flowsheet  -Patient tolerated the procedure well, without c/o pain or discomfort.     PROCEDURE: Excisional debridement of left medial lower leg wound  - topical anesthesia not required, area is insensate  -Curette used to debride wound bed.  Excisional debridement was performed to remove devitalized tissue until healthy, bleeding tissue was visualized.   Entire surface of wounds, 10.25 cm² debrided.  Tissue debrided into the muscle layer.    -Bleeding controlled with manual pressure.    -Wound care completed by wound RN, refer to flowsheet  -Patient tolerated the procedure well, without c/o pain or discomfort.      No debridement of left posterior heel, left anterior leg, or left posterior/lateral leg    BIOLOGIC LOG  5/20/2022-first application.  PRODUCT: EpiCord 2 x 3 cm . PRODUCT #[id number]; EXPIRES: 2026-12-01 5/27/2022- second application.  PRODUCT: EpiCordEX 2 x 3 cm . PRODUCT #[id number]; EXPIRES: 2026-09-01    6/3/2022- third application.  PRODUCT: EpiCordEX 2 x 3 cm . PRODUCT #EX [id number]; EXPIRES: 2026-10-01    6/10/2022- fourth application.  PRODUCT: EpiCordEX 2 x 3 cm . PRODUCT #EX [id number]; EXPIRES: 2026-09-01 6/17/2022- fifth  application.  PRODUCT: EpiCordEX 2 x 3 cm . PRODUCT #EX [id number]; EXPIRES: 2027-02-01 6/24/2022- sixth application.  PRODUCT: EpiCordEX 2 x 3 cm . PRODUCT #[id number]; EXPIRES: 2027-02-01 07/01/22: Seventh application.  Product: Epicort Dx 2.0 x 3.0 cm reorder number [id number]; product number [id number]; expires 2027-02-01 7/22/2022- eighth application.  PRODUCT: EpiCord 2 x 3 cm, reorder #[id number]. PRODUCT #[id number]; EXPIRES: 2027-02-01      Pertinent Labs and Diagnostics:    Labs:     A1c: No results found for: HBA1C     [facility] results, 7/1/2022 (under media tab)    CRP 13    ESR 31      IMAGING:     10/3/2022-CT of pelvis with contrast  IMPRESSION:     1.  Posterior soft tissue sacral wound and 1.5 x 3.7 cm abscess.   2.  Progressive destruction of the distal sacrum and proximal coccyx. Sclerosis and irregularity of the distal sacrum consistent with osteomyelitis.   3.  Old wound within the soft tissues posterior to the distal left SI joint with possible fistulous communication.    10/3/2022-CT of tib-fib with and without contrast, with reconstruction  IMPRESSION:     1.  Old fractures of the left tibia and fibula with extensive callus formation and bony bridging as well as extensive dystrophic calcifications. Similar to previous.   2.  Distal medial soft tissue wounds with ill-defined superficial in the soft tissue thickening and fluid collections suggestive of phlegmon. No organized abscess identified.   3.  No definite osteomyelitis.   4.  Arthritic changes.        VASCULAR STUDIES: No results found.    LAST  WOUND CULTURE:   Lab Results   Component Value Date/Time    CULTRSULT Usual urogenital ade 10-50,000 cfu/mL 11/17/2022 03:30 PM          ASSESSMENT AND PLAN:     1. Sacral decubitus ulcer, stage IV ([facility])  Comments: Ulcer first noted in early April 2022 as small open area which quickly enlarged.  This ulcer is present distal from previous sacral ulcer which healed  after surgery in January.  Patient has history of flap reconstruction x2 to this area.  CT shows progressive destruction of distal sacrum and proximal coccyx.    12/9/2022: Wound area and depth largely unchanged.  Some increasing granulation.  It appears that there is some new granulation over bone  -Excisional debridement of wound in clinic today, medically necessary to promote wound healing  -Patient is to return to clinic weekly for assessment and debridement   -Home health to continue to change the wound VAC dressing 2 times per week in between clinic visits.  -It is possible that patient could be admitted to [facility] [place] and undergo surgical debridement with Dr. Saini, with possible placement of veraflo.  This was discussed with patient today he is willing to consider, but not until after the holidays    Wound care: NPWT at -125 mmHg to accelerate granulation, change 3 times per week.    2. Postoperative wound dehiscence, subsequent encounter  Comments: On 4/26 patient underwent irrigation and debridement of multiple compartments of the left lower extremity states for pressure injury, with bone excision, and complex closure of chronic wound using biologic skin substitute.  During postop visit in surgeons office on 5/11, surgical site was noted to be dehisced.  Patient was referred to wound clinic for management.    12/9/2022: Wound area has increased since last assessment.  Tissue quality remains poor.  -Excisional debridement of wound in clinic today, medically necessary to promote wound healing.  -Patient to return to clinic weekly for assessment and debridement as needed  -Home health to change dressing 1-2 times per week in between clinic visits   -Patient to follow-up with Dr. Raman in 4 to 6 weeks if no improvement  -Consider resuming biologic and/or VAC  -I suspect pressure may be an issue still.  Patient states he is wearing Prevalon boot at all times    Wound care: Silver Hydrofiber to manage exudate and  bioburden, Mepilex, Tubigrip D to manage edema    3. Deep tissue injury  4. Pressure injury of left leg, unstageable ([facility])  Comments: DTI to posterior left lower leg first observed in clinic 7/29/2022.  Patient does not know how it started, had been wearing heel float boot consistently.    12/9/2022: Maintenance stable.  No need for debridement today  -Continue to monitor for any deterioration in tissue quality  -Patient is currently wearing Prevalon boots to help prevent pressure injuries to bilateral lower extremities    Wound care: Mepilex, Tubigrip D    5. Pressure injury of left heel, stage 3 ([facility])  Comments: First noted in clinic on 10/21/2022, originally noted to be stage II    12/9/2022: Remains stable, skin intact.  -No need for debridement today  -He is wearing Prevalon boots in clinic today  -Monitor each clinic visit as this area is vulnerable to pressure injury     Wound Care: Heel Mepilex to reduce pressure and friction    6. Quadriplegia, C5-C7, complete ([facility])  Comments: Complicating factor.  Impaired mobility and sensation  -Patient is still spending 7-8 hours/day up in his wheelchair, though he does have appropriate offloading cushion, and knows to reposition frequently.  Wears heel float boots bilaterally at all times    My total time spent caring for the patient on the day of the encounter was 20 minutes.   This does not include time spent on separately billable procedures/tests.     Please note that this note may have been created using voice recognition software. I have worked with technical experts from [facility] to optimize the interface.  I have made every reasonable attempt to correct obvious errors, but there may be errors of grammar and possibly content that I did not discover before finalizing the note.

## 2022-12-10 NOTE — ED NOTES
Pt is a paraplegic, wheelchair bound, that has had a suprapubic catheter for approximately 4 years. He states that his BP was elevated this morning and he noticed the catheter wasn't draining. He tried to flush the 24 fr twice that didn't appear to help. He is here today to have the catheter changed. No abdominal pain. No abnormal drainage or blood. The pt otherwise has no complaints.

## 2022-12-12 NOTE — TELEPHONE ENCOUNTER
Is he having any evidence of bleeding on both?  ASA and Xarelto are per post watchman implant guidelines.  So we do recommend use of both unless he is having issues with bleeding or bleeding with his wound care needs?    Thank you  Genie

## 2022-12-14 ENCOUNTER — NON-PROVIDER VISIT (OUTPATIENT)
Dept: CARDIOLOGY | Facility: MEDICAL CENTER | Age: 72
End: 2022-12-14
Payer: MEDICARE

## 2022-12-14 PROCEDURE — 93298 REM INTERROG DEV EVAL SCRMS: CPT | Performed by: INTERNAL MEDICINE

## 2022-12-15 NOTE — CARDIAC REMOTE MONITOR - SCAN
Device transmission reviewed. Device demonstrated appropriate function.       Electronically Signed by: Lex Skinner M.D.    12/19/2022  1:46 PM

## 2022-12-16 ENCOUNTER — OFFICE VISIT (OUTPATIENT)
Dept: WOUND CARE | Facility: MEDICAL CENTER | Age: 72
End: 2022-12-16
Attending: INTERNAL MEDICINE
Payer: MEDICARE

## 2022-12-16 VITALS
RESPIRATION RATE: 17 BRPM | OXYGEN SATURATION: 97 % | SYSTOLIC BLOOD PRESSURE: 153 MMHG | HEART RATE: 55 BPM | DIASTOLIC BLOOD PRESSURE: 95 MMHG | TEMPERATURE: 97.6 F

## 2022-12-16 DIAGNOSIS — L89.154 SACRAL DECUBITUS ULCER, STAGE IV (HCC): ICD-10-CM

## 2022-12-16 DIAGNOSIS — L89.623 PRESSURE INJURY OF LEFT HEEL, STAGE 3 (HCC): ICD-10-CM

## 2022-12-16 DIAGNOSIS — L89.890 PRESSURE INJURY OF LEFT LEG, UNSTAGEABLE (HCC): ICD-10-CM

## 2022-12-16 DIAGNOSIS — T14.8XXA DEEP TISSUE INJURY: ICD-10-CM

## 2022-12-16 DIAGNOSIS — G82.53 QUADRIPLEGIA, C5-C7, COMPLETE (HCC): ICD-10-CM

## 2022-12-16 DIAGNOSIS — T81.31XD POSTOPERATIVE WOUND DEHISCENCE, SUBSEQUENT ENCOUNTER: Primary | ICD-10-CM

## 2022-12-16 PROCEDURE — 11042 DBRDMT SUBQ TIS 1ST 20SQCM/<: CPT

## 2022-12-16 PROCEDURE — 99213 OFFICE O/P EST LOW 20 MIN: CPT | Mod: 25 | Performed by: STUDENT IN AN ORGANIZED HEALTH CARE EDUCATION/TRAINING PROGRAM

## 2022-12-16 PROCEDURE — 99213 OFFICE O/P EST LOW 20 MIN: CPT | Mod: 25

## 2022-12-16 PROCEDURE — 11043 DBRDMT MUSC&/FSCA 1ST 20/<: CPT | Performed by: STUDENT IN AN ORGANIZED HEALTH CARE EDUCATION/TRAINING PROGRAM

## 2022-12-16 PROCEDURE — 11043 DBRDMT MUSC&/FSCA 1ST 20/<: CPT

## 2022-12-16 PROCEDURE — 97605 NEG PRS WND THER DME<=50SQCM: CPT

## 2022-12-16 NOTE — PROCEDURES
NPWT < 50 sq cm dressing changed this visit, 1 pieces of foam removed. 1 new piece of black foam placed to wound bed and used for tracpad. Pump set to 125 mmhg, continuous suction. No leaks detected. Refer to flow sheet for wound care details. Patient tolerated the procedure well.

## 2022-12-16 NOTE — PATIENT INSTRUCTIONS
-Keep your wound dressing clean, dry, and intact.    -Change your dressing if it becomes soiled, soaked, or falls off.    -Wound vac may not have any drainage in tube or cannister & it will still be working.   Change cannister if it does become full by pressing tab on side of machine to remove canister and snap on new one. Full canister can be thrown in the trash. If cannister fills with bright red blood - go to ER. Dressing will be changed every MWF at the wound clini.  If you are having issues with your wound VAC, please consider patching leaks, changing the canister, or calling [phone] for troubleshooting. If the wound VAC has been off or un-operational for over 2 hours, call wound care center to inform them and remove all dressings including black foam and replace with normal saline damp gauze.     -Should you experience any significant changes in your wound(s), such as infection (redness, swelling, localized heat, increased pain, fever > 101 F, chills) or have any questions regarding your home care instructions, please contact the wound center at [phone]. If after hours, contact your primary care physician or go to the hospital emergency room.

## 2022-12-17 NOTE — PROGRESS NOTES
Provider Encounter- Pressure Injury        HISTORY OF PRESENT ILLNESS  Wound History:    START OF CARE IN CLINIC: 5/3/2022    REFERRING PROVIDER: Sahara Mendez      WOUNDS- Pressure Injury, Sacrococcygeal, stage IV                                                             Surgical wound dehiscence, left medial lower leg-status post surgical I&D of stage IV pressure injury and           biologic application                    Pressure injury, DTI, left posterior lower leg-first observed in clinic 7/29/2022       Pressure injury stage II L heel - first noted 10/21     Right 2nd toe avulsion - first noted 10/21     Skin tag left posterior ear - first noted 10/21     HISTORY: Patient with history of incomplete quadriplegia referred to [facility] for treatment of a stage IV pressure injury.  He has a history of previous pressure injuries to this area, and underwent muscle flaps in 2019, and then again in 2020.  He was seen in the wound clinic in November 2021 for an ulcer proximal from his current ulcer, and pressure injuries to his left posterior lower leg and left heel.  At that time, it was discovered that the patient had retained VAC foam embedded in the wound bed of the sacral wound.  Attempts were made to get him back to his plastic surgeon, though unsuccessful.  In January he underwent surgical removal of VAC sponge along with excisional debridement of his sacral wound by Dr. Chaves.  After the surgery, his wound went on to heal without incident.   In early April 2022, his home health nurse noted a new sacral ulcer, below the previous ulcer which quickly tripled in size over the following weeks.  The ulcer to his left medial lower leg had also deteriorated, with bone visible at the base..  He was hospitalized from 4/22 until 4/27/2022 and underwent surgery with Dr. Raman on 4/26 for irrigation and debridement of multiple compartments of the left lower extremity, bone excision, and complex closure of chronic wound  using biologic skin substitute.   His sacrococcygeal wound was not surgically addressed during this admission.  He was discharged back to his group home, with home health, and referral to outpatient wound clinic for his sacral wound.  He was instructed to follow-up with his surgeon for his lower leg wound.       Postoperatively, the left medial lower leg incision dehisced.  He was seen by his surgeon at [facility] on 5/11.  The surgeon opted to leave remaining sutures in place, and refer him to the wound clinic for treatment of this wound.   Treatment of this wound was initiated in clinic on 5/12.  During this visit was also noted that his heel DTI had resolved, but that he had a new pressure injury to his left posterior lower extremity.     A new pressure injury was noted to patient's right upper buttock/lower back on 5/20/2022.  Wound was linear in shape, skin discolored but intact.     Abrasion noted to left anterior lower leg.  First observed in clinic on 7/22/2022.  Patient states he bumped his leg into his food tray.     Small DTI noted to patient's left lateral lower leg on 7/29/2022.  Skin intact but discolored.     Large area of deep tissue injury noted to patient's left exterior lower leg.  Patient denied any trauma to this area.  Skin intact.  Wound documented.    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  Never used smokeless tobacco    Patient's problem list, allergies, and current medications reviewed and updated in Epic    Interval History:  Interval History thinned 7/29/2022.  Please see previous notes for complete interval history.     7/29/2022 : Clinic visit with Kelly  ADILSON Sandy, HOLDEN, IMAN, CFCN.  Anjum states he continues to do well.  He was able to go to [employer] yesterday, spent today [place].  His left lower extremity wound has deteriorated, presents today with significant odor and deterioration of tissue.  VAC held from this wound today after debridement.  I also did not apply biologic today.   Sacral wound about the same in area, though some evidence of increasing granulation.  No evidence of infection.  Small abrasion to his left lower leg appears to be improving.  He has a new small DTI to his left lateral lower leg, skin intact but discolored.    8/5/2022 : Clinic visit with ADILSON Arthur, HOLDEN, ALEXN, CFCN.  Anjum continues to feel well.  His left lower wound has improved with VAC hold, will discontinue from this wound altogether.  I did not apply biologic to this wound today given its current improvement.  DTI to posterior left leg is a bit darker today, skin still intact.  Patient states he has been wearing his heel float boot at all times.  Sacral wound with increased granulation, slight decrease in depth, bone still exposed.    8/12/2022 : Clinic visit with ADILSON Arthur, HOLDEN, IMAN, CFTRACI.   Anjum states he is feeling well.  His left lower leg wound continues to improve without the use of VAC or biologic.  The deep tissue injury to his posterior leg is gradually improving, area decreasing.  The abrasion to his left shin is now open, was covered with scab last week.  Sacral wound improving slowly, increase granulation, slight decrease in area.  Home health had messaged that they were concerned for infection due to odor.  I did not appreciate any significant odor today.  We did add Puracyn gel to the wound bed prior to reapplying VAC.  Anjum continues to spend most of his day up in his wheelchair, though tries to reposition frequently, and is wearing heel float boots bilaterally at all times.    8/19/2022 : Clinic visit with ADILSON Arthur,  HOLDEN, IMAN, LILIYA.   Turk states he is in his usual state of health, feeling well overall.  All of his wounds are progressing, though very slowly.  He continues to spend most of the day up in his wheelchair.  He does know to shift his weight frequently, and has an appropriate pressure relief cushion in his chair.    He was seen by [facility] earlier this week, for complaints of leakage around his suprapubic catheter and fever.  He was prescribed Keflex, and his catheter was changed    8/26/2022: Clinic visit with ADILSON Flores.  Patient reports overall he is feeling well denies any fever or chills.  Patient reports that he is continuously wearing Prevalon boots to offload bilateral lower extremities.  The left medial lower extremity wound is quite boggy with a small amount of turbulent fluid which was expressed with minimal palpation to the periwound area.  There is no foul-smelling odor emanating from the wound bed there is no erythema or edema.    9/2/2022 : Clinic visit with ADILSON Arthur, HOLDEN, IMAN, LILIYA.   States he is feeling well overall, denies fevers, chills, nausea, vomiting, cough or shortness of breath.  Unfortunately, his wounds are not progressing significantly.  Leg wound presents with boggy tissue, and probes to bone.  Area and depth of sacral wound have not changed significantly, bone still exposed.  Previously has been seen by several different plastic surgeons, including Dr. Rodriguez, Dr. Nicolas,  Dr. Mott, Dr. Chaves, he was also referred to Dr. Browne at [facility].  None of these surgeons have agreed to see him thus far.    9/9/2022: Clinic visit with Steve Hodges MD. Patient reports doing ok. Denies any changes to health or symptoms of infection. Wounds are not progressing, leg wound can be probed to bone and tissue is boggy. Wound VAC to sacrum with bone still exposed. He reports previously being treated for OM for 6 weeks without resolution. Discussed possibly getting  imaging to eval for OM, however with chronic OM there is limited to no role for prolonged Abx therapy per IDSA guidelines.    9/16/2022: Clinic visit with Steve Hodges MD. Patient reports feeling his normal state of health, denies any fevers or chills. His medial left leg wound is boggy and I was able to express some tan fluid concerning for purulence. Will take wound culture and order abx as appropriate for the results. Will not start empirical Abx as no evidence of cellulitis and his history of multiple rounds of Abx in the past. Will order CT scan of lower extremity and sacrum to further evaluate areas. Sacrum still to bone, had previous history of some kind of retained material in wound bed, will obtain the CT scan to evaluate.    9/23/2022: Clinic visit with Steve Hodges MD. Patient reports doing ok, denies any fevers or chills. Patient is taking Augmentin, reports tolerating well without any side effects. Tissue quality leg wound remains poor, still with some purulence able to be expressed but less than last week. Patient has CT scan planned for 10/3 and had labs today to ensure adequate renal function. Sacrum measuring smaller, with larger undermining.    9/30/2022 : Clinic visit with ADILSON Arthur, HOLDEN, IMAN, LILIYA.   Anjum states that he is feeling well.  He is still taking Augmentin, reports no adverse effects.  Purulent drainage still noted from lower leg wound, with boggy, dusky tissue in the wound bed.  We will extend Augmentin and additional 2 weeks.   Opening of sacral wound has decreased since last assessment, increase granulation within the wound bed noted, however bone still exposed.  His CT is scheduled for Monday 10/3, of both sacrum and his lower leg.    10/7/2022 : Clinic visit with ADILSON Arthur, HOLDEN, IMAN, LILIYA.   Anjum states he is feeling well, offers no complaints.  CT results discussed with him in clinic today, as well as recommendations from interdisciplinary rounds  a few days ago.  He was agreeable to allowing for today I&D of his leg wound today.  Will place referral to Dr. Saini for consideration of plastic intervention.  Patient states that if he were to undergo a flap procedure, he would be willing to be admitted to a skilled nursing facility long enough to allow for healing.   By removed a small chip of bone from leg wound during I&D.  Tissue is dusky and friable.  Will refer patient back to Dr. Raman, orthopedics, for reevaluation.    10/14/2022: Clinic visit with Steve Hodges MD. Patient reports doing well, denies any fevers or chills. Patient reports that his home health nurse feels leg wound improving, appears unchanged and probes to bone. CT scan of leg without definitive evidence of OM, was referred back to orthopedics with Dr. Raman but does not have appointment yet. He was also referred to Dr. Saini, does not have appointment.    10/21/2022: Clinic visit with Steve Hodges MD. Patient reports doing well, denies any symptoms of infection. He presents with left posterior ear skin tag and asked if I could address in clinic as it causes him discomfort when wearing mask. No significant change to left lower extremity wound or sacral wound. He does report having appointment with Dr. Raman 11/14 and with Dr. Saini sometime in November. Patient was found to have near complete avulsion to right 2nd toenail, he does not recall any specific trauma but is insensate. He was also noted to have new stage II pressure injury left heel despite wearing prevalon boots. Counseled to continue offloading heel as much as possible, can place pillow under leg to completely offload heel.    11/4/2022: Clinic visit with Steve Hodges MD. Patient reports doing well, denies any fevers or chills and generally feels well. Patient had episode of Afib and was restarted on Xarelto by cardiology, reports plan for whatchman procedure at some point in future. When assessing left lower  extremity wound, he was noted to have brisk venous bleeding with removal of dressing. Was not debrided and pressure dressing applied with hemostasis achieved. Left heel has increased in depth to stage III pressure injury despite wearing prevalon boots 24 hours a day, his Meliplex was not in place today. We discussed further offloading with pillow under left leg to allow for heel to be frefloated off mattress. Sacrum largely unchanged.   Reports appointment with Dr. Saini next week and appointment with Dr. Raman on 11/14.    11/11/2022: Clinic visit with Steve Hodges MD. Patient reports feeling well, denies any fevers or chills. Wound to his left leg appear worsening, now two distinct wounds that probe to bone and communicate. He has appointment with Dr. Raman next week. Counseled him to possibly consider amputation, reports that he has been discussing with Dr. Raman.   Sacrum remains largely unchanged. He is being evaluated for possible closure by Dr. Saini. I discussed case with Dr. Saini and he will evaluate wound care pictures to determine if he is candidate for surgical intervention.    11/18/2022: Clinic visit with Steve Hodges MD. Patient reports doing well. He has scheduled RICHARD and probable watchman device placement by interventional cardiology on Tuesday next week, he expects to be admitted through Wednesday morning. I called and coordinated with inpatient wound care team to have wound VAC and dressings changed while admitted. Sacral wound remains stable, measures roughly the same. Has not had follow up with Dr. Saini.   Patient was seen by Dr. Raman, as there does not appear to be acute infection he recommends continuing wound care but will reevaluate patient in 6 weeks and if no improvement may proceed with surgical I&D. Tissue quality remains poor and friable. Wound probes to bone.     12/2/2022 : Clinic visit with ADILSON Arthur, FNP-BC, CWDINESHN, CFCN.   Anjum continues to feel well  overall.  He is now driving himself to appointments.  [facility], however [facility] will be [facility], starting on Monday.  No significant changes to his wounds.  He has not heard anything from Dr. Saini about possible surgical intervention.    12/9/2022 : Clinic visit with ADILSON Arthur, DAT-BC, ALEXN, CFTRACI.   Anjum states he is feeling very well today.  His lateral leg wound i has deteriorated, area has increased,, tissue quality poor.  Increased granulation tissue noted to sacral wound.  There appears to be new granulation over bone.   Time spent today discussing possible surgical intervention.  I did find out that direct admission to [facility] might be possible, where Dr. Saini is medical director.  Plan would be for patient to undergo surgical debridement, possible veraflo placement, and antibiotics.  Anjum is open to this idea, but states he would not consider until after the holidays.    12/16/2022: Clinic visit with Steve Hodges MD. Patient denies any complaints. Tissue quality left leg remains poor and friable. Sacral pressure injury with increased slough today, slowly improving granulation tissue over bone.   I messaged Dr. Saini today, he will evaluate patient following the new year for possible reconstruction, we had discussed possible admission to [facility] in future.     REVIEW OF SYSTEMS:   Unchanged from previous wound clinic visit on 12/9/2022, except otherwise noted above.    PHYSICAL EXAMINATION:   BP (!) 153/95 Comment: RN Notified  Pulse (!) 55   Temp 36.4 °C (97.6 °F) (Temporal)   Resp 17   SpO2 97%   Physical Exam  Constitutional:       Appearance: He is obese.   Cardiovascular:      Rate and Rhythm: Bradycardia present.   Pulmonary:      Effort: Pulmonary effort is normal. No respiratory distress.      Breath sounds: No wheezing.   Skin:     Comments: Stage IV coccygeal pressure injury - Increased slough over new granulation tissue  forming.  Area and depth of wound and tracts about the same.  Periwound remains intact without signs or symptoms of infection    Full-thickness wound to left medial lower leg ALT now 1 continuous wound- Continues to have boggy friable tissue.  Moderate serosanguineous drainage, no odor, no periwound erythema or induration.    Stage III pressure injury left posterior heel -remains stable  Left anterior lower leg wounds are stable, nearly resolved  Left posterior/lateral lower leg wounds-remains stable    Refer to wound flowsheet and photos   Neurological:      Mental Status: He is alert and oriented to person, place, and time.       WOUND ASSESSMENT  Wound 04/22/22 Pressure Injury Sacrum POA stage 4 (Active)   Wound Image    12/16/22 1400   Site Assessment Red;Yellow;Other (Comment) 12/16/22 1400   Periwound Assessment Scar tissue;Fragile;Satellite lesions;Other (Comment) 12/16/22 1400   Margins Unattached edges 12/16/22 1400   Drainage Amount Moderate 12/16/22 1400   Drainage Description Serosanguineous 12/16/22 1400   Treatments Cleansed;Provider debridement;Site care 12/16/22 1400   Wound Cleansing Puracyn [place] 12/16/22 1400   Periwound Protectant Skin Protectant Wipes to Periwound;Paste Ring;Drape 12/16/22 1400   Dressing Cleansing/Solutions Not Applicable 12/16/22 1400   Dressing Options Wound Vac;Mepilex 12/16/22 1400   Dressing Changed Changed 12/16/22 1400   Dressing Change/Treatment Frequency Monday, Wednesday, Friday, and As Needed 12/09/22 1300   WOUND NURSE ONLY - Pressure Injury Stage 4 12/16/22 1400   Wound Length (cm) 2 cm 12/16/22 1400   Wound Width (cm) 2 cm 12/16/22 1400   Wound Depth (cm) 1.5 cm 12/16/22 1400   Wound Surface Area (cm^2) 4 cm^2 12/16/22 1400   Wound Volume (cm^3) 6 cm^3 12/16/22 1400   Post-Procedure Length (cm) 2 cm 12/16/22 1400   Post-Procedure Width (cm) 2 cm 12/16/22 1400   Post-Procedure Depth (cm) 1.5 cm 12/16/22 1400   Post-Procedure Surface Area (cm^2) 4 cm^2 12/16/22 1400    Post-Procedure Volume (cm^3) 6 cm^3 12/16/22 1400   Wound Healing % -150 12/16/22 1400   Tunneling (cm) 0 cm 12/16/22 1400   Tunneling Clock Position of Wound 9 11/04/22 1300   Undermining (cm) 2.3 cm 12/16/22 1400   Undermining of Wound, 1st Location From 9 o'clock;To 3 o'clock 12/16/22 1400   Wound Odor None 12/16/22 1400   Exposed Structures Bone 12/16/22 1400   Number of days: 239       Wound 07/22/22 Traumatic Leg Anterior Left (Active)   Wound Image   12/16/22 1400   Site Assessment Purple 12/16/22 1400   Periwound Assessment Dry;Intact 12/16/22 1400   Margins Attached edges 08/19/22 1400   Drainage Amount None 12/16/22 1400   Drainage Description Serosanguineous 08/19/22 1400   Treatments Cleansed;Site care 12/16/22 1400   Wound Cleansing Puracyn [place] 12/16/22 1400   Periwound Protectant Skin Protectant Wipes to Periwound 12/16/22 1400   Dressing Cleansing/Solutions Not Applicable 12/16/22 1400   Dressing Options Silicone Adhesive Foam;Tubigrip 12/16/22 1400   Dressing Changed Changed 12/16/22 1400   Dressing Change/Treatment Frequency Monday, Wednesday, Friday, and As Needed 12/09/22 1300   WOUND NURSE ONLY - Pressure Injury Stage DTPI 12/09/22 1300   Non-staged Wound Description Full thickness 08/12/22 1300   Wound Length (cm) 0.4 cm 08/19/22 1400   Wound Width (cm) 0.4 cm 08/19/22 1400   Wound Depth (cm) 0.1 cm 08/19/22 1400   Wound Surface Area (cm^2) 0.16 cm^2 08/19/22 1400   Wound Volume (cm^3) 0.016 cm^3 08/19/22 1400   Post-Procedure Length (cm) 0.9 cm 08/12/22 1300   Post-Procedure Width (cm) 0.5 cm 08/12/22 1300   Post-Procedure Depth (cm) 0.1 cm 08/12/22 1300   Post-Procedure Surface Area (cm^2) 0.45 cm^2 08/12/22 1300   Post-Procedure Volume (cm^3) 0.045 cm^3 08/12/22 1300   Tunneling (cm) 0 cm 12/02/22 1527   Undermining (cm) 0 cm 12/02/22 1527   Wound Odor None 12/16/22 1400   Exposed Structures None 12/16/22 1400   Number of days: 148       Wound 07/29/22 Pressure Injury Leg  Posterior;Lateral Left (Active)   Wound Image   12/16/22 1400   Site Assessment Purple 12/16/22 1400   Periwound Assessment Dry;Intact 12/16/22 1400   Drainage Amount None 12/16/22 1400   Drainage Description Sanguineous 08/12/22 1300   Treatments Cleansed;Site care 12/16/22 1400   Wound Cleansing Puracyn [place] 12/16/22 1400   Periwound Protectant Skin Protectant Wipes to Periwound 12/16/22 1400   Dressing Cleansing/Solutions Not Applicable 12/16/22 1400   Dressing Options Mepilex;Tubigrip 12/16/22 1400   Dressing Changed Changed 12/16/22 1400   Dressing Change/Treatment Frequency Monday, Wednesday, Friday, and As Needed 12/09/22 1300   WOUND NURSE ONLY - Pressure Injury Stage DTPI 12/09/22 1300   Wound Length (cm) 0.1 cm 08/12/22 1300   Wound Width (cm) 0.1 cm 08/12/22 1300   Wound Depth (cm) 0.1 cm 08/12/22 1300   Wound Surface Area (cm^2) 0.01 cm^2 08/12/22 1300   Wound Volume (cm^3) 0.001 cm^3 08/12/22 1300   Tunneling (cm) 0 cm 12/02/22 1527   Undermining (cm) 0 cm 12/02/22 1527   Wound Odor None 12/16/22 1400   Exposed Structures None 12/16/22 1400   Number of days: 141       Wound 10/21/22 Pressure Injury Left Posterior Heel stage 3 (Active)   Wound Image   12/16/22 1400   Site Assessment Fragile;Other (Comment) 12/16/22 1400   Periwound Assessment Fragile;Scar tissue 12/16/22 1400   Margins Other (Comment) 12/09/22 1300   Drainage Amount None 12/16/22 1400   Drainage Description Serosanguineous 11/18/22 1300   Treatments Cleansed;Site care 12/16/22 1400   Wound Cleansing Puracyn [place] 12/16/22 1400   Periwound Protectant Skin Protectant Wipes to Periwound;Barrier Paste 12/16/22 1400   Dressing Cleansing/Solutions Not Applicable 12/16/22 1400   Dressing Options Mepilex Heel;Tubigrip 12/16/22 1400   Dressing Changed Changed 12/16/22 1400   Dressing Status Clean;Dry;Intact 11/04/22 1300   Dressing Change/Treatment Frequency Monday, Wednesday, Friday, and As Needed 12/09/22 1300   WOUND NURSE ONLY - Pressure  Injury Stage 3 12/09/22 1300   Non-staged Wound Description Not applicable 11/18/22 1300   Wound Length (cm) 2 cm 11/18/22 1300   Wound Width (cm) 2 cm 11/18/22 1300   Wound Depth (cm) 0.1 cm 11/18/22 1300   Wound Surface Area (cm^2) 4 cm^2 11/18/22 1300   Wound Volume (cm^3) 0.4 cm^3 11/18/22 1300   Post-Procedure Length (cm) 2 cm 11/11/22 1300   Post-Procedure Width (cm) 2.3 cm 11/11/22 1300   Post-Procedure Depth (cm) 0.2 cm 11/11/22 1300   Post-Procedure Surface Area (cm^2) 4.6 cm^2 11/11/22 1300   Post-Procedure Volume (cm^3) 0.92 cm^3 11/11/22 1300   Wound Healing % -2567 11/18/22 1300   Tunneling (cm) 0 cm 12/02/22 1527   Undermining (cm) 0 cm 12/02/22 1527   Wound Odor None 12/16/22 1400   Exposed Structures None 12/16/22 1400   Number of days: 57       Wound 11/11/22 LLE Medial (anterior and posterior merged) (Active)   Wound Image    12/16/22 1400   Site Assessment Red;Boggy;Fragile 12/16/22 1400   Periwound Assessment Fragile;Scar tissue;Red 12/16/22 1400   Margins Unattached edges 12/16/22 1400   Drainage Amount Moderate 12/16/22 1400   Drainage Description Serosanguineous 12/16/22 1400   Treatments Cleansed;Provider debridement;Site care 12/16/22 1400   Wound Cleansing Puracyn [place] 12/16/22 1400   Periwound Protectant Skin Protectant Wipes to Periwound;Barrier Paste 12/16/22 1400   Dressing Cleansing/Solutions Not Applicable 12/16/22 1400   Dressing Options Hydrofera Blue Ready;Mepilex;Tubigrip 12/16/22 1400   Dressing Changed Changed 12/16/22 1400   Dressing Change/Treatment Frequency Monday, Wednesday, Friday, and As Needed 12/09/22 1300   Non-staged Wound Description Full thickness 12/16/22 1400   Wound Length (cm) 2.7 cm 12/16/22 1400   Wound Width (cm) 3.5 cm 12/16/22 1400   Wound Depth (cm) 1.1 cm 12/16/22 1400   Wound Surface Area (cm^2) 9.45 cm^2 12/16/22 1400   Wound Volume (cm^3) 10.395 cm^3 12/16/22 1400   Post-Procedure Length (cm) 2.7 cm 12/16/22 1400   Post-Procedure Width (cm) 3.5 cm  12/16/22 1400   Post-Procedure Depth (cm) 1.2 cm 12/16/22 1400   Post-Procedure Surface Area (cm^2) 9.45 cm^2 12/16/22 1400   Post-Procedure Volume (cm^3) 11.34 cm^3 12/16/22 1400   Wound Healing % -1447 12/16/22 1400   Tunneling (cm) 0 cm 12/16/22 1400   Tunneling Clock Position of Wound 9 11/11/22 1300   Undermining (cm) 0 cm 12/16/22 1400   Wound Odor Mild 12/16/22 1400   Exposed Structures Bone 12/16/22 1400   Number of days: 36     PROCEDURE: Excisional debridement of sacrococcygeal wound  -Declined lidocaine as he is insensate  -Curette used to debride wound bed.  Excisional debridement was performed to remove devitalized tissue until healthy, bleeding tissue was visualized.  Debridement was carried into the undermined areas of the sacral wound.  Total area debrided approximately 4 cm²  (including into wound undermining).  Deepest level of debridement was to muscle / fascia.  -Bleeding controlled with manual pressure.    -Wound care completed by wound RN, refer to flowsheet  -Patient tolerated the procedure well, without c/o pain or discomfort.     PROCEDURE: Excisional debridement of left medial lower leg wound  - topical anesthesia not required, area is insensate  -Curette used to debride wound bed.  Excisional debridement was performed to remove devitalized tissue until healthy, bleeding tissue was visualized.   Entire surface of wounds, 9.45 cm² debrided.  Tissue debrided into the muscle layer.    -Bleeding controlled with manual pressure.    -Wound care completed by wound RN, refer to flowsheet  -Patient tolerated the procedure well, without c/o pain or discomfort.      No debridement of left posterior heel, left anterior leg, or left posterior/lateral leg    BIOLOGIC LOG  5/20/2022-first application.  PRODUCT: EpiCord 2 x 3 cm . PRODUCT #[id number]; EXPIRES: 2026-12-01 5/27/2022- second application.  PRODUCT: EpiCordEX 2 x 3 cm . PRODUCT #[id number]; EXPIRES: 2026-09-01    6/3/2022-  third application.  PRODUCT: EpiCordEX 2 x 3 cm . PRODUCT #[id number]; EXPIRES: 2026-10-01    6/10/2022- fourth application.  PRODUCT: EpiCordEX 2 x 3 cm . PRODUCT #[id number]; EXPIRES: 2026-09-01 6/17/2022- fifth application.  PRODUCT: EpiCordEX 2 x 3 cm . PRODUCT #[id number]; EXPIRES: 2027-02-01 6/24/2022- sixth application.  PRODUCT: EpiCordEX 2 x 3 cm . PRODUCT #[id number]; EXPIRES: 2027-02-01 07/01/22: Seventh application.  Product: Epicort Dx 2.0 x 3.0 cm reorder number [id number]; product number [id number]; expires 2027-02-01 7/22/2022- eighth application.  PRODUCT: EpiCord 2 x 3 cm, reorder #[id number]. PRODUCT #[id number]; EXPIRES: 2027-02-01      Pertinent Labs and Diagnostics:    Labs:     A1c: No results found for: HBA1C     [facility] results, 7/1/2022 (under media tab)    CRP 13    ESR 31      IMAGING:     10/3/2022-CT of pelvis with contrast  IMPRESSION:     1.  Posterior soft tissue sacral wound and 1.5 x 3.7 cm abscess.   2.  Progressive destruction of the distal sacrum and proximal coccyx. Sclerosis and irregularity of the distal sacrum consistent with osteomyelitis.   3.  Old wound within the soft tissues posterior to the distal left SI joint with possible fistulous communication.    10/3/2022-CT of tib-fib with and without contrast, with reconstruction  IMPRESSION:     1.  Old fractures of the left tibia and fibula with extensive callus formation and bony bridging as well as extensive dystrophic calcifications. Similar to previous.   2.  Distal medial soft tissue wounds with ill-defined superficial in the soft tissue thickening and fluid collections suggestive of phlegmon. No organized abscess identified.   3.  No definite osteomyelitis.   4.  Arthritic changes.        VASCULAR STUDIES: No results found.    LAST  WOUND CULTURE:   Lab Results   Component Value Date/Time    CULTRSULT Usual urogenital ade 10-50,000 cfu/mL 11/17/2022 03:30 PM           ASSESSMENT AND PLAN:     1. Sacral decubitus ulcer, stage IV ([facility])  Comments: Ulcer first noted in early April 2022 as small open area which quickly enlarged.  This ulcer is present distal from previous sacral ulcer which healed after surgery in January.  Patient has history of flap reconstruction x2 to this area.  CT shows progressive destruction of distal sacrum and proximal coccyx.    12/16/2022: Wound area and depth largely unchanged. It appears that there is some new granulation over bone with increased slough today.  -Excisional debridement of wound in clinic today, medically necessary to promote wound healing  -Patient is to return to clinic weekly for assessment and debridement   -Home health to continue to change the wound VAC dressing 2 times per week in between clinic visits.  - Discussed case with Dr. Saini, we discussed options for possible DEE DEE admission. He will re-evaluate patient following the holidays. At this point patient has all offloading surfaces, through he does spend too much time in his wheelchair which is limiting his recovery.    Wound care: NPWT at -125 mmHg to accelerate granulation, change 3 times per week.    2. Postoperative wound dehiscence, subsequent encounter  Comments: On 4/26 patient underwent irrigation and debridement of multiple compartments of the left lower extremity states for pressure injury, with bone excision, and complex closure of chronic wound using biologic skin substitute.  During postop visit in surgeons office on 5/11, surgical site was noted to be dehisced.  Patient was referred to wound clinic for management.    12/16/2022: WTissue quality remains poor, friable and edematous.  -Excisional debridement of wound in clinic today, medically necessary to promote wound healing.  -Patient to return to clinic weekly for assessment and debridement as needed  -Home health to change dressing 1-2 times per week in between clinic visits   -Patient to follow-up with  Dr. Raman in 4 to 6 weeks if no improvement  -Consider resuming biologic and/or VAC, do not think that tissue quality is sufficient for biologic.  -I suspect pressure may be an issue still.  Patient states he is wearing Prevalon boot at all times    Wound care: Silver Hydrofiber to manage exudate and bioburden, Mepilex, Tubigrip D to manage edema    3. Deep tissue injury  4. Pressure injury of left leg, unstageable ([facility])  Comments: DTI to posterior left lower leg first observed in clinic 7/29/2022.  Patient does not know how it started, had been wearing heel float boot consistently.    12/16/2022: Maintenance stable.  No need for debridement today  -Continue to monitor for any deterioration in tissue quality  -Patient is currently wearing Prevalon boots to help prevent pressure injuries to bilateral lower extremities    Wound care: Mepilex, Tubigrip D    5. Pressure injury of left heel, stage 3 ([facility])  Comments: First noted in clinic on 10/21/2022, originally noted to be stage II    12/16/2022: Remains stable, skin intact.  -No need for debridement today  -He is wearing Prevalon boots in clinic today  -Monitor each clinic visit as this area is vulnerable to pressure injury     Wound Care: Heel Mepilex to reduce pressure and friction    6. Quadriplegia, C5-C7, complete ([facility])  Comments: Complicating factor.  Impaired mobility and sensation  -Patient is still spending 7-8 hours/day up in his wheelchair, though he does have appropriate offloading cushion, and knows to reposition frequently.  Wears heel float boots bilaterally at all times    My total time spent caring for the patient on the day of the encounter was 20 minutes, reviewing history, assessment, counseling and education, and coordination of care.  This does not include time spent on separately billable procedures/tests.      Please note that this note may have been created using voice recognition software. I have worked with technical experts from [facility] to optimize the interface.  I have made every reasonable attempt to correct obvious errors, but there may be errors of grammar and possibly content that I did not discover before finalizing the note.

## 2022-12-18 PROBLEM — D68.318 ACQUIRED CIRCULATING ANTICOAGULANTS (HCC): Status: ACTIVE | Noted: 2022-12-18

## 2022-12-29 ENCOUNTER — OFFICE VISIT (OUTPATIENT)
Dept: CARDIOLOGY | Facility: MEDICAL CENTER | Age: 72
End: 2022-12-29
Payer: MEDICARE

## 2022-12-29 VITALS
RESPIRATION RATE: 14 BRPM | SYSTOLIC BLOOD PRESSURE: 126 MMHG | HEART RATE: 57 BPM | DIASTOLIC BLOOD PRESSURE: 82 MMHG | HEIGHT: 70 IN | WEIGHT: 238.1 LBS | OXYGEN SATURATION: 97 % | BODY MASS INDEX: 34.09 KG/M2

## 2022-12-29 DIAGNOSIS — I48.4 ATYPICAL ATRIAL FLUTTER (HCC): ICD-10-CM

## 2022-12-29 DIAGNOSIS — Z45.09 ENCOUNTER FOR LOOP RECORDER CHECK: ICD-10-CM

## 2022-12-29 PROCEDURE — 99214 OFFICE O/P EST MOD 30 MIN: CPT | Mod: 25 | Performed by: INTERNAL MEDICINE

## 2022-12-29 PROCEDURE — 93291 INTERROG DEV EVAL SCRMS IP: CPT | Performed by: INTERNAL MEDICINE

## 2022-12-29 PROCEDURE — 93000 ELECTROCARDIOGRAM COMPLETE: CPT | Mod: 59 | Performed by: INTERNAL MEDICINE

## 2022-12-29 ASSESSMENT — FIBROSIS 4 INDEX: FIB4 SCORE: 2.96

## 2022-12-30 ENCOUNTER — OFFICE VISIT (OUTPATIENT)
Dept: WOUND CARE | Facility: MEDICAL CENTER | Age: 72
End: 2022-12-30
Attending: INTERNAL MEDICINE
Payer: MEDICARE

## 2022-12-30 VITALS
TEMPERATURE: 98.2 F | HEART RATE: 65 BPM | RESPIRATION RATE: 20 BRPM | OXYGEN SATURATION: 94 % | SYSTOLIC BLOOD PRESSURE: 113 MMHG | DIASTOLIC BLOOD PRESSURE: 77 MMHG

## 2022-12-30 DIAGNOSIS — L89.623 PRESSURE INJURY OF LEFT HEEL, STAGE 3 (HCC): ICD-10-CM

## 2022-12-30 DIAGNOSIS — G82.53 QUADRIPLEGIA, C5-C7, COMPLETE (HCC): ICD-10-CM

## 2022-12-30 DIAGNOSIS — T81.31XD POSTOPERATIVE WOUND DEHISCENCE, SUBSEQUENT ENCOUNTER: ICD-10-CM

## 2022-12-30 DIAGNOSIS — T14.8XXA DEEP TISSUE INJURY: ICD-10-CM

## 2022-12-30 DIAGNOSIS — L89.890 PRESSURE INJURY OF LEFT LEG, UNSTAGEABLE (HCC): ICD-10-CM

## 2022-12-30 DIAGNOSIS — L89.154 SACRAL DECUBITUS ULCER, STAGE IV (HCC): ICD-10-CM

## 2022-12-30 LAB — EKG IMPRESSION: NORMAL

## 2022-12-30 PROCEDURE — 11042 DBRDMT SUBQ TIS 1ST 20SQCM/<: CPT | Performed by: STUDENT IN AN ORGANIZED HEALTH CARE EDUCATION/TRAINING PROGRAM

## 2022-12-30 PROCEDURE — 11043 DBRDMT MUSC&/FSCA 1ST 20/<: CPT | Performed by: STUDENT IN AN ORGANIZED HEALTH CARE EDUCATION/TRAINING PROGRAM

## 2022-12-30 PROCEDURE — 11042 DBRDMT SUBQ TIS 1ST 20SQCM/<: CPT

## 2022-12-30 NOTE — PROGRESS NOTES
Provider Encounter- Pressure Injury        HISTORY OF PRESENT ILLNESS  Wound History:    START OF CARE IN CLINIC: 5/3/2022    REFERRING PROVIDER: Sahara Mendez      WOUNDS- Pressure Injury, Sacrococcygeal, stage IV                                                             Surgical wound dehiscence, left medial lower leg-status post surgical I&D of stage IV pressure injury and           biologic application                    Pressure injury, DTI, left posterior lower leg-first observed in clinic 7/29/2022       Pressure injury stage II L heel - first noted 10/21     Right 2nd toe avulsion - first noted 10/21     Skin tag left posterior ear - first noted 10/21     HISTORY: Patient with history of incomplete quadriplegia referred to [facility] for treatment of a stage IV pressure injury.  He has a history of previous pressure injuries to this area, and underwent muscle flaps in 2019, and then again in 2020.  He was seen in the wound clinic in November 2021 for an ulcer proximal from his current ulcer, and pressure injuries to his left posterior lower leg and left heel.  At that time, it was discovered that the patient had retained VAC foam embedded in the wound bed of the sacral wound.  Attempts were made to get him back to his plastic surgeon, though unsuccessful.  In January he underwent surgical removal of VAC sponge along with excisional debridement of his sacral wound by Dr. Chaves.  After the surgery, his wound went on to heal without incident.   In early April 2022, his home health nurse noted a new sacral ulcer, below the previous ulcer which quickly tripled in size over the following weeks.  The ulcer to his left medial lower leg had also deteriorated, with bone visible at the base..  He was hospitalized from 4/22 until 4/27/2022 and underwent surgery with Dr. Raman on 4/26 for irrigation and debridement of multiple compartments of the left lower extremity, bone excision, and complex closure of chronic wound  using biologic skin substitute.   His sacrococcygeal wound was not surgically addressed during this admission.  He was discharged back to his group home, with home health, and referral to outpatient wound clinic for his sacral wound.  He was instructed to follow-up with his surgeon for his lower leg wound.       Postoperatively, the left medial lower leg incision dehisced.  He was seen by his surgeon at [facility] on 5/11.  The surgeon opted to leave remaining sutures in place, and refer him to the wound clinic for treatment of this wound.   Treatment of this wound was initiated in clinic on 5/12.  During this visit was also noted that his heel DTI had resolved, but that he had a new pressure injury to his left posterior lower extremity.     A new pressure injury was noted to patient's right upper buttock/lower back on 5/20/2022.  Wound was linear in shape, skin discolored but intact.     Abrasion noted to left anterior lower leg.  First observed in clinic on 7/22/2022.  Patient states he bumped his leg into his food tray.     Small DTI noted to patient's left lateral lower leg on 7/29/2022.  Skin intact but discolored.     Large area of deep tissue injury noted to patient's left exterior lower leg.  Patient denied any trauma to this area.  Skin intact.  Wound documented.    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  Never used smokeless tobacco    Patient's problem list, allergies, and current medications reviewed and updated in Epic    Interval History:  Interval History thinned 7/29/2022.  Please see previous notes for complete interval history.     7/29/2022 : Clinic visit with Kelly  ADILSON Sandy, HOLDEN, IMAN, CFCN.  Anjum states he continues to do well.  He was able to go to [employer] yesterday, spent today [place].  His left lower extremity wound has deteriorated, presents today with significant odor and deterioration of tissue.  VAC held from this wound today after debridement.  I also did not apply biologic today.   Sacral wound about the same in area, though some evidence of increasing granulation.  No evidence of infection.  Small abrasion to his left lower leg appears to be improving.  He has a new small DTI to his left lateral lower leg, skin intact but discolored.    8/5/2022 : Clinic visit with ADILSON Arthur, HOLDEN, ALEXN, CFCN.  Anjum continues to feel well.  His left lower wound has improved with VAC hold, will discontinue from this wound altogether.  I did not apply biologic to this wound today given its current improvement.  DTI to posterior left leg is a bit darker today, skin still intact.  Patient states he has been wearing his heel float boot at all times.  Sacral wound with increased granulation, slight decrease in depth, bone still exposed.    8/12/2022 : Clinic visit with ADILSON Arthur, HOLDEN, IMAN, CFTRACI.   Anjum states he is feeling well.  His left lower leg wound continues to improve without the use of VAC or biologic.  The deep tissue injury to his posterior leg is gradually improving, area decreasing.  The abrasion to his left shin is now open, was covered with scab last week.  Sacral wound improving slowly, increase granulation, slight decrease in area.  Home health had messaged that they were concerned for infection due to odor.  I did not appreciate any significant odor today.  We did add Puracyn gel to the wound bed prior to reapplying VAC.  Anjum continues to spend most of his day up in his wheelchair, though tries to reposition frequently, and is wearing heel float boots bilaterally at all times.    8/19/2022 : Clinic visit with ADILSON Arthur,  HOLDEN, IMAN, LILIYA.   Turk states he is in his usual state of health, feeling well overall.  All of his wounds are progressing, though very slowly.  He continues to spend most of the day up in his wheelchair.  He does know to shift his weight frequently, and has an appropriate pressure relief cushion in his chair.    [facility], for complaints of leakage around his suprapubic catheter and fever.  He was prescribed Keflex, and his catheter was changed    8/26/2022: Clinic visit with ADILSON Flores.  Patient reports overall he is feeling well denies any fever or chills.  Patient reports that he is continuously wearing Prevalon boots to offload bilateral lower extremities.  The left medial lower extremity wound is quite boggy with a small amount of turbulent fluid which was expressed with minimal palpation to the periwound area.  There is no foul-smelling odor emanating from the wound bed there is no erythema or edema.    9/2/2022 : Clinic visit with ADILSON Arthur, HOLDEN, IMAN, LILIYA.   States he is feeling well overall, denies fevers, chills, nausea, vomiting, cough or shortness of breath.  Unfortunately, his wounds are not progressing significantly.  Leg wound presents with boggy tissue, and probes to bone.  Area and depth of sacral wound have not changed significantly, bone still exposed.  Previously has been seen by several different plastic surgeons, including Dr. Rodriguez, Dr. Nicolas,  Dr. Mott, Dr. Chaves, he was also referred to Dr. Browne at [facility].  None of these surgeons have agreed to see him thus far.    9/9/2022: Clinic visit with Steve Hodges MD. Patient reports doing ok. Denies any changes to health or symptoms of infection. Wounds are not progressing, leg wound can be probed to bone and tissue is boggy. Wound VAC to sacrum with bone still exposed. He reports previously being treated for OM for 6 weeks without resolution. Discussed possibly getting  imaging to eval for OM, however with chronic OM there is limited to no role for prolonged Abx therapy per IDSA guidelines.    9/16/2022: Clinic visit with Steve Hodges MD. Patient reports feeling his normal state of health, denies any fevers or chills. His medial left leg wound is boggy and I was able to express some tan fluid concerning for purulence. Will take wound culture and order abx as appropriate for the results. Will not start empirical Abx as no evidence of cellulitis and his history of multiple rounds of Abx in the past. Will order CT scan of lower extremity and sacrum to further evaluate areas. Sacrum still to bone, had previous history of some kind of retained material in wound bed, will obtain the CT scan to evaluate.    9/23/2022: Clinic visit with Steve Hodges MD. Patient reports doing ok, denies any fevers or chills. Patient is taking Augmentin, reports tolerating well without any side effects. Tissue quality leg wound remains poor, still with some purulence able to be expressed but less than last week. Patient has CT scan planned for 10/3 and had labs today to ensure adequate renal function. Sacrum measuring smaller, with larger undermining.    9/30/2022 : Clinic visit with ADILSON Arthur, HOLDEN, IMAN, LILIYA.   Anjum states that he is feeling well.  He is still taking Augmentin, reports no adverse effects.  Purulent drainage still noted from lower leg wound, with boggy, dusky tissue in the wound bed.  We will extend Augmentin and additional 2 weeks.   Opening of sacral wound has decreased since last assessment, increase granulation within the wound bed noted, however bone still exposed.  His CT is scheduled for Monday 10/3, of both sacrum and his lower leg.    10/7/2022 : Clinic visit with ADILSON Arthur, HOLDEN, IMAN, LILIYA.   Anjum states he is feeling well, offers no complaints.  CT results discussed with him in clinic today, as well as recommendations from interdisciplinary rounds  a few days ago.  He was agreeable to allowing for today I&D of his leg wound today.  Will place referral to Dr. Saini for consideration of plastic intervention.  Patient states that if he were to undergo a flap procedure, he would be willing to be admitted to a skilled nursing facility long enough to allow for healing.   By removed a small chip of bone from leg wound during I&D.  Tissue is dusky and friable.  Will refer patient back to Dr. Raman, orthopedics, for reevaluation.    10/14/2022: Clinic visit with Steve Hodges MD. Patient reports doing well, denies any fevers or chills. Patient reports that his home health nurse feels leg wound improving, appears unchanged and probes to bone. CT scan of leg without definitive evidence of OM, was referred back to orthopedics with Dr. Raman but does not have appointment yet. He was also referred to Dr. Saini, does not have appointment.    10/21/2022: Clinic visit with Steve Hodges MD. Patient reports doing well, denies any symptoms of infection. He presents with left posterior ear skin tag and asked if I could address in clinic as it causes him discomfort when wearing mask. No significant change to left lower extremity wound or sacral wound. He does report having appointment with Dr. Raman 11/14 and with Dr. Saini sometime in November. Patient was found to have near complete avulsion to right 2nd toenail, he does not recall any specific trauma but is insensate. He was also noted to have new stage II pressure injury left heel despite wearing prevalon boots. Counseled to continue offloading heel as much as possible, can place pillow under leg to completely offload heel.    11/4/2022: Clinic visit with Steve Hodges MD. Patient reports doing well, denies any fevers or chills and generally feels well. Patient had episode of Afib and was restarted on Xarelto by cardiology, reports plan for whatchman procedure at some point in future. When assessing left lower  extremity wound, he was noted to have brisk venous bleeding with removal of dressing. Was not debrided and pressure dressing applied with hemostasis achieved. Left heel has increased in depth to stage III pressure injury despite wearing prevalon boots 24 hours a day, his Meliplex was not in place today. We discussed further offloading with pillow under left leg to allow for heel to be frefloated off mattress. Sacrum largely unchanged.   Reports appointment with Dr. Saini next week and appointment with Dr. Raman on 11/14.    11/11/2022: Clinic visit with Steve Hodges MD. Patient reports feeling well, denies any fevers or chills. Wound to his left leg appear worsening, now two distinct wounds that probe to bone and communicate. He has appointment with Dr. Raman next week. Counseled him to possibly consider amputation, reports that he has been discussing with Dr. Raman.   Sacrum remains largely unchanged. He is being evaluated for possible closure by Dr. Saini. I discussed case with Dr. Saini and he will evaluate wound care pictures to determine if he is candidate for surgical intervention.    11/18/2022: Clinic visit with Steve Hodges MD. Patient reports doing well. He has scheduled RICHARD and probable watchman device placement by interventional cardiology on Tuesday next week, he expects to be admitted through Wednesday morning. I called and coordinated with inpatient wound care team to have wound VAC and dressings changed while admitted. Sacral wound remains stable, measures roughly the same. Has not had follow up with Dr. Saini.   Patient was seen by Dr. Raman, as there does not appear to be acute infection he recommends continuing wound care but will reevaluate patient in 6 weeks and if no improvement may proceed with surgical I&D. Tissue quality remains poor and friable. Wound probes to bone.     12/2/2022 : Clinic visit with ADILSON Arthur, FNP-BC, CWDINESHN, CFCN.   Anjum continues to feel well  overall.  He is now driving himself to appointments.  [facility], however [facility], starting on Monday.  No significant changes to his wounds.  He has not heard anything from Dr. Saini about possible surgical intervention.    12/9/2022 : Clinic visit with ADILSON Arthur, FNPEGGY-BC, CWDINESHN, CFTRACI.   Anjum states he is feeling very well today.  His lateral leg wound i has deteriorated, area has increased,, tissue quality poor.  Increased granulation tissue noted to sacral wound.  There appears to be new granulation over bone.   Time spent today discussing possible surgical intervention.  I did find out that direct admission to [facility] might be possible, where Dr. Saini is medical director.  Plan would be for patient to undergo surgical debridement, possible veraflo placement, and antibiotics.  Anjum is open to this idea, but states he would not consider until after the holidays.    12/16/2022: Clinic visit with Steve Hodges MD. Patient denies any complaints. Tissue quality left leg remains poor and friable. Sacral pressure injury with increased slough today, slowly improving granulation tissue over bone.   I messaged Dr. Saini today, he will evaluate patient following the new year for possible reconstruction, we had discussed possible admission to [facility] in future.    12/30/2022: Clinic visit with Steve Hodges MD. Patient reports feeling in normal state of health. He reports that he was up in chair more frequently this weekend due to holidays. He reports that his VAC was not alarming, but had drainage from VAC dressing that was getting on clothes and no drainage in VAC canister. It appears that due to skin fold and sitting on VAC prevented proper moisture management. Despite using prevalon boots his left heel DTI has reopened. Patient has appointment with Dr. Raman next week, he does not have appointment with Dr. Saini yet.       REVIEW OF  SYSTEMS:   Unchanged from previous wound clinic visit on 12/16/2022, except otherwise noted above.    PHYSICAL EXAMINATION:   /77   Pulse 65   Temp 36.8 °C (98.2 °F) (Temporal)   Resp 20   SpO2 94%   Physical Exam  Constitutional:       Appearance: He is obese.   Cardiovascular:      Rate and Rhythm: Bradycardia present.   Pulmonary:      Effort: Pulmonary effort is normal. No respiratory distress.      Breath sounds: No wheezing.   Skin:     Comments: Stage IV coccygeal pressure injury - Area and undermining is slightly smaller. Has some increased granulation tissue formation over bone. Tissue is bleeding today.    Full-thickness wound to left medial lower leg - now 1 continuous wound- Continues to have boggy friable tissue.  Moderate serosanguineous drainage, no odor, no periwound erythema or induration.      Stage III pressure injury left posterior heel - Remains open, but superficial.  Left anterior lower leg wounds are stable, nearly resolved  Left posterior/lateral lower leg wounds-remains stable    Refer to wound flowsheet and photos   Neurological:      Mental Status: He is alert and oriented to person, place, and time.       WOUND ASSESSMENT  Wound 04/22/22 Pressure Injury Sacrum POA stage 4 (Active)   Wound Image   12/30/22 1400   Site Assessment Red;Yellow;Other (Comment);Bleeding 12/30/22 1400   Periwound Assessment Scar tissue;Fragile 12/30/22 1400   Margins Unattached edges 12/30/22 1400   Drainage Amount Large 12/30/22 1400   Drainage Description Serosanguineous 12/30/22 1400   Treatments Cleansed;Site care 12/30/22 1400   Wound Cleansing Puracyn [place] 12/30/22 1400   Periwound Protectant Skin Protectant Wipes to Periwound;Barrier Paste 12/30/22 1400   Dressing Cleansing/Solutions Not Applicable 12/30/22 1400   Dressing Options Hydrofiber Silver Strip;Hydrofiber Silver;Mepilex 12/30/22 1400   Dressing Changed New 12/30/22 1400   Dressing Change/Treatment Frequency Monday, Wednesday, Friday,  and As Needed 12/30/22 1400   WOUND NURSE ONLY - Pressure Injury Stage 4 12/30/22 1400   Wound Length (cm) 1.5 cm 12/30/22 1400   Wound Width (cm) 1.5 cm 12/30/22 1400   Wound Depth (cm) 1.5 cm 12/30/22 1400   Wound Surface Area (cm^2) 2.25 cm^2 12/30/22 1400   Wound Volume (cm^3) 3.375 cm^3 12/30/22 1400   Post-Procedure Length (cm) 1.5 cm 12/30/22 1400   Post-Procedure Width (cm) 1.5 cm 12/30/22 1400   Post-Procedure Depth (cm) 1.5 cm 12/30/22 1400   Post-Procedure Surface Area (cm^2) 2.25 cm^2 12/30/22 1400   Post-Procedure Volume (cm^3) 3.375 cm^3 12/30/22 1400   Wound Healing % -41 12/30/22 1400   Tunneling (cm) 0 cm 12/30/22 1400   Tunneling Clock Position of Wound 9 11/04/22 1300   Undermining (cm) 2 cm 12/30/22 1400   Undermining of Wound, 1st Location From 9 o'clock;To 3 o'clock 12/30/22 1400   Wound Odor None 12/30/22 1400   Exposed Structures Bone 12/30/22 1400   Number of days: 253       Wound 07/22/22 Traumatic Leg Anterior Left (Active)   Wound Image   12/30/22 1400   Site Assessment Purple;Red 12/30/22 1400   Periwound Assessment Dry;Intact 12/30/22 1400   Margins Attached edges 08/19/22 1400   Drainage Amount None 12/30/22 1400   Drainage Description Serosanguineous 08/19/22 1400   Treatments Cleansed;Site care 12/30/22 1400   Wound Cleansing Puracyn [place] 12/30/22 1400   Periwound Protectant Skin Protectant Wipes to Periwound 12/30/22 1400   Dressing Cleansing/Solutions Not Applicable 12/30/22 1400   Dressing Options Tubigrip 12/30/22 1400   Dressing Changed Changed 12/30/22 1400   Dressing Change/Treatment Frequency Monday, Wednesday, Friday, and As Needed 12/30/22 1400   WOUND NURSE ONLY - Pressure Injury Stage DTPI 12/30/22 1400   Non-staged Wound Description Not applicable 12/30/22 1400   Wound Length (cm) 0.4 cm 08/19/22 1400   Wound Width (cm) 0.4 cm 08/19/22 1400   Wound Depth (cm) 0.1 cm 08/19/22 1400   Wound Surface Area (cm^2) 0.16 cm^2 08/19/22 1400   Wound Volume (cm^3) 0.016 cm^3  08/19/22 1400   Post-Procedure Length (cm) 0.9 cm 08/12/22 1300   Post-Procedure Width (cm) 0.5 cm 08/12/22 1300   Post-Procedure Depth (cm) 0.1 cm 08/12/22 1300   Post-Procedure Surface Area (cm^2) 0.45 cm^2 08/12/22 1300   Post-Procedure Volume (cm^3) 0.045 cm^3 08/12/22 1300   Tunneling (cm) 0 cm 12/02/22 1527   Undermining (cm) 0 cm 12/02/22 1527   Wound Odor None 12/30/22 1400   Exposed Structures None 12/30/22 1400   Number of days: 162       Wound 07/29/22 Pressure Injury Leg Posterior;Lateral Left (Active)   Wound Image    12/30/22 1400   Site Assessment Purple;Red 12/30/22 1400   Periwound Assessment Dry;Intact;Dark edges 12/30/22 1400   Margins Attached edges;Undefined edges 12/30/22 1400   Drainage Amount Scant 12/30/22 1400   Drainage Description Serosanguineous 12/30/22 1400   Treatments Cleansed;Provider debridement;Site care 12/30/22 1400   Wound Cleansing Puracyn [place] 12/30/22 1400   Periwound Protectant Skin Protectant Wipes to Periwound;Barrier Paste 12/30/22 1400   Dressing Cleansing/Solutions Not Applicable 12/30/22 1400   Dressing Options Hydrofiber Silver;Mepilex;Tubigrip 12/30/22 1400   Dressing Changed New 12/30/22 1400   Dressing Change/Treatment Frequency Monday, Wednesday, Friday, and As Needed 12/30/22 1400   WOUND NURSE ONLY - Pressure Injury Stage DTPI 12/30/22 1400   Wound Length (cm) 2 cm 12/30/22 1400   Wound Width (cm) 2.5 cm 12/30/22 1400   Wound Depth (cm) 0.1 cm 08/12/22 1300   Wound Surface Area (cm^2) 5 cm^2 12/30/22 1400   Wound Volume (cm^3) 0.001 cm^3 08/12/22 1300   Post-Procedure Length (cm) 1 cm 12/30/22 1400   Post-Procedure Width (cm) 0.5 cm 12/30/22 1400   Post-Procedure Depth (cm) 0.1 cm 12/30/22 1400   Post-Procedure Surface Area (cm^2) 0.5 cm^2 12/30/22 1400   Post-Procedure Volume (cm^3) 0.05 cm^3 12/30/22 1400   Tunneling (cm) 0 cm 12/30/22 1400   Undermining (cm) 0 cm 12/30/22 1400   Wound Odor None 12/30/22 1400   Exposed Structures None 12/30/22 1400   Number  of days: 155       Wound 10/21/22 Pressure Injury Left Posterior Heel stage 3 (Active)   Wound Image    12/30/22 1400   Site Assessment Fragile;Purple;Red 12/30/22 1400   Periwound Assessment Fragile;Scar tissue;Dark edges 12/30/22 1400   Margins Attached edges 12/30/22 1400   Drainage Amount Small 12/30/22 1400   Drainage Description Serosanguineous 12/30/22 1400   Treatments Cleansed;Provider debridement;Site care 12/30/22 1400   Wound Cleansing Puracyn [place] 12/30/22 1400   Periwound Protectant Skin Protectant Wipes to Periwound;Barrier Paste 12/30/22 1400   Dressing Cleansing/Solutions Not Applicable 12/30/22 1400   Dressing Options Hydrofiber Silver;Mepilex Heel;Tubigrip 12/30/22 1400   Dressing Changed New 12/30/22 1400   Dressing Status Clean;Dry;Intact 12/30/22 1400   Dressing Change/Treatment Frequency Monday, Wednesday, Friday, and As Needed 12/30/22 1400   WOUND NURSE ONLY - Pressure Injury Stage 3 12/30/22 1400   Non-staged Wound Description Not applicable 11/18/22 1300   Wound Length (cm) 1 cm 12/30/22 1400   Wound Width (cm) 2 cm 12/30/22 1400   Wound Depth (cm) 0.2 cm 12/30/22 1400   Wound Surface Area (cm^2) 2 cm^2 12/30/22 1400   Wound Volume (cm^3) 0.4 cm^3 12/30/22 1400   Post-Procedure Length (cm) 2 cm 12/30/22 1400   Post-Procedure Width (cm) 2 cm 12/30/22 1400   Post-Procedure Depth (cm) 0.2 cm 12/30/22 1400   Post-Procedure Surface Area (cm^2) 4 cm^2 12/30/22 1400   Post-Procedure Volume (cm^3) 0.8 cm^3 12/30/22 1400   Wound Healing % -2567 12/30/22 1400   Tunneling (cm) 0 cm 12/30/22 1400   Undermining (cm) 0 cm 12/30/22 1400   Wound Odor None 12/30/22 1400   Exposed Structures None 12/30/22 1400   Number of days: 71       Wound 11/11/22 LLE Medial (anterior and posterior merged) (Active)   Wound Image    12/30/22 1400   Site Assessment Red;Boggy;Fragile 12/30/22 1400   Periwound Assessment Fragile;Scar tissue;Red 12/30/22 1400   Margins Unattached edges 12/30/22 1400   Drainage Amount  Moderate 12/30/22 1400   Drainage Description Serosanguineous 12/30/22 1400   Treatments Cleansed;Provider debridement;Site care 12/30/22 1400   Wound Cleansing Puracyn [place] 12/30/22 1400   Periwound Protectant Skin Protectant Wipes to Periwound;Barrier Paste 12/30/22 1400   Dressing Cleansing/Solutions Normal [place] 12/30/22 1400   Dressing Options Hydrofera Blue Classic;Mepilex;Tubigrip 12/30/22 1400   Dressing Changed New 12/30/22 1400   Dressing Change/Treatment Frequency Monday, Wednesday, Friday, and As Needed 12/30/22 1400   Non-staged Wound Description Full thickness 12/30/22 1400   Wound Length (cm) 2.5 cm 12/30/22 1400   Wound Width (cm) 3.5 cm 12/30/22 1400   Wound Depth (cm) 1 cm 12/30/22 1400   Wound Surface Area (cm^2) 8.75 cm^2 12/30/22 1400   Wound Volume (cm^3) 8.75 cm^3 12/30/22 1400   Post-Procedure Length (cm) 2.5 cm 12/30/22 1400   Post-Procedure Width (cm) 3.5 cm 12/30/22 1400   Post-Procedure Depth (cm) 1 cm 12/30/22 1400   Post-Procedure Surface Area (cm^2) 8.75 cm^2 12/30/22 1400   Post-Procedure Volume (cm^3) 8.75 cm^3 12/30/22 1400   Wound Healing % -1202 12/30/22 1400   Tunneling (cm) 0 cm 12/30/22 1400   Tunneling Clock Position of Wound 9 11/11/22 1300   Undermining (cm) 0 cm 12/30/22 1400   Wound Odor None 12/30/22 1400   Exposed Structures Bone 12/30/22 1400   Number of days: 50       PROCEDURE: Excisional debridement of left medial lower leg wound and left heel wound  - topical anesthesia not required, area is insensate  -Curette used to debride wound bed.  Excisional debridement was performed to remove devitalized tissue until healthy, bleeding tissue was visualized.   Entire surface of wounds, 12.75 cm² debrided.  Tissue debrided into the muscle layer.    -Bleeding controlled with manual pressure.    -Wound care completed by wound RN, refer to flowsheet  -Patient tolerated the procedure well, without c/o pain or discomfort.      No debridement of left posterior heel, left anterior  leg, or left posterior/lateral leg    BIOLOGIC LOG  5/20/2022-first application.  PRODUCT: EpiCord 2 x 3 cm . PRODUCT #[id number]; EXPIRES: 2026-12-01 5/27/2022- second application.  PRODUCT: EpiCordEX 2 x 3 cm . PRODUCT #[id number]; EXPIRES: 2026-09-01    6/3/2022- third application.  PRODUCT: EpiCordEX 2 x 3 cm . PRODUCT #[id number]; EXPIRES: 2026-10-01    6/10/2022- fourth application.  PRODUCT: EpiCordEX 2 x 3 cm . PRODUCT #[id number]; EXPIRES: 2026-09-01 6/17/2022- fifth application.  PRODUCT: EpiCordEX 2 x 3 cm . PRODUCT #[id number]; EXPIRES: 2027-02-01 6/24/2022- sixth application.  PRODUCT: EpiCordEX 2 x 3 cm . PRODUCT #[id number]; EXPIRES: 2027-02-01 07/01/22: Seventh application.  Product: Epicort Dx 2.0 x 3.0 cm reorder number [id number]; product number [id number]; expires 2027-02-01 7/22/2022- eighth application.  PRODUCT: EpiCord 2 x 3 cm, reorder #[id number]. PRODUCT #[id number]; EXPIRES: 2027-02-01      Pertinent Labs and Diagnostics:    Labs:     A1c: No results found for: HBA1C     [facility] results, 7/1/2022 (under media tab)    CRP 13    ESR 31      IMAGING:     10/3/2022-CT of pelvis with contrast  IMPRESSION:     1.  Posterior soft tissue sacral wound and 1.5 x 3.7 cm abscess.   2.  Progressive destruction of the distal sacrum and proximal coccyx. Sclerosis and irregularity of the distal sacrum consistent with osteomyelitis.   3.  Old wound within the soft tissues posterior to the distal left SI joint with possible fistulous communication.    10/3/2022-CT of tib-fib with and without contrast, with reconstruction  IMPRESSION:     1.  Old fractures of the left tibia and fibula with extensive callus formation and bony bridging as well as extensive dystrophic calcifications. Similar to previous.   2.  Distal medial soft tissue wounds with ill-defined superficial in the soft tissue thickening and fluid collections suggestive of  phlegmon. No organized abscess identified.   3.  No definite osteomyelitis.   4.  Arthritic changes.        VASCULAR STUDIES: No results found.    LAST  WOUND CULTURE:   Lab Results   Component Value Date/Time    CULTRSULT Usual urogenital ade 10-50,000 cfu/mL 11/17/2022 03:30 PM          ASSESSMENT AND PLAN:     1. Sacral decubitus ulcer, stage IV ([facility])  Comments: Ulcer first noted in early April 2022 as small open area which quickly enlarged.  This ulcer is present distal from previous sacral ulcer which healed after surgery in January.  Patient has history of flap reconstruction x2 to this area.  CT shows progressive destruction of distal sacrum and proximal coccyx.    12/30/2022: Wound area and undermining slightly less today.  -Excisional debridement of wound in clinic today, medically necessary to promote wound healing  -Patient is to return to clinic weekly for assessment and debridement   - As wound VAC was malfunctioning and not managing drainage, will hold VAC this week and pack wound with hydrofiber.  - Case previously discussed with Dr. Saini, we discussed options for possible DEE DEE admission. He will re-evaluate patient following the holidays. At this point patient has all offloading surfaces, through he does spend too much time in his wheelchair which is limiting his recovery. Patient encouraged to follow up with Dr. Saini.    Wound care: Hydrofiber silver strip, hydrofiber silver, Mepilex    2. Postoperative wound dehiscence, subsequent encounter  Comments: On 4/26 patient underwent irrigation and debridement of multiple compartments of the left lower extremity states for pressure injury, with bone excision, and complex closure of chronic wound using biologic skin substitute.  During postop visit in surgeons office on 5/11, surgical site was noted to be dehisced.  Patient was referred to wound clinic for management.    12/30/2022: Wound tissue remains poor, friable and boggy.  -Excisional  debridement of wound in clinic today, medically necessary to promote wound healing.  -Patient to return to clinic weekly for assessment and debridement as needed  -Home health to change dressing 1-2 times per week in between clinic visits   -Patient to follow-up with Dr. Raman next week  -Consider resuming biologic and/or VAC, do not think that tissue quality is sufficient for biologic.  -I suspect pressure may be an issue still.  Patient states he is wearing Prevalon boot at all times    Wound care: Hydrofera Blue, Mepilex    3. Deep tissue injury  4. Pressure injury of left leg, unstageable ([facility])  Comments: DTI to posterior left lower leg first observed in clinic 7/29/2022.  Patient does not know how it started, had been wearing heel float boot consistently.    12/30/2022: Maintenance stable.  No need for debridement today  -Continue to monitor for any deterioration in tissue quality  -Patient is currently wearing Prevalon boots to help prevent pressure injuries to bilateral lower extremities    Wound care: Mepilex, Tubigrip D    5. Pressure injury of left heel, stage 3 ([facility])  Comments: First noted in clinic on 10/21/2022, originally noted to be stage II    12/30/2022: Wounds have reopened. Are superficial  - Excisional debridement was performed in clinic, medically necessary to promote wound healing.  - Patient reports wearing Prevalon boots 24 hours a day. He is clear having pressure to heel and recommend offloading heels off mattress.       Wound Care: Hydrofiber silver, Heel Mepilex to reduce pressure and friction    6. Quadriplegia, C5-C7, complete ([facility])  Comments: Complicating factor.  Impaired mobility and sensation  -Patient is still spending 7-8 hours/day up in his wheelchair, though he does have appropriate offloading cushion, and knows to reposition frequently.  Wears heel float boots bilaterally at all times      Please note that this note may have been created using voice recognition software. I  have worked with technical experts from [facility] to optimize the interface.  I have made every reasonable attempt to correct obvious errors, but there may be errors of grammar and possibly content that I did not discover before finalizing the note.

## 2022-12-31 NOTE — PROGRESS NOTES
Updated Home Health wound care orders faxed to [facility] [phone]. NPWT hold initiated today per Dr. Hodges.

## 2022-12-31 NOTE — PATIENT INSTRUCTIONS
-Keep your wound dressing clean, dry, and intact.    -Should you experience any significant changes in your wound(s), such as infection (redness, swelling, localized heat, increased pain, fever > 101 F, chills) or have any questions regarding your home care instructions, please contact the wound center at [phone]. If after hours, contact your primary care physician or go to the hospital emergency room.

## 2023-01-02 ENCOUNTER — HOSPITAL ENCOUNTER (EMERGENCY)
Facility: MEDICAL CENTER | Age: 73
End: 2023-01-02
Attending: EMERGENCY MEDICINE
Payer: MEDICARE

## 2023-01-02 VITALS
BODY MASS INDEX: 31.81 KG/M2 | DIASTOLIC BLOOD PRESSURE: 71 MMHG | TEMPERATURE: 98 F | HEIGHT: 70 IN | SYSTOLIC BLOOD PRESSURE: 136 MMHG | WEIGHT: 222.22 LBS | HEART RATE: 55 BPM | RESPIRATION RATE: 16 BRPM | OXYGEN SATURATION: 95 %

## 2023-01-02 DIAGNOSIS — T83.010A SUPRAPUBIC CATHETER DYSFUNCTION, INITIAL ENCOUNTER (HCC): ICD-10-CM

## 2023-01-02 DIAGNOSIS — R31.0 GROSS HEMATURIA: ICD-10-CM

## 2023-01-02 PROCEDURE — 99284 EMERGENCY DEPT VISIT MOD MDM: CPT

## 2023-01-02 PROCEDURE — 51705 CHANGE OF BLADDER TUBE: CPT

## 2023-01-02 RX ORDER — POLYETHYLENE GLYCOL 3350 17 G/17G
17 POWDER, FOR SOLUTION ORAL DAILY
Status: SHIPPED | COMMUNITY
End: 2023-01-07

## 2023-01-02 ASSESSMENT — FIBROSIS 4 INDEX: FIB4 SCORE: 2.96

## 2023-01-02 ASSESSMENT — LIFESTYLE VARIABLES: DO YOU DRINK ALCOHOL: NO

## 2023-01-02 NOTE — ED PROVIDER NOTES
ED Provider Note        CHIEF COMPLAINT  Chief Complaint   Patient presents with    Urinary Catheter Problem     Supra pubic cath not draining this am       EXTERNAL RECORDS REVIEWED  Patient with history of quadriplegia and neurogenic bladder with suprapubic catheter in place.  Last ER visit December 10 for clogged catheter which was replaced.  Patient has history of atrial fibrillation with watchman device.  He is currently taking Xarelto and baby aspirin until upcoming RICHARD the middle of this month.    HPI  LIMITATION TO HISTORY   Select: : None  OUTSIDE HISTORIAN(S):  Select: None    Osvaldo Pate is a 72 y.o. male who presents to the emergency department with a urinary catheter problem.  The patient has a suprapubic catheter in place which she has had for a long time due to neurogenic bladder from quadriplegia.  He states that his catheter has stopped draining starting this morning.  He has noticed some blood in the catheter as well.  He states that this is not atypical as he is currently taking both Xarelto and aspirin for atrial fibrillation.  Catheter frequently clogs and needs replaced.  It was replaced by the urology clinic may be 2 weeks ago.  He attempted flushing it this morning without improvement.  He denies any significant abdominal discomfort, flank pain, fevers, vomiting, or other concerns    REVIEW OF SYSTEMS  See HPI for further details. All other systems are negative.     PAST MEDICAL HISTORY   has a past medical history of A-fib ([facility]), Acute cystitis without hematuria (10/23/2021), Atrial flutter ([facility]), Blood clotting disorder ([facility]), Bowel habit changes, GERD (gastroesophageal reflux disease), Hypertension, Kidney stones, Neurogenic bladder, Open wound (11/18/2022), Quadriplegia, C5-C7 complete ([facility]), and Suprapubic catheter ([facility]).    SURGICAL HISTORY   has a past surgical history that includes percutaneouospinning lower extremity; colostomy; colostomy takedown; colostomy (N/A,  2019); ulcer debridement (N/A, 2019); flap closure (2019); hernia repair; irrigation & debridement general (2020); cystoscopy,insert ureteral stent (Left, 2021); cysto/uretero/pyeloscopy, dx (Left, 2021); lasertripsy (Left, 2021); cystoscopy,insert ureteral stent (Left, 2022); cysto/uretero/pyeloscopy, dx (Left, 2022); lasertripsy (N/A, 2022); incision and drainage orthopedic (2022); incision and drainage orthopedic (Left, 2022); bone biopsy (Left, 2022); irrigation & debridement general (Left, 2022); wound closure neuro (Left, 2022); and orthopedic osteotomy (Left, 2022).    FAMILY HISTORY  Family History   Problem Relation Age of Onset    Heart Disease Father        SOCIAL HISTORY  Social History     Tobacco Use    Smoking status: Former     Packs/day: 1.00     Years: 10.00     Pack years: 10.00     Types: Cigarettes     Start date: 1967     Quit date: 1977     Years since quittin.0    Smokeless tobacco: Never   Vaping Use    Vaping Use: Never used   Substance and Sexual Activity    Alcohol use: Yes     Alcohol/week: 0.6 oz     Types: 1 Standard drinks or equivalent per week     Comment: Nightly    Drug use: No    Sexual activity: Not on file       CURRENT MEDICATIONS  Home Medications       Reviewed by Humberto Magaña (Pharmacy Tech) on 23 at 1356  Med List Status: Complete     Medication Last Dose Status   amLODIPine (NORVASC) 10 MG Tab 2023 Active   Ascorbic Acid (VITAMIN C) 1000 MG Tab 2023 Active   aspirin EC 81 MG EC tablet 2023 Active   baclofen (LIORESAL) 20 MG tablet 2023 Active   Cholecalciferol (VITAMIN D3) 2000 UNIT Cap 2023 Active   docusate sodium (COLACE) 100 MG Cap 2023 Active   ferrous sulfate 325 (65 Fe) MG tablet 2023 Active   losartan (COZAAR) 50 MG Tab unk Active   Magnesium 400 MG Tab 2023 Active   melatonin 5 mg Tab 2023 Active   polyethylene  "glycol/lytes (MIRALAX) 17 g Pack 1/2/2023 Active   Probiotic Product (PROBIOTIC PO) 1/2/2023 Active   rivaroxaban (XARELTO) 20 MG Tab tablet 1/1/2023 Active   sennosides (SENOKOT) 8.6 MG Tab 1/2/2023 Active   Zinc 50 MG Tab 1/2/2023 Active                    ALLERGIES  Allergies   Allergen Reactions    Sulfa Drugs Rash     Rash, developed this back in 2015 after being placed on \"sulfa antibiotic for my wound\". Antibiotic was stopped and rash went away. Patient states he had a sulfa antibiotic prior to that time back when he was younger w/o a reaction.          PHYSICAL EXAM  VITAL SIGNS: /71   Pulse (!) 55   Temp 36.7 °C (98 °F) (Temporal)   Resp 16   Ht 1.778 m (5' 10\")   Wt 101 kg (222 lb 3.6 oz)   SpO2 95%   BMI 31.89 kg/m²    Constitutional: Nontoxic appearing. Alert in no apparent distress.  HENT: Normocephalic, Atraumatic. Bilateral external ears normal. Nose normal.  Moist mucous membranes.  Oropharynx clear.  Eyes: Pupils are equal and reactive. Conjunctiva normal.   Neck: Supple, full range of motion  Heart: Regular rate and rhythm.  No murmurs.    Lungs: No respiratory distress, normal work of breathing. Lungs clear to auscultation bilaterally.  Abdomen Soft, no distention.  No tenderness to palpation. Ostomy in place in LLQ.  Suprapubic catheter in place with bloody urine, no clots.  Musculoskeletal: Atraumatic. No obvious deformities noted.  No lower extremity edema.  Skin: Warm, Dry.  No erythema, No rash.   Neurologic: Alert and oriented x3. Moving all extremities spontaneously without focal deficits.  Psychiatric: Affect normal, Mood normal, Appears appropriate and not intoxicated.      DIAGNOSTIC STUDIES / PROCEDURES          COURSE & MEDICAL DECISION MAKING  Pertinent Labs & Imaging studies reviewed. (See chart for details)    ED Observation Status? No; Patient does not meet criteria for ED Observation.     INITIAL ASSESSMENT AND PLAN  Patient on anticoagulation with suprapubic catheter " in place who presents with clogged catheter and some blood in urine.  He has normal vitals on arrival and no other complaints.  Abdominal exam is benign and no symptoms concerning for infectious process. Bleeding is mild without large passage of clots.  Catheter was replaced with improved flow.  It was flushed now with passage of yellow urine.  Will discharge home with established urology follow up.    Escalation of care considered, and ultimately not performed: IV fluids, blood analysis, and diagnostic imaging.    Diagnostic tests and prescription drugs considered including, but not limited to: Select: Antibiotics however no signs of infection .     11:23 PM - Upon reassessment, patient is resting comfortably with normal vital signs.  No new complaints at this time.  Discussed results with patient and/or family as well as importance of primary care/urology follow up.  Patient understands plan of care and strict return precautions for new or changing symptoms.       DISPOSITION:  Patient will be discharged home in stable condition.    FOLLOW UP:  [facility]  [address]  [place] [address]  [phone]    Schedule an appointment as soon as possible for a visit       [facility], Emergency Dept  [address]  [place] [address]  [phone]    If symptoms worsen      OUTPATIENT MEDICATIONS:  Discharge Medication List as of 1/2/2023  5:26 PM            FINAL IMPRESSION  1. Suprapubic catheter dysfunction, initial encounter (HCC)    2. Gross hematuria           Electronically signed by: Lela Guallpa M.D., 1/2/2023 1:37 PM

## 2023-01-02 NOTE — DISCHARGE PLANNING
Anticipated Discharge Disposition: Return to Group home    Action: Need to return to [facility], He is a quad who requires a brigid lift and has poor sitting balance per caregiver at [facility]  . [facility] PCS sent.  Spoke with Lisa at [facility] ETA 17:00 for return to group home    Barriers to Discharge:  [facility] ETA 17:00    Plan: Will cont to follow

## 2023-01-02 NOTE — DISCHARGE INSTRUCTIONS
You were seen in the Emergency Department for catheter dysfunction.    Please use 1,000mg of tylenol every 6 hours as needed for pain.  Try to flush your catheter frequently while you are having bleeding as it is easy to become clogged.    Please follow up with your primary care physician and urologist.      Return to the Emergency Department with worsening bleeding, recurrent blockage, fevers, vomiting, abdominal or back pain, or other concerns.

## 2023-01-02 NOTE — ED TRIAGE NOTES
Pt MARISA erickson from group home- [facility]  Pt resting on gurney, pt in no acute distress, pt provided call light, instructed to call if needing any assistance, instructed not to get up by self, gurney in lowest position.   Catheter bag no no fluid  Abd firm and distended

## 2023-01-03 ENCOUNTER — TELEPHONE (OUTPATIENT)
Dept: WOUND CARE | Facility: MEDICAL CENTER | Age: 73
End: 2023-01-03
Payer: MEDICARE

## 2023-01-03 NOTE — ED NOTES
Pt discharged to [facility] for transport back to [facility] home, reviewed all discharge instructions including medications and follow up, pt verbalizes understanding, and denies questions.  Escorted to lobby.

## 2023-01-05 NOTE — TELEPHONE ENCOUNTER
Recvd call from patient requesting to reschedule this procedure. Patient now scheduled for post watchman RICHARD w/anesthesia on 1-24-23 with Dr. Anaya. Patient has been instructed to check in at 9:00 for 11:00 case time. Message sent to domingo NAZARIO to Dr. Anaya.

## 2023-01-06 ENCOUNTER — APPOINTMENT (OUTPATIENT)
Dept: WOUND CARE | Facility: MEDICAL CENTER | Age: 73
End: 2023-01-06
Attending: INTERNAL MEDICINE
Payer: MEDICARE

## 2023-01-07 ENCOUNTER — HOSPITAL ENCOUNTER (EMERGENCY)
Facility: MEDICAL CENTER | Age: 73
End: 2023-01-07
Attending: EMERGENCY MEDICINE
Payer: MEDICARE

## 2023-01-07 VITALS
WEIGHT: 221.98 LBS | TEMPERATURE: 98.1 F | SYSTOLIC BLOOD PRESSURE: 131 MMHG | RESPIRATION RATE: 18 BRPM | HEART RATE: 71 BPM | OXYGEN SATURATION: 95 % | DIASTOLIC BLOOD PRESSURE: 68 MMHG | BODY MASS INDEX: 31.85 KG/M2

## 2023-01-07 DIAGNOSIS — T83.9XXA PROBLEM WITH FOLEY CATHETER, INITIAL ENCOUNTER (HCC): ICD-10-CM

## 2023-01-07 DIAGNOSIS — N39.0 URINARY TRACT INFECTION WITH HEMATURIA, SITE UNSPECIFIED: ICD-10-CM

## 2023-01-07 DIAGNOSIS — R31.9 URINARY TRACT INFECTION WITH HEMATURIA, SITE UNSPECIFIED: ICD-10-CM

## 2023-01-07 LAB
APPEARANCE UR: ABNORMAL
BACTERIA #/AREA URNS HPF: ABNORMAL /HPF
BILIRUB UR QL STRIP.AUTO: NEGATIVE
COLOR UR: ABNORMAL
GLUCOSE UR STRIP.AUTO-MCNC: NEGATIVE MG/DL
KETONES UR STRIP.AUTO-MCNC: NEGATIVE MG/DL
LEUKOCYTE ESTERASE UR QL STRIP.AUTO: ABNORMAL
MICRO URNS: ABNORMAL
NITRITE UR QL STRIP.AUTO: POSITIVE
PH UR STRIP.AUTO: 8.5 [PH] (ref 5–8)
PROT UR QL STRIP: 100 MG/DL
RBC # URNS HPF: ABNORMAL /HPF
RBC UR QL AUTO: ABNORMAL
SP GR UR STRIP.AUTO: 1.01
TRIPLE PHOS CRYSTAL  1767: ABNORMAL /HPF
WBC #/AREA URNS HPF: ABNORMAL /HPF

## 2023-01-07 PROCEDURE — 51702 INSERT TEMP BLADDER CATH: CPT

## 2023-01-07 PROCEDURE — 87106 FUNGI IDENTIFICATION YEAST: CPT

## 2023-01-07 PROCEDURE — 303105 HCHG CATHETER EXTRA

## 2023-01-07 PROCEDURE — 99284 EMERGENCY DEPT VISIT MOD MDM: CPT

## 2023-01-07 PROCEDURE — 87086 URINE CULTURE/COLONY COUNT: CPT

## 2023-01-07 PROCEDURE — 87186 SC STD MICRODIL/AGAR DIL: CPT

## 2023-01-07 PROCEDURE — 81001 URINALYSIS AUTO W/SCOPE: CPT

## 2023-01-07 PROCEDURE — 87077 CULTURE AEROBIC IDENTIFY: CPT

## 2023-01-07 PROCEDURE — 700102 HCHG RX REV CODE 250 W/ 637 OVERRIDE(OP): Performed by: EMERGENCY MEDICINE

## 2023-01-07 PROCEDURE — 87205 SMEAR GRAM STAIN: CPT

## 2023-01-07 PROCEDURE — A9270 NON-COVERED ITEM OR SERVICE: HCPCS | Performed by: EMERGENCY MEDICINE

## 2023-01-07 RX ORDER — ELECTROLYTES/DEXTROSE
1 SOLUTION, ORAL ORAL DAILY
Status: SHIPPED | COMMUNITY
End: 2023-01-23

## 2023-01-07 RX ORDER — AMOXICILLIN AND CLAVULANATE POTASSIUM 875; 125 MG/1; MG/1
1 TABLET, FILM COATED ORAL ONCE
Status: COMPLETED | OUTPATIENT
Start: 2023-01-07 | End: 2023-01-07

## 2023-01-07 RX ORDER — BACLOFEN 20 MG/1
20 TABLET ORAL 4 TIMES DAILY
Status: SHIPPED | COMMUNITY
End: 2023-01-18 | Stop reason: SDUPTHER

## 2023-01-07 RX ORDER — AMOXICILLIN AND CLAVULANATE POTASSIUM 875; 125 MG/1; MG/1
1 TABLET, FILM COATED ORAL 2 TIMES DAILY
Qty: 20 TABLET | Refills: 0 | Status: SHIPPED | OUTPATIENT
Start: 2023-01-07 | End: 2023-01-17

## 2023-01-07 RX ADMIN — AMOXICILLIN AND CLAVULANATE POTASSIUM 1 TABLET: 875; 125 TABLET, FILM COATED ORAL at 12:45

## 2023-01-07 ASSESSMENT — FIBROSIS 4 INDEX: FIB4 SCORE: 2.96

## 2023-01-07 NOTE — ED NOTES
Med Rec Complete per patient  Allergies Reviewed with patient  No antibiotics within the last 30 days  Patient's Preferred Pharmacy:  [facility]        Patient takes Xarelto 20mg daily.

## 2023-01-07 NOTE — DISCHARGE PLANNING
note:  Pt requested a copy of his Augmentin prescription to be given to his groups[facility]quillee caregiver. [facility]

## 2023-01-07 NOTE — ED TRIAGE NOTES
Chief Complaint   Patient presents with    Urinary Catheter Problem      Pt has chronic suprapubic urinary catheter, replaced last Monday. Currently clogged, attempted to flush it at home with no success.

## 2023-01-07 NOTE — ED PROVIDER NOTES
ED Provider Note    Primary care provider: KATE Pretty  Means of arrival: private vehicle  History obtained from: patient  History limited by: none    CHIEF COMPLAINT  Chief Complaint   Patient presents with    Urinary Catheter Problem       HPI/ROS  Osvaldo Pate is a 72 y.o. male who presents to the Emergency Department for evaluation of clogged suprapubic Cazares catheter.  Patient is wheelchair-bound with neurogenic bladder and has a suprapubic catheter.  He has had this for 2 years.  He reports that is exchanged last week after it became clogged and he has been flushing every day like he normally does until this morning when he was unable to flush it.  He notes that there has been increased sediment in the urine when he flushes it and he is concerned about possible infection.  Denies fevers, chills.  Given his quadriplegia cannot determine whether or not he has lower abdominal pain.    EXTERNAL RECORDS REVIEWED  Patient seen in the emergency department earlier this week for clogged Cazares catheter.  Review of prior urine cultures demonstrates patient last grew out Klebsiella that was pansensitive in late September 2022.  Inpatient records reviewed demonstrate patient did have prior Proteus infection that was ESBL in early September.  However has not had this recurrently since then.    LIMITATION TO HISTORY   None    OUTSIDE HISTORIAN(S):  None      PAST MEDICAL HISTORY   has a past medical history of A-fib ([facility]), Acute cystitis without hematuria (10/23/2021), Atrial flutter ([facility]), Blood clotting disorder ([facility]), Bowel habit changes, GERD (gastroesophageal reflux disease), Hypertension, Kidney stones, Neurogenic bladder, Open wound (11/18/2022), Quadriplegia, C5-C7 complete ([facility]), and Suprapubic catheter ([facility]).    SURGICAL HISTORY   has a past surgical history that includes percutaneouospinning lower extremity; colostomy; colostomy takedown; colostomy (N/A, 7/27/2019); ulcer debridement (N/A,  "2019); flap closure (2019); hernia repair; irrigation & debridement general (2020); cystoscopy,insert ureteral stent (Left, 2021); cysto/uretero/pyeloscopy, dx (Left, 2021); lasertripsy (Left, 2021); cystoscopy,insert ureteral stent (Left, 2022); cysto/uretero/pyeloscopy, dx (Left, 2022); lasertripsy (N/A, 2022); incision and drainage orthopedic (2022); incision and drainage orthopedic (Left, 2022); bone biopsy (Left, 2022); irrigation & debridement general (Left, 2022); wound closure neuro (Left, 2022); and orthopedic osteotomy (Left, 2022).    SOCIAL HISTORY  Social History     Tobacco Use    Smoking status: Former     Packs/day: 1.00     Years: 10.00     Pack years: 10.00     Types: Cigarettes     Start date: 1967     Quit date: 1977     Years since quittin.0    Smokeless tobacco: Never   Vaping Use    Vaping Use: Never used   Substance Use Topics    Alcohol use: Yes     Alcohol/week: 0.6 oz     Types: 1 Standard drinks or equivalent per week     Comment: Nightly    Drug use: No      Social History     Substance and Sexual Activity   Drug Use No       FAMILY HISTORY  Family History   Problem Relation Age of Onset    Heart Disease Father        CURRENT MEDICATIONS  See medication list      ALLERGIES  Allergies   Allergen Reactions    Sulfa Drugs Rash     Rash, developed this back in  after being placed on \"sulfa antibiotic for my wound\". Antibiotic was stopped and rash went away. Patient states he had a sulfa antibiotic prior to that time back when he was younger w/o a reaction.          PHYSICAL EXAM  VITAL SIGNS: /54   Pulse 60   Temp 36.7 °C (98 °F) (Temporal)   Resp 16   SpO2 94%   Vitals reviewed by myself.  Physical Exam  Nursing note and vitals reviewed.  Constitutional: Well-developed, wheelchair-bound  HENT: Head is normocephalic and atraumatic.  Eyes: extra-ocular movements intact  Cardiovascular: " Regular rate and  regular rhythm. No murmur heard.  Pulmonary/Chest: Breath sounds normal. No wheezes or rales.   Abdominal: Soft, colostomy bag with good output.  Suprapubic catheter is in place, not draining, no erythema surrounding suprapubic catheter site  Musculoskeletal: Extremities exhibit normal range of motion without edema or tenderness.   Neurological: Awake and alert  Skin: Skin is warm and dry. No rash.         DIAGNOSTIC STUDIES:  LABS  Labs Reviewed   URINALYSIS,CULTURE IF INDICATED - Abnormal; Notable for the following components:       Result Value    Color Orange (*)     Character Hazy (*)     Ph 8.5 (*)     Protein 100 (*)     Nitrite Positive (*)     Leukocyte Esterase Large (*)     Occult Blood Large (*)     All other components within normal limits    Narrative:     Indication for culture:->Unexplained new onset of Flank pain  and/or Costovertebral angle tenderness   URINE MICROSCOPIC (W/UA) - Abnormal; Notable for the following components:    WBC 10-20 (*)     RBC  (*)     Bacteria Many (*)     All other components within normal limits    Narrative:     Indication for culture:->Unexplained new onset of Flank pain  and/or Costovertebral angle tenderness   URINE CULTURE(NEW)    Narrative:     Indication for culture:->Unexplained new onset of Flank pain  and/or Costovertebral angle tenderness   URINE CULTURE(NEW)    Narrative:     Indication for culture:->Unexplained new onset of Flank pain  and/or Costovertebral angle tenderness       All labs reviewed and independently interpreted by myself        COURSE & MEDICAL DECISION MAKING    ED Observation Status? No; Patient does not meet criteria for ED Observation.     INITIAL ASSESSMENT AND PLAN    Patient is a 72-year-old male who comes in for evaluation of clogged Cazares catheter.  Differential diagnosis includes urinary tract infection, blood clots, clogged catheter.  Given his history of urine infections will obtain a urinalysis after  exchanging his Cazares catheter to assess for possible infection.  Patient is amenable to this plan.       REASSESSMENTS   Cazares catheter is replaced by nursing staff and draining appropriately    ER COURSE AND FINAL DISPOSITION   Patient's initial vitals are within normal limits.  He is well-appearing on exam.  Urinalysis returns and independently interpreted by myself to be consistent with infection, will be send for culture.  Given his prior history of Klebsiella and Proteus I discussed antibiotic management with pharmacy team.  As his last culture was Klebsiella that was pansensitive we elected to start him on Augmentin, pharmacy team will follow up cultures.  He is provided with prescription for Augmentin.  I discussed this plan of care with patient and he is amenable to this plan.  Patient is then given strict return precautions and discharged in stable condition.    ADDITIONAL PROBLEM LIST AND RESOURCE UTILIZATION    Additional problems aside from the chief complaint that I have addressed: none    I have discussed management of the patient with the following physicians and MARCO's: None    Discussion of management with other QHP or appropriate source(s): Pharmacist    Escalation of care considered, and ultimately not performed: blood analysis.     Barriers to care at this time, including but not limited to: none.     Decision tools and prescription drugs considered including, but not limited to: n/a.    Please see review of records as noted above      FINAL IMPRESSION  1. Problem with Cazares catheter, initial encounter (HCC)    2. Urinary tract infection with hematuria, site unspecified

## 2023-01-07 NOTE — LETTER
1/11/2023               Osvaldo Pate  [address] [place] [id number]        Dear Osvaldo (MR#[id number])    This letter is sent in regards to your recent visit to the [facility] Emergency Department on 1/7/2023. During the visit, tests were performed to assist the physician in your medical diagnosis. A review of your tests requires that we notify you of the following:    Your urine culture was POSITIVE for bacteria called Proteus Mirabilis. The antibiotic prescribed for you (Augmentin) should be active to treat this bacteria. It is important that you continue taking your antibiotic until it is finished.     Please feel free to contact me at the number below if you have any questions or concerns. Thank you for your cooperation in the matter.    Sincerely,  ED Culture Follow-Up Staff  Norma Gar, PharmD    [facility], Emergency Department  [address] [place] [id number] [phone] (ED Culture Line)

## 2023-01-08 LAB
GRAM STN SPEC: NORMAL
SIGNIFICANT IND 70042: NORMAL
SITE SITE: NORMAL
SOURCE SOURCE: NORMAL

## 2023-01-11 ENCOUNTER — HOSPITAL ENCOUNTER (OUTPATIENT)
Facility: MEDICAL CENTER | Age: 73
End: 2023-01-11
Attending: INTERNAL MEDICINE | Admitting: INTERNAL MEDICINE
Payer: MEDICARE

## 2023-01-11 NOTE — ED NOTES
ED Positive Culture Follow-up/Notification Note:    Date: 1/11/23     Patient seen in the ED on 1/7/2023 for being unable to flush his chronic hutchins. Patient was not experiencing any systemic symptoms of infection. A urine sample was obtained and he was discharged on antibiotics for a urinary tract infection.   1. Problem with Hutchins catheter, initial encounter (HCC)    2. Urinary tract infection with hematuria, site unspecified       Discharge Medication List as of 1/7/2023 12:42 PM        START taking these medications    Details   amoxicillin-clavulanate (AUGMENTIN) 875-125 MG Tab Take 1 Tablet by mouth 2 times a day for 10 days., Disp-20 Tablet, R-0, Normal           Allergies: Sulfa drugs     Vitals:    01/07/23 1206 01/07/23 1209 01/07/23 1229 01/07/23 1239   BP:  (!) 151/75 (!) 148/79 131/68   Pulse:  64 62 71   Resp:  18 17 18   Temp:   36.7 °C (98.1 °F)    TempSrc:   Temporal    SpO2: 95% 95% 95% 95%   Weight:   101 kg (221 lb 15.7 oz)      Final cultures:   Results       Procedure Component Value Units Date/Time    URINE CULTURE(NEW) [[id number]]  (Abnormal)  (Susceptibility) Collected: 01/07/23 1154    Order Status: Completed Specimen: Other from Urine, Suprapubic Updated: 01/11/23 1403     Significant Indicator POS     Source URSP     Site URINE, SUPRAPUBIC     Culture Result -     Gram Stain Result Moderate WBCs.  Many Gram negative rods.       Culture Result Proteus mirabilis  46,000 cfu/mL        Candida tropicalis  6,000 cfu/mL        Enterococcus faecalis  100 cfu/mL      Narrative:      Indication for culture:->Unexplained new onset of Flank pain  and/or Costovertebral angle tenderness  Indication for culture:->Unexplained new onset of Flank pain    Susceptibility       Proteus mirabilis (1)       Antibiotic Interpretation Microscan   Method Status    Amikacin  [*]  Sensitive <=16 mcg/mL ROSE Final    Ampicillin Resistant >16 mcg/mL ROSE Preliminary    Amoxicillin/CA  [*]  Sensitive <=8/4 mcg/mL ROSE  Final    Aztreonam  [*]  Sensitive <=4 mcg/mL ROSE Final    Ceftolozane+Tazobactam  [*]  Sensitive <=2 mcg/mL ROSE Final    Ceftriaxone Intermediate 2 mcg/mL ROSE Preliminary    Ceftazidime  [*]  Sensitive <=1 mcg/mL ROSE Final    Cefazolin Resistant >16 mcg/mL ROSE Preliminary     Breakpoints when Cefazolin is used for therapy of infections  other than uncomplicated UTIs due to Enterobacterales are as  follows:  ROSE and Interpretation:  <=2 S  4 I  >=8 R         Ciprofloxacin Resistant 2 mcg/mL ROSE Preliminary    Cefepime Sensitive <=2 mcg/mL ROSE Preliminary    Ampicillin/sulbactam Sensitive <=4/2 mcg/mL ROSE Preliminary    Cefuroxime Resistant >16 mcg/mL ROSE Preliminary    Tobramycin Sensitive <=2 mcg/mL ROSE Preliminary    Ertapenem Sensitive <=0.5 mcg/mL ROSE Preliminary    Nitrofurantoin Resistant >64 mcg/mL ROSE Preliminary    Gentamicin Sensitive <=2 mcg/mL ROSE Preliminary    Levofloxacin  [*]  Intermediate 1 mcg/mL ROSE Final    Meropenem  [*]  Sensitive <=1 mcg/mL ROSE Final    Meropenem/Vaborbactam  [*]  Sensitive <=2 mcg/mL ROSE Final    Minocycline Resistant >8 mcg/mL ROSE Preliminary    Pip/Tazobactam Sensitive <=8 mcg/mL ROSE Preliminary    Trimeth/Sulfa Resistant >2/38 mcg/mL ROSE Preliminary    Tetracycline  [*]  Resistant >8 mcg/mL ROSE Final               [*]  Suppressed Antibiotic                   GRAM STAIN [[id number]] Collected: 01/07/23 1154    Order Status: Completed Specimen: Other Updated: 01/08/23 1708     Significant Indicator .     Source URSP     Site URINE, SUPRAPUBIC     Gram Stain Result Moderate WBCs.  Many Gram negative rods.      Narrative:      Indication for culture:->Unexplained new onset of Flank pain  and/or Costovertebral angle tenderness  Indication for culture:->Unexplained new onset of Flank pain    URINALYSIS,CULTURE IF INDICATED [[id number]]  (Abnormal) Collected: 01/07/23 1154    Order Status: Completed Specimen: Urine, Clean Catch Updated: 01/07/23 1210     Color Orange     Character  Hazy     Specific Gravity 1.010     Ph 8.5     Glucose Negative mg/dL      Ketones Negative mg/dL      Protein 100 mg/dL      Bilirubin Negative     Nitrite Positive     Leukocyte Esterase Large     Occult Blood Large     Micro Urine Req Microscopic    Narrative:      Indication for culture:->Unexplained new onset of Flank pain  and/or Costovertebral angle tenderness    URINE CULTURE(NEW) [[id number]] Collected: 01/07/23 0000    Order Status: Canceled Specimen: Urine           Plan:   Urine culture was positive for Proteus mirabilis and Candida tropicalis. Patient is a chronic hutchins patient with neurogenic bladder who presented without systemic signs of infection. Candida species is likely a contaminant and does not require treatment at this time. Appropriate antibiotic therapy (Augmentin) prescribed for Proteus mirabilis. No changes required based upon culture results.    Sent letter to patient to notify of positive culture result and encourage compliance with prescribed antibiotics.     Norma Gar, PharmD

## 2023-01-13 ENCOUNTER — OFFICE VISIT (OUTPATIENT)
Dept: WOUND CARE | Facility: MEDICAL CENTER | Age: 73
End: 2023-01-13
Attending: INTERNAL MEDICINE
Payer: MEDICARE

## 2023-01-13 VITALS
TEMPERATURE: 98.1 F | SYSTOLIC BLOOD PRESSURE: 91 MMHG | DIASTOLIC BLOOD PRESSURE: 54 MMHG | HEART RATE: 60 BPM | OXYGEN SATURATION: 96 % | RESPIRATION RATE: 16 BRPM

## 2023-01-13 DIAGNOSIS — L89.154 SACRAL DECUBITUS ULCER, STAGE IV (HCC): ICD-10-CM

## 2023-01-13 DIAGNOSIS — L89.890 PRESSURE INJURY OF LEFT LEG, UNSTAGEABLE (HCC): ICD-10-CM

## 2023-01-13 DIAGNOSIS — L89.623 PRESSURE INJURY OF LEFT HEEL, STAGE 3 (HCC): ICD-10-CM

## 2023-01-13 LAB
BACTERIA UR CULT: ABNORMAL
GRAM STN SPEC: ABNORMAL
SIGNIFICANT IND 70042: ABNORMAL
SITE SITE: ABNORMAL
SOURCE SOURCE: ABNORMAL

## 2023-01-13 PROCEDURE — 99213 OFFICE O/P EST LOW 20 MIN: CPT | Performed by: STUDENT IN AN ORGANIZED HEALTH CARE EDUCATION/TRAINING PROGRAM

## 2023-01-13 PROCEDURE — 99213 OFFICE O/P EST LOW 20 MIN: CPT

## 2023-01-13 RX ORDER — MAGNESIUM HYDROXIDE 1200 MG/15ML
250 LIQUID ORAL DAILY
COMMUNITY
End: 2023-01-23

## 2023-01-13 NOTE — PATIENT INSTRUCTIONS
-Keep your wound dressing clean, dry, and intact.    -Change your dressing if it becomes soiled, soaked, or falls off.    -Should you experience any significant changes in your wound(s), such as infection (redness, swelling, localized heat, increased pain, fever > 101 F, chills) or have any questions regarding your home care instructions, please contact the wound center at [phone]. If after hours, contact your primary care physician or go to the hospital emergency room.

## 2023-01-13 NOTE — PROGRESS NOTES
Provider Encounter- Pressure Injury        HISTORY OF PRESENT ILLNESS  Wound History:    START OF CARE IN CLINIC: 5/3/2022    REFERRING PROVIDER: Sahara Mendez      WOUNDS- Pressure Injury, Sacrococcygeal, stage IV                                                             Surgical wound dehiscence, left medial lower leg-status post surgical I&D of stage IV pressure injury and           biologic application                    Pressure injury, DTI, left posterior lower leg-first observed in clinic 7/29/2022       Pressure injury stage II L heel - first noted 10/21     Right 2nd toe avulsion - first noted 10/21     Skin tag left posterior ear - first noted 10/21     HISTORY: Patient with history of incomplete quadriplegia referred to [facility] for treatment of a stage IV pressure injury.  He has a history of previous pressure injuries to this area, and underwent muscle flaps in 2019, and then again in 2020.  He was seen in the wound clinic in November 2021 for an ulcer proximal from his current ulcer, and pressure injuries to his left posterior lower leg and left heel.  At that time, it was discovered that the patient had retained VAC foam embedded in the wound bed of the sacral wound.  Attempts were made to get him back to his plastic surgeon, though unsuccessful.  In January he underwent surgical removal of VAC sponge along with excisional debridement of his sacral wound by Dr. Chaves.  After the surgery, his wound went on to heal without incident.   In early April 2022, his home health nurse noted a new sacral ulcer, below the previous ulcer which quickly tripled in size over the following weeks.  The ulcer to his left medial lower leg had also deteriorated, with bone visible at the base..  He was hospitalized from 4/22 until 4/27/2022 and underwent surgery with Dr. Raman on 4/26 for irrigation and debridement of multiple compartments of the left lower extremity, bone excision, and complex closure of chronic wound  using biologic skin substitute.   His sacrococcygeal wound was not surgically addressed during this admission.  He was discharged back to his group home, with home health, and referral to outpatient wound clinic for his sacral wound.  He was instructed to follow-up with his surgeon for his lower leg wound.       Postoperatively, the left medial lower leg incision dehisced.  He was seen by his surgeon at [facility] on 5/11.  The surgeon opted to leave remaining sutures in place, and refer him to the wound clinic for treatment of this wound.   Treatment of this wound was initiated in clinic on 5/12.  During this visit was also noted that his heel DTI had resolved, but that he had a new pressure injury to his left posterior lower extremity.     A new pressure injury was noted to patient's right upper buttock/lower back on 5/20/2022.  Wound was linear in shape, skin discolored but intact.     Abrasion noted to left anterior lower leg.  First observed in clinic on 7/22/2022.  Patient states he bumped his leg into his food tray.     Small DTI noted to patient's left lateral lower leg on 7/29/2022.  Skin intact but discolored.     Large area of deep tissue injury noted to patient's left exterior lower leg.  Patient denied any trauma to this area.  Skin intact.  Wound documented.    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  Never used smokeless tobacco    Patient's problem list, allergies, and current medications reviewed and updated in Epic    Interval History:  Interval History thinned 7/29/2022.  Please see previous notes for complete interval history.     7/29/2022 : Clinic visit with Kelly  ADILSON Sandy, HOLDEN, IMAN, CFCN.  Anjum states he continues to do well.  He was able to go to [place] yesterday, spent today [place].  His left lower extremity wound has deteriorated, presents today with significant odor and deterioration of tissue.  VAC held from this wound today after debridement.  I also did not apply biologic today.   Sacral wound about the same in area, though some evidence of increasing granulation.  No evidence of infection.  Small abrasion to his left lower leg appears to be improving.  He has a new small DTI to his left lateral lower leg, skin intact but discolored.    8/5/2022 : Clinic visit with ADILSON Arthur, HOLDEN, ALEXN, CFCN.  Anjum continues to feel well.  His left lower wound has improved with VAC hold, will discontinue from this wound altogether.  I did not apply biologic to this wound today given its current improvement.  DTI to posterior left leg is a bit darker today, skin still intact.  Patient states he has been wearing his heel float boot at all times.  Sacral wound with increased granulation, slight decrease in depth, bone still exposed.    8/12/2022 : Clinic visit with ADILSON Arthur, HOLDEN, IMAN, CFTRACI.   Anjum states he is feeling well.  His left lower leg wound continues to improve without the use of VAC or biologic.  The deep tissue injury to his posterior leg is gradually improving, area decreasing.  The abrasion to his left shin is now open, was covered with scab last week.  Sacral wound improving slowly, increase granulation, slight decrease in area.  Home health had messaged that they were concerned for infection due to odor.  I did not appreciate any significant odor today.  We did add Puracyn gel to the wound bed prior to reapplying VAC.  Anjum continues to spend most of his day up in his wheelchair, though tries to reposition frequently, and is wearing heel float boots bilaterally at all times.    8/19/2022 : Clinic visit with ADILSON Arthur,  HOLDEN, IMAN, LILIYA.   Turk states he is in his usual state of health, feeling well overall.  All of his wounds are progressing, though very slowly.  He continues to spend most of the day up in his wheelchair.  He does know to shift his weight frequently, and has an appropriate pressure relief cushion in his chair.    He was seen by [facility] earlier this week, for complaints of leakage around his suprapubic catheter and fever.  He was prescribed Keflex, and his catheter was changed    8/26/2022: Clinic visit with ADILSON Flores.  Patient reports overall he is feeling well denies any fever or chills.  Patient reports that he is continuously wearing Prevalon boots to offload bilateral lower extremities.  The left medial lower extremity wound is quite boggy with a small amount of turbulent fluid which was expressed with minimal palpation to the periwound area.  There is no foul-smelling odor emanating from the wound bed there is no erythema or edema.    9/2/2022 : Clinic visit with ADILSON Arthur, HOLDEN, IMAN, LILIYA.   States he is feeling well overall, denies fevers, chills, nausea, vomiting, cough or shortness of breath.  Unfortunately, his wounds are not progressing significantly.  Leg wound presents with boggy tissue, and probes to bone.  Area and depth of sacral wound have not changed significantly, bone still exposed.  Previously has been seen by several different plastic surgeons, including Dr. Rodriguez, Dr. Nicolas,  Dr. Mott, Dr. Chaves, he was also referred to Dr. Browne at [facility].  None of these surgeons have agreed to see him thus far.    9/9/2022: Clinic visit with Steve Hodges MD. Patient reports doing ok. Denies any changes to health or symptoms of infection. Wounds are not progressing, leg wound can be probed to bone and tissue is boggy. Wound VAC to sacrum with bone still exposed. He reports previously being treated for OM for 6 weeks without resolution. Discussed possibly getting  imaging to eval for OM, however with chronic OM there is limited to no role for prolonged Abx therapy per IDSA guidelines.    9/16/2022: Clinic visit with Steve Hodges MD. Patient reports feeling his normal state of health, denies any fevers or chills. His medial left leg wound is boggy and I was able to express some tan fluid concerning for purulence. Will take wound culture and order abx as appropriate for the results. Will not start empirical Abx as no evidence of cellulitis and his history of multiple rounds of Abx in the past. Will order CT scan of lower extremity and sacrum to further evaluate areas. Sacrum still to bone, had previous history of some kind of retained material in wound bed, will obtain the CT scan to evaluate.    9/23/2022: Clinic visit with Steve Hodges MD. Patient reports doing ok, denies any fevers or chills. Patient is taking Augmentin, reports tolerating well without any side effects. Tissue quality leg wound remains poor, still with some purulence able to be expressed but less than last week. Patient has CT scan planned for 10/3 and had labs today to ensure adequate renal function. Sacrum measuring smaller, with larger undermining.    9/30/2022 : Clinic visit with ADILSON Arthur, HOLDEN, IMAN, LILIYA.   Anjum states that he is feeling well.  He is still taking Augmentin, reports no adverse effects.  Purulent drainage still noted from lower leg wound, with boggy, dusky tissue in the wound bed.  We will extend Augmentin and additional 2 weeks.   Opening of sacral wound has decreased since last assessment, increase granulation within the wound bed noted, however bone still exposed.  His CT is scheduled for Monday 10/3, of both sacrum and his lower leg.    10/7/2022 : Clinic visit with ADILSON Arthur, HOLDEN, IMAN, LILIYA.   Anjum states he is feeling well, offers no complaints.  CT results discussed with him in clinic today, as well as recommendations from interdisciplinary rounds  a few days ago.  He was agreeable to allowing for today I&D of his leg wound today.  Will place referral to Dr. Saini for consideration of plastic intervention.  Patient states that if he were to undergo a flap procedure, he would be willing to be admitted to a skilled nursing facility long enough to allow for healing.   By removed a small chip of bone from leg wound during I&D.  Tissue is dusky and friable.  Will refer patient back to Dr. Raman, orthopedics, for reevaluation.    10/14/2022: Clinic visit with Steve Hodges MD. Patient reports doing well, denies any fevers or chills. Patient reports that his home health nurse feels leg wound improving, appears unchanged and probes to bone. CT scan of leg without definitive evidence of OM, was referred back to orthopedics with Dr. Raman but does not have appointment yet. He was also referred to Dr. Saini, does not have appointment.    10/21/2022: Clinic visit with Steve Hodges MD. Patient reports doing well, denies any symptoms of infection. He presents with left posterior ear skin tag and asked if I could address in clinic as it causes him discomfort when wearing mask. No significant change to left lower extremity wound or sacral wound. He does report having appointment with Dr. Raman 11/14 and with Dr. Saini sometime in November. Patient was found to have near complete avulsion to right 2nd toenail, he does not recall any specific trauma but is insensate. He was also noted to have new stage II pressure injury left heel despite wearing prevalon boots. Counseled to continue offloading heel as much as possible, can place pillow under leg to completely offload heel.    11/4/2022: Clinic visit with Steve Hodges MD. Patient reports doing well, denies any fevers or chills and generally feels well. Patient had episode of Afib and was restarted on Xarelto by cardiology, reports plan for whatchman procedure at some point in future. When assessing left lower  extremity wound, he was noted to have brisk venous bleeding with removal of dressing. Was not debrided and pressure dressing applied with hemostasis achieved. Left heel has increased in depth to stage III pressure injury despite wearing prevalon boots 24 hours a day, his Meliplex was not in place today. We discussed further offloading with pillow under left leg to allow for heel to be frefloated off mattress. Sacrum largely unchanged.   Reports appointment with Dr. Saini next week and appointment with Dr. Raman on 11/14.    11/11/2022: Clinic visit with Steve Hodges MD. Patient reports feeling well, denies any fevers or chills. Wound to his left leg appear worsening, now two distinct wounds that probe to bone and communicate. He has appointment with Dr. Raman next week. Counseled him to possibly consider amputation, reports that he has been discussing with Dr. Raman.   Sacrum remains largely unchanged. He is being evaluated for possible closure by Dr. Saini. I discussed case with Dr. Saini and he will evaluate wound care pictures to determine if he is candidate for surgical intervention.    11/18/2022: Clinic visit with Steve Hodges MD. Patient reports doing well. He has scheduled RICHARD and probable watchman device placement by interventional cardiology on Tuesday next week, he expects to be admitted through Wednesday morning. I called and coordinated with inpatient wound care team to have wound VAC and dressings changed while admitted. Sacral wound remains stable, measures roughly the same. Has not had follow up with Dr. Saini.   Patient was seen by Dr. Raman, as there does not appear to be acute infection he recommends continuing wound care but will reevaluate patient in 6 weeks and if no improvement may proceed with surgical I&D. Tissue quality remains poor and friable. Wound probes to bone.     12/2/2022 : Clinic visit with ADILSON Arthur, FNP-BC, CWDINESHN, CFCN.   Anjum continues to feel well  overall.  He is now driving himself to appointments.  [facility], however [facility] will [facility], starting on Monday.  No significant changes to his wounds.  He has not heard anything from Dr. Saini about possible surgical intervention.    12/9/2022 : Clinic visit with ADILSON Arthur, FNPEGGY-BC, CWDINESHN, CFTRACI.   Anjum states he is feeling very well today.  His lateral leg wound i has deteriorated, area has increased,, tissue quality poor.  Increased granulation tissue noted to sacral wound.  There appears to be new granulation over bone.   Time spent today discussing possible surgical intervention.  I did find out that direct admission to [facility] might be possible, where Dr. Saini is medical director.  Plan would be for patient to undergo surgical debridement, possible veraflo placement, and antibiotics.  Anjum is open to this idea, but states he would not consider until after the holidays.    12/16/2022: Clinic visit with Steve Hodges MD. Patient denies any complaints. Tissue quality left leg remains poor and friable. Sacral pressure injury with increased slough today, slowly improving granulation tissue over bone.   I messaged Dr. Saini today, he will evaluate patient following the new year for possible reconstruction, we had discussed possible admission to [facility] in future.    12/30/2022: Clinic visit with Steve Hodges MD. Patient reports feeling in normal state of health. He reports that he was up in chair more frequently this weekend due to holidays. He reports that his VAC was not alarming, but had drainage from VAC dressing that was getting on clothes and no drainage in VAC canister. It appears that due to skin fold and sitting on VAC prevented proper moisture management. Despite using prevalon boots his left heel DTI has reopened. Patient has appointment with Dr. Raman next week, he does not have appointment with Dr. Saini yet.    1/13/2023:  Clinic visit with Steve Hodges MD. Patient had problems with obstruction of hutchins and was in the ED twice. He was diagnosed with UTI and prescribed Augmentin by ED. He reports otherwise feeling well. Left leg wound continues to be boggy and hypergranular, wound continues to probe to bone. Has appointment with Dr. Raman next week. Sacral pressure ulcer is smaller with increased bone coverage. VAC has been off for the past 2 weeks. As wound improving will have him return wound VAC. He has not heard from Dr. Saini.       REVIEW OF SYSTEMS:   Unchanged from previous wound clinic visit on 12/30/2022, except otherwise noted above.    PHYSICAL EXAMINATION:   BP 91/54   Pulse 60   Temp 36.7 °C (98.1 °F) (Temporal)   Resp 16   SpO2 96%   Physical Exam  Constitutional:       Appearance: He is obese.   Cardiovascular:      Rate and Rhythm: Bradycardia present.   Pulmonary:      Effort: Pulmonary effort is normal. No respiratory distress.      Breath sounds: No wheezing.   Skin:     Comments: Stage IV coccygeal pressure injury - Depth and undermining smaller with increased coverage over bone.  No odor or purulence.    Full-thickness wound to left medial lower leg - skin bridge formed, no change in size, Continues to have boggy friable tissue that probes to bone.  Moderate serosanguineous drainage, no odor, no periwound erythema or induration.      Stage III pressure injury left posterior heel - Remains open, but superficial.  Left anterior lower leg wounds are stable, nearly resolved  Left posterior/lateral lower leg wounds- small opening    Refer to wound flowsheet and photos   Neurological:      Mental Status: He is alert and oriented to person, place, and time.       WOUND ASSESSMENT  Wound 04/22/22 Pressure Injury Sacrum POA stage 4 (Active)   Wound Image   01/13/23 1400   Site Assessment Red;Other (Comment) 01/13/23 1400   Periwound Assessment Scar tissue;Fragile 01/13/23 1400   Margins Unattached edges 01/13/23  1400   Drainage Amount Large 01/13/23 1400   Drainage Description Serosanguineous 01/13/23 1400   Treatments Cleansed;Site care 01/13/23 1400   Wound Cleansing Puracyn McDonald 01/13/23 1400   Periwound Protectant Skin Protectant Wipes to Periwound;Barrier Paste 01/13/23 1400   Dressing Cleansing/Solutions Not Applicable 01/13/23 1400   Dressing Options Hydrofiber Silver Strip;Hydrofiber Silver;Mepilex 01/13/23 1400   Dressing Changed New 01/13/23 1400   Dressing Change/Treatment Frequency Monday, Wednesday, Friday, and As Needed 01/13/23 1400   WOUND NURSE ONLY - Pressure Injury Stage 4 01/13/23 1400   Wound Length (cm) 1.5 cm 01/13/23 1400   Wound Width (cm) 1.7 cm 01/13/23 1400   Wound Depth (cm) 1.3 cm 01/13/23 1400   Wound Surface Area (cm^2) 2.55 cm^2 01/13/23 1400   Wound Volume (cm^3) 3.315 cm^3 01/13/23 1400   Post-Procedure Length (cm) 1.5 cm 01/13/23 1400   Post-Procedure Width (cm) 1.7 cm 01/13/23 1400   Post-Procedure Depth (cm) 1.3 cm 01/13/23 1400   Post-Procedure Surface Area (cm^2) 2.55 cm^2 01/13/23 1400   Post-Procedure Volume (cm^3) 3.315 cm^3 01/13/23 1400   Wound Healing % -38 01/13/23 1400   Tunneling (cm) 0 cm 01/13/23 1400   Tunneling Clock Position of Wound 9 11/04/22 1300   Undermining (cm) 1.5 cm 01/13/23 1400   Undermining of Wound, 1st Location From 9 o'clock;To 3 o'clock 01/13/23 1400   Wound Odor None 01/13/23 1400   Exposed Structures Other (Comments) 01/13/23 1400   Number of days: 266       Wound 07/22/22 Traumatic Leg Anterior Left (Active)   Wound Image   01/13/23 1400   Site Assessment Purple;Red 01/13/23 1400   Periwound Assessment Dry;Intact 01/13/23 1400   Margins Attached edges 08/19/22 1400   Drainage Amount None 01/13/23 1400   Drainage Description Serosanguineous 08/19/22 1400   Treatments Cleansed;Site care 01/13/23 1400   Wound Cleansing Puracyn McDonald 01/13/23 1400   Periwound Protectant Skin Protectant Wipes to Periwound 01/13/23 1400   Dressing Cleansing/Solutions Not  Applicable 01/13/23 1400   Dressing Options Tubigrip 01/13/23 1400   Dressing Changed Changed 01/13/23 1400   Dressing Change/Treatment Frequency Monday, Wednesday, Friday, and As Needed 01/13/23 1400   WOUND NURSE ONLY - Pressure Injury Stage DTPI 12/30/22 1400   Non-staged Wound Description Not applicable 01/13/23 1400   Wound Length (cm) 0.4 cm 08/19/22 1400   Wound Width (cm) 0.4 cm 08/19/22 1400   Wound Depth (cm) 0.1 cm 08/19/22 1400   Wound Surface Area (cm^2) 0.16 cm^2 08/19/22 1400   Wound Volume (cm^3) 0.016 cm^3 08/19/22 1400   Tunneling (cm) 0 cm 12/02/22 1527   Undermining (cm) 0 cm 12/02/22 1527   Wound Odor None 01/13/23 1400   Exposed Structures None 01/13/23 1400   Number of days: 175       Wound 07/29/22 Pressure Injury Leg Posterior;Lateral Left (Active)   Wound Image   01/13/23 1400   Site Assessment Purple;Red 01/13/23 1400   Periwound Assessment Dry;Intact;Dark edges 01/13/23 1400   Margins Attached edges;Undefined edges 01/13/23 1400   Drainage Amount Scant 01/13/23 1400   Drainage Description Serosanguineous 01/13/23 1400   Treatments Cleansed;Site care 01/13/23 1400   Wound Cleansing Puracyn Hedley 01/13/23 1400   Periwound Protectant Skin Protectant Wipes to Periwound;Barrier Paste 01/13/23 1400   Dressing Cleansing/Solutions Not Applicable 01/13/23 1400   Dressing Options Hydrofiber Silver;Mepilex;Tubigrip 01/13/23 1400   Dressing Changed Changed 01/13/23 1400   Dressing Change/Treatment Frequency Monday, Wednesday, Friday, and As Needed 01/13/23 1400   WOUND NURSE ONLY - Pressure Injury Stage DTPI 01/13/23 1400   Wound Length (cm) 1 cm 01/13/23 1400   Wound Width (cm) 1.5 cm 01/13/23 1400   Wound Depth (cm) 0.1 cm 01/13/23 1400   Wound Surface Area (cm^2) 1.5 cm^2 01/13/23 1400   Wound Volume (cm^3) 0.15 cm^3 01/13/23 1400   Post-Procedure Length (cm) 1 cm 01/13/23 1400   Post-Procedure Width (cm) 1.5 cm 01/13/23 1400   Post-Procedure Depth (cm) 0.1 cm 01/13/23 1400   Post-Procedure  Surface Area (cm^2) 1.5 cm^2 01/13/23 1400   Post-Procedure Volume (cm^3) 0.15 cm^3 01/13/23 1400   Wound Healing % -66656 01/13/23 1400   Tunneling (cm) 0 cm 01/13/23 1400   Undermining (cm) 0 cm 01/13/23 1400   Wound Odor None 01/13/23 1400   Exposed Structures None 01/13/23 1400   Number of days: 168       Wound 10/21/22 Pressure Injury Left Posterior Heel stage 3 (Active)   Wound Image   01/13/23 1400   Site Assessment Fragile;Purple;Red 01/13/23 1400   Periwound Assessment Fragile;Scar tissue;Dark edges 01/13/23 1400   Margins Attached edges 01/13/23 1400   Drainage Amount Small 01/13/23 1400   Drainage Description Serosanguineous 01/13/23 1400   Treatments Cleansed;Site care 01/13/23 1400   Wound Cleansing Puracyn Anton 01/13/23 1400   Periwound Protectant Skin Protectant Wipes to Periwound;Barrier Paste 01/13/23 1400   Dressing Cleansing/Solutions Not Applicable 01/13/23 1400   Dressing Options Hydrofiber Silver;Mepilex Heel;Tubigrip 01/13/23 1400   Dressing Changed Changed 01/13/23 1400   Dressing Status Clean;Dry;Intact 12/30/22 1400   Dressing Change/Treatment Frequency Monday, Wednesday, Friday, and As Needed 01/13/23 1400   WOUND NURSE ONLY - Pressure Injury Stage 3 01/13/23 1400   Non-staged Wound Description Not applicable 11/18/22 1300   Wound Length (cm) 3 cm 01/13/23 1400   Wound Width (cm) 3 cm 01/13/23 1400   Wound Depth (cm) 0.1 cm 01/13/23 1400   Wound Surface Area (cm^2) 9 cm^2 01/13/23 1400   Wound Volume (cm^3) 0.9 cm^3 01/13/23 1400   Post-Procedure Length (cm) 3 cm 01/13/23 1400   Post-Procedure Width (cm) 3 cm 01/13/23 1400   Post-Procedure Depth (cm) 0.1 cm 01/13/23 1400   Post-Procedure Surface Area (cm^2) 9 cm^2 01/13/23 1400   Post-Procedure Volume (cm^3) 0.9 cm^3 01/13/23 1400   Wound Healing % -5900 01/13/23 1400   Tunneling (cm) 0 cm 01/13/23 1400   Undermining (cm) 0 cm 01/13/23 1400   Wound Odor None 01/13/23 1400   Exposed Structures None 01/13/23 1400   Number of days: 84        Wound 11/11/22 LLE Medial (anterior and posterior merged) (Active)   Wound Image   01/13/23 1400   Site Assessment Red;Boggy;Fragile 01/13/23 1400   Periwound Assessment Fragile;Scar tissue;Red 01/13/23 1400   Margins Unattached edges 01/13/23 1400   Drainage Amount Moderate 01/13/23 1400   Drainage Description Sanguineous 01/13/23 1400   Treatments Cleansed;Site care 01/13/23 1400   Wound Cleansing Puracyn [place] 01/13/23 1400   Periwound Protectant Skin Protectant Wipes to Periwound;Barrier Paste 01/13/23 1400   Dressing Cleansing/Solutions Not Applicable 01/13/23 1400   Dressing Options Other (Comments);Mepilex;Tubigrip;Adaptic 01/13/23 1400   Dressing Changed New 01/13/23 1400   Dressing Change/Treatment Frequency Monday, Wednesday, Friday, and As Needed 01/13/23 1400   Non-staged Wound Description Full thickness 01/13/23 1400   Wound Length (cm) 2.5 cm 01/13/23 1400   Wound Width (cm) 3.2 cm 01/13/23 1400   Wound Depth (cm) 1 cm 12/30/22 1400   Wound Surface Area (cm^2) 8 cm^2 01/13/23 1400   Wound Volume (cm^3) 8.75 cm^3 12/30/22 1400   Post-Procedure Length (cm) 2.5 cm 01/13/23 1400   Post-Procedure Width (cm) 3.5 cm 01/13/23 1400   Post-Procedure Depth (cm) 1 cm 12/30/22 1400   Post-Procedure Surface Area (cm^2) 8.75 cm^2 01/13/23 1400   Post-Procedure Volume (cm^3) 8.75 cm^3 12/30/22 1400   Wound Healing % -1202 12/30/22 1400   Tunneling (cm) 0 cm 01/13/23 1400   Tunneling Clock Position of Wound 9 11/11/22 1300   Undermining (cm) 0 cm 01/13/23 1400   Wound Odor None 01/13/23 1400   Exposed Structures Other (Comments) 01/13/23 1400   Number of days: 63       PROCEDURE:   - No debridement performed today.      BIOLOGIC LOG  5/20/2022-first application.  PRODUCT: EpiCord 2 x 3 cm . PRODUCT #[id number]; EXPIRES: 2026-12-01 5/27/2022- second application.  PRODUCT: EpiCordEX 2 x 3 cm . PRODUCT #[id number]; EXPIRES: 2026-09-01    6/3/2022- third application.  PRODUCT: EpiCordEX 2 x 3 cm .  PRODUCT #EX [id number]; EXPIRES: 2026-10-01    6/10/2022- fourth application.  PRODUCT: EpiCordEX 2 x 3 cm . PRODUCT #EX [id number]; EXPIRES: 2026-09-01 6/17/2022- fifth application.  PRODUCT: EpiCordEX 2 x 3 cm . PRODUCT #EX [id number]; EXPIRES: 2027-02-01 6/24/2022- sixth application.  PRODUCT: EpiCordEX 2 x 3 cm . PRODUCT #[id number]; EXPIRES: 2027-02-01 07/01/22: Seventh application.  Product: Epicort Dx 2.0 x 3.0 cm reorder number [id number]; product number [id number]; expires 2027-02-01 7/22/2022- eighth application.  PRODUCT: EpiCord 2 x 3 cm, reorder #[id number]. PRODUCT #[id number]; EXPIRES: 2027-02-01      Pertinent Labs and Diagnostics:    Labs:     A1c: No results found for: HBA1C     [facility] results, 7/1/2022 (under media tab)    CRP 13    ESR 31      IMAGING:     10/3/2022-CT of pelvis with contrast  IMPRESSION:     1.  Posterior soft tissue sacral wound and 1.5 x 3.7 cm abscess.   2.  Progressive destruction of the distal sacrum and proximal coccyx. Sclerosis and irregularity of the distal sacrum consistent with osteomyelitis.   3.  Old wound within the soft tissues posterior to the distal left SI joint with possible fistulous communication.    10/3/2022-CT of tib-fib with and without contrast, with reconstruction  IMPRESSION:     1.  Old fractures of the left tibia and fibula with extensive callus formation and bony bridging as well as extensive dystrophic calcifications. Similar to previous.   2.  Distal medial soft tissue wounds with ill-defined superficial in the soft tissue thickening and fluid collections suggestive of phlegmon. No organized abscess identified.   3.  No definite osteomyelitis.   4.  Arthritic changes.        VASCULAR STUDIES: No results found.    LAST  WOUND CULTURE:   Lab Results   Component Value Date/Time    CULTRSULT - (A) 01/07/2023 11:54 AM    CULTRSULT Proteus mirabilis  46,000 cfu/mL   (A) 01/07/2023 11:54 AM    CULTRSULT  Candida tropicalis  6,000 cfu/mL   (A) 01/07/2023 11:54 AM    CULTRSULT Enterococcus faecalis  100 cfu/mL   (A) 01/07/2023 11:54 AM          ASSESSMENT AND PLAN:     1. Sacral decubitus ulcer, stage IV (HCC)  Comments: Ulcer first noted in early April 2022 as small open area which quickly enlarged.  This ulcer is present distal from previous sacral ulcer which healed after surgery in January.  Patient has history of flap reconstruction x2 to this area.  CT shows progressive destruction of distal sacrum and proximal coccyx.    1/13/2023: Wound with less undermining and increased tissue growth, improved bone coverage.  - No debridement required today  -Patient is to return to clinic weekly for assessment and debridement   - Wound has improved without wound VAC for the past 2 weeks. Will discontinue wound VAC for now. He is to return VAC to   - Patient does not currently have follow up with Dr. Saini. If wound fails to improve can consider re-establishing with Dr. Saini for evaluation for coverage options.    Wound care: Hydrofiber silver strip, hydrofiber silver, Mepilex    2. Postoperative wound dehiscence, subsequent encounter  Comments: On 4/26 patient underwent irrigation and debridement of multiple compartments of the left lower extremity states for pressure injury, with bone excision, and complex closure of chronic wound using biologic skin substitute.  During postop visit in surgeons office on 5/11, surgical site was noted to be dehisced.  Patient was referred to wound clinic for management.    1/13/2023: Wound tissue remains poor, friable and boggy and wound probes to bone  -Did not require debridement today  -Patient to return to clinic weekly for assessment and debridement as needed  -Home health to change dressing 1-2 times per week in between clinic visits   -Patient to follow-up with Dr. Raman next week  -Consider resuming biologic and/or VAC, do not think that tissue quality is sufficient  for biologic.  -I suspect pressure may be an issue still.  Patient states he is wearing Prevalon boot at all times    Wound care: Hydrofera Blue, Mepilex    3. Deep tissue injury  4. Pressure injury of left leg, unstageable ([facility])  Comments: DTI to posterior left lower leg first observed in clinic 7/29/2022.  Patient does not know how it started, had been wearing heel float boot consistently.    1/13/2023: Wounds superficial, slightly open. Stable  -Continue to monitor for any deterioration in tissue quality  -Patient is currently wearing Prevalon boots to help prevent pressure injuries to bilateral lower extremities    Wound care: Mepilex, Tubigrip D    5. Pressure injury of left heel, stage 3 ([facility])  Comments: First noted in clinic on 10/21/2022, originally noted to be stage II    1/13/2023: Wounds have reopened. Are superficial and stable  - No debridement required  - Patient reports wearing Prevalon boots 24 hours a day. He is clear having pressure to heel and recommend offloading heels off mattress.       Wound Care: Hydrofiber silver, Heel Mepilex to reduce pressure and friction    6. Quadriplegia, C5-C7, complete ([facility])  Comments: Complicating factor.  Impaired mobility and sensation  -Patient is still spending 7-8 hours/day up in his wheelchair, though he does have appropriate offloading cushion, and knows to reposition frequently.  Wears heel float boots bilaterally at all times      Please note that this note may have been created using voice recognition software. I have worked with technical experts from [employer] to optimize the interface.  I have made every reasonable attempt to correct obvious errors, but there may be errors of grammar and possibly content that I did not discover before finalizing the note.

## 2023-01-14 ENCOUNTER — NON-PROVIDER VISIT (OUTPATIENT)
Dept: CARDIOLOGY | Facility: MEDICAL CENTER | Age: 73
End: 2023-01-14
Payer: MEDICARE

## 2023-01-14 PROCEDURE — 93298 REM INTERROG DEV EVAL SCRMS: CPT | Performed by: INTERNAL MEDICINE

## 2023-01-14 NOTE — PROGRESS NOTES
Updated Home Health wound care orders faxed to [facility] [phone]. NPWT to sacrum discontinued today per Dr. Hodges.

## 2023-01-16 NOTE — CARDIAC REMOTE MONITOR - SCAN
Device transmission reviewed. Device demonstrated appropriate function.       Electronically Signed by: Lex Skinner M.D.    1/16/2023  12:44 PM

## 2023-01-18 ENCOUNTER — PRE-ADMISSION TESTING (OUTPATIENT)
Dept: ADMISSIONS | Facility: MEDICAL CENTER | Age: 73
End: 2023-01-18
Attending: INTERNAL MEDICINE
Payer: MEDICARE

## 2023-01-18 NOTE — OR NURSING
Per anesthesia protocol, patient needs pre-op lab work to include BMP. Pt is a quadriplegic in motorized wheelchair. Will complete pre-op lab work day of procedure.

## 2023-01-20 ENCOUNTER — OFFICE VISIT (OUTPATIENT)
Dept: WOUND CARE | Facility: MEDICAL CENTER | Age: 73
End: 2023-01-20
Attending: INTERNAL MEDICINE
Payer: MEDICARE

## 2023-01-20 VITALS
TEMPERATURE: 97.7 F | SYSTOLIC BLOOD PRESSURE: 128 MMHG | OXYGEN SATURATION: 94 % | RESPIRATION RATE: 17 BRPM | HEART RATE: 60 BPM | DIASTOLIC BLOOD PRESSURE: 70 MMHG

## 2023-01-20 DIAGNOSIS — L89.890 PRESSURE INJURY OF LEFT LEG, UNSTAGEABLE (HCC): ICD-10-CM

## 2023-01-20 DIAGNOSIS — L89.154 SACRAL DECUBITUS ULCER, STAGE IV (HCC): ICD-10-CM

## 2023-01-20 DIAGNOSIS — T14.8XXA DEEP TISSUE INJURY: ICD-10-CM

## 2023-01-20 DIAGNOSIS — T81.31XD POSTOPERATIVE WOUND DEHISCENCE, SUBSEQUENT ENCOUNTER: ICD-10-CM

## 2023-01-20 DIAGNOSIS — L89.623 PRESSURE INJURY OF LEFT HEEL, STAGE 3 (HCC): ICD-10-CM

## 2023-01-20 DIAGNOSIS — G82.53 QUADRIPLEGIA, C5-C7, COMPLETE (HCC): ICD-10-CM

## 2023-01-20 PROCEDURE — 11042 DBRDMT SUBQ TIS 1ST 20SQCM/<: CPT

## 2023-01-20 PROCEDURE — 17250 CHEM CAUT OF GRANLTJ TISSUE: CPT | Mod: 59 | Performed by: STUDENT IN AN ORGANIZED HEALTH CARE EDUCATION/TRAINING PROGRAM

## 2023-01-20 PROCEDURE — 11043 DBRDMT MUSC&/FSCA 1ST 20/<: CPT | Performed by: STUDENT IN AN ORGANIZED HEALTH CARE EDUCATION/TRAINING PROGRAM

## 2023-01-20 PROCEDURE — 17250 CHEM CAUT OF GRANLTJ TISSUE: CPT

## 2023-01-20 NOTE — PROGRESS NOTES
Provider Encounter- Pressure Injury        HISTORY OF PRESENT ILLNESS  Wound History:    START OF CARE IN CLINIC: 5/3/2022    REFERRING PROVIDER: Sahara Mendez      WOUNDS- Pressure Injury, Sacrococcygeal, stage IV                                                             Surgical wound dehiscence, left medial lower leg-status post surgical I&D of stage IV pressure injury and           biologic application                    Pressure injury, DTI, left posterior lower leg-first observed in clinic 7/29/2022       Pressure injury stage II L heel - first noted 10/21     Right 2nd toe avulsion - first noted 10/21     Skin tag left posterior ear - first noted 10/21     HISTORY: Patient with history of incomplete quadriplegia referred to [facility] for treatment of a stage IV pressure injury.  He has a history of previous pressure injuries to this area, and underwent muscle flaps in 2019, and then again in 2020.  He was seen in the wound clinic in November 2021 for an ulcer proximal from his current ulcer, and pressure injuries to his left posterior lower leg and left heel.  At that time, it was discovered that the patient had retained VAC foam embedded in the wound bed of the sacral wound.  Attempts were made to get him back to his plastic surgeon, though unsuccessful.  In January he underwent surgical removal of VAC sponge along with excisional debridement of his sacral wound by Dr. Chaves.  After the surgery, his wound went on to heal without incident.   In early April 2022, his home health nurse noted a new sacral ulcer, below the previous ulcer which quickly tripled in size over the following weeks.  The ulcer to his left medial lower leg had also deteriorated, with bone visible at the base..  He was hospitalized from 4/22 until 4/27/2022 and underwent surgery with Dr. Raman on 4/26 for irrigation and debridement of multiple compartments of the left lower extremity, bone excision, and complex closure of chronic wound  using biologic skin substitute.   His sacrococcygeal wound was not surgically addressed during this admission.  He was discharged back to his group home, with home health, and referral to outpatient wound clinic for his sacral wound.  He was instructed to follow-up with his surgeon for his lower leg wound.       Postoperatively, the left medial lower leg incision dehisced.  He was seen by his surgeon at [facility] on 5/11.  The surgeon opted to leave remaining sutures in place, and refer him to the wound clinic for treatment of this wound.   Treatment of this wound was initiated in clinic on 5/12.  During this visit was also noted that his heel DTI had resolved, but that he had a new pressure injury to his left posterior lower extremity.     A new pressure injury was noted to patient's right upper buttock/lower back on 5/20/2022.  Wound was linear in shape, skin discolored but intact.     Abrasion noted to left anterior lower leg.  First observed in clinic on 7/22/2022.  Patient states he bumped his leg into his food tray.     Small DTI noted to patient's left lateral lower leg on 7/29/2022.  Skin intact but discolored.     Large area of deep tissue injury noted to patient's left exterior lower leg.  Patient denied any trauma to this area.  Skin intact.  Wound documented.    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  Never used smokeless tobacco    Patient's problem list, allergies, and current medications reviewed and updated in Epic    Interval History:  Interval History thinned 7/29/2022.  Please see previous notes for complete interval history.     7/29/2022 : Clinic visit with Kelly  ADILSON Sandy, HOLDEN, IMAN, CFCN.  Anjum states he continues to do well.  He was able to go to [place] yesterday, spent today [place].  His left lower extremity wound has deteriorated, presents today with significant odor and deterioration of tissue.  VAC held from this wound today after debridement.  I also did not apply biologic today.   Sacral wound about the same in area, though some evidence of increasing granulation.  No evidence of infection.  Small abrasion to his left lower leg appears to be improving.  He has a new small DTI to his left lateral lower leg, skin intact but discolored.    8/5/2022 : Clinic visit with ADILSON Arthur, HOLDEN, ALEXN, CFCN.  Anjum continues to feel well.  His left lower wound has improved with VAC hold, will discontinue from this wound altogether.  I did not apply biologic to this wound today given its current improvement.  DTI to posterior left leg is a bit darker today, skin still intact.  Patient states he has been wearing his heel float boot at all times.  Sacral wound with increased granulation, slight decrease in depth, bone still exposed.    8/12/2022 : Clinic visit with ADILSON Arthur, HOLDEN, IMAN, CFTRACI.   Anjum states he is feeling well.  His left lower leg wound continues to improve without the use of VAC or biologic.  The deep tissue injury to his posterior leg is gradually improving, area decreasing.  The abrasion to his left shin is now open, was covered with scab last week.  Sacral wound improving slowly, increase granulation, slight decrease in area.  Home health had messaged that they were concerned for infection due to odor.  I did not appreciate any significant odor today.  We did add Puracyn gel to the wound bed prior to reapplying VAC.  Anjum continues to spend most of his day up in his wheelchair, though tries to reposition frequently, and is wearing heel float boots bilaterally at all times.    8/19/2022 : Clinic visit with ADILSON Arthur,  HOLDEN, IMAN, LILIYA.   Turk states he is in his usual state of health, feeling well overall.  All of his wounds are progressing, though very slowly.  He continues to spend most of the day up in his wheelchair.  He does know to shift his weight frequently, and has an appropriate pressure relief cushion in his chair.    He was seen by [facility] earlier this week, for complaints of leakage around his suprapubic catheter and fever.  He was prescribed Keflex, and his catheter was changed    8/26/2022: Clinic visit with ADILSON Flores.  Patient reports overall he is feeling well denies any fever or chills.  Patient reports that he is continuously wearing Prevalon boots to offload bilateral lower extremities.  The left medial lower extremity wound is quite boggy with a small amount of turbulent fluid which was expressed with minimal palpation to the periwound area.  There is no foul-smelling odor emanating from the wound bed there is no erythema or edema.    9/2/2022 : Clinic visit with ADILSON Arthur, HOLDEN, IMAN, LILIYA.   States he is feeling well overall, denies fevers, chills, nausea, vomiting, cough or shortness of breath.  Unfortunately, his wounds are not progressing significantly.  Leg wound presents with boggy tissue, and probes to bone.  Area and depth of sacral wound have not changed significantly, bone still exposed.  Previously has been seen by several different plastic surgeons, including Dr. Rodriguez, Dr. Nicolas,  Dr. Mott, Dr. Chaves, he was also referred to Dr. Browne at [facility].  None of these surgeons have agreed to see him thus far.    9/9/2022: Clinic visit with Steve Hodges MD. Patient reports doing ok. Denies any changes to health or symptoms of infection. Wounds are not progressing, leg wound can be probed to bone and tissue is boggy. Wound VAC to sacrum with bone still exposed. He reports previously being treated for OM for 6 weeks without resolution. Discussed possibly getting  imaging to eval for OM, however with chronic OM there is limited to no role for prolonged Abx therapy per IDSA guidelines.    9/16/2022: Clinic visit with Steve Hodges MD. Patient reports feeling his normal state of health, denies any fevers or chills. His medial left leg wound is boggy and I was able to express some tan fluid concerning for purulence. Will take wound culture and order abx as appropriate for the results. Will not start empirical Abx as no evidence of cellulitis and his history of multiple rounds of Abx in the past. Will order CT scan of lower extremity and sacrum to further evaluate areas. Sacrum still to bone, had previous history of some kind of retained material in wound bed, will obtain the CT scan to evaluate.    9/23/2022: Clinic visit with Steve Hodges MD. Patient reports doing ok, denies any fevers or chills. Patient is taking Augmentin, reports tolerating well without any side effects. Tissue quality leg wound remains poor, still with some purulence able to be expressed but less than last week. Patient has CT scan planned for 10/3 and had labs today to ensure adequate renal function. Sacrum measuring smaller, with larger undermining.    9/30/2022 : Clinic visit with ADILSON Arthur, HOLDEN, IMAN, LILIYA.   Anjum states that he is feeling well.  He is still taking Augmentin, reports no adverse effects.  Purulent drainage still noted from lower leg wound, with boggy, dusky tissue in the wound bed.  We will extend Augmentin and additional 2 weeks.   Opening of sacral wound has decreased since last assessment, increase granulation within the wound bed noted, however bone still exposed.  His CT is scheduled for Monday 10/3, of both sacrum and his lower leg.    10/7/2022 : Clinic visit with ADILSON Arthur, HOLDEN, IMAN, LILIYA.   Anjum states he is feeling well, offers no complaints.  CT results discussed with him in clinic today, as well as recommendations from interdisciplinary rounds  a few days ago.  He was agreeable to allowing for today I&D of his leg wound today.  Will place referral to Dr. Saini for consideration of plastic intervention.  Patient states that if he were to undergo a flap procedure, he would be willing to be admitted to a skilled nursing facility long enough to allow for healing.   By removed a small chip of bone from leg wound during I&D.  Tissue is dusky and friable.  Will refer patient back to Dr. Raman, orthopedics, for reevaluation.    10/14/2022: Clinic visit with Steve Hodges MD. Patient reports doing well, denies any fevers or chills. Patient reports that his home health nurse feels leg wound improving, appears unchanged and probes to bone. CT scan of leg without definitive evidence of OM, was referred back to orthopedics with Dr. Raman but does not have appointment yet. He was also referred to Dr. Saini, does not have appointment.    10/21/2022: Clinic visit with Steve Hodges MD. Patient reports doing well, denies any symptoms of infection. He presents with left posterior ear skin tag and asked if I could address in clinic as it causes him discomfort when wearing mask. No significant change to left lower extremity wound or sacral wound. He does report having appointment with Dr. Raman 11/14 and with Dr. Saini sometime in November. Patient was found to have near complete avulsion to right 2nd toenail, he does not recall any specific trauma but is insensate. He was also noted to have new stage II pressure injury left heel despite wearing prevalon boots. Counseled to continue offloading heel as much as possible, can place pillow under leg to completely offload heel.    11/4/2022: Clinic visit with Steve Hodges MD. Patient reports doing well, denies any fevers or chills and generally feels well. Patient had episode of Afib and was restarted on Xarelto by cardiology, reports plan for whatchman procedure at some point in future. When assessing left lower  extremity wound, he was noted to have brisk venous bleeding with removal of dressing. Was not debrided and pressure dressing applied with hemostasis achieved. Left heel has increased in depth to stage III pressure injury despite wearing prevalon boots 24 hours a day, his Meliplex was not in place today. We discussed further offloading with pillow under left leg to allow for heel to be frefloated off mattress. Sacrum largely unchanged.   Reports appointment with Dr. Saini next week and appointment with Dr. Raman on 11/14.    11/11/2022: Clinic visit with Steve Hodges MD. Patient reports feeling well, denies any fevers or chills. Wound to his left leg appear worsening, now two distinct wounds that probe to bone and communicate. He has appointment with Dr. Raman next week. Counseled him to possibly consider amputation, reports that he has been discussing with Dr. Raman.   Sacrum remains largely unchanged. He is being evaluated for possible closure by Dr. Saini. I discussed case with Dr. Saini and he will evaluate wound care pictures to determine if he is candidate for surgical intervention.    11/18/2022: Clinic visit with Steve Hodges MD. Patient reports doing well. He has scheduled RICHARD and probable watchman device placement by interventional cardiology on Tuesday next week, he expects to be admitted through Wednesday morning. I called and coordinated with inpatient wound care team to have wound VAC and dressings changed while admitted. Sacral wound remains stable, measures roughly the same. Has not had follow up with Dr. Saini.   Patient was seen by Dr. Raman, as there does not appear to be acute infection he recommends continuing wound care but will reevaluate patient in 6 weeks and if no improvement may proceed with surgical I&D. Tissue quality remains poor and friable. Wound probes to bone.     12/2/2022 : Clinic visit with ADILSON Artuhr, FNP-BC, CWDINESHN, CFCN.   Anjum continues to feel well  overall.  He is now driving himself to appointments.  [facility], however [facility] will be seeing him, starting on Monday.  No significant changes to his wounds.  He has not heard anything from Dr. Saini about possible surgical intervention.    12/9/2022 : Clinic visit with ADILSON Arthur, FNPEGGY-BC, CWDINESHN, CFTRACI.   Anjum states he is feeling very well today.  His lateral leg wound i has deteriorated, area has increased,, tissue quality poor.  Increased granulation tissue noted to sacral wound.  There appears to be new granulation over bone.   Time spent today discussing possible surgical intervention.  I did find out that direct admission to [facility] might be possible, where Dr. Saini is medical director.  Plan would be for patient to undergo surgical debridement, possible veraflo placement, and antibiotics.  Anjum is open to this idea, but states he would not consider until after the holidays.    12/16/2022: Clinic visit with Steve Hodges MD. Patient denies any complaints. Tissue quality left leg remains poor and friable. Sacral pressure injury with increased slough today, slowly improving granulation tissue over bone.   I messaged Dr. Saini today, he will evaluate patient following the new year for possible reconstruction, we had discussed possible admission to [facility] in future.    12/30/2022: Clinic visit with Steve Hodges MD. Patient reports feeling in normal state of health. He reports that he was up in chair more frequently this weekend due to holidays. He reports that his VAC was not alarming, but had drainage from VAC dressing that was getting on clothes and no drainage in VAC canister. It appears that due to skin fold and sitting on VAC prevented proper moisture management. Despite using prevalon boots his left heel DTI has reopened. Patient has appointment with Dr. Raman next week, he does not have appointment with Dr. Saini yet.    1/13/2023:  Clinic visit with Steve Hodges MD. Patient had problems with obstruction of hutchins and was in the ED twice. He was diagnosed with UTI and prescribed Augmentin by ED. He reports otherwise feeling well. Left leg wound continues to be boggy and hypergranular, wound continues to probe to bone. Has appointment with Dr. Raman next week. Sacral pressure ulcer is smaller with increased bone coverage. VAC has been off for the past 2 weeks. As wound improving will have him return wound VAC. He has not heard from Dr. Saini.    1/20/2023: Clinic visit with Steve Hodges MD. Patient reports doing well, denies any fevers or chills, feels in normal state of health. Left leg wound friable with some bleeding, hypergranular. Used silver nitrate to cauterize hypergranulation tissue. Left heel pressure injury appears closed with thin fragile epithelium. Sacral pressure injury measures roughly the same, but there is better granulation tissue over bone with some increased slough. He cancelled appointment with Dr. Raman due to weather, was rescheduled for 1/25/2023.       REVIEW OF SYSTEMS:   Unchanged from previous wound clinic visit on 1/13/2023, except otherwise noted above.    PHYSICAL EXAMINATION:   /70   Pulse 60   Temp 36.5 °C (97.7 °F) (Temporal)   Resp 17   SpO2 94%   Physical Exam  Constitutional:       Appearance: He is obese.   Cardiovascular:      Rate and Rhythm: Bradycardia present.   Pulmonary:      Effort: Pulmonary effort is normal. No respiratory distress.      Breath sounds: No wheezing.   Skin:     Comments: Stage IV coccygeal pressure injury - Wound measures roughly the same, improved granulation tissue and coverage over bone, slight increase in slough.  No odor or purulence.    Full-thickness wound to left medial lower leg - No significant change in wound, Continues to have boggy friable hypergranular tissue that probes to bone.  Moderate serosanguineous drainage, no odor, no periwound erythema or  induration.      Stage III pressure injury left posterior heel - Appears resolved with thin fragile epithelium  Left posterior/lateral lower leg wounds- small opening    Refer to wound flowsheet and photos   Neurological:      Mental Status: He is alert and oriented to person, place, and time.       WOUND ASSESSMENT  Wound 04/22/22 Pressure Injury Sacrum POA stage 4 (Active)   Wound Image    01/20/23 1500   Site Assessment Red;Other (Comment);Yellow 01/20/23 1500   Periwound Assessment Scar tissue;Fragile 01/20/23 1500   Margins Unattached edges 01/20/23 1500   Drainage Amount Large 01/20/23 1500   Drainage Description Serous 01/20/23 1500   Treatments Cleansed;Provider debridement 01/20/23 1500   Wound Cleansing Puracyn [place] 01/20/23 1500   Periwound Protectant Skin Protectant Wipes to Periwound;Barrier Paste 01/20/23 1500   Dressing Cleansing/Solutions Not Applicable 01/20/23 1500   Dressing Options Hydrofiber Silver;Dry Gauze;Mepilex 01/20/23 1500   Dressing Changed Changed 01/20/23 1500   Dressing Change/Treatment Frequency Monday, Wednesday, Friday, and As Needed 01/20/23 1500   WOUND NURSE ONLY - Pressure Injury Stage 4 01/20/23 1500   Wound Length (cm) 1.6 cm 01/20/23 1500   Wound Width (cm) 1.8 cm 01/20/23 1500   Wound Depth (cm) 1.4 cm 01/20/23 1500   Wound Surface Area (cm^2) 2.88 cm^2 01/20/23 1500   Wound Volume (cm^3) 4.032 cm^3 01/20/23 1500   Post-Procedure Length (cm) 1.6 cm 01/20/23 1500   Post-Procedure Width (cm) 1.7 cm 01/20/23 1500   Post-Procedure Depth (cm) 1.4 cm 01/20/23 1500   Post-Procedure Surface Area (cm^2) 2.72 cm^2 01/20/23 1500   Post-Procedure Volume (cm^3) 3.808 cm^3 01/20/23 1500   Wound Healing % -68 01/20/23 1500   Tunneling (cm) 0 cm 01/20/23 1500   Tunneling Clock Position of Wound 9 11/04/22 1300   Undermining (cm) 1.7 cm 01/20/23 1500   Undermining of Wound, 1st Location From 7 o'clock;To 5 o'clock 01/20/23 1500   Wound Odor None 01/20/23 1500   Exposed Structures Bone  01/20/23 1500   Number of days: 274       Wound 07/22/22 Traumatic Leg Anterior Left (Active)   Wound Image   01/20/23 1500   Site Assessment Purple;Red;Closed Wound Edges 01/20/23 1500   Periwound Assessment Dry;Intact 01/20/23 1500   Drainage Amount None 01/20/23 1500   Treatments Cleansed 01/20/23 1500   Wound Cleansing Puracyn [place] 01/20/23 1500   Periwound Protectant Skin Protectant Wipes to Periwound 01/20/23 1500   Dressing Cleansing/Solutions Not Applicable 01/20/23 1500   Dressing Options Silicone Adhesive Foam;Tubigrip;Other (Comments) 01/20/23 1500   Dressing Changed Changed 01/20/23 1500   Dressing Change/Treatment Frequency Monday, Wednesday, Friday, and As Needed 01/20/23 1500   WOUND NURSE ONLY - Pressure Injury Stage DTPI 12/30/22 1400   Non-staged Wound Description Not applicable 01/20/23 1500   Wound Length (cm) 0 cm 01/20/23 1500   Wound Width (cm) 0 cm 01/20/23 1500   Wound Depth (cm) 0 cm 01/20/23 1500   Wound Surface Area (cm^2) 0 cm^2 01/20/23 1500   Wound Volume (cm^3) 0 cm^3 01/20/23 1500   Tunneling (cm) 0 cm 01/20/23 1500   Undermining (cm) 0 cm 01/20/23 1500   Wound Odor None 01/20/23 1500   Exposed Structures None 01/20/23 1500   Number of days: 183       Wound 07/29/22 Pressure Injury Leg Posterior;Lateral Left (Active)   Wound Image   01/20/23 1500   Site Assessment Purple;Red;Closed Wound Edges 01/20/23 1500   Periwound Assessment Dry;Intact 01/20/23 1500   Margins Attached edges;Undefined edges 01/13/23 1400   Drainage Amount None 01/20/23 1500   Drainage Description Serosanguineous 01/13/23 1400   Treatments Cleansed 01/20/23 1500   Wound Cleansing Puracyn [place] 01/20/23 1500   Periwound Protectant Skin Protectant Wipes to Periwound 01/20/23 1500   Dressing Cleansing/Solutions Not Applicable 01/20/23 1500   Dressing Options Mepilex;Tubigrip;Other (Comments) 01/20/23 1500   Dressing Changed Changed 01/20/23 1500   Dressing Change/Treatment Frequency Monday, Wednesday, Friday, and As  Needed 01/20/23 1500   WOUND NURSE ONLY - Pressure Injury Stage DTPI 01/13/23 1400   Wound Length (cm) 0 cm 01/20/23 1500   Wound Width (cm) 0 cm 01/20/23 1500   Wound Depth (cm) 0 cm 01/20/23 1500   Wound Surface Area (cm^2) 0 cm^2 01/20/23 1500   Wound Volume (cm^3) 0 cm^3 01/20/23 1500   Post-Procedure Length (cm) 1 cm 01/13/23 1400   Post-Procedure Width (cm) 1.5 cm 01/13/23 1400   Post-Procedure Depth (cm) 0.1 cm 01/13/23 1400   Post-Procedure Surface Area (cm^2) 1.5 cm^2 01/13/23 1400   Post-Procedure Volume (cm^3) 0.15 cm^3 01/13/23 1400   Wound Healing % 100 01/20/23 1500   Tunneling (cm) 0 cm 01/20/23 1500   Undermining (cm) 0 cm 01/20/23 1500   Wound Odor None 01/20/23 1500   Exposed Structures None 01/20/23 1500   Number of days: 176       Wound 10/21/22 Pressure Injury Left Posterior Heel stage 3 (Active)   Wound Image   01/20/23 1500   Site Assessment Fragile;Purple;Red;Closed Wound Edges 01/20/23 1500   Periwound Assessment Fragile;Scar tissue 01/20/23 1500   Margins Attached edges 01/13/23 1400   Drainage Amount None 01/20/23 1500   Drainage Description Serosanguineous 01/13/23 1400   Treatments Cleansed 01/20/23 1500   Wound Cleansing Puracyn [place] 01/20/23 1500   Periwound Protectant Skin Protectant Wipes to Periwound 01/20/23 1500   Dressing Cleansing/Solutions Not Applicable 01/20/23 1500   Dressing Options Mepilex Heel;Tubigrip;Other (Comments) 01/20/23 1500   Dressing Changed Changed 01/20/23 1500   Dressing Status Clean;Dry;Intact 12/30/22 1400   Dressing Change/Treatment Frequency Monday, Wednesday, Friday, and As Needed 01/20/23 1500   WOUND NURSE ONLY - Pressure Injury Stage 3 01/13/23 1400   Non-staged Wound Description Not applicable 11/18/22 1300   Wound Length (cm) 0 cm 01/20/23 1500   Wound Width (cm) 0 cm 01/20/23 1500   Wound Depth (cm) 0 cm 01/20/23 1500   Wound Surface Area (cm^2) 0 cm^2 01/20/23 1500   Wound Volume (cm^3) 0 cm^3 01/20/23 1500   Post-Procedure Length (cm) 3 cm  01/13/23 1400   Post-Procedure Width (cm) 3 cm 01/13/23 1400   Post-Procedure Depth (cm) 0.1 cm 01/13/23 1400   Post-Procedure Surface Area (cm^2) 9 cm^2 01/13/23 1400   Post-Procedure Volume (cm^3) 0.9 cm^3 01/13/23 1400   Wound Healing % 100 01/20/23 1500   Tunneling (cm) 0 cm 01/20/23 1500   Undermining (cm) 0 cm 01/20/23 1500   Wound Odor None 01/20/23 1500   Exposed Structures None 01/20/23 1500   Number of days: 92       Wound 11/11/22 LLE Medial (anterior and posterior merged) (Active)   Wound Image    01/20/23 1500   Site Assessment Red;Boggy;Fragile;Other (Comment) 01/20/23 1500   Periwound Assessment Fragile;Scar tissue;Red 01/20/23 1500   Margins Unattached edges;Attached edges 01/20/23 1500   Drainage Amount Moderate 01/20/23 1500   Drainage Description Serosanguineous 01/20/23 1500   Treatments Cleansed;Provider debridement 01/20/23 1500   Wound Cleansing Puracyn [place] 01/20/23 1500   Periwound Protectant Skin Protectant Wipes to Periwound;Barrier Paste 01/20/23 1500   Dressing Cleansing/Solutions Not Applicable 01/20/23 1500   Dressing Options Adaptic;Hydrofera Blue Ready;Mepilex;Tubigrip;Other (Comments) 01/20/23 1500   Dressing Changed Changed 01/20/23 1500   Dressing Change/Treatment Frequency Monday, Wednesday, Friday, and As Needed 01/20/23 1500   Non-staged Wound Description Full thickness 01/20/23 1500   Wound Length (cm) 2.5 cm 01/20/23 1500   Wound Width (cm) 3.8 cm 01/20/23 1500   Wound Depth (cm) 0.2 cm 01/20/23 1500   Wound Surface Area (cm^2) 9.5 cm^2 01/20/23 1500   Wound Volume (cm^3) 1.9 cm^3 01/20/23 1500   Post-Procedure Length (cm) 0.2 cm 01/20/23 1500   Post-Procedure Width (cm) 3.5 cm 01/13/23 1400   Post-Procedure Depth (cm) 1 cm 12/30/22 1400   Post-Procedure Surface Area (cm^2) 8.75 cm^2 01/13/23 1400   Post-Procedure Volume (cm^3) 8.75 cm^3 12/30/22 1400   Wound Healing % -183 01/20/23 1500   Tunneling (cm) 0 cm 01/20/23 1500   Tunneling Clock Position of Wound 9 11/11/22 1300    Undermining (cm) 0 cm 01/20/23 1500   Wound Odor None 01/20/23 1500   Exposed Structures Bone 01/20/23 1500   Number of days: 71       PROCEDURE:  Debridement of sacral wound  -Lidocaine declined, he is insensate  -Curette used to debride wound bed.  Excisional debridement was performed to remove devitalized tissue until healthy, bleeding tissue was visualized.   Entire surface of wound, 2.72 cm2 debrided.  Tissue debrided into the muscle / fascia layer  -Bleeding controlled with manual pressure.    -Wound care completed by wound RN, refer to flowsheet  -Patient tolerated the procedure well, without c/o pain or discomfort.    PROCEDURE: Chemical cautery of hypergranulation tissue left lower extremity wound  - Friable boggy hypergranular tissue noted  - Used silver nitrate to chemically cauterize tissue  - Some bleeding noted, treated with silver nitrate and brief pressure dressing        BIOLOGIC LOG  5/20/2022-first application.  PRODUCT: EpiCord 2 x 3 cm . PRODUCT #[id number]; EXPIRES: 2026-12-01 5/27/2022- second application.  PRODUCT: EpiCordEX 2 x 3 cm . PRODUCT #[id number]; EXPIRES: 2026-09-01    6/3/2022- third application.  PRODUCT: EpiCordEX 2 x 3 cm . PRODUCT #[id number]; EXPIRES: 2026-10-01    6/10/2022- fourth application.  PRODUCT: EpiCordEX 2 x 3 cm . PRODUCT #[id number]; EXPIRES: 2026-09-01 6/17/2022- fifth application.  PRODUCT: EpiCordEX 2 x 3 cm . PRODUCT #EX [id number]; EXPIRES: 2027-02-01 6/24/2022- sixth application.  PRODUCT: EpiCordEX 2 x 3 cm . PRODUCT #EX [id number]; EXPIRES: 2027-02-01 07/01/22: Seventh application.  Product: Epicort Dx 2.0 x 3.0 cm reorder number [id number]; product number [id number]; expires 2027-02-01 7/22/2022- eighth application.  PRODUCT: EpiCord 2 x 3 cm, reorder #[id number]. PRODUCT #[id number]; EXPIRES: 2027-02-01      Pertinent Labs and Diagnostics:    Labs:     A1c: No results found for: HBA1C      [facility] results, 7/1/2022 (under media tab)    CRP 13    ESR 31      IMAGING:     10/3/2022-CT of pelvis with contrast  IMPRESSION:     1.  Posterior soft tissue sacral wound and 1.5 x 3.7 cm abscess.   2.  Progressive destruction of the distal sacrum and proximal coccyx. Sclerosis and irregularity of the distal sacrum consistent with osteomyelitis.   3.  Old wound within the soft tissues posterior to the distal left SI joint with possible fistulous communication.    10/3/2022-CT of tib-fib with and without contrast, with reconstruction  IMPRESSION:     1.  Old fractures of the left tibia and fibula with extensive callus formation and bony bridging as well as extensive dystrophic calcifications. Similar to previous.   2.  Distal medial soft tissue wounds with ill-defined superficial in the soft tissue thickening and fluid collections suggestive of phlegmon. No organized abscess identified.   3.  No definite osteomyelitis.   4.  Arthritic changes.        VASCULAR STUDIES: No results found.    LAST  WOUND CULTURE:   Lab Results   Component Value Date/Time    CULTRSULT - (A) 01/07/2023 11:54 AM    CULTRSULT Proteus mirabilis  46,000 cfu/mL   (A) 01/07/2023 11:54 AM    CULTRSULT Candida tropicalis  6,000 cfu/mL   (A) 01/07/2023 11:54 AM    CULTRSULT Enterococcus faecalis  100 cfu/mL   (A) 01/07/2023 11:54 AM          ASSESSMENT AND PLAN:     1. Sacral decubitus ulcer, stage IV ([facility])  Comments: Ulcer first noted in early April 2022 as small open area which quickly enlarged.  This ulcer is present distal from previous sacral ulcer which healed after surgery in January.  Patient has history of flap reconstruction x2 to this area.  CT shows progressive destruction of distal sacrum and proximal coccyx.    1/20/2023: Wound without change in measurement, improved coverage over bone. Increased slough in wound bed  - Excisional debridement was performed in clinic, medically necessary to promote wound healing.  -Patient is to  return to clinic weekly for assessment and debridement   - Wound has improved without wound VAC, he sent device back to   - Patient does not currently have follow up with Dr. Saini. If wound fails to improve can consider re-establishing with Dr. Saini for evaluation for coverage options.    Wound care: Hydrofiber silver strip, hydrofiber silver, Mepilex    2. Postoperative wound dehiscence, subsequent encounter  Comments: On 4/26 patient underwent irrigation and debridement of multiple compartments of the left lower extremity states for pressure injury, with bone excision, and complex closure of chronic wound using biologic skin substitute.  During postop visit in surgeons office on 5/11, surgical site was noted to be dehisced.  Patient was referred to wound clinic for management.    1/20/2023: Wound tissue remains poor, friable and boggy and wound probes to bone  - Chemical cautery of hypergranulation tissue today in clinic  -Patient to return to clinic weekly for assessment and debridement as needed  -Home health to change dressing 1-2 times per week in between clinic visits   -Patient to follow-up with Dr. Raman next week  -Consider resuming biologic and/or VAC, do not think that tissue quality is sufficient for biologic.  -I suspect pressure may be an issue still.  Patient states he is wearing Prevalon boot at all times    Wound care: Hydrofera Blue, Mepilex    3. Deep tissue injury  4. Pressure injury of left leg, unstageable ([facility])  Comments: DTI to posterior left lower leg first observed in clinic 7/29/2022.  Patient does not know how it started, had been wearing heel float boot consistently.    1/20/2023: Slight opening, appears improving  -Continue to monitor for any deterioration in tissue quality  -Patient is currently wearing Prevalon boots to help prevent pressure injuries to bilateral lower extremities    Wound care: Mepilex, Tubigrip D    5. Pressure injury of left heel, stage 3  ([facility])  Comments: First noted in clinic on 10/21/2022, originally noted to be stage II    1/13/2023: Wounds have resolved with thin layer of epithelium  - No debridement required  - Patient reports wearing Prevalon boots 24 hours a day. He is clear having pressure to heel and recommend offloading heels off mattress.     Wound Care: Heel Mepilex to reduce pressure and friction    6. Quadriplegia, C5-C7, complete ([facility])  Comments: Complicating factor.  Impaired mobility and sensation  -Patient is still spending 7-8 hours/day up in his wheelchair, though he does have appropriate offloading cushion, and knows to reposition frequently.  Wears heel float boots bilaterally at all times      Please note that this note may have been created using voice recognition software. I have worked with technical experts from [employer] to optimize the interface.  I have made every reasonable attempt to correct obvious errors, but there may be errors of grammar and possibly content that I did not discover before finalizing the note.

## 2023-01-20 NOTE — PATIENT INSTRUCTIONS
-Keep your wound dressing clean, dry, and intact.    -Change your dressing if it becomes soiled, soaked, or falls off, OR call .    -Should you experience any significant changes in your wound(s), such as infection (redness, swelling, localized heat, increased pain, fever > 101 F, chills) or have any questions regarding your home care instructions, please contact the wound center at [phone]. If after hours, contact your primary care physician or go to the hospital emergency room.

## 2023-01-23 ENCOUNTER — APPOINTMENT (OUTPATIENT)
Dept: RADIOLOGY | Facility: MEDICAL CENTER | Age: 73
DRG: 695 | End: 2023-01-23
Payer: MEDICARE

## 2023-01-23 ENCOUNTER — HOSPITAL ENCOUNTER (INPATIENT)
Facility: MEDICAL CENTER | Age: 73
LOS: 2 days | DRG: 695 | End: 2023-01-25
Attending: EMERGENCY MEDICINE | Admitting: STUDENT IN AN ORGANIZED HEALTH CARE EDUCATION/TRAINING PROGRAM
Payer: MEDICARE

## 2023-01-23 ENCOUNTER — APPOINTMENT (OUTPATIENT)
Dept: RADIOLOGY | Facility: MEDICAL CENTER | Age: 73
DRG: 695 | End: 2023-01-23
Attending: EMERGENCY MEDICINE
Payer: MEDICARE

## 2023-01-23 DIAGNOSIS — R31.9 HEMATURIA, UNSPECIFIED TYPE: ICD-10-CM

## 2023-01-23 DIAGNOSIS — L98.429 SKIN ULCER OF SACRUM, UNSPECIFIED ULCER STAGE (HCC): ICD-10-CM

## 2023-01-23 DIAGNOSIS — L97.923: ICD-10-CM

## 2023-01-23 DIAGNOSIS — L89.524 PRESSURE ULCER OF LEFT ANKLE, STAGE 4 (HCC): ICD-10-CM

## 2023-01-23 PROBLEM — R31.0 CLOT HEMATURIA: Status: ACTIVE | Noted: 2023-01-23

## 2023-01-23 LAB
ALBUMIN SERPL BCP-MCNC: 3.8 G/DL (ref 3.2–4.9)
ALBUMIN/GLOB SERPL: 1.3 G/DL
ALP SERPL-CCNC: 69 U/L (ref 30–99)
ALT SERPL-CCNC: 7 U/L (ref 2–50)
AMORPH CRY #/AREA URNS HPF: PRESENT /HPF
ANION GAP SERPL CALC-SCNC: 9 MMOL/L (ref 7–16)
APPEARANCE UR: ABNORMAL
AST SERPL-CCNC: 10 U/L (ref 12–45)
BACTERIA #/AREA URNS HPF: ABNORMAL /HPF
BASOPHILS # BLD AUTO: 0.4 % (ref 0–1.8)
BASOPHILS # BLD: 0.02 K/UL (ref 0–0.12)
BILIRUB SERPL-MCNC: 0.6 MG/DL (ref 0.1–1.5)
BUN SERPL-MCNC: 14 MG/DL (ref 8–22)
CALCIUM ALBUM COR SERPL-MCNC: 9.3 MG/DL (ref 8.5–10.5)
CALCIUM SERPL-MCNC: 9.1 MG/DL (ref 8.5–10.5)
CHLORIDE SERPL-SCNC: 108 MMOL/L (ref 96–112)
CO2 SERPL-SCNC: 24 MMOL/L (ref 20–33)
COLOR UR: ABNORMAL
CREAT SERPL-MCNC: 0.49 MG/DL (ref 0.5–1.4)
EOSINOPHIL # BLD AUTO: 0.16 K/UL (ref 0–0.51)
EOSINOPHIL NFR BLD: 3.2 % (ref 0–6.9)
EPI CELLS #/AREA URNS HPF: ABNORMAL /HPF
ERYTHROCYTE [DISTWIDTH] IN BLOOD BY AUTOMATED COUNT: 51.2 FL (ref 35.9–50)
GFR SERPLBLD CREATININE-BSD FMLA CKD-EPI: 109 ML/MIN/1.73 M 2
GLOBULIN SER CALC-MCNC: 2.9 G/DL (ref 1.9–3.5)
GLUCOSE SERPL-MCNC: 109 MG/DL (ref 65–99)
HCT VFR BLD AUTO: 41 % (ref 42–52)
HGB BLD-MCNC: 13.9 G/DL (ref 14–18)
HYALINE CASTS #/AREA URNS LPF: ABNORMAL /LPF
IMM GRANULOCYTES # BLD AUTO: 0.03 K/UL (ref 0–0.11)
IMM GRANULOCYTES NFR BLD AUTO: 0.6 % (ref 0–0.9)
LYMPHOCYTES # BLD AUTO: 1.23 K/UL (ref 1–4.8)
LYMPHOCYTES NFR BLD: 24.4 % (ref 22–41)
MCH RBC QN AUTO: 34.8 PG (ref 27–33)
MCHC RBC AUTO-ENTMCNC: 33.9 G/DL (ref 33.7–35.3)
MCV RBC AUTO: 102.8 FL (ref 81.4–97.8)
MICRO URNS: ABNORMAL
MONOCYTES # BLD AUTO: 0.47 K/UL (ref 0–0.85)
MONOCYTES NFR BLD AUTO: 9.3 % (ref 0–13.4)
NEUTROPHILS # BLD AUTO: 3.13 K/UL (ref 1.82–7.42)
NEUTROPHILS NFR BLD: 62.1 % (ref 44–72)
NRBC # BLD AUTO: 0 K/UL
NRBC BLD-RTO: 0 /100 WBC
PLATELET # BLD AUTO: 144 K/UL (ref 164–446)
PMV BLD AUTO: 9 FL (ref 9–12.9)
POTASSIUM SERPL-SCNC: 4.1 MMOL/L (ref 3.6–5.5)
PROT SERPL-MCNC: 6.7 G/DL (ref 6–8.2)
RBC # BLD AUTO: 3.99 M/UL (ref 4.7–6.1)
RBC # URNS HPF: >150 /HPF
SODIUM SERPL-SCNC: 141 MMOL/L (ref 135–145)
WBC # BLD AUTO: 5 K/UL (ref 4.8–10.8)
WBC #/AREA URNS HPF: ABNORMAL /HPF

## 2023-01-23 PROCEDURE — 74176 CT ABD & PELVIS W/O CONTRAST: CPT

## 2023-01-23 PROCEDURE — 51700 IRRIGATION OF BLADDER: CPT

## 2023-01-23 PROCEDURE — 303105 HCHG CATHETER EXTRA

## 2023-01-23 PROCEDURE — 302098 PASTE RING (FLAT): Performed by: STUDENT IN AN ORGANIZED HEALTH CARE EDUCATION/TRAINING PROGRAM

## 2023-01-23 PROCEDURE — 85025 COMPLETE CBC W/AUTO DIFF WBC: CPT

## 2023-01-23 PROCEDURE — 99223 1ST HOSP IP/OBS HIGH 75: CPT | Mod: AI | Performed by: STUDENT IN AN ORGANIZED HEALTH CARE EDUCATION/TRAINING PROGRAM

## 2023-01-23 PROCEDURE — 87086 URINE CULTURE/COLONY COUNT: CPT

## 2023-01-23 PROCEDURE — 81001 URINALYSIS AUTO W/SCOPE: CPT

## 2023-01-23 PROCEDURE — A9270 NON-COVERED ITEM OR SERVICE: HCPCS | Performed by: STUDENT IN AN ORGANIZED HEALTH CARE EDUCATION/TRAINING PROGRAM

## 2023-01-23 PROCEDURE — 99285 EMERGENCY DEPT VISIT HI MDM: CPT

## 2023-01-23 PROCEDURE — 51702 INSERT TEMP BLADDER CATH: CPT

## 2023-01-23 PROCEDURE — 36415 COLL VENOUS BLD VENIPUNCTURE: CPT

## 2023-01-23 PROCEDURE — 99221 1ST HOSP IP/OBS SF/LOW 40: CPT | Performed by: INTERNAL MEDICINE

## 2023-01-23 PROCEDURE — 770006 HCHG ROOM/CARE - MED/SURG/GYN SEMI*

## 2023-01-23 PROCEDURE — 80053 COMPREHEN METABOLIC PANEL: CPT

## 2023-01-23 PROCEDURE — 700102 HCHG RX REV CODE 250 W/ 637 OVERRIDE(OP): Performed by: STUDENT IN AN ORGANIZED HEALTH CARE EDUCATION/TRAINING PROGRAM

## 2023-01-23 RX ORDER — SIMETHICONE 125 MG
125 TABLET,CHEWABLE ORAL EVERY 6 HOURS PRN
COMMUNITY
End: 2023-02-26

## 2023-01-23 RX ORDER — ACETAMINOPHEN 325 MG/1
650 TABLET ORAL EVERY 6 HOURS PRN
Status: DISCONTINUED | OUTPATIENT
Start: 2023-01-23 | End: 2023-01-25 | Stop reason: HOSPADM

## 2023-01-23 RX ORDER — CHOLECALCIFEROL (VITAMIN D3) 125 MCG
10 CAPSULE ORAL
Status: DISCONTINUED | OUTPATIENT
Start: 2023-01-23 | End: 2023-01-25 | Stop reason: HOSPADM

## 2023-01-23 RX ORDER — AMLODIPINE BESYLATE 10 MG/1
10 TABLET ORAL EVERY MORNING
Status: DISCONTINUED | OUTPATIENT
Start: 2023-01-24 | End: 2023-01-25 | Stop reason: HOSPADM

## 2023-01-23 RX ORDER — VITAMIN B COMPLEX
2000 TABLET ORAL DAILY
Status: DISCONTINUED | OUTPATIENT
Start: 2023-01-24 | End: 2023-01-25 | Stop reason: HOSPADM

## 2023-01-23 RX ORDER — POLYETHYLENE GLYCOL 3350 17 G/17G
17 POWDER, FOR SOLUTION ORAL DAILY
COMMUNITY

## 2023-01-23 RX ORDER — HYDRALAZINE HYDROCHLORIDE 20 MG/ML
10 INJECTION INTRAMUSCULAR; INTRAVENOUS EVERY 4 HOURS PRN
Status: DISCONTINUED | OUTPATIENT
Start: 2023-01-23 | End: 2023-01-25 | Stop reason: HOSPADM

## 2023-01-23 RX ORDER — BACLOFEN 10 MG/1
20 TABLET ORAL 4 TIMES DAILY
Status: DISCONTINUED | OUTPATIENT
Start: 2023-01-23 | End: 2023-01-25 | Stop reason: HOSPADM

## 2023-01-23 RX ORDER — ASCORBIC ACID 500 MG
1000 TABLET ORAL EVERY MORNING
Status: DISCONTINUED | OUTPATIENT
Start: 2023-01-24 | End: 2023-01-25 | Stop reason: HOSPADM

## 2023-01-23 RX ORDER — FERROUS SULFATE 325(65) MG
325 TABLET ORAL
Status: DISCONTINUED | OUTPATIENT
Start: 2023-01-24 | End: 2023-01-25 | Stop reason: HOSPADM

## 2023-01-23 RX ORDER — LOSARTAN POTASSIUM 50 MG/1
50 TABLET ORAL
Status: DISCONTINUED | OUTPATIENT
Start: 2023-01-23 | End: 2023-01-25 | Stop reason: HOSPADM

## 2023-01-23 RX ORDER — BISACODYL 10 MG
10 SUPPOSITORY, RECTAL RECTAL
Status: DISCONTINUED | OUTPATIENT
Start: 2023-01-23 | End: 2023-01-25 | Stop reason: HOSPADM

## 2023-01-23 RX ORDER — POLYETHYLENE GLYCOL 3350 17 G/17G
1 POWDER, FOR SOLUTION ORAL
Status: DISCONTINUED | OUTPATIENT
Start: 2023-01-23 | End: 2023-01-25 | Stop reason: HOSPADM

## 2023-01-23 RX ORDER — LANOLIN ALCOHOL/MO/W.PET/CERES
400 CREAM (GRAM) TOPICAL EVERY MORNING
Status: DISCONTINUED | OUTPATIENT
Start: 2023-01-24 | End: 2023-01-25 | Stop reason: HOSPADM

## 2023-01-23 RX ORDER — AMOXICILLIN 250 MG
2 CAPSULE ORAL 2 TIMES DAILY
Status: DISCONTINUED | OUTPATIENT
Start: 2023-01-23 | End: 2023-01-25 | Stop reason: HOSPADM

## 2023-01-23 RX ADMIN — SENNOSIDES AND DOCUSATE SODIUM 2 TABLET: 50; 8.6 TABLET ORAL at 17:49

## 2023-01-23 RX ADMIN — Medication 10 MG: at 21:20

## 2023-01-23 RX ADMIN — LOSARTAN POTASSIUM 50 MG: 50 TABLET, FILM COATED ORAL at 21:20

## 2023-01-23 RX ADMIN — BACLOFEN 20 MG: 10 TABLET ORAL at 17:49

## 2023-01-23 RX ADMIN — BACLOFEN 20 MG: 10 TABLET ORAL at 21:20

## 2023-01-23 ASSESSMENT — ENCOUNTER SYMPTOMS
NERVOUS/ANXIOUS: 0
NAUSEA: 0
FEVER: 0
CHILLS: 0
SORE THROAT: 0
SHORTNESS OF BREATH: 0
VOMITING: 0
HEADACHES: 0
BLURRED VISION: 0
DIARRHEA: 0

## 2023-01-23 ASSESSMENT — LIFESTYLE VARIABLES: SUBSTANCE_ABUSE: 0

## 2023-01-23 ASSESSMENT — FIBROSIS 4 INDEX: FIB4 SCORE: 2.96

## 2023-01-23 ASSESSMENT — PAIN DESCRIPTION - PAIN TYPE: TYPE: ACUTE PAIN

## 2023-01-23 NOTE — CONSULTS
Reason for Consult:  Asked by Dr Agatha Gonazles M.D. and Amber Cummins to see this patient with hematuria on chronic anticoagulation  Patient's PCP: KATE Pretty    CC:   Chief Complaint   Patient presents with    Blood in Urine       HPI: This is a pleasant 72-year-old with a loop recorder for palpitations found to have intermittent atrial flutter and was started on chronic anticoagulation last fall with successful watchman device placement November and was scheduled to follow-up RICHARD tomorrow to see if stop his anticoagulation he is also been on baby aspirin.  He presents with hematuria with chronic indwelling urinary catheter and is on bladder irrigations with improving hematuria.  His last dose of Xarelto was last evening    Medications / Drug list prior to admission:  No current facility-administered medications on file prior to encounter.     Current Outpatient Medications on File Prior to Encounter   Medication Sig Dispense Refill    polyethylene glycol/lytes (MIRALAX) 17 g Pack Take 17 g by mouth 1 time a day as needed. Indications: Constipation      simethicone (MYLICON) 125 MG chewable tablet Chew 125 mg every 6 hours as needed for Flatulence.      baclofen (LIORESAL) 20 MG tablet Take 1 Tablet by mouth 4 times a day. 120 Tablet 0    amLODIPine (NORVASC) 10 MG Tab Take 1 Tablet by mouth every morning. Hold if BP <100/64. 30 Tablet 5    losartan (COZAAR) 50 MG Tab Take 1 Tablet by mouth at bedtime. Hold if BP <100/64. 30 Tablet 5    aspirin EC 81 MG EC tablet Take 1 Tablet by mouth every day. 30 Tablet 6    rivaroxaban (XARELTO) 20 MG Tab tablet Take 1 Tablet by mouth with dinner. 90 Tablet 3    Zinc 50 MG Tab Take 1 Tablet by mouth every morning.      Cholecalciferol (VITAMIN D3) 2000 UNIT Cap Take 1 Capsule by mouth every morning.      Magnesium 400 MG Tab Take 400 mg by mouth every morning.      Ascorbic Acid (VITAMIN C) 1000 MG Tab Take 1 Tablet by mouth every morning.      melatonin 5 mg  Tab Take 10 mg by mouth at bedtime.      Probiotic Product (PROBIOTIC PO) Take 1 Capsule by mouth every morning.      sennosides (SENOKOT) 8.6 MG Tab Take 8.6 mg by mouth 2 times a day.      ferrous sulfate 325 (65 Fe) MG tablet Take 1 Tablet by mouth 2 times a day.      docusate sodium (COLACE) 100 MG Cap Take 100 mg by mouth every day.         Current list of administered Medications:    Current Facility-Administered Medications:     senna-docusate (PERICOLACE or SENOKOT S) 8.6-50 MG per tablet 2 Tablet, 2 Tablet, Oral, BID **AND** polyethylene glycol/lytes (MIRALAX) PACKET 1 Packet, 1 Packet, Oral, QDAY PRN **AND** magnesium hydroxide (MILK OF MAGNESIA) suspension 30 mL, 30 mL, Oral, QDAY PRN **AND** bisacodyl (DULCOLAX) suppository 10 mg, 10 mg, Rectal, QDAY PRN, Agatha Gonzales M.D.    acetaminophen (Tylenol) tablet 650 mg, 650 mg, Oral, Q6HRS PRN, Agatha Gonzales M.D.    hydrALAZINE (APRESOLINE) injection 10 mg, 10 mg, Intravenous, Q4HRS PRN, Agatha Gonzales M.D.    [START ON 1/24/2023] amLODIPine (NORVASC) tablet 10 mg, 10 mg, Oral, QAM, Agatha Gonzales M.D.    [START ON 1/24/2023] ascorbic acid (Vitamin C) tablet 1,000 mg, 1,000 mg, Oral, QAM, Agatha Gonzales M.D.    baclofen (LIORESAL) tablet 20 mg, 20 mg, Oral, 4X/DAY, Agatha Gonzales M.D.    [START ON 1/24/2023] vitamin D3 (cholecalciferol) tablet 2,000 Units, 2,000 Units, Oral, DAILY, Agatha Gonzales M.D.    [START ON 1/24/2023] ferrous sulfate tablet 325 mg, 325 mg, Oral, QDAY with Breakfast, Agatha Gonzales M.D.    losartan (COZAAR) tablet 50 mg, 50 mg, Oral, QHS, DORY Yeager.D.    [START ON 1/24/2023] magnesium oxide tablet 400 mg, 400 mg, Oral, Agatha MCINTOSH M.D.    melatonin tablet 10 mg, 10 mg, Oral, Agatha ABRAHAM M.D.    Current Outpatient Medications:     polyethylene glycol/lytes (MIRALAX) 17 g Pack, Take 17 g by mouth 1 time a day as needed. Indications: Constipation, Disp: , Rfl:     simethicone  "(MYLICON) 125 MG chewable tablet, Chew 125 mg every 6 hours as needed for Flatulence., Disp: , Rfl:     baclofen (LIORESAL) 20 MG tablet, Take 1 Tablet by mouth 4 times a day., Disp: 120 Tablet, Rfl: 0    amLODIPine (NORVASC) 10 MG Tab, Take 1 Tablet by mouth every morning. Hold if BP <100/64., Disp: 30 Tablet, Rfl: 5    losartan (COZAAR) 50 MG Tab, Take 1 Tablet by mouth at bedtime. Hold if BP <100/64., Disp: 30 Tablet, Rfl: 5    aspirin EC 81 MG EC tablet, Take 1 Tablet by mouth every day., Disp: 30 Tablet, Rfl: 6    rivaroxaban (XARELTO) 20 MG Tab tablet, Take 1 Tablet by mouth with dinner., Disp: 90 Tablet, Rfl: 3    Zinc 50 MG Tab, Take 1 Tablet by mouth every morning., Disp: , Rfl:     Cholecalciferol (VITAMIN D3) 2000 UNIT Cap, Take 1 Capsule by mouth every morning., Disp: , Rfl:     Magnesium 400 MG Tab, Take 400 mg by mouth every morning., Disp: , Rfl:     Ascorbic Acid (VITAMIN C) 1000 MG Tab, Take 1 Tablet by mouth every morning., Disp: , Rfl:     melatonin 5 mg Tab, Take 10 mg by mouth at bedtime., Disp: , Rfl:     Probiotic Product (PROBIOTIC PO), Take 1 Capsule by mouth every morning., Disp: , Rfl:     sennosides (SENOKOT) 8.6 MG Tab, Take 8.6 mg by mouth 2 times a day., Disp: , Rfl:     ferrous sulfate 325 (65 Fe) MG tablet, Take 1 Tablet by mouth 2 times a day., Disp: , Rfl:     docusate sodium (COLACE) 100 MG Cap, Take 100 mg by mouth every day., Disp: , Rfl:     Past Medical History:   Diagnosis Date    A-fib (HCC)     Acute cystitis without hematuria 10/23/2021    Arrhythmia     Atrial flutter (HCC)     Blood clotting disorder (HCC)     Patient is on Xarelto    Bowel habit changes     Colostomy    GERD (gastroesophageal reflux disease)     Hypertension     Kidney stones     Neurogenic bladder     S/P cath    Open wound 11/18/2022    sacrum with wound vac, left ankle, [facility]    Quadriplegia, C5-C7 complete (HCC)     patient reports \" incomplete quad\"    Suprapubic catheter (HCC)  "       Past Surgical History:   Procedure Laterality Date    IRRIGATION & DEBRIDEMENT GENERAL Left 04/26/2022    Procedure: IRRIGATION AND DEBRIDEMENT, WOUND - LEG;  Surgeon: Eric Raman M.D.;  Location: SURGERY [facility];  Service: Orthopedics    WOUND CLOSURE NEURO Left 04/26/2022    Procedure: CLOSURE, WOUND;  Surgeon: Eric Raman M.D.;  Location: [facility];  Service: Orthopedics    ORTHOPEDIC OSTEOTOMY Left 04/26/2022    Procedure: OSTECTOMY;  Surgeon: Eric Raman M.D.;  Location: [facility];  Service: Orthopedics    INCISION AND DRAINAGE ORTHOPEDIC Left 01/27/2022    Procedure: INCISION AND DRAINAGE, WOUND, BY ORTHOPEDICS;  Surgeon: Erasmo Stewart M.D.;  Location: [facility];  Service: Orthopedics    BONE BIOPSY Left 01/27/2022    Procedure: BIOPSY, BONE;  Surgeon: Erasmo Stewart M.D.;  Location: [facility];  Service: Orthopedics    INCISION AND DRAINAGE ORTHOPEDIC  01/22/2022    Procedure: INCISION AND DRAINAGE, WOUND, BY ORTHOPEDICS;  Surgeon: Erasmo Stewart M.D.;  Location: [facility];  Service: Orthopedics    MN CYSTOSCOPY,INSERT URETERAL STENT Left 01/04/2022    Procedure: CYSTOSCOPY, WITH URETERAL STENT INSERTION;  Surgeon: Osvaldo Baltazar M.D.;  Location: [facility];  Service: Urology    MN CYSTO/URETERO/PYELOSCOPY, DX Left 01/04/2022    Procedure: URETEROSCOPY;  Surgeon: Osvaldo Baltazar M.D.;  Location: [facility];  Service: Urology    LASERTRIPSY N/A 01/04/2022    Procedure: LITHOTRIPSY, USING LASER;  Surgeon: Osvaldo Baltazar M.D.;  Location: [facility];  Service: Urology    MN CYSTOSCOPY,INSERT URETERAL STENT Left 12/16/2021    Procedure: CYSTOSCOPY, WITH URETERAL STENT INSERTION;  Surgeon: Aly Bowen M.D.;  Location: [facility];  Service: Urology    MN CYSTO/URETERO/PYELOSCOPY, DX Left 12/16/2021    Procedure: URETEROSCOPY;  Surgeon: Aly Bowen M.D.;  Location:  "SURGERY [facility];  Service: Urology    LASERTRIPSY Left 2021    Procedure: LITHOTRIPSY, USING LASER;  Surgeon: Aly Bowen M.D.;  Location: [facility];  Service: Urology    IRRIGATION & DEBRIDEMENT GENERAL  2020    Procedure: IRRIGATION AND DEBRIDEMENT, WOUND SACRAL ULCER;  Surgeon: Matt Cummins M.D.;  Location: [facility];  Service: Plastics    ULCER DEBRIDEMENT N/A 2019    Procedure: debridement of Sacral grade 4 ulcer - W/BONE BIOPSY, 3 liter wash out. bilateral sliding gluteal myocutaneous flap advancement;  Surgeon: Amadeo Moon M.D.;  Location: [facility];  Service: Plastics    FLAP CLOSURE  2019    Procedure: CLOSURE, FLAP - MUSCLE;  Surgeon: Amadeo Moon M.D.;  Location: [facility];  Service: Plastics    COLOSTOMY N/A 2019    Procedure: CREATION, COLOSTOMY -  placement;  Surgeon: Elías Hannah M.D.;  Location: [facility];  Service: General    COLOSTOMY      COLOSTOMY TAKEDOWN      HERNIA REPAIR      ORIF, ANKLE      PERCUTANEOUOSPINNING LOWER EXTREMITY         Family History   Problem Relation Age of Onset    Heart Disease Father      Patient family history was personally reviewed, no pertinent family history to current presentation    Social History     Tobacco Use    Smoking status: Former     Packs/day: 1.00     Years: 10.00     Pack years: 10.00     Types: Cigarettes     Start date: 1967     Quit date: 1977     Years since quittin.0    Smokeless tobacco: Never   Vaping Use    Vaping Use: Never used   Substance Use Topics    Alcohol use: Yes     Alcohol/week: 4.2 oz     Types: 7 Standard drinks or equivalent per week     Comment: Nightly    Drug use: No       ALLERGIES:  Allergies   Allergen Reactions    Sulfa Drugs Rash     Rash, developed this back in  after being placed on \"sulfa antibiotic for my wound\". Antibiotic was stopped and rash went away. Patient states " he had a sulfa antibiotic prior to that time back when he was younger w/o a reaction.          Review of systems:  A complete review of symptoms was reviewed with patient. This is reviewed in H&P and PMH. ALL OTHERS reviewed and negative    Physical exam:  Patient Vitals for the past 24 hrs:   BP Temp Temp src Pulse Resp SpO2 Weight   01/23/23 1408 (!) 142/66 -- -- (!) 51 -- 96 % --   01/23/23 1107 (!) 159/71 -- -- (!) 48 16 95 % --   01/23/23 1002 (!) 175/80 36.9 °C (98.4 °F) Temporal (!) 50 15 93 % 99.8 kg (220 lb)     General: No acute distress.   EYES: no jaundice  HEENT: OP clear   Neck:  No JVD.   CVS:  RRR.   Resp: Normal respiratory effort,   Abdomen: ND,  Skin: Grossly nothing acute no obvious rashes  Neurological: Alert, chronic neurologic deficit  Extremities:   Mild edema. No cyanosis.       Data:  Laboratory studies personally reviewed by me:  Recent Results (from the past 24 hour(s))   CBC WITH DIFFERENTIAL    Collection Time: 01/23/23 10:09 AM   Result Value Ref Range    WBC 5.0 4.8 - 10.8 K/uL    RBC 3.99 (L) 4.70 - 6.10 M/uL    Hemoglobin 13.9 (L) 14.0 - 18.0 g/dL    Hematocrit 41.0 (L) 42.0 - 52.0 %    .8 (H) 81.4 - 97.8 fL    MCH 34.8 (H) 27.0 - 33.0 pg    MCHC 33.9 33.7 - 35.3 g/dL    RDW 51.2 (H) 35.9 - 50.0 fL    Platelet Count 144 (L) 164 - 446 K/uL    MPV 9.0 9.0 - 12.9 fL    Neutrophils-Polys 62.10 44.00 - 72.00 %    Lymphocytes 24.40 22.00 - 41.00 %    Monocytes 9.30 0.00 - 13.40 %    Eosinophils 3.20 0.00 - 6.90 %    Basophils 0.40 0.00 - 1.80 %    Immature Granulocytes 0.60 0.00 - 0.90 %    Nucleated RBC 0.00 /100 WBC    Neutrophils (Absolute) 3.13 1.82 - 7.42 K/uL    Lymphs (Absolute) 1.23 1.00 - 4.80 K/uL    Monos (Absolute) 0.47 0.00 - 0.85 K/uL    Eos (Absolute) 0.16 0.00 - 0.51 K/uL    Baso (Absolute) 0.02 0.00 - 0.12 K/uL    Immature Granulocytes (abs) 0.03 0.00 - 0.11 K/uL    NRBC (Absolute) 0.00 K/uL   Comp Metabolic Panel    Collection Time: 01/23/23 10:09 AM   Result  Value Ref Range    Sodium 141 135 - 145 mmol/L    Potassium 4.1 3.6 - 5.5 mmol/L    Chloride 108 96 - 112 mmol/L    Co2 24 20 - 33 mmol/L    Anion Gap 9.0 7.0 - 16.0    Glucose 109 (H) 65 - 99 mg/dL    Bun 14 8 - 22 mg/dL    Creatinine 0.49 (L) 0.50 - 1.40 mg/dL    Calcium 9.1 8.5 - 10.5 mg/dL    AST(SGOT) 10 (L) 12 - 45 U/L    ALT(SGPT) 7 2 - 50 U/L    Alkaline Phosphatase 69 30 - 99 U/L    Total Bilirubin 0.6 0.1 - 1.5 mg/dL    Albumin 3.8 3.2 - 4.9 g/dL    Total Protein 6.7 6.0 - 8.2 g/dL    Globulin 2.9 1.9 - 3.5 g/dL    A-G Ratio 1.3 g/dL   CORRECTED CALCIUM    Collection Time: 01/23/23 10:09 AM   Result Value Ref Range    Correct Calcium 9.3 8.5 - 10.5 mg/dL   ESTIMATED GFR    Collection Time: 01/23/23 10:09 AM   Result Value Ref Range    GFR (CKD-EPI) 109 >60 mL/min/1.73 m 2   URINALYSIS,CULTURE IF INDICATED    Collection Time: 01/23/23 11:10 AM    Specimen: Urine, Clean Catch   Result Value Ref Range    Color Red (A)     Character Bloody (A)     Micro Urine Req Microscopic    URINE MICROSCOPIC (W/UA)    Collection Time: 01/23/23 11:10 AM   Result Value Ref Range    WBC 2-5 (A) /hpf    RBC >150 (A) /hpf    Bacteria Moderate (A) None /hpf    Epithelial Cells Rare /hpf    Amorphous Crystal Present /hpf    Hyaline Cast 0-2 /lpf       Imaging:  CT-RENAL COLIC EVALUATION(A/P W/O)   Final Result      1.  Punctate nonobstructing bilateral nephrolithiasis. No ureteral stones or hydronephrosis.   2.  Hyperdense material in the bladder, hemorrhage favored over mass. Suprapubic catheter is in place.   3.  Stage IV sacral decubitus ulcer, extending to the cortical bone surface.   4.  Cardiomegaly.              EKG tracings personally reviewed by me from December sinus rhythm with bundle    Echocardiogram images personally reviewed by me show RICHARD images from November 22 reviewed appropriate placement of a watchman    All pertinent features of laboratory and imaging reviewed including primary images where  applicable      Principal Problem:    Clot hematuria POA: Yes  Active Problems:    Paroxysmal atrial flutter (HCC) POA: Yes    Suprapubic catheter (HCC) POA: Yes    Presence of Watchman left atrial appendage closure device- implanted 11/22/22 Dr Merino  POA: Yes    Acquired circulating anticoagulants (HCC) POA: Yes  Resolved Problems:    * No resolved hospital problems. *      Assessment / Plan:  Atrial flutter on chronic anticoagulation can safely hold his Xarelto he will have RICHARD as scheduled tomorrow depending on those results likely to stay on baby aspirin depending on his course of hematuria    Please keep n.p.o. after midnight        I personally discussed his case with  Dr Agatha Gonzales M.D. and Amber Cummins    Future Appointments   Date Time Provider Department Center   1/24/2023 11:00 AM [facility] EXAM 3 [facility]   1/25/2023  4:15 PM [facility]   1/27/2023  2:00 PM Steve Hodges M.D. [facility] [address]   2/3/2023  2:00 PM GREGG Duenas [facility] [address]   2/10/2023  2:00 PM Steve Hodges M.D. [facility] [address]   2/17/2023  2:00 PM Steve Hodges M.D. [facility] [address]   2/24/2023  2:00 PM Steve Hodges M.D. [facility] [address]       It is my pleasure to participate in the care of Mr. Pate.  Please do not hesitate to contact me with questions or concerns.    Gee Mehta MD PhD [facility]  Cardiologist [facility]    1/23/2023    Please note that this dictation was created using voice recognition software. There may be errors I did not discover before finalizing the note.

## 2023-01-23 NOTE — CONSULTS
[facility] [place] Consult/H&P Note    Primary Service: Medicine  Attending: Agatha Gonzales M.D.  Patient's Name/MRN: Osvaldo Pate, [id number]    Admit Date:1/23/2023  Today's Date: 1/23/2023   Length of stay:  LOS: 0 days   Room #: S603/02      Reason for consult/chief complaint: Gross hematuria  ID/HPI: Osvaldo Pate is a 72 y.o. male patient known to the urology service with a history of quadriplegia and neurogenic bladder, managed with chronic SPT. He was last seen in urology clinic on 1/9/23, at which time he was taking augmentin for a UTI. Urine culture results from cx obtained on 1/7/23 were notable for multidrug resistant proteus mirabilis, candida tropicalis, and enterococcus faecalis.     Pt presented to the ED on 1/23 with worsening GH. His SPT was exchanged for a 26Fr 3 way hutchins, hand irrigation was performed, and CBI was started. CT non-contrasted noted punctate, nonobstructing, bilateral nephroliths and hyperdensity in bladder c/w clot. His UA interpretation is limited by the gross bloodiness of his urine sample. WBC 5.0, Cr 0.49. He denies any pain or symptoms; however, due to his quadriplegia, he states he is unable to feel anything below the level of his neck and has typically been unaware when he gets UTIs. Does deny fever/chills. Notes he started Xarelto several months ago for Afib. Currently being worked up by cardiology for atrial flutter, planning for RICHARD tomorrow. Has 10 pack year smoking history, quit ~50 years ago. No prior chemical exposure, no fhx of  malignancy. No GH prior to 1 mo ago.       Past Medical History:   Past Medical History:   Diagnosis Date    A-fib (HCC)     Acute cystitis without hematuria 10/23/2021    Arrhythmia     Atrial flutter (HCC)     Blood clotting disorder (HCC)     Patient is on Xarelto    Bowel habit changes     Colostomy    GERD (gastroesophageal reflux disease)     Hypertension     Kidney stones     Neurogenic bladder     S/P cath    Open  "wound 11/18/2022    sacrum with wound vac, left ankle, [facility]    Quadriplegia, C5-C7 complete (HCC)     patient reports \" incomplete quad\"    Suprapubic catheter (HCC)         Past Surgical History:   Past Surgical History:   Procedure Laterality Date    IRRIGATION & DEBRIDEMENT GENERAL Left 04/26/2022    Procedure: IRRIGATION AND DEBRIDEMENT, WOUND - LEG;  Surgeon: Eric Raman M.D.;  Location: [facility];  Service: Orthopedics    WOUND CLOSURE NEURO Left 04/26/2022    Procedure: CLOSURE, WOUND;  Surgeon: Eric Raman M.D.;  Location: [facility];  Service: Orthopedics    ORTHOPEDIC OSTEOTOMY Left 04/26/2022    Procedure: OSTECTOMY;  Surgeon: Eric Raman M.D.;  Location: [facility];  Service: Orthopedics    INCISION AND DRAINAGE ORTHOPEDIC Left 01/27/2022    Procedure: INCISION AND DRAINAGE, WOUND, BY ORTHOPEDICS;  Surgeon: Erasmo Stewart M.D.;  Location: [facility];  Service: Orthopedics    BONE BIOPSY Left 01/27/2022    Procedure: BIOPSY, BONE;  Surgeon: Erasmo Stewart M.D.;  Location: [facility];  Service: Orthopedics    INCISION AND DRAINAGE ORTHOPEDIC  01/22/2022    Procedure: INCISION AND DRAINAGE, WOUND, BY ORTHOPEDICS;  Surgeon: Erasmo Stewart M.D.;  Location: [facility];  Service: Orthopedics    WI CYSTOSCOPY,INSERT URETERAL STENT Left 01/04/2022    Procedure: CYSTOSCOPY, WITH URETERAL STENT INSERTION;  Surgeon: Osvaldo Baltazar M.D.;  Location: [facility];  Service: Urology    WI CYSTO/URETERO/PYELOSCOPY, DX Left 01/04/2022    Procedure: URETEROSCOPY;  Surgeon: Osvaldo Baltazar M.D.;  Location: [facility];  Service: Urology    LASERTRIPSY N/A 01/04/2022    Procedure: LITHOTRIPSY, USING LASER;  Surgeon: Osvaldo Baltazar M.D.;  Location: [facility];  Service: Urology    WI CYSTOSCOPY,INSERT URETERAL STENT Left 12/16/2021    Procedure: CYSTOSCOPY, WITH URETERAL STENT INSERTION;  " Surgeon: Aly Bowen M.D.;  Location: [facility];  Service: Urology    CA CYSTO/URETERO/PYELOSCOPY, DX Left 2021    Procedure: URETEROSCOPY;  Surgeon: Aly Bowen M.D.;  Location: [facility];  Service: Urology    LASERTRIPSY Left 2021    Procedure: LITHOTRIPSY, USING LASER;  Surgeon: Aly Bowen M.D.;  Location: [facility];  Service: Urology    IRRIGATION & DEBRIDEMENT GENERAL  2020    Procedure: IRRIGATION AND DEBRIDEMENT, WOUND SACRAL ULCER;  Surgeon: Matt Cummins M.D.;  Location: [facility];  Service: Plastics    ULCER DEBRIDEMENT N/A 2019    Procedure: debridement of Sacral grade 4 ulcer - W/BONE BIOPSY, 3 liter wash out. bilateral sliding gluteal myocutaneous flap advancement;  Surgeon: Amadeo Moon M.D.;  Location: [facility];  Service: Plastics    FLAP CLOSURE  2019    Procedure: CLOSURE, FLAP - MUSCLE;  Surgeon: Amadeo Moon M.D.;  Location: [facility];  Service: Plastics    COLOSTOMY N/A 2019    Procedure: CREATION, COLOSTOMY -  placement;  Surgeon: Elías Hannah M.D.;  Location: [facility];  Service: General    COLOSTOMY      COLOSTOMY TAKEDOWN      HERNIA REPAIR      ORIF, ANKLE      PERCUTANEOUOSPINNING LOWER EXTREMITY          Family History:   Family History   Problem Relation Age of Onset    Heart Disease Father          Social History:   Social History     Tobacco Use    Smoking status: Former     Packs/day: 1.00     Years: 10.00     Pack years: 10.00     Types: Cigarettes     Start date: 1967     Quit date: 1977     Years since quittin.0    Smokeless tobacco: Never   Vaping Use    Vaping Use: Never used   Substance Use Topics    Alcohol use: Yes     Alcohol/week: 4.2 oz     Types: 7 Standard drinks or equivalent per week     Comment: Nightly    Drug use: No      Social History     Social History Narrative    Not on file         Allergies: he Sulfa drugs    Medications:   Medications Prior to Admission   Medication Sig Dispense Refill Last Dose    polyethylene glycol/lytes (MIRALAX) 17 g Pack Take 17 g by mouth 1 time a day as needed. Indications: Constipation   1/23/2023 at unk    simethicone (MYLICON) 125 MG chewable tablet Chew 125 mg every 6 hours as needed for Flatulence.   unk at unk    baclofen (LIORESAL) 20 MG tablet Take 1 Tablet by mouth 4 times a day. 120 Tablet 0 1/23/2023 at 0800    amLODIPine (NORVASC) 10 MG Tab Take 1 Tablet by mouth every morning. Hold if BP <100/64. 30 Tablet 5 unk at unk    losartan (COZAAR) 50 MG Tab Take 1 Tablet by mouth at bedtime. Hold if BP <100/64. 30 Tablet 5 unk at unk    aspirin EC 81 MG EC tablet Take 1 Tablet by mouth every day. 30 Tablet 6 1/23/2023 at 0800    rivaroxaban (XARELTO) 20 MG Tab tablet Take 1 Tablet by mouth with dinner. 90 Tablet 3 1/22/2023 at pm    Zinc 50 MG Tab Take 1 Tablet by mouth every morning.   1/23/2023 at 0800    Cholecalciferol (VITAMIN D3) 2000 UNIT Cap Take 1 Capsule by mouth every morning.   1/23/2023 at 0800    Magnesium 400 MG Tab Take 400 mg by mouth every morning.   1/23/2023 at 0800    Ascorbic Acid (VITAMIN C) 1000 MG Tab Take 1 Tablet by mouth every morning.   1/23/2023 at 0800    melatonin 5 mg Tab Take 10 mg by mouth at bedtime.   1/22/2023 at hs    Probiotic Product (PROBIOTIC PO) Take 1 Capsule by mouth every morning.   1/23/2023 at 0800    sennosides (SENOKOT) 8.6 MG Tab Take 8.6 mg by mouth 2 times a day.   1/23/2023 at 0800    ferrous sulfate 325 (65 Fe) MG tablet Take 1 Tablet by mouth 2 times a day.   1/23/2023 at 0800    docusate sodium (COLACE) 100 MG Cap Take 100 mg by mouth every day.   1/23/2023 at 0800         Review of Systems  Review of Systems   Constitutional:  Negative for chills and fever.   Neurological:         No sensation below neck   All other systems reviewed and are negative.     Physical Exam  VITAL SIGNS: BP (!) 142/66    Pulse (!) 51   Temp 36.9 °C (98.4 °F) (Temporal)   Resp 16   Wt 99.8 kg (220 lb)   SpO2 96%   BMI 31.57 kg/m²   Physical Exam  Vitals and nursing note reviewed.   Constitutional:       General: He is not in acute distress.     Comments: quadriplegic   HENT:      Head: Normocephalic and atraumatic.      Nose: Nose normal.   Eyes:      Extraocular Movements: Extraocular movements intact.      Conjunctiva/sclera: Conjunctivae normal.   Pulmonary:      Effort: Pulmonary effort is normal.   Abdominal:      General: There is no distension.      Palpations: Abdomen is soft.   Genitourinary:     Comments: 3 way hutchins in place with CBI on high flow, urine clear in tubing without clot  Musculoskeletal:      Cervical back: Neck supple.   Skin:     General: Skin is warm and dry.   Neurological:      General: No focal deficit present.      Mental Status: He is alert.      Comments: quadriplegic   Psychiatric:         Mood and Affect: Mood normal.         Behavior: Behavior normal.         Labs:  Recent Labs     01/23/23  1009   WBC 5.0   RBC 3.99*   HEMOGLOBIN 13.9*   HEMATOCRIT 41.0*   .8*   MCH 34.8*   MCHC 33.9   RDW 51.2*   PLATELETCT 144*   MPV 9.0     Recent Labs     01/23/23  1009   SODIUM 141   POTASSIUM 4.1   CHLORIDE 108   CO2 24   GLUCOSE 109*   BUN 14   CREATININE 0.49*   CALCIUM 9.1         Glucose:  Recent Labs     01/23/23  1009   GLUCOSE 109*     Coags:  No results for input(s): INR in the last 72 hours.      Urinalysis:   Recent Labs     01/23/23  1110   COLORURINE Red*   CLARITY Bloody*   RBCURINE >150*   BACTERIA Moderate*   EPITHELCELL Rare       Imaging:  CT-RENAL COLIC EVALUATION(A/P W/O)   Final Result      1.  Punctate nonobstructing bilateral nephrolithiasis. No ureteral stones or hydronephrosis.   2.  Hyperdense material in the bladder, hemorrhage favored over mass. Suprapubic catheter is in place.   3.  Stage IV sacral decubitus ulcer, extending to the cortical bone surface.   4.   Cardiomegaly.      EC-RICHARD W/O CONT    (Results Pending)   CT-ABDOMEN & PELVIS UROGRAM    (Results Pending)       @lastct@     Assessment/Recommendation   72 y.o. M with GH in the setting of chronic indwelling SPT on anticoagulation, with recent history of multidrug resistant UTI    Continue CBI, titrating to maintain urine light pink to clear in tubing, hand irrigate qshift and PRN clot  Ucx on 1/7/23 was positive for multiple organisms, and due to quadriplegia and sensory loss, difficult to assess whether symptomatic of UTI. Repeat urine culture ordered and pending, recommend empiric anti-microbials per last culture/sensitivity, and can consider ID consult while inpatient  CT Urogram ordered, will also require cystoscopy, likely as outpatient  Urology will continue to follow    Plan discussed with patient and with nursing. Dr. Bowen is aware of consult and has directed this patient's plan of care.       APRIL Seth-CDeniz   [address] [place] [address]   [phone]

## 2023-01-23 NOTE — ED PROVIDER NOTES
ED Provider Note    Scribed for Amber Cummins M.D. by Rui Valentin. 1/23/2023, 10:06 AM.    Primary care provider: KATE Pretty  Means of arrival: EMS  History obtained from: Patient  History limited by: None    CHIEF COMPLAINT  Chief Complaint   Patient presents with    Blood in Urine       HPI/ROS  Osvaldo Pate is a 72 y.o. male with a history of quadriplegia and neurogenic bladder who presents to the Emergency Department via EMS from [facility] for acute hematuria. Patient has a suprapubic catheter and reports noticing blood in his catheter bag intermittently for the past week. The amount of blood was significantly increased today prompting staff to call EMS. He denies any blood clots in his urine. He is currently on Xarelto.  Patient has had hematuria with his suprapubic catheter and being on Xarelto but it is typically very minimal.  He flushes his catheter every morning to help with this.  Today he has had abilio dark blood in his catheter bag.    EXTERNAL RECORDS REVIEWED  Patient seen here on 1/7/23 with clogged suprapubic catheter.  He was noted to have increased sediment in his urine and was diagnosed with urinary tract infection.  Was seen by myself and placed on Augmentin.  Per review of cultures his Proteus was sensitive to this.    LIMITATION TO HISTORY   Select: : None    OUTSIDE HISTORIAN(S):  none      PAST MEDICAL HISTORY   has a past medical history of A-fib ([facility]), Acute cystitis without hematuria (10/23/2021), Arrhythmia, Atrial flutter ([facility]), Blood clotting disorder ([facility]), Bowel habit changes, GERD (gastroesophageal reflux disease), Hypertension, Kidney stones, Neurogenic bladder, Open wound (11/18/2022), Quadriplegia, C5-C7 complete ([facility]), and Suprapubic catheter ([facility]).    SURGICAL HISTORY   has a past surgical history that includes percutaneouospinning lower extremity; colostomy; colostomy takedown; colostomy (N/A, 07/27/2019); ulcer debridement (N/A, 08/21/2019);  flap closure (2019); hernia repair; irrigation & debridement general (2020); cystoscopy,insert ureteral stent (Left, 2021); cysto/uretero/pyeloscopy, dx (Left, 2021); lasertripsy (Left, 2021); cystoscopy,insert ureteral stent (Left, 2022); cysto/uretero/pyeloscopy, dx (Left, 2022); lasertripsy (N/A, 2022); incision and drainage orthopedic (2022); incision and drainage orthopedic (Left, 2022); bone biopsy (Left, 2022); irrigation & debridement general (Left, 2022); wound closure neuro (Left, 2022); orthopedic osteotomy (Left, 2022); and orif, ankle.    SOCIAL HISTORY  Social History     Tobacco Use    Smoking status: Former     Packs/day: 1.00     Years: 10.00     Pack years: 10.00     Types: Cigarettes     Start date: 1967     Quit date: 1977     Years since quittin.0    Smokeless tobacco: Never   Vaping Use    Vaping Use: Never used   Substance Use Topics    Alcohol use: Yes     Alcohol/week: 4.2 oz     Types: 7 Standard drinks or equivalent per week     Comment: Nightly    Drug use: No      Social History     Substance and Sexual Activity   Drug Use No       FAMILY HISTORY  Family History   Problem Relation Age of Onset    Heart Disease Father        CURRENT MEDICATIONS  Home Medications       Reviewed by Humberto Magaña (Pharmacy Tech) on 23 at 1354  Med List Status: Complete     Medication Last Dose Status   amLODIPine (NORVASC) 10 MG Tab unk Active   Ascorbic Acid (VITAMIN C) 1000 MG Tab 2023 Active   aspirin EC 81 MG EC tablet 2023 Active   baclofen (LIORESAL) 20 MG tablet 2023 Active   Cholecalciferol (VITAMIN D3) 2000 UNIT Cap 2023 Active   docusate sodium (COLACE) 100 MG Cap 2023 Active   ferrous sulfate 325 (65 Fe) MG tablet 2023 Active   losartan (COZAAR) 50 MG Tab unk Active   Magnesium 400 MG Tab 2023 Active   melatonin 5 mg Tab 2023 Active   polyethylene  "glycol/lytes (MIRALAX) 17 g Pack 1/23/2023 Active   Probiotic Product (PROBIOTIC PO) 1/23/2023 Active   rivaroxaban (XARELTO) 20 MG Tab tablet 1/22/2023 Active   sennosides (SENOKOT) 8.6 MG Tab 1/23/2023 Active   simethicone (MYLICON) 125 MG chewable tablet unk Active   Zinc 50 MG Tab 1/23/2023 Active                    ALLERGIES  Allergies   Allergen Reactions    Sulfa Drugs Rash     Rash, developed this back in 2015 after being placed on \"sulfa antibiotic for my wound\". Antibiotic was stopped and rash went away. Patient states he had a sulfa antibiotic prior to that time back when he was younger w/o a reaction.          PHYSICAL EXAM  VITAL SIGNS: BP (!) 175/80   Pulse (!) 50   Temp 36.9 °C (98.4 °F) (Temporal)   Resp 15   Wt 99.8 kg (220 lb)   SpO2 93%   BMI 31.57 kg/m²   Vitals reviewed by myself.  Physical Exam  Nursing note and vitals reviewed.  Constitutional: Well-developed and well-nourished. No acute distress.   HENT: Head is normocephalic and atraumatic.  Eyes: extra-ocular movements intact  Cardiovascular: Regular rate and regular rhythm. No murmur heard.  Pulmonary/Chest: Breath sounds normal. No wheezes or rales.   Abdominal: Soft and non-tender. No distention.  Suprapubic catheter is in place, mild erythema around the catheter insertion site.  Cazares catheter with dark red blood  Musculoskeletal: Extremities exhibit normal range of motion without edema or tenderness.   Neurological: Awake and alert  Skin: Skin is warm and dry. No rash.       DIAGNOSTIC STUDIES:  LABS  Labs Reviewed   CBC WITH DIFFERENTIAL - Abnormal; Notable for the following components:       Result Value    RBC 3.99 (*)     Hemoglobin 13.9 (*)     Hematocrit 41.0 (*)     .8 (*)     MCH 34.8 (*)     RDW 51.2 (*)     Platelet Count 144 (*)     All other components within normal limits   COMP METABOLIC PANEL - Abnormal; Notable for the following components:    Glucose 109 (*)     Creatinine 0.49 (*)     AST(SGOT) 10 (*)     " All other components within normal limits   URINALYSIS,CULTURE IF INDICATED - Abnormal; Notable for the following components:    Color Red (*)     Character Bloody (*)     All other components within normal limits    Narrative:     Indication for culture:->Unexplained new onset of Flank pain  and/or Costovertebral angle tenderness   URINE MICROSCOPIC (W/UA) - Abnormal; Notable for the following components:    WBC 2-5 (*)     RBC >150 (*)     Bacteria Moderate (*)     All other components within normal limits    Narrative:     Indication for culture:->Unexplained new onset of Flank pain  and/or Costovertebral angle tenderness   CORRECTED CALCIUM   ESTIMATED GFR   URINE CULTURE-EXISTING-LESS THAN 48 HOURS       All labs reviewed and independently interpreted by myself      RADIOLOGY    CT-RENAL COLIC EVALUATION(A/P W/O)   Final Result      1.  Punctate nonobstructing bilateral nephrolithiasis. No ureteral stones or hydronephrosis.   2.  Hyperdense material in the bladder, hemorrhage favored over mass. Suprapubic catheter is in place.   3.  Stage IV sacral decubitus ulcer, extending to the cortical bone surface.   4.  Cardiomegaly.        The radiologist's interpretation of all radiological studies have been reviewed by me.          COURSE & MEDICAL DECISION MAKING    ED Observation Status? No; Patient does not meet criteria for ED Observation.     INITIAL ASSESSMENT AND PLAN    Patient is a 72-year-old male who comes in for evaluation of hematuria.  Differential diagnosis includes medication side effect, anemia, urinary tract infection, mass.  Diagnostic work-up includes labs, urinalysis and renal CT.     ER COURSE AND FINAL DISPOSITION   Patient's initial vitals are within normal limits.  He is not having any discomfort.  Labs returned and independently interpreted by myself to demonstrate:  -Hemoglobin is stable at 13.9  -Slight thrombocytopenia  -Renal function is within normal limits, labs otherwise  unremarkable    CT scan returns and demonstrates no acute pathology to explain hematuria, hematuria is likely secondary to suprapubic catheter in the setting of being on Xarelto.  After flushing suprapubic catheter in the emergency department hematuria was not clearing and therefore catheter was replaced with a 26 [place] three-way catheter and continuous bladder irrigation was initiated.  Patient will require hospitalization for continuous bladder irrigation.  I spoke with urologist Dr. Bowen who will consult on the patient.  I spoke with hospitalist Dr. Gonzales for ongoing management hospitalization.  I spoke with cardiologist Dr. Mehta as patient is supposed to have procedure tomorrow to determine whether or not he can stop taking Xarelto.  Patient is updated on plan of care and agreeable.  He is hospitalized in guarded condition.      ADDITIONAL PROBLEM LIST AND RESOURCE UTILIZATION    Additional problems aside from the chief complaint that I have addressed: none    I have discussed management of the patient with the following physicians and MARCO's: Dr. Bowen (urology), Dr. Gonzales (hosp), Dr. Mehta (cardiology)    Discussion of management with other [facility] or appropriate source(s): none     Escalation of care considered, and ultimately not performed: see above.     Barriers to care at this time, including but not limited to: none.     Decision tools and prescription drugs considered including, but not limited to: see above.    Please see review of records as noted above    DISPOSITION:  Patient will be hospitalized by Dr. Gonzales in guarded condition.    FINAL IMPRESSION  1. Hematuria, unspecified type          I, Rui Valentin (Aubrey), am scribing for, and in the presence of, Amber Cummins M.D..    Electronically signed by: Rui Valentin (Aubrey), 1/23/2023    IAmber M.D. personally performed the services described in this documentation, as scribed by Rui Valentin in my presence,  and it is both accurate and complete.    The note accurately reflects work and decisions made by me.  Amber Cummins M.D.  1/23/2023  2:21 PM
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
## 2023-01-23 NOTE — ED TRIAGE NOTES
Pt MARISA erickson from his group home, [facility], with c/c of blood in catheter bag. Pt reporting on and off blood noted for the past week but significantly worse today. Pt does take xarelto. Pt with suprapubic catheter.

## 2023-01-24 ENCOUNTER — ANESTHESIA EVENT (OUTPATIENT)
Dept: CARDIOLOGY | Facility: MEDICAL CENTER | Age: 73
DRG: 695 | End: 2023-01-24
Payer: MEDICARE

## 2023-01-24 ENCOUNTER — APPOINTMENT (OUTPATIENT)
Dept: RADIOLOGY | Facility: MEDICAL CENTER | Age: 73
DRG: 695 | End: 2023-01-24
Payer: MEDICARE

## 2023-01-24 ENCOUNTER — HOSPITAL ENCOUNTER (OUTPATIENT)
Dept: CARDIOLOGY | Facility: MEDICAL CENTER | Age: 73
End: 2023-01-24
Attending: NURSE PRACTITIONER | Admitting: INTERNAL MEDICINE
Payer: MEDICARE

## 2023-01-24 ENCOUNTER — APPOINTMENT (OUTPATIENT)
Dept: CARDIOLOGY | Facility: MEDICAL CENTER | Age: 73
DRG: 695 | End: 2023-01-24
Attending: INTERNAL MEDICINE
Payer: MEDICARE

## 2023-01-24 ENCOUNTER — ANESTHESIA (OUTPATIENT)
Dept: CARDIOLOGY | Facility: MEDICAL CENTER | Age: 73
DRG: 695 | End: 2023-01-24
Payer: MEDICARE

## 2023-01-24 PROBLEM — D53.9 MACROCYTIC ANEMIA: Status: ACTIVE | Noted: 2019-07-25

## 2023-01-24 LAB
ANION GAP SERPL CALC-SCNC: 7 MMOL/L (ref 7–16)
BASOPHILS # BLD AUTO: 0.3 % (ref 0–1.8)
BASOPHILS # BLD: 0.02 K/UL (ref 0–0.12)
BUN SERPL-MCNC: 15 MG/DL (ref 8–22)
CALCIUM SERPL-MCNC: 8.8 MG/DL (ref 8.5–10.5)
CHLORIDE SERPL-SCNC: 108 MMOL/L (ref 96–112)
CO2 SERPL-SCNC: 26 MMOL/L (ref 20–33)
CREAT SERPL-MCNC: 0.61 MG/DL (ref 0.5–1.4)
EKG IMPRESSION: NORMAL
EOSINOPHIL # BLD AUTO: 0.23 K/UL (ref 0–0.51)
EOSINOPHIL NFR BLD: 4 % (ref 0–6.9)
ERYTHROCYTE [DISTWIDTH] IN BLOOD BY AUTOMATED COUNT: 52.6 FL (ref 35.9–50)
GFR SERPLBLD CREATININE-BSD FMLA CKD-EPI: 102 ML/MIN/1.73 M 2
GLUCOSE SERPL-MCNC: 96 MG/DL (ref 65–99)
HCT VFR BLD AUTO: 37.5 % (ref 42–52)
HGB BLD-MCNC: 12.3 G/DL (ref 14–18)
IMM GRANULOCYTES # BLD AUTO: 0.05 K/UL (ref 0–0.11)
IMM GRANULOCYTES NFR BLD AUTO: 0.9 % (ref 0–0.9)
LYMPHOCYTES # BLD AUTO: 1.49 K/UL (ref 1–4.8)
LYMPHOCYTES NFR BLD: 25.6 % (ref 22–41)
MCH RBC QN AUTO: 34.5 PG (ref 27–33)
MCHC RBC AUTO-ENTMCNC: 32.8 G/DL (ref 33.7–35.3)
MCV RBC AUTO: 105 FL (ref 81.4–97.8)
MONOCYTES # BLD AUTO: 0.65 K/UL (ref 0–0.85)
MONOCYTES NFR BLD AUTO: 11.2 % (ref 0–13.4)
NEUTROPHILS # BLD AUTO: 3.37 K/UL (ref 1.82–7.42)
NEUTROPHILS NFR BLD: 58 % (ref 44–72)
NRBC # BLD AUTO: 0 K/UL
NRBC BLD-RTO: 0 /100 WBC
PLATELET # BLD AUTO: 136 K/UL (ref 164–446)
PMV BLD AUTO: 9.2 FL (ref 9–12.9)
POTASSIUM SERPL-SCNC: 4.2 MMOL/L (ref 3.6–5.5)
RBC # BLD AUTO: 3.57 M/UL (ref 4.7–6.1)
SODIUM SERPL-SCNC: 141 MMOL/L (ref 135–145)
WBC # BLD AUTO: 5.8 K/UL (ref 4.8–10.8)

## 2023-01-24 PROCEDURE — 93325 DOPPLER ECHO COLOR FLOW MAPG: CPT

## 2023-01-24 PROCEDURE — 80048 BASIC METABOLIC PNL TOTAL CA: CPT

## 2023-01-24 PROCEDURE — A9270 NON-COVERED ITEM OR SERVICE: HCPCS | Performed by: STUDENT IN AN ORGANIZED HEALTH CARE EDUCATION/TRAINING PROGRAM

## 2023-01-24 PROCEDURE — B246ZZ4 ULTRASONOGRAPHY OF RIGHT AND LEFT HEART, TRANSESOPHAGEAL: ICD-10-PCS | Performed by: INTERNAL MEDICINE

## 2023-01-24 PROCEDURE — 93312 ECHO TRANSESOPHAGEAL: CPT | Mod: 26 | Performed by: INTERNAL MEDICINE

## 2023-01-24 PROCEDURE — 700111 HCHG RX REV CODE 636 W/ 250 OVERRIDE (IP): Performed by: ANESTHESIOLOGY

## 2023-01-24 PROCEDURE — 99100 ANES PT EXTEME AGE<1 YR&>70: CPT | Performed by: ANESTHESIOLOGY

## 2023-01-24 PROCEDURE — 85025 COMPLETE CBC W/AUTO DIFF WBC: CPT

## 2023-01-24 PROCEDURE — 160035 HCHG PACU - 1ST 60 MINS PHASE I

## 2023-01-24 PROCEDURE — 700102 HCHG RX REV CODE 250 W/ 637 OVERRIDE(OP): Performed by: STUDENT IN AN ORGANIZED HEALTH CARE EDUCATION/TRAINING PROGRAM

## 2023-01-24 PROCEDURE — 99232 SBSQ HOSP IP/OBS MODERATE 35: CPT | Performed by: STUDENT IN AN ORGANIZED HEALTH CARE EDUCATION/TRAINING PROGRAM

## 2023-01-24 PROCEDURE — 01922 ANES N-INVAS IMG/RADJ THER: CPT | Performed by: ANESTHESIOLOGY

## 2023-01-24 PROCEDURE — 93005 ELECTROCARDIOGRAM TRACING: CPT | Performed by: STUDENT IN AN ORGANIZED HEALTH CARE EDUCATION/TRAINING PROGRAM

## 2023-01-24 PROCEDURE — 700101 HCHG RX REV CODE 250: Performed by: ANESTHESIOLOGY

## 2023-01-24 PROCEDURE — 74178 CT ABD&PLV WO CNTR FLWD CNTR: CPT

## 2023-01-24 PROCEDURE — 93010 ELECTROCARDIOGRAM REPORT: CPT | Performed by: INTERNAL MEDICINE

## 2023-01-24 PROCEDURE — 700117 HCHG RX CONTRAST REV CODE 255

## 2023-01-24 PROCEDURE — 770006 HCHG ROOM/CARE - MED/SURG/GYN SEMI*

## 2023-01-24 PROCEDURE — 36415 COLL VENOUS BLD VENIPUNCTURE: CPT

## 2023-01-24 PROCEDURE — 160002 HCHG RECOVERY MINUTES (STAT)

## 2023-01-24 PROCEDURE — 700105 HCHG RX REV CODE 258: Performed by: ANESTHESIOLOGY

## 2023-01-24 RX ORDER — SODIUM CHLORIDE, SODIUM LACTATE, POTASSIUM CHLORIDE, CALCIUM CHLORIDE 600; 310; 30; 20 MG/100ML; MG/100ML; MG/100ML; MG/100ML
INJECTION, SOLUTION INTRAVENOUS
Status: DISCONTINUED | OUTPATIENT
Start: 2023-01-24 | End: 2023-01-24 | Stop reason: SURG

## 2023-01-24 RX ORDER — LIDOCAINE HYDROCHLORIDE 20 MG/ML
INJECTION, SOLUTION EPIDURAL; INFILTRATION; INTRACAUDAL; PERINEURAL PRN
Status: DISCONTINUED | OUTPATIENT
Start: 2023-01-24 | End: 2023-01-24 | Stop reason: SURG

## 2023-01-24 RX ADMIN — Medication 400 MG: at 06:00

## 2023-01-24 RX ADMIN — PROPOFOL 10 MG: 10 INJECTION, EMULSION INTRAVENOUS at 11:19

## 2023-01-24 RX ADMIN — Medication 2000 UNITS: at 06:00

## 2023-01-24 RX ADMIN — FERROUS SULFATE TAB 325 MG (65 MG ELEMENTAL FE) 325 MG: 325 (65 FE) TAB at 08:40

## 2023-01-24 RX ADMIN — IOHEXOL 100 ML: 350 INJECTION, SOLUTION INTRAVENOUS at 15:45

## 2023-01-24 RX ADMIN — FENTANYL CITRATE 25 MCG: 50 INJECTION, SOLUTION INTRAMUSCULAR; INTRAVENOUS at 11:11

## 2023-01-24 RX ADMIN — SODIUM CHLORIDE, POTASSIUM CHLORIDE, SODIUM LACTATE AND CALCIUM CHLORIDE: 600; 310; 30; 20 INJECTION, SOLUTION INTRAVENOUS at 11:03

## 2023-01-24 RX ADMIN — AMLODIPINE BESYLATE 10 MG: 10 TABLET ORAL at 06:00

## 2023-01-24 RX ADMIN — SENNOSIDES AND DOCUSATE SODIUM 2 TABLET: 50; 8.6 TABLET ORAL at 06:01

## 2023-01-24 RX ADMIN — BACLOFEN 20 MG: 10 TABLET ORAL at 12:46

## 2023-01-24 RX ADMIN — LIDOCAINE HYDROCHLORIDE 50 MG: 20 INJECTION, SOLUTION EPIDURAL; INFILTRATION; INTRACAUDAL at 11:16

## 2023-01-24 RX ADMIN — BACLOFEN 20 MG: 10 TABLET ORAL at 08:40

## 2023-01-24 RX ADMIN — MIDAZOLAM 2 MG: 1 INJECTION INTRAMUSCULAR; INTRAVENOUS at 11:11

## 2023-01-24 RX ADMIN — BACLOFEN 20 MG: 10 TABLET ORAL at 19:56

## 2023-01-24 RX ADMIN — PROPOFOL 30 MG: 10 INJECTION, EMULSION INTRAVENOUS at 11:14

## 2023-01-24 RX ADMIN — SENNOSIDES AND DOCUSATE SODIUM 2 TABLET: 50; 8.6 TABLET ORAL at 17:38

## 2023-01-24 RX ADMIN — OXYCODONE HYDROCHLORIDE AND ACETAMINOPHEN 1000 MG: 500 TABLET ORAL at 06:01

## 2023-01-24 RX ADMIN — BACLOFEN 20 MG: 10 TABLET ORAL at 17:37

## 2023-01-24 RX ADMIN — POLYETHYLENE GLYCOL 3350 1 PACKET: 17 POWDER, FOR SOLUTION ORAL at 20:09

## 2023-01-24 RX ADMIN — Medication 10 MG: at 19:56

## 2023-01-24 ASSESSMENT — COGNITIVE AND FUNCTIONAL STATUS - GENERAL
DRESSING REGULAR UPPER BODY CLOTHING: A LITTLE
MOVING TO AND FROM BED TO CHAIR: UNABLE
STANDING UP FROM CHAIR USING ARMS: A LOT
MOVING FROM LYING ON BACK TO SITTING ON SIDE OF FLAT BED: UNABLE
SUGGESTED CMS G CODE MODIFIER DAILY ACTIVITY: CK
CLIMB 3 TO 5 STEPS WITH RAILING: TOTAL
HELP NEEDED FOR BATHING: A LOT
SUGGESTED CMS G CODE MODIFIER MOBILITY: CM
MOBILITY SCORE: 8
WALKING IN HOSPITAL ROOM: TOTAL
DAILY ACTIVITIY SCORE: 17
TURNING FROM BACK TO SIDE WHILE IN FLAT BAD: A LOT
TOILETING: A LOT
DRESSING REGULAR LOWER BODY CLOTHING: A LOT

## 2023-01-24 ASSESSMENT — PAIN DESCRIPTION - PAIN TYPE
TYPE: ACUTE PAIN
TYPE: ACUTE PAIN

## 2023-01-24 ASSESSMENT — LIFESTYLE VARIABLES
TOTAL SCORE: 0
EVER FELT BAD OR GUILTY ABOUT YOUR DRINKING: NO
EVER HAD A DRINK FIRST THING IN THE MORNING TO STEADY YOUR NERVES TO GET RID OF A HANGOVER: NO
AVERAGE NUMBER OF DAYS PER WEEK YOU HAVE A DRINK CONTAINING ALCOHOL: 0
ON A TYPICAL DAY WHEN YOU DRINK ALCOHOL HOW MANY DRINKS DO YOU HAVE: 0
ALCOHOL_USE: NO
CONSUMPTION TOTAL: NEGATIVE
TOTAL SCORE: 0
TOTAL SCORE: 0
HOW MANY TIMES IN THE PAST YEAR HAVE YOU HAD 5 OR MORE DRINKS IN A DAY: 0
DOES PATIENT WANT TO STOP DRINKING: NO
HAVE PEOPLE ANNOYED YOU BY CRITICIZING YOUR DRINKING: NO
HAVE YOU EVER FELT YOU SHOULD CUT DOWN ON YOUR DRINKING: NO

## 2023-01-24 ASSESSMENT — ENCOUNTER SYMPTOMS
ABDOMINAL PAIN: 0
NAUSEA: 0
EYE PAIN: 0
HEADACHES: 0
FEVER: 0
DEPRESSION: 0
WHEEZING: 0
COUGH: 0
CHILLS: 0
SHORTNESS OF BREATH: 0
VOMITING: 0

## 2023-01-24 NOTE — ASSESSMENT & PLAN NOTE
Chronic, currently uncontrolled  -Resume home blood pressure medications  -As needed antihypertensives

## 2023-01-24 NOTE — DISCHARGE PLANNING
Received Choice form at 1259  Agency/Facility Name: [facility]  Referral sent per Choice form @ Unable to fax referral.  Currently no HH order.

## 2023-01-24 NOTE — ANESTHESIA PREPROCEDURE EVALUATION
Date/Time: 01/24/23 1100    Scheduled providers: Gee Mehta M.D.; Eric Naqvi M.D.    Procedure: EC-RICHARD W/O CONT    Diagnosis:       Clot hematuria [R31.0]      Clot hematuria [R31.0]    Location: [facility] - [facility]OLOG[facility] - [facility]      C7 spinal cord injury - quad  Blood in urine  Watchman procedure 2 mos ago    Relevant Problems   CARDIAC   (positive) Atrial fibrillation (HCC)   (positive) Essential hypertension   (positive) Hypertension   (positive) Paroxysmal atrial flutter (HCC)         (positive) Nephrolithiasis      Other   (positive) Osteomyelitis of left ankle (HCC)       Physical Exam    Airway   Mallampati: II  TM distance: >3 FB  Neck ROM: full       Cardiovascular - normal exam  Rhythm: regular  Rate: normal  (-) murmur     Dental - normal exam           Pulmonary - normal exam  Breath sounds clear to auscultation     Abdominal    Neurological - normal exam                 Anesthesia Plan    ASA 3   ASA physical status 3 criteria: other (comment)    Plan - MAC         (C7 quadraplegia)      Induction: intravenous      Pertinent diagnostic labs and testing reviewed    Informed Consent:    Anesthetic plan and risks discussed with patient.    Use of blood products discussed with: patient whom consented to blood products.

## 2023-01-24 NOTE — ANESTHESIA POSTPROCEDURE EVALUATION
Patient: Osvaldo Pate    Procedure Summary     Date: 01/24/23 Room / Location: [facility] - ECHOCARDIOLOGY [facility]    Anesthesia Start: 1103 Anesthesia Stop: 1128    Procedure: EC-RICHARD W/O CONT Diagnosis:       Clot hematuria      Clot hematuria    Scheduled Providers: Gee Mehta M.D.; Eric Naqvi M.D. Responsible Provider: Eric Naqvi M.D.    Anesthesia Type: MAC ASA Status: 3          Final Anesthesia Type: MAC  Last vitals  BP   Blood Pressure : 97/55    Temp   36.4 °C (97.6 °F)    Pulse   (!) 59   Resp   16    SpO2   93 %      Anesthesia Post Evaluation    Patient location during evaluation: PACU  Patient participation: complete - patient participated  Level of consciousness: awake and alert    Airway patency: patent  Anesthetic complications: no  Cardiovascular status: hemodynamically stable  Respiratory status: acceptable  Hydration status: euvolemic    PONV: none          No notable events documented.     Nurse Pain Score: 0 (NPRS)

## 2023-01-24 NOTE — DISCHARGE PLANNING
Case Management Discharge Planning    Admission Date: 1/23/2023  GMLOS: 3.6  ALOS: 1    6-Clicks ADL Score: 17  6-Clicks Mobility Score: 8  PT and/or OT Eval ordered: No  Post-acute Referrals Ordered: Yes  Post-acute Choice Obtained: Yes  Has referral(s) been sent to post-acute provider:  Yes      Anticipated Discharge Dispo: Discharge Disposition: D/T to home under HHA care in anticipation of covered skilled care (06)    DME Needed: No    Action(s) Taken: Updated Provider/Nurse on Discharge Plan, Referral(s) sent, and OTHER    Escalations Completed: None    Medically Clear: No    Next Steps: Voalte texted Dr Brewer to add HH order for wound care as pt was previously on service with HH.     Barriers to Discharge: Medical clearance and Outpatient referrals pending    Is the patient up for discharge tomorrow: No

## 2023-01-24 NOTE — ANESTHESIA TIME REPORT
Anesthesia Start and Stop Event Times     Date Time Event    1/24/2023 1102 Ready for Procedure     1103 Anesthesia Start     1128 Anesthesia Stop        Responsible Staff  01/24/23    Name Role Begin End    Eric Naqvi M.D. Anesth 1103 1128        Overtime Reason:  no overtime (within assigned shift)    Comments:

## 2023-01-24 NOTE — FACE TO FACE
Face to Face Supporting Documentation - Home Health    The encounter with this patient was in whole or in part the primary reason for home health admission.    Date of encounter:   Patient:                    MRN:                       YOB: 2023  Osvaldo Pate  [id number]  1950     Home health to see patient for:  Skilled Nursing care for assessment, interventions & education, Wound Care, Home health aide, Physical Therapy evaluation and treatment, and Occupational therapy evaluation and treatment    Skilled need for:  Comment: Hematuria    Skilled nursing interventions to include:  Wound Care and Comment: PT OT     Homebound status evidenced by:  Have a condition such that leaving his or her home is medically contraindicated. Leaving home requires a considerable and taxing effort. There is a normal inability to leave the home.    Community Physician to provide follow up care: KATE Pretty     Optional Interventions? No      I certify the face to face encounter for this home health care referral meets the CMS requirements and the encounter/clinical assessment with the patient was, in whole, or in part, for the medical condition(s) listed above, which is the primary reason for home health care. Based on my clinical findings: the service(s) are medically necessary, support the need for home health care, and the homebound criteria are met.  I certify that this patient has had a face to face encounter by myself.  Fabiana Brewer M.D. - NPI: [id number]

## 2023-01-24 NOTE — CARE PLAN
The patient is Stable - Low risk of patient condition declining or worsening    Shift Goals  Clinical Goals: CBI mngmt, skin integrity, rest  Patient Goals: RICHARD tomorrow, rest    Progress made toward(s) clinical / shift goals:  Pt CBI in place w/ urine clear, low airloss mattress, foot boots, and Q2 turns in place.     Problem: Knowledge Deficit - Standard  Goal: Patient and family/care givers will demonstrate understanding of plan of care, disease process/condition, diagnostic tests and medications  1/24/2023 0019 by Ceci Ramirez R.N.  Outcome: Progressing  1/24/2023 0013 by Ceci Ramirez R.N.  Outcome: Progressing     Problem: Skin Integrity  Goal: Skin integrity is maintained or improved  1/24/2023 0019 by Ceci Ramirez R.N.  Outcome: Progressing  1/24/2023 0013 by Ceci Ramirez R.N.  Outcome: Progressing

## 2023-01-24 NOTE — PROGRESS NOTES
Note to reader: this note follows the APSO format rather than the historical SOAP format. Assessment and plan located at the top of the note for ease of use.    Chief Complaint  72 y.o. year old male here with Blood in Urine      Assessment/Plan  Interval History   Active Hospital Problems    Diagnosis     Clot hematuria [R31.0]     Acquired circulating anticoagulants (HCC) [D68.318]     Presence of Watchman left atrial appendage closure device- implanted 11/22/22 Dr Merino  [Z95.818]     Suprapubic catheter ([facility]) [Z93.59]     Quadriplegia, C5-C7 complete ([facility]) [G82.53]     Colostomy in place ([facility]) [Z93.3]     Paroxysmal atrial flutter ([facility]) [I48.92]     Essential hypertension [I10]       pt seen and examined     1/24- urine clearing. No urologic overnight events. H/h stable, slightly down from yesterday 12.3/27.5. Cr 0.67. hypertensive during rounds. CT-U been rescheduled 3x.     Disposition  stable   PLAN:  Await CT-U results. With urine clearing, likely will plan for cystoscopy outpatient that I will arrange.   Urology to follow.       Review of Systems  Physical Exam   Review of Systems   Constitutional:  Negative for chills and fever.   Respiratory:  Negative for cough and shortness of breath.    Cardiovascular:  Negative for chest pain.   Gastrointestinal:  Negative for abdominal pain, nausea and vomiting.   Genitourinary:  Positive for hematuria.   Neurological:  Negative for headaches.   All other systems reviewed and are negative.  Vitals:    01/23/23 1900 01/24/23 0417 01/24/23 0600 01/24/23 0745   BP: 120/59 104/54 (!) 156/70 137/66   Pulse: (!) 47 96  74   Resp: 18 18  17   Temp: 36.7 °C (98 °F) 36.3 °C (97.3 °F)  36.9 °C (98.5 °F)   TempSrc: Temporal Temporal  Temporal   SpO2: 96% 94%  96%   Weight:         Physical Exam  Vitals and nursing note reviewed.   HENT:      Head: Normocephalic and atraumatic.      Right Ear: Tympanic membrane normal.      Left Ear: Tympanic membrane normal.   Eyes:       Pupils: Pupils are equal, round, and reactive to light.   Pulmonary:      Effort: Pulmonary effort is normal.   Genitourinary:     Comments: Spt with concentrated yellow urine, no hematuria  Neurological:      Mental Status: He is oriented to person, place, and time.   Psychiatric:         Mood and Affect: Mood normal.         Behavior: Behavior normal.         Thought Content: Thought content normal.        Hematology Chemistry   Lab Results   Component Value Date/Time    WBC 5.8 01/24/2023 03:23 AM    HEMOGLOBIN 12.3 (L) 01/24/2023 03:23 AM    HEMATOCRIT 37.5 (L) 01/24/2023 03:23 AM    PLATELETCT 136 (L) 01/24/2023 03:23 AM     Lab Results   Component Value Date/Time    SODIUM 141 01/24/2023 03:23 AM    POTASSIUM 4.2 01/24/2023 03:23 AM    CHLORIDE 108 01/24/2023 03:23 AM    CO2 26 01/24/2023 03:23 AM    GLUCOSE 96 01/24/2023 03:23 AM    BUN 15 01/24/2023 03:23 AM    CREATININE 0.61 01/24/2023 03:23 AM         Labs not explicitly included in this progress note were reviewed by the author.   Radiology/imaging not explicitly included in this progress note was reviewed by the author.     Core Measures

## 2023-01-24 NOTE — PROGRESS NOTES
Pt refused LLE and Sacral wounds to be pictured and accessed due to wound care just completed 1/23/23 AM. Charge RN notified and patient informed that will need to access tomorrow. Will attempt Access tomorrow.

## 2023-01-24 NOTE — CARE PLAN
The patient is Stable - Low risk of patient condition declining or worsening    Shift Goals  Clinical Goals: CT, RICHARD and CBI management  Patient Goals: RICHARD, CT and wound care    Progress made toward(s) clinical / shift goals:    Problem: Knowledge Deficit - Standard  Goal: Patient and family/care givers will demonstrate understanding of plan of care, disease process/condition, diagnostic tests and medications  Outcome: Progressing  Note: Patient knowledge of all procedures, wound care and medications this shift      Problem: Skin Integrity  Goal: Skin integrity is maintained or improved  Outcome: Progressing  Note: Wound pictures of admission and wound care team to see patient tomorrow 1/25/23       Patient is not progressing towards the following goals:

## 2023-01-24 NOTE — ASSESSMENT & PLAN NOTE
Patient with worsening hematuria with clots, onset this morning, do not suspect infectious etiology as he has no signs/symptoms of UTI  -Renal CT scan without obstructing stones, hemorrhage seen within the bladder  -Holding Xarelto  -Suprapubic cath changed to three-way and CBI initiated  -Urology has been consulted  -Very minimal drop in hemoglobin at this point we will continue to monitor closely    urology consulted, plan for CT urogram today ; outpatient cystoscopy  May resume aspirin if no more hematuria  improved
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
## 2023-01-24 NOTE — ASSESSMENT & PLAN NOTE
History of paroxysmal atrial fibrillation/flutter status post watchman procedure done in November 2022  -Plan for RICHARD tomorrow with cardiology to determine if if he can come off of anticoagulation permanently  -Holding Xarelto, cardiology agrees    S/p RICHARD this morning.  Pending reports

## 2023-01-24 NOTE — DIETARY
Nutrition services: Day 1 of admit.  Osvaldo Pate is a 72 y.o. male with admitting DX of Clot hematuria    Consult received for BMI 2: unsure wt loss, denies poor PO intake PTA. Pt currently NPO, off floor in PACU.    Assessment:  Weight: 99.8 kg (220 lb) - Estimated  Body mass index is 31.57 kg/m²., BMI classification: Obesity class I    Evaluation:   Admit w/ clot hematuria, acquired circulating anticoagulants, hx colostomy, atrial flutter, HTN, hx quadriplegia C5-C7 complete, suprapubic catheter.  PO intake when on regular diet is % x 1.  Labs reviewed.  MAR: vitamin C, ferrous sulfate, mag oxide, senna, vitamin D3  Skin: wound team consult pending for pt w/ hx of chronic decubitus ulcers, colostomy. Pt refused 4- yes skin check of sacrum area but agrees for dressing change 1/25 per RN note. No edema documented.  +BM:  1/24 via colostomy  Wt per chart hx   01/22/22: 231.7 lb  4/22/22: 240.3 lb   09/11/22: 225 lb  11/22/22: 222 lb via stand up scale  12/10/22: 224.9 lb via wheelchair scale    Malnutrition Risk: Pt denies poor PO intake PTA per admit screen. Wt changes per chart review hx are not significant. Unable to fully assess wt hx as current admit wt is estimated; appears has been stable x >/=3 months.     Recommendations/Plan:  Resume diet as tolerated s/p procedure; diet re-order to be per MD.    Encourage intake of meals >75%.  Document intake of all PO as % taken in ADL's to provide interdisciplinary communication across all shifts.   Monitor weight. Contacted nursing to obtain measured wt as able.  Nutrition rep will continue to see patient for ongoing meal and snack preferences.     RD available PRN.

## 2023-01-24 NOTE — PROGRESS NOTES
Pt is A&O x4, RA. All medications taken and tolerated, no c/o pain at this time. CBI in place w/ urine running clear. Pt continues to refuse visualization of wounds and sacrum; re-educated on need for staff to visualize wounds w/in 24 hours of admission. Pt verbalized understanding, agreed to have dressings removed tomorrow before noon. Pt resting in bed w/ call lights and belongings in reach, bed alarm on, hourly rounding in place.

## 2023-01-24 NOTE — OR NURSING
1126 Patient arrived to PACU from echo.  Report from anesthesia and RN.  Patient is awake and alert, denies pain.  CBI infusing, pale yellow urine noted to be draining into hutchins.  Patient updated on plan of care.  1142 Daughter Geovany called and updated on patient status.  1150 Report called to Corinna MENESES.    1200 Patient tolerating oral intake, denies discomfort.  1211 Patient transferred to [place] [id number] via [facility] with transport.

## 2023-01-24 NOTE — PROGRESS NOTES
4 Eyes Skin Assessment Completed by ZAIRA Weinstein and ZAIRA Multani.    Head WDL  Ears WDL  Nose WDL  Mouth WDL  Neck WDL  Breast/Chest WDL  Shoulder Blades WDL  Spine WDL  (R) Arm/Elbow/Hand WDL  (L) Arm/Elbow/Hand WDL  Abdomen WDL  Groin WDL  Scrotum/Coccyx/Buttocks Chronic pressure ulcer  (R) Leg WDL  (L) Leg chronic pressure ulcer  (R) Heel/Foot/Toe WDL  (L) Heel/Foot/Toe Chronic pressure ulcer          Devices In Places NA      Interventions In Place Heel Mepilex, Sacral Mepilex, Heel Float Boots, Pillows, Q2 Turns, and Low Air Loss Mattress    Possible Skin Injury Yes    Pictures Uploaded Into Epic N/A Refused  Wound Consult Placed Yes  RN Wound Prevention Protocol Ordered Yes
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
## 2023-01-24 NOTE — DISCHARGE PLANNING
TCN DCE chart review complete. Choice obtained for [facility] and faxed to [facility]. Thank you.

## 2023-01-25 VITALS
WEIGHT: 220 LBS | RESPIRATION RATE: 17 BRPM | DIASTOLIC BLOOD PRESSURE: 72 MMHG | TEMPERATURE: 97 F | HEART RATE: 51 BPM | OXYGEN SATURATION: 93 % | SYSTOLIC BLOOD PRESSURE: 136 MMHG | BODY MASS INDEX: 31.57 KG/M2

## 2023-01-25 LAB
ANION GAP SERPL CALC-SCNC: 7 MMOL/L (ref 7–16)
BACTERIA UR CULT: NORMAL
BUN SERPL-MCNC: 13 MG/DL (ref 8–22)
CALCIUM SERPL-MCNC: 9.4 MG/DL (ref 8.5–10.5)
CHLORIDE SERPL-SCNC: 104 MMOL/L (ref 96–112)
CO2 SERPL-SCNC: 27 MMOL/L (ref 20–33)
CREAT SERPL-MCNC: 0.71 MG/DL (ref 0.5–1.4)
ERYTHROCYTE [DISTWIDTH] IN BLOOD BY AUTOMATED COUNT: 54.6 FL (ref 35.9–50)
FERRITIN SERPL-MCNC: 534 NG/ML (ref 22–322)
GFR SERPLBLD CREATININE-BSD FMLA CKD-EPI: 97 ML/MIN/1.73 M 2
GLUCOSE SERPL-MCNC: 99 MG/DL (ref 65–99)
HCT VFR BLD AUTO: 39.8 % (ref 42–52)
HGB BLD-MCNC: 12.8 G/DL (ref 14–18)
IRON SATN MFR SERPL: 28 % (ref 15–55)
IRON SERPL-MCNC: 53 UG/DL (ref 50–180)
MAGNESIUM SERPL-MCNC: 2.3 MG/DL (ref 1.5–2.5)
MCH RBC QN AUTO: 34.2 PG (ref 27–33)
MCHC RBC AUTO-ENTMCNC: 32.2 G/DL (ref 33.7–35.3)
MCV RBC AUTO: 106.4 FL (ref 81.4–97.8)
PHOSPHATE SERPL-MCNC: 4.2 MG/DL (ref 2.5–4.5)
PLATELET # BLD AUTO: 150 K/UL (ref 164–446)
PMV BLD AUTO: 9.1 FL (ref 9–12.9)
POTASSIUM SERPL-SCNC: 4.5 MMOL/L (ref 3.6–5.5)
RBC # BLD AUTO: 3.74 M/UL (ref 4.7–6.1)
SIGNIFICANT IND 70042: NORMAL
SITE SITE: NORMAL
SODIUM SERPL-SCNC: 138 MMOL/L (ref 135–145)
SOURCE SOURCE: NORMAL
TIBC SERPL-MCNC: 186 UG/DL (ref 250–450)
UIBC SERPL-MCNC: 133 UG/DL (ref 110–370)
VIT B12 SERPL-MCNC: 394 PG/ML (ref 211–911)
WBC # BLD AUTO: 5.1 K/UL (ref 4.8–10.8)

## 2023-01-25 PROCEDURE — 99239 HOSP IP/OBS DSCHRG MGMT >30: CPT | Performed by: STUDENT IN AN ORGANIZED HEALTH CARE EDUCATION/TRAINING PROGRAM

## 2023-01-25 PROCEDURE — 83735 ASSAY OF MAGNESIUM: CPT

## 2023-01-25 PROCEDURE — 82607 VITAMIN B-12: CPT

## 2023-01-25 PROCEDURE — 83540 ASSAY OF IRON: CPT

## 2023-01-25 PROCEDURE — A9270 NON-COVERED ITEM OR SERVICE: HCPCS | Performed by: STUDENT IN AN ORGANIZED HEALTH CARE EDUCATION/TRAINING PROGRAM

## 2023-01-25 PROCEDURE — 85027 COMPLETE CBC AUTOMATED: CPT

## 2023-01-25 PROCEDURE — 82728 ASSAY OF FERRITIN: CPT

## 2023-01-25 PROCEDURE — 84100 ASSAY OF PHOSPHORUS: CPT

## 2023-01-25 PROCEDURE — 80048 BASIC METABOLIC PNL TOTAL CA: CPT

## 2023-01-25 PROCEDURE — 83550 IRON BINDING TEST: CPT

## 2023-01-25 PROCEDURE — 700102 HCHG RX REV CODE 250 W/ 637 OVERRIDE(OP)

## 2023-01-25 PROCEDURE — 700102 HCHG RX REV CODE 250 W/ 637 OVERRIDE(OP): Performed by: STUDENT IN AN ORGANIZED HEALTH CARE EDUCATION/TRAINING PROGRAM

## 2023-01-25 PROCEDURE — 97602 WOUND(S) CARE NON-SELECTIVE: CPT

## 2023-01-25 PROCEDURE — 36415 COLL VENOUS BLD VENIPUNCTURE: CPT

## 2023-01-25 PROCEDURE — A9270 NON-COVERED ITEM OR SERVICE: HCPCS

## 2023-01-25 RX ORDER — DOCUSATE SODIUM 100 MG/1
100 CAPSULE, LIQUID FILLED ORAL DAILY
Status: DISCONTINUED | OUTPATIENT
Start: 2023-01-25 | End: 2023-01-25 | Stop reason: HOSPADM

## 2023-01-25 RX ADMIN — Medication 2000 UNITS: at 04:59

## 2023-01-25 RX ADMIN — FERROUS SULFATE TAB 325 MG (65 MG ELEMENTAL FE) 325 MG: 325 (65 FE) TAB at 10:06

## 2023-01-25 RX ADMIN — SENNOSIDES AND DOCUSATE SODIUM 2 TABLET: 50; 8.6 TABLET ORAL at 04:59

## 2023-01-25 RX ADMIN — DOCUSATE SODIUM 100 MG: 100 CAPSULE, LIQUID FILLED ORAL at 06:30

## 2023-01-25 RX ADMIN — BACLOFEN 20 MG: 10 TABLET ORAL at 10:06

## 2023-01-25 RX ADMIN — OXYCODONE HYDROCHLORIDE AND ACETAMINOPHEN 1000 MG: 500 TABLET ORAL at 04:58

## 2023-01-25 RX ADMIN — BACLOFEN 20 MG: 10 TABLET ORAL at 13:54

## 2023-01-25 RX ADMIN — Medication 400 MG: at 04:59

## 2023-01-25 RX ADMIN — BACLOFEN 20 MG: 10 TABLET ORAL at 17:05

## 2023-01-25 ASSESSMENT — ENCOUNTER SYMPTOMS
FEVER: 0
HEADACHES: 0
COUGH: 0
VOMITING: 0
NAUSEA: 0
CHILLS: 0
SHORTNESS OF BREATH: 0
ABDOMINAL PAIN: 0

## 2023-01-25 ASSESSMENT — PAIN DESCRIPTION - PAIN TYPE: TYPE: ACUTE PAIN

## 2023-01-25 NOTE — CARE PLAN
The patient is Stable - Low risk of patient condition declining or worsening    Shift Goals  Clinical Goals: CBI mngmt, skin integrity, rest  Patient Goals: Rest    Progress made toward(s) clinical / shift goals: Pt CBI in place, Q2 turns.     Problem: Knowledge Deficit - Standard  Goal: Patient and family/care givers will demonstrate understanding of plan of care, disease process/condition, diagnostic tests and medications  Outcome: Progressing     Problem: Skin Integrity  Goal: Skin integrity is maintained or improved  Outcome: Progressing

## 2023-01-25 NOTE — FACE TO FACE
Face to Face Supporting Documentation - Home Health    The encounter with this patient was in whole or in part the primary reason for home health admission.    Date of encounter:   Patient:                    MRN:                       YOB: 2023  Osvaldo Pate  [id number]  1950     Home health to see patient for:  Skilled Nursing care for assessment, interventions & education, Wound Care, Home health aide, Physical Therapy evaluation and treatment, and Occupational therapy evaluation and treatment    Skilled need for:  Comment: quadriplegia    Skilled nursing interventions to include:  Wound Care and Comment: pt ot    Homebound status evidenced by:  Need the aid of supportive devices such as crutches, canes, wheelchairs or walkers. Leaving home requires a considerable and taxing effort. There is a normal inability to leave the home.    Community Physician to provide follow up care: Britni Ordonez A.P.R.N.     Optional Interventions? No      I certify the face to face encounter for this home health care referral meets the CMS requirements and the encounter/clinical assessment with the patient was, in whole, or in part, for the medical condition(s) listed above, which is the primary reason for home health care. Based on my clinical findings: the service(s) are medically necessary, support the need for home health care, and the homebound criteria are met.  I certify that this patient has had a face to face encounter by myself.  Fabiana Brewer M.D. - NPI: [id number]

## 2023-01-25 NOTE — DISCHARGE PLANNING
Received Choice form at [phone]  Agency/Facility Name: [facility]   Referral sent per Choice form @ [phone]

## 2023-01-25 NOTE — PROGRESS NOTES
Note to reader: this note follows the APSO format rather than the historical SOAP format. Assessment and plan located at the top of the note for ease of use.    Chief Complaint  72 y.o. year old male here with Blood in Urine      Assessment/Plan  Interval History   Active Hospital Problems    Diagnosis     Clot hematuria [R31.0]     Acquired circulating anticoagulants ([facility]) [D68.318]     Presence of Watchman left atrial appendage closure device- implanted 11/22/22 Dr Merino  [Z95.818]     Suprapubic catheter ([facility]) [Z93.59]     Quadriplegia, C5-C7 complete ([facility]) [G82.53]     Colostomy in place ([facility]) [Z93.3]     Macrocytic anemia [D53.9]     Paroxysmal atrial flutter ([facility]) [I48.92]     Essential hypertension [I10]       pt seen and examined     1/25 urine remains clear, h/h stable. No urologic events overnight.   CT with improvement of hyperdense material in the bladder and b/l punctate stones.     1/24- urine clearing. No urologic overnight events. H/h stable, slightly down from yesterday 12.3/27.5. Cr 0.67. hypertensive during rounds. CT-U been rescheduled 3x.     Disposition  stable   PLAN:  Will organize outpatient cystoscopy in office. No interventions warranted during this hospital stay, signing off.   Urology to follow.       Review of Systems  Physical Exam   Review of Systems   Constitutional:  Negative for chills and fever.   Respiratory:  Negative for cough and shortness of breath.    Cardiovascular:  Negative for chest pain.   Gastrointestinal:  Negative for abdominal pain, nausea and vomiting.   Genitourinary:  Positive for hematuria.   Neurological:  Negative for headaches.   All other systems reviewed and are negative.  Vitals:    01/24/23 1200 01/24/23 1928 01/25/23 0331 01/25/23 0717   BP: 97/55 92/50 95/58 124/61   Pulse: (!) 59 86 (!) 44 (!) 59   Resp: 16 16 18 17   Temp: 36.4 °C (97.6 °F) 36.2 °C (97.2 °F) 36.2 °C (97.2 °F) 36.1 °C (96.9 °F)   TempSrc: Temporal Temporal Temporal    SpO2: 93% 95%  97% 96%   Weight:         Physical Exam  Vitals and nursing note reviewed.   HENT:      Head: Normocephalic and atraumatic.      Right Ear: Tympanic membrane normal.      Left Ear: Tympanic membrane normal.   Eyes:      Pupils: Pupils are equal, round, and reactive to light.   Pulmonary:      Effort: Pulmonary effort is normal.   Genitourinary:     Comments: Spt with concentrated yellow urine, no hematuria  Neurological:      Mental Status: He is oriented to person, place, and time.   Psychiatric:         Mood and Affect: Mood normal.         Behavior: Behavior normal.         Thought Content: Thought content normal.        Hematology Chemistry   Lab Results   Component Value Date/Time    WBC 5.1 01/25/2023 02:00 AM    HEMOGLOBIN 12.8 (L) 01/25/2023 02:00 AM    HEMATOCRIT 39.8 (L) 01/25/2023 02:00 AM    PLATELETCT 150 (L) 01/25/2023 02:00 AM     Lab Results   Component Value Date/Time    SODIUM 138 01/25/2023 02:00 AM    POTASSIUM 4.5 01/25/2023 02:00 AM    CHLORIDE 104 01/25/2023 02:00 AM    CO2 27 01/25/2023 02:00 AM    GLUCOSE 99 01/25/2023 02:00 AM    BUN 13 01/25/2023 02:00 AM    CREATININE 0.71 01/25/2023 02:00 AM         Labs not explicitly included in this progress note were reviewed by the author.   Radiology/imaging not explicitly included in this progress note was reviewed by the author.     Core Measures
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
## 2023-01-25 NOTE — DISCHARGE PLANNING
Transport Request Received:1/25/2023 at 1329    Transport Company: [facility]     Transport Date: 1/25/2023  Time: 1700    Destination: Home at [address] [facility] [place]    Notified care team of scheduled transport via Voalte.      If there are any changes needed to the DC transportation scheduled, please contact [facility] at ext. [phone] between the hours of 1033-9042 Mon-Fri. If outside those hours, contact the ED Case Manager at ext. [phone].

## 2023-01-25 NOTE — DISCHARGE PLANNING
Agency/Facility Name: [facility]  Spoke To: Inderjit  Outcome: [facility] will resume home health services.         normal balance

## 2023-01-25 NOTE — PROGRESS NOTES
"EP Note    Asked by Dr Mehta to answer question of anticoagulation in this past who is s/p TOMI-C.    He was sent for a RICHARD to assess for significant shira-device leak. RICHARD report showed a \"small\" leak although it is unclear if this was measured, RICHARD performed by Dr Anaya. Of note, <5mm is acceptable for discontinuation of OAC.    I reached out to Dr Anaya for clarification. I asked Dr Mehta if this can be assessed and measured for leak but if in the opinion of the consulting cardiology service the images obtained are insufficient to measure leak, he will need to be sent for a cardiac CT to evaluate for any significant shira-device flow.    Elías Merino MD  Cardiac Electrophysiology    ADDENDUM:  I discussed with Dr Jaime Nieto who graciously agreed to evaluate the RICHARD images in preliminary fashion. He is of the opinion that there is no leak >5mm.    Please discharge on ASA 81mg, can stop Xarelto.    "

## 2023-01-25 NOTE — PROGRESS NOTES
Pt is A&O x4, RA. All medications taken and tolerated, no c/o pain at this time. CBI in place w/ urine running clear. Pt resting in bed w/ call lights and belongings in reach, bed alarm on, hourly rounding in place. No additional needs at this time.

## 2023-01-25 NOTE — DISCHARGE INSTRUCTIONS
- Follow up with primary care physician in 1 week.   - Follow up with urology as instructed  - Follow up with cardiology as instructed  - Please take the medications as instructed    - Go to the local Emergency Department if you have any worsening condition.

## 2023-01-25 NOTE — CARE PLAN
The patient is Stable - Low risk of patient condition declining or worsening    Shift Goals  Clinical Goals: CMI management, skin integrity, discharge  Patient Goals: rest, discharge  Family Goals: erin    Progress made toward(s) clinical / shift goals:    Problem: Knowledge Deficit - Standard  Goal: Patient and family/care givers will demonstrate understanding of plan of care, disease process/condition, diagnostic tests and medications  Outcome: Progressing     Problem: Skin Integrity  Goal: Skin integrity is maintained or improved  Outcome: Progressing     Problem: Pain - Standard  Goal: Alleviation of pain or a reduction in pain to the patient’s comfort goal  Outcome: Progressing
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
## 2023-01-26 NOTE — DISCHARGE PLANNING
Agency/Facility Name: [facility]  Spoke To: Yumiko  Outcome: DPA called to confirm the address [facility] has for transport.  [facility] is transporting to:  [id number] [facility] [address].  This address is on the pt's face sheet.  [facility] chart note has Home at [address] [place] [address].

## 2023-01-26 NOTE — PROGRESS NOTES
Exchanged 3 way suprapubic catheter to home 24Fr 2-way-catheter with 10cc balloon. All DC paperwork reviewed with patient. Patient stated he had no follow-up questions.

## 2023-01-27 ENCOUNTER — OFFICE VISIT (OUTPATIENT)
Dept: WOUND CARE | Facility: MEDICAL CENTER | Age: 73
End: 2023-01-27
Attending: INTERNAL MEDICINE
Payer: MEDICARE

## 2023-01-27 VITALS
SYSTOLIC BLOOD PRESSURE: 104 MMHG | HEART RATE: 56 BPM | RESPIRATION RATE: 17 BRPM | DIASTOLIC BLOOD PRESSURE: 64 MMHG | TEMPERATURE: 97.5 F | OXYGEN SATURATION: 96 %

## 2023-01-27 DIAGNOSIS — T81.31XD POSTOPERATIVE WOUND DEHISCENCE, SUBSEQUENT ENCOUNTER: ICD-10-CM

## 2023-01-27 DIAGNOSIS — T14.8XXA DEEP TISSUE INJURY: ICD-10-CM

## 2023-01-27 DIAGNOSIS — G82.53 QUADRIPLEGIA, C5-C7, COMPLETE (HCC): Primary | ICD-10-CM

## 2023-01-27 DIAGNOSIS — L89.890 PRESSURE INJURY OF LEFT LEG, UNSTAGEABLE (HCC): ICD-10-CM

## 2023-01-27 DIAGNOSIS — L89.154 SACRAL DECUBITUS ULCER, STAGE IV (HCC): ICD-10-CM

## 2023-01-27 DIAGNOSIS — L89.623 PRESSURE INJURY OF LEFT HEEL, STAGE 3 (HCC): ICD-10-CM

## 2023-01-27 PROCEDURE — 17250 CHEM CAUT OF GRANLTJ TISSUE: CPT

## 2023-01-27 PROCEDURE — 11043 DBRDMT MUSC&/FSCA 1ST 20/<: CPT

## 2023-01-27 PROCEDURE — 17250 CHEM CAUT OF GRANLTJ TISSUE: CPT | Mod: 59 | Performed by: STUDENT IN AN ORGANIZED HEALTH CARE EDUCATION/TRAINING PROGRAM

## 2023-01-27 PROCEDURE — 11043 DBRDMT MUSC&/FSCA 1ST 20/<: CPT | Performed by: STUDENT IN AN ORGANIZED HEALTH CARE EDUCATION/TRAINING PROGRAM

## 2023-01-27 ASSESSMENT — ENCOUNTER SYMPTOMS
FEVER: 0
CHILLS: 0

## 2023-01-27 NOTE — PATIENT INSTRUCTIONS
-Keep your wound dressing clean, dry, and intact.    -Change your dressing if it becomes soiled, soaked, or falls off.    -Should you experience any significant changes in your wound(s), such as infection (redness, swelling, localized heat, increased pain, fever > 101 F, chills) or have any questions regarding your home care instructions, please contact the wound center at [phone]. If after hours, contact your primary care physician or go to the hospital emergency room.

## 2023-01-27 NOTE — PROGRESS NOTES
Provider Encounter- Pressure Injury        HISTORY OF PRESENT ILLNESS  Wound History:    START OF CARE IN CLINIC: 5/3/2022    REFERRING PROVIDER: Sahara Mendez      WOUNDS- Pressure Injury, Sacrococcygeal, stage IV                                                             Surgical wound dehiscence, left medial lower leg-status post surgical I&D of stage IV pressure injury and           biologic application                    Pressure injury, DTI, left posterior lower leg-first observed in clinic 7/29/2022       Pressure injury stage II L heel - first noted 10/21     Right 2nd toe avulsion - first noted 10/21     Skin tag left posterior ear - first noted 10/21     HISTORY: Patient with history of incomplete quadriplegia referred to [facility] [place] for treatment of a stage IV pressure injury.  He has a history of previous pressure injuries to this area, and underwent muscle flaps in 2019, and then again in 2020.  He was seen in the wound clinic in November 2021 for an ulcer proximal from his current ulcer, and pressure injuries to his left posterior lower leg and left heel.  At that time, it was discovered that the patient had retained VAC foam embedded in the wound bed of the sacral wound.  Attempts were made to get him back to his plastic surgeon, though unsuccessful.  In January he underwent surgical removal of VAC sponge along with excisional debridement of his sacral wound by Dr. Chaves.  After the surgery, his wound went on to heal without incident.   In early April 2022, his home health nurse noted a new sacral ulcer, below the previous ulcer which quickly tripled in size over the following weeks.  The ulcer to his left medial lower leg had also deteriorated, with bone visible at the base..  He was hospitalized from 4/22 until 4/27/2022 and underwent surgery with Dr. Raman on 4/26 for irrigation and debridement of multiple compartments of the left lower extremity, bone excision, and complex closure of chronic wound  using biologic skin substitute.   His sacrococcygeal wound was not surgically addressed during this admission.  He was discharged back to his group home, with home health, and referral to outpatient wound clinic for his sacral wound.  He was instructed to follow-up with his surgeon for his lower leg wound.       Postoperatively, the left medial lower leg incision dehisced.  He was seen by his surgeon at [facility] on 5/11.  The surgeon opted to leave remaining sutures in place, and refer him to the wound clinic for treatment of this wound.   Treatment of this wound was initiated in clinic on 5/12.  During this visit was also noted that his heel DTI had resolved, but that he had a new pressure injury to his left posterior lower extremity.     A new pressure injury was noted to patient's right upper buttock/lower back on 5/20/2022.  Wound was linear in shape, skin discolored but intact.     Abrasion noted to left anterior lower leg.  First observed in clinic on 7/22/2022.  Patient states he bumped his leg into his food tray.     Small DTI noted to patient's left lateral lower leg on 7/29/2022.  Skin intact but discolored.     Large area of deep tissue injury noted to patient's left exterior lower leg.  Patient denied any trauma to this area.  Skin intact.  Wound documented.    1/27/2023: Patient was admitted to [facility] [place] from 1/23-1/25/2023 with gross hematuria. He underwent RICHARD which showed watchman device was in place and he was taken off of Xarelto. While hospitalized wound team was consulted. He was referred back to [facility] and home health upon discharge.    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   Never used smokeless tobacco    Patient's problem list, allergies, and current medications reviewed and updated in Epic    Interval History:  Interval History thinned 7/29/2022.  Please see previous notes for complete interval history.     Interval History thinned 1/27/2023. Please see previous note for complete interval history.    1/27/2023: Clinic visit with Steve Hodges MD. Patient returns to [facility] following admission for hematuria, he ha RICHARD and watchman was in an acceptable position and cardiology recommended he stop Xeralto. Patient reports no further bleeding or hematuria since discontinued. Following cauterization of left lower leg hypergranulation tissue last clinic appears improved. His sacrum appears stable. Has appointment with Dr. Raman on 2/6 and does not have appointment with Dr. Saini.       REVIEW OF SYSTEMS:   Review of Systems   Constitutional:  Negative for chills, fever and malaise/fatigue.   Skin:         Left lower extremity wound  Left heel pressure injury resolving  Stage IV pressure injury sacrum       PHYSICAL EXAMINATION:   /64   Pulse (!) 56   Temp 36.4 °C (97.5 °F) (Temporal)   Resp 17   SpO2 96%   Physical Exam  Constitutional:       Appearance: He is obese.   Cardiovascular:      Rate and Rhythm: Bradycardia present.   Pulmonary:      Effort: Pulmonary effort is normal. No respiratory distress.      Breath sounds: No wheezing.   Skin:     Comments: Stage IV coccygeal pressure injury - Wound measures roughly the same, improved granulation tissue and coverage over bone, slight increase in slough.  No odor or purulence.    Full-thickness wound to left medial lower leg - Wound responded well to silver nitrate cautery, Less hypergranular. Tissue quality remains poor and boggy and probes to bone.  Moderate serosanguineous drainage, no odor, no periwound erythema or induration.      Stage III pressure injury left posterior heel - Appears resolved with thin fragile  epithelium  Left posterior/lateral lower leg wounds- small opening    Refer to wound flowsheet and photos   Neurological:      Mental Status: He is alert and oriented to person, place, and time.       WOUND ASSESSMENT  Wound 04/22/22 Pressure Injury Sacrum POA stage 4 (Active)   Wound Image    01/27/23 1400   Site Assessment Red;Yellow 01/27/23 1400   Periwound Assessment Scar tissue;Fragile 01/27/23 1400   Margins Unattached edges 01/27/23 1400   Drainage Amount Large 01/27/23 1400   Drainage Description Serosanguineous 01/27/23 1400   Treatments Cleansed;Provider debridement 01/27/23 1400   Wound Cleansing Normal Saline Irrigation 01/27/23 1400   Periwound Protectant Skin Protectant Wipes to Periwound;Barrier Paste 01/27/23 1400   Dressing Cleansing/Solutions Not Applicable 01/27/23 1400   Dressing Options Hydrofiber Silver Strip;Dry Gauze;Mepilex 01/27/23 1400   Dressing Changed Changed 01/27/23 1400   Dressing Change/Treatment Frequency Monday, Wednesday, Friday, and As Needed 01/27/23 1400   WOUND NURSE ONLY - Pressure Injury Stage 4 01/27/23 1400   Wound Length (cm) 1.5 cm 01/27/23 1400   Wound Width (cm) 1.6 cm 01/27/23 1400   Wound Depth (cm) 1.5 cm 01/27/23 1400   Wound Surface Area (cm^2) 2.4 cm^2 01/27/23 1400   Wound Volume (cm^3) 3.6 cm^3 01/27/23 1400   Post-Procedure Length (cm) 1.5 cm 01/27/23 1400   Post-Procedure Width (cm) 1.6 cm 01/27/23 1400   Post-Procedure Depth (cm) 1.6 cm 01/27/23 1400   Post-Procedure Surface Area (cm^2) 2.4 cm^2 01/27/23 1400   Post-Procedure Volume (cm^3) 3.84 cm^3 01/27/23 1400   Wound Healing % -50 01/27/23 1400   Tunneling (cm) 0 cm 01/27/23 1400   Tunneling Clock Position of Wound 9 11/04/22 1300   Undermining (cm) 2 cm 01/27/23 1400   Undermining of Wound, 1st Location From 7 o'clock;To 5 o'clock 01/27/23 1400   Wound Odor None 01/27/23 1400   Exposed Structures Other (Comments) 01/27/23 1400   Number of days: 280       Wound 11/11/22 LLE Medial (anterior and  posterior merged) (Active)   Wound Image    01/27/23 1400   Site Assessment Red;Boggy;Fragile 01/27/23 1400   Periwound Assessment Fragile;Scar tissue;Red 01/27/23 1400   Margins Unattached edges;Attached edges 01/27/23 1400   Drainage Amount Moderate 01/27/23 1400   Drainage Description Serosanguineous 01/27/23 1400   Treatments Cleansed;Silver nitrate 01/27/23 1400   Wound Cleansing Normal Saline Irrigation 01/27/23 1400   Periwound Protectant Skin Protectant Wipes to Periwound;Adaptic 01/27/23 1400   Dressing Cleansing/Solutions Not Applicable 01/27/23 1400   Dressing Options Adaptic;Hydrofera Blue Ready;Mepilex;Tubigrip 01/27/23 1400   Dressing Changed Changed 01/27/23 1400   Dressing Change/Treatment Frequency Monday, Wednesday, Friday, and As Needed 01/27/23 1400   Non-staged Wound Description Full thickness 01/27/23 1400   Wound Length (cm) 2.2 cm 01/27/23 1400   Wound Width (cm) 3.3 cm 01/27/23 1400   Wound Depth (cm) 0.2 cm 01/27/23 1400   Wound Surface Area (cm^2) 7.26 cm^2 01/27/23 1400   Wound Volume (cm^3) 1.452 cm^3 01/27/23 1400   Post-Procedure Length (cm) 0.2 cm 01/20/23 1500   Post-Procedure Width (cm) 3.5 cm 01/13/23 1400   Post-Procedure Depth (cm) 1 cm 12/30/22 1400   Post-Procedure Surface Area (cm^2) 8.75 cm^2 01/13/23 1400   Post-Procedure Volume (cm^3) 8.75 cm^3 12/30/22 1400   Wound Healing % -116 01/27/23 1400   Tunneling (cm) 0 cm 01/27/23 1400   Tunneling Clock Position of Wound 9 11/11/22 1300   Undermining (cm) 0 cm 01/27/23 1400   Wound Odor None 01/27/23 1400   Exposed Structures Other (Comments) 01/27/23 1400   Number of days: 77       PROCEDURE:  Debridement of sacral wound  -Lidocaine declined, he is insensate  -Curette used to debride wound bed.  Excisional debridement was performed to remove devitalized tissue until healthy, bleeding tissue was visualized.   Entire surface of wound, 2.4 cm2 debrided.  Tissue debrided into the muscle / fascia layer  -Bleeding controlled with  manual pressure.    -Wound care completed by wound RN, refer to flowsheet  -Patient tolerated the procedure well, without c/o pain or discomfort.    PROCEDURE: Repeated chemical cautery of hypergranulation tissue left lower extremity wound  - Friable boggy hypergranular tissue noted  - Used silver nitrate to chemically cauterize tissue  - No significant bleeding noted    BIOLOGIC LOG  5/20/2022-first application.  PRODUCT: EpiCord 2 x 3 cm . PRODUCT #[id number]; EXPIRES: 2026-12-01 5/27/2022- second application.  PRODUCT: EpiCordEX 2 x 3 cm . PRODUCT #[id number]; EXPIRES: 2026-09-01    6/3/2022- third application.  PRODUCT: EpiCordEX 2 x 3 cm . PRODUCT #[id number]; EXPIRES: 2026-10-01    6/10/2022- fourth application.  PRODUCT: EpiCordEX 2 x 3 cm . PRODUCT #EX [id number]; EXPIRES: 2026-09-01 6/17/2022- fifth application.  PRODUCT: EpiCordEX 2 x 3 cm . PRODUCT #EX [id number]; EXPIRES: 2027-02-01 6/24/2022- sixth application.  PRODUCT: EpiCordEX 2 x 3 cm . PRODUCT #[id number]; EXPIRES: 2027-02-01 07/01/22: Seventh application.  Product: Epicort Dx 2.0 x 3.0 cm reorder number [id number]; product number [id number]; expires 2027-02-01 7/22/2022- eighth application.  PRODUCT: EpiCord 2 x 3 cm, reorder #[id number]. PRODUCT #[id number]; EXPIRES: 2027-02-01      Pertinent Labs and Diagnostics:    Labs:     A1c: No results found for: HBA1C     [facility] results, 7/1/2022 (under media tab)    CRP 13    ESR 31      IMAGING:     10/3/2022-CT of pelvis with contrast  IMPRESSION:     1.  Posterior soft tissue sacral wound and 1.5 x 3.7 cm abscess.   2.  Progressive destruction of the distal sacrum and proximal coccyx. Sclerosis and irregularity of the distal sacrum consistent with osteomyelitis.   3.  Old wound within the soft tissues posterior to the distal left SI joint with possible fistulous communication.    10/3/2022-CT of tib-fib with and without contrast, with  reconstruction  IMPRESSION:     1.  Old fractures of the left tibia and fibula with extensive callus formation and bony bridging as well as extensive dystrophic calcifications. Similar to previous.   2.  Distal medial soft tissue wounds with ill-defined superficial in the soft tissue thickening and fluid collections suggestive of phlegmon. No organized abscess identified.   3.  No definite osteomyelitis.   4.  Arthritic changes.        VASCULAR STUDIES: No results found.    LAST  WOUND CULTURE:   Lab Results   Component Value Date/Time    CULTRSULT  01/23/2023 11:10 AM     Mixed enteric ade ,000 cfu/mL  Greater than 3 organisms isolated, culture of doubtful  significance, please recollect.            ASSESSMENT AND PLAN:     1. Sacral decubitus ulcer, stage IV ([facility])  Comments: Ulcer first noted in early April 2022 as small open area which quickly enlarged.  This ulcer is present distal from previous sacral ulcer which healed after surgery in January.  Patient has history of flap reconstruction x2 to this area.  CT shows progressive destruction of distal sacrum and proximal coccyx.    1/27/2023: Measures slightly smaller, improved coverage over bone. Continues to have thin slough to wound bed  - Excisional debridement was performed in clinic, medically necessary to promote wound healing.  -Patient is to return to clinic weekly for assessment and debridement   - Wound has improved without wound VAC, he sent device back to   - Patient does not currently have follow up with Dr. Saini. If wound fails to improve can consider re-establishing with Dr. Saini for evaluation for coverage options.    Wound care: Hydrofiber silver strip, hydrofiber silver, Mepilex    2. Postoperative wound dehiscence, subsequent encounter  Comments: On 4/26 patient underwent irrigation and debridement of multiple compartments of the left lower extremity states for pressure injury, with bone excision, and complex closure of  chronic wound using biologic skin substitute.  During postop visit in surgeons office on 5/11, surgical site was noted to be dehisced.  Patient was referred to wound clinic for management.    1/27/2023: Wound tissue remains poor, friable and boggy and wound probes to bone. Less hypergranulation noted  - Chemical cautery of hypergranulation tissue today in clinic  -Patient to return to clinic weekly for assessment and debridement as needed  -Home health to change dressing 1-2 times per week in between clinic visits   -Patient to follow-up with Dr. Raman 2/6  -Consider resuming biologic and/or VAC, do not think that tissue quality is sufficient for biologic.  -I suspect pressure may be an issue still.  Patient states he is wearing Prevalon boot at all times    Wound care: Hydrofera Blue, Mepilex    3. Deep tissue injury  4. Pressure injury of left leg, unstageable ([facility])  Comments: DTI to posterior left lower leg first observed in clinic 7/29/2022.  Patient does not know how it started, had been wearing heel float boot consistently.    1/27/2023: Appears closed today  -Continue to monitor for any deterioration in tissue quality  -Patient is currently wearing Prevalon boots to help prevent pressure injuries to bilateral lower extremities    Wound care: Mepilex, Tubigrip D    5. Pressure injury of left heel, stage 3 ([facility])  Comments: First noted in clinic on 10/21/2022, originally noted to be stage II    1/27/2023: Wounds remain resolved with thin layer of epithelium  - No debridement required  - Patient reports wearing Prevalon boots 24 hours a day. He is clear having pressure to heel and recommend offloading heels off mattress.     Wound Care: Heel Mepilex to reduce pressure and friction    6. Quadriplegia, C5-C7, complete ([facility])  Comments: Complicating factor.  Impaired mobility and sensation  -Patient is still spending 7-8 hours/day up in his wheelchair, though he does have appropriate offloading cushion, and knows  to reposition frequently.  Wears heel float boots bilaterally at all times      Please note that this note may have been created using voice recognition software. I have worked with technical experts from [facility], [facility] to optimize the interface.  I have made every reasonable attempt to correct obvious errors, but there may be errors of grammar and possibly content that I did not discover before finalizing the note.

## 2023-02-03 ENCOUNTER — OFFICE VISIT (OUTPATIENT)
Dept: WOUND CARE | Facility: MEDICAL CENTER | Age: 73
End: 2023-02-03
Attending: INTERNAL MEDICINE
Payer: MEDICARE

## 2023-02-03 VITALS
SYSTOLIC BLOOD PRESSURE: 115 MMHG | OXYGEN SATURATION: 94 % | HEART RATE: 54 BPM | DIASTOLIC BLOOD PRESSURE: 60 MMHG | RESPIRATION RATE: 18 BRPM | TEMPERATURE: 98.1 F

## 2023-02-03 DIAGNOSIS — G82.53 QUADRIPLEGIA, C5-C7, COMPLETE (HCC): ICD-10-CM

## 2023-02-03 DIAGNOSIS — T81.31XD POSTOPERATIVE WOUND DEHISCENCE, SUBSEQUENT ENCOUNTER: ICD-10-CM

## 2023-02-03 DIAGNOSIS — T14.8XXA DEEP TISSUE INJURY: ICD-10-CM

## 2023-02-03 DIAGNOSIS — L89.890 PRESSURE INJURY OF LEFT LEG, UNSTAGEABLE (HCC): ICD-10-CM

## 2023-02-03 DIAGNOSIS — L89.154 SACRAL DECUBITUS ULCER, STAGE IV (HCC): ICD-10-CM

## 2023-02-03 DIAGNOSIS — L89.623 PRESSURE INJURY OF LEFT HEEL, STAGE 3 (HCC): ICD-10-CM

## 2023-02-03 PROCEDURE — 11043 DBRDMT MUSC&/FSCA 1ST 20/<: CPT

## 2023-02-03 PROCEDURE — 11043 DBRDMT MUSC&/FSCA 1ST 20/<: CPT | Performed by: NURSE PRACTITIONER

## 2023-02-03 NOTE — PATIENT INSTRUCTIONS
-Keep your wound dressing clean, dry, and intact.    -Change your dressing if it becomes soiled, soaked, or falls off.    -Should you experience any significant changes in your wound(s), such as infection (redness, swelling, localized heat, increased pain, fever > 101 F, chills) or have any questions regarding your home care instructions, please contact the wound center at [phone]. If after hours, contact your primary care physician or go to the hospital emergency room.

## 2023-02-03 NOTE — PROGRESS NOTES
Home health orders routed to [facility] home health via [employer] system.

## 2023-02-03 NOTE — PROGRESS NOTES
Provider Encounter- Pressure Injury        HISTORY OF PRESENT ILLNESS  Wound History:    START OF CARE IN CLINIC: 5/3/2022    REFERRING PROVIDER: Shaara Mendez      WOUNDS- Pressure Injury, Sacrococcygeal, stage IV                                                             Surgical wound dehiscence, left medial lower leg-status post surgical I&D of stage IV pressure injury and           biologic application                    Pressure injury, DTI, left posterior lower leg-first observed in clinic 7/29/2022       Pressure injury stage II L heel - first noted 10/21     Right 2nd toe avulsion - first noted 10/21     Skin tag left posterior ear - first noted 10/21     HISTORY: Patient with history of incomplete quadriplegia referred to [facility] for treatment of a stage IV pressure injury.  He has a history of previous pressure injuries to this area, and underwent muscle flaps in 2019, and then again in 2020.  He was seen in the wound clinic in November 2021 for an ulcer proximal from his current ulcer, and pressure injuries to his left posterior lower leg and left heel.  At that time, it was discovered that the patient had retained VAC foam embedded in the wound bed of the sacral wound.  Attempts were made to get him back to his plastic surgeon, though unsuccessful.  In January he underwent surgical removal of VAC sponge along with excisional debridement of his sacral wound by Dr. Chaves.  After the surgery, his wound went on to heal without incident.   In early April 2022, his home health nurse noted a new sacral ulcer, below the previous ulcer which quickly tripled in size over the following weeks.  The ulcer to his left medial lower leg had also deteriorated, with bone visible at the base..  He was hospitalized from 4/22 until 4/27/2022 and underwent surgery with Dr. Raman on 4/26 for irrigation and debridement of multiple compartments of the left lower extremity, bone excision, and complex closure of chronic wound  using biologic skin substitute.   His sacrococcygeal wound was not surgically addressed during this admission.  He was discharged back to his group home, with home health, and referral to outpatient wound clinic for his sacral wound.  He was instructed to follow-up with his surgeon for his lower leg wound.       Postoperatively, the left medial lower leg incision dehisced.  He was seen by his surgeon at [facility] on 5/11.  The surgeon opted to leave remaining sutures in place, and refer him to the wound clinic for treatment of this wound.   Treatment of this wound was initiated in clinic on 5/12.  During this visit was also noted that his heel DTI had resolved, but that he had a new pressure injury to his left posterior lower extremity.     A new pressure injury was noted to patient's right upper buttock/lower back on 5/20/2022.  Wound was linear in shape, skin discolored but intact.     Abrasion noted to left anterior lower leg.  First observed in clinic on 7/22/2022.  Patient states he bumped his leg into his food tray.     Small DTI noted to patient's left lateral lower leg on 7/29/2022.  Skin intact but discolored.     Large area of deep tissue injury noted to patient's left exterior lower leg.  Patient denied any trauma to this area.  Skin intact.  Wound documented.    1/27/2023: Patient [place] [facility] [place]1/25/2023 with gross hematuria. He underwent RICHADR which showed watchman device was in place and he was taken off of Xarelto. While hospitalized wound team was consulted. He was referred back to [facility] and home health upon discharge.    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   Never used smokeless tobacco    Patient's problem list, allergies, and current medications reviewed and updated in Epic    Interval History:  Interval History thinned 7/29/2022.  Please see previous notes for complete interval history.     Interval History thinned 1/27/2023. Please see previous note for complete interval history.    1/27/2023: Clinic visit with Steve Hodges MD. Patient returns to [facility] following admission for hematuria, he ha RICHARD and watchman was in an acceptable position and cardiology recommended he stop Xeralto. Patient reports no further bleeding or hematuria since discontinued. Following cauterization of left lower leg hypergranulation tissue last clinic appears improved. His sacrum appears stable. Has appointment with Dr. Raman on 2/6 and does not have appointment with Dr. Saini.    2/3/2023 : Clinic visit with ADILSON Arthur, FNP-BC, CWDINESHN, CFCN.  Turk states he is feeling very well.  Bladder issues have resolved, no further bleeding.  He has an appointment to see Dr. Raman next week for his left lower leg wound.  Home health had reported to us yesterday that patient's blood pressure was quite low.  His BP is normal today.       REVIEW OF SYSTEMS:   Unchanged from previous clinic visit on 1/27/2023, except as documented in interval history above      PHYSICAL EXAMINATION:   /60   Pulse (!) 54   Temp 36.7 °C (98.1 °F) (Temporal)   Resp 18   SpO2 94%   Physical Exam  Constitutional:       Appearance: He is obese.   Cardiovascular:      Rate and Rhythm: Bradycardia present.   Pulmonary:      Effort: Pulmonary effort is normal. No respiratory distress.      Breath sounds: No wheezing.   Skin:     Comments: Stage IV coccygeal pressure injury - Wound measures roughly the same, improved granulation tissue and coverage over bone, slight increase in slough.  No odor or purulence.    Full-thickness wound to left medial lower leg - Wound responded well to silver nitrate cautery, Less  hypergranular. Tissue quality remains poor and boggy and probes to bone.  Moderate serosanguineous drainage, no odor, no periwound erythema or induration.      Stage III pressure injury left posterior heel - Appears resolved with thin fragile epithelium  Left posterior/lateral lower leg wounds- small opening    Refer to wound flowsheet and photos   Neurological:      Mental Status: He is alert and oriented to person, place, and time.       WOUND ASSESSMENT  Wound 04/22/22 Pressure Injury Sacrum POA stage 4 (Active)   Wound Image    02/03/23 1400   Site Assessment Red;Yellow;Other (Comment) 02/03/23 1400   Periwound Assessment Scar tissue;Fragile 02/03/23 1400   Margins Unattached edges 02/03/23 1400   Drainage Amount Large 02/03/23 1400   Drainage Description Serosanguineous 02/03/23 1400   Treatments Cleansed;Provider debridement 02/03/23 1400   Wound Cleansing Normal Saline Irrigation 02/03/23 1400   Periwound Protectant Skin Protectant Wipes to Periwound;Barrier Paste 02/03/23 1400   Dressing Cleansing/Solutions Not Applicable 02/03/23 1400   Dressing Options Hydrofiber Silver Strip;Hydrofiber Silver;Silicone Adhesive Foam 02/03/23 1400   Dressing Changed Changed 02/03/23 1400   Dressing Change/Treatment Frequency Monday, Wednesday, Friday, and As Needed 02/03/23 1400   WOUND NURSE ONLY - Pressure Injury Stage 4 02/03/23 1400   Wound Length (cm) 1.2 cm 02/03/23 1400   Wound Width (cm) 1.6 cm 02/03/23 1400   Wound Depth (cm) 1.5 cm 02/03/23 1400   Wound Surface Area (cm^2) 1.92 cm^2 02/03/23 1400   Wound Volume (cm^3) 2.88 cm^3 02/03/23 1400   Post-Procedure Length (cm) 1.2 cm 02/03/23 1400   Post-Procedure Width (cm) 1.6 cm 02/03/23 1400   Post-Procedure Depth (cm) 1.6 cm 02/03/23 1400   Post-Procedure Surface Area (cm^2) 1.92 cm^2 02/03/23 1400   Post-Procedure Volume (cm^3) 3.072 cm^3 02/03/23 1400   Wound Healing % -20 02/03/23 1400   Tunneling (cm) 0 cm 02/03/23 1400   Tunneling Clock Position of Wound 9  11/04/22 1300   Undermining (cm) 1.9 cm 02/03/23 1400   Undermining of Wound, 1st Location From 7 o'clock;To 9 o'clock 02/03/23 1400   Wound Odor None 02/03/23 1400   Exposed Structures Fascia;Other (Comments) 02/03/23 1400   Number of days: 287       Wound 11/11/22 LLE Medial (anterior and posterior merged) (Active)   Wound Image   02/03/23 1400   Site Assessment Red;Boggy;Fragile 02/03/23 1400   Periwound Assessment Fragile;Scar tissue;Red 02/03/23 1400   Margins Unattached edges;Attached edges 02/03/23 1400   Drainage Amount Moderate 02/03/23 1400   Drainage Description Serosanguineous 02/03/23 1400   Treatments Cleansed;Site care 02/03/23 1400   Wound Cleansing Puracyn Valentine 02/03/23 1400   Periwound Protectant Skin Protectant Wipes to Periwound;Barrier Paste;Adaptic 02/03/23 1400   Dressing Cleansing/Solutions Not Applicable 02/03/23 1400   Dressing Options Adaptic;Hydrofera Blue Ready;Silicone Adhesive Foam;Tubigrip 02/03/23 1400   Dressing Changed Changed 02/03/23 1400   Dressing Change/Treatment Frequency Monday, Wednesday, Friday, and As Needed 02/03/23 1400   Non-staged Wound Description Full thickness 02/03/23 1400   Wound Length (cm) 2.6 cm 02/03/23 1400   Wound Width (cm) 2.7 cm 02/03/23 1400   Wound Depth (cm) 0.3 cm 02/03/23 1400   Wound Surface Area (cm^2) 7.02 cm^2 02/03/23 1400   Wound Volume (cm^3) 2.106 cm^3 02/03/23 1400   Post-Procedure Length (cm) 0.2 cm 01/20/23 1500   Post-Procedure Width (cm) 3.5 cm 01/13/23 1400   Post-Procedure Depth (cm) 1 cm 12/30/22 1400   Post-Procedure Surface Area (cm^2) 8.75 cm^2 01/13/23 1400   Post-Procedure Volume (cm^3) 8.75 cm^3 12/30/22 1400   Wound Healing % -213 02/03/23 1400   Tunneling (cm) 0 cm 02/03/23 1400   Tunneling Clock Position of Wound 9 11/11/22 1300   Undermining (cm) 0 cm 02/03/23 1400   Wound Odor None 02/03/23 1400   Exposed Structures Other (Comments) 02/03/23 1400   Number of days: 84       PROCEDURE:  Debridement of sacral  wound  -Lidocaine declined, he is insensate  -Curette used to debride wound bed.  Excisional debridement was performed to remove devitalized tissue until healthy, bleeding tissue was visualized.  Total area debrided approximate 5 cm², including into undermined area.  Tissue debrided into the muscle / fascia layer  -Bleeding controlled with manual pressure.    -Wound care completed by wound RN, refer to flowsheet  -Patient tolerated the procedure well, without c/o pain or discomfort.    PROCEDURE: Repeated chemical cautery of hypergranulation tissue left lower extremity wound  - Friable boggy hypergranular tissue noted  - Used silver nitrate to chemically cauterize tissue  - No significant bleeding noted    BIOLOGIC LOG  5/20/2022-first application.  PRODUCT: EpiCord 2 x 3 cm . PRODUCT #[id number]; EXPIRES: 2026-12-01 5/27/2022- second application.  PRODUCT: EpiCordEX 2 x 3 cm . PRODUCT #[id number]; EXPIRES: 2026-09-01    6/3/2022- third application.  PRODUCT: EpiCordEX 2 x 3 cm . PRODUCT #EX [id number]; EXPIRES: 2026-10-01    6/10/2022- fourth application.  PRODUCT: EpiCordEX 2 x 3 cm . PRODUCT #EX [id number]; EXPIRES: 2026-09-01 6/17/2022- fifth application.  PRODUCT: EpiCordEX 2 x 3 cm . PRODUCT #EX [id number]; EXPIRES: 2027-02-01 6/24/2022- sixth application.  PRODUCT: EpiCordEX 2 x 3 cm . PRODUCT #EX [id number]; EXPIRES: 2027-02-01 07/01/22: Seventh application.  Product: Epicort Dx 2.0 x 3.0 cm reorder number [id number]; product number [id number]; expires 2027-02-01 7/22/2022- eighth application.  PRODUCT: EpiCord 2 x 3 cm, reorder #[id number]. PRODUCT #[id number]; EXPIRES: 2027-02-01      Pertinent Labs and Diagnostics:    Labs:     A1c: No results found for: HBA1C     [facility] results, 7/1/2022 (under media tab)    CRP 13    ESR 31      IMAGING:     10/3/2022-CT of pelvis with contrast  IMPRESSION:     1.  Posterior soft tissue sacral wound and 1.5 x  3.7 cm abscess.   2.  Progressive destruction of the distal sacrum and proximal coccyx. Sclerosis and irregularity of the distal sacrum consistent with osteomyelitis.   3.  Old wound within the soft tissues posterior to the distal left SI joint with possible fistulous communication.    10/3/2022-CT of tib-fib with and without contrast, with reconstruction  IMPRESSION:     1.  Old fractures of the left tibia and fibula with extensive callus formation and bony bridging as well as extensive dystrophic calcifications. Similar to previous.   2.  Distal medial soft tissue wounds with ill-defined superficial in the soft tissue thickening and fluid collections suggestive of phlegmon. No organized abscess identified.   3.  No definite osteomyelitis.   4.  Arthritic changes.        VASCULAR STUDIES: No results found.    LAST  WOUND CULTURE:   Lab Results   Component Value Date/Time    CULTRSULT  01/23/2023 11:10 AM     Mixed enteric ade ,000 cfu/mL  Greater than 3 organisms isolated, culture of doubtful  significance, please recollect.            ASSESSMENT AND PLAN:     1. Sacral decubitus ulcer, stage IV ([facility])  Comments: Ulcer first noted in early April 2022 as small open area which quickly enlarged.  This ulcer is present distal from previous sacral ulcer which healed after surgery in January.  Patient has history of flap reconstruction x2 to this area.  CT shows progressive destruction of distal sacrum and proximal coccyx.    2/3/2023: Area has decreased slightly since last assessment, less exposure of bone, no evidence of infection.  - Excisional debridement was performed in clinic, medically necessary to promote wound healing.  -Patient is to return to clinic weekly for assessment and debridement   -Home health to see patient 1-2 times per week in between clinic visits  - Patient does not currently have follow up with Dr. Saini. If wound fails to improve can consider re-establishing with Dr. Saini for  evaluation for coverage options.  -Patient to continue offloading sacral area as much as possible    Wound care: Hydrofiber silver strip, hydrofiber silver, Mepilex    2. Postoperative wound dehiscence, subsequent encounter  Comments: On 4/26 patient underwent irrigation and debridement of multiple compartments of the left lower extremity states for pressure injury, with bone excision, and complex closure of chronic wound using biologic skin substitute.  During postop visit in surgeons office on 5/11, surgical site was noted to be dehisced.  Patient was referred to wound clinic for management.    2/3/2023: Area has decreased slightly.  Tissue quality remains poor, friable and boggy.  He has an appointment with Dr. Raman next week  -No debridement or cautery of tissue today.  -Follow-up with surgeon, Dr. Raman  -Patient to return to clinic weekly for assessment and debridement as needed  -Home health to change dressing 1-2 times per week in between clinic visits   -I suspect pressure may be an issue still.  Patient states he is wearing Prevalon boot at all times, and he does present with boot on today.    Wound care: Hydrofera Blue, Mepilex    3. Deep tissue injury  4. Pressure injury of left leg, unstageable (HCC)  Comments: DTI to posterior left lower leg first observed in clinic 7/29/2022.  Patient does not know how it started, had been wearing heel float boot consistently.    2/3/2023: Remains closed  -Continue to monitor for any deterioration in tissue quality  -Patient is currently wearing Prevalon boots to help prevent pressure injuries to bilateral lower extremities    Wound care: Mepilex, Tubigrip D    5. Pressure injury of left heel, stage 3 (HCC)  Comments: First noted in clinic on 10/21/2022, originally noted to be stage II    2/3/2023: Remains resolved  - No debridement required  - Patient reports wearing Prevalon boots 24 hours a day. He is clear having pressure to heel and recommend offloading  heels off mattress.  -Monitor each clinic visit.  High risk for recurrence     Wound Care: Heel Mepilex to reduce pressure and friction    6. Quadriplegia, C5-C7, complete ([facility])  Comments: Complicating factor.  Impaired mobility and sensation  -Patient is still spending 7-8 hours/day up in his wheelchair, though he does have appropriate offloading cushion, and knows to reposition frequently.  Wears heel float boots bilaterally at all times      Please note that this note may have been created using voice recognition software. I have worked with technical experts from [employer] to optimize the interface.  I have made every reasonable attempt to correct obvious errors, but there may be errors of grammar and possibly content that I did not discover before finalizing the note.

## 2023-02-10 ENCOUNTER — OFFICE VISIT (OUTPATIENT)
Dept: WOUND CARE | Facility: MEDICAL CENTER | Age: 73
End: 2023-02-10
Attending: INTERNAL MEDICINE
Payer: MEDICARE

## 2023-02-10 VITALS
HEART RATE: 59 BPM | SYSTOLIC BLOOD PRESSURE: 113 MMHG | OXYGEN SATURATION: 97 % | DIASTOLIC BLOOD PRESSURE: 69 MMHG | TEMPERATURE: 99.2 F | RESPIRATION RATE: 18 BRPM

## 2023-02-10 DIAGNOSIS — T14.8XXA DEEP TISSUE INJURY: ICD-10-CM

## 2023-02-10 DIAGNOSIS — L89.623 PRESSURE INJURY OF LEFT HEEL, STAGE 3 (HCC): ICD-10-CM

## 2023-02-10 DIAGNOSIS — G82.53 QUADRIPLEGIA, C5-C7, COMPLETE (HCC): ICD-10-CM

## 2023-02-10 DIAGNOSIS — L89.890 PRESSURE INJURY OF LEFT LEG, UNSTAGEABLE (HCC): ICD-10-CM

## 2023-02-10 DIAGNOSIS — L89.154 SACRAL DECUBITUS ULCER, STAGE IV (HCC): ICD-10-CM

## 2023-02-10 DIAGNOSIS — T81.31XD POSTOPERATIVE WOUND DEHISCENCE, SUBSEQUENT ENCOUNTER: ICD-10-CM

## 2023-02-10 PROCEDURE — 17250 CHEM CAUT OF GRANLTJ TISSUE: CPT | Mod: 59 | Performed by: STUDENT IN AN ORGANIZED HEALTH CARE EDUCATION/TRAINING PROGRAM

## 2023-02-10 PROCEDURE — 11043 DBRDMT MUSC&/FSCA 1ST 20/<: CPT

## 2023-02-10 PROCEDURE — 11043 DBRDMT MUSC&/FSCA 1ST 20/<: CPT | Performed by: STUDENT IN AN ORGANIZED HEALTH CARE EDUCATION/TRAINING PROGRAM

## 2023-02-10 NOTE — PATIENT INSTRUCTIONS
Reviewed POC, importance of keeping the secondary dressing dry and intact, nutrition for wound healing, offloading, s/s of complications/infection, when to notify MD/go to ER.  Pt verbalized understanding to all.

## 2023-02-11 NOTE — PROGRESS NOTES
Provider Encounter- Pressure Injury        HISTORY OF PRESENT ILLNESS  Wound History:    START OF CARE IN CLINIC: 5/3/2022    REFERRING PROVIDER: Sahara Mendez      WOUNDS- Pressure Injury, Sacrococcygeal, stage IV                                                             Surgical wound dehiscence, left medial lower leg-status post surgical I&D of stage IV pressure injury and           biologic application                    Pressure injury, DTI, left posterior lower leg-first observed in clinic 7/29/2022       Pressure injury stage II L heel - first noted 10/21     Right 2nd toe avulsion - first noted 10/21     Skin tag left posterior ear - first noted 10/21     HISTORY: Patient with history of incomplete quadriplegia referred to [facility] for treatment of a stage IV pressure injury.  He has a history of previous pressure injuries to this area, and underwent muscle flaps in 2019, and then again in 2020.  He was seen in the wound clinic in November 2021 for an ulcer proximal from his current ulcer, and pressure injuries to his left posterior lower leg and left heel.  At that time, it was discovered that the patient had retained VAC foam embedded in the wound bed of the sacral wound.  Attempts were made to get him back to his plastic surgeon, though unsuccessful.  In January he underwent surgical removal of VAC sponge along with excisional debridement of his sacral wound by Dr. Chaves.  After the surgery, his wound went on to heal without incident.   In early April 2022, his home health nurse noted a new sacral ulcer, below the previous ulcer which quickly tripled in size over the following weeks.  The ulcer to his left medial lower leg had also deteriorated, with bone visible at the base..  He was hospitalized from 4/22 until 4/27/2022 and underwent surgery with Dr. Raman on 4/26 for irrigation and debridement of multiple compartments of the left lower extremity, bone excision, and complex closure of chronic wound  using biologic skin substitute.   His sacrococcygeal wound was not surgically addressed during this admission.  He was discharged back to his group home, with home health, and referral to outpatient wound clinic for his sacral wound.  He was instructed to follow-up with his surgeon for his lower leg wound.       Postoperatively, the left medial lower leg incision dehisced.  He was seen by his surgeon at [facility] on 5/11.  The surgeon opted to leave remaining sutures in place, and refer him to the wound clinic for treatment of this wound.   Treatment of this wound was initiated in clinic on 5/12.  During this visit was also noted that his heel DTI had resolved, but that he had a new pressure injury to his left posterior lower extremity.     A new pressure injury was noted to patient's right upper buttock/lower back on 5/20/2022.  Wound was linear in shape, skin discolored but intact.     Abrasion noted to left anterior lower leg.  First observed in clinic on 7/22/2022.  Patient states he bumped his leg into his food tray.     Small DTI noted to patient's left lateral lower leg on 7/29/2022.  Skin intact but discolored.     Large area of deep tissue injury noted to patient's left exterior lower leg.  Patient denied any trauma to this area.  Skin intact.  Wound documented.    1/27/2023: [place] [facility] [place] with gross hematuria. He underwent RICHARD which showed watchman device was in place and he was taken off of Xarelto. While hospitalized wound team was consulted. He was referred back to [facility] and home health upon discharge.    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   Never used smokeless tobacco    Patient's problem list, allergies, and current medications reviewed and updated in Epic    Interval History:  Interval History thinned 7/29/2022.  Please see previous notes for complete interval history.     Interval History thinned 1/27/2023. Please see previous note for complete interval history.    1/27/2023: Clinic visit with Steve Hodges MD. Patient returns to [facility] following admission for hematuria, he ha RICHARD and watchman was in an acceptable position and cardiology recommended he stop Xeralto. Patient reports no further bleeding or hematuria since discontinued. Following cauterization of left lower leg hypergranulation tissue last clinic appears improved. His sacrum appears stable. Has appointment with Dr. Raman on 2/6 and does not have appointment with Dr. Saini.    2/3/2023 : Clinic visit with ADILSON Arthur, FNP-BC, CWOCN, CFCN.  Turk states he is feeling very well.  Bladder issues have resolved, no further bleeding.  He has an appointment to see Dr. Raman next week for his left lower leg wound.  Home health had reported to us yesterday that patient's blood pressure was quite low.  His BP is normal today.    2/10/2023: Clinic visit with Steve Hodges MD. Patient reports feeling in normal state of health denies any complaints. He was seen by Dr. Raman, no plans for surgical intervention. Patients left leg wound unchanged, sacral wound slightly larger.       REVIEW OF SYSTEMS:   Unchanged from previous clinic visit on 2/3/2023, except as documented in interval history above      PHYSICAL EXAMINATION:   /69   Pulse (!) 59   Temp 37.3 °C (99.2 °F) (Temporal)   Resp 18   SpO2 97%   Physical Exam  Constitutional:       Appearance: He is obese.   Cardiovascular:      Rate and Rhythm: Bradycardia present.   Pulmonary:      Effort: Pulmonary effort is normal. No respiratory distress.      Breath sounds: No wheezing.   Skin:     Comments: Stage IV coccygeal  pressure injury - Wound measures slightly larger, slightly less granulation tissue. No odor or purulence.    Full-thickness wound to left medial lower leg - Tissue quality remains poor and boggy and probes to bone.  Moderate serosanguineous drainage, no odor, no periwound erythema or induration.      Stage III pressure injury left posterior heel - Appears resolved with thin fragile epithelium  Left posterior/lateral lower leg wounds- small opening    Refer to wound flowsheet and photos   Neurological:      Mental Status: He is alert and oriented to person, place, and time.       WOUND ASSESSMENT  Wound 04/22/22 Pressure Injury Sacrum POA stage 4 (Active)   Wound Image    02/10/23 1430   Site Assessment Red;Yellow;Other (Comment) 02/10/23 1430   Periwound Assessment Scar tissue;Fragile 02/10/23 1430   Margins Unattached edges 02/10/23 1430   Drainage Amount Large 02/10/23 1430   Drainage Description Serosanguineous 02/10/23 1430   Treatments Cleansed;Provider debridement 02/10/23 1430   Wound Cleansing Normal Saline Irrigation 02/10/23 1430   Periwound Protectant Barrier Paste 02/10/23 1430   Dressing Cleansing/Solutions Not Applicable 02/10/23 1430   Dressing Options Hydrofiber Silver;Mepilex 02/10/23 1430   Dressing Changed Changed 02/10/23 1430   Dressing Change/Treatment Frequency Monday, Wednesday, Friday, and As Needed 02/10/23 1430   WOUND NURSE ONLY - Pressure Injury Stage 4 02/10/23 1430   Wound Length (cm) 1.5 cm 02/10/23 1430   Wound Width (cm) 1.8 cm 02/10/23 1430   Wound Depth (cm) 1.5 cm 02/10/23 1430   Wound Surface Area (cm^2) 2.7 cm^2 02/10/23 1430   Wound Volume (cm^3) 4.05 cm^3 02/10/23 1430   Post-Procedure Length (cm) 1.5 cm 02/10/23 1430   Post-Procedure Width (cm) 1.8 cm 02/10/23 1430   Post-Procedure Depth (cm) 1.5 cm 02/10/23 1430   Post-Procedure Surface Area (cm^2) 2.7 cm^2 02/10/23 1430   Post-Procedure Volume (cm^3) 4.05 cm^3 02/10/23 1430   Wound Healing % -69 02/10/23 1430   Tunneling  (cm) 0 cm 02/10/23 1430   Tunneling Clock Position of Wound 9 11/04/22 1300   Undermining (cm) 1.8 cm 02/10/23 1430   Undermining of Wound, 1st Location From 12 o'clock;To 2 o'clock 02/10/23 1430   Wound Odor None 02/10/23 1430   Exposed Structures Fascia 02/10/23 1430   Number of days: 294       Wound 11/11/22 LLE Medial (anterior and posterior merged) (Active)   Wound Image    02/10/23 1430   Site Assessment Red;Boggy;Fragile 02/10/23 1430   Periwound Assessment Fragile;Scar tissue;Red 02/10/23 1430   Margins Attached edges 02/10/23 1430   Drainage Amount Moderate 02/10/23 1430   Drainage Description Serosanguineous 02/10/23 1430   Treatments Cleansed;Site care 02/10/23 1430   Wound Cleansing Pur[place] 02/10/23 1430   Periwound Protectant Skin Moisturizer;Barrier Paste 02/10/23 1430   Dressing Cleansing/Solutions Normal Saline 02/10/23 1430   Dressing Options Hydrofera Blue Ready;Hypafix Tape 02/10/23 1430   Dressing Changed Changed 02/10/23 1430   Dressing Change/Treatment Frequency Monday, Wednesday, Friday, and As Needed 02/10/23 1430   Non-staged Wound Description Full thickness 02/10/23 1430   Wound Length (cm) 3.3 cm 02/10/23 1430   Wound Width (cm) 3.5 cm 02/10/23 1430   Wound Depth (cm) 0.2 cm 02/10/23 1430   Wound Surface Area (cm^2) 11.55 cm^2 02/10/23 1430   Wound Volume (cm^3) 2.31 cm^3 02/10/23 1430   Post-Procedure Length (cm) 0.2 cm 01/20/23 1500   Post-Procedure Width (cm) 3.5 cm 01/13/23 1400   Post-Procedure Depth (cm) 1 cm 12/30/22 1400   Post-Procedure Surface Area (cm^2) 8.75 cm^2 01/13/23 1400   Post-Procedure Volume (cm^3) 8.75 cm^3 12/30/22 1400   Wound Healing % -244 02/10/23 1430   Tunneling (cm) 0 cm 02/10/23 1430   Tunneling Clock Position of Wound 9 11/11/22 1300   Undermining (cm) 0 cm 02/10/23 1430   Wound Odor None 02/10/23 1430   Exposed Structures None 02/10/23 1430   Number of days: 91       PROCEDURE:  Debridement of sacral wound  -Lidocaine declined, he is  insensate  -Curette used to debride wound bed.  Excisional debridement was performed to remove devitalized tissue until healthy, bleeding tissue was visualized.  Total area debrided approximate 8.75 cm², including into undermined area.  Tissue debrided into the muscle / fascia layer  -Bleeding controlled with manual pressure.    -Wound care completed by wound RN, refer to flowsheet  -Patient tolerated the procedure well, without c/o pain or discomfort.    PROCEDURE: Repeated chemical cautery of hypergranulation tissue left lower extremity wound  - Friable boggy hypergranular tissue noted  - Used silver nitrate to chemically cauterize tissue  - No significant bleeding noted    BIOLOGIC LOG  5/20/2022-first application.  PRODUCT: EpiCord 2 x 3 cm . PRODUCT #[id number]; EXPIRES: 2026-12-01 5/27/2022- second application.  PRODUCT: EpiCordEX 2 x 3 cm . PRODUCT #[id number]; EXPIRES: 2026-09-01    6/3/2022- third application.  PRODUCT: EpiCordEX 2 x 3 cm . PRODUCT #EX [id number]; EXPIRES: 2026-10-01    6/10/2022- fourth application.  PRODUCT: EpiCordEX 2 x 3 cm . PRODUCT #EX [id number]; EXPIRES: 2026-09-01 6/17/2022- fifth application.  PRODUCT: EpiCordEX 2 x 3 cm . PRODUCT #EX [id number]; EXPIRES: 2027-02-01 6/24/2022- sixth application.  PRODUCT: EpiCordEX 2 x 3 cm . PRODUCT #EX [id number]; EXPIRES: 2027-02-01 07/01/22: Seventh application.  Product: Epicort Dx 2.0 x 3.0 cm reorder number [id number]; product number [id number]; expires 2027-02-01 7/22/2022- eighth application.  PRODUCT: EpiCord 2 x 3 cm, reorder #[id number]. PRODUCT #[id number]; EXPIRES: 2027-02-01      Pertinent Labs and Diagnostics:    Labs:     A1c: No results found for: HBA1C     [facility] results, 7/1/2022 (under media tab)    CRP 13    ESR 31      IMAGING:     10/3/2022-CT of pelvis with contrast  IMPRESSION:     1.  Posterior soft tissue sacral wound and 1.5 x 3.7 cm abscess.   2.  Progressive  destruction of the distal sacrum and proximal coccyx. Sclerosis and irregularity of the distal sacrum consistent with osteomyelitis.   3.  Old wound within the soft tissues posterior to the distal left SI joint with possible fistulous communication.    10/3/2022-CT of tib-fib with and without contrast, with reconstruction  IMPRESSION:     1.  Old fractures of the left tibia and fibula with extensive callus formation and bony bridging as well as extensive dystrophic calcifications. Similar to previous.   2.  Distal medial soft tissue wounds with ill-defined superficial in the soft tissue thickening and fluid collections suggestive of phlegmon. No organized abscess identified.   3.  No definite osteomyelitis.   4.  Arthritic changes.        VASCULAR STUDIES: No results found.    LAST  WOUND CULTURE:   Lab Results   Component Value Date/Time    CULTRSULT  01/23/2023 11:10 AM     Mixed enteric ade ,000 cfu/mL  Greater than 3 organisms isolated, culture of doubtful  significance, please recollect.            ASSESSMENT AND PLAN:     1. Sacral decubitus ulcer, stage IV ([facility])  Comments: Ulcer first noted in early April 2022 as small open area which quickly enlarged.  This ulcer is present distal from previous sacral ulcer which healed after surgery in January.  Patient has history of flap reconstruction x2 to this area.  CT shows progressive destruction of distal sacrum and proximal coccyx.    2/10/2023: Unfortunately area measuring larger with slightly less granulation tissue at base.  - Excisional debridement was performed in clinic, medically necessary to promote wound healing.  - He denies spending any more time in chair and denies any changes to offloading.  -Patient is to return to clinic weekly for assessment and debridement   -Home health to see patient 1-2 times per week in between clinic visits  - Patient does not currently have follow up with Dr. Saini. If wound fails to improve can consider  re-establishing with Dr. Saini for evaluation for coverage options.  -Patient to continue offloading sacral area as much as possible    Wound care: Hydrofiber silver strip, hydrofiber silver, Mepilex    2. Postoperative wound dehiscence, subsequent encounter  Comments: On 4/26 patient underwent irrigation and debridement of multiple compartments of the left lower extremity states for pressure injury, with bone excision, and complex closure of chronic wound using biologic skin substitute.  During postop visit in surgeons office on 5/11, surgical site was noted to be dehisced.  Patient was referred to wound clinic for management.    2/10/2023: Seems stable.  Tissue quality remains poor, friable and boggy and probes to bone  - Chemical cautery of hypergranulation tissue in clinic today.  - Patient had followup with Dr. Raman, no plans on surgical intervention  -Patient to return to clinic weekly for assessment and debridement as needed  -Home health to change dressing 1-2 times per week in between clinic visits   -I suspect pressure may be an issue still.  Patient states he is wearing Prevalon boot at all times, and he does present with boot on today.    Wound care: Hydrofera Blue, Mepilex    3. Deep tissue injury  4. Pressure injury of left leg, unstageable (HCC)  Comments: DTI to posterior left lower leg first observed in clinic 7/29/2022.  Patient does not know how it started, had been wearing heel float boot consistently.    2/10/2023: Remains closed  -Continue to monitor for any deterioration in tissue quality  -Patient is currently wearing Prevalon boots to help prevent pressure injuries to bilateral lower extremities    Wound care: Mepilex, Tubigrip D    5. Pressure injury of left heel, stage 3 (HCC)  Comments: First noted in clinic on 10/21/2022, originally noted to be stage II    2/10/2023: Remains resolved  - No debridement required  - Patient reports wearing Prevalon boots 24 hours a day. He is clear  having pressure to heel and recommend offloading heels off mattress.  -Monitor each clinic visit.  High risk for recurrence     Wound Care: Heel Mepilex to reduce pressure and friction    6. Quadriplegia, C5-C7, complete (HCC)  Comments: Complicating factor.  Impaired mobility and sensation  -Patient is still spending 7-8 hours/day up in his wheelchair, though he does have appropriate offloading cushion, and knows to reposition frequently.  Wears heel float boots bilaterally at all times      Please note that this note may have been created using voice recognition software. I have worked with technical experts from [employer] to optimize the interface.  I have made every reasonable attempt to correct obvious errors, but there may be errors of grammar and possibly content that I did not discover before finalizing the note.

## 2023-02-14 ENCOUNTER — NON-PROVIDER VISIT (OUTPATIENT)
Dept: CARDIOLOGY | Facility: MEDICAL CENTER | Age: 73
End: 2023-02-14
Payer: MEDICARE

## 2023-02-14 PROCEDURE — 93298 REM INTERROG DEV EVAL SCRMS: CPT | Performed by: INTERNAL MEDICINE

## 2023-02-14 NOTE — CARDIAC REMOTE MONITOR - SCAN
Device transmission reviewed. Device demonstrated appropriate function.       Electronically Signed by: Elías Merino M.D.    2/14/2023  1:52 PM

## 2023-02-16 PROBLEM — R31.0 CLOT HEMATURIA: Status: RESOLVED | Noted: 2023-01-23 | Resolved: 2023-02-16

## 2023-02-17 ENCOUNTER — OFFICE VISIT (OUTPATIENT)
Dept: WOUND CARE | Facility: MEDICAL CENTER | Age: 73
End: 2023-02-17
Attending: INTERNAL MEDICINE
Payer: MEDICARE

## 2023-02-17 ENCOUNTER — HOSPITAL ENCOUNTER (OUTPATIENT)
Facility: MEDICAL CENTER | Age: 73
End: 2023-02-17
Attending: STUDENT IN AN ORGANIZED HEALTH CARE EDUCATION/TRAINING PROGRAM
Payer: MEDICARE

## 2023-02-17 VITALS
HEART RATE: 81 BPM | SYSTOLIC BLOOD PRESSURE: 111 MMHG | TEMPERATURE: 97.6 F | OXYGEN SATURATION: 93 % | DIASTOLIC BLOOD PRESSURE: 68 MMHG | RESPIRATION RATE: 16 BRPM

## 2023-02-17 DIAGNOSIS — L89.890 PRESSURE INJURY OF LEFT LEG, UNSTAGEABLE (HCC): ICD-10-CM

## 2023-02-17 DIAGNOSIS — L89.623 PRESSURE INJURY OF LEFT HEEL, STAGE 3 (HCC): ICD-10-CM

## 2023-02-17 DIAGNOSIS — L89.154 SACRAL DECUBITUS ULCER, STAGE IV (HCC): ICD-10-CM

## 2023-02-17 DIAGNOSIS — T81.31XD POSTOPERATIVE WOUND DEHISCENCE, SUBSEQUENT ENCOUNTER: ICD-10-CM

## 2023-02-17 DIAGNOSIS — T14.8XXA DEEP TISSUE INJURY: ICD-10-CM

## 2023-02-17 DIAGNOSIS — G82.53 QUADRIPLEGIA, C5-C7, COMPLETE (HCC): ICD-10-CM

## 2023-02-17 PROCEDURE — 11044 DBRDMT BONE 1ST 20 SQ CM/<: CPT

## 2023-02-17 PROCEDURE — 88304 TISSUE EXAM BY PATHOLOGIST: CPT

## 2023-02-17 PROCEDURE — 11044 DBRDMT BONE 1ST 20 SQ CM/<: CPT | Performed by: STUDENT IN AN ORGANIZED HEALTH CARE EDUCATION/TRAINING PROGRAM

## 2023-02-17 PROCEDURE — 88311 DECALCIFY TISSUE: CPT

## 2023-02-17 NOTE — PROGRESS NOTES
Provider Encounter- Pressure Injury        HISTORY OF PRESENT ILLNESS  Wound History:    START OF CARE IN CLINIC: 5/3/2022    REFERRING PROVIDER: Sahara Mendez      WOUNDS- Pressure Injury, Sacrococcygeal, stage IV                                                             Surgical wound dehiscence, left medial lower leg-status post surgical I&D of stage IV pressure injury and           biologic application                    Pressure injury, DTI, left posterior lower leg-first observed in clinic 7/29/2022       Pressure injury stage II L heel - first noted 10/21     Right 2nd toe avulsion - first noted 10/21     Skin tag left posterior ear - first noted 10/21     HISTORY: Patient with history of incomplete quadriplegia referred to [facility] for treatment of a stage IV pressure injury.  He has a history of previous pressure injuries to this area, and underwent muscle flaps in 2019, and then again in 2020.  He was seen in the wound clinic in November 2021 for an ulcer proximal from his current ulcer, and pressure injuries to his left posterior lower leg and left heel.  At that time, it was discovered that the patient had retained VAC foam embedded in the wound bed of the sacral wound.  Attempts were made to get him back to his plastic surgeon, though unsuccessful.  In January he underwent surgical removal of VAC sponge along with excisional debridement of his sacral wound by Dr. Chaves.  After the surgery, his wound went on to heal without incident.   In early April 2022, his home health nurse noted a new sacral ulcer, below the previous ulcer which quickly tripled in size over the following weeks.  The ulcer to his left medial lower leg had also deteriorated, with bone visible at the base..  He was hospitalized from 4/22 until 4/27/2022 and underwent surgery with Dr. Raman on 4/26 for irrigation and debridement of multiple compartments of the left lower extremity, bone excision, and complex closure of chronic wound  using biologic skin substitute.   His sacrococcygeal wound was not surgically addressed during this admission.  He was discharged back to his group home, with home health, and referral to outpatient wound clinic for his sacral wound.  He was instructed to follow-up with his surgeon for his lower leg wound.       Postoperatively, the left medial lower leg incision dehisced.  He was seen by his surgeon at [facility] on 5/11.  The surgeon opted to leave remaining sutures in place, and refer him to the wound clinic for treatment of this wound.   Treatment of this wound was initiated in clinic on 5/12.  During this visit was also noted that his heel DTI had resolved, but that he had a new pressure injury to his left posterior lower extremity.     A new pressure injury was noted to patient's right upper buttock/lower back on 5/20/2022.  Wound was linear in shape, skin discolored but intact.     Abrasion noted to left anterior lower leg.  First observed in clinic on 7/22/2022.  Patient states he bumped his leg into his food tray.     Small DTI noted to patient's left lateral lower leg on 7/29/2022.  Skin intact but discolored.     Large area of deep tissue injury noted to patient's left exterior lower leg.  Patient denied any trauma to this area.  Skin intact.  Wound documented.    1/27/2023: Patient was admitted to [facility] from 1/23-1/25/2023 with gross hematuria. He underwent RICHARD which showed watchman device was in place and he was taken off of Xarelto. While hospitalized wound team was consulted. He was referred back to [facility] and home health upon discharge.    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   Never used smokeless tobacco    Patient's problem list, allergies, and current medications reviewed and updated in Epic    Interval History:  Interval History thinned 7/29/2022.  Please see previous notes for complete interval history.     Interval History thinned 1/27/2023. Please see previous note for complete interval history.    1/27/2023: Clinic visit with Steve Hodges MD. Patient returns to [facility] following admission for hematuria, he ha RICHARD and watchman was in an acceptable position and cardiology recommended he stop Xeralto. Patient reports no further bleeding or hematuria since discontinued. Following cauterization of left lower leg hypergranulation tissue last clinic appears improved. His sacrum appears stable. Has appointment with Dr. Raman on 2/6 and does not have appointment with Dr. Saini.    2/3/2023 : Clinic visit with ADILSON Arthur, FNP-BC, CWOCN, CFCN.  Turk states he is feeling very well.  Bladder issues have resolved, no further bleeding.  He has an appointment to see Dr. Raman next week for his left lower leg wound.  Home health had reported to us yesterday that patient's blood pressure was quite low.  His BP is normal today.    2/10/2023: Clinic visit with Steve Hodges MD. Patient reports feeling in normal state of health denies any complaints. He was seen by Dr. Raman, no plans for surgical intervention. Patients left leg wound unchanged, sacral wound slightly larger.    2/17/2023: Clinic visit with Steve Hodges MD. Patient reports doing ok. Denies any complaints. Left medial leg wound from surgical site dehiscence had loose bone fragment which was debrided out of wound bed in clinic, will send for culture and pathology. Sacral pressure injury measures roughly the same, increased granulation tissue over bone.       REVIEW OF SYSTEMS:   Unchanged from previous clinic visit on 2/10/2023, except as documented in interval history above      PHYSICAL EXAMINATION:   /68    Pulse 81   Temp 36.4 °C (97.6 °F) (Temporal)   Resp 16   SpO2 93%   Physical Exam  Constitutional:       Appearance: He is obese.   Cardiovascular:      Rate and Rhythm: Bradycardia present.   Pulmonary:      Effort: Pulmonary effort is normal. No respiratory distress.      Breath sounds: No wheezing.   Skin:     Comments: Stage IV coccygeal pressure injury - Wound measures the same, slightly more granulation tissue over sacrum. No odor or purulence.    Full-thickness wound to left medial lower leg - Tissue quality remains poor and boggy. Loose bone fragment in wound bed and debrided today.  Moderate serosanguineous drainage, no odor, no periwound erythema or induration.      Stage III pressure injury left posterior heel - Appears resolved with thin fragile epithelium  Left posterior/lateral lower leg wounds- Appears resolved    Refer to wound flowsheet and photos   Neurological:      Mental Status: He is alert and oriented to person, place, and time.       WOUND ASSESSMENT  Wound 04/22/22 Pressure Injury Sacrum POA stage 4 (Active)   Wound Image    02/17/23 1500   Site Assessment Red;Yellow;Other (Comment) 02/17/23 1500   Periwound Assessment Scar tissue;Fragile 02/17/23 1500   Margins Unattached edges 02/17/23 1500   Drainage Amount Large 02/17/23 1500   Drainage Description Serous;Serosanguineous 02/17/23 1500   Treatments Cleansed;Provider debridement 02/17/23 1500   Wound Cleansing Puracyn Hale 02/17/23 1500   Periwound Protectant Skin Protectant Wipes to Periwound;Barrier Paste 02/17/23 1500   Dressing Cleansing/Solutions Not Applicable 02/17/23 1500   Dressing Options Hydrofiber Silver;Mepilex;Other (Comments) 02/17/23 1500   Dressing Changed Changed 02/17/23 1500   Dressing Change/Treatment Frequency Monday, Wednesday, Friday, and As Needed 02/17/23 1500   WOUND NURSE ONLY - Pressure Injury Stage 4 02/17/23 1500   Wound Length (cm) 2.5 cm 02/17/23 1500   Wound Width (cm) 1.9 cm 02/17/23 1500   Wound  Depth (cm) 1.3 cm 02/17/23 1500   Wound Surface Area (cm^2) 4.75 cm^2 02/17/23 1500   Wound Volume (cm^3) 6.175 cm^3 02/17/23 1500   Post-Procedure Length (cm) 2.6 cm 02/17/23 1500   Post-Procedure Width (cm) 2 cm 02/17/23 1500   Post-Procedure Depth (cm) 1.3 cm 02/17/23 1500   Post-Procedure Surface Area (cm^2) 5.2 cm^2 02/17/23 1500   Post-Procedure Volume (cm^3) 6.76 cm^3 02/17/23 1500   Wound Healing % -157 02/17/23 1500   Tunneling (cm) 0 cm 02/17/23 1500   Tunneling Clock Position of Wound 9 11/04/22 1300   Undermining (cm) 1.5 cm 02/17/23 1500   Undermining of Wound, 1st Location From 8 o'clock;To 3 o'clock 02/17/23 1500   Wound Odor None 02/17/23 1500   Exposed Structures Fascia 02/17/23 1500   Number of days: 302       Wound 11/11/22 LLE Medial (anterior and posterior merged) (Active)   Wound Image   02/17/23 1500   Site Assessment Red;Boggy;Fragile 02/17/23 1500   Periwound Assessment Fragile;Scar tissue;Red 02/17/23 1500   Margins Attached edges 02/17/23 1500   Drainage Amount Moderate 02/17/23 1500   Drainage Description Serosanguineous 02/17/23 1500   Treatments Cleansed;Provider debridement 02/17/23 1500   Wound Cleansing Puracyn Vidor 02/17/23 1500   Periwound Protectant Skin Protectant Wipes to Periwound;Barrier Paste 02/17/23 1500   Dressing Cleansing/Solutions Normal Saline 02/17/23 1500   Dressing Options Hydrofera Blue Ready;Mepilex;Tubigrip;Other (Comments) 02/17/23 1500   Dressing Changed Changed 02/17/23 1500   Dressing Change/Treatment Frequency Monday, Wednesday, Friday, and As Needed 02/17/23 1500   Non-staged Wound Description Full thickness 02/17/23 1500   Wound Length (cm) 2.5 cm 02/17/23 1500   Wound Width (cm) 2.6 cm 02/17/23 1500   Wound Depth (cm) 0.6 cm 02/17/23 1500   Wound Surface Area (cm^2) 6.5 cm^2 02/17/23 1500   Wound Volume (cm^3) 3.9 cm^3 02/17/23 1500   Post-Procedure Length (cm) 2.5 cm 02/17/23 1500   Post-Procedure Width (cm) 2.7 cm 02/17/23 1500   Post-Procedure Depth  (cm) 0.6 cm 02/17/23 1500   Post-Procedure Surface Area (cm^2) 6.75 cm^2 02/17/23 1500   Post-Procedure Volume (cm^3) 4.05 cm^3 02/17/23 1500   Wound Healing % -480 02/17/23 1500   Tunneling (cm) 0 cm 02/17/23 1500   Tunneling Clock Position of Wound 9 11/11/22 1300   Undermining (cm) 0 cm 02/17/23 1500   Wound Odor None 02/17/23 1500   Exposed Structures Bone 02/17/23 1500   Number of days: 99       PROCEDURE:  Debridement of sacral wound and left medial lower extremity wound dehiscence.  -Lidocaine declined, he is insensate  -Curette used to debride wound beds, forceps used to debride bone fragment from left medial lower extremity wound.  Excisional debridement was performed to remove devitalized tissue until healthy, bleeding tissue was visualized.  Total area debrided approximate 11.95 cm², including into undermined area.  Tissue debrided at deepest point into bone.  -Bleeding controlled with manual pressure.   - Silver Nitrate applied to left medial lower extremity hypergranular tissue  -Wound care completed by wound RN, refer to flowsheet  -Patient tolerated the procedure well, without c/o pain or discomfort.    BIOLOGIC LOG  5/20/2022-first application.  PRODUCT: EpiCord 2 x 3 cm . PRODUCT #[id number]; EXPIRES: 2026-12-01 5/27/2022- second application.  PRODUCT: EpiCordEX 2 x 3 cm . PRODUCT #[id number]; EXPIRES: 2026-09-01    6/3/2022- third application.  PRODUCT: EpiCordEX 2 x 3 cm . PRODUCT #[id number]; EXPIRES: 2026-10-01    6/10/2022- fourth application.  PRODUCT: EpiCordEX 2 x 3 cm . PRODUCT #[id number]; EXPIRES: 2026-09-01 6/17/2022- fifth application.  PRODUCT: EpiCordEX 2 x 3 cm . PRODUCT #[id number]; EXPIRES: 2027-02-01 6/24/2022- sixth application.  PRODUCT: EpiCordEX 2 x 3 cm . PRODUCT #[id number]; EXPIRES: 2027--01 07/01/22: Seventh application.  Product: Epicort Dx 2.0 x 3.0 cm reorder number [id number]; product number [id number];  expires 2027-02-01 7/22/2022- eighth application.  PRODUCT: EpiCord 2 x 3 cm, reorder #[id number]. PRODUCT #[id number]; EXPIRES: 2027-02-01      Pertinent Labs and Diagnostics:    Labs:     A1c: No results found for: HBA1C     [facility] results, 7/1/2022 (under media tab)    CRP 13    ESR 31      IMAGING:     10/3/2022-CT of pelvis with contrast  IMPRESSION:     1.  Posterior soft tissue sacral wound and 1.5 x 3.7 cm abscess.   2.  Progressive destruction of the distal sacrum and proximal coccyx. Sclerosis and irregularity of the distal sacrum consistent with osteomyelitis.   3.  Old wound within the soft tissues posterior to the distal left SI joint with possible fistulous communication.    10/3/2022-CT of tib-fib with and without contrast, with reconstruction  IMPRESSION:     1.  Old fractures of the left tibia and fibula with extensive callus formation and bony bridging as well as extensive dystrophic calcifications. Similar to previous.   2.  Distal medial soft tissue wounds with ill-defined superficial in the soft tissue thickening and fluid collections suggestive of phlegmon. No organized abscess identified.   3.  No definite osteomyelitis.   4.  Arthritic changes.        VASCULAR STUDIES: No results found.    LAST  WOUND CULTURE:   Lab Results   Component Value Date/Time    CULTRSULT  01/23/2023 11:10 AM     Mixed enteric ade ,000 cfu/mL  Greater than 3 organisms isolated, culture of doubtful  significance, please recollect.            ASSESSMENT AND PLAN:     1. Sacral decubitus ulcer, stage IV ([facility])  Comments: Ulcer first noted in early April 2022 as small open area which quickly enlarged.  This ulcer is present distal from previous sacral ulcer which healed after surgery in January.  Patient has history of flap reconstruction x2 to this area.  CT shows progressive destruction of distal sacrum and proximal coccyx.    2/17/2023: Measures about the same. Improved granulation tissue from previous  assessment.  - Excisional debridement was performed in clinic, medically necessary to promote wound healing.  - He denies spending any more time in chair and denies any changes to offloading.  -Patient is to return to clinic weekly for assessment and debridement   -Home health to see patient 1-2 times per week in between clinic visits  - Patient does not currently have follow up with Dr. Saini. If wound fails to improve can consider re-establishing with Dr. Saini for evaluation for coverage options.  -Patient to continue offloading sacral area as much as possible    Wound care: Hydrofiber silver strip, hydrofiber silver, Mepilex    2. Postoperative wound dehiscence, subsequent encounter  Comments: On 4/26 patient underwent irrigation and debridement of multiple compartments of the left lower extremity states for pressure injury, with bone excision, and complex closure of chronic wound using biologic skin substitute.  During postop visit in surgeons office on 5/11, surgical site was noted to be dehisced.  Patient was referred to wound clinic for management.    2/17/2023: Bone fragment debrided from wound bed, improved hypergranulation tissue  - Excisional debridement was performed in clinic, medically necessary to promote wound healing. Chemical cautery of hypergranulation tissue in clinic today.  - Patient had followup with Dr. Raman, no plans on surgical intervention  - Bone fragment sent for pathology and culture  - May need referral to ID to discuss repeating course of Abx therapy for OM  -Patient to return to clinic weekly for assessment and debridement as needed  -Home health to change dressing 1-2 times per week in between clinic visits   -I suspect pressure may be an issue still.  Patient states he is wearing Prevalon boot at all times, and he does present with boot on today.    Wound care: Hydrofera Blue, Mepilex    3. Deep tissue injury  4. Pressure injury of left leg, unstageable ([facility])  Comments: DTI  to posterior left lower leg first observed in clinic 7/29/2022.  Patient does not know how it started, had been wearing heel float boot consistently.    2/17/2023: Remains closed  -Continue to monitor for any deterioration in tissue quality  -Patient is currently wearing Prevalon boots to help prevent pressure injuries to bilateral lower extremities    Wound care: Mepilex, Tubigrip D    5. Pressure injury of left heel, stage 3 ([facility])  Comments: First noted in clinic on 10/21/2022, originally noted to be stage II    2/17/2023: Remains resolved  - No debridement required  - Patient reports wearing Prevalon boots 24 hours a day. He is clear having pressure to heel and recommend offloading heels off mattress.  -Monitor each clinic visit.  High risk for recurrence     Wound Care: Heel Mepilex to reduce pressure and friction    6. Quadriplegia, C5-C7, complete ([facility])  Comments: Complicating factor.  Impaired mobility and sensation  -Patient is still spending 7-8 hours/day up in his wheelchair, though he does have appropriate offloading cushion, and knows to reposition frequently.  Wears heel float boots bilaterally at all times      Please note that this note may have been created using voice recognition software. I have worked with technical experts from [employer] to optimize the interface.  I have made every reasonable attempt to correct obvious errors, but there may be errors of grammar and possibly content that I did not discover before finalizing the note.

## 2023-02-17 NOTE — WOUND TEAM
Fragment taken by Provider from LLE medial wound site.  Sent to lab by RN for pathology and culture.  Sent in specimen cup and NS.    HH order entered into [facility] and routed to [facility].

## 2023-02-17 NOTE — PATIENT INSTRUCTIONS
-Keep your wound dressing clean, dry, and intact.    -Change your dressing if it becomes soiled, soaked, or falls off.    -Should you experience any significant changes in your wound(s), such as infection (redness, swelling, localized heat, increased pain, fever > 101 F, chills) or have any questions regarding your home care instructions, please contact the wound center at [phone]. If after hours, contact your primary care physician or go to the hospital emergency room.

## 2023-02-18 LAB — PATHOLOGY CONSULT NOTE: NORMAL

## 2023-02-24 ENCOUNTER — OFFICE VISIT (OUTPATIENT)
Dept: WOUND CARE | Facility: MEDICAL CENTER | Age: 73
End: 2023-02-24
Attending: INTERNAL MEDICINE
Payer: MEDICARE

## 2023-02-24 DIAGNOSIS — T81.31XD POSTOPERATIVE WOUND DEHISCENCE, SUBSEQUENT ENCOUNTER: ICD-10-CM

## 2023-02-24 DIAGNOSIS — L89.154 SACRAL DECUBITUS ULCER, STAGE IV (HCC): ICD-10-CM

## 2023-02-26 ENCOUNTER — HOSPITAL ENCOUNTER (INPATIENT)
Facility: MEDICAL CENTER | Age: 73
LOS: 4 days | DRG: 689 | End: 2023-03-02
Attending: EMERGENCY MEDICINE | Admitting: HOSPITALIST
Payer: MEDICARE

## 2023-02-26 ENCOUNTER — APPOINTMENT (OUTPATIENT)
Dept: RADIOLOGY | Facility: MEDICAL CENTER | Age: 73
DRG: 689 | End: 2023-02-26
Attending: EMERGENCY MEDICINE
Payer: MEDICARE

## 2023-02-26 DIAGNOSIS — N12 PYELONEPHRITIS: ICD-10-CM

## 2023-02-26 DIAGNOSIS — L89.300 PRESSURE INJURY OF BUTTOCK, UNSTAGEABLE, UNSPECIFIED LATERALITY (HCC): ICD-10-CM

## 2023-02-26 DIAGNOSIS — I48.0 PAROXYSMAL ATRIAL FIBRILLATION (HCC): ICD-10-CM

## 2023-02-26 PROBLEM — L89.90 DECUBITAL ULCER: Status: ACTIVE | Noted: 2023-02-26

## 2023-02-26 LAB
ALBUMIN SERPL BCP-MCNC: 3.7 G/DL (ref 3.2–4.9)
ALBUMIN/GLOB SERPL: 1.2 G/DL
ALP SERPL-CCNC: 61 U/L (ref 30–99)
ALT SERPL-CCNC: 7 U/L (ref 2–50)
ANION GAP SERPL CALC-SCNC: 10 MMOL/L (ref 7–16)
APPEARANCE UR: ABNORMAL
AST SERPL-CCNC: 11 U/L (ref 12–45)
BACTERIA #/AREA URNS HPF: ABNORMAL /HPF
BASOPHILS # BLD AUTO: 0.4 % (ref 0–1.8)
BASOPHILS # BLD: 0.03 K/UL (ref 0–0.12)
BILIRUB SERPL-MCNC: 0.8 MG/DL (ref 0.1–1.5)
BILIRUB UR QL STRIP.AUTO: NEGATIVE
BUN SERPL-MCNC: 12 MG/DL (ref 8–22)
CALCIUM ALBUM COR SERPL-MCNC: 8.9 MG/DL (ref 8.5–10.5)
CALCIUM SERPL-MCNC: 8.7 MG/DL (ref 8.4–10.2)
CHLORIDE SERPL-SCNC: 103 MMOL/L (ref 96–112)
CO2 SERPL-SCNC: 21 MMOL/L (ref 20–33)
COLOR UR: YELLOW
CREAT SERPL-MCNC: 0.63 MG/DL (ref 0.5–1.4)
EOSINOPHIL # BLD AUTO: 0.06 K/UL (ref 0–0.51)
EOSINOPHIL NFR BLD: 0.8 % (ref 0–6.9)
EPI CELLS #/AREA URNS HPF: ABNORMAL /HPF
ERYTHROCYTE [DISTWIDTH] IN BLOOD BY AUTOMATED COUNT: 49.1 FL (ref 35.9–50)
GFR SERPLBLD CREATININE-BSD FMLA CKD-EPI: 101 ML/MIN/1.73 M 2
GLOBULIN SER CALC-MCNC: 3 G/DL (ref 1.9–3.5)
GLUCOSE SERPL-MCNC: 103 MG/DL (ref 65–99)
GLUCOSE UR STRIP.AUTO-MCNC: NEGATIVE MG/DL
HCT VFR BLD AUTO: 38.7 % (ref 42–52)
HGB BLD-MCNC: 13.3 G/DL (ref 14–18)
IMM GRANULOCYTES # BLD AUTO: 0.03 K/UL (ref 0–0.11)
IMM GRANULOCYTES NFR BLD AUTO: 0.4 % (ref 0–0.9)
KETONES UR STRIP.AUTO-MCNC: ABNORMAL MG/DL
LACTATE SERPL-SCNC: 1 MMOL/L (ref 0.5–2)
LEUKOCYTE ESTERASE UR QL STRIP.AUTO: ABNORMAL
LYMPHOCYTES # BLD AUTO: 1.66 K/UL (ref 1–4.8)
LYMPHOCYTES NFR BLD: 20.9 % (ref 22–41)
MCH RBC QN AUTO: 34.5 PG (ref 27–33)
MCHC RBC AUTO-ENTMCNC: 34.4 G/DL (ref 33.7–35.3)
MCV RBC AUTO: 100.3 FL (ref 81.4–97.8)
MICRO URNS: ABNORMAL
MONOCYTES # BLD AUTO: 0.53 K/UL (ref 0–0.85)
MONOCYTES NFR BLD AUTO: 6.7 % (ref 0–13.4)
MUCOUS THREADS #/AREA URNS HPF: ABNORMAL /HPF
NEUTROPHILS # BLD AUTO: 5.64 K/UL (ref 1.82–7.42)
NEUTROPHILS NFR BLD: 70.8 % (ref 44–72)
NITRITE UR QL STRIP.AUTO: POSITIVE
NRBC # BLD AUTO: 0 K/UL
NRBC BLD-RTO: 0 /100 WBC
PH UR STRIP.AUTO: >=9 [PH] (ref 5–8)
PLATELET # BLD AUTO: 173 K/UL (ref 164–446)
PMV BLD AUTO: 8.6 FL (ref 9–12.9)
POTASSIUM SERPL-SCNC: 4.1 MMOL/L (ref 3.6–5.5)
PROT SERPL-MCNC: 6.7 G/DL (ref 6–8.2)
PROT UR QL STRIP: 30 MG/DL
RBC # BLD AUTO: 3.86 M/UL (ref 4.7–6.1)
RBC # URNS HPF: ABNORMAL /HPF
RBC UR QL AUTO: NEGATIVE
SODIUM SERPL-SCNC: 134 MMOL/L (ref 135–145)
SP GR UR STRIP.AUTO: <=1.005
UNIDENT CRYS URNS QL MICRO: ABNORMAL /HPF
WBC # BLD AUTO: 8 K/UL (ref 4.8–10.8)
WBC #/AREA URNS HPF: ABNORMAL /HPF

## 2023-02-26 PROCEDURE — 87070 CULTURE OTHR SPECIMN AEROBIC: CPT

## 2023-02-26 PROCEDURE — 99222 1ST HOSP IP/OBS MODERATE 55: CPT | Performed by: HOSPITALIST

## 2023-02-26 PROCEDURE — 700102 HCHG RX REV CODE 250 W/ 637 OVERRIDE(OP): Performed by: HOSPITALIST

## 2023-02-26 PROCEDURE — 80053 COMPREHEN METABOLIC PANEL: CPT

## 2023-02-26 PROCEDURE — 700105 HCHG RX REV CODE 258: Performed by: HOSPITALIST

## 2023-02-26 PROCEDURE — 85025 COMPLETE CBC W/AUTO DIFF WBC: CPT

## 2023-02-26 PROCEDURE — 83605 ASSAY OF LACTIC ACID: CPT

## 2023-02-26 PROCEDURE — 700111 HCHG RX REV CODE 636 W/ 250 OVERRIDE (IP): Performed by: EMERGENCY MEDICINE

## 2023-02-26 PROCEDURE — 36415 COLL VENOUS BLD VENIPUNCTURE: CPT

## 2023-02-26 PROCEDURE — 87205 SMEAR GRAM STAIN: CPT

## 2023-02-26 PROCEDURE — 71045 X-RAY EXAM CHEST 1 VIEW: CPT

## 2023-02-26 PROCEDURE — 96365 THER/PROPH/DIAG IV INF INIT: CPT

## 2023-02-26 PROCEDURE — 700111 HCHG RX REV CODE 636 W/ 250 OVERRIDE (IP): Performed by: HOSPITALIST

## 2023-02-26 PROCEDURE — A9270 NON-COVERED ITEM OR SERVICE: HCPCS | Performed by: HOSPITALIST

## 2023-02-26 PROCEDURE — 770001 HCHG ROOM/CARE - MED/SURG/GYN PRIV*

## 2023-02-26 PROCEDURE — 87077 CULTURE AEROBIC IDENTIFY: CPT | Mod: 91

## 2023-02-26 PROCEDURE — 99285 EMERGENCY DEPT VISIT HI MDM: CPT

## 2023-02-26 PROCEDURE — 87641 MR-STAPH DNA AMP PROBE: CPT

## 2023-02-26 PROCEDURE — 87040 BLOOD CULTURE FOR BACTERIA: CPT

## 2023-02-26 PROCEDURE — 87086 URINE CULTURE/COLONY COUNT: CPT

## 2023-02-26 PROCEDURE — 81001 URINALYSIS AUTO W/SCOPE: CPT

## 2023-02-26 PROCEDURE — 94760 N-INVAS EAR/PLS OXIMETRY 1: CPT

## 2023-02-26 PROCEDURE — 700105 HCHG RX REV CODE 258: Performed by: EMERGENCY MEDICINE

## 2023-02-26 RX ORDER — ASCORBIC ACID 500 MG
1000 TABLET ORAL EVERY MORNING
Status: DISCONTINUED | OUTPATIENT
Start: 2023-02-26 | End: 2023-03-02 | Stop reason: HOSPADM

## 2023-02-26 RX ORDER — LABETALOL HYDROCHLORIDE 5 MG/ML
10 INJECTION, SOLUTION INTRAVENOUS EVERY 4 HOURS PRN
Status: DISCONTINUED | OUTPATIENT
Start: 2023-02-26 | End: 2023-03-02 | Stop reason: HOSPADM

## 2023-02-26 RX ORDER — BISACODYL 10 MG
10 SUPPOSITORY, RECTAL RECTAL
Status: DISCONTINUED | OUTPATIENT
Start: 2023-02-26 | End: 2023-03-02 | Stop reason: HOSPADM

## 2023-02-26 RX ORDER — CHOLECALCIFEROL (VITAMIN D3) 125 MCG
10 CAPSULE ORAL
Status: DISCONTINUED | OUTPATIENT
Start: 2023-02-26 | End: 2023-03-02 | Stop reason: HOSPADM

## 2023-02-26 RX ORDER — LANOLIN ALCOHOL/MO/W.PET/CERES
400 CREAM (GRAM) TOPICAL EVERY MORNING
Status: DISCONTINUED | OUTPATIENT
Start: 2023-02-26 | End: 2023-03-02 | Stop reason: HOSPADM

## 2023-02-26 RX ORDER — ONDANSETRON 4 MG/1
4 TABLET, ORALLY DISINTEGRATING ORAL EVERY 4 HOURS PRN
Status: DISCONTINUED | OUTPATIENT
Start: 2023-02-26 | End: 2023-03-02 | Stop reason: HOSPADM

## 2023-02-26 RX ORDER — LINEZOLID 2 MG/ML
600 INJECTION, SOLUTION INTRAVENOUS EVERY 12 HOURS
Status: COMPLETED | OUTPATIENT
Start: 2023-02-26 | End: 2023-03-01

## 2023-02-26 RX ORDER — MORPHINE SULFATE 4 MG/ML
2 INJECTION INTRAVENOUS
Status: DISCONTINUED | OUTPATIENT
Start: 2023-02-26 | End: 2023-03-02 | Stop reason: HOSPADM

## 2023-02-26 RX ORDER — ZINC SULFATE 50(220)MG
220 CAPSULE ORAL EVERY MORNING
Status: DISCONTINUED | OUTPATIENT
Start: 2023-02-26 | End: 2023-03-02 | Stop reason: HOSPADM

## 2023-02-26 RX ORDER — POLYETHYLENE GLYCOL 3350 17 G/17G
1 POWDER, FOR SOLUTION ORAL DAILY
Status: DISCONTINUED | OUTPATIENT
Start: 2023-02-26 | End: 2023-03-02 | Stop reason: HOSPADM

## 2023-02-26 RX ORDER — ONDANSETRON 2 MG/ML
4 INJECTION INTRAMUSCULAR; INTRAVENOUS EVERY 4 HOURS PRN
Status: DISCONTINUED | OUTPATIENT
Start: 2023-02-26 | End: 2023-03-02 | Stop reason: HOSPADM

## 2023-02-26 RX ORDER — FERROUS SULFATE 325(65) MG
325 TABLET ORAL 2 TIMES DAILY
Status: DISCONTINUED | OUTPATIENT
Start: 2023-02-26 | End: 2023-03-02 | Stop reason: HOSPADM

## 2023-02-26 RX ORDER — POLYETHYLENE GLYCOL 3350 17 G/17G
1 POWDER, FOR SOLUTION ORAL
Status: DISCONTINUED | OUTPATIENT
Start: 2023-02-26 | End: 2023-03-02 | Stop reason: HOSPADM

## 2023-02-26 RX ORDER — OXYCODONE HYDROCHLORIDE 5 MG/1
5 TABLET ORAL
Status: DISCONTINUED | OUTPATIENT
Start: 2023-02-26 | End: 2023-03-02 | Stop reason: HOSPADM

## 2023-02-26 RX ORDER — VITAMIN B COMPLEX
2000 TABLET ORAL EVERY MORNING
Status: DISCONTINUED | OUTPATIENT
Start: 2023-02-26 | End: 2023-03-02 | Stop reason: HOSPADM

## 2023-02-26 RX ORDER — AMLODIPINE BESYLATE 5 MG/1
10 TABLET ORAL EVERY MORNING
Status: DISCONTINUED | OUTPATIENT
Start: 2023-02-26 | End: 2023-03-01

## 2023-02-26 RX ORDER — OXYCODONE HYDROCHLORIDE 5 MG/1
2.5 TABLET ORAL
Status: DISCONTINUED | OUTPATIENT
Start: 2023-02-26 | End: 2023-03-02 | Stop reason: HOSPADM

## 2023-02-26 RX ORDER — BACLOFEN 10 MG/1
20 TABLET ORAL 4 TIMES DAILY
Status: DISCONTINUED | OUTPATIENT
Start: 2023-02-26 | End: 2023-03-02 | Stop reason: HOSPADM

## 2023-02-26 RX ORDER — ACETAMINOPHEN 325 MG/1
650 TABLET ORAL EVERY 6 HOURS PRN
Status: DISCONTINUED | OUTPATIENT
Start: 2023-02-26 | End: 2023-03-02 | Stop reason: HOSPADM

## 2023-02-26 RX ORDER — SODIUM CHLORIDE 9 MG/ML
INJECTION, SOLUTION INTRAVENOUS CONTINUOUS
Status: ACTIVE | OUTPATIENT
Start: 2023-02-26 | End: 2023-02-26

## 2023-02-26 RX ORDER — LOSARTAN POTASSIUM 25 MG/1
50 TABLET ORAL
Status: DISCONTINUED | OUTPATIENT
Start: 2023-02-26 | End: 2023-03-02 | Stop reason: HOSPADM

## 2023-02-26 RX ORDER — AMOXICILLIN 250 MG
2 CAPSULE ORAL 2 TIMES DAILY
Status: DISCONTINUED | OUTPATIENT
Start: 2023-02-26 | End: 2023-03-02 | Stop reason: HOSPADM

## 2023-02-26 RX ADMIN — SENNOSIDES AND DOCUSATE SODIUM 2 TABLET: 50; 8.6 TABLET ORAL at 17:35

## 2023-02-26 RX ADMIN — FERROUS SULFATE TAB 325 MG (65 MG ELEMENTAL FE) 325 MG: 325 (65 FE) TAB at 17:35

## 2023-02-26 RX ADMIN — PIPERACILLIN AND TAZOBACTAM 3.38 G: 3; .375 INJECTION, POWDER, LYOPHILIZED, FOR SOLUTION INTRAVENOUS; PARENTERAL at 12:26

## 2023-02-26 RX ADMIN — MEROPENEM 500 MG: 500 INJECTION INTRAVENOUS at 17:37

## 2023-02-26 RX ADMIN — BACLOFEN 20 MG: 10 TABLET ORAL at 16:41

## 2023-02-26 RX ADMIN — Medication 10 MG: at 22:35

## 2023-02-26 RX ADMIN — SODIUM CHLORIDE: 9 INJECTION, SOLUTION INTRAVENOUS at 15:48

## 2023-02-26 RX ADMIN — BACLOFEN 20 MG: 10 TABLET ORAL at 22:35

## 2023-02-26 RX ADMIN — LOSARTAN POTASSIUM 50 MG: 25 TABLET, FILM COATED ORAL at 22:35

## 2023-02-26 RX ADMIN — LINEZOLID 600 MG: 600 INJECTION, SOLUTION INTRAVENOUS at 18:27

## 2023-02-26 ASSESSMENT — LIFESTYLE VARIABLES
EVER FELT BAD OR GUILTY ABOUT YOUR DRINKING: YES
AVERAGE NUMBER OF DAYS PER WEEK YOU HAVE A DRINK CONTAINING ALCOHOL: 7
SUBSTANCE_ABUSE: 0
TOTAL SCORE: 4
CONSUMPTION TOTAL: POSITIVE
HAVE PEOPLE ANNOYED YOU BY CRITICIZING YOUR DRINKING: YES
ON A TYPICAL DAY WHEN YOU DRINK ALCOHOL HOW MANY DRINKS DO YOU HAVE: 1
HOW MANY TIMES IN THE PAST YEAR HAVE YOU HAD 5 OR MORE DRINKS IN A DAY: 0
HAVE YOU EVER FELT YOU SHOULD CUT DOWN ON YOUR DRINKING: YES
EVER HAD A DRINK FIRST THING IN THE MORNING TO STEADY YOUR NERVES TO GET RID OF A HANGOVER: YES

## 2023-02-26 ASSESSMENT — COGNITIVE AND FUNCTIONAL STATUS - GENERAL
HELP NEEDED FOR BATHING: TOTAL
DRESSING REGULAR LOWER BODY CLOTHING: TOTAL
CLIMB 3 TO 5 STEPS WITH RAILING: TOTAL
MOVING FROM LYING ON BACK TO SITTING ON SIDE OF FLAT BED: UNABLE
SUGGESTED CMS G CODE MODIFIER DAILY ACTIVITY: CL
TOILETING: TOTAL
TURNING FROM BACK TO SIDE WHILE IN FLAT BAD: UNABLE
DAILY ACTIVITIY SCORE: 12
SUGGESTED CMS G CODE MODIFIER MOBILITY: CN
MOVING TO AND FROM BED TO CHAIR: UNABLE
MOBILITY SCORE: 6
WALKING IN HOSPITAL ROOM: TOTAL
DRESSING REGULAR UPPER BODY CLOTHING: TOTAL
STANDING UP FROM CHAIR USING ARMS: TOTAL

## 2023-02-26 ASSESSMENT — ENCOUNTER SYMPTOMS
CHILLS: 1
MEMORY LOSS: 0
NAUSEA: 0
SEIZURES: 0
DOUBLE VISION: 0
DEPRESSION: 0
CARDIOVASCULAR NEGATIVE: 1
TREMORS: 0
BLURRED VISION: 0
DIAPHORESIS: 0
PALPITATIONS: 0
RESPIRATORY NEGATIVE: 1
BACK PAIN: 0
CONSTIPATION: 0
DIARRHEA: 0
BLOOD IN STOOL: 0
NEUROLOGICAL NEGATIVE: 1
MYALGIAS: 0
NERVOUS/ANXIOUS: 0
HEADACHES: 0
FEVER: 1
EYES NEGATIVE: 1
PSYCHIATRIC NEGATIVE: 1
SENSORY CHANGE: 0
MUSCULOSKELETAL NEGATIVE: 1
DIZZINESS: 0
SHORTNESS OF BREATH: 0
BRUISES/BLEEDS EASILY: 0
SPUTUM PRODUCTION: 0
ABDOMINAL PAIN: 0
FOCAL WEAKNESS: 0
HEARTBURN: 0
LOSS OF CONSCIOUSNESS: 0
VOMITING: 0
GASTROINTESTINAL NEGATIVE: 1

## 2023-02-26 ASSESSMENT — FIBROSIS 4 INDEX: FIB4 SCORE: 1.81

## 2023-02-26 NOTE — CARE PLAN
The patient is Watcher - Medium risk of patient condition declining or worsening    Shift Goals  Clinical Goals: afebrile, wound care  Patient Goals: rest    Progress made toward(s) clinical / shift goals:  Patient able to maintain no fever. Wound care orders placed.     Patient is not progressing towards the following goals:

## 2023-02-26 NOTE — ASSESSMENT & PLAN NOTE
Continue at this point wound care management  Optimize antibiotics  Patient has no sensation and thus has no pain at the location.
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
## 2023-02-26 NOTE — ASSESSMENT & PLAN NOTE
Currently rate is controlled and stable as the patient did have a Watchman procedure done in the past
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
## 2023-02-26 NOTE — ASSESSMENT & PLAN NOTE
History of ESBL  Monitor blood and urine culture  Discussed antibiotics with infectious ease and recommended to continue Merrem for at least 5 days.  Fevers have resolved  Patient does have a chronic suprapubic catheter in place which causes a high risk of reinfection and also colonization

## 2023-02-26 NOTE — ASSESSMENT & PLAN NOTE
Body mass index is 31.85 kg/m².  Outpatient weight loss management program and lifestyle modification highly recommended.

## 2023-02-26 NOTE — ED NOTES
1020 Assessment done  1030 Blood drawn and sent  1040 2 nd bl Cx sent Urine collected from supra pubic cath and sent  1050 PO fluids provided POC reviewed Awaiting results

## 2023-02-26 NOTE — ED TRIAGE NOTES
Chief Complaint   Patient presents with    Fever      Pt BIB [facility] from group home. Pt complains  of  fever at home up to 103. Pt does have a chronic urethral catheter in place, clear, dark urine noted.

## 2023-02-26 NOTE — ASSESSMENT & PLAN NOTE
Patient has a suprapubic catheter in place and this will be continued  At risk for future infections

## 2023-02-26 NOTE — ASSESSMENT & PLAN NOTE
Continue with blood pressure management optimization currently patient is on Norvasc 5 mg and losartan 50 mg  Most recent blood pressure is 143/73
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
## 2023-02-26 NOTE — PROGRESS NOTES
Pt has hx of multiple skin wounds and quadriplegia, NAM notified of need for low Air loss mattress. All in house currently in use. NAM escalated to CE to obtain mattress.   PT currently on standard mattress, q2h turns to be in place, waffle overlay being delivered to add to mattress until Low air loss obtained. PT updated on low air loss mattress in progress.    CE delivered Low air loss mattress, available bed being converted now.

## 2023-02-26 NOTE — ASSESSMENT & PLAN NOTE
Wound care following.  We will need wound care in the outpatient setting  We will also need home health  Continue at this point with isolation given history of ESBL and Enterococcus.  Patient has no sensation in the skin and either of the gluteal or the left ankle region.  Frequent turning protocol  Dressing changes at least daily  Follow-up on culture results which have been obtained yesterday

## 2023-02-26 NOTE — PROGRESS NOTES
4 Eyes Skin Assessment Completed by ZAIRA Fam and Jamie RN.    Head WDL  Ears WDL  Nose WDL  Mouth WDL  Neck WDL  Breast/Chest WDL  Shoulder Blades WDL  Spine WDL  (R) Arm/Elbow/Hand WDL  (L) Arm/Elbow/Hand WDL  Abdomen colostomy, suprapubic catheter  Groin WDL  Scrotum/Coccyx/Buttocks Non-Blanching, ulcer  (R) Leg Redness, Scar, and Scab  (L) Leg Redness, Scar, and Scab    (R) Heel/Foot/Toe WDL  (L) Heel/Foot/Toe Ulcer(s)          Devices In Places Blood Pressure Cuff      Interventions In Place Heel Mepilex, Sacral Mepilex, Heel Float Boots, Pillows, and Pressure Redistribution Mattress    Possible Skin Injury Yes    Pictures Uploaded Into Epic Yes  Wound Consult Placed Yes  RN Wound Prevention Protocol Ordered Yes

## 2023-02-26 NOTE — ED NOTES
Med rec updated and complete, per MAR from [facility] ([phone])  Allergies reviewed, per pt  Per MAR pt may have one drink of alcohol daily, last drink was 2/25/2023

## 2023-02-26 NOTE — ASSESSMENT & PLAN NOTE
Since the patient is quadriplegic the patient has a suprapubic catheter in place  Continue monitoring output

## 2023-02-27 LAB
ANION GAP SERPL CALC-SCNC: 10 MMOL/L (ref 7–16)
BACTERIA UR CULT: NORMAL
BUN SERPL-MCNC: 13 MG/DL (ref 8–22)
CALCIUM SERPL-MCNC: 8.4 MG/DL (ref 8.4–10.2)
CHLORIDE SERPL-SCNC: 109 MMOL/L (ref 96–112)
CO2 SERPL-SCNC: 20 MMOL/L (ref 20–33)
CREAT SERPL-MCNC: 0.55 MG/DL (ref 0.5–1.4)
ERYTHROCYTE [DISTWIDTH] IN BLOOD BY AUTOMATED COUNT: 52.4 FL (ref 35.9–50)
GFR SERPLBLD CREATININE-BSD FMLA CKD-EPI: 105 ML/MIN/1.73 M 2
GLUCOSE SERPL-MCNC: 98 MG/DL (ref 65–99)
GRAM STN SPEC: NORMAL
HCT VFR BLD AUTO: 40.2 % (ref 42–52)
HGB BLD-MCNC: 13.2 G/DL (ref 14–18)
MCH RBC QN AUTO: 34.7 PG (ref 27–33)
MCHC RBC AUTO-ENTMCNC: 32.8 G/DL (ref 33.7–35.3)
MCV RBC AUTO: 105.8 FL (ref 81.4–97.8)
PLATELET # BLD AUTO: 145 K/UL (ref 164–446)
PMV BLD AUTO: 8.9 FL (ref 9–12.9)
POTASSIUM SERPL-SCNC: 3.9 MMOL/L (ref 3.6–5.5)
RBC # BLD AUTO: 3.8 M/UL (ref 4.7–6.1)
SCCMEC + MECA PNL NOSE NAA+PROBE: NEGATIVE
SIGNIFICANT IND 70042: NORMAL
SIGNIFICANT IND 70042: NORMAL
SITE SITE: NORMAL
SITE SITE: NORMAL
SODIUM SERPL-SCNC: 139 MMOL/L (ref 135–145)
SOURCE SOURCE: NORMAL
SOURCE SOURCE: NORMAL
WBC # BLD AUTO: 6.5 K/UL (ref 4.8–10.8)

## 2023-02-27 PROCEDURE — A9270 NON-COVERED ITEM OR SERVICE: HCPCS | Performed by: HOSPITALIST

## 2023-02-27 PROCEDURE — 85027 COMPLETE CBC AUTOMATED: CPT

## 2023-02-27 PROCEDURE — 97602 WOUND(S) CARE NON-SELECTIVE: CPT

## 2023-02-27 PROCEDURE — 302098 PASTE RING (FLAT): Performed by: HOSPITALIST

## 2023-02-27 PROCEDURE — 770001 HCHG ROOM/CARE - MED/SURG/GYN PRIV*

## 2023-02-27 PROCEDURE — 80048 BASIC METABOLIC PNL TOTAL CA: CPT

## 2023-02-27 PROCEDURE — 700111 HCHG RX REV CODE 636 W/ 250 OVERRIDE (IP): Performed by: HOSPITALIST

## 2023-02-27 PROCEDURE — 700102 HCHG RX REV CODE 250 W/ 637 OVERRIDE(OP): Performed by: HOSPITALIST

## 2023-02-27 PROCEDURE — 94760 N-INVAS EAR/PLS OXIMETRY 1: CPT

## 2023-02-27 PROCEDURE — 700105 HCHG RX REV CODE 258: Performed by: HOSPITALIST

## 2023-02-27 PROCEDURE — 36415 COLL VENOUS BLD VENIPUNCTURE: CPT

## 2023-02-27 PROCEDURE — 99232 SBSQ HOSP IP/OBS MODERATE 35: CPT | Performed by: HOSPITALIST

## 2023-02-27 RX ADMIN — OXYCODONE HYDROCHLORIDE AND ACETAMINOPHEN 1000 MG: 500 TABLET ORAL at 05:29

## 2023-02-27 RX ADMIN — BACLOFEN 20 MG: 10 TABLET ORAL at 10:20

## 2023-02-27 RX ADMIN — LINEZOLID 600 MG: 600 INJECTION, SOLUTION INTRAVENOUS at 06:04

## 2023-02-27 RX ADMIN — LOSARTAN POTASSIUM 50 MG: 25 TABLET, FILM COATED ORAL at 21:40

## 2023-02-27 RX ADMIN — BACLOFEN 20 MG: 10 TABLET ORAL at 20:00

## 2023-02-27 RX ADMIN — MEROPENEM 500 MG: 500 INJECTION INTRAVENOUS at 05:28

## 2023-02-27 RX ADMIN — ZINC SULFATE 220 MG (50 MG) CAPSULE 220 MG: CAPSULE at 05:29

## 2023-02-27 RX ADMIN — SENNOSIDES AND DOCUSATE SODIUM 2 TABLET: 50; 8.6 TABLET ORAL at 05:29

## 2023-02-27 RX ADMIN — LINEZOLID 600 MG: 600 INJECTION, SOLUTION INTRAVENOUS at 19:56

## 2023-02-27 RX ADMIN — MEROPENEM 500 MG: 500 INJECTION INTRAVENOUS at 17:56

## 2023-02-27 RX ADMIN — MEROPENEM 500 MG: 500 INJECTION INTRAVENOUS at 00:45

## 2023-02-27 RX ADMIN — MEROPENEM 500 MG: 500 INJECTION INTRAVENOUS at 23:59

## 2023-02-27 RX ADMIN — SENNOSIDES AND DOCUSATE SODIUM 2 TABLET: 50; 8.6 TABLET ORAL at 17:52

## 2023-02-27 RX ADMIN — FERROUS SULFATE TAB 325 MG (65 MG ELEMENTAL FE) 325 MG: 325 (65 FE) TAB at 17:52

## 2023-02-27 RX ADMIN — FERROUS SULFATE TAB 325 MG (65 MG ELEMENTAL FE) 325 MG: 325 (65 FE) TAB at 05:29

## 2023-02-27 RX ADMIN — MAGNESIUM GLUCONATE 500 MG ORAL TABLET 400 MG: 500 TABLET ORAL at 05:29

## 2023-02-27 RX ADMIN — Medication 10 MG: at 21:40

## 2023-02-27 RX ADMIN — MEROPENEM 500 MG: 500 INJECTION INTRAVENOUS at 13:03

## 2023-02-27 RX ADMIN — Medication 2000 UNITS: at 05:29

## 2023-02-27 RX ADMIN — BACLOFEN 20 MG: 10 TABLET ORAL at 13:01

## 2023-02-27 RX ADMIN — BACLOFEN 20 MG: 10 TABLET ORAL at 17:52

## 2023-02-27 RX ADMIN — ASPIRIN 81 MG: 81 TABLET, COATED ORAL at 05:29

## 2023-02-27 RX ADMIN — ACETAMINOPHEN 650 MG: 325 TABLET, FILM COATED ORAL at 06:06

## 2023-02-27 ASSESSMENT — ENCOUNTER SYMPTOMS
CHILLS: 1
NERVOUS/ANXIOUS: 0
BRUISES/BLEEDS EASILY: 0
LOSS OF CONSCIOUSNESS: 0
SENSORY CHANGE: 0
DIAPHORESIS: 0
CONSTIPATION: 0
TREMORS: 0
HEMOPTYSIS: 0
BLOOD IN STOOL: 0
DIARRHEA: 0
BLURRED VISION: 0
EYES NEGATIVE: 1
RESPIRATORY NEGATIVE: 1
CARDIOVASCULAR NEGATIVE: 1
FOCAL WEAKNESS: 0
DOUBLE VISION: 0
HEARTBURN: 0
NEUROLOGICAL NEGATIVE: 1
FEVER: 1
VOMITING: 0
HEADACHES: 0
SPUTUM PRODUCTION: 0
MUSCULOSKELETAL NEGATIVE: 1
MYALGIAS: 0
MEMORY LOSS: 0
PALPITATIONS: 0
BACK PAIN: 0
NAUSEA: 0
PSYCHIATRIC NEGATIVE: 1
SEIZURES: 0
GASTROINTESTINAL NEGATIVE: 1
SHORTNESS OF BREATH: 0
DEPRESSION: 0
DIZZINESS: 0

## 2023-02-27 ASSESSMENT — PATIENT HEALTH QUESTIONNAIRE - PHQ9
2. FEELING DOWN, DEPRESSED, IRRITABLE, OR HOPELESS: NOT AT ALL
SUM OF ALL RESPONSES TO PHQ9 QUESTIONS 1 AND 2: 0
1. LITTLE INTEREST OR PLEASURE IN DOING THINGS: NOT AT ALL
1. LITTLE INTEREST OR PLEASURE IN DOING THINGS: NOT AT ALL
SUM OF ALL RESPONSES TO PHQ9 QUESTIONS 1 AND 2: 0
2. FEELING DOWN, DEPRESSED, IRRITABLE, OR HOPELESS: NOT AT ALL

## 2023-02-27 ASSESSMENT — PAIN DESCRIPTION - PAIN TYPE: TYPE: ACUTE PAIN

## 2023-02-27 ASSESSMENT — LIFESTYLE VARIABLES: SUBSTANCE_ABUSE: 0

## 2023-02-27 NOTE — DISCHARGE PLANNING
Case Management Discharge Planning    Admission Date: 2/26/2023  GMLOS: 3.8  ALOS: 1    6-Clicks ADL Score: 12  6-Clicks Mobility Score: 6  PT and/or OT Eval ordered: Yes  Post-acute Referrals Ordered: NA  Post-acute Choice Obtained: NA  Has referral(s) been sent to post-acute provider:  [facility]      Anticipated Discharge Dispo: Discharge Disposition: D/T to home under HHA care in anticipation of covered skilled care (06)    DME Needed: No    Action(s) Taken: Updated Provider/Nurse on Discharge Plan    0855: RN CM spoke with staff at [facility] ([phone]) who states patient is cleared to go back once medically cleared. ZAIRA JARVIS spoke with Za at [facility] ([phone]) who states patient is on service with them and has requested we send new HH referral to fax number [phone].  RN CM to request HH order from MD.     1120: Patient states he feels comfortable going back to [facility] and also with [facility]. RN CM requested HH order from MD.     Escalations Completed: Provider    Medically Clear: No    Next Steps: HH order    Barriers to Discharge: Medical clearance and HH acceptance

## 2023-02-27 NOTE — CARE PLAN
The patient is Stable - Low risk of patient condition declining or worsening    Shift Goals  Clinical Goals: wound care  Patient Goals: rest and comfort    Progress made toward(s) clinical / shift goals: Orders made per protocol, low air bed, dressings placed, pt educated      Problem: Knowledge Deficit - Standard  Goal: Patient and family/care givers will demonstrate understanding of plan of care, disease process/condition, diagnostic tests and medications  2/27/2023 0558 by Gabby Gagnon R.N.  Outcome: Progressing  2/27/2023 0556 by Gabby Gagnon R.N.  Outcome: Progressing       Patient is not progressing towards the following goals:

## 2023-02-27 NOTE — DISCHARGE PLANNING
ER CM met with pt at bedside. AO x4. Pleasant. Lives at [facility]. He is a para from nipple line down. He has Home health Mon Wed with [facility]. He has extensive Decubs. No wound vac recently for over a month per his report just dressings. He has a colostomy he does a lot of that care.   Rx [facility] specialty KATE Pretty  PCP .   Care Transition Team Assessment    Information Source  Orientation Level: Oriented X4  Information Given By: Patient  Informant's Name: Anjum  Who is responsible for making decisions for patient? : Patient         Elopement Risk  Legal Hold: No  Ambulatory or Self Mobile in Wheelchair: No-Not an Elopement Risk    Interdisciplinary Discharge Planning  Does Admitting Nurse Feel This Could be a Complex Discharge?: Yes  Primary Care Physician: Britni Arce  Lives with - Patient's Self Care Capacity: Other (Comments) ([facility])  Patient or legal guardian wants to designate a caregiver: No  Support Systems: Family Member(s)  Housing / Facility: [facility]  Name of Care Facility: [facility]  Do You Take your Prescribed Medications Regularly: Yes  Able to Return to Previous ADL's: Yes  Prior Services: Skilled Home Health Services ([facility] Mon Wed)  Assistance Needed: Yes  Durable Medical Equipment: Not Applicable    Discharge Preparedness  What is your plan after discharge?: [facility]  What are your discharge supports?: Other (comment)  Prior Functional Level: Other (Comments), Needs Assist with Medication Management, Needs Assist with Activities of Daily Living    Functional Assesment  Prior Functional Level: Other (Comments), Needs Assist with Medication Management, Needs Assist with Activities of Daily Living    Finances  Prescription Coverage: Yes ([facility] specialty)    Vision / Hearing Impairment  Vision Impairment : Yes  Right Eye Vision: Impaired, Wears Glasses  Left Eye Vision: Impaired, Wears Glasses  Hearing Impairment : No              Domestic  Abuse  Have you ever been the victim of abuse or violence?: No  Physical Abuse or Sexual Abuse: No  Verbal Abuse or Emotional Abuse: No  Possible Abuse/Neglect Reported to:: Not Applicable              Anticipated Discharge Information  Discharge Disposition: D/T to home under HHA care in anticipation of covered skilled care (06)

## 2023-02-27 NOTE — PROGRESS NOTES
Report received from NOC RN, assumed care of pt.   POC and medications reviewed with pt. Pt verbalized understanding.   AOx4  Denies pain, SOB, or dizziness at this time.   Wound dressings in place.  Safety measures in place.  Hourly rounding in place.

## 2023-02-27 NOTE — THERAPY
Physical Therapy Contact Note    Patient Name: Osvaldo Pate  Age:  72 y.o., Sex:  male  Medical Record #: [id number]  Today's Date: 2/27/2023 02/27/23 1219   Initial Contact Note    Initial Contact Note Order Received and Verified. Physical Therapy Evaluation NOT Completed Because Patient Does Not Require Acute Physical Therapy at this Time.   Interdisciplinary Plan of Care Collaboration   IDT Collaboration with  Nursing   Collaboration Comments Orders for PT eval received, pt with history of C5 quadriplegia and uses a brigid lift for transfer at home into power chair. There are no skilled acute PT needs at this time, recommend RN staff encourage brigid transfer to his power chair as able. Will DC PT.

## 2023-02-27 NOTE — THERAPY
Occupational Therapy Contact Note    Patient Name: Osvaldo Pate  Age:  72 y.o., Sex:  male  Medical Record #: [id number]  Today's Date: 2/27/2023    Discussed missed therapy with RN       02/27/23 1001   Interdisciplinary Plan of Care Collaboration   Collaboration Comments OT orders rec'd.  Pt with h/o C5-7 quadriplegia.  Has been at a group home, uses brigid lift for transfer to his power chair.  Pt able to complete simple feeding, grooming and some other self care tasks with setup from seated. He appears to be functionally at baseline.  He is here for medical mgmt, at this time does not appear to have any acute therapy needs.  D/w RN, will cancel OT orders.

## 2023-02-27 NOTE — CARE PLAN
The patient is Stable - Low risk of patient condition declining or worsening    Shift Goals  Clinical Goals: wound care this shift  Patient Goals: rest comfortably    Progress made toward(s) clinical / shift goals:  Wound consult in, monitor dressing status throughout shift    Patient is not progressing towards the following goals:

## 2023-02-27 NOTE — PROGRESS NOTES
4 Eyes Skin Assessment Completed by ZAIRA Pierre and ZAIRA Monk.    Head WDL  Ears WDL  Nose WDL  Mouth WDL  Neck WDL  Breast/Chest WDL  Shoulder Blades WDL  Spine WDL  (R) Arm/Elbow/Hand Bruising  (L) Arm/Elbow/Hand Bruising  Abdomen Ostomy LLQ, SP lower ABD with redness and surrounding exudate   Groin WDL  Scrotum/Coccyx/Buttocks, ulcer noted at sacrum  (R) Leg Abrasion  (L) Leg Left calf medial ulceration, Left heel ulceration, Left knee abrasion   (R) Heel/Foot/Toe WDL  (L) Heel/Foot/Toe Left heel ulceration          Devices In Places Blood Pressure Cuff      Interventions In Place Gray Ear Foams, Heel Mepilex, Sacral Mepilex, Heel Float Boots, TAP System, Q2 Turns, and Low Air Loss Mattress    Possible Skin Injury Yes    Pictures Uploaded Into Epic Yes  Wound Consult Placed Yes  RN Wound Prevention Protocol Ordered Yes

## 2023-02-28 PROCEDURE — A9270 NON-COVERED ITEM OR SERVICE: HCPCS | Performed by: HOSPITALIST

## 2023-02-28 PROCEDURE — 700111 HCHG RX REV CODE 636 W/ 250 OVERRIDE (IP): Performed by: HOSPITALIST

## 2023-02-28 PROCEDURE — 770001 HCHG ROOM/CARE - MED/SURG/GYN PRIV*

## 2023-02-28 PROCEDURE — 700105 HCHG RX REV CODE 258: Performed by: HOSPITALIST

## 2023-02-28 PROCEDURE — 99232 SBSQ HOSP IP/OBS MODERATE 35: CPT | Performed by: INTERNAL MEDICINE

## 2023-02-28 PROCEDURE — 700102 HCHG RX REV CODE 250 W/ 637 OVERRIDE(OP): Performed by: HOSPITALIST

## 2023-02-28 PROCEDURE — 94760 N-INVAS EAR/PLS OXIMETRY 1: CPT

## 2023-02-28 RX ADMIN — MEROPENEM 500 MG: 500 INJECTION INTRAVENOUS at 04:50

## 2023-02-28 RX ADMIN — FERROUS SULFATE TAB 325 MG (65 MG ELEMENTAL FE) 325 MG: 325 (65 FE) TAB at 17:26

## 2023-02-28 RX ADMIN — BACLOFEN 20 MG: 10 TABLET ORAL at 09:20

## 2023-02-28 RX ADMIN — AMLODIPINE BESYLATE 10 MG: 5 TABLET ORAL at 09:20

## 2023-02-28 RX ADMIN — Medication 10 MG: at 21:03

## 2023-02-28 RX ADMIN — BACLOFEN 20 MG: 10 TABLET ORAL at 17:31

## 2023-02-28 RX ADMIN — BACLOFEN 20 MG: 10 TABLET ORAL at 21:03

## 2023-02-28 RX ADMIN — MEROPENEM 500 MG: 500 INJECTION INTRAVENOUS at 17:30

## 2023-02-28 RX ADMIN — BACLOFEN 20 MG: 10 TABLET ORAL at 12:58

## 2023-02-28 RX ADMIN — MAGNESIUM GLUCONATE 500 MG ORAL TABLET 400 MG: 500 TABLET ORAL at 05:59

## 2023-02-28 RX ADMIN — POLYETHYLENE GLYCOL 3350 1 PACKET: 17 POWDER, FOR SOLUTION ORAL at 17:32

## 2023-02-28 RX ADMIN — MEROPENEM 500 MG: 500 INJECTION INTRAVENOUS at 12:58

## 2023-02-28 RX ADMIN — FERROUS SULFATE TAB 325 MG (65 MG ELEMENTAL FE) 325 MG: 325 (65 FE) TAB at 06:00

## 2023-02-28 RX ADMIN — Medication 2000 UNITS: at 06:00

## 2023-02-28 RX ADMIN — SENNOSIDES AND DOCUSATE SODIUM 2 TABLET: 50; 8.6 TABLET ORAL at 17:26

## 2023-02-28 RX ADMIN — ASPIRIN 81 MG: 81 TABLET, COATED ORAL at 06:00

## 2023-02-28 RX ADMIN — ZINC SULFATE 220 MG (50 MG) CAPSULE 220 MG: CAPSULE at 05:59

## 2023-02-28 RX ADMIN — LINEZOLID 600 MG: 600 INJECTION, SOLUTION INTRAVENOUS at 18:05

## 2023-02-28 RX ADMIN — OXYCODONE HYDROCHLORIDE AND ACETAMINOPHEN 1000 MG: 500 TABLET ORAL at 06:00

## 2023-02-28 RX ADMIN — SENNOSIDES AND DOCUSATE SODIUM 2 TABLET: 50; 8.6 TABLET ORAL at 05:59

## 2023-02-28 RX ADMIN — LINEZOLID 600 MG: 600 INJECTION, SOLUTION INTRAVENOUS at 05:56

## 2023-02-28 RX ADMIN — LOSARTAN POTASSIUM 50 MG: 25 TABLET, FILM COATED ORAL at 21:03

## 2023-02-28 ASSESSMENT — PAIN DESCRIPTION - PAIN TYPE
TYPE: CHRONIC PAIN
TYPE: CHRONIC PAIN

## 2023-02-28 ASSESSMENT — ENCOUNTER SYMPTOMS
FEVER: 1
CHILLS: 1

## 2023-02-28 NOTE — WOUND TEAM
[facility]  Inpatient Services  Initial Wound and Skin Care Evaluation    Admission Date: 2/26/2023     Last order of IP CONSULT TO WOUND CARE was found on 2/27/2023 from Hospital Encounter on 2/26/2023     HPI, PMH, SH: Reviewed    Past Surgical History:   Procedure Laterality Date    IRRIGATION & DEBRIDEMENT GENERAL Left 04/26/2022    Procedure: IRRIGATION AND DEBRIDEMENT, WOUND - LEG;  Surgeon: Eric Raman M.D.;  Location: [facility];  Service: Orthopedics    WOUND CLOSURE NEURO Left 04/26/2022    Procedure: CLOSURE, WOUND;  Surgeon: Eric Raman M.D.;  Location: [facility];  Service: Orthopedics    ORTHOPEDIC OSTEOTOMY Left 04/26/2022    Procedure: OSTECTOMY;  Surgeon: Eric Raman M.D.;  Location: [facility];  Service: Orthopedics    INCISION AND DRAINAGE ORTHOPEDIC Left 01/27/2022    Procedure: INCISION AND DRAINAGE, WOUND, BY ORTHOPEDICS;  Surgeon: Erasmo Stewart M.D.;  Location: [facility];  Service: Orthopedics    BONE BIOPSY Left 01/27/2022    Procedure: BIOPSY, BONE;  Surgeon: Erasmo Stewart M.D.;  Location: [facility];  Service: Orthopedics    INCISION AND DRAINAGE ORTHOPEDIC  01/22/2022    Procedure: INCISION AND DRAINAGE, WOUND, BY ORTHOPEDICS;  Surgeon: Erasmo Stewart M.D.;  Location: [facility];  Service: Orthopedics    ND CYSTOSCOPY,INSERT URETERAL STENT Left 01/04/2022    Procedure: CYSTOSCOPY, WITH URETERAL STENT INSERTION;  Surgeon: Osvaldo Baltazar M.D.;  Location: [facility];  Service: Urology    ND CYSTO/URETERO/PYELOSCOPY, DX Left 01/04/2022    Procedure: URETEROSCOPY;  Surgeon: Osvaldo Baltazar M.D.;  Location: [facility];  Service: Urology    LASERTRIPSY N/A 01/04/2022    Procedure: LITHOTRIPSY, USING LASER;  Surgeon: Osvaldo Baltazar M.D.;  Location: [facility];  Service: Urology    ND CYSTOSCOPY,INSERT URETERAL STENT Left 12/16/2021    Procedure: CYSTOSCOPY,  WITH URETERAL STENT INSERTION;  Surgeon: Aly Bowen M.D.;  Location: [facility];  Service: Urology    CT CYSTO/URETERO/PYELOSCOPY, DX Left 2021    Procedure: URETEROSCOPY;  Surgeon: Aly Bowen M.D.;  Location: [facility];  Service: Urology    LASERTRIPSY Left 2021    Procedure: LITHOTRIPSY, USING LASER;  Surgeon: Aly Bowen M.D.;  Location: [facility];  Service: Urology    IRRIGATION & DEBRIDEMENT GENERAL  2020    Procedure: IRRIGATION AND DEBRIDEMENT, WOUND SACRAL ULCER;  Surgeon: Matt Cummins M.D.;  Location: [facility];  Service: Plastics    ULCER DEBRIDEMENT N/A 2019    Procedure: debridement of Sacral grade 4 ulcer - W/BONE BIOPSY, 3 liter wash out. bilateral sliding gluteal myocutaneous flap advancement;  Surgeon: Amadeo Moon M.D.;  Location: [facility];  Service: Plastics    FLAP CLOSURE  2019    Procedure: CLOSURE, FLAP - MUSCLE;  Surgeon: Amadeo Moon M.D.;  Location: [facility];  Service: Plastics    COLOSTOMY N/A 2019    Procedure: CREATION, COLOSTOMY -  placement;  Surgeon: Elías Hannah M.D.;  Location: [facility];  Service: General    COLOSTOMY      COLOSTOMY TAKEDOWN      HERNIA REPAIR      ORIF, ANKLE      PERCUTANEOUOSPINNING LOWER EXTREMITY       Social History     Tobacco Use    Smoking status: Former     Packs/day: 1.00     Years: 10.00     Pack years: 10.00     Types: Cigarettes     Start date: 1967     Quit date: 1977     Years since quittin.1    Smokeless tobacco: Never   Substance Use Topics    Alcohol use: Yes     Alcohol/week: 4.2 oz     Types: 7 Standard drinks or equivalent per week     Comment: Nightly     Chief Complaint   Patient presents with    Fever     Diagnosis: Pyelonephritis [N12]    Unit where seen by Wound Team:      WOUND CONSULT/FOLLOW UP RELATED TO:  coccyx, LLE, colostomy     WOUND HISTORY:   Pt has had coccyx wound for years. LLE also slow to resolve. He is able to empty his colostomy but needs someone to change it for him    WOUND ASSESSMENT/LDA  Wound 11/11/22 LLE Medial (anterior and posterior merged) (Active)   Wound Image   02/27/23 1900   Site Assessment Pink;Boggy    Periwound Assessment Intact;Pink    Margins Defined edges    Drainage Amount Scant    Drainage Description Serosanguineous    Treatments Cleansed    Wound Cleansing Normal Saline Irrigation    Periwound Protectant Skin Protectant Wipes to Periwound    Dressing Cleansing/Solutions Not Applicable    Dressing Options Hydrofera Blue Ready;Silicone Adhesive Foam    Dressing Changed Changed    Dressing Change/Treatment Frequency Monday, Wednesday, Friday, and As Needed    NEXT Dressing Change/Treatment Date 03/01/23    Non-staged wound description Slow to resolve full thickness    Wound Length (cm) 1.7 cm 02/27/23 1900   Wound Width (cm) 0.7 cm 02/27/23 1900   Wound Depth (cm) 0.2 cm 02/27/23 1900   Wound Surface Area (cm^2) 1.19 cm^2 02/27/23 1900   Wound Volume (cm^3) 0.238 cm^3 02/27/23 1900   Wound Healing % 65 02/27/23 1900   Wound Odor Mild    Exposed Structures None    Number of days: 108       Wound 02/27/23 Pressure Injury Sacrum (Active)   Wound Image    02/27/23 1900   Site Assessment Red;Drainage    Periwound Assessment Pink    Margins Defined edges    Closure Secondary intention    Drainage Amount Scant    Drainage Description Serosanguineous    Treatments Cleansed    Wound Cleansing Normal Saline Irrigation    Periwound Protectant Skin Protectant Wipes to Periwound    Dressing Cleansing/Solutions Not Applicable    Dressing Options Hydrofiber Silver;Mepilex    Dressing Changed Changed    Dressing Status Intact    Dressing Change/Treatment Frequency Monday, Wednesday, Friday, and As Needed    NEXT Dressing Change/Treatment Date 03/01/23    NEXT Weekly Photo (Inpatient Only) 03/06/23    WOUND NURSE ONLY - Pressure Injury Stage 4  02/27/23 1900   Wound Length (cm) 2 cm 02/27/23 1900   Wound Width (cm) 1.3 cm 02/27/23 1900   Wound Depth (cm) 1.4 cm 02/27/23 1900   Wound Surface Area (cm^2) 2.6 cm^2 02/27/23 1900   Wound Volume (cm^3) 3.64 cm^3 02/27/23 1900   Shape circular    Wound Odor None    Exposed Structures KEN    Number of days: 0       Wound 02/27/23 Heel Left (Active)   Wound Image    02/27/23 1900   Site Assessment Red    Periwound Assessment Scar tissue    Closure Secondary intention    Drainage Amount Scant    Drainage Description Serosanguineous    Treatments Cleansed    Wound Cleansing Normal Saline Irrigation    Periwound Protectant Not Applicable    Dressing Cleansing/Solutions Not Applicable    Dressing Options Hydrofiber Silver;Mepilex    Dressing Changed Changed    Dressing Status Intact    Dressing Change/Treatment Frequency Monday, Wednesday, Friday, and As Needed    NEXT Dressing Change/Treatment Date 03/01/23    NEXT Weekly Photo (Inpatient Only) 03/06/23    WOUND NURSE ONLY - Pressure Injury Stage 3 reopening 02/27/23 1900   Wound Length (cm) 5 cm 02/27/23 1900   Wound Width (cm) 2 cm 02/27/23 1900   Wound Depth (cm) 0.1 cm 02/27/23 1900   Wound Surface Area (cm^2) 10 cm^2 02/27/23 1900   Wound Volume (cm^3) 1 cm^3 02/27/23 1900   Shape oval    Wound Odor None    Exposed Structures None    Number of days: 0        Vascular:    JUAN MANUEL:   No results found.    Lab Values:    Lab Results   Component Value Date/Time    WBC 6.5 02/27/2023 01:59 AM    RBC 3.80 (L) 02/27/2023 01:59 AM    HEMOGLOBIN 13.2 (L) 02/27/2023 01:59 AM    HEMATOCRIT 40.2 (L) 02/27/2023 01:59 AM    CREACTPROT 0.82 (H) 04/01/2022 02:26 PM    SEDRATEWES 23 (H) 04/01/2022 02:26 PM        Culture Results show:  No results found for this or any previous visit (from the past 720 hour(s)).    Pain Level/Medicated:  None, Tolerated without pain medication       INTERVENTIONS BY WOUND TEAM:  Chart and images reviewed. Discussed with bedside RN. All areas of concern  (based on picture review, LDA review and discussion with bedside RN) have been thoroughly assessed. Documentation of areas based on significant findings. This RN in to assess patient. Performed standard wound care which includes appropriate positioning, dressing removal and non-selective debridement. Pictures and measurements obtained weekly if/when required.  Coccyx  R heel  Preparation for Dressing removal: Dressing soaked with Removed without difficulty  Non-selectively Debrided with:  Normal Saline and Gauze   Sharp debridement: NA  Shira wound: Cleansed with Normal Saline and Gauze, Prepped with No Sting  Primary Dressing: Aquacel ag (Hydrofiber Silver)  Secondary (Outer) Dressing: Mepilex  sacral and heel    LLE  Preparation for Dressing removal: Dressing soaked with Removed without difficulty  Non-selectively Debrided with:  Normal Saline and Gauze   Sharp debridement: NA  Shira wound: Cleansed with Normal Saline and Gauze, Prepped with No Sting  Primary Dressing: Hydrofera Blue  Secondary (Outer) Dressing: Adhesive foam      Advanced Wound Care Discharge Planning  Number of Clinicians necessary to complete wound care: 1  Is patient requiring IV pain medications for dressing changes: no  Length of time for dressing change 45 min. (This does not include chart review, pre-medication time, set up, clean up or time spent charting.)    Interdisciplinary consultation: Patient, Bedside RN  ,     EVALUATION / RATIONALE FOR TREATMENT:  Most Recent Date:  2/27: Pt has resolving full thickness wounds to LLE, there are some red areas proximal to LLE and on lateral R knee.  Adhesive foam for protection. L heel with reopening St 3 as evidenced by scar tissue. Silver hydrofiber and heel adhesive foam drsg. Aquacel Ag Hydrofiber applied to manage bioburden, absorb exudate, and maintain a moist wound environment without laterally wicking exudate therefore reducing shira-wound maceration       Goals: Steady decrease in wound area  and depth weekly.     WOUND TEAM PLAN OF CARE ([X] for frequency of wound follow up,):   Nursing to follow dressing orders written for wound care. Contact wound team if area fails to progress, deteriorates or with any questions/concerns if something comes up before next scheduled follow up (See below as to whether wound is following and frequency of wound follow up)  Dressing changes by wound team:                   Follow up 3 times weekly:                NPWT change 3 times weekly:     Follow up 1-2 times weekly:   x   Follow up Bi-Monthly:           Follow up Monthly (High Risk):                        Follow up as needed:     Other (explain):     NURSING PLAN OF CARE ORDERS (X):  Dressing changes: See Dressing Care orders: x  Skin care: See Skin Care orders:   RN Prevention Protocol: x  Rectal tube care: See Rectal Tube Care orders:   Other orders:    RSKIN:   CURRENTLY IN PLACE (X), APPLIED THIS VISIT (A), ORDERED (O):   Q shift Luis Enrique:  X  Q shift pressure point assessments:  X    Surface/Positioning   Pressure redistribution mattress            Low Airloss    x      ICU Low Airloss   Bariatric JERARDO     Waffle cushion        Waffle Overlay          Reposition q 2 hours   x   TAPs Turning system     Z Pankaj Pillow     Offloading/Redistribution   Sacral Mepilex (Silicone dressing)   x  Heel Mepilex (Silicone dressing)   x      Heel float boots (Prevalon boot)    x         Float Heels off Bed with Pillows           Respiratory RA  Silicone O2 tubing         Gray Foam Ear protectors     Cannula fixation Device (Tender )          High flow offloading Clip    Elastic head band offloading device      Anchorfast                                                         Trach with Optifoam split foam             Containment/Moisture Prevention colostomy    Rectal tube or BMS    Purwick/Condom Cath        Cazares Catheter  x  Barrier wipes           Barrier paste       Antifungal tx      Interdry        Mobilization not  assessed      Up to chair        Ambulate      PT/OT      Nutrition       Dietician        Diabetes Education      PO   x  TF     TPN     NPO   # days     Other        Anticipated discharge plans: group home HH and outpt wound clinic  LTACH:        SNF/Rehab:                  Home Health Care:           Outpatient Wound Center:            Self/Family Care:        Other:                  Vac Discharge Needs:   Vac Discharge plan is purely a recommendation from wound team and not a requirement for discharge unless otherwise stated by physician.  Not Applicable Pt not on a wound vac:   x    Regular Vac while inpatient, alternative dressing at DC:        Regular Vac in use and continued at DC:            Reg. Vac w/ Skin Sub/Biologic in use. Will need to be changed 2x wkly:      Veraflo Vac while inpatient, ok to transition to Regular Vac on Discharge (Bedside RN to Clamp small instillation tubing at time of DC):           Veraflo Vac while inpatient, would benefit from remaining on Veraflo Vac upon discharge:

## 2023-02-28 NOTE — CARE PLAN
The patient is Stable - Low risk of patient condition declining or worsening    Shift Goals  Clinical Goals: Pt will be turned at least every 2 hrs; continue IV abx.  Patient Goals: Rest and sleep.  Family Goals: n/a    Progress made toward(s) clinical / shift goals:    Pt was free of pain and falls during shift; turned Q2 hrs. Pt continued on IV abx. Patient is not progressing towards the following goals: N/A

## 2023-02-28 NOTE — HOSPITAL COURSE
"Per notes,\"2/26/2023-Osvaldo Pate is a 72 y.o. male who presented 2/26/2023 with fever and chills.  The patient says for the past 2 to 3 days he has not been feeling well.  General weakness and tiredness loss of appetite.  The patient also has developed fevers of 103 today.  The patient does was brought in from his group home where he resides due to paraplegia from the C5-7 vertebrae.  The patient at this point has developed an acute pyelonephritis with dirty urine as well as a history of ESBL.  Patient will be started on meropenem for IV antibiotics.  Blood and urine cultures will be strictly followed.  Patient also has multiple wounds including 1 in the gluteal region that is a decubital ulcer as well as 1 on the left ankle area.  These at this point will need wound care as well as aggressive antibiotic management as the patient does have a history of ESBL and enterococcal infection.  The Enterococcus at this point will need to be treated aggressively Zyvox and for possible ESBL we will put the patient on meropenem.  We will follow cultures very closely if culture results come back and we are able to change antibiotics we will.  Patient will need chronic management of his medical issues otherwise.  We will continue at this point with nutritional support, monitoring of his atrial fibrillation, he does have a Watchman procedure done in the past controlling the atrial fibrillation.  The patient does not need chronic anticoagulation.  We will monitor electrolytes as well as blood counts given the fact that the patient will be on several aggressive antibiotic management therapies.  2/27/2023-patient at this point has improved considerably.  He has been afebrile overnight.  Vital signs at this point are stable.  We are at this point awaiting culture results from blood, urine, wounds.  Wound care is to see the patient and continue at this point with wound dressing changes.  Images will need to be uploaded into " "the chart on a continuous basis.  Patient's overall condition is improving.  Since the patient has a history of ESBL as well as enterococcal infection we await those results.  Once those results come back antibiotic management will need to be stratified.\"  "
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
## 2023-02-28 NOTE — DOCUMENTATION QUERY
[facility]                                                                       Query Response Note      PATIENT:               NICHOLAS CASEY  ACCT #:                  [id number]  MRN:                     [id number]  :                      1950  ADMIT DATE:       2023 9:26 AM  DISCH DATE:          RESPONDING  PROVIDER #:        [id number]           QUERY TEXT:    Please clarify in documentation the relationship, if any, between pyelonephritis and suprapubic catheter    The patient's Clinical Indicators include:  - Findings:  ED note:  hutchins catheter was clogged yesterday and he could not get it to flush. He called his home nurse to come change his hutchins  and she noted a significant amount of sediment. Patient then woke up with a fever this morning and is concerned for a UTI.  history of paraplegia, suprapubic catheter    - H&P:  The patient at this point has developed an acute pyelonephritis with dirty urine as well as a history of ESBL.  Patient  will be started on meropenem for IV antibiotics. Patient has a suprapubic catheter     - Treatments:  antibiotics, UA, cultures     - Risk factors:  history of ESBL, quadriplegia, neurogenic bladder, suprapubic catheter     Thank You,  Saira Pringle RN  Clinical Documentation   [email or web address]  Connect via [email or web address] Messenger  Options provided:   -- Pyelonephritis is due to or associated with suprapubic catheter   -- Pyelonephritis is not due to or associated with suprapubic catheter   -- Other explanation, Please specify other explanation   -- Unable to determine      Query created by: Saira Pringle on 2023 1:46 PM    RESPONSE TEXT:    Unable to determine          Electronically signed by:  MANI ABDALLA MD 2023 3:02 PM

## 2023-02-28 NOTE — DIETARY
Nutrition Services:    Consult for diet education noted; however, specifics of ed not noted in consult. Pt denied having any education requests.     Pt has a stage 4 PI on his sacrum, a resolving full thickness wound on his LLE, and reopening stage 3 on his left heel. Wound team following.     Pt is currently on a regular diet and per chart pt PO 50-75%. He is also receiving Boost Plus TID with meals. He said that he is drinking this supplement. PO intake most likely adequate to encourage wound healing. No report of poor PO PTA. Pt is also receiving vitamin C and zinc for wound healing.     Ht: 177.8 cm, Wt: 101 kg, BMI 31.85. No report of wt loss PTA per nutrition screen.     Consult RD as needed. RD will re-screen weekly.      RD available prn

## 2023-02-28 NOTE — PROGRESS NOTES
Care assumed from Lisbet MENESES, agree with shift assessment    Complex Repair And Graft Additional Text (Will Appearing After The Standard Complex Repair Text): The complex repair was not sufficient to completely close the primary defect. The remaining additional defect was repaired with the graft mentioned below.

## 2023-02-28 NOTE — WOUND TEAM
"  [facility]     Inpatient Services     Established Ostomy Management/ troubleshooting      HPI: Reviewed  PMH: Reviewed   SH: Reviewed   Reason for Ostomy nurse consult:  Established colostomy    Objective: Appliance is changed weekly on Monday    Colostomy LUQ (Active)   Stomal Appliance Assessment Intact 02/27/23 1900   Stoma Assessment Beefy red 02/27/23 1900   Stoma Shape Oval 02/27/23 1900   Stoma Size (in) 1.8 02/27/23 1900   Peristomal Assessment Intact 02/27/23 1900   Mucocutaneous Junction Intact 02/27/23 1900   Treatment Cleansed with water/washcloth;Appliance Changed 02/27/23 1900   Stomal Appliance 2 3/4\" (70mm) CTF;Paste Ring, 2\" 02/27/23 1900   Output (mL) 100 mL 02/27/23 1900   Output Color Brown 02/27/23 1900   WOUND RN ONLY - Stomal Appliance  2 Piece;2 3/4\" (70mm) CTF;Paste Ring, 2\";Brava Strips 02/27/23 1900   Appliance (Pouch) # [id number],  barrier [id number], [place] [id number] 02/27/23 1900   Appliance Brand [place] 02/27/23 1900           Ostomy Appliance (type and size): 2 piece 2.75\" with AK and Brava strips  (used hypafix tape to reinforce since Brava strips unavailable     Interventions and Education: Removed appliance using a push pull method. Cleaned stoma and peristomal skin with moist warm washcloths. Made template and traced to barrier. Barrier cut, checked fit. Paste ring stretched to fit opening and then applied to back of barrier. Applied barrier to skin and adhered with friction. Attached pouch with end already closed.       Evaluation: functioning colostomy    Plan: Bedside RNs to assist pt with appliance changes. Ostomy RN to remain available PRN for any needs.    Anticipated discharge needs: back to group home with HH and outpt wound clinic    "

## 2023-02-28 NOTE — PROGRESS NOTES
Bedside report received from NOC RN. Assumed care of patient. Daily plan of care discussed. Pt awake and alert, personal belongings and call light within reach.    Patent reports pain level 0/10. IV fluids running at this time. Fall precautions and isolation precautions placed. Patient reports no needs at this time.

## 2023-02-28 NOTE — PROGRESS NOTES
Received report from dayshift RN. Pt resting in bed. Pt A&O x4, on RA. Pt denies pain at this time. Pt continued on IV fluids and IV abx. Pt updated with POC which includes IV abx. Call light within reach, fall precautions in place, all needs met at this time.

## 2023-03-01 ENCOUNTER — APPOINTMENT (OUTPATIENT)
Dept: RADIOLOGY | Facility: MEDICAL CENTER | Age: 73
DRG: 689 | End: 2023-03-01
Attending: INTERNAL MEDICINE
Payer: MEDICARE

## 2023-03-01 PROCEDURE — 700105 HCHG RX REV CODE 258: Performed by: HOSPITALIST

## 2023-03-01 PROCEDURE — 700102 HCHG RX REV CODE 250 W/ 637 OVERRIDE(OP): Performed by: INTERNAL MEDICINE

## 2023-03-01 PROCEDURE — 770001 HCHG ROOM/CARE - MED/SURG/GYN PRIV*

## 2023-03-01 PROCEDURE — 700111 HCHG RX REV CODE 636 W/ 250 OVERRIDE (IP): Performed by: HOSPITALIST

## 2023-03-01 PROCEDURE — 700102 HCHG RX REV CODE 250 W/ 637 OVERRIDE(OP): Performed by: HOSPITALIST

## 2023-03-01 PROCEDURE — A9270 NON-COVERED ITEM OR SERVICE: HCPCS | Performed by: INTERNAL MEDICINE

## 2023-03-01 PROCEDURE — A9270 NON-COVERED ITEM OR SERVICE: HCPCS | Performed by: HOSPITALIST

## 2023-03-01 PROCEDURE — 71260 CT THORAX DX C+: CPT

## 2023-03-01 PROCEDURE — 99232 SBSQ HOSP IP/OBS MODERATE 35: CPT | Performed by: INTERNAL MEDICINE

## 2023-03-01 PROCEDURE — 700117 HCHG RX CONTRAST REV CODE 255: Performed by: INTERNAL MEDICINE

## 2023-03-01 RX ORDER — AMLODIPINE BESYLATE 5 MG/1
10 TABLET ORAL EVERY MORNING
Status: DISCONTINUED | OUTPATIENT
Start: 2023-03-01 | End: 2023-03-02 | Stop reason: HOSPADM

## 2023-03-01 RX ADMIN — MAGNESIUM GLUCONATE 500 MG ORAL TABLET 400 MG: 500 TABLET ORAL at 05:34

## 2023-03-01 RX ADMIN — BACLOFEN 20 MG: 10 TABLET ORAL at 17:44

## 2023-03-01 RX ADMIN — ASPIRIN 81 MG: 81 TABLET, COATED ORAL at 05:33

## 2023-03-01 RX ADMIN — MEROPENEM 500 MG: 500 INJECTION INTRAVENOUS at 17:43

## 2023-03-01 RX ADMIN — MEROPENEM 500 MG: 500 INJECTION INTRAVENOUS at 00:05

## 2023-03-01 RX ADMIN — BACLOFEN 20 MG: 10 TABLET ORAL at 22:24

## 2023-03-01 RX ADMIN — SENNOSIDES AND DOCUSATE SODIUM 2 TABLET: 50; 8.6 TABLET ORAL at 17:45

## 2023-03-01 RX ADMIN — OXYCODONE HYDROCHLORIDE AND ACETAMINOPHEN 1000 MG: 500 TABLET ORAL at 05:34

## 2023-03-01 RX ADMIN — BACLOFEN 20 MG: 10 TABLET ORAL at 12:28

## 2023-03-01 RX ADMIN — MEROPENEM 500 MG: 500 INJECTION INTRAVENOUS at 23:56

## 2023-03-01 RX ADMIN — LINEZOLID 600 MG: 600 INJECTION, SOLUTION INTRAVENOUS at 06:10

## 2023-03-01 RX ADMIN — Medication 2000 UNITS: at 05:34

## 2023-03-01 RX ADMIN — FERROUS SULFATE TAB 325 MG (65 MG ELEMENTAL FE) 325 MG: 325 (65 FE) TAB at 05:34

## 2023-03-01 RX ADMIN — MEROPENEM 500 MG: 500 INJECTION INTRAVENOUS at 05:32

## 2023-03-01 RX ADMIN — ZINC SULFATE 220 MG (50 MG) CAPSULE 220 MG: CAPSULE at 05:34

## 2023-03-01 RX ADMIN — LOSARTAN POTASSIUM 50 MG: 25 TABLET, FILM COATED ORAL at 22:24

## 2023-03-01 RX ADMIN — IOHEXOL 100 ML: 350 INJECTION, SOLUTION INTRAVENOUS at 16:51

## 2023-03-01 RX ADMIN — BACLOFEN 20 MG: 10 TABLET ORAL at 08:08

## 2023-03-01 RX ADMIN — AMLODIPINE BESYLATE 10 MG: 5 TABLET ORAL at 12:28

## 2023-03-01 RX ADMIN — SENNOSIDES AND DOCUSATE SODIUM 2 TABLET: 50; 8.6 TABLET ORAL at 05:34

## 2023-03-01 RX ADMIN — IOHEXOL 20 ML: 240 INJECTION, SOLUTION INTRATHECAL; INTRAVASCULAR; INTRAVENOUS; ORAL at 16:51

## 2023-03-01 RX ADMIN — FERROUS SULFATE TAB 325 MG (65 MG ELEMENTAL FE) 325 MG: 325 (65 FE) TAB at 17:44

## 2023-03-01 RX ADMIN — Medication 10 MG: at 22:24

## 2023-03-01 RX ADMIN — MEROPENEM 500 MG: 500 INJECTION INTRAVENOUS at 12:28

## 2023-03-01 ASSESSMENT — ENCOUNTER SYMPTOMS
CHILLS: 1
FEVER: 1

## 2023-03-01 ASSESSMENT — PAIN DESCRIPTION - PAIN TYPE: TYPE: CHRONIC PAIN

## 2023-03-01 NOTE — PROGRESS NOTES
Pt care assumed and assessment completed. Pt denies pain at this time, repositioned for comfort. Colostomy and suprapubic catheter bag emptied. Pt updated with POC which includes IV abx therapy. Call light within reach, bed locked and in lowest position, pt declines additional needs or questions at this time.

## 2023-03-01 NOTE — CARE PLAN
The patient is Stable - Low risk of patient condition declining or worsening    Shift Goals  Clinical Goals: Patient will remain afebrile during shift, tolerate q2 turns  Patient Goals: rest  Family Goals: n/a    Progress made toward(s) clinical / shift goals:  Patient remained afebrile during shift and tolerated q2 turns.       Problem: Fall Risk  Goal: Patient will remain free from falls  Outcome: Progressing     Problem: Fluid Volume  Goal: Fluid volume balance will be maintained  Outcome: Progressing     Problem: Infection - Standard  Goal: Patient will remain free from infection  Outcome: Progressing     Problem: Knowledge Deficit - Standard  Goal: Patient and family/care givers will demonstrate understanding of plan of care, disease process/condition, diagnostic tests and medications  Outcome: Progressing       Patient is not progressing towards the following goals:

## 2023-03-01 NOTE — PROGRESS NOTES
Pt resting comfortably in bed, eating breakfast, not in any distress, on RA. AAOx4. Suprapubic cath draining clear urine. Colostomy emptied. Personal items and call light within reach. All needs met at this time. Hourly rounding in place.

## 2023-03-01 NOTE — CARE PLAN
The patient is Stable - Low risk of patient condition declining or worsening    Shift Goals  Clinical Goals: pt will be able to go for CT and contiue IV abx  Patient Goals: Sleep and rest  Family Goals: n/a    Progress made toward(s) clinical / shift goals:  Pt was able to have CT completed and continued IV ABX on shift    Patient is not progressing towards the following goals:
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
## 2023-03-01 NOTE — CARE PLAN
The patient is Stable - Low risk of patient condition declining or worsening    Shift Goals  Clinical Goals: Pt will be repositioned at least Q2 hrs during the shift; Continue pt on abx therapy  Patient Goals: Sleep and rest  Family Goals: n/a    Progress made toward(s) clinical / shift goals:  Pt assisted with Q2 hr repositioning; IV abx therapy continued as ordered. Pt denies pain overnight, seen sleeping and resting comfortably during rounding.      Problem: Knowledge Deficit - Standard  Goal: Patient and family/care givers will demonstrate understanding of plan of care, disease process/condition, diagnostic tests and medications  Outcome: Progressing     Problem: Skin Integrity  Goal: Skin integrity is maintained or improved  Outcome: Progressing     Problem: Fall Risk  Goal: Patient will remain free from falls  Outcome: Progressing     Problem: Nutrition  Goal: Enteral nutrition will be maintained or improve  Outcome: Progressing     Problem: Self Care  Goal: Patient will have the ability to perform ADLs independently or with assistance (bathe, groom, dress, toilet and feed)  Outcome: Progressing     Problem: Infection - Standard  Goal: Patient will remain free from infection  Outcome: Progressing     Problem: Wound/ / Incision Healing  Goal: Patient's wound/surgical incision will decrease in size and heals properly  Outcome: Progressing     Patient is not progressing towards the following goals: NA

## 2023-03-02 VITALS
BODY MASS INDEX: 34.97 KG/M2 | SYSTOLIC BLOOD PRESSURE: 120 MMHG | WEIGHT: 244.27 LBS | HEART RATE: 50 BPM | OXYGEN SATURATION: 94 % | DIASTOLIC BLOOD PRESSURE: 71 MMHG | TEMPERATURE: 97.8 F | HEIGHT: 70 IN | RESPIRATION RATE: 18 BRPM

## 2023-03-02 LAB
ANION GAP SERPL CALC-SCNC: 11 MMOL/L (ref 7–16)
BUN SERPL-MCNC: 19 MG/DL (ref 8–22)
CALCIUM SERPL-MCNC: 8.7 MG/DL (ref 8.4–10.2)
CHLORIDE SERPL-SCNC: 108 MMOL/L (ref 96–112)
CO2 SERPL-SCNC: 18 MMOL/L (ref 20–33)
CREAT SERPL-MCNC: 0.55 MG/DL (ref 0.5–1.4)
ERYTHROCYTE [DISTWIDTH] IN BLOOD BY AUTOMATED COUNT: 52.1 FL (ref 35.9–50)
GFR SERPLBLD CREATININE-BSD FMLA CKD-EPI: 105 ML/MIN/1.73 M 2
GLUCOSE SERPL-MCNC: 80 MG/DL (ref 65–99)
HCT VFR BLD AUTO: 39.1 % (ref 42–52)
HGB BLD-MCNC: 12.9 G/DL (ref 14–18)
MAGNESIUM SERPL-MCNC: 2.2 MG/DL (ref 1.5–2.5)
MCH RBC QN AUTO: 34.5 PG (ref 27–33)
MCHC RBC AUTO-ENTMCNC: 33 G/DL (ref 33.7–35.3)
MCV RBC AUTO: 104.5 FL (ref 81.4–97.8)
PLATELET # BLD AUTO: 135 K/UL (ref 164–446)
PMV BLD AUTO: 8.9 FL (ref 9–12.9)
POTASSIUM SERPL-SCNC: 4.7 MMOL/L (ref 3.6–5.5)
RBC # BLD AUTO: 3.74 M/UL (ref 4.7–6.1)
SODIUM SERPL-SCNC: 137 MMOL/L (ref 135–145)
WBC # BLD AUTO: 5.7 K/UL (ref 4.8–10.8)

## 2023-03-02 PROCEDURE — 99239 HOSP IP/OBS DSCHRG MGMT >30: CPT | Performed by: INTERNAL MEDICINE

## 2023-03-02 PROCEDURE — 36415 COLL VENOUS BLD VENIPUNCTURE: CPT

## 2023-03-02 PROCEDURE — 85027 COMPLETE CBC AUTOMATED: CPT

## 2023-03-02 PROCEDURE — A9270 NON-COVERED ITEM OR SERVICE: HCPCS | Performed by: HOSPITALIST

## 2023-03-02 PROCEDURE — 83735 ASSAY OF MAGNESIUM: CPT

## 2023-03-02 PROCEDURE — 80048 BASIC METABOLIC PNL TOTAL CA: CPT

## 2023-03-02 PROCEDURE — 700111 HCHG RX REV CODE 636 W/ 250 OVERRIDE (IP): Performed by: HOSPITALIST

## 2023-03-02 PROCEDURE — 94760 N-INVAS EAR/PLS OXIMETRY 1: CPT

## 2023-03-02 PROCEDURE — 700105 HCHG RX REV CODE 258: Performed by: HOSPITALIST

## 2023-03-02 PROCEDURE — 700102 HCHG RX REV CODE 250 W/ 637 OVERRIDE(OP): Performed by: HOSPITALIST

## 2023-03-02 RX ADMIN — MAGNESIUM GLUCONATE 500 MG ORAL TABLET 400 MG: 500 TABLET ORAL at 06:00

## 2023-03-02 RX ADMIN — ZINC SULFATE 220 MG (50 MG) CAPSULE 220 MG: CAPSULE at 05:01

## 2023-03-02 RX ADMIN — BACLOFEN 20 MG: 10 TABLET ORAL at 13:33

## 2023-03-02 RX ADMIN — Medication 2000 UNITS: at 05:02

## 2023-03-02 RX ADMIN — FERROUS SULFATE TAB 325 MG (65 MG ELEMENTAL FE) 325 MG: 325 (65 FE) TAB at 05:01

## 2023-03-02 RX ADMIN — SENNOSIDES AND DOCUSATE SODIUM 2 TABLET: 50; 8.6 TABLET ORAL at 05:02

## 2023-03-02 RX ADMIN — ASPIRIN 81 MG: 81 TABLET, COATED ORAL at 05:02

## 2023-03-02 RX ADMIN — MEROPENEM 500 MG: 500 INJECTION INTRAVENOUS at 11:24

## 2023-03-02 RX ADMIN — BACLOFEN 20 MG: 10 TABLET ORAL at 08:47

## 2023-03-02 RX ADMIN — POLYETHYLENE GLYCOL 3350 1 PACKET: 17 POWDER, FOR SOLUTION ORAL at 05:03

## 2023-03-02 RX ADMIN — OXYCODONE HYDROCHLORIDE AND ACETAMINOPHEN 1000 MG: 500 TABLET ORAL at 05:02

## 2023-03-02 RX ADMIN — MEROPENEM 500 MG: 500 INJECTION INTRAVENOUS at 05:08

## 2023-03-02 ASSESSMENT — PAIN DESCRIPTION - PAIN TYPE: TYPE: CHRONIC PAIN

## 2023-03-02 ASSESSMENT — FIBROSIS 4 INDEX: FIB4 SCORE: 2.22

## 2023-03-02 NOTE — FACE TO FACE
Face to Face Supporting Documentation - Home Health    The encounter with this patient was in whole or in part the primary reason for home health admission.    Date of encounter:   Patient:                    MRN:                       YOB: 2023  Osvaldo Pate  [id number]  1950     Home health to see patient for:  Skilled Nursing care for assessment, interventions & education, Wound Care, Medical social work consult, Physical Therapy evaluation and treatment, and Occupational therapy evaluation and treatment    Skilled need for:  paraplegia    Skilled nursing interventions to include:  Comment: pt/ot    Homebound status evidenced by:  Need the aid of supportive devices such as crutches, canes, wheelchairs or walkers, Require the use of special transportation, or Needs the assistance of another person in order to leave the home. Leaving home requires a considerable and taxing effort. There is a normal inability to leave the home.    Community Physician to provide follow up care: KATE Pretty     Optional Interventions? No      I certify the face to face encounter for this home health care referral meets the CMS requirements and the encounter/clinical assessment with the patient was, in whole, or in part, for the medical condition(s) listed above, which is the primary reason for home health care. Based on my clinical findings: the service(s) are medically necessary, support the need for home health care, and the homebound criteria are met.  I certify that this patient has had a face to face encounter by myself.  Robby Peng M.D. - NPI: [id number]

## 2023-03-02 NOTE — CARE PLAN
The patient is Stable - Low risk of patient condition declining or worsening    Shift Goals  Clinical Goals: Complete one dose of IV antibiotic, discharge home today if transport is available  Patient Goals: Go home  Family Goals: n/a    Progress made toward(s) clinical / shift goals:  Discharging today    Patient is not progressing towards the following goals:

## 2023-03-02 NOTE — DISCHARGE SUMMARY
"Discharge Summary    CHIEF COMPLAINT ON ADMISSION  Chief Complaint   Patient presents with    Fever       Reason for Admission  ems     Admission Date  2/26/2023    CODE STATUS  DNAR/DNI    HPI & HOSPITAL COURSE  Per notes,\"2/26/2023-Osvaldo Pate is a 72 y.o. male who presented 2/26/2023 with fever and chills.  The patient says for the past 2 to 3 days he has not been feeling well.  General weakness and tiredness loss of appetite.  The patient also has developed fevers of 103 today.  The patient does was brought in from his group home where he resides due to paraplegia from the C5-7 vertebrae.  The patient at this point has developed an acute pyelonephritis with dirty urine as well as a history of ESBL.  Patient will be started on meropenem for IV antibiotics.  Blood and urine cultures will be strictly followed.  Patient also has multiple wounds including 1 in the gluteal region that is a decubital ulcer as well as 1 on the left ankle area.  These at this point will need wound care as well as aggressive antibiotic management as the patient does have a history of ESBL and enterococcal infection.  The Enterococcus at this point will need to be treated aggressively Zyvox and for possible ESBL we will put the patient on meropenem.  We will follow cultures very closely if culture results come back and we are able to change antibiotics we will.  Patient will need chronic management of his medical issues otherwise.  We will continue at this point with nutritional support, monitoring of his atrial fibrillation, he does have a Watchman procedure done in the past controlling the atrial fibrillation.  The patient does not need chronic anticoagulation.  We will monitor electrolytes as well as blood counts given the fact that the patient will be on several aggressive antibiotic management therapies.  2/27/2023-patient at this point has improved considerably.  He has been afebrile overnight.  Vital signs at this point " "are stable.  We are at this point awaiting culture results from blood, urine, wounds.  Wound care is to see the patient and continue at this point with wound dressing changes.  Images will need to be uploaded into the chart on a continuous basis.  Patient's overall condition is improving.  Since the patient has a history of ESBL as well as enterococcal infection we await those results.  Once those results come back antibiotic management will need to be stratified.\"    Patient was admitted and initially started on linezolid and meropenem for suspected urinary tract infection and history of multidrug-resistant organisms.  Urine cultures were taken on admission, but growth appears to be only contaminants.  However, patient's fevers did improve, symptoms resolved.  Given patient's complicated history to discuss case with infectious disease on-call who recommended CT of chest and abdomen, which was negative for acute findings.  He was treated with 5 days total course of antibiotics for suspected UTI, and symptoms had completely resolved, patient returned to baseline.  Recommended close follow-up in the outpatient setting as patient is at risk for further infections given underlying comorbidities.  Additionally he also received wound care while here in the hospital and will return to home health and outpatient wound care.    Therefore, he is discharged in good and stable condition to home with organized home healthcare and close outpatient follow-up.    The patient met 2-midnight criteria for an inpatient stay at the time of discharge.    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 *      FOLLOW UP  Future Appointments   Date Time Provider Department Center   3/3/2023  2:00 PM GREGG Duenas [facility] [address] [place] [address]   3/10/2023  2:00 PM Steve Hodges M.D. [facility] 2nd .   3/24/2023  2:00 PM Steve Hodges M.D. [facility] 2[facility] .   3/31/2023  2:00 PM GREGG Duenas [facility] [address]     No follow-up provider specified.    MEDICATIONS ON DISCHARGE     Medication List        CONTINUE taking these medications        Instructions   amLODIPine 10 MG Tabs  Commonly known as: NORVASC   Take 1 Tablet by mouth every morning. Hold if BP <100/64.  Dose: 10 mg     aspirin 81 MG EC tablet   Take 1 Tablet by mouth every day.  Dose: 81 mg     baclofen 20 MG tablet  Commonly known as: LIORESAL   Take 1 Tablet by mouth 4 times a day.  Dose: 20 mg     docusate sodium 100 MG Caps  Commonly known as: COLACE   Take 200 mg by mouth every day.  Dose: 200 mg     ferrous sulfate 325 (65 Fe) MG tablet   Take 1 Tablet by mouth 2 times a day.  Dose: 325 mg     losartan 50 MG Tabs  Commonly known as: COZAAR   Take 1 Tablet by mouth at bedtime. Hold if BP <100/64.  Dose: 50 mg     Magnesium 400 MG Tabs   Take 400 mg by mouth every morning.  Dose: 400 mg     melatonin 5 mg Tabs   Take 10 mg by mouth at bedtime.  Dose: 10 mg     polyethylene glycol/lytes 17 g Pack  Commonly known as: MIRALAX   Take 17 g by mouth every day. Indications: Constipation  Dose: 17 g     PROBIOTIC PO   Take 1 Capsule by mouth every morning.  Dose: 1 Capsule     sennosides 8.6 MG Tabs  Commonly known as: SENOKOT   Take 17.2 mg by mouth 2 times a day.  Dose: 17.2 mg     Vitamin C 1000 MG Tabs   Take 1 Tablet by mouth every morning.  Dose: 1,000  "mg     Vitamin D3 2000 UNIT Caps   Take 1 Capsule by mouth every morning.  Dose: 2,000 Units     Zinc 50 MG Tabs   Take 1 Tablet by mouth every morning.  Dose: 50 mg              Allergies  Allergies   Allergen Reactions    Sulfa Drugs Rash     Rash, developed this back in 2015 after being placed on \"sulfa antibiotic for my wound\". Antibiotic was stopped and rash went away. Patient states he had a sulfa antibiotic prior to that time back when he was younger w/o a reaction.          DIET  Orders Placed This Encounter   Procedures    Diet Order Diet: Regular     Standing Status:   Standing     Number of Occurrences:   1     Order Specific Question:   Diet:     Answer:   Regular [1]       ACTIVITY  As tolerated.  Weight bearing as tolerated    CONSULTATIONS  None    PROCEDURES  None    LABORATORY  Lab Results   Component Value Date    SODIUM 137 03/02/2023    POTASSIUM 4.7 03/02/2023    CHLORIDE 108 03/02/2023    CO2 18 (L) 03/02/2023    GLUCOSE 80 03/02/2023    BUN 19 03/02/2023    CREATININE 0.55 03/02/2023        Lab Results   Component Value Date    WBC 5.7 03/02/2023    HEMOGLOBIN 12.9 (L) 03/02/2023    HEMATOCRIT 39.1 (L) 03/02/2023    PLATELETCT 135 (L) 03/02/2023        Total time of the discharge process exceeds 45 minutes.  "

## 2023-03-02 NOTE — DISCHARGE PLANNING
DC Transport Scheduled    Received request at: 3/2/2023 at 1121    Transport Company Scheduled:  [employer]  Spoke with Danuta at [facility] to schedule transport.    Scheduled Date: 3/2/2023  Scheduled Time: 1530    Destination: [facility] at [address] [facility] [address]     Notified care team of scheduled transport via Voalte.     If there are any changes needed to the DC transportation scheduled, please contact [facility] at ext. [phone] between the hours of 0227-2119 Mon-Fri. If outside those hours, contact the ED Case Manager at ext. [phone].

## 2023-03-02 NOTE — DISCHARGE PLANNING
Case Management Discharge Planning    Admission Date: 2/26/2023  GMLOS: 3.8  ALOS: 4    6-Clicks ADL Score: 12  6-Clicks Mobility Score: 6  PT and/or OT Eval ordered: NA  Post-acute Referrals Ordered: NA  Post-acute Choice Obtained: NA  Has referral(s) been sent to post-acute provider:  BLANKA      Anticipated Discharge Dispo: Discharge Disposition: Discharged to home/self care (01)    DME Needed: No    Action(s) Taken: Updated Provider/Nurse on Discharge Plan    Anticipate patient will return to [facility].  ZAIRA JARVIS requested HH order be placed by Ginette HENNING.     0948: Choice for [facility] faxed to [facility].    1113: RN CM confirmed address for [facility] as that listed on patient face sheet. RN CM faxed transportation communication form to ride line.  RN CM received call from Neel with [facility] wound care who is requesting new wound referral so she may schedule patient.  Ginette HENNING notified.  ZAIRA JARVIS received call from Za with [facility] who states they have accepted patient.      1230: Transportation back to [facility] confirmed for today at 1530.  MD, RN, and CNA notified.     Escalations Completed: None    Medically Clear: Yes    Next Steps: DC back to [facility] at 1530 with [facility] on board.     Barriers to Discharge: None   Topical Clindamycin Pregnancy And Lactation Text: This medication is Pregnancy Category B and is considered safe during pregnancy. It is unknown if it is excreted in breast milk.

## 2023-03-02 NOTE — DISCHARGE PLANNING
Received Choice form at 0937  Agency/Facility Name: [facility]  Referral sent per Choice form @ 5106

## 2023-03-02 NOTE — PROGRESS NOTES
Rn notified pt that [facility] will be picking up pt at 1530. RN offered to change all pt's dressings, pt declined and said he would prefer wound care to change the dressings tomorrow. Student ZAIRA Frausto present for interaction.    CNA verified with RN Case Manager that pt will be with established HH that he was with previous to this admission.

## 2023-03-02 NOTE — CARE PLAN
The patient is Watcher - Medium risk of patient condition declining or worsening    Shift Goals  Clinical Goals: transition to oral antibiotics, placement  Patient Goals: rest  Family Goals: n/a    Progress made toward(s) clinical / shift goals:  good urine output, vitals stable    Patient is not progressing towards the following goals: Pt declined repositioning until midnight

## 2023-03-02 NOTE — CARE PLAN
The patient is Stable - Low risk of patient condition declining or worsening    Shift Goals  Clinical Goals: Complete one dose of IV antibiotic, discharge home today if transport is available  Patient Goals: Go home  Family Goals: n/a    Progress made toward(s) clinical / shift goals:  Progressing    Patient is not progressing towards the following goals:

## 2023-03-03 ENCOUNTER — OFFICE VISIT (OUTPATIENT)
Dept: WOUND CARE | Facility: MEDICAL CENTER | Age: 73
End: 2023-03-03
Attending: STUDENT IN AN ORGANIZED HEALTH CARE EDUCATION/TRAINING PROGRAM
Payer: MEDICARE

## 2023-03-03 VITALS
HEART RATE: 72 BPM | DIASTOLIC BLOOD PRESSURE: 86 MMHG | RESPIRATION RATE: 20 BRPM | TEMPERATURE: 97 F | OXYGEN SATURATION: 94 % | SYSTOLIC BLOOD PRESSURE: 141 MMHG

## 2023-03-03 DIAGNOSIS — L89.890 PRESSURE INJURY OF LEFT LEG, UNSTAGEABLE (HCC): ICD-10-CM

## 2023-03-03 DIAGNOSIS — L89.154 SACRAL DECUBITUS ULCER, STAGE IV (HCC): Primary | ICD-10-CM

## 2023-03-03 DIAGNOSIS — L89.623 PRESSURE INJURY OF LEFT HEEL, STAGE 3 (HCC): ICD-10-CM

## 2023-03-03 DIAGNOSIS — T81.31XD POSTOPERATIVE WOUND DEHISCENCE, SUBSEQUENT ENCOUNTER: ICD-10-CM

## 2023-03-03 DIAGNOSIS — T14.8XXA DEEP TISSUE INJURY: ICD-10-CM

## 2023-03-03 PROCEDURE — 11043 DBRDMT MUSC&/FSCA 1ST 20/<: CPT

## 2023-03-03 PROCEDURE — 99213 OFFICE O/P EST LOW 20 MIN: CPT | Mod: 25 | Performed by: NURSE PRACTITIONER

## 2023-03-03 PROCEDURE — 99213 OFFICE O/P EST LOW 20 MIN: CPT | Mod: 25

## 2023-03-03 PROCEDURE — 11043 DBRDMT MUSC&/FSCA 1ST 20/<: CPT | Performed by: NURSE PRACTITIONER

## 2023-03-03 ASSESSMENT — ENCOUNTER SYMPTOMS
SHORTNESS OF BREATH: 0
FEVER: 0
DIARRHEA: 0
VOMITING: 0
CHILLS: 0
COUGH: 0
CONSTIPATION: 0
NAUSEA: 0

## 2023-03-03 NOTE — PROGRESS NOTES
Home health orders faxed to [facility] at fax #[phone].

## 2023-03-03 NOTE — PATIENT INSTRUCTIONS
-Keep dressings clean and dry. Change dressings every 3-4 days, and if they become over saturated, soiled or fall off.     -Avoid prolonged standing or sitting without elevating your legs.    -Remove your compression garments if you have severe pain, severe swelling, numbness, color change, or temperature change in your toes. If you need to remove your compression garments, do so by unrolling them. Do not cut the compression garments off, this is to prevent cutting yourself on accident.    -Should you experience any significant changes in your wound(s), such as signs of infection (increasing redness, swelling, localized heat, increased pain, fever > 101 F, chills) or have any questions regarding your home care instructions, please contact the wound center at [phone]. If after hours, contact your primary care physician or go to the hospital emergency room.     -If you are 5 or more minutes late for an appointment, we reserve the right to cancel and reschedule that appointment. Additionally, if you are habitually late or not showing (3 late cancellations and/or no shows), we reserve the right to cancel your remaining appointments and it will be your responsibility to obtain a new referral if services are still needed.

## 2023-03-04 NOTE — PROGRESS NOTES
Provider Encounter- Pressure Injury        HISTORY OF PRESENT ILLNESS  Wound History:    START OF CARE IN CLINIC: 3/3/2023 (return to clinic after hospitalization)    REFERRING PROVIDER: Sahara Mendez      WOUNDS- Pressure Injury, Sacrococcygeal, stage IV                                                             Surgical wound dehiscence, left medial lower leg-status post surgical I&D of stage IV pressure injury and           biologic application                    Pressure injury, DTI, left posterior lower leg-first observed in clinic 7/29/2022       Pressure injury stage II L heel - first noted 10/21     Right 2nd toe avulsion - first noted 10/21     Skin tag left posterior ear - first noted 10/21     HISTORY: Patient with history of incomplete quadriplegia referred to [facility] for treatment of a stage IV pressure injury.  He has a history of previous pressure injuries to this area, and underwent muscle flaps in 2019, and then again in 2020.  He was seen in the wound clinic in November 2021 for an ulcer proximal from his current ulcer, and pressure injuries to his left posterior lower leg and left heel.  At that time, it was discovered that the patient had retained VAC foam embedded in the wound bed of the sacral wound.  Attempts were made to get him back to his plastic surgeon, though unsuccessful.  In January he underwent surgical removal of VAC sponge along with excisional debridement of his sacral wound by Dr. Chaves.  After the surgery, his wound went on to heal without incident.   In early April 2022, his home health nurse noted a new sacral ulcer, below the previous ulcer which quickly tripled in size over the following weeks.  The ulcer to his left medial lower leg had also deteriorated, with bone visible at the base..  He was hospitalized from 4/22 until 4/27/2022 and underwent surgery with Dr. Raman on 4/26 for irrigation and debridement of multiple compartments of the left lower extremity, bone excision,  and complex closure of chronic wound using biologic skin substitute.   His sacrococcygeal wound was not surgically addressed during this admission.  He was discharged back to his group home, with home health, and referral to outpatient wound clinic for his sacral wound.  He was instructed to follow-up with his surgeon for his lower leg wound.       Postoperatively, the left medial lower leg incision dehisced.  He was seen by his surgeon at [facility] on 5/11.  The surgeon opted to leave remaining sutures in place, and refer him to the wound clinic for treatment of this wound.   Treatment of this wound was initiated in clinic on 5/12.  During this visit was also noted that his heel DTI had resolved, but that he had a new pressure injury to his left posterior lower extremity.     A new pressure injury was noted to patient's right upper buttock/lower back on 5/20/2022.  Wound was linear in shape, skin discolored but intact.     Abrasion noted to left anterior lower leg.  First observed in clinic on 7/22/2022.  Patient states he bumped his leg into his food tray.     Small DTI noted to patient's left lateral lower leg on 7/29/2022.  Skin intact but discolored.     Large area of deep tissue injury noted to patient's left exterior lower leg.  Patient denied any trauma to this area.  Skin intact.  Wound documented.    1/27/2023: Patient was admitted to [facility] from 1/23-1/25/2023 with gross hematuria. He underwent RICHARD which showed watchman device was in place and he was taken off of Xarelto. While hospitalized wound team was consulted. He was referred back to [facility] and home health upon discharge.    Patient was hospitalized at [facility] for pyelonephritis from 2/26 until 3/2/2023, admitted for fever and general malaise.  He was admitted and initially started on linezolid and meropenem for suspected UTI and history of multidrug-resistant organisms.  Urine cultures were negative. ID was consulted, recommended CT of chest and abdomen,which  "were negative for acute findings. However, he was treated with 5 days total course of antibiotics for suspected UTI, and symptoms completely resolved.  During this admission, the inpatient wound team was consulted for treatment of his sacral and lower leg wounds.  A wound culture was taken from his left heel pressure ulcer, negative.  Once stabilized, he was discharged home and referred back to [facility] to resume treatment of his wounds.    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  Never used smokeless tobacco    Patient's problem list, allergies, and current medications reviewed and updated in Epic    Interval History:  Interval History thinned 7/29/2022.  Please see previous notes for complete interval history.     Interval History thinned 1/27/2023. Please see previous note for complete interval history.    Interval History thinned 3/3/2023.  Please see previous notes for complete interval history.      3/3/2023 : Clinic visit with ADILSON Arthur, DAT-BC, ALEXN, CFTRACI.   Patient returns to clinic after hospitalization for UTI.  He states that overall he is feeling well, denies fevers, chills, nausea, vomiting, cough or shortness of breath.    Per last wound clinic note, a loose bone fragment was extracted from the wound bed of his left medial leg wound and sent for pathology.  Histology report described, \"extensively degenerated fibrocartilaginous tissue\", suggestive of adjacent osteomyelitis.  X-ray of tib-fib ordered to assess for OM.     REVIEW OF SYSTEMS:   Review of Systems   Constitutional:  Negative for chills, fever and malaise/fatigue.   Respiratory:  Negative for cough and " shortness of breath.    Cardiovascular:  Negative for chest pain.   Gastrointestinal:  Negative for constipation, diarrhea, nausea and vomiting.   Neurological:         Paraplegia more than 20 years        PHYSICAL EXAMINATION:   BP (!) 141/86 Comment: RN notified  Pulse 72   Temp 36.1 °C (97 °F) (Temporal)   Resp 20   SpO2 94%   Physical Exam  Constitutional:       Appearance: He is obese.   Cardiovascular:      Rate and Rhythm: Normal rate.   Pulmonary:      Effort: Pulmonary effort is normal.   Abdominal:      Comments: Colostomy left lower quadrant   Genitourinary:     Comments: Suprapubic catheter  Skin:     Comments: Stage IV coccygeal pressure injury -and area has decreased slightly since last assessment, depth about the same, bone is not visible, depth of tracts slowly decreasing    Full-thickness wound to left medial lower leg -tissue friable, dark red/maroon in color, periwound also dark red/maroon, moderate amount of sanguinous drainage, no odor    Stage III pressure injury left posterior heel - Appears resolved covered with thin, fragile epithelium, no drainage    Refer to wound flowsheet and photos   Neurological:      Mental Status: He is alert and oriented to person, place, and time.   Psychiatric:         Mood and Affect: Mood normal.       WOUND ASSESSMENT  Wound 11/11/22 LLE Medial anterior (Active)   Wound Image    03/03/23 1500   Site Assessment Red;Boggy;Fragile;Purple 03/03/23 1500   Periwound Assessment Purple;Fragile;Scar tissue 03/03/23 1500   Margins Unattached edges 03/03/23 1500   Drainage Amount Moderate 03/03/23 1500   Drainage Description Serosanguineous 03/03/23 1500   Treatments Cleansed;Provider debridement 03/03/23 1500   Wound Cleansing Normal Saline Irrigation 03/03/23 1500   Periwound Protectant Skin Protectant Wipes to Periwound;Barrier Paste 03/03/23 1500   Dressing Cleansing/Solutions Not Applicable 03/03/23 1500   Dressing Options Hydrofiber Silver Strip;Hydrofiber  Silver;Silicone Adhesive Foam;Tubigrip 03/03/23 1500   Dressing Changed Changed 02/17/23 1500   Dressing Change/Treatment Frequency Monday, Wednesday, Friday, and As Needed 03/03/23 1500   Non-staged Wound Description Full thickness 03/03/23 1500   Wound Length (cm) 0.5 cm 03/03/23 1500   Wound Width (cm) 0.5 cm 03/03/23 1500   Wound Depth (cm) 0.5 cm 03/03/23 1500   Wound Surface Area (cm^2) 0.25 cm^2 03/03/23 1500   Wound Volume (cm^3) 0.125 cm^3 03/03/23 1500   Post-Procedure Length (cm) 0.5 cm 03/03/23 1500   Post-Procedure Width (cm) 0.6 cm 03/03/23 1500   Post-Procedure Depth (cm) 0.5 cm 03/03/23 1500   Post-Procedure Surface Area (cm^2) 0.3 cm^2 03/03/23 1500   Post-Procedure Volume (cm^3) 0.15 cm^3 03/03/23 1500   Wound Healing % 81 03/03/23 1500   Tunneling (cm) 0 cm 03/03/23 1500   Tunneling Clock Position of Wound 9 11/11/22 1300   Undermining (cm) 0 cm 03/03/23 1500   Wound Odor None 03/03/23 1500   Exposed Structures Bone 03/03/23 1500   Number of days: 112       Wound 02/27/23 Pressure Injury Coccyx Stage IV (Active)   Wound Image    03/03/23 1500   Site Assessment Pink;Red 03/03/23 1500   Periwound Assessment Fragile 03/03/23 1500   Margins Unattached edges 03/03/23 1500   Drainage Amount Moderate 03/03/23 1500   Drainage Description Serosanguineous 03/03/23 1500   Treatments Cleansed;Provider debridement 03/03/23 1500   Wound Cleansing Normal Saline Irrigation 03/03/23 1500   Periwound Protectant Skin Protectant Wipes to Periwound;Barrier Paste 03/03/23 1500   Dressing Cleansing/Solutions Not Applicable 03/03/23 1500   Dressing Options Hydrofiber Silver Strip;Hydrofiber Silver;Other (Comments) 03/03/23 1500   Dressing Change/Treatment Frequency Monday, Wednesday, Friday, and As Needed 03/03/23 1500   WOUND NURSE ONLY - Pressure Injury Stage 4 03/03/23 1500   Wound Length (cm) 1.5 cm 03/03/23 1500   Wound Width (cm) 1.2 cm 03/03/23 1500   Wound Depth (cm) 1.4 cm 03/03/23 1500   Wound Surface Area  (cm^2) 1.8 cm^2 03/03/23 1500   Wound Volume (cm^3) 2.52 cm^3 03/03/23 1500   Post-Procedure Length (cm) 1.5 cm 03/03/23 1500   Post-Procedure Width (cm) 1.4 cm 03/03/23 1500   Post-Procedure Depth (cm) 1.4 cm 03/03/23 1500   Post-Procedure Surface Area (cm^2) 2.1 cm^2 03/03/23 1500   Post-Procedure Volume (cm^3) 2.94 cm^3 03/03/23 1500   Wound Healing % 31 03/03/23 1500   Tunneling (cm) 1.1 cm 03/03/23 1500   Tunneling Clock Position of Wound 12 03/03/23 1500   Undermining (cm) 0 cm 03/03/23 1500   Wound Odor None 03/03/23 1500   Exposed Structures Other (Comments) 03/03/23 1500   Number of days: 4       Wound 02/27/23 Heel Posterior Left (Active)   Wound Image   03/03/23 1500   Site Assessment Red;Pink;Yellow 03/03/23 1500   Periwound Assessment Blanchable erythema 03/03/23 1500   Margins Attached edges 03/03/23 1500   Drainage Amount Moderate 03/03/23 1500   Drainage Description Serosanguineous 03/03/23 1500   Treatments Cleansed;Site care 03/03/23 1500   Wound Cleansing Normal Saline Irrigation 03/03/23 1500   Periwound Protectant Skin Protectant Wipes to Periwound;Barrier Paste 03/03/23 1500   Dressing Cleansing/Solutions Not Applicable 03/03/23 1500   Dressing Options Hydrofiber Silver;Other (Comments);Tubigrip 03/03/23 1500   Dressing Change/Treatment Frequency Monday, Wednesday, Friday, and As Needed 03/03/23 1500   WOUND NURSE ONLY - Pressure Injury Stage 3 03/03/23 1500   Wound Length (cm) 1.4 cm 03/03/23 1500   Wound Width (cm) 1.5 cm 03/03/23 1500   Wound Depth (cm) 0.1 cm 03/03/23 1500   Wound Surface Area (cm^2) 2.1 cm^2 03/03/23 1500   Wound Volume (cm^3) 0.21 cm^3 03/03/23 1500   Post-Procedure Length (cm) 1.4 cm 03/03/23 1500   Post-Procedure Width (cm) 1.5 cm 03/03/23 1500   Post-Procedure Depth (cm) 0.1 cm 03/03/23 1500   Post-Procedure Surface Area (cm^2) 2.1 cm^2 03/03/23 1500   Post-Procedure Volume (cm^3) 0.21 cm^3 03/03/23 1500   Wound Healing % 79 03/03/23 1500   Tunneling (cm) 0 cm 03/03/23  1500   Undermining (cm) 0 cm 03/03/23 1500   Wound Odor None 03/03/23 1500   Exposed Structures None 03/03/23 1500   Number of days: 4       Wound 03/03/23 Full Thickness Wound Leg LLE medial posterior (Active)   Wound Image    03/03/23 1500   Site Assessment Red;Purple;Boggy 03/03/23 1500   Periwound Assessment Purple;Scar tissue 03/03/23 1500   Margins Unattached edges 03/03/23 1500   Drainage Amount Moderate 03/03/23 1500   Drainage Description Serosanguineous 03/03/23 1500   Treatments Cleansed;Provider debridement;Site care 03/03/23 1500   Wound Cleansing Normal Saline Irrigation 03/03/23 1500   Periwound Protectant Skin Protectant Wipes to Periwound;Barrier Paste 03/03/23 1500   Dressing Cleansing/Solutions Not Applicable 03/03/23 1500   Dressing Options Hydrofiber Silver Strip;Hydrofiber Silver;Silicone Adhesive Foam;Tubigrip 03/03/23 1500   Dressing Change/Treatment Frequency Monday, Wednesday, Friday, and As Needed 03/03/23 1500   Non-staged Wound Description Full thickness 03/03/23 1500   Wound Length (cm) 1.5 cm 03/03/23 1500   Wound Width (cm) 0.5 cm 03/03/23 1500   Wound Depth (cm) 0.4 cm 03/03/23 1500   Wound Surface Area (cm^2) 0.75 cm^2 03/03/23 1500   Wound Volume (cm^3) 0.3 cm^3 03/03/23 1500   Post-Procedure Length (cm) 1.5 cm 03/03/23 1500   Post-Procedure Width (cm) 0.6 cm 03/03/23 1500   Post-Procedure Depth (cm) 0.4 cm 03/03/23 1500   Post-Procedure Surface Area (cm^2) 0.9 cm^2 03/03/23 1500   Post-Procedure Volume (cm^3) 0.36 cm^3 03/03/23 1500   Tunneling (cm) 0 cm 03/03/23 1500   Undermining (cm) 0 cm 03/03/23 1500   Wound Odor None 03/03/23 1500   Exposed Structures None 03/03/23 1500   Number of days: 0       PROCEDURE:  Debridement of sacral wound and left medial lower extremity wound dehiscence.  -Lidocaine declined, he is insensate  -Curette used to debride wound beds, forceps used to debride bone fragment from left medial lower extremity wound.  Excisional debridement was performed to  remove devitalized tissue until healthy, bleeding tissue was visualized.  Total area debrided approximately 7.5 cm², including into undermined area.  Tissue debrided into the muscle layer of both wounds.  -Bleeding controlled with manual pressure.   -Wound care completed by wound RN, refer to flowsheet  -Patient tolerated the procedure well, without c/o pain or discomfort.    BIOLOGIC LOG  5/20/2022-first application.  PRODUCT: EpiCord 2 x 3 cm . PRODUCT #[id number]; EXPIRES: 2026-12-01 5/27/2022- second application.  PRODUCT: EpiCordEX 2 x 3 cm . PRODUCT #[id number]; EXPIRES: 2026-09-01    6/3/2022- third application.  PRODUCT: EpiCordEX 2 x 3 cm . PRODUCT #[id number]; EXPIRES: 2026-10-01    6/10/2022- fourth application.  PRODUCT: EpiCordEX 2 x 3 cm . PRODUCT #[id number]; EXPIRES: 2026-09-01 6/17/2022- fifth application.  PRODUCT: EpiCordEX 2 x 3 cm . PRODUCT #[id number]; EXPIRES: 2027-02-01 6/24/2022- sixth application.  PRODUCT: EpiCordEX 2 x 3 cm . PRODUCT #[id number]; EXPIRES: 2027-02-01 07/01/22: Seventh application.  Product: Epicort Dx 2.0 x 3.0 cm reorder number [id number]; product number [id number]; expires 2027-02-01 7/22/2022- eighth application.  PRODUCT: EpiCord 2 x 3 cm, reorder #[id number]. PRODUCT #[id number]; EXPIRES: 2027-02-01      Pertinent Labs and Diagnostics:    Labs:     A1c: No results found for: HBA1C     [facility] results, 7/1/2022 (under media tab)    CRP 13    ESR 31      IMAGING:     10/3/2022-CT of pelvis with contrast  IMPRESSION:     1.  Posterior soft tissue sacral wound and 1.5 x 3.7 cm abscess.   2.  Progressive destruction of the distal sacrum and proximal coccyx. Sclerosis and irregularity of the distal sacrum consistent with osteomyelitis.   3.  Old wound within the soft tissues posterior to the distal left SI joint with possible fistulous communication.    10/3/2022-CT of tib-fib with and without contrast,  with reconstruction  IMPRESSION:     1.  Old fractures of the left tibia and fibula with extensive callus formation and bony bridging as well as extensive dystrophic calcifications. Similar to previous.   2.  Distal medial soft tissue wounds with ill-defined superficial in the soft tissue thickening and fluid collections suggestive of phlegmon. No organized abscess identified.   3.  No definite osteomyelitis.   4.  Arthritic changes.        VASCULAR STUDIES: No results found.    LAST  WOUND CULTURE:   Lab Results   Component Value Date/Time    CULTRSULT Light growth mixed enteric ade. 02/26/2023 10:29 PM      PATHOLOGY  2/17/2023-bone fragment extracted from left lower extremity wound\\  FINAL DIAGNOSIS:     A. Left leg bone fragment at base of chronic wound:          Extensively degenerated fibrocartilaginous tissue with a rim of           fibrinopurulent debris          Correlate with culture findings            Comment: While no residual intact bone is identified, these           findings are suggestive of adjacent osteomyelitis.      ASSESSMENT AND PLAN:     1. Sacral decubitus ulcer, stage IV ([facility])  Comments: Ulcer first noted in early April 2022 as small open area which quickly enlarged.  This ulcer is present distal from previous sacral ulcer which healed after surgery in January.  Patient has history of flap reconstruction x2 to this area.  CT shows progressive destruction of distal sacrum and proximal coccyx.    3/3/2023: Total wound area has decreased since last assessment, bone covered with thin layer of granulation.  - Excisional debridement was performed in clinic, medically necessary to promote wound healing.  -Patient is to return to clinic weekly for assessment and debridement   -Home health to see patient 1-2 times per week in between clinic visits  - Patient does not currently have follow up with Dr. Saini. If wound deteriorates or fails to improve can consider re-establishing with Dr. Saini  for evaluation for coverage options.  -Patient to continue offloading sacral area as much as possible  -Roho cushion in wheelchair     Wound care: Hydrofiber silver strip, hydrofiber silver, Mepilex    2. Postoperative wound dehiscence, subsequent encounter  Comments: On 4/26 patient underwent irrigation and debridement of multiple compartments of the left lower extremity states for pressure injury, with bone excision, and complex closure of chronic wound using biologic skin substitute.  During postop visit in surgeons office on 5/11, surgical site was noted to be dehisced.  Patient was referred to wound clinic for management.    3/3/2023: Bone fragment debrided from wound bed last clinic visit analyzed by pathology, see above.  Findings suggestive of osteomyelitis  - Excisional debridement was performed in clinic, medically necessary to promote wound healing.   - X-ray ordered to assess for OM.  - May need referral to ID to discuss repeating course of Abx therapy for OM  -Patient to return to clinic weekly for assessment and debridement as needed  -Home health to change dressing 1-2 times per week in between clinic visits   -Patient to follow-up with Dr. Raman at [facility]    Wound care: Hydrofera Blue, Mepilex    3. Deep tissue injury  4. Pressure injury of left leg, unstageable ([facility]CC)  Comments: DTI to posterior left lower leg first observed in clinic 7/29/2022.  Patient does not know how it started, had been wearing heel float boot consistently.    3/3/2023: Remains stable, skin intact but discolored  -Continue to monitor for any deterioration in tissue quality  -Patient is currently wearing Prevalon boots to help prevent pressure injuries to bilateral lower extremities    Wound care: Mepilex, Tubigrip D    5. Pressure injury of left heel, stage 3 (HCC)  Comments: First noted in clinic on 10/21/2022, originally noted to be stage II    3/3/2023: Appears to be resolved, covered with thin layer of epithelium.  - No  debridement required  - Patient reports wearing Prevalon boots 24 hours a day. He is clear having pressure to heel and recommend offloading heels off mattress.  -Monitor each clinic visit.  High risk for recurrence     Wound Care: Heel Mepilex to reduce pressure and friction    6. Quadriplegia, C5-C7, complete ([facility])  Comments: Complicating factor.  Impaired mobility and sensation  -Patient is still spending 7-8 hours/day up in his wheelchair, though he does have appropriate offloading cushion, and knows to reposition frequently.  Wears heel float boots bilaterally at all times    My total time spent caring for the patient on the day of the encounter was 30 minutes.   This does not include time spent on separately billable procedures/tests.       Please note that this note may have been created using voice recognition software. I have worked with technical experts from [facility] to optimize the interface.  I have made every reasonable attempt to correct obvious errors, but there may be errors of grammar and possibly content that I did not discover before finalizing the note.   yes

## 2023-03-10 ENCOUNTER — OFFICE VISIT (OUTPATIENT)
Dept: WOUND CARE | Facility: MEDICAL CENTER | Age: 73
End: 2023-03-10
Attending: STUDENT IN AN ORGANIZED HEALTH CARE EDUCATION/TRAINING PROGRAM
Payer: MEDICARE

## 2023-03-10 VITALS
DIASTOLIC BLOOD PRESSURE: 61 MMHG | HEART RATE: 56 BPM | OXYGEN SATURATION: 94 % | RESPIRATION RATE: 20 BRPM | SYSTOLIC BLOOD PRESSURE: 95 MMHG | TEMPERATURE: 97.9 F

## 2023-03-10 DIAGNOSIS — G82.54 QUADRIPLEGIA, C5-C7, INCOMPLETE (HCC): ICD-10-CM

## 2023-03-10 DIAGNOSIS — L89.890 PRESSURE INJURY OF LEFT LEG, UNSTAGEABLE (HCC): ICD-10-CM

## 2023-03-10 DIAGNOSIS — T81.31XD POSTOPERATIVE WOUND DEHISCENCE, SUBSEQUENT ENCOUNTER: ICD-10-CM

## 2023-03-10 DIAGNOSIS — T14.8XXA DEEP TISSUE INJURY: ICD-10-CM

## 2023-03-10 DIAGNOSIS — L89.154 SACRAL DECUBITUS ULCER, STAGE IV (HCC): ICD-10-CM

## 2023-03-10 DIAGNOSIS — L89.623 PRESSURE INJURY OF LEFT HEEL, STAGE 3 (HCC): ICD-10-CM

## 2023-03-10 PROCEDURE — 11043 DBRDMT MUSC&/FSCA 1ST 20/<: CPT

## 2023-03-10 PROCEDURE — 11042 DBRDMT SUBQ TIS 1ST 20SQCM/<: CPT

## 2023-03-10 PROCEDURE — 11043 DBRDMT MUSC&/FSCA 1ST 20/<: CPT | Performed by: STUDENT IN AN ORGANIZED HEALTH CARE EDUCATION/TRAINING PROGRAM

## 2023-03-10 PROCEDURE — 11042 DBRDMT SUBQ TIS 1ST 20SQCM/<: CPT | Mod: 59 | Performed by: STUDENT IN AN ORGANIZED HEALTH CARE EDUCATION/TRAINING PROGRAM

## 2023-03-10 NOTE — PROGRESS NOTES
Provider Encounter- Pressure Injury        HISTORY OF PRESENT ILLNESS  Wound History:    START OF CARE IN CLINIC: 3/3/2023 (return to clinic after hospitalization)    REFERRING PROVIDER: Sahara Mendez      WOUNDS- Pressure Injury, Sacrococcygeal, stage IV                                                             Surgical wound dehiscence, left medial lower leg-status post surgical I&D of stage IV pressure injury and           biologic application                    Pressure injury, DTI, left posterior lower leg-first observed in clinic 7/29/2022       Pressure injury stage III L heel - first noted 10/21     Right 2nd toe avulsion - first noted 10/21     Skin tag left posterior ear - first noted 10/21     HISTORY: Patient with history of incomplete quadriplegia referred to [facility] for treatment of a stage IV pressure injury.  He has a history of previous pressure injuries to this area, and underwent muscle flaps in 2019, and then again in 2020.  He was seen in the wound clinic in November 2021 for an ulcer proximal from his current ulcer, and pressure injuries to his left posterior lower leg and left heel.  At that time, it was discovered that the patient had retained VAC foam embedded in the wound bed of the sacral wound.  Attempts were made to get him back to his plastic surgeon, though unsuccessful.  In January he underwent surgical removal of VAC sponge along with excisional debridement of his sacral wound by Dr. Chaves.  After the surgery, his wound went on to heal without incident.   In early April 2022, his home health nurse noted a new sacral ulcer, below the previous ulcer which quickly tripled in size over the following weeks.  The ulcer to his left medial lower leg had also deteriorated, with bone visible at the base..  He was hospitalized from 4/22 until 4/27/2022 and underwent surgery with Dr. Raman on 4/26 for irrigation and debridement of multiple compartments of the left lower extremity, bone  excision, and complex closure of chronic wound using biologic skin substitute.   His sacrococcygeal wound was not surgically addressed during this admission.  He was discharged back to his group home, with home health, and referral to outpatient wound clinic for his sacral wound.  He was instructed to follow-up with his surgeon for his lower leg wound.       Postoperatively, the left medial lower leg incision dehisced.  He was seen by his surgeon at [facility] on 5/11.  The surgeon opted to leave remaining sutures in place, and refer him to the wound clinic for treatment of this wound.   Treatment of this wound was initiated in clinic on 5/12.  During this visit was also noted that his heel DTI had resolved, but that he had a new pressure injury to his left posterior lower extremity.     A new pressure injury was noted to patient's right upper buttock/lower back on 5/20/2022.  Wound was linear in shape, skin discolored but intact.     Abrasion noted to left anterior lower leg.  First observed in clinic on 7/22/2022.  Patient states he bumped his leg into his food tray.     Small DTI noted to patient's left lateral lower leg on 7/29/2022.  Skin intact but discolored.     Large area of deep tissue injury noted to patient's left exterior lower leg.  Patient denied any trauma to this area.  Skin intact.  Wound documented.    1/27/2023: Patient was admitted to [facility] from 1/23-1/25/2023 with gross hematuria. He underwent RICHARD which showed watchman device was in place and he was taken off of Xarelto. While hospitalized wound team was consulted. He was referred back to [facility] and home health upon discharge.    Patient was hospitalized at [facility] for pyelonephritis from 2/26 until 3/2/2023, admitted for fever and general malaise.  He was admitted and initially started on linezolid and meropenem for suspected UTI and history of multidrug-resistant organisms.  Urine cultures were negative. ID was consulted, recommended CT of chest and  "abdomen,which were negative for acute findings. However, he was treated with 5 days total course of antibiotics for suspected UTI, and symptoms completely resolved.  During this admission, the inpatient wound team was consulted for treatment of his sacral and lower leg wounds.  A wound culture was taken from his left heel pressure ulcer, negative.  Once stabilized, he was discharged home and referred back to [facility] to resume treatment of his wounds.    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  Never used smokeless tobacco    Patient's problem list, allergies, and current medications reviewed and updated in Epic    Interval History:  Interval History thinned 7/29/2022.  Please see previous notes for complete interval history.     Interval History thinned 1/27/2023. Please see previous note for complete interval history.    Interval History thinned 3/3/2023.  Please see previous notes for complete interval history.      3/3/2023 : Clinic visit with ADILSON Arthur, FNPEGGY-BC, CWDINESHN, CFTRACI.   Patient returns to clinic after hospitalization for UTI.  He states that overall he is feeling well, denies fevers, chills, nausea, vomiting, cough or shortness of breath.    Per last wound clinic note, a loose bone fragment was extracted from the wound bed of his left medial leg wound and sent for pathology.  Histology report described, \"extensively degenerated fibrocartilaginous tissue\", suggestive of adjacent osteomyelitis.  X-ray of tib-fib ordered to assess for OM.    3/10/2023: Clinic visit with Steve Hodges MD. Patient reports feeling in normal state of health. He obtained Xray left tib-fib, " we do not have images, but report states no evidence of acute pathology such as OM. We discussed options for more aggressive treatment, patient would prefer to watch and wait. Left heel remains open since hospitalization despite using Prevalon boots, will prescribe Prafo boot for offloading. Sacrum measures slightly smaller, does not appear significantly changed.     REVIEW OF SYSTEMS:   Unchanged from previous wound clinic assessment on 3/3/2023, except as noted in interval history above         PHYSICAL EXAMINATION:   BP 95/61   Pulse (!) 56   Temp 36.6 °C (97.9 °F)   Resp 20   SpO2 94% Comment: RA  Physical Exam  Constitutional:       Appearance: He is obese.   Cardiovascular:      Rate and Rhythm: Normal rate.   Pulmonary:      Effort: Pulmonary effort is normal.   Abdominal:      Comments: Colostomy left lower quadrant   Genitourinary:     Comments: Suprapubic catheter  Skin:     Comments: Stage IV coccygeal pressure injury -and area has decreased slightly since last assessment, depth about the same, bone is not visible, depth of tracts slowly decreasing    Full-thickness wound to left medial lower leg - Unchanged, tissue friable, dark red/maroon in color, periwound also dark red/maroon, probes to bone, moderate amount of sanguinous drainage, no odor    Stage III pressure injury left posterior heel - Wound is open, superficial with thin layer of slough.    Refer to wound flowsheet and photos   Neurological:      Mental Status: He is alert and oriented to person, place, and time.   Psychiatric:         Mood and Affect: Mood normal.       WOUND ASSESSMENT  Wound 11/11/22 LLE Medial anterior (Active)   Wound Image   03/10/23 1400   Site Assessment Red;Boggy;Fragile;Purple 03/10/23 1400   Periwound Assessment Purple;Fragile;Scar tissue 03/10/23 1400   Margins Unattached edges 03/10/23 1400   Drainage Amount Moderate 03/10/23 1400   Drainage Description Serosanguineous 03/10/23 1400   Treatments Cleansed;Site  care 03/10/23 1400   Wound Cleansing Puracyn [place] 03/10/23 1400   Periwound Protectant Skin Protectant Wipes to Periwound;Barrier Paste 03/10/23 1400   Dressing Cleansing/Solutions Not Applicable 03/10/23 1400   Dressing Options Hydrofiber Silver Strip;Hydrofiber Silver;Mepilex Heel;Tubigrip 03/10/23 1400   Dressing Changed Changed 02/17/23 1500   Dressing Change/Treatment Frequency Monday, Wednesday, Friday, and As Needed 03/10/23 1400   Non-staged Wound Description Full thickness 03/10/23 1400   Wound Length (cm) 1.1 cm 03/10/23 1400   Wound Width (cm) 1.3 cm 03/10/23 1400   Wound Depth (cm) 0.5 cm 03/10/23 1400   Wound Surface Area (cm^2) 1.43 cm^2 03/10/23 1400   Wound Volume (cm^3) 0.715 cm^3 03/10/23 1400   Post-Procedure Length (cm) 1.1 cm 03/10/23 1400   Post-Procedure Width (cm) 1.3 cm 03/10/23 1400   Post-Procedure Depth (cm) 0.5 cm 03/10/23 1400   Post-Procedure Surface Area (cm^2) 1.43 cm^2 03/10/23 1400   Post-Procedure Volume (cm^3) 0.715 cm^3 03/10/23 1400   Wound Healing % -6 03/10/23 1400   Tunneling (cm) 0 cm 03/10/23 1400   Tunneling Clock Position of Wound 9 11/11/22 1300   Undermining (cm) 0 cm 03/10/23 1400   Wound Odor None 03/10/23 1400   Exposed Structures Bone 03/10/23 1400   Number of days: 119       Wound 02/27/23 Pressure Injury Coccyx Stage IV (Active)   Wound Image    03/10/23 1400   Site Assessment Pink;Red 03/10/23 1400   Periwound Assessment Fragile;Scar tissue 03/10/23 1400   Margins Unattached edges 03/10/23 1400   Drainage Amount Moderate 03/10/23 1400   Drainage Description Serosanguineous 03/10/23 1400   Treatments Cleansed;Provider debridement;Site care 03/10/23 1400   Wound Cleansing Puracyn [place] 03/10/23 1400   Periwound Protectant Skin Protectant Wipes to Periwound;Barrier Paste 03/10/23 1400   Dressing Cleansing/Solutions Not Applicable 03/10/23 1400   Dressing Options Hydrofiber Silver Strip;Hydrofiber Silver;Mepilex 03/10/23 1400   Dressing Change/Treatment Frequency  Monday, Wednesday, Friday, and As Needed 03/10/23 1400   WOUND NURSE ONLY - Pressure Injury Stage 4 03/10/23 1400   Wound Length (cm) 1.2 cm 03/10/23 1400   Wound Width (cm) 1.5 cm 03/10/23 1400   Wound Depth (cm) 1 cm 03/10/23 1400   Wound Surface Area (cm^2) 1.8 cm^2 03/10/23 1400   Wound Volume (cm^3) 1.8 cm^3 03/10/23 1400   Post-Procedure Length (cm) 1.3 cm 03/10/23 1400   Post-Procedure Width (cm) 1.5 cm 03/10/23 1400   Post-Procedure Depth (cm) 1.1 cm 03/10/23 1400   Post-Procedure Surface Area (cm^2) 1.95 cm^2 03/10/23 1400   Post-Procedure Volume (cm^3) 2.145 cm^3 03/10/23 1400   Wound Healing % 51 03/10/23 1400   Tunneling (cm) 1.5 cm 03/10/23 1400   Tunneling Clock Position of Wound 12 03/10/23 1400   Undermining (cm) 0 cm 03/10/23 1400   Wound Odor None 03/10/23 1400   Exposed Structures Other (Comments) 03/10/23 1400   Number of days: 11       Wound 02/27/23 Heel Posterior Left (Active)   Wound Image    03/10/23 1400   Site Assessment Red;[place] 03/10/23 1400   Periwound Assessment Blanchable erythema;Fragile 03/10/23 1400   Margins Attached edges 03/10/23 1400   Drainage Amount Moderate 03/10/23 1400   Drainage Description Serosanguineous 03/10/23 1400   Treatments Cleansed;Provider debridement;Site care 03/10/23 1400   Wound Cleansing Puracyn [place] 03/10/23 1400   Periwound Protectant Skin Protectant Wipes to Periwound;Barrier Paste 03/10/23 1400   Dressing Cleansing/Solutions Not Applicable 03/10/23 1400   Dressing Options Hydrofiber Silver;Mepilex Heel;Tubigrip 03/10/23 1400   Dressing Change/Treatment Frequency Monday, Wednesday, Friday, and As Needed 03/10/23 1400   WOUND NURSE ONLY - Pressure Injury Stage 3 03/10/23 1400   Wound Length (cm) 1.5 cm 03/10/23 1400   Wound Width (cm) 2.1 cm 03/10/23 1400   Wound Depth (cm) 0.2 cm 03/10/23 1400   Wound Surface Area (cm^2) 3.15 cm^2 03/10/23 1400   Wound Volume (cm^3) 0.63 cm^3 03/10/23 1400   Post-Procedure Length (cm) 1.5 cm 03/10/23 1400    Post-Procedure Width (cm) 2 cm 03/10/23 1400   Post-Procedure Depth (cm) 0.2 cm 03/10/23 1400   Post-Procedure Surface Area (cm^2) 3 cm^2 03/10/23 1400   Post-Procedure Volume (cm^3) 0.6 cm^3 03/10/23 1400   Wound Healing % 37 03/10/23 1400   Tunneling (cm) 0 cm 03/10/23 1400   Undermining (cm) 0 cm 03/10/23 1400   Wound Odor None 03/10/23 1400   Exposed Structures None 03/10/23 1400   Number of days: 11       Wound 03/03/23 Full Thickness Wound Leg LLE medial posterior (Active)   Wound Image    03/10/23 1400   Site Assessment Red;Purple;Boggy 03/10/23 1400   Periwound Assessment Purple;Scar tissue 03/10/23 1400   Margins Unattached edges 03/10/23 1400   Drainage Amount Moderate 03/10/23 1400   Drainage Description Serosanguineous 03/10/23 1400   Treatments Cleansed;Site care 03/10/23 1400   Wound Cleansing Puracyn [place] 03/10/23 1400   Periwound Protectant Skin Protectant Wipes to Periwound;Barrier Paste 03/10/23 1400   Dressing Cleansing/Solutions Not Applicable 03/10/23 1400   Dressing Options Hydrofiber Silver Strip;Hydrofiber Silver;Mepilex;Tubigrip 03/10/23 1400   Dressing Change/Treatment Frequency Monday, Wednesday, Friday, and As Needed 03/10/23 1400   Non-staged Wound Description Full thickness 03/10/23 1400   Wound Length (cm) 2.5 cm 03/10/23 1400   Wound Width (cm) 0.5 cm 03/10/23 1400   Wound Depth (cm) 0.2 cm 03/10/23 1400   Wound Surface Area (cm^2) 1.25 cm^2 03/10/23 1400   Wound Volume (cm^3) 0.25 cm^3 03/10/23 1400   Post-Procedure Length (cm) 2.5 cm 03/10/23 1400   Post-Procedure Width (cm) 0.5 cm 03/10/23 1400   Post-Procedure Depth (cm) 0.2 cm 03/10/23 1400   Post-Procedure Surface Area (cm^2) 1.25 cm^2 03/10/23 1400   Post-Procedure Volume (cm^3) 0.25 cm^3 03/10/23 1400   Wound Healing % 17 03/10/23 1400   Tunneling (cm) 0 cm 03/10/23 1400   Undermining (cm) 0 cm 03/10/23 1400   Wound Odor None 03/10/23 1400   Exposed Structures None 03/10/23 1400   Number of days: 7       PROCEDURE:   Debridement of sacral wound  -Lidocaine declined, he is insensate  -Curette used to debride wound beds, forceps used to debride bone fragment from left medial lower extremity wound.  Excisional debridement was performed to remove devitalized tissue until healthy, bleeding tissue was visualized.  Total area debrided approximately 1.95 cm², including into undermined area, though measurements are positional.  Tissue debrided into the muscle layer.  -Bleeding controlled with manual pressure.   -Wound care completed by wound RN, refer to flowsheet  -Patient tolerated the procedure well, without c/o pain or discomfort.    PROCEDURE: Debridement of left heel pressure injury  -2% viscous lidocaine applied topically to wound bed for approximately 5 minutes prior to debridement  -Curette used to debride wound bed.  Excisional debridement was performed to remove devitalized tissue until healthy, bleeding tissue was visualized.   Entire surface of wound, 3 cm2 debrided.  Tissue debrided into the subcutaneous layer.  -Bleeding controlled with manual pressure.    -Wound care completed by wound RN, refer to flowsheet  -Patient tolerated the procedure well, without c/o pain or discomfort.      BIOLOGIC LOG  5/20/2022-first application.  PRODUCT: EpiCord 2 x 3 cm . PRODUCT #[id number]; EXPIRES: 2026-12-01 5/27/2022- second application.  PRODUCT: EpiCordEX 2 x 3 cm . PRODUCT #[id number]; EXPIRES: 2026-09-01    6/3/2022- third application.  PRODUCT: EpiCordEX 2 x 3 cm . PRODUCT #[id number]; EXPIRES: 2026-10-01    6/10/2022- fourth application.  PRODUCT: EpiCordEX 2 x 3 cm . PRODUCT #EX [id number]; EXPIRES: 2026-09-01 6/17/2022- fifth application.  PRODUCT: EpiCordEX 2 x 3 cm . PRODUCT #EX [id number]; EXPIRES: 2027-02-01 6/24/2022- sixth application.  PRODUCT: EpiCordEX 2 x 3 cm . PRODUCT #[id number]; EXPIRES: 2027-02-01 07/01/22: Seventh application.  Product: Epicort Dx 2.0 x 3.0  cm reorder number [id number]; product number [id number]; expires 2027-02-01 7/22/2022- eighth application.  PRODUCT: EpiCord 2 x 3 cm, reorder #[id number]. PRODUCT #[id number]; EXPIRES: 2027-02-01      Pertinent Labs and Diagnostics:    Labs:     A1c: No results found for: HBA1C     [facility] results, 7/1/2022 (under media tab)    CRP 13    ESR 31      IMAGING:     10/3/2022-CT of pelvis with contrast  IMPRESSION:     1.  Posterior soft tissue sacral wound and 1.5 x 3.7 cm abscess.   2.  Progressive destruction of the distal sacrum and proximal coccyx. Sclerosis and irregularity of the distal sacrum consistent with osteomyelitis.   3.  Old wound within the soft tissues posterior to the distal left SI joint with possible fistulous communication.    10/3/2022-CT of tib-fib with and without contrast, with reconstruction  IMPRESSION:     1.  Old fractures of the left tibia and fibula with extensive callus formation and bony bridging as well as extensive dystrophic calcifications. Similar to previous.   2.  Distal medial soft tissue wounds with ill-defined superficial in the soft tissue thickening and fluid collections suggestive of phlegmon. No organized abscess identified.   3.  No definite osteomyelitis.   4.  Arthritic changes.        VASCULAR STUDIES: No results found.    LAST  WOUND CULTURE:   Lab Results   Component Value Date/Time    CULTRSULT Light growth mixed enteric ade. 02/26/2023 10:29 PM      PATHOLOGY  2/17/2023-bone fragment extracted from left lower extremity wound\\  FINAL DIAGNOSIS:     A. Left leg bone fragment at base of chronic wound:          Extensively degenerated fibrocartilaginous tissue with a rim of           fibrinopurulent debris          Correlate with culture findings            Comment: While no residual intact bone is identified, these           findings are suggestive of adjacent osteomyelitis.      ASSESSMENT AND PLAN:     1. Sacral decubitus ulcer, stage IV  ([facility])  Comments: Ulcer first noted in early April 2022 as small open area which quickly enlarged.  This ulcer is present distal from previous sacral ulcer which healed after surgery in January.  Patient has history of flap reconstruction x2 to this area.  CT shows progressive destruction of distal sacrum and proximal coccyx.    3/10/2023: Largely unchanged, measures slightly smaller.  - Excisional debridement was performed in clinic, medically necessary to promote wound healing.  -Patient is to return to clinic weekly for assessment and debridement   -Home health to see patient 1-2 times per week in between clinic visits  - Patient does not currently have follow up with Dr. Saiin. If wound deteriorates or fails to improve can consider re-establishing with Dr. Saini for evaluation for coverage options.  -Patient to continue offloading sacral area as much as possible  -Roho cushion in wheelchair     Wound care: Hydrofiber silver strip, hydrofiber silver, Mepilex    2. Postoperative wound dehiscence, subsequent encounter  Comments: On 4/26 patient underwent irrigation and debridement of multiple compartments of the left lower extremity states for pressure injury, with bone excision, and complex closure of chronic wound using biologic skin substitute.  During postop visit in surgeons office on 5/11, surgical site was noted to be dehisced.  Patient was referred to wound clinic for management.    3/10/2023:   - Wound largely unchanged, maroon boggy tissue  - Tissue removed from wound on 2/17 was found to be extensively degenerated fibrocartilaginous tissue with rim of fibropurulent debris which is suggestive of adjacent OM.  - Xray on 3/7 demonstrated no acute pathology and only old fracture and deformity. Did not suggest acute OM.  - Discussed disparate results, he does not want to proceed with aggressive treatment and will monitor for acute infection.   -Patient to return to clinic weekly for assessment and  debridement as needed  -Home health to change dressing 1-2 times per week in between clinic visits     Wound care: Hydrofera Blue, Mepilex    3. Deep tissue injury  4. Pressure injury of left leg, unstageable ([facility])  Comments: DTI to posterior left lower leg first observed in clinic 7/29/2022.  Patient does not know how it started, had been wearing heel float boot consistently.    3/10/2023: Remains stable, skin intact but discolored  -Continue to monitor for any deterioration in tissue quality  -Patient is currently wearing Prevalon boots to help prevent pressure injuries to bilateral lower extremities    Wound care: Mepilex, Tubigrip D    5. Pressure injury of left heel, stage 3 ([facility])  Comments: First noted in clinic on 10/21/2022, originally noted to be stage II    3/10/2023: Wound reopened, superficial  - Excisional debridement was performed in clinic, medically necessary to promote wound healing.  - Patient reports wearing Prevalon boots 24 hours a day. He has been using pillow to offload heel.  - He was given prescription for PRAFO boot for complete offloading of posterior heel     Wound Care: Hydrofiber Silver, Heel Mepilex to reduce pressure and friction    6. Quadriplegia, C5-C7, incomplete ([facility])  Comments: Complicating factor.  Impaired mobility and sensation  -Patient is still spending 7-8 hours/day up in his wheelchair, though he does have appropriate offloading cushion, and knows to reposition frequently.  Wears heel float boots bilaterally at all times      Please note that this note may have been created using voice recognition software. I have worked with technical experts from [employer] to optimize the interface.  I have made every reasonable attempt to correct obvious errors, but there may be errors of grammar and possibly content that I did not discover before finalizing the note.

## 2023-03-10 NOTE — PROGRESS NOTES
Home health orders faxed to [facility] at fax #[phone].   Patient given script for PRAFO boot, copy faxed to [facility] and scanned into [employer].

## 2023-03-10 NOTE — PATIENT INSTRUCTIONS
-Keep your wound dressing clean, dry, and intact.    -Change your dressing if it becomes soiled, soaked, or falls off.    -Remove your compression wrap if you have severe pain, severe swelling, numbness, color change, or temperature change in your toes. If you need to remove your compression wrap, do so by unrolling it. Do not cut the compression wrap off to prevent cutting yourself on accident.    -Wound vac may not have any drainage in tube or cannister & it will still be working.   Change cannister if it does become full by pressing tab on side of machine to remove canister and snap on new one. Full canister can be thrown in the trash. If cannister fills with bright red blood - go to ER. Dressing will be changed every MWF at the wound clinic.  If you are having issues with your wound VAC, please consider patching leaks, changing the canister, or calling [phone] for troubleshooting. If the wound VAC has been off or un-operational for over 2 hours, call wound care center to inform them and remove all dressings including black foam and replace with normal saline damp gauze.     -Total contact casting - Do not get cast wet. If wet, call clinic immediately for removal. Do not walk on cast without boot. If you are experiencing any pain, call your provider. If the wound center is closed, go to the emergency room.    - Resolved wound be fragile for a few days, bathe and dry area gently, only ever regains a maximum of 80% of the tensile strength of the surrounding skin, remodeling of scar can continue for 6mo - a year. Contact PCP for a referral back to wound care if any problems with area opening and draining again.    -Should you experience any significant changes in your wound(s), such as infection (redness, swelling, localized heat, increased pain, fever > 101 F, chills) or have any questions regarding your home care instructions, please contact the wound center at [phone]. If after hours, contact your  primary care physician or go to the hospital emergency room.

## 2023-03-17 ENCOUNTER — NON-PROVIDER VISIT (OUTPATIENT)
Dept: CARDIOLOGY | Facility: MEDICAL CENTER | Age: 73
End: 2023-03-17
Payer: MEDICARE

## 2023-03-17 PROCEDURE — 93298 REM INTERROG DEV EVAL SCRMS: CPT | Performed by: INTERNAL MEDICINE

## 2023-03-17 NOTE — CARDIAC REMOTE MONITOR - SCAN
Device transmission reviewed. Device demonstrated appropriate function.       Electronically Signed by: Elías Merino M.D.    3/20/2023  4:38 PM

## 2023-03-24 ENCOUNTER — OFFICE VISIT (OUTPATIENT)
Dept: WOUND CARE | Facility: MEDICAL CENTER | Age: 73
End: 2023-03-24
Attending: STUDENT IN AN ORGANIZED HEALTH CARE EDUCATION/TRAINING PROGRAM
Payer: MEDICARE

## 2023-03-24 VITALS
HEART RATE: 50 BPM | OXYGEN SATURATION: 96 % | SYSTOLIC BLOOD PRESSURE: 113 MMHG | RESPIRATION RATE: 18 BRPM | DIASTOLIC BLOOD PRESSURE: 66 MMHG | TEMPERATURE: 97.6 F

## 2023-03-24 DIAGNOSIS — G82.54 QUADRIPLEGIA, C5-C7, INCOMPLETE (HCC): ICD-10-CM

## 2023-03-24 DIAGNOSIS — L89.890 PRESSURE INJURY OF LEFT LEG, UNSTAGEABLE (HCC): ICD-10-CM

## 2023-03-24 DIAGNOSIS — L89.623 PRESSURE INJURY OF LEFT HEEL, STAGE 3 (HCC): ICD-10-CM

## 2023-03-24 DIAGNOSIS — L89.154 SACRAL DECUBITUS ULCER, STAGE IV (HCC): Primary | ICD-10-CM

## 2023-03-24 DIAGNOSIS — S81.802D WOUND OF LEFT LOWER EXTREMITY, SUBSEQUENT ENCOUNTER: ICD-10-CM

## 2023-03-24 DIAGNOSIS — T81.31XD POSTOPERATIVE WOUND DEHISCENCE, SUBSEQUENT ENCOUNTER: ICD-10-CM

## 2023-03-24 DIAGNOSIS — T14.8XXA DEEP TISSUE INJURY: ICD-10-CM

## 2023-03-24 PROCEDURE — 99213 OFFICE O/P EST LOW 20 MIN: CPT

## 2023-03-24 PROCEDURE — 99213 OFFICE O/P EST LOW 20 MIN: CPT | Performed by: STUDENT IN AN ORGANIZED HEALTH CARE EDUCATION/TRAINING PROGRAM

## 2023-03-26 NOTE — PROGRESS NOTES
Provider Encounter- Pressure Injury        HISTORY OF PRESENT ILLNESS  Wound History:    START OF CARE IN CLINIC: 3/3/2023 (return to clinic after hospitalization)    REFERRING PROVIDER: Sahara Mendez      WOUNDS- Pressure Injury, Sacrococcygeal, stage IV                                                             Surgical wound dehiscence, left medial lower leg-status post surgical I&D of stage IV pressure injury and           biologic application                    Pressure injury, DTI, left posterior lower leg-first observed in clinic 7/29/2022       Pressure injury stage III L heel - first noted 10/21     Right 2nd toe avulsion - first noted 10/21     Skin tag left posterior ear - first noted 10/21     HISTORY: Patient with history of incomplete quadriplegia referred to [facility] for treatment of a stage IV pressure injury.  He has a history of previous pressure injuries to this area, and underwent muscle flaps in 2019, and then again in 2020.  He was seen in the wound clinic in November 2021 for an ulcer proximal from his current ulcer, and pressure injuries to his left posterior lower leg and left heel.  At that time, it was discovered that the patient had retained VAC foam embedded in the wound bed of the sacral wound.  Attempts were made to get him back to his plastic surgeon, though unsuccessful.  In January he underwent surgical removal of VAC sponge along with excisional debridement of his sacral wound by Dr. Chaves.  After the surgery, his wound went on to heal without incident.   In early April 2022, his home health nurse noted a new sacral ulcer, below the previous ulcer which quickly tripled in size over the following weeks.  The ulcer to his left medial lower leg had also deteriorated, with bone visible at the base..  He was hospitalized from 4/22 until 4/27/2022 and underwent surgery with Dr. Raman on 4/26 for irrigation and debridement of multiple compartments of the left lower extremity, bone  excision, and complex closure of chronic wound using biologic skin substitute.   His sacrococcygeal wound was not surgically addressed during this admission.  He was discharged back to his group home, with home health, and referral to outpatient wound clinic for his sacral wound.  He was instructed to follow-up with his surgeon for his lower leg wound.       Postoperatively, the left medial lower leg incision dehisced.  He was seen by his surgeon at [facility] on 5/11.  The surgeon opted to leave remaining sutures in place, and refer him to the wound clinic for treatment of this wound.   Treatment of this wound was initiated in clinic on 5/12.  During this visit was also noted that his heel DTI had resolved, but that he had a new pressure injury to his left posterior lower extremity.     A new pressure injury was noted to patient's right upper buttock/lower back on 5/20/2022.  Wound was linear in shape, skin discolored but intact.     Abrasion noted to left anterior lower leg.  First observed in clinic on 7/22/2022.  Patient states he bumped his leg into his food tray.     Small DTI noted to patient's left lateral lower leg on 7/29/2022.  Skin intact but discolored.     Large area of deep tissue injury noted to patient's left exterior lower leg.  Patient denied any trauma to this area.  Skin intact.  Wound documented.    1/27/2023: Patient was admitted to [facility] from 1/23-1/25/2023 with gross hematuria. He underwent RICHARD which showed watchman device was in place and he was taken off of Xarelto. While hospitalized wound team was consulted. He was referred back to [facility] and home health upon discharge.    Patient was hospitalized at [facility] for pyelonephritis from 2/26 until 3/2/2023, admitted for fever and general malaise.  He was admitted and initially started on linezolid and meropenem for suspected UTI and history of multidrug-resistant organisms.  Urine cultures were negative. ID was consulted, recommended CT of chest and  "abdomen,which were negative for acute findings. However, he was treated with 5 days total course of antibiotics for suspected UTI, and symptoms completely resolved.  During this admission, the inpatient wound team was consulted for treatment of his sacral and lower leg wounds.  A wound culture was taken from his left heel pressure ulcer, negative.  Once stabilized, he was discharged home and referred back to [facility] to resume treatment of his wounds.    Pertinent Medical History: Incomplete quadriplegia, history of stage IV pressure injuries, history of flap procedures to sacral pressure injuries, osteomyelitis, obesity, colostomy in place   Contributing factors: Immobility and Obesity, impaired sensation    Personal support: Attendant-staff at [facility] and home health nursing    TOBACCO USE:   Former smoker, quit in 1977.  Never used smokeless tobacco    Patient's problem list, allergies, and current medications reviewed and updated in Epic    Interval History:  Interval History thinned 7/29/2022.  Please see previous notes for complete interval history.     Interval History thinned 1/27/2023. Please see previous note for complete interval history.    Interval History thinned 3/3/2023.  Please see previous notes for complete interval history.      3/3/2023 : Clinic visit with ADILSON Arthur, FNPEGGY-BC, CWDINESHN, CFTRACI.   Patient returns to clinic after hospitalization for UTI.  He states that overall he is feeling well, denies fevers, chills, nausea, vomiting, cough or shortness of breath.    Per last wound clinic note, a loose bone fragment was extracted from the wound bed of his left medial leg wound and sent for pathology.  Histology report described, \"extensively degenerated fibrocartilaginous tissue\", suggestive of adjacent osteomyelitis.  X-ray of tib-fib ordered to assess for OM.    3/10/2023: Clinic visit with Steve Hodges MD. Patient reports feeling in normal state of health. He obtained Xray left tib-fib, " we do not have images, but report states no evidence of acute pathology such as OM. We discussed options for more aggressive treatment, patient would prefer to watch and wait. Left heel remains open since hospitalization despite using Prevalon boots, will prescribe Prafo boot for offloading. Sacrum measures slightly smaller, does not appear significantly changed.    3/24/2023: Clinic visit with Steve Hodges MD. Patient reports feeling well. He obtained PRAFO boot and left heel wound smaller. Rest of wounds are not significantly changed. New linear friction wound buttocks. Patient reports occurred when transitioning from laying to sitting with his low airloss mattress.     REVIEW OF SYSTEMS:   Unchanged from previous wound clinic assessment on 3/10/2023, except as noted in interval history above         PHYSICAL EXAMINATION:   /66   Pulse (!) 50   Temp 36.4 °C (97.6 °F) (Temporal)   Resp 18   SpO2 96%   Physical Exam  Constitutional:       Appearance: He is obese.   Cardiovascular:      Rate and Rhythm: Normal rate.   Pulmonary:      Effort: Pulmonary effort is normal.   Abdominal:      Comments: Colostomy left lower quadrant   Genitourinary:     Comments: Suprapubic catheter  Skin:     Comments: Stage IV coccygeal pressure injury - Largely unchanged    Full-thickness wound to left medial lower leg - Slight improvement, tissue friable, dark red/maroon in color, periwound also dark red/maroon, probes to bone, moderate amount of sanguinous drainage, no odor    Stage III pressure injury left posterior heel - Measuring smaller    Refer to wound flowsheet and photos   Neurological:      Mental Status: He is alert and oriented to person, place, and time.   Psychiatric:         Mood and Affect: Mood normal.       WOUND ASSESSMENT  Wound 11/11/22 LLE Medial anterior (Active)   Wound Image   03/24/23 1406   Site Assessment Red;Boggy;Fragile;Purple 03/24/23 1406   Periwound Assessment Purple;Fragile;Scar tissue  03/24/23 1406   Margins Unattached edges 03/24/23 1406   Drainage Amount Moderate 03/24/23 1406   Drainage Description Serosanguineous 03/24/23 1406   Treatments Cleansed;Site care 03/24/23 1406   Wound Cleansing Normal Saline Irrigation 03/24/23 1406   Periwound Protectant Skin Protectant Wipes to Periwound;Barrier Paste 03/24/23 1406   Dressing Cleansing/Solutions Not Applicable 03/24/23 1406   Dressing Options Hydrofiber Silver;Silicone Adhesive Foam;Tubigrip 03/24/23 1406   Dressing Changed Changed 02/17/23 1500   Dressing Change/Treatment Frequency Monday, Wednesday, Friday, and As Needed 03/24/23 1406   Non-staged Wound Description Full thickness 03/24/23 1406   Wound Length (cm) 1 cm 03/24/23 1406   Wound Width (cm) 0.5 cm 03/24/23 1406   Wound Depth (cm) 0.3 cm 03/24/23 1406   Wound Surface Area (cm^2) 0.5 cm^2 03/24/23 1406   Wound Volume (cm^3) 0.15 cm^3 03/24/23 1406   Post-Procedure Length (cm) 1.1 cm 03/10/23 1400   Post-Procedure Width (cm) 1.3 cm 03/10/23 1400   Post-Procedure Depth (cm) 0.5 cm 03/10/23 1400   Post-Procedure Surface Area (cm^2) 1.43 cm^2 03/10/23 1400   Post-Procedure Volume (cm^3) 0.715 cm^3 03/10/23 1400   Wound Healing % 78 03/24/23 1406   Tunneling (cm) 0 cm 03/24/23 1406   Tunneling Clock Position of Wound 9 11/11/22 1300   Undermining (cm) 0 cm 03/24/23 1406   Wound Odor None 03/24/23 1406   Exposed Structures None 03/24/23 1406   Number of days: 135       Wound 02/27/23 Pressure Injury Coccyx Stage IV (Active)   Wound Image   03/24/23 1406   Site Assessment Pink;Red;Yellow 03/24/23 1406   Periwound Assessment Fragile;Scar tissue 03/24/23 1406   Margins Unattached edges 03/24/23 1406   Drainage Amount Large 03/24/23 1406   Drainage Description Serosanguineous 03/24/23 1406   Treatments Cleansed;Site care 03/24/23 1406   Wound Cleansing Normal Saline Irrigation 03/24/23 1406   Periwound Protectant Skin Protectant Wipes to Periwound;Barrier Paste 03/24/23 1406   Dressing  Cleansing/Solutions Not Applicable 03/24/23 1406   Dressing Options Hydrofiber Silver Strip;Hydrofiber Silver;Other (Comments) 03/24/23 1406   Dressing Change/Treatment Frequency Monday, Wednesday, Friday, and As Needed 03/24/23 1406   WOUND NURSE ONLY - Pressure Injury Stage 4 03/24/23 1406   Wound Length (cm) 1.5 cm 03/24/23 1406   Wound Width (cm) 1.7 cm 03/24/23 1406   Wound Depth (cm) 1.1 cm 03/24/23 1406   Wound Surface Area (cm^2) 2.55 cm^2 03/24/23 1406   Wound Volume (cm^3) 2.805 cm^3 03/24/23 1406   Post-Procedure Length (cm) 1.3 cm 03/10/23 1400   Post-Procedure Width (cm) 1.5 cm 03/10/23 1400   Post-Procedure Depth (cm) 1.1 cm 03/10/23 1400   Post-Procedure Surface Area (cm^2) 1.95 cm^2 03/10/23 1400   Post-Procedure Volume (cm^3) 2.145 cm^3 03/10/23 1400   Wound Healing % 23 03/24/23 1406   Tunneling (cm) 1 cm 03/24/23 1406   Tunneling Clock Position of Wound 12 03/24/23 1406   Undermining (cm) 0 cm 03/10/23 1400   Wound Odor None 03/24/23 1406   Exposed Structures Other (Comments) 03/24/23 1406   Number of days: 27       Wound 02/27/23 Heel Posterior Left (Active)   Wound Image   03/24/23 1406   Site Assessment Red;[place] 03/24/23 1406   Periwound Assessment Blanchable erythema;Fragile 03/24/23 1406   Margins Attached edges 03/24/23 1406   Drainage Amount Moderate 03/24/23 1406   Drainage Description Serosanguineous 03/24/23 1406   Treatments Cleansed;Site care 03/24/23 1406   Wound Cleansing Normal Saline Irrigation 03/24/23 1406   Periwound Protectant Skin Protectant Wipes to Periwound;Barrier Paste 03/24/23 1406   Dressing Cleansing/Solutions Not Applicable 03/24/23 1406   Dressing Options Hydrofiber Silver;Other (Comments);Tubigrip 03/24/23 1406   Dressing Change/Treatment Frequency Monday, Wednesday, Friday, and As Needed 03/10/23 1400   WOUND NURSE ONLY - Pressure Injury Stage 3 03/24/23 1406   Wound Length (cm) 0.5 cm 03/24/23 1406   Wound Width (cm) 1.1 cm 03/24/23 1406   Wound Depth (cm) 0.1 cm  03/24/23 1406   Wound Surface Area (cm^2) 0.55 cm^2 03/24/23 1406   Wound Volume (cm^3) 0.055 cm^3 03/24/23 1406   Post-Procedure Length (cm) 1.5 cm 03/10/23 1400   Post-Procedure Width (cm) 2 cm 03/10/23 1400   Post-Procedure Depth (cm) 0.2 cm 03/10/23 1400   Post-Procedure Surface Area (cm^2) 3 cm^2 03/10/23 1400   Post-Procedure Volume (cm^3) 0.6 cm^3 03/10/23 1400   Wound Healing % 95 03/24/23 1406   Tunneling (cm) 0 cm 03/24/23 1406   Undermining (cm) 0 cm 03/24/23 1406   Wound Odor None 03/24/23 1406   Exposed Structures None 03/24/23 1406   Number of days: 27       Wound 03/03/23 Full Thickness Wound Leg LLE medial posterior (Active)   Wound Image   03/24/23 1406   Site Assessment Red;Yellow 03/24/23 1406   Periwound Assessment Purple;Scar tissue 03/24/23 1406   Margins Unattached edges 03/24/23 1406   Drainage Amount Moderate 03/24/23 1406   Drainage Description Serosanguineous 03/24/23 1406   Treatments Cleansed;Site care 03/24/23 1406   Wound Cleansing Normal Saline Irrigation 03/24/23 1406   Periwound Protectant Skin Protectant Wipes to Periwound;Barrier Paste 03/24/23 1406   Dressing Cleansing/Solutions Not Applicable 03/24/23 1406   Dressing Options Hydrofiber Silver Strip;Hydrofiber Silver;Silicone Adhesive Foam;Tubigrip 03/24/23 1406   Dressing Change/Treatment Frequency Monday, Wednesday, Friday, and As Needed 03/24/23 1406   Non-staged Wound Description Full thickness 03/24/23 1406   Wound Length (cm) 1.9 cm 03/24/23 1406   Wound Width (cm) 0.5 cm 03/24/23 1406   Wound Depth (cm) 0.1 cm 03/24/23 1406   Wound Surface Area (cm^2) 0.95 cm^2 03/24/23 1406   Wound Volume (cm^3) 0.095 cm^3 03/24/23 1406   Post-Procedure Length (cm) 2.5 cm 03/10/23 1400   Post-Procedure Width (cm) 0.5 cm 03/10/23 1400   Post-Procedure Depth (cm) 0.2 cm 03/10/23 1400   Post-Procedure Surface Area (cm^2) 1.25 cm^2 03/10/23 1400   Post-Procedure Volume (cm^3) 0.25 cm^3 03/10/23 1400   Wound Healing % 68 03/24/23 1406    Tunneling (cm) 0 cm 03/24/23 1406   Undermining (cm) 0 cm 03/24/23 1406   Wound Odor None 03/24/23 1406   Exposed Structures None 03/24/23 1406   Number of days: 23       Wound 03/24/23 Partial Thickness Wound Buttocks Right (Active)   Wound Image   03/24/23 1406   Site Assessment Pink;Red 03/24/23 1406   Periwound Assessment Scar tissue 03/24/23 1406   Margins Attached edges 03/24/23 1406   Drainage Amount Small 03/24/23 1406   Drainage Description Serosanguineous 03/24/23 1406   Treatments Cleansed;Site care 03/24/23 1406   Wound Cleansing Normal Saline Irrigation 03/24/23 1406   Periwound Protectant Skin Protectant Wipes to Periwound;Barrier Paste 03/24/23 1406   Dressing Cleansing/Solutions Not Applicable 03/24/23 1406   Dressing Options Hydrofiber Silver;Other (Comments) 03/24/23 1406   Non-staged Wound Description Partial thickness 03/24/23 1406   Wound Length (cm) 0.2 cm 03/24/23 1406   Wound Width (cm) 0.5 cm 03/24/23 1406   Wound Depth (cm) 0 cm 03/24/23 1406   Wound Surface Area (cm^2) 0.1 cm^2 03/24/23 1406   Wound Volume (cm^3) 0 cm^3 03/24/23 1406   Tunneling (cm) 0 cm 03/24/23 1406   Undermining (cm) 0 cm 03/24/23 1406   Wound Odor None 03/24/23 1406   Exposed Structures None 03/24/23 1406   Number of days: 2       PROCEDURE:    - Wounds did not require debridement today      BIOLOGIC LOG  5/20/2022-first application.  PRODUCT: EpiCord 2 x 3 cm . PRODUCT #[id number]; EXPIRES: 2026-12-01 5/27/2022- second application.  PRODUCT: EpiCordEX 2 x 3 cm . PRODUCT #[id number]; EXPIRES: 2026-09-01    6/3/2022- third application.  PRODUCT: EpiCordEX 2 x 3 cm . PRODUCT #EX [id number]; EXPIRES: 2026-10-01    6/10/2022- fourth application.  PRODUCT: EpiCordEX 2 x 3 cm . PRODUCT #[id number]; EXPIRES: 2026-09-01 6/17/2022- fifth application.  PRODUCT: EpiCordEX 2 x 3 cm . PRODUCT #EX [id number]; EXPIRES: 2027-02-01 6/24/2022- sixth application.  PRODUCT: EpiCordEX 2 x 3 cm  . PRODUCT #EX [id number]; EXPIRES: 2027-02-01 07/01/22: Seventh application.  Product: Epicort Dx 2.0 x 3.0 cm reorder number [id number]; product number [id number]; expires 2027-02-01 7/22/2022- eighth application.  PRODUCT: EpiCord 2 x 3 cm, reorder #[id number]. PRODUCT #[id number]; EXPIRES: 2027-02-01      Pertinent Labs and Diagnostics:    Labs:     A1c: No results found for: HBA1C     [facility] results, 7/1/2022 (under media tab)    CRP 13    ESR 31      IMAGING:     10/3/2022-CT of pelvis with contrast  IMPRESSION:     1.  Posterior soft tissue sacral wound and 1.5 x 3.7 cm abscess.   2.  Progressive destruction of the distal sacrum and proximal coccyx. Sclerosis and irregularity of the distal sacrum consistent with osteomyelitis.   3.  Old wound within the soft tissues posterior to the distal left SI joint with possible fistulous communication.    10/3/2022-CT of tib-fib with and without contrast, with reconstruction  IMPRESSION:     1.  Old fractures of the left tibia and fibula with extensive callus formation and bony bridging as well as extensive dystrophic calcifications. Similar to previous.   2.  Distal medial soft tissue wounds with ill-defined superficial in the soft tissue thickening and fluid collections suggestive of phlegmon. No organized abscess identified.   3.  No definite osteomyelitis.   4.  Arthritic changes.        VASCULAR STUDIES: No results found.    LAST  WOUND CULTURE:   Lab Results   Component Value Date/Time    CULTRSULT Light growth mixed enteric ade. 02/26/2023 10:29 PM      PATHOLOGY  2/17/2023-bone fragment extracted from left lower extremity wound\\  FINAL DIAGNOSIS:     A. Left leg bone fragment at base of chronic wound:          Extensively degenerated fibrocartilaginous tissue with a rim of           fibrinopurulent debris          Correlate with culture findings            Comment: While no residual intact bone is identified, these           findings are  suggestive of adjacent osteomyelitis.      ASSESSMENT AND PLAN:     1. Sacral decubitus ulcer, stage IV ([facility])  Comments: Ulcer first noted in early April 2022 as small open area which quickly enlarged.  This ulcer is present distal from previous sacral ulcer which healed after surgery in January.  Patient has history of flap reconstruction x2 to this area.  CT shows progressive destruction of distal sacrum and proximal coccyx.    3/24/2023: Largely unchanged  - Debridement not required today in clinic  -Patient is to return to clinic weekly for assessment and debridement   -Home health to see patient 1-2 times per week in between clinic visits  - Patient does not currently have follow up with Dr. Saini. If wound deteriorates or fails to improve can consider re-establishing with Dr. Saini for evaluation for coverage options.  -Patient to continue offloading sacral area as much as possible  -Carloso cushion in wheelchair     Wound care: Hydrofiber silver strip, hydrofiber silver, Mepilex    2. Postoperative wound dehiscence, subsequent encounter  Comments: On 4/26 patient underwent irrigation and debridement of multiple compartments of the left lower extremity states for pressure injury, with bone excision, and complex closure of chronic wound using biologic skin substitute.  During postop visit in surgeons office on 5/11, surgical site was noted to be dehisced.  Patient was referred to wound clinic for management.    3/24/2023:   - Wound appears slightly improved  - Tissue removed from wound on 2/17 was found to be extensively degenerated fibrocartilaginous tissue with rim of fibropurulent debris which is suggestive of adjacent OM.  - Xray on 3/7 demonstrated no acute pathology and only old fracture and deformity. Did not suggest acute OM.  - Discussed disparate results, he does not want to proceed with aggressive treatment and will monitor for acute infection.   -Patient to return to clinic weekly for assessment and  debridement as needed  -Home health to change dressing 1-2 times per week in between clinic visits     Wound care: Hydrofera Blue, Mepilex    3. Deep tissue injury  4. Pressure injury of left leg, unstageable ([facility])  Comments: DTI to posterior left lower leg first observed in clinic 7/29/2022.  Patient does not know how it started, had been wearing heel float boot consistently.    3/24/2023: Remains stable, skin intact but discolored  -Continue to monitor for any deterioration in tissue quality  -Patient is currently wearing Prevalon boots to help prevent pressure injuries to bilateral lower extremities    Wound care: Mepilex, Tubigrip D    5. Pressure injury of left heel, stage 3 ([facility])  Comments: First noted in clinic on 10/21/2022, originally noted to be stage II    3/24/2023: Improving, measuring smaller  - No debridment required in clinic today  - Patient obtained prafo boot to offload left heel. Using Prevalon boot right foot.     Wound Care: Hydrofiber Silver, Heel Mepilex to reduce pressure and friction    6. Quadriplegia, C5-C7, incomplete ([facility])  Comments: Complicating factor.  Impaired mobility and sensation  -Patient is still spending 7-8 hours/day up in his wheelchair, though he does have appropriate offloading cushion, and knows to reposition frequently.  Wears heel float boots bilaterally at all times    My total time spent caring for the patient on the day of the encounter was 20 minutes, reviewing history, assessment, counseling and education, and coordination of care.  This does not include time spent on separately billable procedures/tests.        Please note that this note may have been created using voice recognition software. I have worked with technical experts from [employer] to optimize the interface.  I have made every reasonable attempt to correct obvious errors, but there may be errors of grammar and possibly content that I did not discover before finalizing the note.

## 2023-03-31 ENCOUNTER — OFFICE VISIT (OUTPATIENT)
Dept: WOUND CARE | Facility: MEDICAL CENTER | Age: 73
End: 2023-03-31
Attending: STUDENT IN AN ORGANIZED HEALTH CARE EDUCATION/TRAINING PROGRAM
Payer: MEDICARE

## 2023-03-31 VITALS
SYSTOLIC BLOOD PRESSURE: 120 MMHG | RESPIRATION RATE: 18 BRPM | OXYGEN SATURATION: 93 % | HEART RATE: 66 BPM | TEMPERATURE: 97.8 F | DIASTOLIC BLOOD PRESSURE: 81 MMHG

## 2023-03-31 DIAGNOSIS — L89.154 SACRAL DECUBITUS ULCER, STAGE IV (HCC): Primary | ICD-10-CM

## 2023-03-31 DIAGNOSIS — G82.53 QUADRIPLEGIA, C5-C7, COMPLETE (HCC): ICD-10-CM

## 2023-03-31 DIAGNOSIS — L89.623 PRESSURE INJURY OF LEFT HEEL, STAGE 3 (HCC): ICD-10-CM

## 2023-03-31 DIAGNOSIS — T14.8XXA DEEP TISSUE INJURY: ICD-10-CM

## 2023-03-31 DIAGNOSIS — T81.31XD POSTOPERATIVE WOUND DEHISCENCE, SUBSEQUENT ENCOUNTER: ICD-10-CM

## 2023-03-31 DIAGNOSIS — L89.890 PRESSURE INJURY OF LEFT LEG, UNSTAGEABLE (HCC): ICD-10-CM

## 2023-03-31 PROCEDURE — 99213 OFFICE O/P EST LOW 20 MIN: CPT | Mod: 25

## 2023-03-31 PROCEDURE — 11042 DBRDMT SUBQ TIS 1ST 20SQCM/<: CPT | Mod: 59 | Performed by: NURSE PRACTITIONER

## 2023-03-31 PROCEDURE — 99213 OFFICE O/P EST LOW 20 MIN: CPT | Mod: 25 | Performed by: NURSE PRACTITIONER

## 2023-03-31 PROCEDURE — 11042 DBRDMT SUBQ TIS 1ST 20SQCM/<: CPT

## 2023-03-31 PROCEDURE — 11043 DBRDMT MUSC&/FSCA 1ST 20/<: CPT | Performed by: NURSE PRACTITIONER

## 2023-03-31 PROCEDURE — 11043 DBRDMT MUSC&/FSCA 1ST 20/<: CPT

## 2023-03-31 NOTE — PROGRESS NOTES
Home health wound care orders routed to [facility] via [employer] to fax #[phone].

## 2023-03-31 NOTE — PROGRESS NOTES
Provider Encounter- Pressure Injury        HISTORY OF PRESENT ILLNESS  Wound History:    START OF CARE IN CLINIC: 3/3/2023 (return to clinic after hospitalization)    REFERRING PROVIDER: Sahara Mendez      WOUNDS- Pressure Injury, Sacrococcygeal, stage IV                                                             Surgical wound dehiscence, left medial lower leg-status post surgical I&D of stage IV pressure injury and           biologic application                    Pressure injury, DTI, left posterior lower leg-first observed in clinic 7/29/2022       Pressure injury stage III L heel - first noted 10/21     Right 2nd toe avulsion - first noted 10/21     Skin tag left posterior ear - first noted 10/21     HISTORY: Patient with history of incomplete quadriplegia referred to [facility] for treatment of a stage IV pressure injury.  He has a history of previous pressure injuries to this area, and underwent muscle flaps in 2019, and then again in 2020.  He was seen in the wound clinic in November 2021 for an ulcer proximal from his current ulcer, and pressure injuries to his left posterior lower leg and left heel.  At that time, it was discovered that the patient had retained VAC foam embedded in the wound bed of the sacral wound.  Attempts were made to get him back to his plastic surgeon, though unsuccessful.  In January he underwent surgical removal of VAC sponge along with excisional debridement of his sacral wound by Dr. Chaves.  After the surgery, his wound went on to heal without incident.   In early April 2022, his home health nurse noted a new sacral ulcer, below the previous ulcer which quickly tripled in size over the following weeks.  The ulcer to his left medial lower leg had also deteriorated, with bone visible at the base..  He was hospitalized from 4/22 until 4/27/2022 and underwent surgery with Dr. Raman on 4/26 for irrigation and debridement of multiple compartments of the left lower extremity, bone  excision, and complex closure of chronic wound using biologic skin substitute.   His sacrococcygeal wound was not surgically addressed during this admission.  He was discharged back to his group home, with home health, and referral to outpatient wound clinic for his sacral wound.  He was instructed to follow-up with his surgeon for his lower leg wound.       Postoperatively, the left medial lower leg incision dehisced.  He was seen by his surgeon at [facility] on 5/11.  The surgeon opted to leave remaining sutures in place, and refer him to the wound clinic for treatment of this wound.   Treatment of this wound was initiated in clinic on 5/12.  During this visit was also noted that his heel DTI had resolved, but that he had a new pressure injury to his left posterior lower extremity.     A new pressure injury was noted to patient's right upper buttock/lower back on 5/20/2022.  Wound was linear in shape, skin discolored but intact.     Abrasion noted to left anterior lower leg.  First observed in clinic on 7/22/2022.  Patient states he bumped his leg into his food tray.     Small DTI noted to patient's left lateral lower leg on 7/29/2022.  Skin intact but discolored.     Large area of deep tissue injury noted to patient's left exterior lower leg.  Patient denied any trauma to this area.  Skin intact.  Wound documented.    1/27/2023: [place] [facility] [place] with gross hematuria. He underwent RICHARD which showed watchman device was in place and he was taken off of Xarelto. While hospitalized wound team was consulted. He was referred back to [facility] and home health upon discharge.    Patient was hospitalized at [facility] for pyelonephritis from 2/26 until 3/2/2023, admitted for fever and general malaise.  He was admitted and initially started on linezolid and meropenem for suspected UTI and history of multidrug-resistant organisms.  Urine cultures were negative. ID was consulted, recommended CT of chest and  "abdomen,which were negative for acute findings. However, he was treated with 5 days total course of antibiotics for suspected UTI, and symptoms completely resolved.  During this admission, the inpatient wound team was consulted for treatment of his sacral and lower leg wounds.  A wound culture was taken from his left heel pressure ulcer, negative.  Once stabilized, he was discharged home and referred back to [facility] to resume treatment of his wounds.    Pertinent Medical History: Incomplete quadriplegia, history of stage IV pressure injuries, history of flap procedures to sacral pressure injuries, osteomyelitis, obesity, colostomy in place   Contributing factors: Immobility and Obesity, impaired sensation    Personal support: Attendant-staff at [facility] and home health nursing    TOBACCO USE:   Former smoker, quit in 1977.  Never used smokeless tobacco    Patient's problem list, allergies, and current medications reviewed and updated in Epic    Interval History:  Interval History thinned 7/29/2022.  Please see previous notes for complete interval history.     Interval History thinned 1/27/2023. Please see previous note for complete interval history.    Interval History thinned 3/3/2023.  Please see previous notes for complete interval history.      3/3/2023 : Clinic visit with ADILSON Arthur, FNPEGGY-BC, CWDINESHN, CFTRACI.   Patient returns to clinic after hospitalization for UTI.  He states that overall he is feeling well, denies fevers, chills, nausea, vomiting, cough or shortness of breath.    Per last wound clinic note, a loose bone fragment was extracted from the wound bed of his left medial leg wound and sent for pathology.  Histology report described, \"extensively degenerated fibrocartilaginous tissue\", suggestive of adjacent osteomyelitis.  X-ray of tib-fib ordered to assess for OM.    3/10/2023: Clinic visit with Steve Hodges MD. Patient reports feeling in normal state of health. He obtained Xray left tib-fib, " we do not have images, but report states no evidence of acute pathology such as OM. We discussed options for more aggressive treatment, patient would prefer to watch and wait. Left heel remains open since hospitalization despite using Prevalon boots, will prescribe Prafo boot for offloading. Sacrum measures slightly smaller, does not appear significantly changed.    3/24/2023: Clinic visit with Steve Hodges MD. Patient reports feeling well. He obtained PRAFO boot and left heel wound smaller. Rest of wounds are not significantly changed. New linear friction wound buttocks. Patient reports occurred when transitioning from laying to sitting with his low airloss mattress.    3/31/2023 : Clinic visit with ADILSON Arthur, FNP-BC, CWOCN, CFCN.   Anjum presents today complaining of cold symptoms.  States he has had an upper respiratory illness for a couple of weeks now but feels he is getting better.  He does have a new tender area to his plantar foot with mild discoloration, possibly caused by seam on insole to PRAFO boot.  He has stopped wearing the PRAFO and is now wearing Prevalon boot, and feels this is improving.  The left medial lower leg wound does show some improvement, with decrease in area.  Sacral wound is about the same.  The left heel ulcer is again almost healed, with thin layer of epithelium noted to wound bed, scant drainage.      REVIEW OF SYSTEMS:   Unchanged from previous wound clinic assessment on 3/24/2023, except as noted in interval history above         PHYSICAL EXAMINATION:   /81   Pulse 66   Temp 36.6 °C (97.8 °F) (Temporal)   Resp 18   SpO2 93%   Physical Exam  Constitutional:       Appearance: He is obese.   Cardiovascular:      Rate and Rhythm: Normal rate.   Pulmonary:      Effort: Pulmonary effort is normal.   Abdominal:      Comments: Colostomy left lower quadrant   Genitourinary:     Comments: Suprapubic catheter  Skin:     Comments: Stage IV coccygeal pressure injury -  Largely unchanged    Full-thickness wound to left medial lower leg - Slight improvement, tissue friable, dark red/maroon in color, periwound also dark red/maroon, probes to bone, moderate amount of sanguinous drainage, no odor    Stage III pressure injury left posterior heel - Measuring smaller    Refer to wound flowsheet and photos   Neurological:      Mental Status: He is alert and oriented to person, place, and time.   Psychiatric:         Mood and Affect: Mood normal.       WOUND ASSESSMENT  Wound 11/11/22 LLE Medial anterior (Active)   Wound Image   03/31/23 1527   Site Assessment Red;Fragile 03/31/23 1527   Periwound Assessment Purple;Fragile;Scar tissue 03/31/23 1527   Margins Attached edges 03/31/23 1527   Drainage Amount Moderate 03/31/23 1527   Drainage Description Serosanguineous 03/31/23 1527   Treatments Cleansed;Site care 03/31/23 1527   Wound Cleansing Normal Saline Irrigation 03/31/23 1527   Periwound Protectant Skin Protectant Wipes to Periwound;Barrier Paste 03/31/23 1527   Dressing Cleansing/Solutions Not Applicable 03/31/23 1527   Dressing Options Hydrofiber Silver;Other (Comments);Tubigrip 03/31/23 1527   Dressing Changed Changed 02/17/23 1500   Dressing Change/Treatment Frequency Monday, Wednesday, Friday, and As Needed 03/31/23 1527   Non-staged Wound Description Full thickness 03/31/23 1527   Wound Length (cm) 0.4 cm 03/31/23 1527   Wound Width (cm) 0.4 cm 03/31/23 1527   Wound Depth (cm) 0.1 cm 03/31/23 1527   Wound Surface Area (cm^2) 0.16 cm^2 03/31/23 1527   Wound Volume (cm^3) 0.016 cm^3 03/31/23 1527   Post-Procedure Length (cm) 1.1 cm 03/10/23 1400   Post-Procedure Width (cm) 1.3 cm 03/10/23 1400   Post-Procedure Depth (cm) 0.5 cm 03/10/23 1400   Post-Procedure Surface Area (cm^2) 1.43 cm^2 03/10/23 1400   Post-Procedure Volume (cm^3) 0.715 cm^3 03/10/23 1400   Wound Healing % 98 03/31/23 1527   Tunneling (cm) 0 cm 03/31/23 1527   Tunneling Clock Position of Wound 9 11/11/22 1300    Undermining (cm) 0 cm 03/31/23 1527   Wound Odor None 03/31/23 1527   Exposed Structures None 03/31/23 1527   Number of days: 140       Wound 02/27/23 Pressure Injury Coccyx Stage IV (Active)   Wound Image    03/31/23 1527   Site Assessment Pink;Red;Yellow 03/31/23 1527   Periwound Assessment Fragile;Scar tissue 03/31/23 1527   Margins Unattached edges 03/31/23 1527   Drainage Amount Large 03/31/23 1527   Drainage Description Serosanguineous 03/31/23 1527   Treatments Cleansed;Provider debridement;Site care 03/31/23 1527   Wound Cleansing Normal Saline Irrigation 03/31/23 1527   Periwound Protectant Skin Protectant Wipes to Periwound;Barrier Paste 03/31/23 1527   Dressing Cleansing/Solutions Not Applicable 03/31/23 1527   Dressing Options Hydrofiber Silver Strip;Hydrofiber Silver;Other (Comments) 03/31/23 1527   Dressing Change/Treatment Frequency Monday, Wednesday, Friday, and As Needed 03/31/23 1527   WOUND NURSE ONLY - Pressure Injury Stage 4 03/31/23 1527   Wound Length (cm) 1.5 cm 03/31/23 1527   Wound Width (cm) 1.8 cm 03/31/23 1527   Wound Depth (cm) 0.9 cm 03/31/23 1527   Wound Surface Area (cm^2) 2.7 cm^2 03/31/23 1527   Wound Volume (cm^3) 2.43 cm^3 03/31/23 1527   Post-Procedure Length (cm) 1.6 cm 03/31/23 1527   Post-Procedure Width (cm) 1.8 cm 03/31/23 1527   Post-Procedure Depth (cm) 0.9 cm 03/31/23 1527   Post-Procedure Surface Area (cm^2) 2.88 cm^2 03/31/23 1527   Post-Procedure Volume (cm^3) 2.592 cm^3 03/31/23 1527   Wound Healing % 33 03/31/23 1527   Tunneling (cm) 1.1 cm 03/31/23 1527   Tunneling Clock Position of Wound 12 03/31/23 1527   Undermining (cm) 0 cm 03/10/23 1400   Wound Odor None 03/31/23 1527   Exposed Structures Bone 03/31/23 1527   Number of days: 32       Wound 02/27/23 Heel Posterior Left (Active)   Wound Image   03/31/23 1527   Site Assessment Red;Pink 03/31/23 1527   Periwound Assessment Blanchable erythema;Fragile 03/31/23 1527   Margins Attached edges 03/31/23 1527    Drainage Amount Moderate 03/31/23 1527   Drainage Description Serosanguineous 03/31/23 1527   Treatments Cleansed;Site care 03/31/23 1527   Wound Cleansing Normal Saline Irrigation 03/31/23 1527   Periwound Protectant Skin Protectant Wipes to Periwound;Barrier Paste 03/31/23 1527   Dressing Cleansing/Solutions Not Applicable 03/31/23 1527   Dressing Options Hydrofiber Silver;Other (Comments);Tubigrip 03/31/23 1527   Dressing Change/Treatment Frequency Monday, Wednesday, Friday, and As Needed 03/31/23 1527   WOUND NURSE ONLY - Pressure Injury Stage 3 03/31/23 1527   Wound Length (cm) 0.4 cm 03/31/23 1527   Wound Width (cm) 0.4 cm 03/31/23 1527   Wound Depth (cm) 0.1 cm 03/31/23 1527   Wound Surface Area (cm^2) 0.16 cm^2 03/31/23 1527   Wound Volume (cm^3) 0.016 cm^3 03/31/23 1527   Post-Procedure Length (cm) 1.5 cm 03/10/23 1400   Post-Procedure Width (cm) 2 cm 03/10/23 1400   Post-Procedure Depth (cm) 0.2 cm 03/10/23 1400   Post-Procedure Surface Area (cm^2) 3 cm^2 03/10/23 1400   Post-Procedure Volume (cm^3) 0.6 cm^3 03/10/23 1400   Wound Healing % 98 03/31/23 1527   Tunneling (cm) 0 cm 03/31/23 1527   Undermining (cm) 0 cm 03/31/23 1527   Wound Odor None 03/31/23 1527   Exposed Structures None 03/31/23 1527   Number of days: 32       Wound 03/03/23 Full Thickness Wound Leg LLE medial posterior (Active)   Wound Image    03/31/23 1527   Site Assessment Purple;Red;Yellow 03/31/23 1527   Periwound Assessment Purple;Scar tissue 03/31/23 1527   Margins Attached edges 03/31/23 1527   Drainage Amount Moderate 03/31/23 1527   Drainage Description Serosanguineous 03/31/23 1527   Treatments Cleansed;Provider debridement;Site care 03/31/23 1527   Wound Cleansing Normal Saline Irrigation 03/31/23 1527   Periwound Protectant Skin Protectant Wipes to Periwound;Barrier Paste 03/31/23 1527   Dressing Cleansing/Solutions Not Applicable 03/31/23 1527   Dressing Options Hydrofiber Silver;Other (Comments);Tubigrip 03/31/23 1527    Dressing Change/Treatment Frequency Monday, Wednesday, Friday, and As Needed 03/31/23 1527   Non-staged Wound Description Full thickness 03/31/23 1527   Wound Length (cm) 1 cm 03/31/23 1527   Wound Width (cm) 0.5 cm 03/31/23 1527   Wound Depth (cm) 0.1 cm 03/31/23 1527   Wound Surface Area (cm^2) 0.5 cm^2 03/31/23 1527   Wound Volume (cm^3) 0.05 cm^3 03/31/23 1527   Post-Procedure Length (cm) 1 cm 03/31/23 1527   Post-Procedure Width (cm) 0.5 cm 03/31/23 1527   Post-Procedure Depth (cm) 0.2 cm 03/31/23 1527   Post-Procedure Surface Area (cm^2) 0.5 cm^2 03/31/23 1527   Post-Procedure Volume (cm^3) 0.1 cm^3 03/31/23 1527   Wound Healing % 83 03/31/23 1527   Tunneling (cm) 0 cm 03/31/23 1527   Undermining (cm) 0 cm 03/31/23 1527   Wound Odor None 03/31/23 1527   Exposed Structures None 03/31/23 1527   Number of days: 28       Wound 03/31/23 Pressure Injury Foot Plantar;Distal Left (Active)   Wound Image   03/31/23 1527   Site Assessment Purple;[place] 03/31/23 1527   Periwound Assessment Intact 03/31/23 1527   Margins Defined edges 03/31/23 1527   Drainage Amount None 03/31/23 1527   Treatments Cleansed;Site care 03/31/23 1527   Wound Cleansing Normal Saline Irrigation 03/31/23 1527   Periwound Protectant Skin Protectant Wipes to Periwound;Barrier Paste 03/31/23 1527   Dressing Cleansing/Solutions Not Applicable 03/31/23 1527   Dressing Options Hydrofiber Silver;Silicone Adhesive Foam;Tubigrip 03/31/23 1527   WOUND NURSE ONLY - Pressure Injury Stage U 03/31/23 1527   Tunneling (cm) 0 cm 03/31/23 1527   Undermining (cm) 0 cm 03/31/23 1527   Wound Odor None 03/31/23 1527   Exposed Structures None 03/31/23 1527   Number of days: 0       PROCEDURE: Excisional debridement of sacral wound  -2% viscous lidocaine applied topically to wound bed for approximately 5 minutes prior to debridement  -Curette used to debride wound bed.  Excisional debridement was performed to remove devitalized tissue until healthy, bleeding tissue was  visualized.   Entire surface of wound, 2.88 cm² debrided.  Tissue debrided into the muscle layer.    -Bleeding controlled with manual pressure.    -Wound care completed by wound RN, refer to flowsheet  -Patient tolerated the procedure well, without c/o pain or discomfort.      PROCEDURE: Excisional debridement of left medial/posterior lower leg wound  -2% viscous lidocaine applied topically to wound bed for approximately 5 minutes prior to debridement  -Curette used to debride wound bed.  Excisional debridement was performed to remove devitalized tissue until healthy, bleeding tissue was visualized.   Entire surface of wound, 0.5 cm² debrided.  Tissue debrided into the subcutaneous layer.    -Bleeding controlled with manual pressure.    -Wound care completed by wound RN, refer to flowsheet  -Patient tolerated the procedure well, without c/o pain or discomfort.        BIOLOGIC LOG  5/20/2022-first application.  PRODUCT: EpiCord 2 x 3 cm . PRODUCT #[id number]; EXPIRES: 2026-12-01 5/27/2022- second application.  PRODUCT: EpiCordEX 2 x 3 cm . PRODUCT #[id number]; EXPIRES: 2026-09-01    6/3/2022- third application.  PRODUCT: EpiCordEX 2 x 3 cm . PRODUCT #[id number]; EXPIRES: 2026-10-01    6/10/2022- fourth application.  PRODUCT: EpiCordEX 2 x 3 cm . PRODUCT #[id number]; EXPIRES: 2026-09-01 6/17/2022- fifth application.  PRODUCT: EpiCordEX 2 x 3 cm . PRODUCT #[id number]; EXPIRES: 2027-02-01 6/24/2022- sixth application.  PRODUCT: EpiCordEX 2 x 3 cm . PRODUCT #[id number]; EXPIRES: 2027-02-01 07/01/22: Seventh application.  Product: Epicort Dx 2.0 x 3.0 cm reorder number [id number]; product number [id number]; expires 2027-02-01 7/22/2022- eighth application.  PRODUCT: EpiCord 2 x 3 cm, reorder #[id number]. PRODUCT #[id number]; EXPIRES: 2027-02-01      Pertinent Labs and Diagnostics:    Labs:     A1c: No results found for: HBA1C     [facility] results,  7/1/2022 (under media tab)    CRP 13    ESR 31      IMAGING:     10/3/2022-CT of pelvis with contrast  IMPRESSION:     1.  Posterior soft tissue sacral wound and 1.5 x 3.7 cm abscess.   2.  Progressive destruction of the distal sacrum and proximal coccyx. Sclerosis and irregularity of the distal sacrum consistent with osteomyelitis.   3.  Old wound within the soft tissues posterior to the distal left SI joint with possible fistulous communication.    10/3/2022-CT of tib-fib with and without contrast, with reconstruction  IMPRESSION:     1.  Old fractures of the left tibia and fibula with extensive callus formation and bony bridging as well as extensive dystrophic calcifications. Similar to previous.   2.  Distal medial soft tissue wounds with ill-defined superficial in the soft tissue thickening and fluid collections suggestive of phlegmon. No organized abscess identified.   3.  No definite osteomyelitis.   4.  Arthritic changes.        VASCULAR STUDIES: No results found.    LAST  WOUND CULTURE:   Lab Results   Component Value Date/Time    CULTRSULT Light growth mixed enteric ade. 02/26/2023 10:29 PM      PATHOLOGY  2/17/2023-bone fragment extracted from left lower extremity wound\\  FINAL DIAGNOSIS:     A. Left leg bone fragment at base of chronic wound:          Extensively degenerated fibrocartilaginous tissue with a rim of           fibrinopurulent debris          Correlate with culture findings            Comment: While no residual intact bone is identified, these           findings are suggestive of adjacent osteomyelitis.      ASSESSMENT AND PLAN:     1. Sacral decubitus ulcer, stage IV ([facility])  Comments: Ulcer first noted in early April 2022 as small open area which quickly enlarged.  This ulcer is present distal from previous sacral ulcer which healed after surgery in January.  Patient has history of flap reconstruction x2 to this area.  CT shows progressive destruction of distal sacrum and proximal  coccyx.    3/31/2023: Wound area about the same.  Tissue quality slowly improving.  No evidence of infection  -Excisional debridement of wound in clinic today, medically necessary to promote wound healing.  -Patient to return to clinic weekly for assessment and debridement  -Home health to see patient 2 times per week in between clinic visits  - Patient does not currently have follow up with Dr. Saini. If wound deteriorates or fails to improve can consider re-establishing with Dr. Saini for evaluation for coverage options.  -Patient to continue offloading sacral area as much as possible  -Roho cushion in wheelchair     Wound care: Hydrofiber silver strip, hydrofiber silver, Mepilex    2. Postoperative wound dehiscence, subsequent encounter  Comments: On 4/26 patient underwent irrigation and debridement of multiple compartments of the left lower extremity states for pressure injury, with bone excision, and complex closure of chronic wound using biologic skin substitute.  During postop visit in surgeons office on 5/11, surgical site was noted to be dehisced.  Patient was referred to wound clinic for management.    3/31/2023: Wound has improved since last assessment, area has decreased.  However, tissue is friable, at risk for deterioration.    - Tissue removed from wound on 2/17 was found to be extensively degenerated fibrocartilaginous tissue with rim of fibropurulent debris which is suggestive of adjacent OM.  - Xray on 3/7 demonstrated no acute pathology and only old fracture and deformity. Did not suggest acute OM.  -Patient does not wish to consider surgical options at this time.  -Patient to return to clinic weekly for assessment and debridement as needed  -Home health to change dressing 1-2 times per week in between clinic visits     Wound care: Hydrofera Blue, Mepilex    3. Deep tissue injury  4. Pressure injury of left leg, unstageable ([facility])  Comments: DTI to posterior left lower leg first observed in  clinic 7/29/2022.  Patient does not know how it started, had been wearing heel float boot consistently.    3/31/2023: Area of DTI to posterior leg remains stable.  New area of DTI noted to left plantar/lateral foot, possibly caused by seam of PRAFO boot  -Continue to monitor for any deterioration in tissue quality  -Patient is currently wearing Prevalon boots to help prevent pressure injuries to bilateral lower extremities    Wound care: Mepilex, Tubigrip D    5. Pressure injury of left heel, stage 3 ([facility])  Comments: First noted in clinic on 10/21/2022, originally noted to be stage II    3/31/2023: Nearly resolved, covered with thin layer of epithelium, scant drainage  - No debridment required in clinic today  -Patient to use Prevalon boots to both feet.  Discontinue PRAFO boot to left foot due to concerns for new DTI to plantar foot     Wound Care: Hydrofiber Silver, Heel Mepilex to reduce pressure and friction    6. Quadriplegia, C5-C7, complete ([facility])  Comments: Complicating factor.  Impaired mobility and sensation  -Patient is still spending 7-8 hours/day up in his wheelchair, though he does have appropriate offloading cushion, and knows to reposition frequently.  Wears heel float boots bilaterally at all times    My total time spent caring for the patient on the day of the encounter was 20 minutes.   This does not include time spent on separately billable procedures/tests.         Please note that this note may have been created using voice recognition software. I have worked with technical experts from [facility], [facility] to optimize the interface.  I have made every reasonable attempt to correct obvious errors, but there may be errors of grammar and possibly content that I did not discover before finalizing the note.

## 2023-03-31 NOTE — PATIENT INSTRUCTIONS
-Keep dressings clean and dry. Change dressings every 3-4 days, and if they become over saturated, soiled or fall off.     -Remove your compression garments if you have severe pain, severe swelling, numbness, color change, or temperature change in your toes. If you need to remove your compression garments, do so by unrolling them. Do not cut the compression garments off, this is to prevent cutting yourself on accident.    -Should you experience any significant changes in your wound(s), such as signs of infection (increasing redness, swelling, localized heat, increased pain, fever > 101 F, chills) or have any questions regarding your home care instructions, please contact the wound center at [phone]. If after hours, contact your primary care physician or go to the hospital emergency room.     -If you are 5 or more minutes late for an appointment, we reserve the right to cancel and reschedule that appointment. Additionally, if you are habitually late or not showing (3 late cancellations and/or no shows), we reserve the right to cancel your remaining appointments and it will be your responsibility to obtain a new referral if services are still needed.

## 2023-04-07 ENCOUNTER — OFFICE VISIT (OUTPATIENT)
Dept: WOUND CARE | Facility: MEDICAL CENTER | Age: 73
End: 2023-04-07
Attending: INTERNAL MEDICINE
Payer: MEDICARE

## 2023-04-07 VITALS
SYSTOLIC BLOOD PRESSURE: 107 MMHG | TEMPERATURE: 97.8 F | RESPIRATION RATE: 20 BRPM | HEART RATE: 55 BPM | DIASTOLIC BLOOD PRESSURE: 63 MMHG | OXYGEN SATURATION: 98 %

## 2023-04-07 DIAGNOSIS — L89.154 SACRAL DECUBITUS ULCER, STAGE IV (HCC): Primary | ICD-10-CM

## 2023-04-07 DIAGNOSIS — L89.893 PRESSURE INJURY OF LEFT FOOT, STAGE 3 (HCC): ICD-10-CM

## 2023-04-07 DIAGNOSIS — L89.623 PRESSURE INJURY OF LEFT HEEL, STAGE 3 (HCC): ICD-10-CM

## 2023-04-07 DIAGNOSIS — G82.54 QUADRIPLEGIA, C5-C7 INCOMPLETE (HCC): ICD-10-CM

## 2023-04-07 DIAGNOSIS — T81.31XD POSTOPERATIVE WOUND DEHISCENCE, SUBSEQUENT ENCOUNTER: ICD-10-CM

## 2023-04-07 DIAGNOSIS — T14.8XXA DEEP TISSUE INJURY: ICD-10-CM

## 2023-04-07 DIAGNOSIS — L89.890 PRESSURE INJURY OF LEFT LEG, UNSTAGEABLE (HCC): ICD-10-CM

## 2023-04-07 PROCEDURE — 11043 DBRDMT MUSC&/FSCA 1ST 20/<: CPT

## 2023-04-07 PROCEDURE — 11042 DBRDMT SUBQ TIS 1ST 20SQCM/<: CPT

## 2023-04-07 PROCEDURE — 11042 DBRDMT SUBQ TIS 1ST 20SQCM/<: CPT | Mod: 59 | Performed by: STUDENT IN AN ORGANIZED HEALTH CARE EDUCATION/TRAINING PROGRAM

## 2023-04-07 PROCEDURE — 11043 DBRDMT MUSC&/FSCA 1ST 20/<: CPT | Performed by: STUDENT IN AN ORGANIZED HEALTH CARE EDUCATION/TRAINING PROGRAM

## 2023-04-07 RX ORDER — GINSENG 100 MG
1 CAPSULE ORAL 2 TIMES DAILY
Status: ON HOLD | COMMUNITY
End: 2023-06-18

## 2023-04-07 NOTE — PROGRESS NOTES
Home health wound care orders routed to [facility] via [facility] to fax #[phone].  Patient decreased to every two weeks per provider. This was included in today's orders.

## 2023-04-07 NOTE — PROGRESS NOTES
Provider Encounter- Pressure Injury        HISTORY OF PRESENT ILLNESS  Wound History:    START OF CARE IN CLINIC: 3/3/2023 (return to clinic after hospitalization)    REFERRING PROVIDER: Sahara Mendez      WOUNDS- Pressure Injury, Sacrococcygeal, stage IV                                                             Surgical wound dehiscence, left medial lower leg-status post surgical I&D of stage IV pressure injury and           biologic application                    Pressure injury, DTI, left posterior lower leg-first observed in clinic 7/29/2022       Pressure injury stage III L heel - first noted 10/21     Right 2nd toe avulsion - first noted 10/21     Skin tag left posterior ear - first noted 10/21     HISTORY: Patient with history of incomplete quadriplegia referred to [facility] for treatment of a stage IV pressure injury.  He has a history of previous pressure injuries to this area, and underwent muscle flaps in 2019, and then again in 2020.  He was seen in the wound clinic in November 2021 for an ulcer proximal from his current ulcer, and pressure injuries to his left posterior lower leg and left heel.  At that time, it was discovered that the patient had retained VAC foam embedded in the wound bed of the sacral wound.  Attempts were made to get him back to his plastic surgeon, though unsuccessful.  In January he underwent surgical removal of VAC sponge along with excisional debridement of his sacral wound by Dr. Chaves.  After the surgery, his wound went on to heal without incident.   In early April 2022, his home health nurse noted a new sacral ulcer, below the previous ulcer which quickly tripled in size over the following weeks.  The ulcer to his left medial lower leg had also deteriorated, with bone visible at the base..  He was hospitalized from 4/22 until 4/27/2022 and underwent surgery with Dr. Raman on 4/26 for irrigation and debridement of multiple compartments of the left lower extremity, bone  excision, and complex closure of chronic wound using biologic skin substitute.   His sacrococcygeal wound was not surgically addressed during this admission.  He was discharged back to his group home, with home health, and referral to outpatient wound clinic for his sacral wound.  He was instructed to follow-up with his surgeon for his lower leg wound.       Postoperatively, the left medial lower leg incision dehisced.  He was seen by his surgeon at [facility] on 5/11.  The surgeon opted to leave remaining sutures in place, and refer him to the wound clinic for treatment of this wound.   Treatment of this wound was initiated in clinic on 5/12.  During this visit was also noted that his heel DTI had resolved, but that he had a new pressure injury to his left posterior lower extremity.     A new pressure injury was noted to patient's right upper buttock/lower back on 5/20/2022.  Wound was linear in shape, skin discolored but intact.     Abrasion noted to left anterior lower leg.  First observed in clinic on 7/22/2022.  Patient states he bumped his leg into his food tray.     Small DTI noted to patient's left lateral lower leg on 7/29/2022.  Skin intact but discolored.     Large area of deep tissue injury noted to patient's left exterior lower leg.  Patient denied any trauma to this area.  Skin intact.  Wound documented.    1/27/2023: Patient was admitted to [facility] from 1/23-1/25/2023 with gross hematuria. He underwent RICHARD which showed watchman device was in place and he was taken off of Xarelto. While hospitalized wound team was consulted. He was referred back to [facility] and home health upon discharge.    Patient was hospitalized at [facility] for pyelonephritis from 2/26 until 3/2/2023, admitted for fever and general malaise.  He was admitted and initially started on linezolid and meropenem for suspected UTI and history of multidrug-resistant organisms.  Urine cultures were negative. ID was consulted, recommended CT of chest and  "abdomen,which were negative for acute findings. However, he was treated with 5 days total course of antibiotics for suspected UTI, and symptoms completely resolved.  During this admission, the inpatient wound team was consulted for treatment of his sacral and lower leg wounds.  A wound culture was taken from his left heel pressure ulcer, negative.  Once stabilized, he was discharged home and referred back to [facility] to resume treatment of his wounds.    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  Never used smokeless tobacco    Patient's problem list, allergies, and current medications reviewed and updated in Epic    Interval History:  Interval History thinned 7/29/2022.  Please see previous notes for complete interval history.     Interval History thinned 1/27/2023. Please see previous note for complete interval history.    Interval History thinned 3/3/2023.  Please see previous notes for complete interval history.      3/3/2023 : Clinic visit with ADILSON Arthur, FNPEGGY-BC, CWDINESHN, CFTRACI.   Patient returns to clinic after hospitalization for UTI.  He states that overall he is feeling well, denies fevers, chills, nausea, vomiting, cough or shortness of breath.    Per last wound clinic note, a loose bone fragment was extracted from the wound bed of his left medial leg wound and sent for pathology.  Histology report described, \"extensively degenerated fibrocartilaginous tissue\", suggestive of adjacent osteomyelitis.  X-ray of tib-fib ordered to assess for OM.    3/10/2023: Clinic visit with Steve Hodges MD. Patient reports feeling in normal state of health. He obtained Xray left tib-fib, " we do not have images, but report states no evidence of acute pathology such as OM. We discussed options for more aggressive treatment, patient would prefer to watch and wait. Left heel remains open since hospitalization despite using Prevalon boots, will prescribe Prafo boot for offloading. Sacrum measures slightly smaller, does not appear significantly changed.    3/24/2023: Clinic visit with Steve Hodges MD. Patient reports feeling well. He obtained PRAFO boot and left heel wound smaller. Rest of wounds are not significantly changed. New linear friction wound buttocks. Patient reports occurred when transitioning from laying to sitting with his low airloss mattress.    3/31/2023 : Clinic visit with Kelly Sandy, ADILSON, FNP-BC, CWDINESHN, CFCN.   Anjum presents today complaining of cold symptoms.  States he has had an upper respiratory illness for a couple of weeks now but feels he is getting better.  He does have a new tender area to his plantar foot with mild discoloration, possibly caused by seam on insole to PRAFO boot.  He has stopped wearing the PRAFO and is now wearing Prevalon boot, and feels this is improving.  The left medial lower leg wound does show some improvement, with decrease in area.  Sacral wound is about the same.  The left heel ulcer is again almost healed, with thin layer of epithelium noted to wound bed, scant drainage.    4/7/2023: Clinic visit with Steve Hodges MD. Patient reports doing well. Excited for seeing family for Lam[place] and SO is coming to visit. Patient denies any symptoms of infection. His left medial leg wound is covered with thin, fragile epithelium and boggy due to poor quality of underlying tissue rather than abscess or fluid collection. Left plantar foot has opened slightly but appears improving, left heel is smaller. He continues to use Prevalon boots. Patient reports new abrasion to lateral right knee from rubbing on the dashboard, does not appear open. Home health has  been applying foam dressing for padding.      REVIEW OF SYSTEMS:   Unchanged from previous wound clinic assessment on 3/31/2023, except as noted in interval history above     PHYSICAL EXAMINATION:   /63   Pulse (!) 55   Temp 36.6 °C (97.8 °F)   Resp 20   SpO2 98% Comment: RA  Physical Exam  Constitutional:       Appearance: He is obese.   Cardiovascular:      Rate and Rhythm: Normal rate.   Pulmonary:      Effort: Pulmonary effort is normal.   Abdominal:      Comments: Colostomy left lower quadrant   Genitourinary:     Comments: Suprapubic catheter  Skin:     Comments: Stage IV coccygeal pressure injury - Measures about the same. Some granulation tissue at base with thin slough.    Full-thickness wound to left medial lower leg - Wound has epithelialized with fragile epithelium. Tissue is boggy under epithelium. High risk of reopening.    Stage III pressure injury left posterior heel - Measuring smaller  Stage III pressure injury plantar foot - slightly opened today in clinic, new epithelium is forming  Abrasion right lateral knee - Reports that knee hits against dashboard while driving, is closed.    Refer to wound flowsheet and photos   Neurological:      Mental Status: He is alert and oriented to person, place, and time.   Psychiatric:         Mood and Affect: Mood normal.       WOUND ASSESSMENT  Wound 02/27/23 Pressure Injury Coccyx Stage IV (Active)   Wound Image    04/07/23 1600   Site Assessment Pink;Red;Yellow 04/07/23 1600   Periwound Assessment Fragile;Scar tissue 04/07/23 1600   Margins Unattached edges 04/07/23 1600   Drainage Amount Large 04/07/23 1600   Drainage Description Serosanguineous 04/07/23 1600   Treatments Cleansed;Provider debridement;Site care 04/07/23 1600   Wound Cleansing Normal Saline Irrigation 04/07/23 1600   Periwound Protectant Skin Protectant Wipes to Periwound;Barrier Paste 04/07/23 1600   Dressing Cleansing/Solutions Not Applicable 04/07/23 1600   Dressing Options  Hydrofiber Silver Strip;Hydrofiber Silver;Other (Comments) 04/07/23 1600   Dressing Change/Treatment Frequency Monday, Wednesday, Friday, and As Needed 03/31/23 1527   WOUND NURSE ONLY - Pressure Injury Stage 4 04/07/23 1600   Wound Length (cm) 1 cm 04/07/23 1600   Wound Width (cm) 1.9 cm 04/07/23 1600   Wound Depth (cm) 0.9 cm 04/07/23 1600   Wound Surface Area (cm^2) 1.9 cm^2 04/07/23 1600   Wound Volume (cm^3) 1.71 cm^3 04/07/23 1600   Post-Procedure Length (cm) 1.1 cm 04/07/23 1600   Post-Procedure Width (cm) 1.9 cm 04/07/23 1600   Post-Procedure Depth (cm) 0.9 cm 04/07/23 1600   Post-Procedure Surface Area (cm^2) 2.09 cm^2 04/07/23 1600   Post-Procedure Volume (cm^3) 1.881 cm^3 04/07/23 1600   Wound Healing % 53 04/07/23 1600   Tunneling (cm) 1.1 cm 04/07/23 1600   Tunneling Clock Position of Wound 12 04/07/23 1600   Undermining (cm) 0 cm 03/10/23 1400   Wound Odor None 04/07/23 1600   Exposed Structures None 04/07/23 1600   Number of days: 39       Wound 02/27/23 Heel Posterior Left (Active)   Wound Image    04/07/23 1600   Site Assessment Red;Pink;Purple 04/07/23 1600   Periwound Assessment Blanchable erythema;Fragile;Scar tissue 04/07/23 1600   Margins Attached edges 04/07/23 1600   Drainage Amount Moderate 04/07/23 1600   Drainage Description Serosanguineous 04/07/23 1600   Treatments Cleansed;Provider debridement;Site care 04/07/23 1600   Wound Cleansing Normal Saline Irrigation 04/07/23 1600   Periwound Protectant Skin Protectant Wipes to Periwound;Barrier Paste 04/07/23 1600   Dressing Cleansing/Solutions Not Applicable 04/07/23 1600   Dressing Options Hydrofiber Silver;Other (Comments);Tubigrip 04/07/23 1600   Dressing Change/Treatment Frequency Monday, Wednesday, Friday, and As Needed 03/31/23 1527   WOUND NURSE ONLY - Pressure Injury Stage 3 04/07/23 1600   Wound Length (cm) 0.5 cm 04/07/23 1600   Wound Width (cm) 0.6 cm 04/07/23 1600   Wound Depth (cm) 0.1 cm 04/07/23 1600   Wound Surface Area (cm^2)  0.3 cm^2 04/07/23 1600   Wound Volume (cm^3) 0.03 cm^3 04/07/23 1600   Post-Procedure Length (cm) 0.6 cm 04/07/23 1600   Post-Procedure Width (cm) 0.6 cm 04/07/23 1600   Post-Procedure Depth (cm) 0.1 cm 04/07/23 1600   Post-Procedure Surface Area (cm^2) 0.36 cm^2 04/07/23 1600   Post-Procedure Volume (cm^3) 0.036 cm^3 04/07/23 1600   Wound Healing % 97 04/07/23 1600   Tunneling (cm) 0 cm 04/07/23 1600   Undermining (cm) 0 cm 04/07/23 1600   Wound Odor None 04/07/23 1600   Exposed Structures None 04/07/23 1600   Number of days: 39       Wound 03/03/23 Full Thickness Wound Leg LLE medial (Active)   Wound Image    04/07/23 1600   Site Assessment Boggy;Epithelialization 04/07/23 1600   Periwound Assessment Purple;Scar tissue 04/07/23 1600   Margins Attached edges 03/31/23 1527   Drainage Amount Small 04/07/23 1600   Drainage Description Serosanguineous 04/07/23 1600   Treatments Cleansed;Site care 04/07/23 1600   Wound Cleansing Normal Saline Irrigation 04/07/23 1600   Periwound Protectant Skin Protectant Wipes to Periwound;Barrier Paste 04/07/23 1600   Dressing Cleansing/Solutions Not Applicable 04/07/23 1600   Dressing Options Hydrofiber Silver;Silicone Adhesive Foam;Other (Comments);Tubigrip 04/07/23 1600   Dressing Change/Treatment Frequency Monday, Wednesday, Friday, and As Needed 03/31/23 1527   Non-staged Wound Description Full thickness 04/07/23 1600   Wound Length (cm) 1 cm 03/31/23 1527   Wound Width (cm) 0.5 cm 03/31/23 1527   Wound Depth (cm) 0.1 cm 03/31/23 1527   Wound Surface Area (cm^2) 0.5 cm^2 03/31/23 1527   Wound Volume (cm^3) 0.05 cm^3 03/31/23 1527   Post-Procedure Length (cm) 1 cm 03/31/23 1527   Post-Procedure Width (cm) 0.5 cm 03/31/23 1527   Post-Procedure Depth (cm) 0.2 cm 03/31/23 1527   Post-Procedure Surface Area (cm^2) 0.5 cm^2 03/31/23 1527   Post-Procedure Volume (cm^3) 0.1 cm^3 03/31/23 1527   Wound Healing % 83 03/31/23 1527   Tunneling (cm) 0 cm 04/07/23 1600   Undermining (cm) 0 cm  04/07/23 1600   Wound Odor None 04/07/23 1600   Exposed Structures None 04/07/23 1600   Number of days: 35       Wound 03/31/23 Pressure Injury Foot Plantar;Distal Left (Active)   Wound Image    04/07/23 1600   Site Assessment Purple;Red 04/07/23 1600   Periwound Assessment Maceration 04/07/23 1600   Margins Attached edges 04/07/23 1600   Drainage Amount Moderate 04/07/23 1600   Drainage Description Serosanguineous 04/07/23 1600   Treatments Cleansed;Provider debridement 04/07/23 1600   Wound Cleansing Normal Saline Irrigation 04/07/23 1600   Periwound Protectant Skin Protectant Wipes to Periwound;Barrier Paste 04/07/23 1600   Dressing Cleansing/Solutions Not Applicable 04/07/23 1600   Dressing Options Hydrofiber Silver;Silicone Adhesive Foam;Tubigrip;Other (Comments) 04/07/23 1600   WOUND NURSE ONLY - Pressure Injury Stage 3 04/07/23 1600   Wound Length (cm) 0.6 cm 04/07/23 1600   Wound Width (cm) 0.9 cm 04/07/23 1600   Wound Depth (cm) 0.1 cm 04/07/23 1600   Wound Surface Area (cm^2) 0.54 cm^2 04/07/23 1600   Wound Volume (cm^3) 0.054 cm^3 04/07/23 1600   Post-Procedure Length (cm) 0.6 cm 04/07/23 1600   Post-Procedure Width (cm) 1 cm 04/07/23 1600   Post-Procedure Depth (cm) 0.1 cm 04/07/23 1600   Post-Procedure Surface Area (cm^2) 0.6 cm^2 04/07/23 1600   Post-Procedure Volume (cm^3) 0.06 cm^3 04/07/23 1600   Tunneling (cm) 0 cm 04/07/23 1600   Undermining (cm) 0 cm 04/07/23 1600   Wound Odor None 04/07/23 1600   Exposed Structures None 04/07/23 1600   Number of days: 7       PROCEDURE: Excisional debridement of sacral wound  -2% viscous lidocaine applied topically to wound bed for approximately 5 minutes prior to debridement  -Curette used to debride wound bed.  Excisional debridement was performed to remove devitalized tissue until healthy, bleeding tissue was visualized.   Entire surface of wound, 2.09 cm² debrided.  Tissue debrided into the muscle layer.    -Bleeding controlled with manual pressure.     -Wound care completed by wound RN, refer to flowsheet  -Patient tolerated the procedure well, without c/o pain or discomfort.      PROCEDURE: Excisional debridement of left heel and left foot plantar wound  -2% viscous lidocaine applied topically to wound bed for approximately 5 minutes prior to debridement  -Curette used to debride wound bed.  Excisional debridement was performed to remove devitalized tissue until healthy, bleeding tissue was visualized.   Entire surface of wound, 0.96 cm² debrided.  Tissue debrided into the subcutaneous layer.    -Bleeding controlled with manual pressure.    -Wound care completed by wound RN, refer to flowsheet  -Patient tolerated the procedure well, without c/o pain or discomfort.        BIOLOGIC LOG  5/20/2022-first application.  PRODUCT: EpiCord 2 x 3 cm . PRODUCT #[id number]; EXPIRES: 2026-12-01 5/27/2022- second application.  PRODUCT: EpiCordEX 2 x 3 cm . PRODUCT #[id number]; EXPIRES: 2026-09-01    6/3/2022- third application.  PRODUCT: EpiCordEX 2 x 3 cm . PRODUCT #[id number]; EXPIRES: 2026-10-01    6/10/2022- fourth application.  PRODUCT: EpiCordEX 2 x 3 cm . PRODUCT #EX [id number]; EXPIRES: 2026-09-01 6/17/2022- fifth application.  PRODUCT: EpiCordEX 2 x 3 cm . PRODUCT #EX [id number]; EXPIRES: 2027-02-01 6/24/2022- sixth application.  PRODUCT: EpiCordEX 2 x 3 cm . PRODUCT #EX [id number]; EXPIRES: 2027-02-01 07/01/22: Seventh application.  Product: Epicort Dx 2.0 x 3.0 cm reorder number [id number]; product number [id number]; expires 2027-02-01 7/22/2022- eighth application.  PRODUCT: EpiCord 2 x 3 cm, reorder #[id number]. PRODUCT #[id number]; EXPIRES: 2027-02-01      Pertinent Labs and Diagnostics:    Labs:     A1c: No results found for: HBA1C     [facility] results, 7/1/2022 (under media tab)    CRP 13    ESR 31      IMAGING:     10/3/2022-CT of pelvis with contrast  IMPRESSION:     1.  Posterior soft tissue  sacral wound and 1.5 x 3.7 cm abscess.   2.  Progressive destruction of the distal sacrum and proximal coccyx. Sclerosis and irregularity of the distal sacrum consistent with osteomyelitis.   3.  Old wound within the soft tissues posterior to the distal left SI joint with possible fistulous communication.    10/3/2022-CT of tib-fib with and without contrast, with reconstruction  IMPRESSION:     1.  Old fractures of the left tibia and fibula with extensive callus formation and bony bridging as well as extensive dystrophic calcifications. Similar to previous.   2.  Distal medial soft tissue wounds with ill-defined superficial in the soft tissue thickening and fluid collections suggestive of phlegmon. No organized abscess identified.   3.  No definite osteomyelitis.   4.  Arthritic changes.        VASCULAR STUDIES: No results found.    LAST  WOUND CULTURE:   Lab Results   Component Value Date/Time    CULTRSULT Light growth mixed enteric ade. 02/26/2023 10:29 PM      PATHOLOGY  2/17/2023-bone fragment extracted from left lower extremity wound\\  FINAL DIAGNOSIS:     A. Left leg bone fragment at base of chronic wound:          Extensively degenerated fibrocartilaginous tissue with a rim of           fibrinopurulent debris          Correlate with culture findings            Comment: While no residual intact bone is identified, these           findings are suggestive of adjacent osteomyelitis.      ASSESSMENT AND PLAN:     1. Sacral decubitus ulcer, stage IV ([facility])  Comments: Ulcer first noted in early April 2022 as small open area which quickly enlarged.  This ulcer is present distal from previous sacral ulcer which healed after surgery in January.  Patient has history of flap reconstruction x2 to this area.  CT shows progressive destruction of distal sacrum and proximal coccyx.    4/7/2023: Wound opening measuring smaller, undermining unchanged some granular tissue with thin slough.  -Excisional debridement of wound in  clinic today, medically necessary to promote wound healing.  -Patient to return to clinic every 2 weeks for assessment and debridement  -Home health to see patient 2 times per week in between clinic visits, will see 3x per week on weeks without clinic visit.  - Patient does not currently have follow up with Dr. Saini. If wound deteriorates or fails to improve can consider re-establishing with Dr. Saini for evaluation for coverage options, however patient does not particularly want to undergo surgery and is happy with conservative approach.  -Patient to continue offloading sacral area as much as possible  -Oc cushion in wheelchair     Wound care: Hydrofiber silver strip, hydrofiber silver, Mepilex    2. Postoperative wound dehiscence, subsequent encounter  Comments: On 4/26 patient underwent irrigation and debridement of multiple compartments of the left lower extremity states for pressure injury, with bone excision, and complex closure of chronic wound using biologic skin substitute.  During postop visit in surgeons office on 5/11, surgical site was noted to be dehisced.  Patient was referred to wound clinic for management.    4/7/2023: Wound has fully epithelialized, though is boggy and high risk of reopening.  - Tissue removed from wound on 2/17 was found to be extensively degenerated fibrocartilaginous tissue with rim of fibropurulent debris which is suggestive of adjacent OM.  - Xray on 3/7 demonstrated no acute pathology and only old fracture and deformity. Did not suggest acute OM.  -Patient does not wish to consider surgical options at this time.  -Patient to return to clinic weekly for assessment and debridement as needed  Wound care: Allevyn, tubigrip    3. Deep tissue injury  4. Pressure injury of left leg, unstageable ([facility])  5. Pressure injury of left foot  Comments: DTI to posterior left lower leg first observed in clinic 7/29/2022.  Patient does not know how it started, had been wearing heel float  boot consistently.    3/31/2023: Area of DTI to posterior leg remains stable.    - DTI noted left plantar foot suspected to be from PRAFO has opened but appears to be forming new epithelium  -Continue to monitor for any deterioration in tissue quality  -Patient is currently wearing Prevalon boots to help prevent pressure injuries to bilateral lower extremities    Wound care: Hydrofiber silver, Mepilex, Tubigrip D    6. Pressure injury of left heel, stage 3 ([facility])  Comments: First noted in clinic on 10/21/2022, originally noted to be stage II    3/31/2023: Improving, superficial  - Excisional debridement was performed in clinic, medically necessary to promote wound healing.  -Patient to use Prevalon boots to both feet.  Discontinue PRAFO boot to left foot due to concerns for new DTI to plantar foot     Wound Care: Hydrofiber Silver, Heel Mepilex to reduce pressure and friction    7. Quadriplegia, C5-C7, incomplete ([facility])  Comments: Complicating factor.  Impaired mobility and sensation  -Patient is still spending 7-8 hours/day up in his wheelchair, though he does have appropriate offloading cushion, and knows to reposition frequently.  Wears heel float boots bilaterally at all times        Please note that this note may have been created using voice recognition software. I have worked with technical experts from [employer] to optimize the interface.  I have made every reasonable attempt to correct obvious errors, but there may be errors of grammar and possibly content that I did not discover before finalizing the note.

## 2023-04-13 ENCOUNTER — HOSPITAL ENCOUNTER (OUTPATIENT)
Facility: MEDICAL CENTER | Age: 73
End: 2023-04-13
Attending: PHYSICIAN ASSISTANT
Payer: MEDICARE

## 2023-04-13 PROCEDURE — 87086 URINE CULTURE/COLONY COUNT: CPT

## 2023-04-13 PROCEDURE — 87077 CULTURE AEROBIC IDENTIFY: CPT

## 2023-04-13 PROCEDURE — 87186 SC STD MICRODIL/AGAR DIL: CPT | Mod: 91

## 2023-04-14 ENCOUNTER — APPOINTMENT (OUTPATIENT)
Dept: WOUND CARE | Facility: MEDICAL CENTER | Age: 73
End: 2023-04-14
Attending: INTERNAL MEDICINE
Payer: MEDICARE

## 2023-04-15 ENCOUNTER — APPOINTMENT (OUTPATIENT)
Dept: RADIOLOGY | Facility: MEDICAL CENTER | Age: 73
End: 2023-04-15
Attending: EMERGENCY MEDICINE
Payer: MEDICARE

## 2023-04-15 ENCOUNTER — HOSPITAL ENCOUNTER (EMERGENCY)
Facility: MEDICAL CENTER | Age: 73
End: 2023-04-15
Attending: EMERGENCY MEDICINE
Payer: MEDICARE

## 2023-04-15 VITALS
BODY MASS INDEX: 31.78 KG/M2 | OXYGEN SATURATION: 98 % | DIASTOLIC BLOOD PRESSURE: 63 MMHG | WEIGHT: 222 LBS | TEMPERATURE: 98.7 F | HEIGHT: 70 IN | RESPIRATION RATE: 16 BRPM | SYSTOLIC BLOOD PRESSURE: 111 MMHG | HEART RATE: 67 BPM

## 2023-04-15 DIAGNOSIS — N30.00 ACUTE CYSTITIS WITHOUT HEMATURIA: ICD-10-CM

## 2023-04-15 LAB
ALBUMIN SERPL BCP-MCNC: 3.6 G/DL (ref 3.2–4.9)
ALBUMIN/GLOB SERPL: 1.1 G/DL
ALP SERPL-CCNC: 65 U/L (ref 30–99)
ALT SERPL-CCNC: 8 U/L (ref 2–50)
ANION GAP SERPL CALC-SCNC: 11 MMOL/L (ref 7–16)
APPEARANCE UR: ABNORMAL
AST SERPL-CCNC: 11 U/L (ref 12–45)
BACTERIA #/AREA URNS HPF: ABNORMAL /HPF
BASOPHILS # BLD AUTO: 0.2 % (ref 0–1.8)
BASOPHILS # BLD: 0.03 K/UL (ref 0–0.12)
BILIRUB SERPL-MCNC: 0.7 MG/DL (ref 0.1–1.5)
BILIRUB UR QL STRIP.AUTO: NEGATIVE
BLOOD CULTURE HOLD CXBCH: NORMAL
BUN SERPL-MCNC: 10 MG/DL (ref 8–22)
CALCIUM ALBUM COR SERPL-MCNC: 9.2 MG/DL (ref 8.5–10.5)
CALCIUM SERPL-MCNC: 8.9 MG/DL (ref 8.4–10.2)
CHLORIDE SERPL-SCNC: 102 MMOL/L (ref 96–112)
CO2 SERPL-SCNC: 23 MMOL/L (ref 20–33)
COLOR UR: YELLOW
CREAT SERPL-MCNC: 0.66 MG/DL (ref 0.5–1.4)
EOSINOPHIL # BLD AUTO: 0.08 K/UL (ref 0–0.51)
EOSINOPHIL NFR BLD: 0.6 % (ref 0–6.9)
ERYTHROCYTE [DISTWIDTH] IN BLOOD BY AUTOMATED COUNT: 53.3 FL (ref 35.9–50)
GFR SERPLBLD CREATININE-BSD FMLA CKD-EPI: 99 ML/MIN/1.73 M 2
GLOBULIN SER CALC-MCNC: 3.4 G/DL (ref 1.9–3.5)
GLUCOSE SERPL-MCNC: 105 MG/DL (ref 65–99)
GLUCOSE UR STRIP.AUTO-MCNC: NEGATIVE MG/DL
HCT VFR BLD AUTO: 39 % (ref 42–52)
HGB BLD-MCNC: 13.1 G/DL (ref 14–18)
IMM GRANULOCYTES # BLD AUTO: 0.18 K/UL (ref 0–0.11)
IMM GRANULOCYTES NFR BLD AUTO: 1.5 % (ref 0–0.9)
KETONES UR STRIP.AUTO-MCNC: 15 MG/DL
LACTATE SERPL-SCNC: 0.9 MMOL/L (ref 0.5–2)
LEUKOCYTE ESTERASE UR QL STRIP.AUTO: ABNORMAL
LYMPHOCYTES # BLD AUTO: 0.78 K/UL (ref 1–4.8)
LYMPHOCYTES NFR BLD: 6.3 % (ref 22–41)
MCH RBC QN AUTO: 34.5 PG (ref 27–33)
MCHC RBC AUTO-ENTMCNC: 33.6 G/DL (ref 33.7–35.3)
MCV RBC AUTO: 102.6 FL (ref 81.4–97.8)
MICRO URNS: ABNORMAL
MONOCYTES # BLD AUTO: 1.35 K/UL (ref 0–0.85)
MONOCYTES NFR BLD AUTO: 11 % (ref 0–13.4)
NEUTROPHILS # BLD AUTO: 9.89 K/UL (ref 1.82–7.42)
NEUTROPHILS NFR BLD: 80.4 % (ref 44–72)
NITRITE UR QL STRIP.AUTO: POSITIVE
NRBC # BLD AUTO: 0 K/UL
NRBC BLD-RTO: 0 /100 WBC
PH UR STRIP.AUTO: >=9 [PH] (ref 5–8)
PLATELET # BLD AUTO: 116 K/UL (ref 164–446)
PMV BLD AUTO: 8.6 FL (ref 9–12.9)
POTASSIUM SERPL-SCNC: 3.6 MMOL/L (ref 3.6–5.5)
PROT SERPL-MCNC: 7 G/DL (ref 6–8.2)
PROT UR QL STRIP: 100 MG/DL
RBC # BLD AUTO: 3.8 M/UL (ref 4.7–6.1)
RBC # URNS HPF: ABNORMAL /HPF
RBC UR QL AUTO: ABNORMAL
SODIUM SERPL-SCNC: 136 MMOL/L (ref 135–145)
SP GR UR STRIP.AUTO: 1.01
UNIDENT CRYS URNS QL MICRO: ABNORMAL /HPF
WBC # BLD AUTO: 12.3 K/UL (ref 4.8–10.8)
WBC #/AREA URNS HPF: ABNORMAL /HPF

## 2023-04-15 PROCEDURE — 83605 ASSAY OF LACTIC ACID: CPT

## 2023-04-15 PROCEDURE — 71045 X-RAY EXAM CHEST 1 VIEW: CPT

## 2023-04-15 PROCEDURE — 99284 EMERGENCY DEPT VISIT MOD MDM: CPT

## 2023-04-15 PROCEDURE — 700101 HCHG RX REV CODE 250: Performed by: EMERGENCY MEDICINE

## 2023-04-15 PROCEDURE — 80053 COMPREHEN METABOLIC PANEL: CPT

## 2023-04-15 PROCEDURE — 36415 COLL VENOUS BLD VENIPUNCTURE: CPT

## 2023-04-15 PROCEDURE — 85025 COMPLETE CBC W/AUTO DIFF WBC: CPT

## 2023-04-15 PROCEDURE — 87086 URINE CULTURE/COLONY COUNT: CPT

## 2023-04-15 PROCEDURE — 81001 URINALYSIS AUTO W/SCOPE: CPT

## 2023-04-15 RX ORDER — GRANULES FOR ORAL 3 G/1
3 POWDER ORAL ONCE
Status: COMPLETED | OUTPATIENT
Start: 2023-04-15 | End: 2023-04-15

## 2023-04-15 RX ADMIN — FOSFOMYCIN TROMETHAMINE 3 G: 3 GRANULE, FOR SOLUTION ORAL at 17:46

## 2023-04-15 ASSESSMENT — FIBROSIS 4 INDEX: FIB4 SCORE: 2.22

## 2023-04-15 NOTE — ED PROVIDER NOTES
ED Provider Note    Primary care provider: KATE Pretty    CHIEF COMPLAINT  Chief Complaint   Patient presents with    Fever     Tmax 101.6f at home. Thinks he may have uti. Pt is paraplegic and has supra pubic cath for past 2 years. Pt took 1000mg tylenol at noon. Pt endorses some rigors the past 2 nights. Pt states new caregiver at group home was cleaning suprapubic site 1-2 weeks ago and caused wound has been on abx ointment since then. NP states that she was unhappy with the appearance of the site. Denies any change in urine output.          HPI  Osvaldo Pate is a 72 y.o. male who presents to the Emergency Department for fevers.  About 4 days ago the patient had some shaking chills.  The patient did not have any fevers at that time.  He had follow-up with the urology clinic 2 days ago, urinalysis was sent but he has not obtained the results.  He develops fevers today with a Tmax of one 1.6.  He does not have any sensation from about nipple level down, therefore does not have any abdominal pain or back pain.  He does have a sacral decubitus ulcer that is being cared for.  He denies any new wounds or rashes.  Denies any cough, chest pain, shortness of breath, headache.  He feels that likely it is a urinalysis source.  Suprapubic catheter was changed 48 hours ago.  Ostomy output has been normal.    External Record Review: History of paraplegia, suprapubic catheter.  The patient was last seen by wound care on 4/7 for a sacral decubitus ulcer.    REVIEW OF SYSTEMS  See HPI.     PAST MEDICAL HISTORY   has a past medical history of A-fib ([facility]), Acute cystitis without hematuria (10/23/2021), Arrhythmia, Atrial flutter ([facility]), Blood clotting disorder ([facility]), Bowel habit changes, Clot hematuria (1/23/2023), GERD (gastroesophageal reflux disease), Hypertension, Kidney stones, Neurogenic bladder, Open wound (11/18/2022), Quadriplegia, C5-C7 complete ([facility]), and Suprapubic catheter ([facility]).    SURGICAL  "HISTORY   has a past surgical history that includes percutaneouospinning lower extremity; colostomy; colostomy takedown; colostomy (N/A, 2019); ulcer debridement (N/A, 2019); flap closure (2019); hernia repair; irrigation & debridement general (2020); cystoscopy,insert ureteral stent (Left, 2021); cysto/uretero/pyeloscopy, dx (Left, 2021); lasertripsy (Left, 2021); cystoscopy,insert ureteral stent (Left, 2022); cysto/uretero/pyeloscopy, dx (Left, 2022); lasertripsy (N/A, 2022); incision and drainage orthopedic (2022); incision and drainage orthopedic (Left, 2022); bone biopsy (Left, 2022); irrigation & debridement general (Left, 2022); wound closure neuro (Left, 2022); orthopedic osteotomy (Left, 2022); and orif, ankle.    SOCIAL HISTORY  Social History     Tobacco Use    Smoking status: Former     Packs/day: 1.00     Years: 10.00     Pack years: 10.00     Types: Cigarettes     Start date: 1967     Quit date: 1977     Years since quittin.3    Smokeless tobacco: Never   Vaping Use    Vaping Use: Never used   Substance Use Topics    Alcohol use: Yes     Alcohol/week: 4.2 oz     Types: 7 Standard drinks or equivalent per week     Comment: 2/day    Drug use: No      Social History     Substance and Sexual Activity   Drug Use No       FAMILY HISTORY  Family History   Problem Relation Age of Onset    Heart Disease Father        CURRENT MEDICATIONS  Reviewed.  See Encounter Summary.     ALLERGIES  Allergies   Allergen Reactions    Sulfa Drugs Rash     Rash, developed this back in  after being placed on \"sulfa antibiotic for my wound\". Antibiotic was stopped and rash went away. Patient states he had a sulfa antibiotic prior to that time back when he was younger w/o a reaction.          PHYSICAL EXAM  VITAL SIGNS: /63   Pulse 67   Temp 37.1 °C (98.7 °F) (Temporal)   Resp 16   Ht 1.778 m (5' 10\")   Wt " 101 kg (222 lb)   SpO2 98%   BMI 31.85 kg/m²   Constitutional: Awake, alert in no apparent distress.  HENT: Normocephalic, Bilateral external ears normal. Nose normal.   Eyes: Conjunctiva normal, non-icteric, EOMI.    Thorax & Lungs: Easy unlabored respirations, Clear to ascultation bilaterally.  Cardiovascular: Borderline tachycardic regular rate, Regular rhythm, No murmurs, rubs or gallops. Bilateral pulses symmetrical.   Abdomen:  Soft, nontender, nondistended, normal active bowel sounds.  Ostomy present with some dark stool in the bag.  Suprapubic catheter present, no breakdown around the site.  Sediment noted in the tube.  :    Skin: Visualized skin is  Dry, No erythema, No rash.   Musculoskeletal:   No cyanosis, clubbing or edema. No leg asymmetry.   Neurologic: Alert, Grossly non-focal.   Psychiatric: Normal affect, Normal mood  Lymphatic:        RADIOLOGY  DX-CHEST-PORTABLE (1 VIEW)   Final Result         No acute cardiac or pulmonary abnormality is identified.            COURSE & MEDICAL DECISION MAKING  Pertinent Labs & Imaging studies reviewed. (See chart for details)    Differential diagnoses include but are not limited to: Sepsis, cystitis, CAP    3:27 PM - Nursing notes reviewed, patient seen and examined at bedside.    ED Observation Status? Yes; I am placing the patient in to an observation status due to a diagnostic uncertainty as well as therapeutic intensity. Patient placed in observation status at 3:45 PM    Observation plan is as follows: Sepsis evaluation.    Upon Reevaluation, the patient's condition has: Unchanged however well enough to go home.    Patient discharged from ED Observation status at 4/15/2023 6:29 PM     Discussion of management with other medical personnel: Pharmacy    discussed the case with our clinical pharmacist, labs reviewed.  She recommends a single dose of fosfomycin.    Escalation of care considered, and ultimately not performed: acute inpatient care management,  however at this time, the patient is most appropriate for outpatient management.      Decision Making:  This is a pleasant 72 y.o. year old male who presents with fevers.  The patient has had fevers at home, no obvious source other than he suspects is at his a bladder infection as he has had these previously.  The suprapubic catheter was changed just a few days ago.  We obtained a urine specimen by disconnecting the bag and getting a line specimen from the new tubing.  It is nitrate positive, suspicious for acute urinary tract infection.  Urine culture has been sent.  Patient also does have a leukocytosis today with 80% neutrophils.  He has a prior history of ESBL.  Discussed the case with our clinical pharmacist.  He was given a single dose of fosfomycin which should clear the urinary tract infection.    Recommend to return for any persistent fevers after 48 hours, worsening symptoms or any other concern.        Discharge Medications:  Discharge Medication List as of 4/15/2023  6:00 PM          The patient was discharged home (see d/c instructions) was told to return immediately for any signs or symptoms listed, or any worsening at all.  The patient verbally agreed to the discharge precautions and follow-up plan which is documented in EPIC.        FINAL IMPRESSION  1. Acute cystitis without hematuria

## 2023-04-15 NOTE — ED NOTES
Suprapubic bag changed waiting for sample. Iv established, blood and 1st set of bc drawn and sent to lab.

## 2023-04-15 NOTE — ED TRIAGE NOTES
"Bib self for above complaints.     Chief Complaint   Patient presents with    Fever     Tmax 101.6f at home. Thinks he may have uti. Pt is paraplegic and has supra pubic cath for past 2 years. Pt took 1000mg tylenol at noon. Pt endorses some rigors the past 2 nights. Pt states new caregiver at group home was cleaning suprapubic site 1-2 weeks ago and caused wound has been on abx ointment since then. NP states that she was unhappy with the appearance of the site. Denies any change in urine output.      /77   Pulse (!) 102   Temp 37.1 °C (98.8 °F) (Oral)   Resp 16   Ht 1.778 m (5' 10\")   Wt 101 kg (222 lb)   SpO2 95%   BMI 31.85 kg/m²     "

## 2023-04-16 LAB
BACTERIA UR CULT: NORMAL
SIGNIFICANT IND 70042: NORMAL
SITE SITE: NORMAL
SOURCE SOURCE: NORMAL

## 2023-04-21 ENCOUNTER — OFFICE VISIT (OUTPATIENT)
Dept: WOUND CARE | Facility: MEDICAL CENTER | Age: 73
End: 2023-04-21
Attending: INTERNAL MEDICINE
Payer: MEDICARE

## 2023-04-21 VITALS
DIASTOLIC BLOOD PRESSURE: 65 MMHG | OXYGEN SATURATION: 94 % | RESPIRATION RATE: 18 BRPM | HEART RATE: 60 BPM | TEMPERATURE: 97.9 F | SYSTOLIC BLOOD PRESSURE: 108 MMHG

## 2023-04-21 DIAGNOSIS — L89.623 PRESSURE INJURY OF LEFT HEEL, STAGE 3 (HCC): ICD-10-CM

## 2023-04-21 DIAGNOSIS — G82.54 QUADRIPLEGIA, C5-C7 INCOMPLETE (HCC): ICD-10-CM

## 2023-04-21 DIAGNOSIS — L89.893 PRESSURE INJURY OF LEFT FOOT, STAGE 3 (HCC): ICD-10-CM

## 2023-04-21 DIAGNOSIS — L89.890 PRESSURE INJURY OF LEFT LEG, UNSTAGEABLE (HCC): ICD-10-CM

## 2023-04-21 DIAGNOSIS — T81.31XD POSTOPERATIVE WOUND DEHISCENCE, SUBSEQUENT ENCOUNTER: ICD-10-CM

## 2023-04-21 DIAGNOSIS — T14.8XXA DEEP TISSUE INJURY: ICD-10-CM

## 2023-04-21 DIAGNOSIS — L89.154 SACRAL DECUBITUS ULCER, STAGE IV (HCC): Primary | ICD-10-CM

## 2023-04-21 PROCEDURE — 11043 DBRDMT MUSC&/FSCA 1ST 20/<: CPT

## 2023-04-21 PROCEDURE — 11042 DBRDMT SUBQ TIS 1ST 20SQCM/<: CPT | Mod: 59 | Performed by: STUDENT IN AN ORGANIZED HEALTH CARE EDUCATION/TRAINING PROGRAM

## 2023-04-21 PROCEDURE — 11042 DBRDMT SUBQ TIS 1ST 20SQCM/<: CPT

## 2023-04-21 PROCEDURE — 11043 DBRDMT MUSC&/FSCA 1ST 20/<: CPT | Performed by: STUDENT IN AN ORGANIZED HEALTH CARE EDUCATION/TRAINING PROGRAM

## 2023-04-21 NOTE — PROGRESS NOTES
Provider Encounter- Pressure Injury        HISTORY OF PRESENT ILLNESS  Wound History:    START OF CARE IN CLINIC: 3/3/2023 (return to clinic after hospitalization)    REFERRING PROVIDER: Sahara Mendez      WOUNDS- Pressure Injury, Sacrococcygeal, stage IV                                                             Surgical wound dehiscence, left medial lower leg-status post surgical I&D of stage IV pressure injury and           biologic application                    Pressure injury, DTI, left posterior lower leg-first observed in clinic 7/29/2022       Pressure injury stage III L heel - first noted 10/21     Right 2nd toe avulsion - first noted 10/21     Skin tag left posterior ear - first noted 10/21     HISTORY: Patient with history of incomplete quadriplegia referred to [facility] for treatment of a stage IV pressure injury.  He has a history of previous pressure injuries to this area, and underwent muscle flaps in 2019, and then again in 2020.  He was seen in the wound clinic in November 2021 for an ulcer proximal from his current ulcer, and pressure injuries to his left posterior lower leg and left heel.  At that time, it was discovered that the patient had retained VAC foam embedded in the wound bed of the sacral wound.  Attempts were made to get him back to his plastic surgeon, though unsuccessful.  In January he underwent surgical removal of VAC sponge along with excisional debridement of his sacral wound by Dr. Chaves.  After the surgery, his wound went on to heal without incident.   In early April 2022, his home health nurse noted a new sacral ulcer, below the previous ulcer which quickly tripled in size over the following weeks.  The ulcer to his left medial lower leg had also deteriorated, with bone visible at the base..  He was hospitalized from 4/22 until 4/27/2022 and underwent surgery with Dr. Raman on 4/26 for irrigation and debridement of multiple compartments of the left lower extremity, bone  excision, and complex closure of chronic wound using biologic skin substitute.   His sacrococcygeal wound was not surgically addressed during this admission.  He was discharged back to his group home, with home health, and referral to outpatient wound clinic for his sacral wound.  He was instructed to follow-up with his surgeon for his lower leg wound.       Postoperatively, the left medial lower leg incision dehisced.  He was seen by his surgeon at [facility] on 5/11.  The surgeon opted to leave remaining sutures in place, and refer him to the wound clinic for treatment of this wound.   Treatment of this wound was initiated in clinic on 5/12.  During this visit was also noted that his heel DTI had resolved, but that he had a new pressure injury to his left posterior lower extremity.     A new pressure injury was noted to patient's right upper buttock/lower back on 5/20/2022.  Wound was linear in shape, skin discolored but intact.     Abrasion noted to left anterior lower leg.  First observed in clinic on 7/22/2022.  Patient states he bumped his leg into his food tray.     Small DTI noted to patient's left lateral lower leg on 7/29/2022.  Skin intact but discolored.     Large area of deep tissue injury noted to patient's left exterior lower leg.  Patient denied any trauma to this area.  Skin intact.  Wound documented.    1/27/2023: Patient was admitted to [facility] from 1/23-1/25/2023 with gross hematuria. He underwent RICHARD which showed watchman device was in place and he was taken off of Xarelto. While hospitalized wound team was consulted. He was referred back to [facility] and home health upon discharge.    Patient was hospitalized at [facility] for pyelonephritis from 2/26 until 3/2/2023, admitted for fever and general malaise.  He was admitted and initially started on linezolid and meropenem for suspected UTI and history of multidrug-resistant organisms.  Urine cultures were negative. ID was consulted, recommended CT of chest and  "abdomen,which were negative for acute findings. However, he was treated with 5 days total course of antibiotics for suspected UTI, and symptoms completely resolved.  During this admission, the inpatient wound team was consulted for treatment of his sacral and lower leg wounds.  A wound culture was taken from his left heel pressure ulcer, negative.  Once stabilized, he was discharged home and referred back to [facility] to resume treatment of his wounds.    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  Never used smokeless tobacco    Patient's problem list, allergies, and current medications reviewed and updated in Epic    Interval History:  Interval History thinned 7/29/2022.  Please see previous notes for complete interval history.     Interval History thinned 1/27/2023. Please see previous note for complete interval history.    Interval History thinned 3/3/2023.  Please see previous notes for complete interval history.      3/3/2023 : Clinic visit with ADILSON Arthur, FNPEGGY-BC, CWDINESHN, CFTRACI.   Patient returns to clinic after hospitalization for UTI.  He states that overall he is feeling well, denies fevers, chills, nausea, vomiting, cough or shortness of breath.    Per last wound clinic note, a loose bone fragment was extracted from the wound bed of his left medial leg wound and sent for pathology.  Histology report described, \"extensively degenerated fibrocartilaginous tissue\", suggestive of adjacent osteomyelitis.  X-ray of tib-fib ordered to assess for OM.    3/10/2023: Clinic visit with Steve Hodges MD. Patient reports feeling in normal state of health. He obtained Xray left tib-fib, " we do not have images, but report states no evidence of acute pathology such as OM. We discussed options for more aggressive treatment, patient would prefer to watch and wait. Left heel remains open since hospitalization despite using Prevalon boots, will prescribe Prafo boot for offloading. Sacrum measures slightly smaller, does not appear significantly changed.    3/24/2023: Clinic visit with Steve Hodges MD. Patient reports feeling well. He obtained PRAFO boot and left heel wound smaller. Rest of wounds are not significantly changed. New linear friction wound buttocks. Patient reports occurred when transitioning from laying to sitting with his low airloss mattress.    3/31/2023 : Clinic visit with Kelly Sandy, ADILSON, FNP-BC, CWDINESHN, CFCN.   Anjum presents today complaining of cold symptoms.  States he has had an upper respiratory illness for a couple of weeks now but feels he is getting better.  He does have a new tender area to his plantar foot with mild discoloration, possibly caused by seam on insole to PRAFO boot.  He has stopped wearing the PRAFO and is now wearing Prevalon boot, and feels this is improving.  The left medial lower leg wound does show some improvement, with decrease in area.  Sacral wound is about the same.  The left heel ulcer is again almost healed, with thin layer of epithelium noted to wound bed, scant drainage.    4/7/2023: Clinic visit with Steve Hodges MD. Patient reports doing well. Excited for seeing family for Lamar and SO is coming to visit. Patient denies any symptoms of infection. His left medial leg wound is covered with thin, fragile epithelium and boggy due to poor quality of underlying tissue rather than abscess or fluid collection. Left plantar foot has opened slightly but appears improving, left heel is smaller. He continues to use Prevalon boots. Patient reports new abrasion to lateral right knee from rubbing on the dashboard, does not appear open. Home health has  been applying foam dressing for padding.    4/21/2023: Clinic visit with Steve Hodges MD. Patient reports feeling well, denies any symptoms of infection. He has been using Prevalon boots 24 hours a day, despite this, he has worsening left heel pressure injury. We discussed other methods and he will try to use pillow under leg to offload heel. We also applied an allyven dressing to plantar foot to reduce friction and pressure and he can try to use PRAFO boot. His heel does not appear to be in contact with any solid surface on his wheelchair. Other wounds are unchanged. Due to worsening left heel wound, will bring back to clinic next week for evaluation.      REVIEW OF SYSTEMS:   Unchanged from previous wound clinic assessment on 4/7/2023, except as noted in interval history above     PHYSICAL EXAMINATION:   /65   Pulse 60   Temp 36.6 °C (97.9 °F) (Temporal)   Resp 18   SpO2 94%   Physical Exam  Constitutional:       Appearance: He is obese.   Cardiovascular:      Rate and Rhythm: Normal rate.   Pulmonary:      Effort: Pulmonary effort is normal.   Abdominal:      Comments: Colostomy left lower quadrant   Genitourinary:     Comments: Suprapubic catheter  Skin:     Comments: Stage IV coccygeal pressure injury - Measures slightly larger. Some granulation tissue at base with thin slough.    Full-thickness wound to left medial lower leg - Small opening, rest has epithelialized. Tissue quality remains poor.    Stage III pressure injury left posterior heel - Measuring larger  Stage III pressure injury plantar foot - Measuring larger  Abrasion right lateral knee - Resolved    Refer to wound flowsheet and photos   Neurological:      Mental Status: He is alert and oriented to person, place, and time.   Psychiatric:         Mood and Affect: Mood normal.       WOUND ASSESSMENT  Wound 02/27/23 Pressure Injury Coccyx Stage IV (Active)   Wound Image    04/21/23 1530   Site Assessment Red;Yellow 04/21/23 1530    Periwound Assessment Fragile;Scar tissue 04/21/23 1530   Margins Unattached edges 04/21/23 1530   Drainage Amount Large 04/21/23 1530   Drainage Description Serosanguineous 04/21/23 1530   Treatments Cleansed;Provider debridement 04/21/23 1530   Wound Cleansing Normal Saline Irrigation 04/21/23 1530   Periwound Protectant Skin Protectant Wipes to Periwound;Barrier Paste 04/21/23 1530   Dressing Cleansing/Solutions Not Applicable 04/21/23 1530   Dressing Options Hydrofiber Silver Strip;Hydrofiber Silver;Other (Comments) 04/21/23 1530   Dressing Change/Treatment Frequency Monday, Wednesday, Friday, and As Needed 03/31/23 1527   WOUND NURSE ONLY - Pressure Injury Stage 4 04/21/23 1530   Wound Length (cm) 1.5 cm 04/21/23 1530   Wound Width (cm) 1.9 cm 04/21/23 1530   Wound Depth (cm) 0.8 cm 04/21/23 1530   Wound Surface Area (cm^2) 2.85 cm^2 04/21/23 1530   Wound Volume (cm^3) 2.28 cm^3 04/21/23 1530   Post-Procedure Length (cm) 1.6 cm 04/21/23 1530   Post-Procedure Width (cm) 1.9 cm 04/21/23 1530   Post-Procedure Depth (cm) 0.8 cm 04/21/23 1530   Post-Procedure Surface Area (cm^2) 3.04 cm^2 04/21/23 1530   Post-Procedure Volume (cm^3) 2.432 cm^3 04/21/23 1530   Wound Healing % 37 04/21/23 1530   Tunneling (cm) 1.9 cm 04/21/23 1530   Tunneling Clock Position of Wound 12 04/21/23 1530   Undermining (cm) 0 cm 03/10/23 1400   Wound Odor None 04/21/23 1530   Exposed Structures None 04/21/23 1530   Number of days: 54       Wound 02/27/23 Heel Posterior Left (Active)   Wound Image    04/21/23 1530   Site Assessment Pink;Red;Yellow 04/21/23 1530   Periwound Assessment Blanchable erythema;Fragile;Scar tissue 04/21/23 1530   Margins Attached edges 04/21/23 1530   Drainage Amount Moderate 04/21/23 1530   Drainage Description Serosanguineous 04/21/23 1530   Treatments Cleansed;Provider debridement 04/21/23 1530   Wound Cleansing Normal Saline Irrigation 04/21/23 1530   Periwound Protectant Skin Protectant Wipes to  Periwound;Barrier Paste 04/21/23 1530   Dressing Cleansing/Solutions Not Applicable 04/21/23 1530   Dressing Options Hydrofiber Silver;Other (Comments);Tubigrip 04/21/23 1530   Dressing Change/Treatment Frequency Monday, Wednesday, Friday, and As Needed 03/31/23 1527   WOUND NURSE ONLY - Pressure Injury Stage 3 04/21/23 1530   Wound Length (cm) 4 cm 04/21/23 1530   Wound Width (cm) 2.5 cm 04/21/23 1530   Wound Depth (cm) 0.2 cm 04/21/23 1530   Wound Surface Area (cm^2) 10 cm^2 04/21/23 1530   Wound Volume (cm^3) 2 cm^3 04/21/23 1530   Post-Procedure Length (cm) 4 cm 04/21/23 1530   Post-Procedure Width (cm) 2.6 cm 04/21/23 1530   Post-Procedure Depth (cm) 0.2 cm 04/21/23 1530   Post-Procedure Surface Area (cm^2) 10.4 cm^2 04/21/23 1530   Post-Procedure Volume (cm^3) 2.08 cm^3 04/21/23 1530   Wound Healing % -100 04/21/23 1530   Tunneling (cm) 0 cm 04/21/23 1530   Undermining (cm) 0 cm 04/21/23 1530   Wound Odor None 04/21/23 1530   Exposed Structures None 04/21/23 1530   Number of days: 54       Wound 03/03/23 Full Thickness Wound Leg LLE medial (Active)   Wound Image    04/21/23 1530   Site Assessment Boggy;Epithelialization 04/21/23 1530   Periwound Assessment Purple;Scar tissue 04/21/23 1530   Margins Attached edges 04/21/23 1530   Drainage Amount Small 04/21/23 1530   Drainage Description Serosanguineous 04/21/23 1530   Treatments Cleansed;Site care 04/21/23 1530   Wound Cleansing Normal Saline Irrigation 04/21/23 1530   Periwound Protectant Skin Protectant Wipes to Periwound;Barrier Paste 04/21/23 1530   Dressing Cleansing/Solutions Not Applicable 04/21/23 1530   Dressing Options Hydrofiber Silver;Other (Comments);Tubigrip 04/21/23 1530   Dressing Change/Treatment Frequency Monday, Wednesday, Friday, and As Needed 03/31/23 1527   Non-staged Wound Description Full thickness 04/21/23 1530   Wound Length (cm) 0.5 cm 04/21/23 1530   Wound Width (cm) 0.5 cm 04/21/23 1530   Wound Depth (cm) 0.1 cm 04/21/23 1530    Wound Surface Area (cm^2) 0.25 cm^2 04/21/23 1530   Wound Volume (cm^3) 0.025 cm^3 04/21/23 1530   Post-Procedure Length (cm) 0.5 cm 04/21/23 1530   Post-Procedure Width (cm) 0.5 cm 04/21/23 1530   Post-Procedure Depth (cm) 0.1 cm 04/21/23 1530   Post-Procedure Surface Area (cm^2) 0.25 cm^2 04/21/23 1530   Post-Procedure Volume (cm^3) 0.025 cm^3 04/21/23 1530   Wound Healing % 92 04/21/23 1530   Tunneling (cm) 0 cm 04/21/23 1530   Undermining (cm) 0 cm 04/21/23 1530   Wound Odor None 04/21/23 1530   Exposed Structures None 04/21/23 1530   Number of days: 50       Wound 03/31/23 Pressure Injury Foot Plantar;Distal Left (Active)   Wound Image    04/21/23 1530   Site Assessment Red;Yellow 04/21/23 1530   Periwound Assessment Fragile;Maceration 04/21/23 1530   Margins Attached edges 04/21/23 1530   Drainage Amount Moderate 04/21/23 1530   Drainage Description Serosanguineous 04/21/23 1530   Treatments Cleansed;Provider debridement 04/21/23 1530   Wound Cleansing Normal Saline Irrigation 04/21/23 1530   Periwound Protectant Skin Protectant Wipes to Periwound;Barrier Paste 04/21/23 1530   Dressing Cleansing/Solutions Not Applicable 04/21/23 1530   Dressing Options Hydrofiber Silver;Other (Comments);Tubigrip 04/21/23 1530   WOUND NURSE ONLY - Pressure Injury Stage 3 04/21/23 1530   Wound Length (cm) 1.4 cm 04/21/23 1530   Wound Width (cm) 0.7 cm 04/21/23 1530   Wound Depth (cm) 0.2 cm 04/21/23 1530   Wound Surface Area (cm^2) 0.98 cm^2 04/21/23 1530   Wound Volume (cm^3) 0.196 cm^3 04/21/23 1530   Post-Procedure Length (cm) 1.5 cm 04/21/23 1530   Post-Procedure Width (cm) 0.9 cm 04/21/23 1530   Post-Procedure Depth (cm) 0.2 cm 04/21/23 1530   Post-Procedure Surface Area (cm^2) 1.35 cm^2 04/21/23 1530   Post-Procedure Volume (cm^3) 0.27 cm^3 04/21/23 1530   Wound Healing % -263 04/21/23 1530   Tunneling (cm) 0 cm 04/21/23 1530   Undermining (cm) 0 cm 04/21/23 1530   Wound Odor None 04/21/23 1530   Exposed Structures None  04/21/23 1530   Number of days: 22       Wound 04/21/23 Full Thickness Wound Leg Anterior Left (Active)   Wound Image   04/21/23 1530   Site Assessment Red;Yellow 04/21/23 1530   Periwound Assessment Edema;Fragile 04/21/23 1530   Margins Attached edges 04/21/23 1530   Drainage Amount Small 04/21/23 1530   Drainage Description Serosanguineous 04/21/23 1530   Treatments Cleansed;Site care 04/21/23 1530   Wound Cleansing Normal Saline Irrigation 04/21/23 1530   Periwound Protectant Skin Protectant Wipes to Periwound;Barrier Paste 04/21/23 1530   Dressing Cleansing/Solutions Not Applicable 04/21/23 1530   Dressing Options Hydrofiber Silver;Silicone Adhesive Foam;Tubigrip 04/21/23 1530   Non-staged Wound Description Full thickness 04/21/23 1530   Wound Length (cm) 0.4 cm 04/21/23 1530   Wound Width (cm) 0.3 cm 04/21/23 1530   Wound Depth (cm) 0.1 cm 04/21/23 1530   Wound Surface Area (cm^2) 0.12 cm^2 04/21/23 1530   Wound Volume (cm^3) 0.012 cm^3 04/21/23 1530   Tunneling (cm) 0 cm 04/21/23 1530   Undermining (cm) 0 cm 04/21/23 1530   Wound Odor None 04/21/23 1530   Exposed Structures None 04/21/23 1530   Number of days: 1       PROCEDURE: Excisional debridement of sacral wound  -2% viscous lidocaine applied topically to wound bed for approximately 5 minutes prior to debridement  -Curette used to debride wound bed.  Excisional debridement was performed to remove devitalized tissue until healthy, bleeding tissue was visualized.   Entire surface of wound, 3.04 cm² debrided.  Tissue debrided into the muscle / fascia layer.    -Bleeding controlled with manual pressure.    -Wound care completed by wound RN, refer to flowsheet  -Patient tolerated the procedure well, without c/o pain or discomfort.      PROCEDURE: Excisional debridement of left heel and left foot plantar wound  -2% viscous lidocaine applied topically to wound bed for approximately 5 minutes prior to debridement  -Curette used to debride wound bed.  Excisional  debridement was performed to remove devitalized tissue until healthy, bleeding tissue was visualized.   Entire surface of wound, 11.75 cm² debrided.  Tissue debrided into the subcutaneous layer.    -Bleeding controlled with manual pressure.    -Wound care completed by wound RN, refer to flowsheet  -Patient tolerated the procedure well, without c/o pain or discomfort.        BIOLOGIC LOG  5/20/2022-first application.  PRODUCT: EpiCord 2 x 3 cm . PRODUCT #[id number]; EXPIRES: 2026-12-01 5/27/2022- second application.  PRODUCT: EpiCordEX 2 x 3 cm . PRODUCT #[id number]; EXPIRES: 2026-09-01    6/3/2022- third application.  PRODUCT: EpiCordEX 2 x 3 cm . PRODUCT #[id number]; EXPIRES: 2026-10-01    6/10/2022- fourth application.  PRODUCT: EpiCordEX 2 x 3 cm . PRODUCT #[id number]; EXPIRES: 2026-09-01 6/17/2022- fifth application.  PRODUCT: EpiCordEX 2 x 3 cm . PRODUCT #[id number]; EXPIRES: 2027-02-01 6/24/2022- sixth application.  PRODUCT: EpiCordEX 2 x 3 cm . PRODUCT #[id number]; EXPIRES: 2027-02-01 07/01/22: Seventh application.  Product: Epicort Dx 2.0 x 3.0 cm reorder number [id number]; product number [id number]; expires 2027-02-01 7/22/2022- eighth application.  PRODUCT: EpiCord 2 x 3 cm, reorder #[id number]. PRODUCT #[id number]; EXPIRES: 2027-02-01      Pertinent Labs and Diagnostics:    Labs:     A1c: No results found for: HBA1C     [facility] results, 7/1/2022 (under media tab)    CRP 13    ESR 31      IMAGING:     10/3/2022-CT of pelvis with contrast  IMPRESSION:     1.  Posterior soft tissue sacral wound and 1.5 x 3.7 cm abscess.   2.  Progressive destruction of the distal sacrum and proximal coccyx. Sclerosis and irregularity of the distal sacrum consistent with osteomyelitis.   3.  Old wound within the soft tissues posterior to the distal left SI joint with possible fistulous communication.    10/3/2022-CT of tib-fib with and without contrast,  with reconstruction  IMPRESSION:     1.  Old fractures of the left tibia and fibula with extensive callus formation and bony bridging as well as extensive dystrophic calcifications. Similar to previous.   2.  Distal medial soft tissue wounds with ill-defined superficial in the soft tissue thickening and fluid collections suggestive of phlegmon. No organized abscess identified.   3.  No definite osteomyelitis.   4.  Arthritic changes.        VASCULAR STUDIES: No results found.    LAST  WOUND CULTURE:   Lab Results   Component Value Date/Time    CULTRSULT Mixed enteric ade >100,000 cfu/mL 04/15/2023 04:37 PM      PATHOLOGY  2/17/2023-bone fragment extracted from left lower extremity wound\\  FINAL DIAGNOSIS:     A. Left leg bone fragment at base of chronic wound:          Extensively degenerated fibrocartilaginous tissue with a rim of           fibrinopurulent debris          Correlate with culture findings            Comment: While no residual intact bone is identified, these           findings are suggestive of adjacent osteomyelitis.      ASSESSMENT AND PLAN:     1. Sacral decubitus ulcer, stage IV (HCC)  Comments: Ulcer first noted in early April 2022 as small open area which quickly enlarged.  This ulcer is present distal from previous sacral ulcer which healed after surgery in January.  Patient has history of flap reconstruction x2 to this area.  CT shows progressive destruction of distal sacrum and proximal coccyx.    4/21/2023: Wound measuring larger, appears stable with granulation tissue and slough at base  -Excisional debridement of wound in clinic today, medically necessary to promote wound healing.  - Patient does not currently have follow up with Dr. Saini. If wound deteriorates or fails to improve can consider re-establishing with Dr. Saini for evaluation for coverage options, however patient does not particularly want to undergo surgery and is happy with conservative approach.  -Patient to continue  offloading sacral area as much as possible  -Roho cushion in wheelchair     Wound care: Hydrofiber silver strip, hydrofiber silver, Mepilex    2. Postoperative wound dehiscence, subsequent encounter  Comments: On 4/26 patient underwent irrigation and debridement of multiple compartments of the left lower extremity states for pressure injury, with bone excision, and complex closure of chronic wound using biologic skin substitute.  During postop visit in surgeons office on 5/11, surgical site was noted to be dehisced.  Patient was referred to wound clinic for management.    4/21/2023: Slight opening of wound, mostly epithelialized. Tissue is boggy and poor quality.  - Tissue removed from wound on 2/17 was found to be extensively degenerated fibrocartilaginous tissue with rim of fibropurulent debris which is suggestive of adjacent OM.  - Xray on 3/7 demonstrated no acute pathology and only old fracture and deformity. Did not suggest acute OM.  -Patient does not wish to consider surgical options at this time.  Wound care: Allevyn, tubigrip    3. Deep tissue injury  4. Pressure injury of left leg, unstageable (HCC)  5. Pressure injury of left foot  Comments: DTI to posterior left lower leg first observed in clinic 7/29/2022.  Patient does not know how it started, had been wearing heel float boot consistently.    4/21/2023: Area of DTI to posterior leg remains stable.    - DTI plantar foot has worsened since last assessment  -Continue to monitor for any deterioration in tissue quality  -Patient is currently wearing Prevalon boots to help prevent pressure injuries to bilateral lower extremities, however wounds continue to worsen.    Wound care: Hydrofiber silver, Mepilex, Tubigrip D    6. Pressure injury of left heel, stage 3 (HCC)  Comments: First noted in clinic on 10/21/2022, originally noted to be stage II    4/21/2023: Wound is larger despite use of Prevalon boot  - Excisional debridement was performed in clinic,  medically necessary to promote wound healing.  - Discussed further offloading. Recommend he use pillow under leg and float heel with or without Prevalon boot while in bed.  - Heel does not appear to contact any solid surfaces while in wheelchair  - Suspected pressure injury to plantar foot due to PRAFO boot, we apply allyven dressing to relieve pressure and if pillow does not work he may use PRAFO cautiously.     Wound Care: Hydrofiber Silver, Heel Mepilex to reduce pressure and friction    7. Quadriplegia, C5-C7, incomplete (HCC)  Comments: Complicating factor.  Impaired mobility and sensation  -Patient is still spending 7-8 hours/day up in his wheelchair, though he does have appropriate offloading cushion, and knows to reposition frequently.  Wears heel float boots bilaterally at all times        Please note that this note may have been created using voice recognition software. I have worked with technical experts from [employer] [facility] [employer] to optimize the interface.  I have made every reasonable attempt to correct obvious errors, but there may be errors of grammar and possibly content that I did not discover before finalizing the note.

## 2023-04-21 NOTE — PROGRESS NOTES
Home health wound care orders routed to [facility] via [employer] to fax #[phone].  Patient's heel wound is deteriorating, patient visits increased to once a week.

## 2023-04-21 NOTE — PATIENT INSTRUCTIONS
-Keep dressings clean and dry. Change dressings every 3-4 days, and if they become over saturated, soiled or fall off.     -Remove your compression garments if you have severe pain, severe swelling, numbness, color change, or temperature change in your toes. If you need to remove your compression garments, do so by unrolling them. Do not cut the compression garments off, this is to prevent cutting yourself on accident.    -Should you experience any significant changes in your wound(s), such as signs of infection (increasing redness, swelling, localized heat, increased pain, fever > 101 F, chills) or have any questions regarding your home care instructions, please contact the wound center at [phone]. If after hours, contact your primary care physician or go to the hospital emergency room.     -If you are 5 or more minutes late for an appointment, we reserve the right to cancel and reschedule that appointment. Additionally, if you are habitually late or not showing (3 late cancellations and/or no shows), we reserve the right to cancel your remaining appointments and it will be your responsibility to obtain a new referral if services are still needed.

## 2023-04-28 ENCOUNTER — OFFICE VISIT (OUTPATIENT)
Dept: WOUND CARE | Facility: MEDICAL CENTER | Age: 73
End: 2023-04-28
Attending: INTERNAL MEDICINE
Payer: MEDICARE

## 2023-04-28 VITALS
RESPIRATION RATE: 18 BRPM | HEART RATE: 58 BPM | OXYGEN SATURATION: 98 % | TEMPERATURE: 97 F | SYSTOLIC BLOOD PRESSURE: 109 MMHG | DIASTOLIC BLOOD PRESSURE: 69 MMHG

## 2023-04-28 DIAGNOSIS — L89.623 PRESSURE INJURY OF LEFT HEEL, STAGE 3 (HCC): ICD-10-CM

## 2023-04-28 DIAGNOSIS — L89.893 PRESSURE INJURY OF LEFT FOOT, STAGE 3 (HCC): ICD-10-CM

## 2023-04-28 DIAGNOSIS — T14.8XXA DEEP TISSUE INJURY: ICD-10-CM

## 2023-04-28 DIAGNOSIS — L89.154 SACRAL DECUBITUS ULCER, STAGE IV (HCC): ICD-10-CM

## 2023-04-28 DIAGNOSIS — G82.54 QUADRIPLEGIA, C5-C7 INCOMPLETE (HCC): Primary | ICD-10-CM

## 2023-04-28 DIAGNOSIS — T81.31XD POSTOPERATIVE WOUND DEHISCENCE, SUBSEQUENT ENCOUNTER: ICD-10-CM

## 2023-04-28 PROCEDURE — 11043 DBRDMT MUSC&/FSCA 1ST 20/<: CPT | Performed by: STUDENT IN AN ORGANIZED HEALTH CARE EDUCATION/TRAINING PROGRAM

## 2023-04-28 PROCEDURE — 11042 DBRDMT SUBQ TIS 1ST 20SQCM/<: CPT

## 2023-04-28 PROCEDURE — 11042 DBRDMT SUBQ TIS 1ST 20SQCM/<: CPT | Mod: 59 | Performed by: STUDENT IN AN ORGANIZED HEALTH CARE EDUCATION/TRAINING PROGRAM

## 2023-04-28 PROCEDURE — 11043 DBRDMT MUSC&/FSCA 1ST 20/<: CPT

## 2023-04-28 NOTE — PATIENT INSTRUCTIONS
-Keep your wound dressing clean, dry, and intact.    -Change your dressing if it becomes soiled, soaked, or falls off.    -Should you experience any significant changes in your wound(s), such as infection (redness, swelling, localized heat, increased pain, fever > 101 F, chills) or have any questions regarding your home care instructions, please contact the wound center at [phone]. If after hours, contact your primary care physician or go to the hospital emergency room.

## 2023-04-28 NOTE — PROGRESS NOTES
Provider Encounter- Pressure Injury        HISTORY OF PRESENT ILLNESS  Wound History:    START OF CARE IN CLINIC: 3/3/2023 (return to clinic after hospitalization)    REFERRING PROVIDER: Sahara Mendez      WOUNDS- Pressure Injury, Sacrococcygeal, stage IV                                                             Surgical wound dehiscence, left medial lower leg-status post surgical I&D of stage IV pressure injury and           biologic application                    Pressure injury, DTI, left posterior lower leg-first observed in clinic 7/29/2022       Pressure injury stage III L heel - first noted 10/21     Right 2nd toe avulsion - first noted 10/21     Skin tag left posterior ear - first noted 10/21     HISTORY: Patient with history of incomplete quadriplegia referred to [facility] for treatment of a stage IV pressure injury.  He has a history of previous pressure injuries to this area, and underwent muscle flaps in 2019, and then again in 2020.  He was seen in the wound clinic in November 2021 for an ulcer proximal from his current ulcer, and pressure injuries to his left posterior lower leg and left heel.  At that time, it was discovered that the patient had retained VAC foam embedded in the wound bed of the sacral wound.  Attempts were made to get him back to his plastic surgeon, though unsuccessful.  In January he underwent surgical removal of VAC sponge along with excisional debridement of his sacral wound by Dr. Chaves.  After the surgery, his wound went on to heal without incident.   In early April 2022, his home health nurse noted a new sacral ulcer, below the previous ulcer which quickly tripled in size over the following weeks.  The ulcer to his left medial lower leg had also deteriorated, with bone visible at the base..  He was hospitalized from 4/22 until 4/27/2022 and underwent surgery with Dr. Raman on 4/26 for irrigation and debridement of multiple compartments of the left lower extremity, bone  excision, and complex closure of chronic wound using biologic skin substitute.   His sacrococcygeal wound was not surgically addressed during this admission.  He was discharged back to his group home, with home health, and referral to outpatient wound clinic for his sacral wound.  He was instructed to follow-up with his surgeon for his lower leg wound.       Postoperatively, the left medial lower leg incision dehisced.  He was seen by his surgeon at [facility] on 5/11.  The surgeon opted to leave remaining sutures in place, and refer him to the wound clinic for treatment of this wound.   Treatment of this wound was initiated in clinic on 5/12.  During this visit was also noted that his heel DTI had resolved, but that he had a new pressure injury to his left posterior lower extremity.     A new pressure injury was noted to patient's right upper buttock/lower back on 5/20/2022.  Wound was linear in shape, skin discolored but intact.     Abrasion noted to left anterior lower leg.  First observed in clinic on 7/22/2022.  Patient states he bumped his leg into his food tray.     Small DTI noted to patient's left lateral lower leg on 7/29/2022.  Skin intact but discolored.     Large area of deep tissue injury noted to patient's left exterior lower leg.  Patient denied any trauma to this area.  Skin intact.  Wound documented.    1/27/2023: Patient [place] [facility] [place] with gross hematuria. He underwent RICHARD which showed watchman device was in place and he was taken off of Xarelto. While hospitalized wound team was consulted. He was referred back to [facility] and home health upon discharge.    Patient was hospitalized at [facility] for pyelonephritis from 2/26 until 3/2/2023, admitted for fever and general malaise.  He was admitted and initially started on linezolid and meropenem for suspected UTI and history of multidrug-resistant organisms.  Urine cultures were negative. ID was consulted, recommended CT of chest and  "abdomen,which were negative for acute findings. However, he was treated with 5 days total course of antibiotics for suspected UTI, and symptoms completely resolved.  During this admission, the inpatient wound team was consulted for treatment of his sacral and lower leg wounds.  A wound culture was taken from his left heel pressure ulcer, negative.  Once stabilized, he was discharged home and referred back to [facility] to resume treatment of his wounds.    Pertinent Medical History: Incomplete quadriplegia, history of stage IV pressure injuries, history of flap procedures to sacral pressure injuries, osteomyelitis, obesity, colostomy in place   Contributing factors: Immobility and Obesity, impaired sensation    Personal support: Attendant-staff at [facility] and home health nursing    TOBACCO USE:   Former smoker, quit in 1977.  Never used smokeless tobacco    Patient's problem list, allergies, and current medications reviewed and updated in Epic    Interval History:  Interval History thinned 7/29/2022.  Please see previous notes for complete interval history.     Interval History thinned 1/27/2023. Please see previous note for complete interval history.    Interval History thinned 3/3/2023.  Please see previous notes for complete interval history.      3/3/2023 : Clinic visit with ADILSON Arthur, FNPEGGY-BC, CWDINESHN, CFTRACI.   Patient returns to clinic after hospitalization for UTI.  He states that overall he is feeling well, denies fevers, chills, nausea, vomiting, cough or shortness of breath.    Per last wound clinic note, a loose bone fragment was extracted from the wound bed of his left medial leg wound and sent for pathology.  Histology report described, \"extensively degenerated fibrocartilaginous tissue\", suggestive of adjacent osteomyelitis.  X-ray of tib-fib ordered to assess for OM.    3/10/2023: Clinic visit with Steve Hodges MD. Patient reports feeling in normal state of health. He obtained Xray left tib-fib, " we do not have images, but report states no evidence of acute pathology such as OM. We discussed options for more aggressive treatment, patient would prefer to watch and wait. Left heel remains open since hospitalization despite using Prevalon boots, will prescribe Prafo boot for offloading. Sacrum measures slightly smaller, does not appear significantly changed.    3/24/2023: Clinic visit with Steve Hodges MD. Patient reports feeling well. He obtained PRAFO boot and left heel wound smaller. Rest of wounds are not significantly changed. New linear friction wound buttocks. Patient reports occurred when transitioning from laying to sitting with his low airloss mattress.    3/31/2023 : Clinic visit with Kelly Sandy, ADILSON, FNP-BC, CWDINESHN, CFCN.   Anjum presents today complaining of cold symptoms.  States he has had an upper respiratory illness for a couple of weeks now but feels he is getting better.  He does have a new tender area to his plantar foot with mild discoloration, possibly caused by seam on insole to PRAFO boot.  He has stopped wearing the PRAFO and is now wearing Prevalon boot, and feels this is improving.  The left medial lower leg wound does show some improvement, with decrease in area.  Sacral wound is about the same.  The left heel ulcer is again almost healed, with thin layer of epithelium noted to wound bed, scant drainage.    4/7/2023: Clinic visit with Steve Hodges MD. Patient reports doing well. Excited for seeing family for Lamar and SO is coming to visit. Patient denies any symptoms of infection. His left medial leg wound is covered with thin, fragile epithelium and boggy due to poor quality of underlying tissue rather than abscess or fluid collection. Left plantar foot has opened slightly but appears improving, left heel is smaller. He continues to use Prevalon boots. Patient reports new abrasion to lateral right knee from rubbing on the dashboard, does not appear open. Home health has  been applying foam dressing for padding.    4/21/2023: Clinic visit with Steve Hodges MD. Patient reports feeling well, denies any symptoms of infection. He has been using Prevalon boots 24 hours a day, despite this, he has worsening left heel pressure injury. We discussed other methods and he will try to use pillow under leg to offload heel. We also applied an allyven dressing to plantar foot to reduce friction and pressure and he can try to use PRAFO boot. His heel does not appear to be in contact with any solid surface on his wheelchair. Other wounds are unchanged. Due to worsening left heel wound, will bring back to clinic next week for evaluation.    4/28/2023: Clinic visit with Steve Hodges MD. Patient reports feeling well, denies any fevers or chills. He has been using pillow under leg to completely offload heel at night and both heel / plantar foot wound improved. He reports with the nice weather he has been spending majority of his day up in chair outside. Unfortunately his sacral wound larger and bone palpable. He is unsure if he would want to be evaluated again by surgery for possible flap.      REVIEW OF SYSTEMS:   Unchanged from previous wound clinic assessment on 4/21/2023, except as noted in interval history above     PHYSICAL EXAMINATION:   /69   Pulse (!) 58   Temp 36.1 °C (97 °F) (Temporal)   Resp 18   SpO2 98%   Physical Exam  Constitutional:       Appearance: He is obese.   Cardiovascular:      Rate and Rhythm: Normal rate.   Pulmonary:      Effort: Pulmonary effort is normal.   Abdominal:      Comments: Colostomy left lower quadrant   Genitourinary:     Comments: Suprapubic catheter  Skin:     Comments: Stage IV coccygeal pressure injury - Measuring larger, bone palpable at base. Less granulation tissue noted and has some slough.    Full-thickness wound to left medial lower leg - Stable. Small opening, rest of wound has epithelialized. Tissue quality remains poor.    Stage III  pressure injury left posterior heel - Measuring smaller  Stage III pressure injury plantar foot - Measuring smaller  Abrasion right lateral knee - Resolved    Refer to wound flowsheet and photos   Neurological:      Mental Status: He is alert and oriented to person, place, and time.   Psychiatric:         Mood and Affect: Mood normal.       WOUND ASSESSMENT  Wound 02/27/23 Pressure Injury Coccyx Stage IV (Active)   Wound Image    04/28/23 1400   Site Assessment Red;Yellow 04/28/23 1400   Periwound Assessment Fragile;Scar tissue 04/28/23 1400   Margins Unattached edges 04/28/23 1400   Drainage Amount Large 04/28/23 1400   Drainage Description Serosanguineous 04/28/23 1400   Treatments Cleansed;Provider debridement 04/28/23 1400   Wound Cleansing Normal Saline Irrigation 04/28/23 1400   Periwound Protectant Skin Protectant Wipes to Periwound;Barrier Paste 04/28/23 1400   Dressing Cleansing/Solutions Not Applicable 04/28/23 1400   Dressing Options Hydrofiber Silver Strip;Hydrofiber Silver;Other (Comments) 04/28/23 1400   Dressing Change/Treatment Frequency Monday, Wednesday, Friday, and As Needed 03/31/23 1527   WOUND NURSE ONLY - Pressure Injury Stage 4 04/28/23 1400   Wound Length (cm) 2.4 cm 04/28/23 1400   Wound Width (cm) 1.5 cm 04/28/23 1400   Wound Depth (cm) 1.2 cm 04/28/23 1400   Wound Surface Area (cm^2) 3.6 cm^2 04/28/23 1400   Wound Volume (cm^3) 4.32 cm^3 04/28/23 1400   Post-Procedure Length (cm) 2.5 cm 04/28/23 1400   Post-Procedure Width (cm) 1.5 cm 04/28/23 1400   Post-Procedure Depth (cm) 1.2 cm 04/28/23 1400   Post-Procedure Surface Area (cm^2) 3.75 cm^2 04/28/23 1400   Post-Procedure Volume (cm^3) 4.5 cm^3 04/28/23 1400   Wound Healing % -19 04/28/23 1400   Tunneling (cm) 2.5 cm 04/28/23 1400   Tunneling Clock Position of Wound 12 04/28/23 1400   Undermining (cm) 0 cm 03/10/23 1400   Wound Odor None 04/28/23 1400   Exposed Structures Bone 04/28/23 1400   Number of days: 60       Wound 02/27/23 Heel  Posterior Left (Active)   Wound Image    04/28/23 1400   Site Assessment Pink;Red;Yellow 04/28/23 1400   Periwound Assessment Blanchable erythema;Fragile;Scar tissue 04/28/23 1400   Margins Attached edges 04/28/23 1400   Drainage Amount Moderate 04/28/23 1400   Drainage Description Serosanguineous 04/28/23 1400   Treatments Cleansed;Provider debridement 04/28/23 1400   Wound Cleansing Puracyn Barlow 04/28/23 1400   Periwound Protectant Skin Protectant Wipes to Periwound;Barrier Paste 04/28/23 1400   Dressing Cleansing/Solutions Not Applicable 04/28/23 1400   Dressing Options Hydrofiber Silver;Other (Comments);Tubigrip 04/28/23 1400   Dressing Change/Treatment Frequency Monday, Wednesday, Friday, and As Needed 03/31/23 1527   WOUND NURSE ONLY - Pressure Injury Stage 3 04/28/23 1400   Wound Length (cm) 3.3 cm 04/28/23 1400   Wound Width (cm) 2.2 cm 04/28/23 1400   Wound Depth (cm) 0.1 cm 04/28/23 1400   Wound Surface Area (cm^2) 7.26 cm^2 04/28/23 1400   Wound Volume (cm^3) 0.726 cm^3 04/28/23 1400   Post-Procedure Length (cm) 3.3 cm 04/28/23 1400   Post-Procedure Width (cm) 2.3 cm 04/28/23 1400   Post-Procedure Depth (cm) 0.2 cm 04/28/23 1400   Post-Procedure Surface Area (cm^2) 7.59 cm^2 04/28/23 1400   Post-Procedure Volume (cm^3) 1.518 cm^3 04/28/23 1400   Wound Healing % 27 04/28/23 1400   Tunneling (cm) 0 cm 04/28/23 1400   Undermining (cm) 0 cm 04/28/23 1400   Wound Odor None 04/28/23 1400   Exposed Structures None 04/28/23 1400   Number of days: 60       Wound 03/03/23 Full Thickness Wound Leg LLE medial (Active)   Wound Image   04/28/23 1400   Site Assessment Boggy;Epithelialization 04/28/23 1400   Periwound Assessment Purple;Scar tissue 04/28/23 1400   Margins Attached edges 04/28/23 1400   Drainage Amount Small 04/28/23 1400   Drainage Description Serosanguineous 04/28/23 1400   Treatments Cleansed;Site care 04/28/23 1400   Wound Cleansing Puracyn Barlow 04/28/23 1400   Periwound Protectant Skin Protectant  Wipes to Periwound;Barrier Paste 04/28/23 1400   Dressing Cleansing/Solutions Not Applicable 04/28/23 1400   Dressing Options Hydrofiber Silver;Other (Comments);Tubigrip 04/28/23 1400   Dressing Change/Treatment Frequency Monday, Wednesday, Friday, and As Needed 03/31/23 1527   Non-staged Wound Description Full thickness 04/28/23 1400   Wound Length (cm) 0.5 cm 04/28/23 1400   Wound Width (cm) 0.5 cm 04/28/23 1400   Wound Depth (cm) 0.1 cm 04/28/23 1400   Wound Surface Area (cm^2) 0.25 cm^2 04/28/23 1400   Wound Volume (cm^3) 0.025 cm^3 04/28/23 1400   Post-Procedure Length (cm) 0.5 cm 04/21/23 1530   Post-Procedure Width (cm) 0.5 cm 04/21/23 1530   Post-Procedure Depth (cm) 0.1 cm 04/21/23 1530   Post-Procedure Surface Area (cm^2) 0.25 cm^2 04/21/23 1530   Post-Procedure Volume (cm^3) 0.025 cm^3 04/21/23 1530   Wound Healing % 92 04/28/23 1400   Tunneling (cm) 0 cm 04/28/23 1400   Undermining (cm) 0 cm 04/28/23 1400   Wound Odor None 04/28/23 1400   Exposed Structures None 04/28/23 1400   Number of days: 56       Wound 03/31/23 Pressure Injury Foot Plantar;Distal Left (Active)   Wound Image    04/28/23 1400   Site Assessment Red;Yellow 04/28/23 1400   Periwound Assessment Fragile 04/28/23 1400   Margins Attached edges 04/28/23 1400   Drainage Amount Moderate 04/28/23 1400   Drainage Description Serosanguineous 04/28/23 1400   Treatments Cleansed;Provider debridement 04/28/23 1400   Wound Cleansing Puracyn Bixby 04/28/23 1400   Periwound Protectant Skin Protectant Wipes to Periwound;Barrier Paste 04/28/23 1400   Dressing Cleansing/Solutions Not Applicable 04/28/23 1400   Dressing Options Hydrofiber Silver;Other (Comments);Tubigrip 04/28/23 1400   WOUND NURSE ONLY - Pressure Injury Stage 3 04/28/23 1400   Wound Length (cm) 1 cm 04/28/23 1400   Wound Width (cm) 0.8 cm 04/28/23 1400   Wound Depth (cm) 0.2 cm 04/28/23 1400   Wound Surface Area (cm^2) 0.8 cm^2 04/28/23 1400   Wound Volume (cm^3) 0.16 cm^3 04/28/23 1400    Post-Procedure Length (cm) 0.8 cm 04/28/23 1400   Post-Procedure Width (cm) 0.8 cm 04/28/23 1400   Post-Procedure Depth (cm) 0.2 cm 04/28/23 1400   Post-Procedure Surface Area (cm^2) 0.64 cm^2 04/28/23 1400   Post-Procedure Volume (cm^3) 0.128 cm^3 04/28/23 1400   Wound Healing % -196 04/28/23 1400   Tunneling (cm) 0 cm 04/28/23 1400   Undermining (cm) 0 cm 04/28/23 1400   Wound Odor None 04/28/23 1400   Exposed Structures None 04/28/23 1400   Number of days: 28       Wound 04/21/23 Full Thickness Wound Leg Anterior Left (Active)   Wound Image   04/28/23 1400   Site Assessment Brown 04/28/23 1400   Periwound Assessment Edema;Fragile 04/28/23 1400   Margins Attached edges 04/28/23 1400   Drainage Amount None 04/28/23 1400   Drainage Description Serosanguineous 04/21/23 1530   Treatments Cleansed;Site care 04/28/23 1400   Wound Cleansing Foam Cleanser/Washcloth 04/28/23 1400   Periwound Protectant Skin Moisturizer 04/28/23 1400   Dressing Cleansing/Solutions Not Applicable 04/28/23 1400   Dressing Options Open to Air 04/28/23 1400   Non-staged Wound Description Full thickness 04/21/23 1530   Wound Length (cm) 0.4 cm 04/21/23 1530   Wound Width (cm) 0.3 cm 04/21/23 1530   Wound Depth (cm) 0.1 cm 04/21/23 1530   Wound Surface Area (cm^2) 0.12 cm^2 04/21/23 1530   Wound Volume (cm^3) 0.012 cm^3 04/21/23 1530   Tunneling (cm) 0 cm 04/21/23 1530   Undermining (cm) 0 cm 04/21/23 1530   Wound Odor None 04/28/23 1400   Exposed Structures None 04/28/23 1400   Number of days: 7       PROCEDURE: Excisional debridement of sacral wound  -2% viscous lidocaine applied topically to wound bed for approximately 5 minutes prior to debridement  -Curette used to debride wound bed.  Excisional debridement was performed to remove devitalized tissue until healthy, bleeding tissue was visualized.   Entire surface of wound, 3.75 cm² debrided.  Tissue debrided into the muscle / fascia layer.    -Bleeding controlled with manual pressure.     -Wound care completed by wound RN, refer to flowsheet  -Patient tolerated the procedure well, without c/o pain or discomfort.      PROCEDURE: Excisional debridement of left heel and left foot plantar wound  -2% viscous lidocaine applied topically to wound bed for approximately 5 minutes prior to debridement  -Curette used to debride wound bed.  Excisional debridement was performed to remove devitalized tissue until healthy, bleeding tissue was visualized.   Entire surface of wound, 8.23 cm² debrided.  Tissue debrided into the subcutaneous layer.    -Bleeding controlled with manual pressure.    -Wound care completed by wound RN, refer to flowsheet  -Patient tolerated the procedure well, without c/o pain or discomfort.        BIOLOGIC LOG  5/20/2022-first application.  PRODUCT: EpiCord 2 x 3 cm . PRODUCT #[id number]; EXPIRES: 2026-12-01 5/27/2022- second application.  PRODUCT: EpiCordEX 2 x 3 cm . PRODUCT #[id number]; EXPIRES: 2026-09-01    6/3/2022- third application.  PRODUCT: EpiCordEX 2 x 3 cm . PRODUCT #[id number]; EXPIRES: 2026-10-01    6/10/2022- fourth application.  PRODUCT: EpiCordEX 2 x 3 cm . PRODUCT #[id number]; EXPIRES: 2026-09-01 6/17/2022- fifth application.  PRODUCT: EpiCordEX 2 x 3 cm . PRODUCT #[id number]; EXPIRES: 2027-02-01 6/24/2022- sixth application.  PRODUCT: EpiCordEX 2 x 3 cm . PRODUCT #[id number]; EXPIRES: 2027-02-01 07/01/22: Seventh application.  Product: Epicort Dx 2.0 x 3.0 cm reorder number [id number]; product number [id number]; expires 2027-02-01 7/22/2022- eighth application.  PRODUCT: EpiCord 2 x 3 cm, reorder #[id number]. PRODUCT #[id number]; EXPIRES: 2027-02-01      Pertinent Labs and Diagnostics:    Labs:     A1c: No results found for: HBA1C     [facility] results, 7/1/2022 (under media tab)    CRP 13    ESR 31      IMAGING:     10/3/2022-CT of pelvis with contrast  IMPRESSION:     1.  Posterior soft tissue  sacral wound and 1.5 x 3.7 cm abscess.   2.  Progressive destruction of the distal sacrum and proximal coccyx. Sclerosis and irregularity of the distal sacrum consistent with osteomyelitis.   3.  Old wound within the soft tissues posterior to the distal left SI joint with possible fistulous communication.    10/3/2022-CT of tib-fib with and without contrast, with reconstruction  IMPRESSION:     1.  Old fractures of the left tibia and fibula with extensive callus formation and bony bridging as well as extensive dystrophic calcifications. Similar to previous.   2.  Distal medial soft tissue wounds with ill-defined superficial in the soft tissue thickening and fluid collections suggestive of phlegmon. No organized abscess identified.   3.  No definite osteomyelitis.   4.  Arthritic changes.        VASCULAR STUDIES: No results found.    LAST  WOUND CULTURE:   Lab Results   Component Value Date/Time    CULTRSULT Mixed enteric ade >100,000 cfu/mL 04/15/2023 04:37 PM      PATHOLOGY  2/17/2023-bone fragment extracted from left lower extremity wound\\  FINAL DIAGNOSIS:     A. Left leg bone fragment at base of chronic wound:          Extensively degenerated fibrocartilaginous tissue with a rim of           fibrinopurulent debris          Correlate with culture findings            Comment: While no residual intact bone is identified, these           findings are suggestive of adjacent osteomyelitis.      ASSESSMENT AND PLAN:     1. Sacral decubitus ulcer, stage IV ([facility])  Comments: Ulcer first noted in early April 2022 as small open area which quickly enlarged.  This ulcer is present distal from previous sacral ulcer which healed after surgery in January.  Patient has history of flap reconstruction x2 to this area.  CT shows progressive destruction of distal sacrum and proximal coccyx.    4/28/2023: Wound larger, bone palpable, less granulation tissue with thin layer of slough.  -Excisional debridement of wound in clinic  today, medically necessary to promote wound healing.  - Patient reports that with improvement in weather he has been up in chair most of the day outside. His wound has subsequently worsened.  - Patient does not currently have follow up with Dr. Saini. If wound deteriorates or fails to improve can consider re-establishing with Dr. Saini for evaluation for coverage options. Patient does not want to pursue at this time and is happy with current conservative approach even with wound worsening.  -Roho cushion in wheelchair     Wound care: Hydrofiber silver strip, hydrofiber silver, Mepilex    2. Postoperative wound dehiscence, subsequent encounter  Comments: On 4/26/2022 patient underwent irrigation and debridement of multiple compartments of the left lower extremity states for pressure injury, with bone excision, and complex closure of chronic wound using biologic skin substitute.  During postop visit in surgeons office on 5/11, surgical site was noted to be dehisced.  Patient was referred to wound clinic for management.    4/28/2023: Wound stable, mostly epithelialized though tissue quality is very poor and remains boggy.  - Tissue removed from wound on 2/17 was found to be extensively degenerated fibrocartilaginous tissue with rim of fibropurulent debris which is suggestive of adjacent OM.  - Xray on 3/7 demonstrated no acute pathology and only old fracture and deformity. Did not suggest acute OM.  -Patient does not wish to consider surgical options at this time.  Wound care: Hydrofiber silver, silicone adhesive foam, tubigrip    3. Deep tissue injury  4. Pressure injury of left foot, stage III  Comments: DTI to posterior left lower leg first observed in clinic 7/29/2022.  Patient does not know how it started, had been wearing heel float boot consistently.    4/28/2023: Area of DTI to posterior leg remains stable.    - Pressure injury foot is measuring smaller.  -Continue to monitor for any deterioration in tissue  quality  -Patient is currently wearing Prevalon boots to help prevent pressure injuries to bilateral lower extremities, however wounds continue to worsen.    Wound care: Hydrofiber silver, Mepilex, Tubigrip D    5. Pressure injury of left heel, stage 3 ([facility])  Comments: First noted in clinic on 10/21/2022, originally noted to be stage II    4/28/2023: Wound is measuring smaller, superficial.  - Excisional debridement was performed in clinic, medically necessary to promote wound healing.  - Patient wound is smaller as he is offloading while in bed with pillow under left leg to float heel.  - Heel does not appear to contact any solid surfaces while in wheelchair  - Suspected pressure injury to plantar foot due to PRAFO boot, he has not used since.     Wound Care: Hydrofiber Silver, Heel Mepilex to reduce pressure and friction    6. Quadriplegia, C5-C7, incomplete ([facility])  Comments: Complicating factor.  Impaired mobility and sensation  -Patient is still spending 7-8 hours/day up in his wheelchair, though he does have appropriate offloading cushion, and knows to reposition frequently.  Wears heel float boots bilaterally at all times        Please note that this note may have been created using voice recognition software. I have worked with technical experts from [employer] [facility] [employer] to optimize the interface.  I have made every reasonable attempt to correct obvious errors, but there may be errors of grammar and possibly content that I did not discover before finalizing the note.

## 2023-05-05 ENCOUNTER — OFFICE VISIT (OUTPATIENT)
Dept: WOUND CARE | Facility: MEDICAL CENTER | Age: 73
End: 2023-05-05
Attending: INTERNAL MEDICINE
Payer: MEDICARE

## 2023-05-05 VITALS
DIASTOLIC BLOOD PRESSURE: 53 MMHG | RESPIRATION RATE: 17 BRPM | OXYGEN SATURATION: 97 % | TEMPERATURE: 99 F | SYSTOLIC BLOOD PRESSURE: 90 MMHG | HEART RATE: 63 BPM

## 2023-05-05 DIAGNOSIS — L89.623 PRESSURE INJURY OF LEFT HEEL, STAGE 3 (HCC): ICD-10-CM

## 2023-05-05 DIAGNOSIS — T81.31XD POSTOPERATIVE WOUND DEHISCENCE, SUBSEQUENT ENCOUNTER: ICD-10-CM

## 2023-05-05 DIAGNOSIS — G82.54 QUADRIPLEGIA, C5-C7 INCOMPLETE (HCC): ICD-10-CM

## 2023-05-05 DIAGNOSIS — L89.893 PRESSURE INJURY OF LEFT FOOT, STAGE 3 (HCC): ICD-10-CM

## 2023-05-05 DIAGNOSIS — T14.8XXA DEEP TISSUE INJURY: ICD-10-CM

## 2023-05-05 DIAGNOSIS — L89.154 SACRAL DECUBITUS ULCER, STAGE IV (HCC): ICD-10-CM

## 2023-05-05 PROCEDURE — 11042 DBRDMT SUBQ TIS 1ST 20SQCM/<: CPT | Mod: 59 | Performed by: STUDENT IN AN ORGANIZED HEALTH CARE EDUCATION/TRAINING PROGRAM

## 2023-05-05 PROCEDURE — 11042 DBRDMT SUBQ TIS 1ST 20SQCM/<: CPT

## 2023-05-05 PROCEDURE — 11043 DBRDMT MUSC&/FSCA 1ST 20/<: CPT | Performed by: STUDENT IN AN ORGANIZED HEALTH CARE EDUCATION/TRAINING PROGRAM

## 2023-05-05 PROCEDURE — 11043 DBRDMT MUSC&/FSCA 1ST 20/<: CPT

## 2023-05-05 NOTE — PROGRESS NOTES
Provider Encounter- Pressure Injury        HISTORY OF PRESENT ILLNESS  Wound History:    START OF CARE IN CLINIC: 3/3/2023 (return to clinic after hospitalization)    REFERRING PROVIDER: Sahara Mendez      WOUNDS- Pressure Injury, Sacrococcygeal, stage IV                                                             Surgical wound dehiscence, left medial lower leg-status post surgical I&D of stage IV pressure injury and           biologic application                    Pressure injury, DTI, left posterior lower leg-first observed in clinic 7/29/2022       Pressure injury stage III L heel - first noted 10/21     Right 2nd toe avulsion - first noted 10/21     Skin tag left posterior ear - first noted 10/21     HISTORY: Patient with history of incomplete quadriplegia referred to [facility] for treatment of a stage IV pressure injury.  He has a history of previous pressure injuries to this area, and underwent muscle flaps in 2019, and then again in 2020.  He was seen in the wound clinic in November 2021 for an ulcer proximal from his current ulcer, and pressure injuries to his left posterior lower leg and left heel.  At that time, it was discovered that the patient had retained VAC foam embedded in the wound bed of the sacral wound.  Attempts were made to get him back to his plastic surgeon, though unsuccessful.  In January he underwent surgical removal of VAC sponge along with excisional debridement of his sacral wound by Dr. Chaves.  After the surgery, his wound went on to heal without incident.   In early April 2022, his home health nurse noted a new sacral ulcer, below the previous ulcer which quickly tripled in size over the following weeks.  The ulcer to his left medial lower leg had also deteriorated, with bone visible at the base..  He was hospitalized from 4/22 until 4/27/2022 and underwent surgery with Dr. Raman on 4/26 for irrigation and debridement of multiple compartments of the left lower extremity, bone  excision, and complex closure of chronic wound using biologic skin substitute.   His sacrococcygeal wound was not surgically addressed during this admission.  He was discharged back to his group home, with home health, and referral to outpatient wound clinic for his sacral wound.  He was instructed to follow-up with his surgeon for his lower leg wound.       Postoperatively, the left medial lower leg incision dehisced.  He was seen by his surgeon at [facility] on 5/11.  The surgeon opted to leave remaining sutures in place, and refer him to the wound clinic for treatment of this wound.   Treatment of this wound was initiated in clinic on 5/12.  During this visit was also noted that his heel DTI had resolved, but that he had a new pressure injury to his left posterior lower extremity.     A new pressure injury was noted to patient's right upper buttock/lower back on 5/20/2022.  Wound was linear in shape, skin discolored but intact.     Abrasion noted to left anterior lower leg.  First observed in clinic on 7/22/2022.  Patient states he bumped his leg into his food tray.     Small DTI noted to patient's left lateral lower leg on 7/29/2022.  Skin intact but discolored.     Large area of deep tissue injury noted to patient's left exterior lower leg.  Patient denied any trauma to this area.  Skin intact.  Wound documented.    1/27/2023: Patient was admitted to [facility] from 1/23-1/25/2023 with gross hematuria. He underwent RICHARD which showed watchman device was in place and he was taken off of Xarelto. While hospitalized wound team was consulted. He was referred back to [facility] and home health upon discharge.    Patient was hospitalized at [facility] for pyelonephritis from 2/26 until 3/2/2023, admitted for fever and general malaise.  He was admitted and initially started on linezolid and meropenem for suspected UTI and history of multidrug-resistant organisms.  Urine cultures were negative. ID was consulted, recommended CT of chest and  "abdomen,which were negative for acute findings. However, he was treated with 5 days total course of antibiotics for suspected UTI, and symptoms completely resolved.  During this admission, the inpatient wound team was consulted for treatment of his sacral and lower leg wounds.  A wound culture was taken from his left heel pressure ulcer, negative.  Once stabilized, he was discharged home and referred back to [facility] to resume treatment of his wounds.    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  Never used smokeless tobacco    Patient's problem list, allergies, and current medications reviewed and updated in Epic    Interval History:  Interval History thinned 7/29/2022.  Please see previous notes for complete interval history.     Interval History thinned 1/27/2023. Please see previous note for complete interval history.    Interval History thinned 3/3/2023.  Please see previous notes for complete interval history.      3/3/2023 : Clinic visit with ADILSON Arthur, FNPEGGY-BC, CWDINESHN, CFTRACI.   Patient returns to clinic after hospitalization for UTI.  He states that overall he is feeling well, denies fevers, chills, nausea, vomiting, cough or shortness of breath.    Per last wound clinic note, a loose bone fragment was extracted from the wound bed of his left medial leg wound and sent for pathology.  Histology report described, \"extensively degenerated fibrocartilaginous tissue\", suggestive of adjacent osteomyelitis.  X-ray of tib-fib ordered to assess for OM.    3/10/2023: Clinic visit with Steve Hodges MD. Patient reports feeling in normal state of health. He obtained Xray left tib-fib, " we do not have images, but report states no evidence of acute pathology such as OM. We discussed options for more aggressive treatment, patient would prefer to watch and wait. Left heel remains open since hospitalization despite using Prevalon boots, will prescribe Prafo boot for offloading. Sacrum measures slightly smaller, does not appear significantly changed.    3/24/2023: Clinic visit with Steve Hodges MD. Patient reports feeling well. He obtained PRAFO boot and left heel wound smaller. Rest of wounds are not significantly changed. New linear friction wound buttocks. Patient reports occurred when transitioning from laying to sitting with his low airloss mattress.    3/31/2023 : Clinic visit with Kelly Sandy, ADILSON, FNP-BC, CWDINESHN, CFCN.   Anjum presents today complaining of cold symptoms.  States he has had an upper respiratory illness for a couple of weeks now but feels he is getting better.  He does have a new tender area to his plantar foot with mild discoloration, possibly caused by seam on insole to PRAFO boot.  He has stopped wearing the PRAFO and is now wearing Prevalon boot, and feels this is improving.  The left medial lower leg wound does show some improvement, with decrease in area.  Sacral wound is about the same.  The left heel ulcer is again almost healed, with thin layer of epithelium noted to wound bed, scant drainage.    4/7/2023: Clinic visit with Steve Hodges MD. Patient reports doing well. Excited for seeing family for Lamar and SO is coming to visit. Patient denies any symptoms of infection. His left medial leg wound is covered with thin, fragile epithelium and boggy due to poor quality of underlying tissue rather than abscess or fluid collection. Left plantar foot has opened slightly but appears improving, left heel is smaller. He continues to use Prevalon boots. Patient reports new abrasion to lateral right knee from rubbing on the dashboard, does not appear open. Home health has  been applying foam dressing for padding.    4/21/2023: Clinic visit with Steve Hodges MD. Patient reports feeling well, denies any symptoms of infection. He has been using Prevalon boots 24 hours a day, despite this, he has worsening left heel pressure injury. We discussed other methods and he will try to use pillow under leg to offload heel. We also applied an allyven dressing to plantar foot to reduce friction and pressure and he can try to use PRAFO boot. His heel does not appear to be in contact with any solid surface on his wheelchair. Other wounds are unchanged. Due to worsening left heel wound, will bring back to clinic next week for evaluation.    4/28/2023: Clinic visit with Steve Hodges MD. Patient reports feeling well, denies any fevers or chills. He has been using pillow under leg to completely offload heel at night and both heel / plantar foot wound improved. He reports with the nice weather he has been spending majority of his day up in chair outside. Unfortunately his sacral wound larger and bone palpable. He is unsure if he would want to be evaluated again by surgery for possible flap.    5/5/2023: Clinic visit with Steve Hodges MD. Patient denies any complaints today. He reports that he has spent less time in wheelchair in effort to improve offloading. Patients lower extremity wounds are fairly stable. Sacral wound appears stable with small partial thickness linear wound superior. He does not recall any specific injury.      REVIEW OF SYSTEMS:   Unchanged from previous wound clinic assessment on 4/28/2023, except as noted in interval history above     PHYSICAL EXAMINATION:   BP 90/53   Pulse 63   Temp 37.2 °C (99 °F) (Temporal)   Resp 17   SpO2 97%   Physical Exam  Constitutional:       Appearance: He is obese.   Cardiovascular:      Rate and Rhythm: Normal rate.   Pulmonary:      Effort: Pulmonary effort is normal.   Abdominal:      Comments: Colostomy left lower quadrant    Genitourinary:     Comments: Suprapubic catheter  Skin:     Comments: Stage IV coccygeal pressure injury - Measuring larger, bone palpable at base. Less granulation tissue noted and has some slough. Superior to wound has partial thickness wound that will be monitored    Full-thickness wound to left medial lower leg - Stable. Small opening, rest of wound has epithelialized. Tissue quality remains poor.    Stage III pressure injury left posterior heel - Stable  Stage III pressure injury plantar foot - Stable  Abrasion right lateral knee - Resolved    Refer to wound flowsheet and photos   Neurological:      Mental Status: He is alert and oriented to person, place, and time.   Psychiatric:         Mood and Affect: Mood normal.       WOUND ASSESSMENT  Wound 02/27/23 Pressure Injury Coccyx Stage IV (Active)   Wound Image    05/05/23 1400   Site Assessment Red;Yellow 05/05/23 1400   Periwound Assessment Fragile;Scar tissue 05/05/23 1400   Margins Unattached edges 05/05/23 1400   Drainage Amount Large 05/05/23 1400   Drainage Description Serosanguineous 05/05/23 1400   Treatments Cleansed;Provider debridement;Site care 05/05/23 1400   Wound Cleansing Normal Saline Irrigation 05/05/23 1400   Periwound Protectant Skin Protectant Wipes to Periwound;Barrier Paste 05/05/23 1400   Dressing Cleansing/Solutions Not Applicable 05/05/23 1400   Dressing Options Hydrofiber Silver Strip;Hydrofiber Silver;Other (Comments) 05/05/23 1400   Dressing Changed Changed 05/05/23 1400   Dressing Change/Treatment Frequency Monday, Wednesday, Friday, and As Needed 03/31/23 1527   WOUND NURSE ONLY - Pressure Injury Stage 4 05/05/23 1400   Wound Length (cm) 3.5 cm 05/05/23 1400   Wound Width (cm) 1.7 cm 05/05/23 1400   Wound Depth (cm) 1.2 cm 05/05/23 1400   Wound Surface Area (cm^2) 5.95 cm^2 05/05/23 1400   Wound Volume (cm^3) 7.14 cm^3 05/05/23 1400   Post-Procedure Length (cm) 3.6 cm 05/05/23 1400   Post-Procedure Width (cm) 1.8 cm 05/05/23  1400   Post-Procedure Depth (cm) 1.2 cm 05/05/23 1400   Post-Procedure Surface Area (cm^2) 6.48 cm^2 05/05/23 1400   Post-Procedure Volume (cm^3) 7.776 cm^3 05/05/23 1400   Wound Healing % -96 05/05/23 1400   Tunneling (cm) 2.5 cm 05/05/23 1400   Tunneling Clock Position of Wound 12 05/05/23 1400   Undermining (cm) 0 cm 05/05/23 1400   Wound Odor None 05/05/23 1400   Exposed Structures Bone 05/05/23 1400   Number of days: 67       Wound 02/27/23 Heel Posterior Left (Active)   Wound Image    05/05/23 1400   Site Assessment Red;Yellow 05/05/23 1400   Periwound Assessment Blanchable erythema;Fragile;Scar tissue 05/05/23 1400   Margins Attached edges 05/05/23 1400   Drainage Amount Moderate 05/05/23 1400   Drainage Description Serosanguineous 05/05/23 1400   Treatments Cleansed;Provider debridement;Site care 05/05/23 1400   Wound Cleansing Puracyn [place] 05/05/23 1400   Periwound Protectant Skin Protectant Wipes to Periwound;Barrier Paste 05/05/23 1400   Dressing Cleansing/Solutions Not Applicable 05/05/23 1400   Dressing Options Hydrofiber Silver;Other (Comments);Tubigrip 05/05/23 1400   Dressing Changed Changed 05/05/23 1400   Dressing Change/Treatment Frequency Monday, Wednesday, Friday, and As Needed 03/31/23 1527   WOUND NURSE ONLY - Pressure Injury Stage 3 05/05/23 1400   Wound Length (cm) 2.5 cm 05/05/23 1400   Wound Width (cm) 2.5 cm 05/05/23 1400   Wound Depth (cm) 0.1 cm 05/05/23 1400   Wound Surface Area (cm^2) 6.25 cm^2 05/05/23 1400   Wound Volume (cm^3) 0.625 cm^3 05/05/23 1400   Post-Procedure Length (cm) 2.7 cm 05/05/23 1400   Post-Procedure Width (cm) 2.5 cm 05/05/23 1400   Post-Procedure Depth (cm) 0.1 cm 05/05/23 1400   Post-Procedure Surface Area (cm^2) 6.75 cm^2 05/05/23 1400   Post-Procedure Volume (cm^3) 0.675 cm^3 05/05/23 1400   Wound Healing % 38 05/05/23 1400   Tunneling (cm) 0 cm 05/05/23 1400   Undermining (cm) 0 cm 05/05/23 1400   Wound Odor None 05/05/23 1400   Exposed Structures None  05/05/23 1400   Number of days: 67       Wound 03/03/23 Full Thickness Wound Leg LLE medial (Active)   Wound Image   05/05/23 1400   Site Assessment Red;Purple;Brown;Boggy;Fragile 05/05/23 1400   Periwound Assessment Purple;Scar tissue;Fragile 05/05/23 1400   Margins Unattached edges 05/05/23 1400   Drainage Amount Moderate 05/05/23 1400   Drainage Description Serosanguineous 05/05/23 1400   Treatments Cleansed;Site care 05/05/23 1400   Wound Cleansing Puracyn [place] 05/05/23 1400   Periwound Protectant Skin Protectant Wipes to Periwound;Barrier Paste 05/05/23 1400   Dressing Cleansing/Solutions Not Applicable 05/05/23 1400   Dressing Options Hydrofiber Silver;Other (Comments);Tubigrip 05/05/23 1400   Dressing Changed Changed 05/05/23 1400   Dressing Change/Treatment Frequency Monday, Wednesday, Friday, and As Needed 03/31/23 1527   Non-staged Wound Description Full thickness 05/05/23 1400   Wound Length (cm) 0.5 cm 05/05/23 1400   Wound Width (cm) 0.5 cm 05/05/23 1400   Wound Depth (cm) 0.5 cm 05/05/23 1400   Wound Surface Area (cm^2) 0.25 cm^2 05/05/23 1400   Wound Volume (cm^3) 0.125 cm^3 05/05/23 1400   Post-Procedure Length (cm) 0.5 cm 04/21/23 1530   Post-Procedure Width (cm) 0.5 cm 04/21/23 1530   Post-Procedure Depth (cm) 0.1 cm 04/21/23 1530   Post-Procedure Surface Area (cm^2) 0.25 cm^2 04/21/23 1530   Post-Procedure Volume (cm^3) 0.025 cm^3 04/21/23 1530   Wound Healing % 58 05/05/23 1400   Tunneling (cm) 0 cm 05/05/23 1400   Undermining (cm) 0 cm 05/05/23 1400   Wound Odor None 05/05/23 1400   Exposed Structures None 05/05/23 1400   Number of days: 63       Wound 03/31/23 Pressure Injury Foot Plantar;Distal Left (Active)   Wound Image    05/05/23 1400   Site Assessment Red;Yellow 05/05/23 1400   Periwound Assessment Callused 05/05/23 1400   Margins Unattached edges 05/05/23 1400   Drainage Amount Moderate 05/05/23 1400   Drainage Description Serosanguineous 05/05/23 1400   Treatments Cleansed;Provider  debridement;Site care 05/05/23 1400   Wound Cleansing Puracyn [place] 05/05/23 1400   Periwound Protectant Skin Protectant Wipes to Periwound;Barrier Paste 05/05/23 1400   Dressing Cleansing/Solutions Not Applicable 05/05/23 1400   Dressing Options Hydrofiber Silver;Other (Comments);Tubigrip 05/05/23 1400   Dressing Changed Changed 05/05/23 1400   WOUND NURSE ONLY - Pressure Injury Stage 3 05/05/23 1400   Wound Length (cm) 0.8 cm 05/05/23 1400   Wound Width (cm) 0.8 cm 05/05/23 1400   Wound Depth (cm) 0.5 cm 05/05/23 1400   Wound Surface Area (cm^2) 0.64 cm^2 05/05/23 1400   Wound Volume (cm^3) 0.32 cm^3 05/05/23 1400   Post-Procedure Length (cm) 0.9 cm 05/05/23 1400   Post-Procedure Width (cm) 0.9 cm 05/05/23 1400   Post-Procedure Depth (cm) 0.5 cm 05/05/23 1400   Post-Procedure Surface Area (cm^2) 0.81 cm^2 05/05/23 1400   Post-Procedure Volume (cm^3) 0.405 cm^3 05/05/23 1400   Wound Healing % -493 05/05/23 1400   Tunneling (cm) 0 cm 05/05/23 1400   Undermining (cm) 0 cm 05/05/23 1400   Wound Odor None 05/05/23 1400   Exposed Structures None 05/05/23 1400   Number of days: 35       Wound 04/21/23 Full Thickness Wound Leg Anterior Left (Active)   Wound Image   05/05/23 1400   Site Assessment Brown;Dry;Other (Comment) 05/05/23 1400   Periwound Assessment Edema;Fragile 05/05/23 1400   Margins Attached edges 04/28/23 1400   Drainage Amount None 05/05/23 1400   Drainage Description Serosanguineous 04/21/23 1530   Treatments Cleansed;Site care 05/05/23 1400   Wound Cleansing Foam Cleanser/Washcloth 05/05/23 1400   Periwound Protectant Skin Moisturizer 05/05/23 1400   Dressing Cleansing/Solutions Not Applicable 05/05/23 1400   Dressing Options Open to Air 05/05/23 1400   Non-staged Wound Description Full thickness 04/21/23 1530   Wound Length (cm) 0.4 cm 04/21/23 1530   Wound Width (cm) 0.3 cm 04/21/23 1530   Wound Depth (cm) 0.1 cm 04/21/23 1530   Wound Surface Area (cm^2) 0.12 cm^2 04/21/23 1530   Wound Volume (cm^3)  0.012 cm^3 04/21/23 1530   Tunneling (cm) 0 cm 04/21/23 1530   Undermining (cm) 0 cm 04/21/23 1530   Wound Odor None 05/05/23 1400   Exposed Structures None 05/05/23 1400   Number of days: 14       PROCEDURE: Excisional debridement of sacral wound  -2% viscous lidocaine applied topically to wound bed for approximately 5 minutes prior to debridement  -Curette used to debride wound bed.  Excisional debridement was performed to remove devitalized tissue until healthy, bleeding tissue was visualized.   Entire surface of wound, 6.48 cm² debrided.  Tissue debrided into the muscle / fascia layer.    -Bleeding controlled with manual pressure.    -Wound care completed by wound RN, refer to flowsheet  -Patient tolerated the procedure well, without c/o pain or discomfort.      PROCEDURE: Excisional debridement of left heel and left foot plantar wound  -2% viscous lidocaine applied topically to wound bed for approximately 5 minutes prior to debridement  -Curette used to debride wound bed.  Excisional debridement was performed to remove devitalized tissue until healthy, bleeding tissue was visualized.   Entire surface of wound, 7.56 cm² debrided.  Tissue debrided into the subcutaneous layer.    -Bleeding controlled with manual pressure.    -Wound care completed by wound RN, refer to flowsheet  -Patient tolerated the procedure well, without c/o pain or discomfort.        BIOLOGIC LOG  5/20/2022-first application.  PRODUCT: EpiCord 2 x 3 cm . PRODUCT #[id number]; EXPIRES: 2026-12-01 5/27/2022- second application.  PRODUCT: EpiCordEX 2 x 3 cm . PRODUCT #[id number]; EXPIRES: 2026-09-01    6/3/2022- third application.  PRODUCT: EpiCordEX 2 x 3 cm . PRODUCT #[id number]; EXPIRES: 2026-10-01    6/10/2022- fourth application.  PRODUCT: EpiCordEX 2 x 3 cm . PRODUCT #[id number]; EXPIRES: 2026-09-01 6/17/2022- fifth application.  PRODUCT: EpiCordEX 2 x 3 cm . PRODUCT #[id number]; EXPIRES:  2027-02-01 6/24/2022- sixth application.  PRODUCT: EpiCordEX 2 x 3 cm . PRODUCT #[id number]; EXPIRES: 2027-02-01 07/01/22: Seventh application.  Product: Epicort Dx 2.0 x 3.0 cm reorder number [id number]; product number [id number]; expires 2027-02-01 7/22/2022- eighth application.  PRODUCT: EpiCord 2 x 3 cm, reorder #[id number]. PRODUCT #[id number]; EXPIRES: 2027-02-01      Pertinent Labs and Diagnostics:    Labs:     A1c: No results found for: HBA1C     [facility] results, 7/1/2022 (under media tab)    CRP 13    ESR 31      IMAGING:     10/3/2022-CT of pelvis with contrast  IMPRESSION:     1.  Posterior soft tissue sacral wound and 1.5 x 3.7 cm abscess.   2.  Progressive destruction of the distal sacrum and proximal coccyx. Sclerosis and irregularity of the distal sacrum consistent with osteomyelitis.   3.  Old wound within the soft tissues posterior to the distal left SI joint with possible fistulous communication.    10/3/2022-CT of tib-fib with and without contrast, with reconstruction  IMPRESSION:     1.  Old fractures of the left tibia and fibula with extensive callus formation and bony bridging as well as extensive dystrophic calcifications. Similar to previous.   2.  Distal medial soft tissue wounds with ill-defined superficial in the soft tissue thickening and fluid collections suggestive of phlegmon. No organized abscess identified.   3.  No definite osteomyelitis.   4.  Arthritic changes.        VASCULAR STUDIES: No results found.    LAST  WOUND CULTURE:   Lab Results   Component Value Date/Time    CULTRSULT Mixed enteric ade >100,000 cfu/mL 04/15/2023 04:37 PM      PATHOLOGY  2/17/2023-bone fragment extracted from left lower extremity wound\\  FINAL DIAGNOSIS:     A. Left leg bone fragment at base of chronic wound:          Extensively degenerated fibrocartilaginous tissue with a rim of           fibrinopurulent debris          Correlate with culture findings            Comment:  While no residual intact bone is identified, these           findings are suggestive of adjacent osteomyelitis.      ASSESSMENT AND PLAN:     1. Sacral decubitus ulcer, stage IV ([facility])  Comments: Ulcer first noted in early April 2022 as small open area which quickly enlarged.  This ulcer is present distal from previous sacral ulcer which healed after surgery in January.  Patient has history of flap reconstruction x2 to this area.  CT shows progressive destruction of distal sacrum and proximal coccyx.    5/5/2023: Wound again larger in clinic, bone palpable, less granulation tissue with thin layer of slough.  -Excisional debridement of wound in clinic today, medically necessary to promote wound healing.  - Patient is trying to offload more, but spends too much time in wheelchair.  - Patient does not currently have follow up with Dr. Saini. If wound deteriorates or fails to improve can consider re-establishing with Dr. Saini for evaluation for coverage options. Patient does not want to pursue at this time and is happy with current conservative approach even with wound worsening.  -Roho cushion in wheelchair     Wound care: Hydrofiber silver strip, hydrofiber silver, Mepilex    2. Postoperative wound dehiscence, subsequent encounter  Comments: On 4/26/2022 patient underwent irrigation and debridement of multiple compartments of the left lower extremity states for pressure injury, with bone excision, and complex closure of chronic wound using biologic skin substitute.  During postop visit in surgeons office on 5/11, surgical site was noted to be dehisced.  Patient was referred to wound clinic for management.    5/5/2023: Wound stable, mostly epithelialized though tissue quality is very poor and remains boggy.  - Tissue removed from wound on 2/17 was found to be extensively degenerated fibrocartilaginous tissue with rim of fibropurulent debris which is suggestive of adjacent OM.  - Xray on 3/7 demonstrated no acute  pathology and only old fracture and deformity. Did not suggest acute OM.  -Patient does not wish to consider surgical options at this time.  Wound care: Hydrofiber silver, silicone adhesive foam, tubigrip    3. Deep tissue injury  4. Pressure injury of left foot, stage III  Comments: DTI to posterior left lower leg first observed in clinic 7/29/2022.  Patient does not know how it started, had been wearing heel float boot consistently.    5/5/2023: Area of DTI to posterior leg remains stable.    - Pressure injury foot is measuring smaller.  -Continue to monitor for any deterioration in tissue quality  -Patient is currently wearing Prevalon boots to help prevent pressure injuries to bilateral lower extremities, however wounds continue to worsen.    Wound care: Hydrofiber silver, Mepilex, Tubigrip D    5. Pressure injury of left heel, stage 3 ([facility])  Comments: First noted in clinic on 10/21/2022, originally noted to be stage II    5/5/2023: Wound is measuring smaller, superficial.  - Excisional debridement was performed in clinic, medically necessary to promote wound healing.  - Patient wound is smaller as he is offloading while in bed with pillow under left leg to float heel.  - Heel does not appear to contact any solid surfaces while in wheelchair  - Suspected pressure injury to plantar foot due to PRAFO boot, he has not used since.     Wound Care: Hydrofiber Silver, Heel Mepilex to reduce pressure and friction    6. Quadriplegia, C5-C7, incomplete ([facility])  Comments: Complicating factor.  Impaired mobility and sensation  -Patient is still spending 7-8 hours/day up in his wheelchair, though he does have appropriate offloading cushion, and knows to reposition frequently.  Wears heel float boots bilaterally at all times        Please note that this note may have been created using voice recognition software. I have worked with technical experts from [employer] to optimize the interface.  I have made every reasonable  attempt to correct obvious errors, but there may be errors of grammar and possibly content that I did not discover before finalizing the note.

## 2023-05-05 NOTE — PATIENT INSTRUCTIONS
Should you experience any significant changes in your wound(s) such as infection (redness, swelling, localized heat, increased pain, fever >101 F, chills) or have any questions regarding your home care instructions, please contact the wound center [phone]. If after hours, contact your primary care physician or go the hospital emergency room.  Keep dressing clean and dry and cover while bathing. Only change dressing if over saturated, soiled or its falling off.
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
## 2023-05-12 ENCOUNTER — OFFICE VISIT (OUTPATIENT)
Dept: WOUND CARE | Facility: MEDICAL CENTER | Age: 73
End: 2023-05-12
Attending: INTERNAL MEDICINE
Payer: MEDICARE

## 2023-05-12 VITALS
HEART RATE: 78 BPM | RESPIRATION RATE: 18 BRPM | OXYGEN SATURATION: 94 % | TEMPERATURE: 99.4 F | SYSTOLIC BLOOD PRESSURE: 94 MMHG | DIASTOLIC BLOOD PRESSURE: 57 MMHG

## 2023-05-12 DIAGNOSIS — L89.154 SACRAL DECUBITUS ULCER, STAGE IV (HCC): ICD-10-CM

## 2023-05-12 DIAGNOSIS — G82.54 QUADRIPLEGIA, C5-C7 INCOMPLETE (HCC): ICD-10-CM

## 2023-05-12 DIAGNOSIS — T81.31XD POSTOPERATIVE WOUND DEHISCENCE, SUBSEQUENT ENCOUNTER: ICD-10-CM

## 2023-05-12 DIAGNOSIS — L89.893 PRESSURE INJURY OF LEFT FOOT, STAGE 3 (HCC): ICD-10-CM

## 2023-05-12 DIAGNOSIS — L89.623 PRESSURE INJURY OF LEFT HEEL, STAGE 3 (HCC): ICD-10-CM

## 2023-05-12 DIAGNOSIS — T14.8XXA DEEP TISSUE INJURY: ICD-10-CM

## 2023-05-12 PROCEDURE — 11043 DBRDMT MUSC&/FSCA 1ST 20/<: CPT

## 2023-05-12 PROCEDURE — 11043 DBRDMT MUSC&/FSCA 1ST 20/<: CPT | Performed by: NURSE PRACTITIONER

## 2023-05-12 PROCEDURE — 3074F SYST BP LT 130 MM HG: CPT | Performed by: NURSE PRACTITIONER

## 2023-05-12 PROCEDURE — 11042 DBRDMT SUBQ TIS 1ST 20SQCM/<: CPT | Mod: 59 | Performed by: NURSE PRACTITIONER

## 2023-05-12 PROCEDURE — 3078F DIAST BP <80 MM HG: CPT | Performed by: NURSE PRACTITIONER

## 2023-05-12 PROCEDURE — 11042 DBRDMT SUBQ TIS 1ST 20SQCM/<: CPT

## 2023-05-12 NOTE — PROGRESS NOTES
Home Health wound care and ostomy orders entered into Epic and routed to [facility] via [employer].

## 2023-05-12 NOTE — PROGRESS NOTES
Provider Encounter- Pressure Injury        HISTORY OF PRESENT ILLNESS  Wound History:    START OF CARE IN CLINIC: 3/3/2023 (return to clinic after hospitalization)    REFERRING PROVIDER: Sahara Mendez      WOUNDS- Pressure Injury, Sacrococcygeal, stage IV                                                             Surgical wound dehiscence, left medial lower leg-status post surgical I&D of stage IV pressure injury and           biologic application                    Pressure injury, DTI, left posterior lower leg-first observed in clinic 7/29/2022       Pressure injury stage III L heel - first noted 10/21     Right 2nd toe avulsion - first noted 10/21     Skin tag left posterior ear - first noted 10/21     HISTORY: Patient with history of incomplete quadriplegia referred to [facility] for treatment of a stage IV pressure injury.  He has a history of previous pressure injuries to this area, and underwent muscle flaps in 2019, and then again in 2020.  He was seen in the wound clinic in November 2021 for an ulcer proximal from his current ulcer, and pressure injuries to his left posterior lower leg and left heel.  At that time, it was discovered that the patient had retained VAC foam embedded in the wound bed of the sacral wound.  Attempts were made to get him back to his plastic surgeon, though unsuccessful.  In January he underwent surgical removal of VAC sponge along with excisional debridement of his sacral wound by Dr. Chaves.  After the surgery, his wound went on to heal without incident.   In early April 2022, his home health nurse noted a new sacral ulcer, below the previous ulcer which quickly tripled in size over the following weeks.  The ulcer to his left medial lower leg had also deteriorated, with bone visible at the base..  He was hospitalized from 4/22 until 4/27/2022 and underwent surgery with Dr. Raman on 4/26 for irrigation and debridement of multiple compartments of the left lower extremity, bone  excision, and complex closure of chronic wound using biologic skin substitute.   His sacrococcygeal wound was not surgically addressed during this admission.  He was discharged back to his group home, with home health, and referral to outpatient wound clinic for his sacral wound.  He was instructed to follow-up with his surgeon for his lower leg wound.       Postoperatively, the left medial lower leg incision dehisced.  He was seen by his surgeon at [facility] on 5/11.  The surgeon opted to leave remaining sutures in place, and refer him to the wound clinic for treatment of this wound.   Treatment of this wound was initiated in clinic on 5/12.  During this visit was also noted that his heel DTI had resolved, but that he had a new pressure injury to his left posterior lower extremity.     A new pressure injury was noted to patient's right upper buttock/lower back on 5/20/2022.  Wound was linear in shape, skin discolored but intact.     Abrasion noted to left anterior lower leg.  First observed in clinic on 7/22/2022.  Patient states he bumped his leg into his food tray.     Small DTI noted to patient's left lateral lower leg on 7/29/2022.  Skin intact but discolored.     Large area of deep tissue injury noted to patient's left exterior lower leg.  Patient denied any trauma to this area.  Skin intact.  Wound documented.    1/27/2023: Patient was admitted to [facility] from 1/23-1/25/2023 with gross hematuria. He underwent RICHARD which showed watchman device was in place and he was taken off of Xarelto. While hospitalized wound team was consulted. He was referred back to [facility] and home health upon discharge.    Patient was hospitalized at [facility] for pyelonephritis from 2/26 until 3/2/2023, admitted for fever and general malaise.  He was admitted and initially started on linezolid and meropenem for suspected UTI and history of multidrug-resistant organisms.  Urine cultures were negative. ID was consulted, recommended CT of chest and  "abdomen,which were negative for acute findings. However, he was treated with 5 days total course of antibiotics for suspected UTI, and symptoms completely resolved.  During this admission, the inpatient wound team was consulted for treatment of his sacral and lower leg wounds.  A wound culture was taken from his left heel pressure ulcer, negative.  Once stabilized, he was discharged home and referred back to [facility] to resume treatment of his wounds.    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  Never used smokeless tobacco    Patient's problem list, allergies, and current medications reviewed and updated in Epic    Interval History:  Interval History thinned 7/29/2022.  Please see previous notes for complete interval history.     Interval History thinned 1/27/2023. Please see previous note for complete interval history.    Interval History thinned 3/3/2023.  Please see previous notes for complete interval history.      3/3/2023 : Clinic visit with ADILSON Arthur, FNPEGGY-BC, CWDINESHN, CFTRACI.   Patient returns to clinic after hospitalization for UTI.  He states that overall he is feeling well, denies fevers, chills, nausea, vomiting, cough or shortness of breath.    Per last wound clinic note, a loose bone fragment was extracted from the wound bed of his left medial leg wound and sent for pathology.  Histology report described, \"extensively degenerated fibrocartilaginous tissue\", suggestive of adjacent osteomyelitis.  X-ray of tib-fib ordered to assess for OM.    3/10/2023: Clinic visit with Steve Hodges MD. Patient reports feeling in normal state of health. He obtained Xray left tib-fib, " we do not have images, but report states no evidence of acute pathology such as OM. We discussed options for more aggressive treatment, patient would prefer to watch and wait. Left heel remains open since hospitalization despite using Prevalon boots, will prescribe Prafo boot for offloading. Sacrum measures slightly smaller, does not appear significantly changed.    3/24/2023: Clinic visit with Steve Hodges MD. Patient reports feeling well. He obtained PRAFO boot and left heel wound smaller. Rest of wounds are not significantly changed. New linear friction wound buttocks. Patient reports occurred when transitioning from laying to sitting with his low airloss mattress.    3/31/2023 : Clinic visit with Kelly Sandy, ADILSON, FNP-BC, CWDINESHN, CFCN.   Anjum presents today complaining of cold symptoms.  States he has had an upper respiratory illness for a couple of weeks now but feels he is getting better.  He does have a new tender area to his plantar foot with mild discoloration, possibly caused by seam on insole to PRAFO boot.  He has stopped wearing the PRAFO and is now wearing Prevalon boot, and feels this is improving.  The left medial lower leg wound does show some improvement, with decrease in area.  Sacral wound is about the same.  The left heel ulcer is again almost healed, with thin layer of epithelium noted to wound bed, scant drainage.    4/7/2023: Clinic visit with Steve Hodges MD. Patient reports doing well. Excited for seeing family for Lamar and SO is coming to visit. Patient denies any symptoms of infection. His left medial leg wound is covered with thin, fragile epithelium and boggy due to poor quality of underlying tissue rather than abscess or fluid collection. Left plantar foot has opened slightly but appears improving, left heel is smaller. He continues to use Prevalon boots. Patient reports new abrasion to lateral right knee from rubbing on the dashboard, does not appear open. Home health has  been applying foam dressing for padding.    4/21/2023: Clinic visit with Steve Hodges MD. Patient reports feeling well, denies any symptoms of infection. He has been using Prevalon boots 24 hours a day, despite this, he has worsening left heel pressure injury. We discussed other methods and he will try to use pillow under leg to offload heel. We also applied an allyven dressing to plantar foot to reduce friction and pressure and he can try to use PRAFO boot. His heel does not appear to be in contact with any solid surface on his wheelchair. Other wounds are unchanged. Due to worsening left heel wound, will bring back to clinic next week for evaluation.    4/28/2023: Clinic visit with Steve Hodges MD. Patient reports feeling well, denies any fevers or chills. He has been using pillow under leg to completely offload heel at night and both heel / plantar foot wound improved. He reports with the nice weather he has been spending majority of his day up in chair outside. Unfortunately his sacral wound larger and bone palpable. He is unsure if he would want to be evaluated again by surgery for possible flap.    5/5/2023: Clinic visit with Steve Hodges MD. Patient denies any complaints today. He reports that he has spent less time in wheelchair in effort to improve offloading. Patients lower extremity wounds are fairly stable. Sacral wound appears stable with small partial thickness linear wound superior. He does not recall any specific injury.    5/12/2023 : Clinic visit with ADILSON Arthur, FNP-BC, CWDINESHN, CFCN.   Anjum continues to feel well overall.  His wounds continue to wax and wane.  He states that his low-air-loss bed was malfunctioning, was bottoming out, has since been repaired.  He he continues to spend much of his day up in his wheelchair.      REVIEW OF SYSTEMS:   Unchanged from previous wound clinic assessment on 4/28/2023, except as noted in interval history above     PHYSICAL EXAMINATION:   BP  94/57   Pulse 78   Temp 37.4 °C (99.4 °F) (Temporal)   Resp 18   SpO2 94%   Physical Exam  Constitutional:       Appearance: He is obese.   Cardiovascular:      Rate and Rhythm: Normal rate.   Pulmonary:      Effort: Pulmonary effort is normal.   Abdominal:      Comments: Colostomy left lower quadrant   Genitourinary:     Comments: Suprapubic catheter  Skin:     Comments: Stage IV coccygeal pressure injury -wound area has increased since last assessment, granulation tissue at base of wound, bone palpable, moderate serosanguineous drainage, no evidence of infection    Full-thickness wound to left medial lower leg -wound area about the same, poor tissue quality, moderate serosanguineous drainage, no evidence of infection    Stage III pressure injury left posterior heel -wound area has decreased significantly since last assessment, depth is at skin level, moderate serosanguineous drainage, no evidence of infection  Stage III pressure injury plantar foot -wound area about the same, moderate serosanguineous drainage, no evidence of infection      Refer to wound flowsheet and photos   Neurological:      Mental Status: He is alert and oriented to person, place, and time.   Psychiatric:         Mood and Affect: Mood normal.         WOUND ASSESSMENT  Wound 02/27/23 Pressure Injury Coccyx Stage IV (Active)   Wound Image    05/12/23 1400   Site Assessment Red;Yellow 05/12/23 1400   Periwound Assessment Fragile;Scar tissue 05/12/23 1400   Margins Unattached edges 05/12/23 1400   Drainage Amount Large 05/12/23 1400   Drainage Description Serosanguineous 05/12/23 1400   Treatments Cleansed;Provider debridement;Site care 05/12/23 1400   Wound Cleansing Puracyn [place] 05/12/23 1400   Periwound Protectant Skin Protectant Wipes to Periwound;Barrier Paste 05/12/23 1400   Dressing Cleansing/Solutions Not Applicable 05/12/23 1400   Dressing Options Hydrofiber Silver Strip;Hydrofiber Silver;Other (Comments) 05/12/23 1400   Dressing  Changed Changed 05/12/23 1400   Dressing Change/Treatment Frequency Monday, Wednesday, Friday, and As Needed 03/31/23 1527   WOUND NURSE ONLY - Pressure Injury Stage 4 05/05/23 1400   Wound Length (cm) 3.9 cm 05/12/23 1400   Wound Width (cm) 2 cm 05/12/23 1400   Wound Depth (cm) 1.1 cm 05/12/23 1400   Wound Surface Area (cm^2) 7.8 cm^2 05/12/23 1400   Wound Volume (cm^3) 8.58 cm^3 05/12/23 1400   Post-Procedure Length (cm) 3.9 cm 05/12/23 1400   Post-Procedure Width (cm) 2 cm 05/12/23 1400   Post-Procedure Depth (cm) 1.1 cm 05/12/23 1400   Post-Procedure Surface Area (cm^2) 7.8 cm^2 05/12/23 1400   Post-Procedure Volume (cm^3) 8.58 cm^3 05/12/23 1400   Wound Healing % -136 05/12/23 1400   Tunneling (cm) 2.3 cm 05/12/23 1400   Tunneling Clock Position of Wound 12 05/12/23 1400   Undermining (cm) 0 cm 05/12/23 1400   Wound Odor None 05/12/23 1400   Exposed Structures Bone 05/12/23 1400   Number of days: 74       Wound 02/27/23 Heel Posterior Left (Active)   Wound Image    05/12/23 1400   Site Assessment Pink;Red;Yellow 05/12/23 1400   Periwound Assessment Blanchable erythema;Fragile;Scar tissue 05/12/23 1400   Margins Attached edges 05/12/23 1400   Drainage Amount Moderate 05/12/23 1400   Drainage Description Serosanguineous 05/12/23 1400   Treatments Cleansed;Provider debridement;Site care 05/12/23 1400   Wound Cleansing Puracyn [place] 05/12/23 1400   Periwound Protectant Skin Protectant Wipes to Periwound;Barrier Paste 05/12/23 1400   Dressing Cleansing/Solutions Not Applicable 05/12/23 1400   Dressing Options Hydrofiber Silver;Other (Comments);Tubigrip 05/12/23 1400   Dressing Changed Changed 05/12/23 1400   Dressing Change/Treatment Frequency Monday, Wednesday, Friday, and As Needed 03/31/23 1527   WOUND NURSE ONLY - Pressure Injury Stage 3 05/05/23 1400   Wound Length (cm) 1 cm 05/12/23 1400   Wound Width (cm) 0.6 cm 05/12/23 1400   Wound Depth (cm) 0.1 cm 05/12/23 1400   Wound Surface Area (cm^2) 0.6 cm^2  05/12/23 1400   Wound Volume (cm^3) 0.06 cm^3 05/12/23 1400   Post-Procedure Length (cm) 1.2 cm 05/12/23 1400   Post-Procedure Width (cm) 0.8 cm 05/12/23 1400   Post-Procedure Depth (cm) 0.1 cm 05/12/23 1400   Post-Procedure Surface Area (cm^2) 0.96 cm^2 05/12/23 1400   Post-Procedure Volume (cm^3) 0.096 cm^3 05/12/23 1400   Wound Healing % 94 05/12/23 1400   Tunneling (cm) 0 cm 05/12/23 1400   Undermining (cm) 0 cm 05/12/23 1400   Wound Odor None 05/12/23 1400   Exposed Structures None 05/12/23 1400   Number of days: 74       Wound 03/03/23 Full Thickness Wound Leg LLE medial (Active)   Wound Image    05/12/23 1400   Site Assessment Red;Purple;Brown;Boggy;Fragile 05/12/23 1400   Periwound Assessment Purple;Scar tissue;Fragile 05/12/23 1400   Margins Unattached edges 05/12/23 1400   Drainage Amount Moderate 05/12/23 1400   Drainage Description Serosanguineous 05/12/23 1400   Treatments Cleansed;Provider debridement;Site care 05/12/23 1400   Wound Cleansing Puracyn [place] 05/12/23 1400   Periwound Protectant Skin Protectant Wipes to Periwound;Barrier Paste 05/12/23 1400   Dressing Cleansing/Solutions Not Applicable 05/12/23 1400   Dressing Options Hydrofiber Silver;Other (Comments);Tubigrip 05/12/23 1400   Dressing Changed Changed 05/12/23 1400   Dressing Change/Treatment Frequency Monday, Wednesday, Friday, and As Needed 03/31/23 1527   Non-staged Wound Description Full thickness 05/12/23 1400   Wound Length (cm) 0.6 cm 05/12/23 1400   Wound Width (cm) 0.8 cm 05/12/23 1400   Wound Depth (cm) 0.5 cm 05/12/23 1400   Wound Surface Area (cm^2) 0.48 cm^2 05/12/23 1400   Wound Volume (cm^3) 0.24 cm^3 05/12/23 1400   Post-Procedure Length (cm) 0.8 cm 05/12/23 1400   Post-Procedure Width (cm) 0.8 cm 05/12/23 1400   Post-Procedure Depth (cm) 0.5 cm 05/12/23 1400   Post-Procedure Surface Area (cm^2) 0.64 cm^2 05/12/23 1400   Post-Procedure Volume (cm^3) 0.32 cm^3 05/12/23 1400   Wound Healing % 20 05/12/23 1400   Tunneling  (cm) 0 cm 05/12/23 1400   Undermining (cm) 0 cm 05/12/23 1400   Wound Odor None 05/12/23 1400   Exposed Structures None 05/12/23 1400   Number of days: 70       Wound 03/31/23 Pressure Injury Foot Plantar;Distal Left (Active)   Wound Image    05/12/23 1400   Site Assessment Red;Yellow 05/12/23 1400   Periwound Assessment Callused 05/12/23 1400   Margins Unattached edges 05/12/23 1400   Drainage Amount Moderate 05/12/23 1400   Drainage Description Serosanguineous 05/12/23 1400   Treatments Cleansed;Provider debridement;Site care 05/12/23 1400   Wound Cleansing Puracyn [place] 05/12/23 1400   Periwound Protectant Skin Protectant Wipes to Periwound;Barrier Paste;Skin Moisturizer 05/12/23 1400   Dressing Cleansing/Solutions Not Applicable 05/12/23 1400   Dressing Options Hydrofiber Silver;Silicone Adhesive Foam;Tubigrip 05/12/23 1400   Dressing Changed Changed 05/12/23 1400   WOUND NURSE ONLY - Pressure Injury Stage 3 05/05/23 1400   Wound Length (cm) 0.7 cm 05/12/23 1400   Wound Width (cm) 0.8 cm 05/12/23 1400   Wound Depth (cm) 0.3 cm 05/12/23 1400   Wound Surface Area (cm^2) 0.56 cm^2 05/12/23 1400   Wound Volume (cm^3) 0.168 cm^3 05/12/23 1400   Post-Procedure Length (cm) 0.8 cm 05/12/23 1400   Post-Procedure Width (cm) 1 cm 05/12/23 1400   Post-Procedure Depth (cm) 0.3 cm 05/12/23 1400   Post-Procedure Surface Area (cm^2) 0.8 cm^2 05/12/23 1400   Post-Procedure Volume (cm^3) 0.24 cm^3 05/12/23 1400   Wound Healing % -211 05/12/23 1400   Tunneling (cm) 0 cm 05/12/23 1400   Undermining (cm) 0 cm 05/12/23 1400   Wound Odor None 05/12/23 1400   Exposed Structures None 05/12/23 1400   Number of days: 42       Wound 04/21/23 Full Thickness Wound Leg Anterior Left (Active)   Wound Image   05/12/23 1400   Site Assessment Brown;Epithelialization 05/12/23 1400   Periwound Assessment Edema;Fragile 05/12/23 1400   Margins Attached edges 05/12/23 1400   Drainage Amount None 05/12/23 1400   Drainage Description Serosanguineous  04/21/23 1530   Treatments Site care 05/12/23 1400   Wound Cleansing Foam Cleanser/Washcloth 05/12/23 1400   Periwound Protectant Skin Moisturizer 05/12/23 1400   Dressing Cleansing/Solutions Not Applicable 05/12/23 1400   Dressing Options Open to Air 05/12/23 1400   Non-staged Wound Description Full thickness 04/21/23 1530   Wound Length (cm) 0 cm 05/12/23 1400   Wound Width (cm) 0 cm 05/12/23 1400   Wound Depth (cm) 0 cm 05/12/23 1400   Wound Surface Area (cm^2) 0 cm^2 05/12/23 1400   Wound Volume (cm^3) 0 cm^3 05/12/23 1400   Post-Procedure Length (cm) 0 cm 05/12/23 1400   Post-Procedure Width (cm) 0 cm 05/12/23 1400   Post-Procedure Depth (cm) 0 cm 05/12/23 1400   Post-Procedure Surface Area (cm^2) 0 cm^2 05/12/23 1400   Post-Procedure Volume (cm^3) 0 cm^3 05/12/23 1400   Wound Healing % 100 05/12/23 1400   Tunneling (cm) 0 cm 05/12/23 1400   Undermining (cm) 0 cm 05/12/23 1400   Wound Odor None 05/12/23 1400   Exposed Structures None 05/12/23 1400   Number of days: 21       PROCEDURE: Excisional debridement of sacral wound  -2% viscous lidocaine applied topically to wound bed for approximately 5 minutes prior to debridement  -Curette used to debride wound bed.  Excisional debridement was performed to remove devitalized tissue until healthy, bleeding tissue was visualized.   Entire surface of wound, 7.8 cm² debrided.  Tissue debrided into the muscle / fascia layer.    -Bleeding controlled with manual pressure.    -Wound care completed by wound RN, refer to flowsheet  -Patient tolerated the procedure well, without c/o pain or discomfort.      PROCEDURE: Excisional debridement of left medial lower leg wound, left heel and left foot plantar wound  -2% viscous lidocaine applied topically to wound bed for approximately 5 minutes prior to debridement  -Curette used to debride wound bed.  Excisional debridement was performed to remove devitalized tissue until healthy, bleeding tissue was visualized.   Entire surface of  wound, 2.4 cm² debrided.  Tissue debrided into the subcutaneous layer.    -Bleeding controlled with manual pressure.    -Wound care completed by wound RN, refer to flowsheet  -Patient tolerated the procedure well, without c/o pain or discomfort.        BIOLOGIC LOG  5/20/2022-first application.  PRODUCT: EpiCord 2 x 3 cm . PRODUCT #[id number]; EXPIRES: 2026-12-01 5/27/2022- second application.  PRODUCT: EpiCordEX 2 x 3 cm . PRODUCT #[id number]; EXPIRES: 2026-09-01    6/3/2022- third application.  PRODUCT: EpiCordEX 2 x 3 cm . PRODUCT #[id number]; EXPIRES: 2026-10-01    6/10/2022- fourth application.  PRODUCT: EpiCordEX 2 x 3 cm . PRODUCT #[id number]; EXPIRES: 2026-09-01 6/17/2022- fifth application.  PRODUCT: EpiCordEX 2 x 3 cm . PRODUCT #[id number]; EXPIRES: 2027-02-01 6/24/2022- sixth application.  PRODUCT: EpiCordEX 2 x 3 cm . PRODUCT #[id number]; EXPIRES: 2027-02-01 07/01/22: Seventh application.  Product: Epicort Dx 2.0 x 3.0 cm reorder number [id number]; product number [id number]; expires 2027-02-01 7/22/2022- eighth application.  PRODUCT: EpiCord 2 x 3 cm, reorder #[id number]. PRODUCT #[id number]; EXPIRES: 2027-02-01      Pertinent Labs and Diagnostics:    Labs:     A1c: No results found for: HBA1C     [facility] results, 7/1/2022 (under media tab)    CRP 13    ESR 31      IMAGING:     10/3/2022-CT of pelvis with contrast  IMPRESSION:     1.  Posterior soft tissue sacral wound and 1.5 x 3.7 cm abscess.   2.  Progressive destruction of the distal sacrum and proximal coccyx. Sclerosis and irregularity of the distal sacrum consistent with osteomyelitis.   3.  Old wound within the soft tissues posterior to the distal left SI joint with possible fistulous communication.    10/3/2022-CT of tib-fib with and without contrast, with reconstruction  IMPRESSION:     1.  Old fractures of the left tibia and fibula with extensive callus formation and bony  bridging as well as extensive dystrophic calcifications. Similar to previous.   2.  Distal medial soft tissue wounds with ill-defined superficial in the soft tissue thickening and fluid collections suggestive of phlegmon. No organized abscess identified.   3.  No definite osteomyelitis.   4.  Arthritic changes.        VASCULAR STUDIES: No results found.    LAST  WOUND CULTURE:   Lab Results   Component Value Date/Time    CULTRSULT Mixed enteric ade >100,000 cfu/mL 04/15/2023 04:37 PM      PATHOLOGY  2/17/2023-bone fragment extracted from left lower extremity wound\\  FINAL DIAGNOSIS:     A. Left leg bone fragment at base of chronic wound:          Extensively degenerated fibrocartilaginous tissue with a rim of           fibrinopurulent debris          Correlate with culture findings            Comment: While no residual intact bone is identified, these           findings are suggestive of adjacent osteomyelitis.      ASSESSMENT AND PLAN:     1. Sacral decubitus ulcer, stage IV ([facility])  Comments: Ulcer first noted in early April 2022 as small open area which quickly enlarged.  This ulcer is present distal from previous sacral ulcer which healed after surgery in January.  Patient has history of flap reconstruction x2 to this area.  CT shows progressive destruction of distal sacrum and proximal coccyx.    5/12/2023: Wound area has again increased, bone palpable at base of wound, areas of new granulation, no evidence of infection.  Healing of this wound complicated by patient reluctance to offload, impaired mobility, and lack of sensation.  -Excisional debridement of wound in clinic today, medically necessary to promote wound healing.  -Home health to change dressing 1-2 times per week in between clinic visits  -Patient admits to spending most of his day up in the wheelchair.  He has been advised to minimize time out of bed  - Patient does not currently have follow up with Dr. Saini. If wound deteriorates or fails to  improve can consider re-establishing with Dr. Saini for evaluation for coverage options. Patient does not want to pursue at this time and is happy with current conservative approach even with wound worsening.  -Carloso cushion in wheelchair, appears to be in good condition    Wound care: Hydrofiber silver strip, hydrofiber silver, Mepilex    2. Postoperative wound dehiscence, subsequent encounter  Comments: On 4/26/2022 patient underwent irrigation and debridement of multiple compartments of the left lower extremity states for pressure injury, with bone excision, and complex closure of chronic wound using biologic skin substitute.  During postop visit in surgeons office on 5/11, surgical site was noted to be dehisced.  Patient was referred to wound clinic for management.    5/12/2023: Area of this wound continues to wax and wane.  Poor tissue quality  -Excisional debridement of wound in clinic today, medically necessary to promote wound healing.  -Patient to return to clinic weekly for assessment and debridement  -Home health to change dressing 1-2 times per week in between clinic visits OM.  -Patient does not wish to consider surgical options at this time.  Wound care: Hydrofiber silver, silicone adhesive foam, tubigrip    3. Deep tissue injury  4. Pressure injury of left foot, stage III  Comments: DTI to posterior left lower leg first observed in clinic 7/29/2022.  Patient does not know how it started, had been wearing heel float boot consistently.    5/12/2023: Area of DTI to posterior lower extremity remained stable, skin intact.  Ulcer to the plantar fifth MTH measures slightly larger today, no evidence of infection  -Excisional debridement of wound in clinic today, medically necessary to promote wound healing.  -Patient to return to clinic weekly for assessment and debridement  -Home health to change dressing 1-2 times per week in between clinic visits     Wound care: Hydrofiber silver, Mepilex, Tubigrip D    5.  Pressure injury of left heel, stage 3 ([facility])  Comments: First noted in clinic on 10/21/2022, originally noted to be stage II    5/12/2023: Wound area has decreased significantly, depth is at skin level.  - Excisional debridement was performed in clinic, medically necessary to promote wound healing.  - Patient continue wearing heel float boots at all times  - Heel does not appear to contact any solid surfaces while in wheelchair       Wound Care: Hydrofiber Silver, Heel Mepilex to reduce pressure and friction    6. Quadriplegia, C5-C7, incomplete ([facility])  Comments: Complicating factor.  Impaired mobility and sensation  -Patient is still spending 7-8 hours/day up in his wheelchair, though he does have appropriate offloading cushion, and knows to reposition frequently.  Wears heel float boots bilaterally at all times        Please note that this note may have been created using voice recognition software. I have worked with technical experts from [facility] to optimize the interface.  I have made every reasonable attempt to correct obvious errors, but there may be errors of grammar and possibly content that I did not discover before finalizing the note.

## 2023-05-12 NOTE — PATIENT INSTRUCTIONS
-Keep your wound dressing clean, dry, and intact.    -Change your dressing if it becomes soiled, soaked, or falls off.    -Should you experience any significant changes in your wound(s), such as infection (redness, swelling, localized heat, increased pain, fever > 101 F, chills) or have any questions regarding your home care instructions, please contact the wound center at [phone]. If after hours, contact your primary care physician or go to the hospital emergency room.

## 2023-05-18 ENCOUNTER — NON-PROVIDER VISIT (OUTPATIENT)
Dept: CARDIOLOGY | Facility: MEDICAL CENTER | Age: 73
End: 2023-05-18
Payer: MEDICARE

## 2023-05-18 PROCEDURE — 93298 REM INTERROG DEV EVAL SCRMS: CPT | Performed by: INTERNAL MEDICINE

## 2023-05-18 NOTE — CARDIAC REMOTE MONITOR - SCAN
Device transmission reviewed. Device demonstrated appropriate function.       Electronically Signed by: Briana Bell M.D.    5/23/2023  8:17 AM

## 2023-05-19 ENCOUNTER — OFFICE VISIT (OUTPATIENT)
Dept: WOUND CARE | Facility: MEDICAL CENTER | Age: 73
End: 2023-05-19
Attending: INTERNAL MEDICINE
Payer: MEDICARE

## 2023-05-19 VITALS
SYSTOLIC BLOOD PRESSURE: 149 MMHG | HEART RATE: 70 BPM | TEMPERATURE: 97.6 F | DIASTOLIC BLOOD PRESSURE: 83 MMHG | RESPIRATION RATE: 18 BRPM

## 2023-05-19 DIAGNOSIS — L89.623 PRESSURE INJURY OF LEFT HEEL, STAGE 3 (HCC): ICD-10-CM

## 2023-05-19 DIAGNOSIS — L89.893 PRESSURE INJURY OF LEFT FOOT, STAGE 3 (HCC): ICD-10-CM

## 2023-05-19 DIAGNOSIS — T81.31XD POSTOPERATIVE WOUND DEHISCENCE, SUBSEQUENT ENCOUNTER: ICD-10-CM

## 2023-05-19 DIAGNOSIS — G82.54 QUADRIPLEGIA, C5-C7 INCOMPLETE (HCC): ICD-10-CM

## 2023-05-19 DIAGNOSIS — L89.154 SACRAL DECUBITUS ULCER, STAGE IV (HCC): ICD-10-CM

## 2023-05-19 DIAGNOSIS — T14.8XXA DEEP TISSUE INJURY: ICD-10-CM

## 2023-05-19 PROCEDURE — 3079F DIAST BP 80-89 MM HG: CPT | Performed by: NURSE PRACTITIONER

## 2023-05-19 PROCEDURE — 11042 DBRDMT SUBQ TIS 1ST 20SQCM/<: CPT | Mod: 59 | Performed by: NURSE PRACTITIONER

## 2023-05-19 PROCEDURE — 11042 DBRDMT SUBQ TIS 1ST 20SQCM/<: CPT

## 2023-05-19 PROCEDURE — 3077F SYST BP >= 140 MM HG: CPT | Performed by: NURSE PRACTITIONER

## 2023-05-19 PROCEDURE — 11043 DBRDMT MUSC&/FSCA 1ST 20/<: CPT | Performed by: NURSE PRACTITIONER

## 2023-05-19 PROCEDURE — 11043 DBRDMT MUSC&/FSCA 1ST 20/<: CPT

## 2023-05-19 NOTE — PATIENT INSTRUCTIONS
-Keep your wound dressing clean, dry, and intact.    -Change your dressing if it becomes soiled, soaked, or falls off.    -Should you experience any significant changes in your wound(s), such as infection (redness, swelling, localized heat, increased pain, fever > 101 F, chills) or have any questions regarding your home care instructions, please contact the wound center at [phone]. If after hours, contact your primary care physician or go to the hospital emergency room.

## 2023-05-19 NOTE — PROGRESS NOTES
Provider Encounter- Pressure Injury        HISTORY OF PRESENT ILLNESS  Wound History:    START OF CARE IN CLINIC: 3/3/2023 (return to clinic after hospitalization)    REFERRING PROVIDER: Sahara Mendez      WOUNDS- Pressure Injury, Sacrococcygeal, stage IV                                                             Surgical wound dehiscence, left medial lower leg-status post surgical I&D of stage IV pressure injury and           biologic application                    Pressure injury, DTI, left posterior lower leg-first observed in clinic 7/29/2022       Pressure injury stage III L heel - first noted 10/21     Right 2nd toe avulsion - first noted 10/21     Skin tag left posterior ear - first noted 10/21     HISTORY: Patient with history of incomplete quadriplegia referred to [facility] for treatment of a stage IV pressure injury.  He has a history of previous pressure injuries to this area, and underwent muscle flaps in 2019, and then again in 2020.  He was seen in the wound clinic in November 2021 for an ulcer proximal from his current ulcer, and pressure injuries to his left posterior lower leg and left heel.  At that time, it was discovered that the patient had retained VAC foam embedded in the wound bed of the sacral wound.  Attempts were made to get him back to his plastic surgeon, though unsuccessful.  In January he underwent surgical removal of VAC sponge along with excisional debridement of his sacral wound by Dr. Chaves.  After the surgery, his wound went on to heal without incident.   In early April 2022, his home health nurse noted a new sacral ulcer, below the previous ulcer which quickly tripled in size over the following weeks.  The ulcer to his left medial lower leg had also deteriorated, with bone visible at the base..  He was hospitalized from 4/22 until 4/27/2022 and underwent surgery with Dr. Raman on 4/26 for irrigation and debridement of multiple compartments of the left lower extremity, bone  excision, and complex closure of chronic wound using biologic skin substitute.   His sacrococcygeal wound was not surgically addressed during this admission.  He was discharged back to his group home, with home health, and referral to outpatient wound clinic for his sacral wound.  He was instructed to follow-up with his surgeon for his lower leg wound.       Postoperatively, the left medial lower leg incision dehisced.  He was seen by his surgeon at [facility] on 5/11.  The surgeon opted to leave remaining sutures in place, and refer him to the wound clinic for treatment of this wound.   Treatment of this wound was initiated in clinic on 5/12.  During this visit was also noted that his heel DTI had resolved, but that he had a new pressure injury to his left posterior lower extremity.     A new pressure injury was noted to patient's right upper buttock/lower back on 5/20/2022.  Wound was linear in shape, skin discolored but intact.     Abrasion noted to left anterior lower leg.  First observed in clinic on 7/22/2022.  Patient states he bumped his leg into his food tray.     Small DTI noted to patient's left lateral lower leg on 7/29/2022.  Skin intact but discolored.     Large area of deep tissue injury noted to patient's left exterior lower leg.  Patient denied any trauma to this area.  Skin intact.  Wound documented.    1/27/2023: [facility]1/25/2023 with gross hematuria. He underwent RICHARD which showed watchman device was in place and he was taken off of Xarelto. While hospitalized wound team was consulted. He was referred back to [facility] and home health upon discharge.    Patient was hospitalized at [facility] for pyelonephritis from 2/26 until 3/2/2023, admitted for fever and general malaise.  He was admitted and initially started on linezolid and meropenem for suspected UTI and history of multidrug-resistant organisms.  Urine cultures were negative. ID was consulted, recommended CT of chest and  "abdomen,which were negative for acute findings. However, he was treated with 5 days total course of antibiotics for suspected UTI, and symptoms completely resolved.  During this admission, the inpatient wound team was consulted for treatment of his sacral and lower leg wounds.  A wound culture was taken from his left heel pressure ulcer, negative.  Once stabilized, he was discharged home and referred back to [facility] to resume treatment of his wounds.    Pertinent Medical History: Incomplete quadriplegia, history of stage IV pressure injuries, history of flap procedures to sacral pressure injuries, osteomyelitis, obesity, colostomy in place   Contributing factors: Immobility and Obesity, impaired sensation    Personal support: Attendant-staff at [facility] and home health nursing    TOBACCO USE:   Former smoker, quit in 1977.  Never used smokeless tobacco    Patient's problem list, allergies, and current medications reviewed and updated in Epic    Interval History:  Interval History thinned 7/29/2022.  Please see previous notes for complete interval history.     Interval History thinned 1/27/2023. Please see previous note for complete interval history.    Interval History thinned 3/3/2023.  Please see previous notes for complete interval history.      3/3/2023 : Clinic visit with ADILSON Arthur, FNPEGGY-BC, CWDINESHN, CFTRACI.   Patient returns to clinic after hospitalization for UTI.  He states that overall he is feeling well, denies fevers, chills, nausea, vomiting, cough or shortness of breath.    Per last wound clinic note, a loose bone fragment was extracted from the wound bed of his left medial leg wound and sent for pathology.  Histology report described, \"extensively degenerated fibrocartilaginous tissue\", suggestive of adjacent osteomyelitis.  X-ray of tib-fib ordered to assess for OM.    3/10/2023: Clinic visit with Steve Hodges MD. Patient reports feeling in normal state of health. He obtained Xray left tib-fib, " we do not have images, but report states no evidence of acute pathology such as OM. We discussed options for more aggressive treatment, patient would prefer to watch and wait. Left heel remains open since hospitalization despite using Prevalon boots, will prescribe Prafo boot for offloading. Sacrum measures slightly smaller, does not appear significantly changed.    3/24/2023: Clinic visit with Steve Hodges MD. Patient reports feeling well. He obtained PRAFO boot and left heel wound smaller. Rest of wounds are not significantly changed. New linear friction wound buttocks. Patient reports occurred when transitioning from laying to sitting with his low airloss mattress.    3/31/2023 : Clinic visit with Kelly Sandy, ADILSON, FNP-BC, CWDINESHN, CFCN.   Anjum presents today complaining of cold symptoms.  States he has had an upper respiratory illness for a couple of weeks now but feels he is getting better.  He does have a new tender area to his plantar foot with mild discoloration, possibly caused by seam on insole to PRAFO boot.  He has stopped wearing the PRAFO and is now wearing Prevalon boot, and feels this is improving.  The left medial lower leg wound does show some improvement, with decrease in area.  Sacral wound is about the same.  The left heel ulcer is again almost healed, with thin layer of epithelium noted to wound bed, scant drainage.    4/7/2023: Clinic visit with Steve Hodges MD. Patient reports doing well. Excited for seeing family for Lam[place] and SO is coming to visit. Patient denies any symptoms of infection. His left medial leg wound is covered with thin, fragile epithelium and boggy due to poor quality of underlying tissue rather than abscess or fluid collection. Left plantar foot has opened slightly but appears improving, left heel is smaller. He continues to use Prevalon boots. Patient reports new abrasion to lateral right knee from rubbing on the dashboard, does not appear open. Home health has  been applying foam dressing for padding.    4/21/2023: Clinic visit with Steve Hodges MD. Patient reports feeling well, denies any symptoms of infection. He has been using Prevalon boots 24 hours a day, despite this, he has worsening left heel pressure injury. We discussed other methods and he will try to use pillow under leg to offload heel. We also applied an allyven dressing to plantar foot to reduce friction and pressure and he can try to use PRAFO boot. His heel does not appear to be in contact with any solid surface on his wheelchair. Other wounds are unchanged. Due to worsening left heel wound, will bring back to clinic next week for evaluation.    4/28/2023: Clinic visit with Steve Hodges MD. Patient reports feeling well, denies any fevers or chills. He has been using pillow under leg to completely offload heel at night and both heel / plantar foot wound improved. He reports with the nice weather he has been spending majority of his day up in chair outside. Unfortunately his sacral wound larger and bone palpable. He is unsure if he would want to be evaluated again by surgery for possible flap.    5/5/2023: Clinic visit with Steve Hodges MD. Patient denies any complaints today. He reports that he has spent less time in wheelchair in effort to improve offloading. Patients lower extremity wounds are fairly stable. Sacral wound appears stable with small partial thickness linear wound superior. He does not recall any specific injury.    5/12/2023 : Clinic visit with ADILSON Arthur, HOLDEN, IMAN, LILIYA.   Anjum continues to feel well overall.  His wounds continue to wax and wane.  He states that his low-air-loss bed was malfunctioning, was bottoming out, has since been repaired.  He continues to spend much of his day up in his wheelchair.    5/19/2023 : Clinic visit with ADILSON Arthur, HOLDEN, IMAN, LILIYA.   Anjum states he is feeling very well today.  Sacral wound about the same, lateral leg wound  continues to wax and wane.  Heel wound is improving steadily.  Plantar foot wound slowly progressing.      REVIEW OF SYSTEMS:   Unchanged from previous wound clinic assessment on 5/12/2023, except as noted in interval history above     PHYSICAL EXAMINATION:   BP (!) 149/83   Pulse 70   Temp 36.4 °C (97.6 °F) (Temporal)   Resp 18   Physical Exam  Constitutional:       Appearance: He is obese.   Cardiovascular:      Rate and Rhythm: Normal rate.   Pulmonary:      Effort: Pulmonary effort is normal.   Abdominal:      Comments: Colostomy left lower quadrant   Genitourinary:     Comments: Suprapubic catheter  Skin:     Comments: Stage IV coccygeal pressure injury -wound area measures smaller today, however depth is increased.  Discrepancy possibly due to positioning of patient.  Wound bed appears to be healthy, with new granulation tissue.  Depth still probes close to bone.  No periwound erythema or induration, moderate serosanguineous drainage, no odor    Full-thickness wound to left medial lower leg -wound area has increased last assessment, poor tissue quality, moderate serosanguineous drainage.    Stage III pressure injury left posterior heel -wound area has again decreased significantly since last assessment, moderate serosanguineous drainage, no evidence of infection  Stage III pressure injury plantar foot -wound area has decreased since last assessment, depth about the same, moderate serosanguineous drainage, no evidence of infection      Refer to wound flowsheet and photos   Neurological:      Mental Status: He is alert and oriented to person, place, and time.   Psychiatric:         Mood and Affect: Mood normal.         WOUND ASSESSMENT  Wound 02/27/23 Pressure Injury Coccyx Stage IV (Active)   Wound Image    05/19/23 1400   Site Assessment Red;Yellow 05/19/23 1400   Periwound Assessment Fragile;Scar tissue 05/19/23 1400   Margins Unattached edges 05/19/23 1400   Drainage Amount Large 05/19/23 1400   Drainage  Description Serosanguineous 05/19/23 1400   Treatments Cleansed;Provider debridement;Site care 05/19/23 1400   Wound Cleansing Normal Saline Irrigation 05/19/23 1400   Periwound Protectant Skin Protectant Wipes to Periwound;Barrier Paste 05/19/23 1400   Dressing Cleansing/Solutions Not Applicable 05/19/23 1400   Dressing Options Collagen Dressing;Hydrofiber Silver Strip;Hydrofiber Silver;Other (Comments) 05/19/23 1400   Dressing Changed Changed 05/19/23 1400   Dressing Change/Treatment Frequency Monday, Wednesday, Friday, and As Needed 03/31/23 1527   WOUND NURSE ONLY - Pressure Injury Stage 4 05/19/23 1400   Wound Length (cm) 3.9 cm 05/19/23 1400   Wound Width (cm) 1.5 cm 05/19/23 1400   Wound Depth (cm) 1.5 cm 05/19/23 1400   Wound Surface Area (cm^2) 5.85 cm^2 05/19/23 1400   Wound Volume (cm^3) 8.775 cm^3 05/19/23 1400   Post-Procedure Length (cm) 3.9 cm 05/19/23 1400   Post-Procedure Width (cm) 1.5 cm 05/19/23 1400   Post-Procedure Depth (cm) 1.6 cm 05/19/23 1400   Post-Procedure Surface Area (cm^2) 5.85 cm^2 05/19/23 1400   Post-Procedure Volume (cm^3) 9.36 cm^3 05/19/23 1400   Wound Healing % -141 05/19/23 1400   Tunneling (cm) 2.2 cm 05/19/23 1400   Tunneling Clock Position of Wound 12 05/19/23 1400   Undermining (cm) 0 cm 05/19/23 1400   Wound Odor None 05/19/23 1400   Exposed Structures Bone 05/19/23 1400   Number of days: 81       Wound 02/27/23 Heel Posterior Left (Active)   Wound Image    05/19/23 1400   Site Assessment Pink;Red;Yellow 05/19/23 1400   Periwound Assessment Blanchable erythema;Fragile;Scar tissue 05/19/23 1400   Margins Attached edges 05/19/23 1400   Drainage Amount Small 05/19/23 1400   Drainage Description Serosanguineous 05/19/23 1400   Treatments Cleansed;Provider debridement 05/19/23 1400   Wound Cleansing Puracyn [place] 05/19/23 1400   Periwound Protectant Skin Protectant Wipes to Periwound;Barrier Paste 05/19/23 1400   Dressing Cleansing/Solutions Not Applicable 05/19/23 1400    Dressing Options Hydrofiber Silver;Other (Comments);Tubigrip 05/19/23 1400   Dressing Changed Changed 05/19/23 1400   Dressing Change/Treatment Frequency Monday, Wednesday, Friday, and As Needed 03/31/23 1527   WOUND NURSE ONLY - Pressure Injury Stage 3 05/19/23 1400   Wound Length (cm) 1 cm 05/19/23 1400   Wound Width (cm) 0.7 cm 05/19/23 1400   Wound Depth (cm) 0 cm 05/19/23 1400   Wound Surface Area (cm^2) 0.7 cm^2 05/19/23 1400   Wound Volume (cm^3) 0 cm^3 05/19/23 1400   Post-Procedure Length (cm) 0.7 cm 05/19/23 1400   Post-Procedure Width (cm) 0.3 cm 05/19/23 1400   Post-Procedure Depth (cm) 0 cm 05/19/23 1400   Post-Procedure Surface Area (cm^2) 0.21 cm^2 05/19/23 1400   Post-Procedure Volume (cm^3) 0 cm^3 05/19/23 1400   Wound Healing % 100 05/19/23 1400   Tunneling (cm) 0 cm 05/19/23 1400   Undermining (cm) 0 cm 05/19/23 1400   Wound Odor None 05/19/23 1400   Exposed Structures None 05/19/23 1400   Number of days: 81       Wound 03/03/23 Full Thickness Wound Leg LLE medial (Active)   Wound Image    05/19/23 1400   Site Assessment Red;Purple;Boggy;Fragile 05/19/23 1400   Periwound Assessment Scar tissue;Fragile;Hyperpigmented 05/19/23 1400   Margins Unattached edges 05/19/23 1400   Drainage Amount Moderate 05/19/23 1400   Drainage Description Serosanguineous 05/19/23 1400   Treatments Cleansed;Provider debridement;Site care 05/19/23 1400   Wound Cleansing Puracyn [place] 05/19/23 1400   Periwound Protectant Skin Protectant Wipes to Periwound;Barrier Paste 05/19/23 1400   Dressing Cleansing/Solutions Not Applicable 05/19/23 1400   Dressing Options Hydrofiber Silver;Other (Comments);Tubigrip 05/19/23 1400   Dressing Changed Changed 05/19/23 1400   Dressing Change/Treatment Frequency Monday, Wednesday, Friday, and As Needed 03/31/23 1527   Non-staged Wound Description Full thickness 05/19/23 1400   Wound Length (cm) 2 cm 05/19/23 1400   Wound Width (cm) 1.5 cm 05/19/23 1400   Wound Depth (cm) 0.1 cm 05/19/23  1400   Wound Surface Area (cm^2) 3 cm^2 05/19/23 1400   Wound Volume (cm^3) 0.3 cm^3 05/19/23 1400   Post-Procedure Length (cm) 0.8 cm 05/19/23 1400   Post-Procedure Width (cm) 1.3 cm 05/19/23 1400   Post-Procedure Depth (cm) 0.1 cm 05/19/23 1400   Post-Procedure Surface Area (cm^2) 1.04 cm^2 05/19/23 1400   Post-Procedure Volume (cm^3) 0.104 cm^3 05/19/23 1400   Wound Healing % 0 05/19/23 1400   Tunneling (cm) 0 cm 05/19/23 1400   Undermining (cm) 0 cm 05/19/23 1400   Wound Odor None 05/19/23 1400   Exposed Structures None 05/19/23 1400   Number of days: 77       Wound 03/31/23 Pressure Injury Foot Plantar;Distal Left (Active)   Wound Image    05/19/23 1400   Site Assessment Red;Yellow 05/19/23 1400   Periwound Assessment Callused 05/19/23 1400   Margins Unattached edges 05/19/23 1400   Drainage Amount Moderate 05/19/23 1400   Drainage Description Serosanguineous 05/19/23 1400   Treatments Cleansed;Provider debridement 05/19/23 1400   Wound Cleansing Puracyn [place] 05/19/23 1400   Periwound Protectant Skin Protectant Wipes to Periwound;Barrier Paste 05/19/23 1400   Dressing Cleansing/Solutions Not Applicable 05/19/23 1400   Dressing Options Hydrofiber Silver Strip;Hydrofiber Silver;Silicone Adhesive Foam;Tubigrip 05/19/23 1400   Dressing Changed Changed 05/19/23 1400   WOUND NURSE ONLY - Pressure Injury Stage 3 05/19/23 1400   Wound Length (cm) 0.6 cm 05/19/23 1400   Wound Width (cm) 0.9 cm 05/19/23 1400   Wound Depth (cm) 0.4 cm 05/19/23 1400   Wound Surface Area (cm^2) 0.54 cm^2 05/19/23 1400   Wound Volume (cm^3) 0.216 cm^3 05/19/23 1400   Post-Procedure Length (cm) 0.8 cm 05/19/23 1400   Post-Procedure Width (cm) 0.9 cm 05/19/23 1400   Post-Procedure Depth (cm) 0.4 cm 05/19/23 1400   Post-Procedure Surface Area (cm^2) 0.72 cm^2 05/19/23 1400   Post-Procedure Volume (cm^3) 0.288 cm^3 05/19/23 1400   Wound Healing % -300 05/19/23 1400   Tunneling (cm) 0 cm 05/19/23 1400   Undermining (cm) 0 cm 05/19/23 1400    Wound Odor None 05/19/23 1400   Exposed Structures None 05/19/23 1400   Number of days: 49       PROCEDURE: Excisional debridement of sacral wound  -2% viscous lidocaine applied topically to wound bed for approximately 5 minutes prior to debridement  -Curette used to debride wound bed.  Excisional debridement was performed to remove devitalized tissue until healthy, bleeding tissue was visualized.   Entire surface of wound, 5.85 cm² debrided.  Tissue debrided into the muscle / fascia layer.    -Bleeding controlled with manual pressure.    -Wound care completed by wound RN, refer to flowsheet  -Patient tolerated the procedure well, without c/o pain or discomfort.      PROCEDURE: Excisional debridement of left medial lower leg wound, left heel and left foot plantar wound  -2% viscous lidocaine applied topically to wound bed for approximately 5 minutes prior to debridement  -Curette used to debride wound bed.  Excisional debridement was performed to remove devitalized tissue until healthy, bleeding tissue was visualized.   Entire surface of wound, 1.97 cm² debrided.  Tissue debrided into the subcutaneous layer.    -Bleeding controlled with manual pressure.    -Wound care completed by wound RN, refer to flowsheet  -Patient tolerated the procedure well, without c/o pain or discomfort.        BIOLOGIC LOG  5/20/2022-first application.  PRODUCT: EpiCord 2 x 3 cm . PRODUCT #[id number]; EXPIRES: 2026-12-01 5/27/2022- second application.  PRODUCT: EpiCordEX 2 x 3 cm . PRODUCT #[id number]; EXPIRES: 2026-09-01    6/3/2022- third application.  PRODUCT: EpiCordEX 2 x 3 cm . PRODUCT #[id number]; EXPIRES: 2026-10-01    6/10/2022- fourth application.  PRODUCT: EpiCordEX 2 x 3 cm . PRODUCT #[id number]; EXPIRES: 2026-09-01 6/17/2022- fifth application.  PRODUCT: EpiCordEX 2 x 3 cm . PRODUCT #[id number]; EXPIRES: 2027-02-01 6/24/2022- sixth application.  PRODUCT: EpiCordEX 2 x 3 cm .  PRODUCT #[id number]; EXPIRES: 2027-02-01 07/01/22: Seventh application.  Product: Epicort Dx 2.0 x 3.0 cm reorder number [id number]; product number [id number]; expires 2027-02-01 7/22/2022- eighth application.  PRODUCT: EpiCord 2 x 3 cm, reorder #[id number]. PRODUCT #[id number]; EXPIRES: 2027-02-01      Pertinent Labs and Diagnostics:    Labs:     A1c: No results found for: HBA1C     [facility] results, 7/1/2022 (under media tab)    CRP 13    ESR 31      IMAGING:     10/3/2022-CT of pelvis with contrast  IMPRESSION:     1.  Posterior soft tissue sacral wound and 1.5 x 3.7 cm abscess.   2.  Progressive destruction of the distal sacrum and proximal coccyx. Sclerosis and irregularity of the distal sacrum consistent with osteomyelitis.   3.  Old wound within the soft tissues posterior to the distal left SI joint with possible fistulous communication.    10/3/2022-CT of tib-fib with and without contrast, with reconstruction  IMPRESSION:     1.  Old fractures of the left tibia and fibula with extensive callus formation and bony bridging as well as extensive dystrophic calcifications. Similar to previous.   2.  Distal medial soft tissue wounds with ill-defined superficial in the soft tissue thickening and fluid collections suggestive of phlegmon. No organized abscess identified.   3.  No definite osteomyelitis.   4.  Arthritic changes.        VASCULAR STUDIES: No results found.    LAST  WOUND CULTURE:   Lab Results   Component Value Date/Time    CULTRSULT Mixed enteric ade >100,000 cfu/mL 04/15/2023 04:37 PM      PATHOLOGY  2/17/2023-bone fragment extracted from left lower extremity wound\\  FINAL DIAGNOSIS:     A. Left leg bone fragment at base of chronic wound:          Extensively degenerated fibrocartilaginous tissue with a rim of           fibrinopurulent debris          Correlate with culture findings            Comment: While no residual intact bone is identified, these           findings are  suggestive of adjacent osteomyelitis.      ASSESSMENT AND PLAN:     1. Sacral decubitus ulcer, stage IV ([facility])  Comments: Ulcer first noted in early April 2022 as small open area which quickly enlarged.  This ulcer is present distal from previous sacral ulcer which healed after surgery in January.  Patient has history of flap reconstruction x2 to this area.  CT shows progressive destruction of distal sacrum and proximal coccyx.    5/19/2023: Wound area has decreased, depth has increased.  May be positional.  Still probes close to bone  -Excisional debridement of wound in clinic today, medically necessary to promote wound healing.  -Home health to change dressing 1-2 times per week in between clinic visits  -Patient admits to spending most of his day up in the wheelchair.  He has been advised to minimize time out of bed  - Patient does not currently have follow up with Dr. Saini. If wound deteriorates or fails to improve can consider re-establishing with Dr. Saini for evaluation for coverage options. Patient does not want to pursue at this time and is happy with current conservative approach even with wound worsening.  -Roho cushion in wheelchair, appears to be in good condition    Wound care: Hydrofiber silver strip, hydrofiber silver, Mepilex    2. Postoperative wound dehiscence, subsequent encounter  Comments: On 4/26/2022 patient underwent irrigation and debridement of multiple compartments of the left lower extremity states for pressure injury, with bone excision, and complex closure of chronic wound using biologic skin substitute.  During postop visit in surgeons office on 5/11, surgical site was noted to be dehisced.  Patient was referred to wound clinic for management.    5/19/2023: Area of this wound has increased since last assessment.  Historically it has waxed and waned.  Tissue quality is poor, fragile.  -Excisional debridement of wound in clinic today, medically necessary to promote wound  healing.  -Patient to return to clinic weekly for assessment and debridement  -Home health to change dressing 1-2 times per week in between clinic visits OM.  -Patient does not wish to consider surgical options at this time.  -Patient to be mindful of offloading when supine, should assure that his leg is not rotating laterally  Wound care: Hydrofiber silver, silicone adhesive foam, tubigrip    3. Deep tissue injury  4. Pressure injury of left foot, stage III  Comments: DTI to posterior left lower leg first observed in clinic 7/29/2022.  Patient does not know how it started, had been wearing heel float boot consistently.    5/19/2023: Wound area has decreased since last assessment.  Wound is progressing, though slowly.  -Excisional debridement of wound in clinic today, medically necessary to promote wound healing.  -Patient to return to clinic weekly for assessment and debridement  -Home health to change dressing 1-2 times per week in between clinic visits     Wound care: Hydrofiber silver, Mepilex, Tubigrip D    5. Pressure injury of left heel, stage 3 ([facility])  Comments: First noted in clinic on 10/21/2022, originally noted to be stage II    5/19/2023: Wound area has decreased significantly since last assessment, nearly resolved.  - Excisional debridement was performed in clinic, medically necessary to promote wound healing.  - Patient continue wearing heel float boots at all times  - Heel does not appear to contact any solid surfaces while in wheelchair       Wound Care: Hydrofiber Silver, Heel Mepilex to reduce pressure and friction    6. Quadriplegia, C5-C7, incomplete ([facility])  Comments: Complicating factor.  Impaired mobility and sensation  -Patient is still spending 7-8 hours/day up in his wheelchair, though he does have appropriate offloading cushion, and knows to reposition frequently.  Wears heel float boots bilaterally at all times        Please note that this note may have been created using voice recognition  software. I have worked with technical experts from [facility] to optimize the interface.  I have made every reasonable attempt to correct obvious errors, but there may be errors of grammar and possibly content that I did not discover before finalizing the note.

## 2023-05-22 NOTE — PROCEDURES
VAC dressing removed by RN Wound bed inspected and no residual foam noted. Wound VAC changed with 2 black foams. Restarted at 125mmHg continuous. No leak noted.    No

## 2023-05-26 ENCOUNTER — OFFICE VISIT (OUTPATIENT)
Dept: WOUND CARE | Facility: MEDICAL CENTER | Age: 73
End: 2023-05-26
Attending: INTERNAL MEDICINE
Payer: MEDICARE

## 2023-05-26 VITALS
RESPIRATION RATE: 17 BRPM | HEART RATE: 63 BPM | SYSTOLIC BLOOD PRESSURE: 94 MMHG | OXYGEN SATURATION: 94 % | DIASTOLIC BLOOD PRESSURE: 64 MMHG | TEMPERATURE: 97.4 F

## 2023-05-26 DIAGNOSIS — G82.54 QUADRIPLEGIA, C5-C7 INCOMPLETE (HCC): ICD-10-CM

## 2023-05-26 DIAGNOSIS — L89.623 PRESSURE INJURY OF LEFT HEEL, STAGE 3 (HCC): ICD-10-CM

## 2023-05-26 DIAGNOSIS — T81.31XD POSTOPERATIVE WOUND DEHISCENCE, SUBSEQUENT ENCOUNTER: ICD-10-CM

## 2023-05-26 DIAGNOSIS — T14.8XXA DEEP TISSUE INJURY: ICD-10-CM

## 2023-05-26 DIAGNOSIS — L89.893 PRESSURE INJURY OF LEFT FOOT, STAGE 3 (HCC): ICD-10-CM

## 2023-05-26 DIAGNOSIS — L89.154 SACRAL DECUBITUS ULCER, STAGE IV (HCC): ICD-10-CM

## 2023-05-26 PROCEDURE — 11042 DBRDMT SUBQ TIS 1ST 20SQCM/<: CPT

## 2023-05-26 PROCEDURE — 3078F DIAST BP <80 MM HG: CPT | Performed by: NURSE PRACTITIONER

## 2023-05-26 PROCEDURE — 11043 DBRDMT MUSC&/FSCA 1ST 20/<: CPT | Performed by: NURSE PRACTITIONER

## 2023-05-26 PROCEDURE — 3074F SYST BP LT 130 MM HG: CPT | Performed by: NURSE PRACTITIONER

## 2023-05-26 PROCEDURE — 11042 DBRDMT SUBQ TIS 1ST 20SQCM/<: CPT | Mod: 59 | Performed by: NURSE PRACTITIONER

## 2023-05-26 PROCEDURE — 11043 DBRDMT MUSC&/FSCA 1ST 20/<: CPT

## 2023-05-26 NOTE — PATIENT INSTRUCTIONS
-Keep your wound dressing clean, dry, and intact.    -Change your dressing if it becomes soiled, soaked, or falls off.    -Should you experience any significant changes in your wound(s), such as infection (redness, swelling, localized heat, increased pain, fever > 101 F, chills) or have any questions regarding your home care instructions, please contact the wound center at [phone]. If after hours, contact your primary care physician or go to the hospital emergency room.      125

## 2023-05-26 NOTE — PROGRESS NOTES
Provider Encounter- Pressure Injury        HISTORY OF PRESENT ILLNESS  Wound History:    START OF CARE IN CLINIC: 3/3/2023 (return to clinic after hospitalization)    REFERRING PROVIDER: Sahara Mendez      WOUNDS- Pressure Injury, Sacrococcygeal, stage IV                                                             Surgical wound dehiscence, left medial lower leg-status post surgical I&D of stage IV pressure injury and           biologic application                    Pressure injury, DTI, left posterior lower leg-first observed in clinic 7/29/2022       Pressure injury stage III L heel - first noted 10/21     Right 2nd toe avulsion - first noted 10/21     Skin tag left posterior ear - first noted 10/21     HISTORY: Patient with history of incomplete quadriplegia referred to [facility] for treatment of a stage IV pressure injury.  He has a history of previous pressure injuries to this area, and underwent muscle flaps in 2019, and then again in 2020.  He was seen in the wound clinic in November 2021 for an ulcer proximal from his current ulcer, and pressure injuries to his left posterior lower leg and left heel.  At that time, it was discovered that the patient had retained VAC foam embedded in the wound bed of the sacral wound.  Attempts were made to get him back to his plastic surgeon, though unsuccessful.  In January he underwent surgical removal of VAC sponge along with excisional debridement of his sacral wound by Dr. Chaves.  After the surgery, his wound went on to heal without incident.   In early April 2022, his home health nurse noted a new sacral ulcer, below the previous ulcer which quickly tripled in size over the following weeks.  The ulcer to his left medial lower leg had also deteriorated, with bone visible at the base..  He was hospitalized from 4/22 until 4/27/2022 and underwent surgery with Dr. Raman on 4/26 for irrigation and debridement of multiple compartments of the left lower extremity, bone  excision, and complex closure of chronic wound using biologic skin substitute.   His sacrococcygeal wound was not surgically addressed during this admission.  He was discharged back to his group home, with home health, and referral to outpatient wound clinic for his sacral wound.  He was instructed to follow-up with his surgeon for his lower leg wound.       Postoperatively, the left medial lower leg incision dehisced.  He was seen by his surgeon at [facility] on 5/11.  The surgeon opted to leave remaining sutures in place, and refer him to the wound clinic for treatment of this wound.   Treatment of this wound was initiated in clinic on 5/12.  During this visit was also noted that his heel DTI had resolved, but that he had a new pressure injury to his left posterior lower extremity.     A new pressure injury was noted to patient's right upper buttock/lower back on 5/20/2022.  Wound was linear in shape, skin discolored but intact.     Abrasion noted to left anterior lower leg.  First observed in clinic on 7/22/2022.  Patient states he bumped his leg into his food tray.     Small DTI noted to patient's left lateral lower leg on 7/29/2022.  Skin intact but discolored.     Large area of deep tissue injury noted to patient's left exterior lower leg.  Patient denied any trauma to this area.  Skin intact.  Wound documented.    1/27/2023: Patient was admitted to [facility] from 1/23-1/25/2023 with gross hematuria. He underwent RICHARD which showed watchman device was in place and he was taken off of Xarelto. While hospitalized wound team was consulted. He was referred back to [facility] and home health upon discharge.    Patient was hospitalized at [facility] for pyelonephritis from 2/26 until 3/2/2023, admitted for fever and general malaise.  He was admitted and initially started on linezolid and meropenem for suspected UTI and history of multidrug-resistant organisms.  Urine cultures were negative. ID was consulted, recommended CT of chest and  "abdomen,which were negative for acute findings. However, he was treated with 5 days total course of antibiotics for suspected UTI, and symptoms completely resolved.  During this admission, the inpatient wound team was consulted for treatment of his sacral and lower leg wounds.  A wound culture was taken from his left heel pressure ulcer, negative.  Once stabilized, he was discharged home and referred back to [facility] to resume treatment of his wounds.    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  Never used smokeless tobacco    Patient's problem list, allergies, and current medications reviewed and updated in Epic    Interval History:  Interval History thinned 7/29/2022.  Please see previous notes for complete interval history.     Interval History thinned 1/27/2023. Please see previous note for complete interval history.    Interval History thinned 3/3/2023.  Please see previous notes for complete interval history.      3/3/2023 : Clinic visit with ADILSON Arthur, FNPEGGY-BC, CWDINESHN, CFTRACI.   Patient returns to clinic after hospitalization for UTI.  He states that overall he is feeling well, denies fevers, chills, nausea, vomiting, cough or shortness of breath.    Per last wound clinic note, a loose bone fragment was extracted from the wound bed of his left medial leg wound and sent for pathology.  Histology report described, \"extensively degenerated fibrocartilaginous tissue\", suggestive of adjacent osteomyelitis.  X-ray of tib-fib ordered to assess for OM.    3/10/2023: Clinic visit with Steve Hodges MD. Patient reports feeling in normal state of health. He obtained Xray left tib-fib, " we do not have images, but report states no evidence of acute pathology such as OM. We discussed options for more aggressive treatment, patient would prefer to watch and wait. Left heel remains open since hospitalization despite using Prevalon boots, will prescribe Prafo boot for offloading. Sacrum measures slightly smaller, does not appear significantly changed.    3/24/2023: Clinic visit with Steve Hodges MD. Patient reports feeling well. He obtained PRAFO boot and left heel wound smaller. Rest of wounds are not significantly changed. New linear friction wound buttocks. Patient reports occurred when transitioning from laying to sitting with his low airloss mattress.    3/31/2023 : Clinic visit with Kelly Sandy, ADILSON, FNP-BC, CWDINESHN, CFCN.   Anjum presents today complaining of cold symptoms.  States he has had an upper respiratory illness for a couple of weeks now but feels he is getting better.  He does have a new tender area to his plantar foot with mild discoloration, possibly caused by seam on insole to PRAFO boot.  He has stopped wearing the PRAFO and is now wearing Prevalon boot, and feels this is improving.  The left medial lower leg wound does show some improvement, with decrease in area.  Sacral wound is about the same.  The left heel ulcer is again almost healed, with thin layer of epithelium noted to wound bed, scant drainage.    4/7/2023: Clinic visit with Steve Hodges MD. Patient reports doing well. Excited for seeing family for Lamar and SO is coming to visit. Patient denies any symptoms of infection. His left medial leg wound is covered with thin, fragile epithelium and boggy due to poor quality of underlying tissue rather than abscess or fluid collection. Left plantar foot has opened slightly but appears improving, left heel is smaller. He continues to use Prevalon boots. Patient reports new abrasion to lateral right knee from rubbing on the dashboard, does not appear open. Home health has  been applying foam dressing for padding.    4/21/2023: Clinic visit with Steve Hodges MD. Patient reports feeling well, denies any symptoms of infection. He has been using Prevalon boots 24 hours a day, despite this, he has worsening left heel pressure injury. We discussed other methods and he will try to use pillow under leg to offload heel. We also applied an allyven dressing to plantar foot to reduce friction and pressure and he can try to use PRAFO boot. His heel does not appear to be in contact with any solid surface on his wheelchair. Other wounds are unchanged. Due to worsening left heel wound, will bring back to clinic next week for evaluation.    4/28/2023: Clinic visit with Steve Hodges MD. Patient reports feeling well, denies any fevers or chills. He has been using pillow under leg to completely offload heel at night and both heel / plantar foot wound improved. He reports with the nice weather he has been spending majority of his day up in chair outside. Unfortunately his sacral wound larger and bone palpable. He is unsure if he would want to be evaluated again by surgery for possible flap.    5/5/2023: Clinic visit with Steve Hodges MD. Patient denies any complaints today. He reports that he has spent less time in wheelchair in effort to improve offloading. Patients lower extremity wounds are fairly stable. Sacral wound appears stable with small partial thickness linear wound superior. He does not recall any specific injury.    5/12/2023 : Clinic visit with ADILSON Arthur, HOLDEN, IMAN, LILIYA.   Anjum continues to feel well overall.  His wounds continue to wax and wane.  He states that his low-air-loss bed was malfunctioning, was bottoming out, has since been repaired.  He continues to spend much of his day up in his wheelchair.    5/19/2023 : Clinic visit with ADILSON Arthur, HOLDEN, IMAN, LILIYA.   Anjum states he is feeling very well today.  Sacral wound about the same, lateral leg wound  continues to wax and wane.  Heel wound is improving steadily.  Plantar foot wound slowly progressing.    5/26/2023 : Clinic visit with Kelly Snady, APRN, FNP-BC, CWOCN, CFCN.   Continues to feel well.  His heel ulcer is healed, though covered with fragile epithelium.  Medial leg wound has deteriorated slightly.  Sacral and plantar foot wounds are both improved.      REVIEW OF SYSTEMS:   Unchanged from previous wound clinic assessment on 5/19/2023,, except as noted in interval history above     PHYSICAL EXAMINATION:   BP 94/64   Pulse 63   Temp 36.3 °C (97.4 °F) (Temporal)   Resp 17   SpO2 94%   Physical Exam  Constitutional:       Appearance: He is obese.   Cardiovascular:      Rate and Rhythm: Normal rate.   Pulmonary:      Effort: Pulmonary effort is normal.   Abdominal:      Comments: Colostomy left lower quadrant   Genitourinary:     Comments: Suprapubic catheter  Skin:     Comments: Stage IV coccygeal pressure injury -wound area and depth have decreased slightly since last assessment, tissue quality within wound bed is improved, moderate serosanguineous drainage, thin layer of slough to wound bed, no evidence of infection.    Full-thickness wound to left medial lower leg -total wound area has increased since last assessment, tissue quality is poor, slough to wound bed, moderate serosanguineous drainage, no signs or symptoms of infection    Stage III pressure injury left posterior heel -wound appears to be resolved, covered with fragile epithelium, no drainage.  At risk for reopening  Stage III pressure injury plantar foot -wound area has again decreased, slough to wound bed, moderate serosanguineous drainage, no evidence of infection.      Refer to wound flowsheet and photos   Neurological:      Mental Status: He is alert and oriented to person, place, and time.   Psychiatric:         Mood and Affect: Mood normal.         WOUND ASSESSMENT  Wound 02/27/23 Pressure Injury Coccyx Stage IV (Active)   Wound  Image    05/26/23 1400   Site Assessment Red;Yellow 05/26/23 1400   Periwound Assessment Fragile;Scar tissue 05/26/23 1400   Margins Unattached edges 05/26/23 1400   Drainage Amount Large 05/26/23 1400   Drainage Description Serosanguineous 05/26/23 1400   Treatments Cleansed;Provider debridement;Site care 05/26/23 1400   Wound Cleansing Puracyn Overbrook 05/26/23 1400   Periwound Protectant Skin Protectant Wipes to Periwound;Barrier Paste 05/26/23 1400   Dressing Cleansing/Solutions Not Applicable 05/26/23 1400   Dressing Options Collagen Dressing;Hydrofiber Silver Strip;Hydrofiber Silver;Other (Comments) 05/26/23 1400   Dressing Changed Changed 05/26/23 1400   Dressing Change/Treatment Frequency Monday, Wednesday, Friday, and As Needed 03/31/23 1527   WOUND NURSE ONLY - Pressure Injury Stage 4 05/19/23 1400   Wound Length (cm) 3.6 cm 05/26/23 1400   Wound Width (cm) 1.5 cm 05/26/23 1400   Wound Depth (cm) 1.4 cm 05/26/23 1400   Wound Surface Area (cm^2) 5.4 cm^2 05/26/23 1400   Wound Volume (cm^3) 7.56 cm^3 05/26/23 1400   Post-Procedure Length (cm) 3.6 cm 05/26/23 1400   Post-Procedure Width (cm) 1.6 cm 05/26/23 1400   Post-Procedure Depth (cm) 1.4 cm 05/26/23 1400   Post-Procedure Surface Area (cm^2) 5.76 cm^2 05/26/23 1400   Post-Procedure Volume (cm^3) 8.064 cm^3 05/26/23 1400   Wound Healing % -108 05/26/23 1400   Tunneling (cm) 2.2 cm 05/19/23 1400   Tunneling Clock Position of Wound 12 05/19/23 1400   Undermining (cm) 0 cm 05/26/23 1400   Wound Odor None 05/26/23 1400   Exposed Structures None 05/26/23 1400   Number of days: 88       Wound 02/27/23 Heel Posterior Left (Active)   Wound Image   05/26/23 1400   Site Assessment Pink;Epithelialization;Fragile 05/26/23 1400   Periwound Assessment Blanchable erythema;Fragile;Scar tissue 05/26/23 1400   Margins Attached edges 05/26/23 1400   Drainage Amount None 05/26/23 1400   Drainage Description Serosanguineous 05/19/23 1400   Treatments Cleansed;Site care 05/26/23  1400   Wound Cleansing Puracyn Ivins 05/26/23 1400   Periwound Protectant Skin Protectant Wipes to Periwound;Barrier Paste 05/26/23 1400   Dressing Cleansing/Solutions Not Applicable 05/26/23 1400   Dressing Options Other (Comments) 05/26/23 1400   Dressing Changed Changed 05/26/23 1400   Dressing Change/Treatment Frequency Monday, Wednesday, Friday, and As Needed 03/31/23 1527   WOUND NURSE ONLY - Pressure Injury Stage 3 05/19/23 1400   Wound Length (cm) 1 cm 05/19/23 1400   Wound Width (cm) 0.7 cm 05/19/23 1400   Wound Depth (cm) 0 cm 05/19/23 1400   Wound Surface Area (cm^2) 0.7 cm^2 05/19/23 1400   Wound Volume (cm^3) 0 cm^3 05/19/23 1400   Post-Procedure Length (cm) 0.7 cm 05/19/23 1400   Post-Procedure Width (cm) 0.3 cm 05/19/23 1400   Post-Procedure Depth (cm) 0 cm 05/19/23 1400   Post-Procedure Surface Area (cm^2) 0.21 cm^2 05/19/23 1400   Post-Procedure Volume (cm^3) 0 cm^3 05/19/23 1400   Wound Healing % 100 05/19/23 1400   Tunneling (cm) 0 cm 05/19/23 1400   Undermining (cm) 0 cm 05/19/23 1400   Wound Odor None 05/26/23 1400   Exposed Structures None 05/26/23 1400   Number of days: 88       Wound 03/03/23 Full Thickness Wound Leg LLE medial (Active)   Wound Image    05/26/23 1400   Site Assessment Red;Purple;Boggy;Fragile 05/26/23 1400   Periwound Assessment Scar tissue;Fragile;Hyperpigmented 05/26/23 1400   Margins Unattached edges 05/26/23 1400   Drainage Amount Moderate 05/26/23 1400   Drainage Description Serosanguineous;Sanguineous 05/26/23 1400   Treatments Cleansed;Provider debridement;Site care 05/26/23 1400   Wound Cleansing Puracyn Ivins 05/26/23 1400   Periwound Protectant Skin Protectant Wipes to Periwound;Barrier Paste 05/26/23 1400   Dressing Cleansing/Solutions Not Applicable 05/26/23 1400   Dressing Options Hydrofiber Silver Strip;Hydrofiber Silver;Silicone Adhesive Foam;Tubigrip 05/26/23 1400   Dressing Changed Changed 05/26/23 1400   Dressing Change/Treatment Frequency Monday, Wednesday,  Friday, and As Needed 03/31/23 1527   Non-staged Wound Description Full thickness 05/26/23 1400   Wound Length (cm) 2 cm 05/26/23 1400   Wound Width (cm) 1.5 cm 05/26/23 1400   Wound Depth (cm) 0.4 cm 05/26/23 1400   Wound Surface Area (cm^2) 3 cm^2 05/26/23 1400   Wound Volume (cm^3) 1.2 cm^3 05/26/23 1400   Post-Procedure Length (cm) 2 cm 05/26/23 1400   Post-Procedure Width (cm) 1.6 cm 05/26/23 1400   Post-Procedure Depth (cm) 0.4 cm 05/26/23 1400   Post-Procedure Surface Area (cm^2) 3.2 cm^2 05/26/23 1400   Post-Procedure Volume (cm^3) 1.28 cm^3 05/26/23 1400   Wound Healing % -300 05/26/23 1400   Tunneling (cm) 0 cm 05/26/23 1400   Undermining (cm) 0 cm 05/26/23 1400   Wound Odor None 05/26/23 1400   Exposed Structures None 05/26/23 1400   Number of days: 84       Wound 03/31/23 Pressure Injury Foot Plantar;Distal Left (Active)   Wound Image    05/26/23 1400   Site Assessment Red;Yellow 05/26/23 1400   Periwound Assessment Dry;Intact;Edema 05/26/23 1400   Margins Unattached edges 05/26/23 1400   Drainage Amount Moderate 05/26/23 1400   Drainage Description Serosanguineous 05/26/23 1400   Treatments Cleansed;Provider debridement;Site care 05/26/23 1400   Wound Cleansing Puracyn Stockton 05/26/23 1400   Periwound Protectant Skin Protectant Wipes to Periwound;Barrier Paste 05/26/23 1400   Dressing Cleansing/Solutions Not Applicable 05/26/23 1400   Dressing Options Hydrofiber Silver Strip;Hydrofiber Silver;Silicone Adhesive Foam;Tubigrip 05/26/23 1400   Dressing Changed Changed 05/26/23 1400   WOUND NURSE ONLY - Pressure Injury Stage 3 05/19/23 1400   Wound Length (cm) 0.8 cm 05/26/23 1400   Wound Width (cm) 0.7 cm 05/26/23 1400   Wound Depth (cm) 0.4 cm 05/26/23 1400   Wound Surface Area (cm^2) 0.56 cm^2 05/26/23 1400   Wound Volume (cm^3) 0.224 cm^3 05/26/23 1400   Post-Procedure Length (cm) 0.8 cm 05/26/23 1400   Post-Procedure Width (cm) 0.8 cm 05/26/23 1400   Post-Procedure Depth (cm) 0.4 cm 05/26/23 1400    Post-Procedure Surface Area (cm^2) 0.64 cm^2 05/26/23 1400   Post-Procedure Volume (cm^3) 0.256 cm^3 05/26/23 1400   Wound Healing % -315 05/26/23 1400   Tunneling (cm) 0 cm 05/26/23 1400   Undermining (cm) 0 cm 05/26/23 1400   Wound Odor None 05/26/23 1400   Exposed Structures None 05/26/23 1400   Number of days: 56       PROCEDURE: Excisional debridement of sacral wound  -2% viscous lidocaine applied topically to wound bed for approximately 5 minutes prior to debridement  -Curette used to debride wound bed.  Excisional debridement was performed to remove devitalized tissue until healthy, bleeding tissue was visualized.   Entire surface of wound, 5.76 cm² debrided.  Tissue debrided into the muscle / fascia layer.    -Bleeding controlled with manual pressure.    -Wound care completed by wound RN, refer to flowsheet  -Patient tolerated the procedure well, without c/o pain or discomfort.      PROCEDURE: Excisional debridement of left medial lower leg wound, and left foot plantar wound  -2% viscous lidocaine applied topically to wound bed for approximately 5 minutes prior to debridement  -Curette used to debride wound bed.  Excisional debridement was performed to remove devitalized tissue until healthy, bleeding tissue was visualized.   Entire surface of wound, 3.84cm² debrided.  Tissue debrided into the subcutaneous layer.    -Bleeding controlled with manual pressure.    -Wound care completed by wound RN, refer to flowsheet  -Patient tolerated the procedure well, without c/o pain or discomfort.        BIOLOGIC LOG  5/20/2022-first application.  PRODUCT: EpiCord 2 x 3 cm . PRODUCT #[id number]; EXPIRES: 2026-12-01 5/27/2022- second application.  PRODUCT: EpiCordEX 2 x 3 cm . PRODUCT #EX [id number]; EXPIRES: 2026-09-01    6/3/2022- third application.  PRODUCT: EpiCordEX 2 x 3 cm . PRODUCT #EX [id number]; EXPIRES: 2026-10-01    6/10/2022- fourth application.  PRODUCT: EpiCordEX 2 x 3 cm . PRODUCT #EX  [id number]; EXPIRES: 2026-09-01 6/17/2022- fifth application.  PRODUCT: EpiCordEX 2 x 3 cm . PRODUCT #EX [id number]; EXPIRES: 2027-02-01 6/24/2022- sixth application.  PRODUCT: EpiCordEX 2 x 3 cm . PRODUCT #EX [id number]; EXPIRES: 2027-02-01 07/01/22: Seventh application.  Product: Epicort Dx 2.0 x 3.0 cm reorder number [id number]; product number [id number]; expires 2027-02-01 7/22/2022- eighth application.  PRODUCT: EpiCord 2 x 3 cm, reorder #[id number]. PRODUCT #[id number]; EXPIRES: 2027-02-01      Pertinent Labs and Diagnostics:    Labs:     A1c: No results found for: HBA1C     [facility] results, 7/1/2022 (under media tab)    CRP 13    ESR 31      IMAGING:     10/3/2022-CT of pelvis with contrast  IMPRESSION:     1.  Posterior soft tissue sacral wound and 1.5 x 3.7 cm abscess.   2.  Progressive destruction of the distal sacrum and proximal coccyx. Sclerosis and irregularity of the distal sacrum consistent with osteomyelitis.   3.  Old wound within the soft tissues posterior to the distal left SI joint with possible fistulous communication.    10/3/2022-CT of tib-fib with and without contrast, with reconstruction  IMPRESSION:     1.  Old fractures of the left tibia and fibula with extensive callus formation and bony bridging as well as extensive dystrophic calcifications. Similar to previous.   2.  Distal medial soft tissue wounds with ill-defined superficial in the soft tissue thickening and fluid collections suggestive of phlegmon. No organized abscess identified.   3.  No definite osteomyelitis.   4.  Arthritic changes.        VASCULAR STUDIES: No results found.    LAST  WOUND CULTURE:   Lab Results   Component Value Date/Time    CULTRSULT Mixed enteric ade >100,000 cfu/mL 04/15/2023 04:37 PM      PATHOLOGY  2/17/2023-bone fragment extracted from left lower extremity wound\\  FINAL DIAGNOSIS:     A. Left leg bone fragment at base of chronic wound:          Extensively  degenerated fibrocartilaginous tissue with a rim of           fibrinopurulent debris          Correlate with culture findings            Comment: While no residual intact bone is identified, these           findings are suggestive of adjacent osteomyelitis.      ASSESSMENT AND PLAN:     1. Sacral decubitus ulcer, stage IV ([facility])  Comments: Ulcer first noted in early April 2022 as small open area which quickly enlarged.  This ulcer is present distal from previous sacral ulcer which healed after surgery in January.  Patient has history of flap reconstruction x2 to this area.  CT shows progressive destruction of distal sacrum and proximal coccyx.    5/26/2023: Wound is slowly progressing, area has decreased.  Granulation tissue noted.  Thin layer of slough to wound bed  -Excisional debridement of wound in clinic today, medically necessary to promote wound healing.  -Home health to change dressing 1-2 times per week in between clinic visits  -Patient admits to spending most of his day up in the wheelchair.  He has been advised to minimize time out of bed  - Patient does not currently have follow up with Dr. Saini. If wound deteriorates or fails to improve can consider re-establishing with Dr. Saini for evaluation for coverage options. Patient does not want to pursue at this time and is happy with current conservative approach even with wound worsening.  -Roho cushion in wheelchair, appears to be in good condition.  Patient is well versed on offloading techniques    Wound care: Hydrofiber silver strip, hydrofiber silver, Mepilex    2. Postoperative wound dehiscence, subsequent encounter  Comments: On 4/26/2022 patient underwent irrigation and debridement of multiple compartments of the left lower extremity states for pressure injury, with bone excision, and complex closure of chronic wound using biologic skin substitute.  During postop visit in surgeons office on 5/11, surgical site was noted to be dehisced.  Patient  was referred to wound clinic for management.    5/26/2023: Wound has deteriorated since last assessment, area has increased slightly, tissue quality is poor-feels this is not new.  -Excisional debridement of wound in clinic today, medically necessary to promote wound healing.  -Patient to return to clinic weekly for assessment and debridement  -Home health to change dressing 1-2 times per week in between clinic visits OM.  -Patient does not wish to consider surgical options at this time.  -Patient to be mindful of offloading when supine, should assure that his leg is not rotating medially  Wound care: Hydrofiber silver, silicone adhesive foam, tubigrip    3. Deep tissue injury  4. Pressure injury of left foot, stage III  Comments: DTI to posterior left lower leg first observed in clinic 7/29/2022.  Patient does not know how it started, had been wearing heel float boot consistently.  Evolved into stage III pressure injury    5/26/2023: Wound area has decreased slightly since last assessment.  Progressing slowly..  -Excisional debridement of wound in clinic today, medically necessary to promote wound healing.  -Patient to return to clinic weekly for assessment and debridement  -Home health to change dressing 1-2 times per week in between clinic visits     Wound care: Hydrofiber silver, Mepilex, Tubigrip D    5. Pressure injury of left heel, stage 3 ([facility])  Comments: First noted in clinic on 10/21/2022, originally noted to be stage II    5/19/2023: Wound area has decreased significantly since last assessment, nearly resolved.  - Excisional debridement was performed in clinic, medically necessary to promote wound healing.  - Patient continue wearing heel float boots at all times  - Heel does not appear to contact any solid surfaces while in wheelchair       Wound Care: Hydrofiber Silver, Heel Mepilex to reduce pressure and friction    6. Quadriplegia, C5-C7, incomplete ([facility])  Comments: Complicating factor.  Impaired  mobility and sensation  -Patient is still spending 7-8 hours/day up in his wheelchair, though he does have appropriate offloading cushion, and knows to reposition frequently.  Wears heel float boots bilaterally at all times        Please note that this note may have been created using voice recognition software. I have worked with technical experts from [employer] [facility] [employer] to optimize the interface.  I have made every reasonable attempt to correct obvious errors, but there may be errors of grammar and possibly content that I did not discover before finalizing the note.

## 2023-05-26 NOTE — PROGRESS NOTES
Home health wound care orders Re-routed to [facility]. Continue current plan of care.

## 2023-06-02 ENCOUNTER — OFFICE VISIT (OUTPATIENT)
Dept: WOUND CARE | Facility: MEDICAL CENTER | Age: 73
End: 2023-06-02
Attending: INTERNAL MEDICINE
Payer: MEDICARE

## 2023-06-02 VITALS
DIASTOLIC BLOOD PRESSURE: 65 MMHG | SYSTOLIC BLOOD PRESSURE: 108 MMHG | OXYGEN SATURATION: 95 % | TEMPERATURE: 98.5 F | RESPIRATION RATE: 18 BRPM | HEART RATE: 55 BPM

## 2023-06-02 DIAGNOSIS — L89.154 SACRAL DECUBITUS ULCER, STAGE IV (HCC): ICD-10-CM

## 2023-06-02 DIAGNOSIS — L89.893 PRESSURE INJURY OF LEFT FOOT, STAGE 3 (HCC): ICD-10-CM

## 2023-06-02 DIAGNOSIS — G82.54 QUADRIPLEGIA, C5-C7 INCOMPLETE (HCC): ICD-10-CM

## 2023-06-02 DIAGNOSIS — T14.8XXA DEEP TISSUE INJURY: ICD-10-CM

## 2023-06-02 DIAGNOSIS — L89.623 PRESSURE INJURY OF LEFT HEEL, STAGE 3 (HCC): ICD-10-CM

## 2023-06-02 DIAGNOSIS — T81.31XD POSTOPERATIVE WOUND DEHISCENCE, SUBSEQUENT ENCOUNTER: ICD-10-CM

## 2023-06-02 PROCEDURE — 11042 DBRDMT SUBQ TIS 1ST 20SQCM/<: CPT

## 2023-06-02 PROCEDURE — 11043 DBRDMT MUSC&/FSCA 1ST 20/<: CPT | Performed by: NURSE PRACTITIONER

## 2023-06-02 PROCEDURE — 11042 DBRDMT SUBQ TIS 1ST 20SQCM/<: CPT | Mod: 59 | Performed by: NURSE PRACTITIONER

## 2023-06-02 PROCEDURE — 3074F SYST BP LT 130 MM HG: CPT | Performed by: NURSE PRACTITIONER

## 2023-06-02 PROCEDURE — 11043 DBRDMT MUSC&/FSCA 1ST 20/<: CPT

## 2023-06-02 PROCEDURE — 3078F DIAST BP <80 MM HG: CPT | Performed by: NURSE PRACTITIONER

## 2023-06-02 NOTE — PATIENT INSTRUCTIONS
-Keep your wound dressing clean, dry, and intact.    -Change your dressing if it becomes soiled, soaked, or falls off.    -Should you experience any significant changes in your wound(s), such as infection (redness, swelling, localized heat, increased pain, fever > 101 F, chills) or have any questions regarding your home care instructions, please contact the wound center at [phone]. If after hours, contact your primary care physician or go to the hospital emergency room.

## 2023-06-03 NOTE — PROGRESS NOTES
Provider Encounter- Pressure Injury        HISTORY OF PRESENT ILLNESS  Wound History:    START OF CARE IN CLINIC: 3/3/2023 (return to clinic after hospitalization)    REFERRING PROVIDER: Sahara Mendez      WOUNDS- Pressure Injury, Sacrococcygeal, stage IV                                                             Surgical wound dehiscence, left medial lower leg-status post surgical I&D of stage IV pressure injury and           biologic application                    Pressure injury, DTI, left posterior lower leg-first observed in clinic 7/29/2022       Pressure injury stage III L heel - first noted 10/21     Right 2nd toe avulsion - first noted 10/21     Skin tag left posterior ear - first noted 10/21     HISTORY: Patient with history of incomplete quadriplegia referred to [facility] for treatment of a stage IV pressure injury.  He has a history of previous pressure injuries to this area, and underwent muscle flaps in 2019, and then again in 2020.  He was seen in the wound clinic in November 2021 for an ulcer proximal from his current ulcer, and pressure injuries to his left posterior lower leg and left heel.  At that time, it was discovered that the patient had retained VAC foam embedded in the wound bed of the sacral wound.  Attempts were made to get him back to his plastic surgeon, though unsuccessful.  In January he underwent surgical removal of VAC sponge along with excisional debridement of his sacral wound by Dr. Chaves.  After the surgery, his wound went on to heal without incident.   In early April 2022, his home health nurse noted a new sacral ulcer, below the previous ulcer which quickly tripled in size over the following weeks.  The ulcer to his left medial lower leg had also deteriorated, with bone visible at the base..  He was hospitalized from 4/22 until 4/27/2022 and underwent surgery with Dr. Raman on 4/26 for irrigation and debridement of multiple compartments of the left lower extremity, bone  excision, and complex closure of chronic wound using biologic skin substitute.   His sacrococcygeal wound was not surgically addressed during this admission.  He was discharged back to his group home, with home health, and referral to outpatient wound clinic for his sacral wound.  He was instructed to follow-up with his surgeon for his lower leg wound.       Postoperatively, the left medial lower leg incision dehisced.  He was seen by his surgeon at [facility] on 5/11.  The surgeon opted to leave remaining sutures in place, and refer him to the wound clinic for treatment of this wound.   Treatment of this wound was initiated in clinic on 5/12.  During this visit was also noted that his heel DTI had resolved, but that he had a new pressure injury to his left posterior lower extremity.     A new pressure injury was noted to patient's right upper buttock/lower back on 5/20/2022.  Wound was linear in shape, skin discolored but intact.     Abrasion noted to left anterior lower leg.  First observed in clinic on 7/22/2022.  Patient states he bumped his leg into his food tray.     Small DTI noted to patient's left lateral lower leg on 7/29/2022.  Skin intact but discolored.     Large area of deep tissue injury noted to patient's left exterior lower leg.  Patient denied any trauma to this area.  Skin intact.  Wound documented.    1/27/2023: Patient was admitted to [facility] [place] 1/23-1/25/2023 with gross hematuria. He underwent RICHARD which showed watchman device was in place and he was taken off of Xarelto. While hospitalized wound team was consulted. He was referred back to [facility] and home health upon discharge.    Patient was hospitalized at [facility] for pyelonephritis from 2/26 until 3/2/2023, admitted for fever and general malaise.  He was admitted and initially started on linezolid and meropenem for suspected UTI and history of multidrug-resistant organisms.  Urine cultures were negative. ID was consulted, recommended CT of chest and  "abdomen,which were negative for acute findings. However, he was treated with 5 days total course of antibiotics for suspected UTI, and symptoms completely resolved.  During this admission, the inpatient wound team was consulted for treatment of his sacral and lower leg wounds.  A wound culture was taken from his left heel pressure ulcer, negative.  Once stabilized, he was discharged home and referred back to [facility] to resume treatment of his wounds.    Pertinent Medical History: Incomplete quadriplegia, history of stage IV pressure injuries, history of flap procedures to sacral pressure injuries, osteomyelitis, obesity, colostomy in place   Contributing factors: Immobility and Obesity, impaired sensation    Personal support: Attendant-staff at [facility] and home health nursing    TOBACCO USE:   Former smoker, quit in 1977.  Never used smokeless tobacco    Patient's problem list, allergies, and current medications reviewed and updated in Epic    Interval History:  Interval History thinned 7/29/2022.  Please see previous notes for complete interval history.     Interval History thinned 1/27/2023. Please see previous note for complete interval history.    Interval History thinned 3/3/2023.  Please see previous notes for complete interval history.      3/3/2023 : Clinic visit with ADILSON Arthur, FNPEGGY-BC, CWDINESHN, CFTRACI.   Patient returns to clinic after hospitalization for UTI.  He states that overall he is feeling well, denies fevers, chills, nausea, vomiting, cough or shortness of breath.    Per last wound clinic note, a loose bone fragment was extracted from the wound bed of his left medial leg wound and sent for pathology.  Histology report described, \"extensively degenerated fibrocartilaginous tissue\", suggestive of adjacent osteomyelitis.  X-ray of tib-fib ordered to assess for OM.    3/10/2023: Clinic visit with Steve Hodges MD. Patient reports feeling in normal state of health. He obtained Xray left tib-fib, " we do not have images, but report states no evidence of acute pathology such as OM. We discussed options for more aggressive treatment, patient would prefer to watch and wait. Left heel remains open since hospitalization despite using Prevalon boots, will prescribe Prafo boot for offloading. Sacrum measures slightly smaller, does not appear significantly changed.    3/24/2023: Clinic visit with Steve Hodges MD. Patient reports feeling well. He obtained PRAFO boot and left heel wound smaller. Rest of wounds are not significantly changed. New linear friction wound buttocks. Patient reports occurred when transitioning from laying to sitting with his low airloss mattress.    3/31/2023 : Clinic visit with Kelly Sandy, ADILSON, FNP-BC, CWDINESHN, CFCN.   Anjum presents today complaining of cold symptoms.  States he has had an upper respiratory illness for a couple of weeks now but feels he is getting better.  He does have a new tender area to his plantar foot with mild discoloration, possibly caused by seam on insole to PRAFO boot.  He has stopped wearing the PRAFO and is now wearing Prevalon boot, and feels this is improving.  The left medial lower leg wound does show some improvement, with decrease in area.  Sacral wound is about the same.  The left heel ulcer is again almost healed, with thin layer of epithelium noted to wound bed, scant drainage.    4/7/2023: Clinic visit with Steve Hodges MD. Patient reports doing well. Excited for seeing family for Lamar and SO is coming to visit. Patient denies any symptoms of infection. His left medial leg wound is covered with thin, fragile epithelium and boggy due to poor quality of underlying tissue rather than abscess or fluid collection. Left plantar foot has opened slightly but appears improving, left heel is smaller. He continues to use Prevalon boots. Patient reports new abrasion to lateral right knee from rubbing on the dashboard, does not appear open. Home health has  been applying foam dressing for padding.    4/21/2023: Clinic visit with Steve Hodges MD. Patient reports feeling well, denies any symptoms of infection. He has been using Prevalon boots 24 hours a day, despite this, he has worsening left heel pressure injury. We discussed other methods and he will try to use pillow under leg to offload heel. We also applied an allyven dressing to plantar foot to reduce friction and pressure and he can try to use PRAFO boot. His heel does not appear to be in contact with any solid surface on his wheelchair. Other wounds are unchanged. Due to worsening left heel wound, will bring back to clinic next week for evaluation.    4/28/2023: Clinic visit with Steve Hodges MD. Patient reports feeling well, denies any fevers or chills. He has been using pillow under leg to completely offload heel at night and both heel / plantar foot wound improved. He reports with the nice weather he has been spending majority of his day up in chair outside. Unfortunately his sacral wound larger and bone palpable. He is unsure if he would want to be evaluated again by surgery for possible flap.    5/5/2023: Clinic visit with Steve Hodges MD. Patient denies any complaints today. He reports that he has spent less time in wheelchair in effort to improve offloading. Patients lower extremity wounds are fairly stable. Sacral wound appears stable with small partial thickness linear wound superior. He does not recall any specific injury.    5/12/2023 : Clinic visit with ADILSON Arthur, HOLDEN, IMAN, LILIYA.   Anjum continues to feel well overall.  His wounds continue to wax and wane.  He states that his low-air-loss bed was malfunctioning, was bottoming out, has since been repaired.  He continues to spend much of his day up in his wheelchair.    5/19/2023 : Clinic visit with ADILSON Arthur, HOLDEN, IMAN, LILIYA.   Anjum states he is feeling very well today.  Sacral wound about the same, lateral leg wound  continues to wax and wane.  Heel wound is improving steadily.  Plantar foot wound slowly progressing.    5/26/2023 : Clinic visit with ADILSON Arthur, HOLDEN, IMAN, LILIYA.   Continues to feel well.  His heel ulcer is healed, though covered with fragile epithelium.  Medial leg wound has deteriorated slightly.  Sacral and plantar foot wounds are both improved.    6/2/2023 : Clinic visit with ADILSON Arthur FNP-BC, LILIYA VALERIO.   Anjum informed us prior to today's visit that he tested positive for COVID earlier this week.  His symptoms have resolved, and he is wearing an N95 mask today, as am I and the wound nurse.  He states that today he is feeling well, denies fevers, chills, nausea, vomiting, cough or shortness of breath.    Unfortunately, his left medial lower leg wound has deteriorated significantly.  This has happened before, underlying osteomyelitis is suspected, however he does not wish to undergo amputation, nor does his surgeon wish to do so.  Tissue quality of this wound has been poor for some time.   His sacral wound is slowly progressing.  Plantar foot wound is also slowly getting better.   His left heel ulcer, previously resolved, presents today with discoloration.  Suspect DTI.  Will need to be monitored      REVIEW OF SYSTEMS:   Unchanged from previous wound clinic assessment on 5/26/2023, except as noted in interval history above     PHYSICAL EXAMINATION:   /65   Pulse (!) 55   Temp 36.9 °C (98.5 °F) (Temporal)   Resp 18   SpO2 95%   Physical Exam  Constitutional:       Appearance: He is obese.   Cardiovascular:      Rate and Rhythm: Normal rate.   Pulmonary:      Effort: Pulmonary effort is normal.   Abdominal:      Comments: Colostomy left lower quadrant   Genitourinary:     Comments: Suprapubic catheter  Skin:     Comments: Stage IV coccygeal pressure injury -wound area measures smaller today, however this may be positional.  Quality of tissue and wound bed is improving, with  increased granulation, moderate serosanguineous drainage, no evidence of infection  Full-thickness wound to left medial lower leg -total wound area has increased in difficultly since last assessment, poor tissue quality, periwound ecchymotic, moderate serosanguineous drainage    Stage III pressure injury left posterior heel -wound appears to be resolved, however presents today with discoloration-suspect DTI  Stage III pressure injury plantar foot -wound area and depth continues to decrease slowly, slough to wound bed, moderate serosanguineous drainage, no evidence of infection.      Refer to wound flowsheet and photos   Neurological:      Mental Status: He is alert and oriented to person, place, and time.   Psychiatric:         Mood and Affect: Mood normal.         WOUND ASSESSMENT  Wound 02/27/23 Pressure Injury Coccyx Stage IV (Active)   Wound Image    06/02/23 1600   Site Assessment Red;Yellow 06/02/23 1600   Periwound Assessment Fragile;Scar tissue;Denuded 06/02/23 1600   Margins Unattached edges 06/02/23 1600   Drainage Amount Large 06/02/23 1600   Drainage Description Serosanguineous 06/02/23 1600   Treatments Cleansed;Provider debridement;Site care 06/02/23 1600   Wound Cleansing Puracyn [place] 06/02/23 1600   Periwound Protectant Barrier Paste 06/02/23 1600   Dressing Cleansing/Solutions Normal Saline 06/02/23 1600   Dressing Options Collagen Dressing;Hydrofiber Silver Strip;Hydrofiber Silver;Other (Comments) 06/02/23 1600   Dressing Changed Changed 06/02/23 1600   Dressing Change/Treatment Frequency Monday, Wednesday, Friday, and As Needed 03/31/23 1527   WOUND NURSE ONLY - Pressure Injury Stage 4 06/02/23 1600   Wound Length (cm) 3.5 cm 06/02/23 1600   Wound Width (cm) 1.3 cm 06/02/23 1600   Wound Depth (cm) 3.2 cm 06/02/23 1600   Wound Surface Area (cm^2) 4.55 cm^2 06/02/23 1600   Wound Volume (cm^3) 14.56 cm^3 06/02/23 1600   Post-Procedure Length (cm) 3.5 cm 06/02/23 1600   Post-Procedure Width (cm) 1.3  cm 06/02/23 1600   Post-Procedure Depth (cm) 1.4 cm 06/02/23 1600   Post-Procedure Surface Area (cm^2) 4.55 cm^2 06/02/23 1600   Post-Procedure Volume (cm^3) 6.37 cm^3 06/02/23 1600   Wound Healing % -300 06/02/23 1600   Tunneling (cm) 0 cm 06/02/23 1600   Tunneling Clock Position of Wound 12 05/19/23 1400   Undermining (cm) 2.5 cm 06/02/23 1600   Undermining of Wound, 1st Location From 12 o'clock;To 4 o'clock 06/02/23 1600   Undermining (cm) - 2nd location 0.6 cm 06/02/23 1600   Undermining of Wound, 2nd Location From 10 o'clock;To 11 o'clock 06/02/23 1600   Wound Odor None 06/02/23 1600   Exposed Structures None 06/02/23 1600   Number of days: 95       Wound 02/27/23 Heel Posterior Left (Active)   Wound Image   06/02/23 1600   Site Assessment Purple;Epithelialization;Fragile 06/02/23 1600   Periwound Assessment Blanchable erythema;Fragile;Scar tissue 06/02/23 1600   Margins Attached edges 05/26/23 1400   Drainage Amount None 06/02/23 1600   Drainage Description Serosanguineous 05/19/23 1400   Treatments Cleansed;Site care 06/02/23 1600   Wound Cleansing Foam Cleanser/Washcloth 06/02/23 1600   Periwound Protectant Not Applicable 06/02/23 1600   Dressing Cleansing/Solutions Not Applicable 06/02/23 1600   Dressing Options Other (Comments) 06/02/23 1600   Dressing Changed Changed 06/02/23 1600   Dressing Change/Treatment Frequency Monday, Wednesday, Friday, and As Needed 06/02/23 1600   WOUND NURSE ONLY - Pressure Injury Stage O 06/02/23 1600   Wound Length (cm) 1 cm 05/19/23 1400   Wound Width (cm) 0.7 cm 05/19/23 1400   Wound Depth (cm) 0 cm 05/19/23 1400   Wound Surface Area (cm^2) 0.7 cm^2 05/19/23 1400   Wound Volume (cm^3) 0 cm^3 05/19/23 1400   Post-Procedure Length (cm) 0.7 cm 05/19/23 1400   Post-Procedure Width (cm) 0.3 cm 05/19/23 1400   Post-Procedure Depth (cm) 0 cm 05/19/23 1400   Post-Procedure Surface Area (cm^2) 0.21 cm^2 05/19/23 1400   Post-Procedure Volume (cm^3) 0 cm^3 05/19/23 1400   Wound  Healing % 100 05/19/23 1400   Tunneling (cm) 0 cm 06/02/23 1600   Undermining (cm) 0 cm 06/02/23 1600   Wound Odor None 06/02/23 1600   Exposed Structures None 06/02/23 1600   Number of days: 95       Wound 03/03/23 Full Thickness Wound Leg LLE medial (Active)   Wound Image    06/02/23 1600   Site Assessment Red;Purple;Boggy;Fragile 06/02/23 1600   Periwound Assessment Scar tissue;Fragile;Hyperpigmented 06/02/23 1600   Margins Unattached edges 06/02/23 1600   Drainage Amount Moderate 06/02/23 1600   Drainage Description Serosanguineous;Sanguineous 06/02/23 1600   Treatments Cleansed;Provider debridement;Site care 06/02/23 1600   Wound Cleansing Normal Saline Irrigation 06/02/23 1600   Periwound Protectant Skin Protectant Wipes to Periwound;Barrier Paste 06/02/23 1600   Dressing Cleansing/Solutions Not Applicable 06/02/23 1600   Dressing Options Hydrofiber Silver Strip;Hydrofiber Silver;Silicone Adhesive Foam;Tubigrip 06/02/23 1600   Dressing Changed Changed 06/02/23 1600   Dressing Change/Treatment Frequency Monday, Wednesday, Friday, and As Needed 03/31/23 1527   Non-staged Wound Description Full thickness 06/02/23 1600   Wound Length (cm) 3.2 cm 06/02/23 1600   Wound Width (cm) 2.7 cm 06/02/23 1600   Wound Depth (cm) 1.4 cm 06/02/23 1600   Wound Surface Area (cm^2) 8.64 cm^2 06/02/23 1600   Wound Volume (cm^3) 12.096 cm^3 06/02/23 1600   Post-Procedure Length (cm) 3.2 cm 06/02/23 1600   Post-Procedure Width (cm) 3 cm 06/02/23 1600   Post-Procedure Depth (cm) 1.5 cm 06/02/23 1600   Post-Procedure Surface Area (cm^2) 9.6 cm^2 06/02/23 1600   Post-Procedure Volume (cm^3) 14.4 cm^3 06/02/23 1600   Wound Healing % -3932 06/02/23 1600   Tunneling (cm) 0 cm 06/02/23 1600   Undermining (cm) 1.3 cm 06/02/23 1600   Undermining of Wound, 1st Location From 1 o'clock;To 2 o'clock 06/02/23 1600   Undermining (cm) - 2nd location 1.3 cm 06/02/23 1600   Undermining of Wound, 2nd Location From 3 o'clock;To 4 o'clock 06/02/23 1600    Wound Odor None 06/02/23 1600   Exposed Structures None 06/02/23 1600   Number of days: 91       Wound 03/31/23 Pressure Injury Foot Plantar;Distal Left (Active)   Wound Image    06/02/23 1600   Site Assessment Red;Yellow 06/02/23 1600   Periwound Assessment Dry;Intact;Edema 06/02/23 1600   Margins Unattached edges 06/02/23 1600   Drainage Amount Moderate 06/02/23 1600   Drainage Description Serosanguineous 06/02/23 1600   Treatments Cleansed;Provider debridement;Site care 06/02/23 1600   Wound Cleansing Normal Saline Irrigation 06/02/23 1600   Periwound Protectant Skin Protectant Wipes to Periwound;Barrier Paste 06/02/23 1600   Dressing Cleansing/Solutions Not Applicable 06/02/23 1600   Dressing Options Hydrofiber Silver Strip;Hydrofiber Silver;Silicone Adhesive Foam;Tubigrip 06/02/23 1600   Dressing Changed Changed 06/02/23 1600   WOUND NURSE ONLY - Pressure Injury Stage 3 06/02/23 1600   Wound Length (cm) 0.5 cm 06/02/23 1600   Wound Width (cm) 0.7 cm 06/02/23 1600   Wound Depth (cm) 0.4 cm 06/02/23 1600   Wound Surface Area (cm^2) 0.35 cm^2 06/02/23 1600   Wound Volume (cm^3) 0.14 cm^3 06/02/23 1600   Post-Procedure Length (cm) 0.5 cm 06/02/23 1600   Post-Procedure Width (cm) 1 cm 06/02/23 1600   Post-Procedure Depth (cm) 0.5 cm 06/02/23 1600   Post-Procedure Surface Area (cm^2) 0.5 cm^2 06/02/23 1600   Post-Procedure Volume (cm^3) 0.25 cm^3 06/02/23 1600   Wound Healing % -159 06/02/23 1600   Tunneling (cm) 0 cm 06/02/23 1600   Undermining (cm) 0 cm 06/02/23 1600   Wound Odor None 06/02/23 1600   Exposed Structures None 06/02/23 1600   Number of days: 63       PROCEDURE: Excisional debridement of sacral wound  -2% viscous lidocaine applied topically to wound bed for approximately 5 minutes prior to debridement  -Curette used to debride wound bed.  Excisional debridement was performed to remove devitalized tissue until healthy, bleeding tissue was visualized.   Entire surface of wound, 4.55 cm² debrided.   Tissue debrided into the muscle / fascia layer.    -Bleeding controlled with manual pressure.    -Wound care completed by wound RN, refer to flowsheet  -Patient tolerated the procedure well, without c/o pain or discomfort.      PROCEDURE: Excisional debridement of left medial lower leg wound, and left foot plantar wound  -2% viscous lidocaine applied topically to wound bed for approximately 5 minutes prior to debridement  -Curette used to debride wound bed.  Excisional debridement was performed to remove devitalized tissue until healthy, bleeding tissue was visualized.   Entire surface of wound, 10.1 cm² debrided.  Tissue debrided into the subcutaneous layer.    -Bleeding controlled with manual pressure.    -Wound care completed by wound RN, refer to flowsheet  -Patient tolerated the procedure well, without c/o pain or discomfort.        BIOLOGIC LOG  5/20/2022-first application.  PRODUCT: EpiCord 2 x 3 cm . PRODUCT #[id number]; EXPIRES: 2026-12-01 5/27/2022- second application.  PRODUCT: EpiCordEX 2 x 3 cm . PRODUCT #[id number]; EXPIRES: 2026-09-01    6/3/2022- third application.  PRODUCT: EpiCordEX 2 x 3 cm . PRODUCT #[id number]; EXPIRES: 2026-10-01    6/10/2022- fourth application.  PRODUCT: EpiCordEX 2 x 3 cm . PRODUCT #[id number]; EXPIRES: 2026-09-01 6/17/2022- fifth application.  PRODUCT: EpiCordEX 2 x 3 cm . PRODUCT #[id number]; EXPIRES: 2027-02-01 6/24/2022- sixth application.  PRODUCT: EpiCordEX 2 x 3 cm . PRODUCT #[id number]; EXPIRES: 2027-02-01 07/01/22: Seventh application.  Product: Epicort Dx 2.0 x 3.0 cm reorder number [id number]; product number [id number]; expires 2027-02-01 7/22/2022- eighth application.  PRODUCT: EpiCord 2 x 3 cm, reorder #[id number]. PRODUCT #[id number]; EXPIRES: 2027-02-01      Pertinent Labs and Diagnostics:    Labs:     A1c: No results found for: HBA1C     [facility] results, 7/1/2022 (under media tab)    CRP  13    ESR 31      IMAGING:     10/3/2022-CT of pelvis with contrast  IMPRESSION:     1.  Posterior soft tissue sacral wound and 1.5 x 3.7 cm abscess.   2.  Progressive destruction of the distal sacrum and proximal coccyx. Sclerosis and irregularity of the distal sacrum consistent with osteomyelitis.   3.  Old wound within the soft tissues posterior to the distal left SI joint with possible fistulous communication.    10/3/2022-CT of tib-fib with and without contrast, with reconstruction  IMPRESSION:     1.  Old fractures of the left tibia and fibula with extensive callus formation and bony bridging as well as extensive dystrophic calcifications. Similar to previous.   2.  Distal medial soft tissue wounds with ill-defined superficial in the soft tissue thickening and fluid collections suggestive of phlegmon. No organized abscess identified.   3.  No definite osteomyelitis.   4.  Arthritic changes.        VASCULAR STUDIES: No results found.    LAST  WOUND CULTURE:   Lab Results   Component Value Date/Time    CULTRSULT Mixed enteric ade >100,000 cfu/mL 04/15/2023 04:37 PM      PATHOLOGY  2/17/2023-bone fragment extracted from left lower extremity wound\\  FINAL DIAGNOSIS:     A. Left leg bone fragment at base of chronic wound:          Extensively degenerated fibrocartilaginous tissue with a rim of           fibrinopurulent debris          Correlate with culture findings            Comment: While no residual intact bone is identified, these           findings are suggestive of adjacent osteomyelitis.      ASSESSMENT AND PLAN:     1. Sacral decubitus ulcer, stage IV ([facility])  Comments: Ulcer first noted in early April 2022 as small open area which quickly enlarged.  This ulcer is present distal from previous sacral ulcer which healed after surgery in January.  Patient has history of flap reconstruction x2 to this area.  CT shows progressive destruction of distal sacrum and proximal coccyx.    6/2/2023: Wound is slowly  progressing, area has decreased.  Granulation tissue noted.  Thin layer of slough to wound bed  -Excisional debridement of wound in clinic today, medically necessary to promote wound healing.  -Home health to change dressing 1-2 times per week in between clinic visits  -Patient does spend a lot of time up in his wheelchair, and feels this is necessary for his quality of life  - Patient does not currently have follow up with Dr. Saini. If wound deteriorates or fails to improve can consider re-establishing with Dr. Saini for evaluation for coverage options. Patient does not want to pursue at this time and is happy with current conservative approach even with wound worsening.  -Roho cushion in wheelchair, appears to be in good condition.  Patient is well versed on offloading techniques    Wound care: Hydrofiber silver strip, hydrofiber silver, Mepilex    2. Postoperative wound dehiscence, subsequent encounter  Comments: On 4/26/2022 patient underwent irrigation and debridement of multiple compartments of the left lower extremity states for pressure injury, with bone excision, and complex closure of chronic wound using biologic skin substitute.  During postop visit in surgeons office on 5/11, surgical site was noted to be dehisced.  Patient was referred to wound clinic for management.    6/2/2023: This wound has deteriorated significantly since last assessment.  Poor tissue quality as it has been all along.  Periwound is ecchymotic.  -Excisional debridement of wound in clinic today, medically necessary to promote wound healing.  -Patient to return to clinic weekly for assessment and debridement  -Home health to change dressing 1-2 times per week in between clinic visits OM.  -Suspect underlying OM, however patient does not wish to consider surgical options at this time nor does his surgeon wish to do any further surgery to this leg.  -Patient to be mindful of offloading when supine, should assure that his leg is not  rotating medially  Wound care: Hydrofiber silver, silicone adhesive foam, tubigrip    3. Deep tissue injury  4. Pressure injury of left foot, stage III  Comments: DTI to posterior left lower leg first observed in clinic 7/29/2022.  Patient does not know how it started, had been wearing heel float boot consistently.  Evolved into stage III pressure injury    6/2/2023: Wound area and depth of plantar foot ulcer slowly decreasing.  DTI to posterior leg remains stable  -Excisional debridement of plantar foot wound in clinic today, medically necessary to promote wound healing.  -Patient to return to clinic weekly for assessment and debridement  -Home health to change dressing 1-2 times per week in between clinic visits     Wound care: Hydrofiber silver, Mepilex, Tubigrip D    5. Pressure injury of left heel, stage 3 (HCC)  Comments: First noted in clinic on 10/21/2022, originally noted to be stage II    6/2/2023: This wound appears to be nearly resolved last visit.  However today presents with discoloration, suspected DTI  -Monitor each clinic visit, observe for deterioration  - Patient continue wearing heel float boots at all times  - Heel does not appear to contact any solid surfaces while in wheelchair       Wound Care: Hydrofiber Silver, Heel Mepilex to reduce pressure and friction    6. Quadriplegia, C5-C7, incomplete (HCC)  Comments: Complicating factor.  Impaired mobility and sensation  -Patient is still spending 7-8 hours/day up in his wheelchair, though he does have appropriate offloading cushion, and knows to reposition frequently.  Wears heel float boots bilaterally at all times        Please note that this note may have been created using voice recognition software. I have worked with technical experts from [employer] to optimize the interface.  I have made every reasonable attempt to correct obvious errors, but there may be errors of grammar and possibly content that I did not discover before finalizing  the note.

## 2023-06-09 ENCOUNTER — OFFICE VISIT (OUTPATIENT)
Dept: WOUND CARE | Facility: MEDICAL CENTER | Age: 73
End: 2023-06-09
Attending: INTERNAL MEDICINE
Payer: MEDICARE

## 2023-06-09 VITALS
RESPIRATION RATE: 17 BRPM | HEART RATE: 52 BPM | TEMPERATURE: 98.9 F | OXYGEN SATURATION: 96 % | SYSTOLIC BLOOD PRESSURE: 98 MMHG | DIASTOLIC BLOOD PRESSURE: 55 MMHG

## 2023-06-09 DIAGNOSIS — T81.31XD POSTOPERATIVE WOUND DEHISCENCE, SUBSEQUENT ENCOUNTER: ICD-10-CM

## 2023-06-09 DIAGNOSIS — L89.894 PRESSURE INJURY OF LEFT FOOT, STAGE 4 (HCC): ICD-10-CM

## 2023-06-09 DIAGNOSIS — T14.8XXA DEEP TISSUE INJURY: ICD-10-CM

## 2023-06-09 DIAGNOSIS — M86.9 OSTEOMYELITIS OF LEFT LOWER EXTREMITY (HCC): ICD-10-CM

## 2023-06-09 DIAGNOSIS — L89.154 SACRAL DECUBITUS ULCER, STAGE IV (HCC): ICD-10-CM

## 2023-06-09 DIAGNOSIS — L89.623 PRESSURE INJURY OF LEFT HEEL, STAGE 3 (HCC): ICD-10-CM

## 2023-06-09 DIAGNOSIS — G82.54 QUADRIPLEGIA, C5-C7 INCOMPLETE (HCC): Primary | ICD-10-CM

## 2023-06-09 PROCEDURE — 3078F DIAST BP <80 MM HG: CPT | Performed by: STUDENT IN AN ORGANIZED HEALTH CARE EDUCATION/TRAINING PROGRAM

## 2023-06-09 PROCEDURE — 11043 DBRDMT MUSC&/FSCA 1ST 20/<: CPT

## 2023-06-09 PROCEDURE — 3074F SYST BP LT 130 MM HG: CPT | Performed by: STUDENT IN AN ORGANIZED HEALTH CARE EDUCATION/TRAINING PROGRAM

## 2023-06-09 PROCEDURE — 11042 DBRDMT SUBQ TIS 1ST 20SQCM/<: CPT | Mod: 59 | Performed by: STUDENT IN AN ORGANIZED HEALTH CARE EDUCATION/TRAINING PROGRAM

## 2023-06-09 PROCEDURE — 11043 DBRDMT MUSC&/FSCA 1ST 20/<: CPT | Performed by: STUDENT IN AN ORGANIZED HEALTH CARE EDUCATION/TRAINING PROGRAM

## 2023-06-09 PROCEDURE — 11042 DBRDMT SUBQ TIS 1ST 20SQCM/<: CPT

## 2023-06-09 NOTE — PATIENT INSTRUCTIONS
-Keep your wound dressing clean, dry, and intact.    -Change your dressing if it becomes soiled, soaked, or falls off.    -Should you experience any significant changes in your wound(s), such as infection (redness, swelling, localized heat, increased pain, fever > 101 F, chills) or have any questions regarding your home care instructions, please contact the wound center at [phone]. If after hours, contact your primary care physician or go to the hospital emergency room.

## 2023-06-09 NOTE — PROGRESS NOTES
Provider Encounter- Pressure Injury        HISTORY OF PRESENT ILLNESS  Wound History:    START OF CARE IN CLINIC: 3/3/2023 (return to clinic after hospitalization)    REFERRING PROVIDER: Sahara Mendez      WOUNDS- Pressure Injury, Sacrococcygeal, stage IV                                                             Surgical wound dehiscence, left medial lower leg-status post surgical I&D of stage IV pressure injury and           biologic application                    Pressure injury, DTI, left posterior lower leg-first observed in clinic 7/29/2022       Pressure injury stage III L heel - first noted 10/21     Right 2nd toe avulsion - first noted 10/21     Skin tag left posterior ear - first noted 10/21     HISTORY: Patient with history of incomplete quadriplegia referred to [facility] for treatment of a stage IV pressure injury.  He has a history of previous pressure injuries to this area, and underwent muscle flaps in 2019, and then again in 2020.  He was seen in the wound clinic in November 2021 for an ulcer proximal from his current ulcer, and pressure injuries to his left posterior lower leg and left heel.  At that time, it was discovered that the patient had retained VAC foam embedded in the wound bed of the sacral wound.  Attempts were made to get him back to his plastic surgeon, though unsuccessful.  In January he underwent surgical removal of VAC sponge along with excisional debridement of his sacral wound by Dr. Chaves.  After the surgery, his wound went on to heal without incident.   In early April 2022, his home health nurse noted a new sacral ulcer, below the previous ulcer which quickly tripled in size over the following weeks.  The ulcer to his left medial lower leg had also deteriorated, with bone visible at the base..  He was hospitalized from 4/22 until 4/27/2022 and underwent surgery with Dr. Raman on 4/26 for irrigation and debridement of multiple compartments of the left lower extremity, bone  excision, and complex closure of chronic wound using biologic skin substitute.   His sacrococcygeal wound was not surgically addressed during this admission.  He was discharged back to his group home, with home health, and referral to outpatient wound clinic for his sacral wound.  He was instructed to follow-up with his surgeon for his lower leg wound.       Postoperatively, the left medial lower leg incision dehisced.  He was seen by his surgeon at [facility] on 5/11.  The surgeon opted to leave remaining sutures in place, and refer him to the wound clinic for treatment of this wound.   Treatment of this wound was initiated in clinic on 5/12.  During this visit was also noted that his heel DTI had resolved, but that he had a new pressure injury to his left posterior lower extremity.     A new pressure injury was noted to patient's right upper buttock/lower back on 5/20/2022.  Wound was linear in shape, skin discolored but intact.     Abrasion noted to left anterior lower leg.  First observed in clinic on 7/22/2022.  Patient states he bumped his leg into his food tray.     Small DTI noted to patient's left lateral lower leg on 7/29/2022.  Skin intact but discolored.     Large area of deep tissue injury noted to patient's left exterior lower leg.  Patient denied any trauma to this area.  Skin intact.  Wound documented.    1/27/2023: [place] [facility] [place] with gross hematuria. He underwent RICHARD which showed watchman device was in place and he was taken off of Xarelto. While hospitalized wound team was consulted. He was referred back to [facility] and home health upon discharge.    Patient was hospitalized at [facility] for pyelonephritis from 2/26 until 3/2/2023, admitted for fever and general malaise.  He was admitted and initially started on linezolid and meropenem for suspected UTI and history of multidrug-resistant organisms.  Urine cultures were negative. ID was consulted, recommended CT of chest and  "abdomen,which were negative for acute findings. However, he was treated with 5 days total course of antibiotics for suspected UTI, and symptoms completely resolved.  During this admission, the inpatient wound team was consulted for treatment of his sacral and lower leg wounds.  A wound culture was taken from his left heel pressure ulcer, negative.  Once stabilized, he was discharged home and referred back to [facility] to resume treatment of his wounds.    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  Never used smokeless tobacco    Patient's problem list, allergies, and current medications reviewed and updated in Epic    Interval History:  Interval History thinned 7/29/2022.  Please see previous notes for complete interval history.     Interval History thinned 1/27/2023. Please see previous note for complete interval history.    Interval History thinned 3/3/2023.  Please see previous notes for complete interval history.      3/3/2023 : Clinic visit with ADILSON Arthur, FNPEGGY-BC, CWDINESHN, CFTRACI.   Patient returns to clinic after hospitalization for UTI.  He states that overall he is feeling well, denies fevers, chills, nausea, vomiting, cough or shortness of breath.    Per last wound clinic note, a loose bone fragment was extracted from the wound bed of his left medial leg wound and sent for pathology.  Histology report described, \"extensively degenerated fibrocartilaginous tissue\", suggestive of adjacent osteomyelitis.  X-ray of tib-fib ordered to assess for OM.    3/10/2023: Clinic visit with Steve Hodges MD. Patient reports feeling in normal state of health. He obtained Xray left tib-fib, " we do not have images, but report states no evidence of acute pathology such as OM. We discussed options for more aggressive treatment, patient would prefer to watch and wait. Left heel remains open since hospitalization despite using Prevalon boots, will prescribe Prafo boot for offloading. Sacrum measures slightly smaller, does not appear significantly changed.    3/24/2023: Clinic visit with Steve Hodges MD. Patient reports feeling well. He obtained PRAFO boot and left heel wound smaller. Rest of wounds are not significantly changed. New linear friction wound buttocks. Patient reports occurred when transitioning from laying to sitting with his low airloss mattress.    3/31/2023 : Clinic visit with Kelly Sandy, ADILSON, FNP-BC, CWDINESHN, CFCN.   Anjum presents today complaining of cold symptoms.  States he has had an upper respiratory illness for a couple of weeks now but feels he is getting better.  He does have a new tender area to his plantar foot with mild discoloration, possibly caused by seam on insole to PRAFO boot.  He has stopped wearing the PRAFO and is now wearing Prevalon boot, and feels this is improving.  The left medial lower leg wound does show some improvement, with decrease in area.  Sacral wound is about the same.  The left heel ulcer is again almost healed, with thin layer of epithelium noted to wound bed, scant drainage.    4/7/2023: Clinic visit with Steve Hodges MD. Patient reports doing well. Excited for seeing family for Lam[place] and SO is coming to visit. Patient denies any symptoms of infection. His left medial leg wound is covered with thin, fragile epithelium and boggy due to poor quality of underlying tissue rather than abscess or fluid collection. Left plantar foot has opened slightly but appears improving, left heel is smaller. He continues to use Prevalon boots. Patient reports new abrasion to lateral right knee from rubbing on the dashboard, does not appear open. Home health has  been applying foam dressing for padding.    4/21/2023: Clinic visit with Steve Hodges MD. Patient reports feeling well, denies any symptoms of infection. He has been using Prevalon boots 24 hours a day, despite this, he has worsening left heel pressure injury. We discussed other methods and he will try to use pillow under leg to offload heel. We also applied an allyven dressing to plantar foot to reduce friction and pressure and he can try to use PRAFO boot. His heel does not appear to be in contact with any solid surface on his wheelchair. Other wounds are unchanged. Due to worsening left heel wound, will bring back to clinic next week for evaluation.    4/28/2023: Clinic visit with Steve Hodges MD. Patient reports feeling well, denies any fevers or chills. He has been using pillow under leg to completely offload heel at night and both heel / plantar foot wound improved. He reports with the nice weather he has been spending majority of his day up in chair outside. Unfortunately his sacral wound larger and bone palpable. He is unsure if he would want to be evaluated again by surgery for possible flap.    5/5/2023: Clinic visit with Steve Hodges MD. Patient denies any complaints today. He reports that he has spent less time in wheelchair in effort to improve offloading. Patients lower extremity wounds are fairly stable. Sacral wound appears stable with small partial thickness linear wound superior. He does not recall any specific injury.    5/12/2023 : Clinic visit with ADILSON Arthur, HOLDEN, IMAN, LILIYA.   Anjum continues to feel well overall.  His wounds continue to wax and wane.  He states that his low-air-loss bed was malfunctioning, was bottoming out, has since been repaired.  He continues to spend much of his day up in his wheelchair.    5/19/2023 : Clinic visit with ADILSON Arthur, HOLDEN, IMAN, LILIYA.   Anjum states he is feeling very well today.  Sacral wound about the same, lateral leg wound  continues to wax and wane.  Heel wound is improving steadily.  Plantar foot wound slowly progressing.    5/26/2023 : Clinic visit with ADILSON Arthur, HOLDEN, IMAN, LILIYA.   Continues to feel well.  His heel ulcer is healed, though covered with fragile epithelium.  Medial leg wound has deteriorated slightly.  Sacral and plantar foot wounds are both improved.    6/2/2023 : Clinic visit with ADILSON Arthur, HOLDEN, LILIYA VALERIO.   Anjum informed us prior to today's visit that he tested positive for COVID earlier this week.  His symptoms have resolved, and he is wearing an N95 mask today, as am I and the wound nurse.  He states that today he is feeling well, denies fevers, chills, nausea, vomiting, cough or shortness of breath.    Unfortunately, his left medial lower leg wound has deteriorated significantly.  This has happened before, underlying osteomyelitis is suspected, however he does not wish to undergo amputation, nor does his surgeon wish to do so.  Tissue quality of this wound has been poor for some time.   His sacral wound is slowly progressing.  Plantar foot wound is also slowly getting better.   His left heel ulcer, previously resolved, presents today with discoloration.  Suspect DTI.  Will need to be monitored    6/9/2023: Clinic visit with Steve Hodges MD. Anjum reports feeling better, has completely recovered from COVID and denies any complaints. His left leg wounds worsening with new left posterior leg wound, possibly related to the suspected OM. His plantar foot wound with fascia or tendon exposed in base. He does not want any surgical intervention such as amputation. Sacral wound is stable.      REVIEW OF SYSTEMS:   Unchanged from previous wound clinic assessment on 6/2/2023, except as noted in interval history above     PHYSICAL EXAMINATION:   BP 98/55   Pulse (!) 52   Temp 37.2 °C (98.9 °F) (Temporal)   Resp 17   SpO2 96%   Physical Exam  Constitutional:       Appearance: He is obese.    Cardiovascular:      Rate and Rhythm: Normal rate.   Pulmonary:      Effort: Pulmonary effort is normal.   Abdominal:      Comments: Colostomy left lower quadrant   Genitourinary:     Comments: Suprapubic catheter  Skin:     Comments: Stage IV coccygeal pressure injury -Stable. Quality of tissue and wound bed is improving, with increased granulation, moderate serosanguineous drainage, no evidence of infection    Full-thickness wound to left medial lower leg -Largely unchanged but appears to be slightly larger, poor tissue quality, periwound ecchymotic, moderate serosanguineous drainage  Full thickness wound to left posterior lower leg - Appears that may be related to OM of left lower extremity, fairly superficial without evidence of soft tissue infection.    Stage III pressure injury left posterior heel - Slightly open though there is epithelium still present on majority of wound.    Stage IV pressure injury plantar foot - Wound larger with depth increased, fascia or tendon exposed at base of wound, moderate serosanguineous drainage, no evidence of infection.      Refer to wound flowsheet and photos   Neurological:      Mental Status: He is alert and oriented to person, place, and time.   Psychiatric:         Mood and Affect: Mood normal.         WOUND ASSESSMENT  Wound 02/27/23 Pressure Injury Coccyx Stage IV (Active)   Wound Image    06/09/23 1400   Site Assessment Red;Yellow 06/09/23 1400   Periwound Assessment Fragile;Scar tissue 06/09/23 1400   Margins Unattached edges 06/09/23 1400   Drainage Amount Large 06/09/23 1400   Drainage Description Serosanguineous 06/09/23 1400   Treatments Cleansed;Provider debridement 06/09/23 1400   Wound Cleansing Puracyn Wautoma 06/09/23 1400   Periwound Protectant Barrier Paste 06/09/23 1400   Dressing Cleansing/Solutions Normal Saline 06/09/23 1400   Dressing Options Collagen Dressing;Hydrofiber Silver Strip;Hydrofiber Silver;Other (Comments) 06/09/23 1400   Dressing Changed  Changed 06/09/23 1400   Dressing Change/Treatment Frequency Monday, Wednesday, Friday, and As Needed 03/31/23 1527   WOUND NURSE ONLY - Pressure Injury Stage 4 06/09/23 1400   Wound Length (cm) 3 cm 06/09/23 1400   Wound Width (cm) 1.4 cm 06/09/23 1400   Wound Depth (cm) 1.3 cm 06/09/23 1400   Wound Surface Area (cm^2) 4.2 cm^2 06/09/23 1400   Wound Volume (cm^3) 5.46 cm^3 06/09/23 1400   Post-Procedure Length (cm) 2.9 cm 06/09/23 1400   Post-Procedure Width (cm) 1.4 cm 06/09/23 1400   Post-Procedure Depth (cm) 1.3 cm 06/09/23 1400   Post-Procedure Surface Area (cm^2) 4.06 cm^2 06/09/23 1400   Post-Procedure Volume (cm^3) 5.278 cm^3 06/09/23 1400   Wound Healing % -50 06/09/23 1400   Tunneling (cm) 0 cm 06/09/23 1400   Tunneling Clock Position of Wound 12 05/19/23 1400   Undermining (cm) 2.1 cm 06/09/23 1400   Undermining of Wound, 1st Location From 10 o'clock;To 2 o'clock 06/09/23 1400   Undermining (cm) - 2nd location 0.6 cm 06/02/23 1600   Undermining of Wound, 2nd Location From 10 o'clock;To 11 o'clock 06/02/23 1600   Wound Odor None 06/09/23 1400   Exposed Structures None 06/09/23 1400   Number of days: 102       Wound 02/27/23 Heel Posterior Left (Active)   Wound Image   06/09/23 1400   Site Assessment Red;Purple;Fragile 06/09/23 1400   Periwound Assessment Blanchable erythema;Fragile;Scar tissue 06/09/23 1400   Margins Attached edges 06/09/23 1400   Drainage Amount Small 06/09/23 1400   Drainage Description Serosanguineous 05/19/23 1400   Treatments Cleansed;Site care 06/09/23 1400   Wound Cleansing Foam Cleanser/Washcloth 06/09/23 1400   Periwound Protectant Barrier Paste 06/09/23 1400   Dressing Cleansing/Solutions Not Applicable 06/09/23 1400   Dressing Options Hydrofiber Silver;Other (Comments);Tubigrip 06/09/23 1400   Dressing Changed New 06/09/23 1400   Dressing Change/Treatment Frequency Monday, Wednesday, Friday, and As Needed 06/09/23 1400   WOUND NURSE ONLY - Pressure Injury Stage 3 06/09/23 1400    Wound Length (cm) 3.8 cm 06/09/23 1400   Wound Width (cm) 1 cm 06/09/23 1400   Wound Depth (cm) 0.1 cm 06/09/23 1400   Wound Surface Area (cm^2) 3.8 cm^2 06/09/23 1400   Wound Volume (cm^3) 0.38 cm^3 06/09/23 1400   Post-Procedure Length (cm) 0.7 cm 05/19/23 1400   Post-Procedure Width (cm) 0.3 cm 05/19/23 1400   Post-Procedure Depth (cm) 0 cm 05/19/23 1400   Post-Procedure Surface Area (cm^2) 0.21 cm^2 05/19/23 1400   Post-Procedure Volume (cm^3) 0 cm^3 05/19/23 1400   Wound Healing % 62 06/09/23 1400   Tunneling (cm) 0 cm 06/09/23 1400   Undermining (cm) 0 cm 06/09/23 1400   Wound Odor None 06/09/23 1400   Exposed Structures None 06/09/23 1400   Number of days: 102       Wound 03/03/23 Full Thickness Wound Leg LLE medial (Active)   Wound Image    06/09/23 1400   Site Assessment Red;Purple;Boggy;Fragile 06/09/23 1400   Periwound Assessment Scar tissue;Fragile;Hyperpigmented 06/09/23 1400   Margins Unattached edges 06/09/23 1400   Drainage Amount Moderate 06/09/23 1400   Drainage Description Serosanguineous 06/09/23 1400   Treatments Cleansed;Provider debridement 06/09/23 1400   Wound Cleansing Foam Cleanser/Washcloth 06/09/23 1400   Periwound Protectant Barrier Paste 06/09/23 1400   Dressing Cleansing/Solutions Not Applicable 06/09/23 1400   Dressing Options Hydrofiber Silver;Other (Comments);Tubigrip 06/09/23 1400   Dressing Changed Changed 06/09/23 1400   Dressing Change/Treatment Frequency Monday, Wednesday, Friday, and As Needed 03/31/23 1527   Non-staged Wound Description Full thickness 06/09/23 1400   Wound Length (cm) 2.5 cm 06/09/23 1400   Wound Width (cm) 3.1 cm 06/09/23 1400   Wound Depth (cm) 0.8 cm 06/09/23 1400   Wound Surface Area (cm^2) 7.75 cm^2 06/09/23 1400   Wound Volume (cm^3) 6.2 cm^3 06/09/23 1400   Post-Procedure Length (cm) 2.6 cm 06/09/23 1400   Post-Procedure Width (cm) 3.1 cm 06/09/23 1400   Post-Procedure Depth (cm) 0.8 cm 06/09/23 1400   Post-Procedure Surface Area (cm^2) 8.06 cm^2  06/09/23 1400   Post-Procedure Volume (cm^3) 6.448 cm^3 06/09/23 1400   Wound Healing % -1967 06/09/23 1400   Tunneling (cm) 0 cm 06/09/23 1400   Undermining (cm) 0 cm 06/09/23 1400   Undermining of Wound, 1st Location From 1 o'clock;To 2 o'clock 06/02/23 1600   Undermining (cm) - 2nd location 1.3 cm 06/02/23 1600   Undermining of Wound, 2nd Location From 3 o'clock;To 4 o'clock 06/02/23 1600   Wound Odor None 06/09/23 1400   Exposed Structures None 06/09/23 1400   Number of days: 98       Wound 03/31/23 Pressure Injury Foot Plantar;Distal Left (Active)   Wound Image    06/09/23 1400   Site Assessment Red;Yellow 06/09/23 1400   Periwound Assessment Dry;Edema;Callused;Blanchable erythema 06/09/23 1400   Margins Unattached edges 06/09/23 1400   Drainage Amount Moderate 06/09/23 1400   Drainage Description Serosanguineous 06/09/23 1400   Treatments Cleansed;Provider debridement 06/09/23 1400   Wound Cleansing Foam Cleanser/Washcloth 06/09/23 1400   Periwound Protectant Barrier Paste 06/09/23 1400   Dressing Cleansing/Solutions Normal Saline 06/09/23 1400   Dressing Options Collagen Dressing;Hydrofiber Silver;Other (Comments);Tubigrip 06/09/23 1400   Dressing Changed New 06/09/23 1400   WOUND NURSE ONLY - Pressure Injury Stage 4 06/09/23 1400   Wound Length (cm) 0.8 cm 06/09/23 1400   Wound Width (cm) 1.1 cm 06/09/23 1400   Wound Depth (cm) 0.6 cm 06/09/23 1400   Wound Surface Area (cm^2) 0.88 cm^2 06/09/23 1400   Wound Volume (cm^3) 0.528 cm^3 06/09/23 1400   Post-Procedure Length (cm) 0.9 cm 06/09/23 1400   Post-Procedure Width (cm) 1.1 cm 06/09/23 1400   Post-Procedure Depth (cm) 0.6 cm 06/09/23 1400   Post-Procedure Surface Area (cm^2) 0.99 cm^2 06/09/23 1400   Post-Procedure Volume (cm^3) 0.594 cm^3 06/09/23 1400   Wound Healing % -878 06/09/23 1400   Tunneling (cm) 0 cm 06/09/23 1400   Undermining (cm) 0 cm 06/09/23 1400   Wound Odor None 06/09/23 1400   Exposed Structures Fascia;Tendon 06/09/23 1400   Number of  days: 70       Wound 06/09/23 Full Thickness Wound Leg Posterior Left (Active)   Wound Image    06/09/23 1400   Site Assessment Red;Yellow;Purple 06/09/23 1400   Periwound Assessment Fragile;Hyperpigmented 06/09/23 1400   Margins Attached edges 06/09/23 1400   Drainage Amount Moderate 06/09/23 1400   Drainage Description Serosanguineous 06/09/23 1400   Treatments Cleansed;Provider debridement 06/09/23 1400   Wound Cleansing Foam Cleanser/Washcloth 06/09/23 1400   Periwound Protectant Barrier Paste 06/09/23 1400   Dressing Cleansing/Solutions Not Applicable 06/09/23 1400   Dressing Options Hydrofiber Silver;Other (Comments);Tubigrip 06/09/23 1400   Dressing Changed New 06/09/23 1400   Dressing Change/Treatment Frequency Every 72 hrs, and As Needed 06/09/23 1400   Non-staged Wound Description Full thickness 06/09/23 1400   Wound Length (cm) 1.8 cm 06/09/23 1400   Wound Width (cm) 2.8 cm 06/09/23 1400   Wound Depth (cm) 0.2 cm 06/09/23 1400   Wound Surface Area (cm^2) 5.04 cm^2 06/09/23 1400   Wound Volume (cm^3) 1.008 cm^3 06/09/23 1400   Post-Procedure Length (cm) 2 cm 06/09/23 1400   Post-Procedure Width (cm) 2.8 cm 06/09/23 1400   Post-Procedure Depth (cm) 0.2 cm 06/09/23 1400   Post-Procedure Surface Area (cm^2) 5.6 cm^2 06/09/23 1400   Post-Procedure Volume (cm^3) 1.12 cm^3 06/09/23 1400   Tunneling (cm) 0 cm 06/09/23 1400   Undermining (cm) 0 cm 06/09/23 1400   Wound Odor None 06/09/23 1400   Exposed Structures None 06/09/23 1400   Number of days: 0       PROCEDURE: Excisional debridement of sacral wound and left plantar pressure injury stage IV  -2% viscous lidocaine applied topically to wound bed for approximately 5 minutes prior to debridement  -Curette used to debride wound bed.  Excisional debridement was performed to remove devitalized tissue until healthy, bleeding tissue was visualized.   Entire surface of wound, 5.05 cm² debrided.  Tissue debrided into the muscle / fascia layer.    -Bleeding controlled  with manual pressure.    -Wound care completed by wound RN, refer to flowsheet  -Patient tolerated the procedure well, without c/o pain or discomfort.      PROCEDURE: Excisional debridement of left medial lower leg wound, and left posterior lower leg.  -2% viscous lidocaine applied topically to wound bed for approximately 5 minutes prior to debridement  -Curette used to debride wound bed.  Excisional debridement was performed to remove devitalized tissue until healthy, bleeding tissue was visualized.   Entire surface of wound, 14.2 cm² debrided.  Tissue debrided into the subcutaneous layer.    -Bleeding controlled with manual pressure.    -Wound care completed by wound RN, refer to flowsheet  -Patient tolerated the procedure well, without c/o pain or discomfort.        BIOLOGIC LOG  5/20/2022-first application.  PRODUCT: EpiCord 2 x 3 cm . PRODUCT #[id number]; EXPIRES: 2026-12-01 5/27/2022- second application.  PRODUCT: EpiCordEX 2 x 3 cm . PRODUCT #[id number]; EXPIRES: 2026-09-01    6/3/2022- third application.  PRODUCT: EpiCordEX 2 x 3 cm . PRODUCT #[id number]; EXPIRES: 2026-10-01    6/10/2022- fourth application.  PRODUCT: EpiCordEX 2 x 3 cm . PRODUCT #[id number]; EXPIRES: 2026-09-01 6/17/2022- fifth application.  PRODUCT: EpiCordEX 2 x 3 cm . PRODUCT #[id number]; EXPIRES: 2027-02-01 6/24/2022- sixth application.  PRODUCT: EpiCordEX 2 x 3 cm . PRODUCT #[id number]; EXPIRES: 2027-02-01 07/01/22: Seventh application.  Product: Epicort Dx 2.0 x 3.0 cm reorder number [id number]; product number [id number]; expires 2027-02-01 7/22/2022- eighth application.  PRODUCT: EpiCord 2 x 3 cm, reorder #[id number]. PRODUCT #[id number]; EXPIRES: 2027-02-01      Pertinent Labs and Diagnostics:    Labs:     A1c: No results found for: HBA1C     [facility] results, 7/1/2022 (under media tab)    CRP 13    ESR 31      IMAGING:     10/3/2022-CT of pelvis with  contrast  IMPRESSION:     1.  Posterior soft tissue sacral wound and 1.5 x 3.7 cm abscess.   2.  Progressive destruction of the distal sacrum and proximal coccyx. Sclerosis and irregularity of the distal sacrum consistent with osteomyelitis.   3.  Old wound within the soft tissues posterior to the distal left SI joint with possible fistulous communication.    10/3/2022-CT of tib-fib with and without contrast, with reconstruction  IMPRESSION:     1.  Old fractures of the left tibia and fibula with extensive callus formation and bony bridging as well as extensive dystrophic calcifications. Similar to previous.   2.  Distal medial soft tissue wounds with ill-defined superficial in the soft tissue thickening and fluid collections suggestive of phlegmon. No organized abscess identified.   3.  No definite osteomyelitis.   4.  Arthritic changes.        VASCULAR STUDIES: No results found.    LAST  WOUND CULTURE:   Lab Results   Component Value Date/Time    CULTRSULT Mixed enteric ade >100,000 cfu/mL 04/15/2023 04:37 PM      PATHOLOGY  2/17/2023-bone fragment extracted from left lower extremity wound\\  FINAL DIAGNOSIS:     A. Left leg bone fragment at base of chronic wound:          Extensively degenerated fibrocartilaginous tissue with a rim of           fibrinopurulent debris          Correlate with culture findings            Comment: While no residual intact bone is identified, these           findings are suggestive of adjacent osteomyelitis.      ASSESSMENT AND PLAN:     1. Sacral decubitus ulcer, stage IV ([facility])  Comments: Ulcer first noted in early April 2022 as small open area which quickly enlarged.  This ulcer is present distal from previous sacral ulcer which healed after surgery in January.  Patient has history of flap reconstruction x2 to this area.  CT shows progressive destruction of distal sacrum and proximal coccyx.    6/9/2023: Wound is stable. Granulation tissue noted.  Thin layer of slough to wound  bed  -Excisional debridement of wound in clinic today, medically necessary to promote wound healing.  -Home health to change dressing 1-2 times per week in between clinic visits  -Patient does spend a lot of time up in his wheelchair, and feels this is necessary for his quality of life  - Patient does not currently have follow up with Dr. Saini. If wound deteriorates or fails to improve can consider re-establishing with Dr. Saini for evaluation for coverage options. Patient does not want to pursue at this time and is happy with current conservative approach even with wound worsening.  -Carloso cushion in wheelchair, appears to be in good condition.  Patient is well versed on offloading techniques    Wound care: Hydrofiber silver strip, hydrofiber silver, Mepilex    2. Postoperative wound dehiscence, subsequent encounter  3. Osteomyelitis of left lower extremity  Comments: On 4/26/2022 patient underwent irrigation and debridement of multiple compartments of the left lower extremity states for pressure injury, with bone excision, and complex closure of chronic wound using biologic skin substitute.  During postop visit in surgeons office on 5/11, surgical site was noted to be dehisced.  Patient was referred to wound clinic for management.    6/9/2023: Wound continues to degrade with new posterior leg wound at same level as lateral wound.  Periwound is ecchymotic and tissue quality is poor and boggy.  -Excisional debridement of wound in clinic today, medically necessary to promote wound healing.  -Patient to return to clinic weekly for assessment and debridement  -Home health to change dressing 1-2 times per week in between clinic visits OM.  -Suspect underlying OM, however patient does not wish to consider surgical options at this time nor does his surgeon wish to do any further surgery to this leg. 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 There is no gross soft tissue infection and repeated  Abx treatment without source control is not indicated.  -Patient to be mindful of offloading when supine, should assure that his leg is not rotating medially  Wound care: Hydrofiber silver, silicone adhesive foam, tubigrip    3. Deep tissue injury  4. Pressure injury of left foot, stage IV  Comments: DTI to posterior left lower leg first observed in clinic 7/29/2022.  Patient does not know how it started, had been wearing heel float boot consistently.  Evolved into stage IV pressure injury    6/9/2023: Wound measuring larger with increased depth and now fascia or tendon exposed at base  -Excisional debridement of plantar foot wound in clinic today, medically necessary to promote wound healing.  - Patient does not want wound VAC, surgery. Discussed and he does not want aggressive care.  -Patient to return to clinic weekly for assessment and debridement  -Home health to change dressing 1-2 times per week in between clinic visits     Wound care: Hydrofiber silver, Mepilex, Tubigrip D    5. Pressure injury of left heel, stage 3 ([facility])  Comments: First noted in clinic on 10/21/2022, originally noted to be stage II    6/9/2023: This wound appears to be nearly resolved last visit. Now partially open.  -Monitor each clinic visit, observe for deterioration  - Patient continue wearing heel float boots at all times  - Heel does not appear to contact any solid surfaces while in wheelchair   Wound Care: Hydrofiber Silver, Heel Mepilex to reduce pressure and friction    6. Quadriplegia, C5-C7, incomplete ([facility])  Comments: Complicating factor.  Impaired mobility and sensation  -Patient is still spending 7-8 hours/day up in his wheelchair, though he does have appropriate offloading cushion, and knows to reposition frequently.  Wears heel float boots bilaterally at all times        Please note that this note may have been created using voice recognition software. I have worked with technical experts from [employer] to optimize the  interface.  I have made every reasonable attempt to correct obvious errors, but there may be errors of grammar and possibly content that I did not discover before finalizing the note.

## 2023-06-16 ENCOUNTER — OFFICE VISIT (OUTPATIENT)
Dept: WOUND CARE | Facility: MEDICAL CENTER | Age: 73
End: 2023-06-16
Attending: INTERNAL MEDICINE
Payer: MEDICARE

## 2023-06-16 VITALS
RESPIRATION RATE: 18 BRPM | HEART RATE: 57 BPM | TEMPERATURE: 97.1 F | DIASTOLIC BLOOD PRESSURE: 87 MMHG | SYSTOLIC BLOOD PRESSURE: 125 MMHG | OXYGEN SATURATION: 95 %

## 2023-06-16 DIAGNOSIS — T81.31XD POSTOPERATIVE WOUND DEHISCENCE, SUBSEQUENT ENCOUNTER: ICD-10-CM

## 2023-06-16 DIAGNOSIS — M86.9 OSTEOMYELITIS OF LEFT LOWER EXTREMITY (HCC): ICD-10-CM

## 2023-06-16 DIAGNOSIS — G82.54 QUADRIPLEGIA, C5-C7 INCOMPLETE (HCC): ICD-10-CM

## 2023-06-16 DIAGNOSIS — L89.623 PRESSURE INJURY OF LEFT HEEL, STAGE 3 (HCC): ICD-10-CM

## 2023-06-16 DIAGNOSIS — L89.154 SACRAL DECUBITUS ULCER, STAGE IV (HCC): ICD-10-CM

## 2023-06-16 DIAGNOSIS — T14.8XXA DEEP TISSUE INJURY: ICD-10-CM

## 2023-06-16 DIAGNOSIS — L89.894 PRESSURE INJURY OF LEFT FOOT, STAGE 4 (HCC): ICD-10-CM

## 2023-06-16 PROCEDURE — 11043 DBRDMT MUSC&/FSCA 1ST 20/<: CPT | Performed by: NURSE PRACTITIONER

## 2023-06-16 PROCEDURE — 3079F DIAST BP 80-89 MM HG: CPT | Performed by: NURSE PRACTITIONER

## 2023-06-16 PROCEDURE — 11043 DBRDMT MUSC&/FSCA 1ST 20/<: CPT

## 2023-06-16 PROCEDURE — 11042 DBRDMT SUBQ TIS 1ST 20SQCM/<: CPT

## 2023-06-16 PROCEDURE — 3074F SYST BP LT 130 MM HG: CPT | Performed by: NURSE PRACTITIONER

## 2023-06-16 PROCEDURE — 99213 OFFICE O/P EST LOW 20 MIN: CPT | Mod: 25 | Performed by: NURSE PRACTITIONER

## 2023-06-16 PROCEDURE — 99213 OFFICE O/P EST LOW 20 MIN: CPT | Mod: 25

## 2023-06-16 PROCEDURE — 11042 DBRDMT SUBQ TIS 1ST 20SQCM/<: CPT | Mod: 59 | Performed by: NURSE PRACTITIONER

## 2023-06-16 NOTE — PROGRESS NOTES
Provider Encounter- Pressure Injury        HISTORY OF PRESENT ILLNESS  Wound History:    START OF CARE IN CLINIC: 3/3/2023 (return to clinic after hospitalization)    REFERRING PROVIDER: Sahara Mendez      WOUNDS- Pressure Injury, Sacrococcygeal, stage IV                                                             Surgical wound dehiscence, left medial lower leg-status post surgical I&D of stage IV pressure injury and           biologic application                    Pressure injury, DTI, left posterior lower leg-first observed in clinic 7/29/2022       Pressure injury stage III L heel - first noted 10/21     Right 2nd toe avulsion - first noted 10/21     Skin tag left posterior ear - first noted 10/21     HISTORY: Patient with history of incomplete quadriplegia referred to [facility] for treatment of a stage IV pressure injury.  He has a history of previous pressure injuries to this area, and underwent muscle flaps in 2019, and then again in 2020.  He was seen in the wound clinic in November 2021 for an ulcer proximal from his current ulcer, and pressure injuries to his left posterior lower leg and left heel.  At that time, it was discovered that the patient had retained VAC foam embedded in the wound bed of the sacral wound.  Attempts were made to get him back to his plastic surgeon, though unsuccessful.  In January he underwent surgical removal of VAC sponge along with excisional debridement of his sacral wound by Dr. Chaves.  After the surgery, his wound went on to heal without incident.   In early April 2022, his home health nurse noted a new sacral ulcer, below the previous ulcer which quickly tripled in size over the following weeks.  The ulcer to his left medial lower leg had also deteriorated, with bone visible at the base..  He was hospitalized from 4/22 until 4/27/2022 and underwent surgery with Dr. Raman on 4/26 for irrigation and debridement of multiple compartments of the left lower extremity, bone  excision, and complex closure of chronic wound using biologic skin substitute.   His sacrococcygeal wound was not surgically addressed during this admission.  He was discharged back to his group home, with home health, and referral to outpatient wound clinic for his sacral wound.  He was instructed to follow-up with his surgeon for his lower leg wound.       Postoperatively, the left medial lower leg incision dehisced.  He was seen by his surgeon at [facility] on 5/11.  The surgeon opted to leave remaining sutures in place, and refer him to the wound clinic for treatment of this wound.   Treatment of this wound was initiated in clinic on 5/12.  During this visit was also noted that his heel DTI had resolved, but that he had a new pressure injury to his left posterior lower extremity.     A new pressure injury was noted to patient's right upper buttock/lower back on 5/20/2022.  Wound was linear in shape, skin discolored but intact.     Abrasion noted to left anterior lower leg.  First observed in clinic on 7/22/2022.  Patient states he bumped his leg into his food tray.     Small DTI noted to patient's left lateral lower leg on 7/29/2022.  Skin intact but discolored.     Large area of deep tissue injury noted to patient's left exterior lower leg.  Patient denied any trauma to this area.  Skin intact.  Wound documented.    1/27/2023: Patient was admitted to [facility] from 1/23-1/25/2023 with gross hematuria. He underwent RICHARD which showed watchman device was in place and he was taken off of Xarelto. While hospitalized wound team was consulted. He was referred back to [facility] and home health upon discharge.    Patient was hospitalized at [facility] for pyelonephritis from 2/26 until 3/2/2023, admitted for fever and general malaise.  He was admitted and initially started on linezolid and meropenem for suspected UTI and history of multidrug-resistant organisms.  Urine cultures were negative. ID was consulted, recommended CT of chest and  "abdomen,which were negative for acute findings. However, he was treated with 5 days total course of antibiotics for suspected UTI, and symptoms completely resolved.  During this admission, the inpatient wound team was consulted for treatment of his sacral and lower leg wounds.  A wound culture was taken from his left heel pressure ulcer, negative.  Once stabilized, he was discharged home and referred back to [facility] to resume treatment of his wounds.    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  Never used smokeless tobacco    Patient's problem list, allergies, and current medications reviewed and updated in Epic    Interval History:  Interval History thinned 7/29/2022.  Please see previous notes for complete interval history.     Interval History thinned 1/27/2023. Please see previous note for complete interval history.    Interval History thinned 3/3/2023.  Please see previous notes for complete interval history.      3/3/2023 : Clinic visit with ADILSON Arthur, FNPEGGY-BC, CWDINESHN, CFTRACI.   Patient returns to clinic after hospitalization for UTI.  He states that overall he is feeling well, denies fevers, chills, nausea, vomiting, cough or shortness of breath.    Per last wound clinic note, a loose bone fragment was extracted from the wound bed of his left medial leg wound and sent for pathology.  Histology report described, \"extensively degenerated fibrocartilaginous tissue\", suggestive of adjacent osteomyelitis.  X-ray of tib-fib ordered to assess for OM.    3/10/2023: Clinic visit with Steve Hodges MD. Patient reports feeling in normal state of health. He obtained Xray left tib-fib, " we do not have images, but report states no evidence of acute pathology such as OM. We discussed options for more aggressive treatment, patient would prefer to watch and wait. Left heel remains open since hospitalization despite using Prevalon boots, will prescribe Prafo boot for offloading. Sacrum measures slightly smaller, does not appear significantly changed.    3/24/2023: Clinic visit with Steve Hodges MD. Patient reports feeling well. He obtained PRAFO boot and left heel wound smaller. Rest of wounds are not significantly changed. New linear friction wound buttocks. Patient reports occurred when transitioning from laying to sitting with his low airloss mattress.    3/31/2023 : Clinic visit with Kelly Sandy, ADILSON, FNP-BC, CWDINESHN, CFCN.   Anjum presents today complaining of cold symptoms.  States he has had an upper respiratory illness for a couple of weeks now but feels he is getting better.  He does have a new tender area to his plantar foot with mild discoloration, possibly caused by seam on insole to PRAFO boot.  He has stopped wearing the PRAFO and is now wearing Prevalon boot, and feels this is improving.  The left medial lower leg wound does show some improvement, with decrease in area.  Sacral wound is about the same.  The left heel ulcer is again almost healed, with thin layer of epithelium noted to wound bed, scant drainage.    4/7/2023: Clinic visit with Steve Hodges MD. Patient reports doing well. Excited for seeing family for Lam[place] and SO is coming to visit. Patient denies any symptoms of infection. His left medial leg wound is covered with thin, fragile epithelium and boggy due to poor quality of underlying tissue rather than abscess or fluid collection. Left plantar foot has opened slightly but appears improving, left heel is smaller. He continues to use Prevalon boots. Patient reports new abrasion to lateral right knee from rubbing on the dashboard, does not appear open. Home health has  been applying foam dressing for padding.    4/21/2023: Clinic visit with Steve Hodges MD. Patient reports feeling well, denies any symptoms of infection. He has been using Prevalon boots 24 hours a day, despite this, he has worsening left heel pressure injury. We discussed other methods and he will try to use pillow under leg to offload heel. We also applied an allyven dressing to plantar foot to reduce friction and pressure and he can try to use PRAFO boot. His heel does not appear to be in contact with any solid surface on his wheelchair. Other wounds are unchanged. Due to worsening left heel wound, will bring back to clinic next week for evaluation.    4/28/2023: Clinic visit with Steve Hodges MD. Patient reports feeling well, denies any fevers or chills. He has been using pillow under leg to completely offload heel at night and both heel / plantar foot wound improved. He reports with the nice weather he has been spending majority of his day up in chair outside. Unfortunately his sacral wound larger and bone palpable. He is unsure if he would want to be evaluated again by surgery for possible flap.    5/5/2023: Clinic visit with Steve Hodges MD. Patient denies any complaints today. He reports that he has spent less time in wheelchair in effort to improve offloading. Patients lower extremity wounds are fairly stable. Sacral wound appears stable with small partial thickness linear wound superior. He does not recall any specific injury.    5/12/2023 : Clinic visit with ADILSON Arthur, HOLDEN, IMAN, LILIYA.   Anjum continues to feel well overall.  His wounds continue to wax and wane.  He states that his low-air-loss bed was malfunctioning, was bottoming out, has since been repaired.  He continues to spend much of his day up in his wheelchair.    5/19/2023 : Clinic visit with ADILSON Arthur, HOLDEN, IMAN, LILIYA.   Anjum states he is feeling very well today.  Sacral wound about the same, lateral leg wound  continues to wax and wane.  Heel wound is improving steadily.  Plantar foot wound slowly progressing.    5/26/2023 : Clinic visit with ADILSON Arthur, HOLDEN, LILIYA VALERIO.   Continues to feel well.  His heel ulcer is healed, though covered with fragile epithelium.  Medial leg wound has deteriorated slightly.  Sacral and plantar foot wounds are both improved.    6/2/2023 : Clinic visit with ADILSON Arthur, HOLDEN, LILIYA VALERIO.   Anjum informed us prior to today's visit that he tested positive for COVID earlier this week.  His symptoms have resolved, and he is wearing an N95 mask today, as am I and the wound nurse.  He states that today he is feeling well, denies fevers, chills, nausea, vomiting, cough or shortness of breath.    Unfortunately, his left medial lower leg wound has deteriorated significantly.  This has happened before, underlying osteomyelitis is suspected, however he does not wish to undergo amputation, nor does his surgeon wish to do so.  Tissue quality of this wound has been poor for some time.   His sacral wound is slowly progressing.  Plantar foot wound is also slowly getting better.   His left heel ulcer, previously resolved, presents today with discoloration.  Suspect DTI.  Will need to be monitored    6/9/2023: Clinic visit with Steve Hodges MD. Anjum reports feeling better, has completely recovered from COVID and denies any complaints. His left leg wounds worsening with new left posterior leg wound, possibly related to the suspected OM. His plantar foot wound with fascia or tendon exposed in base. He does not want any surgical intervention such as amputation. Sacral wound is stable.    6/16/2023 : Clinic visit with ADILSON Arthur, HOLDEN, GEETHA VALERIO   Anjum states he is feeling very well.  His posterior leg wounds have again healed, though covered with thin epithelium.  Medial leg wound and plantar foot wound have both improved.  Sacral wound about the same.      REVIEW OF SYSTEMS:    Unchanged from previous wound clinic assessment on 6/9/2023, except as noted in interval history above     PHYSICAL EXAMINATION:   /87   Pulse (!) 57   Temp 36.2 °C (97.1 °F)   Resp 18   SpO2 95%   Physical Exam  Constitutional:       Appearance: He is obese.   Cardiovascular:      Rate and Rhythm: Normal rate.   Pulmonary:      Effort: Pulmonary effort is normal.   Abdominal:      Comments: Colostomy left lower quadrant   Genitourinary:     Comments: Suprapubic catheter  Skin:     Comments: Stage IV coccygeal pressure injury -wound area about the same, tissue quality improving, slough to wound bed, moderate serosanguineous drainage, no evidence of infection    Full-thickness wound to left medial lower leg -wound area has decreased, tissue quality remains poor, moderate serosanguineous drainage, no evidence of infection  Full thickness wound to left posterior lower leg -nearly resolved, though periwound remains darkened.  No drainage.  No evidence of infection    Stage III pressure injury left posterior heel -covered with fragile epithelium, small pinpoint openings within wound bed, though no appreciable drainage.  No evidence of infection    Stage IV pressure injury plantar foot -wound area is decreased significantly since last assessment, minimal drainage, wound bed without slough, no evidence of infection.      Refer to wound flowsheet and photos   Neurological:      Mental Status: He is alert and oriented to person, place, and time.   Psychiatric:         Mood and Affect: Mood normal.         WOUND ASSESSMENT  Wound 02/27/23 Pressure Injury Coccyx Stage IV (Active)   Wound Image    06/16/23 1400   Site Assessment Red;Yellow 06/16/23 1400   Periwound Assessment Fragile;Scar tissue 06/16/23 1400   Margins Unattached edges 06/16/23 1400   Drainage Amount Large 06/16/23 1400   Drainage Description Serosanguineous 06/16/23 1400   Treatments Cleansed;Provider debridement;Site care 06/16/23 1400   Wound  Cleansing Puracyn [place] 06/16/23 1400   Periwound Protectant Barrier Paste 06/16/23 1400   Dressing Cleansing/Solutions Normal Saline 06/16/23 1400   Dressing Options Collagen Dressing;Hydrofiber Silver Strip;Hydrofiber Silver;Other (Comments) 06/16/23 1400   Dressing Changed Changed 06/16/23 1400   Dressing Change/Treatment Frequency Monday, Wednesday, Friday, and As Needed 03/31/23 1527   WOUND NURSE ONLY - Pressure Injury Stage 4 06/16/23 1400   Wound Length (cm) 3.5 cm 06/16/23 1400   Wound Width (cm) 1.7 cm 06/16/23 1400   Wound Depth (cm) 1.1 cm 06/16/23 1400   Wound Surface Area (cm^2) 5.95 cm^2 06/16/23 1400   Wound Volume (cm^3) 6.545 cm^3 06/16/23 1400   Post-Procedure Length (cm) 3.6 cm 06/16/23 1400   Post-Procedure Width (cm) 1.7 cm 06/16/23 1400   Post-Procedure Depth (cm) 1.1 cm 06/16/23 1400   Post-Procedure Surface Area (cm^2) 6.12 cm^2 06/16/23 1400   Post-Procedure Volume (cm^3) 6.732 cm^3 06/16/23 1400   Wound Healing % -80 06/16/23 1400   Tunneling (cm) 0 cm 06/16/23 1400   Tunneling Clock Position of Wound 12 05/19/23 1400   Undermining (cm) 2.3 cm 06/16/23 1400   Undermining of Wound, 1st Location From 10 o'clock;To 2 o'clock 06/16/23 1400   Undermining (cm) - 2nd location 0.6 cm 06/02/23 1600   Undermining of Wound, 2nd Location From 10 o'clock;To 11 o'clock 06/02/23 1600   Wound Odor None 06/16/23 1400   Exposed Structures Bone 06/16/23 1400   Number of days: 109       Wound 02/27/23 Heel Posterior Left (Active)   Wound Image   06/16/23 1400   Site Assessment Fragile;Epithelialization 06/16/23 1400   Periwound Assessment Blanchable erythema;Fragile;Scar tissue 06/16/23 1400   Margins Attached edges 06/09/23 1400   Drainage Amount None 06/16/23 1400   Drainage Description Serosanguineous 05/19/23 1400   Treatments Cleansed 06/16/23 1400   Wound Cleansing Foam Cleanser/Washcloth 06/16/23 1400   Periwound Protectant Barrier Paste 06/16/23 1400   Dressing Cleansing/Solutions Not Applicable  06/16/23 1400   Dressing Options Other (Comments);Tubigrip 06/16/23 1400   Dressing Changed New 06/16/23 1400   Dressing Change/Treatment Frequency Monday, Wednesday, Friday, and As Needed 06/16/23 1400   WOUND NURSE ONLY - Pressure Injury Stage 3 06/09/23 1400   Wound Length (cm) 3.8 cm 06/09/23 1400   Wound Width (cm) 1 cm 06/09/23 1400   Wound Depth (cm) 0.1 cm 06/09/23 1400   Wound Surface Area (cm^2) 3.8 cm^2 06/09/23 1400   Wound Volume (cm^3) 0.38 cm^3 06/09/23 1400   Post-Procedure Length (cm) 0.7 cm 05/19/23 1400   Post-Procedure Width (cm) 0.3 cm 05/19/23 1400   Post-Procedure Depth (cm) 0 cm 05/19/23 1400   Post-Procedure Surface Area (cm^2) 0.21 cm^2 05/19/23 1400   Post-Procedure Volume (cm^3) 0 cm^3 05/19/23 1400   Wound Healing % 62 06/09/23 1400   Tunneling (cm) 0 cm 06/16/23 1400   Undermining (cm) 0 cm 06/16/23 1400   Wound Odor None 06/16/23 1400   Exposed Structures None 06/16/23 1400   Number of days: 109       Wound 03/03/23 Full Thickness Wound Leg LLE medial (Active)   Wound Image   06/16/23 1400   Site Assessment Red;Purple;Boggy 06/16/23 1400   Periwound Assessment Scar tissue;Fragile;Hyperpigmented 06/16/23 1400   Margins Unattached edges 06/16/23 1400   Drainage Amount Moderate 06/16/23 1400   Drainage Description Serosanguineous 06/16/23 1400   Treatments Cleansed;Provider debridement;Site care 06/16/23 1400   Wound Cleansing Puracyn [place] 06/16/23 1400   Periwound Protectant Skin Protectant Wipes to Periwound;Barrier Paste 06/16/23 1400   Dressing Cleansing/Solutions Not Applicable 06/16/23 1400   Dressing Options Hydrofiber Silver;Other (Comments);Tubigrip 06/16/23 1400   Dressing Changed Changed 06/16/23 1400   Dressing Change/Treatment Frequency Monday, Wednesday, Friday, and As Needed 03/31/23 1527   Non-staged Wound Description Full thickness 06/16/23 1400   Wound Length (cm) 2 cm 06/16/23 1400   Wound Width (cm) 3 cm 06/16/23 1400   Wound Depth (cm) 0.8 cm 06/16/23 1400   Wound  Surface Area (cm^2) 6 cm^2 06/16/23 1400   Wound Volume (cm^3) 4.8 cm^3 06/16/23 1400   Post-Procedure Length (cm) 2.1 cm 06/16/23 1400   Post-Procedure Width (cm) 3.1 cm 06/16/23 1400   Post-Procedure Depth (cm) 0.8 cm 06/16/23 1400   Post-Procedure Surface Area (cm^2) 6.51 cm^2 06/16/23 1400   Post-Procedure Volume (cm^3) 5.208 cm^3 06/16/23 1400   Wound Healing % -1500 06/16/23 1400   Tunneling (cm) 0 cm 06/16/23 1400   Undermining (cm) 0 cm 06/16/23 1400   Undermining of Wound, 1st Location From 1 o'clock;To 2 o'clock 06/02/23 1600   Undermining (cm) - 2nd location 1.3 cm 06/02/23 1600   Undermining of Wound, 2nd Location From 3 o'clock;To 4 o'clock 06/02/23 1600   Wound Odor None 06/16/23 1400   Exposed Structures None 06/16/23 1400   Number of days: 105       Wound 03/31/23 Pressure Injury Foot Plantar;Distal Left (Active)   Wound Image   06/16/23 1400   Site Assessment Red 06/16/23 1400   Periwound Assessment Scar tissue;Callused 06/16/23 1400   Margins Unattached edges 06/16/23 1400   Drainage Amount Moderate 06/16/23 1400   Drainage Description Serosanguineous 06/16/23 1400   Treatments Cleansed;Site care 06/16/23 1400   Wound Cleansing Puracyn [place] 06/16/23 1400   Periwound Protectant Barrier Paste 06/16/23 1400   Dressing Cleansing/Solutions Normal Saline 06/16/23 1400   Dressing Options Collagen Dressing;Hydrofiber Silver;Silicone Adhesive Foam;Tubigrip 06/16/23 1400   Dressing Changed Changed 06/16/23 1400   WOUND NURSE ONLY - Pressure Injury Stage 4 06/16/23 1400   Wound Length (cm) 0.3 cm 06/16/23 1400   Wound Width (cm) 0.3 cm 06/16/23 1400   Wound Depth (cm) 0.4 cm 06/16/23 1400   Wound Surface Area (cm^2) 0.09 cm^2 06/16/23 1400   Wound Volume (cm^3) 0.036 cm^3 06/16/23 1400   Post-Procedure Length (cm) 0.9 cm 06/09/23 1400   Post-Procedure Width (cm) 1.1 cm 06/09/23 1400   Post-Procedure Depth (cm) 0.6 cm 06/09/23 1400   Post-Procedure Surface Area (cm^2) 0.99 cm^2 06/09/23 1400   Post-Procedure  Volume (cm^3) 0.594 cm^3 06/09/23 1400   Wound Healing % 33 06/16/23 1400   Tunneling (cm) 0 cm 06/16/23 1400   Undermining (cm) 0 cm 06/16/23 1400   Wound Odor None 06/16/23 1400   Exposed Structures None 06/16/23 1400   Number of days: 77       Wound 06/09/23 Full Thickness Wound Leg Posterior Left (Active)   Wound Image   06/16/23 1400   Site Assessment Red 06/16/23 1400   Periwound Assessment Fragile;Hyperpigmented;Other (Comment) 06/16/23 1400   Margins Attached edges 06/16/23 1400   Drainage Amount Small 06/16/23 1400   Drainage Description Serosanguineous 06/16/23 1400   Treatments Cleansed;Site care 06/16/23 1400   Wound Cleansing Puracyn [place] 06/16/23 1400   Periwound Protectant Barrier Paste 06/16/23 1400   Dressing Cleansing/Solutions Not Applicable 06/16/23 1400   Dressing Options Hydrofiber Silver;Other (Comments);Tubigrip 06/16/23 1400   Dressing Changed Changed 06/16/23 1400   Dressing Change/Treatment Frequency Every 72 hrs, and As Needed 06/16/23 1400   Non-staged Wound Description Full thickness 06/16/23 1400   Wound Length (cm) 1.8 cm 06/16/23 1400   Wound Width (cm) 0.8 cm 06/16/23 1400   Wound Depth (cm) 0.1 cm 06/16/23 1400   Wound Surface Area (cm^2) 1.44 cm^2 06/16/23 1400   Wound Volume (cm^3) 0.144 cm^3 06/16/23 1400   Post-Procedure Length (cm) 2 cm 06/09/23 1400   Post-Procedure Width (cm) 2.8 cm 06/09/23 1400   Post-Procedure Depth (cm) 0.2 cm 06/09/23 1400   Post-Procedure Surface Area (cm^2) 5.6 cm^2 06/09/23 1400   Post-Procedure Volume (cm^3) 1.12 cm^3 06/09/23 1400   Wound Healing % 86 06/16/23 1400   Tunneling (cm) 0 cm 06/16/23 1400   Undermining (cm) 0 cm 06/16/23 1400   Wound Odor None 06/16/23 1400   Exposed Structures None 06/16/23 1400   Number of days: 7       PROCEDURE: Excisional debridement of sacral wound and left plantar pressure injury stage IV  -2% viscous lidocaine applied topically to wound bed for approximately 5 minutes prior to debridement  -Curette used to  debride wound bed.  Excisional debridement was performed to remove devitalized tissue until healthy, bleeding tissue was visualized.   Entire surface of wound, 6.12 cm² debrided.  Tissue debrided into the muscle / fascia layer.    -Bleeding controlled with manual pressure.    -Wound care completed by wound RN, refer to flowsheet  -Patient tolerated the procedure well, without c/o pain or discomfort.      PROCEDURE: Excisional debridement of left medial lower leg wound  -2% viscous lidocaine applied topically to wound bed for approximately 5 minutes prior to debridement  -Curette used to debride wound bed.  Excisional debridement was performed to remove devitalized tissue until healthy, bleeding tissue was visualized.   Entire surface of wound, 6.51 cm² debrided.  Tissue debrided into the subcutaneous layer.    -Bleeding controlled with manual pressure.    -Wound care completed by wound RN, refer to flowsheet  -Patient tolerated the procedure well, without c/o pain or discomfort.        BIOLOGIC LOG  5/20/2022-first application.  PRODUCT: EpiCord 2 x 3 cm . PRODUCT #[id number]; EXPIRES: 2026-12-01 5/27/2022- second application.  PRODUCT: EpiCordEX 2 x 3 cm . PRODUCT #[id number]; EXPIRES: 2026-09-01    6/3/2022- third application.  PRODUCT: EpiCordEX 2 x 3 cm . PRODUCT #[id number]; EXPIRES: 2026-10-01    6/10/2022- fourth application.  PRODUCT: EpiCordEX 2 x 3 cm . PRODUCT #[id number]; EXPIRES: 2026-09-01 6/17/2022- fifth application.  PRODUCT: EpiCordEX 2 x 3 cm . PRODUCT #[id number]; EXPIRES: 2027-02-01 6/24/2022- sixth application.  PRODUCT: EpiCordEX 2 x 3 cm . PRODUCT #[id number]; EXPIRES: 2027-02-01 07/01/22: Seventh application.  Product: Epicort Dx 2.0 x 3.0 cm reorder number [id number]; product number [id number]; expires 2027-02-01 7/22/2022- eighth application.  PRODUCT: EpiCord 2 x 3 cm, reorder #[id number]. PRODUCT #[id number]; EXPIRES:  2027-02-01      Pertinent Labs and Diagnostics:    Labs:     A1c: No results found for: HBA1C     [facility] results, 7/1/2022 (under media tab)    CRP 13    ESR 31      IMAGING:     10/3/2022-CT of pelvis with contrast  IMPRESSION:     1.  Posterior soft tissue sacral wound and 1.5 x 3.7 cm abscess.   2.  Progressive destruction of the distal sacrum and proximal coccyx. Sclerosis and irregularity of the distal sacrum consistent with osteomyelitis.   3.  Old wound within the soft tissues posterior to the distal left SI joint with possible fistulous communication.    10/3/2022-CT of tib-fib with and without contrast, with reconstruction  IMPRESSION:     1.  Old fractures of the left tibia and fibula with extensive callus formation and bony bridging as well as extensive dystrophic calcifications. Similar to previous.   2.  Distal medial soft tissue wounds with ill-defined superficial in the soft tissue thickening and fluid collections suggestive of phlegmon. No organized abscess identified.   3.  No definite osteomyelitis.   4.  Arthritic changes.        VASCULAR STUDIES: No results found.    LAST  WOUND CULTURE:   Lab Results   Component Value Date/Time    CULTRSULT Mixed enteric ade >100,000 cfu/mL 04/15/2023 04:37 PM      PATHOLOGY  2/17/2023-bone fragment extracted from left lower extremity wound\\  FINAL DIAGNOSIS:     A. Left leg bone fragment at base of chronic wound:          Extensively degenerated fibrocartilaginous tissue with a rim of           fibrinopurulent debris          Correlate with culture findings            Comment: While no residual intact bone is identified, these           findings are suggestive of adjacent osteomyelitis.      ASSESSMENT AND PLAN:     1. Sacral decubitus ulcer, stage IV ([facility])  Comments: Ulcer first noted in early April 2022 as small open area which quickly enlarged.  This ulcer is present distal from previous sacral ulcer which healed after surgery in January.  Patient has  history of flap reconstruction x2 to this area.  CT shows progressive destruction of distal sacrum and proximal coccyx.    6/16/2023: Wound area measures larger, however this may be positional.  Quality of tissue continues to improve.  No evidence of infection  -Excisional debridement of wound in clinic today, medically necessary to promote wound healing.  -Home health to change dressing 1-2 times per week in between clinic visits  -Patient does spend a lot of time up in his wheelchair, and feels this is necessary for his quality of life  - Patient does not currently have follow up with Dr. Saini. If wound deteriorates or fails to improve can consider re-establishing with Dr. Saini for evaluation for coverage options. Patient does not want to pursue at this time and is happy with current conservative approach even with wound worsening.  -Roho cushion in wheelchair, appears to be in good condition.  Patient is well versed on offloading techniques    Wound care: Collagen, Hydrofiber silver strip, hydrofiber silver, Mepilex    2. Postoperative wound dehiscence, subsequent encounter  3. Osteomyelitis of left lower extremity  Comments: On 4/26/2022 patient underwent irrigation and debridement of multiple compartments of the left lower extremity states for pressure injury, with bone excision, and complex closure of chronic wound using biologic skin substitute.  During postop visit in surgeons office on 5/11, surgical site was noted to be dehisced.  Patient was referred to wound clinic for management.    6/16/2023: Wound area has decreased since last assessment.  Periwound remains darkened and slightly boggy.  -Excisional debridement of wound in clinic today, medically necessary to promote wound healing.  -Patient to return to clinic weekly for assessment and debridement  -Home health to change dressing 1-2 times per week in between clinic visits OM.  -Suspect underlying OM, however patient does not wish to consider  surgical options at this time nor does his surgeon wish to do any further surgery to this leg. 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 There is no gross soft tissue infection and repeated Abx treatment without source control is not indicated.  -Patient to be mindful of offloading when supine, should assure that his leg is not rotating medially  Wound care: Hydrofiber silver, silicone adhesive foam, tubigrip    3. Deep tissue injury  4. Pressure injury of left foot, stage IV  Comments: DTI to posterior left lower leg first observed in clinic 7/29/2022.  Patient does not know how it started, had been wearing heel float boot consistently.  Evolved into stage IV pressure injury    6/16/2023: Area of plantar foot wound has decreased significantly since last assessment, nearly resolved  -No need for debridement of this wound today  -Patient to return to clinic weekly for assessment and debridement  -Home health to change dressing 1-2 times per week in between clinic visits     Wound care: Hydrofiber silver, Mepilex, Tubigrip D    5. Pressure injury of left heel, stage 3 ([facility])  Comments: First noted in clinic on 10/21/2022, originally noted to be stage II    6/16/2023: Wound is essentially resolved, though covered with fragile epithelium.  There are scattered areas of small pinpoint openings, with scant drainage  -Monitor each clinic visit, observe for deterioration  - Patient continue wearing heel float boots at all times  - Heel does not appear to contact any solid surfaces while in wheelchair   Wound Care: Hydrofiber Silver, Heel Mepilex to reduce pressure and friction    6. Quadriplegia, C5-C7, incomplete ([facility])  Comments: Complicating factor.  Impaired mobility and sensation  -Patient is still spending 7-8 hours/day up in his wheelchair, though he does have appropriate offloading cushion, and knows to reposition frequently.  Wears heel float boots bilaterally at all times        Please  note that this note may have been created using voice recognition software. I have worked with technical experts from [facility] to optimize the interface.  I have made every reasonable attempt to correct obvious errors, but there may be errors of grammar and possibly content that I did not discover before finalizing the note.

## 2023-06-16 NOTE — PATIENT INSTRUCTIONS
Should you experience any significant changes in your wound(s) such as infection (redness, swelling, localized heat, increased pain, fever >101 F, chills) or have any questions regarding your home care instructions, please contact the wound center [phone]. If after hours, contact your primary care physician or go the hospital emergency room.  Keep dressing clean and dry and cover while bathing. Only change dressing if over saturated, soiled or its falling off.

## 2023-06-17 ENCOUNTER — HOSPITAL ENCOUNTER (INPATIENT)
Facility: MEDICAL CENTER | Age: 73
LOS: 3 days | DRG: 698 | End: 2023-06-20
Attending: EMERGENCY MEDICINE | Admitting: HOSPITALIST
Payer: MEDICARE

## 2023-06-17 ENCOUNTER — APPOINTMENT (OUTPATIENT)
Dept: RADIOLOGY | Facility: MEDICAL CENTER | Age: 73
DRG: 698 | End: 2023-06-17
Attending: EMERGENCY MEDICINE
Payer: MEDICARE

## 2023-06-17 DIAGNOSIS — A41.9 SEPSIS WITHOUT ACUTE ORGAN DYSFUNCTION, DUE TO UNSPECIFIED ORGANISM (HCC): ICD-10-CM

## 2023-06-17 DIAGNOSIS — N30.00 ACUTE CYSTITIS WITHOUT HEMATURIA: ICD-10-CM

## 2023-06-17 DIAGNOSIS — L89.524 PRESSURE ULCER OF LEFT ANKLE, STAGE 4 (HCC): ICD-10-CM

## 2023-06-17 DIAGNOSIS — L98.424 SKIN ULCER OF SACRUM WITH NECROSIS OF BONE (HCC): ICD-10-CM

## 2023-06-17 DIAGNOSIS — L89.894 PRESSURE INJURY OF LEFT FOOT, STAGE 4 (HCC): ICD-10-CM

## 2023-06-17 DIAGNOSIS — G82.53 QUADRIPLEGIA, C5-C7 COMPLETE (HCC): Chronic | ICD-10-CM

## 2023-06-17 PROBLEM — N39.0 SEPSIS SECONDARY TO UTI (HCC): Status: ACTIVE | Noted: 2023-06-17

## 2023-06-17 LAB
ALBUMIN SERPL BCP-MCNC: 3.6 G/DL (ref 3.2–4.9)
ALBUMIN/GLOB SERPL: 1 G/DL
ALP SERPL-CCNC: 78 U/L (ref 30–99)
ALT SERPL-CCNC: <5 U/L (ref 2–50)
ANION GAP SERPL CALC-SCNC: 9 MMOL/L (ref 7–16)
APPEARANCE UR: ABNORMAL
AST SERPL-CCNC: 10 U/L (ref 12–45)
BACTERIA #/AREA URNS HPF: ABNORMAL /HPF
BASOPHILS # BLD AUTO: 0.2 % (ref 0–1.8)
BASOPHILS # BLD: 0.02 K/UL (ref 0–0.12)
BILIRUB SERPL-MCNC: 0.6 MG/DL (ref 0.1–1.5)
BILIRUB UR QL STRIP.AUTO: NEGATIVE
BUN SERPL-MCNC: 13 MG/DL (ref 8–22)
CALCIUM ALBUM COR SERPL-MCNC: 9.3 MG/DL (ref 8.5–10.5)
CALCIUM SERPL-MCNC: 9 MG/DL (ref 8.4–10.2)
CHLORIDE SERPL-SCNC: 105 MMOL/L (ref 96–112)
CO2 SERPL-SCNC: 22 MMOL/L (ref 20–33)
COLOR UR: YELLOW
CREAT SERPL-MCNC: 0.8 MG/DL (ref 0.5–1.4)
EOSINOPHIL # BLD AUTO: 0.1 K/UL (ref 0–0.51)
EOSINOPHIL NFR BLD: 1 % (ref 0–6.9)
EPI CELLS #/AREA URNS HPF: ABNORMAL /HPF
ERYTHROCYTE [DISTWIDTH] IN BLOOD BY AUTOMATED COUNT: 57 FL (ref 35.9–50)
FLUAV RNA SPEC QL NAA+PROBE: NEGATIVE
FLUBV RNA SPEC QL NAA+PROBE: NEGATIVE
GFR SERPLBLD CREATININE-BSD FMLA CKD-EPI: 93 ML/MIN/1.73 M 2
GLOBULIN SER CALC-MCNC: 3.7 G/DL (ref 1.9–3.5)
GLUCOSE SERPL-MCNC: 133 MG/DL (ref 65–99)
GLUCOSE UR STRIP.AUTO-MCNC: NEGATIVE MG/DL
HCT VFR BLD AUTO: 40.1 % (ref 42–52)
HGB BLD-MCNC: 13.1 G/DL (ref 14–18)
IMM GRANULOCYTES # BLD AUTO: 0.05 K/UL (ref 0–0.11)
IMM GRANULOCYTES NFR BLD AUTO: 0.5 % (ref 0–0.9)
INR PPP: 1.25 (ref 0.87–1.13)
KETONES UR STRIP.AUTO-MCNC: ABNORMAL MG/DL
LACTATE SERPL-SCNC: 1.4 MMOL/L (ref 0.5–2)
LEUKOCYTE ESTERASE UR QL STRIP.AUTO: ABNORMAL
LYMPHOCYTES # BLD AUTO: 0.34 K/UL (ref 1–4.8)
LYMPHOCYTES NFR BLD: 3.4 % (ref 22–41)
MCH RBC QN AUTO: 33.9 PG (ref 27–33)
MCHC RBC AUTO-ENTMCNC: 32.7 G/DL (ref 32.3–36.5)
MCV RBC AUTO: 103.9 FL (ref 81.4–97.8)
MICRO URNS: ABNORMAL
MONOCYTES # BLD AUTO: 0.77 K/UL (ref 0–0.85)
MONOCYTES NFR BLD AUTO: 7.7 % (ref 0–13.4)
MUCOUS THREADS #/AREA URNS HPF: ABNORMAL /HPF
NEUTROPHILS # BLD AUTO: 8.78 K/UL (ref 1.82–7.42)
NEUTROPHILS NFR BLD: 87.2 % (ref 44–72)
NITRITE UR QL STRIP.AUTO: POSITIVE
NRBC # BLD AUTO: 0 K/UL
NRBC BLD-RTO: 0 /100 WBC (ref 0–0.2)
PH UR STRIP.AUTO: 6.5 [PH] (ref 5–8)
PLATELET # BLD AUTO: 137 K/UL (ref 164–446)
PMV BLD AUTO: 9 FL (ref 9–12.9)
POTASSIUM SERPL-SCNC: 3.7 MMOL/L (ref 3.6–5.5)
PROT SERPL-MCNC: 7.3 G/DL (ref 6–8.2)
PROT UR QL STRIP: NEGATIVE MG/DL
PROTHROMBIN TIME: 15.5 SEC (ref 12–14.6)
RBC # BLD AUTO: 3.86 M/UL (ref 4.7–6.1)
RBC # URNS HPF: ABNORMAL /HPF
RBC UR QL AUTO: ABNORMAL
RSV RNA SPEC QL NAA+PROBE: NEGATIVE
SARS-COV-2 RNA RESP QL NAA+PROBE: DETECTED
SODIUM SERPL-SCNC: 136 MMOL/L (ref 135–145)
SP GR UR STRIP.AUTO: 1.02
SPECIMEN SOURCE: ABNORMAL
WBC # BLD AUTO: 10.1 K/UL (ref 4.8–10.8)
WBC #/AREA URNS HPF: ABNORMAL /HPF
YEAST BUDDING URNS QL: PRESENT /HPF
YEAST HYPHAE #/AREA URNS HPF: PRESENT /HPF

## 2023-06-17 PROCEDURE — 85025 COMPLETE CBC W/AUTO DIFF WBC: CPT

## 2023-06-17 PROCEDURE — 83605 ASSAY OF LACTIC ACID: CPT

## 2023-06-17 PROCEDURE — 770001 HCHG ROOM/CARE - MED/SURG/GYN PRIV*

## 2023-06-17 PROCEDURE — 0241U HCHG SARS-COV-2 COVID-19 NFCT DS RESP RNA 4 TRGT MIC: CPT

## 2023-06-17 PROCEDURE — C9803 HOPD COVID-19 SPEC COLLECT: HCPCS | Performed by: EMERGENCY MEDICINE

## 2023-06-17 PROCEDURE — 87040 BLOOD CULTURE FOR BACTERIA: CPT

## 2023-06-17 PROCEDURE — 700102 HCHG RX REV CODE 250 W/ 637 OVERRIDE(OP): Performed by: EMERGENCY MEDICINE

## 2023-06-17 PROCEDURE — 80053 COMPREHEN METABOLIC PANEL: CPT

## 2023-06-17 PROCEDURE — 700111 HCHG RX REV CODE 636 W/ 250 OVERRIDE (IP): Performed by: EMERGENCY MEDICINE

## 2023-06-17 PROCEDURE — 700101 HCHG RX REV CODE 250: Performed by: EMERGENCY MEDICINE

## 2023-06-17 PROCEDURE — 36415 COLL VENOUS BLD VENIPUNCTURE: CPT

## 2023-06-17 PROCEDURE — 87086 URINE CULTURE/COLONY COUNT: CPT

## 2023-06-17 PROCEDURE — 71045 X-RAY EXAM CHEST 1 VIEW: CPT

## 2023-06-17 PROCEDURE — 85610 PROTHROMBIN TIME: CPT

## 2023-06-17 PROCEDURE — A9270 NON-COVERED ITEM OR SERVICE: HCPCS | Performed by: EMERGENCY MEDICINE

## 2023-06-17 PROCEDURE — 99285 EMERGENCY DEPT VISIT HI MDM: CPT

## 2023-06-17 PROCEDURE — 700105 HCHG RX REV CODE 258: Performed by: EMERGENCY MEDICINE

## 2023-06-17 PROCEDURE — 96365 THER/PROPH/DIAG IV INF INIT: CPT

## 2023-06-17 PROCEDURE — 81001 URINALYSIS AUTO W/SCOPE: CPT

## 2023-06-17 PROCEDURE — 99222 1ST HOSP IP/OBS MODERATE 55: CPT | Mod: AI | Performed by: HOSPITALIST

## 2023-06-17 RX ORDER — BISACODYL 10 MG
10 SUPPOSITORY, RECTAL RECTAL
Status: DISCONTINUED | OUTPATIENT
Start: 2023-06-17 | End: 2023-06-20 | Stop reason: HOSPADM

## 2023-06-17 RX ORDER — POLYETHYLENE GLYCOL 3350 17 G/17G
1 POWDER, FOR SOLUTION ORAL
Status: DISCONTINUED | OUTPATIENT
Start: 2023-06-17 | End: 2023-06-20 | Stop reason: HOSPADM

## 2023-06-17 RX ORDER — GRANULES FOR ORAL 3 G/1
3 POWDER ORAL ONCE
Status: DISCONTINUED | OUTPATIENT
Start: 2023-06-17 | End: 2023-06-18

## 2023-06-17 RX ORDER — FLUCONAZOLE 100 MG/1
150 TABLET ORAL ONCE
Status: COMPLETED | OUTPATIENT
Start: 2023-06-17 | End: 2023-06-17

## 2023-06-17 RX ORDER — LOSARTAN POTASSIUM 50 MG/1
50 TABLET ORAL
Status: DISCONTINUED | OUTPATIENT
Start: 2023-06-17 | End: 2023-06-20 | Stop reason: HOSPADM

## 2023-06-17 RX ORDER — AMLODIPINE BESYLATE 5 MG/1
10 TABLET ORAL EVERY EVENING
Status: DISCONTINUED | OUTPATIENT
Start: 2023-06-18 | End: 2023-06-20 | Stop reason: HOSPADM

## 2023-06-17 RX ORDER — AMOXICILLIN 250 MG
2 CAPSULE ORAL 2 TIMES DAILY
Status: DISCONTINUED | OUTPATIENT
Start: 2023-06-18 | End: 2023-06-20 | Stop reason: HOSPADM

## 2023-06-17 RX ORDER — BACLOFEN 10 MG/1
20 TABLET ORAL 4 TIMES DAILY
Status: DISCONTINUED | OUTPATIENT
Start: 2023-06-18 | End: 2023-06-20 | Stop reason: HOSPADM

## 2023-06-17 RX ORDER — ASPIRIN 81 MG/1
81 TABLET ORAL DAILY
Status: DISCONTINUED | OUTPATIENT
Start: 2023-06-18 | End: 2023-06-20 | Stop reason: HOSPADM

## 2023-06-17 RX ORDER — ACETAMINOPHEN 325 MG/1
650 TABLET ORAL EVERY 6 HOURS PRN
Status: DISCONTINUED | OUTPATIENT
Start: 2023-06-17 | End: 2023-06-20 | Stop reason: HOSPADM

## 2023-06-17 RX ORDER — ACETAMINOPHEN 500 MG
1000 TABLET ORAL ONCE
Status: COMPLETED | OUTPATIENT
Start: 2023-06-17 | End: 2023-06-17

## 2023-06-17 RX ORDER — SODIUM CHLORIDE, SODIUM LACTATE, POTASSIUM CHLORIDE, AND CALCIUM CHLORIDE .6; .31; .03; .02 G/100ML; G/100ML; G/100ML; G/100ML
30 INJECTION, SOLUTION INTRAVENOUS ONCE
Status: COMPLETED | OUTPATIENT
Start: 2023-06-17 | End: 2023-06-17

## 2023-06-17 RX ORDER — ONDANSETRON 2 MG/ML
4 INJECTION INTRAMUSCULAR; INTRAVENOUS EVERY 4 HOURS PRN
Status: DISCONTINUED | OUTPATIENT
Start: 2023-06-17 | End: 2023-06-20 | Stop reason: HOSPADM

## 2023-06-17 RX ORDER — ONDANSETRON 4 MG/1
4 TABLET, ORALLY DISINTEGRATING ORAL EVERY 4 HOURS PRN
Status: DISCONTINUED | OUTPATIENT
Start: 2023-06-17 | End: 2023-06-20 | Stop reason: HOSPADM

## 2023-06-17 RX ORDER — SODIUM CHLORIDE, SODIUM LACTATE, POTASSIUM CHLORIDE, AND CALCIUM CHLORIDE .6; .31; .03; .02 G/100ML; G/100ML; G/100ML; G/100ML
500 INJECTION, SOLUTION INTRAVENOUS
Status: DISCONTINUED | OUTPATIENT
Start: 2023-06-17 | End: 2023-06-20 | Stop reason: HOSPADM

## 2023-06-17 RX ADMIN — ACETAMINOPHEN 1000 MG: 500 TABLET ORAL at 21:55

## 2023-06-17 RX ADMIN — FLUCONAZOLE 150 MG: 100 TABLET ORAL at 23:23

## 2023-06-17 RX ADMIN — MEROPENEM 500 MG: 500 INJECTION, POWDER, FOR SOLUTION INTRAVENOUS at 23:23

## 2023-06-17 RX ADMIN — SODIUM CHLORIDE, POTASSIUM CHLORIDE, SODIUM LACTATE AND CALCIUM CHLORIDE 2190 ML: 600; 310; 30; 20 INJECTION, SOLUTION INTRAVENOUS at 21:55

## 2023-06-17 ASSESSMENT — FIBROSIS 4 INDEX: FIB4 SCORE: 2.41

## 2023-06-17 ASSESSMENT — ENCOUNTER SYMPTOMS
DEPRESSION: 0
HEADACHES: 0
INSOMNIA: 0
COUGH: 0
NECK PAIN: 0
MYALGIAS: 0
DOUBLE VISION: 0
VOMITING: 0
SORE THROAT: 0
PALPITATIONS: 0
SHORTNESS OF BREATH: 0
BLURRED VISION: 0
NAUSEA: 0
DIZZINESS: 0
BRUISES/BLEEDS EASILY: 0
WEAKNESS: 0

## 2023-06-18 ENCOUNTER — NON-PROVIDER VISIT (OUTPATIENT)
Dept: CARDIOLOGY | Facility: MEDICAL CENTER | Age: 73
End: 2023-06-18
Payer: MEDICARE

## 2023-06-18 PROBLEM — N39.0 ACUTE UTI: Status: ACTIVE | Noted: 2023-06-18

## 2023-06-18 PROBLEM — E87.20 METABOLIC ACIDOSIS: Status: ACTIVE | Noted: 2023-06-18

## 2023-06-18 PROBLEM — D69.6 THROMBOCYTOPENIA (HCC): Status: ACTIVE | Noted: 2023-06-18

## 2023-06-18 PROBLEM — Z71.89 ADVANCE CARE PLANNING: Status: ACTIVE | Noted: 2023-06-18

## 2023-06-18 PROBLEM — E87.6 HYPOKALEMIA: Status: ACTIVE | Noted: 2023-06-18

## 2023-06-18 LAB
ANION GAP SERPL CALC-SCNC: 10 MMOL/L (ref 7–16)
BUN SERPL-MCNC: 12 MG/DL (ref 8–22)
CALCIUM SERPL-MCNC: 8.4 MG/DL (ref 8.4–10.2)
CHLORIDE SERPL-SCNC: 107 MMOL/L (ref 96–112)
CO2 SERPL-SCNC: 19 MMOL/L (ref 20–33)
CREAT SERPL-MCNC: 0.6 MG/DL (ref 0.5–1.4)
ERYTHROCYTE [DISTWIDTH] IN BLOOD BY AUTOMATED COUNT: 55.9 FL (ref 35.9–50)
GFR SERPLBLD CREATININE-BSD FMLA CKD-EPI: 102 ML/MIN/1.73 M 2
GLUCOSE SERPL-MCNC: 119 MG/DL (ref 65–99)
HCT VFR BLD AUTO: 36.6 % (ref 42–52)
HCT VFR BLD AUTO: 36.7 % (ref 42–52)
HGB BLD-MCNC: 11.8 G/DL (ref 14–18)
HGB BLD-MCNC: 11.9 G/DL (ref 14–18)
LACTATE SERPL-SCNC: 0.7 MMOL/L (ref 0.5–2)
LACTATE SERPL-SCNC: 0.8 MMOL/L (ref 0.5–2)
MCH RBC QN AUTO: 33.3 PG (ref 27–33)
MCHC RBC AUTO-ENTMCNC: 32.4 G/DL (ref 32.3–36.5)
MCV RBC AUTO: 102.8 FL (ref 81.4–97.8)
PLATELET # BLD AUTO: 115 K/UL (ref 164–446)
PMV BLD AUTO: 9.5 FL (ref 9–12.9)
POTASSIUM SERPL-SCNC: 3.4 MMOL/L (ref 3.6–5.5)
RBC # BLD AUTO: 3.57 M/UL (ref 4.7–6.1)
SODIUM SERPL-SCNC: 136 MMOL/L (ref 135–145)
WBC # BLD AUTO: 7.5 K/UL (ref 4.8–10.8)

## 2023-06-18 PROCEDURE — 700105 HCHG RX REV CODE 258: Performed by: INTERNAL MEDICINE

## 2023-06-18 PROCEDURE — 85014 HEMATOCRIT: CPT

## 2023-06-18 PROCEDURE — 700102 HCHG RX REV CODE 250 W/ 637 OVERRIDE(OP): Performed by: INTERNAL MEDICINE

## 2023-06-18 PROCEDURE — 770001 HCHG ROOM/CARE - MED/SURG/GYN PRIV*

## 2023-06-18 PROCEDURE — A9270 NON-COVERED ITEM OR SERVICE: HCPCS | Performed by: HOSPITALIST

## 2023-06-18 PROCEDURE — 99233 SBSQ HOSP IP/OBS HIGH 50: CPT | Performed by: INTERNAL MEDICINE

## 2023-06-18 PROCEDURE — 93298 REM INTERROG DEV EVAL SCRMS: CPT | Performed by: INTERNAL MEDICINE

## 2023-06-18 PROCEDURE — 8E0ZXY6 ISOLATION: ICD-10-PCS | Performed by: HOSPITALIST

## 2023-06-18 PROCEDURE — 700111 HCHG RX REV CODE 636 W/ 250 OVERRIDE (IP): Performed by: HOSPITALIST

## 2023-06-18 PROCEDURE — 85018 HEMOGLOBIN: CPT

## 2023-06-18 PROCEDURE — 36415 COLL VENOUS BLD VENIPUNCTURE: CPT

## 2023-06-18 PROCEDURE — 700105 HCHG RX REV CODE 258: Performed by: HOSPITALIST

## 2023-06-18 PROCEDURE — 83605 ASSAY OF LACTIC ACID: CPT

## 2023-06-18 PROCEDURE — 700102 HCHG RX REV CODE 250 W/ 637 OVERRIDE(OP): Performed by: HOSPITALIST

## 2023-06-18 PROCEDURE — 97602 WOUND(S) CARE NON-SELECTIVE: CPT

## 2023-06-18 PROCEDURE — 700111 HCHG RX REV CODE 636 W/ 250 OVERRIDE (IP): Performed by: INTERNAL MEDICINE

## 2023-06-18 PROCEDURE — 85027 COMPLETE CBC AUTOMATED: CPT

## 2023-06-18 PROCEDURE — A9270 NON-COVERED ITEM OR SERVICE: HCPCS | Performed by: INTERNAL MEDICINE

## 2023-06-18 PROCEDURE — 80048 BASIC METABOLIC PNL TOTAL CA: CPT

## 2023-06-18 PROCEDURE — 94760 N-INVAS EAR/PLS OXIMETRY 1: CPT

## 2023-06-18 RX ORDER — ACETAMINOPHEN 500 MG
1000 TABLET ORAL EVERY 6 HOURS PRN
COMMUNITY

## 2023-06-18 RX ORDER — CHOLECALCIFEROL (VITAMIN D3) 125 MCG
5 CAPSULE ORAL NIGHTLY
Status: DISCONTINUED | OUTPATIENT
Start: 2023-06-18 | End: 2023-06-18

## 2023-06-18 RX ORDER — POTASSIUM CHLORIDE 20 MEQ/1
20 TABLET, EXTENDED RELEASE ORAL ONCE
Status: COMPLETED | OUTPATIENT
Start: 2023-06-18 | End: 2023-06-18

## 2023-06-18 RX ORDER — CHOLECALCIFEROL (VITAMIN D3) 125 MCG
5-10 CAPSULE ORAL NIGHTLY
Status: DISCONTINUED | OUTPATIENT
Start: 2023-06-18 | End: 2023-06-20 | Stop reason: HOSPADM

## 2023-06-18 RX ADMIN — SENNOSIDES AND DOCUSATE SODIUM 2 TABLET: 50; 8.6 TABLET ORAL at 18:22

## 2023-06-18 RX ADMIN — BACLOFEN 20 MG: 10 TABLET ORAL at 18:23

## 2023-06-18 RX ADMIN — BACLOFEN 20 MG: 10 TABLET ORAL at 20:39

## 2023-06-18 RX ADMIN — ACETAMINOPHEN 650 MG: 325 TABLET ORAL at 14:53

## 2023-06-18 RX ADMIN — MEROPENEM 500 MG: 500 INJECTION, POWDER, FOR SOLUTION INTRAVENOUS at 18:25

## 2023-06-18 RX ADMIN — MEROPENEM 500 MG: 500 INJECTION, POWDER, FOR SOLUTION INTRAVENOUS at 12:40

## 2023-06-18 RX ADMIN — ACETAMINOPHEN 650 MG: 325 TABLET ORAL at 06:35

## 2023-06-18 RX ADMIN — Medication 10 MG: at 21:22

## 2023-06-18 RX ADMIN — BACLOFEN 20 MG: 10 TABLET ORAL at 12:44

## 2023-06-18 RX ADMIN — POTASSIUM CHLORIDE 20 MEQ: 1500 TABLET, EXTENDED RELEASE ORAL at 08:04

## 2023-06-18 RX ADMIN — BACLOFEN 20 MG: 10 TABLET ORAL at 06:06

## 2023-06-18 RX ADMIN — ASPIRIN 81 MG: 81 TABLET, COATED ORAL at 06:06

## 2023-06-18 RX ADMIN — MEROPENEM 500 MG: 500 INJECTION, POWDER, FOR SOLUTION INTRAVENOUS at 23:56

## 2023-06-18 RX ADMIN — Medication 5 MG: at 03:08

## 2023-06-18 RX ADMIN — POTASSIUM CHLORIDE 20 MEQ: 1500 TABLET, EXTENDED RELEASE ORAL at 12:39

## 2023-06-18 RX ADMIN — MEROPENEM 500 MG: 500 INJECTION, POWDER, FOR SOLUTION INTRAVENOUS at 06:08

## 2023-06-18 ASSESSMENT — LIFESTYLE VARIABLES
TOTAL SCORE: 0
ON A TYPICAL DAY WHEN YOU DRINK ALCOHOL HOW MANY DRINKS DO YOU HAVE: 2
HAVE YOU EVER FELT YOU SHOULD CUT DOWN ON YOUR DRINKING: NO
CONSUMPTION TOTAL: NEGATIVE
EVER FELT BAD OR GUILTY ABOUT YOUR DRINKING: NO
TOTAL SCORE: 0
HAVE PEOPLE ANNOYED YOU BY CRITICIZING YOUR DRINKING: NO
ALCOHOL_USE: YES
HOW MANY TIMES IN THE PAST YEAR HAVE YOU HAD 5 OR MORE DRINKS IN A DAY: 0
AVERAGE NUMBER OF DAYS PER WEEK YOU HAVE A DRINK CONTAINING ALCOHOL: 7
EVER HAD A DRINK FIRST THING IN THE MORNING TO STEADY YOUR NERVES TO GET RID OF A HANGOVER: NO
SUBSTANCE_ABUSE: 0
TOTAL SCORE: 0

## 2023-06-18 ASSESSMENT — ENCOUNTER SYMPTOMS
COUGH: 0
FALLS: 0
BLURRED VISION: 0
FEVER: 1
HEARTBURN: 0
PALPITATIONS: 0
NERVOUS/ANXIOUS: 0
ABDOMINAL PAIN: 0
DIZZINESS: 0
BACK PAIN: 0
HEADACHES: 0
DOUBLE VISION: 0
SHORTNESS OF BREATH: 0
VOMITING: 0
CHILLS: 1

## 2023-06-18 ASSESSMENT — COGNITIVE AND FUNCTIONAL STATUS - GENERAL
TURNING FROM BACK TO SIDE WHILE IN FLAT BAD: A LOT
SUGGESTED CMS G CODE MODIFIER MOBILITY: CM
HELP NEEDED FOR BATHING: TOTAL
CLIMB 3 TO 5 STEPS WITH RAILING: TOTAL
MOVING FROM LYING ON BACK TO SITTING ON SIDE OF FLAT BED: A LOT
DRESSING REGULAR LOWER BODY CLOTHING: A LOT
WALKING IN HOSPITAL ROOM: TOTAL
TOILETING: A LOT
DRESSING REGULAR UPPER BODY CLOTHING: A LOT
MOVING TO AND FROM BED TO CHAIR: A LOT
MOBILITY SCORE: 9
SUGGESTED CMS G CODE MODIFIER DAILY ACTIVITY: CL
STANDING UP FROM CHAIR USING ARMS: TOTAL
DAILY ACTIVITIY SCORE: 12
EATING MEALS: A LITTLE
PERSONAL GROOMING: A LOT

## 2023-06-18 ASSESSMENT — PAIN DESCRIPTION - PAIN TYPE
TYPE: ACUTE PAIN

## 2023-06-18 ASSESSMENT — FIBROSIS 4 INDEX: FIB4 SCORE: 2.48

## 2023-06-18 NOTE — ASSESSMENT & PLAN NOTE
-On admission, his volume is 13.1 with MCV of 103.9.  Patient states that he is taking iron supplementation.  May need to reconsider iron pills as he has macrocytosis.  Follow-up outpatient for this.    6/18: Monitor for signs of bleeding. We will repeat Hb later this afternoon.    6/19: Hb seems stable, no signs of bleeding.

## 2023-06-18 NOTE — ED PROVIDER NOTES
ED Provider Note    CHIEF COMPLAINT  Chief Complaint   Patient presents with    Fever     Pt report having fever started today 4am, took tylenol at 1pm. Pt denies N/V/D, cough, SOB or chest pain. Pt has hutchins cath changed by urologist last Thursday. He has colostomy in place. Hx of paralysis on wheelchair        EXTERNAL RECORDS REVIEWED  Outpatient Notes reviewed family medicine note from [facility] by MARCELO Sandy, patient has known stage IV sacrococcygeal pressure ulcer, history of paraplegia    HPI/ROS  LIMITATION TO HISTORY   Select: : None  OUTSIDE HISTORIAN(S):  None available    Osvaldo Pate is a 72 y.o. male who presents for evaluation of fever.  Patient notes he woke up this morning about 4 AM noting his temperature was 100.1.  He took Tylenol and went back to bed.  Rechecked his temperature and it was 99.  The notes of her fever has been fairly persistent, Tmax at home was 102.  Last dose of Tylenol was 1 PM.  Found to be febrile here in the ED with temperature of 102.8.  Of note he has a history of  partial quadriplegia and colostomy in place as well as a suprapubic catheter.  Catheter was changed last Thursday.  He is chronically wheelchair-bound given C5/C7 injury    PAST MEDICAL HISTORY   has a past medical history of A-fib ([facility]), Acute cystitis without hematuria (10/23/2021), Arrhythmia, Atrial flutter ([facility]), Blood clotting disorder ([facility]), Bowel habit changes, Clot hematuria (1/23/2023), GERD (gastroesophageal reflux disease), Hypertension, Kidney stones, Neurogenic bladder, Open wound (11/18/2022), Quadriplegia, C5-C7 complete ([facility]), and Suprapubic catheter ([facility]).    SURGICAL HISTORY   has a past surgical history that includes percutaneouospinning lower extremity; colostomy; colostomy takedown; colostomy (N/A, 07/27/2019); ulcer debridement (N/A, 08/21/2019); flap closure (08/21/2019); hernia repair; irrigation & debridement general (12/20/2020); cystoscopy,insert ureteral stent  (Left, 2021); cysto/uretero/pyeloscopy, dx (Left, 2021); lasertripsy (Left, 2021); cystoscopy,insert ureteral stent (Left, 2022); cysto/uretero/pyeloscopy, dx (Left, 2022); lasertripsy (N/A, 2022); incision and drainage orthopedic (2022); incision and drainage orthopedic (Left, 2022); bone biopsy (Left, 2022); irrigation & debridement general (Left, 2022); wound closure neuro (Left, 2022); orthopedic osteotomy (Left, 2022); and orif, ankle.    FAMILY HISTORY  Family History   Problem Relation Age of Onset    Heart Disease Father        SOCIAL HISTORY  Social History     Tobacco Use    Smoking status: Former     Packs/day: 1.00     Years: 10.00     Pack years: 10.00     Types: Cigarettes     Start date: 1967     Quit date: 1977     Years since quittin.4    Smokeless tobacco: Never   Vaping Use    Vaping Use: Never used   Substance and Sexual Activity    Alcohol use: Yes     Alcohol/week: 4.2 oz     Types: 7 Standard drinks or equivalent per week     Comment: 2/day    Drug use: No    Sexual activity: Not on file       CURRENT MEDICATIONS  Home Medications       Reviewed by Neris Epperson R.N. (Registered Nurse) on 23 at 0110  Med List Status: Complete     Medication Last Dose Status   amLODIPine (NORVASC) 10 MG Tab 2023 Active   Ascorbic Acid (VITAMIN C) 1000 MG Tab 2023 Active   aspirin EC 81 MG EC tablet 2023 Active   bacitracin 500 UNIT/GM ointment 2023 Active   baclofen (LIORESAL) 20 MG tablet 2023 Active   benzonatate (TESSALON) 100 MG Cap  Active   Cholecalciferol (VITAMIN D3) 2000 UNIT Cap 2023 Active   diclofenac sodium (VOLTAREN) 1 % Gel 2023 Active   docusate sodium (COLACE) 100 MG Cap 2023 Active   ferrous sulfate 325 (65 Fe) MG tablet 2023 Active   losartan (COZAAR) 50 MG Tab  Active   Magnesium 400 MG Tab 2023 Active   melatonin 5 mg Tab 2023 Active  "  polyethylene glycol/lytes (MIRALAX) 17 g Pack  Active   Probiotic Product (PROBIOTIC PO) 6/17/2023 Active   sennosides (SENOKOT) 8.6 MG Tab  Active   Zinc 50 MG Tab 6/17/2023 Active                    ALLERGIES  Allergies   Allergen Reactions    Sulfa Drugs Rash     Rash, developed this back in 2015 after being placed on \"sulfa antibiotic for my wound\". Antibiotic was stopped and rash went away. Patient states he had a sulfa antibiotic prior to that time back when he was younger w/o a reaction.          PHYSICAL EXAM  VITAL SIGNS: /54   Pulse 92   Temp 37.7 °C (99.9 °F)   Resp 20   Ht 1.778 m (5' 10\")   Wt 104 kg (229 lb 8 oz)   SpO2 90%   BMI 32.93 kg/m²    General: Alert, no acute distress  Skin: Warm, dry, normal for ethnicity  Head: Normocephalic, atraumatic  Neck: Trachea midline  Eye: PERRL, normal conjunctiva  ENMT: Oral mucosa pink and dry, no pharyngeal erythema or exudate  Cardiovascular: Regular rate and rhythm, No murmur, Normal peripheral perfusion  Respiratory: Lungs CTA, respirations are non-labored, breath sounds are equal  Gastrointestinal: Soft, colostomy noted to left lower quadrant.  Suprapubic catheter site is clean, dry, intact without significant exudate, mild erythema.  Non distended  Musculoskeletal: No swelling, no deformity  Neurological: Alert and oriented to person, place, time, and situation  Lymphatics: No lymphadenopathy  Psychiatric: Cooperative, appropriate mood & affect     DIAGNOSTIC STUDIES / PROCEDURES      LABS  Results for orders placed or performed during the hospital encounter of 06/17/23   CBC With Differential   Result Value Ref Range    WBC 10.1 4.8 - 10.8 K/uL    RBC 3.86 (L) 4.70 - 6.10 M/uL    Hemoglobin 13.1 (L) 14.0 - 18.0 g/dL    Hematocrit 40.1 (L) 42.0 - 52.0 %    .9 (H) 81.4 - 97.8 fL    MCH 33.9 (H) 27.0 - 33.0 pg    MCHC 32.7 32.3 - 36.5 g/dL    RDW 57.0 (H) 35.9 - 50.0 fL    Platelet Count 137 (L) 164 - 446 K/uL    MPV 9.0 9.0 - 12.9 fL "    Neutrophils-Polys 87.20 (H) 44.00 - 72.00 %    Lymphocytes 3.40 (L) 22.00 - 41.00 %    Monocytes 7.70 0.00 - 13.40 %    Eosinophils 1.00 0.00 - 6.90 %    Basophils 0.20 0.00 - 1.80 %    Immature Granulocytes 0.50 0.00 - 0.90 %    Nucleated RBC 0.00 0.00 - 0.20 /100 WBC    Neutrophils (Absolute) 8.78 (H) 1.82 - 7.42 K/uL    Lymphs (Absolute) 0.34 (L) 1.00 - 4.80 K/uL    Monos (Absolute) 0.77 0.00 - 0.85 K/uL    Eos (Absolute) 0.10 0.00 - 0.51 K/uL    Baso (Absolute) 0.02 0.00 - 0.12 K/uL    Immature Granulocytes (abs) 0.05 0.00 - 0.11 K/uL    NRBC (Absolute) 0.00 K/uL   Comp Metabolic Panel   Result Value Ref Range    Sodium 136 135 - 145 mmol/L    Potassium 3.7 3.6 - 5.5 mmol/L    Chloride 105 96 - 112 mmol/L    Co2 22 20 - 33 mmol/L    Anion Gap 9.0 7.0 - 16.0    Glucose 133 (H) 65 - 99 mg/dL    Bun 13 8 - 22 mg/dL    Creatinine 0.80 0.50 - 1.40 mg/dL    Calcium 9.0 8.4 - 10.2 mg/dL    AST(SGOT) 10 (L) 12 - 45 U/L    ALT(SGPT) <5 2 - 50 U/L    Alkaline Phosphatase 78 30 - 99 U/L    Total Bilirubin 0.6 0.1 - 1.5 mg/dL    Albumin 3.6 3.2 - 4.9 g/dL    Total Protein 7.3 6.0 - 8.2 g/dL    Globulin 3.7 (H) 1.9 - 3.5 g/dL    A-G Ratio 1.0 g/dL   Lactic Acid   Result Value Ref Range    Lactic Acid 1.4 0.5 - 2.0 mmol/L   Urinalysis    Specimen: Urine   Result Value Ref Range    Color Yellow     Character Hazy (A)     Specific Gravity 1.020 <1.035    Ph 6.5 5.0 - 8.0    Glucose Negative Negative mg/dL    Ketones Trace (A) Negative mg/dL    Protein Negative Negative mg/dL    Bilirubin Negative Negative    Nitrite Positive (A) Negative    Leukocyte Esterase Small (A) Negative    Occult Blood Moderate (A) Negative    Micro Urine Req Microscopic    CoV-2, Flu A/B, And RSV by PCR ([facility])    Specimen: Respirate   Result Value Ref Range    Influenza virus A RNA Negative Negative    Influenza virus B, PCR Negative Negative    RSV, PCR Negative Negative    SARS-CoV-2 by PCR DETECTED (AA)     SARS-CoV-2 Source NP Swab    URINE  MICROSCOPIC (W/UA)   Result Value Ref Range    WBC 20-50 (A) /hpf    RBC 10-20 (A) /hpf    Bacteria Many (A) None /hpf    Epithelial Cells Rare Few /hpf    Mucous Threads Moderate /hpf    Budding Yeast Present (A) Absent /hpf    Hyphae Yeast Present (A) Absent /hpf   CORRECTED CALCIUM   Result Value Ref Range    Correct Calcium 9.3 8.5 - 10.5 mg/dL   ESTIMATED GFR   Result Value Ref Range    GFR (CKD-EPI) 93 >60 mL/min/1.73 m 2   CBC without Differential   Result Value Ref Range    WBC 7.5 4.8 - 10.8 K/uL    RBC 3.57 (L) 4.70 - 6.10 M/uL    Hemoglobin 11.9 (L) 14.0 - 18.0 g/dL    Hematocrit 36.7 (L) 42.0 - 52.0 %    .8 (H) 81.4 - 97.8 fL    MCH 33.3 (H) 27.0 - 33.0 pg    MCHC 32.4 32.3 - 36.5 g/dL    RDW 55.9 (H) 35.9 - 50.0 fL    Platelet Count 115 (L) 164 - 446 K/uL    MPV 9.5 9.0 - 12.9 fL   Basic Metabolic Panel (BMP)   Result Value Ref Range    Sodium 136 135 - 145 mmol/L    Potassium 3.4 (L) 3.6 - 5.5 mmol/L    Chloride 107 96 - 112 mmol/L    Co2 19 (L) 20 - 33 mmol/L    Glucose 119 (H) 65 - 99 mg/dL    Bun 12 8 - 22 mg/dL    Creatinine 0.60 0.50 - 1.40 mg/dL    Calcium 8.4 8.4 - 10.2 mg/dL    Anion Gap 10.0 7.0 - 16.0   Prothrombin time (INR)   Result Value Ref Range    PT 15.5 (H) 12.0 - 14.6 sec    INR 1.25 (H) 0.87 - 1.13   ESTIMATED GFR   Result Value Ref Range    GFR (CKD-EPI) 102 >60 mL/min/1.73 m 2        RADIOLOGY  I have independently interpreted the diagnostic imaging associated with this visit and am waiting the final reading from the radiologist.   My preliminary interpretation is as follows: No lobar infiltrate  Radiologist interpretation:   DX-CHEST-PORTABLE (1 VIEW)   Final Result      1.  Bibasilar underinflation atelectasis which could obscure an additional process.   2.  Persistently enlarged cardiac silhouette           COURSE & MEDICAL DECISION MAKING    ED Observation Status? Yes; I am placing the patient in to an observation status due to a diagnostic uncertainty as well as  "therapeutic intensity. Patient placed in observation status at 9:35 PM, 6/17/2023.     Observation plan is as follows: Septic work-up will be obtained, patient treated with LR bolus 30 mill per kilogram, acetaminophen 1 g p.o.  Differential diagnosis includes but not restricted to pneumonia, UTI, sepsis, viral illness    2246: Patient reassessed, I have updated him with concerning findings consistent with sepsis.  Have paged the hospitalist for admission.  I have initiated meropenem 500 mg IV per recommendation by pharmacy below.I examined the patient 6/17/2023 2246  Vital Signs:/54   Pulse 92   Temp 37.7 °C (99.9 °F)   Resp 20   Ht 1.778 m (5' 10\")   Wt 104 kg (229 lb 8 oz)   SpO2 90%   BMI 32.93 kg/m²   Cardiac examination significant for Tachycardia  Pulmonary examination significant for Clear lung fileds  Capillary refill is brisk  Peripheral Pulse is 2+   Skin is normal     Patient is critically ill.   The patient continues to have: Evidence of septicemia including tachycardia, fever, and urinary infectious source  The vital organ system that is affected is the: Cardiovascular, urinary tract  If untreated there is a high chance of deterioration into: Septic shock  And eventually death.   The critical care that I am providing today is: IV crystalloid fluid resuscitation, initiation of IV broad-spectrum antibiotics after cultures drawn, Hospital medicine consultation, pharmacy consultation  The critical that has been undertaken is medically complex.   There has been no overlap in critical care time.   Critical Care Time not including procedures:  77 minutes    Patient Vitals for the past 24 hrs:   BP Temp Temp src Pulse Resp SpO2 Height Weight   06/18/23 0125 -- -- -- -- -- -- -- 104 kg (229 lb 8 oz)   06/18/23 0112 -- 37.7 °C (99.9 °F) -- -- -- -- -- --   06/18/23 0043 130/54 (!) 39.2 °C (102.6 °F) Oral 92 20 90 % -- --   06/17/23 2253 118/64 -- -- 100 20 91 % -- --   06/17/23 2240 -- (!) 38.1 °C " "(100.6 °F) Oral (!) 104 19 91 % -- --   06/17/23 2223 127/61 -- -- (!) 103 20 91 % -- --   06/17/23 2153 132/54 -- -- 85 18 91 % -- --   06/17/23 2123 (!) 142/65 -- -- 76 -- 90 % -- --   06/17/23 2121 -- -- -- -- 16 -- -- --   06/17/23 2113 (!) 147/65 -- -- 82 -- 90 % -- --   06/17/23 2030 119/66 (!) 39.3 °C (102.8 °F) Temporal 70 20 93 % 1.778 m (5' 10\") 101 kg (222 lb)        Upon Reevaluation, the patient's condition has: not improved; and will be escalated to hospitalization.    Patient discharged from ED Observation status at 2346 (Time) 6/17/23 (Date).     INITIAL ASSESSMENT, COURSE AND PLAN  Care Narrative: This is a 72-year-old gentleman who presents for evaluation of fever and chills.  He has a history of partial quadriplegia and has a suprapubic catheter in place.  This was changed fairly recently.  Patient febrile throughout the day with only transient improvement with Tylenol as delineated above.  Work-up demonstrates fever with tachycardia, evidence of UTI, neutrophil predominance on CBC; this is consistent with sepsis per SIRS criteria.  IV antibiotics initiated, IV fluids initiated, clear indication for admission.  HYDRATION: Based on the patient's presentation of Sepsis and Tachycardia the patient was given IV fluids. IV Hydration was used because oral hydration was not adequate alone. Upon recheck following hydration, the patient was doing better, heart rate improving, currently 100.      ADDITIONAL PROBLEM LIST  UTI, sepsis  DISPOSITION AND DISCUSSIONS  I have discussed management of the patient with the following physicians and MARCO's: I spoke with the hospitalist Dr. Ariela Gonzalez who concurs with plan of care thus far and accepts the patient to her service for admission to the medical surgical floor in improved but guarded condition.    Discussion of management with other [facility] or appropriate source(s): Pharmacy spoke with pharmacist with regard to antibiotic choice given history of ESBL, we have " reviewed the patient's urine cultures, recommends merropenem      Escalation of care considered, and ultimately not performed:NA    Barriers to care at this time, including but not limited to:  NA .     Decision tools and prescription drugs considered including, but not limited to:  SIRS criteria for sepsis are not met .    FINAL DIAGNOSIS  1. Sepsis without acute organ dysfunction, due to unspecified organism (HCC)    2. Acute cystitis without hematuria           Electronically signed by: Dolores Mcmullen M.D., 6/17/2023 9:13 PM
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
## 2023-06-18 NOTE — PROGRESS NOTES
4 Eyes Skin Assessment Completed by ZAIRA Cordon and ZAIRA Baugh.    Head WDL  Ears WDL  Nose WDL  Mouth WDL  Neck WDL  Breast/Chest WDL  Shoulder Blades WDL  Spine WDL  (R) Arm/Elbow/Hand WDL  (L) Arm/Elbow/Hand WDL  Abdomen WDL  Ostomy in LUQ  Groin Redness Suprapubic cath placed PTA  Scrotum/Coccyx/Buttocks Redness and Non-Blanching  (R) Leg Redness  (L) Leg Redness  (R) Heel/Foot/Toe WDL  (L) Heel/Foot/Toe WDL    Coccyx     LUQ Ostomy    Right lateral knee    Left medial shin    Left lateral foot         Interventions In Place Heel Mepilex, Sacral Mepilex, Heel Float Boots, Waffle Overlay, TAP System, Pillows, and Q2 Turns    Possible Skin Injury Yes    Pictures Uploaded Into Epic Yes  Wound Consult Placed Yes  RN Wound Prevention Protocol Ordered Yes
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
## 2023-06-18 NOTE — PROGRESS NOTES
Pt has slightly elevated temp ( see flow doc) RN administered PRN Tylenol, encouraged oral fluids, encouraged CDB, cool cloth applied. RN  / CNA will continue to monitor and recheck VS.

## 2023-06-18 NOTE — ASSESSMENT & PLAN NOTE
-As above.  Colostomy has dark/black stool.  Patient states he is on iron replacement and that is his usual.      6/18: Colostomy care, and we will monitor for signs of bleeding.

## 2023-06-18 NOTE — ED NOTES
Verbal order from ERP- do not give fosfomycin.   Pt agreeable with poc for admission, voices no questions or concerns at this time.

## 2023-06-18 NOTE — ED TRIAGE NOTES
"Chief Complaint   Patient presents with    Fever     Pt report having fever started today 4am, took tylenol at 1pm. Pt denies N/V/D, cough, SOB or chest pain. Pt has hutchins cath changed by urologist last Thursday. He has colostomy in place. Hx of paralysis on wheelchair      /66   Pulse 70   Temp (!) 39.3 °C (102.8 °F) (Temporal)   Resp 20   Ht 1.778 m (5' 10\")   Wt 101 kg (222 lb)   SpO2 93%   BMI 31.85 kg/m²     "
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
## 2023-06-18 NOTE — CARE PLAN
The patient is Stable - Low risk of patient condition declining or worsening    Shift Goals  Clinical Goals: Remain afebrile throughout shift  Patient Goals: Get sleeping pill    Progress made toward(s) clinical / shift goals:    Problem: Knowledge Deficit - Standard  Goal: Patient and family/care givers will demonstrate understanding of plan of care, disease process/condition, diagnostic tests and medications  Outcome: Progressing     Problem: Hemodynamics  Goal: Patient's hemodynamics, fluid balance and neurologic status will be stable or improve  Outcome: Progressing     Problem: Urinary - Renal Perfusion  Goal: Ability to achieve and maintain adequate renal perfusion and functioning will improve  Outcome: Progressing     Problem: Skin Integrity  Goal: Skin integrity is maintained or improved  Outcome: Progressing     Problem: Fall Risk  Goal: Patient will remain free from falls  Outcome: Progressing       Patient is not progressing towards the following goals:

## 2023-06-18 NOTE — ASSESSMENT & PLAN NOTE
-Inpatient status to medical floor.  -Patient has fever of 102, tachycardia and acute UTI.  Prior history of ESBL in April 2023.  Patient has underlying suprapubic catheter due to underlying partial quadriplegia.  -Patient was started on meropenem in the emergency department which we will continue.  Might need antibiotic stewardship team evaluating for other alternatives in the morning.  -There is no endorgan dysfunction so per sepsis 3 criteria, he is not septic on admission.    6/18: Continue meropenem for now, follow cultures.  I discussed case with Dr. Lange of urology to see if we would require to replace the suprapubic catheter, Dr. Lange recommends to keep the patient on antibiotics for 2 to 3 days and then have nursing staff replace the catheter.    6/19: Patient symptoms seem to be improving.  We are still pending urine culture continue with meropenem for now.  We will ask nursing tomorrow to replace suprapubic catheter as per urology's recommendation.

## 2023-06-18 NOTE — PROGRESS NOTES
Received bedside report from biju Marks RN at 1:35 am. Assumed pt care.   Pt seen resting in bed. AO4, on room air. No pain and nausea reported at this time. POC discussed. Q2 turns, waffle overlay, taps, isolation precautions, safety and fall precautions in place. Bed alarm on.   Call light kept within reach.  Pt requesting melatonin to aid sleep, MD made aware.    Will continue to monitor.

## 2023-06-18 NOTE — PROGRESS NOTES
Med rec updated and complete, per MAR from  [facility] ([phone])  Allergies reviewed, per pt

## 2023-06-18 NOTE — ED NOTES
IV fluid bolus started. Tylenol administered. Covid swab collected; second set of blood cultures collected.   Xray at bedside.   Pt voices no questions or concerns at this time.

## 2023-06-18 NOTE — ASSESSMENT & PLAN NOTE
-Under the context of partial quadriplegia.     6/18: Concern for uti in the setting of suprapubic catheter.    6/19: We will ask nursing to replace suprapubic catheter tomorrow as per urology's recommendation.

## 2023-06-18 NOTE — ED NOTES
Pt states has had a fever that started earlier today, tmax 101 at home. Denies any pain. Lung sounds clear, denies cough. Has not taken tylenol/ibu today.   PIV established, blood work including first set of blood cultures sent to lab.   Safety precautions in place including gurney locked in lowest position, both side rails up, call light within reach. Pt educated to use call light if requiring any assistance with getting oob.

## 2023-06-18 NOTE — CARE PLAN
The patient is Stable - Low risk of patient condition declining or worsening    Shift Goals RN / CNA to ensure pt is turned q 2 t/o day shift for skin integrity  Clinical Goals: Remain afebrile throughout shift  Patient Goals: Prefer to elevate LLE on pillow t/o shift    Progress made toward(s) clinical / shift goals:        Patient is not progressing towards the following goals:

## 2023-06-18 NOTE — PROGRESS NOTES
Pt upright in bed having meal, calm, cooperative, pleasant affect.  has been in to see pt this a.m., reviewed POC which is for continued abx tx. Contact precautions for COVID in place. RN and CNA have collaborated for pt Q 2 turns for skin integrity. Fall and safety precautions in place, pt uses call light appropriately. Pt has ASA for VTE. RN encouraged CDB to promote clear lungs, will reinforce t/o day. Hourly rounds ongoing, call light w/in reach.

## 2023-06-19 LAB
ANION GAP SERPL CALC-SCNC: 8 MMOL/L (ref 7–16)
BACTERIA UR CULT: NORMAL
BUN SERPL-MCNC: 10 MG/DL (ref 8–22)
CALCIUM SERPL-MCNC: 8.5 MG/DL (ref 8.4–10.2)
CHLORIDE SERPL-SCNC: 111 MMOL/L (ref 96–112)
CO2 SERPL-SCNC: 21 MMOL/L (ref 20–33)
CREAT SERPL-MCNC: 0.55 MG/DL (ref 0.5–1.4)
ERYTHROCYTE [DISTWIDTH] IN BLOOD BY AUTOMATED COUNT: 57.8 FL (ref 35.9–50)
GFR SERPLBLD CREATININE-BSD FMLA CKD-EPI: 105 ML/MIN/1.73 M 2
GLUCOSE SERPL-MCNC: 109 MG/DL (ref 65–99)
HCT VFR BLD AUTO: 35.5 % (ref 42–52)
HGB BLD-MCNC: 11.4 G/DL (ref 14–18)
MAGNESIUM SERPL-MCNC: 2 MG/DL (ref 1.5–2.5)
MCH RBC QN AUTO: 33.3 PG (ref 27–33)
MCHC RBC AUTO-ENTMCNC: 32.1 G/DL (ref 32.3–36.5)
MCV RBC AUTO: 103.8 FL (ref 81.4–97.8)
PLATELET # BLD AUTO: 116 K/UL (ref 164–446)
PLATELETS.RETICULATED NFR BLD AUTO: 1.3 % (ref 0.6–13.1)
PMV BLD AUTO: 8.9 FL (ref 9–12.9)
POTASSIUM SERPL-SCNC: 4 MMOL/L (ref 3.6–5.5)
RBC # BLD AUTO: 3.42 M/UL (ref 4.7–6.1)
SIGNIFICANT IND 70042: NORMAL
SITE SITE: NORMAL
SODIUM SERPL-SCNC: 140 MMOL/L (ref 135–145)
SOURCE SOURCE: NORMAL
WBC # BLD AUTO: 5.4 K/UL (ref 4.8–10.8)

## 2023-06-19 PROCEDURE — 770001 HCHG ROOM/CARE - MED/SURG/GYN PRIV*

## 2023-06-19 PROCEDURE — 700105 HCHG RX REV CODE 258: Performed by: INTERNAL MEDICINE

## 2023-06-19 PROCEDURE — A9270 NON-COVERED ITEM OR SERVICE: HCPCS | Performed by: HOSPITALIST

## 2023-06-19 PROCEDURE — 36415 COLL VENOUS BLD VENIPUNCTURE: CPT

## 2023-06-19 PROCEDURE — 85027 COMPLETE CBC AUTOMATED: CPT

## 2023-06-19 PROCEDURE — 83735 ASSAY OF MAGNESIUM: CPT

## 2023-06-19 PROCEDURE — 700102 HCHG RX REV CODE 250 W/ 637 OVERRIDE(OP): Performed by: HOSPITALIST

## 2023-06-19 PROCEDURE — 85055 RETICULATED PLATELET ASSAY: CPT

## 2023-06-19 PROCEDURE — 700111 HCHG RX REV CODE 636 W/ 250 OVERRIDE (IP): Performed by: INTERNAL MEDICINE

## 2023-06-19 PROCEDURE — 94760 N-INVAS EAR/PLS OXIMETRY 1: CPT

## 2023-06-19 PROCEDURE — 80048 BASIC METABOLIC PNL TOTAL CA: CPT

## 2023-06-19 PROCEDURE — 99233 SBSQ HOSP IP/OBS HIGH 50: CPT | Performed by: INTERNAL MEDICINE

## 2023-06-19 RX ADMIN — POLYETHYLENE GLYCOL 3350 1 PACKET: 17 POWDER, FOR SOLUTION ORAL at 20:23

## 2023-06-19 RX ADMIN — Medication 10 MG: at 20:23

## 2023-06-19 RX ADMIN — ACETAMINOPHEN 650 MG: 325 TABLET ORAL at 21:34

## 2023-06-19 RX ADMIN — MEROPENEM 500 MG: 500 INJECTION, POWDER, FOR SOLUTION INTRAVENOUS at 23:18

## 2023-06-19 RX ADMIN — ACETAMINOPHEN 650 MG: 325 TABLET ORAL at 00:04

## 2023-06-19 RX ADMIN — SENNOSIDES AND DOCUSATE SODIUM 2 TABLET: 50; 8.6 TABLET ORAL at 05:38

## 2023-06-19 RX ADMIN — ACETAMINOPHEN 650 MG: 325 TABLET ORAL at 08:31

## 2023-06-19 RX ADMIN — MEROPENEM 500 MG: 500 INJECTION, POWDER, FOR SOLUTION INTRAVENOUS at 05:41

## 2023-06-19 RX ADMIN — MEROPENEM 500 MG: 500 INJECTION, POWDER, FOR SOLUTION INTRAVENOUS at 12:41

## 2023-06-19 RX ADMIN — ASPIRIN 81 MG: 81 TABLET, COATED ORAL at 05:38

## 2023-06-19 RX ADMIN — SENNOSIDES AND DOCUSATE SODIUM 2 TABLET: 50; 8.6 TABLET ORAL at 17:12

## 2023-06-19 RX ADMIN — MEROPENEM 500 MG: 500 INJECTION, POWDER, FOR SOLUTION INTRAVENOUS at 17:16

## 2023-06-19 RX ADMIN — BACLOFEN 20 MG: 10 TABLET ORAL at 05:38

## 2023-06-19 RX ADMIN — BACLOFEN 20 MG: 10 TABLET ORAL at 20:23

## 2023-06-19 RX ADMIN — BACLOFEN 20 MG: 10 TABLET ORAL at 17:13

## 2023-06-19 RX ADMIN — BACLOFEN 20 MG: 10 TABLET ORAL at 12:38

## 2023-06-19 ASSESSMENT — ENCOUNTER SYMPTOMS
FEVER: 0
PALPITATIONS: 0
BLURRED VISION: 0
SHORTNESS OF BREATH: 0
DOUBLE VISION: 0
BACK PAIN: 0
HEARTBURN: 0
DIZZINESS: 0
NERVOUS/ANXIOUS: 0
HEADACHES: 0
COUGH: 0
VOMITING: 0
CHILLS: 0
ABDOMINAL PAIN: 0
FALLS: 0

## 2023-06-19 ASSESSMENT — LIFESTYLE VARIABLES: SUBSTANCE_ABUSE: 0

## 2023-06-19 ASSESSMENT — PAIN DESCRIPTION - PAIN TYPE
TYPE: CHRONIC PAIN
TYPE: ACUTE PAIN
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
## 2023-06-19 NOTE — DISCHARGE PLANNING
Case Management Discharge Planning    Admission Date: 6/17/2023  GMLOS: 3.4  ALOS: 2    6-Clicks ADL Score: 12  6-Clicks Mobility Score: 9  PT and/or OT Eval ordered: No  Post-acute Referrals Ordered: No  Post-acute Choice Obtained: NA  Has referral(s) been sent to post-acute provider:  BLANKA      Anticipated Discharge Dispo: Discharge Disposition: Discharged to home/self care (01)    DME Needed: No    Action(s) Taken: Spoke to Aron from [facility]. Per Aron, they can accept pt back to  with COVID-19 as they can accommodate isolation requirements.    Escalations Completed: None    Medically Clear: No    Next Steps:  f/u with medical team to discuss DC needs and barriers    Barriers to Discharge: Medical clearance

## 2023-06-19 NOTE — THERAPY
Physical Therapy Contact Note    Patient Name: Osvaldo Pate  Age:  72 y.o., Sex:  male  Medical Record #: [id number]  Today's Date: 6/19/2023 06/19/23 0809   Interdisciplinary Plan of Care Collaboration   IDT Collaboration with  Nursing   Collaboration Comments PT order received. Pt is currently at his baseline with functional mobility. Pt states he is primarily dependent for all functional mobility at baseline. States of brigid lift transfers from bed <> electric w/c. Pt typically requires offloading of buttocks while in supine as well. Encouraged pt to continue offloading and turning every hour with nsg assist while in acute care setting to avoid wounds. Pt is in no acute skilled PT needs at this time, anticipate pt to return back to group home with current level of assist once he is medically cleared to d/c.

## 2023-06-19 NOTE — CARE PLAN
The patient is Stable - Low risk of patient condition declining or worsening    Shift Goals  Clinical Goals: monitor vital signs; adm abx as ordered; q2 turns  Patient Goals: rest well  Family Goals: no family present    Progress made toward(s) clinical / shift goals:    Problem: Knowledge Deficit - Standard  Goal: Patient and family/care givers will demonstrate understanding of plan of care, disease process/condition, diagnostic tests and medications  Outcome: Progressing  Note: Patient updated on plan of care, questions addressed.      Problem: Hemodynamics  Goal: Patient's hemodynamics, fluid balance and neurologic status will be stable or improve  Outcome: Progressing  Note: Vital signs stable at this time. Sufficient intake and appropriate urinary output.      Problem: Skin Integrity  Goal: Skin integrity is maintained or improved  Outcome: Progressing  Note: Patient seen by wound nurse today, wounds cleaned and dressed.      Problem: Fall Risk  Goal: Patient will remain free from falls  Outcome: Progressing  Note: Bed/strip alarm on. Bed in low locked position. Call light within reach, pt calls appropriately.
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
## 2023-06-19 NOTE — WOUND TEAM
Wound team assessed patient this evening 6/18/23.  New orders placed, LDAs updated, Note to follow tomorrow.

## 2023-06-19 NOTE — DIETARY
NUTRITION SERVICES - Alert received for newly identified wound. Per wound team consult, pt with resolving stage 4 pressure injuries to left heel, LLE, and coccyx. Pt is receiving a Cardiac diet with good PO intake recorded at % of meals. Full nutrition assessment not indicated at this time. RD will monitor per dept policy.

## 2023-06-19 NOTE — THERAPY
OT Note:  Pt has no acute OT needs; at his baseline level with total assist with ADL's and functional mobility. Order canceled. TK

## 2023-06-19 NOTE — CARDIAC REMOTE MONITOR - SCAN
Device transmission reviewed. Device demonstrated appropriate function.       Electronically Signed by: Lex Skinner M.D.    6/20/2023  10:26 AM

## 2023-06-19 NOTE — CARE PLAN
The patient is Stable - Low risk of patient condition declining or worsening    Shift Goals  Clinical Goals: remain afebrile, iv abx, q2 turns  Patient Goals: rest, no fever  Family Goals: no family present    Progress made toward(s) clinical / shift goals:  Antipyretic meds given per patient request. Q2 turns completed.          Problem: Knowledge Deficit - Standard  Goal: Patient and family/care givers will demonstrate understanding of plan of care, disease process/condition, diagnostic tests and medications  Outcome: Progressing     Problem: Hemodynamics  Goal: Patient's hemodynamics, fluid balance and neurologic status will be stable or improve  Outcome: Progressing     Problem: Fluid Volume  Goal: Fluid volume balance will be maintained  Outcome: Progressing     Problem: Urinary - Renal Perfusion  Goal: Ability to achieve and maintain adequate renal perfusion and functioning will improve  Outcome: Progressing     Problem: Respiratory  Goal: Patient will achieve/maintain optimum respiratory ventilation and gas exchange  Outcome: Progressing     Problem: Physical Regulation  Goal: Diagnostic test results will improve  Outcome: Progressing  Goal: Signs and symptoms of infection will decrease  Outcome: Progressing     Problem: Skin Integrity  Goal: Skin integrity is maintained or improved  Outcome: Progressing     Problem: Fall Risk  Goal: Patient will remain free from falls  Outcome: Progressing     Problem: Pain - Standard  Goal: Alleviation of pain or a reduction in pain to the patient’s comfort goal  Outcome: Progressing

## 2023-06-20 ENCOUNTER — APPOINTMENT (OUTPATIENT)
Dept: RADIOLOGY | Facility: MEDICAL CENTER | Age: 73
DRG: 698 | End: 2023-06-20
Attending: FAMILY MEDICINE
Payer: MEDICARE

## 2023-06-20 ENCOUNTER — PATIENT OUTREACH (OUTPATIENT)
Dept: SCHEDULING | Facility: IMAGING CENTER | Age: 73
End: 2023-06-20
Payer: MEDICARE

## 2023-06-20 VITALS
SYSTOLIC BLOOD PRESSURE: 165 MMHG | DIASTOLIC BLOOD PRESSURE: 78 MMHG | BODY MASS INDEX: 32.04 KG/M2 | HEART RATE: 60 BPM | HEIGHT: 70 IN | OXYGEN SATURATION: 92 % | WEIGHT: 223.77 LBS | RESPIRATION RATE: 16 BRPM | TEMPERATURE: 98.3 F

## 2023-06-20 LAB — PROCALCITONIN SERPL-MCNC: 0.43 NG/ML

## 2023-06-20 PROCEDURE — 99239 HOSP IP/OBS DSCHRG MGMT >30: CPT | Performed by: FAMILY MEDICINE

## 2023-06-20 PROCEDURE — 700102 HCHG RX REV CODE 250 W/ 637 OVERRIDE(OP): Performed by: HOSPITALIST

## 2023-06-20 PROCEDURE — A9270 NON-COVERED ITEM OR SERVICE: HCPCS | Performed by: HOSPITALIST

## 2023-06-20 PROCEDURE — 700101 HCHG RX REV CODE 250: Performed by: FAMILY MEDICINE

## 2023-06-20 PROCEDURE — 84145 PROCALCITONIN (PCT): CPT

## 2023-06-20 PROCEDURE — 36415 COLL VENOUS BLD VENIPUNCTURE: CPT

## 2023-06-20 PROCEDURE — 74176 CT ABD & PELVIS W/O CONTRAST: CPT

## 2023-06-20 PROCEDURE — 700105 HCHG RX REV CODE 258: Performed by: INTERNAL MEDICINE

## 2023-06-20 PROCEDURE — 700105 HCHG RX REV CODE 258: Performed by: FAMILY MEDICINE

## 2023-06-20 PROCEDURE — 700111 HCHG RX REV CODE 636 W/ 250 OVERRIDE (IP): Performed by: FAMILY MEDICINE

## 2023-06-20 PROCEDURE — 700111 HCHG RX REV CODE 636 W/ 250 OVERRIDE (IP): Performed by: INTERNAL MEDICINE

## 2023-06-20 RX ORDER — GRANULES FOR ORAL 3 G/1
3 POWDER ORAL ONCE
Status: COMPLETED | OUTPATIENT
Start: 2023-06-20 | End: 2023-06-20

## 2023-06-20 RX ADMIN — MEROPENEM 500 MG: 500 INJECTION, POWDER, FOR SOLUTION INTRAVENOUS at 05:40

## 2023-06-20 RX ADMIN — GRANULES FOR ORAL SOLUTION 3 G: 3 POWDER ORAL at 17:11

## 2023-06-20 RX ADMIN — BACLOFEN 20 MG: 10 TABLET ORAL at 05:40

## 2023-06-20 RX ADMIN — ASPIRIN 81 MG: 81 TABLET, COATED ORAL at 05:40

## 2023-06-20 RX ADMIN — SENNOSIDES AND DOCUSATE SODIUM 2 TABLET: 50; 8.6 TABLET ORAL at 05:40

## 2023-06-20 RX ADMIN — PIPERACILLIN AND TAZOBACTAM 4.5 G: 4; .5 INJECTION, POWDER, FOR SOLUTION INTRAVENOUS at 14:50

## 2023-06-20 RX ADMIN — BACLOFEN 20 MG: 10 TABLET ORAL at 12:32

## 2023-06-20 ASSESSMENT — FIBROSIS 4 INDEX: FIB4 SCORE: 2.93

## 2023-06-20 ASSESSMENT — PAIN DESCRIPTION - PAIN TYPE: TYPE: ACUTE PAIN

## 2023-06-20 NOTE — DOCUMENTATION QUERY
[facility]                                                                       Query Response Note      PATIENT:               NICHOLAS CASEY  ACCT #:                  [id number]  MRN:                     [id number]  :                      1950  ADMIT DATE:       2023 8:53 PM  DISCH DATE:          RESPONDING  PROVIDER #:        [id number]           QUERY TEXT:    Please clarify the relationship, if any, between the suprapubic catheter and UTI.      The patient's Clinical Indicators include:  UA - Bacteria - many, Budding yeast - present, Hyphae yeast - present    ED note - partial quadriplegia and has a suprapubic catheter which was changed  fairly recently and colostomy...Suprapubic catheter site is clean, dry, intact without significant exudate, mild erythema...Final diagnosis Acute cystitis  H&P - History ESBL, started on Meropenem...Dx UTI    Treatment - IV Meropenem; IV LR bolus x1; PO Diflucan 150mg; PO Monurol    Risk factors - UTI, suprapubic catheter, colostomy    Thank you,  Magdalena Abdalla BSN  Clinical   Connect via [employer]  Options provided:   -- Condition UTI due to bacteria with yeast present is due to or associated with  suprapubic catheter]  [[Condition UTI due to bacteria with yeast present is not due to or associated with suprapubic catheter   -- Other explanation, (please specify the other explanation)   -- Unable to determine      Query created by: Magdalena Abdalla on 2023 8:35 AM    RESPONSE TEXT:    Condition UTI due to bacteria with yeast present is due to or associated with suprapubic catheter] [[Condition UTI due to bacteria with yeast present is not due to or associated with suprapubic catheter       QUERY TEXT:    Based on the wound RN note and other clinical indicators documented, do you agree with the wound RN assessment of Pressure Injury Stage IV Coccyx, L heel,  and L foot, present on admission?      The patient's clinical indicators include:  ED note - known stage IV sacrococcygeal pressure ulcer  6/18 Wound RN note - 06/18/23 Pressure Injury Coccyx Stage IV (Active); 06/18/23 Pressure Injury Heel Left Resolving stage IV POA (Active); 06/18/23 Pressure Injury Foot Lateral Left POA resolving stage IV (Active)    Treatment - wound consult; non excisional debridement with cleansing; dressing change    Risk factors -  Incomplete quadriplegia, HTN    Thank you,  Magdalena Abdalla BSN  Clinical   Connect via [employer]  Options provided:   -- Agree with wound RN assessment of Pressure Injury Stage IV Coccyx, L heel, and L foot, present on admission   -- Disagree with wound RN assessment of Pressure Injury Stage IV Coccyx, L heel, and L foot, present on admission   -- Other explanation, (please specify the other explanation)   -- Unable to determine      Query created by: Magdalena Abdalla on 6/20/2023 8:35 AM    RESPONSE TEXT:    Agree with wound RN assessment of Pressure Injury Stage IV Coccyx, L heel, and L foot, present on admission          Electronically signed by:  KATHRYN RICEHY MD 6/20/2023 4:21 PM

## 2023-06-20 NOTE — FACE TO FACE
Face to Face Supporting Documentation - Home Health    The encounter with this patient was in whole or in part the primary reason for home health admission.    Date of encounter:   Patient:                    MRN:                       YOB: 2023  Osvaldo Pate  [id number]  1950     Home health to see patient for:  Skilled Nursing care for assessment, interventions & education, Wound Care, Physical Therapy evaluation and treatment, and Occupational therapy evaluation and treatment    Skilled need for:  Recent Deterioration of Health Status UTI, COVID    Skilled nursing interventions to include:  Wound Care    Homebound status evidenced by:  Need the aid of supportive devices such as crutches, canes, wheelchairs or walkers or Require the use of special transportation. Leaving home requires a considerable and taxing effort. There is a normal inability to leave the home.    Community Physician to provide follow up care: WESTON Pretty.     Optional Interventions? No      I certify the face to face encounter for this home health care referral meets the CMS requirements and the encounter/clinical assessment with the patient was, in whole, or in part, for the medical condition(s) listed above, which is the primary reason for home health care. Based on my clinical findings: the service(s) are medically necessary, support the need for home health care, and the homebound criteria are met.  I certify that this patient has had a face to face encounter by myself.  Ileana Johnson M.D. - NPI: [id number]

## 2023-06-20 NOTE — DISCHARGE PLANNING
Addended by: JENIFER DE LEON on: 12/13/2021 10:11 AM     Modules accepted: Orders     Case Management Discharge Planning    Admission Date: 6/17/2023  GMLOS: 3.8  ALOS: 3    6-Clicks ADL Score: 12  6-Clicks Mobility Score: 9  PT and/or OT Eval ordered: No  Post-acute Referrals Ordered: Yes  Post-acute Choice Obtained: Yes  Has referral(s) been sent to post-acute provider:  Yes      Anticipated Discharge Dispo: Discharge Disposition: D/T to home under HHA care in anticipation of covered skilled care (06)    DME Needed: No    Action(s) Taken: Choice form completed for [facility] per resumption of care and faxed to D[facility].    Escalations Completed: None    Medically Clear: No    Next Steps:  f/u with medical team to discuss DC needs and barriers    Barriers to Discharge: Medical clearance

## 2023-06-20 NOTE — CARE PLAN
The patient is Stable - Low risk of patient condition declining or worsening    Shift Goals  Clinical Goals: IV ABx  Patient Goals: Comfort  Family Goals: no family present    Progress made toward(s) clinical / shift goals:    Problem: Knowledge Deficit - Standard  Goal: Patient and family/care givers will demonstrate understanding of plan of care, disease process/condition, diagnostic tests and medications  Outcome: Progressing     Problem: Skin Integrity  Goal: Skin integrity is maintained or improved  Outcome: Progressing     Problem: Fall Risk  Goal: Patient will remain free from falls  Outcome: Progressing       Patient is not progressing towards the following goals:

## 2023-06-20 NOTE — DISCHARGE INSTRUCTIONS
Diet: Diet Order Diet: Cardiac  Activity: As tolerated  Follow Up: Please follow up with your PCP closely in one week; I discussed your infection with [facility], they would like to see you sooner than your current appointment, and will be contacting you with an appointment in the next week. I have referred you to Infectious Disease to follow your chronic sacrum infection and recurrent UTIs as well, they will call you with an appt. I discussed your infection with Infectious Disease who are recommending a one time dose of Fosfomycin which covers urinary tract infections very well. If you develop recurrent fever, malodorous urine, or pus surrounding your suprapubic catheter after discharge, please present immediately back to the ER. Please continue to follow as you are with wound care.  Disposition: Home with home health  Diagnosis: Acute UTI    Follow up with your Primary Care Provider WESTON Pretty. as scheduled or sooner if your symptoms persist or worsen.  Return to Emergency Room for severe chest pain, shortness of breath, signs of a stroke, or any other emergencies.

## 2023-06-20 NOTE — DISCHARGE PLANNING
Received Choice form at: [id number]  Agency/Facility name: [facility]  Referral sent per Choice form at: [phone]

## 2023-06-20 NOTE — PROGRESS NOTES
Asked for discharge antibiotics  72 y.o. male C5 incomplete quadriplegia from the C5-7 vertebrae since 3/2019 + suprapubic catheter and colostomy bag. Known to ID from prior admission  Sacral OM treated 6 weeks with IV antibiotics completed in March.   He has prior history of ESBL (4/13/23).  Initial temperature in the emergency department was 102.8 Afebrile since early 6/18  Improved on meropenem  Urine culture and blood cultures neg  Recent COVID  Additional antibiotics will not cure his chronic osteomyelitis, especially in the absence of debridement of any necrotic tissues that will serve as a continued nidus of infection.    Empiric antibiotics and additional prolonged antibiotics are not recommended  Surgical resection is the only curative option    Recommend change suprapubic if not already done  Can give dose fosfomycin at discharge as no upper tract infection

## 2023-06-20 NOTE — CARE PLAN
The patient is Stable - Low risk of patient condition declining or worsening    Shift Goals  Clinical Goals: IV abx, Q2 turns  Patient Goals: discharge  Family Goals: no family present    Progress made toward(s) clinical / shift goals:  Patient has request Q4 turns. IV abx administered.     Problem: Knowledge Deficit - Standard  Goal: Patient and family/care givers will demonstrate understanding of plan of care, disease process/condition, diagnostic tests and medications  Outcome: Progressing     Problem: Hemodynamics  Goal: Patient's hemodynamics, fluid balance and neurologic status will be stable or improve  Outcome: Progressing     Problem: Fluid Volume  Goal: Fluid volume balance will be maintained  Outcome: Progressing     Problem: Urinary - Renal Perfusion  Goal: Ability to achieve and maintain adequate renal perfusion and functioning will improve  Outcome: Progressing     Problem: Respiratory  Goal: Patient will achieve/maintain optimum respiratory ventilation and gas exchange  Outcome: Progressing     Problem: Physical Regulation  Goal: Diagnostic test results will improve  Outcome: Progressing  Goal: Signs and symptoms of infection will decrease  Outcome: Progressing     Problem: Skin Integrity  Goal: Skin integrity is maintained or improved  Outcome: Progressing     Problem: Fall Risk  Goal: Patient will remain free from falls  Outcome: Progressing     Problem: Pain - Standard  Goal: Alleviation of pain or a reduction in pain to the patient’s comfort goal  Outcome: Progressing
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
## 2023-06-21 NOTE — DOCUMENTATION QUERY
[facility]                                                                       Query Response Note      PATIENT:               NICHOLAS CASEY  ACCT #:                  [id number]  MRN:                     [id number]  :                      1950  ADMIT DATE:       2023 8:53 PM  DISCH DATE:        2023 5:35 PM  RESPONDING  PROVIDER #:        [id number]           QUERY TEXT:    Please clarify the relationship, if any, between the suprapubic catheter and UTI.      The patient's Clinical Indicators include:  UA - Bacteria - many, Budding yeast - present, Hyphae yeast - present    ED note - partial quadriplegia and has a suprapubic catheter which was changed  fairly recently and colostomy...Suprapubic catheter site is clean, dry, intact without significant exudate, mild erythema...Final diagnosis Acute cystitis  H&P - History ESBL, started on Meropenem...Dx UTI    Treatment - IV Meropenem; IV LR bolus x1; PO Diflucan 150mg; PO Monurol    Risk factors - UTI, suprapubic catheter, colostomy    Thank you,  Magdalena Abdalla BSN  Clinical   Connect via [employer] Messenger  Options provided:   -- Condition UTI due to bacteria with yeast present is due to or associated with  suprapubic catheter   -- Condition UTI due to bacteria with yeast present is not due to or associated with suprapubic catheter   -- Other explanation, (please specify the other explanation)   -- Unable to determine      Query created by: Magdalena Abdalla on 2023 8:46 AM    RESPONSE TEXT:    Condition UTI due to bacteria with yeast present is due to or associated with suprapubic catheter          Electronically signed by:  KATHRYN RICHEY MD 2023 11:35 AM

## 2023-06-23 ENCOUNTER — OFFICE VISIT (OUTPATIENT)
Dept: WOUND CARE | Facility: MEDICAL CENTER | Age: 73
End: 2023-06-23
Attending: INTERNAL MEDICINE
Payer: MEDICARE

## 2023-06-23 VITALS
DIASTOLIC BLOOD PRESSURE: 76 MMHG | SYSTOLIC BLOOD PRESSURE: 136 MMHG | OXYGEN SATURATION: 95 % | TEMPERATURE: 98.3 F | RESPIRATION RATE: 18 BRPM | HEART RATE: 56 BPM

## 2023-06-23 DIAGNOSIS — G82.54 QUADRIPLEGIA, C5-C7 INCOMPLETE (HCC): Primary | ICD-10-CM

## 2023-06-23 DIAGNOSIS — M86.9 OSTEOMYELITIS OF LEFT LOWER EXTREMITY (HCC): ICD-10-CM

## 2023-06-23 DIAGNOSIS — L89.154 SACRAL DECUBITUS ULCER, STAGE IV (HCC): ICD-10-CM

## 2023-06-23 DIAGNOSIS — L89.894 PRESSURE INJURY OF LEFT FOOT, STAGE 4 (HCC): ICD-10-CM

## 2023-06-23 DIAGNOSIS — L89.623 PRESSURE INJURY OF LEFT HEEL, STAGE 3 (HCC): ICD-10-CM

## 2023-06-23 PROCEDURE — 3075F SYST BP GE 130 - 139MM HG: CPT | Performed by: STUDENT IN AN ORGANIZED HEALTH CARE EDUCATION/TRAINING PROGRAM

## 2023-06-23 PROCEDURE — 11043 DBRDMT MUSC&/FSCA 1ST 20/<: CPT | Performed by: STUDENT IN AN ORGANIZED HEALTH CARE EDUCATION/TRAINING PROGRAM

## 2023-06-23 PROCEDURE — 3078F DIAST BP <80 MM HG: CPT | Performed by: STUDENT IN AN ORGANIZED HEALTH CARE EDUCATION/TRAINING PROGRAM

## 2023-06-23 PROCEDURE — 11042 DBRDMT SUBQ TIS 1ST 20SQCM/<: CPT | Mod: 59 | Performed by: STUDENT IN AN ORGANIZED HEALTH CARE EDUCATION/TRAINING PROGRAM

## 2023-06-23 PROCEDURE — 11043 DBRDMT MUSC&/FSCA 1ST 20/<: CPT

## 2023-06-23 PROCEDURE — 99214 OFFICE O/P EST MOD 30 MIN: CPT | Mod: 25

## 2023-06-23 PROCEDURE — 11042 DBRDMT SUBQ TIS 1ST 20SQCM/<: CPT | Mod: XS

## 2023-06-23 NOTE — PATIENT INSTRUCTIONS
-Keep your wound dressing clean, dry, and intact.    -Change your dressing if it becomes soiled, soaked, or falls off.    -Should you experience any significant changes in your wound(s), such as infection (redness, swelling, localized heat, increased pain, fever > 101 F, chills) or have any questions regarding your home care instructions, please contact the wound center at [phone]. If after hours, contact your primary care physician or go to the hospital emergency room.

## 2023-06-24 NOTE — PROGRESS NOTES
Provider Encounter- Pressure Injury        HISTORY OF PRESENT ILLNESS  Wound History:    START OF CARE IN CLINIC: 6/23/2023 (return to clinic after hospitalization)    REFERRING PROVIDER: Sahara Mendez      WOUNDS- Pressure Injury, Sacrococcygeal, stage IV                                                             Surgical wound dehiscence, left medial lower leg-status post surgical I&D of stage IV pressure injury and           biologic application                    Pressure injury, DTI, left posterior lower leg-first observed in clinic 7/29/2022       Pressure injury stage III L heel - first noted 10/21     Right 2nd toe avulsion - first noted 10/21     Skin tag left posterior ear - first noted 10/21     HISTORY: Patient with history of incomplete quadriplegia referred to [facility] for treatment of a stage IV pressure injury.  He has a history of previous pressure injuries to this area, and underwent muscle flaps in 2019, and then again in 2020.  He was seen in the wound clinic in November 2021 for an ulcer proximal from his current ulcer, and pressure injuries to his left posterior lower leg and left heel.  At that time, it was discovered that the patient had retained VAC foam embedded in the wound bed of the sacral wound.  Attempts were made to get him back to his plastic surgeon, though unsuccessful.  In January he underwent surgical removal of VAC sponge along with excisional debridement of his sacral wound by Dr. Chaves.  After the surgery, his wound went on to heal without incident.   In early April 2022, his home health nurse noted a new sacral ulcer, below the previous ulcer which quickly tripled in size over the following weeks.  The ulcer to his left medial lower leg had also deteriorated, with bone visible at the base..  He was hospitalized from 4/22 until 4/27/2022 and underwent surgery with Dr. Raman on 4/26 for irrigation and debridement of multiple compartments of the left lower extremity, bone  excision, and complex closure of chronic wound using biologic skin substitute.   His sacrococcygeal wound was not surgically addressed during this admission.  He was discharged back to his group home, with home health, and referral to outpatient wound clinic for his sacral wound.  He was instructed to follow-up with his surgeon for his lower leg wound.       Postoperatively, the left medial lower leg incision dehisced.  He was seen by his surgeon at [facility] on 5/11.  The surgeon opted to leave remaining sutures in place, and refer him to the wound clinic for treatment of this wound.   Treatment of this wound was initiated in clinic on 5/12.  During this visit was also noted that his heel DTI had resolved, but that he had a new pressure injury to his left posterior lower extremity.     A new pressure injury was noted to patient's right upper buttock/lower back on 5/20/2022.  Wound was linear in shape, skin discolored but intact.     Abrasion noted to left anterior lower leg.  First observed in clinic on 7/22/2022.  Patient states he bumped his leg into his food tray.     Small DTI noted to patient's left lateral lower leg on 7/29/2022.  Skin intact but discolored.     Large area of deep tissue injury noted to patient's left exterior lower leg.  Patient denied any trauma to this area.  Skin intact.  Wound documented.    1/27/2023: Patient was admitted to [facility] from 1/23-1/25/2023 with gross hematuria. He underwent RICHARD which showed watchman device was in place and he was taken off of Xarelto. While hospitalized wound team was consulted. He was referred back to [facility] and home health upon discharge.    Patient was hospitalized at [facility] for pyelonephritis from 2/26 until 3/2/2023, admitted for fever and general malaise.  He was admitted and initially started on linezolid and meropenem for suspected UTI and history of multidrug-resistant organisms.  Urine cultures were negative. ID was consulted, recommended CT of chest and  "abdomen,which were negative for acute findings. However, he was treated with 5 days total course of antibiotics for suspected UTI, and symptoms completely resolved.  During this admission, the inpatient wound team was consulted for treatment of his sacral and lower leg wounds.  A wound culture was taken from his left heel pressure ulcer, negative.  Once stabilized, he was discharged home and referred back to [facility] to resume treatment of his wounds.    Pertinent Medical History: Incomplete quadriplegia, history of stage IV pressure injuries, history of flap procedures to sacral pressure injuries, osteomyelitis, obesity, colostomy in place   Contributing factors: Immobility and Obesity, impaired sensation    Personal support: Attendant-staff at [facility] and home health nursing    TOBACCO USE:   Former smoker, quit in 1977.  Never used smokeless tobacco    Patient's problem list, allergies, and current medications reviewed and updated in Epic    Interval History:  Interval History thinned 7/29/2022.  Please see previous notes for complete interval history.     Interval History thinned 1/27/2023. Please see previous note for complete interval history.    Interval History thinned 3/3/2023.  Please see previous notes for complete interval history.      3/3/2023 : Clinic visit with ADILSON Arthur, FNPEGGY-BC, CWDINESHN, CFTRACI.   Patient returns to clinic after hospitalization for UTI.  He states that overall he is feeling well, denies fevers, chills, nausea, vomiting, cough or shortness of breath.    Per last wound clinic note, a loose bone fragment was extracted from the wound bed of his left medial leg wound and sent for pathology.  Histology report described, \"extensively degenerated fibrocartilaginous tissue\", suggestive of adjacent osteomyelitis.  X-ray of tib-fib ordered to assess for OM.    3/10/2023: Clinic visit with Steve Hodges MD. Patient reports feeling in normal state of health. He obtained Xray left tib-fib, " we do not have images, but report states no evidence of acute pathology such as OM. We discussed options for more aggressive treatment, patient would prefer to watch and wait. Left heel remains open since hospitalization despite using Prevalon boots, will prescribe Prafo boot for offloading. Sacrum measures slightly smaller, does not appear significantly changed.    3/24/2023: Clinic visit with Steve Hodges MD. Patient reports feeling well. He obtained PRAFO boot and left heel wound smaller. Rest of wounds are not significantly changed. New linear friction wound buttocks. Patient reports occurred when transitioning from laying to sitting with his low airloss mattress.    3/31/2023 : Clinic visit with Kelly Sandy, ADILSON, FNP-BC, CWDINESHN, CFCN.   Anjum presents today complaining of cold symptoms.  States he has had an upper respiratory illness for a couple of weeks now but feels he is getting better.  He does have a new tender area to his plantar foot with mild discoloration, possibly caused by seam on insole to PRAFO boot.  He has stopped wearing the PRAFO and is now wearing Prevalon boot, and feels this is improving.  The left medial lower leg wound does show some improvement, with decrease in area.  Sacral wound is about the same.  The left heel ulcer is again almost healed, with thin layer of epithelium noted to wound bed, scant drainage.    4/7/2023: Clinic visit with Steve Hodges MD. Patient reports doing well. Excited for seeing family for Lamar and SO is coming to visit. Patient denies any symptoms of infection. His left medial leg wound is covered with thin, fragile epithelium and boggy due to poor quality of underlying tissue rather than abscess or fluid collection. Left plantar foot has opened slightly but appears improving, left heel is smaller. He continues to use Prevalon boots. Patient reports new abrasion to lateral right knee from rubbing on the dashboard, does not appear open. Home health has  been applying foam dressing for padding.    4/21/2023: Clinic visit with Steve Hodges MD. Patient reports feeling well, denies any symptoms of infection. He has been using Prevalon boots 24 hours a day, despite this, he has worsening left heel pressure injury. We discussed other methods and he will try to use pillow under leg to offload heel. We also applied an allyven dressing to plantar foot to reduce friction and pressure and he can try to use PRAFO boot. His heel does not appear to be in contact with any solid surface on his wheelchair. Other wounds are unchanged. Due to worsening left heel wound, will bring back to clinic next week for evaluation.    4/28/2023: Clinic visit with Steve Hodges MD. Patient reports feeling well, denies any fevers or chills. He has been using pillow under leg to completely offload heel at night and both heel / plantar foot wound improved. He reports with the nice weather he has been spending majority of his day up in chair outside. Unfortunately his sacral wound larger and bone palpable. He is unsure if he would want to be evaluated again by surgery for possible flap.    5/5/2023: Clinic visit with Steve Hodges MD. Patient denies any complaints today. He reports that he has spent less time in wheelchair in effort to improve offloading. Patients lower extremity wounds are fairly stable. Sacral wound appears stable with small partial thickness linear wound superior. He does not recall any specific injury.    5/12/2023 : Clinic visit with ADILSON Arthur, HOLDEN, IMAN, LILIYA.   Anjum continues to feel well overall.  His wounds continue to wax and wane.  He states that his low-air-loss bed was malfunctioning, was bottoming out, has since been repaired.  He continues to spend much of his day up in his wheelchair.    5/19/2023 : Clinic visit with ADILSON Arthur, HOLDEN, IMAN, LILIYA.   Anjum states he is feeling very well today.  Sacral wound about the same, lateral leg wound  continues to wax and wane.  Heel wound is improving steadily.  Plantar foot wound slowly progressing.    5/26/2023 : Clinic visit with ADILSON Arthur, HOLDEN, LILIYA VALERIO.   Continues to feel well.  His heel ulcer is healed, though covered with fragile epithelium.  Medial leg wound has deteriorated slightly.  Sacral and plantar foot wounds are both improved.    6/2/2023 : Clinic visit with ADILSON Arthur, HOLDEN, GEETHA VALERIO   Anjum informed us prior to today's visit that he tested positive for COVID earlier this week.  His symptoms have resolved, and he is wearing an N95 mask today, as am I and the wound nurse.  He states that today he is feeling well, denies fevers, chills, nausea, vomiting, cough or shortness of breath.    Unfortunately, his left medial lower leg wound has deteriorated significantly.  This has happened before, underlying osteomyelitis is suspected, however he does not wish to undergo amputation, nor does his surgeon wish to do so.  Tissue quality of this wound has been poor for some time.   His sacral wound is slowly progressing.  Plantar foot wound is also slowly getting better.   His left heel ulcer, previously resolved, presents today with discoloration.  Suspect DTI.  Will need to be monitored    6/9/2023: Clinic visit with Steve Hodges MD. Anjum reports feeling better, has completely recovered from COVID and denies any complaints. His left leg wounds worsening with new left posterior leg wound, possibly related to the suspected OM. His plantar foot wound with fascia or tendon exposed in base. He does not want any surgical intervention such as amputation. Sacral wound is stable.    6/16/2023 : Clinic visit with ADILSON Arthur, HOLDEN, GEETHA VALERIO   Anjum states he is feeling very well.  His posterior leg wounds have again healed, though covered with thin epithelium.  Medial leg wound and plantar foot wound have both improved.  Sacral wound about the same.    6/23/2023: Clinic  visit with Steve Hodges MD. Patient returns to clinic following recent hospitalization for fever. He was treated with Abx and suprapubic catheter was exchanged. His left leg wounds are improved. Sacrum measuring larger.      REVIEW OF SYSTEMS:   Unchanged from previous wound clinic assessment on 6/16/2023, except as noted in interval history above     PHYSICAL EXAMINATION:   /76   Pulse (!) 56   Temp 36.8 °C (98.3 °F) (Temporal)   Resp 18   SpO2 95%   Physical Exam  Constitutional:       Appearance: He is obese.   Cardiovascular:      Rate and Rhythm: Normal rate.   Pulmonary:      Effort: Pulmonary effort is normal.   Abdominal:      Comments: Colostomy left lower quadrant   Genitourinary:     Comments: Suprapubic catheter  Skin:     Comments: Stage IV coccygeal pressure injury - Wound larger. There is still tissue over majority of bone but appears to be thinner with less granulation tissue, moderate serosanguineous drainage, no evidence of infection    Full-thickness wound to left medial lower leg - Improving, tissue quality remains poor, moderate serosanguineous drainage, no evidence of infection  Full thickness wound to left posterior lower leg -nearly resolved, though periwound remains darkened.  No drainage.  No evidence of infection    Stage III pressure injury left posterior heel - Has resolved, thin fragile epithelium at risk of breaking down.    Stage IV pressure injury plantar foot - Improving, measuring smaller, minimal drainage, wound bed without slough, no evidence of infection.      Refer to wound flowsheet and photos   Neurological:      Mental Status: He is alert and oriented to person, place, and time.   Psychiatric:         Mood and Affect: Mood normal.         WOUND ASSESSMENT  Wound 06/18/23 Pressure Injury Coccyx Stage IV (Active)   Wound Image    06/23/23 1400   Site Assessment Red;Yellow 06/23/23 1400   Periwound Assessment Fragile;Scar tissue 06/23/23 1400   Margins Unattached  edges 06/23/23 1400   Drainage Amount Large 06/23/23 1400   Drainage Description Serosanguineous 06/23/23 1400   Treatments Cleansed;Provider debridement 06/23/23 1400   Wound Cleansing Normal Saline Irrigation 06/23/23 1400   Periwound Protectant Skin Protectant Wipes to Periwound;Barrier Paste 06/23/23 1400   Dressing Cleansing/Solutions Not Applicable 06/23/23 1400   Dressing Options Hydrofiber Silver Strip;Hydrofiber Silver;Other (Comments) 06/23/23 1400   Dressing Changed Changed 06/23/23 1400   Dressing Change/Treatment Frequency Monday, Wednesday, Friday, and As Needed 03/31/23 1527   WOUND NURSE ONLY - Pressure Injury Stage 4 06/23/23 1400   Wound Length (cm) 3.8 cm 06/23/23 1400   Wound Width (cm) 1.8 cm 06/23/23 1400   Wound Depth (cm) 1.5 cm 06/23/23 1400   Wound Surface Area (cm^2) 6.84 cm^2 06/23/23 1400   Wound Volume (cm^3) 10.26 cm^3 06/23/23 1400   Post-Procedure Length (cm) 3.8 cm 06/23/23 1400   Post-Procedure Width (cm) 1.9 cm 06/23/23 1400   Post-Procedure Depth (cm) 1.6 cm 06/23/23 1400   Post-Procedure Surface Area (cm^2) 7.22 cm^2 06/23/23 1400   Post-Procedure Volume (cm^3) 11.552 cm^3 06/23/23 1400   Wound Healing % -182 06/23/23 1400   Tunneling (cm) 0 cm 06/23/23 1400   Tunneling Clock Position of Wound 12 05/19/23 1400   Undermining (cm) 2.9 cm 06/23/23 1400   Undermining of Wound, 1st Location From 8 o'clock;To 2 o'clock 06/23/23 1400   Undermining (cm) - 2nd location 0.6 cm 06/02/23 1600   Undermining of Wound, 2nd Location From 10 o'clock;To 11 o'clock 06/02/23 1600   Wound Odor None 06/23/23 1400   Exposed Structures Bone 06/23/23 1400   Number of days: 5       Wound 06/18/23 Pressure Injury Heel Left Resolving stage IV POA (Active)   Wound Image   06/23/23 1400   Site Assessment Fragile;Epithelialization 06/23/23 1400   Periwound Assessment Denuded;Blanchable erythema;Fragile;Scar tissue 06/23/23 1400   Margins Attached edges 06/09/23 1400   Drainage Amount Scant 06/23/23 1400    Drainage Description Serosanguineous 06/23/23 1400   Treatments Cleansed;Site care 06/23/23 1400   Wound Cleansing Foam Cleanser/Washcloth 06/23/23 1400   Periwound Protectant Barrier Paste 06/23/23 1400   Dressing Cleansing/Solutions Not Applicable 06/23/23 1400   Dressing Options Hydrofiber Silver;Other (Comments);Tubigrip 06/23/23 1400   Dressing Changed New 06/23/23 1400   Dressing Change/Treatment Frequency Monday, Wednesday, Friday, and As Needed 06/23/23 1400   WOUND NURSE ONLY - Pressure Injury Stage 3 06/23/23 1400   Wound Length (cm) 3.8 cm 06/09/23 1400   Wound Width (cm) 1 cm 06/09/23 1400   Wound Depth (cm) 0.1 cm 06/09/23 1400   Wound Surface Area (cm^2) 3.8 cm^2 06/09/23 1400   Wound Volume (cm^3) 0.38 cm^3 06/09/23 1400   Post-Procedure Length (cm) 0.7 cm 05/19/23 1400   Post-Procedure Width (cm) 0.3 cm 05/19/23 1400   Post-Procedure Depth (cm) 0 cm 05/19/23 1400   Post-Procedure Surface Area (cm^2) 0.21 cm^2 05/19/23 1400   Post-Procedure Volume (cm^3) 0 cm^3 05/19/23 1400   Wound Healing % 62 06/09/23 1400   Tunneling (cm) 0 cm 06/23/23 1400   Undermining (cm) 0 cm 06/23/23 1400   Wound Odor None 06/23/23 1400   Exposed Structures None 06/23/23 1400   Number of days: 5       Wound 06/18/23 Full Thickness Wound Leg Medial Left (Active)   Wound Image    06/23/23 1400   Site Assessment Red;Yellow;Boggy 06/23/23 1400   Periwound Assessment Scar tissue;Fragile;Hyperpigmented 06/23/23 1400   Margins Unattached edges 06/23/23 1400   Drainage Amount Moderate 06/23/23 1400   Drainage Description Serosanguineous 06/23/23 1400   Treatments Cleansed;Provider debridement 06/23/23 1400   Wound Cleansing Foam Cleanser/Washcloth 06/23/23 1400   Periwound Protectant Skin Protectant Wipes to Periwound;Barrier Paste 06/23/23 1400   Dressing Cleansing/Solutions Not Applicable 06/23/23 1400   Dressing Options Hydrofiber Silver;Other (Comments);Tubigrip 06/23/23 1400   Dressing Changed Changed 06/23/23 1400   Dressing  Change/Treatment Frequency Monday, Wednesday, Friday, and As Needed 03/31/23 1527   Non-staged Wound Description Full thickness 06/23/23 1400   Wound Length (cm) 1.4 cm 06/23/23 1400   Wound Width (cm) 2.9 cm 06/23/23 1400   Wound Depth (cm) 0.8 cm 06/23/23 1400   Wound Surface Area (cm^2) 4.06 cm^2 06/23/23 1400   Wound Volume (cm^3) 3.248 cm^3 06/23/23 1400   Post-Procedure Length (cm) 1.5 cm 06/23/23 1400   Post-Procedure Width (cm) 2.9 cm 06/23/23 1400   Post-Procedure Depth (cm) 0.8 cm 06/23/23 1400   Post-Procedure Surface Area (cm^2) 4.35 cm^2 06/23/23 1400   Post-Procedure Volume (cm^3) 3.48 cm^3 06/23/23 1400   Wound Healing % -983 06/23/23 1400   Tunneling (cm) 0 cm 06/23/23 1400   Undermining (cm) 0 cm 06/23/23 1400   Undermining of Wound, 1st Location From 1 o'clock;To 2 o'clock 06/02/23 1600   Undermining (cm) - 2nd location 1.3 cm 06/02/23 1600   Undermining of Wound, 2nd Location From 3 o'clock;To 4 o'clock 06/02/23 1600   Wound Odor None 06/23/23 1400   Exposed Structures None 06/23/23 1400   Number of days: 5       Wound 06/18/23 Pressure Injury Foot Lateral Left POA resolving stage IV (Active)   Wound Image    06/23/23 1400   Site Assessment Red 06/23/23 1400   Periwound Assessment Scar tissue;Callused 06/23/23 1400   Margins Unattached edges 06/23/23 1400   Drainage Amount Moderate 06/23/23 1400   Drainage Description Serosanguineous 06/23/23 1400   Treatments Cleansed;Provider debridement 06/23/23 1400   Wound Cleansing Foam Cleanser/Washcloth 06/23/23 1400   Periwound Protectant Skin Protectant Wipes to Periwound;Barrier Paste 06/23/23 1400   Dressing Cleansing/Solutions Not Applicable 06/23/23 1400   Dressing Options Hydrofiber Silver;Silicone Adhesive Foam;Tubigrip 06/23/23 1400   Dressing Changed Changed 06/23/23 1400   Dressing Change/Treatment Frequency Every 72 hrs, and As Needed 06/23/23 1400   WOUND NURSE ONLY - Pressure Injury Stage 4 06/23/23 1400   Wound Length (cm) 0.3 cm 06/23/23  1400   Wound Width (cm) 0.3 cm 06/23/23 1400   Wound Depth (cm) 0.1 cm 06/23/23 1400   Wound Surface Area (cm^2) 0.09 cm^2 06/23/23 1400   Wound Volume (cm^3) 0.009 cm^3 06/23/23 1400   Post-Procedure Length (cm) 0.3 cm 06/23/23 1400   Post-Procedure Width (cm) 0.4 cm 06/23/23 1400   Post-Procedure Depth (cm) 0.2 cm 06/23/23 1400   Post-Procedure Surface Area (cm^2) 0.12 cm^2 06/23/23 1400   Post-Procedure Volume (cm^3) 0.024 cm^3 06/23/23 1400   Wound Healing % 83 06/23/23 1400   Tunneling (cm) 0 cm 06/23/23 1400   Undermining (cm) 0 cm 06/23/23 1400   Wound Odor None 06/23/23 1400   Exposed Structures None 06/23/23 1400   Number of days: 5       Wound 06/18/23 Full Thickness Wound Leg Posterior Left (Active)   Wound Image   06/23/23 1400   Site Assessment Brown 06/23/23 1400   Periwound Assessment Fragile;Hyperpigmented 06/23/23 1400   Margins Attached edges 06/23/23 1400   Drainage Amount Scant 06/23/23 1400   Drainage Description Serosanguineous 06/23/23 1400   Treatments Cleansed;Site care 06/23/23 1400   Wound Cleansing Foam Cleanser/Washcloth 06/23/23 1400   Periwound Protectant Skin Protectant Wipes to Periwound;Barrier Paste 06/23/23 1400   Dressing Cleansing/Solutions Not Applicable 06/23/23 1400   Dressing Options Other (Comments);Tubigrip 06/23/23 1400   Dressing Changed Changed 06/23/23 1400   Dressing Change/Treatment Frequency Every 72 hrs, and As Needed 06/23/23 1400   Non-staged Wound Description Full thickness 06/23/23 1400   Wound Length (cm) 1.8 cm 06/16/23 1400   Wound Width (cm) 0.8 cm 06/16/23 1400   Wound Depth (cm) 0.1 cm 06/16/23 1400   Wound Surface Area (cm^2) 1.44 cm^2 06/16/23 1400   Wound Volume (cm^3) 0.144 cm^3 06/16/23 1400   Post-Procedure Length (cm) 2 cm 06/09/23 1400   Post-Procedure Width (cm) 2.8 cm 06/09/23 1400   Post-Procedure Depth (cm) 0.2 cm 06/09/23 1400   Post-Procedure Surface Area (cm^2) 5.6 cm^2 06/09/23 1400   Post-Procedure Volume (cm^3) 1.12 cm^3 06/09/23 1400    Wound Healing % 86 06/16/23 1400   Tunneling (cm) 0 cm 06/16/23 1400   Undermining (cm) 0 cm 06/16/23 1400   Wound Odor None 06/23/23 1400   Exposed Structures None 06/23/23 1400   Number of days: 5       PROCEDURE: Excisional debridement of sacral wound  -2% viscous lidocaine applied topically to wound bed for approximately 5 minutes prior to debridement  -Curette used to debride wound bed.  Excisional debridement was performed to remove devitalized tissue until healthy, bleeding tissue was visualized.   Entire surface of wound, 7.22 cm² debrided.  Tissue debrided into the muscle / fascia layer.    -Bleeding controlled with manual pressure.    -Wound care completed by wound RN, refer to flowsheet  -Patient tolerated the procedure well, without c/o pain or discomfort.      PROCEDURE: Excisional debridement of left medial lower leg wound and left lateral foot wound  -2% viscous lidocaine applied topically to wound bed for approximately 5 minutes prior to debridement  -Curette used to debride wound bed.  Excisional debridement was performed to remove devitalized tissue until healthy, bleeding tissue was visualized.   Entire surface of wound, 4.47 cm² debrided.  Tissue debrided into the subcutaneous layer.    -Bleeding controlled with manual pressure.    -Wound care completed by wound RN, refer to flowsheet  -Patient tolerated the procedure well, without c/o pain or discomfort.        BIOLOGIC LOG  5/20/2022-first application.  PRODUCT: EpiCord 2 x 3 cm . PRODUCT #[id number]; EXPIRES: 2026-12-01 5/27/2022- second application.  PRODUCT: EpiCordEX 2 x 3 cm . PRODUCT #[id number]; EXPIRES: 2026-09-01    6/3/2022- third application.  PRODUCT: EpiCordEX 2 x 3 cm . PRODUCT #EX [id number]; EXPIRES: 2026-10-01    6/10/2022- fourth application.  PRODUCT: EpiCordEX 2 x 3 cm . PRODUCT #EX [id number]; EXPIRES: 2026-09-01 6/17/2022- fifth application.  PRODUCT: EpiCordEX 2 x 3 cm . PRODUCT #EX  [id number]; EXPIRES: 2027-02-01 6/24/2022- sixth application.  PRODUCT: EpiCordEX 2 x 3 cm . PRODUCT #EX [id number]; EXPIRES: 2027-02-01 07/01/22: Seventh application.  Product: Epicort Dx 2.0 x 3.0 cm reorder number [id number]; product number [id number]; expires 2027-02-01 7/22/2022- eighth application.  PRODUCT: EpiCord 2 x 3 cm, reorder #[id number]. PRODUCT #[id number]; EXPIRES: 2027-02-01      Pertinent Labs and Diagnostics:    Labs:     A1c: No results found for: HBA1C     [facility] results, 7/1/2022 (under media tab)    CRP 13    ESR 31      IMAGING:     10/3/2022-CT of pelvis with contrast  IMPRESSION:     1.  Posterior soft tissue sacral wound and 1.5 x 3.7 cm abscess.   2.  Progressive destruction of the distal sacrum and proximal coccyx. Sclerosis and irregularity of the distal sacrum consistent with osteomyelitis.   3.  Old wound within the soft tissues posterior to the distal left SI joint with possible fistulous communication.    10/3/2022-CT of tib-fib with and without contrast, with reconstruction  IMPRESSION:     1.  Old fractures of the left tibia and fibula with extensive callus formation and bony bridging as well as extensive dystrophic calcifications. Similar to previous.   2.  Distal medial soft tissue wounds with ill-defined superficial in the soft tissue thickening and fluid collections suggestive of phlegmon. No organized abscess identified.   3.  No definite osteomyelitis.   4.  Arthritic changes.        VASCULAR STUDIES: No results found.    LAST  WOUND CULTURE:   Lab Results   Component Value Date/Time    CULTRSULT  06/17/2023 09:43 PM     No growth after 5 days of incubation.  Blood culture testing and Gram stain, if indicated, are  performed at [facility], [address] [place] [address].  Positive blood cultures are  sent to [facility], [address] [place] [address], for organism identification  and  susceptibility testing.        PATHOLOGY  2/17/2023-bone fragment extracted from left lower extremity wound\\  FINAL DIAGNOSIS:     A. Left leg bone fragment at base of chronic wound:          Extensively degenerated fibrocartilaginous tissue with a rim of           fibrinopurulent debris          Correlate with culture findings            Comment: While no residual intact bone is identified, these           findings are suggestive of adjacent osteomyelitis.      ASSESSMENT AND PLAN:     1. Sacral decubitus ulcer, stage IV ([facility])  Comments: Ulcer first noted in early April 2022 as small open area which quickly enlarged.  This ulcer is present distal from previous sacral ulcer which healed after surgery in January.  Patient has history of flap reconstruction x2 to this area.  CT shows progressive destruction of distal sacrum and proximal coccyx.    6/23/2023: Wound measures larger. Less granulation tissue and small foci of bone exposed.  -Excisional debridement of wound in clinic today, medically necessary to promote wound healing.  -Home health to change dressing 1-2 times per week in between clinic visits  -Patient does spend a lot of time up in his wheelchair, and feels this is necessary for his quality of life  - Patient does not currently have follow up with Dr. Saini. If wound deteriorates or fails to improve can consider re-establishing with Dr. Saini for evaluation for coverage options. Patient does not want to pursue at this time and is happy with current conservative approach even with wound worsening.  - Known OM that has already been treated. No utility of Abx unless gross soft tissue infection which does not appear to be present today.  -Roho cushion in wheelchair, appears to be in good condition.  Patient is well versed on offloading techniques    Wound care: Collagen, Hydrofiber silver strip, hydrofiber silver, Mepilex    2. Postoperative wound dehiscence, subsequent encounter  3. Osteomyelitis  of left lower extremity  Comments: On 4/26/2022 patient underwent irrigation and debridement of multiple compartments of the left lower extremity states for pressure injury, with bone excision, and complex closure of chronic wound using biologic skin substitute.  During postop visit in surgeons office on 5/11, surgical site was noted to be dehisced.  Patient was referred to wound clinic for management.    6/23/2023: Wound area has decreased since last assessment.  Periwound remains darkened and slightly boggy.  -Excisional debridement of wound in clinic today, medically necessary to promote wound healing.  -Patient to return to clinic weekly for assessment and debridement  -Home health to change dressing 1-2 times per week in between clinic visits OM.  -Suspect underlying OM, however patient does not wish to consider surgical options at this time nor does his surgeon wish to do any further surgery to this leg. 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 There is no gross soft tissue infection and repeated Abx treatment without source control is not indicated.  -Patient to be mindful of offloading when supine, should assure that his leg is not rotating medially  Wound care: Hydrofiber silver, silicone adhesive foam, tubigrip    3. Deep tissue injury  4. Pressure injury of left foot, stage IV  Comments: DTI to posterior left lower leg first observed in clinic 7/29/2022.  Patient does not know how it started, had been wearing heel float boot consistently.  Evolved into stage IV pressure injury    6/23/2023: Measuring smaller and appears more superficial.  - Excisional debridement was performed in clinic, medically necessary to promote wound healing.  -Patient to return to clinic weekly for assessment and debridement  -Home health to change dressing 1-2 times per week in between clinic visits     Wound care: Hydrofiber silver, Mepilex, Tubigrip D    5. Pressure injury of left heel, stage 3  ([facility])  Comments: First noted in clinic on 10/21/2022, originally noted to be stage II    6/23/2023: Wound is resolved, very thin and fragile epithelium at risk of reopening  -Monitor each clinic visit, observe for deterioration  - Patient continue wearing heel float boots at all times  - Heel does not appear to contact any solid surfaces while in wheelchair   Wound Care: Heel Mepilex to reduce pressure and friction    6. Quadriplegia, C5-C7, incomplete ([facility])  Comments: Complicating factor.  Impaired mobility and sensation  -Patient is still spending 7-8 hours/day up in his wheelchair, though he does have appropriate offloading cushion, and knows to reposition frequently.  Wears heel float boots bilaterally at all times    My total time spent caring for the patient on the day of the encounter was 20 minutes, reviewing history, assessment, counseling and education, and coordination of care.  This does not include time spent on separately billable procedures/tests.      Please note that this note may have been created using voice recognition software. I have worked with technical experts from [facility] to optimize the interface.  I have made every reasonable attempt to correct obvious errors, but there may be errors of grammar and possibly content that I did not discover before finalizing the note.

## 2023-06-30 ENCOUNTER — OFFICE VISIT (OUTPATIENT)
Dept: WOUND CARE | Facility: MEDICAL CENTER | Age: 73
End: 2023-06-30
Attending: INTERNAL MEDICINE
Payer: MEDICARE

## 2023-06-30 VITALS
OXYGEN SATURATION: 95 % | TEMPERATURE: 98.2 F | RESPIRATION RATE: 18 BRPM | DIASTOLIC BLOOD PRESSURE: 78 MMHG | HEART RATE: 58 BPM | SYSTOLIC BLOOD PRESSURE: 123 MMHG

## 2023-06-30 DIAGNOSIS — L89.890 PRESSURE INJURY OF LEFT LEG, UNSTAGEABLE (HCC): ICD-10-CM

## 2023-06-30 DIAGNOSIS — G82.53 QUADRIPLEGIA, C5-C7, COMPLETE (HCC): ICD-10-CM

## 2023-06-30 DIAGNOSIS — M86.9 OSTEOMYELITIS OF LEFT LOWER EXTREMITY (HCC): ICD-10-CM

## 2023-06-30 DIAGNOSIS — T14.8XXA DEEP TISSUE INJURY: ICD-10-CM

## 2023-06-30 DIAGNOSIS — L89.893 PRESSURE INJURY OF LEFT FOOT, STAGE 3 (HCC): ICD-10-CM

## 2023-06-30 DIAGNOSIS — L89.154 SACRAL DECUBITUS ULCER, STAGE IV (HCC): Primary | ICD-10-CM

## 2023-06-30 DIAGNOSIS — T81.31XD POSTOPERATIVE WOUND DEHISCENCE, SUBSEQUENT ENCOUNTER: ICD-10-CM

## 2023-06-30 PROCEDURE — 3074F SYST BP LT 130 MM HG: CPT | Performed by: NURSE PRACTITIONER

## 2023-06-30 PROCEDURE — 11042 DBRDMT SUBQ TIS 1ST 20SQCM/<: CPT | Mod: 59 | Performed by: NURSE PRACTITIONER

## 2023-06-30 PROCEDURE — 11042 DBRDMT SUBQ TIS 1ST 20SQCM/<: CPT | Mod: XS

## 2023-06-30 PROCEDURE — 99213 OFFICE O/P EST LOW 20 MIN: CPT | Mod: 25 | Performed by: NURSE PRACTITIONER

## 2023-06-30 PROCEDURE — 11043 DBRDMT MUSC&/FSCA 1ST 20/<: CPT | Performed by: NURSE PRACTITIONER

## 2023-06-30 PROCEDURE — 11043 DBRDMT MUSC&/FSCA 1ST 20/<: CPT

## 2023-06-30 PROCEDURE — 3078F DIAST BP <80 MM HG: CPT | Performed by: NURSE PRACTITIONER

## 2023-06-30 PROCEDURE — 99213 OFFICE O/P EST LOW 20 MIN: CPT | Mod: 25

## 2023-06-30 NOTE — PATIENT INSTRUCTIONS
-Keep your wound dressing clean, dry, and intact.    -Change your dressing if it becomes soiled, soaked, or falls off.    -Should you experience any significant changes in your wound(s), such as infection (redness, swelling, localized heat, increased pain, fever > 101 F, chills) or have any questions regarding your home care instructions, please contact the wound center at [phone]. If after hours, contact your primary care physician or go to the hospital emergency room.

## 2023-06-30 NOTE — PROGRESS NOTES
Provider Encounter- Pressure Injury        HISTORY OF PRESENT ILLNESS  Wound History:    START OF CARE IN CLINIC: 6/23/2023 (return to clinic after hospitalization)    REFERRING PROVIDER: Sahara Mendez      WOUNDS- Pressure Injury, Sacrococcygeal, stage IV                                                             Surgical wound dehiscence, left medial lower leg-status post surgical I&D of stage IV pressure injury and           biologic application                    Pressure injury, DTI, left posterior lower leg-first observed in clinic 7/29/2022       Pressure injury stage III L heel - first noted 10/21     Right 2nd toe avulsion - first noted 10/21     Skin tag left posterior ear - first noted 10/21     HISTORY: Patient with history of incomplete quadriplegia referred to [facility] for treatment of a stage IV pressure injury.  He has a history of previous pressure injuries to this area, and underwent muscle flaps in 2019, and then again in 2020.  He was seen in the wound clinic in November 2021 for an ulcer proximal from his current ulcer, and pressure injuries to his left posterior lower leg and left heel.  At that time, it was discovered that the patient had retained VAC foam embedded in the wound bed of the sacral wound.  Attempts were made to get him back to his plastic surgeon, though unsuccessful.  In January he underwent surgical removal of VAC sponge along with excisional debridement of his sacral wound by Dr. Chaves.  After the surgery, his wound went on to heal without incident.   In early April 2022, his home health nurse noted a new sacral ulcer, below the previous ulcer which quickly tripled in size over the following weeks.  The ulcer to his left medial lower leg had also deteriorated, with bone visible at the base..  He was hospitalized from 4/22 until 4/27/2022 and underwent surgery with Dr. Raman on 4/26 for irrigation and debridement of multiple compartments of the left lower extremity, bone  excision, and complex closure of chronic wound using biologic skin substitute.   His sacrococcygeal wound was not surgically addressed during this admission.  He was discharged back to his group home, with home health, and referral to outpatient wound clinic for his sacral wound.  He was instructed to follow-up with his surgeon for his lower leg wound.       Postoperatively, the left medial lower leg incision dehisced.  He was seen by his surgeon at [facility] on 5/11.  The surgeon opted to leave remaining sutures in place, and refer him to the wound clinic for treatment of this wound.   Treatment of this wound was initiated in clinic on 5/12.  During this visit was also noted that his heel DTI had resolved, but that he had a new pressure injury to his left posterior lower extremity.     A new pressure injury was noted to patient's right upper buttock/lower back on 5/20/2022.  Wound was linear in shape, skin discolored but intact.     Abrasion noted to left anterior lower leg.  First observed in clinic on 7/22/2022.  Patient states he bumped his leg into his food tray.     Small DTI noted to patient's left lateral lower leg on 7/29/2022.  Skin intact but discolored.     Large area of deep tissue injury noted to patient's left exterior lower leg.  Patient denied any trauma to this area.  Skin intact.  Wound documented.    1/27/2023: Patient was admitted to [facility] from 1/23-1/25/2023 with gross hematuria. He underwent RICHARD which showed watchman device was in place and he was taken off of Xarelto. While hospitalized wound team was consulted. He was referred back to [facility] and home health upon discharge.    Patient was hospitalized at [facility] for pyelonephritis from 2/26 until 3/2/2023, admitted for fever and general malaise.  He was admitted and initially started on linezolid and meropenem for suspected UTI and history of multidrug-resistant organisms.  Urine cultures were negative. ID was consulted, recommended CT of chest and  "abdomen,which were negative for acute findings. However, he was treated with 5 days total course of antibiotics for suspected UTI, and symptoms completely resolved.  During this admission, the inpatient wound team was consulted for treatment of his sacral and lower leg wounds.  A wound culture was taken from his left heel pressure ulcer, negative.  Once stabilized, he was discharged home and referred back to [facility] to resume treatment of his wounds.    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  Never used smokeless tobacco    Patient's problem list, allergies, and current medications reviewed and updated in Epic    Interval History:  Interval History thinned 7/29/2022.  Please see previous notes for complete interval history.     Interval History thinned 1/27/2023. Please see previous note for complete interval history.    Interval History thinned 3/3/2023.  Please see previous notes for complete interval history.      3/3/2023 : Clinic visit with ADILSON Arthur, FNPEGGY-BC, CWDINESHN, CFTRACI.   Patient returns to clinic after hospitalization for UTI.  He states that overall he is feeling well, denies fevers, chills, nausea, vomiting, cough or shortness of breath.    Per last wound clinic note, a loose bone fragment was extracted from the wound bed of his left medial leg wound and sent for pathology.  Histology report described, \"extensively degenerated fibrocartilaginous tissue\", suggestive of adjacent osteomyelitis.  X-ray of tib-fib ordered to assess for OM.    3/10/2023: Clinic visit with Steve Hodges MD. Patient reports feeling in normal state of health. He obtained Xray left tib-fib, " we do not have images, but report states no evidence of acute pathology such as OM. We discussed options for more aggressive treatment, patient would prefer to watch and wait. Left heel remains open since hospitalization despite using Prevalon boots, will prescribe Prafo boot for offloading. Sacrum measures slightly smaller, does not appear significantly changed.    3/24/2023: Clinic visit with Steve Hodges MD. Patient reports feeling well. He obtained PRAFO boot and left heel wound smaller. Rest of wounds are not significantly changed. New linear friction wound buttocks. Patient reports occurred when transitioning from laying to sitting with his low airloss mattress.    3/31/2023 : Clinic visit with Kelly Sandy, ADILSON, FNP-BC, CWDINESHN, CFCN.   Anjum presents today complaining of cold symptoms.  States he has had an upper respiratory illness for a couple of weeks now but feels he is getting better.  He does have a new tender area to his plantar foot with mild discoloration, possibly caused by seam on insole to PRAFO boot.  He has stopped wearing the PRAFO and is now wearing Prevalon boot, and feels this is improving.  The left medial lower leg wound does show some improvement, with decrease in area.  Sacral wound is about the same.  The left heel ulcer is again almost healed, with thin layer of epithelium noted to wound bed, scant drainage.    4/7/2023: Clinic visit with Steve Hodges MD. Patient reports doing well. Excited for seeing family for [place] and SO is coming to visit. Patient denies any symptoms of infection. His left medial leg wound is covered with thin, fragile epithelium and boggy due to poor quality of underlying tissue rather than abscess or fluid collection. Left plantar foot has opened slightly but appears improving, left heel is smaller. He continues to use Prevalon boots. Patient reports new abrasion to lateral right knee from rubbing on the dashboard, does not appear open. Home health has  been applying foam dressing for padding.    4/21/2023: Clinic visit with Steve Hodges MD. Patient reports feeling well, denies any symptoms of infection. He has been using Prevalon boots 24 hours a day, despite this, he has worsening left heel pressure injury. We discussed other methods and he will try to use pillow under leg to offload heel. We also applied an allyven dressing to plantar foot to reduce friction and pressure and he can try to use PRAFO boot. His heel does not appear to be in contact with any solid surface on his wheelchair. Other wounds are unchanged. Due to worsening left heel wound, will bring back to clinic next week for evaluation.    4/28/2023: Clinic visit with Steve Hodges MD. Patient reports feeling well, denies any fevers or chills. He has been using pillow under leg to completely offload heel at night and both heel / plantar foot wound improved. He reports with the nice weather he has been spending majority of his day up in chair outside. Unfortunately his sacral wound larger and bone palpable. He is unsure if he would want to be evaluated again by surgery for possible flap.    5/5/2023: Clinic visit with Steve Hodges MD. Patient denies any complaints today. He reports that he has spent less time in wheelchair in effort to improve offloading. Patients lower extremity wounds are fairly stable. Sacral wound appears stable with small partial thickness linear wound superior. He does not recall any specific injury.    5/12/2023 : Clinic visit with ADILSON Arthur, HOLDEN, IMAN, LILIYA.   Anjum continues to feel well overall.  His wounds continue to wax and wane.  He states that his low-air-loss bed was malfunctioning, was bottoming out, has since been repaired.  He continues to spend much of his day up in his wheelchair.    5/19/2023 : Clinic visit with ADILSON Arthur, HOLDEN, IMAN, LILIYA.   Anjum states he is feeling very well today.  Sacral wound about the same, lateral leg wound  continues to wax and wane.  Heel wound is improving steadily.  Plantar foot wound slowly progressing.    5/26/2023 : Clinic visit with ADILSON Arthur, HOLDEN, LILIYA VALERIO.   Continues to feel well.  His heel ulcer is healed, though covered with fragile epithelium.  Medial leg wound has deteriorated slightly.  Sacral and plantar foot wounds are both improved.    6/2/2023 : Clinic visit with ADILSON Arthur, HOLDEN, GEETHA VALERIO   Anjum informed us prior to today's visit that he tested positive for COVID earlier this week.  His symptoms have resolved, and he is wearing an N95 mask today, as am I and the wound nurse.  He states that today he is feeling well, denies fevers, chills, nausea, vomiting, cough or shortness of breath.    Unfortunately, his left medial lower leg wound has deteriorated significantly.  This has happened before, underlying osteomyelitis is suspected, however he does not wish to undergo amputation, nor does his surgeon wish to do so.  Tissue quality of this wound has been poor for some time.   His sacral wound is slowly progressing.  Plantar foot wound is also slowly getting better.   His left heel ulcer, previously resolved, presents today with discoloration.  Suspect DTI.  Will need to be monitored    6/9/2023: Clinic visit with Steve Hodges MD. Anjum reports feeling better, has completely recovered from COVID and denies any complaints. His left leg wounds worsening with new left posterior leg wound, possibly related to the suspected OM. His plantar foot wound with fascia or tendon exposed in base. He does not want any surgical intervention such as amputation. Sacral wound is stable.    6/16/2023 : Clinic visit with ADILSON Arthur, HOLDEN, GEETHA VALERIO   Anjum states he is feeling very well.  His posterior leg wounds have again healed, though covered with thin epithelium.  Medial leg wound and plantar foot wound have both improved.  Sacral wound about the same.    6/23/2023: Clinic  visit with Steve Hodges MD. Patient returns to clinic following recent hospitalization for fever. He was treated with Abx and suprapubic catheter was exchanged. His left leg wounds are improved. Sacrum measuring larger.    6/30/2023 : Clinic visit with ADILSON Arthur, FNP-BC, CWDINESHN, CFCN.   Anjum continues to feel well.  He is heel ulcer is healed, and plantar foot wound is nearly resolved.  Unfortunately, he has a reopening of the posterior leg wound.  Sacral wound measures about the same.  Medial leg wound continues to wax and wane with poor tissue quality.      REVIEW OF SYSTEMS:   Unchanged from previous wound clinic assessment on 6/23/2023, except as noted in interval history above     PHYSICAL EXAMINATION:   /78   Pulse (!) 58   Temp 36.8 °C (98.2 °F) (Temporal)   Resp 18   SpO2 95%   Physical Exam  Constitutional:       Appearance: He is obese.   Cardiovascular:      Rate and Rhythm: Normal rate.   Pulmonary:      Effort: Pulmonary effort is normal.   Abdominal:      Comments: Colostomy left lower quadrant   Genitourinary:     Comments: Suprapubic catheter  Skin:     Comments: Stage IV coccygeal pressure injury -wound area about the same, thin layer of tissue over bone, thin layer of slough to wound bed, moderate serosanguineous drainage, no evidence of infection.    Full-thickness wound to left medial lower leg -area has decreased, however quality of epithelium is poor, tissue fragile and discolored.  Minimal to moderate serosanguineous drainage, no evidence of infection    Stage III pressure injury left posterior heel -remains resolved.  Epithelium intact    Stage IV pressure injury plantar foot -wound area has decreased significantly, minimal drainage, wound bed clean, no need for debridement    Stage III pressure injury to posterior left lower leg, recurring-shallow stage III, periwound tissue purple in color, possible hematoma, minimal serosanguineous drainage, no evidence of  infection      Refer to wound flowsheet and photos   Neurological:      Mental Status: He is alert and oriented to person, place, and time.   Psychiatric:         Mood and Affect: Mood normal.         WOUND ASSESSMENT  Wound 06/18/23 Pressure Injury Coccyx Stage IV (Active)   Wound Image   06/30/23 1500   Site Assessment Red;Yellow 06/30/23 1500   Periwound Assessment Fragile;Scar tissue 06/30/23 1500   Margins Unattached edges 06/30/23 1500   Drainage Amount Large 06/30/23 1500   Drainage Description Serosanguineous 06/30/23 1500   Treatments Cleansed;Provider debridement;Site care 06/30/23 1500   Wound Cleansing [place] 06/30/23 1500   Periwound Protectant Skin Protectant Wipes to Periwound;Barrier Paste 06/30/23 1500   Dressing Cleansing/Solutions Not Applicable 06/30/23 1500   Dressing Options Hydrofiber Silver Strip;Hydrofiber Silver;Mepilex 06/30/23 1500   Dressing Changed Changed 06/30/23 1500   Dressing Change/Treatment Frequency Monday, Wednesday, Friday, and As Needed 03/31/23 1527   WOUND NURSE ONLY - Pressure Injury Stage 4 06/30/23 1500   Wound Length (cm) 4.1 cm 06/30/23 1500   Wound Width (cm) 1.8 cm 06/30/23 1500   Wound Depth (cm) 1.2 cm 06/30/23 1500   Wound Surface Area (cm^2) 7.38 cm^2 06/30/23 1500   Wound Volume (cm^3) 8.856 cm^3 06/30/23 1500   Post-Procedure Length (cm) 4.1 cm 06/30/23 1500   Post-Procedure Width (cm) 1.8 cm 06/30/23 1500   Post-Procedure Depth (cm) 1.2 cm 06/30/23 1500   Post-Procedure Surface Area (cm^2) 7.38 cm^2 06/30/23 1500   Post-Procedure Volume (cm^3) 8.856 cm^3 06/30/23 1500   Wound Healing % -143 06/30/23 1500   Tunneling (cm) 0 cm 06/30/23 1500   Tunneling Clock Position of Wound 12 05/19/23 1400   Undermining (cm) 3 cm 06/30/23 1500   Undermining of Wound, 1st Location From 8 o'clock;To 2 o'clock 06/30/23 1500   Undermining (cm) - 2nd location 0.6 cm 06/02/23 1600   Undermining of Wound, 2nd Location From 10 o'clock;To 11 o'clock 06/02/23 1600   Wound Odor  None 06/30/23 1500   Exposed Structures Bone 06/30/23 1500   Number of days: 12       Wound 06/18/23 Pressure Injury Heel Left Resolving stage IV POA (Active)   Wound Image    06/30/23 1500   Site Assessment Fragile;Epithelialization 06/30/23 1500   Periwound Assessment Fragile;Scar tissue 06/30/23 1500   Margins Attached edges 06/09/23 1400   Drainage Amount None 06/30/23 1500   Drainage Description Serosanguineous 06/23/23 1400   Treatments Cleansed;Site care 06/30/23 1500   Wound Cleansing Foam Cleanser/Washcloth 06/30/23 1500   Periwound Protectant Skin Protectant Wipes to Periwound;Barrier Paste 06/30/23 1500   Dressing Cleansing/Solutions Not Applicable 06/30/23 1500   Dressing Options Mepilex Heel;Tubigrip 06/30/23 1500   Dressing Changed New 06/30/23 1500   Dressing Status Clean;Dry;Intact 06/30/23 1500   Dressing Change/Treatment Frequency Monday, Wednesday, Friday, and As Needed 06/23/23 1400   WOUND NURSE ONLY - Pressure Injury Stage 3 06/23/23 1400   Wound Length (cm) 3.8 cm 06/09/23 1400   Wound Width (cm) 1 cm 06/09/23 1400   Wound Depth (cm) 0.1 cm 06/09/23 1400   Wound Surface Area (cm^2) 3.8 cm^2 06/09/23 1400   Wound Volume (cm^3) 0.38 cm^3 06/09/23 1400   Post-Procedure Length (cm) 0.7 cm 05/19/23 1400   Post-Procedure Width (cm) 0.3 cm 05/19/23 1400   Post-Procedure Depth (cm) 0 cm 05/19/23 1400   Post-Procedure Surface Area (cm^2) 0.21 cm^2 05/19/23 1400   Post-Procedure Volume (cm^3) 0 cm^3 05/19/23 1400   Wound Healing % 62 06/09/23 1400   Tunneling (cm) 0 cm 06/30/23 1500   Undermining (cm) 0 cm 06/30/23 1500   Wound Odor None 06/30/23 1500   Exposed Structures None 06/30/23 1500   Number of days: 12       Wound 06/18/23 Full Thickness Wound Leg Medial Left (Active)   Wound Image    06/30/23 1500   Site Assessment Red;Yellow;Boggy 06/30/23 1500   Periwound Assessment Scar tissue;Fragile;Hyperpigmented 06/30/23 1500   Margins Unattached edges 06/30/23 1500   Drainage Amount Moderate 06/30/23  1500   Drainage Description Serosanguineous;Sanguineous 06/30/23 1500   Treatments Cleansed;Provider debridement;Site care 06/30/23 1500   Wound Cleansing Foam Cleanser/Washcloth 06/30/23 1500   Periwound Protectant Skin Protectant Wipes to Periwound;Barrier Paste 06/30/23 1500   Dressing Cleansing/Solutions Not Applicable 06/30/23 1500   Dressing Options Hydrofiber Silver Strip;Hydrofiber Silver;Mepilex;Tubigrip 06/30/23 1500   Dressing Changed Changed 06/30/23 1500   Dressing Change/Treatment Frequency Monday, Wednesday, Friday, and As Needed 03/31/23 1527   Non-staged Wound Description Full thickness 06/30/23 1500   Wound Length (cm) 1.6 cm 06/30/23 1500   Wound Width (cm) 2.6 cm 06/30/23 1500   Wound Depth (cm) 0.7 cm 06/30/23 1500   Wound Surface Area (cm^2) 4.16 cm^2 06/30/23 1500   Wound Volume (cm^3) 2.912 cm^3 06/30/23 1500   Post-Procedure Length (cm) 1.1 cm 06/30/23 1500   Post-Procedure Width (cm) 1.1 cm 06/30/23 1500   Post-Procedure Depth (cm) 0.5 cm 06/30/23 1500   Post-Procedure Surface Area (cm^2) 1.21 cm^2 06/30/23 1500   Post-Procedure Volume (cm^3) 0.605 cm^3 06/30/23 1500   Wound Healing % -871 06/30/23 1500   Tunneling (cm) 0 cm 06/30/23 1500   Undermining (cm) 0 cm 06/30/23 1500   Undermining of Wound, 1st Location From 1 o'clock;To 2 o'clock 06/02/23 1600   Undermining (cm) - 2nd location 1.3 cm 06/02/23 1600   Undermining of Wound, 2nd Location From 3 o'clock;To 4 o'clock 06/02/23 1600   Wound Odor None 06/30/23 1500   Exposed Structures None 06/30/23 1500   Number of days: 12       Wound 06/18/23 Pressure Injury Foot Lateral Left POA resolving stage IV (Active)   Wound Image   06/30/23 1500   Site Assessment Red 06/30/23 1500   Periwound Assessment Scar tissue;Callused 06/30/23 1500   Margins Epibole (rolled edges) 06/30/23 1500   Drainage Amount Moderate 06/30/23 1500   Drainage Description Serosanguineous 06/30/23 1500   Treatments Cleansed;Site care 06/30/23 1500   Wound Cleansing Foam  Cleanser/Washcloth 06/30/23 1500   Periwound Protectant Skin Protectant Wipes to Periwound;Barrier Paste 06/30/23 1500   Dressing Cleansing/Solutions Not Applicable 06/30/23 1500   Dressing Options Hydrofiber Silver;Silicone Adhesive Foam;Tubigrip 06/30/23 1500   Dressing Changed Changed 06/30/23 1500   Dressing Change/Treatment Frequency Every 72 hrs, and As Needed 06/23/23 1400   WOUND NURSE ONLY - Pressure Injury Stage 4 06/30/23 1500   Wound Length (cm) 0.2 cm 06/30/23 1500   Wound Width (cm) 0.6 cm 06/30/23 1500   Wound Depth (cm) 0.1 cm 06/30/23 1500   Wound Surface Area (cm^2) 0.12 cm^2 06/30/23 1500   Wound Volume (cm^3) 0.012 cm^3 06/30/23 1500   Post-Procedure Length (cm) 0.3 cm 06/23/23 1400   Post-Procedure Width (cm) 0.4 cm 06/23/23 1400   Post-Procedure Depth (cm) 0.2 cm 06/23/23 1400   Post-Procedure Surface Area (cm^2) 0.12 cm^2 06/23/23 1400   Post-Procedure Volume (cm^3) 0.024 cm^3 06/23/23 1400   Wound Healing % 78 06/30/23 1500   Tunneling (cm) 0 cm 06/30/23 1500   Undermining (cm) 0 cm 06/30/23 1500   Wound Odor None 06/30/23 1500   Exposed Structures None 06/30/23 1500   Number of days: 12       Wound 06/18/23 Full Thickness Wound Leg Posterior Left (Active)   Wound Image    06/30/23 1500   Site Assessment Red 06/30/23 1500   Periwound Assessment Fragile;Hyperpigmented 06/30/23 1500   Margins Attached edges 06/30/23 1500   Drainage Amount Small 06/30/23 1500   Drainage Description Serosanguineous 06/30/23 1500   Treatments Cleansed;Provider debridement;Site care 06/30/23 1500   Wound Cleansing Foam Cleanser/Washcloth 06/30/23 1500   Periwound Protectant Skin Protectant Wipes to Periwound;Barrier Paste 06/30/23 1500   Dressing Cleansing/Solutions Not Applicable 06/30/23 1500   Dressing Options Hydrofiber Silver;Mepilex;Tubigrip 06/30/23 1500   Dressing Changed Changed 06/30/23 1500   Dressing Change/Treatment Frequency Every 72 hrs, and As Needed 06/23/23 1400   Non-staged Wound Description Full  thickness 06/30/23 1500   Wound Length (cm) 1.6 cm 06/30/23 1500   Wound Width (cm) 1 cm 06/30/23 1500   Wound Depth (cm) 0.1 cm 06/30/23 1500   Wound Surface Area (cm^2) 1.6 cm^2 06/30/23 1500   Wound Volume (cm^3) 0.16 cm^3 06/30/23 1500   Post-Procedure Length (cm) 1.7 cm 06/30/23 1500   Post-Procedure Width (cm) 1.1 cm 06/30/23 1500   Post-Procedure Depth (cm) 0.1 cm 06/30/23 1500   Post-Procedure Surface Area (cm^2) 1.87 cm^2 06/30/23 1500   Post-Procedure Volume (cm^3) 0.187 cm^3 06/30/23 1500   Wound Healing % 84 06/30/23 1500   Tunneling (cm) 0 cm 06/30/23 1500   Undermining (cm) 0 cm 06/30/23 1500   Wound Odor None 06/30/23 1500   Exposed Structures None 06/30/23 1500   Number of days: 12       PROCEDURE: Excisional debridement of sacral wound  -2% viscous lidocaine applied topically to wound bed for approximately 5 minutes prior to debridement  -Curette used to debride wound bed.  Excisional debridement was performed to remove devitalized tissue until healthy, bleeding tissue was visualized.  Total area debrided approximately 7.5 cm².  Tissue debrided into the muscle / fascia layer.    -Bleeding controlled with manual pressure.    -Wound care completed by wound RN, refer to flowsheet  -Patient tolerated the procedure well, without c/o pain or discomfort.      PROCEDURE: Excisional debridement of left medial lower leg wound and left posterior leg wound  -2% viscous lidocaine applied topically to wound bed for approximately 5 minutes prior to debridement  -Curette used to debride wound bed.  Excisional debridement was performed to remove devitalized tissue until healthy, bleeding tissue was visualized.   Entire surface of wound, 3.08 cm² debrided.  Tissue debrided into the subcutaneous layer.    -Bleeding controlled with manual pressure.    -Wound care completed by wound RN, refer to flowsheet  -Patient tolerated the procedure well, without c/o pain or discomfort.        BIOLOGIC LOG  5/20/2022-first  application.  PRODUCT: EpiCord 2 x 3 cm . PRODUCT #[id number]; EXPIRES: 2026-12-01 5/27/2022- second application.  PRODUCT: EpiCordEX 2 x 3 cm . PRODUCT #[id number]; EXPIRES: 2026-09-01    6/3/2022- third application.  PRODUCT: EpiCordEX 2 x 3 cm . PRODUCT #[id number]; EXPIRES: 2026-10-01    6/10/2022- fourth application.  PRODUCT: EpiCordEX 2 x 3 cm . PRODUCT #[id number]; EXPIRES: 2026-09-01 6/17/2022- fifth application.  PRODUCT: EpiCordEX 2 x 3 cm . PRODUCT #[id number]; EXPIRES: 2027-02-01 6/24/2022- sixth application.  PRODUCT: EpiCordEX 2 x 3 cm . PRODUCT #[id number]; EXPIRES: 2027-02-01 07/01/22: Seventh application.  Product: Epicort Dx 2.0 x 3.0 cm reorder number [id number]; product number [id number]; expires 2027-02-01 7/22/2022- eighth application.  PRODUCT: EpiCord 2 x 3 cm, reorder #[id number]. PRODUCT #[id number]; EXPIRES: 2027-02-01      Pertinent Labs and Diagnostics:    Labs:     A1c: No results found for: HBA1C     [facility] results, 7/1/2022 (under media tab)    CRP 13    ESR 31      IMAGING:     10/3/2022-CT of pelvis with contrast  IMPRESSION:     1.  Posterior soft tissue sacral wound and 1.5 x 3.7 cm abscess.   2.  Progressive destruction of the distal sacrum and proximal coccyx. Sclerosis and irregularity of the distal sacrum consistent with osteomyelitis.   3.  Old wound within the soft tissues posterior to the distal left SI joint with possible fistulous communication.    10/3/2022-CT of tib-fib with and without contrast, with reconstruction  IMPRESSION:     1.  Old fractures of the left tibia and fibula with extensive callus formation and bony bridging as well as extensive dystrophic calcifications. Similar to previous.   2.  Distal medial soft tissue wounds with ill-defined superficial in the soft tissue thickening and fluid collections suggestive of phlegmon. No organized abscess identified.   3.  No definite  osteomyelitis.   4.  Arthritic changes.        VASCULAR STUDIES: No results found.    LAST  WOUND CULTURE:   Lab Results   Component Value Date/Time    CULTRSULT  06/17/2023 09:43 PM     No growth after 5 days of incubation.  Blood culture testing and Gram stain, if indicated, are  performed at [facility], [address] [place] [address].  Positive blood cultures are  sent to [facility], [address] [place] [address], for organism identification and  susceptibility testing.        PATHOLOGY  2/17/2023-bone fragment extracted from left lower extremity wound\\  FINAL DIAGNOSIS:     A. Left leg bone fragment at base of chronic wound:          Extensively degenerated fibrocartilaginous tissue with a rim of           fibrinopurulent debris          Correlate with culture findings            Comment: While no residual intact bone is identified, these           findings are suggestive of adjacent osteomyelitis.      ASSESSMENT AND PLAN:     1. Sacral decubitus ulcer, stage IV ([facility])  Comments: Ulcer first noted in early April 2022 as small open area which quickly enlarged.  This ulcer is present distal from previous sacral ulcer which healed after surgery in January.  Patient has history of flap reconstruction x2 to this area.  CT shows progressive destruction of distal sacrum and proximal coccyx.    6/30/2023: Wound measures about the same.  No exposed bone noted at base of wound, however depth probes very close to bone.  -Excisional debridement of wound in clinic today, medically necessary to promote wound healing.  -Home health to change dressing 1-2 times per week in between clinic visits  -Patient does spend a lot of time up in his wheelchair, and feels this is necessary for his quality of life.  He does have an appropriate pressure-relief cushion.  He is very well versed in pressure relieving techniques  - Patient does not currently have follow up with Dr. Saini. If  wound deteriorates or fails to improve can consider re-establishing with Dr. Saini for evaluation for coverage options. Patient does not want to pursue at this time and is happy with current conservative approach even with wound worsening.  - Known OM that has already been treated. No utility of Abx unless gross soft tissue infection which does not appear to be present today.      Wound care: Collagen, Hydrofiber silver strip, hydrofiber silver, Mepilex    2. Postoperative wound dehiscence, subsequent encounter  3. Osteomyelitis of left lower extremity  Comments: On 4/26/2022 patient underwent irrigation and debridement of multiple compartments of the left lower extremity states for pressure injury, with bone excision, and complex closure of chronic wound using biologic skin substitute.  During postop visit in surgeons office on 5/11, surgical site was noted to be dehisced.  Patient was referred to wound clinic for management.    6/30/2023: Wound area has decreased since last assessment, however epithelium is fragile and discolored.  He has developed a reopening of wound to posterior leg, presumably pressure related.  Discoloration of periwound is contiguous with medial wound.  -Excisional debridement of wound in clinic today, medically necessary to promote wound healing.  -Patient to return to clinic weekly for assessment and debridement  -Home health to change dressing 1-2 times per week in between clinic visits OM.  -Suspect underlying OM, however patient does not wish to consider surgical options at this time nor does his surgeon wish to do any further surgery to this leg. 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 There is no gross soft tissue infection and repeated Abx treatment without source control is not indicated.  -Patient to be mindful of offloading when supine, should assure that his leg is not rotating medially  Wound care: Hydrofiber silver, silicone adhesive foam,  tubigrip    3. Deep tissue injury  4. Pressure injury of left foot, stage IV  Comments: DTI to posterior left lower leg first observed in clinic 7/29/2022.  Patient does not know how it started, had been wearing heel float boot consistently.  Evolved into stage IV pressure injury    6/30/2023: Wound area is steadily decreasing, nearly resolved  -No need for debridement of this wound today.  Anticipate full resolution within the next 1 to 2 weeks.  -Patient to return to clinic weekly for assessment and debridement  -Home health to change dressing 1-2 times per week in between clinic visits     Wound care: Hydrofiber silver, Mepilex, Tubigrip D    5. Pressure injury of left heel, stage 3 ([facility])  Comments: First noted in clinic on 10/21/2022, originally noted to be stage II    6/30/2023: Wound remains resolved.  Epithelium intact  -Monitor each clinic visit, observe for deterioration  - Patient continue wearing heel float boots at all times  - Heel does not appear to contact any solid surfaces while in wheelchair   Wound Care: Heel Mepilex to reduce pressure and friction    6. Quadriplegia, C5-C7, incomplete ([facility])  Comments: Complicating factor.  Impaired mobility and sensation  -Patient is still spending 7-8 hours/day up in his wheelchair, though he does have appropriate offloading cushion, and knows to reposition frequently.  Wears heel float boots bilaterally at all times    My total time spent caring for the patient on the day of the encounter was 20 minutes, reviewing history, assessment, counseling and education, and coordination of care.  This does not include time spent on separately billable procedures/tests.      Please note that this note may have been created using voice recognition software. I have worked with technical experts from [employer] to optimize the interface.  I have made every reasonable attempt to correct obvious errors, but there may be errors of grammar and possibly content that I did not  discover before finalizing the note.

## 2023-07-07 ENCOUNTER — OFFICE VISIT (OUTPATIENT)
Dept: WOUND CARE | Facility: MEDICAL CENTER | Age: 73
End: 2023-07-07
Attending: INTERNAL MEDICINE
Payer: MEDICARE

## 2023-07-07 ENCOUNTER — TELEPHONE (OUTPATIENT)
Dept: WOUND CARE | Facility: MEDICAL CENTER | Age: 73
End: 2023-07-07

## 2023-07-07 DIAGNOSIS — L89.154 SACRAL DECUBITUS ULCER, STAGE IV (HCC): ICD-10-CM

## 2023-07-07 DIAGNOSIS — T81.31XD POSTOPERATIVE WOUND DEHISCENCE, SUBSEQUENT ENCOUNTER: ICD-10-CM

## 2023-07-07 NOTE — PROGRESS NOTES
Brief Wound Care Provider Note    Patient was late to clinic, unable to be accommodated due to clinic space and prolonged appointments due to positioning requirements. Will be rescheduled. Clinic staff to reach out to home health to inform them.

## 2023-07-07 NOTE — TELEPHONE ENCOUNTER
Anjum called to advise he would be late, when arrived at 3:10, provider and RN unable to accommodate appointment today. This writer offered to call [facility] to advise that we were unable to see Anjum today. [facility] at this time stated they will not be able to see until 7/10 for services.

## 2023-07-14 ENCOUNTER — OFFICE VISIT (OUTPATIENT)
Dept: WOUND CARE | Facility: MEDICAL CENTER | Age: 73
End: 2023-07-14
Attending: INTERNAL MEDICINE
Payer: MEDICARE

## 2023-07-14 VITALS
RESPIRATION RATE: 20 BRPM | TEMPERATURE: 97 F | OXYGEN SATURATION: 95 % | SYSTOLIC BLOOD PRESSURE: 97 MMHG | HEART RATE: 57 BPM | DIASTOLIC BLOOD PRESSURE: 65 MMHG

## 2023-07-14 DIAGNOSIS — G82.53 QUADRIPLEGIA, C5-C7, COMPLETE (HCC): ICD-10-CM

## 2023-07-14 DIAGNOSIS — M86.9 OSTEOMYELITIS OF LEFT LOWER EXTREMITY (HCC): ICD-10-CM

## 2023-07-14 DIAGNOSIS — L89.154 SACRAL DECUBITUS ULCER, STAGE IV (HCC): Primary | ICD-10-CM

## 2023-07-14 DIAGNOSIS — L89.894 PRESSURE INJURY OF LEFT FOOT, STAGE 4 (HCC): ICD-10-CM

## 2023-07-14 DIAGNOSIS — T81.31XD POSTOPERATIVE WOUND DEHISCENCE, SUBSEQUENT ENCOUNTER: ICD-10-CM

## 2023-07-14 DIAGNOSIS — L89.623 PRESSURE INJURY OF LEFT HEEL, STAGE 3 (HCC): ICD-10-CM

## 2023-07-14 DIAGNOSIS — L89.890 PRESSURE INJURY OF LEFT LEG, UNSTAGEABLE (HCC): ICD-10-CM

## 2023-07-14 PROCEDURE — 11043 DBRDMT MUSC&/FSCA 1ST 20/<: CPT

## 2023-07-14 PROCEDURE — 11042 DBRDMT SUBQ TIS 1ST 20SQCM/<: CPT

## 2023-07-14 PROCEDURE — 11042 DBRDMT SUBQ TIS 1ST 20SQCM/<: CPT | Mod: 59 | Performed by: NURSE PRACTITIONER

## 2023-07-14 PROCEDURE — 11043 DBRDMT MUSC&/FSCA 1ST 20/<: CPT | Performed by: NURSE PRACTITIONER

## 2023-07-14 PROCEDURE — 3078F DIAST BP <80 MM HG: CPT | Performed by: NURSE PRACTITIONER

## 2023-07-14 PROCEDURE — 3074F SYST BP LT 130 MM HG: CPT | Performed by: NURSE PRACTITIONER

## 2023-07-14 NOTE — PATIENT INSTRUCTIONS
-Keep your wound dressing clean, dry, and intact.    -Change your dressing if it becomes soiled, soaked, or falls off.    -Should you experience any significant changes in your wound(s), such as infection (redness, swelling, localized heat, increased pain, fever > 101 F, chills) or have any questions regarding your home care instructions, please contact the wound center at [phone]. If after hours, contact your primary care physician or go to the hospital emergency room.      Post Acute Skilled Nursing Home subsequent visit Note     Date of Service: 1/26/2022  Location seen at: [facility]  Subacute / Skilled Need: Rehabilitation    PCP: José Antonio Osorio MD   Patient Care Team:  José Antonio Osorio MD as PCP - General Rito Mariee CNP as Advanced Care at Home: Clinician (Nurse Practitioner - Family)  Mayi Fuchs RN as Advanced Care at Home: RN (Registered Nurse)  INEZ Vizcarra as Advanced Care at Home: SW (Social Work)  Jayne Bhakta MD as Post Acute Facility Provider: Physician (Family Practice)  Jayne Bhakta MD as Post Acute Facility Provider: APC (Family Practice)  Attending SNF MD: Livia  Seen by Jayne Bhakta MD today    Jose F Randle is a 87 year old male presenting to Post Acute Skilled Nursing for: PT/OT/wound care.   History of Present Illness: Inpatient [facility] 1/7/2022 through 1/21/2022.    87-year-old male with history of hypertension, CHF, A. fib, DVT, A. fib who was admitted to the hospital with diagnosis of fall, Covid, bilateral pulmonary emboli.  His chest x-rays were concerning for bacterial pneumonia as well and he completed a course of CTX/cefuroxime along with Decadron.  In addition he was managed on heparin gtt. then transitioned to Eliquis for his pulmonary emboli.  Because he is at increased risk for repeat pulmonary emboli the consideration was made for placement of IVC filter for prevention.  He will need to be compliant with anticoagulation upon discharge.  There is concerned that he was noncompliant with previous anticoagulation for DVT as a source of his pulmonary emboli.  In addition he noted to have A. fib with RVR.  Cardiology was consulted and patient is currently on metoprolol, Eliquis and digoxin was added as well.  Patient reports that he was discharged from the hospital several months ago doing well in rehab and was expecting home health care to come out and continue rehab.  He states this did not occur and he  remained in his chair at home.  Becoming more more debilitated.  He required friends to come over to assist him with ADLs and developed his own system for managing bowel movements etc.  During the course this time however he developed a sacral decub from sitting for extended periods of time.  This was debrided on 1/10/2022 by surgery.  He also has heel ulcers that are chronic and x-rays were negative for osteomyelitis.  In the hospital a wound VAC was ordered and reportedly placed.  Patient also reports he had prolonged period of nausea and vomiting on a daily basis.  He was diagnosed with small bowel ileus and SBFT showed no obstruction.  Since that time symptoms have finally resolved.  At this time he is actually on the phone attempting to order himself some [employer] sandwich and soup.  Other issues include incidental findings of an abdominal aortic aneurysm measuring 4 cm which will require yearly follow-up and an adrenal incidentaloma which is recommended for outpatient follow-up.  Is now transition to the skilled nursing facility for rehab.    Patient lives alone prior to hospitalization but recognizes this is no longer a safe situation.  His plans are to move when discharged from skilled rehab to live with his daughter who lives near [facility].    He denies any acute issues at this time.  He does have pain over the sacral region manipulated.  He also remains fairly weak and requires much assistance with ADLs.    Patient's daughter is Ms. Randle [phone]    1/26/2022:  Follow-up patient had several episodes of emesis yesterday.  KUB was obtained and he was placed on clear liquids.  KUB was unremarkable and reports that today he feels much better.  He said no more emesis today thus far.  Patient believes it was related to something he ate a was not good.  He is very eager to resume regular diet today and is asking for [employer] chicken or [employer].  In physical therapy he is still  nonambulatory, max assist x2 for bed mobility, and min to dependent for ADLs.   reports having conversations with patient's daughter who will be unable to take patient for least 6 months and he must be more independent.  They will arrange for him to remain long-term care after he completes his skilled rehab.  No other acute issues reported.    HISTORY  Past Medical History:   Diagnosis Date   • Afib (CMS/HCC)    • Benign essential hypertension 3/25/2014   • Cataract       reports that he has quit smoking. He smoked 0.00 packs per day. He has never used smokeless tobacco. He reports that he does not drink alcohol and does not use drugs.  Past Surgical History:   Procedure Laterality Date   • Dental surgery     • Eye surgery       Family History   Problem Relation Age of Onset   • Cirrhosis Father      History     Not marked as reviewed during this visit.          PROBLEM LIST:  Specialty Problems     None           ADVANCE DIRECTIVES:  Jose F Randle     AD Type ([facility]#655)     Value   User Date/Time Recorded    Power of  for Health Care  Mishel Sky 5/20/2021 14:23:10          Alternate Agent Name ([facility]#661)     Value   User Date/Time Recorded    Benja Florentino Sky 5/20/2021 14:23:55          Alternate Agent Number ([facility]#662)     Value   User Date/Time Recorded    [phone]  Mishel Lorenzanaueroa 5/20/2021 14:23:55          Decision Maker ([facility]#650)     Value   User Date/Time Recorded    Self  Mishel Lorenzanaueroa 5/20/2021 14:23:10          Do you have an AD? ([facility]#652)     Value   User Date/Time Recorded    Yes  Mishel Sky 5/20/2021 14:23:10          [facility] Date Received ([facility]#703)     Value   User Date/Time Recorded    5/20/2021  Mishel Swanson 5/20/2021 14:23:10          [facility] ([facility]#702)     Value   User Date/Time Recorded    5/18/2021  Mishel Sky 5/20/2021 14:23:10          [facility] LOCATION OF DOCUMENT ([facility]#[id number])     Value   User Date/Time Recorded    Scanned into Medical  record  Mishel Swanson 5/20/2021 14:23:55          Primary Agent Name ([facility]#[id number])     Value   User Date/Time Recorded    Syd Swanson 5/20/2021 14:23:55    Syd Swanson 5/20/2021 14:23:10          Primary Agent Number ([facility]#[id number])     Value   User Date/Time Recorded    [phone]  Mishel Swanson 5/20/2021 14:23:55              Power of  Status:  Activated  Code Status:  FULL CODE  Goals of Care: Improve and moved to live with daughter  Advanced Directive Forms: POA on file      DEPRESSION SCREENING:  PHQ 2/9 Test Results  0: Not at all  1: Several days  2: More than half the days  3: Nearly every day  Recent Review Flowsheet Data     Date 8/25/2021    Adult PHQ 2 Score 0    Adult PHQ 2 Interpretation No further screening needed    Little interest or pleasure in activity? 0    Feeling down, depressed or hopeless? 0          DEPRESSION ASSESSMENT/PLAN:  Depression screening is negative no further plan needed.    ALLERGIES:  Allergies as of 01/26/2022 - Reviewed 12/01/2021   Allergen Reaction Noted   • Penicillins Other (See Comments) 07/28/2012       CURRENT HOME MEDICATIONS:   Current Outpatient Medications   Medication Sig Dispense Refill   • digoxin (LANOXIN) 0.125 MG tablet 125 mcg daily.     • apixaBAN (ELIQUIS) 5 MG Tab Take 1 tablet by mouth every 12 hours. 180 tablet 3   • metoPROLOL succinate (TOPROL-XL) 25 MG 24 hr tablet Take 1 tablet by mouth daily. (Patient taking differently: Take 50 mg by mouth daily. ) 90 tablet 1   • ferrous sulfate 325 (65 FE) MG tablet Take 1 tablet by mouth daily (with breakfast). 30 tablet 0     No current facility-administered medications for this visit.       CURRENT SNF MEDICATIONS:  Digoxin 125 mcg daily  Eliquis 5 mg every 12 hours  Ferrous sulfate 325 mg daily  Metoprolol succinate ER 50 mg daily  Santyl to sacrum    BASELINE FUNCTIONAL STATUS:  Wheelchair--w/c, recliner confined for 4 months    CURRENT FUNCTIONAL STATUS:  Weight bearing as  tolerated (WBAT) and Wheelchair    DIET:  Consistency: General   Type: regular  Appetite: Normal    REVIEW OF SYSTEMS:  Review of Systems   Constitutional: Positive for activity change.   HENT: Negative.    Eyes: Negative.    Respiratory: Negative.    Cardiovascular: Negative.    Gastrointestinal: Negative.    Skin: Positive for wound.   Neurological: Positive for weakness.   Hematological: Negative.    Psychiatric/Behavioral: Negative.        PHYSICAL ASSESSMENT:  Physical Exam  Vitals and nursing note reviewed.   Constitutional:       General: He is not in acute distress.     Appearance: Normal appearance. He is not ill-appearing.      Comments: In bed   HENT:      Head: Normocephalic.      Nose: Nose normal.      Mouth/Throat:      Mouth: Mucous membranes are moist.      Neck: Normal range of motion and neck supple.   Eyes:      Extraocular Movements: Extraocular movements intact.   Cardiovascular:      Rate and Rhythm: Normal rate and regular rhythm.      Heart sounds: Normal heart sounds.   Pulmonary:      Effort: Pulmonary effort is normal. No respiratory distress.      Breath sounds: Normal breath sounds. No wheezing or rales.   Abdominal:      General: Bowel sounds are normal.      Palpations: Abdomen is soft.      Tenderness: There is no abdominal tenderness. There is no guarding or rebound.   Genitourinary:     Comments: Fischer catheter in place  Musculoskeletal:      Right lower leg: Edema present.      Left lower leg: Edema present.   Skin:     Comments: Multiple wounds including left heel, right heel, sacrum, left lateral foot and left buttocks.  Left heel noted to be 7 x 7 cm pink with 70% hard firm necrotic tissue, sacrum reported at 7 x 3.5 cm and with 50% bright red tissue and 50% slough, right heel reported at that 5 x 5 cm with nonblanchable erythema.  See wound rounds for detailed descriptions and images  Currently with dry dressings in place   Neurological:      Mental Status: He is alert and  oriented to person, place, and time.      Motor: Weakness present.   Psychiatric:         Mood and Affect: Mood normal.         Behavior: Behavior normal.         Thought Content: Thought content normal.         VITALS:  104/62, 97.7, 99, 16, 96% on room air  Accu-Chek 107  Pain Level 0 /10    LABS:    1/20/2022 (Hospital labs):  BUN 11  CR 0.6  K 3.0    1/16/2022 (Hospital labs):  WBC 8.1  Hgb 9.5          ASSESSMENT AND PLAN  1. Vomiting, unspecified vomiting type, unspecified whether nausea present  Now resolved  Discussed appropriate diet with patient and advised to avoid heavy fast food at this time  Patient agreeable to moderate  Continue to monitor and encourage fluids  Follow-up labs    2. 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 Covid infection  Patient currently clinically stable  Monitor    3.  Debility  Chronic with severe functional limitations  Continue PT/OT  We'll need 24-hour care and plan to transition to long-term care once completing skilled services  Goal to ultimately move with daughter if functionally able    4. Wounds  Being followed by wound team and wound physician  Continue management  Pressure relief and nutritional support    5. Atrial fibrillation, unspecified type (CMS/HCC)  Rate controlled  Continue management with Eliquis, metoprolol, digoxin  Follow-up with cardiology    6. Pulmonary emboli  Continue Eliquis and monitor    Jayne Bhakta MD        FOLLOW UP APPOINTMENTS:  No follow-ups on file.    DISCHARGE PLANNING: TBD 7 to 14 days    Discussed with: RN / Nursing, Patient and Reviewed old records    Prognosis: fair    Total time spent is more than 35 minutes, with more than 50% of the time spent in coordination of care, counseling, review of records and discussion of plan of care with the patient /staff /family.

## 2023-07-15 NOTE — PROGRESS NOTES
Provider Encounter- Pressure Injury        HISTORY OF PRESENT ILLNESS  Wound History:    START OF CARE IN CLINIC: 6/23/2023 (return to clinic after hospitalization)    REFERRING PROVIDER: Sahara Mendez      WOUNDS- Pressure Injury, Sacrococcygeal, stage IV                                                             Surgical wound dehiscence, left medial lower leg-status post surgical I&D of stage IV pressure injury and           biologic application                    Pressure injury, DTI, left posterior lower leg-first observed in clinic 7/29/2022       Pressure injury stage III L heel - first noted 10/21     Right 2nd toe avulsion - first noted 10/21     Skin tag left posterior ear - first noted 10/21     HISTORY: Patient with history of incomplete quadriplegia referred to [facility] for treatment of a stage IV pressure injury.  He has a history of previous pressure injuries to this area, and underwent muscle flaps in 2019, and then again in 2020.  He was seen in the wound clinic in November 2021 for an ulcer proximal from his current ulcer, and pressure injuries to his left posterior lower leg and left heel.  At that time, it was discovered that the patient had retained VAC foam embedded in the wound bed of the sacral wound.  Attempts were made to get him back to his plastic surgeon, though unsuccessful.  In January he underwent surgical removal of VAC sponge along with excisional debridement of his sacral wound by Dr. Chaves.  After the surgery, his wound went on to heal without incident.   In early April 2022, his home health nurse noted a new sacral ulcer, below the previous ulcer which quickly tripled in size over the following weeks.  The ulcer to his left medial lower leg had also deteriorated, with bone visible at the base..  He was hospitalized from 4/22 until 4/27/2022 and underwent surgery with Dr. Raman on 4/26 for irrigation and debridement of multiple compartments of the left lower extremity, bone  excision, and complex closure of chronic wound using biologic skin substitute.   His sacrococcygeal wound was not surgically addressed during this admission.  He was discharged back to his group home, with home health, and referral to outpatient wound clinic for his sacral wound.  He was instructed to follow-up with his surgeon for his lower leg wound.       Postoperatively, the left medial lower leg incision dehisced.  He was seen by his surgeon at [facility] on 5/11.  The surgeon opted to leave remaining sutures in place, and refer him to the wound clinic for treatment of this wound.   Treatment of this wound was initiated in clinic on 5/12.  During this visit was also noted that his heel DTI had resolved, but that he had a new pressure injury to his left posterior lower extremity.     A new pressure injury was noted to patient's right upper buttock/lower back on 5/20/2022.  Wound was linear in shape, skin discolored but intact.     Abrasion noted to left anterior lower leg.  First observed in clinic on 7/22/2022.  Patient states he bumped his leg into his food tray.     Small DTI noted to patient's left lateral lower leg on 7/29/2022.  Skin intact but discolored.     Large area of deep tissue injury noted to patient's left exterior lower leg.  Patient denied any trauma to this area.  Skin intact.  Wound documented.    1/27/2023: Patient was admitted to [facility] from 1/23-1/25/2023 with gross hematuria. He underwent RICHARD which showed watchman device was in place and he was taken off of Xarelto. While hospitalized wound team was consulted. He was referred back to [facility] and home health upon discharge.    Patient was hospitalized at [facility] for pyelonephritis from 2/26 until 3/2/2023, admitted for fever and general malaise.  He was admitted and initially started on linezolid and meropenem for suspected UTI and history of multidrug-resistant organisms.  Urine cultures were negative. ID was consulted, recommended CT of chest and  "abdomen,which were negative for acute findings. However, he was treated with 5 days total course of antibiotics for suspected UTI, and symptoms completely resolved.  During this admission, the inpatient wound team was consulted for treatment of his sacral and lower leg wounds.  A wound culture was taken from his left heel pressure ulcer, negative.  Once stabilized, he was discharged home and referred back to [facility] to resume treatment of his wounds.    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  Never used smokeless tobacco    Patient's problem list, allergies, and current medications reviewed and updated in Epic    Interval History:  Interval History thinned 7/29/2022.  Please see previous notes for complete interval history.     Interval History thinned 1/27/2023. Please see previous note for complete interval history.    Interval History thinned 3/3/2023.  Please see previous notes for complete interval history.      3/3/2023 : Clinic visit with ADILSON Arthur, FNPEGGY-BC, CWDINESHN, CFTRACI.   Patient returns to clinic after hospitalization for UTI.  He states that overall he is feeling well, denies fevers, chills, nausea, vomiting, cough or shortness of breath.    Per last wound clinic note, a loose bone fragment was extracted from the wound bed of his left medial leg wound and sent for pathology.  Histology report described, \"extensively degenerated fibrocartilaginous tissue\", suggestive of adjacent osteomyelitis.  X-ray of tib-fib ordered to assess for OM.    3/10/2023: Clinic visit with Steve Hodges MD. Patient reports feeling in normal state of health. He obtained Xray left tib-fib, " we do not have images, but report states no evidence of acute pathology such as OM. We discussed options for more aggressive treatment, patient would prefer to watch and wait. Left heel remains open since hospitalization despite using Prevalon boots, will prescribe Prafo boot for offloading. Sacrum measures slightly smaller, does not appear significantly changed.    3/24/2023: Clinic visit with Steve Hodges MD. Patient reports feeling well. He obtained PRAFO boot and left heel wound smaller. Rest of wounds are not significantly changed. New linear friction wound buttocks. Patient reports occurred when transitioning from laying to sitting with his low airloss mattress.    3/31/2023 : Clinic visit with Kelly Sandy, ADILSON, FNP-BC, CWDINESHN, CFCN.   Anjum presents today complaining of cold symptoms.  States he has had an upper respiratory illness for a couple of weeks now but feels he is getting better.  He does have a new tender area to his plantar foot with mild discoloration, possibly caused by seam on insole to PRAFO boot.  He has stopped wearing the PRAFO and is now wearing Prevalon boot, and feels this is improving.  The left medial lower leg wound does show some improvement, with decrease in area.  Sacral wound is about the same.  The left heel ulcer is again almost healed, with thin layer of epithelium noted to wound bed, scant drainage.    4/7/2023: Clinic visit with Steve Hodges MD. Patient reports doing well. Excited for seeing family for Lam[place] and SO is coming to visit. Patient denies any symptoms of infection. His left medial leg wound is covered with thin, fragile epithelium and boggy due to poor quality of underlying tissue rather than abscess or fluid collection. Left plantar foot has opened slightly but appears improving, left heel is smaller. He continues to use Prevalon boots. Patient reports new abrasion to lateral right knee from rubbing on the dashboard, does not appear open. Home health has  been applying foam dressing for padding.    4/21/2023: Clinic visit with Steve Hodges MD. Patient reports feeling well, denies any symptoms of infection. He has been using Prevalon boots 24 hours a day, despite this, he has worsening left heel pressure injury. We discussed other methods and he will try to use pillow under leg to offload heel. We also applied an allyven dressing to plantar foot to reduce friction and pressure and he can try to use PRAFO boot. His heel does not appear to be in contact with any solid surface on his wheelchair. Other wounds are unchanged. Due to worsening left heel wound, will bring back to clinic next week for evaluation.    4/28/2023: Clinic visit with Steve Hodges MD. Patient reports feeling well, denies any fevers or chills. He has been using pillow under leg to completely offload heel at night and both heel / plantar foot wound improved. He reports with the nice weather he has been spending majority of his day up in chair outside. Unfortunately his sacral wound larger and bone palpable. He is unsure if he would want to be evaluated again by surgery for possible flap.    5/5/2023: Clinic visit with Steve Hodges MD. Patient denies any complaints today. He reports that he has spent less time in wheelchair in effort to improve offloading. Patients lower extremity wounds are fairly stable. Sacral wound appears stable with small partial thickness linear wound superior. He does not recall any specific injury.    5/12/2023 : Clinic visit with ADILSON Arthur, HOLDEN, IMAN, LILIYA.   Anjum continues to feel well overall.  His wounds continue to wax and wane.  He states that his low-air-loss bed was malfunctioning, was bottoming out, has since been repaired.  He continues to spend much of his day up in his wheelchair.    5/19/2023 : Clinic visit with ADILSON Arthur, HOLDEN, IMAN, LILIYA.   Anjum states he is feeling very well today.  Sacral wound about the same, lateral leg wound  continues to wax and wane.  Heel wound is improving steadily.  Plantar foot wound slowly progressing.    5/26/2023 : Clinic visit with ADILSON Arthur, HOLDEN, LILIYA VALERIO.   Continues to feel well.  His heel ulcer is healed, though covered with fragile epithelium.  Medial leg wound has deteriorated slightly.  Sacral and plantar foot wounds are both improved.    6/2/2023 : Clinic visit with ADILSON Arthur, HOLDEN, GEETHA VALERIO   Anjum informed us prior to today's visit that he tested positive for COVID earlier this week.  His symptoms have resolved, and he is wearing an N95 mask today, as am I and the wound nurse.  He states that today he is feeling well, denies fevers, chills, nausea, vomiting, cough or shortness of breath.    Unfortunately, his left medial lower leg wound has deteriorated significantly.  This has happened before, underlying osteomyelitis is suspected, however he does not wish to undergo amputation, nor does his surgeon wish to do so.  Tissue quality of this wound has been poor for some time.   His sacral wound is slowly progressing.  Plantar foot wound is also slowly getting better.   His left heel ulcer, previously resolved, presents today with discoloration.  Suspect DTI.  Will need to be monitored    6/9/2023: Clinic visit with Steve Hodges MD. Anjum reports feeling better, has completely recovered from COVID and denies any complaints. His left leg wounds worsening with new left posterior leg wound, possibly related to the suspected OM. His plantar foot wound with fascia or tendon exposed in base. He does not want any surgical intervention such as amputation. Sacral wound is stable.    6/16/2023 : Clinic visit with ADILSON Arthur, HOLDEN, GEETHA VALERIO   Anjum states he is feeling very well.  His posterior leg wounds have again healed, though covered with thin epithelium.  Medial leg wound and plantar foot wound have both improved.  Sacral wound about the same.    6/23/2023: Clinic  visit with Steve Hodges MD. Patient returns to clinic following recent hospitalization for fever. He was treated with Abx and suprapubic catheter was exchanged. His left leg wounds are improved. Sacrum measuring larger.    6/30/2023 : Clinic visit with ADILSON Arthur, HOLDEN, IMAN, CFTRACI.   Anjum continues to feel well.  He is heel ulcer is healed, and plantar foot wound is nearly resolved.  Unfortunately, he has a reopening of the posterior leg wound.  Sacral wound measures about the same.  Medial leg wound continues to wax and wane with poor tissue quality.    7/14/2023 : Clinic visit with ADILSON Arthur, HOLDEN, IMAN, CFTRACI.   Anjum states he is feeling well today.  He is left heel ulcer remains resolved, though covered with friable epithelium.  Plantar foot ulcer is now healed.  Unfortunately, medial left lower leg ulcers continue to deteriorate, with poor tissue quality.  Posterior wound is also deteriorated.  Sacral ulcer is about the same.  He reiterated that he does not want to consider surgery of any sort at this time.    REVIEW OF SYSTEMS:   Unchanged from previous wound clinic assessment on 6/30/2023, except as noted in interval history above     PHYSICAL EXAMINATION:   BP 97/65   Pulse (!) 57   Temp 36.1 °C (97 °F) (Temporal)   Resp 20   SpO2 95%   Physical Exam  Constitutional:       Appearance: He is obese.   Cardiovascular:      Rate and Rhythm: Normal rate.   Pulmonary:      Effort: Pulmonary effort is normal.   Abdominal:      Comments: Colostomy left lower quadrant   Genitourinary:     Comments: Suprapubic catheter to down drain   Skin:     Comments: Stage IV coccygeal pressure injury -wound area measures larger today, though may be positional, undermining persists, thin layer of tissue over bone, thin layer of slough to wound bed, moderate serosanguineous drainage, periwound is without erythema or induration, there is no wound odor    Full-thickness wound to left medial lower leg  -area of wound has increased significantly since last assessment, poor tissue quality, minimal to moderate serosanguineous drainage, no evidence of infection    Stage III pressure injury left posterior heel -remains resolved.  Epithelium intact though friable    Stage IV pressure injury plantar foot -wound is resolved    Stage III pressure injury to posterior left lower leg, recurring-shallow stage III,  wound area has decreased since last assessment, poor tissue quality, minimal to moderate serosanguineous drainage, no evidence of infection      Refer to wound flowsheet and photos   Neurological:      Mental Status: He is alert and oriented to person, place, and time.   Psychiatric:         Mood and Affect: Mood normal.         WOUND ASSESSMENT  Wound 06/18/23 Pressure Injury Coccyx Stage IV (Active)   Wound Image    07/14/23 1500   Site Assessment Red;Yellow 07/14/23 1500   Periwound Assessment Fragile;Glide 07/14/23 1500   Margins Epibole (rolled edges) 07/14/23 1500   Drainage Amount Moderate 07/14/23 1500   Drainage Description Serosanguineous 07/14/23 1500   Treatments Cleansed;Provider debridement 07/14/23 1500   Wound Cleansing Antimicrobial Wound Irrigation [place] 07/14/23 1500   Periwound Protectant Barrier Paste 07/14/23 1500   Dressing Changed Changed 07/14/23 1500   Dressing Cleansing/Solutions Not Applicable 07/14/23 1500   Dressing Options Hydrofiber Silver Strip;Hydrofiber Silver;Mepilex 07/14/23 1500   Dressing Change/Treatment Frequency Monday, Wednesday, Friday, and As Needed 03/31/23 1527   WOUND NURSE ONLY - Pressure Injury Stage 4 07/14/23 1500   Wound Length (cm) 4 cm 07/14/23 1500   Wound Width (cm) 2 cm 07/14/23 1500   Wound Depth (cm) 1.2 cm 07/14/23 1500   Wound Surface Area (cm^2) 8 cm^2 07/14/23 1500   Wound Volume (cm^3) 9.6 cm^3 07/14/23 1500   Post-Procedure Length (cm) 4.1 cm 07/14/23 1500   Post-Procedure Width (cm) 2 cm 07/14/23 1500   Post-Procedure Depth (cm) 1.3 cm 07/14/23 1500    Post-Procedure Surface Area (cm^2) 8.2 cm^2 07/14/23 1500   Post-Procedure Volume (cm^3) 10.66 cm^3 07/14/23 1500   Wound Healing % -164 07/14/23 1500   Tunneling (cm) 0 cm 06/30/23 1500   Tunneling Clock Position of Wound 12 05/19/23 1400   Undermining (cm) 1.2 cm 07/14/23 1500   Undermining of Wound, 1st Location From 7 o'clock;From 9 o'clock 07/14/23 1500   Undermining (cm) - 2nd location 3.5 cm 07/14/23 1500   Undermining of Wound, 2nd Location From 12 o'clock;To 2 o'clock 07/14/23 1500   Wound Odor None 07/14/23 1500   Exposed Structures Bone 06/30/23 1500   Number of days: 26       Wound 06/18/23 Pressure Injury Heel Left Resolving stage IV POA (Active)   Wound Image   07/14/23 1500   Site Assessment Fragile;Epithelialization 07/14/23 1500   Periwound Assessment Scar tissue;Fragile 07/14/23 1500   Margins Attached edges 06/09/23 1400   Drainage Amount None 07/14/23 1500   Drainage Description Serosanguineous 06/23/23 1400   Treatments Cleansed 07/14/23 1500   Wound Cleansing Foam Cleanser/Washcloth 07/14/23 1500   Periwound Protectant Barrier Paste 07/14/23 1500   Dressing Status Clean;Dry;Intact 06/30/23 1500   Dressing Changed Changed 07/14/23 1500   Dressing Cleansing/Solutions Not Applicable 07/14/23 1500   Dressing Options Mepilex Heel;Tubigrip 07/14/23 1500   Dressing Change/Treatment Frequency Monday, Wednesday, Friday, and As Needed 06/23/23 1400   WOUND NURSE ONLY - Pressure Injury Stage 3 06/23/23 1400   Wound Length (cm) 3.8 cm 06/09/23 1400   Wound Width (cm) 1 cm 06/09/23 1400   Wound Depth (cm) 0.1 cm 06/09/23 1400   Wound Surface Area (cm^2) 3.8 cm^2 06/09/23 1400   Wound Volume (cm^3) 0.38 cm^3 06/09/23 1400   Post-Procedure Length (cm) 0.7 cm 05/19/23 1400   Post-Procedure Width (cm) 0.3 cm 05/19/23 1400   Post-Procedure Depth (cm) 0 cm 05/19/23 1400   Post-Procedure Surface Area (cm^2) 0.21 cm^2 05/19/23 1400   Post-Procedure Volume (cm^3) 0 cm^3 05/19/23 1400   Wound Healing % 62 06/09/23  1400   Tunneling (cm) 0 cm 07/14/23 1500   Undermining (cm) 0 cm 07/14/23 1500   Wound Odor None 07/14/23 1500   Exposed Structures None 07/14/23 1500   Number of days: 26       Wound 06/18/23 Full Thickness Wound Leg Medial Left (Active)   Wound Image    07/14/23 1500   Site Assessment Red;Purple;Bleeding;Boggy 07/14/23 1500   Periwound Assessment Red;Denuded 07/14/23 1500   Margins Unattached edges 07/14/23 1500   Drainage Amount Moderate 07/14/23 1500   Drainage Description Serosanguineous 07/14/23 1500   Treatments Cleansed;Provider debridement 07/14/23 1500   Wound Cleansing Foam Cleanser/Washcloth 07/14/23 1500   Periwound Protectant Barrier Paste 07/14/23 1500   Dressing Changed Changed 07/14/23 1500   Dressing Cleansing/Solutions Not Applicable 07/14/23 1500   Dressing Options Hydrofiber Silver;Mepilex;Tubigrip 07/14/23 1500   Dressing Change/Treatment Frequency Monday, Wednesday, Friday, and As Needed 03/31/23 1527   Non-staged Wound Description Full thickness 07/14/23 1500   Wound Length (cm) 1.6 cm 07/14/23 1500   Wound Width (cm) 5.7 cm 07/14/23 1500   Wound Depth (cm) 0.7 cm 07/14/23 1500   Wound Surface Area (cm^2) 9.12 cm^2 07/14/23 1500   Wound Volume (cm^3) 6.384 cm^3 07/14/23 1500   Post-Procedure Length (cm) 2 cm 07/14/23 1500   Post-Procedure Width (cm) 5.7 cm 07/14/23 1500   Post-Procedure Depth (cm) 0.7 cm 07/14/23 1500   Post-Procedure Surface Area (cm^2) 11.4 cm^2 07/14/23 1500   Post-Procedure Volume (cm^3) 7.98 cm^3 07/14/23 1500   Wound Healing % -2028 07/14/23 1500   Tunneling (cm) 0 cm 07/14/23 1500   Undermining (cm) 0 cm 07/14/23 1500   Undermining of Wound, 1st Location From 1 o'clock;To 2 o'clock 06/02/23 1600   Undermining (cm) - 2nd location 1.3 cm 06/02/23 1600   Undermining of Wound, 2nd Location From 3 o'clock;To 4 o'clock 06/02/23 1600   Wound Odor None 07/14/23 1500   Exposed Structures None 07/14/23 1500   Number of days: 26       Wound 06/18/23 Pressure Injury Foot Lateral  Left POA resolving stage IV (Active)   Wound Image   07/14/23 1500   Site Assessment Scabbed 07/14/23 1500   Periwound Assessment Scar tissue;Callused;Flaky 07/14/23 1500   Margins KEN 07/14/23 1500   Drainage Amount None 07/14/23 1500   Drainage Description Serosanguineous 06/30/23 1500   Treatments Cleansed;Site care 07/14/23 1500   Wound Cleansing Foam Cleanser/Washcloth 07/14/23 1500   Periwound Protectant Barrier Paste 07/14/23 1500   Dressing Changed New 07/14/23 1500   Dressing Cleansing/Solutions Not Applicable 07/14/23 1500   Dressing Options Silicone Adhesive Foam;Tubigrip 07/14/23 1500   Dressing Change/Treatment Frequency Every 72 hrs, and As Needed 06/23/23 1400   WOUND NURSE ONLY - Pressure Injury Stage 4 06/30/23 1500   Wound Length (cm) 0.2 cm 06/30/23 1500   Wound Width (cm) 0.6 cm 06/30/23 1500   Wound Depth (cm) 0.1 cm 06/30/23 1500   Wound Surface Area (cm^2) 0.12 cm^2 06/30/23 1500   Wound Volume (cm^3) 0.012 cm^3 06/30/23 1500   Post-Procedure Length (cm) 0.3 cm 06/23/23 1400   Post-Procedure Width (cm) 0.4 cm 06/23/23 1400   Post-Procedure Depth (cm) 0.2 cm 06/23/23 1400   Post-Procedure Surface Area (cm^2) 0.12 cm^2 06/23/23 1400   Post-Procedure Volume (cm^3) 0.024 cm^3 06/23/23 1400   Wound Healing % 78 06/30/23 1500   Tunneling (cm) 0 cm 06/30/23 1500   Undermining (cm) 0 cm 06/30/23 1500   Wound Odor None 07/14/23 1500   Exposed Structures KEN 07/14/23 1500   Number of days: 26       Wound 06/18/23 Full Thickness Wound Leg Posterior Left (Active)   Wound Image    07/14/23 1500   Site Assessment Red;Boggy;Granulation tissue 07/14/23 1500   Periwound Assessment Fragile 07/14/23 1500   Margins Attached edges 07/14/23 1500   Drainage Amount Small 07/14/23 1500   Drainage Description Serosanguineous 07/14/23 1500   Treatments Cleansed;Provider debridement 07/14/23 1500   Wound Cleansing Foam Cleanser/Washcloth 07/14/23 1500   Periwound Protectant Barrier Paste 07/14/23 1500   Dressing Changed  Changed 07/14/23 1500   Dressing Cleansing/Solutions Not Applicable 07/14/23 1500   Dressing Options Hydrofiber Silver;Mepilex;Tubigrip 07/14/23 1500   Dressing Change/Treatment Frequency Every 72 hrs, and As Needed 06/23/23 1400   Non-staged Wound Description Full thickness 07/14/23 1500   Wound Length (cm) 1.3 cm 07/14/23 1500   Wound Width (cm) 1 cm 07/14/23 1500   Wound Depth (cm) 0.1 cm 07/14/23 1500   Wound Surface Area (cm^2) 1.3 cm^2 07/14/23 1500   Wound Volume (cm^3) 0.13 cm^3 07/14/23 1500   Post-Procedure Length (cm) 1.2 cm 07/14/23 1500   Post-Procedure Width (cm) 0.8 cm 07/14/23 1500   Post-Procedure Depth (cm) 0.1 cm 07/14/23 1500   Post-Procedure Surface Area (cm^2) 0.96 cm^2 07/14/23 1500   Post-Procedure Volume (cm^3) 0.096 cm^3 07/14/23 1500   Wound Healing % 87 07/14/23 1500   Tunneling (cm) 0 cm 07/14/23 1500   Undermining (cm) 0 cm 07/14/23 1500   Wound Odor None 07/14/23 1500   Exposed Structures None 07/14/23 1500   Number of days: 26       PROCEDURE: Excisional debridement of sacral wound   -2% viscous lidocaine applied topically to wound bed for approximately 5 minutes prior to debridement  -Curette used to debride wound bed.  Excisional debridement was performed to remove devitalized tissue until healthy, bleeding tissue was visualized.  Total area debrided approximately 10 cm² .  Tissue debrided into the muscle / fascia layer.    -Bleeding controlled with manual pressure.    -Wound care completed by wound RN, refer to flowsheet  -Patient tolerated the procedure well, without c/o pain or discomfort.      PROCEDURE: Excisional debridement of left medial lower leg wound and left posterior leg wound  -2% viscous lidocaine applied topically to wound bed for approximately 5 minutes prior to debridement  -Curette used to debride wound bed.  Excisional debridement was performed to remove devitalized tissue until healthy, bleeding tissue was visualized.   Entire surface of wound, 12.36 cm²  debrided.  Tissue debrided into the subcutaneous layer.    -Bleeding controlled with manual pressure.    -Wound care completed by wound RN, refer to flowsheet  -Patient tolerated the procedure well, without c/o pain or discomfort.        BIOLOGIC LOG  5/20/2022-first application.  PRODUCT: EpiCord 2 x 3 cm . PRODUCT #[id number]; EXPIRES: 2026-12-01 5/27/2022- second application.  PRODUCT: EpiCordEX 2 x 3 cm . PRODUCT #[id number]; EXPIRES: 2026-09-01    6/3/2022- third application.  PRODUCT: EpiCordEX 2 x 3 cm . PRODUCT #EX [id number]; EXPIRES: 2026-10-01    6/10/2022- fourth application.  PRODUCT: EpiCordEX 2 x 3 cm . PRODUCT #EX [id number]; EXPIRES: 2026-09-01 6/17/2022- fifth application.  PRODUCT: EpiCordEX 2 x 3 cm . PRODUCT #EX [id number]; EXPIRES: 2027-02-01 6/24/2022- sixth application.  PRODUCT: EpiCordEX 2 x 3 cm . PRODUCT #EX [id number]; EXPIRES: 2027-02-01 07/01/22: Seventh application.  Product: Epicort Dx 2.0 x 3.0 cm reorder number [id number]; product number [id number]; expires 2027-02-01 7/22/2022- eighth application.  PRODUCT: EpiCord 2 x 3 cm, reorder #[id number]. PRODUCT #[id number]; EXPIRES: 2027-02-01      Pertinent Labs and Diagnostics:    Labs:     A1c: No results found for: HBA1C     [facility] results, 7/1/2022 (under media tab)    CRP 13    ESR 31      IMAGING:     10/3/2022-CT of pelvis with contrast  IMPRESSION:     1.  Posterior soft tissue sacral wound and 1.5 x 3.7 cm abscess.   2.  Progressive destruction of the distal sacrum and proximal coccyx. Sclerosis and irregularity of the distal sacrum consistent with osteomyelitis.   3.  Old wound within the soft tissues posterior to the distal left SI joint with possible fistulous communication.    10/3/2022-CT of tib-fib with and without contrast, with reconstruction  IMPRESSION:     1.  Old fractures of the left tibia and fibula with extensive callus formation and bony bridging as well  as extensive dystrophic calcifications. Similar to previous.   2.  Distal medial soft tissue wounds with ill-defined superficial in the soft tissue thickening and fluid collections suggestive of phlegmon. No organized abscess identified.   3.  No definite osteomyelitis.   4.  Arthritic changes.        VASCULAR STUDIES: No results found.    LAST  WOUND CULTURE:   Lab Results   Component Value Date/Time    CULTRSULT  06/17/2023 09:43 PM     No growth after 5 days of incubation.  Blood culture testing and Gram stain, if indicated, are  performed at [facility], [address] [place] [address].  Positive blood cultures are  sent to [facility], [address] [place] [address], for organism identification and  susceptibility testing.        PATHOLOGY  2/17/2023-bone fragment extracted from left lower extremity wound\\  FINAL DIAGNOSIS:     A. Left leg bone fragment at base of chronic wound:          Extensively degenerated fibrocartilaginous tissue with a rim of           fibrinopurulent debris          Correlate with culture findings            Comment: While no residual intact bone is identified, these           findings are suggestive of adjacent osteomyelitis.      ASSESSMENT AND PLAN:     1. Sacral decubitus ulcer, stage IV ([facility])  Comments: Ulcer first noted in early April 2022 as small open area which quickly enlarged.  This ulcer is present distal from previous sacral ulcer which healed after surgery in January.  Patient has history of flap reconstruction x2 to this area.  CT shows progressive destruction of distal sacrum and proximal coccyx.    7/14/2023: Wound area measures larger today, though I suspect this is largely positional.  Undermining persists  -Excisional debridement of wound in clinic today, medically necessary to promote wound healing.  -Home health to change dressing 1-2 times per week in between clinic visits  -Patient does spend a lot of time up in  his wheelchair, and feels this is necessary for his quality of life.  He does have an appropriate pressure-relief cushion.  He is very well versed in pressure relieving techniques  - Patient does not currently have follow up with Dr. Saini. If wound deteriorates or fails to improve can consider re-establishing with Dr. Saini for evaluation for coverage options. Patient does not want to pursue at this time and is happy with current conservative approach even with wound worsening.  - Known OM that has already been treated. No utility of Abx unless gross soft tissue infection which does not appear to be present today.      Wound care: Collagen, Hydrofiber silver strip, hydrofiber silver, Mepilex    2. Postoperative wound dehiscence, subsequent encounter  3. Osteomyelitis of left lower extremity  Comments: On 4/26/2022 patient underwent irrigation and debridement of multiple compartments of the left lower extremity states for pressure injury, with bone excision, and complex closure of chronic wound using biologic skin substitute.  During postop visit in surgeons office on 5/11, surgical site was noted to be dehisced.  Patient was referred to wound clinic for management.    7/14/2023: Wound has deteriorated significantly since last assessment, area is increased, tissue quality is poor.  -Excisional debridement of wound in clinic today, medically necessary to promote wound healing.  -Patient to return to clinic weekly for assessment and debridement  -Home health to change dressing 1-2 times per week in between clinic visits OM.  -Suspect underlying OM, however patient does not wish to consider surgical options at this time nor does his surgeon wish to do any further surgery to this leg. 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 There is no gross soft tissue infection and repeated Abx treatment without source control is not indicated.    -This wound has waxed and waned throughout treatment.   Continue to monitor  -Patient to be mindful of offloading when supine, should assure that his leg is not rotating medially  Wound care: Hydrofiber silver, silicone adhesive foam, tubigrip    3. Deep tissue injury  4. Pressure injury of left foot, stage IV  Comments: DTI to posterior left lower leg first observed in clinic 7/29/2022.  Patient does not know how it started, had been wearing heel float boot consistently.  Evolved into stage IV pressure injury    7/14/2023: Wound resolved.  High risk for recurrence due to presence of scar tissue, limited mobility, lack of sensation.  -Assess each clinic visit    Wound care: Silicone foam dressing to protect newly epithelialized tissue, Tubigrip D to manage edema    5. Pressure injury of left heel, stage 3 ([facility])  Comments: First noted in clinic on 10/21/2022, originally noted to be stage II    7/14/2023: Wound remains resolved, though covered with fragile epithelium.  High risk for recurrence due to presence of scar tissue, limited mobility and lack of sensation.  -Monitor each clinic visit, observe for deterioration  - Patient continue wearing heel float boots at all times  - Heel does not appear to contact any solid surfaces while in wheelchair   Wound Care: Heel Mepilex to reduce pressure and friction    6. Quadriplegia, C5-C7, incomplete ([facility])  Comments: Complicating factor.  Impaired mobility and sensation  -Patient is still spending 7-8 hours/day up in his wheelchair, though he does have appropriate offloading cushion, and knows to reposition frequently.  Wears heel float boots bilaterally at all times          Please note that this note may have been created using voice recognition software. I have worked with technical experts from [employer] to optimize the interface.  I have made every reasonable attempt to correct obvious errors, but there may be errors of grammar and possibly content that I did not discover before finalizing the note.

## 2023-07-19 ENCOUNTER — NON-PROVIDER VISIT (OUTPATIENT)
Dept: CARDIOLOGY | Facility: MEDICAL CENTER | Age: 73
End: 2023-07-19
Payer: MEDICARE

## 2023-07-19 PROCEDURE — 93298 REM INTERROG DEV EVAL SCRMS: CPT | Performed by: INTERNAL MEDICINE

## 2023-07-19 NOTE — CARDIAC REMOTE MONITOR - SCAN
Device transmission reviewed. Device demonstrated appropriate function.       Electronically Signed by: Briana Bell M.D.    7/28/2023  4:25 PM

## 2023-07-21 ENCOUNTER — OFFICE VISIT (OUTPATIENT)
Dept: WOUND CARE | Facility: MEDICAL CENTER | Age: 73
End: 2023-07-21
Attending: INTERNAL MEDICINE
Payer: MEDICARE

## 2023-07-21 VITALS
TEMPERATURE: 98.3 F | DIASTOLIC BLOOD PRESSURE: 58 MMHG | RESPIRATION RATE: 17 BRPM | OXYGEN SATURATION: 96 % | HEART RATE: 58 BPM | SYSTOLIC BLOOD PRESSURE: 91 MMHG

## 2023-07-21 DIAGNOSIS — M86.9 OSTEOMYELITIS OF LEFT LOWER EXTREMITY (HCC): ICD-10-CM

## 2023-07-21 DIAGNOSIS — T14.8XXA DEEP TISSUE INJURY: ICD-10-CM

## 2023-07-21 DIAGNOSIS — T81.31XD POSTOPERATIVE WOUND DEHISCENCE, SUBSEQUENT ENCOUNTER: ICD-10-CM

## 2023-07-21 DIAGNOSIS — L89.894 PRESSURE INJURY OF LEFT FOOT, STAGE 4 (HCC): ICD-10-CM

## 2023-07-21 DIAGNOSIS — L89.623 PRESSURE INJURY OF LEFT HEEL, STAGE 3 (HCC): ICD-10-CM

## 2023-07-21 DIAGNOSIS — G82.54 QUADRIPLEGIA, C5-C7 INCOMPLETE (HCC): ICD-10-CM

## 2023-07-21 DIAGNOSIS — L89.154 SACRAL DECUBITUS ULCER, STAGE IV (HCC): ICD-10-CM

## 2023-07-21 PROCEDURE — 3074F SYST BP LT 130 MM HG: CPT | Performed by: STUDENT IN AN ORGANIZED HEALTH CARE EDUCATION/TRAINING PROGRAM

## 2023-07-21 PROCEDURE — 3078F DIAST BP <80 MM HG: CPT | Performed by: STUDENT IN AN ORGANIZED HEALTH CARE EDUCATION/TRAINING PROGRAM

## 2023-07-21 PROCEDURE — 11042 DBRDMT SUBQ TIS 1ST 20SQCM/<: CPT

## 2023-07-21 PROCEDURE — 11042 DBRDMT SUBQ TIS 1ST 20SQCM/<: CPT | Performed by: STUDENT IN AN ORGANIZED HEALTH CARE EDUCATION/TRAINING PROGRAM

## 2023-07-21 NOTE — PROGRESS NOTES
Provider Encounter- Pressure Injury        HISTORY OF PRESENT ILLNESS  Wound History:    START OF CARE IN CLINIC: 6/23/2023 (return to clinic after hospitalization)    REFERRING PROVIDER: Sahara Mendez      WOUNDS- Pressure Injury, Sacrococcygeal, stage IV                                                             Surgical wound dehiscence, left medial lower leg-status post surgical I&D of stage IV pressure injury and           biologic application                    Pressure injury, DTI, left posterior lower leg-first observed in clinic 7/29/2022       Pressure injury stage III L heel - first noted 10/21     Right 2nd toe avulsion - first noted 10/21     Skin tag left posterior ear - first noted 10/21     HISTORY: Patient with history of incomplete quadriplegia referred to [facility] for treatment of a stage IV pressure injury.  He has a history of previous pressure injuries to this area, and underwent muscle flaps in 2019, and then again in 2020.  He was seen in the wound clinic in November 2021 for an ulcer proximal from his current ulcer, and pressure injuries to his left posterior lower leg and left heel.  At that time, it was discovered that the patient had retained VAC foam embedded in the wound bed of the sacral wound.  Attempts were made to get him back to his plastic surgeon, though unsuccessful.  In January he underwent surgical removal of VAC sponge along with excisional debridement of his sacral wound by Dr. Chaves.  After the surgery, his wound went on to heal without incident.   In early April 2022, his home health nurse noted a new sacral ulcer, below the previous ulcer which quickly tripled in size over the following weeks.  The ulcer to his left medial lower leg had also deteriorated, with bone visible at the base..  He was hospitalized from 4/22 until 4/27/2022 and underwent surgery with Dr. Raman on 4/26 for irrigation and debridement of multiple compartments of the left lower extremity, bone  excision, and complex closure of chronic wound using biologic skin substitute.   His sacrococcygeal wound was not surgically addressed during this admission.  He was discharged back to his group home, with home health, and referral to outpatient wound clinic for his sacral wound.  He was instructed to follow-up with his surgeon for his lower leg wound.       Postoperatively, the left medial lower leg incision dehisced.  He was seen by his surgeon at [facility] on 5/11.  The surgeon opted to leave remaining sutures in place, and refer him to the wound clinic for treatment of this wound.   Treatment of this wound was initiated in clinic on 5/12.  During this visit was also noted that his heel DTI had resolved, but that he had a new pressure injury to his left posterior lower extremity.     A new pressure injury was noted to patient's right upper buttock/lower back on 5/20/2022.  Wound was linear in shape, skin discolored but intact.     Abrasion noted to left anterior lower leg.  First observed in clinic on 7/22/2022.  Patient states he bumped his leg into his food tray.     Small DTI noted to patient's left lateral lower leg on 7/29/2022.  Skin intact but discolored.     Large area of deep tissue injury noted to patient's left exterior lower leg.  Patient denied any trauma to this area.  Skin intact.  Wound documented.    1/27/2023: Patient was admitted to [facility] from 1/23-1/25/2023 with gross hematuria. He underwent RICHARD which showed watchman device was in place and he was taken off of Xarelto. While hospitalized wound team was consulted. He was referred back to [facility] and home health upon discharge.    Patient was hospitalized at [facility] for pyelonephritis from 2/26 until 3/2/2023, admitted for fever and general malaise.  He was admitted and initially started on linezolid and meropenem for suspected UTI and history of multidrug-resistant organisms.  Urine cultures were negative. ID was consulted, recommended CT of chest and  "abdomen,which were negative for acute findings. However, he was treated with 5 days total course of antibiotics for suspected UTI, and symptoms completely resolved.  During this admission, the inpatient wound team was consulted for treatment of his sacral and lower leg wounds.  A wound culture was taken from his left heel pressure ulcer, negative.  Once stabilized, he was discharged home and referred back to [facility] to resume treatment of his wounds.    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  Never used smokeless tobacco    Patient's problem list, allergies, and current medications reviewed and updated in Epic    Interval History:  Interval History thinned 7/29/2022.  Please see previous notes for complete interval history.     Interval History thinned 1/27/2023. Please see previous note for complete interval history.    Interval History thinned 3/3/2023.  Please see previous notes for complete interval history.      3/3/2023 : Clinic visit with ADILSON Arthur, FNPEGGY-BC, CWDINESHN, CFTRACI.   Patient returns to clinic after hospitalization for UTI.  He states that overall he is feeling well, denies fevers, chills, nausea, vomiting, cough or shortness of breath.    Per last wound clinic note, a loose bone fragment was extracted from the wound bed of his left medial leg wound and sent for pathology.  Histology report described, \"extensively degenerated fibrocartilaginous tissue\", suggestive of adjacent osteomyelitis.  X-ray of tib-fib ordered to assess for OM.    3/10/2023: Clinic visit with Steve Hodges MD. Patient reports feeling in normal state of health. He obtained Xray left tib-fib, " we do not have images, but report states no evidence of acute pathology such as OM. We discussed options for more aggressive treatment, patient would prefer to watch and wait. Left heel remains open since hospitalization despite using Prevalon boots, will prescribe Prafo boot for offloading. Sacrum measures slightly smaller, does not appear significantly changed.    3/24/2023: Clinic visit with Steve Hodges MD. Patient reports feeling well. He obtained PRAFO boot and left heel wound smaller. Rest of wounds are not significantly changed. New linear friction wound buttocks. Patient reports occurred when transitioning from laying to sitting with his low airloss mattress.    3/31/2023 : Clinic visit with Kelly Sandy, ADILSON, FNP-BC, CWDINESHN, CFCN.   Anjum presents today complaining of cold symptoms.  States he has had an upper respiratory illness for a couple of weeks now but feels he is getting better.  He does have a new tender area to his plantar foot with mild discoloration, possibly caused by seam on insole to PRAFO boot.  He has stopped wearing the PRAFO and is now wearing Prevalon boot, and feels this is improving.  The left medial lower leg wound does show some improvement, with decrease in area.  Sacral wound is about the same.  The left heel ulcer is again almost healed, with thin layer of epithelium noted to wound bed, scant drainage.    4/7/2023: Clinic visit with Steve Hodges MD. Patient reports doing well. Excited for seeing family for [place] and SO is coming to visit. Patient denies any symptoms of infection. His left medial leg wound is covered with thin, fragile epithelium and boggy due to poor quality of underlying tissue rather than abscess or fluid collection. Left plantar foot has opened slightly but appears improving, left heel is smaller. He continues to use Prevalon boots. Patient reports new abrasion to lateral right knee from rubbing on the dashboard, does not appear open. Home health has  been applying foam dressing for padding.    4/21/2023: Clinic visit with Steve Hodges MD. Patient reports feeling well, denies any symptoms of infection. He has been using Prevalon boots 24 hours a day, despite this, he has worsening left heel pressure injury. We discussed other methods and he will try to use pillow under leg to offload heel. We also applied an allyven dressing to plantar foot to reduce friction and pressure and he can try to use PRAFO boot. His heel does not appear to be in contact with any solid surface on his wheelchair. Other wounds are unchanged. Due to worsening left heel wound, will bring back to clinic next week for evaluation.    4/28/2023: Clinic visit with Steve Hodges MD. Patient reports feeling well, denies any fevers or chills. He has been using pillow under leg to completely offload heel at night and both heel / plantar foot wound improved. He reports with the nice weather he has been spending majority of his day up in chair outside. Unfortunately his sacral wound larger and bone palpable. He is unsure if he would want to be evaluated again by surgery for possible flap.    5/5/2023: Clinic visit with Steve Hodges MD. Patient denies any complaints today. He reports that he has spent less time in wheelchair in effort to improve offloading. Patients lower extremity wounds are fairly stable. Sacral wound appears stable with small partial thickness linear wound superior. He does not recall any specific injury.    5/12/2023 : Clinic visit with ADILSON Arthur, HOLDEN, IMAN, LILIYA.   Anjum continues to feel well overall.  His wounds continue to wax and wane.  He states that his low-air-loss bed was malfunctioning, was bottoming out, has since been repaired.  He continues to spend much of his day up in his wheelchair.    5/19/2023 : Clinic visit with ADILSON Arthur, HOLDEN, IMAN, LILIYA.   Anjum states he is feeling very well today.  Sacral wound about the same, lateral leg wound  continues to wax and wane.  Heel wound is improving steadily.  Plantar foot wound slowly progressing.    5/26/2023 : Clinic visit with ADILSON Arthur, HOLDEN, LILIYA VALERIO.   Continues to feel well.  His heel ulcer is healed, though covered with fragile epithelium.  Medial leg wound has deteriorated slightly.  Sacral and plantar foot wounds are both improved.    6/2/2023 : Clinic visit with ADILSON Arthur, HOLDEN, GEETHA VALERIO   Anjum informed us prior to today's visit that he tested positive for COVID earlier this week.  His symptoms have resolved, and he is wearing an N95 mask today, as am I and the wound nurse.  He states that today he is feeling well, denies fevers, chills, nausea, vomiting, cough or shortness of breath.    Unfortunately, his left medial lower leg wound has deteriorated significantly.  This has happened before, underlying osteomyelitis is suspected, however he does not wish to undergo amputation, nor does his surgeon wish to do so.  Tissue quality of this wound has been poor for some time.   His sacral wound is slowly progressing.  Plantar foot wound is also slowly getting better.   His left heel ulcer, previously resolved, presents today with discoloration.  Suspect DTI.  Will need to be monitored    6/9/2023: Clinic visit with Steve Hodges MD. Anjum reports feeling better, has completely recovered from COVID and denies any complaints. His left leg wounds worsening with new left posterior leg wound, possibly related to the suspected OM. His plantar foot wound with fascia or tendon exposed in base. He does not want any surgical intervention such as amputation. Sacral wound is stable.    6/16/2023 : Clinic visit with ADILSON Arthur, HOLDEN, GEETHA VALERIO   Anjum states he is feeling very well.  His posterior leg wounds have again healed, though covered with thin epithelium.  Medial leg wound and plantar foot wound have both improved.  Sacral wound about the same.    6/23/2023: Clinic  visit with Steve Hodges MD. Patient returns to clinic following recent hospitalization for fever. He was treated with Abx and suprapubic catheter was exchanged. His left leg wounds are improved. Sacrum measuring larger.    6/30/2023 : Clinic visit with ADILSON Arthur, HOLDEN, IMAN, LILIYA.   Anjum continues to feel well.  He is heel ulcer is healed, and plantar foot wound is nearly resolved.  Unfortunately, he has a reopening of the posterior leg wound.  Sacral wound measures about the same.  Medial leg wound continues to wax and wane with poor tissue quality.    7/14/2023 : Clinic visit with ADILSON Arthur, HOLDEN, IMAN, LILIYA.   Anjum states he is feeling well today.  He is left heel ulcer remains resolved, though covered with friable epithelium.  Plantar foot ulcer is now healed.  Unfortunately, medial left lower leg ulcers continue to deteriorate, with poor tissue quality.  Posterior wound is also deteriorated.  Sacral ulcer is about the same.  He reiterated that he does not want to consider surgery of any sort at this time.    7/21/2023: Clinic visit with Steve Hodges MD. Patient reports feeling well, denies any complaints. He denies any fevers or chills. Heel ulcer remains resolved. Foot wound and sacrum is stable. Lower leg ulcers appear about the same.    REVIEW OF SYSTEMS:   Unchanged from previous wound clinic assessment on 7/14/2023, except as noted in interval history above     PHYSICAL EXAMINATION:   BP 91/58   Pulse (!) 58   Temp 36.8 °C (98.3 °F) (Temporal)   Resp 17   SpO2 96%   Physical Exam  Constitutional:       Appearance: He is obese.   Cardiovascular:      Rate and Rhythm: Normal rate.   Pulmonary:      Effort: Pulmonary effort is normal.   Abdominal:      Comments: Colostomy left lower quadrant   Genitourinary:     Comments: Suprapubic catheter to down drain   Skin:     Comments: Stage IV coccygeal pressure injury -Stable, undermining persists, thin layer of tissue over bone,  thin layer of slough to wound bed, moderate serosanguineous drainage, periwound is without erythema or induration, there is no wound odor    Full-thickness wound to left medial lower leg - Stable, poor tissue quality, minimal to moderate serosanguineous drainage, no evidence of infection    Stage III pressure injury left posterior heel -remains resolved.  Epithelium intact though friable    Stage IV pressure injury plantar foot - Wound reopened. Small with slough.    Stage III pressure injury to posterior left lower leg, recurring-shallow stage III,  wound area has decreased since last assessment, poor tissue quality, minimal to moderate serosanguineous drainage, no evidence of infection      Refer to wound flowsheet and photos   Neurological:      Mental Status: He is alert and oriented to person, place, and time.   Psychiatric:         Mood and Affect: Mood normal.         WOUND ASSESSMENT  Wound 06/18/23 Pressure Injury Coccyx Stage IV (Active)   Wound Image   07/21/23 1600   Site Assessment Red;[place] 07/21/23 1600   Periwound Assessment Blanchable erythema;Fragile 07/21/23 1600   Margins Unattached edges 07/21/23 1600   Drainage Amount Large 07/21/23 1600   Drainage Description Yellow;Cloudy/turbid;Thick 07/21/23 1600   Treatments Cleansed 07/21/23 1600   Wound Cleansing Foam Cleanser/Washcloth 07/21/23 1600   Periwound Protectant Skin Protectant Wipes to Periwound;Barrier Paste 07/21/23 1600   Dressing Changed Changed 07/21/23 1600   Dressing Cleansing/Solutions Not Applicable 07/21/23 1600   Dressing Options Hydrofiber Silver;Other (Comments) 07/21/23 1600   Dressing Change/Treatment Frequency Every 72 hrs, and As Needed 07/21/23 1600   WOUND NURSE ONLY - Pressure Injury Stage 4 07/14/23 1500   Wound Length (cm) 4 cm 07/21/23 1600   Wound Width (cm) 2 cm 07/21/23 1600   Wound Depth (cm) 0.9 cm 07/21/23 1600   Wound Surface Area (cm^2) 8 cm^2 07/21/23 1600   Wound Volume (cm^3) 7.2 cm^3 07/21/23 1600    Post-Procedure Length (cm) 4.1 cm 07/14/23 1500   Post-Procedure Width (cm) 2 cm 07/14/23 1500   Post-Procedure Depth (cm) 1.3 cm 07/14/23 1500   Post-Procedure Surface Area (cm^2) 8.2 cm^2 07/14/23 1500   Post-Procedure Volume (cm^3) 10.66 cm^3 07/14/23 1500   Wound Healing % -98 07/21/23 1600   Tunneling (cm) 0 cm 07/21/23 1600   Tunneling Clock Position of Wound 12 05/19/23 1400   Undermining (cm) 1.2 cm 07/21/23 1600   Undermining of Wound, 1st Location From 7 o'clock;To 9 o'clock 07/21/23 1600   Undermining (cm) - 2nd location 3.5 cm 07/21/23 1600   Undermining of Wound, 2nd Location From 12 o'clock;To 2 o'clock 07/21/23 1600   Wound Odor None 07/21/23 1600   Exposed Structures Other (Comments) 07/21/23 1600   Number of days: 34       Wound 06/18/23 Full Thickness Wound Leg Medial Left (Active)   Wound Image   07/21/23 1600   Site Assessment Red;Purple;Boggy;Bleeding;Fragile 07/21/23 1600   Periwound Assessment Intact;Dry 07/21/23 1600   Margins Unattached edges 07/21/23 1600   Drainage Amount Moderate 07/21/23 1600   Drainage Description Sanguineous;Serosanguineous 07/21/23 1600   Treatments Cleansed 07/21/23 1600   Wound Cleansing Foam Cleanser/Washcloth 07/21/23 1600   Periwound Protectant Skin Protectant Wipes to Periwound;Barrier Paste 07/21/23 1600   Dressing Changed Changed 07/21/23 1600   Dressing Cleansing/Solutions Not Applicable 07/21/23 1600   Dressing Options Hydrofiber Silver;Mepilex Heel;Tubigrip;Other (Comments) 07/21/23 1600   Dressing Change/Treatment Frequency Every 72 hrs, and As Needed 07/21/23 1600   Non-staged Wound Description Full thickness 07/21/23 1600   Wound Length (cm) 1.5 cm 07/21/23 1600   Wound Width (cm) 6 cm 07/21/23 1600   Wound Depth (cm) 0.6 cm 07/21/23 1600   Wound Surface Area (cm^2) 9 cm^2 07/21/23 1600   Wound Volume (cm^3) 5.4 cm^3 07/21/23 1600   Post-Procedure Length (cm) 2 cm 07/14/23 1500   Post-Procedure Width (cm) 5.7 cm 07/14/23 1500   Post-Procedure Depth  (cm) 0.7 cm 07/14/23 1500   Post-Procedure Surface Area (cm^2) 11.4 cm^2 07/14/23 1500   Post-Procedure Volume (cm^3) 7.98 cm^3 07/14/23 1500   Wound Healing % -1700 07/21/23 1600   Tunneling (cm) 0 cm 07/21/23 1600   Undermining (cm) 0 cm 07/21/23 1600   Undermining of Wound, 1st Location From 1 o'clock;To 2 o'clock 06/02/23 1600   Undermining (cm) - 2nd location 1.3 cm 06/02/23 1600   Undermining of Wound, 2nd Location From 3 o'clock;To 4 o'clock 06/02/23 1600   Wound Odor None 07/21/23 1600   Exposed Structures None 07/21/23 1600   Number of days: 34       Wound 06/18/23 Pressure Injury Foot Lateral Left POA resolving stage IV (Active)   Wound Image    07/21/23 1600   Site Assessment Red;Yellow 07/21/23 1600   Periwound Assessment Intact;Flaky;Edema 07/21/23 1600   Margins Attached edges 07/21/23 1600   Closure Secondary intention 07/21/23 1600   Drainage Amount Moderate 07/21/23 1600   Drainage Description Serosanguineous 07/21/23 1600   Treatments Cleansed;Provider debridement 07/21/23 1600   Wound Cleansing Foam Cleanser/Washcloth 07/21/23 1600   Periwound Protectant Skin Protectant Wipes to Periwound;Barrier Paste 07/21/23 1600   Dressing Changed New 07/21/23 1600   Dressing Cleansing/Solutions Not Applicable 07/21/23 1600   Dressing Options Hydrofiber Silver;Silicone Adhesive Foam;Tubigrip;Other (Comments) 07/21/23 1600   Dressing Change/Treatment Frequency Every 72 hrs, and As Needed 06/23/23 1400   WOUND NURSE ONLY - Pressure Injury Stage 4 06/30/23 1500   Wound Length (cm) 0.2 cm 07/21/23 1600   Wound Width (cm) 0.3 cm 07/21/23 1600   Wound Depth (cm) 0.1 cm 07/21/23 1600   Wound Surface Area (cm^2) 0.06 cm^2 07/21/23 1600   Wound Volume (cm^3) 0.006 cm^3 07/21/23 1600   Post-Procedure Length (cm) 0.3 cm 07/21/23 1600   Post-Procedure Width (cm) 0.3 cm 07/21/23 1600   Post-Procedure Depth (cm) 0.2 cm 07/21/23 1600   Post-Procedure Surface Area (cm^2) 0.09 cm^2 07/21/23 1600   Post-Procedure Volume (cm^3)  0.018 cm^3 07/21/23 1600   Wound Healing % 89 07/21/23 1600   Tunneling (cm) 0 cm 07/21/23 1600   Undermining (cm) 0 cm 07/21/23 1600   Wound Odor None 07/21/23 1600   Exposed Structures None 07/21/23 1600   Number of days: 34       Wound 06/18/23 Full Thickness Wound Leg Posterior Left (Active)   Wound Image   07/21/23 1600   Site Assessment Pink;Red 07/21/23 1600   Periwound Assessment Blanchable erythema;Flaky;Fragile 07/21/23 1600   Margins Attached edges 07/21/23 1600   Closure Secondary intention 07/21/23 1600   Drainage Amount Moderate 07/21/23 1600   Drainage Description Serosanguineous 07/21/23 1600   Treatments Cleansed 07/21/23 1600   Wound Cleansing Foam Cleanser/Washcloth 07/21/23 1600   Periwound Protectant Skin Protectant Wipes to Periwound;Barrier Paste 07/21/23 1600   Dressing Changed Changed 07/21/23 1600   Dressing Cleansing/Solutions Not Applicable 07/21/23 1600   Dressing Options Hydrofiber Silver;Tubigrip;Other (Comments) 07/21/23 1600   Dressing Change/Treatment Frequency Every 72 hrs, and As Needed 07/21/23 1600   Non-staged Wound Description Full thickness 07/21/23 1600   Wound Length (cm) 0.7 cm 07/21/23 1600   Wound Width (cm) 0.3 cm 07/21/23 1600   Wound Depth (cm) 0.1 cm 07/21/23 1600   Wound Surface Area (cm^2) 0.21 cm^2 07/21/23 1600   Wound Volume (cm^3) 0.021 cm^3 07/21/23 1600   Post-Procedure Length (cm) 1.2 cm 07/14/23 1500   Post-Procedure Width (cm) 0.8 cm 07/14/23 1500   Post-Procedure Depth (cm) 0.1 cm 07/14/23 1500   Post-Procedure Surface Area (cm^2) 0.96 cm^2 07/14/23 1500   Post-Procedure Volume (cm^3) 0.096 cm^3 07/14/23 1500   Wound Healing % 98 07/21/23 1600   Tunneling (cm) 0 cm 07/21/23 1600   Undermining (cm) 0 cm 07/21/23 1600   Wound Odor None 07/21/23 1600   Exposed Structures None 07/21/23 1600   Number of days: 34       PROCEDURE: Left plantar lateral foot wound  -2% viscous lidocaine applied topically to wound bed for approximately 5 minutes prior to  debridement  -Curette used to debride wound bed.  Excisional debridement was performed to remove devitalized tissue until healthy, bleeding tissue was visualized.   Entire surface of wound, 0.09 cm² debrided.  Tissue debrided into the subcutaneous layer.    -Bleeding controlled with manual pressure.    -Wound care completed by wound RN, refer to flowsheet  -Patient tolerated the procedure well, without c/o pain or discomfort.        BIOLOGIC LOG  5/20/2022-first application.  PRODUCT: EpiCord 2 x 3 cm . PRODUCT #[id number]; EXPIRES: 2026-12-01 5/27/2022- second application.  PRODUCT: EpiCordEX 2 x 3 cm . PRODUCT #[id number]; EXPIRES: 2026-09-01    6/3/2022- third application.  PRODUCT: EpiCordEX 2 x 3 cm . PRODUCT #EX [id number]; EXPIRES: 2026-10-01    6/10/2022- fourth application.  PRODUCT: EpiCordEX 2 x 3 cm . PRODUCT #[id number]; EXPIRES: 2026-09-01 6/17/2022- fifth application.  PRODUCT: EpiCordEX 2 x 3 cm . PRODUCT #EX [id number]; EXPIRES: 2027-02-01 6/24/2022- sixth application.  PRODUCT: EpiCordEX 2 x 3 cm . PRODUCT #[id number]; EXPIRES: 2027-02-01 07/01/22: Seventh application.  Product: Epicort Dx 2.0 x 3.0 cm reorder number [id number]; product number [id number]; expires 2027-02-01 7/22/2022- eighth application.  PRODUCT: EpiCord 2 x 3 cm, reorder #[id number]. PRODUCT #[id number]; EXPIRES: 2027-02-01      Pertinent Labs and Diagnostics:    Labs:     A1c: No results found for: HBA1C     [facility] results, 7/1/2022 (under media tab)    CRP 13    ESR 31      IMAGING:     10/3/2022-CT of pelvis with contrast  IMPRESSION:     1.  Posterior soft tissue sacral wound and 1.5 x 3.7 cm abscess.   2.  Progressive destruction of the distal sacrum and proximal coccyx. Sclerosis and irregularity of the distal sacrum consistent with osteomyelitis.   3.  Old wound within the soft tissues posterior to the distal left SI joint with possible fistulous  communication.    10/3/2022-CT of tib-fib with and without contrast, with reconstruction  IMPRESSION:     1.  Old fractures of the left tibia and fibula with extensive callus formation and bony bridging as well as extensive dystrophic calcifications. Similar to previous.   2.  Distal medial soft tissue wounds with ill-defined superficial in the soft tissue thickening and fluid collections suggestive of phlegmon. No organized abscess identified.   3.  No definite osteomyelitis.   4.  Arthritic changes.        VASCULAR STUDIES: No results found.    LAST  WOUND CULTURE:   Lab Results   Component Value Date/Time    CULTRSULT  06/17/2023 09:43 PM     No growth after 5 days of incubation.  Blood culture testing and Gram stain, if indicated, are  performed at [facility], [address] [place] [address].  Positive blood cultures are  sent to [facility], [address] [place] [address], for organism identification and  susceptibility testing.        PATHOLOGY  2/17/2023-bone fragment extracted from left lower extremity wound\\  FINAL DIAGNOSIS:     A. Left leg bone fragment at base of chronic wound:          Extensively degenerated fibrocartilaginous tissue with a rim of           fibrinopurulent debris          Correlate with culture findings            Comment: While no residual intact bone is identified, these           findings are suggestive of adjacent osteomyelitis.      ASSESSMENT AND PLAN:     1. Sacral decubitus ulcer, stage IV ([facility])  Comments: Ulcer first noted in early April 2022 as small open area which quickly enlarged.  This ulcer is present distal from previous sacral ulcer which healed after surgery in January.  Patient has history of flap reconstruction x2 to this area.  CT shows progressive destruction of distal sacrum and proximal coccyx.    7/21/2023: Wound appears stable, free of slough. Undermining persists  -No debridement required today  -Home health  to change dressing 1-2 times per week in between clinic visits  -Patient does spend a lot of time up in his wheelchair, and feels this is necessary for his quality of life.  He does have an appropriate pressure-relief cushion.  He is very well versed in pressure relieving techniques  - Patient does not currently have follow up with Dr. Saini. If wound deteriorates or fails to improve can consider re-establishing with Dr. Saini for evaluation for coverage options. Patient does not want to pursue at this time and is happy with current conservative approach even with wound worsening.  - Known OM that has already been treated. No utility of Abx unless gross soft tissue infection which does not appear to be present today.    Wound care: Collagen, Hydrofiber silver strip, hydrofiber silver, Mepilex    2. Postoperative wound dehiscence, subsequent encounter  3. Osteomyelitis of left lower extremity  Comments: On 4/26/2022 patient underwent irrigation and debridement of multiple compartments of the left lower extremity states for pressure injury, with bone excision, and complex closure of chronic wound using biologic skin substitute.  During postop visit in surgeons office on 5/11, surgical site was noted to be dehisced.  Patient was referred to wound clinic for management.    7/21/2023: Wound largely unchanged  -Excisional debridement of wound in clinic today, medically necessary to promote wound healing.  -Patient to return to clinic weekly for assessment and debridement  -Home health to change dressing 1-2 times per week in between clinic visits OM.  -Suspect underlying OM, however patient does not wish to consider surgical options at this time nor does his surgeon wish to do any further surgery to this leg. 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 There is no gross soft tissue infection and repeated Abx treatment without source control is not indicated.    -This wound has waxed and waned  throughout treatment.  Continue to monitor  -Patient to be mindful of offloading when supine, should assure that his leg is not rotating medially  Wound care: Hydrofiber silver, silicone adhesive foam, tubigrip    3. Deep tissue injury  4. Pressure injury of left foot, stage IV  Comments: DTI to posterior left lower leg first observed in clinic 7/29/2022.  Patient does not know how it started, had been wearing heel float boot consistently.  Evolved into stage IV pressure injury    7/21/2023: Wound reoccurred. Wound is small with slough  - Excisional debridement was performed in clinic, medically necessary to promote wound healing.  - He is aware of the importance of offloading    Wound care: Silicone foam dressing to protect newly epithelialized tissue, Tubigrip D to manage edema    5. Pressure injury of left heel, stage 3 ([facility])  Comments: First noted in clinic on 10/21/2022, originally noted to be stage II    7/21/2023: Wound remains resolved, though covered with fragile epithelium.  High risk for recurrence due to presence of scar tissue, limited mobility and lack of sensation.  -Monitor each clinic visit, observe for deterioration  - Patient continue wearing heel float boots at all times  - Heel does not appear to contact any solid surfaces while in wheelchair   Wound Care: Heel Mepilex to reduce pressure and friction    6. Quadriplegia, C5-C7, incomplete ([facility])  Comments: Complicating factor.  Impaired mobility and sensation  -Patient is still spending 7-8 hours/day up in his wheelchair, though he does have appropriate offloading cushion, and knows to reposition frequently.  Wears heel float boots bilaterally at all times          Please note that this note may have been created using voice recognition software. I have worked with technical experts from [employer] to optimize the interface.  I have made every reasonable attempt to correct obvious errors, but there may be errors of grammar and possibly content  that I did not discover before finalizing the note.

## 2023-07-22 NOTE — PATIENT INSTRUCTIONS
-Keep your wound dressing clean, dry, and intact.    -Change your dressing if it becomes soiled, soaked, or falls off.    -Should you experience any significant changes in your wound(s), such as infection (redness, swelling, localized heat, increased pain, fever > 101 F, chills) or have any questions regarding your home care instructions, please contact the wound center at [phone]. If after hours, contact your primary care physician or go to the hospital emergency room.

## 2023-07-28 ENCOUNTER — OFFICE VISIT (OUTPATIENT)
Dept: WOUND CARE | Facility: MEDICAL CENTER | Age: 73
End: 2023-07-28
Attending: INTERNAL MEDICINE
Payer: MEDICARE

## 2023-07-28 VITALS
RESPIRATION RATE: 18 BRPM | OXYGEN SATURATION: 95 % | DIASTOLIC BLOOD PRESSURE: 60 MMHG | HEART RATE: 63 BPM | SYSTOLIC BLOOD PRESSURE: 97 MMHG | TEMPERATURE: 98.4 F

## 2023-07-28 DIAGNOSIS — L89.154 SACRAL DECUBITUS ULCER, STAGE IV (HCC): ICD-10-CM

## 2023-07-28 DIAGNOSIS — L89.623 PRESSURE INJURY OF LEFT HEEL, STAGE 3 (HCC): ICD-10-CM

## 2023-07-28 DIAGNOSIS — T81.31XD POSTOPERATIVE WOUND DEHISCENCE, SUBSEQUENT ENCOUNTER: ICD-10-CM

## 2023-07-28 DIAGNOSIS — L89.894 PRESSURE INJURY OF LEFT FOOT, STAGE 4 (HCC): ICD-10-CM

## 2023-07-28 PROCEDURE — 3078F DIAST BP <80 MM HG: CPT | Performed by: STUDENT IN AN ORGANIZED HEALTH CARE EDUCATION/TRAINING PROGRAM

## 2023-07-28 PROCEDURE — 11043 DBRDMT MUSC&/FSCA 1ST 20/<: CPT

## 2023-07-28 PROCEDURE — 3074F SYST BP LT 130 MM HG: CPT | Performed by: STUDENT IN AN ORGANIZED HEALTH CARE EDUCATION/TRAINING PROGRAM

## 2023-07-28 PROCEDURE — 11043 DBRDMT MUSC&/FSCA 1ST 20/<: CPT | Performed by: STUDENT IN AN ORGANIZED HEALTH CARE EDUCATION/TRAINING PROGRAM

## 2023-07-28 NOTE — PROGRESS NOTES
Provider Encounter- Pressure Injury        HISTORY OF PRESENT ILLNESS  Wound History:    START OF CARE IN CLINIC: 6/23/2023 (return to clinic after hospitalization)    REFERRING PROVIDER: Sahara Mendez      WOUNDS- Pressure Injury, Sacrococcygeal, stage IV                                                             Surgical wound dehiscence, left medial lower leg-status post surgical I&D of stage IV pressure injury and           biologic application                    Pressure injury, DTI, left posterior lower leg-first observed in clinic 7/29/2022       Pressure injury stage III L heel - first noted 10/21     Right 2nd toe avulsion - first noted 10/21     Skin tag left posterior ear - first noted 10/21     HISTORY: Patient with history of incomplete quadriplegia referred to [facility] for treatment of a stage IV pressure injury.  He has a history of previous pressure injuries to this area, and underwent muscle flaps in 2019, and then again in 2020.  He was seen in the wound clinic in November 2021 for an ulcer proximal from his current ulcer, and pressure injuries to his left posterior lower leg and left heel.  At that time, it was discovered that the patient had retained VAC foam embedded in the wound bed of the sacral wound.  Attempts were made to get him back to his plastic surgeon, though unsuccessful.  In January he underwent surgical removal of VAC sponge along with excisional debridement of his sacral wound by Dr. Chaves.  After the surgery, his wound went on to heal without incident.   In early April 2022, his home health nurse noted a new sacral ulcer, below the previous ulcer which quickly tripled in size over the following weeks.  The ulcer to his left medial lower leg had also deteriorated, with bone visible at the base..  He was hospitalized from 4/22 until 4/27/2022 and underwent surgery with Dr. Raman on 4/26 for irrigation and debridement of multiple compartments of the left lower extremity, bone  excision, and complex closure of chronic wound using biologic skin substitute.   His sacrococcygeal wound was not surgically addressed during this admission.  He was discharged back to his group home, with home health, and referral to outpatient wound clinic for his sacral wound.  He was instructed to follow-up with his surgeon for his lower leg wound.       Postoperatively, the left medial lower leg incision dehisced.  He was seen by his surgeon at [facility] on 5/11.  The surgeon opted to leave remaining sutures in place, and refer him to the wound clinic for treatment of this wound.   Treatment of this wound was initiated in clinic on 5/12.  During this visit was also noted that his heel DTI had resolved, but that he had a new pressure injury to his left posterior lower extremity.     A new pressure injury was noted to patient's right upper buttock/lower back on 5/20/2022.  Wound was linear in shape, skin discolored but intact.     Abrasion noted to left anterior lower leg.  First observed in clinic on 7/22/2022.  Patient states he bumped his leg into his food tray.     Small DTI noted to patient's left lateral lower leg on 7/29/2022.  Skin intact but discolored.     Large area of deep tissue injury noted to patient's left exterior lower leg.  Patient denied any trauma to this area.  Skin intact.  Wound documented.    1/27/2023: Patient was admitted to [facility] from 1/23-1/25/2023 with gross hematuria. He underwent RICHARD which showed watchman device was in place and he was taken off of Xarelto. While hospitalized wound team was consulted. He was referred back to [facility] and home health upon discharge.    Patient was hospitalized at [facility] for pyelonephritis from 2/26 until 3/2/2023, admitted for fever and general malaise.  He was admitted and initially started on linezolid and meropenem for suspected UTI and history of multidrug-resistant organisms.  Urine cultures were negative. ID was consulted, recommended CT of chest and  "abdomen,which were negative for acute findings. However, he was treated with 5 days total course of antibiotics for suspected UTI, and symptoms completely resolved.  During this admission, the inpatient wound team was consulted for treatment of his sacral and lower leg wounds.  A wound culture was taken from his left heel pressure ulcer, negative.  Once stabilized, he was discharged home and referred back to [facility] to resume treatment of his wounds.    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  Never used smokeless tobacco    Patient's problem list, allergies, and current medications reviewed and updated in Epic    Interval History:  Interval History thinned 7/29/2022.  Please see previous notes for complete interval history.     Interval History thinned 1/27/2023. Please see previous note for complete interval history.    Interval History thinned 3/3/2023.  Please see previous notes for complete interval history.      3/3/2023 : Clinic visit with ADILSON Arthur, FNPEGGY-BC, CWDINESHN, CFTRACI.   Patient returns to clinic after hospitalization for UTI.  He states that overall he is feeling well, denies fevers, chills, nausea, vomiting, cough or shortness of breath.    Per last wound clinic note, a loose bone fragment was extracted from the wound bed of his left medial leg wound and sent for pathology.  Histology report described, \"extensively degenerated fibrocartilaginous tissue\", suggestive of adjacent osteomyelitis.  X-ray of tib-fib ordered to assess for OM.    3/10/2023: Clinic visit with Steve Hodges MD. Patient reports feeling in normal state of health. He obtained Xray left tib-fib, " we do not have images, but report states no evidence of acute pathology such as OM. We discussed options for more aggressive treatment, patient would prefer to watch and wait. Left heel remains open since hospitalization despite using Prevalon boots, will prescribe Prafo boot for offloading. Sacrum measures slightly smaller, does not appear significantly changed.    3/24/2023: Clinic visit with Steve Hodges MD. Patient reports feeling well. He obtained PRAFO boot and left heel wound smaller. Rest of wounds are not significantly changed. New linear friction wound buttocks. Patient reports occurred when transitioning from laying to sitting with his low airloss mattress.    3/31/2023 : Clinic visit with Kelly Sandy, ADILSON, FNP-BC, CWDINESHN, CFCN.   Anjum presents today complaining of cold symptoms.  States he has had an upper respiratory illness for a couple of weeks now but feels he is getting better.  He does have a new tender area to his plantar foot with mild discoloration, possibly caused by seam on insole to PRAFO boot.  He has stopped wearing the PRAFO and is now wearing Prevalon boot, and feels this is improving.  The left medial lower leg wound does show some improvement, with decrease in area.  Sacral wound is about the same.  The left heel ulcer is again almost healed, with thin layer of epithelium noted to wound bed, scant drainage.    4/7/2023: Clinic visit with Steve Hodges MD. Patient reports doing well. Excited for seeing family for [place] and SO is coming to visit. Patient denies any symptoms of infection. His left medial leg wound is covered with thin, fragile epithelium and boggy due to poor quality of underlying tissue rather than abscess or fluid collection. Left plantar foot has opened slightly but appears improving, left heel is smaller. He continues to use Prevalon boots. Patient reports new abrasion to lateral right knee from rubbing on the dashboard, does not appear open. Home health has  been applying foam dressing for padding.    4/21/2023: Clinic visit with Steve Hodges MD. Patient reports feeling well, denies any symptoms of infection. He has been using Prevalon boots 24 hours a day, despite this, he has worsening left heel pressure injury. We discussed other methods and he will try to use pillow under leg to offload heel. We also applied an allyven dressing to plantar foot to reduce friction and pressure and he can try to use PRAFO boot. His heel does not appear to be in contact with any solid surface on his wheelchair. Other wounds are unchanged. Due to worsening left heel wound, will bring back to clinic next week for evaluation.    4/28/2023: Clinic visit with Steve Hodges MD. Patient reports feeling well, denies any fevers or chills. He has been using pillow under leg to completely offload heel at night and both heel / plantar foot wound improved. He reports with the nice weather he has been spending majority of his day up in chair outside. Unfortunately his sacral wound larger and bone palpable. He is unsure if he would want to be evaluated again by surgery for possible flap.    5/5/2023: Clinic visit with Steve Hodges MD. Patient denies any complaints today. He reports that he has spent less time in wheelchair in effort to improve offloading. Patients lower extremity wounds are fairly stable. Sacral wound appears stable with small partial thickness linear wound superior. He does not recall any specific injury.    5/12/2023 : Clinic visit with ADILSON Arthur, HOLDEN, IMAN, LILIYA.   Anjum continues to feel well overall.  His wounds continue to wax and wane.  He states that his low-air-loss bed was malfunctioning, was bottoming out, has since been repaired.  He continues to spend much of his day up in his wheelchair.    5/19/2023 : Clinic visit with ADILSON Arthur, HOLDEN, IMAN, LILIYA.   Anjum states he is feeling very well today.  Sacral wound about the same, lateral leg wound  continues to wax and wane.  Heel wound is improving steadily.  Plantar foot wound slowly progressing.    5/26/2023 : Clinic visit with ADILSON Arthur, HOLDEN, LILIYA VALERIO.   Continues to feel well.  His heel ulcer is healed, though covered with fragile epithelium.  Medial leg wound has deteriorated slightly.  Sacral and plantar foot wounds are both improved.    6/2/2023 : Clinic visit with ADILSON Arthur, HOLDEN, GEETHA VALERIO   Anjum informed us prior to today's visit that he tested positive for COVID earlier this week.  His symptoms have resolved, and he is wearing an N95 mask today, as am I and the wound nurse.  He states that today he is feeling well, denies fevers, chills, nausea, vomiting, cough or shortness of breath.    Unfortunately, his left medial lower leg wound has deteriorated significantly.  This has happened before, underlying osteomyelitis is suspected, however he does not wish to undergo amputation, nor does his surgeon wish to do so.  Tissue quality of this wound has been poor for some time.   His sacral wound is slowly progressing.  Plantar foot wound is also slowly getting better.   His left heel ulcer, previously resolved, presents today with discoloration.  Suspect DTI.  Will need to be monitored    6/9/2023: Clinic visit with Steve Hodges MD. Anjum reports feeling better, has completely recovered from COVID and denies any complaints. His left leg wounds worsening with new left posterior leg wound, possibly related to the suspected OM. His plantar foot wound with fascia or tendon exposed in base. He does not want any surgical intervention such as amputation. Sacral wound is stable.    6/16/2023 : Clinic visit with ADILSON Arthur, HOLDEN, GEETHA VALERIO   Anjum states he is feeling very well.  His posterior leg wounds have again healed, though covered with thin epithelium.  Medial leg wound and plantar foot wound have both improved.  Sacral wound about the same.    6/23/2023: Clinic  visit with Steve Hodges MD. Patient returns to clinic following recent hospitalization for fever. He was treated with Abx and suprapubic catheter was exchanged. His left leg wounds are improved. Sacrum measuring larger.    6/30/2023 : Clinic visit with ADILSON Arthur, HOLDEN, IMAN, LILIYA.   Anjum continues to feel well.  He is heel ulcer is healed, and plantar foot wound is nearly resolved.  Unfortunately, he has a reopening of the posterior leg wound.  Sacral wound measures about the same.  Medial leg wound continues to wax and wane with poor tissue quality.    7/14/2023 : Clinic visit with ADILSON Arthur, HOLDEN, IMAN, CFTRACI.   Anjum states he is feeling well today.  He is left heel ulcer remains resolved, though covered with friable epithelium.  Plantar foot ulcer is now healed.  Unfortunately, medial left lower leg ulcers continue to deteriorate, with poor tissue quality.  Posterior wound is also deteriorated.  Sacral ulcer is about the same.  He reiterated that he does not want to consider surgery of any sort at this time.    7/21/2023: Clinic visit with Steve Hodges MD. Patient reports feeling well, denies any complaints. He denies any fevers or chills. Heel ulcer remains resolved. Foot wound and sacrum is stable. Lower leg ulcers appear about the same.    7/28/2023: Clinic visit with Steve Hodges MD. Patient reports being in normal state of health. Denies any current issues. His lower wounds are largely unchanged. Sacrum continues to enlarge, he reports that he has been up in chair more frequently this week. Patient counseled on risks of enlarging pressure injury including risk that wound may progress, known OM may worsen or expand including causing illness. He is aware of risks and possible other treatment options, he would like to proceed with current treatment.    REVIEW OF SYSTEMS:   Unchanged from previous wound clinic assessment on 7/21/2023, except as noted in interval history above      PHYSICAL EXAMINATION:   BP 97/60   Pulse 63   Temp 36.9 °C (98.4 °F) (Temporal)   Resp 18   SpO2 95%   Physical Exam  Constitutional:       Appearance: He is obese.   Cardiovascular:      Rate and Rhythm: Normal rate.   Pulmonary:      Effort: Pulmonary effort is normal.   Abdominal:      Comments: Colostomy left lower quadrant   Genitourinary:     Comments: Suprapubic catheter to down drain   Skin:     Comments: Stage IV coccygeal pressure injury - Measuring larger with increased undermining, thin layer of tissue over bone, thin layer of slough to wound bed, moderate serosanguineous drainage, periwound is without erythema or induration, there is no wound odor    Full-thickness wound to left medial lower leg - Stable, poor tissue quality, minimal to moderate serosanguineous drainage, no evidence of infection    Stage III pressure injury left posterior heel -remains resolved.  Epithelium intact though friable    Stage IV pressure injury plantar foot - Stable, thin slough    Stage III pressure injury to posterior left lower leg, recurring-shallow stage III,  wound area has decreased since last assessment, poor tissue quality, minimal to moderate serosanguineous drainage, no evidence of infection      Refer to wound flowsheet and photos   Neurological:      Mental Status: He is alert and oriented to person, place, and time.   Psychiatric:         Mood and Affect: Mood normal.         WOUND ASSESSMENT  Wound 06/18/23 Pressure Injury Coccyx Stage IV (Active)   Wound Image    07/28/23 1500   Site Assessment Red;[place] 07/28/23 1500   Periwound Assessment Blanchable erythema 07/28/23 1500   Margins Unattached edges 07/28/23 1500   Drainage Amount Large 07/28/23 1500   Drainage Description Thick;Serosanguineous 07/28/23 1500   Treatments Cleansed;Provider debridement;Site care 07/28/23 1500   Wound Cleansing Hypochlorus Acid 07/28/23 1500   Periwound Protectant Skin Protectant Wipes to Periwound;Barrier Paste 07/28/23  1500   Dressing Changed Changed 07/28/23 1500   Dressing Cleansing/Solutions Normal Saline 07/28/23 1500   Dressing Options Collagen Dressing;Hydrofiber Silver;Other (Comments) 07/28/23 1500   Dressing Change/Treatment Frequency Every 72 hrs, and As Needed 07/28/23 1500   WOUND NURSE ONLY - Pressure Injury Stage 4 07/28/23 1500   Wound Length (cm) 4 cm 07/28/23 1500   Wound Width (cm) 1.8 cm 07/28/23 1500   Wound Depth (cm) 0.9 cm 07/28/23 1500   Wound Surface Area (cm^2) 7.2 cm^2 07/28/23 1500   Wound Volume (cm^3) 6.48 cm^3 07/28/23 1500   Post-Procedure Length (cm) 4 cm 07/28/23 1500   Post-Procedure Width (cm) 1.9 cm 07/28/23 1500   Post-Procedure Depth (cm) 1 cm 07/28/23 1500   Post-Procedure Surface Area (cm^2) 7.6 cm^2 07/28/23 1500   Post-Procedure Volume (cm^3) 7.6 cm^3 07/28/23 1500   Wound Healing % -78 07/28/23 1500   Tunneling (cm) 0 cm 07/21/23 1600   Tunneling Clock Position of Wound 12 05/19/23 1400   Undermining (cm) 1 cm 07/28/23 1500   Undermining of Wound, 1st Location From 6 o'clock;To 8 o'clock 07/28/23 1500   Undermining (cm) - 2nd location 3.9 cm 07/28/23 1500   Undermining of Wound, 2nd Location From 12 o'clock;To 2 o'clock 07/28/23 1500   Wound Odor None 07/28/23 1500   Exposed Structures Other (Comments) 07/21/23 1600   Number of days: 41       Wound 06/18/23 Full Thickness Wound Leg Medial Left (Active)   Wound Image   07/28/23 1500   Site Assessment Red;Purple;Yellow;Fragile 07/28/23 1500   Periwound Assessment Dry;Fragile 07/28/23 1500   Margins Unattached edges 07/28/23 1500   Drainage Amount Moderate 07/28/23 1500   Drainage Description Serosanguineous 07/28/23 1500   Treatments Cleansed;Site care 07/28/23 1500   Wound Cleansing Hypochlorus Acid 07/28/23 1500   Periwound Protectant Skin Protectant Wipes to Periwound 07/28/23 1500   Dressing Changed Changed 07/28/23 1500   Dressing Cleansing/Solutions Not Applicable 07/28/23 1500   Dressing Options Hydrofiber Silver;Other  (Comments);Tubigrip 07/28/23 1500   Dressing Change/Treatment Frequency Every 72 hrs, and As Needed 07/28/23 1500   Non-staged Wound Description Full thickness 07/28/23 1500   Wound Length (cm) 4 cm 07/28/23 1500   Wound Width (cm) 3 cm 07/28/23 1500   Wound Depth (cm) 0.3 cm 07/28/23 1500   Wound Surface Area (cm^2) 12 cm^2 07/28/23 1500   Wound Volume (cm^3) 3.6 cm^3 07/28/23 1500   Post-Procedure Length (cm) 4 cm 07/28/23 1500   Post-Procedure Width (cm) 3 cm 07/28/23 1500   Post-Procedure Depth (cm) 0.3 cm 07/28/23 1500   Post-Procedure Surface Area (cm^2) 12 cm^2 07/28/23 1500   Post-Procedure Volume (cm^3) 3.6 cm^3 07/28/23 1500   Wound Healing % -1100 07/28/23 1500   Tunneling (cm) 0 cm 07/28/23 1500   Undermining (cm) 0 cm 07/28/23 1500   Undermining of Wound, 1st Location From 1 o'clock;To 2 o'clock 06/02/23 1600   Undermining (cm) - 2nd location 1.3 cm 06/02/23 1600   Undermining of Wound, 2nd Location From 3 o'clock;To 4 o'clock 06/02/23 1600   Wound Odor None 07/28/23 1500   Exposed Structures None 07/28/23 1500   Number of days: 41       Wound 06/18/23 Pressure Injury Foot Lateral Left POA resolving stage IV (Active)   Wound Image    07/28/23 1500   Site Assessment Red;[place] 07/28/23 1500   Periwound Assessment Callused;Flaky;Fragile 07/28/23 1500   Margins Attached edges 07/28/23 1500   Closure Secondary intention 07/28/23 1500   Drainage Amount Small 07/28/23 1500   Drainage Description Serosanguineous 07/28/23 1500   Treatments Cleansed;Provider debridement;Site care 07/28/23 1500   Wound Cleansing Hypochlorus Acid 07/28/23 1500   Periwound Protectant Skin Protectant Wipes to Periwound 07/28/23 1500   Dressing Changed Changed 07/28/23 1500   Dressing Cleansing/Solutions Not Applicable 07/28/23 1500   Dressing Options Hydrofiber Silver;Silicone Adhesive Foam;Tubigrip 07/28/23 1500   Dressing Change/Treatment Frequency Every 72 hrs, and As Needed 07/28/23 1500   WOUND NURSE ONLY - Pressure Injury Stage  4 07/28/23 1500   Wound Length (cm) 0.2 cm 07/28/23 1500   Wound Width (cm) 0.3 cm 07/28/23 1500   Wound Depth (cm) 0.2 cm 07/28/23 1500   Wound Surface Area (cm^2) 0.06 cm^2 07/28/23 1500   Wound Volume (cm^3) 0.012 cm^3 07/28/23 1500   Post-Procedure Length (cm) 0.3 cm 07/28/23 1500   Post-Procedure Width (cm) 0.4 cm 07/28/23 1500   Post-Procedure Depth (cm) 0.3 cm 07/28/23 1500   Post-Procedure Surface Area (cm^2) 0.12 cm^2 07/28/23 1500   Post-Procedure Volume (cm^3) 0.036 cm^3 07/28/23 1500   Wound Healing % 78 07/28/23 1500   Tunneling (cm) 0 cm 07/28/23 1500   Undermining (cm) 0 cm 07/28/23 1500   Wound Odor None 07/28/23 1500   Exposed Structures None 07/28/23 1500   Number of days: 41       Wound 06/18/23 Full Thickness Wound Leg Posterior Left (Active)   Wound Image   07/28/23 1500   Site Assessment Pink;Red 07/28/23 1500   Periwound Assessment Blanchable erythema;Fragile;Flaky;Edema 07/28/23 1500   Margins Attached edges 07/28/23 1500   Closure Secondary intention 07/28/23 1500   Drainage Amount Moderate 07/28/23 1500   Drainage Description Serosanguineous 07/28/23 1500   Treatments Cleansed;Site care 07/28/23 1500   Wound Cleansing Hypochlorus Acid 07/28/23 1500   Periwound Protectant Skin Protectant Wipes to Periwound 07/28/23 1500   Dressing Changed Changed 07/28/23 1500   Dressing Cleansing/Solutions Not Applicable 07/28/23 1500   Dressing Options Hydrofiber Silver;Other (Comments);Tubigrip 07/28/23 1500   Dressing Change/Treatment Frequency Every 72 hrs, and As Needed 07/28/23 1500   Non-staged Wound Description Full thickness 07/28/23 1500   Wound Length (cm) 0.5 cm 07/28/23 1500   Wound Width (cm) 0.3 cm 07/28/23 1500   Wound Depth (cm) 0.1 cm 07/28/23 1500   Wound Surface Area (cm^2) 0.15 cm^2 07/28/23 1500   Wound Volume (cm^3) 0.015 cm^3 07/28/23 1500   Post-Procedure Length (cm) 0.5 cm 07/28/23 1500   Post-Procedure Width (cm) 0.3 cm 07/28/23 1500   Post-Procedure Depth (cm) 0.1 cm 07/28/23  1500   Post-Procedure Surface Area (cm^2) 0.15 cm^2 07/28/23 1500   Post-Procedure Volume (cm^3) 0.015 cm^3 07/28/23 1500   Wound Healing % 99 07/28/23 1500   Tunneling (cm) 0 cm 07/28/23 1500   Undermining (cm) 0 cm 07/28/23 1500   Wound Odor None 07/28/23 1500   Exposed Structures None 07/28/23 1500   Number of days: 41       PROCEDURE: Debridement of sacral / coccygeal pressure injury  -2% viscous lidocaine applied topically to wound bed for approximately 5 minutes prior to debridement  -Curette used to debride wound bed.  Excisional debridement was performed to remove devitalized tissue until healthy, bleeding tissue was visualized.   Entire surface of wound, 7.6 cm² debrided.  Tissue debrided into the muscle / fascia layer.    -Bleeding controlled with manual pressure.    -Wound care completed by wound RN, refer to flowsheet  -Patient tolerated the procedure well, without c/o pain or discomfort.        BIOLOGIC LOG  5/20/2022-first application.  PRODUCT: EpiCord 2 x 3 cm . PRODUCT #[id number]; EXPIRES: 2026-12-01 5/27/2022- second application.  PRODUCT: EpiCordEX 2 x 3 cm . PRODUCT #[id number]; EXPIRES: 2026-09-01    6/3/2022- third application.  PRODUCT: EpiCordEX 2 x 3 cm . PRODUCT #[id number]; EXPIRES: 2026-10-01    6/10/2022- fourth application.  PRODUCT: EpiCordEX 2 x 3 cm . PRODUCT #[id number]; EXPIRES: 2026-09-01 6/17/2022- fifth application.  PRODUCT: EpiCordEX 2 x 3 cm . PRODUCT #[id number]; EXPIRES: 2027-02-01 6/24/2022- sixth application.  PRODUCT: EpiCordEX 2 x 3 cm . PRODUCT #[id number]; EXPIRES: 2027-02-01 07/01/22: Seventh application.  Product: Epicort Dx 2.0 x 3.0 cm reorder number [id number]; product number [id number]; expires 2027-02-01 7/22/2022- eighth application.  PRODUCT: EpiCord 2 x 3 cm, reorder #[id number]. PRODUCT #[id number]; EXPIRES: 2027-02-01      Pertinent Labs and Diagnostics:    Labs:     A1c: No results  found for: HBA1C     [facility] results, 7/1/2022 (under media tab)    CRP 13    ESR 31      IMAGING:     10/3/2022-CT of pelvis with contrast  IMPRESSION:     1.  Posterior soft tissue sacral wound and 1.5 x 3.7 cm abscess.   2.  Progressive destruction of the distal sacrum and proximal coccyx. Sclerosis and irregularity of the distal sacrum consistent with osteomyelitis.   3.  Old wound within the soft tissues posterior to the distal left SI joint with possible fistulous communication.    10/3/2022-CT of tib-fib with and without contrast, with reconstruction  IMPRESSION:     1.  Old fractures of the left tibia and fibula with extensive callus formation and bony bridging as well as extensive dystrophic calcifications. Similar to previous.   2.  Distal medial soft tissue wounds with ill-defined superficial in the soft tissue thickening and fluid collections suggestive of phlegmon. No organized abscess identified.   3.  No definite osteomyelitis.   4.  Arthritic changes.        VASCULAR STUDIES: No results found.    LAST  WOUND CULTURE:   Lab Results   Component Value Date/Time    CULTRSULT  06/17/2023 09:43 PM     No growth after 5 days of incubation.  Blood culture testing and Gram stain, if indicated, are  performed at [facility], [address] [place] [address].  Positive blood cultures are  sent to [facility], [address] [place] [address], for organism identification and  susceptibility testing.        PATHOLOGY  2/17/2023-bone fragment extracted from left lower extremity wound\\  FINAL DIAGNOSIS:     A. Left leg bone fragment at base of chronic wound:          Extensively degenerated fibrocartilaginous tissue with a rim of           fibrinopurulent debris          Correlate with culture findings            Comment: While no residual intact bone is identified, these           findings are suggestive of adjacent osteomyelitis.      ASSESSMENT AND PLAN:     1.  Sacral decubitus ulcer, stage IV ([facility])  Comments: Ulcer first noted in early April 2022 as small open area which quickly enlarged.  This ulcer is present distal from previous sacral ulcer which healed after surgery in January.  Patient has history of flap reconstruction x2 to this area.  CT shows progressive destruction of distal sacrum and proximal coccyx.    7/28/2023: Wound appears slowly worsening with increased undermining  - Excisional debridement was performed in clinic, medically necessary to promote wound healing.   -Home health to change dressing 1-2 times per week in between clinic visits  -Patient does spend a lot of time up in his wheelchair, and feels this is necessary for his quality of life.  He does have an appropriate pressure-relief cushion.  He is very well versed in pressure relieving techniques  - Patient does not currently have follow up with Dr. Saini. If wound deteriorates or fails to improve can consider re-establishing with Dr. Saini for evaluation for coverage options. Patient does not want to pursue at this time and is happy with current conservative approach even with wound worsening.  - Known OM that has already been treated. No utility of Abx unless gross soft tissue infection which does not appear to be present today.    Wound care: Collagen, Hydrofiber silver strip, hydrofiber silver, Mepilex    2. Postoperative wound dehiscence, subsequent encounter  3. Osteomyelitis of left lower extremity  Comments: On 4/26/2022 patient underwent irrigation and debridement of multiple compartments of the left lower extremity states for pressure injury, with bone excision, and complex closure of chronic wound using biologic skin substitute.  During postop visit in surgeons office on 5/11, surgical site was noted to be dehisced.  Patient was referred to wound clinic for management.    7/28/2023: Wound largely unchanged  -Patient to return to clinic weekly for assessment and debridement  -Home  health to change dressing 1-2 times per week in between clinic visits OM.  -Suspect underlying OM, however patient does not wish to consider surgical options at this time nor does his surgeon wish to do any further surgery to this leg. 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 There is no gross soft tissue infection and repeated Abx treatment without source control is not indicated.    -This wound has waxed and waned throughout treatment.  Continue to monitor  -Patient to be mindful of offloading when supine, should assure that his leg is not rotating medially  Wound care: Hydrofiber silver, silicone adhesive foam, tubigrip    3. Deep tissue injury  4. Pressure injury of left foot, stage IV  Comments: DTI to posterior left lower leg first observed in clinic 7/29/2022.  Patient does not know how it started, had been wearing heel float boot consistently.  Evolved into stage IV pressure injury    7/28/2023: Wound stable. Wound is small with slough  - He is aware of the importance of offloading    Wound care: Silicone foam dressing to protect newly epithelialized tissue, Tubigrip D to manage edema    5. Pressure injury of left heel, stage 3 ([facility])  Comments: First noted in clinic on 10/21/2022, originally noted to be stage II    7/28/2023: Wound remains resolved, though covered with fragile epithelium.  High risk for recurrence due to presence of scar tissue, limited mobility and lack of sensation.  -Monitor each clinic visit, observe for deterioration  - Patient continue wearing heel float boots at all times  - Heel does not appear to contact any solid surfaces while in wheelchair   Wound Care: Heel Mepilex to reduce pressure and friction    6. Quadriplegia, C5-C7, incomplete ([facility])  Comments: Complicating factor.  Impaired mobility and sensation  -Patient is still spending 7-8 hours/day up in his wheelchair, though he does have appropriate offloading cushion, and knows to reposition  frequently.  Wears heel float boots bilaterally at all times          Please note that this note may have been created using voice recognition software. I have worked with technical experts from [facility] to optimize the interface.  I have made every reasonable attempt to correct obvious errors, but there may be errors of grammar and possibly content that I did not discover before finalizing the note.

## 2023-07-29 NOTE — PROGRESS NOTES
Home Health order sent to [facility] via [facility] [phone].

## 2023-07-29 NOTE — PATIENT INSTRUCTIONS
-Keep your wound dressing clean, dry, and intact.    -Change your dressing if it becomes soiled, soaked, or falls off.    -Should you experience any significant changes in your wound(s), such as infection (redness, swelling, localized heat, increased pain, fever > 101 F, chills) or have any questions regarding your home care instructions, please contact the wound center at [phone]. If after hours, contact your primary care physician or go to the hospital emergency room.

## 2023-08-04 ENCOUNTER — OFFICE VISIT (OUTPATIENT)
Dept: WOUND CARE | Facility: MEDICAL CENTER | Age: 73
End: 2023-08-04
Attending: INTERNAL MEDICINE
Payer: MEDICARE

## 2023-08-04 VITALS
HEART RATE: 54 BPM | SYSTOLIC BLOOD PRESSURE: 122 MMHG | OXYGEN SATURATION: 94 % | DIASTOLIC BLOOD PRESSURE: 63 MMHG | RESPIRATION RATE: 18 BRPM | TEMPERATURE: 98.1 F

## 2023-08-04 DIAGNOSIS — M86.9 OSTEOMYELITIS OF LEFT LOWER EXTREMITY (HCC): ICD-10-CM

## 2023-08-04 DIAGNOSIS — T81.31XD POSTOPERATIVE WOUND DEHISCENCE, SUBSEQUENT ENCOUNTER: ICD-10-CM

## 2023-08-04 DIAGNOSIS — T14.8XXA DEEP TISSUE INJURY: ICD-10-CM

## 2023-08-04 DIAGNOSIS — G82.54 QUADRIPLEGIA, C5-C7 INCOMPLETE (HCC): ICD-10-CM

## 2023-08-04 DIAGNOSIS — L89.154 SACRAL DECUBITUS ULCER, STAGE IV (HCC): ICD-10-CM

## 2023-08-04 DIAGNOSIS — L89.623 PRESSURE INJURY OF LEFT HEEL, STAGE 3 (HCC): ICD-10-CM

## 2023-08-04 DIAGNOSIS — L89.894 PRESSURE INJURY OF LEFT FOOT, STAGE 4 (HCC): ICD-10-CM

## 2023-08-04 PROCEDURE — 11043 DBRDMT MUSC&/FSCA 1ST 20/<: CPT

## 2023-08-04 PROCEDURE — 3074F SYST BP LT 130 MM HG: CPT | Performed by: NURSE PRACTITIONER

## 2023-08-04 PROCEDURE — 3078F DIAST BP <80 MM HG: CPT | Performed by: NURSE PRACTITIONER

## 2023-08-04 PROCEDURE — 11043 DBRDMT MUSC&/FSCA 1ST 20/<: CPT | Performed by: NURSE PRACTITIONER

## 2023-08-04 PROCEDURE — 11042 DBRDMT SUBQ TIS 1ST 20SQCM/<: CPT

## 2023-08-04 NOTE — PROGRESS NOTES
Wound care order routed to [facility] via [employer].

## 2023-08-04 NOTE — PATIENT INSTRUCTIONS
Should you experience any significant changes in your wound(s) such as infection (redness, swelling, localized heat, increased pain, fever >101 F, chills) or have any questions regarding your home care instructions, please contact the wound center [phone]. If after hours, contact your primary care physician or go the hospital emergency room.  Keep dressing clean and dry and cover while bathing. Only change dressing if over saturated, soiled or its falling off.

## 2023-08-04 NOTE — PROGRESS NOTES
Provider Encounter- Pressure Injury        HISTORY OF PRESENT ILLNESS  Wound History:    START OF CARE IN CLINIC: 6/23/2023 (return to clinic after hospitalization)    REFERRING PROVIDER: Sahara Mendez      WOUNDS- Pressure Injury, Sacrococcygeal, stage IV                                                             Surgical wound dehiscence, left medial lower leg-status post surgical I&D of stage IV pressure injury and           biologic application                    Pressure injury, DTI, left posterior lower leg-first observed in clinic 7/29/2022       Pressure injury stage III L heel - first noted 10/21     Right 2nd toe avulsion - first noted 10/21     Skin tag left posterior ear - first noted 10/21     HISTORY: Patient with history of incomplete quadriplegia referred to [facility] for treatment of a stage IV pressure injury.  He has a history of previous pressure injuries to this area, and underwent muscle flaps in 2019, and then again in 2020.  He was seen in the wound clinic in November 2021 for an ulcer proximal from his current ulcer, and pressure injuries to his left posterior lower leg and left heel.  At that time, it was discovered that the patient had retained VAC foam embedded in the wound bed of the sacral wound.  Attempts were made to get him back to his plastic surgeon, though unsuccessful.  In January he underwent surgical removal of VAC sponge along with excisional debridement of his sacral wound by Dr. Chaves.  After the surgery, his wound went on to heal without incident.   In early April 2022, his home health nurse noted a new sacral ulcer, below the previous ulcer which quickly tripled in size over the following weeks.  The ulcer to his left medial lower leg had also deteriorated, with bone visible at the base..  He was hospitalized from 4/22 until 4/27/2022 and underwent surgery with Dr. Raman on 4/26 for irrigation and debridement of multiple compartments of the left lower extremity, bone  excision, and complex closure of chronic wound using biologic skin substitute.   His sacrococcygeal wound was not surgically addressed during this admission.  He was discharged back to his group home, with home health, and referral to outpatient wound clinic for his sacral wound.  He was instructed to follow-up with his surgeon for his lower leg wound.       Postoperatively, the left medial lower leg incision dehisced.  He was seen by his surgeon at [facility] on 5/11.  The surgeon opted to leave remaining sutures in place, and refer him to the wound clinic for treatment of this wound.   Treatment of this wound was initiated in clinic on 5/12.  During this visit was also noted that his heel DTI had resolved, but that he had a new pressure injury to his left posterior lower extremity.     A new pressure injury was noted to patient's right upper buttock/lower back on 5/20/2022.  Wound was linear in shape, skin discolored but intact.     Abrasion noted to left anterior lower leg.  First observed in clinic on 7/22/2022.  Patient states he bumped his leg into his food tray.     Small DTI noted to patient's left lateral lower leg on 7/29/2022.  Skin intact but discolored.     Large area of deep tissue injury noted to patient's left exterior lower leg.  Patient denied any trauma to this area.  Skin intact.  Wound documented.    1/27/2023: Patient was admitted to [facility] from 1/23-1/25/2023 with gross hematuria. He underwent RICHARD which showed watchman device was in place and he was taken off of Xarelto. While hospitalized wound team was consulted. He was referred back to [facility] and home health upon discharge.    Patient was hospitalized at [facility] for pyelonephritis from 2/26 until 3/2/2023, admitted for fever and general malaise.  He was admitted and initially started on linezolid and meropenem for suspected UTI and history of multidrug-resistant organisms.  Urine cultures were negative. ID was consulted, recommended CT of chest and  abdomen,which were negative for acute findings. However, he was treated with 5 days total course of antibiotics for suspected UTI, and symptoms completely resolved.  During this admission, the inpatient wound team was consulted for treatment of his sacral and lower leg wounds.  A wound culture was taken from his left heel pressure ulcer, negative.  Once stabilized, he was discharged home and referred back to [facility] to resume treatment of his wounds.    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  Never used smokeless tobacco    Patient's problem list, allergies, and current medications reviewed and updated in Epic    Interval History:  Interval History thinned 7/29/2022.  Please see previous notes for complete interval history.     Interval History thinned 1/27/2023. Please see previous note for complete interval history.    Interval History thinned 3/3/2023.  Please see previous notes for complete interval history.      Interval History thinned 8/4/2023.  Please see previous notes for complete interval history.      7/28/2023: Clinic visit with Steve Hodges MD. Patient reports being in normal state of health. Denies any current issues. His lower wounds are largely unchanged. Sacrum continues to enlarge, he reports that he has been up in chair more frequently this week. Patient counseled on risks of enlarging pressure injury including risk that wound may progress, known OM may worsen or expand including causing illness. He is aware of risks and possible other treatment options, he would like to proceed with current treatment.    8/4/2023 : Clinic visit with ADILSON Arthur, FNP-BC,  CWOCN, CFCN.  Turk states he is feeling well overall.  Left medial lower leg wound has decreased in area significantly.  However, a new area has opened just below sacral ulcer,  from an ulcer by thin skin bridge.  Patient denies any changes to his usual activity.  His left heel wound remains healed, however he does have some slightly denuded skin to the heel.  He was seen by his podiatrist earlier this week, dressing was applied, possibly causing slight maceration.    REVIEW OF SYSTEMS:   Unchanged from previous wound clinic assessment on 7/28/2023, except as noted in interval history above     PHYSICAL EXAMINATION:   /63   Pulse (!) 54   Temp 36.7 °C (98.1 °F) (Temporal)   Resp 18   SpO2 94%   Physical Exam  Constitutional:       Appearance: He is obese.   Cardiovascular:      Rate and Rhythm: Normal rate.   Pulmonary:      Effort: Pulmonary effort is normal.   Abdominal:      Comments: Colostomy left lower quadrant   Genitourinary:     Comments: Suprapubic catheter to down drain   Skin:     Comments: Stage IV coccygeal pressure injury -new open area just below main ulcer,  by thin skin bridge.  Suspect this is a reopening of previous area of skin damage.  Wound still probes close to bone, moderate serosanguineous drainage, thin layer of slough to wound bed, no evidence of infection.  Full-thickness wound to left medial lower leg -wound area has decreased, tissue quality remains poor, minimal to moderate serosanguineous drainage, no evidence of infection    Stage III pressure injury left posterior heel -remains resolved.  Slight maceration of skin to lateral medial heel, though skin intact  Stage IV pressure injury plantar foot -covered with thin callus, no drainage    Stage III pressure injury to posterior left lower leg, recurring-shallow stage III-wound area continues to wax and wane, minimal to moderate serosanguineous drainage, no evidence of infection.         Neurological:       Mental Status: He is alert and oriented to person, place, and time.   Psychiatric:         Mood and Affect: Mood normal.         WOUND ASSESSMENT  Wound 06/18/23 Pressure Injury Coccyx Stage IV (Active)   Wound Image    08/04/23 1500   Site Assessment Red;Pink;Fragile 08/04/23 1500   Periwound Assessment Blanchable erythema;Scar tissue 08/04/23 1500   Margins Unattached edges 08/04/23 1500   Drainage Amount Large 08/04/23 1500   Drainage Description Serosanguineous 08/04/23 1500   Treatments Cleansed;Provider debridement;Site care 08/04/23 1500   Wound Cleansing Hypochlorus Acid 08/04/23 1500   Periwound Protectant Skin Protectant Wipes to Periwound;Barrier Paste 08/04/23 1500   Dressing Changed Changed 08/04/23 1500   Dressing Cleansing/Solutions Normal Saline 08/04/23 1500   Dressing Options Collagen Dressing;Hydrofiber Silver;Other (Comments) 08/04/23 1500   Dressing Change/Treatment Frequency Every 72 hrs, and As Needed 08/04/23 1500   Wound Team Following Weekly 08/04/23 1500   WOUND NURSE ONLY - Pressure Injury Stage 4 08/04/23 1500   Wound Length (cm) 4 cm 08/04/23 1500   Wound Width (cm) 2 cm 08/04/23 1500   Wound Depth (cm) 0.8 cm 08/04/23 1500   Wound Surface Area (cm^2) 8 cm^2 08/04/23 1500   Wound Volume (cm^3) 6.4 cm^3 08/04/23 1500   Post-Procedure Length (cm) 4.1 cm 08/04/23 1500   Post-Procedure Width (cm) 2 cm 08/04/23 1500   Post-Procedure Depth (cm) 0.8 cm 08/04/23 1500   Post-Procedure Surface Area (cm^2) 8.2 cm^2 08/04/23 1500   Post-Procedure Volume (cm^3) 6.56 cm^3 08/04/23 1500   Wound Healing % -76 08/04/23 1500   Tunneling (cm) 0 cm 08/04/23 1500   Tunneling Clock Position of Wound 12 05/19/23 1400   Undermining (cm) 0.8 cm 08/04/23 1500   Undermining of Wound, 1st Location From 6 o'clock;To 8 o'clock 08/04/23 1500   Undermining (cm) - 2nd location 3.2 cm 08/04/23 1500   Undermining of Wound, 2nd Location From 12 o'clock;To 2 o'clock 08/04/23 1500   Wound Odor None 08/04/23 1500   Exposed  Structures Other (Comments) 08/04/23 1500   Number of days: 47       Wound 06/18/23 Full Thickness Wound Leg Medial Left (Active)   Wound Image   08/04/23 1500   Site Assessment Red;Purple;Boggy;Fragile 08/04/23 1500   Periwound Assessment Fragile;Flaky;Dry;Edema;Blanchable erythema 08/04/23 1500   Margins Unattached edges 08/04/23 1500   Drainage Amount Moderate 08/04/23 1500   Drainage Description Serosanguineous 08/04/23 1500   Treatments Cleansed;Site care 08/04/23 1500   Wound Cleansing Normal Saline Irrigation 08/04/23 1500   Periwound Protectant Skin Protectant Wipes to Periwound;Barrier Paste 08/04/23 1500   Dressing Changed Changed 08/04/23 1500   Dressing Cleansing/Solutions Not Applicable 08/04/23 1500   Dressing Options Hydrofiber Silver;Other (Comments);Tubigrip 08/04/23 1500   Dressing Change/Treatment Frequency Every 72 hrs, and As Needed 08/04/23 1500   Wound Team Following Weekly 08/04/23 1500   Non-staged Wound Description Full thickness 08/04/23 1500   Wound Length (cm) 2 cm 08/04/23 1500   Wound Width (cm) 2.5 cm 08/04/23 1500   Wound Depth (cm) 0.4 cm 08/04/23 1500   Wound Surface Area (cm^2) 5 cm^2 08/04/23 1500   Wound Volume (cm^3) 2 cm^3 08/04/23 1500   Post-Procedure Length (cm) 4 cm 07/28/23 1500   Post-Procedure Width (cm) 3 cm 07/28/23 1500   Post-Procedure Depth (cm) 0.3 cm 07/28/23 1500   Post-Procedure Surface Area (cm^2) 12 cm^2 07/28/23 1500   Post-Procedure Volume (cm^3) 3.6 cm^3 07/28/23 1500   Wound Healing % -567 08/04/23 1500   Tunneling (cm) 0 cm 08/04/23 1500   Undermining (cm) 0 cm 08/04/23 1500   Undermining of Wound, 1st Location From 1 o'clock;To 2 o'clock 06/02/23 1600   Undermining (cm) - 2nd location 1.3 cm 06/02/23 1600   Undermining of Wound, 2nd Location From 3 o'clock;To 4 o'clock 06/02/23 1600   Wound Odor None 08/04/23 1500   Exposed Structures None 08/04/23 1500   Number of days: 47       Wound 06/18/23 Full Thickness Wound Leg Posterior Left (Active)   Wound  Image   08/04/23 1500   Site Assessment Pink;Red;Fragile 08/04/23 1500   Periwound Assessment Blanchable erythema;Fragile;Flaky;Edema 08/04/23 1500   Margins Attached edges 08/04/23 1500   Closure Secondary intention 07/28/23 1500   Drainage Amount Moderate 08/04/23 1500   Drainage Description Serosanguineous 08/04/23 1500   Treatments Cleansed;Site care 08/04/23 1500   Wound Cleansing Normal Saline Irrigation 08/04/23 1500   Periwound Protectant Skin Protectant Wipes to Periwound;Barrier Paste 08/04/23 1500   Dressing Changed Changed 08/04/23 1500   Dressing Cleansing/Solutions Not Applicable 08/04/23 1500   Dressing Options Hydrofiber Silver;Other (Comments);Tubigrip 08/04/23 1500   Dressing Change/Treatment Frequency Every 72 hrs, and As Needed 08/04/23 1500   Wound Team Following Weekly 08/04/23 1500   Non-staged Wound Description Full thickness 08/04/23 1500   Wound Length (cm) 1 cm 08/04/23 1500   Wound Width (cm) 0.5 cm 08/04/23 1500   Wound Depth (cm) 0.2 cm 08/04/23 1500   Wound Surface Area (cm^2) 0.5 cm^2 08/04/23 1500   Wound Volume (cm^3) 0.1 cm^3 08/04/23 1500   Post-Procedure Length (cm) 0.5 cm 07/28/23 1500   Post-Procedure Width (cm) 0.3 cm 07/28/23 1500   Post-Procedure Depth (cm) 0.1 cm 07/28/23 1500   Post-Procedure Surface Area (cm^2) 0.15 cm^2 07/28/23 1500   Post-Procedure Volume (cm^3) 0.015 cm^3 07/28/23 1500   Wound Healing % 90 08/04/23 1500   Tunneling (cm) 0 cm 08/04/23 1500   Undermining (cm) 0 cm 08/04/23 1500   Wound Odor None 08/04/23 1500   Exposed Structures None 08/04/23 1500   Number of days: 47       Wound 08/04/23 Coccyx Inferior (Active)   Site Assessment Red;Fragile;Boggy 08/04/23 1500   Periwound Assessment Scar tissue;Blanchable erythema 08/04/23 1500   Margins Attached edges 08/04/23 1500   Drainage Amount Large 08/04/23 1500   Drainage Description Serosanguineous 08/04/23 1500   Treatments Cleansed;Provider debridement;Site care 08/04/23 1500   Wound Cleansing Hypochlorus  Acid 08/04/23 1500   Periwound Protectant Skin Protectant Wipes to Periwound;Barrier Paste 08/04/23 1500   Dressing Changed New 08/04/23 1500   Dressing Cleansing/Solutions Not Applicable 08/04/23 1500   Dressing Options Hydrofiber Silver;Other (Comments) 08/04/23 1500   Dressing Change/Treatment Frequency Every 72 hrs, and As Needed 08/04/23 1500   Wound Team Following Weekly 08/04/23 1500   Wound Length (cm) 1 cm 08/04/23 1500   Wound Width (cm) 0.5 cm 08/04/23 1500   Wound Depth (cm) 0.8 cm 08/04/23 1500   Wound Surface Area (cm^2) 0.5 cm^2 08/04/23 1500   Wound Volume (cm^3) 0.4 cm^3 08/04/23 1500   Post-Procedure Length (cm) 1.1 cm 08/04/23 1500   Post-Procedure Width (cm) 0.5 cm 08/04/23 1500   Post-Procedure Depth (cm) 0.8 cm 08/04/23 1500   Post-Procedure Surface Area (cm^2) 0.55 cm^2 08/04/23 1500   Post-Procedure Volume (cm^3) 0.44 cm^3 08/04/23 1500   Tunneling (cm) 0 cm 08/04/23 1500   Undermining (cm) 0 cm 08/04/23 1500   Wound Odor None 08/04/23 1500   Exposed Structures Other (Comments) 08/04/23 1500   Number of days: 0       PROCEDURE: Debridement of sacral / coccygeal pressure injury  -2% viscous lidocaine applied topically to wound bed for approximately 5 minutes prior to debridement  -Curette used to debride wound bed.  Excisional debridement was performed to remove devitalized tissue until healthy, bleeding tissue was visualized.   Entire surface of wound, 8.2 cm² debrided.  Tissue debrided into the muscle / fascia layer.    -Bleeding controlled with manual pressure.    -Wound care completed by wound RN, refer to flowsheet  -Patient tolerated the procedure well, without c/o pain or discomfort.      No debridement of any other wounds required today      BIOLOGIC LOG  5/20/2022-first application.  PRODUCT: EpiCord 2 x 3 cm . PRODUCT #[id number]; EXPIRES: 2026-12-01 5/27/2022- second application.  PRODUCT: EpiCordEX 2 x 3 cm . PRODUCT #[id number]; EXPIRES: 2026-09-01    6/3/2022-  third application.  PRODUCT: EpiCordEX 2 x 3 cm . PRODUCT #[id number]; EXPIRES: 2026-10-01    6/10/2022- fourth application.  PRODUCT: EpiCordEX 2 x 3 cm . PRODUCT #[id number]; EXPIRES: 2026-09-01 6/17/2022- fifth application.  PRODUCT: EpiCordEX 2 x 3 cm . PRODUCT #[id number]; EXPIRES: 2027-02-01 6/24/2022- sixth application.  PRODUCT: EpiCordEX 2 x 3 cm . PRODUCT #[id number]; EXPIRES: 2027-02-01 07/01/22: Seventh application.  Product: Epicort Dx 2.0 x 3.0 cm reorder number [id number]; product number [id number]; expires 2027-02-01 7/22/2022- eighth application.  PRODUCT: EpiCord 2 x 3 cm, reorder #[id number]. PRODUCT #[id number]; EXPIRES: 2027-02-01      Pertinent Labs and Diagnostics:    Labs:     A1c: No results found for: HBA1C     [facility] results, 7/1/2022 (under media tab)    CRP 13    ESR 31      IMAGING:     10/3/2022-CT of pelvis with contrast  IMPRESSION:     1.  Posterior soft tissue sacral wound and 1.5 x 3.7 cm abscess.   2.  Progressive destruction of the distal sacrum and proximal coccyx. Sclerosis and irregularity of the distal sacrum consistent with osteomyelitis.   3.  Old wound within the soft tissues posterior to the distal left SI joint with possible fistulous communication.    10/3/2022-CT of tib-fib with and without contrast, with reconstruction  IMPRESSION:     1.  Old fractures of the left tibia and fibula with extensive callus formation and bony bridging as well as extensive dystrophic calcifications. Similar to previous.   2.  Distal medial soft tissue wounds with ill-defined superficial in the soft tissue thickening and fluid collections suggestive of phlegmon. No organized abscess identified.   3.  No definite osteomyelitis.   4.  Arthritic changes.        VASCULAR STUDIES: No results found.    LAST  WOUND CULTURE:   Lab Results   Component Value Date/Time    CULTRSULT  06/17/2023 09:43 PM     No growth after 5 days of  incubation.  Blood culture testing and Gram stain, if indicated, are  performed at [facility], [address] [place] [address].  Positive blood cultures are  sent to [facility], [address] [place] [address], for organism identification and  susceptibility testing.        PATHOLOGY  2/17/2023-bone fragment extracted from left lower extremity wound\\  FINAL DIAGNOSIS:     A. Left leg bone fragment at base of chronic wound:          Extensively degenerated fibrocartilaginous tissue with a rim of           fibrinopurulent debris          Correlate with culture findings            Comment: While no residual intact bone is identified, these           findings are suggestive of adjacent osteomyelitis.      ASSESSMENT AND PLAN:     1. Sacral decubitus ulcer, stage IV ([facility])  Comments: Ulcer first noted in early April 2022 as small open area which quickly enlarged.  This ulcer is present distal from previous sacral ulcer which healed after surgery in January.  Patient has history of flap reconstruction x2 to this area.  CT shows progressive destruction of distal sacrum and proximal coccyx.    8/4/2023: Wound area has increased, new open area below main ulcer,  by thin skin bridge.  Thin layer of slough to wound bed  - Excisional debridement was performed in clinic, medically necessary to promote wound healing.   -Home health to change dressing 1-2 times per week in between clinic visits  -Patient does spend a lot of time up in his wheelchair, and feels this is necessary for his quality of life.  He does have an appropriate pressure-relief cushion.  He is very well versed in pressure relieving techniques  - Patient does not currently have follow up with Dr. Saini. If wound deteriorates or fails to improve can consider re-establishing with Dr. Saini for evaluation for coverage options. Patient does not want to pursue at this time and is happy with current  conservative approach even with wound worsening.  - Known OM that has already been treated. No utility of Abx unless gross soft tissue infection which does not appear to be present today.    Wound care: Collagen, Hydrofiber silver strip, hydrofiber silver, Mepilex    2. Postoperative wound dehiscence, subsequent encounter  3. Osteomyelitis of left lower extremity  Comments: On 4/26/2022 patient underwent irrigation and debridement of multiple compartments of the left lower extremity states for pressure injury, with bone excision, and complex closure of chronic wound using biologic skin substitute.  During postop visit in surgeons office on 5/11, surgical site was noted to be dehisced.  Patient was referred to wound clinic for management.    8/4/2023: Wound area has decreased, though tissue quality is still poor.  Wound tends to deteriorate withaggressive debridement  -No debridement of this wound today  -Patient to return to clinic weekly for assessment and debridement if indicated  -Home health to change dressing 1-2 times per week in between clinic visits OM.  -Suspect underlying OM, however patient does not wish to consider surgical options at this time nor does his surgeon wish to do any further surgery to this leg. 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 There is no gross soft tissue infection and repeated Abx treatment without source control is not indicated.    -This wound has waxed and waned throughout treatment.  We will monitor closely  -Patient to be mindful of offloading when supine, should assure that his leg is not rotating medially  Wound care: Hydrofiber silver, silicone adhesive foam, tubigrip    3. Deep tissue injury  4. Pressure injury of left foot, stage IV  Comments: DTI to posterior left lower leg first observed in clinic 7/29/2022.  Patient does not know how it started, had been wearing heel float boot consistently.  Evolved into stage IV pressure  injury    8/4/2023: Area of posterior lower leg wound has increased since last assessment, however wound bed clean.  Plantar foot wound remains healed  -No need for debridement of posterior leg wound today.  -Patient is well versed on offloading techniques    Wound care: Silver Hydrofiber to manage exudate and bioburden, foam cover dressing, Tubigrip D to manage edema    5. Pressure injury of left heel, stage 3 ([facility])  Comments: First noted in clinic on 10/21/2022, originally noted to be stage II    8/4/2023: Wound remains resolved, though new epithelium is still fragile.  He does present today with some mild maceration of skin to medial lateral ankle.  Some sort of dressing was placed by podiatrist earlier this week.  Skin is still intact  -We will continue to monitor skin integrity of heels each clinic visit  - Patient continue wearing heel float boots at all times  - Heel does not appear to contact any solid surfaces while in wheelchair   Wound Care: Heel Mepilex to reduce pressure and friction    6. Quadriplegia, C5-C7, incomplete ([facility])  Comments: Complicating factor.  Impaired mobility and sensation  -Patient is still spending 7-8 hours/day up in his wheelchair, though he does have appropriate offloading cushion, and knows to reposition frequently.  Wears heel float boots bilaterally at all times          Please note that this note may have been created using voice recognition software. I have worked with technical experts from [employer] to optimize the interface.  I have made every reasonable attempt to correct obvious errors, but there may be errors of grammar and possibly content that I did not discover before finalizing the note.

## 2023-08-08 ENCOUNTER — OFFICE VISIT (OUTPATIENT)
Dept: INFECTIOUS DISEASES | Facility: MEDICAL CENTER | Age: 73
End: 2023-08-08
Attending: INTERNAL MEDICINE
Payer: MEDICARE

## 2023-08-08 VITALS
WEIGHT: 222 LBS | TEMPERATURE: 98.8 F | OXYGEN SATURATION: 94 % | HEIGHT: 70 IN | DIASTOLIC BLOOD PRESSURE: 60 MMHG | SYSTOLIC BLOOD PRESSURE: 104 MMHG | RESPIRATION RATE: 16 BRPM | HEART RATE: 59 BPM | BODY MASS INDEX: 31.78 KG/M2

## 2023-08-08 DIAGNOSIS — Z87.440 HISTORY OF FREQUENT URINARY TRACT INFECTIONS: ICD-10-CM

## 2023-08-08 DIAGNOSIS — Z93.59 SUPRAPUBIC CATHETER (HCC): ICD-10-CM

## 2023-08-08 DIAGNOSIS — Z86.19 HISTORY OF INFECTION DUE TO EXTENDED SPECTRUM BETA LACTAMASE (ESBL) PRODUCING BACTERIA: ICD-10-CM

## 2023-08-08 DIAGNOSIS — Z95.818 PRESENCE OF WATCHMAN LEFT ATRIAL APPENDAGE CLOSURE DEVICE: ICD-10-CM

## 2023-08-08 DIAGNOSIS — L89.90 PRESSURE ULCERS OF SKIN OF MULTIPLE TOPOGRAPHIC SITES: ICD-10-CM

## 2023-08-08 DIAGNOSIS — I48.0 PAROXYSMAL ATRIAL FIBRILLATION (HCC): ICD-10-CM

## 2023-08-08 PROCEDURE — 99213 OFFICE O/P EST LOW 20 MIN: CPT | Performed by: INTERNAL MEDICINE

## 2023-08-08 PROCEDURE — 3074F SYST BP LT 130 MM HG: CPT | Performed by: INTERNAL MEDICINE

## 2023-08-08 PROCEDURE — 99212 OFFICE O/P EST SF 10 MIN: CPT | Performed by: INTERNAL MEDICINE

## 2023-08-08 PROCEDURE — 3078F DIAST BP <80 MM HG: CPT | Performed by: INTERNAL MEDICINE

## 2023-08-08 RX ORDER — METHENAMINE HIPPURATE 1000 MG/1
1 TABLET ORAL 2 TIMES DAILY
COMMUNITY
Start: 2023-06-27

## 2023-08-08 ASSESSMENT — FIBROSIS 4 INDEX: FIB4 SCORE: 2.97

## 2023-08-08 NOTE — PROGRESS NOTES
[facility]    Subjective:     Chief Complaint   Patient presents with    Hospital Follow-up     Referred back to see us for recurrent UTI       Patient with known C5-7 paraplegia, neurogenic bladder with suprapubic catheter, history of stage IV sacral decubitus ulcer complicated by sacral osteomyelitis with abscess, completed therapy in 2019, decubitus ulcers and chronic ankle ulcer.  He completed 6 weeks of IV antibiotics for sacral osteomyelitis in 3 of 23.  Patient also with history of ESBL infection in urine noted in 4/2023.    Patient is currently seeing wound care for his wounds and per most recent note no sign of infection.  He was referred to ID by urology due to the history of UTIs with ESBL organisms.  Patient was most recently seen by ID on 6/20/2023 after he presented the emergency department with fevers.  Blood and urine cultures at the time were negative.  He was discharged with fosfomycin.    He is also followed by urology [place] and has been started on methenamine with no ER visits since.  His current regimen is for the suprapubic catheter be changed every 3 weeks and he is aware that any culture should be obtained from a new or very recently placed catheter for them to be useful.  If he has concerning symptoms such as fevers he calls a mobile urgent care service Dr. Viera and they are able to come to the house, he can exchange the supra for catheter on the spot and obtain urine cultures as well as labs and vitals.    Today, 8/8/2023: Patient reports feeling well and has been tolerating the mesalamine without adverse effect.  Denies feeling generally ill, fevers/chills, general malaise, headache, n/v/d, abdominal pain, chest pain or shortness of breath.      ROS  As documented above in my HPI.    Past Medical History:   Diagnosis Date    A-fib (HCC)     Acute cystitis without hematuria 10/23/2021    Arrhythmia     Atrial flutter (HCC)     Blood clotting disorder (HCC)     Patient is on  "Xarelto    Bowel habit changes     Colostomy    Clot hematuria 2023    GERD (gastroesophageal reflux disease)     Hypertension     Kidney stones     Neurogenic bladder     S/P cath    Open wound 2022    sacrum with wound vac, left ankle, [facility]    Quadriplegia, C5-C7 complete (HCC)     patient reports \" incomplete quad\"    Suprapubic catheter (HCC)        Social History     Tobacco Use    Smoking status: Former     Packs/day: 1.00     Years: 10.00     Pack years: 10.00     Types: Cigarettes     Start date: 1967     Quit date: 1977     Years since quittin.6    Smokeless tobacco: Never   Vaping Use    Vaping Use: Never used   Substance Use Topics    Alcohol use: Yes     Alcohol/week: 4.2 oz     Types: 7 Standard drinks or equivalent per week     Comment: 2/day    Drug use: No       Allergies: Sulfa drugs    Pt's medication and problem list reviewed.     Objective:     PE:  /60 (BP Location: Left arm, Patient Position: Sitting, BP Cuff Size: Large adult)   Pulse (!) 59   Temp 37.1 °C (98.8 °F) (Temporal)   Resp 16   Ht 1.778 m (5' 10\")   Wt 101 kg (222 lb)   SpO2 94%   BMI 31.85 kg/m²     Vital signs reviewed    Constitutional: Appears well-developed and well-nourished. No acute distress.  Speech fluent.    Eyes: Conjunctivae normal and EOM are normal. Pupils are equal, round, and reactive to light.   ENMT: Lips without lesions, good dentition.  Oropharynx is clear and moist.  Neck: Trachea midline. Normal range of motion. Neck supple. No masses.  No JVD.  Cardiovascular: Normal rate, regular rhythm, normal heart sounds and intact distal pulses. No murmur, gallop, or friction rub. No edema.  Respiratory: No respiratory distress, unlabored respiratory effort.  Lungs clear to auscultation bilaterally. No wheezes or rales.   Abdomen: Soft, non tender, distended. BS + x 4. No masses or hepatosplenomegaly.   Musculoskeletal: Patient in wheelchair s  Skin: Warm and dry. Good " turgor. No visible rashes or lesions.  Did not examine his wounds.  Neurological: No cranial nerve deficit. Coordination normal.  Sensation intact.  Psychiatric: Alert and oriented to person, place, and time. Normal mood, calm affect.  Normal behavior and judgment.     Labs:  WBC   Date/Time Value Ref Range Status   06/19/2023 01:56 AM 5.4 4.8 - 10.8 K/uL Final     RBC   Date/Time Value Ref Range Status   06/19/2023 01:56 AM 3.42 (L) 4.70 - 6.10 M/uL Final     Hemoglobin   Date/Time Value Ref Range Status   06/19/2023 01:56 AM 11.4 (L) 14.0 - 18.0 g/dL Final     Hematocrit   Date/Time Value Ref Range Status   06/19/2023 01:56 AM 35.5 (L) 42.0 - 52.0 % Final     MCV   Date/Time Value Ref Range Status   06/19/2023 01:56 .8 (H) 81.4 - 97.8 fL Final     MCH   Date/Time Value Ref Range Status   06/19/2023 01:56 AM 33.3 (H) 27.0 - 33.0 pg Final     MCHC   Date/Time Value Ref Range Status   06/19/2023 01:56 AM 32.1 (L) 32.3 - 36.5 g/dL Final     Comment:     Please note new reference range effective 05/22/2023.     MPV   Date/Time Value Ref Range Status   06/19/2023 01:56 AM 8.9 (L) 9.0 - 12.9 fL Final        Sodium   Date/Time Value Ref Range Status   06/19/2023 01:56  135 - 145 mmol/L Final     Potassium   Date/Time Value Ref Range Status   06/19/2023 01:56 AM 4.0 3.6 - 5.5 mmol/L Final     Chloride   Date/Time Value Ref Range Status   06/19/2023 01:56  96 - 112 mmol/L Final     Co2   Date/Time Value Ref Range Status   06/19/2023 01:56 AM 21 20 - 33 mmol/L Final     Glucose   Date/Time Value Ref Range Status   06/19/2023 01:56  (H) 65 - 99 mg/dL Final     Bun   Date/Time Value Ref Range Status   06/19/2023 01:56 AM 10 8 - 22 mg/dL Final     Creatinine   Date/Time Value Ref Range Status   06/19/2023 01:56 AM 0.55 0.50 - 1.40 mg/dL Final       Alkaline Phosphatase   Date/Time Value Ref Range Status   06/17/2023 09:15 PM 78 30 - 99 U/L Final     AST(SGOT)   Date/Time Value Ref Range Status    06/17/2023 09:15 PM 10 (L) 12 - 45 U/L Final     ALT(SGPT)   Date/Time Value Ref Range Status   06/17/2023 09:15 PM <5 2 - 50 U/L Final     Total Bilirubin   Date/Time Value Ref Range Status   06/17/2023 09:15 PM 0.6 0.1 - 1.5 mg/dL Final        No results found for: CPKTOTAL     Assessment and Plan:   The following treatment plan was discussed with patient at length:    1. History of frequent urinary tract infections        2. Suprapubic catheter (HCC)        3. Pressure ulcers of skin of multiple topographic sites        4. History of infection due to extended spectrum beta lactamase (ESBL) producing bacteria        5. Paroxysmal atrial fibrillation (HCC)        6. Presence of Watchman left atrial appendage closure device- implanted 11/22/22 Dr Merino           --- Agree with current management per urology with Methenamine.  Methenamine is hydrolyzed to formaldehyde and ammonia which is nonspecifically bacteriocidal and other components maintain urine acidity and improve symptoms.  These mechanisms are not concerning for developing resistance to other antibiotics such as beta-lactam's or fluoroquinolones.  --- Continue with changing suprapubic catheter every 3 weeks as planned by urology  --- Refrain from checking urine culture unless he has specific symptoms such as fevers.  Asymptomatic bacteriuria is not an indication for treatment.  --- If urine is checked for culture please do so from newly placed suprapubic catheter (preferred) or catheter that has been placed in the last 72 hours   --- If the patient does go into the ER with concern for UTI and/or sepsis okay to base antibiotics on previously grown organisms and obtain new culture prior to starting antibiotics.  Once cultures are are obtained aggressively de-escalate or stop antibiotics if possible to prevent further resistance.  --- Maintain good hydration  --- Continue to follow-up with wound care for his pressure ulcers    Follow up:PRN, RTC sooner if  needed. FU with PCP for ongoing chronic medical conditions.     Lisa Olson M.D.       Please note that this dictation was created using voice recognition software. I have  worked with technical experts from [facility] to optimize the interface.  I have made every reasonable attempt to correct obvious errors, but there may be errors of grammar and possibly content that I did not discover before finalizing the note.

## 2023-08-09 ENCOUNTER — TELEPHONE (OUTPATIENT)
Dept: INFECTIOUS DISEASES | Facility: MEDICAL CENTER | Age: 73
End: 2023-08-09
Payer: MEDICARE

## 2023-08-11 ENCOUNTER — OFFICE VISIT (OUTPATIENT)
Dept: WOUND CARE | Facility: MEDICAL CENTER | Age: 73
End: 2023-08-11
Attending: INTERNAL MEDICINE
Payer: MEDICARE

## 2023-08-11 VITALS
DIASTOLIC BLOOD PRESSURE: 60 MMHG | TEMPERATURE: 98 F | SYSTOLIC BLOOD PRESSURE: 115 MMHG | HEART RATE: 59 BPM | RESPIRATION RATE: 18 BRPM | OXYGEN SATURATION: 94 %

## 2023-08-11 DIAGNOSIS — G82.54 QUADRIPLEGIA, C5-C7 INCOMPLETE (HCC): ICD-10-CM

## 2023-08-11 DIAGNOSIS — T14.8XXA DEEP TISSUE INJURY: ICD-10-CM

## 2023-08-11 DIAGNOSIS — T81.31XD POSTOPERATIVE WOUND DEHISCENCE, SUBSEQUENT ENCOUNTER: ICD-10-CM

## 2023-08-11 DIAGNOSIS — M86.9 OSTEOMYELITIS OF LEFT LOWER EXTREMITY (HCC): ICD-10-CM

## 2023-08-11 DIAGNOSIS — L89.894 PRESSURE INJURY OF LEFT FOOT, STAGE 4 (HCC): Primary | ICD-10-CM

## 2023-08-11 DIAGNOSIS — L89.623 PRESSURE INJURY OF LEFT HEEL, STAGE 3 (HCC): ICD-10-CM

## 2023-08-11 DIAGNOSIS — L89.154 SACRAL DECUBITUS ULCER, STAGE IV (HCC): ICD-10-CM

## 2023-08-11 PROCEDURE — 99213 OFFICE O/P EST LOW 20 MIN: CPT | Mod: 25 | Performed by: NURSE PRACTITIONER

## 2023-08-11 PROCEDURE — 3078F DIAST BP <80 MM HG: CPT | Performed by: NURSE PRACTITIONER

## 2023-08-11 PROCEDURE — 11043 DBRDMT MUSC&/FSCA 1ST 20/<: CPT | Performed by: NURSE PRACTITIONER

## 2023-08-11 PROCEDURE — 3074F SYST BP LT 130 MM HG: CPT | Performed by: NURSE PRACTITIONER

## 2023-08-11 PROCEDURE — 99213 OFFICE O/P EST LOW 20 MIN: CPT | Mod: 25

## 2023-08-11 PROCEDURE — 11042 DBRDMT SUBQ TIS 1ST 20SQCM/<: CPT

## 2023-08-11 PROCEDURE — 11043 DBRDMT MUSC&/FSCA 1ST 20/<: CPT

## 2023-08-11 PROCEDURE — 11042 DBRDMT SUBQ TIS 1ST 20SQCM/<: CPT | Mod: 59 | Performed by: NURSE PRACTITIONER

## 2023-08-11 NOTE — PROGRESS NOTES
Provider Encounter- Pressure Injury        HISTORY OF PRESENT ILLNESS  Wound History:    START OF CARE IN CLINIC: 6/23/2023 (return to clinic after hospitalization)    REFERRING PROVIDER: Sahara Mendez      WOUNDS- Pressure Injury, Sacrococcygeal, stage IV                                                             Surgical wound dehiscence, left medial lower leg-status post surgical I&D of stage IV pressure injury and           biologic application                    Pressure injury, DTI, left posterior lower leg-first observed in clinic 7/29/2022       Pressure injury stage III L heel - first noted 10/21     Right 2nd toe avulsion - first noted 10/21     Skin tag left posterior ear - first noted 10/21     HISTORY: Patient with history of incomplete quadriplegia referred to [facility] for treatment of a stage IV pressure injury.  He has a history of previous pressure injuries to this area, and underwent muscle flaps in 2019, and then again in 2020.  He was seen in the wound clinic in November 2021 for an ulcer proximal from his current ulcer, and pressure injuries to his left posterior lower leg and left heel.  At that time, it was discovered that the patient had retained VAC foam embedded in the wound bed of the sacral wound.  Attempts were made to get him back to his plastic surgeon, though unsuccessful.  In January he underwent surgical removal of VAC sponge along with excisional debridement of his sacral wound by Dr. Chaves.  After the surgery, his wound went on to heal without incident.   In early April 2022, his home health nurse noted a new sacral ulcer, below the previous ulcer which quickly tripled in size over the following weeks.  The ulcer to his left medial lower leg had also deteriorated, with bone visible at the base..  He was hospitalized from 4/22 until 4/27/2022 and underwent surgery with Dr. Raman on 4/26 for irrigation and debridement of multiple compartments of the left lower extremity, bone  excision, and complex closure of chronic wound using biologic skin substitute.   His sacrococcygeal wound was not surgically addressed during this admission.  He was discharged back to his group home, with home health, and referral to outpatient wound clinic for his sacral wound.  He was instructed to follow-up with his surgeon for his lower leg wound.       Postoperatively, the left medial lower leg incision dehisced.  He was seen by his surgeon at [facility] on 5/11.  The surgeon opted to leave remaining sutures in place, and refer him to the wound clinic for treatment of this wound.   Treatment of this wound was initiated in clinic on 5/12.  During this visit was also noted that his heel DTI had resolved, but that he had a new pressure injury to his left posterior lower extremity.     A new pressure injury was noted to patient's right upper buttock/lower back on 5/20/2022.  Wound was linear in shape, skin discolored but intact.     Abrasion noted to left anterior lower leg.  First observed in clinic on 7/22/2022.  Patient states he bumped his leg into his food tray.     Small DTI noted to patient's left lateral lower leg on 7/29/2022.  Skin intact but discolored.     Large area of deep tissue injury noted to patient's left exterior lower leg.  Patient denied any trauma to this area.  Skin intact.  Wound documented.    1/27/2023: Patient was [facility] [place] [facility] [place]1/25/2023 with gross hematuria. He underwent RICHARD which showed watchman device was in place and he was taken off of Xarelto. While hospitalized wound team was consulted. He was referred back to [facility] and home health upon discharge.    Patient was hospitalized at [facility] for pyelonephritis from 2/26 until 3/2/2023, admitted for fever and general malaise.  He was admitted and initially started on linezolid and meropenem for suspected UTI and history of multidrug-resistant organisms.  Urine cultures were negative. ID was consulted, recommended CT of chest and  abdomen,which were negative for acute findings. However, he was treated with 5 days total course of antibiotics for suspected UTI, and symptoms completely resolved.  During this admission, the inpatient wound team was consulted for treatment of his sacral and lower leg wounds.  A wound culture was taken from his left heel pressure ulcer, negative.  Once stabilized, he was discharged home and referred back to [facility] to resume treatment of his wounds.    Pertinent Medical History: Incomplete quadriplegia, history of stage IV pressure injuries, history of flap procedures to sacral pressure injuries, osteomyelitis, obesity, colostomy in place   Contributing factors: Immobility and Obesity, impaired sensation    Personal support: Attendant-staff at [facility] and home health nursing    TOBACCO USE:   Former smoker, quit in 1977.  Never used smokeless tobacco    Patient's problem list, allergies, and current medications reviewed and updated in Epic    Interval History:  Interval History thinned 7/29/2022.  Please see previous notes for complete interval history.     Interval History thinned 1/27/2023. Please see previous note for complete interval history.    Interval History thinned 3/3/2023.  Please see previous notes for complete interval history.      Interval History thinned 8/4/2023.  Please see previous notes for complete interval history.      7/28/2023: Clinic visit with Steve Hodges MD. Patient reports being in normal state of health. Denies any current issues. His lower wounds are largely unchanged. Sacrum continues to enlarge, he reports that he has been up in chair more frequently this week. Patient counseled on risks of enlarging pressure injury including risk that wound may progress, known OM may worsen or expand including causing illness. He is aware of risks and possible other treatment options, he would like to proceed with current treatment.    8/4/2023 : Clinic visit with ADILSON Arthur, FNP-BC,  IMAN, LILIYA.  Turk states he is feeling well overall.  Left medial lower leg wound has decreased in area significantly.  However, a new area has opened just below sacral ulcer,  from an ulcer by thin skin bridge.  Patient denies any changes to his usual activity.  His left heel wound remains healed, however he does have some slightly denuded skin to the heel.  He was seen by his podiatrist earlier this week, dressing was applied, possibly causing slight maceration.    8/11/2023 : Clinic visit with ADILSON Arthur, DAT-BC, IMAN, LILIYA.   Continues to feel well.  Unfortunately, left plantar foot lateral foot wound has reopened slightly.  The skin to his heel is slightly macerated, home health is using smaller heel foam dressing .  Sacrum measures about the same.  There is some friable red tissue along superior wound edge that was treated with AgNO3 today.    REVIEW OF SYSTEMS:   Unchanged from previous wound clinic assessment on 8/4/2023, except as noted in interval history above     PHYSICAL EXAMINATION:   /60   Pulse (!) 59   Temp 36.7 °C (98 °F) (Temporal)   Resp 18   SpO2 94%   Physical Exam  Constitutional:       Appearance: He is obese.   Cardiovascular:      Rate and Rhythm: Normal rate.   Pulmonary:      Effort: Pulmonary effort is normal.   Abdominal:      Comments: Colostomy left lower quadrant   Genitourinary:     Comments: Suprapubic catheter to down drain   Skin:     Comments: Stage IV coccygeal pressure injury -total wound area measures slightly smaller, thin skin bridge  inferior and superior wounds.  Friable hypertrophic tissue along superior wound edge, wound depth still probes close to bone, moderate serosanguineous drainage, thin layer of slough to wound bed, no evidence of infection.  Full-thickness wound to left medial lower leg -wound area has again decreased, tissue quality remains poor, minimal to moderate serosanguineous drainage, no evidence of  infection    Stage III pressure injury left posterior heel -remains resolved, however skin around wound site is macerated but intact    Stage IV pressure injury plantar foot -wound has reopened slightly, thin layer of slough to wound bed, moderate amount of serosanguineous drainage, periwound intact.  No evidence of infection    Stage III pressure injury to posterior left lower leg, recurring-shallow stage III-wound area continues to wax and wane, depth is shallow, minimal to moderate serosanguineous drainage, no evidence of infection       Neurological:      Mental Status: He is alert and oriented to person, place, and time.   Psychiatric:         Mood and Affect: Mood normal.         WOUND ASSESSMENT  Wound 06/18/23 Pressure Injury Coccyx Stage IV (Active)   Wound Image    08/11/23 1500   Site Assessment Red;Pink;Fragile;Undermining 08/11/23 1500   Periwound Assessment Blanchable erythema;Maceration 08/11/23 1500   Margins Unattached edges 08/11/23 1500   Drainage Amount Large 08/11/23 1500   Drainage Description Serosanguineous 08/11/23 1500   Treatments Cleansed;Provider debridement;Site care 08/11/23 1500   Wound Cleansing Foam Cleanser/Washcloth 08/11/23 1500   Periwound Protectant Skin Protectant Wipes to Periwound;Barrier Paste 08/11/23 1500   Dressing Changed Changed 08/11/23 1500   Dressing Cleansing/Solutions Not Applicable 08/11/23 1500   Dressing Options Collagen Dressing;Hydrofiber Silver 08/11/23 1500   Dressing Change/Treatment Frequency Every 72 hrs, and As Needed 08/11/23 1500   Wound Team Following Weekly 08/11/23 1500   WOUND NURSE ONLY - Pressure Injury Stage 4 08/11/23 1500   Wound Length (cm) 4.5 cm 08/11/23 1500   Wound Width (cm) 2.4 cm 08/11/23 1500   Wound Depth (cm) 0.7 cm 08/11/23 1500   Wound Surface Area (cm^2) 10.8 cm^2 08/11/23 1500   Wound Volume (cm^3) 7.56 cm^3 08/11/23 1500   Post-Procedure Length (cm) 4.5 cm 08/11/23 1500   Post-Procedure Width (cm) 2.5 cm 08/11/23 1500    Post-Procedure Depth (cm) 0.6 cm 08/11/23 1500   Post-Procedure Surface Area (cm^2) 11.25 cm^2 08/11/23 1500   Post-Procedure Volume (cm^3) 6.75 cm^3 08/11/23 1500   Wound Healing % -108 08/11/23 1500   Tunneling (cm) 0 cm 08/11/23 1500   Tunneling Clock Position of Wound 12 05/19/23 1400   Undermining (cm) 1.5 cm 08/11/23 1500   Undermining of Wound, 1st Location To 7 o'clock 08/11/23 1500   Undermining (cm) - 2nd location 5 cm 08/11/23 1500   Undermining of Wound, 2nd Location From 12 o'clock 08/11/23 1500   Wound Odor None 08/11/23 1500   Exposed Structures Other (Comments) 08/11/23 1500   Number of days: 54       Wound 06/18/23 Full Thickness Wound Leg Medial Left (Active)   Wound Image   08/11/23 1500   Site Assessment Red;Purple;Pink;Boggy 08/11/23 1500   Periwound Assessment Fragile;Flaky;Edema 08/11/23 1500   Margins Unattached edges 08/11/23 1500   Drainage Amount Moderate 08/11/23 1500   Drainage Description Serosanguineous 08/11/23 1500   Treatments Cleansed;Site care 08/11/23 1500   Wound Cleansing Foam Cleanser/Washcloth 08/11/23 1500   Periwound Protectant Skin Protectant Wipes to Periwound;Barrier Paste 08/11/23 1500   Dressing Changed Changed 08/11/23 1500   Dressing Cleansing/Solutions Not Applicable 08/11/23 1500   Dressing Options Hydrofiber Silver 08/11/23 1500   Dressing Change/Treatment Frequency Every 72 hrs, and As Needed 08/11/23 1500   Wound Team Following Weekly 08/11/23 1500   Non-staged Wound Description Full thickness 08/11/23 1500   Wound Length (cm) 2.1 cm 08/11/23 1500   Wound Width (cm) 2.5 cm 08/11/23 1500   Wound Depth (cm) 0.3 cm 08/11/23 1500   Wound Surface Area (cm^2) 5.25 cm^2 08/11/23 1500   Wound Volume (cm^3) 1.575 cm^3 08/11/23 1500   Post-Procedure Length (cm) 4 cm 07/28/23 1500   Post-Procedure Width (cm) 3 cm 07/28/23 1500   Post-Procedure Depth (cm) 0.3 cm 07/28/23 1500   Post-Procedure Surface Area (cm^2) 12 cm^2 07/28/23 1500   Post-Procedure Volume (cm^3) 3.6  cm^3 07/28/23 1500   Wound Healing % -425 08/11/23 1500   Tunneling (cm) 0 cm 08/11/23 1500   Undermining (cm) 0 cm 08/11/23 1500   Undermining of Wound, 1st Location From 1 o'clock;To 2 o'clock 06/02/23 1600   Undermining (cm) - 2nd location 1.3 cm 06/02/23 1600   Undermining of Wound, 2nd Location From 3 o'clock;To 4 o'clock 06/02/23 1600   Wound Odor None 08/11/23 1500   Exposed Structures None 08/11/23 1500   Number of days: 54       Wound 06/18/23 Full Thickness Wound Leg Posterior Left (Active)   Wound Image   08/11/23 1500   Site Assessment Pink;Red;Purple 08/11/23 1500   Periwound Assessment Blanchable erythema 08/11/23 1500   Margins Attached edges 08/11/23 1500   Closure Secondary intention 07/28/23 1500   Drainage Amount Moderate 08/11/23 1500   Drainage Description Serosanguineous 08/11/23 1500   Treatments Cleansed;Site care 08/11/23 1500   Wound Cleansing Foam Cleanser/Washcloth 08/11/23 1500   Periwound Protectant Skin Protectant Wipes to Periwound;Barrier Paste 08/11/23 1500   Dressing Changed Changed 08/11/23 1500   Dressing Cleansing/Solutions Not Applicable 08/11/23 1500   Dressing Options Hydrofiber Silver;Silicone Adhesive Foam 08/11/23 1500   Dressing Change/Treatment Frequency Every 72 hrs, and As Needed 08/11/23 1500   Wound Team Following Weekly 08/11/23 1500   Non-staged Wound Description Full thickness 08/11/23 1500   Wound Length (cm) 0.7 cm 08/11/23 1500   Wound Width (cm) 0.2 cm 08/11/23 1500   Wound Depth (cm) 0.1 cm 08/11/23 1500   Wound Surface Area (cm^2) 0.14 cm^2 08/11/23 1500   Wound Volume (cm^3) 0.014 cm^3 08/11/23 1500   Post-Procedure Length (cm) 0.5 cm 07/28/23 1500   Post-Procedure Width (cm) 0.3 cm 07/28/23 1500   Post-Procedure Depth (cm) 0.1 cm 07/28/23 1500   Post-Procedure Surface Area (cm^2) 0.15 cm^2 07/28/23 1500   Post-Procedure Volume (cm^3) 0.015 cm^3 07/28/23 1500   Wound Healing % 99 08/11/23 1500   Tunneling (cm) 0 cm 08/11/23 1500   Undermining (cm) 0 cm  08/11/23 1500   Wound Odor None 08/11/23 1500   Exposed Structures None 08/11/23 1500   Number of days: 54       Wound 08/04/23 Coccyx Inferior (Active)   Wound Image    08/11/23 1500   Site Assessment Red;Early/partial granulation 08/11/23 1500   Periwound Assessment Scar tissue;Blanchable erythema 08/11/23 1500   Margins Attached edges 08/04/23 1500   Drainage Amount Moderate 08/11/23 1500   Drainage Description Serosanguineous 08/11/23 1500   Treatments Cleansed;Provider debridement;Site care 08/11/23 1500   Wound Cleansing Foam Cleanser/Washcloth 08/11/23 1500   Periwound Protectant Skin Protectant Wipes to Periwound;Barrier Paste 08/11/23 1500   Dressing Changed Changed 08/11/23 1500   Dressing Cleansing/Solutions Not Applicable 08/11/23 1500   Dressing Options Collagen Dressing;Hydrofiber Silver 08/11/23 1500   Dressing Change/Treatment Frequency Every 72 hrs, and As Needed 08/11/23 1500   Wound Team Following Weekly 08/11/23 1500   Wound Length (cm) 1.2 cm 08/11/23 1500   Wound Width (cm) 0.5 cm 08/11/23 1500   Wound Depth (cm) 0.6 cm 08/11/23 1500   Wound Surface Area (cm^2) 0.6 cm^2 08/11/23 1500   Wound Volume (cm^3) 0.36 cm^3 08/11/23 1500   Post-Procedure Length (cm) 1.4 cm 08/11/23 1500   Post-Procedure Width (cm) 0.9 cm 08/11/23 1500   Post-Procedure Depth (cm) 0.6 cm 08/11/23 1500   Post-Procedure Surface Area (cm^2) 1.26 cm^2 08/11/23 1500   Post-Procedure Volume (cm^3) 0.756 cm^3 08/11/23 1500   Wound Healing % 10 08/11/23 1500   Tunneling (cm) 0 cm 08/11/23 1500   Undermining (cm) 0 cm 08/11/23 1500   Wound Odor None 08/11/23 1500   Exposed Structures Other (Comments) 08/04/23 1500   Number of days: 7       Wound 08/11/23 Full Thickness Wound Foot Lateral Left (Active)   Wound Image    08/11/23 1500   Site Assessment Red;[place] 08/11/23 1500   Periwound Assessment Flaky;Callused 08/11/23 1500   Margins Attached edges 08/11/23 1500   Drainage Amount Small 08/11/23 1500   Drainage Description  Sanguineous 08/11/23 1500   Treatments Cleansed;Provider debridement;Site care 08/11/23 1500   Wound Cleansing Foam Cleanser/Washcloth 08/11/23 1500   Periwound Protectant Skin Protectant Wipes to Periwound;Barrier Paste 08/11/23 1500   Dressing Status Clean;Intact 08/11/23 1500   Dressing Changed New 08/11/23 1500   Dressing Cleansing/Solutions Not Applicable 08/11/23 1500   Dressing Options Hydrofiber Silver;Silicone Adhesive Foam 08/11/23 1500   Dressing Change/Treatment Frequency Weekly, and As Needed 08/11/23 1500   Wound Team Following Weekly 08/11/23 1500   Non-staged Wound Description Full thickness 08/11/23 1500   Wound Length (cm) 0.2 cm 08/11/23 1500   Wound Width (cm) 0.6 cm 08/11/23 1500   Wound Depth (cm) 0.2 cm 08/11/23 1500   Wound Surface Area (cm^2) 0.12 cm^2 08/11/23 1500   Wound Volume (cm^3) 0.024 cm^3 08/11/23 1500   Post-Procedure Length (cm) 0.4 cm 08/11/23 1500   Post-Procedure Width (cm) 0.5 cm 08/11/23 1500   Post-Procedure Depth (cm) 0.2 cm 08/11/23 1500   Post-Procedure Surface Area (cm^2) 0.2 cm^2 08/11/23 1500   Post-Procedure Volume (cm^3) 0.04 cm^3 08/11/23 1500   Tunneling (cm) 0 cm 08/11/23 1500   Undermining (cm) 0 cm 08/11/23 1500   Wound Odor None 08/11/23 1500   Number of days: 0       PROCEDURE: Debridement of sacral / coccygeal pressure injury-now presents as to wound  -2% viscous lidocaine applied topically to wound beds for approximately 5 minutes prior to debridement  -Curette used to debride wound bed.  Excisional debridement was performed to remove devitalized tissue until healthy, bleeding tissue was visualized.   Entire surface of wound, 12.51 cm² debrided.  Tissue debrided into the muscle / fascia layer.    -Bleeding controlled with manual pressure.    -Wound care completed by wound RN, refer to flowsheet  -Patient tolerated the procedure well, without c/o pain or discomfort.      PROCEDURE: Excisional debridement of left plantar/lateral foot ulcer  -2% viscous  lidocaine applied topically to wound bed for approximately 5 minutes prior to debridement  -Curette used to debride wound bed.  Excisional debridement was performed to remove devitalized tissue until healthy, bleeding tissue was visualized.   Entire surface of wound, 0.2 cm² debrided.  Tissue debrided into the subcutaneous layer.    -Bleeding controlled with manual pressure.    -Wound care completed by wound RN, refer to flowsheet  -Patient tolerated the procedure well, without c/o pain or discomfort.      No debridement of any other wounds required today      BIOLOGIC LOG  5/20/2022-first application.  PRODUCT: EpiCord 2 x 3 cm . PRODUCT #[id number]; EXPIRES: 2026-12-01 5/27/2022- second application.  PRODUCT: EpiCordEX 2 x 3 cm . PRODUCT #[id number]; EXPIRES: 2026-09-01    6/3/2022- third application.  PRODUCT: EpiCordEX 2 x 3 cm . PRODUCT #[id number]; EXPIRES: 2026-10-01    6/10/2022- fourth application.  PRODUCT: EpiCordEX 2 x 3 cm . PRODUCT #[id number]; EXPIRES: 2026-09-01 6/17/2022- fifth application.  PRODUCT: EpiCordEX 2 x 3 cm . PRODUCT #[id number]; EXPIRES: 2027-02-01 6/24/2022- sixth application.  PRODUCT: EpiCordEX 2 x 3 cm . PRODUCT #[id number]; EXPIRES: 2027-02-01 07/01/22: Seventh application.  Product: Epicort Dx 2.0 x 3.0 cm reorder number [id number]; product number [id number]; expires 2027-02-01 7/22/2022- eighth application.  PRODUCT: EpiCord 2 x 3 cm, reorder #[id number]. PRODUCT #[id number]; EXPIRES: 2027-02-01      Pertinent Labs and Diagnostics:    Labs:     A1c: No results found for: HBA1C     [facility] results, 7/1/2022 (under media tab)    CRP 13    ESR 31      IMAGING:     10/3/2022-CT of pelvis with contrast  IMPRESSION:     1.  Posterior soft tissue sacral wound and 1.5 x 3.7 cm abscess.   2.  Progressive destruction of the distal sacrum and proximal coccyx. Sclerosis and irregularity of the distal sacrum consistent with  osteomyelitis.   3.  Old wound within the soft tissues posterior to the distal left SI joint with possible fistulous communication.    10/3/2022-CT of tib-fib with and without contrast, with reconstruction  IMPRESSION:     1.  Old fractures of the left tibia and fibula with extensive callus formation and bony bridging as well as extensive dystrophic calcifications. Similar to previous.   2.  Distal medial soft tissue wounds with ill-defined superficial in the soft tissue thickening and fluid collections suggestive of phlegmon. No organized abscess identified.   3.  No definite osteomyelitis.   4.  Arthritic changes.        VASCULAR STUDIES: No results found.    LAST  WOUND CULTURE:   Lab Results   Component Value Date/Time    CULTRSULT  06/17/2023 09:43 PM     No growth after 5 days of incubation.  Blood culture testing and Gram stain, if indicated, are  performed at [facility], [address] [place] [address].  Positive blood cultures are  sent to [facility], [address] [place] [address], for organism identification and  susceptibility testing.        PATHOLOGY  2/17/2023-bone fragment extracted from left lower extremity wound\\  FINAL DIAGNOSIS:     A. Left leg bone fragment at base of chronic wound:          Extensively degenerated fibrocartilaginous tissue with a rim of           fibrinopurulent debris          Correlate with culture findings            Comment: While no residual intact bone is identified, these           findings are suggestive of adjacent osteomyelitis.      ASSESSMENT AND PLAN:     1. Sacral decubitus ulcer, stage IV ([facility])  Comments: Ulcer first noted in early April 2022 as small open area which quickly enlarged.  This ulcer is present distal from previous sacral ulcer which healed after surgery in January.  Patient has history of flap reconstruction x2 to this area.  CT shows progressive destruction of distal sacrum and proximal  coccyx.    8/11/2023: Wound area has again increased, depth about the same.  Presents now as 2 separate wounds  by thin skin bridge as noted last week.  - Excisional debridement was performed in clinic, medically necessary to promote wound healing.   -Home health to change dressing 1-2 times per week in between clinic visits  -Patient does spend a lot of time up in his wheelchair, and feels this is necessary for his quality of life.  He does have an appropriate pressure-relief cushion.  He is very well versed in pressure relieving techniques  - Patient does not currently have follow up with Dr. Saini. If wound deteriorates or fails to improve can consider re-establishing with Dr. Saini for evaluation for coverage options. Patient does not want to pursue at this time and is happy with current conservative approach even with wound worsening.  - Known OM that has already been treated. No utility of Abx unless gross soft tissue infection which does not appear to be present today.    Wound care: Collagen, Hydrofiber silver strip, hydrofiber silver, Mepilex    2. Postoperative wound dehiscence, subsequent encounter  3. Osteomyelitis of left lower extremity  Comments: On 4/26/2022 patient underwent irrigation and debridement of multiple compartments of the left lower extremity states for pressure injury, with bone excision, and complex closure of chronic wound using biologic skin substitute.  During postop visit in surgeons office on 5/11, surgical site was noted to be dehisced.  Patient was referred to wound clinic for management.    8/11/2023: Wound area has again decreased, though tissue quality remains poor.  -No excisional debridement of this wound today  -Patient to return to clinic weekly for assessment and debridement if indicated  -Home health to change dressing 1-2 times per week in between clinic visits OM.  -Suspect underlying OM, however patient does not wish to consider surgical options at this  time nor does his surgeon wish to do any further surgery to this leg. 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 There is no gross soft tissue infection and repeated Abx treatment without source control is not indicated.    -This wound has waxed and waned throughout treatment.  We will monitor closely  -Patient to be mindful of offloading when supine, should assure that his leg is not rotating medially  Wound care: Hydrofiber silver, silicone adhesive foam, tubigrip    3. Deep tissue injury  4. Pressure injury of left foot, stage IV  Comments: DTI to posterior left lower leg first observed in clinic 7/29/2022.  Patient does not know how it started, had been wearing heel float boot consistently.  Evolved into stage IV pressure injury    8/11/2023: Area of posterior lower leg wound about the same, wound bed clean.  Pressure injury to left plantar foot has reopened  -No need for debridement of posterior leg wound today.  -Patient is well versed on offloading techniques  -Excisional debridement of left plantar foot wound, medically necessary to promote wound healing  -Patient understands he is to offload this area,    Wound care: Both wounds-Silver Hydrofiber to manage exudate and bioburden, foam cover dressing, Tubigrip D to manage edema    5. Pressure injury of left heel, stage 3 ([facility])  Comments: First noted in clinic on 10/21/2022, originally noted to be stage II    8/11/2023: Ulcer remains healed, however periwound is again macerated.  Home health is using a smaller heel foam, less coverage  -Allevyn heel dressing sent with patient  -We will continue to monitor skin integrity of heels each clinic visit  - Patient continue wearing heel float boots at all times  - Heel does not appear to contact any solid surfaces while in wheelchair   Wound Care: Heel Mepilex to reduce pressure and friction    6. Quadriplegia, C5-C7, incomplete ([facility])  Comments: Complicating factor.  Impaired mobility and  sensation  -Patient is still spending 7-8 hours/day up in his wheelchair, though he does have appropriate offloading cushion, and knows to reposition frequently.  Wears heel float boots bilaterally at all times      My total time spent caring for the patient on the day of the encounter was 20 minutes.   This does not include time spent on separately billable procedures/tests.     Please note that this note may have been created using voice recognition software. I have worked with technical experts from [facility] to optimize the interface.  I have made every reasonable attempt to correct obvious errors, but there may be errors of grammar and possibly content that I did not discover before finalizing the note.

## 2023-08-11 NOTE — PATIENT INSTRUCTIONS
-Keep your wound dressing clean, dry, and intact.    -Change your dressing if it becomes soiled, soaked, or falls off.    -Should you experience any significant changes in your wound(s), such as infection (redness, swelling, localized heat, increased pain, fever > 101 F, chills) or have any questions regarding your home care instructions, please contact the wound center at [phone]. If after hours, contact your primary care physician or go to the hospital emergency room.

## 2023-08-18 ENCOUNTER — OFFICE VISIT (OUTPATIENT)
Dept: WOUND CARE | Facility: MEDICAL CENTER | Age: 73
End: 2023-08-18
Attending: INTERNAL MEDICINE
Payer: MEDICARE

## 2023-08-18 VITALS
RESPIRATION RATE: 18 BRPM | OXYGEN SATURATION: 96 % | TEMPERATURE: 97.7 F | HEART RATE: 52 BPM | SYSTOLIC BLOOD PRESSURE: 126 MMHG | DIASTOLIC BLOOD PRESSURE: 63 MMHG

## 2023-08-18 DIAGNOSIS — T81.31XD POSTOPERATIVE WOUND DEHISCENCE, SUBSEQUENT ENCOUNTER: ICD-10-CM

## 2023-08-18 DIAGNOSIS — T14.8XXA DEEP TISSUE INJURY: ICD-10-CM

## 2023-08-18 DIAGNOSIS — L89.154 SACRAL DECUBITUS ULCER, STAGE IV (HCC): ICD-10-CM

## 2023-08-18 DIAGNOSIS — L89.623 PRESSURE INJURY OF LEFT HEEL, STAGE 3 (HCC): ICD-10-CM

## 2023-08-18 DIAGNOSIS — G82.54 QUADRIPLEGIA, C5-C7 INCOMPLETE (HCC): ICD-10-CM

## 2023-08-18 DIAGNOSIS — M86.9 OSTEOMYELITIS OF LEFT LOWER EXTREMITY (HCC): ICD-10-CM

## 2023-08-18 DIAGNOSIS — L89.894 PRESSURE INJURY OF LEFT FOOT, STAGE 4 (HCC): ICD-10-CM

## 2023-08-18 PROCEDURE — 11042 DBRDMT SUBQ TIS 1ST 20SQCM/<: CPT

## 2023-08-18 PROCEDURE — 11043 DBRDMT MUSC&/FSCA 1ST 20/<: CPT | Performed by: NURSE PRACTITIONER

## 2023-08-18 PROCEDURE — 11042 DBRDMT SUBQ TIS 1ST 20SQCM/<: CPT | Mod: 59 | Performed by: NURSE PRACTITIONER

## 2023-08-18 PROCEDURE — 3078F DIAST BP <80 MM HG: CPT | Performed by: NURSE PRACTITIONER

## 2023-08-18 PROCEDURE — 11043 DBRDMT MUSC&/FSCA 1ST 20/<: CPT

## 2023-08-18 PROCEDURE — 3074F SYST BP LT 130 MM HG: CPT | Performed by: NURSE PRACTITIONER

## 2023-08-18 NOTE — PATIENT INSTRUCTIONS
-Keep your wound dressing clean, dry, and intact.    -Change your dressing if it becomes soiled, soaked, or falls off.    -Should you experience any significant changes in your wound(s), such as infection (redness, swelling, localized heat, increased pain, fever > 101 F, chills) or have any questions regarding your home care instructions, please contact the wound center at [phone]. If after hours, contact your primary care physician or go to the hospital emergency room.

## 2023-08-18 NOTE — PROGRESS NOTES
Provider Encounter- Pressure Injury        HISTORY OF PRESENT ILLNESS  Wound History:    START OF CARE IN CLINIC: 6/23/2023 (return to clinic after hospitalization)    REFERRING PROVIDER: Sahara Mendez      WOUNDS- Pressure Injury, Sacrococcygeal, stage IV                                                             Surgical wound dehiscence, left medial lower leg-status post surgical I&D of stage IV pressure injury and           biologic application                    Pressure injury, DTI, left posterior lower leg-first observed in clinic 7/29/2022       Pressure injury stage III L heel - first noted 10/21     Right 2nd toe avulsion - first noted 10/21     Skin tag left posterior ear - first noted 10/21     HISTORY: Patient with history of incomplete quadriplegia referred to Health system for treatment of a stage IV pressure injury.  He has a history of previous pressure injuries to this area, and underwent muscle flaps in 2019, and then again in 2020.  He was seen in the wound clinic in November 2021 for an ulcer proximal from his current ulcer, and pressure injuries to his left posterior lower leg and left heel.  At that time, it was discovered that the patient had retained VAC foam embedded in the wound bed of the sacral wound.  Attempts were made to get him back to his plastic surgeon, though unsuccessful.  In January he underwent surgical removal of VAC sponge along with excisional debridement of his sacral wound by Dr. Chaves.  After the surgery, his wound went on to heal without incident.   In early April 2022, his home health nurse noted a new sacral ulcer, below the previous ulcer which quickly tripled in size over the following weeks.  The ulcer to his left medial lower leg had also deteriorated, with bone visible at the base..  He was hospitalized from 4/22 until 4/27/2022 and underwent surgery with Dr. Raman on 4/26 for irrigation and debridement of multiple compartments of the left lower extremity, bone  excision, and complex closure of chronic wound using biologic skin substitute.   His sacrococcygeal wound was not surgically addressed during this admission.  He was discharged back to his group home, with home health, and referral to outpatient wound clinic for his sacral wound.  He was instructed to follow-up with his surgeon for his lower leg wound.       Postoperatively, the left medial lower leg incision dehisced.  He was seen by his surgeon at [facility] on 5/11.  The surgeon opted to leave remaining sutures in place, and refer him to the wound clinic for treatment of this wound.   Treatment of this wound was initiated in clinic on 5/12.  During this visit was also noted that his heel DTI had resolved, but that he had a new pressure injury to his left posterior lower extremity.     A new pressure injury was noted to patient's right upper buttock/lower back on 5/20/2022.  Wound was linear in shape, skin discolored but intact.     Abrasion noted to left anterior lower leg.  First observed in clinic on 7/22/2022.  Patient states he bumped his leg into his food tray.     Small DTI noted to patient's left lateral lower leg on 7/29/2022.  Skin intact but discolored.     Large area of deep tissue injury noted to patient's left exterior lower leg.  Patient denied any trauma to this area.  Skin intact.  Wound documented.    1/27/2023: Patient was admitted to [facility] from 1/23-1/25/2023 with gross hematuria. He underwent RICHARD which showed watchman device was in place and he was taken off of Xarelto. While hospitalized wound team was consulted. He was referred back to [facility] and home health upon discharge.    Patient was hospitalized at [facility] for pyelonephritis from 2/26 until 3/2/2023, admitted for fever and general malaise.  He was admitted and initially started on linezolid and meropenem for suspected UTI and history of multidrug-resistant organisms.  Urine cultures were negative. ID was consulted, recommended CT of chest and  abdomen,which were negative for acute findings. However, he was treated with 5 days total course of antibiotics for suspected UTI, and symptoms completely resolved.  During this admission, the inpatient wound team was consulted for treatment of his sacral and lower leg wounds.  A wound culture was taken from his left heel pressure ulcer, negative.  Once stabilized, he was discharged home and referred back to [facility] to resume treatment of his wounds.    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  Never used smokeless tobacco    Patient's problem list, allergies, and current medications reviewed and updated in Epic    Interval History:  Interval History thinned 7/29/2022.  Please see previous notes for complete interval history.     Interval History thinned 1/27/2023. Please see previous note for complete interval history.    Interval History thinned 3/3/2023.  Please see previous notes for complete interval history.      Interval History thinned 8/4/2023.  Please see previous notes for complete interval history.      7/28/2023: Clinic visit with Steve Hodges MD. Patient reports being in normal state of health. Denies any current issues. His lower wounds are largely unchanged. Sacrum continues to enlarge, he reports that he has been up in chair more frequently this week. Patient counseled on risks of enlarging pressure injury including risk that wound may progress, known OM may worsen or expand including causing illness. He is aware of risks and possible other treatment options, he would like to proceed with current treatment.    8/4/2023 : Clinic visit with ADILSON Arthur, FNP-BC,  LILIYA VALERIO.  Anjum states he is feeling well overall.  Left medial lower leg wound has decreased in area significantly.  However, a new area has opened just below sacral ulcer,  from an ulcer by thin skin bridge.  Patient denies any changes to his usual activity.  His left heel wound remains healed, however he does have some slightly denuded skin to the heel.  He was seen by his podiatrist earlier this week, dressing was applied, possibly causing slight maceration.    8/11/2023 : Clinic visit with ADILSON Arthur FNP-BC, LILIYA VALERIO.   Anjum continues to feel well.  Unfortunately, left plantar foot lateral foot wound has reopened slightly.  The skin to his heel is slightly macerated, home health is using smaller heel foam dressing .  Sacrum measures about the same.  There is some friable red tissue along superior wound edge that was treated with AgNO3 today.    8/18/2023 : Clinic visit with ADILSON Arthur FNP-BC, LILIYA VALERIO.   Anjum states he is feeling well.  The plantar foot wound has deteriorated somewhat, area is increased.  Sacral wound is about the same.  Left lower leg wounds are both smaller, though with friable tissue.   Anjum's wounds have been essentially stable for a long time.  We discussed decreasing frequency of clinic visits to every 2 weeks.  Patient is agreeable to this plan.  He understands that we can resume weekly appointments if his wounds deteriorate.    REVIEW OF SYSTEMS:   Unchanged from previous wound clinic assessment on 8/11/2023, except as noted in interval history above     PHYSICAL EXAMINATION:   /63   Pulse (!) 52   Temp 36.5 °C (97.7 °F) (Temporal)   Resp 18   SpO2 96%   Physical Exam  Constitutional:       Appearance: He is obese.   Cardiovascular:      Rate and Rhythm: Normal rate.   Pulmonary:      Effort: Pulmonary effort is normal.   Abdominal:      Comments: Colostomy left lower quadrant   Genitourinary:     Comments: Suprapubic catheter to down drain    Skin:     Comments: Stage IV coccygeal pressure injury -wound area about the same, 2 separate wounds  by a very thin skin bridge, moderate serosanguineous drainage, still probes close to bone  Full-thickness wound to left medial lower leg -wound area decreased, open wound with poor tissue quality.  Minimal to moderate serosanguineous drainage, no evidence of infection    Stage III pressure injury left posterior heel -remains resolved, periwound maceration and weeping appear to be resolved    Stage IV pressure injury plantar foot -wound area has increased, moderate serosanguineous drainage, no evidence of infection    Stage III pressure injury to posterior left lower leg, recurring-shallow stage III-area has decreased, however continues to wax and wane, minimal to moderate serosanguineous drainage, no evidence of infection       Neurological:      Mental Status: He is alert and oriented to person, place, and time.   Psychiatric:         Mood and Affect: Mood normal.         WOUND ASSESSMENT  Wound 06/18/23 Pressure Injury Coccyx Stage IV (Active)   Wound Image    08/18/23 1500   Site Assessment Red;Fragile 08/18/23 1500   Periwound Assessment Intact;Other (Comment) 08/18/23 1500   Margins Unattached edges 08/18/23 1500   Drainage Amount Large 08/18/23 1500   Drainage Description Serosanguineous 08/18/23 1500   Treatments Cleansed;Provider debridement 08/18/23 1500   Wound Cleansing Normal Saline Irrigation 08/18/23 1500   Periwound Protectant Skin Protectant Wipes to Periwound;Barrier Paste 08/18/23 1500   Dressing Changed Changed 08/18/23 1500   Dressing Cleansing/Solutions Not Applicable 08/18/23 1500   Dressing Options Collagen Dressing;Hydrofiber Silver;Other (Comments) 08/18/23 1500   Dressing Change/Treatment Frequency Every 72 hrs, and As Needed 08/18/23 1500   Wound Team Following Bi-Weekly 08/18/23 1500   WOUND NURSE ONLY - Pressure Injury Stage 4 08/18/23 1500   Wound Length (cm) 4.5 cm 08/18/23  1500   Wound Width (cm) 2.5 cm 08/18/23 1500   Wound Depth (cm) 0.6 cm 08/18/23 1500   Wound Surface Area (cm^2) 11.25 cm^2 08/18/23 1500   Wound Volume (cm^3) 6.75 cm^3 08/18/23 1500   Post-Procedure Length (cm) 4.6 cm 08/18/23 1500   Post-Procedure Width (cm) 2.5 cm 08/18/23 1500   Post-Procedure Depth (cm) 0.6 cm 08/18/23 1500   Post-Procedure Surface Area (cm^2) 11.5 cm^2 08/18/23 1500   Post-Procedure Volume (cm^3) 6.9 cm^3 08/18/23 1500   Wound Healing % -85 08/18/23 1500   Tunneling (cm) 0 cm 08/18/23 1500   Tunneling Clock Position of Wound 12 05/19/23 1400   Undermining (cm) 1.2 cm 08/18/23 1500   Undermining of Wound, 1st Location From 6 o'clock;To 8 o'clock 08/18/23 1500   Undermining (cm) - 2nd location 3.3 cm 08/18/23 1500   Undermining of Wound, 2nd Location From 12 o'clock;To 2 o'clock 08/18/23 1500   Wound Odor None 08/18/23 1500   Exposed Structures Other (Comments) 08/18/23 1500   Number of days: 61       Wound 06/18/23 Full Thickness Wound Leg Medial Left (Active)   Wound Image   08/18/23 1500   Site Assessment Red;Other (Comment) 08/18/23 1500   Periwound Assessment Edema;Blanchable erythema;Dry 08/18/23 1500   Margins Unattached edges 08/18/23 1500   Drainage Amount Moderate 08/18/23 1500   Drainage Description Serosanguineous 08/18/23 1500   Treatments Cleansed 08/18/23 1500   Wound Cleansing Normal Saline Irrigation 08/18/23 1500   Periwound Protectant Skin Protectant Wipes to Periwound;Barrier Paste 08/18/23 1500   Dressing Changed Changed 08/18/23 1500   Dressing Cleansing/Solutions Not Applicable 08/18/23 1500   Dressing Options Hydrofiber Silver;Other (Comments);Tubigrip 08/18/23 1500   Dressing Change/Treatment Frequency Every 72 hrs, and As Needed 08/18/23 1500   Wound Team Following Bi-Weekly 08/18/23 1500   Non-staged Wound Description Full thickness 08/18/23 1500   Wound Length (cm) 0.7 cm 08/18/23 1500   Wound Width (cm) 2.5 cm 08/18/23 1500   Wound Depth (cm) 0.3 cm 08/18/23 1500    Wound Surface Area (cm^2) 1.75 cm^2 08/18/23 1500   Wound Volume (cm^3) 0.525 cm^3 08/18/23 1500   Post-Procedure Length (cm) 4 cm 07/28/23 1500   Post-Procedure Width (cm) 3 cm 07/28/23 1500   Post-Procedure Depth (cm) 0.3 cm 07/28/23 1500   Post-Procedure Surface Area (cm^2) 12 cm^2 07/28/23 1500   Post-Procedure Volume (cm^3) 3.6 cm^3 07/28/23 1500   Wound Healing % -75 08/18/23 1500   Tunneling (cm) 0 cm 08/18/23 1500   Undermining (cm) 0 cm 08/18/23 1500   Undermining of Wound, 1st Location From 1 o'clock;To 2 o'clock 06/02/23 1600   Undermining (cm) - 2nd location 1.3 cm 06/02/23 1600   Undermining of Wound, 2nd Location From 3 o'clock;To 4 o'clock 06/02/23 1600   Wound Odor None 08/18/23 1500   Exposed Structures None 08/18/23 1500   Number of days: 61       Wound 06/18/23 Full Thickness Wound Leg Posterior Left (Active)   Wound Image   08/18/23 1500   Site Assessment Red 08/18/23 1500   Periwound Assessment Blanchable erythema;Edema 08/18/23 1500   Margins Attached edges 08/18/23 1500   Closure Secondary intention 07/28/23 1500   Drainage Amount Moderate 08/18/23 1500   Drainage Description Serosanguineous 08/18/23 1500   Treatments Cleansed 08/18/23 1500   Wound Cleansing Normal Saline Irrigation 08/18/23 1500   Periwound Protectant Skin Protectant Wipes to Periwound;Barrier Paste 08/18/23 1500   Dressing Changed Changed 08/18/23 1500   Dressing Cleansing/Solutions Not Applicable 08/18/23 1500   Dressing Options Hydrofiber Silver;Other (Comments);Tubigrip 08/18/23 1500   Dressing Change/Treatment Frequency Every 72 hrs, and As Needed 08/18/23 1500   Wound Team Following Bi-Weekly 08/18/23 1500   Non-staged Wound Description Full thickness 08/18/23 1500   Wound Length (cm) 0.3 cm 08/18/23 1500   Wound Width (cm) 0.2 cm 08/18/23 1500   Wound Depth (cm) 0.1 cm 08/18/23 1500   Wound Surface Area (cm^2) 0.06 cm^2 08/18/23 1500   Wound Volume (cm^3) 0.006 cm^3 08/18/23 1500   Post-Procedure Length (cm) 0.5 cm  07/28/23 1500   Post-Procedure Width (cm) 0.3 cm 07/28/23 1500   Post-Procedure Depth (cm) 0.1 cm 07/28/23 1500   Post-Procedure Surface Area (cm^2) 0.15 cm^2 07/28/23 1500   Post-Procedure Volume (cm^3) 0.015 cm^3 07/28/23 1500   Wound Healing % 99 08/18/23 1500   Tunneling (cm) 0 cm 08/18/23 1500   Undermining (cm) 0 cm 08/18/23 1500   Wound Odor None 08/18/23 1500   Exposed Structures None 08/18/23 1500   Number of days: 61       Wound 08/04/23 Coccyx Inferior (Active)   Wound Image    08/18/23 1500   Site Assessment Red 08/18/23 1500   Periwound Assessment Scar tissue;Blanchable erythema 08/18/23 1500   Margins Attached edges 08/04/23 1500   Drainage Amount Moderate 08/18/23 1500   Drainage Description Serosanguineous;Sanguineous 08/18/23 1500   Treatments Cleansed;Provider debridement 08/18/23 1500   Wound Cleansing Normal Saline Irrigation 08/18/23 1500   Periwound Protectant Skin Protectant Wipes to Periwound;Barrier Paste 08/18/23 1500   Dressing Changed Changed 08/18/23 1500   Dressing Cleansing/Solutions Not Applicable 08/18/23 1500   Dressing Options Collagen Dressing;Hydrofiber Silver;Other (Comments) 08/18/23 1500   Dressing Change/Treatment Frequency Every 72 hrs, and As Needed 08/18/23 1500   Wound Team Following Bi-Weekly 08/18/23 1500   Wound Length (cm) 0.7 cm 08/18/23 1500   Wound Width (cm) 0.5 cm 08/18/23 1500   Wound Depth (cm) 0.4 cm 08/18/23 1500   Wound Surface Area (cm^2) 0.35 cm^2 08/18/23 1500   Wound Volume (cm^3) 0.14 cm^3 08/18/23 1500   Post-Procedure Length (cm) 0.8 cm 08/18/23 1500   Post-Procedure Width (cm) 0.5 cm 08/18/23 1500   Post-Procedure Depth (cm) 0.4 cm 08/18/23 1500   Post-Procedure Surface Area (cm^2) 0.4 cm^2 08/18/23 1500   Post-Procedure Volume (cm^3) 0.16 cm^3 08/18/23 1500   Wound Healing % 65 08/18/23 1500   Tunneling (cm) 0 cm 08/18/23 1500   Undermining (cm) 0 cm 08/18/23 1500   Wound Odor None 08/18/23 1500   Exposed Structures None 08/18/23 1500   Number of  days: 14       Wound 08/11/23 Full Thickness Wound Foot Lateral Left (Active)   Wound Image    08/18/23 1500   Site Assessment Red 08/18/23 1500   Periwound Assessment Edema;Blanchable erythema;Dry 08/18/23 1500   Margins Attached edges 08/18/23 1500   Drainage Amount Moderate 08/18/23 1500   Drainage Description Sanguineous 08/18/23 1500   Treatments Cleansed;Provider debridement 08/18/23 1500   Wound Cleansing Normal Saline Irrigation 08/18/23 1500   Periwound Protectant Skin Protectant Wipes to Periwound;Barrier Paste 08/18/23 1500   Dressing Status Clean;Intact 08/11/23 1500   Dressing Changed Changed 08/18/23 1500   Dressing Cleansing/Solutions Not Applicable 08/18/23 1500   Dressing Options Collagen Dressing;Hydrofiber Silver;Silicone Adhesive Foam;Silver Strip Packing;Komprex Horseshoe;Hypafix Tape;Tubigrip;Other (Comments) 08/18/23 1500   Dressing Change/Treatment Frequency Weekly, and As Needed 08/18/23 1500   Wound Team Following Bi-Weekly 08/18/23 1500   Non-staged Wound Description Full thickness 08/18/23 1500   Wound Length (cm) 0.8 cm 08/18/23 1500   Wound Width (cm) 0.8 cm 08/18/23 1500   Wound Depth (cm) 0.4 cm 08/18/23 1500   Wound Surface Area (cm^2) 0.64 cm^2 08/18/23 1500   Wound Volume (cm^3) 0.256 cm^3 08/18/23 1500   Post-Procedure Length (cm) 0.9 cm 08/18/23 1500   Post-Procedure Width (cm) 0.9 cm 08/18/23 1500   Post-Procedure Depth (cm) 0.4 cm 08/18/23 1500   Post-Procedure Surface Area (cm^2) 0.81 cm^2 08/18/23 1500   Post-Procedure Volume (cm^3) 0.324 cm^3 08/18/23 1500   Wound Healing % -967 08/18/23 1500   Tunneling (cm) 0 cm 08/18/23 1500   Undermining (cm) 0 cm 08/18/23 1500   Wound Odor None 08/18/23 1500   Number of days: 7       PROCEDURE: Debridement of sacral / coccygeal pressure injury-now presents as to wound  -2% viscous lidocaine applied topically to wound beds for approximately 5 minutes prior to debridement  -Curette used to debride wound bed.  Excisional debridement was  performed to remove devitalized tissue until healthy, bleeding tissue was visualized.   Entire surface of wound, approximately 20 cm² debrided, including into the undermined areas.  Tissue debrided into the muscle / fascia layer.    -Bleeding controlled with manual pressure.    -Wound care completed by wound RN, refer to flowsheet  -Patient tolerated the procedure well, without c/o pain or discomfort.      PROCEDURE: Excisional debridement of left plantar/lateral foot ulcer  -2% viscous lidocaine applied topically to wound bed for approximately 5 minutes prior to debridement  -Curette used to debride wound bed.  Excisional debridement was performed to remove devitalized tissue until healthy, bleeding tissue was visualized.   Entire surface of wound, 0.81 cm² debrided.  Tissue debrided into the subcutaneous layer.    -Bleeding controlled with manual pressure.    -Wound care completed by wound RN, refer to flowsheet  -Patient tolerated the procedure well, without c/o pain or discomfort.      No debridement of left medial lower leg or left posterior lower leg wound  required today.    BIOLOGIC LOG  5/20/2022-first application.  PRODUCT: EpiCord 2 x 3 cm . PRODUCT #[id number]; EXPIRES: 2026-12-01 5/27/2022- second application.  PRODUCT: EpiCordEX 2 x 3 cm . PRODUCT #[id number]; EXPIRES: 2026-09-01    6/3/2022- third application.  PRODUCT: EpiCordEX 2 x 3 cm . PRODUCT #[id number]; EXPIRES: 2026-10-01    6/10/2022- fourth application.  PRODUCT: EpiCordEX 2 x 3 cm . PRODUCT #[id number]; EXPIRES: 2026-09-01 6/17/2022- fifth application.  PRODUCT: EpiCordEX 2 x 3 cm . PRODUCT #[id number]; EXPIRES: 2027-02-01 6/24/2022- sixth application.  PRODUCT: EpiCordEX 2 x 3 cm . PRODUCT #[id number]; EXPIRES: 2027-02-01 07/01/22: Seventh application.  Product: Epicort Dx 2.0 x 3.0 cm reorder number [id number]; product number [id number]; expires 2027-02-01 7/22/2022-  "eighth application.  PRODUCT: EpiCord 2 x 3 cm, reorder #[id number]. PRODUCT #[id number]; EXPIRES: 2027-02-01      Pertinent Labs and Diagnostics:    Labs:     A1c: No results found for: \"HBA1C\"     [facility] results, 7/1/2022 (under media tab)    CRP 13    ESR 31      IMAGING:     10/3/2022-CT of pelvis with contrast  IMPRESSION:     1.  Posterior soft tissue sacral wound and 1.5 x 3.7 cm abscess.   2.  Progressive destruction of the distal sacrum and proximal coccyx. Sclerosis and irregularity of the distal sacrum consistent with osteomyelitis.   3.  Old wound within the soft tissues posterior to the distal left SI joint with possible fistulous communication.    10/3/2022-CT of tib-fib with and without contrast, with reconstruction  IMPRESSION:     1.  Old fractures of the left tibia and fibula with extensive callus formation and bony bridging as well as extensive dystrophic calcifications. Similar to previous.   2.  Distal medial soft tissue wounds with ill-defined superficial in the soft tissue thickening and fluid collections suggestive of phlegmon. No organized abscess identified.   3.  No definite osteomyelitis.   4.  Arthritic changes.        VASCULAR STUDIES: No results found.    LAST  WOUND CULTURE:   Lab Results   Component Value Date/Time    CULTRSULT  06/17/2023 09:43 PM     No growth after 5 days of incubation.  Blood culture testing and Gram stain, if indicated, are  performed at [facility], [address] [place] [address].  Positive blood cultures are  sent to [facility], [address] [place] [address], for organism identification and  susceptibility testing.        PATHOLOGY  2/17/2023-bone fragment extracted from left lower extremity wound\\  FINAL DIAGNOSIS:     A. Left leg bone fragment at base of chronic wound:          Extensively degenerated fibrocartilaginous tissue with a rim of           fibrinopurulent debris          Correlate with " culture findings            Comment: While no residual intact bone is identified, these           findings are suggestive of adjacent osteomyelitis.      ASSESSMENT AND PLAN:     1. Sacral decubitus ulcer, stage IV ([facility])  Comments: Ulcer first noted in early April 2022 as small open area which quickly enlarged.  This ulcer is present distal from previous sacral ulcer which healed after surgery in January.  Patient has history of flap reconstruction x2 to this area.  CT shows progressive destruction of distal sacrum and proximal coccyx.    8/18/2023: Area continues to wax and wane, depth still probes close to bone.  Presents now has 2 distinct wounds with thin skin bridge between them.  The most distal wound is quite small.  - Excisional debridement was performed in clinic, medically necessary to promote wound healing.   -Patient to return to clinic every 2 weeks.  Discussed decreasing frequency of clinic visits with patient today.  He is agreeable.  He also understands that we can increase frequency of his wounds deteriorate.  -Home health to change dressing 2 times per week on the week he comes into the clinic, 3 times on the opposite week.  -Patient does spend a lot of time up in his wheelchair, and feels this is necessary for his quality of life.  He does have an appropriate pressure-relief cushion.  He is very well versed in pressure relieving techniques  - Patient does not currently have follow up with Dr. Saini. If wound deteriorates or fails to improve can consider re-establishing with Dr. Saini for evaluation for coverage options. Patient does not want to pursue at this time and is happy with current conservative approach even with wound worsening.  - Known OM that has already been treated. No utility of Abx unless gross soft tissue infection which does not appear to be present today.    Wound care: Collagen, Hydrofiber silver strip, hydrofiber silver, Mepilex    2. Postoperative wound dehiscence,  subsequent encounter  3. Osteomyelitis of left lower extremity  Comments: On 4/26/2022 patient underwent irrigation and debridement of multiple compartments of the left lower extremity states for pressure injury, with bone excision, and complex closure of chronic wound using biologic skin substitute.  During postop visit in surgeons office on 5/11, surgical site was noted to be dehisced.  Patient was referred to wound clinic for management.    8/18/2023: Area of both the medial wound has decreased in size assessment.  However, tissue quality is poor and this wound tends to wax and wane.  -No excisional debridement of this wound today  -Patient to return to clinic every 2 weeks for assessment and debridement if indicated  -Home health to change dressing 1-2 times per week in between clinic visits OM.  -Suspect underlying OM, however patient does not wish to consider surgical options at this time nor does his surgeon wish to do any further surgery to this leg. 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 There is no gross soft tissue infection and repeated Abx treatment without source control is not indicated.    -We will assess these wounds each clinic visit  -Patient to be mindful of offloading when supine, should assure that his leg is not rotating medially  Wound care: Hydrofiber silver, silicone adhesive foam, tubigrip    3. Deep tissue injury  4. Pressure injury of left foot, stage IV  Comments: DTI to posterior left lower leg first observed in clinic 7/29/2022.  Patient does not know how it started, had been wearing heel float boot consistently.  Evolved into stage IV pressure injury    8/18/2023: Area of posterior wound has again decreased in area, did not require debridement today.  However, this wound tends to wax and wane.  Plantar foot ulcer has increased in area and depth.  -No need for debridement of posterior leg wound today.  -Patient is well versed on offloading  techniques  -Excisional debridement of left plantar foot wound, medically necessary to promote wound healing  -Patient understands he is to offload this area,    Wound care: Both wounds-Silver Hydrofiber to manage exudate and bioburden, foam cover dressing, Tubigrip D to manage edema    5. Pressure injury of left heel, stage 3 ([facility])  Comments: First noted in clinic on 10/21/2022, originally noted to be stage II    8/18/2023: Ulcer remains healed, condition of periwound significantly improved.  No obvious maceration or weeping of skin.    -We will continue to monitor skin integrity of heels each clinic visit  - Patient continue wearing heel float boots at all times  - Heel does not appear to contact any solid surfaces while in wheelchair   Wound Care: Heel Mepilex to reduce pressure and friction    6. Quadriplegia, C5-C7, incomplete ([facility])  Comments: Complicating factor.  Impaired mobility and sensation  -Patient is still spending 7-8 hours/day up in his wheelchair, though he does have appropriate offloading cushion, and knows to reposition frequently.  Wears heel float boots bilaterally at all times        Please note that this note may have been created using voice recognition software. I have worked with technical experts from [employer] to optimize the interface.  I have made every reasonable attempt to correct obvious errors, but there may be errors of grammar and possibly content that I did not discover before finalizing the note.

## 2023-08-19 ENCOUNTER — NON-PROVIDER VISIT (OUTPATIENT)
Dept: CARDIOLOGY | Facility: MEDICAL CENTER | Age: 73
End: 2023-08-19
Payer: MEDICARE

## 2023-08-19 PROCEDURE — 93298 REM INTERROG DEV EVAL SCRMS: CPT | Performed by: INTERNAL MEDICINE

## 2023-08-21 NOTE — CARDIAC REMOTE MONITOR - SCAN
Device transmission reviewed. Device demonstrated appropriate function.       Electronically Signed by: Briana Bell M.D.    8/22/2023  9:36 AM

## 2023-08-25 ENCOUNTER — APPOINTMENT (OUTPATIENT)
Dept: WOUND CARE | Facility: MEDICAL CENTER | Age: 73
End: 2023-08-25
Attending: INTERNAL MEDICINE
Payer: MEDICARE

## 2023-09-01 ENCOUNTER — OFFICE VISIT (OUTPATIENT)
Dept: WOUND CARE | Facility: MEDICAL CENTER | Age: 73
End: 2023-09-01
Attending: INTERNAL MEDICINE
Payer: MEDICARE

## 2023-09-01 VITALS
SYSTOLIC BLOOD PRESSURE: 129 MMHG | HEART RATE: 44 BPM | DIASTOLIC BLOOD PRESSURE: 75 MMHG | RESPIRATION RATE: 17 BRPM | TEMPERATURE: 96.9 F | OXYGEN SATURATION: 97 %

## 2023-09-01 DIAGNOSIS — L89.894 PRESSURE INJURY OF LEFT FOOT, STAGE 4 (HCC): ICD-10-CM

## 2023-09-01 DIAGNOSIS — T14.8XXA DEEP TISSUE INJURY: ICD-10-CM

## 2023-09-01 DIAGNOSIS — T81.31XD POSTOPERATIVE WOUND DEHISCENCE, SUBSEQUENT ENCOUNTER: ICD-10-CM

## 2023-09-01 DIAGNOSIS — M86.9 OSTEOMYELITIS OF LEFT LOWER EXTREMITY (HCC): ICD-10-CM

## 2023-09-01 DIAGNOSIS — L89.623 PRESSURE INJURY OF LEFT HEEL, STAGE 3 (HCC): ICD-10-CM

## 2023-09-01 DIAGNOSIS — G82.54 QUADRIPLEGIA, C5-C7 INCOMPLETE (HCC): ICD-10-CM

## 2023-09-01 DIAGNOSIS — L89.154 SACRAL DECUBITUS ULCER, STAGE IV (HCC): ICD-10-CM

## 2023-09-01 PROCEDURE — 11043 DBRDMT MUSC&/FSCA 1ST 20/<: CPT | Performed by: NURSE PRACTITIONER

## 2023-09-01 PROCEDURE — 99213 OFFICE O/P EST LOW 20 MIN: CPT | Mod: 25

## 2023-09-01 PROCEDURE — 3078F DIAST BP <80 MM HG: CPT | Performed by: NURSE PRACTITIONER

## 2023-09-01 PROCEDURE — 99213 OFFICE O/P EST LOW 20 MIN: CPT | Mod: 25 | Performed by: NURSE PRACTITIONER

## 2023-09-01 PROCEDURE — 11043 DBRDMT MUSC&/FSCA 1ST 20/<: CPT

## 2023-09-01 PROCEDURE — 3074F SYST BP LT 130 MM HG: CPT | Performed by: NURSE PRACTITIONER

## 2023-09-02 NOTE — PROGRESS NOTES
Provider Encounter- Pressure Injury        HISTORY OF PRESENT ILLNESS  Wound History:    START OF CARE IN CLINIC: 6/23/2023 (return to clinic after hospitalization)    REFERRING PROVIDER: Sahara Mendez      WOUNDS- Pressure Injury, Sacrococcygeal, stage IV                                                             Surgical wound dehiscence, left medial lower leg-status post surgical I&D of stage IV pressure injury and           biologic application                    Pressure injury, DTI, left posterior lower leg-first observed in clinic 7/29/2022       Pressure injury stage III L heel - first noted 10/21     Right 2nd toe avulsion - first noted 10/21     Skin tag left posterior ear - first noted 10/21     HISTORY: Patient with history of incomplete quadriplegia referred to [facility] for treatment of a stage IV pressure injury.  He has a history of previous pressure injuries to this area, and underwent muscle flaps in 2019, and then again in 2020.  He was seen in the wound clinic in November 2021 for an ulcer proximal from his current ulcer, and pressure injuries to his left posterior lower leg and left heel.  At that time, it was discovered that the patient had retained VAC foam embedded in the wound bed of the sacral wound.  Attempts were made to get him back to his plastic surgeon, though unsuccessful.  In January he underwent surgical removal of VAC sponge along with excisional debridement of his sacral wound by Dr. Chaves.  After the surgery, his wound went on to heal without incident.   In early April 2022, his home health nurse noted a new sacral ulcer, below the previous ulcer which quickly tripled in size over the following weeks.  The ulcer to his left medial lower leg had also deteriorated, with bone visible at the base..  He was hospitalized from 4/22 until 4/27/2022 and underwent surgery with Dr. Raman on 4/26 for irrigation and debridement of multiple compartments of the left lower extremity, bone  excision, and complex closure of chronic wound using biologic skin substitute.   His sacrococcygeal wound was not surgically addressed during this admission.  He was discharged back to his group home, with home health, and referral to outpatient wound clinic for his sacral wound.  He was instructed to follow-up with his surgeon for his lower leg wound.       Postoperatively, the left medial lower leg incision dehisced.  He was seen by his surgeon at [facility] on 5/11.  The surgeon opted to leave remaining sutures in place, and refer him to the wound clinic for treatment of this wound.   Treatment of this wound was initiated in clinic on 5/12.  During this visit was also noted that his heel DTI had resolved, but that he had a new pressure injury to his left posterior lower extremity.     A new pressure injury was noted to patient's right upper buttock/lower back on 5/20/2022.  Wound was linear in shape, skin discolored but intact.     Abrasion noted to left anterior lower leg.  First observed in clinic on 7/22/2022.  Patient states he bumped his leg into his food tray.     Small DTI noted to patient's left lateral lower leg on 7/29/2022.  Skin intact but discolored.     Large area of deep tissue injury noted to patient's left exterior lower leg.  Patient denied any trauma to this area.  Skin intact.  Wound documented.    1/27/2023: Patient was admitted to [facility] from 1/23-1/25/2023 with gross hematuria. He underwent RICHARD which showed watchman device was in place and he was taken off of Xarelto. While hospitalized wound team was consulted. He was referred back to [facility] and home health upon discharge.    Patient was hospitalized at [facility] for pyelonephritis from 2/26 until 3/2/2023, admitted for fever and general malaise.  He was admitted and initially started on linezolid and meropenem for suspected UTI and history of multidrug-resistant organisms.  Urine cultures were negative. ID was consulted, recommended CT of chest and  abdomen,which were negative for acute findings. However, he was treated with 5 days total course of antibiotics for suspected UTI, and symptoms completely resolved.  During this admission, the inpatient wound team was consulted for treatment of his sacral and lower leg wounds.  A wound culture was taken from his left heel pressure ulcer, negative.  Once stabilized, he was discharged home and referred back to [facility] to resume treatment of his wounds.    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  Never used smokeless tobacco    Patient's problem list, allergies, and current medications reviewed and updated in Epic    Interval History:  Interval History thinned 7/29/2022.  Please see previous notes for complete interval history.     Interval History thinned 1/27/2023. Please see previous note for complete interval history.    Interval History thinned 3/3/2023.  Please see previous notes for complete interval history.      Interval History thinned 8/4/2023.  Please see previous notes for complete interval history.      7/28/2023: Clinic visit with Steve Hodges MD. Patient reports being in normal state of health. Denies any current issues. His lower wounds are largely unchanged. Sacrum continues to enlarge, he reports that he has been up in chair more frequently this week. Patient counseled on risks of enlarging pressure injury including risk that wound may progress, known OM may worsen or expand including causing illness. He is aware of risks and possible other treatment options, he would like to proceed with current treatment.    8/4/2023 : Clinic visit with ADILSON Arthur, FNP-BC,  LILIYA VALERIO.  Anjum states he is feeling well overall.  Left medial lower leg wound has decreased in area significantly.  However, a new area has opened just below sacral ulcer,  from an ulcer by thin skin bridge.  Patient denies any changes to his usual activity.  His left heel wound remains healed, however he does have some slightly denuded skin to the heel.  He was seen by his podiatrist earlier this week, dressing was applied, possibly causing slight maceration.    8/11/2023 : Clinic visit with ADILSON Arthur FNP-BC, CWOCN, CFCN.   Anjum continues to feel well.  Unfortunately, left plantar foot lateral foot wound has reopened slightly.  The skin to his heel is slightly macerated, home health is using smaller heel foam dressing .  Sacrum measures about the same.  There is some friable red tissue along superior wound edge that was treated with AgNO3 today.    8/18/2023 : Clinic visit with ADILSON Arthur FNP-BC, CWOCN, CFCN.   Anjum states he is feeling well.  The plantar foot wound has deteriorated somewhat, area is increased.  Sacral wound is about the same.  Left lower leg wounds are both smaller, though with friable tissue.   Anjum's wounds have been essentially stable for a long time.  We discussed decreasing frequency of clinic visits to every 2 weeks.  Patient is agreeable to this plan.  He understands that we can resume weekly appointments if his wounds deteriorate.    9/1/2023 : Clinic visit with ADILSON Arthur FNP-BC, CWOCN, CFCN.   Anjum states he is in his usual state of health.  Unfortunately, his left heel wound has reopened, and his plantar foot wound has deteriorated.  The medial leg wound has again nearly closed, though tissue is boggy and will likely reopen.  His sacral wound continues to progress, though very slowly.    REVIEW OF SYSTEMS:   Unchanged from previous wound clinic assessment on 8/18/2023, except as noted in interval history above     PHYSICAL EXAMINATION:   BP  129/75   Pulse (!) 44   Temp 36.1 °C (96.9 °F) (Temporal)   Resp 17   SpO2 97%   Physical Exam  Constitutional:       Appearance: He is obese.   Cardiovascular:      Rate and Rhythm: Normal rate.   Pulmonary:      Effort: Pulmonary effort is normal.   Abdominal:      Comments: Colostomy left lower quadrant   Genitourinary:     Comments: Suprapubic catheter to down drain   Skin:     Comments: Stage IV coccygeal pressure injury -wound area slightly smaller, 2 separate wounds  by a very thin skin bridge, moderate serosanguineous drainage, probes close to bone  Full-thickness wound to left medial lower leg -wound area has decreased, tissue boggy, scant amount of serous drainage, no evidence of infection    Stage III pressure injury left posterior heel - reopened, shallow wound bed with thin layer of slough, moderate serosanguineous drainage, no evidence of infection    Stage IV pressure injury plantar foot -wound area has again increased, moderate serosanguineous drainage, no evidence of infection    Stage III pressure injury to posterior left lower leg, recurring-shallow stage III-resolved, though covered with friable epithelium.  High risk for recurrence       Neurological:      Mental Status: He is alert and oriented to person, place, and time.   Psychiatric:         Mood and Affect: Mood normal.         WOUND ASSESSMENT  Wound 06/18/23 Pressure Injury Coccyx Stage IV (Active)   Wound Image    09/01/23 1600   Site Assessment Red;Fragile 09/01/23 1600   Periwound Assessment Intact;Scar tissue 09/01/23 1600   Margins Unattached edges 09/01/23 1600   Drainage Amount Large 09/01/23 1600   Drainage Description Serosanguineous 09/01/23 1600   Treatments Cleansed;Provider debridement;Site care 09/01/23 1600   Wound Cleansing Hypochlorus Acid 09/01/23 1600   Periwound Protectant Skin Protectant Wipes to Periwound;Barrier Paste 09/01/23 1600   Dressing Changed Changed 09/01/23 1600   Dressing Cleansing/Solutions  Normal Saline 09/01/23 1600   Dressing Options Collagen Dressing;Hydrofiber Silver Strip;Hydrofiber;Offloading Dressing - Sacral 09/01/23 1600   Dressing Change/Treatment Frequency Every 72 hrs, and As Needed 09/01/23 1600   Wound Team Following Bi-Monthly 09/01/23 1600   WOUND NURSE ONLY - Pressure Injury Stage 4 09/01/23 1600   Wound Length (cm) 4.3 cm 09/01/23 1600   Wound Width (cm) 2.3 cm 09/01/23 1600   Wound Depth (cm) 0.6 cm 09/01/23 1600   Wound Surface Area (cm^2) 9.89 cm^2 09/01/23 1600   Wound Volume (cm^3) 5.934 cm^3 09/01/23 1600   Post-Procedure Length (cm) 4.3 cm 09/01/23 1600   Post-Procedure Width (cm) 2.5 cm 09/01/23 1600   Post-Procedure Depth (cm) 0.8 cm 09/01/23 1600   Post-Procedure Surface Area (cm^2) 10.75 cm^2 09/01/23 1600   Post-Procedure Volume (cm^3) 8.6 cm^3 09/01/23 1600   Wound Healing % -63 09/01/23 1600   Tunneling (cm) 0 cm 09/01/23 1600   Tunneling Clock Position of Wound 12 05/19/23 1400   Undermining (cm) 1.5 cm 09/01/23 1600   Undermining of Wound, 1st Location From 6 o'clock;To 7 o'clock 09/01/23 1600   Undermining (cm) - 2nd location 3.2 cm 09/01/23 1600   Undermining of Wound, 2nd Location From 11 o'clock;To 2 o'clock 09/01/23 1600   Wound Odor None 09/01/23 1600   Exposed Structures Other (Comments) 09/01/23 1600   Number of days: 75       Wound 06/18/23 Pressure Injury Heel Left Resolving stage IV POA, re-opened 9/1/23 (Active)   Wound Image   09/01/23 1600   Site Assessment Red 09/01/23 1600   Periwound Assessment Maceration;Blanchable erythema 09/01/23 1600   Margins Attached edges 09/01/23 1600   Drainage Amount Moderate 09/01/23 1600   Drainage Description Serosanguineous 09/01/23 1600   Treatments Cleansed;Provider debridement;Site care 09/01/23 1600   Wound Cleansing Hypochlorus Acid 09/01/23 1600   Periwound Protectant Skin Protectant Wipes to Periwound;Barrier Paste 09/01/23 1600   Dressing Status Clean;Dry;Intact 09/01/23 1600   Dressing Changed New 09/01/23 1600    Dressing Cleansing/Solutions Not Applicable 09/01/23 1600   Dressing Options Hydrofiber Silver;Offloading Dressing - Heel;Tubigrip 09/01/23 1600   Dressing Change/Treatment Frequency Every 72 hrs, and As Needed 09/01/23 1600   WOUND NURSE ONLY - Pressure Injury Stage 3 06/23/23 1400   Non-staged Wound Description Full thickness 09/01/23 1600   Wound Length (cm) 1 cm 09/01/23 1600   Wound Width (cm) 3 cm 09/01/23 1600   Wound Depth (cm) 0.1 cm 09/01/23 1600   Wound Surface Area (cm^2) 3 cm^2 09/01/23 1600   Wound Volume (cm^3) 0.3 cm^3 09/01/23 1600   Post-Procedure Length (cm) 0.7 cm 05/19/23 1400   Post-Procedure Width (cm) 0.3 cm 05/19/23 1400   Post-Procedure Depth (cm) 0 cm 05/19/23 1400   Post-Procedure Surface Area (cm^2) 0.21 cm^2 05/19/23 1400   Post-Procedure Volume (cm^3) 0 cm^3 05/19/23 1400   Wound Healing % 70 09/01/23 1600   Tunneling (cm) 0 cm 09/01/23 1600   Undermining (cm) 0 cm 09/01/23 1600   Wound Odor None 09/01/23 1600   Exposed Structures None 09/01/23 1600   Number of days: 75       Wound 06/18/23 Full Thickness Wound Leg Medial Left (Active)   Wound Image   09/01/23 1600   Site Assessment Red;Boggy;Other (Comment) 09/01/23 1600   Periwound Assessment Edema;Blanchable erythema 09/01/23 1600   Margins Unattached edges 09/01/23 1600   Drainage Amount Moderate 09/01/23 1600   Drainage Description Serosanguineous 09/01/23 1600   Treatments Cleansed;Site care 09/01/23 1600   Wound Cleansing Hypochlorus Acid 09/01/23 1600   Periwound Protectant Skin Protectant Wipes to Periwound;Barrier Paste 09/01/23 1600   Dressing Changed Changed 09/01/23 1600   Dressing Cleansing/Solutions Not Applicable 09/01/23 1600   Dressing Options Hydrofiber;Nonadhesive Foam;Offloading Dressing - Sacral;Tubigrip 09/01/23 1600   Dressing Change/Treatment Frequency Every 72 hrs, and As Needed 09/01/23 1600   Wound Team Following Bi-Monthly 09/01/23 1600   Non-staged Wound Description Full thickness 09/01/23 1600   Wound  Length (cm) 1 cm 09/01/23 1600   Wound Width (cm) 2.2 cm 09/01/23 1600   Wound Depth (cm) 0.2 cm 09/01/23 1600   Wound Surface Area (cm^2) 2.2 cm^2 09/01/23 1600   Wound Volume (cm^3) 0.44 cm^3 09/01/23 1600   Post-Procedure Length (cm) 4 cm 07/28/23 1500   Post-Procedure Width (cm) 3 cm 07/28/23 1500   Post-Procedure Depth (cm) 0.3 cm 07/28/23 1500   Post-Procedure Surface Area (cm^2) 12 cm^2 07/28/23 1500   Post-Procedure Volume (cm^3) 3.6 cm^3 07/28/23 1500   Wound Healing % -47 09/01/23 1600   Tunneling (cm) 0 cm 09/01/23 1600   Undermining (cm) 0 cm 09/01/23 1600   Undermining of Wound, 1st Location From 1 o'clock;To 2 o'clock 06/02/23 1600   Undermining (cm) - 2nd location 1.3 cm 06/02/23 1600   Undermining of Wound, 2nd Location From 3 o'clock;To 4 o'clock 06/02/23 1600   Wound Odor None 09/01/23 1600   Exposed Structures None 09/01/23 1600   Number of days: 75       Wound 08/04/23 Coccyx Inferior (Active)   Wound Image    09/01/23 1600   Site Assessment Red;Boggy 09/01/23 1600   Periwound Assessment Scar tissue;Blanchable erythema 09/01/23 1600   Margins Unattached edges 09/01/23 1600   Drainage Amount Moderate 09/01/23 1600   Drainage Description Sanguineous 09/01/23 1600   Treatments Cleansed;Provider debridement;Site care 09/01/23 1600   Wound Cleansing Hypochlorus Acid 09/01/23 1600   Periwound Protectant Skin Protectant Wipes to Periwound;Barrier Paste 09/01/23 1600   Dressing Changed Changed 09/01/23 1600   Dressing Cleansing/Solutions Not Applicable 09/01/23 1600   Dressing Options Hydrofiber;Offloading Dressing - Sacral 09/01/23 1600   Dressing Change/Treatment Frequency Every 72 hrs, and As Needed 09/01/23 1600   Wound Team Following Bi-Monthly 09/01/23 1600   Wound Length (cm) 0.7 cm 09/01/23 1600   Wound Width (cm) 0.5 cm 09/01/23 1600   Wound Depth (cm) 0.5 cm 09/01/23 1600   Wound Surface Area (cm^2) 0.35 cm^2 09/01/23 1600   Wound Volume (cm^3) 0.175 cm^3 09/01/23 1600   Post-Procedure Length  (cm) 0.7 cm 09/01/23 1600   Post-Procedure Width (cm) 0.5 cm 09/01/23 1600   Post-Procedure Depth (cm) 0.7 cm 09/01/23 1600   Post-Procedure Surface Area (cm^2) 0.35 cm^2 09/01/23 1600   Post-Procedure Volume (cm^3) 0.245 cm^3 09/01/23 1600   Wound Healing % 56 09/01/23 1600   Tunneling (cm) 0 cm 09/01/23 1600   Undermining (cm) 0 cm 09/01/23 1600   Wound Odor None 09/01/23 1600   Exposed Structures None 09/01/23 1600   Number of days: 28       Wound 08/11/23 Full Thickness Wound Foot Lateral Left (Active)   Wound Image   09/01/23 1600   Site Assessment Red 09/01/23 1600   Periwound Assessment Blanchable erythema;Edema 09/01/23 1600   Margins Unattached edges 09/01/23 1600   Drainage Amount Large 09/01/23 1600   Drainage Description Sanguineous 09/01/23 1600   Treatments Cleansed;Provider debridement;Site care 09/01/23 1600   Wound Cleansing Hypochlorus Acid 09/01/23 1600   Periwound Protectant Skin Protectant Wipes to Periwound;Barrier Paste 09/01/23 1600   Dressing Status Clean;Intact 08/11/23 1500   Dressing Changed Changed 09/01/23 1600   Dressing Cleansing/Solutions Normal Saline 09/01/23 1600   Dressing Options Collagen Dressing;Hydrofiber Silver Strip;Hydrofiber;Silicone Adhesive Foam;Tubigrip 09/01/23 1600   Dressing Change/Treatment Frequency Every 72 hrs, and As Needed 09/01/23 1600   Wound Team Following Bi-Monthly 09/01/23 1600   Non-staged Wound Description Full thickness 09/01/23 1600   Wound Length (cm) 1 cm 09/01/23 1600   Wound Width (cm) 1.4 cm 09/01/23 1600   Wound Depth (cm) 0.7 cm 09/01/23 1600   Wound Surface Area (cm^2) 1.4 cm^2 09/01/23 1600   Wound Volume (cm^3) 0.98 cm^3 09/01/23 1600   Post-Procedure Length (cm) 1 cm 09/01/23 1600   Post-Procedure Width (cm) 1.5 cm 09/01/23 1600   Post-Procedure Depth (cm) 0.9 cm 09/01/23 1600   Post-Procedure Surface Area (cm^2) 1.5 cm^2 09/01/23 1600   Post-Procedure Volume (cm^3) 1.35 cm^3 09/01/23 1600   Wound Healing % -3983 09/01/23 1600   Tunneling  (cm) 0 cm 09/01/23 1600   Undermining (cm) 0 cm 09/01/23 1600   Wound Odor None 09/01/23 1600   Exposed Structures Other (Comments) 09/01/23 1600   Number of days: 21       PROCEDURE: Debridement of sacral / coccygeal pressure injury-now presents as to wound  -2% viscous lidocaine applied topically to wound beds for approximately 5 minutes prior to debridement  -Curette used to debride wound bed.  Excisional debridement was performed to remove devitalized tissue until healthy, bleeding tissue was visualized.   Entire surface of wound, 10.75 cm² debrided, including into the undermined areas.  Tissue debrided into the muscle / fascia layer.    -Bleeding controlled with manual pressure.    -Wound care completed by wound RN, refer to flowsheet  -Patient tolerated the procedure well, without c/o pain or discomfort.      PROCEDURE: Excisional debridement of left plantar/lateral foot ulcer  -2% viscous lidocaine applied topically to wound bed for approximately 5 minutes prior to debridement  -Curette used to debride wound bed.  Excisional debridement was performed to remove devitalized tissue until healthy, bleeding tissue was visualized.   Entire surface of wound, 1.5 cm² debrided.  Tissue debrided into the muscle layer.    -Bleeding controlled with manual pressure.    -Wound care completed by wound RN, refer to flowsheet  -Patient tolerated the procedure well, without c/o pain or discomfort.      No debridement of left heel, left medial lower leg or left posterior lower leg wound  required today.    BIOLOGIC LOG  5/20/2022-first application.  PRODUCT: EpiCord 2 x 3 cm . PRODUCT #[id number]; EXPIRES: 2026-12-01 5/27/2022- second application.  PRODUCT: EpiCordEX 2 x 3 cm . PRODUCT #[id number]; EXPIRES: 2026-09-01    6/3/2022- third application.  PRODUCT: EpiCordEX 2 x 3 cm . PRODUCT #[id number]; EXPIRES: 2026-10-01    6/10/2022- fourth application.  PRODUCT: EpiCordEX 2 x 3 cm . PRODUCT #EX  [id number]; EXPIRES: 2026-09-01 6/17/2022- fifth application.  PRODUCT: EpiCordEX 2 x 3 cm . PRODUCT #EX [id number]; EXPIRES: 2027-02-01 6/24/2022- sixth application.  PRODUCT: EpiCordEX 2 x 3 cm . PRODUCT #[id number]; EXPIRES: 2027-02-01 07/01/22: Seventh application.  Product: Epicort Dx 2.0 x 3.0 cm reorder number [id number]; product number [id number]; expires 2027-02-01 7/22/2022- eighth application.  PRODUCT: EpiCord 2 x 3 cm, reorder #[id number]. PRODUCT #[id number]; EXPIRES: 2027-02-01      Pertinent Labs and Diagnostics:    Labs:     A1c: No results found for: \"HBA1C\"     [facility] results, 7/1/2022 (under media tab)    CRP 13    ESR 31      IMAGING:     10/3/2022-CT of pelvis with contrast  IMPRESSION:     1.  Posterior soft tissue sacral wound and 1.5 x 3.7 cm abscess.   2.  Progressive destruction of the distal sacrum and proximal coccyx. Sclerosis and irregularity of the distal sacrum consistent with osteomyelitis.   3.  Old wound within the soft tissues posterior to the distal left SI joint with possible fistulous communication.    10/3/2022-CT of tib-fib with and without contrast, with reconstruction  IMPRESSION:     1.  Old fractures of the left tibia and fibula with extensive callus formation and bony bridging as well as extensive dystrophic calcifications. Similar to previous.   2.  Distal medial soft tissue wounds with ill-defined superficial in the soft tissue thickening and fluid collections suggestive of phlegmon. No organized abscess identified.   3.  No definite osteomyelitis.   4.  Arthritic changes.        VASCULAR STUDIES: No results found.    LAST  WOUND CULTURE:   Lab Results   Component Value Date/Time    CULTRSULT  06/17/2023 09:43 PM     No growth after 5 days of incubation.  Blood culture testing and Gram stain, if indicated, are  performed at [facility], [address] [place] [address].  Positive blood cultures " are  sent to [facility], [address] [place] [address], for organism identification and  susceptibility testing.        PATHOLOGY  2/17/2023-bone fragment extracted from left lower extremity wound\\  FINAL DIAGNOSIS:     A. Left leg bone fragment at base of chronic wound:          Extensively degenerated fibrocartilaginous tissue with a rim of           fibrinopurulent debris          Correlate with culture findings            Comment: While no residual intact bone is identified, these           findings are suggestive of adjacent osteomyelitis.      ASSESSMENT AND PLAN:     1. Sacral decubitus ulcer, stage IV ([facility])  Comments: Ulcer first noted in early April 2022 as small open area which quickly enlarged.  This ulcer is present distal from previous sacral ulcer which healed after surgery in January.  Patient has history of flap reconstruction x2 to this area.  CT shows progressive destruction of distal sacrum and proximal coccyx.    9/1/2023: Area and depth continue to wax and wane.  Probes close to bone, new areas of granulation tissue noted  - Excisional debridement was performed in clinic, medically necessary to promote wound healing.   -Patient to return to clinic every 2 weeks.  Discussed decreasing frequency of clinic visits with patient today.  He is agreeable.  He also understands that we can increase frequency of his wounds deteriorate.  -Home health to change dressing 2 times per week on the week he comes into the clinic, 3 times on the opposite week.  -Patient does spend a lot of time up in his wheelchair, and feels this is necessary for his quality of life.  He does have an appropriate pressure-relief cushion.  He is very well versed in pressure relieving techniques  - Patient does not currently have follow up with Dr. Saini. If wound deteriorates or fails to improve can consider re-establishing with Dr. Saini for evaluation for coverage options. Patient does not want to pursue at  this time and is happy with current conservative approach even with wound worsening.  - Known OM that has already been treated. No utility of Abx unless gross soft tissue infection which does not appear to be present today.    Wound care: Collagen, Hydrofiber silver strip, hydrofiber silver, Mepilex    2. Postoperative wound dehiscence, subsequent encounter  3. Osteomyelitis of left lower extremity  Comments: On 4/26/2022 patient underwent irrigation and debridement of multiple compartments of the left lower extremity states for pressure injury, with bone excision, and complex closure of chronic wound using biologic skin substitute.  During postop visit in surgeons office on 5/11, surgical site was noted to be dehisced.  Patient was referred to wound clinic for management.    9/1/2023: Medial wound is again nearly closed, though covered with friable tissue and at high risk for recurrence.  -No excisional debridement of this wound today  -Patient to return to clinic every 2 weeks for assessment and debridement if indicated  -Home health to change dressing 1-2 times per week in between clinic visits OM.  -Suspect underlying OM, however patient does not wish to consider surgical options at this time nor does his surgeon wish to do any further surgery to this leg. 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 There is no gross soft tissue infection and repeated Abx treatment without source control is not indicated.    -We will assess these wounds each clinic visit  -Patient to be mindful of offloading when supine, should assure that his leg is not rotating medially  Wound care: Hydrofiber silver, silicone adhesive foam, tubigrip    3. Deep tissue injury  4. Pressure injury of left foot, stage IV  Comments: DTI to posterior left lower leg first observed in clinic 7/29/2022.  Patient does not know how it started, had been wearing heel float boot consistently.  Evolved into stage IV pressure  injury    9/1/2023: Area and depth of plantar foot ulcer have increased, now into muscle/fascial layer.  Posterior leg wound is resolved, though high risk for recurrence.  -Excisional debridement of foot ulcer in clinic today, medically necessary to promote wound healing  -Patient is well versed on offloading techniques  -Excisional debridement of left plantar foot wound, medically necessary to promote wound healing  -Patient understands he is to offload this area,    Wound care: Foot wound-Silver Hydrofiber to manage exudate and bioburden, foam cover dressing, Tubigrip D to manage edema    5. Pressure injury of left heel, stage 3 ([facility])  Comments: First noted in clinic on 10/21/2022, originally noted to be stage II    9/1/2023: Ulcer has reopened.  Wound bed shallow, did not require debridement today    -We will continue to monitor wound each visit, debride if indicated  - Patient continue wearing heel float boots at all times  - Heel does not appear to contact any solid surfaces while in wheelchair   Wound Care: Heel Mepilex to reduce pressure and friction    6. Quadriplegia, C5-C7, incomplete ([facility])  Comments: Complicating factor.  Impaired mobility and sensation  -Patient is still spending 7-8 hours/day up in his wheelchair, though he does have appropriate offloading cushion, and knows to reposition frequently.  Wears heel float boots bilaterally at all times    My total time spent caring for the patient on the day of the encounter was 20 minutes.   This does not include time spent on separately billable procedures/tests.     Please note that this note may have been created using voice recognition software. I have worked with technical experts from [employer] to optimize the interface.  I have made every reasonable attempt to correct obvious errors, but there may be errors of grammar and possibly content that I did not discover before finalizing the note.

## 2023-09-02 NOTE — PATIENT INSTRUCTIONS
-Keep your wound dressing clean, dry, and intact.    -Change your dressing if it becomes soiled, soaked, or falls off.    -Should you experience any significant changes in your wound(s), such as infection (redness, swelling, localized heat, increased pain, fever > 101 F, chills) or have any questions regarding your home care instructions, please contact the wound center at [phone]. If after hours, contact your primary care physician or go to the hospital emergency room.

## 2023-09-02 NOTE — WOUND TEAM
HH orders updated entered into Epic and routed to [facility] via right fax.

## 2023-09-06 ENCOUNTER — TELEPHONE (OUTPATIENT)
Dept: WOUND CARE | Facility: MEDICAL CENTER | Age: 73
End: 2023-09-06
Payer: MEDICARE

## 2023-09-06 DIAGNOSIS — L08.9 WOUND INFECTION: ICD-10-CM

## 2023-09-06 DIAGNOSIS — T14.8XXA WOUND INFECTION: ICD-10-CM

## 2023-09-06 RX ORDER — AMOXICILLIN AND CLAVULANATE POTASSIUM 875; 125 MG/1; MG/1
1 TABLET, FILM COATED ORAL 2 TIMES DAILY
Qty: 20 TABLET | Refills: 0 | Status: SHIPPED | OUTPATIENT
Start: 2023-09-06 | End: 2023-09-13 | Stop reason: SDUPTHER

## 2023-09-06 NOTE — PROGRESS NOTES
Received message from patient's home health nurse, Jeanine, informed that patient's foot wound is draining more heavily, and periwound is more erythemic.  He also apparently has 2 new wounds to his right buttock.  Culture ordered.  I also sent Rx for Augmentin to patient's pharmacy, will adjust based on culture results.

## 2023-09-06 NOTE — TELEPHONE ENCOUNTER
Jeanine RN from [facility] called with concern over lateral foot wound with increased drainage, with some odor to  left lateral foot wound and 1+ edema to left lower leg. Some striation noted to dorsal foot. Negative to warmth. Denies fever, chills. Requested if possible for RN to return for wound culture through [facility] agency contact, Elvira . Jeanine is monitoring and if symptoms worsen will send to Emergency Department on Friday, 9/8.

## 2023-09-07 ENCOUNTER — TELEPHONE (OUTPATIENT)
Dept: WOUND CARE | Facility: MEDICAL CENTER | Age: 73
End: 2023-09-07
Payer: MEDICARE

## 2023-09-07 NOTE — TELEPHONE ENCOUNTER
Talked with Anjum today. Has started augmentin, wound culture has not been completed as [facility] BUSTAMANTE sources to a contracted lab. Called and advised Anjum to continue with antibiotic. Called Lata BUSTAMANTE to cancel wound culture and to advise of progress of wound if worsening tomorrow.

## 2023-09-13 ENCOUNTER — HOSPITAL ENCOUNTER (OUTPATIENT)
Facility: MEDICAL CENTER | Age: 73
End: 2023-09-13
Attending: STUDENT IN AN ORGANIZED HEALTH CARE EDUCATION/TRAINING PROGRAM
Payer: MEDICARE

## 2023-09-13 ENCOUNTER — OFFICE VISIT (OUTPATIENT)
Dept: WOUND CARE | Facility: MEDICAL CENTER | Age: 73
End: 2023-09-13
Attending: INTERNAL MEDICINE
Payer: MEDICARE

## 2023-09-13 VITALS
HEART RATE: 50 BPM | SYSTOLIC BLOOD PRESSURE: 164 MMHG | TEMPERATURE: 97.7 F | OXYGEN SATURATION: 96 % | RESPIRATION RATE: 18 BRPM | DIASTOLIC BLOOD PRESSURE: 73 MMHG

## 2023-09-13 DIAGNOSIS — L08.9 INFECTED WOUND: Primary | ICD-10-CM

## 2023-09-13 DIAGNOSIS — T81.31XD POSTOPERATIVE WOUND DEHISCENCE, SUBSEQUENT ENCOUNTER: ICD-10-CM

## 2023-09-13 DIAGNOSIS — M86.9 OSTEOMYELITIS OF LEFT LOWER EXTREMITY (HCC): ICD-10-CM

## 2023-09-13 DIAGNOSIS — L89.894 PRESSURE INJURY OF LEFT FOOT, STAGE 4 (HCC): ICD-10-CM

## 2023-09-13 DIAGNOSIS — T14.8XXA WOUND INFECTION: ICD-10-CM

## 2023-09-13 DIAGNOSIS — L08.9 WOUND INFECTION: ICD-10-CM

## 2023-09-13 DIAGNOSIS — T14.8XXA INFECTED WOUND: Primary | ICD-10-CM

## 2023-09-13 DIAGNOSIS — L89.154 SACRAL DECUBITUS ULCER, STAGE IV (HCC): ICD-10-CM

## 2023-09-13 DIAGNOSIS — G82.54 QUADRIPLEGIA, C5-C7 INCOMPLETE (HCC): ICD-10-CM

## 2023-09-13 PROCEDURE — 87077 CULTURE AEROBIC IDENTIFY: CPT

## 2023-09-13 PROCEDURE — 87205 SMEAR GRAM STAIN: CPT

## 2023-09-13 PROCEDURE — 3078F DIAST BP <80 MM HG: CPT | Performed by: STUDENT IN AN ORGANIZED HEALTH CARE EDUCATION/TRAINING PROGRAM

## 2023-09-13 PROCEDURE — 11043 DBRDMT MUSC&/FSCA 1ST 20/<: CPT

## 2023-09-13 PROCEDURE — 3077F SYST BP >= 140 MM HG: CPT | Performed by: STUDENT IN AN ORGANIZED HEALTH CARE EDUCATION/TRAINING PROGRAM

## 2023-09-13 PROCEDURE — 99213 OFFICE O/P EST LOW 20 MIN: CPT | Mod: 25

## 2023-09-13 PROCEDURE — 87186 SC STD MICRODIL/AGAR DIL: CPT

## 2023-09-13 PROCEDURE — 99213 OFFICE O/P EST LOW 20 MIN: CPT | Mod: 25 | Performed by: STUDENT IN AN ORGANIZED HEALTH CARE EDUCATION/TRAINING PROGRAM

## 2023-09-13 PROCEDURE — 11043 DBRDMT MUSC&/FSCA 1ST 20/<: CPT | Performed by: STUDENT IN AN ORGANIZED HEALTH CARE EDUCATION/TRAINING PROGRAM

## 2023-09-13 PROCEDURE — 87076 CULTURE ANAEROBE IDENT EACH: CPT

## 2023-09-13 PROCEDURE — 87070 CULTURE OTHR SPECIMN AEROBIC: CPT

## 2023-09-13 RX ORDER — AMOXICILLIN AND CLAVULANATE POTASSIUM 875; 125 MG/1; MG/1
1 TABLET, FILM COATED ORAL 2 TIMES DAILY
Qty: 14 TABLET | Refills: 0 | Status: SHIPPED | OUTPATIENT
Start: 2023-09-13 | End: 2023-09-20

## 2023-09-13 RX ORDER — DOXYCYCLINE 100 MG/1
100 TABLET ORAL 2 TIMES DAILY
Qty: 28 TABLET | Refills: 0 | Status: SHIPPED | OUTPATIENT
Start: 2023-09-13 | End: 2023-09-20

## 2023-09-14 DIAGNOSIS — L08.9 INFECTED WOUND: ICD-10-CM

## 2023-09-14 DIAGNOSIS — T14.8XXA INFECTED WOUND: ICD-10-CM

## 2023-09-14 LAB
GRAM STN SPEC: NORMAL
SIGNIFICANT IND 70042: NORMAL
SITE SITE: NORMAL
SOURCE SOURCE: NORMAL

## 2023-09-14 NOTE — PROGRESS NOTES
Home Health orders sent to [facility]

## 2023-09-14 NOTE — PROGRESS NOTES
Provider Encounter- Pressure Injury        HISTORY OF PRESENT ILLNESS  Wound History:    START OF CARE IN CLINIC: 6/23/2023 (return to clinic after hospitalization)    REFERRING PROVIDER: Sahara Mendez      WOUNDS- Pressure Injury, Sacrococcygeal, stage IV                                                             Surgical wound dehiscence, left medial lower leg-status post surgical I&D of stage IV pressure injury and           biologic application                    Pressure injury, DTI, left posterior lower leg-first observed in clinic 7/29/2022       Pressure injury stage III L heel - first noted 10/21     Right 2nd toe avulsion - first noted 10/21     Skin tag left posterior ear - first noted 10/21     HISTORY: Patient with history of incomplete quadriplegia referred to [facility] for treatment of a stage IV pressure injury.  He has a history of previous pressure injuries to this area, and underwent muscle flaps in 2019, and then again in 2020.  He was seen in the wound clinic in November 2021 for an ulcer proximal from his current ulcer, and pressure injuries to his left posterior lower leg and left heel.  At that time, it was discovered that the patient had retained VAC foam embedded in the wound bed of the sacral wound.  Attempts were made to get him back to his plastic surgeon, though unsuccessful.  In January he underwent surgical removal of VAC sponge along with excisional debridement of his sacral wound by Dr. Chaves.  After the surgery, his wound went on to heal without incident.   In early April 2022, his home health nurse noted a new sacral ulcer, below the previous ulcer which quickly tripled in size over the following weeks.  The ulcer to his left medial lower leg had also deteriorated, with bone visible at the base..  He was hospitalized from 4/22 until 4/27/2022 and underwent surgery with Dr. Raman on 4/26 for irrigation and debridement of multiple compartments of the left lower extremity, bone  excision, and complex closure of chronic wound using biologic skin substitute.   His sacrococcygeal wound was not surgically addressed during this admission.  He was discharged back to his group home, with home health, and referral to outpatient wound clinic for his sacral wound.  He was instructed to follow-up with his surgeon for his lower leg wound.       Postoperatively, the left medial lower leg incision dehisced.  He was seen by his surgeon at [facility] on 5/11.  The surgeon opted to leave remaining sutures in place, and refer him to the wound clinic for treatment of this wound.   Treatment of this wound was initiated in clinic on 5/12.  During this visit was also noted that his heel DTI had resolved, but that he had a new pressure injury to his left posterior lower extremity.     A new pressure injury was noted to patient's right upper buttock/lower back on 5/20/2022.  Wound was linear in shape, skin discolored but intact.     Abrasion noted to left anterior lower leg.  First observed in clinic on 7/22/2022.  Patient states he bumped his leg into his food tray.     Small DTI noted to patient's left lateral lower leg on 7/29/2022.  Skin intact but discolored.     Large area of deep tissue injury noted to patient's left exterior lower leg.  Patient denied any trauma to this area.  Skin intact.  Wound documented.    1/27/2023: [place] [facility] [place] with gross hematuria. He underwent RICHARD which showed watchman device was in place and he was taken off of Xarelto. While hospitalized wound team was consulted. He was referred back to [facility] and home health upon discharge.    Patient was hospitalized at [facility] for pyelonephritis from 2/26 until 3/2/2023, admitted for fever and general malaise.  He was admitted and initially started on linezolid and meropenem for suspected UTI and history of multidrug-resistant organisms.  Urine cultures were negative. ID was consulted, recommended CT of chest and  abdomen,which were negative for acute findings. However, he was treated with 5 days total course of antibiotics for suspected UTI, and symptoms completely resolved.  During this admission, the inpatient wound team was consulted for treatment of his sacral and lower leg wounds.  A wound culture was taken from his left heel pressure ulcer, negative.  Once stabilized, he was discharged home and referred back to [facility] to resume treatment of his wounds.    Pertinent Medical History: Incomplete quadriplegia, history of stage IV pressure injuries, history of flap procedures to sacral pressure injuries, osteomyelitis, obesity, colostomy in place   Contributing factors: Immobility and Obesity, impaired sensation    Personal support: Attendant-staff at [facility] and home health nursing    TOBACCO USE:   Former smoker, quit in 1977.  Never used smokeless tobacco    Patient's problem list, allergies, and current medications reviewed and updated in Epic    Interval History:  Interval History thinned 7/29/2022.  Please see previous notes for complete interval history.     Interval History thinned 1/27/2023. Please see previous note for complete interval history.    Interval History thinned 3/3/2023.  Please see previous notes for complete interval history.      Interval History thinned 8/4/2023.  Please see previous notes for complete interval history.      7/28/2023: Clinic visit with Steve Hodges MD. Patient reports being in normal state of health. Denies any current issues. His lower wounds are largely unchanged. Sacrum continues to enlarge, he reports that he has been up in chair more frequently this week. Patient counseled on risks of enlarging pressure injury including risk that wound may progress, known OM may worsen or expand including causing illness. He is aware of risks and possible other treatment options, he would like to proceed with current treatment.    8/4/2023 : Clinic visit with ADILSON Arthur, FNP-BC,  LILIYA VALERIO.  Anjum states he is feeling well overall.  Left medial lower leg wound has decreased in area significantly.  However, a new area has opened just below sacral ulcer,  from an ulcer by thin skin bridge.  Patient denies any changes to his usual activity.  His left heel wound remains healed, however he does have some slightly denuded skin to the heel.  He was seen by his podiatrist earlier this week, dressing was applied, possibly causing slight maceration.    8/11/2023 : Clinic visit with ADILSON Arthur FNP-BC, CWOCN, CFCN.   Anjum continues to feel well.  Unfortunately, left plantar foot lateral foot wound has reopened slightly.  The skin to his heel is slightly macerated, home health is using smaller heel foam dressing .  Sacrum measures about the same.  There is some friable red tissue along superior wound edge that was treated with AgNO3 today.    8/18/2023 : Clinic visit with ADILSON Arthur FNP-BC, CWOCN, CFCN.   Anjum states he is feeling well.  The plantar foot wound has deteriorated somewhat, area is increased.  Sacral wound is about the same.  Left lower leg wounds are both smaller, though with friable tissue.   Anjum's wounds have been essentially stable for a long time.  We discussed decreasing frequency of clinic visits to every 2 weeks.  Patient is agreeable to this plan.  He understands that we can resume weekly appointments if his wounds deteriorate.    9/1/2023 : Clinic visit with ADILSON Arthur FNP-BC, CWOCN, CFCN.   Anjum states he is in his usual state of health.  Unfortunately, his left heel wound has reopened, and his plantar foot wound has deteriorated.  The medial leg wound has again nearly closed, though tissue is boggy and will likely reopen.  His sacral wound continues to progress, though very slowly.    9/13/2023: Clinic visit with Steve Hodges MD. Patient reports feeling well. Left plantar lateral foot wound is larger with increased drainage, wound tract  that probes towards dorsal foot with some erythema and edema. Concerning for infection. No fluctuance. Patient has been on Augmentin, culture was cancelled previously as he was taking Abx before culture could be obtained. Due to worsening wound today, will add doxycycline to cover MRSA and obtain culture today, though may be sterile as has been on Abx. Sacral pressure injury with breakdown of previous wound site and larger. Patient is not overly concerned and does not want any surgical intervention.    REVIEW OF SYSTEMS:   Unchanged from previous wound clinic assessment on 9/1/2023, except as noted in interval history above     PHYSICAL EXAMINATION:   BP (!) 164/73 (BP Location: Right arm, Patient Position: Sitting) Comment: RN notified  Pulse (!) 50 Comment: RN notified  Temp 36.5 °C (97.7 °F) (Temporal)   Resp 18   SpO2 96%   Physical Exam  Constitutional:       Appearance: He is obese.   Cardiovascular:      Rate and Rhythm: Normal rate.   Pulmonary:      Effort: Pulmonary effort is normal.   Abdominal:      Comments: Colostomy left lower quadrant   Genitourinary:     Comments: Suprapubic catheter to down drain   Skin:     Comments: Stage IV coccygeal pressure injury - Wound larger, 2 separate wounds  by a very thin skin bridge though undermining connects wounds. moderate serosanguineous drainage, probes close to bone  Full-thickness wound to left medial lower leg - Stable, tissue boggy, scant amount of serous drainage, no evidence of infection    Stage III pressure injury left posterior heel - Resolved with thin epithelium    Stage IV pressure injury plantar foot - Wound measuring larger and deeper with wound tract towards dorsal foot. No purulence, however increased erythema and edema concerning for infection.    Stage III pressure injury to posterior left lower leg, recurring-shallow stage III-resolved, though covered with friable epithelium.  High risk for recurrence       Neurological:       Mental Status: He is alert and oriented to person, place, and time.   Psychiatric:         Mood and Affect: Mood normal.         WOUND ASSESSMENT  Wound 06/18/23 Pressure Injury Coccyx Stage IV (Active)   Wound Image    09/13/23 1530   Site Assessment Red;Fragile 09/13/23 1530   Periwound Assessment Scar tissue;Blanchable erythema 09/13/23 1530   Margins Unattached edges 09/13/23 1530   Drainage Amount Large 09/13/23 1530   Drainage Description Serosanguineous 09/13/23 1530   Treatments Cleansed;Provider debridement 09/13/23 1530   Wound Cleansing Normal Saline Irrigation 09/13/23 1530   Periwound Protectant Skin Protectant Wipes to Periwound;Barrier Paste 09/13/23 1530   Dressing Changed Changed 09/13/23 1530   Dressing Cleansing/Solutions Normal Saline 09/13/23 1530   Dressing Options Hydrofiber Silver Strip;Hydrofiber Silver;Offloading Dressing - Sacral 09/13/23 1530   Dressing Change/Treatment Frequency Every 72 hrs, and As Needed 09/13/23 1530   Wound Team Following Weekly 09/13/23 1530   WOUND NURSE ONLY - Pressure Injury Stage 4 09/13/23 1530   Wound Length (cm) 4 cm 09/13/23 1530   Wound Width (cm) 2.5 cm 09/13/23 1530   Wound Depth (cm) 0.8 cm 09/13/23 1530   Wound Surface Area (cm^2) 10 cm^2 09/13/23 1530   Wound Volume (cm^3) 8 cm^3 09/13/23 1530   Post-Procedure Length (cm) 4.3 cm 09/01/23 1600   Post-Procedure Width (cm) 2.5 cm 09/01/23 1600   Post-Procedure Depth (cm) 0.8 cm 09/01/23 1600   Post-Procedure Surface Area (cm^2) 10.75 cm^2 09/01/23 1600   Post-Procedure Volume (cm^3) 8.6 cm^3 09/01/23 1600   Wound Healing % -120 09/13/23 1530   Tunneling (cm) 0 cm 09/13/23 1530   Tunneling Clock Position of Wound 12 05/19/23 1400   Undermining (cm) 1 cm 09/13/23 1530   Undermining of Wound, 1st Location From 6 o'clock;To 7 o'clock 09/13/23 1530   Undermining (cm) - 2nd location 2.9 cm 09/13/23 1530   Undermining of Wound, 2nd Location From 11 o'clock;To 2 o'clock 09/13/23 1530   Wound Odor None 09/13/23  1530   Exposed Structures Other (Comments) 09/13/23 1530   Number of days: 89       Wound 06/18/23 Pressure Injury Heel Left Resolving stage IV POA, re-opened 9/1/23 (Active)   Wound Image   09/13/23 1530   Site Assessment Other (Comment) 09/13/23 1530   Periwound Assessment Dry;Flaky;Fragile 09/13/23 1530   Margins Attached edges 09/01/23 1600   Drainage Amount None 09/13/23 1530   Drainage Description Serosanguineous 09/01/23 1600   Treatments Cleansed;Site care 09/13/23 1530   Wound Cleansing Foam Cleanser/Washcloth 09/13/23 1530   Periwound Protectant Skin Protectant Wipes to Periwound;Barrier Paste;Skin Moisturizer 09/13/23 1530   Dressing Status Clean;Dry;Intact 09/01/23 1600   Dressing Changed Changed 09/13/23 1530   Dressing Cleansing/Solutions Not Applicable 09/13/23 1530   Dressing Options Offloading Dressing - Heel 09/13/23 1530   Dressing Change/Treatment Frequency Every 72 hrs, and As Needed 09/13/23 1530   WOUND NURSE ONLY - Pressure Injury Stage 3 06/23/23 1400   Non-staged Wound Description Full thickness 09/01/23 1600   Wound Length (cm) 1 cm 09/01/23 1600   Wound Width (cm) 3 cm 09/01/23 1600   Wound Depth (cm) 0.1 cm 09/01/23 1600   Wound Surface Area (cm^2) 3 cm^2 09/01/23 1600   Wound Volume (cm^3) 0.3 cm^3 09/01/23 1600   Post-Procedure Length (cm) 0.7 cm 05/19/23 1400   Post-Procedure Width (cm) 0.3 cm 05/19/23 1400   Post-Procedure Depth (cm) 0 cm 05/19/23 1400   Post-Procedure Surface Area (cm^2) 0.21 cm^2 05/19/23 1400   Post-Procedure Volume (cm^3) 0 cm^3 05/19/23 1400   Wound Healing % 70 09/01/23 1600   Tunneling (cm) 0 cm 09/13/23 1530   Undermining (cm) 0 cm 09/13/23 1530   Wound Odor None 09/13/23 1530   Exposed Structures None 09/13/23 1530   Number of days: 89       Wound 06/18/23 Full Thickness Wound Leg Medial Left (Active)   Wound Image   09/13/23 1530   Site Assessment Boggy 09/13/23 1530   Periwound Assessment Edema;Blanchable erythema 09/13/23 1530   Margins KEN 09/13/23 1530    Drainage Amount Small 09/13/23 1530   Drainage Description Serosanguineous 09/13/23 1530   Treatments Cleansed;Site care 09/13/23 1530   Wound Cleansing Foam Cleanser/Washcloth 09/13/23 1530   Periwound Protectant Skin Protectant Wipes to Periwound;Barrier Paste;Skin Moisturizer 09/13/23 1530   Dressing Changed Changed 09/13/23 1530   Dressing Cleansing/Solutions Not Applicable 09/13/23 1530   Dressing Options Hydrofiber Silver;Offloading Dressing - Sacral;Dry Roll Gauze;Hypafix Tape;Tubigrip 09/13/23 1530   Dressing Change/Treatment Frequency Every 72 hrs, and As Needed 09/13/23 1530   Wound Team Following Weekly 09/13/23 1530   Non-staged Wound Description Full thickness 09/13/23 1530   Wound Length (cm) 1 cm 09/01/23 1600   Wound Width (cm) 2.2 cm 09/01/23 1600   Wound Depth (cm) 0.2 cm 09/01/23 1600   Wound Surface Area (cm^2) 2.2 cm^2 09/01/23 1600   Wound Volume (cm^3) 0.44 cm^3 09/01/23 1600   Post-Procedure Length (cm) 4 cm 07/28/23 1500   Post-Procedure Width (cm) 3 cm 07/28/23 1500   Post-Procedure Depth (cm) 0.3 cm 07/28/23 1500   Post-Procedure Surface Area (cm^2) 12 cm^2 07/28/23 1500   Post-Procedure Volume (cm^3) 3.6 cm^3 07/28/23 1500   Wound Healing % -47 09/01/23 1600   Tunneling (cm) 0 cm 09/13/23 1530   Undermining (cm) 0 cm 09/13/23 1530   Undermining of Wound, 1st Location From 1 o'clock;To 2 o'clock 06/02/23 1600   Undermining (cm) - 2nd location 1.3 cm 06/02/23 1600   Undermining of Wound, 2nd Location From 3 o'clock;To 4 o'clock 06/02/23 1600   Wound Odor None 09/13/23 1530   Exposed Structures None 09/13/23 1530   Number of days: 89       Wound 08/11/23 Full Thickness Wound Foot Lateral Left (Active)   Wound Image    09/13/23 1530   Site Assessment Red;Yellow 09/13/23 1530   Periwound Assessment Blanchable erythema;Edema;Denuded 09/13/23 1530   Margins Unattached edges 09/13/23 1530   Drainage Amount Large 09/13/23 1530   Drainage Description Serosanguineous 09/13/23 1530   Treatments  Cleansed;Provider debridement;Wound culture 09/13/23 1530   Wound Cleansing Normal Saline Irrigation 09/13/23 1530   Periwound Protectant Skin Protectant Wipes to Periwound;Barrier Paste;Skin Moisturizer 09/13/23 1530   Dressing Status Clean;Intact 08/11/23 1500   Dressing Changed Changed 09/13/23 1530   Dressing Cleansing/Solutions Not Applicable 09/13/23 1530   Dressing Options Collagen Dressing;Hydrofiber Silver;Super Absorbent Pad;Dry Roll Gauze;Hypafix Tape;Tubigrip 09/13/23 1530   Dressing Change/Treatment Frequency Every 72 hrs, and As Needed 09/13/23 1530   Wound Team Following Weekly 09/13/23 1530   Non-staged Wound Description Full thickness 09/13/23 1530   Wound Length (cm) 0.9 cm 09/13/23 1530   Wound Width (cm) 1.2 cm 09/13/23 1530   Wound Depth (cm) 0.7 cm 09/13/23 1530   Wound Surface Area (cm^2) 1.08 cm^2 09/13/23 1530   Wound Volume (cm^3) 0.756 cm^3 09/13/23 1530   Post-Procedure Length (cm) 1 cm 09/13/23 1530   Post-Procedure Width (cm) 1.5 cm 09/13/23 1530   Post-Procedure Depth (cm) 0.7 cm 09/13/23 1530   Post-Procedure Surface Area (cm^2) 1.5 cm^2 09/13/23 1530   Post-Procedure Volume (cm^3) 1.05 cm^3 09/13/23 1530   Wound Healing % -3050 09/13/23 1530   Tunneling (cm) 1.6 cm 09/13/23 1530   Tunneling Clock Position of Wound 7 09/13/23 1530   Undermining (cm) 0 cm 09/13/23 1530   Wound Odor None 09/13/23 1530   Exposed Structures Other (Comments) 09/13/23 1530   Number of days: 35       Wound 09/13/23 Full Thickness Wound Coccyx Superior (Active)   Wound Image    09/13/23 1530   Site Assessment Red;Yellow 09/13/23 1530   Periwound Assessment Denuded 09/13/23 1530   Margins Unattached edges 09/13/23 1530   Drainage Amount Moderate 09/13/23 1530   Drainage Description Serosanguineous 09/13/23 1530   Treatments Cleansed;Provider debridement 09/13/23 1530   Wound Cleansing Hypochlorus Acid 09/13/23 1530   Periwound Protectant Skin Protectant Wipes to Periwound;Barrier Paste 09/13/23 1530   Dressing  Cleansing/Solutions Not Applicable 09/13/23 1530   Dressing Options Hydrofiber Silver Strip;Hydrofiber Silver;Offloading Dressing - Sacral 09/13/23 1530   Dressing Change/Treatment Frequency Every 72 hrs, and As Needed 09/13/23 1530   Wound Team Following Weekly 09/13/23 1530   Non-staged Wound Description Full thickness 09/13/23 1530   Wound Length (cm) 0.3 cm 09/13/23 1530   Wound Width (cm) 2 cm 09/13/23 1530   Wound Depth (cm) 0.9 cm 09/13/23 1530   Wound Surface Area (cm^2) 0.6 cm^2 09/13/23 1530   Wound Volume (cm^3) 0.54 cm^3 09/13/23 1530   Post-Procedure Length (cm) 0.5 cm 09/13/23 1530   Post-Procedure Width (cm) 2.2 cm 09/13/23 1530   Post-Procedure Depth (cm) 1 cm 09/13/23 1530   Post-Procedure Surface Area (cm^2) 1.1 cm^2 09/13/23 1530   Post-Procedure Volume (cm^3) 1.1 cm^3 09/13/23 1530   Tunneling (cm) 4.2 cm 09/13/23 1530   Tunneling Clock Position of Wound 5 09/13/23 1530   Undermining (cm) 0 cm 09/13/23 1530   Wound Odor None 09/13/23 1530   Exposed Structures None 09/13/23 1530   Number of days: 2       PROCEDURE: Debridement of sacral / coccygeal pressure injury-now presents as two wounds communicating with undermining.  -2% viscous lidocaine applied topically to wound beds for approximately 5 minutes prior to debridement  -Curette used to debride wound bed.  Excisional debridement was performed to remove devitalized tissue until healthy, bleeding tissue was visualized. Portions of wound, 1.1 cm² debrided, including into the undermined areas.  Tissue debrided into the muscle / fascia layer.    -Bleeding controlled with manual pressure.    -Wound care completed by wound RN, refer to flowsheet  -Patient tolerated the procedure well, without c/o pain or discomfort.      PROCEDURE: Excisional debridement of left plantar/lateral foot ulcer  -2% viscous lidocaine applied topically to wound bed for approximately 5 minutes prior to debridement  -Curette used to debride wound bed.  Excisional debridement  "was performed to remove devitalized tissue until healthy, bleeding tissue was visualized.   Entire surface of wound, 1.5 cm² debrided.  Tissue debrided into the muscle layer.    -Bleeding controlled with manual pressure.    -Wound care completed by wound RN, refer to flowsheet  -Patient tolerated the procedure well, without c/o pain or discomfort.      No debridement of left heel, left medial lower leg or left posterior lower leg wound  required today.    BIOLOGIC LOG  5/20/2022-first application.  PRODUCT: EpiCord 2 x 3 cm . PRODUCT #[id number]; EXPIRES: 2026-12-01 5/27/2022- second application.  PRODUCT: EpiCordEX 2 x 3 cm . PRODUCT #[id number]; EXPIRES: 2026-09-01    6/3/2022- third application.  PRODUCT: EpiCordEX 2 x 3 cm . PRODUCT #[id number]; EXPIRES: 2026-10-01    6/10/2022- fourth application.  PRODUCT: EpiCordEX 2 x 3 cm . PRODUCT #[id number]; EXPIRES: 2026-09-01 6/17/2022- fifth application.  PRODUCT: EpiCordEX 2 x 3 cm . PRODUCT #EX [id number]; EXPIRES: 2027-02-01 6/24/2022- sixth application.  PRODUCT: EpiCordEX 2 x 3 cm . PRODUCT #[id number]; EXPIRES: 2027-02-01 07/01/22: Seventh application.  Product: Epicort Dx 2.0 x 3.0 cm reorder number [id number]; product number [id number]; expires 2027-02-01 7/22/2022- eighth application.  PRODUCT: EpiCord 2 x 3 cm, reorder #[id number]. PRODUCT #[id number]; EXPIRES: 2027-02-01      Pertinent Labs and Diagnostics:    Labs:     A1c: No results found for: \"HBA1C\"     [facility] results, 7/1/2022 (under media tab)    CRP 13    ESR 31      IMAGING:     10/3/2022-CT of pelvis with contrast  IMPRESSION:     1.  Posterior soft tissue sacral wound and 1.5 x 3.7 cm abscess.   2.  Progressive destruction of the distal sacrum and proximal coccyx. Sclerosis and irregularity of the distal sacrum consistent with osteomyelitis.   3.  Old wound within the soft tissues posterior to the distal left SI joint with " possible fistulous communication.    10/3/2022-CT of tib-fib with and without contrast, with reconstruction  IMPRESSION:     1.  Old fractures of the left tibia and fibula with extensive callus formation and bony bridging as well as extensive dystrophic calcifications. Similar to previous.   2.  Distal medial soft tissue wounds with ill-defined superficial in the soft tissue thickening and fluid collections suggestive of phlegmon. No organized abscess identified.   3.  No definite osteomyelitis.   4.  Arthritic changes.        VASCULAR STUDIES: No results found.    LAST  WOUND CULTURE:   Lab Results   Component Value Date/Time    CULTRSULT - 09/13/2023 04:10 PM      PATHOLOGY  2/17/2023-bone fragment extracted from left lower extremity wound\\  FINAL DIAGNOSIS:     A. Left leg bone fragment at base of chronic wound:          Extensively degenerated fibrocartilaginous tissue with a rim of           fibrinopurulent debris          Correlate with culture findings            Comment: While no residual intact bone is identified, these           findings are suggestive of adjacent osteomyelitis.      ASSESSMENT AND PLAN:     1. Sacral decubitus ulcer, stage IV ([facility])  Comments: Ulcer first noted in early April 2022 as small open area which quickly enlarged.  This ulcer is present distal from previous sacral ulcer which healed after surgery in January.  Patient has history of flap reconstruction x2 to this area.  CT shows progressive destruction of distal sacrum and proximal coccyx.    9/13/2023: Wound measuring larger with increased undermining and communication to satellite wound.  Probes close to bone, new areas of granulation tissue noted  - Excisional debridement was performed in clinic, medically necessary to promote wound healing.   -Patient to return to clinic every 2 weeks.  Discussed decreasing frequency of clinic visits with patient today.  He is agreeable.  He also understands that we can increase frequency of  his wounds deteriorate.  -Home health to change dressing 2 times per week on the week he comes into the clinic, 3 times on the opposite week.  -Patient does spend a lot of time up in his wheelchair, and feels this is necessary for his quality of life.  He does have an appropriate pressure-relief cushion.  He is very well versed in pressure relieving techniques  - Patient does not currently have follow up with Dr. Saini. If wound deteriorates or fails to improve can consider re-establishing with Dr. Saini for evaluation for coverage options. Patient does not want to pursue at this time and is happy with current conservative approach even with wound worsening.  - Known OM that has already been treated. No utility of Abx unless gross soft tissue infection which does not appear to be present today.    Wound care: Collagen, Hydrofiber silver strip, hydrofiber silver, Mepilex    2. Postoperative wound dehiscence, subsequent encounter  3. Osteomyelitis of left lower extremity  Comments: On 4/26/2022 patient underwent irrigation and debridement of multiple compartments of the left lower extremity states for pressure injury, with bone excision, and complex closure of chronic wound using biologic skin substitute.  During postop visit in surgeons office on 5/11, surgical site was noted to be dehisced.  Patient was referred to wound clinic for management.    9/13/2023: Medial wound is again nearly closed, though covered with friable tissue and at high risk for recurrence. This wound waxes and wanes with known OM.  -No excisional debridement of this wound today  -Patient to return to clinic every 2 weeks for assessment and debridement if indicated  -Home health to change dressing 1-2 times per week in between clinic visits OM.  -Suspect underlying OM, however patient does not wish to consider surgical options at this time nor does his surgeon wish to do any further surgery to this leg. Patient was treated with Abx for 6 weeks  for OM of this bone in 2022. There is no gross soft tissue infection and repeated Abx treatment without source control is not indicated.    -We will assess these wounds each clinic visit  -Patient to be mindful of offloading when supine, should assure that his leg is not rotating medially  Wound care: Hydrofiber silver, silicone adhesive foam, tubigrip    3. Deep tissue injury  4. Pressure injury of left foot, stage IV  Comments: DTI to posterior left lower leg first observed in clinic 7/29/2022.  Patient does not know how it started, had been wearing heel float boot consistently.  Evolved into stage IV pressure injury    9/13/2023: Area and depth of plantar foot ulcer have increased, now into muscle/fascial layer with increased periwound erythema and edema.  Posterior leg wound is resolved, though high risk for recurrence.  -Excisional debridement of foot ulcer in clinic today, medically necessary to promote wound healing  -Patient is well versed on offloading techniques  -Patient understands he is to offload this area  - Patient has been on Augmentin due to concerns for infection. Wound culture ordered by provider was not obtained before starting Abx. Due to continued worsening will obtain culture today, extend course of Augmentin and add Doxycycline to cover MRSA. Imaging deferred as he has known OM of the leg and suspected in foot and does not want surgical intervention. Do not palpate any fluctuance and even if there is abscess, do not feel that surgical intervention would be indicated with known OM of that extremity. If surgery is required, amputation would be best course of action however patient is hesitant to go through additional surgery at this time. He was counseled on risks of worsening including sepsis and knows to proceed to ED if worsening condition.    Wound care: Foot wound-Silver Hydrofiber to manage exudate and bioburden, foam cover dressing, Tubigrip D to manage edema    5. Pressure injury of  left heel, stage 3 ([facility])  Comments: First noted in clinic on 10/21/2022, originally noted to be stage II    9/13/2023: Wound has resolved with thin fragile epithelium  -We will continue to monitor wound each visit, debride if indicated  - Patient continue wearing heel float boots at all times  - Heel does not appear to contact any solid surfaces while in wheelchair   Wound Care: Heel Mepilex to reduce pressure and friction    6. Quadriplegia, C5-C7, incomplete ([facility])  Comments: Complicating factor.  Impaired mobility and sensation  -Patient is still spending 7-8 hours/day up in his wheelchair, though he does have appropriate offloading cushion, and knows to reposition frequently.  Wears heel float boots bilaterally at all times    My total time spent caring for the patient on the day of the encounter was 20 minutes, reviewing history, assessment, counseling and education, and coordination of care.  This does not include time spent on separately billable procedures/tests.      Please note that this note may have been created using voice recognition software. I have worked with technical experts from [facility] to optimize the interface.  I have made every reasonable attempt to correct obvious errors, but there may be errors of grammar and possibly content that I did not discover before finalizing the note.

## 2023-09-14 NOTE — PATIENT INSTRUCTIONS
-Keep your wound dressing clean, dry, and intact.    -Change your dressing if it becomes soiled, soaked, or falls off.    -Should you experience any significant changes in your wound(s), such as infection (redness, swelling, localized heat, increased pain, fever > 101 F, chills) or have any questions regarding your home care instructions, please contact the wound center at [phone]. If after hours, contact your primary care physician or go to the hospital emergency room.

## 2023-09-18 ENCOUNTER — OFFICE VISIT (OUTPATIENT)
Dept: WOUND CARE | Facility: MEDICAL CENTER | Age: 73
End: 2023-09-18
Attending: INTERNAL MEDICINE
Payer: MEDICARE

## 2023-09-18 VITALS
DIASTOLIC BLOOD PRESSURE: 59 MMHG | RESPIRATION RATE: 18 BRPM | TEMPERATURE: 98.4 F | SYSTOLIC BLOOD PRESSURE: 108 MMHG | HEART RATE: 54 BPM

## 2023-09-18 DIAGNOSIS — L89.623 PRESSURE INJURY OF LEFT HEEL, STAGE 3 (HCC): ICD-10-CM

## 2023-09-18 DIAGNOSIS — M86.9 OSTEOMYELITIS OF LEFT LOWER EXTREMITY (HCC): ICD-10-CM

## 2023-09-18 DIAGNOSIS — T81.31XD POSTOPERATIVE WOUND DEHISCENCE, SUBSEQUENT ENCOUNTER: ICD-10-CM

## 2023-09-18 DIAGNOSIS — L89.154 SACRAL DECUBITUS ULCER, STAGE IV (HCC): ICD-10-CM

## 2023-09-18 DIAGNOSIS — L89.894 PRESSURE INJURY OF LEFT FOOT, STAGE 4 (HCC): ICD-10-CM

## 2023-09-18 DIAGNOSIS — G82.54 QUADRIPLEGIA, C5-C7 INCOMPLETE (HCC): ICD-10-CM

## 2023-09-18 PROCEDURE — 11043 DBRDMT MUSC&/FSCA 1ST 20/<: CPT | Performed by: STUDENT IN AN ORGANIZED HEALTH CARE EDUCATION/TRAINING PROGRAM

## 2023-09-18 PROCEDURE — 99214 OFFICE O/P EST MOD 30 MIN: CPT

## 2023-09-18 PROCEDURE — 3078F DIAST BP <80 MM HG: CPT | Performed by: STUDENT IN AN ORGANIZED HEALTH CARE EDUCATION/TRAINING PROGRAM

## 2023-09-18 PROCEDURE — 11042 DBRDMT SUBQ TIS 1ST 20SQCM/<: CPT

## 2023-09-18 PROCEDURE — 3074F SYST BP LT 130 MM HG: CPT | Performed by: STUDENT IN AN ORGANIZED HEALTH CARE EDUCATION/TRAINING PROGRAM

## 2023-09-18 PROCEDURE — 99214 OFFICE O/P EST MOD 30 MIN: CPT | Mod: 25 | Performed by: STUDENT IN AN ORGANIZED HEALTH CARE EDUCATION/TRAINING PROGRAM

## 2023-09-18 PROCEDURE — 11043 DBRDMT MUSC&/FSCA 1ST 20/<: CPT

## 2023-09-18 NOTE — PATIENT INSTRUCTIONS
-Keep your wound dressing clean, dry, and intact.    -Change your dressing if it becomes soiled, soaked, or falls off.    -Remove your compression wrap on the left lower leg if you have severe pain, severe swelling, numbness, color change, or temperature change in your toes. If you need to remove your compression wrap, do so by unrolling it. Do not cut the compression wrap off to prevent cutting yourself on accident.    -Should you experience any significant changes in your wound(s), such as infection (redness, swelling, localized heat, increased pain, fever > 101 F, chills) or have any questions regarding your home care instructions, please contact the wound center at [phone]. If after hours, contact your primary care physician or go to the hospital emergency room.

## 2023-09-19 ENCOUNTER — NON-PROVIDER VISIT (OUTPATIENT)
Dept: CARDIOLOGY | Facility: MEDICAL CENTER | Age: 73
End: 2023-09-19
Payer: MEDICARE

## 2023-09-19 PROCEDURE — 93298 REM INTERROG DEV EVAL SCRMS: CPT | Performed by: INTERNAL MEDICINE

## 2023-09-19 NOTE — CARDIAC REMOTE MONITOR - SCAN
Device transmission reviewed. Device demonstrated appropriate function.       Electronically Signed by: Briana Bell M.D.    10/4/2023  2:09 PM

## 2023-09-20 ENCOUNTER — TELEPHONE (OUTPATIENT)
Dept: WOUND CARE | Facility: MEDICAL CENTER | Age: 73
End: 2023-09-20
Payer: MEDICARE

## 2023-09-20 NOTE — TELEPHONE ENCOUNTER
Faxed to [facility] prescription stating to stop Doxycycline and continue Augmentin. Scanned into [facility]. Fax [phone].

## 2023-09-20 NOTE — PROGRESS NOTES
Provider Encounter- Pressure Injury        HISTORY OF PRESENT ILLNESS  Wound History:    START OF CARE IN CLINIC: 6/23/2023 (return to clinic after hospitalization)    REFERRING PROVIDER: Sahara Mendez      WOUNDS- Pressure Injury, Sacrococcygeal, stage IV                                                             Surgical wound dehiscence, left medial lower leg-status post surgical I&D of stage IV pressure injury and           biologic application                    Pressure injury, DTI, left posterior lower leg-first observed in clinic 7/29/2022       Pressure injury stage III L heel - first noted 10/21     Right 2nd toe avulsion - first noted 10/21     Skin tag left posterior ear - first noted 10/21     HISTORY: Patient with history of incomplete quadriplegia referred to [facility] for treatment of a stage IV pressure injury.  He has a history of previous pressure injuries to this area, and underwent muscle flaps in 2019, and then again in 2020.  He was seen in the wound clinic in November 2021 for an ulcer proximal from his current ulcer, and pressure injuries to his left posterior lower leg and left heel.  At that time, it was discovered that the patient had retained VAC foam embedded in the wound bed of the sacral wound.  Attempts were made to get him back to his plastic surgeon, though unsuccessful.  In January he underwent surgical removal of VAC sponge along with excisional debridement of his sacral wound by Dr. Chaves.  After the surgery, his wound went on to heal without incident.   In early April 2022, his home health nurse noted a new sacral ulcer, below the previous ulcer which quickly tripled in size over the following weeks.  The ulcer to his left medial lower leg had also deteriorated, with bone visible at the base..  He was hospitalized from 4/22 until 4/27/2022 and underwent surgery with Dr. Raman on 4/26 for irrigation and debridement of multiple compartments of the left lower extremity, bone  excision, and complex closure of chronic wound using biologic skin substitute.   His sacrococcygeal wound was not surgically addressed during this admission.  He was discharged back to his group home, with home health, and referral to outpatient wound clinic for his sacral wound.  He was instructed to follow-up with his surgeon for his lower leg wound.       Postoperatively, the left medial lower leg incision dehisced.  He was seen by his surgeon at [facility] on 5/11.  The surgeon opted to leave remaining sutures in place, and refer him to the wound clinic for treatment of this wound.   Treatment of this wound was initiated in clinic on 5/12.  During this visit was also noted that his heel DTI had resolved, but that he had a new pressure injury to his left posterior lower extremity.     A new pressure injury was noted to patient's right upper buttock/lower back on 5/20/2022.  Wound was linear in shape, skin discolored but intact.     Abrasion noted to left anterior lower leg.  First observed in clinic on 7/22/2022.  Patient states he bumped his leg into his food tray.     Small DTI noted to patient's left lateral lower leg on 7/29/2022.  Skin intact but discolored.     Large area of deep tissue injury noted to patient's left exterior lower leg.  Patient denied any trauma to this area.  Skin intact.  Wound documented.    1/27/2023: Patient was admitted to [facility] from 1/23-1/25/2023 with gross hematuria. He underwent RICHARD which showed watchman device was in place and he was taken off of Xarelto. While hospitalized wound team was consulted. He was referred back to [facility] and home health upon discharge.    Patient was hospitalized at [facility] for pyelonephritis from 2/26 until 3/2/2023, admitted for fever and general malaise.  He was admitted and initially started on linezolid and meropenem for suspected UTI and history of multidrug-resistant organisms.  Urine cultures were negative. ID was consulted, recommended CT of chest and  abdomen,which were negative for acute findings. However, he was treated with 5 days total course of antibiotics for suspected UTI, and symptoms completely resolved.  During this admission, the inpatient wound team was consulted for treatment of his sacral and lower leg wounds.  A wound culture was taken from his left heel pressure ulcer, negative.  Once stabilized, he was discharged home and referred back to [facility] to resume treatment of his wounds.    Pertinent Medical History: Incomplete quadriplegia, history of stage IV pressure injuries, history of flap procedures to sacral pressure injuries, osteomyelitis, obesity, colostomy in place   Contributing factors: Immobility and Obesity, impaired sensation    Personal support: Attendant-staff at [facility] and home health nursing    TOBACCO USE:   Former smoker, quit in 1977.  Never used smokeless tobacco    Patient's problem list, allergies, and current medications reviewed and updated in Epic    Interval History:  Interval History thinned 7/29/2022.  Please see previous notes for complete interval history.     Interval History thinned 1/27/2023. Please see previous note for complete interval history.    Interval History thinned 3/3/2023.  Please see previous notes for complete interval history.      Interval History thinned 8/4/2023.  Please see previous notes for complete interval history.      7/28/2023: Clinic visit with Steve Hodges MD. Patient reports being in normal state of health. Denies any current issues. His lower wounds are largely unchanged. Sacrum continues to enlarge, he reports that he has been up in chair more frequently this week. Patient counseled on risks of enlarging pressure injury including risk that wound may progress, known OM may worsen or expand including causing illness. He is aware of risks and possible other treatment options, he would like to proceed with current treatment.    8/4/2023 : Clinic visit with ADILSON Arthur, FNP-BC,  LILIYA VALERIO.  Anjum states he is feeling well overall.  Left medial lower leg wound has decreased in area significantly.  However, a new area has opened just below sacral ulcer,  from an ulcer by thin skin bridge.  Patient denies any changes to his usual activity.  His left heel wound remains healed, however he does have some slightly denuded skin to the heel.  He was seen by his podiatrist earlier this week, dressing was applied, possibly causing slight maceration.    8/11/2023 : Clinic visit with ADILSON Arthur FNP-BC, CWOCN, CFCN.   Anjum continues to feel well.  Unfortunately, left plantar foot lateral foot wound has reopened slightly.  The skin to his heel is slightly macerated, home health is using smaller heel foam dressing .  Sacrum measures about the same.  There is some friable red tissue along superior wound edge that was treated with AgNO3 today.    8/18/2023 : Clinic visit with ADILSON Arthur FNP-BC, CWOCN, CFCN.   Anjum states he is feeling well.  The plantar foot wound has deteriorated somewhat, area is increased.  Sacral wound is about the same.  Left lower leg wounds are both smaller, though with friable tissue.   Anjum's wounds have been essentially stable for a long time.  We discussed decreasing frequency of clinic visits to every 2 weeks.  Patient is agreeable to this plan.  He understands that we can resume weekly appointments if his wounds deteriorate.    9/1/2023 : Clinic visit with ADILSON Arthur FNP-BC, CWOCN, CFCN.   Anjum states he is in his usual state of health.  Unfortunately, his left heel wound has reopened, and his plantar foot wound has deteriorated.  The medial leg wound has again nearly closed, though tissue is boggy and will likely reopen.  His sacral wound continues to progress, though very slowly.    9/13/2023: Clinic visit with Steve Hodges MD. Patient reports feeling well. Left plantar lateral foot wound is larger with increased drainage, wound tract  that probes towards dorsal foot with some erythema and edema. Concerning for infection. No fluctuance. Patient has been on Augmentin, culture was cancelled previously as he was taking Abx before culture could be obtained. Due to worsening wound today, will add doxycycline to cover MRSA and obtain culture today, though may be sterile as has been on Abx. Sacral pressure injury with breakdown of previous wound site and larger. Patient is not overly concerned and does not want any surgical intervention.    9/18/2023: Clinic visit with Steve Hodges MD. Patient reports feeling in normal state of health. He denies any signs or symptoms of infection currently. Patient left foot wound appears improved. Patient remains on Augmentin. Wound culture was concerning for ESBL Proteus, will discuss case with ID as there are no simple oral options for coverage    REVIEW OF SYSTEMS:   Unchanged from previous wound clinic assessment on 9/13/2023, except as noted in interval history above     PHYSICAL EXAMINATION:   /59 (BP Location: Left arm, Patient Position: Sitting)   Pulse (!) 54 Comment: RN notified  Temp 36.9 °C (98.4 °F) (Temporal)   Resp 18   Physical Exam  Constitutional:       Appearance: He is obese.   Cardiovascular:      Rate and Rhythm: Normal rate.   Pulmonary:      Effort: Pulmonary effort is normal.   Abdominal:      Comments: Colostomy left lower quadrant   Genitourinary:     Comments: Suprapubic catheter to down drain   Skin:     Comments: Stage IV coccygeal pressure injury - Wound unchanged, 2 separate wounds  by thin skin bridge though undermining connects wounds. moderate serosanguineous drainage, probes close to bone    Full-thickness wound to left medial lower leg - Stable, tissue boggy, scant amount of serous drainage, no evidence of infection    Stage III pressure injury left posterior heel - Remains resolved    Stage IV pressure injury plantar foot - Wound measuring smaller. Less  drainage. Erythema has improved.    Stage III pressure injury to posterior left lower leg, recurring-shallow stage III-resolved, though covered with friable epithelium.  High risk for recurrence       Neurological:      Mental Status: He is alert and oriented to person, place, and time.   Psychiatric:         Mood and Affect: Mood normal.         WOUND ASSESSMENT  Wound 06/18/23 Pressure Injury Coccyx Stage IV (Active)   Wound Image   09/18/23 1200   Site Assessment Red;[place] 09/18/23 1200   Periwound Assessment Blanchable erythema;Scar tissue 09/18/23 1200   Margins Epibole (rolled edges);Unattached edges 09/18/23 1200   Drainage Amount Moderate 09/18/23 1200   Drainage Description Serosanguineous 09/18/23 1200   Treatments Cleansed;Provider debridement;Site care 09/18/23 1200   Wound Cleansing Hypochlorus Acid 09/18/23 1200   Periwound Protectant Skin Protectant Wipes to Periwound 09/18/23 1200   Dressing Changed Changed 09/18/23 1200   Dressing Cleansing/Solutions Not Applicable;Normal Saline 09/18/23 1200   Dressing Options Hydrofiber Silver;Offloading Dressing - Sacral 09/18/23 1200   Dressing Change/Treatment Frequency Every 72 hrs, and As Needed 09/18/23 1200   Wound Team Following Weekly 09/18/23 1200   WOUND NURSE ONLY - Pressure Injury Stage 4 09/18/23 1200   Wound Length (cm) 3.6 cm 09/18/23 1200   Wound Width (cm) 2.4 cm 09/18/23 1200   Wound Depth (cm) 1 cm 09/18/23 1200   Wound Surface Area (cm^2) 8.64 cm^2 09/18/23 1200   Wound Volume (cm^3) 8.64 cm^3 09/18/23 1200   Post-Procedure Length (cm) 3.6 cm 09/18/23 1200   Post-Procedure Width (cm) 2.6 cm 09/18/23 1200   Post-Procedure Depth (cm) 1.1 cm 09/18/23 1200   Post-Procedure Surface Area (cm^2) 9.36 cm^2 09/18/23 1200   Post-Procedure Volume (cm^3) 10.296 cm^3 09/18/23 1200   Wound Healing % -137 09/18/23 1200   Tunneling (cm) 0 cm 09/18/23 1200   Tunneling Clock Position of Wound 12 05/19/23 1400   Undermining (cm) 0.7 cm 09/18/23 1200   Undermining  of Wound, 1st Location From 6 o'clock 09/18/23 1200   Undermining (cm) - 2nd location 3.1 cm 09/18/23 1200   Undermining of Wound, 2nd Location From 11 o'clock;From 1 o'clock 09/18/23 1200   Wound Odor None 09/18/23 1200   Exposed Structures Other (Comments) 09/13/23 1530   Number of days: 93       Wound 06/18/23 Pressure Injury Heel Left Resolving stage IV POA, re-opened 9/1/23 (Active)   Wound Image   09/18/23 1200   Site Assessment Fragile;Epithelialization;Pink;Light Purple 09/18/23 1200   Periwound Assessment Dry;Flaky;Fragile 09/18/23 1200   Margins Attached edges 09/18/23 1200   Drainage Amount None 09/18/23 1200   Drainage Description Serosanguineous 09/01/23 1600   Treatments Cleansed;Nonselective debridement;Site care 09/18/23 1200   Wound Cleansing Foam Cleanser/Washcloth 09/18/23 1200   Periwound Protectant Skin Protectant Wipes to Periwound;Barrier Paste 09/18/23 1200   Dressing Status Clean;Dry;Intact 09/18/23 1200   Dressing Changed Changed 09/18/23 1200   Dressing Cleansing/Solutions Not Applicable 09/18/23 1200   Dressing Options Hydrofiber Silver;Offloading Dressing - Heel;Tubigrip 09/18/23 1200   Dressing Change/Treatment Frequency Every 72 hrs, and As Needed 09/18/23 1200   WOUND NURSE ONLY - Pressure Injury Stage 3 06/23/23 1400   Non-staged Wound Description Full thickness 09/18/23 1200   Wound Length (cm) 1 cm 09/18/23 1200   Wound Width (cm) 0.5 cm 09/18/23 1200   Wound Depth (cm) 0.1 cm 09/18/23 1200   Wound Surface Area (cm^2) 0.5 cm^2 09/18/23 1200   Wound Volume (cm^3) 0.05 cm^3 09/18/23 1200   Post-Procedure Length (cm) 0.7 cm 05/19/23 1400   Post-Procedure Width (cm) 0.3 cm 05/19/23 1400   Post-Procedure Depth (cm) 0 cm 05/19/23 1400   Post-Procedure Surface Area (cm^2) 0.21 cm^2 05/19/23 1400   Post-Procedure Volume (cm^3) 0 cm^3 05/19/23 1400   Wound Healing % 95 09/18/23 1200   Tunneling (cm) 0 cm 09/18/23 1200   Undermining (cm) 0 cm 09/18/23 1200   Wound Odor None 09/18/23 1200    Exposed Structures None 09/18/23 1200   Number of days: 93       Wound 06/18/23 Full Thickness Wound Leg Medial Left (Active)   Wound Image   09/18/23 1200   Site Assessment Red;Boggy;Fragile 09/18/23 1200   Periwound Assessment Purple;Red;Blanchable erythema 09/18/23 1200   Margins Attached edges 09/18/23 1200   Drainage Amount Small 09/18/23 1200   Drainage Description Serosanguineous 09/18/23 1200   Treatments Cleansed;Nonselective debridement;Site care 09/18/23 1200   Wound Cleansing Foam Cleanser/Washcloth 09/18/23 1200   Periwound Protectant Skin Protectant Wipes to Periwound;Barrier Paste 09/18/23 1200   Dressing Changed Changed 09/18/23 1200   Dressing Cleansing/Solutions Not Applicable 09/18/23 1200   Dressing Options Hydrofiber Silver;Silicone Adhesive Foam;Dry Roll Gauze;Tubigrip 09/18/23 1200   Dressing Change/Treatment Frequency Every 72 hrs, and As Needed 09/18/23 1200   Wound Team Following Weekly 09/18/23 1200   Non-staged Wound Description Full thickness 09/18/23 1200   Wound Length (cm) 2 cm 09/18/23 1200   Wound Width (cm) 2.5 cm 09/18/23 1200   Wound Depth (cm) 0.2 cm 09/18/23 1200   Wound Surface Area (cm^2) 5 cm^2 09/18/23 1200   Wound Volume (cm^3) 1 cm^3 09/18/23 1200   Post-Procedure Length (cm) 2 cm 09/18/23 1200   Post-Procedure Width (cm) 2.5 cm 09/18/23 1200   Post-Procedure Depth (cm) 0.2 cm 09/18/23 1200   Post-Procedure Surface Area (cm^2) 5 cm^2 09/18/23 1200   Post-Procedure Volume (cm^3) 1 cm^3 09/18/23 1200   Wound Healing % -233 09/18/23 1200   Tunneling (cm) 0 cm 09/18/23 1200   Undermining (cm) 0 cm 09/18/23 1200   Undermining of Wound, 1st Location From 1 o'clock;To 2 o'clock 06/02/23 1600   Undermining (cm) - 2nd location 1.3 cm 06/02/23 1600   Undermining of Wound, 2nd Location From 3 o'clock;To 4 o'clock 06/02/23 1600   Wound Odor None 09/18/23 1200   Exposed Structures None 09/18/23 1200   Number of days: 93       Wound 08/11/23 Full Thickness Wound Foot Lateral Left  (Active)   Wound Image    09/18/23 1200   Site Assessment Bleeding;Red 09/18/23 1200   Periwound Assessment Blanchable erythema;Edema 09/18/23 1200   Margins Unattached edges 09/18/23 1200   Drainage Amount Moderate 09/18/23 1200   Drainage Description Serosanguineous 09/18/23 1200   Treatments Cleansed;Provider debridement;Site care 09/18/23 1200   Wound Cleansing Hypochlorus Acid 09/18/23 1200   Periwound Protectant Skin Protectant Wipes to Periwound 09/18/23 1200   Dressing Status Clean;Intact 08/11/23 1500   Dressing Changed Changed 09/18/23 1200   Dressing Cleansing/Solutions Not Applicable 09/18/23 1200   Dressing Options Puracyn Gel;Hydrofiber Silver;Super Absorbent Pad;Dry Roll Gauze;Hypafix Tape 09/18/23 1200   Dressing Change/Treatment Frequency Every 72 hrs, and As Needed 09/18/23 1200   Wound Team Following Weekly 09/18/23 1200   Non-staged Wound Description Full thickness 09/18/23 1200   Wound Length (cm) 1 cm 09/18/23 1200   Wound Width (cm) 1.2 cm 09/18/23 1200   Wound Depth (cm) 0.6 cm 09/18/23 1200   Wound Surface Area (cm^2) 1.2 cm^2 09/18/23 1200   Wound Volume (cm^3) 0.72 cm^3 09/18/23 1200   Post-Procedure Length (cm) 1.3 cm 09/18/23 1200   Post-Procedure Width (cm) 1.2 cm 09/18/23 1200   Post-Procedure Depth (cm) 0.6 cm 09/18/23 1200   Post-Procedure Surface Area (cm^2) 1.56 cm^2 09/18/23 1200   Post-Procedure Volume (cm^3) 0.936 cm^3 09/18/23 1200   Wound Healing % -2900 09/18/23 1200   Tunneling (cm) 1.7 cm 09/18/23 1200   Tunneling Clock Position of Wound 7 09/18/23 1200   Undermining (cm) 0 cm 09/18/23 1200   Wound Odor Mild 09/18/23 1200   Exposed Structures Other (Comments) 09/13/23 1530   Number of days: 39       Wound 09/13/23 Pressure Injury Coccyx Superior (Active)   Wound Image   09/18/23 1200   Site Assessment Red;Early/partial granulation 09/18/23 1200   Periwound Assessment Denuded 09/13/23 1530   Margins Unattached edges;Epibole (rolled edges) 09/18/23 1200   Drainage Amount  Moderate 09/18/23 1200   Drainage Description Serosanguineous 09/18/23 1200   Treatments Cleansed;Provider debridement;Site care 09/18/23 1200   Wound Cleansing Hypochlorus Acid 09/18/23 1200   Periwound Protectant Skin Protectant Wipes to Periwound;Barrier Paste 09/18/23 1200   Dressing Status Clean;Dry 09/18/23 1200   Dressing Cleansing/Solutions Not Applicable 09/18/23 1200   Dressing Options Hydrofiber Silver Strip;Hydrofiber Silver;Offloading Dressing - Sacral 09/18/23 1200   Dressing Change/Treatment Frequency Every 72 hrs, and As Needed 09/18/23 1200   Wound Team Following Weekly 09/18/23 1200   WOUND NURSE ONLY - Pressure Injury Stage 4 09/18/23 1200   Non-staged Wound Description Full thickness 09/13/23 1530   Wound Length (cm) 0.7 cm 09/18/23 1200   Wound Width (cm) 1.3 cm 09/18/23 1200   Wound Depth (cm) 0.6 cm 09/18/23 1200   Wound Surface Area (cm^2) 0.91 cm^2 09/18/23 1200   Wound Volume (cm^3) 0.546 cm^3 09/18/23 1200   Post-Procedure Length (cm) 0.7 cm 09/18/23 1200   Post-Procedure Width (cm) 1.5 cm 09/18/23 1200   Post-Procedure Depth (cm) 0.6 cm 09/18/23 1200   Post-Procedure Surface Area (cm^2) 1.05 cm^2 09/18/23 1200   Post-Procedure Volume (cm^3) 0.63 cm^3 09/18/23 1200   Wound Healing % -1 09/18/23 1200   Tunneling (cm) 3.2 cm 09/18/23 1200   Tunneling Clock Position of Wound 5 09/18/23 1200   Undermining (cm) 0 cm 09/18/23 1200   Wound Odor None 09/18/23 1200   Exposed Structures None 09/18/23 1200   Number of days: 6       PROCEDURE: Debridement of sacral / coccygeal pressure injury-now presents as two wounds communicating with undermining.  -2% viscous lidocaine applied topically to wound beds for approximately 5 minutes prior to debridement  -Curette used to debride wound bed.  Excisional debridement was performed to remove devitalized tissue until healthy, bleeding tissue was visualized. Portions of wound, 11.97 cm² debrided, including into the undermined areas.  Tissue debrided into the  "muscle / fascia layer.    -Bleeding controlled with manual pressure.    -Wound care completed by wound RN, refer to flowsheet  -Patient tolerated the procedure well, without c/o pain or discomfort.      No debridement of left heel, left medial lower leg or left posterior lower leg wound  required today.    BIOLOGIC LOG  5/20/2022-first application.  PRODUCT: EpiCord 2 x 3 cm . PRODUCT #[id number]; EXPIRES: 2026-12-01 5/27/2022- second application.  PRODUCT: EpiCordEX 2 x 3 cm . PRODUCT #[id number]; EXPIRES: 2026-09-01    6/3/2022- third application.  PRODUCT: EpiCordEX 2 x 3 cm . PRODUCT #[id number]; EXPIRES: 2026-10-01    6/10/2022- fourth application.  PRODUCT: EpiCordEX 2 x 3 cm . PRODUCT #[id number]; EXPIRES: 2026-09-01 6/17/2022- fifth application.  PRODUCT: EpiCordEX 2 x 3 cm . PRODUCT #EX [id number]; EXPIRES: 2027-02-01 6/24/2022- sixth application.  PRODUCT: EpiCordEX 2 x 3 cm . PRODUCT #[id number]; EXPIRES: 2027-02-01 07/01/22: Seventh application.  Product: Epicort Dx 2.0 x 3.0 cm reorder number [id number]; product number [id number]; expires 2027-02-01 7/22/2022- eighth application.  PRODUCT: EpiCord 2 x 3 cm, reorder #[id number]. PRODUCT #[id number]; EXPIRES: 2027-02-01      Pertinent Labs and Diagnostics:    Labs:     A1c: No results found for: \"HBA1C\"     [facility] results, 7/1/2022 (under media tab)    CRP 13    ESR 31      IMAGING:     10/3/2022-CT of pelvis with contrast  IMPRESSION:     1.  Posterior soft tissue sacral wound and 1.5 x 3.7 cm abscess.   2.  Progressive destruction of the distal sacrum and proximal coccyx. Sclerosis and irregularity of the distal sacrum consistent with osteomyelitis.   3.  Old wound within the soft tissues posterior to the distal left SI joint with possible fistulous communication.    10/3/2022-CT of tib-fib with and without contrast, with reconstruction  IMPRESSION:     1.  Old fractures of the left " tibia and fibula with extensive callus formation and bony bridging as well as extensive dystrophic calcifications. Similar to previous.   2.  Distal medial soft tissue wounds with ill-defined superficial in the soft tissue thickening and fluid collections suggestive of phlegmon. No organized abscess identified.   3.  No definite osteomyelitis.   4.  Arthritic changes.        VASCULAR STUDIES: No results found.    LAST  WOUND CULTURE:   Lab Results   Component Value Date/Time    CULTRSULT (A) 09/13/2023 04:10 PM     Growth noted after further incubation, see below for  organism identification.  Light growth usual skin ade.      CULTRSULT (A) 09/13/2023 04:10 PM     Proteus mirabilis ESBL  Light growth  Extended Spectrum Beta-lactamase (ESBL) isolated.  ESBL's may be clinically resistant to therapy with  Penicillins,Cephalosporins or Aztreonam despite  apparent in vitro susceptibility to some of these agents.  The patient requires contact isolation.  Please contact pharmacy or an Infectious Disease Specialist  if you have any questions about appropriate therapy.      CULTRSULT Klebsiella pneumoniae  Light growth   (A) 09/13/2023 04:10 PM    CULTRSULT (A) 09/13/2023 04:10 PM     Methicillin Resistant Staphylococcus aureus  Light growth        PATHOLOGY  2/17/2023-bone fragment extracted from left lower extremity wound\\  FINAL DIAGNOSIS:     A. Left leg bone fragment at base of chronic wound:          Extensively degenerated fibrocartilaginous tissue with a rim of           fibrinopurulent debris          Correlate with culture findings            Comment: While no residual intact bone is identified, these           findings are suggestive of adjacent osteomyelitis.      ASSESSMENT AND PLAN:     1. Sacral decubitus ulcer, stage IV ([facility])  Comments: Ulcer first noted in early April 2022 as small open area which quickly enlarged.  This ulcer is present distal from previous sacral ulcer which healed after surgery in  January.  Patient has history of flap reconstruction x2 to this area.  CT shows progressive destruction of distal sacrum and proximal coccyx.    9/18/2023: Wound is largely unchanged.  - Excisional debridement was performed in clinic, medically necessary to promote wound healing.   -Patient to return to clinic every 2 weeks.  Discussed decreasing frequency of clinic visits with patient today.  He is agreeable.  He also understands that we can increase frequency of his wounds deteriorate.  -Home health to change dressing 2 times per week on the week he comes into the clinic, 3 times on the opposite week.  -Patient does spend a lot of time up in his wheelchair, and feels this is necessary for his quality of life.  He does have an appropriate pressure-relief cushion.  He is very well versed in pressure relieving techniques  - Patient does not currently have follow up with Dr. Saini. If wound deteriorates or fails to improve can consider re-establishing with Dr. Saini for evaluation for coverage options. Patient does not want to pursue at this time and is happy with current conservative approach even with wound worsening.  - Known OM that has already been treated. No utility of Abx unless gross soft tissue infection which does not appear to be present today.    Wound care: Collagen, Hydrofiber silver strip, hydrofiber silver, Mepilex    2. Postoperative wound dehiscence, subsequent encounter  3. Osteomyelitis of left lower extremity  Comments: On 4/26/2022 patient underwent irrigation and debridement of multiple compartments of the left lower extremity states for pressure injury, with bone excision, and complex closure of chronic wound using biologic skin substitute.  During postop visit in surgeons office on 5/11, surgical site was noted to be dehisced.  Patient was referred to wound clinic for management.    9/18/2023: Medial wound is again nearly closed, though covered with friable tissue and at high risk for  recurrence. This wound waxes and wanes with known OM.  -No excisional debridement of this wound today  -Patient to return to clinic every 2 weeks for assessment and debridement if indicated  -Home health to change dressing 1-2 times per week in between clinic visits OM.  -Suspect underlying OM, however patient does not wish to consider surgical options at this time nor does his surgeon wish to do any further surgery to this leg. 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 There is no gross soft tissue infection and repeated Abx treatment without source control is not indicated.    -We will assess these wounds each clinic visit  -Patient to be mindful of offloading when supine, should assure that his leg is not rotating medially  Wound care: Hydrofiber silver, silicone adhesive foam, tubigrip    3. Deep tissue injury  4. Pressure injury of left foot, stage IV  Comments: DTI to posterior left lower leg first observed in clinic 7/29/2022.  Patient does not know how it started, had been wearing heel float boot consistently.  Evolved into stage IV pressure injury    9/18/2023: Wound measuring smaller, infection appears improved.  -Excisional debridement of foot ulcer in clinic today, medically necessary to promote wound healing  -Patient is well versed on offloading techniques  - Patient understands he is to offload this area  - Wound culture is concerning for ESBL Proteus, light growth. Patient remains on Augmentin. Due to drug resistant bacteria I discussed case with KATH DE ANDA. As wound improving will continue with Augmentin for 2 week course and add topical antimicrobial therapy with puracyn gel. If wound worsening I will place referral to ID for assistance with Abx management. Recommend he stop taking Doxycycline.    Wound care: Foot wound-  Puracyn Gel, Silver Hydrofiber to manage exudate and bioburden, foam cover dressing, Tubigrip D to manage edema    5. Pressure injury of left heel, stage  3 ([facility])  Comments: First noted in clinic on 10/21/2022, originally noted to be stage II    9/18/2023: Wound has resolved with thin fragile epithelium  -We will continue to monitor wound each visit, debride if indicated  - Patient continue wearing heel float boots at all times  - Heel does not appear to contact any solid surfaces while in wheelchair   Wound Care: Heel Mepilex to reduce pressure and friction    6. Quadriplegia, C5-C7, incomplete ([facility])  Comments: Complicating factor.  Impaired mobility and sensation  -Patient is still spending 7-8 hours/day up in his wheelchair, though he does have appropriate offloading cushion, and knows to reposition frequently.  Wears heel float boots bilaterally at all times    My total time spent caring for the patient on the day of the encounter was 30 minutes, reviewing history, assessment, counseling and education, and coordination of care.  This does not include time spent on separately billable procedures/tests.    Please note that this note may have been created using voice recognition software. I have worked with technical experts from [facility] to optimize the interface.  I have made every reasonable attempt to correct obvious errors, but there may be errors of grammar and possibly content that I did not discover before finalizing the note.

## 2023-09-26 ENCOUNTER — TELEPHONE (OUTPATIENT)
Dept: WOUND CARE | Facility: MEDICAL CENTER | Age: 73
End: 2023-09-26
Payer: MEDICARE

## 2023-09-26 NOTE — TELEPHONE ENCOUNTER
Telephone call from Lata Solorzano RN and Anjum. Anjum had large bowel movement through rectum and stool got into his sacral wound. Jeanine irrigated and cleaned wound thoroughly including tunnel., prior to replacing dressing.

## 2023-09-27 PROBLEM — E87.20 METABOLIC ACIDOSIS: Status: RESOLVED | Noted: 2023-06-18 | Resolved: 2023-09-27

## 2023-09-27 PROBLEM — E87.6 HYPOKALEMIA: Status: RESOLVED | Noted: 2023-06-18 | Resolved: 2023-09-27

## 2023-09-27 PROBLEM — N39.0 ACUTE UTI: Status: RESOLVED | Noted: 2023-06-18 | Resolved: 2023-09-27

## 2023-09-29 ENCOUNTER — OFFICE VISIT (OUTPATIENT)
Dept: WOUND CARE | Facility: MEDICAL CENTER | Age: 73
End: 2023-09-29
Attending: INTERNAL MEDICINE
Payer: MEDICARE

## 2023-09-29 VITALS
HEART RATE: 44 BPM | OXYGEN SATURATION: 97 % | DIASTOLIC BLOOD PRESSURE: 66 MMHG | RESPIRATION RATE: 18 BRPM | SYSTOLIC BLOOD PRESSURE: 125 MMHG | TEMPERATURE: 97.7 F

## 2023-09-29 DIAGNOSIS — L89.154 SACRAL DECUBITUS ULCER, STAGE IV (HCC): ICD-10-CM

## 2023-09-29 DIAGNOSIS — G82.54 QUADRIPLEGIA, C5-C7 INCOMPLETE (HCC): ICD-10-CM

## 2023-09-29 DIAGNOSIS — T81.31XD POSTOPERATIVE WOUND DEHISCENCE, SUBSEQUENT ENCOUNTER: ICD-10-CM

## 2023-09-29 DIAGNOSIS — L89.894 PRESSURE INJURY OF LEFT FOOT, STAGE 4 (HCC): ICD-10-CM

## 2023-09-29 DIAGNOSIS — L89.623 PRESSURE INJURY OF LEFT HEEL, STAGE 3 (HCC): ICD-10-CM

## 2023-09-29 DIAGNOSIS — M86.9 OSTEOMYELITIS OF LEFT LOWER EXTREMITY (HCC): ICD-10-CM

## 2023-09-29 PROCEDURE — 11042 DBRDMT SUBQ TIS 1ST 20SQCM/<: CPT | Mod: 59 | Performed by: NURSE PRACTITIONER

## 2023-09-29 PROCEDURE — 11042 DBRDMT SUBQ TIS 1ST 20SQCM/<: CPT | Mod: XS

## 2023-09-29 PROCEDURE — 3074F SYST BP LT 130 MM HG: CPT | Performed by: NURSE PRACTITIONER

## 2023-09-29 PROCEDURE — 11043 DBRDMT MUSC&/FSCA 1ST 20/<: CPT | Performed by: NURSE PRACTITIONER

## 2023-09-29 PROCEDURE — 3078F DIAST BP <80 MM HG: CPT | Performed by: NURSE PRACTITIONER

## 2023-09-29 PROCEDURE — 99213 OFFICE O/P EST LOW 20 MIN: CPT | Mod: 25 | Performed by: NURSE PRACTITIONER

## 2023-09-29 PROCEDURE — 99213 OFFICE O/P EST LOW 20 MIN: CPT

## 2023-09-29 NOTE — PATIENT INSTRUCTIONS
-Keep your wound dressing clean, dry, and intact.    -Change your dressing if it becomes soiled, soaked, or falls off.    -Should you experience any significant changes in your wound(s), such as infection (redness, swelling, localized heat, increased pain, fever > 101 F, chills) or have any questions regarding your home care instructions, please contact the wound center at [phone]. If after hours, contact your primary care physician or go to the hospital emergency room.

## 2023-09-29 NOTE — PROGRESS NOTES
Home Health Wound Care orders entered into Epic and routed to [facility] via [employer] at [phone].

## 2023-09-30 NOTE — PROGRESS NOTES
Provider Encounter- Pressure Injury        HISTORY OF PRESENT ILLNESS  Wound History:    START OF CARE IN CLINIC: 6/23/2023 (return to clinic after hospitalization)    REFERRING PROVIDER: Sahara Mendez      WOUNDS- Pressure Injury, Sacrococcygeal, stage IV                                                             Surgical wound dehiscence, left medial lower leg-status post surgical I&D of stage IV pressure injury and           biologic application                    Pressure injury, DTI, left posterior lower leg-first observed in clinic 7/29/2022       Pressure injury stage III L heel - first noted 10/21     Right 2nd toe avulsion - first noted 10/21     Skin tag left posterior ear - first noted 10/21     HISTORY: Patient with history of incomplete quadriplegia referred to [facility] for treatment of a stage IV pressure injury.  He has a history of previous pressure injuries to this area, and underwent muscle flaps in 2019, and then again in 2020.  He was seen in the wound clinic in November 2021 for an ulcer proximal from his current ulcer, and pressure injuries to his left posterior lower leg and left heel.  At that time, it was discovered that the patient had retained VAC foam embedded in the wound bed of the sacral wound.  Attempts were made to get him back to his plastic surgeon, though unsuccessful.  In January he underwent surgical removal of VAC sponge along with excisional debridement of his sacral wound by Dr. Chaves.  After the surgery, his wound went on to heal without incident.   In early April 2022, his home health nurse noted a new sacral ulcer, below the previous ulcer which quickly tripled in size over the following weeks.  The ulcer to his left medial lower leg had also deteriorated, with bone visible at the base..  He was hospitalized from 4/22 until 4/27/2022 and underwent surgery with Dr. Raman on 4/26 for irrigation and debridement of multiple compartments of the left lower extremity, bone  excision, and complex closure of chronic wound using biologic skin substitute.   His sacrococcygeal wound was not surgically addressed during this admission.  He was discharged back to his group home, with home health, and referral to outpatient wound clinic for his sacral wound.  He was instructed to follow-up with his surgeon for his lower leg wound.       Postoperatively, the left medial lower leg incision dehisced.  He was seen by his surgeon at [facility] on 5/11.  The surgeon opted to leave remaining sutures in place, and refer him to the wound clinic for treatment of this wound.   Treatment of this wound was initiated in clinic on 5/12.  During this visit was also noted that his heel DTI had resolved, but that he had a new pressure injury to his left posterior lower extremity.     A new pressure injury was noted to patient's right upper buttock/lower back on 5/20/2022.  Wound was linear in shape, skin discolored but intact.     Abrasion noted to left anterior lower leg.  First observed in clinic on 7/22/2022.  Patient states he bumped his leg into his food tray.     Small DTI noted to patient's left lateral lower leg on 7/29/2022.  Skin intact but discolored.     Large area of deep tissue injury noted to patient's left exterior lower leg.  Patient denied any trauma to this area.  Skin intact.  Wound documented.    1/27/2023: Patient was admitted to [facility] from 1/23-1/25/2023 with gross hematuria. He underwent RICHARD which showed watchman device was in place and he was taken off of Xarelto. While hospitalized wound team was consulted. He was referred back to [facility] and home health upon discharge.    Patient was hospitalized at [facility] for pyelonephritis from 2/26 until 3/2/2023, admitted for fever and general malaise.  He was admitted and initially started on linezolid and meropenem for suspected UTI and history of multidrug-resistant organisms.  Urine cultures were negative. ID was consulted, recommended CT of chest and  abdomen,which were negative for acute findings. However, he was treated with 5 days total course of antibiotics for suspected UTI, and symptoms completely resolved.  During this admission, the inpatient wound team was consulted for treatment of his sacral and lower leg wounds.  A wound culture was taken from his left heel pressure ulcer, negative.  Once stabilized, he was discharged home and referred back to [facility] to resume treatment of his wounds.    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  Never used smokeless tobacco    Patient's problem list, allergies, and current medications reviewed and updated in Epic    Interval History:  Interval History thinned 7/29/2022.  Please see previous notes for complete interval history.     Interval History thinned 1/27/2023. Please see previous note for complete interval history.    Interval History thinned 3/3/2023.  Please see previous notes for complete interval history.      Interval History thinned 8/4/2023.  Please see previous notes for complete interval history.      7/28/2023: Clinic visit with Steve Hodges MD. Patient reports being in normal state of health. Denies any current issues. His lower wounds are largely unchanged. Sacrum continues to enlarge, he reports that he has been up in chair more frequently this week. Patient counseled on risks of enlarging pressure injury including risk that wound may progress, known OM may worsen or expand including causing illness. He is aware of risks and possible other treatment options, he would like to proceed with current treatment.    8/4/2023 : Clinic visit with ADILSON Arthur, FNP-BC,  LILIYA VALERIO.  Anjum states he is feeling well overall.  Left medial lower leg wound has decreased in area significantly.  However, a new area has opened just below sacral ulcer,  from an ulcer by thin skin bridge.  Patient denies any changes to his usual activity.  His left heel wound remains healed, however he does have some slightly denuded skin to the heel.  He was seen by his podiatrist earlier this week, dressing was applied, possibly causing slight maceration.    8/11/2023 : Clinic visit with ADILSON Arthru FNP-BC, CWOCN, CFCN.   Anjum continues to feel well.  Unfortunately, left plantar foot lateral foot wound has reopened slightly.  The skin to his heel is slightly macerated, home health is using smaller heel foam dressing .  Sacrum measures about the same.  There is some friable red tissue along superior wound edge that was treated with AgNO3 today.    8/18/2023 : Clinic visit with ADILSON Arthur FNP-BC, CWOCN, CFCN.   Anjum states he is feeling well.  The plantar foot wound has deteriorated somewhat, area is increased.  Sacral wound is about the same.  Left lower leg wounds are both smaller, though with friable tissue.   Anjum's wounds have been essentially stable for a long time.  We discussed decreasing frequency of clinic visits to every 2 weeks.  Patient is agreeable to this plan.  He understands that we can resume weekly appointments if his wounds deteriorate.    9/1/2023 : Clinic visit with ADILSON Arthur FNP-BC, CWOCN, CFCN.   Anjum states he is in his usual state of health.  Unfortunately, his left heel wound has reopened, and his plantar foot wound has deteriorated.  The medial leg wound has again nearly closed, though tissue is boggy and will likely reopen.  His sacral wound continues to progress, though very slowly.    9/13/2023: Clinic visit with Steve Hodges MD. Patient reports feeling well. Left plantar lateral foot wound is larger with increased drainage, wound tract  that probes towards dorsal foot with some erythema and edema. Concerning for infection. No fluctuance. Patient has been on Augmentin, culture was cancelled previously as he was taking Abx before culture could be obtained. Due to worsening wound today, will add doxycycline to cover MRSA and obtain culture today, though may be sterile as has been on Abx. Sacral pressure injury with breakdown of previous wound site and larger. Patient is not overly concerned and does not want any surgical intervention.    9/18/2023: Clinic visit with Steve Hodges MD. Patient reports feeling in normal state of health. He denies any signs or symptoms of infection currently. Patient left foot wound appears improved. Patient remains on Augmentin. Wound culture was concerning for ESBL Proteus, will discuss case with ID as there are no simple oral options for coverage    9/29/2023 : Clinic visit with ADILSON Arthur, FNP-BC, CWOCN, CFCN.   Turk states he is feeling okay.  His plantar foot wound is again progressing, area has decreased.  The leg wound is nearly resolved, though poor tissue quality as previously noted.  He is left heel wound and left posterior leg wounds are also again resolved.  Unfortunately, his sacrococcygeal wound is larger, now communicates with superior wound.    REVIEW OF SYSTEMS:   Unchanged from previous wound clinic assessment on 9/18/2023, except as noted in interval history above     PHYSICAL EXAMINATION:   /66   Pulse (!) 44   Temp 36.5 °C (97.7 °F)   Resp 18   SpO2 97%   Physical Exam  Constitutional:       Appearance: He is obese.   Cardiovascular:      Rate and Rhythm: Normal rate.   Pulmonary:      Effort: Pulmonary effort is normal.   Abdominal:      Comments: Colostomy left lower quadrant   Genitourinary:     Comments: Suprapubic catheter to down drain   Skin:     Comments: Stage IV coccygeal pressure injury -total wound area has increased, the 2 wounds communicate via wound tunnel between  the 2.  Moderate serosanguineous drainage, probes close to bone    Full-thickness wound to left medial lower leg -nearly resolved, poor tissue quality, scant amount of serous drainage, no evidence of infection    Stage III pressure injury left posterior heel -resolved, covered with fragile epithelium    Stage IV pressure injury plantar foot -wound area and depth have decreased, minimal to moderate serosanguineous drainage, no evidence of infection    Stage III pressure injury to posterior left lower leg, recurring-shallow stage III-remains resolved, though covered with friable epithelium, no drainage today.       Neurological:      Mental Status: He is alert and oriented to person, place, and time.   Psychiatric:         Mood and Affect: Mood normal.         WOUND ASSESSMENT  Wound 06/18/23 Pressure Injury Coccyx Stage IV (Active)   Wound Image    09/29/23 1600   Site Assessment Pink;Red 09/29/23 1600   Periwound Assessment Blanchable erythema;Scar tissue 09/29/23 1600   Margins Epibole (rolled edges);Unattached edges 09/29/23 1600   Drainage Amount Moderate 09/29/23 1600   Drainage Description Serosanguineous 09/29/23 1600   Treatments Cleansed;Provider debridement;Site care 09/29/23 1600   Wound Cleansing Hydrogen Peroxide 09/29/23 1600   Periwound Protectant Skin Protectant Wipes to Periwound;Barrier Paste 09/29/23 1600   Dressing Changed Changed 09/29/23 1600   Dressing Cleansing/Solutions Not Applicable 09/29/23 1600   Dressing Options Hydrofiber Silver Strip;Hydrofiber Silver;Offloading Dressing - Sacral 09/29/23 1600   Dressing Change/Treatment Frequency Every 72 hrs, and As Needed 09/29/23 1600   Wound Team Following Weekly 09/29/23 1600   WOUND NURSE ONLY - Pressure Injury Stage 4 09/29/23 1600   Wound Length (cm) 4 cm 09/29/23 1600   Wound Width (cm) 1.4 cm 09/29/23 1600   Wound Depth (cm) 0.7 cm 09/29/23 1600   Wound Surface Area (cm^2) 5.6 cm^2 09/29/23 1600   Wound Volume (cm^3) 3.92 cm^3 09/29/23 1600    Post-Procedure Length (cm) 4 cm 09/29/23 1600   Post-Procedure Width (cm) 1.4 cm 09/29/23 1600   Post-Procedure Depth (cm) 0.7 cm 09/29/23 1600   Post-Procedure Surface Area (cm^2) 5.6 cm^2 09/29/23 1600   Post-Procedure Volume (cm^3) 3.92 cm^3 09/29/23 1600   Wound Healing % -8 09/29/23 1600   Tunneling (cm) 0 cm 09/18/23 1200   Tunneling Clock Position of Wound 12 09/29/23 1600   Undermining (cm) 1.2 cm 09/29/23 1600   Undermining of Wound, 1st Location From 6 o'clock;To 7 o'clock 09/29/23 1600   Undermining (cm) - 2nd location 3.1 cm 09/18/23 1200   Undermining of Wound, 2nd Location From 11 o'clock;From 1 o'clock 09/18/23 1200   Wound Odor None 09/29/23 1600   Exposed Structures KEN 09/29/23 1600   Number of days: 103       Wound 06/18/23 Pressure Injury Heel Left Resolving stage IV POA, re-opened 9/1/23 (Active)   Wound Image   09/29/23 1600   Site Assessment Epithelialization;Fragile 09/29/23 1600   Periwound Assessment Dry;Fragile;Flaky 09/29/23 1600   Margins Attached edges 09/29/23 1600   Closure Secondary intention 09/29/23 1600   Drainage Amount None 09/29/23 1600   Drainage Description Serosanguineous 09/01/23 1600   Treatments Cleansed;Site care 09/29/23 1600   Wound Cleansing Foam Cleanser/Washcloth 09/29/23 1600   Periwound Protectant Skin Protectant Wipes to Periwound;Barrier Paste 09/29/23 1600   Dressing Status Clean;Dry;Intact 09/18/23 1200   Dressing Changed Changed 09/29/23 1600   Dressing Cleansing/Solutions Not Applicable 09/29/23 1600   Dressing Options Hydrofiber Silver;Offloading Dressing - Heel;Tubigrip 09/29/23 1600   Dressing Change/Treatment Frequency Every 72 hrs, and As Needed 09/29/23 1600   WOUND NURSE ONLY - Pressure Injury Stage 3 06/23/23 1400   Non-staged Wound Description Full thickness 09/29/23 1600   Wound Length (cm) 1 cm 09/18/23 1200   Wound Width (cm) 0.5 cm 09/18/23 1200   Wound Depth (cm) 0.1 cm 09/18/23 1200   Wound Surface Area (cm^2) 0.5 cm^2 09/18/23 1200   Wound  Volume (cm^3) 0.05 cm^3 09/18/23 1200   Post-Procedure Length (cm) 0.7 cm 05/19/23 1400   Post-Procedure Width (cm) 0.3 cm 05/19/23 1400   Post-Procedure Depth (cm) 0 cm 05/19/23 1400   Post-Procedure Surface Area (cm^2) 0.21 cm^2 05/19/23 1400   Post-Procedure Volume (cm^3) 0 cm^3 05/19/23 1400   Wound Healing % 95 09/18/23 1200   Tunneling (cm) 0 cm 09/29/23 1600   Undermining (cm) 0 cm 09/29/23 1600   Wound Odor None 09/29/23 1600   Exposed Structures None 09/29/23 1600   Number of days: 103       Wound 06/18/23 Full Thickness Wound Leg Medial Left (Active)   Wound Image   09/29/23 1600   Site Assessment Boggy;Red;Fragile 09/29/23 1600   Periwound Assessment Purple;Blanchable erythema 09/29/23 1600   Margins Attached edges 09/29/23 1600   Drainage Amount Small 09/29/23 1600   Drainage Description Serosanguineous 09/29/23 1600   Treatments Cleansed;Site care 09/29/23 1600   Wound Cleansing Hypochlorus Acid 09/29/23 1600   Periwound Protectant Skin Protectant Wipes to Periwound;Barrier Paste 09/29/23 1600   Dressing Changed Changed 09/29/23 1600   Dressing Cleansing/Solutions Not Applicable 09/29/23 1600   Dressing Options Hydrofiber Silver;Silicone Adhesive Foam 09/29/23 1600   Dressing Change/Treatment Frequency Every 72 hrs, and As Needed 09/29/23 1600   Wound Team Following Weekly 09/29/23 1600   Non-staged Wound Description Full thickness 09/29/23 1600   Wound Length (cm) 1.7 cm 09/29/23 1600   Wound Width (cm) 1 cm 09/29/23 1600   Wound Depth (cm) 0.1 cm 09/29/23 1600   Wound Surface Area (cm^2) 1.7 cm^2 09/29/23 1600   Wound Volume (cm^3) 0.17 cm^3 09/29/23 1600   Post-Procedure Length (cm) 2 cm 09/18/23 1200   Post-Procedure Width (cm) 2.5 cm 09/18/23 1200   Post-Procedure Depth (cm) 0.2 cm 09/18/23 1200   Post-Procedure Surface Area (cm^2) 5 cm^2 09/18/23 1200   Post-Procedure Volume (cm^3) 1 cm^3 09/18/23 1200   Wound Healing % 43 09/29/23 1600   Tunneling (cm) 0 cm 09/29/23 1600   Undermining (cm) 0 cm  09/29/23 1600   Undermining of Wound, 1st Location From 1 o'clock;To 2 o'clock 06/02/23 1600   Undermining (cm) - 2nd location 1.3 cm 06/02/23 1600   Undermining of Wound, 2nd Location From 3 o'clock;To 4 o'clock 06/02/23 1600   Wound Odor None 09/29/23 1600   Exposed Structures None 09/29/23 1600   Number of days: 103       Wound 08/11/23 Full Thickness Wound Foot Lateral Left (Active)   Wound Image    09/29/23 1600   Site Assessment Bleeding;Red 09/29/23 1600   Periwound Assessment Blanchable erythema;Edema 09/29/23 1600   Margins Unattached edges 09/29/23 1600   Drainage Amount Moderate 09/29/23 1600   Drainage Description Serosanguineous 09/29/23 1600   Treatments Cleansed;Provider debridement;Site care 09/29/23 1600   Wound Cleansing Hypochlorus Acid 09/29/23 1600   Periwound Protectant Skin Protectant Wipes to Periwound;Barrier Paste 09/29/23 1600   Dressing Status Clean;Intact 08/11/23 1500   Dressing Changed Changed 09/29/23 1600   Dressing Cleansing/Solutions Not Applicable 09/29/23 1600   Dressing Options Hydrofiber Silver;Silicone Adhesive Foam;Tubigrip 09/29/23 1600   Dressing Change/Treatment Frequency Every 72 hrs, and As Needed 09/29/23 1600   Wound Team Following Weekly 09/29/23 1600   Non-staged Wound Description Full thickness 09/29/23 1600   Wound Length (cm) 0.9 cm 09/29/23 1600   Wound Width (cm) 0.9 cm 09/29/23 1600   Wound Depth (cm) 0.3 cm 09/29/23 1600   Wound Surface Area (cm^2) 0.81 cm^2 09/29/23 1600   Wound Volume (cm^3) 0.243 cm^3 09/29/23 1600   Post-Procedure Length (cm) 1 cm 09/29/23 1600   Post-Procedure Width (cm) 1 cm 09/29/23 1600   Post-Procedure Depth (cm) 0.3 cm 09/29/23 1600   Post-Procedure Surface Area (cm^2) 1 cm^2 09/29/23 1600   Post-Procedure Volume (cm^3) 0.3 cm^3 09/29/23 1600   Wound Healing % -913 09/29/23 1600   Tunneling (cm) 0 cm 09/29/23 1600   Tunneling Clock Position of Wound 7 09/18/23 1200   Undermining (cm) 0 cm 09/29/23 1600   Wound Odor None 09/29/23 1600    Exposed Structures None 09/29/23 1600   Number of days: 49       Wound 09/13/23 Pressure Injury Coccyx Superior (Active)   Wound Image    09/29/23 1600   Site Assessment Red;Early/partial granulation 09/29/23 1600   Periwound Assessment Dry;Intact 09/29/23 1600   Margins Unattached edges;Epibole (rolled edges) 09/29/23 1600   Drainage Amount Moderate 09/29/23 1600   Drainage Description Serosanguineous 09/29/23 1600   Treatments Cleansed;Provider debridement;Site care 09/29/23 1600   Wound Cleansing Hypochlorus Acid 09/29/23 1600   Periwound Protectant Skin Protectant Wipes to Periwound 09/29/23 1600   Dressing Status Clean;Dry 09/18/23 1200   Dressing Changed Changed 09/29/23 1600   Dressing Cleansing/Solutions Not Applicable 09/29/23 1600   Dressing Options Hydrofiber Silver Strip;Hydrofiber Silver;Offloading Dressing - Sacral 09/29/23 1600   Dressing Change/Treatment Frequency Every 72 hrs, and As Needed 09/29/23 1600   Wound Team Following Weekly 09/29/23 1600   WOUND NURSE ONLY - Pressure Injury Stage 4 09/18/23 1200   Non-staged Wound Description Full thickness 09/29/23 1600   Wound Length (cm) 0.7 cm 09/29/23 1600   Wound Width (cm) 2.7 cm 09/29/23 1600   Wound Depth (cm) 1.3 cm 09/29/23 1600   Wound Surface Area (cm^2) 1.89 cm^2 09/29/23 1600   Wound Volume (cm^3) 2.457 cm^3 09/29/23 1600   Post-Procedure Length (cm) 0.7 cm 09/29/23 1600   Post-Procedure Width (cm) 2.7 cm 09/29/23 1600   Post-Procedure Depth (cm) 1.3 cm 09/29/23 1600   Post-Procedure Surface Area (cm^2) 1.89 cm^2 09/29/23 1600   Post-Procedure Volume (cm^3) 2.457 cm^3 09/29/23 1600   Wound Healing % -355 09/29/23 1600   Tunneling (cm) 3.2 cm 09/18/23 1200   Tunneling Clock Position of Wound 5 09/29/23 1600   Undermining (cm) 0 cm 09/29/23 1600   Wound Odor None 09/29/23 1600   Exposed Structures None 09/29/23 1600   Number of days: 16       PROCEDURE: Excisional debridement of sacral / coccygeal pressure injury-wounds communicate via  tunnel  -2% viscous lidocaine applied topically to wound beds for approximately 5 minutes prior to debridement  -Curette used to debride wound bed.  Excisional debridement was performed to remove devitalized tissue until healthy, bleeding tissue was visualized.  Total wound area debrided approximately 20 cm², including into the wound tunnel.  Tissue debrided into the muscle / fascia layer.    -Bleeding controlled with manual pressure.    -Wound care completed by wound RN, refer to flowsheet  -Patient tolerated the procedure well, without c/o pain or discomfort.      PROCEDURE: Excisional debridement of left plantar/lateral foot ulcer  -2% viscous lidocaine applied topically to wound bed for approximately 5 minutes prior to debridement  -Curette used to debride wound bed.  Excisional debridement was performed to remove devitalized tissue until healthy, bleeding tissue was visualized.   Entire surface of wound, 1.0 cm² debrided.  Tissue debrided into the subcutaneous layer.    -Bleeding controlled with manual pressure.    -Wound care completed by wound RN, refer to flowsheet  -Patient tolerated the procedure well, without c/o pain or discomfort.      No debridement of left heel, left medial lower leg, or left posterior leg wounds required today.    BIOLOGIC LOG  5/20/2022-first application.  PRODUCT: EpiCord 2 x 3 cm . PRODUCT #[id number]; EXPIRES: 2026-12-01 5/27/2022- second application.  PRODUCT: EpiCordEX 2 x 3 cm . PRODUCT #[id number]; EXPIRES: 2026-09-01    6/3/2022- third application.  PRODUCT: EpiCordEX 2 x 3 cm . PRODUCT #[id number]; EXPIRES: 2026-10-01    6/10/2022- fourth application.  PRODUCT: EpiCordEX 2 x 3 cm . PRODUCT #[id number]; EXPIRES: 2026-09-01 6/17/2022- fifth application.  PRODUCT: EpiCordEX 2 x 3 cm . PRODUCT #[id number]; EXPIRES: 2027-02-01 6/24/2022- sixth application.  PRODUCT: EpiCordEX 2 x 3 cm . PRODUCT #[id number]; EXPIRES:  "2027-02-01 07/01/22: Seventh application.  Product: Epicort Dx 2.0 x 3.0 cm reorder number [id number]; product number [id number]; expires 2027-02-01 7/22/2022- eighth application.  PRODUCT: EpiCord 2 x 3 cm, reorder #[id number]. PRODUCT #[id number]; EXPIRES: 2027-02-01      Pertinent Labs and Diagnostics:    Labs:     A1c: No results found for: \"HBA1C\"     [facility] results, 7/1/2022 (under media tab)    CRP 13    ESR 31      IMAGING:     10/3/2022-CT of pelvis with contrast  IMPRESSION:     1.  Posterior soft tissue sacral wound and 1.5 x 3.7 cm abscess.   2.  Progressive destruction of the distal sacrum and proximal coccyx. Sclerosis and irregularity of the distal sacrum consistent with osteomyelitis.   3.  Old wound within the soft tissues posterior to the distal left SI joint with possible fistulous communication.    10/3/2022-CT of tib-fib with and without contrast, with reconstruction  IMPRESSION:     1.  Old fractures of the left tibia and fibula with extensive callus formation and bony bridging as well as extensive dystrophic calcifications. Similar to previous.   2.  Distal medial soft tissue wounds with ill-defined superficial in the soft tissue thickening and fluid collections suggestive of phlegmon. No organized abscess identified.   3.  No definite osteomyelitis.   4.  Arthritic changes.        VASCULAR STUDIES: No results found.    LAST  WOUND CULTURE:   Lab Results   Component Value Date/Time    CULTRSULT (A) 09/13/2023 04:10 PM     Growth noted after further incubation, see below for  organism identification.  Light growth usual skin ade.      CULTRSULT (A) 09/13/2023 04:10 PM     Proteus mirabilis ESBL  Light growth  Extended Spectrum Beta-lactamase (ESBL) isolated.  ESBL's may be clinically resistant to therapy with  Penicillins,Cephalosporins or Aztreonam despite  apparent in vitro susceptibility to some of these agents.  The patient requires contact isolation.  Please contact " pharmacy or an Infectious Disease Specialist  if you have any questions about appropriate therapy.      CULTRSULT Klebsiella pneumoniae  Light growth   (A) 09/13/2023 04:10 PM    CULTRSULT (A) 09/13/2023 04:10 PM     Methicillin Resistant Staphylococcus aureus  Light growth        PATHOLOGY  2/17/2023-bone fragment extracted from left lower extremity wound\\  FINAL DIAGNOSIS:     A. Left leg bone fragment at base of chronic wound:          Extensively degenerated fibrocartilaginous tissue with a rim of           fibrinopurulent debris          Correlate with culture findings            Comment: While no residual intact bone is identified, these           findings are suggestive of adjacent osteomyelitis.      ASSESSMENT AND PLAN:     1. Sacral decubitus ulcer, stage IV ([facility])  Comments: Ulcer first noted in early April 2022 as small open area which quickly enlarged.  This ulcer is present distal from previous sacral ulcer which healed after surgery in January.  Patient has history of flap reconstruction x2 to this area.  CT shows progressive destruction of distal sacrum and proximal coccyx.    9/29/2023: Wound area has not changed significantly.  The 2 wounds communicate via tunnel  - Excisional debridement was performed in clinic, medically necessary to promote wound healing.   -Patient to return to clinic every 2 weeks.  He understands that we can increase frequency of clinic visits if wounds deteriorate.  -Home health to change dressing 2 times per week on the week he comes into the clinic, 3 times on the opposite week.  -Patient does spend a lot of time up in his wheelchair, and feels this is necessary for his quality of life.  He does have an appropriate pressure-relief cushion.  He is very well versed in pressure relieving techniques  - Patient does not currently have follow up with Dr. Saini. If wound deteriorates or fails to improve can consider re-establishing with Dr. Saini for evaluation for coverage  options. Patient does not want to pursue at this time and is happy with current conservative approach even with wound worsening.  - Known OM that has already been treated. No utility of Abx unless gross soft tissue infection which does not appear to be present today.    Wound care: Collagen, Hydrofiber silver strip, hydrofiber silver, Mepilex    2. Postoperative wound dehiscence, subsequent encounter  3. Osteomyelitis of left lower extremity  Comments: On 4/26/2022 patient underwent irrigation and debridement of multiple compartments of the left lower extremity states for pressure injury, with bone excision, and complex closure of chronic wound using biologic skin substitute.  During postop visit in surgeons office on 5/11, surgical site was noted to be dehisced.  Patient was referred to wound clinic for management.    9/29/2023: This wound is again nearly resolved, though with friable epithelium.  Known history of OM.  Patient has opted not to pursue more aggressive treatment.  -No excisional debridement of this wound today  -Patient to return to clinic every 2 weeks for assessment and debridement if indicated  -Home health to change dressing 1-2 times per week in between clinic visits OM.  -Suspect underlying OM, however patient does not wish to consider surgical options at this time nor does his surgeon wish to do any further surgery to this leg. 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 There is no gross soft tissue infection and repeated Abx treatment without source control is not indicated.    -We will assess these wounds each clinic visit  -Patient to be mindful of offloading when supine, should assure that his leg is not rotating medially  Wound care: Hydrofiber silver, silicone adhesive foam, tubigrip    3. Deep tissue injury  4. Pressure injury of left foot, stage IV  Comments: DTI to posterior left lower leg first observed in clinic 7/29/2022.  Patient does not know how it  started, had been wearing heel float boot consistently.  Evolved into stage IV pressure injury    9/29/2023: Skin intact over site of posterior leg DTI, tends to wax and wane.  Plantar/lateral foot ulcer is currently progressing, area and depth have decreased.  -Excisional debridement of foot ulcer in clinic today, medically necessary to promote wound healing  -Patient is well versed on offloading techniques  - Patient to be mindful of offloading his foot and posterior leg  - Wound culture is concerning for ESBL Proteus, light growth. Patient remains on Augmentin. Due to drug resistant bacteria I discussed case with ID ADILSON. As wound improving will continue with Augmentin for 2 week course and add topical antimicrobial therapy with puracyn gel. If wound worsening I will place referral to ID for assistance with Abx management. Recommend he stop taking Doxycycline.    Wound care: Foot wound-  Puracyn Gel, Silver Hydrofiber to manage exudate and bioburden, foam cover dressing, Tubigrip D to manage edema    5. Pressure injury of left heel, stage 3 ([facility])  Comments: First noted in clinic on 10/21/2022, originally noted to be stage II    9/29/2023: Wound remains resolved, though covered with fragile epithelium  -We will continue to monitor wound each visit, initiate treatment as indicated  - Patient continue wearing heel float boots at all times  - Heel does not appear to contact any solid surfaces while in wheelchair   Wound Care: Heel Mepilex to reduce pressure and friction    6. Quadriplegia, C5-C7, incomplete ([facility])  Comments: Complicating factor.  Impaired mobility and sensation  -Patient is still spending 7-8 hours/day up in his wheelchair, though he does have appropriate offloading cushion, and knows to reposition frequently.  Wears heel float boots bilaterally at all times    My total time spent caring for the patient on the day of the encounter was 20 minutes, reviewing history, assessment, counseling and  education, and coordination of care.  This does not include time spent on separately billable procedures/tests.    Please note that this note may have been created using voice recognition software. I have worked with technical experts from [facility] to optimize the interface.  I have made every reasonable attempt to correct obvious errors, but there may be errors of grammar and possibly content that I did not discover before finalizing the note.

## 2023-10-06 ENCOUNTER — OFFICE VISIT (OUTPATIENT)
Dept: WOUND CARE | Facility: MEDICAL CENTER | Age: 73
End: 2023-10-06
Attending: INTERNAL MEDICINE
Payer: MEDICARE

## 2023-10-06 DIAGNOSIS — L89.154 SACRAL DECUBITUS ULCER, STAGE IV (HCC): ICD-10-CM

## 2023-10-06 DIAGNOSIS — L89.894 PRESSURE INJURY OF LEFT FOOT, STAGE 4 (HCC): ICD-10-CM

## 2023-10-06 DIAGNOSIS — M86.9 OSTEOMYELITIS OF LEFT LOWER EXTREMITY (HCC): ICD-10-CM

## 2023-10-06 DIAGNOSIS — T81.31XD POSTOPERATIVE WOUND DEHISCENCE, SUBSEQUENT ENCOUNTER: ICD-10-CM

## 2023-10-06 DIAGNOSIS — G82.54 QUADRIPLEGIA, C5-C7 INCOMPLETE (HCC): ICD-10-CM

## 2023-10-06 PROCEDURE — 11043 DBRDMT MUSC&/FSCA 1ST 20/<: CPT | Performed by: NURSE PRACTITIONER

## 2023-10-06 PROCEDURE — 11043 DBRDMT MUSC&/FSCA 1ST 20/<: CPT

## 2023-10-06 PROCEDURE — 11042 DBRDMT SUBQ TIS 1ST 20SQCM/<: CPT

## 2023-10-06 PROCEDURE — 11042 DBRDMT SUBQ TIS 1ST 20SQCM/<: CPT | Mod: 59 | Performed by: NURSE PRACTITIONER

## 2023-10-06 NOTE — PATIENT INSTRUCTIONS
-Keep your wound dressing clean, dry, and intact.    -Change your dressing if it becomes soiled, soaked, or falls off.    -Should you experience any significant changes in your wound(s), such as infection (redness, swelling, localized heat, increased pain, fever > 101 F, chills) or have any questions regarding your home care instructions, please contact the wound center at [phone]. If after hours, contact your primary care physician or go to the hospital emergency room.

## 2023-10-07 NOTE — PROGRESS NOTES
Home Health Wound Care orders entered into Epic and routed to [facility] via [employer] at [phone].

## 2023-10-07 NOTE — PROGRESS NOTES
Provider Encounter- Pressure Injury        HISTORY OF PRESENT ILLNESS  Wound History:    START OF CARE IN CLINIC: 6/23/2023 (return to clinic after hospitalization)    REFERRING PROVIDER: Sahara Mendez      WOUNDS- Pressure Injury, Sacrococcygeal, stage IV                                                             Surgical wound dehiscence, left medial lower leg-status post surgical I&D of stage IV pressure injury and           biologic application                    Pressure injury, DTI, left posterior lower leg-first observed in clinic 7/29/2022       Pressure injury stage III L heel - first noted 10/21     Right 2nd toe avulsion - first noted 10/21     Skin tag left posterior ear - first noted 10/21     HISTORY: Patient with history of incomplete quadriplegia referred to [facility] for treatment of a stage IV pressure injury.  He has a history of previous pressure injuries to this area, and underwent muscle flaps in 2019, and then again in 2020.  He was seen in the wound clinic in November 2021 for an ulcer proximal from his current ulcer, and pressure injuries to his left posterior lower leg and left heel.  At that time, it was discovered that the patient had retained VAC foam embedded in the wound bed of the sacral wound.  Attempts were made to get him back to his plastic surgeon, though unsuccessful.  In January he underwent surgical removal of VAC sponge along with excisional debridement of his sacral wound by Dr. Chaves.  After the surgery, his wound went on to heal without incident.   In early April 2022, his home health nurse noted a new sacral ulcer, below the previous ulcer which quickly tripled in size over the following weeks.  The ulcer to his left medial lower leg had also deteriorated, with bone visible at the base..  He was hospitalized from 4/22 until 4/27/2022 and underwent surgery with Dr. Raman on 4/26 for irrigation and debridement of multiple compartments of the left lower extremity, bone  excision, and complex closure of chronic wound using biologic skin substitute.   His sacrococcygeal wound was not surgically addressed during this admission.  He was discharged back to his group home, with home health, and referral to outpatient wound clinic for his sacral wound.  He was instructed to follow-up with his surgeon for his lower leg wound.       Postoperatively, the left medial lower leg incision dehisced.  He was seen by his surgeon at [facility] on 5/11.  The surgeon opted to leave remaining sutures in place, and refer him to the wound clinic for treatment of this wound.   Treatment of this wound was initiated in clinic on 5/12.  During this visit was also noted that his heel DTI had resolved, but that he had a new pressure injury to his left posterior lower extremity.     A new pressure injury was noted to patient's right upper buttock/lower back on 5/20/2022.  Wound was linear in shape, skin discolored but intact.     Abrasion noted to left anterior lower leg.  First observed in clinic on 7/22/2022.  Patient states he bumped his leg into his food tray.     Small DTI noted to patient's left lateral lower leg on 7/29/2022.  Skin intact but discolored.     Large area of deep tissue injury noted to patient's left exterior lower leg.  Patient denied any trauma to this area.  Skin intact.  Wound documented.    1/27/2023: Patient was admitted to [facility] from 1/23-1/25/2023 with gross hematuria. He underwent RICHARD which showed watchman device was in place and he was taken off of Xarelto. While hospitalized wound team was consulted. He was referred back to [facility] and home health upon discharge.    Patient was hospitalized at [facility] for pyelonephritis from 2/26 until 3/2/2023, admitted for fever and general malaise.  He was admitted and initially started on linezolid and meropenem for suspected UTI and history of multidrug-resistant organisms.  Urine cultures were negative. ID was consulted, recommended CT of chest and  abdomen,which were negative for acute findings. However, he was treated with 5 days total course of antibiotics for suspected UTI, and symptoms completely resolved.  During this admission, the inpatient wound team was consulted for treatment of his sacral and lower leg wounds.  A wound culture was taken from his left heel pressure ulcer, negative.  Once stabilized, he was discharged home and referred back to [facility] to resume treatment of his wounds.    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  Never used smokeless tobacco    Patient's problem list, allergies, and current medications reviewed and updated in Epic    Interval History:  Interval History thinned 7/29/2022.  Please see previous notes for complete interval history.     Interval History thinned 1/27/2023. Please see previous note for complete interval history.    Interval History thinned 3/3/2023.  Please see previous notes for complete interval history.      Interval History thinned 8/4/2023.  Please see previous notes for complete interval history.      7/28/2023: Clinic visit with Steve Hodges MD. Patient reports being in normal state of health. Denies any current issues. His lower wounds are largely unchanged. Sacrum continues to enlarge, he reports that he has been up in chair more frequently this week. Patient counseled on risks of enlarging pressure injury including risk that wound may progress, known OM may worsen or expand including causing illness. He is aware of risks and possible other treatment options, he would like to proceed with current treatment.    8/4/2023 : Clinic visit with ADILSON Arthur, FNP-BC,  LILIYA VALERIO.  Anjum states he is feeling well overall.  Left medial lower leg wound has decreased in area significantly.  However, a new area has opened just below sacral ulcer,  from an ulcer by thin skin bridge.  Patient denies any changes to his usual activity.  His left heel wound remains healed, however he does have some slightly denuded skin to the heel.  He was seen by his podiatrist earlier this week, dressing was applied, possibly causing slight maceration.    8/11/2023 : Clinic visit with ADILSON Arthur FNP-BC, CWOCN, CFCN.   Anjum continues to feel well.  Unfortunately, left plantar foot lateral foot wound has reopened slightly.  The skin to his heel is slightly macerated, home health is using smaller heel foam dressing .  Sacrum measures about the same.  There is some friable red tissue along superior wound edge that was treated with AgNO3 today.    8/18/2023 : Clinic visit with ADILSON Arthur FNP-BC, CWOCN, CFCN.   Anjum states he is feeling well.  The plantar foot wound has deteriorated somewhat, area is increased.  Sacral wound is about the same.  Left lower leg wounds are both smaller, though with friable tissue.   Anjum's wounds have been essentially stable for a long time.  We discussed decreasing frequency of clinic visits to every 2 weeks.  Patient is agreeable to this plan.  He understands that we can resume weekly appointments if his wounds deteriorate.    9/1/2023 : Clinic visit with ADILSON Arthur FNP-BC, CWOCN, CFCN.   Anjum states he is in his usual state of health.  Unfortunately, his left heel wound has reopened, and his plantar foot wound has deteriorated.  The medial leg wound has again nearly closed, though tissue is boggy and will likely reopen.  His sacral wound continues to progress, though very slowly.    9/13/2023: Clinic visit with Steve Hodges MD. Patient reports feeling well. Left plantar lateral foot wound is larger with increased drainage, wound tract  that probes towards dorsal foot with some erythema and edema. Concerning for infection. No fluctuance. Patient has been on Augmentin, culture was cancelled previously as he was taking Abx before culture could be obtained. Due to worsening wound today, will add doxycycline to cover MRSA and obtain culture today, though may be sterile as has been on Abx. Sacral pressure injury with breakdown of previous wound site and larger. Patient is not overly concerned and does not want any surgical intervention.    9/18/2023: Clinic visit with Steve Hodges MD. Patient reports feeling in normal state of health. He denies any signs or symptoms of infection currently. Patient left foot wound appears improved. Patient remains on Augmentin. Wound culture was concerning for ESBL Proteus, will discuss case with ID as there are no simple oral options for coverage    9/29/2023 : Clinic visit with ADILSON Arthur, HOLDEN, IMAN, LILIYA.   Anjum states he is feeling okay.  His plantar foot wound is again progressing, area has decreased.  The leg wound is nearly resolved, though poor tissue quality as previously noted.  He is left heel wound and left posterior leg wounds are also again resolved.  Unfortunately, his sacrococcygeal wound is larger, now communicates with superior wound.    10/6/2023 : Clinic visit with ADILSON Arthur, HOLDEN, IMAN, LILIYA.   Anjum continues to feel well.  His left lower extremity wounds  are unchanged from last visit, left foot wound continues to progress.  Superior sacral wound measures slightly larger, inferior wound slightly smaller.  Patient is still not interested in VAC to heal these wounds.      REVIEW OF SYSTEMS:   Unchanged from previous wound clinic assessment on 9/29/2023, except as noted in interval history above     PHYSICAL EXAMINATION:   There were no vitals taken for this visit.  Physical Exam  Constitutional:       Appearance: He is obese.   Cardiovascular:      Rate and Rhythm: Normal  rate.   Pulmonary:      Effort: Pulmonary effort is normal.   Abdominal:      Comments: Colostomy left lower quadrant   Genitourinary:     Comments: Suprapubic catheter to down drain   Skin:     Comments: Stage IV coccygeal pressure injury -total wound area has increased slightly since last assessment, superior and inferior wounds are communicating under skin bridge, moderate serosanguineous drainage, slough to wound bed, depth close to bone, no evidence of infection.  Moderate serosanguineous drainage      Full-thickness wound to left medial lower leg -no significant change from last visit, nearly resolved with poor tissue quality, scant serous drainage.    Stage III pressure injury left posterior heel -remains resolved, though epithelium is fragile    Stage IV pressure injury plantar foot -wound area and depth have again decreased, moderate serosanguineous drainage, slough to wound bed, no evidence of infection    Stage III pressure injury to posterior left lower leg, recurring-skin intact but discolored, resolved but at high risk for recurrence.     Neurological:      Mental Status: He is alert and oriented to person, place, and time.   Psychiatric:         Mood and Affect: Mood normal.         WOUND ASSESSMENT  Wound 06/18/23 Pressure Injury Coccyx Stage IV (Active)   Wound Image    10/06/23 1500   Site Assessment Pink;Red;Undermining;Tunneling 10/06/23 1500   Periwound Assessment Blanchable erythema;Scar tissue 10/06/23 1500   Margins Epibole (rolled edges);Unattached edges 10/06/23 1500   Drainage Amount Large 10/06/23 1500   Drainage Description Serosanguineous 10/06/23 1500   Treatments Cleansed;Provider debridement;Site care 10/06/23 1500   Wound Cleansing Normal Saline Irrigation 10/06/23 1500   Periwound Protectant Skin Protectant Wipes to Periwound;Barrier Paste 10/06/23 1500   Dressing Changed Changed 10/06/23 1500   Dressing Cleansing/Solutions Not Applicable 10/06/23 1500   Dressing Options Hydrofiber  Silver Strip;Hydrofiber Silver;Offloading Dressing - Sacral 10/06/23 1500   Dressing Change/Treatment Frequency Every 72 hrs, and As Needed 10/06/23 1500   Wound Team Following Weekly 10/06/23 1500   WOUND NURSE ONLY - Pressure Injury Stage 4 10/06/23 1500   Wound Length (cm) 4 cm 10/06/23 1500   Wound Width (cm) 1.2 cm 10/06/23 1500   Wound Depth (cm) 0.7 cm 10/06/23 1500   Wound Surface Area (cm^2) 4.8 cm^2 10/06/23 1500   Wound Volume (cm^3) 3.36 cm^3 10/06/23 1500   Post-Procedure Length (cm) 4.1 cm 10/06/23 1500   Post-Procedure Width (cm) 1.3 cm 10/06/23 1500   Post-Procedure Depth (cm) 0.8 cm 10/06/23 1500   Post-Procedure Surface Area (cm^2) 5.33 cm^2 10/06/23 1500   Post-Procedure Volume (cm^3) 4.264 cm^3 10/06/23 1500   Wound Healing % 8 10/06/23 1500   Tunneling (cm) 0 cm 09/18/23 1200   Tunneling Clock Position of Wound 12 09/29/23 1600   Undermining (cm) 1.1 cm 10/06/23 1500   Undermining of Wound, 1st Location From 6 o'clock;To 7 o'clock 10/06/23 1500   Undermining (cm) - 2nd location 3.1 cm 09/18/23 1200   Undermining of Wound, 2nd Location From 11 o'clock;From 1 o'clock 09/18/23 1200   Wound Odor None 10/06/23 1500   Exposed Structures KEN 10/06/23 1500   Number of days: 110       Wound 06/18/23 Full Thickness Wound Leg Medial Left (Active)   Wound Image   10/06/23 1500   Site Assessment Boggy;Red;Fragile 10/06/23 1500   Periwound Assessment Purple;Blanchable erythema 10/06/23 1500   Margins Attached edges 10/06/23 1500   Drainage Amount Small 10/06/23 1500   Drainage Description Serosanguineous 10/06/23 1500   Treatments Cleansed;Site care 10/06/23 1500   Wound Cleansing Normal Saline Irrigation 10/06/23 1500   Periwound Protectant Skin Protectant Wipes to Periwound;Barrier Paste 10/06/23 1500   Dressing Changed Changed 10/06/23 1500   Dressing Cleansing/Solutions Not Applicable 10/06/23 1500   Dressing Options Hydrofiber Silver;Silicone Adhesive Foam 10/06/23 1500   Dressing Change/Treatment  Frequency Every 72 hrs, and As Needed 10/06/23 1500   Wound Team Following Weekly 10/06/23 1500   Non-staged Wound Description Full thickness 10/06/23 1500   Wound Length (cm) 1 cm 10/06/23 1500   Wound Width (cm) 1 cm 10/06/23 1500   Wound Depth (cm) 0.1 cm 10/06/23 1500   Wound Surface Area (cm^2) 1 cm^2 10/06/23 1500   Wound Volume (cm^3) 0.1 cm^3 10/06/23 1500   Post-Procedure Length (cm) 2 cm 09/18/23 1200   Post-Procedure Width (cm) 2.5 cm 09/18/23 1200   Post-Procedure Depth (cm) 0.2 cm 09/18/23 1200   Post-Procedure Surface Area (cm^2) 5 cm^2 09/18/23 1200   Post-Procedure Volume (cm^3) 1 cm^3 09/18/23 1200   Wound Healing % 67 10/06/23 1500   Tunneling (cm) 0 cm 10/06/23 1500   Undermining (cm) 0 cm 10/06/23 1500   Undermining of Wound, 1st Location From 1 o'clock;To 2 o'clock 06/02/23 1600   Undermining (cm) - 2nd location 1.3 cm 06/02/23 1600   Undermining of Wound, 2nd Location From 3 o'clock;To 4 o'clock 06/02/23 1600   Wound Odor None 10/06/23 1500   Exposed Structures None 10/06/23 1500   Number of days: 110       Wound 08/11/23 Full Thickness Wound Foot Lateral Left (Active)   Wound Image    10/06/23 1500   Site Assessment Red;Bleeding 10/06/23 1500   Periwound Assessment Blanchable erythema;Edema 10/06/23 1500   Margins Unattached edges 10/06/23 1500   Drainage Amount Moderate 10/06/23 1500   Drainage Description Serosanguineous 10/06/23 1500   Treatments Cleansed;Provider debridement;Site care 10/06/23 1500   Wound Cleansing Normal Saline Irrigation 10/06/23 1500   Periwound Protectant Skin Protectant Wipes to Periwound;Barrier Paste 10/06/23 1500   Dressing Status Clean;Intact 08/11/23 1500   Dressing Changed Changed 10/06/23 1500   Dressing Cleansing/Solutions Not Applicable 10/06/23 1500   Dressing Options Hydrofiber Silver;Silicone Adhesive Foam;Tubigrip 10/06/23 1500   Dressing Change/Treatment Frequency Every 72 hrs, and As Needed 10/06/23 1500   Wound Team Following Weekly 10/06/23 1500    Non-staged Wound Description Full thickness 10/06/23 1500   Wound Length (cm) 0.7 cm 10/06/23 1500   Wound Width (cm) 1 cm 10/06/23 1500   Wound Depth (cm) 0.3 cm 10/06/23 1500   Wound Surface Area (cm^2) 0.7 cm^2 10/06/23 1500   Wound Volume (cm^3) 0.21 cm^3 10/06/23 1500   Post-Procedure Length (cm) 0.8 cm 10/06/23 1500   Post-Procedure Width (cm) 1.1 cm 10/06/23 1500   Post-Procedure Depth (cm) 0.4 cm 10/06/23 1500   Post-Procedure Surface Area (cm^2) 0.88 cm^2 10/06/23 1500   Post-Procedure Volume (cm^3) 0.352 cm^3 10/06/23 1500   Wound Healing % -775 10/06/23 1500   Tunneling (cm) 0.4 cm 10/06/23 1500   Tunneling Clock Position of Wound 5 10/06/23 1500   Undermining (cm) 0 cm 10/06/23 1500   Wound Odor None 10/06/23 1500   Exposed Structures None 10/06/23 1500   Number of days: 56       Wound 09/13/23 Pressure Injury Coccyx Superior (Active)   Wound Image    10/06/23 1500   Site Assessment Red;Tunneling 10/06/23 1500   Periwound Assessment Maceration 10/06/23 1500   Margins Unattached edges;Epibole (rolled edges) 09/29/23 1600   Drainage Amount Large 10/06/23 1500   Drainage Description Serosanguineous 10/06/23 1500   Treatments Cleansed;Provider debridement;Site care 10/06/23 1500   Wound Cleansing Normal Saline Irrigation 10/06/23 1500   Periwound Protectant Skin Protectant Wipes to Periwound;Barrier Paste 10/06/23 1500   Dressing Status Old drainage 10/06/23 1500   Dressing Changed Changed 10/06/23 1500   Dressing Cleansing/Solutions Not Applicable 10/06/23 1500   Dressing Options Hydrofiber Silver Strip;Hydrofiber Silver;Offloading Dressing - Sacral 10/06/23 1500   Dressing Change/Treatment Frequency Every 72 hrs, and As Needed 10/06/23 1500   Wound Team Following Weekly 10/06/23 1500   WOUND NURSE ONLY - Pressure Injury Stage 4 10/06/23 1500   Non-staged Wound Description Full thickness 09/29/23 1600   Wound Length (cm) 1 cm 10/06/23 1500   Wound Width (cm) 3 cm 10/06/23 1500   Wound Depth (cm) 1.7 cm  10/06/23 1500   Wound Surface Area (cm^2) 3 cm^2 10/06/23 1500   Wound Volume (cm^3) 5.1 cm^3 10/06/23 1500   Post-Procedure Length (cm) 1.1 cm 10/06/23 1500   Post-Procedure Width (cm) 3.1 cm 10/06/23 1500   Post-Procedure Depth (cm) 1.8 cm 10/06/23 1500   Post-Procedure Surface Area (cm^2) 3.41 cm^2 10/06/23 1500   Post-Procedure Volume (cm^3) 6.138 cm^3 10/06/23 1500   Wound Healing % -844 10/06/23 1500   Tunneling (cm) 3.2 cm 09/18/23 1200   Tunneling Clock Position of Wound 5 10/06/23 1500   Undermining (cm) 0 cm 10/06/23 1500   Wound Odor None 10/06/23 1500   Exposed Structures None 10/06/23 1500   Number of days: 23       PROCEDURE: Excisional debridement of sacral / coccygeal pressure injury-wounds communicate via tunnel  -2% viscous lidocaine applied topically to wound beds for approximately 5 minutes prior to debridement  -Curette used to debride wound bed.  Excisional debridement was performed to remove devitalized tissue until healthy, bleeding tissue was visualized.  Total wound area debrided approximately 19 cm² including into the wound tunnel.  Tissue debrided into the muscle / fascia layer.    -Bleeding controlled with manual pressure.    -Wound care completed by wound RN, refer to flowsheet  -Patient tolerated the procedure well, without c/o pain or discomfort.      PROCEDURE: Excisional debridement of left plantar/lateral foot ulcer  -2% viscous lidocaine applied topically to wound bed for approximately 5 minutes prior to debridement  -Curette used to debride wound bed.  Excisional debridement was performed to remove devitalized tissue until healthy, bleeding tissue was visualized.   Entire surface of wound, 0.88 cm² debrided.  Tissue debrided into the subcutaneous layer.    -Bleeding controlled with manual pressure.    -Wound care completed by wound RN, refer to flowsheet  -Patient tolerated the procedure well, without c/o pain or discomfort.      No debridement of left heel, left medial lower  "leg, or left posterior leg wounds required today.    BIOLOGIC LOG  5/20/2022-first application.  PRODUCT: EpiCord 2 x 3 cm . PRODUCT #[id number]; EXPIRES: 2026-12-01 5/27/2022- second application.  PRODUCT: EpiCordEX 2 x 3 cm . PRODUCT #[id number]; EXPIRES: 2026-09-01    6/3/2022- third application.  PRODUCT: EpiCordEX 2 x 3 cm . PRODUCT #[id number]; EXPIRES: 2026-10-01    6/10/2022- fourth application.  PRODUCT: EpiCordEX 2 x 3 cm . PRODUCT #[id number]; EXPIRES: 2026-09-01 6/17/2022- fifth application.  PRODUCT: EpiCordEX 2 x 3 cm . PRODUCT #[id number]; EXPIRES: 2027-02-01 6/24/2022- sixth application.  PRODUCT: EpiCordEX 2 x 3 cm . PRODUCT #[id number]; EXPIRES: 2027-02-01 07/01/22: Seventh application.  Product: Epicort Dx 2.0 x 3.0 cm reorder number [id number]; product number [id number]; expires 2027-02-01 7/22/2022- eighth application.  PRODUCT: EpiCord 2 x 3 cm, reorder #[id number]. PRODUCT #[id number]; EXPIRES: 2027-02-01      Pertinent Labs and Diagnostics:    Labs:     A1c: No results found for: \"HBA1C\"     [facility] results, 7/1/2022 (under media tab)    CRP 13    ESR 31      IMAGING:     10/3/2022-CT of pelvis with contrast  IMPRESSION:     1.  Posterior soft tissue sacral wound and 1.5 x 3.7 cm abscess.   2.  Progressive destruction of the distal sacrum and proximal coccyx. Sclerosis and irregularity of the distal sacrum consistent with osteomyelitis.   3.  Old wound within the soft tissues posterior to the distal left SI joint with possible fistulous communication.    10/3/2022-CT of tib-fib with and without contrast, with reconstruction  IMPRESSION:     1.  Old fractures of the left tibia and fibula with extensive callus formation and bony bridging as well as extensive dystrophic calcifications. Similar to previous.   2.  Distal medial soft tissue wounds with ill-defined superficial in the soft tissue thickening and fluid collections " suggestive of phlegmon. No organized abscess identified.   3.  No definite osteomyelitis.   4.  Arthritic changes.        VASCULAR STUDIES: No results found.    LAST  WOUND CULTURE:   Lab Results   Component Value Date/Time    CULTRSULT (A) 09/13/2023 04:10 PM     Growth noted after further incubation, see below for  organism identification.  Light growth usual skin ade.      CULTRSULT (A) 09/13/2023 04:10 PM     Proteus mirabilis ESBL  Light growth  Extended Spectrum Beta-lactamase (ESBL) isolated.  ESBL's may be clinically resistant to therapy with  Penicillins,Cephalosporins or Aztreonam despite  apparent in vitro susceptibility to some of these agents.  The patient requires contact isolation.  Please contact pharmacy or an Infectious Disease Specialist  if you have any questions about appropriate therapy.      CULTRSULT Klebsiella pneumoniae  Light growth   (A) 09/13/2023 04:10 PM    CULTRSULT (A) 09/13/2023 04:10 PM     Methicillin Resistant Staphylococcus aureus  Light growth        PATHOLOGY  2/17/2023-bone fragment extracted from left lower extremity wound\\  FINAL DIAGNOSIS:     A. Left leg bone fragment at base of chronic wound:          Extensively degenerated fibrocartilaginous tissue with a rim of           fibrinopurulent debris          Correlate with culture findings            Comment: While no residual intact bone is identified, these           findings are suggestive of adjacent osteomyelitis.      ASSESSMENT AND PLAN:     1. Sacral decubitus ulcer, stage IV ([facility])  Comments: Ulcer first noted in early April 2022 as small open area which quickly enlarged.  This ulcer is present distal from previous sacral ulcer which healed after surgery in January.  Patient has history of flap reconstruction x2 to this area.  CT shows progressive destruction of distal sacrum and proximal coccyx.    10/6/2023: Wound areas decreased slightly since last assessment.  Inferior and superior wounds communicate under  skin bridge.  - Excisional debridement was performed in clinic, medically necessary to promote wound healing.   -Patient to return to clinic every 2 weeks.  He understands that we can increase frequency of clinic visits if wounds deteriorate.  -Home health to change dressing 2 times per week on the week he comes into the clinic, 3 times on the opposite week.  -Patient does spend a lot of time up in his wheelchair, and wishes to continue to do so for his quality of life.  He lives independently, drives, and is involved with family activities.  He does have an appropriate pressure-relief cushion and is very well versed on pressure relieving techniques.  - Patient does not currently have follow up with Dr. Saini. If wound deteriorates or fails to improve can consider re-establishing with Dr. Saini for evaluation for coverage options. Patient does not want to pursue at this time and is happy with current conservative approach even with wound worsening.  - Known OM that has already been treated. No utility of Abx unless gross soft tissue infection which does not appear to be present today.    Wound care: Collagen, Hydrofiber silver strip, hydrofiber silver, Mepilex    2. Postoperative wound dehiscence, subsequent encounter  3. Osteomyelitis of left lower extremity  Comments: On 4/26/2022 patient underwent irrigation and debridement of multiple compartments of the left lower extremity states for pressure injury, with bone excision, and complex closure of chronic wound using biologic skin substitute.  During postop visit in surgeons office on 5/11, surgical site was noted to be dehisced.  Patient was referred to wound clinic for management.    10/6/2023: No significant change from last visit.  Wound is still covered with friable tissue, no evidence of infection  -No excisional debridement of this wound today  -Patient to return to clinic every 2 weeks for assessment and debridement if indicated  -Home health to change  dressing 1-2 times per week in between clinic visits OM.  -Suspect underlying OM, however patient does not wish to consider surgical options at this time nor does his surgeon wish to do any further surgery to this leg. 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 There is no gross soft tissue infection and repeated Abx treatment without source control is not indicated.    -We will assess these wounds each clinic visit  -Patient to be mindful of offloading when supine, should assure that his leg is not rotating medially  Wound care: Hydrofiber silver, silicone adhesive foam, tubigrip    3. Deep tissue injury  4. Pressure injury of left foot, stage IV  Comments: DTI to posterior left lower leg first observed in clinic 7/29/2022.  Patient does not know how it started, had been wearing heel float boot consistently.  Evolved into stage IV pressure injury    10/6/2023: Skin remains intact over site of posterior left lower leg DTI, tends to wax and wane.  Area and depth of plantar/lateral foot ulcer has decreased since last assessment  -Excisional debridement of foot ulcer in clinic today, medically necessary to promote wound healing  -Patient is well versed on offloading techniques  - Patient to be mindful of offloading his foot and posterior leg       Wound care: Foot wound-  Puracyn Gel, Silver Hydrofiber to manage exudate and bioburden, foam cover dressing, Tubigrip D to manage edema    5. Pressure injury of left heel, stage 3 ([facility])  Comments: First noted in clinic on 10/21/2022, originally noted to be stage II    10/6/2023: Ulcer remains resolved, though high risk for recurrence, covered with fragile epithelium  -We will continue to monitor wound each visit, initiate treatment as indicated  - Patient continue wearing heel float boots at all times  - Heel does not appear to contact any solid surfaces while in wheelchair   Wound Care: Heel Mepilex to reduce pressure and friction    6.  Quadriplegia, C5-C7, incomplete ([facility])  Comments: Complicating factor.  Impaired mobility and sensation  -Patient is still spending 7-8 hours/day up in his wheelchair, though he does have appropriate offloading cushion, and knows to reposition frequently.  Wears heel float boots bilaterally at all times      Please note that this note may have been created using voice recognition software. I have worked with technical experts from [facility] to optimize the interface.  I have made every reasonable attempt to correct obvious errors, but there may be errors of grammar and possibly content that I did not discover before finalizing the note.

## 2023-10-13 ENCOUNTER — OFFICE VISIT (OUTPATIENT)
Dept: WOUND CARE | Facility: MEDICAL CENTER | Age: 73
End: 2023-10-13
Attending: INTERNAL MEDICINE
Payer: MEDICARE

## 2023-10-13 VITALS
OXYGEN SATURATION: 99 % | DIASTOLIC BLOOD PRESSURE: 50 MMHG | HEART RATE: 59 BPM | SYSTOLIC BLOOD PRESSURE: 93 MMHG | TEMPERATURE: 97 F | RESPIRATION RATE: 18 BRPM

## 2023-10-13 DIAGNOSIS — L89.154 SACRAL DECUBITUS ULCER, STAGE IV (HCC): ICD-10-CM

## 2023-10-13 DIAGNOSIS — M86.9 OSTEOMYELITIS OF LEFT LOWER EXTREMITY (HCC): ICD-10-CM

## 2023-10-13 DIAGNOSIS — L89.894 PRESSURE INJURY OF LEFT FOOT, STAGE 4 (HCC): ICD-10-CM

## 2023-10-13 DIAGNOSIS — T81.31XD POSTOPERATIVE WOUND DEHISCENCE, SUBSEQUENT ENCOUNTER: ICD-10-CM

## 2023-10-13 DIAGNOSIS — G82.54 QUADRIPLEGIA, C5-C7 INCOMPLETE (HCC): Primary | ICD-10-CM

## 2023-10-13 PROCEDURE — 11042 DBRDMT SUBQ TIS 1ST 20SQCM/<: CPT | Mod: 59 | Performed by: NURSE PRACTITIONER

## 2023-10-13 PROCEDURE — 11043 DBRDMT MUSC&/FSCA 1ST 20/<: CPT | Performed by: NURSE PRACTITIONER

## 2023-10-13 PROCEDURE — 11042 DBRDMT SUBQ TIS 1ST 20SQCM/<: CPT

## 2023-10-13 PROCEDURE — 3074F SYST BP LT 130 MM HG: CPT | Performed by: NURSE PRACTITIONER

## 2023-10-13 PROCEDURE — 3078F DIAST BP <80 MM HG: CPT | Performed by: NURSE PRACTITIONER

## 2023-10-13 PROCEDURE — 11043 DBRDMT MUSC&/FSCA 1ST 20/<: CPT

## 2023-10-13 NOTE — PROGRESS NOTES
Provider Encounter- Pressure Injury        HISTORY OF PRESENT ILLNESS  Wound History:    START OF CARE IN CLINIC: 6/23/2023 (return to clinic after hospitalization)    REFERRING PROVIDER: Sahara Mendez      WOUNDS- Pressure Injury, Sacrococcygeal, stage IV                                                             Surgical wound dehiscence, left medial lower leg-status post surgical I&D of stage IV pressure injury and           biologic application                    Pressure injury, DTI, left posterior lower leg-first observed in clinic 7/29/2022       Pressure injury stage III L heel - first noted 10/21     Right 2nd toe avulsion - first noted 10/21     Skin tag left posterior ear - first noted 10/21     HISTORY: Patient with history of incomplete quadriplegia referred to [facility] for treatment of a stage IV pressure injury.  He has a history of previous pressure injuries to this area, and underwent muscle flaps in 2019, and then again in 2020.  He was seen in the wound clinic in November 2021 for an ulcer proximal from his current ulcer, and pressure injuries to his left posterior lower leg and left heel.  At that time, it was discovered that the patient had retained VAC foam embedded in the wound bed of the sacral wound.  Attempts were made to get him back to his plastic surgeon, though unsuccessful.  In January he underwent surgical removal of VAC sponge along with excisional debridement of his sacral wound by Dr. Chaves.  After the surgery, his wound went on to heal without incident.   In early April 2022, his home health nurse noted a new sacral ulcer, below the previous ulcer which quickly tripled in size over the following weeks.  The ulcer to his left medial lower leg had also deteriorated, with bone visible at the base..  He was hospitalized from 4/22 until 4/27/2022 and underwent surgery with Dr. Raman on 4/26 for irrigation and debridement of multiple compartments of the left lower extremity, bone  excision, and complex closure of chronic wound using biologic skin substitute.   His sacrococcygeal wound was not surgically addressed during this admission.  He was discharged back to his group home, with home health, and referral to outpatient wound clinic for his sacral wound.  He was instructed to follow-up with his surgeon for his lower leg wound.       Postoperatively, the left medial lower leg incision dehisced.  He was seen by his surgeon at [facility] on 5/11.  The surgeon opted to leave remaining sutures in place, and refer him to the wound clinic for treatment of this wound.   Treatment of this wound was initiated in clinic on 5/12.  During this visit was also noted that his heel DTI had resolved, but that he had a new pressure injury to his left posterior lower extremity.     A new pressure injury was noted to patient's right upper buttock/lower back on 5/20/2022.  Wound was linear in shape, skin discolored but intact.     Abrasion noted to left anterior lower leg.  First observed in clinic on 7/22/2022.  Patient states he bumped his leg into his food tray.     Small DTI noted to patient's left lateral lower leg on 7/29/2022.  Skin intact but discolored.     Large area of deep tissue injury noted to patient's left exterior lower leg.  Patient denied any trauma to this area.  Skin intact.  Wound documented.    1/27/2023: Patient was admitted to [facility] [place] 1/23-1/25/2023 with gross hematuria. He underwent RICHARD which showed watchman device was in place and he was taken off of Xarelto. While hospitalized wound team was consulted. He was referred back to [facility] and home health upon discharge.    Patient was hospitalized at [facility] for pyelonephritis from 2/26 until 3/2/2023, admitted for fever and general malaise.  He was admitted and initially started on linezolid and meropenem for suspected UTI and history of multidrug-resistant organisms.  Urine cultures were negative. ID was consulted, recommended CT of chest and  abdomen,which were negative for acute findings. However, he was treated with 5 days total course of antibiotics for suspected UTI, and symptoms completely resolved.  During this admission, the inpatient wound team was consulted for treatment of his sacral and lower leg wounds.  A wound culture was taken from his left heel pressure ulcer, negative.  Once stabilized, he was discharged home and referred back to [facility] to resume treatment of his wounds.    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  Never used smokeless tobacco    Patient's problem list, allergies, and current medications reviewed and updated in Epic    Interval History:  Interval History thinned 7/29/2022.  Please see previous notes for complete interval history.     Interval History thinned 1/27/2023. Please see previous note for complete interval history.    Interval History thinned 3/3/2023.  Please see previous notes for complete interval history.      Interval History thinned 8/4/2023.  Please see previous notes for complete interval history.      7/28/2023: Clinic visit with Steve Hodges MD. Patient reports being in normal state of health. Denies any current issues. His lower wounds are largely unchanged. Sacrum continues to enlarge, he reports that he has been up in chair more frequently this week. Patient counseled on risks of enlarging pressure injury including risk that wound may progress, known OM may worsen or expand including causing illness. He is aware of risks and possible other treatment options, he would like to proceed with current treatment.    8/4/2023 : Clinic visit with ADILSON Arthur, FNP-BC,  LILIYA VALERIO.  Anjum states he is feeling well overall.  Left medial lower leg wound has decreased in area significantly.  However, a new area has opened just below sacral ulcer,  from an ulcer by thin skin bridge.  Patient denies any changes to his usual activity.  His left heel wound remains healed, however he does have some slightly denuded skin to the heel.  He was seen by his podiatrist earlier this week, dressing was applied, possibly causing slight maceration.    8/11/2023 : Clinic visit with ADILSON Arthur FNP-BC, CWOCN, CFCN.   Anjum continues to feel well.  Unfortunately, left plantar foot lateral foot wound has reopened slightly.  The skin to his heel is slightly macerated, home health is using smaller heel foam dressing .  Sacrum measures about the same.  There is some friable red tissue along superior wound edge that was treated with AgNO3 today.    8/18/2023 : Clinic visit with ADILSON rAthur FNP-BC, CWOCN, CFCN.   Anjum states he is feeling well.  The plantar foot wound has deteriorated somewhat, area is increased.  Sacral wound is about the same.  Left lower leg wounds are both smaller, though with friable tissue.   Anjum's wounds have been essentially stable for a long time.  We discussed decreasing frequency of clinic visits to every 2 weeks.  Patient is agreeable to this plan.  He understands that we can resume weekly appointments if his wounds deteriorate.    9/1/2023 : Clinic visit with ADILSON Arthur FNP-BC, CWOCN, CFCN.   Anjum states he is in his usual state of health.  Unfortunately, his left heel wound has reopened, and his plantar foot wound has deteriorated.  The medial leg wound has again nearly closed, though tissue is boggy and will likely reopen.  His sacral wound continues to progress, though very slowly.    9/13/2023: Clinic visit with Steve Hodges MD. Patient reports feeling well. Left plantar lateral foot wound is larger with increased drainage, wound tract  that probes towards dorsal foot with some erythema and edema. Concerning for infection. No fluctuance. Patient has been on Augmentin, culture was cancelled previously as he was taking Abx before culture could be obtained. Due to worsening wound today, will add doxycycline to cover MRSA and obtain culture today, though may be sterile as has been on Abx. Sacral pressure injury with breakdown of previous wound site and larger. Patient is not overly concerned and does not want any surgical intervention.    9/18/2023: Clinic visit with Steve Hodges MD. Patient reports feeling in normal state of health. He denies any signs or symptoms of infection currently. Patient left foot wound appears improved. Patient remains on Augmentin. Wound culture was concerning for ESBL Proteus, will discuss case with ID as there are no simple oral options for coverage    9/29/2023 : Clinic visit with ADILSON Arthur, HOLDEN, GEETHA VALERIO   Anjum states he is feeling okay.  His plantar foot wound is again progressing, area has decreased.  The leg wound is nearly resolved, though poor tissue quality as previously noted.  He is left heel wound and left posterior leg wounds are also again resolved.  Unfortunately, his sacrococcygeal wound is larger, now communicates with superior wound.    10/6/2023 : Clinic visit with ADILSON Arthur, HOLDEN, LILIYA VALERIO.   Anjum continues to feel well.  His left lower extremity wounds  are unchanged from last visit, left foot wound continues to progress.  Superior sacral wound measures slightly larger, inferior wound slightly smaller.  Patient is still not interested in VAC to heal these wounds.      10/13/2023 : Clinic visit with ADILSON Arthur FNP-BC, GEETHA VALERIO states he is feeling well.  His lower extremity wounds and foot wound are currently progressing well.  His sacral/coccyx wounds are not progressing.  Discussed restarting VAC.  However, would like to excise skin bridge between  the 2 wounds prior.  Patient is agreeable to this idea, however states he has a friend coming in from out of town who will be here for approximately 10 days.  Would like to postpone procedure and restarting of VAC for approximately 2 weeks.    REVIEW OF SYSTEMS:   Unchanged from previous wound clinic assessment on 10/6/2023, except as noted in interval history above     PHYSICAL EXAMINATION:   BP 93/50 (BP Location: Right arm, Patient Position: Sitting, BP Cuff Size: Adult)   Pulse (!) 59   Temp 36.1 °C (97 °F) (Temporal)   Resp 18   SpO2 99%   Physical Exam  Constitutional:       Appearance: He is obese.   Cardiovascular:      Rate and Rhythm: Normal rate.   Pulmonary:      Effort: Pulmonary effort is normal.   Abdominal:      Comments: Colostomy left lower quadrant   Genitourinary:     Comments: Suprapubic catheter to down drain   Skin:     Comments: Stage IV coccygeal pressure injury -total wound area about the same, wounds communicating with the skin bridge.  Depth close to bone, moderate serosanguineous drainage, no evidence of infection      Full-thickness wound to left medial lower leg -wound has not changed significantly since last assessment, poor tissue quality as previously noted, scant drainage    Stage III pressure injury left posterior heel -100% reepithelialized, however tissue is fragile    Stage IV pressure injury plantar foot -wound depth has decreased, tissue somewhat boggy, moderate serosanguineous drainage, no evidence of infection    Stage III pressure injury to posterior left lower leg, recurring-skin remains intact but discolored, fragile, high risk for recurrence     Neurological:      Mental Status: He is alert and oriented to person, place, and time.   Psychiatric:         Mood and Affect: Mood normal.         WOUND ASSESSMENT  Wound 06/18/23 Pressure Injury Coccyx Stage IV (Active)   Wound Image    10/13/23 1500   Site Assessment Red;Undermining;Tunneling 10/13/23 1500   Periwound  Assessment Scar tissue;Blanchable erythema 10/13/23 1500   Margins Epibole (rolled edges);Unattached edges 10/13/23 1500   Drainage Amount Large 10/13/23 1500   Drainage Description Serosanguineous 10/13/23 1500   Treatments Cleansed;Provider debridement 10/13/23 1500   Wound Cleansing Hypochlorus Acid 10/13/23 1500   Periwound Protectant Skin Protectant Wipes to Periwound;Barrier Paste 10/13/23 1500   Dressing Changed Changed 10/13/23 1500   Dressing Cleansing/Solutions Not Applicable 10/13/23 1500   Dressing Options Hydrofiber Silver Strip;Hydrofiber Silver;Offloading Dressing - Sacral 10/13/23 1500   Dressing Change/Treatment Frequency Every 72 hrs, and As Needed 10/13/23 1500   Wound Team Following Weekly 10/13/23 1500   WOUND NURSE ONLY - Pressure Injury Stage 4 10/13/23 1500   Wound Length (cm) 3.8 cm 10/13/23 1500   Wound Width (cm) 1.3 cm 10/13/23 1500   Wound Depth (cm) 0.6 cm 10/13/23 1500   Wound Surface Area (cm^2) 4.94 cm^2 10/13/23 1500   Wound Volume (cm^3) 2.964 cm^3 10/13/23 1500   Post-Procedure Length (cm) 3.9 cm 10/13/23 1500   Post-Procedure Width (cm) 1.3 cm 10/13/23 1500   Post-Procedure Depth (cm) 0.6 cm 10/13/23 1500   Post-Procedure Surface Area (cm^2) 5.07 cm^2 10/13/23 1500   Post-Procedure Volume (cm^3) 3.042 cm^3 10/13/23 1500   Wound Healing % 19 10/13/23 1500   Tunneling (cm) 0 cm 10/13/23 1500   Tunneling Clock Position of Wound 12 09/29/23 1600   Undermining (cm) 3 cm 10/13/23 1500   Undermining of Wound, 1st Location From 12 o'clock;To 2 o'clock 10/13/23 1500   Undermining (cm) - 2nd location 3.1 cm 09/18/23 1200   Undermining of Wound, 2nd Location From 11 o'clock;From 1 o'clock 09/18/23 1200   Wound Odor None 10/13/23 1500   Exposed Structures KEN 10/13/23 1500   Number of days: 117       Wound 06/18/23 Full Thickness Wound Leg Medial Left (Active)   Wound Image   10/13/23 1500   Site Assessment Red;Boggy;Fragile 10/13/23 1500   Periwound Assessment Purple;Blanchable erythema  10/13/23 1500   Margins Attached edges 10/13/23 1500   Drainage Amount Moderate 10/13/23 1500   Drainage Description Serosanguineous 10/13/23 1500   Treatments Cleansed;Site care 10/13/23 1500   Wound Cleansing Hypochlorus Acid 10/13/23 1500   Periwound Protectant Skin Protectant Wipes to Periwound;Barrier Paste 10/13/23 1500   Dressing Changed Changed 10/13/23 1500   Dressing Cleansing/Solutions Not Applicable 10/13/23 1500   Dressing Options Hydrofiber Silver;Silicone Adhesive Foam;Tubigrip 10/13/23 1500   Dressing Change/Treatment Frequency Every 72 hrs, and As Needed 10/13/23 1500   Wound Team Following Weekly 10/13/23 1500   Non-staged Wound Description Full thickness 10/13/23 1500   Wound Length (cm) 0.8 cm 10/13/23 1500   Wound Width (cm) 2.8 cm 10/13/23 1500   Wound Depth (cm) 0.1 cm 10/13/23 1500   Wound Surface Area (cm^2) 2.24 cm^2 10/13/23 1500   Wound Volume (cm^3) 0.224 cm^3 10/13/23 1500   Post-Procedure Length (cm) 2 cm 09/18/23 1200   Post-Procedure Width (cm) 2.5 cm 09/18/23 1200   Post-Procedure Depth (cm) 0.2 cm 09/18/23 1200   Post-Procedure Surface Area (cm^2) 5 cm^2 09/18/23 1200   Post-Procedure Volume (cm^3) 1 cm^3 09/18/23 1200   Wound Healing % 25 10/13/23 1500   Tunneling (cm) 0 cm 10/13/23 1500   Undermining (cm) 0 cm 10/13/23 1500   Undermining of Wound, 1st Location From 1 o'clock;To 2 o'clock 06/02/23 1600   Undermining (cm) - 2nd location 1.3 cm 06/02/23 1600   Undermining of Wound, 2nd Location From 3 o'clock;To 4 o'clock 06/02/23 1600   Wound Odor None 10/13/23 1500   Exposed Structures None 10/13/23 1500   Number of days: 117       Wound 08/11/23 Full Thickness Wound Foot Lateral Left (Active)   Wound Image    10/13/23 1500   Site Assessment Red;Bleeding;Boggy 10/13/23 1500   Periwound Assessment Denuded;Edema 10/13/23 1500   Margins Unattached edges;Attached edges 10/13/23 1500   Drainage Amount Moderate 10/13/23 1500   Drainage Description Serosanguineous 10/13/23 1500    Treatments Cleansed;Provider debridement 10/13/23 1500   Wound Cleansing Hypochlorus Acid 10/13/23 1500   Periwound Protectant Skin Protectant Wipes to Periwound;Barrier Paste 10/13/23 1500   Dressing Status Clean;Intact 08/11/23 1500   Dressing Changed Changed 10/13/23 1500   Dressing Cleansing/Solutions Not Applicable 10/13/23 1500   Dressing Options Hydrofiber Silver;Silicone Adhesive Foam;Tubigrip 10/13/23 1500   Dressing Change/Treatment Frequency Every 72 hrs, and As Needed 10/13/23 1500   Wound Team Following Weekly 10/13/23 1500   Non-staged Wound Description Full thickness 10/13/23 1500   Wound Length (cm) 0.7 cm 10/13/23 1500   Wound Width (cm) 0.7 cm 10/13/23 1500   Wound Depth (cm) 0.2 cm 10/13/23 1500   Wound Surface Area (cm^2) 0.49 cm^2 10/13/23 1500   Wound Volume (cm^3) 0.098 cm^3 10/13/23 1500   Post-Procedure Length (cm) 0.8 cm 10/13/23 1500   Post-Procedure Width (cm) 0.9 cm 10/13/23 1500   Post-Procedure Depth (cm) 0.2 cm 10/13/23 1500   Post-Procedure Surface Area (cm^2) 0.72 cm^2 10/13/23 1500   Post-Procedure Volume (cm^3) 0.144 cm^3 10/13/23 1500   Wound Healing % -308 10/13/23 1500   Tunneling (cm) 0 cm 10/13/23 1500   Tunneling Clock Position of Wound 5 10/06/23 1500   Undermining (cm) 0.7 cm 10/13/23 1500   Undermining of Wound, 1st Location From 6 o'clock;To 9 o'clock 10/13/23 1500   Wound Odor None 10/13/23 1500   Exposed Structures None 10/13/23 1500   Number of days: 63       Wound 09/13/23 Pressure Injury Coccyx Superior (Active)   Wound Image    10/13/23 1500   Site Assessment Red;Tunneling 10/13/23 1500   Periwound Assessment Intact 10/13/23 1500   Margins Unattached edges;Epibole (rolled edges) 10/13/23 1500   Drainage Amount Large 10/13/23 1500   Drainage Description Serosanguineous 10/13/23 1500   Treatments Cleansed;Provider debridement 10/13/23 1500   Wound Cleansing Hypochlorus Acid 10/13/23 1500   Periwound Protectant Skin Protectant Wipes to Periwound;Barrier Paste  10/13/23 1500   Dressing Status Old drainage 10/06/23 1500   Dressing Changed Changed 10/13/23 1500   Dressing Cleansing/Solutions Not Applicable 10/13/23 1500   Dressing Options Hydrofiber Silver Strip;Hydrofiber Silver;Offloading Dressing - Sacral 10/13/23 1500   Dressing Change/Treatment Frequency Every 72 hrs, and As Needed 10/13/23 1500   Wound Team Following Weekly 10/13/23 1500   WOUND NURSE ONLY - Pressure Injury Stage 4 10/13/23 1500   Non-staged Wound Description Full thickness 09/29/23 1600   Wound Length (cm) 1.1 cm 10/13/23 1500   Wound Width (cm) 3.2 cm 10/13/23 1500   Wound Depth (cm) 2.1 cm 10/13/23 1500   Wound Surface Area (cm^2) 3.52 cm^2 10/13/23 1500   Wound Volume (cm^3) 7.392 cm^3 10/13/23 1500   Post-Procedure Length (cm) 1.1 cm 10/13/23 1500   Post-Procedure Width (cm) 3.6 cm 10/13/23 1500   Post-Procedure Depth (cm) 2.1 cm 10/13/23 1500   Post-Procedure Surface Area (cm^2) 3.96 cm^2 10/13/23 1500   Post-Procedure Volume (cm^3) 8.316 cm^3 10/13/23 1500   Wound Healing % -1269 10/13/23 1500   Tunneling (cm) 3.2 cm 09/18/23 1200   Tunneling Clock Position of Wound 5 10/13/23 1500   Undermining (cm) 0 cm 10/13/23 1500   Wound Odor None 10/13/23 1500   Exposed Structures None 10/13/23 1500   Number of days: 30       PROCEDURE: Excisional debridement of sacral / coccygeal pressure injury-wounds communicate via tunnel  -2% viscous lidocaine applied topically to wound beds for approximately 5 minutes prior to debridement  -Curette used to debride wound bed.  Excisional debridement was performed to remove devitalized tissue until healthy, bleeding tissue was visualized.  Total wound area debrided approximately 20 cm² including into the wound tunnel.  Tissue debrided into the muscle / fascia layer.    -Bleeding controlled with manual pressure.    -Wound care completed by wound RN, refer to flowsheet  -Patient tolerated the procedure well, without c/o pain or discomfort.      PROCEDURE: Excisional  "debridement of left plantar/lateral foot ulcer  -2% viscous lidocaine applied topically to wound bed for approximately 5 minutes prior to debridement  -Curette used to debride wound bed.  Excisional debridement was performed to remove devitalized tissue until healthy, bleeding tissue was visualized.   Entire surface of wound, 0.72 cm² debrided.  Tissue debrided into the subcutaneous layer.    -Bleeding controlled with manual pressure.    -Wound care completed by wound RN, refer to flowsheet  -Patient tolerated the procedure well, without c/o pain or discomfort.      No debridement of left heel, left medial lower leg, or left posterior leg wounds required today.    BIOLOGIC LOG  5/20/2022-first application.  PRODUCT: EpiCord 2 x 3 cm . PRODUCT #[id number]; EXPIRES: 2026-12-01 5/27/2022- second application.  PRODUCT: EpiCordEX 2 x 3 cm . PRODUCT #[id number]; EXPIRES: 2026-09-01    6/3/2022- third application.  PRODUCT: EpiCordEX 2 x 3 cm . PRODUCT #[id number]; EXPIRES: 2026-10-01    6/10/2022- fourth application.  PRODUCT: EpiCordEX 2 x 3 cm . PRODUCT #[id number]; EXPIRES: 2026-09-01 6/17/2022- fifth application.  PRODUCT: EpiCordEX 2 x 3 cm . PRODUCT #[id number]; EXPIRES: 2027-02-01 6/24/2022- sixth application.  PRODUCT: EpiCordEX 2 x 3 cm . PRODUCT #[id number]; EXPIRES: 2027-02-01 07/01/22: Seventh application.  Product: Epicort Dx 2.0 x 3.0 cm reorder number [id number]; product number [id number]; expires 2027-02-01 7/22/2022- eighth application.  PRODUCT: EpiCord 2 x 3 cm, reorder #[id number]. PRODUCT #[id number]; EXPIRES: 2027-02-01      Pertinent Labs and Diagnostics:    Labs:     A1c: No results found for: \"HBA1C\"     [facility] results, 7/1/2022 (under media tab)    CRP 13    ESR 31      IMAGING:     10/3/2022-CT of pelvis with contrast  IMPRESSION:     1.  Posterior soft tissue sacral wound and 1.5 x 3.7 cm abscess.   2.  Progressive " destruction of the distal sacrum and proximal coccyx. Sclerosis and irregularity of the distal sacrum consistent with osteomyelitis.   3.  Old wound within the soft tissues posterior to the distal left SI joint with possible fistulous communication.    10/3/2022-CT of tib-fib with and without contrast, with reconstruction  IMPRESSION:     1.  Old fractures of the left tibia and fibula with extensive callus formation and bony bridging as well as extensive dystrophic calcifications. Similar to previous.   2.  Distal medial soft tissue wounds with ill-defined superficial in the soft tissue thickening and fluid collections suggestive of phlegmon. No organized abscess identified.   3.  No definite osteomyelitis.   4.  Arthritic changes.        VASCULAR STUDIES: No results found.    LAST  WOUND CULTURE:   Lab Results   Component Value Date/Time    CULTRSULT (A) 09/13/2023 04:10 PM     Growth noted after further incubation, see below for  organism identification.  Light growth usual skin ade.      CULTRSULT (A) 09/13/2023 04:10 PM     Proteus mirabilis ESBL  Light growth  Extended Spectrum Beta-lactamase (ESBL) isolated.  ESBL's may be clinically resistant to therapy with  Penicillins,Cephalosporins or Aztreonam despite  apparent in vitro susceptibility to some of these agents.  The patient requires contact isolation.  Please contact pharmacy or an Infectious Disease Specialist  if you have any questions about appropriate therapy.      CULTRSULT Klebsiella pneumoniae  Light growth   (A) 09/13/2023 04:10 PM    CULTRSULT (A) 09/13/2023 04:10 PM     Methicillin Resistant Staphylococcus aureus  Light growth        PATHOLOGY  2/17/2023-bone fragment extracted from left lower extremity wound\\  FINAL DIAGNOSIS:     A. Left leg bone fragment at base of chronic wound:          Extensively degenerated fibrocartilaginous tissue with a rim of           fibrinopurulent debris          Correlate with culture findings             Comment: While no residual intact bone is identified, these           findings are suggestive of adjacent osteomyelitis.      ASSESSMENT AND PLAN:     1. Sacral decubitus ulcer, stage IV ([facility])  Comments: Ulcer first noted in early April 2022 as small open area which quickly enlarged.  This ulcer is present distal from previous sacral ulcer which healed after surgery in January.  Patient has history of flap reconstruction x2 to this area.  CT shows progressive destruction of distal sacrum and proximal coccyx.    10/13/2023: Total wound area about the same, probes close to bone.  - Excisional debridement was performed in clinic, medically necessary to promote wound healing.   -Patient to return to clinic every 2 weeks.  He understands that we can increase frequency of clinic visits if wounds deteriorate.  -He is willing to consider restarting VAC to this wound.  Skin bridge between the 2 wounds were excised first.  Would like to delay doing this for at least 2 weeks because he has a visitor coming into town.  -Home health to change dressing 2 times per week on the week he comes into the clinic, 3 times on the opposite week.  -Patient does spend a lot of time up in his wheelchair, and wishes to continue to do so for his quality of life.  He lives independently, drives, and is involved with family activities.  He does have an appropriate pressure-relief cushion and is very well versed on pressure relieving techniques.  - Patient does not currently have follow up with Dr. Saini. If wound deteriorates or fails to improve can consider re-establishing with Dr. Saini for evaluation for coverage options. Patient does not want to pursue at this time and is happy with current conservative approach even with wound worsening.  - Known OM that has already been treated. No utility of Abx unless gross soft tissue infection which does not appear to be present today.    Wound care: Collagen, Hydrofiber silver strip, hydrofiber  silver, Mepilex    2. Postoperative wound dehiscence, subsequent encounter  3. Osteomyelitis of left lower extremity  Comments: On 4/26/2022 patient underwent irrigation and debridement of multiple compartments of the left lower extremity states for pressure injury, with bone excision, and complex closure of chronic wound using biologic skin substitute.  During postop visit in surgeons office on 5/11, surgical site was noted to be dehisced.  Patient was referred to wound clinic for management.    10/13/2023: No significant change from last visit.  Wound is covered with friable epithelium and boggy areas of tissue.  -No excisional debridement of this wound today  -Patient to return to clinic every 2 weeks for assessment and debridement if indicated  -Home health to change dressing 1-2 times per week in between clinic visits OM.  -Suspect underlying OM, however patient does not wish to consider surgical options at this time nor does his surgeon wish to do any further surgery to this leg. 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 There is no gross soft tissue infection and repeated Abx treatment without source control is not indicated.    -We will assess these wounds each clinic visit  -Patient to be mindful of offloading when supine, should assure that his leg is not rotating medially  Wound care: Hydrofiber silver, silicone adhesive foam, tubigrip    3. Deep tissue injury  4. Pressure injury of left foot, stage IV  Comments: DTI to posterior left lower leg first observed in clinic 7/29/2022.  Patient does not know how it started, had been wearing heel float boot consistently.  Evolved into stage IV pressure injury    10/13/2023: Posterior leg DTI, skin remains intact but fragile.  Plantar foot wound depth has decreased, boggy tissue within wound bed.  -Excisional debridement of foot ulcer in clinic today, medically necessary to promote wound healing  -Patient is well versed on offloading  techniques  - Patient to be mindful of offloading his foot and posterior leg       Wound care: Foot wound-  Puracyn Gel, Silver Hydrofiber to manage exudate and bioburden, foam cover dressing, Tubigrip D to manage edema    5. Pressure injury of left heel, stage 3 ([facility])  Comments: First noted in clinic on 10/21/2022, originally noted to be stage II    10/13/2023: Skin intact, though friable.  High risk for recurrence  -We will continue to monitor wound each visit, initiate treatment as indicated  - Patient continue wearing heel float boots at all times  - Heel does not appear to contact any solid surfaces while in wheelchair   Wound Care: Heel Mepilex to reduce pressure and friction    6. Quadriplegia, C5-C7, incomplete ([facility])  Comments: Complicating factor.  Impaired mobility and sensation  -Patient is still spending 7-8 hours/day up in his wheelchair, though he does have appropriate offloading cushion, and knows to reposition frequently.  Wears heel float boots bilaterally at all times      Please note that this note may have been created using voice recognition software. I have worked with technical experts from [employer] to optimize the interface.  I have made every reasonable attempt to correct obvious errors, but there may be errors of grammar and possibly content that I did not discover before finalizing the note.

## 2023-10-13 NOTE — PATIENT INSTRUCTIONS
-Keep your wound dressing clean, dry, and intact.    -Change your dressing every 3 to 4 days or if it becomes soiled, soaked, or falls off.    -Should you experience any significant changes in your wound(s), such as infection (redness, swelling, localized heat, increased pain, fever > 101 F, chills) or have any questions regarding your home care instructions, please contact the wound center at [phone]. If after hours, contact your primary care physician or go to the hospital emergency room.

## 2023-10-17 NOTE — ASSESSMENT & PLAN NOTE
MEDICARE WELLNESS VISIT NOTE    HISTORY OF PRESENT ILLNESS:   Mike Lombardi presents for his Subsequent Annual Medicare Wellness Visit.   He has additional health issues addressed in separate documentation.      Patient Care Team:  Jimi Rodriguez MD as PCP - General (Family Practice)  Kenneth Quijano MD (Neurology)  Reina Mckenzie MD (Dermatology)  Tom Dominguez MD as Endocrinologist (Internal Medicine - Endocrinology,Diabetes,Metabolism)  Soni Gómez MD (Ophthalmology)        Patient Active Problem List   Diagnosis   • MARJORIE on CPAP   • Idiopathic peripheral neuropathy   • White coat syndrome with diagnosis of hypertension   • Hyperlipidemia   • History of adenomatous polyp of colon   • Chronic gout of multiple sites   • Type 2 diabetes mellitus with microalbuminuria, without long-term current use of insulin (CMD)   • Psoriasis   • Small fiber neuropathy   • Numbness and tingling   • Peripheral polyneuropathy   • Neuralgia   • Thrombocytopenia (CMD)   • Lumbar degenerative disc disease   • Neural foraminal stenosis of lumbar spine   • Abnormal MRI, lumbar spine   • Mild cardiomegaly   • Nonobstructive atherosclerosis of coronary artery   • Allergic rhinitis   • Diverticulosis   • Gout   • History of pulmonary embolism   • Chronic respiratory failure with hypoxia (CMD)   • Pneumonia due to COVID-19 virus   • Acute hypoxemic respiratory failure due to COVID-19 (CMD)   • Hypoxia   • Hypertension   • Pulmonary fibrosis, postinflammatory (CMD)   • Tremor   • Hypothyroidism   • Osteopenia   • Low testosterone         Past Medical History:   Diagnosis Date   • Allergic rhinitis    • Bilateral pulmonary embolism (CMD) 12/28/2021   • Diverticulosis    • Eczema    • GI bleed     duodenal ulcer   • Gout    • Hemorrhoid    • Hyperlipidemia 03/16/2015   • Hypothyroidism 4/12/2023   • Idiopathic peripheral neuropathy 03/16/2015   • Low testosterone 5/12/2023   • Lumbar degenerative disc disease  Ostomy in place   08/30/2019   • Mild cardiomegaly     noted on CXR at outside facility on 6/3/09   • Nonobstructive atherosclerosis of coronary artery     noted on cardiac cath at outside facility on 6/5/09   • Olecranon bursitis of right elbow    • MARJORIE on CPAP 03/16/2015   • Osteopenia 5/12/2023   • Pneumonia due to COVID-19 virus 2/10/2022   • Psoriasis 03/20/2019   • PUD (peptic ulcer disease)    • Type 2 diabetes mellitus (CMD) 03/20/2019   • White coat syndrome with diagnosis of hypertension 03/16/2015         Past Surgical History:   Procedure Laterality Date   • Appendectomy     • Cataract extraction w/  intraocular lens implant Right 10/20/2021       • Cataract extraction w/  intraocular lens implant Left 04/13/2022    Dr. Gómez   • Cath place in coronary artery for angiography inj md sup and interp  06/05/2009   • Colonoscopy  1999    GI bleed    had another colonscopy following for follow up   • Colonoscopy  05/18/2021    Tubular adenomas.  Repeat in 3 yrs per GI.   • Egd      1990s   • Eye surgery  10/05/2021    Right cataract   • Finger surgery Right     thumb         Social History     Tobacco Use   • Smoking status: Never   • Smokeless tobacco: Never   Vaping Use   • Vaping Use: never used   Substance Use Topics   • Alcohol use: Yes     Alcohol/week: 7.0 - 8.0 standard drinks of alcohol     Types: 1 Cans of beer, 6 - 7 Standard drinks or equivalent per week     Comment: social has a drink nightly - helps with feet   • Drug use: No     Drug use:    Drug Use:    No              Family History   Problem Relation Age of Onset   • Hypertension Mother    • Hypertension Father    • Heart disease Father         chf   • Early death Sister    • Heart disease Brother    • Early death Brother    • Heart disease Paternal Grandfather         MI   • Hypertension Sister    • Hypertension Brother    • Cancer, Prostate Neg Hx        Current Outpatient Medications   Medication Sig Dispense Refill   • vitamin D3 (CHOLECALCIFEROL)  1.25 mg (50,000 units) capsule Take 50,000 Units by mouth 1 day a week.     • B Complex Vitamins (VITAMIN B COMPLEX PO)      • clindamycin (CLEOCIN) 300 MG capsule Take 1 capsule by mouth in the morning and 1 capsule at noon and 1 capsule in the evening. Do all this for 7 days. 21 capsule 0   • predniSONE (DELTASONE) 20 MG tablet Take 2 tablets by mouth daily for 5 days. 10 tablet 0   • lisinopril (ZESTRIL) 40 MG tablet TAKE 1 TABLET BY MOUTH DAILY 90 tablet 1   • DULoxetine (CYMBALTA) 30 MG capsule Take 1 capsule by mouth in the morning and 1 capsule in the evening. 180 capsule 1   • primidone (MYSOLINE) 50 MG tablet Take 2 tabs QAM  and one 1 tab QPM for 10 days then take 2 tabs  tablet 1   • levothyroxine 50 MCG tablet TAKE 1 TABLET BY MOUTH DAILY 90 tablet 3   • allopurinol (ZYLOPRIM) 100 MG tablet TAKE 2 TABLETS BY MOUTH DAILY 180 tablet 0   • cholecalciferol 1.25 mg (50,000 units) tablet Take 1 tablet by mouth every 7 days. 8 tablet 0   • metoPROLOL succinate (TOPROL-XL) 50 MG 24 hr tablet TAKE 1 TABLET BY MOUTH DAILY 90 tablet 1   • ZINC SULFATE PO Take 50 mg by mouth daily.     • atorvastatin (LIPITOR) 20 MG tablet TAKE 1 TABLET BY MOUTH DAILY 90 tablet 3   • folic acid (FOLATE) 400 MCG tablet Take 2 tablets by mouth daily. 1 tablet 0   • Alpha-Lipoic Acid 300 MG Tab Take 300 mg by mouth daily.     • aspirin 81 MG tablet Take 81 mg by mouth daily.       No current facility-administered medications for this visit.        The following items on the Medicare Health Risk Assessment were found to be positive  3.) During the past 4 weeks, how would you rate your health?: Fair     4.) During the past 4 weeks, what was the hardest physical activity you could do for at least 2 minutes?: Light     6 b.) How many servings of High Fiber / Whole Grain Foods to you have each day ( 1 serving = 1 cup cold cereal, 1/2 cup cooked cereal, 1 slice bread): 1 per day     7a.) Have you had a fall in the past year?: Yes      7b.) Do you feel unsteady when standing or walking?: Yes     7c.) Do you worry about falling?: Yes     8.) During the past 4 weeks, has your physical and emotional health limited your social activities with family, friends, neighbors, or other groups?: Moderately     11l.) Sexual Problems: Always     12.) Do you need help with any of the following activities?   (check all that apply): Dressing and grooming, Getting in and out of bed or chairs     14.) During the past 4 weeks, was someone available to help if you needed and wanted help?: Yes, some     15.) How confident are you that you can control and manage most of your health problems?: Somewhat confident         Vision and Hearing screens: See nursing documentation    Advance care planning documents on file - no     Cognitive/Functional Status: no evidence of cognitive dysfunction by direct observation    Opioid Review: Mike is not taking opioid medications.    Recent PHQ 2/9 Score:    PHQ 2:  PHQ 2 Score Adult PHQ 2 Score Adult PHQ 2 Interpretation Little interest or pleasure in activity?   10/17/2023  11:20 AM 0 No further screening needed 0       PHQ 9:       DEPRESSION ASSESSMENT/PLAN:  Depression screening is negative no further plan needed.     Body mass index is 31.76 kg/m².    BMI ASSESSMENT/PLAN:  Patient is obese.    Healthy diet and regular physical activity as tolerated encouraged          Needed follow up:  See separate documentation.  Follow-up with neurology as directed for peripheral neuropathy which is likely contributing to his stability issues as reported in the Medicare Health Risk Assessment above.  See orders.   See Patient Instructions section.   Return in about 3 months (around 1/17/2024) for med check, with fasting labs a few days prior.

## 2023-10-18 ENCOUNTER — HOSPITAL ENCOUNTER (OUTPATIENT)
Facility: MEDICAL CENTER | Age: 73
End: 2023-10-18
Attending: PHYSICIAN ASSISTANT
Payer: MEDICARE

## 2023-10-20 ENCOUNTER — OFFICE VISIT (OUTPATIENT)
Dept: WOUND CARE | Facility: MEDICAL CENTER | Age: 73
End: 2023-10-20
Attending: INTERNAL MEDICINE
Payer: MEDICARE

## 2023-10-20 ENCOUNTER — NON-PROVIDER VISIT (OUTPATIENT)
Dept: CARDIOLOGY | Facility: MEDICAL CENTER | Age: 73
End: 2023-10-20

## 2023-10-20 VITALS
TEMPERATURE: 97.2 F | DIASTOLIC BLOOD PRESSURE: 73 MMHG | HEART RATE: 51 BPM | RESPIRATION RATE: 18 BRPM | SYSTOLIC BLOOD PRESSURE: 92 MMHG | OXYGEN SATURATION: 95 %

## 2023-10-20 DIAGNOSIS — T81.31XD POSTOPERATIVE WOUND DEHISCENCE, SUBSEQUENT ENCOUNTER: ICD-10-CM

## 2023-10-20 DIAGNOSIS — L89.623 PRESSURE INJURY OF LEFT HEEL, STAGE 3 (HCC): ICD-10-CM

## 2023-10-20 DIAGNOSIS — G82.53 QUADRIPLEGIA, C5-C7, COMPLETE (HCC): ICD-10-CM

## 2023-10-20 DIAGNOSIS — M86.9 OSTEOMYELITIS OF LEFT LOWER EXTREMITY (HCC): ICD-10-CM

## 2023-10-20 DIAGNOSIS — L89.154 SACRAL DECUBITUS ULCER, STAGE IV (HCC): ICD-10-CM

## 2023-10-20 DIAGNOSIS — T14.8XXA DEEP TISSUE INJURY: ICD-10-CM

## 2023-10-20 DIAGNOSIS — L89.894 PRESSURE INJURY OF LEFT FOOT, STAGE 4 (HCC): ICD-10-CM

## 2023-10-20 PROCEDURE — 11043 DBRDMT MUSC&/FSCA 1ST 20/<: CPT | Performed by: NURSE PRACTITIONER

## 2023-10-20 PROCEDURE — 11042 DBRDMT SUBQ TIS 1ST 20SQCM/<: CPT | Mod: 59 | Performed by: NURSE PRACTITIONER

## 2023-10-20 PROCEDURE — 93298 REM INTERROG DEV EVAL SCRMS: CPT | Performed by: INTERNAL MEDICINE

## 2023-10-20 PROCEDURE — 3078F DIAST BP <80 MM HG: CPT | Performed by: NURSE PRACTITIONER

## 2023-10-20 PROCEDURE — 3074F SYST BP LT 130 MM HG: CPT | Performed by: NURSE PRACTITIONER

## 2023-10-20 PROCEDURE — 11042 DBRDMT SUBQ TIS 1ST 20SQCM/<: CPT

## 2023-10-20 PROCEDURE — 11043 DBRDMT MUSC&/FSCA 1ST 20/<: CPT

## 2023-10-20 NOTE — CARDIAC REMOTE MONITOR - SCAN
Device transmission reviewed. Device demonstrated appropriate function.       Electronically Signed by: Lex Skinner M.D.    10/20/2023  2:59 PM

## 2023-10-21 NOTE — PROGRESS NOTES
Provider Encounter- Pressure Injury        HISTORY OF PRESENT ILLNESS  Wound History:    START OF CARE IN CLINIC: 6/23/2023 (return to clinic after hospitalization)    REFERRING PROVIDER: Sahara Mendez      WOUNDS- Pressure Injury, Sacrococcygeal, stage IV                                                             Surgical wound dehiscence, left medial lower leg-status post surgical I&D of stage IV pressure injury and           biologic application                    Pressure injury, DTI, left posterior lower leg-first observed in clinic 7/29/2022       Pressure injury stage III L heel - first noted 10/21     Right 2nd toe avulsion - first noted 10/21     Skin tag left posterior ear - first noted 10/21     HISTORY: Patient with history of incomplete quadriplegia referred to [facility] for treatment of a stage IV pressure injury.  He has a history of previous pressure injuries to this area, and underwent muscle flaps in 2019, and then again in 2020.  He was seen in the wound clinic in November 2021 for an ulcer proximal from his current ulcer, and pressure injuries to his left posterior lower leg and left heel.  At that time, it was discovered that the patient had retained VAC foam embedded in the wound bed of the sacral wound.  Attempts were made to get him back to his plastic surgeon, though unsuccessful.  In January he underwent surgical removal of VAC sponge along with excisional debridement of his sacral wound by Dr. Chaves.  After the surgery, his wound went on to heal without incident.   In early April 2022, his home health nurse noted a new sacral ulcer, below the previous ulcer which quickly tripled in size over the following weeks.  The ulcer to his left medial lower leg had also deteriorated, with bone visible at the base..  He was hospitalized from 4/22 until 4/27/2022 and underwent surgery with Dr. Raman on 4/26 for irrigation and debridement of multiple compartments of the left lower extremity, bone  excision, and complex closure of chronic wound using biologic skin substitute.   His sacrococcygeal wound was not surgically addressed during this admission.  He was discharged back to his group home, with home health, and referral to outpatient wound clinic for his sacral wound.  He was instructed to follow-up with his surgeon for his lower leg wound.       Postoperatively, the left medial lower leg incision dehisced.  He was seen by his surgeon at [facility] on 5/11.  The surgeon opted to leave remaining sutures in place, and refer him to the wound clinic for treatment of this wound.   Treatment of this wound was initiated in clinic on 5/12.  During this visit was also noted that his heel DTI had resolved, but that he had a new pressure injury to his left posterior lower extremity.     A new pressure injury was noted to patient's right upper buttock/lower back on 5/20/2022.  Wound was linear in shape, skin discolored but intact.     Abrasion noted to left anterior lower leg.  First observed in clinic on 7/22/2022.  Patient states he bumped his leg into his food tray.     Small DTI noted to patient's left lateral lower leg on 7/29/2022.  Skin intact but discolored.     Large area of deep tissue injury noted to patient's left exterior lower leg.  Patient denied any trauma to this area.  Skin intact.  Wound documented.    1/27/2023: Patient was admitted to [facility] from 1/23-1/25/2023 with gross hematuria. He underwent RICHARD which showed watchman device was in place and he was taken off of Xarelto. While hospitalized wound team was consulted. He was referred back to [facility] and home health upon discharge.    Patient was hospitalized at [facility] for pyelonephritis from 2/26 until 3/2/2023, admitted for fever and general malaise.  He was admitted and initially started on linezolid and meropenem for suspected UTI and history of multidrug-resistant organisms.  Urine cultures were negative. ID was consulted, recommended CT of chest and  abdomen,which were negative for acute findings. However, he was treated with 5 days total course of antibiotics for suspected UTI, and symptoms completely resolved.  During this admission, the inpatient wound team was consulted for treatment of his sacral and lower leg wounds.  A wound culture was taken from his left heel pressure ulcer, negative.  Once stabilized, he was discharged home and referred back to [facility] to resume treatment of his wounds.    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  Never used smokeless tobacco    Patient's problem list, allergies, and current medications reviewed and updated in Epic    Interval History:  Interval History thinned 7/29/2022.  Please see previous notes for complete interval history.     Interval History thinned 1/27/2023. Please see previous note for complete interval history.    Interval History thinned 3/3/2023.  Please see previous notes for complete interval history.      Interval History thinned 8/4/2023.  Please see previous notes for complete interval history.      7/28/2023: Clinic visit with Steve Hodges MD. Patient reports being in normal state of health. Denies any current issues. His lower wounds are largely unchanged. Sacrum continues to enlarge, he reports that he has been up in chair more frequently this week. Patient counseled on risks of enlarging pressure injury including risk that wound may progress, known OM may worsen or expand including causing illness. He is aware of risks and possible other treatment options, he would like to proceed with current treatment.    8/4/2023 : Clinic visit with ADILSON Arthur, FNP-BC,  LILIYA VALERIO.  Anjum states he is feeling well overall.  Left medial lower leg wound has decreased in area significantly.  However, a new area has opened just below sacral ulcer,  from an ulcer by thin skin bridge.  Patient denies any changes to his usual activity.  His left heel wound remains healed, however he does have some slightly denuded skin to the heel.  He was seen by his podiatrist earlier this week, dressing was applied, possibly causing slight maceration.    8/11/2023 : Clinic visit with ADILSON Arthur FNP-BC, CWOCN, CFCN.   Anjum continues to feel well.  Unfortunately, left plantar foot lateral foot wound has reopened slightly.  The skin to his heel is slightly macerated, home health is using smaller heel foam dressing .  Sacrum measures about the same.  There is some friable red tissue along superior wound edge that was treated with AgNO3 today.    8/18/2023 : Clinic visit with ADILSON Arthur FNP-BC, CWOCN, CFCN.   Anjum states he is feeling well.  The plantar foot wound has deteriorated somewhat, area is increased.  Sacral wound is about the same.  Left lower leg wounds are both smaller, though with friable tissue.   Anjum's wounds have been essentially stable for a long time.  We discussed decreasing frequency of clinic visits to every 2 weeks.  Patient is agreeable to this plan.  He understands that we can resume weekly appointments if his wounds deteriorate.    9/1/2023 : Clinic visit with ADILSON Arthur FNP-BC, CWOCN, CFCN.   Anjum states he is in his usual state of health.  Unfortunately, his left heel wound has reopened, and his plantar foot wound has deteriorated.  The medial leg wound has again nearly closed, though tissue is boggy and will likely reopen.  His sacral wound continues to progress, though very slowly.    9/13/2023: Clinic visit with Steve Hodges MD. Patient reports feeling well. Left plantar lateral foot wound is larger with increased drainage, wound tract  that probes towards dorsal foot with some erythema and edema. Concerning for infection. No fluctuance. Patient has been on Augmentin, culture was cancelled previously as he was taking Abx before culture could be obtained. Due to worsening wound today, will add doxycycline to cover MRSA and obtain culture today, though may be sterile as has been on Abx. Sacral pressure injury with breakdown of previous wound site and larger. Patient is not overly concerned and does not want any surgical intervention.    9/18/2023: Clinic visit with Steve Hodges MD. Patient reports feeling in normal state of health. He denies any signs or symptoms of infection currently. Patient left foot wound appears improved. Patient remains on Augmentin. Wound culture was concerning for ESBL Proteus, will discuss case with ID as there are no simple oral options for coverage    9/29/2023 : Clinic visit with ADILSON Arthur, HOLDEN, GEETHA VALERIO   Anjum states he is feeling okay.  His plantar foot wound is again progressing, area has decreased.  The leg wound is nearly resolved, though poor tissue quality as previously noted.  He is left heel wound and left posterior leg wounds are also again resolved.  Unfortunately, his sacrococcygeal wound is larger, now communicates with superior wound.    10/6/2023 : Clinic visit with ADILSON Arthur, HOLDEN, LILIYA VALERIO.   Anjum continues to feel well.  His left lower extremity wounds  are unchanged from last visit, left foot wound continues to progress.  Superior sacral wound measures slightly larger, inferior wound slightly smaller.  Patient is still not interested in VAC to heal these wounds.      10/13/2023 : Clinic visit with ADILSON Arthur FNP-BC, GEETHA VALERIO states he is feeling well.  His lower extremity wounds and foot wound are currently progressing well.  His sacral/coccyx wounds are not progressing.  Discussed restarting VAC.  However, would like to excise skin bridge between  the 2 wounds prior.  Patient is agreeable to this idea, however states he has a friend coming in from out of town who will be here for approximately 10 days.  Would like to postpone procedure and restarting of VAC for approximately 2 weeks.    10/20/2023 : Clinic visit with Kelly Sandy, ADILSON, FNP-BC, CWOCN, CFCN.   Continues to feel well.  His plantar foot wound continues to progress.  Lateral leg wound is a bit larger today, though has tended to wax and wane.  His superior coccyx ulcer actually measures a bit smaller, and depth has decreased, however tissue somewhat boggy.  Inferior coccyx wound measures bit larger.  He still going to consider VAC to his coccyx wound, however wants to wait a few weeks as he has a friend coming into town.  We will revisit this next week.      REVIEW OF SYSTEMS:   Unchanged from previous wound clinic assessment on 10/13/2023, except as noted in interval history above     PHYSICAL EXAMINATION:   BP 92/73   Pulse (!) 51   Temp 36.2 °C (97.2 °F) (Temporal)   Resp 18   SpO2 95%   Physical Exam  Constitutional:       Appearance: He is obese.   Cardiovascular:      Rate and Rhythm: Normal rate.   Pulmonary:      Effort: Pulmonary effort is normal.   Abdominal:      Comments: Colostomy left lower quadrant   Genitourinary:     Comments: Suprapubic catheter to down drain   Skin:     Comments: Stage IV coccygeal pressure injury -superior and inferior wounds which communicate under a skin bridge.  Superior wound actually measures smaller, depth is also decreased, however tissue is somewhat boggy and of poor quality.  Inferior wound measures slightly larger, still probes close to bone.  Moderate serosanguineous drainage, no evidence of infection      Full-thickness wound to left medial lower leg -wound area continues to wax and wane, poor tissue quality as previously noted, moderate serosanguineous drainage    Stage III pressure injury left posterior heel - skin intact, though  friable.    Stage IV pressure injury plantar foot -wound area and depth continues to decrease, moderate serosanguineous drainage, no evidence of infection    Stage III pressure injury to posterior left lower leg, recurring-skin remains intact but discolored, friable, high risk for recurrence     Neurological:      Mental Status: He is alert and oriented to person, place, and time.   Psychiatric:         Mood and Affect: Mood normal.         WOUND ASSESSMENT  Wound 06/18/23 Pressure Injury Coccyx Stage IV (Active)   Wound Image    10/20/23 1500   Site Assessment Red;Undermining;Tunneling 10/20/23 1500   Periwound Assessment Maceration;Scar tissue 10/20/23 1500   Margins Epibole (rolled edges);Unattached edges 10/20/23 1500   Drainage Amount Moderate 10/20/23 1500   Drainage Description Serosanguineous 10/20/23 1500   Treatments Cleansed;Provider debridement;Site care 10/20/23 1500   Wound Cleansing Hypochlorus Acid 10/20/23 1500   Periwound Protectant Skin Protectant Wipes to Periwound;Barrier Paste 10/20/23 1500   Dressing Changed Changed 10/20/23 1500   Dressing Cleansing/Solutions Not Applicable 10/20/23 1500   Dressing Options Hydrofiber Silver Strip;Hydrofiber Silver;Offloading Dressing - Sacral 10/20/23 1500   Dressing Change/Treatment Frequency Every 72 hrs, and As Needed 10/20/23 1500   Wound Team Following Weekly 10/20/23 1500   WOUND NURSE ONLY - Pressure Injury Stage 4 10/20/23 1500   Wound Length (cm) 4.3 cm 10/20/23 1500   Wound Width (cm) 1.2 cm 10/20/23 1500   Wound Depth (cm) 1.7 cm 10/20/23 1500   Wound Surface Area (cm^2) 5.16 cm^2 10/20/23 1500   Wound Volume (cm^3) 8.772 cm^3 10/20/23 1500   Post-Procedure Length (cm) 4.3 cm 10/20/23 1500   Post-Procedure Width (cm) 1.2 cm 10/20/23 1500   Post-Procedure Depth (cm) 0.7 cm 10/20/23 1500   Post-Procedure Surface Area (cm^2) 5.16 cm^2 10/20/23 1500   Post-Procedure Volume (cm^3) 3.612 cm^3 10/20/23 1500   Wound Healing % -141 10/20/23 1500   Tunneling  (cm) 0 cm 10/20/23 1500   Tunneling Clock Position of Wound 12 09/29/23 1600   Undermining (cm) 2 cm 10/20/23 1500   Undermining of Wound, 1st Location From 12 o'clock;To 2 o'clock 10/20/23 1500   Undermining (cm) - 2nd location 3.1 cm 09/18/23 1200   Undermining of Wound, 2nd Location From 11 o'clock;From 1 o'clock 09/18/23 1200   Wound Odor None 10/20/23 1500   Exposed Structures Other (Comments) 10/20/23 1500   Number of days: 124       Wound 06/18/23 Full Thickness Wound Leg Medial Left (Active)   Wound Image   10/20/23 1500   Site Assessment Red;Boggy;Fragile 10/20/23 1500   Periwound Assessment Red;Purple;Blanchable erythema 10/20/23 1500   Margins Attached edges 10/20/23 1500   Drainage Amount Moderate 10/20/23 1500   Drainage Description Serosanguineous 10/20/23 1500   Treatments Cleansed;Site care 10/20/23 1500   Wound Cleansing Hypochlorus Acid 10/20/23 1500   Periwound Protectant Skin Protectant Wipes to Periwound;Barrier Paste 10/20/23 1500   Dressing Changed Changed 10/20/23 1500   Dressing Cleansing/Solutions Not Applicable 10/20/23 1500   Dressing Options Hydrofiber Silver;Silicone Adhesive Foam;Tubigrip 10/20/23 1500   Dressing Change/Treatment Frequency Every 72 hrs, and As Needed 10/20/23 1500   Wound Team Following Weekly 10/20/23 1500   Non-staged Wound Description Full thickness 10/20/23 1500   Wound Length (cm) 1 cm 10/20/23 1500   Wound Width (cm) 2.5 cm 10/20/23 1500   Wound Depth (cm) 0.3 cm 10/20/23 1500   Wound Surface Area (cm^2) 2.5 cm^2 10/20/23 1500   Wound Volume (cm^3) 0.75 cm^3 10/20/23 1500   Post-Procedure Length (cm) 1 cm 10/20/23 1500   Post-Procedure Width (cm) 2.5 cm 10/20/23 1500   Post-Procedure Depth (cm) 0.3 cm 10/20/23 1500   Post-Procedure Surface Area (cm^2) 2.5 cm^2 10/20/23 1500   Post-Procedure Volume (cm^3) 0.75 cm^3 10/20/23 1500   Wound Healing % -150 10/20/23 1500   Tunneling (cm) 0 cm 10/20/23 1500   Undermining (cm) 0 cm 10/20/23 1500   Undermining of Wound,  1st Location From 1 o'clock;To 2 o'clock 06/02/23 1600   Undermining (cm) - 2nd location 1.3 cm 06/02/23 1600   Undermining of Wound, 2nd Location From 3 o'clock;To 4 o'clock 06/02/23 1600   Wound Odor None 10/20/23 1500   Exposed Structures None 10/20/23 1500   Number of days: 124       Wound 08/11/23 Full Thickness Wound Foot Lateral Left (Active)   Wound Image    10/20/23 1500   Site Assessment Red;Boggy 10/20/23 1500   Periwound Assessment Maceration;Edema 10/20/23 1500   Margins Unattached edges;Attached edges 10/20/23 1500   Drainage Amount Moderate 10/20/23 1500   Drainage Description Serosanguineous 10/20/23 1500   Treatments Cleansed;Provider debridement;Site care 10/20/23 1500   Wound Cleansing Hypochlorus Acid 10/20/23 1500   Periwound Protectant Skin Protectant Wipes to Periwound;Barrier Paste 10/20/23 1500   Dressing Status Clean;Intact 08/11/23 1500   Dressing Changed Changed 10/20/23 1500   Dressing Cleansing/Solutions Not Applicable 10/20/23 1500   Dressing Options Hydrofiber Silver;Silicone Adhesive Foam;Tubigrip 10/20/23 1500   Dressing Change/Treatment Frequency Every 72 hrs, and As Needed 10/20/23 1500   Wound Team Following Weekly 10/20/23 1500   Non-staged Wound Description Full thickness 10/20/23 1500   Wound Length (cm) 0.5 cm 10/20/23 1500   Wound Width (cm) 0.7 cm 10/20/23 1500   Wound Depth (cm) 0.4 cm 10/20/23 1500   Wound Surface Area (cm^2) 0.35 cm^2 10/20/23 1500   Wound Volume (cm^3) 0.14 cm^3 10/20/23 1500   Post-Procedure Length (cm) 0.7 cm 10/20/23 1500   Post-Procedure Width (cm) 0.7 cm 10/20/23 1500   Post-Procedure Depth (cm) 0.2 cm 10/20/23 1500   Post-Procedure Surface Area (cm^2) 0.49 cm^2 10/20/23 1500   Post-Procedure Volume (cm^3) 0.098 cm^3 10/20/23 1500   Wound Healing % -483 10/20/23 1500   Tunneling (cm) 0 cm 10/20/23 1500   Tunneling Clock Position of Wound 5 10/06/23 1500   Undermining (cm) 0 cm 10/20/23 1500   Undermining of Wound, 1st Location From 6 o'clock;To 9  o'clock 10/13/23 1500   Wound Odor None 10/20/23 1500   Exposed Structures None 10/20/23 1500   Number of days: 70       Wound 09/13/23 Pressure Injury Coccyx Superior (Active)   Wound Image    10/20/23 1500   Site Assessment Red;Boggy 10/20/23 1500   Periwound Assessment Dry;Intact;Scar tissue 10/20/23 1500   Margins Unattached edges;Epibole (rolled edges) 10/20/23 1500   Drainage Amount Large 10/20/23 1500   Drainage Description Serosanguineous 10/20/23 1500   Treatments Cleansed;Provider debridement;Site care 10/20/23 1500   Wound Cleansing Hypochlorus Acid 10/20/23 1500   Periwound Protectant Skin Protectant Wipes to Periwound;Barrier Paste 10/20/23 1500   Dressing Status Old drainage 10/06/23 1500   Dressing Changed Changed 10/20/23 1500   Dressing Cleansing/Solutions Not Applicable 10/20/23 1500   Dressing Options Hydrofiber Silver Strip;Hydrofiber Silver;Offloading Dressing - Sacral 10/20/23 1500   Dressing Change/Treatment Frequency Every 72 hrs, and As Needed 10/20/23 1500   Wound Team Following Weekly 10/20/23 1500   WOUND NURSE ONLY - Pressure Injury Stage 4 10/20/23 1500   Non-staged Wound Description Full thickness 09/29/23 1600   Wound Length (cm) 2.5 cm 10/20/23 1500   Wound Width (cm) 1.2 cm 10/20/23 1500   Wound Depth (cm) 1.7 cm 10/20/23 1500   Wound Surface Area (cm^2) 3 cm^2 10/20/23 1500   Wound Volume (cm^3) 5.1 cm^3 10/20/23 1500   Post-Procedure Length (cm) 2.5 cm 10/20/23 1500   Post-Procedure Width (cm) 1.2 cm 10/20/23 1500   Post-Procedure Depth (cm) 1.7 cm 10/20/23 1500   Post-Procedure Surface Area (cm^2) 3 cm^2 10/20/23 1500   Post-Procedure Volume (cm^3) 5.1 cm^3 10/20/23 1500   Wound Healing % -844 10/20/23 1500   Tunneling (cm) 3.2 cm 09/18/23 1200   Tunneling Clock Position of Wound 5 10/13/23 1500   Undermining (cm) 0 cm 10/20/23 1500   Wound Odor None 10/20/23 1500   Exposed Structures None 10/20/23 1500   Number of days: 37       PROCEDURE: Excisional debridement of sacral /  coccygeal pressure injury-wounds communicate via tunnel  -2% viscous lidocaine applied topically to wound beds for approximately 5 minutes prior to debridement  -Curette used to debride wound bed.  Excisional debridement was performed to remove devitalized tissue until healthy, bleeding tissue was visualized.  Total wound area debrided approximately 20 cm² including into the wound tunnel.  Tissue debrided into the muscle / fascia layer.    -Bleeding controlled with manual pressure.    -Wound care completed by wound RN, refer to flowsheet  -Patient tolerated the procedure well, without c/o pain or discomfort.      PROCEDURE: Excisional debridement of left plantar/lateral foot ulcer  -2% viscous lidocaine applied topically to wound bed for approximately 5 minutes prior to debridement  -Curette used to debride wound bed.  Excisional debridement was performed to remove devitalized tissue until healthy, bleeding tissue was visualized.   Entire surface of wound, 0.49 cm² debrided.  Tissue debrided into the subcutaneous layer.    -Bleeding controlled with manual pressure.    -Wound care completed by wound RN, refer to flowsheet  -Patient tolerated the procedure well, without c/o pain or discomfort.      No debridement of left heel, left medial lower leg, or left posterior leg wounds required today.    BIOLOGIC LOG  5/20/2022-first application.  PRODUCT: EpiCord 2 x 3 cm . PRODUCT #[id number]; EXPIRES: 2026-12-01 5/27/2022- second application.  PRODUCT: EpiCordEX 2 x 3 cm . PRODUCT #[id number]; EXPIRES: 2026-09-01    6/3/2022- third application.  PRODUCT: EpiCordEX 2 x 3 cm . PRODUCT #[id number]; EXPIRES: 2026-10-01    6/10/2022- fourth application.  PRODUCT: EpiCordEX 2 x 3 cm . PRODUCT #[id number]; EXPIRES: 2026-09-01 6/17/2022- fifth application.  PRODUCT: EpiCordEX 2 x 3 cm . PRODUCT #[id number]; EXPIRES: 2027-02-01 6/24/2022- sixth application.  PRODUCT: EpiCordEX 2 x 3  "cm . PRODUCT #[id number]; EXPIRES: 2027-02-01 07/01/22: Seventh application.  Product: Epicort Dx 2.0 x 3.0 cm reorder number [id number]; product number [id number]; expires 2027-02-01 7/22/2022- eighth application.  PRODUCT: EpiCord 2 x 3 cm, reorder #[id number]. PRODUCT #[id number]; EXPIRES: 2027-02-01      Pertinent Labs and Diagnostics:    Labs:     A1c: No results found for: \"HBA1C\"     [facility] results, 7/1/2022 (under media tab)    CRP 13    ESR 31      IMAGING:     10/3/2022-CT of pelvis with contrast  IMPRESSION:     1.  Posterior soft tissue sacral wound and 1.5 x 3.7 cm abscess.   2.  Progressive destruction of the distal sacrum and proximal coccyx. Sclerosis and irregularity of the distal sacrum consistent with osteomyelitis.   3.  Old wound within the soft tissues posterior to the distal left SI joint with possible fistulous communication.    10/3/2022-CT of tib-fib with and without contrast, with reconstruction  IMPRESSION:     1.  Old fractures of the left tibia and fibula with extensive callus formation and bony bridging as well as extensive dystrophic calcifications. Similar to previous.   2.  Distal medial soft tissue wounds with ill-defined superficial in the soft tissue thickening and fluid collections suggestive of phlegmon. No organized abscess identified.   3.  No definite osteomyelitis.   4.  Arthritic changes.        VASCULAR STUDIES: No results found.    LAST  WOUND CULTURE:   Lab Results   Component Value Date/Time    CULTRSULT (A) 09/13/2023 04:10 PM     Growth noted after further incubation, see below for  organism identification.  Light growth usual skin ade.      CULTRSULT (A) 09/13/2023 04:10 PM     Proteus mirabilis ESBL  Light growth  Extended Spectrum Beta-lactamase (ESBL) isolated.  ESBL's may be clinically resistant to therapy with  Penicillins,Cephalosporins or Aztreonam despite  apparent in vitro susceptibility to some of these agents.  The patient requires " contact isolation.  Please contact pharmacy or an Infectious Disease Specialist  if you have any questions about appropriate therapy.      CULTRSULT Klebsiella pneumoniae  Light growth   (A) 09/13/2023 04:10 PM    CULTRSULT (A) 09/13/2023 04:10 PM     Methicillin Resistant Staphylococcus aureus  Light growth        PATHOLOGY  2/17/2023-bone fragment extracted from left lower extremity wound\\  FINAL DIAGNOSIS:     A. Left leg bone fragment at base of chronic wound:          Extensively degenerated fibrocartilaginous tissue with a rim of           fibrinopurulent debris          Correlate with culture findings            Comment: While no residual intact bone is identified, these           findings are suggestive of adjacent osteomyelitis.      ASSESSMENT AND PLAN:     1. Sacral decubitus ulcer, stage IV ([facility])  Comments: Ulcer first noted in early April 2022 as small open area which quickly enlarged.  This ulcer is present distal from previous sacral ulcer which healed after surgery in January.  Patient has history of flap reconstruction x2 to this area.  CT shows progressive destruction of distal sacrum and proximal coccyx.    10/20/2023: Superior wound has filled in somewhat with boggy tissue, area is decreased.  Inferior wound measures slightly larger, still probes close to bone  - Excisional debridement was performed in clinic, medically necessary to promote wound healing.   -Patient to return to clinic every 2 weeks.  He understands that we can increase frequency of clinic visits if wounds deteriorate.  -He is willing to consider restarting VAC to this wound, however would like to wait a few weeks as he has a friend coming into town.  -Home health to change dressing 2 times per week on the week he comes into the clinic, 3 times on the opposite week.  -Patient does spend a lot of time up in his wheelchair, and wishes to continue to do so for his quality of life.  He lives independently, drives, and is involved  with family activities.  He does have an appropriate pressure-relief cushion and is very well versed on pressure relieving techniques.  - Patient does not currently have follow up with Dr. Saini. If wound deteriorates or fails to improve can consider re-establishing with Dr. Saini for evaluation for coverage options. Patient does not want to pursue at this time and is happy with current conservative approach even with wound worsening.  - Known OM that has already been treated. No utility of Abx unless gross soft tissue infection which does not appear to be present today.    Wound care: Collagen, Hydrofiber silver strip, hydrofiber silver, Mepilex    2. Postoperative wound dehiscence, subsequent encounter  3. Osteomyelitis of left lower extremity  Comments: On 4/26/2022 patient underwent irrigation and debridement of multiple compartments of the left lower extremity states for pressure injury, with bone excision, and complex closure of chronic wound using biologic skin substitute.  During postop visit in surgeons office on 5/11, surgical site was noted to be dehisced.  Patient was referred to wound clinic for management.    10/20/2023: Wound area continues to wax and wane, poor tissue quality as previously noted.  -No excisional debridement of this wound today  -Patient to return to clinic every 2 weeks for assessment and debridement if indicated  -Home health to change dressing 1-2 times per week in between clinic visits OM.  -Suspect underlying OM, however patient does not wish to consider surgical options at this time nor does his surgeon wish to do any further surgery to this leg. 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 There is no gross soft tissue infection and repeated Abx treatment without source control is not indicated.    -We will assess these wounds each clinic visit  -Patient to be mindful of offloading when supine, should assure that his leg is not rotating  medially  Wound care: Hydrofiber silver, silicone adhesive foam, tubigrip    3. Deep tissue injury  4. Pressure injury of left foot, stage IV  Comments: DTI to posterior left lower leg first observed in clinic 7/29/2022.  Patient does not know how it started, had been wearing heel float boot consistently.  Evolved into stage IV pressure injury    10/20/2023: Plantar foot wound continues to progress, area and depth decreasing.  Posterior leg DTI, skin remains intact but discolored.  -Excisional debridement of foot ulcer in clinic today, medically necessary to promote wound healing  -Patient is well versed on offloading techniques  - Patient to be mindful of offloading his foot and posterior leg       Wound care: Foot wound-  Puracyn Gel, Silver Hydrofiber to manage exudate and bioburden, foam cover dressing, Tubigrip D to manage edema    5. Pressure injury of left heel, stage 3 ([facility])  Comments: First noted in clinic on 10/21/2022, originally noted to be stage II    10/20/2023: Skin remains intact, though friable  -We will continue to monitor wound each visit, initiate treatment as indicated  - Patient continue wearing heel float boots at all times  - Heel does not appear to contact any solid surfaces while in wheelchair   Wound Care: Heel Mepilex to reduce pressure and friction    6. Quadriplegia, C5-C7, incomplete ([facility])  Comments: Complicating factor.  Impaired mobility and sensation  -Patient is still spending 7-8 hours/day up in his wheelchair, though he does have appropriate offloading cushion, and knows to reposition frequently.  Wears heel float boots bilaterally at all times      Please note that this note may have been created using voice recognition software. I have worked with technical experts from [employer] to optimize the interface.  I have made every reasonable attempt to correct obvious errors, but there may be errors of grammar and possibly content that I did not discover before finalizing the  note.

## 2023-10-21 NOTE — PATIENT INSTRUCTIONS
-Keep your wound dressing clean, dry, and intact.    -Change your dressing if it becomes soiled, soaked, or falls off.    -Should you experience any significant changes in your wound(s), such as infection (redness, swelling, localized heat, increased pain, fever > 101 F, chills) or have any questions regarding your home care instructions, please contact the wound center at [phone]. If after hours, contact your primary care physician or go to the hospital emergency room.

## 2023-10-25 ENCOUNTER — OFFICE VISIT (OUTPATIENT)
Dept: WOUND CARE | Facility: MEDICAL CENTER | Age: 73
End: 2023-10-25
Attending: INTERNAL MEDICINE
Payer: MEDICARE

## 2023-10-25 VITALS
HEART RATE: 51 BPM | RESPIRATION RATE: 18 BRPM | SYSTOLIC BLOOD PRESSURE: 132 MMHG | OXYGEN SATURATION: 95 % | TEMPERATURE: 97 F | DIASTOLIC BLOOD PRESSURE: 68 MMHG

## 2023-10-25 DIAGNOSIS — L89.623 PRESSURE INJURY OF LEFT HEEL, STAGE 3 (HCC): ICD-10-CM

## 2023-10-25 DIAGNOSIS — L89.894 PRESSURE INJURY OF LEFT FOOT, STAGE 4 (HCC): ICD-10-CM

## 2023-10-25 DIAGNOSIS — L08.9 INFECTED WOUND: ICD-10-CM

## 2023-10-25 DIAGNOSIS — S99.922D INJURY OF TOENAIL OF LEFT FOOT, SUBSEQUENT ENCOUNTER: ICD-10-CM

## 2023-10-25 DIAGNOSIS — L89.154 SACRAL DECUBITUS ULCER, STAGE IV (HCC): ICD-10-CM

## 2023-10-25 DIAGNOSIS — T14.8XXA INFECTED WOUND: ICD-10-CM

## 2023-10-25 DIAGNOSIS — M86.9 OSTEOMYELITIS OF LEFT LOWER EXTREMITY (HCC): ICD-10-CM

## 2023-10-25 DIAGNOSIS — G82.53 QUADRIPLEGIA, C5-C7, COMPLETE (HCC): ICD-10-CM

## 2023-10-25 DIAGNOSIS — T81.31XD POSTOPERATIVE WOUND DEHISCENCE, SUBSEQUENT ENCOUNTER: ICD-10-CM

## 2023-10-25 DIAGNOSIS — T14.8XXA DEEP TISSUE INJURY: ICD-10-CM

## 2023-10-25 PROCEDURE — 3078F DIAST BP <80 MM HG: CPT | Performed by: NURSE PRACTITIONER

## 2023-10-25 PROCEDURE — 11042 DBRDMT SUBQ TIS 1ST 20SQCM/<: CPT | Mod: 59 | Performed by: NURSE PRACTITIONER

## 2023-10-25 PROCEDURE — 11043 DBRDMT MUSC&/FSCA 1ST 20/<: CPT | Performed by: NURSE PRACTITIONER

## 2023-10-25 PROCEDURE — 3075F SYST BP GE 130 - 139MM HG: CPT | Performed by: NURSE PRACTITIONER

## 2023-10-25 PROCEDURE — 11043 DBRDMT MUSC&/FSCA 1ST 20/<: CPT

## 2023-10-25 PROCEDURE — 11042 DBRDMT SUBQ TIS 1ST 20SQCM/<: CPT

## 2023-10-25 RX ORDER — AMOXICILLIN AND CLAVULANATE POTASSIUM 875; 125 MG/1; MG/1
1 TABLET, FILM COATED ORAL 2 TIMES DAILY
Qty: 20 TABLET | Refills: 0 | Status: SHIPPED | OUTPATIENT
Start: 2023-10-25 | End: 2023-11-04

## 2023-10-25 NOTE — PROGRESS NOTES
Provider Encounter- Pressure Injury        HISTORY OF PRESENT ILLNESS  Wound History:    START OF CARE IN CLINIC: 6/23/2023 (return to clinic after hospitalization)    REFERRING PROVIDER: Sahara Mendez      WOUNDS- Pressure Injury, Sacrococcygeal, stage IV                                                             Surgical wound dehiscence, left medial lower leg-status post surgical I&D of stage IV pressure injury and           biologic application                    Pressure injury, DTI, left posterior lower leg-first observed in clinic 7/29/2022       Pressure injury stage III L heel - first noted 10/21     Right 2nd toe avulsion - first noted 10/21     Skin tag left posterior ear - first noted 10/21     HISTORY: Patient with history of incomplete quadriplegia referred to [facility] for treatment of a stage IV pressure injury.  He has a history of previous pressure injuries to this area, and underwent muscle flaps in 2019, and then again in 2020.  He was seen in the wound clinic in November 2021 for an ulcer proximal from his current ulcer, and pressure injuries to his left posterior lower leg and left heel.  At that time, it was discovered that the patient had retained VAC foam embedded in the wound bed of the sacral wound.  Attempts were made to get him back to his plastic surgeon, though unsuccessful.  In January he underwent surgical removal of VAC sponge along with excisional debridement of his sacral wound by Dr. Chaves.  After the surgery, his wound went on to heal without incident.   In early April 2022, his home health nurse noted a new sacral ulcer, below the previous ulcer which quickly tripled in size over the following weeks.  The ulcer to his left medial lower leg had also deteriorated, with bone visible at the base..  He was hospitalized from 4/22 until 4/27/2022 and underwent surgery with Dr. Raman on 4/26 for irrigation and debridement of multiple compartments of the left lower extremity, bone  excision, and complex closure of chronic wound using biologic skin substitute.   His sacrococcygeal wound was not surgically addressed during this admission.  He was discharged back to his group home, with home health, and referral to outpatient wound clinic for his sacral wound.  He was instructed to follow-up with his surgeon for his lower leg wound.       Postoperatively, the left medial lower leg incision dehisced.  He was seen by his surgeon at [facility] on 5/11.  The surgeon opted to leave remaining sutures in place, and refer him to the wound clinic for treatment of this wound.   Treatment of this wound was initiated in clinic on 5/12.  During this visit was also noted that his heel DTI had resolved, but that he had a new pressure injury to his left posterior lower extremity.     A new pressure injury was noted to patient's right upper buttock/lower back on 5/20/2022.  Wound was linear in shape, skin discolored but intact.     Abrasion noted to left anterior lower leg.  First observed in clinic on 7/22/2022.  Patient states he bumped his leg into his food tray.     Small DTI noted to patient's left lateral lower leg on 7/29/2022.  Skin intact but discolored.     Large area of deep tissue injury noted to patient's left exterior lower leg.  Patient denied any trauma to this area.  Skin intact.  Wound documented.    1/27/2023: Patient was admitted to [facility] from 1/23-1/25/2023 with gross hematuria. He underwent RICHARD which showed watchman device was in place and he was taken off of Xarelto. While hospitalized wound team was consulted. He was referred back to [facility] and home health upon discharge.    Patient was hospitalized at [facility] for pyelonephritis from 2/26 until 3/2/2023, admitted for fever and general malaise.  He was admitted and initially started on linezolid and meropenem for suspected UTI and history of multidrug-resistant organisms.  Urine cultures were negative. ID was consulted, recommended CT of chest and  abdomen,which were negative for acute findings. However, he was treated with 5 days total course of antibiotics for suspected UTI, and symptoms completely resolved.  During this admission, the inpatient wound team was consulted for treatment of his sacral and lower leg wounds.  A wound culture was taken from his left heel pressure ulcer, negative.  Once stabilized, he was discharged home and referred back to [facility] to resume treatment of his wounds.    Pertinent Medical History: Incomplete quadriplegia, history of stage IV pressure injuries, history of flap procedures to sacral pressure injuries, osteomyelitis, obesity, colostomy in place   Contributing factors: Immobility and Obesity, impaired sensation    Personal support: Attendant-staff at [facility] and home health nursing    TOBACCO USE:   Former smoker, quit in 1977.  Never used smokeless tobacco    Patient's problem list, allergies, and current medications reviewed and updated in Epic    Interval History:  Interval History thinned 7/29/2022.  Please see previous notes for complete interval history.     Interval History thinned 1/27/2023. Please see previous note for complete interval history.    Interval History thinned 3/3/2023.  Please see previous notes for complete interval history.      Interval History thinned 8/4/2023.  Please see previous notes for complete interval history.      7/28/2023: Clinic visit with Steve Hodges MD. Patient reports being in normal state of health. Denies any current issues. His lower wounds are largely unchanged. Sacrum continues to enlarge, he reports that he has been up in chair more frequently this week. Patient counseled on risks of enlarging pressure injury including risk that wound may progress, known OM may worsen or expand including causing illness. He is aware of risks and possible other treatment options, he would like to proceed with current treatment.    8/4/2023 : Clinic visit with ADILSON Arthur, FNP-BC,  LILIYA VALERIO.  Anjum states he is feeling well overall.  Left medial lower leg wound has decreased in area significantly.  However, a new area has opened just below sacral ulcer,  from an ulcer by thin skin bridge.  Patient denies any changes to his usual activity.  His left heel wound remains healed, however he does have some slightly denuded skin to the heel.  He was seen by his podiatrist earlier this week, dressing was applied, possibly causing slight maceration.    8/11/2023 : Clinic visit with ADILSON Arthur FNP-BC, CWOCN, CFCN.   Anjum continues to feel well.  Unfortunately, left plantar foot lateral foot wound has reopened slightly.  The skin to his heel is slightly macerated, home health is using smaller heel foam dressing .  Sacrum measures about the same.  There is some friable red tissue along superior wound edge that was treated with AgNO3 today.    8/18/2023 : Clinic visit with ADILSON Arthur FNP-BC, CWOCN, CFCN.   Anjum states he is feeling well.  The plantar foot wound has deteriorated somewhat, area is increased.  Sacral wound is about the same.  Left lower leg wounds are both smaller, though with friable tissue.   Anjum's wounds have been essentially stable for a long time.  We discussed decreasing frequency of clinic visits to every 2 weeks.  Patient is agreeable to this plan.  He understands that we can resume weekly appointments if his wounds deteriorate.    9/1/2023 : Clinic visit with ADILSON Arthur FNP-BC, CWOCN, CFCN.   Anjum states he is in his usual state of health.  Unfortunately, his left heel wound has reopened, and his plantar foot wound has deteriorated.  The medial leg wound has again nearly closed, though tissue is boggy and will likely reopen.  His sacral wound continues to progress, though very slowly.    9/13/2023: Clinic visit with Steve Hodges MD. Patient reports feeling well. Left plantar lateral foot wound is larger with increased drainage, wound tract  that probes towards dorsal foot with some erythema and edema. Concerning for infection. No fluctuance. Patient has been on Augmentin, culture was cancelled previously as he was taking Abx before culture could be obtained. Due to worsening wound today, will add doxycycline to cover MRSA and obtain culture today, though may be sterile as has been on Abx. Sacral pressure injury with breakdown of previous wound site and larger. Patient is not overly concerned and does not want any surgical intervention.    9/18/2023: Clinic visit with Steve Hodges MD. Patient reports feeling in normal state of health. He denies any signs or symptoms of infection currently. Patient left foot wound appears improved. Patient remains on Augmentin. Wound culture was concerning for ESBL Proteus, will discuss case with ID as there are no simple oral options for coverage    9/29/2023 : Clinic visit with ADILSON Arthur, HOLDEN, GEETHA VALERIO   Anjum states he is feeling okay.  His plantar foot wound is again progressing, area has decreased.  The leg wound is nearly resolved, though poor tissue quality as previously noted.  He is left heel wound and left posterior leg wounds are also again resolved.  Unfortunately, his sacrococcygeal wound is larger, now communicates with superior wound.    10/6/2023 : Clinic visit with ADILSON Arthur, HOLDEN, LILIYA VALERIO.   Anjum continues to feel well.  His left lower extremity wounds  are unchanged from last visit, left foot wound continues to progress.  Superior sacral wound measures slightly larger, inferior wound slightly smaller.  Patient is still not interested in VAC to heal these wounds.      10/13/2023 : Clinic visit with ADILSON Arthur FNP-BC, GEETHA VALERIO states he is feeling well.  His lower extremity wounds and foot wound are currently progressing well.  His sacral/coccyx wounds are not progressing.  Discussed restarting VAC.  However, would like to excise skin bridge between  the 2 wounds prior.  Patient is agreeable to this idea, however states he has a friend coming in from out of town who will be here for approximately 10 days.  Would like to postpone procedure and restarting of VAC for approximately 2 weeks.    10/20/2023 : Clinic visit with ADILSON Arthur, HOLDEN, IMAN, LILIYA.   Continues to feel well.  His plantar foot wound continues to progress.  Lateral leg wound is a bit larger today, though has tended to wax and wane.  His superior coccyx ulcer actually measures a bit smaller, and depth has decreased, however tissue somewhat boggy.  Inferior coccyx wound measures bit larger.  He still going to consider VAC to his coccyx wound, however wants to wait a few weeks as he has a friend coming into town.  We will revisit this next week.    10/25/2023 : Clinic visit with ADILSON Arthur, HOLDEN, IMAN, LILIYA.   Trach is in his usual state of health, states he is feeling well overall.  He does have some redness and swelling to his left second toe, toenail is loose.  He states he believes he may have bumped into something, but is not sure when or what.  He does not appear to be a significant wound.  I sent Rx for Augmentin to cover possible infection.  We will monitor this wound each visit.  May need to remove toenail at some point.   His foot wound is again nearly resolved, sacral wounds are about the same.      REVIEW OF SYSTEMS:   Unchanged from previous wound clinic assessment on 10/20/2023, except as noted in interval history above     PHYSICAL EXAMINATION:   /68   Pulse (!) 51   Temp 36.1 °C (97 °F) (Temporal)   Resp 18   SpO2 95%   Physical Exam  Constitutional:       Appearance: He is obese.   Cardiovascular:      Rate and Rhythm: Normal rate.   Pulmonary:      Effort: Pulmonary effort is normal.   Abdominal:      Comments: Colostomy left lower quadrant   Genitourinary:     Comments: Suprapubic catheter to down drain   Skin:     Comments: Stage IV coccygeal  pressure injury -2 wounds, superior and inferior.  Total wound area about the same.  Wounds communicate under skin bridge.  Moderate serosanguineous drainage, no odor, no periwound erythema or induration      Full-thickness wound to left medial lower leg -wound area continues to wax and wane, poor tissue quality as previously noted, no evidence of infection.    Stage III pressure injury left posterior heel -skin is again intact, friable.    Stage IV pressure injury plantar foot -wound area and depth have decreased, moderate serosanguineous drainage, no odor, no periwound erythema or induration    Stage III pressure injury to posterior left lower leg, recurring-skin is intact but discolored, friable, high risk for recurrence    Left second toe-edematous and erythemic, toenail is loose, small amount of serosanguineous drainage from base of nail, no distinct open wound noted     Neurological:      Mental Status: He is alert and oriented to person, place, and time.   Psychiatric:         Mood and Affect: Mood normal.         WOUND ASSESSMENT  Wound 06/18/23 Pressure Injury Coccyx Stage IV Inferior wound (Active)   Wound Image    10/25/23 1500   Site Assessment Red;Undermining 10/25/23 1500   Periwound Assessment Maceration;Scar tissue 10/25/23 1500   Margins Epibole (rolled edges);Unattached edges 10/25/23 1500   Drainage Amount Moderate 10/25/23 1500   Drainage Description Serosanguineous 10/25/23 1500   Treatments Cleansed;Provider debridement 10/25/23 1500   Wound Cleansing Hypochlorus Acid 10/25/23 1500   Periwound Protectant Skin Protectant Wipes to Periwound;Barrier Paste 10/25/23 1500   Dressing Changed New 10/25/23 1500   Dressing Cleansing/Solutions Normal Saline 10/25/23 1500   Dressing Options Collagen Dressing;Hydrofiber Silver Strip;Hydrofiber Silver;Offloading Dressing - Sacral 10/25/23 1500   Dressing Change/Treatment Frequency Monday, Wednesday, Friday, and As Needed 10/25/23 1500   Wound Team Following  Weekly 10/25/23 1500   WOUND NURSE ONLY - Pressure Injury Stage 4 10/25/23 1500   Wound Length (cm) 4.1 cm 10/25/23 1500   Wound Width (cm) 1.5 cm 10/25/23 1500   Wound Depth (cm) 0.7 cm 10/25/23 1500   Wound Surface Area (cm^2) 6.15 cm^2 10/25/23 1500   Wound Volume (cm^3) 4.305 cm^3 10/25/23 1500   Post-Procedure Length (cm) 4.1 cm 10/25/23 1500   Post-Procedure Width (cm) 1.5 cm 10/25/23 1500   Post-Procedure Depth (cm) 0.8 cm 10/25/23 1500   Post-Procedure Surface Area (cm^2) 6.15 cm^2 10/25/23 1500   Post-Procedure Volume (cm^3) 4.92 cm^3 10/25/23 1500   Wound Healing % -18 10/25/23 1500   Tunneling (cm) 3 cm 10/25/23 1500   Tunneling Clock Position of Wound 12 10/25/23 1500   Undermining (cm) 0.7 cm 10/25/23 1500   Undermining of Wound, 1st Location From 6 o'clock;To 7 o'clock 10/25/23 1500   Undermining (cm) - 2nd location 3.1 cm 09/18/23 1200   Undermining of Wound, 2nd Location From 11 o'clock;From 1 o'clock 09/18/23 1200   Wound Odor None 10/25/23 1500   Exposed Structures Other (Comments) 10/25/23 1500   Number of days: 129       Wound 06/18/23 Full Thickness Wound Leg Medial Left (Active)   Wound Image   10/25/23 1500   Site Assessment Red;Boggy 10/25/23 1500   Periwound Assessment Red;Purple;Blanchable erythema;Excoriated;Edema 10/25/23 1500   Margins Attached edges 10/25/23 1500   Drainage Amount Moderate 10/25/23 1500   Drainage Description Serosanguineous 10/25/23 1500   Treatments Cleansed;Nonselective debridement 10/25/23 1500   Wound Cleansing Hypochlorus Acid 10/25/23 1500   Periwound Protectant Barrier Paste 10/25/23 1500   Dressing Changed New 10/25/23 1500   Dressing Cleansing/Solutions Normal Saline 10/25/23 1500   Dressing Options Collagen Dressing;Hydrofiber Silver;Silicone Adhesive Foam;Tubigrip 10/25/23 1500   Dressing Change/Treatment Frequency Monday, Wednesday, Friday, and As Needed 10/25/23 1500   Wound Team Following Weekly 10/25/23 1500   Non-staged Wound Description Full thickness  10/25/23 1500   Wound Length (cm) 0.9 cm 10/25/23 1500   Wound Width (cm) 1.1 cm 10/25/23 1500   Wound Depth (cm) 0.6 cm 10/25/23 1500   Wound Surface Area (cm^2) 0.99 cm^2 10/25/23 1500   Wound Volume (cm^3) 0.594 cm^3 10/25/23 1500   Post-Procedure Length (cm) 1 cm 10/20/23 1500   Post-Procedure Width (cm) 2.5 cm 10/20/23 1500   Post-Procedure Depth (cm) 0.3 cm 10/20/23 1500   Post-Procedure Surface Area (cm^2) 2.5 cm^2 10/20/23 1500   Post-Procedure Volume (cm^3) 0.75 cm^3 10/20/23 1500   Wound Healing % -98 10/25/23 1500   Tunneling (cm) 0 cm 10/25/23 1500   Undermining (cm) 0 cm 10/25/23 1500   Undermining of Wound, 1st Location From 1 o'clock;To 2 o'clock 06/02/23 1600   Undermining (cm) - 2nd location 1.3 cm 06/02/23 1600   Undermining of Wound, 2nd Location From 3 o'clock;To 4 o'clock 06/02/23 1600   Wound Odor None 10/25/23 1500   Exposed Structures Other (Comments) 10/25/23 1500   Number of days: 129       Wound 08/11/23 Full Thickness Wound Foot Lateral Left (Active)   Wound Image    10/25/23 1500   Site Assessment Red;Boggy;Epithelialization 10/25/23 1500   Periwound Assessment Dry;Scar tissue;Edema 10/25/23 1500   Margins Unattached edges;Attached edges 10/25/23 1500   Drainage Amount Small 10/25/23 1500   Drainage Description Serosanguineous 10/25/23 1500   Treatments Cleansed;Provider debridement 10/25/23 1500   Wound Cleansing Hypochlorus Acid 10/25/23 1500   Periwound Protectant Barrier Paste 10/25/23 1500   Dressing Status Clean;Intact 08/11/23 1500   Dressing Changed New 10/25/23 1500   Dressing Cleansing/Solutions Normal Saline 10/25/23 1500   Dressing Options Collagen Dressing;Hydrofiber Silver;Silicone Adhesive Foam;Tubigrip 10/25/23 1500   Dressing Change/Treatment Frequency Every 72 hrs, and As Needed 10/25/23 1500   Wound Team Following Weekly 10/25/23 1500   Non-staged Wound Description Full thickness 10/25/23 1500   Wound Length (cm) 0.3 cm 10/25/23 1500   Wound Width (cm) 0.5 cm 10/25/23  1500   Wound Depth (cm) 0.4 cm 10/20/23 1500   Wound Surface Area (cm^2) 0.15 cm^2 10/25/23 1500   Wound Volume (cm^3) 0.14 cm^3 10/20/23 1500   Post-Procedure Length (cm) 0.5 cm 10/25/23 1500   Post-Procedure Width (cm) 0.6 cm 10/25/23 1500   Post-Procedure Depth (cm) 0.1 cm 10/25/23 1500   Post-Procedure Surface Area (cm^2) 0.3 cm^2 10/25/23 1500   Post-Procedure Volume (cm^3) 0.03 cm^3 10/25/23 1500   Wound Healing % -483 10/20/23 1500   Tunneling (cm) 0 cm 10/25/23 1500   Tunneling Clock Position of Wound 5 10/06/23 1500   Undermining (cm) 0.3 cm 10/25/23 1500   Undermining of Wound, 1st Location From 5 o'clock;To 7 o'clock 10/25/23 1500   Wound Odor None 10/25/23 1500   Exposed Structures None 10/25/23 1500   Number of days: 75       Wound 09/13/23 Pressure Injury Coccyx Superior (Active)   Wound Image    10/25/23 1500   Site Assessment Red;Boggy 10/25/23 1500   Periwound Assessment Scar tissue 10/25/23 1500   Margins Unattached edges;Epibole (rolled edges) 10/25/23 1500   Drainage Amount Moderate 10/25/23 1500   Drainage Description Serosanguineous 10/25/23 1500   Treatments Cleansed;Provider debridement 10/25/23 1500   Wound Cleansing Hypochlorus Acid 10/25/23 1500   Periwound Protectant Skin Protectant Wipes to Periwound;Barrier Paste 10/25/23 1500   Dressing Status Old drainage 10/06/23 1500   Dressing Changed New 10/25/23 1500   Dressing Cleansing/Solutions Normal Saline 10/25/23 1500   Dressing Options Collagen Dressing;Hydrofiber Silver Strip;Hydrofiber Silver;Offloading Dressing - Sacral 10/25/23 1500   Dressing Change/Treatment Frequency Monday, Wednesday, Friday, and As Needed 10/25/23 1500   Wound Team Following Weekly 10/25/23 1500   WOUND NURSE ONLY - Pressure Injury Stage 4 10/25/23 1500   Non-staged Wound Description Full thickness 10/25/23 1500   Wound Length (cm) 3.1 cm 10/25/23 1500   Wound Width (cm) 0.5 cm 10/25/23 1500   Wound Depth (cm) 1.9 cm 10/25/23 1500   Wound Surface Area (cm^2) 1.55  cm^2 10/25/23 1500   Wound Volume (cm^3) 2.945 cm^3 10/25/23 1500   Post-Procedure Length (cm) 3.1 cm 10/25/23 1500   Post-Procedure Width (cm) 0.5 cm 10/25/23 1500   Post-Procedure Depth (cm) 2 cm 10/25/23 1500   Post-Procedure Surface Area (cm^2) 1.55 cm^2 10/25/23 1500   Post-Procedure Volume (cm^3) 3.1 cm^3 10/25/23 1500   Wound Healing % -445 10/25/23 1500   Tunneling (cm) 3 cm 10/25/23 1500   Tunneling Clock Position of Wound 6 10/25/23 1500   Undermining (cm) 0.7 cm 10/25/23 1500   Undermining of Wound, 1st Location From 7 o'clock;To 9 o'clock 10/25/23 1500   Wound Odor None 10/25/23 1500   Exposed Structures None 10/25/23 1500   Number of days: 42       Wound 10/25/23 Full Thickness Wound Ankle Lateral Left (Active)   Wound Image   10/25/23 1500   Site Assessment Red;Yellow 10/25/23 1500   Periwound Assessment Dry;Edema 10/25/23 1500   Margins Attached edges 10/25/23 1500   Drainage Amount Small 10/25/23 1500   Drainage Description Serosanguineous 10/25/23 1500   Treatments Cleansed;Nonselective debridement 10/25/23 1500   Wound Cleansing Hypochlorus Acid 10/25/23 1500   Periwound Protectant Barrier Paste 10/25/23 1500   Dressing Cleansing/Solutions Not Applicable 10/25/23 1500   Dressing Options Hydrofiber Silver;Silicone Adhesive Foam;Offloading Dressing - Heel;Tubigrip 10/25/23 1500   Dressing Change/Treatment Frequency Monday, Wednesday, Friday, and As Needed 10/25/23 1500   Non-staged Wound Description Full thickness 10/25/23 1500   Wound Length (cm) 0.4 cm 10/25/23 1500   Wound Width (cm) 0.4 cm 10/25/23 1500   Wound Depth (cm) 0.1 cm 10/25/23 1500   Wound Surface Area (cm^2) 0.16 cm^2 10/25/23 1500   Wound Volume (cm^3) 0.016 cm^3 10/25/23 1500   Tunneling (cm) 0 cm 10/25/23 1500   Undermining (cm) 0 cm 10/25/23 1500   Wound Odor None 10/25/23 1500   Exposed Structures None 10/25/23 1500   Number of days: 0       Wound 10/25/23 Pressure Injury Buttocks Right Inferior buttock/superior posterior thigh  (Active)   Wound Image   10/25/23 1500   Site Assessment Yellow;Jal 10/25/23 1500   Periwound Assessment Dry 10/25/23 1500   Margins Attached edges 10/25/23 1500   Drainage Amount KEN 10/25/23 1500   Treatments Cleansed;Nonselective debridement 10/25/23 1500   Wound Cleansing Hypochlorus Acid 10/25/23 1500   Periwound Protectant Barrier Paste 10/25/23 1500   Dressing Cleansing/Solutions Not Applicable 10/25/23 1500   Dressing Options Other (Comments);Silicone Adhesive Foam 10/25/23 1500   Dressing Change/Treatment Frequency Monday, Wednesday, Friday, and As Needed 10/25/23 1500   Wound Team Following Weekly 10/25/23 1500   WOUND NURSE ONLY - Pressure Injury Stage 2 10/25/23 1500   Wound Length (cm) 0.4 cm 10/25/23 1500   Wound Width (cm) 0.2 cm 10/25/23 1500   Wound Depth (cm) 0.1 cm 10/25/23 1500   Wound Surface Area (cm^2) 0.08 cm^2 10/25/23 1500   Wound Volume (cm^3) 0.008 cm^3 10/25/23 1500   Tunneling (cm) 0 cm 10/25/23 1500   Undermining (cm) 0 cm 10/25/23 1500   Wound Odor None 10/25/23 1500   Exposed Structures None 10/25/23 1500   Number of days: 0       Wound 10/25/23 Full Thickness Wound Toe, 2nd Dorsal Left Cuticle (Active)   Wound Image   10/25/23 1500   Site Assessment Red 10/25/23 1500   Periwound Assessment Blanchable erythema;Ecchymosis;Edema;Fragile;Other (Comment) 10/25/23 1500   Margins Attached edges 10/25/23 1500   Drainage Amount KEN 10/25/23 1500   Treatments Cleansed;Nonselective debridement 10/25/23 1500   Wound Cleansing Hypochlorus Acid 10/25/23 1500   Periwound Protectant Skin Protectant Wipes to Periwound 10/25/23 1500   Dressing Cleansing/Solutions Not Applicable 10/25/23 1500   Dressing Options Hydrofiber Silver;Nonadhesive Foam;Hypafix Tape;Tubigrip 10/25/23 1500   Dressing Change/Treatment Frequency Monday, Wednesday, Friday, and As Needed 10/25/23 1500   Wound Team Following Weekly 10/25/23 1500   Non-staged Wound Description Full thickness 10/25/23 1500   Wound Length (cm) 0.1 cm  10/25/23 1500   Wound Width (cm) 0.6 cm 10/25/23 1500   Wound Depth (cm) 0.1 cm 10/25/23 1500   Wound Surface Area (cm^2) 0.06 cm^2 10/25/23 1500   Wound Volume (cm^3) 0.006 cm^3 10/25/23 1500   Tunneling (cm) 0 cm 10/25/23 1500   Undermining (cm) 0 cm 10/25/23 1500   Wound Odor None 10/25/23 1500   Exposed Structures None 10/25/23 1500   Number of days: 0       PROCEDURE: Excisional debridement of sacral / coccygeal pressure injury-wounds communicate via tunnel  -2% viscous lidocaine applied topically to wound beds for approximately 5 minutes prior to debridement  -Curette used to debride wound bed.  Excisional debridement was performed to remove devitalized tissue until healthy, bleeding tissue was visualized.  Total wound area debrided approximately 15.0 cm² including into the wound tunnel.  Tissue debrided into the muscle / fascia layer.    -Bleeding controlled with manual pressure.    -Wound care completed by wound RN, refer to flowsheet  -Patient tolerated the procedure well, without c/o pain or discomfort.      PROCEDURE: Excisional debridement of left plantar/lateral foot ulcer  -2% viscous lidocaine applied topically to wound bed for approximately 5 minutes prior to debridement  -Curette used to debride wound bed.  Excisional debridement was performed to remove devitalized tissue until healthy, bleeding tissue was visualized.   Entire surface of wound, 0.3 cm² debrided.  Tissue debrided into the subcutaneous layer.    -Bleeding controlled with manual pressure.    -Wound care completed by wound RN, refer to flowsheet  -Patient tolerated the procedure well, without c/o pain or discomfort.      No debridement of left heel, left medial lower leg, or left posterior leg wounds required today.    BIOLOGIC LOG  5/20/2022-first application.  PRODUCT: EpiCord 2 x 3 cm . PRODUCT #[id number]; EXPIRES: 2026-12-01 5/27/2022- second application.  PRODUCT: EpiCordEX 2 x 3 cm . PRODUCT #[id number];  "EXPIRES: 2026-09-01    6/3/2022- third application.  PRODUCT: EpiCordEX 2 x 3 cm . PRODUCT #[id number]; EXPIRES: 2026-10-01    6/10/2022- fourth application.  PRODUCT: EpiCordEX 2 x 3 cm . PRODUCT #[id number]; EXPIRES: 2026-09-01 6/17/2022- fifth application.  PRODUCT: EpiCordEX 2 x 3 cm . PRODUCT #[id number]; EXPIRES: 2027-02-01 6/24/2022- sixth application.  PRODUCT: EpiCordEX 2 x 3 cm . PRODUCT #[id number]; EXPIRES: 2027-02-01 07/01/22: Seventh application.  Product: Epicort Dx 2.0 x 3.0 cm reorder number [id number]; product number [id number]; expires 2027-02-01 7/22/2022- eighth application.  PRODUCT: EpiCord 2 x 3 cm, reorder #[id number]. PRODUCT #[id number]; EXPIRES: 2027-02-01      Pertinent Labs and Diagnostics:    Labs:     A1c: No results found for: \"HBA1C\"     [facility] results, 7/1/2022 (under media tab)    CRP 13    ESR 31      IMAGING:     10/3/2022-CT of pelvis with contrast  IMPRESSION:     1.  Posterior soft tissue sacral wound and 1.5 x 3.7 cm abscess.   2.  Progressive destruction of the distal sacrum and proximal coccyx. Sclerosis and irregularity of the distal sacrum consistent with osteomyelitis.   3.  Old wound within the soft tissues posterior to the distal left SI joint with possible fistulous communication.    10/3/2022-CT of tib-fib with and without contrast, with reconstruction  IMPRESSION:     1.  Old fractures of the left tibia and fibula with extensive callus formation and bony bridging as well as extensive dystrophic calcifications. Similar to previous.   2.  Distal medial soft tissue wounds with ill-defined superficial in the soft tissue thickening and fluid collections suggestive of phlegmon. No organized abscess identified.   3.  No definite osteomyelitis.   4.  Arthritic changes.        VASCULAR STUDIES: No results found.    LAST  WOUND CULTURE:   Lab Results   Component Value Date/Time    CULTRSULT (A) 09/13/2023 04:10 PM     " Growth noted after further incubation, see below for  organism identification.  Light growth usual skin ade.      CULTRSULT (A) 09/13/2023 04:10 PM     Proteus mirabilis ESBL  Light growth  Extended Spectrum Beta-lactamase (ESBL) isolated.  ESBL's may be clinically resistant to therapy with  Penicillins,Cephalosporins or Aztreonam despite  apparent in vitro susceptibility to some of these agents.  The patient requires contact isolation.  Please contact pharmacy or an Infectious Disease Specialist  if you have any questions about appropriate therapy.      CULTRSULT Klebsiella pneumoniae  Light growth   (A) 09/13/2023 04:10 PM    CULTRSULT (A) 09/13/2023 04:10 PM     Methicillin Resistant Staphylococcus aureus  Light growth        PATHOLOGY  2/17/2023-bone fragment extracted from left lower extremity wound\\  FINAL DIAGNOSIS:     A. Left leg bone fragment at base of chronic wound:          Extensively degenerated fibrocartilaginous tissue with a rim of           fibrinopurulent debris          Correlate with culture findings            Comment: While no residual intact bone is identified, these           findings are suggestive of adjacent osteomyelitis.      ASSESSMENT AND PLAN:     1. Sacral decubitus ulcer, stage IV ([facility])  Comments: Ulcer first noted in early April 2022 as small open area which quickly enlarged.  This ulcer is present distal from previous sacral ulcer which healed after surgery in January.  Patient has history of flap reconstruction x2 to this area.  CT shows progressive destruction of distal sacrum and proximal coccyx.    10/25/2023: No significant change in wound area or depth.  Thin layer of slough to wound bed  - Excisional debridement was performed in clinic, medically necessary to promote wound healing.   -Patient to return to clinic in 10 days .  His usual schedule was changed due to holiday on Friday.  -He is still willing to consider restarting VAC to this wound, will discuss in the next  week or 2.  He has a pacer from out of town and would like to wait until she is gone.  -Home health to change dressing 2 times per week on the week he comes into the clinic, 3 times on the opposite week.  -Patient does spend a lot of time up in his wheelchair, and wishes to continue to do so for his quality of life.  He lives independently, drives, and is involved with family activities.  He does have an appropriate pressure-relief cushion and is very well versed on pressure relieving techniques.  - Patient does not currently have follow up with Dr. Saini. If wound deteriorates or fails to improve can consider re-establishing with Dr. Saini for evaluation for coverage options. Patient does not want to pursue at this time and is happy with current conservative approach even with wound worsening.  - Known OM that has already been treated. No utility of Abx unless gross soft tissue infection which does not appear to be present today.    Wound care: Collagen, Hydrofiber silver strip, hydrofiber silver, Mepilex    2. Postoperative wound dehiscence, subsequent encounter  3. Osteomyelitis of left lower extremity  Comments: On 4/26/2022 patient underwent irrigation and debridement of multiple compartments of the left lower extremity states for pressure injury, with bone excision, and complex closure of chronic wound using biologic skin substitute.  During postop visit in surgeons office on 5/11, surgical site was noted to be dehisced.  Patient was referred to wound clinic for management.    10/25/2023: Left medial lower leg wound continues to wax and wane, poor tissue quality  -No excisional debridement of this wound today  -Patient to return to clinic weekly for assessment and debridement  -Home health to change dressing 1-2 times per week in between clinic visits OM.  -Suspect underlying OM, however patient does not wish to consider surgical options at this time nor does his surgeon wish to do any further surgery to  this leg. 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 There is no gross soft tissue infection and repeated Abx treatment without source control is not indicated.    -We will assess these wounds each clinic visit  -Patient to be mindful of offloading when supine, should assure that his leg is not rotating medially  Wound care: Hydrofiber silver, silicone adhesive foam, tubigrip    4. Deep tissue injury  5. Pressure injury of left foot, stage IV  Comments: DTI to posterior left lower leg first observed in clinic 7/29/2022.  Patient does not know how it started, had been wearing heel float boot consistently.  Evolved into stage IV pressure injury    10/25/2020: After foot wound is currently progressing well, area and depth have decreased  -Excisional debridement of foot ulcer in clinic today, medically necessary to promote wound healing  -Patient is well versed on offloading techniques  - Patient to be mindful of offloading his foot and posterior leg       Wound care: Foot wound-  Puracyn Gel, Silver Hydrofiber to manage exudate and bioburden, foam cover dressing, Tubigrip D to manage edema    6. Pressure injury of left heel, stage 3 ([facility])  Comments: First noted in clinic on 10/21/2022, originally noted to be stage II    10/25/2023: Skin is intact though very friable.  High risk for pressure injury recurrence  -We will continue to monitor wound each visit, initiate treatment as indicated  - Patient continue wearing heel float boots at all times  - Heel does not appear to contact any solid surfaces while in wheelchair   Wound Care: Heel Mepilex to reduce pressure and friction    7. Quadriplegia, C5-C7, incomplete ([facility])  Comments: Complicating factor.  Impaired mobility and sensation  -Patient is still spending 7-8 hours/day up in his wheelchair, though he does have appropriate offloading cushion, and knows to reposition frequently.  Wears heel float boots bilaterally at all times    8.  Injury  of toenail of left foot, subsequent encounter  9.  Infected wound    10/25/2023: Patient presents today with edema and erythema to his left second toe.  Drainage noted from base of nail, though no distinct open wound.  Toenail is a bit loose.  -Rx for Augmentin sent to patient's pharmacy  -Monitor each clinic visit  -May need to remove toenail on future visit      Please note that this note may have been created using voice recognition software. I have worked with technical experts from [facility] [employer] to optimize the interface.  I have made every reasonable attempt to correct obvious errors, but there may be errors of grammar and possibly content that I did not discover before finalizing the note.

## 2023-10-26 NOTE — PATIENT INSTRUCTIONS
-Keep your wound dressing clean, dry, and intact.    -Change your dressing if it becomes soiled, soaked, or falls off.    -Should you experience any significant changes in your wound(s), such as infection (redness, swelling, localized heat, increased pain, fever > 101 F, chills) or have any questions regarding your home care instructions, please contact the wound center at [phone]. If after hours, contact your primary care physician or go to the hospital emergency room.

## 2023-11-03 ENCOUNTER — OFFICE VISIT (OUTPATIENT)
Dept: WOUND CARE | Facility: MEDICAL CENTER | Age: 73
End: 2023-11-03
Attending: INTERNAL MEDICINE
Payer: MEDICARE

## 2023-11-03 VITALS
RESPIRATION RATE: 16 BRPM | HEART RATE: 51 BPM | TEMPERATURE: 97.6 F | DIASTOLIC BLOOD PRESSURE: 67 MMHG | OXYGEN SATURATION: 95 % | SYSTOLIC BLOOD PRESSURE: 124 MMHG

## 2023-11-03 DIAGNOSIS — L89.894 PRESSURE INJURY OF LEFT FOOT, STAGE 4 (HCC): ICD-10-CM

## 2023-11-03 DIAGNOSIS — G82.53 QUADRIPLEGIA, C5-C7, COMPLETE (HCC): ICD-10-CM

## 2023-11-03 DIAGNOSIS — T14.8XXA DEEP TISSUE INJURY: ICD-10-CM

## 2023-11-03 DIAGNOSIS — T14.8XXA INFECTED WOUND: ICD-10-CM

## 2023-11-03 DIAGNOSIS — S99.922D INJURY OF TOENAIL OF LEFT FOOT, SUBSEQUENT ENCOUNTER: ICD-10-CM

## 2023-11-03 DIAGNOSIS — M86.9 OSTEOMYELITIS OF LEFT LOWER EXTREMITY (HCC): ICD-10-CM

## 2023-11-03 DIAGNOSIS — L08.9 INFECTED WOUND: ICD-10-CM

## 2023-11-03 DIAGNOSIS — L89.154 SACRAL DECUBITUS ULCER, STAGE IV (HCC): ICD-10-CM

## 2023-11-03 DIAGNOSIS — T81.31XD POSTOPERATIVE WOUND DEHISCENCE, SUBSEQUENT ENCOUNTER: ICD-10-CM

## 2023-11-03 DIAGNOSIS — L89.623 PRESSURE INJURY OF LEFT HEEL, STAGE 3 (HCC): ICD-10-CM

## 2023-11-03 PROCEDURE — 11730 AVULSION NAIL PLATE SIMPLE 1: CPT

## 2023-11-03 PROCEDURE — 11043 DBRDMT MUSC&/FSCA 1ST 20/<: CPT | Performed by: NURSE PRACTITIONER

## 2023-11-03 PROCEDURE — 3078F DIAST BP <80 MM HG: CPT | Performed by: NURSE PRACTITIONER

## 2023-11-03 PROCEDURE — 3074F SYST BP LT 130 MM HG: CPT | Performed by: NURSE PRACTITIONER

## 2023-11-03 PROCEDURE — 11042 DBRDMT SUBQ TIS 1ST 20SQCM/<: CPT

## 2023-11-03 PROCEDURE — 11730 AVULSION NAIL PLATE SIMPLE 1: CPT | Mod: T1,59 | Performed by: NURSE PRACTITIONER

## 2023-11-03 PROCEDURE — 11043 DBRDMT MUSC&/FSCA 1ST 20/<: CPT

## 2023-11-03 NOTE — PROGRESS NOTES
Home wound care order routed to [facility] via [facility] ([place], [place]).    Name: Jabier Drake MRN: [id number]     Date: 5/11/2021 Status: Scheduled     Time: 10:45 AM Length: 20     Visit Type: MYC OFFICE VISIT SB1 CR [4788] Copay: $0.00     Provider: Aaseby-Aguilera, Ramona Ann, PA-C Department:  FAMILY PRACTICE     Encounter #: [id number] Notes: [facility] 05/11/21 NEEDS TO RESCHEDULE PROVIDER OUT.CC Congestion, labored breathing   Printed on:         Made On:  Confirmed:  Change Notes: 5/10/2021 7:17 PM  5/10/2021 7:17 PM  5/11/2021 7:52 AM By:  By:  By: ESPERANZA LINO [place] CHAMBERLAIN, CAITLIN Caitlin Chamberlain/

## 2023-11-03 NOTE — PATIENT INSTRUCTIONS
-Keep your wound dressing clean, dry, and intact.    -Change your dressing if it becomes soiled, soaked, or falls off.    -Should you experience any significant changes in your wound(s), such as infection (redness, swelling, localized heat, increased pain, fever > 101 F, chills) or have any questions regarding your home care instructions, please contact the wound center at [phone]. If after hours, contact your primary care physician or go to the hospital emergency room.

## 2023-11-04 NOTE — PROGRESS NOTES
Provider Encounter- Pressure Injury        HISTORY OF PRESENT ILLNESS  Wound History:    START OF CARE IN CLINIC: 6/23/2023 (return to clinic after hospitalization)    REFERRING PROVIDER: Sahara Mendez      WOUNDS- Pressure Injury, Sacrococcygeal, stage IV                                                             Surgical wound dehiscence, left medial lower leg-status post surgical I&D of stage IV pressure injury and           biologic application                    Pressure injury, DTI, left posterior lower leg-first observed in clinic 7/29/2022       Pressure injury stage III L heel - first noted 10/21     Right 2nd toe avulsion - first noted 10/21     Skin tag left posterior ear - first noted 10/21     HISTORY: Patient with history of incomplete quadriplegia referred to [facility] for treatment of a stage IV pressure injury.  He has a history of previous pressure injuries to this area, and underwent muscle flaps in 2019, and then again in 2020.  He was seen in the wound clinic in November 2021 for an ulcer proximal from his current ulcer, and pressure injuries to his left posterior lower leg and left heel.  At that time, it was discovered that the patient had retained VAC foam embedded in the wound bed of the sacral wound.  Attempts were made to get him back to his plastic surgeon, though unsuccessful.  In January he underwent surgical removal of VAC sponge along with excisional debridement of his sacral wound by Dr. Chaves.  After the surgery, his wound went on to heal without incident.   In early April 2022, his home health nurse noted a new sacral ulcer, below the previous ulcer which quickly tripled in size over the following weeks.  The ulcer to his left medial lower leg had also deteriorated, with bone visible at the base..  He was hospitalized from 4/22 until 4/27/2022 and underwent surgery with Dr. Raman on 4/26 for irrigation and debridement of multiple compartments of the left lower extremity, bone  excision, and complex closure of chronic wound using biologic skin substitute.   His sacrococcygeal wound was not surgically addressed during this admission.  He was discharged back to his group home, with home health, and referral to outpatient wound clinic for his sacral wound.  He was instructed to follow-up with his surgeon for his lower leg wound.       Postoperatively, the left medial lower leg incision dehisced.  He was seen by his surgeon at [facility] on 5/11.  The surgeon opted to leave remaining sutures in place, and refer him to the wound clinic for treatment of this wound.   Treatment of this wound was initiated in clinic on 5/12.  During this visit was also noted that his heel DTI had resolved, but that he had a new pressure injury to his left posterior lower extremity.     A new pressure injury was noted to patient's right upper buttock/lower back on 5/20/2022.  Wound was linear in shape, skin discolored but intact.     Abrasion noted to left anterior lower leg.  First observed in clinic on 7/22/2022.  Patient states he bumped his leg into his food tray.     Small DTI noted to patient's left lateral lower leg on 7/29/2022.  Skin intact but discolored.     Large area of deep tissue injury noted to patient's left exterior lower leg.  Patient denied any trauma to this area.  Skin intact.  Wound documented.    1/27/2023: [place] [facility] [place] with gross hematuria. He underwent RICHARD which showed watchman device was in place and he was taken off of Xarelto. While hospitalized wound team was consulted. He was referred back to [facility] and home health upon discharge.    Patient was hospitalized at [facility] for pyelonephritis from 2/26 until 3/2/2023, admitted for fever and general malaise.  He was admitted and initially started on linezolid and meropenem for suspected UTI and history of multidrug-resistant organisms.  Urine cultures were negative. ID was consulted, recommended CT of chest and  abdomen,which were negative for acute findings. However, he was treated with 5 days total course of antibiotics for suspected UTI, and symptoms completely resolved.  During this admission, the inpatient wound team was consulted for treatment of his sacral and lower leg wounds.  A wound culture was taken from his left heel pressure ulcer, negative.  Once stabilized, he was discharged home and referred back to [facility] to resume treatment of his wounds.    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  Never used smokeless tobacco    Patient's problem list, allergies, and current medications reviewed and updated in Epic    Interval History:  Interval History thinned 7/29/2022.  Please see previous notes for complete interval history.     Interval History thinned 1/27/2023. Please see previous note for complete interval history.    Interval History thinned 3/3/2023.  Please see previous notes for complete interval history.      Interval History thinned 8/4/2023.  Please see previous notes for complete interval history.      7/28/2023: Clinic visit with Steve Hodges MD. Patient reports being in normal state of health. Denies any current issues. His lower wounds are largely unchanged. Sacrum continues to enlarge, he reports that he has been up in chair more frequently this week. Patient counseled on risks of enlarging pressure injury including risk that wound may progress, known OM may worsen or expand including causing illness. He is aware of risks and possible other treatment options, he would like to proceed with current treatment.    8/4/2023 : Clinic visit with ADILSON Arthur, FNP-BC,  LILIYA VALERIO.  Anjum states he is feeling well overall.  Left medial lower leg wound has decreased in area significantly.  However, a new area has opened just below sacral ulcer,  from an ulcer by thin skin bridge.  Patient denies any changes to his usual activity.  His left heel wound remains healed, however he does have some slightly denuded skin to the heel.  He was seen by his podiatrist earlier this week, dressing was applied, possibly causing slight maceration.    8/11/2023 : Clinic visit with ADILSON Arthur FNP-BC, CWOCN, CFCN.   Anjum continues to feel well.  Unfortunately, left plantar foot lateral foot wound has reopened slightly.  The skin to his heel is slightly macerated, home health is using smaller heel foam dressing .  Sacrum measures about the same.  There is some friable red tissue along superior wound edge that was treated with AgNO3 today.    8/18/2023 : Clinic visit with ADILSON Arthur FNP-BC, CWOCN, CFCN.   Anjum states he is feeling well.  The plantar foot wound has deteriorated somewhat, area is increased.  Sacral wound is about the same.  Left lower leg wounds are both smaller, though with friable tissue.   Anjum's wounds have been essentially stable for a long time.  We discussed decreasing frequency of clinic visits to every 2 weeks.  Patient is agreeable to this plan.  He understands that we can resume weekly appointments if his wounds deteriorate.    9/1/2023 : Clinic visit with ADILSON Arthur FNP-BC, CWOCN, CFCN.   Anjum states he is in his usual state of health.  Unfortunately, his left heel wound has reopened, and his plantar foot wound has deteriorated.  The medial leg wound has again nearly closed, though tissue is boggy and will likely reopen.  His sacral wound continues to progress, though very slowly.    9/13/2023: Clinic visit with Steve Hodges MD. Patient reports feeling well. Left plantar lateral foot wound is larger with increased drainage, wound tract  that probes towards dorsal foot with some erythema and edema. Concerning for infection. No fluctuance. Patient has been on Augmentin, culture was cancelled previously as he was taking Abx before culture could be obtained. Due to worsening wound today, will add doxycycline to cover MRSA and obtain culture today, though may be sterile as has been on Abx. Sacral pressure injury with breakdown of previous wound site and larger. Patient is not overly concerned and does not want any surgical intervention.    9/18/2023: Clinic visit with Steve Hodges MD. Patient reports feeling in normal state of health. He denies any signs or symptoms of infection currently. Patient left foot wound appears improved. Patient remains on Augmentin. Wound culture was concerning for ESBL Proteus, will discuss case with ID as there are no simple oral options for coverage    9/29/2023 : Clinic visit with ADILSON Arthur, HOLDEN, GEETHA VALEROI   Anjum states he is feeling okay.  His plantar foot wound is again progressing, area has decreased.  The leg wound is nearly resolved, though poor tissue quality as previously noted.  He is left heel wound and left posterior leg wounds are also again resolved.  Unfortunately, his sacrococcygeal wound is larger, now communicates with superior wound.    10/6/2023 : Clinic visit with ADILSON Arthur, HOLDEN, LILIYA VALERIO.   Anjum continues to feel well.  His left lower extremity wounds  are unchanged from last visit, left foot wound continues to progress.  Superior sacral wound measures slightly larger, inferior wound slightly smaller.  Patient is still not interested in VAC to heal these wounds.      10/13/2023 : Clinic visit with ADILSON Arthur FNP-BC, GEETHA VALERIO states he is feeling well.  His lower extremity wounds and foot wound are currently progressing well.  His sacral/coccyx wounds are not progressing.  Discussed restarting VAC.  However, would like to excise skin bridge between  the 2 wounds prior.  Patient is agreeable to this idea, however states he has a friend coming in from out of town who will be here for approximately 10 days.  Would like to postpone procedure and restarting of VAC for approximately 2 weeks.    10/20/2023 : Clinic visit with ADILSON Arthur, HOLDEN, IMAN, LILIYA.   Continues to feel well.  His plantar foot wound continues to progress.  Lateral leg wound is a bit larger today, though has tended to wax and wane.  His superior coccyx ulcer actually measures a bit smaller, and depth has decreased, however tissue somewhat boggy.  Inferior coccyx wound measures bit larger.  He still going to consider VAC to his coccyx wound, however wants to wait a few weeks as he has a friend coming into town.  We will revisit this next week.    10/25/2023 : Clinic visit with ADILSON Arthur, HOLDEN, IMAN, LILIYA.   Anjum is in his usual state of health, states he is feeling well overall.  He does have some redness and swelling to his left second toe, toenail is loose.  He states he believes he may have bumped into something, but is not sure when or what.  He does not appear to be a significant wound.  I sent Rx for Augmentin to cover possible infection.  We will monitor this wound each visit.  May need to remove toenail at some point.   His foot wound is again nearly resolved, sacral wounds are about the same.    11/3/2023 : Clinic visit with ADILSON Arthur, HOLDEN, IMAN, LILIYA.   Anjum is feeling well today.  His plantar foot wound is again resolved.  Medial lower leg wound continues to wax and wane with poor tissue quality.  His heel and posterior leg wounds both present with intact skin, though friable.   His left second toenail is loose, first noted last visit.  Toenail removed in clinic today   Both sacral wounds have decreased in area.  Anjum had some concerns about starting VAC.  Given the current progress of these wounds, I do not feel VAC is necessary.  This can be  reconsidered if these wounds stall or deteriorate.      REVIEW OF SYSTEMS:   Unchanged from previous wound clinic assessment on 10/25/2023, except as noted in interval history above     PHYSICAL EXAMINATION:   /67   Pulse (!) 51   Temp 36.4 °C (97.6 °F) (Temporal)   Resp 16   SpO2 95%   Physical Exam  Constitutional:       Appearance: He is obese.   Cardiovascular:      Rate and Rhythm: Normal rate.   Pulmonary:      Effort: Pulmonary effort is normal.   Abdominal:      Comments: Colostomy left lower quadrant   Genitourinary:     Comments: Suprapubic catheter to down drain   Skin:     Comments: Stage IV coccygeal pressure injury -2 wounds, superior and inferior.  Total wound area has decreased significantly since last assessment.  Wounds still communicate under skin bridge.  Moderate serosanguineous drainage, no odor, no periwound erythema or induration      Full-thickness wound to left medial lower leg -wound area continues to wax and wane, poor tissue quality, no evidence of infection.    Stage III pressure injury left posterior heel -skin intact, though friable.    Stage IV pressure injury plantar foot -wound has resolved, covered with friable epithelium,    Stage III pressure injury to posterior left lower leg, recurring-skin remains intact but discolored, friable, high risk for recurrence    Left second toe-edematous and erythemic, toenail is loose-toenail removed in clinic today, small full-thickness wound to nail bed, sanguinous drainage, toe slightly edematous and erythemic     Neurological:      Mental Status: He is alert and oriented to person, place, and time.   Psychiatric:         Mood and Affect: Mood normal.         WOUND ASSESSMENT  Wound 06/18/23 Pressure Injury Coccyx Stage IV Inferior wound (Active)   Wound Image    11/03/23 1515   Site Assessment Red;Yellow;Tunneling;Undermining 11/03/23 1515   Periwound Assessment Maceration;Scar tissue 11/03/23 1515   Margins Epibole (rolled  edges);Unattached edges 11/03/23 1515   Drainage Amount Moderate 11/03/23 1515   Drainage Description Serosanguineous 11/03/23 1515   Treatments Cleansed;Provider debridement;Site care 11/03/23 1515   Wound Cleansing Normal Saline Irrigation 11/03/23 1515   Periwound Protectant Skin Protectant Wipes to Periwound;Barrier Paste 11/03/23 1515   Dressing Changed Changed 11/03/23 1515   Dressing Cleansing/Solutions Not Applicable 11/03/23 1515   Dressing Options Hydrofiber Silver;Offloading Dressing - Sacral 11/03/23 1515   Dressing Change/Treatment Frequency Monday, Wednesday, Friday, and As Needed 11/03/23 1515   Wound Team Following Weekly 11/03/23 1515   WOUND NURSE ONLY - Pressure Injury Stage 4 11/03/23 1515   Wound Length (cm) 4 cm 11/03/23 1515   Wound Width (cm) 0.7 cm 11/03/23 1515   Wound Depth (cm) 0.8 cm 11/03/23 1515   Wound Surface Area (cm^2) 2.8 cm^2 11/03/23 1515   Wound Volume (cm^3) 2.24 cm^3 11/03/23 1515   Post-Procedure Length (cm) 4.1 cm 11/03/23 1515   Post-Procedure Width (cm) 0.8 cm 11/03/23 1515   Post-Procedure Depth (cm) 0.9 cm 11/03/23 1515   Post-Procedure Surface Area (cm^2) 3.28 cm^2 11/03/23 1515   Post-Procedure Volume (cm^3) 2.952 cm^3 11/03/23 1515   Wound Healing % 38 11/03/23 1515   Tunneling (cm) 2.5 cm 11/03/23 1515   Tunneling Clock Position of Wound 12 11/03/23 1515   Undermining (cm) 0.8 cm 11/03/23 1515   Undermining of Wound, 1st Location From 6 o'clock;To 7 o'clock 11/03/23 1515   Undermining (cm) - 2nd location 3.1 cm 09/18/23 1200   Undermining of Wound, 2nd Location From 11 o'clock;From 1 o'clock 09/18/23 1200   Wound Odor None 11/03/23 1515   Exposed Structures Other (Comments) 11/03/23 1515   Number of days: 138       Wound 06/18/23 Full Thickness Wound Leg Medial Left (Active)   Wound Image   11/03/23 1515   Site Assessment Red;Yellow;Boggy 11/03/23 1515   Periwound Assessment Red;Purple;Blanchable erythema;Edema 11/03/23 1515   Margins Attached edges 11/03/23 1515    Drainage Amount Moderate 11/03/23 1515   Drainage Description Serosanguineous 11/03/23 1515   Treatments Cleansed;Site care 11/03/23 1515   Wound Cleansing Foam Cleanser/Washcloth 11/03/23 1515   Periwound Protectant Barrier Paste 11/03/23 1515   Dressing Changed Changed 11/03/23 1515   Dressing Cleansing/Solutions Not Applicable 11/03/23 1515   Dressing Options Hydrofiber Silver;Silicone Adhesive Foam;Tubigrip 11/03/23 1515   Dressing Change/Treatment Frequency Monday, Wednesday, Friday, and As Needed 11/03/23 1515   Wound Team Following Weekly 11/03/23 1515   Non-staged Wound Description Full thickness 11/03/23 1515   Wound Length (cm) 1 cm 11/03/23 1515   Wound Width (cm) 2.2 cm 11/03/23 1515   Wound Depth (cm) 0.5 cm 11/03/23 1515   Wound Surface Area (cm^2) 2.2 cm^2 11/03/23 1515   Wound Volume (cm^3) 1.1 cm^3 11/03/23 1515   Post-Procedure Length (cm) 1 cm 10/20/23 1500   Post-Procedure Width (cm) 2.5 cm 10/20/23 1500   Post-Procedure Depth (cm) 0.3 cm 10/20/23 1500   Post-Procedure Surface Area (cm^2) 2.5 cm^2 10/20/23 1500   Post-Procedure Volume (cm^3) 0.75 cm^3 10/20/23 1500   Wound Healing % -267 11/03/23 1515   Tunneling (cm) 0 cm 11/03/23 1515   Undermining (cm) 0 cm 11/03/23 1515   Wound Odor None 11/03/23 1515   Exposed Structures Other (Comments) 10/25/23 1500   Number of days: 138       Wound 08/11/23 Full Thickness Wound Foot Lateral Left (Active)   Wound Image   11/03/23 1515   Site Assessment Crusted 11/03/23 1515   Periwound Assessment Dry;Scar tissue;Edema 11/03/23 1515   Margins Unattached edges;Attached edges 11/03/23 1515   Drainage Amount Small 11/03/23 1515   Drainage Description Serosanguineous 11/03/23 1515   Treatments Cleansed;Site care 11/03/23 1515   Wound Cleansing Foam Cleanser/Washcloth 11/03/23 1515   Periwound Protectant Barrier Paste 11/03/23 1515   Dressing Status Clean;Intact 08/11/23 1500   Dressing Changed Changed 11/03/23 1515   Dressing Cleansing/Solutions Not  Applicable 11/03/23 1515   Dressing Options Hydrofiber Silver;Silicone Adhesive Foam;Tubigrip 11/03/23 1515   Dressing Change/Treatment Frequency Every 72 hrs, and As Needed 11/03/23 1515   Wound Team Following Weekly 11/03/23 1515   Non-staged Wound Description Full thickness 11/03/23 1515   Wound Length (cm) 0.3 cm 10/25/23 1500   Wound Width (cm) 0.5 cm 10/25/23 1500   Wound Depth (cm) 0.4 cm 10/20/23 1500   Wound Surface Area (cm^2) 0.15 cm^2 10/25/23 1500   Wound Volume (cm^3) 0.14 cm^3 10/20/23 1500   Post-Procedure Length (cm) 0.5 cm 10/25/23 1500   Post-Procedure Width (cm) 0.6 cm 10/25/23 1500   Post-Procedure Depth (cm) 0.1 cm 10/25/23 1500   Post-Procedure Surface Area (cm^2) 0.3 cm^2 10/25/23 1500   Post-Procedure Volume (cm^3) 0.03 cm^3 10/25/23 1500   Wound Healing % -483 10/20/23 1500   Tunneling (cm) 0 cm 11/03/23 1515   Tunneling Clock Position of Wound 5 10/06/23 1500   Undermining (cm) 0 cm 11/03/23 1515   Undermining of Wound, 1st Location From 5 o'clock;To 7 o'clock 10/25/23 1500   Wound Odor None 11/03/23 1515   Exposed Structures None 11/03/23 1515   Number of days: 84       Wound 09/13/23 Pressure Injury Coccyx Superior (Active)   Wound Image    11/03/23 1515   Site Assessment Red;[place] 11/03/23 1515   Periwound Assessment Scar tissue 11/03/23 1515   Margins Unattached edges;Epibole (rolled edges) 11/03/23 1515   Drainage Amount Moderate 11/03/23 1515   Drainage Description Serosanguineous 11/03/23 1515   Treatments Cleansed;Provider debridement;Site care 11/03/23 1515   Wound Cleansing Normal Saline Irrigation 11/03/23 1515   Periwound Protectant Skin Protectant Wipes to Periwound;Barrier Paste 11/03/23 1515   Dressing Status Old drainage 11/03/23 1515   Dressing Changed Changed 11/03/23 1515   Dressing Cleansing/Solutions Not Applicable 11/03/23 1515   Dressing Options Hydrofiber Silver Strip;Hydrofiber Silver;Offloading Dressing - Sacral 11/03/23 1515   Dressing Change/Treatment Frequency  Monday, Wednesday, Friday, and As Needed 11/03/23 1515   Wound Team Following Weekly 11/03/23 1515   WOUND NURSE ONLY - Pressure Injury Stage 4 11/03/23 1515   Non-staged Wound Description Full thickness 10/25/23 1500   Wound Length (cm) 1.5 cm 11/03/23 1515   Wound Width (cm) 0.5 cm 11/03/23 1515   Wound Depth (cm) 1.5 cm 11/03/23 1515   Wound Surface Area (cm^2) 0.75 cm^2 11/03/23 1515   Wound Volume (cm^3) 1.125 cm^3 11/03/23 1515   Post-Procedure Length (cm) 1.9 cm 11/03/23 1515   Post-Procedure Width (cm) 0.6 cm 11/03/23 1515   Post-Procedure Depth (cm) 2 cm 11/03/23 1515   Post-Procedure Surface Area (cm^2) 1.14 cm^2 11/03/23 1515   Post-Procedure Volume (cm^3) 2.28 cm^3 11/03/23 1515   Wound Healing % -108 11/03/23 1515   Tunneling (cm) 3 cm 10/25/23 1500   Tunneling Clock Position of Wound 6 11/03/23 1515   Undermining (cm) 0 cm 11/03/23 1515   Undermining of Wound, 1st Location From 7 o'clock;To 9 o'clock 10/25/23 1500   Wound Odor None 11/03/23 1515   Exposed Structures None 11/03/23 1515   Number of days: 51       Wound 10/25/23 Pressure Injury Buttocks Right Inferior buttock/superior posterior thigh (Active)   Wound Image   11/03/23 1515   Site Assessment Red;[place] 11/03/23 1515   Periwound Assessment Dry 11/03/23 1515   Margins Attached edges 11/03/23 1515   Drainage Amount Scant 11/03/23 1515   Drainage Description Serosanguineous 11/03/23 1515   Treatments Cleansed;Site care 11/03/23 1515   Wound Cleansing Hypochlorus Acid 11/03/23 1515   Periwound Protectant Barrier Paste 11/03/23 1515   Dressing Cleansing/Solutions Not Applicable 11/03/23 1515   Dressing Options Hydrofiber Silver;Silicone Adhesive Foam 11/03/23 1515   Dressing Change/Treatment Frequency Monday, Wednesday, Friday, and As Needed 11/03/23 1515   Wound Team Following Weekly 11/03/23 1515   WOUND NURSE ONLY - Pressure Injury Stage 2 11/03/23 1515   Wound Length (cm) 0.4 cm 11/03/23 1515   Wound Width (cm) 0.4 cm 11/03/23 1515   Wound  Depth (cm) 0.1 cm 11/03/23 1515   Wound Surface Area (cm^2) 0.16 cm^2 11/03/23 1515   Wound Volume (cm^3) 0.016 cm^3 11/03/23 1515   Wound Healing % -100 11/03/23 1515   Tunneling (cm) 0 cm 11/03/23 1515   Undermining (cm) 0 cm 11/03/23 1515   Wound Odor None 11/03/23 1515   Exposed Structures None 11/03/23 1515   Number of days: 9       Wound 10/25/23 Full Thickness Wound Toe, 2nd Dorsal Left Cuticle (Active)   Wound Image    11/03/23 1515   Site Assessment Red;Yellow 11/03/23 1515   Periwound Assessment Blanchable erythema;Edema;Fragile 11/03/23 1515   Margins Attached edges 11/03/23 1515   Drainage Amount Small 11/03/23 1515   Drainage Description Serosanguineous 11/03/23 1515   Treatments Cleansed;Provider debridement;Silver nitrate;Site care 11/03/23 1515   Wound Cleansing Normal Saline Irrigation 11/03/23 1515   Periwound Protectant Skin Protectant Wipes to Periwound 11/03/23 1515   Dressing Cleansing/Solutions Not Applicable 11/03/23 1515   Dressing Options Hydrofiber Silver;Nonadhesive Foam;Hypafix Tape 11/03/23 1515   Dressing Change/Treatment Frequency Monday, Wednesday, Friday, and As Needed 11/03/23 1515   Wound Team Following Weekly 11/03/23 1515   Non-staged Wound Description Full thickness 11/03/23 1515   Wound Length (cm) 0.1 cm 11/03/23 1515   Wound Width (cm) 0.5 cm 11/03/23 1515   Wound Depth (cm) 0.1 cm 11/03/23 1515   Wound Surface Area (cm^2) 0.05 cm^2 11/03/23 1515   Wound Volume (cm^3) 0.005 cm^3 11/03/23 1515   Post-Procedure Length (cm) 1 cm 11/03/23 1515   Post-Procedure Width (cm) 1.5 cm 11/03/23 1515   Post-Procedure Depth (cm) 0.1 cm 11/03/23 1515   Post-Procedure Surface Area (cm^2) 1.5 cm^2 11/03/23 1515   Post-Procedure Volume (cm^3) 0.15 cm^3 11/03/23 1515   Wound Healing % 17 11/03/23 1515   Tunneling (cm) 0 cm 11/03/23 1515   Undermining (cm) 0 cm 11/03/23 1515   Wound Odor None 11/03/23 1515   Exposed Structures None 11/03/23 1515   Number of days: 9       PROCEDURE: Excisional  debridement of sacral / coccygeal pressure injury-wounds x 2  -2% viscous lidocaine applied topically to wound beds for approximately 5 minutes prior to debridement  -Curette used to debride wound bed.  Excisional debridement was performed to remove devitalized tissue until healthy, bleeding tissue was visualized.  Total wound area debrided approximately 10.0 cm² including into the wound tunnel.  Tissue debrided into the muscle / fascia layer.    -Bleeding controlled with manual pressure.    -Wound care completed by wound RN, refer to flowsheet  -Patient tolerated the procedure well, without c/o pain or discomfort.      PROCEDURE: Removal of left second toenail  - no need for topical anesthesia, patient's foot is insensate  -Scissors and forceps used to lift and excise nail from nail bed  -Wound care completed by wound RN, refer to flowsheet  -Patient tolerated the procedure well, without c/o pain or discomfort.      No debridement of left heel, left medial lower leg, or left posterior leg wounds required today.    BIOLOGIC LOG  5/20/2022-first application.  PRODUCT: EpiCord 2 x 3 cm . PRODUCT #[id number]; EXPIRES: 2026-12-01 5/27/2022- second application.  PRODUCT: EpiCordEX 2 x 3 cm . PRODUCT #EX [id number]; EXPIRES: 2026-09-01    6/3/2022- third application.  PRODUCT: EpiCordEX 2 x 3 cm . PRODUCT #EX [id number]; EXPIRES: 2026-10-01    6/10/2022- fourth application.  PRODUCT: EpiCordEX 2 x 3 cm . PRODUCT #EX [id number]; EXPIRES: 2026-09-01 6/17/2022- fifth application.  PRODUCT: EpiCordEX 2 x 3 cm . PRODUCT #EX [id number]; EXPIRES: 2027-02-01 6/24/2022- sixth application.  PRODUCT: EpiCordEX 2 x 3 cm . PRODUCT #EX [id number]; EXPIRES: 2027-02-01 07/01/22: Seventh application.  Product: Epicort Dx 2.0 x 3.0 cm reorder number [id number]; product number [id number]; expires 2027-02-01 7/22/2022- eighth application.  PRODUCT: EpiCord 2 x 3 cm, reorder #[id number]. PRODUCT  "#[id number]; EXPIRES: 2027-02-01      Pertinent Labs and Diagnostics:    Labs:     A1c: No results found for: \"HBA1C\"     [facility] results, 7/1/2022 (under media tab)    CRP 13    ESR 31      IMAGING:     10/3/2022-CT of pelvis with contrast  IMPRESSION:     1.  Posterior soft tissue sacral wound and 1.5 x 3.7 cm abscess.   2.  Progressive destruction of the distal sacrum and proximal coccyx. Sclerosis and irregularity of the distal sacrum consistent with osteomyelitis.   3.  Old wound within the soft tissues posterior to the distal left SI joint with possible fistulous communication.    10/3/2022-CT of tib-fib with and without contrast, with reconstruction  IMPRESSION:     1.  Old fractures of the left tibia and fibula with extensive callus formation and bony bridging as well as extensive dystrophic calcifications. Similar to previous.   2.  Distal medial soft tissue wounds with ill-defined superficial in the soft tissue thickening and fluid collections suggestive of phlegmon. No organized abscess identified.   3.  No definite osteomyelitis.   4.  Arthritic changes.        VASCULAR STUDIES: No results found.    LAST  WOUND CULTURE:   Lab Results   Component Value Date/Time    CULTRSULT (A) 09/13/2023 04:10 PM     Growth noted after further incubation, see below for  organism identification.  Light growth usual skin ade.      CULTRSULT (A) 09/13/2023 04:10 PM     Proteus mirabilis ESBL  Light growth  Extended Spectrum Beta-lactamase (ESBL) isolated.  ESBL's may be clinically resistant to therapy with  Penicillins,Cephalosporins or Aztreonam despite  apparent in vitro susceptibility to some of these agents.  The patient requires contact isolation.  Please contact pharmacy or an Infectious Disease Specialist  if you have any questions about appropriate therapy.      CULTRSULT Klebsiella pneumoniae  Light growth   (A) 09/13/2023 04:10 PM    CULTRSULT (A) 09/13/2023 04:10 PM     Methicillin Resistant " Staphylococcus aureus  Light growth        PATHOLOGY  2/17/2023-bone fragment extracted from left lower extremity wound\\  FINAL DIAGNOSIS:     A. Left leg bone fragment at base of chronic wound:          Extensively degenerated fibrocartilaginous tissue with a rim of           fibrinopurulent debris          Correlate with culture findings            Comment: While no residual intact bone is identified, these           findings are suggestive of adjacent osteomyelitis.      ASSESSMENT AND PLAN:     1. Sacral decubitus ulcer, stage IV ([facility])  Comments: Ulcer first noted in early April 2022 as small open area which quickly enlarged.  This ulcer is present distal from previous sacral ulcer which healed after surgery in January.  Patient has history of flap reconstruction x2 to this area.  CT shows progressive destruction of distal sacrum and proximal coccyx.    11/3/2023: Area of both wounds has decreased since last assessment, no evidence of infection  - Excisional debridement was performed in clinic, medically necessary to promote wound healing.   -Patient to return to clinic weekly for assessment and debridement  -We had considered starting VAC to these wounds.  However, patient has multiple concerns.  Given current progress, will hold off on this.  This can be reconsidered if wounds deteriorate.  -Home health to change dressing 2 times per week   -Patient does spend a lot of time up in his wheelchair, and wishes to continue to do so for his quality of life.  He lives independently, drives, and is involved with family activities.  He does have an appropriate pressure-relief cushion and is very well versed on pressure relieving techniques.  - Patient does not currently have follow up with Dr. Saini. If wound deteriorates or fails to improve can consider re-establishing with Dr. Saini for evaluation for coverage options. Patient does not want to pursue at this time and is happy with current conservative approach  even with wound worsening.  - Known OM that has already been treated. No utility of Abx unless gross soft tissue infection which does not appear to be present today.    Wound care: Collagen, Hydrofiber silver strip, hydrofiber silver, Mepilex    2. Postoperative wound dehiscence, subsequent encounter  3. Osteomyelitis of left lower extremity  Comments: On 4/26/2022 patient underwent irrigation and debridement of multiple compartments of the left lower extremity states for pressure injury, with bone excision, and complex closure of chronic wound using biologic skin substitute.  During postop visit in surgeons office on 5/11, surgical site was noted to be dehisced.  Patient was referred to wound clinic for management.    11/3/2023: Left medial lower leg presents with 2 small open areas, poor tissue quality.  This wound tends to wax and wane.    -No excisional debridement of this wound today  -Patient to return to clinic weekly for assessment and debridement as needed  -Home health to change dressing 1-2 times per week in between clinic visits OM.  -Suspect underlying OM, however patient does not wish to consider surgical options at this time nor does his surgeon wish to do any further surgery to this leg. 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 There is no gross soft tissue infection and repeated Abx treatment without source control is not indicated.    -We will assess these wounds each clinic visit  -Patient to be mindful of offloading when supine, should assure that his leg is not rotating medially  Wound care: Hydrofiber silver, silicone adhesive foam, tubigrip    4. Deep tissue injury  5. Pressure injury of left foot, stage IV  Comments: DTI to posterior left lower leg first observed in clinic 7/29/2022.  Patient does not know how it started, had been wearing heel float boot consistently.  Evolved into stage IV pressure injury    11/3/2023:: This wound has again healed, though covered  with friable epithelium.  High risk for recurrence  -No need for debridement today  -Patient is well versed on offloading techniques  - Patient to be mindful of offloading his foot and posterior leg       Wound care: Foot wound-  Puracyn Gel, Silver Hydrofiber to manage exudate and bioburden, foam cover dressing, Tubigrip D to manage edema    6. Pressure injury of left heel, stage 3 ([facility])  Comments: First noted in clinic on 10/21/2022, originally noted to be stage II    10/25/2023: Skin intact though friable.  -We will continue to monitor wound each visit, initiate treatment as indicated  - Patient continue wearing heel float boots at all times  - Heel does not appear to contact any solid surfaces while in wheelchair   Wound Care: Heel Mepilex to reduce pressure and friction    7. Quadriplegia, C5-C7, incomplete ([facility])  Comments: Complicating factor.  Impaired mobility and sensation  -Patient is still spending 7-8 hours/day up in his wheelchair, though he does have appropriate offloading cushion, and knows to reposition frequently.  Wears heel float boots bilaterally at all times    8.  Injury of toenail of left foot, subsequent encounter  9.  Infected wound    11/3/2023: Toenail is more loose today, at risk for traumatic avulsion.  Erythema and edema have decreased  -Rx for Augmentin sent to patient's pharmacy last visit.  Patient tolerating well  -Toenail removed in clinic today          Please note that this note may have been created using voice recognition software. I have worked with technical experts from [employer] to optimize the interface.  I have made every reasonable attempt to correct obvious errors, but there may be errors of grammar and possibly content that I did not discover before finalizing the note.

## 2023-11-10 ENCOUNTER — OFFICE VISIT (OUTPATIENT)
Dept: WOUND CARE | Facility: MEDICAL CENTER | Age: 73
End: 2023-11-10
Attending: INTERNAL MEDICINE
Payer: MEDICARE

## 2023-11-10 VITALS
TEMPERATURE: 97.3 F | HEART RATE: 50 BPM | SYSTOLIC BLOOD PRESSURE: 144 MMHG | DIASTOLIC BLOOD PRESSURE: 77 MMHG | RESPIRATION RATE: 20 BRPM | OXYGEN SATURATION: 99 %

## 2023-11-10 DIAGNOSIS — T14.8XXA DEEP TISSUE INJURY: ICD-10-CM

## 2023-11-10 DIAGNOSIS — L89.894 PRESSURE INJURY OF LEFT FOOT, STAGE 4 (HCC): ICD-10-CM

## 2023-11-10 DIAGNOSIS — L89.154 SACRAL DECUBITUS ULCER, STAGE IV (HCC): ICD-10-CM

## 2023-11-10 DIAGNOSIS — G82.53 QUADRIPLEGIA, C5-C7, COMPLETE (HCC): ICD-10-CM

## 2023-11-10 DIAGNOSIS — T81.31XD POSTOPERATIVE WOUND DEHISCENCE, SUBSEQUENT ENCOUNTER: ICD-10-CM

## 2023-11-10 DIAGNOSIS — S99.922D INJURY OF TOENAIL OF LEFT FOOT, SUBSEQUENT ENCOUNTER: ICD-10-CM

## 2023-11-10 DIAGNOSIS — L89.623 PRESSURE INJURY OF LEFT HEEL, STAGE 3 (HCC): ICD-10-CM

## 2023-11-10 DIAGNOSIS — L08.9 INFECTED WOUND: ICD-10-CM

## 2023-11-10 DIAGNOSIS — L89.313 PRESSURE INJURY OF RIGHT ISCHIUM, STAGE 3 (HCC): ICD-10-CM

## 2023-11-10 DIAGNOSIS — T14.8XXA INFECTED WOUND: ICD-10-CM

## 2023-11-10 DIAGNOSIS — M86.9 OSTEOMYELITIS OF LEFT LOWER EXTREMITY (HCC): ICD-10-CM

## 2023-11-10 PROCEDURE — 3078F DIAST BP <80 MM HG: CPT | Performed by: NURSE PRACTITIONER

## 2023-11-10 PROCEDURE — 11043 DBRDMT MUSC&/FSCA 1ST 20/<: CPT | Performed by: NURSE PRACTITIONER

## 2023-11-10 PROCEDURE — 3077F SYST BP >= 140 MM HG: CPT | Performed by: NURSE PRACTITIONER

## 2023-11-10 PROCEDURE — 11043 DBRDMT MUSC&/FSCA 1ST 20/<: CPT

## 2023-11-10 NOTE — PROGRESS NOTES
Provider Encounter- Pressure Injury        HISTORY OF PRESENT ILLNESS  Wound History:    START OF CARE IN CLINIC: 6/23/2023 (return to clinic after hospitalization)    REFERRING PROVIDER: Sahara Mendez      WOUNDS- Pressure Injury, Sacrococcygeal, stage IV                                                             Surgical wound dehiscence, left medial lower leg-status post surgical I&D of stage IV pressure injury and           biologic application                    Pressure injury, DTI, left posterior lower leg-first observed in clinic 7/29/2022       Pressure injury stage III L heel - first noted 10/21     Right 2nd toe avulsion - first noted 10/21     Pressure injury to right inferior buttock/ischium-first observed in clinic 10/25/2023      HISTORY: Patient with history of incomplete quadriplegia referred to [facility] for treatment of a stage IV pressure injury.  He has a history of previous pressure injuries to this area, and underwent muscle flaps in 2019, and then again in 2020.  He was seen in the wound clinic in November 2021 for an ulcer proximal from his current ulcer, and pressure injuries to his left posterior lower leg and left heel.  At that time, it was discovered that the patient had retained VAC foam embedded in the wound bed of the sacral wound.  Attempts were made to get him back to his plastic surgeon, though unsuccessful.  In January he underwent surgical removal of VAC sponge along with excisional debridement of his sacral wound by Dr. Chaves.  After the surgery, his wound went on to heal without incident.   In early April 2022, his home health nurse noted a new sacral ulcer, below the previous ulcer which quickly tripled in size over the following weeks.  The ulcer to his left medial lower leg had also deteriorated, with bone visible at the base..  He was hospitalized from 4/22 until 4/27/2022 and underwent surgery with Dr. Raman on 4/26 for irrigation and debridement of multiple compartments of  the left lower extremity, bone excision, and complex closure of chronic wound using biologic skin substitute.   His sacrococcygeal wound was not surgically addressed during this admission.  He was discharged back to his group home, with home health, and referral to outpatient wound clinic for his sacral wound.  He was instructed to follow-up with his surgeon for his lower leg wound.       Postoperatively, the left medial lower leg incision dehisced.  He was seen by his surgeon at [facility] on 5/11.  The surgeon opted to leave remaining sutures in place, and refer him to the wound clinic for treatment of this wound.   Treatment of this wound was initiated in clinic on 5/12.  During this visit was also noted that his heel DTI had resolved, but that he had a new pressure injury to his left posterior lower extremity.     A new pressure injury was noted to patient's right upper buttock/lower back on 5/20/2022.  Wound was linear in shape, skin discolored but intact.     Abrasion noted to left anterior lower leg.  First observed in clinic on 7/22/2022.  Patient states he bumped his leg into his food tray.     Small DTI noted to patient's left lateral lower leg on 7/29/2022.  Skin intact but discolored.     Large area of deep tissue injury noted to patient's left exterior lower leg.  Patient denied any trauma to this area.  Skin intact.  Wound documented.    1/27/2023: Patient was admitted to [facility] [place], [place] from 1/23-1/25/2023 with gross hematuria. He underwent RICHARD which showed watchman device was in place and he was taken off of Xarelto. While hospitalized wound team was consulted. He was referred back to [facility] and home health upon discharge.    Patient was hospitalized at [facility] for pyelonephritis from 2/26 until 3/2/2023, admitted for fever and general malaise.  He was admitted and initially started on linezolid and meropenem for suspected UTI and history of multidrug-resistant organisms.  Urine cultures were negative. ID was consulted,  recommended CT of chest and abdomen,which were negative for acute findings. However, he was treated with 5 days total course of antibiotics for suspected UTI, and symptoms completely resolved.  During this admission, the inpatient wound team was consulted for treatment of his sacral and lower leg wounds.  A wound culture was taken from his left heel pressure ulcer, negative.  Once stabilized, he was discharged home and referred back to [facility] to resume treatment of his wounds.    Pertinent Medical History: Incomplete quadriplegia, history of stage IV pressure injuries, history of flap procedures to sacral pressure injuries, osteomyelitis, obesity, colostomy in place   Contributing factors: Immobility and Obesity, impaired sensation    Personal support: Attendant-staff at skilled nursing and home health nursing    TOBACCO USE:   Former smoker, quit in 1977.  Never used smokeless tobacco    Patient's problem list, allergies, and current medications reviewed and updated in Epic    Interval History:  Interval History thinned 7/29/2022.  Please see previous notes for complete interval history.     Interval History thinned 1/27/2023. Please see previous note for complete interval history.    Interval History thinned 3/3/2023.  Please see previous notes for complete interval history.      Interval History thinned 8/4/2023.  Please see previous notes for complete interval history.      7/28/2023: Clinic visit with Steve Hodges MD. Patient reports being in normal state of health. Denies any current issues. His lower wounds are largely unchanged. Sacrum continues to enlarge, he reports that he has been up in chair more frequently this week. Patient counseled on risks of enlarging pressure injury including risk that wound may progress, known OM may worsen or expand including causing illness. He is aware of risks and possible other treatment options, he would like to proceed with current treatment.    8/4/2023 : Clinic visit with  ADILSON Arthur FNP-BC, LILIYA VALERIO.  Anjum states he is feeling well overall.  Left medial lower leg wound has decreased in area significantly.  However, a new area has opened just below sacral ulcer,  from an ulcer by thin skin bridge.  Patient denies any changes to his usual activity.  His left heel wound remains healed, however he does have some slightly denuded skin to the heel.  He was seen by his podiatrist earlier this week, dressing was applied, possibly causing slight maceration.    8/11/2023 : Clinic visit with ADILSON Arthur FNP-BC, LILIYA VALERIO.   Anjum continues to feel well.  Unfortunately, left plantar foot lateral foot wound has reopened slightly.  The skin to his heel is slightly macerated, home health is using smaller heel foam dressing .  Sacrum measures about the same.  There is some friable red tissue along superior wound edge that was treated with AgNO3 today.    8/18/2023 : Clinic visit with ADILSON Arthur FNP-BC, LILIYA VALERIO.   Anjum states he is feeling well.  The plantar foot wound has deteriorated somewhat, area is increased.  Sacral wound is about the same.  Left lower leg wounds are both smaller, though with friable tissue.   Anjum's wounds have been essentially stable for a long time.  We discussed decreasing frequency of clinic visits to every 2 weeks.  Patient is agreeable to this plan.  He understands that we can resume weekly appointments if his wounds deteriorate.    9/1/2023 : Clinic visit with ADILSON Arthur FNP-BC, IMAN, LILIYA.   Anjum states he is in his usual state of health.  Unfortunately, his left heel wound has reopened, and his plantar foot wound has deteriorated.  The medial leg wound has again nearly closed, though tissue is boggy and will likely reopen.  His sacral wound continues to progress, though very slowly.    9/13/2023: Clinic visit with Steve Hodges MD. Patient reports feeling well. Left plantar lateral foot wound is larger with  increased drainage, wound tract that probes towards dorsal foot with some erythema and edema. Concerning for infection. No fluctuance. Patient has been on Augmentin, culture was cancelled previously as he was taking Abx before culture could be obtained. Due to worsening wound today, will add doxycycline to cover MRSA and obtain culture today, though may be sterile as has been on Abx. Sacral pressure injury with breakdown of previous wound site and larger. Patient is not overly concerned and does not want any surgical intervention.    9/18/2023: Clinic visit with Steve Hodges MD. Patient reports feeling in normal state of health. He denies any signs or symptoms of infection currently. Patient left foot wound appears improved. Patient remains on Augmentin. Wound culture was concerning for ESBL Proteus, will discuss case with ID as there are no simple oral options for coverage    9/29/2023 : Clinic visit with ADILSON Arthur, HOLDEN, GEETHA VALERIO   Anjum states he is feeling okay.  His plantar foot wound is again progressing, area has decreased.  The leg wound is nearly resolved, though poor tissue quality as previously noted.  He is left heel wound and left posterior leg wounds are also again resolved.  Unfortunately, his sacrococcygeal wound is larger, now communicates with superior wound.    10/6/2023 : Clinic visit with ADILSON Arthur FNP-BC, LILIYA VALERIO.   Anjum continues to feel well.  His left lower extremity wounds  are unchanged from last visit, left foot wound continues to progress.  Superior sacral wound measures slightly larger, inferior wound slightly smaller.  Patient is still not interested in VAC to heal these wounds.      10/13/2023 : Clinic visit with ADILSON Arthur FNP-BC, GEETHA VALERIO states he is feeling well.  His lower extremity wounds and foot wound are currently progressing well.  His sacral/coccyx wounds are not progressing.  Discussed restarting VAC.  However, would like  to excise skin bridge between the 2 wounds prior.  Patient is agreeable to this idea, however states he has a friend coming in from out of town who will be here for approximately 10 days.  Would like to postpone procedure and restarting of VAC for approximately 2 weeks.    10/20/2023 : Clinic visit with ADILSON Arthur, HOLDEN, IMAN, LILIYA.   Continues to feel well.  His plantar foot wound continues to progress.  Lateral leg wound is a bit larger today, though has tended to wax and wane.  His superior coccyx ulcer actually measures a bit smaller, and depth has decreased, however tissue somewhat boggy.  Inferior coccyx wound measures bit larger.  He still going to consider VAC to his coccyx wound, however wants to wait a few weeks as he has a friend coming into town.  We will revisit this next week.    10/25/2023 : Clinic visit with ADILSON Arthur, HOLDEN, IMAN, LILIYA.   Anjum is in his usual state of health, states he is feeling well overall.  He does have some redness and swelling to his left second toe, toenail is loose.  He states he believes he may have bumped into something, but is not sure when or what.  He does not appear to be a significant wound.  I sent Rx for Augmentin to cover possible infection.  We will monitor this wound each visit.  May need to remove toenail at some point.   His foot wound is again nearly resolved, sacral wounds are about the same.    11/3/2023 : Clinic visit with ADILSON Arthur, HOLDEN, IMAN, LILIYA.   Anjum is feeling well today.  His plantar foot wound is again resolved.  Medial lower leg wound continues to wax and wane with poor tissue quality.  His heel and posterior leg wounds both present with intact skin, though friable.   His left second toenail is loose, first noted last visit.  Toenail removed in clinic today   Both sacral wounds have decreased in area.  Anjum had some concerns about starting VAC.  Given the current progress of these wounds, I do not feel VAC is  necessary.  This can be reconsidered if these wounds stall or deteriorate    11/10/2023 : Clinic visit with ADILSON Arthur, FNP-BC, CWOCN, CFCN.   Anjum continues to feel well.  His plantar foot wound remains resolved, though presents with purple/blue discoloration-bears watching.  Medial leg wound about the same, small open areas with poor tissue quality.  Skin to left heel remains intact but fragile.  Sacral wounds are currently progressing well, though still communicating via tract.  Nailbed of second toe was covered with dry eschar, site of toenail removal last week, appears to be stable.  Small wound to right ischium is slightly worse today.  I checked patient's wheelchair cushion, appears to be in good repair with no deflated areas.      REVIEW OF SYSTEMS:   Unchanged from previous wound clinic assessment on 11/3/2023, except as noted in interval history above     PHYSICAL EXAMINATION:   BP (!) 144/77   Pulse (!) 50   Temp 36.3 °C (97.3 °F)   Resp 20   SpO2 99%   Physical Exam  Constitutional:       Appearance: He is obese.   Cardiovascular:      Rate and Rhythm: Normal rate.   Pulmonary:      Effort: Pulmonary effort is normal.   Abdominal:      Comments: Colostomy left lower quadrant   Genitourinary:     Comments: Suprapubic catheter to down drain   Skin:     Comments: Stage IV coccygeal pressure injury -2 wounds, superior and inferior.  Both wounds have decreased in area, still communicate via tract, moderate serosanguineous drainage, no evidence of infection      Full-thickness wound to left medial lower leg -open wounds with area that continues to wax and wane, poor tissue quality, minimal to moderate serosanguineous drainage, no evidence of infection    Stage III pressure injury left posterior heel -skin remains intact though friable.  High risk for recurrence    Stage IV pressure injury plantar foot -skin intact though with purple/blue discoloration, high risk for recurrence    Right ischium  pressure injury, shallow stage III-wound area and depth have increased since last assessment, no slough to wound bed, minimal to moderate serosanguineous drainage, no evidence of infection    Stage III pressure injury to posterior left lower leg, recurring-skin remains intact but discolored and friable.    Left second toe nailbed-covered with stable dry eschar, no drainage, no periwound erythema or induration     Neurological:      Mental Status: He is alert and oriented to person, place, and time.   Psychiatric:         Mood and Affect: Mood normal.         WOUND ASSESSMENT  Wound 06/18/23 Pressure Injury Coccyx Stage IV Inferior wound (Active)   Wound Image    11/10/23 1400   Site Assessment Red 11/10/23 1400   Periwound Assessment Scar tissue 11/10/23 1400   Margins Epibole (rolled edges);Unattached edges 11/10/23 1400   Drainage Amount Moderate 11/10/23 1400   Drainage Description Serosanguineous 11/10/23 1400   Treatments Cleansed;Provider debridement;Site care 11/10/23 1400   Wound Cleansing Hypochlorus Acid 11/10/23 1400   Periwound Protectant Skin Protectant Wipes to Periwound;Barrier Paste 11/10/23 1400   Dressing Changed Changed 11/10/23 1400   Dressing Cleansing/Solutions Not Applicable 11/10/23 1400   Dressing Options Hydrofiber Silver Strip;Hydrofiber Silver;Offloading Dressing - Sacral 11/10/23 1400   Dressing Change/Treatment Frequency Monday, Wednesday, Friday, and As Needed 11/10/23 1400   Wound Team Following Weekly 11/10/23 1400   WOUND NURSE ONLY - Pressure Injury Stage 4 11/10/23 1400   Wound Length (cm) 4.5 cm 11/10/23 1400   Wound Width (cm) 0.7 cm 11/10/23 1400   Wound Depth (cm) 0.8 cm 11/10/23 1400   Wound Surface Area (cm^2) 3.15 cm^2 11/10/23 1400   Wound Volume (cm^3) 2.52 cm^3 11/10/23 1400   Post-Procedure Length (cm) 4.5 cm 11/10/23 1400   Post-Procedure Width (cm) 0.7 cm 11/10/23 1400   Post-Procedure Depth (cm) 0.8 cm 11/10/23 1400   Post-Procedure Surface Area (cm^2) 3.15 cm^2  11/10/23 1400   Post-Procedure Volume (cm^3) 2.52 cm^3 11/10/23 1400   Wound Healing % 31 11/10/23 1400   Tunneling (cm) 3 cm 11/10/23 1400   Tunneling Clock Position of Wound 12 11/10/23 1400   Undermining (cm) 0.8 cm 11/03/23 1515   Undermining of Wound, 1st Location From 6 o'clock;To 7 o'clock 11/03/23 1515   Undermining (cm) - 2nd location 3.1 cm 09/18/23 1200   Undermining of Wound, 2nd Location From 11 o'clock;From 1 o'clock 09/18/23 1200   Wound Odor None 11/10/23 1400   Exposed Structures Other (Comments) 11/10/23 1400   Number of days: 145       Wound 06/18/23 Full Thickness Wound Leg Medial Left (Active)   Wound Image   11/10/23 1400   Site Assessment Red;Boggy 11/10/23 1400   Periwound Assessment Red;Purple;Blanchable erythema;Edema 11/10/23 1400   Margins Attached edges 11/10/23 1400   Drainage Amount Moderate 11/10/23 1400   Drainage Description Serosanguineous 11/10/23 1400   Treatments Cleansed;Site care 11/10/23 1400   Wound Cleansing Foam Cleanser/Washcloth 11/10/23 1400   Periwound Protectant No-sting Skin Prep;Barrier Paste 11/10/23 1400   Dressing Changed Changed 11/10/23 1400   Dressing Cleansing/Solutions Not Applicable 11/10/23 1400   Dressing Options Hydrofiber Silver;Silicone Adhesive Foam;Tubigrip 11/10/23 1400   Dressing Change/Treatment Frequency Monday, Wednesday, Friday, and As Needed 11/10/23 1400   Wound Team Following Weekly 11/10/23 1400   Non-staged Wound Description Full thickness 11/10/23 1400   Wound Length (cm) 1 cm 11/10/23 1400   Wound Width (cm) 2.1 cm 11/10/23 1400   Wound Depth (cm) 0.4 cm 11/10/23 1400   Wound Surface Area (cm^2) 2.1 cm^2 11/10/23 1400   Wound Volume (cm^3) 0.84 cm^3 11/10/23 1400   Post-Procedure Length (cm) 1 cm 10/20/23 1500   Post-Procedure Width (cm) 2.5 cm 10/20/23 1500   Post-Procedure Depth (cm) 0.3 cm 10/20/23 1500   Post-Procedure Surface Area (cm^2) 2.5 cm^2 10/20/23 1500   Post-Procedure Volume (cm^3) 0.75 cm^3 10/20/23 1500   Wound Healing %  -180 11/10/23 1400   Tunneling (cm) 0 cm 11/10/23 1400   Undermining (cm) 0 cm 11/10/23 1400   Wound Odor None 11/10/23 1400   Exposed Structures Other (Comments) 10/25/23 1500   Number of days: 145       Wound 08/11/23 Full Thickness Wound Foot Lateral Left (Active)   Wound Image   11/10/23 1400   Site Assessment Light Purple;Purple;Epithelialization 11/10/23 1400   Periwound Assessment Dry;Scar tissue;Edema 11/10/23 1400   Margins Attached edges 11/10/23 1400   Drainage Amount Scant 11/10/23 1400   Drainage Description Serosanguineous 11/10/23 1400   Treatments Cleansed;Site care 11/10/23 1400   Wound Cleansing Foam Cleanser/Washcloth 11/10/23 1400   Periwound Protectant Barrier Paste 11/10/23 1400   Dressing Status Clean;Intact 08/11/23 1500   Dressing Changed Changed 11/10/23 1400   Dressing Cleansing/Solutions Not Applicable 11/10/23 1400   Dressing Options Hydrofiber Silver;Silicone Adhesive Foam;Tubigrip 11/10/23 1400   Dressing Change/Treatment Frequency Every 72 hrs, and As Needed 11/10/23 1400   Wound Team Following Weekly 11/10/23 1400   Non-staged Wound Description Full thickness 11/10/23 1400   Wound Length (cm) 0.1 cm 11/10/23 1400   Wound Width (cm) 0.1 cm 11/10/23 1400   Wound Depth (cm) 0.4 cm 10/20/23 1500   Wound Surface Area (cm^2) 0.01 cm^2 11/10/23 1400   Wound Volume (cm^3) 0.14 cm^3 10/20/23 1500   Post-Procedure Length (cm) 0.5 cm 10/25/23 1500   Post-Procedure Width (cm) 0.6 cm 10/25/23 1500   Post-Procedure Depth (cm) 0.1 cm 10/25/23 1500   Post-Procedure Surface Area (cm^2) 0.3 cm^2 10/25/23 1500   Post-Procedure Volume (cm^3) 0.03 cm^3 10/25/23 1500   Wound Healing % -483 10/20/23 1500   Tunneling (cm) 0 cm 11/10/23 1400   Tunneling Clock Position of Wound 5 10/06/23 1500   Undermining (cm) 0 cm 11/10/23 1400   Undermining of Wound, 1st Location From 5 o'clock;To 7 o'clock 10/25/23 1500   Wound Odor None 11/10/23 1400   Exposed Structures None 11/10/23 1400   Number of days: 91        Wound 09/13/23 Pressure Injury Coccyx Superior (Active)   Wound Image    11/10/23 1400   Site Assessment Pink;Red 11/10/23 1400   Periwound Assessment Scar tissue 11/10/23 1400   Margins Unattached edges;Epibole (rolled edges) 11/10/23 1400   Drainage Amount Moderate 11/10/23 1400   Drainage Description Serosanguineous 11/10/23 1400   Treatments Cleansed;Provider debridement;Site care 11/10/23 1400   Wound Cleansing Hypochlorus Acid 11/10/23 1400   Periwound Protectant Skin Protectant Wipes to Periwound;Barrier Paste 11/10/23 1400   Dressing Status Old drainage 11/03/23 1515   Dressing Changed Changed 11/10/23 1400   Dressing Cleansing/Solutions Not Applicable 11/10/23 1400   Dressing Options Hydrofiber Silver Strip;Hydrofiber Silver;Offloading Dressing - Sacral 11/10/23 1400   Dressing Change/Treatment Frequency Monday, Wednesday, Friday, and As Needed 11/10/23 1400   Wound Team Following Weekly 11/10/23 1400   WOUND NURSE ONLY - Pressure Injury Stage 4 11/10/23 1400   Non-staged Wound Description Full thickness 10/25/23 1500   Wound Length (cm) 2.5 cm 11/10/23 1400   Wound Width (cm) 0.5 cm 11/10/23 1400   Wound Depth (cm) 1 cm 11/10/23 1400   Wound Surface Area (cm^2) 1.25 cm^2 11/10/23 1400   Wound Volume (cm^3) 1.25 cm^3 11/10/23 1400   Post-Procedure Length (cm) 2.2 cm 11/10/23 1400   Post-Procedure Width (cm) 0.5 cm 11/10/23 1400   Post-Procedure Depth (cm) 1 cm 11/10/23 1400   Post-Procedure Surface Area (cm^2) 1.1 cm^2 11/10/23 1400   Post-Procedure Volume (cm^3) 1.1 cm^3 11/10/23 1400   Wound Healing % -131 11/10/23 1400   Tunneling (cm) 2 cm 11/10/23 1400   Tunneling Clock Position of Wound 6 11/10/23 1400   Undermining (cm) 0 cm 11/10/23 1400   Undermining of Wound, 1st Location From 7 o'clock;To 9 o'clock 10/25/23 1500   Wound Odor None 11/10/23 1400   Exposed Structures None 11/10/23 1400   Number of days: 58       Wound 10/25/23 Pressure Injury Buttocks Right Inferior buttock/superior posterior  thigh (Active)   Wound Image   11/10/23 1400   Site Assessment Pink;Red 11/10/23 1400   Periwound Assessment Dry 11/10/23 1400   Margins Attached edges 11/10/23 1400   Drainage Amount Scant 11/10/23 1400   Drainage Description Serosanguineous 11/10/23 1400   Treatments Cleansed;Site care 11/10/23 1400   Wound Cleansing Foam Cleanser/Washcloth 11/10/23 1400   Periwound Protectant Skin Protectant Wipes to Periwound;Barrier Paste 11/10/23 1400   Dressing Cleansing/Solutions Not Applicable 11/10/23 1400   Dressing Options Hydrofiber Silver;Offloading Dressing - Sacral 11/10/23 1400   Dressing Change/Treatment Frequency Monday, Wednesday, Friday, and As Needed 11/10/23 1400   Wound Team Following Weekly 11/10/23 1400   WOUND NURSE ONLY - Pressure Injury Stage 2 11/10/23 1400   Wound Length (cm) 0.2 cm 11/10/23 1400   Wound Width (cm) 0.2 cm 11/10/23 1400   Wound Depth (cm) 0.1 cm 11/03/23 1515   Wound Surface Area (cm^2) 0.04 cm^2 11/10/23 1400   Wound Volume (cm^3) 0.016 cm^3 11/03/23 1515   Wound Healing % -100 11/03/23 1515   Tunneling (cm) 0 cm 11/10/23 1400   Undermining (cm) 0 cm 11/10/23 1400   Wound Odor None 11/10/23 1400   Exposed Structures None 11/10/23 1400   Number of days: 16       Wound 10/25/23 Full Thickness Wound Toe, 2nd Dorsal Left Cuticle (Active)   Wound Image   11/10/23 1400   Site Assessment Black;Brown;Dry 11/10/23 1400   Periwound Assessment Dry;Intact 11/10/23 1400   Margins Attached edges 11/10/23 1400   Drainage Amount None 11/10/23 1400   Drainage Description Serosanguineous 11/03/23 1515   Treatments Cleansed;Site care 11/10/23 1400   Wound Cleansing Hypochlorus Acid 11/10/23 1400   Periwound Protectant Skin Protectant Wipes to Periwound 11/10/23 1400   Dressing Cleansing/Solutions 3% Betadine 11/10/23 1400   Dressing Options Dry Gauze;Hypafix Tape 11/10/23 1400   Dressing Change/Treatment Frequency Monday, Wednesday, Friday, and As Needed 11/10/23 1400   Wound Team Following Weekly  11/10/23 1400   Non-staged Wound Description Full thickness 11/10/23 1400   Wound Length (cm) 0.1 cm 11/03/23 1515   Wound Width (cm) 0.5 cm 11/03/23 1515   Wound Depth (cm) 0.1 cm 11/03/23 1515   Wound Surface Area (cm^2) 0.05 cm^2 11/03/23 1515   Wound Volume (cm^3) 0.005 cm^3 11/03/23 1515   Post-Procedure Length (cm) 1 cm 11/03/23 1515   Post-Procedure Width (cm) 1.5 cm 11/03/23 1515   Post-Procedure Depth (cm) 0.1 cm 11/03/23 1515   Post-Procedure Surface Area (cm^2) 1.5 cm^2 11/03/23 1515   Post-Procedure Volume (cm^3) 0.15 cm^3 11/03/23 1515   Wound Healing % 17 11/03/23 1515   Tunneling (cm) 0 cm 11/10/23 1400   Undermining (cm) 0 cm 11/10/23 1400   Wound Odor None 11/10/23 1400   Exposed Structures None 11/10/23 1400   Number of days: 16       PROCEDURE: Excisional debridement of sacral / coccygeal pressure injury-wounds x 2  -2% viscous lidocaine applied topically to wound beds for approximately 5 minutes prior to debridement  -Curette used to debride wound bed.  Excisional debridement was performed to remove devitalized tissue until healthy, bleeding tissue was visualized.  Total wound area debrided approximately 8.0 cm² including into the wound tunnel.  Tissue debrided into the muscle / fascia layer.    -Bleeding controlled with manual pressure.    -Wound care completed by wound RN, refer to flowsheet  -Patient tolerated the procedure well, without c/o pain or discomfort.        No debridement of left second toe wound, right ischium, left heel, left medial lower leg, or left posterior leg wounds required today.    BIOLOGIC LOG  5/20/2022-first application.  PRODUCT: EpiCord 2 x 3 cm . PRODUCT #[id number]; EXPIRES: 2026-12-01 5/27/2022- second application.  PRODUCT: EpiCordEX 2 x 3 cm . PRODUCT #[id number]; EXPIRES: 2026-09-01    6/3/2022- third application.  PRODUCT: EpiCordEX 2 x 3 cm . PRODUCT #[id number]; EXPIRES: 2026-10-01    6/10/2022- fourth application.  PRODUCT:  "EpiCordEX 2 x 3 cm . PRODUCT #[id number]; EXPIRES: 2026-09-01 6/17/2022- fifth application.  PRODUCT: EpiCordEX 2 x 3 cm . PRODUCT #EX [id number]; EXPIRES: 2027-02-01 6/24/2022- sixth application.  PRODUCT: EpiCordEX 2 x 3 cm . PRODUCT #[id number]; EXPIRES: 2027-02-01 07/01/22: Seventh application.  Product: Epicort Dx 2.0 x 3.0 cm reorder number [id number]; product number [id number]; expires 2027-02-01 7/22/2022- eighth application.  PRODUCT: EpiCord 2 x 3 cm, reorder #[id number]. PRODUCT #[id number]; EXPIRES: 2027-02-01      Pertinent Labs and Diagnostics:    Labs:     A1c: No results found for: \"HBA1C\"     [facility] results, 7/1/2022 (under media tab)    CRP 13    ESR 31      IMAGING:     10/3/2022-CT of pelvis with contrast  IMPRESSION:     1.  Posterior soft tissue sacral wound and 1.5 x 3.7 cm abscess.   2.  Progressive destruction of the distal sacrum and proximal coccyx. Sclerosis and irregularity of the distal sacrum consistent with osteomyelitis.   3.  Old wound within the soft tissues posterior to the distal left SI joint with possible fistulous communication.    10/3/2022-CT of tib-fib with and without contrast, with reconstruction  IMPRESSION:     1.  Old fractures of the left tibia and fibula with extensive callus formation and bony bridging as well as extensive dystrophic calcifications. Similar to previous.   2.  Distal medial soft tissue wounds with ill-defined superficial in the soft tissue thickening and fluid collections suggestive of phlegmon. No organized abscess identified.   3.  No definite osteomyelitis.   4.  Arthritic changes.        VASCULAR STUDIES: No results found.    LAST  WOUND CULTURE:   Lab Results   Component Value Date/Time    CULTRSULT (A) 09/13/2023 04:10 PM     Growth noted after further incubation, see below for  organism identification.  Light growth usual skin ade.      CULTRSULT (A) 09/13/2023 04:10 PM     Proteus mirabilis " ESBL  Light growth  Extended Spectrum Beta-lactamase (ESBL) isolated.  ESBL's may be clinically resistant to therapy with  Penicillins,Cephalosporins or Aztreonam despite  apparent in vitro susceptibility to some of these agents.  The patient requires contact isolation.  Please contact pharmacy or an Infectious Disease Specialist  if you have any questions about appropriate therapy.      CULTRSULT Klebsiella pneumoniae  Light growth   (A) 09/13/2023 04:10 PM    CULTRSULT (A) 09/13/2023 04:10 PM     Methicillin Resistant Staphylococcus aureus  Light growth        PATHOLOGY  2/17/2023-bone fragment extracted from left lower extremity wound\\  FINAL DIAGNOSIS:     A. Left leg bone fragment at base of chronic wound:          Extensively degenerated fibrocartilaginous tissue with a rim of           fibrinopurulent debris          Correlate with culture findings            Comment: While no residual intact bone is identified, these           findings are suggestive of adjacent osteomyelitis.      ASSESSMENT AND PLAN:     1. Sacral decubitus ulcer, stage IV ([facility])  Comments: Ulcer first noted in early April 2022 as small open area which quickly enlarged.  This ulcer is present distal from previous sacral ulcer which healed after surgery in January.  Patient has history of flap reconstruction x2 to this area.  CT shows progressive destruction of distal sacrum and proximal coccyx.    11/10/2023: Area of both wounds has decreased since last assessment.  These 2 wounds still communicate via tract.  - Excisional debridement was performed in clinic, medically necessary to promote wound healing.   -Patient to return to clinic weekly for assessment and debridement  -Home health to change dressing 2 times per week   -Patient does spend a lot of time up in his wheelchair, and wishes to continue to do so for his quality of life.  He lives independently, drives, and is involved with family activities.  He does have an appropriate  pressure-relief cushion and is very well versed on pressure relieving techniques.  - Patient does not currently have follow up with Dr. Saini. If wound deteriorates or fails to improve can consider re-establishing with Dr. Saini for evaluation for coverage options. Patient does not want to pursue at this time and is happy with current conservative approach even with wound worsening.  - Known OM that has already been treated. No utility of Abx unless gross soft tissue infection which does not appear to be present today.    Wound care: Collagen, Hydrofiber silver strip, hydrofiber silver, Mepilex    2. Postoperative wound dehiscence, subsequent encounter  3. Osteomyelitis of left lower extremity  Comments: On 4/26/2022 patient underwent irrigation and debridement of multiple compartments of the left lower extremity states for pressure injury, with bone excision, and complex closure of chronic wound using biologic skin substitute.  During postop visit in surgeons office on 5/11, surgical site was noted to be dehisced.  Patient was referred to wound clinic for management.    11/10/2023: Left medial lower leg presents today with several small punctate wounds within area of scar tissue, minimal to moderate serosanguineous drainage.  This wound tends to wax and wane, poor tissue quality.  -No excisional debridement of this wound today  -Patient to return to clinic weekly for assessment and debridement as needed  -Home health to change dressing 1-2 times per week in between clinic visits OM.  -Suspect underlying OM, however patient does not wish to consider surgical options at this time nor does his surgeon wish to do any further surgery to this leg. 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 There is no gross soft tissue infection and repeated Abx treatment without source control is not indicated.    -We will assess these wounds each clinic visit  -Patient to be mindful of offloading when  supine, should assure that his leg is not rotating medially  Wound care: Hydrofiber silver, silicone adhesive foam, tubigrip    4. Deep tissue injury  5. Pressure injury of left foot, stage IV  Comments: DTI to posterior left lower leg first observed in clinic 7/29/2022.  Patient does not know how it started, had been wearing heel float boot consistently.  Evolved into stage IV pressure injury    11/10/2023: Plantar foot wound is again closed, however skin presents with purple/blue discoloration.  Possible DTI  -Monitor each clinic visit.  Very high risk for recurrence  -No need for debridement today  -Patient is well versed on offloading techniques  - Patient to be mindful of offloading his foot and posterior leg       Wound care: Foot wound-  Puracyn Gel, Silver Hydrofiber to manage exudate and bioburden, foam cover dressing, Tubigrip D to manage edema    6. Pressure injury of left heel, stage 3 ([facility])  Comments: First noted in clinic on 10/21/2022, originally noted to be stage II    11/10/2023: Skin remains intact though friable  -We will continue to monitor wound each visit, high risk for recurrence  - Patient continue wearing heel float boots at all times  - Heel does not appear to contact any solid surfaces while in wheelchair   Wound Care: Heel Mepilex to reduce pressure and friction    7.  Pressure injury of right buttock/ischium, stage III    11/10/2023: This wound was first noted on 10/25, presented as a stage II.  Has now evolved into a shallow stage III with increased depth.  No slough to wound bed  -No excisional debridement of wound in clinic today  -Patient's wheelchair cushion assessed, appears to be in good repair without area of lesion under wound-    Wound care: Silver Hydrofiber to manage exudate and bioburden, silicone foam cover dressing    8. Quadriplegia, C5-C7, incomplete ([facility])  Comments: Complicating factor.  Impaired mobility and sensation  -Patient is still spending 7-8 hours/day up in his  wheelchair, though he does have appropriate offloading cushion, and knows to reposition frequently.  Wears heel float boots bilaterally at all times    9.  Injury of toenail of left foot, subsequent encounter  10.  Infected wound    11/10/2023: Left second toenail removed last week due to partial avulsion.  Wound bed presents today with stable dry eschar, no drainage, no periwound erythema or induration  -Patient completed 10-day course of Augmentin   -Monitor site each clinic visit  -Paint daily with Betadine to keep stable and dry          Please note that this note may have been created using voice recognition software. I have worked with technical experts from [employer] [facility] to optimize the interface.  I have made every reasonable attempt to correct obvious errors, but there may be errors of grammar and possibly content that I did not discover before finalizing the note.

## 2023-11-11 NOTE — PATIENT INSTRUCTIONS
-Keep your wound dressing clean, dry, and intact.    -Change your dressing if it becomes soiled, soaked, or falls off.    -Should you experience any significant changes in your wound(s), such as infection (redness, swelling, localized heat, increased pain, fever > 101 F, chills) or have any questions regarding your home care instructions, please contact the wound center at [phone]. If after hours, contact your primary care physician or go to the hospital emergency room.

## 2023-11-17 ENCOUNTER — OFFICE VISIT (OUTPATIENT)
Dept: WOUND CARE | Facility: MEDICAL CENTER | Age: 73
End: 2023-11-17
Attending: INTERNAL MEDICINE
Payer: MEDICARE

## 2023-11-17 DIAGNOSIS — L89.154 SACRAL DECUBITUS ULCER, STAGE IV (HCC): ICD-10-CM

## 2023-11-17 DIAGNOSIS — L08.9 INFECTED WOUND: ICD-10-CM

## 2023-11-17 DIAGNOSIS — G82.53 QUADRIPLEGIA, C5-C7, COMPLETE (HCC): ICD-10-CM

## 2023-11-17 DIAGNOSIS — L89.313 PRESSURE INJURY OF RIGHT ISCHIUM, STAGE 3 (HCC): ICD-10-CM

## 2023-11-17 DIAGNOSIS — T14.8XXA INFECTED WOUND: ICD-10-CM

## 2023-11-17 DIAGNOSIS — M86.9 OSTEOMYELITIS OF LEFT LOWER EXTREMITY (HCC): ICD-10-CM

## 2023-11-17 DIAGNOSIS — L89.894 PRESSURE INJURY OF LEFT FOOT, STAGE 4 (HCC): ICD-10-CM

## 2023-11-17 DIAGNOSIS — T81.31XD POSTOPERATIVE WOUND DEHISCENCE, SUBSEQUENT ENCOUNTER: ICD-10-CM

## 2023-11-17 DIAGNOSIS — S99.922D INJURY OF TOENAIL OF LEFT FOOT, SUBSEQUENT ENCOUNTER: ICD-10-CM

## 2023-11-17 DIAGNOSIS — T14.8XXA DEEP TISSUE INJURY: ICD-10-CM

## 2023-11-17 DIAGNOSIS — L89.623 PRESSURE INJURY OF LEFT HEEL, STAGE 3 (HCC): ICD-10-CM

## 2023-11-17 PROCEDURE — 11042 DBRDMT SUBQ TIS 1ST 20SQCM/<: CPT | Mod: 59 | Performed by: NURSE PRACTITIONER

## 2023-11-17 PROCEDURE — 11043 DBRDMT MUSC&/FSCA 1ST 20/<: CPT | Performed by: NURSE PRACTITIONER

## 2023-11-17 PROCEDURE — 11042 DBRDMT SUBQ TIS 1ST 20SQCM/<: CPT

## 2023-11-17 PROCEDURE — 11043 DBRDMT MUSC&/FSCA 1ST 20/<: CPT

## 2023-11-18 NOTE — PROGRESS NOTES
Home Health wound care orders updated and entered into Epic and routed to [facility] via right fax.

## 2023-11-18 NOTE — PROGRESS NOTES
Provider Encounter- Pressure Injury        HISTORY OF PRESENT ILLNESS  Wound History:    START OF CARE IN CLINIC: 6/23/2023 (return to clinic after hospitalization)    REFERRING PROVIDER: Sahara Mendez      WOUNDS- Pressure Injury, Sacrococcygeal, stage IV                                                             Surgical wound dehiscence, left medial lower leg-status post surgical I&D of stage IV pressure injury and           biologic application                    Pressure injury, DTI, left posterior lower leg-first observed in clinic 7/29/2022       Pressure injury stage III L heel - first noted 10/21     Right 2nd toe avulsion - first noted 10/21     Pressure injury to right inferior buttock/ischium-first observed in clinic 10/25/2023      HISTORY: Patient with history of incomplete quadriplegia referred to [facility] for treatment of a stage IV pressure injury.  He has a history of previous pressure injuries to this area, and underwent muscle flaps in 2019, and then again in 2020.  He was seen in the wound clinic in November 2021 for an ulcer proximal from his current ulcer, and pressure injuries to his left posterior lower leg and left heel.  At that time, it was discovered that the patient had retained VAC foam embedded in the wound bed of the sacral wound.  Attempts were made to get him back to his plastic surgeon, though unsuccessful.  In January he underwent surgical removal of VAC sponge along with excisional debridement of his sacral wound by Dr. Chaves.  After the surgery, his wound went on to heal without incident.   In early April 2022, his home health nurse noted a new sacral ulcer, below the previous ulcer which quickly tripled in size over the following weeks.  The ulcer to his left medial lower leg had also deteriorated, with bone visible at the base..  He was hospitalized from 4/22 until 4/27/2022 and underwent surgery with Dr. Raman on 4/26 for irrigation and debridement of multiple compartments of  the left lower extremity, bone excision, and complex closure of chronic wound using biologic skin substitute.   His sacrococcygeal wound was not surgically addressed during this admission.  He was discharged back to his group home, with home health, and referral to outpatient wound clinic for his sacral wound.  He was instructed to follow-up with his surgeon for his lower leg wound.       Postoperatively, the left medial lower leg incision dehisced.  He was seen by his surgeon at [facility] on 5/11.  The surgeon opted to leave remaining sutures in place, and refer him to the wound clinic for treatment of this wound.   Treatment of this wound was initiated in clinic on 5/12.  During this visit was also noted that his heel DTI had resolved, but that he had a new pressure injury to his left posterior lower extremity.     A new pressure injury was noted to patient's right upper buttock/lower back on 5/20/2022.  Wound was linear in shape, skin discolored but intact.     Abrasion noted to left anterior lower leg.  First observed in clinic on 7/22/2022.  Patient states he bumped his leg into his food tray.     Small DTI noted to patient's left lateral lower leg on 7/29/2022.  Skin intact but discolored.     Large area of deep tissue injury noted to patient's left exterior lower leg.  Patient denied any trauma to this area.  Skin intact.  Wound documented.    1/27/2023: Patient was admitted to [facility] from 1/23-1/25/2023 with gross hematuria. He underwent RICHARD which showed watchman device was in place and he was taken off of Xarelto. While hospitalized wound team was consulted. He was referred back to [facility] and home health upon discharge.    Patient was hospitalized at [facility] for pyelonephritis from 2/26 until 3/2/2023, admitted for fever and general malaise.  He was admitted and initially started on linezolid and meropenem for suspected UTI and history of multidrug-resistant organisms.  Urine cultures were negative. ID was consulted,  recommended CT of chest and abdomen,which were negative for acute findings. However, he was treated with 5 days total course of antibiotics for suspected UTI, and symptoms completely resolved.  During this admission, the inpatient wound team was consulted for treatment of his sacral and lower leg wounds.  A wound culture was taken from his left heel pressure ulcer, negative.  Once stabilized, he was discharged home and referred back to [facility] to resume treatment of his wounds.    Pertinent Medical History: Incomplete quadriplegia, history of stage IV pressure injuries, history of flap procedures to sacral pressure injuries, osteomyelitis, obesity, colostomy in place   Contributing factors: Immobility and Obesity, impaired sensation    Personal support: Attendant-staff at assisted and home health nursing    TOBACCO USE:   Former smoker, quit in 1977.  Never used smokeless tobacco    Patient's problem list, allergies, and current medications reviewed and updated in Epic    Interval History:  Interval History thinned 7/29/2022.  Please see previous notes for complete interval history.     Interval History thinned 1/27/2023. Please see previous note for complete interval history.    Interval History thinned 3/3/2023.  Please see previous notes for complete interval history.      Interval History thinned 8/4/2023.  Please see previous notes for complete interval history.      7/28/2023: Clinic visit with Steve Hodges MD. Patient reports being in normal state of health. Denies any current issues. His lower wounds are largely unchanged. Sacrum continues to enlarge, he reports that he has been up in chair more frequently this week. Patient counseled on risks of enlarging pressure injury including risk that wound may progress, known OM may worsen or expand including causing illness. He is aware of risks and possible other treatment options, he would like to proceed with current treatment.    8/4/2023 : Clinic visit with  ADILSON Arthur FNP-BC, LILIYA VALERIO.  Anjum states he is feeling well overall.  Left medial lower leg wound has decreased in area significantly.  However, a new area has opened just below sacral ulcer,  from an ulcer by thin skin bridge.  Patient denies any changes to his usual activity.  His left heel wound remains healed, however he does have some slightly denuded skin to the heel.  He was seen by his podiatrist earlier this week, dressing was applied, possibly causing slight maceration.    8/11/2023 : Clinic visit with ADILSON Arthur FNP-BC, LILIYA VALERIO.   Anjum continues to feel well.  Unfortunately, left plantar foot lateral foot wound has reopened slightly.  The skin to his heel is slightly macerated, home health is using smaller heel foam dressing .  Sacrum measures about the same.  There is some friable red tissue along superior wound edge that was treated with AgNO3 today.    8/18/2023 : Clinic visit with ADILSON Arthur FNP-BC, LILIYA VALERIO.   Anjum states he is feeling well.  The plantar foot wound has deteriorated somewhat, area is increased.  Sacral wound is about the same.  Left lower leg wounds are both smaller, though with friable tissue.   Anjum's wounds have been essentially stable for a long time.  We discussed decreasing frequency of clinic visits to every 2 weeks.  Patient is agreeable to this plan.  He understands that we can resume weekly appointments if his wounds deteriorate.    9/1/2023 : Clinic visit with ADILSON Arthur FNP-BC, IMAN, LILIYA.   Anjum states he is in his usual state of health.  Unfortunately, his left heel wound has reopened, and his plantar foot wound has deteriorated.  The medial leg wound has again nearly closed, though tissue is boggy and will likely reopen.  His sacral wound continues to progress, though very slowly.    9/13/2023: Clinic visit with Steve Hodges MD. Patient reports feeling well. Left plantar lateral foot wound is larger with  increased drainage, wound tract that probes towards dorsal foot with some erythema and edema. Concerning for infection. No fluctuance. Patient has been on Augmentin, culture was cancelled previously as he was taking Abx before culture could be obtained. Due to worsening wound today, will add doxycycline to cover MRSA and obtain culture today, though may be sterile as has been on Abx. Sacral pressure injury with breakdown of previous wound site and larger. Patient is not overly concerned and does not want any surgical intervention.    9/18/2023: Clinic visit with Steve Hodges MD. Patient reports feeling in normal state of health. He denies any signs or symptoms of infection currently. Patient left foot wound appears improved. Patient remains on Augmentin. Wound culture was concerning for ESBL Proteus, will discuss case with ID as there are no simple oral options for coverage    9/29/2023 : Clinic visit with ADILSON Arthur, HOLDEN, GEETHA VALERIO   Anjum states he is feeling okay.  His plantar foot wound is again progressing, area has decreased.  The leg wound is nearly resolved, though poor tissue quality as previously noted.  He is left heel wound and left posterior leg wounds are also again resolved.  Unfortunately, his sacrococcygeal wound is larger, now communicates with superior wound.    10/6/2023 : Clinic visit with ADILSON Arthur FNP-BC, LILIYA VALERIO.   Anjum continues to feel well.  His left lower extremity wounds  are unchanged from last visit, left foot wound continues to progress.  Superior sacral wound measures slightly larger, inferior wound slightly smaller.  Patient is still not interested in VAC to heal these wounds.      10/13/2023 : Clinic visit with ADILSON Arthur FNP-BC, GEETHA VALERIO states he is feeling well.  His lower extremity wounds and foot wound are currently progressing well.  His sacral/coccyx wounds are not progressing.  Discussed restarting VAC.  However, would like  to excise skin bridge between the 2 wounds prior.  Patient is agreeable to this idea, however states he has a friend coming in from out of town who will be here for approximately 10 days.  Would like to postpone procedure and restarting of VAC for approximately 2 weeks.    10/20/2023 : Clinic visit with ADILSON Arthur, HOLDEN, IMAN, LILIYA.   Continues to feel well.  His plantar foot wound continues to progress.  Lateral leg wound is a bit larger today, though has tended to wax and wane.  His superior coccyx ulcer actually measures a bit smaller, and depth has decreased, however tissue somewhat boggy.  Inferior coccyx wound measures bit larger.  He still going to consider VAC to his coccyx wound, however wants to wait a few weeks as he has a friend coming into town.  We will revisit this next week.    10/25/2023 : Clinic visit with ADILSON Arthur, HOLDEN, IMAN, LILIYA.   Anjum is in his usual state of health, states he is feeling well overall.  He does have some redness and swelling to his left second toe, toenail is loose.  He states he believes he may have bumped into something, but is not sure when or what.  He does not appear to be a significant wound.  I sent Rx for Augmentin to cover possible infection.  We will monitor this wound each visit.  May need to remove toenail at some point.   His foot wound is again nearly resolved, sacral wounds are about the same.    11/3/2023 : Clinic visit with ADILSON Arthur, HOLDEN, IMAN, LILIYA.   Anjum is feeling well today.  His plantar foot wound is again resolved.  Medial lower leg wound continues to wax and wane with poor tissue quality.  His heel and posterior leg wounds both present with intact skin, though friable.   His left second toenail is loose, first noted last visit.  Toenail removed in clinic today   Both sacral wounds have decreased in area.  Anjum had some concerns about starting VAC.  Given the current progress of these wounds, I do not feel VAC is  necessary.  This can be reconsidered if these wounds stall or deteriorate    11/10/2023 : Clinic visit with ADILSON Arthur, HOLDEN, IMAN, LILIYA.   Anjum continues to feel well.  His plantar foot wound remains resolved, though presents with purple/blue discoloration-bears watching.  Medial leg wound about the same, small open areas with poor tissue quality.  Skin to left heel remains intact but fragile.  Sacral wounds are currently progressing well, though still communicating via tract.  Nailbed of second toe was covered with dry eschar, site of toenail removal last week, appears to be stable.  Small wound to right ischium is slightly worse today.  I checked patient's wheelchair cushion, appears to be in good repair with no deflated areas.    11/17/2023 : Clinic visit with ADILSON Arthur, HOLDEN, IMAN, LILIYA.   Anjum is doing well today.  He states he had a friend come into town this week and spent a lot of time sitting and talking with him.  Surprisingly, his sacral wound did not deteriorate significantly.,  However his right ischial wound has deteriorated, and his posterior leg wound has slightly reopened.      REVIEW OF SYSTEMS:   Unchanged from previous wound clinic assessment on 11/10/2023, except as noted in interval history above     PHYSICAL EXAMINATION:   There were no vitals taken for this visit.  Physical Exam  Constitutional:       Appearance: He is obese.   Cardiovascular:      Rate and Rhythm: Normal rate.   Pulmonary:      Effort: Pulmonary effort is normal.   Abdominal:      Comments: Colostomy left lower quadrant   Genitourinary:     Comments: Suprapubic catheter to down drain   Skin:     Comments: Stage IV coccygeal pressure injury -2 wounds, superior and inferior.  Both wounds have decreased in area, still communicate via tract, moderate serosanguineous drainage, no evidence of infection      Full-thickness wound to left medial lower leg -open wounds continue to wax and wane, poor tissue  quality, minimal to moderate serosanguineous drainage, no evidence of infection    Stage III pressure injury left posterior heel -skin remains intact though friable.  High risk for recurrence    Stage IV pressure injury plantar foot -skin remains intact with purple/blue discoloration, no drainage.  High risk for recurrence    Right ischium pressure injury, shallow stage III-wound area and depth have increased since last assessment, no slough to wound bed, minimal to moderate serosanguineous drainage, no evidence of infection    Stage III pressure injury to posterior left lower leg-wound has reopened, shallow stage III, slough to wound bed, moderate serosanguineous drainage, no evidence of infection    Left second toe nailbed-remains covered with stable dry eschar, no drainage, no periwound erythema or induration     Neurological:      Mental Status: He is alert and oriented to person, place, and time.   Psychiatric:         Mood and Affect: Mood normal.         WOUND ASSESSMENT  Wound 06/18/23 Pressure Injury Coccyx Stage IV Inferior wound (Active)   Wound Image    11/17/23 1500   Site Assessment Red;Boggy 11/17/23 1500   Periwound Assessment Scar tissue 11/17/23 1500   Margins Epibole (rolled edges);Unattached edges 11/17/23 1500   Closure Secondary intention 11/17/23 1500   Drainage Amount Moderate 11/17/23 1500   Drainage Description Serosanguineous 11/17/23 1500   Treatments Cleansed;Provider debridement;Site care 11/17/23 1500   Wound Cleansing Hypochlorus Acid 11/17/23 1500   Periwound Protectant No-sting Skin Prep;Barrier Paste 11/17/23 1500   Dressing Changed Changed 11/17/23 1500   Dressing Cleansing/Solutions Not Applicable 11/17/23 1500   Dressing Options Hydrofiber Silver Strip;Hydrofiber Silver;Offloading Dressing - Sacral 11/17/23 1500   Dressing Change/Treatment Frequency Monday, Wednesday, Friday, and As Needed 11/17/23 1500   Wound Team Following Weekly 11/17/23 1500   WOUND NURSE ONLY - Pressure  Injury Stage 4 11/17/23 1500   Wound Length (cm) 4.3 cm 11/17/23 1500   Wound Width (cm) 1 cm 11/17/23 1500   Wound Depth (cm) 0.8 cm 11/17/23 1500   Wound Surface Area (cm^2) 4.3 cm^2 11/17/23 1500   Wound Volume (cm^3) 3.44 cm^3 11/17/23 1500   Post-Procedure Length (cm) 4.3 cm 11/17/23 1500   Post-Procedure Width (cm) 1 cm 11/17/23 1500   Post-Procedure Depth (cm) 0.8 cm 11/17/23 1500   Post-Procedure Surface Area (cm^2) 4.3 cm^2 11/17/23 1500   Post-Procedure Volume (cm^3) 3.44 cm^3 11/17/23 1500   Wound Healing % 5 11/17/23 1500   Tunneling (cm) 3 cm 11/10/23 1400   Tunneling Clock Position of Wound 12 11/17/23 1500   Undermining (cm) 0 cm 11/17/23 1500   Undermining of Wound, 1st Location From 6 o'clock;To 7 o'clock 11/03/23 1515   Undermining (cm) - 2nd location 3.1 cm 09/18/23 1200   Undermining of Wound, 2nd Location From 11 o'clock;From 1 o'clock 09/18/23 1200   Wound Odor None 11/17/23 1500   Exposed Structures None 11/17/23 1500   Number of days: 152       Wound 06/18/23 Full Thickness Wound Leg Medial Left (Active)   Wound Image   11/17/23 1500   Site Assessment Red;Loiza 11/17/23 1500   Periwound Assessment Red;Blanchable erythema;Edema 11/17/23 1500   Margins Attached edges 11/17/23 1500   Drainage Amount Moderate 11/17/23 1500   Drainage Description Serosanguineous 11/17/23 1500   Treatments Cleansed;Site care 11/17/23 1500   Wound Cleansing Hypochlorus Acid 11/17/23 1500   Periwound Protectant No-sting Skin Prep;Barrier Paste 11/17/23 1500   Dressing Changed Changed 11/17/23 1500   Dressing Cleansing/Solutions Not Applicable 11/17/23 1500   Dressing Options Hydrofiber Silver;Silicone Adhesive Foam;Offloading Dressing - Sacral;Tubigrip 11/17/23 1500   Dressing Change/Treatment Frequency Monday, Wednesday, Friday, and As Needed 11/17/23 1500   Wound Team Following Weekly 11/17/23 1500   Non-staged Wound Description Full thickness 11/17/23 1500   Wound Length (cm) 1 cm 11/17/23 1500   Wound Width (cm) 2  cm 11/17/23 1500   Wound Depth (cm) 0.2 cm 11/17/23 1500   Wound Surface Area (cm^2) 2 cm^2 11/17/23 1500   Wound Volume (cm^3) 0.4 cm^3 11/17/23 1500   Post-Procedure Length (cm) 1 cm 10/20/23 1500   Post-Procedure Width (cm) 2.5 cm 10/20/23 1500   Post-Procedure Depth (cm) 0.3 cm 10/20/23 1500   Post-Procedure Surface Area (cm^2) 2.5 cm^2 10/20/23 1500   Post-Procedure Volume (cm^3) 0.75 cm^3 10/20/23 1500   Wound Healing % -33 11/17/23 1500   Tunneling (cm) 0 cm 11/17/23 1500   Undermining (cm) 0 cm 11/17/23 1500   Wound Odor None 11/17/23 1500   Exposed Structures None 11/17/23 1500   Number of days: 152       Wound 06/18/23 Pressure Injury Leg Posterior Left resolved 9/1/23, re-opened 11/17/23 (Active)   Wound Image    11/17/23 1500   Site Assessment Red 11/17/23 1500   Periwound Assessment Dry;Blanchable erythema 11/17/23 1500   Margins Attached edges 11/17/23 1500   Closure Secondary intention 11/17/23 1500   Drainage Amount Moderate 11/17/23 1500   Drainage Description Serosanguineous 11/17/23 1500   Treatments Cleansed;Provider debridement;Site care 11/17/23 1500   Wound Cleansing Hypochlorus Acid 11/17/23 1500   Periwound Protectant No-sting Skin Prep;Barrier Paste 11/17/23 1500   Dressing Changed New 11/17/23 1500   Dressing Cleansing/Solutions Not Applicable 11/17/23 1500   Dressing Options Hydrofiber Silver;Silicone Adhesive Foam;Offloading Dressing - Sacral;Tubigrip 11/17/23 1500   Dressing Change/Treatment Frequency Every 72 hrs, and As Needed 11/17/23 1500   Wound Team Following Weekly 11/17/23 1500   WOUND NURSE ONLY - Pressure Injury Stage 3 11/17/23 1500   Non-staged Wound Description Not applicable 11/17/23 1500   Wound Length (cm) 1 cm 11/17/23 1500   Wound Width (cm) 1.5 cm 11/17/23 1500   Wound Depth (cm) 0.2 cm 11/17/23 1500   Wound Surface Area (cm^2) 1.5 cm^2 11/17/23 1500   Wound Volume (cm^3) 0.3 cm^3 11/17/23 1500   Post-Procedure Length (cm) 1.2 cm 11/17/23 1500   Post-Procedure Width  (cm) 1.3 cm 11/17/23 1500   Post-Procedure Depth (cm) 0.2 cm 11/17/23 1500   Post-Procedure Surface Area (cm^2) 1.56 cm^2 11/17/23 1500   Post-Procedure Volume (cm^3) 0.312 cm^3 11/17/23 1500   Wound Healing % 70 11/17/23 1500   Tunneling (cm) 0 cm 11/17/23 1500   Undermining (cm) 0 cm 11/17/23 1500   Wound Odor None 11/17/23 1500   Exposed Structures None 11/17/23 1500   Number of days: 152       Wound 08/11/23 Full Thickness Wound Foot Lateral Left (Active)   Wound Image   11/17/23 1500   Site Assessment Red;Light Purple 11/17/23 1500   Periwound Assessment Dry;Callused;Edema 11/17/23 1500   Margins Attached edges 11/17/23 1500   Drainage Amount Scant 11/17/23 1500   Drainage Description Serosanguineous 11/17/23 1500   Treatments Cleansed;Site care 11/17/23 1500   Wound Cleansing Hypochlorus Acid 11/17/23 1500   Periwound Protectant No-sting Skin Prep;Barrier Paste 11/17/23 1500   Dressing Status Clean;Intact 08/11/23 1500   Dressing Changed Changed 11/17/23 1500   Dressing Cleansing/Solutions Not Applicable 11/17/23 1500   Dressing Options Hydrofiber Silver;Silicone Adhesive Foam;Tubigrip 11/17/23 1500   Dressing Change/Treatment Frequency Every 72 hrs, and As Needed 11/17/23 1500   Wound Team Following Weekly 11/17/23 1500   Non-staged Wound Description Full thickness 11/17/23 1500   Wound Length (cm) 0.2 cm 11/17/23 1500   Wound Width (cm) 0.3 cm 11/17/23 1500   Wound Depth (cm) 0.2 cm 11/17/23 1500   Wound Surface Area (cm^2) 0.06 cm^2 11/17/23 1500   Wound Volume (cm^3) 0.012 cm^3 11/17/23 1500   Post-Procedure Length (cm) 0.5 cm 10/25/23 1500   Post-Procedure Width (cm) 0.6 cm 10/25/23 1500   Post-Procedure Depth (cm) 0.1 cm 10/25/23 1500   Post-Procedure Surface Area (cm^2) 0.3 cm^2 10/25/23 1500   Post-Procedure Volume (cm^3) 0.03 cm^3 10/25/23 1500   Wound Healing % 50 11/17/23 1500   Tunneling (cm) 0 cm 11/17/23 1500   Tunneling Clock Position of Wound 5 10/06/23 1500   Undermining (cm) 0 cm 11/17/23  1500   Undermining of Wound, 1st Location From 5 o'clock;To 7 o'clock 10/25/23 1500   Wound Odor None 11/17/23 1500   Exposed Structures None 11/17/23 1500   Number of days: 98       Wound 09/13/23 Pressure Injury Coccyx Superior (Active)   Wound Image   11/17/23 1500   Site Assessment Red;Boggy 11/17/23 1500   Periwound Assessment Maceration;Scar tissue 11/17/23 1500   Margins Unattached edges;Epibole (rolled edges) 11/17/23 1500   Drainage Amount Moderate 11/17/23 1500   Drainage Description Serosanguineous 11/17/23 1500   Treatments Cleansed;Provider debridement;Site care 11/17/23 1500   Wound Cleansing Hypochlorus Acid 11/17/23 1500   Periwound Protectant No-sting Skin Prep;Barrier Paste 11/17/23 1500   Dressing Status Old drainage 11/03/23 1515   Dressing Changed Changed 11/17/23 1500   Dressing Cleansing/Solutions Not Applicable 11/17/23 1500   Dressing Options Hydrofiber Silver Strip;Hydrofiber Silver;Offloading Dressing - Sacral 11/17/23 1500   Dressing Change/Treatment Frequency Monday, Wednesday, Friday, and As Needed 11/17/23 1500   Wound Team Following Weekly 11/17/23 1500   WOUND NURSE ONLY - Pressure Injury Stage 4 11/10/23 1400   Non-staged Wound Description Not applicable 11/17/23 1500   Wound Length (cm) 1 cm 11/17/23 1500   Wound Width (cm) 0.5 cm 11/17/23 1500   Wound Depth (cm) 0.9 cm 11/17/23 1500   Wound Surface Area (cm^2) 0.5 cm^2 11/17/23 1500   Wound Volume (cm^3) 0.45 cm^3 11/17/23 1500   Post-Procedure Length (cm) 1 cm 11/17/23 1500   Post-Procedure Width (cm) 0.5 cm 11/17/23 1500   Post-Procedure Depth (cm) 0.9 cm 11/17/23 1500   Post-Procedure Surface Area (cm^2) 0.5 cm^2 11/17/23 1500   Post-Procedure Volume (cm^3) 0.45 cm^3 11/17/23 1500   Wound Healing % 17 11/17/23 1500   Tunneling (cm) 2 cm 11/17/23 1500   Tunneling Clock Position of Wound 6 11/10/23 1400   Undermining (cm) 0 cm 11/17/23 1500   Undermining of Wound, 1st Location From 7 o'clock;To 9 o'clock 10/25/23 1500   Wound  Odor None 11/17/23 1500   Exposed Structures None 11/17/23 1500   Number of days: 65       Wound 10/25/23 Pressure Injury Right ischial tuberosity (Active)   Wound Image   11/17/23 1500   Site Assessment Red;Yellow 11/17/23 1500   Periwound Assessment Clean;Dry;Intact 11/17/23 1500   Margins Attached edges 11/17/23 1500   Closure Secondary intention 11/17/23 1500   Drainage Amount Moderate 11/17/23 1500   Drainage Description Serosanguineous 11/17/23 1500   Treatments Cleansed;Provider debridement;Site care 11/17/23 1500   Wound Cleansing Hypochlorus Acid 11/17/23 1500   Periwound Protectant No-sting Skin Prep;Barrier Paste 11/17/23 1500   Dressing Status Clean;Dry;Intact 11/17/23 1500   Dressing Changed New 11/17/23 1500   Dressing Cleansing/Solutions Not Applicable 11/17/23 1500   Dressing Options Hydrofiber Silver;Offloading Dressing - Sacral 11/17/23 1500   Dressing Change/Treatment Frequency Monday, Wednesday, Friday, and As Needed 11/17/23 1500   Wound Team Following Weekly 11/17/23 1500   WOUND NURSE ONLY - Pressure Injury Stage 3 11/17/23 1500   Wound Length (cm) 1 cm 11/17/23 1500   Wound Width (cm) 0.7 cm 11/17/23 1500   Wound Depth (cm) 0.2 cm 11/17/23 1500   Wound Surface Area (cm^2) 0.7 cm^2 11/17/23 1500   Wound Volume (cm^3) 0.14 cm^3 11/17/23 1500   Post-Procedure Length (cm) 1 cm 11/17/23 1500   Post-Procedure Width (cm) 0.7 cm 11/17/23 1500   Post-Procedure Depth (cm) 0.2 cm 11/17/23 1500   Post-Procedure Surface Area (cm^2) 0.7 cm^2 11/17/23 1500   Post-Procedure Volume (cm^3) 0.14 cm^3 11/17/23 1500   Wound Healing % -1650 11/17/23 1500   Tunneling (cm) 0 cm 11/17/23 1500   Undermining (cm) 0 cm 11/17/23 1500   Wound Odor None 11/17/23 1500   Exposed Structures None 11/17/23 1500   Number of days: 23       Wound 10/25/23 Full Thickness Wound Toe, 2nd Dorsal Left Cuticle (Active)   Wound Image   11/17/23 1500   Site Assessment Brown;Dry 11/17/23 1500   Periwound Assessment Clean;Dry;Intact  11/17/23 1500   Margins Attached edges 11/17/23 1500   Drainage Amount None 11/17/23 1500   Drainage Description Serosanguineous 11/03/23 1515   Treatments Cleansed;Site care 11/17/23 1500   Wound Cleansing Hypochlorus Acid 11/17/23 1500   Periwound Protectant Not Applicable 11/17/23 1500   Dressing Status Open to Air 11/17/23 1500   Dressing Cleansing/Solutions Not Applicable 11/17/23 1500   Dressing Options Open to Air 11/17/23 1500   Dressing Change/Treatment Frequency Monday, Wednesday, Friday, and As Needed 11/17/23 1500   Wound Team Following Weekly 11/17/23 1500   Non-staged Wound Description Full thickness 11/17/23 1500   Wound Length (cm) 0.1 cm 11/03/23 1515   Wound Width (cm) 0.5 cm 11/03/23 1515   Wound Depth (cm) 0.1 cm 11/03/23 1515   Wound Surface Area (cm^2) 0.05 cm^2 11/03/23 1515   Wound Volume (cm^3) 0.005 cm^3 11/03/23 1515   Post-Procedure Length (cm) 1 cm 11/03/23 1515   Post-Procedure Width (cm) 1.5 cm 11/03/23 1515   Post-Procedure Depth (cm) 0.1 cm 11/03/23 1515   Post-Procedure Surface Area (cm^2) 1.5 cm^2 11/03/23 1515   Post-Procedure Volume (cm^3) 0.15 cm^3 11/03/23 1515   Wound Healing % 17 11/03/23 1515   Tunneling (cm) 0 cm 11/17/23 1500   Undermining (cm) 0 cm 11/17/23 1500   Wound Odor None 11/17/23 1500   Exposed Structures None 11/17/23 1500   Number of days: 23       PROCEDURE: Excisional debridement of sacral / coccygeal pressure injury-wounds x 2  -2% viscous lidocaine applied topically to wound beds for approximately 5 minutes prior to debridement  -Curette used to debride wound bed.  Excisional debridement was performed to remove devitalized tissue until healthy, bleeding tissue was visualized.  Total wound area debrided 4.8 cm² including into the wound tunnel.  Tissue debrided into the muscle / fascia layer.    -Bleeding controlled with manual pressure.    -Wound care completed by wound RN, refer to flowsheet  -Patient tolerated the procedure well, without c/o pain or  "discomfort.      PROCEDURE: Excisional debridement of left posterior leg ulcer, and right ischial ulcer  -2% viscous lidocaine applied topically to wound bed for approximately 5 minutes prior to debridement  -Curette used to debride wound bed.  Excisional debridement was performed to remove devitalized tissue until healthy, bleeding tissue was visualized.   Entire surface of wound, 2.26 cm² debrided.  Tissue debrided into the subcutaneous layer.    -Bleeding controlled with manual pressure.    -Wound care completed by wound RN, refer to flowsheet  -Patient tolerated the procedure well, without c/o pain or discomfort.        No debridement of left second toe wound, right ischium, left heel, left medial lower leg, or left posterior leg wounds required today.    BIOLOGIC LOG  5/20/2022-first application.  PRODUCT: EpiCord 2 x 3 cm . PRODUCT #[id number]; EXPIRES: 2026-12-01 5/27/2022- second application.  PRODUCT: EpiCordEX 2 x 3 cm . PRODUCT #[id number]; EXPIRES: 2026-09-01    6/3/2022- third application.  PRODUCT: EpiCordEX 2 x 3 cm . PRODUCT #[id number]; EXPIRES: 2026-10-01    6/10/2022- fourth application.  PRODUCT: EpiCordEX 2 x 3 cm . PRODUCT #[id number]; EXPIRES: 2026-09-01 6/17/2022- fifth application.  PRODUCT: EpiCordEX 2 x 3 cm . PRODUCT #[id number]; EXPIRES: 2027-02-01 6/24/2022- sixth application.  PRODUCT: EpiCordEX 2 x 3 cm . PRODUCT #[id number]; EXPIRES: 2027-02-01 07/01/22: Seventh application.  Product: Epicort Dx 2.0 x 3.0 cm reorder number [id number]; product number [id number]; expires 2027-02-01 7/22/2022- eighth application.  PRODUCT: EpiCord 2 x 3 cm, reorder #[id number]. PRODUCT #[id number]; EXPIRES: 2027-02-01      Pertinent Labs and Diagnostics:    Labs:     A1c: No results found for: \"HBA1C\"     [facility] results, 7/1/2022 (under media tab)    CRP 13    ESR 31      IMAGING:     10/3/2022-CT of pelvis with " contrast  IMPRESSION:     1.  Posterior soft tissue sacral wound and 1.5 x 3.7 cm abscess.   2.  Progressive destruction of the distal sacrum and proximal coccyx. Sclerosis and irregularity of the distal sacrum consistent with osteomyelitis.   3.  Old wound within the soft tissues posterior to the distal left SI joint with possible fistulous communication.    10/3/2022-CT of tib-fib with and without contrast, with reconstruction  IMPRESSION:     1.  Old fractures of the left tibia and fibula with extensive callus formation and bony bridging as well as extensive dystrophic calcifications. Similar to previous.   2.  Distal medial soft tissue wounds with ill-defined superficial in the soft tissue thickening and fluid collections suggestive of phlegmon. No organized abscess identified.   3.  No definite osteomyelitis.   4.  Arthritic changes.        VASCULAR STUDIES: No results found.    LAST  WOUND CULTURE:   Lab Results   Component Value Date/Time    CULTRSULT (A) 09/13/2023 04:10 PM     Growth noted after further incubation, see below for  organism identification.  Light growth usual skin ade.      CULTRSULT (A) 09/13/2023 04:10 PM     Proteus mirabilis ESBL  Light growth  Extended Spectrum Beta-lactamase (ESBL) isolated.  ESBL's may be clinically resistant to therapy with  Penicillins,Cephalosporins or Aztreonam despite  apparent in vitro susceptibility to some of these agents.  The patient requires contact isolation.  Please contact pharmacy or an Infectious Disease Specialist  if you have any questions about appropriate therapy.      CULTRSULT Klebsiella pneumoniae  Light growth   (A) 09/13/2023 04:10 PM    CULTRSULT (A) 09/13/2023 04:10 PM     Methicillin Resistant Staphylococcus aureus  Light growth        PATHOLOGY  2/17/2023-bone fragment extracted from left lower extremity wound\\  FINAL DIAGNOSIS:     A. Left leg bone fragment at base of chronic wound:          Extensively degenerated fibrocartilaginous  tissue with a rim of           fibrinopurulent debris          Correlate with culture findings            Comment: While no residual intact bone is identified, these           findings are suggestive of adjacent osteomyelitis.      ASSESSMENT AND PLAN:     1. Sacral decubitus ulcer, stage IV (HCC)  Comments: Ulcer first noted in early April 2022 as small open area which quickly enlarged.  This ulcer is present distal from previous sacral ulcer which healed after surgery in January.  Patient has history of flap reconstruction x2 to this area.  CT shows progressive destruction of distal sacrum and proximal coccyx.    11/17/2023: Area of distal wound has increased since last assessment, superior wound about the same.  Patient admits to sitting more this past week due to having a visitor from out of town.  - Excisional debridement was performed in clinic, medically necessary to promote wound healing.   -Patient to return to clinic weekly for assessment and debridement  -Home health to change dressing 2 times per week   -Patient does spend a lot of time up in his wheelchair, and wishes to continue to do so for his quality of life.  He lives independently, drives, and is involved with family activities.  He does have an appropriate pressure-relief cushion and is very well versed on pressure relieving techniques.  - Patient does not currently have follow up with Dr. Saini. If wound deteriorates or fails to improve can consider re-establishing with Dr. Saini for evaluation for coverage options. Patient does not want to pursue at this time and is happy with current conservative approach even with wound worsening.  - Known OM that has already been treated. No utility of Abx unless gross soft tissue infection which does not appear to be present today.    Wound care: Collagen, Hydrofiber silver strip, hydrofiber silver, Mepilex    2. Postoperative wound dehiscence, subsequent encounter  3. Osteomyelitis of left lower  extremity  Comments: On 4/26/2022 patient underwent irrigation and debridement of multiple compartments of the left lower extremity states for pressure injury, with bone excision, and complex closure of chronic wound using biologic skin substitute.  During postop visit in surgeons office on 5/11, surgical site was noted to be dehisced.  Patient was referred to wound clinic for management.    11/17/2023: Several punctate wounds which tend to wax and wane.  Poor tissue quality.  -No excisional debridement of this wound today  -Patient to return to clinic weekly for assessment and debridement as needed  -Home health to change dressing 1-2 times per week in between clinic visits OM.  -Suspect underlying OM, however patient does not wish to consider surgical options at this time nor does his surgeon wish to do any further surgery to this leg. 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 There is no gross soft tissue infection and repeated Abx treatment without source control is not indicated.    -We will assess these wounds each clinic visit  -Patient to be mindful of offloading when supine, should assure that his leg is not rotating medially  Wound care: Hydrofiber silver, silicone adhesive foam, tubigrip    4. Deep tissue injury  5. Pressure injury of left foot, stage IV  Comments: DTI to posterior left lower leg first observed in clinic 7/29/2022.  Patient does not know how it started, had been wearing heel float boot consistently.  Evolved into stage IV pressure injury    11/17/2023: Plantar foot wound remains closed with blue/purple discoloration of skin.  Possible DTI  -Monitor each clinic visit.  Very high risk for recurrence  -No need for debridement today  -Patient is well versed on offloading techniques  - Patient to be mindful of offloading his foot and posterior leg       Wound care: Posterior leg wound-  Wound care: Silver Hydrofiber to manage exudate and bioburden, foam cover  dressing, Hypafix tape     6. Pressure injury of left heel, stage 3 ([facility])  Comments: First noted in clinic on 10/21/2022, originally noted to be stage II    11/17/2023: Remains healed, skin intact though friable.    -We will continue to monitor wound each visit, high risk for recurrence  - Patient continue wearing heel float boots at all times  - Heel does not appear to contact any solid surfaces while in wheelchair   Wound Care: Heel Mepilex to reduce pressure and friction    7.  Pressure injury of right buttock/ischium, stage III    11/17/2023: Wound has deteriorated, area has increased, slough to wound bed.  Patient admits that he was sitting for longer periods of time over the past week due to visitor from out of town.  -Excisional debridement of wound in clinic today, medically necessary to promote wound healing.  -Patient to return to clinic weekly for assessment and debridement  -Patient to change dressing 1-2 times per week in between clinic visits     Wound care: Silver Hydrofiber to manage exudate and bioburden, silicone foam cover dressing    8. Quadriplegia, C5-C7, incomplete ([facility])  Comments: Complicating factor.  Impaired mobility and sensation  -Patient is still spending 7-8 hours/day up in his wheelchair, though he does have appropriate offloading cushion, and knows to reposition frequently.  Wears heel float boots bilaterally at all times    9.  Injury of toenail of left foot, subsequent encounter  10.  Infected wound    11/17/2023: Stable eschar to nailbed of left second toe.  No evidence of infection  -Patient completed 10-day course of Augmentin Monitor site each clinic visit  -Paint daily with Betadine to keep stable and dry          Please note that this note may have been created using voice recognition software. I have worked with technical experts from [employer] to optimize the interface.  I have made every reasonable attempt to correct obvious errors, but there may be errors of grammar  and possibly content that I did not discover before finalizing the note.

## 2023-11-18 NOTE — PATIENT INSTRUCTIONS
-Keep your wound dressing clean, dry, and intact.    -Should you experience any significant changes in your wound(s), such as infection (redness, swelling, localized heat, increased pain, fever > 101 F, chills) or have any questions regarding your home care instructions, please contact the wound center at [phone]. If after hours, contact your primary care physician or go to the hospital emergency room.

## 2023-11-20 ENCOUNTER — NON-PROVIDER VISIT (OUTPATIENT)
Dept: CARDIOLOGY | Facility: MEDICAL CENTER | Age: 73
End: 2023-11-20
Payer: MEDICARE

## 2023-11-20 PROCEDURE — 93298 REM INTERROG DEV EVAL SCRMS: CPT | Performed by: INTERNAL MEDICINE

## 2023-11-20 NOTE — CARDIAC REMOTE MONITOR - SCAN
Device transmission reviewed. Device demonstrated appropriate function.       Electronically Signed by: Briana Bell M.D.    12/13/2023  11:45 AM

## 2023-11-22 ENCOUNTER — OFFICE VISIT (OUTPATIENT)
Dept: WOUND CARE | Facility: MEDICAL CENTER | Age: 73
End: 2023-11-22
Attending: INTERNAL MEDICINE
Payer: MEDICARE

## 2023-11-22 VITALS
SYSTOLIC BLOOD PRESSURE: 115 MMHG | RESPIRATION RATE: 20 BRPM | TEMPERATURE: 98.2 F | HEART RATE: 49 BPM | OXYGEN SATURATION: 97 % | DIASTOLIC BLOOD PRESSURE: 58 MMHG

## 2023-11-22 DIAGNOSIS — L08.9 INFECTED WOUND: ICD-10-CM

## 2023-11-22 DIAGNOSIS — T14.8XXA INFECTED WOUND: ICD-10-CM

## 2023-11-22 DIAGNOSIS — T14.8XXA DEEP TISSUE INJURY: ICD-10-CM

## 2023-11-22 DIAGNOSIS — M86.9 OSTEOMYELITIS OF LEFT LOWER EXTREMITY (HCC): ICD-10-CM

## 2023-11-22 DIAGNOSIS — T81.31XD POSTOPERATIVE WOUND DEHISCENCE, SUBSEQUENT ENCOUNTER: ICD-10-CM

## 2023-11-22 DIAGNOSIS — S99.922D INJURY OF TOENAIL OF LEFT FOOT, SUBSEQUENT ENCOUNTER: ICD-10-CM

## 2023-11-22 DIAGNOSIS — L89.313 PRESSURE INJURY OF RIGHT ISCHIUM, STAGE 3 (HCC): ICD-10-CM

## 2023-11-22 DIAGNOSIS — L89.623 PRESSURE INJURY OF LEFT HEEL, STAGE 3 (HCC): ICD-10-CM

## 2023-11-22 DIAGNOSIS — L89.154 SACRAL DECUBITUS ULCER, STAGE IV (HCC): Primary | ICD-10-CM

## 2023-11-22 DIAGNOSIS — L89.890 PRESSURE INJURY OF LEFT LEG, UNSTAGEABLE (HCC): ICD-10-CM

## 2023-11-22 DIAGNOSIS — L89.894 PRESSURE INJURY OF LEFT FOOT, STAGE 4 (HCC): ICD-10-CM

## 2023-11-22 DIAGNOSIS — G82.53 QUADRIPLEGIA, C5-C7, COMPLETE (HCC): ICD-10-CM

## 2023-11-22 PROCEDURE — 3074F SYST BP LT 130 MM HG: CPT | Performed by: NURSE PRACTITIONER

## 2023-11-22 PROCEDURE — 11042 DBRDMT SUBQ TIS 1ST 20SQCM/<: CPT

## 2023-11-22 PROCEDURE — 11043 DBRDMT MUSC&/FSCA 1ST 20/<: CPT

## 2023-11-22 PROCEDURE — 11042 DBRDMT SUBQ TIS 1ST 20SQCM/<: CPT | Mod: 59 | Performed by: NURSE PRACTITIONER

## 2023-11-22 PROCEDURE — 3078F DIAST BP <80 MM HG: CPT | Performed by: NURSE PRACTITIONER

## 2023-11-22 PROCEDURE — 11043 DBRDMT MUSC&/FSCA 1ST 20/<: CPT | Performed by: NURSE PRACTITIONER

## 2023-11-23 NOTE — PROGRESS NOTES
Provider Encounter- Pressure Injury        HISTORY OF PRESENT ILLNESS  Wound History:    START OF CARE IN CLINIC: 6/23/2023 (return to clinic after hospitalization)    REFERRING PROVIDER: Sahara Mendez      WOUNDS- Pressure Injury, Sacrococcygeal, stage IV                                                             Surgical wound dehiscence, left medial lower leg-status post surgical I&D of stage IV pressure injury and           biologic application                    Pressure injury, DTI, left posterior lower leg-first observed in clinic 7/29/2022       Pressure injury stage III L heel - first noted 10/21     Right 2nd toe avulsion - first noted 10/21     Pressure injury to right inferior buttock/ischium-first observed in clinic 10/25/2023      HISTORY: Patient with history of incomplete quadriplegia referred to [facility] for treatment of a stage IV pressure injury.  He has a history of previous pressure injuries to this area, and underwent muscle flaps in 2019, and then again in 2020.  He was seen in the wound clinic in November 2021 for an ulcer proximal from his current ulcer, and pressure injuries to his left posterior lower leg and left heel.  At that time, it was discovered that the patient had retained VAC foam embedded in the wound bed of the sacral wound.  Attempts were made to get him back to his plastic surgeon, though unsuccessful.  In January he underwent surgical removal of VAC sponge along with excisional debridement of his sacral wound by Dr. Chaves.  After the surgery, his wound went on to heal without incident.   In early April 2022, his home health nurse noted a new sacral ulcer, below the previous ulcer which quickly tripled in size over the following weeks.  The ulcer to his left medial lower leg had also deteriorated, with bone visible at the base..  He was hospitalized from 4/22 until 4/27/2022 and underwent surgery with Dr. Raman on 4/26 for irrigation and debridement of multiple compartments of  the left lower extremity, bone excision, and complex closure of chronic wound using biologic skin substitute.   His sacrococcygeal wound was not surgically addressed during this admission.  He was discharged back to his group home, with home health, and referral to outpatient wound clinic for his sacral wound.  He was instructed to follow-up with his surgeon for his lower leg wound.       Postoperatively, the left medial lower leg incision dehisced.  He was seen by his surgeon at [facility] on 5/11.  The surgeon opted to leave remaining sutures in place, and refer him to the wound clinic for treatment of this wound.   Treatment of this wound was initiated in clinic on 5/12.  During this visit was also noted that his heel DTI had resolved, but that he had a new pressure injury to his left posterior lower extremity.     A new pressure injury was noted to patient's right upper buttock/lower back on 5/20/2022.  Wound was linear in shape, skin discolored but intact.     Abrasion noted to left anterior lower leg.  First observed in clinic on 7/22/2022.  Patient states he bumped his leg into his food tray.     Small DTI noted to patient's left lateral lower leg on 7/29/2022.  Skin intact but discolored.     Large area of deep tissue injury noted to patient's left exterior lower leg.  Patient denied any trauma to this area.  Skin intact.  Wound documented.    1/27/2023: [place] [facility] [place] with gross hematuria. He underwent RICHARD which showed watchman device was in place and he was taken off of Xarelto. While hospitalized wound team was consulted. He was referred back to [facility] and home health upon discharge.    Patient was hospitalized at [facility] for pyelonephritis from 2/26 until 3/2/2023, admitted for fever and general malaise.  He was admitted and initially started on linezolid and meropenem for suspected UTI and history of multidrug-resistant organisms.  Urine cultures were negative. ID was consulted,  recommended CT of chest and abdomen,which were negative for acute findings. However, he was treated with 5 days total course of antibiotics for suspected UTI, and symptoms completely resolved.  During this admission, the inpatient wound team was consulted for treatment of his sacral and lower leg wounds.  A wound culture was taken from his left heel pressure ulcer, negative.  Once stabilized, he was discharged home and referred back to [facility] to resume treatment of his wounds.    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  Never used smokeless tobacco    Patient's problem list, allergies, and current medications reviewed and updated in Epic    Interval History:  Interval History thinned 7/29/2022.  Please see previous notes for complete interval history.     Interval History thinned 1/27/2023. Please see previous note for complete interval history.    Interval History thinned 3/3/2023.  Please see previous notes for complete interval history.      Interval History thinned 8/4/2023.  Please see previous notes for complete interval history.      7/28/2023: Clinic visit with Steve Hodges MD. Patient reports being in normal state of health. Denies any current issues. His lower wounds are largely unchanged. Sacrum continues to enlarge, he reports that he has been up in chair more frequently this week. Patient counseled on risks of enlarging pressure injury including risk that wound may progress, known OM may worsen or expand including causing illness. He is aware of risks and possible other treatment options, he would like to proceed with current treatment.    8/4/2023 : Clinic visit with  ADILSON Arthur FNP-BC, LILIYA VALERIO.  Anjum states he is feeling well overall.  Left medial lower leg wound has decreased in area significantly.  However, a new area has opened just below sacral ulcer,  from an ulcer by thin skin bridge.  Patient denies any changes to his usual activity.  His left heel wound remains healed, however he does have some slightly denuded skin to the heel.  He was seen by his podiatrist earlier this week, dressing was applied, possibly causing slight maceration.    8/11/2023 : Clinic visit with ADILSON Arthur FNP-BC, LILIYA VALERIO.   Anjum continues to feel well.  Unfortunately, left plantar foot lateral foot wound has reopened slightly.  The skin to his heel is slightly macerated, home health is using smaller heel foam dressing .  Sacrum measures about the same.  There is some friable red tissue along superior wound edge that was treated with AgNO3 today.    8/18/2023 : Clinic visit with ADILSON Arthur FNP-BC, LILIYA VALERIO.   Anjum states he is feeling well.  The plantar foot wound has deteriorated somewhat, area is increased.  Sacral wound is about the same.  Left lower leg wounds are both smaller, though with friable tissue.   Anjum's wounds have been essentially stable for a long time.  We discussed decreasing frequency of clinic visits to every 2 weeks.  Patient is agreeable to this plan.  He understands that we can resume weekly appointments if his wounds deteriorate.    9/1/2023 : Clinic visit with ADILSON Arthur FNP-BC, IMAN, LILIYA.   Anjum states he is in his usual state of health.  Unfortunately, his left heel wound has reopened, and his plantar foot wound has deteriorated.  The medial leg wound has again nearly closed, though tissue is boggy and will likely reopen.  His sacral wound continues to progress, though very slowly.    9/13/2023: Clinic visit with Steve Hodges MD. Patient reports feeling well. Left plantar lateral foot wound is larger with  increased drainage, wound tract that probes towards dorsal foot with some erythema and edema. Concerning for infection. No fluctuance. Patient has been on Augmentin, culture was cancelled previously as he was taking Abx before culture could be obtained. Due to worsening wound today, will add doxycycline to cover MRSA and obtain culture today, though may be sterile as has been on Abx. Sacral pressure injury with breakdown of previous wound site and larger. Patient is not overly concerned and does not want any surgical intervention.    9/18/2023: Clinic visit with Steve Hodges MD. Patient reports feeling in normal state of health. He denies any signs or symptoms of infection currently. Patient left foot wound appears improved. Patient remains on Augmentin. Wound culture was concerning for ESBL Proteus, will discuss case with ID as there are no simple oral options for coverage    9/29/2023 : Clinic visit with ADILSON Arthur, HOLDEN, GEETHA VALERIO   Anjum states he is feeling okay.  His plantar foot wound is again progressing, area has decreased.  The leg wound is nearly resolved, though poor tissue quality as previously noted.  He is left heel wound and left posterior leg wounds are also again resolved.  Unfortunately, his sacrococcygeal wound is larger, now communicates with superior wound.    10/6/2023 : Clinic visit with ADILSON Arthur FNP-BC, LILIYA VALERIO.   Anjum continues to feel well.  His left lower extremity wounds  are unchanged from last visit, left foot wound continues to progress.  Superior sacral wound measures slightly larger, inferior wound slightly smaller.  Patient is still not interested in VAC to heal these wounds.      10/13/2023 : Clinic visit with ADILSON Arthur FNP-BC, GEETHA VALERIO states he is feeling well.  His lower extremity wounds and foot wound are currently progressing well.  His sacral/coccyx wounds are not progressing.  Discussed restarting VAC.  However, would like  to excise skin bridge between the 2 wounds prior.  Patient is agreeable to this idea, however states he has a friend coming in from out of town who will be here for approximately 10 days.  Would like to postpone procedure and restarting of VAC for approximately 2 weeks.    10/20/2023 : Clinic visit with ADILSON Arthur, HOLDEN, IMAN, LILIYA.   Continues to feel well.  His plantar foot wound continues to progress.  Lateral leg wound is a bit larger today, though has tended to wax and wane.  His superior coccyx ulcer actually measures a bit smaller, and depth has decreased, however tissue somewhat boggy.  Inferior coccyx wound measures bit larger.  He still going to consider VAC to his coccyx wound, however wants to wait a few weeks as he has a friend coming into town.  We will revisit this next week.    10/25/2023 : Clinic visit with ADILSON Arthur, HOLDEN, IMAN, LILIYA.   Anjum is in his usual state of health, states he is feeling well overall.  He does have some redness and swelling to his left second toe, toenail is loose.  He states he believes he may have bumped into something, but is not sure when or what.  He does not appear to be a significant wound.  I sent Rx for Augmentin to cover possible infection.  We will monitor this wound each visit.  May need to remove toenail at some point.   His foot wound is again nearly resolved, sacral wounds are about the same.    11/3/2023 : Clinic visit with ADILSON Arthur, HOLDEN, IMAN, LILIYA.   Anjum is feeling well today.  His plantar foot wound is again resolved.  Medial lower leg wound continues to wax and wane with poor tissue quality.  His heel and posterior leg wounds both present with intact skin, though friable.   His left second toenail is loose, first noted last visit.  Toenail removed in clinic today   Both sacral wounds have decreased in area.  Anjum had some concerns about starting VAC.  Given the current progress of these wounds, I do not feel VAC is  necessary.  This can be reconsidered if these wounds stall or deteriorate    11/10/2023 : Clinic visit with ADILSON Arthur, HOLDEN, IMAN, LILIYA.   Anjum continues to feel well.  His plantar foot wound remains resolved, though presents with purple/blue discoloration-bears watching.  Medial leg wound about the same, small open areas with poor tissue quality.  Skin to left heel remains intact but fragile.  Sacral wounds are currently progressing well, though still communicating via tract.  Nailbed of second toe was covered with dry eschar, site of toenail removal last week, appears to be stable.  Small wound to right ischium is slightly worse today.  I checked patient's wheelchair cushion, appears to be in good repair with no deflated areas.    11/17/2023 : Clinic visit with ADILSON Arthur, HOLDEN, IMAN, LILIYA.   Anjum is doing well today.  He states he had a friend come into town this week and spent a lot of time sitting and talking with him.  Surprisingly, his sacral wound did not deteriorate significantly.,  However his right ischial wound has deteriorated, and his posterior leg wound has slightly reopened.      11/22/2023 : Clinic visit with ADILSON Arthur, HOLDEN, IMAN, LILIYA.   Continues to feel well, he is looking forward to spending the Thanksgiving holiday with his family tomorrow.  His wounds continue to wax and wane.  Posterior left lower leg wound is larger today..  Ischial wound and superior sacral wound have also increased in area since last assessment.  He denies sitting any more than usual.  His pressure-relief cushion on his wheelchair is in good repair.      REVIEW OF SYSTEMS:   Unchanged from previous wound clinic assessment on 11/17/2023, except as noted in interval history above     PHYSICAL EXAMINATION:   /58   Pulse (!) 49   Temp 36.8 °C (98.2 °F) (Temporal)   Resp 20   SpO2 97%   Physical Exam  Constitutional:       Appearance: He is obese.   Cardiovascular:      Rate and  Rhythm: Normal rate.   Pulmonary:      Effort: Pulmonary effort is normal.   Abdominal:      Comments: Colostomy left lower quadrant   Genitourinary:     Comments: Suprapubic catheter to down drain   Skin:     Comments: Stage IV coccygeal pressure injury -2 wounds, superior and inferior.  Superior wound has increased in area, new small satellite lesion.  Inferior wound area has decreased.  2 wounds still communicate via tract.  Moderate serosanguineous drainage, no evidence of infection      Full-thickness wound to left medial lower leg -wounds continue to wax and wane, poor tissue quality    Stage III pressure injury left posterior heel -remains healed, though high risk for recurrence    Stage IV pressure injury plantar foot -skin remains intact with discoloration, high risk for recurrence    Right ischium pressure injury, shallow stage III-wound area and depth have increased since last assessment, no slough to wound bed, minimal to moderate serosanguineous drainage, no evidence of infection    Stage III pressure injury to posterior left lower leg-wound area has increased in size assessment, slough to wound bed, moderate serosanguineous drainage, no evidence of infection    Left second toe nailbed-remains covered with stable dry eschar, no drainage, no periwound erythema or induration     Neurological:      Mental Status: He is alert and oriented to person, place, and time.   Psychiatric:         Mood and Affect: Mood normal.         WOUND ASSESSMENT  Wound 06/18/23 Pressure Injury Coccyx Stage IV Inferior wound (Active)   Wound Image    11/22/23 1600   Site Assessment Red;Boggy;Fragile 11/22/23 1600   Periwound Assessment Intact;Scar tissue 11/22/23 1600   Margins Epibole (rolled edges);Unattached edges 11/22/23 1600   Closure Secondary intention 11/22/23 1600   Drainage Amount Moderate 11/22/23 1600   Drainage Description Serosanguineous 11/22/23 1600   Treatments Cleansed;Provider debridement 11/22/23 1600    Wound Cleansing Hypochlorus Acid 11/22/23 1600   Periwound Protectant Skin Protectant Wipes to Periwound 11/22/23 1600   Dressing Changed Changed 11/22/23 1600   Dressing Cleansing/Solutions Not Applicable 11/22/23 1600   Dressing Options Hydrofiber Silver;Offloading Dressing - Sacral 11/22/23 1600   Dressing Change/Treatment Frequency Monday, Wednesday, Friday, and As Needed 11/22/23 1600   Wound Team Following Weekly 11/22/23 1600   WOUND NURSE ONLY - Pressure Injury Stage 4 11/22/23 1600   Wound Length (cm) 4.3 cm 11/22/23 1600   Wound Width (cm) 0.8 cm 11/22/23 1600   Wound Depth (cm) 0.3 cm 11/22/23 1600   Wound Surface Area (cm^2) 3.44 cm^2 11/22/23 1600   Wound Volume (cm^3) 1.032 cm^3 11/22/23 1600   Post-Procedure Length (cm) 4.4 cm 11/22/23 1600   Post-Procedure Width (cm) 0.8 cm 11/22/23 1600   Post-Procedure Depth (cm) 0.3 cm 11/22/23 1600   Post-Procedure Surface Area (cm^2) 3.52 cm^2 11/22/23 1600   Post-Procedure Volume (cm^3) 1.056 cm^3 11/22/23 1600   Wound Healing % 72 11/22/23 1600   Tunneling (cm) 1.2 cm 11/22/23 1600   Tunneling Clock Position of Wound 7 11/22/23 1600   Undermining (cm) 0 cm 11/22/23 1600   Undermining of Wound, 1st Location From 6 o'clock;To 7 o'clock 11/03/23 1515   Undermining (cm) - 2nd location 3.1 cm 09/18/23 1200   Undermining of Wound, 2nd Location From 11 o'clock;From 1 o'clock 09/18/23 1200   Wound Odor None 11/22/23 1600   Exposed Structures None 11/22/23 1600   Number of days: 157       Wound 06/18/23 Full Thickness Wound Leg Medial Left (Active)   Wound Image   11/22/23 1600   Site Assessment Red;Fragile 11/22/23 1600   Periwound Assessment Blanchable erythema;Hyperpigmented 11/22/23 1600   Margins Attached edges 11/22/23 1600   Drainage Amount Moderate 11/22/23 1600   Drainage Description Serosanguineous 11/22/23 1600   Treatments Cleansed 11/22/23 1600   Wound Cleansing Hypochlorus Acid 11/22/23 1600   Periwound Protectant Skin Protectant Wipes to Periwound  11/22/23 1600   Dressing Changed Changed 11/22/23 1600   Dressing Cleansing/Solutions Not Applicable 11/22/23 1600   Dressing Options Hydrofiber Silver;Offloading Dressing - Sacral 11/22/23 1600   Dressing Change/Treatment Frequency Monday, Wednesday, Friday, and As Needed 11/22/23 1600   Wound Team Following Weekly 11/22/23 1600   Non-staged Wound Description Full thickness 11/22/23 1600   Wound Length (cm) 1 cm 11/22/23 1600   Wound Width (cm) 2.3 cm 11/22/23 1600   Wound Depth (cm) 0.2 cm 11/22/23 1600   Wound Surface Area (cm^2) 2.3 cm^2 11/22/23 1600   Wound Volume (cm^3) 0.46 cm^3 11/22/23 1600   Post-Procedure Length (cm) 1 cm 10/20/23 1500   Post-Procedure Width (cm) 2.5 cm 10/20/23 1500   Post-Procedure Depth (cm) 0.3 cm 10/20/23 1500   Post-Procedure Surface Area (cm^2) 2.5 cm^2 10/20/23 1500   Post-Procedure Volume (cm^3) 0.75 cm^3 10/20/23 1500   Wound Healing % -53 11/22/23 1600   Tunneling (cm) 0 cm 11/22/23 1600   Undermining (cm) 0 cm 11/22/23 1600   Wound Odor None 11/22/23 1600   Exposed Structures None 11/22/23 1600   Number of days: 157       Wound 06/18/23 Pressure Injury Leg Posterior Left resolved 9/1/23, re-opened 11/17/23 (Active)   Wound Image    11/22/23 1600   Site Assessment Red 11/22/23 1600   Periwound Assessment Blanchable erythema;Intact;Fragile 11/22/23 1600   Margins Attached edges 11/22/23 1600   Closure Secondary intention 11/22/23 1600   Drainage Amount Moderate 11/22/23 1600   Drainage Description Serosanguineous 11/22/23 1600   Treatments Cleansed;Provider debridement 11/22/23 1600   Wound Cleansing Hypochlorus Acid 11/22/23 1600   Periwound Protectant Skin Protectant Wipes to Periwound;Barrier Paste 11/22/23 1600   Dressing Changed Changed 11/22/23 1600   Dressing Cleansing/Solutions Not Applicable 11/22/23 1600   Dressing Options Hydrofiber Silver;Offloading Dressing - Sacral 11/22/23 1600   Dressing Change/Treatment Frequency Every 72 hrs, and As Needed 11/22/23 1600    Wound Team Following Weekly 11/22/23 1600   WOUND NURSE ONLY - Pressure Injury Stage 3 11/22/23 1600   Non-staged Wound Description Not applicable 11/22/23 1600   Wound Length (cm) 1.3 cm 11/22/23 1600   Wound Width (cm) 1.5 cm 11/22/23 1600   Wound Depth (cm) 0.2 cm 11/22/23 1600   Wound Surface Area (cm^2) 1.95 cm^2 11/22/23 1600   Wound Volume (cm^3) 0.39 cm^3 11/22/23 1600   Post-Procedure Length (cm) 1.4 cm 11/22/23 1600   Post-Procedure Width (cm) 1.5 cm 11/22/23 1600   Post-Procedure Depth (cm) 0.2 cm 11/22/23 1600   Post-Procedure Surface Area (cm^2) 2.1 cm^2 11/22/23 1600   Post-Procedure Volume (cm^3) 0.42 cm^3 11/22/23 1600   Wound Healing % 61 11/22/23 1600   Tunneling (cm) 0 cm 11/22/23 1600   Undermining (cm) 0 cm 11/22/23 1600   Wound Odor None 11/22/23 1600   Exposed Structures None 11/22/23 1600   Number of days: 157       Wound 08/11/23 Full Thickness Wound Foot Lateral Left (Active)   Wound Image   11/22/23 1600   Site Assessment Purple;Epithelialization 11/22/23 1600   Periwound Assessment Intact;Edema;Fragile;Flaky 11/22/23 1600   Margins Attached edges 11/17/23 1500   Drainage Amount None 11/22/23 1600   Drainage Description Serosanguineous 11/17/23 1500   Treatments Cleansed 11/22/23 1600   Wound Cleansing Hypochlorus Acid 11/22/23 1600   Periwound Protectant Skin Protectant Wipes to Periwound;Barrier Paste 11/22/23 1600   Dressing Status Clean;Intact 08/11/23 1500   Dressing Changed New 11/22/23 1600   Dressing Cleansing/Solutions Not Applicable 11/22/23 1600   Dressing Options Silicone Adhesive Foam;Tubigrip;Other (Comments) 11/22/23 1600   Dressing Change/Treatment Frequency Every 72 hrs, and As Needed 11/22/23 1600   Wound Team Following Weekly 11/22/23 1600   Non-staged Wound Description Full thickness 11/22/23 1600   Wound Length (cm) 0.2 cm 11/17/23 1500   Wound Width (cm) 0.3 cm 11/17/23 1500   Wound Depth (cm) 0.2 cm 11/17/23 1500   Wound Surface Area (cm^2) 0.06 cm^2 11/17/23 1500    Wound Volume (cm^3) 0.012 cm^3 11/17/23 1500   Post-Procedure Length (cm) 0.5 cm 10/25/23 1500   Post-Procedure Width (cm) 0.6 cm 10/25/23 1500   Post-Procedure Depth (cm) 0.1 cm 10/25/23 1500   Post-Procedure Surface Area (cm^2) 0.3 cm^2 10/25/23 1500   Post-Procedure Volume (cm^3) 0.03 cm^3 10/25/23 1500   Wound Healing % 50 11/17/23 1500   Tunneling (cm) 0 cm 11/17/23 1500   Tunneling Clock Position of Wound 5 10/06/23 1500   Undermining (cm) 0 cm 11/17/23 1500   Undermining of Wound, 1st Location From 5 o'clock;To 7 o'clock 10/25/23 1500   Wound Odor None 11/22/23 1600   Exposed Structures None 11/22/23 1600   Number of days: 103       Wound 09/13/23 Pressure Injury Coccyx Superior (Active)   Wound Image    11/22/23 1600   Site Assessment Red;Fragile 11/22/23 1600   Periwound Assessment Scar tissue;Intact 11/22/23 1600   Margins Unattached edges;Epibole (rolled edges) 11/22/23 1600   Drainage Amount Moderate 11/22/23 1600   Drainage Description Serosanguineous 11/22/23 1600   Treatments Cleansed;Provider debridement 11/22/23 1600   Wound Cleansing Hypochlorus Acid 11/22/23 1600   Periwound Protectant Skin Protectant Wipes to Periwound;Barrier Paste 11/22/23 1600   Dressing Status Old drainage 11/03/23 1515   Dressing Changed Changed 11/22/23 1600   Dressing Cleansing/Solutions Not Applicable 11/22/23 1600   Dressing Options Hydrofiber Silver Strip;Hydrofiber Silver;Offloading Dressing - Sacral 11/22/23 1600   Dressing Change/Treatment Frequency Monday, Wednesday, Friday, and As Needed 11/22/23 1600   Wound Team Following Weekly 11/22/23 1600   WOUND NURSE ONLY - Pressure Injury Stage 4 11/22/23 1600   Non-staged Wound Description Not applicable 11/22/23 1600   Wound Length (cm) 1.3 cm 11/22/23 1600   Wound Width (cm) 0.6 cm 11/22/23 1600   Wound Depth (cm) 0.9 cm 11/22/23 1600   Wound Surface Area (cm^2) 0.78 cm^2 11/22/23 1600   Wound Volume (cm^3) 0.702 cm^3 11/22/23 1600   Post-Procedure Length (cm) 1.4 cm  11/22/23 1600   Post-Procedure Width (cm) 0.6 cm 11/22/23 1600   Post-Procedure Depth (cm) 0.9 cm 11/22/23 1600   Post-Procedure Surface Area (cm^2) 0.84 cm^2 11/22/23 1600   Post-Procedure Volume (cm^3) 0.756 cm^3 11/22/23 1600   Wound Healing % -30 11/22/23 1600   Tunneling (cm) 2 cm 11/22/23 1600   Tunneling Clock Position of Wound 6 11/10/23 1400   Undermining (cm) 0 cm 11/22/23 1600   Undermining of Wound, 1st Location From 7 o'clock;To 9 o'clock 10/25/23 1500   Wound Odor None 11/22/23 1600   Exposed Structures None 11/22/23 1600   Number of days: 70       Wound 10/25/23 Pressure Injury Right ischial tuberosity (Active)   Wound Image    11/22/23 1600   Site Assessment Yellow;Red 11/22/23 1600   Periwound Assessment Blanchable erythema 11/22/23 1600   Margins Attached edges 11/22/23 1600   Closure Secondary intention 11/22/23 1600   Drainage Amount Moderate 11/22/23 1600   Drainage Description Serosanguineous 11/22/23 1600   Treatments Cleansed;Provider debridement 11/22/23 1600   Wound Cleansing Hypochlorus Acid 11/22/23 1600   Periwound Protectant Skin Protectant Wipes to Periwound;Barrier Paste 11/22/23 1600   Dressing Status Clean;Dry;Intact 11/17/23 1500   Dressing Changed Changed 11/22/23 1600   Dressing Cleansing/Solutions Not Applicable 11/22/23 1600   Dressing Options Hydrofiber Silver;Offloading Dressing - Sacral 11/22/23 1600   Dressing Change/Treatment Frequency Monday, Wednesday, Friday, and As Needed 11/22/23 1600   Wound Team Following Weekly 11/22/23 1600   WOUND NURSE ONLY - Pressure Injury Stage 3 11/22/23 1600   Wound Length (cm) 1.5 cm 11/22/23 1600   Wound Width (cm) 0.7 cm 11/22/23 1600   Wound Depth (cm) 0.2 cm 11/22/23 1600   Wound Surface Area (cm^2) 1.05 cm^2 11/22/23 1600   Wound Volume (cm^3) 0.21 cm^3 11/22/23 1600   Post-Procedure Length (cm) 1.6 cm 11/22/23 1600   Post-Procedure Width (cm) 0.7 cm 11/22/23 1600   Post-Procedure Depth (cm) 0.2 cm 11/22/23 1600   Post-Procedure  Surface Area (cm^2) 1.12 cm^2 11/22/23 1600   Post-Procedure Volume (cm^3) 0.224 cm^3 11/22/23 1600   Wound Healing % -2525 11/22/23 1600   Tunneling (cm) 0 cm 11/22/23 1600   Undermining (cm) 0 cm 11/22/23 1600   Wound Odor None 11/22/23 1600   Exposed Structures None 11/22/23 1600   Number of days: 28       Wound 10/25/23 Full Thickness Wound Toe, 2nd Dorsal Left Cuticle (Active)   Wound Image   11/22/23 1600   Site Assessment Dry;Eschar 11/22/23 1600   Periwound Assessment Intact;Edema 11/22/23 1600   Margins Attached edges 11/22/23 1600   Drainage Amount None 11/22/23 1600   Drainage Description Serosanguineous 11/03/23 1515   Treatments Cleansed 11/22/23 1600   Wound Cleansing Hypochlorus Acid 11/22/23 1600   Periwound Protectant Not Applicable 11/22/23 1600   Dressing Status Open to Air 11/22/23 1600   Dressing Cleansing/Solutions Not Applicable 11/22/23 1600   Dressing Options Open to Air 11/22/23 1600   Dressing Change/Treatment Frequency Monday, Wednesday, Friday, and As Needed 11/22/23 1600   Wound Team Following Weekly 11/22/23 1600   Non-staged Wound Description Full thickness 11/22/23 1600   Wound Length (cm) 0.1 cm 11/03/23 1515   Wound Width (cm) 0.5 cm 11/03/23 1515   Wound Depth (cm) 0.1 cm 11/03/23 1515   Wound Surface Area (cm^2) 0.05 cm^2 11/03/23 1515   Wound Volume (cm^3) 0.005 cm^3 11/03/23 1515   Post-Procedure Length (cm) 1 cm 11/03/23 1515   Post-Procedure Width (cm) 1.5 cm 11/03/23 1515   Post-Procedure Depth (cm) 0.1 cm 11/03/23 1515   Post-Procedure Surface Area (cm^2) 1.5 cm^2 11/03/23 1515   Post-Procedure Volume (cm^3) 0.15 cm^3 11/03/23 1515   Wound Healing % 17 11/03/23 1515   Tunneling (cm) 0 cm 11/17/23 1500   Undermining (cm) 0 cm 11/17/23 1500   Wound Odor None 11/22/23 1600   Exposed Structures KEN 11/22/23 1600   Number of days: 28       PROCEDURE: Excisional debridement of sacral / coccygeal pressure injury-wounds x 2  -2% viscous lidocaine applied topically to wound beds for  approximately 5 minutes prior to debridement  -Curette used to debride wound bed.  Excisional debridement was performed to remove devitalized tissue until healthy, bleeding tissue was visualized.  Total wound area debrided 4.36 cm² including into the wound tunnel.  Tissue debrided into the muscle / fascia layer.    -Bleeding controlled with manual pressure.    -Wound care completed by wound RN, refer to flowsheet  -Patient tolerated the procedure well, without c/o pain or discomfort.      PROCEDURE: Excisional debridement of left posterior leg ulcer, and right ischial ulcer  -2% viscous lidocaine applied topically to wound bed for approximately 5 minutes prior to debridement  -Curette used to debride wound bed.  Excisional debridement was performed to remove devitalized tissue until healthy, bleeding tissue was visualized.   Entire surface of wound, 3.22cm² debrided.  Tissue debrided into the subcutaneous layer.    -Bleeding controlled with manual pressure.    -Wound care completed by wound RN, refer to flowsheet  -Patient tolerated the procedure well, without c/o pain or discomfort.        No debridement of left second toe wound, right ischium, left heel, left medial lower leg, or left posterior leg wounds required today.    BIOLOGIC LOG  5/20/2022-first application.  PRODUCT: EpiCord 2 x 3 cm . PRODUCT #[id number]; EXPIRES: 2026-12-01 5/27/2022- second application.  PRODUCT: EpiCordEX 2 x 3 cm . PRODUCT #[id number]; EXPIRES: 2026-09-01    6/3/2022- third application.  PRODUCT: EpiCordEX 2 x 3 cm . PRODUCT #[id number]; EXPIRES: 2026-10-01    6/10/2022- fourth application.  PRODUCT: EpiCordEX 2 x 3 cm . PRODUCT #[id number]; EXPIRES: 2026-09-01 6/17/2022- fifth application.  PRODUCT: EpiCordEX 2 x 3 cm . PRODUCT #EX [id number]; EXPIRES: 2027-02-01 6/24/2022- sixth application.  PRODUCT: EpiCordEX 2 x 3 cm . PRODUCT #EX [id number]; EXPIRES: 2027-02-01 07/01/22:  "Seventh application.  Product: Epicort Dx 2.0 x 3.0 cm reorder number [id number]; product number [id number]; expires 2027-02-01 7/22/2022- eighth application.  PRODUCT: EpiCord 2 x 3 cm, reorder #[id number]. PRODUCT #[id number]; EXPIRES: 2027-02-01      Pertinent Labs and Diagnostics:    Labs:     A1c: No results found for: \"HBA1C\"     [facility] results, 7/1/2022 (under media tab)    CRP 13    ESR 31      IMAGING:     10/3/2022-CT of pelvis with contrast  IMPRESSION:     1.  Posterior soft tissue sacral wound and 1.5 x 3.7 cm abscess.   2.  Progressive destruction of the distal sacrum and proximal coccyx. Sclerosis and irregularity of the distal sacrum consistent with osteomyelitis.   3.  Old wound within the soft tissues posterior to the distal left SI joint with possible fistulous communication.    10/3/2022-CT of tib-fib with and without contrast, with reconstruction  IMPRESSION:     1.  Old fractures of the left tibia and fibula with extensive callus formation and bony bridging as well as extensive dystrophic calcifications. Similar to previous.   2.  Distal medial soft tissue wounds with ill-defined superficial in the soft tissue thickening and fluid collections suggestive of phlegmon. No organized abscess identified.   3.  No definite osteomyelitis.   4.  Arthritic changes.        VASCULAR STUDIES: No results found.    LAST  WOUND CULTURE:   Lab Results   Component Value Date/Time    CULTRSULT (A) 09/13/2023 04:10 PM     Growth noted after further incubation, see below for  organism identification.  Light growth usual skin ade.      CULTRSULT (A) 09/13/2023 04:10 PM     Proteus mirabilis ESBL  Light growth  Extended Spectrum Beta-lactamase (ESBL) isolated.  ESBL's may be clinically resistant to therapy with  Penicillins,Cephalosporins or Aztreonam despite  apparent in vitro susceptibility to some of these agents.  The patient requires contact isolation.  Please contact pharmacy or an Infectious " Disease Specialist  if you have any questions about appropriate therapy.      CULTRSULT Klebsiella pneumoniae  Light growth   (A) 09/13/2023 04:10 PM    CULTRSULT (A) 09/13/2023 04:10 PM     Methicillin Resistant Staphylococcus aureus  Light growth        PATHOLOGY  2/17/2023-bone fragment extracted from left lower extremity wound\\  FINAL DIAGNOSIS:     A. Left leg bone fragment at base of chronic wound:          Extensively degenerated fibrocartilaginous tissue with a rim of           fibrinopurulent debris          Correlate with culture findings            Comment: While no residual intact bone is identified, these           findings are suggestive of adjacent osteomyelitis.      ASSESSMENT AND PLAN:     1. Sacral decubitus ulcer, stage IV ([facility])  Comments: Ulcer first noted in early April 2022 as small open area which quickly enlarged.  This ulcer is present distal from previous sacral ulcer which healed after surgery in January.  Patient has history of flap reconstruction x2 to this area.  CT shows progressive destruction of distal sacrum and proximal coccyx.    11/23/2022: Area of superior wound has increased, new satellite wound.  Inferior wound measures smaller today  - Excisional debridement was performed in clinic, medically necessary to promote wound healing.   -Patient to return to clinic weekly for assessment and debridement  -Home health to change dressing 2 times per week   -Patient does spend a lot of time up in his wheelchair, and wishes to continue to do so for his quality of life.  He lives independently, drives, and is involved with family activities.  He does have an appropriate pressure-relief cushion and is very well versed on pressure relieving techniques.  - Patient does not currently have follow up with Dr. Saini. If wound deteriorates or fails to improve can consider re-establishing with Dr. Saini for evaluation for coverage options. Patient does not want to pursue at this time and is  happy with current conservative approach even with wound worsening.  - Known OM that has already been treated. No utility of Abx unless gross soft tissue infection which does not appear to be present today.    Wound care: Collagen, Hydrofiber silver strip, hydrofiber silver, Mepilex    2. Postoperative wound dehiscence, subsequent encounter  3. Osteomyelitis of left lower extremity  Comments: On 4/26/2022 patient underwent irrigation and debridement of multiple compartments of the left lower extremity states for pressure injury, with bone excision, and complex closure of chronic wound using biologic skin substitute.  During postop visit in surgeons office on 5/11, surgical site was noted to be dehisced.  Patient was referred to wound clinic for management.    11/23/2022: Presents today as several punctate wounds with hypertrophic tissue, friable.  -No excisional debridement of this wound today  -Patient to return to clinic weekly for assessment and debridement as needed  -Home health to change dressing 1-2 times per week in between clinic visits OM.  -Suspect underlying OM, however patient does not wish to consider surgical options at this time nor does his surgeon wish to do any further surgery to this leg. 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 There is no gross soft tissue infection and repeated Abx treatment without source control is not indicated.    -We will assess these wounds each clinic visit  -Patient to be mindful of offloading when supine, should assure that his leg is not rotating medially  Wound care: Hydrofiber silver, silicone adhesive foam, tubigrip    4. Deep tissue injury  5. Pressure injury of left foot, stage IV  Comments: DTI to posterior left lower leg first observed in clinic 7/29/2022.  Patient does not know how it started, had been wearing heel float boot consistently.  Evolved into stage IV pressure injury    11/23/2023: Plantar foot wound remains closed with  blue/purple discoloration of skin.  Possible DTI.  Left posterior leg wound, previously intact reopened on 11/17/2023.  Measures larger today  -Excisional debridement of posterior leg wound in clinic today, medically necessary to promote wound healing.  -Patient to return to clinic weekly for assessment and debridement  -Home health to change dressing 1-2 times per week in between clinic visits        Wound care-posterior leg wound: Silver Hydrofiber to manage exudate and bioburden, sacral foam cover dressing,    6. Pressure injury of left heel, stage 3 ([facility])  Comments: First noted in clinic on 10/21/2022, originally noted to be stage II    11/23/2023: Skin remains intact though friable  -We will continue to monitor wound each visit, high risk for recurrence  - Patient continue wearing heel float boots at all times  - Heel does not appear to contact any solid surfaces while in wheelchair   Wound Care: Heel Mepilex to reduce pressure and friction    7.  Pressure injury of right buttock/ischium, stage III    11/23/2023: Wound area has increased in size assessment, slough to wound bed.  -Excisional debridement of wound in clinic today, medically necessary to promote wound healing.  -Patient to return to clinic weekly for assessment and debridement  -Patient to change dressing 1-2 times per week in between clinic visits     Wound care: Silver Hydrofiber to manage exudate and bioburden, silicone foam cover dressing    8. Quadriplegia, C5-C7, incomplete ([facility])  Comments: Complicating factor.  Impaired mobility and sensation  -Patient is still spending 7-8 hours/day up in his wheelchair, though he does have appropriate offloading cushion, and knows to reposition frequently.  Wears heel float boots bilaterally at all times    9.  Injury of toenail of left foot, subsequent encounter  10.  Infected wound    11/23/2023: Eschar to nailbed remains stable.  No evidence of infection  -Monitor site each clinic visit  -Paint daily  with Betadine to keep stable and dry          Please note that this note may have been created using voice recognition software. I have worked with technical experts from [facility] to optimize the interface.  I have made every reasonable attempt to correct obvious errors, but there may be errors of grammar and possibly content that I did not discover before finalizing the note.

## 2023-11-23 NOTE — PATIENT INSTRUCTIONS
-Keep dressings clean and dry. Change dressings every 3-4 days, and if they become over saturated, soiled or fall off.     -If you need to change your dressings at home: Wash your wound with normal saline, wound cleanser, or unscented soap and water prior to applying your new dressings. Please avoid cleansing with hydrogen peroxide or rubbing alcohol.    -Avoid prolonged sitting.    -Remove your compression garments if you have severe pain, severe swelling, numbness, color change, or temperature change in your toes. If you need to remove your compression garments, do so by unrolling them. Do not cut the compression garments off, this is to prevent cutting yourself on accident.    -Should you experience any significant changes in your wound(s), such as signs of infection (increasing redness, swelling, localized heat, increased pain, fever > 101 F, chills) or have any questions regarding your home care instructions, please contact the wound center at [phone]. If after hours, contact your primary care physician or go to the hospital emergency room.     -If you are 5 or more minutes late for an appointment, we reserve the right to cancel and reschedule that appointment. Additionally, if you are habitually late or not showing (3 late cancellations and/or no shows), we reserve the right to cancel your remaining appointments and it will be your responsibility to obtain a new referral if services are still needed.

## 2023-11-29 ENCOUNTER — OFFICE VISIT (OUTPATIENT)
Dept: WOUND CARE | Facility: MEDICAL CENTER | Age: 73
End: 2023-11-29
Attending: INTERNAL MEDICINE
Payer: MEDICARE

## 2023-11-29 VITALS
RESPIRATION RATE: 18 BRPM | HEART RATE: 50 BPM | TEMPERATURE: 98.1 F | DIASTOLIC BLOOD PRESSURE: 58 MMHG | SYSTOLIC BLOOD PRESSURE: 114 MMHG | OXYGEN SATURATION: 94 %

## 2023-11-29 DIAGNOSIS — M86.9 OSTEOMYELITIS OF LEFT LOWER EXTREMITY (HCC): ICD-10-CM

## 2023-11-29 DIAGNOSIS — L89.313 PRESSURE INJURY OF RIGHT ISCHIUM, STAGE 3 (HCC): ICD-10-CM

## 2023-11-29 DIAGNOSIS — L89.894 PRESSURE INJURY OF LEFT FOOT, STAGE 4 (HCC): ICD-10-CM

## 2023-11-29 DIAGNOSIS — L89.154 SACRAL DECUBITUS ULCER, STAGE IV (HCC): ICD-10-CM

## 2023-11-29 DIAGNOSIS — T81.31XD POSTOPERATIVE WOUND DEHISCENCE, SUBSEQUENT ENCOUNTER: ICD-10-CM

## 2023-11-29 DIAGNOSIS — T14.8XXA DEEP TISSUE INJURY: ICD-10-CM

## 2023-11-29 DIAGNOSIS — G82.53 QUADRIPLEGIA, C5-C7, COMPLETE (HCC): ICD-10-CM

## 2023-11-29 DIAGNOSIS — L89.623 PRESSURE INJURY OF LEFT HEEL, STAGE 3 (HCC): ICD-10-CM

## 2023-11-29 DIAGNOSIS — L89.890 PRESSURE INJURY OF LEFT LEG, UNSTAGEABLE (HCC): ICD-10-CM

## 2023-11-29 DIAGNOSIS — S99.922D INJURY OF TOENAIL OF LEFT FOOT, SUBSEQUENT ENCOUNTER: ICD-10-CM

## 2023-11-29 PROCEDURE — 11042 DBRDMT SUBQ TIS 1ST 20SQCM/<: CPT

## 2023-11-29 PROCEDURE — 11043 DBRDMT MUSC&/FSCA 1ST 20/<: CPT

## 2023-11-29 PROCEDURE — 3074F SYST BP LT 130 MM HG: CPT | Performed by: NURSE PRACTITIONER

## 2023-11-29 PROCEDURE — 11043 DBRDMT MUSC&/FSCA 1ST 20/<: CPT | Performed by: NURSE PRACTITIONER

## 2023-11-29 PROCEDURE — 3078F DIAST BP <80 MM HG: CPT | Performed by: NURSE PRACTITIONER

## 2023-11-29 PROCEDURE — 11042 DBRDMT SUBQ TIS 1ST 20SQCM/<: CPT | Mod: 59 | Performed by: NURSE PRACTITIONER

## 2023-11-29 NOTE — PROGRESS NOTES
Provider Encounter- Pressure Injury        HISTORY OF PRESENT ILLNESS  Wound History:    START OF CARE IN CLINIC: 6/23/2023 (return to clinic after hospitalization)    REFERRING PROVIDER: Sahara Mendez      WOUNDS- Pressure Injury, Sacrococcygeal, stage IV                                                             Surgical wound dehiscence, left medial lower leg-status post surgical I&D of stage IV pressure injury and           biologic application                    Pressure injury, DTI, left posterior lower leg-first observed in clinic 7/29/2022       Pressure injury stage III L heel - first noted 10/21     Left 2nd toe avulsion (previously erroneously documented as a right)- first noted 10/21     Pressure injury to right inferior buttock/ischium-first observed in clinic 10/25/2023      HISTORY: Patient with history of incomplete quadriplegia referred to [facility] for treatment of a stage IV pressure injury.  He has a history of previous pressure injuries to this area, and underwent muscle flaps in 2019, and then again in 2020.  He was seen in the wound clinic in November 2021 for an ulcer proximal from his current ulcer, and pressure injuries to his left posterior lower leg and left heel.  At that time, it was discovered that the patient had retained VAC foam embedded in the wound bed of the sacral wound.  Attempts were made to get him back to his plastic surgeon, though unsuccessful.  In January he underwent surgical removal of VAC sponge along with excisional debridement of his sacral wound by Dr. Chaves.  After the surgery, his wound went on to heal without incident.   In early April 2022, his home health nurse noted a new sacral ulcer, below the previous ulcer which quickly tripled in size over the following weeks.  The ulcer to his left medial lower leg had also deteriorated, with bone visible at the base..  He was hospitalized from 4/22 until 4/27/2022 and underwent surgery with Dr. Raman on 4/26 for  irrigation and debridement of multiple compartments of the left lower extremity, bone excision, and complex closure of chronic wound using biologic skin substitute.   His sacrococcygeal wound was not surgically addressed during this admission.  He was discharged back to his group home, with home health, and referral to outpatient wound clinic for his sacral wound.  He was instructed to follow-up with his surgeon for his lower leg wound.       Postoperatively, the left medial lower leg incision dehisced.  He was seen by his surgeon at [facility] on 5/11.  The surgeon opted to leave remaining sutures in place, and refer him to the wound clinic for treatment of this wound.   Treatment of this wound was initiated in clinic on 5/12.  During this visit was also noted that his heel DTI had resolved, but that he had a new pressure injury to his left posterior lower extremity.     A new pressure injury was noted to patient's right upper buttock/lower back on 5/20/2022.  Wound was linear in shape, skin discolored but intact.     Abrasion noted to left anterior lower leg.  First observed in clinic on 7/22/2022.  Patient states he bumped his leg into his food tray.     Small DTI noted to patient's left lateral lower leg on 7/29/2022.  Skin intact but discolored.     Large area of deep tissue injury noted to patient's left exterior lower leg.  Patient denied any trauma to this area.  Skin intact.  Wound documented.    1/27/2023: Patient was admitted to [facility] [place] 1/23-1/25/2023 with gross hematuria. He underwent RICHARD which showed watchman device was in place and he was taken off of Xarelto. While hospitalized wound team was consulted. He was referred back to [facility] and home health upon discharge.    Patient was hospitalized at [facility] for pyelonephritis from 2/26 until 3/2/2023, admitted for fever and general malaise.  He was admitted and initially started on linezolid and meropenem for suspected UTI and history of multidrug-resistant  organisms.  Urine cultures were negative. ID was consulted, recommended CT of chest and abdomen,which were negative for acute findings. However, he was treated with 5 days total course of antibiotics for suspected UTI, and symptoms completely resolved.  During this admission, the inpatient wound team was consulted for treatment of his sacral and lower leg wounds.  A wound culture was taken from his left heel pressure ulcer, negative.  Once stabilized, he was discharged home and referred back to [facility] to resume treatment of his wounds.    Pertinent Medical History: Incomplete quadriplegia, history of stage IV pressure injuries, history of flap procedures to sacral pressure injuries, osteomyelitis, obesity, colostomy in place   Contributing factors: Immobility and Obesity, impaired sensation    Personal support: Attendant-staff at halfway and home health nursing    TOBACCO USE:   Former smoker, quit in 1977.  Never used smokeless tobacco    Patient's problem list, allergies, and current medications reviewed and updated in Epic    Interval History:  Interval History thinned 7/29/2022.  Please see previous notes for complete interval history.     Interval History thinned 1/27/2023. Please see previous note for complete interval history.    Interval History thinned 3/3/2023.  Please see previous notes for complete interval history.      Interval History thinned 8/4/2023.  Please see previous notes for complete interval history.      7/28/2023: Clinic visit with Steve Hodges MD. Patient reports being in normal state of health. Denies any current issues. His lower wounds are largely unchanged. Sacrum continues to enlarge, he reports that he has been up in chair more frequently this week. Patient counseled on risks of enlarging pressure injury including risk that wound may progress, known OM may worsen or expand including causing illness. He is aware of risks and possible other treatment options, he would like to  proceed with current treatment.    8/4/2023 : Clinic visit with ADILSON Arthur FNP-BC, LILIYA VALERIO.  Anjum states he is feeling well overall.  Left medial lower leg wound has decreased in area significantly.  However, a new area has opened just below sacral ulcer,  from an ulcer by thin skin bridge.  Patient denies any changes to his usual activity.  His left heel wound remains healed, however he does have some slightly denuded skin to the heel.  He was seen by his podiatrist earlier this week, dressing was applied, possibly causing slight maceration.    8/11/2023 : Clinic visit with ADILSON Arthur FNP-BC, LILIYA VALERIO.   Anjum continues to feel well.  Unfortunately, left plantar foot lateral foot wound has reopened slightly.  The skin to his heel is slightly macerated, home health is using smaller heel foam dressing .  Sacrum measures about the same.  There is some friable red tissue along superior wound edge that was treated with AgNO3 today.    8/18/2023 : Clinic visit with ADILSON Arthur FNP-BC, LILIYA VALERIO.   Anjum states he is feeling well.  The plantar foot wound has deteriorated somewhat, area is increased.  Sacral wound is about the same.  Left lower leg wounds are both smaller, though with friable tissue.   Anjum's wounds have been essentially stable for a long time.  We discussed decreasing frequency of clinic visits to every 2 weeks.  Patient is agreeable to this plan.  He understands that we can resume weekly appointments if his wounds deteriorate.    9/1/2023 : Clinic visit with ADILSON Arthur FNP-BC, LILIYA VALERIO.   Anjum states he is in his usual state of health.  Unfortunately, his left heel wound has reopened, and his plantar foot wound has deteriorated.  The medial leg wound has again nearly closed, though tissue is boggy and will likely reopen.  His sacral wound continues to progress, though very slowly.    9/13/2023: Clinic visit with Steve Hodges MD. Patient reports  feeling well. Left plantar lateral foot wound is larger with increased drainage, wound tract that probes towards dorsal foot with some erythema and edema. Concerning for infection. No fluctuance. Patient has been on Augmentin, culture was cancelled previously as he was taking Abx before culture could be obtained. Due to worsening wound today, will add doxycycline to cover MRSA and obtain culture today, though may be sterile as has been on Abx. Sacral pressure injury with breakdown of previous wound site and larger. Patient is not overly concerned and does not want any surgical intervention.    9/18/2023: Clinic visit with Steve Hodges MD. Patient reports feeling in normal state of health. He denies any signs or symptoms of infection currently. Patient left foot wound appears improved. Patient remains on Augmentin. Wound culture was concerning for ESBL Proteus, will discuss case with ID as there are no simple oral options for coverage    9/29/2023 : Clinic visit with ADILSON Arthur, HOLDEN, GEETHA VALERIO   Anjum states he is feeling okay.  His plantar foot wound is again progressing, area has decreased.  The leg wound is nearly resolved, though poor tissue quality as previously noted.  He is left heel wound and left posterior leg wounds are also again resolved.  Unfortunately, his sacrococcygeal wound is larger, now communicates with superior wound.    10/6/2023 : Clinic visit with ADILSON Arthur, HOLDEN, IMAN, LILIYA.   Anjum continues to feel well.  His left lower extremity wounds  are unchanged from last visit, left foot wound continues to progress.  Superior sacral wound measures slightly larger, inferior wound slightly smaller.  Patient is still not interested in VAC to heal these wounds.      10/13/2023 : Clinic visit with ADILSON Arthur FNP-BC, GEETHA VALERIO   Anjum states he is feeling well.  His lower extremity wounds and foot wound are currently progressing well.  His sacral/coccyx wounds are not  progressing.  Discussed restarting VAC.  However, would like to excise skin bridge between the 2 wounds prior.  Patient is agreeable to this idea, however states he has a friend coming in from out of town who will be here for approximately 10 days.  Would like to postpone procedure and restarting of VAC for approximately 2 weeks.    10/20/2023 : Clinic visit with ADILSON Arthur, HOLDEN, IMAN, LILIYA.   Continues to feel well.  His plantar foot wound continues to progress.  Lateral leg wound is a bit larger today, though has tended to wax and wane.  His superior coccyx ulcer actually measures a bit smaller, and depth has decreased, however tissue somewhat boggy.  Inferior coccyx wound measures bit larger.  He still going to consider VAC to his coccyx wound, however wants to wait a few weeks as he has a friend coming into town.  We will revisit this next week.    10/25/2023 : Clinic visit with ADILSON Arthur, HOLDEN, IMAN, LILIYA.   Anjum is in his usual state of health, states he is feeling well overall.  He does have some redness and swelling to his left second toe, toenail is loose.  He states he believes he may have bumped into something, but is not sure when or what.  He does not appear to be a significant wound.  I sent Rx for Augmentin to cover possible infection.  We will monitor this wound each visit.  May need to remove toenail at some point.   His foot wound is again nearly resolved, sacral wounds are about the same.    11/3/2023 : Clinic visit with ADILSON Arthur, HOLDEN, IMAN, LILIYA.   Anjum is feeling well today.  His plantar foot wound is again resolved.  Medial lower leg wound continues to wax and wane with poor tissue quality.  His heel and posterior leg wounds both present with intact skin, though friable.   His left second toenail is loose, first noted last visit.  Toenail removed in clinic today   Both sacral wounds have decreased in area.  Anjum had some concerns about starting VAC.  Given  the current progress of these wounds, I do not feel VAC is necessary.  This can be reconsidered if these wounds stall or deteriorate    11/10/2023 : Clinic visit with ADILSON Arthur, HOLDEN, LILIYA VALERIO.   Anjum continues to feel well.  His plantar foot wound remains resolved, though presents with purple/blue discoloration-bears watching.  Medial leg wound about the same, small open areas with poor tissue quality.  Skin to left heel remains intact but fragile.  Sacral wounds are currently progressing well, though still communicating via tract.  Nailbed of second toe was covered with dry eschar, site of toenail removal last week, appears to be stable.  Small wound to right ischium is slightly worse today.  I checked patient's wheelchair cushion, appears to be in good repair with no deflated areas.    11/17/2023 : Clinic visit with ADILSON Arthur, HOLDEN, LILIYA VALERIO.   Anjum is doing well today.  He states he had a friend come into town this week and spent a lot of time sitting and talking with him.  Surprisingly, his sacral wound did not deteriorate significantly.,  However his right ischial wound has deteriorated, and his posterior leg wound has slightly reopened.      11/22/2023 : Clinic visit with ADILSON Arthur, HOLDEN, IMAN, LILIYA.   Continues to feel well, he is looking forward to spending the Thanksgiving holiday with his family tomorrow.  His wounds continue to wax and wane.  Posterior left lower leg wound is larger today..  Ischial wound and superior sacral wound have also increased in area since last assessment.  He denies sitting any more than usual.  His pressure-relief cushion on his wheelchair is in good repair.    11/29/2023 : Clinic visit with ADILSON Arthur, HOLDEN, IMAN, LILIYA.   Anjum continues to feel well.  His right ischial wound has again deteriorated, area has increased.  Patient denies using slide board transfers.  His wheelchair cushion is in excellent condition.  The eschar to  the nailbed of his left second toe is draining from proximal edge, approximately half of the eschar was trimmed today in clinic revealing moist red wound bed.  Sacral wound presents with recurrence of small satellite wounds distal from the larger wound, however overall area of sacral wound has decreased.    REVIEW OF SYSTEMS:   Unchanged from previous wound clinic assessment on 11/22/2023, except as noted in interval history above     PHYSICAL EXAMINATION:   /58   Pulse (!) 50   Temp 36.7 °C (98.1 °F) (Temporal)   Resp 18   SpO2 94%   Physical Exam  Constitutional:       Appearance: He is obese.   Cardiovascular:      Rate and Rhythm: Normal rate.   Pulmonary:      Effort: Pulmonary effort is normal.   Abdominal:      Comments: Colostomy left lower quadrant   Genitourinary:     Comments: Suprapubic catheter to down drain   Skin:     Comments: Stage IV coccygeal pressure injury -2 wounds, superior and inferior.  Both wounds measure smaller.  There are satellite wounds distal from the larger wound, these wounds have tended to wax and wane, moderate serosanguineous drainage, slough to wound bed, no evidence of infection      Full-thickness wound to left medial lower leg -measures smaller today, poor tissue quality, minimal to moderate serosanguineous drainage, no evidence of infection    Stage III pressure injury left posterior heel -remains resolved, though covered with thin epithelium, high risk for recurrent    Stage IV pressure injury plantar foot -skin remains intact, area of discoloration is decreased, high risk for recurrence    Right ischium pressure injury, shallow stage III-wound area has increased, moderate serosanguineous drainage, slough to wound bed, no evidence of infection    Stage III pressure injury to posterior left lower leg-measures larger today, slough to wound bed, moderate serosanguineous drainage, no evidence of infection    Left second toe nailbed-eschar lifting at approximately  margin, approximately half of the eschar was trimmed in clinic today expose a red wound bed, moderate serosanguineous drainage, no evidence of infection     Neurological:      Mental Status: He is alert and oriented to person, place, and time.   Psychiatric:         Mood and Affect: Mood normal.         WOUND ASSESSMENT  Wound 06/18/23 Pressure Injury Coccyx Stage IV Inferior wound (Active)   Wound Image    11/29/23 1400   Site Assessment Red;Fragile 11/29/23 1400   Periwound Assessment Scar tissue;Maceration 11/29/23 1400   Margins Epibole (rolled edges);Unattached edges 11/29/23 1400   Closure Secondary intention 11/22/23 1600   Drainage Amount Moderate 11/29/23 1400   Drainage Description Serosanguineous 11/29/23 1400   Treatments Cleansed;Provider debridement 11/29/23 1400   Wound Cleansing Hypochlorus Acid 11/29/23 1400   Periwound Protectant Barrier Paste 11/29/23 1400   Dressing Changed Changed 11/29/23 1400   Dressing Cleansing/Solutions Not Applicable 11/29/23 1400   Dressing Options Hydrofiber Silver Strip;Hydrofiber Silver;Offloading Dressing - Sacral 11/29/23 1400   Dressing Change/Treatment Frequency Monday, Wednesday, Friday, and As Needed 11/29/23 1400   Wound Team Following Weekly 11/29/23 1400   WOUND NURSE ONLY - Pressure Injury Stage 4 11/29/23 1400   Wound Length (cm) 5.4 cm 11/29/23 1400   Wound Width (cm) 0.5 cm 11/29/23 1400   Wound Depth (cm) 0.6 cm 11/29/23 1400   Wound Surface Area (cm^2) 2.7 cm^2 11/29/23 1400   Wound Volume (cm^3) 1.62 cm^3 11/29/23 1400   Post-Procedure Length (cm) 5.4 cm 11/29/23 1400   Post-Procedure Width (cm) 0.5 cm 11/29/23 1400   Post-Procedure Depth (cm) 0.8 cm 11/29/23 1400   Post-Procedure Surface Area (cm^2) 2.7 cm^2 11/29/23 1400   Post-Procedure Volume (cm^3) 2.16 cm^3 11/29/23 1400   Wound Healing % 55 11/29/23 1400   Tunneling (cm) 1.2 cm 11/29/23 1400   Tunneling Clock Position of Wound 12 11/29/23 1400   Undermining (cm) 0 cm 11/29/23 1400   Undermining  of Wound, 1st Location From 6 o'clock;To 7 o'clock 11/03/23 1515   Undermining (cm) - 2nd location 3.1 cm 09/18/23 1200   Undermining of Wound, 2nd Location From 11 o'clock;From 1 o'clock 09/18/23 1200   Wound Odor None 11/29/23 1400   Exposed Structures None 11/29/23 1400   Number of days: 164       Wound 06/18/23 Full Thickness Wound Leg Medial Left (Active)   Wound Image   11/29/23 1400   Site Assessment Red;Boggy 11/29/23 1400   Periwound Assessment Denuded;Flaky;Blanchable erythema;Scar tissue 11/29/23 1400   Margins Attached edges 11/29/23 1400   Drainage Amount Moderate 11/29/23 1400   Drainage Description Serosanguineous 11/29/23 1400   Treatments Cleansed;Nonselective debridement 11/29/23 1400   Wound Cleansing Hypochlorus Acid 11/29/23 1400   Periwound Protectant Barrier Paste 11/29/23 1400   Dressing Changed Changed 11/29/23 1400   Dressing Cleansing/Solutions Not Applicable 11/29/23 1400   Dressing Options Hydrofiber Silver;Offloading Dressing - Sacral;Tubigrip 11/29/23 1400   Dressing Change/Treatment Frequency Monday, Wednesday, Friday, and As Needed 11/29/23 1400   Wound Team Following Weekly 11/29/23 1400   Non-staged Wound Description Full thickness 11/29/23 1400   Wound Length (cm) 0.5 cm 11/29/23 1400   Wound Width (cm) 0.4 cm 11/29/23 1400   Wound Depth (cm) 0.3 cm 11/29/23 1400   Wound Surface Area (cm^2) 0.2 cm^2 11/29/23 1400   Wound Volume (cm^3) 0.06 cm^3 11/29/23 1400   Post-Procedure Length (cm) 1 cm 10/20/23 1500   Post-Procedure Width (cm) 2.5 cm 10/20/23 1500   Post-Procedure Depth (cm) 0.3 cm 10/20/23 1500   Post-Procedure Surface Area (cm^2) 2.5 cm^2 10/20/23 1500   Post-Procedure Volume (cm^3) 0.75 cm^3 10/20/23 1500   Wound Healing % 80 11/29/23 1400   Tunneling (cm) 0 cm 11/29/23 1400   Undermining (cm) 0 cm 11/29/23 1400   Wound Odor None 11/29/23 1400   Exposed Structures None 11/29/23 1400   Number of days: 164       Wound 06/18/23 Pressure Injury Leg Posterior Left resolved  9/1/23, re-opened 11/17/23 (Active)   Wound Image     11/29/23 1400   Site Assessment Red 11/29/23 1400   Periwound Assessment Blanchable erythema;Flaky 11/29/23 1400   Margins Attached edges 11/29/23 1400   Closure Secondary intention 11/22/23 1600   Drainage Amount Moderate 11/29/23 1400   Drainage Description Serosanguineous 11/29/23 1400   Treatments Cleansed;Provider debridement 11/29/23 1400   Wound Cleansing Hypochlorus Acid 11/29/23 1400   Periwound Protectant Barrier Paste 11/29/23 1400   Dressing Changed Changed 11/29/23 1400   Dressing Cleansing/Solutions Not Applicable 11/29/23 1400   Dressing Options Hydrofiber Silver;Offloading Dressing - Sacral;Tubigrip 11/29/23 1400   Dressing Change/Treatment Frequency Monday, Wednesday, Friday, and As Needed 11/29/23 1400   Wound Team Following Weekly 11/29/23 1400   WOUND NURSE ONLY - Pressure Injury Stage 3 11/29/23 1400   Non-staged Wound Description Not applicable 11/29/23 1400   Wound Length (cm) 1 cm 11/29/23 1400   Wound Width (cm) 1.2 cm 11/29/23 1400   Wound Depth (cm) 0.1 cm 11/29/23 1400   Wound Surface Area (cm^2) 1.2 cm^2 11/29/23 1400   Wound Volume (cm^3) 0.12 cm^3 11/29/23 1400   Post-Procedure Length (cm) 1.2 cm 11/29/23 1400   Post-Procedure Width (cm) 1.5 cm 11/29/23 1400   Post-Procedure Depth (cm) 0.1 cm 11/29/23 1400   Post-Procedure Surface Area (cm^2) 1.8 cm^2 11/29/23 1400   Post-Procedure Volume (cm^3) 0.18 cm^3 11/29/23 1400   Wound Healing % 88 11/29/23 1400   Tunneling (cm) 0 cm 11/29/23 1400   Undermining (cm) 0 cm 11/29/23 1400   Wound Odor None 11/29/23 1400   Exposed Structures None 11/29/23 1400   Number of days: 164       Wound 08/11/23 Full Thickness Wound Foot Lateral Left (Active)   Wound Image   11/29/23 1400   Site Assessment Purple;Epithelialization;Scabbed 11/29/23 1400   Periwound Assessment Fragile 11/29/23 1400   Margins Attached edges 11/29/23 1400   Drainage Amount None 11/29/23 1400   Drainage Description  Serosanguineous 11/17/23 1500   Treatments Cleansed 11/29/23 1400   Wound Cleansing Hypochlorus Acid 11/29/23 1400   Periwound Protectant Barrier Paste 11/29/23 1400   Dressing Status Clean;Intact 08/11/23 1500   Dressing Changed Changed 11/29/23 1400   Dressing Cleansing/Solutions Not Applicable 11/29/23 1400   Dressing Options Silicone Adhesive Foam;Offloading Dressing - Heel;Tubigrip 11/29/23 1400   Dressing Change/Treatment Frequency Monday, Wednesday, Friday, and As Needed 11/29/23 1400   Wound Team Following Weekly 11/29/23 1400   Non-staged Wound Description Full thickness 11/29/23 1400   Wound Length (cm) 0.2 cm 11/17/23 1500   Wound Width (cm) 0.3 cm 11/17/23 1500   Wound Depth (cm) 0.2 cm 11/17/23 1500   Wound Surface Area (cm^2) 0.06 cm^2 11/17/23 1500   Wound Volume (cm^3) 0.012 cm^3 11/17/23 1500   Post-Procedure Length (cm) 0.5 cm 10/25/23 1500   Post-Procedure Width (cm) 0.6 cm 10/25/23 1500   Post-Procedure Depth (cm) 0.1 cm 10/25/23 1500   Post-Procedure Surface Area (cm^2) 0.3 cm^2 10/25/23 1500   Post-Procedure Volume (cm^3) 0.03 cm^3 10/25/23 1500   Wound Healing % 50 11/17/23 1500   Tunneling (cm) 0 cm 11/17/23 1500   Tunneling Clock Position of Wound 5 10/06/23 1500   Undermining (cm) 0 cm 11/17/23 1500   Undermining of Wound, 1st Location From 5 o'clock;To 7 o'clock 10/25/23 1500   Wound Odor None 11/29/23 1400   Exposed Structures None 11/29/23 1400   Number of days: 110       Wound 09/13/23 Pressure Injury Coccyx Superior (Active)   Wound Image    11/29/23 1400   Site Assessment Red;Pale;Fragile 11/29/23 1400   Periwound Assessment Scar tissue 11/29/23 1400   Margins Unattached edges 11/29/23 1400   Drainage Amount Moderate 11/29/23 1400   Drainage Description Serosanguineous 11/29/23 1400   Treatments Cleansed;Provider debridement 11/29/23 1400   Wound Cleansing Hypochlorus Acid 11/29/23 1400   Periwound Protectant Barrier Paste 11/29/23 1400   Dressing Status Old drainage 11/03/23 7453    Dressing Changed Changed 11/29/23 1400   Dressing Cleansing/Solutions Not Applicable 11/29/23 1400   Dressing Options Hydrofiber Silver;Offloading Dressing - Sacral 11/29/23 1400   Dressing Change/Treatment Frequency Monday, Wednesday, Friday, and As Needed 11/29/23 1400   Wound Team Following Weekly 11/29/23 1400   WOUND NURSE ONLY - Pressure Injury Stage 4 11/29/23 1400   Non-staged Wound Description Not applicable 11/29/23 1400   Wound Length (cm) 4 cm 11/29/23 1400   Wound Width (cm) 0.3 cm 11/29/23 1400   Wound Depth (cm) 0.1 cm 11/29/23 1400   Wound Surface Area (cm^2) 1.2 cm^2 11/29/23 1400   Wound Volume (cm^3) 0.12 cm^3 11/29/23 1400   Post-Procedure Length (cm) 4 cm 11/29/23 1400   Post-Procedure Width (cm) 0.4 cm 11/29/23 1400   Post-Procedure Depth (cm) 0.5 cm 11/29/23 1400   Post-Procedure Surface Area (cm^2) 1.6 cm^2 11/29/23 1400   Post-Procedure Volume (cm^3) 0.8 cm^3 11/29/23 1400   Wound Healing % 78 11/29/23 1400   Tunneling (cm) 0 cm 11/29/23 1400   Tunneling Clock Position of Wound 6 11/10/23 1400   Undermining (cm) 0 cm 11/29/23 1400   Undermining of Wound, 1st Location From 7 o'clock;To 9 o'clock 10/25/23 1500   Wound Odor None 11/29/23 1400   Exposed Structures None 11/29/23 1400   Number of days: 77       Wound 10/25/23 Pressure Injury Right ischial tuberosity (Active)   Wound Image    11/29/23 1400   Site Assessment Yellow;Red;Brown;Dry 11/29/23 1400   Periwound Assessment Blanchable erythema 11/29/23 1400   Margins Attached edges 11/29/23 1400   Closure Secondary intention 11/22/23 1600   Drainage Amount Small 11/29/23 1400   Drainage Description Serosanguineous 11/29/23 1400   Treatments Cleansed;Provider debridement 11/29/23 1400   Wound Cleansing Hypochlorus Acid 11/29/23 1400   Periwound Protectant Barrier Paste 11/29/23 1400   Dressing Status Clean;Dry;Intact 11/17/23 1500   Dressing Changed New 11/29/23 1400   Dressing Cleansing/Solutions Not Applicable 11/29/23 1400   Dressing  Options Honey Colloid;Offloading Dressing - Sacral;Komprex Horseshoe;Hypafix Tape 11/29/23 1400   Dressing Change/Treatment Frequency Monday, Wednesday, Friday, and As Needed 11/29/23 1400   Wound Team Following Weekly 11/29/23 1400   WOUND NURSE ONLY - Pressure Injury Stage 3 11/29/23 1400   Wound Length (cm) 1.7 cm 11/29/23 1400   Wound Width (cm) 1.2 cm 11/29/23 1400   Wound Depth (cm) 0.1 cm 11/29/23 1400   Wound Surface Area (cm^2) 2.04 cm^2 11/29/23 1400   Wound Volume (cm^3) 0.204 cm^3 11/29/23 1400   Post-Procedure Length (cm) 1.9 cm 11/29/23 1400   Post-Procedure Width (cm) 1.4 cm 11/29/23 1400   Post-Procedure Depth (cm) 0.2 cm 11/29/23 1400   Post-Procedure Surface Area (cm^2) 2.66 cm^2 11/29/23 1400   Post-Procedure Volume (cm^3) 0.532 cm^3 11/29/23 1400   Wound Healing % -2450 11/29/23 1400   Tunneling (cm) 0 cm 11/29/23 1400   Undermining (cm) 0 cm 11/29/23 1400   Wound Odor None 11/29/23 1400   Exposed Structures None 11/29/23 1400   Number of days: 35       Wound 10/25/23 Full Thickness Wound Toe, 2nd Dorsal Left Nailbed (Active)   Wound Image   11/29/23 1400   Site Assessment Red;Brown 11/29/23 1400   Periwound Assessment Dry 11/29/23 1400   Margins Attached edges 11/29/23 1400   Drainage Amount Small 11/29/23 1400   Drainage Description Serosanguineous 11/29/23 1400   Treatments Cleansed 11/29/23 1400   Wound Cleansing Hypochlorus Acid 11/29/23 1400   Periwound Protectant Barrier Paste 11/29/23 1400   Dressing Status Open to Air 11/22/23 1600   Dressing Cleansing/Solutions Not Applicable 11/29/23 1400   Dressing Options Hydrofiber Silver;Nonadhesive Foam;Hypafix Tape 11/29/23 1400   Dressing Change/Treatment Frequency Monday, Wednesday, Friday, and As Needed 11/29/23 1400   Wound Team Following Weekly 11/29/23 1400   Non-staged Wound Description Full thickness 11/29/23 1400   Wound Length (cm) 0.1 cm 11/03/23 1515   Wound Width (cm) 0.5 cm 11/03/23 1515   Wound Depth (cm) 0.1 cm 11/03/23 1515    Wound Surface Area (cm^2) 0.05 cm^2 11/03/23 1515   Wound Volume (cm^3) 0.005 cm^3 11/03/23 1515   Post-Procedure Length (cm) 0.2 cm 11/29/23 1400   Post-Procedure Width (cm) 1 cm 11/29/23 1400   Post-Procedure Depth (cm) 0.1 cm 11/29/23 1400   Post-Procedure Surface Area (cm^2) 0.2 cm^2 11/29/23 1400   Post-Procedure Volume (cm^3) 0.02 cm^3 11/29/23 1400   Wound Healing % 17 11/03/23 1515   Tunneling (cm) 0 cm 11/29/23 1400   Undermining (cm) 0 cm 11/29/23 1400   Wound Odor None 11/29/23 1400   Exposed Structures None 11/29/23 1400   Number of days: 35       Wound 11/29/23 Full Thickness Wound Toe, 2nd Dorsal Left (Active)   Wound Image   11/29/23 1400   Site Assessment Red;[place] 11/29/23 1400   Periwound Assessment Dry 11/29/23 1400   Margins Attached edges 11/29/23 1400   Drainage Amount Small 11/29/23 1400   Drainage Description Serosanguineous 11/29/23 1400   Treatments Cleansed;Nonselective debridement 11/29/23 1400   Wound Cleansing Hypochlorus Acid 11/29/23 1400   Periwound Protectant Barrier Paste 11/29/23 1400   Dressing Cleansing/Solutions Not Applicable 11/29/23 1400   Dressing Options Hydrofiber Silver;Nonadhesive Foam;Hypafix Tape 11/29/23 1400   Dressing Change/Treatment Frequency Monday, Wednesday, Friday, and As Needed 11/29/23 1400   Wound Team Following Weekly 11/29/23 1400   Non-staged Wound Description Full thickness 11/29/23 1400   Wound Length (cm) 0.3 cm 11/29/23 1400   Wound Width (cm) 0.3 cm 11/29/23 1400   Wound Depth (cm) 0.1 cm 11/29/23 1400   Wound Surface Area (cm^2) 0.09 cm^2 11/29/23 1400   Wound Volume (cm^3) 0.009 cm^3 11/29/23 1400   Tunneling (cm) 0 cm 11/29/23 1400   Undermining (cm) 0 cm 11/29/23 1400   Wound Odor None 11/29/23 1400   Exposed Structures None 11/29/23 1400   Number of days: 0       PROCEDURE: Excisional debridement of sacral / coccygeal pressure injury ulcers  -2% viscous lidocaine applied topically to wound beds for approximately 5 minutes prior to  debridement  -Curette used to debride wound bed.  Excisional debridement was performed to remove devitalized tissue until healthy, bleeding tissue was visualized.  Total wound area debrided 4.3 cm² including into the wound tunnel.  Tissue debrided into the muscle / fascia layer.    -Bleeding controlled with manual pressure.    -Wound care completed by wound RN, refer to flowsheet  -Patient tolerated the procedure well, without c/o pain or discomfort.      PROCEDURE: Excisional debridement of left posterior leg ulcer, and right ischial ulcer  -2% viscous lidocaine applied topically to wound bed for approximately 5 minutes prior to debridement  -Curette used to debride wound bed.  Excisional debridement was performed to remove devitalized tissue until healthy, bleeding tissue was visualized.   Entire surface of wound, 4.46 cm² debrided.  Tissue debrided into the subcutaneous layer.    -Bleeding controlled with manual pressure.    -Wound care completed by wound RN, refer to flowsheet  -Patient tolerated the procedure well, without c/o pain or discomfort.      PROCEDURE: Excisional debridement of left second toe nailbed  -2% viscous lidocaine applied topically to wound bed for approximately 5 minutes prior to debridement  -Forceps and scissors used to lift and excise approximately half of the eschar from wound bed  -Curette then used to debride wound bed.  Excisional debridement was performed to remove devitalized tissue until healthy, bleeding tissue was visualized.  Total area debrided approximately 0.2 cm².  Tissue debrided into the subcutaneous layer.    -Bleeding controlled with manual pressure.    -Wound care completed by wound RN, refer to flowsheet  -Patient tolerated the procedure well, without c/o pain or discomfort.        No debridement of left second toe wound, right ischium, left heel, left medial lower leg, or left posterior leg wounds required today.    BIOLOGIC LOG  5/20/2022-first application.   "PRODUCT: EpiCord 2 x 3 cm . PRODUCT #[id number]; EXPIRES: 2026-12-01 5/27/2022- second application.  PRODUCT: EpiCordEX 2 x 3 cm . PRODUCT #[id number]; EXPIRES: 2026-09-01    6/3/2022- third application.  PRODUCT: EpiCordEX 2 x 3 cm . PRODUCT #[id number]; EXPIRES: 2026-10-01    6/10/2022- fourth application.  PRODUCT: EpiCordEX 2 x 3 cm . PRODUCT #[id number]; EXPIRES: 2026-09-01 6/17/2022- fifth application.  PRODUCT: EpiCordEX 2 x 3 cm . PRODUCT #[id number]; EXPIRES: 2027-02-01 6/24/2022- sixth application.  PRODUCT: EpiCordEX 2 x 3 cm . PRODUCT #[id number]; EXPIRES: 2027-02-01 07/01/22: Seventh application.  Product: Epicort Dx 2.0 x 3.0 cm reorder number [id number]; product number [id number]; expires 2027-02-01 7/22/2022- eighth application.  PRODUCT: EpiCord 2 x 3 cm, reorder #[id number]. PRODUCT #[id number]; EXPIRES: 2027-02-01      Pertinent Labs and Diagnostics:    Labs:     A1c: No results found for: \"HBA1C\"     [facility] results, 7/1/2022 (under media tab)    CRP 13    ESR 31      IMAGING:     10/3/2022-CT of pelvis with contrast  IMPRESSION:     1.  Posterior soft tissue sacral wound and 1.5 x 3.7 cm abscess.   2.  Progressive destruction of the distal sacrum and proximal coccyx. Sclerosis and irregularity of the distal sacrum consistent with osteomyelitis.   3.  Old wound within the soft tissues posterior to the distal left SI joint with possible fistulous communication.    10/3/2022-CT of tib-fib with and without contrast, with reconstruction  IMPRESSION:     1.  Old fractures of the left tibia and fibula with extensive callus formation and bony bridging as well as extensive dystrophic calcifications. Similar to previous.   2.  Distal medial soft tissue wounds with ill-defined superficial in the soft tissue thickening and fluid collections suggestive of phlegmon. No organized abscess identified.   3.  No definite osteomyelitis.   4.  " Arthritic changes.        VASCULAR STUDIES: No results found.    LAST  WOUND CULTURE:   Lab Results   Component Value Date/Time    CULTRSULT (A) 09/13/2023 04:10 PM     Growth noted after further incubation, see below for  organism identification.  Light growth usual skin ade.      CULTRSULT (A) 09/13/2023 04:10 PM     Proteus mirabilis ESBL  Light growth  Extended Spectrum Beta-lactamase (ESBL) isolated.  ESBL's may be clinically resistant to therapy with  Penicillins,Cephalosporins or Aztreonam despite  apparent in vitro susceptibility to some of these agents.  The patient requires contact isolation.  Please contact pharmacy or an Infectious Disease Specialist  if you have any questions about appropriate therapy.      CULTRSULT Klebsiella pneumoniae  Light growth   (A) 09/13/2023 04:10 PM    CULTRSULT (A) 09/13/2023 04:10 PM     Methicillin Resistant Staphylococcus aureus  Light growth        PATHOLOGY  2/17/2023-bone fragment extracted from left lower extremity wound\\  FINAL DIAGNOSIS:     A. Left leg bone fragment at base of chronic wound:          Extensively degenerated fibrocartilaginous tissue with a rim of           fibrinopurulent debris          Correlate with culture findings            Comment: While no residual intact bone is identified, these           findings are suggestive of adjacent osteomyelitis.      ASSESSMENT AND PLAN:     1. Sacral decubitus ulcer, stage IV ([facility])  Comments: Ulcer first noted in early April 2022 as small open area which quickly enlarged.  This ulcer is present distal from previous sacral ulcer which healed after surgery in January.  Patient has history of flap reconstruction x2 to this area.  CT shows progressive destruction of distal sacrum and proximal coccyx.    11/29/2023: Area of superior and inferior wounds has decreased, however reopening of satellite wounds distal from largest wound.  Satellite wounds have waxed and waned over the course of treatment  - Excisional  debridement was performed in clinic, medically necessary to promote wound healing.   -Patient to return to clinic weekly for assessment and debridement  -Home health to change dressing 2 times per week   -Patient does spend a lot of time up in his wheelchair, and wishes to continue to do so for his quality of life.  He lives independently, drives, and is involved with family activities.  He does have an appropriate pressure-relief cushion and is very well versed on pressure relieving techniques.  - Patient does not currently have follow up with Dr. Saini. If wound deteriorates or fails to improve can consider re-establishing with Dr. Saini for evaluation for coverage options. Patient does not want to pursue at this time and is happy with current conservative approach even with wound worsening.  - Known OM that has already been treated. No utility of Abx unless gross soft tissue infection which does not appear to be present today.    Wound care: Collagen, Hydrofiber silver strip, hydrofiber silver, Mepilex    2. Postoperative wound dehiscence, subsequent encounter  3. Osteomyelitis of left lower extremity  Comments: On 4/26/2022 patient underwent irrigation and debridement of multiple compartments of the left lower extremity states for pressure injury, with bone excision, and complex closure of chronic wound using biologic skin substitute.  During postop visit in surgeons office on 5/11, surgical site was noted to be dehisced.  Patient was referred to wound clinic for management.    11/29/2023: Wound has decreased  -No excisional debridement required today  -Patient to return to clinic weekly for assessment and debridement as needed  -Home health to change dressing 1-2 times per week in between clinic visits OM.  -Suspect underlying OM, however patient does not wish to consider surgical options at this time nor does his surgeon wish to do any further surgery to this leg. Patient was treated with Abx for 6 weeks for  OM of this bone in 2022. There is no gross soft tissue infection and repeated Abx treatment without source control is not indicated.    -We will assess these wounds each clinic visit  -Patient to be mindful of offloading when supine, should assure that his leg is not rotating medially  Wound care: Hydrofiber silver, silicone adhesive foam, tubigrip    4. Deep tissue injury  5. Pressure injury of left foot, stage IV  Comments: DTI to posterior left lower leg first observed in clinic 7/29/2022.  Patient does not know how it started, had been wearing heel float boot consistently.  Evolved into stage IV pressure injury    11/29/2023: Skin over plantar foot wound remains intact, area of discoloration has decreased.  Posterior leg wound area has decreased.   -Excisional debridement of posterior leg wound in clinic today, medically necessary to promote wound healing.  -Patient to return to clinic weekly for assessment and debridement  -Home health to change dressing 1-2 times per week in between clinic visits        Wound care-posterior leg wound: Silver Hydrofiber to manage exudate and bioburden, sacral foam cover dressing,    6. Pressure injury of left heel, stage 3 ([facility])  Comments: First noted in clinic on 10/21/2022, originally noted to be stage II    11/29/2023: Skin remains intact.  -We will continue to monitor wound each visit, high risk for recurrence  - Patient continue wearing heel float boots at all times  - Heel does not appear to contact any solid surfaces while in wheelchair   Wound Care: Heel Mepilex to reduce pressure and friction    7.  Pressure injury of right buttock/ischium, stage III    11/29/2023: Wound area has increased since last assessment, slough to wound bed.  -Excisional debridement of wound in clinic today, medically necessary to promote wound healing.  -Patient to return to clinic weekly for assessment and debridement  -Patient to change dressing 1-2 times per week in between clinic visits      Wound care: Silver Hydrofiber to manage exudate and bioburden, silicone foam cover dressing    8. Quadriplegia, C5-C7, incomplete (HCC)  Comments: Complicating factor.  Impaired mobility and sensation  -Patient is still spending 7-8 hours/day up in his wheelchair, though he does have appropriate offloading cushion, and knows to reposition frequently.  Wears heel float boots bilaterally at all times    9.  Injury of toenail of left foot, subsequent encounter  10.  Infected wound    11/29/2023: Proximal edge of eschar noted to be lifting, serous drainage  -Approximately half of the eschar was trimmed in clinic today, exposing red wound bed  -Excisional debridement of wound in clinic today, medically necessary to promote wound healing  -Discontinue Betadine    Wound care: Silver Hydrofiber to manage exudate and bioburden, foam cover dressing, Hypafix tape       Please note that this note may have been created using voice recognition software. I have worked with technical experts from [employer] [facility] to optimize the interface.  I have made every reasonable attempt to correct obvious errors, but there may be errors of grammar and possibly content that I did not discover before finalizing the note.

## 2023-11-30 NOTE — PROGRESS NOTES
Home wound care order routed to [facility] via [facility].

## 2023-11-30 NOTE — PATIENT INSTRUCTIONS
-Keep your wound dressing clean, dry, and intact.    -Change your dressing if it becomes soiled, soaked, or falls off.      -Should you experience any significant changes in your wound(s), such as infection (redness, swelling, localized heat, increased pain, fever > 101 F, chills) or have any questions regarding your home care instructions, please contact the wound center at [phone]. If after hours, contact your primary care physician or go to the hospital emergency room.

## 2023-12-06 ENCOUNTER — OFFICE VISIT (OUTPATIENT)
Dept: WOUND CARE | Facility: MEDICAL CENTER | Age: 73
End: 2023-12-06
Attending: INTERNAL MEDICINE
Payer: MEDICARE

## 2023-12-06 ENCOUNTER — TELEPHONE (OUTPATIENT)
Dept: CARDIOLOGY | Facility: MEDICAL CENTER | Age: 73
End: 2023-12-06

## 2023-12-06 VITALS
TEMPERATURE: 97.6 F | DIASTOLIC BLOOD PRESSURE: 71 MMHG | OXYGEN SATURATION: 97 % | SYSTOLIC BLOOD PRESSURE: 115 MMHG | HEART RATE: 53 BPM | RESPIRATION RATE: 18 BRPM

## 2023-12-06 DIAGNOSIS — L89.313 PRESSURE INJURY OF RIGHT ISCHIUM, STAGE 3 (HCC): ICD-10-CM

## 2023-12-06 DIAGNOSIS — T14.8XXA DEEP TISSUE INJURY: ICD-10-CM

## 2023-12-06 DIAGNOSIS — S91.109D OPEN WOUND OF TOE, SUBSEQUENT ENCOUNTER: ICD-10-CM

## 2023-12-06 DIAGNOSIS — L89.890 PRESSURE INJURY OF LEFT LEG, UNSTAGEABLE (HCC): ICD-10-CM

## 2023-12-06 DIAGNOSIS — T14.8XXA INFECTED WOUND: ICD-10-CM

## 2023-12-06 DIAGNOSIS — L89.154 SACRAL DECUBITUS ULCER, STAGE IV (HCC): ICD-10-CM

## 2023-12-06 DIAGNOSIS — M86.9 OSTEOMYELITIS OF LEFT LOWER EXTREMITY (HCC): ICD-10-CM

## 2023-12-06 DIAGNOSIS — T81.31XD POSTOPERATIVE WOUND DEHISCENCE, SUBSEQUENT ENCOUNTER: ICD-10-CM

## 2023-12-06 DIAGNOSIS — L89.894 PRESSURE INJURY OF LEFT FOOT, STAGE 4 (HCC): ICD-10-CM

## 2023-12-06 DIAGNOSIS — L08.9 INFECTED WOUND: ICD-10-CM

## 2023-12-06 DIAGNOSIS — G82.53 QUADRIPLEGIA, C5-C7, COMPLETE (HCC): ICD-10-CM

## 2023-12-06 DIAGNOSIS — S99.922D INJURY OF TOENAIL OF LEFT FOOT, SUBSEQUENT ENCOUNTER: ICD-10-CM

## 2023-12-06 PROCEDURE — 3074F SYST BP LT 130 MM HG: CPT | Performed by: NURSE PRACTITIONER

## 2023-12-06 PROCEDURE — 99213 OFFICE O/P EST LOW 20 MIN: CPT | Mod: 25

## 2023-12-06 PROCEDURE — 11042 DBRDMT SUBQ TIS 1ST 20SQCM/<: CPT

## 2023-12-06 PROCEDURE — 99213 OFFICE O/P EST LOW 20 MIN: CPT | Mod: 25 | Performed by: NURSE PRACTITIONER

## 2023-12-06 PROCEDURE — 11042 DBRDMT SUBQ TIS 1ST 20SQCM/<: CPT | Mod: 59 | Performed by: NURSE PRACTITIONER

## 2023-12-06 PROCEDURE — 11043 DBRDMT MUSC&/FSCA 1ST 20/<: CPT

## 2023-12-06 PROCEDURE — 11043 DBRDMT MUSC&/FSCA 1ST 20/<: CPT | Performed by: NURSE PRACTITIONER

## 2023-12-06 PROCEDURE — 3078F DIAST BP <80 MM HG: CPT | Performed by: NURSE PRACTITIONER

## 2023-12-06 RX ORDER — AMOXICILLIN AND CLAVULANATE POTASSIUM 875; 125 MG/1; MG/1
1 TABLET, FILM COATED ORAL 2 TIMES DAILY
Qty: 20 TABLET | Refills: 0 | Status: ON HOLD | OUTPATIENT
Start: 2023-12-06 | End: 2023-12-20

## 2023-12-06 NOTE — TELEPHONE ENCOUNTER
FYI - remote transmission received via home monitor, full report scanned into Media.    Pause Episode  -Episode Onset: 10/29/2023 @ 7:40 PM  -Duration: 4 seconds  -Average Rate: 37 bpm    Pause Episode  -Episode Onset: 10/31/2023 @ 8:30 PM  -Duration: 3 seconds  -Average Rate: 53 bpm    Pause Episode  -Episode Onset: 11/1/2023 @ 9:11 PM  -Duration: 3 seconds  -Average Rate: 36 bpm

## 2023-12-06 NOTE — PROGRESS NOTES
Provider Encounter- Pressure Injury        HISTORY OF PRESENT ILLNESS  Wound History:    START OF CARE IN CLINIC: 6/23/2023 (return to clinic after hospitalization)    REFERRING PROVIDER: Sahara Mendez      WOUNDS- Pressure Injury, Sacrococcygeal, stage IV                                                             Surgical wound dehiscence, left medial lower leg-status post surgical I&D of stage IV pressure injury and           biologic application                    Pressure injury, DTI, left posterior lower leg-first observed in clinic 7/29/2022       Pressure injury stage III L heel - first noted 10/21-resolved     Left 2nd toenail avulsion (previously erroneously documented as a right)- first noted 10/21-resolved     Pressure injury to right inferior buttock/ischium-first observed in clinic 10/25/2023                                       Left dorsal second toe over PIP, full-thickness wound-first observed in clinic 11/29/2023                Full-thickness wound to left dorsal second toe at base of nail bed-first observed in clinic 12/6/2023         HISTORY: Patient with history of incomplete quadriplegia referred to [facility] for treatment of a stage IV pressure injury.  He has a history of previous pressure injuries to this area, and underwent muscle flaps in 2019, and then again in 2020.  He was seen in the wound clinic in November 2021 for an ulcer proximal from his current ulcer, and pressure injuries to his left posterior lower leg and left heel.  At that time, it was discovered that the patient had retained VAC foam embedded in the wound bed of the sacral wound.  Attempts were made to get him back to his plastic surgeon, though unsuccessful.  In January he underwent surgical removal of VAC sponge along with excisional debridement of his sacral wound by Dr. Chaves.  After the surgery, his wound went on to heal without incident.   In early April 2022, his home health nurse noted a new sacral ulcer, below the  previous ulcer which quickly tripled in size over the following weeks.  The ulcer to his left medial lower leg had also deteriorated, with bone visible at the base..  He was hospitalized from 4/22 until 4/27/2022 and underwent surgery with Dr. Raman on 4/26 for irrigation and debridement of multiple compartments of the left lower extremity, bone excision, and complex closure of chronic wound using biologic skin substitute.   His sacrococcygeal wound was not surgically addressed during this admission.  He was discharged back to his group home, with home health, and referral to outpatient wound clinic for his sacral wound.  He was instructed to follow-up with his surgeon for his lower leg wound.       Postoperatively, the left medial lower leg incision dehisced.  He was seen by his surgeon at [facility] on 5/11.  The surgeon opted to leave remaining sutures in place, and refer him to the wound clinic for treatment of this wound.   Treatment of this wound was initiated in clinic on 5/12.  During this visit was also noted that his heel DTI had resolved, but that he had a new pressure injury to his left posterior lower extremity.     A new pressure injury was noted to patient's right upper buttock/lower back on 5/20/2022.  Wound was linear in shape, skin discolored but intact.     Abrasion noted to left anterior lower leg.  First observed in clinic on 7/22/2022.  Patient states he bumped his leg into his food tray.     Small DTI noted to patient's left lateral lower leg on 7/29/2022.  Skin intact but discolored.     Large area of deep tissue injury noted to patient's left exterior lower leg.  Patient denied any trauma to this area.  Skin intact.  Wound documented.    1/27/2023: Patient was admitted to [facility] from 1/23-1/25/2023 with gross hematuria. He underwent RICHARD which showed watchman device was in place and he was taken off of Xarelto. While hospitalized wound team was consulted. He was referred back to [facility] and home health  upon discharge.    Patient was hospitalized at [facility] for pyelonephritis from 2/26 until 3/2/2023, admitted for fever and general malaise.  He was admitted and initially started on linezolid and meropenem for suspected UTI and history of multidrug-resistant organisms.  Urine cultures were negative. ID was consulted, recommended CT of chest and abdomen,which were negative for acute findings. However, he was treated with 5 days total course of antibiotics for suspected UTI, and symptoms completely resolved.  During this admission, the inpatient wound team was consulted for treatment of his sacral and lower leg wounds.  A wound culture was taken from his left heel pressure ulcer, negative.  Once stabilized, he was discharged home and referred back to [facility] to resume treatment of his wounds.    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  Never used smokeless tobacco    Patient's problem list, allergies, and current medications reviewed and updated in Epic    Interval History:  Interval History thinned 7/29/2022.  Please see previous notes for complete interval history.     Interval History thinned 1/27/2023. Please see previous note for complete interval history.    Interval History thinned 3/3/2023.  Please see previous notes for complete interval history.      Interval History thinned 8/4/2023.  Please see previous notes for complete interval history.      7/28/2023: Clinic visit with Steve Hodges MD. Patient reports being in normal state of health. Denies any current issues. His lower wounds are largely unchanged. Sacrum continues to enlarge, he reports that he has been up in chair more  frequently this week. Patient counseled on risks of enlarging pressure injury including risk that wound may progress, known OM may worsen or expand including causing illness. He is aware of risks and possible other treatment options, he would like to proceed with current treatment.    8/4/2023 : Clinic visit with ADILSON Arthur, HOLDEN, LILIYA VALERIO.  Anjum states he is feeling well overall.  Left medial lower leg wound has decreased in area significantly.  However, a new area has opened just below sacral ulcer,  from an ulcer by thin skin bridge.  Patient denies any changes to his usual activity.  His left heel wound remains healed, however he does have some slightly denuded skin to the heel.  He was seen by his podiatrist earlier this week, dressing was applied, possibly causing slight maceration.    8/11/2023 : Clinic visit with ADILSON Arthur, HOLDEN, IMAN, LILIYA.   Anjum continues to feel well.  Unfortunately, left plantar foot lateral foot wound has reopened slightly.  The skin to his heel is slightly macerated, home health is using smaller heel foam dressing .  Sacrum measures about the same.  There is some friable red tissue along superior wound edge that was treated with AgNO3 today.    8/18/2023 : Clinic visit with ADILSON Arthur, HOLDEN, IMAN, LILIYA.   Anjum states he is feeling well.  The plantar foot wound has deteriorated somewhat, area is increased.  Sacral wound is about the same.  Left lower leg wounds are both smaller, though with friable tissue.   Anjum's wounds have been essentially stable for a long time.  We discussed decreasing frequency of clinic visits to every 2 weeks.  Patient is agreeable to this plan.  He understands that we can resume weekly appointments if his wounds deteriorate.    9/1/2023 : Clinic visit with ADILSON Arthur FNP-BC, IMAN, LILIYA.   Anjum states he is in his usual state of health.  Unfortunately, his left heel wound has reopened, and his plantar foot  wound has deteriorated.  The medial leg wound has again nearly closed, though tissue is boggy and will likely reopen.  His sacral wound continues to progress, though very slowly.    9/13/2023: Clinic visit with Steve Hodges MD. Patient reports feeling well. Left plantar lateral foot wound is larger with increased drainage, wound tract that probes towards dorsal foot with some erythema and edema. Concerning for infection. No fluctuance. Patient has been on Augmentin, culture was cancelled previously as he was taking Abx before culture could be obtained. Due to worsening wound today, will add doxycycline to cover MRSA and obtain culture today, though may be sterile as has been on Abx. Sacral pressure injury with breakdown of previous wound site and larger. Patient is not overly concerned and does not want any surgical intervention.    9/18/2023: Clinic visit with Steve Hodges MD. Patient reports feeling in normal state of health. He denies any signs or symptoms of infection currently. Patient left foot wound appears improved. Patient remains on Augmentin. Wound culture was concerning for ESBL Proteus, will discuss case with ID as there are no simple oral options for coverage    9/29/2023 : Clinic visit with ADILSON Arthur, DAT-BC, CWOCN, CFCN.   Anjum states he is feeling okay.  His plantar foot wound is again progressing, area has decreased.  The leg wound is nearly resolved, though poor tissue quality as previously noted.  He is left heel wound and left posterior leg wounds are also again resolved.  Unfortunately, his sacrococcygeal wound is larger, now communicates with superior wound.    10/6/2023 : Clinic visit with ADILSON Arthur, DAT-BC, CWOCN, CFCN.   Anjum continues to feel well.  His left lower extremity wounds  are unchanged from last visit, left foot wound continues to progress.  Superior sacral wound measures slightly larger, inferior wound slightly smaller.  Patient is still not interested in  VAC to heal these wounds.      10/13/2023 : Clinic visit with ADILSON Arthur, HOLDEN, IMAN, LILIYA.   Anjum states he is feeling well.  His lower extremity wounds and foot wound are currently progressing well.  His sacral/coccyx wounds are not progressing.  Discussed restarting VAC.  However, would like to excise skin bridge between the 2 wounds prior.  Patient is agreeable to this idea, however states he has a friend coming in from out of town who will be here for approximately 10 days.  Would like to postpone procedure and restarting of VAC for approximately 2 weeks.    10/20/2023 : Clinic visit with ADLISON Arthur, HOLDEN, IMAN, LILIYA.   Continues to feel well.  His plantar foot wound continues to progress.  Lateral leg wound is a bit larger today, though has tended to wax and wane.  His superior coccyx ulcer actually measures a bit smaller, and depth has decreased, however tissue somewhat boggy.  Inferior coccyx wound measures bit larger.  He still going to consider VAC to his coccyx wound, however wants to wait a few weeks as he has a friend coming into town.  We will revisit this next week.    10/25/2023 : Clinic visit with ADILSON Arthur, HOLDEN, IMAN, LILIYA.   Anjum is in his usual state of health, states he is feeling well overall.  He does have some redness and swelling to his left second toe, toenail is loose.  He states he believes he may have bumped into something, but is not sure when or what.  He does not appear to be a significant wound.  I sent Rx for Augmentin to cover possible infection.  We will monitor this wound each visit.  May need to remove toenail at some point.   His foot wound is again nearly resolved, sacral wounds are about the same.    11/3/2023 : Clinic visit with ADILSON Arthur, HOLDEN, IMAN, LILIYA.   Anjum is feeling well today.  His plantar foot wound is again resolved.  Medial lower leg wound continues to wax and wane with poor tissue quality.  His heel and posterior  leg wounds both present with intact skin, though friable.   His left second toenail is loose, first noted last visit.  Toenail removed in clinic today   Both sacral wounds have decreased in area.  Anjum had some concerns about starting VAC.  Given the current progress of these wounds, I do not feel VAC is necessary.  This can be reconsidered if these wounds stall or deteriorate    11/10/2023 : Clinic visit with ADILSON Arthur, HOLDEN, IMAN, LILIYA.   Anjum continues to feel well.  His plantar foot wound remains resolved, though presents with purple/blue discoloration-bears watching.  Medial leg wound about the same, small open areas with poor tissue quality.  Skin to left heel remains intact but fragile.  Sacral wounds are currently progressing well, though still communicating via tract.  Nailbed of second toe was covered with dry eschar, site of toenail removal last week, appears to be stable.  Small wound to right ischium is slightly worse today.  I checked patient's wheelchair cushion, appears to be in good repair with no deflated areas.    11/17/2023 : Clinic visit with ADILSON Arthur, HOLDEN, IMAN, LILIYA.   nAjum is doing well today.  He states he had a friend come into town this week and spent a lot of time sitting and talking with him.  Surprisingly, his sacral wound did not deteriorate significantly.,  However his right ischial wound has deteriorated, and his posterior leg wound has slightly reopened.      11/22/2023 : Clinic visit with ADILSON Arthur, HOLDEN, IMAN, LILIYA.   Continues to feel well, he is looking forward to spending the Thanksgiving holiday with his family tomorrow.  His wounds continue to wax and wane.  Posterior left lower leg wound is larger today..  Ischial wound and superior sacral wound have also increased in area since last assessment.  He denies sitting any more than usual.  His pressure-relief cushion on his wheelchair is in good repair.    11/29/2023 : Clinic visit with Kelly  ADILSON Sandy, HOLDEN, IMAN, LILIYA.   Anjum continues to feel well.  His right ischial wound has again deteriorated, area has increased.  Patient denies using slide board transfers.  His wheelchair cushion is in excellent condition.  The eschar to the nailbed of his left second toe is draining from proximal edge, approximately half of the eschar was trimmed today in clinic revealing moist red wound bed.  Sacral wound presents with recurrence of small satellite wounds distal from the larger wound, however overall area of sacral wound has decreased.    12/6/2023 : Clinic visit with ADILSON Arthur, HOLDEN, IMAN, LILIYA.   Anjum states he is feeling well today, offers no complaints.  Unfortunately, his right ischial wound continues to deteriorate.  As yet, I have not been able to identify what is causing this deterioration.  His wheelchair cushion is in excellent condition, he is using Yoko for transfers in and out of bed, no slide board, no problems with incontinence due to diversions of stool and urine.  He is well-nourished.  He does spend quite a bit of time in his wheelchair, and does not willing to change this behavior.   He also presents today with deterioration of his left second toe.  Is able to lift the remaining eschar off of the nailbed, revealing intact skin.  However, there is a new full-thickness wound at base of nail bed, and a small wound over PIP joint.  The toes also erythemic and edematous.  Rx for Augmentin sent to patient's pharmacy.  I also ordered an x-ray of his toes.    REVIEW OF SYSTEMS:   Unchanged from previous wound clinic assessment on 11/29/2023, except as noted in interval history above     PHYSICAL EXAMINATION:   /71   Pulse (!) 53   Temp 36.4 °C (97.6 °F)   Resp 18   SpO2 97%   Physical Exam  Constitutional:       Appearance: He is obese.   Cardiovascular:      Rate and Rhythm: Normal rate.   Pulmonary:      Effort: Pulmonary effort is normal.   Abdominal:      Comments:  Colostomy left lower quadrant   Genitourinary:     Comments: Suprapubic catheter to down drain   Skin:     Comments: Stage IV coccygeal pressure injury -2 wounds, superior and inferior.  Area of both wounds continues to decrease, they no longer communicate.  There is 1 small satellite wound distal from the larger wound, last week there were 2.  These wounds tend to wax and wane.  Slough to wound beds, moderate serosanguineous drainage, no evidence of infection    Full-thickness wound to left medial lower leg -wound area continues to wax and wane, poor tissue quality minimal to moderate serosanguineous drainage, no evidence of infection    Stage III pressure injury left posterior heel -remains healed, though covered with fragile epithelium and at high risk for recurrence.        Stage IV pressure injury plantar foot -skin remains intact, discoloration fading, high risk for recurrence    Right ischium pressure injury, shallow stage III-wound area and depth have again increased, adherent slough to wound bed, moderate serosanguineous drainage, no evidence of infection    Stage III pressure injury to posterior left lower leg-measures smaller today, nearly resolved, no need for debridement today.  No evidence of infection    Left second toe nailbed-residual eschar removed, underlying tissue intact.  However new wound at base of nail bed, probes close to bone, moderate serosanguineous drainage.  Toe is erythemic and edematous    Left dorsal second toe over PIP joint-small full-thickness wound, slough to wound bed, moderate serosanguineous drainage.  Entire toe is erythemic and edematous     Neurological:      Mental Status: He is alert and oriented to person, place, and time.   Psychiatric:         Mood and Affect: Mood normal.         WOUND ASSESSMENT  Wound 06/18/23 Pressure Injury Coccyx Stage IV Inferior wound (Active)   Wound Image    12/06/23 1600   Site Assessment Red;Fragile 11/29/23 1400   Periwound Assessment  Scar tissue;Maceration 11/29/23 1400   Margins Epibole (rolled edges);Unattached edges 11/29/23 1400   Closure Secondary intention 11/22/23 1600   Drainage Amount Moderate 11/29/23 1400   Drainage Description Serosanguineous 11/29/23 1400   Treatments Cleansed;Provider debridement 11/29/23 1400   Wound Cleansing Hypochlorus Acid 11/29/23 1400   Periwound Protectant Barrier Paste 11/29/23 1400   Dressing Changed Changed 11/29/23 1400   Dressing Cleansing/Solutions Not Applicable 11/29/23 1400   Dressing Options Hydrofiber Silver Strip;Hydrofiber Silver;Offloading Dressing - Sacral 11/29/23 1400   Dressing Change/Treatment Frequency Monday, Wednesday, Friday, and As Needed 11/29/23 1400   Wound Team Following Weekly 11/29/23 1400   WOUND NURSE ONLY - Pressure Injury Stage 4 11/29/23 1400   Wound Length (cm) 4.3 cm 12/06/23 1600   Wound Width (cm) 0.5 cm 12/06/23 1600   Wound Depth (cm) 0.5 cm 12/06/23 1600   Wound Surface Area (cm^2) 2.15 cm^2 12/06/23 1600   Wound Volume (cm^3) 1.075 cm^3 12/06/23 1600   Post-Procedure Length (cm) 4.3 cm 12/06/23 1600   Post-Procedure Width (cm) 0.6 cm 12/06/23 1600   Post-Procedure Depth (cm) 0.5 cm 12/06/23 1600   Post-Procedure Surface Area (cm^2) 2.58 cm^2 12/06/23 1600   Post-Procedure Volume (cm^3) 1.29 cm^3 12/06/23 1600   Wound Healing % 70 12/06/23 1600   Tunneling (cm) 1.9 cm 12/06/23 1600   Tunneling Clock Position of Wound 12 12/06/23 1600   Undermining (cm) 0 cm 12/06/23 1600   Undermining of Wound, 1st Location From 6 o'clock;To 7 o'clock 11/03/23 1515   Undermining (cm) - 2nd location 3.1 cm 09/18/23 1200   Undermining of Wound, 2nd Location From 11 o'clock;From 1 o'clock 09/18/23 1200   Wound Odor None 12/06/23 1600   Exposed Structures None 12/06/23 1600   Number of days: 171       Wound 06/18/23 Full Thickness Wound Leg Medial Left (Active)   Wound Image   12/06/23 1600   Site Assessment Red;Boggy 11/29/23 1400   Periwound Assessment Denuded;Flaky;Blanchable  erythema;Scar tissue 11/29/23 1400   Margins Attached edges 11/29/23 1400   Drainage Amount Moderate 11/29/23 1400   Drainage Description Serosanguineous 11/29/23 1400   Treatments Cleansed;Nonselective debridement 11/29/23 1400   Wound Cleansing Hypochlorus Acid 11/29/23 1400   Periwound Protectant Barrier Paste 11/29/23 1400   Dressing Changed Changed 11/29/23 1400   Dressing Cleansing/Solutions Not Applicable 11/29/23 1400   Dressing Options Hydrofiber Silver;Offloading Dressing - Sacral;Tubigrip 11/29/23 1400   Dressing Change/Treatment Frequency Monday, Wednesday, Friday, and As Needed 11/29/23 1400   Wound Team Following Weekly 11/29/23 1400   Non-staged Wound Description Full thickness 11/29/23 1400   Wound Length (cm) 0.4 cm 12/06/23 1600   Wound Width (cm) 0.4 cm 12/06/23 1600   Wound Depth (cm) 0.3 cm 12/06/23 1600   Wound Surface Area (cm^2) 0.16 cm^2 12/06/23 1600   Wound Volume (cm^3) 0.048 cm^3 12/06/23 1600   Post-Procedure Length (cm) 1 cm 10/20/23 1500   Post-Procedure Width (cm) 2.5 cm 10/20/23 1500   Post-Procedure Depth (cm) 0.3 cm 10/20/23 1500   Post-Procedure Surface Area (cm^2) 2.5 cm^2 10/20/23 1500   Post-Procedure Volume (cm^3) 0.75 cm^3 10/20/23 1500   Wound Healing % 84 12/06/23 1600   Tunneling (cm) 0 cm 12/06/23 1600   Undermining (cm) 0 cm 12/06/23 1600   Wound Odor None 12/06/23 1600   Exposed Structures None 12/06/23 1600   Number of days: 171       Wound 06/18/23 Pressure Injury Leg Posterior Left resolved 9/1/23, re-opened 11/17/23 (Active)   Wound Image   12/06/23 1600   Site Assessment Red 11/29/23 1400   Periwound Assessment Blanchable erythema;Flaky 11/29/23 1400   Margins Attached edges 11/29/23 1400   Closure Secondary intention 11/22/23 1600   Drainage Amount Moderate 11/29/23 1400   Drainage Description Serosanguineous 11/29/23 1400   Treatments Cleansed;Provider debridement 11/29/23 1400   Wound Cleansing Hypochlorus Acid 11/29/23 1400   Periwound Protectant Barrier Paste  11/29/23 1400   Dressing Changed Changed 11/29/23 1400   Dressing Cleansing/Solutions Not Applicable 11/29/23 1400   Dressing Options Hydrofiber Silver;Offloading Dressing - Sacral;Tubigrip 11/29/23 1400   Dressing Change/Treatment Frequency Monday, Wednesday, Friday, and As Needed 11/29/23 1400   Wound Team Following Weekly 11/29/23 1400   WOUND NURSE ONLY - Pressure Injury Stage 3 11/29/23 1400   Non-staged Wound Description Not applicable 11/29/23 1400   Wound Length (cm) 0.7 cm 12/06/23 1600   Wound Width (cm) 1 cm 12/06/23 1600   Wound Depth (cm) 0.1 cm 12/06/23 1600   Wound Surface Area (cm^2) 0.7 cm^2 12/06/23 1600   Wound Volume (cm^3) 0.07 cm^3 12/06/23 1600   Post-Procedure Length (cm) 1.2 cm 11/29/23 1400   Post-Procedure Width (cm) 1.5 cm 11/29/23 1400   Post-Procedure Depth (cm) 0.1 cm 11/29/23 1400   Post-Procedure Surface Area (cm^2) 1.8 cm^2 11/29/23 1400   Post-Procedure Volume (cm^3) 0.18 cm^3 11/29/23 1400   Wound Healing % 93 12/06/23 1600   Tunneling (cm) 0 cm 12/06/23 1600   Undermining (cm) 0 cm 12/06/23 1600   Wound Odor None 12/06/23 1600   Exposed Structures None 12/06/23 1600   Number of days: 171       Wound 08/11/23 Full Thickness Wound Foot Lateral Left (Active)   Wound Image   12/06/23 1600   Site Assessment Purple;Epithelialization;Scabbed 11/29/23 1400   Periwound Assessment Fragile 11/29/23 1400   Margins Attached edges 11/29/23 1400   Drainage Amount None 11/29/23 1400   Drainage Description Serosanguineous 11/17/23 1500   Treatments Cleansed 11/29/23 1400   Wound Cleansing Hypochlorus Acid 11/29/23 1400   Periwound Protectant Barrier Paste 11/29/23 1400   Dressing Status Clean;Intact 08/11/23 1500   Dressing Changed Changed 11/29/23 1400   Dressing Cleansing/Solutions Not Applicable 11/29/23 1400   Dressing Options Silicone Adhesive Foam;Offloading Dressing - Heel;Tubigrip 11/29/23 1400   Dressing Change/Treatment Frequency Monday, Wednesday, Friday, and As Needed 11/29/23 1400    Wound Team Following Weekly 11/29/23 1400   Non-staged Wound Description Full thickness 11/29/23 1400   Wound Length (cm) 0.2 cm 11/17/23 1500   Wound Width (cm) 0.3 cm 11/17/23 1500   Wound Depth (cm) 0.2 cm 11/17/23 1500   Wound Surface Area (cm^2) 0.06 cm^2 11/17/23 1500   Wound Volume (cm^3) 0.012 cm^3 11/17/23 1500   Post-Procedure Length (cm) 0.5 cm 10/25/23 1500   Post-Procedure Width (cm) 0.6 cm 10/25/23 1500   Post-Procedure Depth (cm) 0.1 cm 10/25/23 1500   Post-Procedure Surface Area (cm^2) 0.3 cm^2 10/25/23 1500   Post-Procedure Volume (cm^3) 0.03 cm^3 10/25/23 1500   Wound Healing % 50 11/17/23 1500   Tunneling (cm) 0 cm 12/06/23 1600   Tunneling Clock Position of Wound 5 10/06/23 1500   Undermining (cm) 0 cm 12/06/23 1600   Undermining of Wound, 1st Location From 5 o'clock;To 7 o'clock 10/25/23 1500   Wound Odor None 12/06/23 1600   Exposed Structures None 12/06/23 1600   Number of days: 117       Wound 09/13/23 Pressure Injury Coccyx Superior (Active)   Wound Image    12/06/23 1600   Site Assessment Red;Pale;Fragile 11/29/23 1400   Periwound Assessment Scar tissue 11/29/23 1400   Margins Unattached edges 11/29/23 1400   Drainage Amount Moderate 11/29/23 1400   Drainage Description Serosanguineous 11/29/23 1400   Treatments Cleansed;Provider debridement 11/29/23 1400   Wound Cleansing Hypochlorus Acid 11/29/23 1400   Periwound Protectant Barrier Paste 11/29/23 1400   Dressing Status Old drainage 11/03/23 1515   Dressing Changed Changed 11/29/23 1400   Dressing Cleansing/Solutions Not Applicable 11/29/23 1400   Dressing Options Hydrofiber Silver;Offloading Dressing - Sacral 11/29/23 1400   Dressing Change/Treatment Frequency Monday, Wednesday, Friday, and As Needed 11/29/23 1400   Wound Team Following Weekly 12/06/23 1600   WOUND NURSE ONLY - Pressure Injury Stage 4 12/06/23 1600   Non-staged Wound Description Not applicable 12/06/23 1600   Wound Length (cm) 2.5 cm 12/06/23 1600   Wound Width (cm) 0.3  cm 12/06/23 1600   Wound Depth (cm) 0.2 cm 12/06/23 1600   Wound Surface Area (cm^2) 0.75 cm^2 12/06/23 1600   Wound Volume (cm^3) 0.15 cm^3 12/06/23 1600   Post-Procedure Length (cm) 3 cm 12/06/23 1600   Post-Procedure Width (cm) 0.3 cm 12/06/23 1600   Post-Procedure Depth (cm) 0.2 cm 12/06/23 1600   Post-Procedure Surface Area (cm^2) 0.9 cm^2 12/06/23 1600   Post-Procedure Volume (cm^3) 0.18 cm^3 12/06/23 1600   Wound Healing % 72 12/06/23 1600   Tunneling (cm) 0 cm 12/06/23 1600   Tunneling Clock Position of Wound 6 11/10/23 1400   Undermining (cm) 0 cm 12/06/23 1600   Undermining of Wound, 1st Location From 7 o'clock;To 9 o'clock 10/25/23 1500   Wound Odor None 12/06/23 1600   Exposed Structures None 12/06/23 1600   Number of days: 84       Wound 10/25/23 Pressure Injury Right ischial tuberosity (Active)   Wound Image    12/06/23 1600   Site Assessment Yellow;Red;Brown;Dry 11/29/23 1400   Periwound Assessment Blanchable erythema 11/29/23 1400   Margins Attached edges 11/29/23 1400   Closure Secondary intention 11/22/23 1600   Drainage Amount Small 11/29/23 1400   Drainage Description Serosanguineous 11/29/23 1400   Treatments Cleansed;Provider debridement 11/29/23 1400   Wound Cleansing Hypochlorus Acid 11/29/23 1400   Periwound Protectant Barrier Paste 11/29/23 1400   Dressing Status Clean;Dry;Intact 11/17/23 1500   Dressing Changed New 11/29/23 1400   Dressing Cleansing/Solutions Not Applicable 11/29/23 1400   Dressing Options Honey Colloid;Offloading Dressing - Sacral;Komprex Horseshoe;Hypafix Tape 11/29/23 1400   Dressing Change/Treatment Frequency Monday, Wednesday, Friday, and As Needed 11/29/23 1400   Wound Team Following Weekly 12/06/23 1600   WOUND NURSE ONLY - Pressure Injury Stage 3 12/06/23 1600   Wound Length (cm) 2.5 cm 12/06/23 1600   Wound Width (cm) 2 cm 12/06/23 1600   Wound Depth (cm) 0.1 cm 11/29/23 1400   Wound Surface Area (cm^2) 5 cm^2 12/06/23 1600   Wound Volume (cm^3) 0.204 cm^3  11/29/23 1400   Post-Procedure Length (cm) 3 cm 12/06/23 1600   Post-Procedure Width (cm) 2.2 cm 12/06/23 1600   Post-Procedure Depth (cm) 0.3 cm 12/06/23 1600   Post-Procedure Surface Area (cm^2) 6.6 cm^2 12/06/23 1600   Post-Procedure Volume (cm^3) 1.98 cm^3 12/06/23 1600   Wound Healing % -2450 11/29/23 1400   Tunneling (cm) 0 cm 12/06/23 1600   Undermining (cm) 0 cm 12/06/23 1600   Wound Odor None 12/06/23 1600   Exposed Structures None 12/06/23 1600   Number of days: 42       Wound 10/25/23 Full Thickness Wound Toe, 2nd Dorsal Left Nailbed (Active)   Wound Image    12/06/23 1600   Site Assessment Red;Brown 11/29/23 1400   Periwound Assessment Dry 11/29/23 1400   Margins Attached edges 11/29/23 1400   Drainage Amount Small 11/29/23 1400   Drainage Description Serosanguineous 11/29/23 1400   Treatments Cleansed 11/29/23 1400   Wound Cleansing Hypochlorus Acid 11/29/23 1400   Periwound Protectant Barrier Paste 11/29/23 1400   Dressing Status Open to Air 11/22/23 1600   Dressing Cleansing/Solutions Not Applicable 11/29/23 1400   Dressing Options Hydrofiber Silver;Nonadhesive Foam;Hypafix Tape 11/29/23 1400   Dressing Change/Treatment Frequency Monday, Wednesday, Friday, and As Needed 11/29/23 1400   Wound Team Following Weekly 12/06/23 1600   Non-staged Wound Description Full thickness 12/06/23 1600   Wound Length (cm) 0.3 cm 12/06/23 1600   Wound Width (cm) 0.4 cm 12/06/23 1600   Wound Depth (cm) 0.4 cm 12/06/23 1600   Wound Surface Area (cm^2) 0.12 cm^2 12/06/23 1600   Wound Volume (cm^3) 0.048 cm^3 12/06/23 1600   Post-Procedure Length (cm) 0.4 cm 12/06/23 1600   Post-Procedure Width (cm) 0.4 cm 12/06/23 1600   Post-Procedure Depth (cm) 0.4 cm 12/06/23 1600   Post-Procedure Surface Area (cm^2) 0.16 cm^2 12/06/23 1600   Post-Procedure Volume (cm^3) 0.064 cm^3 12/06/23 1600   Wound Healing % -700 12/06/23 1600   Tunneling (cm) 0 cm 12/06/23 1600   Undermining (cm) 0 cm 12/06/23 1600   Wound Odor None 12/06/23 1600    Exposed Structures None 12/06/23 1600   Number of days: 42       Wound 11/29/23 Full Thickness Wound Toe, 2nd Dorsal Left (Active)   Wound Image    12/06/23 1600   Site Assessment Red;[place] 11/29/23 1400   Periwound Assessment Dry 11/29/23 1400   Margins Attached edges 11/29/23 1400   Drainage Amount Small 11/29/23 1400   Drainage Description Serosanguineous 11/29/23 1400   Treatments Cleansed;Nonselective debridement 11/29/23 1400   Wound Cleansing Hypochlorus Acid 11/29/23 1400   Periwound Protectant Barrier Paste 11/29/23 1400   Dressing Cleansing/Solutions Not Applicable 11/29/23 1400   Dressing Options Hydrofiber Silver;Nonadhesive Foam;Hypafix Tape 11/29/23 1400   Dressing Change/Treatment Frequency Monday, Wednesday, Friday, and As Needed 11/29/23 1400   Wound Team Following Weekly 11/29/23 1400   Non-staged Wound Description Full thickness 11/29/23 1400   Wound Length (cm) 0.3 cm 12/06/23 1600   Wound Width (cm) 0.6 cm 12/06/23 1600   Wound Depth (cm) 0.1 cm 12/06/23 1600   Wound Surface Area (cm^2) 0.18 cm^2 12/06/23 1600   Wound Volume (cm^3) 0.018 cm^3 12/06/23 1600   Post-Procedure Length (cm) 0.3 cm 12/06/23 1600   Post-Procedure Width (cm) 0.7 cm 12/06/23 1600   Post-Procedure Depth (cm) 0.1 cm 12/06/23 1600   Post-Procedure Surface Area (cm^2) 0.21 cm^2 12/06/23 1600   Post-Procedure Volume (cm^3) 0.021 cm^3 12/06/23 1600   Wound Healing % -100 12/06/23 1600   Tunneling (cm) 0 cm 12/06/23 1600   Undermining (cm) 0 cm 12/06/23 1600   Wound Odor None 12/06/23 1600   Exposed Structures None 12/06/23 1600   Number of days: 7       PROCEDURE: Excisional debridement of sacral / coccygeal pressure injury ulcers  -2% viscous lidocaine applied topically to wound beds for approximately 5 minutes prior to debridement  -Curette used to debride wound bed.  Excisional debridement was performed to remove devitalized tissue until healthy, bleeding tissue was visualized.  Total wound area debrided 3.48 cm² including  into the wound tract.  Tissue debrided into the muscle / fascia layer.    -Bleeding controlled with manual pressure.    -Wound care completed by wound RN, refer to flowsheet  -Patient tolerated the procedure well, without c/o pain or discomfort.      PROCEDURE: Excisional debridement of right ischial ulcer  -2% viscous lidocaine applied topically to wound bed for approximately 5 minutes prior to debridement  -Curette used to debride wound bed.  Excisional debridement was performed to remove devitalized tissue until healthy, bleeding tissue was visualized.   Entire surface of wound, 6.6 cm² debrided.  Tissue debrided into the subcutaneous layer.    -Bleeding controlled with manual pressure.    -Wound care completed by wound RN, refer to flowsheet  -Patient tolerated the procedure well, without c/o pain or discomfort.      PROCEDURE: Excisional debridement of left second toe wounds x 2  -2% viscous lidocaine applied topically to wound beds for approximately 5 minutes prior to debridement  -Forceps used to lift and remove remaining eschar from nail bed  -Curette then used to debride wound beds of full-thickness wounds x 2 (edge of nail bed and over PIP joint).  Excisional debridement was performed to remove devitalized tissue until healthy, bleeding tissue was visualized.  Total area debrided 0.37 cm².  Tissue debrided into the muscle/fascia layer     -Bleeding controlled with manual pressure.    -Wound care completed by wound RN, refer to flowsheet  -Patient tolerated the procedure well, without c/o pain or discomfort.        No debridement of left second toe wound, right ischium, left heel, left medial lower leg, or left posterior leg wounds required today.    BIOLOGIC LOG  5/20/2022-first application.  PRODUCT: EpiCord 2 x 3 cm . PRODUCT #[id number]; EXPIRES: 2026-12-01 5/27/2022- second application.  PRODUCT: EpiCordEX 2 x 3 cm . PRODUCT #[id number]; EXPIRES: 2026-09-01    6/3/2022- third  "application.  PRODUCT: EpiCordEX 2 x 3 cm . PRODUCT #[id number]; EXPIRES: 2026-10-01    6/10/2022- fourth application.  PRODUCT: EpiCordEX 2 x 3 cm . PRODUCT #[id number]; EXPIRES: 2026-09-01 6/17/2022- fifth application.  PRODUCT: EpiCordEX 2 x 3 cm . PRODUCT #[id number]; EXPIRES: 2027-02-01 6/24/2022- sixth application.  PRODUCT: EpiCordEX 2 x 3 cm . PRODUCT #[id number]; EXPIRES: 2027-02-01 07/01/22: Seventh application.  Product: Epicort Dx 2.0 x 3.0 cm reorder number [id number]; product number [id number]; expires 2027-02-01 7/22/2022- eighth application.  PRODUCT: EpiCord 2 x 3 cm, reorder #[id number]. PRODUCT #[id number]; EXPIRES: 2027-02-01      Pertinent Labs and Diagnostics:    Labs:     A1c: No results found for: \"HBA1C\"     [facility] results, 7/1/2022 (under media tab)    CRP 13    ESR 31      IMAGING:     10/3/2022-CT of pelvis with contrast  IMPRESSION:     1.  Posterior soft tissue sacral wound and 1.5 x 3.7 cm abscess.   2.  Progressive destruction of the distal sacrum and proximal coccyx. Sclerosis and irregularity of the distal sacrum consistent with osteomyelitis.   3.  Old wound within the soft tissues posterior to the distal left SI joint with possible fistulous communication.    10/3/2022-CT of tib-fib with and without contrast, with reconstruction  IMPRESSION:     1.  Old fractures of the left tibia and fibula with extensive callus formation and bony bridging as well as extensive dystrophic calcifications. Similar to previous.   2.  Distal medial soft tissue wounds with ill-defined superficial in the soft tissue thickening and fluid collections suggestive of phlegmon. No organized abscess identified.   3.  No definite osteomyelitis.   4.  Arthritic changes.        VASCULAR STUDIES: No results found.    LAST  WOUND CULTURE:   Lab Results   Component Value Date/Time    CULTRSULT (A) 09/13/2023 04:10 PM     Growth noted after further incubation, see " below for  organism identification.  Light growth usual skin ade.      CULTRSULT (A) 09/13/2023 04:10 PM     Proteus mirabilis ESBL  Light growth  Extended Spectrum Beta-lactamase (ESBL) isolated.  ESBL's may be clinically resistant to therapy with  Penicillins,Cephalosporins or Aztreonam despite  apparent in vitro susceptibility to some of these agents.  The patient requires contact isolation.  Please contact pharmacy or an Infectious Disease Specialist  if you have any questions about appropriate therapy.      CULTRSULT Klebsiella pneumoniae  Light growth   (A) 09/13/2023 04:10 PM    CULTRSULT (A) 09/13/2023 04:10 PM     Methicillin Resistant Staphylococcus aureus  Light growth        PATHOLOGY  2/17/2023-bone fragment extracted from left lower extremity wound\\  FINAL DIAGNOSIS:     A. Left leg bone fragment at base of chronic wound:          Extensively degenerated fibrocartilaginous tissue with a rim of           fibrinopurulent debris          Correlate with culture findings            Comment: While no residual intact bone is identified, these           findings are suggestive of adjacent osteomyelitis.      ASSESSMENT AND PLAN:     1. Sacral decubitus ulcer, stage IV ([facility])  Comments: Ulcer first noted in early April 2022 as small open area which quickly enlarged.  This ulcer is present distal from previous sacral ulcer which healed after surgery in January.  Patient has history of flap reconstruction x2 to this area.  CT shows progressive destruction of distal sacrum and proximal coccyx.    12/6/2023: Total wound area has again decreased.  Satellite wounds at distal edge of largest wound wax and wane, today he presents with just 1 open wound at the site.  - Excisional debridement was performed in clinic, medically necessary to promote wound healing.   -Patient to return to clinic weekly for assessment and debridement  -Home health to change dressing 2 times per week   -Patient does spend a lot of time up  in his wheelchair, and wishes to continue to do so for his quality of life.  He lives independently, drives, and is involved with family activities.  He does have an appropriate pressure-relief cushion and is very well versed on pressure relieving techniques.  - Patient does not currently have follow up with Dr. Saini. If wound deteriorates or fails to improve can consider re-establishing with Dr. Saini for evaluation for coverage options. Patient does not want to pursue at this time and is happy with current conservative approach even with wound worsening.  - Known OM that has already been treated. No utility of Abx unless gross soft tissue infection which does not appear to be present today.    Wound care: Collagen, Hydrofiber silver strip, hydrofiber silver, Mepilex    2. Postoperative wound dehiscence, subsequent encounter  3. Osteomyelitis of left lower extremity  Comments: On 4/26/2022 patient underwent irrigation and debridement of multiple compartments of the left lower extremity states for pressure injury, with bone excision, and complex closure of chronic wound using biologic skin substitute.  During postop visit in surgeons office on 5/11, surgical site was noted to be dehisced.  Patient was referred to wound clinic for management.    12/6/2023: Wound area has decreased.  This wound tends to wax and wane  -No excisional debridement required today  -Patient to return to clinic weekly for assessment and debridement as needed  -Home health to change dressing 1-2 times per week in between clinic visits OM.  -Suspect underlying OM, however patient does not wish to consider surgical options at this time nor does his surgeon wish to do any further surgery to this leg. 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 There is no gross soft tissue infection and repeated Abx treatment without source control is not indicated.    -We will assess these wounds each clinic visit  -Patient to be  mindful of offloading when supine, should assure that his leg is not rotating medially  Wound care: Hydrofiber silver, silicone adhesive foam, tubigrip    4. Deep tissue injury  5. Pressure injury of left foot, stage IV  Comments: DTI to posterior left lower leg first observed in clinic 7/29/2022.  Patient does not know how it started, had been wearing heel float boot consistently.  Evolved into stage IV pressure injury    12/6/2023: Plantar foot ulcer remains healed, discoloration fading.  Posterior leg wound has decreased significantly in area, nearly resolved.  This wound has healed and reopened several times.  -No excisional debridement required today.  -Patient to return to clinic weekly for assessment and debridement  -Home health to change dressing 1-2 times per week in between clinic visits        Wound care-posterior leg wound: Silver Hydrofiber to manage exudate and bioburden, sacral foam cover dressing,    6. Pressure injury of left heel, stage 3 ([facility])  Comments: First noted in clinic on 10/21/2022, originally noted to be stage II    12/6/2023: Skin remains intact though is fragile.   -We will continue to monitor wound each visit, high risk for recurrence  - Patient continue wearing heel float boots at all times  - Heel does not appear to contact any solid surfaces while in wheelchair   Wound Care: Heel Mepilex to reduce pressure and friction    7.  Pressure injury of right buttock/ischium, stage III    12/6/2023: Right ischial wound continues to deteriorate.  Area and depth significantly increased.  -Excisional debridement of wound in clinic today, medically necessary to promote wound healing.  -Patient to return to clinic weekly for assessment and debridement  -Patient to change dressing 1-2 times per week in between clinic visits   -Offloading strategies reviewed.  Patient is not willing to give up his time up in his wheelchair due to quality of life.  He does have an excellent offloading cushion in  his wheelchair, so I am not convinced that this is the problem.    Wound care: Honey alginate for autolytic debridement,  silicone foam cover dressing    8. Quadriplegia, C5-C7, incomplete ([facility])  Comments: Complicating factor.  Impaired mobility and sensation  -Patient is still spending 7-8 hours/day up in his wheelchair, though he does have appropriate offloading cushion, and knows to reposition frequently.  Wears heel float boots bilaterally at all times    9.  Injury of toenail of left foot, subsequent encounter  10.  Open wound of toe, subsequent encounter  11. Infected wound    12/6/2023: Remaining eschar was removed from nail bed today, underlying skin intact.  However, there is a new ulcer at the base of the nailbed that probes close to bone.  He also has a small full-thickness wound over the PIP joint.  Entire toe is erythemic and edematous.  -Approximately half of the eschar was trimmed in clinic today, exposing red wound bed  -Excisional debridement of new wounds in clinic today, medically necessary to promote wound healing  -X-ray ordered  -Rx for Augmentin sent to patient's pharmacy.    Wound care: Silver Hydrofiber to manage exudate and bioburden, foam cover dressing, Hypafix tape     My total time spent caring for the patient on the day of the encounter was 20 minutes.   This does not include time spent on separately billable procedures/tests.       Please note that this note may have been created using voice recognition software. I have worked with technical experts from [facility] to optimize the interface.  I have made every reasonable attempt to correct obvious errors, but there may be errors of grammar and possibly content that I did not discover before finalizing the note.

## 2023-12-06 NOTE — TELEPHONE ENCOUNTER
S/w patient regarding remote transmission.  Patient did not report having any symptoms at this time and did not have any during episodes.   Reports stable HR and BP.   Currently taking all medications, and no changes.   No reports of triggers.   ER precautions discussed for any symptoms and to call out office.   Patient verbalized understanding.

## 2023-12-07 NOTE — PROGRESS NOTES
Home wound care order updated and routed to [facility] via [employer].    no chest pain, no cough, and no shortness of breath.

## 2023-12-11 ENCOUNTER — APPOINTMENT (OUTPATIENT)
Dept: RADIOLOGY | Facility: MEDICAL CENTER | Age: 73
DRG: 500 | End: 2023-12-11
Attending: EMERGENCY MEDICINE
Payer: MEDICARE

## 2023-12-11 ENCOUNTER — HOSPITAL ENCOUNTER (INPATIENT)
Facility: MEDICAL CENTER | Age: 73
LOS: 11 days | DRG: 500 | End: 2023-12-23
Attending: EMERGENCY MEDICINE | Admitting: INTERNAL MEDICINE
Payer: MEDICARE

## 2023-12-11 DIAGNOSIS — L89.313 PRESSURE INJURY OF RIGHT BUTTOCK, STAGE 3 (HCC): ICD-10-CM

## 2023-12-11 DIAGNOSIS — I10 PRIMARY HYPERTENSION: ICD-10-CM

## 2023-12-11 DIAGNOSIS — M86.9 OSTEOMYELITIS OF LEFT ANKLE, UNSPECIFIED TYPE (HCC): ICD-10-CM

## 2023-12-11 DIAGNOSIS — L98.429 SKIN ULCER OF SACRUM, UNSPECIFIED ULCER STAGE (HCC): ICD-10-CM

## 2023-12-11 DIAGNOSIS — N39.0 SEPSIS SECONDARY TO UTI (HCC): ICD-10-CM

## 2023-12-11 DIAGNOSIS — Z93.59 SUPRAPUBIC CATHETER (HCC): ICD-10-CM

## 2023-12-11 DIAGNOSIS — S91.302A OPEN WOUND OF LEFT FOOT, INITIAL ENCOUNTER: ICD-10-CM

## 2023-12-11 DIAGNOSIS — I48.92 PAROXYSMAL ATRIAL FLUTTER (HCC): ICD-10-CM

## 2023-12-11 DIAGNOSIS — Z95.818 PRESENCE OF WATCHMAN LEFT ATRIAL APPENDAGE CLOSURE DEVICE: ICD-10-CM

## 2023-12-11 DIAGNOSIS — M19.021 OSTEOARTHRITIS OF BOTH ELBOWS, UNSPECIFIED OSTEOARTHRITIS TYPE: ICD-10-CM

## 2023-12-11 DIAGNOSIS — L89.524 PRESSURE ULCER OF LEFT ANKLE, STAGE 4 (HCC): ICD-10-CM

## 2023-12-11 DIAGNOSIS — D68.318 ACQUIRED CIRCULATING ANTICOAGULANTS (HCC): ICD-10-CM

## 2023-12-11 DIAGNOSIS — I10 ESSENTIAL HYPERTENSION: Chronic | ICD-10-CM

## 2023-12-11 DIAGNOSIS — G82.50 CHRONIC INCOMPLETE QUADRIPLEGIA (HCC): ICD-10-CM

## 2023-12-11 DIAGNOSIS — R94.31 PROLONGED QT INTERVAL: ICD-10-CM

## 2023-12-11 DIAGNOSIS — D69.6 THROMBOCYTOPENIA (HCC): ICD-10-CM

## 2023-12-11 DIAGNOSIS — E66.9 OBESITY, UNSPECIFIED CLASSIFICATION, UNSPECIFIED OBESITY TYPE, UNSPECIFIED WHETHER SERIOUS COMORBIDITY PRESENT: ICD-10-CM

## 2023-12-11 DIAGNOSIS — N13.9 OBSTRUCTIVE UROPATHY: ICD-10-CM

## 2023-12-11 DIAGNOSIS — R73.9 HYPERGLYCEMIA: ICD-10-CM

## 2023-12-11 DIAGNOSIS — D75.89 MACROCYTOSIS: ICD-10-CM

## 2023-12-11 DIAGNOSIS — M19.022 OSTEOARTHRITIS OF BOTH ELBOWS, UNSPECIFIED OSTEOARTHRITIS TYPE: ICD-10-CM

## 2023-12-11 DIAGNOSIS — D53.9 MACROCYTIC ANEMIA: ICD-10-CM

## 2023-12-11 DIAGNOSIS — L89.894 PRESSURE INJURY OF LEFT FOOT, STAGE 4 (HCC): ICD-10-CM

## 2023-12-11 DIAGNOSIS — I48.0 PAROXYSMAL ATRIAL FIBRILLATION (HCC): ICD-10-CM

## 2023-12-11 DIAGNOSIS — M86.9 OSTEOMYELITIS, UNSPECIFIED SITE, UNSPECIFIED TYPE (HCC): ICD-10-CM

## 2023-12-11 DIAGNOSIS — Z93.3 COLOSTOMY IN PLACE (HCC): ICD-10-CM

## 2023-12-11 DIAGNOSIS — N20.0 NEPHROLITHIASIS: ICD-10-CM

## 2023-12-11 DIAGNOSIS — R65.10 SIRS (SYSTEMIC INFLAMMATORY RESPONSE SYNDROME) (HCC): ICD-10-CM

## 2023-12-11 DIAGNOSIS — N12 PYELONEPHRITIS: ICD-10-CM

## 2023-12-11 DIAGNOSIS — Z93.3 PRESENCE OF COLOSTOMY (HCC): ICD-10-CM

## 2023-12-11 DIAGNOSIS — G82.53 QUADRIPLEGIA, C5-C7 COMPLETE (HCC): Chronic | ICD-10-CM

## 2023-12-11 DIAGNOSIS — L97.923: ICD-10-CM

## 2023-12-11 DIAGNOSIS — A41.9 SEPSIS WITHOUT ACUTE ORGAN DYSFUNCTION, DUE TO UNSPECIFIED ORGANISM (HCC): ICD-10-CM

## 2023-12-11 DIAGNOSIS — Z71.89 ADVANCE CARE PLANNING: ICD-10-CM

## 2023-12-11 DIAGNOSIS — A41.9 SEPSIS SECONDARY TO UTI (HCC): ICD-10-CM

## 2023-12-11 DIAGNOSIS — N31.9 NEUROGENIC BLADDER: ICD-10-CM

## 2023-12-11 DIAGNOSIS — R79.89 LOW TSH LEVEL: ICD-10-CM

## 2023-12-11 DIAGNOSIS — N39.0 FREQUENT UTI: ICD-10-CM

## 2023-12-11 DIAGNOSIS — L89.319 DECUBITUS ULCER OF RIGHT ISCHIAL AREA, UNSPECIFIED ULCER STAGE: Primary | ICD-10-CM

## 2023-12-11 DIAGNOSIS — I31.39 PERICARDIAL EFFUSION: ICD-10-CM

## 2023-12-11 LAB
ALBUMIN SERPL BCP-MCNC: 3.8 G/DL (ref 3.2–4.9)
ALBUMIN/GLOB SERPL: 1 G/DL
ALP SERPL-CCNC: 66 U/L (ref 30–99)
ALT SERPL-CCNC: 7 U/L (ref 2–50)
ANION GAP SERPL CALC-SCNC: 11 MMOL/L (ref 7–16)
APPEARANCE UR: CLEAR
AST SERPL-CCNC: 10 U/L (ref 12–45)
BACTERIA #/AREA URNS HPF: ABNORMAL /HPF
BASOPHILS # BLD AUTO: 0.2 % (ref 0–1.8)
BASOPHILS # BLD: 0.02 K/UL (ref 0–0.12)
BILIRUB SERPL-MCNC: 0.8 MG/DL (ref 0.1–1.5)
BILIRUB UR QL STRIP.AUTO: NEGATIVE
BUN SERPL-MCNC: 17 MG/DL (ref 8–22)
CALCIUM ALBUM COR SERPL-MCNC: 9.1 MG/DL (ref 8.5–10.5)
CALCIUM SERPL-MCNC: 8.9 MG/DL (ref 8.4–10.2)
CHLORIDE SERPL-SCNC: 102 MMOL/L (ref 96–112)
CO2 SERPL-SCNC: 25 MMOL/L (ref 20–33)
COLOR UR: YELLOW
CREAT SERPL-MCNC: 0.76 MG/DL (ref 0.5–1.4)
EOSINOPHIL # BLD AUTO: 0.02 K/UL (ref 0–0.51)
EOSINOPHIL NFR BLD: 0.2 % (ref 0–6.9)
EPI CELLS #/AREA URNS HPF: ABNORMAL /HPF
ERYTHROCYTE [DISTWIDTH] IN BLOOD BY AUTOMATED COUNT: 48.8 FL (ref 35.9–50)
FLUAV RNA SPEC QL NAA+PROBE: NEGATIVE
FLUBV RNA SPEC QL NAA+PROBE: NEGATIVE
GFR SERPLBLD CREATININE-BSD FMLA CKD-EPI: 95 ML/MIN/1.73 M 2
GLOBULIN SER CALC-MCNC: 3.7 G/DL (ref 1.9–3.5)
GLUCOSE SERPL-MCNC: 115 MG/DL (ref 65–99)
GLUCOSE UR STRIP.AUTO-MCNC: NEGATIVE MG/DL
HCT VFR BLD AUTO: 40.2 % (ref 42–52)
HGB BLD-MCNC: 13.6 G/DL (ref 14–18)
IMM GRANULOCYTES # BLD AUTO: 0.07 K/UL (ref 0–0.11)
IMM GRANULOCYTES NFR BLD AUTO: 0.6 % (ref 0–0.9)
KETONES UR STRIP.AUTO-MCNC: 40 MG/DL
LACTATE SERPL-SCNC: 1.9 MMOL/L (ref 0.5–2)
LEUKOCYTE ESTERASE UR QL STRIP.AUTO: NEGATIVE
LYMPHOCYTES # BLD AUTO: 0.83 K/UL (ref 1–4.8)
LYMPHOCYTES NFR BLD: 6.9 % (ref 22–41)
MCH RBC QN AUTO: 34.3 PG (ref 27–33)
MCHC RBC AUTO-ENTMCNC: 33.8 G/DL (ref 32.3–36.5)
MCV RBC AUTO: 101.3 FL (ref 81.4–97.8)
MICRO URNS: ABNORMAL
MONOCYTES # BLD AUTO: 1.23 K/UL (ref 0–0.85)
MONOCYTES NFR BLD AUTO: 10.3 % (ref 0–13.4)
NEUTROPHILS # BLD AUTO: 9.82 K/UL (ref 1.82–7.42)
NEUTROPHILS NFR BLD: 81.8 % (ref 44–72)
NITRITE UR QL STRIP.AUTO: NEGATIVE
NRBC # BLD AUTO: 0 K/UL
NRBC BLD-RTO: 0 /100 WBC (ref 0–0.2)
PH UR STRIP.AUTO: 6 [PH] (ref 5–8)
PLATELET # BLD AUTO: 152 K/UL (ref 164–446)
PMV BLD AUTO: 9.3 FL (ref 9–12.9)
POTASSIUM SERPL-SCNC: 3.8 MMOL/L (ref 3.6–5.5)
PROT SERPL-MCNC: 7.5 G/DL (ref 6–8.2)
PROT UR QL STRIP: 100 MG/DL
RBC # BLD AUTO: 3.97 M/UL (ref 4.7–6.1)
RBC # URNS HPF: ABNORMAL /HPF
RBC UR QL AUTO: ABNORMAL
RSV RNA SPEC QL NAA+PROBE: NEGATIVE
SARS-COV-2 RNA RESP QL NAA+PROBE: NOTDETECTED
SODIUM SERPL-SCNC: 138 MMOL/L (ref 135–145)
SP GR UR STRIP.AUTO: 1.02
SPECIMEN SOURCE: NORMAL
WBC # BLD AUTO: 12 K/UL (ref 4.8–10.8)
WBC #/AREA URNS HPF: ABNORMAL /HPF
YEAST #/AREA URNS HPF: ABNORMAL /HPF
YEAST BUDDING URNS QL: PRESENT /HPF
YEAST HYPHAE #/AREA URNS HPF: PRESENT /HPF

## 2023-12-11 PROCEDURE — 85025 COMPLETE CBC W/AUTO DIFF WBC: CPT

## 2023-12-11 PROCEDURE — 99285 EMERGENCY DEPT VISIT HI MDM: CPT

## 2023-12-11 PROCEDURE — 700117 HCHG RX CONTRAST REV CODE 255: Performed by: EMERGENCY MEDICINE

## 2023-12-11 PROCEDURE — 87040 BLOOD CULTURE FOR BACTERIA: CPT

## 2023-12-11 PROCEDURE — 0241U HCHG SARS-COV-2 COVID-19 NFCT DS RESP RNA 4 TRGT MIC: CPT

## 2023-12-11 PROCEDURE — 96365 THER/PROPH/DIAG IV INF INIT: CPT

## 2023-12-11 PROCEDURE — 94760 N-INVAS EAR/PLS OXIMETRY 1: CPT

## 2023-12-11 PROCEDURE — 80053 COMPREHEN METABOLIC PANEL: CPT

## 2023-12-11 PROCEDURE — 700105 HCHG RX REV CODE 258: Performed by: EMERGENCY MEDICINE

## 2023-12-11 PROCEDURE — 71045 X-RAY EXAM CHEST 1 VIEW: CPT

## 2023-12-11 PROCEDURE — 99223 1ST HOSP IP/OBS HIGH 75: CPT | Mod: AI | Performed by: INTERNAL MEDICINE

## 2023-12-11 PROCEDURE — 36415 COLL VENOUS BLD VENIPUNCTURE: CPT

## 2023-12-11 PROCEDURE — 81001 URINALYSIS AUTO W/SCOPE: CPT

## 2023-12-11 PROCEDURE — C9803 HOPD COVID-19 SPEC COLLECT: HCPCS | Performed by: EMERGENCY MEDICINE

## 2023-12-11 PROCEDURE — 73620 X-RAY EXAM OF FOOT: CPT | Mod: LT

## 2023-12-11 PROCEDURE — 83605 ASSAY OF LACTIC ACID: CPT

## 2023-12-11 PROCEDURE — 74177 CT ABD & PELVIS W/CONTRAST: CPT

## 2023-12-11 RX ORDER — SODIUM CHLORIDE, SODIUM LACTATE, POTASSIUM CHLORIDE, AND CALCIUM CHLORIDE .6; .31; .03; .02 G/100ML; G/100ML; G/100ML; G/100ML
1000 INJECTION, SOLUTION INTRAVENOUS ONCE
Status: COMPLETED | OUTPATIENT
Start: 2023-12-11 | End: 2023-12-12

## 2023-12-11 RX ADMIN — IOHEXOL 100 ML: 350 INJECTION, SOLUTION INTRAVENOUS at 22:27

## 2023-12-11 RX ADMIN — SODIUM CHLORIDE, POTASSIUM CHLORIDE, SODIUM LACTATE AND CALCIUM CHLORIDE 1000 ML: 600; 310; 30; 20 INJECTION, SOLUTION INTRAVENOUS at 20:15

## 2023-12-11 ASSESSMENT — FIBROSIS 4 INDEX: FIB4 SCORE: 2.97

## 2023-12-12 ENCOUNTER — ANESTHESIA (OUTPATIENT)
Dept: SURGERY | Facility: MEDICAL CENTER | Age: 73
DRG: 500 | End: 2023-12-12
Payer: MEDICARE

## 2023-12-12 ENCOUNTER — ANESTHESIA EVENT (OUTPATIENT)
Dept: SURGERY | Facility: MEDICAL CENTER | Age: 73
DRG: 500 | End: 2023-12-12
Payer: MEDICARE

## 2023-12-12 ENCOUNTER — APPOINTMENT (OUTPATIENT)
Dept: CARDIOLOGY | Facility: MEDICAL CENTER | Age: 73
DRG: 500 | End: 2023-12-12
Attending: INTERNAL MEDICINE
Payer: MEDICARE

## 2023-12-12 PROBLEM — R65.10 SIRS (SYSTEMIC INFLAMMATORY RESPONSE SYNDROME) (HCC): Status: ACTIVE | Noted: 2023-12-12

## 2023-12-12 PROBLEM — I31.39 PERICARDIAL EFFUSION: Status: ACTIVE | Noted: 2023-12-12

## 2023-12-12 PROBLEM — R73.9 HYPERGLYCEMIA: Status: ACTIVE | Noted: 2023-12-12

## 2023-12-12 PROBLEM — M86.9 OSTEOMYELITIS (HCC): Status: ACTIVE | Noted: 2023-12-12

## 2023-12-12 LAB
CRP SERPL HS-MCNC: 19.12 MG/DL (ref 0–0.75)
GRAM STN SPEC: NORMAL
LV EJECT FRACT  99904: 60
LV EJECT FRACT MOD 2C 99903: 60.28
LV EJECT FRACT MOD 4C 99902: 56.74
LV EJECT FRACT MOD BP 99901: 59.24
PATHOLOGY CONSULT NOTE: NORMAL
SCCMEC + MECA PNL NOSE NAA+PROBE: NEGATIVE
SIGNIFICANT IND 70042: NORMAL
SITE SITE: NORMAL
SOURCE SOURCE: NORMAL

## 2023-12-12 PROCEDURE — 700111 HCHG RX REV CODE 636 W/ 250 OVERRIDE (IP)

## 2023-12-12 PROCEDURE — 87075 CULTR BACTERIA EXCEPT BLOOD: CPT

## 2023-12-12 PROCEDURE — 88304 TISSUE EXAM BY PATHOLOGIST: CPT

## 2023-12-12 PROCEDURE — 160027 HCHG SURGERY MINUTES - 1ST 30 MINS LEVEL 2: Performed by: STUDENT IN AN ORGANIZED HEALTH CARE EDUCATION/TRAINING PROGRAM

## 2023-12-12 PROCEDURE — 87102 FUNGUS ISOLATION CULTURE: CPT

## 2023-12-12 PROCEDURE — 700111 HCHG RX REV CODE 636 W/ 250 OVERRIDE (IP): Mod: JZ | Performed by: ANESTHESIOLOGY

## 2023-12-12 PROCEDURE — 160048 HCHG OR STATISTICAL LEVEL 1-5: Performed by: STUDENT IN AN ORGANIZED HEALTH CARE EDUCATION/TRAINING PROGRAM

## 2023-12-12 PROCEDURE — 87186 SC STD MICRODIL/AGAR DIL: CPT | Mod: 91

## 2023-12-12 PROCEDURE — 700101 HCHG RX REV CODE 250: Performed by: INTERNAL MEDICINE

## 2023-12-12 PROCEDURE — 87116 MYCOBACTERIA CULTURE: CPT

## 2023-12-12 PROCEDURE — 700111 HCHG RX REV CODE 636 W/ 250 OVERRIDE (IP): Performed by: EMERGENCY MEDICINE

## 2023-12-12 PROCEDURE — 93306 TTE W/DOPPLER COMPLETE: CPT | Mod: 26 | Performed by: INTERNAL MEDICINE

## 2023-12-12 PROCEDURE — 700105 HCHG RX REV CODE 258

## 2023-12-12 PROCEDURE — 0KBN0ZZ EXCISION OF RIGHT HIP MUSCLE, OPEN APPROACH: ICD-10-PCS | Performed by: STUDENT IN AN ORGANIZED HEALTH CARE EDUCATION/TRAINING PROGRAM

## 2023-12-12 PROCEDURE — 700105 HCHG RX REV CODE 258: Performed by: EMERGENCY MEDICINE

## 2023-12-12 PROCEDURE — 94760 N-INVAS EAR/PLS OXIMETRY 1: CPT

## 2023-12-12 PROCEDURE — 93306 TTE W/DOPPLER COMPLETE: CPT

## 2023-12-12 PROCEDURE — 87015 SPECIMEN INFECT AGNT CONCNTJ: CPT | Mod: 91

## 2023-12-12 PROCEDURE — 160009 HCHG ANES TIME/MIN: Performed by: STUDENT IN AN ORGANIZED HEALTH CARE EDUCATION/TRAINING PROGRAM

## 2023-12-12 PROCEDURE — 87077 CULTURE AEROBIC IDENTIFY: CPT

## 2023-12-12 PROCEDURE — 99232 SBSQ HOSP IP/OBS MODERATE 35: CPT | Performed by: INTERNAL MEDICINE

## 2023-12-12 PROCEDURE — 87070 CULTURE OTHR SPECIMN AEROBIC: CPT

## 2023-12-12 PROCEDURE — 160002 HCHG RECOVERY MINUTES (STAT): Performed by: STUDENT IN AN ORGANIZED HEALTH CARE EDUCATION/TRAINING PROGRAM

## 2023-12-12 PROCEDURE — 86140 C-REACTIVE PROTEIN: CPT

## 2023-12-12 PROCEDURE — 36415 COLL VENOUS BLD VENIPUNCTURE: CPT

## 2023-12-12 PROCEDURE — A9270 NON-COVERED ITEM OR SERVICE: HCPCS | Performed by: INTERNAL MEDICINE

## 2023-12-12 PROCEDURE — 700105 HCHG RX REV CODE 258: Performed by: INTERNAL MEDICINE

## 2023-12-12 PROCEDURE — 700101 HCHG RX REV CODE 250

## 2023-12-12 PROCEDURE — 700105 HCHG RX REV CODE 258: Performed by: STUDENT IN AN ORGANIZED HEALTH CARE EDUCATION/TRAINING PROGRAM

## 2023-12-12 PROCEDURE — 700102 HCHG RX REV CODE 250 W/ 637 OVERRIDE(OP): Performed by: INTERNAL MEDICINE

## 2023-12-12 PROCEDURE — 770001 HCHG ROOM/CARE - MED/SURG/GYN PRIV*

## 2023-12-12 PROCEDURE — 700101 HCHG RX REV CODE 250: Performed by: ANESTHESIOLOGY

## 2023-12-12 PROCEDURE — 87206 SMEAR FLUORESCENT/ACID STAI: CPT

## 2023-12-12 PROCEDURE — 87205 SMEAR GRAM STAIN: CPT

## 2023-12-12 PROCEDURE — 700111 HCHG RX REV CODE 636 W/ 250 OVERRIDE (IP): Performed by: INTERNAL MEDICINE

## 2023-12-12 PROCEDURE — 302098 PASTE RING (FLAT): Performed by: INTERNAL MEDICINE

## 2023-12-12 PROCEDURE — 160038 HCHG SURGERY MINUTES - EA ADDL 1 MIN LEVEL 2: Performed by: STUDENT IN AN ORGANIZED HEALTH CARE EDUCATION/TRAINING PROGRAM

## 2023-12-12 PROCEDURE — 97602 WOUND(S) CARE NON-SELECTIVE: CPT

## 2023-12-12 PROCEDURE — 160035 HCHG PACU - 1ST 60 MINS PHASE I: Performed by: STUDENT IN AN ORGANIZED HEALTH CARE EDUCATION/TRAINING PROGRAM

## 2023-12-12 PROCEDURE — 87641 MR-STAPH DNA AMP PROBE: CPT

## 2023-12-12 RX ORDER — AMOXICILLIN 250 MG
2 CAPSULE ORAL 2 TIMES DAILY
Status: DISCONTINUED | OUTPATIENT
Start: 2023-12-12 | End: 2023-12-14

## 2023-12-12 RX ORDER — ONDANSETRON 2 MG/ML
INJECTION INTRAMUSCULAR; INTRAVENOUS PRN
Status: DISCONTINUED | OUTPATIENT
Start: 2023-12-12 | End: 2023-12-12 | Stop reason: SURG

## 2023-12-12 RX ORDER — LIDOCAINE HYDROCHLORIDE 20 MG/ML
INJECTION, SOLUTION EPIDURAL; INFILTRATION; INTRACAUDAL; PERINEURAL PRN
Status: DISCONTINUED | OUTPATIENT
Start: 2023-12-12 | End: 2023-12-12 | Stop reason: SURG

## 2023-12-12 RX ORDER — OXYCODONE HCL 5 MG/5 ML
10 SOLUTION, ORAL ORAL
Status: DISCONTINUED | OUTPATIENT
Start: 2023-12-12 | End: 2023-12-12 | Stop reason: HOSPADM

## 2023-12-12 RX ORDER — ONDANSETRON 2 MG/ML
4 INJECTION INTRAMUSCULAR; INTRAVENOUS EVERY 4 HOURS PRN
Status: DISCONTINUED | OUTPATIENT
Start: 2023-12-12 | End: 2023-12-23 | Stop reason: HOSPADM

## 2023-12-12 RX ORDER — FERROUS SULFATE 325(65) MG
325 TABLET ORAL 2 TIMES DAILY
Status: DISCONTINUED | OUTPATIENT
Start: 2023-12-12 | End: 2023-12-23 | Stop reason: HOSPADM

## 2023-12-12 RX ORDER — HALOPERIDOL 5 MG/ML
1 INJECTION INTRAMUSCULAR
Status: DISCONTINUED | OUTPATIENT
Start: 2023-12-12 | End: 2023-12-12 | Stop reason: HOSPADM

## 2023-12-12 RX ORDER — HYDROMORPHONE HYDROCHLORIDE 1 MG/ML
0.1 INJECTION, SOLUTION INTRAMUSCULAR; INTRAVENOUS; SUBCUTANEOUS
Status: DISCONTINUED | OUTPATIENT
Start: 2023-12-12 | End: 2023-12-12 | Stop reason: HOSPADM

## 2023-12-12 RX ORDER — AMLODIPINE BESYLATE 5 MG/1
5 TABLET ORAL DAILY
Status: DISCONTINUED | OUTPATIENT
Start: 2023-12-12 | End: 2023-12-12

## 2023-12-12 RX ORDER — HYDROMORPHONE HYDROCHLORIDE 1 MG/ML
0.4 INJECTION, SOLUTION INTRAMUSCULAR; INTRAVENOUS; SUBCUTANEOUS
Status: DISCONTINUED | OUTPATIENT
Start: 2023-12-12 | End: 2023-12-12 | Stop reason: HOSPADM

## 2023-12-12 RX ORDER — ROCURONIUM BROMIDE 10 MG/ML
INJECTION, SOLUTION INTRAVENOUS PRN
Status: DISCONTINUED | OUTPATIENT
Start: 2023-12-12 | End: 2023-12-12 | Stop reason: SURG

## 2023-12-12 RX ORDER — BACLOFEN 10 MG/1
20 TABLET ORAL
Status: DISCONTINUED | OUTPATIENT
Start: 2023-12-12 | End: 2023-12-23 | Stop reason: HOSPADM

## 2023-12-12 RX ORDER — BISACODYL 10 MG
10 SUPPOSITORY, RECTAL RECTAL
Status: DISCONTINUED | OUTPATIENT
Start: 2023-12-12 | End: 2023-12-14

## 2023-12-12 RX ORDER — LABETALOL HYDROCHLORIDE 5 MG/ML
10 INJECTION, SOLUTION INTRAVENOUS EVERY 4 HOURS PRN
Status: DISCONTINUED | OUTPATIENT
Start: 2023-12-12 | End: 2023-12-19

## 2023-12-12 RX ORDER — OXYCODONE HCL 5 MG/5 ML
5 SOLUTION, ORAL ORAL
Status: DISCONTINUED | OUTPATIENT
Start: 2023-12-12 | End: 2023-12-12 | Stop reason: HOSPADM

## 2023-12-12 RX ORDER — ACETAMINOPHEN 325 MG/1
650 TABLET ORAL EVERY 6 HOURS PRN
Status: DISCONTINUED | OUTPATIENT
Start: 2023-12-12 | End: 2023-12-23 | Stop reason: HOSPADM

## 2023-12-12 RX ORDER — ONDANSETRON 4 MG/1
4 TABLET, ORALLY DISINTEGRATING ORAL EVERY 4 HOURS PRN
Status: DISCONTINUED | OUTPATIENT
Start: 2023-12-12 | End: 2023-12-23 | Stop reason: HOSPADM

## 2023-12-12 RX ORDER — SODIUM CHLORIDE, SODIUM LACTATE, POTASSIUM CHLORIDE, CALCIUM CHLORIDE 600; 310; 30; 20 MG/100ML; MG/100ML; MG/100ML; MG/100ML
INJECTION, SOLUTION INTRAVENOUS CONTINUOUS
Status: DISCONTINUED | OUTPATIENT
Start: 2023-12-12 | End: 2023-12-12

## 2023-12-12 RX ORDER — DEXAMETHASONE SODIUM PHOSPHATE 4 MG/ML
INJECTION, SOLUTION INTRA-ARTICULAR; INTRALESIONAL; INTRAMUSCULAR; INTRAVENOUS; SOFT TISSUE PRN
Status: DISCONTINUED | OUTPATIENT
Start: 2023-12-12 | End: 2023-12-12 | Stop reason: SURG

## 2023-12-12 RX ORDER — AMLODIPINE BESYLATE 5 MG/1
5 TABLET ORAL PRN
COMMUNITY
End: 2024-03-26 | Stop reason: SDUPTHER

## 2023-12-12 RX ORDER — POLYETHYLENE GLYCOL 3350 17 G/17G
1 POWDER, FOR SOLUTION ORAL
Status: DISCONTINUED | OUTPATIENT
Start: 2023-12-12 | End: 2023-12-14

## 2023-12-12 RX ORDER — DIPHENHYDRAMINE HYDROCHLORIDE 50 MG/ML
12.5 INJECTION INTRAMUSCULAR; INTRAVENOUS
Status: DISCONTINUED | OUTPATIENT
Start: 2023-12-12 | End: 2023-12-12 | Stop reason: HOSPADM

## 2023-12-12 RX ORDER — LANOLIN ALCOHOL/MO/W.PET/CERES
400 CREAM (GRAM) TOPICAL EVERY MORNING
Status: DISCONTINUED | OUTPATIENT
Start: 2023-12-12 | End: 2023-12-12

## 2023-12-12 RX ORDER — SODIUM CHLORIDE 9 MG/ML
INJECTION, SOLUTION INTRAVENOUS CONTINUOUS
Status: DISCONTINUED | OUTPATIENT
Start: 2023-12-12 | End: 2023-12-13

## 2023-12-12 RX ORDER — CHOLECALCIFEROL (VITAMIN D3) 125 MCG
10 CAPSULE ORAL
Status: DISCONTINUED | OUTPATIENT
Start: 2023-12-12 | End: 2023-12-23 | Stop reason: HOSPADM

## 2023-12-12 RX ORDER — MEPERIDINE HYDROCHLORIDE 25 MG/ML
12.5 INJECTION INTRAMUSCULAR; INTRAVENOUS; SUBCUTANEOUS
Status: DISCONTINUED | OUTPATIENT
Start: 2023-12-12 | End: 2023-12-12 | Stop reason: HOSPADM

## 2023-12-12 RX ORDER — SODIUM HYPOCHLORITE 1.25 MG/ML
SOLUTION TOPICAL 2 TIMES DAILY
Status: DISCONTINUED | OUTPATIENT
Start: 2023-12-12 | End: 2023-12-12 | Stop reason: ALTCHOICE

## 2023-12-12 RX ORDER — LANOLIN ALCOHOL/MO/W.PET/CERES
400 CREAM (GRAM) TOPICAL EVERY MORNING
Status: DISCONTINUED | OUTPATIENT
Start: 2023-12-13 | End: 2023-12-23 | Stop reason: HOSPADM

## 2023-12-12 RX ORDER — LOSARTAN POTASSIUM 50 MG/1
50 TABLET ORAL EVERY EVENING
Status: DISCONTINUED | OUTPATIENT
Start: 2023-12-12 | End: 2023-12-23 | Stop reason: HOSPADM

## 2023-12-12 RX ORDER — SODIUM CHLORIDE, SODIUM LACTATE, POTASSIUM CHLORIDE, CALCIUM CHLORIDE 600; 310; 30; 20 MG/100ML; MG/100ML; MG/100ML; MG/100ML
INJECTION, SOLUTION INTRAVENOUS CONTINUOUS
Status: DISCONTINUED | OUTPATIENT
Start: 2023-12-12 | End: 2023-12-12 | Stop reason: HOSPADM

## 2023-12-12 RX ORDER — MIDAZOLAM HYDROCHLORIDE 1 MG/ML
1 INJECTION INTRAMUSCULAR; INTRAVENOUS
Status: DISCONTINUED | OUTPATIENT
Start: 2023-12-12 | End: 2023-12-12 | Stop reason: HOSPADM

## 2023-12-12 RX ORDER — HYDROMORPHONE HYDROCHLORIDE 1 MG/ML
0.2 INJECTION, SOLUTION INTRAMUSCULAR; INTRAVENOUS; SUBCUTANEOUS
Status: DISCONTINUED | OUTPATIENT
Start: 2023-12-12 | End: 2023-12-12 | Stop reason: HOSPADM

## 2023-12-12 RX ORDER — CEFAZOLIN SODIUM 1 G/3ML
INJECTION, POWDER, FOR SOLUTION INTRAMUSCULAR; INTRAVENOUS PRN
Status: DISCONTINUED | OUTPATIENT
Start: 2023-12-12 | End: 2023-12-12 | Stop reason: SURG

## 2023-12-12 RX ORDER — ONDANSETRON 2 MG/ML
4 INJECTION INTRAMUSCULAR; INTRAVENOUS
Status: DISCONTINUED | OUTPATIENT
Start: 2023-12-12 | End: 2023-12-12 | Stop reason: HOSPADM

## 2023-12-12 RX ADMIN — FERROUS SULFATE TAB 325 MG (65 MG ELEMENTAL FE) 325 MG: 325 (65 FE) TAB at 21:56

## 2023-12-12 RX ADMIN — FENTANYL CITRATE 100 MCG: 50 INJECTION, SOLUTION INTRAMUSCULAR; INTRAVENOUS at 19:29

## 2023-12-12 RX ADMIN — LOSARTAN POTASSIUM 50 MG: 50 TABLET, FILM COATED ORAL at 21:59

## 2023-12-12 RX ADMIN — DEXAMETHASONE SODIUM PHOSPHATE 4 MG: 4 INJECTION INTRA-ARTICULAR; INTRALESIONAL; INTRAMUSCULAR; INTRAVENOUS; SOFT TISSUE at 20:07

## 2023-12-12 RX ADMIN — MEROPENEM 500 MG: 500 INJECTION, POWDER, FOR SOLUTION INTRAVENOUS at 00:00

## 2023-12-12 RX ADMIN — SODIUM CHLORIDE, POTASSIUM CHLORIDE, SODIUM LACTATE AND CALCIUM CHLORIDE: 600; 310; 30; 20 INJECTION, SOLUTION INTRAVENOUS at 19:00

## 2023-12-12 RX ADMIN — LIDOCAINE HYDROCHLORIDE 60 MG: 20 INJECTION, SOLUTION EPIDURAL; INFILTRATION; INTRACAUDAL at 19:33

## 2023-12-12 RX ADMIN — CEFAZOLIN 3 G: 1 INJECTION, POWDER, FOR SOLUTION INTRAMUSCULAR; INTRAVENOUS at 19:44

## 2023-12-12 RX ADMIN — ROCURONIUM BROMIDE 50 MG: 50 INJECTION, SOLUTION INTRAVENOUS at 19:33

## 2023-12-12 RX ADMIN — MEROPENEM 500 MG: 500 INJECTION, POWDER, FOR SOLUTION INTRAVENOUS at 12:16

## 2023-12-12 RX ADMIN — DOCUSATE SODIUM 50 MG AND SENNOSIDES 8.6 MG 2 TABLET: 8.6; 5 TABLET, FILM COATED ORAL at 05:21

## 2023-12-12 RX ADMIN — PROPOFOL 180 MG: 10 INJECTION, EMULSION INTRAVENOUS at 19:33

## 2023-12-12 RX ADMIN — MEROPENEM 500 MG: 500 INJECTION, POWDER, FOR SOLUTION INTRAVENOUS at 21:59

## 2023-12-12 RX ADMIN — SODIUM CHLORIDE: 9 INJECTION, SOLUTION INTRAVENOUS at 01:21

## 2023-12-12 RX ADMIN — SODIUM CHLORIDE: 9 INJECTION, SOLUTION INTRAVENOUS at 21:32

## 2023-12-12 RX ADMIN — BACLOFEN 20 MG: 10 TABLET ORAL at 12:16

## 2023-12-12 RX ADMIN — Medication 10 MG: at 01:52

## 2023-12-12 RX ADMIN — MEROPENEM 500 MG: 500 INJECTION, POWDER, FOR SOLUTION INTRAVENOUS at 05:21

## 2023-12-12 RX ADMIN — BACLOFEN 20 MG: 10 TABLET ORAL at 08:47

## 2023-12-12 RX ADMIN — ONDANSETRON 4 MG: 2 INJECTION INTRAMUSCULAR; INTRAVENOUS at 20:23

## 2023-12-12 RX ADMIN — VANCOMYCIN HYDROCHLORIDE 2500 MG: 5 INJECTION, POWDER, LYOPHILIZED, FOR SOLUTION INTRAVENOUS at 01:23

## 2023-12-12 RX ADMIN — BACLOFEN 20 MG: 10 TABLET ORAL at 21:56

## 2023-12-12 RX ADMIN — VANCOMYCIN HYDROCHLORIDE 1250 MG: 5 INJECTION, POWDER, LYOPHILIZED, FOR SOLUTION INTRAVENOUS at 13:23

## 2023-12-12 RX ADMIN — Medication 400 MG: at 05:21

## 2023-12-12 RX ADMIN — DOCUSATE SODIUM 50 MG AND SENNOSIDES 8.6 MG 2 TABLET: 8.6; 5 TABLET, FILM COATED ORAL at 21:55

## 2023-12-12 RX ADMIN — FERROUS SULFATE TAB 325 MG (65 MG ELEMENTAL FE) 325 MG: 325 (65 FE) TAB at 05:22

## 2023-12-12 ASSESSMENT — ENCOUNTER SYMPTOMS
SENSORY CHANGE: 0
SPUTUM PRODUCTION: 0
VOMITING: 0
INSOMNIA: 0
DEPRESSION: 0
CONSTIPATION: 0
STRIDOR: 0
WEAKNESS: 1
LOSS OF CONSCIOUSNESS: 0
BLURRED VISION: 0
PALPITATIONS: 0
CHILLS: 0
CLAUDICATION: 0
COUGH: 0
DIZZINESS: 0
NERVOUS/ANXIOUS: 0
MYALGIAS: 0
TINGLING: 0
DIARRHEA: 0
NAUSEA: 0
HEARTBURN: 0
FALLS: 0
ABDOMINAL PAIN: 0
HEADACHES: 0
SPEECH CHANGE: 0
SHORTNESS OF BREATH: 0
PHOTOPHOBIA: 0
FEVER: 1
FEVER: 0
WEAKNESS: 0

## 2023-12-12 ASSESSMENT — PATIENT HEALTH QUESTIONNAIRE - PHQ9
SUM OF ALL RESPONSES TO PHQ9 QUESTIONS 1 AND 2: 0
2. FEELING DOWN, DEPRESSED, IRRITABLE, OR HOPELESS: NOT AT ALL
2. FEELING DOWN, DEPRESSED, IRRITABLE, OR HOPELESS: NOT AT ALL
1. LITTLE INTEREST OR PLEASURE IN DOING THINGS: NOT AT ALL
1. LITTLE INTEREST OR PLEASURE IN DOING THINGS: NOT AT ALL
SUM OF ALL RESPONSES TO PHQ9 QUESTIONS 1 AND 2: 0
SUM OF ALL RESPONSES TO PHQ9 QUESTIONS 1 AND 2: 0
1. LITTLE INTEREST OR PLEASURE IN DOING THINGS: NOT AT ALL
2. FEELING DOWN, DEPRESSED, IRRITABLE, OR HOPELESS: NOT AT ALL

## 2023-12-12 ASSESSMENT — COGNITIVE AND FUNCTIONAL STATUS - GENERAL
DAILY ACTIVITIY SCORE: 6
TURNING FROM BACK TO SIDE WHILE IN FLAT BAD: UNABLE
SUGGESTED CMS G CODE MODIFIER DAILY ACTIVITY: CN
MOBILITY SCORE: 6
HELP NEEDED FOR BATHING: TOTAL
CLIMB 3 TO 5 STEPS WITH RAILING: TOTAL
PERSONAL GROOMING: TOTAL
DRESSING REGULAR UPPER BODY CLOTHING: TOTAL
MOVING FROM LYING ON BACK TO SITTING ON SIDE OF FLAT BED: UNABLE
WALKING IN HOSPITAL ROOM: TOTAL
STANDING UP FROM CHAIR USING ARMS: TOTAL
SUGGESTED CMS G CODE MODIFIER MOBILITY: CN
EATING MEALS: TOTAL
TOILETING: TOTAL
MOVING TO AND FROM BED TO CHAIR: UNABLE
DRESSING REGULAR LOWER BODY CLOTHING: TOTAL

## 2023-12-12 ASSESSMENT — LIFESTYLE VARIABLES
CONSUMPTION TOTAL: NEGATIVE
EVER FELT BAD OR GUILTY ABOUT YOUR DRINKING: NO
TOTAL SCORE: 0
AVERAGE NUMBER OF DAYS PER WEEK YOU HAVE A DRINK CONTAINING ALCOHOL: 0
HOW MANY TIMES IN THE PAST YEAR HAVE YOU HAD 5 OR MORE DRINKS IN A DAY: 0
ON A TYPICAL DAY WHEN YOU DRINK ALCOHOL HOW MANY DRINKS DO YOU HAVE: 0
TOTAL SCORE: 0
TOTAL SCORE: 0
HAVE YOU EVER FELT YOU SHOULD CUT DOWN ON YOUR DRINKING: NO
ALCOHOL_USE: NO
HAVE PEOPLE ANNOYED YOU BY CRITICIZING YOUR DRINKING: NO
EVER HAD A DRINK FIRST THING IN THE MORNING TO STEADY YOUR NERVES TO GET RID OF A HANGOVER: NO

## 2023-12-12 ASSESSMENT — PAIN DESCRIPTION - PAIN TYPE
TYPE: SURGICAL PAIN
TYPE: ACUTE PAIN
TYPE: SURGICAL PAIN
TYPE: ACUTE PAIN
TYPE: SURGICAL PAIN
TYPE: ACUTE PAIN
TYPE: ACUTE PAIN

## 2023-12-12 ASSESSMENT — PAIN SCALES - GENERAL: PAIN_LEVEL: 0

## 2023-12-12 ASSESSMENT — FIBROSIS 4 INDEX: FIB4 SCORE: 1.82

## 2023-12-12 NOTE — PROGRESS NOTES
BSSR received from night RN. Pt is resting comfortably in bed not in any distress on RA at 95%. AAOx4. Colostomy bag in place. Suprapubic cath in place draining yellow urine, no clots noted. Pt Denies any pain or N/V.  Discussed POC. Fall risk precautions in place, locked bed in lowest position, bed alarm on and call light within reach. All needs met at this time. Hourly rounding in place.      1810: CHG bath done. Pt transported to preop via bed in a stable condition.

## 2023-12-12 NOTE — ASSESSMENT & PLAN NOTE
Patient currently in sinus  Status post Watchman procedure  No evidence of atrial fibrillation, DC telemetry

## 2023-12-12 NOTE — PROGRESS NOTES
0040 - Patient arrived on floor from ED, A&Ox4, on RA, reviewed orders, and medication requisition and admission profile done. Oriented Patient to the floor. Plan of care discussed with Patient and answered questions asked. Safety precautions in place and hourly rounding established with CNA.

## 2023-12-12 NOTE — DISCHARGE PLANNING
Case Management Discharge Planning    Admission Date: 12/11/2023  GMLOS: 6.4  ALOS: 0    6-Clicks ADL Score: 6  6-Clicks Mobility Score: 6  PT and/or OT Eval ordered: No  Post-acute Referrals Ordered: No  Post-acute Choice Obtained: Yes  Has referral(s) been sent to post-acute provider:  No      Anticipated Discharge Dispo: Discharge Disposition: D/T to home under HHA care in anticipation of covered skilled care (06)    DME Needed: No    Action(s) Taken:     Spoke to pt at bedside. Pt confirmed he lives at [facility] and he would like to return there upon DC. Prior to hospitalization, pt was on service with [facility] - will need  order for resumption of services. Pt has daughter Geovnay who lives locally. Preferred Pharmacy is [facility].    Escalations Completed: None    Medically Clear: No    Next Steps:  f/u with medical team to discuss DC needs and barriers    Barriers to Discharge: Medical clearance      Care Transition Team Assessment    Information Source  Orientation Level: Oriented X4  Information Given By: Patient  Informant's Name: Osvaldo TejasAnjum)  Who is responsible for making decisions for patient? : Patient    Readmission Evaluation  Is this a readmission?: No    Elopement Risk  Legal Hold: No  Ambulatory or Self Mobile in Wheelchair: No-Not an Elopement Risk  Elopement Risk: Not at Risk for Elopement    Interdisciplinary Discharge Planning  Lives with - Patient's Self Care Capacity: Unrelated Adult  Support Systems: Family Member(s)  Housing / Facility: [address] [place] [address]  Durable Medical Equipment: Not Applicable    Discharge Preparedness  What is your plan after discharge?: Home with help (Group home)  What are your discharge supports?: Child, Other (comment) (group home staff)    Finances  Financial Barriers to Discharge: No    Vision / Hearing Impairment  Vision Impairment : Yes  Right Eye Vision: Impaired, Wears Glasses  Left Eye Vision: Impaired, Wears Glasses  Hearing Impairment :  No    Advance Directive  Advance Directive?: POLST    Domestic Abuse  Have you ever been the victim of abuse or violence?: No  Physical Abuse or Sexual Abuse: No  Verbal Abuse or Emotional Abuse: No  Possible Abuse/Neglect Reported to:: Not Applicable    Psychological Assessment  History of Substance Abuse: None  History of Psychiatric Problems: No  Non-compliant with Treatment: No  Newly Diagnosed Illness: No    Discharge Risks or Barriers  Discharge risks or barriers?: Complex medical needs  Patient risk factors: Complex medical needs, Cognitive / sensory / physical deficit, Vulnerable adult    Anticipated Discharge Information  Discharge Disposition: D/T to home under A care in anticipation of covered skilled care (06)

## 2023-12-12 NOTE — ED NOTES
ERP at bedside. Pt agrees with plan of care discussed by ERP. AIDET acknowledged with patient. Noemi in low position, side rail up for pt safety. Call light within reach. Will continue to monitor.

## 2023-12-12 NOTE — WOUND TEAM
[facility]  Inpatient Services  Initial Wound and Skin Care Evaluation    Admission Date: 12/11/2023     Last order of IP CONSULT TO WOUND CARE was found on 12/12/2023 from Hospital Encounter on 12/11/2023     HPI, PMH, SH: Reviewed    Past Surgical History:   Procedure Laterality Date    IRRIGATION & DEBRIDEMENT GENERAL Left 04/26/2022    Procedure: IRRIGATION AND DEBRIDEMENT, WOUND - LEG;  Surgeon: Eric Raman M.D.;  Location: [facility];  Service: Orthopedics    WOUND CLOSURE NEURO Left 04/26/2022    Procedure: CLOSURE, WOUND;  Surgeon: Eric Raman M.D.;  Location: [facility];  Service: Orthopedics    ORTHOPEDIC OSTEOTOMY Left 04/26/2022    Procedure: OSTECTOMY;  Surgeon: Eric Raman M.D.;  Location: [facility];  Service: Orthopedics    INCISION AND DRAINAGE ORTHOPEDIC Left 01/27/2022    Procedure: INCISION AND DRAINAGE, WOUND, BY ORTHOPEDICS;  Surgeon: Erasmo Stewart M.D.;  Location: [facility];  Service: Orthopedics    BONE BIOPSY Left 01/27/2022    Procedure: BIOPSY, BONE;  Surgeon: Erasmo Stewart M.D.;  Location: [facility];  Service: Orthopedics    INCISION AND DRAINAGE ORTHOPEDIC  01/22/2022    Procedure: INCISION AND DRAINAGE, WOUND, BY ORTHOPEDICS;  Surgeon: Erasmo Stewart M.D.;  Location: [facility];  Service: Orthopedics    FL CYSTOSCOPY,INSERT URETERAL STENT Left 01/04/2022    Procedure: CYSTOSCOPY, WITH URETERAL STENT INSERTION;  Surgeon: Osvaldo Baltazar M.D.;  Location: [facility];  Service: Urology    FL CYSTO/URETERO/PYELOSCOPY, DX Left 01/04/2022    Procedure: URETEROSCOPY;  Surgeon: Osvaldo Baltazar M.D.;  Location: [facility];  Service: Urology    LASERTRIPSY N/A 01/04/2022    Procedure: LITHOTRIPSY, USING LASER;  Surgeon: Osvaldo Baltazar M.D.;  Location: [facility];  Service: Urology    FL CYSTOSCOPY,INSERT URETERAL STENT Left 12/16/2021    Procedure:  CYSTOSCOPY, WITH URETERAL STENT INSERTION;  Surgeon: Aly Bowen M.D.;  Location: [facility];  Service: Urology    NE CYSTO/URETERO/PYELOSCOPY, DX Left 2021    Procedure: URETEROSCOPY;  Surgeon: Aly Bowen M.D.;  Location: [facility];  Service: Urology    LASERTRIPSY Left 2021    Procedure: LITHOTRIPSY, USING LASER;  Surgeon: Aly Bowen M.D.;  Location: [facility];  Service: Urology    IRRIGATION & DEBRIDEMENT GENERAL  2020    Procedure: IRRIGATION AND DEBRIDEMENT, WOUND SACRAL ULCER;  Surgeon: Matt Cummins M.D.;  Location: [facility];  Service: Plastics    ULCER DEBRIDEMENT N/A 2019    Procedure: debridement of Sacral grade 4 ulcer - W/BONE BIOPSY, 3 liter wash out. bilateral sliding gluteal myocutaneous flap advancement;  Surgeon: Amadeo Moon M.D.;  Location: [facility];  Service: Plastics    FLAP CLOSURE  2019    Procedure: CLOSURE, FLAP - MUSCLE;  Surgeon: Amadeo Moon M.D.;  Location: [facility];  Service: Plastics    COLOSTOMY N/A 2019    Procedure: CREATION, COLOSTOMY -  placement;  Surgeon: Elías Hannah M.D.;  Location: [facility];  Service: General    COLOSTOMY      COLOSTOMY TAKEDOWN      HERNIA REPAIR      ORIF, ANKLE      PERCUTANEOUOSPINNING LOWER EXTREMITY       Social History     Tobacco Use    Smoking status: Former     Current packs/day: 0.00     Average packs/day: 1 pack/day for 10.0 years (10.0 ttl pk-yrs)     Types: Cigarettes     Start date: 1967     Quit date: 1977     Years since quittin.9    Smokeless tobacco: Never   Substance Use Topics    Alcohol use: Yes     Alcohol/week: 4.2 oz     Types: 7 Standard drinks or equivalent per week     Comment: 2/day     Chief Complaint   Patient presents with    Fever     101.8 this afternoon       Diagnosis: Osteomyelitis (HCC) [M86.9]    Unit where seen by Wound Team:       WOUND CONSULT RELATED TO:  coccyx, R IT, L 2nd toe, LLE    WOUND TEAM PLAN OF CARE - Frequency of Follow-up:   Nursing to follow dressing orders written for wound care. Contact wound team if area fails to progress, deteriorates or with any questions/concerns if something comes up before next scheduled follow up (See below as to whether wound is following and frequency of wound follow up)   coccyx, R IT, L 2nd toe, LLE    WOUND HISTORY:   Pt is being seen at [facility] wound clinic for Coccyx, R IT, LLE and L 2nd toe as well as HH.        WOUND ASSESSMENT/LDA  Wound 06/18/23 Pressure Injury Leg Posterior Left resolved 9/1/23, re-opened 11/17/23 (Active)   Date First Assessed/Time First Assessed: 06/18/23 1400   Present on Original Admission: Yes  Hand Hygiene Completed: Yes  Primary Wound Type: Pressure Injury  Location: Leg  Wound Orientation: Posterior  Laterality: Left  Wound Description (Comments):...      Assessments 12/12/2023 12:00 PM     posterior     medial   Site Assessment Red;Light Purple   Periwound Assessment Intact   Margins Defined edges   Closure Secondary intention   Drainage Amount Scant   Drainage Description Serosanguineous   Treatments Cleansed;Nonselective debridement   Wound Cleansing Normal Saline Irrigation   Periwound Protectant Barrier Paste   Dressing Status Intact   Dressing Changed Changed   Dressing Cleansing/Solutions Not Applicable   Dressing Options Hydrofiber Silver;Silicone Adhesive Foam   Dressing Change/Treatment Frequency Every 48 hrs, and As Needed   NEXT Dressing Change/Treatment Date 12/14/23   NEXT Weekly Photo (Inpatient Only) 12/19/23   Wound Team Following Weekly   Non-staged Wound Description Partial thickness   Wound Length (cm) 1.5 cm   Wound Width (cm) 1.5 cm   Wound Depth (cm) 0.2 cm   Wound Surface Area (cm^2) 2.25 cm^2   Wound Volume (cm^3) 0.45 cm^3   Wound Healing % 55   Shape circular   Wound Odor None   Exposed Structures None       Wound 09/13/23 Pressure  Injury Coccyx Superior (Active)   Date First Assessed/Time First Assessed: 09/13/23 1530   Primary Wound Type: Pressure Injury  Location: Coccyx  Wound Orientation: Superior      Assessments 12/12/2023 12:00 PM         inferior              superior     Site Assessment Red   Periwound Assessment Scar tissue   Margins Defined edges   Closure Secondary intention   Drainage Amount Scant   Drainage Description Serosanguineous   Treatments Cleansed;Nonselective debridement   Wound Cleansing Normal Saline Irrigation   Periwound Protectant No-sting Skin Prep   Dressing Status Intact   Dressing Changed Changed   Dressing Cleansing/Solutions Not Applicable   Dressing Options Hydrofiber Silver;Offloading Dressing - Sacral   Dressing Change/Treatment Frequency Every 48 hrs, and As Needed   NEXT Dressing Change/Treatment Date 12/14/23   NEXT Weekly Photo (Inpatient Only) 12/19/23   Wound Team Following Weekly   Pressure Injury Stage Stage 4   Wound Length (cm) 4 cm   Wound Width (cm) 0.7 cm   Wound Depth (cm) 0.4 cm   Wound Surface Area (cm^2) 2.8 cm^2   Wound Volume (cm^3) 1.12 cm^3   Wound Healing % -107   Shape linear   Wound Odor None   Exposed Structures None       Wound 11/29/23 Full Thickness Wound Toe, 2nd Dorsal Left (Active)   Date First Assessed/Time First Assessed: 11/29/23 1400   Hand Hygiene Completed: Yes  Primary Wound Type: Full Thickness Wound  Location: Toe, 2nd  Wound Orientation: Dorsal  Laterality: Left      Assessments 12/12/2023 12:00 PM   Wound Image     Site Assessment Red;Light Purple   Periwound Assessment Intact   Margins Defined edges   Closure Secondary intention   Drainage Amount None   Treatments Cleansed;Nonselective debridement   Wound Cleansing Normal Saline Irrigation   Dressing Status Intact   Dressing Changed Changed   Dressing Cleansing/Solutions Not Applicable   Dressing Options Hydrofiber Silver Strip;Hypafix Tape   Dressing Change/Treatment Frequency Every 48 hrs, and As Needed   NEXT  Dressing Change/Treatment Date 12/14/23   NEXT Weekly Photo (Inpatient Only) 12/19/23   Wound Team Following Weekly   Wound Length (cm) 0.5 cm   Wound Width (cm) 1 cm   Wound Depth (cm) 0.1 cm   Wound Surface Area (cm^2) 0.5 cm^2   Wound Volume (cm^3) 0.05 cm^3   Wound Healing % -456   Shape oval   Exposed Structures None       Wound 12/12/23 Pressure Injury Buttocks Right (Active)   Date First Assessed/Time First Assessed: 12/12/23 0550   Present on Original Admission: Yes  Primary Wound Type: Pressure Injury  Location: Buttocks  Laterality: Right      Assessments 12/12/2023 12:00 PM         Site Assessment Tan;Yellow;Red;Drainage   Periwound Assessment Red   Margins Defined edges   Closure None   Drainage Amount Small   Drainage Description Purulent;Serosanguineous   Treatments Cleansed   Wound Cleansing Normal Saline Irrigation   Periwound Protectant No-sting Skin Prep   Dressing Status Intact   Dressing Changed Changed   Dressing Cleansing/Solutions Not Applicable   Dressing Options Moist Roll Gauze;Offloading Dressing - Sacral        Vascular:    JUAN MANUEL:   No results found.    Lab Values:    Lab Results   Component Value Date/Time    WBC 12.0 (H) 12/11/2023 07:24 PM    RBC 3.97 (L) 12/11/2023 07:24 PM    HEMOGLOBIN 13.6 (L) 12/11/2023 07:24 PM    HEMATOCRIT 40.2 (L) 12/11/2023 07:24 PM    CREACTPROT 0.82 (H) 04/01/2022 02:26 PM    SEDRATEWES 23 (H) 04/01/2022 02:26 PM         Culture Results show:  No results found for this or any previous visit (from the past 720 hour(s)).    Pain Level/Medicated:  Patient denies pain       INTERVENTIONS BY WOUND TEAM:  Chart and images reviewed. Discussed with bedside RN. All areas of concern (based on picture review, LDA review and discussion with bedside RN) have been thoroughly assessed. Documentation of areas based on significant findings. This RN in to assess patient. Performed standard wound care which includes appropriate positioning, dressing removal and non-selective  debridement. Pictures and measurements obtained weekly if/when required.    Wound:  coccyx  Preparation for Dressing removal: Removed without difficulty  Cleansed/Non-selectively Debrided with:  Normal Saline and Gauze  Raeann wound: Cleansed with Normal Saline and Gauze, Prepped with No Sting  Primary Dressing:  silver hydrofiber  Secondary (Outer) Dressing: sacral offloading drsg     Wound:  R IT  Preparation for Dressing removal: Removed without difficulty  Cleansed/Non-selectively Debrided with:  Normal Saline and Gauze  Raeann wound: Cleansed with Normal Saline and Gauze, Prepped with No Sting  Primary Dressing:  wet to dry drsg  Secondary (Outer) Dressing: sacral offloading drsg      Wound:  LLE  Preparation for Dressing removal: Removed without difficulty  Cleansed/Non-selectively Debrided with:  Normal Saline and Gauze  Raeann wound: Cleansed with Normal Saline and Gauze, Prepped with Barrier paste  Primary Dressing:  silver hydrofiber  Secondary (Outer) Dressing: adhesive foam    Wound:  L 2nd toe  Preparation for Dressing removal: Removed without difficulty  Cleansed/Non-selectively Debrided with:  Normal Saline and Gauze  Primary Dressing:  silver hydrofiber, hypafix tape     Advanced Wound Care Discharge Planning  Number of Clinicians necessary to complete wound care: 2 need help elevating leg  Is patient requiring IV pain medications for dressing changes:  No   Length of time for dressing change 60 min. (This does not include chart review, pre-medication time, set up, clean up or time spent charting.)    Interdisciplinary consultation: Patient, Bedside RN ([facility])  EVALUATION / RATIONALE FOR TREATMENT:     Date:  12/12/23  Wound Status:  Initial evaluation    Per Dr Bansal note pt to go to [facility] for debridement of R IT wound, so assessed wound and placed wet-dry drsg. Per pt possibility of VAC placement.  Coccyx wounds are small with scar tissue present. LLE shallow with scant drainage. Aquacel  Hydrofiber  applied to manage bioburden, absorb exudate, and maintain a moist wound environment without laterally wicking exudate therefore reducing shira-wound maceration.    Nsg had changed colostomy pouching system yesterday and it is intact. Supplies at bedside. Stoma red with brown soft output.            Goals: Slow steady decrease in wound area and depth weekly.    NURSING PLAN OF CARE ORDERS:  Dressing changes: See Dressing Care orders  RN Prevention Protocol    NUTRITION RECOMMENDATIONS   Wound Team Recommendations:  N/A    DIET ORDERS (From admission to next 24h)       Start     Ordered    12/12/23 0005  Diet NPO Restrict to: Sips with Medications  ALL MEALS        Question:  Diet NPO Restrict to:  Answer:  Sips with Medications    12/12/23 0005                    PREVENTATIVE INTERVENTIONS:    Q shift Luis Enrique - performed per nursing policy  Q shift pressure point assessments - performed per nursing policy    Surface/Positioning  Standard/trauma mattress - Currently in Place  Reposition q 2 hours - Currently in Place  TAPs Turning system - Currently in Place  Waffle overlay  - Currently in Place    Offloading/Redistribution  Sacral offloading dressing (Silicone dressing) - Currently in Place  Heel offloading dressing (Silicone dressing) - Currently in Place  Heel float boots (Prevalon boot) - Currently in Place      Containment/Moisture Prevention    Fecal ostomy - Currently in Place  Suprapubic - Currently in Place    Anticipated discharge plans:  Home Health Care and Outpatient Wound Center        Vac Discharge Needs:  Vac Discharge plan is purely a recommendation from wound team and not a requirement for discharge unless otherwise stated by physician.  Not Applicable Pt not on a wound vac

## 2023-12-12 NOTE — ASSESSMENT & PLAN NOTE
12/16: It does not seem patient meets SIRS criteria today.  Continue antibiotics as per IDs recommendation.

## 2023-12-12 NOTE — PROGRESS NOTES
Pharmacy Vancomycin Kinetics Note for 12/12/2023     73 y.o. male on Vancomycin day # 1     Vancomycin Indication (AUC Dosing): Skin/skin structure infection    Provider specified end date: 12/19/23    Active Antibiotics (From admission, onward)      Ordered     Ordering Provider       Tue Dec 12, 2023  1:34 AM    12/12/23 0134  vancomycin 1250 mg/250mL NS IVPB premix  (vancomycin (VANCOCIN) IV (LD + Maintenance))  EVERY 12 HOURS        Note to Pharmacy: Per P&T vanco per pharmacy Protocol    Dario Almonte D.O.       Tue Dec 12, 2023 12:10 AM    12/12/23 0010  MD Alert...Vancomycin per Pharmacy  PRN        Question:  Indication(s) for vancomycin?  Answer:  Skin and soft tissue infection    Dario Almonte D.O.    12/12/23 0010  meropenem (Merrem) 500 mg in  mL IV-MBP  EVERY 6 HOURS        Question:  Indication  Answer:  Skin and soft tissue infection    Dario Almonte D.O.       Mon Dec 11, 2023 11:02 PM    12/11/23 2302  vancomycin 2500 mg/500mL NS IVPB premix  (vancomycin (VANCOCIN) IV (LD + Maintenance))  ONCE        Note to Pharmacy: Per P&T vanco per pharmacy Protocol    Felix Newell M.D.            Dosing Weight: 102 kg (224 lb 13.9 oz)      Admission History: Admitted on 12/11/2023 for Osteomyelitis (HCC) [M86.9]  Pertinent history: Patient with chronic sacral ulcer presenting with a fever and elevated WBC of 12 with concern for worsening infection and imaging now showing possible osteo. HIstory of ESBL and MRSA. Empiric vanco and meropenem therapy initiated.    Allergies:     Sulfa drugs     Pertinent cultures to date:     Results       Procedure Component Value Units Date/Time    CULTURE WOUND W/ GRAM STAIN [[id number]]     Order Status: Sent Specimen: Wound from Perianal     CoV-2, FLU A/B, and RSV by PCR (2-4 Hours CEPHEID) : Collect NP swab in VTM [[id number]] Collected: 12/11/23 1938    Order Status: Completed Specimen: Respirate Updated: 12/11/23 2101     Influenza virus A RNA  "Negative     Influenza virus B, PCR Negative     RSV, PCR Negative     SARS-CoV-2 by PCR NotDetected     Comment: [facility] providers: PLEASE REFER TO DE-ESCALATION AND RETESTING PROTOCOL  on [email or web address]    **The [employer] GeneXpert Xpress SARS-CoV-2 RT-PCR Test has been made  available for use under the Emergency Use Authorization (EUA) only.          SARS-CoV-2 Source NP Swab    URINALYSIS CULTURE, IF INDICATED [[id number]]  (Abnormal) Collected: 23    Order Status: Completed Specimen: Urine, Suprapubic Updated: 23     Color Yellow     Character Clear     Specific Gravity 1.025     Ph 6.0     Glucose Negative mg/dL      Ketones 40 mg/dL      Protein 100 mg/dL      Bilirubin Negative     Nitrite Negative     Leukocyte Esterase Negative     Occult Blood Small     Micro Urine Req Microscopic    Narrative:      Indication for culture:->Evaluation for sepsis without a  clear source of infection    BLOOD CULTURE [[id number]] Collected: 23    Order Status: Sent Specimen: Blood from Peripheral Updated: 23    Narrative:      Per Hospital Policy: Only change Specimen Src: to \"Line\" if  specified by physician order.    BLOOD CULTURE [[id number]] Collected: 23    Order Status: Sent Specimen: Blood from Peripheral Updated: 23    Narrative:      Per Hospital Policy: Only change Specimen Src: to \"Line\" if  specified by physician order.            Labs:     Estimated Creatinine Clearance: 103.6 mL/min (by C-G formula based on SCr of 0.76 mg/dL).  Recent Labs     23   WBC 12.0*   NEUTSPOLYS 81.80*     Recent Labs     23   BUN 17   CREATININE 0.76   ALBUMIN 3.8     No intake or output data in the 24 hours ending 23 0134   /67   Pulse 76   Temp 37.2 °C (98.9 °F) (Temporal)   Resp 17   Ht 1.778 m (5' 10\")   Wt 102 kg (225 lb)   SpO2 93%  Temp (24hrs), Av.4 °C (99.4 °F), Min:36.6 °C (97.8 °F), Max:38.2 °C (100.8 °F)      List " concerns for Vancomycin clearance:     Age;Receipt of contrast dye;Obesity    Pharmacokinetics:     AUC kinetics:   Ke (hr ^-1): 0.0899 hr^-1  Half life: 7.71 hr  Clearance: 4.809  Estimated TDD: 2404.5  Estimated Dose: 972  Estimated interval: 9.7    A/P:     -  Vancomycin dose: 2500mg Loading dose followed by 1250mg Q12h    -  Next vancomycin level(s):    - None ordered at this time. Likely obtain levels after the 4th maintenance dose unless clinical status or renal function changes.     -  Predicted vancomycin AUC from initial AUC test calculator: 520 mg·hr/L    -  Comments: Concerns for accumulation listed above. MRSA nares swab ordered. Pharmacy will continue to follow and adjust therapy as clinically appropriate.    Markie Lazaro, AdolfoD

## 2023-12-12 NOTE — ASSESSMENT & PLAN NOTE
Patient did have a CT abdomen/pelvis, noted right ischial decubitus ulcer extending to bone with soft tissue gas tracking along the right perineum, appearance suggesting osteomyelitis  Symptoms started 3 weeks ago, now with fever, do not feel there is necrotizing fasciitis, this was discussed with ERP who also discussed with surgery  General surgery debrided sacral wound, wound VAC to be placed today, wound care consult placed again  Appreciate infectious disease consult  Currently recommending continuation of Merrem and vancomycin    12/16: Antibiotics as per ID recommendations    12/17: Orthopedic surgery evaluated the patient today and did not recommend surgical intervention at this time.  ID following the patient.  We have ordered PICC line placement.  The patient will also require placement upon discharge.  We appreciate further recommendations by case management.    12/18: Pending PICC line and placement.    12/20: PICC line placed and pending placement.    12/22: Continue antibiotics as per ID, pending placement.    12/23:  Continue antibiotics, PAMS pending bed availability.

## 2023-12-12 NOTE — ED PROVIDER NOTES
"  ER Provider Note    Scribed for Felix Newell M.D. by Heron Vale. 12/11/2023  6:13 PM    Primary Care Provider: KATE Pretty    CHIEF COMPLAINT  Chief Complaint   Patient presents with    Fever     101.8 this afternoon       EXTERNAL RECORDS REVIEWED  Outpatient Notes Patient has multiple sacral decubitus ulcers and ulcers to his left second toe and heel. Patient was seen by wound care 12/6. Patient has a history of multidrug-resistant urinary tract infection.     HPI/ROS  LIMITATION TO HISTORY   Select: : None  OUTSIDE HISTORIAN(S):  None.    Osvaldo Pate is a 73 y.o. male who presents to the ED for evaluation of fever onset 3PM. The patient reports a 101.8 °F fever at 3PM, and states he took Tylenol at 3PM. The patient reports the Tylenol did not alleviate his fever, and he had a T-max of 102 °F at 5PM. The patient denies nausea, vomiting, sore throat, cough, or blood thinner use. The patient states he has been compliant with his Augmentin. The patient reports he has a colostomy and a catheter placed. The patient has a medical history of incomplete quadriplegia C5-C7. The patient has a known drug allergy to Sulfa drugs.      PAST MEDICAL HISTORY  Past Medical History:   Diagnosis Date    A-fib (Formerly [facility])     Acute cystitis without hematuria 10/23/2021    Acute UTI 6/18/2023    Arrhythmia     Atrial flutter (Formerly [facility])     Blood clotting disorder (Formerly [facility])     Patient is on Xarelto    Bowel habit changes     Colostomy    Clot hematuria 1/23/2023    GERD (gastroesophageal reflux disease)     Hypertension     Hypokalemia 6/18/2023    Kidney stones     Metabolic acidosis 6/18/2023    Neurogenic bladder     S/P cath    Open wound 11/18/2022    sacrum with wound vac, left ankle, [facility]    Quadriplegia, C5-C7 complete (Formerly [facility])     patient reports \" incomplete quad\"    Suprapubic catheter (Formerly [facility])        SURGICAL HISTORY  Past Surgical History:   Procedure Laterality Date    IRRIGATION & DEBRIDEMENT " GENERAL Left 04/26/2022    Procedure: IRRIGATION AND DEBRIDEMENT, WOUND - LEG;  Surgeon: Eric Raman M.D.;  Location: SURGERY [facility];  Service: Orthopedics    WOUND CLOSURE NEURO Left 04/26/2022    Procedure: CLOSURE, WOUND;  Surgeon: Eric Raman M.D.;  Location: [facility];  Service: Orthopedics    ORTHOPEDIC OSTEOTOMY Left 04/26/2022    Procedure: OSTECTOMY;  Surgeon: Eric Raman M.D.;  Location: [facility];  Service: Orthopedics    INCISION AND DRAINAGE ORTHOPEDIC Left 01/27/2022    Procedure: INCISION AND DRAINAGE, WOUND, BY ORTHOPEDICS;  Surgeon: Erasmo Stewart M.D.;  Location: [facility];  Service: Orthopedics    BONE BIOPSY Left 01/27/2022    Procedure: BIOPSY, BONE;  Surgeon: Erasmo Stewart M.D.;  Location: [facility];  Service: Orthopedics    INCISION AND DRAINAGE ORTHOPEDIC  01/22/2022    Procedure: INCISION AND DRAINAGE, WOUND, BY ORTHOPEDICS;  Surgeon: Erasmo Stewart M.D.;  Location: [facility];  Service: Orthopedics    ID CYSTOSCOPY,INSERT URETERAL STENT Left 01/04/2022    Procedure: CYSTOSCOPY, WITH URETERAL STENT INSERTION;  Surgeon: Osvaldo Baltazar M.D.;  Location: [facility];  Service: Urology    ID CYSTO/URETERO/PYELOSCOPY, DX Left 01/04/2022    Procedure: URETEROSCOPY;  Surgeon: Osvaldo Baltazar M.D.;  Location: [facility];  Service: Urology    LASERTRIPSY N/A 01/04/2022    Procedure: LITHOTRIPSY, USING LASER;  Surgeon: Osvaldo Baltazar M.D.;  Location: [facility];  Service: Urology    ID CYSTOSCOPY,INSERT URETERAL STENT Left 12/16/2021    Procedure: CYSTOSCOPY, WITH URETERAL STENT INSERTION;  Surgeon: Aly Bowen M.D.;  Location: [facility];  Service: Urology    ID CYSTO/URETERO/PYELOSCOPY, DX Left 12/16/2021    Procedure: URETEROSCOPY;  Surgeon: Aly Bowen M.D.;  Location: SURGERY [facility];  Service: Urology    LASERTRIPSY Left 12/16/2021    Procedure:  LITHOTRIPSY, USING LASER;  Surgeon: Aly Bowen M.D.;  Location: [facility];  Service: Urology    IRRIGATION & DEBRIDEMENT GENERAL  12/20/2020    Procedure: IRRIGATION AND DEBRIDEMENT, WOUND SACRAL ULCER;  Surgeon: Matt Cummins M.D.;  Location: [facility];  Service: Plastics    ULCER DEBRIDEMENT N/A 08/21/2019    Procedure: debridement of Sacral grade 4 ulcer - W/BONE BIOPSY, 3 liter wash out. bilateral sliding gluteal myocutaneous flap advancement;  Surgeon: Amadeo Moon M.D.;  Location: [facility];  Service: Plastics    FLAP CLOSURE  08/21/2019    Procedure: CLOSURE, FLAP - MUSCLE;  Surgeon: Amadeo Moon M.D.;  Location: [facility];  Service: Plastics    COLOSTOMY N/A 07/27/2019    Procedure: CREATION, COLOSTOMY -  placement;  Surgeon: Elías Hannah M.D.;  Location: [facility];  Service: General    COLOSTOMY      COLOSTOMY TAKEDOWN      HERNIA REPAIR      ORIF, ANKLE      PERCUTANEOUOSPINNING LOWER EXTREMITY         FAMILY HISTORY  Family History   Problem Relation Age of Onset    Heart Disease Father        SOCIAL HISTORY   reports that he quit smoking about 46 years ago. His smoking use included cigarettes. He started smoking about 56 years ago. He has a 10.0 pack-year smoking history. He has never used smokeless tobacco. He reports current alcohol use of about 4.2 oz of alcohol per week. He reports that he does not use drugs.    CURRENT MEDICATIONS  Previous Medications    ACETAMINOPHEN (TYLENOL) 500 MG TAB    Take 1,000 mg by mouth every 6 hours as needed for Moderate Pain.    AMLODIPINE (NORVASC) 5 MG TAB    Take 1 Tablet by mouth every day.    AMOXICILLIN-CLAVULANATE (AUGMENTIN) 875-125 MG TAB    Take 1 Tablet by mouth 2 times a day for 10 days.    ASCORBIC ACID (VITAMIN C) 1000 MG TAB    Take 1 Tablet by mouth every morning.    ASPIRIN EC 81 MG EC TABLET    Take 1 Tablet by mouth every day.    BACLOFEN (LIORESAL) 20 MG  "TABLET    Take 1 Tablet by mouth 4 times a day.    CHOLECALCIFEROL (VITAMIN D3) 2000 UNIT CAP    Take 1 Capsule by mouth every morning.    DICLOFENAC SODIUM (VOLTAREN) 1 % GEL    Apply 1 g topically 4 times a day. Apply to both elbows    DOCUSATE SODIUM (COLACE) 100 MG CAP    Take 200 mg by mouth every day.    FERROUS SULFATE 325 (65 FE) MG TABLET    Take 1 Tablet by mouth 2 times a day.    LOSARTAN (COZAAR) 50 MG TAB    TAKE 1 TABLET BY MOUTH AT  BEDTIME. HOLD IF BP &lt;100/64.    MAGNESIUM 400 MG TAB    Take 400 mg by mouth every morning.    MELATONIN 5 MG TAB    Take 10 mg by mouth at bedtime. Gummie    METHENAMINE HIP (HIPPREX) 1 GM TAB    TAKE 1 TABLET BY MOUTH TWICE DAILY AS DIRECTED    POLYETHYLENE GLYCOL/LYTES (MIRALAX) 17 G PACK    Take 17 g by mouth every day. Indications: Constipation    PROBIOTIC PRODUCT (PROBIOTIC PO)    Take 1 Capsule by mouth every morning.    SENNOSIDES (SENOKOT) 8.6 MG TAB    Take 17.2 mg by mouth 2 times a day.    SIMETHICONE (GAS-X PO)    Take 1 Tablet by mouth 2 times a day as needed (For gas).    ZINC 50 MG TAB    Take 1 Tablet by mouth every morning.       ALLERGIES  Sulfa drugs    PHYSICAL EXAM  /58   Pulse 71   Temp 37.9 °C (100.2 °F) (Oral)   Resp 17   Ht 1.778 m (5' 10\")   Wt 102 kg (225 lb)   SpO2 92%   BMI 32.28 kg/m²   Constitutional: Well developed, Well nourished, mild distress.   HENT: Normocephalic, Atraumatic,   Eyes: Conjunctiva normal, No discharge.   Cardiovascular: Normal heart rate, Normal rhythm, No murmurs, equal pulses.   Pulmonary: Normal breath sounds, No respiratory distress, No wheezing, No rales, No rhonchi.  Chest: No chest wall tenderness or deformity.   Abdomen:Soft, No tenderness, No masses, no rebound, no guarding. Colostomy left lower abdomen.    Musculoskeletal: No major deformities noted, No tenderness.   Extremities: Right ischium has a 2.5cm x 2cm x 1cm Stage 3 decubitus ulcer. Mild surrounding erythema. No significant pus. " Chronic sacral ulcer with no surrounding erythema.             Skin: Warm, Dry, No erythema, No rash.   Neurologic: Alert & oriented x 3, Normal motor function,  No focal deficits noted.   Psychiatric: Affect normal, Judgment normal, Mood normal.       DIAGNOSTIC STUDIES    Labs:   Results for orders placed or performed during the hospital encounter of 12/11/23   CBC WITH DIFFERENTIAL   Result Value Ref Range    WBC 12.0 (H) 4.8 - 10.8 K/uL    RBC 3.97 (L) 4.70 - 6.10 M/uL    Hemoglobin 13.6 (L) 14.0 - 18.0 g/dL    Hematocrit 40.2 (L) 42.0 - 52.0 %    .3 (H) 81.4 - 97.8 fL    MCH 34.3 (H) 27.0 - 33.0 pg    MCHC 33.8 32.3 - 36.5 g/dL    RDW 48.8 35.9 - 50.0 fL    Platelet Count 152 (L) 164 - 446 K/uL    MPV 9.3 9.0 - 12.9 fL    Neutrophils-Polys 81.80 (H) 44.00 - 72.00 %    Lymphocytes 6.90 (L) 22.00 - 41.00 %    Monocytes 10.30 0.00 - 13.40 %    Eosinophils 0.20 0.00 - 6.90 %    Basophils 0.20 0.00 - 1.80 %    Immature Granulocytes 0.60 0.00 - 0.90 %    Nucleated RBC 0.00 0.00 - 0.20 /100 WBC    Neutrophils (Absolute) 9.82 (H) 1.82 - 7.42 K/uL    Lymphs (Absolute) 0.83 (L) 1.00 - 4.80 K/uL    Monos (Absolute) 1.23 (H) 0.00 - 0.85 K/uL    Eos (Absolute) 0.02 0.00 - 0.51 K/uL    Baso (Absolute) 0.02 0.00 - 0.12 K/uL    Immature Granulocytes (abs) 0.07 0.00 - 0.11 K/uL    NRBC (Absolute) 0.00 K/uL   COMP METABOLIC PANEL   Result Value Ref Range    Sodium 138 135 - 145 mmol/L    Potassium 3.8 3.6 - 5.5 mmol/L    Chloride 102 96 - 112 mmol/L    Co2 25 20 - 33 mmol/L    Anion Gap 11.0 7.0 - 16.0    Glucose 115 (H) 65 - 99 mg/dL    Bun 17 8 - 22 mg/dL    Creatinine 0.76 0.50 - 1.40 mg/dL    Calcium 8.9 8.4 - 10.2 mg/dL    Correct Calcium 9.1 8.5 - 10.5 mg/dL    AST(SGOT) 10 (L) 12 - 45 U/L    ALT(SGPT) 7 2 - 50 U/L    Alkaline Phosphatase 66 30 - 99 U/L    Total Bilirubin 0.8 0.1 - 1.5 mg/dL    Albumin 3.8 3.2 - 4.9 g/dL    Total Protein 7.5 6.0 - 8.2 g/dL    Globulin 3.7 (H) 1.9 - 3.5 g/dL    A-G Ratio 1.0 g/dL    LACTIC ACID   Result Value Ref Range    Lactic Acid 1.9 0.5 - 2.0 mmol/L   URINALYSIS CULTURE, IF INDICATED    Specimen: Urine, Suprapubic   Result Value Ref Range    Color Yellow     Character Clear     Specific Gravity 1.025 <1.035    Ph 6.0 5.0 - 8.0    Glucose Negative Negative mg/dL    Ketones 40 (A) Negative mg/dL    Protein 100 (A) Negative mg/dL    Bilirubin Negative Negative    Nitrite Negative Negative    Leukocyte Esterase Negative Negative    Occult Blood Small (A) Negative    Micro Urine Req Microscopic    CoV-2, FLU A/B, and RSV by PCR (2-4 Hours CEPHEID) : Collect NP swab in VTM    Specimen: Respirate   Result Value Ref Range    Influenza virus A RNA Negative Negative    Influenza virus B, PCR Negative Negative    RSV, PCR Negative Negative    SARS-CoV-2 by PCR NotDetected     SARS-CoV-2 Source NP Swab    ESTIMATED GFR   Result Value Ref Range    GFR (CKD-EPI) 95 >60 mL/min/1.73 m 2   URINE MICROSCOPIC (W/UA)   Result Value Ref Range    WBC 5-10 (A) /hpf    RBC 2-5 (A) /hpf    Bacteria Few (A) None /hpf    Epithelial Cells Few Few /hpf    Yeast Cells Few (A) None /hpf    Budding Yeast Present (A) Absent /hpf    Hyphae Yeast Present (A) Absent /hpf       Radiology:   The attending emergency physician has independently interpreted the diagnostic imaging associated with this visit and am waiting the final reading from the radiologist.   Preliminary interpretation is a follows: Chest x-ray does not show any pneumonia.  CT of the chest abdomen pelvis shows gas in the right medial buttocks medial to the ischial tuberosity decubitus ulcer  Radiologist interpretation:   CT-ABDOMEN-PELVIS WITH   Final Result         1.  Right ischial decubitus ulcer extending to bone with soft tissue gas tracking along the right perineum. Appearance suggesting osteomyelitis, consider component of necrotizing fasciitis as clinically appropriate.   2.  Small pericardial effusion   3.  Left adrenal nodule, density on prior  noncontrast CT demonstrates adenoma.   4.  Hepatomegaly   5.  Enlarged prostate, workup and evaluation for causes of prostate enlargement recommended as clinically appropriate.   6.  Atherosclerosis and atherosclerotic coronary artery disease      These findings were discussed with the patient's clinician, Felix Newell, on 12/11/2023 10:44 PM.      DX-FOOT-2- LEFT   Final Result         1.  No acute traumatic bony injury.   2.  No destructive osseous process is easily identified, evaluation is limited however due to degeneration. Note that osteomyelitis can be difficult to identify on plain film and bone scan would offer improved diagnostic sensitivity as clinically    appropriate.      DX-CHEST-PORTABLE (1 VIEW)   Final Result         1. No acute cardiopulmonary abnormalities are identified.            COURSE & MEDICAL DECISION MAKING     ED Observation Status? Yes; I am placing the patient in to an observation status due to a diagnostic uncertainty as well as therapeutic intensity. Patient placed in observation status at 6:12 PM, 12/11/2023.     Observation plan is as follows: We will manage their symptoms, evaluate with diagnostic testing, and then reassess after results are reviewed     Upon Reevaluation, the patient's condition has: not improved; and will be escalated to hospitalization.    Patient discharged from ED Observation status at 11:30 PM (Time) 12/11/2023  (Date).     INITIAL ASSESSMENT, COURSE AND PLAN  Care Narrative:     6:12 PM - Patient is a 74 y/o male with incomplete quadriplegia who presents for evaluation of fever onset 3PM. Patient reports taking Tylenol at 3PM, and fever reached a T-max of 102 °F at 5PM.  Patient seen and examined at bedside. Discussed plan of care, including lab work and diagnostic imaging. Patient agrees to the plan of care. Ordered for Urinalysis culture, Blood culture x2, Lactic acid, CMP, CBC w/ diff, and DX-Chest to evaluate his symptoms.     7:44 PM - Ordered  for CoV-2, Flu A/B, and RSV by PCR and DX-Foot Left to further evaluate the patient.     7:55 PM - Patient will be medicated with LR infusion.     8:14 PM - Ordered for Urine Microscopic to further evaluate the patient.     9:57 PM - Ordered for CT-Abdomen-Pelvis to further evaluate the patient.     11:06 PM - Paged surgery.     11:26 PM I discussed the patient's case and the above findings with Dr. Bansal (Surgery) who is going to review the patient's imaging and get the patient admitted.     11:40 PM -  I discussed the patient's case and the above findings further with Dr. Bansal (Surgery) who will see the patient in the morning.       HYDRATION: Based on the patient's presentation of Tachycardia the patient was given IV fluids. IV Hydration was used because oral hydration was not adequate alone. Upon recheck following hydration, the patient was improved.    PROBLEM LIST  Problem #1 fever at this point time this appears to be secondary to a deep tissue infection to the right gluteus.  COVID flu and RSV are negative.  Patient does not show any signs of urinary tract infection.  Because I could not find any source of fever CT of the abdomen pelvis was obtained which shows what appears to be a deep tissue infection with gas to the medial buttocks.  Patient has been started on broad-spectrum antibiotics.General surgery has been consulted as well as the hospitalist.    DISPOSITION AND DISCUSSIONS  I have discussed management of the patient with the following physicians and MARCO's:  Dr. Bansal (Surgery), Dr. Almonte (Hospitalist)     Discussion of management with other hospitals or appropriate source(s): Pharmacy antibiotic choice      Barriers to care at this time, including but not limited to:  None .     Decision tools and prescription drugs considered including, but not limited to: HEART Score 4 .    DISPOSITION:  Patient will be hospitalized by Dr. Almonte in guarded condition.    FINAL DIAGNOSIS  1. Pressure injury of  right buttock, stage 3 (HCC)    2. Sepsis without acute organ dysfunction, due to unspecified organism (HCC)        IHeron (Scribe), am scribing for, and in the presence of, MAIN Johnson*.    Electronically signed by: Heron Vale (Scribtray), 12/11/2023    Felix TERAN M.* personally performed the services described in this documentation, as scribed by Heron Vale in my presence, and it is both accurate and complete.       The note accurately reflects work and decisions made by me.  Felix Newell M.D.  12/12/2023  12:54 AM

## 2023-12-12 NOTE — PROGRESS NOTES
4 Eyes Skin Assessment Completed by ZAIRA Earl and ZAIRA Garcia.    Head WDL  Ears WDL  Nose WDL  Mouth WDL  Neck WDL  Breast/Chest WDL  Shoulder Blades WDL  Spine WDL  (R) Arm/Elbow/Hand WDL  (L) Arm/Elbow/Hand WDL  Abdomen Scar, LLQ Colostomy   Groin WDL  Scrotum/Coccyx/Buttocks Ischial, excoriation and tunneling. R buttocks, pressure injury.  (R) Leg Discoloration  (L) Leg Discoloration. Medial, pressure injury.   (R) Heel/Foot/Toe Discoloration  (L) Heel/Foot/Toe 2nd toe, ulcer. Heel, discoloration.          Devices In Places Pulse Ox      Interventions In Place Heel Mepilex, Sacral Mepilex, Waffle Overlay, TAP System, Pillows, and Q2 Turns    Possible Skin Injury Yes    Pictures Uploaded Into Epic Yes  Wound Consult Placed Yes  RN Wound Prevention Protocol Ordered Yes

## 2023-12-12 NOTE — ED NOTES
Med rec complete.    Pt given warm blankets, water given to pt with approval from ERP. Pt reports not hungry at this time   Call bell in reach

## 2023-12-12 NOTE — ED TRIAGE NOTES
"Chief Complaint   Patient presents with    Fever     101.8 this afternoon       BP (!) 187/133   Pulse (!) 114   Temp 37.3 °C (99.2 °F) (Oral)   Resp 15   Ht 1.778 m (5' 10\")   Wt 102 kg (225 lb)   SpO2 95%   BMI 32.28 kg/m²     Pt to ED via WC for c/o fevers started this afternoon.  Pt unable to acknowledge pain r/t quadriplegia.  Pt took Tylenol PTA.   "
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
## 2023-12-12 NOTE — PROGRESS NOTES
Medication history reviewed with pts MAR from [facility] . Med rec is complete.  Allergies reviewed, per pt    Received MAR from [facility] ([id number])    Patient has had  outpatient antibiotics in the last 30 days.  Pt started AUGMENTIN 875-125MG on 12/7/2023 for 10 day course, last dose taken on 12/11/2023 @ 1700    Pt is not on any anticoagulants

## 2023-12-12 NOTE — ASSESSMENT & PLAN NOTE
Small pericardial effusion noted on CT   Patient is not symptomatic and this is likely over interpretation of the read   2D echocardiogram ordered for further evaluation    --echo reported trivial pericardial effusion

## 2023-12-12 NOTE — CONSULTS
Thoracic & General Surgery Consultation          Date of Service  12/12/2023    Referring Physician  Amber Zuniga D.O.    Consulting Physician  Huan Bansal M.D.    Reason for Consultation  Right leg wound    History of Presenting Illness  Mr. Osvaldo Pate is a 73 y.o. male who presented 12/11/2023 with fevers at home to 102 despite tylenol. He denies SOB, Chest pain, change in stool consistency from his ostomy. He notes he has a ischial wound for a month that wound care has been seeing. He states this has been getting worse despite treatment by the wound care team.    He has a history of paraplegia, neurogenic bladder with suprapubic catheter, UTIs, colostomy, A- flutter several years ago,    Review of Systems  All other systems reviewed and negative except as noted above    Past Medical History   has a past medical history of A-fib ([facility]), Acute cystitis without hematuria (10/23/2021), Acute UTI (6/18/2023), Arrhythmia, Atrial flutter ([facility]), Blood clotting disorder ([facility]), Bowel habit changes, Clot hematuria (1/23/2023), GERD (gastroesophageal reflux disease), Hypertension, Hypokalemia (6/18/2023), Kidney stones, Metabolic acidosis (6/18/2023), Neurogenic bladder, Open wound (11/18/2022), Quadriplegia, C5-C7 complete ([facility]), and Suprapubic catheter ([facility]).    Surgical History   has a past surgical history that includes percutaneouospinning lower extremity; colostomy; colostomy takedown; colostomy (N/A, 07/27/2019); ulcer debridement (N/A, 08/21/2019); flap closure (08/21/2019); hernia repair; irrigation & debridement general (12/20/2020); pr cystoscopy,insert ureteral stent (Left, 12/16/2021); pr cysto/uretero/pyeloscopy, dx (Left, 12/16/2021); lasertripsy (Left, 12/16/2021); pr cystoscopy,insert ureteral stent (Left, 01/04/2022); pr cysto/uretero/pyeloscopy, dx (Left, 01/04/2022); lasertripsy (N/A, 01/04/2022); incision and drainage orthopedic (01/22/2022); incision and drainage orthopedic (Left,  01/27/2022); bone biopsy (Left, 01/27/2022); irrigation & debridement general (Left, 04/26/2022); wound closure neuro (Left, 04/26/2022); orthopedic osteotomy (Left, 04/26/2022); and orif, ankle.    Family History  family history includes Heart Disease in his father.    Social History   reports that he quit smoking about 46 years ago. His smoking use included cigarettes. He started smoking about 56 years ago. He has a 10.0 pack-year smoking history. He has never used smokeless tobacco. He reports current alcohol use of about 4.2 oz of alcohol per week. He reports that he does not use drugs.    Medications  Current Facility-Administered Medications   Medication Dose Route Frequency Provider Last Rate Last Admin    baclofen (Lioresal) tablet 20 mg  20 mg Oral 4X/DAY ACHS IMELDA KnightO.   20 mg at 12/12/23 0847    ferrous sulfate tablet 325 mg  325 mg Oral BID IMELDA KnightODeniz   325 mg at 12/12/23 0522    losartan (Cozaar) tablet 50 mg  50 mg Oral Q EVENING Dario Almonte D.O.        magnesium oxide tablet 400 mg  400 mg Oral QAM IMELDA KnightO.   400 mg at 12/12/23 0521    melatonin tablet 10 mg  10 mg Oral QHS IMELDA KnightO.   10 mg at 12/12/23 0152    senna-docusate (Pericolace Or Senokot S) 8.6-50 MG per tablet 2 Tablet  2 Tablet Oral BID IMELDA KnightO.   2 Tablet at 12/12/23 0521    And    polyethylene glycol/lytes (Miralax) Packet 1 Packet  1 Packet Oral QDAY PRN Dario Almonte D.O.        And    magnesium hydroxide (Milk Of Magnesia) suspension 30 mL  30 mL Oral QDAY PRN Dario Almonte D.O.        And    bisacodyl (Dulcolax) suppository 10 mg  10 mg Rectal QDAY PRN Dario Almonte D.O.        NS infusion   Intravenous Continuous Dario Almonte D.O. 83 mL/hr at 12/12/23 0121 New Bag at 12/12/23 0121    acetaminophen (Tylenol) tablet 650 mg  650 mg Oral Q6HRS PRN Dario Almonte D.O.        labetalol (Normodyne/Trandate) injection 10 mg  10 mg Intravenous Q4HRS PRN Dario  "TOBY Almonte D.O.        ondansetron (Zofran) syringe/vial injection 4 mg  4 mg Intravenous Q4HRS PRN Dario Almonte D.O.        ondansetron (Zofran ODT) dispertab 4 mg  4 mg Oral Q4HRS PRN Dario Almonte D.O.        meropenem (Merrem) 500 mg in  mL IV-MBP  500 mg Intravenous Q6HRS Dario Almonte D.O.   Stopped at 12/12/23 0551    MD Alert...Vancomycin per Pharmacy   Other PRN Dario Almonte D.O.        vancomycin 1250 mg/250mL NS IVPB premix  1,250 mg Intravenous Q12HR Dario Almonte D.O.        dakins 0.125% (1/4 strength) topical soln   Topical BID Amber Zuniga D.O.           Allergies  Allergies   Allergen Reactions    Sulfa Drugs Rash     Rash, developed this back in 2015 after being placed on \"sulfa antibiotic for my wound\". Antibiotic was stopped and rash went away. Patient states he had a sulfa antibiotic prior to that time back when he was younger w/o a reaction.          Physical Exam  Temp:  [36.6 °C (97.8 °F)-38.2 °C (100.8 °F)] 36.8 °C (98.3 °F)  Pulse:  [] 74  Resp:  [15-18] 18  BP: (110-187)/() 148/85  SpO2:  [92 %-95 %] 94 %    GEN: Healthy appearing, well developed, no acute distress.  PSYCH: Alert and oriented x3. Normal  memory, mood, and affect.  HEENT     -Head: Normocephalic, atraumatic     -Eyes: PERRL, No discharge or redness;     -Ears: External ears are normal.      -Nose: Normal nares.     -Throat: moist mucus membranes, good dentition    NECK: Supple, trachea midline with no masses.  CV: RRR, radial pulses 2+, brisk cap refill on hands  LUNGS: no labored breathing on room air, no wheezing  ABD: Soft, nontender, nondistended; suprapubic catheter in place; colostomy in LLQ  SKIN: Warm, well perfused. No skin rashes or abnormal lesions. Small 1 x 8 cm wound with granulation tissue along his sacrum; right inferior buttock along ischium with 10 x 10 cm wound with necrotic tissue and foul smell. No cellulitis. No induration.  MSK: strength 5/5 in upper extremities No " deformities  EXT: No clubbing, cyanosis, or edema.  NEURO: paraplegia with normal movements of upper extremities and no movement of lower extremities      Fluids      Laboratory  Recent Labs     12/11/23 1924   WBC 12.0*   RBC 3.97*   HEMOGLOBIN 13.6*   HEMATOCRIT 40.2*   .3*   MCH 34.3*   MCHC 33.8   RDW 48.8   PLATELETCT 152*   MPV 9.3     Recent Labs     12/11/23 1924   SODIUM 138   POTASSIUM 3.8   CHLORIDE 102   CO2 25   GLUCOSE 115*   BUN 17   CREATININE 0.76   CALCIUM 8.9                     Imaging  CT-ABDOMEN-PELVIS WITH   Final Result         1.  Right ischial decubitus ulcer extending to bone with soft tissue gas tracking along the right perineum. Appearance suggesting osteomyelitis, consider component of necrotizing fasciitis as clinically appropriate.   2.  Small pericardial effusion   3.  Left adrenal nodule, density on prior noncontrast CT demonstrates adenoma.   4.  Hepatomegaly   5.  Enlarged prostate, workup and evaluation for causes of prostate enlargement recommended as clinically appropriate.   6.  Atherosclerosis and atherosclerotic coronary artery disease      These findings were discussed with the patient's clinician, Felix Newell, on 12/11/2023 10:44 PM.      DX-FOOT-2- LEFT   Final Result         1.  No acute traumatic bony injury.   2.  No destructive osseous process is easily identified, evaluation is limited however due to degeneration. Note that osteomyelitis can be difficult to identify on plain film and bone scan would offer improved diagnostic sensitivity as clinically    appropriate.      DX-CHEST-PORTABLE (1 VIEW)   Final Result         1. No acute cardiopulmonary abnormalities are identified.      EC-ECHOCARDIOGRAM COMPLETE W/ CONT    (Results Pending)       Assessment/Plan  This is a 73 y.o. male who presents with a gangrenous wound on his right inferior buttock.  The wound is about 10 cm x 10 cm and does not have any surrounding cellulitis.  This is consistent  with the CT scan inflammatory changes in his right buttock.    Will plan for operative debridement and dressing placement today in the OR.  I will need prone positioning.  Continue IV antibiotics with vancomycin and meropenem.      Huan Bansal MD  Thoracic & General Surgeon  [facility]  [phone]
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
## 2023-12-12 NOTE — ED NOTES
Pt back from imaging.    Pt turned on left side d/t pressure ulcers on pt bottom.     Call bell in reach pt reports no further needs at this time
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
## 2023-12-12 NOTE — DIETARY
"Nutrition services: Day 0 of admit.  Osvaldo Pate is a 73 y.o. male with admitting DX of Osteomyelitis (HCC) [M86.9]     Consult received for pt w/ wound POA. Per chart notes, \"gangrenous wound on his right inferior buttock.\" Pt w/ planned OR visit for debridement and dressing placement today. Pt is currently NPO. Per admit screen, pt denies poor PO intake/unplanned wt loss PTA. Has hx of chronic incomplete paraplegia.    Assessment:  Height: 177.8 cm (5' 10\")  Weight: 102 kg (225 lb)  Body mass index is 32.28 kg/m²., BMI classification: Obesity class I  Diet/Intake: NPO    Evaluation:   No documented edema  Pt w/ colostomy LLQ in place, LBM 12/12  Labs: glu 115  MAR: baclofen, ferrous sulfate, magnesium oxide, meropenem, NS, pericolace, vancomycin  Wound team consult pending for L 2nd toe, sacral, R ischium      Malnutrition Risk: no poor PO intake/unplanned wt loss PTA    Recommendations/Plan:  PO diet initiation will be per D.O./M.D.   Encourage intake of meals >75%.  Document intake of all PO as % taken in ADL's to provide interdisciplinary communication across all shifts.   Monitor weight.  Nutrition rep will continue to see patient for ongoing meal and snack preferences.   RD will await wound staging to make recommendations if appropriate.  Monitor for initiation of PO diet and adequate intake to support needs for wound healing, vs need for nutrition interventions based on outcomes of Wound Team assessment.    RD monitoring per dept policy.   "

## 2023-12-12 NOTE — PROGRESS NOTES
4 Eyes Skin Assessment Completed by ZAIRA Smith and Lex RN.    Head WDL  Ears WDL  Nose WDL  Mouth WDL  Neck WDL  Breast/Chest WDL  Shoulder Blades WDL  Spine WDL  (R) Arm/Elbow/Hand WDL  (L) Arm/Elbow/Hand WDL  Abdomen Scar-Colostomy RUQ  Groin WDL  Scrotum/Coccyx/Buttocks Redness, Non-Blanching, and Scar pressure injury - coccyx and ischium   (R) Leg WDL  (L) Leg WDL  (R) Heel/Foot/Toe Discoloration - greater toe black,   (L) Heel/Foot/Toe Non-Blanching and Ulcer(s) - 2nd toe and heel, medial, back of calf pressure injury           Devices In Places Blood Pressure Cuff and Pulse Ox, suprapubic, colostomy      Interventions In Place Heel Mepilex, Heel Float Boots, Waffle Overlay, TAP System, Pillows, and Q2 Turns, fenestrated gauze    Possible Skin Injury Yes    Pictures Uploaded Into Epic Yes  Wound Consult Placed Yes  RN Wound Prevention Protocol Ordered Yes

## 2023-12-13 ENCOUNTER — APPOINTMENT (OUTPATIENT)
Dept: WOUND CARE | Facility: MEDICAL CENTER | Age: 73
End: 2023-12-13
Attending: INTERNAL MEDICINE
Payer: MEDICARE

## 2023-12-13 LAB
ANION GAP SERPL CALC-SCNC: 15 MMOL/L (ref 7–16)
BUN SERPL-MCNC: 11 MG/DL (ref 8–22)
CALCIUM SERPL-MCNC: 8.4 MG/DL (ref 8.4–10.2)
CHLORIDE SERPL-SCNC: 108 MMOL/L (ref 96–112)
CO2 SERPL-SCNC: 17 MMOL/L (ref 20–33)
CREAT SERPL-MCNC: 0.51 MG/DL (ref 0.5–1.4)
ERYTHROCYTE [DISTWIDTH] IN BLOOD BY AUTOMATED COUNT: 49.5 FL (ref 35.9–50)
FUNGUS SPEC FUNGUS STN: NORMAL
GFR SERPLBLD CREATININE-BSD FMLA CKD-EPI: 107 ML/MIN/1.73 M 2
GLUCOSE SERPL-MCNC: 85 MG/DL (ref 65–99)
GRAM STN SPEC: ABNORMAL
HCT VFR BLD AUTO: 40.7 % (ref 42–52)
HGB BLD-MCNC: 13.4 G/DL (ref 14–18)
MCH RBC QN AUTO: 34.1 PG (ref 27–33)
MCHC RBC AUTO-ENTMCNC: 32.9 G/DL (ref 32.3–36.5)
MCV RBC AUTO: 103.6 FL (ref 81.4–97.8)
PLATELET # BLD AUTO: 131 K/UL (ref 164–446)
PMV BLD AUTO: 9.4 FL (ref 9–12.9)
POTASSIUM SERPL-SCNC: 4.2 MMOL/L (ref 3.6–5.5)
RBC # BLD AUTO: 3.93 M/UL (ref 4.7–6.1)
RHODAMINE-AURAMINE STN SPEC: NORMAL
SIGNIFICANT IND 70042: ABNORMAL
SIGNIFICANT IND 70042: NORMAL
SIGNIFICANT IND 70042: NORMAL
SITE SITE: ABNORMAL
SITE SITE: NORMAL
SITE SITE: NORMAL
SODIUM SERPL-SCNC: 140 MMOL/L (ref 135–145)
SOURCE SOURCE: ABNORMAL
SOURCE SOURCE: NORMAL
SOURCE SOURCE: NORMAL
WBC # BLD AUTO: 7.7 K/UL (ref 4.8–10.8)

## 2023-12-13 PROCEDURE — 770001 HCHG ROOM/CARE - MED/SURG/GYN PRIV*

## 2023-12-13 PROCEDURE — 700101 HCHG RX REV CODE 250: Performed by: INTERNAL MEDICINE

## 2023-12-13 PROCEDURE — 94760 N-INVAS EAR/PLS OXIMETRY 1: CPT

## 2023-12-13 PROCEDURE — 700102 HCHG RX REV CODE 250 W/ 637 OVERRIDE(OP): Performed by: INTERNAL MEDICINE

## 2023-12-13 PROCEDURE — A9270 NON-COVERED ITEM OR SERVICE: HCPCS | Performed by: INTERNAL MEDICINE

## 2023-12-13 PROCEDURE — 99223 1ST HOSP IP/OBS HIGH 75: CPT | Performed by: INTERNAL MEDICINE

## 2023-12-13 PROCEDURE — 80048 BASIC METABOLIC PNL TOTAL CA: CPT

## 2023-12-13 PROCEDURE — 36415 COLL VENOUS BLD VENIPUNCTURE: CPT

## 2023-12-13 PROCEDURE — 700111 HCHG RX REV CODE 636 W/ 250 OVERRIDE (IP): Performed by: INTERNAL MEDICINE

## 2023-12-13 PROCEDURE — 700105 HCHG RX REV CODE 258: Performed by: INTERNAL MEDICINE

## 2023-12-13 PROCEDURE — 99232 SBSQ HOSP IP/OBS MODERATE 35: CPT | Performed by: INTERNAL MEDICINE

## 2023-12-13 PROCEDURE — 85027 COMPLETE CBC AUTOMATED: CPT

## 2023-12-13 RX ADMIN — FERROUS SULFATE TAB 325 MG (65 MG ELEMENTAL FE) 325 MG: 325 (65 FE) TAB at 04:44

## 2023-12-13 RX ADMIN — MEROPENEM 500 MG: 500 INJECTION, POWDER, FOR SOLUTION INTRAVENOUS at 17:17

## 2023-12-13 RX ADMIN — MEROPENEM 500 MG: 500 INJECTION, POWDER, FOR SOLUTION INTRAVENOUS at 04:44

## 2023-12-13 RX ADMIN — BACLOFEN 20 MG: 10 TABLET ORAL at 20:32

## 2023-12-13 RX ADMIN — Medication 400 MG: at 05:03

## 2023-12-13 RX ADMIN — BACLOFEN 20 MG: 10 TABLET ORAL at 11:44

## 2023-12-13 RX ADMIN — LOSARTAN POTASSIUM 50 MG: 50 TABLET, FILM COATED ORAL at 17:14

## 2023-12-13 RX ADMIN — Medication 10 MG: at 20:32

## 2023-12-13 RX ADMIN — VANCOMYCIN HYDROCHLORIDE 1250 MG: 5 INJECTION, POWDER, LYOPHILIZED, FOR SOLUTION INTRAVENOUS at 00:46

## 2023-12-13 RX ADMIN — MEROPENEM 500 MG: 500 INJECTION, POWDER, FOR SOLUTION INTRAVENOUS at 11:47

## 2023-12-13 RX ADMIN — VANCOMYCIN HYDROCHLORIDE 1250 MG: 5 INJECTION, POWDER, LYOPHILIZED, FOR SOLUTION INTRAVENOUS at 14:33

## 2023-12-13 RX ADMIN — BACLOFEN 20 MG: 10 TABLET ORAL at 05:04

## 2023-12-13 RX ADMIN — FERROUS SULFATE TAB 325 MG (65 MG ELEMENTAL FE) 325 MG: 325 (65 FE) TAB at 17:13

## 2023-12-13 RX ADMIN — DOCUSATE SODIUM 50 MG AND SENNOSIDES 8.6 MG 2 TABLET: 8.6; 5 TABLET, FILM COATED ORAL at 17:14

## 2023-12-13 RX ADMIN — BACLOFEN 20 MG: 10 TABLET ORAL at 17:13

## 2023-12-13 ASSESSMENT — PATIENT HEALTH QUESTIONNAIRE - PHQ9
1. LITTLE INTEREST OR PLEASURE IN DOING THINGS: NOT AT ALL
1. LITTLE INTEREST OR PLEASURE IN DOING THINGS: NOT AT ALL
2. FEELING DOWN, DEPRESSED, IRRITABLE, OR HOPELESS: NOT AT ALL
2. FEELING DOWN, DEPRESSED, IRRITABLE, OR HOPELESS: NOT AT ALL
SUM OF ALL RESPONSES TO PHQ9 QUESTIONS 1 AND 2: 0
SUM OF ALL RESPONSES TO PHQ9 QUESTIONS 1 AND 2: 0

## 2023-12-13 ASSESSMENT — ENCOUNTER SYMPTOMS
COUGH: 0
CONSTIPATION: 0
DIZZINESS: 0
SPEECH CHANGE: 0
BLURRED VISION: 0
HEARTBURN: 0
CLAUDICATION: 0
SHORTNESS OF BREATH: 0
VOMITING: 0
HEADACHES: 0
PHOTOPHOBIA: 0
DIARRHEA: 0
MYALGIAS: 0
SENSORY CHANGE: 0
ABDOMINAL PAIN: 0
DEPRESSION: 0
NERVOUS/ANXIOUS: 0
CHILLS: 0
INSOMNIA: 0
WEAKNESS: 0
FEVER: 0

## 2023-12-13 ASSESSMENT — PAIN DESCRIPTION - PAIN TYPE
TYPE: ACUTE PAIN
TYPE: ACUTE PAIN;SURGICAL PAIN

## 2023-12-13 NOTE — OR NURSING
2028: Patient arrived from OR via bed.  Surgical dressing on right buttock CDI. No complaints of pain or nausea at this time.    Sedation/Resp Status: Non responsive to verbal with OPA in place.  Respirations spontaneous and non-labored.    HR 65SR; VSS on 6L 02 via simple mask.    2035: OPA out for return of spontaneous eye opening/gag reflex - respirations continue spontaneous and non-labored.     2040: 575ml of bloody urine with small clots present emptied from suprapubic catheter.     2043: Surgical dressing CDI, no complaints of pain or nausea at this time, /83.     2045: Dr. Bansal at bedside, notified of bloody urine.     2058: Surgical dressing CDI, no complaints of pain or nausea at this time, /69.  Urine appears to be less bloody with no clots present.     2108: Dr. Rodriges notified of patient's increased blood pressure. Per Dr. Rodriges, okay to send patient to the floor so that he can be given his night time Losartan dose.     2111: Report called to Alfa Ash on the floor.     2120: Patient transferred to the floor by this RN.

## 2023-12-13 NOTE — PROGRESS NOTES
BSSR received from night RN. Pt is sitting up in bed resting comfortably, not in any distress on 1L at 97%. AAOx4. Pt denies any pain or N/V. Buttocks dressing in place, CDI. Discussed POC. Fall risk precautions in place, locked bed in lowest position, bed alarm on and call light within reach. All needs met at this time. Hourly rounding in place.

## 2023-12-13 NOTE — ANESTHESIA PREPROCEDURE EVALUATION
Case: [id number] Date/Time: 12/12/23 1839    Procedure: IRRIGATION AND DEBRIDEMENT, WOUND/BUTTOCKS (Right)    Location:  OR  / SURGERY [facility]    Surgeons: Huan Bansal M.D.            Relevant Problems   CARDIAC   (positive) Atrial fibrillation (HCC)   (positive) Essential hypertension   (positive) Hypertension   (positive) Paroxysmal atrial flutter (HCC)         (positive) Nephrolithiasis      Other   (positive) Osteomyelitis (HCC)   (positive) Osteomyelitis of left ankle (HCC)       Physical Exam    Airway   Mallampati: II  TM distance: >3 FB  Neck ROM: full       Cardiovascular - normal exam  Rhythm: regular  Rate: normal  (-) murmur     Dental - normal exam           Pulmonary - normal exam  Breath sounds clear to auscultation     Abdominal    Neurological - normal exam               Anesthesia Plan    ASA 3 (chart)       Plan - general       Airway plan will be ETT          Induction: intravenous    Postoperative Plan: Postoperative administration of opioids is intended.    Pertinent diagnostic labs and testing reviewed    Informed Consent:    Anesthetic plan and risks discussed with patient.    Use of blood products discussed with: patient whom consented to blood products.

## 2023-12-13 NOTE — PROGRESS NOTES
4 Eyes Skin Assessment Completed by Alfa RN and Michael RN.    Head WDL  Ears WDL  Nose WDL  Mouth WDL  Neck WDL  Breast/Chest WDL  Shoulder Blades WDL  Spine WDL  (R) Arm/Elbow/Hand Bruising  (L) Arm/Elbow/Hand Bruising  Abdomen Redness  Groin Redness and Swelling  Scrotum/Coccyx/Buttocks Redness on scrotum  (R) Leg Swelling and Shiny,puncture right anterior thigh  (L) Leg Swelling and Shiny  (R) Heel/Foot/Toe Swelling  (L) Heel/Foot/Toe Swelling          Devices In Places Blood Pressure Cuff, Pulse Ox, SCD's, and Nasal Cannula,LLQ colostomy,suprapubic catheter      Interventions In Place Sacral Mepilex, Waffle Overlay, and Q2 Turns    Possible Skin Injury Yes    Pictures Uploaded Into Epic No, needs to be completed  Wound Consult Placed N/A  RN Wound Prevention Protocol Ordered Yes

## 2023-12-13 NOTE — OP REPORT
Thoracic Surgery Operative Report    DATE OF OPERATION:    12/12/2023    PREOPERATIVE DIAGNOSIS:    Necrotic wound in right buttock     POSTOPERATIVE DIAGNOSIS:   Necrotic wound in right buttock     PROCEDURE PERFORMED:   Wound debridement to bone and muscle 10.5 x 7.5 cm with depth 4 cm    SURGEON:      Huan Bansal M.D.    ASSISTANT:          ANESTHESIOLOGIST:    Graham Rodriges M.D.    ANESTHESIA:     General endotracheal anesthesia.    INDICATIONS:    Mr. Osvaldo Pate is a 73 y.o. male with past medical history of paraplegia who has had a wound on his right buttock for the past month that is getting worse and the patient noticed fevers at home and presented to the emergency department for evaluation.  On my evaluation he had a necrotic foul-smelling wound on his right buttock. He is taken to the operating room for debridement of this wound.        FINDINGS:   There was necrotic tissue in the wound bed with an aspect of the wound that tracked superiorly into an abscess.  This tract was opened up to drain into the main wound cavity.  There was also a medial tract that led to an abscess.  This cavity was opened up so could drain into the main wound bed.  The tunneling to the medial aspect was 6.5 cm the tunneling to the superior aspect was 3 cm.  The necrotic tissue included subcutaneous fat and muscle and went down to the bone and what I believe was the capsule of his hip joint.  The total wound size including the areas of tunneling measured 10-1/2 cm x 7-1/2 cm and was 4 cm deep    WOUND CLASSIFICATION:    Class IV, Dirty or Infected. Infection present at the time of surgery (PATOS).    SPECIMEN:       Right buttock wound sent for cultures and pathology..    ESTIMATED BLOOD LOSS:    30 mL.    DESCRIPTION OF PROCEDURE:    The patient was brought to the operating room and a timeout performed and find the patient and procedure be performed.  He was left on his bed and general endotracheal anesthesia  was begun once he was intubated we placed the patient in the prone position on the operating table making sure to pad any points of interest and to prevent his neck from hyperextending.  His buttock and upper legs were prepped and draped in the usual sterile fashion.  A timeout was performed identifying the patient, procedure to be performed, DVT prophylaxis and antibiotic prophylaxis.    The wound was sharply debrided removing necrotic tissue from the superficial aspect of the wound.  I then used electrocautery to debride back necrotic tissue to healthy muscle superiorly I noticed a small tract which was opened up and drained pus.  I also noticed a medial tract that was opened up which also drained pus.  The tracts were open to a point where they could be easily packed.  All necrotic material was debrided from these areas.  I then continued to debride on the main wound deep to get rid of all the necrotic material and at this point I was on top of what appears to be the joint capsule.  This appeared healthy appearing.  I then irrigated the wound copiously.  Hemostasis was achieved with electrocautery.  No other areas of induration or fluctuance were palpated in the surrounding tissues.  I then packed the wound with three quarters of a moist Curlex.  The patient was then placed back on the bed into a supine position from his prone position and extubated.  The patient tolerated the procedure well and without complication.  He was taken the recovery room in good condition.  All counts were correct x 2.      Huan Bansal MD  Thoracic & General Surgeon  [facility]  [phone]
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
## 2023-12-13 NOTE — PROGRESS NOTES
4 Eyes Skin Assessment Completed by ZAIRA Earl and ZAIRA Garcia.    Head WDL  Ears WDL  Nose WDL  Mouth WDL  Neck WDL  Breast/Chest WDL  Shoulder Blades WDL  Spine WDL  (R) Arm/Elbow/Hand WDL  (L) Arm/Elbow/Hand WDL  Abdomen Scar, LLQ Colostomy   Groin WDL  Scrotum/Coccyx/Buttocks Ischial, dressing in place. R buttocks, s/p I&D, tunneling.   (R) Leg Discoloration.   (L) Leg Discoloartion. Medial, dressing in place.  (R) Heel/Foot/Toe Discoloration  (L) Heel/Foot/Toe 2nd toe, dressing in place. Heel, discoloration.          Devices In Places Pulse Ox      Interventions In Place Heel Mepilex, Sacral Mepilex, Waffle Overlay, TAP System, Pillows, and Q2 Turns    Possible Skin Injury Yes    Pictures Uploaded Into Epic Yes  Wound Consult Placed Yes  RN Wound Prevention Protocol Ordered Yes

## 2023-12-13 NOTE — CARE PLAN
The patient is Stable - Low risk of patient condition declining or worsening    Shift Goals  Clinical Goals: Pt will maintain skin integrity, Q2 turns, monitor I&O and free from falls during this shift.  Patient Goals: Able to rest comfortably  Family Goals: n/a    Progress made toward(s) clinical / shift goals:  Q2 repositioning in place. Wound care eval done, cleansed and dressing change. Pt remain kept NPO. Pt did not sustain any falls during this shift.      Patient is not progressing towards the following goals:

## 2023-12-13 NOTE — ANESTHESIA PROCEDURE NOTES
Airway    Date/Time: 12/12/2023 7:36 PM    Performed by: Graham Rodriges M.D.  Authorized by: Graham Rodriges M.D.    Location:  OR  Urgency:  Elective  Difficult Airway: Yes     No neck extension  Indications for Airway Management:  Anesthesia      Spontaneous Ventilation: absent    Sedation Level:  Deep  Preoxygenated: Yes    Patient Position:  Sniffing  Final Airway Type:  Endotracheal airway  Final Endotracheal Airway:  ETT  Cuffed: Yes    Technique Used for Successful ETT Placement:  Video laryngoscopy    Insertion Site:  Oral  Blade Type:  Tone  Laryngoscope Blade/Videolaryngoscope Blade Size:  3  ETT Size (mm):  7.5  Measured from:  Lips  ETT to Lips (cm):  23  Placement Verified by: auscultation and capnometry    Cormack-Lehane Classification:  Grade III - view of epiglottis only  Number of Attempts at Approach:  1  Number of Other Approaches Attempted:  1  Unsuccessful Approach(es) for ETT:  Video laryngoscopy

## 2023-12-13 NOTE — ANESTHESIA TIME REPORT
Anesthesia Start and Stop Event Times       Date Time Event    12/12/2023 1917 Ready for Procedure     1926 Anesthesia Start     2029 Anesthesia Stop          Responsible Staff  12/12/23      Name Role Begin End    Graham Rodriges M.D. Anesth 1926 2029          Overtime Reason:  no overtime (within assigned shift)    Comments:
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
## 2023-12-13 NOTE — ANESTHESIA POSTPROCEDURE EVALUATION
Patient: Osvaldo Pate    Procedure Summary       Date: 12/12/23 Room / Location:  OR  / SURGERY [facility]    Anesthesia Start: 1926 Anesthesia Stop: 2029    Procedure: IRRIGATION AND DEBRIDEMENT, WOUND/BUTTOCKS (Right: Buttocks) Diagnosis: (Right Buttock Gangerous Infection)    Surgeons: Huan Bansal M.D. Responsible Provider: Graham Rodriges M.D.    Anesthesia Type: general ASA Status: 3            Final Anesthesia Type: general  Last vitals  BP   Blood Pressure : 128/57    Temp   37.2 °C (98.9 °F)    Pulse   60   Resp   16    SpO2   92 %      Anesthesia Post Evaluation    Patient location during evaluation: PACU  Patient participation: complete - patient participated  Level of consciousness: awake and alert  Pain score: 0    Airway patency: patent  Anesthetic complications: no  Cardiovascular status: hemodynamically stable  Respiratory status: acceptable  Hydration status: euvolemic    PONV: none        There were no known notable events for this encounter.     Nurse Pain Score: 0 (NPRS)

## 2023-12-13 NOTE — OR NURSING
Pt allergies and NPO status verified. Home medications reconciled. Belongings secured. Pt verbalizes understanding of pain scale, expected course of stay and plan of care. Surgical site verified with pt. IV access assessed. Triple AIM completed. All questions answered. Bed in low position. Call light within reach.
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
## 2023-12-13 NOTE — CARE PLAN
Problem: Skin Integrity  Goal: Skin integrity is maintained or improved  Outcome: Progressing  Note: Every two hours turn,waffle overlay in place,sacral foam in place     Problem: Fall Risk  Goal: Patient will remain free from falls  Outcome: Progressing  Note: Fall prevention measures in place   The patient is Stable - Low risk of patient condition declining or worsening    Shift Goals  Clinical Goals: post op VS,ABX,IV fluids,suprapubic catheter output to clear  Patient Goals: rest,comfort  Family Goals: n/a    Progress made toward(s) clinical / shift goals:  Educated on fall prevention measures,encouraged verbalization of feelings and concerns.Updated on plan of care.Patient verbalized understanding.    Patient is not progressing towards the following goals:

## 2023-12-14 LAB
VANCOMYCIN PEAK 2573: 25.2 UG/ML (ref 20–40)
VANCOMYCIN TROUGH SERPL-MCNC: 16.4 UG/ML (ref 10–20)

## 2023-12-14 PROCEDURE — 700105 HCHG RX REV CODE 258: Performed by: INTERNAL MEDICINE

## 2023-12-14 PROCEDURE — 94760 N-INVAS EAR/PLS OXIMETRY 1: CPT

## 2023-12-14 PROCEDURE — 99232 SBSQ HOSP IP/OBS MODERATE 35: CPT | Performed by: INTERNAL MEDICINE

## 2023-12-14 PROCEDURE — 700102 HCHG RX REV CODE 250 W/ 637 OVERRIDE(OP): Performed by: INTERNAL MEDICINE

## 2023-12-14 PROCEDURE — 80202 ASSAY OF VANCOMYCIN: CPT

## 2023-12-14 PROCEDURE — 36415 COLL VENOUS BLD VENIPUNCTURE: CPT

## 2023-12-14 PROCEDURE — 700101 HCHG RX REV CODE 250: Performed by: INTERNAL MEDICINE

## 2023-12-14 PROCEDURE — 700111 HCHG RX REV CODE 636 W/ 250 OVERRIDE (IP): Performed by: INTERNAL MEDICINE

## 2023-12-14 PROCEDURE — A9270 NON-COVERED ITEM OR SERVICE: HCPCS | Performed by: INTERNAL MEDICINE

## 2023-12-14 PROCEDURE — 99233 SBSQ HOSP IP/OBS HIGH 50: CPT | Performed by: INTERNAL MEDICINE

## 2023-12-14 PROCEDURE — 770001 HCHG ROOM/CARE - MED/SURG/GYN PRIV*

## 2023-12-14 RX ORDER — AMLODIPINE BESYLATE 5 MG/1
5 TABLET ORAL
Status: DISCONTINUED | OUTPATIENT
Start: 2023-12-14 | End: 2023-12-23 | Stop reason: HOSPADM

## 2023-12-14 RX ORDER — DOCUSATE SODIUM 100 MG/1
100 CAPSULE, LIQUID FILLED ORAL 2 TIMES DAILY
Status: DISCONTINUED | OUTPATIENT
Start: 2023-12-14 | End: 2023-12-18

## 2023-12-14 RX ORDER — SENNOSIDES A AND B 8.6 MG/1
8.6 TABLET, FILM COATED ORAL 2 TIMES DAILY
Status: DISCONTINUED | OUTPATIENT
Start: 2023-12-14 | End: 2023-12-18

## 2023-12-14 RX ORDER — AMLODIPINE BESYLATE 5 MG/1
5 TABLET ORAL PRN
Status: DISCONTINUED | OUTPATIENT
Start: 2023-12-14 | End: 2023-12-14

## 2023-12-14 RX ADMIN — BACLOFEN 20 MG: 10 TABLET ORAL at 21:50

## 2023-12-14 RX ADMIN — BACLOFEN 20 MG: 10 TABLET ORAL at 17:55

## 2023-12-14 RX ADMIN — FERROUS SULFATE TAB 325 MG (65 MG ELEMENTAL FE) 325 MG: 325 (65 FE) TAB at 04:53

## 2023-12-14 RX ADMIN — MEROPENEM 500 MG: 500 INJECTION, POWDER, FOR SOLUTION INTRAVENOUS at 17:58

## 2023-12-14 RX ADMIN — DOCUSATE SODIUM 100 MG: 100 CAPSULE, LIQUID FILLED ORAL at 17:55

## 2023-12-14 RX ADMIN — AMLODIPINE BESYLATE 5 MG: 5 TABLET ORAL at 11:11

## 2023-12-14 RX ADMIN — VANCOMYCIN HYDROCHLORIDE 1250 MG: 5 INJECTION, POWDER, LYOPHILIZED, FOR SOLUTION INTRAVENOUS at 00:40

## 2023-12-14 RX ADMIN — VANCOMYCIN HYDROCHLORIDE 1750 MG: 5 INJECTION, POWDER, LYOPHILIZED, FOR SOLUTION INTRAVENOUS at 14:02

## 2023-12-14 RX ADMIN — MEROPENEM 500 MG: 500 INJECTION, POWDER, FOR SOLUTION INTRAVENOUS at 00:02

## 2023-12-14 RX ADMIN — MEROPENEM 500 MG: 500 INJECTION, POWDER, FOR SOLUTION INTRAVENOUS at 04:53

## 2023-12-14 RX ADMIN — DOCUSATE SODIUM 50 MG AND SENNOSIDES 8.6 MG 2 TABLET: 8.6; 5 TABLET, FILM COATED ORAL at 06:00

## 2023-12-14 RX ADMIN — LOSARTAN POTASSIUM 50 MG: 50 TABLET, FILM COATED ORAL at 17:54

## 2023-12-14 RX ADMIN — Medication 400 MG: at 04:52

## 2023-12-14 RX ADMIN — FERROUS SULFATE TAB 325 MG (65 MG ELEMENTAL FE) 325 MG: 325 (65 FE) TAB at 17:54

## 2023-12-14 RX ADMIN — SENNOSIDES 8.6 MG: 8.6 TABLET, FILM COATED ORAL at 17:55

## 2023-12-14 RX ADMIN — DOCUSATE SODIUM 100 MG: 100 CAPSULE, LIQUID FILLED ORAL at 11:10

## 2023-12-14 RX ADMIN — BACLOFEN 20 MG: 10 TABLET ORAL at 06:25

## 2023-12-14 RX ADMIN — MEROPENEM 500 MG: 500 INJECTION, POWDER, FOR SOLUTION INTRAVENOUS at 13:11

## 2023-12-14 RX ADMIN — BACLOFEN 20 MG: 10 TABLET ORAL at 11:10

## 2023-12-14 RX ADMIN — Medication 10 MG: at 21:47

## 2023-12-14 ASSESSMENT — ENCOUNTER SYMPTOMS
COUGH: 0
VOMITING: 0
WEAKNESS: 0
PHOTOPHOBIA: 0
DIZZINESS: 0
CONSTIPATION: 0
ABDOMINAL PAIN: 0
MYALGIAS: 0
BLURRED VISION: 0
SHORTNESS OF BREATH: 0
FEVER: 0
CLAUDICATION: 0
DEPRESSION: 0
DIARRHEA: 0
INSOMNIA: 0
HEADACHES: 0
SENSORY CHANGE: 0
HEARTBURN: 0
NERVOUS/ANXIOUS: 0
WEAKNESS: 1
SPEECH CHANGE: 0
CHILLS: 0

## 2023-12-14 ASSESSMENT — PAIN DESCRIPTION - PAIN TYPE
TYPE: ACUTE PAIN
TYPE: ACUTE PAIN

## 2023-12-14 NOTE — PROGRESS NOTES
"Mr. Osvaldo Pate is a 73 y.o. male who presented to the hospital with a pressure ulcer on his right buttock now s/p debridement down to bone with likely involvement of joint    S: Feeling a little better today. No nausea or vomiting. Stooling in bag.    O:  BP (!) 163/89   Pulse (!) 57   Temp 36.8 °C (98.2 °F) (Temporal)   Resp 16   Ht 1.778 m (5' 10\")   Wt 102 kg (225 lb)   SpO2 91%   BMI 32.28 kg/m²     GEN: awake, alert oriented  CV: RRR, brisk cap refill on hands  RESP: no labored breathing  ABD: soft, nontender, nondistended; colostomy in LLQ and suprapubic catheter in place  Skin: sacral wound small and healing well with granulation tissue  Right buttock wound has no necrotic tissue. Small amount of bleeding easily packed.     Recent Labs     12/11/23 1924 12/13/23  0127   WBC 12.0* 7.7   RBC 3.97* 3.93*   HEMOGLOBIN 13.6* 13.4*   HEMATOCRIT 40.2* 40.7*   .3* 103.6*   MCH 34.3* 34.1*   RDW 48.8 49.5   PLATELETCT 152* 131*   MPV 9.3 9.4   NEUTSPOLYS 81.80*  --    LYMPHOCYTES 6.90*  --    MONOCYTES 10.30  --    EOSINOPHILS 0.20  --    BASOPHILS 0.20  --      Recent Labs     12/11/23 1924 12/13/23  0127   SODIUM 138 140   POTASSIUM 3.8 4.2   CHLORIDE 102 108   CO2 25 17*   GLUCOSE 115* 85   BUN 17 11     INR   Date Value Ref Range Status   06/17/2023 1.25 (H) 0.87 - 1.13 Final     Comment:     Reference range:  INR - Non-therapeutic Reference Range: 0.87-1.13  INR - Therapeutic Reference Range: 2.0-4.0         Imaging:    A/P:   Mr. Osvaldo Pate is a 73 y.o. male who presented to the hospital with a pressure ulcer on his right buttock now s/p debridement down to bone with likely involvement of joint on 12/12/2023    -right buttock wound looks good for VAC therapy. There is a 7 cm tunnel medially and a 3 tract superiorly. OR measurements 10.5 x 7.5 and 4 cm deep to bone and maybe joint capsule.  -consult wound team for VAC placement; can do daily and PRN dressing changes with " moist garcía Bansal MD  Thoracic & General Surgeon  [facility]  [phone]

## 2023-12-14 NOTE — DIETARY
Nutrition Services: Update   Day 2 of admit.  Osvaldo Pate is a 73 y.o. male with admitting DX of Osteomyelitis (HCC) [M86.9]    Pt is currently on Regular diet. PO intake per ADLs % x 3 meals yesterday. Per wound team assessment of pt on 12/12/23: partial thickness L wound, stage 4 coccyx pressure wound, full thickness 2nd L toe wound, R buttocks pressure injury. Per Computrition compared w/ PO intake documented in ADLs, pt is likely meeting kcal needs, though falling short on protein necessary for wound-healing. RD visited pt at bedside; explained necessity of increasing protein intake. Pt is agreeable to Boost Glucose Control and BID Arginaid w/ meals to provide nutrients of concern to support wound healing process.    Recommendations/Plan:  Provide BID Boost Glucose Control and BID Arginaid supplementation to optimize wound healing for pt w/ multiple wounds, including a stage IV coccyx pressure wound.   Encourage intake of meals and supplements %.  Document intake of all meals as % taken in ADL's to provide interdisciplinary communication across all shifts.   Monitor weight.  Nutrition rep will continue to see patient for ongoing meal and snack preferences.    RD following

## 2023-12-14 NOTE — PROGRESS NOTES
Infectious Disease Progress Note    Author: Rosmery Crabtree M.D. Date & Time of service: 2023  11:17 AM    Chief Complaint:  Ischial osteomyelitis with abscess    Interval History:   73 y.o. quadriplegic male admitted 2023 for new right ischial decubitus with osteomyelitis   AF WBC 7.7 cultures neg Path +abscess  Labs Reviewed and Medications Reviewed.    Review of Systems:  Review of Systems   Constitutional:  Negative for fever.   Neurological:  Positive for weakness.       Hemodynamics:  Temp (24hrs), Av.8 °C (98.2 °F), Min:36.7 °C (98 °F), Max:36.9 °C (98.4 °F)  Temperature: 36.7 °C (98 °F)  Pulse  Av  Min: 57  Max: 114   Blood Pressure : 136/65       Physical Exam:  Physical Exam  Vitals and nursing note reviewed.   Cardiovascular:      Rate and Rhythm: Normal rate.   Pulmonary:      Effort: Pulmonary effort is normal.   Musculoskeletal:      Comments: VAC right buttock   Neurological:      Comments: quadriplegic         Meds:    Current Facility-Administered Medications:     docusate sodium    sennosides    amLODIPine    MD Alert...Vancomycin per Pharmacy    vancomycin    baclofen    ferrous sulfate    losartan    melatonin    acetaminophen    labetalol    ondansetron    ondansetron    magnesium oxide    meropenem    Labs:  Recent Labs     23   WBC 12.0* 7.7   RBC 3.97* 3.93*   HEMOGLOBIN 13.6* 13.4*   HEMATOCRIT 40.2* 40.7*   .3* 103.6*   MCH 34.3* 34.1*   RDW 48.8 49.5   PLATELETCT 152* 131*   MPV 9.3 9.4   NEUTSPOLYS 81.80*  --    LYMPHOCYTES 6.90*  --    MONOCYTES 10.30  --    EOSINOPHILS 0.20  --    BASOPHILS 0.20  --      Recent Labs     23   SODIUM 138 140   POTASSIUM 3.8 4.2   CHLORIDE 102 108   CO2 25 17*   GLUCOSE 115* 85   BUN 17 11     Recent Labs     23   ALBUMIN 3.8  --    TBILIRUBIN 0.8  --    ALKPHOSPHAT 66  --    TOTPROTEIN 7.5  --    ALTSGPT 7  --    ASTSGOT 10*  --     CREATININE 0.76 0.51       Imaging:  EC-ECHOCARDIOGRAM COMPLETE W/O CONT    Result Date: 2023  Transthoracic Echo Report Echocardiography Laboratory CONCLUSIONS Prior study on 22, compared to the report of the prior study, there has been no significant change. Normal left ventricular systolic function. The left ventricular ejection fraction is visually estimated to be 60%. Mild aortic insufficiency. Estimated right ventricular systolic pressure is 25 mmHg. Trivial pericardial effusion. NICHOLAS CASEY Exam Date:         2023                    17:40 Exam Location:     Inpatient Priority:          Routine Ordering Physician:        EMILY BROWNE Referring Physician:       [id number] Choe Sonographer:               Bety RUST Age:    73     Gender:    M MRN:    [id number] :    1950 BSA:    2.19   Ht (in):    70     Wt (lb):    225 Exam Type:     Complete Indications:     Shortness of breath ICD Codes:       786.05 CPT Codes:       64743 BP:   123    /   58     HR:   73 Technical Quality:       Fair MEASUREMENTS  (Male / Female) Normal Values 2D ECHO LV Diastolic Diameter PLAX        5.4 cm                4.2 - 5.9 / 3.9 - 5.3 cm LV Systolic Diameter PLAX         3.6 cm                2.1 - 4.0 cm IVS Diastolic Thickness           1.7 cm                LVPW Diastolic Thickness          1.6 cm                LVOT Diameter                     2.1 cm                Estimated LV Ejection Fraction    60 %                  LV Ejection Fraction MOD BP       59.2 %                >= 55  % LV Ejection Fraction MOD 4C       56.7 %                LV Ejection Fraction MOD 2C       60.3 %                IVC Diameter                      1.9 cm                DOPPLER AV Peak Velocity                  2 m/s                 AV Peak Gradient                  16 mmHg               AV Mean Gradient                  7.7 mmHg              AI Pressure Half Time             517 ms                LVOT  What Is The Reason For Today's Visit?: Full Body Skin Examination Peak Velocity                1.1 m/s               AV Area Cont Eq vti               1.8 cm2               TR Peak Velocity                  209 cm/s              PV Peak Velocity                  1 m/s                 PV Peak Gradient                  4.1 mmHg              RVOT Peak Velocity                1 m/s                 * Indicates values subject to auto-interpretation LV EF:  60    % FINDINGS Left Ventricle The left ventricle is normal in size .moderate concentric left ventricular hypertrophy. Normal left ventricular systolic function. The left ventricular ejection fraction is visually estimated to be 60%. Normal regional wall motion. Normal diastolic function. Right Ventricle The right ventricle is normal in size and systolic function. Right Atrium The right atrium is normal in size. Normal inferior vena cava size and inspiratory collapse. Left Atrium Mildly dilated left atrium. Left atrial volume index is  39.51mL/sq m. Mitral Valve Structurally normal mitral valve without significant stenosis. Trace mitral regurgitation. Aortic Valve Structurally normal aortic valve without significant stenosis. Mild aortic insufficiency. Mild aortic annular calcification. Tricuspid Valve Structurally normal tricuspid valve without significant stenosis. Trace tricuspid regurgitation.  Right atrial pressure is estimated to be 3 mmHg. Estimated right ventricular systolic pressure is 25 mmHg. Pulmonic Valve Structurally normal pulmonic valve without significant stenosis or regurgitation. Pericardium Trivial pericardial effusion. Aorta Normal aortic root for body surface area. The ascending aorta diameter is  3.7cm. Jose Avendano M.D. (Electronically Signed) Final Date:     12 December 2023                 19:32    CT-ABDOMEN-PELVIS WITH    Result Date: 12/11/2023 12/11/2023 9:59 PM HISTORY/REASON FOR EXAM:  RLQ Pain; fever. TECHNIQUE/EXAM DESCRIPTION:   CT scan of the abdomen and pelvis with contrast. Contrast-enhanced  What Is The Reason For Today's Visit? (Being Monitored For X): concerning skin lesions on a periodic basis helical scanning was obtained from the diaphragmatic domes through the pubic symphysis following the bolus administration of nonionic contrast without complication. 100 mL of Omnipaque 350 nonionic contrast was administered without complication. Low dose optimization technique was utilized for this CT exam including automated exposure control and adjustment of the mA and/or kV according to patient size. COMPARISON: No prior studies available. FINDINGS: Lower Chest: Unremarkable. Small pericardial effusion is noted. Liver: Hepatomegaly is seen. Spleen: Unremarkable. Pancreas: Unremarkable. Gallbladder: No calcified stones. Biliary: Nondilated. Adrenal glands: 3.0 cm left adrenal nodule is seen measuring 62 Hounsfield units. Kidneys: Unremarkable without hydronephrosis. Bowel: No obstruction or acute inflammation. The appendix appears within normal limits. Left lower quadrant colostomy is seen. Lymph nodes: No adenopathy. Vasculature: Atherosclerotic changes are seen including atherosclerotic coronary artery calcifications. Peritoneum: Unremarkable without ascites. Musculoskeletal: No acute or destructive process.There is right ischial decubitus ulcer with soft tissue gas extending to near the bone surface and tracking along the right peritoneum. Pelvis: No adenopathy or free fluid. The prostate appears enlarged. Suprapubic catheter is in place.     1.  Right ischial decubitus ulcer extending to bone with soft tissue gas tracking along the right perineum. Appearance suggesting osteomyelitis, consider component of necrotizing fasciitis as clinically appropriate. 2.  Small pericardial effusion 3.  Left adrenal nodule, density on prior noncontrast CT demonstrates adenoma. 4.  Hepatomegaly 5.  Enlarged prostate, workup and evaluation for causes of prostate enlargement recommended as clinically appropriate. 6.  Atherosclerosis and atherosclerotic coronary artery disease These findings were discussed with the patient's  clinician, Felix Newell, on 12/11/2023 10:44 PM.    DX-FOOT-2- LEFT    Result Date: 12/11/2023 12/11/2023 7:59 PM HISTORY/REASON FOR EXAM: Atraumatic Pain/Swelling/Deformity; left second TECHNIQUE/EXAM DESCRIPTION:  AP, and lateral views of the LEFT foot. COMPARISON:  April 1, 2022 FINDINGS: Soft tissue hyperostosis is seen in the medial leg, similar to prior study. There is no acute fracture, subluxation, or dislocation identified. There is bony resorption of the distal fifth metatarsal head and neck. No destructive osseous lesion is readily apparent.     1.  No acute traumatic bony injury. 2.  No destructive osseous process is easily identified, evaluation is limited however due to degeneration. Note that osteomyelitis can be difficult to identify on plain film and bone scan would offer improved diagnostic sensitivity as clinically appropriate.    DX-CHEST-PORTABLE (1 VIEW)    Result Date: 12/11/2023 12/11/2023 6:24 PM HISTORY/REASON FOR EXAM:  Fever TECHNIQUE/EXAM DESCRIPTION AND NUMBER OF VIEWS: Single portable view of the chest. COMPARISON: 6/17/2023 FINDINGS: Old right rib fractures No pulmonary infiltrates or consolidations are noted. No pleural effusion. No pneumothorax. Stable cardiopericardial silhouette.     1. No acute cardiopulmonary abnormalities are identified.      Micro:  Results       Procedure Component Value Units Date/Time    Fungal Culture [[id number]] Collected: 12/12/23 2004    Order Status: Completed Specimen: Tissue Updated: 12/14/23 0729     Significant Indicator NEG     Source TISS     Site Left buttock wound     Culture Result Culture in progress.     Fungal Smear Results No fungal elements seen.    Narrative:      A Left buttock wound  Surgery Specimen    Anaerobic Culture [[id number]] Collected: 12/12/23 2004    Order Status: No result Specimen: Tissue Updated: 12/14/23 0729     Significant Indicator NEG     Source TISS     Site Left buttock wound     Culture Result -     Narrative:      A Left buttock wound  Surgery Specimen    CULTURE TISSUE W/ GRM STAIN [[id number]] Collected: 12/12/23 2004    Order Status: Completed Specimen: Tissue Updated: 12/14/23 0729     Significant Indicator NEG     Source TISS     Site Left buttock wound     Culture Result Culture in progress.     Gram Stain Result Moderate WBCs.  Moderate Gram positive cocci.  Few Gram positive rods.  Few Gram negative rods.      Narrative:      A Left buttock wound  Surgery Specimen    AFB Culture [[id number]] Collected: 12/12/23 2004    Order Status: Completed Specimen: Tissue Updated: 12/14/23 0729     Significant Indicator NEG     Source TISS     Site Left buttock wound     Culture Result Culture in progress.     AFB Smear Results No acid fast bacilli seen.    Narrative:      A Left buttock wound  Surgery Specimen    GRAM STAIN [[id number]]  (Abnormal) Collected: 12/12/23 2004    Order Status: Completed Specimen: Tissue Updated: 12/13/23 1819     Significant Indicator .     Source TISS     Site Left buttock wound     Gram Stain Result Moderate WBCs.  Moderate Gram positive cocci.  Few Gram positive rods.  Few Gram negative rods.      Narrative:      A Left buttock wound  Surgery Specimen    Fungal Smear [[id number]] Collected: 12/12/23 2004    Order Status: Completed Specimen: Tissue Updated: 12/13/23 1819     Significant Indicator NEG     Source TISS     Site Left buttock wound     Fungal Smear Results No fungal elements seen.    Narrative:      A Left buttock wound  Surgery Specimen    Acid Fast Stain [[id number]] Collected: 12/12/23 2004    Order Status: Completed Specimen: Tissue Updated: 12/13/23 1819     Significant Indicator NEG     Source TISS     Site Left buttock wound     AFB Smear Results No acid fast bacilli seen.    Narrative:      A Left buttock wound  Surgery Specimen    CULTURE WOUND W/ GRAM STAIN [[id number]] Collected: 12/12/23 0523    Order Status: Completed Specimen: Wound from Perianal Updated:  "12/13/23 1355     Significant Indicator NEG     Source WND     Site PERIANAL     Culture Result Moderate growth usual site specdific ade including Proteus  sp.       Gram Stain Result Many WBCs.  Many Gram positive cocci in chains.  Few Gram negative rods.      Narrative:      Swab received    GRAM STAIN [[id number]] Collected: 12/12/23 0523    Order Status: Completed Specimen: Wound Updated: 12/12/23 1454     Significant Indicator .     Source WND     Site PERIANAL     Gram Stain Result Many WBCs.  Many Gram positive cocci in chains.  Few Gram negative rods.      Narrative:      Swab received    MRSA By PCR (Amp) [[id number]] Collected: 12/12/23 0523    Order Status: Completed Specimen: Respirate from Nares Updated: 12/12/23 1235     MRSA by PCR Negative    Narrative:      Collected By: [id number] RICARDO MAYBERRY    BLOOD CULTURE [[id number]] Collected: 12/11/23 1927    Order Status: Completed Specimen: Blood from Peripheral Updated: 12/12/23 0737     Significant Indicator NEG     Source BLD     Site PERIPHERAL     Culture Result No Growth  Note: Blood cultures are incubated for 5 days and  are monitored continuously.Positive blood cultures  are called to the RN and reported as soon as  they are identified.  Blood culture testing and Gram stain, if indicated, are  performed at [facility], [address] [place] [address].  Positive blood cultures are  sent to [facility], [address] [place] [address], for organism identification and  susceptibility testing.      Narrative:      Per Hospital Policy: Only change Specimen Src: to \"Line\" if  specified by physician order.  Left AC    BLOOD CULTURE [[id number]] Collected: 12/11/23 1927    Order Status: Completed Specimen: Blood from Peripheral Updated: 12/12/23 0737     Significant Indicator NEG     Source BLD     Site PERIPHERAL     Culture Result No Growth  Note: Blood cultures are incubated for 5 days and  are monitored " "continuously.Positive blood cultures  are called to the RN and reported as soon as  they are identified.  Blood culture testing and Gram stain, if indicated, are  performed at [facility], [address] [place] [address].  Positive blood cultures are  sent to [facility], [address] [place] [address], for organism identification and  susceptibility testing.      Narrative:      Per Hospital Policy: Only change Specimen Src: to \"Line\" if  specified by physician order.  Right Forearm/Arm    CoV-2, FLU A/B, and RSV by PCR (2-4 Hours CEPAthersysID) : Collect NP swab in VTM [[id number]] Collected: 12/11/23 1938    Order Status: Completed Specimen: Respirate Updated: 12/11/23 2101     Influenza virus A RNA Negative     Influenza virus B, PCR Negative     RSV, PCR Negative     SARS-CoV-2 by PCR NotDetected     Comment: [facility] providers: PLEASE REFER TO DE-ESCALATION AND RETESTING PROTOCOL  on [email or web address]    **The Patient Home Monitoring GeneXpert Xpress SARS-CoV-2 RT-PCR Test has been made  available for use under the Emergency Use Authorization (EUA) only.          SARS-CoV-2 Source NP Swab    URINALYSIS CULTURE, IF INDICATED [[id number]]  (Abnormal) Collected: 12/11/23 2014    Order Status: Completed Specimen: Urine, Suprapubic Updated: 12/11/23 2043     Color Yellow     Character Clear     Specific Gravity 1.025     Ph 6.0     Glucose Negative mg/dL      Ketones 40 mg/dL      Protein 100 mg/dL      Bilirubin Negative     Nitrite Negative     Leukocyte Esterase Negative     Occult Blood Small     Micro Urine Req Microscopic    Narrative:      Indication for culture:->Evaluation for sepsis without a  clear source of infection            Assessment:  Active Hospital Problems    Diagnosis     *Osteomyelitis (HCC) [M86.9]     SIRS (systemic inflammatory response syndrome) (HCC) [R65.10]     Hyperglycemia [R73.9]     Pericardial effusion [I31.39]     Chronic incomplete quadriplegia (HCC) " [G82.50]     Essential hypertension [I10]     Paroxysmal atrial flutter (HCC) [I48.92]    Right ischial osteomyelitis  -s/p debridement necrotic muscle and bone 12/12  -gram stain polymicrobial :GPC, GPR, GNR  Recent (3/23) :Left leg osteomyelitis-Polymicrobial wound infection-MRSA and ESBL Proteus  Left second toe with dorsal ulceration and erythema  C5-C7 paraplegia  Chronic decubitus ulcers incl sacaral  History of sacral osteomyelitis with abscess     Allergic to sulfa     Plan:  Recommend Ortho eval left foot  FU operative cultures  Continue meropenem due to prior ESBL and GNR seen on gram stain.   Continue vanco for now-although negative MRSA screen and gram stain did not show GPC in clusters wound culture of left toe +MRSA 9/13/2023 (resistant to doxy)  Anticipate 6 week course  Final antibiotic recommendations per culture results and clinical course    PICC yes  Wound care

## 2023-12-14 NOTE — CARE PLAN
Problem: Skin Integrity  Goal: Skin integrity is maintained or improved  Outcome: Progressing     Problem: Fall Risk  Goal: Patient will remain free from falls  Outcome: Progressing  Note: Fall prevention measures in place   The patient is Stable - Low risk of patient condition declining or worsening    Shift Goals  Clinical Goals: Abx,evry2 hrs turn  Patient Goals: rest,comfort  Family Goals: n/a    Progress made toward(s) clinical / shift goals:  Educated on fall prevention,encouraged use of call light,updated on plan of care.Patient verbalized understanding.    Patient is not progressing towards the following goals:

## 2023-12-14 NOTE — ASSESSMENT & PLAN NOTE
Patient reiterated the importance of his scheduled bowel regimen, senna twice daily, Colace twice daily, I got rid of bowel protocol and placed him on his home regimen.

## 2023-12-14 NOTE — DISCHARGE PLANNING
Case Management Discharge Planning    Admission Date: 12/11/2023  GMLOS: 7.8  ALOS: 2    6-Clicks ADL Score: 6  6-Clicks Mobility Score: 6  PT and/or OT Eval ordered: No  Post-acute Referrals Ordered: Yes  Post-acute Choice Obtained: Yes  Has referral(s) been sent to post-acute provider:  Yes      Anticipated Discharge Dispo: Discharge Disposition: D/T to Medicare cert LTCH w/planned hosp IP readmit (91)    DME Needed: No    Action(s) Taken: RNCM attended IDT rounds and reviewed chart.  Per IDT rounds discussion, anticipate pt will have LTACH placement need. LTACH referral placed per protocol. Choice form completed for [facility] and emailed to [facility].    RNCM provided list of local [employer] companies per pt request.    Escalations Completed: None    Medically Clear: No    Next Steps:  f/u with medical team to discuss DC needs and barriers    Barriers to Discharge: Medical clearance and Pending Placement

## 2023-12-14 NOTE — DOCUMENTATION QUERY
[facility]                                                                       Query Response Note      PATIENT:               NICHOLAS CASEY  ACCT #:                  [id number]  MRN:                     [id number]  :                      1950  ADMIT DATE:       2023 5:43 PM  DISCH DATE:          RESPONDING  PROVIDER #:        [id number]           QUERY TEXT:    Sharp debridement is documented in the  OP report. Please specify the type.        The patient's Clinical Indicators include:   OP report -  wound was sharply debrided removing necrotic tissue    Treatment -  Necrotic wound R buttock debridement to bone and muscle 10.5 x 7.5 cm with depth 4 cm    Risk factors - Necrotic wound R buttock, osteomyelitis, incomplete quadriplegia    Thank you,  Magdalena Abdalla BSN  Clinical   Connect via Temporal Power  Options provided:   -- Non-excisional debridement   -- Excisional debridement, Please specify the following details- Deepest level of debridement treated, instrument used, nature of tissue, appearance/size of wound, and technique   -- Other explanation, (please specify the other explanation)   -- Unable to determine      Query created by: Magdalena Abdalla on 2023 1:51 PM    RESPONSE TEXT:    excisional debridement- to bone and muscle       QUERY TEXT:    Based on the documentation by the ED provider, general surgeon, and wound RN, please clarify the clinical/diagnostic relevance for the documented findings of the R buttock pressure injury / Superior Coccyx stage 4 pressure injury    The patient's clinical indicators include:  ED provider note - Dx Pressure injury of right buttock, stage 3    Gen Surg consult - gangrenous wound R inferior buttock   Wound RN note - 23 Pressure Injury Coccyx Superior (Active)...Pressure Injury Stage -Stage 4; Pressure Injury Buttocks Right  (Active)  Present on Original Admission: Yes  Primary Wound Type: Pressure Injury  Location: Buttocks (no stage)    Treatment - Wound RN consult; Wound debridement to bone and muscle 10.5 x 7.5 cm with depth 4 cm    Risk factors - Necrotic wound R buttock, osteomyelitis, incomplete quadriplegia    Thank you,  Magdalena Abdalla BSN  Clinical   Connect via WayConnected  Options provided:   -- Pressure injury R buttock stage 3 is clinically significant and ruled in   -- Pressure injury R buttock unstaged and pressure injury superior coccyx stage 4 are clinically significant and ruled in   -- Other explanation, (please specify the other explanation)   -- Unable to determine      Query created by: Magdalena Abdalla on 12/13/2023 1:51 PM    RESPONSE TEXT:    Pressure injury R buttock unstaged and pressure injury superior coccyx stage 4 are clinically significant and ruled in          Electronically signed by:  ANDREIA BECK 12/13/2023 5:38 PM

## 2023-12-14 NOTE — PROGRESS NOTES
Pharmacy Vancomycin Kinetics Note for 12/14/2023     73 y.o. male on Vancomycin day # 2     Vancomycin Indication (AUC Dosing):    Vancomycin Indication (Two level): Osteomyelitis (goal trough 10-15)  Vancomycin Indication (Trough based Dosing):      Provider specified end date: 12/27/23    Active Antibiotics (From admission, onward)      Ordered     Ordering Provider       Wed Dec 13, 2023 11:49 AM    12/13/23 1149  vancomycin 1250 mg/250mL NS IVPB premix  EVERY 12 HOURS        Note to Pharmacy: Per P&T vanco per pharmacy Protocol    Rosmrey Crabtree M.D.       Wed Dec 13, 2023 11:43 AM    12/13/23 1143  MD Alert...Vancomycin per Pharmacy  PHARMACY TO DOSE        Question:  Indication(s) for vancomycin?  Answer:  Osteomyelitis    Rosmery Crabtree M.D.       Tue Dec 12, 2023  9:41 PM    12/12/23 2141  meropenem (Merrem) 500 mg in  mL IV-MBP  EVERY 6 HOURS        Question:  Indication  Answer:  Skin and soft tissue infection    Dario Almonte D.O.            Dosing Weight: 102 kg (224 lb 13.9 oz)      Admission History: Admitted on 12/11/2023 for Osteomyelitis (HCC) [M86.9]  Pertinent history: Admitted on 12/11/2023 for Osteomyelitis  Pertinent history: Patient with chronic sacral ulcer presenting with a fever and elevated WBC of 12 with concern for worsening infection and imaging now showing possible osteo. HIstory of ESBL and MRSA. Empiric vanco and meropenem therapy initiated.    Allergies:     Sulfa drugs     Pertinent cultures to date:     Results       Procedure Component Value Units Date/Time    AFB Culture [[id number]] Collected: 12/12/23 2004    Order Status: Completed Specimen: Tissue Updated: 12/14/23 1327     Significant Indicator NEG     Source TISS     Site Left buttock wound     Culture Result Culture in progress.     AFB Smear Results No acid fast bacilli seen.    Narrative:      A Left buttock wound  Surgery Specimen    Fungal Culture [[id number]] Collected: 12/12/23 2004    Order Status:  Completed Specimen: Tissue Updated: 12/14/23 1327     Significant Indicator NEG     Source TISS     Site Left buttock wound     Culture Result Culture in progress.     Fungal Smear Results No fungal elements seen.    Narrative:      A Left buttock wound  Surgery Specimen    Anaerobic Culture [[id number]] Collected: 12/12/23 2004    Order Status: Completed Specimen: Tissue Updated: 12/14/23 1327     Significant Indicator NEG     Source TISS     Site Left buttock wound     Culture Result Culture in progress.    Narrative:      A Left buttock wound  Surgery Specimen    CULTURE TISSUE W/ GRM STAIN [[id number]]  (Abnormal) Collected: 12/12/23 2004    Order Status: Completed Specimen: Tissue Updated: 12/14/23 1327     Significant Indicator NEG     Source TISS     Site Left buttock wound     Culture Result Culture in progress.     Gram Stain Result Moderate WBCs.  Moderate Gram positive cocci.  Few Gram positive rods.  Few Gram negative rods.      Narrative:      A Left buttock wound  Surgery Specimen    CULTURE WOUND W/ GRAM STAIN [[id number]] Collected: 12/12/23 0523    Order Status: Completed Specimen: Wound from Perianal Updated: 12/14/23 1146     Significant Indicator NEG     Source WND     Site PERIANAL     Culture Result Moderate growth usual site specdific ade including Proteus  sp.       Gram Stain Result Many WBCs.  Many Gram positive cocci in chains.  Few Gram negative rods.      Narrative:      Swab received    GRAM STAIN [[id number]]  (Abnormal) Collected: 12/12/23 2004    Order Status: Completed Specimen: Tissue Updated: 12/13/23 1819     Significant Indicator .     Source TISS     Site Left buttock wound     Gram Stain Result Moderate WBCs.  Moderate Gram positive cocci.  Few Gram positive rods.  Few Gram negative rods.      Narrative:      A Left buttock wound  Surgery Specimen    Fungal Smear [[id number]] Collected: 12/12/23 2004    Order Status: Completed Specimen: Tissue Updated: 12/13/23 1819      "Significant Indicator NEG     Source TISS     Site Left buttock wound     Fungal Smear Results No fungal elements seen.    Narrative:      A Left buttock wound  Surgery Specimen    Acid Fast Stain [[id number]] Collected: 12/12/23 2004    Order Status: Completed Specimen: Tissue Updated: 12/13/23 1819     Significant Indicator NEG     Source TISS     Site Left buttock wound     AFB Smear Results No acid fast bacilli seen.    Narrative:      A Left buttock wound  Surgery Specimen    GRAM STAIN [[id number]] Collected: 12/12/23 0523    Order Status: Completed Specimen: Wound Updated: 12/12/23 1454     Significant Indicator .     Source WND     Site PERIANAL     Gram Stain Result Many WBCs.  Many Gram positive cocci in chains.  Few Gram negative rods.      Narrative:      Swab received    MRSA By PCR (Amp) [[id number]] Collected: 12/12/23 0523    Order Status: Completed Specimen: Respirate from Nares Updated: 12/12/23 1235     MRSA by PCR Negative    Narrative:      Collected By: [id number] RICARDO MAYBERRY    BLOOD CULTURE [[id number]] Collected: 12/11/23 1927    Order Status: Completed Specimen: Blood from Peripheral Updated: 12/12/23 0737     Significant Indicator NEG     Source BLD     Site PERIPHERAL     Culture Result No Growth  Note: Blood cultures are incubated for 5 days and  are monitored continuously.Positive blood cultures  are called to the RN and reported as soon as  they are identified.  Blood culture testing and Gram stain, if indicated, are  performed at [facility], [address] [place] [address].  Positive blood cultures are  sent to [facility], [address] [place] [address], for organism identification and  susceptibility testing.      Narrative:      Per Hospital Policy: Only change Specimen Src: to \"Line\" if  specified by physician order.  Left AC    BLOOD CULTURE [[id number]] Collected: 12/11/23 1927    Order Status: Completed Specimen: Blood from " "Peripheral Updated: 12/12/23 0737     Significant Indicator NEG     Source BLD     Site PERIPHERAL     Culture Result No Growth  Note: Blood cultures are incubated for 5 days and  are monitored continuously.Positive blood cultures  are called to the RN and reported as soon as  they are identified.  Blood culture testing and Gram stain, if indicated, are  performed at [facility], [address] [place] [address].  Positive blood cultures are  sent to [facility], [address] [place] [address], for organism identification and  susceptibility testing.      Narrative:      Per Hospital Policy: Only change Specimen Src: to \"Line\" if  specified by physician order.  Right Forearm/Arm    CoV-2, FLU A/B, and RSV by PCR (2-4 Hours CEPEverloopID) : Collect NP swab in VTM [[id number]] Collected: 12/11/23 1938    Order Status: Completed Specimen: Respirate Updated: 12/11/23 2101     Influenza virus A RNA Negative     Influenza virus B, PCR Negative     RSV, PCR Negative     SARS-CoV-2 by PCR NotDetected     Comment: [facility] providers: PLEASE REFER TO DE-ESCALATION AND RETESTING PROTOCOL  on [email or web address]    **The [employer] GeneXpert Xpress SARS-CoV-2 RT-PCR Test has been made  available for use under the Emergency Use Authorization (EUA) only.          SARS-CoV-2 Source NP Swab    URINALYSIS CULTURE, IF INDICATED [[id number]]  (Abnormal) Collected: 12/11/23 2014    Order Status: Completed Specimen: Urine, Suprapubic Updated: 12/11/23 2043     Color Yellow     Character Clear     Specific Gravity 1.025     Ph 6.0     Glucose Negative mg/dL      Ketones 40 mg/dL      Protein 100 mg/dL      Bilirubin Negative     Nitrite Negative     Leukocyte Esterase Negative     Occult Blood Small     Micro Urine Req Microscopic    Narrative:      Indication for culture:->Evaluation for sepsis without a  clear source of infection            Labs:     Estimated Creatinine Clearance: 154.4 mL/min (by C-G " "formula based on SCr of 0.51 mg/dL).  Recent Labs     23  012   WBC 12.0* 7.7   NEUTSPOLYS 81.80*  --      Recent Labs     23  012   BUN 17 11   CREATININE 0.76 0.51   ALBUMIN 3.8  --        Intake/Output Summary (Last 24 hours) at 2023 1335  Last data filed at 2023 0800  Gross per 24 hour   Intake 240 ml   Output 1500 ml   Net -1260 ml      /65   Pulse 60   Temp 36.7 °C (98 °F) (Temporal)   Resp 16   Ht 1.778 m (5' 10\")   Wt 102 kg (225 lb)   SpO2 94%  Temp (24hrs), Av.8 °C (98.2 °F), Min:36.7 °C (98 °F), Max:36.9 °C (98.4 °F)      List concerns for Vancomycin clearance:     Age;Obesity    Pharmacokinetics:       Two level kinetics:   Ke (hr ^-1): 0.0493  Half life: 14.1  Vd: Steady state Vd : 101.857  Calculated AUC: 497 mg·hr/L    Trough kinetics:   Recent Labs     23  0316 23  1159   VANCOTROUGH  --  16.4   VANCOPEAK 25.2  --        A/P:     -  Vancomycin dose: Change vancomycin from 1250 mg IV every 12 hours to 1750 mg IV every 18 hours    -  Next vancomycin level(s):   TBD      -  Predicted vancomycin AUC from two level test calculator: 464 mg·hr/L      -  Comments: Will continue to monitor and adjust regimen per pharmacy protocol.  Recheck levels for accumulation in a few days.    Kenny Bella, [facility]    "
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
## 2023-12-14 NOTE — PROGRESS NOTES
4 Eyes Skin Assessment Completed by Michael RN and Alfa RN.    Head WDL  Ears WDL  Nose WDL  Mouth WDL  Neck WDL  Breast/Chest WDL  Shoulder Blades WDL  Spine WDL  (R) Arm/Elbow/Hand WDL  (L) Arm/Elbow/Hand WDL  Abdomen Scar, LLQ  Groin WDL  Scrotum/Coccyx/Buttocks Dressing in placed  (R) Leg Discoloration  (L) Leg Discoloration  (R) Heel/Foot/Toe Discoloration  (L) Heel/Foot/Toe Discoloration, dressing in placed          Devices In Places Pulse Ox      Interventions In Place Heel Mepilex, Sacral Mepilex, Waffle Overlay, TAP System, Pillows, and Q2 Turns    Possible Skin Injury Yes    Pictures Uploaded Into Epic Yes  Wound Consult Placed Yes  RN Wound Prevention Protocol Ordered Yes

## 2023-12-15 ENCOUNTER — TELEPHONE (OUTPATIENT)
Dept: CARDIOLOGY | Facility: MEDICAL CENTER | Age: 73
End: 2023-12-15
Payer: MEDICARE

## 2023-12-15 ENCOUNTER — APPOINTMENT (OUTPATIENT)
Dept: RADIOLOGY | Facility: MEDICAL CENTER | Age: 73
DRG: 500 | End: 2023-12-15
Attending: INTERNAL MEDICINE
Payer: MEDICARE

## 2023-12-15 PROBLEM — S91.302A OPEN WOUND OF LEFT FOOT: Status: ACTIVE | Noted: 2023-12-15

## 2023-12-15 LAB
ANION GAP SERPL CALC-SCNC: 10 MMOL/L (ref 7–16)
BUN SERPL-MCNC: 16 MG/DL (ref 8–22)
CALCIUM SERPL-MCNC: 8.4 MG/DL (ref 8.4–10.2)
CHLORIDE SERPL-SCNC: 110 MMOL/L (ref 96–112)
CO2 SERPL-SCNC: 24 MMOL/L (ref 20–33)
CREAT SERPL-MCNC: 0.5 MG/DL (ref 0.5–1.4)
ERYTHROCYTE [DISTWIDTH] IN BLOOD BY AUTOMATED COUNT: 49.7 FL (ref 35.9–50)
GFR SERPLBLD CREATININE-BSD FMLA CKD-EPI: 107 ML/MIN/1.73 M 2
GLUCOSE SERPL-MCNC: 109 MG/DL (ref 65–99)
HCT VFR BLD AUTO: 36 % (ref 42–52)
HGB BLD-MCNC: 11.8 G/DL (ref 14–18)
MCH RBC QN AUTO: 33.5 PG (ref 27–33)
MCHC RBC AUTO-ENTMCNC: 32.8 G/DL (ref 32.3–36.5)
MCV RBC AUTO: 102.3 FL (ref 81.4–97.8)
PLATELET # BLD AUTO: 157 K/UL (ref 164–446)
PMV BLD AUTO: 8.9 FL (ref 9–12.9)
POTASSIUM SERPL-SCNC: 3.4 MMOL/L (ref 3.6–5.5)
RBC # BLD AUTO: 3.52 M/UL (ref 4.7–6.1)
SODIUM SERPL-SCNC: 144 MMOL/L (ref 135–145)
WBC # BLD AUTO: 5.3 K/UL (ref 4.8–10.8)

## 2023-12-15 PROCEDURE — 85027 COMPLETE CBC AUTOMATED: CPT

## 2023-12-15 PROCEDURE — 36415 COLL VENOUS BLD VENIPUNCTURE: CPT

## 2023-12-15 PROCEDURE — 80048 BASIC METABOLIC PNL TOTAL CA: CPT

## 2023-12-15 PROCEDURE — 700102 HCHG RX REV CODE 250 W/ 637 OVERRIDE(OP): Performed by: INTERNAL MEDICINE

## 2023-12-15 PROCEDURE — 700101 HCHG RX REV CODE 250: Performed by: INTERNAL MEDICINE

## 2023-12-15 PROCEDURE — 700111 HCHG RX REV CODE 636 W/ 250 OVERRIDE (IP): Performed by: INTERNAL MEDICINE

## 2023-12-15 PROCEDURE — 770001 HCHG ROOM/CARE - MED/SURG/GYN PRIV*

## 2023-12-15 PROCEDURE — 97605 NEG PRS WND THER DME<=50SQCM: CPT

## 2023-12-15 PROCEDURE — 700105 HCHG RX REV CODE 258: Performed by: INTERNAL MEDICINE

## 2023-12-15 PROCEDURE — 302098 PASTE RING (FLAT): Performed by: INTERNAL MEDICINE

## 2023-12-15 PROCEDURE — 99232 SBSQ HOSP IP/OBS MODERATE 35: CPT | Performed by: INTERNAL MEDICINE

## 2023-12-15 PROCEDURE — 94760 N-INVAS EAR/PLS OXIMETRY 1: CPT

## 2023-12-15 PROCEDURE — A9270 NON-COVERED ITEM OR SERVICE: HCPCS | Performed by: INTERNAL MEDICINE

## 2023-12-15 PROCEDURE — 99233 SBSQ HOSP IP/OBS HIGH 50: CPT | Performed by: INTERNAL MEDICINE

## 2023-12-15 RX ORDER — POLYETHYLENE GLYCOL 3350 17 G/17G
1 POWDER, FOR SOLUTION ORAL DAILY
Status: DISCONTINUED | OUTPATIENT
Start: 2023-12-15 | End: 2023-12-23 | Stop reason: HOSPADM

## 2023-12-15 RX ADMIN — BACLOFEN 20 MG: 10 TABLET ORAL at 11:36

## 2023-12-15 RX ADMIN — DOCUSATE SODIUM 100 MG: 100 CAPSULE, LIQUID FILLED ORAL at 05:31

## 2023-12-15 RX ADMIN — FERROUS SULFATE TAB 325 MG (65 MG ELEMENTAL FE) 325 MG: 325 (65 FE) TAB at 18:19

## 2023-12-15 RX ADMIN — MEROPENEM 500 MG: 500 INJECTION, POWDER, FOR SOLUTION INTRAVENOUS at 23:51

## 2023-12-15 RX ADMIN — BACLOFEN 20 MG: 10 TABLET ORAL at 07:00

## 2023-12-15 RX ADMIN — BACLOFEN 20 MG: 10 TABLET ORAL at 21:07

## 2023-12-15 RX ADMIN — Medication 10 MG: at 21:06

## 2023-12-15 RX ADMIN — MEROPENEM 500 MG: 500 INJECTION, POWDER, FOR SOLUTION INTRAVENOUS at 11:39

## 2023-12-15 RX ADMIN — ACETAMINOPHEN 650 MG: 325 TABLET ORAL at 21:09

## 2023-12-15 RX ADMIN — FERROUS SULFATE TAB 325 MG (65 MG ELEMENTAL FE) 325 MG: 325 (65 FE) TAB at 05:29

## 2023-12-15 RX ADMIN — Medication 400 MG: at 05:30

## 2023-12-15 RX ADMIN — BACLOFEN 20 MG: 10 TABLET ORAL at 17:00

## 2023-12-15 RX ADMIN — DOCUSATE SODIUM 100 MG: 100 CAPSULE, LIQUID FILLED ORAL at 18:19

## 2023-12-15 RX ADMIN — SENNOSIDES 8.6 MG: 8.6 TABLET, FILM COATED ORAL at 18:19

## 2023-12-15 RX ADMIN — VANCOMYCIN HYDROCHLORIDE 1750 MG: 5 INJECTION, POWDER, LYOPHILIZED, FOR SOLUTION INTRAVENOUS at 08:55

## 2023-12-15 RX ADMIN — MEROPENEM 500 MG: 500 INJECTION, POWDER, FOR SOLUTION INTRAVENOUS at 00:48

## 2023-12-15 RX ADMIN — SENNOSIDES 8.6 MG: 8.6 TABLET, FILM COATED ORAL at 05:30

## 2023-12-15 RX ADMIN — MEROPENEM 500 MG: 500 INJECTION, POWDER, FOR SOLUTION INTRAVENOUS at 18:23

## 2023-12-15 RX ADMIN — MEROPENEM 500 MG: 500 INJECTION, POWDER, FOR SOLUTION INTRAVENOUS at 05:28

## 2023-12-15 RX ADMIN — AMLODIPINE BESYLATE 5 MG: 5 TABLET ORAL at 05:31

## 2023-12-15 ASSESSMENT — ENCOUNTER SYMPTOMS
HEADACHES: 0
COUGH: 0
ABDOMINAL PAIN: 0
CONSTIPATION: 0
WEAKNESS: 0
INSOMNIA: 0
HEARTBURN: 0
NERVOUS/ANXIOUS: 0
DEPRESSION: 0
BLURRED VISION: 0
FEVER: 0
DIARRHEA: 0
SENSORY CHANGE: 0
SPEECH CHANGE: 0
CHILLS: 0
SHORTNESS OF BREATH: 0
VOMITING: 0
DIZZINESS: 0
MYALGIAS: 0
CLAUDICATION: 0
WEAKNESS: 1
PHOTOPHOBIA: 0

## 2023-12-15 ASSESSMENT — COGNITIVE AND FUNCTIONAL STATUS - GENERAL
DRESSING REGULAR LOWER BODY CLOTHING: TOTAL
SUGGESTED CMS G CODE MODIFIER DAILY ACTIVITY: CN
DAILY ACTIVITIY SCORE: 6
STANDING UP FROM CHAIR USING ARMS: TOTAL
DRESSING REGULAR UPPER BODY CLOTHING: TOTAL
HELP NEEDED FOR BATHING: TOTAL
WALKING IN HOSPITAL ROOM: TOTAL
MOVING FROM LYING ON BACK TO SITTING ON SIDE OF FLAT BED: UNABLE
TURNING FROM BACK TO SIDE WHILE IN FLAT BAD: UNABLE
EATING MEALS: TOTAL
CLIMB 3 TO 5 STEPS WITH RAILING: TOTAL
TOILETING: TOTAL
SUGGESTED CMS G CODE MODIFIER MOBILITY: CN
PERSONAL GROOMING: TOTAL
MOBILITY SCORE: 6
MOVING TO AND FROM BED TO CHAIR: UNABLE

## 2023-12-15 ASSESSMENT — PAIN DESCRIPTION - PAIN TYPE
TYPE: ACUTE PAIN
TYPE: ACUTE PAIN
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
## 2023-12-15 NOTE — TELEPHONE ENCOUNTER
MADDIE  Caller: Osvaldo Pate     Topic/issue: Patient will be in the hospital for 6 weeks due to an infection , he will be away from his [employer] machine during this time and would like to know if there are any recommendations from DR. Merino.    Callback Number: [phone]      Thank you   Maira HOWARD

## 2023-12-15 NOTE — CARE PLAN
The patient is Stable - Low risk of patient condition declining or worsening    Shift Goals  Clinical Goals: Pt able to tolerate Q2 turns, maintain skin integrity,continue IV Abx and wound care during this shift  Patient Goals: Able to rest comfortably  Family Goals: n/a    Progress made toward(s) clinical / shift goals:  Pr tolerated Q2 repositioning. Sacral dressing cleansed and changed. IV Abx continued per MAR. Pt denies any pain during this shift. Air loss mattress in place. Pt did not sustain any fall during this shift.    Patient is not progressing towards the following goals:

## 2023-12-15 NOTE — ASSESSMENT & PLAN NOTE
Left second toe with concern of infection as well  Unable to do MRI to rule out osteomyelitis because of plate on femur from his injury  Bone scan ordered and pending  If positive will discuss with orthopedic surgery    12/16: Concern for osteomyelitis in the foot. We have consulted orthopedic surgery, we appreciate further recommendations.  I have spoken to Dr. Paz who will come and evaluate the patient later today.    12/17: Orthopedic surgery evaluated the patient today and did not recommend surgical intervention at this time.  ID following the patient.  We have ordered PICC line placement.  The patient will also require placement upon discharge.  We appreciate further recommendations by case management.    12/19: PICC line to be placed today and pending placement.    12/20: PICC line placed, pending placement.    12/22: Continue antibiotics as per ID, pending placement.    12/23:  No acute changes, continue antibiotics

## 2023-12-15 NOTE — CARE PLAN
The patient is Stable - Low risk of patient condition declining or worsening    Shift Goals  Clinical Goals: Pain, rest, skin protocol  Patient Goals: Pain, sleep  Family Goals: n/a    Progress made toward(s) clinical / shift goals:  Patient makes no complaints of pain. He is compliant with repositioning. He understands plan of care and antibiotic therapy.    Patient is not progressing towards the following goals:

## 2023-12-15 NOTE — TELEPHONE ENCOUNTER
No recommendations from device clinic, we will let [facility] know he will be disconnected for that amount of time.

## 2023-12-15 NOTE — PROGRESS NOTES
4 Eyes Skin Assessment Completed by ZAIRA Earl and ZAIRA Michele.    Head WDL  Ears WDL  Nose WDL  Mouth WDL  Neck WDL  Breast/Chest WDL  Shoulder Blades WDL  Spine WDL  (R) Arm/Elbow/Hand WDL  (L) Arm/Elbow/Hand WDL  Abdomen Scar. LLQ Colostomy. Suprapubic catheter.  Groin Redness  Scrotum/Coccyx/Buttocks Scrotom - Redness and Swelling. Ischial - Redness, Excoriation and Tunneling. R buttocks - s/p I&D Bloody drainage and Tunneling.  (R) Leg Discoloration.  (L) Leg Medial, dressing in place.  (R) Heel/Foot/Toe Discoloration  (L) Heel/Foot/Toe 2nd toe, dressing in place. Heel, mepilex in place.          Devices In Places Pulse Ox      Interventions In Place Heel Mepilex, Sacral Mepilex, Heel Float Boots, TAP System, Pillows, Q2 Turns, and Low Air Loss Mattress    Possible Skin Injury Yes    Pictures Uploaded Into Epic Yes  Wound Consult Placed Yes  RN Wound Prevention Protocol Ordered Yes

## 2023-12-15 NOTE — PROGRESS NOTES
4 Eyes Skin Assessment Completed by ZAIRA Taylor and Morelia RN.    Head WDL  Ears WDL  Nose WDL  Mouth WDL  Neck WDL  Breast/Chest WDL  Shoulder Blades WDL  Spine WDL  (R) Arm/Elbow/Hand WDL  (L) Arm/Elbow/Hand WDL  Abdomen WDL  Groin WDL  Scrotum/Coccyx/Buttocks Dressing on sacral ulcer  (R) Leg Redness and scaring  (L) Leg Redness, Blanching, and Scar  (R) Heel/Foot/Toe Redness and Blanching  (L) Heel/Foot/Toe Redness and Blanching Peeling, Dressing on toe intact          Devices In Places Blood Pressure Cuff and Pulse Ox Suprapubic catheter, colostomy      Interventions In Place Sacral Mepilex, Heel Float Boots, Pillows, Q2 Turns, Low Air Loss Mattress, and Heels Loaded W/Pillows    Possible Skin Injury Yes    Pictures Uploaded Into Epic N/A  Wound Consult Placed Yes  RN Wound Prevention Protocol Ordered Yes

## 2023-12-15 NOTE — PROGRESS NOTES
Infectious Disease Progress Note    Author: Rosmery Crabtree M.D. Date & Time of service: 12/15/2023  1:16 PM    Chief Complaint:  Ischial osteomyelitis with abscess    Interval History:   73 y.o. quadriplegic male admitted 2023 for new right ischial decubitus with osteomyelitis   AF WBC 7.7 cultures neg Path +abscess  12/15 AF WBC 5.3 no new complaints Tolerating antibiotics well  Labs Reviewed and Medications Reviewed.    Review of Systems:  Review of Systems   Constitutional:  Negative for fever.   Neurological:  Positive for weakness.       Hemodynamics:  Temp (24hrs), Av.8 °C (98.2 °F), Min:36.7 °C (98 °F), Max:36.8 °C (98.3 °F)  Temperature: 36.7 °C (98 °F)  Pulse  Av.5  Min: 50  Max: 114   Blood Pressure : (!) 168/71 (rn aware )       Physical Exam:  Physical Exam  Vitals and nursing note reviewed.   Constitutional:       General: He is not in acute distress.     Appearance: He is not ill-appearing, toxic-appearing or diaphoretic.   Eyes:      General: No scleral icterus.     Extraocular Movements: Extraocular movements intact.      Pupils: Pupils are equal, round, and reactive to light.   Cardiovascular:      Rate and Rhythm: Normal rate.   Pulmonary:      Effort: Pulmonary effort is normal.   Abdominal:      General: There is no distension.   Musculoskeletal:         General: No swelling.      Comments: VAC right buttock  Swelling and erythema toe nearly resolved   Skin:     Coloration: Skin is not jaundiced.   Neurological:      Mental Status: He is alert. Mental status is at baseline.      Comments: quadriplegic         Meds:    Current Facility-Administered Medications:     docusate sodium    sennosides    amLODIPine    vancomycin    MD Alert...Vancomycin per Pharmacy    baclofen    ferrous sulfate    losartan    melatonin    acetaminophen    labetalol    ondansetron    ondansetron    magnesium oxide    meropenem    Labs:  Recent Labs     23  0127 12/15/23  0016   WBC 7.7 5.3    RBC 3.93* 3.52*   HEMOGLOBIN 13.4* 11.8*   HEMATOCRIT 40.7* 36.0*   .6* 102.3*   MCH 34.1* 33.5*   RDW 49.5 49.7   PLATELETCT 131* 157*   MPV 9.4 8.9*       Recent Labs     23  0127 12/15/23  0016   SODIUM 140 144   POTASSIUM 4.2 3.4*   CHLORIDE 108 110   CO2 17* 24   GLUCOSE 85 109*   BUN 11 16       Recent Labs     23  0127 12/15/23  0016   CREATININE 0.51 0.50         Imaging:  EC-ECHOCARDIOGRAM COMPLETE W/O CONT    Result Date: 2023  Transthoracic Echo Report Echocardiography Laboratory CONCLUSIONS Prior study on 22, compared to the report of the prior study, there has been no significant change. Normal left ventricular systolic function. The left ventricular ejection fraction is visually estimated to be 60%. Mild aortic insufficiency. Estimated right ventricular systolic pressure is 25 mmHg. Trivial pericardial effusion. NICHOLAS CASEY Exam Date:         2023                    17:40 Exam Location:     Inpatient Priority:          Routine Ordering Physician:        EMILY BROWNE Referring Physician:       [id number] Choe Sonographer:               Bety RUST Age:    73     Gender:    M MRN:    [id number] :    1950 BSA:    2.19   Ht (in):    70     Wt (lb):    225 Exam Type:     Complete Indications:     Shortness of breath ICD Codes:       786.05 CPT Codes:       32782 BP:   123    /   58     HR:   73 Technical Quality:       Fair MEASUREMENTS  (Male / Female) Normal Values 2D ECHO LV Diastolic Diameter PLAX        5.4 cm                4.2 - 5.9 / 3.9 - 5.3 cm LV Systolic Diameter PLAX         3.6 cm                2.1 - 4.0 cm IVS Diastolic Thickness           1.7 cm                LVPW Diastolic Thickness          1.6 cm                LVOT Diameter                     2.1 cm                Estimated LV Ejection Fraction    60 %                  LV Ejection Fraction MOD BP       59.2 %                >= 55  % LV Ejection Fraction MOD 4C       56.7 %                 LV Ejection Fraction MOD 2C       60.3 %                IVC Diameter                      1.9 cm                DOPPLER AV Peak Velocity                  2 m/s                 AV Peak Gradient                  16 mmHg               AV Mean Gradient                  7.7 mmHg              AI Pressure Half Time             517 ms                LVOT Peak Velocity                1.1 m/s               AV Area Cont Eq vti               1.8 cm2               TR Peak Velocity                  209 cm/s              PV Peak Velocity                  1 m/s                 PV Peak Gradient                  4.1 mmHg              RVOT Peak Velocity                1 m/s                 * Indicates values subject to auto-interpretation LV EF:  60    % FINDINGS Left Ventricle The left ventricle is normal in size .moderate concentric left ventricular hypertrophy. Normal left ventricular systolic function. The left ventricular ejection fraction is visually estimated to be 60%. Normal regional wall motion. Normal diastolic function. Right Ventricle The right ventricle is normal in size and systolic function. Right Atrium The right atrium is normal in size. Normal inferior vena cava size and inspiratory collapse. Left Atrium Mildly dilated left atrium. Left atrial volume index is  39.51mL/sq m. Mitral Valve Structurally normal mitral valve without significant stenosis. Trace mitral regurgitation. Aortic Valve Structurally normal aortic valve without significant stenosis. Mild aortic insufficiency. Mild aortic annular calcification. Tricuspid Valve Structurally normal tricuspid valve without significant stenosis. Trace tricuspid regurgitation.  Right atrial pressure is estimated to be 3 mmHg. Estimated right ventricular systolic pressure is 25 mmHg. Pulmonic Valve Structurally normal pulmonic valve without significant stenosis or regurgitation. Pericardium Trivial pericardial effusion. Aorta Normal aortic root for body  surface area. The ascending aorta diameter is  3.7cm. Jose Avendano M.D. (Electronically Signed) Final Date:     12 December 2023                 19:32    CT-ABDOMEN-PELVIS WITH    Result Date: 12/11/2023 12/11/2023 9:59 PM HISTORY/REASON FOR EXAM:  RLQ Pain; fever. TECHNIQUE/EXAM DESCRIPTION:   CT scan of the abdomen and pelvis with contrast. Contrast-enhanced helical scanning was obtained from the diaphragmatic domes through the pubic symphysis following the bolus administration of nonionic contrast without complication. 100 mL of Omnipaque 350 nonionic contrast was administered without complication. Low dose optimization technique was utilized for this CT exam including automated exposure control and adjustment of the mA and/or kV according to patient size. COMPARISON: No prior studies available. FINDINGS: Lower Chest: Unremarkable. Small pericardial effusion is noted. Liver: Hepatomegaly is seen. Spleen: Unremarkable. Pancreas: Unremarkable. Gallbladder: No calcified stones. Biliary: Nondilated. Adrenal glands: 3.0 cm left adrenal nodule is seen measuring 62 Hounsfield units. Kidneys: Unremarkable without hydronephrosis. Bowel: No obstruction or acute inflammation. The appendix appears within normal limits. Left lower quadrant colostomy is seen. Lymph nodes: No adenopathy. Vasculature: Atherosclerotic changes are seen including atherosclerotic coronary artery calcifications. Peritoneum: Unremarkable without ascites. Musculoskeletal: No acute or destructive process.There is right ischial decubitus ulcer with soft tissue gas extending to near the bone surface and tracking along the right peritoneum. Pelvis: No adenopathy or free fluid. The prostate appears enlarged. Suprapubic catheter is in place.     1.  Right ischial decubitus ulcer extending to bone with soft tissue gas tracking along the right perineum. Appearance suggesting osteomyelitis, consider component of necrotizing fasciitis as clinically  appropriate. 2.  Small pericardial effusion 3.  Left adrenal nodule, density on prior noncontrast CT demonstrates adenoma. 4.  Hepatomegaly 5.  Enlarged prostate, workup and evaluation for causes of prostate enlargement recommended as clinically appropriate. 6.  Atherosclerosis and atherosclerotic coronary artery disease These findings were discussed with the patient's clinician, Felix Newell, on 12/11/2023 10:44 PM.    DX-FOOT-2- LEFT    Result Date: 12/11/2023 12/11/2023 7:59 PM HISTORY/REASON FOR EXAM: Atraumatic Pain/Swelling/Deformity; left second TECHNIQUE/EXAM DESCRIPTION:  AP, and lateral views of the LEFT foot. COMPARISON:  April 1, 2022 FINDINGS: Soft tissue hyperostosis is seen in the medial leg, similar to prior study. There is no acute fracture, subluxation, or dislocation identified. There is bony resorption of the distal fifth metatarsal head and neck. No destructive osseous lesion is readily apparent.     1.  No acute traumatic bony injury. 2.  No destructive osseous process is easily identified, evaluation is limited however due to degeneration. Note that osteomyelitis can be difficult to identify on plain film and bone scan would offer improved diagnostic sensitivity as clinically appropriate.    DX-CHEST-PORTABLE (1 VIEW)    Result Date: 12/11/2023 12/11/2023 6:24 PM HISTORY/REASON FOR EXAM:  Fever TECHNIQUE/EXAM DESCRIPTION AND NUMBER OF VIEWS: Single portable view of the chest. COMPARISON: 6/17/2023 FINDINGS: Old right rib fractures No pulmonary infiltrates or consolidations are noted. No pleural effusion. No pneumothorax. Stable cardiopericardial silhouette.     1. No acute cardiopulmonary abnormalities are identified.      Micro:  Results       Procedure Component Value Units Date/Time    Fungal Culture [[id number]] Collected: 12/12/23 2004    Order Status: Completed Specimen: Tissue Updated: 12/14/23 6608     Significant Indicator NEG     Source TISS     Site Left buttock wound      Culture Result Culture in progress.     Fungal Smear Results No fungal elements seen.    Narrative:      A Left buttock wound  Surgery Specimen    Anaerobic Culture [[id number]] Collected: 12/12/23 2004    Order Status: Completed Specimen: Tissue Updated: 12/14/23 1745     Significant Indicator NEG     Source TISS     Site Left buttock wound     Culture Result Culture in progress.    Narrative:      A Left buttock wound  Surgery Specimen    CULTURE TISSUE W/ GRM STAIN [[id number]]  (Abnormal) Collected: 12/12/23 2004    Order Status: Completed Specimen: Tissue Updated: 12/14/23 1745     Significant Indicator POS     Source TISS     Site Left buttock wound     Culture Result -     Gram Stain Result Moderate WBCs.  Moderate Gram positive cocci.  Few Gram positive rods.  Few Gram negative rods.       Culture Result Group D Enterococcus species  Heavy growth        Proteus mirabilis  Moderate growth      Narrative:      A Left buttock wound  Surgery Specimen    AFB Culture [[id number]] Collected: 12/12/23 2004    Order Status: Completed Specimen: Tissue Updated: 12/14/23 1745     Significant Indicator NEG     Source TISS     Site Left buttock wound     Culture Result Culture in progress.     AFB Smear Results No acid fast bacilli seen.    Narrative:      A Left buttock wound  Surgery Specimen    CULTURE WOUND W/ GRAM STAIN [[id number]] Collected: 12/12/23 0523    Order Status: Completed Specimen: Wound from Perianal Updated: 12/14/23 1146     Significant Indicator NEG     Source WND     Site PERIANAL     Culture Result Moderate growth usual site specdific ade including Proteus  sp.       Gram Stain Result Many WBCs.  Many Gram positive cocci in chains.  Few Gram negative rods.      Narrative:      Swab received    GRAM STAIN [[id number]]  (Abnormal) Collected: 12/12/23 2004    Order Status: Completed Specimen: Tissue Updated: 12/13/23 1819     Significant Indicator .     Source TISS     Site Left buttock wound     Gram  Stain Result Moderate WBCs.  Moderate Gram positive cocci.  Few Gram positive rods.  Few Gram negative rods.      Narrative:      A Left buttock wound  Surgery Specimen    Fungal Smear [[id number]] Collected: 12/12/23 2004    Order Status: Completed Specimen: Tissue Updated: 12/13/23 1819     Significant Indicator NEG     Source TISS     Site Left buttock wound     Fungal Smear Results No fungal elements seen.    Narrative:      A Left buttock wound  Surgery Specimen    Acid Fast Stain [[id number]] Collected: 12/12/23 2004    Order Status: Completed Specimen: Tissue Updated: 12/13/23 1819     Significant Indicator NEG     Source TISS     Site Left buttock wound     AFB Smear Results No acid fast bacilli seen.    Narrative:      A Left buttock wound  Surgery Specimen    GRAM STAIN [[id number]] Collected: 12/12/23 0523    Order Status: Completed Specimen: Wound Updated: 12/12/23 1454     Significant Indicator .     Source WND     Site PERIANAL     Gram Stain Result Many WBCs.  Many Gram positive cocci in chains.  Few Gram negative rods.      Narrative:      Swab received    MRSA By PCR (Amp) [[id number]] Collected: 12/12/23 0523    Order Status: Completed Specimen: Respirate from Nares Updated: 12/12/23 1235     MRSA by PCR Negative    Narrative:      Collected By: [id number] RICARDO MAYBERRY    BLOOD CULTURE [[id number]] Collected: 12/11/23 1927    Order Status: Completed Specimen: Blood from Peripheral Updated: 12/12/23 0737     Significant Indicator NEG     Source BLD     Site PERIPHERAL     Culture Result No Growth  Note: Blood cultures are incubated for 5 days and  are monitored continuously.Positive blood cultures  are called to the RN and reported as soon as  they are identified.  Blood culture testing and Gram stain, if indicated, are  performed at [facility], [address] [place] [address].  Positive blood cultures are  sent to [facility], [address] [place] [address]  "[place], for organism identification and  susceptibility testing.      Narrative:      Per Hospital Policy: Only change Specimen Src: to \"Line\" if  specified by physician order.  Left AC    BLOOD CULTURE [[id number]] Collected: 12/11/23 1927    Order Status: Completed Specimen: Blood from Peripheral Updated: 12/12/23 0737     Significant Indicator NEG     Source BLD     Site PERIPHERAL     Culture Result No Growth  Note: Blood cultures are incubated for 5 days and  are monitored continuously.Positive blood cultures  are called to the RN and reported as soon as  they are identified.  Blood culture testing and Gram stain, if indicated, are  performed at [facility], [address] [place] [address].  Positive blood cultures are  sent to [facility], [address] [place] [address], for organism identification and  susceptibility testing.      Narrative:      Per Hospital Policy: Only change Specimen Src: to \"Line\" if  specified by physician order.  Right Forearm/Arm    CoV-2, FLU A/B, and RSV by PCR (2-4 Hours CEPappeningID) : Collect NP swab in VTM [[id number]] Collected: 12/11/23 1938    Order Status: Completed Specimen: Respirate Updated: 12/11/23 2101     Influenza virus A RNA Negative     Influenza virus B, PCR Negative     RSV, PCR Negative     SARS-CoV-2 by PCR NotDetected     Comment: [facility] providers: PLEASE REFER TO DE-ESCALATION AND RETESTING PROTOCOL  on [email or web address]    **The [employer] GeneXpert Xpress SARS-CoV-2 RT-PCR Test has been made  available for use under the Emergency Use Authorization (EUA) only.          SARS-CoV-2 Source NP Swab    URINALYSIS CULTURE, IF INDICATED [[id number]]  (Abnormal) Collected: 12/11/23 2014    Order Status: Completed Specimen: Urine, Suprapubic Updated: 12/11/23 2043     Color Yellow     Character Clear     Specific Gravity 1.025     Ph 6.0     Glucose Negative mg/dL      Ketones 40 mg/dL      Protein 100 mg/dL      Bilirubin " Negative     Nitrite Negative     Leukocyte Esterase Negative     Occult Blood Small     Micro Urine Req Microscopic    Narrative:      Indication for culture:->Evaluation for sepsis without a  clear source of infection            Assessment:  Active Hospital Problems    Diagnosis     *Osteomyelitis ([facility]) [M86.9]     SIRS (systemic inflammatory response syndrome) ([facility]) [R65.10]     Hyperglycemia [R73.9]     Pericardial effusion [I31.39]     Chronic incomplete quadriplegia ([facility]) [G82.50]     Essential hypertension [I10]     Paroxysmal atrial flutter ([facility]) [I48.92]    Right ischial osteomyelitis  -s/p debridement necrotic muscle and bone 12/12  -gram stain polymicrobial :GPC, GPR, GNR  Recent (3/23) :Left leg osteomyelitis-Polymicrobial wound infection-MRSA and ESBL Proteus  Left second toe with dorsal ulceration and erythema  C5-C7 paraplegia  Chronic decubitus ulcers incl sacaral  History of sacral osteomyelitis with abscess     Allergic to sulfa     Plan:  Recommend Ortho eval left foot  FU operative cultures-enterococcus and Proteus, likely will be ESBL  Continue meropenem   Continue vanco for now-although negative MRSA screen and gram stain did not show GPC in clusters wound culture of left toe +MRSA 9/13/2023 (resistant to doxy)  Anticipate 6 week course  Final antibiotic recommendations per culture results and clinical course    PICC yes  Wound care

## 2023-12-16 LAB
ANION GAP SERPL CALC-SCNC: 8 MMOL/L (ref 7–16)
BACTERIA BLD CULT: NORMAL
BACTERIA BLD CULT: NORMAL
BACTERIA TISS AEROBE CULT: ABNORMAL
BACTERIA WND AEROBE CULT: NORMAL
BUN SERPL-MCNC: 14 MG/DL (ref 8–22)
CALCIUM SERPL-MCNC: 8.7 MG/DL (ref 8.4–10.2)
CHLORIDE SERPL-SCNC: 108 MMOL/L (ref 96–112)
CO2 SERPL-SCNC: 25 MMOL/L (ref 20–33)
CREAT SERPL-MCNC: 0.54 MG/DL (ref 0.5–1.4)
ERYTHROCYTE [DISTWIDTH] IN BLOOD BY AUTOMATED COUNT: 49.9 FL (ref 35.9–50)
GFR SERPLBLD CREATININE-BSD FMLA CKD-EPI: 105 ML/MIN/1.73 M 2
GLUCOSE SERPL-MCNC: 94 MG/DL (ref 65–99)
GRAM STN SPEC: ABNORMAL
GRAM STN SPEC: NORMAL
HCT VFR BLD AUTO: 36.3 % (ref 42–52)
HGB BLD-MCNC: 11.8 G/DL (ref 14–18)
MCH RBC QN AUTO: 34 PG (ref 27–33)
MCHC RBC AUTO-ENTMCNC: 32.5 G/DL (ref 32.3–36.5)
MCV RBC AUTO: 104.6 FL (ref 81.4–97.8)
PLATELET # BLD AUTO: 156 K/UL (ref 164–446)
PMV BLD AUTO: 8.6 FL (ref 9–12.9)
POTASSIUM SERPL-SCNC: 4.4 MMOL/L (ref 3.6–5.5)
RBC # BLD AUTO: 3.47 M/UL (ref 4.7–6.1)
SIGNIFICANT IND 70042: ABNORMAL
SIGNIFICANT IND 70042: NORMAL
SITE SITE: ABNORMAL
SITE SITE: NORMAL
SODIUM SERPL-SCNC: 141 MMOL/L (ref 135–145)
SOURCE SOURCE: ABNORMAL
SOURCE SOURCE: NORMAL
WBC # BLD AUTO: 5.4 K/UL (ref 4.8–10.8)

## 2023-12-16 PROCEDURE — 85027 COMPLETE CBC AUTOMATED: CPT

## 2023-12-16 PROCEDURE — 700105 HCHG RX REV CODE 258: Performed by: INTERNAL MEDICINE

## 2023-12-16 PROCEDURE — 700101 HCHG RX REV CODE 250: Performed by: INTERNAL MEDICINE

## 2023-12-16 PROCEDURE — 770001 HCHG ROOM/CARE - MED/SURG/GYN PRIV*

## 2023-12-16 PROCEDURE — 700102 HCHG RX REV CODE 250 W/ 637 OVERRIDE(OP): Performed by: INTERNAL MEDICINE

## 2023-12-16 PROCEDURE — 94760 N-INVAS EAR/PLS OXIMETRY 1: CPT

## 2023-12-16 PROCEDURE — 80048 BASIC METABOLIC PNL TOTAL CA: CPT

## 2023-12-16 PROCEDURE — A9270 NON-COVERED ITEM OR SERVICE: HCPCS | Performed by: INTERNAL MEDICINE

## 2023-12-16 PROCEDURE — 36415 COLL VENOUS BLD VENIPUNCTURE: CPT

## 2023-12-16 PROCEDURE — 99233 SBSQ HOSP IP/OBS HIGH 50: CPT | Performed by: INTERNAL MEDICINE

## 2023-12-16 PROCEDURE — 700111 HCHG RX REV CODE 636 W/ 250 OVERRIDE (IP): Performed by: INTERNAL MEDICINE

## 2023-12-16 RX ADMIN — SENNOSIDES 8.6 MG: 8.6 TABLET, FILM COATED ORAL at 05:24

## 2023-12-16 RX ADMIN — DOCUSATE SODIUM 100 MG: 100 CAPSULE, LIQUID FILLED ORAL at 05:24

## 2023-12-16 RX ADMIN — Medication 400 MG: at 05:24

## 2023-12-16 RX ADMIN — BACLOFEN 20 MG: 10 TABLET ORAL at 12:12

## 2023-12-16 RX ADMIN — BACLOFEN 20 MG: 10 TABLET ORAL at 17:11

## 2023-12-16 RX ADMIN — ACETAMINOPHEN 650 MG: 325 TABLET ORAL at 12:18

## 2023-12-16 RX ADMIN — DOCUSATE SODIUM 100 MG: 100 CAPSULE, LIQUID FILLED ORAL at 17:11

## 2023-12-16 RX ADMIN — BACLOFEN 20 MG: 10 TABLET ORAL at 20:40

## 2023-12-16 RX ADMIN — LOSARTAN POTASSIUM 50 MG: 50 TABLET, FILM COATED ORAL at 00:19

## 2023-12-16 RX ADMIN — MEROPENEM 500 MG: 500 INJECTION, POWDER, FOR SOLUTION INTRAVENOUS at 05:23

## 2023-12-16 RX ADMIN — MEROPENEM 500 MG: 500 INJECTION, POWDER, FOR SOLUTION INTRAVENOUS at 12:15

## 2023-12-16 RX ADMIN — VANCOMYCIN HYDROCHLORIDE 1750 MG: 5 INJECTION, POWDER, LYOPHILIZED, FOR SOLUTION INTRAVENOUS at 20:18

## 2023-12-16 RX ADMIN — FERROUS SULFATE TAB 325 MG (65 MG ELEMENTAL FE) 325 MG: 325 (65 FE) TAB at 17:11

## 2023-12-16 RX ADMIN — FERROUS SULFATE TAB 325 MG (65 MG ELEMENTAL FE) 325 MG: 325 (65 FE) TAB at 05:24

## 2023-12-16 RX ADMIN — POLYETHYLENE GLYCOL 3350 1 PACKET: 17 POWDER, FOR SOLUTION ORAL at 10:00

## 2023-12-16 RX ADMIN — AMLODIPINE BESYLATE 5 MG: 5 TABLET ORAL at 05:23

## 2023-12-16 RX ADMIN — BACLOFEN 20 MG: 10 TABLET ORAL at 08:22

## 2023-12-16 RX ADMIN — SENNOSIDES 8.6 MG: 8.6 TABLET, FILM COATED ORAL at 17:11

## 2023-12-16 RX ADMIN — Medication 10 MG: at 20:40

## 2023-12-16 RX ADMIN — LOSARTAN POTASSIUM 50 MG: 50 TABLET, FILM COATED ORAL at 17:11

## 2023-12-16 RX ADMIN — VANCOMYCIN HYDROCHLORIDE 1750 MG: 5 INJECTION, POWDER, LYOPHILIZED, FOR SOLUTION INTRAVENOUS at 02:03

## 2023-12-16 RX ADMIN — MEROPENEM 500 MG: 500 INJECTION, POWDER, FOR SOLUTION INTRAVENOUS at 17:11

## 2023-12-16 ASSESSMENT — ENCOUNTER SYMPTOMS
BLURRED VISION: 0
SHORTNESS OF BREATH: 0
CHILLS: 0
WEAKNESS: 1
HEARTBURN: 0
CONSTIPATION: 0
WEAKNESS: 0
FEVER: 0
INSOMNIA: 0
VOMITING: 0
DIARRHEA: 0
PHOTOPHOBIA: 0
SENSORY CHANGE: 0
DEPRESSION: 0
CLAUDICATION: 0
NERVOUS/ANXIOUS: 0
DIZZINESS: 0
SPEECH CHANGE: 0
ABDOMINAL PAIN: 0
COUGH: 0
MYALGIAS: 0
HEADACHES: 0

## 2023-12-16 ASSESSMENT — PAIN DESCRIPTION - PAIN TYPE
TYPE: ACUTE PAIN
TYPE: ACUTE PAIN

## 2023-12-16 NOTE — PROGRESS NOTES
Assumed care. Patient awake and alert. Wound vac alarming with full cannister. Cannister and irrigation was replaced. Wound vac suction was intact and operating properly.

## 2023-12-16 NOTE — CARE PLAN
The patient is Stable - Low risk of patient condition declining or worsening    Shift Goals  Clinical Goals: Pain, Antibiotic therapy, skin protocol  Patient Goals: Rest, Comfort  Family Goals: n/a    Progress made toward(s) clinical / shift goals:  Wound vac with irrigation is in place and operating properly. Drainage has progressed from serosanguineous to nearly clear.     Patient is not progressing towards the following goals:

## 2023-12-16 NOTE — PROGRESS NOTES
Infectious Disease Progress Note    Author: Manuel Valencia M.D. Date & Time of service: 2023  7:46 AM    Chief Complaint:  Ischial osteomyelitis with abscess    Interval History:   73 y.o. quadriplegic male admitted 2023 for new right ischial decubitus with osteomyelitis   AF WBC 7.7 cultures neg Path +abscess  12/15 AF WBC 5.3 no new complaints Tolerating antibiotics well   patient remains afebrile, count is 5.4, tolerating antimicrobials, cultures as below, creatinine 0.54, vancomycin trough 16.4    Labs Reviewed and Medications Reviewed.    Review of Systems:  Review of Systems   Constitutional:  Negative for fever.   Neurological:  Positive for weakness.       Hemodynamics:  Temp (24hrs), Av.7 °C (98 °F), Min:36.5 °C (97.7 °F), Max:36.8 °C (98.3 °F)  Temperature: 36.5 °C (97.7 °F)  Pulse  Av.3  Min: 50  Max: 114   Blood Pressure : (!) 168/83 (Rn aware)       Physical Exam:  Physical Exam  Vitals and nursing note reviewed.   Constitutional:       General: He is not in acute distress.     Appearance: He is not ill-appearing, toxic-appearing or diaphoretic.   Eyes:      General: No scleral icterus.     Extraocular Movements: Extraocular movements intact.      Pupils: Pupils are equal, round, and reactive to light.   Cardiovascular:      Rate and Rhythm: Normal rate.   Pulmonary:      Effort: Pulmonary effort is normal.   Abdominal:      General: There is no distension.   Musculoskeletal:         General: No swelling.      Comments: VAC right buttock  Swelling and erythema toe nearly resolved   Skin:     Coloration: Skin is not jaundiced.   Neurological:      Mental Status: He is alert. Mental status is at baseline.      Comments: quadriplegic         Meds:    Current Facility-Administered Medications:     polyethylene glycol/lytes    docusate sodium    sennosides    amLODIPine    vancomycin    MD Alert...Vancomycin per Pharmacy    baclofen    ferrous sulfate    losartan     melatonin    acetaminophen    labetalol    ondansetron    ondansetron    magnesium oxide    meropenem    Labs:  Recent Labs     12/15/23  0016 23  0216 23  0235   WBC 5.3 5.4 5.2   RBC 3.52* 3.47* 3.56*   HEMOGLOBIN 11.8* 11.8* 12.1*   HEMATOCRIT 36.0* 36.3* 36.3*   .3* 104.6* 102.0*   MCH 33.5* 34.0* 34.0*   RDW 49.7 49.9 48.7   PLATELETCT 157* 156* 170   MPV 8.9* 8.6* 9.0     Recent Labs     12/15/23  0016 236 23  0235   SODIUM 144 141 144   POTASSIUM 3.4* 4.4 4.0   CHLORIDE 110 108 110   CO2 24 25 24   GLUCOSE 109* 94 97   BUN 16 14 15     Recent Labs     12/15/23  0016 236 23  0235   ALBUMIN  --   --  2.8*   TBILIRUBIN  --   --  0.3   ALKPHOSPHAT  --   --  62   TOTPROTEIN  --   --  5.8*   ALTSGPT  --   --  14   ASTSGOT  --   --  15   CREATININE 0.50 0.54 0.39*       Imaging:  EC-ECHOCARDIOGRAM COMPLETE W/O CONT    Result Date: 2023  Transthoracic Echo Report Echocardiography Laboratory CONCLUSIONS Prior study on 22, compared to the report of the prior study, there has been no significant change. Normal left ventricular systolic function. The left ventricular ejection fraction is visually estimated to be 60%. Mild aortic insufficiency. Estimated right ventricular systolic pressure is 25 mmHg. Trivial pericardial effusion. CASEY NICHOLAS Exam Date:         2023                    17:40 Exam Location:     Inpatient Priority:          Routine Ordering Physician:        EMILY BROWNE Referring Physician:       [id number] Choe Sonographer:               Bety RUST Age:    73     Gender:    M MRN:    [id number] :    1950 BSA:    2.19   Ht (in):    70     Wt (lb):    225 Exam Type:     Complete Indications:     Shortness of breath ICD Codes:       786.05 CPT Codes:       13102 BP:   123    /   58     HR:   73 Technical Quality:       Fair MEASUREMENTS  (Male / Female) Normal Values 2D ECHO LV Diastolic Diameter PLAX        5.4 cm                 4.2 - 5.9 / 3.9 - 5.3 cm LV Systolic Diameter PLAX         3.6 cm                2.1 - 4.0 cm IVS Diastolic Thickness           1.7 cm                LVPW Diastolic Thickness          1.6 cm                LVOT Diameter                     2.1 cm                Estimated LV Ejection Fraction    60 %                  LV Ejection Fraction MOD BP       59.2 %                >= 55  % LV Ejection Fraction MOD 4C       56.7 %                LV Ejection Fraction MOD 2C       60.3 %                IVC Diameter                      1.9 cm                DOPPLER AV Peak Velocity                  2 m/s                 AV Peak Gradient                  16 mmHg               AV Mean Gradient                  7.7 mmHg              AI Pressure Half Time             517 ms                LVOT Peak Velocity                1.1 m/s               AV Area Cont Eq vti               1.8 cm2               TR Peak Velocity                  209 cm/s              PV Peak Velocity                  1 m/s                 PV Peak Gradient                  4.1 mmHg              RVOT Peak Velocity                1 m/s                 * Indicates values subject to auto-interpretation LV EF:  60    % FINDINGS Left Ventricle The left ventricle is normal in size .moderate concentric left ventricular hypertrophy. Normal left ventricular systolic function. The left ventricular ejection fraction is visually estimated to be 60%. Normal regional wall motion. Normal diastolic function. Right Ventricle The right ventricle is normal in size and systolic function. Right Atrium The right atrium is normal in size. Normal inferior vena cava size and inspiratory collapse. Left Atrium Mildly dilated left atrium. Left atrial volume index is  39.51mL/sq m. Mitral Valve Structurally normal mitral valve without significant stenosis. Trace mitral regurgitation. Aortic Valve Structurally normal aortic valve without significant stenosis. Mild aortic  insufficiency. Mild aortic annular calcification. Tricuspid Valve Structurally normal tricuspid valve without significant stenosis. Trace tricuspid regurgitation.  Right atrial pressure is estimated to be 3 mmHg. Estimated right ventricular systolic pressure is 25 mmHg. Pulmonic Valve Structurally normal pulmonic valve without significant stenosis or regurgitation. Pericardium Trivial pericardial effusion. Aorta Normal aortic root for body surface area. The ascending aorta diameter is  3.7cm. Jose Avendano M.D. (Electronically Signed) Final Date:     12 December 2023                 19:32    CT-ABDOMEN-PELVIS WITH    Result Date: 12/11/2023 12/11/2023 9:59 PM HISTORY/REASON FOR EXAM:  RLQ Pain; fever. TECHNIQUE/EXAM DESCRIPTION:   CT scan of the abdomen and pelvis with contrast. Contrast-enhanced helical scanning was obtained from the diaphragmatic domes through the pubic symphysis following the bolus administration of nonionic contrast without complication. 100 mL of Omnipaque 350 nonionic contrast was administered without complication. Low dose optimization technique was utilized for this CT exam including automated exposure control and adjustment of the mA and/or kV according to patient size. COMPARISON: No prior studies available. FINDINGS: Lower Chest: Unremarkable. Small pericardial effusion is noted. Liver: Hepatomegaly is seen. Spleen: Unremarkable. Pancreas: Unremarkable. Gallbladder: No calcified stones. Biliary: Nondilated. Adrenal glands: 3.0 cm left adrenal nodule is seen measuring 62 Hounsfield units. Kidneys: Unremarkable without hydronephrosis. Bowel: No obstruction or acute inflammation. The appendix appears within normal limits. Left lower quadrant colostomy is seen. Lymph nodes: No adenopathy. Vasculature: Atherosclerotic changes are seen including atherosclerotic coronary artery calcifications. Peritoneum: Unremarkable without ascites. Musculoskeletal: No acute or destructive process.There is  right ischial decubitus ulcer with soft tissue gas extending to near the bone surface and tracking along the right peritoneum. Pelvis: No adenopathy or free fluid. The prostate appears enlarged. Suprapubic catheter is in place.     1.  Right ischial decubitus ulcer extending to bone with soft tissue gas tracking along the right perineum. Appearance suggesting osteomyelitis, consider component of necrotizing fasciitis as clinically appropriate. 2.  Small pericardial effusion 3.  Left adrenal nodule, density on prior noncontrast CT demonstrates adenoma. 4.  Hepatomegaly 5.  Enlarged prostate, workup and evaluation for causes of prostate enlargement recommended as clinically appropriate. 6.  Atherosclerosis and atherosclerotic coronary artery disease These findings were discussed with the patient's clinician, Felix Newell, on 12/11/2023 10:44 PM.    DX-FOOT-2- LEFT    Result Date: 12/11/2023 12/11/2023 7:59 PM HISTORY/REASON FOR EXAM: Atraumatic Pain/Swelling/Deformity; left second TECHNIQUE/EXAM DESCRIPTION:  AP, and lateral views of the LEFT foot. COMPARISON:  April 1, 2022 FINDINGS: Soft tissue hyperostosis is seen in the medial leg, similar to prior study. There is no acute fracture, subluxation, or dislocation identified. There is bony resorption of the distal fifth metatarsal head and neck. No destructive osseous lesion is readily apparent.     1.  No acute traumatic bony injury. 2.  No destructive osseous process is easily identified, evaluation is limited however due to degeneration. Note that osteomyelitis can be difficult to identify on plain film and bone scan would offer improved diagnostic sensitivity as clinically appropriate.    DX-CHEST-PORTABLE (1 VIEW)    Result Date: 12/11/2023 12/11/2023 6:24 PM HISTORY/REASON FOR EXAM:  Fever TECHNIQUE/EXAM DESCRIPTION AND NUMBER OF VIEWS: Single portable view of the chest. COMPARISON: 6/17/2023 FINDINGS: Old right rib fractures No pulmonary infiltrates  "or consolidations are noted. No pleural effusion. No pneumothorax. Stable cardiopericardial silhouette.     1. No acute cardiopulmonary abnormalities are identified.      Micro:  Results       Procedure Component Value Units Date/Time    BLOOD CULTURE [[id number]] Collected: 12/11/23 1927    Order Status: Completed Specimen: Blood from Peripheral Updated: 12/16/23 2017     Significant Indicator NEG     Source BLD     Site PERIPHERAL     Culture Result No growth after 5 days of incubation.  Blood culture testing and Gram stain, if indicated, are  performed at [facility], [address] [place] [address].  Positive blood cultures are  sent to [facility], [address] [place] [address], for organism identification and  susceptibility testing.      Narrative:      Per Hospital Policy: Only change Specimen Src: to \"Line\" if  specified by physician order.  Right Forearm/Arm    BLOOD CULTURE [[id number]] Collected: 12/11/23 1927    Order Status: Completed Specimen: Blood from Peripheral Updated: 12/16/23 2017     Significant Indicator NEG     Source BLD     Site PERIPHERAL     Culture Result No growth after 5 days of incubation.  Blood culture testing and Gram stain, if indicated, are  performed at [facility], [address] [place] [address].  Positive blood cultures are  sent to [facility], [address] [place] [address], for organism identification and  susceptibility testing.      Narrative:      Per Hospital Policy: Only change Specimen Src: to \"Line\" if  specified by physician order.  Left AC    AFB Culture [[id number]] Collected: 12/12/23 2004    Order Status: Completed Specimen: Tissue Updated: 12/16/23 1325     Significant Indicator NEG     Source TISS     Site Left buttock wound     Culture Result Culture in progress.     AFB Smear Results No acid fast bacilli seen.    Narrative:      A Left buttock wound  Surgery " Specimen    Fungal Culture [[id number]] Collected: 12/12/23 2004    Order Status: Completed Specimen: Tissue Updated: 12/16/23 1325     Significant Indicator NEG     Source TISS     Site Left buttock wound     Culture Result Culture in progress.     Fungal Smear Results No fungal elements seen.    Narrative:      A Left buttock wound  Surgery Specimen    Anaerobic Culture [[id number]]  (Abnormal) Collected: 12/12/23 2004    Order Status: Completed Specimen: Tissue Updated: 12/16/23 1325     Significant Indicator POS     Source TISS     Site Left buttock wound     Culture Result -      Prevotella bivia  Moderate growth      Narrative:      A Left buttock wound  Surgery Specimen    CULTURE TISSUE W/ GRM STAIN [[id number]]  (Abnormal)  (Susceptibility) Collected: 12/12/23 2004    Order Status: Completed Specimen: Tissue Updated: 12/16/23 1325     Significant Indicator POS     Source TISS     Site Left buttock wound     Culture Result -     Gram Stain Result Moderate WBCs.  Moderate Gram positive cocci.  Few Gram positive rods.  Few Gram negative rods.       Culture Result Enterococcus faecalis  Heavy growth        Proteus mirabilis ESBL  Moderate growth  Extended Spectrum Beta-lactamase (ESBL) isolated.  ESBL's may be clinically resistant to therapy with  Penicillins,Cephalosporins or Aztreonam despite  apparent in vitro susceptibility to some of these agents.  The patient requires contact isolation.  Please contact pharmacy or an Infectious Disease Specialist  if you have any questions about appropriate therapy.      Narrative:      A Left buttock wound  Surgery Specimen    Susceptibility       Enterococcus faecalis (1)       Antibiotic Interpretation Microscan   Method Status    Daptomycin Sensitive 2 mcg/mL ROSE Final    Gent Synergy Sensitive <=500 mcg/mL ROSE Final    Penicillin Sensitive 0.5 mcg/mL ROSE Final    Vancomycin Sensitive 1 mcg/mL ROSE Final    Ampicillin Sensitive <=2 mcg/mL ROSE Final              Proteus  mirabilis esbl (2)       Antibiotic Interpretation Microscan   Method Status    Ciprofloxacin Resistant 1 mcg/mL ROSE Final    Levofloxacin Resistant 2 mcg/mL ROSE Final    Ampicillin/sulbactam Sensitive <=4/2 mcg/mL ROSE Final    Amoxicillin/CA Sensitive <=8/4 mcg/mL ROSE Final    Ampicillin Resistant >16 mcg/mL ROSE Final    Tobramycin Sensitive <=2 mcg/mL ROSE Final    Ceftriaxone Resistant >32 mcg/mL ROSE Final    Cefazolin Resistant >16 mcg/mL ROSE Final    Cefepime Resistant >16 mcg/mL ROSE Final    Cefuroxime Resistant >16 mcg/mL ROSE Final    Ertapenem Sensitive <=0.5 mcg/mL ROSE Final    Gentamicin Sensitive <=2 mcg/mL ROSE Final    Minocycline Resistant >8 mcg/mL ROSE Final    Moxifloxacin Resistant >4 mcg/mL ROSE Final    Pip/Tazobactam Sensitive <=8 mcg/mL ROSE Final    Trimeth/Sulfa Resistant >2/38 mcg/mL ROSE Final                       CULTURE WOUND W/ GRAM STAIN [[id number]] Collected: 12/12/23 0523    Order Status: Completed Specimen: Wound from Perianal Updated: 12/16/23 1037     Significant Indicator NEG     Source WND     Site PERIANAL     Culture Result Moderate growth usual site specdific ade including Proteus  sp. and Group D Enterococcus sp.       Gram Stain Result Many WBCs.  Many Gram positive cocci in chains.  Few Gram negative rods.      Narrative:      Swab received    GRAM STAIN [[id number]]  (Abnormal) Collected: 12/12/23 2004    Order Status: Completed Specimen: Tissue Updated: 12/13/23 1819     Significant Indicator .     Source TISS     Site Left buttock wound     Gram Stain Result Moderate WBCs.  Moderate Gram positive cocci.  Few Gram positive rods.  Few Gram negative rods.      Narrative:      A Left buttock wound  Surgery Specimen    Fungal Smear [[id number]] Collected: 12/12/23 2004    Order Status: Completed Specimen: Tissue Updated: 12/13/23 1819     Significant Indicator NEG     Source TISS     Site Left buttock wound     Fungal Smear Results No fungal elements seen.    Narrative:      A  Left buttock wound  Surgery Specimen    Acid Fast Stain [[id number]] Collected: 12/12/23 2004    Order Status: Completed Specimen: Tissue Updated: 12/13/23 1819     Significant Indicator NEG     Source TISS     Site Left buttock wound     AFB Smear Results No acid fast bacilli seen.    Narrative:      A Left buttock wound  Surgery Specimen    GRAM STAIN [[id number]] Collected: 12/12/23 0523    Order Status: Completed Specimen: Wound Updated: 12/12/23 1454     Significant Indicator .     Source WND     Site PERIANAL     Gram Stain Result Many WBCs.  Many Gram positive cocci in chains.  Few Gram negative rods.      Narrative:      Swab received    MRSA By PCR (Amp) [[id number]] Collected: 12/12/23 0523    Order Status: Completed Specimen: Respirate from Nares Updated: 12/12/23 1235     MRSA by PCR Negative    Narrative:      Collected By: [id number] RICARDO MAYBERRY    CoV-2, FLU A/B, and RSV by PCR (2-4 Hours CEPHEID) : Collect NP swab in VTM [[id number]] Collected: 12/11/23 1938    Order Status: Completed Specimen: Respirate Updated: 12/11/23 2101     Influenza virus A RNA Negative     Influenza virus B, PCR Negative     RSV, PCR Negative     SARS-CoV-2 by PCR NotDetected     Comment: [facility] providers: PLEASE REFER TO DE-ESCALATION AND RETESTING PROTOCOL  on [email or web address]    **The [employer] GeneXpert Xpress SARS-CoV-2 RT-PCR Test has been made  available for use under the Emergency Use Authorization (EUA) only.          SARS-CoV-2 Source NP Swab    URINALYSIS CULTURE, IF INDICATED [[id number]]  (Abnormal) Collected: 12/11/23 2014    Order Status: Completed Specimen: Urine, Suprapubic Updated: 12/11/23 2043     Color Yellow     Character Clear     Specific Gravity 1.025     Ph 6.0     Glucose Negative mg/dL      Ketones 40 mg/dL      Protein 100 mg/dL      Bilirubin Negative     Nitrite Negative     Leukocyte Esterase Negative     Occult Blood Small     Micro Urine Req Microscopic    Narrative:      Indication for  culture:->Evaluation for sepsis without a  clear source of infection            Assessment/Hospital course:  Right ischial osteomyelitis  -s/p debridement necrotic muscle and bone 12/12  -gram stain polymicrobial :GPC, GPR, GNR  Recent (3/23) left leg osteomyelitis-Polymicrobial wound infection-MRSA and ESBL Proteus  Left second toe with dorsal ulceration and erythema  C5-C7 paraplegia  Chronic decubitus ulcers incl sacaral  History of sacral osteomyelitis with abscess  Allergic to sulfa     Plan:  -Operative cultures 12/12 ESBL Proteus (also fluoroquinolone and Bactrim resistant), pan susceptible Enterococcus, Prevotella   -Continue IV meropenem 500 mg every 6 hours + IV vancomycin for now  -Patient already has diverting colostomy in place  -Noted erythematous left second toe with dorsal ulceration.  Patient unable to get MRI due to presence of hardware in leg, bone scan with increased activity first and third toes.  Orthopedics has evaluated and recommended no surgical intervention    Disposition: Anticipate discharge to [facility] where he will complete 6 weeks of IV antibiotics -recommend IV ertapenem 1 g every 24 hours + IV daptomycin  8 mg/Kg every 24 hours through 1/22/2024  At least weekly CBC with differential, CMP, CPK while on IV antibiotics  If facility refuses daptomycin due to cost, then okay to use vancomycin in its place only if the facility has access to trained pharmacists to appropriately dose and monitor the vancomycin  Need for PICC line: Yes, okay to place    Discussed with Dr. Zaynab Meier.  ID will sign off.  Patient at risk for additional morbidity from chronic antibiotic use.  ID clinic follow-up in 2 to 3 weeks.  Okay to schedule with NP Shell

## 2023-12-16 NOTE — TELEPHONE ENCOUNTER
Phone Number Called: [phone]    Call outcome: Left detailed message for patient. Informed to call back with any additional questions.    Message: Called to inform patient that we have no recommendations.

## 2023-12-16 NOTE — CARE PLAN
The patient is Stable - Low risk of patient condition declining or worsening    Shift Goals  Clinical Goals: Rest this shift  Patient Goals: Discharge home tomorrow  Family Goals: n/a    Progress made toward(s) clinical / shift goals:    Pt rested this shift    Patient is not progressing towards the following goals:  Pt wants to go home.
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
## 2023-12-16 NOTE — WOUND TEAM
[facility]  Inpatient Services  Initial Wound and Skin Care Evaluation    Admission Date: 12/11/2023     Last order of IP CONSULT TO WOUND CARE was found on 12/12/2023 from Hospital Encounter on 12/11/2023     HPI, PMH, SH: Reviewed    Past Surgical History:   Procedure Laterality Date    IRRIGATION & DEBRIDEMENT GENERAL Right 12/12/2023    Procedure: IRRIGATION AND DEBRIDEMENT, WOUND/BUTTOCKS;  Surgeon: Huan Bansal M.D.;  Location: SURGERY [facility];  Service: General    IRRIGATION & DEBRIDEMENT GENERAL Left 04/26/2022    Procedure: IRRIGATION AND DEBRIDEMENT, WOUND - LEG;  Surgeon: Eric Raman M.D.;  Location: SURGERY [facility];  Service: Orthopedics    WOUND CLOSURE NEURO Left 04/26/2022    Procedure: CLOSURE, WOUND;  Surgeon: Eric Raman M.D.;  Location: SURGERY [facility];  Service: Orthopedics    ORTHOPEDIC OSTEOTOMY Left 04/26/2022    Procedure: OSTECTOMY;  Surgeon: Eric Raman M.D.;  Location: [facility];  Service: Orthopedics    INCISION AND DRAINAGE ORTHOPEDIC Left 01/27/2022    Procedure: INCISION AND DRAINAGE, WOUND, BY ORTHOPEDICS;  Surgeon: Erasmo Stewart M.D.;  Location: [facility];  Service: Orthopedics    BONE BIOPSY Left 01/27/2022    Procedure: BIOPSY, BONE;  Surgeon: Erasmo Stewart M.D.;  Location: [facility];  Service: Orthopedics    INCISION AND DRAINAGE ORTHOPEDIC  01/22/2022    Procedure: INCISION AND DRAINAGE, WOUND, BY ORTHOPEDICS;  Surgeon: Erasmo Stewart M.D.;  Location: [facility];  Service: Orthopedics    SD CYSTOSCOPY,INSERT URETERAL STENT Left 01/04/2022    Procedure: CYSTOSCOPY, WITH URETERAL STENT INSERTION;  Surgeon: Osvaldo Baltazar M.D.;  Location: [facility];  Service: Urology    SD CYSTO/URETERO/PYELOSCOPY, DX Left 01/04/2022    Procedure: URETEROSCOPY;  Surgeon: Osvaldo Baltazar M.D.;  Location: [facility];  Service: Urology    LASERTRIPSY N/A 01/04/2022     Procedure: LITHOTRIPSY, USING LASER;  Surgeon: Osvaldo Baltazar M.D.;  Location: [facility];  Service: Urology    TN CYSTOSCOPY,INSERT URETERAL STENT Left 2021    Procedure: CYSTOSCOPY, WITH URETERAL STENT INSERTION;  Surgeon: Aly Bowen M.D.;  Location: [facility];  Service: Urology    TN CYSTO/URETERO/PYELOSCOPY, DX Left 2021    Procedure: URETEROSCOPY;  Surgeon: Aly Bowen M.D.;  Location: [facility];  Service: Urology    LASERTRIPSY Left 2021    Procedure: LITHOTRIPSY, USING LASER;  Surgeon: Ayl Bowen M.D.;  Location: [facility];  Service: Urology    IRRIGATION & DEBRIDEMENT GENERAL  2020    Procedure: IRRIGATION AND DEBRIDEMENT, WOUND SACRAL ULCER;  Surgeon: Matt Cummins M.D.;  Location: [facility];  Service: Plastics    ULCER DEBRIDEMENT N/A 2019    Procedure: debridement of Sacral grade 4 ulcer - W/BONE BIOPSY, 3 liter wash out. bilateral sliding gluteal myocutaneous flap advancement;  Surgeon: Amadeo Moon M.D.;  Location: [facility];  Service: Plastics    FLAP CLOSURE  2019    Procedure: CLOSURE, FLAP - MUSCLE;  Surgeon: Amadeo Moon M.D.;  Location: [facility];  Service: Plastics    COLOSTOMY N/A 2019    Procedure: CREATION, COLOSTOMY -  placement;  Surgeon: Elías Hannah M.D.;  Location: [facility];  Service: General    COLOSTOMY      COLOSTOMY TAKEDOWN      HERNIA REPAIR      ORIF, ANKLE      PERCUTANEOUOSPINNING LOWER EXTREMITY       Social History     Tobacco Use    Smoking status: Former     Current packs/day: 0.00     Average packs/day: 1 pack/day for 10.0 years (10.0 ttl pk-yrs)     Types: Cigarettes     Start date: 1967     Quit date: 1977     Years since quittin.9    Smokeless tobacco: Never   Substance Use Topics    Alcohol use: Yes     Alcohol/week: 4.2 oz     Types: 7 Standard drinks or equivalent per  week     Comment: 2/day     Chief Complaint   Patient presents with    Fever     101.8 this afternoon       Diagnosis: Osteomyelitis (HCC) [M86.9]    Unit where seen by Wound Team: 2221/01     WOUND CONSULT RELATED TO:  coccyx, R IT, L 2nd toe, LLE    WOUND TEAM PLAN OF CARE - Frequency of Follow-up:   Nursing to follow dressing orders written for wound care. Contact wound team if area fails to progress, deteriorates or with any questions/concerns if something comes up before next scheduled follow up (See below as to whether wound is following and frequency of wound follow up)   coccyx, R IT, L 2nd toe, LLE    WOUND HISTORY:   Pt is being seen at [facility] wound clinic for Coccyx, R IT, LLE and L 2nd toe as well as HH.      *Right buttock debrided by MD Bansal on 12/13/23, wound care team to place wound vac.     WOUND ASSESSMENT/LDA       Wound 06/18/23 Pressure Injury Leg Posterior Left resolved 9/1/23, re-opened 11/17/23 (Active)   Wound Image   12/15/23 1515   Site Assessment Purple;Crusted;Dry;Fragile 12/15/23 1515   Periwound Assessment Dry;Flaky;Pink 12/15/23 1515   Margins Attached edges;Defined edges 12/15/23 1515   Closure Secondary intention 12/15/23 1515   Drainage Amount None 12/15/23 1515   Drainage Description Serosanguineous 12/12/23 1200   Treatments Cleansed;Nonselective debridement;Site care;Offloading 12/15/23 1515   Offloading/DME Heel float boot 12/15/23 1515   Wound Cleansing Foam Cleanser/Washcloth 12/15/23 1515   Periwound Protectant No-sting Skin Prep 12/15/23 1515   Dressing Status Removed 12/15/23 1515   Dressing Changed Changed 12/15/23 1515   Dressing Cleansing/Solutions Not Applicable 12/15/23 1515   Dressing Options Hydrofiber Silver 12/15/23 1515   Dressing Change/Treatment Frequency Every 48 hrs, and As Needed 12/15/23 1515   NEXT Dressing Change/Treatment Date 12/17/23 12/15/23 1515   NEXT Weekly Photo (Inpatient Only) 12/20/23 12/15/23 1515   Wound Team Following Weekly 12/15/23 1515    Non-staged Wound Description Partial thickness 12/15/23 1515   Wound Length (cm) 1.5 cm 12/15/23 1515   Wound Width (cm) 1.5 cm 12/15/23 1515   Wound Depth (cm) 0.1 cm 12/15/23 1515   Wound Surface Area (cm^2) 2.25 cm^2 12/15/23 1515   Wound Volume (cm^3) 0.225 cm^3 12/15/23 1515   Wound Healing % 78 12/15/23 1515   Wound Bed Eschar (%) 100 % 12/15/23 1515   Shape round 12/15/23 1515   Wound Odor None 12/15/23 1515   Exposed Structures None 12/15/23 1515   WOUND NURSE ONLY - Time Spent with Patient (mins) 90 12/15/23 1515       Wound 08/11/23 Full Thickness Wound Foot Lateral Left (Active)   Site Assessment Callus;Fragile;Scabbed;Light Purple 12/15/23 1515   Periwound Assessment Flaky;Dry;Intact 12/15/23 1515   Margins Attached edges;Defined edges 12/15/23 1515   Closure None 12/15/23 1515   Drainage Amount KEN 12/15/23 0500   Drainage Description KEN 12/15/23 0500   Treatments Cleansed;Site care;Offloading 12/15/23 1515   Offloading/DME Heel float boot 12/15/23 1515   Wound Cleansing Foam Cleanser/Washcloth 12/15/23 1515   Dressing Status Removed 12/15/23 1515   Dressing Changed Changed 12/15/23 1515   Dressing Cleansing/Solutions Not Applicable 12/15/23 1515   Dressing Options Offloading Dressing - Heel 12/15/23 1515   Dressing Change/Treatment Frequency Every 48 hrs, and As Needed 12/15/23 1515   NEXT Dressing Change/Treatment Date 12/17/23 12/15/23 1515   NEXT Weekly Photo (Inpatient Only) 12/20/23 12/15/23 1515   Wound Team Following Weekly 12/15/23 1515   Wound Bed Epithelium (%) 100 % 12/15/23 1515   Shape oval 12/15/23 1515   Wound Odor None 12/15/23 1515   Exposed Structures None 12/15/23 1515   WOUND NURSE ONLY - Time Spent with Patient (mins) 90 12/15/23 1515       Wound 09/13/23 Pressure Injury Coccyx Superior (Active)   Wound Image   12/15/23 1515   Site Assessment Red;Yellow;Granulation tissue;Slough;Scabbed 12/15/23 1515   Periwound Assessment Dry;Intact;Scar tissue 12/15/23 1515   Margins Attached  edges;Defined edges 12/15/23 1515   Closure Secondary intention 12/15/23 1515   Drainage Amount None 12/15/23 1515   Drainage Description KEN 12/15/23 0500   Treatments Cleansed;Nonselective debridement;Site care;Offloading 12/15/23 1515   Wound Cleansing Foam Cleanser/Washcloth 12/15/23 1515   Periwound Protectant No-sting Skin Prep 12/15/23 1515   Dressing Status Removed 12/15/23 1515   Dressing Changed Changed 12/15/23 1515   Dressing Cleansing/Solutions Not Applicable 12/15/23 1515   Dressing Options Hydrofiber Silver 12/15/23 1515   Dressing Change/Treatment Frequency Every 48 hrs, and As Needed 12/15/23 1515   NEXT Dressing Change/Treatment Date 12/17/23 12/15/23 1515   NEXT Weekly Photo (Inpatient Only) 12/20/23 12/15/23 1515   Wound Team Following Weekly 12/15/23 1515   WOUND NURSE ONLY - Pressure Injury Stage 4 12/15/23 1515   Wound Length (cm) 4 cm 12/15/23 1515   Wound Width (cm) 0.7 cm 12/15/23 1515   Wound Depth (cm) 0.4 cm 12/15/23 1515   Wound Surface Area (cm^2) 2.8 cm^2 12/15/23 1515   Wound Volume (cm^3) 1.12 cm^3 12/15/23 1515   Wound Healing % -107 12/15/23 1515   Shape oval linear 12/15/23 1515   Wound Odor None 12/15/23 1515   Exposed Structures None 12/15/23 1515   WOUND NURSE ONLY - Time Spent with Patient (mins) 90 12/15/23 1515       Wound 11/29/23 Full Thickness Wound Toe, 2nd Dorsal Left (Active)   Site Assessment Red;Purple;Scabbed 12/15/23 1515   Periwound Assessment Dry;Intact;Edema;Fragile 12/15/23 1515   Margins Attached edges;Defined edges 12/15/23 1515   Closure Secondary intention 12/15/23 1515   Drainage Amount None 12/15/23 1515   Treatments Cleansed;Site care;Offloading 12/15/23 1515   Wound Cleansing Foam Cleanser/Washcloth 12/15/23 1515   Periwound Protectant No-sting Skin Prep 12/15/23 1515   Dressing Status Removed 12/15/23 1515   Dressing Changed Changed 12/15/23 1515   Dressing Cleansing/Solutions Not Applicable 12/12/23 1200   Dressing Options Hydrofiber Silver;Hypafix  Tape 12/15/23 1515   Dressing Change/Treatment Frequency Every 48 hrs, and As Needed 12/15/23 1515   NEXT Dressing Change/Treatment Date 12/17/23 12/15/23 1515   NEXT Weekly Photo (Inpatient Only) 12/20/23 12/15/23 1515   Wound Team Following Weekly 12/15/23 1515   Wound Length (cm) 0.5 cm 12/12/23 1200   Wound Width (cm) 1 cm 12/12/23 1200   Wound Depth (cm) 0.1 cm 12/12/23 1200   Wound Surface Area (cm^2) 0.5 cm^2 12/12/23 1200   Wound Volume (cm^3) 0.05 cm^3 12/12/23 1200   Wound Healing % -456 12/12/23 1200   Shape oval 12/15/23 1515   Exposed Structures None 12/15/23 1515   WOUND NURSE ONLY - Time Spent with Patient (mins) 90 12/15/23 1515       Wound 12/12/23 Incision Buttocks Right (Active)   Wound Image    12/15/23 1515   Site Assessment Red;Yellow;Drainage;Fragile;Undermining;Tunneling 12/15/23 1515   Periwound Assessment Dry;Intact;Scar tissue;Pink 12/15/23 1515   Margins Defined edges;Unattached edges 12/15/23 1515   Closure Secondary intention 12/15/23 1515   Drainage Amount Scant 12/15/23 1515   Drainage Description Serosanguineous 12/15/23 1515   Treatments Cleansed;Nonselective debridement;Site care;Offloading 12/15/23 1515   Wound Cleansing Approved Wound Cleanser 12/15/23 1515   Periwound Protectant No-sting Skin Prep;Paste Ring;Drape 12/15/23 1515   Dressing Status Removed 12/15/23 1515   Dressing Changed Changed 12/15/23 1515   Dressing Cleansing/Solutions Normal Saline 12/15/23 1515   Dressing Options Wound Vac;Offloading Dressing - Sacral 12/15/23 1515   Dressing Change/Treatment Frequency Monday, Wednesday, Friday, and As Needed 12/15/23 1515   NEXT Dressing Change/Treatment Date 12/18/23 12/15/23 1515   NEXT Weekly Photo (Inpatient Only) 12/20/23 12/15/23 1515   Wound Team Following 3x Weekly 12/15/23 1515   Non-staged Wound Description Full thickness 12/15/23 1515   Wound Length (cm) 4 cm 12/15/23 1515   Wound Width (cm) 3.5 cm 12/15/23 1515   Wound Depth (cm) 4 cm 12/15/23 1515   Wound  Surface Area (cm^2) 14 cm^2 12/15/23 1515   Wound Volume (cm^3) 56 cm^3 12/15/23 1515   Tunneling (cm) 7 cm 12/15/23 1515   Tunneling Clock Position of Wound 9 12/15/23 1515   Undermining (cm) 4 cm 12/15/23 1515   Undermining of Wound, 1st Location From 10 o'clock;To 8 o'clock 12/15/23 1515   Shape oval 12/15/23 1515   Wound Odor None 12/15/23 1515   Pulses N/A 12/15/23 1515   WOUND NURSE ONLY - Time Spent with Patient (mins) 90 12/15/23 1515           Wound 12/15/23 Abrasion Pretibial Superior Left (Active)   Wound Image   12/15/23 1515   Site Assessment Pink;Brown;Scabbed 12/15/23 1515   Periwound Assessment Dry;Intact 12/15/23 1515   Margins Attached edges;Defined edges 12/15/23 1515   Closure Secondary intention 12/15/23 1515   Drainage Amount None 12/15/23 1515   Treatments Cleansed;Nonselective debridement;Site care;Offloading 12/15/23 1515   Offloading/DME Other (comment);Offloading Boot 12/15/23 1515   Wound Cleansing Foam Cleanser/Washcloth 12/15/23 1515   Periwound Protectant No-sting Skin Prep 12/15/23 1515   Dressing Status Removed 12/15/23 1515   Dressing Changed Changed 12/15/23 1515   Dressing Options Hydrofiber Silver 12/15/23 1515   Dressing Change/Treatment Frequency Every 48 hrs, and As Needed 12/15/23 1515   NEXT Dressing Change/Treatment Date 12/17/23 12/15/23 1515   NEXT Weekly Photo (Inpatient Only) 12/20/23 12/15/23 1515   Wound Team Following 3x Weekly 12/15/23 1515   Non-staged Wound Description Full thickness 12/15/23 1515   Shape scabs x4 12/15/23 1515   Wound Odor None 12/15/23 1515   WOUND NURSE ONLY - Time Spent with Patient (mins) 90 12/15/23 1515     Vascular:    JUAN MANUEL:   No results found.    Lab Values:    Lab Results   Component Value Date/Time    WBC 5.3 12/15/2023 12:16 AM    RBC 3.52 (L) 12/15/2023 12:16 AM    HEMOGLOBIN 11.8 (L) 12/15/2023 12:16 AM    HEMATOCRIT 36.0 (L) 12/15/2023 12:16 AM    CREACTPROT 19.12 (H) 12/12/2023 09:39 PM    SEDRATEWES 23 (H) 04/01/2022 02:26 PM          Culture Results show:  No results found for this or any previous visit (from the past 720 hour(s)).    Pain Level/Medicated:  Patient denies pain       INTERVENTIONS BY WOUND TEAM:  Chart and images reviewed. Discussed with bedside RN. All areas of concern (based on picture review, LDA review and discussion with bedside RN) have been thoroughly assessed. Documentation of areas based on significant findings. This RN in to assess patient. Performed standard wound care which includes appropriate positioning, dressing removal and non-selective debridement. Pictures and measurements obtained weekly if/when required.    Wound:  coccyx  Preparation for Dressing removal: Removed without difficulty  Cleansed/Non-selectively Debrided with:  Normal Saline and Gauze  Raeann wound: Cleansed with Normal Saline and Gauze, Prepped with No Sting  Primary Dressing:  silver hydrofiber  Secondary (Outer) Dressing: sacral offloading drsg     Wound:  R IT/Buttock  Preparation for Dressing removal: Removed without difficulty  Cleansed/Non-selectively Debrided with:  Normal Saline and Gauze  Raeann wound: Cleansed with Normal Saline and Gauze, Prepped with No Sting, paste ring, drape  Primary Dressing:  black foam with black foam button  Secondary (Outer) Dressing: trac pad, drape, offloading dressing    Wound:  LLE x3  Preparation for Dressing removal: Removed without difficulty  Cleansed/Non-selectively Debrided with:  Normal Saline and Gauze  Raeann wound: Cleansed with Normal Saline and Gauze, Prepped with Barrier paste  Primary Dressing:  silver hydrofiber  Secondary (Outer) Dressing: adhesive foam    Wound:  L 2nd toe  Preparation for Dressing removal: Removed without difficulty  Cleansed/Non-selectively Debrided with:  Normal Saline and Gauze  Primary Dressing:  silver hydrofiber, hypafix tape     Advanced Wound Care Discharge Planning  Number of Clinicians necessary to complete wound care: 2 need help elevating leg  Is patient  requiring IV pain medications for dressing changes:  No   Length of time for dressing change 60 min. (This does not include chart review, pre-medication time, set up, clean up or time spent charting.)    Interdisciplinary consultation: Patient, Bedside RN (Mady)  EVALUATION / RATIONALE FOR TREATMENT:     Date:  12/15/23  Wound Status:  Initial evaluation to Right IT/Buttock post debridement; Coccyx and LLE wounds progressing as anticipated.     Right IT/Buttock with majority red granular tissue, scant yellow slough with visible muscle and palpable bone. Scant serosanguinous drainage, undermining cirmferential with tunnel at 9 o'clock. NPWT with veraflow and normal saline applied to assist with wound closure by secondary intention, management of bio-burden and exudate through mechanical debridement, and increase oxygenation and granulation tissue production to wound bed.      Coccyx wound with red granular tissue and scant drainage, scar tissue present to periwound. LLE shallow with majority dry scabs. Aquacel Ag Hydrofiber applied to manage bioburden, absorb exudate, and maintain a moist wound environment without laterally wicking exudate therefore reducing shira-wound maceration.    Date:  12/12/23  Wound Status:  Initial evaluation    Per Dr Bansal note pt to go to [facility] for debridement of R IT wound, so assessed wound and placed wet-dry drsg. Per pt possibility of VAC placement.  Coccyx wounds are small with scar tissue present. LLE shallow with scant drainage. Aquacel Ag Hydrofiber applied to manage bioburden, absorb exudate, and maintain a moist wound environment without laterally wicking exudate therefore reducing shira-wound maceration.    Nsg had changed colostomy pouching system yesterday and it is intact. Supplies at bedside. Stoma red with brown soft output.            Goals: Slow steady decrease in wound area and depth weekly.    NURSING PLAN OF CARE ORDERS:  Dressing changes: See Dressing Care orders  RN  Prevention Protocol    NUTRITION RECOMMENDATIONS   Wound Team Recommendations:  N/A    DIET ORDERS (From admission to next 24h)       Start     Ordered    12/12/23 0005  Diet NPO Restrict to: Sips with Medications  ALL MEALS        Question:  Diet NPO Restrict to:  Answer:  Sips with Medications    12/12/23 0005                    PREVENTATIVE INTERVENTIONS:    Q shift Luis Enrique - performed per nursing policy  Q shift pressure point assessments - performed per nursing policy    Surface/Positioning  Standard/trauma mattress - Currently in Place  Reposition q 2 hours - Currently in Place  TAPs Turning system - Currently in Place  Waffle overlay  - Currently in Place    Offloading/Redistribution  Sacral offloading dressing (Silicone dressing) - Currently in Place  Heel offloading dressing (Silicone dressing) - Currently in Place  Heel float boots (Prevalon boot) - Currently in Place      Containment/Moisture Prevention    Fecal ostomy - Currently in Place  Suprapubic - Currently in Place    Anticipated discharge plans:  Home Health Care and Outpatient Wound Center        Vac Discharge Needs:  Vac Discharge plan is purely a recommendation from wound team and not a requirement for discharge unless otherwise stated by physician.  Veraflo Vac while inpatient, ok to transition to Regular Vac on discharge

## 2023-12-17 ENCOUNTER — APPOINTMENT (OUTPATIENT)
Dept: RADIOLOGY | Facility: MEDICAL CENTER | Age: 73
DRG: 500 | End: 2023-12-17
Attending: INTERNAL MEDICINE
Payer: MEDICARE

## 2023-12-17 LAB
ALBUMIN SERPL BCP-MCNC: 2.8 G/DL (ref 3.2–4.9)
ALBUMIN/GLOB SERPL: 0.9 G/DL
ALP SERPL-CCNC: 62 U/L (ref 30–99)
ALT SERPL-CCNC: 14 U/L (ref 2–50)
ANION GAP SERPL CALC-SCNC: 10 MMOL/L (ref 7–16)
AST SERPL-CCNC: 15 U/L (ref 12–45)
BACTERIA SPEC ANAEROBE CULT: ABNORMAL
BACTERIA SPEC ANAEROBE CULT: ABNORMAL
BILIRUB SERPL-MCNC: 0.3 MG/DL (ref 0.1–1.5)
BUN SERPL-MCNC: 15 MG/DL (ref 8–22)
CALCIUM ALBUM COR SERPL-MCNC: 9.7 MG/DL (ref 8.5–10.5)
CALCIUM SERPL-MCNC: 8.7 MG/DL (ref 8.4–10.2)
CHLORIDE SERPL-SCNC: 110 MMOL/L (ref 96–112)
CO2 SERPL-SCNC: 24 MMOL/L (ref 20–33)
CREAT SERPL-MCNC: 0.39 MG/DL (ref 0.5–1.4)
ERYTHROCYTE [DISTWIDTH] IN BLOOD BY AUTOMATED COUNT: 48.7 FL (ref 35.9–50)
GFR SERPLBLD CREATININE-BSD FMLA CKD-EPI: 116 ML/MIN/1.73 M 2
GLOBULIN SER CALC-MCNC: 3 G/DL (ref 1.9–3.5)
GLUCOSE SERPL-MCNC: 97 MG/DL (ref 65–99)
HCT VFR BLD AUTO: 36.3 % (ref 42–52)
HGB BLD-MCNC: 12.1 G/DL (ref 14–18)
MAGNESIUM SERPL-MCNC: 2.2 MG/DL (ref 1.5–2.5)
MCH RBC QN AUTO: 34 PG (ref 27–33)
MCHC RBC AUTO-ENTMCNC: 33.3 G/DL (ref 32.3–36.5)
MCV RBC AUTO: 102 FL (ref 81.4–97.8)
MYCOBACTERIUM SPEC CULT: NORMAL
PLATELET # BLD AUTO: 170 K/UL (ref 164–446)
PMV BLD AUTO: 9 FL (ref 9–12.9)
POTASSIUM SERPL-SCNC: 4 MMOL/L (ref 3.6–5.5)
PROT SERPL-MCNC: 5.8 G/DL (ref 6–8.2)
RBC # BLD AUTO: 3.56 M/UL (ref 4.7–6.1)
RHODAMINE-AURAMINE STN SPEC: NORMAL
SIGNIFICANT IND 70042: ABNORMAL
SIGNIFICANT IND 70042: NORMAL
SITE SITE: ABNORMAL
SITE SITE: NORMAL
SODIUM SERPL-SCNC: 144 MMOL/L (ref 135–145)
SOURCE SOURCE: ABNORMAL
SOURCE SOURCE: NORMAL
VANCOMYCIN TROUGH SERPL-MCNC: 15 UG/ML (ref 10–20)
WBC # BLD AUTO: 5.2 K/UL (ref 4.8–10.8)

## 2023-12-17 PROCEDURE — 770001 HCHG ROOM/CARE - MED/SURG/GYN PRIV*

## 2023-12-17 PROCEDURE — 36415 COLL VENOUS BLD VENIPUNCTURE: CPT

## 2023-12-17 PROCEDURE — 700102 HCHG RX REV CODE 250 W/ 637 OVERRIDE(OP): Performed by: INTERNAL MEDICINE

## 2023-12-17 PROCEDURE — 85027 COMPLETE CBC AUTOMATED: CPT

## 2023-12-17 PROCEDURE — 94760 N-INVAS EAR/PLS OXIMETRY 1: CPT

## 2023-12-17 PROCEDURE — 700101 HCHG RX REV CODE 250: Performed by: INTERNAL MEDICINE

## 2023-12-17 PROCEDURE — 700111 HCHG RX REV CODE 636 W/ 250 OVERRIDE (IP): Performed by: INTERNAL MEDICINE

## 2023-12-17 PROCEDURE — 97605 NEG PRS WND THER DME<=50SQCM: CPT

## 2023-12-17 PROCEDURE — 700105 HCHG RX REV CODE 258: Performed by: INTERNAL MEDICINE

## 2023-12-17 PROCEDURE — A9270 NON-COVERED ITEM OR SERVICE: HCPCS | Performed by: INTERNAL MEDICINE

## 2023-12-17 PROCEDURE — 99222 1ST HOSP IP/OBS MODERATE 55: CPT | Performed by: SURGERY

## 2023-12-17 PROCEDURE — 80053 COMPREHEN METABOLIC PANEL: CPT

## 2023-12-17 PROCEDURE — 80202 ASSAY OF VANCOMYCIN: CPT

## 2023-12-17 PROCEDURE — 99233 SBSQ HOSP IP/OBS HIGH 50: CPT | Performed by: INTERNAL MEDICINE

## 2023-12-17 PROCEDURE — 83735 ASSAY OF MAGNESIUM: CPT

## 2023-12-17 RX ORDER — HEPARIN SODIUM 5000 [USP'U]/ML
5000 INJECTION, SOLUTION INTRAVENOUS; SUBCUTANEOUS EVERY 8 HOURS
Status: DISCONTINUED | OUTPATIENT
Start: 2023-12-17 | End: 2023-12-23

## 2023-12-17 RX ADMIN — BACLOFEN 20 MG: 10 TABLET ORAL at 17:12

## 2023-12-17 RX ADMIN — MEROPENEM 500 MG: 500 INJECTION, POWDER, FOR SOLUTION INTRAVENOUS at 11:13

## 2023-12-17 RX ADMIN — DOCUSATE SODIUM 100 MG: 100 CAPSULE, LIQUID FILLED ORAL at 05:54

## 2023-12-17 RX ADMIN — DOCUSATE SODIUM 100 MG: 100 CAPSULE, LIQUID FILLED ORAL at 17:13

## 2023-12-17 RX ADMIN — FERROUS SULFATE TAB 325 MG (65 MG ELEMENTAL FE) 325 MG: 325 (65 FE) TAB at 05:54

## 2023-12-17 RX ADMIN — VANCOMYCIN HYDROCHLORIDE 1750 MG: 5 INJECTION, POWDER, LYOPHILIZED, FOR SOLUTION INTRAVENOUS at 13:58

## 2023-12-17 RX ADMIN — HEPARIN SODIUM 5000 UNITS: 5000 INJECTION, SOLUTION INTRAVENOUS; SUBCUTANEOUS at 13:55

## 2023-12-17 RX ADMIN — Medication 10 MG: at 20:29

## 2023-12-17 RX ADMIN — FERROUS SULFATE TAB 325 MG (65 MG ELEMENTAL FE) 325 MG: 325 (65 FE) TAB at 17:14

## 2023-12-17 RX ADMIN — SENNOSIDES 8.6 MG: 8.6 TABLET, FILM COATED ORAL at 05:54

## 2023-12-17 RX ADMIN — BACLOFEN 20 MG: 10 TABLET ORAL at 07:48

## 2023-12-17 RX ADMIN — AMLODIPINE BESYLATE 5 MG: 5 TABLET ORAL at 05:54

## 2023-12-17 RX ADMIN — MEROPENEM 500 MG: 500 INJECTION, POWDER, FOR SOLUTION INTRAVENOUS at 00:03

## 2023-12-17 RX ADMIN — BACLOFEN 20 MG: 10 TABLET ORAL at 11:12

## 2023-12-17 RX ADMIN — SENNOSIDES 8.6 MG: 8.6 TABLET, FILM COATED ORAL at 18:00

## 2023-12-17 RX ADMIN — POLYETHYLENE GLYCOL 3350 1 PACKET: 17 POWDER, FOR SOLUTION ORAL at 05:46

## 2023-12-17 RX ADMIN — HEPARIN SODIUM 5000 UNITS: 5000 INJECTION, SOLUTION INTRAVENOUS; SUBCUTANEOUS at 20:28

## 2023-12-17 RX ADMIN — Medication 400 MG: at 05:54

## 2023-12-17 RX ADMIN — MEROPENEM 500 MG: 500 INJECTION, POWDER, FOR SOLUTION INTRAVENOUS at 05:50

## 2023-12-17 RX ADMIN — BACLOFEN 20 MG: 10 TABLET ORAL at 20:29

## 2023-12-17 RX ADMIN — MEROPENEM 500 MG: 500 INJECTION, POWDER, FOR SOLUTION INTRAVENOUS at 17:19

## 2023-12-17 ASSESSMENT — ENCOUNTER SYMPTOMS
DEPRESSION: 0
ABDOMINAL PAIN: 0
SENSORY CHANGE: 0
BLURRED VISION: 0
PHOTOPHOBIA: 0
VOMITING: 0
SHORTNESS OF BREATH: 0
DIARRHEA: 0
DIZZINESS: 0
CONSTIPATION: 0
COUGH: 0
HEADACHES: 0
INSOMNIA: 0
MYALGIAS: 0
CHILLS: 0
FEVER: 0
SPEECH CHANGE: 0
HEARTBURN: 0
CLAUDICATION: 0
WEAKNESS: 0
NERVOUS/ANXIOUS: 0

## 2023-12-17 ASSESSMENT — PAIN DESCRIPTION - PAIN TYPE
TYPE: ACUTE PAIN
TYPE: ACUTE PAIN
TYPE: ACUTE PAIN;CHRONIC PAIN

## 2023-12-17 NOTE — PROGRESS NOTES
Assumed care. Patient awake and alert talking with family via tablet. He made no complaints of pain or discomfort.

## 2023-12-17 NOTE — CARE PLAN
The patient is Stable - Low risk of patient condition declining or worsening    Shift Goals  Clinical Goals: Skin protocol, Antibiotic therapy, Pain  Patient Goals: Rest, comfort, sleep  Family Goals: n/a    Progress made toward(s) clinical / shift goals:  Patient is compliant with repositioning every two hours. His wound vac is in place and operating properly. He understands the plan of care is for PICC placement and transfer to a LTAC for continued antibiotic therapy and wound care.    Patient is not progressing towards the following goals:

## 2023-12-17 NOTE — H&P
"Surgery Orthopedic History & Physical Note    Date  12/17/2023    Primary Care Physician  WESTON Pretty.    CC  Bone scan report of left first and third toe inflammation versus infection    HPI  Mr. Anderson is a 73-year-old gentleman known to my colleague Dr. Eric Raman for I&D of his left leg in April 2022.  The hospitalist ordered a bone scan and the radiology report read possible first and third toe inflammation versus infection I was consulted as the renal orthopedic clinic surgeon on-call.  The patient states he believes he bumped his left second toe about a month ago and has had a superficial ulcer.  He does report to him it seems like it is getting better.  He is paraplegic and denies pain there is therefore no character nature duration or onset of his pain.    Past Medical History:   Diagnosis Date    A-fib ([facility])     Acute cystitis without hematuria 10/23/2021    Acute UTI 6/18/2023    Arrhythmia     Atrial flutter ([facility])     Blood clotting disorder ([facility])     Patient is on Xarelto    Bowel habit changes     Colostomy    Clot hematuria 1/23/2023    GERD (gastroesophageal reflux disease)     Hypertension     Hypokalemia 6/18/2023    Kidney stones     Metabolic acidosis 6/18/2023    Neurogenic bladder     S/P cath    Open wound 11/18/2022    sacrum with wound vac, left ankle, [facility]    Quadriplegia, C5-C7 complete ([facility])     patient reports \" incomplete quad\"    Suprapubic catheter ([facility])        Past Surgical History:   Procedure Laterality Date    IRRIGATION & DEBRIDEMENT GENERAL Right 12/12/2023    Procedure: IRRIGATION AND DEBRIDEMENT, WOUND/BUTTOCKS;  Surgeon: Huan Bansal M.D.;  Location: SURGERY [facility];  Service: General    IRRIGATION & DEBRIDEMENT GENERAL Left 04/26/2022    Procedure: IRRIGATION AND DEBRIDEMENT, WOUND - LEG;  Surgeon: Eric Raman M.D.;  Location: SURGERY [facility];  Service: Orthopedics    WOUND CLOSURE NEURO Left 04/26/2022    Procedure: CLOSURE, " WOUND;  Surgeon: Eric Raman M.D.;  Location: [facility];  Service: Orthopedics    ORTHOPEDIC OSTEOTOMY Left 04/26/2022    Procedure: OSTECTOMY;  Surgeon: Eric Raman M.D.;  Location: [facility];  Service: Orthopedics    INCISION AND DRAINAGE ORTHOPEDIC Left 01/27/2022    Procedure: INCISION AND DRAINAGE, WOUND, BY ORTHOPEDICS;  Surgeon: Erasmo Stewart M.D.;  Location: [facility];  Service: Orthopedics    BONE BIOPSY Left 01/27/2022    Procedure: BIOPSY, BONE;  Surgeon: Erasmo Stewart M.D.;  Location: [facility];  Service: Orthopedics    INCISION AND DRAINAGE ORTHOPEDIC  01/22/2022    Procedure: INCISION AND DRAINAGE, WOUND, BY ORTHOPEDICS;  Surgeon: Erasmo Stewart M.D.;  Location: [facility];  Service: Orthopedics    WI CYSTOSCOPY,INSERT URETERAL STENT Left 01/04/2022    Procedure: CYSTOSCOPY, WITH URETERAL STENT INSERTION;  Surgeon: Osvaldo Baltazar M.D.;  Location: [facility];  Service: Urology    WI CYSTO/URETERO/PYELOSCOPY, DX Left 01/04/2022    Procedure: URETEROSCOPY;  Surgeon: Osvaldo Baltazar M.D.;  Location: [facility];  Service: Urology    LASERTRIPSY N/A 01/04/2022    Procedure: LITHOTRIPSY, USING LASER;  Surgeon: Osvaldo Baltazar M.D.;  Location: [facility];  Service: Urology    WI CYSTOSCOPY,INSERT URETERAL STENT Left 12/16/2021    Procedure: CYSTOSCOPY, WITH URETERAL STENT INSERTION;  Surgeon: Aly Bowen M.D.;  Location: [facility];  Service: Urology    WI CYSTO/URETERO/PYELOSCOPY, DX Left 12/16/2021    Procedure: URETEROSCOPY;  Surgeon: Aly Bowen M.D.;  Location: [facility];  Service: Urology    LASERTRIPSY Left 12/16/2021    Procedure: LITHOTRIPSY, USING LASER;  Surgeon: Aly Bowen M.D.;  Location: [facility];  Service: Urology    IRRIGATION & DEBRIDEMENT GENERAL  12/20/2020    Procedure: IRRIGATION AND DEBRIDEMENT, WOUND SACRAL ULCER;   Surgeon: Matt Cummins M.D.;  Location: SURGERY [facility];  Service: Plastics    ULCER DEBRIDEMENT N/A 08/21/2019    Procedure: debridement of Sacral grade 4 ulcer - W/BONE BIOPSY, 3 liter wash out. bilateral sliding gluteal myocutaneous flap advancement;  Surgeon: Amadeo Moon M.D.;  Location: SURGERY [facility];  Service: Plastics    FLAP CLOSURE  08/21/2019    Procedure: CLOSURE, FLAP - MUSCLE;  Surgeon: Amadeo Moon M.D.;  Location: SURGERY [facility];  Service: Plastics    COLOSTOMY N/A 07/27/2019    Procedure: CREATION, COLOSTOMY -  placement;  Surgeon: Elías Hannah M.D.;  Location: SURGERY [facility];  Service: General    COLOSTOMY      COLOSTOMY TAKEDOWN      HERNIA REPAIR      ORIF, ANKLE      PERCUTANEOUOSPINNING LOWER EXTREMITY         Current Facility-Administered Medications   Medication Dose Route Frequency Provider Last Rate Last Admin    polyethylene glycol/lytes (Miralax) Packet 1 Packet  1 Packet Oral DAILY ANKIT Patel.O.   1 Packet at 12/17/23 0546    docusate sodium (Colace) capsule 100 mg  100 mg Oral BID ANKIT Patel.O.   100 mg at 12/17/23 0554    sennosides (Senokot) 8.6 MG tablet 8.6 mg  8.6 mg Oral BID ANKIT Patel.O.   8.6 mg at 12/17/23 0554    amLODIPine (Norvasc) tablet 5 mg  5 mg Oral Q DAY ANKIT Patel.O.   5 mg at 12/17/23 0554    vancomycin 1750 mg/500mL NS IVPB premix  1,750 mg Intravenous Q18HRS Rosmery Crabtree M.D.   Stopped at 12/16/23 2218    MD Alert...Vancomycin per Pharmacy   Other PHARMACY TO DOSE Rosmery Crabtree M.D.        baclofen (Lioresal) tablet 20 mg  20 mg Oral 4X/DAY ACHS ANKIT Knight.O.   20 mg at 12/17/23 0748    ferrous sulfate tablet 325 mg  325 mg Oral BID ANKIT Knight.O.   325 mg at 12/17/23 0554    losartan (Cozaar) tablet 50 mg  50 mg Oral Q EVENING Amber Zuniga D.O.   50 mg at 12/16/23 1711    melatonin tablet 10 mg  10 mg Oral QHS Dario Almonte D.O.   10 mg at 12/16/23 2040     acetaminophen (Tylenol) tablet 650 mg  650 mg Oral Q6HRS PRN IMELDA KnightODeniz   650 mg at 23 1218    labetalol (Normodyne/Trandate) injection 10 mg  10 mg Intravenous Q4HRS PRN IMELDA KnightO.        ondansetron (Zofran) syringe/vial injection 4 mg  4 mg Intravenous Q4HRS PRN Dario Almonte D.O.        ondansetron (Zofran ODT) dispertab 4 mg  4 mg Oral Q4HRS PRN IMELDA KnightODeniz        magnesium oxide tablet 400 mg  400 mg Oral QAM ANKIT Patel.ODeniz   400 mg at 23 0554    meropenem (Merrem) 500 mg in  mL IV-MBP  500 mg Intravenous Q6HRS Rosmery Crabtree M.D. 200 mL/hr at 23 0550 500 mg at 23 0550       Social History     Socioeconomic History    Marital status:      Spouse name: Not on file    Number of children: Not on file    Years of education: Not on file    Highest education level: Not on file   Occupational History    Not on file   Tobacco Use    Smoking status: Former     Current packs/day: 0.00     Average packs/day: 1 pack/day for 10.0 years (10.0 ttl pk-yrs)     Types: Cigarettes     Start date: 1967     Quit date: 1977     Years since quittin.9    Smokeless tobacco: Never   Vaping Use    Vaping Use: Never used   Substance and Sexual Activity    Alcohol use: Yes     Alcohol/week: 4.2 oz     Types: 7 Standard drinks or equivalent per week     Comment: 2/day    Drug use: No    Sexual activity: Not on file   Other Topics Concern    Not on file   Social History Narrative    Not on file     Social Determinants of Health     Financial Resource Strain: Not on file   Food Insecurity: No Food Insecurity (2019)    Hunger Vital Sign     Worried About Running Out of Food in the Last Year: Never true     Ran Out of Food in the Last Year: Never true   Transportation Needs: No Transportation Needs (2019)    PRAPARE - Transportation     Lack of Transportation (Medical): No     Lack of Transportation (Non-Medical): No   Physical Activity:  Not on file   Stress: Not on file   Social Connections: Not on file   Intimate Partner Violence: Not on file   Housing Stability: Not on file       Family History   Problem Relation Age of Onset    Heart Disease Father        Allergies  Sulfa drugs    Review of Systems  Negative    Physical Exam    Vital Signs  Blood Pressure : (!) 168/83 (Rn aware)   Temperature: 36.5 °C (97.7 °F)   Pulse: (!) 51   Respiration: 18   Pulse Oximetry: 93 %   General:  Well appearing, in no acute distress. Appropriate and cooperative with the exam.  HEENT: Normocephalic, atraumatic. Cranial nerves II-XII are grossly intact.  Cardiovascular: 2+ distal pulses. No cyanosis, clubbing, or edema.  Pulmonary: Breathing comfortably on room air without labor.  Abdomen: Soft and unremarkable.  Skin: No rashes, jaundice, or cyanosis.  Lymphatic: No epitrochlear lymphadenopathy.  Spine:  Clinically midline.   Gait: Nonambulatory hospital bed  Musculoskeletal: Examination of his left foot there is no heel ulcer.  His second toe has a very superficial dorsal abrasion with no erythema no fluctuance no drainage, overall appears very benign.  Mild soft tissue swelling only.  Neurologic: Paraplegic bilateral lower extremities.      Labs:  Recent Labs     12/15/23  0016 12/16/23  0216 12/17/23  0235   WBC 5.3 5.4 5.2   RBC 3.52* 3.47* 3.56*   HEMOGLOBIN 11.8* 11.8* 12.1*   HEMATOCRIT 36.0* 36.3* 36.3*   .3* 104.6* 102.0*   MCH 33.5* 34.0* 34.0*   MCHC 32.8 32.5 33.3   RDW 49.7 49.9 48.7   PLATELETCT 157* 156* 170   MPV 8.9* 8.6* 9.0     Recent Labs     12/15/23  0016 12/16/23  0216 12/17/23  0235   SODIUM 144 141 144   POTASSIUM 3.4* 4.4 4.0   CHLORIDE 110 108 110   CO2 24 25 24   GLUCOSE 109* 94 97   BUN 16 14 15   CREATININE 0.50 0.54 0.39*   CALCIUM 8.4 8.7 8.7         Recent Labs     12/17/23  0235   ASTSGOT 15   ALTSGPT 14   TBILIRUBIN 0.3   ALKPHOSPHAT 62   GLOBULIN 3.0       Radiology:  NM-BONE/JOINT SCAN 3 PHASE STUDY FLOW   Final Result       1.  Mild increased activity in the LEFT 1st and 3rd toes on blood pool and delayed images possibly indicating inflammation/infection.   2.  No significant blood flow asymmetry.      EC-ECHOCARDIOGRAM COMPLETE W/O CONT   Final Result      CT-ABDOMEN-PELVIS WITH   Final Result         1.  Right ischial decubitus ulcer extending to bone with soft tissue gas tracking along the right perineum. Appearance suggesting osteomyelitis, consider component of necrotizing fasciitis as clinically appropriate.   2.  Small pericardial effusion   3.  Left adrenal nodule, density on prior noncontrast CT demonstrates adenoma.   4.  Hepatomegaly   5.  Enlarged prostate, workup and evaluation for causes of prostate enlargement recommended as clinically appropriate.   6.  Atherosclerosis and atherosclerotic coronary artery disease      These findings were discussed with the patient's clinician, Felix Newell, on 12/11/2023 10:44 PM.      DX-FOOT-2- LEFT   Final Result         1.  No acute traumatic bony injury.   2.  No destructive osseous process is easily identified, evaluation is limited however due to degeneration. Note that osteomyelitis can be difficult to identify on plain film and bone scan would offer improved diagnostic sensitivity as clinically    appropriate.      DX-CHEST-PORTABLE (1 VIEW)   Final Result         1. No acute cardiopulmonary abnormalities are identified.      IR-US GUIDED PIV    (Results Pending)         Assessment/Plan:  73-year-old male with left second toe superficial abrasion and no sign of osteomyelitis in my opinion.    Plan: A thorough discussion with the patient regarding his diagnosis.  I think he has a superficial abrasion and inflammation.  The bone scan was concerning for the first and third toe which looked totally benign and then the second toe is a superficial abrasion only.  I do not recommend amputation at this time.  He does not have an urgent surgical indication at this time.  I  recommend he follow-up with Dr. Raman electively on outpatient basis, if he would like his foot looked at again and to monitor the progress of his toe.  The patient consents to this plan.

## 2023-12-17 NOTE — PROGRESS NOTES
Advised by wound care nurse to change settings on patients wound vac. Wound vac keeps defaulting to180 seconds on the vac therapy time setting. It should be 2.5 hrs. Per wound care to change setting you hit therapy settings. Then okay , hit okay again to go to next screen and change the Vac Therapy time setting to 2.5 hours. Push on 2 and then the plus sign to get 2.5 hours. Then okay and okay again to get to the main screen.
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
## 2023-12-17 NOTE — PROGRESS NOTES
4 Eyes Skin Assessment Completed by ZAIRA Thibodeaux and ZAIRA Taylor.    Head WDL  Ears WDL  Nose WDL  Mouth WDL  Neck WDL  Breast/Chest WDL  Shoulder Blades WDL  Spine WDL  (R) Arm/Elbow/Hand WDL  (L) Arm/Elbow/Hand WDL  Abdomen WDL Suprapubic catheter insertion has light discharge  Groin Redness and Blanching Protective measures in place  Scrotum/Coccyx/Buttocks Redness and Blanching Protective measures in place  (R) Leg Redness, Blanching, Scab, and Abrasion  (L) Leg Redness, Blanching, Scab, Bruising, and Abrasion  (R) Heel/Foot/Toe Redness and Blanching Peeling dry skin  (L) Heel/Foot/Toe Redness, Blanching, Scar, and Scab Dressings in place, Peeling dry skin          Devices In Places Blood Pressure Cuff, Pulse Ox, and SCD's      Interventions In Place Heel Mepilex, Sacral Mepilex, Heel Float Boots, Pillows, Q2 Turns, Low Air Loss Mattress, and Barrier Cream    Possible Skin Injury Yes    Pictures Uploaded Into Epic N/A  Wound Consult Placed Yes  RN Wound Prevention Protocol Ordered Yes

## 2023-12-18 LAB
ANION GAP SERPL CALC-SCNC: 8 MMOL/L (ref 7–16)
BASOPHILS # BLD AUTO: 0.4 % (ref 0–1.8)
BASOPHILS # BLD: 0.02 K/UL (ref 0–0.12)
BUN SERPL-MCNC: 17 MG/DL (ref 8–22)
CALCIUM SERPL-MCNC: 8.8 MG/DL (ref 8.4–10.2)
CHLORIDE SERPL-SCNC: 109 MMOL/L (ref 96–112)
CK SERPL-CCNC: 52 U/L (ref 0–154)
CO2 SERPL-SCNC: 25 MMOL/L (ref 20–33)
CREAT SERPL-MCNC: 0.43 MG/DL (ref 0.5–1.4)
EKG IMPRESSION: NORMAL
EOSINOPHIL # BLD AUTO: 0.26 K/UL (ref 0–0.51)
EOSINOPHIL NFR BLD: 4.8 % (ref 0–6.9)
ERYTHROCYTE [DISTWIDTH] IN BLOOD BY AUTOMATED COUNT: 49.3 FL (ref 35.9–50)
GFR SERPLBLD CREATININE-BSD FMLA CKD-EPI: 112 ML/MIN/1.73 M 2
GLUCOSE SERPL-MCNC: 103 MG/DL (ref 65–99)
HCT VFR BLD AUTO: 36.6 % (ref 42–52)
HGB BLD-MCNC: 12.3 G/DL (ref 14–18)
IMM GRANULOCYTES # BLD AUTO: 0.09 K/UL (ref 0–0.11)
IMM GRANULOCYTES NFR BLD AUTO: 1.7 % (ref 0–0.9)
LYMPHOCYTES # BLD AUTO: 1.48 K/UL (ref 1–4.8)
LYMPHOCYTES NFR BLD: 27.2 % (ref 22–41)
MCH RBC QN AUTO: 33.9 PG (ref 27–33)
MCHC RBC AUTO-ENTMCNC: 33.6 G/DL (ref 32.3–36.5)
MCV RBC AUTO: 100.8 FL (ref 81.4–97.8)
MONOCYTES # BLD AUTO: 0.58 K/UL (ref 0–0.85)
MONOCYTES NFR BLD AUTO: 10.6 % (ref 0–13.4)
NEUTROPHILS # BLD AUTO: 3.02 K/UL (ref 1.82–7.42)
NEUTROPHILS NFR BLD: 55.3 % (ref 44–72)
NRBC # BLD AUTO: 0 K/UL
NRBC BLD-RTO: 0 /100 WBC (ref 0–0.2)
PLATELET # BLD AUTO: 179 K/UL (ref 164–446)
PMV BLD AUTO: 8.6 FL (ref 9–12.9)
POTASSIUM SERPL-SCNC: 3.9 MMOL/L (ref 3.6–5.5)
RBC # BLD AUTO: 3.63 M/UL (ref 4.7–6.1)
SODIUM SERPL-SCNC: 142 MMOL/L (ref 135–145)
WBC # BLD AUTO: 5.5 K/UL (ref 4.8–10.8)

## 2023-12-18 PROCEDURE — 700102 HCHG RX REV CODE 250 W/ 637 OVERRIDE(OP): Performed by: INTERNAL MEDICINE

## 2023-12-18 PROCEDURE — 36415 COLL VENOUS BLD VENIPUNCTURE: CPT

## 2023-12-18 PROCEDURE — 80048 BASIC METABOLIC PNL TOTAL CA: CPT

## 2023-12-18 PROCEDURE — 94760 N-INVAS EAR/PLS OXIMETRY 1: CPT

## 2023-12-18 PROCEDURE — A9270 NON-COVERED ITEM OR SERVICE: HCPCS | Performed by: INTERNAL MEDICINE

## 2023-12-18 PROCEDURE — 82550 ASSAY OF CK (CPK): CPT

## 2023-12-18 PROCEDURE — 93010 ELECTROCARDIOGRAM REPORT: CPT | Performed by: STUDENT IN AN ORGANIZED HEALTH CARE EDUCATION/TRAINING PROGRAM

## 2023-12-18 PROCEDURE — 700105 HCHG RX REV CODE 258: Performed by: INTERNAL MEDICINE

## 2023-12-18 PROCEDURE — 99233 SBSQ HOSP IP/OBS HIGH 50: CPT | Performed by: INTERNAL MEDICINE

## 2023-12-18 PROCEDURE — 85025 COMPLETE CBC W/AUTO DIFF WBC: CPT

## 2023-12-18 PROCEDURE — 700111 HCHG RX REV CODE 636 W/ 250 OVERRIDE (IP): Performed by: INTERNAL MEDICINE

## 2023-12-18 PROCEDURE — 770020 HCHG ROOM/CARE - TELE (206)

## 2023-12-18 PROCEDURE — 93005 ELECTROCARDIOGRAM TRACING: CPT | Performed by: INTERNAL MEDICINE

## 2023-12-18 RX ORDER — SENNOSIDES A AND B 8.6 MG/1
17.2 TABLET, FILM COATED ORAL 2 TIMES DAILY
Status: DISCONTINUED | OUTPATIENT
Start: 2023-12-18 | End: 2023-12-23 | Stop reason: HOSPADM

## 2023-12-18 RX ORDER — LACTULOSE 20 G/30ML
15 SOLUTION ORAL 2 TIMES DAILY
Status: DISCONTINUED | OUTPATIENT
Start: 2023-12-18 | End: 2023-12-19

## 2023-12-18 RX ORDER — DOCUSATE SODIUM 100 MG/1
200 CAPSULE, LIQUID FILLED ORAL 2 TIMES DAILY
Status: DISCONTINUED | OUTPATIENT
Start: 2023-12-18 | End: 2023-12-23 | Stop reason: HOSPADM

## 2023-12-18 RX ADMIN — BACLOFEN 20 MG: 10 TABLET ORAL at 21:27

## 2023-12-18 RX ADMIN — BACLOFEN 20 MG: 10 TABLET ORAL at 10:54

## 2023-12-18 RX ADMIN — Medication 400 MG: at 06:07

## 2023-12-18 RX ADMIN — MEROPENEM 500 MG: 500 INJECTION, POWDER, FOR SOLUTION INTRAVENOUS at 17:23

## 2023-12-18 RX ADMIN — DOCUSATE SODIUM 200 MG: 100 CAPSULE, LIQUID FILLED ORAL at 17:17

## 2023-12-18 RX ADMIN — MEROPENEM 500 MG: 500 INJECTION, POWDER, FOR SOLUTION INTRAVENOUS at 23:20

## 2023-12-18 RX ADMIN — SENNOSIDES 17.2 MG: 8.6 TABLET, FILM COATED ORAL at 17:17

## 2023-12-18 RX ADMIN — LACTULOSE 15 ML: 20 SOLUTION ORAL at 08:23

## 2023-12-18 RX ADMIN — LACTULOSE 15 ML: 20 SOLUTION ORAL at 17:24

## 2023-12-18 RX ADMIN — FERROUS SULFATE TAB 325 MG (65 MG ELEMENTAL FE) 325 MG: 325 (65 FE) TAB at 17:17

## 2023-12-18 RX ADMIN — DOCUSATE SODIUM 100 MG: 100 CAPSULE, LIQUID FILLED ORAL at 06:08

## 2023-12-18 RX ADMIN — LOSARTAN POTASSIUM 50 MG: 50 TABLET, FILM COATED ORAL at 17:17

## 2023-12-18 RX ADMIN — BACLOFEN 20 MG: 10 TABLET ORAL at 06:07

## 2023-12-18 RX ADMIN — HEPARIN SODIUM 5000 UNITS: 5000 INJECTION, SOLUTION INTRAVENOUS; SUBCUTANEOUS at 06:08

## 2023-12-18 RX ADMIN — SENNOSIDES 8.6 MG: 8.6 TABLET, FILM COATED ORAL at 06:08

## 2023-12-18 RX ADMIN — HEPARIN SODIUM 5000 UNITS: 5000 INJECTION, SOLUTION INTRAVENOUS; SUBCUTANEOUS at 21:28

## 2023-12-18 RX ADMIN — Medication 10 MG: at 21:27

## 2023-12-18 RX ADMIN — AMLODIPINE BESYLATE 5 MG: 5 TABLET ORAL at 06:08

## 2023-12-18 RX ADMIN — POLYETHYLENE GLYCOL 3350 1 PACKET: 17 POWDER, FOR SOLUTION ORAL at 06:08

## 2023-12-18 RX ADMIN — MEROPENEM 500 MG: 500 INJECTION, POWDER, FOR SOLUTION INTRAVENOUS at 01:19

## 2023-12-18 RX ADMIN — FERROUS SULFATE TAB 325 MG (65 MG ELEMENTAL FE) 325 MG: 325 (65 FE) TAB at 06:08

## 2023-12-18 RX ADMIN — MEROPENEM 500 MG: 500 INJECTION, POWDER, FOR SOLUTION INTRAVENOUS at 06:07

## 2023-12-18 RX ADMIN — VANCOMYCIN HYDROCHLORIDE 1750 MG: 5 INJECTION, POWDER, LYOPHILIZED, FOR SOLUTION INTRAVENOUS at 08:04

## 2023-12-18 RX ADMIN — MEROPENEM 500 MG: 500 INJECTION, POWDER, FOR SOLUTION INTRAVENOUS at 13:47

## 2023-12-18 RX ADMIN — BACLOFEN 20 MG: 10 TABLET ORAL at 17:17

## 2023-12-18 ASSESSMENT — ENCOUNTER SYMPTOMS
INSOMNIA: 0
ABDOMINAL PAIN: 0
BLURRED VISION: 0
CHILLS: 0
FEVER: 0
MYALGIAS: 0
HEADACHES: 0
DIARRHEA: 0
HEARTBURN: 0
CLAUDICATION: 0
SHORTNESS OF BREATH: 0
DIZZINESS: 0
NERVOUS/ANXIOUS: 0
SENSORY CHANGE: 0
PHOTOPHOBIA: 0
COUGH: 0
SPEECH CHANGE: 0
CONSTIPATION: 0
DEPRESSION: 0
VOMITING: 0
WEAKNESS: 0

## 2023-12-18 ASSESSMENT — PAIN DESCRIPTION - PAIN TYPE: TYPE: CHRONIC PAIN

## 2023-12-18 NOTE — WOUND TEAM
[facility]  Inpatient Services  Wound and Skin Care Follow-up    Admission Date: 12/11/2023     Last order of IP CONSULT TO WOUND CARE was found on 12/14/2023 from Hospital Encounter on 12/11/2023     HPI, PMH, SH: Reviewed    Past Surgical History:   Procedure Laterality Date    IRRIGATION & DEBRIDEMENT GENERAL Right 12/12/2023    Procedure: IRRIGATION AND DEBRIDEMENT, WOUND/BUTTOCKS;  Surgeon: Huan Bansal M.D.;  Location: SURGERY [facility];  Service: General    IRRIGATION & DEBRIDEMENT GENERAL Left 04/26/2022    Procedure: IRRIGATION AND DEBRIDEMENT, WOUND - LEG;  Surgeon: Eric Raman M.D.;  Location: SURGERY [facility];  Service: Orthopedics    WOUND CLOSURE NEURO Left 04/26/2022    Procedure: CLOSURE, WOUND;  Surgeon: Eric Raman M.D.;  Location: SURGERY [facility];  Service: Orthopedics    ORTHOPEDIC OSTEOTOMY Left 04/26/2022    Procedure: OSTECTOMY;  Surgeon: Eric Raman M.D.;  Location: [facility];  Service: Orthopedics    INCISION AND DRAINAGE ORTHOPEDIC Left 01/27/2022    Procedure: INCISION AND DRAINAGE, WOUND, BY ORTHOPEDICS;  Surgeon: Erasmo Stewart M.D.;  Location: [facility];  Service: Orthopedics    BONE BIOPSY Left 01/27/2022    Procedure: BIOPSY, BONE;  Surgeon: Erasmo Stewart M.D.;  Location: [facility];  Service: Orthopedics    INCISION AND DRAINAGE ORTHOPEDIC  01/22/2022    Procedure: INCISION AND DRAINAGE, WOUND, BY ORTHOPEDICS;  Surgeon: Erasmo Stewart M.D.;  Location: [facility];  Service: Orthopedics    AR CYSTOSCOPY,INSERT URETERAL STENT Left 01/04/2022    Procedure: CYSTOSCOPY, WITH URETERAL STENT INSERTION;  Surgeon: Osvaldo Baltazar M.D.;  Location: [facility];  Service: Urology    AR CYSTO/URETERO/PYELOSCOPY, DX Left 01/04/2022    Procedure: URETEROSCOPY;  Surgeon: Osvaldo Baltazar M.D.;  Location: [facility];  Service: Urology    LASERTRIPSY N/A 01/04/2022    Procedure:  LITHOTRIPSY, USING LASER;  Surgeon: Osvaldo Baltazar M.D.;  Location: [facility];  Service: Urology    WV CYSTOSCOPY,INSERT URETERAL STENT Left 2021    Procedure: CYSTOSCOPY, WITH URETERAL STENT INSERTION;  Surgeon: Aly Bowen M.D.;  Location: [facility];  Service: Urology    WV CYSTO/URETERO/PYELOSCOPY, DX Left 2021    Procedure: URETEROSCOPY;  Surgeon: Aly Bowen M.D.;  Location: [facility];  Service: Urology    LASERTRIPSY Left 2021    Procedure: LITHOTRIPSY, USING LASER;  Surgeon: Aly Bowen M.D.;  Location: [facility];  Service: Urology    IRRIGATION & DEBRIDEMENT GENERAL  2020    Procedure: IRRIGATION AND DEBRIDEMENT, WOUND SACRAL ULCER;  Surgeon: Matt Cummins M.D.;  Location: [facility];  Service: Plastics    ULCER DEBRIDEMENT N/A 2019    Procedure: debridement of Sacral grade 4 ulcer - W/BONE BIOPSY, 3 liter wash out. bilateral sliding gluteal myocutaneous flap advancement;  Surgeon: Amadeo Moon M.D.;  Location: [facility];  Service: Plastics    FLAP CLOSURE  2019    Procedure: CLOSURE, FLAP - MUSCLE;  Surgeon: Amadeo Moon M.D.;  Location: [facility];  Service: Plastics    COLOSTOMY N/A 2019    Procedure: CREATION, COLOSTOMY -  placement;  Surgeon: Elías Hannah M.D.;  Location: [facility];  Service: General    COLOSTOMY      COLOSTOMY TAKEDOWN      HERNIA REPAIR      ORIF, ANKLE      PERCUTANEOUOSPINNING LOWER EXTREMITY       Social History     Tobacco Use    Smoking status: Former     Current packs/day: 0.00     Average packs/day: 1 pack/day for 10.0 years (10.0 ttl pk-yrs)     Types: Cigarettes     Start date: 1967     Quit date: 1977     Years since quittin.9    Smokeless tobacco: Never   Substance Use Topics    Alcohol use: Yes     Alcohol/week: 4.2 oz     Types: 7 Standard drinks or equivalent per week      Comment: 2/day     Chief Complaint   Patient presents with    Fever     101.8 this afternoon       Diagnosis: Osteomyelitis (HCC) [M86.9]    Unit where seen by Wound Team: 2221/01     WOUND FOLLOW UP RELATED TO:  R buttock NPWT change       WOUND TEAM PLAN OF CARE - Frequency of Follow-up:   Nursing to follow dressing orders written for wound care. Contact wound team if area fails to progress, deteriorates or with any questions/concerns if something comes up before next scheduled follow up (See below as to whether wound is following and frequency of wound follow up)  Dressing changes by wound team:                   3 times weekly - R buttock     WOUND HISTORY:       Pt is being seen at [facility] wound clinic for Coccyx, R IT, LLE and L 2nd toe as well as HH.          WOUND ASSESSMENT/LDA  Wound 12/12/23 Incision Buttocks Right STAGE 4 (Active)   Date First Assessed/Time First Assessed: 12/12/23 2013   Primary Wound Type: Incision  Location: Buttocks  Laterality: Right  Wound Description (Comments): STAGE 4               12/15/23                                      Assessments 12/17/2023  3:30 PM   Site Assessment Red   Periwound Assessment Intact   Margins Defined edges   Closure Secondary intention   Drainage Amount Scant   Drainage Description Serosanguineous   Treatments Cleansed;Nonselective debridement   Wound Cleansing Approved Wound Cleanser   Periwound Protectant No-sting Skin Prep;Paste Ring;Drape   Dressing Status Intact   Dressing Changed Changed   Dressing Cleansing/Solutions Normal Saline   Dressing Options Wound Vac   Dressing Change/Treatment Frequency Tuesday, Thursday, Saturday, and As Needed   NEXT Weekly Photo (Inpatient Only) 12/21/23   Wound Team Following 3x Weekly   Non-staged Wound Description Full thickness   Shape oval   Wound Odor None       Negative Pressure Wound Therapy 12/15/23 Pressure injury: stage 4 Buttocks;Ischium Right (Active)   Placement Date: 12/15/23   Present on Original  Admission: No  Hand Hygiene Completed: Yes  Wound Type: Pressure injury: stage 4  Location: Buttocks;Ischium  Laterality: Right      Assessments 12/17/2023  3:30 PM   NPWT Pump Mode / Pressure Setting Continuous;Ulta;125 mmHg   Dressing Type Small;Black Foam (Veraflo)   Number of Foam Pieces Used 2   Canister Changed No   NEXT Dressing Change/Treatment Date 12/19/23   VAC VeraFlo Irrigant Normal Saline   VAC VeraFlo Soak Time (mins) 6   VAC VeraFlo Instill Volume (ml) 20   VAC VeraFlo - Therapy Time (hrs) 2.5   VAC VeraFlo Pressure (mm/Hg) Continuous;125 mmHg        Vascular:    JUAN MANUEL:   No results found.    Lab Values:    Lab Results   Component Value Date/Time    WBC 5.2 12/17/2023 02:35 AM    RBC 3.56 (L) 12/17/2023 02:35 AM    HEMOGLOBIN 12.1 (L) 12/17/2023 02:35 AM    HEMATOCRIT 36.3 (L) 12/17/2023 02:35 AM    CREACTPROT 19.12 (H) 12/12/2023 09:39 PM    SEDRATEWES 23 (H) 04/01/2022 02:26 PM         Culture Results show:  Recent Results (from the past 720 hour(s))   CULTURE WOUND W/ GRAM STAIN    Collection Time: 12/12/23  5:23 AM    Specimen: Perianal; Wound   Result Value Ref Range    Significant Indicator NEG     Source WND     Site PERIANAL     Culture Result       Moderate growth usual site specdific ade including Proteus  sp. and Group D Enterococcus sp.      Gram Stain Result       Many WBCs.  Many Gram positive cocci in chains.  Few Gram negative rods.         Pain Level/Medicated:  Patient denies pain       INTERVENTIONS BY WOUND TEAM:  Chart and images reviewed. Discussed with bedside RN. All areas of concern (based on picture review, LDA review and discussion with bedside RN) have been thoroughly assessed. Documentation of areas based on significant findings. This RN in to assess patient. Performed standard wound care which includes appropriate positioning, dressing removal and non-selective debridement. Pictures and measurements obtained weekly if/when required.    Wound:  R IT  Preparation for  Dressing removal: Dressing soaked with saline  Cleansed/Non-selectively Debrided with:  Normal Saline and Gauze  Raeann wound: Cleansed with Normal Saline and Gauze, Prepped with No Sting, Paste Rings, and Drape  Primary Dressing:  Black foam x1 piece into wound with button applied  Secondary (Outer) Dressing: Sealed with TRAC pad, sacral offloading drsgs to R hip to offload tubing    Advanced Wound Care Discharge Planning  Number of Clinicians necessary to complete wound care: 1  Is patient requiring IV pain medications for dressing changes:  No   Length of time for dressing change 30 min. (This does not include chart review, pre-medication time, set up, clean up or time spent charting.)    Interdisciplinary consultation: Patient, Bedside RN (Vanessa)    EVALUATION / RATIONALE FOR TREATMENT:     Date:  12/17/23  Wound Status:  Wound improving    Pt had I&D with VAC placement last week. Wound bed red. Continuing with VF NPWT.  Date:  12/15/23  Wound Status:  Initial evaluation to Right IT/Buttock post debridement; Coccyx and LLE wounds progressing as anticipated.     Right IT/Buttock with majority red granular tissue, scant yellow slough with visible muscle and palpable bone. Scant serosanguinous drainage, undermining cirmferential with tunnel at 9 o'clock. NPWT with veraflow and normal saline applied to assist with wound closure by secondary intention, management of bio-burden and exudate through mechanical debridement, and increase oxygenation and granulation tissue production to wound bed.      Coccyx wound with red granular tissue and scant drainage, scar tissue present to periwound. LLE shallow with majority dry scabs. Aquacel Ag Hydrofiber applied to manage bioburden, absorb exudate, and maintain a moist wound environment without laterally wicking exudate therefore reducing raeann-wound maceration.    Date:  12/12/23  Wound Status:  Initial evaluation    Per Dr Bansal note pt to go to [facility] for debridement of R IT  wound, so assessed wound and placed wet-dry drsg. Per pt possibility of VAC placement.  Coccyx wounds are small with scar tissue present. LLE shallow with scant drainage. Aquacel Ag Hydrofiber applied to manage bioburden, absorb exudate, and maintain a moist wound environment without laterally wicking exudate therefore reducing shira-wound maceration.    Nsg had changed colostomy pouching system yesterday and it is intact. Supplies at bedside. Stoma red with brown soft output.            Goals: Steady decrease in wound area and depth weekly.    NURSING PLAN OF CARE ORDERS:  No new orders this visit    NUTRITION RECOMMENDATIONS   Wound Team Recommendations:  N/A     DIET ORDERS (From admission to next 24h)       Start     Ordered    12/14/23 1041  Supplements  ONCE        Question:  Which Supplement  Answer:  Per RD  Comment:  TID Boost Glucose Control; BID Arginaid    12/14/23 1041    12/13/23 0755  Diet Order Diet: Regular  ALL MEALS        Question:  Diet:  Answer:  Regular    12/13/23 0755                    PREVENTATIVE INTERVENTIONS:   Q shift Luis Enrique - performed per nursing policy  Q shift pressure point assessments - performed per nursing policy    Surface/Positioning  Low Airloss - Currently in Place  Reposition q 2 hours - Currently in Place  TAPs Turning system - Currently in Place    Offloading/Redistribution  Sacral offloading dressing (Silicone dressing) - Currently in Place  Heel float boots (Prevalon boot) - Currently in Place      Containment/Moisture Prevention    Fecal ostomy - Currently in Place  Suprapubic cath - Currently in Place    Anticipated discharge plans:  TBD        Vac Discharge Needs:  Vac Discharge plan is purely a recommendation from wound team and not a requirement for discharge unless otherwise stated by physician.  Veraflo Vac while inpatient, ok to transition to Regular Vac on discharge
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
## 2023-12-18 NOTE — PROGRESS NOTES
BSSR received from day shift RN. Patient laying in bed in no apparent distress with no complaints. A&O X4. Plan of care discussed with patient. Bed in low position with bed brakes applied and call light in reach. Patient states no needs at this time.

## 2023-12-18 NOTE — CARE PLAN
The patient is Stable - Low risk of patient condition declining or worsening    Shift Goals Pt will be turned Q 2 hours and PRN t/o day shift  Clinical Goals: Patient will tolerate wound care at pain level 3/10 during dressing change / day shift  Patient Goals: rets / sleep in 1 hour intervals uninterrupted during day shift   Family Goals: n/a    Progress made toward(s) clinical / shift goals:  wound vac drsg changed, pt reported pain levl 3/10    Patient is not progressing towards the following goals:

## 2023-12-18 NOTE — CARE PLAN
The patient is Stable - Low risk of patient condition declining or worsening    Shift Goals  Clinical Goals: Patient will tolerate Q2 turns with no further skin breakdown  Patient Goals: see wound care  Family Goals: n/a    Progress made toward(s) clinical / shift goals:  Patient has refused some of his turns overnight but skin integrity is maintained    Patient is not progressing towards the following goals:

## 2023-12-18 NOTE — DISCHARGE PLANNING
Case Management Discharge Planning    Admission Date: 12/11/2023  GMLOS: 7.8  ALOS: 6    6-Clicks ADL Score: 6  6-Clicks Mobility Score: 6  PT and/or OT Eval ordered: Yes  Post-acute Referrals Ordered: Yes  Post-acute Choice Obtained: NA  Has referral(s) been sent to post-acute provider:  Yes      Anticipated Discharge Dispo: Discharge Disposition: D/T to SNF with Medicare cert in anticipation of skilled care (03)    DME Needed: No    Action(s) Taken: Per morning rounds, pt cleared to DC to [facility] today after PICC line placement.    Spoke to Peyton from [facility]. Per Peyton they will retract acceptance as pt does not currently have qualifying stay of 3 tele/ICU MNs.    RNCM sent SNF referral to local SNFs per protocol.      Escalations Completed: None    Medically Clear: Yes    Next Steps: f/u with DPA regarding SNF acceptance    Barriers to Discharge: Pending Placement

## 2023-12-18 NOTE — PROGRESS NOTES
Pharmacy Vancomycin Kinetics Note for 12/17/2023     73 y.o. male on Vancomycin day # 5     Vancomycin Indication (AUC Dosing): Osteomyelitis      Provider specified end date: 12/27/23    Active Antibiotics (From admission, onward)      Ordered     Ordering Provider       Thu Dec 14, 2023  1:40 PM    12/14/23 1340  vancomycin 1750 mg/500mL NS IVPB premix  (vancomycin (VANCOCIN) IV (LD + Maintenance))  EVERY 18 HOURS         Rosmery Crabtree M.D.       Wed Dec 13, 2023 11:43 AM    12/13/23 1143  MD Alert...Vancomycin per Pharmacy  PHARMACY TO DOSE        Question:  Indication(s) for vancomycin?  Answer:  Osteomyelitis    Rosmery Crabtree M.D.       Tue Dec 12, 2023  9:41 PM    12/12/23 2141  meropenem (Merrem) 500 mg in  mL IV-MBP  EVERY 6 HOURS        Question:  Indication  Answer:  Skin and soft tissue infection    Rosmery Crabtree M.D.            Dosing Weight: 102 kg (224 lb 13.9 oz)      Admission History: Admitted on 12/11/2023 for Osteomyelitis (HCC) [M86.9]  Pertinent history: R ischial decub ulcer with osteo. hx of MRSA in wound 9/2023    Allergies:     Sulfa drugs     Pertinent cultures to date:     Results       Procedure Component Value Units Date/Time    AFB Culture [[id number]] Collected: 12/12/23 2004    Order Status: Completed Specimen: Tissue Updated: 12/17/23 1529     Significant Indicator NEG     Source TISS     Site Left buttock wound     Culture Result Culture discontinued due to excessive contamination.     AFB Smear Results No acid fast bacilli seen.    Narrative:      A Left buttock wound  Surgery Specimen    Fungal Culture [[id number]] Collected: 12/12/23 2004    Order Status: Completed Specimen: Tissue Updated: 12/17/23 1529     Significant Indicator NEG     Source TISS     Site Left buttock wound     Culture Result Culture in progress.     Fungal Smear Results No fungal elements seen.    Narrative:      A Left buttock wound  Surgery Specimen    Anaerobic Culture [[id number]]   "(Abnormal) Collected: 12/12/23 2004    Order Status: Completed Specimen: Tissue Updated: 12/17/23 1529     Significant Indicator POS     Source TISS     Site Left buttock wound     Culture Result -      Prevotella bivia  Moderate growth      Narrative:      A Left buttock wound  Surgery Specimen    CULTURE TISSUE W/ GRM STAIN [[id number]]  (Abnormal)  (Susceptibility) Collected: 12/12/23 2004    Order Status: Completed Specimen: Tissue Updated: 12/17/23 1529     Significant Indicator POS     Source TISS     Site Left buttock wound     Culture Result -     Gram Stain Result Moderate WBCs.  Moderate Gram positive cocci.  Few Gram positive rods.  Few Gram negative rods.       Culture Result Enterococcus faecalis  Heavy growth        Proteus mirabilis ESBL  Moderate growth  Extended Spectrum Beta-lactamase (ESBL) isolated.  ESBL's may be clinically resistant to therapy with  Penicillins,Cephalosporins or Aztreonam despite  apparent in vitro susceptibility to some of these agents.  The patient requires contact isolation.  Please contact pharmacy or an Infectious Disease Specialist  if you have any questions about appropriate therapy.      Narrative:      A Left buttock wound  Surgery Specimen    BLOOD CULTURE [[id number]] Collected: 12/11/23 1927    Order Status: Completed Specimen: Blood from Peripheral Updated: 12/16/23 2017     Significant Indicator NEG     Source BLD     Site PERIPHERAL     Culture Result No growth after 5 days of incubation.  Blood culture testing and Gram stain, if indicated, are  performed at [facility], [address] [place] [address].  Positive blood cultures are  sent to [facility], [address] [place] [address], for organism identification and  susceptibility testing.      Narrative:      Per Hospital Policy: Only change Specimen Src: to \"Line\" if  specified by physician order.  Right Forearm/Arm    BLOOD CULTURE [[id number]] Collected: 12/11/23 " "1927    Order Status: Completed Specimen: Blood from Peripheral Updated: 12/16/23 2017     Significant Indicator NEG     Source BLD     Site PERIPHERAL     Culture Result No growth after 5 days of incubation.  Blood culture testing and Gram stain, if indicated, are  performed at [facility], [address] [place] [address].  Positive blood cultures are  sent to [facility], [address] [place] [address], for organism identification and  susceptibility testing.      Narrative:      Per Hospital Policy: Only change Specimen Src: to \"Line\" if  specified by physician order.  Left AC    CULTURE WOUND W/ GRAM STAIN [[id number]] Collected: 12/12/23 0523    Order Status: Completed Specimen: Wound from Perianal Updated: 12/16/23 1037     Significant Indicator NEG     Source WND     Site PERIANAL     Culture Result Moderate growth usual site specdific ade including Proteus  sp. and Group D Enterococcus sp.       Gram Stain Result Many WBCs.  Many Gram positive cocci in chains.  Few Gram negative rods.      Narrative:      Swab received    GRAM STAIN [[id number]]  (Abnormal) Collected: 12/12/23 2004    Order Status: Completed Specimen: Tissue Updated: 12/13/23 1819     Significant Indicator .     Source TISS     Site Left buttock wound     Gram Stain Result Moderate WBCs.  Moderate Gram positive cocci.  Few Gram positive rods.  Few Gram negative rods.      Narrative:      A Left buttock wound  Surgery Specimen    Fungal Smear [[id number]] Collected: 12/12/23 2004    Order Status: Completed Specimen: Tissue Updated: 12/13/23 1819     Significant Indicator NEG     Source TISS     Site Left buttock wound     Fungal Smear Results No fungal elements seen.    Narrative:      A Left buttock wound  Surgery Specimen    Acid Fast Stain [[id number]] Collected: 12/12/23 2004    Order Status: Completed Specimen: Tissue Updated: 12/13/23 1819     Significant Indicator NEG     Source TISS     " Site Left buttock wound     AFB Smear Results No acid fast bacilli seen.    Narrative:      A Left buttock wound  Surgery Specimen    GRAM STAIN [[id number]] Collected: 12/12/23 0523    Order Status: Completed Specimen: Wound Updated: 12/12/23 1454     Significant Indicator .     Source WND     Site PERIANAL     Gram Stain Result Many WBCs.  Many Gram positive cocci in chains.  Few Gram negative rods.      Narrative:      Swab received    MRSA By PCR (Amp) [[id number]] Collected: 12/12/23 0523    Order Status: Completed Specimen: Respirate from Nares Updated: 12/12/23 1235     MRSA by PCR Negative    Narrative:      Collected By: [id number] RICARDO MAYBERRY    CoV-2, FLU A/B, and RSV by PCR (2-4 Hours CEPTonZofID) : Collect NP swab in VTM [[id number]] Collected: 12/11/23 1938    Order Status: Completed Specimen: Respirate Updated: 12/11/23 2101     Influenza virus A RNA Negative     Influenza virus B, PCR Negative     RSV, PCR Negative     SARS-CoV-2 by PCR NotDetected     Comment: [facility] providers: PLEASE REFER TO DE-ESCALATION AND RETESTING PROTOCOL  on [email or web address]    **The [employer] GeneXpert Xpress SARS-CoV-2 RT-PCR Test has been made  available for use under the Emergency Use Authorization (EUA) only.          SARS-CoV-2 Source NP Swab    URINALYSIS CULTURE, IF INDICATED [[id number]]  (Abnormal) Collected: 12/11/23 2014    Order Status: Completed Specimen: Urine, Suprapubic Updated: 12/11/23 2043     Color Yellow     Character Clear     Specific Gravity 1.025     Ph 6.0     Glucose Negative mg/dL      Ketones 40 mg/dL      Protein 100 mg/dL      Bilirubin Negative     Nitrite Negative     Leukocyte Esterase Negative     Occult Blood Small     Micro Urine Req Microscopic    Narrative:      Indication for culture:->Evaluation for sepsis without a  clear source of infection            Labs:     Estimated Creatinine Clearance: 201.9 mL/min (A) (by C-G formula based on SCr of 0.39 mg/dL (L)).  Recent Labs     12/15/23  0016  "23  0216 23  0235   WBC 5.3 5.4 5.2     Recent Labs     12/15/23  0016 23  0216 23  0235   BUN 16 14 15   CREATININE 0.50 0.54 0.39*   ALBUMIN  --   --  2.8*       Intake/Output Summary (Last 24 hours) at 2023 1625  Last data filed at 2023 1300  Gross per 24 hour   Intake 700 ml   Output 4800 ml   Net -4100 ml      BP (!) 172/75   Pulse (!) 50   Temp 36.7 °C (98 °F) (Temporal)   Resp 16   Ht 1.778 m (5' 10\")   Wt 102 kg (225 lb)   SpO2 91%  Temp (24hrs), Av.7 °C (98 °F), Min:36.5 °C (97.7 °F), Max:36.8 °C (98.3 °F)      List concerns for Vancomycin clearance:     BUN/Scr ratio greater than 20:1;Obesity;Age    Pharmacokinetics:     Trough kinetics:   Recent Labs     23  1302   VANCOTROUGH 15.0     Predicted  based on trough,     A/P:     -  Vancomycin dose: 1750 mg q 18 hours        -  Next vancomycin level(s):    -Not ordered at this time, will monitor and order as indicated    - Calculated AUC (from trough): 455    -  Comments: will continue to monitor and follow cultures and ID recommendations    Kayla Lopez, PharmD    "

## 2023-12-19 ENCOUNTER — APPOINTMENT (OUTPATIENT)
Dept: RADIOLOGY | Facility: MEDICAL CENTER | Age: 73
DRG: 500 | End: 2023-12-19
Attending: INTERNAL MEDICINE
Payer: MEDICARE

## 2023-12-19 PROCEDURE — 700111 HCHG RX REV CODE 636 W/ 250 OVERRIDE (IP): Performed by: INTERNAL MEDICINE

## 2023-12-19 PROCEDURE — 99451 NTRPROF PH1/NTRNET/EHR 5/>: CPT | Mod: MISDOCU | Performed by: INTERNAL MEDICINE

## 2023-12-19 PROCEDURE — 700101 HCHG RX REV CODE 250: Performed by: INTERNAL MEDICINE

## 2023-12-19 PROCEDURE — 700102 HCHG RX REV CODE 250 W/ 637 OVERRIDE(OP): Performed by: INTERNAL MEDICINE

## 2023-12-19 PROCEDURE — A9270 NON-COVERED ITEM OR SERVICE: HCPCS | Performed by: INTERNAL MEDICINE

## 2023-12-19 PROCEDURE — 770020 HCHG ROOM/CARE - TELE (206)

## 2023-12-19 PROCEDURE — 93005 ELECTROCARDIOGRAM TRACING: CPT | Performed by: INTERNAL MEDICINE

## 2023-12-19 PROCEDURE — 700105 HCHG RX REV CODE 258: Performed by: INTERNAL MEDICINE

## 2023-12-19 PROCEDURE — 36573 INSJ PICC RS&I 5 YR+: CPT

## 2023-12-19 PROCEDURE — 05HY33Z INSERTION OF INFUSION DEVICE INTO UPPER VEIN, PERCUTANEOUS APPROACH: ICD-10-PCS | Performed by: INTERNAL MEDICINE

## 2023-12-19 PROCEDURE — 94760 N-INVAS EAR/PLS OXIMETRY 1: CPT

## 2023-12-19 PROCEDURE — 302098 PASTE RING (FLAT): Performed by: INTERNAL MEDICINE

## 2023-12-19 PROCEDURE — 99233 SBSQ HOSP IP/OBS HIGH 50: CPT | Performed by: INTERNAL MEDICINE

## 2023-12-19 PROCEDURE — 97605 NEG PRS WND THER DME<=50SQCM: CPT

## 2023-12-19 RX ORDER — SODIUM CHLORIDE 9 MG/ML
1000 INJECTION, SOLUTION INTRAVENOUS ONCE
Status: COMPLETED | OUTPATIENT
Start: 2023-12-19 | End: 2023-12-19

## 2023-12-19 RX ADMIN — FERROUS SULFATE TAB 325 MG (65 MG ELEMENTAL FE) 325 MG: 325 (65 FE) TAB at 17:41

## 2023-12-19 RX ADMIN — Medication 10 MG: at 20:13

## 2023-12-19 RX ADMIN — SODIUM CHLORIDE 1000 ML: 9 INJECTION, SOLUTION INTRAVENOUS at 08:55

## 2023-12-19 RX ADMIN — AMLODIPINE BESYLATE 5 MG: 5 TABLET ORAL at 05:41

## 2023-12-19 RX ADMIN — BACLOFEN 20 MG: 10 TABLET ORAL at 05:40

## 2023-12-19 RX ADMIN — VANCOMYCIN HYDROCHLORIDE 1750 MG: 5 INJECTION, POWDER, LYOPHILIZED, FOR SOLUTION INTRAVENOUS at 20:12

## 2023-12-19 RX ADMIN — VANCOMYCIN HYDROCHLORIDE 1750 MG: 5 INJECTION, POWDER, LYOPHILIZED, FOR SOLUTION INTRAVENOUS at 01:47

## 2023-12-19 RX ADMIN — BACLOFEN 20 MG: 10 TABLET ORAL at 17:41

## 2023-12-19 RX ADMIN — DOCUSATE SODIUM 200 MG: 100 CAPSULE, LIQUID FILLED ORAL at 05:40

## 2023-12-19 RX ADMIN — DOCUSATE SODIUM 200 MG: 100 CAPSULE, LIQUID FILLED ORAL at 17:41

## 2023-12-19 RX ADMIN — LOSARTAN POTASSIUM 50 MG: 50 TABLET, FILM COATED ORAL at 17:41

## 2023-12-19 RX ADMIN — BACLOFEN 20 MG: 10 TABLET ORAL at 20:12

## 2023-12-19 RX ADMIN — SENNOSIDES 17.2 MG: 8.6 TABLET, FILM COATED ORAL at 17:41

## 2023-12-19 RX ADMIN — Medication 400 MG: at 05:41

## 2023-12-19 RX ADMIN — SENNOSIDES 17.2 MG: 8.6 TABLET, FILM COATED ORAL at 05:41

## 2023-12-19 RX ADMIN — HEPARIN SODIUM 5000 UNITS: 5000 INJECTION, SOLUTION INTRAVENOUS; SUBCUTANEOUS at 20:13

## 2023-12-19 RX ADMIN — BACLOFEN 20 MG: 10 TABLET ORAL at 12:09

## 2023-12-19 RX ADMIN — MEROPENEM 500 MG: 500 INJECTION, POWDER, FOR SOLUTION INTRAVENOUS at 05:44

## 2023-12-19 RX ADMIN — LACTULOSE 15 ML: 20 SOLUTION ORAL at 05:44

## 2023-12-19 RX ADMIN — MEROPENEM 500 MG: 500 INJECTION, POWDER, FOR SOLUTION INTRAVENOUS at 12:09

## 2023-12-19 RX ADMIN — POLYETHYLENE GLYCOL 3350 1 PACKET: 17 POWDER, FOR SOLUTION ORAL at 05:40

## 2023-12-19 RX ADMIN — FERROUS SULFATE TAB 325 MG (65 MG ELEMENTAL FE) 325 MG: 325 (65 FE) TAB at 05:41

## 2023-12-19 RX ADMIN — HEPARIN SODIUM 5000 UNITS: 5000 INJECTION, SOLUTION INTRAVENOUS; SUBCUTANEOUS at 05:41

## 2023-12-19 ASSESSMENT — ENCOUNTER SYMPTOMS
FEVER: 0
CLAUDICATION: 0
COUGH: 0
PHOTOPHOBIA: 0
HEADACHES: 0
WEAKNESS: 0
SHORTNESS OF BREATH: 0
BLURRED VISION: 0
CONSTIPATION: 0
CHILLS: 0
DIZZINESS: 0
DIARRHEA: 0
INSOMNIA: 0
SPEECH CHANGE: 0
HEARTBURN: 0
DEPRESSION: 0
MYALGIAS: 0
VOMITING: 0
SENSORY CHANGE: 0
ABDOMINAL PAIN: 0
NERVOUS/ANXIOUS: 0

## 2023-12-19 ASSESSMENT — PAIN DESCRIPTION - PAIN TYPE
TYPE: CHRONIC PAIN
TYPE: CHRONIC PAIN

## 2023-12-19 NOTE — PROCEDURES
PICC Insertion Final 3CG    Consents confirmed, vessel patency confirmed with ultrasound. Risks and benefits of procedure explained to patient/family and education regarding central line associated bloodstream infections provided. Questions answered.     PICC placed in RUE per MD order with ultrasound guidance. 4 Fr, single lumen PICC placed in basilic vein after one attempt(s). 5 cc's of 1% lidocaine injected intradermally, 21 gauge microintroducer needle and modified Seldinger technique used. 47 cm catheter inserted with good blood return. Secured at 1 cm marker. Each lumen flushed without resistance with 10 mL 0.9% normal saline. PICC line secured with Biopatch and Tegaderm.    PICC placement is confirmed by nurse using 3CG technology. PICC line is appropriate for use at this time. Pt tolerated procedure well.  Patient condition relayed to unit RN or ordering physician via this post procedure note in the EMR.     [employer] Power PICC ref # [id number], Lot # [id number]

## 2023-12-19 NOTE — DIETARY
"Nutrition Update:    Day 7 of admit.  Osvaldo Pate is a 73 y.o. male with admitting DX of Osteomyelitis (HCC) [M86.9].  Patient being followed to optimize nutrition.    Current Diet: Regular; Boost Glucose Control BID, Arginaid BID. PO intake per ADLs has been % of meals. Per RN, pt is drinking all supplements. Per most recent wound team note on 12/17, \"wound improving.\"    Problem: Nutritional:  Goal: Achieve adequate nutritional intake  Description: Patient will consume % of meals and supplements  Outcome: Met    RD will screen weekly per department policy; available PRN.     "

## 2023-12-19 NOTE — PROGRESS NOTES
BSSR received from day shift RN. Patient laying in bed in no apparent distress with no complaints. A&O X4. Plan of care discussed with patient. Bed in low position with bed brakes applied and call light in reach. Patient states no needs at this time.       2330- Tele informed patient was having frequent pauses. EKG done. No changes from baseline, continue to monitor for other vital changes.    0430- Tele informed that patient HR was in the low 40's touching into the high 30's Patient asymptomatic. Patient woken up. Alert and oriented and denies any issues. Dr Almonte informed of patients condition.

## 2023-12-19 NOTE — DISCHARGE PLANNING
Case Management Discharge Planning    Admission Date: 12/11/2023  GMLOS: 7.8  ALOS: 7    6-Clicks ADL Score: 6  6-Clicks Mobility Score: 6  PT and/or OT Eval ordered: Yes  Post-acute Referrals Ordered: Yes  Post-acute Choice Obtained: Yes  Has referral(s) been sent to post-acute provider:  Yes      Anticipated Discharge Dispo: Discharge Disposition: D/T to SNF with Medicare cert in anticipation of skilled care (03)  [facility]    DME Needed: n/a    Action(s) Taken: Discussed with IDT, per MD, he consulted with cardiology and not very concerned with bradycardia. Pt is in Tele day #2.    CM LVM for Ericka at [facility] for follow up if they can accept pt.     Escalations Completed: n/a    Medically Clear: no    Next Steps: follow up with Ericka at [facility]    Barriers to Discharge: pending acceptance by [facility]    Is the patient up for discharge tomorrow: hopefully by tomorrow.

## 2023-12-19 NOTE — PROGRESS NOTES
Monitor Summary:    Rhythm:  SB, 1st degree AVB, BBB  Rate Range:  43-54  Ectopy: rPVC, fPAC    Measurements:  0.22/0.14/0.54  (Measured by monitor tech)

## 2023-12-19 NOTE — PROGRESS NOTES
Tele strip at 1318 shows SB 52.      Measurements from am strip were as follows:  ND=0.22 First Degree AVB  QRS=0.16 BBB  QT=0.46    Tele Shift Summary:    Rhythm : SB  Rate : 50-54  Ectopy : Per CCT Jaeck, pt had frequent PVCs, occasional PACs, AI.     Telemetry monitoring strips placed in pt's chart.

## 2023-12-19 NOTE — CARE PLAN
The patient is Stable - Low risk of patient condition declining or worsening    Shift Goals  Clinical Goals: Pain to remain at a satisfactory level  Patient Goals: Sleep/rest overnight  Family Goals: n/a    Progress made toward(s) clinical / shift goals:  Patient has had no issues with pain overnight    Patient is not progressing towards the following goals:

## 2023-12-19 NOTE — DISCHARGE PLANNING
note:  Received a call from Ericka from [facility], she confirmed acceptance but not until Thursday. She will follow if they have a bed.

## 2023-12-19 NOTE — PROGRESS NOTES
Brief Cardiology Note:    I was called to discuss this patients care with Dr. Meier. We discussed 73 year old man with hx a. Fib, s/p Watchman device with loop recorder implanted last checked 11/20/23 without any events.  ECG dated 112/18/23 per my interpretation is sinus adriano in 40s, RBBB, no significant changes from prior (typically pulse runs in 40s).  Patient admitted for other reasons, infected decubitus ulcers.  No indication for pacemaker at this time.  Can fu with cardiology for routine loop recorder checks.    At this time it was deemed no formal in person cardiology consultation was necessary, however if this changes due to changes in patient condition or abnormal test results, please re-consult cardiology.     Electronically Signed by:  Teresa Luis MD  12/19/2023  8:28 AM
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
## 2023-12-19 NOTE — ASSESSMENT & PLAN NOTE
This was when the patient was sleeping and it seems that he remained asymptomatic.  I did consult cardiology and did not recommend further workup at this time and just follow-up as an outpatient.  Patient already follows with his cardiologist.  As per cardiology the patient already with a loop recorder.  Discussed with cardiology, no concern of bradycardia, stop telemetry monitoring  No EKG unless HR less than 35

## 2023-12-20 ENCOUNTER — APPOINTMENT (OUTPATIENT)
Dept: WOUND CARE | Facility: MEDICAL CENTER | Age: 73
End: 2023-12-20
Attending: INTERNAL MEDICINE
Payer: MEDICARE

## 2023-12-20 LAB
ALBUMIN SERPL BCP-MCNC: 3 G/DL (ref 3.2–4.9)
ALBUMIN/GLOB SERPL: 1 G/DL
ALP SERPL-CCNC: 64 U/L (ref 30–99)
ALT SERPL-CCNC: 20 U/L (ref 2–50)
ANION GAP SERPL CALC-SCNC: 8 MMOL/L (ref 7–16)
AST SERPL-CCNC: 16 U/L (ref 12–45)
BASOPHILS # BLD AUTO: 0.6 % (ref 0–1.8)
BASOPHILS # BLD: 0.03 K/UL (ref 0–0.12)
BILIRUB SERPL-MCNC: 0.3 MG/DL (ref 0.1–1.5)
BUN SERPL-MCNC: 17 MG/DL (ref 8–22)
CALCIUM ALBUM COR SERPL-MCNC: 9.4 MG/DL (ref 8.5–10.5)
CALCIUM SERPL-MCNC: 8.6 MG/DL (ref 8.4–10.2)
CHLORIDE SERPL-SCNC: 109 MMOL/L (ref 96–112)
CK SERPL-CCNC: 52 U/L (ref 0–154)
CO2 SERPL-SCNC: 24 MMOL/L (ref 20–33)
CREAT SERPL-MCNC: 0.48 MG/DL (ref 0.5–1.4)
EKG IMPRESSION: NORMAL
EOSINOPHIL # BLD AUTO: 0.26 K/UL (ref 0–0.51)
EOSINOPHIL NFR BLD: 5 % (ref 0–6.9)
ERYTHROCYTE [DISTWIDTH] IN BLOOD BY AUTOMATED COUNT: 50.4 FL (ref 35.9–50)
GFR SERPLBLD CREATININE-BSD FMLA CKD-EPI: 109 ML/MIN/1.73 M 2
GLOBULIN SER CALC-MCNC: 3 G/DL (ref 1.9–3.5)
GLUCOSE SERPL-MCNC: 92 MG/DL (ref 65–99)
HCT VFR BLD AUTO: 36.5 % (ref 42–52)
HGB BLD-MCNC: 11.9 G/DL (ref 14–18)
IMM GRANULOCYTES # BLD AUTO: 0.07 K/UL (ref 0–0.11)
IMM GRANULOCYTES NFR BLD AUTO: 1.3 % (ref 0–0.9)
LYMPHOCYTES # BLD AUTO: 1.28 K/UL (ref 1–4.8)
LYMPHOCYTES NFR BLD: 24.7 % (ref 22–41)
MCH RBC QN AUTO: 33.3 PG (ref 27–33)
MCHC RBC AUTO-ENTMCNC: 32.6 G/DL (ref 32.3–36.5)
MCV RBC AUTO: 102.2 FL (ref 81.4–97.8)
MONOCYTES # BLD AUTO: 0.58 K/UL (ref 0–0.85)
MONOCYTES NFR BLD AUTO: 11.2 % (ref 0–13.4)
NEUTROPHILS # BLD AUTO: 2.97 K/UL (ref 1.82–7.42)
NEUTROPHILS NFR BLD: 57.2 % (ref 44–72)
NRBC # BLD AUTO: 0 K/UL
NRBC BLD-RTO: 0 /100 WBC (ref 0–0.2)
PLATELET # BLD AUTO: 187 K/UL (ref 164–446)
PMV BLD AUTO: 9 FL (ref 9–12.9)
POTASSIUM SERPL-SCNC: 4.2 MMOL/L (ref 3.6–5.5)
PROT SERPL-MCNC: 6 G/DL (ref 6–8.2)
RBC # BLD AUTO: 3.57 M/UL (ref 4.7–6.1)
SODIUM SERPL-SCNC: 141 MMOL/L (ref 135–145)
WBC # BLD AUTO: 5.2 K/UL (ref 4.8–10.8)

## 2023-12-20 PROCEDURE — 700102 HCHG RX REV CODE 250 W/ 637 OVERRIDE(OP): Performed by: INTERNAL MEDICINE

## 2023-12-20 PROCEDURE — A9270 NON-COVERED ITEM OR SERVICE: HCPCS | Performed by: INTERNAL MEDICINE

## 2023-12-20 PROCEDURE — 99233 SBSQ HOSP IP/OBS HIGH 50: CPT | Performed by: INTERNAL MEDICINE

## 2023-12-20 PROCEDURE — 82550 ASSAY OF CK (CPK): CPT

## 2023-12-20 PROCEDURE — 85025 COMPLETE CBC W/AUTO DIFF WBC: CPT

## 2023-12-20 PROCEDURE — 94760 N-INVAS EAR/PLS OXIMETRY 1: CPT

## 2023-12-20 PROCEDURE — 700111 HCHG RX REV CODE 636 W/ 250 OVERRIDE (IP): Performed by: INTERNAL MEDICINE

## 2023-12-20 PROCEDURE — 700111 HCHG RX REV CODE 636 W/ 250 OVERRIDE (IP): Mod: JZ | Performed by: INTERNAL MEDICINE

## 2023-12-20 PROCEDURE — 700105 HCHG RX REV CODE 258: Performed by: INTERNAL MEDICINE

## 2023-12-20 PROCEDURE — 770020 HCHG ROOM/CARE - TELE (206)

## 2023-12-20 PROCEDURE — 80053 COMPREHEN METABOLIC PANEL: CPT

## 2023-12-20 PROCEDURE — 93010 ELECTROCARDIOGRAM REPORT: CPT | Performed by: INTERNAL MEDICINE

## 2023-12-20 RX ORDER — DOCUSATE SODIUM 100 MG/1
200 CAPSULE, LIQUID FILLED ORAL 2 TIMES DAILY
Status: SHIPPED
Start: 2023-12-20

## 2023-12-20 RX ADMIN — BACLOFEN 20 MG: 10 TABLET ORAL at 21:20

## 2023-12-20 RX ADMIN — Medication 10 MG: at 21:21

## 2023-12-20 RX ADMIN — LOSARTAN POTASSIUM 50 MG: 50 TABLET, FILM COATED ORAL at 18:22

## 2023-12-20 RX ADMIN — FERROUS SULFATE TAB 325 MG (65 MG ELEMENTAL FE) 325 MG: 325 (65 FE) TAB at 18:22

## 2023-12-20 RX ADMIN — HEPARIN SODIUM 5000 UNITS: 5000 INJECTION, SOLUTION INTRAVENOUS; SUBCUTANEOUS at 21:20

## 2023-12-20 RX ADMIN — MEROPENEM 500 MG: 500 INJECTION, POWDER, FOR SOLUTION INTRAVENOUS at 12:42

## 2023-12-20 RX ADMIN — Medication 400 MG: at 05:57

## 2023-12-20 RX ADMIN — BACLOFEN 20 MG: 10 TABLET ORAL at 12:42

## 2023-12-20 RX ADMIN — DOCUSATE SODIUM 200 MG: 100 CAPSULE, LIQUID FILLED ORAL at 18:21

## 2023-12-20 RX ADMIN — HEPARIN SODIUM 5000 UNITS: 5000 INJECTION, SOLUTION INTRAVENOUS; SUBCUTANEOUS at 05:57

## 2023-12-20 RX ADMIN — POLYETHYLENE GLYCOL 3350 1 PACKET: 17 POWDER, FOR SOLUTION ORAL at 05:56

## 2023-12-20 RX ADMIN — BACLOFEN 20 MG: 10 TABLET ORAL at 05:56

## 2023-12-20 RX ADMIN — FERROUS SULFATE TAB 325 MG (65 MG ELEMENTAL FE) 325 MG: 325 (65 FE) TAB at 05:56

## 2023-12-20 RX ADMIN — MEROPENEM 500 MG: 500 INJECTION, POWDER, FOR SOLUTION INTRAVENOUS at 18:21

## 2023-12-20 RX ADMIN — DAPTOMYCIN 800 MG: 500 INJECTION, POWDER, LYOPHILIZED, FOR SOLUTION INTRAVENOUS at 13:10

## 2023-12-20 RX ADMIN — SENNOSIDES 17.2 MG: 8.6 TABLET, FILM COATED ORAL at 18:21

## 2023-12-20 RX ADMIN — AMLODIPINE BESYLATE 5 MG: 5 TABLET ORAL at 05:56

## 2023-12-20 RX ADMIN — DOCUSATE SODIUM 200 MG: 100 CAPSULE, LIQUID FILLED ORAL at 05:57

## 2023-12-20 RX ADMIN — HEPARIN SODIUM 5000 UNITS: 5000 INJECTION, SOLUTION INTRAVENOUS; SUBCUTANEOUS at 14:45

## 2023-12-20 RX ADMIN — BACLOFEN 20 MG: 10 TABLET ORAL at 18:22

## 2023-12-20 RX ADMIN — SENNOSIDES 17.2 MG: 8.6 TABLET, FILM COATED ORAL at 05:57

## 2023-12-20 ASSESSMENT — ENCOUNTER SYMPTOMS
FEVER: 0
COUGH: 0
NERVOUS/ANXIOUS: 0
VOMITING: 0
CHILLS: 0
SPEECH CHANGE: 0
SENSORY CHANGE: 0
DEPRESSION: 0
PHOTOPHOBIA: 0
BLURRED VISION: 0
INSOMNIA: 0
DIZZINESS: 0
ABDOMINAL PAIN: 0
HEADACHES: 0
WEAKNESS: 0
CLAUDICATION: 0
MYALGIAS: 0
SHORTNESS OF BREATH: 0
HEARTBURN: 0
CONSTIPATION: 0
DIARRHEA: 0

## 2023-12-20 ASSESSMENT — PAIN DESCRIPTION - PAIN TYPE: TYPE: CHRONIC PAIN

## 2023-12-20 NOTE — PROGRESS NOTES
Telemetry Shift Summary     Rhythm SB  HR Range 43-52 1*AVB w/BBB  Ectopy roPAC, rCoup  Measurements  0.26/0.14/0.48     Normal Values  Rhythm SR  HR Range    Measurements 0.12-0.20 / 0.06-0.10  / 0.30-0.52

## 2023-12-20 NOTE — CARE PLAN
The patient is Stable - Low risk of patient condition declining or worsening    Shift Goals  Clinical Goals: Tolerate Q2 turns  Patient Goals: Comfort  Family Goals: n/a    Progress made toward(s) clinical / shift goals:  Patient refused Q2 turns, but tolerated wound care turns. Patient understands importance of turns risk/benefits    Patient is not progressing towards the following goals:

## 2023-12-20 NOTE — PROGRESS NOTES
Received report from Tung, over the phone. Patient transferred with [facility] RN and charge RN. Patient tolerated transport. Patient assessed, POC discussed. Patient declined Q2 turns, but was agreeable to Q4 hour turns. Call light and belongings within reach. Bed locked and in low position. Alarm on and fall precautions in place.

## 2023-12-20 NOTE — WOUND TEAM
[facility]  Inpatient Services  Wound and Skin Care Follow-up    Admission Date: 12/11/2023     Last order of IP CONSULT TO WOUND CARE was found on 12/14/2023 from Hospital Encounter on 12/11/2023     HPI, PMH, SH: Reviewed    Past Surgical History:   Procedure Laterality Date    IRRIGATION & DEBRIDEMENT GENERAL Right 12/12/2023    Procedure: IRRIGATION AND DEBRIDEMENT, WOUND/BUTTOCKS;  Surgeon: Huan Bansal M.D.;  Location: SURGERY [facility];  Service: General    IRRIGATION & DEBRIDEMENT GENERAL Left 04/26/2022    Procedure: IRRIGATION AND DEBRIDEMENT, WOUND - LEG;  Surgeon: Eric Raman M.D.;  Location: SURGERY [facility];  Service: Orthopedics    WOUND CLOSURE NEURO Left 04/26/2022    Procedure: CLOSURE, WOUND;  Surgeon: Eric Raman M.D.;  Location: SURGERY [facility];  Service: Orthopedics    ORTHOPEDIC OSTEOTOMY Left 04/26/2022    Procedure: OSTECTOMY;  Surgeon: Eric Raman M.D.;  Location: [facility];  Service: Orthopedics    INCISION AND DRAINAGE ORTHOPEDIC Left 01/27/2022    Procedure: INCISION AND DRAINAGE, WOUND, BY ORTHOPEDICS;  Surgeon: Erasmo Stewart M.D.;  Location: [facility];  Service: Orthopedics    BONE BIOPSY Left 01/27/2022    Procedure: BIOPSY, BONE;  Surgeon: Erasmo Stewart M.D.;  Location: [facility];  Service: Orthopedics    INCISION AND DRAINAGE ORTHOPEDIC  01/22/2022    Procedure: INCISION AND DRAINAGE, WOUND, BY ORTHOPEDICS;  Surgeon: Erasmo Stewart M.D.;  Location: [facility];  Service: Orthopedics    LA CYSTOSCOPY,INSERT URETERAL STENT Left 01/04/2022    Procedure: CYSTOSCOPY, WITH URETERAL STENT INSERTION;  Surgeon: Osvaldo Baltazar M.D.;  Location: [facility];  Service: Urology    LA CYSTO/URETERO/PYELOSCOPY, DX Left 01/04/2022    Procedure: URETEROSCOPY;  Surgeon: Osvaldo Baltazar M.D.;  Location: [facility];  Service: Urology    LASERTRIPSY N/A 01/04/2022    Procedure:  LITHOTRIPSY, USING LASER;  Surgeon: Osvaldo Baltazar M.D.;  Location: [facility];  Service: Urology    MN CYSTOSCOPY,INSERT URETERAL STENT Left 2021    Procedure: CYSTOSCOPY, WITH URETERAL STENT INSERTION;  Surgeon: Aly Bowen M.D.;  Location: [facility];  Service: Urology    MN CYSTO/URETERO/PYELOSCOPY, DX Left 2021    Procedure: URETEROSCOPY;  Surgeon: Aly Bowen M.D.;  Location: [facility];  Service: Urology    LASERTRIPSY Left 2021    Procedure: LITHOTRIPSY, USING LASER;  Surgeon: Aly Bowen M.D.;  Location: [facility];  Service: Urology    IRRIGATION & DEBRIDEMENT GENERAL  2020    Procedure: IRRIGATION AND DEBRIDEMENT, WOUND SACRAL ULCER;  Surgeon: Matt Cummins M.D.;  Location: [facility];  Service: Plastics    ULCER DEBRIDEMENT N/A 2019    Procedure: debridement of Sacral grade 4 ulcer - W/BONE BIOPSY, 3 liter wash out. bilateral sliding gluteal myocutaneous flap advancement;  Surgeon: Amadeo Moon M.D.;  Location: [facility];  Service: Plastics    FLAP CLOSURE  2019    Procedure: CLOSURE, FLAP - MUSCLE;  Surgeon: Amadeo Moon M.D.;  Location: [facility];  Service: Plastics    COLOSTOMY N/A 2019    Procedure: CREATION, COLOSTOMY -  placement;  Surgeon: Elías Hannah M.D.;  Location: [facility];  Service: General    COLOSTOMY      COLOSTOMY TAKEDOWN      HERNIA REPAIR      ORIF, ANKLE      PERCUTANEOUOSPINNING LOWER EXTREMITY       Social History     Tobacco Use    Smoking status: Former     Current packs/day: 0.00     Average packs/day: 1 pack/day for 10.0 years (10.0 ttl pk-yrs)     Types: Cigarettes     Start date: 1967     Quit date: 1977     Years since quittin.9    Smokeless tobacco: Never   Substance Use Topics    Alcohol use: Yes     Alcohol/week: 4.2 oz     Types: 7 Standard drinks or equivalent per week      Comment: 2/day     Chief Complaint   Patient presents with    Fever     101.8 this afternoon       Diagnosis: Osteomyelitis (HCC) [M86.9]    Unit where seen by Wound Team: 2221/01     WOUND FOLLOW UP RELATED TO:  NPWT change       WOUND TEAM PLAN OF CARE - Frequency of Follow-up:   Nursing to follow dressing orders written for wound care. Contact wound team if area fails to progress, deteriorates or with any questions/concerns if something comes up before next scheduled follow up (See below as to whether wound is following and frequency of wound follow up)  Dressing changes by wound team:                   NPWT change 3 times weekly - R IT/buttocks  Leg wounds and coccyx weekly  WOUND HISTORY:       Pt is being seen at [facility] wound clinic for Coccyx, R IT, LLE and L 2nd toe as well as HH.          WOUND ASSESSMENT/LDA      Wound 09/13/23 Pressure Injury Coccyx Superior (Active)   Date First Assessed/Time First Assessed: 09/13/23 1530   Primary Wound Type: Pressure Injury  Location: Coccyx  Wound Orientation: Superior      Assessments 12/19/2023  4:20 PM   Site Assessment Red   Periwound Assessment Pink;Scar tissue   Margins Defined edges   Closure Secondary intention   Drainage Amount None   Treatments Cleansed;Nonselective debridement   Wound Cleansing Approved Wound Cleanser   Periwound Protectant No-sting Skin Prep   Dressing Status Intact   Dressing Changed Changed   Dressing Cleansing/Solutions Not Applicable   Dressing Options Hydrofiber Silver;Offloading Dressing - Sacral   Dressing Change/Treatment Frequency Every 48 hrs, and As Needed   NEXT Dressing Change/Treatment Date 12/21/23   NEXT Weekly Photo (Inpatient Only) 12/21/23   Wound Team Following Weekly   Pressure Injury Stage Stage 4   Shape oval linear   Wound Odor None   Exposed Structures None           Wound 12/12/23 Incision Buttocks Right Debrided STAGE 4 (Active)   Date First Assessed/Time First Assessed: 12/12/23 2013   Primary Wound Type:  Incision  Location: Buttocks  Laterality: Right  Wound Description (Comments): Debrided STAGE 4      Assessments 12/19/2023  4:20 PM   Site Assessment Red   Periwound Assessment Intact   Margins Defined edges   Closure Secondary intention   Drainage Amount Scant   Drainage Description Serosanguineous   Treatments Cleansed;Nonselective debridement   Wound Cleansing Approved Wound Cleanser   Periwound Protectant No-sting Skin Prep;Paste Ring;Drape   Dressing Status Intact   Dressing Changed Changed   Dressing Cleansing/Solutions Normal Saline   Dressing Options Wound Vac   Dressing Change/Treatment Frequency Tuesday, Thursday, Saturday, and As Needed   NEXT Dressing Change/Treatment Date 12/21/23   NEXT Weekly Photo (Inpatient Only) 12/21/23   Wound Team Following 3x Weekly   Non-staged Wound Description Full thickness   Shape oval   Wound Odor None       Negative Pressure Wound Therapy 12/15/23 Pressure injury: stage 4 Buttocks;Ischium Right (Active)   Placement Date: 12/15/23   Present on Original Admission: No  Hand Hygiene Completed: Yes  Wound Type: Pressure injury: stage 4  Location: Buttocks;Ischium  Laterality: Right      Assessments 12/19/2023  4:20 PM   NPWT Pump Mode / Pressure Setting Continuous;Ulta;125 mmHg   Dressing Type Small;Black Foam (Veraflo)   Number of Foam Pieces Used 2   Canister Changed No   NEXT Dressing Change/Treatment Date 12/21/23   VAC VeraFlo Irrigant Normal Saline   VAC VeraFlo Soak Time (mins) 6   VAC VeraFlo Instill Volume (ml) 20   VAC VeraFlo - Therapy Time (hrs) 2.5   VAC VeraFlo Pressure (mm/Hg) Continuous;125 mmHg        Vascular:    JUAN MANUEL:   No results found.    Lab Values:    Lab Results   Component Value Date/Time    WBC 5.5 12/18/2023 02:14 AM    RBC 3.63 (L) 12/18/2023 02:14 AM    HEMOGLOBIN 12.3 (L) 12/18/2023 02:14 AM    HEMATOCRIT 36.6 (L) 12/18/2023 02:14 AM    CREACTPROT 19.12 (H) 12/12/2023 09:39 PM    SEDRATEWES 23 (H) 04/01/2022 02:26 PM         Culture Results  show:  Recent Results (from the past 720 hour(s))   CULTURE WOUND W/ GRAM STAIN    Collection Time: 12/12/23  5:23 AM    Specimen: Perianal; Wound   Result Value Ref Range    Significant Indicator NEG     Source WND     Site PERIANAL     Culture Result       Moderate growth usual site specdific ade including Proteus  sp. and Group D Enterococcus sp.      Gram Stain Result       Many WBCs.  Many Gram positive cocci in chains.  Few Gram negative rods.         Pain Level/Medicated:  Patient denies pain       INTERVENTIONS BY WOUND TEAM:  Chart and images reviewed. Discussed with bedside RN. All areas of concern (based on picture review, LDA review and discussion with bedside RN) have been thoroughly assessed. Documentation of areas based on significant findings. This RN in to assess patient. Performed standard wound care which includes appropriate positioning, dressing removal and non-selective debridement. Pictures and measurements obtained weekly if/when required.    Wound:  R IT/buttock  Preparation for Dressing removal: Removed without difficulty  Cleansed/Non-selectively Debrided with:  Wound cleanser and Gauze  Raeann wound: Cleansed with Wound cleanser and Gauze, Prepped with No Sting, Paste Rings, and Drape  Primary Dressing:  VF VAC black foam x 1 piece  Secondary (Outer) Dressing: Sealed with drape and VF NPWT resumed with no leaks; 2 sacral offloading drsgs applied towards R hip to offload tubing     Wound:  Coccyx  Preparation for Dressing removal: Removed without difficulty  Cleansed/Non-selectively Debrided with:  Wound cleanser and Gauze  Raeann wound: Cleansed with Wound cleanser and Gauze, Prepped with No Sting  Primary Dressing:  silver hydrofiber  Secondary (Outer) Dressing: offloading sacral drsg    Advanced Wound Care Discharge Planning  Number of Clinicians necessary to complete wound care: 1  Is patient requiring IV pain medications for dressing changes:  No   Length of time for dressing change 45  min. (This does not include chart review, pre-medication time, set up, clean up or time spent charting.)    Interdisciplinary consultation: Patient, Bedside RN (Vanessa)  EVALUATION / RATIONALE FOR TREATMENT:     Date:  12/19/23  Wound Status:  Wound improving    Pt's R IT wound with start of granulation buds. Coccyx wound red and some epithelial cells to edge  Date:  12/17/23  Wound Status:  Wound improving    Pt had I&D with VAC placement last week. Wound bed red. Continuing with VF NPWT.  Date:  12/15/23  Wound Status:  Initial evaluation to Right IT/Buttock post debridement; Coccyx and LLE wounds progressing as anticipated.     Right IT/Buttock with majority red granular tissue, scant yellow slough with visible muscle and palpable bone. Scant serosanguinous drainage, undermining cirmferential with tunnel at 9 o'clock. NPWT with veraflow and normal saline applied to assist with wound closure by secondary intention, management of bio-burden and exudate through mechanical debridement, and increase oxygenation and granulation tissue production to wound bed.      Coccyx wound with red granular tissue and scant drainage, scar tissue present to periwound. LLE shallow with majority dry scabs. Aquacel Ag Hydrofiber applied to manage bioburden, absorb exudate, and maintain a moist wound environment without laterally wicking exudate therefore reducing shira-wound maceration.    Date:  12/12/23  Wound Status:  Initial evaluation    Per Dr Bansal note pt to go to [facility] for debridement of R IT wound, so assessed wound and placed wet-dry drsg. Per pt possibility of VAC placement.  Coccyx wounds are small with scar tissue present. LLE shallow with scant drainage. Aquacel Ag Hydrofiber applied to manage bioburden, absorb exudate, and maintain a moist wound environment without laterally wicking exudate therefore reducing shira-wound maceration.    Nsg had changed colostomy pouching system yesterday and it is intact. Supplies at bedside.  Stoma red with brown soft output.            Goals: Steady decrease in wound area and depth weekly.    NURSING PLAN OF CARE ORDERS:  No new orders this visit    NUTRITION RECOMMENDATIONS   Wound Team Recommendations:  N/A     DIET ORDERS (From admission to next 24h)       Start     Ordered    12/14/23 1041  Supplements  ONCE        Question:  Which Supplement  Answer:  Per RD  Comment:  TID Boost Glucose Control; BID Arginaid    12/14/23 1041    12/13/23 0755  Diet Order Diet: Regular  ALL MEALS        Question:  Diet:  Answer:  Regular    12/13/23 0755                    PREVENTATIVE INTERVENTIONS:   Q shift Luis Enrique - performed per nursing policy  Q shift pressure point assessments - performed per nursing policy        Anticipated discharge plans:  LTACH        Vac Discharge Needs: Pt would benefit if able to remain on Veraflow  Vac Discharge plan is purely a recommendation from wound team and not a requirement for discharge unless otherwise stated by physician.  Veraflo Vac while inpatient, ok to transition to Regular Vac on discharge

## 2023-12-20 NOTE — PROGRESS NOTES
4 Eyes Skin Assessment Completed by ZAIRA Robles and ZAIRA Cedillo.    Head WDL  Ears WDL  Nose WDL  Mouth WDL  Neck WDL  Breast/Chest WDL  Shoulder Blades WDL  Spine WDL  (R) Arm/Elbow/Hand multiple Scars throughout arm  (L) Arm/Elbow/Hand WDL  Abdomen Scar, LLQ colostomy   Groin WDL  Scrotum/Coccyx/Buttocks Redness and Blanching, S4 pressure injury right buttocks,   (R) Leg Scar, discoloration  (L) Leg Scar, discoloration,   (R) Heel/Foot/Toe Redness, flaky, dry   (L) Heel/Foot/Toe Discoloration, 2nd toe ulcer, flaky, dry, redness          Devices In Places Tele Box, Blood Pressure Cuff, and NANCY's, Wound Vac, PICC, Suprapubic hutchins      Interventions In Place Heel Float Boots, Pillows, Q2 Turns, and Low Air Loss Mattress    Possible Skin Injury Yes    Pictures Uploaded Into Epic Yes  Wound Consult Placed Yes  RN Wound Prevention Protocol Ordered Yes
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
## 2023-12-20 NOTE — DISCHARGE INSTRUCTIONS
Osteomyelitis, Adult  Bone infections, also called osteomyelitis,occur when bacteria or other germs get inside a bone. This can happen if you have an infection in another part of your body that spreads through your blood. It can also happen if you have a wound or a broken bone (fracture) that breaks the skin. A wound or a fracture can allow germs from your skin or from outside of your body to spread to your bone.  Bone infections need to be treated quickly to:  Prevent bone damage.  Prevent the infection from spreading to other areas of your body.  What are the causes?  Most bone infections are caused by bacteria. The most common bacteria is one found on the skin (staphylococcus). Bone infections can also be caused by other germs, such as viruses and funguses.  What increases the risk?  You are more likely to develop this condition if:  You recently had surgery, especially bone or joint surgery.  You have had an injury, such as stepping on a nail or having a fracture that exposes bones through the skin.  You have a long-term (chronic) disease, such as:  Diabetes.  HIV (human immunodeficiency virus).  Rheumatoid arthritis.  Sickle cell anemia.  Kidney disease that requires dialysis.  You have a condition that affects your body's defense system (immune system) or you take medicines that block or weaken the immune system.  You have a condition that reduces your blood flow.  You have an artificial joint.  You have had a joint or bone repaired with plates or screws.  You use IV drugs.  What are the signs or symptoms?  Symptoms vary depending on the type and location of your infection. Common symptoms of bone infections include:  Fever and chills.  Skin redness and warmth.  Swelling.  Pain and stiffness.  Drainage of fluid or pus near the infection.  How is this diagnosed?  This condition may be diagnosed based on:  Your symptoms and medical history.  A physical exam.  Tests, such as:  A sample of tissue, fluid, or blood  taken to be examined under a microscope.  Pus or discharge swabbed from a wound for testing. This is to identify the type of germs and to determine what type of medicine will kill them.  Blood tests.  Imaging studies, including X-rays, MRI, CT scan, bone scan, or ultrasound.  How is this treated?  Treatment for this condition depends on the cause and type of infection. Antibiotic medicines are usually the first treatment for a bone infection. This may be done in a hospital at first. You may have to continue IV antibiotics at home or take antibiotics by mouth for several weeks after that.  Other treatments may include surgery to:  Remove dead or dying tissue from a bone.  Remove an infected artificial joint.  Remove infected plates or screws that were used to repair a broken bone.  Follow these instructions at home:  Medicines  Take over-the-counter and prescription medicines as told by your health care provider. Finish all antibiotic medicine even if you start to feel better.  Follow instructions from your health care provider about how to take IV antibiotics at home. You may need to have a nurse come to your home to give you the IV antibiotics.  Managing pain, stiffness, and swelling  If directed, put ice on the affected area. To do this:  Put ice in a plastic bag.  Place a towel between your skin and the bag.  Leave the ice on for 20 minutes, 2-3 times a day.    General instructions  Ask your health care provider if you have any restrictions on your activities.  Do not use any products that contain nicotine or tobacco, such as cigarettes, e-cigarettes, and chewing tobacco. If you need help quitting, ask your health care provider.  Keep all follow-up visits as told by your health care provider. This is important.  How is this prevented?  Wash your hands often to stop the spread of germs. Wash your hands for at least 20 seconds with soap and water. If soap and water are not available, use hand .  Keep any  open areas, cuts, or wounds clean. Apply a clean bandage after cleaning the area.  Check wounds frequently for signs of infections. Signs of infection include redness, swelling, warmth, pus, or a bad smell.  Wear proper footwear to avoid injuries to the feet.  Contact a health care provider if:  You develop a fever or chills.  You have redness, warmth, pain, or swelling that returns after treatment.  Get help right away if:  You have rapid breathing or you have trouble breathing.  You have chest pain.  You cannot drink fluids or make urine.  The affected area swells, changes color, or turns blue.  You have numbness or severe pain in the affected area.  These symptoms may represent a serious problem that is an emergency. Do not wait to see if the symptoms will go away. Get medical help right away. Call your local emergency services (911 in the [place] Do not drive yourself to the hospital.  Summary  Bone infections, also called osteomyelitis,occur when bacteria or other germs get inside a bone.  You are more likely to get this type of infection if you have a condition that lowers your ability to fight infections. You are also likely to get this condition if you take medicines that block or weaken the immune system.  Most bone infections are caused by bacteria. They can also be caused by other germs, such as viruses and funguses.  Treatment for this condition usually starts with taking antibiotics. Further treatment depends on the cause and type of infection.  This information is not intended to replace advice given to you by your health care provider. Make sure you discuss any questions you have with your health care provider.  Antibiotic Medicine, Adult  Antibiotic medicines treat infections caused by a type of germ called bacteria. These medicines work by killing the bacteria that make you sick. You should take antibiotic medicines safely and only when needed.  When do I need to take antibiotics?  You may need  antibiotics for:  A urinary tract infection (UTI).  Strep throat.  A sinus infection caused by bacteria.  Meningitis. This affects the spinal cord and brain.  A bad lung infection.  You may start your medicines while your doctor waits for your results on some tests. When your results come back, your doctor may change or stop your medicine based on your test results.  When are antibiotics not needed?  You do not need these medicines for most common illnesses, such as:  A cold.  The flu.  A sore throat.  Mucus being an odd color.  Bronchitis.  Sometimes, antibiotics are not needed for an infection caused by bacteria. Do not ask for these medicines, or take them, when they are not needed.  How long should I take my antibiotic?  You need to take all your medicine. Take your antibiotic medicine as told by your doctor. Do not stop taking the antibiotic even if you start to feel better. If you stop taking it too soon:  You may feel sick again.  Your infection may get harder to treat.  Antibiotics need different amounts of time to work. Some treatments last just a few days. Some last about a week to 10 days. Sometimes, you may need to take antibiotics for a few weeks to fully treat your infection.  What if I miss a dose?  Try not to miss a dose. If you miss a dose, call your doctor or pharmacist. Sometimes, it is okay to take the missed dose as soon as you can. Do not take an extra dose.  What are the risks of taking antibiotics?  Antibiotics can cause:  Allergic attacks.  A feeling like you may vomit (nausea).  Yeast infections.  Liver problems.  These medicines can cause an infection called C. diff. This causes watery poop (diarrhea). This happens when antibiotics kill good germs in your gut. This lets C. diff grow. Tell your doctor right away if:  You get watery poop while taking your antibiotic.  You get watery poop after you stop your antibiotic. C. diff can happen weeks after you stop your medicine.  You also have a  risk of getting an infection in the future that antibiotics cannot treat (antibiotic-resistant infection). These infections can get very bad. Sometimes, they can be life-threatening.  Do antibiotics affect birth control?  Birth control pills may not work. If you take birth control pills:  Keep taking them as normal.  Use a second form of birth control, such as a condom. Do this for as long as told by your doctor.  What else should I know about taking antibiotics?  You need to take these medicines exactly as told. Make sure to do these things:  Take the right amount of medicine at the same time each day.  Ask your doctor:  How long to wait between doses.  If you should take your medicine with food.  If you should stay away from some foods, drinks, or medicines.  What side effects you should watch for.  Use only the medicines that your doctor said to use. Do not use medicines that were given to someone else.  Drink a large glass of water when you take your medicine. Drink enough fluid to keep your pee (urine) pale yellow.  Ask your pharmacist for a tool to measure your medicine. This may be a syringe, cup, or spoon.  Throw out any extra medicine.  Follow these instructions at home:  Take over-the-counter and prescription medicines as told by your doctor.  Return to your normal activities as told by your doctor. Ask your doctor what activities are safe for you.  Keep all follow-up visits as told by your doctor. This is important.  Contact a doctor if:  You feel worse.  You have one of these after you start your medicine:  New joint pain.  New muscle aches.  You have side effects from your medicine, such as:  Stomach pain.  Watery poop.  Feeling like you may vomit.  White patches in your mouth or throat.  Get help right away if:  You have a very bad allergic attack. If this happens, stop taking your medicine right away. You may get:  Hives. These are raised, itchy, red bumps on your skin.  Skin rash.  Trouble  breathing.  Breathing that has whistling sounds.  Swelling on your body.  A dizzy feeling.  Vomiting.  You have symptoms of liver problems. You may have:  Dark pee, or pee that is the color of blood.  Yellow skin.  Easy bruising.  Easy bleeding.  You have very bad watery poop.  You have cramps in your belly.  You have a very bad headache.  These symptoms may be an emergency. Do not wait to see if the symptoms will go away. Get medical help right away. Call your local emergency services (911 in the [place] Do not drive yourself to the hospital.  Summary  Antibiotics are used to treat infections caused by bacteria.  Take these medicines safely and only when needed.  Your doctor may change or stop your medicine based on your test results.  Take all your medicine even when you feel better.  This information is not intended to replace advice given to you by your health care provider. Make sure you discuss any questions you have with your health care provider.  Document Revised: 09/29/2020 Document Reviewed: 10/06/2020  [employer]vier Patient Education © 2023 [employer]    Document Revised: 03/04/2021 Document Reviewed: 03/04/2021  Elsevier Patient Education © 2023 [employer]

## 2023-12-20 NOTE — PROGRESS NOTES
BSSR received from day shift RN. Patient laying in bed in no apparent distress with no complaints. A&O X4. Plan of care discussed with patient. Bed in low position with bed brakes applied and call light in reach. Patient states no needs at this time.     2330-Notified from tele patient sustaining HR of less than 45 for 7 minutes and dropping into the 30's. Dr Gonzalez ordered EKG      0100  Orders to transfer to tele unit      0200- Report called to Travis MENESES. Patient transferred to tele. All belongings with patient. Patient in stable condition in no apparent distress with no complaints.

## 2023-12-20 NOTE — DISCHARGE PLANNING
Case Management Discharge Planning    Admission Date: 12/11/2023  GMLOS: 7.8  ALOS: 8    6-Clicks ADL Score: 6  6-Clicks Mobility Score: 6  PT and/or OT Eval ordered: Yes  Post-acute Referrals Ordered: Yes  Post-acute Choice Obtained: Yes  Has referral(s) been sent to post-acute provider:  Yes      Anticipated Discharge Dispo: Discharge Disposition: Disch to a long term care facility (63)    DME Needed: No    Action(s) Taken: Phone Call to [facility]    Escalations Completed: None    Medically Clear: Yes    Course of Action   **[id number]  Call to Peyton at [facility], [phone]. Per Peyton, they can likely take patient tomorrow if they have a bed. Peyton reports she will give this writer a call tomorrow. Providers informed.     **[id number]  LSW spoke to patient at bedside. Discussed possible transfer to LT hospital tomorrow: [facility]. Patient agreeable to this idea. COBRA signed. LSW informed patient I would keep bedside RN informed on transfer tomorrow.

## 2023-12-20 NOTE — CARE PLAN
The patient is Stable - Low risk of patient condition declining or worsening    Shift Goals  Clinical Goals: Encourage Q2 turns, monitor wound vac, monitor BP/HR  Patient Goals: Rest  Family Goals: n/a    Progress made toward(s) clinical / shift goals:    Problem: Skin Integrity  Goal: Skin integrity is maintained or improved  Outcome: Progressing  Note: Patient repositioned, and skin assessed. Patient encouraged to reposition every two hours. Patient declined and patient agreeable to being repositioned with vital signs     Problem: Fall Risk  Goal: Patient will remain free from falls  Outcome: Progressing  Note: Patient utilized call light button to request needs.      Problem: Pain - Standard  Goal: Alleviation of pain or a reduction in pain to the patient’s comfort goal  Outcome: Progressing  Note: [facility] transported from [facility] with [facility] staff. Patient assessed and reported no pain.        Patient is not progressing towards the following goals:

## 2023-12-21 ENCOUNTER — NON-PROVIDER VISIT (OUTPATIENT)
Dept: CARDIOLOGY | Facility: MEDICAL CENTER | Age: 73
End: 2023-12-21
Payer: MEDICARE

## 2023-12-21 PROCEDURE — 700102 HCHG RX REV CODE 250 W/ 637 OVERRIDE(OP): Performed by: INTERNAL MEDICINE

## 2023-12-21 PROCEDURE — A9270 NON-COVERED ITEM OR SERVICE: HCPCS | Performed by: INTERNAL MEDICINE

## 2023-12-21 PROCEDURE — 97602 WOUND(S) CARE NON-SELECTIVE: CPT

## 2023-12-21 PROCEDURE — 302098 PASTE RING (FLAT): Performed by: INTERNAL MEDICINE

## 2023-12-21 PROCEDURE — 770020 HCHG ROOM/CARE - TELE (206)

## 2023-12-21 PROCEDURE — 700105 HCHG RX REV CODE 258: Performed by: INTERNAL MEDICINE

## 2023-12-21 PROCEDURE — 700111 HCHG RX REV CODE 636 W/ 250 OVERRIDE (IP): Mod: JZ | Performed by: INTERNAL MEDICINE

## 2023-12-21 PROCEDURE — 700111 HCHG RX REV CODE 636 W/ 250 OVERRIDE (IP): Performed by: INTERNAL MEDICINE

## 2023-12-21 PROCEDURE — 97605 NEG PRS WND THER DME<=50SQCM: CPT

## 2023-12-21 PROCEDURE — 93298 REM INTERROG DEV EVAL SCRMS: CPT | Performed by: INTERNAL MEDICINE

## 2023-12-21 PROCEDURE — 94760 N-INVAS EAR/PLS OXIMETRY 1: CPT

## 2023-12-21 PROCEDURE — 99233 SBSQ HOSP IP/OBS HIGH 50: CPT | Performed by: INTERNAL MEDICINE

## 2023-12-21 RX ADMIN — MEROPENEM 500 MG: 500 INJECTION, POWDER, FOR SOLUTION INTRAVENOUS at 17:24

## 2023-12-21 RX ADMIN — FERROUS SULFATE TAB 325 MG (65 MG ELEMENTAL FE) 325 MG: 325 (65 FE) TAB at 17:23

## 2023-12-21 RX ADMIN — Medication 10 MG: at 21:08

## 2023-12-21 RX ADMIN — BACLOFEN 20 MG: 10 TABLET ORAL at 21:08

## 2023-12-21 RX ADMIN — BACLOFEN 20 MG: 10 TABLET ORAL at 11:53

## 2023-12-21 RX ADMIN — HEPARIN SODIUM 5000 UNITS: 5000 INJECTION, SOLUTION INTRAVENOUS; SUBCUTANEOUS at 14:10

## 2023-12-21 RX ADMIN — MEROPENEM 500 MG: 500 INJECTION, POWDER, FOR SOLUTION INTRAVENOUS at 06:00

## 2023-12-21 RX ADMIN — HEPARIN SODIUM 5000 UNITS: 5000 INJECTION, SOLUTION INTRAVENOUS; SUBCUTANEOUS at 06:33

## 2023-12-21 RX ADMIN — HEPARIN SODIUM 5000 UNITS: 5000 INJECTION, SOLUTION INTRAVENOUS; SUBCUTANEOUS at 21:08

## 2023-12-21 RX ADMIN — LOSARTAN POTASSIUM 50 MG: 50 TABLET, FILM COATED ORAL at 17:23

## 2023-12-21 RX ADMIN — DAPTOMYCIN 800 MG: 500 INJECTION, POWDER, LYOPHILIZED, FOR SOLUTION INTRAVENOUS at 14:08

## 2023-12-21 RX ADMIN — MEROPENEM 500 MG: 500 INJECTION, POWDER, FOR SOLUTION INTRAVENOUS at 01:58

## 2023-12-21 RX ADMIN — DOCUSATE SODIUM 200 MG: 100 CAPSULE, LIQUID FILLED ORAL at 06:34

## 2023-12-21 RX ADMIN — BACLOFEN 20 MG: 10 TABLET ORAL at 17:23

## 2023-12-21 RX ADMIN — Medication 400 MG: at 06:35

## 2023-12-21 RX ADMIN — BACLOFEN 20 MG: 10 TABLET ORAL at 06:34

## 2023-12-21 RX ADMIN — AMLODIPINE BESYLATE 5 MG: 5 TABLET ORAL at 06:34

## 2023-12-21 RX ADMIN — SENNOSIDES 17.2 MG: 8.6 TABLET, FILM COATED ORAL at 06:34

## 2023-12-21 RX ADMIN — POLYETHYLENE GLYCOL 3350 1 PACKET: 17 POWDER, FOR SOLUTION ORAL at 06:33

## 2023-12-21 RX ADMIN — MEROPENEM 500 MG: 500 INJECTION, POWDER, FOR SOLUTION INTRAVENOUS at 23:32

## 2023-12-21 RX ADMIN — MEROPENEM 500 MG: 500 INJECTION, POWDER, FOR SOLUTION INTRAVENOUS at 11:54

## 2023-12-21 RX ADMIN — FERROUS SULFATE TAB 325 MG (65 MG ELEMENTAL FE) 325 MG: 325 (65 FE) TAB at 06:35

## 2023-12-21 ASSESSMENT — ENCOUNTER SYMPTOMS
DIZZINESS: 0
HEARTBURN: 0
BLURRED VISION: 0
VOMITING: 0
NERVOUS/ANXIOUS: 0
MYALGIAS: 0
SHORTNESS OF BREATH: 0
ABDOMINAL PAIN: 0
CLAUDICATION: 0
FEVER: 0
PHOTOPHOBIA: 0
SENSORY CHANGE: 0
INSOMNIA: 0
COUGH: 0
HEADACHES: 0
DIARRHEA: 0
DEPRESSION: 0
SPEECH CHANGE: 0
CHILLS: 0
WEAKNESS: 0
CONSTIPATION: 0

## 2023-12-21 ASSESSMENT — PAIN DESCRIPTION - PAIN TYPE: TYPE: CHRONIC PAIN

## 2023-12-21 NOTE — CARE PLAN
The patient is Stable - Low risk of patient condition declining or worsening    Shift Goals  Clinical Goals: Maintian skin integrity  Patient Goals: Sleep  Family Goals: n/a    Progress made toward(s) clinical / shift goals:    Problem: Knowledge Deficit - Standard  Goal: Patient and family/care givers will demonstrate understanding of plan of care, disease process/condition, diagnostic tests and medications  Outcome: Progressing     Problem: Skin Integrity  Goal: Skin integrity is maintained or improved  Outcome: Progressing     Problem: Fall Risk  Goal: Patient will remain free from falls  Outcome: Progressing       Patient is not progressing towards the following goals:
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
## 2023-12-21 NOTE — PROGRESS NOTES
Telemetry Shift Summary     Rhythm SB/SR first degree HB, BBB  HR Range 43-70   Ectopy rPAC; rPVC  Measurements 0.24/0.14/0.44      20 bts accelerated idio      Normal Values  Rhythm SR  HR Range    Measurements 0.12-0.20 / 0.06-0.10  / 0.30-0.52

## 2023-12-21 NOTE — CARDIAC REMOTE MONITOR - SCAN
Device transmission reviewed. Device demonstrated appropriate function.       Electronically Signed by: Briana Bell M.D.    12/29/2023  6:58 AM
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
## 2023-12-21 NOTE — PROGRESS NOTES
Telemetry Shift Summary     Rhythm SB/SR first degree HB, BBB  HR : 55-90  Ectopy rPAC; rPVC  Measurements 0.22/0.16/0.46      20 bts accelerated idio      Normal Values  Rhythm SR  HR Range    Measurements 0.12-0.20 / 0.06-0.10  / 0.30-0.52

## 2023-12-21 NOTE — DISCHARGE PLANNING
Case Management Discharge Planning    Admission Date: 12/11/2023  GMLOS: 7.8  ALOS: 9    6-Clicks ADL Score: 6  6-Clicks Mobility Score: 6    Anticipated Discharge Dispo: Discharge Disposition: Disch to a long term care facility (63)    DME Needed: No    Action(s) Taken: Voicemail Left for [facility]    Escalations Completed: None    Medically Clear: Yes    Barriers to Discharge: Bed Availability     Course of Action  **0835  [facility] to [place] [facility], [phone]. Patient is medically cleared for LTAC. No answer, LSW left voicemail requesting call back.     **0840  Call from [place] with [facility]. [facility].    **1042  Call to [place]. Per [place], there is not a time yet for me to set up transport. She is waiting for two patients to discharge to SNF and she is unsure of when transport for them will be set up. Once time for her two patient's is determined, she will inform this writer when transport can be set up for this patient.     **1226  Per [place], still no time on when patient can discharge.    **1337  Call to [place]. No answer.    **1410  Call to [place]. No answer. Left voicemail.     **1421  Call to [facility], [phone]. Per [facility], patient is not listed under their admissions for the day. LSW to keep trying to contact [place].     **1508  LSW informed [facility] expected discharge did not discharge today. [facility] does not have a bed for patient today. RN and MD informed.

## 2023-12-21 NOTE — WOUND TEAM
[facility]  Inpatient Services  Wound and Skin Care Follow-up    Admission Date: 12/11/2023     Last order of IP CONSULT TO WOUND CARE was found on 12/14/2023 from Hospital Encounter on 12/11/2023     HPI, PMH, SH: Reviewed    Past Surgical History:   Procedure Laterality Date    IRRIGATION & DEBRIDEMENT GENERAL Right 12/12/2023    Procedure: IRRIGATION AND DEBRIDEMENT, WOUND/BUTTOCKS;  Surgeon: Huan Bansal M.D.;  Location: SURGERY [facility];  Service: General    IRRIGATION & DEBRIDEMENT GENERAL Left 04/26/2022    Procedure: IRRIGATION AND DEBRIDEMENT, WOUND - LEG;  Surgeon: Eric Raman M.D.;  Location: SURGERY [facility];  Service: Orthopedics    WOUND CLOSURE NEURO Left 04/26/2022    Procedure: CLOSURE, WOUND;  Surgeon: Eric Raman M.D.;  Location: SURGERY [facility];  Service: Orthopedics    ORTHOPEDIC OSTEOTOMY Left 04/26/2022    Procedure: OSTECTOMY;  Surgeon: Eric Raman M.D.;  Location: [facility];  Service: Orthopedics    INCISION AND DRAINAGE ORTHOPEDIC Left 01/27/2022    Procedure: INCISION AND DRAINAGE, WOUND, BY ORTHOPEDICS;  Surgeon: Erasmo Stewart M.D.;  Location: [facility];  Service: Orthopedics    BONE BIOPSY Left 01/27/2022    Procedure: BIOPSY, BONE;  Surgeon: Erasmo Stewart M.D.;  Location: [facility];  Service: Orthopedics    INCISION AND DRAINAGE ORTHOPEDIC  01/22/2022    Procedure: INCISION AND DRAINAGE, WOUND, BY ORTHOPEDICS;  Surgeon: Erasmo Stewart M.D.;  Location: [facility];  Service: Orthopedics    NM CYSTOSCOPY,INSERT URETERAL STENT Left 01/04/2022    Procedure: CYSTOSCOPY, WITH URETERAL STENT INSERTION;  Surgeon: Osvaldo Baltazar M.D.;  Location: [facility];  Service: Urology    NM CYSTO/URETERO/PYELOSCOPY, DX Left 01/04/2022    Procedure: URETEROSCOPY;  Surgeon: Osvaldo Baltazar M.D.;  Location: [facility];  Service: Urology    LASERTRIPSY N/A 01/04/2022    Procedure:  LITHOTRIPSY, USING LASER;  Surgeon: Osvaldo Baltazar M.D.;  Location: [facility];  Service: Urology    NH CYSTOSCOPY,INSERT URETERAL STENT Left 2021    Procedure: CYSTOSCOPY, WITH URETERAL STENT INSERTION;  Surgeon: Aly Bowen M.D.;  Location: [facility];  Service: Urology    NH CYSTO/URETERO/PYELOSCOPY, DX Left 2021    Procedure: URETEROSCOPY;  Surgeon: Aly Bowen M.D.;  Location: [facility];  Service: Urology    LASERTRIPSY Left 2021    Procedure: LITHOTRIPSY, USING LASER;  Surgeon: Aly Bowen M.D.;  Location: [facility];  Service: Urology    IRRIGATION & DEBRIDEMENT GENERAL  2020    Procedure: IRRIGATION AND DEBRIDEMENT, WOUND SACRAL ULCER;  Surgeon: Matt Cummins M.D.;  Location: [facility];  Service: Plastics    ULCER DEBRIDEMENT N/A 2019    Procedure: debridement of Sacral grade 4 ulcer - W/BONE BIOPSY, 3 liter wash out. bilateral sliding gluteal myocutaneous flap advancement;  Surgeon: Amadeo Moon M.D.;  Location: [facility];  Service: Plastics    FLAP CLOSURE  2019    Procedure: CLOSURE, FLAP - MUSCLE;  Surgeon: Amadeo Moon M.D.;  Location: [facility];  Service: Plastics    COLOSTOMY N/A 2019    Procedure: CREATION, COLOSTOMY -  placement;  Surgeon: Elías Hannah M.D.;  Location: [facility];  Service: General    COLOSTOMY      COLOSTOMY TAKEDOWN      HERNIA REPAIR      ORIF, ANKLE      PERCUTANEOUOSPINNING LOWER EXTREMITY       Social History     Tobacco Use    Smoking status: Former     Current packs/day: 0.00     Average packs/day: 1 pack/day for 10.0 years (10.0 ttl pk-yrs)     Types: Cigarettes     Start date: 1967     Quit date: 1977     Years since quittin.0    Smokeless tobacco: Never   Substance Use Topics    Alcohol use: Yes     Alcohol/week: 4.2 oz     Types: 7 Standard drinks or equivalent per week      Comment: 2/day     Chief Complaint   Patient presents with    Fever     101.8 this afternoon       Diagnosis: Osteomyelitis (HCC) [M86.9]    Unit where seen by Wound Team: 1112/01     WOUND FOLLOW UP RELATED TO:  R IT NPWT, coccyx, L posterior leg       WOUND TEAM PLAN OF CARE - Frequency of Follow-up:   Nursing to follow dressing orders written for wound care. Contact wound team if area fails to progress, deteriorates or with any questions/concerns if something comes up before next scheduled follow up (See below as to whether wound is following and frequency of wound follow up)  Dressing changes by wound team:                   NPWT change 3 times weekly - R IT  Weekly - Coccyx  Not following, consult as needed  - L posterior leg    WOUND HISTORY:       Pt is being seen at [facility] wound clinic for Coccyx, R IT, LLE and L 2nd toe as well as HH.   12/12/23 I&D R IT with Dr. Bansal  Wound team was initially consulted for Vac placement to R IT.       WOUND ASSESSMENT/LDA  Wound 06/18/23 Pressure Injury Leg Posterior Left resolved 9/1/23, re-opened 11/17/23 (Active)   Date First Assessed/Time First Assessed: 06/18/23 1400   Present on Original Admission: Yes  Hand Hygiene Completed: Yes  Primary Wound Type: Pressure Injury  Location: Leg  Wound Orientation: Posterior  Laterality: Left  Wound Description (Comments):...      Assessments 12/21/2023  3:00 PM   Site Assessment Pink;Red   Periwound Assessment Fragile;Purple   Margins Defined edges;Attached edges   Closure Secondary intention   Drainage Amount None   Treatments Cleansed;Offloading   Offloading/DME Heel float boot   Wound Cleansing Normal Saline Irrigation   Periwound Protectant Skin Protectant Wipes to Periwound   Dressing Status Clean;Dry;Intact   Dressing Changed Changed   Dressing Cleansing/Solutions Not Applicable   Dressing Options Hydrofiber Silver;Silicone Adhesive Foam;Tubigrip   Dressing Change/Treatment Frequency Every 48 hrs, and As Needed   NEXT  Dressing Change/Treatment Date 12/23/23   Wound Team Following Weekly   Non-staged Wound Description Partial thickness   Shape Pinpoint   Wound Odor None   Exposed Structures None       Wound 09/13/23 Pressure Injury Coccyx Superior (Active)   Date First Assessed/Time First Assessed: 09/13/23 1530   Primary Wound Type: Pressure Injury  Location: Coccyx  Wound Orientation: Superior      Assessments 12/21/2023  3:00 PM   Wound Image      Site Assessment Red   Periwound Assessment Clean;Dry;Intact;Pink   Margins Defined edges;Attached edges   Closure Secondary intention   Drainage Amount Scant   Drainage Description Serosanguineous   Treatments Cleansed;Offloading   Wound Cleansing Normal Saline Irrigation   Periwound Protectant Skin Protectant Wipes to Periwound   Dressing Status Clean;Dry;Intact   Dressing Changed Changed   Dressing Cleansing/Solutions Not Applicable   Dressing Options Hydrofiber Silver;Offloading Dressing - Sacral   Dressing Change/Treatment Frequency Every 48 hrs, and As Needed   NEXT Dressing Change/Treatment Date 12/23/23   NEXT Weekly Photo (Inpatient Only) 12/28/23   Wound Team Following Weekly   Pressure Injury Stage Stage 4   Wound Length (cm) 3 cm   Wound Width (cm) 1.5 cm   Wound Depth (cm) 0.4 cm   Wound Surface Area (cm^2) 4.5 cm^2   Wound Volume (cm^3) 1.8 cm^3   Wound Healing % -233   Shape Linear   Wound Odor None   Exposed Structures None       Wound 12/12/23 Pressure Injury Ischium Right Debrided STAGE 4 (Active)   Date First Assessed/Time First Assessed: 12/12/23 2013   Primary Wound Type: Pressure Injury  Location: Ischium  Laterality: Right  Wound Description (Comments): Debrided STAGE 4      Assessments 12/21/2023  3:00 PM   Wound Image      Site Assessment Red;Yellow;Undermining   Periwound Assessment Pink   Margins Defined edges;Unattached edges   Closure Secondary intention   Drainage Amount Scant   Drainage Description Serosanguineous   Treatments Cleansed   Wound Cleansing  Approved Wound Cleanser   Periwound Protectant Skin Protectant Wipes to Periwound;Paste Ring;Drape   Dressing Status Clean;Dry;Intact   Dressing Changed Changed   Dressing Cleansing/Solutions Normal Saline   Dressing Options Wound Vac   Dressing Change/Treatment Frequency Tuesday, Thursday, Saturday, and As Needed   NEXT Dressing Change/Treatment Date 12/23/23   NEXT Weekly Photo (Inpatient Only) 12/28/23   Wound Team Following 3x Weekly   Pressure Injury Stage Stage 4   Wound Length (cm) 5 cm   Wound Width (cm) 2.2 cm   Wound Depth (cm) 1.6 cm   Wound Surface Area (cm^2) 11 cm^2   Wound Volume (cm^3) 17.6 cm^3   Wound Healing % 69   Undermining (cm) 3.7 cm   Undermining of Wound, 1st Location From 7 o'clock;To 5 o'clock   Shape Oval   Wound Odor None   Pulses N/A   WOUND NURSE ONLY - Time Spent with Patient (mins) 75       Negative Pressure Wound Therapy 12/15/23 Pressure injury: stage 4 Buttocks;Ischium Right (Active)   Placement Date: 12/15/23   Present on Original Admission: No  Hand Hygiene Completed: Yes  Wound Type: Pressure injury: stage 4  Location: Buttocks;Ischium  Laterality: Right      Assessments 12/21/2023  3:00 PM   NPWT Pump Mode / Pressure Setting Ulta;Continuous;125 mmHg   Dressing Type Small;Black Foam (Veraflo)   Number of Foam Pieces Used 1   Canister Changed No   NEXT Dressing Change/Treatment Date 12/23/23   VAC VeraFlo Irrigant Normal Saline   VAC VeraFlo Soak Time (mins) 6   VAC VeraFlo Instill Volume (ml) 20   VAC VeraFlo - Therapy Time (hrs) 2.5   VAC VeraFlo Pressure (mm/Hg) Continuous;125 mmHg        Vascular:    JUAN MANUEL:   No results found.    Lab Values:    Lab Results   Component Value Date/Time    WBC 5.2 12/20/2023 03:45 AM    RBC 3.57 (L) 12/20/2023 03:45 AM    HEMOGLOBIN 11.9 (L) 12/20/2023 03:45 AM    HEMATOCRIT 36.5 (L) 12/20/2023 03:45 AM    CREACTPROT 19.12 (H) 12/12/2023 09:39 PM    SEDRATEWES 23 (H) 04/01/2022 02:26 PM         Culture Results show:  Recent Results (from the  past 720 hour(s))   CULTURE WOUND W/ GRAM STAIN    Collection Time: 12/12/23  5:23 AM    Specimen: Perianal; Wound   Result Value Ref Range    Significant Indicator NEG     Source WND     Site PERIANAL     Culture Result       Moderate growth usual site specdific ade including Proteus  sp. and Group D Enterococcus sp.      Gram Stain Result       Many WBCs.  Many Gram positive cocci in chains.  Few Gram negative rods.         Pain Level/Medicated:  Patient denies pain       INTERVENTIONS BY WOUND TEAM:  Chart and images reviewed. Discussed with bedside RN. All areas of concern (based on picture review, LDA review and discussion with bedside RN) have been thoroughly assessed. Documentation of areas based on significant findings. This RN in to assess patient. Performed standard wound care which includes appropriate positioning, dressing removal and non-selective debridement. Pictures and measurements obtained weekly if/when required.    Wound:  R IT  Preparation for Dressing removal: Removed without difficulty  Cleansed/Non-selectively Debrided with:  Normal Saline and Gauze  Raeann wound: Cleansed with Normal Saline and Gauze, Prepped with No Sting, Paste Rings, and Drape  Primary Dressing:  One piece of small Veraflo foam applied into wound bed and secured with Trac pad. Drape then applied to border edges.  Secondary (Outer) Dressing: Suction resumed at 125mmHg, no leaks detected. Offloading adhesive foam x2 then applied underneath Vac tubing.     Wound:  Coccyx  Preparation for Dressing removal: Removed without difficulty  Cleansed/Non-selectively Debrided with:  Normal Saline and Gauze  Raeann wound: Cleansed with Normal Saline and Gauze, Prepped with No Sting  Primary Dressing:  Aquacel Ag  Secondary (Outer) Dressing: Offloading adhesive foam     Wound:  L posterior-medial leg  Preparation for Dressing removal: Removed without difficulty  Cleansed/Non-selectively Debrided with:  Normal Saline and Gauze  Raeann wound:  Cleansed with Normal Saline and Gauze, Prepped with No Sting  Primary Dressing:  Aquacel Ag  Secondary (Outer) Dressing: Silicone adhesive foam, re-applied Tubigrip size D    Advanced Wound Care Discharge Planning  Number of Clinicians necessary to complete wound care: 1  Is patient requiring IV pain medications for dressing changes:  No   Length of time for dressing change 45 min. (This does not include chart review, pre-medication time, set up, clean up or time spent charting.)    Interdisciplinary consultation: Patient, Bedside RN, Rah ZAMORA (Wound RN).    EVALUATION / RATIONALE FOR TREATMENT:     Date:  12/21/23  Wound Status:  Wound progressing as expected    Undermining still present in R IT wound, but otherwise with good granulation tissue. Coccyx wound francoise in size as well.  Anterior LLE wound resolved, L 2nd toe wound with intact scab.  Posterior-medial LLE wound nearly resolved, continuing Aquacel at this time.  Plan is for patient to discharge to [facility] soon.    Date:  12/19/23  Wound Status:  Wound improving    Pt's R IT wound with start of granulation buds. Coccyx wound red and some epithelial cells to edge  Date:  12/17/23  Wound Status:  Wound improving    Pt had I&D with VAC placement last week. Wound bed red. Continuing with VF NPWT.  Date:  12/15/23  Wound Status:  Initial evaluation to Right IT/Buttock post debridement; Coccyx and LLE wounds progressing as anticipated.     Right IT/Buttock with majority red granular tissue, scant yellow slough with visible muscle and palpable bone. Scant serosanguinous drainage, undermining cirmferential with tunnel at 9 o'clock. NPWT with veraflow and normal saline applied to assist with wound closure by secondary intention, management of bio-burden and exudate through mechanical debridement, and increase oxygenation and granulation tissue production to wound bed.      Coccyx wound with red granular tissue and scant drainage, scar tissue present to periwound.  LLE shallow with majority dry scabs. Aquacel Ag Hydrofiber applied to manage bioburden, absorb exudate, and maintain a moist wound environment without laterally wicking exudate therefore reducing shira-wound maceration.    Date:  12/12/23  Wound Status:  Initial evaluation    Per Dr Bansal note pt to go to [facility] for debridement of R IT wound, so assessed wound and placed wet-dry drsg. Per pt possibility of VAC placement.  Coccyx wounds are small with scar tissue present. LLE shallow with scant drainage. Aquacel Ag Hydrofiber applied to manage bioburden, absorb exudate, and maintain a moist wound environment without laterally wicking exudate therefore reducing shira-wound maceration.    Nsg had changed colostomy pouching system yesterday and it is intact. Supplies at bedside. Stoma red with brown soft output.            Goals: Steady decrease in wound area and depth weekly.    NURSING PLAN OF CARE ORDERS:  No new orders this visit    NUTRITION RECOMMENDATIONS   Wound Team Recommendations:  N/A     DIET ORDERS (From admission to next 24h)       Start     Ordered    12/14/23 1041  Supplements  ONCE        Question:  Which Supplement  Answer:  Per RD  Comment:  TID Boost Glucose Control; BID Arginaid    12/14/23 1041    12/13/23 0755  Diet Order Diet: Regular  ALL MEALS        Question:  Diet:  Answer:  Regular    12/13/23 0755                    PREVENTATIVE INTERVENTIONS:   Q shift Luis Enrique - performed per nursing policy  Q shift pressure point assessments - performed per nursing policy    Surface/Positioning  Low Airloss - Currently in Place  Reposition q 2 hours - Currently in Place  TAPs Turning system - Currently in Place    Offloading/Redistribution  Sacral offloading dressing (Silicone dressing) - Currently in Place  Heel offloading dressing (Silicone dressing) - Currently in Place  Heel float boots (Prevalon boot) - Currently in Place      Containment/Moisture Prevention    Fecal ostomy - Currently in Place  aSge  Catheter - Currently in Place    Anticipated discharge plans:  LTACH        Vac Discharge Needs:  Vac Discharge plan is purely a recommendation from wound team and not a requirement for discharge unless otherwise stated by physician.  Veraflo Vac while inpatient, ok to transition to Regular Vac on discharge

## 2023-12-21 NOTE — CARE PLAN
The patient is Stable - Low risk of patient condition declining or worsening    Shift Goals  Clinical Goals: Q2 turns; maintain wound vac; monitor vitals and labs  Patient Goals: rest  Family Goals: n/a    Progress made toward(s) clinical / shift goals:    Problem: Knowledge Deficit - Standard  Goal: Patient and family/care givers will demonstrate understanding of plan of care, disease process/condition, diagnostic tests and medications  Outcome: Progressing  Note: Patient updated on plan of care. Continue IV abx; transition to LTAC tomorrow; montitor labs/vitals; encourage q2 turns.      Problem: Skin Integrity  Goal: Skin integrity is maintained or improved  Outcome: Progressing  Note: Low airloss bed in use; continue to encourage Q2 turns with wedges; wound consult in place, dressing changes as order.      Problem: Pain - Standard  Goal: Alleviation of pain or a reduction in pain to the patient’s comfort goal  Outcome: Progressing  Flowsheets (Taken 12/20/2023 4140)  Pain Rating Scale (NPRS): 0  Note: No pain reported at this time. Education on available PRN medications for pain provided, patient educated on notifying nurse of breakthrough pain. Pt verbalized understanding. Patient to notify nurse of breakthrough pain, keep pain at tolerable level.        Patient is not progressing towards the following goals:

## 2023-12-22 PROCEDURE — A9270 NON-COVERED ITEM OR SERVICE: HCPCS | Performed by: INTERNAL MEDICINE

## 2023-12-22 PROCEDURE — 700111 HCHG RX REV CODE 636 W/ 250 OVERRIDE (IP): Mod: JZ | Performed by: INTERNAL MEDICINE

## 2023-12-22 PROCEDURE — 700102 HCHG RX REV CODE 250 W/ 637 OVERRIDE(OP): Performed by: INTERNAL MEDICINE

## 2023-12-22 PROCEDURE — 99233 SBSQ HOSP IP/OBS HIGH 50: CPT | Performed by: INTERNAL MEDICINE

## 2023-12-22 PROCEDURE — 700105 HCHG RX REV CODE 258: Performed by: INTERNAL MEDICINE

## 2023-12-22 PROCEDURE — 700111 HCHG RX REV CODE 636 W/ 250 OVERRIDE (IP): Performed by: INTERNAL MEDICINE

## 2023-12-22 PROCEDURE — 770020 HCHG ROOM/CARE - TELE (206)

## 2023-12-22 PROCEDURE — 94760 N-INVAS EAR/PLS OXIMETRY 1: CPT

## 2023-12-22 RX ADMIN — HEPARIN SODIUM 5000 UNITS: 5000 INJECTION, SOLUTION INTRAVENOUS; SUBCUTANEOUS at 06:15

## 2023-12-22 RX ADMIN — Medication 10 MG: at 21:59

## 2023-12-22 RX ADMIN — AMLODIPINE BESYLATE 5 MG: 5 TABLET ORAL at 06:16

## 2023-12-22 RX ADMIN — HEPARIN SODIUM 5000 UNITS: 5000 INJECTION, SOLUTION INTRAVENOUS; SUBCUTANEOUS at 14:26

## 2023-12-22 RX ADMIN — MEROPENEM 500 MG: 500 INJECTION, POWDER, FOR SOLUTION INTRAVENOUS at 06:17

## 2023-12-22 RX ADMIN — POLYETHYLENE GLYCOL 3350 1 PACKET: 17 POWDER, FOR SOLUTION ORAL at 06:15

## 2023-12-22 RX ADMIN — MEROPENEM 500 MG: 500 INJECTION, POWDER, FOR SOLUTION INTRAVENOUS at 11:45

## 2023-12-22 RX ADMIN — ACETAMINOPHEN 650 MG: 325 TABLET ORAL at 06:15

## 2023-12-22 RX ADMIN — Medication 400 MG: at 06:16

## 2023-12-22 RX ADMIN — SENNOSIDES 17.2 MG: 8.6 TABLET, FILM COATED ORAL at 17:40

## 2023-12-22 RX ADMIN — DOCUSATE SODIUM 200 MG: 100 CAPSULE, LIQUID FILLED ORAL at 06:15

## 2023-12-22 RX ADMIN — HEPARIN SODIUM 5000 UNITS: 5000 INJECTION, SOLUTION INTRAVENOUS; SUBCUTANEOUS at 21:59

## 2023-12-22 RX ADMIN — FERROUS SULFATE TAB 325 MG (65 MG ELEMENTAL FE) 325 MG: 325 (65 FE) TAB at 06:16

## 2023-12-22 RX ADMIN — DOCUSATE SODIUM 200 MG: 100 CAPSULE, LIQUID FILLED ORAL at 17:40

## 2023-12-22 RX ADMIN — BACLOFEN 20 MG: 10 TABLET ORAL at 11:45

## 2023-12-22 RX ADMIN — FERROUS SULFATE TAB 325 MG (65 MG ELEMENTAL FE) 325 MG: 325 (65 FE) TAB at 17:40

## 2023-12-22 RX ADMIN — BACLOFEN 20 MG: 10 TABLET ORAL at 07:31

## 2023-12-22 RX ADMIN — SENNOSIDES 17.2 MG: 8.6 TABLET, FILM COATED ORAL at 06:16

## 2023-12-22 RX ADMIN — LOSARTAN POTASSIUM 50 MG: 50 TABLET, FILM COATED ORAL at 17:40

## 2023-12-22 RX ADMIN — BACLOFEN 20 MG: 10 TABLET ORAL at 17:40

## 2023-12-22 RX ADMIN — BACLOFEN 20 MG: 10 TABLET ORAL at 21:59

## 2023-12-22 RX ADMIN — MEROPENEM 500 MG: 500 INJECTION, POWDER, FOR SOLUTION INTRAVENOUS at 17:41

## 2023-12-22 RX ADMIN — DAPTOMYCIN 800 MG: 500 INJECTION, POWDER, LYOPHILIZED, FOR SOLUTION INTRAVENOUS at 11:45

## 2023-12-22 ASSESSMENT — PAIN DESCRIPTION - PAIN TYPE
TYPE: CHRONIC PAIN

## 2023-12-22 ASSESSMENT — COGNITIVE AND FUNCTIONAL STATUS - GENERAL
MOVING TO AND FROM BED TO CHAIR: UNABLE
CLIMB 3 TO 5 STEPS WITH RAILING: TOTAL
HELP NEEDED FOR BATHING: TOTAL
MOBILITY SCORE: 8
SUGGESTED CMS G CODE MODIFIER MOBILITY: CM
TOILETING: TOTAL
STANDING UP FROM CHAIR USING ARMS: A LOT
DRESSING REGULAR LOWER BODY CLOTHING: A LOT
DAILY ACTIVITIY SCORE: 14
TURNING FROM BACK TO SIDE WHILE IN FLAT BAD: A LOT
WALKING IN HOSPITAL ROOM: TOTAL
DRESSING REGULAR UPPER BODY CLOTHING: A LOT
MOVING FROM LYING ON BACK TO SITTING ON SIDE OF FLAT BED: UNABLE
SUGGESTED CMS G CODE MODIFIER DAILY ACTIVITY: CK

## 2023-12-22 ASSESSMENT — ENCOUNTER SYMPTOMS
FEVER: 0
CHILLS: 0
PHOTOPHOBIA: 0
WEAKNESS: 0
DIZZINESS: 0
HEARTBURN: 0
MYALGIAS: 0
DIARRHEA: 0
HEADACHES: 0
CLAUDICATION: 0
VOMITING: 0
INSOMNIA: 0
SENSORY CHANGE: 0
CONSTIPATION: 0
DEPRESSION: 0
COUGH: 0
NERVOUS/ANXIOUS: 0
ABDOMINAL PAIN: 0
BLURRED VISION: 0
SHORTNESS OF BREATH: 0
SPEECH CHANGE: 0

## 2023-12-22 ASSESSMENT — PATIENT HEALTH QUESTIONNAIRE - PHQ9
1. LITTLE INTEREST OR PLEASURE IN DOING THINGS: NOT AT ALL
SUM OF ALL RESPONSES TO PHQ9 QUESTIONS 1 AND 2: 0
2. FEELING DOWN, DEPRESSED, IRRITABLE, OR HOPELESS: NOT AT ALL
SUM OF ALL RESPONSES TO PHQ9 QUESTIONS 1 AND 2: 0
2. FEELING DOWN, DEPRESSED, IRRITABLE, OR HOPELESS: NOT AT ALL
1. LITTLE INTEREST OR PLEASURE IN DOING THINGS: NOT AT ALL

## 2023-12-22 NOTE — CARE PLAN
The patient is Stable - Low risk of patient condition declining or worsening    Shift Goals  Clinical Goals: Q2 turns DC to SNF  Patient Goals: rest  Family Goals: n/a    Progress made toward(s) clinical / shift goals:    Problem: Knowledge Deficit - Standard  Goal: Patient and family/care givers will demonstrate understanding of plan of care, disease process/condition, diagnostic tests and medications  Outcome: Progressing     Problem: Skin Integrity  Goal: Skin integrity is maintained or improved  Outcome: Progressing     Problem: Fall Risk  Goal: Patient will remain free from falls  Outcome: Progressing

## 2023-12-22 NOTE — DISCHARGE PLANNING
Still waiting for a bed at [facility]. Per Shanta JARVIS RN she was told by Nahed they possibly will have bed today or tomorrow.

## 2023-12-22 NOTE — PROGRESS NOTES
Telemetry Shift Summary     Rhythm SB/SR first degree HB, BBB  HR : 45-55  Ectopy rPAC; rPVC  Measurements 0.24/0.12/0.48      Normal Values  Rhythm SR  HR Range    Measurements 0.12-0.20 / 0.06-0.10  / 0.30-0.52

## 2023-12-22 NOTE — CARE PLAN
The patient is Stable - Low risk of patient condition declining or worsening    Shift Goals  Clinical Goals: Q2 turns, Discharge  Patient Goals: Rest  Family Goals: n/a    Progress made toward(s) clinical / shift goals:    Problem: Knowledge Deficit - Standard  Goal: Patient and family/care givers will demonstrate understanding of plan of care, disease process/condition, diagnostic tests and medications  Outcome: Progressing     Problem: Skin Integrity  Goal: Skin integrity is maintained or improved  Outcome: Progressing     Problem: Fall Risk  Goal: Patient will remain free from falls  Outcome: Progressing     Problem: Pain - Standard  Goal: Alleviation of pain or a reduction in pain to the patient’s comfort goal  Outcome: Progressing

## 2023-12-22 NOTE — WOUND TEAM
Assisted Danuta MENDIETA (Wound RN), with wound care, non-selective debridement performed using wound cleanser/NS and gauze. Please see Danuta MENDIETA (Wound RN) wound note for further wound care details.
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
## 2023-12-22 NOTE — PROGRESS NOTES
12-hour chart check complete.    Monitor Summary  Rhythm: SB  Rate: 52  Ectopy: Rare PVC frequent PAC  Measurements: .24/.14/.44

## 2023-12-22 NOTE — PROGRESS NOTES
Pt arrived to floor from MED/TELE. Assumed care of patient. Discussed daily plan of care, oriented patient to floor. On RA, no SOB noted. Pt resting comfortably, wakes easily to voice. Pt denies complaints or concerns at this time. Call light and belongings within reach. Hourly rounding in place.      1600 Pt refused to have a skin check done.

## 2023-12-23 VITALS
HEART RATE: 47 BPM | SYSTOLIC BLOOD PRESSURE: 97 MMHG | TEMPERATURE: 96.8 F | OXYGEN SATURATION: 96 % | BODY MASS INDEX: 32.21 KG/M2 | RESPIRATION RATE: 18 BRPM | WEIGHT: 225 LBS | HEIGHT: 70 IN | DIASTOLIC BLOOD PRESSURE: 54 MMHG

## 2023-12-23 LAB — EKG IMPRESSION: NORMAL

## 2023-12-23 PROCEDURE — 94760 N-INVAS EAR/PLS OXIMETRY 1: CPT

## 2023-12-23 PROCEDURE — 700105 HCHG RX REV CODE 258: Performed by: INTERNAL MEDICINE

## 2023-12-23 PROCEDURE — 93010 ELECTROCARDIOGRAM REPORT: CPT | Performed by: INTERNAL MEDICINE

## 2023-12-23 PROCEDURE — A9270 NON-COVERED ITEM OR SERVICE: HCPCS | Performed by: INTERNAL MEDICINE

## 2023-12-23 PROCEDURE — 93005 ELECTROCARDIOGRAM TRACING: CPT | Performed by: HOSPITALIST

## 2023-12-23 PROCEDURE — 700111 HCHG RX REV CODE 636 W/ 250 OVERRIDE (IP): Mod: JZ | Performed by: INTERNAL MEDICINE

## 2023-12-23 PROCEDURE — 700102 HCHG RX REV CODE 250 W/ 637 OVERRIDE(OP): Performed by: INTERNAL MEDICINE

## 2023-12-23 PROCEDURE — 99239 HOSP IP/OBS DSCHRG MGMT >30: CPT | Performed by: INTERNAL MEDICINE

## 2023-12-23 PROCEDURE — 700111 HCHG RX REV CODE 636 W/ 250 OVERRIDE (IP): Performed by: INTERNAL MEDICINE

## 2023-12-23 RX ADMIN — DAPTOMYCIN 800 MG: 500 INJECTION, POWDER, LYOPHILIZED, FOR SOLUTION INTRAVENOUS at 12:04

## 2023-12-23 RX ADMIN — MEROPENEM 500 MG: 500 INJECTION, POWDER, FOR SOLUTION INTRAVENOUS at 00:07

## 2023-12-23 RX ADMIN — FERROUS SULFATE TAB 325 MG (65 MG ELEMENTAL FE) 325 MG: 325 (65 FE) TAB at 06:27

## 2023-12-23 RX ADMIN — MEROPENEM 500 MG: 500 INJECTION, POWDER, FOR SOLUTION INTRAVENOUS at 06:26

## 2023-12-23 RX ADMIN — MEROPENEM 500 MG: 500 INJECTION, POWDER, FOR SOLUTION INTRAVENOUS at 13:04

## 2023-12-23 RX ADMIN — BACLOFEN 20 MG: 10 TABLET ORAL at 11:59

## 2023-12-23 RX ADMIN — SENNOSIDES 17.2 MG: 8.6 TABLET, FILM COATED ORAL at 06:27

## 2023-12-23 RX ADMIN — HEPARIN SODIUM 5000 UNITS: 5000 INJECTION, SOLUTION INTRAVENOUS; SUBCUTANEOUS at 06:28

## 2023-12-23 RX ADMIN — POLYETHYLENE GLYCOL 3350 1 PACKET: 17 POWDER, FOR SOLUTION ORAL at 06:27

## 2023-12-23 RX ADMIN — Medication 400 MG: at 06:27

## 2023-12-23 RX ADMIN — BACLOFEN 20 MG: 10 TABLET ORAL at 06:27

## 2023-12-23 RX ADMIN — DOCUSATE SODIUM 200 MG: 100 CAPSULE, LIQUID FILLED ORAL at 06:27

## 2023-12-23 ASSESSMENT — ENCOUNTER SYMPTOMS
HEADACHES: 0
INSOMNIA: 0
PHOTOPHOBIA: 0
CONSTIPATION: 0
CHILLS: 0
MYALGIAS: 0
DEPRESSION: 0
SPEECH CHANGE: 0
BLURRED VISION: 0
NERVOUS/ANXIOUS: 0
HEARTBURN: 0
ABDOMINAL PAIN: 0
WEAKNESS: 0
FEVER: 0
COUGH: 0
VOMITING: 0
CLAUDICATION: 0
DIARRHEA: 0
SENSORY CHANGE: 0
DIZZINESS: 0
SHORTNESS OF BREATH: 0

## 2023-12-23 NOTE — PROGRESS NOTES
Dr. Gonzalez notified that pt is sustaining a HR in the upper 30s. EKG ordered by MD. EKG completed and discussed with primary RN. Pt remains asymptomatic.

## 2023-12-23 NOTE — PROGRESS NOTES
Received report from marina RN. Pt repositioned in bed, denies needs at this time. Wound vac therapy continued. Safety precautions in place, call light within reach. All needs met at this time.    2215 Pt declines continuous pulse ox monitoring, education provided, pt continues to decline.
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
## 2023-12-23 NOTE — DISCHARGE PLANNING
Patient to transfer to [facility] today at 1530 via [facility]. Peyton with [facility] advised of transport time. MATHEUS Recio advised.    1636  Per MATHEUS Recio request, transport arranged for patient's power chair to be transported to  [facility] today at [phone] via [facility]. RSV [phone]. Cost will be $167.12. MATHEUS Recio advised of transport time.

## 2023-12-23 NOTE — PROGRESS NOTES
Telemetry Shift Summary     Rhythm: SB w/ 1st degree  HR: 43-55  Ectopy: rpVC, rPAC  Measurements: 24/10/48    Normal Values  Rhythm: SR  HR:   Measurements: 0.12-0.20 / 0.04-0.10 / 0.30-0.52    Strip reviewed and placed in chart

## 2023-12-23 NOTE — PROGRESS NOTES
0322 Received update from monitor tech, pt's HR down to 38, sustaining in 30's. Pt is asymptomatic. Stat EKG ordered by MD hospitalist.

## 2023-12-23 NOTE — PROGRESS NOTES
Bedside report received from night RN. Assumed care of patient. Daily plan of care discussed. Pt awake and alert, denies complaints or concerns this AM. Pt continues to refuse 2 RN skin check at this time. 12 hour chart check complete. Hourly rounding in place.

## 2023-12-23 NOTE — CARE PLAN
The patient is Stable - Low risk of patient condition declining or worsening    Shift Goals  Clinical Goals: Continue pt on woundvac therapy; assist pt with CHG bath/wipes  Patient Goals: Sleep and rest  Family Goals: NA    Progress made toward(s) clinical / shift goals:  Pt continued on woundvac therapy, IV abx and assisted with CHG bath; refused two RN skin check; education provided. No complaints of pain overnight. HR went down to 38 to low 40's.      Problem: Knowledge Deficit - Standard  Goal: Patient and family/care givers will demonstrate understanding of plan of care, disease process/condition, diagnostic tests and medications  Outcome: Progressing     Problem: Skin Integrity  Goal: Skin integrity is maintained or improved  Outcome: Progressing     Problem: Fall Risk  Goal: Patient will remain free from falls  Outcome: Progressing     Problem: Pain - Standard  Goal: Alleviation of pain or a reduction in pain to the patient’s comfort goal  Outcome: Progressing     Problem: Wound/ / Incision Healing  Goal: Patient's wound/surgical incision will decrease in size and heals properly  Outcome: Progressing     Patient is not progressing towards the following goals: NA

## 2023-12-23 NOTE — PROGRESS NOTES
Pt declines 2 RN skin check, Luis Enrique Score 12, education provided regarding pt's increased risk for skin breakdown, continues to decline. Charge RN aware.

## 2023-12-23 NOTE — DISCHARGE PLANNING
Case Management Discharge Planning    Admission Date: 12/11/2023  GMLOS: 7.8  ALOS: 11    6-Clicks ADL Score: 14  6-Clicks Mobility Score: 8  PT and/or OT Eval ordered: Yes  Post-acute Referrals Ordered: Yes  Post-acute Choice Obtained: Yes  Has referral(s) been sent to post-acute provider:  Yes      Anticipated Discharge Dispo: Discharge Disposition: Disch to a long term care facility (63)    DME Needed: No    Action(s) Taken:     Spoke to Peyton from [facility]. Per Peyton, they are planning to admit pt tomorrow Sunday 12/24 but will reach out to RNCM if bed becomes available today.    Per Peyton bed available today. Transport request faxed to [facility]. DPA confirmed transport scheduled for today at 1530 via [facility].      RNCM notified [facility] is at bedside to  pt but ambulance is not large enough to fit pt's power WC.    RNCM escalated to  supervisor.    RNCM emailed transport request to [facility] for PowerWC to be transported to [facility], confirmation pending. Peyton from [facility] aware.    1632:Per TATIANA, [facility] to  pt's Power WC and deliver to [facility] today at [phone].     Escalations Completed: None    Medically Clear: Yes    Next Steps: f/u with Peyton from [facility] regarding bed availability    Barriers to Discharge: Pending Placement

## 2023-12-23 NOTE — PROGRESS NOTES
Telemetry Shift Summary    Rhythm SB  HR Range 36-50s, first deg block, BBB  Ectopy O PAC  Measurements 0.24/0.14/0.48        Normal Values  Rhythm SR  HR Range    Measurements 0.12-0.20 / 0.06-0.10  / 0.30-0.52
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
## 2023-12-23 NOTE — PROGRESS NOTES
Telephone report given to RN at [facility]. All questions answered. Callback number left.

## 2023-12-24 NOTE — PROGRESS NOTES
Pt discharged to [facility] via ambulance van with [facility]. Discharge paperwork reviewed and signed. Prescribed home medications reviewed with pt per discharge summary. VSS. Pt escorted off unit via gurney with [place] [facility] staff.    Per RN CM pt's power chair will be picked up and transported by [facility].

## 2023-12-26 ENCOUNTER — TELEPHONE (OUTPATIENT)
Dept: INFECTIOUS DISEASES | Facility: MEDICAL CENTER | Age: 73
End: 2023-12-26
Payer: MEDICARE

## 2024-01-08 LAB
FUNGUS SPEC CULT: NORMAL
FUNGUS SPEC FUNGUS STN: NORMAL
SIGNIFICANT IND 70042: NORMAL
SITE SITE: NORMAL
SOURCE SOURCE: NORMAL

## 2024-01-31 ENCOUNTER — OFFICE VISIT (OUTPATIENT)
Dept: WOUND CARE | Facility: MEDICAL CENTER | Age: 74
End: 2024-01-31
Attending: NURSE PRACTITIONER
Payer: MEDICARE

## 2024-01-31 ENCOUNTER — TELEPHONE (OUTPATIENT)
Dept: CARDIOLOGY | Facility: MEDICAL CENTER | Age: 74
End: 2024-01-31

## 2024-01-31 VITALS
SYSTOLIC BLOOD PRESSURE: 134 MMHG | TEMPERATURE: 98 F | HEART RATE: 80 BPM | DIASTOLIC BLOOD PRESSURE: 66 MMHG | OXYGEN SATURATION: 95 % | RESPIRATION RATE: 18 BRPM

## 2024-01-31 DIAGNOSIS — L89.314 PRESSURE INJURY OF RIGHT ISCHIUM, STAGE 4 (HCC): ICD-10-CM

## 2024-01-31 DIAGNOSIS — M86.9 OSTEOMYELITIS OF LEFT LOWER EXTREMITY (HCC): ICD-10-CM

## 2024-01-31 DIAGNOSIS — T81.31XD POSTOPERATIVE WOUND DEHISCENCE, SUBSEQUENT ENCOUNTER: ICD-10-CM

## 2024-01-31 DIAGNOSIS — T14.8XXA DEEP TISSUE INJURY: ICD-10-CM

## 2024-01-31 DIAGNOSIS — L89.894 PRESSURE INJURY OF LEFT FOOT, STAGE 4 (HCC): ICD-10-CM

## 2024-01-31 DIAGNOSIS — G82.54 QUADRIPLEGIA, C5-C7 INCOMPLETE (HCC): ICD-10-CM

## 2024-01-31 DIAGNOSIS — L89.893 PRESSURE INJURY OF LEFT LEG, STAGE 3 (HCC): ICD-10-CM

## 2024-01-31 DIAGNOSIS — L89.154 SACRAL DECUBITUS ULCER, STAGE IV (HCC): ICD-10-CM

## 2024-01-31 DIAGNOSIS — L89.623 PRESSURE INJURY OF LEFT HEEL, STAGE 3 (HCC): ICD-10-CM

## 2024-01-31 PROBLEM — L89.44: Status: ACTIVE | Noted: 2024-01-31

## 2024-01-31 PROBLEM — A41.9 SEPSIS SECONDARY TO UTI (HCC): Status: RESOLVED | Noted: 2023-06-17 | Resolved: 2024-01-31

## 2024-01-31 PROBLEM — R65.10 SIRS (SYSTEMIC INFLAMMATORY RESPONSE SYNDROME) (HCC): Status: RESOLVED | Noted: 2023-12-12 | Resolved: 2024-01-31

## 2024-01-31 PROBLEM — N39.0 SEPSIS SECONDARY TO UTI (HCC): Status: RESOLVED | Noted: 2023-06-17 | Resolved: 2024-01-31

## 2024-01-31 PROCEDURE — 11042 DBRDMT SUBQ TIS 1ST 20SQCM/<: CPT | Mod: 59 | Performed by: NURSE PRACTITIONER

## 2024-01-31 PROCEDURE — 3075F SYST BP GE 130 - 139MM HG: CPT | Performed by: NURSE PRACTITIONER

## 2024-01-31 PROCEDURE — 11043 DBRDMT MUSC&/FSCA 1ST 20/<: CPT

## 2024-01-31 PROCEDURE — 3078F DIAST BP <80 MM HG: CPT | Performed by: NURSE PRACTITIONER

## 2024-01-31 PROCEDURE — 11043 DBRDMT MUSC&/FSCA 1ST 20/<: CPT | Performed by: NURSE PRACTITIONER

## 2024-01-31 PROCEDURE — 97605 NEG PRS WND THER DME<=50SQCM: CPT

## 2024-01-31 PROCEDURE — 99214 OFFICE O/P EST MOD 30 MIN: CPT | Mod: 25

## 2024-01-31 PROCEDURE — 11042 DBRDMT SUBQ TIS 1ST 20SQCM/<: CPT | Mod: XS

## 2024-01-31 PROCEDURE — 99214 OFFICE O/P EST MOD 30 MIN: CPT | Mod: 25 | Performed by: NURSE PRACTITIONER

## 2024-01-31 ASSESSMENT — ENCOUNTER SYMPTOMS
VOMITING: 0
FEVER: 0
ROS GI COMMENTS: COLOSTOMY
SHORTNESS OF BREATH: 0
NAUSEA: 0
COUGH: 0
DIARRHEA: 0
CONSTIPATION: 0
CHILLS: 0

## 2024-01-31 NOTE — TELEPHONE ENCOUNTER
Caller: Osvaldo Pate    Topic/issue: MEDICAL ADVICE    Anjum is calling in to inform  that the battery in his [employer] MONITOR is dead and the monitor is no longer working. He would like to know what he should do now. Please advise.    Thank you,  Adrian CAO    Callback Number: [phone] (home)

## 2024-02-01 NOTE — TELEPHONE ENCOUNTER
Scheduling- Can we get patient scheduled for a follow up in office with MARY DE ANDA? Thank you!!!

## 2024-02-01 NOTE — PROGRESS NOTES
Provider Encounter- Pressure Injury        HISTORY OF PRESENT ILLNESS  Wound History:    START OF CARE IN CLINIC: 1/31/2024 (return to clinic after hospitalization)    REFERRING PROVIDER: Racquel York       WOUNDS-superior coccyx pressure injury, stage IV                                 Coccyx pressure injury, stage IV           Inferior coccyx pressure injury, stage IV                                 Right ischial pressure injury, stage IV                                 Left heel pressure injury, recurring stage III                                 Left posterior lower leg/calf-stage III                                 Left medial lower leg surgical wound dehiscence-resolved, reopens frequently            HISTORY: Patient with history of incomplete quadriplegia referred to [facility] for treatment of a stage IV pressure injury.  He has a history of previous pressure injuries to this area, and underwent muscle flaps in 2019, and then again in 2020.  He was seen in the wound clinic in November 2021 for an ulcer proximal from his current ulcer, and pressure injuries to his left posterior lower leg and left heel.  At that time, it was discovered that the patient had retained VAC foam embedded in the wound bed of the sacral wound.  Attempts were made to get him back to his plastic surgeon, though unsuccessful.  In January he underwent surgical removal of VAC sponge along with excisional debridement of his sacral wound by Dr. Chaves.  After the surgery, his wound went on to heal without incident.   In early April 2022, his home health nurse noted a new sacral ulcer, below the previous ulcer which quickly tripled in size over the following weeks.  The ulcer to his left medial lower leg had also deteriorated, with bone visible at the base..  He was hospitalized from 4/22 until 4/27/2022 and underwent surgery with Dr. Raman on 4/26 for irrigation and debridement of multiple compartments of the left lower extremity, bone  excision, and complex closure of chronic wound using biologic skin substitute.   His sacrococcygeal wound was not surgically addressed during this admission.  He was discharged back to his group home, with home health, and referral to outpatient wound clinic for his sacral wound.  He was instructed to follow-up with his surgeon for his lower leg wound.       Postoperatively, the left medial lower leg incision dehisced.  He was seen by his surgeon at [facility] on 5/11.  The surgeon opted to leave remaining sutures in place, and refer him to the wound clinic for treatment of this wound.   Treatment of this wound was initiated in clinic on 5/12.  During this visit was also noted that his heel DTI had resolved, but that he had a new pressure injury to his left posterior lower extremity.     A new pressure injury was noted to patient's right upper buttock/lower back on 5/20/2022.  Wound was linear in shape, skin discolored but intact.     Abrasion noted to left anterior lower leg.  First observed in clinic on 7/22/2022.  Patient states he bumped his leg into his food tray.     Small DTI noted to patient's left lateral lower leg on 7/29/2022.  Skin intact but discolored.     Large area of deep tissue injury noted to patient's left exterior lower leg.  Patient denied any trauma to this area.  Skin intact.  Wound documented.    1/27/2023: Patient was admitted to [facility] from 1/23-1/25/2023 with gross hematuria. He underwent RICHRAD which showed watchman device was in place and he was taken off of Xarelto. While hospitalized wound team was consulted. He was referred back to [facility] and home health upon discharge.    Patient was hospitalized at [facility] for pyelonephritis from 2/26 until 3/2/2023, admitted for fever and general malaise.  He was admitted and initially started on linezolid and meropenem for suspected UTI and history of multidrug-resistant organisms.  Urine cultures were negative. ID was consulted, recommended CT of chest and  abdomen,which were negative for acute findings. However, he was treated with 5 days total course of antibiotics for suspected UTI, and symptoms completely resolved.  During this admission, the inpatient wound team was consulted for treatment of his sacral and lower leg wounds.  A wound culture was taken from his left heel pressure ulcer, negative.  Once stabilized, he was discharged home and referred back to [facility] to resume treatment of his wounds.    Patient was hospitalized at [facility] from 12/11 until 12/23/2023, admitted for fever.  Wound infection suspected.  CT scan of abdomen and pelvis for evaluation of sacral pressure injury showed gas tracking down to the bone consistent with osteomyelitis.  He underwent I&D of right ischial ulcer (documented as buttock) with Dr. Bansal, medial tract leading to an abscess was identified.  Cavity was opened allowing it to drain into the main wound bed.  Wound VAC was placed and managed by wound team during this admission.  A bone scan of patient's left foot was also done, initially concerning for osteomyelitis.  Orthopedic surgery was consulted and did not recommend surgical intervention. ID consulted also, recommended the patient to receive IV ertapenem 1 g every 24 hours plus IV daptomycin 8 mg/kg every 24 hours through 1/22/2024.   He was discharged to LTAC on 1/23 for IV antibiotics and wound care.  From the LTAC he was discharged home on 1/22 with home health and referral back to [facility] to resume management of his wounds.      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  Never used smokeless tobacco    Patient's problem list, allergies, and current  medications reviewed and updated in Epic    Interval History:  Interval History thinned 7/29/2022.  Please see previous notes for complete interval history.   Interval History thinned 1/27/2023. Please see previous note for complete interval history.  Interval History thinned 3/3/2023.  Please see previous notes for complete interval history.    Interval History thinned 8/4/2023.  Please see previous notes for complete interval history.    Interval History thinned 1/31/2024.  Please see previous notes for complete interval history.      1/31/2024 Initial clinic visit with Kelly Sandy, APRN, FNP-BC, CWOCN, CFCN.   Patient returns to clinic after hospitalization and LTAC stay.  VAC in place to right ischial wound.  Sacral wounds have progressed significantly since he was last seen in clinic.  Left medial wound has healed, though covered with friable tissue.  Left heel ulcer, previously resolved has reopened slightly.  He states that he is feeling well overall, denies fevers, chills, nausea, vomiting, cough or shortness of breath.             REVIEW OF SYSTEMS:   Review of Systems   Constitutional:  Negative for chills and fever.   Respiratory:  Negative for cough and shortness of breath.    Cardiovascular:  Negative for chest pain.   Gastrointestinal:  Negative for constipation, diarrhea, nausea and vomiting.        Colostomy   Genitourinary:         Suprapubic catheter   Neurological:         Paraplegic         PHYSICAL EXAMINATION:   /66   Pulse 80   Temp 36.7 °C (98 °F)   Resp 18   SpO2 95%   Physical Exam  Constitutional:       Appearance: He is obese.   Cardiovascular:      Rate and Rhythm: Normal rate.   Pulmonary:      Effort: Pulmonary effort is normal.   Abdominal:      Comments: Colostomy left lower quadrant   Genitourinary:     Comments: Suprapubic catheter to down drain   Skin:     Comments: Stage IV coccygeal pressure injury -2 wounds, superior and inferior.  Area and depth of both wounds has  decreased significantly since last seen in the clinic.  Superior wound nearly resolved.  Thin layer of slough to wound bed of inferior wound, moderate serosanguineous drainage, no evidence of infection    Stage IV pressure injury to right ischium-probes to bone, necrotic tissue at base of wound, foul odor noted, moderate serosanguineous drainage, no periwound erythema or induration    Full-thickness wound to left medial lower leg, surgical wound dehiscence-covered with friable epithelium, no drainage.  History of previous recurrence    Stage III pressure injury left posterior heel -r previously healed, has reopened, thin layer of slough to wound bed, callus to periwound, no evidence of infection      Stage IV pressure injury plantar foot -skin is intact, the discolored, possible DTI      Stage III pressure injury to posterior left lower leg-thinness at wound bed, moderate serosanguineous drainage, no periwound erythema or induration             Neurological:      Mental Status: He is alert and oriented to person, place, and time.   Psychiatric:         Mood and Affect: Mood normal.         WOUND ASSESSMENT  Wound 06/18/23 Pressure Injury Leg Posterior Left resolved 9/1/23, re-opened 11/17/23 (Active)   Wound Image    01/31/24 1500   Site Assessment Red;Yellow 01/31/24 1500   Periwound Assessment Dry;Scar tissue;Purple;Edema 01/31/24 1500   Margins Attached edges 01/31/24 1500   Closure Secondary intention 12/06/23 1600   Drainage Amount Moderate 01/31/24 1500   Drainage Description Serosanguineous 01/31/24 1500   Treatments Cleansed;Provider debridement 01/31/24 1500   Wound Cleansing Hypochlorus Acid 01/31/24 1500   Periwound Protectant Barrier Paste;Skin Moisturizer 01/31/24 1500   Dressing Changed New 01/31/24 1500   Dressing Cleansing/Solutions Not Applicable 01/31/24 1500   Dressing Options Hydrofiber Silver;Silicone Adhesive Foam;Tubigrip 01/31/24 1500   Dressing Change/Treatment Frequency Monday, Wednesday,  Friday, and As Needed 01/31/24 1500   Wound Team Following Weekly 01/31/24 1500   WOUND NURSE ONLY - Pressure Injury Stage 3 01/31/24 1500   Non-staged Wound Description Not applicable 01/31/24 1500   Wound Length (cm) 2.3 cm 01/31/24 1500   Wound Width (cm) 2.3 cm 01/31/24 1500   Wound Depth (cm) 0.2 cm 01/31/24 1500   Wound Surface Area (cm^2) 5.29 cm^2 01/31/24 1500   Wound Volume (cm^3) 1.058 cm^3 01/31/24 1500   Post-Procedure Length (cm) 2.4 cm 01/31/24 1500   Post-Procedure Width (cm) 2.4 cm 01/31/24 1500   Post-Procedure Depth (cm) 0.3 cm 01/31/24 1500   Post-Procedure Surface Area (cm^2) 5.76 cm^2 01/31/24 1500   Post-Procedure Volume (cm^3) 1.728 cm^3 01/31/24 1500   Wound Healing % -5 01/31/24 1500   Tunneling (cm) 0 cm 01/31/24 1500   Undermining (cm) 0 cm 01/31/24 1500   Wound Odor None 01/31/24 1500   Exposed Structures None 01/31/24 1500   Number of days: 227       Wound 09/13/23 Pressure Injury Coccyx Superior (Active)   Wound Image    01/31/24 1500   Site Assessment Pink;Red;Hypergranulation 01/31/24 1500   Periwound Assessment Blanchable erythema;Scar tissue;Other (Comment) 01/31/24 1500   Margins Attached edges 01/31/24 1500   Drainage Amount Small 01/31/24 1500   Drainage Description Serosanguineous 01/31/24 1500   Treatments Cleansed;Provider debridement 01/31/24 1500   Wound Cleansing Hypochlorus Acid 01/31/24 1500   Periwound Protectant Barrier Paste 01/31/24 1500   Dressing Status Old drainage 11/03/23 1515   Dressing Changed New 01/31/24 1500   Dressing Cleansing/Solutions Not Applicable 01/31/24 1500   Dressing Options Hydrofiber Silver;Hydrofiber Silver Strip;Offloading Dressing - Sacral 01/31/24 1500   Dressing Change/Treatment Frequency Monday, Wednesday, Friday, and As Needed 01/31/24 1500   Wound Team Following Weekly 01/31/24 1500   WOUND NURSE ONLY - Pressure Injury Stage 4 01/31/24 1500   Non-staged Wound Description Not applicable 12/06/23 1600   Wound Length (cm) 0.9 cm 01/31/24  1500   Wound Width (cm) 0.7 cm 01/31/24 1500   Wound Depth (cm) 0.2 cm 12/06/23 1600   Wound Surface Area (cm^2) 0.63 cm^2 01/31/24 1500   Wound Volume (cm^3) 0.15 cm^3 12/06/23 1600   Post-Procedure Length (cm) 3 cm 12/06/23 1600   Post-Procedure Width (cm) 0.3 cm 12/06/23 1600   Post-Procedure Depth (cm) 0.2 cm 12/06/23 1600   Post-Procedure Surface Area (cm^2) 0.9 cm^2 12/06/23 1600   Post-Procedure Volume (cm^3) 0.18 cm^3 12/06/23 1600   Wound Healing % 72 12/06/23 1600   Tunneling (cm) 0 cm 01/31/24 1500   Tunneling Clock Position of Wound 6 11/10/23 1400   Undermining (cm) 0 cm 01/31/24 1500   Undermining of Wound, 1st Location From 7 o'clock;To 9 o'clock 10/25/23 1500   Wound Odor None 01/31/24 1500   Exposed Structures None 01/31/24 1500   Number of days: 140       Wound 12/12/23 Pressure Injury Ischium Right (Active)   Wound Image    01/31/24 1500   Site Assessment Red;Grey;Yellow 01/31/24 1500   Periwound Assessment Fragile;Denuded;Blistered 01/31/24 1500   Margins Unattached edges 01/31/24 1500   Drainage Amount Large 01/31/24 1500   Drainage Description Serosanguineous 01/31/24 1500   Treatments Cleansed;Provider debridement;Irrigation 01/31/24 1500   Wound Cleansing Hypochlorus Acid 01/31/24 1500   Periwound Protectant Hydrocolloid;Skin Protectant Wipes to Periwound;Drape 01/31/24 1500   Dressing Changed New 01/31/24 1500   Dressing Cleansing/Solutions Not Applicable 01/31/24 1500   Dressing Options Puracyn Gel;Wound Vac;Offloading Dressing - Sacral 01/31/24 1500   Dressing Change/Treatment Frequency Monday, Wednesday, Friday, and As Needed 01/31/24 1500   Wound Team Following Weekly 01/31/24 1500   WOUND NURSE ONLY - Pressure Injury Stage 4 01/31/24 1500   Wound Length (cm) 3.7 cm 01/31/24 1500   Wound Width (cm) 4 cm 01/31/24 1500   Wound Depth (cm) 2.9 cm 01/31/24 1500   Wound Surface Area (cm^2) 14.8 cm^2 01/31/24 1500   Wound Volume (cm^3) 42.92 cm^3 01/31/24 1500   Post-Procedure Length (cm) 3.7  cm 01/31/24 1500   Post-Procedure Width (cm) 4 cm 01/31/24 1500   Post-Procedure Depth (cm) 3.2 cm 01/31/24 1500   Post-Procedure Surface Area (cm^2) 14.8 cm^2 01/31/24 1500   Post-Procedure Volume (cm^3) 47.36 cm^3 01/31/24 1500   Wound Healing % 23 01/31/24 1500   Undermining (cm) 3.7 cm 01/31/24 1500   Undermining of Wound, 1st Location From 9 o'clock;To 3 o'clock 01/31/24 1500   Wound Odor Strong 01/31/24 1500   Exposed Structures Bone;Fascia 01/31/24 1500   Number of days: 50       Wound 01/31/24 Pressure Injury Heel Posterior Left (Active)   Wound Image   01/31/24 1500   Site Assessment Red 01/31/24 1500   Periwound Assessment Crusted;Scabbed;Fragile 01/31/24 1500   Margins Attached edges 01/31/24 1500   Drainage Amount Small 01/31/24 1500   Drainage Description Serosanguineous 01/31/24 1500   Treatments Cleansed;Nonselective debridement 01/31/24 1500   Wound Cleansing Foam Cleanser/Washcloth 01/31/24 1500   Periwound Protectant Barrier Paste 01/31/24 1500   Dressing Cleansing/Solutions Not Applicable 01/31/24 1500   Dressing Options Hydrofiber Silver;Offloading Dressing - Heel;Tubigrip 01/31/24 1500   Dressing Change/Treatment Frequency Monday, Wednesday, Friday, and As Needed 01/31/24 1500   Wound Team Following Weekly 01/31/24 1500   WOUND NURSE ONLY - Pressure Injury Stage 3 01/31/24 1500   Wound Length (cm) 1.5 cm 01/31/24 1500   Wound Width (cm) 1.2 cm 01/31/24 1500   Wound Depth (cm) 0.1 cm 01/31/24 1500   Wound Surface Area (cm^2) 1.8 cm^2 01/31/24 1500   Wound Volume (cm^3) 0.18 cm^3 01/31/24 1500   Tunneling (cm) 0 cm 01/31/24 1500   Undermining (cm) 0 cm 01/31/24 1500   Wound Odor None 01/31/24 1500   Exposed Structures None 01/31/24 1500   Number of days: 0       Wound 01/31/24 Pressure Injury Coccyx Middle wound, previously closed but reopened 1/31/24 (Active)   Wound Image    01/31/24 1500   Site Assessment Red;Yellow;[place] 01/31/24 1500   Periwound Assessment Scar tissue;Fragile 01/31/24 1500    Margins Attached edges 01/31/24 1500   Drainage Amount Small 01/31/24 1500   Drainage Description Serosanguineous 01/31/24 1500   Treatments Cleansed;Provider debridement 01/31/24 1500   Wound Cleansing Hypochlorus Acid 01/31/24 1500   Periwound Protectant Barrier Paste 01/31/24 1500   Dressing Cleansing/Solutions Not Applicable 01/31/24 1500   Dressing Options Hydrofiber Silver;Other (Comments);Offloading Dressing - Sacral 01/31/24 1500   Dressing Change/Treatment Frequency Monday, Wednesday, Friday, and As Needed 01/31/24 1500   Wound Team Following Weekly 01/31/24 1500   WOUND NURSE ONLY - Pressure Injury Stage 3 01/31/24 1500   Wound Length (cm) 2.1 cm 01/31/24 1500   Wound Width (cm) 1.1 cm 01/31/24 1500   Wound Depth (cm) 0.3 cm 01/31/24 1500   Wound Surface Area (cm^2) 2.31 cm^2 01/31/24 1500   Wound Volume (cm^3) 0.693 cm^3 01/31/24 1500   Post-Procedure Length (cm) 2.2 cm 01/31/24 1500   Post-Procedure Width (cm) 1.2 cm 01/31/24 1500   Post-Procedure Depth (cm) 0.5 cm 01/31/24 1500   Post-Procedure Surface Area (cm^2) 2.64 cm^2 01/31/24 1500   Post-Procedure Volume (cm^3) 1.32 cm^3 01/31/24 1500   Tunneling (cm) 0 cm 01/31/24 1500   Undermining (cm) 0 cm 01/31/24 1500   Wound Odor None 01/31/24 1500   Exposed Structures None 01/31/24 1500   Number of days: 0       Wound 01/31/24 Pressure Injury Coccyx Inferior wound, previosly closed but reopened 1/31/24 (Active)   Wound Image    01/31/24 1500   Site Assessment Red;[place] 01/31/24 1500   Periwound Assessment Fragile;Scar tissue 01/31/24 1500   Margins Attached edges 01/31/24 1500   Drainage Amount Small 01/31/24 1500   Drainage Description Serosanguineous 01/31/24 1500   Treatments Cleansed;Provider debridement 01/31/24 1500   Wound Cleansing Hypochlorus Acid 01/31/24 1500   Periwound Protectant Barrier Paste 01/31/24 1500   Dressing Cleansing/Solutions Not Applicable 01/31/24 1500   Dressing Options Hydrofiber Silver;Other (Comments);Offloading Dressing -  Sacral 01/31/24 1500   Dressing Change/Treatment Frequency Monday, Wednesday, Friday, and As Needed 01/31/24 1500   Wound Team Following Weekly 01/31/24 1500   WOUND NURSE ONLY - Pressure Injury Stage 3 01/31/24 1500   Wound Length (cm) 0.7 cm 01/31/24 1500   Wound Width (cm) 0.7 cm 01/31/24 1500   Wound Depth (cm) 0.7 cm 01/31/24 1500   Wound Surface Area (cm^2) 0.49 cm^2 01/31/24 1500   Wound Volume (cm^3) 0.343 cm^3 01/31/24 1500   Post-Procedure Length (cm) 0.8 cm 01/31/24 1500   Post-Procedure Width (cm) 0.7 cm 01/31/24 1500   Post-Procedure Depth (cm) 0.7 cm 01/31/24 1500   Post-Procedure Surface Area (cm^2) 0.56 cm^2 01/31/24 1500   Post-Procedure Volume (cm^3) 0.392 cm^3 01/31/24 1500   Tunneling (cm) 0 cm 01/31/24 1500   Undermining (cm) 0 cm 01/31/24 1500   Wound Odor None 01/31/24 1500   Exposed Structures None 01/31/24 1500   Number of days: 0       PROCEDURE: Excisional debridement of sacral / coccygeal pressure injury ulcers  -Curette used to debride wound bed of middle and inferior wounds.  No debridement of superior wound.  Excisional debridement was performed to remove devitalized tissue until healthy, bleeding tissue was visualized.  Total wound area debrided 3.2 cm².  Tissue debrided into the subcutaneous layer  -Bleeding controlled with manual pressure.    -Wound care completed by wound RN, refer to flowsheet  -Patient tolerated the procedure well, without c/o pain or discomfort.      PROCEDURE: Excisional debridement of right ischial ulcer  - forceps and scissors used to lift and excise necrotic tissue from base of wound  -Curette used to debride wound bed.  Excisional debridement was performed to remove devitalized tissue until healthy, bleeding tissue was visualized.   Total area debrided approximately 15.0 cm².  Tissue debrided into the muscle/fascia layer.    -Bleeding controlled with manual pressure.    -Wound care completed by wound RN, refer to flowsheet  -Patient tolerated the procedure  "well, without c/o pain or discomfort.      PROCEDURE: Excisional debridement of left posterior lower leg wound  -Curette used to debride wound bed.  Excisional debridement was performed to remove devitalized tissue until healthy, bleeding tissue was visualized.   Entire surface of wound, 5.76 cm² debrided.  Tissue debrided into the subcutaneous layer.    -Bleeding controlled with manual pressure.    -Wound care completed by wound RN, refer to flowsheet  -Patient tolerated the procedure well, without c/o pain or discomfort.        No debridement of left medial leg wound or left heel ulcer required today      Pertinent Labs and Diagnostics:    Labs:     A1c: No results found for: \"HBA1C\"     [facility] results, 2022 (under media tab)    CRP 13    ESR 31      IMAGIN2023-CT of abdomen pelvis with contrast  IMPRESSION:   1.  Right ischial decubitus ulcer extending to bone with soft tissue gas tracking along the right perineum. Appearance suggesting osteomyelitis, consider component of necrotizing fasciitis as clinically appropriate.  2.  Small pericardial effusion  3.  Left adrenal nodule, density on prior noncontrast CT demonstrates adenoma.  4.  Hepatomegaly  5.  Enlarged prostate, workup and evaluation for causes of prostate enlargement recommended as clinically appropriate.  6.  Atherosclerosis and atherosclerotic coronary artery disease    12/15/2023-bone scan of left foot  IMPRESSION:     1.  Mild increased activity in the LEFT 1st and 3rd toes on blood pool and delayed images possibly indicating inflammation/infection.  2.  No significant blood flow asymmetry.          VASCULAR STUDIES: No results found.    LAST  WOUND CULTURE:   Lab Results   Component Value Date/Time    CULTRSULT  2023 08:04 PM     Culture discontinued due to excessive contamination.    CULTRSULT No fungal growth. 2023 08:04 PM    CULTRSULT - (A) 2023 08:04 PM    CULTRSULT Prevotella bivia  Moderate growth   (A) " 12/12/2023 08:04 PM    CULTRSULT - (A) 12/12/2023 08:04 PM    CULTRSULT Enterococcus faecalis  Heavy growth   (A) 12/12/2023 08:04 PM    CULTRSULT (A) 12/12/2023 08:04 PM     Proteus mirabilis ESBL  Moderate growth  Extended Spectrum Beta-lactamase (ESBL) isolated.  ESBL's may be clinically resistant to therapy with  Penicillins,Cephalosporins or Aztreonam despite  apparent in vitro susceptibility to some of these agents.  The patient requires contact isolation.  Please contact pharmacy or an Infectious Disease Specialist  if you have any questions about appropriate therapy.        PATHOLOGY  2/17/2023-bone fragment extracted from left lower extremity wound\\  FINAL DIAGNOSIS:     A. Left leg bone fragment at base of chronic wound:          Extensively degenerated fibrocartilaginous tissue with a rim of           fibrinopurulent debris          Correlate with culture findings            Comment: While no residual intact bone is identified, these           findings are suggestive of adjacent osteomyelitis.      ASSESSMENT AND PLAN:     1. Sacral decubitus ulcer, stage IV ([facility])  Comments: Ulcer first noted in early April 2022 as small open area which quickly enlarged.  This ulcer is present distal from previous sacral ulcer which healed after surgery in January.  Patient has history of flap reconstruction x2 to this area.      1/31/2024: Patient returns to clinic after hospitalization and LTAC stay.  2 coccygeal wounds, both of which have decreased significantly in area and depth.  Superior wound nearly resolved   - Excisional debridement of inferior wound was performed in clinic, medically necessary to promote wound healing.   -Patient to return to clinic weekly for assessment and debridement  -Home health to change dressing 2 times per week   -Patient does spend a lot of time up in his wheelchair, and wishes to continue to do so for his quality of life.  He lives independently, drives, and is involved with family  activities.  He does have an appropriate pressure-relief cushion and is very well versed on pressure relieving techniques.  - Known OM that was previously treated.  CT scan done during recent hospitalization did not show OM of sacrum, however OM of the ischium noted.    Wound care: Silver Hydrofiber to manage exudate and bioburden, foam cover dressing, Hypafix tape     2.  Pressure injury of right ischium, stage IV  Comments: Abscess and OM found on CT during hospitalization in December 2023.  Patient underwent I&D with VAC placement.  IV antibiotics through 1/22/2024.    1/31/2024: Previously documented stage III in clinic.  Patient underwent I&D due to abscess and osteomyelitis, depth of wound is now to muscle and bone, and a stage IV.  Patient has completed 6-week course of IV antibiotics.  Wound presents today with necrotic tissue at base, and foul odor.  -Excisional debridement of nonviable tissue from wound in clinic today, medically necessary to promote wound healing.  -Patient to return to clinic weekly for assessment and debridement  -Home health to change dressing 2 times per week in between clinic visits   -Patient is very well versed on pressure relief measures, has adequate surfaces in wheelchair and on his bed.    Wound care: Puracyn gel to base of wound NPWT at 125 mmHg to accelerate granulation, change 3 times per week.     3. Postoperative wound dehiscence, subsequent encounter  4. Osteomyelitis of left lower extremity  Comments: On 4/26/2022 patient underwent irrigation and debridement of multiple compartments of the left lower extremity states for pressure injury, with bone excision, and complex closure of chronic wound using biologic skin substitute.  During postop visit in surgeons office on 5/11, surgical site was noted to be dehisced.        1/31/2024: Medial leg wound has healed, though covered with fragile epithelium.  Historically this wound has closed and reopened several times.  Possible  osteomyelitis of left foot was noted on bone scan during hospitalization.  Ortho was consulted, did not recommend any surgery.  -No excisional debridement required today  -Patient to return to clinic weekly for assessment and debridement as needed  -Monitor site with each clinic visit.  This wound has a history of recurrence.  -Home health to change dressing 2 times per week in between clinic visits OM.  -Patient to be mindful of offloading when supine, should assure that his leg is not rotating medially    Wound care: Open to air    5. Deep tissue injury  6. Pressure injury of left foot, stage IV  Comments: DTI to posterior left lower leg first observed in clinic 7/29/2022.  Patient does not know how it started, had been wearing heel float boot consistently.  Evolved into stage IV pressure injury    1/31/2024: Plantar foot ulcer remains healed, though with discoloration of skin, possible DTI.  This wound has healed and reopened several times.  -No excisional debridement required today.  -Patient to return to clinic weekly for assessment and debridement  -Monitor site each clinic visit     Care: Open to air    7. Pressure injury of left heel, stage 3 ([facility])  Comments: First noted in clinic on 10/21/2022, originally noted to be stage II    1/31/2024: Ulcer previously healed, has reopened slightly.  -Excisional debridement of wound in clinic today, medically necessary to promote wound healing.  -Patient to return to clinic weekly for assessment and debridement  -Patient to change dressing 2 times per week in between clinic visits     Wound care: Silver Hydrofiber to manage exudate and bioburden, foam cover dressing, Hypafix tape     8.  Pressure injury of left leg, stage III    9.  Quadriplegia, C5-C7, incomplete ([facility])  Comments: Complicating factor.  Impaired mobility and sensation  -Patient is still spending 7-8 hours/day up in his wheelchair, though he does have appropriate offloading cushion, and knows to  reposition frequently.  Wears heel float boots bilaterally at all times  Wound care: Silver Hydrofiber to manage exudate and bioburden, foam cover dressing, Hypafix tape     My total time spent caring for the patient on the day of the encounter was 30 minutes.   This does not include time spent on separately billable procedures/tests.       Please note that this note may have been created using voice recognition software. I have worked with technical experts from [facility] to optimize the interface.  I have made every reasonable attempt to correct obvious errors, but there may be errors of grammar and possibly content that I did not discover before finalizing the note.

## 2024-02-01 NOTE — PATIENT INSTRUCTIONS
-Keep your wound dressing clean, dry, and intact.    -Remove your compression wrap if you have severe pain, severe swelling, numbness, color change, or temperature change in your toes. If you need to remove your compression wrap, do so by unrolling it. Do not cut the compression wrap off to prevent cutting yourself on accident.    -Wound vac may not have any drainage in tube or cannister & it will still be working.   Change cannister if it does become full by pressing tab on side of machine to remove canister and snap on new one. Full canister can be thrown in the trash. If cannister fills with bright red blood - go to ER. Dressing will be changed every week at the wound clinic.  If you are having issues with your wound VAC, please consider patching leaks, changing the canister, call home health, or calling [phone] for troubleshooting. If the wound VAC has been off or un-operational for over 2 hours, call wound care center to inform them and remove all dressings including black foam and replace with normal saline damp gauze.    -Should you experience any significant changes in your wound(s), such as infection (redness, swelling, localized heat, increased pain, fever > 101 F, chills) or have any questions regarding your home care instructions, please contact the wound center at [phone]. If after hours, contact your primary care physician or go to the hospital emergency room.

## 2024-02-07 ENCOUNTER — OFFICE VISIT (OUTPATIENT)
Dept: WOUND CARE | Facility: MEDICAL CENTER | Age: 74
End: 2024-02-07
Attending: NURSE PRACTITIONER
Payer: MEDICARE

## 2024-02-07 VITALS
HEART RATE: 75 BPM | SYSTOLIC BLOOD PRESSURE: 125 MMHG | DIASTOLIC BLOOD PRESSURE: 83 MMHG | TEMPERATURE: 98 F | OXYGEN SATURATION: 98 % | RESPIRATION RATE: 18 BRPM

## 2024-02-07 DIAGNOSIS — L89.623 PRESSURE INJURY OF LEFT HEEL, STAGE 3 (HCC): ICD-10-CM

## 2024-02-07 DIAGNOSIS — L89.893 PRESSURE INJURY OF LEFT LEG, STAGE 3 (HCC): ICD-10-CM

## 2024-02-07 DIAGNOSIS — M86.9 OSTEOMYELITIS OF LEFT LOWER EXTREMITY (HCC): ICD-10-CM

## 2024-02-07 DIAGNOSIS — L89.154 SACRAL DECUBITUS ULCER, STAGE IV (HCC): ICD-10-CM

## 2024-02-07 DIAGNOSIS — L89.894 PRESSURE INJURY OF LEFT FOOT, STAGE 4 (HCC): ICD-10-CM

## 2024-02-07 DIAGNOSIS — T81.31XD POSTOPERATIVE WOUND DEHISCENCE, SUBSEQUENT ENCOUNTER: ICD-10-CM

## 2024-02-07 DIAGNOSIS — L89.314 PRESSURE INJURY OF RIGHT ISCHIUM, STAGE 4 (HCC): ICD-10-CM

## 2024-02-07 DIAGNOSIS — G82.54 QUADRIPLEGIA, C5-C7 INCOMPLETE (HCC): ICD-10-CM

## 2024-02-07 DIAGNOSIS — T14.8XXA DEEP TISSUE INJURY: ICD-10-CM

## 2024-02-07 PROCEDURE — 11042 DBRDMT SUBQ TIS 1ST 20SQCM/<: CPT

## 2024-02-07 PROCEDURE — 11043 DBRDMT MUSC&/FSCA 1ST 20/<: CPT

## 2024-02-07 PROCEDURE — 11043 DBRDMT MUSC&/FSCA 1ST 20/<: CPT | Performed by: STUDENT IN AN ORGANIZED HEALTH CARE EDUCATION/TRAINING PROGRAM

## 2024-02-07 PROCEDURE — 11042 DBRDMT SUBQ TIS 1ST 20SQCM/<: CPT | Mod: 59 | Performed by: STUDENT IN AN ORGANIZED HEALTH CARE EDUCATION/TRAINING PROGRAM

## 2024-02-07 PROCEDURE — 3079F DIAST BP 80-89 MM HG: CPT | Performed by: STUDENT IN AN ORGANIZED HEALTH CARE EDUCATION/TRAINING PROGRAM

## 2024-02-07 PROCEDURE — 3074F SYST BP LT 130 MM HG: CPT | Performed by: STUDENT IN AN ORGANIZED HEALTH CARE EDUCATION/TRAINING PROGRAM

## 2024-02-07 RX ORDER — SODIUM HYPOCHLORITE 1.25 MG/ML
SOLUTION TOPICAL
Qty: 473 ML | Refills: 0 | Status: SHIPPED | OUTPATIENT
Start: 2024-02-07

## 2024-02-07 NOTE — PROGRESS NOTES
Provider Encounter- Pressure Injury        HISTORY OF PRESENT ILLNESS  Wound History:    START OF CARE IN CLINIC: 1/31/2024 (return to clinic after hospitalization)    REFERRING PROVIDER: Racquel York       WOUNDS-superior coccyx pressure injury, stage IV                                 Coccyx pressure injury, stage IV           Inferior coccyx pressure injury, stage IV                                 Right ischial pressure injury, stage IV                                 Left heel pressure injury, recurring stage III                                 Left posterior lower leg/calf-stage III                                 Left medial lower leg surgical wound dehiscence-resolved, reopens frequently            HISTORY: Patient with history of incomplete quadriplegia referred to [facility] for treatment of a stage IV pressure injury.  He has a history of previous pressure injuries to this area, and underwent muscle flaps in 2019, and then again in 2020.  He was seen in the wound clinic in November 2021 for an ulcer proximal from his current ulcer, and pressure injuries to his left posterior lower leg and left heel.  At that time, it was discovered that the patient had retained VAC foam embedded in the wound bed of the sacral wound.  Attempts were made to get him back to his plastic surgeon, though unsuccessful.  In January he underwent surgical removal of VAC sponge along with excisional debridement of his sacral wound by Dr. Chaves.  After the surgery, his wound went on to heal without incident.   In early April 2022, his home health nurse noted a new sacral ulcer, below the previous ulcer which quickly tripled in size over the following weeks.  The ulcer to his left medial lower leg had also deteriorated, with bone visible at the base..  He was hospitalized from 4/22 until 4/27/2022 and underwent surgery with Dr. Raman on 4/26 for irrigation and debridement of multiple compartments of the left lower extremity, bone  excision, and complex closure of chronic wound using biologic skin substitute.   His sacrococcygeal wound was not surgically addressed during this admission.  He was discharged back to his group home, with home health, and referral to outpatient wound clinic for his sacral wound.  He was instructed to follow-up with his surgeon for his lower leg wound.       Postoperatively, the left medial lower leg incision dehisced.  He was seen by his surgeon at [facility] on 5/11.  The surgeon opted to leave remaining sutures in place, and refer him to the wound clinic for treatment of this wound.   Treatment of this wound was initiated in clinic on 5/12.  During this visit was also noted that his heel DTI had resolved, but that he had a new pressure injury to his left posterior lower extremity.     A new pressure injury was noted to patient's right upper buttock/lower back on 5/20/2022.  Wound was linear in shape, skin discolored but intact.     Abrasion noted to left anterior lower leg.  First observed in clinic on 7/22/2022.  Patient states he bumped his leg into his food tray.     Small DTI noted to patient's left lateral lower leg on 7/29/2022.  Skin intact but discolored.     Large area of deep tissue injury noted to patient's left exterior lower leg.  Patient denied any trauma to this area.  Skin intact.  Wound documented.    1/27/2023: [place] [facility] [place] with gross hematuria. He underwent RICHARD which showed watchman device was in place and he was taken off of Xarelto. While hospitalized wound team was consulted. He was referred back to [facility] and home health upon discharge.    Patient was hospitalized at [facility] for pyelonephritis from 2/26 until 3/2/2023, admitted for fever and general malaise.  He was admitted and initially started on linezolid and meropenem for suspected UTI and history of multidrug-resistant organisms.  Urine cultures were negative. ID was consulted, recommended CT of chest and  abdomen,which were negative for acute findings. However, he was treated with 5 days total course of antibiotics for suspected UTI, and symptoms completely resolved.  During this admission, the inpatient wound team was consulted for treatment of his sacral and lower leg wounds.  A wound culture was taken from his left heel pressure ulcer, negative.  Once stabilized, he was discharged home and referred back to [facility] to resume treatment of his wounds.    Patient was hospitalized at [facility] from 12/11 until 12/23/2023, admitted for fever.  Wound infection suspected.  CT scan of abdomen and pelvis for evaluation of sacral pressure injury showed gas tracking down to the bone consistent with osteomyelitis.  He underwent I&D of right ischial ulcer (documented as buttock) with Dr. Bansal, medial tract leading to an abscess was identified.  Cavity was opened allowing it to drain into the main wound bed.  Wound VAC was placed and managed by wound team during this admission.  A bone scan of patient's left foot was also done, initially concerning for osteomyelitis.  Orthopedic surgery was consulted and did not recommend surgical intervention. ID consulted also, recommended the patient to receive IV ertapenem 1 g every 24 hours plus IV daptomycin 8 mg/kg every 24 hours through 1/22/2024.   He was discharged to LTAC on 1/23 for IV antibiotics and wound care.  From the LTAC he was discharged home on 1/22 with home health and referral back to [facility] to resume management of his wounds.      Pertinent Medical History: Incomplete quadriplegia, history of stage IV pressure injuries, history of flap procedures to sacral pressure injuries, osteomyelitis, obesity, colostomy in place   Contributing factors: Immobility and Obesity, impaired sensation    Personal support: Attendant-staff at [facility] and home health nursing    TOBACCO USE:   Former smoker, quit in 1977.  Never used smokeless tobacco    Patient's problem list, allergies, and current  medications reviewed and updated in Epic    Interval History:  Interval History thinned 7/29/2022.  Please see previous notes for complete interval history.   Interval History thinned 1/27/2023. Please see previous note for complete interval history.  Interval History thinned 3/3/2023.  Please see previous notes for complete interval history.    Interval History thinned 8/4/2023.  Please see previous notes for complete interval history.    Interval History thinned 1/31/2024.  Please see previous notes for complete interval history.      1/31/2024 Initial clinic visit with ADILSON Arthur, FNP-BC, CWOCN, CFCN.   Patient returns to clinic after hospitalization and LTAC stay.  VAC in place to right ischial wound.  Sacral wounds have progressed significantly since he was last seen in clinic.  Left medial wound has healed, though covered with friable tissue.  Left heel ulcer, previously resolved has reopened slightly.  He states that he is feeling well overall, denies fevers, chills, nausea, vomiting, cough or shortness of breath.     2/7/2024: Clinic visit with Steve Hodges MD. Patient reports feeling in normal state of health. Patient denies any alarms from wound VAC, however today he presented with significant odor from ischial wound and dressing was partially avulsed leaving in adequate suction. Machine was checked and functioning well, there were no recorded alarms.    REVIEW OF SYSTEMS:   Unchanged from previous wound clinic assessment on 1/31/2024, except as noted in interval history above        PHYSICAL EXAMINATION:   /83   Pulse 75   Temp 36.7 °C (98 °F) (Temporal)   Resp 18   SpO2 98%   Physical Exam  Constitutional:       Appearance: He is obese.   Cardiovascular:      Rate and Rhythm: Normal rate.   Pulmonary:      Effort: Pulmonary effort is normal.   Abdominal:      Comments: Colostomy left lower quadrant   Genitourinary:     Comments: Suprapubic catheter to down drain   Skin:     Comments:  Stage IV coccygeal pressure injury -2 wounds, superior and inferior.  Wounds improving.    Stage IV pressure injury to right ischium- Wound largely unchanged, bone remains palpable in wound bed. Increased odor and some necrotic tissue. No periwound infection noted.    Full-thickness wound to left medial lower leg, surgical wound dehiscence-covered with friable epithelium, no drainage.  History of previous recurrence    Stage III pressure injury left posterior heel - Stable, appears superficial and healing    Stage IV pressure injury plantar foot -skin is intact, the discolored, possible DTI    Stage III pressure injury to posterior left lower leg- Stable     Neurological:      Mental Status: He is alert and oriented to person, place, and time.   Psychiatric:         Mood and Affect: Mood normal.         WOUND ASSESSMENT  Wound 06/18/23 Pressure Injury Leg Posterior Left resolved 9/1/23, re-opened 11/17/23 (Active)   Wound Image    02/07/24 1500   Site Assessment Red;Yellow 02/07/24 1500   Periwound Assessment Blanchable erythema;Scar tissue;Fragile 02/07/24 1500   Margins Attached edges 02/07/24 1500   Closure Secondary intention 12/06/23 1600   Drainage Amount Small 02/07/24 1500   Drainage Description Serosanguineous 02/07/24 1500   Treatments Cleansed;Provider debridement 02/07/24 1500   Wound Cleansing Hypochlorus Acid 02/07/24 1500   Periwound Protectant Skin Moisturizer;Barrier Paste 02/07/24 1500   Dressing Changed New 02/07/24 1500   Dressing Cleansing/Solutions Not Applicable 02/07/24 1500   Dressing Options Hydrofiber Silver;Silicone Adhesive Foam;Tubigrip 02/07/24 1500   Dressing Change/Treatment Frequency Monday, Wednesday, Friday, and As Needed 02/07/24 1500   Wound Team Following Weekly 02/07/24 1500   WOUND NURSE ONLY - Pressure Injury Stage 3 02/07/24 1500   Non-staged Wound Description Not applicable 01/31/24 1500   Wound Length (cm) 0.7 cm 02/07/24 1500   Wound Width (cm) 1.2 cm 02/07/24 1500    Wound Depth (cm) 0.1 cm 02/07/24 1500   Wound Surface Area (cm^2) 0.84 cm^2 02/07/24 1500   Wound Volume (cm^3) 0.084 cm^3 02/07/24 1500   Post-Procedure Length (cm) 0.8 cm 02/07/24 1500   Post-Procedure Width (cm) 1.3 cm 02/07/24 1500   Post-Procedure Depth (cm) 0.1 cm 02/07/24 1500   Post-Procedure Surface Area (cm^2) 1.04 cm^2 02/07/24 1500   Post-Procedure Volume (cm^3) 0.104 cm^3 02/07/24 1500   Wound Healing % 92 02/07/24 1500   Tunneling (cm) 0 cm 02/07/24 1500   Undermining (cm) 0 cm 02/07/24 1500   Wound Odor None 02/07/24 1500   Exposed Structures None 02/07/24 1500   Number of days: 236       Wound 12/12/23 Pressure Injury Ischium Right (Active)   Wound Image    02/07/24 1500   Site Assessment Red;Yellow;Grey;Brown 02/07/24 1500   Periwound Assessment Fragile 02/07/24 1500   Margins Unattached edges 02/07/24 1500   Drainage Amount Large 02/07/24 1500   Drainage Description Serosanguineous 02/07/24 1500   Treatments Cleansed;Provider debridement;Irrigation 02/07/24 1500   Wound Cleansing Dakin's Solution 02/07/24 1500   Periwound Protectant Skin Protectant Wipes to Periwound;Drape 02/07/24 1500   Dressing Changed Changed 02/07/24 1500   Dressing Cleansing/Solutions Not Applicable 02/07/24 1500   Dressing Options Puracyn Gel;Wound Vac;Offloading Dressing - Sacral 02/07/24 1500   Dressing Change/Treatment Frequency Monday, Wednesday, Friday, and As Needed 02/07/24 1500   Wound Team Following Weekly 02/07/24 1500   WOUND NURSE ONLY - Pressure Injury Stage 4 02/07/24 1500   Wound Length (cm) 4 cm 02/07/24 1500   Wound Width (cm) 4 cm 02/07/24 1500   Wound Depth (cm) 3.4 cm 02/07/24 1500   Wound Surface Area (cm^2) 16 cm^2 02/07/24 1500   Wound Volume (cm^3) 54.4 cm^3 02/07/24 1500   Post-Procedure Length (cm) 4 cm 02/07/24 1500   Post-Procedure Width (cm) 4 cm 02/07/24 1500   Post-Procedure Depth (cm) 3.5 cm 02/07/24 1500   Post-Procedure Surface Area (cm^2) 16 cm^2 02/07/24 1500   Post-Procedure Volume  (cm^3) 56 cm^3 02/07/24 1500   Wound Healing % 3 02/07/24 1500   Tunneling (cm) 0 cm 02/07/24 1500   Undermining (cm) 4.5 cm 02/07/24 1500   Undermining of Wound, 1st Location From 9 o'clock;To 3 o'clock 02/07/24 1500   Wound Odor Strong;Foul 02/07/24 1500   Exposed Structures Bone;Fascia 02/07/24 1500   Number of days: 59       Wound 01/31/24 Pressure Injury Heel Posterior Left (Active)   Wound Image    02/07/24 1500   Site Assessment Scabbed 02/07/24 1500   Periwound Assessment Blanchable erythema;Crusted 02/07/24 1500   Margins Attached edges 02/07/24 1500   Drainage Amount Small 02/07/24 1500   Drainage Description Serosanguineous 02/07/24 1500   Treatments Cleansed;Provider debridement 02/07/24 1500   Wound Cleansing Hypochlorus Acid 02/07/24 1500   Periwound Protectant Barrier Paste;Skin Moisturizer 02/07/24 1500   Dressing Cleansing/Solutions Not Applicable 02/07/24 1500   Dressing Options Hydrofiber Silver;Offloading Dressing - Heel;Tubigrip 02/07/24 1500   Dressing Change/Treatment Frequency Monday, Wednesday, Friday, and As Needed 02/07/24 1500   Wound Team Following Weekly 02/07/24 1500   WOUND NURSE ONLY - Pressure Injury Stage 3 02/07/24 1500   Wound Length (cm) 1.5 cm 01/31/24 1500   Wound Width (cm) 1.2 cm 01/31/24 1500   Wound Depth (cm) 0.1 cm 01/31/24 1500   Wound Surface Area (cm^2) 1.8 cm^2 01/31/24 1500   Wound Volume (cm^3) 0.18 cm^3 01/31/24 1500   Post-Procedure Length (cm) 0.4 cm 02/07/24 1500   Post-Procedure Width (cm) 0.4 cm 02/07/24 1500   Post-Procedure Depth (cm) 0.1 cm 02/07/24 1500   Post-Procedure Surface Area (cm^2) 0.16 cm^2 02/07/24 1500   Post-Procedure Volume (cm^3) 0.016 cm^3 02/07/24 1500   Tunneling (cm) 0 cm 02/07/24 1500   Undermining (cm) 0 cm 02/07/24 1500   Wound Odor None 02/07/24 1500   Exposed Structures None 02/07/24 1500   Number of days: 9       Wound 01/31/24 Pressure Injury Coccyx Middle wound, previously closed but reopened 1/31/24 (Active)   Wound Image     02/07/24 1500   Site Assessment Red;Yellow;[place] 02/07/24 1500   Periwound Assessment Scar tissue;Blanchable erythema;Fragile 02/07/24 1500   Margins Attached edges 02/07/24 1500   Drainage Amount Small 02/07/24 1500   Drainage Description Serosanguineous 02/07/24 1500   Treatments Cleansed;Provider debridement 02/07/24 1500   Wound Cleansing Hypochlorus Acid 02/07/24 1500   Periwound Protectant Barrier Paste 02/07/24 1500   Dressing Cleansing/Solutions Not Applicable 02/07/24 1500   Dressing Options Hydrofiber Silver;Offloading Dressing - Sacral 02/07/24 1500   Dressing Change/Treatment Frequency Monday, Wednesday, Friday, and As Needed 02/07/24 1500   Wound Team Following Weekly 02/07/24 1500   WOUND NURSE ONLY - Pressure Injury Stage 3 02/07/24 1500   Wound Length (cm) 1.8 cm 02/07/24 1500   Wound Width (cm) 1.2 cm 02/07/24 1500   Wound Depth (cm) 0.5 cm 02/07/24 1500   Wound Surface Area (cm^2) 2.16 cm^2 02/07/24 1500   Wound Volume (cm^3) 1.08 cm^3 02/07/24 1500   Post-Procedure Length (cm) 1.8 cm 02/07/24 1500   Post-Procedure Width (cm) 1.2 cm 02/07/24 1500   Post-Procedure Depth (cm) 0.6 cm 02/07/24 1500   Post-Procedure Surface Area (cm^2) 2.16 cm^2 02/07/24 1500   Post-Procedure Volume (cm^3) 1.296 cm^3 02/07/24 1500   Wound Healing % -56 02/07/24 1500   Tunneling (cm) 0 cm 02/07/24 1500   Undermining (cm) 0 cm 02/07/24 1500   Wound Odor None 02/07/24 1500   Exposed Structures None 02/07/24 1500   Number of days: 9       Wound 01/31/24 Pressure Injury Coccyx Inferior wound, previosly closed but reopened 1/31/24 (Active)   Wound Image   02/07/24 1500   Site Assessment Red;[place] 02/07/24 1500   Periwound Assessment Fragile;Scar tissue 02/07/24 1500   Margins Attached edges 02/07/24 1500   Drainage Amount Scant 02/07/24 1500   Drainage Description Serosanguineous 02/07/24 1500   Treatments Cleansed 02/07/24 1500   Wound Cleansing Hypochlorus Acid 02/07/24 1500   Periwound Protectant Barrier Paste 02/07/24 1500    Dressing Cleansing/Solutions Not Applicable 02/07/24 1500   Dressing Options Hydrofiber Silver;Offloading Dressing - Sacral 02/07/24 1500   Dressing Change/Treatment Frequency Monday, Wednesday, Friday, and As Needed 02/07/24 1500   Wound Team Following Weekly 02/07/24 1500   WOUND NURSE ONLY - Pressure Injury Stage 3 02/07/24 1500   Wound Length (cm) 0.7 cm 02/07/24 1500   Wound Width (cm) 0.5 cm 02/07/24 1500   Wound Depth (cm) 0.5 cm 02/07/24 1500   Wound Surface Area (cm^2) 0.35 cm^2 02/07/24 1500   Wound Volume (cm^3) 0.175 cm^3 02/07/24 1500   Post-Procedure Length (cm) 0.8 cm 01/31/24 1500   Post-Procedure Width (cm) 0.7 cm 01/31/24 1500   Post-Procedure Depth (cm) 0.7 cm 01/31/24 1500   Post-Procedure Surface Area (cm^2) 0.56 cm^2 01/31/24 1500   Post-Procedure Volume (cm^3) 0.392 cm^3 01/31/24 1500   Wound Healing % 49 02/07/24 1500   Tunneling (cm) 0 cm 02/07/24 1500   Undermining (cm) 0 cm 02/07/24 1500   Wound Odor None 02/07/24 1500   Exposed Structures None 02/07/24 1500   Number of days: 9       Wound 02/07/24 Pressure Injury Coccyx Superior wound, new Stage 3 2/7/24 (Active)   Wound Image   02/07/24 1500   Site Assessment Pink;Red 02/07/24 1500   Periwound Assessment Blanchable erythema 02/07/24 1500   Margins Unattached edges 02/07/24 1500   Drainage Amount Small 02/07/24 1500   Drainage Description Serosanguineous 02/07/24 1500   Treatments Cleansed;Provider debridement 02/07/24 1500   Wound Cleansing Hypochlorus Acid 02/07/24 1500   Periwound Protectant Barrier Paste 02/07/24 1500   Dressing Cleansing/Solutions Not Applicable 02/07/24 1500   Dressing Options Hydrofiber Silver;Offloading Dressing - Sacral 02/07/24 1500   Dressing Change/Treatment Frequency Monday, Wednesday, Friday, and As Needed 02/07/24 1500   Wound Team Following Weekly 02/07/24 1500   WOUND NURSE ONLY - Pressure Injury Stage 3 02/07/24 1500   Wound Length (cm) 0.2 cm 02/07/24 1500   Wound Width (cm) 0.4 cm 02/07/24 1500    Wound Depth (cm) 0.2 cm 02/07/24 1500   Wound Surface Area (cm^2) 0.08 cm^2 02/07/24 1500   Wound Volume (cm^3) 0.016 cm^3 02/07/24 1500   Post-Procedure Length (cm) 0.2 cm 02/07/24 1500   Post-Procedure Width (cm) 0.5 cm 02/07/24 1500   Post-Procedure Depth (cm) 0.2 cm 02/07/24 1500   Post-Procedure Surface Area (cm^2) 0.1 cm^2 02/07/24 1500   Post-Procedure Volume (cm^3) 0.02 cm^3 02/07/24 1500   Tunneling (cm) 0 cm 02/07/24 1500   Undermining (cm) 0 cm 02/07/24 1500   Wound Odor None 02/07/24 1500   Exposed Structures None 02/07/24 1500   Number of days: 2       Wound 02/07/24 Full Thickness Wound Pretibial Left (Active)   Wound Image   02/07/24 1500   Site Assessment Red 02/07/24 1500   Periwound Assessment Fragile 02/07/24 1500   Margins Attached edges 02/07/24 1500   Drainage Amount Small 02/07/24 1500   Drainage Description Serosanguineous 02/07/24 1500   Treatments Cleansed;Nonselective debridement 02/07/24 1500   Wound Cleansing Hypochlorus Acid 02/07/24 1500   Periwound Protectant Barrier Paste;Skin Moisturizer 02/07/24 1500   Dressing Cleansing/Solutions Not Applicable 02/07/24 1500   Dressing Options Hydrofiber Silver;Silicone Adhesive Foam;Tubigrip 02/07/24 1500   Dressing Change/Treatment Frequency Monday, Wednesday, Friday, and As Needed 02/07/24 1500   Wound Team Following Weekly 02/07/24 1500   Non-staged Wound Description Full thickness 02/07/24 1500   Wound Length (cm) 0.4 cm 02/07/24 1500   Wound Width (cm) 0.4 cm 02/07/24 1500   Wound Depth (cm) 0.1 cm 02/07/24 1500   Wound Surface Area (cm^2) 0.16 cm^2 02/07/24 1500   Wound Volume (cm^3) 0.016 cm^3 02/07/24 1500   Tunneling (cm) 0 cm 02/07/24 1500   Undermining (cm) 0 cm 02/07/24 1500   Wound Odor None 02/07/24 1500   Exposed Structures None 02/07/24 1500   Number of days: 2       PROCEDURE: Excisional debridement of sacral / coccygeal pressure injury ulcers, L heel and posterior left lower leg  -Curette used to debride wound bed of middle  "and inferior wounds.  No debridement of superior wound.  Excisional debridement was performed to remove devitalized tissue until healthy, bleeding tissue was visualized.  Total wound area debrided 1.3 cm².  Tissue debrided into the subcutaneous layer  -Bleeding controlled with manual pressure.    -Wound care completed by wound RN, refer to flowsheet  -Patient tolerated the procedure well, without c/o pain or discomfort.      PROCEDURE: Excisional debridement of right ischial ulcer  - forceps and scissors used to lift and excise necrotic tissue from base of wound  -Curette used to debride wound bed.  Excisional debridement was performed to remove devitalized tissue until healthy, bleeding tissue was visualized.   Total area debrided approximately 16.0 cm².  Tissue debrided into the muscle/fascia layer.    -Bleeding controlled with manual pressure.    -Wound care completed by wound RN, refer to flowsheet  -Patient tolerated the procedure well, without c/o pain or discomfort.      Pertinent Labs and Diagnostics:    Labs:     A1c: No results found for: \"HBA1C\"     [facility] results, 2022 (under media tab)    CRP 13    ESR 31      IMAGIN2023-CT of abdomen pelvis with contrast  IMPRESSION:   1.  Right ischial decubitus ulcer extending to bone with soft tissue gas tracking along the right perineum. Appearance suggesting osteomyelitis, consider component of necrotizing fasciitis as clinically appropriate.  2.  Small pericardial effusion  3.  Left adrenal nodule, density on prior noncontrast CT demonstrates adenoma.  4.  Hepatomegaly  5.  Enlarged prostate, workup and evaluation for causes of prostate enlargement recommended as clinically appropriate.  6.  Atherosclerosis and atherosclerotic coronary artery disease    12/15/2023-bone scan of left foot  IMPRESSION:     1.  Mild increased activity in the LEFT 1st and 3rd toes on blood pool and delayed images possibly indicating inflammation/infection.  2.  No " significant blood flow asymmetry.          VASCULAR STUDIES: No results found.    LAST  WOUND CULTURE:   Lab Results   Component Value Date/Time    CULTRSULT  12/12/2023 08:04 PM     Culture discontinued due to excessive contamination.    CULTRSULT No fungal growth. 12/12/2023 08:04 PM    CULTRSULT - (A) 12/12/2023 08:04 PM    CULTRSULT Prevotella bivia  Moderate growth   (A) 12/12/2023 08:04 PM    CULTRSULT - (A) 12/12/2023 08:04 PM    CULTRSULT Enterococcus faecalis  Heavy growth   (A) 12/12/2023 08:04 PM    CULTRSULT (A) 12/12/2023 08:04 PM     Proteus mirabilis ESBL  Moderate growth  Extended Spectrum Beta-lactamase (ESBL) isolated.  ESBL's may be clinically resistant to therapy with  Penicillins,Cephalosporins or Aztreonam despite  apparent in vitro susceptibility to some of these agents.  The patient requires contact isolation.  Please contact pharmacy or an Infectious Disease Specialist  if you have any questions about appropriate therapy.        PATHOLOGY  2/17/2023-bone fragment extracted from left lower extremity wound\\  FINAL DIAGNOSIS:     A. Left leg bone fragment at base of chronic wound:          Extensively degenerated fibrocartilaginous tissue with a rim of           fibrinopurulent debris          Correlate with culture findings            Comment: While no residual intact bone is identified, these           findings are suggestive of adjacent osteomyelitis.      ASSESSMENT AND PLAN:     1. Sacral decubitus ulcer, stage IV ([facility])  Comments: Ulcer first noted in early April 2022 as small open area which quickly enlarged.  This ulcer is present distal from previous sacral ulcer which healed after surgery in January.  Patient has history of flap reconstruction x2 to this area.    2/7/2024: Patient returns to clinic after hospitalization and LTAC stay.  2 coccygeal wounds, both of which have decreased significantly in area and depth.  Superior wound nearly resolved   - Excisional debridement of  inferior wound was performed in clinic, medically necessary to promote wound healing.   -Patient to return to clinic weekly for assessment and debridement  -Home health to change dressing 2 times per week   -Patient does spend a lot of time up in his wheelchair, and wishes to continue to do so for his quality of life.  He lives independently, drives, and is involved with family activities.  He does have an appropriate pressure-relief cushion and is very well versed on pressure relieving techniques.  - Known OM that was previously treated.  CT scan done during recent hospitalization did not show OM of sacrum, however OM of the ischium noted.    Wound care: Silver Hydrofiber to manage exudate and bioburden, foam cover dressing, Hypafix tape     2.  Pressure injury of right ischium, stage IV  Comments: Abscess and OM found on CT during hospitalization in December 2023.  Patient underwent I&D with VAC placement.  IV antibiotics through 1/22/2024.    2/7/2024: Previously documented stage III in clinic.  Patient underwent I&D due to abscess and osteomyelitis, depth of wound is now to muscle and bone, and a stage IV.  Patient has completed 6-week course of IV antibiotics.  - Wound larger with increased necrotic tissue and increased odor  -Excisional debridement of nonviable tissue from wound in clinic today, medically necessary to promote wound healing.  - Will order Dakins, Recommend home health wash wound with dakins and pack with dakin moistened gauze for 15 minutes prior to application of VAC. Continue to use puracyn gel.  -Patient to return to clinic weekly for assessment and debridement  -Home health to change dressing 2 times per week in between clinic visits   -Patient is very well versed on pressure relief measures, has adequate surfaces in wheelchair and on his bed.    Wound care: Puracyn gel to base of wound NPWT at 125 mmHg to accelerate granulation, change 3 times per week.     3. Postoperative wound  dehiscence, subsequent encounter  4. Osteomyelitis of left lower extremity  Comments: On 4/26/2022 patient underwent irrigation and debridement of multiple compartments of the left lower extremity states for pressure injury, with bone excision, and complex closure of chronic wound using biologic skin substitute.  During postop visit in surgeons office on 5/11, surgical site was noted to be dehisced.      2/7/2024: Medial leg wound has healed, though covered with fragile epithelium.  Historically this wound has closed and reopened several times.  Possible osteomyelitis of left foot was noted on bone scan during hospitalization.  Ortho was consulted, did not recommend any surgery.  -No excisional debridement required today  -Patient to return to clinic weekly for assessment and debridement as needed  -Monitor site with each clinic visit.  This wound has a history of recurrence.  -Home health to change dressing 2 times per week in between clinic visits OM.  -Patient to be mindful of offloading when supine, should assure that his leg is not rotating medially    Wound care: Open to air    5. Deep tissue injury  6. Pressure injury of left foot, stage IV  Comments: DTI to posterior left lower leg first observed in clinic 7/29/2022.  Patient does not know how it started, had been wearing heel float boot consistently.  Evolved into stage IV pressure injury    2/7/2024: Plantar foot ulcer remains healed, though with discoloration of skin, possible DTI.  This wound has healed and reopened several times.  -No excisional debridement required today.  -Patient to return to clinic weekly for assessment and debridement  -Monitor site each clinic visit     Care: Open to air    7. Pressure injury of left heel, stage 3 ([facility])  Comments: First noted in clinic on 10/21/2022, originally noted to be stage II    2/7/2024: Remains stable  -Excisional debridement of wound in clinic today, medically necessary to promote wound healing.  -Patient  to return to clinic weekly for assessment and debridement  -Patient to change dressing 2 times per week in between clinic visits     Wound care: Silver Hydrofiber to manage exudate and bioburden, foam cover dressing, Hypafix tape     8.  Pressure injury of left leg, stage III    9.  Quadriplegia, C5-C7, incomplete (HCC)  Comments: Complicating factor.  Impaired mobility and sensation  -Patient is still spending 7-8 hours/day up in his wheelchair, though he does have appropriate offloading cushion, and knows to reposition frequently.  Wears heel float boots bilaterally at all times  Wound care: Silver Hydrofiber to manage exudate and bioburden, foam cover dressing, Hypafix tape     Please note that this note may have been created using voice recognition software. I have worked with technical experts from [employer] to optimize the interface.  I have made every reasonable attempt to correct obvious errors, but there may be errors of grammar and possibly content that I did not discover before finalizing the note.

## 2024-02-08 NOTE — PROGRESS NOTES
Updated Home wound care order routed to [facility].      Pt was given paper prescription for Dakins solution from provider during visit.

## 2024-02-08 NOTE — PATIENT INSTRUCTIONS
-Keep your wound dressing clean, dry, and intact.    -Change your dressing if it becomes soiled, soaked, or falls off.    -Wound vac may not have any drainage in tube or cannister & it will still be working.   Change cannister if it does become full by pressing tab on side of machine to remove canister and snap on new one. Full canister can be thrown in the trash. If cannister fills with bright red blood - go to ER. Dressing will be changed every MWF at the wound clini.  If you are having issues with your wound VAC, please consider patching leaks, changing the canister, or calling [phone] for troubleshooting. If the wound VAC has been off or un-operational for over 2 hours, call wound care center to inform them and remove all dressings including black foam and replace with normal saline damp gauze.     -Should you experience any significant changes in your wound(s), such as infection (redness, swelling, localized heat, increased pain, fever > 101 F, chills) or have any questions regarding your home care instructions, please contact the wound center at [phone]. If after hours, contact your primary care physician or go to the hospital emergency room.

## 2024-02-15 ENCOUNTER — OFFICE VISIT (OUTPATIENT)
Dept: CARDIOLOGY | Facility: MEDICAL CENTER | Age: 74
End: 2024-02-15
Attending: NURSE PRACTITIONER
Payer: MEDICARE

## 2024-02-15 VITALS
SYSTOLIC BLOOD PRESSURE: 94 MMHG | HEART RATE: 80 BPM | RESPIRATION RATE: 18 BRPM | OXYGEN SATURATION: 97 % | DIASTOLIC BLOOD PRESSURE: 58 MMHG

## 2024-02-15 DIAGNOSIS — Z95.818 PRESENCE OF WATCHMAN LEFT ATRIAL APPENDAGE CLOSURE DEVICE: ICD-10-CM

## 2024-02-15 DIAGNOSIS — I48.4 ATYPICAL ATRIAL FLUTTER (HCC): ICD-10-CM

## 2024-02-15 DIAGNOSIS — I48.0 PAROXYSMAL ATRIAL FIBRILLATION (HCC): ICD-10-CM

## 2024-02-15 LAB — EKG IMPRESSION: NORMAL

## 2024-02-15 PROCEDURE — 99213 OFFICE O/P EST LOW 20 MIN: CPT | Performed by: NURSE PRACTITIONER

## 2024-02-15 PROCEDURE — 3074F SYST BP LT 130 MM HG: CPT | Performed by: NURSE PRACTITIONER

## 2024-02-15 PROCEDURE — 93005 ELECTROCARDIOGRAM TRACING: CPT | Performed by: NURSE PRACTITIONER

## 2024-02-15 PROCEDURE — 93010 ELECTROCARDIOGRAM REPORT: CPT | Performed by: INTERNAL MEDICINE

## 2024-02-15 PROCEDURE — 3078F DIAST BP <80 MM HG: CPT | Performed by: NURSE PRACTITIONER

## 2024-02-15 PROCEDURE — 93285 PRGRMG DEV EVAL SCRMS IP: CPT | Mod: 26 | Performed by: NURSE PRACTITIONER

## 2024-02-15 PROCEDURE — 99213 OFFICE O/P EST LOW 20 MIN: CPT | Mod: 25 | Performed by: NURSE PRACTITIONER

## 2024-02-15 PROCEDURE — 93285 PRGRMG DEV EVAL SCRMS IP: CPT | Performed by: NURSE PRACTITIONER

## 2024-02-15 ASSESSMENT — ENCOUNTER SYMPTOMS
DEPRESSION: 0
RESPIRATORY NEGATIVE: 1
SENSORY CHANGE: 0
PND: 0
NERVOUS/ANXIOUS: 0
NAUSEA: 0
WHEEZING: 0
EYES NEGATIVE: 1
GASTROINTESTINAL NEGATIVE: 1
SHORTNESS OF BREATH: 0
DIZZINESS: 0
CONSTITUTIONAL NEGATIVE: 1
BRUISES/BLEEDS EASILY: 0
MUSCULOSKELETAL NEGATIVE: 1
ORTHOPNEA: 0
CLAUDICATION: 0
HALLUCINATIONS: 0
NEUROLOGICAL NEGATIVE: 1
PALPITATIONS: 0

## 2024-02-16 ENCOUNTER — OFFICE VISIT (OUTPATIENT)
Dept: WOUND CARE | Facility: MEDICAL CENTER | Age: 74
End: 2024-02-16
Attending: NURSE PRACTITIONER
Payer: MEDICARE

## 2024-02-16 VITALS
HEART RATE: 97 BPM | TEMPERATURE: 98.3 F | RESPIRATION RATE: 16 BRPM | SYSTOLIC BLOOD PRESSURE: 112 MMHG | OXYGEN SATURATION: 96 % | DIASTOLIC BLOOD PRESSURE: 72 MMHG

## 2024-02-16 DIAGNOSIS — L89.623 PRESSURE INJURY OF LEFT HEEL, STAGE 3 (HCC): ICD-10-CM

## 2024-02-16 DIAGNOSIS — M86.9 OSTEOMYELITIS OF LEFT LOWER EXTREMITY (HCC): ICD-10-CM

## 2024-02-16 DIAGNOSIS — L89.894 PRESSURE INJURY OF LEFT FOOT, STAGE 4 (HCC): ICD-10-CM

## 2024-02-16 DIAGNOSIS — L89.314 PRESSURE INJURY OF RIGHT ISCHIUM, STAGE 4 (HCC): Primary | ICD-10-CM

## 2024-02-16 DIAGNOSIS — L89.893 PRESSURE INJURY OF LEFT LEG, STAGE 3 (HCC): ICD-10-CM

## 2024-02-16 DIAGNOSIS — L89.154 SACRAL DECUBITUS ULCER, STAGE IV (HCC): ICD-10-CM

## 2024-02-16 DIAGNOSIS — T14.8XXA DEEP TISSUE INJURY: ICD-10-CM

## 2024-02-16 DIAGNOSIS — T81.31XD POSTOPERATIVE WOUND DEHISCENCE, SUBSEQUENT ENCOUNTER: ICD-10-CM

## 2024-02-16 DIAGNOSIS — G82.54 QUADRIPLEGIA, C5-C7 INCOMPLETE (HCC): ICD-10-CM

## 2024-02-16 PROCEDURE — 11044 DBRDMT BONE 1ST 20 SQ CM/<: CPT | Performed by: NURSE PRACTITIONER

## 2024-02-16 PROCEDURE — 3074F SYST BP LT 130 MM HG: CPT | Performed by: NURSE PRACTITIONER

## 2024-02-16 PROCEDURE — 11043 DBRDMT MUSC&/FSCA 1ST 20/<: CPT

## 2024-02-16 PROCEDURE — 11042 DBRDMT SUBQ TIS 1ST 20SQCM/<: CPT | Mod: 59 | Performed by: NURSE PRACTITIONER

## 2024-02-16 PROCEDURE — 97605 NEG PRS WND THER DME<=50SQCM: CPT

## 2024-02-16 PROCEDURE — 11042 DBRDMT SUBQ TIS 1ST 20SQCM/<: CPT

## 2024-02-16 PROCEDURE — 3078F DIAST BP <80 MM HG: CPT | Performed by: NURSE PRACTITIONER

## 2024-02-16 PROCEDURE — 11043 DBRDMT MUSC&/FSCA 1ST 20/<: CPT | Mod: 59 | Performed by: NURSE PRACTITIONER

## 2024-02-16 PROCEDURE — 11044 DBRDMT BONE 1ST 20 SQ CM/<: CPT

## 2024-02-16 NOTE — PROGRESS NOTES
"Electrophysiology Follow up  Note    DOS: 2/15/2024  [id number]  Osvaldo Pate    Chief complaint/Reason for consult: ILR monitoring for AF/AFL    HPI: Pt is a 73 y.o. male who presents to the clinic today in follow up for PAF. Patient has a past medical history significant for but not limited to: hypertension, quadriplegia incomplete, paroxysmal atrial fibrillation/flutter, Watchman in situ, ILR in situ. Patient is here for annual follow up. ILR interrogation shows frequent short stacie AT episodes, all asymptomatic with overall burden 0.6%. No cardiac complaints form patient. Continue annual follow up.       Past Medical History:   Diagnosis Date    A-fib ([facility])     Acute cystitis without hematuria 10/23/2021    Acute UTI 06/18/2023    Arrhythmia     Atrial flutter ([facility])     Blood clotting disorder ([facility])     Patient is on Xarelto    Bowel habit changes     Colostomy    Clot hematuria 01/23/2023    GERD (gastroesophageal reflux disease)     Hypertension     Hypokalemia 06/18/2023    Kidney stones     Metabolic acidosis 06/18/2023    Neurogenic bladder     S/P cath    Open wound 11/18/2022    sacrum with wound vac, left ankle, [facility]    Quadriplegia, C5-C7 complete ([facility])     patient reports \" incomplete quad\"    Sepsis secondary to UTI ([facility]) 06/17/2023    SIRS (systemic inflammatory response syndrome) ([facility]) 12/12/2023    Suprapubic catheter ([facility])        Past Surgical History:   Procedure Laterality Date    IRRIGATION & DEBRIDEMENT GENERAL Right 12/12/2023    Procedure: IRRIGATION AND DEBRIDEMENT, WOUND/BUTTOCKS;  Surgeon: Huan Bansal M.D.;  Location: SURGERY [facility];  Service: General    IRRIGATION & DEBRIDEMENT GENERAL Left 04/26/2022    Procedure: IRRIGATION AND DEBRIDEMENT, WOUND - LEG;  Surgeon: Eric Raman M.D.;  Location: SURGERY [facility];  Service: Orthopedics    WOUND CLOSURE NEURO Left 04/26/2022    Procedure: CLOSURE, WOUND;  Surgeon: Eric Raman M.D.;  Location: " [facility];  Service: Orthopedics    ORTHOPEDIC OSTEOTOMY Left 04/26/2022    Procedure: OSTECTOMY;  Surgeon: Eric Raman M.D.;  Location: [facility];  Service: Orthopedics    INCISION AND DRAINAGE ORTHOPEDIC Left 01/27/2022    Procedure: INCISION AND DRAINAGE, WOUND, BY ORTHOPEDICS;  Surgeon: Erasmo Stewart M.D.;  Location: [facility];  Service: Orthopedics    BONE BIOPSY Left 01/27/2022    Procedure: BIOPSY, BONE;  Surgeon: Erasmo Stewart M.D.;  Location: [facility];  Service: Orthopedics    INCISION AND DRAINAGE ORTHOPEDIC  01/22/2022    Procedure: INCISION AND DRAINAGE, WOUND, BY ORTHOPEDICS;  Surgeon: Erasmo Stewart M.D.;  Location: [facility];  Service: Orthopedics    OR CYSTOSCOPY,INSERT URETERAL STENT Left 01/04/2022    Procedure: CYSTOSCOPY, WITH URETERAL STENT INSERTION;  Surgeon: Osvaldo Baltazar M.D.;  Location: [facility];  Service: Urology    OR CYSTO/URETERO/PYELOSCOPY, DX Left 01/04/2022    Procedure: URETEROSCOPY;  Surgeon: Osvaldo Baltazar M.D.;  Location: [facility];  Service: Urology    LASERTRIPSY N/A 01/04/2022    Procedure: LITHOTRIPSY, USING LASER;  Surgeon: Osvaldo Baltazar M.D.;  Location: [facility];  Service: Urology    OR CYSTOSCOPY,INSERT URETERAL STENT Left 12/16/2021    Procedure: CYSTOSCOPY, WITH URETERAL STENT INSERTION;  Surgeon: Aly Bowen M.D.;  Location: [facility];  Service: Urology    OR CYSTO/URETERO/PYELOSCOPY, DX Left 12/16/2021    Procedure: URETEROSCOPY;  Surgeon: Aly Bowen M.D.;  Location: [facility];  Service: Urology    LASERTRIPSY Left 12/16/2021    Procedure: LITHOTRIPSY, USING LASER;  Surgeon: Aly Bowen M.D.;  Location: [facility];  Service: Urology    IRRIGATION & DEBRIDEMENT GENERAL  12/20/2020    Procedure: IRRIGATION AND DEBRIDEMENT, WOUND SACRAL ULCER;  Surgeon: Matt Cummins M.D.;  Location: SURGERY [facility];  Service: Plastics    ULCER DEBRIDEMENT N/A 2019    Procedure: debridement of Sacral grade 4 ulcer - W/BONE BIOPSY, 3 liter wash out. bilateral sliding gluteal myocutaneous flap advancement;  Surgeon: Amadeo Moon M.D.;  Location: SURGERY [facility];  Service: Plastics    FLAP CLOSURE  2019    Procedure: CLOSURE, FLAP - MUSCLE;  Surgeon: Amadeo Moon M.D.;  Location: SURGERY [facility];  Service: Plastics    COLOSTOMY N/A 2019    Procedure: CREATION, COLOSTOMY -  placement;  Surgeon: Elías Hannah M.D.;  Location: SURGERY [facility];  Service: General    COLOSTOMY      COLOSTOMY TAKEDOWN      HERNIA REPAIR      ORIF, ANKLE      PERCUTANEOUOSPINNING LOWER EXTREMITY         Social History     Socioeconomic History    Marital status:      Spouse name: Not on file    Number of children: Not on file    Years of education: Not on file    Highest education level: Not on file   Occupational History    Not on file   Tobacco Use    Smoking status: Former     Current packs/day: 0.00     Average packs/day: 1 pack/day for 10.0 years (10.0 ttl pk-yrs)     Types: Cigarettes     Start date: 1967     Quit date: 1977     Years since quittin.1    Smokeless tobacco: Never   Vaping Use    Vaping Use: Never used   Substance and Sexual Activity    Alcohol use: Yes     Alcohol/week: 4.2 oz     Types: 7 Standard drinks or equivalent per week     Comment: 2/day    Drug use: No    Sexual activity: Not on file   Other Topics Concern    Not on file   Social History Narrative    Not on file     Social Determinants of Health     Financial Resource Strain: Not on file   Food Insecurity: No Food Insecurity (2019)    Hunger Vital Sign     Worried About Running Out of Food in the Last Year: Never true     Ran Out of Food in the Last Year: Never true   Transportation Needs: No Transportation Needs (2019)    PRAPARE - Transportation     Lack of Transportation  "(Medical): No     Lack of Transportation (Non-Medical): No   Physical Activity: Not on file   Stress: Not on file   Social Connections: Not on file   Intimate Partner Violence: Not on file   Housing Stability: Not on file       Family History   Problem Relation Age of Onset    Heart Disease Father        Allergies   Allergen Reactions    Sulfa Drugs Rash     Rash, developed this back in 2015 after being placed on \"sulfa antibiotic for my wound\". Antibiotic was stopped and rash went away. Patient states he had a sulfa antibiotic prior to that time back when he was younger w/o a reaction.          Current Outpatient Medications   Medication Sig Dispense Refill    Sodium Hypochlorite (DAKINS 0.125%, 1/4 STRENGTH,) 0.125 % Solution Wash ischial pressure injury with Dakins, moisten gauze and pack with wound for 10 to 15 minutes prior to applying wound VAC dressing. Use Dakins 2x weekly with dressing changes. 473 mL 0    docusate sodium (COLACE) 100 MG Cap Take 2 Capsules by mouth 2 times a day.      amLODIPine (NORVASC) 5 MG Tab Take 5 mg by mouth as needed (Per MAR if blood pressure if less than 130/80).      losartan (COZAAR) 50 MG Tab TAKE 1 TABLET BY MOUTH AT  BEDTIME. HOLD IF BP &lt;100/64. (Patient taking differently: Take 50 mg by mouth every evening.) 90 Tablet 3    methenamine hip (HIPPREX) 1 GM Tab Take 1 g by mouth 2 times a day.      Simethicone (GAS-X PO) Take 1 Tablet by mouth 2 times a day as needed (For gas).      acetaminophen (TYLENOL) 500 MG Tab Take 1,000 mg by mouth every 6 hours as needed for Moderate Pain.      baclofen (LIORESAL) 20 MG tablet Take 1 Tablet by mouth 4 times a day. (Patient taking differently: Take 20 mg by mouth 4 times a day. Per MAR pt takes @ 0800, 1200, 1600, and 2000) 360 Tablet 3    diclofenac sodium (VOLTAREN) 1 % Gel Apply 1 g topically 4 times a day. Apply to both elbows 350 g 11    polyethylene glycol/lytes (MIRALAX) 17 g Pack Take 17 g by mouth every day. Indications: " Constipation      aspirin EC 81 MG EC tablet Take 1 Tablet by mouth every day. 30 Tablet 6    Zinc 50 MG Tab Take 1 Tablet by mouth every morning.      Cholecalciferol (VITAMIN D3) 2000 UNIT Cap Take 1 Capsule by mouth every morning.      Magnesium 400 MG Tab Take 400 mg by mouth every morning.      Ascorbic Acid (VITAMIN C) 1000 MG Tab Take 1 Tablet by mouth every morning.      melatonin 5 mg Tab Take 10 mg by mouth at bedtime. Gummie      Probiotic Product (PROBIOTIC PO) Take 1 Capsule by mouth every morning.      sennosides (SENOKOT) 8.6 MG Tab Take 17.2 mg by mouth 2 times a day.      ferrous sulfate 325 (65 Fe) MG tablet Take 1 Tablet by mouth 2 times a day.       No current facility-administered medications for this visit.       Vitals:    02/15/24 1511   BP: 94/58   Pulse: 80   Resp: 18   SpO2: 97%         Review of Systems   Constitutional: Negative.  Negative for malaise/fatigue.   HENT: Negative.     Eyes: Negative.    Respiratory: Negative.  Negative for shortness of breath and wheezing.    Cardiovascular:  Negative for chest pain, palpitations, orthopnea, claudication, leg swelling and PND.   Gastrointestinal: Negative.  Negative for nausea.   Genitourinary: Negative.    Musculoskeletal: Negative.    Skin: Negative.    Neurological: Negative.  Negative for dizziness and sensory change.   Endo/Heme/Allergies: Negative.  Does not bruise/bleed easily.   Psychiatric/Behavioral:  Negative for depression and hallucinations. The patient is not nervous/anxious.           Device Type: [employer] ILR  Battery Longevity: good  Lead Impedance: stable and within normal limits  Indication: AF management  Events: frequent AT salvos    Device interrogated and programmed by Huan Cook       EKG interpreted by me: Sinus RBBB    Physical Exam  Constitutional:       Appearance: Normal appearance.   HENT:      Head: Normocephalic.   Eyes:      Pupils: Pupils are equal, round, and reactive to light.   Neck:       "Vascular: No JVD.   Cardiovascular:      Rate and Rhythm: Normal rate and regular rhythm.      Pulses: Normal pulses.      Heart sounds: Normal heart sounds.   Pulmonary:      Effort: Pulmonary effort is normal.      Breath sounds: Normal breath sounds.   Abdominal:      General: Abdomen is flat.      Palpations: Abdomen is soft.   Musculoskeletal:      Cervical back: Normal range of motion.      Right lower leg: No edema.      Left lower leg: No edema.      Comments: Patient incomplete quadriplegic in wheelchair    Skin:     General: Skin is warm and dry.   Neurological:      Mental Status: He is alert and oriented to person, place, and time.   Psychiatric:         Mood and Affect: Mood normal.         Behavior: Behavior normal.          Data:  Lipids:   No results found for: \"CHOLSTRLTOT\", \"TRIGLYCERIDE\", \"HDL\", \"LDL\"     BMP:  Lab Results   Component Value Date/Time    SODIUM 142 01/22/2024 0647    POTASSIUM 3.6 01/22/2024 0647    CHLORIDE 113 (H) 01/22/2024 0647    CO2 25.0 01/22/2024 0647    GLUCOSE 85 01/22/2024 0647    BUN 18.0 01/22/2024 0647    CREATININE 0.40 (L) 01/22/2024 0647    CALCIUM 8.0 (L) 01/22/2024 0647    ANION 4.0 01/22/2024 0647       GFR:  Lab Results   Component Value Date/Time    IFAFRICA >60 01/25/2022 0459    IFNOTAFR >60 01/25/2022 0459        TSH:   Lab Results   Component Value Date/Time    TSHULTRASEN 0.350 (L) 12/09/2019 1129       MAGNESIUM:  Lab Results   Component Value Date/Time    MAGNESIUM 2.2 12/17/2023 0235    MAGNESIUM 2.0 06/19/2023 0156    MAGNESIUM 2.2 03/02/2023 0054        THYROXINE (T4):   No results found for: \"FREEDIR\"     CBC:   Lab Results   Component Value Date/Time    WBC 5.40 01/22/2024 06:47 AM    WBC 5.2 12/20/2023 03:45 AM    RBC 3.29 (L) 01/22/2024 06:47 AM    RBC 3.57 (L) 12/20/2023 03:45 AM    HEMOGLOBIN 10.8 (L) 01/22/2024 06:47 AM    HEMOGLOBIN 11.9 (L) 12/20/2023 03:45 AM    HEMATOCRIT 32.4 (L) 01/22/2024 06:47 AM    HEMATOCRIT 36.5 (L) 12/20/2023 03:45 " AM    MCV 98.3 (H) 01/22/2024 06:47 AM    .2 (H) 12/20/2023 03:45 AM    MCH 32.8 01/22/2024 06:47 AM    MCH 33.3 (H) 12/20/2023 03:45 AM    MCHC 33.3 (L) 01/22/2024 06:47 AM    MCHC 32.6 12/20/2023 03:45 AM    RDW 15.2 01/22/2024 06:47 AM    RDW 50.4 (H) 12/20/2023 03:45 AM    PLATELETCT 185 01/22/2024 06:47 AM    PLATELETCT 187 12/20/2023 03:45 AM    MPV 6.5 (L) 01/22/2024 06:47 AM    MPV 9.0 12/20/2023 03:45 AM    NEUTSPOLYS 54.9 01/22/2024 06:47 AM    NEUTSPOLYS 57.20 12/20/2023 03:45 AM    LYMPHOCYTES 24.7 01/22/2024 06:47 AM    LYMPHOCYTES 24.70 12/20/2023 03:45 AM    MONOCYTES 11.9 (H) 01/22/2024 06:47 AM    MONOCYTES 11.20 12/20/2023 03:45 AM    EOSINOPHILS 7.8 (H) 01/22/2024 06:47 AM    EOSINOPHILS 5.00 12/20/2023 03:45 AM    BASOPHILS 0.7 01/22/2024 06:47 AM    BASOPHILS 0.60 12/20/2023 03:45 AM    IMMGRAN 1.30 (H) 12/20/2023 03:45 AM    NRBC 0.00 12/20/2023 03:45 AM    NEUTS 3.0 01/22/2024 06:47 AM    NEUTS 2.97 12/20/2023 03:45 AM    LYMPHS 1.3 01/22/2024 06:47 AM    LYMPHS 1.28 12/20/2023 03:45 AM    LYMPHS 2 12/08/2019 05:35 PM    MONOS 0.6 01/22/2024 06:47 AM    MONOS 0.58 12/20/2023 03:45 AM    EOS 0.4 01/22/2024 06:47 AM    EOS 0.26 12/20/2023 03:45 AM    BASO 0.0 01/22/2024 06:47 AM    BASO 0.03 12/20/2023 03:45 AM    IMMGRANAB 0.07 12/20/2023 03:45 AM    NRBCAB 0.00 12/20/2023 03:45 AM        CBC w/o DIFF  Lab Results   Component Value Date/Time    WBC 5.40 01/22/2024 06:47 AM    WBC 5.2 12/20/2023 03:45 AM    RBC 3.29 (L) 01/22/2024 06:47 AM    RBC 3.57 (L) 12/20/2023 03:45 AM    HEMOGLOBIN 10.8 (L) 01/22/2024 06:47 AM    HEMOGLOBIN 11.9 (L) 12/20/2023 03:45 AM    MCV 98.3 (H) 01/22/2024 06:47 AM    .2 (H) 12/20/2023 03:45 AM    MCH 32.8 01/22/2024 06:47 AM    MCH 33.3 (H) 12/20/2023 03:45 AM    MCHC 33.3 (L) 01/22/2024 06:47 AM    MCHC 32.6 12/20/2023 03:45 AM    RDW 15.2 01/22/2024 06:47 AM    RDW 50.4 (H) 12/20/2023 03:45 AM    MPV 6.5 (L) 01/22/2024 06:47 AM    MPV 9.0 12/20/2023  "03:45 AM       LIVER:  Lab Results   Component Value Date/Time    ALKPHOSPHAT 62 01/15/2024 10:40 AM    ALKPHOSPHAT 64 12/20/2023 03:45 AM    ASTSGOT 14 01/15/2024 10:40 AM    ASTSGOT 16 12/20/2023 03:45 AM    ALTSGPT 19 01/15/2024 10:40 AM    ALTSGPT 20 12/20/2023 03:45 AM    TBILIRUBIN 0.5 01/15/2024 10:40 AM    TBILIRUBIN 0.3 12/20/2023 03:45 AM       BNP:  No results found for: \"BNPBTYPENAT\"    PT/INR:  Lab Results   Component Value Date/Time    PROTHROMBTM 15.5 (H) 06/17/2023 09:15 PM    PROTHROMBTM 18.7 (H) 11/18/2022 11:58 AM    PROTHROMBTM 14.5 04/24/2022 04:27 AM    INR 1.25 (H) 06/17/2023 09:15 PM    INR 1.59 (H) 11/18/2022 11:58 AM    INR 1.16 (H) 04/24/2022 04:27 AM             Impression/Plan:  Presence of Watchman left atrial appendage closure device- implanted 11/22/22 Dr Merino    - continue daily aspirin 81mg     Atrial fibrillation (HCC)  No recurrence per ILR   - Watchman for stroke protection   - annual follow up visit              A total of 25 minutes of time was spent on day of encounter reviewing medical record, performing history and examination, counseling, ordering medication/test/consults, collaborating with referring service, and documentation.    Huan Cook [facility]-EP  Cardiac Electrophysiology    "

## 2024-02-16 NOTE — PROGRESS NOTES
Provider Encounter- Pressure Injury        HISTORY OF PRESENT ILLNESS  Wound History:    START OF CARE IN CLINIC: 1/31/2024 (return to clinic after hospitalization)    REFERRING PROVIDER: Racquel York       WOUNDS-superior coccyx pressure injury, stage IV                                 Coccyx pressure injury, stage IV           Inferior coccyx pressure injury, stage IV                                 Right ischial pressure injury, stage IV                                 Left heel pressure injury, recurring stage III                                 Left posterior lower leg/calf-stage III                                 Left medial lower leg surgical wound dehiscence-resolved, reopens frequently            HISTORY: Patient with history of incomplete quadriplegia referred to [facility] for treatment of a stage IV pressure injury.  He has a history of previous pressure injuries to this area, and underwent muscle flaps in 2019, and then again in 2020.  He was seen in the wound clinic in November 2021 for an ulcer proximal from his current ulcer, and pressure injuries to his left posterior lower leg and left heel.  At that time, it was discovered that the patient had retained VAC foam embedded in the wound bed of the sacral wound.  Attempts were made to get him back to his plastic surgeon, though unsuccessful.  In January he underwent surgical removal of VAC sponge along with excisional debridement of his sacral wound by Dr. Chaves.  After the surgery, his wound went on to heal without incident.   In early April 2022, his home health nurse noted a new sacral ulcer, below the previous ulcer which quickly tripled in size over the following weeks.  The ulcer to his left medial lower leg had also deteriorated, with bone visible at the base..  He was hospitalized from 4/22 until 4/27/2022 and underwent surgery with Dr. Raman on 4/26 for irrigation and debridement of multiple compartments of the left lower extremity, bone  excision, and complex closure of chronic wound using biologic skin substitute.   His sacrococcygeal wound was not surgically addressed during this admission.  He was discharged back to his group home, with home health, and referral to outpatient wound clinic for his sacral wound.  He was instructed to follow-up with his surgeon for his lower leg wound.       Postoperatively, the left medial lower leg incision dehisced.  He was seen by his surgeon at [facility] on 5/11.  The surgeon opted to leave remaining sutures in place, and refer him to the wound clinic for treatment of this wound.   Treatment of this wound was initiated in clinic on 5/12.  During this visit was also noted that his heel DTI had resolved, but that he had a new pressure injury to his left posterior lower extremity.     A new pressure injury was noted to patient's right upper buttock/lower back on 5/20/2022.  Wound was linear in shape, skin discolored but intact.     Abrasion noted to left anterior lower leg.  First observed in clinic on 7/22/2022.  Patient states he bumped his leg into his food tray.     Small DTI noted to patient's left lateral lower leg on 7/29/2022.  Skin intact but discolored.     Large area of deep tissue injury noted to patient's left exterior lower leg.  Patient denied any trauma to this area.  Skin intact.  Wound documented.    1/27/2023: Patient was admitted to [facility] from 1/23-1/25/2023 with gross hematuria. He underwent RICHARD which showed watchman device was in place and he was taken off of Xarelto. While hospitalized wound team was consulted. He was referred back to [facility] and home health upon discharge.    Patient was hospitalized at [facility] for pyelonephritis from 2/26 until 3/2/2023, admitted for fever and general malaise.  He was admitted and initially started on linezolid and meropenem for suspected UTI and history of multidrug-resistant organisms.  Urine cultures were negative. ID was consulted, recommended CT of chest and  abdomen,which were negative for acute findings. However, he was treated with 5 days total course of antibiotics for suspected UTI, and symptoms completely resolved.  During this admission, the inpatient wound team was consulted for treatment of his sacral and lower leg wounds.  A wound culture was taken from his left heel pressure ulcer, negative.  Once stabilized, he was discharged home and referred back to [facility] to resume treatment of his wounds.    Patient was hospitalized at [facility] from 12/11 until 12/23/2023, admitted for fever.  Wound infection suspected.  CT scan of abdomen and pelvis for evaluation of sacral pressure injury showed gas tracking down to the bone consistent with osteomyelitis.  He underwent I&D of right ischial ulcer (documented as buttock) with Dr. Bansal, medial tract leading to an abscess was identified.  Cavity was opened allowing it to drain into the main wound bed.  Wound VAC was placed and managed by wound team during this admission.  A bone scan of patient's left foot was also done, initially concerning for osteomyelitis.  Orthopedic surgery was consulted and did not recommend surgical intervention. ID consulted also, recommended the patient to receive IV ertapenem 1 g every 24 hours plus IV daptomycin 8 mg/kg every 24 hours through 1/22/2024.   He was discharged to LTAC on 1/23 for IV antibiotics and wound care.  From the LTAC he was discharged home on 1/22 with home health and referral back to [facility] to resume management of his wounds.      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  Never used smokeless tobacco    Patient's problem list, allergies, and current  medications reviewed and updated in Epic    Interval History:  Interval History thinned 7/29/2022.  Please see previous notes for complete interval history.   Interval History thinned 1/27/2023. Please see previous note for complete interval history.  Interval History thinned 3/3/2023.  Please see previous notes for complete interval history.    Interval History thinned 8/4/2023.  Please see previous notes for complete interval history.    Interval History thinned 1/31/2024.  Please see previous notes for complete interval history.      1/31/2024 Initial clinic visit with ADILSON Arthur, HOLDEN, ALEXN, CFTRACI.   Patient returns to clinic after hospitalization and LTAC stay.  VAC in place to right ischial wound.  Sacral wounds have progressed significantly since he was last seen in clinic.  Left medial wound has healed, though covered with friable tissue.  Left heel ulcer, previously resolved has reopened slightly.  He states that he is feeling well overall, denies fevers, chills, nausea, vomiting, cough or shortness of breath.     2/7/2024: Clinic visit with Steve Hodges MD. Patient reports feeling in normal state of health. Patient denies any alarms from wound VAC, however today he presented with significant odor from ischial wound and dressing was partially avulsed leaving in adequate suction. Machine was checked and functioning well, there were no recorded alarms.    2/16/24: Clinic visit with Dana DE ANDA, HOLDEN, ABBY, CFTRACI.  Pt denies fevers, chills, nausea, vomiting.  Left shin fluctuance to wound with hematoma and dark sanguinous drainage.  Bone fragment removed during debridement.  Obtain x-ray as residual bone palpated.  [facility]-ray ordered through [facility].  Coccyx wound has decreased from 3 ulcers to now 1.  Right ischial wound with slough, odor.  Dakin soaked performed during visit.  Wound debrided.  Able to continue VAC postdebridement.       REVIEW OF SYSTEMS:   Unchanged from  previous wound clinic assessment on 2/7/2024, except as noted in interval history above        PHYSICAL EXAMINATION:   /72   Pulse 97   Temp 36.8 °C (98.3 °F) (Temporal)   Resp 16   SpO2 96%   Physical Exam  Constitutional:       Appearance: He is obese.   Cardiovascular:      Rate and Rhythm: Normal rate.   Pulmonary:      Effort: Pulmonary effort is normal.   Abdominal:      Comments: Colostomy left lower quadrant   Genitourinary:     Comments: Suprapubic catheter to down drain   Skin:     Comments: Stage IV coccygeal pressure injury - previously 3 wounds, decreased to 1 wound at distal aspect.  Moderate maceration, minimal senescent tissue.      Stage IV pressure injury to right ischium- Wound largely unchanged, bone remains palpable in wound bed.  Odor with slough.  No periwound infection noted.    Full-thickness wound to left medial lower leg, surgical wound dehiscence-fluctuance, hematoma noted underneath with dark sanguinous drainage, bone fragment palpable.  History of previous recurrence    Stage III pressure injury left posterior heel -thin scab intact    Stage IV pressure injury plantar foot -resolved    Stage III pressure injury to posterior left lower leg-resolved    Left shin: Resolved     Neurological:      Mental Status: He is alert and oriented to person, place, and time.   Psychiatric:         Mood and Affect: Mood normal.         WOUND ASSESSMENT  Wound 06/18/23 Pressure Injury Leg Posterior Left resolved 9/1/23, re-opened 11/17/23 (Active)   Wound Image   02/16/24 1620   Site Assessment Scabbed 02/16/24 1620   Periwound Assessment Blanchable erythema 02/16/24 1620   Margins Defined edges 02/16/24 1620   Closure Secondary intention 12/06/23 1600   Drainage Amount Scant 02/16/24 1620   Drainage Description Serous 02/16/24 1620   Treatments Cleansed;Site care 02/16/24 1620   Wound Cleansing Normal Saline Irrigation 02/16/24 1620   Periwound Protectant Skin Moisturizer 02/16/24 1620    Dressing Changed New 02/07/24 1500   Dressing Cleansing/Solutions Not Applicable 02/16/24 1620   Dressing Options Silicone Adhesive Foam;Tubigrip 02/16/24 1620   Dressing Change/Treatment Frequency Monday, Wednesday, Friday, and As Needed 02/16/24 1620   Wound Team Following Weekly 02/16/24 1620   WOUND NURSE ONLY - Pressure Injury Stage 3 02/16/24 1620   Non-staged Wound Description Not applicable 01/31/24 1500   Wound Length (cm) 0.7 cm 02/07/24 1500   Wound Width (cm) 1.2 cm 02/07/24 1500   Wound Depth (cm) 0.1 cm 02/07/24 1500   Wound Surface Area (cm^2) 0.84 cm^2 02/07/24 1500   Wound Volume (cm^3) 0.084 cm^3 02/07/24 1500   Post-Procedure Length (cm) 0.8 cm 02/07/24 1500   Post-Procedure Width (cm) 1.3 cm 02/07/24 1500   Post-Procedure Depth (cm) 0.1 cm 02/07/24 1500   Post-Procedure Surface Area (cm^2) 1.04 cm^2 02/07/24 1500   Post-Procedure Volume (cm^3) 0.104 cm^3 02/07/24 1500   Wound Healing % 92 02/07/24 1500   Tunneling (cm) 0 cm 02/16/24 1620   Undermining (cm) 0 cm 02/16/24 1620   Wound Odor None 02/16/24 1620   Exposed Structures None 02/16/24 1620   Number of days: 244       Wound 12/12/23 Pressure Injury Ischium Right (Active)   Wound Image    02/16/24 1620   Site Assessment Red;Yellow;Tan 02/16/24 1620   Periwound Assessment Fragile 02/16/24 1620   Margins Undefined edges 02/16/24 1620   Drainage Amount Large 02/16/24 1620   Drainage Description Serosanguineous 02/16/24 1620   Treatments Cleansed;Provider debridement 02/16/24 1620   Wound Cleansing Dakin's Solution 02/16/24 1620   Periwound Protectant Skin Protectant Wipes to Periwound;Drape 02/16/24 1620   Dressing Changed Changed 02/07/24 1500   Dressing Cleansing/Solutions Not Applicable 02/16/24 1620   Dressing Options Wound Vac;Offloading Dressing - Sacral 02/16/24 1620   Dressing Change/Treatment Frequency Monday, Wednesday, Friday, and As Needed 02/16/24 1620   Wound Team Following Weekly 02/16/24 1620   WOUND NURSE ONLY - Pressure  Injury Stage 4 02/16/24 1620   Wound Length (cm) 2.9 cm 02/16/24 1620   Wound Width (cm) 4.5 cm 02/16/24 1620   Wound Depth (cm) 3.6 cm 02/16/24 1620   Wound Surface Area (cm^2) 13.05 cm^2 02/16/24 1620   Wound Volume (cm^3) 46.98 cm^3 02/16/24 1620   Post-Procedure Length (cm) 2.9 cm 02/16/24 1620   Post-Procedure Width (cm) 4.6 cm 02/16/24 1620   Post-Procedure Depth (cm) 3.6 cm 02/16/24 1620   Post-Procedure Surface Area (cm^2) 13.34 cm^2 02/16/24 1620   Post-Procedure Volume (cm^3) 48.024 cm^3 02/16/24 1620   Wound Healing % 16 02/16/24 1620   Tunneling (cm) 0 cm 02/16/24 1620   Undermining (cm) 4.5 cm 02/16/24 1620   Undermining of Wound, 1st Location From 12 o'clock;To 1 o'clock 02/16/24 1620   Undermining (cm) - 2nd location 2.5 cm 02/16/24 1620   Undermining of Wound, 2nd Location From 7 o'clock;To 11 o'clock 02/16/24 1620   Wound Odor Mild 02/16/24 1620   Exposed Structures Bone 02/16/24 1620   Number of days: 67       Wound 01/31/24 Pressure Injury Heel Posterior Left (Active)   Wound Image   02/16/24 1620   Site Assessment Scabbed 02/16/24 1620   Periwound Assessment Fragile 02/16/24 1620   Margins Defined edges 02/16/24 1620   Drainage Amount None 02/16/24 1620   Drainage Description Serosanguineous 02/07/24 1500   Treatments Cleansed;Site care 02/16/24 1620   Wound Cleansing Normal Saline Irrigation 02/16/24 1620   Periwound Protectant Skin Protectant Wipes to Periwound 02/16/24 1620   Dressing Cleansing/Solutions Not Applicable 02/16/24 1620   Dressing Options Offloading Dressing - Heel;Tubigrip 02/16/24 1620   Dressing Change/Treatment Frequency Monday, Wednesday, Friday, and As Needed 02/16/24 1620   Wound Team Following Weekly 02/16/24 1620   WOUND NURSE ONLY - Pressure Injury Stage 3 02/16/24 1620   Wound Length (cm) 1.5 cm 01/31/24 1500   Wound Width (cm) 1.2 cm 01/31/24 1500   Wound Depth (cm) 0.1 cm 01/31/24 1500   Wound Surface Area (cm^2) 1.8 cm^2 01/31/24 1500   Wound Volume (cm^3) 0.18 cm^3  01/31/24 1500   Post-Procedure Length (cm) 0.4 cm 02/07/24 1500   Post-Procedure Width (cm) 0.4 cm 02/07/24 1500   Post-Procedure Depth (cm) 0.1 cm 02/07/24 1500   Post-Procedure Surface Area (cm^2) 0.16 cm^2 02/07/24 1500   Post-Procedure Volume (cm^3) 0.016 cm^3 02/07/24 1500   Tunneling (cm) 0 cm 02/16/24 1620   Undermining (cm) 0 cm 02/16/24 1620   Wound Odor None 02/16/24 1620   Exposed Structures None 02/16/24 1620   Number of days: 17       Wound 01/31/24 Pressure Injury Coccyx Inferior wound, previosly closed but reopened 1/31/24 (Active)   Wound Image    02/16/24 1620   Site Assessment Red;Yellow 02/16/24 1620   Periwound Assessment Fragile;Scar tissue 02/16/24 1620   Margins Attached edges 02/16/24 1620   Drainage Amount Moderate 02/16/24 1620   Drainage Description Serosanguineous 02/16/24 1620   Treatments Cleansed;Provider debridement 02/16/24 1620   Wound Cleansing Normal Saline Irrigation 02/16/24 1620   Periwound Protectant Skin Protectant Wipes to Periwound;Barrier Paste 02/16/24 1620   Dressing Cleansing/Solutions Not Applicable 02/16/24 1620   Dressing Options Hydrofiber Silver;Offloading Dressing - Sacral 02/16/24 1620   Dressing Change/Treatment Frequency Monday, Wednesday, Friday, and As Needed 02/16/24 1620   Wound Team Following Weekly 02/16/24 1620   WOUND NURSE ONLY - Pressure Injury Stage 3 02/16/24 1620   Wound Length (cm) 1 cm 02/16/24 1620   Wound Width (cm) 0.7 cm 02/16/24 1620   Wound Depth (cm) 0.5 cm 02/16/24 1620   Wound Surface Area (cm^2) 0.7 cm^2 02/16/24 1620   Wound Volume (cm^3) 0.35 cm^3 02/16/24 1620   Post-Procedure Length (cm) 1.1 cm 02/16/24 1620   Post-Procedure Width (cm) 0.8 cm 02/16/24 1620   Post-Procedure Depth (cm) 0.5 cm 02/16/24 1620   Post-Procedure Surface Area (cm^2) 0.88 cm^2 02/16/24 1620   Post-Procedure Volume (cm^3) 0.44 cm^3 02/16/24 1620   Wound Healing % -2 02/16/24 1620   Tunneling (cm) 0 cm 02/16/24 1620   Undermining (cm) 0 cm 02/16/24 1620   Wound  Odor None 02/16/24 1620   Exposed Structures None 02/16/24 1620   Number of days: 17       Wound 02/16/24 Full Thickness Wound Leg Medial Left (Active)   Wound Image     02/16/24 1620   Site Assessment Boggy 02/16/24 1620   Periwound Assessment Fragile 02/16/24 1620   Margins Attached edges 02/16/24 1620   Drainage Amount Moderate 02/16/24 1620   Drainage Description Serosanguineous 02/16/24 1620   Treatments Cleansed;Provider debridement;Site care;Hemostat foam 02/16/24 1620   Wound Cleansing Normal Saline Irrigation 02/16/24 1620   Periwound Protectant Skin Protectant Wipes to Periwound 02/16/24 1620   Dressing Cleansing/Solutions Not Applicable 02/16/24 1620   Dressing Options Surgiform;Silicone Adhesive Foam;Tubigrip 02/16/24 1620   Dressing Change/Treatment Frequency Monday, Wednesday, Friday, and As Needed 02/16/24 1620   Non-staged Wound Description Full thickness 02/16/24 1620   Post-Procedure Length (cm) 1.5 cm 02/16/24 1620   Post-Procedure Width (cm) 1.5 cm 02/16/24 1620   Post-Procedure Depth (cm) 0.6 cm 02/16/24 1620   Post-Procedure Surface Area (cm^2) 2.25 cm^2 02/16/24 1620   Post-Procedure Volume (cm^3) 1.35 cm^3 02/16/24 1620   Tunneling (cm) 0 cm 02/16/24 1620   Undermining (cm) 0 cm 02/16/24 1620   Wound Odor None 02/16/24 1620   Exposed Structures Bone 02/16/24 1620   Number of days: 1       PROCEDURE: Excisional debridement of sacral / coccygeal pressure injury ulcers,   -Curette used to debride wound bed inferior wound. Excisional debridement was performed to remove devitalized tissue until healthy, bleeding tissue was visualized.  Total wound area debrided 0.88 cm².  Tissue debrided into the subcutaneous layer  -Bleeding controlled with manual pressure.    -Wound care completed by wound RN, refer to flowsheet  -Patient tolerated the procedure well, without c/o pain or discomfort.      PROCEDURE: Excisional debridement of right ischial ulcer  - forceps and scissors used to lift and excise  "necrotic tissue from base of wound  -Curette used to debride wound bed.  Excisional debridement was performed to remove devitalized tissue until healthy, bleeding tissue was visualized.   Total area debrided approximately 13.34cm².  Tissue debrided into the muscle/fascia layer.    -Bleeding controlled with manual pressure.    -Wound care completed by wound RN, refer to flowsheet  -Patient tolerated the procedure well, without c/o pain or discomfort.    PROCEDURE: Excisional debridement of medial anterior leg ulcer  - forceps and scissors used to excise nonviable epithelium, dermis, subcutaneous tissue.  -Curette used to debride wound bed.  Excisional debridement was performed to remove devitalized tissue until healthy, bleeding tissue was visualized.  Removed bone fragment.   Total area debrided approximately 2.25 cm².  Tissue debrided into bone.    -Bleeding controlled with manual pressure, silver nitrate, Surgifoam, pressure dressing.    -Wound care completed by wound RN, refer to flowsheet  -Patient tolerated the procedure well, without c/o pain or discomfort.      Pertinent Labs and Diagnostics:    Labs:     A1c: No results found for: \"HBA1C\"     [facility] results, 7/1/2022 (under media tab)    CRP 13    ESR 31      IMAGING:     X-ray left tib-fib ordered 2/16/2024 through [facility]    12/11/2023-CT of abdomen pelvis with contrast  IMPRESSION:   1.  Right ischial decubitus ulcer extending to bone with soft tissue gas tracking along the right perineum. Appearance suggesting osteomyelitis, consider component of necrotizing fasciitis as clinically appropriate.  2.  Small pericardial effusion  3.  Left adrenal nodule, density on prior noncontrast CT demonstrates adenoma.  4.  Hepatomegaly  5.  Enlarged prostate, workup and evaluation for causes of prostate enlargement recommended as clinically appropriate.  6.  Atherosclerosis and atherosclerotic coronary artery disease    12/15/2023-bone scan of left " foot  IMPRESSION:     1.  Mild increased activity in the LEFT 1st and 3rd toes on blood pool and delayed images possibly indicating inflammation/infection.  2.  No significant blood flow asymmetry.          VASCULAR STUDIES: No results found.    LAST  WOUND CULTURE:   Lab Results   Component Value Date/Time    CULTRSULT  12/12/2023 08:04 PM     Culture discontinued due to excessive contamination.    CULTRSULT No fungal growth. 12/12/2023 08:04 PM    CULTRSULT - (A) 12/12/2023 08:04 PM    CULTRSULT Prevotella bivia  Moderate growth   (A) 12/12/2023 08:04 PM    CULTRSULT - (A) 12/12/2023 08:04 PM    CULTRSULT Enterococcus faecalis  Heavy growth   (A) 12/12/2023 08:04 PM    CULTRSULT (A) 12/12/2023 08:04 PM     Proteus mirabilis ESBL  Moderate growth  Extended Spectrum Beta-lactamase (ESBL) isolated.  ESBL's may be clinically resistant to therapy with  Penicillins,Cephalosporins or Aztreonam despite  apparent in vitro susceptibility to some of these agents.  The patient requires contact isolation.  Please contact pharmacy or an Infectious Disease Specialist  if you have any questions about appropriate therapy.        PATHOLOGY  2/17/2023-bone fragment extracted from left lower extremity wound\\  FINAL DIAGNOSIS:     A. Left leg bone fragment at base of chronic wound:          Extensively degenerated fibrocartilaginous tissue with a rim of           fibrinopurulent debris          Correlate with culture findings            Comment: While no residual intact bone is identified, these           findings are suggestive of adjacent osteomyelitis.      ASSESSMENT AND PLAN:     1. Sacral decubitus ulcer, stage IV ([facility])  Comments: Ulcer first noted in early April 2022 as small open area which quickly enlarged.  This ulcer is present distal from previous sacral ulcer which healed after surgery in January.  Patient has history of flap reconstruction x2 to this area.    2/16/2024: Patient returns to clinic after hospitalization  and LTAC stay.  3 coccygeal wounds, superior, middle wounds resolved.  Inferior with moderate maceration, decreased in size  - Excisional debridement of inferior wound was performed in clinic, medically necessary to promote wound healing.   -Patient to return to clinic weekly for assessment and debridement  -Home health to change dressing 2 times per week   -Patient does spend a lot of time up in his wheelchair, and wishes to continue to do so for his quality of life.  He lives independently, drives, and is involved with family activities.  He does have an appropriate pressure-relief cushion and is very well versed on pressure relieving techniques.  - Known OM that was previously treated.  CT scan done during recent hospitalization did not show OM of sacrum, however OM of the ischium noted.    Wound care: Silver Hydrofiber to manage exudate and bioburden, foam cover dressing, Hypafix tape     2.  Pressure injury of right ischium, stage IV  Comments: Abscess and OM found on CT during hospitalization in December 2023.  Patient underwent I&D with VAC placement.  IV antibiotics through 1/22/2024.    2/16/2024: Previously documented stage III in clinic.  Patient underwent I&D due to abscess and osteomyelitis, depth of wound is now to muscle and bone, and a stage IV.  Patient has completed 6-week course of IV antibiotics.  - Wound decreased in size, slough and odor present.  Dakin soaked performed during visit.  -Excisional debridement of nonviable tissue from wound in clinic today, medically necessary to promote wound healing.  -Continue dakin soaks  for 15 minutes prior to application of VAC. Continue to use puracyn gel.  -Continue wound VAC  -Patient to return to clinic weekly for assessment and debridement  -Home health to change dressing 2 times per week in between clinic visits   -Patient is very well versed on pressure relief measures, has adequate surfaces in wheelchair and on his bed.    Wound care:  NPWT at 125  mmHg to accelerate granulation, change 3 times per week, sacral offloading foam.     3. Postoperative wound dehiscence, subsequent encounter  4. Osteomyelitis of left lower extremity  Comments: On 4/26/2022 patient underwent irrigation and debridement of multiple compartments of the left lower extremity states for pressure injury, with bone excision, and complex closure of chronic wound using biologic skin substitute.  During postop visit in surgeons office on 5/11, surgical site was noted to be dehisced.      2/16/2024: Ulcer reoccurred with fluctuance and hematoma.  Historically this wound has closed and reopened several times.  Possible osteomyelitis of left foot was noted on bone scan during hospitalization.  Ortho was consulted, did not recommend any surgery.  - Excisional debridement performed today.  Medically necessary to promote wound healing.  Bone fragment removed.  Bone piece still palpated but well adhered  - X-ray left tib-fib ordered through [facility].  Follow results.  Will update Dr. Raman once results available.  -Patient to return to clinic weekly for assessment and debridement as needed  -Home health to change dressing 2 times per week in between clinic visits OM.  -Patient to be mindful of offloading when supine, should assure that his leg is not rotating medially    Wound care: Surgifoam, Hydrofiber foam    5. Deep tissue injury  6. Pressure injury of left foot, stage IV  Comments: DTI to posterior left lower leg first observed in clinic 7/29/2022.  Patient does not know how it started, had been wearing heel float boot consistently.  Evolved into stage IV pressure injury    2/16/2024: Plantar foot ulcer remains healed.  This wound has healed and reopened several times.  -No excisional debridement required today.  -Patient to return to clinic weekly for assessment and debridement  -Monitor site each clinic visit     Care: Open to air    7. Pressure injury of left heel, stage 3  ([facility])  Comments: First noted in clinic on 10/21/2022, originally noted to be stage II    2/16/2024: Remains stable, scab intact  -No debridement necessary.  -Patient to return to clinic weekly for assessment       Wound care: JOSE JUAN    8.  Pressure injury of left leg, stage III  2/16/2024: Left posterior leg, healed    9.  Quadriplegia, C5-C7, incomplete ([facility])  Comments: Complicating factor.  Impaired mobility and sensation  -Patient is still spending 7-8 hours/day up in his wheelchair, though he does have appropriate offloading cushion, and knows to reposition frequently.  Wears heel float boots bilaterally at all times  Wound care: Silver Hydrofiber to manage exudate and bioburden, foam cover dressing, Hypafix tape     Please note that this note may have been created using voice recognition software. I have worked with technical experts from [facility] to optimize the interface.  I have made every reasonable attempt to correct obvious errors, but there may be errors of grammar and possibly content that I did not discover before finalizing the note.

## 2024-02-17 NOTE — PROCEDURES
Wound vac removed by CNA, wound bed inspected by this RN, no retained foam noted. Wound vac <50cm applied to right ischium wound using 1 pieces of black foam. 1 piece of black foam to fill undermining, 1 piece of black foam to fill remainder of wound bed, 1 piece of black foam to bridge from wound to right hip.  Negative pressure wound therapy resumed at 125mmHg continuous pressure with no leaks noted.

## 2024-02-17 NOTE — PATIENT INSTRUCTIONS
-Keep your wound dressing clean, dry, and intact.    -Change your dressing if it becomes soiled, soaked, or falls off.    -Remove your compression wrap if you have severe pain, severe swelling, numbness, color change, or temperature change in your toes. If you need to remove your compression wrap, do so by unrolling it. Do not cut the compression wrap off to prevent cutting yourself on accident.    -Wound vac may not have any drainage in tube or cannister & it will still be working.   Change cannister if it does become full by pressing tab on side of machine to remove canister and snap on new one. Full canister can be thrown in the trash. If cannister fills with bright red blood - go to ER. Dressing will be changed every MWF at the wound clinic.  If you are having issues with your wound VAC, please consider patching leaks, changing the canister, or calling [phone] for troubleshooting. If the wound VAC has been off or un-operational for over 2 hours, call wound care center to inform them and remove all dressings including black foam and replace with normal saline damp gauze.     -Should you experience any significant changes in your wound(s), such as infection (redness, swelling, localized heat, increased pain, fever > 101 F, chills) or have any questions regarding your home care instructions, please contact the wound center at [phone]. If after hours, contact your primary care physician or go to the hospital emergency room.

## 2024-02-21 ENCOUNTER — OFFICE VISIT (OUTPATIENT)
Dept: WOUND CARE | Facility: MEDICAL CENTER | Age: 74
End: 2024-02-21
Attending: NURSE PRACTITIONER
Payer: MEDICARE

## 2024-02-21 ENCOUNTER — NON-PROVIDER VISIT (OUTPATIENT)
Dept: CARDIOLOGY | Facility: MEDICAL CENTER | Age: 74
End: 2024-02-21

## 2024-02-21 VITALS
RESPIRATION RATE: 18 BRPM | SYSTOLIC BLOOD PRESSURE: 108 MMHG | OXYGEN SATURATION: 94 % | DIASTOLIC BLOOD PRESSURE: 72 MMHG | HEART RATE: 83 BPM | TEMPERATURE: 98.4 F

## 2024-02-21 DIAGNOSIS — T14.8XXA DEEP TISSUE INJURY: ICD-10-CM

## 2024-02-21 DIAGNOSIS — L89.623 PRESSURE INJURY OF LEFT HEEL, STAGE 3 (HCC): ICD-10-CM

## 2024-02-21 DIAGNOSIS — L89.894 PRESSURE INJURY OF LEFT FOOT, STAGE 4 (HCC): ICD-10-CM

## 2024-02-21 DIAGNOSIS — L89.893 PRESSURE INJURY OF LEFT LEG, STAGE 3 (HCC): ICD-10-CM

## 2024-02-21 DIAGNOSIS — T81.31XD POSTOPERATIVE WOUND DEHISCENCE, SUBSEQUENT ENCOUNTER: ICD-10-CM

## 2024-02-21 DIAGNOSIS — G82.54 QUADRIPLEGIA, C5-C7 INCOMPLETE (HCC): ICD-10-CM

## 2024-02-21 DIAGNOSIS — M86.9 OSTEOMYELITIS OF LEFT LOWER EXTREMITY (HCC): ICD-10-CM

## 2024-02-21 DIAGNOSIS — L89.314 PRESSURE INJURY OF RIGHT ISCHIUM, STAGE 4 (HCC): ICD-10-CM

## 2024-02-21 DIAGNOSIS — L89.154 SACRAL DECUBITUS ULCER, STAGE IV (HCC): ICD-10-CM

## 2024-02-21 PROCEDURE — 11042 DBRDMT SUBQ TIS 1ST 20SQCM/<: CPT | Mod: 59 | Performed by: STUDENT IN AN ORGANIZED HEALTH CARE EDUCATION/TRAINING PROGRAM

## 2024-02-21 PROCEDURE — 11042 DBRDMT SUBQ TIS 1ST 20SQCM/<: CPT

## 2024-02-21 PROCEDURE — 11043 DBRDMT MUSC&/FSCA 1ST 20/<: CPT | Performed by: STUDENT IN AN ORGANIZED HEALTH CARE EDUCATION/TRAINING PROGRAM

## 2024-02-21 PROCEDURE — 3074F SYST BP LT 130 MM HG: CPT | Performed by: STUDENT IN AN ORGANIZED HEALTH CARE EDUCATION/TRAINING PROGRAM

## 2024-02-21 PROCEDURE — 3078F DIAST BP <80 MM HG: CPT | Performed by: STUDENT IN AN ORGANIZED HEALTH CARE EDUCATION/TRAINING PROGRAM

## 2024-02-21 PROCEDURE — 93298 REM INTERROG DEV EVAL SCRMS: CPT | Mod: 26 | Performed by: INTERNAL MEDICINE

## 2024-02-21 PROCEDURE — 11043 DBRDMT MUSC&/FSCA 1ST 20/<: CPT

## 2024-02-21 PROCEDURE — 1126F AMNT PAIN NOTED NONE PRSNT: CPT | Performed by: STUDENT IN AN ORGANIZED HEALTH CARE EDUCATION/TRAINING PROGRAM

## 2024-02-21 NOTE — CARDIAC REMOTE MONITOR - SCAN
Device transmission reviewed. Device demonstrated appropriate function. Short pause due to infrequent undersensing.      Electronically Signed by: Briana Bell M.D.    3/16/2024  10:49 AM

## 2024-02-21 NOTE — PROGRESS NOTES
Provider Encounter- Pressure Injury        HISTORY OF PRESENT ILLNESS  Wound History:    START OF CARE IN CLINIC: 1/31/2024 (return to clinic after hospitalization)    REFERRING PROVIDER: Racquel York       WOUNDS-superior coccyx pressure injury, stage IV                                 Coccyx pressure injury, stage IV           Inferior coccyx pressure injury, stage IV                                 Right ischial pressure injury, stage IV                                 Left heel pressure injury, recurring stage III                                 Left posterior lower leg/calf-stage III                                 Left medial lower leg surgical wound dehiscence-resolved, reopens frequently            HISTORY: Patient with history of incomplete quadriplegia referred to [facility] for treatment of a stage IV pressure injury.  He has a history of previous pressure injuries to this area, and underwent muscle flaps in 2019, and then again in 2020.  He was seen in the wound clinic in November 2021 for an ulcer proximal from his current ulcer, and pressure injuries to his left posterior lower leg and left heel.  At that time, it was discovered that the patient had retained VAC foam embedded in the wound bed of the sacral wound.  Attempts were made to get him back to his plastic surgeon, though unsuccessful.  In January he underwent surgical removal of VAC sponge along with excisional debridement of his sacral wound by Dr. Chaves.  After the surgery, his wound went on to heal without incident.   In early April 2022, his home health nurse noted a new sacral ulcer, below the previous ulcer which quickly tripled in size over the following weeks.  The ulcer to his left medial lower leg had also deteriorated, with bone visible at the base..  He was hospitalized from 4/22 until 4/27/2022 and underwent surgery with Dr. Raman on 4/26 for irrigation and debridement of multiple compartments of the left lower extremity, bone  excision, and complex closure of chronic wound using biologic skin substitute.   His sacrococcygeal wound was not surgically addressed during this admission.  He was discharged back to his group home, with home health, and referral to outpatient wound clinic for his sacral wound.  He was instructed to follow-up with his surgeon for his lower leg wound.       Postoperatively, the left medial lower leg incision dehisced.  He was seen by his surgeon at [facility] on 5/11.  The surgeon opted to leave remaining sutures in place, and refer him to the wound clinic for treatment of this wound.   Treatment of this wound was initiated in clinic on 5/12.  During this visit was also noted that his heel DTI had resolved, but that he had a new pressure injury to his left posterior lower extremity.     A new pressure injury was noted to patient's right upper buttock/lower back on 5/20/2022.  Wound was linear in shape, skin discolored but intact.     Abrasion noted to left anterior lower leg.  First observed in clinic on 7/22/2022.  Patient states he bumped his leg into his food tray.     Small DTI noted to patient's left lateral lower leg on 7/29/2022.  Skin intact but discolored.     Large area of deep tissue injury noted to patient's left exterior lower leg.  Patient denied any trauma to this area.  Skin intact.  Wound documented.    1/27/2023: Patient was admitted to [facility] from 1/23-1/25/2023 with gross hematuria. He underwent RICHARD which showed watchman device was in place and he was taken off of Xarelto. While hospitalized wound team was consulted. He was referred back to [facility] and home health upon discharge.    Patient was hospitalized at [facility] for pyelonephritis from 2/26 until 3/2/2023, admitted for fever and general malaise.  He was admitted and initially started on linezolid and meropenem for suspected UTI and history of multidrug-resistant organisms.  Urine cultures were negative. ID was consulted, recommended CT of chest and  abdomen,which were negative for acute findings. However, he was treated with 5 days total course of antibiotics for suspected UTI, and symptoms completely resolved.  During this admission, the inpatient wound team was consulted for treatment of his sacral and lower leg wounds.  A wound culture was taken from his left heel pressure ulcer, negative.  Once stabilized, he was discharged home and referred back to [facility] to resume treatment of his wounds.    Patient was hospitalized at [facility] from 12/11 until 12/23/2023, admitted for fever.  Wound infection suspected.  CT scan of abdomen and pelvis for evaluation of sacral pressure injury showed gas tracking down to the bone consistent with osteomyelitis.  He underwent I&D of right ischial ulcer (documented as buttock) with Dr. Bansal, medial tract leading to an abscess was identified.  Cavity was opened allowing it to drain into the main wound bed.  Wound VAC was placed and managed by wound team during this admission.  A bone scan of patient's left foot was also done, initially concerning for osteomyelitis.  Orthopedic surgery was consulted and did not recommend surgical intervention. ID consulted also, recommended the patient to receive IV ertapenem 1 g every 24 hours plus IV daptomycin 8 mg/kg every 24 hours through 1/22/2024.   He was discharged to LTAC on 1/23 for IV antibiotics and wound care.  From the LTAC he was discharged home on 1/22 with home health and referral back to [facility] to resume management of his wounds.      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  Never used smokeless tobacco    Patient's problem list, allergies, and current  medications reviewed and updated in Epic    Interval History:  Interval History thinned 7/29/2022.  Please see previous notes for complete interval history.   Interval History thinned 1/27/2023. Please see previous note for complete interval history.  Interval History thinned 3/3/2023.  Please see previous notes for complete interval history.    Interval History thinned 8/4/2023.  Please see previous notes for complete interval history.    Interval History thinned 1/31/2024.  Please see previous notes for complete interval history.      1/31/2024 Initial clinic visit with ADILSON Arthur, DAT-BC, ALEXN, CFTRACI.   Patient returns to clinic after hospitalization and LTAC stay.  VAC in place to right ischial wound.  Sacral wounds have progressed significantly since he was last seen in clinic.  Left medial wound has healed, though covered with friable tissue.  Left heel ulcer, previously resolved has reopened slightly.  He states that he is feeling well overall, denies fevers, chills, nausea, vomiting, cough or shortness of breath.     2/7/2024: Clinic visit with Steve Hodges MD. Patient reports feeling in normal state of health. Patient denies any alarms from wound VAC, however today he presented with significant odor from ischial wound and dressing was partially avulsed leaving in adequate suction. Machine was checked and functioning well, there were no recorded alarms.    2/16/24: Clinic visit with Dana DE ANDA, HOLDEN, ABBY, CFTRACI.  Pt denies fevers, chills, nausea, vomiting.  Left shin fluctuance to wound with hematoma and dark sanguinous drainage.  Bone fragment removed during debridement.  Obtain x-ray as residual bone palpated.  X-ray ordered through [facility].  Coccyx wound has decreased from 3 ulcers to now 1.  Right ischial wound with slough, odor.  Dakin soaked performed during visit.  Wound debrided.  Able to continue VAC postdebridement.     2/21/2024: Clinic visit with Steve  MD Tracie. Patient reports doing ok, reports feeling well. Left medial lower extremity wound is measuring larger with thick slough and friable tissue. Xray completed today, will undoubtedly demonstrate known OM. Patient denies any issues with wound VAC. Home health has been soaking Ischial wound with Dakins prior to applying wound VAC.      REVIEW OF SYSTEMS:   Unchanged from previous wound clinic assessment on 2/16/2024, except as noted in interval history above        PHYSICAL EXAMINATION:   /72   Pulse 83   Temp 36.9 °C (98.4 °F) (Temporal)   Resp 18   SpO2 94%   Physical Exam  Constitutional:       Appearance: He is obese.   Cardiovascular:      Rate and Rhythm: Normal rate.   Pulmonary:      Effort: Pulmonary effort is normal.   Abdominal:      Comments: Colostomy left lower quadrant   Genitourinary:     Comments: Suprapubic catheter to down drain   Skin:     Comments: Stage IV coccygeal pressure injury - previously 3 wounds, decreased to 1 wound at distal aspect.  Moderate maceration, minimal senescent tissue.      Stage IV pressure injury to right ischium- Wound largely unchanged, bone remains palpable in wound bed with some necrosis. Less slough and odor.  No periwound infection noted.    Full-thickness wound to left medial lower leg, surgical wound dehiscence- Wound waxes and wanes, open with thick layer of slough. Continues to have poor quality tissue, boggy and hyperemic.    Stage III pressure injury left posterior heel - intact    Stage IV pressure injury plantar foot -resolved    Stage III pressure injury to posterior left lower leg- Reopened, superficial.    Left shin: Resolved     Neurological:      Mental Status: He is alert and oriented to person, place, and time.   Psychiatric:         Mood and Affect: Mood normal.         WOUND ASSESSMENT  Wound 06/18/23 Pressure Injury Leg Posterior Left resolved 9/1/23, re-opened 11/17/23 (Active)   Wound Image    02/21/24 1600   Site Assessment  Red;Pink;Scabbed 02/21/24 1600   Periwound Assessment Blanchable erythema;Scar tissue 02/21/24 1600   Margins Defined edges 02/21/24 1600   Closure Secondary intention 12/06/23 1600   Drainage Amount Scant 02/21/24 1600   Drainage Description Serosanguineous 02/21/24 1600   Treatments Cleansed;Provider debridement 02/21/24 1600   Wound Cleansing Hypochlorus Acid 02/21/24 1600   Periwound Protectant Barrier Paste 02/21/24 1600   Dressing Changed New 02/07/24 1500   Dressing Cleansing/Solutions Not Applicable 02/21/24 1600   Dressing Options Hydrofiber Silver;Silicone Adhesive Foam;Tubigrip 02/21/24 1600   Dressing Change/Treatment Frequency Monday, Wednesday, Friday, and As Needed 02/21/24 1600   Wound Team Following Weekly 02/21/24 1600   WOUND NURSE ONLY - Pressure Injury Stage 3 02/21/24 1600   Non-staged Wound Description Not applicable 01/31/24 1500   Wound Length (cm) 0.6 cm 02/21/24 1600   Wound Width (cm) 1.9 cm 02/21/24 1600   Wound Depth (cm) 0.1 cm 02/21/24 1600   Wound Surface Area (cm^2) 1.14 cm^2 02/21/24 1600   Wound Volume (cm^3) 0.114 cm^3 02/21/24 1600   Post-Procedure Length (cm) 1 cm 02/21/24 1600   Post-Procedure Width (cm) 1.9 cm 02/21/24 1600   Post-Procedure Depth (cm) 0.1 cm 02/21/24 1600   Post-Procedure Surface Area (cm^2) 1.9 cm^2 02/21/24 1600   Post-Procedure Volume (cm^3) 0.19 cm^3 02/21/24 1600   Wound Healing % 89 02/21/24 1600   Tunneling (cm) 0 cm 02/21/24 1600   Undermining (cm) 0 cm 02/21/24 1600   Wound Odor None 02/21/24 1600   Exposed Structures None 02/21/24 1600   Number of days: 249       Wound 12/12/23 Pressure Injury Ischium Right (Active)   Wound Image    02/21/24 1600   Site Assessment Abel;Yellow;Grey 02/21/24 1600   Periwound Assessment Fragile 02/21/24 1600   Margins Unattached edges 02/21/24 1600   Drainage Amount Large 02/21/24 1600   Drainage Description Serosanguineous 02/21/24 1600   Treatments Cleansed;Provider debridement;Irrigation 02/21/24 1600   Wound  Cleansing Dakin's Solution 02/21/24 1600   Periwound Protectant Skin Protectant Wipes to Periwound;Drape 02/21/24 1600   Dressing Changed Changed 02/07/24 1500   Dressing Cleansing/Solutions Not Applicable 02/21/24 1600   Dressing Options Puracyn Gel;Wound Vac;Offloading Dressing - Sacral 02/21/24 1600   Dressing Change/Treatment Frequency Monday, Wednesday, Friday, and As Needed 02/21/24 1600   Wound Team Following Weekly 02/21/24 1600   WOUND NURSE ONLY - Pressure Injury Stage 4 02/21/24 1600   Wound Length (cm) 3 cm 02/21/24 1600   Wound Width (cm) 4 cm 02/21/24 1600   Wound Depth (cm) 4.2 cm 02/21/24 1600   Wound Surface Area (cm^2) 12 cm^2 02/21/24 1600   Wound Volume (cm^3) 50.4 cm^3 02/21/24 1600   Post-Procedure Length (cm) 3.1 cm 02/21/24 1600   Post-Procedure Width (cm) 4.1 cm 02/21/24 1600   Post-Procedure Depth (cm) 4.2 cm 02/21/24 1600   Post-Procedure Surface Area (cm^2) 12.71 cm^2 02/21/24 1600   Post-Procedure Volume (cm^3) 53.382 cm^3 02/21/24 1600   Wound Healing % 10 02/21/24 1600   Tunneling (cm) 0 cm 02/21/24 1600   Undermining (cm) 4.8 cm 02/21/24 1600   Undermining of Wound, 1st Location From 7 o'clock;To 2 o'clock 02/21/24 1600   Undermining (cm) - 2nd location 2.5 cm 02/16/24 1620   Undermining of Wound, 2nd Location From 7 o'clock;To 11 o'clock 02/16/24 1620   Wound Odor Other (Comments) 02/21/24 1600   Exposed Structures Bone 02/21/24 1600   Number of days: 72       Wound 01/31/24 Pressure Injury Coccyx Inferior wound, previosly closed but reopened 1/31/24 (Active)   Wound Image    02/21/24 1600   Site Assessment Red;White;Fragile 02/21/24 1600   Periwound Assessment Fragile;Scar tissue;Maceration 02/21/24 1600   Margins Attached edges;Unattached edges 02/21/24 1600   Drainage Amount Small 02/21/24 1600   Drainage Description Serosanguineous 02/21/24 1600   Treatments Cleansed;Provider debridement 02/21/24 1600   Wound Cleansing Hypochlorus Acid 02/21/24 1600   Periwound Protectant Barrier  Paste 02/21/24 1600   Dressing Cleansing/Solutions Not Applicable 02/21/24 1600   Dressing Options Hydrofiber Silver Strip;Hydrofiber Silver 02/21/24 1600   Dressing Change/Treatment Frequency Monday, Wednesday, Friday, and As Needed 02/21/24 1600   Wound Team Following Weekly 02/21/24 1600   WOUND NURSE ONLY - Pressure Injury Stage 3 02/21/24 1600   Wound Length (cm) 1.2 cm 02/21/24 1600   Wound Width (cm) 1 cm 02/21/24 1600   Wound Depth (cm) 0.7 cm 02/21/24 1600   Wound Surface Area (cm^2) 1.2 cm^2 02/21/24 1600   Wound Volume (cm^3) 0.84 cm^3 02/21/24 1600   Post-Procedure Length (cm) 1.2 cm 02/21/24 1600   Post-Procedure Width (cm) 1.1 cm 02/21/24 1600   Post-Procedure Depth (cm) 0.7 cm 02/21/24 1600   Post-Procedure Surface Area (cm^2) 1.32 cm^2 02/21/24 1600   Post-Procedure Volume (cm^3) 0.924 cm^3 02/21/24 1600   Wound Healing % -145 02/21/24 1600   Tunneling (cm) 0 cm 02/21/24 1600   Undermining (cm) 0 cm 02/21/24 1600   Wound Odor None 02/21/24 1600   Exposed Structures None 02/21/24 1600   Number of days: 22       Wound 02/16/24 Full Thickness Wound Leg Medial Left (Active)   Wound Image    02/21/24 1600   Site Assessment Red;Yellow 02/21/24 1600   Periwound Assessment Denuded;Fragile;Scar tissue 02/21/24 1600   Margins Attached edges;Unattached edges 02/21/24 1600   Drainage Amount Moderate 02/21/24 1600   Drainage Description Serosanguineous 02/21/24 1600   Treatments Cleansed;Provider debridement;Direct pressure 02/21/24 1600   Wound Cleansing Hypochlorus Acid 02/21/24 1600   Periwound Protectant Skin Protectant Wipes to Periwound;Barrier Paste 02/21/24 1600   Dressing Cleansing/Solutions Not Applicable 02/21/24 1600   Dressing Options Hydrofiber Silver;Silicone Adhesive Foam;Tubigrip 02/21/24 1600   Dressing Change/Treatment Frequency Monday, Wednesday, Friday, and As Needed 02/21/24 1600   Non-staged Wound Description Full thickness 02/21/24 1600   Wound Length (cm) 1.6 cm 02/21/24 1600   Wound  "Width (cm) 2.5 cm 02/21/24 1600   Wound Depth (cm) 0.6 cm 02/21/24 1600   Wound Surface Area (cm^2) 4 cm^2 02/21/24 1600   Wound Volume (cm^3) 2.4 cm^3 02/21/24 1600   Post-Procedure Length (cm) 1.7 cm 02/21/24 1600   Post-Procedure Width (cm) 2.5 cm 02/21/24 1600   Post-Procedure Depth (cm) 0.6 cm 02/21/24 1600   Post-Procedure Surface Area (cm^2) 4.25 cm^2 02/21/24 1600   Post-Procedure Volume (cm^3) 2.55 cm^3 02/21/24 1600   Tunneling (cm) 0 cm 02/21/24 1600   Undermining (cm) 0 cm 02/21/24 1600   Wound Odor None 02/21/24 1600   Exposed Structures Bone 02/21/24 1600   Number of days: 6       PROCEDURE: Excisional debridement of sacral / coccygeal pressure injury ulcer, left posterior leg   -Curette used to debride wound bed inferior wound. Excisional debridement was performed to remove devitalized tissue until healthy, bleeding tissue was visualized.  Total wound area debrided 3.22 cm².  Tissue debrided into the subcutaneous layer  -Bleeding controlled with manual pressure.    -Wound care completed by wound RN, refer to flowsheet  -Patient tolerated the procedure well, without c/o pain or discomfort.      PROCEDURE: Excisional debridement of right ischial ulcer, left medial lower extremity  -2% viscous lidocaine applied topically to wound bed for approximately 5 minutes prior to debridement  -Curette used to debride wound beds.  Excisional debridement was performed to remove devitalized tissue until healthy, bleeding tissue was visualized.   Entire surface of wounds, 16.96 cm2 debrided.  Tissue debrided into the muscle / fascia layer.    -Bleeding controlled with manual pressure.    -Wound care completed by wound RN, refer to flowsheet  -Patient tolerated the procedure well, without c/o pain or discomfort.      Pertinent Labs and Diagnostics:    Labs:     A1c: No results found for: \"HBA1C\"     [facility] results, 7/1/2022 (under media tab)    CRP 13    ESR 31      IMAGING:     X-ray left tib-fib ordered 2/16/2024 " through [facility]    12/11/2023-CT of abdomen pelvis with contrast  IMPRESSION:   1.  Right ischial decubitus ulcer extending to bone with soft tissue gas tracking along the right perineum. Appearance suggesting osteomyelitis, consider component of necrotizing fasciitis as clinically appropriate.  2.  Small pericardial effusion  3.  Left adrenal nodule, density on prior noncontrast CT demonstrates adenoma.  4.  Hepatomegaly  5.  Enlarged prostate, workup and evaluation for causes of prostate enlargement recommended as clinically appropriate.  6.  Atherosclerosis and atherosclerotic coronary artery disease    12/15/2023-bone scan of left foot  IMPRESSION:     1.  Mild increased activity in the LEFT 1st and 3rd toes on blood pool and delayed images possibly indicating inflammation/infection.  2.  No significant blood flow asymmetry.          VASCULAR STUDIES: No results found.    LAST  WOUND CULTURE:   Lab Results   Component Value Date/Time    CULTRSULT  12/12/2023 08:04 PM     Culture discontinued due to excessive contamination.    CULTRSULT No fungal growth. 12/12/2023 08:04 PM    CULTRSULT - (A) 12/12/2023 08:04 PM    CULTRSULT Prevotella bivia  Moderate growth   (A) 12/12/2023 08:04 PM    CULTRSULT - (A) 12/12/2023 08:04 PM    CULTRSULT Enterococcus faecalis  Heavy growth   (A) 12/12/2023 08:04 PM    CULTRSULT (A) 12/12/2023 08:04 PM     Proteus mirabilis ESBL  Moderate growth  Extended Spectrum Beta-lactamase (ESBL) isolated.  ESBL's may be clinically resistant to therapy with  Penicillins,Cephalosporins or Aztreonam despite  apparent in vitro susceptibility to some of these agents.  The patient requires contact isolation.  Please contact pharmacy or an Infectious Disease Specialist  if you have any questions about appropriate therapy.        PATHOLOGY  2/17/2023-bone fragment extracted from left lower extremity wound\\  FINAL DIAGNOSIS:     A. Left leg bone fragment at base of chronic wound:           Extensively degenerated fibrocartilaginous tissue with a rim of           fibrinopurulent debris          Correlate with culture findings            Comment: While no residual intact bone is identified, these           findings are suggestive of adjacent osteomyelitis.      ASSESSMENT AND PLAN:     1. Sacral decubitus ulcer, stage IV ([facility])  Comments: Ulcer first noted in early April 2022 as small open area which quickly enlarged.  This ulcer is present distal from previous sacral ulcer which healed after surgery in January.  Patient has history of flap reconstruction x2 to this area.    2/22/2024: Coccyx wound appears stable, improved from previously noted 3 wounds  - Excisional debridement of inferior wound was performed in clinic, medically necessary to promote wound healing.   -Patient to return to clinic weekly for assessment and debridement  -Home health to change dressing 2 times per week   -Patient does spend a lot of time up in his wheelchair, and wishes to continue to do so for his quality of life.  He lives independently, drives, and is involved with family activities.  He does have an appropriate pressure-relief cushion and is very well versed on pressure relieving techniques.  - Known OM that was previously treated.  CT scan done during recent hospitalization did not show OM of sacrum, however OM of the ischium noted.    Wound care: Silver Hydrofiber to manage exudate and bioburden, foam cover dressing, Hypafix tape     2.  Pressure injury of right ischium, stage IV  Comments: Abscess and OM found on CT during hospitalization in December 2023.  Patient underwent I&D with VAC placement.  IV antibiotics through 1/22/2024.    2/22/2024: Previously documented stage III in clinic.  Patient underwent I&D due to abscess and osteomyelitis, depth of wound is now to muscle and bone, and a stage IV.  Patient has completed 6-week course of IV antibiotics.  - Wound slightly smaller, continues to have exposed necrotic  bone.  Dakin soaked performed during visit.  -Excisional debridement of nonviable tissue from wound in clinic today, medically necessary to promote wound healing.  -Continue dakin soaks  for 15 minutes prior to application of VAC. Continue to use puracyn gel.  -Continue wound VAC  -Patient to return to clinic weekly for assessment and debridement  -Home health to change dressing 2 times per week in between clinic visits   -Patient is very well versed on pressure relief measures, has adequate surfaces in wheelchair and on his bed.    Wound care:  NPWT at -125 mmHg to accelerate granulation, change 3 times per week, sacral offloading foam.     3. Postoperative wound dehiscence, subsequent encounter  4. Osteomyelitis of left lower extremity  Comments: On 4/26/2022 patient underwent irrigation and debridement of multiple compartments of the left lower extremity states for pressure injury, with bone excision, and complex closure of chronic wound using biologic skin substitute.  During postop visit in surgeons office on 5/11, surgical site was noted to be dehisced.      2/22/2024: Wound has reopened.  Historically this wound has closed and reopened several times.  Possible osteomyelitis of left foot was noted on bone scan during hospitalization.  Ortho was consulted, did not recommend any surgery.  - Excisional debridement performed today.  Medically necessary to promote wound healing.  - X-ray left tib-fib ordered through [facility]. We expect to demonstrate known OM.  -Patient to return to clinic weekly for assessment and debridement as needed  -Home health to change dressing 2 times per week in between clinic visits  -Patient to be mindful of offloading when supine, should assure that his leg is not rotating medially    Wound care: Hydrofiber silver, silicone foam dressing    5. Deep tissue injury  6. Pressure injury of left foot, stage IV  Comments: DTI to posterior left lower leg first observed in clinic  7/29/2022.  Patient does not know how it started, had been wearing heel float boot consistently.  Evolved into stage IV pressure injury    2/22/2024: Plantar foot ulcer remains healed.  This wound has healed and reopened several times.  -No excisional debridement required today.  -Patient to return to clinic weekly for assessment and debridement  -Monitor site each clinic visit     Care: Open to air    7. Pressure injury of left heel, stage 3 ([facility])  Comments: First noted in clinic on 10/21/2022, originally noted to be stage II    2/22/2024: Appears resolved. Historically waxes and wanes  -No debridement necessary.  -Patient to return to clinic weekly for assessment       Wound care: JOSE JUAN    8.  Pressure injury of left leg, stage III  2/22/2024:   - Wound has reopened today  - Superficial  - Excisional debridement was performed in clinic, medically necessary to promote wound healing.   Wound Care: Hydrofiber silver, foam    9.  Quadriplegia, C5-C7, incomplete ([facility])  Comments: Complicating factor.  Impaired mobility and sensation  -Patient is still spending 7-8 hours/day up in his wheelchair, though he does have appropriate offloading cushion, and knows to reposition frequently.  Wears heel float boots bilaterally at all times  Wound care: Silver Hydrofiber to manage exudate and bioburden, foam cover dressing, Hypafix tape     Please note that this note may have been created using voice recognition software. I have worked with technical experts from [employer] [facility] to optimize the interface.  I have made every reasonable attempt to correct obvious errors, but there may be errors of grammar and possibly content that I did not discover before finalizing the note.

## 2024-02-22 ENCOUNTER — HOSPITAL ENCOUNTER (OUTPATIENT)
Facility: MEDICAL CENTER | Age: 74
End: 2024-02-22
Payer: MEDICARE

## 2024-02-22 PROBLEM — D35.00 ADRENAL ADENOMA: Status: ACTIVE | Noted: 2024-02-22

## 2024-02-22 PROCEDURE — 87077 CULTURE AEROBIC IDENTIFY: CPT

## 2024-02-22 PROCEDURE — 87086 URINE CULTURE/COLONY COUNT: CPT

## 2024-02-22 PROCEDURE — 87106 FUNGI IDENTIFICATION YEAST: CPT

## 2024-02-22 PROCEDURE — 87186 SC STD MICRODIL/AGAR DIL: CPT

## 2024-02-22 NOTE — PROGRESS NOTES
Updated home wound care order routed to [facility] health via [employer].

## 2024-02-22 NOTE — PATIENT INSTRUCTIONS
-Keep your wound dressing clean, dry, and intact. Only change dressing if it's over saturated, soiled or falls off.     -Change your dressing if it becomes soiled, soaked, or falls off.    -Remove your compression wrap if you have severe pain, severe swelling, numbness, color change, or temperature change in your toes. If you need to remove your compression wrap, do so by unrolling it. Do not cut the compression wrap off to prevent cutting yourself on accident.    -Wound vac may not have any drainage in tube or cannister & it will still be working.   Change cannister if it does become full by pressing tab on side of machine to remove canister and snap on new one. Full canister can be thrown in the trash. If cannister fills with bright red blood - go to ER. Dressing will be changed every MWF at the wound clinic.  If you are having issues with your wound VAC, please consider patching leaks, changing the canister, or calling [phone] for troubleshooting. If the wound VAC has been off or un-operational for over 2 hours, call wound care center to inform them and remove all dressings including black foam and replace with normal saline damp gauze.     - Resolved wound be fragile for a few days, bathe and dry area gently, only ever regains a maximum of 80% of the tensile strength of the surrounding skin, remodeling of scar can continue for 6mo - a year. Contact PCP for a referral back her if any problems with area opening and draining again.    -Should you experience any significant changes in your wound(s), such as infection (redness, swelling, localized heat, increased pain, fever > 101 F, chills) or have any questions regarding your home care instructions, please contact the wound center at [phone]. If after hours, contact your primary care physician or go to the hospital emergency room.

## 2024-02-28 ENCOUNTER — OFFICE VISIT (OUTPATIENT)
Dept: WOUND CARE | Facility: MEDICAL CENTER | Age: 74
End: 2024-02-28
Attending: NURSE PRACTITIONER
Payer: MEDICARE

## 2024-02-28 VITALS
SYSTOLIC BLOOD PRESSURE: 118 MMHG | DIASTOLIC BLOOD PRESSURE: 83 MMHG | HEART RATE: 76 BPM | RESPIRATION RATE: 18 BRPM | OXYGEN SATURATION: 96 % | TEMPERATURE: 97.9 F

## 2024-02-28 DIAGNOSIS — L89.154 SACRAL DECUBITUS ULCER, STAGE IV (HCC): ICD-10-CM

## 2024-02-28 DIAGNOSIS — L89.314 PRESSURE INJURY OF RIGHT ISCHIUM, STAGE 4 (HCC): ICD-10-CM

## 2024-02-28 DIAGNOSIS — G82.54 QUADRIPLEGIA, C5-C7 INCOMPLETE (HCC): ICD-10-CM

## 2024-02-28 DIAGNOSIS — L89.623 PRESSURE INJURY OF LEFT HEEL, STAGE 3 (HCC): ICD-10-CM

## 2024-02-28 DIAGNOSIS — M86.9 OSTEOMYELITIS OF LEFT LOWER EXTREMITY (HCC): ICD-10-CM

## 2024-02-28 DIAGNOSIS — T81.31XD POSTOPERATIVE WOUND DEHISCENCE, SUBSEQUENT ENCOUNTER: ICD-10-CM

## 2024-02-28 DIAGNOSIS — L89.893 PRESSURE INJURY OF LEFT LEG, STAGE 3 (HCC): ICD-10-CM

## 2024-02-28 DIAGNOSIS — L89.890 PRESSURE INJURY OF LEFT LEG, UNSTAGEABLE (HCC): ICD-10-CM

## 2024-02-28 DIAGNOSIS — T14.8XXA DEEP TISSUE INJURY: ICD-10-CM

## 2024-02-28 DIAGNOSIS — L89.894 PRESSURE INJURY OF LEFT FOOT, STAGE 4 (HCC): ICD-10-CM

## 2024-02-28 PROCEDURE — 97605 NEG PRS WND THER DME<=50SQCM: CPT

## 2024-02-28 PROCEDURE — 3074F SYST BP LT 130 MM HG: CPT | Performed by: STUDENT IN AN ORGANIZED HEALTH CARE EDUCATION/TRAINING PROGRAM

## 2024-02-28 PROCEDURE — 3079F DIAST BP 80-89 MM HG: CPT | Performed by: STUDENT IN AN ORGANIZED HEALTH CARE EDUCATION/TRAINING PROGRAM

## 2024-02-28 PROCEDURE — 11043 DBRDMT MUSC&/FSCA 1ST 20/<: CPT

## 2024-02-28 PROCEDURE — 11043 DBRDMT MUSC&/FSCA 1ST 20/<: CPT | Performed by: STUDENT IN AN ORGANIZED HEALTH CARE EDUCATION/TRAINING PROGRAM

## 2024-02-29 NOTE — PROGRESS NOTES
Provider Encounter- Pressure Injury        HISTORY OF PRESENT ILLNESS  Wound History:    START OF CARE IN CLINIC: 1/31/2024 (return to clinic after hospitalization)    REFERRING PROVIDER: Racquel York       WOUNDS-superior coccyx pressure injury, stage IV                                 Coccyx pressure injury, stage IV           Inferior coccyx pressure injury, stage IV                                 Right ischial pressure injury, stage IV                                 Left heel pressure injury, recurring stage III                                 Left posterior lower leg/calf-stage III                                 Left medial lower leg surgical wound dehiscence-resolved, reopens frequently            HISTORY: Patient with history of incomplete quadriplegia referred to [facility] for treatment of a stage IV pressure injury.  He has a history of previous pressure injuries to this area, and underwent muscle flaps in 2019, and then again in 2020.  He was seen in the wound clinic in November 2021 for an ulcer proximal from his current ulcer, and pressure injuries to his left posterior lower leg and left heel.  At that time, it was discovered that the patient had retained VAC foam embedded in the wound bed of the sacral wound.  Attempts were made to get him back to his plastic surgeon, though unsuccessful.  In January he underwent surgical removal of VAC sponge along with excisional debridement of his sacral wound by Dr. Chaves.  After the surgery, his wound went on to heal without incident.   In early April 2022, his home health nurse noted a new sacral ulcer, below the previous ulcer which quickly tripled in size over the following weeks.  The ulcer to his left medial lower leg had also deteriorated, with bone visible at the base..  He was hospitalized from 4/22 until 4/27/2022 and underwent surgery with Dr. Raman on 4/26 for irrigation and debridement of multiple compartments of the left lower extremity, bone  excision, and complex closure of chronic wound using biologic skin substitute.   His sacrococcygeal wound was not surgically addressed during this admission.  He was discharged back to his group home, with home health, and referral to outpatient wound clinic for his sacral wound.  He was instructed to follow-up with his surgeon for his lower leg wound.       Postoperatively, the left medial lower leg incision dehisced.  He was seen by his surgeon at [facility] on 5/11.  The surgeon opted to leave remaining sutures in place, and refer him to the wound clinic for treatment of this wound.   Treatment of this wound was initiated in clinic on 5/12.  During this visit was also noted that his heel DTI had resolved, but that he had a new pressure injury to his left posterior lower extremity.     A new pressure injury was noted to patient's right upper buttock/lower back on 5/20/2022.  Wound was linear in shape, skin discolored but intact.     Abrasion noted to left anterior lower leg.  First observed in clinic on 7/22/2022.  Patient states he bumped his leg into his food tray.     Small DTI noted to patient's left lateral lower leg on 7/29/2022.  Skin intact but discolored.     Large area of deep tissue injury noted to patient's left exterior lower leg.  Patient denied any trauma to this area.  Skin intact.  Wound documented.    1/27/2023: Patient was admitted to [facility] from 1/23-1/25/2023 with gross hematuria. He underwent RICHARD which showed watchman device was in place and he was taken off of Xarelto. While hospitalized wound team was consulted. He was referred back to [facility] and home health upon discharge.    Patient was hospitalized at [facility] for pyelonephritis from 2/26 until 3/2/2023, admitted for fever and general malaise.  He was admitted and initially started on linezolid and meropenem for suspected UTI and history of multidrug-resistant organisms.  Urine cultures were negative. ID was consulted, recommended CT of chest and  abdomen,which were negative for acute findings. However, he was treated with 5 days total course of antibiotics for suspected UTI, and symptoms completely resolved.  During this admission, the inpatient wound team was consulted for treatment of his sacral and lower leg wounds.  A wound culture was taken from his left heel pressure ulcer, negative.  Once stabilized, he was discharged home and referred back to [facility] to resume treatment of his wounds.    Patient was hospitalized at [facility] from 12/11 until 12/23/2023, admitted for fever.  Wound infection suspected.  CT scan of abdomen and pelvis for evaluation of sacral pressure injury showed gas tracking down to the bone consistent with osteomyelitis.  He underwent I&D of right ischial ulcer (documented as buttock) with Dr. Bansal, medial tract leading to an abscess was identified.  Cavity was opened allowing it to drain into the main wound bed.  Wound VAC was placed and managed by wound team during this admission.  A bone scan of patient's left foot was also done, initially concerning for osteomyelitis.  Orthopedic surgery was consulted and did not recommend surgical intervention. ID consulted also, recommended the patient to receive IV ertapenem 1 g every 24 hours plus IV daptomycin 8 mg/kg every 24 hours through 1/22/2024.   He was discharged to LTAC on 1/23 for IV antibiotics and wound care.  From the LTAC he was discharged home on 1/22 with home health and referral back to [facility] to resume management of his wounds.      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  Never used smokeless tobacco    Patient's problem list, allergies, and current  medications reviewed and updated in Epic    Interval History:  Interval History thinned 7/29/2022.  Please see previous notes for complete interval history.   Interval History thinned 1/27/2023. Please see previous note for complete interval history.  Interval History thinned 3/3/2023.  Please see previous notes for complete interval history.    Interval History thinned 8/4/2023.  Please see previous notes for complete interval history.    Interval History thinned 1/31/2024.  Please see previous notes for complete interval history.      1/31/2024 Initial clinic visit with ADILSON Arthur, DAT-BC, ALEXN, CFTRACI.   Patient returns to clinic after hospitalization and LTAC stay.  VAC in place to right ischial wound.  Sacral wounds have progressed significantly since he was last seen in clinic.  Left medial wound has healed, though covered with friable tissue.  Left heel ulcer, previously resolved has reopened slightly.  He states that he is feeling well overall, denies fevers, chills, nausea, vomiting, cough or shortness of breath.     2/7/2024: Clinic visit with Steve Hodges MD. Patient reports feeling in normal state of health. Patient denies any alarms from wound VAC, however today he presented with significant odor from ischial wound and dressing was partially avulsed leaving in adequate suction. Machine was checked and functioning well, there were no recorded alarms.    2/16/24: Clinic visit with Dana DE ANDA, HOLDEN, ABBY, CFTRACI.  Pt denies fevers, chills, nausea, vomiting.  Left shin fluctuance to wound with hematoma and dark sanguinous drainage.  Bone fragment removed during debridement.  Obtain x-ray as residual bone palpated.  X-ray ordered through [facility].  Coccyx wound has decreased from 3 ulcers to now 1.  Right ischial wound with slough, odor.  Dakin soaked performed during visit.  Wound debrided.  Able to continue VAC postdebridement.     2/21/2024: Clinic visit with Steve  MD Tracie. Patient reports doing ok, reports feeling well. Left medial lower extremity wound is measuring larger with thick slough and friable tissue. Xray completed today, will undoubtedly demonstrate known OM. Patient denies any issues with wound VAC. Home health has been soaking Ischial wound with Dakins prior to applying wound VAC.    2/28/2024: Clinic visit with Steve Hodges MD. Patient recently tested positive for COVID. Reports some congestion, cough, and brain fog. Denies other symptoms. Patient with new wounds to left lower extremity undoubtedly associated with known underlying OM. Patient's sacral and ischial wound appear to be improving, measuring smaller.    REVIEW OF SYSTEMS:   Unchanged from previous wound clinic assessment on 2/21/2024, except as noted in interval history above        PHYSICAL EXAMINATION:   /83   Pulse 76   Temp 36.6 °C (97.9 °F) (Temporal)   Resp 18   SpO2 96%   Physical Exam  Constitutional:       Appearance: He is obese.   Cardiovascular:      Rate and Rhythm: Normal rate.   Pulmonary:      Effort: Pulmonary effort is normal.   Abdominal:      Comments: Colostomy left lower quadrant   Genitourinary:     Comments: Suprapubic catheter to down drain   Skin:     Comments: Stage IV coccygeal pressure injury - previously 3 wounds, decreased to 1 wound at distal aspect. Measuring smaller.    Stage IV pressure injury to right ischium- Wound slightly smaller, less necrotic tissue and thin tissue over bone. Less slough and odor.  No periwound infection noted.    Full-thickness wound to left medial lower leg, surgical wound dehiscence- Wound waxes and wanes, open with thick layer of slough. Continues to have poor quality tissue, boggy and hyperemic.    Stage III pressure injury left posterior heel - intact    Stage IV pressure injury plantar foot -resolved    Stage III pressure injury to posterior left lower leg- Reopened, superficial.  Left shin: Reoccurrence, superficial      Neurological:      Mental Status: He is alert and oriented to person, place, and time.   Psychiatric:         Mood and Affect: Mood normal.         WOUND ASSESSMENT  Wound 06/18/23 Pressure Injury Leg Posterior Left resolved 9/1/23, re-opened 11/17/23 (Active)   Wound Image   02/28/24 1600   Site Assessment Dry;Crusted 02/28/24 1600   Periwound Assessment Blanchable erythema;Flaky;Fragile 02/28/24 1600   Margins Undefined edges 02/28/24 1600   Closure Secondary intention 12/06/23 1600   Drainage Amount None 02/28/24 1600   Drainage Description Serosanguineous 02/21/24 1600   Treatments Cleansed 02/28/24 1600   Wound Cleansing Normal Saline Irrigation 02/28/24 1600   Periwound Protectant Barrier Paste 02/28/24 1600   Dressing Changed New 02/07/24 1500   Dressing Cleansing/Solutions Not Applicable 02/28/24 1600   Dressing Options Dry Roll Gauze;Hypafix Tape;Tubigrip;Other (Comments) 02/28/24 1600   Dressing Change/Treatment Frequency Monday, Wednesday, Friday, and As Needed 02/28/24 1600   Wound Team Following Weekly 02/28/24 1600   WOUND NURSE ONLY - Pressure Injury Stage 3 02/28/24 1600   Non-staged Wound Description Not applicable 01/31/24 1500   Wound Length (cm) 0.6 cm 02/21/24 1600   Wound Width (cm) 1.9 cm 02/21/24 1600   Wound Depth (cm) 0.1 cm 02/21/24 1600   Wound Surface Area (cm^2) 1.14 cm^2 02/21/24 1600   Wound Volume (cm^3) 0.114 cm^3 02/21/24 1600   Post-Procedure Length (cm) 1 cm 02/21/24 1600   Post-Procedure Width (cm) 1.9 cm 02/21/24 1600   Post-Procedure Depth (cm) 0.1 cm 02/21/24 1600   Post-Procedure Surface Area (cm^2) 1.9 cm^2 02/21/24 1600   Post-Procedure Volume (cm^3) 0.19 cm^3 02/21/24 1600   Wound Healing % 89 02/21/24 1600   Tunneling (cm) 0 cm 02/28/24 1600   Undermining (cm) 0 cm 02/28/24 1600   Wound Odor None 02/28/24 1600   Exposed Structures None 02/28/24 1600   Number of days: 256       Wound 12/12/23 Pressure Injury Ischium Right (Active)   Wound Image    02/28/24 1600   Site  Assessment Red;Pink;Yellow 02/28/24 1600   Periwound Assessment Intact;Fragile 02/28/24 1600   Margins Unattached edges 02/28/24 1600   Drainage Amount Large 02/28/24 1600   Drainage Description Serosanguineous 02/28/24 1600   Treatments Cleansed;Provider debridement 02/28/24 1600   Wound Cleansing Dakin's Solution 02/28/24 1600   Periwound Protectant Skin Protectant Wipes to Periwound;Drape 02/28/24 1600   Dressing Changed Changed 02/28/24 1600   Dressing Cleansing/Solutions Not Applicable 02/28/24 1600   Dressing Options Puracyn Gel;Wound Vac;Other (Comments) 02/28/24 1600   Dressing Change/Treatment Frequency Monday, Wednesday, Friday, and As Needed 02/28/24 1600   Wound Team Following Weekly 02/28/24 1600   WOUND NURSE ONLY - Pressure Injury Stage 4 02/28/24 1600   Wound Length (cm) 3.2 cm 02/28/24 1600   Wound Width (cm) 3.4 cm 02/28/24 1600   Wound Depth (cm) 3.5 cm 02/28/24 1600   Wound Surface Area (cm^2) 10.88 cm^2 02/28/24 1600   Wound Volume (cm^3) 38.08 cm^3 02/28/24 1600   Post-Procedure Length (cm) 3.3 cm 02/28/24 1600   Post-Procedure Width (cm) 3.4 cm 02/28/24 1600   Post-Procedure Depth (cm) 3.6 cm 02/28/24 1600   Post-Procedure Surface Area (cm^2) 11.22 cm^2 02/28/24 1600   Post-Procedure Volume (cm^3) 40.392 cm^3 02/28/24 1600   Wound Healing % 32 02/28/24 1600   Tunneling (cm) 0 cm 02/28/24 1600   Undermining (cm) 3.9 cm 02/28/24 1600   Undermining of Wound, 1st Location From 7 o'clock;To 2 o'clock 02/28/24 1600   Undermining (cm) - 2nd location 2.5 cm 02/16/24 1620   Undermining of Wound, 2nd Location From 7 o'clock;To 11 o'clock 02/16/24 1620   Wound Odor Mild;Other (Comments) 02/28/24 1600   Exposed Structures Bone;Other (Comments) 02/28/24 1600   Number of days: 79       Wound 01/31/24 Pressure Injury Coccyx Inferior wound, previosly closed but reopened 1/31/24 (Active)   Wound Image    02/28/24 1600   Site Assessment Red;White;Yellow 02/28/24 1600   Periwound Assessment Fragile;Maceration  02/28/24 1600   Margins Attached edges;Unattached edges 02/28/24 1600   Drainage Amount Moderate 02/28/24 1600   Drainage Description Serosanguineous 02/28/24 1600   Treatments Cleansed;Provider debridement 02/28/24 1600   Wound Cleansing Hypochlorus Acid 02/28/24 1600   Periwound Protectant Skin Protectant Wipes to Periwound;Barrier Paste 02/28/24 1600   Dressing Cleansing/Solutions Not Applicable 02/28/24 1600   Dressing Options Hydrofiber Silver;Hydrofiber Silver Strip;Other (Comments) 02/28/24 1600   Dressing Change/Treatment Frequency Monday, Wednesday, Friday, and As Needed 02/28/24 1600   Wound Team Following Weekly 02/28/24 1600   WOUND NURSE ONLY - Pressure Injury Stage 3 02/28/24 1600   Wound Length (cm) 1 cm 02/28/24 1600   Wound Width (cm) 0.8 cm 02/28/24 1600   Wound Depth (cm) 0.3 cm 02/28/24 1600   Wound Surface Area (cm^2) 0.8 cm^2 02/28/24 1600   Wound Volume (cm^3) 0.24 cm^3 02/28/24 1600   Post-Procedure Length (cm) 1.2 cm 02/28/24 1600   Post-Procedure Width (cm) 0.8 cm 02/28/24 1600   Post-Procedure Depth (cm) 0.4 cm 02/28/24 1600   Post-Procedure Surface Area (cm^2) 0.96 cm^2 02/28/24 1600   Post-Procedure Volume (cm^3) 0.384 cm^3 02/28/24 1600   Wound Healing % 30 02/28/24 1600   Tunneling (cm) 0 cm 02/28/24 1600   Undermining (cm) 0 cm 02/28/24 1600   Wound Odor None 02/28/24 1600   Exposed Structures None;Other (Comments) 02/28/24 1600   Number of days: 29       Wound 02/16/24 Full Thickness Wound Leg Medial Left (Active)   Wound Image    02/28/24 1600   Site Assessment Red;Boggy;Other (Comment) 02/28/24 1600   Periwound Assessment Intact;Fragile 02/28/24 1600   Margins Attached edges;Unattached edges 02/28/24 1600   Drainage Amount Moderate 02/28/24 1600   Drainage Description Serosanguineous;Sanguineous 02/28/24 1600   Treatments Cleansed 02/28/24 1600   Wound Cleansing Normal Saline Irrigation 02/28/24 1600   Periwound Protectant Barrier Paste 02/28/24 1600   Dressing Cleansing/Solutions  "Not Applicable 02/28/24 1600   Dressing Options Hydrofiber Silver;Dry Roll Gauze;Hypafix Tape;Tubigrip;Other (Comments) 02/28/24 1600   Dressing Change/Treatment Frequency Monday, Wednesday, Friday, and As Needed 02/28/24 1600   Non-staged Wound Description Full thickness 02/28/24 1600   Wound Length (cm) 6 cm 02/28/24 1600   Wound Width (cm) 10 cm 02/28/24 1600   Wound Depth (cm) 0.5 cm 02/28/24 1600   Wound Surface Area (cm^2) 60 cm^2 02/28/24 1600   Wound Volume (cm^3) 30 cm^3 02/28/24 1600   Post-Procedure Length (cm) 1.7 cm 02/21/24 1600   Post-Procedure Width (cm) 2.5 cm 02/21/24 1600   Post-Procedure Depth (cm) 0.6 cm 02/21/24 1600   Post-Procedure Surface Area (cm^2) 4.25 cm^2 02/21/24 1600   Post-Procedure Volume (cm^3) 2.55 cm^3 02/21/24 1600   Wound Healing % -1150 02/28/24 1600   Tunneling (cm) 0 cm 02/28/24 1600   Undermining (cm) 0 cm 02/28/24 1600   Wound Odor None 02/28/24 1600   Exposed Structures Bone 02/28/24 1600   Number of days: 13       PROCEDURE: Excisional debridement of sacral / coccygeal pressure ulcer and right ischial ulcer  -Curette used to debride wound bed inferior wound. Excisional debridement was performed to remove devitalized tissue until healthy, bleeding tissue was visualized.  Total wound area debrided 12.18 cm².  Tissue debrided into the muscle / fascia layer  -Bleeding controlled with manual pressure.    -Wound care completed by wound RN, refer to flowsheet  -Patient tolerated the procedure well, without c/o pain or discomfort.      Pertinent Labs and Diagnostics:    Labs:     A1c: No results found for: \"HBA1C\"     [facility] results, 7/1/2022 (under media tab)    CRP 13    ESR 31      IMAGING:     X-ray left tib-fib ordered 2/16/2024 through [facility]    12/11/2023-CT of abdomen pelvis with contrast  IMPRESSION:   1.  Right ischial decubitus ulcer extending to bone with soft tissue gas tracking along the right perineum. Appearance suggesting osteomyelitis, consider " component of necrotizing fasciitis as clinically appropriate.  2.  Small pericardial effusion  3.  Left adrenal nodule, density on prior noncontrast CT demonstrates adenoma.  4.  Hepatomegaly  5.  Enlarged prostate, workup and evaluation for causes of prostate enlargement recommended as clinically appropriate.  6.  Atherosclerosis and atherosclerotic coronary artery disease    12/15/2023-bone scan of left foot  IMPRESSION:     1.  Mild increased activity in the LEFT 1st and 3rd toes on blood pool and delayed images possibly indicating inflammation/infection.  2.  No significant blood flow asymmetry.          VASCULAR STUDIES: No results found.    LAST  WOUND CULTURE:   Lab Results   Component Value Date/Time    CULTRSULT - (A) 02/22/2024 03:30 PM    CULTRSULT Klebsiella pneumoniae  >100,000 cfu/mL   (A) 02/22/2024 03:30 PM    CULTRSULT Candida tropicalis  10-50,000 cfu/mL   (A) 02/22/2024 03:30 PM      PATHOLOGY  2/17/2023-bone fragment extracted from left lower extremity wound\\  FINAL DIAGNOSIS:     A. Left leg bone fragment at base of chronic wound:          Extensively degenerated fibrocartilaginous tissue with a rim of           fibrinopurulent debris          Correlate with culture findings            Comment: While no residual intact bone is identified, these           findings are suggestive of adjacent osteomyelitis.      ASSESSMENT AND PLAN:     1. Sacral decubitus ulcer, stage IV ([facility])  Comments: Ulcer first noted in early April 2022 as small open area which quickly enlarged.  This ulcer is present distal from previous sacral ulcer which healed after surgery in January.  Patient has history of flap reconstruction x2 to this area.    2/28/2024: Coccyx wound appears improved, improved from previously noted 3 wounds  - Excisional debridement of inferior wound was performed in clinic, medically necessary to promote wound healing.   -Patient to return to clinic weekly for assessment and debridement  -Home  health to change dressing 2 times per week   -Patient does spend a lot of time up in his wheelchair, and wishes to continue to do so for his quality of life.  He lives independently, drives, and is involved with family activities.  He does have an appropriate pressure-relief cushion and is very well versed on pressure relieving techniques.  - Known OM that was previously treated.  CT scan done during recent hospitalization did not show OM of sacrum, however OM of the ischium noted.    Wound care: Silver Hydrofiber to manage exudate and bioburden, foam cover dressing, Hypafix tape     2.  Pressure injury of right ischium, stage IV  Comments: Abscess and OM found on CT during hospitalization in December 2023.  Patient underwent I&D with VAC placement.  IV antibiotics through 1/22/2024.    2/28/2024: Patient underwent I&D due to abscess and osteomyelitis, depth of wound is now to muscle and bone, and a stage IV.  Patient has completed 6-week course of IV antibiotics.  - Wound measuring smaller, thin layer of tissue over bone. Less necrotic tissue and odor.  -Excisional debridement of nonviable tissue from wound in clinic today, medically necessary to promote wound healing.  -Continue dakin soaks  for 15 minutes prior to application of VAC. Continue to use puracyn gel.  -Continue wound VAC  -Patient to return to clinic weekly for assessment and debridement  -Home health to change dressing 2 times per week in between clinic visits   -Patient is very well versed on pressure relief measures, has adequate surfaces in wheelchair and on his bed.    Wound care:  NPWT at -125 mmHg to accelerate granulation, change 3 times per week, sacral offloading foam.     3. Postoperative wound dehiscence, subsequent encounter  4. Osteomyelitis of left lower extremity  Comments: On 4/26/2022 patient underwent irrigation and debridement of multiple compartments of the left lower extremity states for pressure injury, with bone excision, and  complex closure of chronic wound using biologic skin substitute.  During postop visit in surgeons office on 5/11, surgical site was noted to be dehisced.      2/28/2024: Wound has reopened, stable  Historically this wound has closed and reopened several times.  Possible osteomyelitis of left foot was noted on bone scan during hospitalization.  Ortho was consulted, did not recommend any surgery.  - X-ray left tib-fib ordered through [facility]. We expect to demonstrate known OM.  -Patient to return to clinic weekly for assessment and debridement as needed  -Home health to change dressing 2 times per week in between clinic visits  -Patient to be mindful of offloading when supine, should assure that his leg is not rotating medially    Wound care: Hydrofiber silver, silicone foam dressing    5. Deep tissue injury  6. Pressure injury of left foot, stage IV  Comments: DTI to posterior left lower leg first observed in clinic 7/29/2022.  Patient does not know how it started, had been wearing heel float boot consistently.  Evolved into stage IV pressure injury    2/28/2024: Plantar foot ulcer remains healed.  This wound has healed and reopened several times.  -No excisional debridement required today.  -Patient to return to clinic weekly for assessment and debridement  -Monitor site each clinic visit     Care: Open to air    7. Pressure injury of left heel, stage 3 ([facility])  Comments: First noted in clinic on 10/21/2022, originally noted to be stage II    2/28/2024: Appears resolved. Historically waxes and wanes  -No debridement necessary.  -Patient to return to clinic weekly for assessment       Wound care: JOSE JUAN    8.  Pressure injury of left leg, stage III    2/28/2024:   - Posterior wound remains open though improving. New wound anterior leg is superficial.  - Excisional debridement was performed in clinic, medically necessary to promote wound healing.   Wound Care: Hydrofiber silver, foam    9.  Quadriplegia,  C5-C7, incomplete ([facility])  Comments: Complicating factor.  Impaired mobility and sensation  -Patient is still spending 7-8 hours/day up in his wheelchair, though he does have appropriate offloading cushion, and knows to reposition frequently.  Wears heel float boots bilaterally at all times  Wound care: Silver Hydrofiber to manage exudate and bioburden, foam cover dressing, Hypafix tape     Please note that this note may have been created using voice recognition software. I have worked with technical experts from [employer] to optimize the interface.  I have made every reasonable attempt to correct obvious errors, but there may be errors of grammar and possibly content that I did not discover before finalizing the note.

## 2024-03-06 ENCOUNTER — OFFICE VISIT (OUTPATIENT)
Dept: WOUND CARE | Facility: MEDICAL CENTER | Age: 74
End: 2024-03-06
Attending: NURSE PRACTITIONER
Payer: MEDICARE

## 2024-03-06 VITALS
RESPIRATION RATE: 18 BRPM | SYSTOLIC BLOOD PRESSURE: 108 MMHG | TEMPERATURE: 96.8 F | OXYGEN SATURATION: 95 % | HEART RATE: 60 BPM | DIASTOLIC BLOOD PRESSURE: 74 MMHG

## 2024-03-06 DIAGNOSIS — T14.8XXA DEEP TISSUE INJURY: ICD-10-CM

## 2024-03-06 DIAGNOSIS — L89.894 PRESSURE INJURY OF LEFT FOOT, STAGE 4 (HCC): ICD-10-CM

## 2024-03-06 DIAGNOSIS — L89.314 PRESSURE INJURY OF RIGHT ISCHIUM, STAGE 4 (HCC): Primary | ICD-10-CM

## 2024-03-06 DIAGNOSIS — M86.9 OSTEOMYELITIS OF LEFT LOWER EXTREMITY (HCC): ICD-10-CM

## 2024-03-06 DIAGNOSIS — L89.154 SACRAL DECUBITUS ULCER, STAGE IV (HCC): ICD-10-CM

## 2024-03-06 DIAGNOSIS — L89.893 PRESSURE INJURY OF LEFT LEG, STAGE 3 (HCC): ICD-10-CM

## 2024-03-06 DIAGNOSIS — L89.623 PRESSURE INJURY OF LEFT HEEL, STAGE 3 (HCC): ICD-10-CM

## 2024-03-06 DIAGNOSIS — T81.31XD POSTOPERATIVE WOUND DEHISCENCE, SUBSEQUENT ENCOUNTER: ICD-10-CM

## 2024-03-06 DIAGNOSIS — G82.54 QUADRIPLEGIA, C5-C7 INCOMPLETE (HCC): ICD-10-CM

## 2024-03-06 PROCEDURE — 3074F SYST BP LT 130 MM HG: CPT | Performed by: STUDENT IN AN ORGANIZED HEALTH CARE EDUCATION/TRAINING PROGRAM

## 2024-03-06 PROCEDURE — 11046 DBRDMT MUSC&/FSCA EA ADDL: CPT

## 2024-03-06 PROCEDURE — 3078F DIAST BP <80 MM HG: CPT | Performed by: STUDENT IN AN ORGANIZED HEALTH CARE EDUCATION/TRAINING PROGRAM

## 2024-03-06 PROCEDURE — 11043 DBRDMT MUSC&/FSCA 1ST 20/<: CPT | Performed by: STUDENT IN AN ORGANIZED HEALTH CARE EDUCATION/TRAINING PROGRAM

## 2024-03-06 PROCEDURE — 97605 NEG PRS WND THER DME<=50SQCM: CPT

## 2024-03-06 NOTE — PATIENT INSTRUCTIONS
Wound VAC at 125mmHg continuous or intermittent with black foam. Dressing will be changed every MWF at the wound clinic or with your home health nurse.  If you are having issues with your wound VAC, please consider patching leaks, changing the canister, or calling [phone] for troubleshooting. If the wound VAC has been off or un-operational for over 2 hours, call wound care center to inform them and remove all dressings including black foam and replace with normal saline damp gauze.     Should you experience any significant changes in your wound(s), such as infection (redness, swelling, localized heat, increased pain, fever > 101 F, chills) or have any questions regarding your home care instructions, please contact the wound center at [phone]. If after hours, contact your primary care physician or go to the hospital emergency room.   Keep dressing clean, dry and cover when bathing. Only change dressing if it's over saturated, soiled or falls off.

## 2024-03-06 NOTE — PROGRESS NOTES
Provider Encounter- Pressure Injury        HISTORY OF PRESENT ILLNESS  Wound History:    START OF CARE IN CLINIC: 1/31/2024 (return to clinic after hospitalization)    REFERRING PROVIDER: Racquel York       WOUNDS-superior coccyx pressure injury, stage IV                                 Coccyx pressure injury, stage IV           Inferior coccyx pressure injury, stage IV                                 Right ischial pressure injury, stage IV                                 Left heel pressure injury, recurring stage III                                 Left posterior lower leg/calf-stage III                                 Left medial lower leg surgical wound dehiscence-resolved, reopens frequently            HISTORY: Patient with history of incomplete quadriplegia referred to [facility] for treatment of a stage IV pressure injury.  He has a history of previous pressure injuries to this area, and underwent muscle flaps in 2019, and then again in 2020.  He was seen in the wound clinic in November 2021 for an ulcer proximal from his current ulcer, and pressure injuries to his left posterior lower leg and left heel.  At that time, it was discovered that the patient had retained VAC foam embedded in the wound bed of the sacral wound.  Attempts were made to get him back to his plastic surgeon, though unsuccessful.  In January he underwent surgical removal of VAC sponge along with excisional debridement of his sacral wound by Dr. Chaves.  After the surgery, his wound went on to heal without incident.   In early April 2022, his home health nurse noted a new sacral ulcer, below the previous ulcer which quickly tripled in size over the following weeks.  The ulcer to his left medial lower leg had also deteriorated, with bone visible at the base..  He was hospitalized from 4/22 until 4/27/2022 and underwent surgery with Dr. Raman on 4/26 for irrigation and debridement of multiple compartments of the left lower extremity, bone  excision, and complex closure of chronic wound using biologic skin substitute.   His sacrococcygeal wound was not surgically addressed during this admission.  He was discharged back to his group home, with home health, and referral to outpatient wound clinic for his sacral wound.  He was instructed to follow-up with his surgeon for his lower leg wound.       Postoperatively, the left medial lower leg incision dehisced.  He was seen by his surgeon at [facility] on 5/11.  The surgeon opted to leave remaining sutures in place, and refer him to the wound clinic for treatment of this wound.   Treatment of this wound was initiated in clinic on 5/12.  During this visit was also noted that his heel DTI had resolved, but that he had a new pressure injury to his left posterior lower extremity.     A new pressure injury was noted to patient's right upper buttock/lower back on 5/20/2022.  Wound was linear in shape, skin discolored but intact.     Abrasion noted to left anterior lower leg.  First observed in clinic on 7/22/2022.  Patient states he bumped his leg into his food tray.     Small DTI noted to patient's left lateral lower leg on 7/29/2022.  Skin intact but discolored.     Large area of deep tissue injury noted to patient's left exterior lower leg.  Patient denied any trauma to this area.  Skin intact.  Wound documented.    1/27/2023: Patient was admitted to [facility] from 1/23-1/25/2023 with gross hematuria. He underwent RICHARD which showed watchman device was in place and he was taken off of Xarelto. While hospitalized wound team was consulted. He was referred back to [facility] and home health upon discharge.    Patient was hospitalized at [facility] for pyelonephritis from 2/26 until 3/2/2023, admitted for fever and general malaise.  He was admitted and initially started on linezolid and meropenem for suspected UTI and history of multidrug-resistant organisms.  Urine cultures were negative. ID was consulted, recommended CT of chest and  abdomen,which were negative for acute findings. However, he was treated with 5 days total course of antibiotics for suspected UTI, and symptoms completely resolved.  During this admission, the inpatient wound team was consulted for treatment of his sacral and lower leg wounds.  A wound culture was taken from his left heel pressure ulcer, negative.  Once stabilized, he was discharged home and referred back to [facility] to resume treatment of his wounds.    Patient was hospitalized at [facility] from 12/11 until 12/23/2023, admitted for fever.  Wound infection suspected.  CT scan of abdomen and pelvis for evaluation of sacral pressure injury showed gas tracking down to the bone consistent with osteomyelitis.  He underwent I&D of right ischial ulcer (documented as buttock) with Dr. Bansal, medial tract leading to an abscess was identified.  Cavity was opened allowing it to drain into the main wound bed.  Wound VAC was placed and managed by wound team during this admission.  A bone scan of patient's left foot was also done, initially concerning for osteomyelitis.  Orthopedic surgery was consulted and did not recommend surgical intervention. ID consulted also, recommended the patient to receive IV ertapenem 1 g every 24 hours plus IV daptomycin 8 mg/kg every 24 hours through 1/22/2024.   He was discharged to LTAC on 1/23 for IV antibiotics and wound care.  From the LTAC he was discharged home on 1/22 with home health and referral back to [facility] to resume management of his wounds.      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  Never used smokeless tobacco    Patient's problem list, allergies, and current  medications reviewed and updated in Epic    Interval History:  Interval History thinned 7/29/2022.  Please see previous notes for complete interval history.   Interval History thinned 1/27/2023. Please see previous note for complete interval history.  Interval History thinned 3/3/2023.  Please see previous notes for complete interval history.    Interval History thinned 8/4/2023.  Please see previous notes for complete interval history.    Interval History thinned 1/31/2024.  Please see previous notes for complete interval history.      1/31/2024 Initial clinic visit with ADILSON Arthur, DAT-BC, ALEXN, CFTRACI.   Patient returns to clinic after hospitalization and LTAC stay.  VAC in place to right ischial wound.  Sacral wounds have progressed significantly since he was last seen in clinic.  Left medial wound has healed, though covered with friable tissue.  Left heel ulcer, previously resolved has reopened slightly.  He states that he is feeling well overall, denies fevers, chills, nausea, vomiting, cough or shortness of breath.     2/7/2024: Clinic visit with Steve Hodges MD. Patient reports feeling in normal state of health. Patient denies any alarms from wound VAC, however today he presented with significant odor from ischial wound and dressing was partially avulsed leaving in adequate suction. Machine was checked and functioning well, there were no recorded alarms.    2/16/24: Clinic visit with Dana DE ANDA, HOLDEN, ABBY, CFTRACI.  Pt denies fevers, chills, nausea, vomiting.  Left shin fluctuance to wound with hematoma and dark sanguinous drainage.  Bone fragment removed during debridement.  Obtain x-ray as residual bone palpated.  X-ray ordered through [facility].  Coccyx wound has decreased from 3 ulcers to now 1.  Right ischial wound with slough, odor.  Dakin soaked performed during visit.  Wound debrided.  Able to continue VAC postdebridement.     2/21/2024: Clinic visit with Steve  MD Tracie. Patient reports doing ok, reports feeling well. Left medial lower extremity wound is measuring larger with thick slough and friable tissue. Xray completed today, will undoubtedly demonstrate known OM. Patient denies any issues with wound VAC. Home health has been soaking Ischial wound with Dakins prior to applying wound VAC.    2/28/2024: Clinic visit with Steve Hodges MD. Patient recently tested positive for COVID. Reports some congestion, cough, and brain fog. Denies other symptoms. Patient with new wounds to left lower extremity undoubtedly associated with known underlying OM. Patient's sacral and ischial wound appear to be improving, measuring smaller.    3/6/2024: Clinic visit with Steve Hodges MD. Patient reports feeling much better, COVID symptoms have resolved. Patient's left lower extremity wounds are stable, posterior leg wounds appear resolved. Patient sacral wound is stable and ischial wound is improving.    REVIEW OF SYSTEMS:   Unchanged from previous wound clinic assessment on 2/28/2024, except as noted in interval history above        PHYSICAL EXAMINATION:   /74   Pulse 60   Temp 36 °C (96.8 °F) (Skin)   Resp 18   SpO2 95%   Physical Exam  Constitutional:       Appearance: He is obese.   Cardiovascular:      Rate and Rhythm: Normal rate.   Pulmonary:      Effort: Pulmonary effort is normal.   Abdominal:      Comments: Colostomy left lower quadrant   Genitourinary:     Comments: Suprapubic catheter to down drain   Skin:     Comments: Stage IV coccygeal pressure injury - previously 3 wounds, decreased to 1 wound at distal aspect. Wound with less granulation tissue and exposed bone. Stable.    Stage IV pressure injury to right ischium- Wound measuring smaller, increased tissue over bone. There is no necrotic tissue or odor today. No evidence of gross infection.    Full-thickness wound to left medial lower leg, surgical wound dehiscence- Wound waxes and wanes, Stable.  Continues to have poor quality tissue, boggy and hyperemic.    Stage III pressure injury left posterior heel - Resolved    Stage IV pressure injury plantar foot -resolved    Stage III pressure injury to posterior left lower leg- improved     Neurological:      Mental Status: He is alert and oriented to person, place, and time.   Psychiatric:         Mood and Affect: Mood normal.         WOUND ASSESSMENT  Wound 12/12/23 Pressure Injury Ischium Right (Active)   Wound Image    03/06/24 1000   Site Assessment Red;Granulation tissue 03/06/24 1000   Periwound Assessment Intact;Fragile 03/06/24 1000   Margins Defined edges;Unattached edges 03/06/24 1000   Drainage Amount Moderate 03/06/24 1000   Drainage Description Serosanguineous 03/06/24 1000   Treatments Cleansed;Provider debridement 03/06/24 1000   Offloading/DME Other (comment) 03/06/24 1000   Wound Cleansing Hypochlorus Acid 03/06/24 1000   Periwound Protectant No-sting Skin Prep 03/06/24 1000   Dressing Changed Changed 03/06/24 1000   Dressing Cleansing/Solutions Not Applicable 03/06/24 1000   Dressing Options Wound Vac 03/06/24 1000   Dressing Change/Treatment Frequency Monday, Wednesday, Friday, and As Needed 03/06/24 1000   Wound Team Following Weekly 03/06/24 1000   WOUND NURSE ONLY - Pressure Injury Stage 4 03/06/24 1000   Wound Length (cm) 3.8 cm 03/06/24 1000   Wound Width (cm) 3.5 cm 03/06/24 1000   Wound Depth (cm) 3.1 cm 03/06/24 1000   Wound Surface Area (cm^2) 13.3 cm^2 03/06/24 1000   Wound Volume (cm^3) 41.23 cm^3 03/06/24 1000   Post-Procedure Length (cm) 3.9 cm 03/06/24 1000   Post-Procedure Width (cm) 3.5 cm 03/06/24 1000   Post-Procedure Depth (cm) 3.1 cm 03/06/24 1000   Post-Procedure Surface Area (cm^2) 13.65 cm^2 03/06/24 1000   Post-Procedure Volume (cm^3) 42.315 cm^3 03/06/24 1000   Wound Healing % 26 03/06/24 1000   Wound Bed Granulation (%) 100 % 03/06/24 1000   Tunneling (cm) 3.2 cm 03/06/24 1000   Tunneling Clock Position of Wound 2  03/06/24 1000   Undermining (cm) 1.6 cm 03/06/24 1000   Undermining of Wound, 1st Location From 7 o'clock;From 4 o'clock 03/06/24 1000   Undermining (cm) - 2nd location 2.5 cm 02/16/24 1620   Undermining of Wound, 2nd Location From 7 o'clock;To 11 o'clock 02/16/24 1620   Wound Odor None 03/06/24 1000   Exposed Structures Bone;Other (Comments) 03/06/24 1000   Number of days: 85       Wound 01/31/24 Pressure Injury Coccyx Inferior wound, previosly closed but reopened 1/31/24 (Active)   Wound Image    03/06/24 1000   Site Assessment Yellow;Slough;Red 03/06/24 1000   Periwound Assessment Fragile;Scar tissue;Satellite lesions 03/06/24 1000   Margins Attached edges 03/06/24 1000   Drainage Amount Small 03/06/24 1000   Drainage Description Serosanguineous 03/06/24 1000   Treatments Cleansed;Provider debridement 03/06/24 1000   Wound Cleansing Hypochlorus Acid 03/06/24 1000   Periwound Protectant No-sting Skin Prep;Barrier Paste 03/06/24 1000   Dressing Cleansing/Solutions Normal Saline 03/06/24 1000   Dressing Options Hydrofiber Silver;Offloading Dressing - Sacral;Collagen Dressing 03/06/24 1000   Dressing Change/Treatment Frequency Monday, Wednesday, Friday, and As Needed 03/06/24 1000   Wound Team Following Weekly 03/06/24 1000   WOUND NURSE ONLY - Pressure Injury Stage 3 02/28/24 1600   Wound Length (cm) 1.4 cm 03/06/24 1000   Wound Width (cm) 1.3 cm 03/06/24 1000   Wound Depth (cm) 0.3 cm 03/06/24 1000   Wound Surface Area (cm^2) 1.82 cm^2 03/06/24 1000   Wound Volume (cm^3) 0.546 cm^3 03/06/24 1000   Post-Procedure Length (cm) 1.5 cm 03/06/24 1000   Post-Procedure Width (cm) 1.3 cm 03/06/24 1000   Post-Procedure Depth (cm) 0.4 cm 03/06/24 1000   Post-Procedure Surface Area (cm^2) 1.95 cm^2 03/06/24 1000   Post-Procedure Volume (cm^3) 0.78 cm^3 03/06/24 1000   Wound Healing % -59 03/06/24 1000   Wound Bed Slough (%) 30 % 03/06/24 1000   Tunneling (cm) 0 cm 03/06/24 1000   Undermining (cm) 0 cm 03/06/24 1000   Wound  Odor None 03/06/24 1000   Exposed Structures None;Other (Comments) 03/06/24 1000   Number of days: 35       Wound 02/16/24 Full Thickness Wound Leg Medial Left (Active)   Wound Image   03/06/24 1000   Site Assessment Red;Dark edges 03/06/24 1000   Periwound Assessment Fragile;Scar tissue 03/06/24 1000   Margins Attached edges 03/06/24 1000   Drainage Amount Moderate 03/06/24 1000   Drainage Description Serosanguineous 03/06/24 1000   Treatments Cleansed 03/06/24 1000   Wound Cleansing Hypochlorus Acid 03/06/24 1000   Periwound Protectant No-sting Skin Prep;Barrier Paste 03/06/24 1000   Dressing Cleansing/Solutions Not Applicable 03/06/24 1000   Dressing Options Hydrofiber Silver;Offloading Dressing - Sacral;Dry Roll Gauze;Hypafix Tape;Offloading Dressing - Heel;Tubigrip 03/06/24 1000   Dressing Change/Treatment Frequency Monday, Wednesday, Friday, and As Needed 03/06/24 1000   Wound Team Following Weekly 03/06/24 1000   Non-staged Wound Description Full thickness 03/06/24 1000   Wound Length (cm) 8.5 cm 03/06/24 1000   Wound Width (cm) 9.8 cm 03/06/24 1000   Wound Depth (cm) 0.4 cm 03/06/24 1000   Wound Surface Area (cm^2) 83.3 cm^2 03/06/24 1000   Wound Volume (cm^3) 33.32 cm^3 03/06/24 1000   Post-Procedure Length (cm) 1.7 cm 02/21/24 1600   Post-Procedure Width (cm) 2.5 cm 02/21/24 1600   Post-Procedure Depth (cm) 0.6 cm 02/21/24 1600   Post-Procedure Surface Area (cm^2) 4.25 cm^2 02/21/24 1600   Post-Procedure Volume (cm^3) 2.55 cm^3 02/21/24 1600   Wound Healing % -1288 03/06/24 1000   Tunneling (cm) 0 cm 03/06/24 1000   Undermining (cm) 0 cm 03/06/24 1000   Wound Odor None 03/06/24 1000   Exposed Structures None 03/06/24 1000   Number of days: 19       PROCEDURE: Excisional debridement of sacral / coccygeal pressure ulcer and right ischial ulcer  -Curette used to debride wound bed inferior wound. Excisional debridement was performed to remove devitalized tissue until healthy, bleeding tissue was visualized.   "Total wound area debrided 15.6 cm².  Tissue debrided into the muscle / fascia layer  -Bleeding controlled with manual pressure.    -Wound care completed by wound RN, refer to flowsheet  -Patient tolerated the procedure well, without c/o pain or discomfort.      Pertinent Labs and Diagnostics:    Labs:     A1c: No results found for: \"HBA1C\"     [facility] results, 7/1/2022 (under media tab)    CRP 13    ESR 31      IMAGING:     X-ray left tib-fib ordered 2/16/2024 through [facility]    12/11/2023-CT of abdomen pelvis with contrast  IMPRESSION:   1.  Right ischial decubitus ulcer extending to bone with soft tissue gas tracking along the right perineum. Appearance suggesting osteomyelitis, consider component of necrotizing fasciitis as clinically appropriate.  2.  Small pericardial effusion  3.  Left adrenal nodule, density on prior noncontrast CT demonstrates adenoma.  4.  Hepatomegaly  5.  Enlarged prostate, workup and evaluation for causes of prostate enlargement recommended as clinically appropriate.  6.  Atherosclerosis and atherosclerotic coronary artery disease    12/15/2023-bone scan of left foot  IMPRESSION:     1.  Mild increased activity in the LEFT 1st and 3rd toes on blood pool and delayed images possibly indicating inflammation/infection.  2.  No significant blood flow asymmetry.          VASCULAR STUDIES: No results found.    LAST  WOUND CULTURE:   Lab Results   Component Value Date/Time    CULTRSULT - (A) 02/22/2024 03:30 PM    CULTRSULT Klebsiella pneumoniae  >100,000 cfu/mL   (A) 02/22/2024 03:30 PM    CULTRSULT Candida tropicalis  10-50,000 cfu/mL   (A) 02/22/2024 03:30 PM      PATHOLOGY  2/17/2023-bone fragment extracted from left lower extremity wound\\  FINAL DIAGNOSIS:     A. Left leg bone fragment at base of chronic wound:          Extensively degenerated fibrocartilaginous tissue with a rim of           fibrinopurulent debris          Correlate with culture findings            Comment: " While no residual intact bone is identified, these           findings are suggestive of adjacent osteomyelitis.      ASSESSMENT AND PLAN:     1. Sacral decubitus ulcer, stage IV ([facility])  Comments: Ulcer first noted in early April 2022 as small open area which quickly enlarged.  This ulcer is present distal from previous sacral ulcer which healed after surgery in January.  Patient has history of flap reconstruction x2 to this area.    3/6/2024: Coccyx wound appears stable  - Excisional debridement of inferior wound was performed in clinic, medically necessary to promote wound healing.   -Patient to return to clinic weekly for assessment and debridement  -Home health to change dressing 2 times per week   -Patient does spend a lot of time up in his wheelchair, and wishes to continue to do so for his quality of life.  He lives independently, drives, and is involved with family activities.  He does have an appropriate pressure-relief cushion and is very well versed on pressure relieving techniques.  - Known OM that was previously treated.  CT scan done during recent hospitalization did not show OM of sacrum, however OM of the ischium noted.    Wound care: Collagen, Silver Hydrofiber to manage exudate and bioburden, foam cover dressing, Hypafix tape     2.  Pressure injury of right ischium, stage IV  Comments: Abscess and OM found on CT during hospitalization in December 2023.  Patient underwent I&D with VAC placement.  IV antibiotics through 1/22/2024.    3/6/2024: Patient underwent I&D due to abscess and osteomyelitis, depth of wound is now to muscle and bone, and a stage IV.  Patient has completed 6-week course of IV antibiotics.  - Wound measuring smaller with increased tissue over bone, no necrotic tissue or odor today.  -Excisional debridement of nonviable tissue from wound in clinic today, medically necessary to promote wound healing.  - Hold Dakins soaks. Instructions to be given to home health to resume if they  note odor and slough.  -Continue wound VAC  -Patient to return to clinic weekly for assessment and debridement  -Home health to change dressing 2 times per week in between clinic visits   -Patient is very well versed on pressure relief measures, has adequate surfaces in wheelchair and on his bed.    Wound care:  NPWT at -125 mmHg to accelerate granulation, change 3 times per week, sacral offloading foam.     3. Postoperative wound dehiscence, subsequent encounter  4. Osteomyelitis of left lower extremity  Comments: On 4/26/2022 patient underwent irrigation and debridement of multiple compartments of the left lower extremity states for pressure injury, with bone excision, and complex closure of chronic wound using biologic skin substitute.  During postop visit in surgeons office on 5/11, surgical site was noted to be dehisced.      3/6/2024: Wounds historically wax and wane. Stable  Historically this wound has closed and reopened several times.  Possible osteomyelitis of left foot was noted on bone scan during hospitalization.  Ortho was consulted, did not recommend any surgery.  - X-ray left tib-fib ordered through [facility].  -Patient to return to clinic weekly for assessment and debridement as needed  -Home health to change dressing 2 times per week in between clinic visits  -Patient to be mindful of offloading when supine, should assure that his leg is not rotating medially    Wound care: Hydrofiber silver, silicone foam dressing    5. Deep tissue injury  6. Pressure injury of left foot, stage IV  Comments: DTI to posterior left lower leg first observed in clinic 7/29/2022.  Patient does not know how it started, had been wearing heel float boot consistently.  Evolved into stage IV pressure injury    3/6/2024: Plantar foot ulcer remains healed.  This wound has healed and reopened several times.  -No excisional debridement required today.  -Patient to return to clinic weekly for assessment and  debridement  -Monitor site each clinic visit     Care: Open to air    7. Pressure injury of left heel, stage 3 (HCC)  Comments: First noted in clinic on 10/21/2022, originally noted to be stage II    3/6/2024: Remains resolved. Historically waxes and wanes  -No debridement necessary.  -Patient to return to clinic weekly for assessment   Wound care: Prevalon boot    8.  Pressure injury of left leg, stage III    3/6/2024:   - Anterior leg wound remains open. Posterior leg wound has resolved. May continue to Wax and wane  - No excisional debridement performed today.   Wound Care: Hydrofiber silver, foam    9.  Quadriplegia, C5-C7, incomplete (HCC)  Comments: Complicating factor.  Impaired mobility and sensation  -Patient is still spending 7-8 hours/day up in his wheelchair, though he does have appropriate offloading cushion, and knows to reposition frequently.  Wears heel float boots bilaterally at all times  Wound care: Silver Hydrofiber to manage exudate and bioburden, foam cover dressing, Hypafix tape     Please note that this note may have been created using voice recognition software. I have worked with technical experts from [facility] [employer] to optimize the interface.  I have made every reasonable attempt to correct obvious errors, but there may be errors of grammar and possibly content that I did not discover before finalizing the note.

## 2024-03-13 ENCOUNTER — OFFICE VISIT (OUTPATIENT)
Dept: WOUND CARE | Facility: MEDICAL CENTER | Age: 74
End: 2024-03-13
Attending: NURSE PRACTITIONER
Payer: MEDICARE

## 2024-03-13 VITALS
OXYGEN SATURATION: 93 % | RESPIRATION RATE: 18 BRPM | SYSTOLIC BLOOD PRESSURE: 109 MMHG | DIASTOLIC BLOOD PRESSURE: 64 MMHG | HEART RATE: 72 BPM | TEMPERATURE: 98.2 F

## 2024-03-13 DIAGNOSIS — G82.54 QUADRIPLEGIA, C5-C7 INCOMPLETE (HCC): ICD-10-CM

## 2024-03-13 DIAGNOSIS — M86.9 OSTEOMYELITIS OF LEFT LOWER EXTREMITY (HCC): ICD-10-CM

## 2024-03-13 DIAGNOSIS — T14.8XXA DEEP TISSUE INJURY: ICD-10-CM

## 2024-03-13 DIAGNOSIS — T81.31XD POSTOPERATIVE WOUND DEHISCENCE, SUBSEQUENT ENCOUNTER: ICD-10-CM

## 2024-03-13 DIAGNOSIS — L89.623 PRESSURE INJURY OF LEFT HEEL, STAGE 3 (HCC): ICD-10-CM

## 2024-03-13 DIAGNOSIS — L89.894 PRESSURE INJURY OF LEFT FOOT, STAGE 4 (HCC): ICD-10-CM

## 2024-03-13 DIAGNOSIS — L89.154 SACRAL DECUBITUS ULCER, STAGE IV (HCC): ICD-10-CM

## 2024-03-13 DIAGNOSIS — L89.893 PRESSURE INJURY OF LEFT LEG, STAGE 3 (HCC): ICD-10-CM

## 2024-03-13 DIAGNOSIS — L89.314 PRESSURE INJURY OF RIGHT ISCHIUM, STAGE 4 (HCC): ICD-10-CM

## 2024-03-13 PROCEDURE — 11043 DBRDMT MUSC&/FSCA 1ST 20/<: CPT | Performed by: NURSE PRACTITIONER

## 2024-03-13 PROCEDURE — 3074F SYST BP LT 130 MM HG: CPT | Performed by: NURSE PRACTITIONER

## 2024-03-13 PROCEDURE — 11042 DBRDMT SUBQ TIS 1ST 20SQCM/<: CPT

## 2024-03-13 PROCEDURE — 11043 DBRDMT MUSC&/FSCA 1ST 20/<: CPT

## 2024-03-13 PROCEDURE — 97605 NEG PRS WND THER DME<=50SQCM: CPT

## 2024-03-13 PROCEDURE — 3078F DIAST BP <80 MM HG: CPT | Performed by: NURSE PRACTITIONER

## 2024-03-14 NOTE — PATIENT INSTRUCTIONS
-Keep your wound dressing clean, dry, and intact.     -Wound vac may not have any drainage in tube or cannister & it will still be working.   Change cannister if it does become full by pressing tab on side of machine to remove canister and snap on new one. Full canister can be thrown in the trash. If cannister fills with bright red blood - go to ER. Dressing will be changed every MWF at the wound clini.  If you are having issues with your wound VAC, please consider patching leaks, changing the canister, or calling [phone] for troubleshooting. If the wound VAC has been off or un-operational for over 2 hours, call wound care center to inform them and remove all dressings including black foam and replace with normal saline damp gauze.     -Should you experience any significant changes in your wound(s), such as infection (redness, swelling, localized heat, increased pain, fever > 101 F, chills) or have any questions regarding your home care instructions, please contact the wound center at [phone]. If after hours, contact your primary care physician or go to the hospital emergency room.

## 2024-03-14 NOTE — PROGRESS NOTES
Provider Encounter- Pressure Injury        HISTORY OF PRESENT ILLNESS  Wound History:    START OF CARE IN CLINIC: 1/31/2024 (return to clinic after hospitalization)    REFERRING PROVIDER: Racquel York       WOUNDS-superior coccyx pressure injury, stage IV                                 Coccyx pressure injury, stage IV           Inferior coccyx pressure injury, stage IV                                 Right ischial pressure injury, stage IV                                 Left heel pressure injury, recurring stage III                                 Left posterior lower leg/calf-stage III                                 Left medial lower leg surgical wound dehiscence-resolved, reopens frequently            HISTORY: Patient with history of incomplete quadriplegia referred to [facility] for treatment of a stage IV pressure injury.  He has a history of previous pressure injuries to this area, and underwent muscle flaps in 2019, and then again in 2020.  He was seen in the wound clinic in November 2021 for an ulcer proximal from his current ulcer, and pressure injuries to his left posterior lower leg and left heel.  At that time, it was discovered that the patient had retained VAC foam embedded in the wound bed of the sacral wound.  Attempts were made to get him back to his plastic surgeon, though unsuccessful.  In January he underwent surgical removal of VAC sponge along with excisional debridement of his sacral wound by Dr. Chaves.  After the surgery, his wound went on to heal without incident.   In early April 2022, his home health nurse noted a new sacral ulcer, below the previous ulcer which quickly tripled in size over the following weeks.  The ulcer to his left medial lower leg had also deteriorated, with bone visible at the base..  He was hospitalized from 4/22 until 4/27/2022 and underwent surgery with Dr. Raman on 4/26 for irrigation and debridement of multiple compartments of the left lower extremity, bone  excision, and complex closure of chronic wound using biologic skin substitute.   His sacrococcygeal wound was not surgically addressed during this admission.  He was discharged back to his group home, with home health, and referral to outpatient wound clinic for his sacral wound.  He was instructed to follow-up with his surgeon for his lower leg wound.       Postoperatively, the left medial lower leg incision dehisced.  He was seen by his surgeon at [facility] on 5/11.  The surgeon opted to leave remaining sutures in place, and refer him to the wound clinic for treatment of this wound.   Treatment of this wound was initiated in clinic on 5/12.  During this visit was also noted that his heel DTI had resolved, but that he had a new pressure injury to his left posterior lower extremity.     A new pressure injury was noted to patient's right upper buttock/lower back on 5/20/2022.  Wound was linear in shape, skin discolored but intact.     Abrasion noted to left anterior lower leg.  First observed in clinic on 7/22/2022.  Patient states he bumped his leg into his food tray.     Small DTI noted to patient's left lateral lower leg on 7/29/2022.  Skin intact but discolored.     Large area of deep tissue injury noted to patient's left exterior lower leg.  Patient denied any trauma to this area.  Skin intact.  Wound documented.    1/27/2023: Patient was admitted to [facility] from 1/23-1/25/2023 with gross hematuria. He underwent RICHARD which showed watchman device was in place and he was taken off of Xarelto. While hospitalized wound team was consulted. He was referred back to [facility] and home health upon discharge.    Patient was hospitalized at [facility] for pyelonephritis from 2/26 until 3/2/2023, admitted for fever and general malaise.  He was admitted and initially started on linezolid and meropenem for suspected UTI and history of multidrug-resistant organisms.  Urine cultures were negative. ID was consulted, recommended CT of chest and  abdomen,which were negative for acute findings. However, he was treated with 5 days total course of antibiotics for suspected UTI, and symptoms completely resolved.  During this admission, the inpatient wound team was consulted for treatment of his sacral and lower leg wounds.  A wound culture was taken from his left heel pressure ulcer, negative.  Once stabilized, he was discharged home and referred back to [facility] to resume treatment of his wounds.    Patient was hospitalized at [facility] from 12/11 until 12/23/2023, admitted for fever.  Wound infection suspected.  CT scan of abdomen and pelvis for evaluation of sacral pressure injury showed gas tracking down to the bone consistent with osteomyelitis.  He underwent I&D of right ischial ulcer (documented as buttock) with Dr. Bansal, medial tract leading to an abscess was identified.  Cavity was opened allowing it to drain into the main wound bed.  Wound VAC was placed and managed by wound team during this admission.  A bone scan of patient's left foot was also done, initially concerning for osteomyelitis.  Orthopedic surgery was consulted and did not recommend surgical intervention. ID consulted also, recommended the patient to receive IV ertapenem 1 g every 24 hours plus IV daptomycin 8 mg/kg every 24 hours through 1/22/2024.   He was discharged to LTAC on 1/23 for IV antibiotics and wound care.  From the LTAC he was discharged home on 1/22 with home health and referral back to [facility] to resume management of his wounds  .      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  Never used smokeless tobacco    Patient's problem list, allergies, and  current medications reviewed and updated in Epic    Interval History:  Interval History thinned 7/29/2022.  Please see previous notes for complete interval history.   Interval History thinned 1/27/2023. Please see previous note for complete interval history.  Interval History thinned 3/3/2023.  Please see previous notes for complete interval history.    Interval History thinned 8/4/2023.  Please see previous notes for complete interval history.    Interval History thinned 1/31/2024.  Please see previous notes for complete interval history.      1/31/2024 Initial clinic visit with ADILSON Arthur, HOLDEN, ALEXN, CFTRACI.   Patient returns to clinic after hospitalization and LTAC stay.  VAC in place to right ischial wound.  Sacral wounds have progressed significantly since he was last seen in clinic.  Left medial wound has healed, though covered with friable tissue.  Left heel ulcer, previously resolved has reopened slightly.  He states that he is feeling well overall, denies fevers, chills, nausea, vomiting, cough or shortness of breath.     2/7/2024: Clinic visit with Steve Hodges MD. Patient reports feeling in normal state of health. Patient denies any alarms from wound VAC, however today he presented with significant odor from ischial wound and dressing was partially avulsed leaving in adequate suction. Machine was checked and functioning well, there were no recorded alarms.    2/16/24: Clinic visit with Dana DE ANDA, HOLDEN, ABBY, CFTRACI.  Pt denies fevers, chills, nausea, vomiting.  Left shin fluctuance to wound with hematoma and dark sanguinous drainage.  Bone fragment removed during debridement.  Obtain x-ray as residual bone palpated.  X-ray ordered through [facility].  Coccyx wound has decreased from 3 ulcers to now 1.  Right ischial wound with slough, odor.  Dakin soaked performed during visit.  Wound debrided.  Able to continue VAC postdebridement.     2/21/2024: Clinic visit with  Steve Hodges MD. Patient reports doing ok, reports feeling well. Left medial lower extremity wound is measuring larger with thick slough and friable tissue. Xray completed today, will undoubtedly demonstrate known OM. Patient denies any issues with wound VAC. Home health has been soaking Ischial wound with Dakins prior to applying wound VAC.    2/28/2024: Clinic visit with Steve Hodges MD. Patient recently tested positive for COVID. Reports some congestion, cough, and brain fog. Denies other symptoms. Patient with new wounds to left lower extremity undoubtedly associated with known underlying OM. Patient's sacral and ischial wound appear to be improving, measuring smaller.    3/6/2024: Clinic visit with Steve Hodges MD. Patient reports feeling much better, COVID symptoms have resolved. Patient's left lower extremity wounds are stable, posterior leg wounds appear resolved. Patient sacral wound is stable and ischial wound is improving.    3/13/2024 : Clinic visit with ADILSON Arthur, FNP-BC, ALEXN, CFRTACI.   Patient states he is feeling well, offers no complaints.  Left lower extremity wounds continue to wax and wane.  Sacral and ischial wounds slowly progressing    REVIEW OF SYSTEMS:   Unchanged from previous wound clinic assessment on 3/6/2024, except as noted in interval history above        PHYSICAL EXAMINATION:   /64   Pulse 72   Temp 36.8 °C (98.2 °F) (Temporal)   Resp 18   SpO2 93%   Physical Exam  Constitutional:       Appearance: He is obese.   Cardiovascular:      Rate and Rhythm: Normal rate.   Pulmonary:      Effort: Pulmonary effort is normal.   Abdominal:      Comments: Colostomy left lower quadrant   Genitourinary:     Comments: Suprapubic catheter to down drain   Skin:     Comments: Stage IV coccygeal pressure injury - previously 3 wounds, decreased to one wound at distal aspect.  Wound area has not changed significantly.  Moderate serosanguineous drainage, slough to wound  bed.    Stage IV pressure injury to right ischium-wound area has decreased, increased granulation tissue.  Moderate serosanguineous drainage, thin layer of slough to wound bed.    Full-thickness wound to left medial lower leg, surgical wound dehiscence- Wound waxes and wanes.  Area has decreased significantly since last assessment, however poor tissue quality persist.    Stage III pressure injury left posterior heel - Resolved    Stage IV pressure injury plantar foot -resolved    Stage III pressure injury to posterior left lower leg-no longer open, covered with dark red/purple scar tissue.     Neurological:      Mental Status: He is alert and oriented to person, place, and time.   Psychiatric:         Mood and Affect: Mood normal.         WOUND ASSESSMENT  Wound 12/12/23 Pressure Injury Ischium Right (Active)   Wound Image   03/13/24 1545   Site Assessment Red;Granulation tissue;Yellow 03/13/24 1545   Periwound Assessment Fragile;Scar tissue 03/13/24 1545   Margins Unattached edges 03/13/24 1545   Drainage Amount Large 03/13/24 1545   Drainage Description Serosanguineous 03/13/24 1545   Treatments Cleansed;Provider debridement;Silver nitrate;Irrigation 03/13/24 1545   Offloading/DME Other (comment) 03/06/24 1000   Wound Cleansing Hypochlorus Acid 03/13/24 1545   Periwound Protectant Skin Protectant Wipes to Periwound;Drape 03/13/24 1545   Dressing Changed Changed 03/13/24 1545   Dressing Cleansing/Solutions Not Applicable 03/13/24 1545   Dressing Options Wound Vac;Non-boardered adherent foam 03/13/24 1545   Dressing Change/Treatment Frequency Monday, Wednesday, Friday, and As Needed 03/13/24 1545   Wound Team Following Weekly 03/13/24 1545   WOUND NURSE ONLY - Pressure Injury Stage 4 03/13/24 1545   Wound Length (cm) 3 cm 03/13/24 1545   Wound Width (cm) 3.5 cm 03/13/24 1545   Wound Depth (cm) 3 cm 03/13/24 1545   Wound Surface Area (cm^2) 10.5 cm^2 03/13/24 1545   Wound Volume (cm^3) 31.5 cm^3 03/13/24 1545    Post-Procedure Length (cm) 3.4 cm 03/13/24 1545   Post-Procedure Width (cm) 3.5 cm 03/13/24 1545   Post-Procedure Depth (cm) 3 cm 03/13/24 1545   Post-Procedure Surface Area (cm^2) 11.9 cm^2 03/13/24 1545   Post-Procedure Volume (cm^3) 35.7 cm^3 03/13/24 1545   Wound Healing % 44 03/13/24 1545   Wound Bed Granulation (%) 100 % 03/06/24 1000   Tunneling (cm) 3.2 cm 03/06/24 1000   Tunneling Clock Position of Wound 2 03/06/24 1000   Undermining (cm) 4 cm 03/13/24 1545   Undermining of Wound, 1st Location From 7 o'clock;To 3 o'clock 03/13/24 1545   Undermining (cm) - 2nd location 2.5 cm 02/16/24 1620   Undermining of Wound, 2nd Location From 7 o'clock;To 11 o'clock 02/16/24 1620   Wound Odor Mild 03/13/24 1545   Exposed Structures Bone;Fascia 03/13/24 1545   Number of days: 93       Wound 01/31/24 Pressure Injury Coccyx Inferior wound, previosly closed but reopened 1/31/24 (Active)   Wound Image   03/13/24 1545   Site Assessment Red;Pink;Yellow 03/13/24 1545   Periwound Assessment Maceration;Fragile;Scar tissue 03/13/24 1545   Margins Attached edges;Unattached edges 03/13/24 1545   Drainage Amount Small 03/13/24 1545   Drainage Description Serosanguineous 03/13/24 1545   Treatments Cleansed;Provider debridement 03/13/24 1545   Wound Cleansing Hypochlorus Acid 03/13/24 1545   Periwound Protectant Skin Protectant Wipes to Periwound;Barrier Paste 03/13/24 1545   Dressing Cleansing/Solutions Normal Saline 03/13/24 1545   Dressing Options Hydrofiber Silver Strip;Hydrofiber Silver;Offloading Dressing - Sacral 03/13/24 1545   Dressing Change/Treatment Frequency Monday, Wednesday, Friday, and As Needed 03/13/24 1545   Wound Team Following Weekly 03/13/24 1545   WOUND NURSE ONLY - Pressure Injury Stage 3 03/13/24 1545   Wound Length (cm) 1.5 cm 03/13/24 1545   Wound Width (cm) 1.2 cm 03/13/24 1545   Wound Depth (cm) 0.5 cm 03/13/24 1545   Wound Surface Area (cm^2) 1.8 cm^2 03/13/24 1545   Wound Volume (cm^3) 0.9 cm^3 03/13/24  1545   Post-Procedure Length (cm) 1.5 cm 03/13/24 1545   Post-Procedure Width (cm) 1.3 cm 03/13/24 1545   Post-Procedure Depth (cm) 0.6 cm 03/13/24 1545   Post-Procedure Surface Area (cm^2) 1.95 cm^2 03/13/24 1545   Post-Procedure Volume (cm^3) 1.17 cm^3 03/13/24 1545   Wound Healing % -162 03/13/24 1545   Wound Bed Slough (%) 30 % 03/06/24 1000   Tunneling (cm) 0 cm 03/13/24 1545   Undermining (cm) 0.3 cm 03/13/24 1545   Undermining of Wound, 1st Location From 11 o'clock;To 1 o'clock 03/13/24 1545   Wound Odor None 03/13/24 1545   Exposed Structures None;Other (Comments) 03/13/24 1545   Number of days: 43       Wound 02/16/24 Full Thickness Wound Leg Medial Left (Active)   Wound Image   03/13/24 1545   Site Assessment Red 03/13/24 1545   Periwound Assessment Crusted;Flaky;Fragile;Scar tissue 03/13/24 1545   Margins Attached edges 03/13/24 1545   Drainage Amount Moderate 03/13/24 1545   Drainage Description Serosanguineous 03/13/24 1545   Treatments Cleansed;Nonselective debridement 03/13/24 1545   Wound Cleansing Hypochlorus Acid 03/13/24 1545   Periwound Protectant Barrier Paste;Skin Moisturizer 03/13/24 1545   Dressing Cleansing/Solutions Not Applicable 03/13/24 1545   Dressing Options Hydrofiber Silver;Offloading Dressing - Sacral;Tubigrip 03/13/24 1545   Dressing Change/Treatment Frequency Monday, Wednesday, Friday, and As Needed 03/13/24 1545   Wound Team Following Weekly 03/13/24 1545   Non-staged Wound Description Full thickness 03/13/24 1545   Wound Length (cm) 3.5 cm 03/13/24 1545   Wound Width (cm) 6.8 cm 03/13/24 1545   Wound Depth (cm) 0.3 cm 03/13/24 1545   Wound Surface Area (cm^2) 23.8 cm^2 03/13/24 1545   Wound Volume (cm^3) 7.14 cm^3 03/13/24 1545   Post-Procedure Length (cm) 1.7 cm 02/21/24 1600   Post-Procedure Width (cm) 2.5 cm 02/21/24 1600   Post-Procedure Depth (cm) 0.6 cm 02/21/24 1600   Post-Procedure Surface Area (cm^2) 4.25 cm^2 02/21/24 1600   Post-Procedure Volume (cm^3) 2.55 cm^3  "02/21/24 1600   Wound Healing % -198 03/13/24 1545   Tunneling (cm) 0 cm 03/13/24 1545   Undermining (cm) 0 cm 03/13/24 1545   Wound Odor None 03/13/24 1545   Exposed Structures None;Other (Comments) 03/13/24 1545   Number of days: 27       PROCEDURE: Excisional debridement of sacral / coccygeal pressure ulcer and right ischial ulcer  -Curette used to debride wound bed inferior wound. Excisional debridement was performed to remove devitalized tissue until healthy, bleeding tissue was visualized.  Total wound area debrided 13.85 cm².  Tissue debrided into the muscle / fascia layer  -Bleeding controlled with manual pressure.    -Wound care completed by wound RN, refer to flowsheet  -Patient tolerated the procedure well, without c/o pain or discomfort.      Pertinent Labs and Diagnostics:    Labs:     A1c: No results found for: \"HBA1C\"     [facility] results, 7/1/2022 (under media tab)    CRP 13    ESR 31      IMAGING:     X-ray left tib-fib ordered 2/16/2024 through [facility]    12/11/2023-CT of abdomen pelvis with contrast  IMPRESSION:   1.  Right ischial decubitus ulcer extending to bone with soft tissue gas tracking along the right perineum. Appearance suggesting osteomyelitis, consider component of necrotizing fasciitis as clinically appropriate.  2.  Small pericardial effusion  3.  Left adrenal nodule, density on prior noncontrast CT demonstrates adenoma.  4.  Hepatomegaly  5.  Enlarged prostate, workup and evaluation for causes of prostate enlargement recommended as clinically appropriate.  6.  Atherosclerosis and atherosclerotic coronary artery disease    12/15/2023-bone scan of left foot  IMPRESSION:     1.  Mild increased activity in the LEFT 1st and 3rd toes on blood pool and delayed images possibly indicating inflammation/infection.  2.  No significant blood flow asymmetry.          VASCULAR STUDIES: No results found.    LAST  WOUND CULTURE:   Lab Results   Component Value Date/Time    CULTRSULT - " (A) 02/22/2024 03:30 PM    CULTRSULT Klebsiella pneumoniae  >100,000 cfu/mL   (A) 02/22/2024 03:30 PM    CULTRSULT Candida tropicalis  10-50,000 cfu/mL   (A) 02/22/2024 03:30 PM      PATHOLOGY  2/17/2023-bone fragment extracted from left lower extremity wound\\  FINAL DIAGNOSIS:     A. Left leg bone fragment at base of chronic wound:          Extensively degenerated fibrocartilaginous tissue with a rim of           fibrinopurulent debris          Correlate with culture findings            Comment: While no residual intact bone is identified, these           findings are suggestive of adjacent osteomyelitis.      ASSESSMENT AND PLAN:     1. Sacral decubitus ulcer, stage IV ([facility])  Comments: Ulcer first noted in early April 2022 as small open area which quickly enlarged.  This ulcer is present distal from previous sacral ulcer which healed after surgery in January.  Patient has history of flap reconstruction x2 to this area.    3/13/2024: Area has not changed significantly since last assessment, slough to wound bed  - Excisional debridement of ulcer in clinic today, medically necessary to promote wound healing.   -Patient to return to clinic weekly for assessment and debridement  -Home health to change dressing 2 times per week   -Patient does spend a lot of time up in his wheelchair, and wishes to continue to do so for his quality of life.  He lives independently, drives, and is involved with family activities.  He does have an appropriate pressure-relief cushion and is very well versed on pressure relieving techniques.  - Known OM that was previously treated.  CT scan done during recent hospitalization did not show OM of sacrum, however OM of the ischium noted.    Wound care: Silver Hydrofiber to manage exudate and bioburden, sacral foam cover dressing    2.  Pressure injury of right ischium, stage IV  Comments: Abscess and OM found on CT during hospitalization in December 2023.  Patient underwent I&D with VAC  placement.  IV antibiotics through 1/22/2024.    3/13/2024: Wound area has decreased since last assessment, increased granulation tissue noted.  -Excisional debridement of nonviable tissue from wound in clinic today, medically necessary to promote wound healing.  -Home health has been instructed to do Dakin soaked gauze to wound prior to placement of new VAC dressing as needed for odor.  -Continue wound VAC  -Patient to return to clinic weekly for assessment and debridement  -Home health to change dressing 2 times per week in between clinic visits   -Patient is very well versed on pressure relief measures, has adequate surfaces in wheelchair and on his bed.    Wound care:  NPWT at -125 mmHg to accelerate granulation, change 3 times per week, sacral offloading foam.     3. Postoperative wound dehiscence, subsequent encounter  4. Osteomyelitis of left lower extremity  Comments: On 4/26/2022 patient underwent irrigation and debridement of multiple compartments of the left lower extremity states for pressure injury, with bone excision, and complex closure of chronic wound using biologic skin substitute.  During postop visit in surgeons office on 5/11, surgical site was noted to be dehisced.      3/13/2024: Left medial lower extremity wound measures significantly smaller, poor tissue quality.  Posterior wound resolved, scar tissue with discoloration.  Historically these wounds have closed and reopened several times.  Possible osteomyelitis of left foot was noted on bone scan during hospitalization.  Ortho was consulted, did not recommend any surgery.  - Recent x-ray left tib-fib inconclusive  -Patient to return to clinic weekly for assessment and debridement as needed  -Home health to change dressing 2 times per week in between clinic visits  -Patient to be mindful of offloading when supine, should assure that his leg is not rotating medially    Wound care: Hydrofiber silver, silicone foam dressing    5. Deep tissue  injury  6. Pressure injury of left foot, stage IV  Comments: DTI to posterior left lower leg first observed in clinic 7/29/2022.  Patient does not know how it started, had been wearing heel float boot consistently.  Evolved into stage IV pressure injury    3/13/2024: Plantar foot ulcer remains healed.  -No excisional debridement required today.  -High risk for recurrence, has opened and closed several times before  -Monitor site each clinic visit     Care: Open to air    7. Pressure injury of left heel, stage 3 (HCC)  Comments: First noted in clinic on 10/21/2022, originally noted to be stage II    3/13/2024: Remains resolved.  Historically has open and close several times.  -Monitor each clinic visit  -Patient to return to clinic weekly for assessment   Wound care: Prevalon boot    8.  Pressure injury of left leg, stage III    3/13/2024:   - Anterior leg wound remains open. Posterior leg wound has resolved.  Historically waxes and wanes  - No excisional debridement performed today.   Wound Care: Hydrofiber silver, foam    9.  Quadriplegia, C5-C7, incomplete ([facility])  Comments: Complicating factor.  Impaired mobility and sensation  -Patient is still spending 7-8 hours/day up in his wheelchair, though he does have appropriate offloading cushion, and knows to reposition frequently.  Wears heel float boots bilaterally at all times  Wound care: Silver Hydrofiber to manage exudate and bioburden, foam cover dressing, Hypafix tape     Please note that this note may have been created using voice recognition software. I have worked with technical experts from [facility] to optimize the interface.  I have made every reasonable attempt to correct obvious errors, but there may be errors of grammar and possibly content that I did not discover before finalizing the note.

## 2024-03-20 ENCOUNTER — OFFICE VISIT (OUTPATIENT)
Dept: WOUND CARE | Facility: MEDICAL CENTER | Age: 74
End: 2024-03-20
Attending: NURSE PRACTITIONER
Payer: MEDICARE

## 2024-03-20 VITALS
HEART RATE: 71 BPM | DIASTOLIC BLOOD PRESSURE: 84 MMHG | TEMPERATURE: 97.6 F | RESPIRATION RATE: 16 BRPM | SYSTOLIC BLOOD PRESSURE: 120 MMHG | OXYGEN SATURATION: 96 %

## 2024-03-20 DIAGNOSIS — T81.31XD POSTOPERATIVE WOUND DEHISCENCE, SUBSEQUENT ENCOUNTER: ICD-10-CM

## 2024-03-20 DIAGNOSIS — L89.893 PRESSURE INJURY OF LEFT LEG, STAGE 3 (HCC): ICD-10-CM

## 2024-03-20 DIAGNOSIS — T14.8XXA DEEP TISSUE INJURY: ICD-10-CM

## 2024-03-20 DIAGNOSIS — L89.623 PRESSURE INJURY OF LEFT HEEL, STAGE 3 (HCC): ICD-10-CM

## 2024-03-20 DIAGNOSIS — L89.894 PRESSURE INJURY OF LEFT FOOT, STAGE 4 (HCC): ICD-10-CM

## 2024-03-20 DIAGNOSIS — L89.154 SACRAL DECUBITUS ULCER, STAGE IV (HCC): ICD-10-CM

## 2024-03-20 DIAGNOSIS — M86.9 OSTEOMYELITIS OF LEFT LOWER EXTREMITY (HCC): ICD-10-CM

## 2024-03-20 DIAGNOSIS — G82.54 QUADRIPLEGIA, C5-C7 INCOMPLETE (HCC): ICD-10-CM

## 2024-03-20 DIAGNOSIS — L89.314 PRESSURE INJURY OF RIGHT ISCHIUM, STAGE 4 (HCC): ICD-10-CM

## 2024-03-20 PROCEDURE — 3074F SYST BP LT 130 MM HG: CPT | Performed by: STUDENT IN AN ORGANIZED HEALTH CARE EDUCATION/TRAINING PROGRAM

## 2024-03-20 PROCEDURE — 11043 DBRDMT MUSC&/FSCA 1ST 20/<: CPT

## 2024-03-20 PROCEDURE — 11043 DBRDMT MUSC&/FSCA 1ST 20/<: CPT | Performed by: STUDENT IN AN ORGANIZED HEALTH CARE EDUCATION/TRAINING PROGRAM

## 2024-03-20 PROCEDURE — 3079F DIAST BP 80-89 MM HG: CPT | Performed by: STUDENT IN AN ORGANIZED HEALTH CARE EDUCATION/TRAINING PROGRAM

## 2024-03-20 NOTE — PROGRESS NOTES
Provider Encounter- Pressure Injury        HISTORY OF PRESENT ILLNESS  Wound History:    START OF CARE IN CLINIC: 1/31/2024 (return to clinic after hospitalization)    REFERRING PROVIDER: Racquel York       WOUNDS-superior coccyx pressure injury, stage IV                                 Coccyx pressure injury, stage IV           Inferior coccyx pressure injury, stage IV                                 Right ischial pressure injury, stage IV                                 Left heel pressure injury, recurring stage III                                 Left posterior lower leg/calf-stage III                                 Left medial lower leg surgical wound dehiscence-resolved, reopens frequently            HISTORY: Patient with history of incomplete quadriplegia referred to [facility] for treatment of a stage IV pressure injury.  He has a history of previous pressure injuries to this area, and underwent muscle flaps in 2019, and then again in 2020.  He was seen in the wound clinic in November 2021 for an ulcer proximal from his current ulcer, and pressure injuries to his left posterior lower leg and left heel.  At that time, it was discovered that the patient had retained VAC foam embedded in the wound bed of the sacral wound.  Attempts were made to get him back to his plastic surgeon, though unsuccessful.  In January he underwent surgical removal of VAC sponge along with excisional debridement of his sacral wound by Dr. Chaves.  After the surgery, his wound went on to heal without incident.   In early April 2022, his home health nurse noted a new sacral ulcer, below the previous ulcer which quickly tripled in size over the following weeks.  The ulcer to his left medial lower leg had also deteriorated, with bone visible at the base..  He was hospitalized from 4/22 until 4/27/2022 and underwent surgery with Dr. Raman on 4/26 for irrigation and debridement of multiple compartments of the left lower extremity, bone  excision, and complex closure of chronic wound using biologic skin substitute.   His sacrococcygeal wound was not surgically addressed during this admission.  He was discharged back to his group home, with home health, and referral to outpatient wound clinic for his sacral wound.  He was instructed to follow-up with his surgeon for his lower leg wound.       Postoperatively, the left medial lower leg incision dehisced.  He was seen by his surgeon at [facility] on 5/11.  The surgeon opted to leave remaining sutures in place, and refer him to the wound clinic for treatment of this wound.   Treatment of this wound was initiated in clinic on 5/12.  During this visit was also noted that his heel DTI had resolved, but that he had a new pressure injury to his left posterior lower extremity.     A new pressure injury was noted to patient's right upper buttock/lower back on 5/20/2022.  Wound was linear in shape, skin discolored but intact.     Abrasion noted to left anterior lower leg.  First observed in clinic on 7/22/2022.  Patient states he bumped his leg into his food tray.     Small DTI noted to patient's left lateral lower leg on 7/29/2022.  Skin intact but discolored.     Large area of deep tissue injury noted to patient's left exterior lower leg.  Patient denied any trauma to this area.  Skin intact.  Wound documented.    1/27/2023: Patient was admitted to [facility] [place] from 1/23-1/25/2023 with gross hematuria. He underwent RICHARD which showed watchman device was in place and he was taken off of Xarelto. While hospitalized wound team was consulted. He was referred back to [facility] and home health upon discharge.    Patient was hospitalized at [facility] for pyelonephritis from 2/26 until 3/2/2023, admitted for fever and general malaise.  He was admitted and initially started on linezolid and meropenem for suspected UTI and history of multidrug-resistant organisms.  Urine cultures were negative. ID was consulted, recommended CT of chest and  abdomen,which were negative for acute findings. However, he was treated with 5 days total course of antibiotics for suspected UTI, and symptoms completely resolved.  During this admission, the inpatient wound team was consulted for treatment of his sacral and lower leg wounds.  A wound culture was taken from his left heel pressure ulcer, negative.  Once stabilized, he was discharged home and referred back to [facility] to resume treatment of his wounds.    Patient was hospitalized at [facility] from 12/11 until 12/23/2023, admitted for fever.  Wound infection suspected.  CT scan of abdomen and pelvis for evaluation of sacral pressure injury showed gas tracking down to the bone consistent with osteomyelitis.  He underwent I&D of right ischial ulcer (documented as buttock) with Dr. Bansal, medial tract leading to an abscess was identified.  Cavity was opened allowing it to drain into the main wound bed.  Wound VAC was placed and managed by wound team during this admission.  A bone scan of patient's left foot was also done, initially concerning for osteomyelitis.  Orthopedic surgery was consulted and did not recommend surgical intervention. ID consulted also, recommended the patient to receive IV ertapenem 1 g every 24 hours plus IV daptomycin 8 mg/kg every 24 hours through 1/22/2024.   He was discharged to LTAC on 1/23 for IV antibiotics and wound care.  From the LTAC he was discharged home on 1/22 with home health and referral back to [facility] to resume management of his wounds  .      Pertinent Medical History: Incomplete quadriplegia, history of stage IV pressure injuries, history of flap procedures to sacral pressure injuries, osteomyelitis, obesity, colostomy in place   Contributing factors: Immobility and Obesity, impaired sensation    Personal support: Attendant-staff at shelter and home health nursing    TOBACCO USE:   Former smoker, quit in 1977.  Never used smokeless tobacco    Patient's problem list, allergies, and  current medications reviewed and updated in Epic    Interval History:  Interval History thinned 7/29/2022.  Please see previous notes for complete interval history.   Interval History thinned 1/27/2023. Please see previous note for complete interval history.  Interval History thinned 3/3/2023.  Please see previous notes for complete interval history.    Interval History thinned 8/4/2023.  Please see previous notes for complete interval history.    Interval History thinned 1/31/2024.  Please see previous notes for complete interval history.      1/31/2024 Initial clinic visit with ADILSON Arthur, HOLDEN, ALEXN, CFTRACI.   Patient returns to clinic after hospitalization and LTAC stay.  VAC in place to right ischial wound.  Sacral wounds have progressed significantly since he was last seen in clinic.  Left medial wound has healed, though covered with friable tissue.  Left heel ulcer, previously resolved has reopened slightly.  He states that he is feeling well overall, denies fevers, chills, nausea, vomiting, cough or shortness of breath.     2/7/2024: Clinic visit with Steve Hodges MD. Patient reports feeling in normal state of health. Patient denies any alarms from wound VAC, however today he presented with significant odor from ischial wound and dressing was partially avulsed leaving in adequate suction. Machine was checked and functioning well, there were no recorded alarms.    2/16/24: Clinic visit with Dana DE ANDA, HOLDEN, ABBY, CFTRACI.  Pt denies fevers, chills, nausea, vomiting.  Left shin fluctuance to wound with hematoma and dark sanguinous drainage.  Bone fragment removed during debridement.  Obtain x-ray as residual bone palpated.  X-ray ordered through [facility].  Coccyx wound has decreased from 3 ulcers to now 1.  Right ischial wound with slough, odor.  Dakin soaked performed during visit.  Wound debrided.  Able to continue VAC postdebridement.     2/21/2024: Clinic visit with  Steve Hodges MD. Patient reports doing ok, reports feeling well. Left medial lower extremity wound is measuring larger with thick slough and friable tissue. Xray completed today, will undoubtedly demonstrate known OM. Patient denies any issues with wound VAC. Home health has been soaking Ischial wound with Dakins prior to applying wound VAC.    2/28/2024: Clinic visit with Steve Hodges MD. Patient recently tested positive for COVID. Reports some congestion, cough, and brain fog. Denies other symptoms. Patient with new wounds to left lower extremity undoubtedly associated with known underlying OM. Patient's sacral and ischial wound appear to be improving, measuring smaller.    3/6/2024: Clinic visit with Steve Hodges MD. Patient reports feeling much better, COVID symptoms have resolved. Patient's left lower extremity wounds are stable, posterior leg wounds appear resolved. Patient sacral wound is stable and ischial wound is improving.    3/13/2024 : Clinic visit with ADILSON Arthur, FNP-BC, CWDINESHN, CFTRACI.   Patient states he is feeling well, offers no complaints.  Left lower extremity wounds continue to wax and wane.  Sacral and ischial wounds slowly progressing.    3/20/2024: Clinic visit with Steve Hodges MD. Patient reports doing ok. Denies any acute issues. Leg wounds continue to wax and wane. He reports there was more slough and increased odor from ischial wound so home health packed with dakins prior to applying VAC. No odor today in clinic.    REVIEW OF SYSTEMS:   Unchanged from previous wound clinic assessment on 3/13/2024, except as noted in interval history above        PHYSICAL EXAMINATION:   /84   Pulse 71   Temp 36.4 °C (97.6 °F)   Resp 16   SpO2 96%   Physical Exam  Constitutional:       Appearance: He is obese.   Cardiovascular:      Rate and Rhythm: Normal rate.   Pulmonary:      Effort: Pulmonary effort is normal.   Abdominal:      Comments: Colostomy left lower quadrant    Genitourinary:     Comments: Suprapubic catheter to down drain   Skin:     Comments: Stage IV coccygeal pressure injury - previously 3 wounds, decreased to one wound at distal aspect.  Wound area has not changed significantly.  Moderate serosanguineous drainage, slough to wound bed.    Stage IV pressure injury to right ischium- Wound slowly improving, increased granulation tissue, bone no longer palpable.  Moderate serosanguineous drainage, thin layer of slough to wound bed.    Full-thickness wound to left medial lower leg, surgical wound dehiscence- Wound waxes and wanes.  Area has decreased significantly since last assessment, however poor tissue quality persists    Stage III pressure injury left posterior heel - Resolved    Stage IV pressure injury plantar foot -resolved    Stage III pressure injury to posterior left lower leg- Reopened, superficial.     Neurological:      Mental Status: He is alert and oriented to person, place, and time.   Psychiatric:         Mood and Affect: Mood normal.         WOUND ASSESSMENT  Wound 12/12/23 Pressure Injury Ischium Right (Active)   Wound Image    03/20/24 1500   Site Assessment Pink;Red;Yellow 03/20/24 1500   Periwound Assessment Intact;Fragile;Scar tissue 03/20/24 1500   Margins Unattached edges 03/20/24 1500   Drainage Amount Large 03/20/24 1500   Drainage Description Serosanguineous 03/20/24 1500   Treatments Cleansed;Provider debridement 03/20/24 1500   Offloading/DME Other (comment) 03/20/24 1500   Wound Cleansing Hypochlorus Acid 03/20/24 1500   Periwound Protectant Skin Protectant Wipes to Periwound;Drape 03/20/24 1500   Dressing Changed Changed 03/20/24 1500   Dressing Cleansing/Solutions Not Applicable 03/20/24 1500   Dressing Options Wound Vac;Offloading Dressing - Sacral;Other (Comments) 03/20/24 1500   Dressing Change/Treatment Frequency Monday, Wednesday, Friday, and As Needed 03/20/24 1500   Wound Team Following Weekly 03/20/24 1500   WOUND NURSE ONLY -  Pressure Injury Stage 4 03/20/24 1500   Wound Length (cm) 3.5 cm 03/20/24 1500   Wound Width (cm) 3.5 cm 03/20/24 1500   Wound Depth (cm) 2.9 cm 03/20/24 1500   Wound Surface Area (cm^2) 12.25 cm^2 03/20/24 1500   Wound Volume (cm^3) 35.525 cm^3 03/20/24 1500   Post-Procedure Length (cm) 3.6 cm 03/20/24 1500   Post-Procedure Width (cm) 3.5 cm 03/20/24 1500   Post-Procedure Depth (cm) 2.9 cm 03/20/24 1500   Post-Procedure Surface Area (cm^2) 12.6 cm^2 03/20/24 1500   Post-Procedure Volume (cm^3) 36.54 cm^3 03/20/24 1500   Wound Healing % 37 03/20/24 1500   Wound Bed Granulation (%) 100 % 03/06/24 1000   Tunneling (cm) 0 cm 03/20/24 1500   Tunneling Clock Position of Wound 2 03/06/24 1000   Undermining (cm) 4.2 cm 03/20/24 1500   Undermining of Wound, 1st Location From 7 o'clock;To 3 o'clock 03/20/24 1500   Undermining (cm) - 2nd location 2.5 cm 02/16/24 1620   Undermining of Wound, 2nd Location From 7 o'clock;To 11 o'clock 02/16/24 1620   Wound Odor None 03/20/24 1500   Exposed Structures Fascia;Bone 03/20/24 1500   Number of days: 100       Wound 01/31/24 Pressure Injury Coccyx Inferior wound, previosly closed but reopened 1/31/24 (Active)   Wound Image    03/20/24 1500   Site Assessment Red;Pink;Yellow 03/20/24 1500   Periwound Assessment Intact;Fragile;Other (Comment) 03/20/24 1500   Margins Attached edges;Unattached edges 03/20/24 1500   Drainage Amount Moderate 03/20/24 1500   Drainage Description Serosanguineous 03/20/24 1500   Treatments Cleansed;Provider debridement 03/20/24 1500   Wound Cleansing Hypochlorus Acid 03/20/24 1500   Periwound Protectant Skin Protectant Wipes to Periwound;Barrier Paste 03/20/24 1500   Dressing Cleansing/Solutions Normal Saline 03/20/24 1500   Dressing Options Collagen Dressing;Hydrofiber Silver;Offloading Dressing - Sacral;Other (Comments) 03/20/24 1500   Dressing Change/Treatment Frequency Monday, Wednesday, Friday, and As Needed 03/20/24 1500   Wound Team Following Weekly  03/20/24 1500   WOUND NURSE ONLY - Pressure Injury Stage 3 03/20/24 1500   Wound Length (cm) 1 cm 03/20/24 1500   Wound Width (cm) 1.2 cm 03/20/24 1500   Wound Depth (cm) 0.6 cm 03/20/24 1500   Wound Surface Area (cm^2) 1.2 cm^2 03/20/24 1500   Wound Volume (cm^3) 0.72 cm^3 03/20/24 1500   Post-Procedure Length (cm) 1.1 cm 03/20/24 1500   Post-Procedure Width (cm) 1.3 cm 03/20/24 1500   Post-Procedure Depth (cm) 0.6 cm 03/20/24 1500   Post-Procedure Surface Area (cm^2) 1.43 cm^2 03/20/24 1500   Post-Procedure Volume (cm^3) 0.858 cm^3 03/20/24 1500   Wound Healing % -110 03/20/24 1500   Wound Bed Slough (%) 30 % 03/06/24 1000   Tunneling (cm) 0 cm 03/20/24 1500   Undermining (cm) 0 cm 03/20/24 1500   Undermining of Wound, 1st Location From 11 o'clock;To 1 o'clock 03/13/24 1545   Wound Odor None 03/20/24 1500   Exposed Structures None 03/20/24 1500   Number of days: 50       Wound 02/16/24 Full Thickness Wound Leg Medial Left (Active)   Wound Image   03/20/24 1500   Site Assessment Red;Fragile 03/20/24 1500   Periwound Assessment Intact;Fragile 03/20/24 1500   Margins Attached edges 03/20/24 1500   Drainage Amount Moderate 03/20/24 1500   Drainage Description Serosanguineous 03/20/24 1500   Treatments Cleansed 03/20/24 1500   Wound Cleansing Hypochlorus Acid 03/20/24 1500   Periwound Protectant Skin Protectant Wipes to Periwound;Barrier Paste 03/20/24 1500   Dressing Cleansing/Solutions Not Applicable 03/20/24 1500   Dressing Options Hydrofiber Silver;Silicone Adhesive Foam;Tubigrip;Other (Comments) 03/20/24 1500   Dressing Change/Treatment Frequency Monday, Wednesday, Friday, and As Needed 03/20/24 1500   Wound Team Following Weekly 03/20/24 1500   Non-staged Wound Description Full thickness 03/20/24 1500   Wound Length (cm) 3.5 cm 03/20/24 1500   Wound Width (cm) 6 cm 03/20/24 1500   Wound Depth (cm) 0.3 cm 03/20/24 1500   Wound Surface Area (cm^2) 21 cm^2 03/20/24 1500   Wound Volume (cm^3) 6.3 cm^3 03/20/24 1500    Post-Procedure Length (cm) 1.7 cm 02/21/24 1600   Post-Procedure Width (cm) 2.5 cm 02/21/24 1600   Post-Procedure Depth (cm) 0.6 cm 02/21/24 1600   Post-Procedure Surface Area (cm^2) 4.25 cm^2 02/21/24 1600   Post-Procedure Volume (cm^3) 2.55 cm^3 02/21/24 1600   Wound Healing % -163 03/20/24 1500   Tunneling (cm) 0 cm 03/20/24 1500   Undermining (cm) 0 cm 03/20/24 1500   Wound Odor None 03/20/24 1500   Exposed Structures None;Other (Comments) 03/20/24 1500   Number of days: 34       Wound Leg Posterior Left (Active)   Wound Image   03/20/24 1500   Site Assessment Red 03/20/24 1500   Periwound Assessment Intact;Fragile 03/20/24 1500   Margins Attached edges 03/20/24 1500   Closure Other (Comment) 03/20/24 1500   Drainage Amount Moderate 03/20/24 1500   Drainage Description Serosanguineous 03/20/24 1500   Treatments Cleansed 03/20/24 1500   Wound Cleansing Hypochlorus Acid 03/20/24 1500   Periwound Protectant Skin Protectant Wipes to Periwound;Barrier Paste 03/20/24 1500   Dressing Changed New 03/20/24 1500   Dressing Cleansing/Solutions Not Applicable 03/20/24 1500   Dressing Options Hydrofiber Silver;Silicone Adhesive Foam;Tubigrip;Other (Comments) 03/20/24 1500   Dressing Change/Treatment Frequency Monday, Wednesday, Friday, and As Needed 03/20/24 1500   Wound Team Following Weekly 03/20/24 1500   Wound Length (cm) 1.2 cm 03/20/24 1500   Wound Width (cm) 1.8 cm 03/20/24 1500   Wound Depth (cm) 0.1 cm 03/20/24 1500   Wound Surface Area (cm^2) 2.16 cm^2 03/20/24 1500   Wound Volume (cm^3) 0.216 cm^3 03/20/24 1500   Tunneling (cm) 0 cm 03/20/24 1500   Undermining (cm) 0 cm 03/20/24 1500   Wound Odor None 03/20/24 1500   Exposed Structures None 03/20/24 1500   Number of days:        PROCEDURE: Excisional debridement of sacral / coccygeal pressure ulcer and right ischial ulcer  -Curette used to debride wound bed inferior wound. Excisional debridement was performed to remove devitalized tissue until healthy, bleeding  "tissue was visualized.  Total wound area debrided 14.03 cm².  Tissue debrided into the muscle / fascia layer  -Bleeding controlled with manual pressure.    -Wound care completed by wound RN, refer to flowsheet  -Patient tolerated the procedure well, without c/o pain or discomfort.      Pertinent Labs and Diagnostics:    Labs:     A1c: No results found for: \"HBA1C\"     [facility] results, 7/1/2022 (under media tab)    CRP 13    ESR 31      IMAGING:     X-ray left tib-fib ordered 2/16/2024 through [facility]    12/11/2023-CT of abdomen pelvis with contrast  IMPRESSION:   1.  Right ischial decubitus ulcer extending to bone with soft tissue gas tracking along the right perineum. Appearance suggesting osteomyelitis, consider component of necrotizing fasciitis as clinically appropriate.  2.  Small pericardial effusion  3.  Left adrenal nodule, density on prior noncontrast CT demonstrates adenoma.  4.  Hepatomegaly  5.  Enlarged prostate, workup and evaluation for causes of prostate enlargement recommended as clinically appropriate.  6.  Atherosclerosis and atherosclerotic coronary artery disease    12/15/2023-bone scan of left foot  IMPRESSION:     1.  Mild increased activity in the LEFT 1st and 3rd toes on blood pool and delayed images possibly indicating inflammation/infection.  2.  No significant blood flow asymmetry.          VASCULAR STUDIES: No results found.    LAST  WOUND CULTURE:   Lab Results   Component Value Date/Time    CULTRSULT - (A) 02/22/2024 03:30 PM    CULTRSULT Klebsiella pneumoniae  >100,000 cfu/mL   (A) 02/22/2024 03:30 PM    CULTRSULT Candida tropicalis  10-50,000 cfu/mL   (A) 02/22/2024 03:30 PM      PATHOLOGY  2/17/2023-bone fragment extracted from left lower extremity wound\\  FINAL DIAGNOSIS:     A. Left leg bone fragment at base of chronic wound:          Extensively degenerated fibrocartilaginous tissue with a rim of           fibrinopurulent debris          Correlate with culture " findings            Comment: While no residual intact bone is identified, these           findings are suggestive of adjacent osteomyelitis.      ASSESSMENT AND PLAN:     1. Sacral decubitus ulcer, stage IV ([facility])  Comments: Ulcer first noted in early April 2022 as small open area which quickly enlarged.  This ulcer is present distal from previous sacral ulcer which healed after surgery in January.  Patient has history of flap reconstruction x2 to this area.    3/20/2024: Wound stable.  - Excisional debridement of ulcer in clinic today, medically necessary to promote wound healing.  -Patient to return to clinic weekly for assessment and debridement  -Home health to change dressing 2 times per week   -Patient does spend a lot of time up in his wheelchair, and wishes to continue to do so for his quality of life.  He lives independently, drives, and is involved with family activities.  He does have an appropriate pressure-relief cushion and is very well versed on pressure relieving techniques.  - Known OM that was previously treated.  CT scan done during recent hospitalization did not show OM of sacrum, however OM of the ischium noted.    Wound care: Silver Hydrofiber to manage exudate and bioburden, sacral foam cover dressing    2.  Pressure injury of right ischium, stage IV  Comments: Abscess and OM found on CT during hospitalization in December 2023.  Patient underwent I&D with VAC placement.  IV antibiotics through 1/22/2024.    3/20/2024: Wound measuring about the same. Increased tissue formation and bone no longer exposed.  -Excisional debridement of nonviable tissue from wound in clinic today, medically necessary to promote wound healing.  -Home health has been instructed to do Dakin soaked gauze to wound prior to placement of new VAC dressing as needed for odor / slough.  -Continue wound VAC  -Patient to return to clinic weekly for assessment and debridement  -Home health to change dressing 2 times per week in  between clinic visits   -Patient is very well versed on pressure relief measures, has adequate surfaces in wheelchair and on his bed.    Wound care:  NPWT at -125 mmHg to accelerate granulation, change 3 times per week, sacral offloading foam.     3. Postoperative wound dehiscence, subsequent encounter  4. Osteomyelitis of left lower extremity  Comments: On 4/26/2022 patient underwent irrigation and debridement of multiple compartments of the left lower extremity states for pressure injury, with bone excision, and complex closure of chronic wound using biologic skin substitute.  During postop visit in surgeons office on 5/11, surgical site was noted to be dehisced.      3/20/2024: Left medial lower extremity wounds stable, poor tissue quality.  Posterior wound reoccurred. Wounds wax and wane with known OM.  Historically these wounds have closed and reopened several times.  Possible osteomyelitis of left foot was noted on bone scan during hospitalization.  Ortho was consulted, did not recommend any surgery.  - X-ray left tib-fib inconclusive  -Patient to return to clinic weekly for assessment and debridement as needed  -Home health to change dressing 2 times per week in between clinic visits  -Patient to be mindful of offloading when supine, should assure that his leg is not rotating medially    Wound care: Hydrofiber silver, silicone foam dressing    5. Deep tissue injury  6. Pressure injury of left foot, stage IV  Comments: DTI to posterior left lower leg first observed in clinic 7/29/2022.  Patient does not know how it started, had been wearing heel float boot consistently.  Evolved into stage IV pressure injury    3/20/2024: Plantar foot ulcer remains healed.  -High risk for recurrence, has opened and closed several times before  -Monitor site each clinic visit     Care: Open to air    7. Pressure injury of left heel, stage 3 ([facility])  Comments: First noted in clinic on 10/21/2022, originally noted to be stage  II    3/20/2024: Remains resolved.  Historically has open and close several times.  -Monitor each clinic visit  -Patient to return to clinic weekly for assessment   Wound care: Prevalon boot    8.  Pressure injury of left leg, stage III    3/20/2024:   - Anterior leg wound remains open. Posterior leg wound has reoccurred.  Historically waxes and wanes  - No excisional debridement performed today.   Wound Care: Hydrofiber silver, foam    9.  Quadriplegia, C5-C7, incomplete (HCC)  Comments: Complicating factor.  Impaired mobility and sensation  -Patient is still spending 7-8 hours/day up in his wheelchair, though he does have appropriate offloading cushion, and knows to reposition frequently.  Wears heel float boots bilaterally at all times  Wound care: Silver Hydrofiber to manage exudate and bioburden, foam cover dressing, Hypafix tape     Please note that this note may have been created using voice recognition software. I have worked with technical experts from [facility] to optimize the interface.  I have made every reasonable attempt to correct obvious errors, but there may be errors of grammar and possibly content that I did not discover before finalizing the note.

## 2024-03-20 NOTE — PATIENT INSTRUCTIONS
-Keep your wound dressing clean, dry, and intact. Only change dressing if it's over saturated, soiled or falls off.     -Wound vac may not have any drainage in tube or cannister & it will still be working.   Change cannister if it does become full by pressing tab on side of machine to remove canister and snap on new one. Full canister can be thrown in the trash. If cannister fills with bright red blood - go to ER. Dressing will be changed every MWF at the wound clini.  If you are having issues with your wound VAC, please consider patching leaks, changing the canister, or calling [phone] for troubleshooting. If the wound VAC has been off or un-operational for over 2 hours, call wound care center to inform them and remove all dressings including black foam and replace with normal saline damp gauze. You may also call  for assistance with wound vac problems.    -Should you experience any significant changes in your wound(s), such as infection (redness, swelling, localized heat, increased pain, fever > 101 F, chills) or have any questions regarding your home care instructions, please contact the wound center at [phone]. If after hours, contact your primary care physician or go to the hospital emergency room.

## 2024-03-26 NOTE — PROGRESS NOTES
"Arrhythmia Clinic Note (Established patient)    DOS: 12/29/2022    Chief complaint/Reason for consult: AF/AFL    Interval History: 73 y/o M with pAF/AFL, ILR in situ, intolerance to OAC now s/p TOMI-C 5 weeks ago, no complaints.    ROS (+ highlighted in bold):  Constitutional: Fevers/chills/fatigue/weightloss  HEENT: Blurry vision/eye pain/sore throat/hearing loss  Respiratory: Shortness of breath/cough  Cardiovascular: Chest pain/palpitations/edema/orthopnea/syncope  GI: Nausea/vomitting/diarrhea  MSK: Arthralgias/myagias/muscle weakness  Skin: Rash/sores  Neurological: Numbness/tremors/vertigo  Endocrine: Excessive thirst/polyuria/cold intolerance/heat intolerance  Psych: Depression/anxiety    Past Medical History:   Diagnosis Date    A-fib ([facility])     Acute cystitis without hematuria 10/23/2021    Atrial flutter ([facility])     Blood clotting disorder ([facility])     Patient is on Xarelto    Bowel habit changes     Colostomy    GERD (gastroesophageal reflux disease)     Hypertension     Kidney stones     Neurogenic bladder     S/P cath    Open wound 11/18/2022    sacrum with wound vac, left ankle, [facility]    Quadriplegia, C5-C7 complete ([facility])     patient reports \" incomplete quad\"    Suprapubic catheter ([facility])        Past Surgical History:   Procedure Laterality Date    IRRIGATION & DEBRIDEMENT GENERAL Left 4/26/2022    Procedure: IRRIGATION AND DEBRIDEMENT, WOUND - LEG;  Surgeon: Eric Raman M.D.;  Location: [facility];  Service: Orthopedics    WOUND CLOSURE NEURO Left 4/26/2022    Procedure: CLOSURE, WOUND;  Surgeon: Eric Raman M.D.;  Location: [facility];  Service: Orthopedics    ORTHOPEDIC OSTEOTOMY Left 4/26/2022    Procedure: OSTECTOMY;  Surgeon: Eric Raman M.D.;  Location: [facility];  Service: Orthopedics    INCISION AND DRAINAGE ORTHOPEDIC Left 1/27/2022    Procedure: INCISION AND DRAINAGE, WOUND, BY ORTHOPEDICS;  Surgeon: Erasmo Stewart M.D.;  Location: " [facility];  Service: Orthopedics    BONE BIOPSY Left 1/27/2022    Procedure: BIOPSY, BONE;  Surgeon: Erasmo Stewart M.D.;  Location: [facility];  Service: Orthopedics    INCISION AND DRAINAGE ORTHOPEDIC  1/22/2022    Procedure: INCISION AND DRAINAGE, WOUND, BY ORTHOPEDICS;  Surgeon: Erasmo Stewart M.D.;  Location: [facility];  Service: Orthopedics    PA CYSTOSCOPY,INSERT URETERAL STENT Left 1/4/2022    Procedure: CYSTOSCOPY, WITH URETERAL STENT INSERTION;  Surgeon: Osvaldo Baltazar M.D.;  Location: [facility];  Service: Urology    PA CYSTO/URETERO/PYELOSCOPY, DX Left 1/4/2022    Procedure: URETEROSCOPY;  Surgeon: Osvaldo Baltazar M.D.;  Location: [facility];  Service: Urology    LASERTRIPSY N/A 1/4/2022    Procedure: LITHOTRIPSY, USING LASER;  Surgeon: Osvaldo Baltazar M.D.;  Location: [facility];  Service: Urology    PA CYSTOSCOPY,INSERT URETERAL STENT Left 12/16/2021    Procedure: CYSTOSCOPY, WITH URETERAL STENT INSERTION;  Surgeon: Aly Bowen M.D.;  Location: [facility];  Service: Urology    PA CYSTO/URETERO/PYELOSCOPY, DX Left 12/16/2021    Procedure: URETEROSCOPY;  Surgeon: Aly Bowen M.D.;  Location: [facility];  Service: Urology    LASERTRIPSY Left 12/16/2021    Procedure: LITHOTRIPSY, USING LASER;  Surgeon: Aly Bowen M.D.;  Location: [facility];  Service: Urology    IRRIGATION & DEBRIDEMENT GENERAL  12/20/2020    Procedure: IRRIGATION AND DEBRIDEMENT, WOUND SACRAL ULCER;  Surgeon: Matt Cummins M.D.;  Location: [facility];  Service: Plastics    ULCER DEBRIDEMENT N/A 8/21/2019    Procedure: debridement of Sacral grade 4 ulcer - W/BONE BIOPSY, 3 liter wash out. bilateral sliding gluteal myocutaneous flap advancement;  Surgeon: Amadeo Moon M.D.;  Location: [facility];  Service: Plastics    FLAP CLOSURE  8/21/2019    Procedure: CLOSURE, FLAP - MUSCLE;   "Surgeon: Amadeo Moon M.D.;  Location: [facility];  Service: Plastics    COLOSTOMY N/A 2019    Procedure: CREATION, COLOSTOMY -  placement;  Surgeon: Elías Hannah M.D.;  Location: [facility];  Service: General    COLOSTOMY      COLOSTOMY TAKEDOWN      HERNIA REPAIR      PERCUTANEOUOSPINNING LOWER EXTREMITY         Social History     Socioeconomic History    Marital status:      Spouse name: Not on file    Number of children: Not on file    Years of education: Not on file    Highest education level: Not on file   Occupational History    Not on file   Tobacco Use    Smoking status: Former     Packs/day: 1.00     Years: 10.00     Pack years: 10.00     Types: Cigarettes     Start date: 1967     Quit date: 1977     Years since quittin.0    Smokeless tobacco: Never   Vaping Use    Vaping Use: Never used   Substance and Sexual Activity    Alcohol use: Yes     Alcohol/week: 0.6 oz     Types: 1 Standard drinks or equivalent per week     Comment: Nightly    Drug use: No    Sexual activity: Not on file   Other Topics Concern    Not on file   Social History Narrative    Not on file     Social Determinants of Health     Financial Resource Strain: Not on file   Food Insecurity: Not on file   Transportation Needs: Not on file   Physical Activity: Not on file   Stress: Not on file   Social Connections: Not on file   Intimate Partner Violence: Not on file   Housing Stability: Not on file       Family History   Problem Relation Age of Onset    Heart Disease Father        Allergies   Allergen Reactions    Sulfa Drugs Rash     Rash, developed this back in  after being placed on \"sulfa antibiotic for my wound\". Antibiotic was stopped and rash went away. Patient states he had a sulfa antibiotic prior to that time back when he was younger w/o a reaction.          Current Outpatient Medications   Medication Sig Dispense Refill    aspirin EC 81 MG EC tablet Take 1 Tablet by " "mouth every day. 30 Tablet 6    rivaroxaban (XARELTO) 20 MG Tab tablet Take 1 Tablet by mouth with dinner. 90 Tablet 3    baclofen (LIORESAL) 20 MG tablet Take one tab po QID (Patient taking differently: Take 20 mg by mouth 4 times a day. Take one tab po QID) 360 Tablet 3    Nutritional Supplements (ARGINAID) Pack One pack po BID 56 Each 6    amLODIPine (NORVASC) 10 MG Tab Take 1 Tablet by mouth every morning. Hold if BP <100/64.      Zinc 50 MG Tab Take 1 Tablet by mouth every morning.      Cholecalciferol (VITAMIN D3) 2000 UNIT Cap Take 1 Capsule by mouth every morning.      losartan (COZAAR) 50 MG Tab Take 1 Tablet by mouth at bedtime. Hold if BP <100/64.      Magnesium 400 MG Tab Take 400 mg by mouth every morning.      Ascorbic Acid (VITAMIN C) 1000 MG Tab Take 1 Tablet by mouth every morning.      melatonin 5 mg Tab Take 2 Tablets by mouth at bedtime. 2 tablets = 10 mg.      Probiotic Product (PROBIOTIC PO) Take 1 Capsule by mouth every morning.      sennosides (SENOKOT) 8.6 MG Tab Take 2 Tablets by mouth 2 times a day. 2 tablets = 17.2 mg.      ferrous sulfate 325 (65 Fe) MG tablet Take 1 Tablet by mouth 2 times a day.      docusate sodium (COLACE) 100 MG Cap Take 2 Capsules by mouth 2 times a day. 2 capsules = 200 mg. Hold for loose stool.       No current facility-administered medications for this visit.       Physical Exam:  Vitals:    12/29/22 1245   BP: 126/82   BP Location: Left arm   Patient Position: Sitting   BP Cuff Size: Adult   Pulse: (!) 57   Resp: 14   SpO2: 97%   Weight: 108 kg (238 lb 1.6 oz)   Height: 1.778 m (5' 10\")     General appearance: NAD, conversant   Eyes: anicteric sclerae, moist conjunctivae; no lid-lag; PERRLA  HENT: Atraumatic; oropharynx clear with moist mucous membranes and no mucosal ulcerations; normal hard and soft palate  Neck: Trachea midline; FROM, supple, no thyromegaly or lymphadenopathy  Lungs: CTA, with normal respiratory effort and no intercostal retractions  CV: " RRR, no MRGs, no JVD  Abdomen: Soft, non-tender; no masses or HSM  Extremities: No peripheral edema or extremity lymphadenopathy  Skin: Normal temperature, turgor and texture; no rash, ulcers or subcutaneous nodules  Psych: Appropriate affect, alert and oriented to person, place and time    Data:  Lipids:   No results found for: CHOLSTRLTOT, TRIGLYCERIDE, HDL, LDL     BMP:  Lab Results   Component Value Date/Time    SODIUM 140 11/18/2022 1158    POTASSIUM 4.3 11/18/2022 1158    CHLORIDE 106 11/18/2022 1158    CO2 23 11/18/2022 1158    GLUCOSE 93 11/18/2022 1158    BUN 13 11/18/2022 1158    CREATININE 0.53 11/18/2022 1158    CALCIUM 9.3 11/18/2022 1158    ANION 11.0 11/18/2022 1158        TSH:   Lab Results   Component Value Date/Time    TSHULTRASEN 0.350 (L) 12/09/2019 1129        THYROXINE (T4):   No results found for: ILA     CBC:   Lab Results   Component Value Date/Time    WBC 7.1 11/22/2022 01:16 PM    RBC 3.95 (L) 11/22/2022 01:16 PM    HEMOGLOBIN 13.9 (L) 11/22/2022 01:16 PM    HEMATOCRIT 40.6 (L) 11/22/2022 01:16 PM    .8 (H) 11/22/2022 01:16 PM    MCH 35.2 (H) 11/22/2022 01:16 PM    MCHC 34.2 11/22/2022 01:16 PM    RDW 50.2 (H) 11/22/2022 01:16 PM    PLATELETCT 138 (L) 11/22/2022 01:16 PM    MPV 9.1 11/22/2022 01:16 PM    NEUTSPOLYS 62.90 11/18/2022 11:58 AM    LYMPHOCYTES 22.00 11/18/2022 11:58 AM    MONOCYTES 10.90 11/18/2022 11:58 AM    EOSINOPHILS 3.20 11/18/2022 11:58 AM    BASOPHILS 0.40 11/18/2022 11:58 AM    IMMGRAN 0.60 11/18/2022 11:58 AM    NRBC 0.00 11/18/2022 11:58 AM    NEUTS 3.35 11/18/2022 11:58 AM    LYMPHS 1.17 11/18/2022 11:58 AM    LYMPHS 2 12/08/2019 05:35 PM    MONOS 0.58 11/18/2022 11:58 AM    EOS 0.17 11/18/2022 11:58 AM    BASO 0.02 11/18/2022 11:58 AM    IMMGRANAB 0.03 11/18/2022 11:58 AM    NRBCAB 0.00 11/18/2022 11:58 AM        CBC w/o DIFF  Lab Results   Component Value Date/Time    WBC 7.1 11/22/2022 01:16 PM    RBC 3.95 (L) 11/22/2022 01:16 PM    HEMOGLOBIN 13.9 (L)  11/22/2022 01:16 PM    .8 (H) 11/22/2022 01:16 PM    MCH 35.2 (H) 11/22/2022 01:16 PM    MCHC 34.2 11/22/2022 01:16 PM    RDW 50.2 (H) 11/22/2022 01:16 PM    MPV 9.1 11/22/2022 01:16 PM       Prior echo/stress reviewed: RICHARD shows watchman in situ    EKG interpreted by me: SR    ILR interrogation shows 1.6% pAF/AFL    Impression/Plan:  1. Atypical atrial flutter (HCC)  EKG        Afib  Atrial flutter  Intolerance to OAC  TOMI-C in situ  Hypercoagulable state due to afib    - We will set up his RICHARD and if no significant leak he will come off Xarelto and stay on ASA 81  - 1.6% burden AF/AFL will monitor asymptomatic for now    FV annual    Elías Merino MD  Cardiac Electrophysiology     PAD s/p L pSFA stent/L-TIFFANY stent with occluded b/l SFA followed by L-internal iliac artery stent in 5/2021 c/b L-RP hematoma requiring 2 units PRBC with ongoing LLE claudication.  -Home med: cilostazol 50mg bid    Plan:  - Continue home med

## 2024-03-27 ENCOUNTER — TELEPHONE (OUTPATIENT)
Dept: CARDIOLOGY | Facility: MEDICAL CENTER | Age: 74
End: 2024-03-27

## 2024-03-27 ENCOUNTER — OFFICE VISIT (OUTPATIENT)
Dept: WOUND CARE | Facility: MEDICAL CENTER | Age: 74
End: 2024-03-27
Attending: PHYSICIAN ASSISTANT
Payer: MEDICARE

## 2024-03-27 VITALS
SYSTOLIC BLOOD PRESSURE: 128 MMHG | DIASTOLIC BLOOD PRESSURE: 66 MMHG | OXYGEN SATURATION: 96 % | HEART RATE: 84 BPM | RESPIRATION RATE: 18 BRPM | TEMPERATURE: 98.6 F

## 2024-03-27 DIAGNOSIS — L89.154 SACRAL DECUBITUS ULCER, STAGE IV (HCC): Primary | ICD-10-CM

## 2024-03-27 DIAGNOSIS — G82.54 QUADRIPLEGIA, C5-C7 INCOMPLETE (HCC): ICD-10-CM

## 2024-03-27 DIAGNOSIS — L89.894 PRESSURE INJURY OF LEFT FOOT, STAGE 4 (HCC): ICD-10-CM

## 2024-03-27 DIAGNOSIS — T14.8XXA DEEP TISSUE INJURY: ICD-10-CM

## 2024-03-27 DIAGNOSIS — L89.893 PRESSURE INJURY OF LEFT LEG, STAGE 3 (HCC): ICD-10-CM

## 2024-03-27 DIAGNOSIS — T81.31XD POSTOPERATIVE WOUND DEHISCENCE, SUBSEQUENT ENCOUNTER: ICD-10-CM

## 2024-03-27 DIAGNOSIS — M86.9 OSTEOMYELITIS OF LEFT LOWER EXTREMITY (HCC): ICD-10-CM

## 2024-03-27 DIAGNOSIS — L89.623 PRESSURE INJURY OF LEFT HEEL, STAGE 3 (HCC): ICD-10-CM

## 2024-03-27 DIAGNOSIS — L89.314 PRESSURE INJURY OF RIGHT ISCHIUM, STAGE 4 (HCC): ICD-10-CM

## 2024-03-27 PROCEDURE — 11043 DBRDMT MUSC&/FSCA 1ST 20/<: CPT

## 2024-03-27 PROCEDURE — 3074F SYST BP LT 130 MM HG: CPT | Performed by: STUDENT IN AN ORGANIZED HEALTH CARE EDUCATION/TRAINING PROGRAM

## 2024-03-27 PROCEDURE — 11043 DBRDMT MUSC&/FSCA 1ST 20/<: CPT | Performed by: STUDENT IN AN ORGANIZED HEALTH CARE EDUCATION/TRAINING PROGRAM

## 2024-03-27 PROCEDURE — 97605 NEG PRS WND THER DME<=50SQCM: CPT

## 2024-03-27 PROCEDURE — 3078F DIAST BP <80 MM HG: CPT | Performed by: STUDENT IN AN ORGANIZED HEALTH CARE EDUCATION/TRAINING PROGRAM

## 2024-03-27 NOTE — TELEPHONE ENCOUNTER
MT  Caller: Osvaldo Pate     Topic/issue: Patient states [employer] states he is no longer  patient that is being monitored through there company. Patient states the monitor is beeping randomly and loudly , it beeps three times.  Patient would like to know what he is to do with this monitor.    Callback Number: [phone]     Thank you  Maira HOWARD

## 2024-03-27 NOTE — PROCEDURES
Wound vac removed by RN inspected by this RN, no retained foam noted. Wound vac <50cm applied to right ischium wound using 2 pieces of black foam. 1 piece of black foam to fill undermining, 1 piece of black foam to fill remainder of wound and to bridge from wound to right hip.  Negative pressure wound therapy resumed at 125mmHg continuous pressure with no leaks noted.   Small sacral  offloading dressing to wound bed, Large sacral offloading dressing fenestrated and placed around wound vac track pad.

## 2024-03-27 NOTE — TELEPHONE ENCOUNTER
Called patient back and advised his device is at [employer] and that is why his home monitor is beeping. Pt would like to know if we should replace or if okay to no longer monitor.

## 2024-03-27 NOTE — PROGRESS NOTES
Provider Encounter- Pressure Injury        HISTORY OF PRESENT ILLNESS  Wound History:    START OF CARE IN CLINIC: 1/31/2024 (return to clinic after hospitalization)    REFERRING PROVIDER: Racquel York       WOUNDS-superior coccyx pressure injury, stage IV                                 Coccyx pressure injury, stage IV           Inferior coccyx pressure injury, stage IV                                 Right ischial pressure injury, stage IV                                 Left heel pressure injury, recurring stage III                                 Left posterior lower leg/calf-stage III                                 Left medial lower leg surgical wound dehiscence-resolved, reopens frequently            HISTORY: Patient with history of incomplete quadriplegia referred to [facility] for treatment of a stage IV pressure injury.  He has a history of previous pressure injuries to this area, and underwent muscle flaps in 2019, and then again in 2020.  He was seen in the wound clinic in November 2021 for an ulcer proximal from his current ulcer, and pressure injuries to his left posterior lower leg and left heel.  At that time, it was discovered that the patient had retained VAC foam embedded in the wound bed of the sacral wound.  Attempts were made to get him back to his plastic surgeon, though unsuccessful.  In January he underwent surgical removal of VAC sponge along with excisional debridement of his sacral wound by Dr. Chaves.  After the surgery, his wound went on to heal without incident.   In early April 2022, his home health nurse noted a new sacral ulcer, below the previous ulcer which quickly tripled in size over the following weeks.  The ulcer to his left medial lower leg had also deteriorated, with bone visible at the base..  He was hospitalized from 4/22 until 4/27/2022 and underwent surgery with Dr. Raman on 4/26 for irrigation and debridement of multiple compartments of the left lower extremity, bone  excision, and complex closure of chronic wound using biologic skin substitute.   His sacrococcygeal wound was not surgically addressed during this admission.  He was discharged back to his group home, with home health, and referral to outpatient wound clinic for his sacral wound.  He was instructed to follow-up with his surgeon for his lower leg wound.       Postoperatively, the left medial lower leg incision dehisced.  He was seen by his surgeon at [facility] on 5/11.  The surgeon opted to leave remaining sutures in place, and refer him to the wound clinic for treatment of this wound.   Treatment of this wound was initiated in clinic on 5/12.  During this visit was also noted that his heel DTI had resolved, but that he had a new pressure injury to his left posterior lower extremity.     A new pressure injury was noted to patient's right upper buttock/lower back on 5/20/2022.  Wound was linear in shape, skin discolored but intact.     Abrasion noted to left anterior lower leg.  First observed in clinic on 7/22/2022.  Patient states he bumped his leg into his food tray.     Small DTI noted to patient's left lateral lower leg on 7/29/2022.  Skin intact but discolored.     Large area of deep tissue injury noted to patient's left exterior lower leg.  Patient denied any trauma to this area.  Skin intact.  Wound documented.    1/27/2023: Patient was admitted to [facility] [place] 1/23-1/25/2023 with gross hematuria. He underwent RICHARD which showed watchman device was in place and he was taken off of Xarelto. While hospitalized wound team was consulted. He was referred back to [facility] and home health upon discharge.    Patient was hospitalized at [facility] for pyelonephritis from 2/26 until 3/2/2023, admitted for fever and general malaise.  He was admitted and initially started on linezolid and meropenem for suspected UTI and history of multidrug-resistant organisms.  Urine cultures were negative. ID was consulted, recommended CT of chest and  abdomen,which were negative for acute findings. However, he was treated with 5 days total course of antibiotics for suspected UTI, and symptoms completely resolved.  During this admission, the inpatient wound team was consulted for treatment of his sacral and lower leg wounds.  A wound culture was taken from his left heel pressure ulcer, negative.  Once stabilized, he was discharged home and referred back to [facility] to resume treatment of his wounds.    Patient was hospitalized at [facility] from 12/11 until 12/23/2023, admitted for fever.  Wound infection suspected.  CT scan of abdomen and pelvis for evaluation of sacral pressure injury showed gas tracking down to the bone consistent with osteomyelitis.  He underwent I&D of right ischial ulcer (documented as buttock) with Dr. Bansal, medial tract leading to an abscess was identified.  Cavity was opened allowing it to drain into the main wound bed.  Wound VAC was placed and managed by wound team during this admission.  A bone scan of patient's left foot was also done, initially concerning for osteomyelitis.  Orthopedic surgery was consulted and did not recommend surgical intervention. ID consulted also, recommended the patient to receive IV ertapenem 1 g every 24 hours plus IV daptomycin 8 mg/kg every 24 hours through 1/22/2024.   He was discharged to LTAC on 1/23 for IV antibiotics and wound care.  From the LTAC he was discharged home on 1/22 with home health and referral back to [facility] to resume management of his wounds  .      Pertinent Medical History: Incomplete quadriplegia, history of stage IV pressure injuries, history of flap procedures to sacral pressure injuries, osteomyelitis, obesity, colostomy in place   Contributing factors: Immobility and Obesity, impaired sensation    Personal support: Attendant-staff at assisted and home health nursing    TOBACCO USE:   Former smoker, quit in 1977.  Never used smokeless tobacco    Patient's problem list, allergies, and  current medications reviewed and updated in Epic    Interval History:  Interval History thinned 7/29/2022.  Please see previous notes for complete interval history.   Interval History thinned 1/27/2023. Please see previous note for complete interval history.  Interval History thinned 3/3/2023.  Please see previous notes for complete interval history.    Interval History thinned 8/4/2023.  Please see previous notes for complete interval history.    Interval History thinned 1/31/2024.  Please see previous notes for complete interval history.      1/31/2024 Initial clinic visit with ADILSON Arthur, HOLDEN, ALEXN, CFTRACI.   Patient returns to clinic after hospitalization and LTAC stay.  VAC in place to right ischial wound.  Sacral wounds have progressed significantly since he was last seen in clinic.  Left medial wound has healed, though covered with friable tissue.  Left heel ulcer, previously resolved has reopened slightly.  He states that he is feeling well overall, denies fevers, chills, nausea, vomiting, cough or shortness of breath.     2/7/2024: Clinic visit with Steve Hodges MD. Patient reports feeling in normal state of health. Patient denies any alarms from wound VAC, however today he presented with significant odor from ischial wound and dressing was partially avulsed leaving in adequate suction. Machine was checked and functioning well, there were no recorded alarms.    2/16/24: Clinic visit with Dana DE ANDA, HOLDEN, ABBY, CFTRACI.  Pt denies fevers, chills, nausea, vomiting.  Left shin fluctuance to wound with hematoma and dark sanguinous drainage.  Bone fragment removed during debridement.  Obtain x-ray as residual bone palpated.  X-ray ordered through [facility].  Coccyx wound has decreased from 3 ulcers to now 1.  Right ischial wound with slough, odor.  Dakin soaked performed during visit.  Wound debrided.  Able to continue VAC postdebridement.     2/21/2024: Clinic visit with  Steve Hodges MD. Patient reports doing ok, reports feeling well. Left medial lower extremity wound is measuring larger with thick slough and friable tissue. Xray completed today, will undoubtedly demonstrate known OM. Patient denies any issues with wound VAC. Home health has been soaking Ischial wound with Dakins prior to applying wound VAC.    2/28/2024: Clinic visit with Steve Hodges MD. Patient recently tested positive for COVID. Reports some congestion, cough, and brain fog. Denies other symptoms. Patient with new wounds to left lower extremity undoubtedly associated with known underlying OM. Patient's sacral and ischial wound appear to be improving, measuring smaller.    3/6/2024: Clinic visit with Steve Hodges MD. Patient reports feeling much better, COVID symptoms have resolved. Patient's left lower extremity wounds are stable, posterior leg wounds appear resolved. Patient sacral wound is stable and ischial wound is improving.    3/13/2024 : Clinic visit with ADILSON Arthur, FNP-BC, CWDINESHN, CFTRACI.   Patient states he is feeling well, offers no complaints.  Left lower extremity wounds continue to wax and wane.  Sacral and ischial wounds slowly progressing.    3/20/2024: Clinic visit with Steve Hodges MD. Patient reports doing ok. Denies any acute issues. Leg wounds continue to wax and wane. He reports there was more slough and increased odor from ischial wound so home health packed with dakins prior to applying VAC. No odor today in clinic.    3/27/2024: Clinic visit with Steve Hodges MD. Patient reports doing ok. Unfortunately home health did not pre-drape bridging the trac pad from wound VAC and right buttocks / hip skin is irritated and at risk of breaking down. Left leg wounds have improved. Sacral and ischial pressure injuries are stable.    REVIEW OF SYSTEMS:   Unchanged from previous wound clinic assessment on 3/20/2024, except as noted in interval history above        PHYSICAL  EXAMINATION:   /66   Pulse 84   Temp 37 °C (98.6 °F) (Temporal)   Resp 18   SpO2 96%   Physical Exam  Constitutional:       Appearance: He is obese.   Cardiovascular:      Rate and Rhythm: Normal rate.   Pulmonary:      Effort: Pulmonary effort is normal.   Abdominal:      Comments: Colostomy left lower quadrant   Genitourinary:     Comments: Suprapubic catheter to down drain   Skin:     Comments: Stage IV coccygeal pressure injury - previously 3 wounds, decreased to one wound at distal aspect.  Wound area has not changed significantly.  Moderate serosanguineous drainage, slough to wound bed.    Stage IV pressure injury to right ischium- Wound slowly improving, increased granulation tissue, bone no longer palpable. Periwound skin break down due to home health not pre-draping trac pad.  Moderate serosanguineous drainage, thin layer of slough to wound bed.    Full-thickness wound to left medial lower leg, surgical wound dehiscence- Wound waxes and wanes.  Wound has improved and nearly resolved, though underlying tissue quality remains poor.    Stage III pressure injury left posterior heel - Resolved    Stage IV pressure injury plantar foot -resolved    Stage III pressure injury to posterior left lower leg- Improving     Neurological:      Mental Status: He is alert and oriented to person, place, and time.   Psychiatric:         Mood and Affect: Mood normal.         WOUND ASSESSMENT  Wound 12/12/23 Pressure Injury Ischium Right (Active)   Wound Image      03/27/24 1625   Site Assessment Red;Yellow 03/27/24 1625   Periwound Assessment Irritation 03/27/24 1625   Margins Unattached edges 03/27/24 1625   Drainage Amount Large 03/27/24 1625   Drainage Description Serosanguineous 03/27/24 1625   Treatments Cleansed;Provider debridement;Site care 03/27/24 1625   Offloading/DME Other (comment) 03/27/24 1625   Wound Cleansing Hypochlorus Acid 03/27/24 1625   Periwound Protectant Hydrocolloid;Drape 03/27/24 1625    Dressing Changed Changed 03/20/24 1500   Dressing Cleansing/Solutions Not Applicable 03/27/24 1625   Dressing Options Wound Vac;Offloading Dressing - Sacral 03/27/24 1625   Dressing Change/Treatment Frequency Monday, Wednesday, Friday, and As Needed 03/27/24 1625   Wound Team Following Weekly 03/20/24 1500   WOUND NURSE ONLY - Pressure Injury Stage 4 03/27/24 1625   Wound Length (cm) 3.3 cm 03/27/24 1625   Wound Width (cm) 3.4 cm 03/27/24 1625   Wound Depth (cm) 2.5 cm 03/27/24 1625   Wound Surface Area (cm^2) 11.22 cm^2 03/27/24 1625   Wound Volume (cm^3) 28.05 cm^3 03/27/24 1625   Post-Procedure Length (cm) 3.3 cm 03/27/24 1625   Post-Procedure Width (cm) 3.4 cm 03/27/24 1625   Post-Procedure Depth (cm) 2.6 cm 03/27/24 1625   Post-Procedure Surface Area (cm^2) 11.22 cm^2 03/27/24 1625   Post-Procedure Volume (cm^3) 29.172 cm^3 03/27/24 1625   Wound Healing % 50 03/27/24 1625   Wound Bed Granulation (%) 100 % 03/06/24 1000   Tunneling (cm) 0 cm 03/27/24 1625   Tunneling Clock Position of Wound 2 03/06/24 1000   Undermining (cm) 4 cm 03/27/24 1625   Undermining of Wound, 1st Location From 7 o'clock;To 3 o'clock 03/27/24 1625   Undermining (cm) - 2nd location 2.5 cm 02/16/24 1620   Undermining of Wound, 2nd Location From 7 o'clock;To 11 o'clock 02/16/24 1620   Wound Odor None 03/27/24 1625   Exposed Structures Fascia;Bone 03/27/24 1625   Number of days: 106       Wound 01/31/24 Pressure Injury Coccyx Inferior wound, previosly closed but reopened 1/31/24 (Active)   Wound Image    03/27/24 1625   Site Assessment Pink;Red 03/27/24 1625   Periwound Assessment Maceration;Fragile 03/27/24 1625   Margins Attached edges;Unattached edges 03/27/24 1625   Drainage Amount Moderate 03/27/24 1625   Drainage Description Serosanguineous 03/27/24 1625   Treatments Cleansed;Provider debridement 03/27/24 1625   Wound Cleansing Hypochlorus Acid 03/27/24 1625   Periwound Protectant Skin Protectant Wipes to Periwound;Barrier Paste  03/27/24 1625   Dressing Cleansing/Solutions Not Applicable 03/27/24 1625   Dressing Options Hydrofiber Silver;Offloading Dressing - Sacral 03/27/24 1625   Dressing Change/Treatment Frequency Monday, Wednesday, Friday, and As Needed 03/27/24 1625   Wound Team Following Weekly 03/20/24 1500   WOUND NURSE ONLY - Pressure Injury Stage 3 03/27/24 1625   Wound Length (cm) 1.1 cm 03/27/24 1625   Wound Width (cm) 1.2 cm 03/27/24 1625   Wound Depth (cm) 0.7 cm 03/27/24 1625   Wound Surface Area (cm^2) 1.32 cm^2 03/27/24 1625   Wound Volume (cm^3) 0.924 cm^3 03/27/24 1625   Post-Procedure Length (cm) 1.1 cm 03/27/24 1625   Post-Procedure Width (cm) 1.3 cm 03/27/24 1625   Post-Procedure Depth (cm) 0.7 cm 03/27/24 1625   Post-Procedure Surface Area (cm^2) 1.43 cm^2 03/27/24 1625   Post-Procedure Volume (cm^3) 1.001 cm^3 03/27/24 1625   Wound Healing % -169 03/27/24 1625   Wound Bed Slough (%) 30 % 03/06/24 1000   Tunneling (cm) 0 cm 03/27/24 1625   Undermining (cm) 0 cm 03/27/24 1625   Undermining of Wound, 1st Location From 11 o'clock;To 1 o'clock 03/13/24 1545   Wound Odor None 03/27/24 1625   Exposed Structures None 03/27/24 1625   Number of days: 56       Wound 02/16/24 Full Thickness Wound Leg Medial Left (Active)   Wound Image   03/27/24 1625   Site Assessment Dry;Scabbed;Fragile 03/27/24 1625   Periwound Assessment Scar tissue;Fragile 03/27/24 1625   Margins Defined edges 03/27/24 1625   Drainage Amount Scant 03/27/24 1625   Drainage Description Serosanguineous 03/27/24 1625   Treatments Cleansed;Site care 03/27/24 1625   Wound Cleansing Foam Cleanser/Washcloth 03/27/24 1625   Periwound Protectant Skin Protectant Wipes to Periwound;Barrier Paste;Skin Moisturizer 03/27/24 1625   Dressing Cleansing/Solutions Not Applicable 03/27/24 1625   Dressing Options Silicone Adhesive Foam;Tubigrip 03/27/24 1625   Dressing Change/Treatment Frequency Monday, Wednesday, Friday, and As Needed 03/27/24 1625   Wound Team Following Weekly  03/20/24 1500   Non-staged Wound Description Not applicable 03/27/24 1625   Wound Length (cm) 3.5 cm 03/20/24 1500   Wound Width (cm) 6 cm 03/20/24 1500   Wound Depth (cm) 0.3 cm 03/20/24 1500   Wound Surface Area (cm^2) 21 cm^2 03/20/24 1500   Wound Volume (cm^3) 6.3 cm^3 03/20/24 1500   Post-Procedure Length (cm) 1.7 cm 02/21/24 1600   Post-Procedure Width (cm) 2.5 cm 02/21/24 1600   Post-Procedure Depth (cm) 0.6 cm 02/21/24 1600   Post-Procedure Surface Area (cm^2) 4.25 cm^2 02/21/24 1600   Post-Procedure Volume (cm^3) 2.55 cm^3 02/21/24 1600   Wound Healing % -163 03/20/24 1500   Tunneling (cm) 0 cm 03/27/24 1625   Undermining (cm) 0 cm 03/27/24 1625   Wound Odor None 03/27/24 1625   Exposed Structures None 03/27/24 1625   Number of days: 40       Wound 03/20/24 Full Thickness Wound Leg Posterior Left (Active)   Wound Image   03/27/24 1625   Site Assessment Dry;Scabbed 03/27/24 1625   Periwound Assessment Scar tissue;Fragile 03/27/24 1625   Margins Defined edges 03/27/24 1625   Closure Other (Comment) 03/20/24 1500   Drainage Amount Scant 03/27/24 1625   Drainage Description Serosanguineous 03/27/24 1625   Treatments Cleansed;Site care 03/27/24 1625   Wound Cleansing Foam Cleanser/Washcloth 03/27/24 1625   Periwound Protectant Skin Protectant Wipes to Periwound 03/27/24 1625   Dressing Changed New 03/20/24 1500   Dressing Cleansing/Solutions Not Applicable 03/27/24 1625   Dressing Options Silicone Adhesive Foam;Tubigrip 03/27/24 1625   Dressing Change/Treatment Frequency Monday, Wednesday, Friday, and As Needed 03/27/24 1625   Wound Team Following Weekly 03/20/24 1500   Non-staged Wound Description Not applicable 03/27/24 1625   Wound Length (cm) 1.2 cm 03/20/24 1500   Wound Width (cm) 1.8 cm 03/20/24 1500   Wound Depth (cm) 0.1 cm 03/20/24 1500   Wound Surface Area (cm^2) 2.16 cm^2 03/20/24 1500   Wound Volume (cm^3) 0.216 cm^3 03/20/24 1500   Tunneling (cm) 0 cm 03/27/24 1625   Undermining (cm) 0 cm 03/27/24  "1625   Wound Odor None 03/27/24 1625   Exposed Structures None 03/27/24 1625   Number of days: 7       PROCEDURE: Excisional debridement of sacral / coccygeal pressure ulcer and right ischial ulcer  -Curette used to debride wound bed inferior wound. Excisional debridement was performed to remove devitalized tissue until healthy, bleeding tissue was visualized.  Total wound area debrided 12.65 cm².  Tissue debrided into the muscle / fascia layer  -Bleeding controlled with manual pressure.    -Wound care completed by wound RN, refer to flowsheet  -Patient tolerated the procedure well, without c/o pain or discomfort.      Pertinent Labs and Diagnostics:    Labs:     A1c: No results found for: \"HBA1C\"     [facility] results, 7/1/2022 (under media tab)    CRP 13    ESR 31      IMAGING:     X-ray left tib-fib ordered 2/16/2024 through [facility]    12/11/2023-CT of abdomen pelvis with contrast  IMPRESSION:   1.  Right ischial decubitus ulcer extending to bone with soft tissue gas tracking along the right perineum. Appearance suggesting osteomyelitis, consider component of necrotizing fasciitis as clinically appropriate.  2.  Small pericardial effusion  3.  Left adrenal nodule, density on prior noncontrast CT demonstrates adenoma.  4.  Hepatomegaly  5.  Enlarged prostate, workup and evaluation for causes of prostate enlargement recommended as clinically appropriate.  6.  Atherosclerosis and atherosclerotic coronary artery disease    12/15/2023-bone scan of left foot  IMPRESSION:     1.  Mild increased activity in the LEFT 1st and 3rd toes on blood pool and delayed images possibly indicating inflammation/infection.  2.  No significant blood flow asymmetry.          VASCULAR STUDIES: No results found.    LAST  WOUND CULTURE:   Lab Results   Component Value Date/Time    CULTRSULT - (A) 02/22/2024 03:30 PM    CULTRSULT Klebsiella pneumoniae  >100,000 cfu/mL   (A) 02/22/2024 03:30 PM    CULTRSULT Candida " tropicalis  10-50,000 cfu/mL   (A) 02/22/2024 03:30 PM      PATHOLOGY  2/17/2023-bone fragment extracted from left lower extremity wound\\  FINAL DIAGNOSIS:     A. Left leg bone fragment at base of chronic wound:          Extensively degenerated fibrocartilaginous tissue with a rim of           fibrinopurulent debris          Correlate with culture findings            Comment: While no residual intact bone is identified, these           findings are suggestive of adjacent osteomyelitis.      ASSESSMENT AND PLAN:     1. Sacral decubitus ulcer, stage IV ([facility])  Comments: Ulcer first noted in early April 2022 as small open area which quickly enlarged.  This ulcer is present distal from previous sacral ulcer which healed after surgery in January.  Patient has history of flap reconstruction x2 to this area.    3/27/2024: Wound stable  - Excisional debridement of ulcer in clinic today, medically necessary to promote wound healing.  -Patient to return to clinic weekly for assessment and debridement  -Home health to change dressing 2 times per week   -Patient does spend a lot of time up in his wheelchair, and wishes to continue to do so for his quality of life.  He lives independently, drives, and is involved with family activities.  He does have an appropriate pressure-relief cushion and is very well versed on pressure relieving techniques.  - Known OM that was previously treated.  CT scan done during recent hospitalization did not show OM of sacrum, however OM of the ischium noted.    Wound care: Silver Hydrofiber to manage exudate and bioburden, sacral foam cover dressing    2.  Pressure injury of right ischium, stage IV  Comments: Abscess and OM found on CT during hospitalization in December 2023.  Patient underwent I&D with VAC placement.  IV antibiotics through 1/22/2024.    3/27/2024: Wound slowly improved, slight necrotic tissue. Bone no longer exposed. Periwound irritated as they did not pre-drape trac  pad.  -Excisional debridement of nonviable tissue from wound in clinic today, medically necessary to promote wound healing.  -Home health has been instructed to do Dakin soaked gauze to wound prior to placement of new VAC dressing as needed for odor / slough.  -Continue wound VAC  -Patient to return to clinic weekly for assessment and debridement  -Home health to change dressing 2 times per week in between clinic visits   -Patient is very well versed on pressure relief measures, has adequate surfaces in wheelchair and on his bed.    Wound care:  NPWT at -125 mmHg to accelerate granulation, change 3 times per week, sacral offloading foam.   Wound care: Hydrocolloid over irritated skin from trac pad.    3. Postoperative wound dehiscence, subsequent encounter  4. Osteomyelitis of left lower extremity  Comments: On 4/26/2022 patient underwent irrigation and debridement of multiple compartments of the left lower extremity states for pressure injury, with bone excision, and complex closure of chronic wound using biologic skin substitute.  During postop visit in surgeons office on 5/11, surgical site was noted to be dehisced.      3/27/2024: Left medial lower extremity wounds have improved. Wounds wax and wane with known OM.  Historically these wounds have closed and reopened several times.  Possible osteomyelitis of left foot was noted on bone scan during hospitalization.  Ortho was consulted, did not recommend any surgery.  - X-ray left tib-fib inconclusive  -Patient to return to clinic weekly for assessment and debridement as needed  -Home health to change dressing 2 times per week in between clinic visits  -Patient to be mindful of offloading when supine, should assure that his leg is not rotating medially    Wound care: Hydrofiber silver, silicone foam dressing    5. Deep tissue injury  6. Pressure injury of left foot, stage IV  Comments: DTI to posterior left lower leg first observed in clinic 7/29/2022.  Patient  does not know how it started, had been wearing heel float boot consistently.  Evolved into stage IV pressure injury    3/27/2024: Plantar foot ulcer remains healed.  -High risk for recurrence, has opened and closed several times before  -Monitor site each clinic visit     Care: Open to air    7. Pressure injury of left heel, stage 3 (HCC)  Comments: First noted in clinic on 10/21/2022, originally noted to be stage II    3/27/2024: Remains resolved.  Historically has open and close several times.  -Monitor each clinic visit  -Patient to return to clinic weekly for assessment   Wound care: Prevalon boot    8.  Pressure injury of left leg, stage III    3/27/2024:   - Improving.  Historically waxes and wanes  - No excisional debridement performed today.   Wound Care: Hydrofiber silver, foam    9.  Quadriplegia, C5-C7, incomplete (HCC)  Comments: Complicating factor.  Impaired mobility and sensation  -Patient is still spending 7-8 hours/day up in his wheelchair, though he does have appropriate offloading cushion, and knows to reposition frequently.  Wears heel float boots bilaterally at all times  Wound care: Silver Hydrofiber to manage exudate and bioburden, foam cover dressing, Hypafix tape     Please note that this note may have been created using voice recognition software. I have worked with technical experts from [employer] to optimize the interface.  I have made every reasonable attempt to correct obvious errors, but there may be errors of grammar and possibly content that I did not discover before finalizing the note.

## 2024-03-28 NOTE — PATIENT INSTRUCTIONS
-Wound vac may not have any drainage in tube or cannister & it will still be working.   Change cannister if it does become full by pressing tab on side of machine to remove canister and snap on new one. Full canister can be thrown in the trash. If cannister fills with bright red blood - go to ER. Dressing will be changed every MWF at the wound clini.  If you are having issues with your wound VAC, please consider patching leaks, changing the canister, or calling [phone] for troubleshooting. If the wound VAC has been off or un-operational for over 2 hours, call wound care center to inform them and remove all dressings including black foam and replace with normal saline damp gauze.     -Should you experience any significant changes in your wound(s), such as infection (redness, swelling, localized heat, increased pain, fever > 101 F, chills) or have any questions regarding your home care instructions, please contact the wound center at [phone]. If after hours, contact your primary care physician or go to the hospital emergency room.

## 2024-04-03 ENCOUNTER — OFFICE VISIT (OUTPATIENT)
Dept: WOUND CARE | Facility: MEDICAL CENTER | Age: 74
End: 2024-04-03
Attending: STUDENT IN AN ORGANIZED HEALTH CARE EDUCATION/TRAINING PROGRAM
Payer: MEDICARE

## 2024-04-03 VITALS
SYSTOLIC BLOOD PRESSURE: 118 MMHG | HEART RATE: 77 BPM | TEMPERATURE: 98 F | RESPIRATION RATE: 18 BRPM | DIASTOLIC BLOOD PRESSURE: 78 MMHG | OXYGEN SATURATION: 92 %

## 2024-04-03 DIAGNOSIS — G82.54 QUADRIPLEGIA, C5-C7 INCOMPLETE (HCC): ICD-10-CM

## 2024-04-03 DIAGNOSIS — L89.623 PRESSURE INJURY OF LEFT HEEL, STAGE 3 (HCC): ICD-10-CM

## 2024-04-03 DIAGNOSIS — L89.314 PRESSURE INJURY OF RIGHT ISCHIUM, STAGE 4 (HCC): ICD-10-CM

## 2024-04-03 DIAGNOSIS — M86.9 OSTEOMYELITIS OF LEFT LOWER EXTREMITY (HCC): ICD-10-CM

## 2024-04-03 DIAGNOSIS — L89.154 SACRAL DECUBITUS ULCER, STAGE IV (HCC): Primary | ICD-10-CM

## 2024-04-03 DIAGNOSIS — T14.8XXA DEEP TISSUE INJURY: ICD-10-CM

## 2024-04-03 DIAGNOSIS — L89.894 PRESSURE INJURY OF LEFT FOOT, STAGE 4 (HCC): ICD-10-CM

## 2024-04-03 DIAGNOSIS — T81.31XD POSTOPERATIVE WOUND DEHISCENCE, SUBSEQUENT ENCOUNTER: ICD-10-CM

## 2024-04-03 DIAGNOSIS — L89.893 PRESSURE INJURY OF LEFT LEG, STAGE 3 (HCC): ICD-10-CM

## 2024-04-03 PROCEDURE — 3074F SYST BP LT 130 MM HG: CPT | Performed by: STUDENT IN AN ORGANIZED HEALTH CARE EDUCATION/TRAINING PROGRAM

## 2024-04-03 PROCEDURE — 3078F DIAST BP <80 MM HG: CPT | Performed by: STUDENT IN AN ORGANIZED HEALTH CARE EDUCATION/TRAINING PROGRAM

## 2024-04-03 PROCEDURE — 97605 NEG PRS WND THER DME<=50SQCM: CPT

## 2024-04-03 PROCEDURE — 11043 DBRDMT MUSC&/FSCA 1ST 20/<: CPT | Performed by: STUDENT IN AN ORGANIZED HEALTH CARE EDUCATION/TRAINING PROGRAM

## 2024-04-03 PROCEDURE — 11043 DBRDMT MUSC&/FSCA 1ST 20/<: CPT

## 2024-04-03 NOTE — PATIENT INSTRUCTIONS
-Keep dressings clean and dry. Change dressings every 3-4 days, and if they become over saturated, soiled or fall off.     -If you need to change your dressings at home: Wash your wound with normal saline, wound cleanser, or unscented soap and water prior to applying your new dressings. Please avoid cleansing with hydrogen peroxide or rubbing alcohol.    -Avoid prolonged sitting.    -Remove your compression garments if you have severe pain, severe swelling, numbness, color change, or temperature change in your toes. If you need to remove your compression garments, do so by unrolling them. Do not cut the compression garments off, this is to prevent cutting yourself on accident.    -Should you experience any significant changes in your wound(s), such as signs of infection (increasing redness, swelling, localized heat, increased pain, fever > 101 F, chills) or have any questions regarding your home care instructions, please contact the wound center at [phone]. If after hours, contact your primary care physician or go to the hospital emergency room.     -If you are 5 or more minutes late for an appointment, we reserve the right to cancel and reschedule that appointment. Additionally, if you are habitually late or not showing (3 late cancellations and/or no shows), we reserve the right to cancel your remaining appointments and it will be your responsibility to obtain a new referral if services are still needed.

## 2024-04-03 NOTE — PROGRESS NOTES
Provider Encounter- Pressure Injury        HISTORY OF PRESENT ILLNESS  Wound History:    START OF CARE IN CLINIC: 1/31/2024 (return to clinic after hospitalization)    REFERRING PROVIDER: Racquel York       WOUNDS-superior coccyx pressure injury, stage IV                                 Coccyx pressure injury, stage IV           Inferior coccyx pressure injury, stage IV                                 Right ischial pressure injury, stage IV                                 Left heel pressure injury, recurring stage III                                 Left posterior lower leg/calf-stage III                                 Left medial lower leg surgical wound dehiscence-resolved, reopens frequently            HISTORY: Patient with history of incomplete quadriplegia referred to [facility] for treatment of a stage IV pressure injury.  He has a history of previous pressure injuries to this area, and underwent muscle flaps in 2019, and then again in 2020.  He was seen in the wound clinic in November 2021 for an ulcer proximal from his current ulcer, and pressure injuries to his left posterior lower leg and left heel.  At that time, it was discovered that the patient had retained VAC foam embedded in the wound bed of the sacral wound.  Attempts were made to get him back to his plastic surgeon, though unsuccessful.  In January he underwent surgical removal of VAC sponge along with excisional debridement of his sacral wound by Dr. Chaves.  After the surgery, his wound went on to heal without incident.   In early April 2022, his home health nurse noted a new sacral ulcer, below the previous ulcer which quickly tripled in size over the following weeks.  The ulcer to his left medial lower leg had also deteriorated, with bone visible at the base..  He was hospitalized from 4/22 until 4/27/2022 and underwent surgery with Dr. Raman on 4/26 for irrigation and debridement of multiple compartments of the left lower extremity, bone  excision, and complex closure of chronic wound using biologic skin substitute.   His sacrococcygeal wound was not surgically addressed during this admission.  He was discharged back to his group home, with home health, and referral to outpatient wound clinic for his sacral wound.  He was instructed to follow-up with his surgeon for his lower leg wound.       Postoperatively, the left medial lower leg incision dehisced.  He was seen by his surgeon at [facility] on 5/11.  The surgeon opted to leave remaining sutures in place, and refer him to the wound clinic for treatment of this wound.   Treatment of this wound was initiated in clinic on 5/12.  During this visit was also noted that his heel DTI had resolved, but that he had a new pressure injury to his left posterior lower extremity.     A new pressure injury was noted to patient's right upper buttock/lower back on 5/20/2022.  Wound was linear in shape, skin discolored but intact.     Abrasion noted to left anterior lower leg.  First observed in clinic on 7/22/2022.  Patient states he bumped his leg into his food tray.     Small DTI noted to patient's left lateral lower leg on 7/29/2022.  Skin intact but discolored.     Large area of deep tissue injury noted to patient's left exterior lower leg.  Patient denied any trauma to this area.  Skin intact.  Wound documented.    1/27/2023: Patient was admitted to [facility] [place]23[place]1/25/2023 with gross hematuria. He underwent RICHARD which showed watchman device was in place and he was taken off of Xarelto. While hospitalized wound team was consulted. He was referred back to [facility] and home health upon discharge.    Patient was hospitalized at [facility] for pyelonephritis from 2/26 until 3/2/2023, admitted for fever and general malaise.  He was admitted and initially started on linezolid and meropenem for suspected UTI and history of multidrug-resistant organisms.  Urine cultures were negative. ID was consulted, recommended CT of chest and  abdomen,which were negative for acute findings. However, he was treated with 5 days total course of antibiotics for suspected UTI, and symptoms completely resolved.  During this admission, the inpatient wound team was consulted for treatment of his sacral and lower leg wounds.  A wound culture was taken from his left heel pressure ulcer, negative.  Once stabilized, he was discharged home and referred back to [facility] to resume treatment of his wounds.    Patient was hospitalized at [facility] from 12/11 until 12/23/2023, admitted for fever.  Wound infection suspected.  CT scan of abdomen and pelvis for evaluation of sacral pressure injury showed gas tracking down to the bone consistent with osteomyelitis.  He underwent I&D of right ischial ulcer (documented as buttock) with Dr. Bansal, medial tract leading to an abscess was identified.  Cavity was opened allowing it to drain into the main wound bed.  Wound VAC was placed and managed by wound team during this admission.  A bone scan of patient's left foot was also done, initially concerning for osteomyelitis.  Orthopedic surgery was consulted and did not recommend surgical intervention. ID consulted also, recommended the patient to receive IV ertapenem 1 g every 24 hours plus IV daptomycin 8 mg/kg every 24 hours through 1/22/2024.   He was discharged to LTAC on 1/23 for IV antibiotics and wound care.  From the LTAC he was discharged home on 1/22 with home health and referral back to [facility] to resume management of his wounds  .      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  Never used smokeless tobacco    Patient's problem list, allergies, and  current medications reviewed and updated in Epic    Interval History:  Interval History thinned 7/29/2022.  Please see previous notes for complete interval history.   Interval History thinned 1/27/2023. Please see previous note for complete interval history.  Interval History thinned 3/3/2023.  Please see previous notes for complete interval history.    Interval History thinned 8/4/2023.  Please see previous notes for complete interval history.    Interval History thinned 1/31/2024.  Please see previous notes for complete interval history.      1/31/2024 Initial clinic visit with ADILSON Arthru, HOLDEN, ALEXN, CFTRACI.   Patient returns to clinic after hospitalization and LTAC stay.  VAC in place to right ischial wound.  Sacral wounds have progressed significantly since he was last seen in clinic.  Left medial wound has healed, though covered with friable tissue.  Left heel ulcer, previously resolved has reopened slightly.  He states that he is feeling well overall, denies fevers, chills, nausea, vomiting, cough or shortness of breath.     2/7/2024: Clinic visit with Steve Hodges MD. Patient reports feeling in normal state of health. Patient denies any alarms from wound VAC, however today he presented with significant odor from ischial wound and dressing was partially avulsed leaving in adequate suction. Machine was checked and functioning well, there were no recorded alarms.    2/16/24: Clinic visit with Dana DE ANDA, HOLDEN, ABBY, CFTRACI.  Pt denies fevers, chills, nausea, vomiting.  Left shin fluctuance to wound with hematoma and dark sanguinous drainage.  Bone fragment removed during debridement.  Obtain x-ray as residual bone palpated.  X-ray ordered through [facility].  Coccyx wound has decreased from 3 ulcers to now 1.  Right ischial wound with slough, odor.  Dakin soaked performed during visit.  Wound debrided.  Able to continue VAC postdebridement.     2/21/2024: Clinic visit with  Steve Hodges MD. Patient reports doing ok, reports feeling well. Left medial lower extremity wound is measuring larger with thick slough and friable tissue. Xray completed today, will undoubtedly demonstrate known OM. Patient denies any issues with wound VAC. Home health has been soaking Ischial wound with Dakins prior to applying wound VAC.    2/28/2024: Clinic visit with Steve Hodges MD. Patient recently tested positive for COVID. Reports some congestion, cough, and brain fog. Denies other symptoms. Patient with new wounds to left lower extremity undoubtedly associated with known underlying OM. Patient's sacral and ischial wound appear to be improving, measuring smaller.    3/6/2024: Clinic visit with Steve Hodges MD. Patient reports feeling much better, COVID symptoms have resolved. Patient's left lower extremity wounds are stable, posterior leg wounds appear resolved. Patient sacral wound is stable and ischial wound is improving.    3/13/2024 : Clinic visit with ADILSON Arthur, FNP-BC, CWDINESHN, CFTRACI.   Patient states he is feeling well, offers no complaints.  Left lower extremity wounds continue to wax and wane.  Sacral and ischial wounds slowly progressing.    3/20/2024: Clinic visit with Steve Hodges MD. Patient reports doing ok. Denies any acute issues. Leg wounds continue to wax and wane. He reports there was more slough and increased odor from ischial wound so home health packed with dakins prior to applying VAC. No odor today in clinic.    3/27/2024: Clinic visit with Steve Hodges MD. Patient reports doing ok. Unfortunately home health did not pre-drape bridging the trac pad from wound VAC and right buttocks / hip skin is irritated and at risk of breaking down. Left leg wounds have improved. Sacral and ischial pressure injuries are stable.    4/3/2024: Clinic visit with Steve Hodges MD. Patient reports doing well, denies any acute issues. Leg wounds are all improving, few new pinpoint  areas of skin breakdown. Sacral and ischial pressure injuries slightly improved. Skin on right buttocks and hip has improved and did not fully breakdown.    REVIEW OF SYSTEMS:   Unchanged from previous wound clinic assessment on 3/27/2024, except as noted in interval history above        PHYSICAL EXAMINATION:   /78   Pulse 77   Temp 36.7 °C (98 °F) (Temporal)   Resp 18   SpO2 92%   Physical Exam  Constitutional:       Appearance: He is obese.   Cardiovascular:      Rate and Rhythm: Normal rate.   Pulmonary:      Effort: Pulmonary effort is normal.   Abdominal:      Comments: Colostomy left lower quadrant   Genitourinary:     Comments: Suprapubic catheter to down drain   Skin:     Comments: Stage IV coccygeal pressure injury - previously 3 wounds, decreased to one wound at distal aspect.  Wound slightly smaller, thin layer of tissue over bone.  Moderate serosanguineous drainage, slough to wound bed.    Stage IV pressure injury to right ischium- Wound slightly smaller, increased granulation tissue, bone no longer palpable.  Periwound skin improved with pre-draping. Moderate serosanguineous drainage, thin layer of slough to wound bed.    Full-thickness wound to left medial lower leg, surgical wound dehiscence- Wound waxes and wanes. Wound has improved and nearly resolved, though underlying tissue quality remains poor.    Stage III pressure injury left posterior heel - Resolved    Stage IV pressure injury plantar foot -resolved    Stage III pressure injury to posterior left lower leg- Improving     Neurological:      Mental Status: He is alert and oriented to person, place, and time.   Psychiatric:         Mood and Affect: Mood normal.         WOUND ASSESSMENT  Wound 12/12/23 Pressure Injury Ischium Right (Active)   Wound Image     04/03/24 1616   Site Assessment Red;Yellow 04/03/24 1616   Periwound Assessment Intact 04/03/24 1616   Margins Unattached edges 04/03/24 1616   Drainage Amount Large 04/03/24 1616    Drainage Description Serosanguineous 04/03/24 1616   Treatments Cleansed;Provider debridement 04/03/24 1616   Offloading/DME Other (comment) 03/27/24 1625   Wound Cleansing Hypochlorus Acid 04/03/24 1616   Periwound Protectant Skin Protectant Wipes to Periwound;Drape 04/03/24 1616   Dressing Changed Changed 03/20/24 1500   Dressing Cleansing/Solutions Not Applicable 04/03/24 1616   Dressing Options Wound Vac;Offloading Dressing - Sacral 04/03/24 1616   Dressing Change/Treatment Frequency Monday, Wednesday, Friday, and As Needed 04/03/24 1616   Wound Team Following Weekly 03/20/24 1500   WOUND NURSE ONLY - Pressure Injury Stage 4 04/03/24 1616   Wound Length (cm) 2.6 cm 04/03/24 1616   Wound Width (cm) 3.1 cm 04/03/24 1616   Wound Depth (cm) 1.4 cm 04/03/24 1616   Wound Surface Area (cm^2) 8.06 cm^2 04/03/24 1616   Wound Volume (cm^3) 11.284 cm^3 04/03/24 1616   Post-Procedure Length (cm) 2.7 cm 04/03/24 1616   Post-Procedure Width (cm) 3.2 cm 04/03/24 1616   Post-Procedure Depth (cm) 1.4 cm 04/03/24 1616   Post-Procedure Surface Area (cm^2) 8.64 cm^2 04/03/24 1616   Post-Procedure Volume (cm^3) 12.096 cm^3 04/03/24 1616   Wound Healing % 80 04/03/24 1616   Wound Bed Granulation (%) 100 % 03/06/24 1000   Tunneling (cm) 0 cm 03/27/24 1625   Tunneling Clock Position of Wound 2 03/06/24 1000   Undermining (cm) 4.1 cm 04/03/24 1616   Undermining of Wound, 1st Location From 7 o'clock;To 11 o'clock 04/03/24 1616   Undermining (cm) - 2nd location 2.5 cm 02/16/24 1620   Undermining of Wound, 2nd Location From 7 o'clock;To 11 o'clock 02/16/24 1620   Wound Odor Mild 04/03/24 1616   Exposed Structures Fascia 04/03/24 1616   Number of days: 113       Wound 01/31/24 Pressure Injury Coccyx Inferior wound, previosly closed but reopened 1/31/24 (Active)   Wound Image    04/03/24 1616   Site Assessment Red;Yellow 04/03/24 1616   Periwound Assessment Maceration;Fragile 04/03/24 1616   Margins Attached edges 04/03/24 1616   Drainage  Amount Moderate 04/03/24 1616   Drainage Description Serosanguineous 04/03/24 1616   Treatments Cleansed;Provider debridement 04/03/24 1616   Wound Cleansing Normal Saline Irrigation 04/03/24 1616   Periwound Protectant Skin Protectant Wipes to Periwound;Barrier Paste 04/03/24 1616   Dressing Cleansing/Solutions Not Applicable 04/03/24 1616   Dressing Options Hydrofiber Silver;Offloading Dressing - Sacral 04/03/24 1616   Dressing Change/Treatment Frequency Monday, Wednesday, Friday, and As Needed 04/03/24 1616   Wound Team Following Weekly 03/20/24 1500   WOUND NURSE ONLY - Pressure Injury Stage 3 04/03/24 1616   Wound Length (cm) 1 cm 04/03/24 1616   Wound Width (cm) 0.6 cm 04/03/24 1616   Wound Depth (cm) 0.5 cm 04/03/24 1616   Wound Surface Area (cm^2) 0.6 cm^2 04/03/24 1616   Wound Volume (cm^3) 0.3 cm^3 04/03/24 1616   Post-Procedure Length (cm) 1.1 cm 04/03/24 1616   Post-Procedure Width (cm) 0.6 cm 04/03/24 1616   Post-Procedure Depth (cm) 0.5 cm 04/03/24 1616   Post-Procedure Surface Area (cm^2) 0.66 cm^2 04/03/24 1616   Post-Procedure Volume (cm^3) 0.33 cm^3 04/03/24 1616   Wound Healing % 13 04/03/24 1616   Wound Bed Slough (%) 30 % 03/06/24 1000   Tunneling (cm) 0 cm 04/03/24 1616   Undermining (cm) 0 cm 04/03/24 1616   Undermining of Wound, 1st Location From 11 o'clock;To 1 o'clock 03/13/24 1545   Wound Odor None 04/03/24 1616   Exposed Structures None 04/03/24 1616   Number of days: 63       Wound 02/16/24 Full Thickness Wound Leg Medial Left (Active)   Wound Image    04/03/24 1616   Site Assessment Red;Dry 04/03/24 1616   Periwound Assessment Scar tissue;Fragile;Edema 04/03/24 1616   Margins Attached edges 04/03/24 1616   Drainage Amount Moderate 04/03/24 1616   Drainage Description Serosanguineous 04/03/24 1616   Treatments Cleansed;Site care 04/03/24 1616   Wound Cleansing Foam Cleanser/Washcloth 04/03/24 1616   Periwound Protectant Skin Protectant Wipes to Periwound;Barrier Paste;Skin Moisturizer  04/03/24 1616   Dressing Cleansing/Solutions Not Applicable 04/03/24 1616   Dressing Options Hydrofiber Silver;Offloading Dressing - Heel;Offloading Dressing - Sacral;Tubigrip 04/03/24 1616   Dressing Change/Treatment Frequency Monday, Wednesday, Friday, and As Needed 04/03/24 1616   Wound Team Following Weekly 03/20/24 1500   Non-staged Wound Description Full thickness 04/03/24 1616   Wound Length (cm) 3.5 cm 03/20/24 1500   Wound Width (cm) 6 cm 03/20/24 1500   Wound Depth (cm) 0.3 cm 03/20/24 1500   Wound Surface Area (cm^2) 21 cm^2 03/20/24 1500   Wound Volume (cm^3) 6.3 cm^3 03/20/24 1500   Post-Procedure Length (cm) 1.7 cm 02/21/24 1600   Post-Procedure Width (cm) 2.5 cm 02/21/24 1600   Post-Procedure Depth (cm) 0.6 cm 02/21/24 1600   Post-Procedure Surface Area (cm^2) 4.25 cm^2 02/21/24 1600   Post-Procedure Volume (cm^3) 2.55 cm^3 02/21/24 1600   Wound Healing % -163 03/20/24 1500   Tunneling (cm) 0 cm 04/03/24 1616   Undermining (cm) 0 cm 04/03/24 1616   Wound Odor None 04/03/24 1616   Exposed Structures None 04/03/24 1616   Number of days: 47       Wound 03/20/24 Full Thickness Wound Leg Posterior Left (Active)   Wound Image   04/03/24 1616   Site Assessment Dry;Scabbed 04/03/24 1616   Periwound Assessment Scar tissue;Fragile 04/03/24 1616   Margins Defined edges 04/03/24 1616   Closure Other (Comment) 03/20/24 1500   Drainage Amount Scant 04/03/24 1616   Drainage Description Serous 04/03/24 1616   Treatments Cleansed;Site care 04/03/24 1616   Wound Cleansing Foam Cleanser/Washcloth 04/03/24 1616   Periwound Protectant Skin Protectant Wipes to Periwound;Barrier Paste 04/03/24 1616   Dressing Changed New 03/20/24 1500   Dressing Cleansing/Solutions Not Applicable 04/03/24 1616   Dressing Options Silicone Adhesive Foam;Tubigrip 04/03/24 1616   Dressing Change/Treatment Frequency Monday, Wednesday, Friday, and As Needed 04/03/24 1616   Wound Team Following Weekly 03/20/24 1500   Non-staged Wound Description  "Not applicable 04/03/24 1616   Wound Length (cm) 1.2 cm 03/20/24 1500   Wound Width (cm) 1.8 cm 03/20/24 1500   Wound Depth (cm) 0.1 cm 03/20/24 1500   Wound Surface Area (cm^2) 2.16 cm^2 03/20/24 1500   Wound Volume (cm^3) 0.216 cm^3 03/20/24 1500   Tunneling (cm) 0 cm 04/03/24 1616   Undermining (cm) 0 cm 04/03/24 1616   Wound Odor None 04/03/24 1616   Exposed Structures None 04/03/24 1616   Number of days: 14       PROCEDURE: Excisional debridement of sacral / coccygeal pressure ulcer and right ischial ulcer  -Curette used to debride wound bed inferior wound. Excisional debridement was performed to remove devitalized tissue until healthy, bleeding tissue was visualized.  Total wound area debrided 9.3 cm².  Tissue debrided into the muscle / fascia layer  -Bleeding controlled with manual pressure.    -Wound care completed by wound RN, refer to flowsheet  -Patient tolerated the procedure well, without c/o pain or discomfort.      Pertinent Labs and Diagnostics:    Labs:     A1c: No results found for: \"HBA1C\"     [facility] results, 7/1/2022 (under media tab)    CRP 13    ESR 31      IMAGING:     X-ray left tib-fib ordered 2/16/2024 through [facility]    12/11/2023-CT of abdomen pelvis with contrast  IMPRESSION:   1.  Right ischial decubitus ulcer extending to bone with soft tissue gas tracking along the right perineum. Appearance suggesting osteomyelitis, consider component of necrotizing fasciitis as clinically appropriate.  2.  Small pericardial effusion  3.  Left adrenal nodule, density on prior noncontrast CT demonstrates adenoma.  4.  Hepatomegaly  5.  Enlarged prostate, workup and evaluation for causes of prostate enlargement recommended as clinically appropriate.  6.  Atherosclerosis and atherosclerotic coronary artery disease    12/15/2023-bone scan of left foot  IMPRESSION:     1.  Mild increased activity in the LEFT 1st and 3rd toes on blood pool and delayed images possibly indicating " inflammation/infection.  2.  No significant blood flow asymmetry.          VASCULAR STUDIES: No results found.    LAST  WOUND CULTURE:   Lab Results   Component Value Date/Time    CULTRSULT - (A) 02/22/2024 03:30 PM    CULTRSULT Klebsiella pneumoniae  >100,000 cfu/mL   (A) 02/22/2024 03:30 PM    CULTRSULT Candida tropicalis  10-50,000 cfu/mL   (A) 02/22/2024 03:30 PM      PATHOLOGY  2/17/2023-bone fragment extracted from left lower extremity wound\\  FINAL DIAGNOSIS:     A. Left leg bone fragment at base of chronic wound:          Extensively degenerated fibrocartilaginous tissue with a rim of           fibrinopurulent debris          Correlate with culture findings            Comment: While no residual intact bone is identified, these           findings are suggestive of adjacent osteomyelitis.      ASSESSMENT AND PLAN:     1. Sacral decubitus ulcer, stage IV ([facility])  Comments: Ulcer first noted in early April 2022 as small open area which quickly enlarged.  This ulcer is present distal from previous sacral ulcer which healed after surgery in January.  Patient has history of flap reconstruction x2 to this area.    4/3/2024: Wound slightly smaller, thin layer of tissue over bone which has been stable.  - Excisional debridement of ulcer in clinic today, medically necessary to promote wound healing.  -Patient to return to clinic weekly for assessment and debridement  -Home health to change dressing 2 times per week   -Patient does spend a lot of time up in his wheelchair, and wishes to continue to do so for his quality of life.  He lives independently, drives, and is involved with family activities.  He does have an appropriate pressure-relief cushion and is very well versed on pressure relieving techniques.  - Known OM that was previously treated.  CT scan done during recent hospitalization did not show OM of sacrum, however OM of the ischium noted.    Wound care: Silver Hydrofiber to manage exudate and bioburden,  sacral foam cover dressing    2.  Pressure injury of right ischium, stage IV  Comments: Abscess and OM found on CT during hospitalization in December 2023.  Patient underwent I&D with VAC placement.  IV antibiotics through 1/22/2024.    4/3/2024: Wound slightly smaller, no necrotic tissue. Increased tissue over previous exposed bone. Periwound irritation has resolved.  - Excisional debridement of nonviable tissue from wound in clinic today, medically necessary to promote wound healing.  -Home health has been instructed to do Dakin soaked gauze to wound prior to placement of new VAC dressing as needed for odor / slough.  -Continue wound VAC  -Patient to return to clinic weekly for assessment and debridement  -Home health to change dressing 2 times per week in between clinic visits   -Patient is very well versed on pressure relief measures, has adequate surfaces in wheelchair and on his bed.    Wound care:  NPWT at -125 mmHg to accelerate granulation, change 3 times per week, sacral offloading foam.    3. Postoperative wound dehiscence, subsequent encounter  4. Osteomyelitis of left lower extremity  Comments: On 4/26/2022 patient underwent irrigation and debridement of multiple compartments of the left lower extremity states for pressure injury, with bone excision, and complex closure of chronic wound using biologic skin substitute.  During postop visit in surgeons office on 5/11, surgical site was noted to be dehisced.      4/3/2024: Left medial lower extremity wounds have improved. Wounds wax and wane with known OM.  Historically these wounds have closed and reopened several times.  Possible osteomyelitis of left foot was noted on bone scan during hospitalization.  Ortho was consulted, did not recommend any surgery.  - X-ray left tib-fib inconclusive  -Patient to return to clinic weekly for assessment and debridement as needed  -Home health to change dressing 2 times per week in between clinic visits  -Patient to be  mindful of offloading when supine, should assure that his leg is not rotating medially    Wound care: Hydrofiber silver, silicone foam dressing    5. Deep tissue injury  6. Pressure injury of left foot, stage IV  Comments: DTI to posterior left lower leg first observed in clinic 7/29/2022.  Patient does not know how it started, had been wearing heel float boot consistently.  Evolved into stage IV pressure injury    4/3/2024: Plantar foot ulcer remains healed.  -High risk for recurrence, has opened and closed several times before  -Monitor site each clinic visit     Care: Open to air    7. Pressure injury of left heel, stage 3 (HCC)  Comments: First noted in clinic on 10/21/2022, originally noted to be stage II    4/3/2024: Remains resolved.  Historically has open and close several times.  -Monitor each clinic visit  -Patient to return to clinic weekly for assessment   Wound care: Prevalon boot    8.  Pressure injury of left leg, stage III    4/3/2024:   - Improving. Historically waxes and wanes  - No excisional debridement performed today.   Wound Care: Hydrofiber silver, foam    9.  Quadriplegia, C5-C7, incomplete (HCC)  Comments: Complicating factor.  Impaired mobility and sensation  -Patient is still spending 7-8 hours/day up in his wheelchair, though he does have appropriate offloading cushion, and knows to reposition frequently.  Wears heel float boots bilaterally at all times  Wound care: Silver Hydrofiber to manage exudate and bioburden, foam cover dressing, Hypafix tape     Please note that this note may have been created using voice recognition software. I have worked with technical experts from [facility] to optimize the interface.  I have made every reasonable attempt to correct obvious errors, but there may be errors of grammar and possibly content that I did not discover before finalizing the note.

## 2024-04-10 ENCOUNTER — OFFICE VISIT (OUTPATIENT)
Dept: WOUND CARE | Facility: MEDICAL CENTER | Age: 74
End: 2024-04-10
Attending: STUDENT IN AN ORGANIZED HEALTH CARE EDUCATION/TRAINING PROGRAM
Payer: MEDICARE

## 2024-04-10 VITALS
RESPIRATION RATE: 18 BRPM | TEMPERATURE: 98 F | SYSTOLIC BLOOD PRESSURE: 108 MMHG | OXYGEN SATURATION: 95 % | DIASTOLIC BLOOD PRESSURE: 62 MMHG | HEART RATE: 60 BPM

## 2024-04-10 DIAGNOSIS — G82.54 QUADRIPLEGIA, C5-C7 INCOMPLETE (HCC): ICD-10-CM

## 2024-04-10 DIAGNOSIS — L89.314 PRESSURE INJURY OF RIGHT ISCHIUM, STAGE 4 (HCC): ICD-10-CM

## 2024-04-10 DIAGNOSIS — M86.9 OSTEOMYELITIS OF LEFT LOWER EXTREMITY (HCC): ICD-10-CM

## 2024-04-10 DIAGNOSIS — L89.154 SACRAL DECUBITUS ULCER, STAGE IV (HCC): ICD-10-CM

## 2024-04-10 DIAGNOSIS — L89.894 PRESSURE INJURY OF LEFT FOOT, STAGE 4 (HCC): ICD-10-CM

## 2024-04-10 DIAGNOSIS — L89.893 PRESSURE INJURY OF LEFT LEG, STAGE 3 (HCC): ICD-10-CM

## 2024-04-10 DIAGNOSIS — L89.623 PRESSURE INJURY OF LEFT HEEL, STAGE 3 (HCC): ICD-10-CM

## 2024-04-10 DIAGNOSIS — T14.8XXA DEEP TISSUE INJURY: ICD-10-CM

## 2024-04-10 DIAGNOSIS — T81.31XD POSTOPERATIVE WOUND DEHISCENCE, SUBSEQUENT ENCOUNTER: ICD-10-CM

## 2024-04-10 PROCEDURE — 3078F DIAST BP <80 MM HG: CPT | Performed by: NURSE PRACTITIONER

## 2024-04-10 PROCEDURE — 97605 NEG PRS WND THER DME<=50SQCM: CPT

## 2024-04-10 PROCEDURE — 3074F SYST BP LT 130 MM HG: CPT | Performed by: NURSE PRACTITIONER

## 2024-04-10 PROCEDURE — 11043 DBRDMT MUSC&/FSCA 1ST 20/<: CPT | Performed by: NURSE PRACTITIONER

## 2024-04-10 PROCEDURE — 11042 DBRDMT SUBQ TIS 1ST 20SQCM/<: CPT

## 2024-04-10 PROCEDURE — 11043 DBRDMT MUSC&/FSCA 1ST 20/<: CPT

## 2024-04-10 NOTE — PATIENT INSTRUCTIONS
-Keep your wound dressing clean, dry, and intact.     -Wound vac may not have any drainage in tube or cannister & it will still be working.   Change cannister if it does become full by pressing tab on side of machine to remove canister and snap on new one. Full canister can be thrown in the trash. If cannister fills with bright red blood - go to ER. Dressing will be changed every week at the wound clini.  If you are having issues with your wound VAC, please consider patching leaks, changing the canister, or calling [phone] for troubleshooting. If the wound VAC has been off or un-operational for over 2 hours, call wound care center to inform them and remove all dressings including black foam and replace with normal saline damp gauze.     -Should you experience any significant changes in your wound(s), such as infection (redness, swelling, localized heat, increased pain, fever > 101 F, chills) or have any questions regarding your home care instructions, please contact the wound center at [phone]. If after hours, contact your primary care physician or go to the hospital emergency room.

## 2024-04-10 NOTE — WOUND TEAM
HH order entered into [facility] and routed to [facility] electronically.

## 2024-04-11 NOTE — PROGRESS NOTES
Provider Encounter- Pressure Injury        HISTORY OF PRESENT ILLNESS  Wound History:    START OF CARE IN CLINIC: 1/31/2024 (return to clinic after hospitalization)    REFERRING PROVIDER: Racquel York       WOUNDS-superior coccyx pressure injury, stage IV                                 Coccyx pressure injury, stage IV           Inferior coccyx pressure injury, stage IV                                 Right ischial pressure injury, stage IV                                 Left heel pressure injury, recurring stage III                                 Left posterior lower leg/calf-stage III                                 Left medial lower leg surgical wound dehiscence-resolved, reopens frequently            HISTORY: Patient with history of incomplete quadriplegia referred to [facility] for treatment of a stage IV pressure injury.  He has a history of previous pressure injuries to this area, and underwent muscle flaps in 2019, and then again in 2020.  He was seen in the wound clinic in November 2021 for an ulcer proximal from his current ulcer, and pressure injuries to his left posterior lower leg and left heel.  At that time, it was discovered that the patient had retained VAC foam embedded in the wound bed of the sacral wound.  Attempts were made to get him back to his plastic surgeon, though unsuccessful.  In January he underwent surgical removal of VAC sponge along with excisional debridement of his sacral wound by Dr. Chaves.  After the surgery, his wound went on to heal without incident.   In early April 2022, his home health nurse noted a new sacral ulcer, below the previous ulcer which quickly tripled in size over the following weeks.  The ulcer to his left medial lower leg had also deteriorated, with bone visible at the base..  He was hospitalized from 4/22 until 4/27/2022 and underwent surgery with Dr. Raman on 4/26 for irrigation and debridement of multiple compartments of the left lower extremity, bone  excision, and complex closure of chronic wound using biologic skin substitute.   His sacrococcygeal wound was not surgically addressed during this admission.  He was discharged back to his group home, with home health, and referral to outpatient wound clinic for his sacral wound.  He was instructed to follow-up with his surgeon for his lower leg wound.       Postoperatively, the left medial lower leg incision dehisced.  He was seen by his surgeon at [facility] on 5/11.  The surgeon opted to leave remaining sutures in place, and refer him to the wound clinic for treatment of this wound.   Treatment of this wound was initiated in clinic on 5/12.  During this visit was also noted that his heel DTI had resolved, but that he had a new pressure injury to his left posterior lower extremity.     A new pressure injury was noted to patient's right upper buttock/lower back on 5/20/2022.  Wound was linear in shape, skin discolored but intact.     Abrasion noted to left anterior lower leg.  First observed in clinic on 7/22/2022.  Patient states he bumped his leg into his food tray.     Small DTI noted to patient's left lateral lower leg on 7/29/2022.  Skin intact but discolored.     Large area of deep tissue injury noted to patient's left exterior lower leg.  Patient denied any trauma to this area.  Skin intact.  Wound documented.    1/27/2023: Patient was admitted to [facility] from 1/23-1/25/2023 with gross hematuria. He underwent RICHARD which showed watchman device was in place and he was taken off of Xarelto. While hospitalized wound team was consulted. He was referred back to [facility] and home health upon discharge.    Patient was hospitalized at [facility] for pyelonephritis from 2/26 until 3/2/2023, admitted for fever and general malaise.  He was admitted and initially started on linezolid and meropenem for suspected UTI and history of multidrug-resistant organisms.  Urine cultures were negative. ID was consulted, recommended CT of chest and  abdomen,which were negative for acute findings. However, he was treated with 5 days total course of antibiotics for suspected UTI, and symptoms completely resolved.  During this admission, the inpatient wound team was consulted for treatment of his sacral and lower leg wounds.  A wound culture was taken from his left heel pressure ulcer, negative.  Once stabilized, he was discharged home and referred back to [facility] to resume treatment of his wounds.    Patient was hospitalized at [facility] from 12/11 until 12/23/2023, admitted for fever.  Wound infection suspected.  CT scan of abdomen and pelvis for evaluation of sacral pressure injury showed gas tracking down to the bone consistent with osteomyelitis.  He underwent I&D of right ischial ulcer (documented as buttock) with Dr. Bansal, medial tract leading to an abscess was identified.  Cavity was opened allowing it to drain into the main wound bed.  Wound VAC was placed and managed by wound team during this admission.  A bone scan of patient's left foot was also done, initially concerning for osteomyelitis.  Orthopedic surgery was consulted and did not recommend surgical intervention. ID consulted also, recommended the patient to receive IV ertapenem 1 g every 24 hours plus IV daptomycin 8 mg/kg every 24 hours through 1/22/2024.   He was discharged to LTAC on 1/23 for IV antibiotics and wound care.  From the LTAC he was discharged home on 1/22 with home health and referral back to [facility] to resume management of his wounds  .      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  Never used smokeless tobacco    Patient's problem list, allergies, and  current medications reviewed and updated in Epic    Interval History:  Interval History thinned 7/29/2022.  Please see previous notes for complete interval history.   Interval History thinned 1/27/2023. Please see previous note for complete interval history.  Interval History thinned 3/3/2023.  Please see previous notes for complete interval history.    Interval History thinned 8/4/2023.  Please see previous notes for complete interval history.    Interval History thinned 1/31/2024.  Please see previous notes for complete interval history.      1/31/2024 Initial clinic visit with ADILSON Arthur, HOLDEN, ALEXN, CFTRACI.   Patient returns to clinic after hospitalization and LTAC stay.  VAC in place to right ischial wound.  Sacral wounds have progressed significantly since he was last seen in clinic.  Left medial wound has healed, though covered with friable tissue.  Left heel ulcer, previously resolved has reopened slightly.  He states that he is feeling well overall, denies fevers, chills, nausea, vomiting, cough or shortness of breath.     2/7/2024: Clinic visit with Steve Hodges MD. Patient reports feeling in normal state of health. Patient denies any alarms from wound VAC, however today he presented with significant odor from ischial wound and dressing was partially avulsed leaving in adequate suction. Machine was checked and functioning well, there were no recorded alarms.    2/16/24: Clinic visit with Dana DE ANDA, HOLDEN, ABBY, CFTRACI.  Pt denies fevers, chills, nausea, vomiting.  Left shin fluctuance to wound with hematoma and dark sanguinous drainage.  Bone fragment removed during debridement.  Obtain x-ray as residual bone palpated.  X-ray ordered through [facility].  Coccyx wound has decreased from 3 ulcers to now 1.  Right ischial wound with slough, odor.  Dakin soaked performed during visit.  Wound debrided.  Able to continue VAC postdebridement.     2/21/2024: Clinic visit with  Steve Hodges MD. Patient reports doing ok, reports feeling well. Left medial lower extremity wound is measuring larger with thick slough and friable tissue. Xray completed today, will undoubtedly demonstrate known OM. Patient denies any issues with wound VAC. Home health has been soaking Ischial wound with Dakins prior to applying wound VAC.    2/28/2024: Clinic visit with Steve Hodges MD. Patient recently tested positive for COVID. Reports some congestion, cough, and brain fog. Denies other symptoms. Patient with new wounds to left lower extremity undoubtedly associated with known underlying OM. Patient's sacral and ischial wound appear to be improving, measuring smaller.    3/6/2024: Clinic visit with Steve Hodges MD. Patient reports feeling much better, COVID symptoms have resolved. Patient's left lower extremity wounds are stable, posterior leg wounds appear resolved. Patient sacral wound is stable and ischial wound is improving.    3/13/2024 : Clinic visit with ADILSON Arthur, FNP-BC, CWDINESHN, CFTRACI.   Patient states he is feeling well, offers no complaints.  Left lower extremity wounds continue to wax and wane.  Sacral and ischial wounds slowly progressing.    3/20/2024: Clinic visit with Steve Hodges MD. Patient reports doing ok. Denies any acute issues. Leg wounds continue to wax and wane. He reports there was more slough and increased odor from ischial wound so home health packed with dakins prior to applying VAC. No odor today in clinic.    3/27/2024: Clinic visit with Steve Hodges MD. Patient reports doing ok. Unfortunately home health did not pre-drape bridging the trac pad from wound VAC and right buttocks / hip skin is irritated and at risk of breaking down. Left leg wounds have improved. Sacral and ischial pressure injuries are stable.    4/3/2024: Clinic visit with Steve Hodges MD. Patient reports doing well, denies any acute issues. Leg wounds are all improving, few new pinpoint  areas of skin breakdown. Sacral and ischial pressure injuries slightly improved. Skin on right buttocks and hip has improved and did not fully breakdown.    4/10/2024 : Clinic visit with ADILSON Arthur, ANAHIP-BC, CWDINESHN, CFCN.   Patient continues to feel well.  Today, all of his left lower extremity wounds are healed, though historically have tended to open and close.  Sacral and ischial wounds both measure bit larger today.    REVIEW OF SYSTEMS:   Unchanged from previous wound clinic assessment on 4/3/2024, except as noted in interval history above        PHYSICAL EXAMINATION:   /62   Pulse 60   Temp 36.7 °C (98 °F) (Temporal)   Resp 18   SpO2 95%   Physical Exam  Constitutional:       Appearance: He is obese.   Cardiovascular:      Rate and Rhythm: Normal rate.   Pulmonary:      Effort: Pulmonary effort is normal.   Abdominal:      Comments: Colostomy left lower quadrant   Genitourinary:     Comments: Suprapubic catheter to down drain   Skin:     Comments: Stage IV coccygeal pressure injury - previously 3 wounds, decreased to one wound at distal aspect.  Wound area has increased slightly since last assessment, thin layer of tissue over bone, moderate serosanguineous drainage.  No evidence of infection    Stage IV pressure injury to right ischium-measures larger, no exposed bone noted.  New granulation tissue, moderate serosanguineous drainage.  No evidence of infection    Full-thickness wound to left medial lower leg, surgical wound dehiscence- Wound waxes and wanes.  Presents today with thin, fragile epithelium    Stage III pressure injury left posterior heel - Resolved    Stage IV pressure injury plantar foot -resolved    Stage III pressure injury to posterior left lower leg- Improving     Neurological:      Mental Status: He is alert and oriented to person, place, and time.   Psychiatric:         Mood and Affect: Mood normal.         WOUND ASSESSMENT  Wound 12/12/23 Pressure Injury Ischium Right  (Active)   Wound Image    04/10/24 1500   Site Assessment Red;Yellow 04/10/24 1500   Periwound Assessment Scar tissue 04/10/24 1500   Margins Unattached edges 04/10/24 1500   Drainage Amount Large 04/10/24 1500   Drainage Description Serosanguineous 04/10/24 1500   Treatments Cleansed;Provider debridement 04/10/24 1500   Offloading/DME Other (comment) 03/27/24 1625   Wound Cleansing Hypochlorus Acid 04/10/24 1500   Periwound Protectant Skin Protectant Wipes to Periwound;Drape 04/10/24 1500   Dressing Changed Changed 03/20/24 1500   Dressing Cleansing/Solutions Not Applicable 04/10/24 1500   Dressing Options Wound Vac;Offloading Dressing - Sacral 04/10/24 1500   Dressing Change/Treatment Frequency Monday, Wednesday, Friday, and As Needed 04/10/24 1500   Wound Team Following Weekly 03/20/24 1500   WOUND NURSE ONLY - Pressure Injury Stage 4 04/10/24 1500   Wound Length (cm) 3.5 cm 04/10/24 1500   Wound Width (cm) 3.3 cm 04/10/24 1500   Wound Depth (cm) 1.4 cm 04/10/24 1500   Wound Surface Area (cm^2) 11.55 cm^2 04/10/24 1500   Wound Volume (cm^3) 16.17 cm^3 04/10/24 1500   Post-Procedure Length (cm) 3.5 cm 04/10/24 1500   Post-Procedure Width (cm) 3.4 cm 04/10/24 1500   Post-Procedure Depth (cm) 1.4 cm 04/10/24 1500   Post-Procedure Surface Area (cm^2) 11.9 cm^2 04/10/24 1500   Post-Procedure Volume (cm^3) 16.66 cm^3 04/10/24 1500   Wound Healing % 71 04/10/24 1500   Wound Bed Granulation (%) 100 % 03/06/24 1000   Tunneling (cm) 0 cm 03/27/24 1625   Tunneling Clock Position of Wound 2 03/06/24 1000   Undermining (cm) 3 cm 04/10/24 1500   Undermining of Wound, 1st Location From 7 o'clock;To 11 o'clock 04/10/24 1500   Undermining (cm) - 2nd location 2.5 cm 02/16/24 1620   Undermining of Wound, 2nd Location From 7 o'clock;To 11 o'clock 02/16/24 1620   Wound Odor Mild 04/10/24 1500   Exposed Structures Fascia 04/10/24 1500   Number of days: 120       Wound 01/31/24 Pressure Injury Coccyx Inferior wound, previosly closed  but reopened 1/31/24 (Active)   Wound Image    04/10/24 1500   Site Assessment Red 04/10/24 1500   Periwound Assessment Maceration;Scar tissue 04/10/24 1500   Margins Attached edges;Epibole (rolled edges) 04/10/24 1500   Drainage Amount Moderate 04/10/24 1500   Drainage Description Serosanguineous 04/10/24 1500   Treatments Cleansed;Provider debridement 04/10/24 1500   Wound Cleansing Hypochlorus Acid 04/10/24 1500   Periwound Protectant Skin Protectant Wipes to Periwound;Barrier Paste 04/10/24 1500   Dressing Cleansing/Solutions Not Applicable 04/10/24 1500   Dressing Options Hydrofiber Silver;Offloading Dressing - Sacral 04/10/24 1500   Dressing Change/Treatment Frequency Monday, Wednesday, Friday, and As Needed 04/10/24 1500   Wound Team Following Weekly 03/20/24 1500   WOUND NURSE ONLY - Pressure Injury Stage 3 04/10/24 1500   Wound Length (cm) 0.8 cm 04/10/24 1500   Wound Width (cm) 0.8 cm 04/10/24 1500   Wound Depth (cm) 0.4 cm 04/10/24 1500   Wound Surface Area (cm^2) 0.64 cm^2 04/10/24 1500   Wound Volume (cm^3) 0.256 cm^3 04/10/24 1500   Post-Procedure Length (cm) 0.8 cm 04/10/24 1500   Post-Procedure Width (cm) 0.9 cm 04/10/24 1500   Post-Procedure Depth (cm) 0.4 cm 04/10/24 1500   Post-Procedure Surface Area (cm^2) 0.72 cm^2 04/10/24 1500   Post-Procedure Volume (cm^3) 0.288 cm^3 04/10/24 1500   Wound Healing % 25 04/10/24 1500   Wound Bed Slough (%) 30 % 03/06/24 1000   Tunneling (cm) 0 cm 04/10/24 1500   Undermining (cm) 0 cm 04/10/24 1500   Undermining of Wound, 1st Location From 11 o'clock;To 1 o'clock 03/13/24 1545   Wound Odor None 04/10/24 1500   Exposed Structures None 04/10/24 1500   Number of days: 70       Wound 02/16/24 Full Thickness Wound Leg Medial Left (Active)   Wound Image   04/10/24 1500   Site Assessment Purple;Epithelialization;Fragile 04/10/24 1500   Periwound Assessment Dry;Fragile;Scar tissue 04/10/24 1500   Margins Attached edges 04/03/24 1616   Drainage Amount None 04/10/24 1500    Drainage Description Serosanguineous 04/03/24 1616   Treatments Cleansed 04/10/24 1500   Wound Cleansing Foam Cleanser/Washcloth 04/10/24 1500   Periwound Protectant Not Applicable 04/10/24 1500   Dressing Cleansing/Solutions Not Applicable 04/10/24 1500   Dressing Options Offloading Dressing - Sacral;Offloading Dressing - Heel;Tubigrip 04/10/24 1500   Dressing Change/Treatment Frequency Monday, Wednesday, Friday, and As Needed 04/10/24 1500   Wound Team Following Weekly 03/20/24 1500   Non-staged Wound Description Full thickness 04/10/24 1500   Wound Length (cm) 3.5 cm 03/20/24 1500   Wound Width (cm) 6 cm 03/20/24 1500   Wound Depth (cm) 0.3 cm 03/20/24 1500   Wound Surface Area (cm^2) 21 cm^2 03/20/24 1500   Wound Volume (cm^3) 6.3 cm^3 03/20/24 1500   Post-Procedure Length (cm) 1.7 cm 02/21/24 1600   Post-Procedure Width (cm) 2.5 cm 02/21/24 1600   Post-Procedure Depth (cm) 0.6 cm 02/21/24 1600   Post-Procedure Surface Area (cm^2) 4.25 cm^2 02/21/24 1600   Post-Procedure Volume (cm^3) 2.55 cm^3 02/21/24 1600   Wound Healing % -163 03/20/24 1500   Tunneling (cm) 0 cm 04/10/24 1500   Undermining (cm) 0 cm 04/10/24 1500   Wound Odor None 04/10/24 1500   Exposed Structures None 04/10/24 1500   Number of days: 54       Wound 03/20/24 Full Thickness Wound Leg Posterior Left (Active)   Wound Image   04/10/24 1500   Site Assessment Epithelialization;Purple;Fragile 04/10/24 1500   Periwound Assessment Scar tissue 04/10/24 1500   Margins Defined edges 04/03/24 1616   Closure Other (Comment) 03/20/24 1500   Drainage Amount None 04/10/24 1500   Drainage Description Serous 04/03/24 1616   Treatments Cleansed 04/10/24 1500   Wound Cleansing Foam Cleanser/Washcloth 04/10/24 1500   Periwound Protectant Not Applicable 04/10/24 1500   Dressing Changed New 03/20/24 1500   Dressing Cleansing/Solutions Not Applicable 04/10/24 1500   Dressing Options Offloading Dressing - Sacral;Offloading Dressing - Heel;Tubigrip 04/10/24 1500  "  Dressing Change/Treatment Frequency Monday, Wednesday, Friday, and As Needed 04/10/24 1500   Wound Team Following Weekly 03/20/24 1500   Non-staged Wound Description Not applicable 04/10/24 1500   Wound Length (cm) 1.2 cm 03/20/24 1500   Wound Width (cm) 1.8 cm 03/20/24 1500   Wound Depth (cm) 0.1 cm 03/20/24 1500   Wound Surface Area (cm^2) 2.16 cm^2 03/20/24 1500   Wound Volume (cm^3) 0.216 cm^3 03/20/24 1500   Tunneling (cm) 0 cm 04/10/24 1500   Undermining (cm) 0 cm 04/10/24 1500   Wound Odor None 04/10/24 1500   Exposed Structures None 04/10/24 1500   Number of days: 21       PROCEDURE: Excisional debridement of sacral / coccygeal pressure ulcer and right ischial ulcer  -Curette used to debride wound bed inferior wound. Excisional debridement was performed to remove devitalized tissue until healthy, bleeding tissue was visualized.  Total wound area debrided 12.62 cm².  Tissue debrided into the muscle / fascia layer  -Bleeding controlled with manual pressure.    -Wound care completed by wound RN, refer to flowsheet  -Patient tolerated the procedure well, without c/o pain or discomfort.      Pertinent Labs and Diagnostics:    Labs:     A1c: No results found for: \"HBA1C\"     [facility] results, 7/1/2022 (under media tab)    CRP 13    ESR 31      IMAGING:     X-ray left tib-fib ordered 2/16/2024 through [facility]    12/11/2023-CT of abdomen pelvis with contrast  IMPRESSION:   1.  Right ischial decubitus ulcer extending to bone with soft tissue gas tracking along the right perineum. Appearance suggesting osteomyelitis, consider component of necrotizing fasciitis as clinically appropriate.  2.  Small pericardial effusion  3.  Left adrenal nodule, density on prior noncontrast CT demonstrates adenoma.  4.  Hepatomegaly  5.  Enlarged prostate, workup and evaluation for causes of prostate enlargement recommended as clinically appropriate.  6.  Atherosclerosis and atherosclerotic coronary artery " disease    12/15/2023-bone scan of left foot  IMPRESSION:     1.  Mild increased activity in the LEFT 1st and 3rd toes on blood pool and delayed images possibly indicating inflammation/infection.  2.  No significant blood flow asymmetry.          VASCULAR STUDIES: No results found.    LAST  WOUND CULTURE:   Lab Results   Component Value Date/Time    CULTRSULT - (A) 02/22/2024 03:30 PM    CULTRSULT Klebsiella pneumoniae  >100,000 cfu/mL   (A) 02/22/2024 03:30 PM    CULTRSULT Candida tropicalis  10-50,000 cfu/mL   (A) 02/22/2024 03:30 PM      PATHOLOGY  2/17/2023-bone fragment extracted from left lower extremity wound\\  FINAL DIAGNOSIS:     A. Left leg bone fragment at base of chronic wound:          Extensively degenerated fibrocartilaginous tissue with a rim of           fibrinopurulent debris          Correlate with culture findings            Comment: While no residual intact bone is identified, these           findings are suggestive of adjacent osteomyelitis.      ASSESSMENT AND PLAN:     1. Sacral decubitus ulcer, stage IV ([facility])  Comments: Ulcer first noted in early April 2022 as small open area which quickly enlarged.  This ulcer is present distal from previous sacral ulcer which healed after surgery in January.  Patient has history of flap reconstruction x2 to this area.    4/10/2024: Wound areas increase slightly since last assessment, thin layer of tissue over bone  - Excisional debridement of ulcer in clinic today, medically necessary to promote wound healing.  -Patient to return to clinic weekly for assessment and debridement  -Home health to change dressing 2 times per week   -Patient does spend a lot of time up in his wheelchair, and wishes to continue to do so for his quality of life.  He lives independently, drives, and is involved with family activities.  He does have an appropriate pressure-relief cushion and is very well versed on pressure relieving techniques.  - Known OM that was previously  treated.  CT scan done during recent hospitalization did not show OM of sacrum, however OM of the ischium noted.    Wound care: Silver Hydrofiber to manage exudate and bioburden, sacral foam cover dressing    2.  Pressure injury of right ischium, stage IV  Comments: Abscess and OM found on CT during hospitalization in December 2023.  Patient underwent I&D with VAC placement.  IV antibiotics through 1/22/2024.    4/10/2024: Wound measures larger, new granulation tissue noted, no exposed bone noted  - Excisional debridement of nonviable tissue from wound in clinic today, medically necessary to promote wound healing.  -Home health has been instructed to continue Dakin soaked gauze to wound for 10 to 15 minutes prior to placement of new VAC dressing as needed for odor / slough.  -Continue wound VAC  -Patient to return to clinic weekly for assessment and debridement  -Home health to change dressing 2 times per week in between clinic visits   -Patient is very well versed on pressure relief measures, has adequate surfaces in wheelchair and on his bed.    Wound care:  NPWT at -125 mmHg to accelerate granulation, change 3 times per week, sacral offloading foam.    3. Postoperative wound dehiscence, subsequent encounter  4. Osteomyelitis of left lower extremity  Comments: On 4/26/2022 patient underwent irrigation and debridement of multiple compartments of the left lower extremity states for pressure injury, with bone excision, and complex closure of chronic wound using biologic skin substitute.  During postop visit in surgeons office on 5/11, surgical site was noted to be dehisced.      4/10/2024: None of patient's left lower extremity wounds are open today, though several are covered with fragile epithelium.  Historically these wounds have opened and closed multiple times over the course of treatment in the clinic.  Complicated by underlying osteomyelitis.  - X-ray left tib-fib inconclusive  -Patient to return to clinic  weekly for assessment and debridement as needed  -Home health to change dressing 2 times per week in between clinic visits  -Patient to be mindful of offloading when supine, should assure that his leg is not rotating medially    Wound care: Open to air, Tubigrip manage edema    5. Deep tissue injury  6. Pressure injury of left foot, stage IV  Comments: DTI to posterior left lower leg first observed in clinic 7/29/2022.  Patient does not know how it started, had been wearing heel float boot consistently.  Evolved into stage IV pressure injury    4/10/2024: Wounds are healed, see above  -High risk for recurrence, has opened and closed several times before  -Monitor site each clinic visit     Care: Open to air    7. Pressure injury of left heel, stage 3 (HCC)  Comments: First noted in clinic on 10/21/2022, originally noted to be stage II    4/10/2024: Remains resolved, high risk for recurrence  -Patient to return to clinic weekly for assessment     Wound care: Prevalon boot to alleviate pressure    8.  Pressure injury of left leg, stage III    4/10/2024: Covered with thin, fragile epithelium, no drainage  -Wound is closed, however historically has closed and recurred multiple times  -Monitor each clinic visit       Wound Care: Open to air, Tubigrip to manage edema    9.  Quadriplegia, C5-C7, incomplete (HCC)  Comments: Complicating factor.  Impaired mobility and sensation  -Patient is still spending 7-8 hours/day up in his wheelchair, though he does have appropriate offloading cushion, and knows to reposition frequently.  Wears heel float boots bilaterally at all times  Wound care: Silver Hydrofiber to manage exudate and bioburden, foam cover dressing, Hypafix tape     Please note that this note may have been created using voice recognition software. I have worked with technical experts from [employer] to optimize the interface.  I have made every reasonable attempt to correct obvious errors, but there may be errors  of grammar and possibly content that I did not discover before finalizing the note.

## 2024-04-16 PROBLEM — I48.0 PAROXYSMAL ATRIAL FIBRILLATION (HCC): Status: ACTIVE | Noted: 2022-11-22

## 2024-04-16 PROBLEM — Z87.440 HISTORY OF RECURRENT UTIS: Status: ACTIVE | Noted: 2024-04-16

## 2024-04-17 ENCOUNTER — OFFICE VISIT (OUTPATIENT)
Dept: WOUND CARE | Facility: MEDICAL CENTER | Age: 74
End: 2024-04-17
Attending: STUDENT IN AN ORGANIZED HEALTH CARE EDUCATION/TRAINING PROGRAM
Payer: MEDICARE

## 2024-04-17 VITALS
SYSTOLIC BLOOD PRESSURE: 116 MMHG | TEMPERATURE: 98.5 F | HEART RATE: 61 BPM | RESPIRATION RATE: 18 BRPM | DIASTOLIC BLOOD PRESSURE: 70 MMHG | OXYGEN SATURATION: 97 %

## 2024-04-17 DIAGNOSIS — L89.623 PRESSURE INJURY OF LEFT HEEL, STAGE 3 (HCC): ICD-10-CM

## 2024-04-17 DIAGNOSIS — T81.31XD POSTOPERATIVE WOUND DEHISCENCE, SUBSEQUENT ENCOUNTER: ICD-10-CM

## 2024-04-17 DIAGNOSIS — L89.894 PRESSURE INJURY OF LEFT FOOT, STAGE 4 (HCC): ICD-10-CM

## 2024-04-17 DIAGNOSIS — G82.54 QUADRIPLEGIA, C5-C7 INCOMPLETE (HCC): ICD-10-CM

## 2024-04-17 DIAGNOSIS — L89.154 SACRAL DECUBITUS ULCER, STAGE IV (HCC): ICD-10-CM

## 2024-04-17 DIAGNOSIS — T14.8XXA DEEP TISSUE INJURY: ICD-10-CM

## 2024-04-17 DIAGNOSIS — M86.9 OSTEOMYELITIS OF LEFT LOWER EXTREMITY (HCC): ICD-10-CM

## 2024-04-17 DIAGNOSIS — L89.314 PRESSURE INJURY OF RIGHT ISCHIUM, STAGE 4 (HCC): ICD-10-CM

## 2024-04-17 DIAGNOSIS — L89.893 PRESSURE INJURY OF LEFT LEG, STAGE 3 (HCC): ICD-10-CM

## 2024-04-17 PROCEDURE — 3078F DIAST BP <80 MM HG: CPT | Performed by: NURSE PRACTITIONER

## 2024-04-17 PROCEDURE — 11043 DBRDMT MUSC&/FSCA 1ST 20/<: CPT | Performed by: NURSE PRACTITIONER

## 2024-04-17 PROCEDURE — 3074F SYST BP LT 130 MM HG: CPT | Performed by: NURSE PRACTITIONER

## 2024-04-17 PROCEDURE — 11042 DBRDMT SUBQ TIS 1ST 20SQCM/<: CPT

## 2024-04-17 PROCEDURE — 11043 DBRDMT MUSC&/FSCA 1ST 20/<: CPT

## 2024-04-17 NOTE — PROGRESS NOTES
Provider Encounter- Pressure Injury        HISTORY OF PRESENT ILLNESS  Wound History:    START OF CARE IN CLINIC: 1/31/2024 (return to clinic after hospitalization)    REFERRING PROVIDER: Racquel York       WOUNDS-superior coccyx pressure injury, stage IV                                 Coccyx pressure injury, stage IV           Inferior coccyx pressure injury, stage IV                                 Right ischial pressure injury, stage IV                                 Left heel pressure injury, recurring stage III                                 Left posterior lower leg/calf-stage III                                 Left medial lower leg surgical wound dehiscence-resolved, reopens frequently            HISTORY: Patient with history of incomplete quadriplegia referred to [facility] for treatment of a stage IV pressure injury.  He has a history of previous pressure injuries to this area, and underwent muscle flaps in 2019, and then again in 2020.  He was seen in the wound clinic in November 2021 for an ulcer proximal from his current ulcer, and pressure injuries to his left posterior lower leg and left heel.  At that time, it was discovered that the patient had retained VAC foam embedded in the wound bed of the sacral wound.  Attempts were made to get him back to his plastic surgeon, though unsuccessful.  In January he underwent surgical removal of VAC sponge along with excisional debridement of his sacral wound by Dr. Chaves.  After the surgery, his wound went on to heal without incident.   In early April 2022, his home health nurse noted a new sacral ulcer, below the previous ulcer which quickly tripled in size over the following weeks.  The ulcer to his left medial lower leg had also deteriorated, with bone visible at the base..  He was hospitalized from 4/22 until 4/27/2022 and underwent surgery with Dr. Raman on 4/26 for irrigation and debridement of multiple compartments of the left lower extremity, bone  excision, and complex closure of chronic wound using biologic skin substitute.   His sacrococcygeal wound was not surgically addressed during this admission.  He was discharged back to his group home, with home health, and referral to outpatient wound clinic for his sacral wound.  He was instructed to follow-up with his surgeon for his lower leg wound.       Postoperatively, the left medial lower leg incision dehisced.  He was seen by his surgeon at [facility] on 5/11.  The surgeon opted to leave remaining sutures in place, and refer him to the wound clinic for treatment of this wound.   Treatment of this wound was initiated in clinic on 5/12.  During this visit was also noted that his heel DTI had resolved, but that he had a new pressure injury to his left posterior lower extremity.     A new pressure injury was noted to patient's right upper buttock/lower back on 5/20/2022.  Wound was linear in shape, skin discolored but intact.     Abrasion noted to left anterior lower leg.  First observed in clinic on 7/22/2022.  Patient states he bumped his leg into his food tray.     Small DTI noted to patient's left lateral lower leg on 7/29/2022.  Skin intact but discolored.     Large area of deep tissue injury noted to patient's left exterior lower leg.  Patient denied any trauma to this area.  Skin intact.  Wound documented.    1/27/2023: Patient was admitted to [facility] from 1/23-1/25/2023 with gross hematuria. He underwent RICHARD which showed watchman device was in place and he was taken off of Xarelto. While hospitalized wound team was consulted. He was referred back to [facility] and home health upon discharge.    Patient was hospitalized at [facility] for pyelonephritis from 2/26 until 3/2/2023, admitted for fever and general malaise.  He was admitted and initially started on linezolid and meropenem for suspected UTI and history of multidrug-resistant organisms.  Urine cultures were negative. ID was consulted, recommended CT of chest and  abdomen,which were negative for acute findings. However, he was treated with 5 days total course of antibiotics for suspected UTI, and symptoms completely resolved.  During this admission, the inpatient wound team was consulted for treatment of his sacral and lower leg wounds.  A wound culture was taken from his left heel pressure ulcer, negative.  Once stabilized, he was discharged home and referred back to [facility] to resume treatment of his wounds.    Patient was hospitalized at [facility] from 12/11 until 12/23/2023, admitted for fever.  Wound infection suspected.  CT scan of abdomen and pelvis for evaluation of sacral pressure injury showed gas tracking down to the bone consistent with osteomyelitis.  He underwent I&D of right ischial ulcer (documented as buttock) with Dr. Bansal, medial tract leading to an abscess was identified.  Cavity was opened allowing it to drain into the main wound bed.  Wound VAC was placed and managed by wound team during this admission.  A bone scan of patient's left foot was also done, initially concerning for osteomyelitis.  Orthopedic surgery was consulted and did not recommend surgical intervention. ID consulted also, recommended the patient to receive IV ertapenem 1 g every 24 hours plus IV daptomycin 8 mg/kg every 24 hours through 1/22/2024.   He was discharged to LTAC on 1/23 for IV antibiotics and wound care.  From the LTAC he was discharged home on 1/22 with home health and referral back to [facility] to resume management of his wounds  .      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  Never used smokeless tobacco    Patient's problem list, allergies, and  current medications reviewed and updated in Epic    Interval History:  Interval History thinned 7/29/2022.  Please see previous notes for complete interval history.   Interval History thinned 1/27/2023. Please see previous note for complete interval history.  Interval History thinned 3/3/2023.  Please see previous notes for complete interval history.    Interval History thinned 8/4/2023.  Please see previous notes for complete interval history.    Interval History thinned 1/31/2024.  Please see previous notes for complete interval history.      1/31/2024 Initial clinic visit with ADILSON Arthur, HOLDEN, ALEXN, CFTRACI.   Patient returns to clinic after hospitalization and LTAC stay.  VAC in place to right ischial wound.  Sacral wounds have progressed significantly since he was last seen in clinic.  Left medial wound has healed, though covered with friable tissue.  Left heel ulcer, previously resolved has reopened slightly.  He states that he is feeling well overall, denies fevers, chills, nausea, vomiting, cough or shortness of breath.     2/7/2024: Clinic visit with Steve Hodges MD. Patient reports feeling in normal state of health. Patient denies any alarms from wound VAC, however today he presented with significant odor from ischial wound and dressing was partially avulsed leaving in adequate suction. Machine was checked and functioning well, there were no recorded alarms.    2/16/24: Clinic visit with Dana DE ANDA, HOLDEN, ABBY, CFTRACI.  Pt denies fevers, chills, nausea, vomiting.  Left shin fluctuance to wound with hematoma and dark sanguinous drainage.  Bone fragment removed during debridement.  Obtain x-ray as residual bone palpated.  [facility] ordered through [facility].  Coccyx wound has decreased from 3 ulcers to now 1.  Right ischial wound with slough, odor.  Dakin soaked performed during visit.  Wound debrided.  Able to continue VAC postdebridement.     2/21/2024: Clinic visit with  Steve Hodges MD. Patient reports doing ok, reports feeling well. Left medial lower extremity wound is measuring larger with thick slough and friable tissue. Xray completed today, will undoubtedly demonstrate known OM. Patient denies any issues with wound VAC. Home health has been soaking Ischial wound with Dakins prior to applying wound VAC.    2/28/2024: Clinic visit with Steve Hodges MD. Patient recently tested positive for COVID. Reports some congestion, cough, and brain fog. Denies other symptoms. Patient with new wounds to left lower extremity undoubtedly associated with known underlying OM. Patient's sacral and ischial wound appear to be improving, measuring smaller.    3/6/2024: Clinic visit with Steve Hodges MD. Patient reports feeling much better, COVID symptoms have resolved. Patient's left lower extremity wounds are stable, posterior leg wounds appear resolved. Patient sacral wound is stable and ischial wound is improving.    3/13/2024 : Clinic visit with ADILSON Arthur, FNP-BC, CWDINESHN, CFTRACI.   Patient states he is feeling well, offers no complaints.  Left lower extremity wounds continue to wax and wane.  Sacral and ischial wounds slowly progressing.    3/20/2024: Clinic visit with Steve Hodges MD. Patient reports doing ok. Denies any acute issues. Leg wounds continue to wax and wane. He reports there was more slough and increased odor from ischial wound so home health packed with dakins prior to applying VAC. No odor today in clinic.    3/27/2024: Clinic visit with Steve Hodges MD. Patient reports doing ok. Unfortunately home health did not pre-drape bridging the trac pad from wound VAC and right buttocks / hip skin is irritated and at risk of breaking down. Left leg wounds have improved. Sacral and ischial pressure injuries are stable.    4/3/2024: Clinic visit with Steve Hodges MD. Patient reports doing well, denies any acute issues. Leg wounds are all improving, few new pinpoint  areas of skin breakdown. Sacral and ischial pressure injuries slightly improved. Skin on right buttocks and hip has improved and did not fully breakdown.    4/10/2024 : Clinic visit with ADILSON Arthur, HOLDEN, IMAN, LILIYA.   Patient continues to feel well.  Today, all of his left lower extremity wounds are healed, though historically have tended to open and close.  Sacral and ischial wounds both measure bit larger today.    4/17/2024 : Clinic visit with ADILSON Arthur, HOLDEN, IMAN, LILIYA.   Turk states he is feeling very well.  Left lower extremity wounds remain closed.  Sacral/coccyx ulcers and right ischial ulcer continue to wax and wane.      REVIEW OF SYSTEMS:   Unchanged from previous wound clinic assessment on 4/10/2024, except as noted in interval history above        PHYSICAL EXAMINATION:   /70   Pulse 61   Temp 36.9 °C (98.5 °F) (Temporal)   Resp 18   SpO2 97%   Physical Exam  Constitutional:       Appearance: He is obese.   Cardiovascular:      Rate and Rhythm: Normal rate.   Pulmonary:      Effort: Pulmonary effort is normal.   Abdominal:      Comments: Colostomy left lower quadrant   Genitourinary:     Comments: Suprapubic catheter to down drain   Skin:     Comments: Stage IV coccygeal pressure injury -presents since 2 wounds today, 1 of which appears to be covered with thin epithelium..  Total wound area has decreased since last assessment, thin layer of tissue over bone, moderate serosanguineous drainage.  No evidence of infection    Stage IV pressure injury to right ischium-wound area about the same, though tends to be positional.  Slough to wound bed.  Moderate serosanguineous drainage with mild odor noted.  No periwound erythema or induration    Full-thickness wound to left medial lower leg, surgical wound dehiscence-presents again today with thin, fragile epithelium.  High risk for recurrence    Stage III pressure injury left posterior heel - Resolved, covered with thin  epithelium    Stage III pressure injury to posterior left lower leg-covered with thin epithelium, discolored but intact.     Neurological:      Mental Status: He is alert and oriented to person, place, and time.   Psychiatric:         Mood and Affect: Mood normal.         WOUND ASSESSMENT  Wound 12/12/23 Pressure Injury Ischium Right (Active)   Wound Image   04/17/24 1515   Site Assessment Red;Yellow 04/17/24 1515   Periwound Assessment Scar tissue 04/17/24 1515   Margins Unattached edges 04/17/24 1515   Drainage Amount Large 04/17/24 1515   Drainage Description Serosanguineous 04/17/24 1515   Treatments Cleansed;Provider debridement 04/17/24 1515   Offloading/DME Other (comment) 03/27/24 1625   Wound Cleansing Hypochlorus Acid 04/17/24 1515   Periwound Protectant Skin Protectant Wipes to Periwound;Drape 04/17/24 1515   Dressing Changed Changed 04/17/24 1515   Dressing Cleansing/Solutions Not Applicable 04/17/24 1515   Dressing Options Wound Vac;Offloading Dressing - Sacral 04/17/24 1515   Dressing Change/Treatment Frequency Monday, Wednesday, Friday, and As Needed 04/17/24 1515   Wound Team Following Weekly 04/17/24 1515   WOUND NURSE ONLY - Pressure Injury Stage 4 04/17/24 1515   Wound Length (cm) 3 cm 04/17/24 1515   Wound Width (cm) 3.5 cm 04/17/24 1515   Wound Depth (cm) 2.7 cm 04/17/24 1515   Wound Surface Area (cm^2) 10.5 cm^2 04/17/24 1515   Wound Volume (cm^3) 28.35 cm^3 04/17/24 1515   Post-Procedure Length (cm) 3 cm 04/17/24 1515   Post-Procedure Width (cm) 3.5 cm 04/17/24 1515   Post-Procedure Depth (cm) 2.8 cm 04/17/24 1515   Post-Procedure Surface Area (cm^2) 10.5 cm^2 04/17/24 1515   Post-Procedure Volume (cm^3) 29.4 cm^3 04/17/24 1515   Wound Healing % 49 04/17/24 1515   Wound Bed Granulation (%) 100 % 03/06/24 1000   Tunneling (cm) 0 cm 03/27/24 1625   Tunneling Clock Position of Wound 2 03/06/24 1000   Undermining (cm) 3.6 cm 04/17/24 1515   Undermining of Wound, 1st Location From 7 o'clock;To 2  o'clock 04/17/24 1515   Undermining (cm) - 2nd location 2.5 cm 02/16/24 1620   Undermining of Wound, 2nd Location From 7 o'clock;To 11 o'clock 02/16/24 1620   Wound Odor Other (Comments) 04/17/24 1515   Exposed Structures Fascia 04/17/24 1515   Number of days: 127       Wound 01/31/24 Pressure Injury Coccyx Inferior wound, previosly closed but reopened 1/31/24 (Active)   Wound Image     04/17/24 1515   Site Assessment Red;Fragile 04/17/24 1515   Periwound Assessment Maceration;Scar tissue 04/17/24 1515   Margins Attached edges 04/17/24 1515   Drainage Amount Moderate 04/17/24 1515   Drainage Description Serosanguineous 04/17/24 1515   Treatments Cleansed;Provider debridement 04/17/24 1515   Wound Cleansing Hypochlorus Acid 04/17/24 1515   Periwound Protectant Skin Protectant Wipes to Periwound;Barrier Paste 04/17/24 1515   Dressing Cleansing/Solutions Not Applicable 04/17/24 1515   Dressing Options Hydrofiber Silver Strip;Hydrofiber Silver;Offloading Dressing - Sacral 04/17/24 1515   Dressing Change/Treatment Frequency Monday, Wednesday, Friday, and As Needed 04/17/24 1515   Wound Team Following Weekly 03/20/24 1500   WOUND NURSE ONLY - Pressure Injury Stage 3 04/17/24 1515   Wound Length (cm) 0.6 cm 04/17/24 1515   Wound Width (cm) 0.3 cm 04/17/24 1515   Wound Depth (cm) 0.4 cm 04/17/24 1515   Wound Surface Area (cm^2) 0.18 cm^2 04/17/24 1515   Wound Volume (cm^3) 0.072 cm^3 04/17/24 1515   Post-Procedure Length (cm) 0.7 cm 04/17/24 1515   Post-Procedure Width (cm) 0.4 cm 04/17/24 1515   Post-Procedure Depth (cm) 0.4 cm 04/17/24 1515   Post-Procedure Surface Area (cm^2) 0.28 cm^2 04/17/24 1515   Post-Procedure Volume (cm^3) 0.112 cm^3 04/17/24 1515   Wound Healing % 79 04/17/24 1515   Wound Bed Slough (%) 30 % 03/06/24 1000   Tunneling (cm) 0 cm 04/17/24 1515   Undermining (cm) 0 cm 04/17/24 1515   Undermining of Wound, 1st Location From 11 o'clock;To 1 o'clock 03/13/24 1545   Wound Odor None 04/17/24 1515    Exposed Structures None 04/17/24 1515   Number of days: 77       Wound 02/16/24 Full Thickness Wound Leg Medial Left (Active)   Wound Image   04/17/24 1515   Site Assessment Purple;Epithelialization;Crusted 04/17/24 1515   Periwound Assessment Fragile;Scar tissue 04/17/24 1515   Margins Attached edges 04/03/24 1616   Drainage Amount None 04/17/24 1515   Drainage Description Serosanguineous 04/03/24 1616   Treatments Cleansed 04/17/24 1515   Wound Cleansing Hypochlorus Acid 04/17/24 1515   Periwound Protectant Barrier Paste 04/17/24 1515   Dressing Cleansing/Solutions Not Applicable 04/17/24 1515   Dressing Options Offloading Dressing - Sacral;Offloading Dressing - Heel;Tubigrip 04/17/24 1515   Dressing Change/Treatment Frequency Monday, Wednesday, Friday, and As Needed 04/17/24 1515   Wound Team Following Weekly 03/20/24 1500   Non-staged Wound Description Full thickness 04/10/24 1500   Wound Length (cm) 3.5 cm 03/20/24 1500   Wound Width (cm) 6 cm 03/20/24 1500   Wound Depth (cm) 0.3 cm 03/20/24 1500   Wound Surface Area (cm^2) 21 cm^2 03/20/24 1500   Wound Volume (cm^3) 6.3 cm^3 03/20/24 1500   Post-Procedure Length (cm) 1.7 cm 02/21/24 1600   Post-Procedure Width (cm) 2.5 cm 02/21/24 1600   Post-Procedure Depth (cm) 0.6 cm 02/21/24 1600   Post-Procedure Surface Area (cm^2) 4.25 cm^2 02/21/24 1600   Post-Procedure Volume (cm^3) 2.55 cm^3 02/21/24 1600   Wound Healing % -163 03/20/24 1500   Tunneling (cm) 0 cm 04/17/24 1515   Undermining (cm) 0 cm 04/17/24 1515   Wound Odor None 04/17/24 1515   Exposed Structures None 04/17/24 1515   Number of days: 61       Wound 03/20/24 Full Thickness Wound Leg Posterior Left (Active)   Wound Image   04/17/24 1515   Site Assessment Purple;Epithelialization 04/17/24 1515   Periwound Assessment Scar tissue 04/17/24 1515   Margins Defined edges 04/03/24 1616   Closure Other (Comment) 03/20/24 1500   Drainage Amount None 04/17/24 1515   Drainage Description Serous 04/03/24 1616  "  Treatments Cleansed 04/17/24 1515   Wound Cleansing Hypochlorus Acid 04/17/24 1515   Periwound Protectant Not Applicable 04/17/24 1515   Dressing Changed New 03/20/24 1500   Dressing Cleansing/Solutions Not Applicable 04/17/24 1515   Dressing Options Offloading Dressing - Sacral;Tubigrip 04/17/24 1515   Dressing Change/Treatment Frequency Monday, Wednesday, Friday, and As Needed 04/17/24 1515   Wound Team Following Weekly 03/20/24 1500   Non-staged Wound Description Not applicable 04/17/24 1515   Wound Length (cm) 1.2 cm 03/20/24 1500   Wound Width (cm) 1.8 cm 03/20/24 1500   Wound Depth (cm) 0.1 cm 03/20/24 1500   Wound Surface Area (cm^2) 2.16 cm^2 03/20/24 1500   Wound Volume (cm^3) 0.216 cm^3 03/20/24 1500   Tunneling (cm) 0 cm 04/17/24 1515   Undermining (cm) 0 cm 04/17/24 1515   Wound Odor None 04/17/24 1515   Exposed Structures None 04/17/24 1515   Number of days: 28       PROCEDURE: Excisional debridement of sacral / coccygeal pressure ulcer and right ischial ulcer  -Curette used to debride wound bed inferior wound. Excisional debridement was performed to remove devitalized tissue until healthy, bleeding tissue was visualized.  Total wound area debrided 10.78 cm².  Tissue debrided into the muscle / fascia layer  -Bleeding controlled with manual pressure.    -Wound care completed by wound RN, refer to flowsheet  -Patient tolerated the procedure well, without c/o pain or discomfort.      Pertinent Labs and Diagnostics:    Labs:     A1c: No results found for: \"HBA1C\"     [facility] results, 7/1/2022 (under media tab)    CRP 13    ESR 31      IMAGING:     X-ray left tib-fib ordered 2/16/2024 through [facility]    12/11/2023-CT of abdomen pelvis with contrast  IMPRESSION:   1.  Right ischial decubitus ulcer extending to bone with soft tissue gas tracking along the right perineum. Appearance suggesting osteomyelitis, consider component of necrotizing fasciitis as clinically appropriate.  2.  Small " pericardial effusion  3.  Left adrenal nodule, density on prior noncontrast CT demonstrates adenoma.  4.  Hepatomegaly  5.  Enlarged prostate, workup and evaluation for causes of prostate enlargement recommended as clinically appropriate.  6.  Atherosclerosis and atherosclerotic coronary artery disease    12/15/2023-bone scan of left foot  IMPRESSION:     1.  Mild increased activity in the LEFT 1st and 3rd toes on blood pool and delayed images possibly indicating inflammation/infection.  2.  No significant blood flow asymmetry.          VASCULAR STUDIES: No results found.    LAST  WOUND CULTURE:   Lab Results   Component Value Date/Time    CULTRSULT - (A) 02/22/2024 03:30 PM    CULTRSULT Klebsiella pneumoniae  >100,000 cfu/mL   (A) 02/22/2024 03:30 PM    CULTRSULT Candida tropicalis  10-50,000 cfu/mL   (A) 02/22/2024 03:30 PM      PATHOLOGY  2/17/2023-bone fragment extracted from left lower extremity wound\\  FINAL DIAGNOSIS:     A. Left leg bone fragment at base of chronic wound:          Extensively degenerated fibrocartilaginous tissue with a rim of           fibrinopurulent debris          Correlate with culture findings            Comment: While no residual intact bone is identified, these           findings are suggestive of adjacent osteomyelitis.      ASSESSMENT AND PLAN:     1. Sacral decubitus ulcer, stage IV (HCC)  Comments: Ulcer first noted in early April 2022 as small open area which quickly enlarged.  This ulcer is present distal from previous sacral ulcer which healed after surgery in January.  Patient has history of flap reconstruction x2 to this area.    4/17/2024: Wound area continues to wax and wane.  Presents today as to separate wounds, the most proximal of which is covered with thin epithelium.  - Excisional debridement of ulcer in clinic today, medically necessary to promote wound healing.  -Patient to return to clinic weekly for assessment and debridement  -Home health to change dressing 2  times per week   -Patient does spend a lot of time up in his wheelchair, and wishes to continue to do so for his quality of life.  He lives independently, drives, and is involved with family activities.  He does have an appropriate pressure-relief cushion and is very well versed on pressure relieving techniques.  - Known OM that was previously treated.  CT scan done during recent hospitalization did not show OM of sacrum, however OM of the ischium noted.    Wound care: Silver Hydrofiber to manage exudate and bioburden, sacral foam cover dressing    2.  Pressure injury of right ischium, stage IV  Comments: Abscess and OM found on CT during hospitalization in December 2023.  Patient underwent I&D with VAC placement.  IV antibiotics through 1/22/2024.    4/17/2024: No significant change in wound area, however measurements tend to be dependent on patient's position.  - Excisional debridement of nonviable tissue from wound in clinic today, medically necessary to promote wound healing.  -Home health has been instructed to continue Dakin soaked gauze to wound for 10 to 15 minutes prior to placement of new VAC dressing as needed for odor / slough.  -Patient states he is nearly out of Dakin's.  [facility] quarter strength Dakin's [facility]  However, patient could also purchase off of [employer] for around $25, does not require prescription.  Depending on his co-pay, may be more cost effective to purchase off of [employer].  -Continue wound VAC to ischial wound  -Patient to return to clinic weekly for assessment and debridement  -Home health to change dressing 2 times per week in between clinic visits   -Patient is very well versed on pressure relief measures, has adequate surfaces in wheelchair and on his bed.    Wound care:  NPWT at -125 mmHg to accelerate granulation, change 3 times per week, sacral offloading foam.    3. Postoperative wound dehiscence, subsequent encounter  4. Osteomyelitis of left lower  extremity  Comments: On 4/26/2022 patient underwent irrigation and debridement of multiple compartments of the left lower extremity states for pressure injury, with bone excision, and complex closure of chronic wound using biologic skin substitute.  During postop visit in surgeons office on 5/11, surgical site was noted to be dehisced.      4/17/2024: Left lower extremity wounds remain closed, though at high risk for recurrence.  Historically these wounds have opened and closed multiple times over the course of treatment in the clinic.  Complicated by underlying osteomyelitis.  - X-ray left tib-fib inconclusive  -Monitor each clinic visit, resume topical therapy as indicated.  -Patient to be mindful of offloading when supine, should assure that his leg is not rotating medially    Wound care: Open to air, Tubigrip manage edema    5. Deep tissue injury  6. Pressure injury of left foot, stage IV  Comments: DTI to posterior left lower leg first observed in clinic 7/29/2022.  Patient does not know how it started, had been wearing heel float boot consistently.  Evolved into stage IV pressure injury    4/17/2024: Wounds remained healed, see above  -High risk for recurrence, has opened and closed several times before  -Monitor site each clinic visit     Care: Open to air    7. Pressure injury of left heel, stage 3 ([facility])  Comments: First noted in clinic on 10/21/2022, originally noted to be stage II    4/17/2024: Wound was covered with dry crust, which when removed revealed intact epithelium.  -Wound remains resolved, though covered with thin epithelium.  High risk for recurrence  -Patient to return to clinic weekly for assessment     Wound care: Prevalon boot to alleviate pressure    8.  Pressure injury of left leg, stage III    4/17/2024: Remains healed, covered with thin, fragile epithelium with discoloration.  Appears to be intact with no drainage.  -Wound is closed, however historically has closed and recurred multiple  times  -Monitor each clinic visit       Wound Care: Open to air, Tubigrip to manage edema    9.  Quadriplegia, C5-C7, incomplete (HCC)  Comments: Complicating factor.  Impaired mobility and sensation  -Patient is still spending 7-8 hours/day up in his wheelchair, though he does have appropriate offloading cushion, and knows to reposition frequently.  Wears heel float boots bilaterally at all times  Wound care: Silver Hydrofiber to manage exudate and bioburden, foam cover dressing, Hypafix tape     Please note that this note may have been created using voice recognition software. I have worked with technical experts from [facility] to optimize the interface.  I have made every reasonable attempt to correct obvious errors, but there may be errors of grammar and possibly content that I did not discover before finalizing the note.

## 2024-04-18 NOTE — PROGRESS NOTES
Updated home wound care order routed to [facility] via [facility].

## 2024-04-18 NOTE — PATIENT INSTRUCTIONS
-Keep your wound dressing clean, dry, and intact.     -Change your dressing if it becomes soiled, soaked, or falls off.    -Should you experience any significant changes in your wound(s), such as infection (redness, swelling, localized heat, increased pain, fever > 101 F, chills) or have any questions regarding your home care instructions, please contact the wound center at [phone]. If after hours, contact your primary care physician or go to the hospital emergency room.

## 2024-04-19 RX ORDER — SODIUM HYPOCHLORITE 1.25 MG/ML
SOLUTION TOPICAL
Qty: 473 ML | Refills: 0 | Status: SHIPPED | OUTPATIENT
Start: 2024-04-19

## 2024-04-19 RX ORDER — SODIUM HYPOCHLORITE 1.25 MG/ML
473 SOLUTION TOPICAL
Qty: 473 ML | Refills: 2 | Status: SHIPPED | OUTPATIENT
Start: 2024-04-19

## 2024-04-24 ENCOUNTER — OFFICE VISIT (OUTPATIENT)
Dept: WOUND CARE | Facility: MEDICAL CENTER | Age: 74
End: 2024-04-24
Attending: STUDENT IN AN ORGANIZED HEALTH CARE EDUCATION/TRAINING PROGRAM
Payer: MEDICARE

## 2024-04-24 VITALS
RESPIRATION RATE: 18 BRPM | SYSTOLIC BLOOD PRESSURE: 117 MMHG | OXYGEN SATURATION: 95 % | DIASTOLIC BLOOD PRESSURE: 86 MMHG | TEMPERATURE: 97.2 F | HEART RATE: 56 BPM

## 2024-04-24 DIAGNOSIS — M86.9 OSTEOMYELITIS OF LEFT LOWER EXTREMITY (HCC): ICD-10-CM

## 2024-04-24 DIAGNOSIS — L89.894 PRESSURE INJURY OF LEFT FOOT, STAGE 4 (HCC): ICD-10-CM

## 2024-04-24 DIAGNOSIS — L89.154 SACRAL DECUBITUS ULCER, STAGE IV (HCC): ICD-10-CM

## 2024-04-24 DIAGNOSIS — L89.314 PRESSURE INJURY OF RIGHT ISCHIUM, STAGE 4 (HCC): ICD-10-CM

## 2024-04-24 DIAGNOSIS — G82.54 QUADRIPLEGIA, C5-C7 INCOMPLETE (HCC): ICD-10-CM

## 2024-04-24 DIAGNOSIS — T14.8XXA DEEP TISSUE INJURY: ICD-10-CM

## 2024-04-24 DIAGNOSIS — T81.31XD POSTOPERATIVE WOUND DEHISCENCE, SUBSEQUENT ENCOUNTER: ICD-10-CM

## 2024-04-24 DIAGNOSIS — L89.893 PRESSURE INJURY OF LEFT LEG, STAGE 3 (HCC): ICD-10-CM

## 2024-04-24 DIAGNOSIS — L89.623 PRESSURE INJURY OF LEFT HEEL, STAGE 3 (HCC): ICD-10-CM

## 2024-04-24 PROCEDURE — 11043 DBRDMT MUSC&/FSCA 1ST 20/<: CPT | Performed by: NURSE PRACTITIONER

## 2024-04-24 PROCEDURE — 3074F SYST BP LT 130 MM HG: CPT | Performed by: NURSE PRACTITIONER

## 2024-04-24 PROCEDURE — 3079F DIAST BP 80-89 MM HG: CPT | Performed by: NURSE PRACTITIONER

## 2024-04-24 PROCEDURE — 11043 DBRDMT MUSC&/FSCA 1ST 20/<: CPT

## 2024-04-25 NOTE — PATIENT INSTRUCTIONS
-Keep your wound dressing clean, dry, and intact. Only change dressing if it's over saturated, soiled or falls off.     -Wound vac may not have any drainage in tube or cannister & it will still be working.   Change cannister if it does become full by pressing tab on side of machine to remove canister and snap on new one. Full canister can be thrown in the trash. If cannister fills with bright red blood - go to ER. Dressing will be changed every MWF at the wound clini.  If you are having issues with your wound VAC, please consider patching leaks, changing the canister, or calling [phone] for troubleshooting. If the wound VAC has been off or un-operational for over 2 hours, call wound care center to inform them and remove all dressings including black foam and replace with normal saline damp gauze.     -Should you experience any significant changes in your wound(s), such as infection (redness, swelling, localized heat, increased pain, fever > 101 F, chills) or have any questions regarding your home care instructions, please contact the wound center at [phone]. If after hours, contact your primary care physician or go to the hospital emergency room.

## 2024-04-25 NOTE — PROGRESS NOTES
Provider Encounter- Pressure Injury        HISTORY OF PRESENT ILLNESS  Wound History:    START OF CARE IN CLINIC: 1/31/2024 (return to clinic after hospitalization)    REFERRING PROVIDER: Racquel York       WOUNDS-superior coccyx pressure injury, stage IV                                 Coccyx pressure injury, stage IV           Inferior coccyx pressure injury, stage IV                                 Right ischial pressure injury, stage IV                                 Left heel pressure injury, recurring stage III                                 Left posterior lower leg/calf-stage III                                 Left medial lower leg surgical wound dehiscence-resolved, reopens frequently            HISTORY: Patient with history of incomplete quadriplegia referred to [facility] for treatment of a stage IV pressure injury.  He has a history of previous pressure injuries to this area, and underwent muscle flaps in 2019, and then again in 2020.  He was seen in the wound clinic in November 2021 for an ulcer proximal from his current ulcer, and pressure injuries to his left posterior lower leg and left heel.  At that time, it was discovered that the patient had retained VAC foam embedded in the wound bed of the sacral wound.  Attempts were made to get him back to his plastic surgeon, though unsuccessful.  In January he underwent surgical removal of VAC sponge along with excisional debridement of his sacral wound by Dr. Chaves.  After the surgery, his wound went on to heal without incident.   In early April 2022, his home health nurse noted a new sacral ulcer, below the previous ulcer which quickly tripled in size over the following weeks.  The ulcer to his left medial lower leg had also deteriorated, with bone visible at the base..  He was hospitalized from 4/22 until 4/27/2022 and underwent surgery with Dr. Raman on 4/26 for irrigation and debridement of multiple compartments of the left lower extremity, bone  excision, and complex closure of chronic wound using biologic skin substitute.   His sacrococcygeal wound was not surgically addressed during this admission.  He was discharged back to his group home, with home health, and referral to outpatient wound clinic for his sacral wound.  He was instructed to follow-up with his surgeon for his lower leg wound.       Postoperatively, the left medial lower leg incision dehisced.  He was seen by his surgeon at [facility] on 5/11.  The surgeon opted to leave remaining sutures in place, and refer him to the wound clinic for treatment of this wound.   Treatment of this wound was initiated in clinic on 5/12.  During this visit was also noted that his heel DTI had resolved, but that he had a new pressure injury to his left posterior lower extremity.     A new pressure injury was noted to patient's right upper buttock/lower back on 5/20/2022.  Wound was linear in shape, skin discolored but intact.     Abrasion noted to left anterior lower leg.  First observed in clinic on 7/22/2022.  Patient states he bumped his leg into his food tray.     Small DTI noted to patient's left lateral lower leg on 7/29/2022.  Skin intact but discolored.     Large area of deep tissue injury noted to patient's left exterior lower leg.  Patient denied any trauma to this area.  Skin intact.  Wound documented.    1/27/2023: Patient was admitted to [facility] from 1/23-1/25/2023 with gross hematuria. He underwent RICHARD which showed watchman device was in place and he was taken off of Xarelto. While hospitalized wound team was consulted. He was referred back to [facility] and home health upon discharge.    Patient was hospitalized at [facility] for pyelonephritis from 2/26 until 3/2/2023, admitted for fever and general malaise.  He was admitted and initially started on linezolid and meropenem for suspected UTI and history of multidrug-resistant organisms.  Urine cultures were negative. ID was consulted, recommended CT of chest and  abdomen,which were negative for acute findings. However, he was treated with 5 days total course of antibiotics for suspected UTI, and symptoms completely resolved.  During this admission, the inpatient wound team was consulted for treatment of his sacral and lower leg wounds.  A wound culture was taken from his left heel pressure ulcer, negative.  Once stabilized, he was discharged home and referred back to [facility] to resume treatment of his wounds.    Patient was hospitalized at [facility] from 12/11 until 12/23/2023, admitted for fever.  Wound infection suspected.  CT scan of abdomen and pelvis for evaluation of sacral pressure injury showed gas tracking down to the bone consistent with osteomyelitis.  He underwent I&D of right ischial ulcer (documented as buttock) with Dr. Bansal, medial tract leading to an abscess was identified.  Cavity was opened allowing it to drain into the main wound bed.  Wound VAC was placed and managed by wound team during this admission.  A bone scan of patient's left foot was also done, initially concerning for osteomyelitis.  Orthopedic surgery was consulted and did not recommend surgical intervention. ID consulted also, recommended the patient to receive IV ertapenem 1 g every 24 hours plus IV daptomycin 8 mg/kg every 24 hours through 1/22/2024.   He was discharged to LTAC on 1/23 for IV antibiotics and wound care.  From the LTAC he was discharged home on 1/22 with home health and referral back to [facility] to resume management of his wounds  .      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  Never used smokeless tobacco    Patient's problem list, allergies, and  current medications reviewed and updated in Epic    Interval History:  Interval History thinned 7/29/2022.  Please see previous notes for complete interval history.   Interval History thinned 1/27/2023. Please see previous note for complete interval history.  Interval History thinned 3/3/2023.  Please see previous notes for complete interval history.    Interval History thinned 8/4/2023.  Please see previous notes for complete interval history.    Interval History thinned 1/31/2024.  Please see previous notes for complete interval history.      1/31/2024 Initial clinic visit with ADILSON Arthur, HOLDEN, ALEXN, CFTRACI.   Patient returns to clinic after hospitalization and LTAC stay.  VAC in place to right ischial wound.  Sacral wounds have progressed significantly since he was last seen in clinic.  Left medial wound has healed, though covered with friable tissue.  Left heel ulcer, previously resolved has reopened slightly.  He states that he is feeling well overall, denies fevers, chills, nausea, vomiting, cough or shortness of breath.     2/7/2024: Clinic visit with Steve Hodges MD. Patient reports feeling in normal state of health. Patient denies any alarms from wound VAC, however today he presented with significant odor from ischial wound and dressing was partially avulsed leaving in adequate suction. Machine was checked and functioning well, there were no recorded alarms.    2/16/24: Clinic visit with Dana DE ANDA, HOLDEN, ABBY, CFTRACI.  Pt denies fevers, chills, nausea, vomiting.  Left shin fluctuance to wound with hematoma and dark sanguinous drainage.  Bone fragment removed during debridement.  Obtain x-ray as residual bone palpated.  [facility] through [facility].  Coccyx wound has decreased from 3 ulcers to now 1.  Right ischial wound with slough, odor.  Dakin soaked performed during visit.  Wound debrided.  Able to continue VAC postdebridement.     2/21/2024: Clinic visit with  Steve Hodges MD. Patient reports doing ok, reports feeling well. Left medial lower extremity wound is measuring larger with thick slough and friable tissue. Xray completed today, will undoubtedly demonstrate known OM. Patient denies any issues with wound VAC. Home health has been soaking Ischial wound with Dakins prior to applying wound VAC.    2/28/2024: Clinic visit with Steve Hodges MD. Patient recently tested positive for COVID. Reports some congestion, cough, and brain fog. Denies other symptoms. Patient with new wounds to left lower extremity undoubtedly associated with known underlying OM. Patient's sacral and ischial wound appear to be improving, measuring smaller.    3/6/2024: Clinic visit with Steve Hodges MD. Patient reports feeling much better, COVID symptoms have resolved. Patient's left lower extremity wounds are stable, posterior leg wounds appear resolved. Patient sacral wound is stable and ischial wound is improving.    3/13/2024 : Clinic visit with ADILSON Arthur, FNP-BC, CWDINESHN, CFTRACI.   Patient states he is feeling well, offers no complaints.  Left lower extremity wounds continue to wax and wane.  Sacral and ischial wounds slowly progressing.    3/20/2024: Clinic visit with Steve Hodges MD. Patient reports doing ok. Denies any acute issues. Leg wounds continue to wax and wane. He reports there was more slough and increased odor from ischial wound so home health packed with dakins prior to applying VAC. No odor today in clinic.    3/27/2024: Clinic visit with Steve Hodges MD. Patient reports doing ok. Unfortunately home health did not pre-drape bridging the trac pad from wound VAC and right buttocks / hip skin is irritated and at risk of breaking down. Left leg wounds have improved. Sacral and ischial pressure injuries are stable.    4/3/2024: Clinic visit with Steve Hodges MD. Patient reports doing well, denies any acute issues. Leg wounds are all improving, few new pinpoint  areas of skin breakdown. Sacral and ischial pressure injuries slightly improved. Skin on right buttocks and hip has improved and did not fully breakdown.    4/10/2024 : Clinic visit with ADILSON Arthur, HOLDEN, IMAN, LILIYA.   Patient continues to feel well.  Today, all of his left lower extremity wounds are healed, though historically have tended to open and close.  Sacral and ischial wounds both measure bit larger today.    4/17/2024 : Clinic visit with ADILSON Arthur, HOLDEN, LILIYA VALERIO.   Anjum states he is feeling very well.  Left lower extremity wounds remain closed.  Sacral/coccyx ulcers and right ischial ulcer continue to wax and wane.      4/24/2024 : Clinic visit with ADILSON Arthur FNP-BC, IMAN, LILIYA.   Anjum states he is feeling well.  He is left medial lower leg wound, and left heel wounds have reopened slightly, neither of which required excisional debridement today.  Sacral and ischial wounds are slowly progressing.   He has not yet obtained quarter strength Dakin's for soaks with VAC dressing change.  He decided to order from [facility].  However, I also [facility] order [facility] last week.    REVIEW OF SYSTEMS:   Unchanged from previous wound clinic assessment on 4/17/2024, except as noted in interval history above        PHYSICAL EXAMINATION:   /86   Pulse (!) 56   Temp 36.2 °C (97.2 °F)   Resp 18   SpO2 95%   Physical Exam  Constitutional:       Appearance: He is obese.   Cardiovascular:      Rate and Rhythm: Normal rate.   Pulmonary:      Effort: Pulmonary effort is normal.   Abdominal:      Comments: Colostomy left lower quadrant   Genitourinary:     Comments: Suprapubic catheter to down drain   Skin:     Comments: Stage IV coccygeal pressure injury -wounds tend to wax and wane.  Today he presents with 1 open wound, minimal drainage, no evidence of infection.    Stage IV pressure injury to right ischium-wound area, depth, and depth of undermining has not changed  significantly since last assessment.  Still probes close to bone.  Mild odor noted.  No periwound erythema or induration.  Skin flap from approximately 6-12 o'clock.    Full-thickness wound to left medial lower leg, surgical wound dehiscence-has reopened slightly, minimal drainage.  Periwound skin/tissue is friable    Stage III pressure injury left posterior heel -wound has reopened slightly, several small shallow wounds within area of scar tissue.  Minimal to moderate serosanguineous drainage.  No evidence of infection    Stage III pressure injury to posterior left lower leg-remains resolved.  Covered with thin epithelium, dark red/purple discoloration.     Neurological:      Mental Status: He is alert and oriented to person, place, and time.   Psychiatric:         Mood and Affect: Mood normal.         WOUND ASSESSMENT  Wound 12/12/23 Pressure Injury Ischium Right (Active)   Wound Image   04/24/24 1530   Site Assessment Red;Yellow;Grey 04/24/24 1530   Periwound Assessment Scar tissue 04/24/24 1530   Margins Unattached edges 04/24/24 1530   Drainage Amount Large 04/24/24 1530   Drainage Description Serosanguineous 04/24/24 1530   Treatments Cleansed;Provider debridement;Site care 04/24/24 1530   Offloading/DME Other (comment) 03/27/24 1625   Wound Cleansing Hypochlorus Acid 04/24/24 1530   Periwound Protectant No-sting Skin Prep;Drape 04/24/24 1530   Dressing Changed Changed 04/24/24 1530   Dressing Cleansing/Solutions Not Applicable 04/24/24 1530   Dressing Options Wound Vac;Offloading Dressing - Sacral 04/24/24 1530   Dressing Change/Treatment Frequency Monday, Wednesday, Friday, and As Needed 04/24/24 1530   Wound Team Following Weekly 04/24/24 1530   WOUND NURSE ONLY - Pressure Injury Stage 4 04/24/24 1530   Non-staged Wound Description Not applicable 04/24/24 1530   Wound Length (cm) 3 cm 04/24/24 1530   Wound Width (cm) 3 cm 04/24/24 1530   Wound Depth (cm) 2 cm 04/24/24 1530   Wound Surface Area (cm^2) 9 cm^2  04/24/24 1530   Wound Volume (cm^3) 18 cm^3 04/24/24 1530   Post-Procedure Length (cm) 3.1 cm 04/24/24 1530   Post-Procedure Width (cm) 3.1 cm 04/24/24 1530   Post-Procedure Depth (cm) 2.5 cm 04/24/24 1530   Post-Procedure Surface Area (cm^2) 9.61 cm^2 04/24/24 1530   Post-Procedure Volume (cm^3) 24.025 cm^3 04/24/24 1530   Wound Healing % 68 04/24/24 1530   Wound Bed Granulation (%) 100 % 03/06/24 1000   Tunneling (cm) 0 cm 04/24/24 1530   Tunneling Clock Position of Wound 2 03/06/24 1000   Undermining (cm) 3.6 cm 04/24/24 1530   Undermining of Wound, 1st Location From 12 o'clock;To 8 o'clock 04/24/24 1530   Undermining (cm) - 2nd location 2.5 cm 02/16/24 1620   Undermining of Wound, 2nd Location From 7 o'clock;To 11 o'clock 02/16/24 1620   Wound Odor Mild 04/24/24 1530   Exposed Structures Fascia 04/24/24 1530   Number of days: 134       Wound 01/31/24 Pressure Injury Coccyx Inferior wound, previosly closed but reopened 1/31/24 (Active)   Wound Image   04/24/24 1530   Site Assessment Red;[place] 04/24/24 1530   Periwound Assessment Maceration;Scar tissue 04/24/24 1530   Margins Attached edges 04/24/24 1530   Drainage Amount Moderate 04/24/24 1530   Drainage Description Serosanguineous 04/24/24 1530   Treatments Cleansed;Site care 04/24/24 1530   Wound Cleansing Hypochlorus Acid 04/24/24 1530   Periwound Protectant No-sting Skin Prep;Barrier Paste 04/24/24 1530   Dressing Cleansing/Solutions Not Applicable 04/24/24 1530   Dressing Options Hydrofiber Silver Strip;Hydrofiber Silver;Offloading Dressing - Sacral 04/24/24 1530   Dressing Change/Treatment Frequency Monday, Wednesday, Friday, and As Needed 04/24/24 1530   Wound Team Following Weekly 03/20/24 1500   WOUND NURSE ONLY - Pressure Injury Stage 3 04/24/24 1530   Wound Length (cm) 0.8 cm 04/24/24 1530   Wound Width (cm) 1 cm 04/24/24 1530   Wound Depth (cm) 0.4 cm 04/24/24 1530   Wound Surface Area (cm^2) 0.8 cm^2 04/24/24 1530   Wound Volume (cm^3) 0.32 cm^3  04/24/24 1530   Post-Procedure Length (cm) 0.8 cm 04/24/24 1530   Post-Procedure Width (cm) 1 cm 04/24/24 1530   Post-Procedure Depth (cm) 0.4 cm 04/24/24 1530   Post-Procedure Surface Area (cm^2) 0.8 cm^2 04/24/24 1530   Post-Procedure Volume (cm^3) 0.32 cm^3 04/24/24 1530   Wound Healing % 7 04/24/24 1530   Wound Bed Slough (%) 30 % 03/06/24 1000   Tunneling (cm) 0 cm 04/24/24 1530   Undermining (cm) 0 cm 04/24/24 1530   Undermining of Wound, 1st Location From 11 o'clock;To 1 o'clock 03/13/24 1545   Wound Odor None 04/24/24 1530   Exposed Structures None 04/24/24 1530   Number of days: 84       Wound 02/16/24 Full Thickness Wound Leg Medial Left (Active)   Wound Image   04/24/24 1530   Site Assessment Purple;Red;Epithelialization 04/24/24 1530   Periwound Assessment Fragile;Scar tissue 04/24/24 1530   Margins Attached edges 04/24/24 1530   Drainage Amount Scant 04/24/24 1530   Drainage Description Serosanguineous 04/24/24 1530   Treatments Cleansed;Site care 04/24/24 1530   Wound Cleansing Hypochlorus Acid 04/24/24 1530   Periwound Protectant Barrier Paste 04/24/24 1530   Dressing Cleansing/Solutions Not Applicable 04/24/24 1530   Dressing Options Offloading Dressing - Heel;Tubigrip 04/24/24 1530   Dressing Change/Treatment Frequency Monday, Wednesday, Friday, and As Needed 04/24/24 1530   Wound Team Following Weekly 03/20/24 1500   Non-staged Wound Description Full thickness 04/24/24 1530   Wound Length (cm) 0.3 cm 04/24/24 1530   Wound Width (cm) 0.3 cm 04/24/24 1530   Wound Depth (cm) 0.1 cm 04/24/24 1530   Wound Surface Area (cm^2) 0.09 cm^2 04/24/24 1530   Wound Volume (cm^3) 0.009 cm^3 04/24/24 1530   Post-Procedure Length (cm) 0.3 cm 04/24/24 1530   Post-Procedure Width (cm) 0.3 cm 04/24/24 1530   Post-Procedure Depth (cm) 0.1 cm 04/24/24 1530   Post-Procedure Surface Area (cm^2) 0.09 cm^2 04/24/24 1530   Post-Procedure Volume (cm^3) 0.009 cm^3 04/24/24 1530   Wound Healing % 100 04/24/24 1530   Tunneling  "(cm) 0 cm 04/24/24 1530   Undermining (cm) 0 cm 04/24/24 1530   Wound Odor None 04/24/24 1530   Exposed Structures None 04/24/24 1530   Number of days: 68       PROCEDURE: Excisional debridement of right ischial ulcer  -Curette used to debride wound bed inferior wound. Excisional debridement was performed to remove devitalized tissue until healthy, bleeding tissue was visualized.  Total wound area debrided 10.5 cm².  Tissue debrided into the muscle / fascia layer  -Bleeding controlled with manual pressure.    -Wound care completed by wound RN, refer to flowsheet  -Patient tolerated the procedure well, without c/o pain or discomfort.      Pertinent Labs and Diagnostics:    Labs:     A1c: No results found for: \"HBA1C\"     [facility] results, 7/1/2022 (under media tab)    CRP 13    ESR 31      IMAGING:     X-ray left tib-fib ordered 2/16/2024 through [facility]    12/11/2023-CT of abdomen pelvis with contrast  IMPRESSION:   1.  Right ischial decubitus ulcer extending to bone with soft tissue gas tracking along the right perineum. Appearance suggesting osteomyelitis, consider component of necrotizing fasciitis as clinically appropriate.  2.  Small pericardial effusion  3.  Left adrenal nodule, density on prior noncontrast CT demonstrates adenoma.  4.  Hepatomegaly  5.  Enlarged prostate, workup and evaluation for causes of prostate enlargement recommended as clinically appropriate.  6.  Atherosclerosis and atherosclerotic coronary artery disease    12/15/2023-bone scan of left foot  IMPRESSION:     1.  Mild increased activity in the LEFT 1st and 3rd toes on blood pool and delayed images possibly indicating inflammation/infection.  2.  No significant blood flow asymmetry.          VASCULAR STUDIES: No results found.    LAST  WOUND CULTURE:   Lab Results   Component Value Date/Time    CULTRSULT - (A) 02/22/2024 03:30 PM    CULTRSULT Klebsiella pneumoniae  >100,000 cfu/mL   (A) 02/22/2024 03:30 PM    CULTRSULT " Candida tropicalis  10-50,000 cfu/mL   (A) 02/22/2024 03:30 PM      PATHOLOGY  2/17/2023-bone fragment extracted from left lower extremity wound\\  FINAL DIAGNOSIS:     A. Left leg bone fragment at base of chronic wound:          Extensively degenerated fibrocartilaginous tissue with a rim of           fibrinopurulent debris          Correlate with culture findings            Comment: While no residual intact bone is identified, these           findings are suggestive of adjacent osteomyelitis.      ASSESSMENT AND PLAN:     1. Sacral decubitus ulcer, stage IV ([facility])  Comments: Ulcer first noted in early April 2022 as small open area which quickly enlarged.  This ulcer is present distal from previous sacral ulcer which healed after surgery in January.  Patient has history of flap reconstruction x2 to this area.    4/24/2024:: This wound continues to wax and wane.  Last week appeared as several separate wounds.  Today appears as 1 single wound.    -No excisional debridement required today  -Patient to return to clinic weekly for assessment and debridement  -Home health to change dressing 2 times per week   -Patient does spend a lot of time up in his wheelchair, and wishes to continue to do so for his quality of life.  He lives independently, drives, and is involved with family activities.  He does have an appropriate pressure-relief cushion and is very well versed on pressure relieving techniques.  - Known OM that was previously treated.  CT scan done during recent hospitalization did not show OM of sacrum, however OM of the ischium noted.    Wound care: Silver Hydrofiber to manage exudate and bioburden, sacral foam cover dressing    2.  Pressure injury of right ischium, stage IV  Comments: Abscess and OM found on CT during hospitalization in December 2023.  Patient underwent I&D with VAC placement.  IV antibiotics through 1/22/2024.    4/24/2024: No significant change in wound area, depth, or depth of undermining  -  Excisional debridement of nonviable tissue from wound in clinic today, medically necessary to promote wound healing.  -Home health has been instructed to continue Dakins soaked gauze to wound for 10 to 15 minutes prior to placement of new VAC dressing as needed for odor / slough.  -Rx for quarter strength Dakin's to [facility] pharmacy last week.  However, patient opted to order off of [employer] instead.  -Continue wound VAC to ischial wound  -Patient to return to clinic weekly for assessment and debridement  -Schedule patient for longer visit in the next week or 2 for excision of skin flap around wound edge  -Home health to change dressing 2 times per week in between clinic visits   -Patient is very well versed on pressure relief measures, has adequate surfaces in wheelchair and on his bed.    Wound care:  NPWT at -125 mmHg to accelerate granulation, change 3 times per week, sacral offloading foam.    3. Postoperative wound dehiscence, subsequent encounter  4. Osteomyelitis of left lower extremity  Comments: On 4/26/2022 patient underwent irrigation and debridement of multiple compartments of the left lower extremity states for pressure injury, with bone excision, and complex closure of chronic wound using biologic skin substitute.  During postop visit in surgeons office on 5/11, surgical site was noted to be dehisced.      4/24/2024: Left medial lower leg wound has reopened slightly tissue friable.  Historically these wounds have opened and closed multiple times over the course of treatment in the clinic.  Complicated by underlying osteomyelitis.  - X-ray left tib-fib inconclusive  -No excisional debridement of these wounds required today  -Resumed therapy  -Patient to be mindful of offloading when supine, should assure that his leg is not rotating medially    Wound care: Silicone foam dressing, Tubigrip D    5. Deep tissue injury  6. Pressure injury of left foot, stage IV  Comments: DTI to posterior left  lower leg first observed in clinic 7/29/2022.  Patient does not know how it started, had been wearing heel float boot consistently.  Evolved into stage IV pressure injury    4/17/2024: Wounds remained healed, see above  -High risk for recurrence, has opened and closed several times before  -Monitor site each clinic visit     Care: Open to air    7. Pressure injury of left heel, stage 3 (formerly [facility])  Comments: First noted in clinic on 10/21/2022, originally noted to be stage II    4/24/2024: Wound has reopened slightly, shallow with minimal drainage.    -Resume topical therapy  -Patient to return to clinic weekly for assessment   -Prevalon boot to alleviate pressure      Wound care: Silicone heel offloading dressing    8.  Pressure injury of left leg, stage III    4/24/2024: Remains healed, covered with thin, fragile epithelium with discoloration.  Appears to be intact with no drainage.  -Wound is closed, however historically has closed and recurred multiple times  -Monitor each clinic visit       Wound Care: Open to air, Tubigrip to manage edema    9.  Quadriplegia, C5-C7, incomplete (formerly [facility])  Comments: Complicating factor.  Impaired mobility and sensation  -Patient is still spending 7-8 hours/day up in his wheelchair, though he does have appropriate offloading cushion, and knows to reposition frequently.  Wears heel float boots bilaterally at all times  Wound care: Silver Hydrofiber to manage exudate and bioburden, foam cover dressing, Hypafix tape     Please note that this note may have been created using voice recognition software. I have worked with technical experts from [employer] to optimize the interface.  I have made every reasonable attempt to correct obvious errors, but there may be errors of grammar and possibly content that I did not discover before finalizing the note.

## 2024-04-29 ENCOUNTER — HOSPITAL ENCOUNTER (EMERGENCY)
Facility: MEDICAL CENTER | Age: 74
End: 2024-04-29
Attending: STUDENT IN AN ORGANIZED HEALTH CARE EDUCATION/TRAINING PROGRAM
Payer: MEDICARE

## 2024-04-29 ENCOUNTER — APPOINTMENT (OUTPATIENT)
Dept: RADIOLOGY | Facility: MEDICAL CENTER | Age: 74
End: 2024-04-29
Attending: STUDENT IN AN ORGANIZED HEALTH CARE EDUCATION/TRAINING PROGRAM
Payer: MEDICARE

## 2024-04-29 VITALS
DIASTOLIC BLOOD PRESSURE: 62 MMHG | BODY MASS INDEX: 30.78 KG/M2 | TEMPERATURE: 100.4 F | HEIGHT: 70 IN | RESPIRATION RATE: 19 BRPM | WEIGHT: 215 LBS | HEART RATE: 87 BPM | SYSTOLIC BLOOD PRESSURE: 135 MMHG | OXYGEN SATURATION: 91 %

## 2024-04-29 DIAGNOSIS — N30.00 ACUTE CYSTITIS WITHOUT HEMATURIA: ICD-10-CM

## 2024-04-29 DIAGNOSIS — M86.651 CHRONIC OSTEOMYELITIS OF RIGHT PELVIC REGION (HCC): ICD-10-CM

## 2024-04-29 DIAGNOSIS — U07.1 COVID-19: ICD-10-CM

## 2024-04-29 LAB
ALBUMIN SERPL BCP-MCNC: 3.4 G/DL (ref 3.2–4.9)
ALBUMIN/GLOB SERPL: 0.9 G/DL
ALP SERPL-CCNC: 74 U/L (ref 30–99)
ALT SERPL-CCNC: 5 U/L (ref 2–50)
ANION GAP SERPL CALC-SCNC: 12 MMOL/L (ref 7–16)
APPEARANCE UR: ABNORMAL
AST SERPL-CCNC: 9 U/L (ref 12–45)
BACTERIA #/AREA URNS HPF: ABNORMAL /HPF
BASOPHILS # BLD AUTO: 0.1 % (ref 0–1.8)
BASOPHILS # BLD: 0.01 K/UL (ref 0–0.12)
BILIRUB SERPL-MCNC: 0.6 MG/DL (ref 0.1–1.5)
BILIRUB UR QL STRIP.AUTO: NEGATIVE
BUN SERPL-MCNC: 14 MG/DL (ref 8–22)
CALCIUM ALBUM COR SERPL-MCNC: 9.4 MG/DL (ref 8.5–10.5)
CALCIUM SERPL-MCNC: 8.9 MG/DL (ref 8.4–10.2)
CHLORIDE SERPL-SCNC: 99 MMOL/L (ref 96–112)
CO2 SERPL-SCNC: 21 MMOL/L (ref 20–33)
COLOR UR: YELLOW
CREAT SERPL-MCNC: 0.58 MG/DL (ref 0.5–1.4)
EOSINOPHIL # BLD AUTO: 0 K/UL (ref 0–0.51)
EOSINOPHIL NFR BLD: 0 % (ref 0–6.9)
EPI CELLS #/AREA URNS HPF: NEGATIVE /HPF
ERYTHROCYTE [DISTWIDTH] IN BLOOD BY AUTOMATED COUNT: 58.9 FL (ref 35.9–50)
FLUAV RNA SPEC QL NAA+PROBE: NEGATIVE
FLUBV RNA SPEC QL NAA+PROBE: NEGATIVE
GFR SERPLBLD CREATININE-BSD FMLA CKD-EPI: 102 ML/MIN/1.73 M 2
GLOBULIN SER CALC-MCNC: 3.9 G/DL (ref 1.9–3.5)
GLUCOSE SERPL-MCNC: 125 MG/DL (ref 65–99)
GLUCOSE UR STRIP.AUTO-MCNC: NEGATIVE MG/DL
HCT VFR BLD AUTO: 38.8 % (ref 42–52)
HGB BLD-MCNC: 12.7 G/DL (ref 14–18)
IMM GRANULOCYTES # BLD AUTO: 0.05 K/UL (ref 0–0.11)
IMM GRANULOCYTES NFR BLD AUTO: 0.5 % (ref 0–0.9)
KETONES UR STRIP.AUTO-MCNC: NEGATIVE MG/DL
LACTATE SERPL-SCNC: 1.4 MMOL/L (ref 0.5–2)
LEUKOCYTE ESTERASE UR QL STRIP.AUTO: NEGATIVE
LYMPHOCYTES # BLD AUTO: 0.27 K/UL (ref 1–4.8)
LYMPHOCYTES NFR BLD: 2.9 % (ref 22–41)
MCH RBC QN AUTO: 32.1 PG (ref 27–33)
MCHC RBC AUTO-ENTMCNC: 32.7 G/DL (ref 32.3–36.5)
MCV RBC AUTO: 98 FL (ref 81.4–97.8)
MICRO URNS: ABNORMAL
MONOCYTES # BLD AUTO: 0.29 K/UL (ref 0–0.85)
MONOCYTES NFR BLD AUTO: 3.1 % (ref 0–13.4)
MUCOUS THREADS #/AREA URNS HPF: ABNORMAL /HPF
NEUTROPHILS # BLD AUTO: 8.76 K/UL (ref 1.82–7.42)
NEUTROPHILS NFR BLD: 93.4 % (ref 44–72)
NITRITE UR QL STRIP.AUTO: POSITIVE
NRBC # BLD AUTO: 0 K/UL
NRBC BLD-RTO: 0 /100 WBC (ref 0–0.2)
PH UR STRIP.AUTO: 6.5 [PH] (ref 5–8)
PLATELET # BLD AUTO: 157 K/UL (ref 164–446)
PMV BLD AUTO: 8.5 FL (ref 9–12.9)
POTASSIUM SERPL-SCNC: 3.5 MMOL/L (ref 3.6–5.5)
PROT SERPL-MCNC: 7.3 G/DL (ref 6–8.2)
PROT UR QL STRIP: 100 MG/DL
RBC # BLD AUTO: 3.96 M/UL (ref 4.7–6.1)
RBC # URNS HPF: ABNORMAL /HPF
RBC UR QL AUTO: ABNORMAL
RSV RNA SPEC QL NAA+PROBE: NEGATIVE
SARS-COV-2 RNA RESP QL NAA+PROBE: DETECTED
SODIUM SERPL-SCNC: 132 MMOL/L (ref 135–145)
SP GR UR STRIP.AUTO: <=1.005
SPECIMEN SOURCE: ABNORMAL
WBC # BLD AUTO: 9.4 K/UL (ref 4.8–10.8)
WBC #/AREA URNS HPF: ABNORMAL /HPF

## 2024-04-29 PROCEDURE — 87040 BLOOD CULTURE FOR BACTERIA: CPT | Mod: 91

## 2024-04-29 PROCEDURE — A9270 NON-COVERED ITEM OR SERVICE: HCPCS | Performed by: STUDENT IN AN ORGANIZED HEALTH CARE EDUCATION/TRAINING PROGRAM

## 2024-04-29 PROCEDURE — 81001 URINALYSIS AUTO W/SCOPE: CPT

## 2024-04-29 PROCEDURE — 0241U HCHG SARS-COV-2 COVID-19 NFCT DS RESP RNA 4 TRGT MIC: CPT

## 2024-04-29 PROCEDURE — 700105 HCHG RX REV CODE 258: Performed by: STUDENT IN AN ORGANIZED HEALTH CARE EDUCATION/TRAINING PROGRAM

## 2024-04-29 PROCEDURE — 74177 CT ABD & PELVIS W/CONTRAST: CPT

## 2024-04-29 PROCEDURE — 700102 HCHG RX REV CODE 250 W/ 637 OVERRIDE(OP): Performed by: STUDENT IN AN ORGANIZED HEALTH CARE EDUCATION/TRAINING PROGRAM

## 2024-04-29 PROCEDURE — 83605 ASSAY OF LACTIC ACID: CPT

## 2024-04-29 PROCEDURE — 80053 COMPREHEN METABOLIC PANEL: CPT

## 2024-04-29 PROCEDURE — 85025 COMPLETE CBC W/AUTO DIFF WBC: CPT

## 2024-04-29 PROCEDURE — 71045 X-RAY EXAM CHEST 1 VIEW: CPT

## 2024-04-29 PROCEDURE — 700117 HCHG RX CONTRAST REV CODE 255: Performed by: STUDENT IN AN ORGANIZED HEALTH CARE EDUCATION/TRAINING PROGRAM

## 2024-04-29 RX ORDER — SODIUM CHLORIDE, SODIUM LACTATE, POTASSIUM CHLORIDE, CALCIUM CHLORIDE 600; 310; 30; 20 MG/100ML; MG/100ML; MG/100ML; MG/100ML
1000 INJECTION, SOLUTION INTRAVENOUS ONCE
Status: COMPLETED | OUTPATIENT
Start: 2024-04-29 | End: 2024-04-29

## 2024-04-29 RX ORDER — CEFDINIR 300 MG/1
300 CAPSULE ORAL ONCE
Status: COMPLETED | OUTPATIENT
Start: 2024-04-29 | End: 2024-04-29

## 2024-04-29 RX ORDER — CEFDINIR 300 MG/1
300 CAPSULE ORAL 2 TIMES DAILY
Qty: 14 CAPSULE | Refills: 0 | Status: ACTIVE | OUTPATIENT
Start: 2024-04-29 | End: 2024-05-03

## 2024-04-29 RX ADMIN — IOHEXOL 100 ML: 350 INJECTION, SOLUTION INTRAVENOUS at 19:06

## 2024-04-29 RX ADMIN — SODIUM CHLORIDE, POTASSIUM CHLORIDE, SODIUM LACTATE AND CALCIUM CHLORIDE 1000 ML: 600; 310; 30; 20 INJECTION, SOLUTION INTRAVENOUS at 19:11

## 2024-04-29 RX ADMIN — CEFDINIR 300 MG: 300 CAPSULE ORAL at 21:00

## 2024-04-29 ASSESSMENT — FIBROSIS 4 INDEX: FIB4 SCORE: 1.27

## 2024-04-29 NOTE — ED TRIAGE NOTES
"Pt comes in by himself  pt is a quad w/ hutchins cath and wound on \"butt area\" this has wound vac applied  home nurse changed dressing today and said it look good  pt c/o high fevers at home that started last night  told to come in  Hx of sepsis    "
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
## 2024-04-30 NOTE — ED NOTES
PT verbalizes understanding of discharge instructions. Yoko lift utilized to assist patient back to own motorized wheelchair.

## 2024-04-30 NOTE — DISCHARGE INSTRUCTIONS
Please take your antibiotics twice daily for the next 7 days for treatment of your urinary tract infection.  Please follow-up with your wound care team and primary care doctor for referral to infectious disease, orthopedic surgery and to discuss needing ongoing antibiotics for your osteomyelitis as we discussed.

## 2024-05-01 ENCOUNTER — HOSPITAL ENCOUNTER (OUTPATIENT)
Dept: LAB | Facility: MEDICAL CENTER | Age: 74
End: 2024-05-01
Attending: NURSE PRACTITIONER
Payer: MEDICARE

## 2024-05-01 ENCOUNTER — OFFICE VISIT (OUTPATIENT)
Dept: WOUND CARE | Facility: MEDICAL CENTER | Age: 74
End: 2024-05-01
Attending: INTERNAL MEDICINE
Payer: MEDICARE

## 2024-05-01 VITALS
TEMPERATURE: 98 F | SYSTOLIC BLOOD PRESSURE: 112 MMHG | DIASTOLIC BLOOD PRESSURE: 78 MMHG | OXYGEN SATURATION: 95 % | RESPIRATION RATE: 20 BRPM | HEART RATE: 88 BPM

## 2024-05-01 DIAGNOSIS — G82.54 QUADRIPLEGIA, C5-C7 INCOMPLETE (HCC): ICD-10-CM

## 2024-05-01 DIAGNOSIS — D53.9 MACROCYTIC ANEMIA: ICD-10-CM

## 2024-05-01 DIAGNOSIS — L89.323 PRESSURE INJURY OF LEFT ISCHIUM, STAGE 3 (HCC): ICD-10-CM

## 2024-05-01 DIAGNOSIS — D69.6 THROMBOCYTOPENIA (HCC): ICD-10-CM

## 2024-05-01 DIAGNOSIS — R73.9 HYPERGLYCEMIA: ICD-10-CM

## 2024-05-01 DIAGNOSIS — R79.89 LOW TSH LEVEL: ICD-10-CM

## 2024-05-01 DIAGNOSIS — L89.314 PRESSURE INJURY OF RIGHT ISCHIUM, STAGE 4 (HCC): ICD-10-CM

## 2024-05-01 DIAGNOSIS — L89.154 SACRAL DECUBITUS ULCER, STAGE IV (HCC): ICD-10-CM

## 2024-05-01 DIAGNOSIS — T81.31XD POSTOPERATIVE WOUND DEHISCENCE, SUBSEQUENT ENCOUNTER: ICD-10-CM

## 2024-05-01 DIAGNOSIS — I10 PRIMARY HYPERTENSION: ICD-10-CM

## 2024-05-01 DIAGNOSIS — M86.9 OSTEOMYELITIS OF LEFT LOWER EXTREMITY (HCC): ICD-10-CM

## 2024-05-01 DIAGNOSIS — L89.893 PRESSURE INJURY OF LEFT LEG, STAGE 3 (HCC): ICD-10-CM

## 2024-05-01 DIAGNOSIS — L89.623 PRESSURE INJURY OF LEFT HEEL, STAGE 3 (HCC): ICD-10-CM

## 2024-05-01 DIAGNOSIS — I10 ESSENTIAL HYPERTENSION: Chronic | ICD-10-CM

## 2024-05-01 DIAGNOSIS — T14.8XXA DEEP TISSUE INJURY: ICD-10-CM

## 2024-05-01 DIAGNOSIS — D75.89 MACROCYTOSIS: ICD-10-CM

## 2024-05-01 DIAGNOSIS — L89.894 PRESSURE INJURY OF LEFT FOOT, STAGE 4 (HCC): ICD-10-CM

## 2024-05-01 LAB
ALBUMIN SERPL BCP-MCNC: 3.3 G/DL (ref 3.2–4.9)
ALBUMIN/GLOB SERPL: 0.9 G/DL
ALP SERPL-CCNC: 72 U/L (ref 30–99)
ALT SERPL-CCNC: 5 U/L (ref 2–50)
ANION GAP SERPL CALC-SCNC: 12 MMOL/L (ref 7–16)
AST SERPL-CCNC: 12 U/L (ref 12–45)
BACTERIA UR CULT: NORMAL
BASOPHILS # BLD AUTO: 0.3 % (ref 0–1.8)
BASOPHILS # BLD: 0.03 K/UL (ref 0–0.12)
BILIRUB SERPL-MCNC: 0.4 MG/DL (ref 0.1–1.5)
BUN SERPL-MCNC: 14 MG/DL (ref 8–22)
CALCIUM ALBUM COR SERPL-MCNC: 9.4 MG/DL (ref 8.5–10.5)
CALCIUM SERPL-MCNC: 8.8 MG/DL (ref 8.5–10.5)
CHLORIDE SERPL-SCNC: 96 MMOL/L (ref 96–112)
CHOLEST SERPL-MCNC: 118 MG/DL (ref 100–199)
CO2 SERPL-SCNC: 22 MMOL/L (ref 20–33)
CREAT SERPL-MCNC: 0.68 MG/DL (ref 0.5–1.4)
EOSINOPHIL # BLD AUTO: 0.22 K/UL (ref 0–0.51)
EOSINOPHIL NFR BLD: 1.9 % (ref 0–6.9)
ERYTHROCYTE [DISTWIDTH] IN BLOOD BY AUTOMATED COUNT: 62.3 FL (ref 35.9–50)
GFR SERPLBLD CREATININE-BSD FMLA CKD-EPI: 98 ML/MIN/1.73 M 2
GLOBULIN SER CALC-MCNC: 3.8 G/DL (ref 1.9–3.5)
GLUCOSE SERPL-MCNC: 108 MG/DL (ref 65–99)
HCT VFR BLD AUTO: 39.8 % (ref 42–52)
HDLC SERPL-MCNC: 45 MG/DL
HGB BLD-MCNC: 12.6 G/DL (ref 14–18)
IMM GRANULOCYTES # BLD AUTO: 0.11 K/UL (ref 0–0.11)
IMM GRANULOCYTES NFR BLD AUTO: 1 % (ref 0–0.9)
LDLC SERPL CALC-MCNC: 56 MG/DL
LYMPHOCYTES # BLD AUTO: 0.93 K/UL (ref 1–4.8)
LYMPHOCYTES NFR BLD: 8.2 % (ref 22–41)
MCH RBC QN AUTO: 31.7 PG (ref 27–33)
MCHC RBC AUTO-ENTMCNC: 31.7 G/DL (ref 32.3–36.5)
MCV RBC AUTO: 100.3 FL (ref 81.4–97.8)
MONOCYTES # BLD AUTO: 0.66 K/UL (ref 0–0.85)
MONOCYTES NFR BLD AUTO: 5.8 % (ref 0–13.4)
NEUTROPHILS # BLD AUTO: 9.43 K/UL (ref 1.82–7.42)
NEUTROPHILS NFR BLD: 82.8 % (ref 44–72)
NRBC # BLD AUTO: 0 K/UL
NRBC BLD-RTO: 0 /100 WBC (ref 0–0.2)
PLATELET # BLD AUTO: 173 K/UL (ref 164–446)
PMV BLD AUTO: 9.5 FL (ref 9–12.9)
POTASSIUM SERPL-SCNC: 3.8 MMOL/L (ref 3.6–5.5)
PROT SERPL-MCNC: 7.1 G/DL (ref 6–8.2)
RBC # BLD AUTO: 3.97 M/UL (ref 4.7–6.1)
SIGNIFICANT IND 70042: NORMAL
SITE SITE: NORMAL
SODIUM SERPL-SCNC: 130 MMOL/L (ref 135–145)
SOURCE SOURCE: NORMAL
TRIGL SERPL-MCNC: 83 MG/DL (ref 0–149)
TSH SERPL DL<=0.005 MIU/L-ACNC: 1.44 UIU/ML (ref 0.38–5.33)
VIT B12 SERPL-MCNC: 275 PG/ML (ref 211–911)
WBC # BLD AUTO: 11.4 K/UL (ref 4.8–10.8)

## 2024-05-01 PROCEDURE — 3074F SYST BP LT 130 MM HG: CPT | Performed by: NURSE PRACTITIONER

## 2024-05-01 PROCEDURE — 11042 DBRDMT SUBQ TIS 1ST 20SQCM/<: CPT | Mod: 59 | Performed by: NURSE PRACTITIONER

## 2024-05-01 PROCEDURE — 11043 DBRDMT MUSC&/FSCA 1ST 20/<: CPT | Performed by: NURSE PRACTITIONER

## 2024-05-01 PROCEDURE — 3078F DIAST BP <80 MM HG: CPT | Performed by: NURSE PRACTITIONER

## 2024-05-01 NOTE — PROGRESS NOTES
Provider Encounter- Pressure Injury        HISTORY OF PRESENT ILLNESS  Wound History:    START OF CARE IN CLINIC: 1/31/2024 (return to clinic after hospitalization)    REFERRING PROVIDER: Racquel York       WOUNDS-superior coccyx pressure injury, stage IV                                 Coccyx pressure injury, stage IV           Inferior coccyx pressure injury, stage IV                                 Right ischial pressure injury, stage IV                                 Left heel pressure injury, recurring stage III                                 Left posterior lower leg/calf-stage III                                 Left medial lower leg surgical wound dehiscence-resolved, reopens frequently            Left ischial pressure injury, stage III-first observed in clinic 5/1/2024          HISTORY: Patient with history of incomplete quadriplegia referred to [facility] for treatment of a stage IV pressure injury.  He has a history of previous pressure injuries to this area, and underwent muscle flaps in 2019, and then again in 2020.  He was seen in the wound clinic in November 2021 for an ulcer proximal from his current ulcer, and pressure injuries to his left posterior lower leg and left heel.  At that time, it was discovered that the patient had retained VAC foam embedded in the wound bed of the sacral wound.  Attempts were made to get him back to his plastic surgeon, though unsuccessful.  In January he underwent surgical removal of VAC sponge along with excisional debridement of his sacral wound by Dr. Chaves.  After the surgery, his wound went on to heal without incident.   In early April 2022, his home health nurse noted a new sacral ulcer, below the previous ulcer which quickly tripled in size over the following weeks.  The ulcer to his left medial lower leg had also deteriorated, with bone visible at the base..  He was hospitalized from 4/22 until 4/27/2022 and underwent surgery with Dr. Raman on 4/26 for  irrigation and debridement of multiple compartments of the left lower extremity, bone excision, and complex closure of chronic wound using biologic skin substitute.   His sacrococcygeal wound was not surgically addressed during this admission.  He was discharged back to his group home, with home health, and referral to outpatient wound clinic for his sacral wound.  He was instructed to follow-up with his surgeon for his lower leg wound.       Postoperatively, the left medial lower leg incision dehisced.  He was seen by his surgeon at [facility] on 5/11.  The surgeon opted to leave remaining sutures in place, and refer him to the wound clinic for treatment of this wound.   Treatment of this wound was initiated in clinic on 5/12.  During this visit was also noted that his heel DTI had resolved, but that he had a new pressure injury to his left posterior lower extremity.     A new pressure injury was noted to patient's right upper buttock/lower back on 5/20/2022.  Wound was linear in shape, skin discolored but intact.     Abrasion noted to left anterior lower leg.  First observed in clinic on 7/22/2022.  Patient states he bumped his leg into his food tray.     Small DTI noted to patient's left lateral lower leg on 7/29/2022.  Skin intact but discolored.     Large area of deep tissue injury noted to patient's left exterior lower leg.  Patient denied any trauma to this area.  Skin intact.  Wound documented.    1/27/2023: Patient was admitted to [facility] [place] 1/23-1/25/2023 with gross hematuria. He underwent RICHARD which showed watchman device was in place and he was taken off of Xarelto. While hospitalized wound team was consulted. He was referred back to [facility] and home health upon discharge.    Patient was hospitalized at [facility] for pyelonephritis from 2/26 until 3/2/2023, admitted for fever and general malaise.  He was admitted and initially started on linezolid and meropenem for suspected UTI and history of multidrug-resistant  organisms.  Urine cultures were negative. ID was consulted, recommended CT of chest and abdomen,which were negative for acute findings. However, he was treated with 5 days total course of antibiotics for suspected UTI, and symptoms completely resolved.  During this admission, the inpatient wound team was consulted for treatment of his sacral and lower leg wounds.  A wound culture was taken from his left heel pressure ulcer, negative.  Once stabilized, he was discharged home and referred back to [facility] to resume treatment of his wounds.    Patient was hospitalized at [facility] from 12/11 until 12/23/2023, admitted for fever.  Wound infection suspected.  CT scan of abdomen and pelvis for evaluation of sacral pressure injury showed gas tracking down to the bone consistent with osteomyelitis.  He underwent I&D of right ischial ulcer (documented as buttock) with Dr. Bansal, medial tract leading to an abscess was identified.  Cavity was opened allowing it to drain into the main wound bed.  Wound VAC was placed and managed by wound team during this admission.  A bone scan of patient's left foot was also done, initially concerning for osteomyelitis.  Orthopedic surgery was consulted and did not recommend surgical intervention. ID consulted also, recommended the patient to receive IV ertapenem 1 g every 24 hours plus IV daptomycin 8 mg/kg every 24 hours through 1/22/2024.   He was discharged to LTAC on 1/23 for IV antibiotics and wound care.  From the LTAC he was discharged home on 1/22 with home health and referral back to [facility] to resume management of his wounds  .      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  Never used smokeless tobacco    Patient's problem list, allergies, and current medications reviewed and updated in Epic    Interval History:  Interval History thinned 7/29/2022.  Please see previous notes for complete interval history.   Interval History thinned 1/27/2023. Please see previous note for complete interval history.  Interval History thinned 3/3/2023.  Please see previous notes for complete interval history.    Interval History thinned 8/4/2023.  Please see previous notes for complete interval history.    Interval History thinned 1/31/2024.  Please see previous notes for complete interval history.      1/31/2024 Initial clinic visit with ADILSON Arthur, HOLDEN, CWDINESHN, CFCN.   Patient returns to clinic after hospitalization and LTAC stay.  VAC in place to right ischial wound.  Sacral wounds have progressed significantly since he was last seen in clinic.  Left medial wound has healed, though covered with friable tissue.  Left heel ulcer, previously resolved has reopened slightly.  He states that he is feeling well overall, denies fevers, chills, nausea, vomiting, cough or shortness of breath.     2/7/2024: Clinic visit with Steve Hodges MD. Patient reports feeling in normal state of health. Patient denies any alarms from wound VAC, however today he presented with significant odor from ischial wound and dressing was partially avulsed leaving in adequate suction. Machine was checked and functioning well, there were no recorded alarms.    2/16/24: Clinic visit with Dana DE ANDA, HOLDEN, CWON, CFCN.  Pt denies fevers, chills, nausea, vomiting.  Left shin fluctuance to wound with hematoma and dark sanguinous drainage.  Bone fragment removed during debridement.  Obtain x-ray as residual bone palpated.  [facility] ordered through [facility].  Coccyx wound has decreased from 3 ulcers to now 1.  Right ischial wound with slough, odor.  Dakin soaked performed during visit.  Wound  debrided.  Able to continue VAC postdebridement.     2/21/2024: Clinic visit with Steve Hodges MD. Patient reports doing ok, reports feeling well. Left medial lower extremity wound is measuring larger with thick slough and friable tissue. Xray completed today, will undoubtedly demonstrate known OM. Patient denies any issues with wound VAC. Home health has been soaking Ischial wound with Dakins prior to applying wound VAC.    2/28/2024: Clinic visit with Steve Hodges MD. Patient recently tested positive for COVID. Reports some congestion, cough, and brain fog. Denies other symptoms. Patient with new wounds to left lower extremity undoubtedly associated with known underlying OM. Patient's sacral and ischial wound appear to be improving, measuring smaller.    3/6/2024: Clinic visit with Steve Hodges MD. Patient reports feeling much better, COVID symptoms have resolved. Patient's left lower extremity wounds are stable, posterior leg wounds appear resolved. Patient sacral wound is stable and ischial wound is improving.    3/13/2024 : Clinic visit with ADILSON Arthur, FNP-BC, CWOCN, CFCN.   Patient states he is feeling well, offers no complaints.  Left lower extremity wounds continue to wax and wane.  Sacral and ischial wounds slowly progressing.    3/20/2024: Clinic visit with Steve Hodges MD. Patient reports doing ok. Denies any acute issues. Leg wounds continue to wax and wane. He reports there was more slough and increased odor from ischial wound so home health packed with dakins prior to applying VAC. No odor today in clinic.    3/27/2024: Clinic visit with Steve Hodges MD. Patient reports doing ok. Unfortunately home health did not pre-drape bridging the trac pad from wound VAC and right buttocks / hip skin is irritated and at risk of breaking down. Left leg wounds have improved. Sacral and ischial pressure injuries are stable.    4/3/2024: Clinic visit with Steve Hodges MD. Patient reports  doing well, denies any acute issues. Leg wounds are all improving, few new pinpoint areas of skin breakdown. Sacral and ischial pressure injuries slightly improved. Skin on right buttocks and hip has improved and did not fully breakdown.    4/10/2024 : Clinic visit with ADILSON Arthur, HOLDEN, IMAN, LILIYA.   Patient continues to feel well.  Today, all of his left lower extremity wounds are healed, though historically have tended to open and close.  Sacral and ischial wounds both measure bit larger today.    4/17/2024 : Clinic visit with ADILSON Arthur, HOLDEN, LILIYA VALERIO.   Anjum states he is feeling very well.  Left lower extremity wounds remain closed.  Sacral/coccyx ulcers and right ischial ulcer continue to wax and wane.      4/24/2024 : Clinic visit with ADILSON Arthur, HOLDEN, LILIYA VALERIO.   Anjum states he is feeling well.  He is left medial lower leg wound, and left heel wounds have reopened slightly, neither of which required excisional debridement today.  Sacral and ischial wounds are slowly progressing.   He has not yet obtained quarter strength Dakin's for soaks with VAC dressing change.  He decided to order from [employer].  However, I also sent an order to [facility] last week.    5/1/2024 : Clinic visit with ADILSON Arthur, HOLDEN, IMAN, LILIYA.   Anjum is not feeling well, very fatigued.  He is again COVID-positive, was in the ED 2 nights ago with fever and chills.  Chest x-ray showed no evidence of pneumonia.  A CT of his abdomen and pelvis was also done, showed apparent bone resorption of the ischial tuberosity, suggesting osteomyelitis-this is not a new finding.  Patient was treated for ischial OM in December 2023 and January of this year.  He was discharged home on cefdinir.   Today he is afebrile, but states he has been extremely fatigued.  He admits to sitting in his wheelchair more than usual.  He does have a new pressure injury to his left ischium, treatment initiated in  clinic today.    REVIEW OF SYSTEMS:   Unchanged from previous wound clinic assessment on 4/24/2024, except as noted in interval history above        PHYSICAL EXAMINATION:   /78   Pulse 88   Temp 36.7 °C (98 °F) (Temporal)   Resp 20   SpO2 95%   Physical Exam  Constitutional:       Appearance: He is obese.   Cardiovascular:      Rate and Rhythm: Normal rate.   Pulmonary:      Effort: Pulmonary effort is normal.   Abdominal:      Comments: Colostomy left lower quadrant   Genitourinary:     Comments: Suprapubic catheter to down drain   Skin:     Comments: Stage IV coccygeal pressure injury -wounds tend to wax and wane.  Presents today with just 1 open wound, area has not changed significantly.  Moderate serosanguineous drainage.  No evidence of infection    Stage IV pressure injury to right ischium-no significant change in area, depth, or depth of undermining.  Moderate serosanguineous drainage, no odor.  No evidence of infection    Full-thickness wound to left medial lower leg, surgical wound dehiscence-small open wound with poor tissue quality.  Area has not changed significantly over the past week.  Periwound with friable scar tissue    Stage III pressure injury left posterior heel -appears to have healed again, with fragile epithelium, no drainage.    Stage III pressure injury to posterior left lower leg-remains resolved with discoloration of intact skin    Stage III pressure injury of left ischium-first observed in clinic today, wound bed with adherent slough, moderate serosanguineous drainage, no odor.  No evidence of infection     Neurological:      Mental Status: He is alert and oriented to person, place, and time.   Psychiatric:         Mood and Affect: Mood normal.         WOUND ASSESSMENT  Wound 12/12/23 Pressure Injury Ischium Right (Active)   Wound Image   05/01/24 1600   Site Assessment Red;[place] 05/01/24 1600   Periwound Assessment Scar tissue 05/01/24 1600   Margins Unattached edges 05/01/24  1600   Drainage Amount Large 05/01/24 1600   Drainage Description Serosanguineous 05/01/24 1600   Treatments Cleansed;Provider debridement;Site care 05/01/24 1600   Offloading/DME Other (comment) 03/27/24 1625   Wound Cleansing Hypochlorus Acid 05/01/24 1600   Periwound Protectant Skin Protectant Wipes to Periwound;Drape 05/01/24 1600   Dressing Changed Changed 05/01/24 1600   Dressing Cleansing/Solutions Not Applicable 05/01/24 1600   Dressing Options Wound Vac;Offloading Dressing - Sacral 05/01/24 1600   Dressing Change/Treatment Frequency Monday, Wednesday, Friday, and As Needed 05/01/24 1600   Wound Team Following Weekly 05/01/24 1600   WOUND NURSE ONLY - Pressure Injury Stage 4 05/01/24 1600   Non-staged Wound Description Not applicable 05/01/24 1600   Wound Length (cm) 3.3 cm 05/01/24 1600   Wound Width (cm) 3.5 cm 05/01/24 1600   Wound Depth (cm) 1.9 cm 05/01/24 1600   Wound Surface Area (cm^2) 11.55 cm^2 05/01/24 1600   Wound Volume (cm^3) 21.945 cm^3 05/01/24 1600   Post-Procedure Length (cm) 3.1 cm 04/24/24 1530   Post-Procedure Width (cm) 3.1 cm 04/24/24 1530   Post-Procedure Depth (cm) 2.5 cm 04/24/24 1530   Post-Procedure Surface Area (cm^2) 9.61 cm^2 04/24/24 1530   Post-Procedure Volume (cm^3) 24.025 cm^3 04/24/24 1530   Wound Healing % 61 05/01/24 1600   Wound Bed Granulation (%) 100 % 03/06/24 1000   Tunneling (cm) 0 cm 05/01/24 1600   Tunneling Clock Position of Wound 2 03/06/24 1000   Undermining (cm) 3.5 cm 05/01/24 1600   Undermining of Wound, 1st Location From 12 o'clock;To 12 o'clock 05/01/24 1600   Undermining (cm) - 2nd location 2.5 cm 02/16/24 1620   Undermining of Wound, 2nd Location From 7 o'clock;To 11 o'clock 02/16/24 1620   Wound Odor Mild 05/01/24 1600   Exposed Structures Fascia 05/01/24 1600   Number of days: 141       Wound 01/31/24 Pressure Injury Coccyx Inferior wound, previosly closed but reopened 1/31/24 (Active)   Wound Image   05/01/24 1600   Site Assessment Red;Pink 05/01/24  1600   Periwound Assessment Maceration;Scar tissue 05/01/24 1600   Margins Attached edges 05/01/24 1600   Drainage Amount Scant 05/01/24 1600   Drainage Description Serosanguineous 05/01/24 1600   Treatments Cleansed;Site care 05/01/24 1600   Wound Cleansing Hypochlorus Acid 05/01/24 1600   Periwound Protectant Skin Protectant Wipes to Periwound 05/01/24 1600   Dressing Cleansing/Solutions Not Applicable 05/01/24 1600   Dressing Options Hydrofiber Silver;Offloading Dressing - Heel 05/01/24 1600   Dressing Change/Treatment Frequency Monday, Wednesday, Friday, and As Needed 05/01/24 1600   Wound Team Following Weekly 03/20/24 1500   WOUND NURSE ONLY - Pressure Injury Stage 3 05/01/24 1600   Wound Length (cm) 0.5 cm 05/01/24 1600   Wound Width (cm) 0.7 cm 05/01/24 1600   Wound Depth (cm) 0.3 cm 05/01/24 1600   Wound Surface Area (cm^2) 0.35 cm^2 05/01/24 1600   Wound Volume (cm^3) 0.105 cm^3 05/01/24 1600   Post-Procedure Length (cm) 0.8 cm 04/24/24 1530   Post-Procedure Width (cm) 1 cm 04/24/24 1530   Post-Procedure Depth (cm) 0.4 cm 04/24/24 1530   Post-Procedure Surface Area (cm^2) 0.8 cm^2 04/24/24 1530   Post-Procedure Volume (cm^3) 0.32 cm^3 04/24/24 1530   Wound Healing % 69 05/01/24 1600   Wound Bed Slough (%) 30 % 03/06/24 1000   Tunneling (cm) 0 cm 05/01/24 1600   Undermining (cm) 0 cm 05/01/24 1600   Undermining of Wound, 1st Location From 11 o'clock;To 1 o'clock 03/13/24 1545   Wound Odor None 05/01/24 1600   Exposed Structures None 05/01/24 1600   Number of days: 91       Wound 02/16/24 Full Thickness Wound Leg Medial Left (Active)   Wound Image   05/01/24 1600   Site Assessment Red;Purple 05/01/24 1600   Periwound Assessment Ecchymosis;Edema 05/01/24 1600   Margins Attached edges 05/01/24 1600   Drainage Amount Scant 05/01/24 1600   Drainage Description Serosanguineous 05/01/24 1600   Treatments Cleansed;Site care 05/01/24 1600   Wound Cleansing Hypochlorus Acid 05/01/24 1600   Periwound Protectant Skin  Protectant Wipes to Periwound;Barrier Paste 05/01/24 1600   Dressing Cleansing/Solutions Not Applicable 05/01/24 1600   Dressing Options Offloading Dressing - Heel;Offloading Dressing - Sacral 05/01/24 1600   Dressing Change/Treatment Frequency Monday, Wednesday, Friday, and As Needed 05/01/24 1600   Wound Team Following Weekly 03/20/24 1500   Non-staged Wound Description Full thickness 05/01/24 1600   Wound Length (cm) 0.3 cm 04/24/24 1530   Wound Width (cm) 0.3 cm 04/24/24 1530   Wound Depth (cm) 0.1 cm 04/24/24 1530   Wound Surface Area (cm^2) 0.09 cm^2 04/24/24 1530   Wound Volume (cm^3) 0.009 cm^3 04/24/24 1530   Post-Procedure Length (cm) 0.3 cm 04/24/24 1530   Post-Procedure Width (cm) 0.3 cm 04/24/24 1530   Post-Procedure Depth (cm) 0.1 cm 04/24/24 1530   Post-Procedure Surface Area (cm^2) 0.09 cm^2 04/24/24 1530   Post-Procedure Volume (cm^3) 0.009 cm^3 04/24/24 1530   Wound Healing % 100 04/24/24 1530   Tunneling (cm) 0 cm 04/24/24 1530   Undermining (cm) 0 cm 04/24/24 1530   Wound Odor None 04/24/24 1530   Exposed Structures None 04/24/24 1530   Number of days: 75       Wound 05/01/24 Pressure Injury Ischium Left (Active)   Wound Image    05/01/24 1600   Site Assessment Yellow;Red;Slough 05/01/24 1600   Periwound Assessment Scar tissue 05/01/24 1600   Margins Attached edges 05/01/24 1600   Closure Secondary intention 05/01/24 1600   Drainage Amount Small 05/01/24 1600   Drainage Description Sanguineous 05/01/24 1600   Treatments Cleansed;Site care 05/01/24 1600   Wound Cleansing Hypochlorus Acid 05/01/24 1600   Periwound Protectant Skin Protectant Wipes to Periwound 05/01/24 1600   Dressing Status Intact 05/01/24 1600   Dressing Changed New 05/01/24 1600   Dressing Cleansing/Solutions Not Applicable 05/01/24 1600   Dressing Options Triad Hydro;Hydrocolloid Thick;Offloading Dressing - Sacral 05/01/24 1600   Dressing Change/Treatment Frequency Monday, Wednesday, Friday, and As Needed 05/01/24 1600   Wound  "Team Following Weekly 05/01/24 1600   WOUND NURSE ONLY - Pressure Injury Stage 2 05/01/24 1600   Non-staged Wound Description Full thickness 05/01/24 1600   Wound Length (cm) 1.1 cm 05/01/24 1600   Wound Width (cm) 1 cm 05/01/24 1600   Wound Depth (cm) 0.2 cm 05/01/24 1600   Wound Surface Area (cm^2) 1.1 cm^2 05/01/24 1600   Wound Volume (cm^3) 0.22 cm^3 05/01/24 1600   Post-Procedure Length (cm) 1.1 cm 05/01/24 1600   Post-Procedure Width (cm) 1.5 cm 05/01/24 1600   Post-Procedure Depth (cm) 0.2 cm 05/01/24 1600   Post-Procedure Surface Area (cm^2) 1.65 cm^2 05/01/24 1600   Post-Procedure Volume (cm^3) 0.33 cm^3 05/01/24 1600   Tunneling (cm) 0 cm 05/01/24 1600   Undermining (cm) 0 cm 05/01/24 1600   Wound Odor None 05/01/24 1600   Number of days: 0       PROCEDURE: Excisional debridement of right ischial ulcer   -Curette used to debride wound bed. Excisional debridement was performed to remove devitalized tissue until healthy, bleeding tissue was visualized.  Total wound area debrided approximately 10.0 cm².  Tissue debrided into the muscle / fascia layer  -Bleeding controlled with manual pressure.    -Wound care completed by wound RN, refer to flowsheet  -Patient tolerated the procedure well, without c/o pain or discomfort.      PROCEDURE:   -2% viscous lidocaine applied topically to wound bed for approximately 5 minutes prior to debridement  -Curette used to debride wound bed.  Excisional debridement was performed to remove devitalized tissue until healthy, bleeding tissue was visualized.   Entire surface of wound, 1.65 cm² debrided.  Tissue debrided into the subcutaneous layer.    -Bleeding controlled with manual pressure.    -Wound care completed by wound RN, refer to flowsheet  -Patient tolerated the procedure well, without c/o pain or discomfort.      Pertinent Labs and Diagnostics:    Labs:     A1c: No results found for: \"HBA1C\"     [facility] results, 7/1/2022 (under media tab)    CRP 13    ESR " 31      IMAGING:     X-ray left tib-fib ordered 2/16/2024 through [facility]    12/11/2023-CT of abdomen pelvis with contrast  IMPRESSION:   1.  Right ischial decubitus ulcer extending to bone with soft tissue gas tracking along the right perineum. Appearance suggesting osteomyelitis, consider component of necrotizing fasciitis as clinically appropriate.  2.  Small pericardial effusion  3.  Left adrenal nodule, density on prior noncontrast CT demonstrates adenoma.  4.  Hepatomegaly  5.  Enlarged prostate, workup and evaluation for causes of prostate enlargement recommended as clinically appropriate.  6.  Atherosclerosis and atherosclerotic coronary artery disease    12/15/2023-bone scan of left foot  IMPRESSION:     1.  Mild increased activity in the LEFT 1st and 3rd toes on blood pool and delayed images possibly indicating inflammation/infection.  2.  No significant blood flow asymmetry.          VASCULAR STUDIES: No results found.    LAST  WOUND CULTURE:   Lab Results   Component Value Date/Time    CULTRSULT  04/29/2024 08:01 PM     3 or more organisms isolated, culture of doubtful  significance, please recollect.  Mixed enteric ade >100,000 cfu/mL        PATHOLOGY  2/17/2023-bone fragment extracted from left lower extremity wound\\  FINAL DIAGNOSIS:     A. Left leg bone fragment at base of chronic wound:          Extensively degenerated fibrocartilaginous tissue with a rim of           fibrinopurulent debris          Correlate with culture findings            Comment: While no residual intact bone is identified, these           findings are suggestive of adjacent osteomyelitis.      ASSESSMENT AND PLAN:     1. Sacral decubitus ulcer, stage IV ([facility])  Comments: Ulcer first noted in early April 2022 as small open area which quickly enlarged.  This ulcer is present distal from previous sacral ulcer which healed after surgery in January.  Patient has history of flap reconstruction x2 to this  area.    5/1/2024: This wound remains unchanged from last week.  Historically has waxed and waned.    -No excisional debridement required today  -Patient to return to clinic weekly for assessment and debridement  -Home health to change dressing 2 times per week   -Patient does spend a lot of time up in his wheelchair, and wishes to continue to do so for his quality of life.  He lives independently, drives, and is involved with family activities.  He does have an appropriate pressure-relief cushion and is very well versed on pressure relieving techniques.  - Known OM that was previously treated.  CT scan done during recent hospitalization did not show OM of sacrum, however OM of the ischium noted.    Wound care: Silver Hydrofiber to manage exudate and bioburden, sacral foam cover dressing    2.  Pressure injury of right ischium, stage IV  Comments: Abscess and OM found on CT during hospitalization in December 2023.  Patient underwent I&D with VAC placement.  IV antibiotics through 1/22/2024.    5/1/2024: No significant change in wound area or depth.  Patient was in the ED on 4/29 with fever and chills.  Among other diagnostics, CT of abdomen and pelvis was repeated, showing OM of ischium.  He has already been treated with IV antibiotics for previous finding of the same.  - Excisional debridement of nonviable tissue from wound in clinic today, medically necessary to promote wound healing.  -Home health has been instructed to continue Dakins soaked gauze to wound for 10 to 15 minutes prior to placement of new VAC dressing as needed for odor / slough.  -Continue wound VAC to ischial wound  -Patient to return to clinic weekly for assessment and debridement  -Patient admits that he has been up in his wheelchair for longer than usual, due to increased fatigue, possibly related to COVID  -Home health to change dressing 2 times per week in between clinic visits   -Patient is very well versed on pressure relief measures, has  adequate surfaces in wheelchair and on his bed.    Wound care:  NPWT at -125 mmHg to accelerate granulation, change 3 times per week, sacral offloading foam.    3. Postoperative wound dehiscence, subsequent encounter  4. Osteomyelitis of left lower extremity  Comments: On 4/26/2022 patient underwent irrigation and debridement of multiple compartments of the left lower extremity states for pressure injury, with bone excision, and complex closure of chronic wound using biologic skin substitute.  During postop visit in surgeons office on 5/11, surgical site was noted to be dehisced.      5/1/2024: Small open area to left medial lower leg wound, tissue friable.  This wound is waxed and waned, known underlying osteomyelitis.  - X-ray left tib-fib inconclusive  -No excisional debridement of these wounds required today  -Patient to be mindful of offloading when supine, should assure that his leg is not rotating medially    Wound care: Silicone foam dressing, Tubigrip D    5. Deep tissue injury  6. Pressure injury of left foot, stage IV  Comments: DTI to posterior left lower leg first observed in clinic 7/29/2022.  Patient does not know how it started, had been wearing heel float boot consistently.  Evolved into stage IV pressure injury    5/1/2024: Both wounds remain healed.    -High risk for recurrence, has opened and closed several times before  -Monitor site each clinic visit     Care: Open to air    7. Pressure injury of left heel, stage 3 ([facility])  Comments: First noted in clinic on 10/21/2022, originally noted to be stage II    5/1/2024: Wound appears to be healed again, covered with fragile epithelium  -Resume topical therapy  -Patient to return to clinic weekly for assessment   -Prevalon boot to alleviate pressure      Wound care: Silicone heel offloading dressing    8.  Pressure injury of left leg, stage III    5/1/2024: Remains healed, covered with thin, fragile epithelium with discoloration.  Skin appears is intact  with no drainage.  -Wound is closed, however historically has closed and recurred multiple times  -Monitor each clinic visit       Wound Care: Open to air, Tubigrip to manage edema    9.  Pressure injury of left ischium, stage III    5/1/2024: Patient presents today with a new pressure injury to his left ischium, noted to be stage III after debridement.  He admits that he has been up in his wheelchair for longer periods of time, falling asleep due to fatigue secondary to COVID.    -Excisional debridement of wound in clinic today, medically necessary to promote wound healing.  -Patient to return to clinic weekly for assessment and debridement  -Home health to change dressing 1-2 times per week in between clinic visits     Wound care: Triad, thick hydrocolloid for autolytic debridement    10. Quadriplegia, C5-C7, incomplete (HCC)  Comments: Complicating factor.  Impaired mobility and sensation  -Patient is still spending 7-8 hours/day up in his wheelchair, though he does have appropriate offloading cushion, and knows to reposition frequently.  Wears heel float boots bilaterally at all times  Wound care: Silver Hydrofiber to manage exudate and bioburden, foam cover dressing, Hypafix tape     Please note that this note may have been created using voice recognition software. I have worked with technical experts from [employer] to optimize the interface.  I have made every reasonable attempt to correct obvious errors, but there may be errors of grammar and possibly content that I did not discover before finalizing the note.

## 2024-05-01 NOTE — PATIENT INSTRUCTIONS
-Keep your wound dressing clean, dry, and intact. Only change dressing if it's over saturated, soiled or falls off.     -Change your dressing if it becomes soiled, soaked, or falls off.    -Wound vac may not have any drainage in tube or cannister & it will still be working.   Change cannister if it does become full by pressing tab on side of machine to remove canister and snap on new one. Full canister can be thrown in the trash. If cannister fills with bright red blood - go to ER. Dressing will be changed every MWF at the wound clini.  If you are having issues with your wound VAC, please consider patching leaks, changing the canister, or calling [phone] for troubleshooting. If the wound VAC has been off or un-operational for over 2 hours, call wound care center to inform them and remove all dressings including black foam and replace with normal saline damp gauze.     -Should you experience any significant changes in your wound(s), such as infection (redness, swelling, localized heat, increased pain, fever > 101 F, chills) or have any questions regarding your home care instructions, please contact the wound center at [phone]. If after hours, contact your primary care physician or go to the hospital emergency room.

## 2024-05-03 ENCOUNTER — HOSPITAL ENCOUNTER (EMERGENCY)
Facility: MEDICAL CENTER | Age: 74
End: 2024-05-03
Attending: STUDENT IN AN ORGANIZED HEALTH CARE EDUCATION/TRAINING PROGRAM
Payer: MEDICARE

## 2024-05-03 ENCOUNTER — APPOINTMENT (OUTPATIENT)
Dept: RADIOLOGY | Facility: MEDICAL CENTER | Age: 74
End: 2024-05-03
Attending: STUDENT IN AN ORGANIZED HEALTH CARE EDUCATION/TRAINING PROGRAM
Payer: MEDICARE

## 2024-05-03 VITALS
RESPIRATION RATE: 16 BRPM | TEMPERATURE: 98 F | WEIGHT: 215 LBS | SYSTOLIC BLOOD PRESSURE: 155 MMHG | HEART RATE: 98 BPM | OXYGEN SATURATION: 96 % | DIASTOLIC BLOOD PRESSURE: 72 MMHG | BODY MASS INDEX: 30.85 KG/M2

## 2024-05-03 DIAGNOSIS — L03.115 CELLULITIS OF RIGHT LEG: ICD-10-CM

## 2024-05-03 RX ORDER — CEPHALEXIN 500 MG/1
500 CAPSULE ORAL 4 TIMES DAILY
Qty: 20 CAPSULE | Refills: 0 | Status: ACTIVE | OUTPATIENT
Start: 2024-05-03 | End: 2024-05-03

## 2024-05-03 RX ORDER — AMOXICILLIN AND CLAVULANATE POTASSIUM 875; 125 MG/1; MG/1
1 TABLET, FILM COATED ORAL 2 TIMES DAILY
Qty: 14 TABLET | Refills: 0 | Status: ACTIVE | OUTPATIENT
Start: 2024-05-03 | End: 2024-05-04

## 2024-05-03 RX ORDER — AMOXICILLIN AND CLAVULANATE POTASSIUM 875; 125 MG/1; MG/1
1 TABLET, FILM COATED ORAL ONCE
Status: COMPLETED | OUTPATIENT
Start: 2024-05-03 | End: 2024-05-03

## 2024-05-03 RX ADMIN — AMOXICILLIN AND CLAVULANATE POTASSIUM 1 TABLET: 875; 125 TABLET, FILM COATED ORAL at 20:34

## 2024-05-03 ASSESSMENT — FIBROSIS 4 INDEX: FIB4 SCORE: 2.26

## 2024-05-04 RX ORDER — AMOXICILLIN AND CLAVULANATE POTASSIUM 875; 125 MG/1; MG/1
1 TABLET, FILM COATED ORAL 2 TIMES DAILY
Qty: 14 TABLET | Refills: 0 | Status: ACTIVE | OUTPATIENT
Start: 2024-05-04 | End: 2024-05-29

## 2024-05-04 RX ORDER — AMOXICILLIN AND CLAVULANATE POTASSIUM 875; 125 MG/1; MG/1
1 TABLET, FILM COATED ORAL 2 TIMES DAILY
Qty: 14 TABLET | Refills: 0 | Status: ACTIVE | OUTPATIENT
Start: 2024-05-04 | End: 2024-05-04

## 2024-05-04 NOTE — ED PROVIDER NOTES
ED Provider Note    CHIEF COMPLAINT  Chief Complaint   Patient presents with    Leg Swelling       EXTERNAL RECORDS REVIEWED  Outpatient Notes office note on 4/24/2024 for follow-up for multiple chronic wounds to the coccyx stage IV    HPI/ROS  LIMITATION TO HISTORY   Select: : None  OUTSIDE HISTORIAN(S):  EMS transported the patient for complaint of leg swelling    Osvaldo Pate is a 73 y.o. male who presents with right lower extremity swelling.  Patient denies history of blood clots.  Patient is paraplegic.  Patient reports that he experienced acute onset swelling that he noticed this morning has never had swelling like this before.  Patient  does not have feeling in the right lower extremity or movement at baseline.  Patient reports that he had COVID for the past week and is just getting over that illness.  Patient denies chest pain or shortness of breath.    PAST MEDICAL HISTORY   has a past medical history of A-fib ([facility]), Acute cystitis without hematuria (10/23/2021), Acute UTI (06/18/2023), Arrhythmia, Atrial flutter ([facility]), Blood clotting disorder ([facility]), Bowel habit changes, Clot hematuria (01/23/2023), GERD (gastroesophageal reflux disease), Hypertension, Hypokalemia (06/18/2023), Kidney stones, Metabolic acidosis (06/18/2023), Neurogenic bladder, Open wound (11/18/2022), Quadriplegia, C5-C7 complete ([facility]), Sepsis secondary to UTI ([facility]) (06/17/2023), SIRS (systemic inflammatory response syndrome) ([facility]) (12/12/2023), and Suprapubic catheter ([facility]).    SURGICAL HISTORY   has a past surgical history that includes percutaneouospinning lower extremity; colostomy; colostomy takedown; colostomy (N/A, 07/27/2019); ulcer debridement (N/A, 08/21/2019); flap closure (08/21/2019); hernia repair; irrigation & debridement general (12/20/2020); cystoscopy,insert ureteral stent (Left, 12/16/2021); cysto/uretero/pyeloscopy, dx (Left, 12/16/2021); lasertripsy (Left, 12/16/2021); cystoscopy,insert ureteral stent (Left,  "2022); cysto/uretero/pyeloscopy, dx (Left, 2022); lasertripsy (N/A, 2022); incision and drainage orthopedic (2022); incision and drainage orthopedic (Left, 2022); bone biopsy (Left, 2022); irrigation & debridement general (Left, 2022); wound closure neuro (Left, 2022); orthopedic osteotomy (Left, 2022); orif, ankle; and irrigation & debridement general (Right, 2023).    FAMILY HISTORY  Family History   Problem Relation Age of Onset    Heart Disease Father        SOCIAL HISTORY  Social History     Tobacco Use    Smoking status: Former     Current packs/day: 0.00     Average packs/day: 1 pack/day for 10.0 years (10.0 ttl pk-yrs)     Types: Cigarettes     Start date: 1967     Quit date: 1977     Years since quittin.3    Smokeless tobacco: Never   Vaping Use    Vaping Use: Never used   Substance and Sexual Activity    Alcohol use: Yes     Alcohol/week: 4.2 oz     Types: 7 Standard drinks or equivalent per week     Comment: 2/day    Drug use: No    Sexual activity: Not on file       CURRENT MEDICATIONS  Home Medications    **Home medications have not yet been reviewed for this encounter**         ALLERGIES  Allergies   Allergen Reactions    Sulfa Drugs Rash     Rash, developed this back in  after being placed on \"sulfa antibiotic for my wound\". Antibiotic was stopped and rash went away. Patient states he had a sulfa antibiotic prior to that time back when he was younger w/o a reaction.          PHYSICAL EXAM  VITAL SIGNS: BP (!) 160/88   Pulse 68   Temp 36.7 °C (98 °F) (Temporal)   Resp 18   Wt 97.5 kg (215 lb)   SpO2 96%   BMI 30.85 kg/m²    Vitals and nursing note reviewed.   Constitutional:       Comments: Patient is lying in bed supine, pleasant, conversant, speaking in complete sentences   HENT:      Head: Normocephalic and atraumatic.   Eyes:      Extraocular Movements: Extraocular movements intact.      Conjunctiva/sclera: " Conjunctivae normal.      Pupils: Pupils are equal, round, and reactive to light.   Cardiovascular:      Pulses: Normal pulses.      Comments: HR 68  Pulmonary:      Effort: Pulmonary effort is normal. No respiratory distress.   Musculoskeletal:      Right lower extremity swelling significant compared to the left, erythema, no warmth or crepitus.  2+ DP pulses bilaterally.     Cervical back: Normal range of motion. No rigidity.   Skin:     General: Skin is warm and dry.      Capillary Refill: Capillary refill takes less than 2 seconds.   Neurological:      Mental Status: Alert.  At neurologic baseline of paraplegia.        RADIOLOGY/PROCEDURES   I have independently interpreted the diagnostic imaging associated with this visit and am waiting the final reading from the radiologist.   My preliminary interpretation is as follows: No DVT    Radiologist interpretation:  US-EXTREMITY VENOUS LOWER UNILAT RIGHT   Final Result         1.  No evidence of right lower extremity deep venous thrombosis.          COURSE & MEDICAL DECISION MAKING    ASSESSMENT, COURSE AND PLAN  Care Narrative: No crepitus, afebrile, no tachycardia, necrotizing soft tissue infection inconsistent with patient presentation at this time.  Patient is vascularly intact on exam.  Right lower extremity is warm and well-perfused.  Patient has some erythema but no warmth, afebrile, cellulitis within the differential but less likely at this time.  I am concerned about a DVT given COVID history and immobilization.  Disposition pending ultrasound.    Electronically signed by: Gee Ochoa M.D., 5/3/2024 6:06 PM    No DVT on ultrasound.  Patient with erythema, high risk individual given chronic medical conditions, patient will be covered with antibiotics in the event that patient is suffering from cellulitis to the right lower extremity.  Disposition Home with outpatient follow-up.    Repeat physical exam benign.  I doubt any serious emergency process  at this time.  Patient and/or family, friends given strict return precautions for worsening symptoms and care instructions. They have demonstrated understanding of discharge instructions through teach back mechanism. Advised PCP follow-up in 1-2 days.  Patient/family/friend expresses understanding and agrees to plan.    This dictation has been created using voice recognition software. I am continuously working with the software to minimize the number of voice recognition errors and I have made every attempt to manually correct the errors within my dictation. However errors  related to this voice recognition software may still exist and should be interpreted within the appropriate context.     Electronically signed by: Gee Ochoa M.D., 5/3/2024 8:13 PM      DISPOSITION AND DISCUSSIONS  Escalation of care considered, and ultimately not performed:Laboratory analysis    Decision tools and prescription drugs considered including, but not limited to: Pain Medications   over-the-counter pain medications are appropriate, narcotics not indicated at this time  .    FINAL DIAGNOSIS  1. Cellulitis of right leg           Electronically signed by: Gee Ochoa M.D., 5/3/2024 6:04 PM

## 2024-05-04 NOTE — ED TRIAGE NOTES
Pt presents to triage with c/o right leg swelling that he noticed this AM after getting out of bed. The pt is wheelchair bound and states he has not had a blood clot before. He denies CP. Denies SOB

## 2024-05-04 NOTE — ED NOTES
Rounded on patient. Patient reading from tablet. Call light in reach. No needs at this time. Aware of plan of care.

## 2024-05-08 ENCOUNTER — OFFICE VISIT (OUTPATIENT)
Dept: WOUND CARE | Facility: MEDICAL CENTER | Age: 74
End: 2024-05-08
Attending: INTERNAL MEDICINE
Payer: MEDICARE

## 2024-05-08 VITALS
OXYGEN SATURATION: 97 % | DIASTOLIC BLOOD PRESSURE: 82 MMHG | SYSTOLIC BLOOD PRESSURE: 114 MMHG | RESPIRATION RATE: 18 BRPM | HEART RATE: 56 BPM | TEMPERATURE: 97.1 F

## 2024-05-08 DIAGNOSIS — T14.8XXA DEEP TISSUE INJURY: ICD-10-CM

## 2024-05-08 DIAGNOSIS — L89.894 PRESSURE INJURY OF LEFT FOOT, STAGE 4 (HCC): ICD-10-CM

## 2024-05-08 DIAGNOSIS — M86.9 OSTEOMYELITIS OF LEFT LOWER EXTREMITY (HCC): ICD-10-CM

## 2024-05-08 DIAGNOSIS — L89.893 PRESSURE INJURY OF LEFT LEG, STAGE 3 (HCC): ICD-10-CM

## 2024-05-08 DIAGNOSIS — T81.31XD POSTOPERATIVE WOUND DEHISCENCE, SUBSEQUENT ENCOUNTER: ICD-10-CM

## 2024-05-08 DIAGNOSIS — L89.623 PRESSURE INJURY OF LEFT HEEL, STAGE 3 (HCC): ICD-10-CM

## 2024-05-08 DIAGNOSIS — L89.314 PRESSURE INJURY OF RIGHT ISCHIUM, STAGE 4 (HCC): ICD-10-CM

## 2024-05-08 DIAGNOSIS — L89.323 PRESSURE INJURY OF LEFT ISCHIUM, STAGE 3 (HCC): ICD-10-CM

## 2024-05-08 DIAGNOSIS — L89.154 SACRAL DECUBITUS ULCER, STAGE IV (HCC): ICD-10-CM

## 2024-05-08 DIAGNOSIS — G82.54 QUADRIPLEGIA, C5-C7 INCOMPLETE (HCC): ICD-10-CM

## 2024-05-08 PROCEDURE — 11042 DBRDMT SUBQ TIS 1ST 20SQCM/<: CPT | Mod: 59 | Performed by: STUDENT IN AN ORGANIZED HEALTH CARE EDUCATION/TRAINING PROGRAM

## 2024-05-08 PROCEDURE — 11043 DBRDMT MUSC&/FSCA 1ST 20/<: CPT | Performed by: STUDENT IN AN ORGANIZED HEALTH CARE EDUCATION/TRAINING PROGRAM

## 2024-05-08 PROCEDURE — 3079F DIAST BP 80-89 MM HG: CPT | Performed by: STUDENT IN AN ORGANIZED HEALTH CARE EDUCATION/TRAINING PROGRAM

## 2024-05-08 PROCEDURE — 3074F SYST BP LT 130 MM HG: CPT | Performed by: STUDENT IN AN ORGANIZED HEALTH CARE EDUCATION/TRAINING PROGRAM

## 2024-05-08 NOTE — PROGRESS NOTES
Provider Encounter- Pressure Injury        HISTORY OF PRESENT ILLNESS  Wound History:    START OF CARE IN CLINIC: 1/31/2024 (return to clinic after hospitalization)    REFERRING PROVIDER: Racquel York       WOUNDS-superior coccyx pressure injury, stage IV                                 Coccyx pressure injury, stage IV           Inferior coccyx pressure injury, stage IV                                 Right ischial pressure injury, stage IV                                 Left heel pressure injury, recurring stage III                                 Left posterior lower leg/calf-stage III                                 Left medial lower leg surgical wound dehiscence-resolved, reopens frequently            Left ischial pressure injury, stage III-first observed in clinic 5/1/2024          HISTORY: Patient with history of incomplete quadriplegia referred to [facility] for treatment of a stage IV pressure injury.  He has a history of previous pressure injuries to this area, and underwent muscle flaps in 2019, and then again in 2020.  He was seen in the wound clinic in November 2021 for an ulcer proximal from his current ulcer, and pressure injuries to his left posterior lower leg and left heel.  At that time, it was discovered that the patient had retained VAC foam embedded in the wound bed of the sacral wound.  Attempts were made to get him back to his plastic surgeon, though unsuccessful.  In January he underwent surgical removal of VAC sponge along with excisional debridement of his sacral wound by Dr. Chaves.  After the surgery, his wound went on to heal without incident.   In early April 2022, his home health nurse noted a new sacral ulcer, below the previous ulcer which quickly tripled in size over the following weeks.  The ulcer to his left medial lower leg had also deteriorated, with bone visible at the base..  He was hospitalized from 4/22 until 4/27/2022 and underwent surgery with Dr. Raman on 4/26 for  irrigation and debridement of multiple compartments of the left lower extremity, bone excision, and complex closure of chronic wound using biologic skin substitute.   His sacrococcygeal wound was not surgically addressed during this admission.  He was discharged back to his group home, with home health, and referral to outpatient wound clinic for his sacral wound.  He was instructed to follow-up with his surgeon for his lower leg wound.       Postoperatively, the left medial lower leg incision dehisced.  He was seen by his surgeon at [facility] on 5/11.  The surgeon opted to leave remaining sutures in place, and refer him to the wound clinic for treatment of this wound.   Treatment of this wound was initiated in clinic on 5/12.  During this visit was also noted that his heel DTI had resolved, but that he had a new pressure injury to his left posterior lower extremity.     A new pressure injury was noted to patient's right upper buttock/lower back on 5/20/2022.  Wound was linear in shape, skin discolored but intact.     Abrasion noted to left anterior lower leg.  First observed in clinic on 7/22/2022.  Patient states he bumped his leg into his food tray.     Small DTI noted to patient's left lateral lower leg on 7/29/2022.  Skin intact but discolored.     Large area of deep tissue injury noted to patient's left exterior lower leg.  Patient denied any trauma to this area.  Skin intact.  Wound documented.    1/27/2023: [place] [facility] [place] gross hematuria. He underwent RICHARD which showed watchman device was in place and he was taken off of Xarelto. While hospitalized wound team was consulted. He was referred back to [facility] and home health upon discharge.    Patient was hospitalized at [facility] for pyelonephritis from 2/26 until 3/2/2023, admitted for fever and general malaise.  He was admitted and initially started on linezolid and meropenem for suspected UTI and history of multidrug-resistant  organisms.  Urine cultures were negative. ID was consulted, recommended CT of chest and abdomen,which were negative for acute findings. However, he was treated with 5 days total course of antibiotics for suspected UTI, and symptoms completely resolved.  During this admission, the inpatient wound team was consulted for treatment of his sacral and lower leg wounds.  A wound culture was taken from his left heel pressure ulcer, negative.  Once stabilized, he was discharged home and referred back to [facility] to resume treatment of his wounds.    Patient was hospitalized at [facility] from 12/11 until 12/23/2023, admitted for fever.  Wound infection suspected.  CT scan of abdomen and pelvis for evaluation of sacral pressure injury showed gas tracking down to the bone consistent with osteomyelitis.  He underwent I&D of right ischial ulcer (documented as buttock) with Dr. Bansal, medial tract leading to an abscess was identified.  Cavity was opened allowing it to drain into the main wound bed.  Wound VAC was placed and managed by wound team during this admission.  A bone scan of patient's left foot was also done, initially concerning for osteomyelitis.  Orthopedic surgery was consulted and did not recommend surgical intervention. ID consulted also, recommended the patient to receive IV ertapenem 1 g every 24 hours plus IV daptomycin 8 mg/kg every 24 hours through 1/22/2024.   He was discharged to LTAC on 1/23 for IV antibiotics and wound care.  From the LTAC he was discharged home on 1/22 with home health and referral back to [facility] to resume management of his wounds  .      Pertinent Medical History: Incomplete quadriplegia, history of stage IV pressure injuries, history of flap procedures to sacral pressure injuries, osteomyelitis, obesity, colostomy in place   Contributing factors: Immobility and Obesity, impaired sensation    Personal support: Attendant-staff at skilled nursing and home health nursing    TOBACCO USE:   Former smoker, quit  in 1977.  Never used smokeless tobacco    Patient's problem list, allergies, and current medications reviewed and updated in Epic    Interval History:  Interval History thinned 7/29/2022.  Please see previous notes for complete interval history.   Interval History thinned 1/27/2023. Please see previous note for complete interval history.  Interval History thinned 3/3/2023.  Please see previous notes for complete interval history.    Interval History thinned 8/4/2023.  Please see previous notes for complete interval history.    Interval History thinned 1/31/2024.  Please see previous notes for complete interval history.      1/31/2024 Initial clinic visit with ADILSON Arthur, HOLDEN, CWDINESHN, CFCN.   Patient returns to clinic after hospitalization and LTAC stay.  VAC in place to right ischial wound.  Sacral wounds have progressed significantly since he was last seen in clinic.  Left medial wound has healed, though covered with friable tissue.  Left heel ulcer, previously resolved has reopened slightly.  He states that he is feeling well overall, denies fevers, chills, nausea, vomiting, cough or shortness of breath.     2/7/2024: Clinic visit with Steve Hodges MD. Patient reports feeling in normal state of health. Patient denies any alarms from wound VAC, however today he presented with significant odor from ischial wound and dressing was partially avulsed leaving in adequate suction. Machine was checked and functioning well, there were no recorded alarms.    2/16/24: Clinic visit with Dana DE ANDA, HOLDEN, CWON, CFCN.  Pt denies fevers, chills, nausea, vomiting.  Left shin fluctuance to wound with hematoma and dark sanguinous drainage.  Bone fragment removed during debridement.  Obtain x-ray as residual bone palpated.  [facility] ordered through [facility].  Coccyx wound has decreased from 3 ulcers to now 1.  Right ischial wound with slough, odor.  Dakin soaked performed during visit.  Wound  debrided.  Able to continue VAC postdebridement.     2/21/2024: Clinic visit with Steve Hodges MD. Patient reports doing ok, reports feeling well. Left medial lower extremity wound is measuring larger with thick slough and friable tissue. Xray completed today, will undoubtedly demonstrate known OM. Patient denies any issues with wound VAC. Home health has been soaking Ischial wound with Dakins prior to applying wound VAC.    2/28/2024: Clinic visit with Steve Hodges MD. Patient recently tested positive for COVID. Reports some congestion, cough, and brain fog. Denies other symptoms. Patient with new wounds to left lower extremity undoubtedly associated with known underlying OM. Patient's sacral and ischial wound appear to be improving, measuring smaller.    3/6/2024: Clinic visit with Steve Hodges MD. Patient reports feeling much better, COVID symptoms have resolved. Patient's left lower extremity wounds are stable, posterior leg wounds appear resolved. Patient sacral wound is stable and ischial wound is improving.    3/13/2024 : Clinic visit with ADILSON Arthur, FNP-BC, CWOCN, CFCN.   Patient states he is feeling well, offers no complaints.  Left lower extremity wounds continue to wax and wane.  Sacral and ischial wounds slowly progressing.    3/20/2024: Clinic visit with Steve Hodges MD. Patient reports doing ok. Denies any acute issues. Leg wounds continue to wax and wane. He reports there was more slough and increased odor from ischial wound so home health packed with dakins prior to applying VAC. No odor today in clinic.    3/27/2024: Clinic visit with Steve Hodges MD. Patient reports doing ok. Unfortunately home health did not pre-drape bridging the trac pad from wound VAC and right buttocks / hip skin is irritated and at risk of breaking down. Left leg wounds have improved. Sacral and ischial pressure injuries are stable.    4/3/2024: Clinic visit with Steve Hodges MD. Patient reports  doing well, denies any acute issues. Leg wounds are all improving, few new pinpoint areas of skin breakdown. Sacral and ischial pressure injuries slightly improved. Skin on right buttocks and hip has improved and did not fully breakdown.    4/10/2024 : Clinic visit with ADILSON Arthur, HOLDEN, IMAN, LILIYA.   Patient continues to feel well.  Today, all of his left lower extremity wounds are healed, though historically have tended to open and close.  Sacral and ischial wounds both measure bit larger today.    4/17/2024 : Clinic visit with ADILSON Arthur, HOLDEN, LILIYA VALERIO.   Anjum states he is feeling very well.  Left lower extremity wounds remain closed.  Sacral/coccyx ulcers and right ischial ulcer continue to wax and wane.      4/24/2024 : Clinic visit with ADILSON Arthur, HOLDEN, LILIYA VALERIO.   Anjum states he is feeling well.  He is left medial lower leg wound, and left heel wounds have reopened slightly, neither of which required excisional debridement today.  Sacral and ischial wounds are slowly progressing.   He has not yet obtained quarter strength Dakin's for soaks with VAC dressing change.  He decided to order from [facility].  However, I also sent an order to [facility] last week.    5/1/2024 : Clinic visit with ADILSON Arthur, HOLDEN, IMAN, LILIYA.   Anjum is not feeling well, very fatigued.  He is again COVID-positive, was in the ED 2 nights ago with fever and chills.  Chest x-ray showed no evidence of pneumonia.  A CT of his abdomen and pelvis was also done, showed apparent bone resorption of the ischial tuberosity, suggesting osteomyelitis-this is not a new finding.  Patient was treated for ischial OM in December 2023 and January of this year.  He was discharged home on cefdinir.   Today he is afebrile, but states he has been extremely fatigued.  He admits to sitting in his wheelchair more than usual.  He does have a new pressure injury to his left ischium, treatment initiated in  clinic today.    5/8/2024: Clinic visit with Steve Hodges MD. Patient reports feeling much better, reports has recovered from COVID and feeling in normal state of health. His sacral wound has resolved. Right ischial wound measuring smaller. Left ischial wound largely unchanged with thick adherent slough.      REVIEW OF SYSTEMS:   Unchanged from previous wound clinic assessment on 5/1/2024, except as noted in interval history above        PHYSICAL EXAMINATION:   /82   Pulse (!) 56   Temp 36.2 °C (97.1 °F) (Temporal)   Resp 18   SpO2 97%   Physical Exam  Constitutional:       Appearance: He is obese.   Cardiovascular:      Rate and Rhythm: Normal rate.   Pulmonary:      Effort: Pulmonary effort is normal.   Abdominal:      Comments: Colostomy left lower quadrant   Genitourinary:     Comments: Suprapubic catheter to down drain   Skin:     Comments: Stage IV coccygeal pressure injury -wounds tend to wax and wane. Wound has resolved today.    Stage IV pressure injury to right ischium- Wound making slow progress, slightly smaller.  Moderate serosanguineous drainage, no odor.  No evidence of infection    Full-thickness wound to left medial lower leg, surgical wound dehiscence-small open wound with poor tissue quality.  Area has not changed significantly over the past week.  Periwound with friable scar tissue    Stage III pressure injury left posterior heel -appears to have healed again, with fragile epithelium, no drainage.    Stage III pressure injury to posterior left lower leg-remains resolved with discoloration of intact skin    Stage III pressure injury of left ischium- Wound largely unchanged, wound bed with adherent slough, moderate serosanguineous drainage, no odor.  No evidence of infection     Neurological:      Mental Status: He is alert and oriented to person, place, and time.   Psychiatric:         Mood and Affect: Mood normal.         WOUND ASSESSMENT  Wound 12/12/23 Pressure Injury Ischium Right  (Active)   Wound Image    05/08/24 1500   Site Assessment Pink;Red 05/08/24 1500   Periwound Assessment Intact;Scar tissue 05/08/24 1500   Margins Unattached edges 05/08/24 1500   Drainage Amount Large 05/08/24 1500   Drainage Description Serosanguineous 05/08/24 1500   Treatments Cleansed;Provider debridement 05/08/24 1500   Offloading/DME Other (comment) 03/27/24 1625   Wound Cleansing Hypochlorus Acid 05/08/24 1500   Periwound Protectant Skin Protectant Wipes to Periwound;Drape 05/08/24 1500   Dressing Changed Changed 05/08/24 1500   Dressing Cleansing/Solutions Not Applicable 05/08/24 1500   Dressing Options Wound Vac;Offloading Dressing - Sacral;Other (Comments) 05/08/24 1500   Dressing Change/Treatment Frequency Monday, Wednesday, Friday, and As Needed 05/08/24 1500   Wound Team Following Weekly 05/08/24 1500   WOUND NURSE ONLY - Pressure Injury Stage 4 05/08/24 1500   Non-staged Wound Description Not applicable 05/08/24 1500   Wound Length (cm) 3.2 cm 05/08/24 1500   Wound Width (cm) 3.2 cm 05/08/24 1500   Wound Depth (cm) 1.9 cm 05/08/24 1500   Wound Surface Area (cm^2) 10.24 cm^2 05/08/24 1500   Wound Volume (cm^3) 19.456 cm^3 05/08/24 1500   Post-Procedure Length (cm) 3.4 cm 05/08/24 1500   Post-Procedure Width (cm) 3.5 cm 05/08/24 1500   Post-Procedure Depth (cm) 1.9 cm 05/08/24 1500   Post-Procedure Surface Area (cm^2) 11.9 cm^2 05/08/24 1500   Post-Procedure Volume (cm^3) 22.61 cm^3 05/08/24 1500   Wound Healing % 65 05/08/24 1500   Wound Bed Granulation (%) 100 % 03/06/24 1000   Tunneling (cm) 0 cm 05/08/24 1500   Tunneling Clock Position of Wound 2 03/06/24 1000   Undermining (cm) 2.5 cm 05/08/24 1500   Undermining of Wound, 1st Location From 12 o'clock;To 7 o'clock;To 12 o'clock 05/08/24 1500   Undermining (cm) - 2nd location 2.5 cm 02/16/24 1620   Undermining of Wound, 2nd Location From 7 o'clock;To 11 o'clock 02/16/24 1620   Wound Odor None 05/08/24 1500   Exposed Structures Fascia 05/08/24 1500    Number of days: 148       Wound 01/31/24 Pressure Injury Coccyx Inferior wound, previosly closed but reopened 1/31/24 (Active)   Wound Image   05/08/24 1500   Site Assessment Epithelialization 05/08/24 1500   Periwound Assessment Dry;Intact;Scar tissue 05/08/24 1500   Margins Attached edges 05/08/24 1500   Drainage Amount None 05/08/24 1500   Drainage Description Serosanguineous 05/01/24 1600   Treatments Cleansed 05/08/24 1500   Wound Cleansing Hypochlorus Acid 05/08/24 1500   Periwound Protectant Skin Protectant Wipes to Periwound;Barrier Paste 05/08/24 1500   Dressing Cleansing/Solutions Not Applicable 05/08/24 1500   Dressing Options Hydrofiber Silver;Offloading Dressing - Sacral;Other (Comments) 05/08/24 1500   Dressing Change/Treatment Frequency Monday, Wednesday, Friday, and As Needed 05/08/24 1500   Wound Team Following Weekly 03/20/24 1500   WOUND NURSE ONLY - Pressure Injury Stage 3 05/08/24 1500   Wound Length (cm) 0 cm 05/08/24 1500   Wound Width (cm) 0 cm 05/08/24 1500   Wound Depth (cm) 0 cm 05/08/24 1500   Wound Surface Area (cm^2) 0 cm^2 05/08/24 1500   Wound Volume (cm^3) 0 cm^3 05/08/24 1500   Post-Procedure Length (cm) 0.8 cm 04/24/24 1530   Post-Procedure Width (cm) 1 cm 04/24/24 1530   Post-Procedure Depth (cm) 0.4 cm 04/24/24 1530   Post-Procedure Surface Area (cm^2) 0.8 cm^2 04/24/24 1530   Post-Procedure Volume (cm^3) 0.32 cm^3 04/24/24 1530   Wound Healing % 100 05/08/24 1500   Wound Bed Slough (%) 30 % 03/06/24 1000   Tunneling (cm) 0 cm 05/08/24 1500   Undermining (cm) 0 cm 05/08/24 1500   Undermining of Wound, 1st Location From 11 o'clock;To 1 o'clock 03/13/24 1545   Wound Odor None 05/08/24 1500   Exposed Structures None 05/08/24 1500   Number of days: 98       Wound 02/16/24 Full Thickness Wound Leg Medial Left (Active)   Wound Image   05/08/24 1500   Site Assessment Red;Boggy 05/08/24 1500   Periwound Assessment Ecchymosis;Edema;Scar tissue 05/08/24 1500   Margins Attached edges  05/08/24 1500   Drainage Amount Small 05/08/24 1500   Drainage Description Serosanguineous 05/08/24 1500   Treatments Cleansed 05/08/24 1500   Wound Cleansing Hypochlorus Acid 05/08/24 1500   Periwound Protectant Skin Protectant Wipes to Periwound;Barrier Paste 05/08/24 1500   Dressing Cleansing/Solutions Not Applicable 05/08/24 1500   Dressing Options Hydrofera Blue Ready;Offloading Dressing - Sacral;Offloading Dressing - Heel;Other (Comments) 05/08/24 1500   Dressing Change/Treatment Frequency Monday, Wednesday, Friday, and As Needed 05/08/24 1500   Wound Team Following Weekly 05/08/24 1500   Non-staged Wound Description Full thickness 05/08/24 1500   Wound Length (cm) 0.6 cm 05/08/24 1500   Wound Width (cm) 0.6 cm 05/08/24 1500   Wound Depth (cm) 0 cm 05/08/24 1500   Wound Surface Area (cm^2) 0.36 cm^2 05/08/24 1500   Wound Volume (cm^3) 0 cm^3 05/08/24 1500   Post-Procedure Length (cm) 0.3 cm 04/24/24 1530   Post-Procedure Width (cm) 0.3 cm 04/24/24 1530   Post-Procedure Depth (cm) 0.1 cm 04/24/24 1530   Post-Procedure Surface Area (cm^2) 0.09 cm^2 04/24/24 1530   Post-Procedure Volume (cm^3) 0.009 cm^3 04/24/24 1530   Wound Healing % 100 05/08/24 1500   Tunneling (cm) 0 cm 05/08/24 1500   Undermining (cm) 0 cm 05/08/24 1500   Wound Odor None 05/08/24 1500   Exposed Structures None 05/08/24 1500   Number of days: 82       Wound 05/01/24 Pressure Injury Ischium Left (Active)   Wound Image    05/08/24 1500   Site Assessment Yellow;Red 05/08/24 1500   Periwound Assessment Intact;Scar tissue 05/08/24 1500   Margins Attached edges 05/08/24 1500   Closure Secondary intention 05/08/24 1500   Drainage Amount Small 05/08/24 1500   Drainage Description Serosanguineous 05/08/24 1500   Treatments Cleansed;Provider debridement 05/08/24 1500   Wound Cleansing Hypochlorus Acid 05/08/24 1500   Periwound Protectant Skin Protectant Wipes to Periwound;Barrier Paste 05/08/24 1500   Dressing Status Intact 05/01/24 1600   Dressing  Changed New 05/08/24 1500   Dressing Cleansing/Solutions Normal Saline 05/08/24 1500   Dressing Options Collagen Dressing;Hydrofiber Silver;Offloading Dressing - Sacral;Other (Comments) 05/08/24 1500   Dressing Change/Treatment Frequency Monday, Wednesday, Friday, and As Needed 05/08/24 1500   Wound Team Following Weekly 05/08/24 1500   WOUND NURSE ONLY - Pressure Injury Stage 2 05/08/24 1500   Non-staged Wound Description Full thickness 05/08/24 1500   Wound Length (cm) 1.4 cm 05/08/24 1500   Wound Width (cm) 1 cm 05/08/24 1500   Wound Depth (cm) 0.2 cm 05/01/24 1600   Wound Surface Area (cm^2) 1.4 cm^2 05/08/24 1500   Wound Volume (cm^3) 0.22 cm^3 05/01/24 1600   Post-Procedure Length (cm) 1.6 cm 05/08/24 1500   Post-Procedure Width (cm) 1.3 cm 05/08/24 1500   Post-Procedure Depth (cm) 0.3 cm 05/08/24 1500   Post-Procedure Surface Area (cm^2) 2.08 cm^2 05/08/24 1500   Post-Procedure Volume (cm^3) 0.624 cm^3 05/08/24 1500   Tunneling (cm) 0 cm 05/08/24 1500   Undermining (cm) 0 cm 05/08/24 1500   Wound Odor None 05/08/24 1500   Exposed Structures None 05/08/24 1500   Number of days: 7       PROCEDURE: Excisional debridement of right ischial ulcer   -Curette used to debride wound bed. Excisional debridement was performed to remove devitalized tissue until healthy, bleeding tissue was visualized.  Total wound area debrided approximately 11.9 cm².  Tissue debrided into the muscle / fascia layer  -Bleeding controlled with manual pressure.    -Wound care completed by wound RN, refer to flowsheet  -Patient tolerated the procedure well, without c/o pain or discomfort.      PROCEDURE: Excisional debridement of left ischial ulcer  -2% viscous lidocaine applied topically to wound bed for approximately 5 minutes prior to debridement  -Curette, scissors used to debride wound bed.  Excisional debridement was performed to remove devitalized tissue until healthy, bleeding tissue was visualized.   Entire surface of wound, 2.08 cm²  "debrided.  Tissue debrided into the subcutaneous layer.    -Bleeding controlled with manual pressure.    -Wound care completed by wound RN, refer to flowsheet  -Patient tolerated the procedure well, without c/o pain or discomfort.      Pertinent Labs and Diagnostics:    Labs:     A1c: No results found for: \"HBA1C\"     [facility] results, 7/1/2022 (under media tab)    CRP 13    ESR 31      IMAGING:     X-ray left tib-fib ordered 2/16/2024 through [facility]    12/11/2023-CT of abdomen pelvis with contrast  IMPRESSION:   1.  Right ischial decubitus ulcer extending to bone with soft tissue gas tracking along the right perineum. Appearance suggesting osteomyelitis, consider component of necrotizing fasciitis as clinically appropriate.  2.  Small pericardial effusion  3.  Left adrenal nodule, density on prior noncontrast CT demonstrates adenoma.  4.  Hepatomegaly  5.  Enlarged prostate, workup and evaluation for causes of prostate enlargement recommended as clinically appropriate.  6.  Atherosclerosis and atherosclerotic coronary artery disease    12/15/2023-bone scan of left foot  IMPRESSION:     1.  Mild increased activity in the LEFT 1st and 3rd toes on blood pool and delayed images possibly indicating inflammation/infection.  2.  No significant blood flow asymmetry.          VASCULAR STUDIES: No results found.    LAST  WOUND CULTURE:   Lab Results   Component Value Date/Time    CULTRSULT  04/29/2024 08:01 PM     3 or more organisms isolated, culture of doubtful  significance, please recollect.  Mixed enteric ade >100,000 cfu/mL        PATHOLOGY  2/17/2023-bone fragment extracted from left lower extremity wound\\  FINAL DIAGNOSIS:     A. Left leg bone fragment at base of chronic wound:          Extensively degenerated fibrocartilaginous tissue with a rim of           fibrinopurulent debris          Correlate with culture findings            Comment: While no residual intact bone is identified, these           " findings are suggestive of adjacent osteomyelitis.      ASSESSMENT AND PLAN:     1. Sacral decubitus ulcer, stage IV ([facility])  Comments: Ulcer first noted in early April 2022 as small open area which quickly enlarged.  This ulcer is present distal from previous sacral ulcer which healed after surgery in January.  Patient has history of flap reconstruction x2 to this area.    5/8/2024: Wound appears resolved today.  -No excisional debridement required today  -Patient to return to clinic weekly for assessment and debridement  -Patient does spend a lot of time up in his wheelchair, and wishes to continue to do so for his quality of life.  He lives independently, drives, and is involved with family activities.  He does have an appropriate pressure-relief cushion and is very well versed on pressure relieving techniques.  - Known OM that was previously treated.  CT scan done during recent hospitalization did not show OM of sacrum, however OM of the ischium noted.    Wound care: Silicone foam pressure reliving dressing    2.  Pressure injury of right ischium, stage IV  Comments: Abscess and OM found on CT during hospitalization in December 2023.  Patient underwent I&D with VAC placement.  IV antibiotics through 1/22/2024.    5/8/2024: No significant change in wound area or depth.  Patient was in the ED on 4/29 with fever and chills.  Among other diagnostics, CT of abdomen and pelvis was repeated, showing OM of ischium.  He has already been treated with IV antibiotics for previous finding of the same.  - Excisional debridement of nonviable tissue from wound in clinic today, medically necessary to promote wound healing including epibole.  -Home health has been instructed to continue Dakins soaked gauze to wound for 10 to 15 minutes prior to placement of new VAC dressing as needed for odor / slough.  -Continue wound VAC to ischial wound  -Patient to return to clinic weekly for assessment and debridement  -Patient admits that he  has been up in his wheelchair for longer than usual, due to increased fatigue, possibly related to COVID  -Home health to change dressing 2 times per week in between clinic visits   -Patient is very well versed on pressure relief measures, has adequate surfaces in wheelchair and on his bed.    Wound care:  NPWT at -125 mmHg to accelerate granulation, change 3 times per week, sacral offloading foam.    3. Postoperative wound dehiscence, subsequent encounter  4. Osteomyelitis of left lower extremity  Comments: On 4/26/2022 patient underwent irrigation and debridement of multiple compartments of the left lower extremity states for pressure injury, with bone excision, and complex closure of chronic wound using biologic skin substitute.  During postop visit in surgeons office on 5/11, surgical site was noted to be dehisced.      5/8/2024: Small open area to left medial lower leg wound, tissue friable.  This wound is waxed and waned, known underlying osteomyelitis.  -No excisional debridement of these wounds required today  -Patient to be mindful of offloading when supine, should assure that his leg is not rotating medially    Wound care: Silicone foam dressing, Tubigrip D    5. Deep tissue injury  6. Pressure injury of left foot, stage IV  Comments: DTI to posterior left lower leg first observed in clinic 7/29/2022.  Patient does not know how it started, had been wearing heel float boot consistently.  Evolved into stage IV pressure injury    5/8/2024: Both wounds remain healed.    -High risk for recurrence, has opened and closed several times before  -Monitor site each clinic visit     Care: Open to air    7. Pressure injury of left heel, stage 3 ([facility])  Comments: First noted in clinic on 10/21/2022, originally noted to be stage II    5/8/2024: Wound appears to be healed again, covered with fragile epithelium  -Resume topical therapy  -Patient to return to clinic weekly for assessment   -Prevalon boot to alleviate  pressure      Wound care: Silicone heel offloading dressing    8.  Pressure injury of left leg, stage III    5/8/2024: Remains healed, covered with thin, fragile epithelium with discoloration.  Skin appears is intact with no drainage.  -Wound is closed, however historically has closed and recurred multiple times  -Monitor each clinic visit       Wound Care: Open to air, Tubigrip to manage edema    9.  Pressure injury of left ischium, stage III    5/8/2024: Wound has not improved.  -Excisional debridement of wound in clinic today, medically necessary to promote wound healing.  -Patient to return to clinic weekly for assessment and debridement  -Home health to change dressing 1-2 times per week in between clinic visits     Wound care: Collagen, hydrofiber silver, offloading sacral dressing    10. Quadriplegia, C5-C7, incomplete (HCC)  Comments: Complicating factor.  Impaired mobility and sensation  -Patient is still spending 7-8 hours/day up in his wheelchair, though he does have appropriate offloading cushion, and knows to reposition frequently.  Wears heel float boots bilaterally at all times  Wound care: Silver Hydrofiber to manage exudate and bioburden, foam cover dressing, Hypafix tape     Please note that this note may have been created using voice recognition software. I have worked with technical experts from [employer] to optimize the interface.  I have made every reasonable attempt to correct obvious errors, but there may be errors of grammar and possibly content that I did not discover before finalizing the note.

## 2024-05-08 NOTE — PATIENT INSTRUCTIONS
-Keep your wound dressing clean, dry, and intact.     -Change your dressing if it becomes soiled, soaked, or falls off.    -Orders have been sent to [facility].    -Wound vac may not have any drainage in tube or cannister & it will still be working.   Change cannister if it does become full by pressing tab on side of machine to remove canister and snap on new one. The full canister can be thrown in the trash. If the cannister fills with bright red blood - go to ER. Dressing will be changed every MWF at the wound clinic, or in combination with home health if applicable.  If you are having issues with your wound VAC, please consider patching leaks, changing the canister, or calling [phone] for troubleshooting. If the wound VAC has been off or un-operational for over 2 hours, notify the wound clinic (or home health if you have it) and remove all dressings including black foam and replace with normal saline damp gauze.     -Change colostomies every 5-7 days. Change appliance immediately if it is leaking or peristomal skin feels irritated, has itching, or  burning.   To change the appliance, remove previous appliance, cleanse peristomal skin with warm water/washcloth, pat dry, make an ostomy template or use cardboard measuring guide and trace ostomy shape onto back of barrier, cut out barrier, apply a paste ring around barrier opening and apply appliance. Empty pouches when no more than ½ full. Check contents every 2 hours or as needed. Do not leave soap residue on tissue and do not use baby wipes or skin prep wipes.     -Should you experience any significant changes in your wound(s), such as infection (redness, swelling, localized heat, increased pain, fever > 101 F, chills) or have any questions regarding your home care instructions, please contact the wound center at [phone]. If after hours, contact your primary care physician or go to the hospital emergency room.

## 2024-05-08 NOTE — WOUND TEAM
HH order entered into [facility] and routed to [facility] electronically.

## 2024-05-10 ENCOUNTER — TELEPHONE (OUTPATIENT)
Dept: WOUND CARE | Facility: MEDICAL CENTER | Age: 74
End: 2024-05-10
Payer: MEDICARE

## 2024-05-11 NOTE — TELEPHONE ENCOUNTER
"Receieved phone call from patient on 5/9/24 stating that he received a letter that was sent to [place] from Medicare stating that his \"Appeal is denied.\" He has called [facility] and has had a difficult time reaching anyone. This RN reached out to Leslie /[facility] representative to ask if she has any experience with these letters, or if she can send the patent in the right direction or who to contact. Patient stated that he is concerned that Medicare will stop paying for the wound vac and he will get stuck with a bill. Leslie stated that she would look into this and the patient was notified. This RN asked patient to bring a copy of the letter with him to his next appointment.  "

## 2024-05-13 ENCOUNTER — TELEPHONE (OUTPATIENT)
Dept: WOUND CARE | Facility: MEDICAL CENTER | Age: 74
End: 2024-05-13
Payer: MEDICARE

## 2024-05-13 NOTE — TELEPHONE ENCOUNTER
April from [facility] called to report that patient's left ischium wound is worsening. More eschar and slough present, they have categorized as DTI.     Patient is scheduled to be seen in clinic on Wednesday, 5/15.     Report provided to Dr Hodges.

## 2024-05-15 ENCOUNTER — OFFICE VISIT (OUTPATIENT)
Dept: WOUND CARE | Facility: MEDICAL CENTER | Age: 74
End: 2024-05-15
Attending: INTERNAL MEDICINE
Payer: MEDICARE

## 2024-05-15 ENCOUNTER — TELEPHONE (OUTPATIENT)
Dept: WOUND CARE | Facility: MEDICAL CENTER | Age: 74
End: 2024-05-15

## 2024-05-15 VITALS
DIASTOLIC BLOOD PRESSURE: 80 MMHG | OXYGEN SATURATION: 95 % | TEMPERATURE: 98.4 F | HEART RATE: 61 BPM | RESPIRATION RATE: 18 BRPM | SYSTOLIC BLOOD PRESSURE: 119 MMHG

## 2024-05-15 DIAGNOSIS — M86.9 OSTEOMYELITIS OF LEFT LOWER EXTREMITY (HCC): ICD-10-CM

## 2024-05-15 DIAGNOSIS — L89.154 SACRAL DECUBITUS ULCER, STAGE IV (HCC): ICD-10-CM

## 2024-05-15 DIAGNOSIS — L89.314 PRESSURE INJURY OF RIGHT ISCHIUM, STAGE 4 (HCC): ICD-10-CM

## 2024-05-15 DIAGNOSIS — L89.893 PRESSURE INJURY OF LEFT LEG, STAGE 3 (HCC): ICD-10-CM

## 2024-05-15 DIAGNOSIS — L89.894 PRESSURE INJURY OF LEFT FOOT, STAGE 4 (HCC): ICD-10-CM

## 2024-05-15 DIAGNOSIS — G82.54 QUADRIPLEGIA, C5-C7 INCOMPLETE (HCC): ICD-10-CM

## 2024-05-15 DIAGNOSIS — T81.31XD POSTOPERATIVE WOUND DEHISCENCE, SUBSEQUENT ENCOUNTER: ICD-10-CM

## 2024-05-15 DIAGNOSIS — L89.623 PRESSURE INJURY OF LEFT HEEL, STAGE 3 (HCC): ICD-10-CM

## 2024-05-15 DIAGNOSIS — L89.323 PRESSURE INJURY OF LEFT ISCHIUM, STAGE 3 (HCC): ICD-10-CM

## 2024-05-15 DIAGNOSIS — T14.8XXA DEEP TISSUE INJURY: ICD-10-CM

## 2024-05-15 PROCEDURE — 11042 DBRDMT SUBQ TIS 1ST 20SQCM/<: CPT | Mod: 59 | Performed by: STUDENT IN AN ORGANIZED HEALTH CARE EDUCATION/TRAINING PROGRAM

## 2024-05-15 PROCEDURE — 3074F SYST BP LT 130 MM HG: CPT | Performed by: STUDENT IN AN ORGANIZED HEALTH CARE EDUCATION/TRAINING PROGRAM

## 2024-05-15 PROCEDURE — 11043 DBRDMT MUSC&/FSCA 1ST 20/<: CPT | Performed by: STUDENT IN AN ORGANIZED HEALTH CARE EDUCATION/TRAINING PROGRAM

## 2024-05-15 PROCEDURE — 99213 OFFICE O/P EST LOW 20 MIN: CPT | Mod: 25 | Performed by: STUDENT IN AN ORGANIZED HEALTH CARE EDUCATION/TRAINING PROGRAM

## 2024-05-15 PROCEDURE — 3079F DIAST BP 80-89 MM HG: CPT | Performed by: STUDENT IN AN ORGANIZED HEALTH CARE EDUCATION/TRAINING PROGRAM

## 2024-05-15 NOTE — TELEPHONE ENCOUNTER
Phone call to [employer] to discuss the copy of the letter that the patient received from Medicare (scanned into [employer]). [employer] stated that they sent a Rx Renewal for the wound vac to a provider outside of the wound clinic. This RN provided the appropriate fax number for our clinic and the Rx was signed and faxed back to [employer] and scanned into [employer].

## 2024-05-15 NOTE — PROGRESS NOTES
Provider Encounter- Pressure Injury        HISTORY OF PRESENT ILLNESS  Wound History:    START OF CARE IN CLINIC: 1/31/2024 (return to clinic after hospitalization)    REFERRING PROVIDER: Racquel York       WOUNDS-superior coccyx pressure injury, stage IV                                 Coccyx pressure injury, stage IV           Inferior coccyx pressure injury, stage IV                                 Right ischial pressure injury, stage IV                                 Left heel pressure injury, recurring stage III                                 Left posterior lower leg/calf-stage III                                 Left medial lower leg surgical wound dehiscence-resolved, reopens frequently            Left ischial pressure injury, stage III-first observed in clinic 5/1/2024          HISTORY: Patient with history of incomplete quadriplegia referred to [facility] for treatment of a stage IV pressure injury.  He has a history of previous pressure injuries to this area, and underwent muscle flaps in 2019, and then again in 2020.  He was seen in the wound clinic in November 2021 for an ulcer proximal from his current ulcer, and pressure injuries to his left posterior lower leg and left heel.  At that time, it was discovered that the patient had retained VAC foam embedded in the wound bed of the sacral wound.  Attempts were made to get him back to his plastic surgeon, though unsuccessful.  In January he underwent surgical removal of VAC sponge along with excisional debridement of his sacral wound by Dr. Chaves.  After the surgery, his wound went on to heal without incident.   In early April 2022, his home health nurse noted a new sacral ulcer, below the previous ulcer which quickly tripled in size over the following weeks.  The ulcer to his left medial lower leg had also deteriorated, with bone visible at the base..  He was hospitalized from 4/22 until 4/27/2022 and underwent surgery with Dr. Raman on 4/26 for  irrigation and debridement of multiple compartments of the left lower extremity, bone excision, and complex closure of chronic wound using biologic skin substitute.   His sacrococcygeal wound was not surgically addressed during this admission.  He was discharged back to his group home, with home health, and referral to outpatient wound clinic for his sacral wound.  He was instructed to follow-up with his surgeon for his lower leg wound.       Postoperatively, the left medial lower leg incision dehisced.  He was seen by his surgeon at [facility] on 5/11.  The surgeon opted to leave remaining sutures in place, and refer him to the wound clinic for treatment of this wound.   Treatment of this wound was initiated in clinic on 5/12.  During this visit was also noted that his heel DTI had resolved, but that he had a new pressure injury to his left posterior lower extremity.     A new pressure injury was noted to patient's right upper buttock/lower back on 5/20/2022.  Wound was linear in shape, skin discolored but intact.     Abrasion noted to left anterior lower leg.  First observed in clinic on 7/22/2022.  Patient states he bumped his leg into his food tray.     Small DTI noted to patient's left lateral lower leg on 7/29/2022.  Skin intact but discolored.     Large area of deep tissue injury noted to patient's left exterior lower leg.  Patient denied any trauma to this area.  Skin intact.  Wound documented.    1/27/2023: Patient was admitted to [facility] from 1/23-1/25/2023 with gross hematuria. He underwent RICHARD which showed watchman device was in place and he was taken off of Xarelto. While hospitalized wound team was consulted. He was referred back to [facility] and home health upon discharge.    Patient was hospitalized at [facility] for pyelonephritis from 2/26 until 3/2/2023, admitted for fever and general malaise.  He was admitted and initially started on linezolid and meropenem for suspected UTI and history of multidrug-resistant  organisms.  Urine cultures were negative. ID was consulted, recommended CT of chest and abdomen,which were negative for acute findings. However, he was treated with 5 days total course of antibiotics for suspected UTI, and symptoms completely resolved.  During this admission, the inpatient wound team was consulted for treatment of his sacral and lower leg wounds.  A wound culture was taken from his left heel pressure ulcer, negative.  Once stabilized, he was discharged home and referred back to [facility] to resume treatment of his wounds.    Patient was hospitalized at [facility] from 12/11 until 12/23/2023, admitted for fever.  Wound infection suspected.  CT scan of abdomen and pelvis for evaluation of sacral pressure injury showed gas tracking down to the bone consistent with osteomyelitis.  He underwent I&D of right ischial ulcer (documented as buttock) with Dr. Bansal, medial tract leading to an abscess was identified.  Cavity was opened allowing it to drain into the main wound bed.  Wound VAC was placed and managed by wound team during this admission.  A bone scan of patient's left foot was also done, initially concerning for osteomyelitis.  Orthopedic surgery was consulted and did not recommend surgical intervention. ID consulted also, recommended the patient to receive IV ertapenem 1 g every 24 hours plus IV daptomycin 8 mg/kg every 24 hours through 1/22/2024.   He was discharged to LTAC on 1/23 for IV antibiotics and wound care.  From the LTAC he was discharged home on 1/22 with home health and referral back to [facility] to resume management of his wounds  .      Pertinent Medical History: Incomplete quadriplegia, history of stage IV pressure injuries, history of flap procedures to sacral pressure injuries, osteomyelitis, obesity, colostomy in place   Contributing factors: Immobility and Obesity, impaired sensation    Personal support: Attendant-staff at assisted and home health nursing    TOBACCO USE:   Former smoker, quit  in 1977.  Never used smokeless tobacco    Patient's problem list, allergies, and current medications reviewed and updated in Epic    Interval History:  Interval History thinned 7/29/2022.  Please see previous notes for complete interval history.   Interval History thinned 1/27/2023. Please see previous note for complete interval history.  Interval History thinned 3/3/2023.  Please see previous notes for complete interval history.    Interval History thinned 8/4/2023.  Please see previous notes for complete interval history.    Interval History thinned 1/31/2024.  Please see previous notes for complete interval history.      1/31/2024 Initial clinic visit with ADILSON Arthur, HOLDEN, CWDINESHN, CFCN.   Patient returns to clinic after hospitalization and LTAC stay.  VAC in place to right ischial wound.  Sacral wounds have progressed significantly since he was last seen in clinic.  Left medial wound has healed, though covered with friable tissue.  Left heel ulcer, previously resolved has reopened slightly.  He states that he is feeling well overall, denies fevers, chills, nausea, vomiting, cough or shortness of breath.     2/7/2024: Clinic visit with Steve Hodges MD. Patient reports feeling in normal state of health. Patient denies any alarms from wound VAC, however today he presented with significant odor from ischial wound and dressing was partially avulsed leaving in adequate suction. Machine was checked and functioning well, there were no recorded alarms.    2/16/24: Clinic visit with Dana DE ANDA, HOLDEN, CWON, CFCN.  Pt denies fevers, chills, nausea, vomiting.  Left shin fluctuance to wound with hematoma and dark sanguinous drainage.  Bone fragment removed during debridement.  Obtain x-ray as residual bone palpated.  [facility] through [facility].  Coccyx wound has decreased from 3 ulcers to now 1.  Right ischial wound with slough, odor.  Dakin soaked performed during visit.  Wound  debrided.  Able to continue VAC postdebridement.     2/21/2024: Clinic visit with Steve Hodges MD. Patient reports doing ok, reports feeling well. Left medial lower extremity wound is measuring larger with thick slough and friable tissue. Xray completed today, will undoubtedly demonstrate known OM. Patient denies any issues with wound VAC. Home health has been soaking Ischial wound with Dakins prior to applying wound VAC.    2/28/2024: Clinic visit with Steve Hodges MD. Patient recently tested positive for COVID. Reports some congestion, cough, and brain fog. Denies other symptoms. Patient with new wounds to left lower extremity undoubtedly associated with known underlying OM. Patient's sacral and ischial wound appear to be improving, measuring smaller.    3/6/2024: Clinic visit with Steve Hodges MD. Patient reports feeling much better, COVID symptoms have resolved. Patient's left lower extremity wounds are stable, posterior leg wounds appear resolved. Patient sacral wound is stable and ischial wound is improving.    3/13/2024 : Clinic visit with ADILSON Arthur, FNP-BC, CWOCN, CFCN.   Patient states he is feeling well, offers no complaints.  Left lower extremity wounds continue to wax and wane.  Sacral and ischial wounds slowly progressing.    3/20/2024: Clinic visit with Steve Hodges MD. Patient reports doing ok. Denies any acute issues. Leg wounds continue to wax and wane. He reports there was more slough and increased odor from ischial wound so home health packed with dakins prior to applying VAC. No odor today in clinic.    3/27/2024: Clinic visit with Steve Hodges MD. Patient reports doing ok. Unfortunately home health did not pre-drape bridging the trac pad from wound VAC and right buttocks / hip skin is irritated and at risk of breaking down. Left leg wounds have improved. Sacral and ischial pressure injuries are stable.    4/3/2024: Clinic visit with Steve Hodges MD. Patient reports  doing well, denies any acute issues. Leg wounds are all improving, few new pinpoint areas of skin breakdown. Sacral and ischial pressure injuries slightly improved. Skin on right buttocks and hip has improved and did not fully breakdown.    4/10/2024 : Clinic visit with ADILSON Arthur, HOLDEN, IMAN, LILIYA.   Patient continues to feel well.  Today, all of his left lower extremity wounds are healed, though historically have tended to open and close.  Sacral and ischial wounds both measure bit larger today.    4/17/2024 : Clinic visit with ADILSON Arthur, HOLDEN, LILIYA VALERIO.   Anjum states he is feeling very well.  Left lower extremity wounds remain closed.  Sacral/coccyx ulcers and right ischial ulcer continue to wax and wane.      4/24/2024 : Clinic visit with ADILSON Arthur, HOLDEN, LILIYA VALERIO.   Anjum states he is feeling well.  He is left medial lower leg wound, and left heel wounds have reopened slightly, neither of which required excisional debridement today.  Sacral and ischial wounds are slowly progressing.   He has not yet obtained quarter strength Dakin's for soaks with VAC dressing change.  He decided to order from [employer].  However, I also sent an order to [facility] last week.    5/1/2024 : Clinic visit with ADILSON Arthur, HOLDEN, IMAN, LILIYA.   Anjum is not feeling well, very fatigued.  He is again COVID-positive, was in the ED 2 nights ago with fever and chills.  Chest x-ray showed no evidence of pneumonia.  A CT of his abdomen and pelvis was also done, showed apparent bone resorption of the ischial tuberosity, suggesting osteomyelitis-this is not a new finding.  Patient was treated for ischial OM in December 2023 and January of this year.  He was discharged home on cefdinir.   Today he is afebrile, but states he has been extremely fatigued.  He admits to sitting in his wheelchair more than usual.  He does have a new pressure injury to his left ischium, treatment initiated in  clinic today.    5/8/2024: Clinic visit with Steve Hodges MD. Patient reports feeling much better, reports has recovered from COVID and feeling in normal state of health. His sacral wound has resolved. Right ischial wound measuring smaller. Left ischial wound largely unchanged with thick adherent slough.    5/15/2024: Clinic visit with Steve Hodges MD. Patient reports feeling in normal state of health. He received a letter from medicare denying appeal to continue wound VAC. Clinic staff looking into this as he has been making progress with VAC use, appears that order to continue NPWT was sent to different facility. I have signed order to continue NPWT. Patient continues to develop new pressure injuries and worsening of left ischial wound. He has a [employer] wheelchair cushion, but with new wounds this may be inadequate. Will order new seating eval from [email or web address].      REVIEW OF SYSTEMS:   Unchanged from previous wound clinic assessment on 5/8/2024, except as noted in interval history above        PHYSICAL EXAMINATION:   /80   Pulse 61   Temp 36.9 °C (98.4 °F) (Temporal)   Resp 18   SpO2 95%   Physical Exam  Constitutional:       Appearance: He is obese.   Cardiovascular:      Rate and Rhythm: Normal rate.   Pulmonary:      Effort: Pulmonary effort is normal.   Abdominal:      Comments: Colostomy left lower quadrant   Genitourinary:     Comments: Suprapubic catheter to down drain   Skin:     Comments: Stage IV coccygeal pressure injury -wounds tend to wax and wane. Wound remains resolved.    Stage IV pressure injury to right ischium- Wound making slow progress, slightly smaller though undermining slightly larger. Moderate serosanguineous drainage, no odor.  No evidence of infection    Full-thickness wound to left medial lower leg, surgical wound dehiscence-small open wound with poor tissue quality.  Area has not changed significantly. Periwound with friable scar tissue    Stage III pressure injury left  posterior heel -appears to have healed again, with fragile epithelium, no drainage.    Stage III pressure injury to posterior left lower leg-remains resolved with discoloration of intact skin    Stage III pressure injury of left ischium- Wound measuring larger, wound bed with necrotic adherent slough, moderate serosanguineous drainage, no odor.  No evidence of infection     Neurological:      Mental Status: He is alert and oriented to person, place, and time.   Psychiatric:         Mood and Affect: Mood normal.         WOUND ASSESSMENT  Wound 12/12/23 Pressure Injury Ischium Right (Active)   Wound Image    05/15/24 1500   Site Assessment Red;[place] 05/15/24 1500   Periwound Assessment Scar tissue 05/15/24 1500   Margins Unattached edges 05/15/24 1500   Drainage Amount Moderate 05/15/24 1500   Drainage Description Serosanguineous 05/15/24 1500   Treatments Cleansed;Provider debridement 05/15/24 1500   Offloading/DME Other (comment) 03/27/24 1625   Wound Cleansing Hypochlorus Acid 05/15/24 1500   Periwound Protectant Skin Protectant Wipes to Periwound;Drape 05/15/24 1500   Dressing Changed Changed 05/15/24 1500   Dressing Cleansing/Solutions Not Applicable 05/15/24 1500   Dressing Options Wound Vac;Offloading Dressing - Sacral 05/15/24 1500   Dressing Change/Treatment Frequency Monday, Wednesday, Friday, and As Needed 05/15/24 1500   Wound Team Following Weekly 05/15/24 1500   WOUND NURSE ONLY - Pressure Injury Stage 4 05/15/24 1500   Non-staged Wound Description Not applicable 05/08/24 1500   Wound Length (cm) 2.6 cm 05/15/24 1500   Wound Width (cm) 3.3 cm 05/15/24 1500   Wound Depth (cm) 2.5 cm 05/15/24 1500   Wound Surface Area (cm^2) 8.58 cm^2 05/15/24 1500   Wound Volume (cm^3) 21.45 cm^3 05/15/24 1500   Post-Procedure Length (cm) 2.6 cm 05/15/24 1500   Post-Procedure Width (cm) 3.5 cm 05/15/24 1500   Post-Procedure Depth (cm) 2.5 cm 05/15/24 1500   Post-Procedure Surface Area (cm^2) 9.1 cm^2 05/15/24 1500    Post-Procedure Volume (cm^3) 22.75 cm^3 05/15/24 1500   Wound Healing % 62 05/15/24 1500   Wound Bed Granulation (%) 100 % 03/06/24 1000   Tunneling (cm) 0 cm 05/15/24 1500   Tunneling Clock Position of Wound 2 03/06/24 1000   Undermining (cm) 4 cm 05/15/24 1500   Undermining of Wound, 1st Location From 7 o'clock;To 2 o'clock 05/15/24 1500   Undermining (cm) - 2nd location 2.5 cm 02/16/24 1620   Undermining of Wound, 2nd Location From 7 o'clock;To 11 o'clock 02/16/24 1620   Wound Odor Mild 05/15/24 1500   Exposed Structures Fascia 05/15/24 1500   Number of days: 156       Wound 01/31/24 Pressure Injury Coccyx Inferior wound, previosly closed but reopened 1/31/24 (Active)   Wound Image   05/15/24 1500   Site Assessment Epithelialization 05/15/24 1500   Periwound Assessment Scar tissue 05/15/24 1500   Margins Attached edges 05/08/24 1500   Drainage Amount None 05/15/24 1500   Drainage Description Serosanguineous 05/01/24 1600   Treatments Cleansed 05/15/24 1500   Wound Cleansing Hypochlorus Acid 05/15/24 1500   Periwound Protectant Barrier Paste 05/15/24 1500   Dressing Cleansing/Solutions Not Applicable 05/15/24 1500   Dressing Options Hydrofiber Silver;Offloading Dressing - Sacral 05/15/24 1500   Dressing Change/Treatment Frequency Monday, Wednesday, Friday, and As Needed 05/08/24 1500   Wound Team Following Weekly 05/15/24 1500   WOUND NURSE ONLY - Pressure Injury Stage 3 05/08/24 1500   Wound Length (cm) 0 cm 05/15/24 1500   Wound Width (cm) 0 cm 05/15/24 1500   Wound Depth (cm) 0 cm 05/15/24 1500   Wound Surface Area (cm^2) 0 cm^2 05/15/24 1500   Wound Volume (cm^3) 0 cm^3 05/15/24 1500   Post-Procedure Length (cm) 0.8 cm 04/24/24 1530   Post-Procedure Width (cm) 1 cm 04/24/24 1530   Post-Procedure Depth (cm) 0.4 cm 04/24/24 1530   Post-Procedure Surface Area (cm^2) 0.8 cm^2 04/24/24 1530   Post-Procedure Volume (cm^3) 0.32 cm^3 04/24/24 1530   Wound Healing % 100 05/15/24 1500   Wound Bed Slough (%) 30 %  03/06/24 1000   Tunneling (cm) 0 cm 05/15/24 1500   Undermining (cm) 0 cm 05/15/24 1500   Undermining of Wound, 1st Location From 11 o'clock;To 1 o'clock 03/13/24 1545   Wound Odor None 05/15/24 1500   Exposed Structures None 05/15/24 1500   Number of days: 106       Wound 02/16/24 Full Thickness Wound Leg Medial Left (Active)   Wound Image   05/15/24 1500   Site Assessment Red;Yellow 05/15/24 1500   Periwound Assessment Ecchymosis 05/15/24 1500   Margins Attached edges 05/15/24 1500   Drainage Amount Scant 05/15/24 1500   Drainage Description Serosanguineous 05/15/24 1500   Treatments Cleansed 05/15/24 1500   Wound Cleansing Hypochlorus Acid 05/15/24 1500   Periwound Protectant Skin Protectant Wipes to Periwound 05/15/24 1500   Dressing Cleansing/Solutions Not Applicable 05/15/24 1500   Dressing Options [place];Offloading Dressing - Sacral;Tubigrip 05/15/24 1500   Dressing Change/Treatment Frequency Monday, Wednesday, Friday, and As Needed 05/15/24 1500   Wound Team Following Weekly 05/15/24 1500   Non-staged Wound Description Full thickness 05/15/24 1500   Wound Length (cm) 2.3 cm 05/15/24 1500   Wound Width (cm) 3.3 cm 05/15/24 1500   Wound Depth (cm) 2.5 cm 05/15/24 1500   Wound Surface Area (cm^2) 7.59 cm^2 05/15/24 1500   Wound Volume (cm^3) 18.975 cm^3 05/15/24 1500   Post-Procedure Length (cm) 0.3 cm 04/24/24 1530   Post-Procedure Width (cm) 0.3 cm 04/24/24 1530   Post-Procedure Depth (cm) 0.1 cm 04/24/24 1530   Post-Procedure Surface Area (cm^2) 0.09 cm^2 04/24/24 1530   Post-Procedure Volume (cm^3) 0.009 cm^3 04/24/24 1530   Wound Healing % -691 05/15/24 1500   Tunneling (cm) 0 cm 05/15/24 1500   Undermining (cm) 0 cm 05/15/24 1500   Wound Odor None 05/15/24 1500   Exposed Structures None 05/15/24 1500   Number of days: 90       Wound 05/01/24 Pressure Injury Ischium Left (Active)   Wound Image    05/15/24 1500   Site Assessment Yellow;Red;Purple 05/15/24 1500   Periwound Assessment Scar tissue  05/15/24 1500   Margins Attached edges 05/15/24 1500   Closure Secondary intention 05/08/24 1500   Drainage Amount Moderate 05/15/24 1500   Drainage Description Serosanguineous 05/15/24 1500   Treatments Cleansed;Provider debridement 05/15/24 1500   Wound Cleansing Hypochlorus Acid 05/15/24 1500   Periwound Protectant Barrier Paste 05/15/24 1500   Dressing Status Intact 05/01/24 1600   Dressing Changed New 05/15/24 1500   Dressing Cleansing/Solutions Not Applicable 05/15/24 1500   Dressing Options Honey Gel;Hydrofiber Silver;Offloading Dressing - Sacral 05/15/24 1500   Dressing Change/Treatment Frequency Monday, Wednesday, Friday, and As Needed 05/15/24 1500   Wound Team Following Weekly 05/15/24 1500   WOUND NURSE ONLY - Pressure Injury Stage 2 05/08/24 1500   Non-staged Wound Description Full thickness 05/15/24 1500   Wound Length (cm) 2.3 cm 05/15/24 1500   Wound Width (cm) 1.4 cm 05/15/24 1500   Wound Depth (cm) 0.2 cm 05/15/24 1500   Wound Surface Area (cm^2) 3.22 cm^2 05/15/24 1500   Wound Volume (cm^3) 0.644 cm^3 05/15/24 1500   Post-Procedure Length (cm) 2.3 cm 05/15/24 1500   Post-Procedure Width (cm) 1.4 cm 05/15/24 1500   Post-Procedure Depth (cm) 0.8 cm 05/15/24 1500   Post-Procedure Surface Area (cm^2) 3.22 cm^2 05/15/24 1500   Post-Procedure Volume (cm^3) 2.576 cm^3 05/15/24 1500   Wound Healing % -193 05/15/24 1500   Tunneling (cm) 0 cm 05/15/24 1500   Undermining (cm) 0 cm 05/15/24 1500   Wound Odor None 05/15/24 1500   Exposed Structures None 05/15/24 1500   Number of days: 15       PROCEDURE: Excisional debridement of right ischial ulcer   -Curette used to debride wound bed. Excisional debridement was performed to remove devitalized tissue until healthy, bleeding tissue was visualized.  Total wound area debrided approximately 9.1 cm².  Tissue debrided into the muscle / fascia layer  -Bleeding controlled with manual pressure.    -Wound care completed by wound RN, refer to flowsheet  -Patient  "tolerated the procedure well, without c/o pain or discomfort.      PROCEDURE: Excisional debridement of left ischial ulcer  -2% viscous lidocaine applied topically to wound bed for approximately 5 minutes prior to debridement  -Curette, scissors used to debride wound bed.  Excisional debridement was performed to remove devitalized tissue until healthy, bleeding tissue was visualized.   Entire surface of wound, 3.22 cm² debrided.  Tissue debrided into the subcutaneous layer.    -Bleeding controlled with manual pressure.    -Wound care completed by wound RN, refer to flowsheet  -Patient tolerated the procedure well, without c/o pain or discomfort.      Pertinent Labs and Diagnostics:    Labs:     A1c: No results found for: \"HBA1C\"     [facility] results, 7/1/2022 (under media tab)    CRP 13    ESR 31      IMAGING:     X-ray left tib-fib ordered 2/16/2024 through [facility]    12/11/2023-CT of abdomen pelvis with contrast  IMPRESSION:   1.  Right ischial decubitus ulcer extending to bone with soft tissue gas tracking along the right perineum. Appearance suggesting osteomyelitis, consider component of necrotizing fasciitis as clinically appropriate.  2.  Small pericardial effusion  3.  Left adrenal nodule, density on prior noncontrast CT demonstrates adenoma.  4.  Hepatomegaly  5.  Enlarged prostate, workup and evaluation for causes of prostate enlargement recommended as clinically appropriate.  6.  Atherosclerosis and atherosclerotic coronary artery disease    12/15/2023-bone scan of left foot  IMPRESSION:     1.  Mild increased activity in the LEFT 1st and 3rd toes on blood pool and delayed images possibly indicating inflammation/infection.  2.  No significant blood flow asymmetry.          VASCULAR STUDIES: No results found.    LAST  WOUND CULTURE:   Lab Results   Component Value Date/Time    CULTRSULT  04/29/2024 08:01 PM     3 or more organisms isolated, culture of doubtful  significance, please " recollect.  Mixed enteric ade >100,000 cfu/mL        PATHOLOGY  2/17/2023-bone fragment extracted from left lower extremity wound\\  FINAL DIAGNOSIS:     A. Left leg bone fragment at base of chronic wound:          Extensively degenerated fibrocartilaginous tissue with a rim of           fibrinopurulent debris          Correlate with culture findings            Comment: While no residual intact bone is identified, these           findings are suggestive of adjacent osteomyelitis.      ASSESSMENT AND PLAN:     1. Sacral decubitus ulcer, stage IV ([facility])  Comments: Ulcer first noted in early April 2022 as small open area which quickly enlarged.  This ulcer is present distal from previous sacral ulcer which healed after surgery in January.  Patient has history of flap reconstruction x2 to this area.    5/15/2024: Wound remains resolved  -No excisional debridement required today  -Patient to return to clinic weekly for assessment and debridement  -Patient does spend a lot of time up in his wheelchair, and wishes to continue to do so for his quality of life.  He lives independently, drives, and is involved with family activities.  He does have a Planboxo wheelchair cushion, suspect may be inadequate. Will refer for repeat seating evaluation.  - Known OM that was previously treated. CT scan done during recent hospitalization did not show OM of sacrum, however OM of the ischium noted.    Wound care: Silicone foam pressure reliving dressing    2.  Pressure injury of right ischium, stage IV  Comments: Abscess and OM found on CT during hospitalization in December 2023.  Patient underwent I&D with VAC placement.  IV antibiotics through 1/22/2024.    5/15/2024: Wound measuring smaller, however slight increase in undermining.  Patient was in the ED on 4/29 with fever and chills.  Among other diagnostics, CT of abdomen and pelvis was repeated, showing OM of ischium.  He has already been treated with IV antibiotics for previous  finding of the same.  - Excisional debridement of nonviable tissue from wound in clinic today, medically necessary to promote wound healing including epibole.  -Home health has been instructed to continue Dakins soaked gauze to wound for 10 to 15 minutes prior to placement of new VAC dressing as needed for odor / slough.  -Continue wound VAC to ischial wound  -Patient to return to clinic weekly for assessment and debridement  -Patient admits that he has been up in his wheelchair for longer than usual, due to increased fatigue, possibly related to COVID  -Home health to change dressing 2 times per week in between clinic visits   -Patient is very well versed on pressure relief measures, has adequate surface on bed and will be referred back to [facility] for new seating evaluation.    Wound care:  NPWT at -125 mmHg to accelerate granulation, change 3 times per week, sacral offloading foam.    3. Postoperative wound dehiscence, subsequent encounter  4. Osteomyelitis of left lower extremity  Comments: On 4/26/2022 patient underwent irrigation and debridement of multiple compartments of the left lower extremity states for pressure injury, with bone excision, and complex closure of chronic wound using biologic skin substitute.  During postop visit in surgeons office on 5/11, surgical site was noted to be dehisced.      5/15/2024: Small open area to left medial lower leg wound, tissue friable.  This wound is waxed and waned, known underlying osteomyelitis.  -No excisional debridement of these wounds required today  -Patient to be mindful of offloading when supine, should assure that his leg is not rotating medially    Wound care: Silicone foam dressing, Tubigrip D    5. Deep tissue injury  6. Pressure injury of left foot, stage IV  Comments: DTI to posterior left lower leg first observed in clinic 7/29/2022.  Patient does not know how it started, had been wearing heel float boot consistently.  Evolved into stage IV pressure  injury    5/15/2024: Both wounds remain healed.    -High risk for recurrence, has opened and closed several times before  -Monitor site each clinic visit     Care: Open to air    7. Pressure injury of left heel, stage 3 ([facility])  Comments: First noted in clinic on 10/21/2022, originally noted to be stage II    5/15/2024: Wound appears to be healed again, covered with fragile epithelium  -Patient to return to clinic weekly for assessment   -Prevalon boot to alleviate pressure    Wound care: Silicone heel offloading dressing    8.  Pressure injury of left leg, stage III    5/15/2024: Remains healed, covered with thin, fragile epithelium with discoloration.  Skin appears is intact with no drainage.  -Wound is closed, however historically has closed and recurred multiple times  -Monitor each clinic visit   Wound Care: Open to air, Tubigrip to manage edema    9.  Pressure injury of left ischium, stage III    5/15/2024: Wound measuring larger, thick necrotic and fibrous slough.  -Excisional debridement of wound in clinic today, medically necessary to promote wound healing.  -Patient to return to clinic weekly for assessment and debridement  - As he developed new wound, will attempt to obtain patient new seating eval and possibly new custom pressure relieving chair cushion.  -Home health to change dressing 1-2 times per week in between clinic visits     Wound care: Honey gel, hydrofiber silver, offloading sacral dressing    10. Quadriplegia, C5-C7, incomplete ([facility])  Comments: Complicating factor.  Impaired mobility and sensation  -Patient is still spending 7-8 hours/day up in his wheelchair, he has Roho cushion nearly 5 years old, and knows to reposition frequently.  Wears heel float boots bilaterally at all times  Mobility Evaluation:  -Will refer patient to [facility] for Mobility Evaluation with chief complaint of mobility limitations that interfere with daily living activities. Patient would benefit from repeat seating  evaluation and custom seat cushion as he continues to develop pressure injuries to b/l ischium which is likely secondary to inadequate seat cushion. I strongly recommend evaluation for a custom seat cushion to limit risk of pressure injuries.  -Diagnosis that limits mobility: Incomplete quadriplegia who is wheelchair dependent.    -Type of mobility device prescribed: Seating evaluation and custom seat cushion.    My total time spent caring for the patient on the day of the encounter was 20 minutes, reviewing history, assessment, counseling and education, and coordination of care.  This does not include time spent on separately billable procedures/tests.        Please note that this note may have been created using voice recognition software. I have worked with technical experts from [facility] to optimize the interface.  I have made every reasonable attempt to correct obvious errors, but there may be errors of grammar and possibly content that I did not discover before finalizing the note.

## 2024-05-16 ENCOUNTER — TELEPHONE (OUTPATIENT)
Dept: WOUND CARE | Facility: MEDICAL CENTER | Age: 74
End: 2024-05-16
Payer: MEDICARE

## 2024-05-16 NOTE — TELEPHONE ENCOUNTER
Referral for new customized pressure relief cushion faxed to [facility] at [phone].

## 2024-05-16 NOTE — PATIENT INSTRUCTIONS
-Keep your wound dressing clean, dry, and intact. Only change dressing if it's over saturated, soiled or falls off.     -Change your dressing if it becomes soiled, soaked, or falls off.    -Remove your compression wrap if you have severe pain, severe swelling, numbness, color change, or temperature change in your toes. If you need to remove your compression wrap, do so by unrolling it. Do not cut the compression wrap off to prevent cutting yourself on accident.    -Wound vac may not have any drainage in tube or cannister & it will still be working.   Change cannister if it does become full by pressing tab on side of machine to remove canister and snap on new one. Full canister can be thrown in the trash. If cannister fills with bright red blood - go to ER. Dressing will be changed every week at the wound clinic.  If you are having issues with your wound VAC, please consider patching leaks, changing the canister, or calling [phone] for troubleshooting. If the wound VAC has been off or un-operational for over 2 hours, call wound care center to inform them and remove all dressings including black foam and replace with normal saline damp gauze.     -Should you experience any significant changes in your wound(s), such as infection (redness, swelling, localized heat, increased pain, fever > 101 F, chills) or have any questions regarding your home care instructions, please contact the wound center at [phone]. If after hours, contact your primary care physician or go to the hospital emergency room.

## 2024-05-22 ENCOUNTER — OFFICE VISIT (OUTPATIENT)
Dept: WOUND CARE | Facility: MEDICAL CENTER | Age: 74
End: 2024-05-22
Attending: INTERNAL MEDICINE
Payer: MEDICARE

## 2024-05-22 VITALS
DIASTOLIC BLOOD PRESSURE: 72 MMHG | OXYGEN SATURATION: 96 % | SYSTOLIC BLOOD PRESSURE: 118 MMHG | TEMPERATURE: 98.2 F | HEART RATE: 77 BPM | RESPIRATION RATE: 18 BRPM

## 2024-05-22 DIAGNOSIS — G82.54 QUADRIPLEGIA, C5-C7 INCOMPLETE (HCC): ICD-10-CM

## 2024-05-22 DIAGNOSIS — T81.31XD POSTOPERATIVE WOUND DEHISCENCE, SUBSEQUENT ENCOUNTER: ICD-10-CM

## 2024-05-22 DIAGNOSIS — L89.623 PRESSURE INJURY OF LEFT HEEL, STAGE 3 (HCC): ICD-10-CM

## 2024-05-22 DIAGNOSIS — L89.894 PRESSURE INJURY OF LEFT FOOT, STAGE 4 (HCC): ICD-10-CM

## 2024-05-22 DIAGNOSIS — L89.893 PRESSURE INJURY OF LEFT LEG, STAGE 3 (HCC): ICD-10-CM

## 2024-05-22 DIAGNOSIS — L89.154 SACRAL DECUBITUS ULCER, STAGE IV (HCC): ICD-10-CM

## 2024-05-22 DIAGNOSIS — T14.8XXA DEEP TISSUE INJURY: ICD-10-CM

## 2024-05-22 DIAGNOSIS — L89.314 PRESSURE INJURY OF RIGHT ISCHIUM, STAGE 4 (HCC): ICD-10-CM

## 2024-05-22 DIAGNOSIS — L89.323 PRESSURE INJURY OF LEFT ISCHIUM, STAGE 3 (HCC): ICD-10-CM

## 2024-05-22 DIAGNOSIS — M86.9 OSTEOMYELITIS OF LEFT LOWER EXTREMITY (HCC): ICD-10-CM

## 2024-05-22 PROCEDURE — 99213 OFFICE O/P EST LOW 20 MIN: CPT | Mod: 25 | Performed by: NURSE PRACTITIONER

## 2024-05-22 PROCEDURE — 11043 DBRDMT MUSC&/FSCA 1ST 20/<: CPT | Performed by: NURSE PRACTITIONER

## 2024-05-22 PROCEDURE — 3078F DIAST BP <80 MM HG: CPT | Performed by: NURSE PRACTITIONER

## 2024-05-22 PROCEDURE — 11042 DBRDMT SUBQ TIS 1ST 20SQCM/<: CPT | Mod: 59 | Performed by: NURSE PRACTITIONER

## 2024-05-22 PROCEDURE — 3074F SYST BP LT 130 MM HG: CPT | Performed by: NURSE PRACTITIONER

## 2024-05-22 NOTE — WOUND TEAM
HH order entered into [facility] and routed to [facility] electronically.

## 2024-05-22 NOTE — PATIENT INSTRUCTIONS
-Keep your wound dressing clean, dry, and intact.     -Change your dressing if it becomes soiled, soaked, or falls off.    -Wound vac may not have any drainage in tube or cannister & it will still be working.   Change cannister if it does become full by pressing tab on side of machine to remove canister and snap on new one. The full canister can be thrown in the trash. If the cannister fills with bright red blood - go to ER. Dressing will be changed every MWF at the wound clinic, or in combination with home health if applicable.  If you are having issues with your wound VAC, please consider patching leaks, changing the canister, or calling [phone] for troubleshooting. If the wound VAC has been off or un-operational for over 2 hours, notify the wound clinic (or home health if you have it) and remove all dressings including black foam and replace with normal saline damp gauze.     -Should you experience any significant changes in your wound(s), such as infection (redness, swelling, localized heat, increased pain, fever > 101 F, chills) or have any questions regarding your home care instructions, please contact the wound center at [phone]. If after hours, contact your primary care physician or go to the hospital emergency room.

## 2024-05-22 NOTE — PROGRESS NOTES
Provider Encounter- Pressure Injury        HISTORY OF PRESENT ILLNESS  Wound History:    START OF CARE IN CLINIC: 1/31/2024 (return to clinic after hospitalization)    REFERRING PROVIDER: Racquel York       WOUNDS-superior coccyx pressure injury, stage IV                                 Coccyx pressure injury, stage IV           Inferior coccyx pressure injury, stage IV                                 Right ischial pressure injury, stage IV                                 Left heel pressure injury, recurring stage III                                 Left posterior lower leg/calf-stage III                                 Left medial lower leg surgical wound dehiscence-resolved, reopens frequently            Left ischial pressure injury, stage III-first observed in clinic 5/1/2024          HISTORY: Patient with history of incomplete quadriplegia referred to [facility] for treatment of a stage IV pressure injury.  He has a history of previous pressure injuries to this area, and underwent muscle flaps in 2019, and then again in 2020.  He was seen in the wound clinic in November 2021 for an ulcer proximal from his current ulcer, and pressure injuries to his left posterior lower leg and left heel.  At that time, it was discovered that the patient had retained VAC foam embedded in the wound bed of the sacral wound.  Attempts were made to get him back to his plastic surgeon, though unsuccessful.  In January he underwent surgical removal of VAC sponge along with excisional debridement of his sacral wound by Dr. Chaves.  After the surgery, his wound went on to heal without incident.   In early April 2022, his home health nurse noted a new sacral ulcer, below the previous ulcer which quickly tripled in size over the following weeks.  The ulcer to his left medial lower leg had also deteriorated, with bone visible at the base..  He was hospitalized from 4/22 until 4/27/2022 and underwent surgery with Dr. Raman on 4/26 for  irrigation and debridement of multiple compartments of the left lower extremity, bone excision, and complex closure of chronic wound using biologic skin substitute.   His sacrococcygeal wound was not surgically addressed during this admission.  He was discharged back to his group home, with home health, and referral to outpatient wound clinic for his sacral wound.  He was instructed to follow-up with his surgeon for his lower leg wound.       Postoperatively, the left medial lower leg incision dehisced.  He was seen by his surgeon at [facility] on 5/11.  The surgeon opted to leave remaining sutures in place, and refer him to the wound clinic for treatment of this wound.   Treatment of this wound was initiated in clinic on 5/12.  During this visit was also noted that his heel DTI had resolved, but that he had a new pressure injury to his left posterior lower extremity.     A new pressure injury was noted to patient's right upper buttock/lower back on 5/20/2022.  Wound was linear in shape, skin discolored but intact.     Abrasion noted to left anterior lower leg.  First observed in clinic on 7/22/2022.  Patient states he bumped his leg into his food tray.     Small DTI noted to patient's left lateral lower leg on 7/29/2022.  Skin intact but discolored.     Large area of deep tissue injury noted to patient's left exterior lower leg.  Patient denied any trauma to this area.  Skin intact.  Wound documented.    1/27/2023: Patient was admitted to [facility] from 1/23-1/25/2023 with gross hematuria. He underwent RICHARD which showed watchman device was in place and he was taken off of Xarelto. While hospitalized wound team was consulted. He was referred back to [facility] and home health upon discharge.    Patient was hospitalized at [facility] for pyelonephritis from 2/26 until 3/2/2023, admitted for fever and general malaise.  He was admitted and initially started on linezolid and meropenem for suspected UTI and history of multidrug-resistant  organisms.  Urine cultures were negative. ID was consulted, recommended CT of chest and abdomen,which were negative for acute findings. However, he was treated with 5 days total course of antibiotics for suspected UTI, and symptoms completely resolved.  During this admission, the inpatient wound team was consulted for treatment of his sacral and lower leg wounds.  A wound culture was taken from his left heel pressure ulcer, negative.  Once stabilized, he was discharged home and referred back to [facility] to resume treatment of his wounds.    Patient was hospitalized at [facility] from 12/11 until 12/23/2023, admitted for fever.  Wound infection suspected.  CT scan of abdomen and pelvis for evaluation of sacral pressure injury showed gas tracking down to the bone consistent with osteomyelitis.  He underwent I&D of right ischial ulcer (documented as buttock) with Dr. Bansal, medial tract leading to an abscess was identified.  Cavity was opened allowing it to drain into the main wound bed.  Wound VAC was placed and managed by wound team during this admission.  A bone scan of patient's left foot was also done, initially concerning for osteomyelitis.  Orthopedic surgery was consulted and did not recommend surgical intervention. ID consulted also, recommended the patient to receive IV ertapenem 1 g every 24 hours plus IV daptomycin 8 mg/kg every 24 hours through 1/22/2024.   He was discharged to LTAC on 1/23 for IV antibiotics and wound care.  From the LTAC he was discharged home on 1/22 with home health and referral back to [facility] to resume management of his wounds  .      Pertinent Medical History: Incomplete quadriplegia, history of stage IV pressure injuries, history of flap procedures to sacral pressure injuries, osteomyelitis, obesity, colostomy in place   Contributing factors: Immobility and Obesity, impaired sensation    Personal support: Attendant-staff at snf and home health nursing    TOBACCO USE:   Former smoker, quit  in 1977.  Never used smokeless tobacco    Patient's problem list, allergies, and current medications reviewed and updated in Epic    Interval History:  Interval History thinned 7/29/2022.  Please see previous notes for complete interval history.   Interval History thinned 1/27/2023. Please see previous note for complete interval history.  Interval History thinned 3/3/2023.  Please see previous notes for complete interval history.    Interval History thinned 8/4/2023.  Please see previous notes for complete interval history.    Interval History thinned 1/31/2024.  Please see previous notes for complete interval history.      1/31/2024 Initial clinic visit with ADILSON Arthur, HOLDEN, CWDINESHN, CFCN.   Patient returns to clinic after hospitalization and LTAC stay.  VAC in place to right ischial wound.  Sacral wounds have progressed significantly since he was last seen in clinic.  Left medial wound has healed, though covered with friable tissue.  Left heel ulcer, previously resolved has reopened slightly.  He states that he is feeling well overall, denies fevers, chills, nausea, vomiting, cough or shortness of breath.     2/7/2024: Clinic visit with Steve Hodges MD. Patient reports feeling in normal state of health. Patient denies any alarms from wound VAC, however today he presented with significant odor from ischial wound and dressing was partially avulsed leaving in adequate suction. Machine was checked and functioning well, there were no recorded alarms.    2/16/24: Clinic visit with Dana DE ANDA, HOLDEN, CWON, CFCN.  Pt denies fevers, chills, nausea, vomiting.  Left shin fluctuance to wound with hematoma and dark sanguinous drainage.  Bone fragment removed during debridement.  Obtain x-ray as residual bone palpated.  [facility] through [facility].  Coccyx wound has decreased from 3 ulcers to now 1.  Right ischial wound with slough, odor.  Dakin soaked performed during visit.  Wound  debrided.  Able to continue VAC postdebridement.     2/21/2024: Clinic visit with Steve Hodges MD. Patient reports doing ok, reports feeling well. Left medial lower extremity wound is measuring larger with thick slough and friable tissue. Xray completed today, will undoubtedly demonstrate known OM. Patient denies any issues with wound VAC. Home health has been soaking Ischial wound with Dakins prior to applying wound VAC.    2/28/2024: Clinic visit with Steve Hodges MD. Patient recently tested positive for COVID. Reports some congestion, cough, and brain fog. Denies other symptoms. Patient with new wounds to left lower extremity undoubtedly associated with known underlying OM. Patient's sacral and ischial wound appear to be improving, measuring smaller.    3/6/2024: Clinic visit with Steve Hodges MD. Patient reports feeling much better, COVID symptoms have resolved. Patient's left lower extremity wounds are stable, posterior leg wounds appear resolved. Patient sacral wound is stable and ischial wound is improving.    3/13/2024 : Clinic visit with ADILSON Arthur, FNP-BC, CWOCN, CFCN.   Patient states he is feeling well, offers no complaints.  Left lower extremity wounds continue to wax and wane.  Sacral and ischial wounds slowly progressing.    3/20/2024: Clinic visit with Steve Hodges MD. Patient reports doing ok. Denies any acute issues. Leg wounds continue to wax and wane. He reports there was more slough and increased odor from ischial wound so home health packed with dakins prior to applying VAC. No odor today in clinic.    3/27/2024: Clinic visit with Steve Hodges MD. Patient reports doing ok. Unfortunately home health did not pre-drape bridging the trac pad from wound VAC and right buttocks / hip skin is irritated and at risk of breaking down. Left leg wounds have improved. Sacral and ischial pressure injuries are stable.    4/3/2024: Clinic visit with Steve Hodges MD. Patient reports  doing well, denies any acute issues. Leg wounds are all improving, few new pinpoint areas of skin breakdown. Sacral and ischial pressure injuries slightly improved. Skin on right buttocks and hip has improved and did not fully breakdown.    4/10/2024 : Clinic visit with ADILSON Arthur, HOLDEN, IMAN, LILIYA.   Patient continues to feel well.  Today, all of his left lower extremity wounds are healed, though historically have tended to open and close.  Sacral and ischial wounds both measure bit larger today.    4/17/2024 : Clinic visit with ADILSON Arthur, HOLDEN, LILIYA VALERIO.   Anjum states he is feeling very well.  Left lower extremity wounds remain closed.  Sacral/coccyx ulcers and right ischial ulcer continue to wax and wane.      4/24/2024 : Clinic visit with ADILSON Arthur, HOLDEN, LILIYA VALERIO.   Anjum states he is feeling well.  He is left medial lower leg wound, and left heel wounds have reopened slightly, neither of which required excisional debridement today.  Sacral and ischial wounds are slowly progressing.   He has not yet obtained quarter strength Dakin's for soaks with VAC dressing change.  He decided to order from [employer].  However, I also sent an order to [facility] last week.    5/1/2024 : Clinic visit with ADILSON Arthur, HOLDEN, IMAN, LILIYA.   Anjum is not feeling well, very fatigued.  He is again COVID-positive, was in the ED 2 nights ago with fever and chills.  Chest x-ray showed no evidence of pneumonia.  A CT of his abdomen and pelvis was also done, showed apparent bone resorption of the ischial tuberosity, suggesting osteomyelitis-this is not a new finding.  Patient was treated for ischial OM in December 2023 and January of this year.  He was discharged home on cefdinir.   Today he is afebrile, but states he has been extremely fatigued.  He admits to sitting in his wheelchair more than usual.  He does have a new pressure injury to his left ischium, treatment initiated in  clinic today.    5/8/2024: Clinic visit with Steve Hodges MD. Patient reports feeling much better, reports has recovered from COVID and feeling in normal state of health. His sacral wound has resolved. Right ischial wound measuring smaller. Left ischial wound largely unchanged with thick adherent slough.    5/15/2024: Clinic visit with Steve Hodges MD. Patient reports feeling in normal state of health. He received a letter from medicare denying appeal to continue wound VAC. Clinic staff looking into this as he has been making progress with VAC use, appears that order to continue NPWT was sent to different facility. I have signed order to continue NPWT. Patient continues to develop new pressure injuries and worsening of left ischial wound. He has a roho wheelchair cushion, but with new wounds this may be inadequate. Will order new seating eval from [employer].    5/22/2024 : Clinic visit with ADILSON Arthur, FNPEGGY-BC, CWDINESHN, CFCN.   Patient states he is feeling well overall.  His left ischial ulcer has deteriorated, increased necrotic tissue.  He states that he has been sitting more than usual while a friend of his was visiting from out of town.  He has heard from [employer], and there will be someone coming to his home to do a seating eval in the next few weeks.      REVIEW OF SYSTEMS:   Unchanged from previous wound clinic assessment on 5/15/2024, except as noted in interval history above.         PHYSICAL EXAMINATION:   /72   Pulse 77   Temp 36.8 °C (98.2 °F) (Temporal)   Resp 18   SpO2 96%   Physical Exam  Constitutional:       Appearance: He is obese.   Cardiovascular:      Rate and Rhythm: Normal rate.   Pulmonary:      Effort: Pulmonary effort is normal.   Abdominal:      Comments: Colostomy left lower quadrant   Genitourinary:     Comments: Suprapubic catheter to down drain   Skin:     Comments: Stage IV coccygeal pressure injury -remains resolved    Stage IV pressure injury to right  ischium-wound area has decreased slightly, depth of wound bed and undermining about the same.  Moderate serosanguineous drainage, no odor.  Areas of new granulation tissue    Full-thickness wound to left medial lower leg, surgical wound dehiscence-small open wound with poor tissue quality.  Area about the same.  Minimal to moderate serosanguineous drainage.  No evidence of infection    Stage III pressure injury left posterior heel -remains healed with fragile epithelium    Stage III pressure injury to posterior left lower leg- remains resolved with area of discoloration, skin intact    Stage III pressure injury of left ischium-wound area has increased significantly.  Tissue quality, with increased nonviable tissue to wound bed, adherent slough.  Minimal serosanguineous drainage.  No odor.  No periwound erythema or induration     Neurological:      Mental Status: He is alert and oriented to person, place, and time.   Psychiatric:         Mood and Affect: Mood normal.         WOUND ASSESSMENT  Wound 12/12/23 Pressure Injury Ischium Right (Active)   Wound Image    05/22/24 1500   Site Assessment Red;[place] 05/15/24 1500   Periwound Assessment Scar tissue 05/15/24 1500   Margins Unattached edges 05/22/24 1500   Drainage Amount Moderate 05/15/24 1500   Drainage Description Serosanguineous 05/15/24 1500   Treatments Cleansed;Provider debridement 05/15/24 1500   Offloading/DME Other (comment) 03/27/24 1625   Wound Cleansing Hypochlorus Acid 05/15/24 1500   Periwound Protectant Skin Protectant Wipes to Periwound;Drape 05/15/24 1500   Dressing Changed Changed 05/15/24 1500   Dressing Cleansing/Solutions Not Applicable 05/15/24 1500   Dressing Options Wound Vac;Offloading Dressing - Sacral 05/15/24 1500   Dressing Change/Treatment Frequency Monday, Wednesday, Friday, and As Needed 05/22/24 1500   Wound Team Following Weekly 05/15/24 1500   WOUND NURSE ONLY - Pressure Injury Stage 4 05/15/24 1500   Non-staged Wound Description  Not applicable 05/08/24 1500   Wound Length (cm) 3.1 cm 05/22/24 1500   Wound Width (cm) 3.1 cm 05/22/24 1500   Wound Depth (cm) 1.6 cm 05/22/24 1500   Wound Surface Area (cm^2) 9.61 cm^2 05/22/24 1500   Wound Volume (cm^3) 15.376 cm^3 05/22/24 1500   Post-Procedure Length (cm) 3.2 cm 05/22/24 1500   Post-Procedure Width (cm) 3.2 cm 05/22/24 1500   Post-Procedure Depth (cm) 1.7 cm 05/22/24 1500   Post-Procedure Surface Area (cm^2) 10.24 cm^2 05/22/24 1500   Post-Procedure Volume (cm^3) 17.408 cm^3 05/22/24 1500   Wound Healing % 73 05/22/24 1500   Wound Bed Granulation (%) 100 % 03/06/24 1000   Tunneling (cm) 0 cm 05/22/24 1500   Tunneling Clock Position of Wound 2 03/06/24 1000   Undermining (cm) 3.5 cm 05/22/24 1500   Undermining of Wound, 1st Location From 5 o'clock;To 2 o'clock 05/22/24 1500   Undermining (cm) - 2nd location 2.5 cm 02/16/24 1620   Undermining of Wound, 2nd Location From 7 o'clock;To 11 o'clock 02/16/24 1620   Wound Odor None 05/22/24 1500   Exposed Structures None 05/22/24 1500   Number of days: 162       Wound 01/31/24 Pressure Injury Coccyx Inferior wound, previosly closed but reopened 1/31/24 (Active)   Wound Image   05/22/24 1500   Site Assessment Epithelialization 05/22/24 1500   Periwound Assessment Scar tissue 05/22/24 1500   Margins Attached edges 05/22/24 1500   Drainage Amount None 05/22/24 1500   Drainage Description Serosanguineous 05/01/24 1600   Treatments Cleansed 05/22/24 1500   Wound Cleansing Normal Saline Irrigation 05/22/24 1500   Periwound Protectant Barrier Paste 05/22/24 1500   Dressing Cleansing/Solutions Not Applicable 05/22/24 1500   Dressing Options Hydrofiber Silver;Offloading Dressing - Sacral 05/22/24 1500   Dressing Change/Treatment Frequency Monday, Wednesday, Friday, and As Needed 05/08/24 1500   Wound Team Following Weekly 05/22/24 1500   WOUND NURSE ONLY - Pressure Injury Stage 3 05/22/24 1500   Wound Length (cm) 0 cm 05/22/24 1500   Wound Width (cm) 0 cm  05/22/24 1500   Wound Depth (cm) 0 cm 05/22/24 1500   Wound Surface Area (cm^2) 0 cm^2 05/22/24 1500   Wound Volume (cm^3) 0 cm^3 05/22/24 1500   Post-Procedure Length (cm) 0.8 cm 04/24/24 1530   Post-Procedure Width (cm) 1 cm 04/24/24 1530   Post-Procedure Depth (cm) 0.4 cm 04/24/24 1530   Post-Procedure Surface Area (cm^2) 0.8 cm^2 04/24/24 1530   Post-Procedure Volume (cm^3) 0.32 cm^3 04/24/24 1530   Wound Healing % 100 05/22/24 1500   Wound Bed Slough (%) 30 % 03/06/24 1000   Tunneling (cm) 0 cm 05/22/24 1500   Undermining (cm) 0 cm 05/22/24 1500   Undermining of Wound, 1st Location From 11 o'clock;To 1 o'clock 03/13/24 1545   Wound Odor None 05/22/24 1500   Exposed Structures None 05/22/24 1500   Number of days: 112       Wound 02/16/24 Full Thickness Wound Leg Medial Left (Active)   Wound Image   05/22/24 1500   Site Assessment Red;Other (Comment) 05/22/24 1500   Periwound Assessment Hyperpigmented;Edema;Fragile 05/22/24 1500   Margins Attached edges 05/22/24 1500   Drainage Amount Small 05/22/24 1500   Drainage Description Serosanguineous 05/22/24 1500   Treatments Cleansed 05/22/24 1500   Wound Cleansing Hypochlorus Acid 05/22/24 1500   Periwound Protectant Skin Protectant Wipes to Periwound 05/22/24 1500   Dressing Cleansing/Solutions Not Applicable 05/22/24 1500   Dressing Options [place];Offloading Dressing - Sacral;Tubigrip;Other (Comments) 05/22/24 1500   Dressing Change/Treatment Frequency Monday, Wednesday, Friday, and As Needed 05/22/24 1500   Wound Team Following Weekly 05/22/24 1500   Non-staged Wound Description Full thickness 05/22/24 1500   Wound Length (cm) 0.6 cm 05/22/24 1500   Wound Width (cm) 0.5 cm 05/22/24 1500   Wound Depth (cm) 0.4 cm 05/22/24 1500   Wound Surface Area (cm^2) 0.3 cm^2 05/22/24 1500   Wound Volume (cm^3) 0.12 cm^3 05/22/24 1500   Post-Procedure Length (cm) 0.3 cm 04/24/24 1530   Post-Procedure Width (cm) 0.3 cm 04/24/24 1530   Post-Procedure Depth (cm) 0.1 cm  04/24/24 1530   Post-Procedure Surface Area (cm^2) 0.09 cm^2 04/24/24 1530   Post-Procedure Volume (cm^3) 0.009 cm^3 04/24/24 1530   Wound Healing % 95 05/22/24 1500   Tunneling (cm) 0 cm 05/22/24 1500   Undermining (cm) 0 cm 05/22/24 1500   Wound Odor None 05/22/24 1500   Exposed Structures None 05/22/24 1500   Number of days: 96       Wound 05/01/24 Pressure Injury Ischium Left (Active)   Wound Image    05/22/24 1500   Site Assessment Grey;Yellow;Red 05/22/24 1500   Periwound Assessment Intact;Blanchable erythema 05/22/24 1500   Margins Attached edges;Unattached edges 05/22/24 1500   Closure Secondary intention 05/08/24 1500   Drainage Amount Moderate 05/22/24 1500   Drainage Description Serosanguineous 05/22/24 1500   Treatments Cleansed;Provider debridement 05/22/24 1500   Wound Cleansing Dakin's Solution 05/22/24 1500   Periwound Protectant Barrier Paste 05/22/24 1500   Dressing Status Intact 05/01/24 1600   Dressing Changed Changed 05/22/24 1500   Dressing Cleansing/Solutions Not Applicable 05/22/24 1500   Dressing Options Honey Gel;Hydrofiber;Offloading Dressing - Sacral 05/22/24 1500   Dressing Change/Treatment Frequency Monday, Wednesday, Friday, and As Needed 05/22/24 1500   Wound Team Following Weekly 05/22/24 1500   WOUND NURSE ONLY - Pressure Injury Stage 3 05/22/24 1500   Non-staged Wound Description Full thickness 05/22/24 1500   Wound Length (cm) 3.4 cm 05/22/24 1500   Wound Width (cm) 3.3 cm 05/22/24 1500   Wound Depth (cm) 1.3 cm 05/22/24 1500   Wound Surface Area (cm^2) 11.22 cm^2 05/22/24 1500   Wound Volume (cm^3) 14.586 cm^3 05/22/24 1500   Post-Procedure Length (cm) 4 cm 05/22/24 1500   Post-Procedure Width (cm) 3.6 cm 05/22/24 1500   Post-Procedure Depth (cm) 1.6 cm 05/22/24 1500   Post-Procedure Surface Area (cm^2) 14.4 cm^2 05/22/24 1500   Post-Procedure Volume (cm^3) 23.04 cm^3 05/22/24 1500   Wound Healing % -6530 05/22/24 1500   Tunneling (cm) 0 cm 05/22/24 1500   Undermining (cm) 0 cm  "05/22/24 1500   Wound Odor Mild;Foul 05/22/24 1500   Exposed Structures KEN 05/22/24 1500   Number of days: 21       PROCEDURE: Excisional debridement of right ischial ulcer   -Curette used to debride wound bed. Excisional debridement was performed to remove devitalized tissue until healthy, bleeding tissue was visualized.  Total wound area debrided approximately 12.0 cm², including into undermining.  Tissue debrided into the muscle / fascia layer  -Bleeding controlled with manual pressure.    -Wound care completed by wound RN, refer to flowsheet  -Patient tolerated the procedure well, without c/o pain or discomfort.      PROCEDURE: Excisional debridement of left ischial ulcer  -2% viscous lidocaine applied topically to wound bed for approximately 5 minutes prior to debridement  -Curette, scalpel and forceps used to debride wound bed.  Excisional debridement was performed to remove devitalized tissue until healthy, bleeding tissue was visualized.  Unable to establish 100% clean wound bed due to adherent nature of tissue.  Entire surface of wound, 14.4 cm² debrided.  Tissue debrided into the subcutaneous layer.    -Scalpel used to crosshatch remaining slough to wound bed  -Bleeding controlled with manual pressure.    -Wound care completed by wound RN, refer to flowsheet  -Patient tolerated the procedure well, without c/o pain or discomfort.      Pertinent Labs and Diagnostics:    Labs:     A1c: No results found for: \"HBA1C\"     [facility] results, 7/1/2022 (under media tab)    CRP 13    ESR 31      IMAGING:     X-ray left tib-fib ordered 2/16/2024 through [facility]    12/11/2023-CT of abdomen pelvis with contrast  IMPRESSION:   1.  Right ischial decubitus ulcer extending to bone with soft tissue gas tracking along the right perineum. Appearance suggesting osteomyelitis, consider component of necrotizing fasciitis as clinically appropriate.  2.  Small pericardial effusion  3.  Left adrenal nodule, density on " prior noncontrast CT demonstrates adenoma.  4.  Hepatomegaly  5.  Enlarged prostate, workup and evaluation for causes of prostate enlargement recommended as clinically appropriate.  6.  Atherosclerosis and atherosclerotic coronary artery disease    12/15/2023-bone scan of left foot  IMPRESSION:     1.  Mild increased activity in the LEFT 1st and 3rd toes on blood pool and delayed images possibly indicating inflammation/infection.  2.  No significant blood flow asymmetry.          VASCULAR STUDIES: No results found.    LAST  WOUND CULTURE:   Lab Results   Component Value Date/Time    CULTRSULT  04/29/2024 08:01 PM     3 or more organisms isolated, culture of doubtful  significance, please recollect.  Mixed enteric ade >100,000 cfu/mL        PATHOLOGY  2/17/2023-bone fragment extracted from left lower extremity wound\\  FINAL DIAGNOSIS:     A. Left leg bone fragment at base of chronic wound:          Extensively degenerated fibrocartilaginous tissue with a rim of           fibrinopurulent debris          Correlate with culture findings            Comment: While no residual intact bone is identified, these           findings are suggestive of adjacent osteomyelitis.      ASSESSMENT AND PLAN:     1. Sacral decubitus ulcer, stage IV (HCC)  Comments: Ulcer first noted in early April 2022 as small open area which quickly enlarged.  This ulcer is present distal from previous sacral ulcer which healed after surgery in January.  Patient has history of flap reconstruction x2 to this area.    5/22/2024: Wound remains resolved.  High risk for recurrence  -No excisional debridement required today  -Patient to return to clinic weekly for assessment and debridement  -Patient does spend a lot of time up in his wheelchair, and wishes to continue to do so for his quality of life.  He lives independently, drives, and is involved with family activities.  [facility], suspect may be inadequate. Will refer for  repeat seating evaluation.  - Known OM that was previously treated. CT scan done during recent hospitalization did not show OM of sacrum, however OM of the ischium noted.    Wound care: Silicone foam pressure reliving dressing    2.  Pressure injury of right ischium, stage IV  Comments: Abscess and OM found on CT during hospitalization in December 2023.  Patient underwent I&D with VAC placement.  IV antibiotics through 1/22/2024.    5/22/2024: Wound area has decreased, however depth of wound and undermining about the same.  - Excisional debridement of nonviable tissue from wound in clinic today, medically necessary to promote wound healing including epibole.  -Home health has been instructed to continue Dakins soaked gauze to wound for 10 to 15 minutes prior to placement of new VAC dressing as needed for odor / slough.  -Continue wound VAC to right ischial wound  -Patient to return to clinic weekly for assessment and debridement  -Patient admits that he has been up in his wheelchair for longer than usual while he had a friend visiting from out of town and  -Home health to change dressing 2 times per week in between clinic visits   -Patient is very well versed on pressure relief measures, has adequate surface on bed, alternating low air loss.  -Seating eval ordered through [facility] last week.  Patient has been contacted and there will be someone coming to his home in the next week or so for seating eval    Wound care:  NPWT at -125 mmHg to accelerate granulation, change 3 times per week, sacral offloading foam.    3. Postoperative wound dehiscence, subsequent encounter  4. Osteomyelitis of left lower extremity  Comments: On 4/26/2022 patient underwent irrigation and debridement of multiple compartments of the left lower extremity states for pressure injury, with bone excision, and complex closure of chronic wound using biologic skin substitute.  During postop visit in surgeons office on 5/11, surgical site was noted  to be dehisced.      5/22/2024: Small open wound with friable tissue.  Wound has waxed and waned over the course of treatment.  Known osteomyelitis.  Patient has declined amputation  -No excisional debridement of these wounds required today  -Patient to be mindful of offloading when supine, should assure that his leg is not rotating medially    Wound care: Silicone foam dressing, Tubigrip D    5. Deep tissue injury  6. Pressure injury of left foot, stage IV  Comments: DTI to posterior left lower leg first observed in clinic 7/29/2022.  Patient does not know how it started, had been wearing heel float boot consistently.  Evolved into stage IV pressure injury    5/22/2024: Both wounds remain resolved.    -High risk for recurrence, has opened and closed several times before  -Monitor site each clinic visit     Care: Open to air    7. Pressure injury of left heel, stage 3 ([facility])  Comments: First noted in clinic on 10/21/2022, originally noted to be stage II    5/22/2024: Wound remains healed, covered with fragile epithelium.  -High risk for recurrence, monitor each clinic visit  -Prevalon boot to alleviate pressure    Wound care: Silicone heel offloading dressing    8.  Pressure injury of left leg, stage III    5/15/2024: Remains closed, fragile epithelium  -Wound is closed, however historically has closed and recurred multiple times  -Monitor each clinic visit   Wound Care: Open to air, Tubigrip to manage edema    9.  Pressure injury of left ischium, stage III    5/22/2024: Wound area has increased.  Wound bed covered with thick adherent slough  -Excisional debridement of wound in clinic today, medically necessary to promote wound healing.  -Patient to return to clinic weekly for assessment and debridement  -Will plan to start wound VAC as soon as wound bed adequately prepared  -Home health to change dressing 1-2 times per week in between clinic visits     Wound care: Honey gel, hydrofiber silver, offloading sacral  dressing    10. Quadriplegia, C5-C7, incomplete ([facility])  Comments: Complicating factor.  Impaired mobility and sensation  -Patient is still spending 7-8 hours/day up in his wheelchair, he has Roho cushion nearly 5 years old, and knows to reposition frequently.  Wears heel float boots bilaterally at all times  Mobility Evaluation:  -Will refer patient to [facility] for Mobility Evaluation with chief complaint of mobility limitations that interfere with daily living activities. Patient would benefit from repeat seating evaluation and custom seat cushion as he continues to develop pressure injuries to b/l ischium which is likely secondary to inadequate seat cushion. I strongly recommend evaluation for a custom seat cushion to limit risk of pressure injuries.  -Diagnosis that limits mobility: Incomplete quadriplegia who is wheelchair dependent.    -Type of mobility device prescribed: Seating evaluation and custom seat cushion.    My total time spent caring for the patient on the day of the encounter was 20 minutes, reviewing history, assessment, counseling and education, and coordination of care.  This does not include time spent on separately billable procedures/tests.        Please note that this note may have been created using voice recognition software. I have worked with technical experts from [facility] to optimize the interface.  I have made every reasonable attempt to correct obvious errors, but there may be errors of grammar and possibly content that I did not discover before finalizing the note.   (4) rarely moist

## 2024-05-29 ENCOUNTER — TELEPHONE (OUTPATIENT)
Dept: WOUND CARE | Facility: MEDICAL CENTER | Age: 74
End: 2024-05-29

## 2024-05-29 ENCOUNTER — OFFICE VISIT (OUTPATIENT)
Dept: WOUND CARE | Facility: MEDICAL CENTER | Age: 74
End: 2024-05-29
Attending: INTERNAL MEDICINE
Payer: MEDICARE

## 2024-05-29 VITALS
HEART RATE: 82 BPM | SYSTOLIC BLOOD PRESSURE: 124 MMHG | DIASTOLIC BLOOD PRESSURE: 80 MMHG | OXYGEN SATURATION: 94 % | TEMPERATURE: 99 F | RESPIRATION RATE: 18 BRPM

## 2024-05-29 DIAGNOSIS — M86.9 OSTEOMYELITIS OF LEFT LOWER EXTREMITY (HCC): ICD-10-CM

## 2024-05-29 DIAGNOSIS — G82.54 QUADRIPLEGIA, C5-C7 INCOMPLETE (HCC): ICD-10-CM

## 2024-05-29 DIAGNOSIS — T14.8XXA DEEP TISSUE INJURY: ICD-10-CM

## 2024-05-29 DIAGNOSIS — T14.8XXA INFECTED WOUND: ICD-10-CM

## 2024-05-29 DIAGNOSIS — L89.314 PRESSURE INJURY OF RIGHT ISCHIUM, STAGE 4 (HCC): ICD-10-CM

## 2024-05-29 DIAGNOSIS — L89.893 PRESSURE INJURY OF LEFT LEG, STAGE 3 (HCC): ICD-10-CM

## 2024-05-29 DIAGNOSIS — L89.324 PRESSURE INJURY OF LEFT ISCHIUM, STAGE 4 (HCC): ICD-10-CM

## 2024-05-29 DIAGNOSIS — L89.894 PRESSURE INJURY OF LEFT FOOT, STAGE 4 (HCC): ICD-10-CM

## 2024-05-29 DIAGNOSIS — L89.623 PRESSURE INJURY OF LEFT HEEL, STAGE 3 (HCC): ICD-10-CM

## 2024-05-29 DIAGNOSIS — L08.9 INFECTED WOUND: ICD-10-CM

## 2024-05-29 DIAGNOSIS — L89.154 SACRAL DECUBITUS ULCER, STAGE IV (HCC): ICD-10-CM

## 2024-05-29 DIAGNOSIS — T81.31XD POSTOPERATIVE WOUND DEHISCENCE, SUBSEQUENT ENCOUNTER: ICD-10-CM

## 2024-05-29 RX ORDER — AMOXICILLIN AND CLAVULANATE POTASSIUM 875; 125 MG/1; MG/1
1 TABLET, FILM COATED ORAL 2 TIMES DAILY
Qty: 20 TABLET | Refills: 0 | Status: SHIPPED | OUTPATIENT
Start: 2024-05-29 | End: 2024-06-08

## 2024-05-29 NOTE — TELEPHONE ENCOUNTER
Submitted Xray request for Pelvis, focus on  bilateral ischium to rule out osteomyelitis through [facility] portal for portable xray to be done at home. .

## 2024-05-29 NOTE — WOUND TEAM
Upon removal of wound vac dressing applied by HH, area without predraping noted. Small amount of skin breakdown noted at this area. This RN placed call to [facility] owner Etienne to report finding. Per pt and Etienne, this was done by the same nurse who did this previously with him and has already been reeducated. Etienne indicated this nurse would be removed from this pt's care and possibly no longer be employed with his agency.    HH order entered into Epic and routed electronically to [facility] agency.

## 2024-05-29 NOTE — PATIENT INSTRUCTIONS
-Wound vac may not have any drainage in tube or cannister & it will still be working.   Change cannister if it does become full by pressing tab on side of machine to remove canister and snap on new one. The full canister can be thrown in the trash. If the cannister fills with bright red blood - go to ER. Dressing will be changed every MWF at the wound clinic, or in combination with home health if applicable.  If you are having issues with your wound VAC, please consider patching leaks, changing the canister, or calling [phone] for troubleshooting. If the wound VAC has been off or un-operational for over 2 hours, notify the wound clinic (or home health if you have it) and remove all dressings including black foam and replace with normal saline damp gauze.     -Change colostomies every 5-7 days. Change appliance immediately if it is leaking or peristomal skin feels irritated, has itching, or  burning.   To change the appliance, remove previous appliance, cleanse peristomal skin with warm water/washcloth, pat dry, make an ostomy template or use cardboard measuring guide and trace ostomy shape onto back of barrier, cut out barrier, apply a paste ring around barrier opening and apply appliance. Empty pouches when no more than ½ full. Check contents every 2 hours or as needed. Do not leave soap residue on tissue and do not use baby wipes or skin prep wipes.     -Should you experience any significant changes in your wound(s), such as infection (redness, swelling, localized heat, increased pain, fever > 101 F, chills) or have any questions regarding your home care instructions, please contact the wound center at [phone]. If after hours, contact your primary care physician or go to the hospital emergency room.

## 2024-05-29 NOTE — PROGRESS NOTES
Provider Encounter- Pressure Injury        HISTORY OF PRESENT ILLNESS  Wound History:    START OF CARE IN CLINIC: 1/31/2024 (return to clinic after hospitalization)    REFERRING PROVIDER: Racquel York       WOUNDS-superior coccyx pressure injury, stage IV                                 Coccyx pressure injury, stage IV           Inferior coccyx pressure injury, stage IV                                 Right ischial pressure injury, stage IV                                 Left heel pressure injury, recurring stage III                                 Left posterior lower leg/calf-stage III                                 Left medial lower leg surgical wound dehiscence-resolved, reopens frequently            Left ischial pressure injury, stage III-first observed in clinic 5/1/2024          HISTORY: Patient with history of incomplete quadriplegia referred to [facility] for treatment of a stage IV pressure injury.  He has a history of previous pressure injuries to this area, and underwent muscle flaps in 2019, and then again in 2020.  He was seen in the wound clinic in November 2021 for an ulcer proximal from his current ulcer, and pressure injuries to his left posterior lower leg and left heel.  At that time, it was discovered that the patient had retained VAC foam embedded in the wound bed of the sacral wound.  Attempts were made to get him back to his plastic surgeon, though unsuccessful.  In January he underwent surgical removal of VAC sponge along with excisional debridement of his sacral wound by Dr. Chaves.  After the surgery, his wound went on to heal without incident.   In early April 2022, his home health nurse noted a new sacral ulcer, below the previous ulcer which quickly tripled in size over the following weeks.  The ulcer to his left medial lower leg had also deteriorated, with bone visible at the base..  He was hospitalized from 4/22 until 4/27/2022 and underwent surgery with Dr. Raman on 4/26 for  irrigation and debridement of multiple compartments of the left lower extremity, bone excision, and complex closure of chronic wound using biologic skin substitute.   His sacrococcygeal wound was not surgically addressed during this admission.  He was discharged back to his group home, with home health, and referral to outpatient wound clinic for his sacral wound.  He was instructed to follow-up with his surgeon for his lower leg wound.       Postoperatively, the left medial lower leg incision dehisced.  He was seen by his surgeon at [facility] on 5/11.  The surgeon opted to leave remaining sutures in place, and refer him to the wound clinic for treatment of this wound.   Treatment of this wound was initiated in clinic on 5/12.  During this visit was also noted that his heel DTI had resolved, but that he had a new pressure injury to his left posterior lower extremity.     A new pressure injury was noted to patient's right upper buttock/lower back on 5/20/2022.  Wound was linear in shape, skin discolored but intact.     Abrasion noted to left anterior lower leg.  First observed in clinic on 7/22/2022.  Patient states he bumped his leg into his food tray.     Small DTI noted to patient's left lateral lower leg on 7/29/2022.  Skin intact but discolored.     Large area of deep tissue injury noted to patient's left exterior lower leg.  Patient denied any trauma to this area.  Skin intact.  Wound documented.    1/27/2023: [place] [facility] [place] gross hematuria. He underwent RICHARD which showed watchman device was in place and he was taken off of Xarelto. While hospitalized wound team was consulted. He was referred back to [facility] and home health upon discharge.    Patient was hospitalized at [facility] for pyelonephritis from 2/26 until 3/2/2023, admitted for fever and general malaise.  He was admitted and initially started on linezolid and meropenem for suspected UTI and history of multidrug-resistant  organisms.  Urine cultures were negative. ID was consulted, recommended CT of chest and abdomen,which were negative for acute findings. However, he was treated with 5 days total course of antibiotics for suspected UTI, and symptoms completely resolved.  During this admission, the inpatient wound team was consulted for treatment of his sacral and lower leg wounds.  A wound culture was taken from his left heel pressure ulcer, negative.  Once stabilized, he was discharged home and referred back to [facility] to resume treatment of his wounds.    Patient was hospitalized at [facility] from 12/11 until 12/23/2023, admitted for fever.  Wound infection suspected.  CT scan of abdomen and pelvis for evaluation of sacral pressure injury showed gas tracking down to the bone consistent with osteomyelitis.  He underwent I&D of right ischial ulcer (documented as buttock) with Dr. Bansal, medial tract leading to an abscess was identified.  Cavity was opened allowing it to drain into the main wound bed.  Wound VAC was placed and managed by wound team during this admission.  A bone scan of patient's left foot was also done, initially concerning for osteomyelitis.  Orthopedic surgery was consulted and did not recommend surgical intervention. ID consulted also, recommended the patient to receive IV ertapenem 1 g every 24 hours plus IV daptomycin 8 mg/kg every 24 hours through 1/22/2024.   He was discharged to LTAC on 1/23 for IV antibiotics and wound care.  From the LTAC he was discharged home on 1/22 with home health and referral back to [facility] to resume management of his wounds  .      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  Never used smokeless tobacco    Patient's problem list, allergies, and current medications reviewed and updated in Epic    Interval History:  Interval History thinned 7/29/2022.  Please see previous notes for complete interval history.   Interval History thinned 1/27/2023. Please see previous note for complete interval history.  Interval History thinned 3/3/2023.  Please see previous notes for complete interval history.    Interval History thinned 8/4/2023.  Please see previous notes for complete interval history.    Interval History thinned 1/31/2024.  Please see previous notes for complete interval history.      1/31/2024 Initial clinic visit with ADILSON Arthur, HOLDEN, CWDINESHN, CFCN.   Patient returns to clinic after hospitalization and LTAC stay.  VAC in place to right ischial wound.  Sacral wounds have progressed significantly since he was last seen in clinic.  Left medial wound has healed, though covered with friable tissue.  Left heel ulcer, previously resolved has reopened slightly.  He states that he is feeling well overall, denies fevers, chills, nausea, vomiting, cough or shortness of breath.     2/7/2024: Clinic visit with Steve Hodges MD. Patient reports feeling in normal state of health. Patient denies any alarms from wound VAC, however today he presented with significant odor from ischial wound and dressing was partially avulsed leaving in adequate suction. Machine was checked and functioning well, there were no recorded alarms.    2/16/24: Clinic visit with Dana DE ANDA, HOLDEN, CWON, CFCN.  Pt denies fevers, chills, nausea, vomiting.  Left shin fluctuance to wound with hematoma and dark sanguinous drainage.  Bone fragment removed during debridement.  Obtain x-ray as residual bone palpated.  [facility].  Coccyx wound has decreased from 3 ulcers to now 1.  Right ischial wound with slough, odor.  Dakin soaked performed during visit.  Wound  debrided.  Able to continue VAC postdebridement.     2/21/2024: Clinic visit with Steve Hodges MD. Patient reports doing ok, reports feeling well. Left medial lower extremity wound is measuring larger with thick slough and friable tissue. Xray completed today, will undoubtedly demonstrate known OM. Patient denies any issues with wound VAC. Home health has been soaking Ischial wound with Dakins prior to applying wound VAC.    2/28/2024: Clinic visit with Steve Hodges MD. Patient recently tested positive for COVID. Reports some congestion, cough, and brain fog. Denies other symptoms. Patient with new wounds to left lower extremity undoubtedly associated with known underlying OM. Patient's sacral and ischial wound appear to be improving, measuring smaller.    3/6/2024: Clinic visit with Steve Hodges MD. Patient reports feeling much better, COVID symptoms have resolved. Patient's left lower extremity wounds are stable, posterior leg wounds appear resolved. Patient sacral wound is stable and ischial wound is improving.    3/13/2024 : Clinic visit with ADILSON Arthur, FNP-BC, CWOCN, CFCN.   Patient states he is feeling well, offers no complaints.  Left lower extremity wounds continue to wax and wane.  Sacral and ischial wounds slowly progressing.    3/20/2024: Clinic visit with Steve Hodges MD. Patient reports doing ok. Denies any acute issues. Leg wounds continue to wax and wane. He reports there was more slough and increased odor from ischial wound so home health packed with dakins prior to applying VAC. No odor today in clinic.    3/27/2024: Clinic visit with Steve Hodges MD. Patient reports doing ok. Unfortunately home health did not pre-drape bridging the trac pad from wound VAC and right buttocks / hip skin is irritated and at risk of breaking down. Left leg wounds have improved. Sacral and ischial pressure injuries are stable.    4/3/2024: Clinic visit with Steve Hodges MD. Patient reports  doing well, denies any acute issues. Leg wounds are all improving, few new pinpoint areas of skin breakdown. Sacral and ischial pressure injuries slightly improved. Skin on right buttocks and hip has improved and did not fully breakdown.    4/10/2024 : Clinic visit with ADILSON Arthur, HOLDEN, IMAN, LILIYA.   Patient continues to feel well.  Today, all of his left lower extremity wounds are healed, though historically have tended to open and close.  Sacral and ischial wounds both measure bit larger today.    4/17/2024 : Clinic visit with ADILSON Arthur, HOLDEN, LILIYA VALERIO.   Anjum states he is feeling very well.  Left lower extremity wounds remain closed.  Sacral/coccyx ulcers and right ischial ulcer continue to wax and wane.      4/24/2024 : Clinic visit with ADILSON Arthur, HOLDEN, LILIYA VALERIO.   Anjum states he is feeling well.  He is left medial lower leg wound, and left heel wounds have reopened slightly, neither of which required excisional debridement today.  Sacral and ischial wounds are slowly progressing.   He has not yet obtained quarter strength Dakin's for soaks with VAC dressing change.  He decided to order from [facility].  However, I also sent an order to [facility] last week.    5/1/2024 : Clinic visit with ADILSON Arthur, HOLDEN, IMAN, LILIYA.   Anjum is not feeling well, very fatigued.  He is again COVID-positive, was in the ED 2 nights ago with fever and chills.  Chest x-ray showed no evidence of pneumonia.  A CT of his abdomen and pelvis was also done, showed apparent bone resorption of the ischial tuberosity, suggesting osteomyelitis-this is not a new finding.  Patient was treated for ischial OM in December 2023 and January of this year.  He was discharged home on cefdinir.   Today he is afebrile, but states he has been extremely fatigued.  He admits to sitting in his wheelchair more than usual.  He does have a new pressure injury to his left ischium, treatment initiated in  "clinic today.    5/8/2024: Clinic visit with Steve Hodges MD. Patient reports feeling much better, reports has recovered from COVID and feeling in normal state of health. His sacral wound has resolved. Right ischial wound measuring smaller. Left ischial wound largely unchanged with thick adherent slough.    5/15/2024: Clinic visit with Steve Hodges MD. Patient reports feeling in normal state of health. He received a letter from medicare denying appeal to continue wound VAC. Clinic staff looking into this as he has been making progress with VAC use, appears that order to continue NPWT was sent to different facility. I have signed order to continue NPWT. Patient continues to develop new pressure injuries and worsening of left ischial wound. He has a roho wheelchair cushion, but with new wounds this may be inadequate. Will order new seating eval from [facility].    5/22/2024 : Clinic visit with ADILSON Arthur, HOLDEN, IMAN, LILIYA.   Patient states he is feeling well overall.  His left ischial ulcer has deteriorated, increased necrotic tissue.  He states that he has been sitting more than usual while a friend of his was visiting from out of town.  He has heard from [facility], and there will be someone coming to his home to do a seating eval in the next few weeks.      5/29/2024 : Clinic visit with ADILSON Arthur, HOLDEN, IMAN, CFTRACI.   Turk presents today with significant deterioration of his ischial wounds.  The left wound in particular is markedly worse with increased slough and odor.  States he has not been spending more time up in his wheelchair than usual.  He has not heard from [employer] yet regarding seating eval.  I contacted Pinky Gary at [facility] to find out when his seating eval would be completed.  Was informed that someone would be up to see him next week.   Patient states he cannot have MRI because he has, \"ferrous metal\" in his body, placed in the 1970s.  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  Will start with x-ray of pelvis, will have [facility] do this in his living facility.   In the meantime, I counseled patient to minimize time up in wheelchair, and to make sure that while in bed he is to make sure that pelvis is tilted when head of bed is above 30 degrees.      REVIEW OF SYSTEMS:   Unchanged from previous wound clinic assessment on 5/22/2024, except as noted in interval history above.         PHYSICAL EXAMINATION:   /80   Pulse 82   Temp 37.2 °C (99 °F) (Temporal)   Resp 18   SpO2 94%   Physical Exam  Constitutional:       Appearance: He is obese.   Cardiovascular:      Rate and Rhythm: Normal rate.   Pulmonary:      Effort: Pulmonary effort is normal.   Abdominal:      Comments: Colostomy left lower quadrant   Genitourinary:     Comments: Suprapubic catheter to down drain   Skin:     Comments: Stage IV coccygeal pressure injury -remains resolved    Stage IV pressure injury to right ischium-wound measures slightly smaller, depth of wound bed and undermining about the same.  No areas of granulation.  Moderate serosanguineous drainage.  Odor noted.  No periwound erythema or induration    Full-thickness wound to left medial lower leg, surgical wound dehiscence-area about the same, poor tissue quality, fragile epithelium to periwound.  Scant drainage    Stage III pressure injury left posterior heel -remains resolved, covered with fragile epithelium    Stage III pressure injury to posterior left lower leg- remains resolved with area of discoloration, skin intact    Stage III pressure injury of left ischium-wound has deteriorated significantly, area has increased.  Thick, loose slough to wound bed.  Moderate to heavy serosanguineous drainage, odor noted.   No periwound erythema or induration.     Neurological:      Mental Status: He is alert and oriented to person, place, and time.   Psychiatric:         Mood and  Affect: Mood normal.         WOUND ASSESSMENT  Wound 12/12/23 Pressure Injury Ischium Right (Active)   Wound Image    05/22/24 1500   Site Assessment Red;[place] 05/15/24 1500   Periwound Assessment Scar tissue 05/15/24 1500   Margins Unattached edges 05/29/24 1600   Drainage Amount Moderate 05/15/24 1500   Drainage Description Serosanguineous 05/15/24 1500   Treatments Cleansed;Provider debridement 05/15/24 1500   Offloading/DME Other (comment) 03/27/24 1625   Wound Cleansing Hypochlorus Acid 05/15/24 1500   Periwound Protectant Skin Protectant Wipes to Periwound;Drape 05/15/24 1500   Dressing Changed Changed 05/15/24 1500   Dressing Cleansing/Solutions Not Applicable 05/15/24 1500   Dressing Options Wound Vac;Offloading Dressing - Sacral 05/15/24 1500   Dressing Change/Treatment Frequency Monday, Wednesday, Friday, and As Needed 05/29/24 1600   Wound Team Following Weekly 05/15/24 1500   WOUND NURSE ONLY - Pressure Injury Stage 4 05/15/24 1500   Non-staged Wound Description Not applicable 05/08/24 1500   Wound Length (cm) 3.1 cm 05/22/24 1500   Wound Width (cm) 3.1 cm 05/22/24 1500   Wound Depth (cm) 1.6 cm 05/22/24 1500   Wound Surface Area (cm^2) 9.61 cm^2 05/22/24 1500   Wound Volume (cm^3) 15.376 cm^3 05/22/24 1500   Post-Procedure Length (cm) 3.2 cm 05/22/24 1500   Post-Procedure Width (cm) 3.2 cm 05/22/24 1500   Post-Procedure Depth (cm) 1.7 cm 05/22/24 1500   Post-Procedure Surface Area (cm^2) 10.24 cm^2 05/22/24 1500   Post-Procedure Volume (cm^3) 17.408 cm^3 05/22/24 1500   Wound Healing % 73 05/22/24 1500   Wound Bed Granulation (%) 100 % 03/06/24 1000   Tunneling (cm) 0 cm 05/22/24 1500   Tunneling Clock Position of Wound 2 03/06/24 1000   Undermining (cm) 3.5 cm 05/22/24 1500   Undermining of Wound, 1st Location From 5 o'clock;To 2 o'clock 05/22/24 1500   Undermining (cm) - 2nd location 2.5 cm 02/16/24 1620   Undermining of Wound, 2nd Location From 7 o'clock;To 11 o'clock 02/16/24 1620   Wound Odor None  05/22/24 1500   Exposed Structures None 05/22/24 1500   Number of days: 169       Wound 01/31/24 Pressure Injury Coccyx Inferior wound, previosly closed but reopened 1/31/24 (Active)   Wound Image   05/22/24 1500   Site Assessment Epithelialization 05/22/24 1500   Periwound Assessment Scar tissue 05/22/24 1500   Margins Attached edges 05/29/24 1600   Drainage Amount None 05/22/24 1500   Drainage Description Serosanguineous 05/01/24 1600   Treatments Cleansed 05/22/24 1500   Wound Cleansing Normal Saline Irrigation 05/22/24 1500   Periwound Protectant Barrier Paste 05/22/24 1500   Dressing Cleansing/Solutions Not Applicable 05/22/24 1500   Dressing Options Hydrofiber Silver;Offloading Dressing - Sacral 05/22/24 1500   Dressing Change/Treatment Frequency Monday, Wednesday, Friday, and As Needed 05/08/24 1500   Wound Team Following Weekly 05/22/24 1500   WOUND NURSE ONLY - Pressure Injury Stage 3 05/22/24 1500   Wound Length (cm) 0 cm 05/22/24 1500   Wound Width (cm) 0 cm 05/22/24 1500   Wound Depth (cm) 0 cm 05/22/24 1500   Wound Surface Area (cm^2) 0 cm^2 05/22/24 1500   Wound Volume (cm^3) 0 cm^3 05/22/24 1500   Post-Procedure Length (cm) 0.8 cm 04/24/24 1530   Post-Procedure Width (cm) 1 cm 04/24/24 1530   Post-Procedure Depth (cm) 0.4 cm 04/24/24 1530   Post-Procedure Surface Area (cm^2) 0.8 cm^2 04/24/24 1530   Post-Procedure Volume (cm^3) 0.32 cm^3 04/24/24 1530   Wound Healing % 100 05/22/24 1500   Wound Bed Slough (%) 30 % 03/06/24 1000   Tunneling (cm) 0 cm 05/22/24 1500   Undermining (cm) 0 cm 05/22/24 1500   Undermining of Wound, 1st Location From 11 o'clock;To 1 o'clock 03/13/24 1545   Wound Odor None 05/22/24 1500   Exposed Structures None 05/22/24 1500   Number of days: 119       Wound 02/16/24 Full Thickness Wound Leg Medial Left (Active)   Wound Image   05/22/24 1500   Site Assessment Red;Other (Comment) 05/22/24 1500   Periwound Assessment Hyperpigmented;Edema;Fragile 05/22/24 1500   Margins Attached  edges 05/29/24 1600   Drainage Amount Small 05/22/24 1500   Drainage Description Serosanguineous 05/22/24 1500   Treatments Cleansed 05/22/24 1500   Wound Cleansing Hypochlorus Acid 05/22/24 1500   Periwound Protectant Skin Protectant Wipes to Periwound 05/22/24 1500   Dressing Cleansing/Solutions Not Applicable 05/22/24 1500   Dressing Options [place];Offloading Dressing - Sacral;Tubigrip;Other (Comments) 05/22/24 1500   Dressing Change/Treatment Frequency Monday, Wednesday, Friday, and As Needed 05/29/24 1600   Wound Team Following Weekly 05/22/24 1500   Non-staged Wound Description Full thickness 05/29/24 1600   Wound Length (cm) 0.6 cm 05/22/24 1500   Wound Width (cm) 0.5 cm 05/22/24 1500   Wound Depth (cm) 0.4 cm 05/22/24 1500   Wound Surface Area (cm^2) 0.3 cm^2 05/22/24 1500   Wound Volume (cm^3) 0.12 cm^3 05/22/24 1500   Post-Procedure Length (cm) 0.3 cm 04/24/24 1530   Post-Procedure Width (cm) 0.3 cm 04/24/24 1530   Post-Procedure Depth (cm) 0.1 cm 04/24/24 1530   Post-Procedure Surface Area (cm^2) 0.09 cm^2 04/24/24 1530   Post-Procedure Volume (cm^3) 0.009 cm^3 04/24/24 1530   Wound Healing % 95 05/22/24 1500   Tunneling (cm) 0 cm 05/22/24 1500   Undermining (cm) 0 cm 05/22/24 1500   Wound Odor None 05/22/24 1500   Exposed Structures None 05/22/24 1500   Number of days: 103       Wound 05/01/24 Pressure Injury Ischium Left (Active)   Wound Image    05/29/24 1600   Site Assessment Yellow;Grey;Pink;Slough 05/29/24 1600   Periwound Assessment Blanchable erythema;Intact 05/29/24 1600   Margins Unattached edges 05/29/24 1600   Closure Secondary intention 05/08/24 1500   Drainage Amount Moderate 05/29/24 1600   Drainage Description Serosanguineous 05/29/24 1600   Treatments Cleansed;Provider debridement 05/29/24 1600   Wound Cleansing Dakin's Solution 05/29/24 1600   Periwound Protectant Barrier Paste 05/29/24 1600   Dressing Status Intact 05/01/24 1600   Dressing Changed New 05/29/24 1600   Dressing  "Cleansing/Solutions Other (Comments) 05/29/24 1600   Dressing Options Hydrofiber Silver;Offloading Dressing - Sacral;Other (Comments) 05/29/24 1600   Dressing Change/Treatment Frequency Monday, Wednesday, Friday, and As Needed 05/29/24 1600   Wound Team Following Weekly 05/29/24 1600   WOUND NURSE ONLY - Pressure Injury Stage 4 05/29/24 1600   Non-staged Wound Description Full thickness 05/29/24 1600   Wound Length (cm) 6 cm 05/29/24 1600   Wound Width (cm) 3.8 cm 05/29/24 1600   Wound Depth (cm) 3.3 cm 05/29/24 1600   Wound Surface Area (cm^2) 22.8 cm^2 05/29/24 1600   Wound Volume (cm^3) 75.24 cm^3 05/29/24 1600   Post-Procedure Length (cm) 4 cm 05/22/24 1500   Post-Procedure Width (cm) 3.6 cm 05/22/24 1500   Post-Procedure Depth (cm) 1.6 cm 05/22/24 1500   Post-Procedure Surface Area (cm^2) 14.4 cm^2 05/22/24 1500   Post-Procedure Volume (cm^3) 23.04 cm^3 05/22/24 1500   Wound Healing % -[id number] 05/29/24 1600   Tunneling (cm) 0 cm 05/22/24 1500   Undermining (cm) 0 cm 05/22/24 1500   Wound Odor Mild;Foul 05/22/24 1500   Exposed Structures KEN 05/22/24 1500   Number of days: 28       PROCEDURE: Excisional debridement of right ischial ulcer and left ischial ulcer  -Curette used to debride wound beds. Excisional debridement was performed to remove devitalized tissue until healthy, bleeding tissue was visualized.  Total area debrided 40.0 cm², including into undermining.  Tissue debrided into the muscle / fascia layer  -Bleeding controlled with manual pressure.    -Wound care completed by wound RN, refer to flowsheet  -Patient tolerated the procedure well, without c/o pain or discomfort.        Pertinent Labs and Diagnostics:    Labs:     A1c: No results found for: \"HBA1C\"     [facility] results, 7/1/2022 (under media tab)    CRP 13    ESR 31      IMAGING:     X-ray left tib-fib ordered 2/16/2024 through [facility]    12/11/2023-CT of abdomen pelvis with contrast  IMPRESSION:   1.  Right ischial decubitus ulcer " extending to bone with soft tissue gas tracking along the right perineum. Appearance suggesting osteomyelitis, consider component of necrotizing fasciitis as clinically appropriate.  2.  Small pericardial effusion  3.  Left adrenal nodule, density on prior noncontrast CT demonstrates adenoma.  4.  Hepatomegaly  5.  Enlarged prostate, workup and evaluation for causes of prostate enlargement recommended as clinically appropriate.  6.  Atherosclerosis and atherosclerotic coronary artery disease    12/15/2023-bone scan of left foot  IMPRESSION:     1.  Mild increased activity in the LEFT 1st and 3rd toes on blood pool and delayed images possibly indicating inflammation/infection.  2.  No significant blood flow asymmetry.          VASCULAR STUDIES: No results found.    LAST  WOUND CULTURE:   Lab Results   Component Value Date/Time    CULTRSULT  04/29/2024 08:01 PM     3 or more organisms isolated, culture of doubtful  significance, please recollect.  Mixed enteric ade >100,000 cfu/mL        PATHOLOGY  2/17/2023-bone fragment extracted from left lower extremity wound\\  FINAL DIAGNOSIS:     A. Left leg bone fragment at base of chronic wound:          Extensively degenerated fibrocartilaginous tissue with a rim of           fibrinopurulent debris          Correlate with culture findings            Comment: While no residual intact bone is identified, these           findings are suggestive of adjacent osteomyelitis.      ASSESSMENT AND PLAN:     1. Sacral decubitus ulcer, stage IV ([facility])  Comments: Ulcer first noted in early April 2022 as small open area which quickly enlarged.  This ulcer is present distal from previous sacral ulcer which healed after surgery in January.  Patient has history of flap reconstruction x2 to this area.    5/29/2024: Remains resolved.  High risk for recurrence  -Patient to return to clinic weekly for assessment and debridement  -Patient does spend a lot of time up in his wheelchair, and wishes  to continue to do so for his quality of life.  He lives independently, drives, and is involved with family activities.  He does have a roho wheelchair cushion, suspect may be inadequate. Will refer for repeat seating evaluation.  - Known OM that was previously treated. CT scan done during recent hospitalization did not show OM of sacrum, however OM of the ischium noted.  -Monitor site each clinic visit    Wound care: Silicone foam pressure relieving dressing    2.  Pressure injury of right ischium, stage IV  Comments: Abscess and OM found on CT during hospitalization in December 2023.  Patient underwent I&D with VAC placement.  IV antibiotics through 1/22/2024.    5/29/2024: Wound area has decreased, depth of wound bed and undermining about the same.  - Excisional debridement of nonviable tissue from wound in clinic today, medically necessary to promote wound healing   -Home health has been instructed to continue Dakins soaked gauze to wound for  15 minutes prior to placement of new VAC dressing as needed for odor / slough.  -Continue wound VAC to right ischial wound  -Patient to return to clinic weekly for assessment and debridement  -Patient states that he is up in his wheelchair for 8 to 10 hours/day, repositions frequently, and is using capabilities of his wheelchair to reposition back and seat  -Home health to change dressing 2 times per week in between clinic visits.  Dakin's soaks for 15 minutes each dressing change  -Patient is very well versed on pressure relief measures, has adequate surface on bed, alternating low air loss.  -Seating eval ordered through [employer] on previous visit.  I contacted Pinky at [facility] [place] to inquire as to when seating eval will be done.  She informing that someone will be out to see him next week.    Wound care:  NPWT at -125 mmHg to accelerate granulation, change 3 times per week, sacral offloading foam.    3. Postoperative wound dehiscence, subsequent encounter  4.  Osteomyelitis of left lower extremity  Comments: On 4/26/2022 patient underwent irrigation and debridement of multiple compartments of the left lower extremity states for pressure injury, with bone excision, and complex closure of chronic wound using biologic skin substitute.  During postop visit in surgeons office on 5/11, surgical site was noted to be dehisced.      5/29/2024: Small open wound with friable tissue.  No significant change past week.  Wound has waxed and waned over the course of treatment.  Known osteomyelitis.  Patient has declined amputation  -No excisional debridement of these wounds required today  -Patient to be mindful of offloading when supine, should assure that his leg is not rotating medially    Wound care: Silicone foam dressing, Tubigrip D    5. Deep tissue injury  6. Pressure injury of left foot, stage IV  Comments: DTI to posterior left lower leg first observed in clinic 7/29/2022.  Patient does not know how it started, had been wearing heel float boot consistently.  Evolved into stage IV pressure injury    5/29/2024: Both wounds are resolved  -High risk for recurrence, has opened and closed several times before  -Monitor site each clinic visit     Care: Open to air    7. Pressure injury of left heel, stage 3 ([facility])  Comments: First noted in clinic on 10/21/2022, originally noted to be stage II    5/29/2024: Remains resolved.  -High risk for recurrence, monitor each clinic visit  -Prevalon boot to alleviate pressure    Wound care: Silicone heel offloading dressing    8.  Pressure injury of left leg, stage III    9484: Remains resolved  -Wound is closed, however historically has closed and recurred multiple times  -Monitor each clinic visit   Wound Care: Open to air, Tubigrip to manage edema    9.  Pressure injury of left ischium, stage IV    5/29/2024: Significant deterioration over the past week.  Area has increased, more nonviable tissue present, no odor noted  -Excisional debridement  of wound in clinic today, medically necessary to promote wound healing.  -Patient to return to clinic weekly for assessment and debridement  -[facility] of [facility] through [facility], be done in patient's home  -Rx for Augmentin sent to patient's pharmacy  -Will plan to start wound VAC as soon as wound bed adequately prepared  -Home health to change dressing 2 times per week in between clinic visits.  Instructed to apply Dakin's moistened gauze to wound bed for 15 minutes prior to application of dressing  -Consider culturing wound next visit if no improvement    Wound care: Puracyn moistened silver Hydrofiber, offloading sacral dressing    10. Quadriplegia, C5-C7, incomplete ([facility])  Comments: Complicating factor.  Impaired mobility and sensation  -Patient is still spending 7-8 hours/day up in his wheelchair, he has Roho cushion nearly 5 years old, and knows to reposition frequently.  Wears heel float boots bilaterally at all times  Mobility Evaluation:  -Will refer patient to [facility] for Mobility Evaluation with chief complaint of mobility limitations that interfere with daily living activities. Patient would benefit from repeat seating evaluation and custom seat cushion as he continues to develop pressure injuries to b/l ischium which is likely secondary to inadequate seat cushion. I strongly recommend evaluation for a custom seat cushion to limit risk of pressure injuries.  -Diagnosis that limits mobility: Incomplete quadriplegia who is wheelchair dependent.    -Type of mobility device prescribed: Seating evaluation and custom seat cushion.    My total time spent caring for the patient on the day of the encounter was 30 minutes, reviewing history, assessment, counseling and education, and coordination of care.  This does not include time spent on separately billable procedures/tests.        Please note that this note may have been created using voice recognition software. I have worked with technical  experts from [facility] to optimize the interface.  I have made every reasonable attempt to correct obvious errors, but there may be errors of grammar and possibly content that I did not discover before finalizing the note.

## 2024-06-03 ENCOUNTER — TELEPHONE (OUTPATIENT)
Dept: WOUND CARE | Facility: MEDICAL CENTER | Age: 74
End: 2024-06-03
Payer: MEDICARE

## 2024-06-03 NOTE — TELEPHONE ENCOUNTER
Patient called with concerns that the antibiotic ordered by ADILSON Mendoza was not at his pharmacy. Pharmacist staff stated that they had not received an order.     Confirmed that order had been sent to [facility]. Patient stated that this is not his preferred pharmacy and that he requested the medication be sent to [facility].     Per patient request, Pharmacy preferred list updated while patient was on the phone. ADILSON Shaikh sent updated order to preferred pharmacy. Patient to follow-up with Pharmacy for pick-up.

## 2024-06-04 ENCOUNTER — TELEPHONE (OUTPATIENT)
Dept: WOUND CARE | Facility: MEDICAL CENTER | Age: 74
End: 2024-06-04
Payer: MEDICARE

## 2024-06-04 NOTE — TELEPHONE ENCOUNTER
Received phone call from ZAIRA Solorzano with [facility]  who stated that patients wound was leaking a lot more, she has beed out more frequently for dressing changes and the x-ray has not come through to get the wound vac on. Per notes, patients abx had to be sent to a different pharmacy yesterday. Phone call to [facility] who stated that they needed an additional diagnoses to proceed with order; diagnoses provided. [facility] stated that they should be able to obtain X-ray today or tomorrow.    This RN returned phone call to ZAIRA Solorzano and left a voicemail stating the above information. This RN informed ZAIRA Solorzano that if she had increasing concerns regarding worsening infection, to send the patient to the ER.

## 2024-06-05 ENCOUNTER — OFFICE VISIT (OUTPATIENT)
Dept: WOUND CARE | Facility: MEDICAL CENTER | Age: 74
End: 2024-06-05
Payer: MEDICARE

## 2024-06-05 ENCOUNTER — HOSPITAL ENCOUNTER (OUTPATIENT)
Facility: MEDICAL CENTER | Age: 74
End: 2024-06-05
Attending: STUDENT IN AN ORGANIZED HEALTH CARE EDUCATION/TRAINING PROGRAM
Payer: MEDICARE

## 2024-06-05 VITALS
OXYGEN SATURATION: 96 % | HEART RATE: 101 BPM | TEMPERATURE: 98.6 F | DIASTOLIC BLOOD PRESSURE: 74 MMHG | RESPIRATION RATE: 18 BRPM | SYSTOLIC BLOOD PRESSURE: 128 MMHG

## 2024-06-05 DIAGNOSIS — L89.894 PRESSURE INJURY OF LEFT FOOT, STAGE 4 (HCC): ICD-10-CM

## 2024-06-05 DIAGNOSIS — T14.8XXA INFECTED WOUND: ICD-10-CM

## 2024-06-05 DIAGNOSIS — L89.154 SACRAL DECUBITUS ULCER, STAGE IV (HCC): ICD-10-CM

## 2024-06-05 DIAGNOSIS — M86.9 OSTEOMYELITIS OF LEFT LOWER EXTREMITY (HCC): ICD-10-CM

## 2024-06-05 DIAGNOSIS — L89.893 PRESSURE INJURY OF LEFT LEG, STAGE 3 (HCC): ICD-10-CM

## 2024-06-05 DIAGNOSIS — M86.18 OTHER ACUTE OSTEOMYELITIS, OTHER SITE (HCC): ICD-10-CM

## 2024-06-05 DIAGNOSIS — L89.324 PRESSURE INJURY OF LEFT ISCHIUM, STAGE 4 (HCC): Primary | ICD-10-CM

## 2024-06-05 DIAGNOSIS — L89.314 PRESSURE INJURY OF RIGHT ISCHIUM, STAGE 4 (HCC): ICD-10-CM

## 2024-06-05 DIAGNOSIS — L08.9 INFECTED WOUND: ICD-10-CM

## 2024-06-05 DIAGNOSIS — L89.623 PRESSURE INJURY OF LEFT HEEL, STAGE 3 (HCC): ICD-10-CM

## 2024-06-05 DIAGNOSIS — T81.31XD POSTOPERATIVE WOUND DEHISCENCE, SUBSEQUENT ENCOUNTER: ICD-10-CM

## 2024-06-05 DIAGNOSIS — G82.54 QUADRIPLEGIA, C5-C7 INCOMPLETE (HCC): ICD-10-CM

## 2024-06-05 DIAGNOSIS — T14.8XXA DEEP TISSUE INJURY: ICD-10-CM

## 2024-06-05 PROCEDURE — 99214 OFFICE O/P EST MOD 30 MIN: CPT

## 2024-06-05 PROCEDURE — 11047 DBRDMT BONE EACH ADDL: CPT | Performed by: STUDENT IN AN ORGANIZED HEALTH CARE EDUCATION/TRAINING PROGRAM

## 2024-06-05 PROCEDURE — 88311 DECALCIFY TISSUE: CPT

## 2024-06-05 PROCEDURE — 87205 SMEAR GRAM STAIN: CPT | Mod: 91

## 2024-06-05 PROCEDURE — 11047 DBRDMT BONE EACH ADDL: CPT

## 2024-06-05 PROCEDURE — 87077 CULTURE AEROBIC IDENTIFY: CPT | Mod: 91

## 2024-06-05 PROCEDURE — 88304 TISSUE EXAM BY PATHOLOGIST: CPT

## 2024-06-05 PROCEDURE — 3078F DIAST BP <80 MM HG: CPT | Performed by: STUDENT IN AN ORGANIZED HEALTH CARE EDUCATION/TRAINING PROGRAM

## 2024-06-05 PROCEDURE — 3074F SYST BP LT 130 MM HG: CPT | Performed by: STUDENT IN AN ORGANIZED HEALTH CARE EDUCATION/TRAINING PROGRAM

## 2024-06-05 PROCEDURE — 87186 SC STD MICRODIL/AGAR DIL: CPT

## 2024-06-05 PROCEDURE — 99214 OFFICE O/P EST MOD 30 MIN: CPT | Mod: 25 | Performed by: STUDENT IN AN ORGANIZED HEALTH CARE EDUCATION/TRAINING PROGRAM

## 2024-06-05 PROCEDURE — 97605 NEG PRS WND THER DME<=50SQCM: CPT

## 2024-06-05 PROCEDURE — 87205 SMEAR GRAM STAIN: CPT

## 2024-06-05 PROCEDURE — 87015 SPECIMEN INFECT AGNT CONCNTJ: CPT

## 2024-06-05 PROCEDURE — 87070 CULTURE OTHR SPECIMN AEROBIC: CPT

## 2024-06-05 PROCEDURE — 11044 DBRDMT BONE 1ST 20 SQ CM/<: CPT

## 2024-06-05 PROCEDURE — 11043 DBRDMT MUSC&/FSCA 1ST 20/<: CPT | Performed by: STUDENT IN AN ORGANIZED HEALTH CARE EDUCATION/TRAINING PROGRAM

## 2024-06-05 PROCEDURE — 11044 DBRDMT BONE 1ST 20 SQ CM/<: CPT | Mod: 59 | Performed by: STUDENT IN AN ORGANIZED HEALTH CARE EDUCATION/TRAINING PROGRAM

## 2024-06-05 PROCEDURE — 87070 CULTURE OTHR SPECIMN AEROBIC: CPT | Mod: 91

## 2024-06-05 PROCEDURE — 11043 DBRDMT MUSC&/FSCA 1ST 20/<: CPT

## 2024-06-05 NOTE — PATIENT INSTRUCTIONS
-Keep your wound dressing clean, dry, and intact. Only change dressing if it's over saturated, soiled or falls off.     -Wound vac may not have any drainage in tube or cannister & it will still be working.   Change cannister if it does become full by pressing tab on side of machine to remove canister and snap on new one. Full canister can be thrown in the trash. If cannister fills with bright red blood - go to ER. Dressing will be changed every MWF at the wound clini.  If you are having issues with your wound VAC, please consider patching leaks, changing the canister, or calling [phone] for troubleshooting. If the wound VAC has been off or un-operational for over 2 hours, call wound care center to inform them and remove all dressings including black foam and replace with normal saline damp gauze.       -Should you experience any significant changes in your wound(s), such as infection (redness, swelling, localized heat, increased pain, fever > 101 F, chills) or have any questions regarding your home care instructions, please contact the wound center at [phone]. If after hours, contact your primary care physician or go to the hospital emergency room.

## 2024-06-05 NOTE — PROGRESS NOTES
Deep wound culture obtained by Dr. Hodges and sent to lab. Bone specimen obtained by Dr. Hodges and sent for culture and pathology. Both specimens sent via tube system.

## 2024-06-05 NOTE — PROGRESS NOTES
Provider Encounter- Pressure Injury        HISTORY OF PRESENT ILLNESS  Wound History:    START OF CARE IN CLINIC: 1/31/2024 (return to clinic after hospitalization)    REFERRING PROVIDER: Racquel York       WOUNDS-superior coccyx pressure injury, stage IV                                 Coccyx pressure injury, stage IV           Inferior coccyx pressure injury, stage IV                                 Right ischial pressure injury, stage IV                                 Left heel pressure injury, recurring stage III                                 Left posterior lower leg/calf-stage III                                 Left medial lower leg surgical wound dehiscence-resolved, reopens frequently            Left ischial pressure injury, stage IV-first observed in clinic 5/1/2024          HISTORY: Patient with history of incomplete quadriplegia referred to [facility] for treatment of a stage IV pressure injury.  He has a history of previous pressure injuries to this area, and underwent muscle flaps in 2019, and then again in 2020.  He was seen in the wound clinic in November 2021 for an ulcer proximal from his current ulcer, and pressure injuries to his left posterior lower leg and left heel.  At that time, it was discovered that the patient had retained VAC foam embedded in the wound bed of the sacral wound.  Attempts were made to get him back to his plastic surgeon, though unsuccessful.  In January he underwent surgical removal of VAC sponge along with excisional debridement of his sacral wound by Dr. Chaves.  After the surgery, his wound went on to heal without incident.   In early April 2022, his home health nurse noted a new sacral ulcer, below the previous ulcer which quickly tripled in size over the following weeks.  The ulcer to his left medial lower leg had also deteriorated, with bone visible at the base..  He was hospitalized from 4/22 until 4/27/2022 and underwent surgery with Dr. Raman on 4/26 for  irrigation and debridement of multiple compartments of the left lower extremity, bone excision, and complex closure of chronic wound using biologic skin substitute.   His sacrococcygeal wound was not surgically addressed during this admission.  He was discharged back to his group home, with home health, and referral to outpatient wound clinic for his sacral wound.  He was instructed to follow-up with his surgeon for his lower leg wound.       Postoperatively, the left medial lower leg incision dehisced.  He was seen by his surgeon at [facility] on 5/11.  The surgeon opted to leave remaining sutures in place, and refer him to the wound clinic for treatment of this wound.   Treatment of this wound was initiated in clinic on 5/12.  During this visit was also noted that his heel DTI had resolved, but that he had a new pressure injury to his left posterior lower extremity.     A new pressure injury was noted to patient's right upper buttock/lower back on 5/20/2022.  Wound was linear in shape, skin discolored but intact.     Abrasion noted to left anterior lower leg.  First observed in clinic on 7/22/2022.  Patient states he bumped his leg into his food tray.     Small DTI noted to patient's left lateral lower leg on 7/29/2022.  Skin intact but discolored.     Large area of deep tissue injury noted to patient's left exterior lower leg.  Patient denied any trauma to this area.  Skin intact.  Wound documented.    1/27/2023: Patient was admitted to [facility] [place] [place] from 1/23-1/25/2023 with gross hematuria. He underwent RICHARD which showed watchman device was in place and he was taken off of Xarelto. While hospitalized wound team was consulted. He was referred back to [facility] and home health upon discharge.    Patient was hospitalized at [facility] for pyelonephritis from 2/26 until 3/2/2023, admitted for fever and general malaise.  He was admitted and initially started on linezolid and meropenem for suspected UTI and history of multidrug-resistant  organisms.  Urine cultures were negative. ID was consulted, recommended CT of chest and abdomen,which were negative for acute findings. However, he was treated with 5 days total course of antibiotics for suspected UTI, and symptoms completely resolved.  During this admission, the inpatient wound team was consulted for treatment of his sacral and lower leg wounds.  A wound culture was taken from his left heel pressure ulcer, negative.  Once stabilized, he was discharged home and referred back to [facility] to resume treatment of his wounds.    Patient was hospitalized at [facility] from 12/11 until 12/23/2023, admitted for fever.  Wound infection suspected.  CT scan of abdomen and pelvis for evaluation of sacral pressure injury showed gas tracking down to the bone consistent with osteomyelitis.  He underwent I&D of right ischial ulcer (documented as buttock) with Dr. Bansal, medial tract leading to an abscess was identified.  Cavity was opened allowing it to drain into the main wound bed.  Wound VAC was placed and managed by wound team during this admission.  A bone scan of patient's left foot was also done, initially concerning for osteomyelitis.  Orthopedic surgery was consulted and did not recommend surgical intervention. ID consulted also, recommended the patient to receive IV ertapenem 1 g every 24 hours plus IV daptomycin 8 mg/kg every 24 hours through 1/22/2024.   He was discharged to LTAC on 1/23 for IV antibiotics and wound care.  From the LTAC he was discharged home on 1/22 with home health and referral back to [facility] to resume management of his wounds  .      Pertinent Medical History: Incomplete quadriplegia, history of stage IV pressure injuries, history of flap procedures to sacral pressure injuries, osteomyelitis, obesity, colostomy in place   Contributing factors: Immobility and Obesity, impaired sensation    Personal support: Attendant-staff at halfway and home health nursing    TOBACCO USE:   Former smoker, quit  in 1977.  Never used smokeless tobacco    Patient's problem list, allergies, and current medications reviewed and updated in Epic    Interval History:  Interval History thinned 7/29/2022.  Please see previous notes for complete interval history.   Interval History thinned 1/27/2023. Please see previous note for complete interval history.  Interval History thinned 3/3/2023.  Please see previous notes for complete interval history.    Interval History thinned 8/4/2023.  Please see previous notes for complete interval history.    Interval History thinned 1/31/2024.  Please see previous notes for complete interval history.      1/31/2024 Initial clinic visit with ADILSON Arthur, HOLDEN, CWDINESHN, CFCN.   Patient returns to clinic after hospitalization and LTAC stay.  VAC in place to right ischial wound.  Sacral wounds have progressed significantly since he was last seen in clinic.  Left medial wound has healed, though covered with friable tissue.  Left heel ulcer, previously resolved has reopened slightly.  He states that he is feeling well overall, denies fevers, chills, nausea, vomiting, cough or shortness of breath.     2/7/2024: Clinic visit with Steve Hodges MD. Patient reports feeling in normal state of health. Patient denies any alarms from wound VAC, however today he presented with significant odor from ischial wound and dressing was partially avulsed leaving in adequate suction. Machine was checked and functioning well, there were no recorded alarms.    2/16/24: Clinic visit with Dana DE ANDA, HOLDEN, CWON, CFCN.  Pt denies fevers, chills, nausea, vomiting.  Left shin fluctuance to wound with hematoma and dark sanguinous drainage.  Bone fragment removed during debridement.  Obtain x-ray as residual bone palpated.  [facility] through [facility].  Coccyx wound has decreased from 3 ulcers to now 1.  Right ischial wound with slough, odor.  Dakin soaked performed during visit.  Wound  debrided.  Able to continue VAC postdebridement.     2/21/2024: Clinic visit with Steve Hodges MD. Patient reports doing ok, reports feeling well. Left medial lower extremity wound is measuring larger with thick slough and friable tissue. Xray completed today, will undoubtedly demonstrate known OM. Patient denies any issues with wound VAC. Home health has been soaking Ischial wound with Dakins prior to applying wound VAC.    2/28/2024: Clinic visit with Steve Hodges MD. Patient recently tested positive for COVID. Reports some congestion, cough, and brain fog. Denies other symptoms. Patient with new wounds to left lower extremity undoubtedly associated with known underlying OM. Patient's sacral and ischial wound appear to be improving, measuring smaller.    3/6/2024: Clinic visit with Steve Hodges MD. Patient reports feeling much better, COVID symptoms have resolved. Patient's left lower extremity wounds are stable, posterior leg wounds appear resolved. Patient sacral wound is stable and ischial wound is improving.    3/13/2024 : Clinic visit with ADILSON Arthur, FNP-BC, CWOCN, CFCN.   Patient states he is feeling well, offers no complaints.  Left lower extremity wounds continue to wax and wane.  Sacral and ischial wounds slowly progressing.    3/20/2024: Clinic visit with Steve Hodges MD. Patient reports doing ok. Denies any acute issues. Leg wounds continue to wax and wane. He reports there was more slough and increased odor from ischial wound so home health packed with dakins prior to applying VAC. No odor today in clinic.    3/27/2024: Clinic visit with Steve Hodges MD. Patient reports doing ok. Unfortunately home health did not pre-drape bridging the trac pad from wound VAC and right buttocks / hip skin is irritated and at risk of breaking down. Left leg wounds have improved. Sacral and ischial pressure injuries are stable.    4/3/2024: Clinic visit with Steve Hodges MD. Patient reports  doing well, denies any acute issues. Leg wounds are all improving, few new pinpoint areas of skin breakdown. Sacral and ischial pressure injuries slightly improved. Skin on right buttocks and hip has improved and did not fully breakdown.    4/10/2024 : Clinic visit with ADILSON Arthur, HOLDEN, IMAN, LILIYA.   Patient continues to feel well.  Today, all of his left lower extremity wounds are healed, though historically have tended to open and close.  Sacral and ischial wounds both measure bit larger today.    4/17/2024 : Clinic visit with ADILSON Arthur, HOLDEN, LILIYA VALERIO.   Anjum states he is feeling very well.  Left lower extremity wounds remain closed.  Sacral/coccyx ulcers and right ischial ulcer continue to wax and wane.      4/24/2024 : Clinic visit with ADILSON Arthur, HOLDEN, LILIYA VALERIO.   Anjum states he is feeling well.  He is left medial lower leg wound, and left heel wounds have reopened slightly, neither of which required excisional debridement today.  Sacral and ischial wounds are slowly progressing.   He has not yet obtained quarter strength Dakin's for soaks with VAC dressing change.  He decided to order from [employer].  However, I also sent an order to [facility] last week.    5/1/2024 : Clinic visit with ADILSON Arthur, HOLDEN, IMAN, LILIYA.   Anjmu is not feeling well, very fatigued.  He is again COVID-positive, was in the ED 2 nights ago with fever and chills.  Chest x-ray showed no evidence of pneumonia.  A CT of his abdomen and pelvis was also done, showed apparent bone resorption of the ischial tuberosity, suggesting osteomyelitis-this is not a new finding.  Patient was treated for ischial OM in December 2023 and January of this year.  He was discharged home on cefdinir.   Today he is afebrile, but states he has been extremely fatigued.  He admits to sitting in his wheelchair more than usual.  He does have a new pressure injury to his left ischium, treatment initiated in  "clinic today.    5/8/2024: Clinic visit with Steve Hodges MD. Patient reports feeling much better, reports has recovered from COVID and feeling in normal state of health. His sacral wound has resolved. Right ischial wound measuring smaller. Left ischial wound largely unchanged with thick adherent slough.    5/15/2024: Clinic visit with Steve Hodges MD. Patient reports feeling in normal state of health. He received a letter from medicare denying appeal to continue wound VAC. Clinic staff looking into this as he has been making progress with VAC use, appears that order to continue NPWT was sent to different facility. I have signed order to continue NPWT. Patient continues to develop new pressure injuries and worsening of left ischial wound. He has a roho wheelchair cushion, but with new wounds this may be inadequate. Will order new seating eval from [employer]    5/22/2024 : Clinic visit with ADILSON Arthur, HOLDEN, IMAN, LILIYA.   Patient states he is feeling well overall.  His left ischial ulcer has deteriorated, increased necrotic tissue.  He states that he has been sitting more than usual while a friend of his was visiting from out of town.  He has heard from [employer], and there will be someone coming to his home to do a seating eval in the next few weeks.      5/29/2024 : Clinic visit with ADILSON Arthur, HOLDEN, IMAN, CFTRACI.   Turk presents today with significant deterioration of his ischial wounds.  The left wound in particular is markedly worse with increased slough and odor.  States he has not been spending more time up in his wheelchair than usual.  He has not heard from [employer] yet regarding seating eval.  I contacted Pinky Gary at [employer] to find out when his seating eval would be completed.  Was informed that someone would be up to see him next week.   Patient states he cannot have MRI because he has, \"ferrous metal\" in his body, placed in the 1970s.  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  Will start with x-ray of pelvis, will have [facility] do this in his living facility.   In the meantime, I counseled patient to minimize time up in wheelchair, and to make sure that while in bed he is to make sure that pelvis is tilted when head of bed is above 30 degrees.    6/5/2024: Clinic visit with Steve Hodges MD. Patient reports feeling ok, denies any fevers. He was started on Abx last week, was only able to obtain on Monday and started taking. Left ischial wound noted now to have exposed bone with few small bone fragments and some slough. He does report copious drainage from left ischial wound, home health has had to come out more frequently this week to change dressings due to saturation. With new exposed bone concern for OM, will send bone fragments for culture and path. He has Xray scheduled later today. Patient has not heard from [email or web address], I called [employer] today to see about status of seating evaluation.    REVIEW OF SYSTEMS:   Unchanged from previous wound clinic assessment on 5/29/2024, except as noted in interval history above.         PHYSICAL EXAMINATION:   /74   Pulse (!) 101   Temp 37 °C (98.6 °F)   Resp 18   SpO2 96%   Physical Exam  Constitutional:       Appearance: He is obese.   Cardiovascular:      Rate and Rhythm: Normal rate.   Pulmonary:      Effort: Pulmonary effort is normal.   Abdominal:      Comments: Colostomy left lower quadrant   Genitourinary:     Comments: Suprapubic catheter to down drain   Skin:     Comments: Stage IV coccygeal pressure injury -remains resolved    Stage IV pressure injury to right ischium- Wound largely unchanged, depth of wound bed and undermining about the same.  No areas of granulation.  Moderate serosanguineous drainage.  No odor noted.  No periwound erythema or induration    Full-thickness wound to left medial lower leg, surgical wound dehiscence-area about the  same, poor tissue quality, fragile epithelium to periwound.  Scant drainage    Stage III pressure injury left posterior heel -remains resolved, covered with fragile epithelium    Stage III pressure injury to posterior left lower leg- remains resolved with area of discoloration, skin intact    Stage IV pressure injury of left ischium- Wound significantly larger and depth has increased. There is palpable bone and few small bone fragments in wound bed. Continues to have thick adherent slough. Heavy serosanguineous drainage, odor has improved. No periwound erythema or induration.     Neurological:      Mental Status: He is alert and oriented to person, place, and time.   Psychiatric:         Mood and Affect: Mood normal.         WOUND ASSESSMENT  Wound 12/12/23 Pressure Injury Ischium Right (Active)   Wound Image    06/05/24 1300   Site Assessment Red;Yellow 06/05/24 1300   Periwound Assessment Scar tissue 06/05/24 1300   Margins Unattached edges 06/05/24 1300   Drainage Amount Moderate 06/05/24 1300   Drainage Description Serosanguineous 06/05/24 1300   Treatments Cleansed;Provider debridement 06/05/24 1300   Offloading/DME Other (comment) 03/27/24 1625   Wound Cleansing Hypochlorus Acid 06/05/24 1300   Periwound Protectant No-sting Skin Prep;Drape 06/05/24 1300   Dressing Changed Changed 06/05/24 1300   Dressing Cleansing/Solutions Not Applicable 06/05/24 1300   Dressing Options Wound Vac;Offloading Dressing - Sacral 06/05/24 1300   Dressing Change/Treatment Frequency Monday, Wednesday, Friday, and As Needed 06/05/24 1300   Wound Team Following Weekly 06/05/24 1300   WOUND NURSE ONLY - Pressure Injury Stage 4 06/05/24 1300   Non-staged Wound Description Not applicable 05/08/24 1500   Wound Length (cm) 2.5 cm 06/05/24 1300   Wound Width (cm) 1.8 cm 06/05/24 1300   Wound Depth (cm) 1.8 cm 06/05/24 1300   Wound Surface Area (cm^2) 4.5 cm^2 06/05/24 1300   Wound Volume (cm^3) 8.1 cm^3 06/05/24 1300   Post-Procedure Length  (cm) 2.6 cm 06/05/24 1300   Post-Procedure Width (cm) 1.8 cm 06/05/24 1300   Post-Procedure Depth (cm) 1.8 cm 06/05/24 1300   Post-Procedure Surface Area (cm^2) 4.68 cm^2 06/05/24 1300   Post-Procedure Volume (cm^3) 8.424 cm^3 06/05/24 1300   Wound Healing % 86 06/05/24 1300   Wound Bed Granulation (%) 100 % 03/06/24 1000   Tunneling (cm) 0 cm 06/05/24 1300   Tunneling Clock Position of Wound 2 03/06/24 1000   Undermining (cm) 3.5 cm 06/05/24 1300   Undermining of Wound, 1st Location From 11 o'clock;To 2 o'clock 06/05/24 1300   Undermining (cm) - 2nd location 1.5 cm 06/05/24 1300   Undermining of Wound, 2nd Location From 6 o'clock;To 8 o'clock 06/05/24 1300   Wound Odor None 06/05/24 1300   Exposed Structures None 06/05/24 1300   Number of days: 176       Wound 02/16/24 Full Thickness Wound Leg Medial Left (Active)   Wound Image    06/05/24 1300   Site Assessment Scabbed 06/05/24 1300   Periwound Assessment Hyperpigmented;Edema;Fragile 06/05/24 1300   Margins Attached edges 05/29/24 1600   Drainage Amount Scant 06/05/24 1300   Drainage Description Serosanguineous 06/05/24 1300   Treatments Cleansed 06/05/24 1300   Wound Cleansing Foam Cleanser/Washcloth 06/05/24 1300   Periwound Protectant No-sting Skin Prep 06/05/24 1300   Dressing Cleansing/Solutions Not Applicable 06/05/24 1300   Dressing Options [facility] [place];Offloading Dressing - Sacral;Tubigrip 06/05/24 1300   Dressing Change/Treatment Frequency Monday, Wednesday, Friday, and As Needed 06/05/24 1300   Wound Team Following Weekly 06/05/24 1300   Non-staged Wound Description Full thickness 06/05/24 1300   Wound Length (cm) 0.3 cm 05/29/24 1600   Wound Width (cm) 0.3 cm 05/29/24 1600   Wound Depth (cm) 0.3 cm 05/29/24 1600   Wound Surface Area (cm^2) 0.09 cm^2 05/29/24 1600   Wound Volume (cm^3) 0.027 cm^3 05/29/24 1600   Post-Procedure Length (cm) 0.3 cm 04/24/24 1530   Post-Procedure Width (cm) 0.3 cm 04/24/24 1530   Post-Procedure Depth (cm) 0.1 cm 04/24/24 1530    Post-Procedure Surface Area (cm^2) 0.09 cm^2 04/24/24 1530   Post-Procedure Volume (cm^3) 0.009 cm^3 04/24/24 1530   Wound Healing % 99 05/29/24 1600   Tunneling (cm) 0 cm 05/29/24 1600   Undermining (cm) 0 cm 05/29/24 1600   Wound Odor None 06/05/24 1300   Exposed Structures KEN 06/05/24 1300   Number of days: 110       Wound 05/01/24 Pressure Injury Ischium Left (Active)   Wound Image    06/05/24 1300   Site Assessment Red;Yellow;Grey;Slough 06/05/24 1300   Periwound Assessment Blanchable erythema;Intact 06/05/24 1300   Margins Unattached edges 06/05/24 1300   Closure Secondary intention 05/08/24 1500   Drainage Amount Large 06/05/24 1300   Drainage Description Serosanguineous 06/05/24 1300   Treatments Cleansed;Provider debridement;Wound culture 06/05/24 1300   Wound Cleansing Hypochlorus Acid 06/05/24 1300   Periwound Protectant No-sting Skin Prep;Barrier Paste 06/05/24 1300   Dressing Status Intact 05/01/24 1600   Dressing Changed New 06/05/24 1300   Dressing Cleansing/Solutions Not Applicable 06/05/24 1300   Dressing Options Hydrofiber Silver Strip;Super Absorbent Pad;Offloading Dressing - Sacral 06/05/24 1300   Dressing Change/Treatment Frequency Monday, Wednesday, Friday, and As Needed 06/05/24 1300   Wound Team Following Weekly 06/05/24 1300   WOUND NURSE ONLY - Pressure Injury Stage 4 06/05/24 1300   Non-staged Wound Description Full thickness 06/05/24 1300   Wound Length (cm) 4.5 cm 06/05/24 1300   Wound Width (cm) 5.3 cm 06/05/24 1300   Wound Depth (cm) 6.5 cm 06/05/24 1300   Wound Surface Area (cm^2) 23.85 cm^2 06/05/24 1300   Wound Volume (cm^3) 155.025 cm^3 06/05/24 1300   Post-Procedure Length (cm) 4.6 cm 06/05/24 1300   Post-Procedure Width (cm) 5.3 cm 06/05/24 1300   Post-Procedure Depth (cm) 6.5 cm 06/05/24 1300   Post-Procedure Surface Area (cm^2) 24.38 cm^2 06/05/24 1300   Post-Procedure Volume (cm^3) 158.47 cm^3 06/05/24 1300   Wound Healing % -22427 06/05/24 1300   Tunneling (cm) 0 cm  "06/05/24 1300   Undermining (cm) 4 cm 06/05/24 1300   Undermining of Wound, 1st Location From 9 o'clock;To 12 o'clock 06/05/24 1300   Wound Odor Mild;Foul 06/05/24 1300   Exposed Structures Bone 06/05/24 1300   Number of days: 35       PROCEDURE: Excisional debridement of right ischial ulcer  -Curette used to debride wound beds. Excisional debridement was performed to remove devitalized tissue until healthy, bleeding tissue was visualized.  Total area debrided 15 cm², including into undermining.  Tissue debrided into the muscle / fascia layer  -Bleeding controlled with manual pressure.    -Wound care completed by wound RN, refer to flowsheet  -Patient tolerated the procedure well, without c/o pain or discomfort.      PROCEDURE: Excisional debridement of left ischial ulcer  -2% viscous lidocaine applied topically to wound bed for approximately 5 minutes prior to debridement  - Forceps and scissors used to remove slough and bone fragments from wound bed  -Curette then used to debride wound bed.  Excisional debridement was performed to remove devitalized tissue until healthy, bleeding tissue was visualized.   Entire surface of wound, 24.38 cm2 debrided.  Tissue debrided at deepest point into bone with removal of bone fragments.  -Bleeding controlled with manual pressure.    -Wound care completed by wound RN, refer to flowsheet  -Patient tolerated the procedure well, without c/o pain or discomfort.        Pertinent Labs and Diagnostics:    Labs:     A1c: No results found for: \"HBA1C\"     [facility] results, 7/1/2022 (under media tab)    CRP 13    ESR 31      IMAGING:     X-ray left tib-fib ordered 2/16/2024 through [facility]    12/11/2023-CT of abdomen pelvis with contrast  IMPRESSION:   1.  Right ischial decubitus ulcer extending to bone with soft tissue gas tracking along the right perineum. Appearance suggesting osteomyelitis, consider component of necrotizing fasciitis as clinically appropriate.  2.  Small " pericardial effusion  3.  Left adrenal nodule, density on prior noncontrast CT demonstrates adenoma.  4.  Hepatomegaly  5.  Enlarged prostate, workup and evaluation for causes of prostate enlargement recommended as clinically appropriate.  6.  Atherosclerosis and atherosclerotic coronary artery disease    12/15/2023-bone scan of left foot  IMPRESSION:     1.  Mild increased activity in the LEFT 1st and 3rd toes on blood pool and delayed images possibly indicating inflammation/infection.  2.  No significant blood flow asymmetry.          VASCULAR STUDIES: No results found.    LAST  WOUND CULTURE:   Lab Results   Component Value Date/Time    CULTRSULT  04/29/2024 08:01 PM     3 or more organisms isolated, culture of doubtful  significance, please recollect.  Mixed enteric ade >100,000 cfu/mL        PATHOLOGY  2/17/2023-bone fragment extracted from left lower extremity wound\\  FINAL DIAGNOSIS:     A. Left leg bone fragment at base of chronic wound:          Extensively degenerated fibrocartilaginous tissue with a rim of           fibrinopurulent debris          Correlate with culture findings            Comment: While no residual intact bone is identified, these           findings are suggestive of adjacent osteomyelitis.      ASSESSMENT AND PLAN:     1. Sacral decubitus ulcer, stage IV (HCC)  Comments: Ulcer first noted in early April 2022 as small open area which quickly enlarged.  This ulcer is present distal from previous sacral ulcer which healed after surgery in January.  Patient has history of flap reconstruction x2 to this area.    6/5/2024: Remains resolved.  High risk for recurrence  -Patient to return to clinic weekly for assessment and debridement  -Patient does spend a lot of time up in his wheelchair, and wishes to continue to do so for his quality of life.  He lives independently, drives, and is involved with family activities.  He does have a [employer] wheelchair cushion, suspect may be inadequate. Has  been referred to [facility] for repeat seating evaluation.  - Known OM that was previously treated. CT scan done during recent hospitalization did not show OM of sacrum, however OM of the ischium noted.  -Monitor site each clinic visit    Wound care: Silicone foam pressure relieving dressing    2.  Pressure injury of right ischium, stage IV  Comments: Abscess and OM found on CT during hospitalization in December 2023.  Patient underwent I&D with VAC placement.  IV antibiotics through 1/22/2024.    6/5/2024: Wound largely unchanged  - Excisional debridement of nonviable tissue from wound in clinic today, medically necessary to promote wound healing   -Home health has been instructed to continue Dakins soaked gauze to wound for  15 minutes prior to placement of new VAC dressing as needed for odor / slough.  -Continue wound VAC to right ischial wound  -Patient to return to clinic weekly for assessment and debridement  -Patient states that he is up in his wheelchair for 8 to 10 hours/day, repositions frequently, and is using capabilities of his wheelchair to reposition back and seat  -Home health to change dressing 2 times per week in between clinic visits.  Dakin's soaks for 15 minutes each dressing change  -Patient is very well versed on pressure relief measures, has adequate surface on bed, alternating low air loss.  -Seating eval ordered through [employer] on previous visit.  Previous provider had contacted [facility] and they planned on seating eval today. He has not heard from them. I called [facility] today and left a message.    Wound care:  NPWT at -125 mmHg to accelerate granulation, change 3 times per week, sacral offloading foam.    3. Postoperative wound dehiscence, subsequent encounter  4. Osteomyelitis of left lower extremity  Comments: On 4/26/2022 patient underwent irrigation and debridement of multiple compartments of the left lower extremity states for pressure injury, with bone excision, and complex closure  of chronic wound using biologic skin substitute.  During postop visit in surgeons office on 5/11, surgical site was noted to be dehisced.      6/5/2024: Small open wound with friable tissue.  No significant change past week.  Wound has waxed and waned over the course of treatment.  Known osteomyelitis.  Patient has declined amputation  -No excisional debridement of these wounds required today  -Patient to be mindful of offloading when supine, should assure that his leg is not rotating medially    Wound care: Silicone foam dressing, Tubigrip D    5. Deep tissue injury  6. Pressure injury of left foot, stage IV  Comments: DTI to posterior left lower leg first observed in clinic 7/29/2022.  Patient does not know how it started, had been wearing heel float boot consistently.  Evolved into stage IV pressure injury    6/5/2024: Both wounds are resolved  -High risk for recurrence, has opened and closed several times before  -Monitor site each clinic visit     Care: Open to air    7. Pressure injury of left heel, stage 3 ([facility])  Comments: First noted in clinic on 10/21/2022, originally noted to be stage II    6/5/2024: Remains resolved.  -High risk for recurrence, monitor each clinic visit  -Prevalon boot to alleviate pressure    Wound care: Silicone heel offloading dressing    8.  Pressure injury of left leg, stage III    6/5/2024: Remains resolved  -Wound is closed, however historically has closed and recurred multiple times  -Monitor each clinic visit   Wound Care: Open to air, Tubigrip to manage edema    9.  Pressure injury of left ischium, stage IV    6/5/2024: Wound continues to deteriorate. Now with bone exposed and with bone fragments in wound bed.  -Excisional debridement of wound in clinic today, medically necessary to promote wound healing. Debrided into bone with removal of bone fragments  - Sent bone fragments for culture and pathology. Deep wound culture obtained aswell  - With new bone exposed, new site for  Acute OM. Discussed with KATH DE ANDA and placed referral.  - Will follow up with culture results and adjust as indicated, he is currently taking Augmentin. Will extend Abx once culture results are available.  -Patient to return to clinic weekly for assessment and debridement  -X-ray of pelvis ordered through [facility], be done in patient's home. Scheduled for later today.  -Will plan to start wound VAC as soon he begins treatment for new OM  -Home health to change dressing 2 times per week in between clinic visits.  Instructed to apply Dakin's moistened gauze to wound bed for 15 minutes prior to application of dressing    Wound care: Hydrofiber, Superabsorbent pad offloading sacral dressing    10. Quadriplegia, C5-C7, incomplete (HCC)  Comments: Complicating factor.  Impaired mobility and sensation  -Patient is still spending 7-8 hours/day up in his wheelchair, he has Roho cushion nearly 5 years old, and knows to reposition frequently.  Wears heel float boots bilaterally at all times  Mobility Evaluation:  -Will refer patient to [facility] for Mobility Evaluation with chief complaint of mobility limitations that interfere with daily living activities. Patient would benefit from repeat seating evaluation and custom seat cushion as he continues to develop pressure injuries to b/l ischium which is likely secondary to inadequate seat cushion. I strongly recommend evaluation for a custom seat cushion to limit risk of pressure injuries.  -Diagnosis that limits mobility: Incomplete quadriplegia who is wheelchair dependent.    -Type of mobility device prescribed: Seating evaluation and custom seat cushion.    My total time spent caring for the patient on the day of the encounter was 30 minutes, reviewing history, assessment, counseling and education, and coordination of care including discussing case with KATH Ascencio.  This does not include time spent on separately billable procedures/tests.    Please note that this  note may have been created using voice recognition software. I have worked with technical experts from [facility] to optimize the interface.  I have made every reasonable attempt to correct obvious errors, but there may be errors of grammar and possibly content that I did not discover before finalizing the note.

## 2024-06-06 LAB
GRAM STN SPEC: NORMAL
GRAM STN SPEC: NORMAL
PATHOLOGY CONSULT NOTE: NORMAL
SIGNIFICANT IND 70042: NORMAL
SIGNIFICANT IND 70042: NORMAL
SITE SITE: NORMAL
SITE SITE: NORMAL
SOURCE SOURCE: NORMAL
SOURCE SOURCE: NORMAL

## 2024-06-08 LAB
BACTERIA TISS AEROBE CULT: ABNORMAL
BACTERIA WND AEROBE CULT: ABNORMAL
GRAM STN SPEC: ABNORMAL
GRAM STN SPEC: ABNORMAL
SIGNIFICANT IND 70042: ABNORMAL
SIGNIFICANT IND 70042: ABNORMAL
SITE SITE: ABNORMAL
SITE SITE: ABNORMAL
SOURCE SOURCE: ABNORMAL
SOURCE SOURCE: ABNORMAL

## 2024-06-12 ENCOUNTER — TELEPHONE (OUTPATIENT)
Dept: WOUND CARE | Facility: MEDICAL CENTER | Age: 74
End: 2024-06-12
Payer: MEDICARE

## 2024-06-12 ENCOUNTER — OFFICE VISIT (OUTPATIENT)
Dept: WOUND CARE | Facility: MEDICAL CENTER | Age: 74
End: 2024-06-12
Payer: MEDICARE

## 2024-06-12 VITALS
TEMPERATURE: 98.6 F | HEART RATE: 69 BPM | OXYGEN SATURATION: 96 % | DIASTOLIC BLOOD PRESSURE: 70 MMHG | RESPIRATION RATE: 18 BRPM | SYSTOLIC BLOOD PRESSURE: 118 MMHG

## 2024-06-12 DIAGNOSIS — G82.54 QUADRIPLEGIA, C5-C7 INCOMPLETE (HCC): ICD-10-CM

## 2024-06-12 DIAGNOSIS — L89.893 PRESSURE INJURY OF LEFT LEG, STAGE 3 (HCC): ICD-10-CM

## 2024-06-12 DIAGNOSIS — L89.894 PRESSURE INJURY OF LEFT FOOT, STAGE 4 (HCC): ICD-10-CM

## 2024-06-12 DIAGNOSIS — M86.18 OTHER ACUTE OSTEOMYELITIS, OTHER SITE (HCC): ICD-10-CM

## 2024-06-12 DIAGNOSIS — L89.623 PRESSURE INJURY OF LEFT HEEL, STAGE 3 (HCC): ICD-10-CM

## 2024-06-12 DIAGNOSIS — L89.154 SACRAL DECUBITUS ULCER, STAGE IV (HCC): Primary | ICD-10-CM

## 2024-06-12 DIAGNOSIS — T14.8XXA DEEP TISSUE INJURY: ICD-10-CM

## 2024-06-12 DIAGNOSIS — M86.9 OSTEOMYELITIS OF LEFT LOWER EXTREMITY (HCC): ICD-10-CM

## 2024-06-12 DIAGNOSIS — L89.324 PRESSURE INJURY OF LEFT ISCHIUM, STAGE 4 (HCC): ICD-10-CM

## 2024-06-12 DIAGNOSIS — L89.314 PRESSURE INJURY OF RIGHT ISCHIUM, STAGE 4 (HCC): ICD-10-CM

## 2024-06-12 DIAGNOSIS — T81.31XD POSTOPERATIVE WOUND DEHISCENCE, SUBSEQUENT ENCOUNTER: ICD-10-CM

## 2024-06-12 PROCEDURE — 99213 OFFICE O/P EST LOW 20 MIN: CPT

## 2024-06-12 PROCEDURE — 11046 DBRDMT MUSC&/FSCA EA ADDL: CPT

## 2024-06-12 PROCEDURE — 11043 DBRDMT MUSC&/FSCA 1ST 20/<: CPT

## 2024-06-12 PROCEDURE — 3074F SYST BP LT 130 MM HG: CPT | Performed by: STUDENT IN AN ORGANIZED HEALTH CARE EDUCATION/TRAINING PROGRAM

## 2024-06-12 PROCEDURE — 11046 DBRDMT MUSC&/FSCA EA ADDL: CPT | Performed by: STUDENT IN AN ORGANIZED HEALTH CARE EDUCATION/TRAINING PROGRAM

## 2024-06-12 PROCEDURE — 97606 NEG PRS WND THER DME>50 SQCM: CPT

## 2024-06-12 PROCEDURE — 99214 OFFICE O/P EST MOD 30 MIN: CPT

## 2024-06-12 PROCEDURE — 11043 DBRDMT MUSC&/FSCA 1ST 20/<: CPT | Performed by: STUDENT IN AN ORGANIZED HEALTH CARE EDUCATION/TRAINING PROGRAM

## 2024-06-12 PROCEDURE — 99214 OFFICE O/P EST MOD 30 MIN: CPT | Mod: 25 | Performed by: STUDENT IN AN ORGANIZED HEALTH CARE EDUCATION/TRAINING PROGRAM

## 2024-06-12 PROCEDURE — 3078F DIAST BP <80 MM HG: CPT | Performed by: STUDENT IN AN ORGANIZED HEALTH CARE EDUCATION/TRAINING PROGRAM

## 2024-06-12 RX ORDER — LEVOFLOXACIN 750 MG/1
750 TABLET, FILM COATED ORAL DAILY
Qty: 7 TABLET | Refills: 0 | Status: SHIPPED | OUTPATIENT
Start: 2024-06-12 | End: 2024-06-19

## 2024-06-12 RX ORDER — AMOXICILLIN AND CLAVULANATE POTASSIUM 875; 125 MG/1; MG/1
1 TABLET, FILM COATED ORAL 2 TIMES DAILY
Qty: 14 TABLET | Refills: 0 | Status: SHIPPED | OUTPATIENT
Start: 2024-06-12 | End: 2024-06-19

## 2024-06-12 NOTE — PROGRESS NOTES
Provider Encounter- Pressure Injury        HISTORY OF PRESENT ILLNESS  Wound History:    START OF CARE IN CLINIC: 1/31/2024 (return to clinic after hospitalization)    REFERRING PROVIDER: Racquel York       WOUNDS-superior coccyx pressure injury, stage IV                                 Coccyx pressure injury, stage IV           Inferior coccyx pressure injury, stage IV                                 Right ischial pressure injury, stage IV                                 Left heel pressure injury, recurring stage III                                 Left posterior lower leg/calf-stage III                                 Left medial lower leg surgical wound dehiscence-resolved, reopens frequently            Left ischial pressure injury, stage IV-first observed in clinic 5/1/2024          HISTORY: Patient with history of incomplete quadriplegia referred to [facility] for treatment of a stage IV pressure injury.  He has a history of previous pressure injuries to this area, and underwent muscle flaps in 2019, and then again in 2020.  He was seen in the wound clinic in November 2021 for an ulcer proximal from his current ulcer, and pressure injuries to his left posterior lower leg and left heel.  At that time, it was discovered that the patient had retained VAC foam embedded in the wound bed of the sacral wound.  Attempts were made to get him back to his plastic surgeon, though unsuccessful.  In January he underwent surgical removal of VAC sponge along with excisional debridement of his sacral wound by Dr. Chaves.  After the surgery, his wound went on to heal without incident.   In early April 2022, his home health nurse noted a new sacral ulcer, below the previous ulcer which quickly tripled in size over the following weeks.  The ulcer to his left medial lower leg had also deteriorated, with bone visible at the base..  He was hospitalized from 4/22 until 4/27/2022 and underwent surgery with Dr. Raman on 4/26 for  irrigation and debridement of multiple compartments of the left lower extremity, bone excision, and complex closure of chronic wound using biologic skin substitute.   His sacrococcygeal wound was not surgically addressed during this admission.  He was discharged back to his group home, with home health, and referral to outpatient wound clinic for his sacral wound.  He was instructed to follow-up with his surgeon for his lower leg wound.       Postoperatively, the left medial lower leg incision dehisced.  He was seen by his surgeon at [facility] on 5/11.  The surgeon opted to leave remaining sutures in place, and refer him to the wound clinic for treatment of this wound.   Treatment of this wound was initiated in clinic on 5/12.  During this visit was also noted that his heel DTI had resolved, but that he had a new pressure injury to his left posterior lower extremity.     A new pressure injury was noted to patient's right upper buttock/lower back on 5/20/2022.  Wound was linear in shape, skin discolored but intact.     Abrasion noted to left anterior lower leg.  First observed in clinic on 7/22/2022.  Patient states he bumped his leg into his food tray.     Small DTI noted to patient's left lateral lower leg on 7/29/2022.  Skin intact but discolored.     Large area of deep tissue injury noted to patient's left exterior lower leg.  Patient denied any trauma to this area.  Skin intact.  Wound documented.    1/27/2023: Patient was admitted to [facility] from 1/23-1/25/2023 with gross hematuria. He underwent RICHARD which showed watchman device was in place and he was taken off of Xarelto. While hospitalized wound team was consulted. He was referred back to [facility] and home health upon discharge.    Patient was hospitalized at [facility] for pyelonephritis from 2/26 until 3/2/2023, admitted for fever and general malaise.  He was admitted and initially started on linezolid and meropenem for suspected UTI and history of multidrug-resistant  organisms.  Urine cultures were negative. ID was consulted, recommended CT of chest and abdomen,which were negative for acute findings. However, he was treated with 5 days total course of antibiotics for suspected UTI, and symptoms completely resolved.  During this admission, the inpatient wound team was consulted for treatment of his sacral and lower leg wounds.  A wound culture was taken from his left heel pressure ulcer, negative.  Once stabilized, he was discharged home and referred back to [facility] to resume treatment of his wounds.    Patient was hospitalized at [facility] from 12/11 until 12/23/2023, admitted for fever.  Wound infection suspected.  CT scan of abdomen and pelvis for evaluation of sacral pressure injury showed gas tracking down to the bone consistent with osteomyelitis.  He underwent I&D of right ischial ulcer (documented as buttock) with Dr. Bansal, medial tract leading to an abscess was identified.  Cavity was opened allowing it to drain into the main wound bed.  Wound VAC was placed and managed by wound team during this admission.  A bone scan of patient's left foot was also done, initially concerning for osteomyelitis.  Orthopedic surgery was consulted and did not recommend surgical intervention. ID consulted also, recommended the patient to receive IV ertapenem 1 g every 24 hours plus IV daptomycin 8 mg/kg every 24 hours through 1/22/2024.   He was discharged to LTAC on 1/23 for IV antibiotics and wound care.  From the LTAC he was discharged home on 1/22 with home health and referral back to [facility] to resume management of his wounds  .      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  Never used smokeless tobacco    Patient's problem list, allergies, and current medications reviewed and updated in Epic    Interval History:  Interval History thinned 7/29/2022.  Please see previous notes for complete interval history.   Interval History thinned 1/27/2023. Please see previous note for complete interval history.  Interval History thinned 3/3/2023.  Please see previous notes for complete interval history.    Interval History thinned 8/4/2023.  Please see previous notes for complete interval history.    Interval History thinned 1/31/2024.  Please see previous notes for complete interval history.      1/31/2024 Initial clinic visit with ADILSON Arthur, HOLDEN, CWDINESHN, CFCN.   Patient returns to clinic after hospitalization and LTAC stay.  VAC in place to right ischial wound.  Sacral wounds have progressed significantly since he was last seen in clinic.  Left medial wound has healed, though covered with friable tissue.  Left heel ulcer, previously resolved has reopened slightly.  He states that he is feeling well overall, denies fevers, chills, nausea, vomiting, cough or shortness of breath.     2/7/2024: Clinic visit with Steve Hodges MD. Patient reports feeling in normal state of health. Patient denies any alarms from wound VAC, however today he presented with significant odor from ischial wound and dressing was partially avulsed leaving in adequate suction. Machine was checked and functioning well, there were no recorded alarms.    2/16/24: Clinic visit with Dana DE ANDA, HOLDEN, CWON, CFCN.  Pt denies fevers, chills, nausea, vomiting.  Left shin fluctuance to wound with hematoma and dark sanguinous drainage.  Bone fragment removed during debridement.  Obtain x-ray as residual bone palpated.  [facility] through [facility].  Coccyx wound has decreased from 3 ulcers to now 1.  Right ischial wound with slough, odor.  Dakin soaked performed during visit.  Wound  debrided.  Able to continue VAC postdebridement.     2/21/2024: Clinic visit with Steve Hodges MD. Patient reports doing ok, reports feeling well. Left medial lower extremity wound is measuring larger with thick slough and friable tissue. Xray completed today, will undoubtedly demonstrate known OM. Patient denies any issues with wound VAC. Home health has been soaking Ischial wound with Dakins prior to applying wound VAC.    2/28/2024: Clinic visit with Steve Hodges MD. Patient recently tested positive for COVID. Reports some congestion, cough, and brain fog. Denies other symptoms. Patient with new wounds to left lower extremity undoubtedly associated with known underlying OM. Patient's sacral and ischial wound appear to be improving, measuring smaller.    3/6/2024: Clinic visit with Steve Hodges MD. Patient reports feeling much better, COVID symptoms have resolved. Patient's left lower extremity wounds are stable, posterior leg wounds appear resolved. Patient sacral wound is stable and ischial wound is improving.    3/13/2024 : Clinic visit with ADILSON Arthur, FNP-BC, CWOCN, CFCN.   Patient states he is feeling well, offers no complaints.  Left lower extremity wounds continue to wax and wane.  Sacral and ischial wounds slowly progressing.    3/20/2024: Clinic visit with Steve Hodges MD. Patient reports doing ok. Denies any acute issues. Leg wounds continue to wax and wane. He reports there was more slough and increased odor from ischial wound so home health packed with dakins prior to applying VAC. No odor today in clinic.    3/27/2024: Clinic visit with Steve Hodges MD. Patient reports doing ok. Unfortunately home health did not pre-drape bridging the trac pad from wound VAC and right buttocks / hip skin is irritated and at risk of breaking down. Left leg wounds have improved. Sacral and ischial pressure injuries are stable.    4/3/2024: Clinic visit with Steve Hodges MD. Patient reports  doing well, denies any acute issues. Leg wounds are all improving, few new pinpoint areas of skin breakdown. Sacral and ischial pressure injuries slightly improved. Skin on right buttocks and hip has improved and did not fully breakdown.    4/10/2024 : Clinic visit with ADILSON Arthur, HOLDEN, IMAN, LILIYA.   Patient continues to feel well.  Today, all of his left lower extremity wounds are healed, though historically have tended to open and close.  Sacral and ischial wounds both measure bit larger today.    4/17/2024 : Clinic visit with ADILSON Arthur, HOLDEN, LILIYA VALERIO.   Anjum states he is feeling very well.  Left lower extremity wounds remain closed.  Sacral/coccyx ulcers and right ischial ulcer continue to wax and wane.      4/24/2024 : Clinic visit with ADILSON Arthur, HOLDEN, LILIYA VALERIO.   Anjum states he is feeling well.  He is left medial lower leg wound, and left heel wounds have reopened slightly, neither of which required excisional debridement today.  Sacral and ischial wounds are slowly progressing.   He has not yet obtained quarter strength Dakin's for soaks with VAC dressing change.  He decided to order from [facility].  However, I also sent an order to [facility] last week.    5/1/2024 : Clinic visit with ADILSON Arthur, HOLDEN, IMAN, LILIYA.   Anjum is not feeling well, very fatigued.  He is again COVID-positive, was in the ED 2 nights ago with fever and chills.  Chest x-ray showed no evidence of pneumonia.  A CT of his abdomen and pelvis was also done, showed apparent bone resorption of the ischial tuberosity, suggesting osteomyelitis-this is not a new finding.  Patient was treated for ischial OM in December 2023 and January of this year.  He was discharged home on cefdinir.   Today he is afebrile, but states he has been extremely fatigued.  He admits to sitting in his wheelchair more than usual.  He does have a new pressure injury to his left ischium, treatment initiated in  "clinic today.    5/8/2024: Clinic visit with Steve Hodges MD. Patient reports feeling much better, reports has recovered from COVID and feeling in normal state of health. His sacral wound has resolved. Right ischial wound measuring smaller. Left ischial wound largely unchanged with thick adherent slough.    5/15/2024: Clinic visit with Steve Hodges MD. Patient reports feeling in normal state of health. He received a letter from medicare denying appeal to continue wound VAC. Clinic staff looking into this as he has been making progress with VAC use, appears that order to continue NPWT was sent to different facility. I have signed order to continue NPWT. Patient continues to develop new pressure injuries and worsening of left ischial wound. He has a roho wheelchair cushion, but with new wounds this may be inadequate. Will order new seating eval from [employer].    5/22/2024 : Clinic visit with ADILSON Arthur, HOLDEN, IMAN, LILIYA.   Patient states he is feeling well overall.  His left ischial ulcer has deteriorated, increased necrotic tissue.  He states that he has been sitting more than usual while a friend of his was visiting from out of town.  He has heard from [employer], and there will be someone coming to his home to do a seating eval in the next few weeks.      5/29/2024 : Clinic visit with ADILSON Arthur, HOLDEN, IMAN, CFTRACI.   Turk presents today with significant deterioration of his ischial wounds.  The left wound in particular is markedly worse with increased slough and odor.  States he has not been spending more time up in his wheelchair than usual.  He has not heard from [employer] yet regarding seating eval.  I contacted Pinky Gary at [employer] to find out when his seating eval would be completed.  Was informed that someone would be up to see him next week.   Patient states he cannot have MRI because he has, \"ferrous metal\" in his body, placed in the 1970s.  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  Will start with x-ray of pelvis, will have [facility] do this in his living facility.   In the meantime, I counseled patient to minimize time up in wheelchair, and to make sure that while in bed he is to make sure that pelvis is tilted when head of bed is above 30 degrees.    6/5/2024: Clinic visit with Steve Hodges MD. Patient reports feeling ok, denies any fevers. He was started on Abx last week, was only able to obtain on Monday and started taking. Left ischial wound noted now to have exposed bone with few small bone fragments and some slough. He does report copious drainage from left ischial wound, home health has had to come out more frequently this week to change dressings due to saturation. With new exposed bone concern for OM, will send bone fragments for culture and path. He has Xray scheduled later today. Patient has not heard from [facility], [facility] to see about status of seating evaluation.    6/12/2024: Clinic visit with Steve Hodges MD. Patient reports that he feels in normal state of health. Denies any symptoms of systemic infection. Patient's left ischial wound measuring larger and still with exposed bone. Reinforced results from last weeks workup confirms acute OM polymicrobial and concerning with ESBL Proteus. He has appointment with ID next week. Discussed with pharmacy, continuing Augmentin and will add Levaquin as appears to have some intermediate coverage to ESBL proteus. As OM is currently being treated will start wound VAC to left ischium.    REVIEW OF SYSTEMS:   Unchanged from previous wound clinic assessment on 6/5/2024, except as noted in interval history above.         PHYSICAL EXAMINATION:   /70   Pulse 69   Temp 37 °C (98.6 °F) (Temporal)   Resp 18   SpO2 96%   Physical Exam  Constitutional:       Appearance: He is obese.   Cardiovascular:      Rate and Rhythm: Normal rate.    Pulmonary:      Effort: Pulmonary effort is normal.   Abdominal:      Comments: Colostomy left lower quadrant   Genitourinary:     Comments: Suprapubic catheter to down drain   Skin:     Comments: Stage IV coccygeal pressure injury -remains resolved    Stage IV pressure injury to right ischium- Wound slightly larger. No areas of granulation.  Moderate serosanguineous drainage.  No odor noted.  No periwound erythema or induration    Full-thickness wound to left medial lower leg, surgical wound dehiscence-area about the same, poor tissue quality, fragile epithelium to periwound.  Scant drainage    Stage III pressure injury left posterior heel -remains resolved, covered with fragile epithelium    Stage III pressure injury to posterior left lower leg- remains resolved with area of discoloration, skin intact    Stage IV pressure injury of left ischium- Wound measures larger, continues to have palpable bone. Less slough. Heavy serosanguineous drainage, odor has improved. No periwound erythema or induration.     Neurological:      Mental Status: He is alert and oriented to person, place, and time.   Psychiatric:         Mood and Affect: Mood normal.         WOUND ASSESSMENT  Wound 12/12/23 Pressure Injury Ischium Right (Active)   Wound Image    06/12/24 1527   Site Assessment Red;Yellow 06/12/24 1527   Periwound Assessment Scar tissue 06/12/24 1527   Margins Unattached edges 06/12/24 1527   Drainage Amount Large 06/12/24 1527   Drainage Description Serosanguineous 06/12/24 1527   Treatments Cleansed;Provider debridement;Site care 06/12/24 1527   Offloading/DME Other (comment) 03/27/24 1625   Wound Cleansing Hypochlorus Acid 06/12/24 1527   Periwound Protectant Skin Protectant Wipes to Periwound;Drape 06/12/24 1527   Dressing Changed Changed 06/05/24 1300   Dressing Cleansing/Solutions Not Applicable 06/12/24 1527   Dressing Options Wound Vac;Offloading Dressing - Sacral 06/12/24 1527   Dressing Change/Treatment Frequency  Monday, Wednesday, Friday, and As Needed 06/12/24 1527   Wound Team Following Weekly 06/05/24 1300   WOUND NURSE ONLY - Pressure Injury Stage 4 06/12/24 1527   Non-staged Wound Description Not applicable 05/08/24 1500   Wound Length (cm) 3.2 cm 06/12/24 1527   Wound Width (cm) 3.5 cm 06/12/24 1527   Wound Depth (cm) 1 cm 06/12/24 1527   Wound Surface Area (cm^2) 11.2 cm^2 06/12/24 1527   Wound Volume (cm^3) 11.2 cm^3 06/12/24 1527   Post-Procedure Length (cm) 3.3 cm 06/12/24 1527   Post-Procedure Width (cm) 3.5 cm 06/12/24 1527   Post-Procedure Depth (cm) 1 cm 06/12/24 1527   Post-Procedure Surface Area (cm^2) 11.55 cm^2 06/12/24 1527   Post-Procedure Volume (cm^3) 11.55 cm^3 06/12/24 1527   Wound Healing % 80 06/12/24 1527   Wound Bed Granulation (%) 100 % 03/06/24 1000   Tunneling (cm) 0 cm 06/12/24 1527   Tunneling Clock Position of Wound 2 03/06/24 1000   Undermining (cm) 3.5 cm 06/12/24 1527   Undermining of Wound, 1st Location From 11 o'clock;To 2 o'clock 06/12/24 1527   Undermining (cm) - 2nd location 1.5 cm 06/05/24 1300   Undermining of Wound, 2nd Location From 6 o'clock;To 8 o'clock 06/05/24 1300   Wound Odor None 06/12/24 1527   Exposed Structures None 06/12/24 1527   Number of days: 183       Wound 02/16/24 Full Thickness Wound Leg Medial Left (Active)   Wound Image   06/12/24 1527   Site Assessment Other (Comment) 06/12/24 1527   Periwound Assessment Hyperpigmented;Fragile;Edema 06/12/24 1527   Margins Attached edges 05/29/24 1600   Drainage Amount None 06/12/24 1527   Drainage Description Serosanguineous 06/05/24 1300   Treatments Cleansed;Site care 06/12/24 1527   Wound Cleansing Foam Cleanser/Washcloth 06/12/24 1527   Periwound Protectant Skin Protectant Wipes to Periwound 06/12/24 1527   Dressing Cleansing/Solutions Not Applicable 06/12/24 1527   Dressing Options [place] [place];Offloading Dressing - Sacral;Offloading Dressing - Heel;Tubigrip 06/12/24 1527   Dressing Change/Treatment Frequency Monday,  Wednesday, Friday, and As Needed 06/12/24 1527   Wound Team Following Weekly 06/05/24 1300   Non-staged Wound Description Not applicable 06/12/24 1527   Wound Length (cm) 0.3 cm 05/29/24 1600   Wound Width (cm) 0.3 cm 05/29/24 1600   Wound Depth (cm) 0.3 cm 05/29/24 1600   Wound Surface Area (cm^2) 0.09 cm^2 05/29/24 1600   Wound Volume (cm^3) 0.027 cm^3 05/29/24 1600   Post-Procedure Length (cm) 0.3 cm 04/24/24 1530   Post-Procedure Width (cm) 0.3 cm 04/24/24 1530   Post-Procedure Depth (cm) 0.1 cm 04/24/24 1530   Post-Procedure Surface Area (cm^2) 0.09 cm^2 04/24/24 1530   Post-Procedure Volume (cm^3) 0.009 cm^3 04/24/24 1530   Wound Healing % 99 05/29/24 1600   Tunneling (cm) 0 cm 05/29/24 1600   Undermining (cm) 0 cm 05/29/24 1600   Wound Odor None 06/12/24 1527   Exposed Structures None 06/12/24 1527   Number of days: 117       Wound 05/01/24 Pressure Injury Ischium Left (Active)   Wound Image    06/12/24 1527   Site Assessment Red;Yellow;Slough 06/12/24 1527   Periwound Assessment Blanchable erythema;Fragile 06/12/24 1527   Margins Unattached edges 06/12/24 1527   Closure Secondary intention 05/08/24 1500   Drainage Amount Copious 06/12/24 1527   Drainage Description Serous;Serosanguineous 06/12/24 1527   Treatments Cleansed;Provider debridement 06/12/24 1527   Wound Cleansing Hypochlorus Acid 06/12/24 1527   Periwound Protectant Skin Protectant Wipes to Periwound;Drape 06/12/24 1527   Dressing Status Intact 05/01/24 1600   Dressing Changed New 06/05/24 1300   Dressing Cleansing/Solutions Not Applicable 06/12/24 1527   Dressing Options Wound Vac;Offloading Dressing - Sacral 06/12/24 1527   Dressing Change/Treatment Frequency Monday, Wednesday, Friday, and As Needed 06/12/24 1527   Wound Team Following Weekly 06/05/24 1300   WOUND NURSE ONLY - Pressure Injury Stage 4 06/12/24 1527   Non-staged Wound Description Full thickness 06/05/24 1300   Wound Length (cm) 5 cm 06/12/24 1527   Wound Width (cm) 6.5 cm  "06/12/24 1527   Wound Depth (cm) 4.5 cm 06/12/24 1527   Wound Surface Area (cm^2) 32.5 cm^2 06/12/24 1527   Wound Volume (cm^3) 146.25 cm^3 06/12/24 1527   Post-Procedure Length (cm) 5.1 cm 06/12/24 1527   Post-Procedure Width (cm) 6.5 cm 06/12/24 1527   Post-Procedure Depth (cm) 4.5 cm 06/12/24 1527   Post-Procedure Surface Area (cm^2) 33.15 cm^2 06/12/24 1527   Post-Procedure Volume (cm^3) 149.175 cm^3 06/12/24 1527   Wound Healing % -[id number] 06/12/24 1527   Tunneling (cm) 0 cm 06/12/24 1527   Undermining (cm) 5 cm 06/12/24 1527   Undermining of Wound, 1st Location From 9 o'clock;To 3 o'clock 06/12/24 1527   Wound Odor Mild;Foul 06/12/24 1527   Exposed Structures Tendon;Bone 06/12/24 1527   Number of days: 42       PROCEDURE: Excisional debridement of right and left ischial wounds  -2% viscous lidocaine applied topically to wound bed for approximately 5 minutes prior to debridement  -Curette used to debride wound beds.  Excisional debridement was performed to remove devitalized tissue until healthy, bleeding tissue was visualized.   Entire surface of wounds, 44.7 cm2 debrided.  Tissue debrided into the muscle / fascia layer.    -Bleeding controlled with manual pressure.    -Wound care completed by wound RN, refer to flowsheet  -Patient tolerated the procedure well, without c/o pain or discomfort.    Pertinent Labs and Diagnostics:    Labs:     A1c: No results found for: \"HBA1C\"     [facility] results, 7/1/2022 (under media tab)    CRP 13    ESR 31      IMAGING:     X-ray left tib-fib ordered 2/16/2024 through [facility]    12/11/2023-CT of abdomen pelvis with contrast  IMPRESSION:   1.  Right ischial decubitus ulcer extending to bone with soft tissue gas tracking along the right perineum. Appearance suggesting osteomyelitis, consider component of necrotizing fasciitis as clinically appropriate.  2.  Small pericardial effusion  3.  Left adrenal nodule, density on prior noncontrast CT demonstrates adenoma.  4.  " Hepatomegaly  5.  Enlarged prostate, workup and evaluation for causes of prostate enlargement recommended as clinically appropriate.  6.  Atherosclerosis and atherosclerotic coronary artery disease    12/15/2023-bone scan of left foot  IMPRESSION:     1.  Mild increased activity in the LEFT 1st and 3rd toes on blood pool and delayed images possibly indicating inflammation/infection.  2.  No significant blood flow asymmetry.          VASCULAR STUDIES: No results found.    LAST  WOUND CULTURE:   Lab Results   Component Value Date/Time    CULTRSULT Moderate growth mixed enteric ade. (A) 06/05/2024 01:40 PM    CULTRSULT Bacteroides ovatus group  Moderate growth   (A) 06/05/2024 01:40 PM    CULTRSULT Beta Streptococcus Group C  Moderate growth   (A) 06/05/2024 01:40 PM      PATHOLOGY  2/17/2023-bone fragment extracted from left lower extremity wound\\  FINAL DIAGNOSIS:     A. Left leg bone fragment at base of chronic wound:          Extensively degenerated fibrocartilaginous tissue with a rim of           fibrinopurulent debris          Correlate with culture findings            Comment: While no residual intact bone is identified, these           findings are suggestive of adjacent osteomyelitis.      ASSESSMENT AND PLAN:     1. Sacral decubitus ulcer, stage IV (HCC)  Comments: Ulcer first noted in early April 2022 as small open area which quickly enlarged.  This ulcer is present distal from previous sacral ulcer which healed after surgery in January.  Patient has history of flap reconstruction x2 to this area.    6/12/2024: Remains resolved.  High risk for recurrence  -Patient to return to clinic weekly for assessment and debridement  -Patient does spend a lot of time up in his wheelchair, and wishes to continue to do so for his quality of life.  He lives independently, drives, and is involved with family activities.  He does have a roho wheelchair cushion, suspect may be inadequate. Has been referred to [facility] for  repeat seating evaluation.  - Known OM that was previously treated. CT scan done during recent hospitalization did not show OM of sacrum, however OM of the ischium noted.  -Monitor site each clinic visit    Wound care: Silicone foam pressure relieving dressing    2. Pressure injury of right ischium, stage 4 (HCC)  Comments: Abscess and OM found on CT during hospitalization in December 2023.  Patient underwent I&D with VAC placement.  IV antibiotics through 1/22/2024.    6/12/2024: Wound measuring larger  - Excisional debridement of nonviable tissue from wound in clinic today, medically necessary to promote wound healing   -Home health has been instructed to continue Dakins soaked gauze to wound for  15 minutes prior to placement of new VAC dressing as needed for odor / slough.  -Continue wound VAC to right ischial wound  -Patient to return to clinic weekly for assessment and debridement  -Patient states that he is up in his wheelchair for 8 to 10 hours/day, repositions frequently, and is using capabilities of his wheelchair to reposition back and seat  -Home health to change dressing 2 times per week in between clinic visits.  Dakin's soaks for 15 minutes each dressing change  -Patient is very well versed on pressure relief measures, has adequate surface on bed, alternating low air loss.  -Seating eval ordered through [employer] on previous visit.  Previous provider had contacted [facility] and they planned on seating eval today. He has not heard from them. I called [facility] today and left a message.    Wound care:  NPWT at -125 mmHg to accelerate granulation, change 3 times per week, sacral offloading foam.    3. Pressure injury of left ischium, stage 4 (HCC)    6/12/2024: Wound measuring larger, continues to have exposed bone. Heavy serous drainage.  -Excisional debridement of wound in clinic today, medically necessary to promote wound healing.  - Pathology on bone fragment was positive for acute OM  - Bone culture  and wound culture polymicrobial. Has been referred to ID and has appointment next week  -Patient to return to clinic weekly for assessment and debridement  - As receiving treatment for OM with oral Abx, will start use of NPWT    Wound care: NPWT at -125 mmHg to accelerate granulation, change 3 times per week, sacral offloading foam.    4. Other acute osteomyelitis, other site ([facility])    6/12/2024  - Bone fragments debrided and sent for pathology confirms acute OM  - Bone and wound culture was polymicrobial, most concerning a MDR ESBL Proteus  - After discussion with pharmacy, will continue Augmentin and add Levaquin. While not optimal coverage has intermediate coverage of proteus until he can be seen by ID and will likely require prolonged IV Abx.    5. Postoperative wound dehiscence, subsequent encounter  6. Osteomyelitis of left lower extremity ([facility])  Comments: On 4/26/2022 patient underwent irrigation and debridement of multiple compartments of the left lower extremity states for pressure injury, with bone excision, and complex closure of chronic wound using biologic skin substitute.  During postop visit in surgeons office on 5/11, surgical site was noted to be dehisced.      6/12/2024: Wound appears covered with fragile epithelium.  Wound has waxed and waned over the course of treatment.  Known osteomyelitis.  Patient has declined amputation  -No excisional debridement of these wounds required today  -Patient to be mindful of offloading when supine, should assure that his leg is not rotating medially    Wound care: Silicone foam dressing, Tubigrip D    8. Pressure injury of left foot, stage 4 ([facility])  9. Pressure injury of left leg, stage 3 ([facility])  10. Pressure injury of left heel, stage 3 ([facility])    6/12/2024  - Wounds appear resolved  - High risk for reoccurrence  - Continue to monitor  - Patient aware of offloading.    11. Quadriplegia, C5-C7 incomplete ([facility])  Comments: Complicating factor.  Impaired mobility and  sensation  -Patient is still spending 7-8 hours/day up in his wheelchair, he has Roho cushion nearly 5 years old, and knows to reposition frequently.  Wears heel float boots bilaterally at all times  Mobility Evaluation:  -Will refer patient to [facility] for Mobility Evaluation with chief complaint of mobility limitations that interfere with daily living activities. Patient would benefit from repeat seating evaluation and custom seat cushion as he continues to develop pressure injuries to b/l ischium which is likely secondary to inadequate seat cushion. I strongly recommend evaluation for a custom seat cushion to limit risk of pressure injuries.  -Diagnosis that limits mobility: Incomplete quadriplegia who is wheelchair dependent.    -Type of mobility device prescribed: Seating evaluation and custom seat cushion.      My total time spent caring for the patient on the day of the encounter was 30 minutes, reviewing history, assessment, counseling and education, and coordination of care. Reviewing results with patient, discussing with pharmacy, ordering Abx.  This does not include time spent on separately billable procedures/tests.    Please note that this note may have been created using voice recognition software. I have worked with technical experts from [employer] to optimize the interface.  I have made every reasonable attempt to correct obvious errors, but there may be errors of grammar and possibly content that I did not discover before finalizing the note.

## 2024-06-12 NOTE — PATIENT INSTRUCTIONS
-Keep your wound dressing clean, dry, and intact. Only change dressing if it's over saturated, soiled or falls off.     -Wound vac may not have any drainage in tube or cannister & it will still be working.   Change cannister if it does become full by pressing tab on side of machine to remove canister and snap on new one. Full canister can be thrown in the trash. If cannister fills with bright red blood - go to ER. Dressing will be changed every MWF at the wound clini.  If you are having issues with your wound VAC, please consider patching leaks, changing the canister, or calling [phone] for troubleshooting. If the wound VAC has been off or un-operational for over 2 hours, call wound care center to inform them and remove all dressings including black foam and replace with normal saline damp gauze.     -Should you experience any significant changes in your wound(s), such as infection (redness, swelling, localized heat, increased pain, fever > 101 F, chills) or have any questions regarding your home care instructions, please contact the wound center at [phone]. If after hours, contact your primary care physician or go to the hospital emergency room.

## 2024-06-12 NOTE — PROCEDURES
Wound vac removed by RN inspected by this RN, no retained foam noted. Wound vac >50cm applied to right ischium wound and left ischium wound using 3 pieces of black foam. 1 piece of black foam to fill right ischium wound and bridge to right hip/accommodate track pad, 1 piece of black foam to fill left ischium wound, 1 piece black foam to bridge together two wounds and bridge to right hip.    Negative pressure wound therapy resumed at 125mmHg continuous pressure with no leaks noted.   Small sacral  offloading dressing to wound beds, Large sacral offloading dressing fenestrated and placed around wound vac track pad.

## 2024-06-13 ENCOUNTER — TELEPHONE (OUTPATIENT)
Dept: WOUND CARE | Facility: MEDICAL CENTER | Age: 74
End: 2024-06-13
Payer: MEDICARE

## 2024-06-13 NOTE — TELEPHONE ENCOUNTER
Received phone call from patient stating he already needed to change out the canister this morning. Patient stated that he called [facility], however there was no new order in place for him. This RN spoke with ZAIRA Carvajal who stated that she did not think we needed large dressing kits, only extra vac drape. This RN emailed Leslie Thayer RN, [employer], to update her regarding dressing needs. This RN then called /[employer] customer service and asked them to add 1 case of vac drape to patients current order. Representative was able to help with this need. This RN informed representative on the phone regarding the additional wound that we are applying the wound vac to. Representative stated that they just needed a note stating that we are now treating and additional wound. Provider note was uploaded to [employer] [facility] this morning and Leslie Thayer RN was notified as well. No additional actions should be required at this time.

## 2024-06-13 NOTE — TELEPHONE ENCOUNTER
Email sent to Leslie Thayer RN, [facility], with wound measurements and notified her of vac application to left ischium wound. This RN requested that patient now receive large foam dressing kits and will need extra canisters. Patient will need 10 of each to be shipped out ASAP. Will upload provider note from today's visit, once completed, to  portal.

## 2024-06-16 PROBLEM — Z12.12 SCREENING FOR COLORECTAL CANCER: Status: ACTIVE | Noted: 2023-06-18

## 2024-06-16 PROBLEM — I70.0 AORTIC ATHEROSCLEROSIS (HCC): Status: ACTIVE | Noted: 2024-06-16

## 2024-06-16 PROBLEM — R73.02 IGT (IMPAIRED GLUCOSE TOLERANCE): Status: ACTIVE | Noted: 2024-06-16

## 2024-06-16 PROBLEM — Z12.11 SCREENING FOR COLORECTAL CANCER: Status: ACTIVE | Noted: 2023-06-18

## 2024-06-17 ENCOUNTER — TELEPHONE (OUTPATIENT)
Dept: WOUND CARE | Facility: MEDICAL CENTER | Age: 74
End: 2024-06-17
Payer: MEDICARE

## 2024-06-17 NOTE — TELEPHONE ENCOUNTER
Jeanine from [facility] called and let us know that Anjum has some periwound abraisions that she has covered hydrocolloid and vac drape.

## 2024-06-19 ENCOUNTER — OFFICE VISIT (OUTPATIENT)
Dept: WOUND CARE | Facility: MEDICAL CENTER | Age: 74
End: 2024-06-19
Payer: MEDICARE

## 2024-06-19 ENCOUNTER — OFFICE VISIT (OUTPATIENT)
Dept: INFECTIOUS DISEASES | Facility: MEDICAL CENTER | Age: 74
End: 2024-06-19
Attending: NURSE PRACTITIONER
Payer: MEDICARE

## 2024-06-19 VITALS
OXYGEN SATURATION: 95 % | BODY MASS INDEX: 30.78 KG/M2 | WEIGHT: 215 LBS | DIASTOLIC BLOOD PRESSURE: 65 MMHG | TEMPERATURE: 97.4 F | HEART RATE: 84 BPM | SYSTOLIC BLOOD PRESSURE: 110 MMHG | HEIGHT: 70 IN

## 2024-06-19 VITALS
SYSTOLIC BLOOD PRESSURE: 110 MMHG | TEMPERATURE: 97.4 F | RESPIRATION RATE: 18 BRPM | OXYGEN SATURATION: 95 % | DIASTOLIC BLOOD PRESSURE: 65 MMHG | HEART RATE: 84 BPM

## 2024-06-19 DIAGNOSIS — M86.09 ACUTE HEMATOGENOUS OSTEOMYELITIS OF MULTIPLE SITES (HCC): ICD-10-CM

## 2024-06-19 DIAGNOSIS — L89.324 PRESSURE INJURY OF LEFT ISCHIUM, STAGE 4 (HCC): ICD-10-CM

## 2024-06-19 DIAGNOSIS — L89.314 PRESSURE INJURY OF RIGHT ISCHIUM, STAGE 4 (HCC): ICD-10-CM

## 2024-06-19 DIAGNOSIS — L89.893 PRESSURE INJURY OF LEFT LEG, STAGE 3 (HCC): ICD-10-CM

## 2024-06-19 DIAGNOSIS — M86.18 OTHER ACUTE OSTEOMYELITIS, OTHER SITE (HCC): ICD-10-CM

## 2024-06-19 DIAGNOSIS — L89.623 PRESSURE INJURY OF LEFT HEEL, STAGE 3 (HCC): ICD-10-CM

## 2024-06-19 DIAGNOSIS — L89.90 PRESSURE ULCERS OF SKIN OF MULTIPLE TOPOGRAPHIC SITES: ICD-10-CM

## 2024-06-19 DIAGNOSIS — T81.31XD POSTOPERATIVE WOUND DEHISCENCE, SUBSEQUENT ENCOUNTER: ICD-10-CM

## 2024-06-19 DIAGNOSIS — L89.894 PRESSURE INJURY OF LEFT FOOT, STAGE 4 (HCC): ICD-10-CM

## 2024-06-19 DIAGNOSIS — T14.8XXA DEEP TISSUE INJURY: ICD-10-CM

## 2024-06-19 DIAGNOSIS — L89.154 SACRAL DECUBITUS ULCER, STAGE IV (HCC): ICD-10-CM

## 2024-06-19 DIAGNOSIS — G82.54 QUADRIPLEGIA, C5-C7 INCOMPLETE (HCC): ICD-10-CM

## 2024-06-19 DIAGNOSIS — M86.9 OSTEOMYELITIS OF LEFT LOWER EXTREMITY (HCC): ICD-10-CM

## 2024-06-19 DIAGNOSIS — G82.53 QUADRIPLEGIA, C5-C7 COMPLETE (HCC): Chronic | ICD-10-CM

## 2024-06-19 DIAGNOSIS — Z86.19 HISTORY OF INFECTION DUE TO EXTENDED SPECTRUM BETA LACTAMASE (ESBL) PRODUCING BACTERIA: ICD-10-CM

## 2024-06-19 PROCEDURE — 11046 DBRDMT MUSC&/FSCA EA ADDL: CPT | Performed by: STUDENT IN AN ORGANIZED HEALTH CARE EDUCATION/TRAINING PROGRAM

## 2024-06-19 PROCEDURE — 11043 DBRDMT MUSC&/FSCA 1ST 20/<: CPT

## 2024-06-19 PROCEDURE — 99214 OFFICE O/P EST MOD 30 MIN: CPT | Performed by: NURSE PRACTITIONER

## 2024-06-19 PROCEDURE — 11043 DBRDMT MUSC&/FSCA 1ST 20/<: CPT | Performed by: STUDENT IN AN ORGANIZED HEALTH CARE EDUCATION/TRAINING PROGRAM

## 2024-06-19 PROCEDURE — 11042 DBRDMT SUBQ TIS 1ST 20SQCM/<: CPT

## 2024-06-19 PROCEDURE — 3074F SYST BP LT 130 MM HG: CPT | Performed by: NURSE PRACTITIONER

## 2024-06-19 PROCEDURE — 11046 DBRDMT MUSC&/FSCA EA ADDL: CPT

## 2024-06-19 PROCEDURE — 99212 OFFICE O/P EST SF 10 MIN: CPT | Performed by: NURSE PRACTITIONER

## 2024-06-19 PROCEDURE — 3078F DIAST BP <80 MM HG: CPT | Performed by: NURSE PRACTITIONER

## 2024-06-19 ASSESSMENT — ENCOUNTER SYMPTOMS
NERVOUS/ANXIOUS: 0
COUGH: 0
FEVER: 0
SHORTNESS OF BREATH: 0
WHEEZING: 0
PALPITATIONS: 0
DOUBLE VISION: 0
ABDOMINAL PAIN: 0
MYALGIAS: 0
BLURRED VISION: 0
VOMITING: 0
DIZZINESS: 0
BRUISES/BLEEDS EASILY: 0
CHILLS: 0
FOCAL WEAKNESS: 1
SPUTUM PRODUCTION: 0
WEIGHT LOSS: 0
NAUSEA: 0
HEADACHES: 0

## 2024-06-19 ASSESSMENT — FIBROSIS 4 INDEX: FIB4 SCORE: 2.26

## 2024-06-19 NOTE — PROGRESS NOTES
NPWT dressing change.  -3 pieces of regular black foam removed and 3 pieces of regular black foam replaced.   -Vac restarted at 125 mmHG continuous with no leaks detected.     Home Health orders updated and routed via [employer] to [facility].

## 2024-06-19 NOTE — PROGRESS NOTES
INFECTIOUS  DISEASE  OUTPATIENT CLINIC  NOTE   Subjective   Primary care provider: KATE Phillips.     Reason for Follow Up:   Follow-up for   1. Pressure ulcers of skin of multiple topographic sites        2. History of infection due to extended spectrum beta lactamase (ESBL) producing bacteria        3. Acute hematogenous osteomyelitis of multiple sites (HCC)  IR-PICC LINE PLACEMENT W/ GUIDANCE > AGE 5    CBC WITH DIFFERENTIAL    Comp Metabolic Panel      4. Quadriplegia, C5-C7 complete (HCC)            HPI: Referred back to ID clinic, known to ID team for previous Ischial osteomyelitis with abscess   Osvaldo Pate is a 73 y.o. 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   6 weeks of IV antibiotics for sacral osteomyelitis in 3/23/22. He was hospitalized from 4/22 until 4/27/2022 and underwent surgery with Dr. Raman on 4/26 for irrigation and debridement of multiple compartments of the left lower extremity, bone excision, and complex closure of chronic wound using biologic skin substitute.  Patient also with history of ESBL infection in urine noted in 4/2023. s/p debridement necrotic muscle and bone 12/12/23. Operative cultures 12/12/23 ESBL Proteus (also fluoroquinolone and Bactrim resistant), pan susceptible Enterococcus, Prevotella. Completed 6 weeks of IV ertapenem  + IV daptomycin on 1/22/2024.  Patient continued to follow-up with wound care on a regular basis.  Most recently on 6/5/2024 patient followed up with wound care and was noted to have Left ischial wound with exposed bone and with few small bone fragments and some slough. He does report copious drainage from left ischial wound.  Wound team obtained culture and also sent a small dislodged component of bone to pathology.   "Pathology results positive for acute osteomyelitis, negative for malignancy. Cultures +  Proteus mirabilis esbl, Escherichia coli (pan sensitivity), and group G strep.  Referred to ID clinic for antibiotic therapy       06/19/24- Today Patient reports feeling well.  Pt being followed by the [facility]. Wound pictures reviewed in EPIC. Denies drainage, pungent odor, redness, pain. Denies feeling generally ill, fevers/chills, general malaise, headache, n/v/d.  Bilateral pelvic wounds.  See clinical photo attached below.  After further review of cultures will start the patient on IV ertapenem 1 g every 24 hours, 6-week course with end date tentatively for 8/3/2024 based upon when PICC line and antibiotic therapy is started.  Most likely will alter end date.  Orders placed for home infusion but due to patient's insurance covered may need to go into infusion center.  Patient states that he is able to drive and once daily to infusion center and if needed but would prefer home infusion if possible.  Orders to be faxed to outpatient infusion center for price quote and will update patient.  Medication education provided.  Weekly labs CBC/CMP while on IV antibiotics.  Repeat labs today CBC/CMP and has recent INR for PICC line placement.  Continue wound care on a regular basis.      states he cannot have MRI because he has, \"ferrous metal\" in his body, placed in the 1970s.   Current Antimicrobials:   Previous Antimicrobials:    Other Current Medications:  Home Medications    Medication Sig Taking? Last Dose Authorizing Provider   NON SPECIFIED Take 3 Capsules by mouth every day. [employer] FRUITS-patient to provide supply   Physician Outpatient   NON SPECIFIED Take 3 Capsules by mouth every day. [employer] VEGGIES-patient to provide supply   Physician Outpatient   amoxicillin-clavulanate (AUGMENTIN) 875-125 MG Tab Take 1 Tablet by mouth 2 times a day for 7 days.   Steve LUIS " SHANTI Hodges   levoFLOXacin (LEVAQUIN) 750 MG tablet Take 1 Tablet by mouth every day for 7 days.   Steve Hodges M.D.   Sodium Hypochlorite (DAKINS 0.125%, 1/4 STRENGTH,) 0.125 % Solution Apply 473 mL topically two times a week.   ANITA DuenasP.N.   Sodium Hypochlorite (DAKINS 0.125%, 1/4 STRENGTH,) 0.125 % Solution Wash ischial pressure injury with Dakins, moisten gauze and pack with wound for 10 to 15 minutes prior to applying wound VAC dressing. Use Dakins 2x weekly with dressing changes.   JF Duenas.P.N.   Nutritional Supplements (NUTRITIONAL DRINK PO) Take  by mouth.   Physician Outpatient   amLODIPine (NORVASC) 5 MG Tab Take 1 Tablet by mouth as needed (Per MAR if blood pressure if less than 130/80).   JF Phillips.P.R.N.   baclofen (LIORESAL) 20 MG tablet Take 1 Tablet by mouth 4 times a day.   JF Phillips.P.R.N.   losartan (COZAAR) 50 MG Tab Take 1 Tablet by mouth every day.   Wallace Moyer A.P.R.N.   docusate sodium (COLACE) 100 MG Cap Take 2 Capsules by mouth 2 times a day.   Lex Meier M.D.   Simethicone (GAS-X PO) Take 1 Tablet by mouth 2 times a day as needed (For gas).   Physician Outpatient   acetaminophen (TYLENOL) 500 MG Tab Take 1,000 mg by mouth every 6 hours as needed for Moderate Pain.   Physician Outpatient   diclofenac sodium (VOLTAREN) 1 % Gel Apply 1 g topically 4 times a day. Apply to both elbows   JF Pretty.P.R.NDeniz   polyethylene glycol/lytes (MIRALAX) 17 g Pack Take 17 g by mouth every day. Indications: Constipation   Physician Outpatient   aspirin EC 81 MG EC tablet Take 1 Tablet by mouth every day.   ANITA RussP.R.NDeniz   Zinc 50 MG Tab Take 1 Tablet by mouth every morning.   Physician Outpatient   Cholecalciferol (VITAMIN D3) 2000 UNIT Cap Take 1 Capsule by mouth every morning.   Physician Outpatient   Magnesium 400 MG Tab Take 400 mg by mouth every morning.   Physician Outpatient   Ascorbic Acid  "(VITAMIN C) 1000 MG Tab Take 1 Tablet by mouth every morning.   Physician Outpatient   melatonin 5 mg Tab Take 10 mg by mouth at bedtime. Gummie   Physician Outpatient   Probiotic Product (PROBIOTIC PO) Take 1 Capsule by mouth every morning.   Physician Outpatient   sennosides (SENOKOT) 8.6 MG Tab Take 17.2 mg by mouth 2 times a day.   Physician Outpatient   ferrous sulfate 325 (65 Fe) MG tablet Take 1 Tablet by mouth 2 times a day.   Physician Outpatient        PMH:  Past Medical History:   Diagnosis Date    A-fib ([facility])     Acute cystitis without hematuria 10/23/2021    Acute UTI 06/18/2023    Arrhythmia     Atrial flutter ([facility])     Blood clotting disorder ([facility])     Patient is on Xarelto    Bowel habit changes     Colostomy    Clot hematuria 01/23/2023    GERD (gastroesophageal reflux disease)     Hypertension     Hypokalemia 06/18/2023    Kidney stones     Metabolic acidosis 06/18/2023    Neurogenic bladder     S/P cath    Open wound 11/18/2022    sacrum with wound vac, left ankle, [facility]    Quadriplegia, C5-C7 complete ([facility])     patient reports \" incomplete quad\"    Sepsis secondary to UTI ([facility]) 06/17/2023    SIRS (systemic inflammatory response syndrome) ([facility]) 12/12/2023    Suprapubic catheter ([facility])      Past Surgical History:   Procedure Laterality Date    IRRIGATION & DEBRIDEMENT GENERAL Right 12/12/2023    Procedure: IRRIGATION AND DEBRIDEMENT, WOUND/BUTTOCKS;  Surgeon: Huan Bansal M.D.;  Location: [facility];  Service: General    IRRIGATION & DEBRIDEMENT GENERAL Left 04/26/2022    Procedure: IRRIGATION AND DEBRIDEMENT, WOUND - LEG;  Surgeon: Eric Raman M.D.;  Location: SUR[facility];  Service: Orthopedics    WOUND CLOSURE NEURO Left 04/26/2022    Procedure: CLOSURE, WOUND;  Surgeon: Eric Raman M.D.;  Location: [facility];  Service: Orthopedics    ORTHOPEDIC OSTEOTOMY Left 04/26/2022    Procedure: OSTECTOMY;  Surgeon: Eric Raman M.D.;  Location: " [facility];  Service: Orthopedics    INCISION AND DRAINAGE ORTHOPEDIC Left 01/27/2022    Procedure: INCISION AND DRAINAGE, WOUND, BY ORTHOPEDICS;  Surgeon: Erasmo Stewart M.D.;  Location: [facility];  Service: Orthopedics    BONE BIOPSY Left 01/27/2022    Procedure: BIOPSY, BONE;  Surgeon: Erasmo Stewart M.D.;  Location: [facility];  Service: Orthopedics    INCISION AND DRAINAGE ORTHOPEDIC  01/22/2022    Procedure: INCISION AND DRAINAGE, WOUND, BY ORTHOPEDICS;  Surgeon: Erasmo Stewart M.D.;  Location: [facility];  Service: Orthopedics    WY CYSTOSCOPY,INSERT URETERAL STENT Left 01/04/2022    Procedure: CYSTOSCOPY, WITH URETERAL STENT INSERTION;  Surgeon: Osvaldo Baltazar M.D.;  Location: [facility];  Service: Urology    WY CYSTO/URETERO/PYELOSCOPY, DX Left 01/04/2022    Procedure: URETEROSCOPY;  Surgeon: Osvaldo Baltazar M.D.;  Location: [facility];  Service: Urology    LASERTRIPSY N/A 01/04/2022    Procedure: LITHOTRIPSY, USING LASER;  Surgeon: Osvaldo Baltazar M.D.;  Location: [facility];  Service: Urology    WY CYSTOSCOPY,INSERT URETERAL STENT Left 12/16/2021    Procedure: CYSTOSCOPY, WITH URETERAL STENT INSERTION;  Surgeon: Aly Bowen M.D.;  Location: [facility];  Service: Urology    WY CYSTO/URETERO/PYELOSCOPY, DX Left 12/16/2021    Procedure: URETEROSCOPY;  Surgeon: Aly Bowen M.D.;  Location: [facility];  Service: Urology    LASERTRIPSY Left 12/16/2021    Procedure: LITHOTRIPSY, USING LASER;  Surgeon: Aly Bowen M.D.;  Location: [facility];  Service: Urology    IRRIGATION & DEBRIDEMENT GENERAL  12/20/2020    Procedure: IRRIGATION AND DEBRIDEMENT, WOUND SACRAL ULCER;  Surgeon: Matt Cummins M.D.;  Location: [facility];  Service: Plastics    ULCER DEBRIDEMENT N/A 08/21/2019    Procedure: debridement of Sacral grade 4 ulcer - W/BONE BIOPSY, 3 liter wash out.  bilateral sliding gluteal myocutaneous flap advancement;  Surgeon: Amadeo Moon M.D.;  Location: SURGERY [facility];  Service: Plastics    FLAP CLOSURE  2019    Procedure: CLOSURE, FLAP - MUSCLE;  Surgeon: Amadeo Moon M.D.;  Location: SUR[facility]ERY [facility];  Service: Plastics    COLOSTOMY N/A 2019    Procedure: CREATION, COLOSTOMY -  placement;  Surgeon: Elías Hannah M.D.;  Location: SURGERY [facility];  Service: General    COLOSTOMY      COLOSTOMY TAKEDOWN      HERNIA REPAIR      ORIF, ANKLE      PERCUTANEOUOSPINNING LOWER EXTREMITY       Family History   Problem Relation Age of Onset    Heart Disease Father      Social History     Socioeconomic History    Marital status:      Spouse name: Not on file    Number of children: Not on file    Years of education: Not on file    Highest education level: Not on file   Occupational History    Not on file   Tobacco Use    Smoking status: Former     Current packs/day: 0.00     Average packs/day: 1 pack/day for 10.0 years (10.0 ttl pk-yrs)     Types: Cigarettes     Start date: 1967     Quit date: 1977     Years since quittin.4    Smokeless tobacco: Never   Vaping Use    Vaping status: Never Used   Substance and Sexual Activity    Alcohol use: Yes     Alcohol/week: 4.2 oz     Types: 7 Standard drinks or equivalent per week     Comment: 2/day    Drug use: No    Sexual activity: Not on file   Other Topics Concern    Not on file   Social History Narrative    Not on file     Social Determinants of Health     Financial Resource Strain: Not on file   Food Insecurity: No Food Insecurity (2019)    Hunger Vital Sign     Worried About Running Out of Food in the Last Year: Never true     Ran Out of Food in the Last Year: Never true   Transportation Needs: No Transportation Needs (2019)    PRAPARE - Transportation     Lack of Transportation (Medical): No     Lack of Transportation (Non-Medical): No   Physical  "Activity: Not on file   Stress: Not on file   Social Connections: Not on file   Intimate Partner Violence: Not on file   Housing Stability: Not on file           Allergies/Intolerances:  Allergies   Allergen Reactions    Sulfa Drugs Rash     Rash, developed this back in 2015 after being placed on \"sulfa antibiotic for my wound\". Antibiotic was stopped and rash went away. Patient states he had a sulfa antibiotic prior to that time back when he was younger w/o a reaction.          ROS:   Review of Systems   Constitutional:  Negative for chills, fever, malaise/fatigue and weight loss.   HENT:  Negative for congestion and hearing loss.    Eyes:  Negative for blurred vision and double vision.   Respiratory:  Negative for cough, sputum production, shortness of breath and wheezing.    Cardiovascular:  Negative for chest pain and palpitations.   Gastrointestinal:  Negative for abdominal pain, nausea and vomiting.   Genitourinary:  Negative for dysuria.        Cazares catheter   Musculoskeletal:  Negative for myalgias.   Skin:  Negative for itching and rash.   Neurological:  Positive for focal weakness (Paraplegia). Negative for dizziness and headaches.   Endo/Heme/Allergies:  Does not bruise/bleed easily.   Psychiatric/Behavioral:  The patient is not nervous/anxious.       ROS was reviewed and were negative except as above.    Objective    Most Recent Vital Signs:  /65 Comment: wound care visit  Pulse 84 Comment: wound care visit  Temp 36.3 °C (97.4 °F) Comment: wound care visit  Ht 1.778 m (5' 10\")   Wt 97.5 kg (215 lb)   SpO2 95% Comment: wound care visit  BMI 30.85 kg/m²     Physical Exam:  Physical Exam  Vitals reviewed.   Constitutional:       Appearance: Normal appearance. He is obese.      Comments: Wheelchair   HENT:      Head: Normocephalic and atraumatic.      Nose: Nose normal.      Mouth/Throat:      Mouth: Mucous membranes are moist.   Eyes:      Pupils: Pupils are equal, round, and reactive to light. "   Cardiovascular:      Rate and Rhythm: Normal rate.   Pulmonary:      Effort: Pulmonary effort is normal.      Breath sounds: Normal breath sounds.   Abdominal:      Palpations: Abdomen is soft.   Genitourinary:     Comments: Cazares catheter  Musculoskeletal:         General: No tenderness.      Cervical back: Normal range of motion and neck supple.      Comments: Unable to visualize wound at but clinical wound photos uploaded and attached to the note below   Skin:     General: Skin is warm and dry.      Coloration: Skin is not jaundiced.      Findings: No erythema or rash.   Neurological:      Mental Status: He is alert and oriented to person, place, and time.      Motor: Weakness present.   Psychiatric:         Mood and Affect: Mood normal.         Behavior: Behavior normal.          Pertinent Lab/Imaging Results:  [unfilled]  @CMP@  WBC   Date/Time Value Ref Range Status   05/01/2024 04:21 PM 11.4 (H) 4.8 - 10.8 K/uL Final     RBC   Date/Time Value Ref Range Status   05/01/2024 04:21 PM 3.97 (L) 4.70 - 6.10 M/uL Final     Hemoglobin   Date/Time Value Ref Range Status   05/01/2024 04:21 PM 12.6 (L) 14.0 - 18.0 g/dL Final     Hematocrit   Date/Time Value Ref Range Status   05/01/2024 04:21 PM 39.8 (L) 42.0 - 52.0 % Final     MCV   Date/Time Value Ref Range Status   05/01/2024 04:21 .3 (H) 81.4 - 97.8 fL Final     MCH   Date/Time Value Ref Range Status   05/01/2024 04:21 PM 31.7 27.0 - 33.0 pg Final     MCHC   Date/Time Value Ref Range Status   05/01/2024 04:21 PM 31.7 (L) 32.3 - 36.5 g/dL Final     Comment:     Please note new reference range effective 05/22/2023.     MPV   Date/Time Value Ref Range Status   05/01/2024 04:21 PM 9.5 9.0 - 12.9 fL Final      Sodium   Date/Time Value Ref Range Status   05/01/2024 04:21  (L) 135 - 145 mmol/L Final     Potassium   Date/Time Value Ref Range Status   05/01/2024 04:21 PM 3.8 3.6 - 5.5 mmol/L Final     Chloride   Date/Time Value Ref Range Status   05/01/2024  "04:21 PM 96 96 - 112 mmol/L Final     Co2   Date/Time Value Ref Range Status   05/01/2024 04:21 PM 22 20 - 33 mmol/L Final     Glucose   Date/Time Value Ref Range Status   05/01/2024 04:21  (H) 65 - 99 mg/dL Final     Bun   Date/Time Value Ref Range Status   05/01/2024 04:21 PM 14 8 - 22 mg/dL Final     Creatinine   Date/Time Value Ref Range Status   05/01/2024 04:21 PM 0.68 0.50 - 1.40 mg/dL Final     Bun-Creatinine Ratio   Date/Time Value Ref Range Status   01/22/2024 06:47 AM 45.0 (H) 10.0 - 20.0 Final     Alkaline Phosphatase   Date/Time Value Ref Range Status   05/01/2024 04:21 PM 72 30 - 99 U/L Final     AST(SGOT)   Date/Time Value Ref Range Status   05/01/2024 04:21 PM 12 12 - 45 U/L Final     ALT(SGPT)   Date/Time Value Ref Range Status   05/01/2024 04:21 PM 5 2 - 50 U/L Final     Total Bilirubin   Date/Time Value Ref Range Status   05/01/2024 04:21 PM 0.4 0.1 - 1.5 mg/dL Final      CPK Total   Date/Time Value Ref Range Status   12/20/2023 03:45 AM 52 0 - 154 U/L Final          Blood Culture Hold   Date Value Ref Range Status   04/15/2023 Collected  Final       Blood Culture Hold   Date Value Ref Range Status   04/15/2023 Collected  Final       SURGICAL PATHOLOGY CONSULTATION      FINAL DIAGNOSIS:    A. Left ischium bone:         Bone fragment with degenerative changes and focal acute          osteomyelitis         Negative for malignancy                                        Diagnosis performed by:                                      ARMAND CASAREZ DO  ------------------------------------------------------------------------  ---------------------------------------------------------------  CODES: 2002x1  2205x1    SPECIMEN(S)  A. Left ischium bone:    PERTINENT CLINICAL INFORMATION:  Infected wound (T14.8 XXA, L8.9)     GROSS DESCRIPTION:  A. Received in formalin labeled with the patient's name, medical record  number, and \"left ischium\" are 2 irregular calcified fragments of  tissue measuring " 0.2-0.7 cm.  Decalcified in HCl. TS 1 /[id number] AGW    MICROSCOPIC DESCRIPTION:  Microscopic examination was performed.  Please see diagnosis.    Report electronically signed by:  ARMAND CASAREZ DO ,  Diagnosis performed at: [facility]  Pathology  Department, [address] [place] [address]  [id number]      Printed 00/00/0000  [id number] NICHOLAS CASEY   Page 1 of 1 MRN#:[id number]      Specimen Collected: 06/05/24  1:35 PM Last Resulted: 06/07/24 10:02 AM      ntains abnormal data CULTURE TISSUE W/ GRM STAIN  Order: [id number]   Status: Final result       Visible to patient: Yes (seen)       Next appt: Today at 04:30 PM in Infectious Diseases (Tin Ascencio A.P.R.N.)       Dx: Infected wound    Specimen Information: Bone   0 Result Notes      Component 2 wk ago   Significant Indicator POS Positive (POS)   Source BONE   Site LEFT ISCHIUM   Culture Result - Abnormal    Gram Stain Result Few Gram negative rods.   Culture Result  Abnormal   Escherichia coli  Moderate growth    Culture Result  Abnormal   Proteus mirabilis ESBL  Moderate growth  Extended Spectrum Beta-lactamase (ESBL) isolated.  ESBL's may be clinically resistant to therapy with  Penicillins,Cephalosporins or Aztreonam despite  apparent in vitro susceptibility to some of these agents.  The patient requires contact isolation.  Please contact pharmacy or an Infectious Disease Specialist  if you have any questions about appropriate therapy.    Culture Result  Abnormal   Beta Streptococcus Group G  Light growth    Resulting Agency M        Susceptibility     Escherichia coli Proteus mirabilis esbl     ROSE ROSE     Amoxicillin/CA <=8/4 mcg/mL Sensitive       Ampicillin <=8 mcg/mL Sensitive >16 mcg/mL Resistant     Ampicillin/sulbactam <=4/2 mcg/mL Sensitive 8/4 mcg/mL Sensitive     Cefazolin <=2 mcg/mL Sensitive >16 mcg/mL Resistant     Cefepime <=2 mcg/mL Sensitive >16 mcg/mL Resistant     Ceftriaxone <=1 mcg/mL Sensitive >32 mcg/mL Resistant      Cefuroxime <=4 mcg/mL Sensitive >16 mcg/mL Resistant     Ciprofloxacin <=0.25 mcg/mL Sensitive 1 mcg/mL Resistant     Ertapenem <=0.5 mcg/mL Sensitive <=0.5 mcg/mL Sensitive     Gentamicin <=2 mcg/mL Sensitive <=2 mcg/mL Sensitive     Levofloxacin <=0.5 mcg/mL Sensitive 1 mcg/mL Intermediate     Minocycline <=4 mcg/mL Sensitive >8 mcg/mL Resistant     Moxifloxacin <=2 mcg/mL Sensitive >4 mcg/mL Resistant     Pip/Tazobactam <=8 mcg/mL Sensitive <=8 mcg/mL Sensitive     Tigecycline <=2 mcg/mL Sensitive       Tobramycin <=2 mcg/mL Sensitive <=2 mcg/mL Sensitive     Trimeth/Sulfa <=0.5/9.5 m... Sensitive >2/38 mcg/mL Resistant                    Narrative  Performed by: DORY  Left Ischium      Specimen Collected: 06/05/24  1:35 PM Last Resulted: 06/08/24  2:41 PM                Impression/Assessment      1. Pressure ulcers of skin of multiple topographic sites        2. History of infection due to extended spectrum beta lactamase (ESBL) producing bacteria        3. Acute hematogenous osteomyelitis of multiple sites (HCC)  IR-PICC LINE PLACEMENT W/ GUIDANCE > AGE 5    CBC WITH DIFFERENTIAL    Comp Metabolic Panel      4. Quadriplegia, C5-C7 complete (HCC)          Osvaldo Pate is a 73 y.o. 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   6 weeks of IV antibiotics for sacral osteomyelitis in 3/23/22. He was hospitalized from 4/22 until 4/27/2022 and underwent surgery with Dr. Raman on 4/26 for irrigation and debridement of multiple compartments of the left lower extremity, bone excision, and complex closure of chronic wound using biologic skin substitute.  Patient also with history of ESBL infection in urine noted in 4/2023. s/p debridement necrotic muscle and bone 12/12/23. Operative cultures 12/12/23 ESBL  Proteus (also fluoroquinolone and Bactrim resistant), pan susceptible Enterococcus, Prevotella. Completed 6 weeks of IV ertapenem  + IV daptomycin on 1/22/2024.  Patient continued to follow-up with wound care on a regular basis.  Most recently on 6/5/2024 patient followed up with wound care and was noted to have Left ischial wound with exposed bone and with few small bone fragments and some slough. He does report copious drainage from left ischial wound.  Wound team obtained culture and also sent a small dislodged component of bone to pathology.  Pathology results positive for acute osteomyelitis, negative for malignancy. Cultures +  Proteus mirabilis esbl, Escherichia coli (pan sensitivity), and group G strep.  Referred to ID clinic for antibiotic therapy      06/19/24- Today Patient reports feeling well.  Pt being followed by the [facility]. Wound pictures reviewed in EPIC. Denies drainage, pungent odor, redness, pain. Denies feeling generally ill, fevers/chills, general malaise, headache, n/v/d.  Bilateral pelvic wounds.  See clinical photo attached below.  After further review of cultures will start the patient on IV ertapenem 1 g every 24 hours, 6-week course with end date tentatively for 8/3/2024 based upon when PICC line and antibiotic therapy is started.  Most likely will alter end date.  Orders placed for home infusion but due to patient's insurance covered may need to go into infusion center.  Patient states that he is able to drive and once daily to infusion center and if needed but would prefer home infusion if possible.  Orders to be faxed to outpatient infusion center for price quote and will update patient.  Medication education provided.  Weekly labs CBC/CMP while on IV antibiotics.  Repeat labs today CBC/CMP and has recent INR for PICC line placement.  Continue wound care on a regular basis.    -At risk for worsening wound infection, sepsis, hospital admissions, and death  PLAN:   - Orders  for PICC line placement.  - IV ertapenem 1 g daily, 6-week course, end date 8/2/24   - Repeat Labs CBC/CMP weekly while receiving IV antibiotics  - CBC/CMP today for PICC line placement  - Medication education provided and S/S of side effects discussed   - Recommend routine follow up with wound care and orthopedic surgery  - Continue wound care to bilateral pelvis  - Recommended to start po probiotic  - Orders placed for home IV infusion but depending on cost may need to go to outpatient infusion center  - Education provided on S/S of infection and when to report to ER/ call 911           Return visit: 1 month. Follow up with primary care physician for chronic medical problems      I have performed a physical exam,  updated ROS and plan today. I have reviewed previous images, labs, and provider notes.      [facility].    All Patients should seek medical re-evaluation or report to the ER for new, increasing or worsening symptoms. In some circumstances medical conditions can change from the initial evaluation and may require emergent medical re-evaluation. This includes but is not limited to chest pain, shortness of breath, atypical abdominal pain, atypical headache, ALOC, fever >101, low blood pressure, high respiratory rate (above 30), low oxygen saturation (below 90%), acute delirium, abnormal bleeding, inability to tolerate any intake, weakness on one side of the body, any worsened or concerning conditions.    Please note that this dictation was created using voice recognition software. I have worked with technical experts from [facility] to optimize the interface.  I have made every reasonable attempt to correct obvious errors, but there may be errors of grammar and possibly content that I did not discover before finalizing the note.

## 2024-06-19 NOTE — PROGRESS NOTES
Provider Encounter- Pressure Injury        HISTORY OF PRESENT ILLNESS  Wound History:    START OF CARE IN CLINIC: 1/31/2024 (return to clinic after hospitalization)    REFERRING PROVIDER: Racquel York       WOUNDS-superior coccyx pressure injury, stage IV                                 Coccyx pressure injury, stage IV           Inferior coccyx pressure injury, stage IV                                 Right ischial pressure injury, stage IV                                 Left heel pressure injury, recurring stage III                                 Left posterior lower leg/calf-stage III                                 Left medial lower leg surgical wound dehiscence-resolved, reopens frequently            Left ischial pressure injury, stage IV-first observed in clinic 5/1/2024          HISTORY: Patient with history of incomplete quadriplegia referred to [facility] for treatment of a stage IV pressure injury.  He has a history of previous pressure injuries to this area, and underwent muscle flaps in 2019, and then again in 2020.  He was seen in the wound clinic in November 2021 for an ulcer proximal from his current ulcer, and pressure injuries to his left posterior lower leg and left heel.  At that time, it was discovered that the patient had retained VAC foam embedded in the wound bed of the sacral wound.  Attempts were made to get him back to his plastic surgeon, though unsuccessful.  In January he underwent surgical removal of VAC sponge along with excisional debridement of his sacral wound by Dr. Chaves.  After the surgery, his wound went on to heal without incident.   In early April 2022, his home health nurse noted a new sacral ulcer, below the previous ulcer which quickly tripled in size over the following weeks.  The ulcer to his left medial lower leg had also deteriorated, with bone visible at the base..  He was hospitalized from 4/22 until 4/27/2022 and underwent surgery with Dr. Raman on 4/26 for  irrigation and debridement of multiple compartments of the left lower extremity, bone excision, and complex closure of chronic wound using biologic skin substitute.   His sacrococcygeal wound was not surgically addressed during this admission.  He was discharged back to his group home, with home health, and referral to outpatient wound clinic for his sacral wound.  He was instructed to follow-up with his surgeon for his lower leg wound.       Postoperatively, the left medial lower leg incision dehisced.  He was seen by his surgeon at [facility] on 5/11.  The surgeon opted to leave remaining sutures in place, and refer him to the wound clinic for treatment of this wound.   Treatment of this wound was initiated in clinic on 5/12.  During this visit was also noted that his heel DTI had resolved, but that he had a new pressure injury to his left posterior lower extremity.     A new pressure injury was noted to patient's right upper buttock/lower back on 5/20/2022.  Wound was linear in shape, skin discolored but intact.     Abrasion noted to left anterior lower leg.  First observed in clinic on 7/22/2022.  Patient states he bumped his leg into his food tray.     Small DTI noted to patient's left lateral lower leg on 7/29/2022.  Skin intact but discolored.     Large area of deep tissue injury noted to patient's left exterior lower leg.  Patient denied any trauma to this area.  Skin intact.  Wound documented.    1/27/2023: Patient was admitted to [facility] from 1/23-1/25/2023 with gross hematuria. He underwent RICHARD which showed watchman device was in place and he was taken off of Xarelto. While hospitalized wound team was consulted. He was referred back to [facility] and home health upon discharge.    Patient was hospitalized at [facility] for pyelonephritis from 2/26 until 3/2/2023, admitted for fever and general malaise.  He was admitted and initially started on linezolid and meropenem for suspected UTI and history of multidrug-resistant  organisms.  Urine cultures were negative. ID was consulted, recommended CT of chest and abdomen,which were negative for acute findings. However, he was treated with 5 days total course of antibiotics for suspected UTI, and symptoms completely resolved.  During this admission, the inpatient wound team was consulted for treatment of his sacral and lower leg wounds.  A wound culture was taken from his left heel pressure ulcer, negative.  Once stabilized, he was discharged home and referred back to [facility] to resume treatment of his wounds.    Patient was hospitalized at [facility] from 12/11 until 12/23/2023, admitted for fever.  Wound infection suspected.  CT scan of abdomen and pelvis for evaluation of sacral pressure injury showed gas tracking down to the bone consistent with osteomyelitis.  He underwent I&D of right ischial ulcer (documented as buttock) with Dr. Bansal, medial tract leading to an abscess was identified.  Cavity was opened allowing it to drain into the main wound bed.  Wound VAC was placed and managed by wound team during this admission.  A bone scan of patient's left foot was also done, initially concerning for osteomyelitis.  Orthopedic surgery was consulted and did not recommend surgical intervention. ID consulted also, recommended the patient to receive IV ertapenem 1 g every 24 hours plus IV daptomycin 8 mg/kg every 24 hours through 1/22/2024.   He was discharged to LTAC on 1/23 for IV antibiotics and wound care.  From the LTAC he was discharged home on 1/22 with home health and referral back to [facility] to resume management of his wounds  .      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  Never used smokeless tobacco    Patient's problem list, allergies, and current medications reviewed and updated in Epic    Interval History:  Interval History thinned 7/29/2022.  Please see previous notes for complete interval history.   Interval History thinned 1/27/2023. Please see previous note for complete interval history.  Interval History thinned 3/3/2023.  Please see previous notes for complete interval history.    Interval History thinned 8/4/2023.  Please see previous notes for complete interval history.    Interval History thinned 1/31/2024.  Please see previous notes for complete interval history.      1/31/2024 Initial clinic visit with ADILSON Arthur, HOLDEN, CWDINESHN, CFCN.   Patient returns to clinic after hospitalization and LTAC stay.  VAC in place to right ischial wound.  Sacral wounds have progressed significantly since he was last seen in clinic.  Left medial wound has healed, though covered with friable tissue.  Left heel ulcer, previously resolved has reopened slightly.  He states that he is feeling well overall, denies fevers, chills, nausea, vomiting, cough or shortness of breath.     2/7/2024: Clinic visit with Steve Hodges MD. Patient reports feeling in normal state of health. Patient denies any alarms from wound VAC, however today he presented with significant odor from ischial wound and dressing was partially avulsed leaving in adequate suction. Machine was checked and functioning well, there were no recorded alarms.    2/16/24: Clinic visit with Dana DE ANDA, HOLDEN, CWON, CFCN.  Pt denies fevers, chills, nausea, vomiting.  Left shin fluctuance to wound with hematoma and dark sanguinous drainage.  Bone fragment removed during debridement.  Obtain x-ray as residual bone palpated.  [facility].  Coccyx wound has decreased from 3 ulcers to now 1.  Right ischial wound with slough, odor.  Dakin soaked performed during visit.  Wound  debrided.  Able to continue VAC postdebridement.     2/21/2024: Clinic visit with Steve Hodges MD. Patient reports doing ok, reports feeling well. Left medial lower extremity wound is measuring larger with thick slough and friable tissue. Xray completed today, will undoubtedly demonstrate known OM. Patient denies any issues with wound VAC. Home health has been soaking Ischial wound with Dakins prior to applying wound VAC.    2/28/2024: Clinic visit with Steve Hodges MD. Patient recently tested positive for COVID. Reports some congestion, cough, and brain fog. Denies other symptoms. Patient with new wounds to left lower extremity undoubtedly associated with known underlying OM. Patient's sacral and ischial wound appear to be improving, measuring smaller.    3/6/2024: Clinic visit with Steve Hodges MD. Patient reports feeling much better, COVID symptoms have resolved. Patient's left lower extremity wounds are stable, posterior leg wounds appear resolved. Patient sacral wound is stable and ischial wound is improving.    3/13/2024 : Clinic visit with ADILSON Arthur, FNP-BC, CWOCN, CFCN.   Patient states he is feeling well, offers no complaints.  Left lower extremity wounds continue to wax and wane.  Sacral and ischial wounds slowly progressing.    3/20/2024: Clinic visit with Steve Hodges MD. Patient reports doing ok. Denies any acute issues. Leg wounds continue to wax and wane. He reports there was more slough and increased odor from ischial wound so home health packed with dakins prior to applying VAC. No odor today in clinic.    3/27/2024: Clinic visit with Steve Hodges MD. Patient reports doing ok. Unfortunately home health did not pre-drape bridging the trac pad from wound VAC and right buttocks / hip skin is irritated and at risk of breaking down. Left leg wounds have improved. Sacral and ischial pressure injuries are stable.    4/3/2024: Clinic visit with Steve Hodges MD. Patient reports  doing well, denies any acute issues. Leg wounds are all improving, few new pinpoint areas of skin breakdown. Sacral and ischial pressure injuries slightly improved. Skin on right buttocks and hip has improved and did not fully breakdown.    4/10/2024 : Clinic visit with ADILSON Arthur, HOLDEN, IMAN, LILIYA.   Patient continues to feel well.  Today, all of his left lower extremity wounds are healed, though historically have tended to open and close.  Sacral and ischial wounds both measure bit larger today.    4/17/2024 : Clinic visit with ADILSON Arthur, HOLDEN, LILIYA VALERIO.   Anjum states he is feeling very well.  Left lower extremity wounds remain closed.  Sacral/coccyx ulcers and right ischial ulcer continue to wax and wane.      4/24/2024 : Clinic visit with ADILSON Arthur, HOLDEN, LILIYA VALERIO.   Anjum states he is feeling well.  He is left medial lower leg wound, and left heel wounds have reopened slightly, neither of which required excisional debridement today.  Sacral and ischial wounds are slowly progressing.   He has not yet obtained quarter strength Dakin's for soaks with VAC dressing change.  He decided to order from [employer].  However, I also sent an order to [facility] last week.    5/1/2024 : Clinic visit with ADILSON Arthur, HOLDEN, MIAN, LILIYA.   Anjum is not feeling well, very fatigued.  He is again COVID-positive, was in the ED 2 nights ago with fever and chills.  Chest x-ray showed no evidence of pneumonia.  A CT of his abdomen and pelvis was also done, showed apparent bone resorption of the ischial tuberosity, suggesting osteomyelitis-this is not a new finding.  Patient was treated for ischial OM in December 2023 and January of this year.  He was discharged home on cefdinir.   Today he is afebrile, but states he has been extremely fatigued.  He admits to sitting in his wheelchair more than usual.  He does have a new pressure injury to his left ischium, treatment initiated in  "clinic today.    5/8/2024: Clinic visit with Steve Hodges MD. Patient reports feeling much better, reports has recovered from COVID and feeling in normal state of health. His sacral wound has resolved. Right ischial wound measuring smaller. Left ischial wound largely unchanged with thick adherent slough.    5/15/2024: Clinic visit with Steve Hodges MD. Patient reports feeling in normal state of health. He received a letter from medicare denying appeal to continue wound VAC. Clinic staff looking into this as he has been making progress with VAC use, appears that order to continue NPWT was sent to different facility. I have signed order to continue NPWT. Patient continues to develop new pressure injuries and worsening of left ischial wound. He has a roho wheelchair cushion, but with new wounds this may be inadequate. Will order new seating eval from [employer].    5/22/2024 : Clinic visit with ADILSON Arthur, HOLDEN, IMAN, LILIYA.   Patient states he is feeling well overall.  His left ischial ulcer has deteriorated, increased necrotic tissue.  He states that he has been sitting more than usual while a friend of his was visiting from out of town.  He has heard from [employer], and there will be someone coming to his home to do a seating eval in the next few weeks.      5/29/2024 : Clinic visit with ADILSON Arthur, HOLDEN, IMAN, CFTRACI.   Turk presents today with significant deterioration of his ischial wounds.  The left wound in particular is markedly worse with increased slough and odor.  States he has not been spending more time up in his wheelchair than usual.  He has not heard from [employer] yet regarding seating eval.  I contacted Pinky Gary at [employer] to find out when his seating eval would be completed.  Was informed that someone would be up to see him next week.   Patient states he cannot have MRI because he has, \"ferrous metal\" in his body, placed in the 1970s.  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  Will start with x-ray of pelvis, will have [facility] do this in his living facility.   In the meantime, I counseled patient to minimize time up in wheelchair, and to make sure that while in bed he is to make sure that pelvis is tilted when head of bed is above 30 degrees.    6/5/2024: Clinic visit with Steve Hodges MD. Patient reports feeling ok, denies any fevers. He was started on Abx last week, was only able to obtain on Monday and started taking. Left ischial wound noted now to have exposed bone with few small bone fragments and some slough. He does report copious drainage from left ischial wound, home health has had to come out more frequently this week to change dressings due to saturation. With new exposed bone concern for OM, will send bone fragments for culture and path. He has Xray scheduled later today. Patient has not heard from [facility], I called [facility] today to see about status of seating evaluation.    6/12/2024: Clinic visit with Steve Hodges MD. Patient reports that he feels in normal state of health. Denies any symptoms of systemic infection. Patient's left ischial wound measuring larger and still with exposed bone. Reinforced results from last weeks workup confirms acute OM polymicrobial and concerning with ESBL Proteus. He has appointment with ID next week. Discussed with pharmacy, continuing Augmentin and will add Levaquin as appears to have some intermediate coverage to ESBL proteus. As OM is currently being treated will start wound VAC to left ischium.    6/19/2024: Clinic visit with Steve Hodges MD. Patient denies any fevers or chills. Reports he is tolerating Abx. He has appointment with ID later today to discuss OM treatment. He is tolerating wound VAC. Slight bone exposed bilaterally in ischial wounds, slightly worse.    REVIEW OF SYSTEMS:   Unchanged from previous wound clinic assessment on  6/12/2024, except as noted in interval history above.      PHYSICAL EXAMINATION:   /65   Pulse 84   Temp 36.3 °C (97.4 °F) (Temporal)   Resp 18   SpO2 95%   Physical Exam  Constitutional:       Appearance: He is obese.   Cardiovascular:      Rate and Rhythm: Normal rate.   Pulmonary:      Effort: Pulmonary effort is normal.   Abdominal:      Comments: Colostomy left lower quadrant   Genitourinary:     Comments: Suprapubic catheter to down drain   Skin:     Comments: Stage IV coccygeal pressure injury -remains resolved    Stage IV pressure injury to right ischium- Wound slightly larger. No areas of granulation.  Moderate serosanguineous drainage.  No odor noted.  No periwound erythema or induration    Full-thickness wound to left medial lower leg, surgical wound dehiscence- Wound appears resolved    Stage III pressure injury left posterior heel -remains resolved, covered with fragile epithelium    Stage III pressure injury to posterior left lower leg- remains resolved with area of discoloration, skin intact    Stage IV pressure injury of left ischium- Wound measures larger, continues to have palpable bone. Less slough. Heavy serosanguineous drainage, odor has improved. No periwound erythema or induration.     Neurological:      Mental Status: He is alert and oriented to person, place, and time.   Psychiatric:         Mood and Affect: Mood normal.         WOUND ASSESSMENT  Wound 12/12/23 Pressure Injury Ischium Right (Active)   Wound Image    06/19/24 1600   Site Assessment Red;White 06/19/24 1600   Periwound Assessment Excoriated;Blistered;Scar tissue 06/19/24 1600   Margins Unattached edges 06/19/24 1600   Drainage Amount Large 06/19/24 1600   Drainage Description Serosanguineous 06/19/24 1600   Treatments Cleansed;Provider debridement;Site care 06/19/24 1600   Offloading/DME Other (comment) 03/27/24 1625   Wound Cleansing Hypochlorus Acid 06/19/24 1600   Periwound Protectant No-sting Skin Prep;Drape  06/19/24 1600   Dressing Changed Changed 06/05/24 1300   Dressing Cleansing/Solutions Not Applicable 06/19/24 1600   Dressing Options Wound Vac;Offloading Dressing - Sacral 06/19/24 1600   Dressing Change/Treatment Frequency Monday, Wednesday, Friday, and As Needed 06/19/24 1600   Wound Team Following Weekly 06/05/24 1300   WOUND NURSE ONLY - Pressure Injury Stage 4 06/19/24 1600   Non-staged Wound Description Not applicable 05/08/24 1500   Wound Length (cm) 3.5 cm 06/19/24 1600   Wound Width (cm) 3.2 cm 06/19/24 1600   Wound Depth (cm) 1.5 cm 06/19/24 1600   Wound Surface Area (cm^2) 11.2 cm^2 06/19/24 1600   Wound Volume (cm^3) 16.8 cm^3 06/19/24 1600   Post-Procedure Length (cm) 3.5 cm 06/19/24 1600   Post-Procedure Width (cm) 3.3 cm 06/19/24 1600   Post-Procedure Depth (cm) 1.5 cm 06/19/24 1600   Post-Procedure Surface Area (cm^2) 11.55 cm^2 06/19/24 1600   Post-Procedure Volume (cm^3) 17.325 cm^3 06/19/24 1600   Wound Healing % 70 06/19/24 1600   Wound Bed Granulation (%) 100 % 03/06/24 1000   Tunneling (cm) 0 cm 06/12/24 1527   Tunneling Clock Position of Wound 2 03/06/24 1000   Undermining (cm) 4 cm 06/19/24 1600   Undermining of Wound, 1st Location From 1 o'clock;To 1 o'clock 06/19/24 1600   Undermining (cm) - 2nd location 1.5 cm 06/05/24 1300   Undermining of Wound, 2nd Location From 6 o'clock;To 8 o'clock 06/05/24 1300   Wound Odor None 06/19/24 1600   Exposed Structures None 06/19/24 1600   Number of days: 191       Wound 02/16/24 Full Thickness Wound Leg Medial Left (Active)   Wound Image   06/19/24 1600   Site Assessment Scabbed;Fragile 06/19/24 1600   Periwound Assessment Hyperpigmented;Fragile;Edema 06/19/24 1600   Margins Attached edges 05/29/24 1600   Drainage Amount None 06/19/24 1600   Drainage Description Serosanguineous 06/05/24 1300   Treatments Cleansed;Site care 06/19/24 1600   Wound Cleansing Foam Cleanser/Washcloth 06/19/24 1600   Periwound Protectant No-sting Skin Prep 06/19/24 1600    Dressing Cleansing/Solutions Not Applicable 06/19/24 1600   Dressing Options [place];Offloading Dressing - Heel 06/19/24 1600   Dressing Change/Treatment Frequency Monday, Wednesday, Friday, and As Needed 06/19/24 1600   Wound Team Following Weekly 06/05/24 1300   Non-staged Wound Description Not applicable 06/19/24 1600   Wound Length (cm) 0.3 cm 05/29/24 1600   Wound Width (cm) 0.3 cm 05/29/24 1600   Wound Depth (cm) 0.3 cm 05/29/24 1600   Wound Surface Area (cm^2) 0.09 cm^2 05/29/24 1600   Wound Volume (cm^3) 0.027 cm^3 05/29/24 1600   Post-Procedure Length (cm) 0.3 cm 04/24/24 1530   Post-Procedure Width (cm) 0.3 cm 04/24/24 1530   Post-Procedure Depth (cm) 0.1 cm 04/24/24 1530   Post-Procedure Surface Area (cm^2) 0.09 cm^2 04/24/24 1530   Post-Procedure Volume (cm^3) 0.009 cm^3 04/24/24 1530   Wound Healing % 99 05/29/24 1600   Tunneling (cm) 0 cm 06/19/24 1600   Undermining (cm) 0 cm 06/19/24 1600   Wound Odor None 06/19/24 1600   Exposed Structures None 06/19/24 1600   Number of days: 125       Wound 05/01/24 Pressure Injury Ischium Left (Active)   Wound Image    06/19/24 1600   Site Assessment Red;Yellow;Slough 06/19/24 1600   Periwound Assessment Blanchable erythema;Fragile 06/19/24 1600   Margins Unattached edges 06/19/24 1600   Closure Secondary intention 05/08/24 1500   Drainage Amount Large 06/19/24 1600   Drainage Description Serosanguineous 06/19/24 1600   Treatments Cleansed;Provider debridement 06/19/24 1600   Wound Cleansing Hypochlorus Acid 06/19/24 1600   Periwound Protectant No-sting Skin Prep;Hydrocolloid;Drape 06/19/24 1600   Dressing Status Intact 05/01/24 1600   Dressing Changed New 06/05/24 1300   Dressing Cleansing/Solutions Not Applicable 06/19/24 1600   Dressing Options Wound Vac;Offloading Dressing - Sacral 06/19/24 1600   Dressing Change/Treatment Frequency Monday, Wednesday, Friday, and As Needed 06/19/24 1600   Wound Team Following Weekly 06/05/24 1300   WOUND NURSE ONLY -  "Pressure Injury Stage 4 06/19/24 1600   Non-staged Wound Description Full thickness 06/05/24 1300   Wound Length (cm) 7 cm 06/19/24 1600   Wound Width (cm) 4 cm 06/19/24 1600   Wound Depth (cm) 4.4 cm 06/19/24 1600   Wound Surface Area (cm^2) 28 cm^2 06/19/24 1600   Wound Volume (cm^3) 123.2 cm^3 06/19/24 1600   Post-Procedure Length (cm) 7 cm 06/19/24 1600   Post-Procedure Width (cm) 4.1 cm 06/19/24 1600   Post-Procedure Depth (cm) 4.4 cm 06/19/24 1600   Post-Procedure Surface Area (cm^2) 28.7 cm^2 06/19/24 1600   Post-Procedure Volume (cm^3) 126.28 cm^3 06/19/24 1600   Wound Healing % -[id number] 06/19/24 1600   Tunneling (cm) 0 cm 06/19/24 1600   Undermining (cm) 3.5 cm 06/19/24 1600   Undermining of Wound, 1st Location From 9 o'clock;To 3 o'clock 06/19/24 1600   Wound Odor Mild 06/19/24 1600   Exposed Structures Tendon;Bone 06/19/24 1600   Number of days: 50       PROCEDURE: Excisional debridement of right and left ischial wounds  -2% viscous lidocaine applied topically to wound bed for approximately 5 minutes prior to debridement  -Curette used to debride wound beds.  Excisional debridement was performed to remove devitalized tissue until healthy, bleeding tissue was visualized.   Entire surface of wounds, 40.25 cm2 debrided.  Tissue debrided into the muscle / fascia layer.    -Bleeding controlled with manual pressure.    -Wound care completed by wound RN, refer to flowsheet  -Patient tolerated the procedure well, without c/o pain or discomfort.    Pertinent Labs and Diagnostics:    Labs:     A1c: No results found for: \"HBA1C\"     [facility] results, 7/1/2022 (under media tab)    CRP 13    ESR 31      IMAGING:     X-ray left tib-fib ordered 2/16/2024 through [facility]    12/11/2023-CT of abdomen pelvis with contrast  IMPRESSION:   1.  Right ischial decubitus ulcer extending to bone with soft tissue gas tracking along the right perineum. Appearance suggesting osteomyelitis, consider component of necrotizing " fasciitis as clinically appropriate.  2.  Small pericardial effusion  3.  Left adrenal nodule, density on prior noncontrast CT demonstrates adenoma.  4.  Hepatomegaly  5.  Enlarged prostate, workup and evaluation for causes of prostate enlargement recommended as clinically appropriate.  6.  Atherosclerosis and atherosclerotic coronary artery disease    12/15/2023-bone scan of left foot  IMPRESSION:     1.  Mild increased activity in the LEFT 1st and 3rd toes on blood pool and delayed images possibly indicating inflammation/infection.  2.  No significant blood flow asymmetry.          VASCULAR STUDIES: No results found.    LAST  WOUND CULTURE:   Lab Results   Component Value Date/Time    CULTRSULT Moderate growth mixed enteric ade. (A) 06/05/2024 01:40 PM    CULTRSULT Bacteroides ovatus group  Moderate growth   (A) 06/05/2024 01:40 PM    CULTRSULT Beta Streptococcus Group C  Moderate growth   (A) 06/05/2024 01:40 PM      PATHOLOGY  2/17/2023-bone fragment extracted from left lower extremity wound\\  FINAL DIAGNOSIS:     A. Left leg bone fragment at base of chronic wound:          Extensively degenerated fibrocartilaginous tissue with a rim of           fibrinopurulent debris          Correlate with culture findings            Comment: While no residual intact bone is identified, these           findings are suggestive of adjacent osteomyelitis.      ASSESSMENT AND PLAN:     1. Sacral decubitus ulcer, stage IV (HCC)  Comments: Ulcer first noted in early April 2022 as small open area which quickly enlarged.  This ulcer is present distal from previous sacral ulcer which healed after surgery in January.  Patient has history of flap reconstruction x2 to this area.    6/19/2024: Remains resolved.  High risk for recurrence  -Patient to return to clinic weekly for assessment and debridement  -Patient does spend a lot of time up in his wheelchair, and wishes to continue to do so for his quality of life.  He lives  independently, drives, and is involved with family activities.  He does have a [employer] wheelchair cushion, suspect may be inadequate. [facility].  - Known OM that was previously treated. CT scan done during recent hospitalization did not show OM of sacrum, however OM of the ischium noted.  -Monitor site each clinic visit    Wound care: Silicone foam pressure relieving dressing    2. Pressure injury of right ischium, stage 4 ([facility])  Comments: Abscess and OM found on CT during hospitalization in December 2023.  Patient underwent I&D with VAC placement.  IV antibiotics through 1/22/2024.    6/19/2024: Wound measuring larger  - Excisional debridement of nonviable tissue from wound in clinic today, medically necessary to promote wound healing   -Home health has been instructed to continue Dakins soaked gauze to wound for  15 minutes prior to placement of new VAC dressing as needed for odor / slough.  -Continue wound VAC to right ischial wound  -Patient to return to clinic weekly for assessment and debridement  -Patient states that he is up in his wheelchair for 8 to 10 hours/day, repositions frequently, and is using capabilities of his wheelchair to reposition back and seat  -Home health to change dressing 2 times per week in between clinic visits.  Dakin's soaks for 15 minutes each dressing change  -Patient is very well versed on pressure relief measures, has adequate surface on bed, alternating low air loss.  -Seating eval ordered through [employer] on previous visit.  [facility] and [facility] planned on seating eval today. He has not heard from them. I called Arthur today and left a message.    Wound care:  NPWT at -125 mmHg to accelerate granulation, change 3 times per week, sacral offloading foam.    3. Pressure injury of left ischium, stage 4 ([facility])    6/19/2024: Wound measuring larger, continues to have exposed bone. Heavy serous  drainage.  -Excisional debridement of wound in clinic today, medically necessary to promote wound healing.  - Pathology on bone fragment was positive for acute OM  - Patient to return to clinic weekly for assessment and debridement  - Continue NPWT    Wound care: NPWT at -125 mmHg to accelerate granulation, change 3 times per week, sacral offloading foam.    4. Other acute osteomyelitis, other site ([facility])    6/19/2024  - Bone fragments debrided and sent for pathology confirms acute OM  - Bone and wound culture was polymicrobial, most concerning a MDR ESBL Proteus  - Patient has established with ID. Plan for 6 weeks of IV Abx therapy with home infusions.    5. Postoperative wound dehiscence, subsequent encounter  6. Osteomyelitis of left lower extremity ([facility])  Comments: On 4/26/2022 patient underwent irrigation and debridement of multiple compartments of the left lower extremity states for pressure injury, with bone excision, and complex closure of chronic wound using biologic skin substitute.  During postop visit in surgeons office on 5/11, surgical site was noted to be dehisced.      6/19/2024: Wound appears covered with fragile epithelium. Wound has waxed and waned over the course of treatment.  Known osteomyelitis.  Patient has declined amputation  -No excisional debridement of these wounds required today  -Patient to be mindful of offloading when supine, should assure that his leg is not rotating medially  Wound care: Silicone foam dressing, Tubigrip D    8. Pressure injury of left foot, stage 4 ([facility])  9. Pressure injury of left leg, stage 3 ([facility])  10. Pressure injury of left heel, stage 3 ([facility])    6/19/2024  - Wounds appear resolved  - High risk for reoccurrence  - Continue to monitor  - Patient aware of offloading.    11. Quadriplegia, C5-C7 incomplete ([facility])  Comments: Complicating factor.  Impaired mobility and sensation  -Patient is still spending 7-8 hours/day up in his wheelchair, he has Roho cushion nearly  5 years old, and knows to reposition frequently.  Wears heel float boots bilaterally at all times  Mobility Evaluation:  -Will refer patient to [facility] for Mobility Evaluation with chief complaint of mobility limitations that interfere with daily living activities. Patient would benefit from repeat seating evaluation and custom seat cushion as he continues to develop pressure injuries to b/l ischium which is likely secondary to inadequate seat cushion. I strongly recommend evaluation for a custom seat cushion to limit risk of pressure injuries.  -Diagnosis that limits mobility: Incomplete quadriplegia who is wheelchair dependent.    -Type of mobility device prescribed: Seating evaluation and custom seat cushion.    Please note that this note may have been created using voice recognition software. I have worked with technical experts from [employer] to optimize the interface.  I have made every reasonable attempt to correct obvious errors, but there may be errors of grammar and possibly content that I did not discover before finalizing the note.

## 2024-06-20 ENCOUNTER — HOSPITAL ENCOUNTER (OUTPATIENT)
Dept: LAB | Facility: MEDICAL CENTER | Age: 74
End: 2024-06-20
Attending: INTERNAL MEDICINE
Payer: MEDICARE

## 2024-06-20 LAB
25(OH)D3 SERPL-MCNC: 35 NG/ML (ref 30–100)
ALBUMIN SERPL BCP-MCNC: 3.5 G/DL (ref 3.2–4.9)
ALBUMIN/GLOB SERPL: 0.7 G/DL
ALP SERPL-CCNC: 74 U/L (ref 30–99)
ALT SERPL-CCNC: 6 U/L (ref 2–50)
ANION GAP SERPL CALC-SCNC: 10 MMOL/L (ref 7–16)
AST SERPL-CCNC: 9 U/L (ref 12–45)
BASOPHILS # BLD AUTO: 0.3 % (ref 0–1.8)
BASOPHILS # BLD: 0.02 K/UL (ref 0–0.12)
BILIRUB SERPL-MCNC: 0.4 MG/DL (ref 0.1–1.5)
BUN SERPL-MCNC: 11 MG/DL (ref 8–22)
CALCIUM ALBUM COR SERPL-MCNC: 9.7 MG/DL (ref 8.5–10.5)
CALCIUM SERPL-MCNC: 9.3 MG/DL (ref 8.4–10.2)
CHLORIDE SERPL-SCNC: 105 MMOL/L (ref 96–112)
CO2 SERPL-SCNC: 26 MMOL/L (ref 20–33)
CORTIS SERPL-MCNC: 12.3 UG/DL (ref 0–23)
CREAT SERPL-MCNC: 0.57 MG/DL (ref 0.5–1.4)
EOSINOPHIL # BLD AUTO: 0.21 K/UL (ref 0–0.51)
EOSINOPHIL NFR BLD: 2.9 % (ref 0–6.9)
ERYTHROCYTE [DISTWIDTH] IN BLOOD BY AUTOMATED COUNT: 60.5 FL (ref 35.9–50)
GFR SERPLBLD CREATININE-BSD FMLA CKD-EPI: 103 ML/MIN/1.73 M 2
GLOBULIN SER CALC-MCNC: 4.8 G/DL (ref 1.9–3.5)
GLUCOSE SERPL-MCNC: 102 MG/DL (ref 65–99)
HCT VFR BLD AUTO: 40.3 % (ref 42–52)
HGB BLD-MCNC: 12.6 G/DL (ref 14–18)
IMM GRANULOCYTES # BLD AUTO: 0.05 K/UL (ref 0–0.11)
IMM GRANULOCYTES NFR BLD AUTO: 0.7 % (ref 0–0.9)
LYMPHOCYTES # BLD AUTO: 1.49 K/UL (ref 1–4.8)
LYMPHOCYTES NFR BLD: 20.6 % (ref 22–41)
MCH RBC QN AUTO: 30.6 PG (ref 27–33)
MCHC RBC AUTO-ENTMCNC: 31.3 G/DL (ref 32.3–36.5)
MCV RBC AUTO: 97.8 FL (ref 81.4–97.8)
MONOCYTES # BLD AUTO: 0.65 K/UL (ref 0–0.85)
MONOCYTES NFR BLD AUTO: 9 % (ref 0–13.4)
NEUTROPHILS # BLD AUTO: 4.82 K/UL (ref 1.82–7.42)
NEUTROPHILS NFR BLD: 66.5 % (ref 44–72)
NRBC # BLD AUTO: 0 K/UL
NRBC BLD-RTO: 0 /100 WBC (ref 0–0.2)
PLATELET # BLD AUTO: 243 K/UL (ref 164–446)
PMV BLD AUTO: 8.4 FL (ref 9–12.9)
POTASSIUM SERPL-SCNC: 4 MMOL/L (ref 3.6–5.5)
PROT SERPL-MCNC: 8.3 G/DL (ref 6–8.2)
RBC # BLD AUTO: 4.12 M/UL (ref 4.7–6.1)
SODIUM SERPL-SCNC: 141 MMOL/L (ref 135–145)
T3FREE SERPL-MCNC: 2.53 PG/ML (ref 2–4.4)
T4 FREE SERPL-MCNC: 1.36 NG/DL (ref 0.93–1.7)
THYROPEROXIDASE AB SERPL-ACNC: 18 IU/ML (ref 0–9)
TSH SERPL DL<=0.005 MIU/L-ACNC: 1.01 UIU/ML (ref 0.38–5.33)
VIT B12 SERPL-MCNC: 259 PG/ML (ref 211–911)
WBC # BLD AUTO: 7.2 K/UL (ref 4.8–10.8)

## 2024-06-20 PROCEDURE — 80053 COMPREHEN METABOLIC PANEL: CPT

## 2024-06-20 PROCEDURE — 82607 VITAMIN B-12: CPT

## 2024-06-20 PROCEDURE — 82533 TOTAL CORTISOL: CPT

## 2024-06-20 PROCEDURE — 86376 MICROSOMAL ANTIBODY EACH: CPT

## 2024-06-20 PROCEDURE — 82384 ASSAY THREE CATECHOLAMINES: CPT

## 2024-06-20 PROCEDURE — 36415 COLL VENOUS BLD VENIPUNCTURE: CPT

## 2024-06-20 PROCEDURE — 85025 COMPLETE CBC W/AUTO DIFF WBC: CPT

## 2024-06-20 PROCEDURE — 84443 ASSAY THYROID STIM HORMONE: CPT

## 2024-06-20 PROCEDURE — 83835 ASSAY OF METANEPHRINES: CPT

## 2024-06-20 PROCEDURE — 84439 ASSAY OF FREE THYROXINE: CPT

## 2024-06-20 PROCEDURE — 84481 FREE ASSAY (FT-3): CPT

## 2024-06-20 PROCEDURE — 82024 ASSAY OF ACTH: CPT

## 2024-06-20 PROCEDURE — 82306 VITAMIN D 25 HYDROXY: CPT

## 2024-06-20 NOTE — PATIENT INSTRUCTIONS
After Visit Summary Wound Care Instructions    Nutrition - Patient instructed increased protein diet unless contraindicated in renal failure (meat, eggs, fish, yogurt, cottage cheese, beans), use of multivitamin with minerals and Arginaid supplementation (check if ok with Primary Care Provider first).    Infection -  instructed signs and symptoms of infection, increased redness and swelling, localized heat over wound and surrounding area/fever/chills/nausea and vomiting, when to call doctor or go to Emergency Room.     Dressing Changes - Instructed patient rationale for wound care products. Instructed to keep dressings clean and dry, shower on clinic days right before coming in. Change your dressing if it becomes soiled, soaked, or falls off.    Wound VAC may not have any drainage in tube or canister & it will still be working.   Change canister if it does become full by pressing tab on side of machine to remove canister and snap on new one. Full canister can be thrown in the trash. If canister fills with bright red blood, turn machine off and go to Emergency Room immediately. Dressing will be changed every Monday, Wednesday and Friday at the wound clinic.  If you are having issues with your wound VAC, please consider patching leaks, changing the canister, or calling [phone] for troubleshooting. If the wound VAC has been off or un-operational for over 2 hours, call wound care center to inform them and remove all dressings including black foam and replace with normal saline damp gauze, then dry gauze over that, secure with tape.     Questions - should you have any questions regarding your home care instructions, please contact the wound center at [phone]. If after hours, contact your primary care physician or go to the hospital emergency room.

## 2024-06-21 ENCOUNTER — TELEPHONE (OUTPATIENT)
Dept: INFECTIOUS DISEASES | Facility: MEDICAL CENTER | Age: 74
End: 2024-06-21
Payer: MEDICARE

## 2024-06-21 RX ORDER — 0.9 % SODIUM CHLORIDE 0.9 %
VIAL (ML) INJECTION PRN
Status: CANCELLED | OUTPATIENT
Start: 2024-06-22

## 2024-06-21 RX ORDER — 0.9 % SODIUM CHLORIDE 0.9 %
10 VIAL (ML) INJECTION PRN
Status: CANCELLED | OUTPATIENT
Start: 2024-06-22

## 2024-06-21 RX ORDER — 0.9 % SODIUM CHLORIDE 0.9 %
3 VIAL (ML) INJECTION PRN
Status: CANCELLED | OUTPATIENT
Start: 2024-06-22

## 2024-06-21 NOTE — PROGRESS NOTES
Orders placed for outpatient infusion center for IV ertapenem 1 g daily, 6-week course with end date of 8/3/2024.  Possibly may alter end date based upon day of starting antibiotic therapy.    Patient went to appointment today to get PICC line placement but but received phone call from outpatient infusion services that his infusions would be out of network.  Patient was confused about what services would be out of network and decided to refuse PICC line placement.  PICC line was to be covered but he would have to go to outpatient infusion center daily for antibiotic.  Outpatient infusion center orders placed.

## 2024-06-21 NOTE — TELEPHONE ENCOUNTER
Called patient and left message, [facility] left message that she had not been able to reach patient to discuss home infusion cost. I left the contact info for patient to call [facility]

## 2024-06-21 NOTE — TELEPHONE ENCOUNTER
Spoke with patient and explained orders from APRN Silva placed for  [facility]. Provided [facility] phone number and advised patient to get his PICC line placement appt rescheduled. We have [facility] working on home infusions orders when approved, [facility] order placed as well. Patient may also Go to ED for admission no oral options per ADILSON Ascencio, that way treatment plan can be arranged while patient receiving his IV Abx. Patient has questions for APRN will let Robson call patient and answer qiestions.

## 2024-06-23 LAB — ACTH PLAS-MCNC: 4.9 PG/ML (ref 7.2–63.3)

## 2024-06-24 ENCOUNTER — HOSPITAL ENCOUNTER (OUTPATIENT)
Dept: RADIOLOGY | Facility: MEDICAL CENTER | Age: 74
End: 2024-06-24
Attending: NURSE PRACTITIONER
Payer: MEDICARE

## 2024-06-24 LAB
DOPAMINE SERPL-MCNC: 301 PMOL/L
EPINEPH PLAS-MCNC: 170 PMOL/L
METANEPHS SERPL-SCNC: 0.18 NMOL/L (ref 0–0.49)
NOREPINEPH PLAS-MCNC: 6048 PMOL/L (ref 1050–4800)
NORMETANEPHRINE SERPL-SCNC: 0.51 NMOL/L (ref 0–0.89)

## 2024-06-24 PROCEDURE — 36573 INSJ PICC RS&I 5 YR+: CPT

## 2024-06-24 NOTE — PROGRESS NOTES
Vascular Access Team    Date of Insertion: 06/24/2024  Arm Circumference: 36 cm  Internal length: 45 cm  External Length: 1 cm  Vein Occupancy: 28 %  Reason for PICC: ABX > 29 days  Labs within procedure parameters.    Ultrasound images uploaded to PACS and viewable in the EMR - yes  Ultrasound images printed and placed in paper chart - no     BARD Power PICC Lot #: [id number]  Expiration Date: 06/30/2025    Chart reviewed for provider order, indications and contraindications for peripherally inserted central catheter. Labs reviewed and are within procedure parameters. Nursing care plan includes knowledge deficit, potential for pain and anxiety, potential for infection. POC: patient teaching, comfort measures, and sterile technique using five step bundle to prevent CR-BSI. Risks and benefits of procedure explained to patient emphasizing risk of central bloodstream infection. Questions answered. Patient verbalized understanding. Consent signed by patient.     Maximum barrier precautions and aseptic technique used. 1 ml of 1% lidocaine injected intradermally to insertion site at LUE. 21 gauge micro-introducer needle used to access Cephalic vein. Modified Seldinger technique used to insert 4 fr single lumen 45 cm catheter with brisk blood return noted through each lumen. Each lumen flushed with 10 ml normal saline using pulsatile method without resistance. PICC secured with Statlock at 1 cm alexa. Biopatch and Tegaderm applied over insertion site. 3CG/EKG technology used with appropriate waveform electronically added to chart via [employer]      # of attempts: one      Time out and LDA documentation completed. Patient condition and procedure outcome reported to unit RN and /or ordering provider via this post-procedure note.     Patient educated re: PICC care. Written post-procedure instructions given to patient.

## 2024-06-25 ENCOUNTER — TELEPHONE (OUTPATIENT)
Dept: INFECTIOUS DISEASES | Facility: MEDICAL CENTER | Age: 74
End: 2024-06-25
Payer: MEDICARE

## 2024-06-25 NOTE — TELEPHONE ENCOUNTER
Patient called PICC line was placed yesterday and wanted his infusion to be scheduled. Reminded patient I spoke with him Friday 6/21 and provided phone number for [facility] to patient so that he can call and schedule infusions. I also gave him phone number for [facility]. Patient was to schedule at [facility] while [facility] was working on referral for home infusions. Patient stated that he spoke with his insurance and home infusions are too expensive. Provided patient with the infusion center phone number again and also transferred him over to make appointment. Orders were placed by ADILSON Ascencio 6/21

## 2024-06-26 ENCOUNTER — OFFICE VISIT (OUTPATIENT)
Dept: WOUND CARE | Facility: MEDICAL CENTER | Age: 74
End: 2024-06-26
Payer: MEDICARE

## 2024-06-26 ENCOUNTER — OUTPATIENT INFUSION SERVICES (OUTPATIENT)
Dept: ONCOLOGY | Facility: MEDICAL CENTER | Age: 74
End: 2024-06-26
Attending: NURSE PRACTITIONER
Payer: MEDICARE

## 2024-06-26 VITALS
RESPIRATION RATE: 18 BRPM | OXYGEN SATURATION: 93 % | SYSTOLIC BLOOD PRESSURE: 124 MMHG | DIASTOLIC BLOOD PRESSURE: 79 MMHG | HEART RATE: 96 BPM | TEMPERATURE: 98.4 F

## 2024-06-26 VITALS
RESPIRATION RATE: 20 BRPM | TEMPERATURE: 98.6 F | HEART RATE: 98 BPM | OXYGEN SATURATION: 93 % | DIASTOLIC BLOOD PRESSURE: 76 MMHG | SYSTOLIC BLOOD PRESSURE: 112 MMHG

## 2024-06-26 DIAGNOSIS — M86.9 OSTEOMYELITIS OF LEFT LOWER EXTREMITY (HCC): ICD-10-CM

## 2024-06-26 DIAGNOSIS — L89.894 PRESSURE INJURY OF LEFT FOOT, STAGE 4 (HCC): ICD-10-CM

## 2024-06-26 DIAGNOSIS — M86.18 OTHER ACUTE OSTEOMYELITIS, OTHER SITE (HCC): ICD-10-CM

## 2024-06-26 DIAGNOSIS — L89.154 SACRAL DECUBITUS ULCER, STAGE IV (HCC): ICD-10-CM

## 2024-06-26 DIAGNOSIS — G82.54 QUADRIPLEGIA, C5-C7 INCOMPLETE (HCC): Primary | ICD-10-CM

## 2024-06-26 DIAGNOSIS — L89.324 PRESSURE INJURY OF LEFT ISCHIUM, STAGE 4 (HCC): ICD-10-CM

## 2024-06-26 DIAGNOSIS — L89.314 PRESSURE INJURY OF RIGHT ISCHIUM, STAGE 4 (HCC): ICD-10-CM

## 2024-06-26 DIAGNOSIS — L89.623 PRESSURE INJURY OF LEFT HEEL, STAGE 3 (HCC): ICD-10-CM

## 2024-06-26 DIAGNOSIS — G82.54 QUADRIPLEGIA, C5-C7, INCOMPLETE (HCC): ICD-10-CM

## 2024-06-26 DIAGNOSIS — T81.31XD POSTOPERATIVE WOUND DEHISCENCE, SUBSEQUENT ENCOUNTER: ICD-10-CM

## 2024-06-26 DIAGNOSIS — L89.893 PRESSURE INJURY OF LEFT LEG, STAGE 3 (HCC): ICD-10-CM

## 2024-06-26 DIAGNOSIS — M86.09 ACUTE HEMATOGENOUS OSTEOMYELITIS OF MULTIPLE SITES (HCC): ICD-10-CM

## 2024-06-26 PROCEDURE — 700111 HCHG RX REV CODE 636 W/ 250 OVERRIDE (IP): Mod: JZ | Performed by: NURSE PRACTITIONER

## 2024-06-26 PROCEDURE — 97605 NEG PRS WND THER DME<=50SQCM: CPT

## 2024-06-26 PROCEDURE — 99213 OFFICE O/P EST LOW 20 MIN: CPT

## 2024-06-26 PROCEDURE — 700105 HCHG RX REV CODE 258: Performed by: NURSE PRACTITIONER

## 2024-06-26 PROCEDURE — 11043 DBRDMT MUSC&/FSCA 1ST 20/<: CPT

## 2024-06-26 PROCEDURE — 11046 DBRDMT MUSC&/FSCA EA ADDL: CPT

## 2024-06-26 PROCEDURE — 96365 THER/PROPH/DIAG IV INF INIT: CPT

## 2024-06-26 RX ORDER — 0.9 % SODIUM CHLORIDE 0.9 %
3 VIAL (ML) INJECTION PRN
Status: CANCELLED | OUTPATIENT
Start: 2024-06-27

## 2024-06-26 RX ORDER — 0.9 % SODIUM CHLORIDE 0.9 %
10 VIAL (ML) INJECTION PRN
Status: CANCELLED | OUTPATIENT
Start: 2024-06-27

## 2024-06-26 RX ORDER — 0.9 % SODIUM CHLORIDE 0.9 %
VIAL (ML) INJECTION PRN
Status: CANCELLED | OUTPATIENT
Start: 2024-06-27

## 2024-06-26 RX ADMIN — ERTAPENEM 1000 MG: 1 INJECTION, POWDER, LYOPHILIZED, FOR SOLUTION INTRAMUSCULAR; INTRAVENOUS at 15:29

## 2024-06-26 NOTE — PATIENT INSTRUCTIONS
-Keep your wound dressing clean, dry, and intact. Only change dressing if it's over saturated, soiled or falls off.     -Wound vac may not have any drainage in tube or cannister & it will still be working.   Change cannister if it does become full by pressing tab on side of machine to remove canister and snap on new one. Full canister can be thrown in the trash. If cannister fills with bright red blood - go to ER. Dressing will be changed every MWF at the wound clini.  If you are having issues with your wound VAC, please consider patching leaks, changing the canister, or calling [phone] for troubleshooting. If the wound VAC has been off or un-operational for over 2 hours, call wound care center to inform them and remove all dressings including black foam and replace with normal saline damp gauze.     -Should you experience any significant changes in your wound(s), such as infection (redness, swelling, localized heat, increased pain, fever > 101 F, chills) or have any questions regarding your home care instructions, please contact the wound center at [phone]. If after hours, contact your primary care physician or go to the hospital emergency room.

## 2024-06-26 NOTE — PROGRESS NOTES
VAC dressing removed. Wound bed inspected and no residual foam noted. NPWT changed this visit using 3 pieces of black foam. Pump set to neg 125mmhg continuous. No leaks noted.

## 2024-06-27 ENCOUNTER — OUTPATIENT INFUSION SERVICES (OUTPATIENT)
Dept: ONCOLOGY | Facility: MEDICAL CENTER | Age: 74
End: 2024-06-27
Attending: NURSE PRACTITIONER
Payer: MEDICARE

## 2024-06-27 VITALS
OXYGEN SATURATION: 94 % | BODY MASS INDEX: 30.84 KG/M2 | RESPIRATION RATE: 18 BRPM | DIASTOLIC BLOOD PRESSURE: 72 MMHG | TEMPERATURE: 98.7 F | WEIGHT: 214.95 LBS | HEART RATE: 97 BPM | SYSTOLIC BLOOD PRESSURE: 119 MMHG

## 2024-06-27 DIAGNOSIS — M86.09 ACUTE HEMATOGENOUS OSTEOMYELITIS OF MULTIPLE SITES (HCC): ICD-10-CM

## 2024-06-27 PROCEDURE — 700105 HCHG RX REV CODE 258: Performed by: NURSE PRACTITIONER

## 2024-06-27 PROCEDURE — 96365 THER/PROPH/DIAG IV INF INIT: CPT

## 2024-06-27 PROCEDURE — 700111 HCHG RX REV CODE 636 W/ 250 OVERRIDE (IP): Performed by: NURSE PRACTITIONER

## 2024-06-27 RX ORDER — 0.9 % SODIUM CHLORIDE 0.9 %
10 VIAL (ML) INJECTION PRN
Status: CANCELLED | OUTPATIENT
Start: 2024-06-28

## 2024-06-27 RX ORDER — 0.9 % SODIUM CHLORIDE 0.9 %
3 VIAL (ML) INJECTION PRN
Status: CANCELLED | OUTPATIENT
Start: 2024-06-28

## 2024-06-27 RX ORDER — 0.9 % SODIUM CHLORIDE 0.9 %
VIAL (ML) INJECTION PRN
Status: CANCELLED | OUTPATIENT
Start: 2024-06-28

## 2024-06-27 RX ADMIN — ERTAPENEM 1000 MG: 1 INJECTION, POWDER, LYOPHILIZED, FOR SOLUTION INTRAMUSCULAR; INTRAVENOUS at 17:37

## 2024-06-27 ASSESSMENT — FIBROSIS 4 INDEX: FIB4 SCORE: 1.12

## 2024-06-27 NOTE — PROGRESS NOTES
Anjum presents to infusion for daily ertapenem for osteomyelitis. Patient reports feeling well today. Plan of care discussed and agreed upon.    PICC line flushed with NS with positive blood return.    ertapenem infused over 30 minutes, patient tolerated well with no adverse effects.     PICC flushed post infusion with positive blood return.   Patient left in stable condition, knows when to return for appointment tomorrow.

## 2024-06-28 ENCOUNTER — OUTPATIENT INFUSION SERVICES (OUTPATIENT)
Dept: ONCOLOGY | Facility: MEDICAL CENTER | Age: 74
End: 2024-06-28
Attending: NURSE PRACTITIONER
Payer: MEDICARE

## 2024-06-28 VITALS
DIASTOLIC BLOOD PRESSURE: 73 MMHG | TEMPERATURE: 98 F | OXYGEN SATURATION: 91 % | HEART RATE: 90 BPM | RESPIRATION RATE: 18 BRPM | SYSTOLIC BLOOD PRESSURE: 120 MMHG

## 2024-06-28 DIAGNOSIS — M86.09 ACUTE HEMATOGENOUS OSTEOMYELITIS OF MULTIPLE SITES (HCC): ICD-10-CM

## 2024-06-28 PROCEDURE — 700105 HCHG RX REV CODE 258: Performed by: NURSE PRACTITIONER

## 2024-06-28 PROCEDURE — 96365 THER/PROPH/DIAG IV INF INIT: CPT

## 2024-06-28 PROCEDURE — 700111 HCHG RX REV CODE 636 W/ 250 OVERRIDE (IP): Mod: JZ | Performed by: NURSE PRACTITIONER

## 2024-06-28 RX ORDER — 0.9 % SODIUM CHLORIDE 0.9 %
VIAL (ML) INJECTION PRN
Status: CANCELLED | OUTPATIENT
Start: 2024-06-29

## 2024-06-28 RX ORDER — 0.9 % SODIUM CHLORIDE 0.9 %
10 VIAL (ML) INJECTION PRN
Status: CANCELLED | OUTPATIENT
Start: 2024-06-29

## 2024-06-28 RX ORDER — 0.9 % SODIUM CHLORIDE 0.9 %
3 VIAL (ML) INJECTION PRN
Status: CANCELLED | OUTPATIENT
Start: 2024-06-29

## 2024-06-28 RX ADMIN — ERTAPENEM 1000 MG: 1 INJECTION, POWDER, LYOPHILIZED, FOR SOLUTION INTRAMUSCULAR; INTRAVENOUS at 17:34

## 2024-06-28 NOTE — PROGRESS NOTES
Pt presented to infusion center for daily Invanz. Pt reports no significant changes since yesterday. LUE arm PICC line in place, flushed and brisk blood return observed. Invanz infused with no s/s of adverse reaction. PICC line flushed, brisk blood return again observed, declined sleeve. Has next appt, left in motorized w/c.

## 2024-06-29 ENCOUNTER — OUTPATIENT INFUSION SERVICES (OUTPATIENT)
Dept: ONCOLOGY | Facility: MEDICAL CENTER | Age: 74
End: 2024-06-29
Attending: NURSE PRACTITIONER
Payer: MEDICARE

## 2024-06-29 VITALS
SYSTOLIC BLOOD PRESSURE: 170 MMHG | DIASTOLIC BLOOD PRESSURE: 88 MMHG | RESPIRATION RATE: 18 BRPM | TEMPERATURE: 97.8 F | HEART RATE: 77 BPM | OXYGEN SATURATION: 91 %

## 2024-06-29 DIAGNOSIS — M86.09 ACUTE HEMATOGENOUS OSTEOMYELITIS OF MULTIPLE SITES (HCC): ICD-10-CM

## 2024-06-29 PROCEDURE — 96366 THER/PROPH/DIAG IV INF ADDON: CPT

## 2024-06-29 PROCEDURE — 700111 HCHG RX REV CODE 636 W/ 250 OVERRIDE (IP): Performed by: NURSE PRACTITIONER

## 2024-06-29 PROCEDURE — 700105 HCHG RX REV CODE 258: Performed by: NURSE PRACTITIONER

## 2024-06-29 RX ORDER — 0.9 % SODIUM CHLORIDE 0.9 %
10 VIAL (ML) INJECTION PRN
Status: CANCELLED | OUTPATIENT
Start: 2024-06-30

## 2024-06-29 RX ORDER — 0.9 % SODIUM CHLORIDE 0.9 %
3 VIAL (ML) INJECTION PRN
Status: CANCELLED | OUTPATIENT
Start: 2024-06-30

## 2024-06-29 RX ORDER — 0.9 % SODIUM CHLORIDE 0.9 %
VIAL (ML) INJECTION PRN
Status: CANCELLED | OUTPATIENT
Start: 2024-06-30

## 2024-06-29 RX ADMIN — ERTAPENEM 1000 MG: 1 INJECTION, POWDER, LYOPHILIZED, FOR SOLUTION INTRAMUSCULAR; INTRAVENOUS at 16:58

## 2024-06-29 NOTE — PROGRESS NOTES
Anjum returns to IS via motorized wheelchair for daily antibiotic therapy.  He denies any new or acute changes.  LUE PICC intact, flushes easily, + blood return observed.  Ertpenem infused as ordered, Anjum tolerated well.  PICC flushed with NS, clave changed.  Anjum discharged in NAD, next appointment confirmed.

## 2024-06-30 ENCOUNTER — OUTPATIENT INFUSION SERVICES (OUTPATIENT)
Dept: ONCOLOGY | Facility: MEDICAL CENTER | Age: 74
End: 2024-06-30
Attending: NURSE PRACTITIONER
Payer: MEDICARE

## 2024-06-30 VITALS
DIASTOLIC BLOOD PRESSURE: 67 MMHG | OXYGEN SATURATION: 94 % | HEART RATE: 81 BPM | WEIGHT: 214.95 LBS | RESPIRATION RATE: 18 BRPM | BODY MASS INDEX: 30.84 KG/M2 | TEMPERATURE: 97.1 F | SYSTOLIC BLOOD PRESSURE: 115 MMHG

## 2024-06-30 DIAGNOSIS — M86.09 ACUTE HEMATOGENOUS OSTEOMYELITIS OF MULTIPLE SITES (HCC): ICD-10-CM

## 2024-06-30 PROCEDURE — 96365 THER/PROPH/DIAG IV INF INIT: CPT

## 2024-06-30 PROCEDURE — 700105 HCHG RX REV CODE 258: Performed by: NURSE PRACTITIONER

## 2024-06-30 PROCEDURE — 700111 HCHG RX REV CODE 636 W/ 250 OVERRIDE (IP): Mod: JZ | Performed by: NURSE PRACTITIONER

## 2024-06-30 RX ORDER — 0.9 % SODIUM CHLORIDE 0.9 %
3 VIAL (ML) INJECTION PRN
OUTPATIENT
Start: 2024-07-01

## 2024-06-30 RX ORDER — 0.9 % SODIUM CHLORIDE 0.9 %
VIAL (ML) INJECTION PRN
OUTPATIENT
Start: 2024-07-01

## 2024-06-30 RX ORDER — 0.9 % SODIUM CHLORIDE 0.9 %
10 VIAL (ML) INJECTION PRN
OUTPATIENT
Start: 2024-07-01

## 2024-06-30 RX ADMIN — ERTAPENEM 1000 MG: 1 INJECTION, POWDER, LYOPHILIZED, FOR SOLUTION INTRAMUSCULAR; INTRAVENOUS at 18:10

## 2024-06-30 ASSESSMENT — FIBROSIS 4 INDEX: FIB4 SCORE: 1.12

## 2024-06-30 NOTE — PROGRESS NOTES
Anjum came into infusion services today for daily invanz. No complaints. PICC line had +blood return, flushed well. Invanz given as prescribed, tolerated well. PICC line had +blood return post infusion, flushed well. Pt has future appointments. Discharged to self care.

## 2024-07-01 ENCOUNTER — APPOINTMENT (OUTPATIENT)
Dept: ONCOLOGY | Facility: MEDICAL CENTER | Age: 74
End: 2024-07-01
Attending: NURSE PRACTITIONER
Payer: MEDICARE

## 2024-07-01 VITALS
TEMPERATURE: 97.4 F | DIASTOLIC BLOOD PRESSURE: 61 MMHG | HEART RATE: 82 BPM | OXYGEN SATURATION: 98 % | RESPIRATION RATE: 18 BRPM | SYSTOLIC BLOOD PRESSURE: 115 MMHG

## 2024-07-01 DIAGNOSIS — M86.09 ACUTE HEMATOGENOUS OSTEOMYELITIS OF MULTIPLE SITES (HCC): ICD-10-CM

## 2024-07-01 LAB
ALBUMIN SERPL BCP-MCNC: 3.2 G/DL (ref 3.2–4.9)
ALBUMIN/GLOB SERPL: 0.8 G/DL
ALP SERPL-CCNC: 76 U/L (ref 30–99)
ALT SERPL-CCNC: 8 U/L (ref 2–50)
ANION GAP SERPL CALC-SCNC: 10 MMOL/L (ref 7–16)
AST SERPL-CCNC: 14 U/L (ref 12–45)
BASOPHILS # BLD AUTO: 0.3 % (ref 0–1.8)
BASOPHILS # BLD: 0.02 K/UL (ref 0–0.12)
BILIRUB SERPL-MCNC: 0.3 MG/DL (ref 0.1–1.5)
BUN SERPL-MCNC: 16 MG/DL (ref 8–22)
CALCIUM ALBUM COR SERPL-MCNC: 9.7 MG/DL (ref 8.5–10.5)
CALCIUM SERPL-MCNC: 9.1 MG/DL (ref 8.5–10.5)
CHLORIDE SERPL-SCNC: 104 MMOL/L (ref 96–112)
CO2 SERPL-SCNC: 24 MMOL/L (ref 20–33)
CREAT SERPL-MCNC: 0.5 MG/DL (ref 0.5–1.4)
CRP SERPL HS-MCNC: 2.72 MG/DL (ref 0–0.75)
EOSINOPHIL # BLD AUTO: 0.28 K/UL (ref 0–0.51)
EOSINOPHIL NFR BLD: 4.8 % (ref 0–6.9)
ERYTHROCYTE [DISTWIDTH] IN BLOOD BY AUTOMATED COUNT: 61.3 FL (ref 35.9–50)
ERYTHROCYTE [SEDIMENTATION RATE] IN BLOOD BY WESTERGREN METHOD: 78 MM/HOUR (ref 0–20)
GFR SERPLBLD CREATININE-BSD FMLA CKD-EPI: 107 ML/MIN/1.73 M 2
GLOBULIN SER CALC-MCNC: 3.9 G/DL (ref 1.9–3.5)
GLUCOSE SERPL-MCNC: 128 MG/DL (ref 65–99)
HCT VFR BLD AUTO: 35.5 % (ref 42–52)
HGB BLD-MCNC: 11 G/DL (ref 14–18)
IMM GRANULOCYTES # BLD AUTO: 0.05 K/UL (ref 0–0.11)
IMM GRANULOCYTES NFR BLD AUTO: 0.8 % (ref 0–0.9)
LYMPHOCYTES # BLD AUTO: 1.23 K/UL (ref 1–4.8)
LYMPHOCYTES NFR BLD: 20.9 % (ref 22–41)
MCH RBC QN AUTO: 30.3 PG (ref 27–33)
MCHC RBC AUTO-ENTMCNC: 31 G/DL (ref 32.3–36.5)
MCV RBC AUTO: 97.8 FL (ref 81.4–97.8)
MONOCYTES # BLD AUTO: 0.49 K/UL (ref 0–0.85)
MONOCYTES NFR BLD AUTO: 8.3 % (ref 0–13.4)
NEUTROPHILS # BLD AUTO: 3.82 K/UL (ref 1.82–7.42)
NEUTROPHILS NFR BLD: 64.9 % (ref 44–72)
NRBC # BLD AUTO: 0 K/UL
NRBC BLD-RTO: 0 /100 WBC (ref 0–0.2)
OUTPT INFUS CBC COMMENT OICOM: ABNORMAL
PLATELET # BLD AUTO: 229 K/UL (ref 164–446)
PMV BLD AUTO: 8.6 FL (ref 9–12.9)
POTASSIUM SERPL-SCNC: 4.1 MMOL/L (ref 3.6–5.5)
PROT SERPL-MCNC: 7.1 G/DL (ref 6–8.2)
RBC # BLD AUTO: 3.63 M/UL (ref 4.7–6.1)
SODIUM SERPL-SCNC: 138 MMOL/L (ref 135–145)
WBC # BLD AUTO: 5.9 K/UL (ref 4.8–10.8)

## 2024-07-01 PROCEDURE — 80053 COMPREHEN METABOLIC PANEL: CPT

## 2024-07-01 PROCEDURE — 86140 C-REACTIVE PROTEIN: CPT

## 2024-07-01 PROCEDURE — 85652 RBC SED RATE AUTOMATED: CPT

## 2024-07-01 PROCEDURE — 700111 HCHG RX REV CODE 636 W/ 250 OVERRIDE (IP): Performed by: NURSE PRACTITIONER

## 2024-07-01 PROCEDURE — 96365 THER/PROPH/DIAG IV INF INIT: CPT

## 2024-07-01 PROCEDURE — 85025 COMPLETE CBC W/AUTO DIFF WBC: CPT

## 2024-07-01 PROCEDURE — 700105 HCHG RX REV CODE 258: Performed by: NURSE PRACTITIONER

## 2024-07-01 RX ORDER — 0.9 % SODIUM CHLORIDE 0.9 %
3 VIAL (ML) INJECTION PRN
Status: CANCELLED | OUTPATIENT
Start: 2024-07-02

## 2024-07-01 RX ORDER — 0.9 % SODIUM CHLORIDE 0.9 %
VIAL (ML) INJECTION PRN
Status: CANCELLED | OUTPATIENT
Start: 2024-07-02

## 2024-07-01 RX ORDER — 0.9 % SODIUM CHLORIDE 0.9 %
10 VIAL (ML) INJECTION PRN
Status: CANCELLED | OUTPATIENT
Start: 2024-07-02

## 2024-07-01 RX ADMIN — ERTAPENEM 1000 MG: 1 INJECTION, POWDER, LYOPHILIZED, FOR SOLUTION INTRAMUSCULAR; INTRAVENOUS at 17:59

## 2024-07-01 NOTE — PROGRESS NOTES
Anjum arrived to the Infusion Center for daily Invanz via . Pt denies changes to health, medication or allergies. POC reviewed.     PICC line flushed, brisk blood return noted. PICC dressing lifted, dried blood noted on biopatch, will change.     IV abx infused per MD order, Anjum tolerated well. PICC line dressing changed per policy. PICC line flushed, blood return noted.     Confirmed next appointment and Anjum was discharged home in no acute distress.

## 2024-07-02 ENCOUNTER — APPOINTMENT (OUTPATIENT)
Dept: ONCOLOGY | Facility: MEDICAL CENTER | Age: 74
End: 2024-07-02
Attending: NURSE PRACTITIONER
Payer: MEDICARE

## 2024-07-02 VITALS
OXYGEN SATURATION: 96 % | BODY MASS INDEX: 30.84 KG/M2 | DIASTOLIC BLOOD PRESSURE: 65 MMHG | HEART RATE: 70 BPM | WEIGHT: 214.95 LBS | TEMPERATURE: 97.9 F | RESPIRATION RATE: 16 BRPM | SYSTOLIC BLOOD PRESSURE: 102 MMHG

## 2024-07-02 DIAGNOSIS — M86.09 ACUTE HEMATOGENOUS OSTEOMYELITIS OF MULTIPLE SITES (HCC): ICD-10-CM

## 2024-07-02 PROCEDURE — 700105 HCHG RX REV CODE 258: Performed by: NURSE PRACTITIONER

## 2024-07-02 PROCEDURE — 700111 HCHG RX REV CODE 636 W/ 250 OVERRIDE (IP): Performed by: NURSE PRACTITIONER

## 2024-07-02 PROCEDURE — 96365 THER/PROPH/DIAG IV INF INIT: CPT

## 2024-07-02 RX ORDER — 0.9 % SODIUM CHLORIDE 0.9 %
VIAL (ML) INJECTION PRN
Status: CANCELLED | OUTPATIENT
Start: 2024-07-03

## 2024-07-02 RX ORDER — 0.9 % SODIUM CHLORIDE 0.9 %
3 VIAL (ML) INJECTION PRN
Status: CANCELLED | OUTPATIENT
Start: 2024-07-03

## 2024-07-02 RX ORDER — 0.9 % SODIUM CHLORIDE 0.9 %
10 VIAL (ML) INJECTION PRN
Status: CANCELLED | OUTPATIENT
Start: 2024-07-03

## 2024-07-02 RX ADMIN — ERTAPENEM 1000 MG: 1 INJECTION, POWDER, LYOPHILIZED, FOR SOLUTION INTRAMUSCULAR; INTRAVENOUS at 17:32

## 2024-07-02 ASSESSMENT — FIBROSIS 4 INDEX: FIB4 SCORE: 1.6

## 2024-07-03 ENCOUNTER — OFFICE VISIT (OUTPATIENT)
Dept: WOUND CARE | Facility: MEDICAL CENTER | Age: 74
End: 2024-07-03
Payer: MEDICARE

## 2024-07-03 ENCOUNTER — APPOINTMENT (OUTPATIENT)
Dept: RADIOLOGY | Facility: MEDICAL CENTER | Age: 74
End: 2024-07-03
Attending: EMERGENCY MEDICINE
Payer: MEDICARE

## 2024-07-03 ENCOUNTER — HOSPITAL ENCOUNTER (OUTPATIENT)
Dept: RADIOLOGY | Facility: MEDICAL CENTER | Age: 74
End: 2024-07-03
Attending: INTERNAL MEDICINE
Payer: MEDICARE

## 2024-07-03 ENCOUNTER — OUTPATIENT INFUSION SERVICES (OUTPATIENT)
Dept: ONCOLOGY | Facility: MEDICAL CENTER | Age: 74
End: 2024-07-03
Attending: NURSE PRACTITIONER
Payer: MEDICARE

## 2024-07-03 ENCOUNTER — HOSPITAL ENCOUNTER (EMERGENCY)
Facility: MEDICAL CENTER | Age: 74
End: 2024-07-03
Attending: EMERGENCY MEDICINE
Payer: MEDICARE

## 2024-07-03 VITALS
SYSTOLIC BLOOD PRESSURE: 164 MMHG | DIASTOLIC BLOOD PRESSURE: 87 MMHG | OXYGEN SATURATION: 96 % | TEMPERATURE: 97.5 F | HEART RATE: 75 BPM | RESPIRATION RATE: 18 BRPM

## 2024-07-03 VITALS
RESPIRATION RATE: 18 BRPM | HEART RATE: 59 BPM | DIASTOLIC BLOOD PRESSURE: 86 MMHG | HEIGHT: 70 IN | SYSTOLIC BLOOD PRESSURE: 192 MMHG | BODY MASS INDEX: 30.64 KG/M2 | WEIGHT: 214 LBS | TEMPERATURE: 98.7 F | OXYGEN SATURATION: 97 %
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
## 2024-07-03 DIAGNOSIS — E27.8 ABNORMALITY OF CORTISOL-BINDING GLOBULIN (HCC): ICD-10-CM

## 2024-07-03 DIAGNOSIS — M86.09 ACUTE HEMATOGENOUS OSTEOMYELITIS OF MULTIPLE SITES (HCC): ICD-10-CM

## 2024-07-03 DIAGNOSIS — Z46.89 ENCOUNTER FOR MANAGEMENT OF WOUND VAC: ICD-10-CM

## 2024-07-03 DIAGNOSIS — L89.154 SACRAL DECUBITUS ULCER, STAGE IV (HCC): ICD-10-CM

## 2024-07-03 PROCEDURE — 96365 THER/PROPH/DIAG IV INF INIT: CPT

## 2024-07-03 PROCEDURE — 700111 HCHG RX REV CODE 636 W/ 250 OVERRIDE (IP): Performed by: NURSE PRACTITIONER

## 2024-07-03 PROCEDURE — 74170 CT ABD WO CNTRST FLWD CNTRST: CPT

## 2024-07-03 PROCEDURE — 71045 X-RAY EXAM CHEST 1 VIEW: CPT

## 2024-07-03 PROCEDURE — 99999 PR NO CHARGE: CPT | Performed by: NURSE PRACTITIONER

## 2024-07-03 PROCEDURE — 700105 HCHG RX REV CODE 258: Performed by: NURSE PRACTITIONER

## 2024-07-03 PROCEDURE — 72170 X-RAY EXAM OF PELVIS: CPT

## 2024-07-03 PROCEDURE — 99284 EMERGENCY DEPT VISIT MOD MDM: CPT

## 2024-07-03 PROCEDURE — 97605 NEG PRS WND THER DME<=50SQCM: CPT

## 2024-07-03 PROCEDURE — 700117 HCHG RX CONTRAST REV CODE 255: Performed by: INTERNAL MEDICINE

## 2024-07-03 RX ORDER — 0.9 % SODIUM CHLORIDE 0.9 %
VIAL (ML) INJECTION PRN
Status: CANCELLED | OUTPATIENT
Start: 2024-07-04

## 2024-07-03 RX ORDER — 0.9 % SODIUM CHLORIDE 0.9 %
10 VIAL (ML) INJECTION PRN
Status: CANCELLED | OUTPATIENT
Start: 2024-07-04

## 2024-07-03 RX ORDER — 0.9 % SODIUM CHLORIDE 0.9 %
3 VIAL (ML) INJECTION PRN
Status: CANCELLED | OUTPATIENT
Start: 2024-07-04

## 2024-07-03 RX ADMIN — ERTAPENEM 1000 MG: 1 INJECTION, POWDER, LYOPHILIZED, FOR SOLUTION INTRAMUSCULAR; INTRAVENOUS at 18:16

## 2024-07-03 RX ADMIN — IOHEXOL 100 ML: 350 INJECTION, SOLUTION INTRAVENOUS at 13:11

## 2024-07-03 ASSESSMENT — PAIN DESCRIPTION - PAIN TYPE: TYPE: ACUTE PAIN

## 2024-07-03 ASSESSMENT — FIBROSIS 4 INDEX: FIB4 SCORE: 1.6

## 2024-07-03 NOTE — DISCHARGE INSTRUCTIONS
Return to the ER for any nausea, vomiting, dizziness or lightheadedness, shortness of breath, generalized weakness or fatigue, or for anything unusual that makes you think you might have an injury from your fall today.  As discussed with the emergency physician, you have decided to decline advanced imaging with a CAT scan to make sure that you do not have any intrathoracic or intra-abdominal organ injury from your fall today.  You are welcome to return at any time if you change your mind about undergoing imaging or if you start to feel poorly in any way.      Please follow-up with your primary care physician after the holiday for reevaluation.    Proceed to the infusion center for your antibiotic as scheduled.

## 2024-07-03 NOTE — ED NOTES
Pt transfered to [facility] via [facility] from [facility]. Pt wv not holding suction upon exam the  cannister is cracked from the fall. Wv Rn notified she ordered out pt wv cannister we are awaiting arrival to change pt's drsg  and replace cannister. Pt informed of all states understanding to same.
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
## 2024-07-03 NOTE — ED NOTES
Per er pharm, pt nees to go to [facility] for infusion as med not available here. Await wound vac cannister and dressing chg by wound care per previous order prior to d/c. Pt aware of same   Dorsal Nasal Flap Text: The defect edges were debeveled with a #15 scalpel blade.  Given the location of the defect and the proximity to free margins a dorsal nasal flap was deemed most appropriate.  Using a sterile surgical marker, an appropriate dorsal nasal flap was drawn around the defect.    The area thus outlined was incised deep to adipose tissue with a #15 scalpel blade.  The skin margins were undermined to an appropriate distance in all directions utilizing iris scissors.
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
## 2024-07-04 ENCOUNTER — OUTPATIENT INFUSION SERVICES (OUTPATIENT)
Dept: ONCOLOGY | Facility: MEDICAL CENTER | Age: 74
End: 2024-07-04
Attending: NURSE PRACTITIONER
Payer: MEDICARE

## 2024-07-04 VITALS
HEART RATE: 85 BPM | DIASTOLIC BLOOD PRESSURE: 74 MMHG | SYSTOLIC BLOOD PRESSURE: 128 MMHG | OXYGEN SATURATION: 92 % | TEMPERATURE: 98.7 F | RESPIRATION RATE: 18 BRPM

## 2024-07-04 DIAGNOSIS — M86.09 ACUTE HEMATOGENOUS OSTEOMYELITIS OF MULTIPLE SITES (HCC): ICD-10-CM

## 2024-07-04 PROCEDURE — 700111 HCHG RX REV CODE 636 W/ 250 OVERRIDE (IP): Performed by: NURSE PRACTITIONER

## 2024-07-04 PROCEDURE — 700105 HCHG RX REV CODE 258: Performed by: NURSE PRACTITIONER

## 2024-07-04 PROCEDURE — 96365 THER/PROPH/DIAG IV INF INIT: CPT

## 2024-07-04 RX ORDER — 0.9 % SODIUM CHLORIDE 0.9 %
10 VIAL (ML) INJECTION PRN
Status: CANCELLED | OUTPATIENT
Start: 2024-07-05

## 2024-07-04 RX ORDER — 0.9 % SODIUM CHLORIDE 0.9 %
3 VIAL (ML) INJECTION PRN
Status: CANCELLED | OUTPATIENT
Start: 2024-07-05

## 2024-07-04 RX ORDER — 0.9 % SODIUM CHLORIDE 0.9 %
VIAL (ML) INJECTION PRN
Status: CANCELLED | OUTPATIENT
Start: 2024-07-05

## 2024-07-04 RX ADMIN — ERTAPENEM 1000 MG: 1 INJECTION, POWDER, LYOPHILIZED, FOR SOLUTION INTRAMUSCULAR; INTRAVENOUS at 17:58

## 2024-07-04 NOTE — WOUND TEAM
Pt was seen in the ED r/t fall. Fall caused his Canister for VAC to crack creating a leak. Pt was supposed to go to his appointment at [facility] wound clinic for his VAC change. Received call from ED about home VAC canister broken and was able to get one delivered [facility] Since pt missed his appointment the drsg needed to be changed.   Pt has  wounds to both IT. Drsg removed and wounds cleaned with NS and gauze. Applied drape to periwound skin. Applied one piece of foam from one IT to the other by placing foam superior to sacrum from R IT and then inferior to L IT. Sealed with drape. Placed and piece of foam to  anterior R hip. Button and TRAC pad applied. Restarted NPWT with slight leak and was able to resume pressure to 125 mmHg continuous with no leaking. Applied sacral offloading drsg to sacrococcygeal area. Adhesive foam drsgs applied  over bilateral IT. Pt was done at 1715 so that he could get to the infusion center for appt.   Wounds are red with R having more bleeding than the left. There was a little tape stripping with removal of adhesive foam. Bleeding was stopped and mepitel was placed over the injury and no VAC tape was placed over the wound.

## 2024-07-04 NOTE — ED NOTES
"Assisting with care-Wound care completed by wound care rn. Pt returned to electric chair via brigid lift. At this time, pt noted portion of chair that \"keeps his leg in chair\" is broken. Er charge aware, broken piece given to pt .pt escorted by er tech to vehicle Aware of need to f/u with md, return for worsening s/s , new concerns .   "

## 2024-07-05 ENCOUNTER — OUTPATIENT INFUSION SERVICES (OUTPATIENT)
Dept: ONCOLOGY | Facility: MEDICAL CENTER | Age: 74
End: 2024-07-05
Attending: NURSE PRACTITIONER
Payer: MEDICARE

## 2024-07-05 VITALS
SYSTOLIC BLOOD PRESSURE: 144 MMHG | RESPIRATION RATE: 18 BRPM | HEART RATE: 77 BPM | TEMPERATURE: 98 F | OXYGEN SATURATION: 98 % | DIASTOLIC BLOOD PRESSURE: 85 MMHG

## 2024-07-05 DIAGNOSIS — M86.09 ACUTE HEMATOGENOUS OSTEOMYELITIS OF MULTIPLE SITES (HCC): ICD-10-CM

## 2024-07-05 PROCEDURE — 700105 HCHG RX REV CODE 258: Performed by: NURSE PRACTITIONER

## 2024-07-05 PROCEDURE — 96365 THER/PROPH/DIAG IV INF INIT: CPT

## 2024-07-05 PROCEDURE — 700111 HCHG RX REV CODE 636 W/ 250 OVERRIDE (IP): Performed by: NURSE PRACTITIONER

## 2024-07-05 RX ORDER — 0.9 % SODIUM CHLORIDE 0.9 %
10 VIAL (ML) INJECTION PRN
Status: CANCELLED | OUTPATIENT
Start: 2024-07-06

## 2024-07-05 RX ORDER — 0.9 % SODIUM CHLORIDE 0.9 %
VIAL (ML) INJECTION PRN
Status: CANCELLED | OUTPATIENT
Start: 2024-07-06

## 2024-07-05 RX ORDER — 0.9 % SODIUM CHLORIDE 0.9 %
3 VIAL (ML) INJECTION PRN
Status: CANCELLED | OUTPATIENT
Start: 2024-07-06

## 2024-07-05 RX ADMIN — ERTAPENEM 1000 MG: 1 INJECTION, POWDER, LYOPHILIZED, FOR SOLUTION INTRAMUSCULAR; INTRAVENOUS at 17:14

## 2024-07-05 NOTE — PROGRESS NOTES
Anjum arrived to the Infusion Center for daily Invanz via WC. Pt denies changes to health, medication or allergies. POC reviewed.     PICC line flushed, brisk blood return noted.   IV abx infused per MD order, Anjum tolerated well. Confirmed next appointment and Anjum was discharged home in no acute distress.

## 2024-07-06 ENCOUNTER — OUTPATIENT INFUSION SERVICES (OUTPATIENT)
Dept: ONCOLOGY | Facility: MEDICAL CENTER | Age: 74
End: 2024-07-06
Attending: NURSE PRACTITIONER
Payer: MEDICARE

## 2024-07-06 VITALS
OXYGEN SATURATION: 94 % | BODY MASS INDEX: 30.72 KG/M2 | WEIGHT: 214.07 LBS | DIASTOLIC BLOOD PRESSURE: 86 MMHG | SYSTOLIC BLOOD PRESSURE: 139 MMHG | HEART RATE: 86 BPM | TEMPERATURE: 100 F | RESPIRATION RATE: 18 BRPM

## 2024-07-06 DIAGNOSIS — M86.09 ACUTE HEMATOGENOUS OSTEOMYELITIS OF MULTIPLE SITES (HCC): ICD-10-CM

## 2024-07-06 PROCEDURE — 700105 HCHG RX REV CODE 258: Performed by: NURSE PRACTITIONER

## 2024-07-06 PROCEDURE — 700111 HCHG RX REV CODE 636 W/ 250 OVERRIDE (IP): Performed by: NURSE PRACTITIONER

## 2024-07-06 PROCEDURE — 96365 THER/PROPH/DIAG IV INF INIT: CPT

## 2024-07-06 RX ORDER — 0.9 % SODIUM CHLORIDE 0.9 %
10 VIAL (ML) INJECTION PRN
Status: CANCELLED | OUTPATIENT
Start: 2024-07-07

## 2024-07-06 RX ORDER — 0.9 % SODIUM CHLORIDE 0.9 %
3 VIAL (ML) INJECTION PRN
Status: CANCELLED | OUTPATIENT
Start: 2024-07-07

## 2024-07-06 RX ORDER — 0.9 % SODIUM CHLORIDE 0.9 %
VIAL (ML) INJECTION PRN
Status: CANCELLED | OUTPATIENT
Start: 2024-07-07

## 2024-07-06 RX ADMIN — ERTAPENEM 1000 MG: 1 INJECTION, POWDER, LYOPHILIZED, FOR SOLUTION INTRAMUSCULAR; INTRAVENOUS at 17:39

## 2024-07-06 ASSESSMENT — FIBROSIS 4 INDEX: FIB4 SCORE: 1.6

## 2024-07-07 ENCOUNTER — OUTPATIENT INFUSION SERVICES (OUTPATIENT)
Dept: ONCOLOGY | Facility: MEDICAL CENTER | Age: 74
End: 2024-07-07
Attending: NURSE PRACTITIONER
Payer: MEDICARE

## 2024-07-07 VITALS
TEMPERATURE: 98.9 F | HEART RATE: 72 BPM | RESPIRATION RATE: 18 BRPM | DIASTOLIC BLOOD PRESSURE: 85 MMHG | SYSTOLIC BLOOD PRESSURE: 146 MMHG | OXYGEN SATURATION: 94 %

## 2024-07-07 DIAGNOSIS — M86.09 ACUTE HEMATOGENOUS OSTEOMYELITIS OF MULTIPLE SITES (HCC): ICD-10-CM

## 2024-07-07 PROCEDURE — 96365 THER/PROPH/DIAG IV INF INIT: CPT

## 2024-07-07 PROCEDURE — 700105 HCHG RX REV CODE 258: Performed by: NURSE PRACTITIONER

## 2024-07-07 PROCEDURE — 700111 HCHG RX REV CODE 636 W/ 250 OVERRIDE (IP): Performed by: NURSE PRACTITIONER

## 2024-07-07 RX ORDER — 0.9 % SODIUM CHLORIDE 0.9 %
VIAL (ML) INJECTION PRN
Status: CANCELLED | OUTPATIENT
Start: 2024-07-08

## 2024-07-07 RX ORDER — 0.9 % SODIUM CHLORIDE 0.9 %
10 VIAL (ML) INJECTION PRN
Status: CANCELLED | OUTPATIENT
Start: 2024-07-08

## 2024-07-07 RX ORDER — 0.9 % SODIUM CHLORIDE 0.9 %
3 VIAL (ML) INJECTION PRN
Status: CANCELLED | OUTPATIENT
Start: 2024-07-08

## 2024-07-07 RX ADMIN — ERTAPENEM 1000 MG: 1 INJECTION, POWDER, LYOPHILIZED, FOR SOLUTION INTRAMUSCULAR; INTRAVENOUS at 16:08

## 2024-07-08 ENCOUNTER — OUTPATIENT INFUSION SERVICES (OUTPATIENT)
Dept: ONCOLOGY | Facility: MEDICAL CENTER | Age: 74
End: 2024-07-08
Attending: NURSE PRACTITIONER
Payer: MEDICARE

## 2024-07-08 VITALS
WEIGHT: 214.07 LBS | OXYGEN SATURATION: 95 % | BODY MASS INDEX: 30.72 KG/M2 | RESPIRATION RATE: 18 BRPM | SYSTOLIC BLOOD PRESSURE: 154 MMHG | TEMPERATURE: 98 F | DIASTOLIC BLOOD PRESSURE: 85 MMHG | HEART RATE: 82 BPM

## 2024-07-08 DIAGNOSIS — M86.09 ACUTE HEMATOGENOUS OSTEOMYELITIS OF MULTIPLE SITES (HCC): ICD-10-CM

## 2024-07-08 LAB
ALBUMIN SERPL BCP-MCNC: 3.4 G/DL (ref 3.2–4.9)
ALBUMIN/GLOB SERPL: 0.9 G/DL
ALP SERPL-CCNC: 95 U/L (ref 30–99)
ALT SERPL-CCNC: 8 U/L (ref 2–50)
ANION GAP SERPL CALC-SCNC: 11 MMOL/L (ref 7–16)
AST SERPL-CCNC: 12 U/L (ref 12–45)
BASOPHILS # BLD AUTO: 0.3 % (ref 0–1.8)
BASOPHILS # BLD: 0.02 K/UL (ref 0–0.12)
BILIRUB SERPL-MCNC: 0.5 MG/DL (ref 0.1–1.5)
BUN SERPL-MCNC: 15 MG/DL (ref 8–22)
CALCIUM ALBUM COR SERPL-MCNC: 9.7 MG/DL (ref 8.5–10.5)
CALCIUM SERPL-MCNC: 9.2 MG/DL (ref 8.5–10.5)
CHLORIDE SERPL-SCNC: 104 MMOL/L (ref 96–112)
CO2 SERPL-SCNC: 22 MMOL/L (ref 20–33)
CREAT SERPL-MCNC: 0.52 MG/DL (ref 0.5–1.4)
EOSINOPHIL # BLD AUTO: 0.27 K/UL (ref 0–0.51)
EOSINOPHIL NFR BLD: 4.2 % (ref 0–6.9)
ERYTHROCYTE [DISTWIDTH] IN BLOOD BY AUTOMATED COUNT: 64.6 FL (ref 35.9–50)
GFR SERPLBLD CREATININE-BSD FMLA CKD-EPI: 106 ML/MIN/1.73 M 2
GLOBULIN SER CALC-MCNC: 3.6 G/DL (ref 1.9–3.5)
GLUCOSE SERPL-MCNC: 121 MG/DL (ref 65–99)
HCT VFR BLD AUTO: 34.6 % (ref 42–52)
HGB BLD-MCNC: 10.9 G/DL (ref 14–18)
IMM GRANULOCYTES # BLD AUTO: 0.02 K/UL (ref 0–0.11)
IMM GRANULOCYTES NFR BLD AUTO: 0.3 % (ref 0–0.9)
LYMPHOCYTES # BLD AUTO: 1.51 K/UL (ref 1–4.8)
LYMPHOCYTES NFR BLD: 23.5 % (ref 22–41)
MCH RBC QN AUTO: 31 PG (ref 27–33)
MCHC RBC AUTO-ENTMCNC: 31.5 G/DL (ref 32.3–36.5)
MCV RBC AUTO: 98.3 FL (ref 81.4–97.8)
MONOCYTES # BLD AUTO: 0.7 K/UL (ref 0–0.85)
MONOCYTES NFR BLD AUTO: 10.9 % (ref 0–13.4)
NEUTROPHILS # BLD AUTO: 3.9 K/UL (ref 1.82–7.42)
NEUTROPHILS NFR BLD: 60.8 % (ref 44–72)
NRBC # BLD AUTO: 0 K/UL
NRBC BLD-RTO: 0 /100 WBC (ref 0–0.2)
OUTPT INFUS CBC COMMENT OICOM: ABNORMAL
PLATELET # BLD AUTO: 196 K/UL (ref 164–446)
PMV BLD AUTO: 9 FL (ref 9–12.9)
POTASSIUM SERPL-SCNC: 3.9 MMOL/L (ref 3.6–5.5)
PROT SERPL-MCNC: 7 G/DL (ref 6–8.2)
RBC # BLD AUTO: 3.52 M/UL (ref 4.7–6.1)
SODIUM SERPL-SCNC: 137 MMOL/L (ref 135–145)
WBC # BLD AUTO: 6.4 K/UL (ref 4.8–10.8)

## 2024-07-08 PROCEDURE — 700111 HCHG RX REV CODE 636 W/ 250 OVERRIDE (IP): Performed by: NURSE PRACTITIONER

## 2024-07-08 PROCEDURE — 96365 THER/PROPH/DIAG IV INF INIT: CPT

## 2024-07-08 PROCEDURE — 80053 COMPREHEN METABOLIC PANEL: CPT

## 2024-07-08 PROCEDURE — 85025 COMPLETE CBC W/AUTO DIFF WBC: CPT

## 2024-07-08 PROCEDURE — 700105 HCHG RX REV CODE 258: Performed by: NURSE PRACTITIONER

## 2024-07-08 RX ORDER — 0.9 % SODIUM CHLORIDE 0.9 %
3 VIAL (ML) INJECTION PRN
Status: CANCELLED | OUTPATIENT
Start: 2024-07-09

## 2024-07-08 RX ORDER — 0.9 % SODIUM CHLORIDE 0.9 %
10 VIAL (ML) INJECTION PRN
Status: CANCELLED | OUTPATIENT
Start: 2024-07-09

## 2024-07-08 RX ORDER — 0.9 % SODIUM CHLORIDE 0.9 %
VIAL (ML) INJECTION PRN
Status: CANCELLED | OUTPATIENT
Start: 2024-07-09

## 2024-07-08 RX ADMIN — ERTAPENEM 1000 MG: 1 INJECTION, POWDER, LYOPHILIZED, FOR SOLUTION INTRAMUSCULAR; INTRAVENOUS at 17:42

## 2024-07-08 ASSESSMENT — FIBROSIS 4 INDEX: FIB4 SCORE: 1.6

## 2024-07-09 ENCOUNTER — OUTPATIENT INFUSION SERVICES (OUTPATIENT)
Dept: ONCOLOGY | Facility: MEDICAL CENTER | Age: 74
End: 2024-07-09
Attending: NURSE PRACTITIONER
Payer: MEDICARE

## 2024-07-09 VITALS
TEMPERATURE: 99.4 F | RESPIRATION RATE: 16 BRPM | OXYGEN SATURATION: 96 % | DIASTOLIC BLOOD PRESSURE: 68 MMHG | HEART RATE: 81 BPM | SYSTOLIC BLOOD PRESSURE: 124 MMHG

## 2024-07-09 DIAGNOSIS — M86.09 ACUTE HEMATOGENOUS OSTEOMYELITIS OF MULTIPLE SITES (HCC): ICD-10-CM

## 2024-07-09 PROCEDURE — 96365 THER/PROPH/DIAG IV INF INIT: CPT

## 2024-07-09 PROCEDURE — 700105 HCHG RX REV CODE 258: Performed by: NURSE PRACTITIONER

## 2024-07-09 PROCEDURE — 700111 HCHG RX REV CODE 636 W/ 250 OVERRIDE (IP): Performed by: NURSE PRACTITIONER

## 2024-07-09 RX ORDER — 0.9 % SODIUM CHLORIDE 0.9 %
VIAL (ML) INJECTION PRN
Status: CANCELLED | OUTPATIENT
Start: 2024-07-10

## 2024-07-09 RX ORDER — 0.9 % SODIUM CHLORIDE 0.9 %
10 VIAL (ML) INJECTION PRN
Status: CANCELLED | OUTPATIENT
Start: 2024-07-10

## 2024-07-09 RX ORDER — 0.9 % SODIUM CHLORIDE 0.9 %
3 VIAL (ML) INJECTION PRN
Status: CANCELLED | OUTPATIENT
Start: 2024-07-10

## 2024-07-09 RX ADMIN — ERTAPENEM 1000 MG: 1 INJECTION, POWDER, LYOPHILIZED, FOR SOLUTION INTRAMUSCULAR; INTRAVENOUS at 17:04

## 2024-07-10 ENCOUNTER — OUTPATIENT INFUSION SERVICES (OUTPATIENT)
Dept: ONCOLOGY | Facility: MEDICAL CENTER | Age: 74
End: 2024-07-10
Attending: NURSE PRACTITIONER
Payer: MEDICARE

## 2024-07-10 ENCOUNTER — OFFICE VISIT (OUTPATIENT)
Dept: WOUND CARE | Facility: MEDICAL CENTER | Age: 74
End: 2024-07-10
Payer: MEDICARE

## 2024-07-10 VITALS
DIASTOLIC BLOOD PRESSURE: 60 MMHG | SYSTOLIC BLOOD PRESSURE: 110 MMHG | RESPIRATION RATE: 15 BRPM | HEART RATE: 66 BPM | TEMPERATURE: 98.6 F | OXYGEN SATURATION: 97 %

## 2024-07-10 VITALS — TEMPERATURE: 98 F | RESPIRATION RATE: 18 BRPM | OXYGEN SATURATION: 96 % | HEART RATE: 59 BPM

## 2024-07-10 DIAGNOSIS — G82.54 QUADRIPLEGIA, C5-C7, INCOMPLETE (HCC): ICD-10-CM

## 2024-07-10 DIAGNOSIS — L89.324 PRESSURE INJURY OF LEFT ISCHIUM, STAGE 4 (HCC): ICD-10-CM

## 2024-07-10 DIAGNOSIS — L89.314 PRESSURE INJURY OF RIGHT ISCHIUM, STAGE 4 (HCC): ICD-10-CM

## 2024-07-10 DIAGNOSIS — L89.623 PRESSURE INJURY OF LEFT HEEL, STAGE 3 (HCC): ICD-10-CM

## 2024-07-10 DIAGNOSIS — M86.9 OSTEOMYELITIS OF LEFT LOWER EXTREMITY (HCC): ICD-10-CM

## 2024-07-10 DIAGNOSIS — L89.894 PRESSURE INJURY OF LEFT FOOT, STAGE 4 (HCC): ICD-10-CM

## 2024-07-10 DIAGNOSIS — T81.31XD POSTOPERATIVE WOUND DEHISCENCE, SUBSEQUENT ENCOUNTER: ICD-10-CM

## 2024-07-10 DIAGNOSIS — M86.09 ACUTE HEMATOGENOUS OSTEOMYELITIS OF MULTIPLE SITES (HCC): ICD-10-CM

## 2024-07-10 DIAGNOSIS — L89.893 PRESSURE INJURY OF LEFT LEG, STAGE 3 (HCC): ICD-10-CM

## 2024-07-10 DIAGNOSIS — L89.154 SACRAL DECUBITUS ULCER, STAGE IV (HCC): ICD-10-CM

## 2024-07-10 DIAGNOSIS — M86.18 OTHER ACUTE OSTEOMYELITIS, OTHER SITE (HCC): ICD-10-CM

## 2024-07-10 PROCEDURE — 3074F SYST BP LT 130 MM HG: CPT | Performed by: STUDENT IN AN ORGANIZED HEALTH CARE EDUCATION/TRAINING PROGRAM

## 2024-07-10 PROCEDURE — 11043 DBRDMT MUSC&/FSCA 1ST 20/<: CPT

## 2024-07-10 PROCEDURE — 11043 DBRDMT MUSC&/FSCA 1ST 20/<: CPT | Performed by: STUDENT IN AN ORGANIZED HEALTH CARE EDUCATION/TRAINING PROGRAM

## 2024-07-10 PROCEDURE — 3078F DIAST BP <80 MM HG: CPT | Performed by: STUDENT IN AN ORGANIZED HEALTH CARE EDUCATION/TRAINING PROGRAM

## 2024-07-10 PROCEDURE — 700105 HCHG RX REV CODE 258: Performed by: NURSE PRACTITIONER

## 2024-07-10 PROCEDURE — 11046 DBRDMT MUSC&/FSCA EA ADDL: CPT

## 2024-07-10 PROCEDURE — 11046 DBRDMT MUSC&/FSCA EA ADDL: CPT | Performed by: STUDENT IN AN ORGANIZED HEALTH CARE EDUCATION/TRAINING PROGRAM

## 2024-07-10 PROCEDURE — 96365 THER/PROPH/DIAG IV INF INIT: CPT

## 2024-07-10 PROCEDURE — 700111 HCHG RX REV CODE 636 W/ 250 OVERRIDE (IP): Performed by: NURSE PRACTITIONER

## 2024-07-10 RX ORDER — 0.9 % SODIUM CHLORIDE 0.9 %
3 VIAL (ML) INJECTION PRN
Status: CANCELLED | OUTPATIENT
Start: 2024-07-11

## 2024-07-10 RX ORDER — 0.9 % SODIUM CHLORIDE 0.9 %
VIAL (ML) INJECTION PRN
Status: CANCELLED | OUTPATIENT
Start: 2024-07-11

## 2024-07-10 RX ORDER — 0.9 % SODIUM CHLORIDE 0.9 %
10 VIAL (ML) INJECTION PRN
Status: CANCELLED | OUTPATIENT
Start: 2024-07-11

## 2024-07-10 RX ADMIN — ERTAPENEM 1000 MG: 1 INJECTION, POWDER, LYOPHILIZED, FOR SOLUTION INTRAMUSCULAR; INTRAVENOUS at 16:39

## 2024-07-11 ENCOUNTER — OUTPATIENT INFUSION SERVICES (OUTPATIENT)
Dept: ONCOLOGY | Facility: MEDICAL CENTER | Age: 74
End: 2024-07-11
Attending: NURSE PRACTITIONER
Payer: MEDICARE

## 2024-07-11 VITALS
OXYGEN SATURATION: 97 % | RESPIRATION RATE: 18 BRPM | BODY MASS INDEX: 30.72 KG/M2 | TEMPERATURE: 100.1 F | DIASTOLIC BLOOD PRESSURE: 85 MMHG | WEIGHT: 214.07 LBS | SYSTOLIC BLOOD PRESSURE: 138 MMHG | HEART RATE: 89 BPM

## 2024-07-11 DIAGNOSIS — M86.09 ACUTE HEMATOGENOUS OSTEOMYELITIS OF MULTIPLE SITES (HCC): ICD-10-CM

## 2024-07-11 PROCEDURE — 700105 HCHG RX REV CODE 258: Performed by: NURSE PRACTITIONER

## 2024-07-11 PROCEDURE — 96365 THER/PROPH/DIAG IV INF INIT: CPT

## 2024-07-11 PROCEDURE — 700111 HCHG RX REV CODE 636 W/ 250 OVERRIDE (IP): Performed by: NURSE PRACTITIONER

## 2024-07-11 RX ORDER — 0.9 % SODIUM CHLORIDE 0.9 %
3 VIAL (ML) INJECTION PRN
Status: CANCELLED | OUTPATIENT
Start: 2024-07-12

## 2024-07-11 RX ORDER — 0.9 % SODIUM CHLORIDE 0.9 %
10 VIAL (ML) INJECTION PRN
Status: CANCELLED | OUTPATIENT
Start: 2024-07-12

## 2024-07-11 RX ORDER — 0.9 % SODIUM CHLORIDE 0.9 %
VIAL (ML) INJECTION PRN
Status: CANCELLED | OUTPATIENT
Start: 2024-07-12

## 2024-07-11 RX ADMIN — ERTAPENEM 1000 MG: 1 INJECTION, POWDER, LYOPHILIZED, FOR SOLUTION INTRAMUSCULAR; INTRAVENOUS at 16:37

## 2024-07-11 ASSESSMENT — FIBROSIS 4 INDEX: FIB4 SCORE: 1.6

## 2024-07-12 ENCOUNTER — OUTPATIENT INFUSION SERVICES (OUTPATIENT)
Dept: ONCOLOGY | Facility: MEDICAL CENTER | Age: 74
End: 2024-07-12
Attending: NURSE PRACTITIONER
Payer: MEDICARE

## 2024-07-12 VITALS
HEART RATE: 89 BPM | TEMPERATURE: 98.2 F | OXYGEN SATURATION: 98 % | RESPIRATION RATE: 18 BRPM | SYSTOLIC BLOOD PRESSURE: 116 MMHG | DIASTOLIC BLOOD PRESSURE: 72 MMHG

## 2024-07-12 DIAGNOSIS — M86.09 ACUTE HEMATOGENOUS OSTEOMYELITIS OF MULTIPLE SITES (HCC): ICD-10-CM

## 2024-07-12 PROCEDURE — 700105 HCHG RX REV CODE 258: Performed by: NURSE PRACTITIONER

## 2024-07-12 PROCEDURE — 96365 THER/PROPH/DIAG IV INF INIT: CPT

## 2024-07-12 PROCEDURE — 700111 HCHG RX REV CODE 636 W/ 250 OVERRIDE (IP): Performed by: NURSE PRACTITIONER

## 2024-07-12 RX ORDER — 0.9 % SODIUM CHLORIDE 0.9 %
10 VIAL (ML) INJECTION PRN
Status: CANCELLED | OUTPATIENT
Start: 2024-07-13

## 2024-07-12 RX ORDER — 0.9 % SODIUM CHLORIDE 0.9 %
3 VIAL (ML) INJECTION PRN
Status: CANCELLED | OUTPATIENT
Start: 2024-07-13

## 2024-07-12 RX ORDER — 0.9 % SODIUM CHLORIDE 0.9 %
VIAL (ML) INJECTION PRN
Status: CANCELLED | OUTPATIENT
Start: 2024-07-13

## 2024-07-12 RX ADMIN — ERTAPENEM 1000 MG: 1 INJECTION, POWDER, LYOPHILIZED, FOR SOLUTION INTRAMUSCULAR; INTRAVENOUS at 16:37

## 2024-07-13 ENCOUNTER — OUTPATIENT INFUSION SERVICES (OUTPATIENT)
Dept: ONCOLOGY | Facility: MEDICAL CENTER | Age: 74
End: 2024-07-13
Attending: NURSE PRACTITIONER
Payer: MEDICARE

## 2024-07-13 VITALS
DIASTOLIC BLOOD PRESSURE: 73 MMHG | RESPIRATION RATE: 18 BRPM | SYSTOLIC BLOOD PRESSURE: 124 MMHG | OXYGEN SATURATION: 94 % | HEART RATE: 82 BPM | TEMPERATURE: 97.7 F

## 2024-07-13 DIAGNOSIS — M86.09 ACUTE HEMATOGENOUS OSTEOMYELITIS OF MULTIPLE SITES (HCC): ICD-10-CM

## 2024-07-13 PROCEDURE — 700105 HCHG RX REV CODE 258: Performed by: NURSE PRACTITIONER

## 2024-07-13 PROCEDURE — 700111 HCHG RX REV CODE 636 W/ 250 OVERRIDE (IP): Performed by: NURSE PRACTITIONER

## 2024-07-13 PROCEDURE — 96365 THER/PROPH/DIAG IV INF INIT: CPT

## 2024-07-13 RX ORDER — 0.9 % SODIUM CHLORIDE 0.9 %
3 VIAL (ML) INJECTION PRN
Status: CANCELLED | OUTPATIENT
Start: 2024-07-14

## 2024-07-13 RX ORDER — 0.9 % SODIUM CHLORIDE 0.9 %
10 VIAL (ML) INJECTION PRN
Status: CANCELLED | OUTPATIENT
Start: 2024-07-14

## 2024-07-13 RX ORDER — 0.9 % SODIUM CHLORIDE 0.9 %
VIAL (ML) INJECTION PRN
Status: CANCELLED | OUTPATIENT
Start: 2024-07-14

## 2024-07-13 RX ADMIN — ERTAPENEM 1000 MG: 1 INJECTION, POWDER, LYOPHILIZED, FOR SOLUTION INTRAMUSCULAR; INTRAVENOUS at 16:19

## 2024-07-14 ENCOUNTER — OUTPATIENT INFUSION SERVICES (OUTPATIENT)
Dept: ONCOLOGY | Facility: MEDICAL CENTER | Age: 74
End: 2024-07-14
Attending: NURSE PRACTITIONER
Payer: MEDICARE

## 2024-07-14 VITALS
WEIGHT: 214.07 LBS | DIASTOLIC BLOOD PRESSURE: 86 MMHG | SYSTOLIC BLOOD PRESSURE: 148 MMHG | OXYGEN SATURATION: 95 % | RESPIRATION RATE: 18 BRPM | HEART RATE: 69 BPM | TEMPERATURE: 98.3 F | BODY MASS INDEX: 30.72 KG/M2

## 2024-07-14 DIAGNOSIS — M86.09 ACUTE HEMATOGENOUS OSTEOMYELITIS OF MULTIPLE SITES (HCC): ICD-10-CM

## 2024-07-14 PROCEDURE — 700105 HCHG RX REV CODE 258: Performed by: NURSE PRACTITIONER

## 2024-07-14 PROCEDURE — 96365 THER/PROPH/DIAG IV INF INIT: CPT

## 2024-07-14 PROCEDURE — 700111 HCHG RX REV CODE 636 W/ 250 OVERRIDE (IP): Performed by: NURSE PRACTITIONER

## 2024-07-14 RX ORDER — 0.9 % SODIUM CHLORIDE 0.9 %
3 VIAL (ML) INJECTION PRN
Status: CANCELLED | OUTPATIENT
Start: 2024-07-15

## 2024-07-14 RX ORDER — 0.9 % SODIUM CHLORIDE 0.9 %
10 VIAL (ML) INJECTION PRN
Status: CANCELLED | OUTPATIENT
Start: 2024-07-15

## 2024-07-14 RX ORDER — 0.9 % SODIUM CHLORIDE 0.9 %
VIAL (ML) INJECTION PRN
Status: CANCELLED | OUTPATIENT
Start: 2024-07-15

## 2024-07-14 RX ADMIN — ERTAPENEM 1000 MG: 1 INJECTION, POWDER, LYOPHILIZED, FOR SOLUTION INTRAMUSCULAR; INTRAVENOUS at 15:12

## 2024-07-14 ASSESSMENT — FIBROSIS 4 INDEX: FIB4 SCORE: 1.6

## 2024-07-15 ENCOUNTER — OUTPATIENT INFUSION SERVICES (OUTPATIENT)
Dept: ONCOLOGY | Facility: MEDICAL CENTER | Age: 74
End: 2024-07-15
Attending: NURSE PRACTITIONER
Payer: MEDICARE

## 2024-07-15 VITALS
OXYGEN SATURATION: 95 % | SYSTOLIC BLOOD PRESSURE: 149 MMHG | HEART RATE: 57 BPM | DIASTOLIC BLOOD PRESSURE: 86 MMHG | RESPIRATION RATE: 16 BRPM | TEMPERATURE: 98.1 F

## 2024-07-15 DIAGNOSIS — M86.09 ACUTE HEMATOGENOUS OSTEOMYELITIS OF MULTIPLE SITES (HCC): ICD-10-CM

## 2024-07-15 LAB
ALBUMIN SERPL BCP-MCNC: 3.3 G/DL (ref 3.2–4.9)
ALBUMIN/GLOB SERPL: 0.9 G/DL
ALP SERPL-CCNC: 95 U/L (ref 30–99)
ALT SERPL-CCNC: 7 U/L (ref 2–50)
ANION GAP SERPL CALC-SCNC: 12 MMOL/L (ref 7–16)
AST SERPL-CCNC: 12 U/L (ref 12–45)
BASOPHILS # BLD AUTO: 0.3 % (ref 0–1.8)
BASOPHILS # BLD: 0.02 K/UL (ref 0–0.12)
BILIRUB SERPL-MCNC: 0.6 MG/DL (ref 0.1–1.5)
BUN SERPL-MCNC: 17 MG/DL (ref 8–22)
CALCIUM ALBUM COR SERPL-MCNC: 9.4 MG/DL (ref 8.5–10.5)
CALCIUM SERPL-MCNC: 8.8 MG/DL (ref 8.5–10.5)
CHLORIDE SERPL-SCNC: 104 MMOL/L (ref 96–112)
CO2 SERPL-SCNC: 22 MMOL/L (ref 20–33)
CREAT SERPL-MCNC: 0.42 MG/DL (ref 0.5–1.4)
CRP SERPL HS-MCNC: 4.34 MG/DL (ref 0–0.75)
EOSINOPHIL # BLD AUTO: 0.35 K/UL (ref 0–0.51)
EOSINOPHIL NFR BLD: 5.7 % (ref 0–6.9)
ERYTHROCYTE [DISTWIDTH] IN BLOOD BY AUTOMATED COUNT: 62.6 FL (ref 35.9–50)
ERYTHROCYTE [SEDIMENTATION RATE] IN BLOOD BY WESTERGREN METHOD: 93 MM/HOUR (ref 0–20)
GFR SERPLBLD CREATININE-BSD FMLA CKD-EPI: 113 ML/MIN/1.73 M 2
GLOBULIN SER CALC-MCNC: 3.5 G/DL (ref 1.9–3.5)
GLUCOSE SERPL-MCNC: 90 MG/DL (ref 65–99)
HCT VFR BLD AUTO: 33.8 % (ref 42–52)
HGB BLD-MCNC: 10.8 G/DL (ref 14–18)
IMM GRANULOCYTES # BLD AUTO: 0.03 K/UL (ref 0–0.11)
IMM GRANULOCYTES NFR BLD AUTO: 0.5 % (ref 0–0.9)
LYMPHOCYTES # BLD AUTO: 1.48 K/UL (ref 1–4.8)
LYMPHOCYTES NFR BLD: 24 % (ref 22–41)
MCH RBC QN AUTO: 31.2 PG (ref 27–33)
MCHC RBC AUTO-ENTMCNC: 32 G/DL (ref 32.3–36.5)
MCV RBC AUTO: 97.7 FL (ref 81.4–97.8)
MONOCYTES # BLD AUTO: 0.7 K/UL (ref 0–0.85)
MONOCYTES NFR BLD AUTO: 11.3 % (ref 0–13.4)
NEUTROPHILS # BLD AUTO: 3.59 K/UL (ref 1.82–7.42)
NEUTROPHILS NFR BLD: 58.2 % (ref 44–72)
NRBC # BLD AUTO: 0 K/UL
NRBC BLD-RTO: 0 /100 WBC (ref 0–0.2)
OUTPT INFUS CBC COMMENT OICOM: ABNORMAL
PLATELET # BLD AUTO: 184 K/UL (ref 164–446)
PMV BLD AUTO: 8.7 FL (ref 9–12.9)
POTASSIUM SERPL-SCNC: 3.8 MMOL/L (ref 3.6–5.5)
PROT SERPL-MCNC: 6.8 G/DL (ref 6–8.2)
RBC # BLD AUTO: 3.46 M/UL (ref 4.7–6.1)
SODIUM SERPL-SCNC: 138 MMOL/L (ref 135–145)
WBC # BLD AUTO: 6.2 K/UL (ref 4.8–10.8)

## 2024-07-15 PROCEDURE — 36592 COLLECT BLOOD FROM PICC: CPT

## 2024-07-15 PROCEDURE — 85025 COMPLETE CBC W/AUTO DIFF WBC: CPT

## 2024-07-15 PROCEDURE — 700105 HCHG RX REV CODE 258: Performed by: NURSE PRACTITIONER

## 2024-07-15 PROCEDURE — 700111 HCHG RX REV CODE 636 W/ 250 OVERRIDE (IP): Performed by: NURSE PRACTITIONER

## 2024-07-15 PROCEDURE — 80053 COMPREHEN METABOLIC PANEL: CPT

## 2024-07-15 PROCEDURE — 85652 RBC SED RATE AUTOMATED: CPT

## 2024-07-15 PROCEDURE — 86140 C-REACTIVE PROTEIN: CPT

## 2024-07-15 PROCEDURE — 96365 THER/PROPH/DIAG IV INF INIT: CPT

## 2024-07-15 RX ORDER — 0.9 % SODIUM CHLORIDE 0.9 %
10 VIAL (ML) INJECTION PRN
Status: CANCELLED | OUTPATIENT
Start: 2024-07-16

## 2024-07-15 RX ORDER — 0.9 % SODIUM CHLORIDE 0.9 %
3 VIAL (ML) INJECTION PRN
Status: CANCELLED | OUTPATIENT
Start: 2024-07-16

## 2024-07-15 RX ORDER — 0.9 % SODIUM CHLORIDE 0.9 %
VIAL (ML) INJECTION PRN
Status: CANCELLED | OUTPATIENT
Start: 2024-07-16

## 2024-07-15 RX ADMIN — ERTAPENEM 1000 MG: 1 INJECTION, POWDER, LYOPHILIZED, FOR SOLUTION INTRAMUSCULAR; INTRAVENOUS at 17:47

## 2024-07-16 ENCOUNTER — OUTPATIENT INFUSION SERVICES (OUTPATIENT)
Dept: ONCOLOGY | Facility: MEDICAL CENTER | Age: 74
End: 2024-07-16
Attending: NURSE PRACTITIONER
Payer: MEDICARE

## 2024-07-16 VITALS
OXYGEN SATURATION: 96 % | WEIGHT: 214.07 LBS | HEART RATE: 79 BPM | RESPIRATION RATE: 16 BRPM | BODY MASS INDEX: 30.72 KG/M2 | TEMPERATURE: 99.3 F | DIASTOLIC BLOOD PRESSURE: 66 MMHG | SYSTOLIC BLOOD PRESSURE: 106 MMHG

## 2024-07-16 DIAGNOSIS — M86.09 ACUTE HEMATOGENOUS OSTEOMYELITIS OF MULTIPLE SITES (HCC): ICD-10-CM

## 2024-07-16 PROCEDURE — 700111 HCHG RX REV CODE 636 W/ 250 OVERRIDE (IP): Performed by: NURSE PRACTITIONER

## 2024-07-16 PROCEDURE — 700105 HCHG RX REV CODE 258: Performed by: NURSE PRACTITIONER

## 2024-07-16 RX ORDER — 0.9 % SODIUM CHLORIDE 0.9 %
10 VIAL (ML) INJECTION PRN
Status: CANCELLED | OUTPATIENT
Start: 2024-07-17

## 2024-07-16 RX ORDER — 0.9 % SODIUM CHLORIDE 0.9 %
3 VIAL (ML) INJECTION PRN
Status: CANCELLED | OUTPATIENT
Start: 2024-07-17

## 2024-07-16 RX ORDER — 0.9 % SODIUM CHLORIDE 0.9 %
VIAL (ML) INJECTION PRN
Status: CANCELLED | OUTPATIENT
Start: 2024-07-17

## 2024-07-16 RX ADMIN — ERTAPENEM 1000 MG: 1 INJECTION, POWDER, LYOPHILIZED, FOR SOLUTION INTRAMUSCULAR; INTRAVENOUS at 17:20

## 2024-07-16 ASSESSMENT — FIBROSIS 4 INDEX: FIB4 SCORE: 1.82

## 2024-07-17 ENCOUNTER — OFFICE VISIT (OUTPATIENT)
Dept: WOUND CARE | Facility: MEDICAL CENTER | Age: 74
End: 2024-07-17
Attending: INTERNAL MEDICINE
Payer: MEDICARE

## 2024-07-17 ENCOUNTER — OUTPATIENT INFUSION SERVICES (OUTPATIENT)
Dept: ONCOLOGY | Facility: MEDICAL CENTER | Age: 74
End: 2024-07-17
Attending: NURSE PRACTITIONER
Payer: MEDICARE

## 2024-07-17 VITALS
SYSTOLIC BLOOD PRESSURE: 126 MMHG | OXYGEN SATURATION: 92 % | HEART RATE: 85 BPM | DIASTOLIC BLOOD PRESSURE: 80 MMHG | TEMPERATURE: 98.4 F | RESPIRATION RATE: 16 BRPM

## 2024-07-17 VITALS
OXYGEN SATURATION: 95 % | TEMPERATURE: 98.1 F | SYSTOLIC BLOOD PRESSURE: 110 MMHG | DIASTOLIC BLOOD PRESSURE: 78 MMHG | RESPIRATION RATE: 16 BRPM | HEART RATE: 82 BPM

## 2024-07-17 DIAGNOSIS — L89.324 PRESSURE INJURY OF LEFT ISCHIUM, STAGE 4 (HCC): ICD-10-CM

## 2024-07-17 DIAGNOSIS — L89.623 PRESSURE INJURY OF LEFT HEEL, STAGE 3 (HCC): ICD-10-CM

## 2024-07-17 DIAGNOSIS — L89.314 PRESSURE INJURY OF RIGHT ISCHIUM, STAGE 4 (HCC): ICD-10-CM

## 2024-07-17 DIAGNOSIS — L89.154 SACRAL DECUBITUS ULCER, STAGE IV (HCC): ICD-10-CM

## 2024-07-17 DIAGNOSIS — T81.31XD POSTOPERATIVE WOUND DEHISCENCE, SUBSEQUENT ENCOUNTER: ICD-10-CM

## 2024-07-17 DIAGNOSIS — M86.09 ACUTE HEMATOGENOUS OSTEOMYELITIS OF MULTIPLE SITES (HCC): ICD-10-CM

## 2024-07-17 DIAGNOSIS — M86.18 OTHER ACUTE OSTEOMYELITIS, OTHER SITE (HCC): ICD-10-CM

## 2024-07-17 DIAGNOSIS — L89.894 PRESSURE INJURY OF LEFT FOOT, STAGE 4 (HCC): ICD-10-CM

## 2024-07-17 DIAGNOSIS — L89.893 PRESSURE INJURY OF LEFT LEG, STAGE 3 (HCC): ICD-10-CM

## 2024-07-17 DIAGNOSIS — M86.9 OSTEOMYELITIS OF LEFT LOWER EXTREMITY (HCC): ICD-10-CM

## 2024-07-17 DIAGNOSIS — G82.54 QUADRIPLEGIA, C5-C7, INCOMPLETE (HCC): ICD-10-CM

## 2024-07-17 PROCEDURE — 11043 DBRDMT MUSC&/FSCA 1ST 20/<: CPT

## 2024-07-17 PROCEDURE — 11046 DBRDMT MUSC&/FSCA EA ADDL: CPT | Performed by: NURSE PRACTITIONER

## 2024-07-17 PROCEDURE — 11046 DBRDMT MUSC&/FSCA EA ADDL: CPT

## 2024-07-17 PROCEDURE — 700105 HCHG RX REV CODE 258: Performed by: NURSE PRACTITIONER

## 2024-07-17 PROCEDURE — 3074F SYST BP LT 130 MM HG: CPT | Performed by: NURSE PRACTITIONER

## 2024-07-17 PROCEDURE — 96365 THER/PROPH/DIAG IV INF INIT: CPT

## 2024-07-17 PROCEDURE — 11043 DBRDMT MUSC&/FSCA 1ST 20/<: CPT | Performed by: NURSE PRACTITIONER

## 2024-07-17 PROCEDURE — 3078F DIAST BP <80 MM HG: CPT | Performed by: NURSE PRACTITIONER

## 2024-07-17 PROCEDURE — 700111 HCHG RX REV CODE 636 W/ 250 OVERRIDE (IP): Performed by: NURSE PRACTITIONER

## 2024-07-17 RX ORDER — 0.9 % SODIUM CHLORIDE 0.9 %
10 VIAL (ML) INJECTION PRN
Status: CANCELLED | OUTPATIENT
Start: 2024-07-18

## 2024-07-17 RX ORDER — 0.9 % SODIUM CHLORIDE 0.9 %
3 VIAL (ML) INJECTION PRN
Status: CANCELLED | OUTPATIENT
Start: 2024-07-18

## 2024-07-17 RX ORDER — 0.9 % SODIUM CHLORIDE 0.9 %
VIAL (ML) INJECTION PRN
Status: CANCELLED | OUTPATIENT
Start: 2024-07-18

## 2024-07-17 RX ADMIN — ERTAPENEM 1000 MG: 1 INJECTION, POWDER, LYOPHILIZED, FOR SOLUTION INTRAMUSCULAR; INTRAVENOUS at 17:22

## 2024-07-18 ENCOUNTER — OUTPATIENT INFUSION SERVICES (OUTPATIENT)
Dept: ONCOLOGY | Facility: MEDICAL CENTER | Age: 74
End: 2024-07-18
Attending: NURSE PRACTITIONER
Payer: MEDICARE

## 2024-07-18 VITALS
SYSTOLIC BLOOD PRESSURE: 132 MMHG | OXYGEN SATURATION: 95 % | HEART RATE: 77 BPM | TEMPERATURE: 98.1 F | DIASTOLIC BLOOD PRESSURE: 82 MMHG | RESPIRATION RATE: 16 BRPM

## 2024-07-18 DIAGNOSIS — M86.09 ACUTE HEMATOGENOUS OSTEOMYELITIS OF MULTIPLE SITES (HCC): ICD-10-CM

## 2024-07-18 PROCEDURE — 700105 HCHG RX REV CODE 258: Performed by: NURSE PRACTITIONER

## 2024-07-18 PROCEDURE — 96365 THER/PROPH/DIAG IV INF INIT: CPT

## 2024-07-18 PROCEDURE — 700111 HCHG RX REV CODE 636 W/ 250 OVERRIDE (IP): Performed by: NURSE PRACTITIONER

## 2024-07-18 RX ORDER — 0.9 % SODIUM CHLORIDE 0.9 %
10 VIAL (ML) INJECTION PRN
Status: CANCELLED | OUTPATIENT
Start: 2024-07-19

## 2024-07-18 RX ORDER — 0.9 % SODIUM CHLORIDE 0.9 %
VIAL (ML) INJECTION PRN
Status: CANCELLED | OUTPATIENT
Start: 2024-07-19

## 2024-07-18 RX ORDER — 0.9 % SODIUM CHLORIDE 0.9 %
3 VIAL (ML) INJECTION PRN
Status: CANCELLED | OUTPATIENT
Start: 2024-07-19

## 2024-07-18 RX ADMIN — ERTAPENEM 1000 MG: 1 INJECTION, POWDER, LYOPHILIZED, FOR SOLUTION INTRAMUSCULAR; INTRAVENOUS at 16:26

## 2024-07-19 ENCOUNTER — OUTPATIENT INFUSION SERVICES (OUTPATIENT)
Dept: ONCOLOGY | Facility: MEDICAL CENTER | Age: 74
End: 2024-07-19
Attending: NURSE PRACTITIONER
Payer: MEDICARE

## 2024-07-19 VITALS
SYSTOLIC BLOOD PRESSURE: 141 MMHG | WEIGHT: 214.07 LBS | RESPIRATION RATE: 16 BRPM | OXYGEN SATURATION: 95 % | BODY MASS INDEX: 30.72 KG/M2 | HEART RATE: 73 BPM | TEMPERATURE: 97.9 F | DIASTOLIC BLOOD PRESSURE: 82 MMHG

## 2024-07-19 DIAGNOSIS — M86.09 ACUTE HEMATOGENOUS OSTEOMYELITIS OF MULTIPLE SITES (HCC): ICD-10-CM

## 2024-07-19 PROCEDURE — 96365 THER/PROPH/DIAG IV INF INIT: CPT

## 2024-07-19 PROCEDURE — 700105 HCHG RX REV CODE 258: Performed by: NURSE PRACTITIONER

## 2024-07-19 PROCEDURE — 700111 HCHG RX REV CODE 636 W/ 250 OVERRIDE (IP): Performed by: NURSE PRACTITIONER

## 2024-07-19 RX ORDER — 0.9 % SODIUM CHLORIDE 0.9 %
10 VIAL (ML) INJECTION PRN
Status: CANCELLED | OUTPATIENT
Start: 2024-07-20

## 2024-07-19 RX ORDER — 0.9 % SODIUM CHLORIDE 0.9 %
3 VIAL (ML) INJECTION PRN
Status: CANCELLED | OUTPATIENT
Start: 2024-07-20

## 2024-07-19 RX ORDER — 0.9 % SODIUM CHLORIDE 0.9 %
VIAL (ML) INJECTION PRN
Status: CANCELLED | OUTPATIENT
Start: 2024-07-20

## 2024-07-19 RX ADMIN — ERTAPENEM 1000 MG: 1 INJECTION, POWDER, LYOPHILIZED, FOR SOLUTION INTRAMUSCULAR; INTRAVENOUS at 16:42

## 2024-07-19 ASSESSMENT — FIBROSIS 4 INDEX: FIB4 SCORE: 1.82

## 2024-07-20 ENCOUNTER — OUTPATIENT INFUSION SERVICES (OUTPATIENT)
Dept: ONCOLOGY | Facility: MEDICAL CENTER | Age: 74
End: 2024-07-20
Attending: NURSE PRACTITIONER
Payer: MEDICARE

## 2024-07-20 VITALS
SYSTOLIC BLOOD PRESSURE: 119 MMHG | TEMPERATURE: 98.9 F | HEART RATE: 78 BPM | DIASTOLIC BLOOD PRESSURE: 76 MMHG | OXYGEN SATURATION: 95 % | RESPIRATION RATE: 16 BRPM

## 2024-07-20 DIAGNOSIS — M86.09 ACUTE HEMATOGENOUS OSTEOMYELITIS OF MULTIPLE SITES (HCC): ICD-10-CM

## 2024-07-20 PROCEDURE — 96365 THER/PROPH/DIAG IV INF INIT: CPT

## 2024-07-20 PROCEDURE — 700105 HCHG RX REV CODE 258: Performed by: NURSE PRACTITIONER

## 2024-07-20 PROCEDURE — 700111 HCHG RX REV CODE 636 W/ 250 OVERRIDE (IP): Performed by: NURSE PRACTITIONER

## 2024-07-20 RX ORDER — 0.9 % SODIUM CHLORIDE 0.9 %
3 VIAL (ML) INJECTION PRN
Status: CANCELLED | OUTPATIENT
Start: 2024-07-21

## 2024-07-20 RX ORDER — 0.9 % SODIUM CHLORIDE 0.9 %
VIAL (ML) INJECTION PRN
Status: CANCELLED | OUTPATIENT
Start: 2024-07-21

## 2024-07-20 RX ORDER — 0.9 % SODIUM CHLORIDE 0.9 %
10 VIAL (ML) INJECTION PRN
Status: CANCELLED | OUTPATIENT
Start: 2024-07-21

## 2024-07-20 RX ADMIN — ERTAPENEM 1000 MG: 1 INJECTION, POWDER, LYOPHILIZED, FOR SOLUTION INTRAMUSCULAR; INTRAVENOUS at 16:11

## 2024-07-21 ENCOUNTER — OUTPATIENT INFUSION SERVICES (OUTPATIENT)
Dept: ONCOLOGY | Facility: MEDICAL CENTER | Age: 74
End: 2024-07-21
Attending: NURSE PRACTITIONER
Payer: MEDICARE

## 2024-07-21 VITALS
OXYGEN SATURATION: 94 % | DIASTOLIC BLOOD PRESSURE: 79 MMHG | RESPIRATION RATE: 16 BRPM | SYSTOLIC BLOOD PRESSURE: 140 MMHG | TEMPERATURE: 97.8 F | HEART RATE: 81 BPM

## 2024-07-21 DIAGNOSIS — M86.09 ACUTE HEMATOGENOUS OSTEOMYELITIS OF MULTIPLE SITES (HCC): ICD-10-CM

## 2024-07-21 PROCEDURE — 700111 HCHG RX REV CODE 636 W/ 250 OVERRIDE (IP): Performed by: NURSE PRACTITIONER

## 2024-07-21 PROCEDURE — 96365 THER/PROPH/DIAG IV INF INIT: CPT

## 2024-07-21 PROCEDURE — 700105 HCHG RX REV CODE 258: Performed by: NURSE PRACTITIONER

## 2024-07-21 RX ORDER — 0.9 % SODIUM CHLORIDE 0.9 %
VIAL (ML) INJECTION PRN
Status: CANCELLED | OUTPATIENT
Start: 2024-07-22

## 2024-07-21 RX ORDER — 0.9 % SODIUM CHLORIDE 0.9 %
10 VIAL (ML) INJECTION PRN
Status: CANCELLED | OUTPATIENT
Start: 2024-07-22

## 2024-07-21 RX ORDER — 0.9 % SODIUM CHLORIDE 0.9 %
3 VIAL (ML) INJECTION PRN
Status: CANCELLED | OUTPATIENT
Start: 2024-07-22

## 2024-07-21 RX ADMIN — ERTAPENEM 1000 MG: 1 INJECTION, POWDER, LYOPHILIZED, FOR SOLUTION INTRAMUSCULAR; INTRAVENOUS at 15:16

## 2024-07-22 ENCOUNTER — OUTPATIENT INFUSION SERVICES (OUTPATIENT)
Dept: ONCOLOGY | Facility: MEDICAL CENTER | Age: 74
End: 2024-07-22
Attending: NURSE PRACTITIONER
Payer: MEDICARE

## 2024-07-22 VITALS
DIASTOLIC BLOOD PRESSURE: 82 MMHG | HEART RATE: 83 BPM | OXYGEN SATURATION: 96 % | BODY MASS INDEX: 30.72 KG/M2 | WEIGHT: 214.07 LBS | TEMPERATURE: 98 F | RESPIRATION RATE: 16 BRPM | SYSTOLIC BLOOD PRESSURE: 123 MMHG

## 2024-07-22 DIAGNOSIS — M86.09 ACUTE HEMATOGENOUS OSTEOMYELITIS OF MULTIPLE SITES (HCC): ICD-10-CM

## 2024-07-22 LAB
ALBUMIN SERPL BCP-MCNC: 3.5 G/DL (ref 3.2–4.9)
ALBUMIN/GLOB SERPL: 1 G/DL
ALP SERPL-CCNC: 98 U/L (ref 30–99)
ALT SERPL-CCNC: 7 U/L (ref 2–50)
ANION GAP SERPL CALC-SCNC: 11 MMOL/L (ref 7–16)
AST SERPL-CCNC: 11 U/L (ref 12–45)
BASOPHILS # BLD AUTO: 0.3 % (ref 0–1.8)
BASOPHILS # BLD: 0.02 K/UL (ref 0–0.12)
BILIRUB SERPL-MCNC: 0.6 MG/DL (ref 0.1–1.5)
BUN SERPL-MCNC: 15 MG/DL (ref 8–22)
CALCIUM ALBUM COR SERPL-MCNC: 9.4 MG/DL (ref 8.5–10.5)
CALCIUM SERPL-MCNC: 9 MG/DL (ref 8.5–10.5)
CHLORIDE SERPL-SCNC: 105 MMOL/L (ref 96–112)
CO2 SERPL-SCNC: 23 MMOL/L (ref 20–33)
CREAT SERPL-MCNC: 0.39 MG/DL (ref 0.5–1.4)
EOSINOPHIL # BLD AUTO: 0.3 K/UL (ref 0–0.51)
EOSINOPHIL NFR BLD: 4.5 % (ref 0–6.9)
ERYTHROCYTE [DISTWIDTH] IN BLOOD BY AUTOMATED COUNT: 62.4 FL (ref 35.9–50)
GFR SERPLBLD CREATININE-BSD FMLA CKD-EPI: 115 ML/MIN/1.73 M 2
GLOBULIN SER CALC-MCNC: 3.6 G/DL (ref 1.9–3.5)
GLUCOSE SERPL-MCNC: 94 MG/DL (ref 65–99)
HCT VFR BLD AUTO: 36.3 % (ref 42–52)
HGB BLD-MCNC: 11.4 G/DL (ref 14–18)
IMM GRANULOCYTES # BLD AUTO: 0.02 K/UL (ref 0–0.11)
IMM GRANULOCYTES NFR BLD AUTO: 0.3 % (ref 0–0.9)
LYMPHOCYTES # BLD AUTO: 1.68 K/UL (ref 1–4.8)
LYMPHOCYTES NFR BLD: 25 % (ref 22–41)
MCH RBC QN AUTO: 30.8 PG (ref 27–33)
MCHC RBC AUTO-ENTMCNC: 31.4 G/DL (ref 32.3–36.5)
MCV RBC AUTO: 98.1 FL (ref 81.4–97.8)
MONOCYTES # BLD AUTO: 0.77 K/UL (ref 0–0.85)
MONOCYTES NFR BLD AUTO: 11.5 % (ref 0–13.4)
NEUTROPHILS # BLD AUTO: 3.92 K/UL (ref 1.82–7.42)
NEUTROPHILS NFR BLD: 58.4 % (ref 44–72)
NRBC # BLD AUTO: 0 K/UL
NRBC BLD-RTO: 0 /100 WBC (ref 0–0.2)
OUTPT INFUS CBC COMMENT OICOM: ABNORMAL
PLATELET # BLD AUTO: 210 K/UL (ref 164–446)
PMV BLD AUTO: 8.8 FL (ref 9–12.9)
POTASSIUM SERPL-SCNC: 3.7 MMOL/L (ref 3.6–5.5)
PROT SERPL-MCNC: 7.1 G/DL (ref 6–8.2)
RBC # BLD AUTO: 3.7 M/UL (ref 4.7–6.1)
SODIUM SERPL-SCNC: 139 MMOL/L (ref 135–145)
WBC # BLD AUTO: 6.7 K/UL (ref 4.8–10.8)

## 2024-07-22 PROCEDURE — 80053 COMPREHEN METABOLIC PANEL: CPT

## 2024-07-22 PROCEDURE — 700111 HCHG RX REV CODE 636 W/ 250 OVERRIDE (IP): Performed by: NURSE PRACTITIONER

## 2024-07-22 PROCEDURE — 96365 THER/PROPH/DIAG IV INF INIT: CPT

## 2024-07-22 PROCEDURE — 85025 COMPLETE CBC W/AUTO DIFF WBC: CPT

## 2024-07-22 PROCEDURE — 700105 HCHG RX REV CODE 258: Performed by: NURSE PRACTITIONER

## 2024-07-22 RX ORDER — 0.9 % SODIUM CHLORIDE 0.9 %
VIAL (ML) INJECTION PRN
Status: CANCELLED | OUTPATIENT
Start: 2024-07-23

## 2024-07-22 RX ORDER — 0.9 % SODIUM CHLORIDE 0.9 %
3 VIAL (ML) INJECTION PRN
Status: CANCELLED | OUTPATIENT
Start: 2024-07-23

## 2024-07-22 RX ORDER — 0.9 % SODIUM CHLORIDE 0.9 %
10 VIAL (ML) INJECTION PRN
Status: CANCELLED | OUTPATIENT
Start: 2024-07-23

## 2024-07-22 RX ADMIN — ERTAPENEM 1000 MG: 1 INJECTION, POWDER, LYOPHILIZED, FOR SOLUTION INTRAMUSCULAR; INTRAVENOUS at 16:03

## 2024-07-22 ASSESSMENT — ENCOUNTER SYMPTOMS
NERVOUS/ANXIOUS: 0
NAUSEA: 0
SHORTNESS OF BREATH: 0
ABDOMINAL PAIN: 0
COUGH: 0
VOMITING: 0
FOCAL WEAKNESS: 1
MYALGIAS: 0
WHEEZING: 0
BRUISES/BLEEDS EASILY: 0
BLURRED VISION: 0
CHILLS: 0
HEADACHES: 0
FEVER: 0
DOUBLE VISION: 0
WEIGHT LOSS: 0
DIZZINESS: 0
SPUTUM PRODUCTION: 0
PALPITATIONS: 0

## 2024-07-22 ASSESSMENT — FIBROSIS 4 INDEX: FIB4 SCORE: 1.82

## 2024-07-23 ENCOUNTER — OUTPATIENT INFUSION SERVICES (OUTPATIENT)
Dept: ONCOLOGY | Facility: MEDICAL CENTER | Age: 74
End: 2024-07-23
Attending: NURSE PRACTITIONER
Payer: MEDICARE

## 2024-07-23 VITALS
OXYGEN SATURATION: 93 % | SYSTOLIC BLOOD PRESSURE: 152 MMHG | HEART RATE: 76 BPM | TEMPERATURE: 97.4 F | DIASTOLIC BLOOD PRESSURE: 81 MMHG | RESPIRATION RATE: 18 BRPM

## 2024-07-23 DIAGNOSIS — M86.09 ACUTE HEMATOGENOUS OSTEOMYELITIS OF MULTIPLE SITES (HCC): ICD-10-CM

## 2024-07-23 PROCEDURE — 700111 HCHG RX REV CODE 636 W/ 250 OVERRIDE (IP): Performed by: NURSE PRACTITIONER

## 2024-07-23 PROCEDURE — 96365 THER/PROPH/DIAG IV INF INIT: CPT

## 2024-07-23 PROCEDURE — 700105 HCHG RX REV CODE 258: Performed by: NURSE PRACTITIONER

## 2024-07-23 RX ORDER — 0.9 % SODIUM CHLORIDE 0.9 %
10 VIAL (ML) INJECTION PRN
Status: CANCELLED | OUTPATIENT
Start: 2024-07-24

## 2024-07-23 RX ORDER — 0.9 % SODIUM CHLORIDE 0.9 %
3 VIAL (ML) INJECTION PRN
Status: CANCELLED | OUTPATIENT
Start: 2024-07-24

## 2024-07-23 RX ORDER — 0.9 % SODIUM CHLORIDE 0.9 %
VIAL (ML) INJECTION PRN
Status: CANCELLED | OUTPATIENT
Start: 2024-07-24

## 2024-07-23 RX ADMIN — ERTAPENEM 1000 MG: 1 INJECTION, POWDER, LYOPHILIZED, FOR SOLUTION INTRAMUSCULAR; INTRAVENOUS at 17:27

## 2024-07-24 ENCOUNTER — OUTPATIENT INFUSION SERVICES (OUTPATIENT)
Dept: ONCOLOGY | Facility: MEDICAL CENTER | Age: 74
End: 2024-07-24
Attending: NURSE PRACTITIONER
Payer: MEDICARE

## 2024-07-24 ENCOUNTER — OFFICE VISIT (OUTPATIENT)
Dept: WOUND CARE | Facility: MEDICAL CENTER | Age: 74
End: 2024-07-24
Payer: MEDICARE

## 2024-07-24 ENCOUNTER — OFFICE VISIT (OUTPATIENT)
Dept: INFECTIOUS DISEASES | Facility: MEDICAL CENTER | Age: 74
End: 2024-07-24
Attending: NURSE PRACTITIONER
Payer: MEDICARE

## 2024-07-24 VITALS
HEART RATE: 63 BPM | OXYGEN SATURATION: 98 % | BODY MASS INDEX: 30.72 KG/M2 | DIASTOLIC BLOOD PRESSURE: 99 MMHG | WEIGHT: 214.07 LBS | SYSTOLIC BLOOD PRESSURE: 170 MMHG | TEMPERATURE: 97.8 F | RESPIRATION RATE: 18 BRPM

## 2024-07-24 VITALS
RESPIRATION RATE: 17 BRPM | SYSTOLIC BLOOD PRESSURE: 146 MMHG | DIASTOLIC BLOOD PRESSURE: 90 MMHG | OXYGEN SATURATION: 96 % | HEART RATE: 60 BPM | TEMPERATURE: 97.8 F

## 2024-07-24 VITALS
OXYGEN SATURATION: 96 % | HEART RATE: 60 BPM | DIASTOLIC BLOOD PRESSURE: 90 MMHG | TEMPERATURE: 97.8 F | SYSTOLIC BLOOD PRESSURE: 146 MMHG

## 2024-07-24 DIAGNOSIS — M86.9 OSTEOMYELITIS OF LEFT LOWER EXTREMITY (HCC): ICD-10-CM

## 2024-07-24 DIAGNOSIS — L89.314 PRESSURE INJURY OF RIGHT ISCHIUM, STAGE 4 (HCC): ICD-10-CM

## 2024-07-24 DIAGNOSIS — L89.324 PRESSURE INJURY OF LEFT ISCHIUM, STAGE 4 (HCC): ICD-10-CM

## 2024-07-24 DIAGNOSIS — L89.894 PRESSURE INJURY OF LEFT FOOT, STAGE 4 (HCC): ICD-10-CM

## 2024-07-24 DIAGNOSIS — Z86.19 HISTORY OF INFECTION DUE TO EXTENDED SPECTRUM BETA LACTAMASE (ESBL) PRODUCING BACTERIA: ICD-10-CM

## 2024-07-24 DIAGNOSIS — L89.154 SACRAL DECUBITUS ULCER, STAGE IV (HCC): ICD-10-CM

## 2024-07-24 DIAGNOSIS — T81.31XD POSTOPERATIVE WOUND DEHISCENCE, SUBSEQUENT ENCOUNTER: ICD-10-CM

## 2024-07-24 DIAGNOSIS — L89.90 PRESSURE ULCERS OF SKIN OF MULTIPLE TOPOGRAPHIC SITES: ICD-10-CM

## 2024-07-24 DIAGNOSIS — L89.623 PRESSURE INJURY OF LEFT HEEL, STAGE 3 (HCC): ICD-10-CM

## 2024-07-24 DIAGNOSIS — M86.09 ACUTE HEMATOGENOUS OSTEOMYELITIS OF MULTIPLE SITES (HCC): ICD-10-CM

## 2024-07-24 DIAGNOSIS — G82.53 QUADRIPLEGIA, C5-C7 COMPLETE (HCC): ICD-10-CM

## 2024-07-24 DIAGNOSIS — G82.54 QUADRIPLEGIA, C5-C7, INCOMPLETE (HCC): ICD-10-CM

## 2024-07-24 DIAGNOSIS — L89.893 PRESSURE INJURY OF LEFT LEG, STAGE 3 (HCC): ICD-10-CM

## 2024-07-24 DIAGNOSIS — M86.18 OTHER ACUTE OSTEOMYELITIS, OTHER SITE (HCC): ICD-10-CM

## 2024-07-24 PROBLEM — R25.2 SPASM: Status: ACTIVE | Noted: 2024-07-24

## 2024-07-24 PROCEDURE — 11042 DBRDMT SUBQ TIS 1ST 20SQCM/<: CPT

## 2024-07-24 PROCEDURE — 3077F SYST BP >= 140 MM HG: CPT | Performed by: NURSE PRACTITIONER

## 2024-07-24 PROCEDURE — 99214 OFFICE O/P EST MOD 30 MIN: CPT | Performed by: NURSE PRACTITIONER

## 2024-07-24 PROCEDURE — 11046 DBRDMT MUSC&/FSCA EA ADDL: CPT

## 2024-07-24 PROCEDURE — 3080F DIAST BP >= 90 MM HG: CPT | Performed by: NURSE PRACTITIONER

## 2024-07-24 PROCEDURE — 700111 HCHG RX REV CODE 636 W/ 250 OVERRIDE (IP): Performed by: NURSE PRACTITIONER

## 2024-07-24 PROCEDURE — 11046 DBRDMT MUSC&/FSCA EA ADDL: CPT | Performed by: NURSE PRACTITIONER

## 2024-07-24 PROCEDURE — 96365 THER/PROPH/DIAG IV INF INIT: CPT

## 2024-07-24 PROCEDURE — 700105 HCHG RX REV CODE 258: Performed by: NURSE PRACTITIONER

## 2024-07-24 PROCEDURE — 99212 OFFICE O/P EST SF 10 MIN: CPT | Performed by: NURSE PRACTITIONER

## 2024-07-24 PROCEDURE — 11043 DBRDMT MUSC&/FSCA 1ST 20/<: CPT

## 2024-07-24 PROCEDURE — 11043 DBRDMT MUSC&/FSCA 1ST 20/<: CPT | Performed by: NURSE PRACTITIONER

## 2024-07-24 RX ORDER — 0.9 % SODIUM CHLORIDE 0.9 %
VIAL (ML) INJECTION PRN
Status: CANCELLED | OUTPATIENT
Start: 2024-07-25

## 2024-07-24 RX ORDER — 0.9 % SODIUM CHLORIDE 0.9 %
10 VIAL (ML) INJECTION PRN
Status: CANCELLED | OUTPATIENT
Start: 2024-07-25

## 2024-07-24 RX ORDER — 0.9 % SODIUM CHLORIDE 0.9 %
3 VIAL (ML) INJECTION PRN
Status: CANCELLED | OUTPATIENT
Start: 2024-07-25

## 2024-07-24 RX ADMIN — ERTAPENEM 1000 MG: 1 INJECTION, POWDER, LYOPHILIZED, FOR SOLUTION INTRAMUSCULAR; INTRAVENOUS at 17:27

## 2024-07-24 ASSESSMENT — FIBROSIS 4 INDEX: FIB4 SCORE: 1.47

## 2024-07-25 ENCOUNTER — OUTPATIENT INFUSION SERVICES (OUTPATIENT)
Dept: ONCOLOGY | Facility: MEDICAL CENTER | Age: 74
End: 2024-07-25
Attending: NURSE PRACTITIONER
Payer: MEDICARE

## 2024-07-25 VITALS
OXYGEN SATURATION: 98 % | SYSTOLIC BLOOD PRESSURE: 185 MMHG | TEMPERATURE: 98 F | DIASTOLIC BLOOD PRESSURE: 108 MMHG | RESPIRATION RATE: 18 BRPM | HEART RATE: 99 BPM

## 2024-07-25 DIAGNOSIS — M86.09 ACUTE HEMATOGENOUS OSTEOMYELITIS OF MULTIPLE SITES (HCC): ICD-10-CM

## 2024-07-25 PROCEDURE — 700105 HCHG RX REV CODE 258: Performed by: NURSE PRACTITIONER

## 2024-07-25 PROCEDURE — 700111 HCHG RX REV CODE 636 W/ 250 OVERRIDE (IP): Performed by: NURSE PRACTITIONER

## 2024-07-25 PROCEDURE — 96365 THER/PROPH/DIAG IV INF INIT: CPT

## 2024-07-25 RX ORDER — 0.9 % SODIUM CHLORIDE 0.9 %
3 VIAL (ML) INJECTION PRN
Status: CANCELLED | OUTPATIENT
Start: 2024-07-26

## 2024-07-25 RX ORDER — 0.9 % SODIUM CHLORIDE 0.9 %
10 VIAL (ML) INJECTION PRN
Status: CANCELLED | OUTPATIENT
Start: 2024-07-26

## 2024-07-25 RX ORDER — 0.9 % SODIUM CHLORIDE 0.9 %
VIAL (ML) INJECTION PRN
Status: CANCELLED | OUTPATIENT
Start: 2024-07-26

## 2024-07-25 RX ADMIN — ERTAPENEM 1000 MG: 1 INJECTION, POWDER, LYOPHILIZED, FOR SOLUTION INTRAMUSCULAR; INTRAVENOUS at 17:09

## 2024-07-26 ENCOUNTER — OUTPATIENT INFUSION SERVICES (OUTPATIENT)
Dept: ONCOLOGY | Facility: MEDICAL CENTER | Age: 74
End: 2024-07-26
Attending: NURSE PRACTITIONER
Payer: MEDICARE

## 2024-07-26 VITALS
SYSTOLIC BLOOD PRESSURE: 191 MMHG | DIASTOLIC BLOOD PRESSURE: 102 MMHG | RESPIRATION RATE: 18 BRPM | TEMPERATURE: 99.1 F | OXYGEN SATURATION: 94 % | HEART RATE: 60 BPM

## 2024-07-26 DIAGNOSIS — M86.09 ACUTE HEMATOGENOUS OSTEOMYELITIS OF MULTIPLE SITES (HCC): ICD-10-CM

## 2024-07-26 PROCEDURE — 96365 THER/PROPH/DIAG IV INF INIT: CPT

## 2024-07-26 PROCEDURE — 700111 HCHG RX REV CODE 636 W/ 250 OVERRIDE (IP): Performed by: NURSE PRACTITIONER

## 2024-07-26 PROCEDURE — 700105 HCHG RX REV CODE 258: Performed by: NURSE PRACTITIONER

## 2024-07-26 RX ORDER — 0.9 % SODIUM CHLORIDE 0.9 %
10 VIAL (ML) INJECTION PRN
Status: CANCELLED | OUTPATIENT
Start: 2024-07-27

## 2024-07-26 RX ORDER — 0.9 % SODIUM CHLORIDE 0.9 %
3 VIAL (ML) INJECTION PRN
Status: CANCELLED | OUTPATIENT
Start: 2024-07-27

## 2024-07-26 RX ORDER — 0.9 % SODIUM CHLORIDE 0.9 %
VIAL (ML) INJECTION PRN
Status: CANCELLED | OUTPATIENT
Start: 2024-07-27

## 2024-07-26 RX ADMIN — ERTAPENEM 1000 MG: 1 INJECTION, POWDER, LYOPHILIZED, FOR SOLUTION INTRAMUSCULAR; INTRAVENOUS at 16:30

## 2024-07-27 ENCOUNTER — OUTPATIENT INFUSION SERVICES (OUTPATIENT)
Dept: ONCOLOGY | Facility: MEDICAL CENTER | Age: 74
End: 2024-07-27
Attending: NURSE PRACTITIONER
Payer: MEDICARE

## 2024-07-27 VITALS
RESPIRATION RATE: 18 BRPM | OXYGEN SATURATION: 95 % | SYSTOLIC BLOOD PRESSURE: 153 MMHG | TEMPERATURE: 98 F | DIASTOLIC BLOOD PRESSURE: 90 MMHG | HEART RATE: 77 BPM

## 2024-07-27 DIAGNOSIS — M86.09 ACUTE HEMATOGENOUS OSTEOMYELITIS OF MULTIPLE SITES (HCC): ICD-10-CM

## 2024-07-27 PROCEDURE — 700105 HCHG RX REV CODE 258: Performed by: NURSE PRACTITIONER

## 2024-07-27 PROCEDURE — 700111 HCHG RX REV CODE 636 W/ 250 OVERRIDE (IP): Performed by: NURSE PRACTITIONER

## 2024-07-27 PROCEDURE — 96365 THER/PROPH/DIAG IV INF INIT: CPT

## 2024-07-27 RX ORDER — 0.9 % SODIUM CHLORIDE 0.9 %
3 VIAL (ML) INJECTION PRN
Status: CANCELLED | OUTPATIENT
Start: 2024-07-28

## 2024-07-27 RX ORDER — 0.9 % SODIUM CHLORIDE 0.9 %
10 VIAL (ML) INJECTION PRN
Status: CANCELLED | OUTPATIENT
Start: 2024-07-28

## 2024-07-27 RX ORDER — 0.9 % SODIUM CHLORIDE 0.9 %
VIAL (ML) INJECTION PRN
Status: CANCELLED | OUTPATIENT
Start: 2024-07-28

## 2024-07-27 RX ADMIN — ERTAPENEM 1000 MG: 1 INJECTION, POWDER, LYOPHILIZED, FOR SOLUTION INTRAMUSCULAR; INTRAVENOUS at 16:40

## 2024-07-28 ENCOUNTER — OUTPATIENT INFUSION SERVICES (OUTPATIENT)
Dept: ONCOLOGY | Facility: MEDICAL CENTER | Age: 74
End: 2024-07-28
Attending: NURSE PRACTITIONER
Payer: MEDICARE

## 2024-07-28 VITALS
RESPIRATION RATE: 18 BRPM | OXYGEN SATURATION: 95 % | DIASTOLIC BLOOD PRESSURE: 80 MMHG | TEMPERATURE: 98.9 F | SYSTOLIC BLOOD PRESSURE: 141 MMHG | HEART RATE: 67 BPM

## 2024-07-28 DIAGNOSIS — M86.09 ACUTE HEMATOGENOUS OSTEOMYELITIS OF MULTIPLE SITES (HCC): ICD-10-CM

## 2024-07-28 PROCEDURE — 700111 HCHG RX REV CODE 636 W/ 250 OVERRIDE (IP): Performed by: NURSE PRACTITIONER

## 2024-07-28 PROCEDURE — 700105 HCHG RX REV CODE 258: Performed by: NURSE PRACTITIONER

## 2024-07-28 PROCEDURE — 96365 THER/PROPH/DIAG IV INF INIT: CPT

## 2024-07-28 RX ORDER — 0.9 % SODIUM CHLORIDE 0.9 %
10 VIAL (ML) INJECTION PRN
Status: CANCELLED | OUTPATIENT
Start: 2024-07-29

## 2024-07-28 RX ORDER — 0.9 % SODIUM CHLORIDE 0.9 %
3 VIAL (ML) INJECTION PRN
Status: CANCELLED | OUTPATIENT
Start: 2024-07-29

## 2024-07-28 RX ORDER — 0.9 % SODIUM CHLORIDE 0.9 %
VIAL (ML) INJECTION PRN
Status: CANCELLED | OUTPATIENT
Start: 2024-07-29

## 2024-07-28 RX ADMIN — ERTAPENEM 1000 MG: 1 INJECTION, POWDER, LYOPHILIZED, FOR SOLUTION INTRAMUSCULAR; INTRAVENOUS at 15:10

## 2024-07-29 ENCOUNTER — OUTPATIENT INFUSION SERVICES (OUTPATIENT)
Dept: ONCOLOGY | Facility: MEDICAL CENTER | Age: 74
End: 2024-07-29
Attending: NURSE PRACTITIONER
Payer: MEDICARE

## 2024-07-29 VITALS
WEIGHT: 213.85 LBS | HEIGHT: 70 IN | HEART RATE: 77 BPM | SYSTOLIC BLOOD PRESSURE: 162 MMHG | RESPIRATION RATE: 18 BRPM | OXYGEN SATURATION: 94 % | BODY MASS INDEX: 30.61 KG/M2 | TEMPERATURE: 97.2 F | DIASTOLIC BLOOD PRESSURE: 96 MMHG

## 2024-07-29 DIAGNOSIS — M86.09 ACUTE HEMATOGENOUS OSTEOMYELITIS OF MULTIPLE SITES (HCC): ICD-10-CM

## 2024-07-29 LAB
ALBUMIN SERPL BCP-MCNC: 3.4 G/DL (ref 3.2–4.9)
ALBUMIN/GLOB SERPL: 0.9 G/DL
ALP SERPL-CCNC: 99 U/L (ref 30–99)
ALT SERPL-CCNC: 5 U/L (ref 2–50)
ANION GAP SERPL CALC-SCNC: 12 MMOL/L (ref 7–16)
AST SERPL-CCNC: 11 U/L (ref 12–45)
BASOPHILS # BLD AUTO: 0.6 % (ref 0–1.8)
BASOPHILS # BLD: 0.04 K/UL (ref 0–0.12)
BILIRUB SERPL-MCNC: 0.4 MG/DL (ref 0.1–1.5)
BUN SERPL-MCNC: 18 MG/DL (ref 8–22)
CALCIUM ALBUM COR SERPL-MCNC: 9.7 MG/DL (ref 8.5–10.5)
CALCIUM SERPL-MCNC: 9.2 MG/DL (ref 8.5–10.5)
CHLORIDE SERPL-SCNC: 104 MMOL/L (ref 96–112)
CO2 SERPL-SCNC: 23 MMOL/L (ref 20–33)
CREAT SERPL-MCNC: 0.4 MG/DL (ref 0.5–1.4)
CRP SERPL HS-MCNC: 4.19 MG/DL (ref 0–0.75)
EOSINOPHIL # BLD AUTO: 0.29 K/UL (ref 0–0.51)
EOSINOPHIL NFR BLD: 4.5 % (ref 0–6.9)
ERYTHROCYTE [DISTWIDTH] IN BLOOD BY AUTOMATED COUNT: 60.7 FL (ref 35.9–50)
ERYTHROCYTE [SEDIMENTATION RATE] IN BLOOD BY WESTERGREN METHOD: 85 MM/HOUR (ref 0–20)
GFR SERPLBLD CREATININE-BSD FMLA CKD-EPI: 114 ML/MIN/1.73 M 2
GLOBULIN SER CALC-MCNC: 4 G/DL (ref 1.9–3.5)
GLUCOSE SERPL-MCNC: 92 MG/DL (ref 65–99)
HCT VFR BLD AUTO: 36.8 % (ref 42–52)
HGB BLD-MCNC: 11.8 G/DL (ref 14–18)
IMM GRANULOCYTES # BLD AUTO: 0.02 K/UL (ref 0–0.11)
IMM GRANULOCYTES NFR BLD AUTO: 0.3 % (ref 0–0.9)
LYMPHOCYTES # BLD AUTO: 1.62 K/UL (ref 1–4.8)
LYMPHOCYTES NFR BLD: 24.9 % (ref 22–41)
MCH RBC QN AUTO: 31.5 PG (ref 27–33)
MCHC RBC AUTO-ENTMCNC: 32.1 G/DL (ref 32.3–36.5)
MCV RBC AUTO: 98.1 FL (ref 81.4–97.8)
MONOCYTES # BLD AUTO: 0.69 K/UL (ref 0–0.85)
MONOCYTES NFR BLD AUTO: 10.6 % (ref 0–13.4)
NEUTROPHILS # BLD AUTO: 3.85 K/UL (ref 1.82–7.42)
NEUTROPHILS NFR BLD: 59.1 % (ref 44–72)
NRBC # BLD AUTO: 0 K/UL
NRBC BLD-RTO: 0 /100 WBC (ref 0–0.2)
OUTPT INFUS CBC COMMENT OICOM: ABNORMAL
PLATELET # BLD AUTO: 193 K/UL (ref 164–446)
PMV BLD AUTO: 8.9 FL (ref 9–12.9)
POTASSIUM SERPL-SCNC: 4 MMOL/L (ref 3.6–5.5)
PROT SERPL-MCNC: 7.4 G/DL (ref 6–8.2)
RBC # BLD AUTO: 3.75 M/UL (ref 4.7–6.1)
SODIUM SERPL-SCNC: 139 MMOL/L (ref 135–145)
WBC # BLD AUTO: 6.5 K/UL (ref 4.8–10.8)

## 2024-07-29 PROCEDURE — 86140 C-REACTIVE PROTEIN: CPT

## 2024-07-29 PROCEDURE — 80053 COMPREHEN METABOLIC PANEL: CPT

## 2024-07-29 PROCEDURE — 700105 HCHG RX REV CODE 258: Performed by: NURSE PRACTITIONER

## 2024-07-29 PROCEDURE — 85652 RBC SED RATE AUTOMATED: CPT

## 2024-07-29 PROCEDURE — 85025 COMPLETE CBC W/AUTO DIFF WBC: CPT

## 2024-07-29 PROCEDURE — 700111 HCHG RX REV CODE 636 W/ 250 OVERRIDE (IP): Performed by: NURSE PRACTITIONER

## 2024-07-29 PROCEDURE — 96365 THER/PROPH/DIAG IV INF INIT: CPT

## 2024-07-29 RX ORDER — 0.9 % SODIUM CHLORIDE 0.9 %
10 VIAL (ML) INJECTION PRN
Status: CANCELLED | OUTPATIENT
Start: 2024-07-30

## 2024-07-29 RX ORDER — 0.9 % SODIUM CHLORIDE 0.9 %
3 VIAL (ML) INJECTION PRN
Status: CANCELLED | OUTPATIENT
Start: 2024-07-30

## 2024-07-29 RX ORDER — 0.9 % SODIUM CHLORIDE 0.9 %
VIAL (ML) INJECTION PRN
Status: CANCELLED | OUTPATIENT
Start: 2024-07-30

## 2024-07-29 RX ADMIN — ERTAPENEM 1000 MG: 1 INJECTION, POWDER, LYOPHILIZED, FOR SOLUTION INTRAMUSCULAR; INTRAVENOUS at 15:24

## 2024-07-29 ASSESSMENT — FIBROSIS 4 INDEX: FIB4 SCORE: 1.47

## 2024-07-30 ENCOUNTER — OUTPATIENT INFUSION SERVICES (OUTPATIENT)
Dept: ONCOLOGY | Facility: MEDICAL CENTER | Age: 74
End: 2024-07-30
Attending: NURSE PRACTITIONER
Payer: MEDICARE

## 2024-07-30 VITALS
RESPIRATION RATE: 18 BRPM | OXYGEN SATURATION: 96 % | TEMPERATURE: 99.5 F | HEART RATE: 67 BPM | DIASTOLIC BLOOD PRESSURE: 84 MMHG | SYSTOLIC BLOOD PRESSURE: 149 MMHG

## 2024-07-30 DIAGNOSIS — M86.09 ACUTE HEMATOGENOUS OSTEOMYELITIS OF MULTIPLE SITES (HCC): ICD-10-CM

## 2024-07-30 PROCEDURE — 700111 HCHG RX REV CODE 636 W/ 250 OVERRIDE (IP): Performed by: NURSE PRACTITIONER

## 2024-07-30 PROCEDURE — 96365 THER/PROPH/DIAG IV INF INIT: CPT

## 2024-07-30 PROCEDURE — 700105 HCHG RX REV CODE 258: Performed by: NURSE PRACTITIONER

## 2024-07-30 RX ORDER — 0.9 % SODIUM CHLORIDE 0.9 %
VIAL (ML) INJECTION PRN
Status: CANCELLED | OUTPATIENT
Start: 2024-07-31

## 2024-07-30 RX ORDER — 0.9 % SODIUM CHLORIDE 0.9 %
10 VIAL (ML) INJECTION PRN
Status: CANCELLED | OUTPATIENT
Start: 2024-07-31

## 2024-07-30 RX ORDER — 0.9 % SODIUM CHLORIDE 0.9 %
3 VIAL (ML) INJECTION PRN
Status: CANCELLED | OUTPATIENT
Start: 2024-07-31

## 2024-07-30 RX ADMIN — ERTAPENEM 1000 MG: 1 INJECTION, POWDER, LYOPHILIZED, FOR SOLUTION INTRAMUSCULAR; INTRAVENOUS at 17:09

## 2024-07-31 ENCOUNTER — OUTPATIENT INFUSION SERVICES (OUTPATIENT)
Dept: ONCOLOGY | Facility: MEDICAL CENTER | Age: 74
End: 2024-07-31
Attending: NURSE PRACTITIONER
Payer: MEDICARE

## 2024-07-31 ENCOUNTER — OFFICE VISIT (OUTPATIENT)
Dept: WOUND CARE | Facility: MEDICAL CENTER | Age: 74
End: 2024-07-31
Payer: MEDICARE

## 2024-07-31 VITALS
SYSTOLIC BLOOD PRESSURE: 143 MMHG | OXYGEN SATURATION: 92 % | TEMPERATURE: 98.2 F | RESPIRATION RATE: 18 BRPM | DIASTOLIC BLOOD PRESSURE: 82 MMHG | HEART RATE: 82 BPM

## 2024-07-31 VITALS
OXYGEN SATURATION: 97 % | RESPIRATION RATE: 18 BRPM | HEART RATE: 71 BPM | TEMPERATURE: 97 F | DIASTOLIC BLOOD PRESSURE: 97 MMHG | SYSTOLIC BLOOD PRESSURE: 136 MMHG

## 2024-07-31 DIAGNOSIS — M86.9 OSTEOMYELITIS OF LEFT LOWER EXTREMITY (HCC): ICD-10-CM

## 2024-07-31 DIAGNOSIS — L89.324 PRESSURE INJURY OF LEFT ISCHIUM, STAGE 4 (HCC): Primary | ICD-10-CM

## 2024-07-31 DIAGNOSIS — L89.894 PRESSURE INJURY OF LEFT FOOT, STAGE 4 (HCC): ICD-10-CM

## 2024-07-31 DIAGNOSIS — L89.623 PRESSURE INJURY OF LEFT HEEL, STAGE 3 (HCC): ICD-10-CM

## 2024-07-31 DIAGNOSIS — L89.154 SACRAL DECUBITUS ULCER, STAGE IV (HCC): ICD-10-CM

## 2024-07-31 DIAGNOSIS — G82.54 QUADRIPLEGIA, C5-C7, INCOMPLETE (HCC): ICD-10-CM

## 2024-07-31 DIAGNOSIS — L89.893 PRESSURE INJURY OF LEFT LEG, STAGE 3 (HCC): ICD-10-CM

## 2024-07-31 DIAGNOSIS — L89.314 PRESSURE INJURY OF RIGHT ISCHIUM, STAGE 4 (HCC): ICD-10-CM

## 2024-07-31 DIAGNOSIS — T81.31XD POSTOPERATIVE WOUND DEHISCENCE, SUBSEQUENT ENCOUNTER: ICD-10-CM

## 2024-07-31 DIAGNOSIS — M86.09 ACUTE HEMATOGENOUS OSTEOMYELITIS OF MULTIPLE SITES (HCC): ICD-10-CM

## 2024-07-31 PROCEDURE — 96365 THER/PROPH/DIAG IV INF INIT: CPT

## 2024-07-31 PROCEDURE — 11046 DBRDMT MUSC&/FSCA EA ADDL: CPT

## 2024-07-31 PROCEDURE — 700105 HCHG RX REV CODE 258: Performed by: NURSE PRACTITIONER

## 2024-07-31 PROCEDURE — 700111 HCHG RX REV CODE 636 W/ 250 OVERRIDE (IP): Performed by: NURSE PRACTITIONER

## 2024-07-31 PROCEDURE — 11043 DBRDMT MUSC&/FSCA 1ST 20/<: CPT

## 2024-07-31 RX ORDER — 0.9 % SODIUM CHLORIDE 0.9 %
3 VIAL (ML) INJECTION PRN
OUTPATIENT
Start: 2024-08-01

## 2024-07-31 RX ORDER — 0.9 % SODIUM CHLORIDE 0.9 %
10 VIAL (ML) INJECTION PRN
OUTPATIENT
Start: 2024-08-01

## 2024-07-31 RX ORDER — 0.9 % SODIUM CHLORIDE 0.9 %
VIAL (ML) INJECTION PRN
OUTPATIENT
Start: 2024-08-01

## 2024-07-31 RX ADMIN — ERTAPENEM 1000 MG: 1 INJECTION, POWDER, LYOPHILIZED, FOR SOLUTION INTRAMUSCULAR; INTRAVENOUS at 16:34

## 2024-08-01 ENCOUNTER — APPOINTMENT (OUTPATIENT)
Dept: ONCOLOGY | Facility: MEDICAL CENTER | Age: 74
End: 2024-08-01
Attending: NURSE PRACTITIONER
Payer: MEDICARE

## 2024-08-01 VITALS
SYSTOLIC BLOOD PRESSURE: 115 MMHG | OXYGEN SATURATION: 98 % | RESPIRATION RATE: 18 BRPM | DIASTOLIC BLOOD PRESSURE: 83 MMHG | TEMPERATURE: 99 F | HEART RATE: 84 BPM

## 2024-08-01 DIAGNOSIS — M86.09 ACUTE HEMATOGENOUS OSTEOMYELITIS OF MULTIPLE SITES (HCC): ICD-10-CM

## 2024-08-01 PROCEDURE — 96365 THER/PROPH/DIAG IV INF INIT: CPT

## 2024-08-01 PROCEDURE — 700105 HCHG RX REV CODE 258: Performed by: NURSE PRACTITIONER

## 2024-08-01 PROCEDURE — 700111 HCHG RX REV CODE 636 W/ 250 OVERRIDE (IP): Performed by: NURSE PRACTITIONER

## 2024-08-01 RX ORDER — 0.9 % SODIUM CHLORIDE 0.9 %
10 VIAL (ML) INJECTION PRN
Status: CANCELLED | OUTPATIENT
Start: 2024-08-02

## 2024-08-01 RX ORDER — 0.9 % SODIUM CHLORIDE 0.9 %
3 VIAL (ML) INJECTION PRN
Status: CANCELLED | OUTPATIENT
Start: 2024-08-02

## 2024-08-01 RX ORDER — 0.9 % SODIUM CHLORIDE 0.9 %
VIAL (ML) INJECTION PRN
Status: CANCELLED | OUTPATIENT
Start: 2024-08-02

## 2024-08-01 RX ADMIN — ERTAPENEM 1000 MG: 1 INJECTION, POWDER, LYOPHILIZED, FOR SOLUTION INTRAMUSCULAR; INTRAVENOUS at 16:47

## 2024-08-02 ENCOUNTER — APPOINTMENT (OUTPATIENT)
Dept: ONCOLOGY | Facility: MEDICAL CENTER | Age: 74
End: 2024-08-02
Attending: NURSE PRACTITIONER
Payer: MEDICARE

## 2024-08-02 VITALS
RESPIRATION RATE: 18 BRPM | HEART RATE: 78 BPM | TEMPERATURE: 98.8 F | SYSTOLIC BLOOD PRESSURE: 133 MMHG | OXYGEN SATURATION: 96 % | DIASTOLIC BLOOD PRESSURE: 82 MMHG

## 2024-08-02 DIAGNOSIS — M86.09 ACUTE HEMATOGENOUS OSTEOMYELITIS OF MULTIPLE SITES (HCC): ICD-10-CM

## 2024-08-02 PROCEDURE — 700105 HCHG RX REV CODE 258: Performed by: NURSE PRACTITIONER

## 2024-08-02 PROCEDURE — 96365 THER/PROPH/DIAG IV INF INIT: CPT

## 2024-08-02 PROCEDURE — 700111 HCHG RX REV CODE 636 W/ 250 OVERRIDE (IP): Performed by: NURSE PRACTITIONER

## 2024-08-02 RX ORDER — 0.9 % SODIUM CHLORIDE 0.9 %
3 VIAL (ML) INJECTION PRN
Status: CANCELLED | OUTPATIENT
Start: 2024-08-03

## 2024-08-02 RX ORDER — 0.9 % SODIUM CHLORIDE 0.9 %
10 VIAL (ML) INJECTION PRN
Status: CANCELLED | OUTPATIENT
Start: 2024-08-03

## 2024-08-02 RX ORDER — 0.9 % SODIUM CHLORIDE 0.9 %
VIAL (ML) INJECTION PRN
Status: CANCELLED | OUTPATIENT
Start: 2024-08-03

## 2024-08-02 RX ADMIN — ERTAPENEM 1000 MG: 1 INJECTION, POWDER, LYOPHILIZED, FOR SOLUTION INTRAMUSCULAR; INTRAVENOUS at 16:06

## 2024-08-02 NOTE — PROGRESS NOTES
Patient arrived to [facility] for Invanz infusion. Patient does not report any changes or distress from last visit. PICC intact, flushed with NS, blood return noted. Invanz was given, refer to MAR, tolerated. Next appointment scheduled. [facility] and no signs of distress.

## 2024-08-03 ENCOUNTER — APPOINTMENT (OUTPATIENT)
Dept: ONCOLOGY | Facility: MEDICAL CENTER | Age: 74
End: 2024-08-03
Attending: NURSE PRACTITIONER
Payer: MEDICARE

## 2024-08-03 VITALS
OXYGEN SATURATION: 96 % | RESPIRATION RATE: 18 BRPM | SYSTOLIC BLOOD PRESSURE: 140 MMHG | DIASTOLIC BLOOD PRESSURE: 86 MMHG | TEMPERATURE: 97.7 F | HEART RATE: 72 BPM

## 2024-08-03 DIAGNOSIS — M86.09 ACUTE HEMATOGENOUS OSTEOMYELITIS OF MULTIPLE SITES (HCC): ICD-10-CM

## 2024-08-03 PROCEDURE — 96365 THER/PROPH/DIAG IV INF INIT: CPT

## 2024-08-03 PROCEDURE — 700105 HCHG RX REV CODE 258: Performed by: NURSE PRACTITIONER

## 2024-08-03 PROCEDURE — 700111 HCHG RX REV CODE 636 W/ 250 OVERRIDE (IP): Performed by: NURSE PRACTITIONER

## 2024-08-03 RX ORDER — 0.9 % SODIUM CHLORIDE 0.9 %
VIAL (ML) INJECTION PRN
Status: CANCELLED | OUTPATIENT
Start: 2024-08-04

## 2024-08-03 RX ORDER — 0.9 % SODIUM CHLORIDE 0.9 %
3 VIAL (ML) INJECTION PRN
Status: CANCELLED | OUTPATIENT
Start: 2024-08-04

## 2024-08-03 RX ORDER — 0.9 % SODIUM CHLORIDE 0.9 %
10 VIAL (ML) INJECTION PRN
Status: CANCELLED | OUTPATIENT
Start: 2024-08-04

## 2024-08-03 RX ADMIN — ERTAPENEM 1000 MG: 1 INJECTION, POWDER, LYOPHILIZED, FOR SOLUTION INTRAMUSCULAR; INTRAVENOUS at 15:08

## 2024-08-03 NOTE — PROGRESS NOTES
Pt presented to \[facility]\"" for daily IV Invanz. POC discussed and pt verbalized agreement, voices no new concerns. LUE PICC in place, flushes with ease and brisk positive blood return noted, dressing CDI. Invanz administered per MAR and pt tolerated well. No signs of adverse reaction or complications noted. PICC line flushed per policy. Pt confirmed tomorrow's appt and discharged in his motorized w/c to self care. Pt in NAD at time of discharge.

## 2024-08-04 ENCOUNTER — OUTPATIENT INFUSION SERVICES (OUTPATIENT)
Dept: ONCOLOGY | Facility: MEDICAL CENTER | Age: 74
End: 2024-08-04
Attending: NURSE PRACTITIONER
Payer: MEDICARE

## 2024-08-04 VITALS
HEART RATE: 85 BPM | RESPIRATION RATE: 16 BRPM | OXYGEN SATURATION: 91 % | DIASTOLIC BLOOD PRESSURE: 80 MMHG | SYSTOLIC BLOOD PRESSURE: 137 MMHG | TEMPERATURE: 98.2 F

## 2024-08-04 DIAGNOSIS — M86.09 ACUTE HEMATOGENOUS OSTEOMYELITIS OF MULTIPLE SITES (HCC): ICD-10-CM

## 2024-08-04 PROCEDURE — 96365 THER/PROPH/DIAG IV INF INIT: CPT

## 2024-08-04 PROCEDURE — 700105 HCHG RX REV CODE 258: Performed by: NURSE PRACTITIONER

## 2024-08-04 PROCEDURE — 700111 HCHG RX REV CODE 636 W/ 250 OVERRIDE (IP): Performed by: NURSE PRACTITIONER

## 2024-08-04 RX ORDER — 0.9 % SODIUM CHLORIDE 0.9 %
10 VIAL (ML) INJECTION PRN
Status: CANCELLED | OUTPATIENT
Start: 2024-08-05

## 2024-08-04 RX ORDER — 0.9 % SODIUM CHLORIDE 0.9 %
VIAL (ML) INJECTION PRN
Status: CANCELLED | OUTPATIENT
Start: 2024-08-05

## 2024-08-04 RX ORDER — 0.9 % SODIUM CHLORIDE 0.9 %
3 VIAL (ML) INJECTION PRN
Status: CANCELLED | OUTPATIENT
Start: 2024-08-05

## 2024-08-04 RX ADMIN — ERTAPENEM 1000 MG: 1 INJECTION, POWDER, LYOPHILIZED, FOR SOLUTION INTRAMUSCULAR; INTRAVENOUS at 16:18

## 2024-08-05 ENCOUNTER — OUTPATIENT INFUSION SERVICES (OUTPATIENT)
Dept: ONCOLOGY | Facility: MEDICAL CENTER | Age: 74
End: 2024-08-05
Attending: NURSE PRACTITIONER
Payer: MEDICARE

## 2024-08-05 VITALS
OXYGEN SATURATION: 94 % | RESPIRATION RATE: 18 BRPM | SYSTOLIC BLOOD PRESSURE: 165 MMHG | WEIGHT: 213.85 LBS | HEART RATE: 69 BPM | BODY MASS INDEX: 30.61 KG/M2 | DIASTOLIC BLOOD PRESSURE: 92 MMHG | TEMPERATURE: 98 F | HEIGHT: 70 IN

## 2024-08-05 DIAGNOSIS — M86.09 ACUTE HEMATOGENOUS OSTEOMYELITIS OF MULTIPLE SITES (HCC): ICD-10-CM

## 2024-08-05 LAB
ALBUMIN SERPL BCP-MCNC: 3.4 G/DL (ref 3.2–4.9)
ALBUMIN/GLOB SERPL: 0.9 G/DL
ALP SERPL-CCNC: 102 U/L (ref 30–99)
ALT SERPL-CCNC: 5 U/L (ref 2–50)
ANION GAP SERPL CALC-SCNC: 12 MMOL/L (ref 7–16)
AST SERPL-CCNC: 15 U/L (ref 12–45)
BASOPHILS # BLD AUTO: 0.3 % (ref 0–1.8)
BASOPHILS # BLD: 0.02 K/UL (ref 0–0.12)
BILIRUB SERPL-MCNC: 0.4 MG/DL (ref 0.1–1.5)
BUN SERPL-MCNC: 18 MG/DL (ref 8–22)
CALCIUM ALBUM COR SERPL-MCNC: 9.5 MG/DL (ref 8.5–10.5)
CALCIUM SERPL-MCNC: 9 MG/DL (ref 8.5–10.5)
CHLORIDE SERPL-SCNC: 103 MMOL/L (ref 96–112)
CO2 SERPL-SCNC: 22 MMOL/L (ref 20–33)
CREAT SERPL-MCNC: 0.47 MG/DL (ref 0.5–1.4)
EOSINOPHIL # BLD AUTO: 0.25 K/UL (ref 0–0.51)
EOSINOPHIL NFR BLD: 3.5 % (ref 0–6.9)
ERYTHROCYTE [DISTWIDTH] IN BLOOD BY AUTOMATED COUNT: 59.4 FL (ref 35.9–50)
GFR SERPLBLD CREATININE-BSD FMLA CKD-EPI: 109 ML/MIN/1.73 M 2
GLOBULIN SER CALC-MCNC: 3.7 G/DL (ref 1.9–3.5)
GLUCOSE SERPL-MCNC: 94 MG/DL (ref 65–99)
HCT VFR BLD AUTO: 35.8 % (ref 42–52)
HGB BLD-MCNC: 11.7 G/DL (ref 14–18)
IMM GRANULOCYTES # BLD AUTO: 0.03 K/UL (ref 0–0.11)
IMM GRANULOCYTES NFR BLD AUTO: 0.4 % (ref 0–0.9)
LYMPHOCYTES # BLD AUTO: 1.82 K/UL (ref 1–4.8)
LYMPHOCYTES NFR BLD: 25.5 % (ref 22–41)
MCH RBC QN AUTO: 31.8 PG (ref 27–33)
MCHC RBC AUTO-ENTMCNC: 32.7 G/DL (ref 32.3–36.5)
MCV RBC AUTO: 97.3 FL (ref 81.4–97.8)
MONOCYTES # BLD AUTO: 0.8 K/UL (ref 0–0.85)
MONOCYTES NFR BLD AUTO: 11.2 % (ref 0–13.4)
NEUTROPHILS # BLD AUTO: 4.23 K/UL (ref 1.82–7.42)
NEUTROPHILS NFR BLD: 59.1 % (ref 44–72)
NRBC # BLD AUTO: 0 K/UL
NRBC BLD-RTO: 0 /100 WBC (ref 0–0.2)
OUTPT INFUS CBC COMMENT OICOM: ABNORMAL
PLATELET # BLD AUTO: 197 K/UL (ref 164–446)
PMV BLD AUTO: 8.6 FL (ref 9–12.9)
POTASSIUM SERPL-SCNC: 4 MMOL/L (ref 3.6–5.5)
PROT SERPL-MCNC: 7.1 G/DL (ref 6–8.2)
RBC # BLD AUTO: 3.68 M/UL (ref 4.7–6.1)
SODIUM SERPL-SCNC: 137 MMOL/L (ref 135–145)
WBC # BLD AUTO: 7.2 K/UL (ref 4.8–10.8)

## 2024-08-05 PROCEDURE — 80053 COMPREHEN METABOLIC PANEL: CPT

## 2024-08-05 PROCEDURE — 700111 HCHG RX REV CODE 636 W/ 250 OVERRIDE (IP): Performed by: NURSE PRACTITIONER

## 2024-08-05 PROCEDURE — 96365 THER/PROPH/DIAG IV INF INIT: CPT

## 2024-08-05 PROCEDURE — 700105 HCHG RX REV CODE 258: Performed by: NURSE PRACTITIONER

## 2024-08-05 PROCEDURE — 85025 COMPLETE CBC W/AUTO DIFF WBC: CPT

## 2024-08-05 RX ORDER — 0.9 % SODIUM CHLORIDE 0.9 %
10 VIAL (ML) INJECTION PRN
Status: CANCELLED | OUTPATIENT
Start: 2024-08-06

## 2024-08-05 RX ORDER — 0.9 % SODIUM CHLORIDE 0.9 %
3 VIAL (ML) INJECTION PRN
Status: CANCELLED | OUTPATIENT
Start: 2024-08-06

## 2024-08-05 RX ORDER — 0.9 % SODIUM CHLORIDE 0.9 %
VIAL (ML) INJECTION PRN
Status: CANCELLED | OUTPATIENT
Start: 2024-08-06

## 2024-08-05 RX ADMIN — ERTAPENEM 1000 MG: 1 INJECTION, POWDER, LYOPHILIZED, FOR SOLUTION INTRAMUSCULAR; INTRAVENOUS at 16:35

## 2024-08-05 ASSESSMENT — FIBROSIS 4 INDEX: FIB4 SCORE: 1.89

## 2024-08-05 NOTE — PROGRESS NOTES
Anjum arrives to [facility] for daily Invanz for osteomyelitis. Patient denies acute health concerns. Denies side effects from the antibiotics. EDILBERTOE  PICC line flushes easily and has brisk blood return. Invanz infused per MAR without issues. Dressing changed per sterile protocol. Patient returns tomorrow. Discharged home to self care in no apparent distress.   
Grossly Intact

## 2024-08-06 ENCOUNTER — OUTPATIENT INFUSION SERVICES (OUTPATIENT)
Dept: ONCOLOGY | Facility: MEDICAL CENTER | Age: 74
End: 2024-08-06
Attending: NURSE PRACTITIONER
Payer: MEDICARE

## 2024-08-06 VITALS
HEART RATE: 71 BPM | RESPIRATION RATE: 18 BRPM | DIASTOLIC BLOOD PRESSURE: 62 MMHG | OXYGEN SATURATION: 96 % | SYSTOLIC BLOOD PRESSURE: 107 MMHG | TEMPERATURE: 98 F

## 2024-08-06 DIAGNOSIS — M86.09 ACUTE HEMATOGENOUS OSTEOMYELITIS OF MULTIPLE SITES (HCC): ICD-10-CM

## 2024-08-06 PROCEDURE — 700105 HCHG RX REV CODE 258: Performed by: NURSE PRACTITIONER

## 2024-08-06 PROCEDURE — 700111 HCHG RX REV CODE 636 W/ 250 OVERRIDE (IP): Performed by: NURSE PRACTITIONER

## 2024-08-06 RX ORDER — 0.9 % SODIUM CHLORIDE 0.9 %
VIAL (ML) INJECTION PRN
Status: CANCELLED | OUTPATIENT
Start: 2024-08-07

## 2024-08-06 RX ORDER — 0.9 % SODIUM CHLORIDE 0.9 %
10 VIAL (ML) INJECTION PRN
Status: CANCELLED | OUTPATIENT
Start: 2024-08-07

## 2024-08-06 RX ORDER — 0.9 % SODIUM CHLORIDE 0.9 %
3 VIAL (ML) INJECTION PRN
Status: CANCELLED | OUTPATIENT
Start: 2024-08-07

## 2024-08-06 RX ADMIN — ERTAPENEM 1000 MG: 1 INJECTION, POWDER, LYOPHILIZED, FOR SOLUTION INTRAMUSCULAR; INTRAVENOUS at 16:58

## 2024-08-06 NOTE — PROGRESS NOTES
Pt arrived to [facility] for daily Invanz infusion for osteomyelitis. POC discussed with pt and he agrees with plan.     EMELI PICC with brisk blood return. Monday labs drawn as ordered. Pt medicated per MAR. Invanz infused over 30 minutes. Pt tolerated treatment without s/s adverse reaction. PICC flushed with NS.     Pt discharged to self care, [facility]. Pt's next appointment confirmed for 8/6/2024.

## 2024-08-07 ENCOUNTER — OFFICE VISIT (OUTPATIENT)
Dept: WOUND CARE | Facility: MEDICAL CENTER | Age: 74
End: 2024-08-07
Payer: MEDICARE

## 2024-08-07 ENCOUNTER — OUTPATIENT INFUSION SERVICES (OUTPATIENT)
Dept: ONCOLOGY | Facility: MEDICAL CENTER | Age: 74
End: 2024-08-07
Attending: NURSE PRACTITIONER
Payer: MEDICARE

## 2024-08-07 VITALS
SYSTOLIC BLOOD PRESSURE: 103 MMHG | RESPIRATION RATE: 18 BRPM | HEART RATE: 81 BPM | TEMPERATURE: 97.5 F | DIASTOLIC BLOOD PRESSURE: 68 MMHG | OXYGEN SATURATION: 92 %

## 2024-08-07 VITALS — TEMPERATURE: 98.3 F | OXYGEN SATURATION: 100 % | HEART RATE: 60 BPM | RESPIRATION RATE: 18 BRPM

## 2024-08-07 DIAGNOSIS — L89.154 SACRAL DECUBITUS ULCER, STAGE IV (HCC): Primary | ICD-10-CM

## 2024-08-07 DIAGNOSIS — M86.9 OSTEOMYELITIS OF LEFT LOWER EXTREMITY (HCC): ICD-10-CM

## 2024-08-07 DIAGNOSIS — M86.09 ACUTE HEMATOGENOUS OSTEOMYELITIS OF MULTIPLE SITES (HCC): ICD-10-CM

## 2024-08-07 DIAGNOSIS — G82.54 QUADRIPLEGIA, C5-C7, INCOMPLETE (HCC): ICD-10-CM

## 2024-08-07 DIAGNOSIS — L89.324 PRESSURE INJURY OF LEFT ISCHIUM, STAGE 4 (HCC): ICD-10-CM

## 2024-08-07 DIAGNOSIS — L89.893 PRESSURE INJURY OF LEFT LEG, STAGE 3 (HCC): ICD-10-CM

## 2024-08-07 DIAGNOSIS — T81.31XD POSTOPERATIVE WOUND DEHISCENCE, SUBSEQUENT ENCOUNTER: ICD-10-CM

## 2024-08-07 DIAGNOSIS — L89.623 PRESSURE INJURY OF LEFT HEEL, STAGE 3 (HCC): ICD-10-CM

## 2024-08-07 DIAGNOSIS — L89.314 PRESSURE INJURY OF RIGHT ISCHIUM, STAGE 4 (HCC): ICD-10-CM

## 2024-08-07 DIAGNOSIS — L89.894 PRESSURE INJURY OF LEFT FOOT, STAGE 4 (HCC): ICD-10-CM

## 2024-08-07 DIAGNOSIS — M86.18 OTHER ACUTE OSTEOMYELITIS, OTHER SITE (HCC): ICD-10-CM

## 2024-08-07 PROCEDURE — 700105 HCHG RX REV CODE 258: Performed by: NURSE PRACTITIONER

## 2024-08-07 PROCEDURE — 3078F DIAST BP <80 MM HG: CPT | Performed by: NURSE PRACTITIONER

## 2024-08-07 PROCEDURE — 99213 OFFICE O/P EST LOW 20 MIN: CPT

## 2024-08-07 PROCEDURE — 96365 THER/PROPH/DIAG IV INF INIT: CPT

## 2024-08-07 PROCEDURE — 97605 NEG PRS WND THER DME<=50SQCM: CPT

## 2024-08-07 PROCEDURE — 11046 DBRDMT MUSC&/FSCA EA ADDL: CPT | Performed by: NURSE PRACTITIONER

## 2024-08-07 PROCEDURE — 700111 HCHG RX REV CODE 636 W/ 250 OVERRIDE (IP): Performed by: NURSE PRACTITIONER

## 2024-08-07 PROCEDURE — 11043 DBRDMT MUSC&/FSCA 1ST 20/<: CPT | Performed by: NURSE PRACTITIONER

## 2024-08-07 PROCEDURE — 3074F SYST BP LT 130 MM HG: CPT | Performed by: NURSE PRACTITIONER

## 2024-08-07 PROCEDURE — 11043 DBRDMT MUSC&/FSCA 1ST 20/<: CPT

## 2024-08-07 PROCEDURE — 11046 DBRDMT MUSC&/FSCA EA ADDL: CPT

## 2024-08-07 RX ORDER — 0.9 % SODIUM CHLORIDE 0.9 %
10 VIAL (ML) INJECTION PRN
OUTPATIENT
Start: 2024-08-12

## 2024-08-07 RX ORDER — 0.9 % SODIUM CHLORIDE 0.9 %
3 VIAL (ML) INJECTION PRN
OUTPATIENT
Start: 2024-08-12

## 2024-08-07 RX ORDER — 0.9 % SODIUM CHLORIDE 0.9 %
VIAL (ML) INJECTION PRN
OUTPATIENT
Start: 2024-08-12

## 2024-08-07 RX ADMIN — ERTAPENEM 1000 MG: 1 INJECTION, POWDER, LYOPHILIZED, FOR SOLUTION INTRAMUSCULAR; INTRAVENOUS at 15:38

## 2024-08-07 NOTE — PROGRESS NOTES
Pt presented to [facility] for daily IV Invanz. POC discussed and pt verbalized agreement, voices no new concerns. LUE PICC in place, flushes with ease and brisk positive blood return noted, dressing CDI. Claves changed per [facility] policy. Invanz administered per MAR and pt tolerated well. No signs of adverse reaction or complications noted. PICC line flushed per policy. Pt confirmed tomorrow's appt and discharged in his motorized w/c to self care. Pt in NAD at time of discharge.

## 2024-08-07 NOTE — PATIENT INSTRUCTIONS
-Keep your wound dressing clean, dry, and intact. Only change dressing if it's over saturated, soiled or falls off.     -Wound vac may not have any drainage in tube or cannister & it will still be working.   Change cannister if it does become full by pressing tab on side of machine to remove canister and snap on new one. Full canister can be thrown in the trash. If cannister fills with bright red blood - go to ER. Dressing will be changed every MWF at the wound clini.  If you are having issues with your wound VAC, please consider patching leaks, changing the canister, or calling [phone] for troubleshooting. If the wound VAC has been off or un-operational for over 2 hours, call wound care center to inform them and remove all dressings including black foam and replace with normal saline damp gauze.     -Total contact casting - Do not get cast wet. If wet, call clinic immediately for removal. Do not walk on cast without boot. If you are experiencing any pain, call your provider. If the wound center is closed, go to the emergency room.    -Should you experience any significant changes in your wound(s), such as infection (redness, swelling, localized heat, increased pain, fever > 101 F, chills) or have any questions regarding your home care instructions, please contact the wound center at [phone]. If after hours, contact your primary care physician or go to the hospital emergency room.

## 2024-08-07 NOTE — PROGRESS NOTES
VAC dressing removed. Wound bed inspected and no residual foam noted. NPWT changed this visit using 2 pieces of black foam. Pump set to neg 125mmhg continuous. No leaks noted.       Home health orders routed to [facility] via [facility].

## 2024-08-07 NOTE — PROGRESS NOTES
Provider Encounter- Pressure Injury        HISTORY OF PRESENT ILLNESS  Wound History:    START OF CARE IN CLINIC: 1/31/2024 (return to clinic after hospitalization)    REFERRING PROVIDER: Racquel York       WOUNDS-superior coccyx pressure injury, stage IV-resolved                                 Coccyx pressure injury, stage IV-resolved           Inferior coccyx pressure injury, stage IV resolved                                 Right ischial pressure injury, stage IV                                 Left heel pressure injury, recurring stage III-resolved                                 Left posterior lower leg/calf-stage III-resolved                                 Left medial lower leg surgical wound dehiscence-resolved, reopens frequently-resolved            Left ischial pressure injury, stage IV-first observed in clinic 5/1/2024          HISTORY: Patient with history of incomplete quadriplegia referred to [facility] for treatment of a stage IV pressure injury.  He has a history of previous pressure injuries to this area, and underwent muscle flaps in 2019, and then again in 2020.  He was seen in the wound clinic in November 2021 for an ulcer proximal from his current ulcer, and pressure injuries to his left posterior lower leg and left heel.  At that time, it was discovered that the patient had retained VAC foam embedded in the wound bed of the sacral wound.  Attempts were made to get him back to his plastic surgeon, though unsuccessful.  In January he underwent surgical removal of VAC sponge along with excisional debridement of his sacral wound by Dr. Chaves.  After the surgery, his wound went on to heal without incident.   In early April 2022, his home health nurse noted a new sacral ulcer, below the previous ulcer which quickly tripled in size over the following weeks.  The ulcer to his left medial lower leg had also deteriorated, with bone visible at the base..  He was hospitalized from 4/22 until 4/27/2022 and  underwent surgery with Dr. Raman on 4/26 for irrigation and debridement of multiple compartments of the left lower extremity, bone excision, and complex closure of chronic wound using biologic skin substitute.   His sacrococcygeal wound was not surgically addressed during this admission.  He was discharged back to his group home, with home health, and referral to outpatient wound clinic for his sacral wound.  He was instructed to follow-up with his surgeon for his lower leg wound.       Postoperatively, the left medial lower leg incision dehisced.  He was seen by his surgeon at [facility] on 5/11.  The surgeon opted to leave remaining sutures in place, and refer him to the wound clinic for treatment of this wound.   Treatment of this wound was initiated in clinic on 5/12.  During this visit was also noted that his heel DTI had resolved, but that he had a new pressure injury to his left posterior lower extremity.     A new pressure injury was noted to patient's right upper buttock/lower back on 5/20/2022.  Wound was linear in shape, skin discolored but intact.     Abrasion noted to left anterior lower leg.  First observed in clinic on 7/22/2022.  Patient states he bumped his leg into his food tray.     Small DTI noted to patient's left lateral lower leg on 7/29/2022.  Skin intact but discolored.     Large area of deep tissue injury noted to patient's left exterior lower leg.  Patient denied any trauma to this area.  Skin intact.  Wound documented.    1/27/2023: Patient was admitted to [facility] from 1/23-1/25/2023 with gross hematuria. He underwent RICHARD which showed watchman device was in place and he was taken off of Xarelto. While hospitalized wound team was consulted. He was referred back to [facility] and home health upon discharge.    Patient was hospitalized at [facility] for pyelonephritis from 2/26 until 3/2/2023, admitted for fever and general malaise.  He was admitted and initially started on linezolid and meropenem for suspected  UTI and history of multidrug-resistant organisms.  Urine cultures were negative. ID was consulted, recommended CT of chest and abdomen,which were negative for acute findings. However, he was treated with 5 days total course of antibiotics for suspected UTI, and symptoms completely resolved.  During this admission, the inpatient wound team was consulted for treatment of his sacral and lower leg wounds.  A wound culture was taken from his left heel pressure ulcer, negative.  Once stabilized, he was discharged home and referred back to [facility] to resume treatment of his wounds.    Patient was hospitalized at [facility] from 12/11 until 12/23/2023, admitted for fever.  Wound infection suspected.  CT scan of abdomen and pelvis for evaluation of sacral pressure injury showed gas tracking down to the bone consistent with osteomyelitis.  He underwent I&D of right ischial ulcer (documented as buttock) with Dr. Bansal, medial tract leading to an abscess was identified.  Cavity was opened allowing it to drain into the main wound bed.  Wound VAC was placed and managed by wound team during this admission.  A bone scan of patient's left foot was also done, initially concerning for osteomyelitis.  Orthopedic surgery was consulted and did not recommend surgical intervention. ID consulted also, recommended the patient to receive IV ertapenem 1 g every 24 hours plus IV daptomycin 8 mg/kg every 24 hours through 1/22/2024.   He was discharged to [facility] on 1/23 for IV antibiotics and wound care.  From the LTAC he was discharged home on 1/22 with home health and referral back to [facility] to resume management of his wounds  .      Pertinent Medical History: Incomplete quadriplegia, history of stage IV pressure injuries, history of flap procedures to sacral pressure injuries, osteomyelitis, obesity, colostomy in place   Contributing factors: Immobility and Obesity, impaired sensation    Personal support: Attendant-staff at care home and home health  nursing    TOBACCO USE:   Former smoker, quit in 1977.  Never used smokeless tobacco    Patient's problem list, allergies, and current medications reviewed and updated in Epic    Interval History:  Interval History thinned 7/29/2022.  Please see previous notes for complete interval history.   Interval History thinned 1/27/2023. Please see previous note for complete interval history.  Interval History thinned 3/3/2023.  Please see previous notes for complete interval history.    Interval History thinned 8/4/2023.  Please see previous notes for complete interval history.    Interval History thinned 1/31/2024.  Please see previous notes for complete interval history.    Interval History thinned 6/26/2024.  Please see previous notes for complete interval history.      6/19/2024: Clinic visit with Steve Hodges MD. Patient denies any fevers or chills. Reports he is tolerating Abx. He has appointment with ID later today to discuss OM treatment. He is tolerating wound VAC. Slight bone exposed bilaterally in ischial wounds, slightly worse.    6/26/2024 : Clinic visit with ADILSON Arthur, FNPEGGY-BC, CWDINESHN, CFTRACI.   Patient presents today with significant deterioration of his both ischial wounds, with increased depth of undermining.   He now has a PICC line, and will be starting outpatient infusions of ertapenem later today.  He states he is feeling well, denies fevers, chills, nausea, vomiting, cough or shortness of breath.  Due to deterioration of his wound, we did discuss possibly having him go over the hospital for surgical debridement and IV antibiotics.  He was hesitant to do so.  We agreed to see how he looks after a week or so IV antibiotics.  He is agreeable to minimizing time up in his wheelchair.  He also agrees to go to the emergency room immediately if he develops any signs or symptoms of infection.    7/10/2024: Clinic visit with Steve Hodges MD. Patient reports feeling in normal state of health. He missed  last appointment as he had fall out of wheelchair and was evaluated in ED. He denies any injuries from fall. Patient wounds are measuring slightly smaller. He continues on Ertapenem. Patient reports that he has appointment for seating evaluation from [facility] scheduled, but does not recall the date.    7/17/2024 : Clinic visit with ADILSON Arthur, HOLDEN, LILIYA VALERIO.   Anjum states he is feeling well.  Left ischial wound measures significantly smaller today, however measurements vary somewhat depending on pt's position.  Both wounds appear to be progressing slightly, with improving tissue quality.    7/24/2024 : Clinic visit with ADILSON Arthur FNP-BC, LILIYA VALERIO.   Patient continues to feel well, offers no complaints.  Right ischial wound measures smaller, left ischial wound is larger.  Patient tolerating VAC without any difficulty.  Home health continues to see him in between clinic visits.  He is still receiving daily infusions of IV antibiotics, will be completing these in August.    7/31/2024 : Clinic visit with ADILSON Arthur FNP-BC, LILIYA VALERIO.   Anjum continues to feel well, his wounds are slowly progressing.  Tolerating VAC.  He is on IV antibiotics for 1 more week.  Admits that he is up in his wheelchair for 10 to 14 hours/day.    8/7/2024 : Clinic visit with ADILSON Arthur FNP-BC, LILIYA VALERIO.   Anjum states he is feeling well today, offers no complaints.  Both wounds measure a bit smaller today, new granulation tissue noted.  He will be getting his last IV antibiotic infusion today.    REVIEW OF SYSTEMS:   Unchanged from previous wound clinic assessment on 7/31/2024, except as noted in interval history above.      PHYSICAL EXAMINATION:   /68   Pulse 81   Temp 36.4 °C (97.5 °F) (Temporal)   Resp 18   SpO2 92%   Physical Exam  Constitutional:       Appearance: He is obese.   Cardiovascular:      Rate and Rhythm: Normal rate.   Pulmonary:      Effort: Pulmonary effort is normal.    Abdominal:      Comments: Colostomy left lower quadrant   Genitourinary:     Comments: Suprapubic catheter to down drain   Skin:     Comments: Stage IV pressure injury to right ischium-wound area has decreased.  Moderate serosanguineous drainage, no odor.  Thin layer of slough to wound bed.  Areas of new granulation tissue.    Stage IV pressure injury of left ischium-wound measures smaller.  Increased granulation tissue noted.  Moderate serosanguineous drainage, no odor.  Scattered areas of slough to wound bed.    All other wounds remain healed, high risk for recurrence     Neurological:      Mental Status: He is alert and oriented to person, place, and time.   Psychiatric:         Mood and Affect: Mood normal.         WOUND ASSESSMENT  Wound 12/12/23 Pressure Injury Ischium Right (Active)   Wound Image    08/07/24 1400   Site Assessment Red;Yellow 08/07/24 1400   Periwound Assessment Scar tissue 08/07/24 1400   Margins Unattached edges 08/07/24 1400   Drainage Amount Large 08/07/24 1400   Drainage Description Serosanguineous 08/07/24 1400   Treatments Cleansed;Provider debridement 08/07/24 1400   Offloading/DME Other (comment) 03/27/24 1625   Wound Cleansing Hypochlorus Acid 08/07/24 1400   Periwound Protectant Skin Protectant Wipes to Periwound;Drape 08/07/24 1400   Dressing Changed Changed 07/24/24 1600   Dressing Cleansing/Solutions Not Applicable 08/07/24 1400   Dressing Options Wound Vac;Offloading Dressing - Sacral 08/07/24 1400   Dressing Change/Treatment Frequency Monday, Wednesday, Friday, and As Needed 08/07/24 1400   Wound Team Following Weekly 06/05/24 1300   WOUND NURSE ONLY - Pressure Injury Stage 4 08/07/24 1400   Non-staged Wound Description Not applicable 05/08/24 1500   Wound Length (cm) 3 cm 08/07/24 1400   Wound Width (cm) 2 cm 08/07/24 1400   Wound Depth (cm) 1.3 cm 08/07/24 1400   Wound Surface Area (cm^2) 6 cm^2 08/07/24 1400   Wound Volume (cm^3) 7.8 cm^3 08/07/24 1400   Post-Procedure  Length (cm) 3 cm 08/07/24 1400   Post-Procedure Width (cm) 2.1 cm 08/07/24 1400   Post-Procedure Depth (cm) 1.3 cm 08/07/24 1400   Post-Procedure Surface Area (cm^2) 6.3 cm^2 08/07/24 1400   Post-Procedure Volume (cm^3) 8.19 cm^3 08/07/24 1400   Wound Healing % 86 08/07/24 1400   Tunneling (cm) 0 cm 07/31/24 1629   Tunneling Clock Position of Wound 2 07/10/24 1615   Undermining (cm) 0.9 cm 08/07/24 1400   Undermining of Wound, 1st Location From 4 o'clock;To 9 o'clock 08/07/24 1400   Undermining (cm) - 2nd location 3.4 cm 08/07/24 1400   Undermining of Wound, 2nd Location From 12 o'clock;To 2 o'clock 08/07/24 1400   Wound Odor None 08/07/24 1400   Exposed Structures Fascia 08/07/24 1400   Number of days: 239       Wound 02/16/24 Full Thickness Wound Leg Medial Left (Active)   Wound Image   08/07/24 1400   Site Assessment Epithelialization 08/07/24 1400   Periwound Assessment Scar tissue 08/07/24 1400   Margins Attached edges 05/29/24 1600   Drainage Amount None 08/07/24 1400   Drainage Description Serosanguineous 06/05/24 1300   Treatments Site care;Cleansed 08/07/24 1400   Wound Cleansing Foam Cleanser/Washcloth 08/07/24 1400   Periwound Protectant Not Applicable 08/07/24 1400   Dressing Cleansing/Solutions Not Applicable 08/07/24 1400   Dressing Options Offloading Dressing - Heel;Offloading Dressing - Sacral;Tubigrip 08/07/24 1400   Dressing Change/Treatment Frequency Monday, Wednesday, Friday, and As Needed 08/07/24 1400   Wound Team Following Weekly 06/05/24 1300   Non-staged Wound Description Not applicable 08/07/24 1400   Wound Length (cm) 0 cm 07/17/24 1515   Wound Width (cm) 0 cm 07/17/24 1515   Wound Depth (cm) 0 cm 07/17/24 1515   Wound Surface Area (cm^2) 0 cm^2 07/17/24 1515   Wound Volume (cm^3) 0 cm^3 07/17/24 1515   Post-Procedure Length (cm) 0 cm 07/10/24 1615   Post-Procedure Width (cm) 0 cm 07/10/24 1615   Post-Procedure Depth (cm) 0 cm 07/10/24 1615   Post-Procedure Surface Area (cm^2) 0 cm^2  07/10/24 1615   Post-Procedure Volume (cm^3) 0 cm^3 07/10/24 1615   Wound Healing % 100 07/17/24 1515   Tunneling (cm) 0 cm 07/31/24 1629   Undermining (cm) 0 cm 07/31/24 1629   Wound Odor None 08/07/24 1400   Exposed Structures None 08/07/24 1400   Number of days: 173       Wound 05/01/24 Pressure Injury Ischium Left (Active)   Wound Image    08/07/24 1400   Site Assessment Red;Yellow 08/07/24 1400   Periwound Assessment Denuded;Scar tissue 08/07/24 1400   Margins Unattached edges 08/07/24 1400   Closure Secondary intention 05/08/24 1500   Drainage Amount Large 08/07/24 1400   Drainage Description Serosanguineous 08/07/24 1400   Treatments Cleansed;Provider debridement 08/07/24 1400   Wound Cleansing Hypochlorus Acid 08/07/24 1400   Periwound Protectant Skin Protectant Wipes to Periwound;Hydrocolloid;Drape 08/07/24 1400   Dressing Status Intact 05/01/24 1600   Dressing Changed New 06/05/24 1300   Dressing Cleansing/Solutions Not Applicable 08/07/24 1400   Dressing Options Wound Vac;Offloading Dressing - Sacral 08/07/24 1400   Dressing Change/Treatment Frequency Monday, Wednesday, Friday, and As Needed 08/07/24 1400   Wound Team Following Weekly 06/05/24 1300   WOUND NURSE ONLY - Pressure Injury Stage 4 08/07/24 1400   Non-staged Wound Description Full thickness 06/05/24 1300   Wound Length (cm) 5.5 cm 08/07/24 1400   Wound Width (cm) 2.8 cm 08/07/24 1400   Wound Depth (cm) 3.9 cm 08/07/24 1400   Wound Surface Area (cm^2) 15.4 cm^2 08/07/24 1400   Wound Volume (cm^3) 60.06 cm^3 08/07/24 1400   Post-Procedure Length (cm) 5.6 cm 08/07/24 1400   Post-Procedure Width (cm) 2.8 cm 08/07/24 1400   Post-Procedure Depth (cm) 3.9 cm 08/07/24 1400   Post-Procedure Surface Area (cm^2) 15.68 cm^2 08/07/24 1400   Post-Procedure Volume (cm^3) 61.152 cm^3 08/07/24 1400   Wound Healing % -[id number] 08/07/24 1400   Tunneling (cm) 0 cm 08/07/24 1400   Undermining (cm) 3.1 cm 08/07/24 1400   Undermining of Wound, 1st Location From 9  "o'clock;To 3 o'clock 08/07/24 1400   Wound Odor None 08/07/24 1400   Exposed Structures Bone 08/07/24 1400   Number of days: 98       PROCEDURE: Excisional debridement of right and left ischial wounds  -2% viscous lidocaine applied topically to wound beds for approximately 5 minutes prior to debridement  -Curette used to debride wound beds.  Excisional debridement was performed to remove devitalized tissue until healthy, bleeding tissue was visualized.   Total area debrided was 21.98 cm².  Tissue debrided into the muscle / fascia layer.    -Bleeding controlled with manual pressure.    -Wound care completed by wound RN, refer to flowsheet  -Patient tolerated the procedure well, without c/o pain or discomfort.    Pertinent Labs and Diagnostics:    Labs:     A1c: No results found for: \"HBA1C\"     [facility] results, 7/1/2022 (under media tab)    CRP 13    ESR 31      IMAGING:     X-ray left tib-fib ordered 2/16/2024 through [facility]    12/11/2023-CT of abdomen pelvis with contrast  IMPRESSION:   1.  Right ischial decubitus ulcer extending to bone with soft tissue gas tracking along the right perineum. Appearance suggesting osteomyelitis, consider component of necrotizing fasciitis as clinically appropriate.  2.  Small pericardial effusion  3.  Left adrenal nodule, density on prior noncontrast CT demonstrates adenoma.  4.  Hepatomegaly  5.  Enlarged prostate, workup and evaluation for causes of prostate enlargement recommended as clinically appropriate.  6.  Atherosclerosis and atherosclerotic coronary artery disease    12/15/2023-bone scan of left foot  IMPRESSION:     1.  Mild increased activity in the LEFT 1st and 3rd toes on blood pool and delayed images possibly indicating inflammation/infection.  2.  No significant blood flow asymmetry.          VASCULAR STUDIES: No results found.    LAST  WOUND CULTURE:   Lab Results   Component Value Date/Time    CULTRSULT Moderate growth mixed enteric ade. (A) " 06/05/2024 01:40 PM    CULTRSULT Bacteroides ovatus group  Moderate growth   (A) 06/05/2024 01:40 PM    CULTRSULT Beta Streptococcus Group C  Moderate growth   (A) 06/05/2024 01:40 PM      PATHOLOGY  2/17/2023-bone fragment extracted from left lower extremity wound\\  FINAL DIAGNOSIS:     A. Left leg bone fragment at base of chronic wound:          Extensively degenerated fibrocartilaginous tissue with a rim of           fibrinopurulent debris          Correlate with culture findings            Comment: While no residual intact bone is identified, these           findings are suggestive of adjacent osteomyelitis.      ASSESSMENT AND PLAN:     1. Sacral decubitus ulcer, stage IV (HCC)  Comments: Ulcer first noted in early April 2022 as small open area which quickly enlarged.  This ulcer is present distal from previous sacral ulcer which healed after surgery in January.  Patient has history of flap reconstruction x2 to this area.    8/7/2024: Remains resolved, high risk for recurrence  -Patient does spend a lot of time up in his wheelchair, and wishes to continue to do so for his quality of life.  He lives independently, drives, and is involved with family activities.  He does have a [employer] wheelchair cushion, suspect may be inadequate. Has been referred to [facility] for repeat seating evaluation.  - Known OM that was previously treated. CT scan done during recent hospitalization did not show OM of sacrum, however OM of the ischium noted.  -Monitor site each clinic visit    Wound care: Silicone foam pressure relieving dressing    2. Pressure injury of right ischium, stage 4 (HCC)  Comments: Abscess and OM found on CT during hospitalization in December 2023.  Patient underwent I&D with VAC placement.  IV antibiotics through 1/22/2024.    8/7/2024: Wound area has again decreased.  As previously noted, measurements may vary based on patient's position.  New granulation tissue is noted.  Scattered areas of slough  -  Excisional debridement of nonviable tissue from wound in clinic today, medically necessary to promote wound healing  -Home health has been instructed to continue Dakins soaked gauze to wound for prior to placement of new VAC dressing as needed for odor / slough.  -Continue wound VAC to right ischial wound  -Patient to return to clinic weekly for assessment and debridement  -Home health to change dressing 2 times per week in between clinic visits.  Dakin's soaks for 15 minutes each dressing change  -Patient is very well versed on pressure relief measures, has adequate surface on bed, alternating low air loss.  -Seating eval ordered through [employer] has been scheduled, he does not recall when.    Wound care:  NPWT at -125 mmHg to accelerate granulation, change 3 times per week, sacral offloading foam.    3. Pressure injury of left ischium, stage 4 ([facility])    8/7/2024: Wound measures smaller today.  However, accurate measurements are difficult, dependent on patient position.  Increased granulation tissue noted  - Excisional debridement of wound in clinic today, medically necessary to promote wound healing.  - Pathology on bone fragment removed on previous visit was positive for acute OM  - Continue wound VAC  - Patient to return to clinic weekly for assessment and debridement  - Continue NPWT    Wound care: NPWT at -125 mmHg to accelerate granulation, change 3 times per week, sacral offloading foam.    4. Other acute osteomyelitis, other site ([facility])    8/7/2024: Bone fragments removed on a previous visit were sent for pathology, polymicrobial, most concerning was an MDR ESBL Proteus.   -Patient has been receiving ertapenem infusions per ID.  He will be receiving his last infusion today  -PICC line in place, will likely be removed after last infusion  -Patient understands he has a very low threshold for infection/sepsis.  He understands he is to go to the emergency room immediately if he begins to experience fevers,  chills, nausea or vomiting, or if he notices radiating erythema or purulent drainage from his wounds.  -Follow-up with ID    5. Postoperative wound dehiscence, subsequent encounter  6. Osteomyelitis of left lower extremity (HCC)  Comments: On 4/26/2022 patient underwent irrigation and debridement of multiple compartments of the left lower extremity states for pressure injury, with bone excision, and complex closure of chronic wound using biologic skin substitute.  During postop visit in surgeons office on 5/11, surgical site was noted to be dehisced.      8/7/2024: Wound is still covered with fragile epithelium, no drainage.  Historically has waxed and waned over the course of treatment.  Known osteomyelitis.  Patient has declined amputation  -No excisional debridement of these wounds required today  -Patient to be mindful of offloading when supine, should assure that his leg is not rotating medially  -Monitor site each clinic visit  Wound care: Silicone foam dressing, Tubigrip D    7. Pressure injury of left foot, stage 4 (HCC)  8. Pressure injury of left leg, stage 3 (HCC)  9. Pressure injury of left heel, stage 3 (HCC)    8/7/2020: All wounds remain resolved  - High risk for reoccurrence  - Continue to monitor  - Patient aware of offloading.    10. Quadriplegia, C5-C7 incomplete ([facility])  Comments: Complicating factor.  Impaired mobility and sensation  -Patient is still spending 7-8 hours/day up in his wheelchair, he has Roho cushion nearly 5 years old, and knows to reposition frequently.  Wears heel float boots bilaterally at all times  - Patient reports that he has seating evaluation with [employer] upcoming.    Please note that this note may have been created using voice recognition software. I have worked with technical experts from [employer] to optimize the interface.  I have made every reasonable attempt to correct obvious errors, but there may be errors of grammar and possibly content that I did not  discover before finalizing the note.

## 2024-08-08 NOTE — PROGRESS NOTES
Pt arrived in  for last day of antibiotics, denies c/o, states he is feeling good and happy to be done.  Right arm PICC flushed with good blood return, Ertapenem infused per orders, PICC line flushed and removed per protocol with tip intact.  Pressure dressing applied, pt observed for 20 minutes after, no bleeding noted from site, instructions reviewed for PICC line removal, pt verbalized understanding.  Pt Dc'd home without incident and will f/u with provider for further care.

## 2024-08-09 ASSESSMENT — ENCOUNTER SYMPTOMS
FEVER: 0
DIZZINESS: 0
SPUTUM PRODUCTION: 0
CHILLS: 0
WEIGHT LOSS: 0
HEADACHES: 0
SHORTNESS OF BREATH: 0
FOCAL WEAKNESS: 1
BLURRED VISION: 0
MYALGIAS: 0
COUGH: 0
NAUSEA: 0
PALPITATIONS: 0
WHEEZING: 0
ABDOMINAL PAIN: 0
BRUISES/BLEEDS EASILY: 0
NERVOUS/ANXIOUS: 0
DOUBLE VISION: 0
VOMITING: 0

## 2024-08-09 NOTE — PROGRESS NOTES
INFECTIOUS  DISEASE  OUTPATIENT CLINIC  NOTE   Subjective   Primary care provider: KATE Phillips.     Reason for Follow Up:   Follow-up for   1. Pressure ulcers of skin of multiple topographic sites        2. History of infection due to extended spectrum beta lactamase (ESBL) producing bacteria        3. Acute hematogenous osteomyelitis of multiple sites (HCC)        4. Quadriplegia, C5-C7 complete (HCC)        5. History of frequent urinary tract infections        6. Suprapubic catheter (HCC)          HPI: Referred back to ID clinic, known to ID team for previous Ischial osteomyelitis with abscess   Osvaldo Pate is a 73 y.o. 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   6 weeks of IV antibiotics for sacral osteomyelitis in 3/23/22. He was hospitalized from 4/22 until 4/27/2022 and underwent surgery with Dr. Raman on 4/26 for irrigation and debridement of multiple compartments of the left lower extremity, bone excision, and complex closure of chronic wound using biologic skin substitute.  Patient also with history of ESBL infection in urine noted in 4/2023. s/p debridement necrotic muscle and bone 12/12/23. Operative cultures 12/12/23 ESBL Proteus (also fluoroquinolone and Bactrim resistant), pan susceptible Enterococcus, Prevotella. Completed 6 weeks of IV ertapenem  + IV daptomycin on 1/22/2024.  Patient continued to follow-up with wound care on a regular basis.  Most recently on 6/5/2024 patient followed up with wound care and was noted to have Left ischial wound with exposed bone and with few small bone fragments and some slough. He does report copious drainage from left ischial wound.  Wound team obtained culture and also sent a small dislodged component of bone to pathology.   Pathology results positive for acute osteomyelitis, negative for malignancy. Cultures +  Proteus mirabilis esbl, Escherichia coli (pan sensitivity), and group G strep.  Referred to ID clinic for antibiotic therapy     06/19/24- Today Patient reports feeling well.  Pt being followed by the [facility]. Wound pictures reviewed in EPIC. Denies drainage, pungent odor, redness, pain. Denies feeling generally ill, fevers/chills, general malaise, headache, n/v/d.  Bilateral pelvic wounds.  See clinical photo attached below.  After further review of cultures will start the patient on IV ertapenem 1 g every 24 hours, 6-week course with end date tentatively for 8/7/2024 based upon when PICC line and antibiotic therapy is started.  Most likely will alter end date.  Orders placed for home infusion but due to patient's insurance covered may need to go into infusion center.  Patient states that he is able to drive and once daily to infusion center and if needed but would prefer home infusion if possible.  Orders to be faxed to outpatient infusion center for price quote and will update patient.  Medication education provided.  Weekly labs CBC/CMP while on IV antibiotics.  Repeat labs today CBC/CMP and has recent INR for PICC line placement.  Continue wound care on a regular basis.    7/24/24-patient following up while on IV ertapenem 1 g every 24 hours, 6-week course with end date updated to 8/7/2024.  Patient reports he has been tolerating ertapenem with no complaints of side effects.  Most recent labs reviewed from 7/22/2024, WBC 6.7, stable anemia 11.4/36.3, neutrophils WNL, CMP mostly unremarkable.  Patient following up with wound care on a regular basis.  Most recently on 7/17  Left ischial wound measures significantly smaller, however measurements vary somewhat depending on pt's position.  Both wounds appear to be progressing slightly, with improving tissue quality.  Continue IV ertapenem until end date 8/7/2024 then  "okay to remove PICC after last IV infusion.  Today the patient denies any nausea, vomiting, fever, chills, constipation or changes in stool from colostomy.     8/14/24-patient following up after completion of a 6-week course of IV ertapenem which ended on 8/7/2024.  He reports he tolerated IV ertapenem with no complaints of side effects.  Today he denies any nausea, vomiting, fever, chills, constipation or diarrhea.  He continues to follow-up with wound clinic on a regular basis.  Bilateral pelvic wounds both measuring mildly smaller but both continue to probe to level bone..  No further need for antibiotic therapy at this time.  Given ongoing wound patient is always at risk for recurrent/secondary infections.  Previous lab work reviewed from 8/5/2024, WBC 7.2, stable anemia 11.7/35.8, neutrophils WNL, CMP unremarkable.  Recommended continue follow-up with wound care on a regular basis.  If signs or symptoms of recurrence of infection recommend repeat cultures to guide antibiotic therapy.     states he cannot have MRI because he has, \"ferrous metal\" in his body, placed in the 1970s.   Current Antimicrobials: IV  ertapenem 1 g every 24 hours, 6-week course with end date of 8/7/2024  Previous Antimicrobials:    Other Current Medications:  Home Medications    Medication Sig Taking? Last Dose Authorizing Provider   baclofen (LIORESAL) 20 MG tablet TAKE 1 TABLET BY MOUTH 4 TIMES DAILY Yes Taking Wallace Moyer, A.P.R.N.   Mirabegron ER (MYRBETRIQ) 50 MG TABLET SR 24 HR Take 50 mg by mouth every day. Yes Taking Wallace Joaquinsen, A.P.R.N.   NON SPECIFIED Take 3 Capsules by mouth every day. Balance of Nature-Whole Produce FRUITS-patient to provide supply Yes Taking Physician Outpatient   NON SPECIFIED Take 3 Capsules by mouth every day. Balance of Nature-Whole Produce VEGGIES-patient to provide supply Yes Taking Physician Outpatient   Sodium Hypochlorite (DAKINS 0.125%, 1/4 STRENGTH,) 0.125 % Solution Apply 473 mL " topically two times a week. Yes Taking ANITA DuenasP.NDeniz   Nutritional Supplements (NUTRITIONAL DRINK PO) Take  by mouth. Yes Taking Physician Outpatient   amLODIPine (NORVASC) 5 MG Tab Take 1 Tablet by mouth as needed (Per MAR if blood pressure if less than 130/80). Yes Taking ANITA PhillipsP.R.MURPHY.   losartan (COZAAR) 50 MG Tab Take 1 Tablet by mouth every day. Yes Taking JF Phillips.P.R.N.   docusate sodium (COLACE) 100 MG Cap Take 2 Capsules by mouth 2 times a day. Yes Taking Lex Meier M.D.   Simethicone (GAS-X PO) Take 1 Tablet by mouth 2 times a day as needed (For gas). Yes Taking Physician Outpatient   acetaminophen (TYLENOL) 500 MG Tab Take 1,000 mg by mouth every 6 hours as needed for Moderate Pain. Yes Taking Physician Outpatient   diclofenac sodium (VOLTAREN) 1 % Gel Apply 1 g topically 4 times a day. Apply to both elbows Yes Taking ANITA PrettyP.R.NDeniz   polyethylene glycol/lytes (MIRALAX) 17 g Pack Take 17 g by mouth every day. Indications: Constipation Yes Taking Physician Outpatient   aspirin EC 81 MG EC tablet Take 1 Tablet by mouth every day. Yes Taking ANITA RussP.R.MURPHY.   Zinc 50 MG Tab Take 1 Tablet by mouth every morning. Yes Taking Physician Outpatient   Cholecalciferol (VITAMIN D3) 2000 UNIT Cap Take 1 Capsule by mouth every morning. Yes Taking Physician Outpatient   Magnesium 400 MG Tab Take 400 mg by mouth every morning. Yes Taking Physician Outpatient   Ascorbic Acid (VITAMIN C) 1000 MG Tab Take 1 Tablet by mouth every morning. Yes Taking Physician Outpatient   melatonin 5 mg Tab Take 10 mg by mouth at bedtime. Gummie Yes Taking Physician Outpatient   Probiotic Product (PROBIOTIC PO) Take 1 Capsule by mouth every morning. Yes Taking Physician Outpatient   sennosides (SENOKOT) 8.6 MG Tab Take 17.2 mg by mouth 2 times a day. Yes Taking Physician Outpatient   ferrous sulfate 325 (65 Fe) MG tablet Take 1 Tablet by mouth 2 times a day. Yes  "Taking Physician Outpatient        PMH:  Past Medical History:   Diagnosis Date    A-fib ([facility])     Acute cystitis without hematuria 10/23/2021    Acute UTI 06/18/2023    Arrhythmia     Atrial flutter ([facility])     Blood clotting disorder ([facility])     Patient is on Xarelto    Bowel habit changes     Colostomy    Clot hematuria 01/23/2023    GERD (gastroesophageal reflux disease)     Hypertension     Hypokalemia 06/18/2023    Kidney stones     Metabolic acidosis 06/18/2023    Neurogenic bladder     S/P cath    Open wound 11/18/2022    sacrum with wound vac, left ankle, [facility]    Quadriplegia, C5-C7 complete ([facility])     patient reports \" incomplete quad\"    Sepsis secondary to UTI ([facility]) 06/17/2023    SIRS (systemic inflammatory response syndrome) ([facility]) 12/12/2023    Suprapubic catheter ([facility])      Past Surgical History:   Procedure Laterality Date    IRRIGATION & DEBRIDEMENT GENERAL Right 12/12/2023    Procedure: IRRIGATION AND DEBRIDEMENT, WOUND/BUTTOCKS;  Surgeon: Huan Bansal M.D.;  Location: [facility];  Service: General    IRRIGATION & DEBRIDEMENT GENERAL Left 04/26/2022    Procedure: IRRIGATION AND DEBRIDEMENT, WOUND - LEG;  Surgeon: Eric Raman M.D.;  [place];  Service: Orthopedics    WOUND CLOSURE NEURO Left 04/26/2022    Procedure: CLOSURE, WOUND;  Surgeon: Eric aRman M.D.;  [place];  Service: Orthopedics    ORTHOPEDIC OSTEOTOMY Left 04/26/2022    Procedure: OSTECTOMY;  Surgeon: Eric Raman M.D.;  [place];  Service: Orthopedics    INCISION AND DRAINAGE ORTHOPEDIC Left 01/27/2022    Procedure: INCISION AND DRAINAGE, WOUND, BY ORTHOPEDICS;  Surgeon: Erasmo Stewart M.D.;  [place];  Service: Orthopedics    BONE BIOPSY Left 01/27/2022    Procedure: BIOPSY, BONE;  Surgeon: Erasmo Stewart M.D.;  [place];  Service: Orthopedics    INCISION AND DRAINAGE ORTHOPEDIC  " 01/22/2022    Procedure: INCISION AND DRAINAGE, WOUND, BY ORTHOPEDICS;  Surgeon: Erasmo Stewart M.D.;  [place];  Service: Orthopedics    PA CYSTOSCOPY,INSERT URETERAL STENT Left 01/04/2022    Procedure: CYSTOSCOPY, WITH URETERAL STENT INSERTION;  Surgeon: Osvaldo Baltazar M.D.;  [place];  Service: Urology    PA CYSTO/URETERO/PYELOSCOPY, DX Left 01/04/2022    Procedure: URETEROSCOPY;  Surgeon: Osvaldo Baltazar M.D.;  [place];  Service: Urology    LASERTRIPSY N/A 01/04/2022    Procedure: LITHOTRIPSY, USING LASER;  Surgeon: Osvaldo Baltazar M.D.;  [place];  Service: Urology    PA CYSTOSCOPY,INSERT URETERAL STENT Left 12/16/2021    Procedure: CYSTOSCOPY, WITH URETERAL STENT INSERTION;  Surgeon: Aly Bowen M.D.;  Location: [facility];  Service: Urology    PA CYSTO/URETERO/PYELOSCOPY, DX Left 12/16/2021    Procedure: URETEROSCOPY;  Surgeon: Aly Bowen M.D.;  Location: [facility];  Service: Urology    LASERTRIPSY Left 12/16/2021    Procedure: LITHOTRIPSY, USING LASER;  Surgeon: Aly Bowen M.D.;  Location: [facility];  Service: Urology    IRRIGATION & DEBRIDEMENT GENERAL  12/20/2020    Procedure: IRRIGATION AND DEBRIDEMENT, WOUND SACRAL ULCER;  Surgeon: Matt Cummins M.D.;  [place];  Service: Plastics    ULCER DEBRIDEMENT N/A 08/21/2019    Procedure: debridement of Sacral grade 4 ulcer - W/BONE BIOPSY, 3 liter wash out. bilateral sliding gluteal myocutaneous flap advancement;  Surgeon: Amadeo Moon M.D.;  Location: [facility];  Service: Plastics    FLAP CLOSURE  08/21/2019    Procedure: CLOSURE, FLAP - MUSCLE;  Surgeon: Amadeo Moon M.D.;  Location: [facility];  Service: Plastics    COLOSTOMY N/A 07/27/2019    Procedure: CREATION, COLOSTOMY -  placement;  Surgeon: Elías Hannah M.D.;  [facility]  Service: General    COLOSTOMY      COLOSTOMY TAKEDOWN      HERNIA REPAIR      ORIF, ANKLE      PERCUTANEOUOSPINNING LOWER EXTREMITY       Family History   Problem Relation Age of Onset    Heart Disease Father      Social History     Socioeconomic History    Marital status:      Spouse name: Not on file    Number of children: Not on file    Years of education: Not on file    Highest education level: Not on file   Occupational History    Not on file   Tobacco Use    Smoking status: Former     Current packs/day: 0.00     Average packs/day: 1 pack/day for 10.0 years (10.0 ttl pk-yrs)     Types: Cigarettes     Start date: 1967     Quit date: 1977     Years since quittin.6    Smokeless tobacco: Never   Vaping Use    Vaping status: Never Used   Substance and Sexual Activity    Alcohol use: Yes     Alcohol/week: 4.2 oz     Types: 7 Standard drinks or equivalent per week     Comment: 2/day    Drug use: No    Sexual activity: Not on file   Other Topics Concern    Not on file   Social History Narrative    Not on file     Social Determinants of Health     Financial Resource Strain: Not on file   Food Insecurity: No Food Insecurity (2019)    Hunger Vital Sign     Worried About Running Out of Food in the Last Year: Never true     Ran Out of Food in the Last Year: Never true   Transportation Needs: No Transportation Needs (2019)    PRAPARE - Transportation     Lack of Transportation (Medical): No     Lack of Transportation (Non-Medical): No   Physical Activity: Not on file   Stress: Not on file   Social Connections: Not on file   Intimate Partner Violence: Not on file   Housing Stability: Not on file           Allergies/Intolerances:  Allergies   Allergen Reactions    Sulfa Drugs Rash     Rash, developed this back in  after being placed on \"sulfa antibiotic for my wound\". Antibiotic was stopped and rash went away. Patient states he had a sulfa antibiotic prior to that time back " "when he was younger w/o a reaction.          ROS:   Review of Systems   Constitutional:  Negative for chills, fever, malaise/fatigue and weight loss.   HENT:  Negative for congestion and hearing loss.    Eyes:  Negative for blurred vision and double vision.   Respiratory:  Negative for cough, sputum production, shortness of breath and wheezing.    Cardiovascular:  Negative for chest pain and palpitations.   Gastrointestinal:  Negative for abdominal pain, constipation, diarrhea, nausea and vomiting.   Genitourinary:  Negative for dysuria.        Cazares catheter   Musculoskeletal:  Negative for myalgias.   Skin:  Negative for itching and rash.   Neurological:  Positive for focal weakness (Paraplegia). Negative for dizziness and headaches.   Endo/Heme/Allergies:  Does not bruise/bleed easily.   Psychiatric/Behavioral:  The patient is not nervous/anxious.       ROS was reviewed and were negative except as above.    Objective    Most Recent Vital Signs:  /76 Comment: at wound care appt  Pulse 60 Comment: at wound care appt  Temp 36.6 °C (97.8 °F) Comment: at wound care appt  Resp 18   Ht 1.778 m (5' 10\")   Wt 97.5 kg (215 lb) Comment: patient reported/ pt in wheelchair  SpO2 97%   BMI 30.85 kg/m²     Physical Exam:  Physical Exam  Vitals reviewed.   Constitutional:       Appearance: Normal appearance. He is obese.      Comments: Wheelchair   HENT:      Head: Normocephalic and atraumatic.      Nose: Nose normal.      Mouth/Throat:      Mouth: Mucous membranes are moist.   Eyes:      Pupils: Pupils are equal, round, and reactive to light.   Cardiovascular:      Rate and Rhythm: Normal rate.   Pulmonary:      Effort: Pulmonary effort is normal.      Breath sounds: Normal breath sounds.   Abdominal:      Palpations: Abdomen is soft.   Genitourinary:     Comments: Cazares catheter  Musculoskeletal:         General: No tenderness.      Cervical back: Normal range of motion and neck supple.      Comments: Stage IV " pressure injury to right ischium-wound. Scattered slough to wound bed.  Areas of new granulation noted.  no odor.  No evidence of infection.  Probes to bone     Stage IV pressure injury of left ischium-wound. Scattered slough to wound bed.  Areas of new granulation.  No odor.  No evidence of infection.  Probes close to bone     Skin:     General: Skin is warm and dry.      Coloration: Skin is not jaundiced.      Findings: No erythema or rash.   Neurological:      Mental Status: He is alert and oriented to person, place, and time.      Motor: Weakness present.   Psychiatric:         Mood and Affect: Mood normal.         Behavior: Behavior normal.          Pertinent Lab/Imaging Results:  [unfilled]  @CMP@  WBC   Date/Time Value Ref Range Status   08/05/2024 04:29 PM 7.2 4.8 - 10.8 K/uL Final     RBC   Date/Time Value Ref Range Status   08/05/2024 04:29 PM 3.68 (L) 4.70 - 6.10 M/uL Final     Hemoglobin   Date/Time Value Ref Range Status   08/05/2024 04:29 PM 11.7 (L) 14.0 - 18.0 g/dL Final     Hematocrit   Date/Time Value Ref Range Status   08/05/2024 04:29 PM 35.8 (L) 42.0 - 52.0 % Final     MCV   Date/Time Value Ref Range Status   08/05/2024 04:29 PM 97.3 81.4 - 97.8 fL Final     MCH   Date/Time Value Ref Range Status   08/05/2024 04:29 PM 31.8 27.0 - 33.0 pg Final     MCHC   Date/Time Value Ref Range Status   08/05/2024 04:29 PM 32.7 32.3 - 36.5 g/dL Final     MPV   Date/Time Value Ref Range Status   08/05/2024 04:29 PM 8.6 (L) 9.0 - 12.9 fL Final      Sodium   Date/Time Value Ref Range Status   08/05/2024 04:29  135 - 145 mmol/L Final     Potassium   Date/Time Value Ref Range Status   08/05/2024 04:29 PM 4.0 3.6 - 5.5 mmol/L Final     Chloride   Date/Time Value Ref Range Status   08/05/2024 04:29  96 - 112 mmol/L Final     Co2   Date/Time Value Ref Range Status   08/05/2024 04:29 PM 22 20 - 33 mmol/L Final     Glucose   Date/Time Value Ref Range Status   08/05/2024 04:29 PM 94 65 - 99 mg/dL Final     Bun  "  Date/Time Value Ref Range Status   08/05/2024 04:29 PM 18 8 - 22 mg/dL Final     Creatinine   Date/Time Value Ref Range Status   08/05/2024 04:29 PM 0.47 (L) 0.50 - 1.40 mg/dL Final     Bun-Creatinine Ratio   Date/Time Value Ref Range Status   01/22/2024 06:47 AM 45.0 (H) 10.0 - 20.0 Final     Alkaline Phosphatase   Date/Time Value Ref Range Status   08/05/2024 04:29  (H) 30 - 99 U/L Final     AST(SGOT)   Date/Time Value Ref Range Status   08/05/2024 04:29 PM 15 12 - 45 U/L Final     ALT(SGPT)   Date/Time Value Ref Range Status   08/05/2024 04:29 PM 5 2 - 50 U/L Final     Total Bilirubin   Date/Time Value Ref Range Status   08/05/2024 04:29 PM 0.4 0.1 - 1.5 mg/dL Final      CPK Total   Date/Time Value Ref Range Status   12/20/2023 03:45 AM 52 0 - 154 U/L Final            Blood Culture Hold   Date Value Ref Range Status   04/15/2023 Collected  Final       Blood Culture Hold   Date Value Ref Range Status   04/15/2023 Collected  Final       SURGICAL PATHOLOGY CONSULTATION      FINAL DIAGNOSIS:    A. Left ischium bone:         Bone fragment with degenerative changes and focal acute          osteomyelitis         Negative for malignancy                                        Diagnosis performed by:                                      ARMAND CASAREZ DO  ------------------------------------------------------------------------  ---------------------------------------------------------------  CODES: 2002x1  2205x1    SPECIMEN(S)  A. Left ischium bone:    PERTINENT CLINICAL INFORMATION:  Infected wound (T14.8 XXA, L8.9)     GROSS DESCRIPTION:  A. Received in formalin labeled with the patient's name, medical record  number, and \"left ischium\" are 2 irregular calcified fragments of  tissue measuring 0.2-0.7 cm.  Decalcified in HCl.  1 /[id number] AGW    MICROSCOPIC DESCRIPTION:  Microscopic examination was performed.  Please see diagnosis.    Report electronically signed by:  ARMAND CASAREZ DO ,  Diagnosis performed " at: [facility]  Pathology  Department, [address] [place]  [address]      Printed 00/00/0000  [id number] NICHOLAS CASEY   Page 1 of 1 MRN#:[id number]      Specimen Collected: 06/05/24  1:35 PM Last Resulted: 06/07/24 10:02 AM      ntains abnormal data CULTURE TISSUE W/ GRM STAIN  Order: [id number]   Status: Final result       Visible to patient: Yes (seen)       Next appt: Today at 04:30 PM in Infectious Diseases (ANITA PenningtonP.REKTA)       Dx: Infected wound    Specimen Information: Bone   0 Result Notes      Component 2 wk ago   Significant Indicator POS Positive (POS)   Source BONE   Site LEFT ISCHIUM   Culture Result - Abnormal    Gram Stain Result Few Gram negative rods.   Culture Result  Abnormal   Escherichia coli  Moderate growth    Culture Result  Abnormal   Proteus mirabilis ESBL  Moderate growth  Extended Spectrum Beta-lactamase (ESBL) isolated.  ESBL's may be clinically resistant to therapy with  Penicillins,Cephalosporins or Aztreonam despite  apparent in vitro susceptibility to some of these agents.  The patient requires contact isolation.  Please contact pharmacy or an Infectious Disease Specialist  if you have any questions about appropriate therapy.    Culture Result  Abnormal   Beta Streptococcus Group G  Light growth    Resulting Agency M        Susceptibility     Escherichia coli Proteus mirabilis esbl     ROSE ROSE     Amoxicillin/CA <=8/4 mcg/mL Sensitive       Ampicillin <=8 mcg/mL Sensitive >16 mcg/mL Resistant     Ampicillin/sulbactam <=4/2 mcg/mL Sensitive 8/4 mcg/mL Sensitive     Cefazolin <=2 mcg/mL Sensitive >16 mcg/mL Resistant     Cefepime <=2 mcg/mL Sensitive >16 mcg/mL Resistant     Ceftriaxone <=1 mcg/mL Sensitive >32 mcg/mL Resistant     Cefuroxime <=4 mcg/mL Sensitive >16 mcg/mL Resistant     Ciprofloxacin <=0.25 mcg/mL Sensitive 1 mcg/mL Resistant     Ertapenem <=0.5 mcg/mL Sensitive <=0.5 mcg/mL Sensitive     Gentamicin <=2 mcg/mL Sensitive <=2 mcg/mL  Sensitive     Levofloxacin <=0.5 mcg/mL Sensitive 1 mcg/mL Intermediate     Minocycline <=4 mcg/mL Sensitive >8 mcg/mL Resistant     Moxifloxacin <=2 mcg/mL Sensitive >4 mcg/mL Resistant     Pip/Tazobactam <=8 mcg/mL Sensitive <=8 mcg/mL Sensitive     Tigecycline <=2 mcg/mL Sensitive       Tobramycin <=2 mcg/mL Sensitive <=2 mcg/mL Sensitive     Trimeth/Sulfa <=0.5/9.5 m... Sensitive >2/38 mcg/mL Resistant                    Narrative  Performed by: DORY  Left Ischium      Specimen Collected: 06/05/24  1:35 PM Last Resulted: 06/08/24  2:41 PM                        Impression/Assessment      1. Pressure ulcers of skin of multiple topographic sites        2. History of infection due to extended spectrum beta lactamase (ESBL) producing bacteria        3. Acute hematogenous osteomyelitis of multiple sites ([facility])        4. Quadriplegia, C5-C7 complete ([facility])        5. History of frequent urinary tract infections        6. Suprapubic catheter ([facility])          Osvaldo Pate is a 73 y.o. 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   6 weeks of IV antibiotics for sacral osteomyelitis in 3/23/22. He was hospitalized from 4/22 until 4/27/2022 and underwent surgery with Dr. Raman on 4/26 for irrigation and debridement of multiple compartments of the left lower extremity, bone excision, and complex closure of chronic wound using biologic skin substitute.  Patient also with history of ESBL infection in urine noted in 4/2023. s/p debridement necrotic muscle and bone 12/12/23. Operative cultures 12/12/23 ESBL Proteus (also fluoroquinolone and Bactrim resistant), pan susceptible Enterococcus, Prevotella. Completed 6 weeks of IV ertapenem  + IV daptomycin on 1/22/2024.  Patient continued to follow-up with wound care on a  regular basis.  Most recently on 6/5/2024 patient followed up with wound care and was noted to have Left ischial wound with exposed bone and with few small bone fragments and some slough. He does report copious drainage from left ischial wound.  Wound team obtained culture and also sent a small dislodged component of bone to pathology.  Pathology results positive for acute osteomyelitis, negative for malignancy. Cultures +  Proteus mirabilis esbl, Escherichia coli (pan sensitivity), and group G strep.  Referred to ID clinic for antibiotic therapy. IV ertapenem 1 g every 24 hours, 6-week course with end date tentatively for 8/7/2024 8/14/24-patient following up after completion of a 6-week course of IV ertapenem which ended on 8/7/2024.  He reports he tolerated IV ertapenem with no complaints of side effects.  Today he denies any nausea, vomiting, fever, chills, constipation or diarrhea.  He continues to follow-up with wound clinic on a regular basis.  Bilateral pelvic wounds both measuring mildly smaller but both continue to probe to level bone..  No further need for antibiotic therapy at this time.  Given ongoing wound patient is always at risk for recurrent/secondary infections.  Previous lab work reviewed from 8/5/2024, WBC 7.2, stable anemia 11.7/35.8, neutrophils WNL, CMP unremarkable.  Recommended continue follow-up with wound care on a regular basis.  If signs or symptoms of recurrence of infection recommend repeat cultures to guide antibiotic therapy.     -At risk for worsening wound infection, sepsis, hospital admissions, and death  PLAN:   - Completed 6 weeks of IV ertapenem on 8/7/2024.  No further need for antibiotic therapy  - Recommend routine follow up with wound care and orthopedic surgery  - Continue wound care to bilateral pelvis  - If signs or symptoms of infection recommend repeat cultures to guide antibiotic therapy due to increased resistances.  Due to continued expose bone high risk for  recurrence of osteomyelitis.  Most likely would require 6 weeks of antibiotic therapy  - Patient is scheduled for new wheelchair and pad to decrease offloading of sacrum  - Education provided on S/S of infection and when to report to ER/ call 911     Return visit: PRN. Follow up with primary care physician for chronic medical problems      I have performed a physical exam,  updated ROS and plan today. I have reviewed previous images, labs, and provider notes.      WESTON Pennington.    All Patients should seek medical re-evaluation or report to the ER for new, increasing or worsening symptoms. In some circumstances medical conditions can change from the initial evaluation and may require emergent medical re-evaluation. This includes but is not limited to chest pain, shortness of breath, atypical abdominal pain, atypical headache, ALOC, fever >101, low blood pressure, high respiratory rate (above 30), low oxygen saturation (below 90%), acute delirium, abnormal bleeding, inability to tolerate any intake, weakness on one side of the body, any worsened or concerning conditions.    Please note that this dictation was created using voice recognition software. I have worked with technical experts from [employer] to optimize the interface.  I have made every reasonable attempt to correct obvious errors, but there may be errors of grammar and possibly content that I did not discover before finalizing the note.

## 2024-08-10 ENCOUNTER — HOSPITAL ENCOUNTER (OUTPATIENT)
Dept: LAB | Facility: MEDICAL CENTER | Age: 74
End: 2024-08-10
Attending: INTERNAL MEDICINE
Payer: MEDICARE

## 2024-08-10 LAB
CORTIS SERPL-MCNC: 13.3 UG/DL (ref 0–23)
T3FREE SERPL-MCNC: 2.12 PG/ML (ref 2–4.4)
T4 FREE SERPL-MCNC: 1.4 NG/DL (ref 0.93–1.7)
TSH SERPL DL<=0.005 MIU/L-ACNC: 0.84 UIU/ML (ref 0.38–5.33)

## 2024-08-10 PROCEDURE — 82384 ASSAY THREE CATECHOLAMINES: CPT

## 2024-08-10 PROCEDURE — 82024 ASSAY OF ACTH: CPT

## 2024-08-10 PROCEDURE — 84439 ASSAY OF FREE THYROXINE: CPT

## 2024-08-10 PROCEDURE — 36415 COLL VENOUS BLD VENIPUNCTURE: CPT

## 2024-08-10 PROCEDURE — 83835 ASSAY OF METANEPHRINES: CPT

## 2024-08-10 PROCEDURE — 84481 FREE ASSAY (FT-3): CPT

## 2024-08-10 PROCEDURE — 84443 ASSAY THYROID STIM HORMONE: CPT

## 2024-08-10 PROCEDURE — 82533 TOTAL CORTISOL: CPT

## 2024-08-12 LAB — ACTH PLAS-MCNC: 6.8 PG/ML (ref 7.2–63.3)

## 2024-08-13 LAB
METANEPHS SERPL-SCNC: 0.12 NMOL/L (ref 0–0.49)
NORMETANEPHRINE SERPL-SCNC: 0.29 NMOL/L (ref 0–0.89)

## 2024-08-14 ENCOUNTER — OFFICE VISIT (OUTPATIENT)
Dept: WOUND CARE | Facility: MEDICAL CENTER | Age: 74
End: 2024-08-14
Payer: MEDICARE

## 2024-08-14 ENCOUNTER — OFFICE VISIT (OUTPATIENT)
Dept: INFECTIOUS DISEASES | Facility: MEDICAL CENTER | Age: 74
End: 2024-08-14
Attending: NURSE PRACTITIONER
Payer: MEDICARE

## 2024-08-14 VITALS
TEMPERATURE: 97.8 F | HEART RATE: 60 BPM | SYSTOLIC BLOOD PRESSURE: 128 MMHG | DIASTOLIC BLOOD PRESSURE: 76 MMHG | RESPIRATION RATE: 16 BRPM

## 2024-08-14 VITALS
BODY MASS INDEX: 30.78 KG/M2 | OXYGEN SATURATION: 97 % | SYSTOLIC BLOOD PRESSURE: 128 MMHG | HEART RATE: 60 BPM | DIASTOLIC BLOOD PRESSURE: 76 MMHG | WEIGHT: 215 LBS | RESPIRATION RATE: 18 BRPM | TEMPERATURE: 97.8 F | HEIGHT: 70 IN

## 2024-08-14 DIAGNOSIS — Z87.440 HISTORY OF FREQUENT URINARY TRACT INFECTIONS: ICD-10-CM

## 2024-08-14 DIAGNOSIS — Z93.59 SUPRAPUBIC CATHETER (HCC): ICD-10-CM

## 2024-08-14 DIAGNOSIS — L89.893 PRESSURE INJURY OF LEFT LEG, STAGE 3 (HCC): ICD-10-CM

## 2024-08-14 DIAGNOSIS — L89.154 SACRAL DECUBITUS ULCER, STAGE IV (HCC): ICD-10-CM

## 2024-08-14 DIAGNOSIS — L89.894 PRESSURE INJURY OF LEFT FOOT, STAGE 4 (HCC): ICD-10-CM

## 2024-08-14 DIAGNOSIS — L89.90 PRESSURE ULCERS OF SKIN OF MULTIPLE TOPOGRAPHIC SITES: ICD-10-CM

## 2024-08-14 DIAGNOSIS — M86.09 ACUTE HEMATOGENOUS OSTEOMYELITIS OF MULTIPLE SITES (HCC): ICD-10-CM

## 2024-08-14 DIAGNOSIS — L89.314 PRESSURE INJURY OF RIGHT ISCHIUM, STAGE 4 (HCC): ICD-10-CM

## 2024-08-14 DIAGNOSIS — M86.9 OSTEOMYELITIS OF LEFT LOWER EXTREMITY (HCC): ICD-10-CM

## 2024-08-14 DIAGNOSIS — G82.54 QUADRIPLEGIA, C5-C7, INCOMPLETE (HCC): ICD-10-CM

## 2024-08-14 DIAGNOSIS — M86.18 OTHER ACUTE OSTEOMYELITIS, OTHER SITE (HCC): ICD-10-CM

## 2024-08-14 DIAGNOSIS — L89.324 PRESSURE INJURY OF LEFT ISCHIUM, STAGE 4 (HCC): ICD-10-CM

## 2024-08-14 DIAGNOSIS — L89.623 PRESSURE INJURY OF LEFT HEEL, STAGE 3 (HCC): ICD-10-CM

## 2024-08-14 DIAGNOSIS — G82.53 QUADRIPLEGIA, C5-C7 COMPLETE (HCC): ICD-10-CM

## 2024-08-14 DIAGNOSIS — Z86.19 HISTORY OF INFECTION DUE TO EXTENDED SPECTRUM BETA LACTAMASE (ESBL) PRODUCING BACTERIA: ICD-10-CM

## 2024-08-14 DIAGNOSIS — T81.31XD POSTOPERATIVE WOUND DEHISCENCE, SUBSEQUENT ENCOUNTER: ICD-10-CM

## 2024-08-14 PROCEDURE — 11046 DBRDMT MUSC&/FSCA EA ADDL: CPT

## 2024-08-14 PROCEDURE — 97605 NEG PRS WND THER DME<=50SQCM: CPT

## 2024-08-14 PROCEDURE — 99213 OFFICE O/P EST LOW 20 MIN: CPT | Performed by: NURSE PRACTITIONER

## 2024-08-14 PROCEDURE — 3074F SYST BP LT 130 MM HG: CPT | Performed by: NURSE PRACTITIONER

## 2024-08-14 PROCEDURE — 11046 DBRDMT MUSC&/FSCA EA ADDL: CPT | Performed by: NURSE PRACTITIONER

## 2024-08-14 PROCEDURE — 3078F DIAST BP <80 MM HG: CPT | Performed by: NURSE PRACTITIONER

## 2024-08-14 PROCEDURE — 11043 DBRDMT MUSC&/FSCA 1ST 20/<: CPT

## 2024-08-14 PROCEDURE — 11043 DBRDMT MUSC&/FSCA 1ST 20/<: CPT | Performed by: NURSE PRACTITIONER

## 2024-08-14 PROCEDURE — 99212 OFFICE O/P EST SF 10 MIN: CPT | Performed by: NURSE PRACTITIONER

## 2024-08-14 ASSESSMENT — ENCOUNTER SYMPTOMS
DIARRHEA: 0
CONSTIPATION: 0

## 2024-08-14 ASSESSMENT — FIBROSIS 4 INDEX: FIB4 SCORE: 2.52

## 2024-08-14 NOTE — PATIENT INSTRUCTIONS
-You have a wound vac at 125 mmHg continuous pressure with black foam. The dressing will be changed every Monday, Wednesday, and Friday at the wound clinic or by your home health nurse.    -Your wound vac battery lasts approximately six hours. Charge your wound vac machine overnight and when you are at home.    -If your wound vac has a leak, you can seal the leak with additional wound vac drape/tape.    -If you are having issues with your wound vac, please consider patching leaks, changing the canister, or calling [phone] for troubleshooting. If the wound vac has been off or non-operational for over 2 hours, call wound care center to inform them and remove all dressings including black foam and replace with gauze moistened with normal saline.     -Should you experience any significant changes in your wounds, such as signs of infection (increasing redness, swelling, localized heat, increased pain, fever > 101 F, chills) or have any questions regarding your home care instructions, please contact the wound center at [phone]. If after hours, contact your primary care physician or go to the hospital emergency room.

## 2024-08-14 NOTE — PROGRESS NOTES
Provider Encounter- Pressure Injury        HISTORY OF PRESENT ILLNESS  Wound History:    START OF CARE IN CLINIC: 1/31/2024 (return to clinic after hospitalization)    REFERRING PROVIDER: Racquel York       WOUNDS-superior coccyx pressure injury, stage IV-resolved                                 Coccyx pressure injury, stage IV-resolved           Inferior coccyx pressure injury, stage IV resolved                                 Right ischial pressure injury, stage IV                                 Left heel pressure injury, recurring stage III-resolved                                 Left posterior lower leg/calf-stage III-resolved                                 Left medial lower leg surgical wound dehiscence-resolved, reopens frequently-resolved            Left ischial pressure injury, stage IV-first observed in clinic 5/1/2024          HISTORY: Patient with history of incomplete quadriplegia referred to [facility] for treatment of a stage IV pressure injury.  He has a history of previous pressure injuries to this area, and underwent muscle flaps in 2019, and then again in 2020.  He was seen in the wound clinic in November 2021 for an ulcer proximal from his current ulcer, and pressure injuries to his left posterior lower leg and left heel.  At that time, it was discovered that the patient had retained VAC foam embedded in the wound bed of the sacral wound.  Attempts were made to get him back to his plastic surgeon, though unsuccessful.  In January he underwent surgical removal of VAC sponge along with excisional debridement of his sacral wound by Dr. Chaves.  After the surgery, his wound went on to heal without incident.   In early April 2022, his home health nurse noted a new sacral ulcer, below the previous ulcer which quickly tripled in size over the following weeks.  The ulcer to his left medial lower leg had also deteriorated, with bone visible at the base..  He was hospitalized from 4/22 until 4/27/2022 and  underwent surgery with Dr. Raman on 4/26 for irrigation and debridement of multiple compartments of the left lower extremity, bone excision, and complex closure of chronic wound using biologic skin substitute.   His sacrococcygeal wound was not surgically addressed during this admission.  He was discharged back to his group home, with home health, and referral to outpatient wound clinic for his sacral wound.  He was instructed to follow-up with his surgeon for his lower leg wound.       Postoperatively, the left medial lower leg incision dehisced.  He was seen by his surgeon at [facility] on 5/11.  The surgeon opted to leave remaining sutures in place, and refer him to the wound clinic for treatment of this wound.   Treatment of this wound was initiated in clinic on 5/12.  During this visit was also noted that his heel DTI had resolved, but that he had a new pressure injury to his left posterior lower extremity.     A new pressure injury was noted to patient's right upper buttock/lower back on 5/20/2022.  Wound was linear in shape, skin discolored but intact.     Abrasion noted to left anterior lower leg.  First observed in clinic on 7/22/2022.  Patient states he bumped his leg into his food tray.     Small DTI noted to patient's left lateral lower leg on 7/29/2022.  Skin intact but discolored.     Large area of deep tissue injury noted to patient's left exterior lower leg.  Patient denied any trauma to this area.  Skin intact.  Wound documented.    1/27/2023: Patient was admitted to [facility] from 1/23-1/25/2023 with gross hematuria. He underwent RICHARD which showed watchman device was in place and he was taken off of Xarelto. While hospitalized wound team was consulted. He was referred back to [facility] and home health upon discharge.    Patient was hospitalized at [facility] for pyelonephritis from 2/26 until 3/2/2023, admitted for fever and general malaise.  He was admitted and initially started on linezolid and meropenem for suspected  UTI and history of multidrug-resistant organisms.  Urine cultures were negative. ID was consulted, recommended CT of chest and abdomen,which were negative for acute findings. However, he was treated with 5 days total course of antibiotics for suspected UTI, and symptoms completely resolved.  During this admission, the inpatient wound team was consulted for treatment of his sacral and lower leg wounds.  A wound culture was taken from his left heel pressure ulcer, negative.  Once stabilized, he was discharged home and referred back to [facility] to resume treatment of his wounds.    Patient was hospitalized at [facility] from 12/11 until 12/23/2023, admitted for fever.  Wound infection suspected.  CT scan of abdomen and pelvis for evaluation of sacral pressure injury showed gas tracking down to the bone consistent with osteomyelitis.  He underwent I&D of right ischial ulcer (documented as buttock) with Dr. Bansal, medial tract leading to an abscess was identified.  Cavity was opened allowing it to drain into the main wound bed.  Wound VAC was placed and managed by wound team during this admission.  A bone scan of patient's left foot was also done, initially concerning for osteomyelitis.  Orthopedic surgery was consulted and did not recommend surgical intervention. ID consulted also, recommended the patient to receive IV ertapenem 1 g every 24 hours plus IV daptomycin 8 mg/kg every 24 hours through 1/22/2024.   He was discharged to [facility] on 1/23 for IV antibiotics and wound care.  From the LTAC he was discharged home on 1/22 with home health and referral back to [facility] to resume management of his wounds  .      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  Never used smokeless tobacco    Patient's problem list, allergies, and current medications reviewed and updated in Epic    Interval History:  Interval History thinned 7/29/2022.  Please see previous notes for complete interval history.   Interval History thinned 1/27/2023. Please see previous note for complete interval history.  Interval History thinned 3/3/2023.  Please see previous notes for complete interval history.    Interval History thinned 8/4/2023.  Please see previous notes for complete interval history.    Interval History thinned 1/31/2024.  Please see previous notes for complete interval history.    Interval History thinned 6/26/2024.  Please see previous notes for complete interval history.      6/19/2024: Clinic visit with Steve Hodges MD. Patient denies any fevers or chills. Reports he is tolerating Abx. He has appointment with ID later today to discuss OM treatment. He is tolerating wound VAC. Slight bone exposed bilaterally in ischial wounds, slightly worse.    6/26/2024 : Clinic visit with ADILSON Arthur, FNPEGGY-BC, CWDINESHN, CFTRACI.   Patient presents today with significant deterioration of his both ischial wounds, with increased depth of undermining.   He now has a PICC line, and will be starting outpatient infusions of ertapenem later today.  He states he is feeling well, denies fevers, chills, nausea, vomiting, cough or shortness of breath.  Due to deterioration of his wound, we did discuss possibly having him go over the hospital for surgical debridement and IV antibiotics.  He was hesitant to do so.  We agreed to see how he looks after a week or so IV antibiotics.  He is agreeable to minimizing time up in his wheelchair.  He also agrees to go to the emergency room immediately if he develops any signs or symptoms of infection.    7/10/2024: Clinic visit with Steve Hodges MD. Patient reports feeling in normal state of health. He missed  last appointment as he had fall out of wheelchair and was evaluated in ED. He denies any injuries from fall. Patient wounds are measuring slightly smaller. He continues on Ertapenem. Patient reports that he has appointment for seating evaluation from [facility] scheduled, but does not recall the date.    7/17/2024 : Clinic visit with ADILSON Arthur, HOLDEN, LILIYA VALERIO.   Anjum states he is feeling well.  Left ischial wound measures significantly smaller today, however measurements vary somewhat depending on pt's position.  Both wounds appear to be progressing slightly, with improving tissue quality.    7/24/2024 : Clinic visit with ADILSON Arthur, HOLDEN, LILIYA VALERIO.   Patient continues to feel well, offers no complaints.  Right ischial wound measures smaller, left ischial wound is larger.  Patient tolerating VAC without any difficulty.  Home health continues to see him in between clinic visits.  He is still receiving daily infusions of IV antibiotics, will be completing these in August.    7/31/2024 : Clinic visit with ADILSON Arthur, HOLDEN, IMAN, LILIYA.   Anjum continues to feel well, his wounds are slowly progressing.  Tolerating VAC.  He is on IV antibiotics for 1 more week.  Admits that he is up in his wheelchair for 10 to 14 hours/day.    8/7/2024 : Clinic visit with ADILSON Arthur FNP-BC, LILIYA VALERIO.   Anjum states he is feeling well today, offers no complaints.  Both wounds measure a bit smaller today, new granulation tissue noted.  He will be getting his last IV antibiotic infusion today.    8/14/2024 : Clinic visit with ADILSON Arthur FNP-BC, IMAN, LILIYA.   Patient continues to feel well.  He has completed his antibiotics, followed up with ID earlier this week, no further antibiotic therapy at this time.  Ischial wounds are slowly progressing.  Lower extremity wounds remain resolved.    REVIEW OF SYSTEMS:   Unchanged from previous wound clinic assessment on 8/7/2024, except as noted in  interval history above.      PHYSICAL EXAMINATION:   /76   Pulse 60   Temp 36.6 °C (97.8 °F) (Temporal)   Resp 16   Physical Exam  Constitutional:       Appearance: He is obese.   Cardiovascular:      Rate and Rhythm: Normal rate.   Pulmonary:      Effort: Pulmonary effort is normal.   Abdominal:      Comments: Colostomy left lower quadrant   Genitourinary:     Comments: Suprapubic catheter to down drain   Skin:     Comments: Stage IV pressure injury to right ischium-wound area and depth have not changed significantly.  Thin layer of slough to wound bed.  Moderate serosanguineous drainage, no odor.  No evidence of infection.    Stage IV pressure injury of left ischium-wound area about the same, depth has decreased slightly.  Thin layer of slough to wound bed.  Moderate serosanguineous drainage, no odor.  No evidence of infection      All other wounds remain healed, high risk for recurrence     Neurological:      Mental Status: He is alert and oriented to person, place, and time.   Psychiatric:         Mood and Affect: Mood normal.         WOUND ASSESSMENT  Wound 12/12/23 Pressure Injury Ischium Right (Active)   Wound Image    08/14/24 1545   Site Assessment Red;Yellow 08/14/24 1545   Periwound Assessment Scar tissue 08/14/24 1545   Margins Unattached edges 08/14/24 1545   Drainage Amount Large 08/14/24 1545   Drainage Description Serosanguineous 08/14/24 1545   Treatments Cleansed;Provider debridement 08/14/24 1545   Offloading/DME Other (comment) 03/27/24 1625   Wound Cleansing Hypochlorus Acid 08/14/24 1545   Periwound Protectant No-sting Skin Prep;Hydrocolloid;Drape 08/14/24 1545   Dressing Changed Changed 08/14/24 1545   Dressing Cleansing/Solutions Not Applicable 08/14/24 1545   Dressing Options Wound Vac;Offloading Dressing - Sacral 08/14/24 1545   Dressing Change/Treatment Frequency Monday, Wednesday, Friday, and As Needed 08/14/24 1545   Wound Team Following Weekly 06/05/24 1300   WOUND NURSE ONLY -  Pressure Injury Stage 4 08/14/24 1545   Non-staged Wound Description Not applicable 05/08/24 1500   Wound Length (cm) 3 cm 08/14/24 1545   Wound Width (cm) 2 cm 08/14/24 1545   Wound Depth (cm) 1.4 cm 08/14/24 1545   Wound Surface Area (cm^2) 6 cm^2 08/14/24 1545   Wound Volume (cm^3) 8.4 cm^3 08/14/24 1545   Post-Procedure Length (cm) 3.2 cm 08/14/24 1545   Post-Procedure Width (cm) 2 cm 08/14/24 1545   Post-Procedure Depth (cm) 1.4 cm 08/14/24 1545   Post-Procedure Surface Area (cm^2) 6.4 cm^2 08/14/24 1545   Post-Procedure Volume (cm^3) 8.96 cm^3 08/14/24 1545   Wound Healing % 85 08/14/24 1545   Tunneling (cm) 0 cm 08/14/24 1545   Tunneling Clock Position of Wound 2 07/10/24 1615   Undermining (cm) 1.2 cm 08/14/24 1545   Undermining of Wound, 1st Location From 4 o'clock;To 9 o'clock 08/14/24 1545   Undermining (cm) - 2nd location 3.2 cm 08/14/24 1545   Undermining of Wound, 2nd Location From 12 o'clock;To 2 o'clock 08/14/24 1545   Wound Odor None 08/14/24 1545   Exposed Structures Fascia;KEN 08/14/24 1545   Number of days: 246       Wound 02/16/24 Full Thickness Wound Leg Medial Left (Active)   Wound Image   08/14/24 1545   Site Assessment Epithelialization 08/14/24 1545   Periwound Assessment Scar tissue;Edema 08/14/24 1545   Margins Attached edges 05/29/24 1600   Drainage Amount None 08/14/24 1545   Drainage Description Serosanguineous 06/05/24 1300   Treatments Cleansed 08/14/24 1545   Wound Cleansing Foam Cleanser/Washcloth 08/14/24 1545   Periwound Protectant Not Applicable 08/14/24 1545   Dressing Cleansing/Solutions Not Applicable 08/14/24 1545   Dressing Options Offloading Dressing - Sacral;Offloading Dressing - Heel;Tubigrip 08/14/24 1545   Dressing Change/Treatment Frequency Monday, Wednesday, Friday, and As Needed 08/14/24 1545   Wound Team Following Weekly 06/05/24 1300   Non-staged Wound Description Not applicable 08/14/24 1545   Wound Length (cm) 0 cm 08/14/24 1545   Wound Width (cm) 0 cm 08/14/24  1545   Wound Depth (cm) 0 cm 08/14/24 1545   Wound Surface Area (cm^2) 0 cm^2 08/14/24 1545   Wound Volume (cm^3) 0 cm^3 08/14/24 1545   Post-Procedure Length (cm) 0 cm 08/14/24 1545   Post-Procedure Width (cm) 0 cm 08/14/24 1545   Post-Procedure Depth (cm) 0 cm 08/14/24 1545   Post-Procedure Surface Area (cm^2) 0 cm^2 08/14/24 1545   Post-Procedure Volume (cm^3) 0 cm^3 08/14/24 1545   Wound Healing % 100 08/14/24 1545   Tunneling (cm) 0 cm 08/14/24 1545   Undermining (cm) 0 cm 08/14/24 1545   Wound Odor None 08/07/24 1400   Exposed Structures None 08/14/24 1545   Number of days: 180       Wound 05/01/24 Pressure Injury Ischium Left (Active)   Wound Image    08/14/24 1545   Site Assessment Red;Yellow 08/14/24 1545   Periwound Assessment Scar tissue 08/14/24 1545   Margins Unattached edges 08/14/24 1545   Closure Secondary intention 05/08/24 1500   Drainage Amount Large 08/14/24 1545   Drainage Description Serosanguineous 08/14/24 1545   Treatments Cleansed;Provider debridement 08/14/24 1545   Wound Cleansing Hypochlorus Acid 08/14/24 1545   Periwound Protectant No-sting Skin Prep;Hydrocolloid;Drape 08/14/24 1545   Dressing Status Intact 05/01/24 1600   Dressing Changed Changed 08/14/24 1545   Dressing Cleansing/Solutions Not Applicable 08/14/24 1545   Dressing Options Wound Vac;Offloading Dressing - Sacral 08/14/24 1545   Dressing Change/Treatment Frequency Monday, Wednesday, Friday, and As Needed 08/14/24 1545   Wound Team Following Weekly 06/05/24 1300   WOUND NURSE ONLY - Pressure Injury Stage 4 08/14/24 1545   Non-staged Wound Description Full thickness 06/05/24 1300   Wound Length (cm) 6 cm 08/14/24 1545   Wound Width (cm) 2.5 cm 08/14/24 1545   Wound Depth (cm) 3 cm 08/14/24 1545   Wound Surface Area (cm^2) 15 cm^2 08/14/24 1545   Wound Volume (cm^3) 45 cm^3 08/14/24 1545   Post-Procedure Length (cm) 6 cm 08/14/24 1545   Post-Procedure Width (cm) 2.7 cm 08/14/24 1545   Post-Procedure Depth (cm) 3 cm 08/14/24  "1545   Post-Procedure Surface Area (cm^2) 16.2 cm^2 08/14/24 1545   Post-Procedure Volume (cm^3) 48.6 cm^3 08/14/24 1545   Wound Healing % -20355 08/14/24 1545   Tunneling (cm) 0 cm 08/14/24 1545   Undermining (cm) 3 cm 08/14/24 1545   Undermining of Wound, 1st Location From 10 o'clock;To 3 o'clock 08/14/24 1545   Wound Odor None 08/07/24 1400   Exposed Structures Adipose;Fascia;Bone 08/14/24 1545   Number of days: 105       PROCEDURE: Excisional debridement of right and left ischial wounds  -2% viscous lidocaine applied topically to wound beds for approximately 5 minutes prior to debridement  -Curette used to debride wound beds.  Excisional debridement was performed to remove devitalized tissue until healthy, bleeding tissue was visualized.   Total area debrided was 22.6 cm².  Tissue debrided into the muscle/fascia layer.    -Bleeding controlled with manual pressure.    -Wound care completed by wound RN, refer to flowsheet  -Patient tolerated the procedure well, without c/o pain or discomfort.    Pertinent Labs and Diagnostics:    Labs:     A1c: No results found for: \"HBA1C\"     [facility] results, 7/1/2022 (under media tab)    CRP 13    ESR 31      IMAGING:     X-ray left tib-fib ordered 2/16/2024 through [facility]    12/11/2023-CT of abdomen pelvis with contrast  IMPRESSION:   1.  Right ischial decubitus ulcer extending to bone with soft tissue gas tracking along the right perineum. Appearance suggesting osteomyelitis, consider component of necrotizing fasciitis as clinically appropriate.  2.  Small pericardial effusion  3.  Left adrenal nodule, density on prior noncontrast CT demonstrates adenoma.  4.  Hepatomegaly  5.  Enlarged prostate, workup and evaluation for causes of prostate enlargement recommended as clinically appropriate.  6.  Atherosclerosis and atherosclerotic coronary artery disease    12/15/2023-bone scan of left foot  IMPRESSION:     1.  Mild increased activity in the LEFT 1st and 3rd " toes on blood pool and delayed images possibly indicating inflammation/infection.  2.  No significant blood flow asymmetry.          VASCULAR STUDIES: No results found.    LAST  WOUND CULTURE:   Lab Results   Component Value Date/Time    CULTRSULT Moderate growth mixed enteric ade. (A) 06/05/2024 01:40 PM    CULTRSULT Bacteroides ovatus group  Moderate growth   (A) 06/05/2024 01:40 PM    CULTRSULT Beta Streptococcus Group C  Moderate growth   (A) 06/05/2024 01:40 PM      PATHOLOGY  2/17/2023-bone fragment extracted from left lower extremity wound\\  FINAL DIAGNOSIS:     A. Left leg bone fragment at base of chronic wound:          Extensively degenerated fibrocartilaginous tissue with a rim of           fibrinopurulent debris          Correlate with culture findings            Comment: While no residual intact bone is identified, these           findings are suggestive of adjacent osteomyelitis.      ASSESSMENT AND PLAN:     1. Sacral decubitus ulcer, stage IV (HCC)  Comments: Ulcer first noted in early April 2022 as small open area which quickly enlarged.  This ulcer is present distal from previous sacral ulcer which healed after surgery in January.  Patient has history of flap reconstruction x2 to this area.    8/14/2024: Resolved, high risk for recurrence  -Patient does spend a lot of time up in his wheelchair, and wishes to continue to do so for his quality of life.  He lives independently, drives, and is involved with family activities.  He does have a roho wheelchair cushion, suspect may be inadequate. Has been referred to [facility] [place] for repeat seating evaluation.  Evaluation is scheduled for sometime in September  - Known OM that was previously treated. CT scan done during recent hospitalization did not show OM of sacrum, however OM of the ischium noted.  -Monitor site each clinic visit    Wound care: Silicone foam pressure relieving dressing    2. Pressure injury of right ischium, stage 4 (HCC)  Comments:  Abscess and OM found on CT during hospitalization in December 2023.  Patient underwent I&D with VAC placement.  IV antibiotics through 1/22/2024.    8/14/2024: Area and depth about the same.  As previously noted, measurements may vary based on patient's position.  New granulation tissue is noted.  Scattered areas of slough  - Excisional debridement of nonviable tissue from wound in clinic today, medically necessary to promote wound healing  -Home health has been instructed to continue Dakins soaked gauze to wound for prior to placement of new VAC dressing as needed for odor / slough.  -Continue wound VAC to right ischial wound  -Patient to return to clinic weekly for assessment and debridement  -Home health to change dressing 2 times per week in between clinic visits.  Dakin's soaks for 15 minutes each dressing change  -Patient is very well versed on pressure relief measures, has adequate surface on bed, alternating low air loss.  -Seating eval ordered through [employer] has been scheduled, he does not recall when.    Wound care:  NPWT at -125 mmHg to accelerate granulation, change 3 times per week, sacral offloading foam.    3. Pressure injury of left ischium, stage 4 ([facility])    8/14/2024: Area about the same, depth slightly decreased. As previously noted, measurements may vary based on patient's position.  New granulation tissue is noted.  Scattered areas of slough  - Excisional debridement of wound in clinic today, medically necessary to promote wound healing.  - Pathology on bone fragment removed on previous visit was positive for acute OM  - Continue wound VAC  - Patient to return to clinic weekly for assessment and debridement  - Continue NPWT    Wound care: NPWT at -125 mmHg to accelerate granulation, change 3 times per week, sacral offloading foam.    4. Other acute osteomyelitis, other site ([facility])    8/14/2024: Bone fragments removed on a previous visit were sent for pathology, polymicrobial, most concerning  was an MDR ESBL Proteus.   -Patient completed IV antibiotics, and PICC line removed last week  -Patient understands he has a very low threshold for infection/sepsis.  He understands he is to go to the emergency room immediately if he begins to experience fevers, chills, nausea or vomiting, or if he notices radiating erythema or purulent drainage from his wounds.  -Follow-up with ID later today    5. Postoperative wound dehiscence, subsequent encounter  6. Osteomyelitis of left lower extremity ([facility])  Comments: On 4/26/2022 patient underwent irrigation and debridement of multiple compartments of the left lower extremity states for pressure injury, with bone excision, and complex closure of chronic wound using biologic skin substitute.  During postop visit in surgeons office on 5/11, surgical site was noted to be dehisced.      8/14/2024: Remains resolved, though covered with fragile epithelium.  No drainage today..  Historically has waxed and waned over the course of treatment.  Known osteomyelitis.  Patient has declined amputation  -No excisional debridement of these wounds required today  -Patient to be mindful of offloading when supine, should assure that his leg is not rotating medially  -Monitor site each clinic visit  Wound care: Silicone foam dressing, Tubigrip D    7. Pressure injury of left foot, stage 4 ([facility])  8. Pressure injury of left leg, stage 3 ([facility])  9. Pressure injury of left heel, stage 3 ([facility])    8/14/2024: All of these wounds remain resolved  - High risk for reoccurrence  - Continue to monitor  - Patient aware of offloading.    10. Quadriplegia, C5-C7 incomplete ([facility])  Comments: Complicating factor.  Impaired mobility and sensation  -Patient is still spending 7-8 hours/day up in his wheelchair, he has Roho cushion nearly 5 years old, and knows to reposition frequently.  Wears heel float boots bilaterally at all times  - Patient reports that he has seating evaluation with [facility]  upcoming.    Please note that this note may have been created using voice recognition software. I have worked with technical experts from [facility] to optimize the interface.  I have made every reasonable attempt to correct obvious errors, but there may be errors of grammar and possibly content that I did not discover before finalizing the note.

## 2024-08-14 NOTE — PROGRESS NOTES
RN Wound Care Procedures 8/14/2024:  Wound vac reapplied to left and right ischium wounds using three strips of black simplace granufoam (One strip into each wound, third strip used to bridge left ischium wound vac foam to the right flank). Negative pressure wound therapy resumed at 125 mmHg continuous pressure, no leaks noted.    Updated wound care order routed to [facility] via [facility].

## 2024-08-15 LAB
DOPAMINE SERPL-MCNC: 131 PMOL/L
EPINEPH PLAS-MCNC: 111 PMOL/L
NOREPINEPH PLAS-MCNC: 2761 PMOL/L (ref 1050–4800)

## 2024-08-21 ENCOUNTER — OFFICE VISIT (OUTPATIENT)
Dept: WOUND CARE | Facility: MEDICAL CENTER | Age: 74
End: 2024-08-21
Payer: MEDICARE

## 2024-08-21 VITALS
DIASTOLIC BLOOD PRESSURE: 68 MMHG | SYSTOLIC BLOOD PRESSURE: 118 MMHG | TEMPERATURE: 97.7 F | RESPIRATION RATE: 16 BRPM | HEART RATE: 77 BPM

## 2024-08-21 DIAGNOSIS — G82.54 QUADRIPLEGIA, C5-C7, INCOMPLETE (HCC): ICD-10-CM

## 2024-08-21 DIAGNOSIS — L89.893 PRESSURE INJURY OF LEFT LEG, STAGE 3 (HCC): ICD-10-CM

## 2024-08-21 DIAGNOSIS — M86.18 OTHER ACUTE OSTEOMYELITIS, OTHER SITE (HCC): ICD-10-CM

## 2024-08-21 DIAGNOSIS — L89.154 SACRAL DECUBITUS ULCER, STAGE IV (HCC): ICD-10-CM

## 2024-08-21 DIAGNOSIS — L89.894 PRESSURE INJURY OF LEFT FOOT, STAGE 4 (HCC): ICD-10-CM

## 2024-08-21 DIAGNOSIS — L89.314 PRESSURE INJURY OF RIGHT ISCHIUM, STAGE 4 (HCC): ICD-10-CM

## 2024-08-21 DIAGNOSIS — T81.31XD POSTOPERATIVE WOUND DEHISCENCE, SUBSEQUENT ENCOUNTER: ICD-10-CM

## 2024-08-21 DIAGNOSIS — L89.324 PRESSURE INJURY OF LEFT ISCHIUM, STAGE 4 (HCC): ICD-10-CM

## 2024-08-21 DIAGNOSIS — L89.623 PRESSURE INJURY OF LEFT HEEL, STAGE 3 (HCC): ICD-10-CM

## 2024-08-21 DIAGNOSIS — M86.9 OSTEOMYELITIS OF LEFT LOWER EXTREMITY (HCC): ICD-10-CM

## 2024-08-21 PROCEDURE — 11043 DBRDMT MUSC&/FSCA 1ST 20/<: CPT

## 2024-08-21 NOTE — PATIENT INSTRUCTIONS
-Keep your wound dressing clean, dry, and intact.     -ISCHIUM WOUNDS: Wound vac may not have any drainage in tube or cannister & it will still be working.   Change cannister if it does become full by pressing tab on side of machine to remove canister and snap on new one. The full canister can be thrown in the trash. If the cannister fills with bright red blood - go to ER. Dressing will be changed every MWF at the wound clinic, or in combination with home health if applicable.  If you are having issues with your wound VAC, please consider patching leaks, changing the canister, or calling [phone] for troubleshooting. If the wound VAC has been off or un-operational for over 2 hours, notify the wound clinic (or home health if you have it) and remove all dressings including black foam and replace with normal saline damp gauze.     - LEFT LEG WOUND: Resolved wound will be fragile for a few days. Bathe and dry area gently. The wound site only ever regains a maximum of 80% of the tensile strength of the surrounding skin, remodeling of scars can continue for 6 months to a year. Contact your primary care provider for a new referral if there are any problems with the area opening and draining again.    -Should you experience any significant changes in your wound(s), such as signs of infection (increasing redness, swelling, localized heat, increased pain, fever > 101 F, chills) or have any questions regarding your home care instructions, please contact the wound center at [phone]. If after hours, contact your primary care physician or go to the hospital emergency room.     If you are admitted to any hospital, you will need a new referral to come back to the wound clinic and any scheduled appointments that you already have may be cancelled.     -If you are 5 or more minutes late for an appointment, we reserve the right to cancel and reschedule that appointment. Additionally, if you are habitually late or not  attending appts (3 late cancellations and/or no shows), we reserve the right to cancel your remaining appointments and it will be your responsibility to obtain a new referral if services are still needed.

## 2024-08-22 NOTE — PROGRESS NOTES
Provider Encounter- Pressure Injury        HISTORY OF PRESENT ILLNESS  Wound History:    START OF CARE IN CLINIC: 1/31/2024 (return to clinic after hospitalization)    REFERRING PROVIDER: Racquel York       WOUNDS-superior coccyx pressure injury, stage IV-resolved                                 Coccyx pressure injury, stage IV-resolved           Inferior coccyx pressure injury, stage IV resolved                                 Right ischial pressure injury, stage IV                                 Left heel pressure injury, recurring stage III-resolved                                 Left posterior lower leg/calf-stage III-resolved                                 Left medial lower leg surgical wound dehiscence-resolved, reopens frequently-resolved            Left ischial pressure injury, stage IV-first observed in clinic 5/1/2024          HISTORY: Patient with history of incomplete quadriplegia referred to [facility] for treatment of a stage IV pressure injury.  He has a history of previous pressure injuries to this area, and underwent muscle flaps in 2019, and then again in 2020.  He was seen in the wound clinic in November 2021 for an ulcer proximal from his current ulcer, and pressure injuries to his left posterior lower leg and left heel.  At that time, it was discovered that the patient had retained VAC foam embedded in the wound bed of the sacral wound.  Attempts were made to get him back to his plastic surgeon, though unsuccessful.  In January he underwent surgical removal of VAC sponge along with excisional debridement of his sacral wound by Dr. Chaves.  After the surgery, his wound went on to heal without incident.   In early April 2022, his home health nurse noted a new sacral ulcer, below the previous ulcer which quickly tripled in size over the following weeks.  The ulcer to his left medial lower leg had also deteriorated, with bone visible at the base..  He was hospitalized from 4/22 until 4/27/2022 and  underwent surgery with Dr. Raman on 4/26 for irrigation and debridement of multiple compartments of the left lower extremity, bone excision, and complex closure of chronic wound using biologic skin substitute.   His sacrococcygeal wound was not surgically addressed during this admission.  He was discharged back to his group home, with home health, and referral to outpatient wound clinic for his sacral wound.  He was instructed to follow-up with his surgeon for his lower leg wound.       Postoperatively, the left medial lower leg incision dehisced.  He was seen by his surgeon at [facility] on 5/11.  The surgeon opted to leave remaining sutures in place, and refer him to the wound clinic for treatment of this wound.   Treatment of this wound was initiated in clinic on 5/12.  During this visit was also noted that his heel DTI had resolved, but that he had a new pressure injury to his left posterior lower extremity.     A new pressure injury was noted to patient's right upper buttock/lower back on 5/20/2022.  Wound was linear in shape, skin discolored but intact.     Abrasion noted to left anterior lower leg.  First observed in clinic on 7/22/2022.  Patient states he bumped his leg into his food tray.     Small DTI noted to patient's left lateral lower leg on 7/29/2022.  Skin intact but discolored.     Large area of deep tissue injury noted to patient's left exterior lower leg.  Patient denied any trauma to this area.  Skin intact.  Wound documented.    1/27/2023: Patient was admitted to [facility] from 1/23-1/25/2023 with gross hematuria. He underwent RICHARD which showed watchman device was in place and he was taken off of Xarelto. While hospitalized wound team was consulted. He was referred back to [facility] and home health upon discharge.    Patient was hospitalized at [facility] for pyelonephritis from 2/26 until 3/2/2023, admitted for fever and general malaise.  He was admitted and initially started on linezolid and meropenem for suspected  UTI and history of multidrug-resistant organisms.  Urine cultures were negative. ID was consulted, recommended CT of chest and abdomen,which were negative for acute findings. However, he was treated with 5 days total course of antibiotics for suspected UTI, and symptoms completely resolved.  During this admission, the inpatient wound team was consulted for treatment of his sacral and lower leg wounds.  A wound culture was taken from his left heel pressure ulcer, negative.  Once stabilized, he was discharged home and referred back to [facility] to resume treatment of his wounds.    Patient was hospitalized at [facility] from 12/11 until 12/23/2023, admitted for fever.  Wound infection suspected.  CT scan of abdomen and pelvis for evaluation of sacral pressure injury showed gas tracking down to the bone consistent with osteomyelitis.  He underwent I&D of right ischial ulcer (documented as buttock) with Dr. Bansal, medial tract leading to an abscess was identified.  Cavity was opened allowing it to drain into the main wound bed.  Wound VAC was placed and managed by wound team during this admission.  A bone scan of patient's left foot was also done, initially concerning for osteomyelitis.  Orthopedic surgery was consulted and did not recommend surgical intervention. ID consulted also, recommended the patient to receive IV ertapenem 1 g every 24 hours plus IV daptomycin 8 mg/kg every 24 hours through 1/22/2024.   He was discharged to [facility] on 1/23 for IV antibiotics and wound care.  From the LTAC he was discharged home on 1/22 with home health and referral back to [facility] to resume management of his wounds  .      Pertinent Medical History: Incomplete quadriplegia, history of stage IV pressure injuries, history of flap procedures to sacral pressure injuries, osteomyelitis, obesity, colostomy in place   Contributing factors: Immobility and Obesity, impaired sensation    Personal support: Attendant-staff at nursing home and home health  nursing    TOBACCO USE:   Former smoker, quit in 1977.  Never used smokeless tobacco    Patient's problem list, allergies, and current medications reviewed and updated in Epic    Interval History:  Interval History thinned 7/29/2022.  Please see previous notes for complete interval history.   Interval History thinned 1/27/2023. Please see previous note for complete interval history.  Interval History thinned 3/3/2023.  Please see previous notes for complete interval history.    Interval History thinned 8/4/2023.  Please see previous notes for complete interval history.    Interval History thinned 1/31/2024.  Please see previous notes for complete interval history.    Interval History thinned 6/26/2024.  Please see previous notes for complete interval history.      6/19/2024: Clinic visit with Steve Hodges MD. Patient denies any fevers or chills. Reports he is tolerating Abx. He has appointment with ID later today to discuss OM treatment. He is tolerating wound VAC. Slight bone exposed bilaterally in ischial wounds, slightly worse.    6/26/2024 : Clinic visit with ADILSON Arthur, FNPEGGY-BC, CWDINESHN, CFTRACI.   Patient presents today with significant deterioration of his both ischial wounds, with increased depth of undermining.   He now has a PICC line, and will be starting outpatient infusions of ertapenem later today.  He states he is feeling well, denies fevers, chills, nausea, vomiting, cough or shortness of breath.  Due to deterioration of his wound, we did discuss possibly having him go over the hospital for surgical debridement and IV antibiotics.  He was hesitant to do so.  We agreed to see how he looks after a week or so IV antibiotics.  He is agreeable to minimizing time up in his wheelchair.  He also agrees to go to the emergency room immediately if he develops any signs or symptoms of infection.    7/10/2024: Clinic visit with Steve Hodges MD. Patient reports feeling in normal state of health. He missed  last appointment as he had fall out of wheelchair and was evaluated in ED. He denies any injuries from fall. Patient wounds are measuring slightly smaller. He continues on Ertapenem. Patient reports that he has appointment for seating evaluation from [facility] scheduled, but does not recall the date.    7/17/2024 : Clinic visit with ADILSON Arthur, HOLDEN, LILIYA VALERIO.   Anjum states he is feeling well.  Left ischial wound measures significantly smaller today, however measurements vary somewhat depending on pt's position.  Both wounds appear to be progressing slightly, with improving tissue quality.    7/24/2024 : Clinic visit with ADILSON Arthur, HOLDEN, IMAN, LILIYA.   Patient continues to feel well, offers no complaints.  Right ischial wound measures smaller, left ischial wound is larger.  Patient tolerating VAC without any difficulty.  Home health continues to see him in between clinic visits.  He is still receiving daily infusions of IV antibiotics, will be completing these in August.    7/31/2024 : Clinic visit with ADILSON Arthur, HOLDEN, IMAN, LILIYA.   Anjum continues to feel well, his wounds are slowly progressing.  Tolerating VAC.  He is on IV antibiotics for 1 more week.  Admits that he is up in his wheelchair for 10 to 14 hours/day.    8/7/2024 : Clinic visit with ADILSON Arthur FNP-BC, LILIYA VALERIO.   Anjum states he is feeling well today, offers no complaints.  Both wounds measure a bit smaller today, new granulation tissue noted.  He will be getting his last IV antibiotic infusion today.    8/14/2024 : Clinic visit with ADILSON Arthur FNP-BC, IMAN, LILIYA.   Patient continues to feel well.  He has completed his antibiotics, followed up with ID earlier this week, no further antibiotic therapy at this time.  Ischial wounds are slowly progressing.  Lower extremity wounds remain resolved.    8/21/2024 : Clinic visit with ADILSON Arthur FNP-BC, LILIYA VALERIO.   Anjum states he is feeling  well, offers no complaints.  His wounds are slowly progressing, increased granulation.    REVIEW OF SYSTEMS:   Unchanged from previous wound clinic assessment on 8/22/2024, except as noted in interval history above.      PHYSICAL EXAMINATION:   /68   Pulse 77   Temp 36.5 °C (97.7 °F) (Temporal)   Resp 16   Physical Exam  Constitutional:       Appearance: He is obese.   Cardiovascular:      Rate and Rhythm: Normal rate.   Pulmonary:      Effort: Pulmonary effort is normal.   Abdominal:      Comments: Colostomy left lower quadrant   Genitourinary:     Comments: Suprapubic catheter to down drain   Skin:     Comments: Stage IV pressure injury to right ischium: Wound measures s smaller, though dimensions tend to be positional.  Scattered areas of new granulation.  Moderate serosanguineous drainage, no odor.  No evidence of infection    Stage IV pressure injury of left ischium: Wound measures larger, though dimensions tend to be positional.  Scattered areas of new granulation.  Thin layer of slough to wound bed.  Moderate serosanguineous drainage, no odor.  No evidence of infection      All other wounds remain healed, high risk for recurrence     Neurological:      Mental Status: He is alert and oriented to person, place, and time.   Psychiatric:         Mood and Affect: Mood normal.         WOUND ASSESSMENT  Wound 12/12/23 Pressure Injury Ischium Right (Active)   Wound Image    08/21/24 1600   Site Assessment Yellow;Red 08/21/24 1600   Periwound Assessment Intact;Fragile;Scar tissue 08/21/24 1600   Margins Unattached edges 08/21/24 1600   Drainage Amount Large 08/21/24 1600   Drainage Description Serosanguineous 08/21/24 1600   Treatments Cleansed;Provider debridement 08/21/24 1600   Offloading/DME Other (comment) 03/27/24 1625   Wound Cleansing Hypochlorus Acid 08/21/24 1600   Periwound Protectant Skin Protectant Wipes to Periwound 08/21/24 1600   Dressing Changed Changed 08/21/24 1600   Dressing  Cleansing/Solutions Not Applicable 08/21/24 1600   Dressing Options Wound Vac;Offloading Dressing - Sacral;Other (Comments) 08/21/24 1600   Dressing Change/Treatment Frequency Monday, Wednesday, Friday, and As Needed 08/21/24 1600   Wound Team Following Weekly 06/05/24 1300   WOUND NURSE ONLY - Pressure Injury Stage 4 08/21/24 1600   Non-staged Wound Description Not applicable 05/08/24 1500   Wound Length (cm) 3 cm 08/21/24 1600   Wound Width (cm) 2.3 cm 08/21/24 1600   Wound Depth (cm) 0.7 cm 08/21/24 1600   Wound Surface Area (cm^2) 6.9 cm^2 08/21/24 1600   Wound Volume (cm^3) 4.83 cm^3 08/21/24 1600   Post-Procedure Length (cm) 3 cm 08/21/24 1600   Post-Procedure Width (cm) 2.4 cm 08/21/24 1600   Post-Procedure Depth (cm) 0.8 cm 08/21/24 1600   Post-Procedure Surface Area (cm^2) 7.2 cm^2 08/21/24 1600   Post-Procedure Volume (cm^3) 5.76 cm^3 08/21/24 1600   Wound Healing % 91 08/21/24 1600   Tunneling (cm) 0 cm 08/21/24 1600   Tunneling Clock Position of Wound 2 07/10/24 1615   Undermining (cm) 2.5 cm 08/21/24 1600   Undermining of Wound, 1st Location From 4 o'clock;To 2 o'clock 08/21/24 1600   Undermining (cm) - 2nd location 0 cm 08/21/24 1600   Undermining of Wound, 2nd Location From 12 o'clock;To 2 o'clock 08/14/24 1545   Wound Odor None 08/21/24 1600   Exposed Structures Adipose 08/21/24 1600   Number of days: 253       Wound 02/16/24 Full Thickness Wound Leg Medial Left (Active)   Wound Image   08/21/24 1600   Site Assessment Epithelialization 08/21/24 1600   Periwound Assessment Scar tissue;Edema 08/21/24 1600   Margins Attached edges 05/29/24 1600   Drainage Amount None 08/21/24 1600   Drainage Description Serosanguineous 06/05/24 1300   Treatments Cleansed 08/14/24 1545   Wound Cleansing Foam Cleanser/Washcloth 08/14/24 1545   Periwound Protectant Not Applicable 08/14/24 1545   Dressing Cleansing/Solutions Not Applicable 08/14/24 1545   Dressing Options Offloading Dressing - Heel;Tubigrip;Other (Comments)  08/21/24 1600   Dressing Change/Treatment Frequency Monday, Wednesday, Friday, and As Needed 08/14/24 1545   Wound Team Following Weekly 06/05/24 1300   Non-staged Wound Description Not applicable 08/14/24 1545   Wound Length (cm) 0 cm 08/21/24 1600   Wound Width (cm) 0 cm 08/21/24 1600   Wound Depth (cm) 0 cm 08/21/24 1600   Wound Surface Area (cm^2) 0 cm^2 08/21/24 1600   Wound Volume (cm^3) 0 cm^3 08/21/24 1600   Post-Procedure Length (cm) 0 cm 08/14/24 1545   Post-Procedure Width (cm) 0 cm 08/14/24 1545   Post-Procedure Depth (cm) 0 cm 08/14/24 1545   Post-Procedure Surface Area (cm^2) 0 cm^2 08/14/24 1545   Post-Procedure Volume (cm^3) 0 cm^3 08/14/24 1545   Wound Healing % 100 08/21/24 1600   Tunneling (cm) 0 cm 08/14/24 1545   Undermining (cm) 0 cm 08/14/24 1545   Wound Odor None 08/07/24 1400   Exposed Structures None 08/14/24 1545   Number of days: 187       Wound 05/01/24 Pressure Injury Ischium Left (Active)   Wound Image    08/21/24 1600   Site Assessment Yellow;Red;Purple 08/21/24 1600   Periwound Assessment Scar tissue;Other (Comment);Fragile 08/21/24 1600   Margins Unattached edges 08/21/24 1600   Closure Secondary intention 05/08/24 1500   Drainage Amount Large 08/21/24 1600   Drainage Description Serosanguineous 08/21/24 1600   Treatments Cleansed;Provider debridement 08/21/24 1600   Wound Cleansing Hypochlorus Acid 08/21/24 1600   Periwound Protectant Skin Protectant Wipes to Periwound 08/21/24 1600   Dressing Status Intact 05/01/24 1600   Dressing Changed Changed 08/21/24 1600   Dressing Cleansing/Solutions Not Applicable 08/21/24 1600   Dressing Options Wound Vac;Offloading Dressing - Sacral;Other (Comments) 08/21/24 1600   Dressing Change/Treatment Frequency Monday, Wednesday, Friday, and As Needed 08/21/24 1600   Wound Team Following Weekly 06/05/24 1300   WOUND NURSE ONLY - Pressure Injury Stage 4 08/21/24 1600   Non-staged Wound Description Full thickness 06/05/24 1300   Wound Length (cm) 5.5  "cm 08/21/24 1600   Wound Width (cm) 3.7 cm 08/21/24 1600   Wound Depth (cm) 2.7 cm 08/21/24 1600   Wound Surface Area (cm^2) 20.35 cm^2 08/21/24 1600   Wound Volume (cm^3) 54.945 cm^3 08/21/24 1600   Post-Procedure Length (cm) 5.5 cm 08/21/24 1600   Post-Procedure Width (cm) 3.8 cm 08/21/24 1600   Post-Procedure Depth (cm) 2.8 cm 08/21/24 1600   Post-Procedure Surface Area (cm^2) 20.9 cm^2 08/21/24 1600   Post-Procedure Volume (cm^3) 58.52 cm^3 08/21/24 1600   Wound Healing % -[id number] 08/21/24 1600   Tunneling (cm) 0 cm 08/21/24 1600   Undermining (cm) 2.7 cm 08/21/24 1600   Undermining of Wound, 1st Location From 10 o'clock;To 3 o'clock 08/21/24 1600   Wound Odor None 08/07/24 1400   Exposed Structures Adipose;Fascia 08/21/24 1600   Number of days: 112       PROCEDURE: Excisional debridement of right and left ischial wounds  -2% viscous lidocaine applied topically to wound beds for approximately 5 minutes prior to debridement  -Curette used to debride wound beds.  Excisional debridement was performed to remove devitalized tissue until healthy, bleeding tissue was visualized.   Total area debrided was 28.1 cm².  Tissue debrided into the muscle/fascia layer.    -Bleeding controlled with manual pressure.    -Wound care completed by wound RN, refer to flowsheet  -Patient tolerated the procedure well, without c/o pain or discomfort.    Pertinent Labs and Diagnostics:    Labs:     A1c: No results found for: \"HBA1C\"     [facility] results, 7/1/2022 (under media tab)    CRP 13    ESR 31      IMAGING:     X-ray left tib-fib ordered 2/16/2024 through [facility]    12/11/2023-CT of abdomen pelvis with contrast  IMPRESSION:   1.  Right ischial decubitus ulcer extending to bone with soft tissue gas tracking along the right perineum. Appearance suggesting osteomyelitis, consider component of necrotizing fasciitis as clinically appropriate.  2.  Small pericardial effusion  3.  Left adrenal nodule, density on prior " noncontrast CT demonstrates adenoma.  4.  Hepatomegaly  5.  Enlarged prostate, workup and evaluation for causes of prostate enlargement recommended as clinically appropriate.  6.  Atherosclerosis and atherosclerotic coronary artery disease    12/15/2023-bone scan of left foot  IMPRESSION:     1.  Mild increased activity in the LEFT 1st and 3rd toes on blood pool and delayed images possibly indicating inflammation/infection.  2.  No significant blood flow asymmetry.          VASCULAR STUDIES: No results found.    LAST  WOUND CULTURE:   Lab Results   Component Value Date/Time    CULTRSULT Moderate growth mixed enteric ade. (A) 06/05/2024 01:40 PM    CULTRSULT Bacteroides ovatus group  Moderate growth   (A) 06/05/2024 01:40 PM    CULTRSULT Beta Streptococcus Group C  Moderate growth   (A) 06/05/2024 01:40 PM      PATHOLOGY  2/17/2023-bone fragment extracted from left lower extremity wound\\  FINAL DIAGNOSIS:     A. Left leg bone fragment at base of chronic wound:          Extensively degenerated fibrocartilaginous tissue with a rim of           fibrinopurulent debris          Correlate with culture findings            Comment: While no residual intact bone is identified, these           findings are suggestive of adjacent osteomyelitis.      ASSESSMENT AND PLAN:     1. Sacral decubitus ulcer, stage IV (HCC)  Comments: Ulcer first noted in early April 2022 as small open area which quickly enlarged.  This ulcer is present distal from previous sacral ulcer which healed after surgery in January.  Patient has history of flap reconstruction x2 to this area.    8/21/2024: Remains resolved, high risk for recurrence  -Patient does spend a lot of time up in his wheelchair, and wishes to continue to do so for his quality of life.  He lives independently, drives, and is involved with family activities.  He does have a roho wheelchair cushion, suspect may be inadequate. Has been referred to [facility] for repeat seating evaluation.   Evaluation is scheduled for sometime in September  - Known OM that was previously treated. CT scan done during recent hospitalization did not show OM of sacrum, however OM of the ischium noted.  -Monitor site each clinic visit    Wound care: Silicone foam pressure relieving dressing    2. Pressure injury of right ischium, stage 4 (HCC)  Comments: Abscess and OM found on CT during hospitalization in December 2023.  Patient underwent I&D with VAC placement.  IV antibiotics through 1/22/2024.    8/21/2024: Wound measures smaller today.  As previously noted, measurements may vary based on patient's position.  New granulation tissue is noted.  Scattered areas of slough  - Excisional debridement of nonviable tissue from wound in clinic today, medically necessary to promote wound healing  -Home health has been instructed to continue Dakins soaked gauze to wound for prior to placement of new VAC dressing as needed for odor / slough.  -Continue wound VAC to right ischial wound  -Patient to return to clinic weekly for assessment and debridement  -Home health to change dressing 2 times per week in between clinic visits.  Dakin's soaks for 15 minutes each dressing change  -Patient is very well versed on pressure relief measures, has adequate surface on bed, alternating low air loss.  -Seating eval ordered through [employer] has been scheduled, he does not recall when.    Wound care:  NPWT at -125 mmHg to accelerate granulation, change 3 times per week, sacral offloading foam.    3. Pressure injury of left ischium, stage 4 (HCC)    8/21/2024: Wound measures larger today.  As previously noted, measurements may vary based on patient's position.  New granulation tissue is noted.  Scattered areas of slough  - Excisional debridement of wound in clinic today, medically necessary to promote wound healing.  - Continue wound VAC  - Patient to return to clinic weekly for assessment and debridement  - Continue NPWT    Wound care: NPWT at  -125 mmHg to accelerate granulation, change 3 times per week, sacral offloading foam.    4. Other acute osteomyelitis, other site ([facility])    8/21/2024: Bone fragments removed on a previous visit were sent for pathology, polymicrobial, most concerning was an MDR ESBL Proteus.   -Patient completed IV antibiotics.  No further antibiotics at this time per ID  -Patient understands he has a very low threshold for infection/sepsis.  He understands he is to go to the emergency room immediately if he begins to experience fevers, chills, nausea or vomiting, or if he notices radiating erythema or purulent drainage from his wounds.  -ID following    5. Postoperative wound dehiscence, subsequent encounter  6. Osteomyelitis of left lower extremity ([facility])  Comments: On 4/26/2022 patient underwent irrigation and debridement of multiple compartments of the left lower extremity states for pressure injury, with bone excision, and complex closure of chronic wound using biologic skin substitute.  During postop visit in surgeons office on 5/11, surgical site was noted to be dehisced.      8/21/2024: Currently resolved, though covered with fragile epithelium.  Historically has waxed and waned, opened and closed.  High risk for recurrence.  Known osteomyelitis.  Patient has declined amputation  -No excisional debridement of these wounds required today  -Patient to be mindful of offloading when supine, should assure that his leg is not rotating medially  -Monitor site each clinic visit  Wound care: Silicone foam dressing, Tubigrip D    7. Pressure injury of left foot, stage 4 ([facility])  8. Pressure injury of left leg, stage 3 ([facility])  9. Pressure injury of left heel, stage 3 ([facility])    8/21/2024: All of these wounds remain resolved  - High risk for reoccurrence  - Continue to monitor  - Patient aware of offloading.    10. Quadriplegia, C5-C7 incomplete ([facility])  Comments: Complicating factor.  Impaired mobility and sensation  -Patient is still spending  7-8 hours/day up in his wheelchair, he has Roho cushion nearly 5 years old, and knows to reposition frequently.  Wears heel float boots bilaterally at all times  - Patient reports that he has seating evaluation with [employer] upcoming.    Please note that this note may have been created using voice recognition software. I have worked with technical experts from [facility] to optimize the interface.  I have made every reasonable attempt to correct obvious errors, but there may be errors of grammar and possibly content that I did not discover before finalizing the note.

## 2024-08-28 ENCOUNTER — OFFICE VISIT (OUTPATIENT)
Dept: WOUND CARE | Facility: MEDICAL CENTER | Age: 74
End: 2024-08-28
Payer: MEDICARE

## 2024-08-28 VITALS
SYSTOLIC BLOOD PRESSURE: 108 MMHG | TEMPERATURE: 98.4 F | RESPIRATION RATE: 20 BRPM | DIASTOLIC BLOOD PRESSURE: 66 MMHG | HEART RATE: 85 BPM

## 2024-08-28 DIAGNOSIS — M86.9 OSTEOMYELITIS OF LEFT LOWER EXTREMITY (HCC): ICD-10-CM

## 2024-08-28 DIAGNOSIS — L89.154 SACRAL DECUBITUS ULCER, STAGE IV (HCC): ICD-10-CM

## 2024-08-28 DIAGNOSIS — L89.623 PRESSURE INJURY OF LEFT HEEL, STAGE 3 (HCC): ICD-10-CM

## 2024-08-28 DIAGNOSIS — M86.18 OTHER ACUTE OSTEOMYELITIS, OTHER SITE (HCC): ICD-10-CM

## 2024-08-28 DIAGNOSIS — T81.31XD POSTOPERATIVE WOUND DEHISCENCE, SUBSEQUENT ENCOUNTER: ICD-10-CM

## 2024-08-28 DIAGNOSIS — L89.893 PRESSURE INJURY OF LEFT LEG, STAGE 3 (HCC): ICD-10-CM

## 2024-08-28 DIAGNOSIS — L89.314 PRESSURE INJURY OF RIGHT ISCHIUM, STAGE 4 (HCC): ICD-10-CM

## 2024-08-28 DIAGNOSIS — L98.9 SKIN LESION: ICD-10-CM

## 2024-08-28 DIAGNOSIS — G82.54 QUADRIPLEGIA, C5-C7, INCOMPLETE (HCC): ICD-10-CM

## 2024-08-28 DIAGNOSIS — L89.894 PRESSURE INJURY OF LEFT FOOT, STAGE 4 (HCC): ICD-10-CM

## 2024-08-28 DIAGNOSIS — L89.324 PRESSURE INJURY OF LEFT ISCHIUM, STAGE 4 (HCC): ICD-10-CM

## 2024-08-28 PROCEDURE — 11043 DBRDMT MUSC&/FSCA 1ST 20/<: CPT

## 2024-08-28 PROCEDURE — 11046 DBRDMT MUSC&/FSCA EA ADDL: CPT

## 2024-08-28 NOTE — PROGRESS NOTES
Provider Encounter- Pressure Injury        HISTORY OF PRESENT ILLNESS  Wound History:    START OF CARE IN CLINIC: 1/31/2024 (return to clinic after hospitalization)    REFERRING PROVIDER: Racquel York       WOUNDS-superior coccyx pressure injury, stage IV-resolved                                 Coccyx pressure injury, stage IV-resolved           Inferior coccyx pressure injury, stage IV resolved                                 Right ischial pressure injury, stage IV                                 Left heel pressure injury, recurring stage III-resolved                                 Left posterior lower leg/calf-stage III-resolved                                 Left medial lower leg surgical wound dehiscence-resolved, reopens frequently-resolved            Left ischial pressure injury, stage IV-first observed in clinic 5/1/2024          HISTORY: Patient with history of incomplete quadriplegia referred to [facility] for treatment of a stage IV pressure injury.  He has a history of previous pressure injuries to this area, and underwent muscle flaps in 2019, and then again in 2020.  He was seen in the wound clinic in November 2021 for an ulcer proximal from his current ulcer, and pressure injuries to his left posterior lower leg and left heel.  At that time, it was discovered that the patient had retained VAC foam embedded in the wound bed of the sacral wound.  Attempts were made to get him back to his plastic surgeon, though unsuccessful.  In January he underwent surgical removal of VAC sponge along with excisional debridement of his sacral wound by Dr. Chaves.  After the surgery, his wound went on to heal without incident.   In early April 2022, his home health nurse noted a new sacral ulcer, below the previous ulcer which quickly tripled in size over the following weeks.  The ulcer to his left medial lower leg had also deteriorated, with bone visible at the base..  He was hospitalized from 4/22 until 4/27/2022 and  underwent surgery with Dr. Raman on 4/26 for irrigation and debridement of multiple compartments of the left lower extremity, bone excision, and complex closure of chronic wound using biologic skin substitute.   His sacrococcygeal wound was not surgically addressed during this admission.  He was discharged back to his group home, with home health, and referral to outpatient wound clinic for his sacral wound.  He was instructed to follow-up with his surgeon for his lower leg wound.       Postoperatively, the left medial lower leg incision dehisced.  He was seen by his surgeon at [facility] on 5/11.  The surgeon opted to leave remaining sutures in place, and refer him to the wound clinic for treatment of this wound.   Treatment of this wound was initiated in clinic on 5/12.  During this visit was also noted that his heel DTI had resolved, but that he had a new pressure injury to his left posterior lower extremity.     A new pressure injury was noted to patient's right upper buttock/lower back on 5/20/2022.  Wound was linear in shape, skin discolored but intact.     Abrasion noted to left anterior lower leg.  First observed in clinic on 7/22/2022.  Patient states he bumped his leg into his food tray.     Small DTI noted to patient's left lateral lower leg on 7/29/2022.  Skin intact but discolored.     Large area of deep tissue injury noted to patient's left exterior lower leg.  Patient denied any trauma to this area.  Skin intact.  Wound documented.    1/27/2023: Patient was admitted to [facility] from 1/23-1/25/2023 with gross hematuria. He underwent RICHARD which showed watchman device was in place and he was taken off of Xarelto. While hospitalized wound team was consulted. He was referred back to [facility] and home health upon discharge.    Patient was hospitalized at [facility] for pyelonephritis from 2/26 until 3/2/2023, admitted for fever and general malaise.  He was admitted and initially started on linezolid and meropenem for suspected  UTI and history of multidrug-resistant organisms.  Urine cultures were negative. ID was consulted, recommended CT of chest and abdomen,which were negative for acute findings. However, he was treated with 5 days total course of antibiotics for suspected UTI, and symptoms completely resolved.  During this admission, the inpatient wound team was consulted for treatment of his sacral and lower leg wounds.  A wound culture was taken from his left heel pressure ulcer, negative.  Once stabilized, he was discharged home and referred back to [facility] to resume treatment of his wounds.    Patient was hospitalized at [facility] from 12/11 until 12/23/2023, admitted for fever.  Wound infection suspected.  CT scan of abdomen and pelvis for evaluation of sacral pressure injury showed gas tracking down to the bone consistent with osteomyelitis.  He underwent I&D of right ischial ulcer (documented as buttock) with Dr. Bansal, medial tract leading to an abscess was identified.  Cavity was opened allowing it to drain into the main wound bed.  Wound VAC was placed and managed by wound team during this admission.  A bone scan of patient's left foot was also done, initially concerning for osteomyelitis.  Orthopedic surgery was consulted and did not recommend surgical intervention. ID consulted also, recommended the patient to receive IV ertapenem 1 g every 24 hours plus IV daptomycin 8 mg/kg every 24 hours through 1/22/2024.   He was discharged to [facility] on 1/23 for IV antibiotics and wound care.  From the LTAC he was discharged home on 1/22 with home health and referral back to [facility] to resume management of his wounds  .      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  Never used smokeless tobacco    Patient's problem list, allergies, and current medications reviewed and updated in Epic    Interval History:  Interval History thinned 7/29/2022.  Please see previous notes for complete interval history.   Interval History thinned 1/27/2023. Please see previous note for complete interval history.  Interval History thinned 3/3/2023.  Please see previous notes for complete interval history.    Interval History thinned 8/4/2023.  Please see previous notes for complete interval history.    Interval History thinned 1/31/2024.  Please see previous notes for complete interval history.    Interval History thinned 6/26/2024.  Please see previous notes for complete interval history.      6/19/2024: Clinic visit with Steve Hodges MD. Patient denies any fevers or chills. Reports he is tolerating Abx. He has appointment with ID later today to discuss OM treatment. He is tolerating wound VAC. Slight bone exposed bilaterally in ischial wounds, slightly worse.    6/26/2024 : Clinic visit with ADILSON Arthur, FNPEGGY-BC, CWDINESHN, CFTRACI.   Patient presents today with significant deterioration of his both ischial wounds, with increased depth of undermining.   He now has a PICC line, and will be starting outpatient infusions of ertapenem later today.  He states he is feeling well, denies fevers, chills, nausea, vomiting, cough or shortness of breath.  Due to deterioration of his wound, we did discuss possibly having him go over the hospital for surgical debridement and IV antibiotics.  He was hesitant to do so.  We agreed to see how he looks after a week or so IV antibiotics.  He is agreeable to minimizing time up in his wheelchair.  He also agrees to go to the emergency room immediately if he develops any signs or symptoms of infection.    7/10/2024: Clinic visit with Steve Hodges MD. Patient reports feeling in normal state of health. He missed  last appointment as he had fall out of wheelchair and was evaluated in ED. He denies any injuries from fall. Patient wounds are measuring slightly smaller. He continues on Ertapenem. Patient reports that he has appointment for seating evaluation from [facility] scheduled, but does not recall the date.    7/17/2024 : Clinic visit with ADILSON Arthur, HOLDEN, LILIYA VALERIO.   Anjum states he is feeling well.  Left ischial wound measures significantly smaller today, however measurements vary somewhat depending on pt's position.  Both wounds appear to be progressing slightly, with improving tissue quality.    7/24/2024 : Clinic visit with ADILSON Arthur, HOLDEN, IMAN, LILIYA.   Patient continues to feel well, offers no complaints.  Right ischial wound measures smaller, left ischial wound is larger.  Patient tolerating VAC without any difficulty.  Home health continues to see him in between clinic visits.  He is still receiving daily infusions of IV antibiotics, will be completing these in August.    7/31/2024 : Clinic visit with ADILSON Arthur, HOLDEN, IMAN, LILIYA.   Anjum continues to feel well, his wounds are slowly progressing.  Tolerating VAC.  He is on IV antibiotics for 1 more week.  Admits that he is up in his wheelchair for 10 to 14 hours/day.    8/7/2024 : Clinic visit with ADILSON Arthur FNP-BC, LILIYA VALERIO.   Anjum states he is feeling well today, offers no complaints.  Both wounds measure a bit smaller today, new granulation tissue noted.  He will be getting his last IV antibiotic infusion today.    8/14/2024 : Clinic visit with ADILSON Arthur FNP-BC, IMAN, LILIYA.   Patient continues to feel well.  He has completed his antibiotics, followed up with ID earlier this week, no further antibiotic therapy at this time.  Ischial wounds are slowly progressing.  Lower extremity wounds remain resolved.    8/21/2024 : Clinic visit with ADILSON Arthur FNP-BC, LILIYA VALERIO.   Anjum states he is feeling  well, offers no complaints.  His wounds are slowly progressing, increased granulation.    8/28/2024 : Clinic visit with Kelly Sandy, ADILSON, FNP-BC, CWDINESHN, CFTRACI.   Turk states he is feeling well overall.  His wounds measure slightly smaller.  He has had some urinary symptoms, reports leakage from around his suprapubic catheter last night, and excessively full urine bag.  He has been in contact with his urologist office.  He also mentions that he has a recurring small scab to his nose, states that it falls off only to return shortly after.  This has been going on for several months.  I offered to refer him to dermatology.    REVIEW OF SYSTEMS:   Unchanged from previous wound clinic assessment on 8/21/2024, except as noted in interval history above.      PHYSICAL EXAMINATION:   /66   Pulse 85   Temp 36.9 °C (98.4 °F) (Temporal)   Resp 20   Physical Exam  Constitutional:       Appearance: He is obese.   Cardiovascular:      Rate and Rhythm: Normal rate.   Pulmonary:      Effort: Pulmonary effort is normal.   Abdominal:      Comments: Colostomy left lower quadrant   Genitourinary:     Comments: Suprapubic catheter to down drain   Skin:     Comments: Stage IV pressure injury to right ischium: Wound measures larger after excision of skin flap.  Scattered areas of slough to wound bed.  New granulation tissue observed.  Moderate serous drainage, mild odor.  No periwound erythema or induration    Stage IV pressure injury of left ischium: Wound area is larger today, though dimensions tend to be positional.  Scattered areas of slough.  New granulation tissue noted.  Moderate serous drainage, mild odor.  No periwound erythema or induration          All other wounds remain healed, high risk for recurrence     Neurological:      Mental Status: He is alert and oriented to person, place, and time.   Psychiatric:         Mood and Affect: Mood normal.         WOUND ASSESSMENT  Wound 12/12/23 Pressure Injury Ischium Right  (Active)   Wound Image    08/28/24 1600   Site Assessment Yellow;Pink;Red 08/28/24 1600   Periwound Assessment Intact;Fragile;Scar tissue 08/28/24 1600   Margins Unattached edges 08/28/24 1600   Drainage Amount Large 08/28/24 1600   Drainage Description Serosanguineous;Serous 08/28/24 1600   Treatments Cleansed;Provider debridement 08/28/24 1600   Wound Cleansing Dakin's Solution 08/28/24 1600   Periwound Protectant Skin Protectant Wipes to Periwound;Drape 08/28/24 1600   Dressing Changed Changed 08/28/24 1600   Dressing Cleansing/Solutions Not Applicable 08/28/24 1600   Dressing Options Wound Vac;Other (Comments);Offloading Dressing - Sacral 08/28/24 1600   Dressing Change/Treatment Frequency Monday, Wednesday, Friday, and As Needed 08/28/24 1600   Wound Team Following Weekly 06/05/24 1300   WOUND NURSE ONLY - Pressure Injury Stage 4 08/28/24 1600   Non-staged Wound Description Not applicable 05/08/24 1500   Wound Length (cm) 3.6 cm 08/28/24 1600   Wound Width (cm) 2.6 cm 08/28/24 1600   Wound Depth (cm) 1.1 cm 08/28/24 1600   Wound Surface Area (cm^2) 9.36 cm^2 08/28/24 1600   Wound Volume (cm^3) 10.296 cm^3 08/28/24 1600   Post-Procedure Length (cm) 3.6 cm 08/28/24 1600   Post-Procedure Width (cm) 3 cm 08/28/24 1600   Post-Procedure Depth (cm) 1.2 cm 08/28/24 1600   Post-Procedure Surface Area (cm^2) 10.8 cm^2 08/28/24 1600   Post-Procedure Volume (cm^3) 12.96 cm^3 08/28/24 1600   Wound Healing % 82 08/28/24 1600   Tunneling (cm) 0 cm 08/28/24 1600   Tunneling Clock Position of Wound 2 07/10/24 1615   Undermining (cm) 2.6 cm 08/28/24 1600   Undermining of Wound, 1st Location From 11 o'clock;To 2 o'clock 08/28/24 1600   Undermining (cm) - 2nd location 0 cm 08/28/24 1600   Undermining of Wound, 2nd Location From 12 o'clock;To 2 o'clock 08/14/24 1545   Wound Odor None 08/28/24 1600   Exposed Structures Adipose 08/28/24 1600   Number of days: 260       Wound 02/16/24 Full Thickness Wound Leg Medial Left (Active)    Wound Image   08/28/24 1600   Site Assessment Epithelialization 08/21/24 1600   Periwound Assessment Scar tissue;Edema 08/21/24 1600   Margins Attached edges 05/29/24 1600   Drainage Amount None 08/21/24 1600   Drainage Description Serosanguineous 06/05/24 1300   Treatments Cleansed 08/14/24 1545   Wound Cleansing Foam Cleanser/Washcloth 08/14/24 1545   Periwound Protectant Not Applicable 08/14/24 1545   Dressing Cleansing/Solutions Not Applicable 08/14/24 1545   Dressing Options Offloading Dressing - Heel;Tubigrip;Other (Comments) 08/21/24 1600   Dressing Change/Treatment Frequency Monday, Wednesday, Friday, and As Needed 08/14/24 1545   Wound Team Following Weekly 06/05/24 1300   Non-staged Wound Description Not applicable 08/14/24 1545   Wound Length (cm) 0 cm 08/21/24 1600   Wound Width (cm) 0 cm 08/21/24 1600   Wound Depth (cm) 0 cm 08/21/24 1600   Wound Surface Area (cm^2) 0 cm^2 08/21/24 1600   Wound Volume (cm^3) 0 cm^3 08/21/24 1600   Post-Procedure Length (cm) 0 cm 08/14/24 1545   Post-Procedure Width (cm) 0 cm 08/14/24 1545   Post-Procedure Depth (cm) 0 cm 08/14/24 1545   Post-Procedure Surface Area (cm^2) 0 cm^2 08/14/24 1545   Post-Procedure Volume (cm^3) 0 cm^3 08/14/24 1545   Wound Healing % 100 08/21/24 1600   Tunneling (cm) 0 cm 08/14/24 1545   Undermining (cm) 0 cm 08/14/24 1545   Wound Odor None 08/07/24 1400   Exposed Structures None 08/14/24 1545   Number of days: 194       Wound 05/01/24 Pressure Injury Ischium Left (Active)   Wound Image    08/28/24 1600   Site Assessment Red;Pink;Yellow;Grey 08/28/24 1600   Periwound Assessment Scar tissue;Fragile;Other (Comment) 08/28/24 1600   Margins Unattached edges 08/28/24 1600   Closure Secondary intention 05/08/24 1500   Drainage Amount Large 08/28/24 1600   Drainage Description Serosanguineous;Serous 08/28/24 1600   Treatments Cleansed;Provider debridement 08/28/24 1600   Wound Cleansing Dakin's Solution 08/28/24 1600   Periwound Protectant Skin  Protectant Wipes to Periwound;Drape 08/28/24 1600   Dressing Status Intact 05/01/24 1600   Dressing Changed Changed 08/28/24 1600   Dressing Cleansing/Solutions Not Applicable 08/28/24 1600   Dressing Options Wound Vac;Offloading Dressing - Sacral;Other (Comments) 08/28/24 1600   Dressing Change/Treatment Frequency Monday, Wednesday, Friday, and As Needed 08/28/24 1600   Wound Team Following Weekly 06/05/24 1300   WOUND NURSE ONLY - Pressure Injury Stage 4 08/28/24 1600   Non-staged Wound Description Full thickness 06/05/24 1300   Wound Length (cm) 5 cm 08/28/24 1600   Wound Width (cm) 4 cm 08/28/24 1600   Wound Depth (cm) 3 cm 08/28/24 1600   Wound Surface Area (cm^2) 20 cm^2 08/28/24 1600   Wound Volume (cm^3) 60 cm^3 08/28/24 1600   Post-Procedure Length (cm) 5.5 cm 08/28/24 1600   Post-Procedure Width (cm) 4 cm 08/28/24 1600   Post-Procedure Depth (cm) 3 cm 08/28/24 1600   Post-Procedure Surface Area (cm^2) 22 cm^2 08/28/24 1600   Post-Procedure Volume (cm^3) 66 cm^3 08/28/24 1600   Wound Healing % -[id number] 08/28/24 1600   Tunneling (cm) 0 cm 08/28/24 1600   Undermining (cm) 2.7 cm 08/28/24 1600   Undermining of Wound, 1st Location From 10 o'clock;To 3 o'clock 08/28/24 1600   Wound Odor None 08/07/24 1400   Exposed Structures Adipose;Fascia;Muscle 08/28/24 1600   Number of days: 119       PROCEDURE: Excisional debridement of right and left ischial wounds  -2% viscous lidocaine applied topically to wound beds for approximately 5 minutes prior to debridement  -Scalpel used to remove skin flap from medial edge of right ischial wound.  Area of tissue removed was approximately 4.0 x 0.5 cm.  Bleeding controlled with manual pressure and over collagen  -Curette then used to debride wound beds.  Excisional debridement was performed to remove devitalized tissue until healthy, bleeding tissue was visualized.   Total area debrided was 32.8 cm².  Tissue debrided into the muscle/fascia layer.    -Bleeding controlled with  "manual pressure.    -Wound care completed by wound RN, refer to flowsheet  -Patient tolerated the procedure well, without c/o pain or discomfort.    Pertinent Labs and Diagnostics:    Labs:     A1c: No results found for: \"HBA1C\"     [facility] results, 7/1/2022 (under media tab)    CRP 13    ESR 31      IMAGING:     X-ray left tib-fib ordered 2/16/2024 through [facility]    12/11/2023-CT of abdomen pelvis with contrast  IMPRESSION:   1.  Right ischial decubitus ulcer extending to bone with soft tissue gas tracking along the right perineum. Appearance suggesting osteomyelitis, consider component of necrotizing fasciitis as clinically appropriate.  2.  Small pericardial effusion  3.  Left adrenal nodule, density on prior noncontrast CT demonstrates adenoma.  4.  Hepatomegaly  5.  Enlarged prostate, workup and evaluation for causes of prostate enlargement recommended as clinically appropriate.  6.  Atherosclerosis and atherosclerotic coronary artery disease    12/15/2023-bone scan of left foot  IMPRESSION:     1.  Mild increased activity in the LEFT 1st and 3rd toes on blood pool and delayed images possibly indicating inflammation/infection.  2.  No significant blood flow asymmetry.          VASCULAR STUDIES: No results found.    LAST  WOUND CULTURE:   Lab Results   Component Value Date/Time    CULTRSULT Moderate growth mixed enteric ade. (A) 06/05/2024 01:40 PM    CULTRSULT Bacteroides ovatus group  Moderate growth   (A) 06/05/2024 01:40 PM    CULTRSULT Beta Streptococcus Group C  Moderate growth   (A) 06/05/2024 01:40 PM      PATHOLOGY  2/17/2023-bone fragment extracted from left lower extremity wound\\  FINAL DIAGNOSIS:     A. Left leg bone fragment at base of chronic wound:          Extensively degenerated fibrocartilaginous tissue with a rim of           fibrinopurulent debris          Correlate with culture findings            Comment: While no residual intact bone is identified, these           findings " are suggestive of adjacent osteomyelitis.      ASSESSMENT AND PLAN:     1. Sacral decubitus ulcer, stage IV ([facility])  Comments: Ulcer first noted in early April 2022 as small open area which quickly enlarged.  This ulcer is present distal from previous sacral ulcer which healed after surgery in January.  Patient has history of flap reconstruction x2 to this area.    8/28/2024: Remains resolved, though at high risk for recurrence  -Patient does spend a lot of time up in his wheelchair, and wishes to continue to do so for his quality of life.  He lives independently, drives, and is involved with family activities.  He does have a [employer] wheelchair cushion, suspect may be inadequate. Has been referred to [facility] for repeat seating evaluation.  Evaluation is scheduled for sometime in September  - Known OM that was previously treated. CT scan done during recent hospitalization did not show OM of sacrum, however OM of the ischium noted.  -Monitor site each clinic visit  -Patient is very well versed in pressure relief strategies    Wound care: Silicone foam pressure relieving dressing    2. Pressure injury of right ischium, stage 4 ([facility])  Comments: Abscess and OM found on CT during hospitalization in December 2023.  Patient underwent I&D with VAC placement.  IV antibiotics through 1/22/2024.    8/28/2024: Skin flap over medial margin of wound excised in clinic noted, preventing migration of epithelium.  Wound measures larger after excision.  -Skin flap excised in clinic today  - Excisional debridement of nonviable tissue from wound in clinic today, medically necessary to promote wound healing  -Home health has been instructed to continue Dakins soaked gauze to wound for prior to placement of new VAC dressing as needed for odor / slough.  Per patient, home health nurses doing this every visit.  States he has plenty of Dakin's, orders off [employer] as it is cheaper than his co-pay with insurance.  -Continue wound VAC to right  ischial wound  -Patient to return to clinic weekly for assessment and debridement  -Home health to change dressing 2 times per week in between clinic visits.  Dakin's soaks for 15 minutes each dressing change  -Patient is very well versed on pressure relief measures, has adequate surface on bed, alternating low air loss.  -Seating eval ordered through [employer] has been scheduled, he does not recall when.    Wound care:  NPWT at -125 mmHg to accelerate granulation, change 3 times per week, sacral offloading foam.    3. Pressure injury of left ischium, stage 4 ([facility])    8/28/2024: Wound measures larger today.  As previously noted, measurements may vary based on patient's position.  New granulation tissue is noted.  Scattered areas of slough  - Excisional debridement of wound in clinic today, medically necessary to promote wound healing.  - Continue wound VAC  - Patient to return to clinic weekly for assessment and debridement  - Continue NPWT    Wound care: NPWT at -125 mmHg to accelerate granulation, change 3 times per week, sacral offloading foam.    4. Other acute osteomyelitis, other site ([facility])    8/28/2024: Bone fragments removed during clinic visit in June were sent for pathology, polymicrobial, most concerning was an MDR ESBL Proteus.   -Patient completed IV antibiotics.  No further antibiotics at this time per ID  -Patient understands he has a very low threshold for infection/sepsis.  He understands he is to go to the emergency room immediately if he begins to experience fevers, chills, nausea or vomiting, or if he notices radiating erythema or purulent drainage from his wounds.  -ID following    5. Postoperative wound dehiscence, subsequent encounter  6. Osteomyelitis of left lower extremity ([facility])  Comments: On 4/26/2022 patient underwent irrigation and debridement of multiple compartments of the left lower extremity states for pressure injury, with bone excision, and complex closure of chronic wound using  biologic skin substitute.  During postop visit in surgeons office on 5/11, surgical site was noted to be dehisced.      8/28/2024: Remains resolved, though covered with fragile epithelium.  Historically has waxed and waned, opened and closed.  Has been close for several months now.  High risk for recurrence.  Known osteomyelitis.  Patient has declined amputation  -No excisional debridement of these wounds required today  -Patient to be mindful of offloading when supine, should assure that his leg is not rotating medially  -Monitor site each clinic visit  Wound care: Silicone foam dressing, Tubigrip D    7. Pressure injury of left foot, stage 4 ([facility])  8. Pressure injury of left leg, stage 3 ([facility])  9. Pressure injury of left heel, stage 3 ([facility])    8/28/2024: All of these wounds remain resolved  - High risk for reoccurrence  - Continue to monitor  - Patient aware of offloading.    10. Quadriplegia, C5-C7 incomplete ([facility])  Comments: Complicating factor.  Impaired mobility and sensation  -Patient is still spending 7-8 hours/day up in his wheelchair, he has Roho cushion nearly 5 years old, and knows to reposition frequently.  Wears heel float boots bilaterally at all times  - Patient reports that he has seating evaluation with [employer] upcoming.    11.  Skin lesion    8/28/2024: Patient informing today that he has had a small scab to the end of his nose that has come and gone multiple times for several months.  He has never seen a dermatologist  -Referral to dermatology.  He is blue-eyed and light skin, [place].  Would benefit from routine skin checks    Please note that this note may have been created using voice recognition software. I have worked with technical experts from [employer] to optimize the interface.  I have made every reasonable attempt to correct obvious errors, but there may be errors of grammar and possibly content that I did not discover before finalizing the note.

## 2024-08-29 ENCOUNTER — HOSPITAL ENCOUNTER (OUTPATIENT)
Facility: MEDICAL CENTER | Age: 74
End: 2024-08-29
Attending: PHYSICIAN ASSISTANT
Payer: MEDICARE

## 2024-08-29 PROCEDURE — 87086 URINE CULTURE/COLONY COUNT: CPT

## 2024-08-29 PROCEDURE — 87077 CULTURE AEROBIC IDENTIFY: CPT

## 2024-08-29 PROCEDURE — 87186 SC STD MICRODIL/AGAR DIL: CPT

## 2024-09-04 ENCOUNTER — OFFICE VISIT (OUTPATIENT)
Dept: WOUND CARE | Facility: MEDICAL CENTER | Age: 74
End: 2024-09-04
Payer: MEDICARE

## 2024-09-04 DIAGNOSIS — L89.314 PRESSURE INJURY OF RIGHT ISCHIUM, STAGE 4 (HCC): ICD-10-CM

## 2024-09-04 DIAGNOSIS — L89.324 PRESSURE INJURY OF LEFT ISCHIUM, STAGE 4 (HCC): ICD-10-CM

## 2024-09-04 NOTE — PROGRESS NOTES
Patient called the clinic at 2:30 to state he might be late due to transportation issue.  His van is in the shop and he is relying on taxi for transportation.  States he called them at noon and has not yet heard back.  Informed patient that if he were able to get to the clinic by 330, we would still be able to see him due to a cancellation on my schedule.  He called back later to state he is still not heard back from the Company, and therefore would not make his appointment today.    We discussed having home health come to see him tomorrow, however that would mean they would skip Friday then, so either way he would be going 4 days in between VAC dressing change.  He denies any problems with his VAC, no leaks.  We decided to have home health see him on Friday and to keep him on the same schedule.  Patient to return to clinic next week, hopefully his transportation issue will be resolved.

## 2024-09-04 NOTE — PROGRESS NOTES
Patient unable to make it to clinic today due to transportation issues. Called [facility] to notify them of patient missing visit today, they confirmed they will see patient Friday. See Abby DE ANDA's note for details.

## 2024-09-11 ENCOUNTER — OFFICE VISIT (OUTPATIENT)
Dept: WOUND CARE | Facility: MEDICAL CENTER | Age: 74
End: 2024-09-11
Payer: MEDICARE

## 2024-09-11 VITALS
RESPIRATION RATE: 18 BRPM | SYSTOLIC BLOOD PRESSURE: 128 MMHG | TEMPERATURE: 97.8 F | DIASTOLIC BLOOD PRESSURE: 70 MMHG | HEART RATE: 61 BPM

## 2024-09-11 DIAGNOSIS — L89.314 PRESSURE INJURY OF RIGHT ISCHIUM, STAGE 4 (HCC): ICD-10-CM

## 2024-09-11 DIAGNOSIS — T81.31XD POSTOPERATIVE WOUND DEHISCENCE, SUBSEQUENT ENCOUNTER: ICD-10-CM

## 2024-09-11 DIAGNOSIS — L89.623 PRESSURE INJURY OF LEFT HEEL, STAGE 3 (HCC): ICD-10-CM

## 2024-09-11 DIAGNOSIS — L89.324 PRESSURE INJURY OF LEFT ISCHIUM, STAGE 4 (HCC): ICD-10-CM

## 2024-09-11 DIAGNOSIS — M86.18 OTHER ACUTE OSTEOMYELITIS, OTHER SITE (HCC): ICD-10-CM

## 2024-09-11 DIAGNOSIS — L89.893 PRESSURE INJURY OF LEFT LEG, STAGE 3 (HCC): ICD-10-CM

## 2024-09-11 DIAGNOSIS — L89.894 PRESSURE INJURY OF LEFT FOOT, STAGE 4 (HCC): ICD-10-CM

## 2024-09-11 DIAGNOSIS — M86.9 OSTEOMYELITIS OF LEFT LOWER EXTREMITY (HCC): ICD-10-CM

## 2024-09-11 DIAGNOSIS — G82.54 QUADRIPLEGIA, C5-C7, INCOMPLETE (HCC): ICD-10-CM

## 2024-09-11 DIAGNOSIS — L89.154 SACRAL DECUBITUS ULCER, STAGE IV (HCC): ICD-10-CM

## 2024-09-11 PROCEDURE — 3074F SYST BP LT 130 MM HG: CPT | Performed by: NURSE PRACTITIONER

## 2024-09-11 PROCEDURE — 3078F DIAST BP <80 MM HG: CPT | Performed by: NURSE PRACTITIONER

## 2024-09-11 PROCEDURE — 11046 DBRDMT MUSC&/FSCA EA ADDL: CPT

## 2024-09-11 PROCEDURE — 11043 DBRDMT MUSC&/FSCA 1ST 20/<: CPT

## 2024-09-11 PROCEDURE — 11046 DBRDMT MUSC&/FSCA EA ADDL: CPT | Performed by: NURSE PRACTITIONER

## 2024-09-11 PROCEDURE — 11043 DBRDMT MUSC&/FSCA 1ST 20/<: CPT | Performed by: NURSE PRACTITIONER

## 2024-09-12 ENCOUNTER — HOSPITAL ENCOUNTER (OUTPATIENT)
Facility: MEDICAL CENTER | Age: 74
End: 2024-09-12
Attending: UROLOGY
Payer: MEDICARE

## 2024-09-12 LAB — AMBIGUOUS DTTM AMBI4: NORMAL

## 2024-09-12 PROCEDURE — 87186 SC STD MICRODIL/AGAR DIL: CPT

## 2024-09-12 PROCEDURE — 87077 CULTURE AEROBIC IDENTIFY: CPT

## 2024-09-12 PROCEDURE — 87086 URINE CULTURE/COLONY COUNT: CPT

## 2024-09-12 NOTE — PROGRESS NOTES
Provider Encounter- Pressure Injury        HISTORY OF PRESENT ILLNESS  Wound History:    START OF CARE IN CLINIC: 1/31/2024 (return to clinic after hospitalization)    REFERRING PROVIDER: Racquel York       WOUNDS-superior coccyx pressure injury, stage IV-resolved                                 Coccyx pressure injury, stage IV-resolved           Inferior coccyx pressure injury, stage IV resolved                                 Right ischial pressure injury, stage IV                                 Left heel pressure injury, recurring stage III-resolved                                 Left posterior lower leg/calf-stage III-resolved                                 Left medial lower leg surgical wound dehiscence-resolved, reopens frequently-resolved            Left ischial pressure injury, stage IV-first observed in clinic 5/1/2024          HISTORY: Patient with history of incomplete quadriplegia referred to [facility] for treatment of a stage IV pressure injury.  He has a history of previous pressure injuries to this area, and underwent muscle flaps in 2019, and then again in 2020.  He was seen in the wound clinic in November 2021 for an ulcer proximal from his current ulcer, and pressure injuries to his left posterior lower leg and left heel.  At that time, it was discovered that the patient had retained VAC foam embedded in the wound bed of the sacral wound.  Attempts were made to get him back to his plastic surgeon, though unsuccessful.  In January he underwent surgical removal of VAC sponge along with excisional debridement of his sacral wound by Dr. Chaves.  After the surgery, his wound went on to heal without incident.   In early April 2022, his home health nurse noted a new sacral ulcer, below the previous ulcer which quickly tripled in size over the following weeks.  The ulcer to his left medial lower leg had also deteriorated, with bone visible at the base..  He was hospitalized from 4/22 until 4/27/2022 and  underwent surgery with Dr. Raman on 4/26 for irrigation and debridement of multiple compartments of the left lower extremity, bone excision, and complex closure of chronic wound using biologic skin substitute.   His sacrococcygeal wound was not surgically addressed during this admission.  He was discharged back to his group home, with home health, and referral to outpatient wound clinic for his sacral wound.  He was instructed to follow-up with his surgeon for his lower leg wound.       Postoperatively, the left medial lower leg incision dehisced.  He was seen by his surgeon at [facility] on 5/11.  The surgeon opted to leave remaining sutures in place, and refer him to the wound clinic for treatment of this wound.   Treatment of this wound was initiated in clinic on 5/12.  During this visit was also noted that his heel DTI had resolved, but that he had a new pressure injury to his left posterior lower extremity.     A new pressure injury was noted to patient's right upper buttock/lower back on 5/20/2022.  Wound was linear in shape, skin discolored but intact.     Abrasion noted to left anterior lower leg.  First observed in clinic on 7/22/2022.  Patient states he bumped his leg into his food tray.     Small DTI noted to patient's left lateral lower leg on 7/29/2022.  Skin intact but discolored.     Large area of deep tissue injury noted to patient's left exterior lower leg.  Patient denied any trauma to this area.  Skin intact.  Wound documented.    1/27/2023: Patient was admitted to [facility] from 1/23-1/25/2023 with gross hematuria. He underwent RICHARD which showed watchman device was in place and he was taken off of Xarelto. While hospitalized wound team was consulted. He was referred back to [facility] and home health upon discharge.    Patient was hospitalized at [facility] for pyelonephritis from 2/26 until 3/2/2023, admitted for fever and general malaise.  He was admitted and initially started on linezolid and meropenem for suspected  UTI and history of multidrug-resistant organisms.  Urine cultures were negative. ID was consulted, recommended CT of chest and abdomen,which were negative for acute findings. However, he was treated with 5 days total course of antibiotics for suspected UTI, and symptoms completely resolved.  During this admission, the inpatient wound team was consulted for treatment of his sacral and lower leg wounds.  A wound culture was taken from his left heel pressure ulcer, negative.  Once stabilized, he was discharged home and referred back to [facility] to resume treatment of his wounds.    Patient was hospitalized at [facility] from 12/11 until 12/23/2023, admitted for fever.  Wound infection suspected.  CT scan of abdomen and pelvis for evaluation of sacral pressure injury showed gas tracking down to the bone consistent with osteomyelitis.  He underwent I&D of right ischial ulcer (documented as buttock) with Dr. Bansal, medial tract leading to an abscess was identified.  Cavity was opened allowing it to drain into the main wound bed.  Wound VAC was placed and managed by wound team during this admission.  A bone scan of patient's left foot was also done, initially concerning for osteomyelitis.  Orthopedic surgery was consulted and did not recommend surgical intervention. ID consulted also, recommended the patient to receive IV ertapenem 1 g every 24 hours plus IV daptomycin 8 mg/kg every 24 hours through 1/22/2024.   He was discharged to [facility] on 1/23 for IV antibiotics and wound care.  From the LTAC he was discharged home on 1/22 with home health and referral back to [facility] to resume management of his wounds  .      Pertinent Medical History: Incomplete quadriplegia, history of stage IV pressure injuries, history of flap procedures to sacral pressure injuries, osteomyelitis, obesity, colostomy in place   Contributing factors: Immobility and Obesity, impaired sensation    Personal support: Attendant-staff at residential and home health  nursing    TOBACCO USE:   Former smoker, quit in 1977.  Never used smokeless tobacco    Patient's problem list, allergies, and current medications reviewed and updated in Epic    Interval History:  Interval History thinned 7/29/2022.  Please see previous notes for complete interval history.   Interval History thinned 1/27/2023. Please see previous note for complete interval history.  Interval History thinned 3/3/2023.  Please see previous notes for complete interval history.    Interval History thinned 8/4/2023.  Please see previous notes for complete interval history.    Interval History thinned 1/31/2024.  Please see previous notes for complete interval history.    Interval History thinned 6/26/2024.  Please see previous notes for complete interval history.      6/19/2024: Clinic visit with Steve Hodges MD. Patient denies any fevers or chills. Reports he is tolerating Abx. He has appointment with ID later today to discuss OM treatment. He is tolerating wound VAC. Slight bone exposed bilaterally in ischial wounds, slightly worse.    6/26/2024 : Clinic visit with ADILSON Arthur, FNPEGGY-BC, CWDINESHN, CFTRACI.   Patient presents today with significant deterioration of his both ischial wounds, with increased depth of undermining.   He now has a PICC line, and will be starting outpatient infusions of ertapenem later today.  He states he is feeling well, denies fevers, chills, nausea, vomiting, cough or shortness of breath.  Due to deterioration of his wound, we did discuss possibly having him go over the hospital for surgical debridement and IV antibiotics.  He was hesitant to do so.  We agreed to see how he looks after a week or so IV antibiotics.  He is agreeable to minimizing time up in his wheelchair.  He also agrees to go to the emergency room immediately if he develops any signs or symptoms of infection.    7/10/2024: Clinic visit with Steve Hodges MD. Patient reports feeling in normal state of health. He missed  last appointment as he had fall out of wheelchair and was evaluated in ED. He denies any injuries from fall. Patient wounds are measuring slightly smaller. He continues on Ertapenem. Patient reports that he has appointment for seating evaluation from [facility] scheduled, but does not recall the date.    7/17/2024 : Clinic visit with ADILSON Arthur, HOLDEN, LILIYA VALERIO.   Anjum states he is feeling well.  Left ischial wound measures significantly smaller today, however measurements vary somewhat depending on pt's position.  Both wounds appear to be progressing slightly, with improving tissue quality.    7/24/2024 : Clinic visit with ADILSON Arthur, HOLDEN, IMAN, LILIYA.   Patient continues to feel well, offers no complaints.  Right ischial wound measures smaller, left ischial wound is larger.  Patient tolerating VAC without any difficulty.  Home health continues to see him in between clinic visits.  He is still receiving daily infusions of IV antibiotics, will be completing these in August.    7/31/2024 : Clinic visit with ADILSON Arthur, HOLDEN, IMAN, LILIYA.   Anjum continues to feel well, his wounds are slowly progressing.  Tolerating VAC.  He is on IV antibiotics for 1 more week.  Admits that he is up in his wheelchair for 10 to 14 hours/day.    8/7/2024 : Clinic visit with ADILSON Arthur FNP-BC, LILIYA VALERIO.   Anjum states he is feeling well today, offers no complaints.  Both wounds measure a bit smaller today, new granulation tissue noted.  He will be getting his last IV antibiotic infusion today.    8/14/2024 : Clinic visit with ADILSON Arthur FNP-BC, IMAN, LILIYA.   Patient continues to feel well.  He has completed his antibiotics, followed up with ID earlier this week, no further antibiotic therapy at this time.  Ischial wounds are slowly progressing.  Lower extremity wounds remain resolved.    8/21/2024 : Clinic visit with ADILSON Arthur FNP-BC, LILIYA VALERIO.   Anjum states he is feeling  well, offers no complaints.  His wounds are slowly progressing, increased granulation.    8/28/2024 : Clinic visit with ADILSON Arthur, HOLDEN, IMAN, LILIYA.   Anjum states he is feeling well overall.  His wounds measure slightly smaller.  He has had some urinary symptoms, reports leakage from around his suprapubic catheter last night, and excessively full urine bag.  He has been in contact with his urologist office.  He also mentions that he has a recurring small scab to his nose, states that it falls off only to return shortly after.  This has been going on for several months.  I offered to refer him to dermatology.    9/11/2024 : Clinic visit with ADISLON Arthur, HOLDEN, IMAN, LILIYA.   Anjum states he is feeling well.  He missed his appointment last week due to car troubles.  His vehicle is now running well.  Ischial wounds show some improvement, increased granulation tissue.  He does have a small reopening of left posterior lower leg wound, appears to be a small abrasion over area of scar tissue.    REVIEW OF SYSTEMS:   Unchanged from previous wound clinic assessment on 8/28/2024, except as noted in interval history above.      PHYSICAL EXAMINATION:   /70   Pulse 61   Temp 36.6 °C (97.8 °F) (Temporal)   Resp 18   Physical Exam  Constitutional:       Appearance: He is obese.   Cardiovascular:      Rate and Rhythm: Normal rate.   Pulmonary:      Effort: Pulmonary effort is normal.   Abdominal:      Comments: Colostomy left lower quadrant   Genitourinary:     Comments: Suprapubic catheter to down drain   Skin:     Comments: Stage IV pressure injury to right ischium: Wound measures larger, so dimensions tend to be positional.  Scattered areas of slough to wound bed.  Evidence of new granulation tissue.  Moderate serosanguineous drainage, no odor.  Periwound intact without erythema or induration    Stage IV pressure injury of left ischium: Wound measures a bit smaller, though dimensions tend to be  positional.  New granulation tissue noted.  Moderate serosanguineous drainage, no odor.  Periwound intact without erythema or induration.    Shallow abrasion to left posterior lower leg, over area of scar tissue: Appears to be shallow with clean wound bed.  Minimal to moderate sanguinous drainage, no odor.  No evidence of infection          All other wounds remain healed, high risk for recurrence     Neurological:      Mental Status: He is alert and oriented to person, place, and time.   Psychiatric:         Mood and Affect: Mood normal.         WOUND ASSESSMENT  Wound 12/12/23 Pressure Injury Ischium Right (Active)   Wound Image    09/11/24 1600   Site Assessment Pink;Red;Yellow 09/11/24 1600   Periwound Assessment Intact;Fragile 09/11/24 1600   Margins Unattached edges 09/11/24 1600   Drainage Amount Moderate 09/11/24 1600   Drainage Description Serosanguineous 09/11/24 1600   Treatments Cleansed;Provider debridement;Site care 09/11/24 1600   Wound Cleansing Dakin's Solution 09/11/24 1600   Periwound Protectant No-sting Skin Prep;Drape 09/11/24 1600   Dressing Changed Changed 09/11/24 1600   Dressing Cleansing/Solutions Not Applicable 09/11/24 1600   Dressing Options Wound Vac;Offloading Dressing - Sacral 09/11/24 1600   Dressing Change/Treatment Frequency Monday, Wednesday, Friday, and As Needed 09/11/24 1600   Wound Team Following Weekly 06/05/24 1300   WOUND NURSE ONLY - Pressure Injury Stage 4 09/11/24 1600   Non-staged Wound Description Not applicable 05/08/24 1500   Wound Length (cm) 3 cm 09/11/24 1600   Wound Width (cm) 2.6 cm 09/11/24 1600   Wound Depth (cm) 1.9 cm 09/11/24 1600   Wound Surface Area (cm^2) 7.8 cm^2 09/11/24 1600   Wound Volume (cm^3) 14.82 cm^3 09/11/24 1600   Post-Procedure Length (cm) 3.3 cm 09/11/24 1600   Post-Procedure Width (cm) 3 cm 09/11/24 1600   Post-Procedure Depth (cm) 1.9 cm 09/11/24 1600   Post-Procedure Surface Area (cm^2) 9.9 cm^2 09/11/24 1600   Post-Procedure Volume  (cm^3) 18.81 cm^3 09/11/24 1600   Wound Healing % 74 09/11/24 1600   Tunneling (cm) 0 cm 09/11/24 1600   Tunneling Clock Position of Wound 2 07/10/24 1615   Undermining (cm) 3 cm 09/11/24 1600   Undermining of Wound, 1st Location From 12 o'clock;To 6 o'clock 09/11/24 1600   Undermining (cm) - 2nd location 0 cm 09/11/24 1600   Undermining of Wound, 2nd Location From 12 o'clock;To 2 o'clock 08/14/24 1545   Wound Odor None 09/11/24 1600   Exposed Structures Adipose 09/11/24 1600   Number of days: 274       Wound 05/01/24 Pressure Injury Ischium Left (Active)   Wound Image    09/11/24 1600   Site Assessment Red;Pink;Yellow;Early/partial granulation 09/11/24 1600   Periwound Assessment Denuded;Scar tissue 09/11/24 1600   Margins Unattached edges 09/11/24 1600   Closure Secondary intention 05/08/24 1500   Drainage Amount Large 09/11/24 1600   Drainage Description Serosanguineous 09/11/24 1600   Treatments Cleansed;Provider debridement;Site care 09/11/24 1600   Wound Cleansing Dakin's Solution 09/11/24 1600   Periwound Protectant No-sting Skin Prep;Drape;Hydrocolloid 09/11/24 1600   Dressing Status Intact 05/01/24 1600   Dressing Changed Changed 09/11/24 1600   Dressing Cleansing/Solutions Not Applicable 09/11/24 1600   Dressing Options Wound Vac;Offloading Dressing - Sacral;Other (Comments) 09/11/24 1600   Dressing Change/Treatment Frequency Monday, Wednesday, Friday, and As Needed 09/11/24 1600   Wound Team Following Weekly 06/05/24 1300   WOUND NURSE ONLY - Pressure Injury Stage 4 09/11/24 1600   Non-staged Wound Description Full thickness 06/05/24 1300   Wound Length (cm) 5 cm 09/11/24 1600   Wound Width (cm) 3.5 cm 09/11/24 1600   Wound Depth (cm) 3.7 cm 09/11/24 1600   Wound Surface Area (cm^2) 17.5 cm^2 09/11/24 1600   Wound Volume (cm^3) 64.75 cm^3 09/11/24 1600   Post-Procedure Length (cm) 5.5 cm 09/11/24 1600   Post-Procedure Width (cm) 3.5 cm 09/11/24 1600   Post-Procedure Depth (cm) 3.7 cm 09/11/24 1600    Post-Procedure Surface Area (cm^2) 19.25 cm^2 09/11/24 1600   Post-Procedure Volume (cm^3) 71.225 cm^3 09/11/24 1600   Wound Healing % -[id number] 09/11/24 1600   Tunneling (cm) 0 cm 09/11/24 1600   Undermining (cm) 2.8 cm 09/11/24 1600   Undermining of Wound, 1st Location From 10 o'clock;To 3 o'clock 09/11/24 1600   Wound Odor None 08/07/24 1400   Exposed Structures Adipose;Fascia;Muscle 09/11/24 1600   Number of days: 133       Wound 09/11/24 Full Thickness Wound Leg Posterior;Lower Left (Active)   Wound Image   09/11/24 1600   Site Assessment Pink;Red 09/11/24 1600   Periwound Assessment Fragile 09/11/24 1600   Margins Undefined edges 09/11/24 1600   Drainage Amount Moderate 09/11/24 1600   Drainage Description Sanguineous 09/11/24 1600   Treatments Cleansed;Provider debridement;Site care 09/11/24 1600   Offloading/DME Other (comment) 09/11/24 1600   Wound Cleansing Hypochlorus Acid 09/11/24 1600   Periwound Protectant No-sting Skin Prep;Barrier Paste 09/11/24 1600   Dressing Status Clean;Dry;Intact 09/11/24 1600   Dressing Changed New 09/11/24 1600   Dressing Cleansing/Solutions Not Applicable 09/11/24 1600   Dressing Options Hydrofiber Silver;Offloading Dressing - Sacral 09/11/24 1600   Dressing Change/Treatment Frequency Every 72 hrs, and As Needed 09/11/24 1600   Wound Team Following Weekly 09/11/24 1600   Non-staged Wound Description Full thickness 09/11/24 1600   Wound Length (cm) 1.5 cm 09/11/24 1600   Wound Width (cm) 2 cm 09/11/24 1600   Wound Depth (cm) 0.1 cm 09/11/24 1600   Wound Surface Area (cm^2) 3 cm^2 09/11/24 1600   Wound Volume (cm^3) 0.3 cm^3 09/11/24 1600   Tunneling (cm) 0 cm 09/11/24 1600   Undermining (cm) 0 cm 09/11/24 1600   Wound Odor None 09/11/24 1600   Exposed Structures None 09/11/24 1600   Number of days: 0       PROCEDURE: Excisional debridement of right and left ischial wounds  -2% viscous lidocaine applied topically to wound beds for approximately 5 minutes prior to  "debridement  -Scalpel used to remove skin flap from medial edge of right ischial wound.  Area of tissue removed was approximately 4.0 x 0.5 cm.  Bleeding controlled with manual pressure and over collagen  -Curette then used to debride wound beds.  Excisional debridement was performed to remove devitalized tissue until healthy, bleeding tissue was visualized.  Total area debrided was approximately 30 cm², including into wound tracts.  Tissue debrided into the muscle/fascia layer.    -Bleeding controlled with manual pressure.    -Wound care completed by wound RN, refer to flowsheet  -Patient tolerated the procedure well, without c/o pain or discomfort.    -No excisional debridement of left posterior leg wound required    Pertinent Labs and Diagnostics:    Labs:     A1c: No results found for: \"HBA1C\"     [facility] results, 7/1/2022 (under media tab)    CRP 13    ESR 31      IMAGING:     X-ray left tib-fib ordered 2/16/2024 through [facility]    12/11/2023-CT of abdomen pelvis with contrast  IMPRESSION:   1.  Right ischial decubitus ulcer extending to bone with soft tissue gas tracking along the right perineum. Appearance suggesting osteomyelitis, consider component of necrotizing fasciitis as clinically appropriate.  2.  Small pericardial effusion  3.  Left adrenal nodule, density on prior noncontrast CT demonstrates adenoma.  4.  Hepatomegaly  5.  Enlarged prostate, workup and evaluation for causes of prostate enlargement recommended as clinically appropriate.  6.  Atherosclerosis and atherosclerotic coronary artery disease    12/15/2023-bone scan of left foot  IMPRESSION:     1.  Mild increased activity in the LEFT 1st and 3rd toes on blood pool and delayed images possibly indicating inflammation/infection.  2.  No significant blood flow asymmetry.          VASCULAR STUDIES: No results found.    LAST  WOUND CULTURE:   Lab Results   Component Value Date/Time    CULTRSULT - (A) 08/29/2024 10:10 AM    CULTRSULT " (A) 08/29/2024 10:10 AM     Proteus mirabilis ESBL  >100,000 cfu/mL  Extended Spectrum Beta-lactamase (ESBL) isolated.  ESBL's may be clinically resistant to therapy with  Penicillins,Cephalosporins or Aztreonam despite  apparent in vitro susceptibility to some of these agents.  The patient requires contact isolation.  Please contact pharmacy or an Infectious Disease Specialist  if you have any questions about appropriate therapy.        PATHOLOGY  2/17/2023-bone fragment extracted from left lower extremity wound\\  FINAL DIAGNOSIS:     A. Left leg bone fragment at base of chronic wound:          Extensively degenerated fibrocartilaginous tissue with a rim of           fibrinopurulent debris          Correlate with culture findings            Comment: While no residual intact bone is identified, these           findings are suggestive of adjacent osteomyelitis.      ASSESSMENT AND PLAN:     1. Sacral decubitus ulcer, stage IV (formerly [facility])  Comments: Ulcer first noted in early April 2022 as small open area which quickly enlarged.  This ulcer is present distal from previous sacral ulcer which healed after surgery in January.  Patient has history of flap reconstruction x2 to this area.    9/11/2024: Remains resolved, high risk for recurrence  -Patient does spend a lot of time up in his wheelchair, and wishes to continue to do so for his quality of life.  He lives independently, drives, and is involved with family activities.  He does have a roho wheelchair cushion, suspect may be inadequate. Has been referred to [facility] [place] for repeat seating evaluation.  Evaluation is scheduled for sometime in September  - Known OM that was previously treated. CT scan done during recent hospitalization did not show OM of sacrum, however OM of the ischium noted.  -Monitor site each clinic visit  -Patient is very well versed in pressure relief strategies    Wound care: Silicone foam pressure relieving dressing    2. Pressure injury of right  ischium, stage 4 (Former[facility])  Comments: Abscess and OM found on CT during hospitalization in December 2023.  Patient underwent I&D with VAC placement.  IV antibiotics through 1/22/2024.    9/11/2024: Skin flap over medial margin of wound excised in clinic noted, preventing migration of epithelium.  Wound measures larger after excision.  Measures a bit larger today, though dimensions tend to be dependent on patient's position.  -Skin flap excised in clinic today  - Excisional debridement of nonviable tissue from wound in clinic today, medically necessary to promote wound healing  -Home health has been instructed to continue Dakins soaked gauze to wound for prior to placement of new VAC dressing as needed for odor / slough.   -Continue wound VAC to right ischial wound  -Patient to return to clinic weekly for assessment and debridement  -Home health to change dressing 2 times per week in between clinic visits.  Dakin's soaks for 15 minutes each dressing change  -Patient is very well versed on pressure relief measures, has adequate surface on bed, alternating low air loss.  -Seating eval ordered through [facility] [employer] has been scheduled, he does not recall when.    Wound care:  NPWT at -125 mmHg to accelerate granulation, change 3 times per week, sacral offloading foam.    3. Pressure injury of left ischium, stage 4 ([facility])    9/11/2024: Wound measures smaller today.  As previously noted, measurements may vary based on patient's position.  New granulation tissue is noted.  Scattered areas of slough  - Excisional debridement of wound in clinic today, medically necessary to promote wound healing.  - Continue wound VAC  - Patient to return to clinic weekly for assessment and debridement  - Continue NPWT    Wound care: NPWT at -125 mmHg to accelerate granulation, change 3 times per week, sacral offloading foam.    4. Other acute osteomyelitis, other site ([facility]ly [facility])    9/11/2024: Bone fragments removed during clinic visit in June were sent  for pathology, polymicrobial, most concerning was an MDR ESBL Proteus.   -Patient completed IV antibiotics.  No further antibiotics at this time per ID  -Patient understands he has a very low threshold for infection/sepsis.  He understands he is to go to the emergency room immediately if he begins to experience fevers, chills, nausea or vomiting, or if he notices radiating erythema or purulent drainage from his wounds.  -ID following    5. Postoperative wound dehiscence, subsequent encounter  6. Osteomyelitis of left lower extremity ([facility])  Comments: On 4/26/2022 patient underwent irrigation and debridement of multiple compartments of the left lower extremity states for pressure injury, with bone excision, and complex closure of chronic wound using biologic skin substitute.  During postop visit in surgeons office on 5/11, surgical site was noted to be dehisced.      9/11/2024: Patient presents today with Scheffel abrasion to posterior left lower leg, over area of scar tissue from previous wound.  Does not appear to be due to pressure.  All other wounds remain resolved  -No excisional debridement of these wounds required today  -Patient to be mindful of offloading when supine, should assure that his leg is not rotating medially  -Monitor site each clinic visit  Wound care: Silicone foam dressing, Tubigrip D    7. Pressure injury of left foot, stage 4 ([facility])  8. Pressure injury of left leg, stage 3 ([facility])  9. Pressure injury of left heel, stage 3 ([facility])    9/11/2024: All of these wounds remain resolved though at high risk for recurrence  -Monitor pressure points each clinic visit  - Patient aware of offloading.    10. Quadriplegia, C5-C7 incomplete ([facility])  Comments: Complicating factor.  Impaired mobility and sensation  -Patient is still spending 7-8 hours/day up in his wheelchair, he has Roho cushion nearly 5 years old, and knows to reposition frequently.  Wears heel float boots bilaterally at all times  - [facility]        Please note that this note may have been created using voice recognition software. I have worked with technical experts from [facility] to optimize the interface.  I have made every reasonable attempt to correct obvious errors, but there may be errors of grammar and possibly content that I did not discover before finalizing the note.

## 2024-09-12 NOTE — PROGRESS NOTES
RN Wound Care Procedures 9/11/2024:  Wound vac reapplied to left and right ischium wounds using four pieces of black simplace granufoam (One piece into each wound, two pieces to bridge left ischium wound vac foam to the right flank). Negative pressure wound therapy resumed at 125 mmHg continuous pressure, no leaks noted.    Updated wound care order routed to [facility] via [facility].

## 2024-09-12 NOTE — PATIENT INSTRUCTIONS
-Keep your wound dressing clean, dry, and intact.     -Wound vac may not have any drainage in tube or cannister & it will still be working.   Change cannister if it does become full by pressing tab on side of machine to remove canister and snap on new one. Full canister can be thrown in the trash. If cannister fills with bright red blood - go to ER. Dressing will be changed every MWF at the wound clini.  If you are having issues with your wound VAC, please consider patching leaks, changing the canister, or calling [phone] for troubleshooting. If the wound VAC has been off or un-operational for over 2 hours, call wound care center to inform them and remove all dressings including black foam and replace with normal saline damp gauze.     -Should you experience any significant changes in your wound(s), such as infection (redness, swelling, localized heat, increased pain, fever > 101 F, chills) or have any questions regarding your home care instructions, please contact the wound center at [phone]. If after hours, contact your primary care physician or go to the hospital emergency room.

## 2024-09-18 ENCOUNTER — OFFICE VISIT (OUTPATIENT)
Dept: WOUND CARE | Facility: MEDICAL CENTER | Age: 74
End: 2024-09-18
Payer: MEDICARE

## 2024-09-18 ENCOUNTER — OFFICE VISIT (OUTPATIENT)
Dept: INFECTIOUS DISEASES | Facility: MEDICAL CENTER | Age: 74
End: 2024-09-18
Attending: NURSE PRACTITIONER
Payer: MEDICARE

## 2024-09-18 VITALS
BODY MASS INDEX: 30.78 KG/M2 | RESPIRATION RATE: 18 BRPM | TEMPERATURE: 97.8 F | SYSTOLIC BLOOD PRESSURE: 112 MMHG | OXYGEN SATURATION: 92 % | WEIGHT: 215 LBS | HEIGHT: 70 IN | HEART RATE: 71 BPM | DIASTOLIC BLOOD PRESSURE: 70 MMHG

## 2024-09-18 VITALS
RESPIRATION RATE: 18 BRPM | HEART RATE: 71 BPM | DIASTOLIC BLOOD PRESSURE: 70 MMHG | SYSTOLIC BLOOD PRESSURE: 112 MMHG | TEMPERATURE: 97.8 F

## 2024-09-18 DIAGNOSIS — N39.0 FREQUENT UTI: ICD-10-CM

## 2024-09-18 DIAGNOSIS — L89.154 SACRAL DECUBITUS ULCER, STAGE IV (HCC): ICD-10-CM

## 2024-09-18 DIAGNOSIS — M86.18 OTHER ACUTE OSTEOMYELITIS, OTHER SITE (HCC): ICD-10-CM

## 2024-09-18 DIAGNOSIS — L89.893 PRESSURE INJURY OF LEFT LEG, STAGE 3 (HCC): ICD-10-CM

## 2024-09-18 DIAGNOSIS — L89.623 PRESSURE INJURY OF LEFT HEEL, STAGE 3 (HCC): ICD-10-CM

## 2024-09-18 DIAGNOSIS — L89.314 PRESSURE INJURY OF RIGHT ISCHIUM, STAGE 4 (HCC): ICD-10-CM

## 2024-09-18 DIAGNOSIS — Z86.19 HISTORY OF INFECTION DUE TO EXTENDED SPECTRUM BETA LACTAMASE (ESBL) PRODUCING BACTERIA: ICD-10-CM

## 2024-09-18 DIAGNOSIS — M86.9 OSTEOMYELITIS OF LEFT LOWER EXTREMITY (HCC): ICD-10-CM

## 2024-09-18 DIAGNOSIS — L89.90 PRESSURE ULCERS OF SKIN OF MULTIPLE TOPOGRAPHIC SITES: ICD-10-CM

## 2024-09-18 DIAGNOSIS — G82.54 QUADRIPLEGIA, C5-C7, INCOMPLETE (HCC): ICD-10-CM

## 2024-09-18 DIAGNOSIS — T81.31XD POSTOPERATIVE WOUND DEHISCENCE, SUBSEQUENT ENCOUNTER: ICD-10-CM

## 2024-09-18 DIAGNOSIS — Z22.9 COLONIZED WITH INFECTIOUS ORGANISM: ICD-10-CM

## 2024-09-18 DIAGNOSIS — L89.894 PRESSURE INJURY OF LEFT FOOT, STAGE 4 (HCC): ICD-10-CM

## 2024-09-18 DIAGNOSIS — L89.324 PRESSURE INJURY OF LEFT ISCHIUM, STAGE 4 (HCC): ICD-10-CM

## 2024-09-18 PROCEDURE — 3074F SYST BP LT 130 MM HG: CPT | Performed by: NURSE PRACTITIONER

## 2024-09-18 PROCEDURE — 11043 DBRDMT MUSC&/FSCA 1ST 20/<: CPT | Performed by: STUDENT IN AN ORGANIZED HEALTH CARE EDUCATION/TRAINING PROGRAM

## 2024-09-18 PROCEDURE — 99212 OFFICE O/P EST SF 10 MIN: CPT | Performed by: NURSE PRACTITIONER

## 2024-09-18 PROCEDURE — 3074F SYST BP LT 130 MM HG: CPT | Performed by: STUDENT IN AN ORGANIZED HEALTH CARE EDUCATION/TRAINING PROGRAM

## 2024-09-18 PROCEDURE — 3078F DIAST BP <80 MM HG: CPT | Performed by: STUDENT IN AN ORGANIZED HEALTH CARE EDUCATION/TRAINING PROGRAM

## 2024-09-18 PROCEDURE — 11043 DBRDMT MUSC&/FSCA 1ST 20/<: CPT

## 2024-09-18 PROCEDURE — 11046 DBRDMT MUSC&/FSCA EA ADDL: CPT

## 2024-09-18 PROCEDURE — 3078F DIAST BP <80 MM HG: CPT | Performed by: NURSE PRACTITIONER

## 2024-09-18 PROCEDURE — 97605 NEG PRS WND THER DME<=50SQCM: CPT

## 2024-09-18 PROCEDURE — 99214 OFFICE O/P EST MOD 30 MIN: CPT | Performed by: NURSE PRACTITIONER

## 2024-09-18 PROCEDURE — 11046 DBRDMT MUSC&/FSCA EA ADDL: CPT | Performed by: STUDENT IN AN ORGANIZED HEALTH CARE EDUCATION/TRAINING PROGRAM

## 2024-09-18 ASSESSMENT — ENCOUNTER SYMPTOMS
NAUSEA: 0
CHILLS: 0
BRUISES/BLEEDS EASILY: 0
DIZZINESS: 0
HEADACHES: 0
SHORTNESS OF BREATH: 0
WEIGHT LOSS: 0
SPUTUM PRODUCTION: 0
ABDOMINAL PAIN: 0
PALPITATIONS: 0
DIARRHEA: 0
VOMITING: 0
CONSTIPATION: 0
FOCAL WEAKNESS: 1
BLURRED VISION: 0
COUGH: 0
FEVER: 0
NERVOUS/ANXIOUS: 0
WHEEZING: 0
DOUBLE VISION: 0
MYALGIAS: 0

## 2024-09-18 ASSESSMENT — FIBROSIS 4 INDEX: FIB4 SCORE: 2.52

## 2024-09-18 NOTE — PROGRESS NOTES
INFECTIOUS  DISEASE  OUTPATIENT CLINIC  NOTE   Subjective   Primary care provider: KATE Phillips.     Reason for Follow Up:   Follow-up for   1. Frequent UTI  CBC WITH DIFFERENTIAL      2. Pressure ulcers of skin of multiple topographic sites        3. History of infection due to extended spectrum beta lactamase (ESBL) producing bacteria        4. Colonized with infectious organism            HPI: Referred back to ID clinic, known to ID team for previous Ischial osteomyelitis with abscess   Osvaldo Pate is a 73 y.o. 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   6 weeks of IV antibiotics for sacral osteomyelitis in 3/23/22. He was hospitalized from 4/22 until 4/27/2022 and underwent surgery with Dr. Raman on 4/26 for irrigation and debridement of multiple compartments of the left lower extremity, bone excision, and complex closure of chronic wound using biologic skin substitute.  Patient also with history of ESBL infection in urine noted in 4/2023. s/p debridement necrotic muscle and bone 12/12/23. Operative cultures 12/12/23 ESBL Proteus (also fluoroquinolone and Bactrim resistant), pan susceptible Enterococcus, Prevotella. Completed 6 weeks of IV ertapenem  + IV daptomycin on 1/22/2024.  Patient continued to follow-up with wound care on a regular basis.  Most recently on 6/5/2024 patient followed up with wound care and was noted to have Left ischial wound with exposed bone and with few small bone fragments and some slough. He does report copious drainage from left ischial wound.  Wound team obtained culture and also sent a small dislodged component of bone to pathology.  Pathology results positive for acute osteomyelitis, negative for malignancy. Cultures +  Proteus mirabilis esbl,  Escherichia coli (pan sensitivity), and group G strep.  Referred to ID clinic for antibiotic therapy.  Completed 6-week course of IV ertapenem which ended on 8/7/2024.     Patient referred back to ID by urology.  Patient follow-up with urology on 8/29/24 and 9/9/2024 due to chronic suprapubic catheter.  Suprapubic catheter was exchanged in office as catheter was not draining and there was complete blockage.  History of neurogenic bladder with frequent UTIs (MRSA, Pseudomonas) and kidney stones.  It was reported it was difficult to know if patient was truly symptomatic due to history of paraplegia.  Due to urine retention urine sample was sent to lab during catheter exchange.  Urine culture positive for ESBL Proteus Mirabilis.  Urine dip positive for blood and positive for nitrates and leukocytes.  Outside medical records reviewed      9/18/24-patient referred back to [facility] due to positive urine culture for ESBL Proteus which is grown in other urine cultures in the past.  Given patient has chronic suprapubic catheter most likely colonized.  Multiple urine cultures since 2023 have been positive for the same bacteria. At the time of visit the urine culture is nearly 20 days old and today in clinic patient is not showing any signs or symptoms of infection.  Vital signs stable, he denies any recent fevers, chills, body shakes.  Due to his history of paraplegia it is difficult to diagnose with dysuria or bladder spasm.  There is sediment in his Cazares catheter drainage bag with no blood and clear yellow urine.  Patient states he has been on chronic Methenamine Hippurate 1g BID. Methenamine is a drug that stops the growth of bacteria in urine. This medication also contains an ingredient that helps to make the urine acidic.  Given that patient is asymptomatic today we will hold off on starting IV antibiotic therapy.  Repeat labs CBC plus differential for evaluation.  If leukocytosis then may possibly start treatment.  ED  "precautions discussed.        states he cannot have MRI because he has, \"ferrous metal\" in his body, placed in the 1970s.   Current Antimicrobials: IV  ertapenem 1 g every 24 hours, 6-week course with end date of 8/7/2024  Previous Antimicrobials:    Other Current Medications:  Home Medications    Medication Sig Taking? Last Dose Authorizing Provider   solifenacin (VESICARE) 10 MG tablet Take 5 mg by mouth every day. Yes Taking Physician Outpatient   baclofen (LIORESAL) 20 MG tablet TAKE 1 TABLET BY MOUTH 4 TIMES DAILY Yes Taking Wallace Moyer, A.P.R.N.   Mirabegron ER (MYRBETRIQ) 50 MG TABLET SR 24 HR Take 50 mg by mouth every day. Yes Taking Wallace Moyer, A.P.R.N.   NON SPECIFIED Take 3 Capsules by mouth every day. Balance of Nature-Whole Produce FRUITS-patient to provide supply Yes Taking Physician Outpatient   NON SPECIFIED Take 3 Capsules by mouth every day. Balance of Nature-Whole Produce VEGGIES-patient to provide supply Yes Taking Physician Outpatient   Sodium Hypochlorite (DAKINS 0.125%, 1/4 STRENGTH,) 0.125 % Solution Apply 473 mL topically two times a week. Yes Taking JF Duenas.P.N.   Nutritional Supplements (NUTRITIONAL DRINK PO) Take  by mouth. Yes Taking Physician Outpatient   amLODIPine (NORVASC) 5 MG Tab Take 1 Tablet by mouth as needed (Per MAR if blood pressure if less than 130/80). Yes Taking Wallace Moyer A.P.R.N.   losartan (COZAAR) 50 MG Tab Take 1 Tablet by mouth every day. Yes Taking Wallace Moyer A.P.R.N.   docusate sodium (COLACE) 100 MG Cap Take 2 Capsules by mouth 2 times a day. Yes Taking Lex Meier M.D.   Simethicone (GAS-X PO) Take 1 Tablet by mouth 2 times a day as needed (For gas). Yes Taking Physician Outpatient   acetaminophen (TYLENOL) 500 MG Tab Take 1,000 mg by mouth every 6 hours as needed for Moderate Pain. Yes Taking Physician Outpatient   diclofenac sodium (VOLTAREN) 1 % Gel Apply 1 g topically 4 times a day. Apply to " "both elbows Yes Taking JF Pretty.P.R.N.   polyethylene glycol/lytes (MIRALAX) 17 g Pack Take 17 g by mouth every day. Indications: Constipation Yes Taking Physician Outpatient   aspirin EC 81 MG EC tablet Take 1 Tablet by mouth every day. Yes Taking JF Russ.P.R.N.   Zinc 50 MG Tab Take 1 Tablet by mouth every morning. Yes Taking Physician Outpatient   Cholecalciferol (VITAMIN D3) 2000 UNIT Cap Take 1 Capsule by mouth every morning. Yes Taking Physician Outpatient   Magnesium 400 MG Tab Take 400 mg by mouth every morning. Yes Taking Physician Outpatient   Ascorbic Acid (VITAMIN C) 1000 MG Tab Take 1 Tablet by mouth every morning. Yes Taking Physician Outpatient   melatonin 5 mg Tab Take 10 mg by mouth at bedtime. Gummie Yes Taking Physician Outpatient   Probiotic Product (PROBIOTIC PO) Take 1 Capsule by mouth every morning. Yes Taking Physician Outpatient   sennosides (SENOKOT) 8.6 MG Tab Take 17.2 mg by mouth 2 times a day. Yes Taking Physician Outpatient   ferrous sulfate 325 (65 Fe) MG tablet Take 1 Tablet by mouth 2 times a day. Yes Taking Physician Outpatient        PMH:  Past Medical History:   Diagnosis Date    A-fib (Formerly [facility])     Acute cystitis without hematuria 10/23/2021    Acute UTI 06/18/2023    Arrhythmia     Atrial flutter (Formerly [facility])     Blood clotting disorder (Formerly [facility])     Patient is on Xarelto    Bowel habit changes     Colostomy    Clot hematuria 01/23/2023    GERD (gastroesophageal reflux disease)     Hypertension     Hypokalemia 06/18/2023    Kidney stones     Metabolic acidosis 06/18/2023    Neurogenic bladder     S/P cath    Open wound 11/18/2022    sacrum with wound vac, left ankle, [facility]    Quadriplegia, C5-C7 complete (Formerly [facility])     patient reports \" incomplete quad\"    Sepsis secondary to UTI (Formerly [facility]) 06/17/2023    SIRS (systemic inflammatory response syndrome) (Formerly [facility]) 12/12/2023    Suprapubic catheter (Formerly [facility])      Past Surgical History:   Procedure Laterality Date    IRRIGATION & " DEBRIDEMENT GENERAL Right 12/12/2023    Procedure: IRRIGATION AND DEBRIDEMENT, WOUND/BUTTOCKS;  Surgeon: Huan Bansal M.D.;  Location: [facility];  Service: General    IRRIGATION & DEBRIDEMENT GENERAL Left 04/26/2022    Procedure: IRRIGATION AND DEBRIDEMENT, WOUND - LEG;  Surgeon: Eric Raman M.D.;  Location: [facility];  Service: Orthopedics    WOUND CLOSURE NEURO Left 04/26/2022    Procedure: CLOSURE, WOUND;  Surgeon: Eric Raman M.D.;  Location: [facility];  Service: Orthopedics    ORTHOPEDIC OSTEOTOMY Left 04/26/2022    Procedure: OSTECTOMY;  Surgeon: Eric Raman M.D.;  Location: [facility];  Service: Orthopedics    INCISION AND DRAINAGE ORTHOPEDIC Left 01/27/2022    Procedure: INCISION AND DRAINAGE, WOUND, BY ORTHOPEDICS;  Surgeon: Erasmo Stewart M.D.;  Location: [facility];  Service: Orthopedics    BONE BIOPSY Left 01/27/2022    Procedure: BIOPSY, BONE;  Surgeon: Erasmo Stewart M.D.;  Location: [facility];  Service: Orthopedics    INCISION AND DRAINAGE ORTHOPEDIC  01/22/2022    Procedure: INCISION AND DRAINAGE, WOUND, BY ORTHOPEDICS;  Surgeon: Erasmo Stewart M.D.;  Location: [facility];  Service: Orthopedics    DC CYSTOSCOPY,INSERT URETERAL STENT Left 01/04/2022    Procedure: CYSTOSCOPY, WITH URETERAL STENT INSERTION;  Surgeon: Osvaldo Baltazar M.D.;  Location: [facility];  Service: Urology    DC CYSTO/URETERO/PYELOSCOPY, DX Left 01/04/2022    Procedure: URETEROSCOPY;  Surgeon: Osvaldo Baltazar M.D.;  Location: [facility];  Service: Urology    LASERTRIPSY N/A 01/04/2022    Procedure: LITHOTRIPSY, USING LASER;  Surgeon: Osvaldo Baltazar M.D.;  Location: [facility];  Service: Urology    DC CYSTOSCOPY,INSERT URETERAL STENT Left 12/16/2021    Procedure: CYSTOSCOPY, WITH URETERAL STENT INSERTION;  Surgeon: Aly Bowen M.D.;  Location: [facility];  Service: Urology     MI CYSTO/URETERO/PYELOSCOPY, DX Left 2021    Procedure: URETEROSCOPY;  Surgeon: Aly Bowen M.D.;  Location: [facility];  Service: Urology    LASERTRIPSY Left 2021    Procedure: LITHOTRIPSY, USING LASER;  Surgeon: Aly Bowen M.D.;  Location: [facility];  Service: Urology    IRRIGATION & DEBRIDEMENT GENERAL  2020    Procedure: IRRIGATION AND DEBRIDEMENT, WOUND SACRAL ULCER;  Surgeon: Matt Cummins M.D.;  Location: [facility];  Service: Plastics    ULCER DEBRIDEMENT N/A 2019    Procedure: debridement of Sacral grade 4 ulcer - W/BONE BIOPSY, 3 liter wash out. bilateral sliding gluteal myocutaneous flap advancement;  Surgeon: Amadeo Moon M.D.;  Location: [facility];  Service: Plastics    FLAP CLOSURE  2019    Procedure: CLOSURE, FLAP - MUSCLE;  Surgeon: Amadeo Moon M.D.;  Location: [facility];  Service: Plastics    COLOSTOMY N/A 2019    Procedure: CREATION, COLOSTOMY -  placement;  Surgeon: Elías Hannah M.D.;  Location: [facility];  Service: General    COLOSTOMY      COLOSTOMY TAKEDOWN      HERNIA REPAIR      ORIF, ANKLE      PERCUTANEOUOSPINNING LOWER EXTREMITY       Family History   Problem Relation Age of Onset    Heart Disease Father      Social History     Socioeconomic History    Marital status:      Spouse name: Not on file    Number of children: Not on file    Years of education: Not on file    Highest education level: Not on file   Occupational History    Not on file   Tobacco Use    Smoking status: Former     Current packs/day: 0.00     Average packs/day: 1 pack/day for 10.0 years (10.0 ttl pk-yrs)     Types: Cigarettes     Start date: 1967     Quit date: 1977     Years since quittin.7    Smokeless tobacco: Never   Vaping Use    Vaping status: Never Used   Substance and Sexual Activity    Alcohol use: Yes     Alcohol/week: 4.2 oz     Types: 7  "Standard drinks or equivalent per week     Comment: 2/day    Drug use: No    Sexual activity: Not on file   Other Topics Concern    Not on file   Social History Narrative    Not on file     Social Determinants of Health     Financial Resource Strain: Not on file   Food Insecurity: No Food Insecurity (12/6/2019)    Hunger Vital Sign     Worried About Running Out of Food in the Last Year: Never true     Ran Out of Food in the Last Year: Never true   Transportation Needs: No Transportation Needs (12/6/2019)    PRAPARE - Transportation     Lack of Transportation (Medical): No     Lack of Transportation (Non-Medical): No   Physical Activity: Not on file   Stress: Not on file   Social Connections: Not on file   Intimate Partner Violence: Not on file   Housing Stability: Not on file           Allergies/Intolerances:  Allergies   Allergen Reactions    Sulfa Drugs Rash     Rash, developed this back in 2015 after being placed on \"sulfa antibiotic for my wound\". Antibiotic was stopped and rash went away. Patient states he had a sulfa antibiotic prior to that time back when he was younger w/o a reaction.          ROS:   Review of Systems   Constitutional:  Negative for chills, fever, malaise/fatigue and weight loss.   HENT:  Negative for congestion and hearing loss.    Eyes:  Negative for blurred vision and double vision.   Respiratory:  Negative for cough, sputum production, shortness of breath and wheezing.    Cardiovascular:  Negative for chest pain and palpitations.   Gastrointestinal:  Negative for abdominal pain, constipation, diarrhea, nausea and vomiting.   Genitourinary:  Negative for dysuria.        Cazares catheter   Musculoskeletal:  Negative for myalgias.   Skin:  Negative for itching and rash.   Neurological:  Positive for focal weakness (Paraplegia). Negative for dizziness and headaches.   Endo/Heme/Allergies:  Does not bruise/bleed easily.   Psychiatric/Behavioral:  The patient is not nervous/anxious.       ROS " "was reviewed and were negative except as above.    Objective    Most Recent Vital Signs:  /70   Pulse 71   Temp 36.6 °C (97.8 °F) (Temporal)   Resp 18   Ht 1.778 m (5' 10\")   Wt 97.5 kg (215 lb)   SpO2 92%   BMI 30.85 kg/m²     Physical Exam:  Physical Exam  Vitals reviewed.   Constitutional:       Appearance: Normal appearance. He is obese.      Comments: Wheelchair   HENT:      Head: Normocephalic and atraumatic.      Nose: Nose normal.      Mouth/Throat:      Mouth: Mucous membranes are moist.   Eyes:      Pupils: Pupils are equal, round, and reactive to light.   Cardiovascular:      Rate and Rhythm: Normal rate.   Pulmonary:      Effort: Pulmonary effort is normal.      Breath sounds: Normal breath sounds.   Abdominal:      Palpations: Abdomen is soft.   Genitourinary:     Comments: Suprapubic Cazares catheter  Musculoskeletal:         General: No tenderness.      Cervical back: Normal range of motion and neck supple.      Comments: Stage IV pressure injury to right ischium-wound. Scattered slough to wound bed.  Areas of new granulation noted.  no odor.  No evidence of infection.  Probes to bone     Stage IV pressure injury of left ischium-wound. Scattered slough to wound bed.  Areas of new granulation.  No odor.  No evidence of infection.  Probes close to bone     Skin:     General: Skin is warm and dry.      Coloration: Skin is not jaundiced.      Findings: No erythema or rash.   Neurological:      Mental Status: He is alert and oriented to person, place, and time.      Motor: Weakness present.   Psychiatric:         Mood and Affect: Mood normal.         Behavior: Behavior normal.          Pertinent Lab/Imaging Results:  [unfilled]  @CMP@  WBC   Date/Time Value Ref Range Status   08/05/2024 04:29 PM 7.2 4.8 - 10.8 K/uL Final     RBC   Date/Time Value Ref Range Status   08/05/2024 04:29 PM 3.68 (L) 4.70 - 6.10 M/uL Final     Hemoglobin   Date/Time Value Ref Range Status   08/05/2024 04:29 PM 11.7 (L) " 14.0 - 18.0 g/dL Final     Hematocrit   Date/Time Value Ref Range Status   08/05/2024 04:29 PM 35.8 (L) 42.0 - 52.0 % Final     MCV   Date/Time Value Ref Range Status   08/05/2024 04:29 PM 97.3 81.4 - 97.8 fL Final     MCH   Date/Time Value Ref Range Status   08/05/2024 04:29 PM 31.8 27.0 - 33.0 pg Final     MCHC   Date/Time Value Ref Range Status   08/05/2024 04:29 PM 32.7 32.3 - 36.5 g/dL Final     MPV   Date/Time Value Ref Range Status   08/05/2024 04:29 PM 8.6 (L) 9.0 - 12.9 fL Final      Sodium   Date/Time Value Ref Range Status   08/05/2024 04:29  135 - 145 mmol/L Final     Potassium   Date/Time Value Ref Range Status   08/05/2024 04:29 PM 4.0 3.6 - 5.5 mmol/L Final     Chloride   Date/Time Value Ref Range Status   08/05/2024 04:29  96 - 112 mmol/L Final     Co2   Date/Time Value Ref Range Status   08/05/2024 04:29 PM 22 20 - 33 mmol/L Final     Glucose   Date/Time Value Ref Range Status   08/05/2024 04:29 PM 94 65 - 99 mg/dL Final     Bun   Date/Time Value Ref Range Status   08/05/2024 04:29 PM 18 8 - 22 mg/dL Final     Creatinine   Date/Time Value Ref Range Status   08/05/2024 04:29 PM 0.47 (L) 0.50 - 1.40 mg/dL Final     Bun-Creatinine Ratio   Date/Time Value Ref Range Status   01/22/2024 06:47 AM 45.0 (H) 10.0 - 20.0 Final     Alkaline Phosphatase   Date/Time Value Ref Range Status   08/05/2024 04:29  (H) 30 - 99 U/L Final     AST(SGOT)   Date/Time Value Ref Range Status   08/05/2024 04:29 PM 15 12 - 45 U/L Final     ALT(SGPT)   Date/Time Value Ref Range Status   08/05/2024 04:29 PM 5 2 - 50 U/L Final     Total Bilirubin   Date/Time Value Ref Range Status   08/05/2024 04:29 PM 0.4 0.1 - 1.5 mg/dL Final      CPK Total   Date/Time Value Ref Range Status   12/20/2023 03:45 AM 52 0 - 154 U/L Final            Blood Culture Hold   Date Value Ref Range Status   04/15/2023 Collected  Final       Blood Culture Hold   Date Value Ref Range Status   04/15/2023 Collected  Final       SURGICAL  "PATHOLOGY CONSULTATION      FINAL DIAGNOSIS:    A. Left ischium bone:         Bone fragment with degenerative changes and focal acute          osteomyelitis         Negative for malignancy                                        Diagnosis performed by:                                      ARMAND CASAREZ DO  ------------------------------------------------------------------------  ---------------------------------------------------------------  CODES: 2002x1  2205x1    SPECIMEN(S)  A. Left ischium bone:    PERTINENT CLINICAL INFORMATION:  Infected wound (T14.8 XXA, L8.9)     GROSS DESCRIPTION:  A. Received in formalin labeled with the patient's name, medical record  number, and \"left ischium\" are 2 irregular calcified fragments of  tissue measuring 0.2-0.7 cm.  Decalcified in HCl. TS 1 /[id number] [facility]    MICROSCOPIC DESCRIPTION:  Microscopic examination was performed.  Please see diagnosis.    Report electronically signed by:  ARMAND CASAREZ DO ,  Diagnosis performed at: [facility]  Pathology  Department, [address] [place] [address]  [id number]      Printed 00/00/0000  [id number] NICHOLAS CASEY   Page 1 of 1 MRN#:[id number]      Specimen Collected: 06/05/24  1:35 PM Last Resulted: 06/07/24 10:02 AM      ntains abnormal data CULTURE TISSUE W/ GRM STAIN  Order: [id number]   Status: Final result       Visible to patient: Yes (seen)       Next appt: Today at 04:30 PM in Infectious Diseases (Tin Ascencio A.P.R.N.)       Dx: Infected wound    Specimen Information: Bone   0 Result Notes      Component 2 wk ago   Significant Indicator POS Positive (POS)   Source BONE   Site LEFT ISCHIUM   Culture Result - Abnormal    Gram Stain Result Few Gram negative rods.   Culture Result  Abnormal   Escherichia coli  Moderate growth    Culture Result  Abnormal   Proteus mirabilis ESBL  Moderate growth  Extended Spectrum Beta-lactamase (ESBL) isolated.  ESBL's may be clinically resistant to therapy " with  Penicillins,Cephalosporins or Aztreonam despite  apparent in vitro susceptibility to some of these agents.  The patient requires contact isolation.  Please contact pharmacy or an Infectious Disease Specialist  if you have any questions about appropriate therapy.    Culture Result  Abnormal   Beta Streptococcus Group G  Light growth    Resulting Agency M        Susceptibility     Escherichia coli Proteus mirabilis esbl     ROSE ROSE     Amoxicillin/CA <=8/4 mcg/mL Sensitive       Ampicillin <=8 mcg/mL Sensitive >16 mcg/mL Resistant     Ampicillin/sulbactam <=4/2 mcg/mL Sensitive 8/4 mcg/mL Sensitive     Cefazolin <=2 mcg/mL Sensitive >16 mcg/mL Resistant     Cefepime <=2 mcg/mL Sensitive >16 mcg/mL Resistant     Ceftriaxone <=1 mcg/mL Sensitive >32 mcg/mL Resistant     Cefuroxime <=4 mcg/mL Sensitive >16 mcg/mL Resistant     Ciprofloxacin <=0.25 mcg/mL Sensitive 1 mcg/mL Resistant     Ertapenem <=0.5 mcg/mL Sensitive <=0.5 mcg/mL Sensitive     Gentamicin <=2 mcg/mL Sensitive <=2 mcg/mL Sensitive     Levofloxacin <=0.5 mcg/mL Sensitive 1 mcg/mL Intermediate     Minocycline <=4 mcg/mL Sensitive >8 mcg/mL Resistant     Moxifloxacin <=2 mcg/mL Sensitive >4 mcg/mL Resistant     Pip/Tazobactam <=8 mcg/mL Sensitive <=8 mcg/mL Sensitive     Tigecycline <=2 mcg/mL Sensitive       Tobramycin <=2 mcg/mL Sensitive <=2 mcg/mL Sensitive     Trimeth/Sulfa <=0.5/9.5 m... Sensitive >2/38 mcg/mL Resistant                    Narrative  Performed by: DORY  Left Ischium      Specimen Collected: 06/05/24  1:35 PM Last Resulted: 06/08/24  2:41 PM                            Impression/Assessment      1. Frequent UTI  CBC WITH DIFFERENTIAL      2. Pressure ulcers of skin of multiple topographic sites        3. History of infection due to extended spectrum beta lactamase (ESBL) producing bacteria        4. Colonized with infectious organism            Osvaldo Pate is a 73 y.o. male with a history of known C5-7 paraplegia,  neurogenic bladder with suprapubic catheter, history of stage IV sacral decubitus ulcer complicated by sacral osteomyelitis with abscess, completed therapy in 2019, decubitus ulcers and chronic ankle ulcer.   6 weeks of IV antibiotics for sacral osteomyelitis in 3/23/22. He was hospitalized from 4/22 until 4/27/2022 and underwent surgery with Dr. Raman on 4/26 for irrigation and debridement of multiple compartments of the left lower extremity, bone excision, and complex closure of chronic wound using biologic skin substitute.  Patient also with history of ESBL infection in urine noted in 4/2023. s/p debridement necrotic muscle and bone 12/12/23. Operative cultures 12/12/23 ESBL Proteus (also fluoroquinolone and Bactrim resistant), pan susceptible Enterococcus, Prevotella. Completed 6 weeks of IV ertapenem  + IV daptomycin on 1/22/2024.  Patient continued to follow-up with wound care on a regular basis.  Most recently on 6/5/2024 patient followed up with wound care and was noted to have Left ischial wound with exposed bone and with few small bone fragments and some slough. He does report copious drainage from left ischial wound.  Wound team obtained culture and also sent a small dislodged component of bone to pathology.  Pathology results positive for acute osteomyelitis, negative for malignancy. Cultures +  Proteus mirabilis esbl, Escherichia coli (pan sensitivity), and group G strep.  Referred to ID clinic for antibiotic therapy. IV ertapenem 1 g every 24 hours, 6-week course with end date tentatively for 8/7/2024       Patient referred back to ID by urology.  Patient follow-up with urology on 8/29/24 and 9/9/2024 due to chronic suprapubic catheter.  Suprapubic catheter was exchanged in office as catheter was not draining and there was complete blockage.  History of neurogenic bladder with frequent UTIs (MRSA, Pseudomonas) and kidney stones.  It was reported it was difficult to know if patient was truly  symptomatic due to history of paraplegia.  Due to urine retention urine sample was sent to lab during catheter exchange.  Urine culture positive for ESBL Proteus Mirabilis.  Urine dip positive for blood and positive for nitrates and leukocytes    9/18/24-patient referred back to [facility] due to positive urine culture for ESBL Proteus which has grown in other urine cultures in the past.  Given patient has chronic suprapubic catheter most likely colonized.  Multiple urine cultures since 2023 have been positive for the same bacteria. At the time of visit the urine culture is nearly 20 days old and today in clinic patient is not showing any signs or symptoms of infection.  Vital signs stable, he denies any recent fevers, chills, body shakes.  Due to his history of paraplegia it is difficult to diagnose with dysuria or bladder spasm.  There is sediment in his Cazares catheter drainage bag with no blood and clear yellow urine.  Patient states he has been on chronic Methenamine Hippurate 1g BID. Methenamine is a drug that stops the growth of bacteria in urine. This medication also contains an ingredient that helps to make the urine acidic.  Given that patient is asymptomatic today we will hold off on starting IV antibiotic therapy.  Repeat labs CBC plus differential for evaluation.  If leukocytosis then may possibly start treatment.  ED precautions discussed.      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  Asymptomatic today.  Vital signs stable.    -No indications to start antibiotic treatment at this time  -Repeat labs CBC plus differential.  If elevation in leukocytosis may possibly start IV antibiotic therapy   -Education provided on S/S of infection and when to report to ER/ call 911   -Continuing weekly follow-up with wound care    Return visit: 1 week . Follow up with  primary care physician for chronic medical problems      I have performed a physical exam,  updated ROS and plan today. I have reviewed previous images, labs, and provider notes.      WESTON Pennington.    All Patients should seek medical re-evaluation or report to the ER for new, increasing or worsening symptoms. In some circumstances medical conditions can change from the initial evaluation and may require emergent medical re-evaluation. This includes but is not limited to chest pain, shortness of breath, atypical abdominal pain, atypical headache, ALOC, fever >101, low blood pressure, high respiratory rate (above 30), low oxygen saturation (below 90%), acute delirium, abnormal bleeding, inability to tolerate any intake, weakness on one side of the body, any worsened or concerning conditions.    Please note that this dictation was created using voice recognition software. I have worked with technical experts from [facility]  [employer] to optimize the interface.  I have made every reasonable attempt to correct obvious errors, but there may be errors of grammar and possibly content that I did not discover before finalizing the note.

## 2024-09-18 NOTE — PATIENT INSTRUCTIONS
-Keep your wound dressing clean, dry, and intact.     -Wound vac may not have any drainage in tube or cannister & it will still be working.   Change cannister if it does become full by pressing tab on side of machine to remove canister and snap on new one. Full canister can be thrown in the trash. If cannister fills with bright red blood - go to ER. Dressing will be changed every MWF at the wound clini.  If you are having issues with your wound VAC, please consider patching leaks, changing the canister, or calling [phone] for troubleshooting. If the wound VAC has been off or un-operational for over 2 hours, call wound care center to inform them and remove all dressings including black foam and replace with normal saline damp gauze.     -Should you experience any significant changes in your wound(s), such as infection (redness, swelling, localized heat, increased pain, fever > 101 F, chills) or have any questions regarding your home care instructions, please contact the wound center at [phone]. If after hours, contact your primary care physician or go to the hospital emergency room.

## 2024-09-18 NOTE — PROGRESS NOTES
Provider Encounter- Pressure Injury        HISTORY OF PRESENT ILLNESS  Wound History:    START OF CARE IN CLINIC: 1/31/2024 (return to clinic after hospitalization)    REFERRING PROVIDER: Racquel York       WOUNDS-superior coccyx pressure injury, stage IV-resolved                                 Coccyx pressure injury, stage IV-resolved           Inferior coccyx pressure injury, stage IV resolved                                 Right ischial pressure injury, stage IV                                 Left heel pressure injury, recurring stage III-resolved                                 Left posterior lower leg/calf-stage III-resolved                                 Left medial lower leg surgical wound dehiscence-resolved, reopens frequently-resolved            Left ischial pressure injury, stage IV-first observed in clinic 5/1/2024          HISTORY: Patient with history of incomplete quadriplegia referred to [facility] for treatment of a stage IV pressure injury.  He has a history of previous pressure injuries to this area, and underwent muscle flaps in 2019, and then again in 2020.  He was seen in the wound clinic in November 2021 for an ulcer proximal from his current ulcer, and pressure injuries to his left posterior lower leg and left heel.  At that time, it was discovered that the patient had retained VAC foam embedded in the wound bed of the sacral wound.  Attempts were made to get him back to his plastic surgeon, though unsuccessful.  In January he underwent surgical removal of VAC sponge along with excisional debridement of his sacral wound by Dr. Chaves.  After the surgery, his wound went on to heal without incident.   In early April 2022, his home health nurse noted a new sacral ulcer, below the previous ulcer which quickly tripled in size over the following weeks.  The ulcer to his left medial lower leg had also deteriorated, with bone visible at the base..  He was hospitalized from 4/22 until 4/27/2022 and  underwent surgery with Dr. Raman on 4/26 for irrigation and debridement of multiple compartments of the left lower extremity, bone excision, and complex closure of chronic wound using biologic skin substitute.   His sacrococcygeal wound was not surgically addressed during this admission.  He was discharged back to his group home, with home health, and referral to outpatient wound clinic for his sacral wound.  He was instructed to follow-up with his surgeon for his lower leg wound.       Postoperatively, the left medial lower leg incision dehisced.  He was seen by his surgeon at [facility] on 5/11.  The surgeon opted to leave remaining sutures in place, and refer him to the wound clinic for treatment of this wound.   Treatment of this wound was initiated in clinic on 5/12.  During this visit was also noted that his heel DTI had resolved, but that he had a new pressure injury to his left posterior lower extremity.     A new pressure injury was noted to patient's right upper buttock/lower back on 5/20/2022.  Wound was linear in shape, skin discolored but intact.     Abrasion noted to left anterior lower leg.  First observed in clinic on 7/22/2022.  Patient states he bumped his leg into his food tray.     Small DTI noted to patient's left lateral lower leg on 7/29/2022.  Skin intact but discolored.     Large area of deep tissue injury noted to patient's left exterior lower leg.  Patient denied any trauma to this area.  Skin intact.  Wound documented.    1/27/2023: Patient was [place] [facility] [place]1/25/2023 with gross hematuria. He underwent RICHARD which showed watchman device was in place and he was taken off of Xarelto. While hospitalized wound team was consulted. He was referred back to [facility] and home health upon discharge.    Patient was hospitalized at [facility] for pyelonephritis from 2/26 until 3/2/2023, admitted for fever and general malaise.  He was admitted and initially started on linezolid and meropenem for suspected  UTI and history of multidrug-resistant organisms.  Urine cultures were negative. ID was consulted, recommended CT of chest and abdomen,which were negative for acute findings. However, he was treated with 5 days total course of antibiotics for suspected UTI, and symptoms completely resolved.  During this admission, the inpatient wound team was consulted for treatment of his sacral and lower leg wounds.  A wound culture was taken from his left heel pressure ulcer, negative.  Once stabilized, he was discharged home and referred back to [facility] to resume treatment of his wounds.    Patient was hospitalized at [facility] from 12/11 until 12/23/2023, admitted for fever.  Wound infection suspected.  CT scan of abdomen and pelvis for evaluation of sacral pressure injury showed gas tracking down to the bone consistent with osteomyelitis.  He underwent I&D of right ischial ulcer (documented as buttock) with Dr. Bansal, medial tract leading to an abscess was identified.  Cavity was opened allowing it to drain into the main wound bed.  Wound VAC was placed and managed by wound team during this admission.  A bone scan of patient's left foot was also done, initially concerning for osteomyelitis.  Orthopedic surgery was consulted and did not recommend surgical intervention. ID consulted also, recommended the patient to receive IV ertapenem 1 g every 24 hours plus IV daptomycin 8 mg/kg every 24 hours through 1/22/2024.   He was discharged to [facility] on 1/23 for IV antibiotics and wound care.  From the LTAC he was discharged home on 1/22 with home health and referral back to [facility] to resume management of his wounds  .      Pertinent Medical History: Incomplete quadriplegia, history of stage IV pressure injuries, history of flap procedures to sacral pressure injuries, osteomyelitis, obesity, colostomy in place   Contributing factors: Immobility and Obesity, impaired sensation    Personal support: Attendant-staff at shelter and home health  nursing    TOBACCO USE:   Former smoker, quit in 1977.  Never used smokeless tobacco    Patient's problem list, allergies, and current medications reviewed and updated in Epic    Interval History:  Interval History thinned 7/29/2022.  Please see previous notes for complete interval history.   Interval History thinned 1/27/2023. Please see previous note for complete interval history.  Interval History thinned 3/3/2023.  Please see previous notes for complete interval history.    Interval History thinned 8/4/2023.  Please see previous notes for complete interval history.    Interval History thinned 1/31/2024.  Please see previous notes for complete interval history.    Interval History thinned 6/26/2024.  Please see previous notes for complete interval history.      6/19/2024: Clinic visit with Steve Hodges MD. Patient denies any fevers or chills. Reports he is tolerating Abx. He has appointment with ID later today to discuss OM treatment. He is tolerating wound VAC. Slight bone exposed bilaterally in ischial wounds, slightly worse.    6/26/2024 : Clinic visit with ADILSON Arthur, FNPEGGY-BC, CWDINESHN, CFTRACI.   Patient presents today with significant deterioration of his both ischial wounds, with increased depth of undermining.   He now has a PICC line, and will be starting outpatient infusions of ertapenem later today.  He states he is feeling well, denies fevers, chills, nausea, vomiting, cough or shortness of breath.  Due to deterioration of his wound, we did discuss possibly having him go over the hospital for surgical debridement and IV antibiotics.  He was hesitant to do so.  We agreed to see how he looks after a week or so IV antibiotics.  He is agreeable to minimizing time up in his wheelchair.  He also agrees to go to the emergency room immediately if he develops any signs or symptoms of infection.    7/10/2024: Clinic visit with Steve Hodges MD. Patient reports feeling in normal state of health. He missed  last appointment as he had fall out of wheelchair and was evaluated in ED. He denies any injuries from fall. Patient wounds are measuring slightly smaller. He continues on Ertapenem. Patient reports that he has appointment for seating evaluation from [facility] scheduled, but does not recall the date.    7/17/2024 : Clinic visit with ADILSON Arthur, HOLDEN, LILIYA VALERIO.   Anjum states he is feeling well.  Left ischial wound measures significantly smaller today, however measurements vary somewhat depending on pt's position.  Both wounds appear to be progressing slightly, with improving tissue quality.    7/24/2024 : Clinic visit with ADILSON Arthur, HOLDEN, IMAN, LILIYA.   Patient continues to feel well, offers no complaints.  Right ischial wound measures smaller, left ischial wound is larger.  Patient tolerating VAC without any difficulty.  Home health continues to see him in between clinic visits.  He is still receiving daily infusions of IV antibiotics, will be completing these in August.    7/31/2024 : Clinic visit with ADILSON Arthur, HOLDEN, IMAN, LILIYA.   Anjum continues to feel well, his wounds are slowly progressing.  Tolerating VAC.  He is on IV antibiotics for 1 more week.  Admits that he is up in his wheelchair for 10 to 14 hours/day.    8/7/2024 : Clinic visit with ADILSON Arthur FNP-BC, LILIYA VALERIO.   Anjum states he is feeling well today, offers no complaints.  Both wounds measure a bit smaller today, new granulation tissue noted.  He will be getting his last IV antibiotic infusion today.    8/14/2024 : Clinic visit with ADILSON Arthur FNP-BC, IMAN, LILIYA.   Patient continues to feel well.  He has completed his antibiotics, followed up with ID earlier this week, no further antibiotic therapy at this time.  Ischial wounds are slowly progressing.  Lower extremity wounds remain resolved.    8/21/2024 : Clinic visit with ADILSON Arthur FNP-BC, LILIYA VALERIO.   Anjum states he is feeling  well, offers no complaints.  His wounds are slowly progressing, increased granulation.    8/28/2024 : Clinic visit with ADILSON Arthur, HOLDEN, IMAN, LILIYA.   Anjum states he is feeling well overall.  His wounds measure slightly smaller.  He has had some urinary symptoms, reports leakage from around his suprapubic catheter last night, and excessively full urine bag.  He has been in contact with his urologist office.  He also mentions that he has a recurring small scab to his nose, states that it falls off only to return shortly after.  This has been going on for several months.  I offered to refer him to dermatology.    9/11/2024 : Clinic visit with ADILSON Arthur, HOLDEN, IMAN, LILIYA.   Anjum states he is feeling well.  He missed his appointment last week due to car troubles.  His vehicle is now running well.  Ischial wounds show some improvement, increased granulation tissue.  He does have a small reopening of left posterior lower leg wound, appears to be a small abrasion over area of scar tissue.    9/18/2024: Clinic visit with Steve Hodges MD. Patient reports doing ok. Denies any acute issues with wound VAC. Reports that he was fitted by [employer] for new seat cushion and is being manufactured, he is not sure when will be ready. Patient's wounds appears slightly improved. Left posterior leg wound remains open.    REVIEW OF SYSTEMS:   Unchanged from previous wound clinic assessment on 9/11/2024, except as noted in interval history above.      PHYSICAL EXAMINATION:   /70   Pulse 71   Temp 36.6 °C (97.8 °F) (Temporal)   Resp 18   Physical Exam  Constitutional:       Appearance: He is obese.   Cardiovascular:      Rate and Rhythm: Normal rate.   Pulmonary:      Effort: Pulmonary effort is normal.   Abdominal:      Comments: Colostomy left lower quadrant   Genitourinary:     Comments: Suprapubic catheter to down drain   Skin:     Comments: Stage IV pressure injury to right ischium: Wound slightly  improved, Scattered areas of slough to wound bed.  Evidence of new granulation tissue.  Moderate serosanguineous drainage, no odor.  Periwound intact without erythema or induration    Stage IV pressure injury of left ischium: Wound slightly improved. Continues to have increased granulation tissue.  Moderate serosanguineous drainage, no odor.  Periwound intact without erythema or induration.    Open wound posterior left leg wound: Wound area waxes and wanes, known underlying treated OM.    All other wounds remain healed, high risk for recurrence     Neurological:      Mental Status: He is alert and oriented to person, place, and time.   Psychiatric:         Mood and Affect: Mood normal.       WOUND ASSESSMENT  Wound 12/12/23 Pressure Injury Ischium Right (Active)   Wound Image   09/18/24 1500   Site Assessment Pink;Red;Yellow;Early/partial granulation 09/18/24 1500   Periwound Assessment Intact;Fragile;Scar tissue 09/18/24 1500   Margins Unattached edges 09/18/24 1500   Drainage Amount Moderate 09/18/24 1500   Drainage Description Serosanguineous 09/18/24 1500   Treatments Cleansed;Provider debridement;Site care 09/18/24 1500   Wound Cleansing Hypochlorus Acid 09/18/24 1500   Periwound Protectant No-sting Skin Prep;Drape 09/18/24 1500   Dressing Changed Changed 09/18/24 1500   Dressing Cleansing/Solutions Not Applicable 09/18/24 1500   Dressing Options Wound Vac;Offloading Dressing - Sacral 09/18/24 1500   Dressing Change/Treatment Frequency Monday, Wednesday, Friday, and As Needed 09/18/24 1500   Wound Team Following Weekly 06/05/24 1300   WOUND NURSE ONLY - Pressure Injury Stage 4 09/18/24 1500   Non-staged Wound Description Not applicable 05/08/24 1500   Wound Length (cm) 3.5 cm 09/18/24 1500   Wound Width (cm) 2.5 cm 09/18/24 1500   Wound Depth (cm) 1 cm 09/18/24 1500   Wound Surface Area (cm^2) 8.75 cm^2 09/18/24 1500   Wound Volume (cm^3) 8.75 cm^3 09/18/24 1500   Post-Procedure Length (cm) 3.7 cm 09/18/24 1500    Post-Procedure Width (cm) 2.5 cm 09/18/24 1500   Post-Procedure Depth (cm) 1 cm 09/18/24 1500   Post-Procedure Surface Area (cm^2) 9.25 cm^2 09/18/24 1500   Post-Procedure Volume (cm^3) 9.25 cm^3 09/18/24 1500   Wound Healing % 84 09/18/24 1500   Tunneling (cm) 0 cm 09/18/24 1500   Tunneling Clock Position of Wound 2 07/10/24 1615   Undermining (cm) 3.5 cm 09/18/24 1500   Undermining of Wound, 1st Location From 12 o'clock;To 5 o'clock 09/18/24 1500   Undermining (cm) - 2nd location 0 cm 09/18/24 1500   Undermining of Wound, 2nd Location From 12 o'clock;To 2 o'clock 08/14/24 1545   Wound Odor Mild 09/18/24 1500   Exposed Structures Adipose 09/18/24 1500   Number of days: 282       Wound 05/01/24 Pressure Injury Ischium Left (Active)   Wound Image   09/18/24 1500   Site Assessment Pink;Red;Yellow;Early/partial granulation 09/18/24 1500   Periwound Assessment Denuded;Scar tissue 09/18/24 1500   Margins Unattached edges 09/18/24 1500   Closure Secondary intention 09/18/24 1500   Drainage Amount Large 09/18/24 1500   Drainage Description Serosanguineous 09/18/24 1500   Treatments Cleansed;Provider debridement;Site care 09/18/24 1500   Wound Cleansing Dakin's Solution 09/18/24 1500   Periwound Protectant No-sting Skin Prep;Hydrofiber;Drape 09/18/24 1500   Dressing Status Intact 05/01/24 1600   Dressing Changed Changed 09/18/24 1500   Dressing Cleansing/Solutions Not Applicable 09/18/24 1500   Dressing Options Wound Vac;Offloading Dressing - Sacral;Other (Comments) 09/18/24 1500   Dressing Change/Treatment Frequency Monday, Wednesday, Friday, and As Needed 09/18/24 1500   Wound Team Following Weekly 09/18/24 1600   WOUND NURSE ONLY - Pressure Injury Stage 4 09/18/24 1600   Non-staged Wound Description Not applicable 09/18/24 1600   Wound Length (cm) 5.5 cm 09/18/24 1600   Wound Width (cm) 3 cm 09/18/24 1600   Wound Depth (cm) 2.8 cm 09/18/24 1600   Wound Surface Area (cm^2) 16.5 cm^2 09/18/24 1600   Wound Volume (cm^3)  46.2 cm^3 09/18/24 1600   Post-Procedure Length (cm) 5.6 cm 09/18/24 1600   Post-Procedure Width (cm) 3 cm 09/18/24 1600   Post-Procedure Depth (cm) 2.8 cm 09/18/24 1600   Post-Procedure Surface Area (cm^2) 16.8 cm^2 09/18/24 1600   Post-Procedure Volume (cm^3) 47.04 cm^3 09/18/24 1600   Wound Healing % -[id number] 09/18/24 1600   Tunneling (cm) 0 cm 09/18/24 1600   Undermining (cm) 2.7 cm 09/18/24 1600   Undermining of Wound, 1st Location From 9 o'clock;To 5 o'clock 09/18/24 1600   Wound Odor Mild 09/18/24 1600   Exposed Structures Adipose;Fascia 09/18/24 1600   Number of days: 141       Wound 09/11/24 Full Thickness Wound Leg Posterior;Lower Left (Active)   Wound Image   09/18/24 1500   Site Assessment Pink;Red 09/18/24 1500   Periwound Assessment Fragile;Scar tissue 09/18/24 1500   Margins Undefined edges 09/18/24 1500   Drainage Amount Moderate 09/18/24 1500   Drainage Description Serosanguineous 09/18/24 1500   Treatments Cleansed;Site care 09/18/24 1500   Offloading/DME Other (comment) 09/18/24 1500   Wound Cleansing Hypochlorus Acid 09/18/24 1500   Periwound Protectant No-sting Skin Prep;Moisture Barrier 09/18/24 1500   Dressing Status Clean;Dry;Intact 09/11/24 1600   Dressing Changed Changed 09/18/24 1500   Dressing Cleansing/Solutions Not Applicable 09/18/24 1500   Dressing Options Hydrofiber Silver;Offloading Dressing - Sacral 09/18/24 1500   Dressing Change/Treatment Frequency Every 72 hrs, and As Needed 09/18/24 1500   Wound Team Following Weekly 09/18/24 1500   Non-staged Wound Description Full thickness 09/18/24 1500   Wound Length (cm) 0.9 cm 09/18/24 1500   Wound Width (cm) 1 cm 09/18/24 1500   Wound Depth (cm) 0.2 cm 09/18/24 1500   Wound Surface Area (cm^2) 0.9 cm^2 09/18/24 1500   Wound Volume (cm^3) 0.18 cm^3 09/18/24 1500   Wound Healing % 40 09/18/24 1500   Tunneling (cm) 0 cm 09/18/24 1500   Undermining (cm) 0 cm 09/18/24 1500   Wound Odor None 09/18/24 1500   Exposed Structures None 09/18/24 1500  "  Number of days: 8       PROCEDURE: Excisional debridement of right and left ischial wounds  -2% viscous lidocaine applied topically to wound bed for approximately 5 minutes prior to debridement  -Curette used to debride wound bed.  Excisional debridement was performed to remove devitalized tissue until healthy, bleeding tissue was visualized.   Entire surface of wound, 26.05 cm2 debrided.  Tissue debrided into the muscle / fascia layer.    -Bleeding controlled with manual pressure.    -Wound care completed by wound RN, refer to flowsheet  -Patient tolerated the procedure well, without c/o pain or discomfort.    -No excisional debridement of left posterior leg wound required    Pertinent Labs and Diagnostics:    Labs:     A1c: No results found for: \"HBA1C\"     [facility] results, 7/1/2022 (under media tab)    CRP 13    ESR 31      IMAGING:     X-ray left tib-fib ordered 2/16/2024 through [facility]    12/11/2023-CT of abdomen pelvis with contrast  IMPRESSION:   1.  Right ischial decubitus ulcer extending to bone with soft tissue gas tracking along the right perineum. Appearance suggesting osteomyelitis, consider component of necrotizing fasciitis as clinically appropriate.  2.  Small pericardial effusion  3.  Left adrenal nodule, density on prior noncontrast CT demonstrates adenoma.  4.  Hepatomegaly  5.  Enlarged prostate, workup and evaluation for causes of prostate enlargement recommended as clinically appropriate.  6.  Atherosclerosis and atherosclerotic coronary artery disease    12/15/2023-bone scan of left foot  IMPRESSION:     1.  Mild increased activity in the LEFT 1st and 3rd toes on blood pool and delayed images possibly indicating inflammation/infection.  2.  No significant blood flow asymmetry.          VASCULAR STUDIES: No results found.    LAST  WOUND CULTURE:   Lab Results   Component Value Date/Time    CULTRSULT - (A) 09/12/2024 12:00 PM    CULTRSULT (A) 09/12/2024 12:00 PM     Proteus " mirabilis ESBL  >100,000 cfu/mL  Extended Spectrum Beta-lactamase (ESBL) isolated.  ESBL's may be clinically resistant to therapy with  Penicillins,Cephalosporins or Aztreonam despite  apparent in vitro susceptibility to some of these agents.  The patient requires contact isolation.  Please contact pharmacy or an Infectious Disease Specialist  if you have any questions about appropriate therapy.        PATHOLOGY  2/17/2023-bone fragment extracted from left lower extremity wound\\  FINAL DIAGNOSIS:     A. Left leg bone fragment at base of chronic wound:          Extensively degenerated fibrocartilaginous tissue with a rim of           fibrinopurulent debris          Correlate with culture findings            Comment: While no residual intact bone is identified, these           findings are suggestive of adjacent osteomyelitis.      ASSESSMENT AND PLAN:     1. Sacral decubitus ulcer, stage IV ([facility])  Comments: Ulcer first noted in early April 2022 as small open area which quickly enlarged.  This ulcer is present distal from previous sacral ulcer which healed after surgery in January.  Patient has history of flap reconstruction x2 to this area.    9/18/2024: Remains resolved, high risk for recurrence  -Patient does spend a lot of time up in his wheelchair, and wishes to continue to do so for his quality of life.  He lives independently, drives, and is involved with family activities.  He does have a [employer] wheelchair cushion, suspect may be inadequate. Has been referred to [facility] for repeat seating evaluation.  Evaluation is scheduled for sometime in September  - Known OM that was previously treated. CT scan done during recent hospitalization did not show OM of sacrum, however OM of the ischium noted.  -Monitor site each clinic visit  -Patient is very well versed in pressure relief strategies    Wound care: Silicone foam pressure relieving dressing    2. Pressure injury of right ischium, stage 4 ([facility])  Comments: Abscess  and OM found on CT during hospitalization in December 2023.  Patient underwent I&D with VAC placement.  IV antibiotics through 1/22/2024.    9/18/2024: Wounds have slightly improved, slow incremental improvement.  - Excisional debridement of nonviable tissue from wound in clinic today, medically necessary to promote wound healing  -Home health has been instructed to continue Dakins soaked gauze to wound for prior to placement of new VAC dressing as needed for odor / slough.   -Continue wound VAC to right ischial wound  -Patient to return to clinic weekly for assessment and debridement  -Home health to change dressing 2 times per week in between clinic visits.  Dakin's soaks for 15 minutes each dressing change  -Patient is very well versed on pressure relief measures, has adequate surface on bed, alternating low air loss.  -Seating eval ordered through [employer] has been scheduled, he does not recall when.    Wound care:  NPWT at -125 mmHg to accelerate granulation, change 3 times per week, sacral offloading foam.    3. Pressure injury of left ischium, stage 4 ([facility])    9/18/2024: Wound measures smaller today.  As previously noted, measurements may vary based on patient's position.  New granulation tissue is noted.  Scattered areas of slough  - Excisional debridement of wound in clinic today, medically necessary to promote wound healing.  - Continue wound VAC  - Patient to return to clinic weekly for assessment and debridement  - Continue NPWT    Wound care: NPWT at -125 mmHg to accelerate granulation, change 3 times per week, sacral offloading foam.    4. Other acute osteomyelitis, other site ([facility])    9/18/2024: Bone fragments removed during clinic visit in June were sent for pathology, polymicrobial, most concerning was an MDR ESBL Proteus.   -Patient completed IV antibiotics.  No further antibiotics at this time per ID  -Patient understands he has a very low threshold for infection/sepsis.  He understands he is to  go to the emergency room immediately if he begins to experience fevers, chills, nausea or vomiting, or if he notices radiating erythema or purulent drainage from his wounds.  -ID following    5. Postoperative wound dehiscence, subsequent encounter  6. Osteomyelitis of left lower extremity (HCC)  Comments: On 4/26/2022 patient underwent irrigation and debridement of multiple compartments of the left lower extremity states for pressure injury, with bone excision, and complex closure of chronic wound using biologic skin substitute.  During postop visit in surgeons office on 5/11, surgical site was noted to be dehisced.      9/18/2024: Wound has reoccurred.  - Wound waxes and wanes due to pressure and underlying known chronic OM  -No excisional debridement of these wounds required today  -Patient to be mindful of offloading when supine, should assure that his leg is not rotating medially  -Monitor site each clinic visit  Wound care: Silicone foam dressing, Tubigrip D    7. Pressure injury of left foot, stage 4 (HCC)  8. Pressure injury of left leg, stage 3 (HCC)  9. Pressure injury of left heel, stage 3 (HCC)    9/18/2024: All of these wounds remain resolved though at high risk for recurrence  -Monitor pressure points each clinic visit  - Patient aware of offloading.    10. Quadriplegia, C5-C7 incomplete (HCC)  Comments: Complicating factor.  Impaired mobility and sensation  -Patient is still spending 7-8 hours/day up in his wheelchair, he has Roho cushion nearly 5 years old, and knows to reposition frequently.  Wears heel float boots bilaterally at all times  - Patient reports that he has seating evaluation with [employer] upcoming.    Please note that this note may have been created using voice recognition software. I have worked with technical experts from [email or web address] to optimize the interface.  I have made every reasonable attempt to correct obvious errors, but there may be errors of grammar and possibly content  that I did not discover before finalizing the note.

## 2024-09-18 NOTE — PROGRESS NOTES
RN Wound Care Procedures 9/18/2024:  Wound vac reapplied to left and right ischium wounds using four pieces of black simplace granufoam (One piece into each wound and to fill undermining, two pieces to bridge left ischium wound vac foam to the right flank). Negative pressure wound therapy resumed at 125 mmHg continuous pressure, no leaks noted.    Updated wound care order routed to [facility] via [facility].

## 2024-09-20 ENCOUNTER — TELEPHONE (OUTPATIENT)
Dept: INFECTIOUS DISEASES | Facility: MEDICAL CENTER | Age: 74
End: 2024-09-20
Payer: MEDICARE

## 2024-09-20 ENCOUNTER — HOSPITAL ENCOUNTER (OUTPATIENT)
Dept: LAB | Facility: MEDICAL CENTER | Age: 74
End: 2024-09-20
Attending: NURSE PRACTITIONER
Payer: MEDICARE

## 2024-09-20 DIAGNOSIS — N39.0 FREQUENT UTI: ICD-10-CM

## 2024-09-20 LAB
BASOPHILS # BLD AUTO: 0.3 % (ref 0–1.8)
BASOPHILS # BLD: 0.02 K/UL (ref 0–0.12)
EOSINOPHIL # BLD AUTO: 0.16 K/UL (ref 0–0.51)
EOSINOPHIL NFR BLD: 2.6 % (ref 0–6.9)
ERYTHROCYTE [DISTWIDTH] IN BLOOD BY AUTOMATED COUNT: 59.3 FL (ref 35.9–50)
HCT VFR BLD AUTO: 41.3 % (ref 42–52)
HGB BLD-MCNC: 12.9 G/DL (ref 14–18)
IMM GRANULOCYTES # BLD AUTO: 0.04 K/UL (ref 0–0.11)
IMM GRANULOCYTES NFR BLD AUTO: 0.6 % (ref 0–0.9)
LYMPHOCYTES # BLD AUTO: 1.18 K/UL (ref 1–4.8)
LYMPHOCYTES NFR BLD: 19 % (ref 22–41)
MCH RBC QN AUTO: 31.4 PG (ref 27–33)
MCHC RBC AUTO-ENTMCNC: 31.2 G/DL (ref 32.3–36.5)
MCV RBC AUTO: 100.5 FL (ref 81.4–97.8)
MONOCYTES # BLD AUTO: 0.66 K/UL (ref 0–0.85)
MONOCYTES NFR BLD AUTO: 10.6 % (ref 0–13.4)
NEUTROPHILS # BLD AUTO: 4.16 K/UL (ref 1.82–7.42)
NEUTROPHILS NFR BLD: 66.9 % (ref 44–72)
NRBC # BLD AUTO: 0 K/UL
NRBC BLD-RTO: 0 /100 WBC (ref 0–0.2)
PLATELET # BLD AUTO: 167 K/UL (ref 164–446)
PMV BLD AUTO: 8.7 FL (ref 9–12.9)
RBC # BLD AUTO: 4.11 M/UL (ref 4.7–6.1)
WBC # BLD AUTO: 6.2 K/UL (ref 4.8–10.8)

## 2024-09-20 PROCEDURE — 36415 COLL VENOUS BLD VENIPUNCTURE: CPT

## 2024-09-20 PROCEDURE — 85025 COMPLETE CBC W/AUTO DIFF WBC: CPT

## 2024-09-20 NOTE — TELEPHONE ENCOUNTER
Called and discussed most recent labs with patient via telephone from 9/20/2024.  WBC 6.2.  No signs of active infection.  Patient denies any signs of infection.  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  Patient agreement with plan and will continue follow-up with urology.  He has an upcoming urology procedure and they are recommending a one-time antibiotic.  Recommend that he discuss this one-time antibiotic with urology team as this may be possibly a standard of care

## 2024-09-23 ENCOUNTER — TELEPHONE (OUTPATIENT)
Dept: WOUND CARE | Facility: MEDICAL CENTER | Age: 74
End: 2024-09-23
Payer: MEDICARE

## 2024-09-23 NOTE — TELEPHONE ENCOUNTER
Anjum called as [facility] needed updated order he stated. Went to [employer] and updated visit note from 9.18.24 with measurements. Sent inquiry to REINALDO Thayer for prescription requirements and to send new if required. .

## 2024-09-25 ENCOUNTER — APPOINTMENT (OUTPATIENT)
Dept: INFECTIOUS DISEASES | Facility: MEDICAL CENTER | Age: 74
End: 2024-09-25
Attending: NURSE PRACTITIONER
Payer: MEDICARE

## 2024-09-25 ENCOUNTER — OFFICE VISIT (OUTPATIENT)
Dept: WOUND CARE | Facility: MEDICAL CENTER | Age: 74
End: 2024-09-25
Payer: MEDICARE

## 2024-09-25 VITALS
DIASTOLIC BLOOD PRESSURE: 82 MMHG | RESPIRATION RATE: 20 BRPM | TEMPERATURE: 98.3 F | HEART RATE: 60 BPM | SYSTOLIC BLOOD PRESSURE: 130 MMHG

## 2024-09-25 DIAGNOSIS — L89.314 PRESSURE INJURY OF RIGHT ISCHIUM, STAGE 4 (HCC): ICD-10-CM

## 2024-09-25 DIAGNOSIS — L89.894 PRESSURE INJURY OF LEFT FOOT, STAGE 4 (HCC): ICD-10-CM

## 2024-09-25 DIAGNOSIS — L89.893 PRESSURE INJURY OF LEFT LEG, STAGE 3 (HCC): ICD-10-CM

## 2024-09-25 DIAGNOSIS — L89.324 PRESSURE INJURY OF LEFT ISCHIUM, STAGE 4 (HCC): Primary | ICD-10-CM

## 2024-09-25 DIAGNOSIS — M86.18 OTHER ACUTE OSTEOMYELITIS, OTHER SITE (HCC): ICD-10-CM

## 2024-09-25 DIAGNOSIS — L89.154 SACRAL DECUBITUS ULCER, STAGE IV (HCC): ICD-10-CM

## 2024-09-25 DIAGNOSIS — M86.9 OSTEOMYELITIS OF LEFT LOWER EXTREMITY (HCC): ICD-10-CM

## 2024-09-25 DIAGNOSIS — G82.54 QUADRIPLEGIA, C5-C7, INCOMPLETE (HCC): ICD-10-CM

## 2024-09-25 DIAGNOSIS — T81.31XD POSTOPERATIVE WOUND DEHISCENCE, SUBSEQUENT ENCOUNTER: ICD-10-CM

## 2024-09-25 DIAGNOSIS — L89.623 PRESSURE INJURY OF LEFT HEEL, STAGE 3 (HCC): ICD-10-CM

## 2024-09-25 PROCEDURE — 97605 NEG PRS WND THER DME<=50SQCM: CPT

## 2024-09-25 PROCEDURE — 11043 DBRDMT MUSC&/FSCA 1ST 20/<: CPT

## 2024-09-25 PROCEDURE — 11046 DBRDMT MUSC&/FSCA EA ADDL: CPT

## 2024-09-25 NOTE — PATIENT INSTRUCTIONS
-Keep your wound dressing clean, dry, and intact. Only change dressing if it's over saturated, soiled or falls off.     -Remove your compression wrap if you have severe pain, severe swelling, numbness, color change, or temperature change in your toes. If you need to remove your compression wrap, do so by unrolling it. Do not cut the compression wrap off to prevent cutting yourself on accident.    -Wound vac may not have any drainage in tube or cannister & it will still be working.   Change cannister if it does become full by pressing tab on side of machine to remove canister and snap on new one. Full canister can be thrown in the trash. If cannister fills with bright red blood - go to ER. Dressing will be changed every MWF at the wound clini.  If you are having issues with your wound VAC, please consider patching leaks, changing the canister, or calling [phone] for troubleshooting. If the wound VAC has been off or un-operational for over 2 hours, call wound care center to inform them and remove all dressings including black foam and replace with normal saline damp gauze.     -Should you experience any significant changes in your wound(s), such as infection (redness, swelling, localized heat, increased pain, fever > 101 F, chills) or have any questions regarding your home care instructions, please contact the wound center at [phone]. If after hours, contact your primary care physician or go to the hospital emergency room.  If you are admitted to any hospital, you will need a new referral to come back to the wound clinic and any scheduled appointments that you already have, may be cancelled.

## 2024-09-26 NOTE — PROGRESS NOTES
Home health orders routed to [facility] via [employer].    Wound vac removed by CNA, wound bed inspected by this RN, no retained foam noted. Wound vac <50cm applied to bilateral ischial wounds using 2 pieces of black foam. 1 long piece of black foam to fill right ischium wound and bridge to right hip, 1 long black foam to fill left ischial wound and bridge to right hip.   Negative pressure wound therapy resumed at 125mmHg continuous pressure with no leaks noted. Sacral offloading dressings to each ischium, sacrum, and under wound vac tubing.

## 2024-09-26 NOTE — PROGRESS NOTES
Provider Encounter- Pressure Injury        HISTORY OF PRESENT ILLNESS  Wound History:    START OF CARE IN CLINIC: 1/31/2024 (return to clinic after hospitalization)    REFERRING PROVIDER: Racquel York       WOUNDS-superior coccyx pressure injury, stage IV-resolved                                 Coccyx pressure injury, stage IV-resolved           Inferior coccyx pressure injury, stage IV resolved                                 Right ischial pressure injury, stage IV                                 Left heel pressure injury, recurring stage III-resolved                                 Left posterior lower leg/calf-stage III-resolved                                 Left medial lower leg surgical wound dehiscence-resolved, reopens frequently-resolved            Left ischial pressure injury, stage IV-first observed in clinic 5/1/2024          HISTORY: Patient with history of incomplete quadriplegia referred to Health system for treatment of a stage IV pressure injury.  He has a history of previous pressure injuries to this area, and underwent muscle flaps in 2019, and then again in 2020.  He was seen in the wound clinic in November 2021 for an ulcer proximal from his current ulcer, and pressure injuries to his left posterior lower leg and left heel.  At that time, it was discovered that the patient had retained VAC foam embedded in the wound bed of the sacral wound.  Attempts were made to get him back to his plastic surgeon, though unsuccessful.  In January he underwent surgical removal of VAC sponge along with excisional debridement of his sacral wound by Dr. Chaves.  After the surgery, his wound went on to heal without incident.   In early April 2022, his home health nurse noted a new sacral ulcer, below the previous ulcer which quickly tripled in size over the following weeks.  The ulcer to his left medial lower leg had also deteriorated, with bone visible at the base..  He was hospitalized from 4/22 until 4/27/2022 and  underwent surgery with Dr. Raman on 4/26 for irrigation and debridement of multiple compartments of the left lower extremity, bone excision, and complex closure of chronic wound using biologic skin substitute.   His sacrococcygeal wound was not surgically addressed during this admission.  He was discharged back to his group home, with home health, and referral to outpatient wound clinic for his sacral wound.  He was instructed to follow-up with his surgeon for his lower leg wound.       Postoperatively, the left medial lower leg incision dehisced.  He was seen by his surgeon at [facility] on 5/11.  The surgeon opted to leave remaining sutures in place, and refer him to the wound clinic for treatment of this wound.   Treatment of this wound was initiated in clinic on 5/12.  During this visit was also noted that his heel DTI had resolved, but that he had a new pressure injury to his left posterior lower extremity.     A new pressure injury was noted to patient's right upper buttock/lower back on 5/20/2022.  Wound was linear in shape, skin discolored but intact.     Abrasion noted to left anterior lower leg.  First observed in clinic on 7/22/2022.  Patient states he bumped his leg into his food tray.     Small DTI noted to patient's left lateral lower leg on 7/29/2022.  Skin intact but discolored.     Large area of deep tissue injury noted to patient's left exterior lower leg.  Patient denied any trauma to this area.  Skin intact.  Wound documented.    1/27/2023: Patient was [place] [facility] [place]23[place]1/25/2023 with gross hematuria. He underwent RICHARD which showed watchman device was in place and he was taken off of Xarelto. While hospitalized wound team was consulted. He was referred back to [facility] and home health upon discharge.    Patient was hospitalized at [facility] for pyelonephritis from 2/26 until 3/2/2023, admitted for fever and general malaise.  He was admitted and initially started on linezolid and meropenem for suspected  UTI and history of multidrug-resistant organisms.  Urine cultures were negative. ID was consulted, recommended CT of chest and abdomen,which were negative for acute findings. However, he was treated with 5 days total course of antibiotics for suspected UTI, and symptoms completely resolved.  During this admission, the inpatient wound team was consulted for treatment of his sacral and lower leg wounds.  A wound culture was taken from his left heel pressure ulcer, negative.  Once stabilized, he was discharged home and referred back to [facility] to resume treatment of his wounds.    Patient was hospitalized at [facility] from 12/11 until 12/23/2023, admitted for fever.  Wound infection suspected.  CT scan of abdomen and pelvis for evaluation of sacral pressure injury showed gas tracking down to the bone consistent with osteomyelitis.  He underwent I&D of right ischial ulcer (documented as buttock) with Dr. Bansal, medial tract leading to an abscess was identified.  Cavity was opened allowing it to drain into the main wound bed.  Wound VAC was placed and managed by wound team during this admission.  A bone scan of patient's left foot was also done, initially concerning for osteomyelitis.  Orthopedic surgery was consulted and did not recommend surgical intervention. ID consulted also, recommended the patient to receive IV ertapenem 1 g every 24 hours plus IV daptomycin 8 mg/kg every 24 hours through 1/22/2024.   He was discharged to [facility] on 1/23 for IV antibiotics and wound care.  From the LTAC he was discharged home on 1/22 with home health and referral back to [facility] to resume management of his wounds  .      Pertinent Medical History: Incomplete quadriplegia, history of stage IV pressure injuries, history of flap procedures to sacral pressure injuries, osteomyelitis, obesity, colostomy in place   Contributing factors: Immobility and Obesity, impaired sensation    Personal support: Attendant-staff at custodial and home health  nursing    TOBACCO USE:   Former smoker, quit in 1977.  Never used smokeless tobacco    Patient's problem list, allergies, and current medications reviewed and updated in Epic    Interval History:  Interval History thinned 7/29/2022.  Please see previous notes for complete interval history.   Interval History thinned 1/27/2023. Please see previous note for complete interval history.  Interval History thinned 3/3/2023.  Please see previous notes for complete interval history.    Interval History thinned 8/4/2023.  Please see previous notes for complete interval history.    Interval History thinned 1/31/2024.  Please see previous notes for complete interval history.    Interval History thinned 6/26/2024.  Please see previous notes for complete interval history.      6/19/2024: Clinic visit with Steve Hodges MD. Patient denies any fevers or chills. Reports he is tolerating Abx. He has appointment with ID later today to discuss OM treatment. He is tolerating wound VAC. Slight bone exposed bilaterally in ischial wounds, slightly worse.    6/26/2024 : Clinic visit with ADILSON Arthur, FNPEGGY-BC, CWDINESHN, CFTRACI.   Patient presents today with significant deterioration of his both ischial wounds, with increased depth of undermining.   He now has a PICC line, and will be starting outpatient infusions of ertapenem later today.  He states he is feeling well, denies fevers, chills, nausea, vomiting, cough or shortness of breath.  Due to deterioration of his wound, we did discuss possibly having him go over the hospital for surgical debridement and IV antibiotics.  He was hesitant to do so.  We agreed to see how he looks after a week or so IV antibiotics.  He is agreeable to minimizing time up in his wheelchair.  He also agrees to go to the emergency room immediately if he develops any signs or symptoms of infection.    7/10/2024: Clinic visit with Steve Hodges MD. Patient reports feeling in normal state of health. He missed  last appointment as he had fall out of wheelchair and was evaluated in ED. He denies any injuries from fall. Patient wounds are measuring slightly smaller. He continues on Ertapenem. Patient reports that he has appointment for seating evaluation from [facility] scheduled, but does not recall the date.    7/17/2024 : Clinic visit with ADILSON Arthur, HOLDEN, LILIYA VALERIO.   Anjum states he is feeling well.  Left ischial wound measures significantly smaller today, however measurements vary somewhat depending on pt's position.  Both wounds appear to be progressing slightly, with improving tissue quality.    7/24/2024 : Clinic visit with ADILSON Arthur, HOLDEN, IMAN, LILIYA.   Patient continues to feel well, offers no complaints.  Right ischial wound measures smaller, left ischial wound is larger.  Patient tolerating VAC without any difficulty.  Home health continues to see him in between clinic visits.  He is still receiving daily infusions of IV antibiotics, will be completing these in August.    7/31/2024 : Clinic visit with ADILSON Arthur, HOLDEN, IMAN, LILIYA.   Anjum continues to feel well, his wounds are slowly progressing.  Tolerating VAC.  He is on IV antibiotics for 1 more week.  Admits that he is up in his wheelchair for 10 to 14 hours/day.    8/7/2024 : Clinic visit with ADILSON Arthur FNP-BC, LILIYA VALERIO.   Anjum states he is feeling well today, offers no complaints.  Both wounds measure a bit smaller today, new granulation tissue noted.  He will be getting his last IV antibiotic infusion today.    8/14/2024 : Clinic visit with ADILSON Arthur FNP-BC, IMAN, LILIYA.   Patient continues to feel well.  He has completed his antibiotics, followed up with ID earlier this week, no further antibiotic therapy at this time.  Ischial wounds are slowly progressing.  Lower extremity wounds remain resolved.    8/21/2024 : Clinic visit with ADILSON Arthur FNP-BC, LILIYA VALERIO.   Anjum states he is feeling  well, offers no complaints.  His wounds are slowly progressing, increased granulation.    8/28/2024 : Clinic visit with ADILSON Arthur, HOLDEN, LILIYA VALERIO.   Turk states he is feeling well overall.  His wounds measure slightly smaller.  He has had some urinary symptoms, reports leakage from around his suprapubic catheter last night, and excessively full urine bag.  He has been in contact with his urologist office.  He also mentions that he has a recurring small scab to his nose, states that it falls off only to return shortly after.  This has been going on for several months.  I offered to refer him to dermatology.    9/11/2024 : Clinic visit with ADILSON Arthur FNP-BC, LILIYA VALERIO.   Turk states he is feeling well.  He missed his appointment last week due to car troubles.  His vehicle is now running well.  Ischial wounds show some improvement, increased granulation tissue.  He does have a small reopening of left posterior lower leg wound, appears to be a small abrasion over area of scar tissue.    9/18/2024: Clinic visit with Steve Hodges MD. Patient reports doing ok. Denies any acute issues with wound VAC. Reports that he was fitted by [employer] for new seat cushion and is being manufactured, he is not sure when will be ready. Patient's wounds appears slightly improved. Left posterior leg wound remains open.    9/25/2024 : Clinic visit with ADILSON Arthur, HOLDEN, IMAN, LILIYA.   Patient states he is feeling well.  His wounds measure about the same.    REVIEW OF SYSTEMS:   Unchanged from previous wound clinic assessment on 9/18/2024, except as noted in interval history above.      PHYSICAL EXAMINATION:   /82   Pulse 60   Temp 36.8 °C (98.3 °F) (Temporal)   Resp 20   Physical Exam  Constitutional:       Appearance: He is obese.   Cardiovascular:      Rate and Rhythm: Normal rate.   Pulmonary:      Effort: Pulmonary effort is normal.   Abdominal:      Comments: Colostomy left lower quadrant    Genitourinary:     Comments: Suprapubic catheter to down drain   Skin:     Comments: Stage IV pressure injury to right ischium: Wound area about the same.  Scattered areas of slough to wound bed.  New granulation tissue noted.  Moderate serous drainage, mild odor.    Stage IV pressure injury of left ischium: Wound area about the same.  New granulation tissue noted.  Scattered areas of slough to wound bed.  Moderate serous drainage, mild odor.    Open wound posterior left leg wound: Resolved, skin intact    All other wounds remain healed, high risk for recurrence     Neurological:      Mental Status: He is alert and oriented to person, place, and time.   Psychiatric:         Mood and Affect: Mood normal.       WOUND ASSESSMENT  Wound 12/12/23 Pressure Injury Ischium Right (Active)   Wound Image    09/25/24 1625   Site Assessment Red;Pink;Yellow 09/25/24 1625   Periwound Assessment Fragile 09/25/24 1625   Margins Unattached edges 09/25/24 1625   Drainage Amount Copious 09/25/24 1625   Drainage Description Serosanguineous 09/25/24 1625   Treatments Cleansed;Provider debridement;Site care 09/25/24 1625   Wound Cleansing Hypochlorus Acid 09/25/24 1625   Periwound Protectant No-sting Skin Prep;Drape 09/25/24 1625   Dressing Changed Changed 09/18/24 1500   Dressing Cleansing/Solutions Not Applicable 09/25/24 1625   Dressing Options Wound Vac;Offloading Dressing - Sacral 09/25/24 1625   Dressing Change/Treatment Frequency Monday, Wednesday, Friday, and As Needed 09/25/24 1625   Wound Team Following Weekly 06/05/24 1300   WOUND NURSE ONLY - Pressure Injury Stage 4 09/25/24 1625   Non-staged Wound Description Not applicable 05/08/24 1500   Wound Length (cm) 3 cm 09/25/24 1625   Wound Width (cm) 2.3 cm 09/25/24 1625   Wound Depth (cm) 0.7 cm 09/25/24 1625   Wound Surface Area (cm^2) 6.9 cm^2 09/25/24 1625   Wound Volume (cm^3) 4.83 cm^3 09/25/24 1625   Post-Procedure Length (cm) 3 cm 09/25/24 1625   Post-Procedure Width (cm)  2.3 cm 09/25/24 1625   Post-Procedure Depth (cm) 0.8 cm 09/25/24 1625   Post-Procedure Surface Area (cm^2) 6.9 cm^2 09/25/24 1625   Post-Procedure Volume (cm^3) 5.52 cm^3 09/25/24 1625   Wound Healing % 91 09/25/24 1625   Tunneling (cm) 0 cm 09/25/24 1625   Tunneling Clock Position of Wound 2 07/10/24 1615   Undermining (cm) 5.5 cm 09/25/24 1625   Undermining of Wound, 1st Location From 12 o'clock;To 1 o'clock 09/25/24 1625   Undermining (cm) - 2nd location 0 cm 09/25/24 1625   Undermining of Wound, 2nd Location From 12 o'clock;To 2 o'clock 08/14/24 1545   Wound Odor Mild 09/25/24 1625   Exposed Structures Adipose 09/25/24 1625   Number of days: 288       Wound 05/01/24 Pressure Injury Ischium Left (Active)   Wound Image    09/25/24 1625   Site Assessment Red;Yellow 09/25/24 1625   Periwound Assessment Maceration 09/25/24 1625   Margins Unattached edges 09/25/24 1625   Closure Secondary intention 09/18/24 1500   Drainage Amount Copious 09/25/24 1625   Drainage Description Serosanguineous 09/25/24 1625   Treatments Cleansed;Provider debridement;Site care 09/25/24 1625   Wound Cleansing Hypochlorus Acid 09/25/24 1625   Periwound Protectant Skin Protectant Wipes to Periwound;Drape 09/25/24 1625   Dressing Status Intact 05/01/24 1600   Dressing Changed Changed 09/18/24 1500   Dressing Cleansing/Solutions Not Applicable 09/25/24 1625   Dressing Options Wound Vac;Offloading Dressing - Sacral 09/25/24 1625   Dressing Change/Treatment Frequency Monday, Wednesday, Friday, and As Needed 09/25/24 1625   Wound Team Following Weekly 09/18/24 1600   WOUND NURSE ONLY - Pressure Injury Stage 4 09/25/24 1625   Non-staged Wound Description Not applicable 09/18/24 1600   Wound Length (cm) 6 cm 09/25/24 1625   Wound Width (cm) 3.1 cm 09/25/24 1625   Wound Depth (cm) 2.5 cm 09/25/24 1625   Wound Surface Area (cm^2) 18.6 cm^2 09/25/24 1625   Wound Volume (cm^3) 46.5 cm^3 09/25/24 1625   Post-Procedure Length (cm) 6 cm 09/25/24 1625  "  Post-Procedure Width (cm) 3.1 cm 09/25/24 1625   Post-Procedure Depth (cm) 2.6 cm 09/25/24 1625   Post-Procedure Surface Area (cm^2) 18.6 cm^2 09/25/24 1625   Post-Procedure Volume (cm^3) 48.36 cm^3 09/25/24 1625   Wound Healing % -[id number] 09/25/24 1625   Tunneling (cm) 0 cm 09/25/24 1625   Undermining (cm) 4 cm 09/25/24 1625   Undermining of Wound, 1st Location From 10 o'clock;To 12 o'clock 09/25/24 1625   Wound Odor Mild 09/25/24 1625   Exposed Structures Fascia 09/25/24 1625   Number of days: 147       PROCEDURE: Excisional debridement of right and left ischial wounds  -2% viscous lidocaine applied topically to wound bed for approximately 5 minutes prior to debridement  -Curette used to debride wound bed.  Excisional debridement was performed to remove devitalized tissue until healthy, bleeding tissue was visualized.   Entire surface of wound, 25.5 cm2 debrided.  Tissue debrided into the muscle / fascia layer.    -Bleeding controlled with manual pressure.    -Wound care completed by wound RN, refer to flowsheet  -Patient tolerated the procedure well, without c/o pain or discomfort.    -No excisional debridement of left posterior leg wound required    Pertinent Labs and Diagnostics:    Labs:     A1c: No results found for: \"HBA1C\"     [facility] results, 7/1/2022 (under media tab)    CRP 13    ESR 31      IMAGING:     X-ray left tib-fib ordered 2/16/2024 through [facility]    12/11/2023-CT of abdomen pelvis with contrast  IMPRESSION:   1.  Right ischial decubitus ulcer extending to bone with soft tissue gas tracking along the right perineum. Appearance suggesting osteomyelitis, consider component of necrotizing fasciitis as clinically appropriate.  2.  Small pericardial effusion  3.  Left adrenal nodule, density on prior noncontrast CT demonstrates adenoma.  4.  Hepatomegaly  5.  Enlarged prostate, workup and evaluation for causes of prostate enlargement recommended as clinically appropriate.  6.  " Atherosclerosis and atherosclerotic coronary artery disease    12/15/2023-bone scan of left foot  IMPRESSION:     1.  Mild increased activity in the LEFT 1st and 3rd toes on blood pool and delayed images possibly indicating inflammation/infection.  2.  No significant blood flow asymmetry.          VASCULAR STUDIES: No results found.    LAST  WOUND CULTURE:   Lab Results   Component Value Date/Time    CULTRSULT - (A) 09/12/2024 12:00 PM    CULTRSULT (A) 09/12/2024 12:00 PM     Proteus mirabilis ESBL  >100,000 cfu/mL  Extended Spectrum Beta-lactamase (ESBL) isolated.  ESBL's may be clinically resistant to therapy with  Penicillins,Cephalosporins or Aztreonam despite  apparent in vitro susceptibility to some of these agents.  The patient requires contact isolation.  Please contact pharmacy or an Infectious Disease Specialist  if you have any questions about appropriate therapy.        PATHOLOGY  2/17/2023-bone fragment extracted from left lower extremity wound\\  FINAL DIAGNOSIS:     A. Left leg bone fragment at base of chronic wound:          Extensively degenerated fibrocartilaginous tissue with a rim of           fibrinopurulent debris          Correlate with culture findings            Comment: While no residual intact bone is identified, these           findings are suggestive of adjacent osteomyelitis.      ASSESSMENT AND PLAN:     1. Sacral decubitus ulcer, stage IV ([facility])  Comments: Ulcer first noted in early April 2022 as small open area which quickly enlarged.  This ulcer is present distal from previous sacral ulcer which healed after surgery in January.  Patient has history of flap reconstruction x2 to this area.    9/25/2024: Wound remains resolved.  High risk for recurrence  -Patient does spend a lot of time up in his wheelchair, and wishes to continue to do so for his quality of life.  He lives independently, drives, and is involved with family activities.  He does have a formerly [facility] wheelchair cushion, suspect  may be inadequate. Has been referred to [facility] for repeat seating evaluation.  Evaluation is scheduled for sometime in September  - Known OM that was previously treated. CT scan done during recent hospitalization did not show OM of sacrum, however OM of the ischium noted.  -Monitor site each clinic visit  -Patient is very well versed in pressure relief strategies    Wound care: Silicone foam pressure relieving dressing    2. Pressure injury of right ischium, stage 4 ([facility])  Comments: Abscess and OM found on CT during hospitalization in December 2023.  Patient underwent I&D with VAC placement.  IV antibiotics through 1/22/2024.    9/25/2024: Wound measures about the same.  Slightly more granulation tissue noted today.  - Excisional debridement of nonviable tissue from wound in clinic today, medically necessary to promote wound healing  -Home health has been instructed to continue Dakins soaked gauze to wound for prior to placement of new VAC dressing as needed for odor / slough.   -Continue wound VAC to right ischial wound  -Patient to return to clinic weekly for assessment and debridement  -Home health to change dressing 2 times per week in between clinic visits.  Dakin's soaks for 15 minutes each dressing change  -Patient is very well versed on pressure relief measures, has adequate surface on bed, alternating low air loss.  -Seating eval ordered through [employer] has been scheduled, he does not recall when.    Wound care:  NPWT at -125 mmHg to accelerate granulation, change 3 times per week, sacral offloading foam.    3. Pressure injury of left ischium, stage 4 ([facility])    9/25/2024: Wound measures about the same.   New granulation tissue is noted.  Scattered areas of slough  - Excisional debridement of wound in clinic today, medically necessary to promote wound healing.  - Continue wound VAC  - Patient to return to clinic weekly for assessment and debridement  - Continue NPWT    Wound care: NPWT at -125 mmHg to  accelerate granulation, change 3 times per week, sacral offloading foam.    4. Other acute osteomyelitis, other site ([facility])    2524: Bone fragments removed during clinic visit in June were sent for pathology, polymicrobial, most concerning was an MDR ESBL Proteus.   -Patient completed IV antibiotics.  No further antibiotics at this time per ID  -Patient understands he has a very low threshold for infection/sepsis.  He understands he is to go to the emergency room immediately if he begins to experience fevers, chills, nausea or vomiting, or if he notices radiating erythema or purulent drainage from his wounds.  -ID following    5. Postoperative wound dehiscence, subsequent encounter  6. Osteomyelitis of left lower extremity ([facility])  Comments: On 4/26/2022 patient underwent irrigation and debridement of multiple compartments of the left lower extremity states for pressure injury, with bone excision, and complex closure of chronic wound using biologic skin substitute.  During postop visit in surgeons office on 5/11, surgical site was noted to be dehisced.      9/25/2024: Wound has again healed, skin intact.  High risk for recurrence  - Wound waxes and wanes due to pressure and underlying known chronic OM  -No excisional debridement of these wounds required today  -Patient to be mindful of offloading when supine, should assure that his leg is not rotating medially  -Monitor site each clinic visit  Wound care: Silicone foam dressing, Tubigrip D    7. Pressure injury of left foot, stage 4 ([facility])  8. Pressure injury of left leg, stage 3 ([facility])  9. Pressure injury of left heel, stage 3 ([facility])    9/25/2024: These wounds remain resolved, though at high risk for recurrence  -Monitor pressure points each clinic visit  - Patient aware of offloading.    10. Quadriplegia, C5-C7 incomplete ([facility])  Comments: Complicating factor.  Impaired mobility and sensation  -Patient is still spending 7-8 hours/day up in his wheelchair, he has Roho  cushion nearly 5 years old, and knows to reposition frequently.  Wears heel float boots bilaterally at all times  - Patient reports that he has seating evaluation with [employer] upcoming.    Please note that this note may have been created using voice recognition software. I have [facility] to optimize the interface.  I have made every reasonable attempt to correct obvious errors, but there may be errors of grammar and possibly content that I did not discover before finalizing the note.

## 2024-10-01 ENCOUNTER — TELEPHONE (OUTPATIENT)
Dept: CARDIOLOGY | Facility: MEDICAL CENTER | Age: 74
End: 2024-10-01
Payer: MEDICARE

## 2024-10-02 ENCOUNTER — APPOINTMENT (OUTPATIENT)
Dept: WOUND CARE | Facility: MEDICAL CENTER | Age: 74
End: 2024-10-02
Payer: MEDICARE

## 2024-10-02 VITALS
RESPIRATION RATE: 20 BRPM | SYSTOLIC BLOOD PRESSURE: 132 MMHG | DIASTOLIC BLOOD PRESSURE: 80 MMHG | TEMPERATURE: 98.4 F | HEART RATE: 68 BPM

## 2024-10-02 DIAGNOSIS — G82.54 QUADRIPLEGIA, C5-C7, INCOMPLETE (HCC): ICD-10-CM

## 2024-10-02 DIAGNOSIS — T81.31XD POSTOPERATIVE WOUND DEHISCENCE, SUBSEQUENT ENCOUNTER: ICD-10-CM

## 2024-10-02 DIAGNOSIS — M86.18 OTHER ACUTE OSTEOMYELITIS, OTHER SITE (HCC): ICD-10-CM

## 2024-10-02 DIAGNOSIS — L89.154 SACRAL DECUBITUS ULCER, STAGE IV (HCC): ICD-10-CM

## 2024-10-02 DIAGNOSIS — L89.324 PRESSURE INJURY OF LEFT ISCHIUM, STAGE 4 (HCC): ICD-10-CM

## 2024-10-02 DIAGNOSIS — L89.893 PRESSURE INJURY OF LEFT LEG, STAGE 3 (HCC): ICD-10-CM

## 2024-10-02 DIAGNOSIS — M86.9 OSTEOMYELITIS OF LEFT LOWER EXTREMITY (HCC): ICD-10-CM

## 2024-10-02 DIAGNOSIS — L89.314 PRESSURE INJURY OF RIGHT ISCHIUM, STAGE 4 (HCC): ICD-10-CM

## 2024-10-02 DIAGNOSIS — L89.623 PRESSURE INJURY OF LEFT HEEL, STAGE 3 (HCC): ICD-10-CM

## 2024-10-02 DIAGNOSIS — L89.894 PRESSURE INJURY OF LEFT FOOT, STAGE 4 (HCC): ICD-10-CM

## 2024-10-02 PROCEDURE — 11045 DBRDMT SUBQ TISS EACH ADDL: CPT

## 2024-10-02 PROCEDURE — 99213 OFFICE O/P EST LOW 20 MIN: CPT

## 2024-10-02 PROCEDURE — 11046 DBRDMT MUSC&/FSCA EA ADDL: CPT

## 2024-10-02 PROCEDURE — 11043 DBRDMT MUSC&/FSCA 1ST 20/<: CPT

## 2024-10-02 PROCEDURE — 11043 DBRDMT MUSC&/FSCA 1ST 20/<: CPT | Performed by: NURSE PRACTITIONER

## 2024-10-02 PROCEDURE — 11046 DBRDMT MUSC&/FSCA EA ADDL: CPT | Performed by: NURSE PRACTITIONER

## 2024-10-02 PROCEDURE — 3075F SYST BP GE 130 - 139MM HG: CPT | Performed by: NURSE PRACTITIONER

## 2024-10-02 PROCEDURE — 3079F DIAST BP 80-89 MM HG: CPT | Performed by: NURSE PRACTITIONER

## 2024-10-08 PROBLEM — D69.6 THROMBOCYTOPENIA (HCC): Status: RESOLVED | Noted: 2023-06-18 | Resolved: 2024-10-08

## 2024-10-09 ENCOUNTER — OFFICE VISIT (OUTPATIENT)
Dept: WOUND CARE | Facility: MEDICAL CENTER | Age: 74
End: 2024-10-09
Payer: MEDICARE

## 2024-10-09 VITALS
TEMPERATURE: 97.8 F | HEART RATE: 60 BPM | SYSTOLIC BLOOD PRESSURE: 119 MMHG | RESPIRATION RATE: 20 BRPM | DIASTOLIC BLOOD PRESSURE: 86 MMHG

## 2024-10-09 DIAGNOSIS — L89.154 SACRAL DECUBITUS ULCER, STAGE IV (HCC): ICD-10-CM

## 2024-10-09 DIAGNOSIS — G82.54 QUADRIPLEGIA, C5-C7, INCOMPLETE (HCC): ICD-10-CM

## 2024-10-09 DIAGNOSIS — T81.31XD POSTOPERATIVE WOUND DEHISCENCE, SUBSEQUENT ENCOUNTER: ICD-10-CM

## 2024-10-09 DIAGNOSIS — L89.324 PRESSURE INJURY OF LEFT ISCHIUM, STAGE 4 (HCC): ICD-10-CM

## 2024-10-09 DIAGNOSIS — L89.623 PRESSURE INJURY OF LEFT HEEL, STAGE 3 (HCC): ICD-10-CM

## 2024-10-09 DIAGNOSIS — L89.314 PRESSURE INJURY OF RIGHT ISCHIUM, STAGE 4 (HCC): ICD-10-CM

## 2024-10-09 DIAGNOSIS — M86.18 OTHER ACUTE OSTEOMYELITIS, OTHER SITE (HCC): ICD-10-CM

## 2024-10-09 DIAGNOSIS — L89.894 PRESSURE INJURY OF LEFT FOOT, STAGE 4 (HCC): ICD-10-CM

## 2024-10-09 DIAGNOSIS — M86.9 OSTEOMYELITIS OF LEFT LOWER EXTREMITY (HCC): ICD-10-CM

## 2024-10-09 DIAGNOSIS — L89.893 PRESSURE INJURY OF LEFT LEG, STAGE 3 (HCC): ICD-10-CM

## 2024-10-09 PROCEDURE — 11046 DBRDMT MUSC&/FSCA EA ADDL: CPT

## 2024-10-09 PROCEDURE — 11043 DBRDMT MUSC&/FSCA 1ST 20/<: CPT | Performed by: NURSE PRACTITIONER

## 2024-10-09 PROCEDURE — 3074F SYST BP LT 130 MM HG: CPT | Performed by: NURSE PRACTITIONER

## 2024-10-09 PROCEDURE — 11042 DBRDMT SUBQ TIS 1ST 20SQCM/<: CPT

## 2024-10-09 PROCEDURE — 11043 DBRDMT MUSC&/FSCA 1ST 20/<: CPT

## 2024-10-09 PROCEDURE — 3079F DIAST BP 80-89 MM HG: CPT | Performed by: NURSE PRACTITIONER

## 2024-10-09 PROCEDURE — 11046 DBRDMT MUSC&/FSCA EA ADDL: CPT | Performed by: NURSE PRACTITIONER

## 2024-10-16 ENCOUNTER — OFFICE VISIT (OUTPATIENT)
Dept: WOUND CARE | Facility: MEDICAL CENTER | Age: 74
End: 2024-10-16
Payer: MEDICARE

## 2024-10-16 VITALS
TEMPERATURE: 98 F | RESPIRATION RATE: 20 BRPM | HEART RATE: 82 BPM | DIASTOLIC BLOOD PRESSURE: 82 MMHG | SYSTOLIC BLOOD PRESSURE: 110 MMHG

## 2024-10-16 DIAGNOSIS — L89.314 PRESSURE INJURY OF RIGHT ISCHIUM, STAGE 4 (HCC): ICD-10-CM

## 2024-10-16 DIAGNOSIS — L89.894 PRESSURE INJURY OF LEFT FOOT, STAGE 4 (HCC): ICD-10-CM

## 2024-10-16 DIAGNOSIS — T81.31XD POSTOPERATIVE WOUND DEHISCENCE, SUBSEQUENT ENCOUNTER: ICD-10-CM

## 2024-10-16 DIAGNOSIS — L89.893 PRESSURE INJURY OF LEFT LEG, STAGE 3 (HCC): ICD-10-CM

## 2024-10-16 DIAGNOSIS — M86.18 OTHER ACUTE OSTEOMYELITIS, OTHER SITE (HCC): ICD-10-CM

## 2024-10-16 DIAGNOSIS — M86.9 OSTEOMYELITIS OF LEFT LOWER EXTREMITY (HCC): ICD-10-CM

## 2024-10-16 DIAGNOSIS — L89.154 SACRAL DECUBITUS ULCER, STAGE IV (HCC): ICD-10-CM

## 2024-10-16 DIAGNOSIS — G82.54 QUADRIPLEGIA, C5-C7, INCOMPLETE (HCC): ICD-10-CM

## 2024-10-16 DIAGNOSIS — L89.324 PRESSURE INJURY OF LEFT ISCHIUM, STAGE 4 (HCC): ICD-10-CM

## 2024-10-16 DIAGNOSIS — L89.623 PRESSURE INJURY OF LEFT HEEL, STAGE 3 (HCC): ICD-10-CM

## 2024-10-16 PROCEDURE — 3079F DIAST BP 80-89 MM HG: CPT | Performed by: NURSE PRACTITIONER

## 2024-10-16 PROCEDURE — 11043 DBRDMT MUSC&/FSCA 1ST 20/<: CPT | Performed by: NURSE PRACTITIONER

## 2024-10-16 PROCEDURE — 11043 DBRDMT MUSC&/FSCA 1ST 20/<: CPT

## 2024-10-16 PROCEDURE — 11046 DBRDMT MUSC&/FSCA EA ADDL: CPT | Performed by: NURSE PRACTITIONER

## 2024-10-16 PROCEDURE — 3074F SYST BP LT 130 MM HG: CPT | Performed by: NURSE PRACTITIONER

## 2024-10-16 PROCEDURE — 11046 DBRDMT MUSC&/FSCA EA ADDL: CPT

## 2024-10-23 ENCOUNTER — HOSPITAL ENCOUNTER (OUTPATIENT)
Facility: MEDICAL CENTER | Age: 74
End: 2024-10-23
Attending: NURSE PRACTITIONER
Payer: MEDICARE

## 2024-10-23 ENCOUNTER — OFFICE VISIT (OUTPATIENT)
Dept: WOUND CARE | Facility: MEDICAL CENTER | Age: 74
End: 2024-10-23
Payer: MEDICARE

## 2024-10-23 VITALS
HEART RATE: 65 BPM | RESPIRATION RATE: 18 BRPM | DIASTOLIC BLOOD PRESSURE: 70 MMHG | TEMPERATURE: 97.5 F | SYSTOLIC BLOOD PRESSURE: 122 MMHG | OXYGEN SATURATION: 97 %

## 2024-10-23 DIAGNOSIS — L89.894 PRESSURE INJURY OF LEFT FOOT, STAGE 4 (HCC): ICD-10-CM

## 2024-10-23 DIAGNOSIS — G82.54 QUADRIPLEGIA, C5-C7, INCOMPLETE (HCC): ICD-10-CM

## 2024-10-23 DIAGNOSIS — L89.623 PRESSURE INJURY OF LEFT HEEL, STAGE 3 (HCC): ICD-10-CM

## 2024-10-23 DIAGNOSIS — T81.31XD POSTOPERATIVE WOUND DEHISCENCE, SUBSEQUENT ENCOUNTER: ICD-10-CM

## 2024-10-23 DIAGNOSIS — L08.9 WOUND INFECTION: ICD-10-CM

## 2024-10-23 DIAGNOSIS — L89.314 PRESSURE INJURY OF RIGHT ISCHIUM, STAGE 4 (HCC): ICD-10-CM

## 2024-10-23 DIAGNOSIS — T14.8XXA WOUND INFECTION: ICD-10-CM

## 2024-10-23 DIAGNOSIS — M86.18 OTHER ACUTE OSTEOMYELITIS, OTHER SITE (HCC): ICD-10-CM

## 2024-10-23 DIAGNOSIS — L89.154 SACRAL DECUBITUS ULCER, STAGE IV (HCC): ICD-10-CM

## 2024-10-23 DIAGNOSIS — L89.324 PRESSURE INJURY OF LEFT ISCHIUM, STAGE 4 (HCC): ICD-10-CM

## 2024-10-23 DIAGNOSIS — M86.9 OSTEOMYELITIS OF LEFT LOWER EXTREMITY (HCC): ICD-10-CM

## 2024-10-23 PROCEDURE — 11046 DBRDMT MUSC&/FSCA EA ADDL: CPT | Performed by: NURSE PRACTITIONER

## 2024-10-23 PROCEDURE — 99213 OFFICE O/P EST LOW 20 MIN: CPT | Mod: 25 | Performed by: NURSE PRACTITIONER

## 2024-10-23 PROCEDURE — 99213 OFFICE O/P EST LOW 20 MIN: CPT

## 2024-10-23 PROCEDURE — 11043 DBRDMT MUSC&/FSCA 1ST 20/<: CPT | Performed by: NURSE PRACTITIONER

## 2024-10-23 PROCEDURE — 87205 SMEAR GRAM STAIN: CPT

## 2024-10-23 PROCEDURE — 87077 CULTURE AEROBIC IDENTIFY: CPT

## 2024-10-23 PROCEDURE — 11043 DBRDMT MUSC&/FSCA 1ST 20/<: CPT

## 2024-10-23 PROCEDURE — 87186 SC STD MICRODIL/AGAR DIL: CPT

## 2024-10-23 PROCEDURE — 11046 DBRDMT MUSC&/FSCA EA ADDL: CPT

## 2024-10-23 PROCEDURE — 87070 CULTURE OTHR SPECIMN AEROBIC: CPT

## 2024-10-24 LAB
GRAM STN SPEC: NORMAL
SIGNIFICANT IND 70042: NORMAL
SITE SITE: NORMAL
SOURCE SOURCE: NORMAL

## 2024-10-30 ENCOUNTER — OFFICE VISIT (OUTPATIENT)
Dept: WOUND CARE | Facility: MEDICAL CENTER | Age: 74
End: 2024-10-30
Payer: MEDICARE

## 2024-10-30 VITALS
SYSTOLIC BLOOD PRESSURE: 130 MMHG | DIASTOLIC BLOOD PRESSURE: 80 MMHG | HEART RATE: 54 BPM | OXYGEN SATURATION: 98 % | RESPIRATION RATE: 18 BRPM | TEMPERATURE: 97.2 F

## 2024-10-30 DIAGNOSIS — T14.8XXA WOUND INFECTION: ICD-10-CM

## 2024-10-30 DIAGNOSIS — L08.9 WOUND INFECTION: ICD-10-CM

## 2024-10-30 DIAGNOSIS — L89.314 PRESSURE INJURY OF RIGHT ISCHIUM, STAGE 4 (HCC): ICD-10-CM

## 2024-10-30 DIAGNOSIS — T81.31XD POSTOPERATIVE WOUND DEHISCENCE, SUBSEQUENT ENCOUNTER: ICD-10-CM

## 2024-10-30 DIAGNOSIS — L89.894 PRESSURE INJURY OF LEFT FOOT, STAGE 4 (HCC): ICD-10-CM

## 2024-10-30 DIAGNOSIS — L89.324 PRESSURE INJURY OF LEFT ISCHIUM, STAGE 4 (HCC): ICD-10-CM

## 2024-10-30 DIAGNOSIS — L89.154 SACRAL DECUBITUS ULCER, STAGE IV (HCC): ICD-10-CM

## 2024-10-30 DIAGNOSIS — L89.893 PRESSURE INJURY OF LEFT LEG, STAGE 3 (HCC): ICD-10-CM

## 2024-10-30 DIAGNOSIS — M86.9 OSTEOMYELITIS OF LEFT LOWER EXTREMITY (HCC): ICD-10-CM

## 2024-10-30 DIAGNOSIS — M86.18 OTHER ACUTE OSTEOMYELITIS, OTHER SITE (HCC): ICD-10-CM

## 2024-10-30 DIAGNOSIS — G82.54 QUADRIPLEGIA, C5-C7, INCOMPLETE (HCC): ICD-10-CM

## 2024-10-30 DIAGNOSIS — L89.623 PRESSURE INJURY OF LEFT HEEL, STAGE 3 (HCC): ICD-10-CM

## 2024-10-30 PROCEDURE — 11046 DBRDMT MUSC&/FSCA EA ADDL: CPT

## 2024-10-30 PROCEDURE — 11043 DBRDMT MUSC&/FSCA 1ST 20/<: CPT

## 2024-11-06 ENCOUNTER — OFFICE VISIT (OUTPATIENT)
Dept: WOUND CARE | Facility: MEDICAL CENTER | Age: 74
End: 2024-11-06
Payer: MEDICARE

## 2024-11-06 VITALS
DIASTOLIC BLOOD PRESSURE: 88 MMHG | TEMPERATURE: 97.7 F | RESPIRATION RATE: 18 BRPM | SYSTOLIC BLOOD PRESSURE: 152 MMHG | OXYGEN SATURATION: 96 % | HEART RATE: 56 BPM

## 2024-11-06 DIAGNOSIS — L89.154 SACRAL DECUBITUS ULCER, STAGE IV (HCC): ICD-10-CM

## 2024-11-06 DIAGNOSIS — G82.54 QUADRIPLEGIA, C5-C7, INCOMPLETE (HCC): ICD-10-CM

## 2024-11-06 DIAGNOSIS — L89.324 PRESSURE INJURY OF LEFT ISCHIUM, STAGE 4 (HCC): ICD-10-CM

## 2024-11-06 DIAGNOSIS — T81.31XD POSTOPERATIVE WOUND DEHISCENCE, SUBSEQUENT ENCOUNTER: ICD-10-CM

## 2024-11-06 DIAGNOSIS — L89.894 PRESSURE INJURY OF LEFT FOOT, STAGE 4 (HCC): ICD-10-CM

## 2024-11-06 DIAGNOSIS — M86.18 OTHER ACUTE OSTEOMYELITIS, OTHER SITE (HCC): ICD-10-CM

## 2024-11-06 DIAGNOSIS — L89.314 PRESSURE INJURY OF RIGHT ISCHIUM, STAGE 4 (HCC): Primary | ICD-10-CM

## 2024-11-06 DIAGNOSIS — L89.623 PRESSURE INJURY OF LEFT HEEL, STAGE 3 (HCC): ICD-10-CM

## 2024-11-06 DIAGNOSIS — L89.893 PRESSURE INJURY OF LEFT LEG, STAGE 3 (HCC): ICD-10-CM

## 2024-11-06 DIAGNOSIS — M86.9 OSTEOMYELITIS OF LEFT LOWER EXTREMITY (HCC): ICD-10-CM

## 2024-11-06 PROCEDURE — 97607 NEG PRS WND THR NDME<=50SQCM: CPT

## 2024-11-06 PROCEDURE — 11046 DBRDMT MUSC&/FSCA EA ADDL: CPT

## 2024-11-06 PROCEDURE — 3079F DIAST BP 80-89 MM HG: CPT | Performed by: STUDENT IN AN ORGANIZED HEALTH CARE EDUCATION/TRAINING PROGRAM

## 2024-11-06 PROCEDURE — 11046 DBRDMT MUSC&/FSCA EA ADDL: CPT | Performed by: STUDENT IN AN ORGANIZED HEALTH CARE EDUCATION/TRAINING PROGRAM

## 2024-11-06 PROCEDURE — 11043 DBRDMT MUSC&/FSCA 1ST 20/<: CPT

## 2024-11-06 PROCEDURE — 3077F SYST BP >= 140 MM HG: CPT | Performed by: STUDENT IN AN ORGANIZED HEALTH CARE EDUCATION/TRAINING PROGRAM

## 2024-11-06 PROCEDURE — 11043 DBRDMT MUSC&/FSCA 1ST 20/<: CPT | Performed by: STUDENT IN AN ORGANIZED HEALTH CARE EDUCATION/TRAINING PROGRAM

## 2024-11-06 RX ORDER — SODIUM HYPOCHLORITE 1.25 MG/ML
SOLUTION TOPICAL
Qty: 473 ML | Refills: 3 | Status: SHIPPED | OUTPATIENT
Start: 2024-11-06

## 2024-11-07 NOTE — PROGRESS NOTES
Wound vac removed by CNA, wound bed inspected by this RN, no retained foam noted. Wound vac <50cm applied to left ischium wound and right ischium wound using 5 pieces of black foam. 1 piece of black foam to fill right ischium wound bed and bridge to right hip. 1 piece of black foam to fill left ischium wound bed. 2 pieces of black foam to bridge left ischium wound to right hip. 1 piece of black foam to accommodate track pad.  Negative pressure wound therapy resumed at 125mmHg continuous pressure with no leaks noted.   Small sacral offloading dressings to each ischium, over sacrum, and to pad under tubing/track pad (4 total).     Home health orders routed to [facility] via [employer].

## 2024-11-07 NOTE — PATIENT INSTRUCTIONS
-Keep your wound dressing clean, dry, and intact. Only change dressing if it's over saturated, soiled or falls off.     -Wound vac may not have any drainage in tube or cannister & it will still be working.   Change cannister if it does become full by pressing tab on side of machine to remove canister and snap on new one. Full canister can be thrown in the trash. If cannister fills with bright red blood - go to ER. Dressing will be changed every MWF at the wound clini.  If you are having issues with your wound VAC, please consider patching leaks, changing the canister, or calling [phone] for troubleshooting. If the wound VAC has been off or un-operational for over 2 hours, call wound care center to inform them and remove all dressings including black foam and replace with normal saline damp gauze.     -Should you experience any significant changes in your wound(s), such as infection (redness, swelling, localized heat, increased pain, fever > 101 F, chills) or have any questions regarding your home care instructions, please contact the wound center at [phone]. If after hours, contact your primary care physician or go to the hospital emergency room.  If you are admitted to any hospital, you will need a new referral to come back to the wound clinic and any scheduled appointments that you already have, may be cancelled.

## 2024-11-07 NOTE — PROGRESS NOTES
Provider Encounter- Pressure Injury        HISTORY OF PRESENT ILLNESS  Wound History:    START OF CARE IN CLINIC: 1/31/2024 (return to clinic after hospitalization)    REFERRING PROVIDER: Racquel York       WOUNDS-superior coccyx pressure injury, stage IV-resolved                                 Coccyx pressure injury, stage IV-resolved           Inferior coccyx pressure injury, stage IV resolved                                 Right ischial pressure injury, stage IV                                 Left heel pressure injury, recurring stage III-resolved                                 Left posterior lower leg/calf-stage III-resolved                                 Left medial lower leg surgical wound dehiscence-resolved, reopens frequently-resolved            Left ischial pressure injury, stage IV-first observed in clinic 5/1/2024          HISTORY: Patient with history of incomplete quadriplegia referred to [facility] for treatment of a stage IV pressure injury.  He has a history of previous pressure injuries to this area, and underwent muscle flaps in 2019, and then again in 2020.  He was seen in the wound clinic in November 2021 for an ulcer proximal from his current ulcer, and pressure injuries to his left posterior lower leg and left heel.  At that time, it was discovered that the patient had retained VAC foam embedded in the wound bed of the sacral wound.  Attempts were made to get him back to his plastic surgeon, though unsuccessful.  In January he underwent surgical removal of VAC sponge along with excisional debridement of his sacral wound by Dr. Chaves.  After the surgery, his wound went on to heal without incident.   In early April 2022, his home health nurse noted a new sacral ulcer, below the previous ulcer which quickly tripled in size over the following weeks.  The ulcer to his left medial lower leg had also deteriorated, with bone visible at the base..  He was hospitalized from 4/22 until 4/27/2022 and  underwent surgery with Dr. Raman on 4/26 for irrigation and debridement of multiple compartments of the left lower extremity, bone excision, and complex closure of chronic wound using biologic skin substitute.   His sacrococcygeal wound was not surgically addressed during this admission.  He was discharged back to his group home, with home health, and referral to outpatient wound clinic for his sacral wound.  He was instructed to follow-up with his surgeon for his lower leg wound.       Postoperatively, the left medial lower leg incision dehisced.  He was seen by his surgeon at [facility] on 5/11.  The surgeon opted to leave remaining sutures in place, and refer him to the wound clinic for treatment of this wound.   Treatment of this wound was initiated in clinic on 5/12.  During this visit was also noted that his heel DTI had resolved, but that he had a new pressure injury to his left posterior lower extremity.     A new pressure injury was noted to patient's right upper buttock/lower back on 5/20/2022.  Wound was linear in shape, skin discolored but intact.     Abrasion noted to left anterior lower leg.  First observed in clinic on 7/22/2022.  Patient states he bumped his leg into his food tray.     Small DTI noted to patient's left lateral lower leg on 7/29/2022.  Skin intact but discolored.     Large area of deep tissue injury noted to patient's left exterior lower leg.  Patient denied any trauma to this area.  Skin intact.  Wound documented.    1/27/2023: Patient was admitted to [facility] from 1/23-1/25/2023 with gross hematuria. He underwent RICHARD which showed watchman device was in place and he was taken off of Xarelto. While hospitalized wound team was consulted. He was referred back to [facility] and home health upon discharge.    Patient was hospitalized at [facility] for pyelonephritis from 2/26 until 3/2/2023, admitted for fever and general malaise.  He was admitted and initially started on linezolid and meropenem for suspected  UTI and history of multidrug-resistant organisms.  Urine cultures were negative. ID was consulted, recommended CT of chest and abdomen,which were negative for acute findings. However, he was treated with 5 days total course of antibiotics for suspected UTI, and symptoms completely resolved.  During this admission, the inpatient wound team was consulted for treatment of his sacral and lower leg wounds.  A wound culture was taken from his left heel pressure ulcer, negative.  Once stabilized, he was discharged home and referred back to [facility] to resume treatment of his wounds.    Patient was hospitalized at [facility] from 12/11 until 12/23/2023, admitted for fever.  Wound infection suspected.  CT scan of abdomen and pelvis for evaluation of sacral pressure injury showed gas tracking down to the bone consistent with osteomyelitis.  He underwent I&D of right ischial ulcer (documented as buttock) with Dr. Bansal, medial tract leading to an abscess was identified.  Cavity was opened allowing it to drain into the main wound bed.  Wound VAC was placed and managed by wound team during this admission.  A bone scan of patient's left foot was also done, initially concerning for osteomyelitis.  Orthopedic surgery was consulted and did not recommend surgical intervention. ID consulted also, recommended the patient to receive IV ertapenem 1 g every 24 hours plus IV daptomycin 8 mg/kg every 24 hours through 1/22/2024.   He was discharged to [facility] on 1/23 for IV antibiotics and wound care.  From the LTAC he was discharged home on 1/22 with home health and referral back to [facility] to resume management of his wounds  .      Pertinent Medical History: Incomplete quadriplegia, history of stage IV pressure injuries, history of flap procedures to sacral pressure injuries, osteomyelitis, obesity, colostomy in place   Contributing factors: Immobility and Obesity, impaired sensation    Personal support: Attendant-staff at assisted and home health  nursing    TOBACCO USE:   Former smoker, quit in 1977.  Never used smokeless tobacco    Patient's problem list, allergies, and current medications reviewed and updated in Epic    Interval History:  Interval History thinned 7/29/2022.  Please see previous notes for complete interval history.   Interval History thinned 1/27/2023. Please see previous note for complete interval history.  Interval History thinned 3/3/2023.  Please see previous notes for complete interval history.    Interval History thinned 8/4/2023.  Please see previous notes for complete interval history.    Interval History thinned 1/31/2024.  Please see previous notes for complete interval history.    Interval History thinned 6/26/2024.  Please see previous notes for complete interval history.      6/19/2024: Clinic visit with Steve Hodges MD. Patient denies any fevers or chills. Reports he is tolerating Abx. He has appointment with ID later today to discuss OM treatment. He is tolerating wound VAC. Slight bone exposed bilaterally in ischial wounds, slightly worse.    6/26/2024 : Clinic visit with ADILSON Arthur, FNPEGGY-BC, CWDINESHN, CFTRACI.   Patient presents today with significant deterioration of his both ischial wounds, with increased depth of undermining.   He now has a PICC line, and will be starting outpatient infusions of ertapenem later today.  He states he is feeling well, denies fevers, chills, nausea, vomiting, cough or shortness of breath.  Due to deterioration of his wound, we did discuss possibly having him go over the hospital for surgical debridement and IV antibiotics.  He was hesitant to do so.  We agreed to see how he looks after a week or so IV antibiotics.  He is agreeable to minimizing time up in his wheelchair.  He also agrees to go to the emergency room immediately if he develops any signs or symptoms of infection.    7/10/2024: Clinic visit with Steve Hodges MD. Patient reports feeling in normal state of health. He missed  last appointment as he had fall out of wheelchair and was evaluated in ED. He denies any injuries from fall. Patient wounds are measuring slightly smaller. He continues on Ertapenem. Patient reports that he has appointment for seating evaluation from [facility] scheduled, but does not recall the date.    7/17/2024 : Clinic visit with ADILSON Arthur, HOLDEN, LILIYA VALERIO.   Anjum states he is feeling well.  Left ischial wound measures significantly smaller today, however measurements vary somewhat depending on pt's position.  Both wounds appear to be progressing slightly, with improving tissue quality.    7/24/2024 : Clinic visit with ADILSON Arthur, HOLDEN, IMAN, LILIYA.   Patient continues to feel well, offers no complaints.  Right ischial wound measures smaller, left ischial wound is larger.  Patient tolerating VAC without any difficulty.  Home health continues to see him in between clinic visits.  He is still receiving daily infusions of IV antibiotics, will be completing these in August.    7/31/2024 : Clinic visit with ADILSON Arthur, HOLDEN, IMAN, LILIYA.   Anjum continues to feel well, his wounds are slowly progressing.  Tolerating VAC.  He is on IV antibiotics for 1 more week.  Admits that he is up in his wheelchair for 10 to 14 hours/day.    8/7/2024 : Clinic visit with ADILSON Arthur FNP-BC, LILIYA VALERIO.   Anjum states he is feeling well today, offers no complaints.  Both wounds measure a bit smaller today, new granulation tissue noted.  He will be getting his last IV antibiotic infusion today.    8/14/2024 : Clinic visit with ADILSON Arthur FNP-BC, IMAN, LILIYA.   Patient continues to feel well.  He has completed his antibiotics, followed up with ID earlier this week, no further antibiotic therapy at this time.  Ischial wounds are slowly progressing.  Lower extremity wounds remain resolved.    8/21/2024 : Clinic visit with ADILSON Arthur FNP-BC, LILIYA VALERIO.   nAjum states he is feeling  well, offers no complaints.  His wounds are slowly progressing, increased granulation.    8/28/2024 : Clinic visit with ADILSON Arthur, HOLDEN, LILIYA VALERIO.   Turk states he is feeling well overall.  His wounds measure slightly smaller.  He has had some urinary symptoms, reports leakage from around his suprapubic catheter last night, and excessively full urine bag.  He has been in contact with his urologist office.  He also mentions that he has a recurring small scab to his nose, states that it falls off only to return shortly after.  This has been going on for several months.  I offered to refer him to dermatology.    9/11/2024 : Clinic visit with ADILSON Arthur, HOLDEN, LILIYA VALERIO.   Turk states he is feeling well.  He missed his appointment last week due to car troubles.  His vehicle is now running well.  Ischial wounds show some improvement, increased granulation tissue.  He does have a small reopening of left posterior lower leg wound, appears to be a small abrasion over area of scar tissue.    9/18/2024: Clinic visit with Steve Hodges MD. Patient reports doing ok. Denies any acute issues with wound VAC. Reports that he was fitted by [employer] for new seat cushion and is being manufactured, he is not sure when will be ready. Patient's wounds appears slightly improved. Left posterior leg wound remains open.    9/25/2024 : Clinic visit with ADILSON Arthur, HOLDEN, IMAN, LILIYA.   Patient states he is feeling well.  His wounds measure about the same.    10/2/2024: Clinic visit with HOLDEN Johnson CWON, LILIYA.  Pt denies fevers, chills, nausea, vomiting. Bilateral ischial ulcers increased in area.  Left IT quality overall worse compared to right IT.  Recommend Dakin's soak.  Continue with VAC to bilateral IT.    10/9/2024 : Clinic visit with ADILSON Arthur, HOLDEN, IMAN, LILIYA.   Patient continues to feel well overall.  His wounds measure about the same, no evidence of infection.   He does have some minor breakdown over the scar tissue of his sacrum which bears watching.  He has not heard from [employer] about his seat cushion, states he will contact them again.    10/16/2024 : Clinic visit with ADILSON Arthur, HOLDEN, IMAN, LILIYA.   Anjum continues to feel well.  His wounds measure slightly smaller.  Sacral wound just slightly open.  He called [employer] earlier this week, was told his new cushion is ready, and that they would deliver it next Monday.  He also states he is due for a new wheelchair, but has not yet become process.      10/23/2024 : Clinic visit with ADILSON Arthur, HOLDEN, IMAN, LILIYA.   Anjum's wounds present today with significantly more odor.  He states he is feeling fine, denies fevers, chills, nausea, vomiting, cough or shortness of breath.  Increased drainage noted.  Wounds measure about the same.  Culture collected in clinic today after debridement.  VAC placed on hold.  Wounds packed with Puracyn moistened silver Hydrofiber.   Patient reminded that he is to go to the emergency room if he notices any increased redness, swelling, drainage or odor, or if he develops fever, chills, nausea or vomiting.    He has a new cushion to his wheelchair.  States that he does not yet have a new wheelchair, will be picking on out the next few weeks.    10/30/2024 : Clinic visit with ADILSON Arthur, HOLDEN, IMAN, LILIYA.   Anjum states he is feeling well.  Wound odor still noticeable.  Culture collected last visit was positive for light growth of Proteus.  Given continued odor, will go ahead and prescribe short course of Augmentin, no other p.o. options available based on sensitivities.  Continue with Dakin's wound cleansing.  Home health to restart VAC on Friday 11/6/2024: Clinic visit with Steve Hodges MD. Patient reports doing well, denies any acute issues. Tolerating augmentin well without side effects. Odor much improved today. Wounds are improving slowly. Continue wound  VAC.    REVIEW OF SYSTEMS:   Unchanged from previous wound clinic assessment on 10/30/2024, except as noted in interval history above.      PHYSICAL EXAMINATION:   BP (!) 152/88   Pulse (!) 56   Temp 36.5 °C (97.7 °F) (Temporal)   Resp 18   SpO2 96%   Physical Exam  Constitutional:       Appearance: He is obese.   Cardiovascular:      Rate and Rhythm: Normal rate.   Pulmonary:      Effort: Pulmonary effort is normal.   Abdominal:      Comments: Colostomy left lower quadrant   Genitourinary:     Comments: Suprapubic catheter to down drain   Skin:     Comments: Stage IV pressure injury to right ischium: Wound slightly improved. Tissue quality improved, increased granulation.  Moderate serous drainage, slight odor.  No periwound erythema or induration    Stage IV pressure injury of left ischium: Wound slowly improving, Slough to wound bed.  Moderate serosanguineous drainage.  Improved odor.  No periwound erythema or induration    Sacral wound remains resolved  All other wounds remain healed, high risk for recurrence     Neurological:      Mental Status: He is alert and oriented to person, place, and time.   Psychiatric:         Mood and Affect: Mood normal.       WOUND ASSESSMENT  Wound 12/12/23 Pressure Injury Ischium Right (Active)   Wound Image    11/06/24 1630   Site Assessment Pink;Red 11/06/24 1630   Periwound Assessment Scar tissue 11/06/24 1630   Margins Unattached edges 11/06/24 1630   Closure Secondary intention 10/16/24 1700   Drainage Amount Large 11/06/24 1630   Drainage Description Serosanguineous 11/06/24 1630   Treatments Cleansed;Provider debridement;Site care 11/06/24 1630   Offloading/DME Other (comment) 10/30/24 1530   Wound Cleansing Hypochlorus Acid 11/06/24 1630   Periwound Protectant Skin Protectant Wipes to Periwound;Drape 11/06/24 1630   Dressing Status Intact;Old drainage 10/02/24 1500   Dressing Changed New 10/30/24 1530   Dressing Cleansing/Solutions Not Applicable 11/06/24 1630    Dressing Options Wound Vac;Offloading Dressing - Sacral 11/06/24 1630   Dressing Change/Treatment Frequency Monday, Wednesday, Friday, and As Needed 11/06/24 1630   Wound Team Following Weekly 10/16/24 1700   WOUND NURSE ONLY - Pressure Injury Stage 4 11/06/24 1630   Wound Length (cm) 3.5 cm 11/06/24 1630   Wound Width (cm) 2 cm 11/06/24 1630   Wound Depth (cm) 1.2 cm 11/06/24 1630   Wound Surface Area (cm^2) 7 cm^2 11/06/24 1630   Wound Volume (cm^3) 8.4 cm^3 11/06/24 1630   Post-Procedure Length (cm) 3.5 cm 11/06/24 1630   Post-Procedure Width (cm) 2.1 cm 11/06/24 1630   Post-Procedure Depth (cm) 1.2 cm 11/06/24 1630   Post-Procedure Surface Area (cm^2) 7.35 cm^2 11/06/24 1630   Post-Procedure Volume (cm^3) 8.82 cm^3 11/06/24 1630   Wound Healing % 85 11/06/24 1630   Tunneling (cm) 0 cm 11/06/24 1630   Tunneling Clock Position of Wound 2 07/10/24 1615   Undermining (cm) 2 cm 11/06/24 1630   Undermining of Wound, 1st Location From 11 o'clock;To 1 o'clock 11/06/24 1630   Undermining (cm) - 2nd location 0 cm 09/25/24 1625   Undermining of Wound, 2nd Location From 12 o'clock;To 2 o'clock 08/14/24 1545   Wound Odor None 11/06/24 1630   Exposed Structures Fascia;Adipose 11/06/24 1630   Number of days: 330       Wound 05/01/24 Pressure Injury Ischium Left (Active)   Wound Image    11/06/24 1630   Site Assessment Pink;Red 11/06/24 1630   Periwound Assessment Scar tissue 11/06/24 1630   Margins Unattached edges 11/06/24 1630   Closure Secondary intention 09/18/24 1500   Drainage Amount Large 11/06/24 1630   Drainage Description Serosanguineous 11/06/24 1630   Treatments Cleansed;Provider debridement;Site care 11/06/24 1630   Offloading/DME Other (comment) 10/30/24 1530   Wound Cleansing Hypochlorus Acid 11/06/24 1630   Periwound Protectant Skin Protectant Wipes to Periwound;Drape 11/06/24 1630   Dressing Changed New 10/30/24 1530   Dressing Cleansing/Solutions Not Applicable 11/06/24 1630   Dressing Options Wound  "Vac;Offloading Dressing - Sacral 11/06/24 1630   Dressing Change/Treatment Frequency Monday, Wednesday, Friday, and As Needed 11/06/24 1630   Wound Team Following Weekly 10/16/24 1700   WOUND NURSE ONLY - Pressure Injury Stage 4 11/06/24 1630   Non-staged Wound Description Not applicable 09/18/24 1600   Wound Length (cm) 4.2 cm 11/06/24 1630   Wound Width (cm) 2.5 cm 11/06/24 1630   Wound Depth (cm) 2.5 cm 11/06/24 1630   Wound Surface Area (cm^2) 10.5 cm^2 11/06/24 1630   Wound Volume (cm^3) 26.25 cm^3 11/06/24 1630   Post-Procedure Length (cm) 4.3 cm 11/06/24 1630   Post-Procedure Width (cm) 2.5 cm 11/06/24 1630   Post-Procedure Depth (cm) 2.5 cm 11/06/24 1630   Post-Procedure Surface Area (cm^2) 10.75 cm^2 11/06/24 1630   Post-Procedure Volume (cm^3) 26.875 cm^3 11/06/24 1630   Wound Healing % -21696 11/06/24 1630   Tunneling (cm) 0 cm 11/06/24 1630   Undermining (cm) 2.5 cm 11/06/24 1630   Undermining of Wound, 1st Location From 11 o'clock;To 1 o'clock 11/06/24 1630   Wound Odor None 11/06/24 1630   Exposed Structures Fascia 11/06/24 1630   Number of days: 189       PROCEDURE: Excisional debridement of right and left ischial wounds  -2% viscous lidocaine applied topically to wound bed for approximately 5 minutes prior to debridement  -Curette used to debride wound bed.  Excisional debridement was performed to remove devitalized tissue until healthy, bleeding tissue was visualized.  Total area debrided was approximately 25 cm², including into undermined areas of wounds.  Tissue debrided into the muscle / fascia layer.    -Bleeding controlled with manual pressure.    -Wound care completed by wound RN, refer to flowsheet  -Patient tolerated the procedure well, without c/o pain or discomfort.    -No excisional debridement of sacral wound required    Pertinent Labs and Diagnostics:    Labs:     A1c: No results found for: \"HBA1C\"     [facility] results, 7/1/2022 (under media tab)    CRP 13    ESR 31      IMAGING: "     X-ray left tib-fib ordered 2/16/2024 through [facility]    12/11/2023-CT of abdomen pelvis with contrast  IMPRESSION:   1.  Right ischial decubitus ulcer extending to bone with soft tissue gas tracking along the right perineum. Appearance suggesting osteomyelitis, consider component of necrotizing fasciitis as clinically appropriate.  2.  Small pericardial effusion  3.  Left adrenal nodule, density on prior noncontrast CT demonstrates adenoma.  4.  Hepatomegaly  5.  Enlarged prostate, workup and evaluation for causes of prostate enlargement recommended as clinically appropriate.  6.  Atherosclerosis and atherosclerotic coronary artery disease    12/15/2023-bone scan of left foot  IMPRESSION:     1.  Mild increased activity in the LEFT 1st and 3rd toes on blood pool and delayed images possibly indicating inflammation/infection.  2.  No significant blood flow asymmetry.          VASCULAR STUDIES: No results found.    LAST  WOUND CULTURE:   Lab Results   Component Value Date/Time    CULTRSULT Heavy growth usual skin ade. (A) 10/23/2024 03:36 PM    CULTRSULT (A) 10/23/2024 03:36 PM     Proteus mirabilis ESBL  Light growth  Extended Spectrum Beta-lactamase (ESBL) isolated.  ESBL's may be clinically resistant to therapy with  Penicillins,Cephalosporins or Aztreonam despite  apparent in vitro susceptibility to some of these agents.  The patient requires contact isolation.  Please contact pharmacy or an Infectious Disease Specialist  if you have any questions about appropriate therapy.        PATHOLOGY  2/17/2023-bone fragment extracted from left lower extremity wound\\  FINAL DIAGNOSIS:     A. Left leg bone fragment at base of chronic wound:          Extensively degenerated fibrocartilaginous tissue with a rim of           fibrinopurulent debris          Correlate with culture findings            Comment: While no residual intact bone is identified, these           findings are suggestive of adjacent  osteomyelitis.      ASSESSMENT AND PLAN:     1. Sacral decubitus ulcer, stage IV (HCC)  Comments: Ulcer first noted in early April 2022 as small open area which quickly enlarged.  This ulcer is present distal from previous sacral ulcer which healed after surgery in January.  Patient has history of flap reconstruction x2 to this area.    11/6/2024: Remains healed, high risk for recurrence  -Monitor sacral skin each clinic visit  -Patient does spend a lot of time up in his wheelchair, and wishes to continue to do so for his quality of life.  He lives independently, drives, and is involved with family activities.    -His presents today with a new cushion in his wheelchair.  Has yet to pick out a new wheelchair  - Known OM that was previously treated. CT scan done during recent hospitalization did not show OM of sacrum, however OM of the ischium noted.  -Patient is very well versed in pressure relief strategies    Wound care: Silicone foam pressure relieving dressing    2. Pressure injury of right ischium, stage 4 ([facility])  Comments: Abscess and OM found on CT during hospitalization in December 2023.  Patient underwent I&D with VAC placement.  IV antibiotics through 1/22/2024.    11/6/2024: Wound slightly improved with improved tissue quality  - Excisional debridement of nonviable tissue from wound in clinic today, medically necessary to promote wound healing  -Home health  to continue Dakins soaked gauze to wound for approximately 10 minutes with each dressing change  - Continue wound VAC  -Patient to return to clinic weekly for assessment, debridement, and dressing change.    -Home health to change dressing 2 times per week in between clinic visits.    -Patient is very well versed on pressure relief measures, has adequate surface on bed, alternating low air loss.  - Patient tolerating Augmentin, odor has resolved.    Wound care: NPWT -125mmHg continuous, black foam    3. Pressure injury of left ischium, stage 4  ([facility])    11/6/2024: Wound slightly improved with improved tissue quality  - Excisional debridement of wound in clinic today, medically necessary to promote wound healing.  - Patient to return to clinic weekly for assessment, debridement, and dressing change  - Continue VAC  -Home health to change dressing 2 times per week in between clinic visits.    -Patient has new cushion in his wheelchair, see above  -Patient is very well versed on pressure relief measures, has adequate surface on bed, alternating low air loss.    Wound care: NPWT -125mmHg continuous, black foam    4. Other acute osteomyelitis, other site (Formerly [facility])    11/6/2024: Bone fragments removed during clinic visit in June were sent for pathology, polymicrobial, most concerning was an MDR ESBL Proteus.   -Patient completed IV antibiotics.  No further antibiotics at this time per ID  -Patient understands he has a very low threshold for infection/sepsis.  He understands he is to go to the emergency room immediately if he begins to experience fevers, chills, nausea or vomiting, or if he notices radiating erythema or purulent drainage from his wounds.  -ID following    5. Postoperative wound dehiscence, subsequent encounter  6. Osteomyelitis of left lower extremity (Formerly [facility])  Comments: On 4/26/2022 patient underwent irrigation and debridement of multiple compartments of the left lower extremity states for pressure injury, with bone excision, and complex closure of chronic wound using biologic skin substitute.  During postop visit in surgeons office on 5/11, surgical site was noted to be dehisced.      11/6/2024: Skin remains intact  - Wound waxes and wanes due to pressure and underlying known chronic OM  -Patient to be mindful of offloading when supine, should assure that his leg is not rotating medially  -Monitor site each clinic visit  Wound care: Silicone foam dressing, Tubigrip D    7. Pressure injury of left foot, stage 4 (Formerly [facility])  8. Pressure injury of left leg,  stage 3 (Formerly [facility])  9. Pressure injury of left heel, stage 3 (Former[facility])    11/6/2024: All of these wounds remain healed  -Monitor pressure points each clinic visit  - Patient aware of offloading.    10. Quadriplegia, C5-C7 incomplete (Former[facility])  Comments: Complicating factor.  Impaired mobility and sensation  -Patient is still spending 7-8 hours/day up in his wheelchair and knows to reposition frequently.  Wears heel float boots bilaterally at all times  - Patient reports that he has seating evaluation with [employer] upcoming.        Please note that this note may have been created using voice recognition software. I have worked with technical experts from [employer] to optimize the interface.  I have made every reasonable attempt to correct obvious errors, but there may be errors of grammar and possibly content that I did not discover before finalizing the note.

## 2024-11-13 ENCOUNTER — OFFICE VISIT (OUTPATIENT)
Dept: WOUND CARE | Facility: MEDICAL CENTER | Age: 74
End: 2024-11-13
Payer: MEDICARE

## 2024-11-13 VITALS
SYSTOLIC BLOOD PRESSURE: 126 MMHG | HEART RATE: 58 BPM | DIASTOLIC BLOOD PRESSURE: 86 MMHG | TEMPERATURE: 97 F | RESPIRATION RATE: 18 BRPM | OXYGEN SATURATION: 96 %

## 2024-11-13 DIAGNOSIS — L89.154 SACRAL DECUBITUS ULCER, STAGE IV (HCC): ICD-10-CM

## 2024-11-13 DIAGNOSIS — L89.894 PRESSURE INJURY OF LEFT FOOT, STAGE 4 (HCC): ICD-10-CM

## 2024-11-13 DIAGNOSIS — G82.54 QUADRIPLEGIA, C5-C7, INCOMPLETE (HCC): ICD-10-CM

## 2024-11-13 DIAGNOSIS — L89.324 PRESSURE INJURY OF LEFT ISCHIUM, STAGE 4 (HCC): Primary | ICD-10-CM

## 2024-11-13 DIAGNOSIS — L89.893 PRESSURE INJURY OF LEFT LEG, STAGE 3 (HCC): ICD-10-CM

## 2024-11-13 DIAGNOSIS — M86.18 OTHER ACUTE OSTEOMYELITIS, OTHER SITE (HCC): ICD-10-CM

## 2024-11-13 DIAGNOSIS — M86.9 OSTEOMYELITIS OF LEFT LOWER EXTREMITY (HCC): ICD-10-CM

## 2024-11-13 DIAGNOSIS — L89.623 PRESSURE INJURY OF LEFT HEEL, STAGE 3 (HCC): ICD-10-CM

## 2024-11-13 DIAGNOSIS — T81.31XD POSTOPERATIVE WOUND DEHISCENCE, SUBSEQUENT ENCOUNTER: ICD-10-CM

## 2024-11-13 DIAGNOSIS — L89.314 PRESSURE INJURY OF RIGHT ISCHIUM, STAGE 4 (HCC): ICD-10-CM

## 2024-11-13 PROCEDURE — 3074F SYST BP LT 130 MM HG: CPT | Performed by: STUDENT IN AN ORGANIZED HEALTH CARE EDUCATION/TRAINING PROGRAM

## 2024-11-13 PROCEDURE — 11042 DBRDMT SUBQ TIS 1ST 20SQCM/<: CPT

## 2024-11-13 PROCEDURE — 97605 NEG PRS WND THER DME<=50SQCM: CPT

## 2024-11-13 PROCEDURE — 11043 DBRDMT MUSC&/FSCA 1ST 20/<: CPT

## 2024-11-13 PROCEDURE — 11043 DBRDMT MUSC&/FSCA 1ST 20/<: CPT | Performed by: STUDENT IN AN ORGANIZED HEALTH CARE EDUCATION/TRAINING PROGRAM

## 2024-11-13 PROCEDURE — 3079F DIAST BP 80-89 MM HG: CPT | Performed by: STUDENT IN AN ORGANIZED HEALTH CARE EDUCATION/TRAINING PROGRAM

## 2024-11-14 NOTE — PATIENT INSTRUCTIONS
-Keep your wound dressing clean, dry, and intact. Only change dressing if it's over saturated, soiled or falls off.     -Wound vac may not have any drainage in tube or cannister & it will still be working.   Change cannister if it does become full by pressing tab on side of machine to remove canister and snap on new one. Full canister can be thrown in the trash. If cannister fills with bright red blood - go to ER. Dressing will be changed every MWF at the wound clini.  If you are having issues with your wound VAC, please consider patching leaks, changing the canister, or calling [phone] for troubleshooting. If the wound VAC has been off or un-operational for over 2 hours, call wound care center to inform them and remove all dressings including black foam and replace with normal saline damp gauze.     -Should you experience any significant changes in your wound(s), such as infection (redness, swelling, localized heat, increased pain, fever > 101 F, chills) or have any questions regarding your home care instructions, please contact the wound center at [phone]. If after hours, contact your primary care physician or go to the hospital emergency room.  If you are admitted to any hospital, you will need a new referral to come back to the wound clinic and any scheduled appointments that you already have, may be cancelled.

## 2024-11-14 NOTE — PROCEDURES
Wound vac removed by CNA, wound beds inspected by this RN, no retained foam noted. Wound vac >50cm applied to right ischium wound and left ischium wound using 3 pieces of black foam. 1 piece of black foam to fill right ischium wound and bridge to right hip/accommodate track pad, 1 piece of black foam to fill left ischium wound, 1 piece of foam to bridge together two wounds and bridge to right hip.     Negative pressure wound therapy resumed at 125mmHg continuous pressure with no leaks noted.   Small sacral offloading dressings to cover each wound, and 1 fenestrated and placed around wound vac track pad.   Large sacral offloading dressing to sacrum.

## 2024-11-14 NOTE — PROGRESS NOTES
Provider Encounter- Pressure Injury        HISTORY OF PRESENT ILLNESS  Wound History:    START OF CARE IN CLINIC: 1/31/2024 (return to clinic after hospitalization)    REFERRING PROVIDER: Racquel York       WOUNDS-superior coccyx pressure injury, stage IV-resolved                                 Coccyx pressure injury, stage IV-resolved           Inferior coccyx pressure injury, stage IV resolved                                 Right ischial pressure injury, stage IV                                 Left heel pressure injury, recurring stage III-resolved                                 Left posterior lower leg/calf-stage III-resolved                                 Left medial lower leg surgical wound dehiscence-resolved, reopens frequently-resolved            Left ischial pressure injury, stage IV-first observed in clinic 5/1/2024          HISTORY: Patient with history of incomplete quadriplegia referred to [facility] for treatment of a stage IV pressure injury.  He has a history of previous pressure injuries to this area, and underwent muscle flaps in 2019, and then again in 2020.  He was seen in the wound clinic in November 2021 for an ulcer proximal from his current ulcer, and pressure injuries to his left posterior lower leg and left heel.  At that time, it was discovered that the patient had retained VAC foam embedded in the wound bed of the sacral wound.  Attempts were made to get him back to his plastic surgeon, though unsuccessful.  In January he underwent surgical removal of VAC sponge along with excisional debridement of his sacral wound by Dr. Chaves.  After the surgery, his wound went on to heal without incident.   In early April 2022, his home health nurse noted a new sacral ulcer, below the previous ulcer which quickly tripled in size over the following weeks.  The ulcer to his left medial lower leg had also deteriorated, with bone visible at the base..  He was hospitalized from 4/22 until 4/27/2022 and  underwent surgery with Dr. Raman on 4/26 for irrigation and debridement of multiple compartments of the left lower extremity, bone excision, and complex closure of chronic wound using biologic skin substitute.   His sacrococcygeal wound was not surgically addressed during this admission.  He was discharged back to his group home, with home health, and referral to outpatient wound clinic for his sacral wound.  He was instructed to follow-up with his surgeon for his lower leg wound.       Postoperatively, the left medial lower leg incision dehisced.  He was seen by his surgeon at [facility] on 5/11.  The surgeon opted to leave remaining sutures in place, and refer him to the wound clinic for treatment of this wound.   Treatment of this wound was initiated in clinic on 5/12.  During this visit was also noted that his heel DTI had resolved, but that he had a new pressure injury to his left posterior lower extremity.     A new pressure injury was noted to patient's right upper buttock/lower back on 5/20/2022.  Wound was linear in shape, skin discolored but intact.     Abrasion noted to left anterior lower leg.  First observed in clinic on 7/22/2022.  Patient states he bumped his leg into his food tray.     Small DTI noted to patient's left lateral lower leg on 7/29/2022.  Skin intact but discolored.     Large area of deep tissue injury noted to patient's left exterior lower leg.  Patient denied any trauma to this area.  Skin intact.  Wound documented.    1/27/2023: Patient was admitted to [facility] from 1/23-1/25/2023 with gross hematuria. He underwent RICHARD which showed watchman device was in place and he was taken off of Xarelto. While hospitalized wound team was consulted. He was referred back to [facility] and home health upon discharge.    Patient was hospitalized at [facility] for pyelonephritis from 2/26 until 3/2/2023, admitted for fever and general malaise.  He was admitted and initially started on linezolid and meropenem for suspected  UTI and history of multidrug-resistant organisms.  Urine cultures were negative. ID was consulted, recommended CT of chest and abdomen,which were negative for acute findings. However, he was treated with 5 days total course of antibiotics for suspected UTI, and symptoms completely resolved.  During this admission, the inpatient wound team was consulted for treatment of his sacral and lower leg wounds.  A wound culture was taken from his left heel pressure ulcer, negative.  Once stabilized, he was discharged home and referred back to [facility] to resume treatment of his wounds.    Patient was hospitalized at [facility] from 12/11 until 12/23/2023, admitted for fever.  Wound infection suspected.  CT scan of abdomen and pelvis for evaluation of sacral pressure injury showed gas tracking down to the bone consistent with osteomyelitis.  He underwent I&D of right ischial ulcer (documented as buttock) with Dr. Bansal, medial tract leading to an abscess was identified.  Cavity was opened allowing it to drain into the main wound bed.  Wound VAC was placed and managed by wound team during this admission.  A bone scan of patient's left foot was also done, initially concerning for osteomyelitis.  Orthopedic surgery was consulted and did not recommend surgical intervention. ID consulted also, recommended the patient to receive IV ertapenem 1 g every 24 hours plus IV daptomycin 8 mg/kg every 24 hours through 1/22/2024.   He was discharged to [facility] on 1/23 for IV antibiotics and wound care.  From the LTAC he was discharged home on 1/22 with home health and referral back to [facility] to resume management of his wounds  .      Pertinent Medical History: Incomplete quadriplegia, history of stage IV pressure injuries, history of flap procedures to sacral pressure injuries, osteomyelitis, obesity, colostomy in place   Contributing factors: Immobility and Obesity, impaired sensation    Personal support: Attendant-staff at senior care and home health  nursing    TOBACCO USE:   Former smoker, quit in 1977.  Never used smokeless tobacco    Patient's problem list, allergies, and current medications reviewed and updated in Epic    Interval History:  Interval History thinned 7/29/2022.  Please see previous notes for complete interval history.   Interval History thinned 1/27/2023. Please see previous note for complete interval history.  Interval History thinned 3/3/2023.  Please see previous notes for complete interval history.    Interval History thinned 8/4/2023.  Please see previous notes for complete interval history.    Interval History thinned 1/31/2024.  Please see previous notes for complete interval history.    Interval History thinned 6/26/2024.  Please see previous notes for complete interval history.      6/19/2024: Clinic visit with Steve Hodges MD. Patient denies any fevers or chills. Reports he is tolerating Abx. He has appointment with ID later today to discuss OM treatment. He is tolerating wound VAC. Slight bone exposed bilaterally in ischial wounds, slightly worse.    6/26/2024 : Clinic visit with ADILSON Arthur, FNPEGGY-BC, CWDINESHN, CFTRACI.   Patient presents today with significant deterioration of his both ischial wounds, with increased depth of undermining.   He now has a PICC line, and will be starting outpatient infusions of ertapenem later today.  He states he is feeling well, denies fevers, chills, nausea, vomiting, cough or shortness of breath.  Due to deterioration of his wound, we did discuss possibly having him go over the hospital for surgical debridement and IV antibiotics.  He was hesitant to do so.  We agreed to see how he looks after a week or so IV antibiotics.  He is agreeable to minimizing time up in his wheelchair.  He also agrees to go to the emergency room immediately if he develops any signs or symptoms of infection.    7/10/2024: Clinic visit with Steve Hodges MD. Patient reports feeling in normal state of health. He missed  last appointment as he had fall out of wheelchair and was evaluated in ED. He denies any injuries from fall. Patient wounds are measuring slightly smaller. He continues on Ertapenem. Patient reports that he has appointment for seating evaluation from [facility] scheduled, but does not recall the date.    7/17/2024 : Clinic visit with ADILSON Arthur, HOLDEN, LILIYA VALERIO.   Anjum states he is feeling well.  Left ischial wound measures significantly smaller today, however measurements vary somewhat depending on pt's position.  Both wounds appear to be progressing slightly, with improving tissue quality.    7/24/2024 : Clinic visit with ADILSON Arthur, HOLDEN, IMAN, LILIYA.   Patient continues to feel well, offers no complaints.  Right ischial wound measures smaller, left ischial wound is larger.  Patient tolerating VAC without any difficulty.  Home health continues to see him in between clinic visits.  He is still receiving daily infusions of IV antibiotics, will be completing these in August.    7/31/2024 : Clinic visit with ADILSON Arthur, HOLDEN, IMAN, LILIYA.   Anjum continues to feel well, his wounds are slowly progressing.  Tolerating VAC.  He is on IV antibiotics for 1 more week.  Admits that he is up in his wheelchair for 10 to 14 hours/day.    8/7/2024 : Clinic visit with ADILSON Arthur FNP-BC, LILIYA VALERIO.   Anjum states he is feeling well today, offers no complaints.  Both wounds measure a bit smaller today, new granulation tissue noted.  He will be getting his last IV antibiotic infusion today.    8/14/2024 : Clinic visit with ADILSON Arthur FNP-BC, IMAN, LILIYA.   Patient continues to feel well.  He has completed his antibiotics, followed up with ID earlier this week, no further antibiotic therapy at this time.  Ischial wounds are slowly progressing.  Lower extremity wounds remain resolved.    8/21/2024 : Clinic visit with ADILSON Arthur FNP-BC, LILIYA VALERIO.   Anjum states he is feeling  well, offers no complaints.  His wounds are slowly progressing, increased granulation.    8/28/2024 : Clinic visit with ADILSON Arthur, HOLDEN, LILIYA VALERIO.   Turk states he is feeling well overall.  His wounds measure slightly smaller.  He has had some urinary symptoms, reports leakage from around his suprapubic catheter last night, and excessively full urine bag.  He has been in contact with his urologist office.  He also mentions that he has a recurring small scab to his nose, states that it falls off only to return shortly after.  This has been going on for several months.  I offered to refer him to dermatology.    9/11/2024 : Clinic visit with ADILSON Arthur, HOLDEN, LILIYA VALERIO.   Turk states he is feeling well.  He missed his appointment last week due to car troubles.  His vehicle is now running well.  Ischial wounds show some improvement, increased granulation tissue.  He does have a small reopening of left posterior lower leg wound, appears to be a small abrasion over area of scar tissue.    9/18/2024: Clinic visit with Steve Hodges MD. Patient reports doing ok. Denies any acute issues with wound VAC. Reports that he was fitted by [employer] for new seat cushion and is being manufactured, he is not sure when will be ready. Patient's wounds appears slightly improved. Left posterior leg wound remains open.    9/25/2024 : Clinic visit with ADILSON Arthur, HOLDEN, IMAN, LILIYA.   Patient states he is feeling well.  His wounds measure about the same.    10/2/2024: Clinic visit with HOLDEN Johnson CWON, LILIYA.  Pt denies fevers, chills, nausea, vomiting. Bilateral ischial ulcers increased in area.  Left IT quality overall worse compared to right IT.  Recommend Dakin's soak.  Continue with VAC to bilateral IT.    10/9/2024 : Clinic visit with ADILSON Arthur, HOLDEN, IMAN, LILIYA.   Patient continues to feel well overall.  His wounds measure about the same, no evidence of infection.   He does have some minor breakdown over the scar tissue of his sacrum which bears watching.  He has not heard from [employer] about his seat cushion, states he will contact them again.    10/16/2024 : Clinic visit with ADILSON Arthur, HOLDEN, IMAN, LILIYA.   Anjum continues to feel well.  His wounds measure slightly smaller.  Sacral wound just slightly open.  He called [employer] earlier this week, was told his new cushion is ready, and that they would deliver it next Monday.  He also states he is due for a new wheelchair, but has not yet become process.      10/23/2024 : Clinic visit with ADILSON Arthur, HOLDEN, IMAN, LLIIYA.   Anjum's wounds present today with significantly more odor.  He states he is feeling fine, denies fevers, chills, nausea, vomiting, cough or shortness of breath.  Increased drainage noted.  Wounds measure about the same.  Culture collected in clinic today after debridement.  VAC placed on hold.  Wounds packed with Puracyn moistened silver Hydrofiber.   Patient reminded that he is to go to the emergency room if he notices any increased redness, swelling, drainage or odor, or if he develops fever, chills, nausea or vomiting.    He has a new cushion to his wheelchair.  States that he does not yet have a new wheelchair, will be picking on out the next few weeks.    10/30/2024 : Clinic visit with ADILSON Arthur, HOLDEN, IMAN, LILIYA.   Anjum states he is feeling well.  Wound odor still noticeable.  Culture collected last visit was positive for light growth of Proteus.  Given continued odor, will go ahead and prescribe short course of Augmentin, no other p.o. options available based on sensitivities.  Continue with Dakin's wound cleansing.  Home health to restart VAC on Friday 11/6/2024: Clinic visit with Steve Hodges MD. Patient reports doing well, denies any acute issues. Tolerating augmentin well without side effects. Odor much improved today. Wounds are improving slowly. Continue wound  VAC.    11/14/2024: Clinic visit with Steve Hodges MD. Patient reports doing ok. He was frustrated coming into clinic, was having trouble exiting his van. No evidence of wound infection today    REVIEW OF SYSTEMS:   Unchanged from previous wound clinic assessment on 11/6/2024, except as noted in interval history above.      PHYSICAL EXAMINATION:   /86   Pulse (!) 58   Temp 36.1 °C (97 °F) (Temporal)   Resp 18   SpO2 96%   Physical Exam  Constitutional:       Appearance: He is obese.   Cardiovascular:      Rate and Rhythm: Normal rate.   Pulmonary:      Effort: Pulmonary effort is normal.   Abdominal:      Comments: Colostomy left lower quadrant   Genitourinary:     Comments: Suprapubic catheter to down drain   Skin:     Comments: Stage IV pressure injury to right ischium: Wound slightly improved. Tissue quality improved, increased granulation.  Moderate serous drainage, slight odor.  No periwound erythema or induration    Stage IV pressure injury of left ischium: Wound slowly improving, Slough to wound bed.  Moderate serosanguineous drainage.  Improved odor.  No periwound erythema or induration    Sacral wound remains resolved  All other wounds remain healed, high risk for recurrence     Neurological:      Mental Status: He is alert and oriented to person, place, and time.   Psychiatric:         Mood and Affect: Mood normal.       WOUND ASSESSMENT  Wound 12/12/23 Pressure Injury Ischium Right (Active)   Wound Image    11/13/24 1614   Site Assessment Pink;Red 11/13/24 1614   Periwound Assessment Scar tissue 11/13/24 1614   Margins Unattached edges 11/13/24 1614   Closure Secondary intention 10/16/24 1700   Drainage Amount Moderate 11/13/24 1614   Drainage Description Serosanguineous 11/13/24 1614   Treatments Cleansed;Provider debridement;Site care 11/13/24 1614   Offloading/DME Other (comment) 10/30/24 1530   Wound Cleansing Hypochlorus Acid 11/13/24 1614   Periwound Protectant Skin Protectant Wipes to  Periwound;Drape 11/13/24 1614   Dressing Status Intact;Old drainage 10/02/24 1500   Dressing Changed New 10/30/24 1530   Dressing Cleansing/Solutions Not Applicable 11/13/24 1614   Dressing Options Wound Vac;Offloading Dressing - Sacral 11/13/24 1614   Dressing Change/Treatment Frequency Monday, Wednesday, Friday, and As Needed 11/13/24 1614   Wound Team Following Weekly 10/16/24 1700   WOUND NURSE ONLY - Pressure Injury Stage 4 11/13/24 1614   Wound Length (cm) 3.4 cm 11/13/24 1614   Wound Width (cm) 1.6 cm 11/13/24 1614   Wound Depth (cm) 1 cm 11/13/24 1614   Wound Surface Area (cm^2) 5.44 cm^2 11/13/24 1614   Wound Volume (cm^3) 5.44 cm^3 11/13/24 1614   Post-Procedure Length (cm) 0.7 cm 11/13/24 1614   Post-Procedure Width (cm) 1 cm 11/13/24 1614   Post-Procedure Depth (cm) 1.2 cm 11/06/24 1630   Post-Procedure Surface Area (cm^2) 0.7 cm^2 11/13/24 1614   Post-Procedure Volume (cm^3) 8.82 cm^3 11/06/24 1630   Wound Healing % 90 11/13/24 1614   Tunneling (cm) 0 cm 11/13/24 1614   Tunneling Clock Position of Wound 2 07/10/24 1615   Undermining (cm) 2 cm 11/13/24 1614   Undermining of Wound, 1st Location From 11 o'clock;To 2 o'clock 11/13/24 1614   Undermining (cm) - 2nd location 0 cm 09/25/24 1625   Undermining of Wound, 2nd Location From 12 o'clock;To 2 o'clock 08/14/24 1545   Wound Odor None 11/13/24 1614   Exposed Structures None 11/13/24 1614   Number of days: 338       Wound 05/01/24 Pressure Injury Ischium Left (Active)   Wound Image    11/13/24 1614   Site Assessment Pink;Red;Yellow 11/13/24 1614   Periwound Assessment Scar tissue 11/13/24 1614   Margins Unattached edges 11/13/24 1614   Closure Secondary intention 09/18/24 1500   Drainage Amount Moderate 11/13/24 1614   Drainage Description Serosanguineous 11/13/24 1614   Treatments Cleansed;Provider debridement;Site care 11/13/24 1614   Offloading/DME Other (comment) 10/30/24 1530   Wound Cleansing Hypochlorus Acid 11/13/24 1614   Periwound Protectant Skin  Protectant Wipes to Periwound;Hydrofiber;Drape 11/13/24 1614   Dressing Changed New 10/30/24 1530   Dressing Cleansing/Solutions Not Applicable 11/13/24 1614   Dressing Options Wound Vac;Offloading Dressing - Sacral 11/13/24 1614   Dressing Change/Treatment Frequency Monday, Wednesday, Friday, and As Needed 11/13/24 1614   Wound Team Following Weekly 10/16/24 1700   WOUND NURSE ONLY - Pressure Injury Stage 4 11/13/24 1614   Non-staged Wound Description Not applicable 09/18/24 1600   Wound Length (cm) 4 cm 11/13/24 1614   Wound Width (cm) 3 cm 11/13/24 1614   Wound Depth (cm) 1.6 cm 11/13/24 1614   Wound Surface Area (cm^2) 12 cm^2 11/13/24 1614   Wound Volume (cm^3) 19.2 cm^3 11/13/24 1614   Post-Procedure Length (cm) 4.1 cm 11/13/24 1614   Post-Procedure Width (cm) 3 cm 11/13/24 1614   Post-Procedure Depth (cm) 1.6 cm 11/13/24 1614   Post-Procedure Surface Area (cm^2) 12.3 cm^2 11/13/24 1614   Post-Procedure Volume (cm^3) 19.68 cm^3 11/13/24 1614   Wound Healing % -8627 11/13/24 1614   Tunneling (cm) 0 cm 11/13/24 1614   Undermining (cm) 2 cm 11/13/24 1614   Undermining of Wound, 1st Location From 10 o'clock;To 2 o'clock 11/13/24 1614   Wound Odor None 11/13/24 1614   Exposed Structures None 11/13/24 1614   Number of days: 197       PROCEDURE: Excisional debridement of right and left ischial wounds  -2% viscous lidocaine applied topically to wound bed for approximately 5 minutes prior to debridement  -Curette used to debride wound bed.  Excisional debridement was performed to remove devitalized tissue until healthy, bleeding tissue was visualized.  Total area debrided was approximately 13 cm², including into undermined areas of wounds.  Tissue debrided into the muscle / fascia layer.    -Bleeding controlled with manual pressure.    -Wound care completed by wound RN, refer to flowsheet  -Patient tolerated the procedure well, without c/o pain or discomfort.    -No excisional debridement of sacral wound  "required    Pertinent Labs and Diagnostics:    Labs:     A1c: No results found for: \"HBA1C\"     [facility] results, 7/1/2022 (under media tab)    CRP 13    ESR 31      IMAGING:     X-ray left tib-fib ordered 2/16/2024 through [facility]    12/11/2023-CT of abdomen pelvis with contrast  IMPRESSION:   1.  Right ischial decubitus ulcer extending to bone with soft tissue gas tracking along the right perineum. Appearance suggesting osteomyelitis, consider component of necrotizing fasciitis as clinically appropriate.  2.  Small pericardial effusion  3.  Left adrenal nodule, density on prior noncontrast CT demonstrates adenoma.  4.  Hepatomegaly  5.  Enlarged prostate, workup and evaluation for causes of prostate enlargement recommended as clinically appropriate.  6.  Atherosclerosis and atherosclerotic coronary artery disease    12/15/2023-bone scan of left foot  IMPRESSION:     1.  Mild increased activity in the LEFT 1st and 3rd toes on blood pool and delayed images possibly indicating inflammation/infection.  2.  No significant blood flow asymmetry.          VASCULAR STUDIES: No results found.    LAST  WOUND CULTURE:   Lab Results   Component Value Date/Time    CULTRSULT Heavy growth usual skin ade. (A) 10/23/2024 03:36 PM    CULTRSULT (A) 10/23/2024 03:36 PM     Proteus mirabilis ESBL  Light growth  Extended Spectrum Beta-lactamase (ESBL) isolated.  ESBL's may be clinically resistant to therapy with  Penicillins,Cephalosporins or Aztreonam despite  apparent in vitro susceptibility to some of these agents.  The patient requires contact isolation.  Please contact pharmacy or an Infectious Disease Specialist  if you have any questions about appropriate therapy.        PATHOLOGY  2/17/2023-bone fragment extracted from left lower extremity wound\\  FINAL DIAGNOSIS:     A. Left leg bone fragment at base of chronic wound:          Extensively degenerated fibrocartilaginous tissue with a rim of           " fibrinopurulent debris          Correlate with culture findings            Comment: While no residual intact bone is identified, these           findings are suggestive of adjacent osteomyelitis.      ASSESSMENT AND PLAN:     1. Sacral decubitus ulcer, stage IV ([facility])  Comments: Ulcer first noted in early April 2022 as small open area which quickly enlarged.  This ulcer is present distal from previous sacral ulcer which healed after surgery in January.  Patient has history of flap reconstruction x2 to this area.    11/13/2024: Remains healed, high risk for recurrence  -Monitor sacral skin each clinic visit  -Patient does spend a lot of time up in his wheelchair, and wishes to continue to do so for his quality of life.  He lives independently, drives, and is involved with family activities.    -His presents today with a new cushion in his wheelchair.  Has yet to pick out a new wheelchair  - Known OM that was previously treated. CT scan done during recent hospitalization did not show OM of sacrum, however OM of the ischium noted.  -Patient is very well versed in pressure relief strategies    Wound care: Silicone foam pressure relieving dressing    2. Pressure injury of right ischium, stage 4 ([facility])  Comments: Abscess and OM found on CT during hospitalization in December 2023.  Patient underwent I&D with VAC placement.  IV antibiotics through 1/22/2024.    11/13/2024: Wound slightly improved with improved tissue quality, continues to have undermining  - Excisional debridement of nonviable tissue from wound in clinic today, medically necessary to promote wound healing  - Home health  to continue Dakins soaked gauze to wound for approximately 10 minutes with each dressing change  - Continue wound VAC  -Patient to return to clinic weekly for assessment, debridement, and dressing change.    -Home health to change dressing 2 times per week in between clinic visits.    -Patient is very well versed on pressure relief measures,  has adequate surface on bed, alternating low air loss.  - Patient tolerating Augmentin, odor has resolved.    Wound care: NPWT -125mmHg continuous, black foam    3. Pressure injury of left ischium, stage 4 ([facility])    11/13/2024: Wound slightly improved with improved tissue quality  - Excisional debridement of wound in clinic today, medically necessary to promote wound healing.  - Patient to return to clinic weekly for assessment, debridement, and dressing change  - Continue VAC  -Home health to change dressing 2 times per week in between clinic visits.    -Patient has new cushion in his wheelchair, see above  -Patient is very well versed on pressure relief measures, has adequate surface on bed, alternating low air loss.    Wound care: NPWT -125mmHg continuous, black foam    4. Other acute osteomyelitis, other site ([facility])    11/13/2024: Bone fragments removed during clinic visit in June were sent for pathology, polymicrobial, most concerning was an MDR ESBL Proteus.   -Patient completed IV antibiotics.  No further antibiotics at this time per ID  -Patient understands he has a very low threshold for infection/sepsis.  He understands he is to go to the emergency room immediately if he begins to experience fevers, chills, nausea or vomiting, or if he notices radiating erythema or purulent drainage from his wounds.  -ID following    5. Postoperative wound dehiscence, subsequent encounter  6. Osteomyelitis of left lower extremity ([facility])  Comments: On 4/26/2022 patient underwent irrigation and debridement of multiple compartments of the left lower extremity states for pressure injury, with bone excision, and complex closure of chronic wound using biologic skin substitute.  During postop visit in surgeons office on 5/11, surgical site was noted to be dehisced.      11/13/2024: Skin remains intact  - Wound waxes and wanes due to pressure and underlying known chronic OM  -Patient to be mindful of offloading when supine, should  assure that his leg is not rotating medially  -Monitor site each clinic visit  Wound care: Silicone foam dressing, Tubigrip D    7. Pressure injury of left foot, stage 4 ([facility])  8. Pressure injury of left leg, stage 3 ([facility])  9. Pressure injury of left heel, stage 3 ([facility])    11/13/2024: All of these wounds remain healed  -Monitor pressure points each clinic visit  - Patient aware of offloading.    10. Quadriplegia, C5-C7 incomplete ([facility])  Comments: Complicating factor.  Impaired mobility and sensation  -Patient is still spending 7-8 hours/day up in his wheelchair and knows to reposition frequently.  Wears heel float boots bilaterally at all times  - Patient reports that he has seating evaluation with [employer] upcoming.    Please note that this note may have been created using voice recognition software. I have worked with technical experts from [employer] to optimize the interface.  I have made every reasonable attempt to correct obvious errors, but there may be errors of grammar and possibly content that I did not discover before finalizing the note.

## 2024-11-18 NOTE — ANESTHESIA TIME REPORT
Anesthesia Start and Stop Event Times     Date Time Event    1/4/2022 1758 Ready for Procedure     1801 Anesthesia Start     1906 Anesthesia Stop        Responsible Staff  01/04/22    Name Role Begin End    Eric Naqvi M.D. Anesth 1801 1906        Preop Diagnosis (Free Text):  Pre-op Diagnosis     KIDNEY STONE        Preop Diagnosis (Codes):    Premium Reason  A. 3PM - 7AM    Comments:                                                                        Vaccine status unknown

## 2024-11-19 PROBLEM — I95.9 LOW BLOOD PRESSURE: Status: ACTIVE | Noted: 2024-11-19

## 2024-11-20 ENCOUNTER — OFFICE VISIT (OUTPATIENT)
Dept: WOUND CARE | Facility: MEDICAL CENTER | Age: 74
End: 2024-11-20
Payer: MEDICARE

## 2024-11-20 ENCOUNTER — OFFICE VISIT (OUTPATIENT)
Dept: DERMATOLOGY | Facility: IMAGING CENTER | Age: 74
End: 2024-11-20
Payer: MEDICARE

## 2024-11-20 VITALS
DIASTOLIC BLOOD PRESSURE: 80 MMHG | SYSTOLIC BLOOD PRESSURE: 130 MMHG | TEMPERATURE: 97.6 F | HEART RATE: 52 BPM | RESPIRATION RATE: 18 BRPM | OXYGEN SATURATION: 96 %

## 2024-11-20 DIAGNOSIS — L81.4 LENTIGINES: ICD-10-CM

## 2024-11-20 DIAGNOSIS — L89.154 SACRAL DECUBITUS ULCER, STAGE IV (HCC): ICD-10-CM

## 2024-11-20 DIAGNOSIS — T81.31XD POSTOPERATIVE WOUND DEHISCENCE, SUBSEQUENT ENCOUNTER: ICD-10-CM

## 2024-11-20 DIAGNOSIS — L89.893 PRESSURE INJURY OF LEFT LEG, STAGE 3 (HCC): ICD-10-CM

## 2024-11-20 DIAGNOSIS — L89.324 PRESSURE INJURY OF LEFT ISCHIUM, STAGE 4 (HCC): ICD-10-CM

## 2024-11-20 DIAGNOSIS — L89.623 PRESSURE INJURY OF LEFT HEEL, STAGE 3 (HCC): ICD-10-CM

## 2024-11-20 DIAGNOSIS — L89.894 PRESSURE INJURY OF LEFT FOOT, STAGE 4 (HCC): ICD-10-CM

## 2024-11-20 DIAGNOSIS — M86.18 OTHER ACUTE OSTEOMYELITIS, OTHER SITE (HCC): ICD-10-CM

## 2024-11-20 DIAGNOSIS — L89.314 PRESSURE INJURY OF RIGHT ISCHIUM, STAGE 4 (HCC): ICD-10-CM

## 2024-11-20 DIAGNOSIS — M86.9 OSTEOMYELITIS OF LEFT LOWER EXTREMITY (HCC): ICD-10-CM

## 2024-11-20 DIAGNOSIS — L57.0 ACTINIC KERATOSIS: ICD-10-CM

## 2024-11-20 DIAGNOSIS — L82.1 SK (SEBORRHEIC KERATOSIS): ICD-10-CM

## 2024-11-20 DIAGNOSIS — D22.9 MULTIPLE NEVI: ICD-10-CM

## 2024-11-20 DIAGNOSIS — D18.01 CHERRY ANGIOMA: ICD-10-CM

## 2024-11-20 DIAGNOSIS — G82.54 QUADRIPLEGIA, C5-C7, INCOMPLETE (HCC): ICD-10-CM

## 2024-11-20 PROCEDURE — 17000 DESTRUCT PREMALG LESION: CPT | Performed by: NURSE PRACTITIONER

## 2024-11-20 PROCEDURE — 97605 NEG PRS WND THER DME<=50SQCM: CPT

## 2024-11-20 PROCEDURE — 99213 OFFICE O/P EST LOW 20 MIN: CPT | Mod: 25 | Performed by: NURSE PRACTITIONER

## 2024-11-20 PROCEDURE — 11043 DBRDMT MUSC&/FSCA 1ST 20/<: CPT

## 2024-11-20 NOTE — PROGRESS NOTES
Provider Encounter- Pressure Injury        HISTORY OF PRESENT ILLNESS  Wound History:    START OF CARE IN CLINIC: 1/31/2024 (return to clinic after hospitalization)    REFERRING PROVIDER: Racquel York       WOUNDS-superior coccyx pressure injury, stage IV-resolved                                 Coccyx pressure injury, stage IV-resolved           Inferior coccyx pressure injury, stage IV resolved                                 Right ischial pressure injury, stage IV                                 Left heel pressure injury, recurring stage III-resolved                                 Left posterior lower leg/calf-stage III-resolved                                 Left medial lower leg surgical wound dehiscence-resolved, reopens frequently-resolved            Left ischial pressure injury, stage IV-first observed in clinic 5/1/2024          HISTORY: Patient with history of incomplete quadriplegia referred to [facility] [place] for treatment of a stage IV pressure injury.  He has a history of previous pressure injuries to this area, and underwent muscle flaps in 2019, and then again in 2020.  He was seen in the wound clinic in November 2021 for an ulcer proximal from his current ulcer, and pressure injuries to his left posterior lower leg and left heel.  At that time, it was discovered that the patient had retained VAC foam embedded in the wound bed of the sacral wound.  Attempts were made to get him back to his plastic surgeon, though unsuccessful.  In January he underwent surgical removal of VAC sponge along with excisional debridement of his sacral wound by Dr. Chaves.  After the surgery, his wound went on to heal without incident.   In early April 2022, his home health nurse noted a new sacral ulcer, below the previous ulcer which quickly tripled in size over the following weeks.  The ulcer to his left medial lower leg had also deteriorated, with bone visible at the base..  He was hospitalized from 4/22 until 4/27/2022 and  underwent surgery with Dr. Raman on 4/26 for irrigation and debridement of multiple compartments of the left lower extremity, bone excision, and complex closure of chronic wound using biologic skin substitute.   His sacrococcygeal wound was not surgically addressed during this admission.  He was discharged back to his group home, with home health, and referral to outpatient wound clinic for his sacral wound.  He was instructed to follow-up with his surgeon for his lower leg wound.       Postoperatively, the left medial lower leg incision dehisced.  He was seen by his surgeon at [facility] on 5/11.  The surgeon opted to leave remaining sutures in place, and refer him to the wound clinic for treatment of this wound.   Treatment of this wound was initiated in clinic on 5/12.  During this visit was also noted that his heel DTI had resolved, but that he had a new pressure injury to his left posterior lower extremity.     A new pressure injury was noted to patient's right upper buttock/lower back on 5/20/2022.  Wound was linear in shape, skin discolored but intact.     Abrasion noted to left anterior lower leg.  First observed in clinic on 7/22/2022.  Patient states he bumped his leg into his food tray.     Small DTI noted to patient's left lateral lower leg on 7/29/2022.  Skin intact but discolored.     Large area of deep tissue injury noted to patient's left exterior lower leg.  Patient denied any trauma to this area.  Skin intact.  Wound documented.    1/27/2023: Patient was admitted to [facility] from 1/23-1/25/2023 with gross hematuria. He underwent RICHARD which showed watchman device was in place and he was taken off of Xarelto. While hospitalized wound team was consulted. He was referred back to [facility] and home health upon discharge.    Patient was hospitalized at [facility] for pyelonephritis from 2/26 until 3/2/2023, admitted for fever and general malaise.  He was admitted and initially started on linezolid and meropenem for suspected  UTI and history of multidrug-resistant organisms.  Urine cultures were negative. ID was consulted, recommended CT of chest and abdomen,which were negative for acute findings. However, he was treated with 5 days total course of antibiotics for suspected UTI, and symptoms completely resolved.  During this admission, the inpatient wound team was consulted for treatment of his sacral and lower leg wounds.  A wound culture was taken from his left heel pressure ulcer, negative.  Once stabilized, he was discharged home and referred back to [facility] to resume treatment of his wounds.    Patient was hospitalized at [facility] from 12/11 until 12/23/2023, admitted for fever.  Wound infection suspected.  CT scan of abdomen and pelvis for evaluation of sacral pressure injury showed gas tracking down to the bone consistent with osteomyelitis.  He underwent I&D of right ischial ulcer (documented as buttock) with Dr. Bansal, medial tract leading to an abscess was identified.  Cavity was opened allowing it to drain into the main wound bed.  Wound VAC was placed and managed by wound team during this admission.  A bone scan of patient's left foot was also done, initially concerning for osteomyelitis.  Orthopedic surgery was consulted and did not recommend surgical intervention. ID consulted also, recommended the patient to receive IV ertapenem 1 g every 24 hours plus IV daptomycin 8 mg/kg every 24 hours through 1/22/2024.   He was discharged to [facility] on 1/23 for IV antibiotics and wound care.  From the LTAC he was discharged home on 1/22 with home health and referral back to [facility] to resume management of his wounds  .      Pertinent Medical History: Incomplete quadriplegia, history of stage IV pressure injuries, history of flap procedures to sacral pressure injuries, osteomyelitis, obesity, colostomy in place   Contributing factors: Immobility and Obesity, impaired sensation    Personal support: Attendant-staff at [facility] and home health  nursing    TOBACCO USE:   Former smoker, quit in 1977.  Never used smokeless tobacco    Patient's problem list, allergies, and current medications reviewed and updated in Epic    Interval History:  Interval History thinned 7/29/2022.  Please see previous notes for complete interval history.   Interval History thinned 1/27/2023. Please see previous note for complete interval history.  Interval History thinned 3/3/2023.  Please see previous notes for complete interval history.    Interval History thinned 8/4/2023.  Please see previous notes for complete interval history.    Interval History thinned 1/31/2024.  Please see previous notes for complete interval history.    Interval History thinned 6/26/2024.  Please see previous notes for complete interval history.      6/19/2024: Clinic visit with Steve Hodges MD. Patient denies any fevers or chills. Reports he is tolerating Abx. He has appointment with ID later today to discuss OM treatment. He is tolerating wound VAC. Slight bone exposed bilaterally in ischial wounds, slightly worse.    6/26/2024 : Clinic visit with ADILSON Arthur, FNPEGGY-BC, CWDINESHN, CFTRACI.   Patient presents today with significant deterioration of his both ischial wounds, with increased depth of undermining.   He now has a PICC line, and will be starting outpatient infusions of ertapenem later today.  He states he is feeling well, denies fevers, chills, nausea, vomiting, cough or shortness of breath.  Due to deterioration of his wound, we did discuss possibly having him go over the hospital for surgical debridement and IV antibiotics.  He was hesitant to do so.  We agreed to see how he looks after a week or so IV antibiotics.  He is agreeable to minimizing time up in his wheelchair.  He also agrees to go to the emergency room immediately if he develops any signs or symptoms of infection.    7/10/2024: Clinic visit with Steve Hodges MD. Patient reports feeling in normal state of health. He missed  last appointment as he had fall out of wheelchair and was evaluated in ED. He denies any injuries from fall. Patient wounds are measuring slightly smaller. He continues on Ertapenem. Patient reports that he has appointment for seating evaluation from [facility] scheduled, but does not recall the date.    7/17/2024 : Clinic visit with ADILSON Arthur, HOLDEN, LILIYA VALERIO.   Anjum states he is feeling well.  Left ischial wound measures significantly smaller today, however measurements vary somewhat depending on pt's position.  Both wounds appear to be progressing slightly, with improving tissue quality.    7/24/2024 : Clinic visit with ADILSON Arthur, HOLDEN, IMAN, LILIYA.   Patient continues to feel well, offers no complaints.  Right ischial wound measures smaller, left ischial wound is larger.  Patient tolerating VAC without any difficulty.  Home health continues to see him in between clinic visits.  He is still receiving daily infusions of IV antibiotics, will be completing these in August.    7/31/2024 : Clinic visit with ADILSON Arthur, HOLDEN, IMAN, LILIYA.   Anjum continues to feel well, his wounds are slowly progressing.  Tolerating VAC.  He is on IV antibiotics for 1 more week.  Admits that he is up in his wheelchair for 10 to 14 hours/day.    8/7/2024 : Clinic visit with ADILSON Arthur FNP-BC, LILIYA VALERIO.   Anjum states he is feeling well today, offers no complaints.  Both wounds measure a bit smaller today, new granulation tissue noted.  He will be getting his last IV antibiotic infusion today.    8/14/2024 : Clinic visit with ADILSON Arthur FNP-BC, IMAN, LILIYA.   Patient continues to feel well.  He has completed his antibiotics, followed up with ID earlier this week, no further antibiotic therapy at this time.  Ischial wounds are slowly progressing.  Lower extremity wounds remain resolved.    8/21/2024 : Clinic visit with ADILSON Arthur FNP-BC, LILIYA VALERIO.   Anjum states he is feeling  well, offers no complaints.  His wounds are slowly progressing, increased granulation.    8/28/2024 : Clinic visit with ADILSON Arthur, HOLDEN, LILIYA VALERIO.   Turk states he is feeling well overall.  His wounds measure slightly smaller.  He has had some urinary symptoms, reports leakage from around his suprapubic catheter last night, and excessively full urine bag.  He has been in contact with his urologist office.  He also mentions that he has a recurring small scab to his nose, states that it falls off only to return shortly after.  This has been going on for several months.  I offered to refer him to dermatology.    9/11/2024 : Clinic visit with ADILSON Arthur, HOLDEN, LILIYA VALERIO.   Turk states he is feeling well.  He missed his appointment last week due to car troubles.  His vehicle is now running well.  Ischial wounds show some improvement, increased granulation tissue.  He does have a small reopening of left posterior lower leg wound, appears to be a small abrasion over area of scar tissue.    9/18/2024: Clinic visit with Steve Hodges MD. Patient reports doing ok. Denies any acute issues with wound VAC. Reports that he was fitted by [employer] for new seat cushion and is being manufactured, he is not sure when will be ready. Patient's wounds appears slightly improved. Left posterior leg wound remains open.    9/25/2024 : Clinic visit with ADILSON Arthur, HOLDEN, IMAN, LILIYA.   Patient states he is feeling well.  His wounds measure about the same.    10/2/2024: Clinic visit with HOLDEN Johnson CWON, LILIYA.  Pt denies fevers, chills, nausea, vomiting. Bilateral ischial ulcers increased in area.  Left IT quality overall worse compared to right IT.  Recommend Dakin's soak.  Continue with VAC to bilateral IT.    10/9/2024 : Clinic visit with ADILSON Arthur, HOLDEN, IMAN, LILIYA.   Patient continues to feel well overall.  His wounds measure about the same, no evidence of infection.   He does have some minor breakdown over the scar tissue of his sacrum which bears watching.  He has not heard from [employer] about his seat cushion, states he will contact them again.    10/16/2024 : Clinic visit with ADILSON Arthur, HOLDEN, IMAN, LILIYA.   Anjum continues to feel well.  His wounds measure slightly smaller.  Sacral wound just slightly open.  He called [employer] earlier this week, was told his new cushion is ready, and that they would deliver it next Monday.  He also states he is due for a new wheelchair, but has not yet become process.      10/23/2024 : Clinic visit with ADILSON Arthur, HOLDEN, IMAN, LILIYA.   Anjum's wounds present today with significantly more odor.  He states he is feeling fine, denies fevers, chills, nausea, vomiting, cough or shortness of breath.  Increased drainage noted.  Wounds measure about the same.  Culture collected in clinic today after debridement.  VAC placed on hold.  Wounds packed with Puracyn moistened silver Hydrofiber.   Patient reminded that he is to go to the emergency room if he notices any increased redness, swelling, drainage or odor, or if he develops fever, chills, nausea or vomiting.    He has a new cushion to his wheelchair.  States that he does not yet have a new wheelchair, will be picking on out the next few weeks.    10/30/2024 : Clinic visit with ADILSON Arthur, HOLDEN, IMAN, LILIYA.   Anjum states he is feeling well.  Wound odor still noticeable.  Culture collected last visit was positive for light growth of Proteus.  Given continued odor, will go ahead and prescribe short course of Augmentin, no other p.o. options available based on sensitivities.  Continue with Dakin's wound cleansing.  Home health to restart VAC on Friday 11/6/2024: Clinic visit with Steve Hodges MD. Patient reports doing well, denies any acute issues. Tolerating augmentin well without side effects. Odor much improved today. Wounds are improving slowly. Continue wound  VAC.    11/14/2024: Clinic visit with Steve Hodges MD. Patient reports doing ok. He was frustrated coming into clinic, was having trouble exiting his van. No evidence of wound infection today    11/20/2024: Clinic visit with Steve Hodges MD. Patient reports feeling in normal state of health. His ischial wounds stable and slowly improving. He has reopened sacral wound however. Denies any signs or symptoms of infection.    REVIEW OF SYSTEMS:   Unchanged from previous wound clinic assessment on 11/14/2024, except as noted in interval history above.      PHYSICAL EXAMINATION:   /80   Pulse (!) 52   Temp 36.4 °C (97.6 °F) (Temporal)   Resp 18   SpO2 96%   Physical Exam  Constitutional:       Appearance: He is obese.   Cardiovascular:      Rate and Rhythm: Normal rate.   Pulmonary:      Effort: Pulmonary effort is normal.   Abdominal:      Comments: Colostomy left lower quadrant   Genitourinary:     Comments: Suprapubic catheter to down drain   Skin:     Comments: Stage IV pressure injury to right ischium: Wound slightly improved. Tissue quality improved, increased granulation.  Moderate serous drainage, slight odor.  No periwound erythema or induration    Stage IV pressure injury of left ischium: Wound slowly improving, Slough to wound bed.  Moderate serosanguineous drainage.  Improved odor.  No periwound erythema or induration    Stage IV pressure injury sacrum: Recurrence of wound. No slough, no granulation tissue. Probes to near bone.    All other wounds remain healed, high risk for recurrence     Neurological:      Mental Status: He is alert and oriented to person, place, and time.   Psychiatric:         Mood and Affect: Mood normal.         WOUND ASSESSMENT  Wound 12/12/23 Pressure Injury Ischium Right (Active)   Wound Image    11/20/24 1605   Site Assessment Pink;Red 11/20/24 1605   Periwound Assessment Scar tissue 11/20/24 1605   Margins Unattached edges 11/20/24 1605   Closure Secondary intention  10/16/24 1700   Drainage Amount Moderate 11/20/24 1605   Drainage Description Serosanguineous 11/20/24 1605   Treatments Cleansed;Provider debridement;Silver nitrate 11/20/24 1605   Offloading/DME Other (comment) 10/30/24 1530   Wound Cleansing Dakin's Solution 11/20/24 1605   Periwound Protectant No-sting Skin Prep;Drape 11/20/24 1605   Dressing Status Intact;Old drainage 10/02/24 1500   Dressing Changed Changed 11/20/24 1605   Dressing Cleansing/Solutions Not Applicable 11/20/24 1605   Dressing Options Wound Vac;Offloading Dressing - Sacral 11/20/24 1605   Dressing Change/Treatment Frequency Monday, Wednesday, Friday, and As Needed 11/20/24 1605   Wound Team Following Weekly 10/16/24 1700   WOUND NURSE ONLY - Pressure Injury Stage 4 11/20/24 1605   Wound Length (cm) 3.5 cm 11/20/24 1605   Wound Width (cm) 1.5 cm 11/20/24 1605   Wound Depth (cm) 1.1 cm 11/20/24 1605   Wound Surface Area (cm^2) 5.25 cm^2 11/20/24 1605   Wound Volume (cm^3) 5.775 cm^3 11/20/24 1605   Post-Procedure Length (cm) 3.5 cm 11/20/24 1605   Post-Procedure Width (cm) 1.6 cm 11/20/24 1605   Post-Procedure Depth (cm) 1.2 cm 11/20/24 1605   Post-Procedure Surface Area (cm^2) 5.6 cm^2 11/20/24 1605   Post-Procedure Volume (cm^3) 6.72 cm^3 11/20/24 1605   Wound Healing % 90 11/20/24 1605   Tunneling (cm) 0 cm 11/20/24 1605   Tunneling Clock Position of Wound 2 07/10/24 1615   Undermining (cm) 1.9 cm 11/20/24 1605   Undermining of Wound, 1st Location From 5 o'clock;To 1 o'clock 11/20/24 1605   Undermining (cm) - 2nd location 0 cm 09/25/24 1625   Undermining of Wound, 2nd Location From 12 o'clock;To 2 o'clock 08/14/24 1545   Wound Odor None 11/20/24 1605   Exposed Structures None 11/20/24 1605   Number of days: 344       Wound 05/01/24 Pressure Injury Ischium Left (Active)   Wound Image    11/20/24 1605   Site Assessment Pink;Yellow 11/20/24 1605   Periwound Assessment Scar tissue 11/20/24 1605   Margins Unattached edges 11/20/24 1605   Closure  Secondary intention 09/18/24 1500   Drainage Amount Large 11/20/24 1605   Drainage Description Serosanguineous 11/20/24 1605   Treatments Cleansed;Provider debridement;Silver nitrate 11/20/24 1605   Offloading/DME Other (comment) 10/30/24 1530   Wound Cleansing Dakin's Solution 11/20/24 1605   Periwound Protectant No-sting Skin Prep;Drape 11/20/24 1605   Dressing Changed Changed 11/20/24 1605   Dressing Cleansing/Solutions Not Applicable 11/20/24 1605   Dressing Options Wound Vac;Offloading Dressing - Sacral 11/20/24 1605   Dressing Change/Treatment Frequency Monday, Wednesday, Friday, and As Needed 11/20/24 1605   Wound Team Following Weekly 10/16/24 1700   WOUND NURSE ONLY - Pressure Injury Stage 4 11/20/24 1605   Non-staged Wound Description Not applicable 09/18/24 1600   Wound Length (cm) 4.3 cm 11/20/24 1605   Wound Width (cm) 2.5 cm 11/20/24 1605   Wound Depth (cm) 2 cm 11/20/24 1605   Wound Surface Area (cm^2) 10.75 cm^2 11/20/24 1605   Wound Volume (cm^3) 21.5 cm^3 11/20/24 1605   Post-Procedure Length (cm) 4.3 cm 11/20/24 1605   Post-Procedure Width (cm) 2.5 cm 11/20/24 1605   Post-Procedure Depth (cm) 2.1 cm 11/20/24 1605   Post-Procedure Surface Area (cm^2) 10.75 cm^2 11/20/24 1605   Post-Procedure Volume (cm^3) 22.575 cm^3 11/20/24 1605   Wound Healing % -9673 11/20/24 1605   Tunneling (cm) 0 cm 11/20/24 1605   Undermining (cm) 1.6 cm 11/20/24 1605   Undermining of Wound, 1st Location From 4 o'clock;To 11 o'clock 11/20/24 1605   Wound Odor None 11/20/24 1605   Exposed Structures None 11/20/24 1605   Number of days: 203       Wound 11/20/24 Pressure Injury Sacrum --Sacrum (Active)   Wound Image    11/20/24 1605   Site Assessment Red 11/20/24 1605   Periwound Assessment Fragile 11/20/24 1605   Margins Unattached edges 11/20/24 1605   Drainage Amount Small 11/20/24 1605   Drainage Description Serosanguineous 11/20/24 1605   Treatments Cleansed;Provider debridement 11/20/24 1605   Wound Cleansing Dakin's  "Solution 11/20/24 1605   Periwound Protectant No-sting Skin Prep;Moisture Barrier 11/20/24 1605   Dressing Changed New 11/20/24 1605   Dressing Cleansing/Solutions Not Applicable 11/20/24 1605   Dressing Options Hydrofiber Silver Strip;Hydrofiber Silver;Offloading Dressing - Sacral 11/20/24 1605   Dressing Change/Treatment Frequency Monday, Wednesday, Friday, and As Needed 11/20/24 1605   WOUND NURSE ONLY - Pressure Injury Stage 4 11/20/24 1605   Wound Length (cm) 0.5 cm 11/20/24 1605   Wound Width (cm) 0.5 cm 11/20/24 1605   Wound Depth (cm) 0.3 cm 11/20/24 1605   Wound Surface Area (cm^2) 0.25 cm^2 11/20/24 1605   Wound Volume (cm^3) 0.075 cm^3 11/20/24 1605   Post-Procedure Length (cm) 0.6 cm 11/20/24 1605   Post-Procedure Width (cm) 0.6 cm 11/20/24 1605   Post-Procedure Depth (cm) 0.3 cm 11/20/24 1605   Post-Procedure Surface Area (cm^2) 0.36 cm^2 11/20/24 1605   Post-Procedure Volume (cm^3) 0.108 cm^3 11/20/24 1605   Tunneling (cm) 0 cm 11/20/24 1605   Undermining (cm) 0 cm 11/20/24 1605   Wound Odor None 11/20/24 1605   Exposed Structures None 11/20/24 1605   Number of days: 0       PROCEDURE: Excisional debridement of right and left ischial wounds, sacral wound  -2% viscous lidocaine applied topically to wound bed for approximately 5 minutes prior to debridement  -Curette used to debride wound bed.  Excisional debridement was performed to remove devitalized tissue until healthy, bleeding tissue was visualized.  Total area debrided was approximately 16.71 cm², including into undermined areas of wounds.  Tissue debrided into the muscle / fascia layer.    -Bleeding controlled with manual pressure.    -Wound care completed by wound RN, refer to flowsheet  -Patient tolerated the procedure well, without c/o pain or discomfort.    -No excisional debridement of sacral wound required    Pertinent Labs and Diagnostics:    Labs:     A1c: No results found for: \"HBA1C\"     [facility] results, 7/1/2022 (under media " tab)    CRP 13    ESR 31      IMAGING:     X-ray left tib-fib ordered 2/16/2024 through [facility]    12/11/2023-CT of abdomen pelvis with contrast  IMPRESSION:   1.  Right ischial decubitus ulcer extending to bone with soft tissue gas tracking along the right perineum. Appearance suggesting osteomyelitis, consider component of necrotizing fasciitis as clinically appropriate.  2.  Small pericardial effusion  3.  Left adrenal nodule, density on prior noncontrast CT demonstrates adenoma.  4.  Hepatomegaly  5.  Enlarged prostate, workup and evaluation for causes of prostate enlargement recommended as clinically appropriate.  6.  Atherosclerosis and atherosclerotic coronary artery disease    12/15/2023-bone scan of left foot  IMPRESSION:     1.  Mild increased activity in the LEFT 1st and 3rd toes on blood pool and delayed images possibly indicating inflammation/infection.  2.  No significant blood flow asymmetry.          VASCULAR STUDIES: No results found.    LAST  WOUND CULTURE:   Lab Results   Component Value Date/Time    CULTRSULT Heavy growth usual skin ade. (A) 10/23/2024 03:36 PM    CULTRSULT (A) 10/23/2024 03:36 PM     Proteus mirabilis ESBL  Light growth  Extended Spectrum Beta-lactamase (ESBL) isolated.  ESBL's may be clinically resistant to therapy with  Penicillins,Cephalosporins or Aztreonam despite  apparent in vitro susceptibility to some of these agents.  The patient requires contact isolation.  Please contact pharmacy or an Infectious Disease Specialist  if you have any questions about appropriate therapy.        PATHOLOGY  2/17/2023-bone fragment extracted from left lower extremity wound\\  FINAL DIAGNOSIS:     A. Left leg bone fragment at base of chronic wound:          Extensively degenerated fibrocartilaginous tissue with a rim of           fibrinopurulent debris          Correlate with culture findings            Comment: While no residual intact bone is identified, these            findings are suggestive of adjacent osteomyelitis.      ASSESSMENT AND PLAN:     1. Sacral decubitus ulcer, stage IV ([facility])  Comments: Ulcer first noted in early April 2022 as small open area which quickly enlarged.  This ulcer is present distal from previous sacral ulcer which healed after surgery in January.  Patient has history of flap reconstruction x2 to this area.    11/20/2024: Wound re-occurrence this week. Wound recurrent stage IV pressure injury  - Excisional debridement was performed in clinic, medically necessary to promote wound healing.  - Initiate wound care.  -Patient does spend a lot of time up in his wheelchair, and wishes to continue to do so for his quality of life.  He lives independently, drives, and is involved with family activities.    -His presents today with a new cushion in his wheelchair.  Has yet to pick out a new wheelchair  - Known OM that was previously treated. CT scan done during recent hospitalization did not show OM of sacrum, however OM of the ischium noted.  -Patient is very well versed in pressure relief strategies    Wound care: Hydrofiber silver, silicone adhesive foam dressing    2. Pressure injury of right ischium, stage 4 ([facility])  Comments: Abscess and OM found on CT during hospitalization in December 2023.  Patient underwent I&D with VAC placement.  IV antibiotics through 1/22/2024.    11/20/2024: Wound slightly improved with improved tissue quality, continues to have undermining  - Excisional debridement of nonviable tissue from wound in clinic today, medically necessary to promote wound healing  - Home health  to continue Dakins soaked gauze to wound for approximately 10 minutes with each dressing change  - Continue wound VAC  -Patient to return to clinic weekly for assessment, debridement, and dressing change.    -Home health to change dressing 2 times per week in between clinic visits.    -Patient is very well versed on pressure relief measures, has adequate surface on  bed, alternating low air loss.  - Previously noted odor has resolved.    Wound care: NPWT -125mmHg continuous, black foam    3. Pressure injury of left ischium, stage 4 ([facility])    11/20/2024: Wound slightly improved with improved tissue quality  - Excisional debridement of wound in clinic today, medically necessary to promote wound healing.  - Patient to return to clinic weekly for assessment, debridement, and dressing change  - Continue VAC  -Home health to change dressing 2 times per week in between clinic visits.    -Patient has new cushion in his wheelchair, see above  -Patient is very well versed on pressure relief measures, has adequate surface on bed, alternating low air loss.    Wound care: NPWT -125mmHg continuous, black foam    4. Other acute osteomyelitis, other site ([facility])    11/20/2024: Bone fragments removed during clinic visit in June were sent for pathology, polymicrobial, most concerning was an MDR ESBL Proteus.   -Patient completed IV antibiotics.  No further antibiotics at this time per ID  -Patient understands he has a very low threshold for infection/sepsis.  He understands he is to go to the emergency room immediately if he begins to experience fevers, chills, nausea or vomiting, or if he notices radiating erythema or purulent drainage from his wounds.    5. Postoperative wound dehiscence, subsequent encounter  6. Osteomyelitis of left lower extremity ([facility])  Comments: On 4/26/2022 patient underwent irrigation and debridement of multiple compartments of the left lower extremity states for pressure injury, with bone excision, and complex closure of chronic wound using biologic skin substitute.  During postop visit in surgeons office on 5/11, surgical site was noted to be dehisced.      11/20/2024: Skin remains intact  - Wound waxes and wanes due to pressure and underlying known chronic OM  -Patient to be mindful of offloading when supine, should assure that his leg is not rotating medially  -Monitor  site each clinic visit  Wound care: Silicone foam dressing, Tubigrip D    7. Pressure injury of left foot, stage 4 ([facility])  8. Pressure injury of left leg, stage 3 ([facility])  9. Pressure injury of left heel, stage 3 ([facility])    11/20/2024: All of these wounds remain healed  -Monitor pressure points each clinic visit  - Patient aware of offloading.    10. Quadriplegia, C5-C7 incomplete ([facility])  Comments: Complicating factor.  Impaired mobility and sensation  -Patient is still spending 7-8 hours/day up in his wheelchair and knows to reposition frequently.  Wears heel float boots bilaterally at all times  - Patient reports that he has seating evaluation with [employer] upcoming.    Please note that this note may have been created using voice recognition software. I have worked with technical experts from [employer] to optimize the interface.  I have made every reasonable attempt to correct obvious errors, but there may be errors of grammar and possibly content that I did not discover before finalizing the note.

## 2024-11-20 NOTE — ED PROVIDER NOTES
"ED Provider Note    CHIEF COMPLAINT  Chief Complaint   Patient presents with    Urinary Catheter Problem       HPI  Osvaldo Pate is a 72 y.o. male who presents with blocked Cazares catheter.  Duration has been 1 day.  He states that it is clogged.  After flushing it is not improved.  There is no alleviating or exacerbating factors.  No associated fever or new discharge or back pain.    He states  with elevated blood pressure.  He states his blood pressure is elevated when his catheter is blocked    Is a very pleasant gentleman who has a suprapubic catheter secondary to paraplegia.  The patient states that it is changed every 3 weeks.  It is changed about 10 days ago.  He feels that it would probably block more frequently and require more frequent changes if he did not flush it every day he states that he missed one of his typical days where he flushes it.  And now subsequently it is clogged.    REVIEW OF SYSTEMS  General no fever chills  Dermatologic no new discharge or redness   no flank pain      PAST MEDICAL HISTORY  Past Medical History:   Diagnosis Date    A-fib ([facility])     Acute cystitis without hematuria 10/23/2021    Atrial flutter ([facility])     Blood clotting disorder ([facility])     Patient is on Xarelto    Bowel habit changes     Colostomy    GERD (gastroesophageal reflux disease)     Hypertension     Kidney stones     Neurogenic bladder     S/P cath    Open wound 11/18/2022    sacrum with wound vac, left ankle, [facility]    Quadriplegia, C5-C7 complete ([facility])     patient reports \" incomplete quad\"    Suprapubic catheter ([facility])        FAMILY HISTORY  Family History   Problem Relation Age of Onset    Heart Disease Father        SOCIAL HISTORY  Social History     Socioeconomic History    Marital status:    Tobacco Use    Smoking status: Former     Packs/day: 1.00     Years: 10.00     Pack years: 10.00     Types: Cigarettes     Start date: 1/1/1967     Quit date: 1/1/1977     Years " since quittin.9    Smokeless tobacco: Never   Vaping Use    Vaping Use: Never used   Substance and Sexual Activity    Alcohol use: Yes     Alcohol/week: 0.6 oz     Types: 1 Standard drinks or equivalent per week     Comment: Nightly    Drug use: No       SURGICAL HISTORY  Past Surgical History:   Procedure Laterality Date    IRRIGATION & DEBRIDEMENT GENERAL Left 2022    Procedure: IRRIGATION AND DEBRIDEMENT, WOUND - LEG;  Surgeon: Eric Raman M.D.;  Location: [facility];  Service: Orthopedics    WOUND CLOSURE NEURO Left 2022    Procedure: CLOSURE, WOUND;  Surgeon: Eric Raman M.D.;  Location: [facility];  Service: Orthopedics    ORTHOPEDIC OSTEOTOMY Left 2022    Procedure: OSTECTOMY;  Surgeon: Eric Raman M.D.;  Location: [facility];  Service: Orthopedics    INCISION AND DRAINAGE ORTHOPEDIC Left 2022    Procedure: INCISION AND DRAINAGE, WOUND, BY ORTHOPEDICS;  Surgeon: Erasmo Stewart M.D.;  Location: [facility];  Service: Orthopedics    BONE BIOPSY Left 2022    Procedure: BIOPSY, BONE;  Surgeon: Erasmo Stewart M.D.;  Location: [facility];  Service: Orthopedics    INCISION AND DRAINAGE ORTHOPEDIC  2022    Procedure: INCISION AND DRAINAGE, WOUND, BY ORTHOPEDICS;  Surgeon: Erasmo Stewart M.D.;  Location: [facility];  Service: Orthopedics    IN CYSTOSCOPY,INSERT URETERAL STENT Left 2022    Procedure: CYSTOSCOPY, WITH URETERAL STENT INSERTION;  Surgeon: Osvaldo Baltazar M.D.;  Location: [facility];  Service: Urology    IN CYSTO/URETERO/PYELOSCOPY, DX Left 2022    Procedure: URETEROSCOPY;  Surgeon: Osvaldo Baltazar M.D.;  Location: [facility];  Service: Urology    LASERTRIPSY N/A 2022    Procedure: LITHOTRIPSY, USING LASER;  Surgeon: Osvaldo Baltazar M.D.;  Location: [facility];  Service: Urology    IN CYSTOSCOPY,INSERT URETERAL STENT Left 2021     "Procedure: CYSTOSCOPY, WITH URETERAL STENT INSERTION;  Surgeon: Aly Bowen M.D.;  Location: [facility];  Service: Urology    [place] CYSTO/URETERO/PYELOSCOPY, DX Left 12/16/2021    Procedure: URETEROSCOPY;  Surgeon: Aly Bowen M.D.;  Location: [facility];  Service: Urology    LASERTRIPSY Left 12/16/2021    Procedure: LITHOTRIPSY, USING LASER;  Surgeon: Aly Bowen M.D.;  Location: [facility];  Service: Urology    IRRIGATION & DEBRIDEMENT GENERAL  12/20/2020    Procedure: IRRIGATION AND DEBRIDEMENT, WOUND SACRAL ULCER;  Surgeon: Matt Cummins M.D.;  Location: [facility];  Service: Plastics    ULCER DEBRIDEMENT N/A 8/21/2019    Procedure: debridement of Sacral grade 4 ulcer - W/BONE BIOPSY, 3 liter wash out. bilateral sliding gluteal myocutaneous flap advancement;  Surgeon: Amadeo Moon M.D.;  Location: [facility];  Service: Plastics    FLAP CLOSURE  8/21/2019    Procedure: CLOSURE, FLAP - MUSCLE;  Surgeon: Amadeo Moon M.D.;  Location: [facility];  Service: Plastics    COLOSTOMY N/A 7/27/2019    Procedure: CREATION, COLOSTOMY -  placement;  Surgeon: Elías Hannah M.D.;  Location: [facility];  Service: General    COLOSTOMY      COLOSTOMY TAKEDOWN      HERNIA REPAIR      PERCUTANEOUOSPINNING LOWER EXTREMITY         CURRENT MEDICATIONS  Home Medications    **Home medications have not yet been reviewed for this encounter**          ALLERGIES  Allergies   Allergen Reactions    Sulfa Drugs Rash     Rash, developed this back in 2015 after being placed on \"sulfa antibiotic for my wound\". Antibiotic was stopped and rash went away. Patient states he had a sulfa antibiotic prior to that time back when he was younger w/o a reaction.          PHYSICAL EXAM  VITAL SIGNS: BP (!) 182/93   Pulse 73   Temp 36.1 °C (97 °F) (Temporal)   Resp 18   Ht 1.778 m (5' 10\")   Wt 102 kg (224 lb 13.9 oz)   SpO2 94%   BMI " 32.27 kg/m²   Constitutional: Well developed, Well nourished, No acute distress, Non-toxic appearance.   HENT: Normocephalic, Atraumatic  Eyes: Anicteric sclera.  Pupils equal round reactive  Musculoskeletal: Neck has normal range of motion, No tenderness, Supple.   Cardiovascular: Extremities are warm to touch  Thorax & Lungs: No respiratory distress no stridor  GI: Nondistended    Skin: Discharge noted at the suprapubic catheter site  : Prepubic catheter in place.  There appears to be some yellow urine with some sediment.    RADIOLOGY/PROCEDURES  Suprapubic catheter exchange  The patient had the catheter removed.  Under sterile conditions.  Significant urine started to come out.  And a 24-gauge Swazi Cazares catheter was placed.  Significant urine was then draining patient felt relief.  Normal sediment was noted.      COURSE & MEDICAL DECISION MAKING  Pertinent Labs & Imaging studies reviewed. (See chart for details)  Is a very pleasant gentleman who comes in with a blocks to pubic catheter.  It seems like he flushes it every day and seems to have missed a day which resulted in this.  He tells me that his blood pressure is elevated but once he gets a catheter start training his blood pressure goes down.    I reviewed the chart in September 2020.  He like the ERP change 24 Swazi Cazares catheter.    We will go do the same here.  And discharge the patient.  He has blood pressure medication.  I do not feel concerned at this point that he has elevated blood pressure that needs to be acutely addressed.  There are no symptoms suggest hypertensive emergency    1:35 PM  Catheter replaced with significant relief.  It is now draining well.    This is a well ficantly appearing patient no fever no tachycardia with chronic suprapubic catheter missed flushing with drainage we talked about talking to his urologist about bigger sizes.    FINAL IMPRESSION  1.  Suprapubic cath exchange  2.   3.      Electronically signed by: Dario  CAROLIN Christianson M.D., 12/10/2022 1:16 PM     never used

## 2024-11-20 NOTE — PROGRESS NOTES
"DERMATOLOGY NOTE  NEW VISIT       Chief complaint: Skin Lesion     HPI/location: nose   Time present:  < 1 yr   Painful lesion: No  Itching lesion: No  Enlarging lesion: No  Anything make it better or worse?    History of skin cancer: No  History of precancers/actinic keratoses: Yes, Details: poss AKs on scalp   History of biopsies:No  History of blistering/severe sunburns:Yes, Details: as a child/teen   Family history of skin cancer:No  Family history of atypical moles:No    Allergies   Allergen Reactions    Sulfa Drugs Rash     Rash, developed this back in 2015 after being placed on \"sulfa antibiotic for my wound\". Antibiotic was stopped and rash went away. Patient states he had a sulfa antibiotic prior to that time back when he was younger w/o a reaction.           MEDICATIONS:  Medications relevant to specialty reviewed.     REVIEW OF SYSTEMS:   Positive for skin (see HPI)  Negative for fevers and chills       EXAM:  There were no vitals taken for this visit.  Constitutional: Well-developed, well-nourished, and in no distress.     A focused skin exam was performed including the affected areas of the upper body--scalp, face, neck, trunk and BUEs. Notable findings on exam today listed below and/or in assessment/plan.     -sun exposed skin of trunk and b/l upper extremities and face with scattered clinically benign light brown reticulated macules all of which were morphologically similar and none of which were suspicious for skin cancer today on exam  Several scattered 1-3mm bright red macules and thin papules on the trunk and extremities  Several light brown medium brown skin-colored stuck-on waxy papules scattered on the trunk and upper extremities  Multiple light brown medium brown skin-colored macules papules scattered over the trunk and upper extremities--All with benign-appearing pigment network patterns on dermoscopy  No evidence of lesion on nose in setting of HPI  Ill-defined erythematous gritty/scaly " "papule over the cheek/L infraorbital region      IMPRESSION / PLAN:    1. Actinic keratosis  - NMSC/\"pre-cancer\" education/counseling   CRYOTHERAPY:  Risks (including, but not limited to: skin discoloration, redness, blister, blood blister, recurrence, need for further treatment, infection, scar) and benefits of cryotherapy discussed. Patient verbally agreed to proceed with treatment. 1 cryotherapy freeze thaw cycles of 10 seconds were applied to 1 lesion on face as noted on exam with cryac. Patient tolerated procedure well. Aftercare instructions given--no specific care needed unless irritated during healing process, can apply Vaseline with small band-aid if needed.      2. Lentigines  - Benign-appearing nature of lesions discussed during exam.   - Advised to continue to monitor for any return to clinic for new or concerning changes.      3. Cherry angioma  - Benign-appearing nature of lesions discussed during exam.   - Advised to continue to monitor for any return to clinic for new or concerning changes.      4. SK (seborrheic keratosis)  - Benign-appearing nature of lesions discussed during exam.   - Advised to continue to monitor for any return to clinic for new or concerning changes.      5. Multiple nevi  - Benign-appearing nature of lesions discussed during exam.   - Advised to continue to monitor for any return to clinic for new or concerning changes.        Discussed risks associated with LN2, Patient verbalized understanding and agrees with plan regarding the above        Please note that this dictation was created using voice recognition software. I have made every reasonable attempt to correct obvious errors, but I expect that there are errors of grammar and possibly content that I did not discover before finalizing the note.      Return to clinic in: Return for PRN. and as needed for any new or changing skin lesions.        "

## 2024-11-21 NOTE — PATIENT INSTRUCTIONS
-You have a wound vac at 125 mmHg continuous pressure with black foam. The dressing will be changed every Monday, Wednesday, and Friday at the wound clinic or by your home health nurse.    -Your wound vac battery lasts approximately six hours. Charge your wound vac machine overnight and when you are at home.    -If your wound vac has a leak, you can seal the leak with additional wound vac drape/tape.    -If you are having issues with your wound vac, please consider patching leaks, changing the canister, or calling [phone] for troubleshooting. If the wound vac has been off or non-operational for over 2 hours, call wound care center to inform them and remove all dressings including black foam and replace with gauze moistened with normal saline.     -Should you experience any significant changes in your wounds, such as signs of infection (increasing redness, swelling, localized heat, increased pain, fever > 101 F, chills) or have any questions regarding your home care instructions, please contact the wound center at [phone]. If after hours, contact your primary care physician or go to the hospital emergency room.

## 2024-11-21 NOTE — PROGRESS NOTES
RN Wound Care Procedures 11/20/2024:  Wound vac removed by this RN. Left and right ischium wounds visually inspected and probed with cotton tipped applicators. No retained foam detected in wounds.     Wound vac reapplied to the left and right ischium wounds using four pieces of black simplace granufoam (one piece into the left ischium wound with tail left out, one piece into the right ischium wound with tail left out, one strip with attached button bridged from the left ischium to the right flank (above the right hip), and one strip bridged from the right ischium to the right flank (above the right hip). Negative pressure wound therapy resumed at 125 mmHg continuous pressure, no leaks detected.     Sacral Allevyn dressings placed over the locations of the wounds after wound vac application. Convafoam silicone adhesive foam fenestrated and placed around tac pad after wound vac application.    Updated wound care order routed to [facility] via [email or web address].

## 2024-11-27 ENCOUNTER — OFFICE VISIT (OUTPATIENT)
Dept: WOUND CARE | Facility: MEDICAL CENTER | Age: 74
End: 2024-11-27
Payer: MEDICARE

## 2024-11-27 VITALS
HEART RATE: 58 BPM | DIASTOLIC BLOOD PRESSURE: 80 MMHG | RESPIRATION RATE: 18 BRPM | TEMPERATURE: 97.1 F | OXYGEN SATURATION: 95 % | SYSTOLIC BLOOD PRESSURE: 140 MMHG

## 2024-11-27 DIAGNOSIS — G82.54 QUADRIPLEGIA, C5-C7, INCOMPLETE (HCC): ICD-10-CM

## 2024-11-27 DIAGNOSIS — L89.314 PRESSURE INJURY OF RIGHT ISCHIUM, STAGE 4 (HCC): ICD-10-CM

## 2024-11-27 DIAGNOSIS — L89.324 PRESSURE INJURY OF LEFT ISCHIUM, STAGE 4 (HCC): ICD-10-CM

## 2024-11-27 DIAGNOSIS — M86.9 OSTEOMYELITIS OF LEFT LOWER EXTREMITY (HCC): ICD-10-CM

## 2024-11-27 DIAGNOSIS — L89.893 PRESSURE INJURY OF LEFT LEG, STAGE 3 (HCC): ICD-10-CM

## 2024-11-27 DIAGNOSIS — L89.894 PRESSURE INJURY OF LEFT FOOT, STAGE 4 (HCC): ICD-10-CM

## 2024-11-27 DIAGNOSIS — L89.154 SACRAL DECUBITUS ULCER, STAGE IV (HCC): ICD-10-CM

## 2024-11-27 DIAGNOSIS — L89.623 PRESSURE INJURY OF LEFT HEEL, STAGE 3 (HCC): ICD-10-CM

## 2024-11-27 DIAGNOSIS — M86.18 OTHER ACUTE OSTEOMYELITIS, OTHER SITE (HCC): ICD-10-CM

## 2024-11-27 DIAGNOSIS — T81.31XD POSTOPERATIVE WOUND DEHISCENCE, SUBSEQUENT ENCOUNTER: ICD-10-CM

## 2024-11-27 PROCEDURE — 11043 DBRDMT MUSC&/FSCA 1ST 20/<: CPT

## 2024-11-27 PROCEDURE — 97605 NEG PRS WND THER DME<=50SQCM: CPT

## 2024-11-28 NOTE — PATIENT INSTRUCTIONS
- Keep dressings clean and dry. Change dressings every 3-4 days, and if they become over saturated, soiled or fall off.     - Wound vac may not have any drainage in tube or cannister & it will still be working.   Change cannister if it does become full by pressing tab on side of machine to remove canister and snap on new one. Full canister can be thrown in the trash. If cannister fills with bright red blood - go to ER. Dressing will be changed every MWF at the wound clini.  If you are having issues with your wound VAC, please consider patching leaks, changing the canister, or calling [phone] for troubleshooting. If the wound VAC has been off or un-operational for over 2 hours, call wound care center to inform them and remove all dressings including black foam and replace with normal saline damp gauze.     - Should you experience any significant changes in your wound(s), such as infection (redness, swelling, localized heat, increased pain, fever > 101 F, chills) or have any questions regarding your home care instructions, please contact the wound center at [phone]. If after hours, contact your primary care physician or go to the hospital emergency room.     - If you are admitted to any hospital, you will need a new referral to come back to the wound clinic and any scheduled appointments that you already have, may be cancelled.    - If you are 5 or more minutes late for an appointment, we reserve the right to cancel and reschedule that appointment. Additionally, if you are habitually late or not showing (3 late cancellations and/or no shows), we reserve the right to cancel your remaining appointments and it will be your responsibility to obtain a new referral if services are still needed.

## 2024-11-28 NOTE — PROGRESS NOTES
Provider Encounter- Pressure Injury        HISTORY OF PRESENT ILLNESS  Wound History:    START OF CARE IN CLINIC: 1/31/2024 (return to clinic after hospitalization)    REFERRING PROVIDER: Racquel York       WOUNDS-superior coccyx pressure injury, stage IV-resolved                                 Coccyx pressure injury, stage IV-resolved           Inferior coccyx pressure injury, stage IV resolved                                 Right ischial pressure injury, stage IV                                 Left heel pressure injury, recurring stage III-resolved                                 Left posterior lower leg/calf-stage III-resolved                                 Left medial lower leg surgical wound dehiscence-resolved, reopens frequently-resolved            Left ischial pressure injury, stage IV-first observed in clinic 5/1/2024          HISTORY: Patient with history of incomplete quadriplegia referred to [facility] for treatment of a stage IV pressure injury.  He has a history of previous pressure injuries to this area, and underwent muscle flaps in 2019, and then again in 2020.  He was seen in the wound clinic in November 2021 for an ulcer proximal from his current ulcer, and pressure injuries to his left posterior lower leg and left heel.  At that time, it was discovered that the patient had retained VAC foam embedded in the wound bed of the sacral wound.  Attempts were made to get him back to his plastic surgeon, though unsuccessful.  In January he underwent surgical removal of VAC sponge along with excisional debridement of his sacral wound by Dr. Chaves.  After the surgery, his wound went on to heal without incident.   In early April 2022, his home health nurse noted a new sacral ulcer, below the previous ulcer which quickly tripled in size over the following weeks.  The ulcer to his left medial lower leg had also deteriorated, with bone visible at the base..  He was hospitalized from 4/22 until 4/27/2022 and  underwent surgery with Dr. Raman on 4/26 for irrigation and debridement of multiple compartments of the left lower extremity, bone excision, and complex closure of chronic wound using biologic skin substitute.   His sacrococcygeal wound was not surgically addressed during this admission.  He was discharged back to his group home, with home health, and referral to outpatient wound clinic for his sacral wound.  He was instructed to follow-up with his surgeon for his lower leg wound.       Postoperatively, the left medial lower leg incision dehisced.  He was seen by his surgeon at [facility] on 5/11.  The surgeon opted to leave remaining sutures in place, and refer him to the wound clinic for treatment of this wound.   Treatment of this wound was initiated in clinic on 5/12.  During this visit was also noted that his heel DTI had resolved, but that he had a new pressure injury to his left posterior lower extremity.     A new pressure injury was noted to patient's right upper buttock/lower back on 5/20/2022.  Wound was linear in shape, skin discolored but intact.     Abrasion noted to left anterior lower leg.  First observed in clinic on 7/22/2022.  Patient states he bumped his leg into his food tray.     Small DTI noted to patient's left lateral lower leg on 7/29/2022.  Skin intact but discolored.     Large area of deep tissue injury noted to patient's left exterior lower leg.  Patient denied any trauma to this area.  Skin intact.  Wound documented.    1/27/2023: Patient was admitted to [facility] from 1/23-1/25/2023 with gross hematuria. He underwent RICHARD which showed watchman device was in place and he was taken off of Xarelto. While hospitalized wound team was consulted. He was referred back to [facility] and home health upon discharge.    Patient was hospitalized at [facility] for pyelonephritis from 2/26 until 3/2/2023, admitted for fever and general malaise.  He was admitted and initially started on linezolid and meropenem for suspected  UTI and history of multidrug-resistant organisms.  Urine cultures were negative. ID was consulted, recommended CT of chest and abdomen,which were negative for acute findings. However, he was treated with 5 days total course of antibiotics for suspected UTI, and symptoms completely resolved.  During this admission, the inpatient wound team was consulted for treatment of his sacral and lower leg wounds.  A wound culture was taken from his left heel pressure ulcer, negative.  Once stabilized, he was discharged home and referred back to [facility] to resume treatment of his wounds.    Patient was hospitalized at [facility] from 12/11 until 12/23/2023, admitted for fever.  Wound infection suspected.  CT scan of abdomen and pelvis for evaluation of sacral pressure injury showed gas tracking down to the bone consistent with osteomyelitis.  He underwent I&D of right ischial ulcer (documented as buttock) with Dr. Bansal, medial tract leading to an abscess was identified.  Cavity was opened allowing it to drain into the main wound bed.  Wound VAC was placed and managed by wound team during this admission.  A bone scan of patient's left foot was also done, initially concerning for osteomyelitis.  Orthopedic surgery was consulted and did not recommend surgical intervention. ID consulted also, recommended the patient to receive IV ertapenem 1 g every 24 hours plus IV daptomycin 8 mg/kg every 24 hours through 1/22/2024.   He was discharged to [facility] on 1/23 for IV antibiotics and wound care.  From the LTAC he was discharged home on 1/22 with home health and referral back to [facility] to resume management of his wounds  .      Pertinent Medical History: Incomplete quadriplegia, history of stage IV pressure injuries, history of flap procedures to sacral pressure injuries, osteomyelitis, obesity, colostomy in place   Contributing factors: Immobility and Obesity, impaired sensation    Personal support: Attendant-staff at senior living and home health  nursing    TOBACCO USE:   Former smoker, quit in 1977.  Never used smokeless tobacco    Patient's problem list, allergies, and current medications reviewed and updated in Epic    Interval History:  Interval History thinned 7/29/2022.  Please see previous notes for complete interval history.   Interval History thinned 1/27/2023. Please see previous note for complete interval history.  Interval History thinned 3/3/2023.  Please see previous notes for complete interval history.    Interval History thinned 8/4/2023.  Please see previous notes for complete interval history.    Interval History thinned 1/31/2024.  Please see previous notes for complete interval history.    Interval History thinned 6/26/2024.  Please see previous notes for complete interval history.      6/19/2024: Clinic visit with Steve Hodges MD. Patient denies any fevers or chills. Reports he is tolerating Abx. He has appointment with ID later today to discuss OM treatment. He is tolerating wound VAC. Slight bone exposed bilaterally in ischial wounds, slightly worse.    6/26/2024 : Clinic visit with ADILSON Arthur, FNPEGGY-BC, CWDINESHN, CFTRACI.   Patient presents today with significant deterioration of his both ischial wounds, with increased depth of undermining.   He now has a PICC line, and will be starting outpatient infusions of ertapenem later today.  He states he is feeling well, denies fevers, chills, nausea, vomiting, cough or shortness of breath.  Due to deterioration of his wound, we did discuss possibly having him go over the hospital for surgical debridement and IV antibiotics.  He was hesitant to do so.  We agreed to see how he looks after a week or so IV antibiotics.  He is agreeable to minimizing time up in his wheelchair.  He also agrees to go to the emergency room immediately if he develops any signs or symptoms of infection.    7/10/2024: Clinic visit with Steve Hodges MD. Patient reports feeling in normal state of health. He missed  last appointment as he had fall out of wheelchair and was evaluated in ED. He denies any injuries from fall. Patient wounds are measuring slightly smaller. He continues on Ertapenem. Patient reports that he has appointment for seating evaluation from [facility] scheduled, but does not recall the date.    7/17/2024 : Clinic visit with ADILSON Arthur, HOLDEN, LILIYA VALERIO.   Anjum states he is feeling well.  Left ischial wound measures significantly smaller today, however measurements vary somewhat depending on pt's position.  Both wounds appear to be progressing slightly, with improving tissue quality.    7/24/2024 : Clinic visit with ADILSON Arthur, HOLDEN, IMAN, LILIYA.   Patient continues to feel well, offers no complaints.  Right ischial wound measures smaller, left ischial wound is larger.  Patient tolerating VAC without any difficulty.  Home health continues to see him in between clinic visits.  He is still receiving daily infusions of IV antibiotics, will be completing these in August.    7/31/2024 : Clinic visit with ADILSON Arthur, HOLDEN, IMAN, LILIYA.   Anjum continues to feel well, his wounds are slowly progressing.  Tolerating VAC.  He is on IV antibiotics for 1 more week.  Admits that he is up in his wheelchair for 10 to 14 hours/day.    8/7/2024 : Clinic visit with ADILSON Arthur FNP-BC, LILIYA VALERIO.   Anjum states he is feeling well today, offers no complaints.  Both wounds measure a bit smaller today, new granulation tissue noted.  He will be getting his last IV antibiotic infusion today.    8/14/2024 : Clinic visit with ADILSON Arthur FNP-BC, IMAN, LILIYA.   Patient continues to feel well.  He has completed his antibiotics, followed up with ID earlier this week, no further antibiotic therapy at this time.  Ischial wounds are slowly progressing.  Lower extremity wounds remain resolved.    8/21/2024 : Clinic visit with ADILSON Arthur FNP-BC, LILIYA VALERIO.   Anjum states he is feeling  well, offers no complaints.  His wounds are slowly progressing, increased granulation.    8/28/2024 : Clinic visit with ADILSON Arthur, HOLDEN, LILIYA VALERIO.   Turk states he is feeling well overall.  His wounds measure slightly smaller.  He has had some urinary symptoms, reports leakage from around his suprapubic catheter last night, and excessively full urine bag.  He has been in contact with his urologist office.  He also mentions that he has a recurring small scab to his nose, states that it falls off only to return shortly after.  This has been going on for several months.  I offered to refer him to dermatology.    9/11/2024 : Clinic visit with ADILSON Arthur, HOLDNE, LILIYA VALERIO.   Turk states he is feeling well.  He missed his appointment last week due to car troubles.  His vehicle is now running well.  Ischial wounds show some improvement, increased granulation tissue.  He does have a small reopening of left posterior lower leg wound, appears to be a small abrasion over area of scar tissue.    9/18/2024: Clinic visit with Steve Hodges MD. Patient reports doing ok. Denies any acute issues with wound VAC. Reports that he was fitted by [employer] for new seat cushion and is being manufactured, he is not sure when will be ready. Patient's wounds appears slightly improved. Left posterior leg wound remains open.    9/25/2024 : Clinic visit with ADILSON Arthur, HOLDEN, IMAN, LILIYA.   Patient states he is feeling well.  His wounds measure about the same.    10/2/2024: Clinic visit with HOLDEN Johnson CWON, LILIYA.  Pt denies fevers, chills, nausea, vomiting. Bilateral ischial ulcers increased in area.  Left IT quality overall worse compared to right IT.  Recommend Dakin's soak.  Continue with VAC to bilateral IT.    10/9/2024 : Clinic visit with ADILSON Arthur, HOLDEN, IMAN, LILIYA.   Patient continues to feel well overall.  His wounds measure about the same, no evidence of infection.   He does have some minor breakdown over the scar tissue of his sacrum which bears watching.  He has not heard from [employer] about his seat cushion, states he will contact them again.    10/16/2024 : Clinic visit with ADILSON Arthur, HOLDEN, IMAN, LILIYA.   Anjum continues to feel well.  His wounds measure slightly smaller.  Sacral wound just slightly open.  He called [employer] earlier this week, was told his new cushion is ready, and that they would deliver it next Monday.  He also states he is due for a new wheelchair, but has not yet become process.      10/23/2024 : Clinic visit with ADILSON Arthur, HOLDEN, IMAN, LILIYA.   Anjum's wounds present today with significantly more odor.  He states he is feeling fine, denies fevers, chills, nausea, vomiting, cough or shortness of breath.  Increased drainage noted.  Wounds measure about the same.  Culture collected in clinic today after debridement.  VAC placed on hold.  Wounds packed with Puracyn moistened silver Hydrofiber.   Patient reminded that he is to go to the emergency room if he notices any increased redness, swelling, drainage or odor, or if he develops fever, chills, nausea or vomiting.    He has a new cushion to his wheelchair.  States that he does not yet have a new wheelchair, will be picking on out the next few weeks.    10/30/2024 : Clinic visit with ADILSON Arthur, HOLDEN, IMAN, LILIYA.   Anjum states he is feeling well.  Wound odor still noticeable.  Culture collected last visit was positive for light growth of Proteus.  Given continued odor, will go ahead and prescribe short course of Augmentin, no other p.o. options available based on sensitivities.  Continue with Dakin's wound cleansing.  Home health to restart VAC on Friday 11/6/2024: Clinic visit with Steve Hodges MD. Patient reports doing well, denies any acute issues. Tolerating augmentin well without side effects. Odor much improved today. Wounds are improving slowly. Continue wound  VAC.    11/14/2024: Clinic visit with Steve Hodges MD. Patient reports doing ok. He was frustrated coming into clinic, was having trouble exiting his van. No evidence of wound infection today    11/20/2024: Clinic visit with Steve Hodges MD. Patient reports feeling in normal state of health. His ischial wounds stable and slowly improving. He has reopened sacral wound however. Denies any signs or symptoms of infection.    11/27/2024: Clinic visit with Steve Hodges MD. Patient reports doing well, denies any acute issues. Sacral wound measuring smaller. Right ischial wound smaller. Left ischial wound larger with increased slough and necrotic tissue at base of wound. Patient has contacted [employer] technician to evaluate seat due to reopening of sacrum, he is pending call back.    REVIEW OF SYSTEMS:   Unchanged from previous wound clinic assessment on 11/20/2024, except as noted in interval history above.      PHYSICAL EXAMINATION:   BP (!) 140/80   Pulse (!) 58   Temp 36.2 °C (97.1 °F) (Temporal)   Resp 18   SpO2 95%   Physical Exam  Constitutional:       Appearance: He is obese.   Cardiovascular:      Rate and Rhythm: Normal rate.   Pulmonary:      Effort: Pulmonary effort is normal.   Abdominal:      Comments: Colostomy left lower quadrant   Genitourinary:     Comments: Suprapubic catheter to down drain   Skin:     Comments: Stage IV pressure injury to right ischium: Wound smaller. Tissue quality improved, increased granulation.  Moderate serous drainage, slight odor.  No periwound erythema or induration    Stage IV pressure injury of left ischium: Wound measuring larger, Slough to wound bed and increased necrotic tissue today.  Moderate serosanguineous drainage.  Improved odor.  No periwound erythema or induration    Stage IV pressure injury sacrum: Measuring smaller.    All other wounds remain healed, high risk for recurrence     Neurological:      Mental Status: He is alert and oriented to person,  place, and time.   Psychiatric:         Mood and Affect: Mood normal.         WOUND ASSESSMENT  Wound 12/12/23 Pressure Injury Ischium Right (Active)   Wound Image    11/27/24 1500   Site Assessment Pink;Red;Undermining 11/27/24 1500   Periwound Assessment Scar tissue 11/27/24 1500   Margins Unattached edges 11/27/24 1500   Closure Secondary intention 10/16/24 1700   Drainage Amount Moderate 11/27/24 1500   Drainage Description Serosanguineous 11/27/24 1500   Treatments Cleansed;Provider debridement;Site care 11/27/24 1500   Offloading/DME Other (comment) 11/27/24 1500   Wound Cleansing Hypochlorus Acid 11/27/24 1500   Periwound Protectant No-sting Skin Prep;Drape;Hydrocolloid 11/27/24 1500   Dressing Status Intact;Old drainage 10/02/24 1500   Dressing Changed Changed 11/27/24 1500   Dressing Cleansing/Solutions Not Applicable 11/27/24 1500   Dressing Options Wound Vac;Offloading Dressing - Sacral 11/27/24 1500   Dressing Change/Treatment Frequency Monday, Wednesday, Friday, and As Needed 11/20/24 1605   Wound Team Following Weekly 10/16/24 1700   WOUND NURSE ONLY - Pressure Injury Stage 4 11/27/24 1500   Wound Length (cm) 3.4 cm 11/27/24 1500   Wound Width (cm) 1.4 cm 11/27/24 1500   Wound Depth (cm) 1.1 cm 11/27/24 1500   Wound Surface Area (cm^2) 4.76 cm^2 11/27/24 1500   Wound Volume (cm^3) 5.236 cm^3 11/27/24 1500   Post-Procedure Length (cm) 3.5 cm 11/27/24 1500   Post-Procedure Width (cm) 1.5 cm 11/27/24 1500   Post-Procedure Depth (cm) 1.1 cm 11/27/24 1500   Post-Procedure Surface Area (cm^2) 5.25 cm^2 11/27/24 1500   Post-Procedure Volume (cm^3) 5.775 cm^3 11/27/24 1500   Wound Healing % 91 11/27/24 1500   Tunneling (cm) 0 cm 11/27/24 1500   Tunneling Clock Position of Wound 2 07/10/24 1615   Undermining (cm) 0.6 cm 11/27/24 1500   Undermining of Wound, 1st Location From 4 o'clock;To 7 o'clock 11/27/24 1500   Undermining (cm) - 2nd location 1.3 cm 11/27/24 1500   Undermining of Wound, 2nd Location From 12  o'clock;To 3 o'clock 11/27/24 1500   Wound Odor None 11/20/24 1605   Exposed Structures None 11/20/24 1605   Number of days: 351       Wound 05/01/24 Pressure Injury Ischium Left (Active)   Wound Image    11/27/24 1500   Site Assessment Red;Pink;Yellow;Undermining 11/27/24 1500   Periwound Assessment Scar tissue 11/27/24 1500   Margins Unattached edges 11/27/24 1500   Closure Secondary intention 09/18/24 1500   Drainage Amount Large 11/27/24 1500   Drainage Description Serosanguineous 11/27/24 1500   Treatments Cleansed;Provider debridement;Site care 11/27/24 1500   Offloading/DME Other (comment) 11/27/24 1500   Wound Cleansing Hypochlorus Acid 11/27/24 1500   Periwound Protectant No-sting Skin Prep;Drape;Hydrocolloid 11/27/24 1500   Dressing Changed Changed 11/27/24 1500   Dressing Cleansing/Solutions Not Applicable 11/27/24 1500   Dressing Options Wound Vac;Offloading Dressing - Sacral 11/27/24 1500   Dressing Change/Treatment Frequency Monday, Wednesday, Friday, and As Needed 11/27/24 1500   Wound Team Following Weekly 10/16/24 1700   WOUND NURSE ONLY - Pressure Injury Stage 4 11/27/24 1500   Non-staged Wound Description Not applicable 09/18/24 1600   Wound Length (cm) 4.8 cm 11/27/24 1500   Wound Width (cm) 2 cm 11/27/24 1500   Wound Depth (cm) 2.3 cm 11/27/24 1500   Wound Surface Area (cm^2) 9.6 cm^2 11/27/24 1500   Wound Volume (cm^3) 22.08 cm^3 11/27/24 1500   Post-Procedure Length (cm) 4.9 cm 11/27/24 1500   Post-Procedure Width (cm) 2.1 cm 11/27/24 1500   Post-Procedure Depth (cm) 2.4 cm 11/27/24 1500   Post-Procedure Surface Area (cm^2) 10.29 cm^2 11/27/24 1500   Post-Procedure Volume (cm^3) 24.696 cm^3 11/27/24 1500   Wound Healing % -9936 11/27/24 1500   Tunneling (cm) 0 cm 11/27/24 1500   Undermining (cm) 1.8 cm 11/27/24 1500   Undermining of Wound, 1st Location From 11 o'clock;To 3 o'clock 11/27/24 1500   Wound Odor None 11/27/24 1500   Exposed Structures None 11/27/24 1500   Number of days: 210        Wound 11/20/24 Pressure Injury Sacrum --Sacrum (Active)   Wound Image    11/27/24 1500   Site Assessment Red;Yellow 11/27/24 1500   Periwound Assessment Fragile 11/27/24 1500   Margins Unattached edges 11/27/24 1500   Drainage Amount Small 11/27/24 1500   Drainage Description Serosanguineous 11/27/24 1500   Treatments Cleansed;Provider debridement;Site care 11/27/24 1500   Wound Cleansing Hypochlorus Acid 11/27/24 1500   Periwound Protectant No-sting Skin Prep 11/27/24 1500   Dressing Changed Changed 11/27/24 1500   Dressing Cleansing/Solutions Not Applicable 11/27/24 1500   Dressing Options Hydrofiber Silver Strip;Hydrofiber Silver;Offloading Dressing - Sacral 11/27/24 1500   Dressing Change/Treatment Frequency Monday, Wednesday, Friday, and As Needed 11/27/24 1500   WOUND NURSE ONLY - Pressure Injury Stage 4 11/27/24 1500   Wound Length (cm) 0.7 cm 11/27/24 1500   Wound Width (cm) 0.4 cm 11/27/24 1500   Wound Depth (cm) 0.2 cm 11/27/24 1500   Wound Surface Area (cm^2) 0.28 cm^2 11/27/24 1500   Wound Volume (cm^3) 0.056 cm^3 11/27/24 1500   Post-Procedure Length (cm) 0.9 cm 11/27/24 1500   Post-Procedure Width (cm) 0.4 cm 11/27/24 1500   Post-Procedure Depth (cm) 0.2 cm 11/27/24 1500   Post-Procedure Surface Area (cm^2) 0.36 cm^2 11/27/24 1500   Post-Procedure Volume (cm^3) 0.072 cm^3 11/27/24 1500   Wound Healing % 25 11/27/24 1500   Tunneling (cm) 0 cm 11/27/24 1500   Undermining (cm) 0 cm 11/27/24 1500   Wound Odor None 11/27/24 1500   Exposed Structures None 11/27/24 1500   Number of days: 7       PROCEDURE: Excisional debridement of right and left ischial wounds, sacral wound  -2% viscous lidocaine applied topically to wound bed for approximately 5 minutes prior to debridement  -Curette used to debride wound bed.  Excisional debridement was performed to remove devitalized tissue until healthy, bleeding tissue was visualized.  Total area debrided was approximately 15.9 cm², including into undermined areas of  "wounds.  Tissue debrided into the muscle / fascia layer.    -Bleeding controlled with manual pressure.    -Wound care completed by wound RN, refer to flowsheet  -Patient tolerated the procedure well, without c/o pain or discomfort.    -No excisional debridement of sacral wound required    Pertinent Labs and Diagnostics:    Labs:     A1c: No results found for: \"HBA1C\"     [facility] results, 7/1/2022 (under media tab)    CRP 13    ESR 31      IMAGING:     X-ray left tib-fib ordered 2/16/2024 through [facility]    12/11/2023-CT of abdomen pelvis with contrast  IMPRESSION:   1.  Right ischial decubitus ulcer extending to bone with soft tissue gas tracking along the right perineum. Appearance suggesting osteomyelitis, consider component of necrotizing fasciitis as clinically appropriate.  2.  Small pericardial effusion  3.  Left adrenal nodule, density on prior noncontrast CT demonstrates adenoma.  4.  Hepatomegaly  5.  Enlarged prostate, workup and evaluation for causes of prostate enlargement recommended as clinically appropriate.  6.  Atherosclerosis and atherosclerotic coronary artery disease    12/15/2023-bone scan of left foot  IMPRESSION:     1.  Mild increased activity in the LEFT 1st and 3rd toes on blood pool and delayed images possibly indicating inflammation/infection.  2.  No significant blood flow asymmetry.          VASCULAR STUDIES: No results found.    LAST  WOUND CULTURE:   Lab Results   Component Value Date/Time    CULTRSULT Heavy growth usual skin ade. (A) 10/23/2024 03:36 PM    CULTRSULT (A) 10/23/2024 03:36 PM     Proteus mirabilis ESBL  Light growth  Extended Spectrum Beta-lactamase (ESBL) isolated.  ESBL's may be clinically resistant to therapy with  Penicillins,Cephalosporins or Aztreonam despite  apparent in vitro susceptibility to some of these agents.  The patient requires contact isolation.  Please contact pharmacy or an Infectious Disease Specialist  if you have any questions about " appropriate therapy.        PATHOLOGY  2/17/2023-bone fragment extracted from left lower extremity wound\\  FINAL DIAGNOSIS:     A. Left leg bone fragment at base of chronic wound:          Extensively degenerated fibrocartilaginous tissue with a rim of           fibrinopurulent debris          Correlate with culture findings            Comment: While no residual intact bone is identified, these           findings are suggestive of adjacent osteomyelitis.      ASSESSMENT AND PLAN:     1. Sacral decubitus ulcer, stage IV ([facility])  Comments: Ulcer first noted in early April 2022 as small open area which quickly enlarged.  This ulcer is present distal from previous sacral ulcer which healed after surgery in January.  Patient has history of flap reconstruction x2 to this area.    11/27/2024: Wound reoccurred last week, measuring smaller  - Excisional debridement was performed in clinic, medically necessary to promote wound healing.  - Initiate wound care.  -Patient does spend a lot of time up in his wheelchair, and wishes to continue to do so for his quality of life.  He lives independently, drives, and is involved with family activities.    -His presents today with a new cushion in his wheelchair.  Has yet to pick out a new wheelchair  - Known OM that was previously treated. CT scan done during recent hospitalization did not show OM of sacrum, however OM of the ischium noted.  -Patient is very well versed in pressure relief strategies    Wound care: Hydrofiber silver, silicone adhesive foam dressing    2. Pressure injury of right ischium, stage 4 ([facility])  Comments: Abscess and OM found on CT during hospitalization in December 2023.  Patient underwent I&D with VAC placement.  IV antibiotics through 1/22/2024.    11/27/2024: Wound slightly improved with improved tissue quality, continues to have undermining  - Excisional debridement of nonviable tissue from wound in clinic today, medically necessary to promote wound  healing  - Home health  to continue Dakins soaked gauze to wound for approximately 10 minutes with each dressing change  - Continue wound VAC  -Patient to return to clinic weekly for assessment, debridement, and dressing change.    -Home health to change dressing 2 times per week in between clinic visits.    -Patient is very well versed on pressure relief measures, has adequate surface on bed, alternating low air loss.  - Previously noted odor has resolved.    Wound care: NPWT -125mmHg continuous, black foam    3. Pressure injury of left ischium, stage 4 ([facility])    11/27/2024: Wound measuring larger today, increased slough and slight necrotic tissue at base.  - Excisional debridement of wound in clinic today, medically necessary to promote wound healing.  - Patient to return to clinic weekly for assessment, debridement, and dressing change  - Continue VAC  -Home health to change dressing 2 times per week in between clinic visits.    -Patient has new cushion in his wheelchair, see above  -Patient is very well versed on pressure relief measures, has adequate surface on bed, alternating low air loss.    Wound care: NPWT -125mmHg continuous, black foam    4. Other acute osteomyelitis, other site ([facility])    11/27/2024: Bone fragments removed during clinic visit in June were sent for pathology, polymicrobial, most concerning was an MDR ESBL Proteus.   -Patient completed IV antibiotics.  No further antibiotics at this time per ID  -Patient understands he has a very low threshold for infection/sepsis.  He understands he is to go to the emergency room immediately if he begins to experience fevers, chills, nausea or vomiting, or if he notices radiating erythema or purulent drainage from his wounds.    5. Postoperative wound dehiscence, subsequent encounter  6. Osteomyelitis of left lower extremity ([facility])  Comments: On 4/26/2022 patient underwent irrigation and debridement of multiple compartments of the left lower extremity states  for pressure injury, with bone excision, and complex closure of chronic wound using biologic skin substitute.  During postop visit in surgeons office on 5/11, surgical site was noted to be dehisced.      11/27/2024: Skin remains intact  - Wound waxes and wanes due to pressure and underlying known chronic OM  -Patient to be mindful of offloading when supine, should assure that his leg is not rotating medially  -Monitor site each clinic visit  Wound care: Silicone foam dressing, Tubigrip D    7. Pressure injury of left foot, stage 4 ([facility])  8. Pressure injury of left leg, stage 3 ([facility])  9. Pressure injury of left heel, stage 3 ([facility])    11/27/2024: All of these wounds remain healed  -Monitor pressure points each clinic visit  - Patient aware of offloading.    10. Quadriplegia, C5-C7 incomplete ([facility])  Comments: Complicating factor.  Impaired mobility and sensation  -Patient is still spending 7-8 hours/day up in his wheelchair and knows to reposition frequently.  Wears heel float boots bilaterally at all times  - Patient reports that he has seating evaluation with [employer] upcoming.    Please note that this note may have been created using voice recognition software. I have worked with technical experts from [employer] to optimize the interface.  I have made every reasonable attempt to correct obvious errors, but there may be errors of grammar and possibly content that I did not discover before finalizing the note.

## 2024-12-04 ENCOUNTER — OFFICE VISIT (OUTPATIENT)
Dept: WOUND CARE | Facility: MEDICAL CENTER | Age: 74
End: 2024-12-04
Payer: MEDICARE

## 2024-12-04 VITALS
DIASTOLIC BLOOD PRESSURE: 70 MMHG | SYSTOLIC BLOOD PRESSURE: 112 MMHG | TEMPERATURE: 98.1 F | HEART RATE: 82 BPM | OXYGEN SATURATION: 97 % | RESPIRATION RATE: 18 BRPM

## 2024-12-04 DIAGNOSIS — L89.154 SACRAL DECUBITUS ULCER, STAGE IV (HCC): ICD-10-CM

## 2024-12-04 DIAGNOSIS — M86.18 OTHER ACUTE OSTEOMYELITIS, OTHER SITE (HCC): ICD-10-CM

## 2024-12-04 DIAGNOSIS — L89.324 PRESSURE INJURY OF LEFT ISCHIUM, STAGE 4 (HCC): ICD-10-CM

## 2024-12-04 DIAGNOSIS — L89.314 PRESSURE INJURY OF RIGHT ISCHIUM, STAGE 4 (HCC): ICD-10-CM

## 2024-12-04 DIAGNOSIS — M86.9 OSTEOMYELITIS OF LEFT LOWER EXTREMITY (HCC): ICD-10-CM

## 2024-12-04 DIAGNOSIS — T81.31XD POSTOPERATIVE WOUND DEHISCENCE, SUBSEQUENT ENCOUNTER: ICD-10-CM

## 2024-12-04 DIAGNOSIS — L89.623 PRESSURE INJURY OF LEFT HEEL, STAGE 3 (HCC): ICD-10-CM

## 2024-12-04 DIAGNOSIS — G82.54 QUADRIPLEGIA, C5-C7, INCOMPLETE (HCC): ICD-10-CM

## 2024-12-04 DIAGNOSIS — L89.893 PRESSURE INJURY OF LEFT LEG, STAGE 3 (HCC): ICD-10-CM

## 2024-12-04 DIAGNOSIS — L89.894 PRESSURE INJURY OF LEFT FOOT, STAGE 4 (HCC): ICD-10-CM

## 2024-12-04 PROCEDURE — 11043 DBRDMT MUSC&/FSCA 1ST 20/<: CPT

## 2024-12-04 PROCEDURE — 3078F DIAST BP <80 MM HG: CPT | Performed by: STUDENT IN AN ORGANIZED HEALTH CARE EDUCATION/TRAINING PROGRAM

## 2024-12-04 PROCEDURE — 3074F SYST BP LT 130 MM HG: CPT | Performed by: STUDENT IN AN ORGANIZED HEALTH CARE EDUCATION/TRAINING PROGRAM

## 2024-12-04 PROCEDURE — 97605 NEG PRS WND THER DME<=50SQCM: CPT

## 2024-12-04 PROCEDURE — 11046 DBRDMT MUSC&/FSCA EA ADDL: CPT | Performed by: STUDENT IN AN ORGANIZED HEALTH CARE EDUCATION/TRAINING PROGRAM

## 2024-12-04 PROCEDURE — 11043 DBRDMT MUSC&/FSCA 1ST 20/<: CPT | Performed by: STUDENT IN AN ORGANIZED HEALTH CARE EDUCATION/TRAINING PROGRAM

## 2024-12-04 PROCEDURE — 11046 DBRDMT MUSC&/FSCA EA ADDL: CPT

## 2024-12-04 NOTE — PROGRESS NOTES
Provider Encounter- Pressure Injury        HISTORY OF PRESENT ILLNESS  Wound History:    START OF CARE IN CLINIC: 1/31/2024 (return to clinic after hospitalization)    REFERRING PROVIDER: Racquel York       WOUNDS-superior coccyx pressure injury, stage IV-resolved                                 Coccyx pressure injury, stage IV-resolved           Inferior coccyx pressure injury, stage IV resolved                                 Right ischial pressure injury, stage IV                                 Left heel pressure injury, recurring stage III-resolved                                 Left posterior lower leg/calf-stage III-resolved                                 Left medial lower leg surgical wound dehiscence-resolved, reopens frequently-resolved            Left ischial pressure injury, stage IV-first observed in clinic 5/1/2024          HISTORY: Patient with history of incomplete quadriplegia referred to [facility] for treatment of a stage IV pressure injury.  He has a history of previous pressure injuries to this area, and underwent muscle flaps in 2019, and then again in 2020.  He was seen in the wound clinic in November 2021 for an ulcer proximal from his current ulcer, and pressure injuries to his left posterior lower leg and left heel.  At that time, it was discovered that the patient had retained VAC foam embedded in the wound bed of the sacral wound.  Attempts were made to get him back to his plastic surgeon, though unsuccessful.  In January he underwent surgical removal of VAC sponge along with excisional debridement of his sacral wound by Dr. Chaves.  After the surgery, his wound went on to heal without incident.   In early April 2022, his home health nurse noted a new sacral ulcer, below the previous ulcer which quickly tripled in size over the following weeks.  The ulcer to his left medial lower leg had also deteriorated, with bone visible at the base..  He was hospitalized from 4/22 until 4/27/2022 and  underwent surgery with Dr. Raman on 4/26 for irrigation and debridement of multiple compartments of the left lower extremity, bone excision, and complex closure of chronic wound using biologic skin substitute.   His sacrococcygeal wound was not surgically addressed during this admission.  He was discharged back to his group home, with home health, and referral to outpatient wound clinic for his sacral wound.  He was instructed to follow-up with his surgeon for his lower leg wound.       Postoperatively, the left medial lower leg incision dehisced.  He was seen by his surgeon at [facility] on 5/11.  The surgeon opted to leave remaining sutures in place, and refer him to the wound clinic for treatment of this wound.   Treatment of this wound was initiated in clinic on 5/12.  During this visit was also noted that his heel DTI had resolved, but that he had a new pressure injury to his left posterior lower extremity.     A new pressure injury was noted to patient's right upper buttock/lower back on 5/20/2022.  Wound was linear in shape, skin discolored but intact.     Abrasion noted to left anterior lower leg.  First observed in clinic on 7/22/2022.  Patient states he bumped his leg into his food tray.     Small DTI noted to patient's left lateral lower leg on 7/29/2022.  Skin intact but discolored.     Large area of deep tissue injury noted to patient's left exterior lower leg.  Patient denied any trauma to this area.  Skin intact.  Wound documented.    1/27/2023: Patient was [place] to [facility] [place]1/25/2023 with gross hematuria. He underwent RICHARD which showed watchman device was in place and he was taken off of Xarelto. While hospitalized wound team was consulted. He was referred back to [facility] and home health upon discharge.    Patient was hospitalized at [facility] for pyelonephritis from 2/26 until 3/2/2023, admitted for fever and general malaise.  He was admitted and initially started on linezolid and meropenem for suspected  UTI and history of multidrug-resistant organisms.  Urine cultures were negative. ID was consulted, recommended CT of chest and abdomen,which were negative for acute findings. However, he was treated with 5 days total course of antibiotics for suspected UTI, and symptoms completely resolved.  During this admission, the inpatient wound team was consulted for treatment of his sacral and lower leg wounds.  A wound culture was taken from his left heel pressure ulcer, negative.  Once stabilized, he was discharged home and referred back to [facility] to resume treatment of his wounds.    Patient was hospitalized at [facility] from 12/11 until 12/23/2023, admitted for fever.  Wound infection suspected.  CT scan of abdomen and pelvis for evaluation of sacral pressure injury showed gas tracking down to the bone consistent with osteomyelitis.  He underwent I&D of right ischial ulcer (documented as buttock) with Dr. Bansal, medial tract leading to an abscess was identified.  Cavity was opened allowing it to drain into the main wound bed.  Wound VAC was placed and managed by wound team during this admission.  A bone scan of patient's left foot was also done, initially concerning for osteomyelitis.  Orthopedic surgery was consulted and did not recommend surgical intervention. ID consulted also, recommended the patient to receive IV ertapenem 1 g every 24 hours plus IV daptomycin 8 mg/kg every 24 hours through 1/22/2024.   He was discharged to [facility] on 1/23 for IV antibiotics and wound care.  From the LTAC he was discharged home on 1/22 with home health and referral back to [facility] to resume management of his wounds  .      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  Never used smokeless tobacco    Patient's problem list, allergies, and current medications reviewed and updated in Epic    Interval History:  Interval History thinned 7/29/2022.  Please see previous notes for complete interval history.   Interval History thinned 1/27/2023. Please see previous note for complete interval history.  Interval History thinned 3/3/2023.  Please see previous notes for complete interval history.    Interval History thinned 8/4/2023.  Please see previous notes for complete interval history.    Interval History thinned 1/31/2024.  Please see previous notes for complete interval history.    Interval History thinned 6/26/2024.  Please see previous notes for complete interval history.      6/19/2024: Clinic visit with Steve Hodges MD. Patient denies any fevers or chills. Reports he is tolerating Abx. He has appointment with ID later today to discuss OM treatment. He is tolerating wound VAC. Slight bone exposed bilaterally in ischial wounds, slightly worse.    6/26/2024 : Clinic visit with ADILSON Arthur, FNPEGGY-BC, CWDINESHN, CFTRACI.   Patient presents today with significant deterioration of his both ischial wounds, with increased depth of undermining.   He now has a PICC line, and will be starting outpatient infusions of ertapenem later today.  He states he is feeling well, denies fevers, chills, nausea, vomiting, cough or shortness of breath.  Due to deterioration of his wound, we did discuss possibly having him go over the hospital for surgical debridement and IV antibiotics.  He was hesitant to do so.  We agreed to see how he looks after a week or so IV antibiotics.  He is agreeable to minimizing time up in his wheelchair.  He also agrees to go to the emergency room immediately if he develops any signs or symptoms of infection.    7/10/2024: Clinic visit with Steve Hodges MD. Patient reports feeling in normal state of health. He missed  last appointment as he had fall out of wheelchair and was evaluated in ED. He denies any injuries from fall. Patient wounds are measuring slightly smaller. He continues on Ertapenem. Patient reports that he has appointment for seating evaluation from [facility] scheduled, but does not recall the date.    7/17/2024 : Clinic visit with ADILSON Arthur, HOLDEN, LILIYA VALERIO.   Anjum states he is feeling well.  Left ischial wound measures significantly smaller today, however measurements vary somewhat depending on pt's position.  Both wounds appear to be progressing slightly, with improving tissue quality.    7/24/2024 : Clinic visit with ADILSON Arthur, HOLDEN, IMAN, LILIYA.   Patient continues to feel well, offers no complaints.  Right ischial wound measures smaller, left ischial wound is larger.  Patient tolerating VAC without any difficulty.  Home health continues to see him in between clinic visits.  He is still receiving daily infusions of IV antibiotics, will be completing these in August.    7/31/2024 : Clinic visit with ADILSON Arthur, HOLDEN, IMAN, LILIYA.   Anjum continues to feel well, his wounds are slowly progressing.  Tolerating VAC.  He is on IV antibiotics for 1 more week.  Admits that he is up in his wheelchair for 10 to 14 hours/day.    8/7/2024 : Clinic visit with ADILSON Arthur FNP-BC, LILIYA VALERIO.   Anjum states he is feeling well today, offers no complaints.  Both wounds measure a bit smaller today, new granulation tissue noted.  He will be getting his last IV antibiotic infusion today.    8/14/2024 : Clinic visit with ADILSON Arthur FNP-BC, IMAN, LILIYA.   Patient continues to feel well.  He has completed his antibiotics, followed up with ID earlier this week, no further antibiotic therapy at this time.  Ischial wounds are slowly progressing.  Lower extremity wounds remain resolved.    8/21/2024 : Clinic visit with ADILSON Arthur FNP-BC, LILIYA VALERIO.   Anjum states he is feeling  well, offers no complaints.  His wounds are slowly progressing, increased granulation.    8/28/2024 : Clinic visit with ADILSON Arthur, HOLDEN, LILIYA VALERIO.   Turk states he is feeling well overall.  His wounds measure slightly smaller.  He has had some urinary symptoms, reports leakage from around his suprapubic catheter last night, and excessively full urine bag.  He has been in contact with his urologist office.  He also mentions that he has a recurring small scab to his nose, states that it falls off only to return shortly after.  This has been going on for several months.  I offered to refer him to dermatology.    9/11/2024 : Clinic visit with ADILSON Arthur, HOLDEN, LILIYA VALERIO.   Turk states he is feeling well.  He missed his appointment last week due to car troubles.  His vehicle is now running well.  Ischial wounds show some improvement, increased granulation tissue.  He does have a small reopening of left posterior lower leg wound, appears to be a small abrasion over area of scar tissue.    9/18/2024: Clinic visit with Steve Hodges MD. Patient reports doing ok. Denies any acute issues with wound VAC. Reports that he was fitted by [employer] for new seat cushion and is being manufactured, he is not sure when will be ready. Patient's wounds appears slightly improved. Left posterior leg wound remains open.    9/25/2024 : Clinic visit with ADILSON Arthur, HOLDEN, IMAN, LILIYA.   Patient states he is feeling well.  His wounds measure about the same.    10/2/2024: Clinic visit with HOLDEN Johnson CWON, LILIYA.  Pt denies fevers, chills, nausea, vomiting. Bilateral ischial ulcers increased in area.  Left IT quality overall worse compared to right IT.  Recommend Dakin's soak.  Continue with VAC to bilateral IT.    10/9/2024 : Clinic visit with ADILSON Arthur, HOLDEN, IMAN, LILIYA.   Patient continues to feel well overall.  His wounds measure about the same, no evidence of infection.   He does have some minor breakdown over the scar tissue of his sacrum which bears watching.  He has not heard from [employer] about his seat cushion, states he will contact them again.    10/16/2024 : Clinic visit with ADILSON Arthur, HOLDEN, IMAN, LILIYA.   Anjum continues to feel well.  His wounds measure slightly smaller.  Sacral wound just slightly open.  He called [employer] earlier this week, was told his new cushion is ready, and that they would deliver it next Monday.  He also states he is due for a new wheelchair, but has not yet become process.      10/23/2024 : Clinic visit with ADILSON Arthur, HOLDEN, IMAN, LILIYA.   Anjum's wounds present today with significantly more odor.  He states he is feeling fine, denies fevers, chills, nausea, vomiting, cough or shortness of breath.  Increased drainage noted.  Wounds measure about the same.  Culture collected in clinic today after debridement.  VAC placed on hold.  Wounds packed with Puracyn moistened silver Hydrofiber.   Patient reminded that he is to go to the emergency room if he notices any increased redness, swelling, drainage or odor, or if he develops fever, chills, nausea or vomiting.    He has a new cushion to his wheelchair.  States that he does not yet have a new wheelchair, will be picking on out the next few weeks.    10/30/2024 : Clinic visit with ADILSON Arthur, HOLDEN, IMAN, LILIYA.   Anjum states he is feeling well.  Wound odor still noticeable.  Culture collected last visit was positive for light growth of Proteus.  Given continued odor, will go ahead and prescribe short course of Augmentin, no other p.o. options available based on sensitivities.  Continue with Dakin's wound cleansing.  Home health to restart VAC on Friday 11/6/2024: Clinic visit with Steve Hodges MD. Patient reports doing well, denies any acute issues. Tolerating augmentin well without side effects. Odor much improved today. Wounds are improving slowly. Continue wound  VAC.    11/14/2024: Clinic visit with Steve Hodges MD. Patient reports doing ok. He was frustrated coming into clinic, was having trouble exiting his van. No evidence of wound infection today    11/20/2024: Clinic visit with Steve Hodges MD. Patient reports feeling in normal state of health. His ischial wounds stable and slowly improving. He has reopened sacral wound however. Denies any signs or symptoms of infection.    11/27/2024: Clinic visit with Steve Hodges MD. Patient reports doing well, denies any acute issues. Sacral wound measuring smaller. Right ischial wound smaller. Left ischial wound larger with increased slough and necrotic tissue at base of wound. Patient has contacted [employer] technician to evaluate seat due to reopening of sacrum, he is pending call back.    12/4/2024: Clinic visit with Steve Hodges MD. Patient reports doing well, denies any signs or symptoms of infection. Denies any issues with wound VAC. Sacrum has resolved. Right ischium wound larger with necrotic tissue, left ischium stable. He denies any traumatic events or any changes to his routine that would have increased pressure on right ischium besides time spent in chair during thanksgiving with family.    REVIEW OF SYSTEMS:   Unchanged from previous wound clinic assessment on 11/27/2024, except as noted in interval history above.      PHYSICAL EXAMINATION:   /70   Pulse 82   Temp 36.7 °C (98.1 °F) (Temporal)   Resp 18   SpO2 97%   Physical Exam  Constitutional:       Appearance: He is obese.   Cardiovascular:      Rate and Rhythm: Normal rate.   Pulmonary:      Effort: Pulmonary effort is normal.   Abdominal:      Comments: Colostomy left lower quadrant   Genitourinary:     Comments: Suprapubic catheter to down drain   Skin:     Comments: Stage IV pressure injury to right ischium: Wound larger today with some necrotic tissue and evidence of increased pressure. No evidence of infection.    Stage IV pressure injury  of left ischium: Wound improved, no necrotic tissue noted. Continued thin layer of slough.  Moderate serosanguineous drainage.  No odor.  No periwound erythema or induration    Stage IV pressure injury sacrum: Resolved    All other wounds remain healed, high risk for recurrence     Neurological:      Mental Status: He is alert and oriented to person, place, and time.   Psychiatric:         Mood and Affect: Mood normal.         WOUND ASSESSMENT  Wound 12/12/23 Pressure Injury Ischium Right (Active)   Wound Image    12/04/24 1615   Site Assessment Red;Purple;Yellow 12/04/24 1615   Periwound Assessment Scar tissue;Fragile 12/04/24 1615   Margins Unattached edges 12/04/24 1615   Closure Secondary intention 10/16/24 1700   Drainage Amount Large 12/04/24 1615   Drainage Description Serosanguineous 12/04/24 1615   Treatments Cleansed;Provider debridement 12/04/24 1615   Offloading/DME Other (comment) 12/04/24 1615   Wound Cleansing Hypochlorus Acid 12/04/24 1615   Periwound Protectant Skin Protectant Wipes to Periwound;Drape 12/04/24 1615   Dressing Status Intact;Old drainage 10/02/24 1500   Dressing Changed Changed 12/04/24 1615   Dressing Cleansing/Solutions Normal Saline 12/04/24 1615   Dressing Options Collagen Dressing;Wound Vac;Offloading Dressing - Sacral 12/04/24 1615   Dressing Change/Treatment Frequency Monday, Wednesday, Friday, and As Needed 11/20/24 1605   Wound Team Following Weekly 10/16/24 1700   WOUND NURSE ONLY - Pressure Injury Stage 4 12/04/24 1615   Wound Length (cm) 4.8 cm 12/04/24 1615   Wound Width (cm) 1.8 cm 12/04/24 1615   Wound Depth (cm) 1 cm 12/04/24 1615   Wound Surface Area (cm^2) 8.64 cm^2 12/04/24 1615   Wound Volume (cm^3) 8.64 cm^3 12/04/24 1615   Post-Procedure Length (cm) 4.8 cm 12/04/24 1615   Post-Procedure Width (cm) 1.9 cm 12/04/24 1615   Post-Procedure Depth (cm) 1.2 cm 12/04/24 1615   Post-Procedure Surface Area (cm^2) 9.12 cm^2 12/04/24 1615   Post-Procedure Volume (cm^3) 10.944  cm^3 12/04/24 1615   Wound Healing % 85 12/04/24 1615   Tunneling (cm) 0 cm 12/04/24 1615   Tunneling Clock Position of Wound 2 07/10/24 1615   Undermining (cm) 1.7 cm 12/04/24 1615   Undermining of Wound, 1st Location From 4 o'clock;To 7 o'clock 12/04/24 1615   Undermining (cm) - 2nd location 2 cm 12/04/24 1615   Undermining of Wound, 2nd Location From 12 o'clock;To 1 o'clock 12/04/24 1615   Wound Odor None 12/04/24 1615   Exposed Structures Adipose 12/04/24 1615   Number of days: 358       Wound 05/01/24 Pressure Injury Ischium Left (Active)   Wound Image    12/04/24 1615   Site Assessment Red;Pink;Yellow 12/04/24 1615   Periwound Assessment Fragile;Scar tissue 12/04/24 1615   Margins Unattached edges 12/04/24 1615   Closure Secondary intention 09/18/24 1500   Drainage Amount Large 12/04/24 1615   Drainage Description Serosanguineous 12/04/24 1615   Treatments Cleansed;Provider debridement 12/04/24 1615   Offloading/DME Other (comment) 12/04/24 1615   Wound Cleansing Hypochlorus Acid 12/04/24 1615   Periwound Protectant Skin Protectant Wipes to Periwound;Drape 12/04/24 1615   Dressing Changed Changed 12/04/24 1615   Dressing Cleansing/Solutions Not Applicable 12/04/24 1615   Dressing Options Wound Vac;Offloading Dressing - Sacral 12/04/24 1615   Dressing Change/Treatment Frequency Monday, Wednesday, Friday, and As Needed 12/04/24 1615   Wound Team Following Weekly 10/16/24 1700   WOUND NURSE ONLY - Pressure Injury Stage 4 12/04/24 1615   Non-staged Wound Description Not applicable 09/18/24 1600   Wound Length (cm) 4.9 cm 12/04/24 1615   Wound Width (cm) 2.5 cm 12/04/24 1615   Wound Depth (cm) 2.4 cm 12/04/24 1615   Wound Surface Area (cm^2) 12.25 cm^2 12/04/24 1615   Wound Volume (cm^3) 29.4 cm^3 12/04/24 1615   Post-Procedure Length (cm) 5 cm 12/04/24 1615   Post-Procedure Width (cm) 2.5 cm 12/04/24 1615   Post-Procedure Depth (cm) 2.4 cm 12/04/24 1615   Post-Procedure Surface Area (cm^2) 12.5 cm^2 12/04/24 1615  "  Post-Procedure Volume (cm^3) 30 cm^3 12/04/24 1615   Wound Healing % -[id number] 12/04/24 1615   Tunneling (cm) 0 cm 12/04/24 1615   Undermining (cm) 1 cm 12/04/24 1615   Undermining of Wound, 1st Location From 11 o'clock;To 3 o'clock 12/04/24 1615   Wound Odor None 12/04/24 1615   Exposed Structures None 12/04/24 1615   Number of days: 217       PROCEDURE: Excisional debridement of right and left ischial wounds, sacral wound  -2% viscous lidocaine applied topically to wound bed for approximately 5 minutes prior to debridement  -Curette used to debride wound bed.  Excisional debridement was performed to remove devitalized tissue until healthy, bleeding tissue was visualized.  Total area debrided was approximately 21.62 cm², including into undermined areas of wounds.  Tissue debrided into the muscle / fascia layer.    -Bleeding controlled with manual pressure.    -Wound care completed by wound RN, refer to flowsheet  -Patient tolerated the procedure well, without c/o pain or discomfort.    -No excisional debridement of sacral wound required    Pertinent Labs and Diagnostics:    Labs:     A1c: No results found for: \"HBA1C\"     [facility] results, 7/1/2022 (under media tab)    CRP 13    ESR 31      IMAGING:     X-ray left tib-fib ordered 2/16/2024 through [facility]    12/11/2023-CT of abdomen pelvis with contrast  IMPRESSION:   1.  Right ischial decubitus ulcer extending to bone with soft tissue gas tracking along the right perineum. Appearance suggesting osteomyelitis, consider component of necrotizing fasciitis as clinically appropriate.  2.  Small pericardial effusion  3.  Left adrenal nodule, density on prior noncontrast CT demonstrates adenoma.  4.  Hepatomegaly  5.  Enlarged prostate, workup and evaluation for causes of prostate enlargement recommended as clinically appropriate.  6.  Atherosclerosis and atherosclerotic coronary artery disease    12/15/2023-bone scan of left foot  IMPRESSION:     1.  Mild " increased activity in the LEFT 1st and 3rd toes on blood pool and delayed images possibly indicating inflammation/infection.  2.  No significant blood flow asymmetry.          VASCULAR STUDIES: No results found.    LAST  WOUND CULTURE:   Lab Results   Component Value Date/Time    CULTRSULT Heavy growth usual skin ade. (A) 10/23/2024 03:36 PM    CULTRSULT (A) 10/23/2024 03:36 PM     Proteus mirabilis ESBL  Light growth  Extended Spectrum Beta-lactamase (ESBL) isolated.  ESBL's may be clinically resistant to therapy with  Penicillins,Cephalosporins or Aztreonam despite  apparent in vitro susceptibility to some of these agents.  The patient requires contact isolation.  Please contact pharmacy or an Infectious Disease Specialist  if you have any questions about appropriate therapy.        PATHOLOGY  2/17/2023-bone fragment extracted from left lower extremity wound\\  FINAL DIAGNOSIS:     A. Left leg bone fragment at base of chronic wound:          Extensively degenerated fibrocartilaginous tissue with a rim of           fibrinopurulent debris          Correlate with culture findings            Comment: While no residual intact bone is identified, these           findings are suggestive of adjacent osteomyelitis.      ASSESSMENT AND PLAN:     1. Sacral decubitus ulcer, stage IV ([facility])  Comments: Ulcer first noted in early April 2022 as small open area which quickly enlarged.  This ulcer is present distal from previous sacral ulcer which healed after surgery in January.  Patient has history of flap reconstruction x2 to this area.    12/4/2024: Wound appears resolved today, covered with thin fragile epithelium.  - Continue to protect area with pressure relieving sacral foam dressing.  -Patient does spend a lot of time up in his wheelchair, and wishes to continue to do so for his quality of life.  He lives independently, drives, and is involved with family activities.    -His presents today with a new cushion in his  wheelchair.  Has yet to pick out a new wheelchair  - Known OM that was previously treated. CT scan done during recent hospitalization did not show OM of sacrum, however OM of the ischium noted.  -Patient is very well versed in pressure relief strategies    Wound care: silicone adhesive foam dressing    2. Pressure injury of right ischium, stage 4 ([facility])  Comments: Abscess and OM found on CT during hospitalization in December 2023.  Patient underwent I&D with VAC placement.  IV antibiotics through 1/22/2024.    12/4/2024: Wound measuring larger, increased necrotic tissue and increased undermining.  - Excisional debridement of nonviable tissue from wound in clinic today, medically necessary to promote wound healing  - He denies any acute changes that would have worsened wound, denies any traumatic injury.  - Home health  to continue Dakins soaked gauze to wound for approximately 10 minutes with each dressing change  - Continue wound VAC  -Patient to return to clinic weekly for assessment, debridement, and dressing change.    -Home health to change dressing 2 times per week in between clinic visits.    -Patient is very well versed on pressure relief measures, has adequate surface on bed, alternating low air loss.  - Previously noted odor has resolved.    Wound care: NPWT -125mmHg continuous, black foam    3. Pressure injury of left ischium, stage 4 ([facility])    11/27/2024: Wound improved since last week.  - Excisional debridement of wound in clinic today, medically necessary to promote wound healing.  - Patient to return to clinic weekly for assessment, debridement, and dressing change  - Continue VAC  -Home health to change dressing 2 times per week in between clinic visits.    -Patient has new cushion in his wheelchair, see above  -Patient is very well versed on pressure relief measures, has adequate surface on bed, alternating low air loss.    Wound care: NPWT -125mmHg continuous, black foam    4. Other acute  osteomyelitis, other site ([facility])    12/4/2024: Bone fragments removed during clinic visit in June were sent for pathology, polymicrobial, most concerning was an MDR ESBL Proteus.   -Patient completed IV antibiotics.  No further antibiotics at this time per ID  -Patient understands he has a very low threshold for infection/sepsis.  He understands he is to go to the emergency room immediately if he begins to experience fevers, chills, nausea or vomiting, or if he notices radiating erythema or purulent drainage from his wounds.    5. Postoperative wound dehiscence, subsequent encounter  6. Osteomyelitis of left lower extremity ([facility])  Comments: On 4/26/2022 patient underwent irrigation and debridement of multiple compartments of the left lower extremity states for pressure injury, with bone excision, and complex closure of chronic wound using biologic skin substitute.  During postop visit in surgeons office on 5/11, surgical site was noted to be dehisced.      12/4/2024: Skin remains intact  - Wound waxes and wanes due to pressure and underlying known chronic OM  -Patient to be mindful of offloading when supine, should assure that his leg is not rotating medially  -Monitor site each clinic visit  Wound care: Silicone foam dressing, Tubigrip D    7. Pressure injury of left foot, stage 4 ([facility])  8. Pressure injury of left leg, stage 3 ([facility])  9. Pressure injury of left heel, stage 3 ([facility])    12/4/2024: All of these wounds remain healed  -Monitor pressure points each clinic visit  - Patient aware of offloading.    10. Quadriplegia, C5-C7 incomplete ([facility])  Comments: Complicating factor.  Impaired mobility and sensation  -Patient is still spending 7-8 hours/day up in his wheelchair and knows to reposition frequently.  Wears heel float boots bilaterally at all times  - Patient reports that he has seating evaluation with [employer] upcoming.    Please note that this note may have been created using voice recognition software. I  have worked with technical experts from [facility] to optimize the interface.  I have made every reasonable attempt to correct obvious errors, but there may be errors of grammar and possibly content that I did not discover before finalizing the note.

## 2024-12-05 NOTE — PATIENT INSTRUCTIONS
-You have a wound vac at 125 mmHg continuous pressure with black foam. The dressing will be changed every Monday, Wednesday, and Friday at the wound clinic or by your home health nurse.    -Your wound vac battery lasts approximately six hours. Charge your wound vac machine overnight and when you are at home.    -If your wound vac has a leak, you can seal the leak with additional wound vac drape/tape.    -If you are having issues with your wound vac, please consider patching leaks, changing the canister, or calling [phone] for troubleshooting. If the wound vac has been off or non-operational for over 2 hours, call wound care center to inform them and remove all dressings including black foam and replace with gauze moistened with normal saline.     -Should you experience any significant changes in your wounds, such as signs of infection (increasing redness, swelling, localized heat, increased pain, fever > 101 F, chills) or have any questions regarding your home care instructions, please contact the wound center at [phone]. If after hours, contact your primary care physician or go to the hospital emergency room.

## 2024-12-05 NOTE — PROGRESS NOTES
RN Wound Care Procedures 12/4/2024:  Wound vac removed by this RN. Left and right ischium wounds visually inspected and probed with cotton tipped applicators. No retained foam detected in wounds.      Wound vac reapplied to the left and right ischium wounds using three pieces of black simplace granufoam (one piece into the left ischium wound with tail left out, one strip with attached button bridged from the left ischium to the right flank (above the right hip), and one strip wicked into right ischium wound, then bridged from the right ischium to the right flank (above the right hip). Negative pressure wound therapy resumed at 125 mmHg continuous pressure, no leaks detected.      Sacral Allevyn dressings placed over the locations of the wounds after wound vac application. Convafoam silicone adhesive foam fenestrated and placed around tac pad after wound vac application.     Updated wound care order routed to [facility] via [employer].

## 2024-12-11 ENCOUNTER — OFFICE VISIT (OUTPATIENT)
Dept: WOUND CARE | Facility: MEDICAL CENTER | Age: 74
End: 2024-12-11
Payer: MEDICARE

## 2024-12-11 VITALS
TEMPERATURE: 97.7 F | HEART RATE: 75 BPM | OXYGEN SATURATION: 97 % | RESPIRATION RATE: 18 BRPM | SYSTOLIC BLOOD PRESSURE: 126 MMHG | DIASTOLIC BLOOD PRESSURE: 82 MMHG

## 2024-12-11 DIAGNOSIS — L89.623 PRESSURE INJURY OF LEFT HEEL, STAGE 3 (HCC): ICD-10-CM

## 2024-12-11 DIAGNOSIS — L89.314 PRESSURE INJURY OF RIGHT ISCHIUM, STAGE 4 (HCC): ICD-10-CM

## 2024-12-11 DIAGNOSIS — T81.31XD POSTOPERATIVE WOUND DEHISCENCE, SUBSEQUENT ENCOUNTER: ICD-10-CM

## 2024-12-11 DIAGNOSIS — G82.54 QUADRIPLEGIA, C5-C7, INCOMPLETE (HCC): ICD-10-CM

## 2024-12-11 DIAGNOSIS — M86.9 OSTEOMYELITIS OF LEFT LOWER EXTREMITY (HCC): ICD-10-CM

## 2024-12-11 DIAGNOSIS — L89.894 PRESSURE INJURY OF LEFT FOOT, STAGE 4 (HCC): ICD-10-CM

## 2024-12-11 DIAGNOSIS — L89.154 SACRAL DECUBITUS ULCER, STAGE IV (HCC): ICD-10-CM

## 2024-12-11 DIAGNOSIS — L89.893 PRESSURE INJURY OF LEFT LEG, STAGE 3 (HCC): ICD-10-CM

## 2024-12-11 DIAGNOSIS — L89.324 PRESSURE INJURY OF LEFT ISCHIUM, STAGE 4 (HCC): Primary | ICD-10-CM

## 2024-12-11 DIAGNOSIS — M86.18 OTHER ACUTE OSTEOMYELITIS, OTHER SITE (HCC): ICD-10-CM

## 2024-12-11 PROCEDURE — 3079F DIAST BP 80-89 MM HG: CPT | Performed by: STUDENT IN AN ORGANIZED HEALTH CARE EDUCATION/TRAINING PROGRAM

## 2024-12-11 PROCEDURE — 3074F SYST BP LT 130 MM HG: CPT | Performed by: STUDENT IN AN ORGANIZED HEALTH CARE EDUCATION/TRAINING PROGRAM

## 2024-12-11 PROCEDURE — 11043 DBRDMT MUSC&/FSCA 1ST 20/<: CPT

## 2024-12-11 PROCEDURE — 11043 DBRDMT MUSC&/FSCA 1ST 20/<: CPT | Performed by: STUDENT IN AN ORGANIZED HEALTH CARE EDUCATION/TRAINING PROGRAM

## 2024-12-11 PROCEDURE — 97605 NEG PRS WND THER DME<=50SQCM: CPT

## 2024-12-11 PROCEDURE — 11042 DBRDMT SUBQ TIS 1ST 20SQCM/<: CPT

## 2024-12-11 NOTE — PROGRESS NOTES
Provider Encounter- Pressure Injury        HISTORY OF PRESENT ILLNESS  Wound History:    START OF CARE IN CLINIC: 1/31/2024 (return to clinic after hospitalization)    REFERRING PROVIDER: Racquel York       WOUNDS-superior coccyx pressure injury, stage IV-resolved                                 Coccyx pressure injury, stage IV-resolved           Inferior coccyx pressure injury, stage IV resolved                                 Right ischial pressure injury, stage IV                                 Left heel pressure injury, recurring stage III-resolved                                 Left posterior lower leg/calf-stage III-resolved                                 Left medial lower leg surgical wound dehiscence-resolved, reopens frequently-resolved            Left ischial pressure injury, stage IV-first observed in clinic 5/1/2024          HISTORY: Patient with history of incomplete quadriplegia referred to [facility] for treatment of a stage IV pressure injury.  He has a history of previous pressure injuries to this area, and underwent muscle flaps in 2019, and then again in 2020.  He was seen in the wound clinic in November 2021 for an ulcer proximal from his current ulcer, and pressure injuries to his left posterior lower leg and left heel.  At that time, it was discovered that the patient had retained VAC foam embedded in the wound bed of the sacral wound.  Attempts were made to get him back to his plastic surgeon, though unsuccessful.  In January he underwent surgical removal of VAC sponge along with excisional debridement of his sacral wound by Dr. Chaves.  After the surgery, his wound went on to heal without incident.   In early April 2022, his home health nurse noted a new sacral ulcer, below the previous ulcer which quickly tripled in size over the following weeks.  The ulcer to his left medial lower leg had also deteriorated, with bone visible at the base..  He was hospitalized from 4/22 until 4/27/2022 and  underwent surgery with Dr. Raman on 4/26 for irrigation and debridement of multiple compartments of the left lower extremity, bone excision, and complex closure of chronic wound using biologic skin substitute.   His sacrococcygeal wound was not surgically addressed during this admission.  He was discharged back to his group home, with home health, and referral to outpatient wound clinic for his sacral wound.  He was instructed to follow-up with his surgeon for his lower leg wound.       Postoperatively, the left medial lower leg incision dehisced.  He was seen by his surgeon at [facility] on 5/11.  The surgeon opted to leave remaining sutures in place, and refer him to the wound clinic for treatment of this wound.   Treatment of this wound was initiated in clinic on 5/12.  During this visit was also noted that his heel DTI had resolved, but that he had a new pressure injury to his left posterior lower extremity.     A new pressure injury was noted to patient's right upper buttock/lower back on 5/20/2022.  Wound was linear in shape, skin discolored but intact.     Abrasion noted to left anterior lower leg.  First observed in clinic on 7/22/2022.  Patient states he bumped his leg into his food tray.     Small DTI noted to patient's left lateral lower leg on 7/29/2022.  Skin intact but discolored.     Large area of deep tissue injury noted to patient's left exterior lower leg.  Patient denied any trauma to this area.  Skin intact.  Wound documented.    1/27/2023: Patient was admitted to [facility] from 1/23-1/25/2023 with gross hematuria. He underwent RICHARD which showed watchman device was in place and he was taken off of Xarelto. While hospitalized wound team was consulted. He was referred back to [facility] and home health upon discharge.    Patient was hospitalized at [facility] for pyelonephritis from 2/26 until 3/2/2023, admitted for fever and general malaise.  He was admitted and initially started on linezolid and meropenem for suspected  UTI and history of multidrug-resistant organisms.  Urine cultures were negative. ID was consulted, recommended CT of chest and abdomen,which were negative for acute findings. However, he was treated with 5 days total course of antibiotics for suspected UTI, and symptoms completely resolved.  During this admission, the inpatient wound team was consulted for treatment of his sacral and lower leg wounds.  A wound culture was taken from his left heel pressure ulcer, negative.  Once stabilized, he was discharged home and referred back to [facility] to resume treatment of his wounds.    Patient was hospitalized at [facility] from 12/11 until 12/23/2023, admitted for fever.  Wound infection suspected.  CT scan of abdomen and pelvis for evaluation of sacral pressure injury showed gas tracking down to the bone consistent with osteomyelitis.  He underwent I&D of right ischial ulcer (documented as buttock) with Dr. Bansal, medial tract leading to an abscess was identified.  Cavity was opened allowing it to drain into the main wound bed.  Wound VAC was placed and managed by wound team during this admission.  A bone scan of patient's left foot was also done, initially concerning for osteomyelitis.  Orthopedic surgery was consulted and did not recommend surgical intervention. ID consulted also, recommended the patient to receive IV ertapenem 1 g every 24 hours plus IV daptomycin 8 mg/kg every 24 hours through 1/22/2024.   He was discharged to [facility] on 1/23 for IV antibiotics and wound care.  From the LTAC he was discharged home on 1/22 with home health and referral back to [facility] to resume management of his wounds  .      Pertinent Medical History: Incomplete quadriplegia, history of stage IV pressure injuries, history of flap procedures to sacral pressure injuries, osteomyelitis, obesity, colostomy in place   Contributing factors: Immobility and Obesity, impaired sensation    Personal support: Attendant-staff at [facility] and home health  nursing    TOBACCO USE:   Former smoker, quit in 1977.  Never used smokeless tobacco    Patient's problem list, allergies, and current medications reviewed and updated in Epic    Interval History:  Interval History thinned 7/29/2022.  Please see previous notes for complete interval history.   Interval History thinned 1/27/2023. Please see previous note for complete interval history.  Interval History thinned 3/3/2023.  Please see previous notes for complete interval history.    Interval History thinned 8/4/2023.  Please see previous notes for complete interval history.    Interval History thinned 1/31/2024.  Please see previous notes for complete interval history.    Interval History thinned 6/26/2024.  Please see previous notes for complete interval history.      6/19/2024: Clinic visit with Steve Hodges MD. Patient denies any fevers or chills. Reports he is tolerating Abx. He has appointment with ID later today to discuss OM treatment. He is tolerating wound VAC. Slight bone exposed bilaterally in ischial wounds, slightly worse.    6/26/2024 : Clinic visit with ADILSON Arthur, FNPEGGY-BC, CWDINESHN, CFTRACI.   Patient presents today with significant deterioration of his both ischial wounds, with increased depth of undermining.   He now has a PICC line, and will be starting outpatient infusions of ertapenem later today.  He states he is feeling well, denies fevers, chills, nausea, vomiting, cough or shortness of breath.  Due to deterioration of his wound, we did discuss possibly having him go over the hospital for surgical debridement and IV antibiotics.  He was hesitant to do so.  We agreed to see how he looks after a week or so IV antibiotics.  He is agreeable to minimizing time up in his wheelchair.  He also agrees to go to the emergency room immediately if he develops any signs or symptoms of infection.    7/10/2024: Clinic visit with Steve Hodges MD. Patient reports feeling in normal state of health. He missed  last appointment as he had fall out of wheelchair and was evaluated in ED. He denies any injuries from fall. Patient wounds are measuring slightly smaller. He continues on Ertapenem. Patient reports that he has appointment for seating evaluation from [facility] scheduled, but does not recall the date.    7/17/2024 : Clinic visit with ADILSON Arthur, HOLDEN, LILIYA VALERIO.   Anjum states he is feeling well.  Left ischial wound measures significantly smaller today, however measurements vary somewhat depending on pt's position.  Both wounds appear to be progressing slightly, with improving tissue quality.    7/24/2024 : Clinic visit with ADILSON Arthur, HOLDEN, IMAN, LILIYA.   Patient continues to feel well, offers no complaints.  Right ischial wound measures smaller, left ischial wound is larger.  Patient tolerating VAC without any difficulty.  Home health continues to see him in between clinic visits.  He is still receiving daily infusions of IV antibiotics, will be completing these in August.    7/31/2024 : Clinic visit with ADISLON Arthur, HOLDEN, IMAN, LILIYA.   Anjum continues to feel well, his wounds are slowly progressing.  Tolerating VAC.  He is on IV antibiotics for 1 more week.  Admits that he is up in his wheelchair for 10 to 14 hours/day.    8/7/2024 : Clinic visit with ADILSON Arthur FNP-BC, LILIYA VALERIO.   Anjum states he is feeling well today, offers no complaints.  Both wounds measure a bit smaller today, new granulation tissue noted.  He will be getting his last IV antibiotic infusion today.    8/14/2024 : Clinic visit with ADILSON Arthur FNP-BC, IMAN, LILIYA.   Patient continues to feel well.  He has completed his antibiotics, followed up with ID earlier this week, no further antibiotic therapy at this time.  Ischial wounds are slowly progressing.  Lower extremity wounds remain resolved.    8/21/2024 : Clinic visit with ADILSON Arthur FNP-BC, LILIYA VALERIO.   Anjum states he is feeling  well, offers no complaints.  His wounds are slowly progressing, increased granulation.    8/28/2024 : Clinic visit with ADILSON Arthur, HOLDEN, LILIYA VALERIO.   Turk states he is feeling well overall.  His wounds measure slightly smaller.  He has had some urinary symptoms, reports leakage from around his suprapubic catheter last night, and excessively full urine bag.  He has been in contact with his urologist office.  He also mentions that he has a recurring small scab to his nose, states that it falls off only to return shortly after.  This has been going on for several months.  I offered to refer him to dermatology.    9/11/2024 : Clinic visit with ADILSON Arthur, HOLDEN, LILIYA VALERIO.   Turk states he is feeling well.  He missed his appointment last week due to car troubles.  His vehicle is now running well.  Ischial wounds show some improvement, increased granulation tissue.  He does have a small reopening of left posterior lower leg wound, appears to be a small abrasion over area of scar tissue.    9/18/2024: Clinic visit with Steve Hodges MD. Patient reports doing ok. Denies any acute issues with wound VAC. Reports that he was fitted by [employer] for new seat cushion and is being manufactured, he is not sure when will be ready. Patient's wounds appears slightly improved. Left posterior leg wound remains open.    9/25/2024 : Clinic visit with ADILSON Arthur, HOLDEN, IMAN, LILIYA.   Patient states he is feeling well.  His wounds measure about the same.    10/2/2024: Clinic visit with HOLDEN Johnson CWON, LILIYA.  Pt denies fevers, chills, nausea, vomiting. Bilateral ischial ulcers increased in area.  Left IT quality overall worse compared to right IT.  Recommend Dakin's soak.  Continue with VAC to bilateral IT.    10/9/2024 : Clinic visit with ADILSON Arthur, HOLDEN, IMAN, LILIYA.   Patient continues to feel well overall.  His wounds measure about the same, no evidence of infection.   He does have some minor breakdown over the scar tissue of his sacrum which bears watching.  He has not heard from [employer] about his seat cushion, states he will contact them again.    10/16/2024 : Clinic visit with ADILSON Arthur, HOLDEN, IMAN, LILIYA.   Anjum continues to feel well.  His wounds measure slightly smaller.  Sacral wound just slightly open.  He called [employer] earlier this week, was told his new cushion is ready, and that they would deliver it next Monday.  He also states he is due for a new wheelchair, but has not yet become process.      10/23/2024 : Clinic visit with ADILSON Arthur, HOLDEN, IMAN, LILIYA.   Anjum's wounds present today with significantly more odor.  He states he is feeling fine, denies fevers, chills, nausea, vomiting, cough or shortness of breath.  Increased drainage noted.  Wounds measure about the same.  Culture collected in clinic today after debridement.  VAC placed on hold.  Wounds packed with Puracyn moistened silver Hydrofiber.   Patient reminded that he is to go to the emergency room if he notices any increased redness, swelling, drainage or odor, or if he develops fever, chills, nausea or vomiting.    He has a new cushion to his wheelchair.  States that he does not yet have a new wheelchair, will be picking on out the next few weeks.    10/30/2024 : Clinic visit with ADILSON Arthur, HOLDEN, IMAN, LILIYA.   Anjum states he is feeling well.  Wound odor still noticeable.  Culture collected last visit was positive for light growth of Proteus.  Given continued odor, will go ahead and prescribe short course of Augmentin, no other p.o. options available based on sensitivities.  Continue with Dakin's wound cleansing.  Home health to restart VAC on Friday 11/6/2024: Clinic visit with Steve Hodges MD. Patient reports doing well, denies any acute issues. Tolerating augmentin well without side effects. Odor much improved today. Wounds are improving slowly. Continue wound  VAC.    11/14/2024: Clinic visit with Steve Hodges MD. Patient reports doing ok. He was frustrated coming into clinic, was having trouble exiting his van. No evidence of wound infection today    11/20/2024: Clinic visit with Steve Hodges MD. Patient reports feeling in normal state of health. His ischial wounds stable and slowly improving. He has reopened sacral wound however. Denies any signs or symptoms of infection.    11/27/2024: Clinic visit with Steve Hodges MD. Patient reports doing well, denies any acute issues. Sacral wound measuring smaller. Right ischial wound smaller. Left ischial wound larger with increased slough and necrotic tissue at base of wound. Patient has contacted [employer] technician to evaluate seat due to reopening of sacrum, he is pending call back.    12/4/2024: Clinic visit with Steve Hodges MD. Patient reports doing well, denies any signs or symptoms of infection. Denies any issues with wound VAC. Sacrum has resolved. Right ischium wound larger with necrotic tissue, left ischium stable. He denies any traumatic events or any changes to his routine that would have increased pressure on right ischium besides time spent in chair during thanksgiving with family.    12/11/2024: Clinic visit with Steve Hodges MD. Patient reports doing well, denies any acute issues. Wounds are stable. No further necrosis of right ischium. Patient denies any signs or symptoms of infection.    REVIEW OF SYSTEMS:   Unchanged from previous wound clinic assessment on 12/4/2024, except as noted in interval history above.      PHYSICAL EXAMINATION:   /82   Pulse 75   Temp 36.5 °C (97.7 °F) (Temporal)   Resp 18   SpO2 97%   Physical Exam  Constitutional:       Appearance: He is obese.   Cardiovascular:      Rate and Rhythm: Normal rate.   Pulmonary:      Effort: Pulmonary effort is normal.   Abdominal:      Comments: Colostomy left lower quadrant   Genitourinary:     Comments: Suprapubic catheter  to down drain   Skin:     Comments: Stage IV pressure injury to right ischium: Wound improved, no long necrotic. Continues to have some undermining. No odor or evidence of infection.    Stage IV pressure injury of left ischium: Wound improved, no necrotic tissue noted. Continued thin layer of slough.  Moderate serosanguineous drainage.  No odor.  No periwound erythema or induration    Stage IV pressure injury sacrum: Resolved    All other wounds remain healed, high risk for recurrence     Neurological:      Mental Status: He is alert and oriented to person, place, and time.   Psychiatric:         Mood and Affect: Mood normal.         WOUND ASSESSMENT  Wound 12/12/23 Pressure Injury Ischium Right (Active)   Wound Image    12/11/24 1704   Site Assessment Pink;Red;Yellow 12/11/24 1704   Periwound Assessment Scar tissue;Fragile 12/11/24 1704   Margins Unattached edges 12/11/24 1704   Closure Secondary intention 10/16/24 1700   Drainage Amount Moderate 12/11/24 1704   Drainage Description Serosanguineous 12/11/24 1704   Treatments Cleansed;Provider debridement;Site care 12/11/24 1704   Offloading/DME Other (comment) 12/11/24 1704   Wound Cleansing Hypochlorus Acid 12/11/24 1704   Periwound Protectant No-sting Skin Prep;Hydrocolloid;Drape 12/11/24 1704   Dressing Status Intact;Old drainage 10/02/24 1500   Dressing Changed Changed 12/04/24 1615   Dressing Cleansing/Solutions Not Applicable 12/11/24 1704   Dressing Options Wound Vac;Offloading Dressing - Sacral 12/11/24 1704   Dressing Change/Treatment Frequency Monday, Wednesday, Friday, and As Needed 11/20/24 1605   Wound Team Following Weekly 10/16/24 1700   WOUND NURSE ONLY - Pressure Injury Stage 4 12/11/24 1704   Wound Length (cm) 4 cm 12/11/24 1704   Wound Width (cm) 1.6 cm 12/11/24 1704   Wound Depth (cm) 1.7 cm 12/11/24 1704   Wound Surface Area (cm^2) 6.4 cm^2 12/11/24 1704   Wound Volume (cm^3) 10.88 cm^3 12/11/24 1704   Post-Procedure Length (cm) 4 cm 12/11/24  1704   Post-Procedure Width (cm) 1.6 cm 12/11/24 1704   Post-Procedure Depth (cm) 1.8 cm 12/11/24 1704   Post-Procedure Surface Area (cm^2) 6.4 cm^2 12/11/24 1704   Post-Procedure Volume (cm^3) 11.52 cm^3 12/11/24 1704   Wound Healing % 81 12/11/24 1704   Tunneling (cm) 0 cm 12/11/24 1704   Undermining (cm) 2.2 cm 12/11/24 1704   Undermining of Wound, 1st Location From 4 o'clock;To 5 o'clock 12/11/24 1704   Undermining (cm) - 2nd location 0 cm 12/11/24 1704   Undermining of Wound, 2nd Location From 12 o'clock;To 1 o'clock 12/04/24 1615   Wound Odor None 12/11/24 1704   Exposed Structures Fascia 12/11/24 1704   Number of days: 365       Wound 05/01/24 Pressure Injury Ischium Left (Active)   Wound Image    12/11/24 1704   Site Assessment Pink;Red;Yellow 12/11/24 1704   Periwound Assessment Fragile;Scar tissue 12/11/24 1704   Margins Unattached edges 12/11/24 1704   Closure Secondary intention 09/18/24 1500   Drainage Amount Moderate 12/11/24 1704   Drainage Description Serosanguineous 12/11/24 1704   Treatments Cleansed;Provider debridement;Site care 12/11/24 1704   Offloading/DME Other (comment) 12/11/24 1704   Wound Cleansing Hypochlorus Acid 12/11/24 1704   Periwound Protectant No-sting Skin Prep;Hydrocolloid;Drape 12/11/24 1704   Dressing Changed Changed 12/04/24 1615   Dressing Cleansing/Solutions Not Applicable 12/11/24 1704   Dressing Options Wound Vac;Offloading Dressing - Sacral 12/11/24 1704   Dressing Change/Treatment Frequency Monday, Wednesday, Friday, and As Needed 12/11/24 1704   Wound Team Following Weekly 10/16/24 1700   WOUND NURSE ONLY - Pressure Injury Stage 4 12/11/24 1704   Non-staged Wound Description Not applicable 09/18/24 1600   Wound Length (cm) 3.8 cm 12/11/24 1704   Wound Width (cm) 1.9 cm 12/11/24 1704   Wound Depth (cm) 2.3 cm 12/11/24 1704   Wound Surface Area (cm^2) 7.22 cm^2 12/11/24 1704   Wound Volume (cm^3) 16.606 cm^3 12/11/24 1704   Post-Procedure Length (cm) 3.8 cm 12/11/24 1704  "  Post-Procedure Width (cm) 2 cm 12/11/24 1704   Post-Procedure Depth (cm) 2.3 cm 12/11/24 1704   Post-Procedure Surface Area (cm^2) 7.6 cm^2 12/11/24 1704   Post-Procedure Volume (cm^3) 17.48 cm^3 12/11/24 1704   Wound Healing % -7448 12/11/24 1704   Tunneling (cm) 0 cm 12/11/24 1704   Undermining (cm) 0 cm 12/11/24 1704   Undermining of Wound, 1st Location From 11 o'clock;To 3 o'clock 12/04/24 1615   Wound Odor Mild 12/11/24 1704   Exposed Structures None 12/11/24 1704   Number of days: 224       PROCEDURE: Excisional debridement of right and left ischial wounds, sacral wound  -2% viscous lidocaine applied topically to wound bed for approximately 5 minutes prior to debridement  -Curette used to debride wound bed.  Excisional debridement was performed to remove devitalized tissue until healthy, bleeding tissue was visualized.  Total area debrided was approximately 14 cm², including into undermined areas of wounds.  Tissue debrided into the muscle / fascia layer.    -Bleeding controlled with manual pressure.    -Wound care completed by wound RN, refer to flowsheet  -Patient tolerated the procedure well, without c/o pain or discomfort.    -No excisional debridement of sacral wound required    Pertinent Labs and Diagnostics:    Labs:     A1c: No results found for: \"HBA1C\"     [facility] results, 7/1/2022 (under media tab)    CRP 13    ESR 31      IMAGING:     X-ray left tib-fib ordered 2/16/2024 [facility]    12/11/2023-CT of abdomen pelvis with contrast  IMPRESSION:   1.  Right ischial decubitus ulcer extending to bone with soft tissue gas tracking along the right perineum. Appearance suggesting osteomyelitis, consider component of necrotizing fasciitis as clinically appropriate.  2.  Small pericardial effusion  3.  Left adrenal nodule, density on prior noncontrast CT demonstrates adenoma.  4.  Hepatomegaly  5.  Enlarged prostate, workup and evaluation for causes of prostate enlargement recommended as " clinically appropriate.  6.  Atherosclerosis and atherosclerotic coronary artery disease    12/15/2023-bone scan of left foot  IMPRESSION:     1.  Mild increased activity in the LEFT 1st and 3rd toes on blood pool and delayed images possibly indicating inflammation/infection.  2.  No significant blood flow asymmetry.          VASCULAR STUDIES: No results found.    LAST  WOUND CULTURE:   Lab Results   Component Value Date/Time    CULTRSULT Heavy growth usual skin ade. (A) 10/23/2024 03:36 PM    CULTRSULT (A) 10/23/2024 03:36 PM     Proteus mirabilis ESBL  Light growth  Extended Spectrum Beta-lactamase (ESBL) isolated.  ESBL's may be clinically resistant to therapy with  Penicillins,Cephalosporins or Aztreonam despite  apparent in vitro susceptibility to some of these agents.  The patient requires contact isolation.  Please contact pharmacy or an Infectious Disease Specialist  if you have any questions about appropriate therapy.        PATHOLOGY  2/17/2023-bone fragment extracted from left lower extremity wound\\  FINAL DIAGNOSIS:     A. Left leg bone fragment at base of chronic wound:          Extensively degenerated fibrocartilaginous tissue with a rim of           fibrinopurulent debris          Correlate with culture findings            Comment: While no residual intact bone is identified, these           findings are suggestive of adjacent osteomyelitis.      ASSESSMENT AND PLAN:     1. Sacral decubitus ulcer, stage IV ([facility])  Comments: Ulcer first noted in early April 2022 as small open area which quickly enlarged.  This ulcer is present distal from previous sacral ulcer which healed after surgery in January.  Patient has history of flap reconstruction x2 to this area.    12/11/2024: Wound remains resolved  - Continue to protect area with pressure relieving sacral foam dressing.  -Patient does spend a lot of time up in his wheelchair, and wishes to continue to do so for his quality of life.  He lives  independently, drives, and is involved with family activities.    -His presents today with a new cushion in his wheelchair.  Has yet to pick out a new wheelchair  - Known OM that was previously treated. CT scan done during recent hospitalization did not show OM of sacrum, however OM of the ischium noted.  -Patient is very well versed in pressure relief strategies    Wound care: silicone adhesive foam dressing    2. Pressure injury of right ischium, stage 4 (Formerly [facility])  Comments: Abscess and OM found on CT during hospitalization in December 2023.  Patient underwent I&D with VAC placement.  IV antibiotics through 1/22/2024.    12/11/2024: Wound improved since last visit, less necrotic tissue  - Excisional debridement of nonviable tissue from wound in clinic today, medically necessary to promote wound healing  - He denies any acute changes that would have worsened wound, denies any traumatic injury.  - Home health  to continue Dakins soaked gauze to wound for approximately 10 minutes with each dressing change  - Continue wound VAC  -Patient to return to clinic weekly for assessment, debridement, and dressing change.    -Home health to change dressing 2 times per week in between clinic visits.    -Patient is very well versed on pressure relief measures, has adequate surface on bed, alternating low air loss.  - Previously noted odor has resolved.    Wound care: NPWT -125mmHg continuous, black foam    3. Pressure injury of left ischium, stage 4 ([facility])    12/11/2024: Wound improved.  - Excisional debridement of wound in clinic today, medically necessary to promote wound healing.  - Patient to return to clinic weekly for assessment, debridement, and dressing change  - Continue VAC  -Home health to change dressing 2 times per week in between clinic visits.    -Patient has new cushion in his wheelchair, see above  -Patient is very well versed on pressure relief measures, has adequate surface on bed, alternating low air  loss.    Wound care: NPWT -125mmHg continuous, black foam    4. Other acute osteomyelitis, other site (Formerly [facility])    12/11/2024: Bone fragments removed during clinic visit in June were sent for pathology, polymicrobial, most concerning was an MDR ESBL Proteus.   -Patient completed IV antibiotics.  No further antibiotics at this time per ID  -Patient understands he has a very low threshold for infection/sepsis.  He understands he is to go to the emergency room immediately if he begins to experience fevers, chills, nausea or vomiting, or if he notices radiating erythema or purulent drainage from his wounds.    5. Postoperative wound dehiscence, subsequent encounter  6. Osteomyelitis of left lower extremity (Formerly [facility])  Comments: On 4/26/2022 patient underwent irrigation and debridement of multiple compartments of the left lower extremity states for pressure injury, with bone excision, and complex closure of chronic wound using biologic skin substitute.  During postop visit in surgeons office on 5/11, surgical site was noted to be dehisced.      12/11/2024: Skin remains intact  - Wound waxes and wanes due to pressure and underlying known chronic OM  -Patient to be mindful of offloading when supine, should assure that his leg is not rotating medially  -Monitor site each clinic visit  Wound care: Silicone foam dressing, Tubigrip D    7. Pressure injury of left foot, stage 4 (Formerly [facility])  8. Pressure injury of left leg, stage 3 (Formerly [facility])  9. Pressure injury of left heel, stage 3 (Formerly [facility])    12/11/2024: All of these wounds remain healed  -Monitor pressure points each clinic visit  - Patient aware of offloading.    10. Quadriplegia, C5-C7 incomplete (Formerly [facility])  Comments: Complicating factor.  Impaired mobility and sensation  -Patient is still spending 7-8 hours/day up in his wheelchair and knows to reposition frequently.  Wears heel float boots bilaterally at all times  - Patient reports that he has seating evaluation with [employer] upcoming.    Please  note that this note may have been created using voice recognition software. I have worked with [facility] to optimize the interface.  I have made every reasonable attempt to correct obvious errors, but there may be errors of grammar and possibly content that I did not discover before finalizing the note.

## 2024-12-12 NOTE — PATIENT INSTRUCTIONS
-Keep your wound dressing clean, dry, and intact. Only change dressing if it's over saturated, soiled or falls off.     -Remove your compression wrap if you have severe pain, severe swelling, numbness, color change, or temperature change in your toes. If you need to remove your compression wrap, do so by unrolling it. Do not cut the compression wrap off to prevent cutting yourself on accident.    -Wound vac may not have any drainage in tube or cannister & it will still be working.   Change cannister if it does become full by pressing tab on side of machine to remove canister and snap on new one. Full canister can be thrown in the trash. If cannister fills with bright red blood - go to ER. Dressing will be changed every MWF at the wound clini.  If you are having issues with your wound VAC, please consider patching leaks, changing the canister, or calling [phone] for troubleshooting. If the wound VAC has been off or un-operational for over 2 hours, call wound care center to inform them and remove all dressings including black foam and replace with normal saline damp gauze.     -Should you experience any significant changes in your wound(s), such as infection (redness, swelling, localized heat, increased pain, fever > 101 F, chills) or have any questions regarding your home care instructions, please contact the wound center at [phone]. If after hours, contact your primary care physician or go to the hospital emergency room.  If you are admitted to any hospital, you will need a new referral to come back to the wound clinic and any scheduled appointments that you already have, may be cancelled.

## 2024-12-12 NOTE — PROGRESS NOTES
Wound vac removed by CNA. Left and right ischium wounds visually inspected and probed with cotton tipped applicators. No retained foam detected in wounds.      Wound vac reapplied to the left and right ischium wounds using three pieces of black simplace granufoam (1 piece into the left ischium wound and bridged to left hip, 1 strip from left hip to the right flank (above the right hip), and 1 strip wicked into right ischium wound, then bridged from the right ischium to the right flank (above the right hip). Negative pressure wound therapy resumed at 125 mmHg continuous pressure, no leaks detected.      Sacral Allevyn dressings placed over the locations of the wounds after wound vac application. Sacral Allevyn foam fenestrated and placed around tac pad after wound vac application.     Updated wound care order routed to [facility] via [email or web address].

## 2024-12-16 ENCOUNTER — HOSPITAL ENCOUNTER (OUTPATIENT)
Facility: MEDICAL CENTER | Age: 74
End: 2024-12-16
Payer: MEDICARE

## 2024-12-16 PROCEDURE — 87086 URINE CULTURE/COLONY COUNT: CPT

## 2024-12-18 ENCOUNTER — OFFICE VISIT (OUTPATIENT)
Dept: WOUND CARE | Facility: MEDICAL CENTER | Age: 74
End: 2024-12-18
Payer: MEDICARE

## 2024-12-18 VITALS
RESPIRATION RATE: 20 BRPM | DIASTOLIC BLOOD PRESSURE: 76 MMHG | TEMPERATURE: 97.8 F | SYSTOLIC BLOOD PRESSURE: 130 MMHG | OXYGEN SATURATION: 96 % | HEART RATE: 77 BPM

## 2024-12-18 DIAGNOSIS — L89.154 SACRAL DECUBITUS ULCER, STAGE IV (HCC): ICD-10-CM

## 2024-12-18 DIAGNOSIS — L89.623 PRESSURE INJURY OF LEFT HEEL, STAGE 3 (HCC): ICD-10-CM

## 2024-12-18 DIAGNOSIS — L89.894 PRESSURE INJURY OF LEFT FOOT, STAGE 4 (HCC): ICD-10-CM

## 2024-12-18 DIAGNOSIS — M86.18 OTHER ACUTE OSTEOMYELITIS, OTHER SITE (HCC): ICD-10-CM

## 2024-12-18 DIAGNOSIS — L89.324 PRESSURE INJURY OF LEFT ISCHIUM, STAGE 4 (HCC): ICD-10-CM

## 2024-12-18 DIAGNOSIS — T81.31XD POSTOPERATIVE WOUND DEHISCENCE, SUBSEQUENT ENCOUNTER: ICD-10-CM

## 2024-12-18 DIAGNOSIS — M86.9 OSTEOMYELITIS OF LEFT LOWER EXTREMITY (HCC): ICD-10-CM

## 2024-12-18 DIAGNOSIS — L89.314 PRESSURE INJURY OF RIGHT ISCHIUM, STAGE 4 (HCC): ICD-10-CM

## 2024-12-18 DIAGNOSIS — G82.54 QUADRIPLEGIA, C5-C7, INCOMPLETE (HCC): ICD-10-CM

## 2024-12-18 DIAGNOSIS — L89.893 PRESSURE INJURY OF LEFT LEG, STAGE 3 (HCC): ICD-10-CM

## 2024-12-18 PROCEDURE — 3078F DIAST BP <80 MM HG: CPT | Performed by: STUDENT IN AN ORGANIZED HEALTH CARE EDUCATION/TRAINING PROGRAM

## 2024-12-18 PROCEDURE — 11046 DBRDMT MUSC&/FSCA EA ADDL: CPT | Performed by: STUDENT IN AN ORGANIZED HEALTH CARE EDUCATION/TRAINING PROGRAM

## 2024-12-18 PROCEDURE — 11046 DBRDMT MUSC&/FSCA EA ADDL: CPT

## 2024-12-18 PROCEDURE — 11043 DBRDMT MUSC&/FSCA 1ST 20/<: CPT | Performed by: STUDENT IN AN ORGANIZED HEALTH CARE EDUCATION/TRAINING PROGRAM

## 2024-12-18 PROCEDURE — 3075F SYST BP GE 130 - 139MM HG: CPT | Performed by: STUDENT IN AN ORGANIZED HEALTH CARE EDUCATION/TRAINING PROGRAM

## 2024-12-18 PROCEDURE — 11043 DBRDMT MUSC&/FSCA 1ST 20/<: CPT

## 2024-12-18 NOTE — PROGRESS NOTES
Provider Encounter- Pressure Injury        HISTORY OF PRESENT ILLNESS  Wound History:    START OF CARE IN CLINIC: 1/31/2024 (return to clinic after hospitalization)    REFERRING PROVIDER: Racquel York       WOUNDS-superior coccyx pressure injury, stage IV-resolved                                 Coccyx pressure injury, stage IV-resolved           Inferior coccyx pressure injury, stage IV resolved                                 Right ischial pressure injury, stage IV                                 Left heel pressure injury, recurring stage III-resolved                                 Left posterior lower leg/calf-stage III-resolved                                 Left medial lower leg surgical wound dehiscence-resolved, reopens frequently-resolved            Left ischial pressure injury, stage IV-first observed in clinic 5/1/2024          HISTORY: Patient with history of incomplete quadriplegia referred to [facility] [place] for treatment of a stage IV pressure injury.  He has a history of previous pressure injuries to this area, and underwent muscle flaps in 2019, and then again in 2020.  He was seen in the wound clinic in November 2021 for an ulcer proximal from his current ulcer, and pressure injuries to his left posterior lower leg and left heel.  At that time, it was discovered that the patient had retained VAC foam embedded in the wound bed of the sacral wound.  Attempts were made to get him back to his plastic surgeon, though unsuccessful.  In January he underwent surgical removal of VAC sponge along with excisional debridement of his sacral wound by Dr. Chaves.  After the surgery, his wound went on to heal without incident.   In early April 2022, his home health nurse noted a new sacral ulcer, below the previous ulcer which quickly tripled in size over the following weeks.  The ulcer to his left medial lower leg had also deteriorated, with bone visible at the base..  He was hospitalized from 4/22 until 4/27/2022 and  underwent surgery with Dr. Raman on 4/26 for irrigation and debridement of multiple compartments of the left lower extremity, bone excision, and complex closure of chronic wound using biologic skin substitute.   His sacrococcygeal wound was not surgically addressed during this admission.  He was discharged back to his group home, with home health, and referral to outpatient wound clinic for his sacral wound.  He was instructed to follow-up with his surgeon for his lower leg wound.       Postoperatively, the left medial lower leg incision dehisced.  He was seen by his surgeon at [facility] on 5/11.  The surgeon opted to leave remaining sutures in place, and refer him to the wound clinic for treatment of this wound.   Treatment of this wound was initiated in clinic on 5/12.  During this visit was also noted that his heel DTI had resolved, but that he had a new pressure injury to his left posterior lower extremity.     A new pressure injury was noted to patient's right upper buttock/lower back on 5/20/2022.  Wound was linear in shape, skin discolored but intact.     Abrasion noted to left anterior lower leg.  First observed in clinic on 7/22/2022.  Patient states he bumped his leg into his food tray.     Small DTI noted to patient's left lateral lower leg on 7/29/2022.  Skin intact but discolored.     Large area of deep tissue injury noted to patient's left exterior lower leg.  Patient denied any trauma to this area.  Skin intact.  Wound documented.    1/27/2023: Patient was admitted to [facility] from 1/23-1/25/2023 with gross hematuria. He underwent RICHARD which showed watchman device was in place and he was taken off of Xarelto. While hospitalized wound team was consulted. He was referred back to [facility] and home health upon discharge.    Patient was hospitalized at [facility] for pyelonephritis from 2/26 until 3/2/2023, admitted for fever and general malaise.  He was admitted and initially started on linezolid and meropenem for suspected  UTI and history of multidrug-resistant organisms.  Urine cultures were negative. ID was consulted, recommended CT of chest and abdomen,which were negative for acute findings. However, he was treated with 5 days total course of antibiotics for suspected UTI, and symptoms completely resolved.  During this admission, the inpatient wound team was consulted for treatment of his sacral and lower leg wounds.  A wound culture was taken from his left heel pressure ulcer, negative.  Once stabilized, he was discharged home and referred back to [facility] to resume treatment of his wounds.    Patient was hospitalized at [facility] from 12/11 until 12/23/2023, admitted for fever.  Wound infection suspected.  CT scan of abdomen and pelvis for evaluation of sacral pressure injury showed gas tracking down to the bone consistent with osteomyelitis.  He underwent I&D of right ischial ulcer (documented as buttock) with Dr. Bansal, medial tract leading to an abscess was identified.  Cavity was opened allowing it to drain into the main wound bed.  Wound VAC was placed and managed by wound team during this admission.  A bone scan of patient's left foot was also done, initially concerning for osteomyelitis.  Orthopedic surgery was consulted and did not recommend surgical intervention. ID consulted also, recommended the patient to receive IV ertapenem 1 g every 24 hours plus IV daptomycin 8 mg/kg every 24 hours through 1/22/2024.   He was discharged to [facility] on 1/23 for IV antibiotics and wound care.  From the LTAC he was discharged home on 1/22 with home health and referral back to [facility] to resume management of his wounds  .      Pertinent Medical History: Incomplete quadriplegia, history of stage IV pressure injuries, history of flap procedures to sacral pressure injuries, osteomyelitis, obesity, colostomy in place   Contributing factors: Immobility and Obesity, impaired sensation    Personal support: Attendant-staff at correction and home health  nursing    TOBACCO USE:   Former smoker, quit in 1977.  Never used smokeless tobacco    Patient's problem list, allergies, and current medications reviewed and updated in Epic    Interval History:  Interval History thinned 7/29/2022.  Please see previous notes for complete interval history.   Interval History thinned 1/27/2023. Please see previous note for complete interval history.  Interval History thinned 3/3/2023.  Please see previous notes for complete interval history.    Interval History thinned 8/4/2023.  Please see previous notes for complete interval history.    Interval History thinned 1/31/2024.  Please see previous notes for complete interval history.    Interval History thinned 6/26/2024.  Please see previous notes for complete interval history.      6/19/2024: Clinic visit with Steve Hodges MD. Patient denies any fevers or chills. Reports he is tolerating Abx. He has appointment with ID later today to discuss OM treatment. He is tolerating wound VAC. Slight bone exposed bilaterally in ischial wounds, slightly worse.    6/26/2024 : Clinic visit with ADILSON Arthur, FNPEGGY-BC, CWDINESHN, CFTRACI.   Patient presents today with significant deterioration of his both ischial wounds, with increased depth of undermining.   He now has a PICC line, and will be starting outpatient infusions of ertapenem later today.  He states he is feeling well, denies fevers, chills, nausea, vomiting, cough or shortness of breath.  Due to deterioration of his wound, we did discuss possibly having him go over the hospital for surgical debridement and IV antibiotics.  He was hesitant to do so.  We agreed to see how he looks after a week or so IV antibiotics.  He is agreeable to minimizing time up in his wheelchair.  He also agrees to go to the emergency room immediately if he develops any signs or symptoms of infection.    7/10/2024: Clinic visit with Steve Hodges MD. Patient reports feeling in normal state of health. He missed  last appointment as he had fall out of wheelchair and was evaluated in ED. He denies any injuries from fall. Patient wounds are measuring slightly smaller. He continues on Ertapenem. Patient reports that he has appointment for seating evaluation from [facility] scheduled, but does not recall the date.    7/17/2024 : Clinic visit with ADILSON Arthur, HOLDEN, LILIYA VALERIO.   Anjum states he is feeling well.  Left ischial wound measures significantly smaller today, however measurements vary somewhat depending on pt's position.  Both wounds appear to be progressing slightly, with improving tissue quality.    7/24/2024 : Clinic visit with ADILSON Arthur, HOLDEN, IMAN, LILIYA.   Patient continues to feel well, offers no complaints.  Right ischial wound measures smaller, left ischial wound is larger.  Patient tolerating VAC without any difficulty.  Home health continues to see him in between clinic visits.  He is still receiving daily infusions of IV antibiotics, will be completing these in August.    7/31/2024 : Clinic visit with ADILSON Arthur, HOLDEN, IMAN, LILIYA.   Anjum continues to feel well, his wounds are slowly progressing.  Tolerating VAC.  He is on IV antibiotics for 1 more week.  Admits that he is up in his wheelchair for 10 to 14 hours/day.    8/7/2024 : Clinic visit with ADILSON Arthur FNP-BC, LILIYA VALERIO.   Anjum states he is feeling well today, offers no complaints.  Both wounds measure a bit smaller today, new granulation tissue noted.  He will be getting his last IV antibiotic infusion today.    8/14/2024 : Clinic visit with ADILSON Arthur FNP-BC, IMAN, LILIYA.   Patient continues to feel well.  He has completed his antibiotics, followed up with ID earlier this week, no further antibiotic therapy at this time.  Ischial wounds are slowly progressing.  Lower extremity wounds remain resolved.    8/21/2024 : Clinic visit with ADILSON Arthur FNP-BC, LILIYA VALERIO.   Anjum states he is feeling  well, offers no complaints.  His wounds are slowly progressing, increased granulation.    8/28/2024 : Clinic visit with ADILSON Arthur, HOLDEN, LILIYA VALERIO.   Turk states he is feeling well overall.  His wounds measure slightly smaller.  He has had some urinary symptoms, reports leakage from around his suprapubic catheter last night, and excessively full urine bag.  He has been in contact with his urologist office.  He also mentions that he has a recurring small scab to his nose, states that it falls off only to return shortly after.  This has been going on for several months.  I offered to refer him to dermatology.    9/11/2024 : Clinic visit with ADILSON Arthur, HOLDEN, LILIYA VALERIO.   Turk states he is feeling well.  He missed his appointment last week due to car troubles.  His vehicle is now running well.  Ischial wounds show some improvement, increased granulation tissue.  He does have a small reopening of left posterior lower leg wound, appears to be a small abrasion over area of scar tissue.    9/18/2024: Clinic visit with Steve Hodges MD. Patient reports doing ok. Denies any acute issues with wound VAC. Reports that he was fitted by [employer] for new seat cushion and is being manufactured, he is not sure when will be ready. Patient's wounds appears slightly improved. Left posterior leg wound remains open.    9/25/2024 : Clinic visit with ADILSON Arthur, HOLDEN, IAMN, LILIYA.   Patient states he is feeling well.  His wounds measure about the same.    10/2/2024: Clinic visit with HOLDEN Johnson CWON, LILIYA.  Pt denies fevers, chills, nausea, vomiting. Bilateral ischial ulcers increased in area.  Left IT quality overall worse compared to right IT.  Recommend Dakin's soak.  Continue with VAC to bilateral IT.    10/9/2024 : Clinic visit with ADILSON Arthur, HOLDEN, IMAN, LILIYA.   Patient continues to feel well overall.  His wounds measure about the same, no evidence of infection.   He does have some minor breakdown over the scar tissue of his sacrum which bears watching.  He has not heard from [employer] about his seat cushion, states he will contact them again.    10/16/2024 : Clinic visit with ADILSON Arthur, HOLDEN, IMAN, LILIYA.   Anjum continues to feel well.  His wounds measure slightly smaller.  Sacral wound just slightly open.  He called [employer] earlier this week, was told his new cushion is ready, and that they would deliver it next Monday.  He also states he is due for a new wheelchair, but has not yet become process.      10/23/2024 : Clinic visit with ADILSON Arthur, HOLDEN, IMAN, LILIYA.   Anjum's wounds present today with significantly more odor.  He states he is feeling fine, denies fevers, chills, nausea, vomiting, cough or shortness of breath.  Increased drainage noted.  Wounds measure about the same.  Culture collected in clinic today after debridement.  VAC placed on hold.  Wounds packed with Puracyn moistened silver Hydrofiber.   Patient reminded that he is to go to the emergency room if he notices any increased redness, swelling, drainage or odor, or if he develops fever, chills, nausea or vomiting.    He has a new cushion to his wheelchair.  States that he does not yet have a new wheelchair, will be picking on out the next few weeks.    10/30/2024 : Clinic visit with ADILSON Arthur, HOLDEN, IMAN, LILIYA.   Anjum states he is feeling well.  Wound odor still noticeable.  Culture collected last visit was positive for light growth of Proteus.  Given continued odor, will go ahead and prescribe short course of Augmentin, no other p.o. options available based on sensitivities.  Continue with Dakin's wound cleansing.  Home health to restart VAC on Friday 11/6/2024: Clinic visit with Steve Hodges MD. Patient reports doing well, denies any acute issues. Tolerating augmentin well without side effects. Odor much improved today. Wounds are improving slowly. Continue wound  VAC.    11/14/2024: Clinic visit with Steve Hodges MD. Patient reports doing ok. He was frustrated coming into clinic, was having trouble exiting his van. No evidence of wound infection today    11/20/2024: Clinic visit with Steve Hodges MD. Patient reports feeling in normal state of health. His ischial wounds stable and slowly improving. He has reopened sacral wound however. Denies any signs or symptoms of infection.    11/27/2024: Clinic visit with Steve Hodges MD. Patient reports doing well, denies any acute issues. Sacral wound measuring smaller. Right ischial wound smaller. Left ischial wound larger with increased slough and necrotic tissue at base of wound. Patient has contacted [employer] technician to evaluate seat due to reopening of sacrum, he is pending call back.    12/4/2024: Clinic visit with Steve Hodges MD. Patient reports doing well, denies any signs or symptoms of infection. Denies any issues with wound VAC. Sacrum has resolved. Right ischium wound larger with necrotic tissue, left ischium stable. He denies any traumatic events or any changes to his routine that would have increased pressure on right ischium besides time spent in chair during thanksgiving with family.    12/11/2024: Clinic visit with Steve Hodges MD. Patient reports doing well, denies any acute issues. Wounds are stable. No further necrosis of right ischium. Patient denies any signs or symptoms of infection.    12/18/2024: Clinic visit with Steve Hodges MD. Patient reports doing ok. Reports was diagnosed with UTI by urology, picking up Abx later today, thinks that he was prescribed Augmentin. Right ischial wound measuring larger, dusky tissue concerning for increased pressure. He reports that he has been up in wheelchair more this week with friend in town and has not been using tilt feature of chair as frequently. He was encouraged to spend more time offloading.   Due to holiday's, and dressings only going to be  changed 2x weekly, it is medically necessary to continue wound VAC for now as it would be severely problematic for patient to be sitting with saturated dressings during this period.    REVIEW OF SYSTEMS:   Unchanged from previous wound clinic assessment on 12/11/2024, except as noted in interval history above.      PHYSICAL EXAMINATION:   /76   Pulse 77   Temp 36.6 °C (97.8 °F) (Temporal)   Resp 20   SpO2 96%   Physical Exam  Constitutional:       Appearance: He is obese.   Cardiovascular:      Rate and Rhythm: Normal rate.   Pulmonary:      Effort: Pulmonary effort is normal.   Abdominal:      Comments: Colostomy left lower quadrant   Genitourinary:     Comments: Suprapubic catheter to down drain   Skin:     Comments: Stage IV pressure injury to right ischium: Wound larger, tissue is pale with increased bone exposed and areas of dusky tissue from pressure.    Stage IV pressure injury of left ischium: Wound larger, evidence of excessive pressure with dusky tissue.    Stage IV pressure injury sacrum: Resolved    All other wounds remain healed, high risk for recurrence     Neurological:      Mental Status: He is alert and oriented to person, place, and time.   Psychiatric:         Mood and Affect: Mood normal.         WOUND ASSESSMENT  Wound 12/12/23 Pressure Injury Ischium Right (Active)   Wound Image   12/18/24 1700   Site Assessment Pale;Pink;Purple 12/18/24 1700   Periwound Assessment Excoriated;Callused;Scar tissue 12/18/24 1700   Margins Unattached edges 12/18/24 1700   Closure Secondary intention 10/16/24 1700   Drainage Amount Moderate 12/18/24 1700   Drainage Description Serosanguineous 12/18/24 1700   Treatments Cleansed;Provider debridement 12/18/24 1700   Offloading/DME Other (comment) 12/18/24 1700   Wound Cleansing Hypochlorus Acid 12/18/24 1700   Periwound Protectant Benzoin;Paste Ring;Drape 12/18/24 1700   Dressing Status Intact;Old drainage 10/02/24 1500   Dressing Changed Changed 12/04/24  1615   Dressing Cleansing/Solutions Not Applicable 12/18/24 1700   Dressing Options Wound Vac;Offloading Dressing - Sacral 12/18/24 1700   Dressing Change/Treatment Frequency Monday, Wednesday, Friday, and As Needed 11/20/24 1605   Wound Team Following Weekly 10/16/24 1700   WOUND NURSE ONLY - Pressure Injury Stage 4 12/18/24 1700   Wound Length (cm) 4.5 cm 12/18/24 1700   Wound Width (cm) 1.6 cm 12/18/24 1700   Wound Depth (cm) 1.3 cm 12/18/24 1700   Wound Surface Area (cm^2) 7.2 cm^2 12/18/24 1700   Wound Volume (cm^3) 9.36 cm^3 12/18/24 1700   Post-Procedure Length (cm) 5.6 cm 12/18/24 1700   Post-Procedure Width (cm) 1.7 cm 12/18/24 1700   Post-Procedure Depth (cm) 1.4 cm 12/18/24 1700   Post-Procedure Surface Area (cm^2) 9.52 cm^2 12/18/24 1700   Post-Procedure Volume (cm^3) 13.328 cm^3 12/18/24 1700   Wound Healing % 83 12/18/24 1700   Tunneling (cm) 0 cm 12/18/24 1700   Undermining (cm) 2.2 cm 12/18/24 1700   Undermining of Wound, 1st Location From 12 o'clock;To 8 o'clock 12/18/24 1700   Undermining (cm) - 2nd location 0 cm 12/18/24 1700   Undermining of Wound, 2nd Location From 12 o'clock;To 1 o'clock 12/04/24 1615   Wound Odor Strong;Foul;Other (Comments) 12/18/24 1700   Exposed Structures Bone 12/18/24 1700   Number of days: 373       Wound 05/01/24 Pressure Injury Ischium Left (Active)   Wound Image   12/18/24 1700   Site Assessment Pale;Pink;Purple 12/18/24 1700   Periwound Assessment Excoriated;Maceration 12/18/24 1700   Margins Unattached edges 12/18/24 1700   Closure Secondary intention 09/18/24 1500   Drainage Amount Moderate 12/18/24 1700   Drainage Description Serosanguineous 12/18/24 1700   Treatments Cleansed;Provider debridement 12/18/24 1700   Offloading/DME Other (comment) 12/18/24 1700   Wound Cleansing Hypochlorus Acid 12/18/24 1700   Periwound Protectant Benzoin;Paste Ring;Drape 12/18/24 1700   Dressing Changed Changed 12/04/24 1615   Dressing Cleansing/Solutions Not Applicable 12/18/24  "1700   Dressing Options Wound Vac;Offloading Dressing - Sacral 12/18/24 1700   Dressing Change/Treatment Frequency Monday, Wednesday, Friday, and As Needed 12/18/24 1700   Wound Team Following Weekly 12/18/24 1700   WOUND NURSE ONLY - Pressure Injury Stage 4 12/18/24 1700   Non-staged Wound Description Not applicable 09/18/24 1600   Wound Length (cm) 4.5 cm 12/18/24 1700   Wound Width (cm) 2.5 cm 12/18/24 1700   Wound Depth (cm) 1.2 cm 12/18/24 1700   Wound Surface Area (cm^2) 11.25 cm^2 12/18/24 1700   Wound Volume (cm^3) 13.5 cm^3 12/18/24 1700   Post-Procedure Length (cm) 5.7 cm 12/18/24 1700   Post-Procedure Width (cm) 2.6 cm 12/18/24 1700   Post-Procedure Depth (cm) 1.3 cm 12/18/24 1700   Post-Procedure Surface Area (cm^2) 14.82 cm^2 12/18/24 1700   Post-Procedure Volume (cm^3) 19.266 cm^3 12/18/24 1700   Wound Healing % -6036 12/18/24 1700   Tunneling (cm) 0 cm 12/18/24 1700   Undermining (cm) 1.8 cm 12/18/24 1700   Undermining of Wound, 1st Location From 4 o'clock;To 10 o'clock 12/18/24 1700   Wound Odor None 12/18/24 1700   Exposed Structures Muscle 12/18/24 1700   Number of days: 232       PROCEDURE: Excisional debridement of right and left ischial wounds  -2% viscous lidocaine applied topically to wound bed for approximately 5 minutes prior to debridement  -Curette used to debride wound bed.  Excisional debridement was performed to remove devitalized tissue until healthy, bleeding tissue was visualized.  Total area debrided was approximately 24.34 cm², including into undermined areas of wounds.  Tissue debrided into the muscle / fascia layer.    -Bleeding controlled with manual pressure.    -Wound care completed by wound RN, refer to flowsheet  -Patient tolerated the procedure well, without c/o pain or discomfort.    Pertinent Labs and Diagnostics:    Labs:     A1c: No results found for: \"HBA1C\"     [facility] results, 7/1/2022 (under media tab)    CRP 13    ESR 31      IMAGING:     X-ray left tib-fib ordered " 2/16/2024 through [facility]    12/11/2023-CT of abdomen pelvis with contrast  IMPRESSION:   1.  Right ischial decubitus ulcer extending to bone with soft tissue gas tracking along the right perineum. Appearance suggesting osteomyelitis, consider component of necrotizing fasciitis as clinically appropriate.  2.  Small pericardial effusion  3.  Left adrenal nodule, density on prior noncontrast CT demonstrates adenoma.  4.  Hepatomegaly  5.  Enlarged prostate, workup and evaluation for causes of prostate enlargement recommended as clinically appropriate.  6.  Atherosclerosis and atherosclerotic coronary artery disease    12/15/2023-bone scan of left foot  IMPRESSION:     1.  Mild increased activity in the LEFT 1st and 3rd toes on blood pool and delayed images possibly indicating inflammation/infection.  2.  No significant blood flow asymmetry.          VASCULAR STUDIES: No results found.    LAST  WOUND CULTURE:   Lab Results   Component Value Date/Time    CULTRSULT Heavy growth usual skin ade. (A) 10/23/2024 03:36 PM    CULTRSULT (A) 10/23/2024 03:36 PM     Proteus mirabilis ESBL  Light growth  Extended Spectrum Beta-lactamase (ESBL) isolated.  ESBL's may be clinically resistant to therapy with  Penicillins,Cephalosporins or Aztreonam despite  apparent in vitro susceptibility to some of these agents.  The patient requires contact isolation.  Please contact pharmacy or an Infectious Disease Specialist  if you have any questions about appropriate therapy.        PATHOLOGY  2/17/2023-bone fragment extracted from left lower extremity wound\\  FINAL DIAGNOSIS:     A. Left leg bone fragment at base of chronic wound:          Extensively degenerated fibrocartilaginous tissue with a rim of           fibrinopurulent debris          Correlate with culture findings            Comment: While no residual intact bone is identified, these           findings are suggestive of adjacent osteomyelitis.      ASSESSMENT AND  PLAN:     1. Sacral decubitus ulcer, stage IV (HCC)  Comments: Ulcer first noted in early April 2022 as small open area which quickly enlarged.  This ulcer is present distal from previous sacral ulcer which healed after surgery in January.  Patient has history of flap reconstruction x2 to this area.    12/18/2024: Wound remains resolved  - Continue to protect area with pressure relieving sacral foam dressing.  -Patient does spend a lot of time up in his wheelchair, and wishes to continue to do so for his quality of life.  He lives independently, drives, and is involved with family activities.    -His presents today with a new cushion in his wheelchair.  Has yet to pick out a new wheelchair  - Known OM that was previously treated. CT scan done during recent hospitalization did not show OM of sacrum, however OM of the ischium noted.  -Patient is very well versed in pressure relief strategies    Wound care: silicone adhesive foam dressing    2. Pressure injury of right ischium, stage 4 ([facility])  Comments: Abscess and OM found on CT during hospitalization in December 2023.  Patient underwent I&D with VAC placement.  IV antibiotics through 1/22/2024.    12/18/2024: Wound worse. Suspect increased pressure. He admits to spending more time in chair and not being able to use tilting feature due to friend being in town. Counseled again on the necessity to offload more.  - Excisional debridement of nonviable tissue from wound in clinic today, medically necessary to promote wound healing  - Home health  to continue Dakins soaked gauze to wound for approximately 10 minutes with each dressing change  - Continue wound VAC  -Patient to return to clinic weekly for assessment, debridement, and dressing change.    -Home health to change dressing 2 times per week in between clinic visits.    -Patient is very well versed on pressure relief measures, has adequate surface on bed, alternating low air loss.    Wound care: NPWT -125mmHg  continuous, black foam    3. Pressure injury of left ischium, stage 4 ([facility])    12/18/2024: Wound has not improved see above regarding lack of offloading this week.  - Excisional debridement of wound in clinic today, medically necessary to promote wound healing.  - Patient to return to clinic weekly for assessment, debridement, and dressing change  - Continue VAC  -Home health to change dressing 2 times per week in between clinic visits.    -Patient has new cushion in his wheelchair, see above  -Patient is very well versed on pressure relief measures, has adequate surface on bed, alternating low air loss.    Wound care: NPWT -125mmHg continuous, black foam    4. Other acute osteomyelitis, other site ([facility])    12/18/2024: Bone fragments removed during clinic visit in June were sent for pathology, polymicrobial, most concerning was an MDR ESBL Proteus.   -Patient completed IV antibiotics.  No further antibiotics at this time per ID  -Patient understands he has a very low threshold for infection/sepsis.  He understands he is to go to the emergency room immediately if he begins to experience fevers, chills, nausea or vomiting, or if he notices radiating erythema or purulent drainage from his wounds.    5. Postoperative wound dehiscence, subsequent encounter  6. Osteomyelitis of left lower extremity ([facility])  Comments: On 4/26/2022 patient underwent irrigation and debridement of multiple compartments of the left lower extremity states for pressure injury, with bone excision, and complex closure of chronic wound using biologic skin substitute.  During postop visit in surgeons office on 5/11, surgical site was noted to be dehisced.      12/18/2024: Skin remains intact  - Wound waxes and wanes due to pressure and underlying known chronic OM  -Patient to be mindful of offloading when supine, should assure that his leg is not rotating medially  -Monitor site each clinic visit  Wound care: Silicone foam dressing, Tubigrip D    7.  Pressure injury of left foot, stage 4 ([facility])  8. Pressure injury of left leg, stage 3 ([facility])  9. Pressure injury of left heel, stage 3 ([facility])    12/18/2024: All of these wounds remain healed  -Monitor pressure points each clinic visit  - Patient aware of offloading.    10. Quadriplegia, C5-C7 incomplete ([facility])  Comments: Complicating factor.  Impaired mobility and sensation  -Patient is still spending 7-8 hours/day up in his wheelchair and knows to reposition frequently.  Wears heel float boots bilaterally at all times  - Patient reports that he has seating evaluation with [employer] upcoming.    Please note that this note may have been created using voice recognition software. I have worked with technical experts from [employer] to optimize the interface.  I have made every reasonable attempt to correct obvious errors, but there may be errors of grammar and possibly content that I did not discover before finalizing the note.

## 2024-12-19 LAB
BACTERIA UR CULT: NORMAL
SIGNIFICANT IND 70042: NORMAL
SITE SITE: NORMAL
SOURCE SOURCE: NORMAL

## 2024-12-19 NOTE — WOUND TEAM
HH order entered into [facility] and routed to [facility] via [employer].

## 2024-12-19 NOTE — PATIENT INSTRUCTIONS
-Keep your wound dressing clean, dry, and intact.     -Wound vac may not have any drainage in tube or cannister & it will still be working.   Change cannister if it does become full by pressing tab on side of machine to remove canister and snap on new one. The full canister can be thrown in the trash. If the cannister fills with bright red blood - go to ER. Dressing will be changed every MWF at the wound clinic, or in combination with home health if applicable.  If you are having issues with your wound VAC, please consider patching leaks, changing the canister, or calling [phone] for troubleshooting. If the wound VAC has been off or un-operational for over 2 hours, notify the wound clinic (or home health if you have it) and remove all dressings including black foam and replace with normal saline damp gauze.     -Should you experience any significant changes in your wound(s), such as signs of infection (increasing redness, swelling, localized heat, increased pain, fever > 101 F, chills) or have any questions regarding your home care instructions, please contact the wound center at [phone]. If after hours, contact your primary care physician or go to the hospital emergency room.     If you are admitted to any hospital, you will need a new referral to come back to the wound clinic and any scheduled appointments that you already have may be cancelled.     -If you are 5 or more minutes late for an appointment, we reserve the right to cancel and reschedule that appointment. Additionally, if you are habitually late or not attending appts (3 late cancellations and/or no shows), we reserve the right to cancel your remaining appointments and it will be your responsibility to obtain a new referral if services are still needed.

## 2024-12-23 ENCOUNTER — HOSPITAL ENCOUNTER (OUTPATIENT)
Facility: MEDICAL CENTER | Age: 74
End: 2024-12-23
Payer: MEDICARE

## 2024-12-23 PROCEDURE — 87186 SC STD MICRODIL/AGAR DIL: CPT

## 2024-12-23 PROCEDURE — 87086 URINE CULTURE/COLONY COUNT: CPT

## 2024-12-23 PROCEDURE — 87077 CULTURE AEROBIC IDENTIFY: CPT

## 2025-01-08 ENCOUNTER — OFFICE VISIT (OUTPATIENT)
Dept: WOUND CARE | Facility: MEDICAL CENTER | Age: 75
End: 2025-01-08
Payer: MEDICARE

## 2025-01-08 VITALS
HEART RATE: 83 BPM | SYSTOLIC BLOOD PRESSURE: 132 MMHG | OXYGEN SATURATION: 93 % | TEMPERATURE: 100.3 F | DIASTOLIC BLOOD PRESSURE: 84 MMHG | RESPIRATION RATE: 20 BRPM

## 2025-01-08 DIAGNOSIS — L89.154 SACRAL DECUBITUS ULCER, STAGE IV (HCC): ICD-10-CM

## 2025-01-08 DIAGNOSIS — L89.314 PRESSURE INJURY OF RIGHT ISCHIUM, STAGE 4 (HCC): ICD-10-CM

## 2025-01-08 DIAGNOSIS — T81.31XD POSTOPERATIVE WOUND DEHISCENCE, SUBSEQUENT ENCOUNTER: ICD-10-CM

## 2025-01-08 DIAGNOSIS — L89.324 PRESSURE INJURY OF LEFT ISCHIUM, STAGE 4 (HCC): Primary | ICD-10-CM

## 2025-01-08 DIAGNOSIS — M86.9 OSTEOMYELITIS OF LEFT LOWER EXTREMITY (HCC): ICD-10-CM

## 2025-01-08 DIAGNOSIS — M86.18 OTHER ACUTE OSTEOMYELITIS, OTHER SITE (HCC): ICD-10-CM

## 2025-01-08 DIAGNOSIS — L89.623 PRESSURE INJURY OF LEFT HEEL, STAGE 3 (HCC): ICD-10-CM

## 2025-01-08 DIAGNOSIS — L89.893 PRESSURE INJURY OF LEFT LEG, STAGE 3 (HCC): ICD-10-CM

## 2025-01-08 DIAGNOSIS — L89.894 PRESSURE INJURY OF LEFT FOOT, STAGE 4 (HCC): ICD-10-CM

## 2025-01-08 DIAGNOSIS — G82.54 QUADRIPLEGIA, C5-C7, INCOMPLETE (HCC): ICD-10-CM

## 2025-01-08 PROCEDURE — 11043 DBRDMT MUSC&/FSCA 1ST 20/<: CPT

## 2025-01-08 PROCEDURE — 3079F DIAST BP 80-89 MM HG: CPT | Performed by: STUDENT IN AN ORGANIZED HEALTH CARE EDUCATION/TRAINING PROGRAM

## 2025-01-08 PROCEDURE — 3075F SYST BP GE 130 - 139MM HG: CPT | Performed by: STUDENT IN AN ORGANIZED HEALTH CARE EDUCATION/TRAINING PROGRAM

## 2025-01-08 PROCEDURE — 11042 DBRDMT SUBQ TIS 1ST 20SQCM/<: CPT

## 2025-01-08 PROCEDURE — 11043 DBRDMT MUSC&/FSCA 1ST 20/<: CPT | Performed by: STUDENT IN AN ORGANIZED HEALTH CARE EDUCATION/TRAINING PROGRAM

## 2025-01-09 ENCOUNTER — APPOINTMENT (OUTPATIENT)
Dept: RADIOLOGY | Facility: MEDICAL CENTER | Age: 75
DRG: 193 | End: 2025-01-09
Attending: EMERGENCY MEDICINE
Payer: MEDICARE

## 2025-01-09 ENCOUNTER — HOSPITAL ENCOUNTER (INPATIENT)
Facility: MEDICAL CENTER | Age: 75
LOS: 1 days | DRG: 193 | End: 2025-01-11
Attending: EMERGENCY MEDICINE | Admitting: HOSPITALIST
Payer: MEDICARE

## 2025-01-09 ENCOUNTER — APPOINTMENT (OUTPATIENT)
Dept: CARDIOLOGY | Facility: MEDICAL CENTER | Age: 75
DRG: 193 | End: 2025-01-09
Attending: EMERGENCY MEDICINE
Payer: MEDICARE

## 2025-01-09 ENCOUNTER — TELEPHONE (OUTPATIENT)
Dept: WOUND CARE | Facility: MEDICAL CENTER | Age: 75
End: 2025-01-09

## 2025-01-09 DIAGNOSIS — R79.89 ELEVATED TROPONIN: ICD-10-CM

## 2025-01-09 DIAGNOSIS — J16.0 COMMUNITY ACQUIRED PNEUMONIA DUE TO CHLAMYDIA SPECIES: ICD-10-CM

## 2025-01-09 DIAGNOSIS — L89.314 PRESSURE INJURY OF RIGHT ISCHIUM, STAGE 4 (HCC): ICD-10-CM

## 2025-01-09 DIAGNOSIS — S91.302A OPEN WOUND OF LEFT FOOT, INITIAL ENCOUNTER: ICD-10-CM

## 2025-01-09 DIAGNOSIS — L89.44 PRESSURE INJURY OF CONTIGUOUS REGION INVOLVING BACK AND RIGHT BUTTOCK, STAGE 4 (HCC): ICD-10-CM

## 2025-01-09 DIAGNOSIS — J15.7 PNEUMONIA OF RIGHT UPPER LOBE DUE TO MYCOPLASMA PNEUMONIAE: ICD-10-CM

## 2025-01-09 DIAGNOSIS — L89.154 SACRAL DECUBITUS ULCER, STAGE IV (HCC): ICD-10-CM

## 2025-01-09 DIAGNOSIS — I31.39 PERICARDIAL EFFUSION: ICD-10-CM

## 2025-01-09 DIAGNOSIS — J18.9 COMMUNITY ACQUIRED PNEUMONIA OF RIGHT MIDDLE LOBE OF LUNG: ICD-10-CM

## 2025-01-09 DIAGNOSIS — I50.32 CHRONIC DIASTOLIC HEART FAILURE (HCC): ICD-10-CM

## 2025-01-09 DIAGNOSIS — L89.324 PRESSURE INJURY OF LEFT ISCHIUM, STAGE 4 (HCC): ICD-10-CM

## 2025-01-09 PROBLEM — R07.9 CHEST PAIN: Status: ACTIVE | Noted: 2025-01-09

## 2025-01-09 LAB
ALBUMIN SERPL BCP-MCNC: 3.7 G/DL (ref 3.2–4.9)
ALBUMIN/GLOB SERPL: 1 G/DL
ALP SERPL-CCNC: 80 U/L (ref 30–99)
ALT SERPL-CCNC: 6 U/L (ref 2–50)
ANION GAP SERPL CALC-SCNC: 9 MMOL/L (ref 7–16)
APPEARANCE UR: ABNORMAL
AST SERPL-CCNC: 10 U/L (ref 12–45)
BACTERIA #/AREA URNS HPF: ABNORMAL /HPF
BASOPHILS # BLD AUTO: 0.3 % (ref 0–1.8)
BASOPHILS # BLD: 0.02 K/UL (ref 0–0.12)
BILIRUB SERPL-MCNC: 0.5 MG/DL (ref 0.1–1.5)
BILIRUB UR QL STRIP.AUTO: NEGATIVE
BUN SERPL-MCNC: 13 MG/DL (ref 8–22)
CALCIUM ALBUM COR SERPL-MCNC: 9 MG/DL (ref 8.5–10.5)
CALCIUM SERPL-MCNC: 8.8 MG/DL (ref 8.4–10.2)
CASTS URNS QL MICRO: 0 /LPF (ref 0–2)
CHLORIDE SERPL-SCNC: 102 MMOL/L (ref 96–112)
CO2 SERPL-SCNC: 25 MMOL/L (ref 20–33)
COLOR UR: YELLOW
CREAT SERPL-MCNC: 0.57 MG/DL (ref 0.5–1.4)
EKG IMPRESSION: NORMAL
EOSINOPHIL # BLD AUTO: 0.12 K/UL (ref 0–0.51)
EOSINOPHIL NFR BLD: 1.9 % (ref 0–6.9)
EPITHELIAL CELLS 1715: ABNORMAL /HPF (ref 0–5)
ERYTHROCYTE [DISTWIDTH] IN BLOOD BY AUTOMATED COUNT: 57.1 FL (ref 35.9–50)
FLUAV RNA SPEC QL NAA+PROBE: NEGATIVE
FLUBV RNA SPEC QL NAA+PROBE: NEGATIVE
GFR SERPLBLD CREATININE-BSD FMLA CKD-EPI: 103 ML/MIN/1.73 M 2
GLOBULIN SER CALC-MCNC: 3.8 G/DL (ref 1.9–3.5)
GLUCOSE SERPL-MCNC: 109 MG/DL (ref 65–99)
GLUCOSE UR STRIP.AUTO-MCNC: NEGATIVE MG/DL
HCT VFR BLD AUTO: 41.9 % (ref 42–52)
HGB BLD-MCNC: 13.6 G/DL (ref 14–18)
IMM GRANULOCYTES # BLD AUTO: 0.02 K/UL (ref 0–0.11)
IMM GRANULOCYTES NFR BLD AUTO: 0.3 % (ref 0–0.9)
KETONES UR STRIP.AUTO-MCNC: ABNORMAL MG/DL
LACTATE SERPL-SCNC: 0.9 MMOL/L (ref 0.5–2)
LACTATE SERPL-SCNC: 0.9 MMOL/L (ref 0.5–2)
LEUKOCYTE ESTERASE UR QL STRIP.AUTO: ABNORMAL
LV EJECT FRACT  99904: 55
LYMPHOCYTES # BLD AUTO: 0.65 K/UL (ref 1–4.8)
LYMPHOCYTES NFR BLD: 10.3 % (ref 22–41)
MCH RBC QN AUTO: 32.5 PG (ref 27–33)
MCHC RBC AUTO-ENTMCNC: 32.5 G/DL (ref 32.3–36.5)
MCV RBC AUTO: 100.2 FL (ref 81.4–97.8)
MICRO URNS: ABNORMAL
MONOCYTES # BLD AUTO: 0.82 K/UL (ref 0–0.85)
MONOCYTES NFR BLD AUTO: 13 % (ref 0–13.4)
NEUTROPHILS # BLD AUTO: 4.7 K/UL (ref 1.82–7.42)
NEUTROPHILS NFR BLD: 74.2 % (ref 44–72)
NITRITE UR QL STRIP.AUTO: NEGATIVE
NRBC # BLD AUTO: 0 K/UL
NRBC BLD-RTO: 0 /100 WBC (ref 0–0.2)
NT-PROBNP SERPL IA-MCNC: 352 PG/ML (ref 0–125)
PH UR STRIP.AUTO: 6 [PH] (ref 5–8)
PLATELET # BLD AUTO: 140 K/UL (ref 164–446)
PMV BLD AUTO: 9 FL (ref 9–12.9)
POTASSIUM SERPL-SCNC: 4.3 MMOL/L (ref 3.6–5.5)
PROCALCITONIN SERPL-MCNC: 0.05 NG/ML
PROT SERPL-MCNC: 7.5 G/DL (ref 6–8.2)
PROT UR QL STRIP: 100 MG/DL
RBC # BLD AUTO: 4.18 M/UL (ref 4.7–6.1)
RBC # URNS HPF: ABNORMAL /HPF
RBC UR QL AUTO: ABNORMAL
RSV RNA SPEC QL NAA+PROBE: NEGATIVE
SARS-COV-2 RNA RESP QL NAA+PROBE: NOTDETECTED
SODIUM SERPL-SCNC: 136 MMOL/L (ref 135–145)
SP GR UR STRIP.AUTO: 1.01
SPECIMEN SOURCE: NORMAL
TROPONIN T SERPL-MCNC: 57 NG/L (ref 6–19)
TROPONIN T SERPL-MCNC: 61 NG/L (ref 6–19)
TROPONIN T SERPL-MCNC: 62 NG/L (ref 6–19)
WBC # BLD AUTO: 6.3 K/UL (ref 4.8–10.8)
WBC #/AREA URNS HPF: ABNORMAL /HPF

## 2025-01-09 PROCEDURE — G0378 HOSPITAL OBSERVATION PER HR: HCPCS

## 2025-01-09 PROCEDURE — 93306 TTE W/DOPPLER COMPLETE: CPT

## 2025-01-09 PROCEDURE — 700117 HCHG RX CONTRAST REV CODE 255: Performed by: EMERGENCY MEDICINE

## 2025-01-09 PROCEDURE — 84145 PROCALCITONIN (PCT): CPT

## 2025-01-09 PROCEDURE — 99285 EMERGENCY DEPT VISIT HI MDM: CPT

## 2025-01-09 PROCEDURE — 36415 COLL VENOUS BLD VENIPUNCTURE: CPT

## 2025-01-09 PROCEDURE — 700111 HCHG RX REV CODE 636 W/ 250 OVERRIDE (IP): Mod: JZ | Performed by: EMERGENCY MEDICINE

## 2025-01-09 PROCEDURE — 700105 HCHG RX REV CODE 258: Performed by: HOSPITALIST

## 2025-01-09 PROCEDURE — 83605 ASSAY OF LACTIC ACID: CPT

## 2025-01-09 PROCEDURE — B24BZZZ ULTRASONOGRAPHY OF HEART WITH AORTA: ICD-10-PCS | Performed by: INTERNAL MEDICINE

## 2025-01-09 PROCEDURE — 700102 HCHG RX REV CODE 250 W/ 637 OVERRIDE(OP): Performed by: EMERGENCY MEDICINE

## 2025-01-09 PROCEDURE — 96375 TX/PRO/DX INJ NEW DRUG ADDON: CPT

## 2025-01-09 PROCEDURE — 96372 THER/PROPH/DIAG INJ SC/IM: CPT

## 2025-01-09 PROCEDURE — 51705 CHANGE OF BLADDER TUBE: CPT

## 2025-01-09 PROCEDURE — 87205 SMEAR GRAM STAIN: CPT

## 2025-01-09 PROCEDURE — 93005 ELECTROCARDIOGRAM TRACING: CPT | Mod: TC | Performed by: EMERGENCY MEDICINE

## 2025-01-09 PROCEDURE — 71045 X-RAY EXAM CHEST 1 VIEW: CPT

## 2025-01-09 PROCEDURE — 81001 URINALYSIS AUTO W/SCOPE: CPT

## 2025-01-09 PROCEDURE — 99223 1ST HOSP IP/OBS HIGH 75: CPT | Mod: AI | Performed by: HOSPITALIST

## 2025-01-09 PROCEDURE — 93306 TTE W/DOPPLER COMPLETE: CPT | Mod: 26 | Performed by: INTERNAL MEDICINE

## 2025-01-09 PROCEDURE — 87077 CULTURE AEROBIC IDENTIFY: CPT | Mod: 91

## 2025-01-09 PROCEDURE — A9270 NON-COVERED ITEM OR SERVICE: HCPCS | Performed by: HOSPITALIST

## 2025-01-09 PROCEDURE — 87040 BLOOD CULTURE FOR BACTERIA: CPT

## 2025-01-09 PROCEDURE — 700102 HCHG RX REV CODE 250 W/ 637 OVERRIDE(OP): Performed by: HOSPITALIST

## 2025-01-09 PROCEDURE — 71275 CT ANGIOGRAPHY CHEST: CPT

## 2025-01-09 PROCEDURE — 0241U HCHG SARS-COV-2 COVID-19 NFCT DS RESP RNA 4 TRGT MIC: CPT

## 2025-01-09 PROCEDURE — 0202U NFCT DS 22 TRGT SARS-COV-2: CPT

## 2025-01-09 PROCEDURE — A9270 NON-COVERED ITEM OR SERVICE: HCPCS | Performed by: EMERGENCY MEDICINE

## 2025-01-09 PROCEDURE — 84484 ASSAY OF TROPONIN QUANT: CPT

## 2025-01-09 PROCEDURE — 87086 URINE CULTURE/COLONY COUNT: CPT

## 2025-01-09 PROCEDURE — 700111 HCHG RX REV CODE 636 W/ 250 OVERRIDE (IP): Performed by: HOSPITALIST

## 2025-01-09 PROCEDURE — 83880 ASSAY OF NATRIURETIC PEPTIDE: CPT

## 2025-01-09 PROCEDURE — 85025 COMPLETE CBC W/AUTO DIFF WBC: CPT

## 2025-01-09 PROCEDURE — 80053 COMPREHEN METABOLIC PANEL: CPT

## 2025-01-09 PROCEDURE — 96374 THER/PROPH/DIAG INJ IV PUSH: CPT

## 2025-01-09 RX ORDER — LABETALOL HYDROCHLORIDE 5 MG/ML
10 INJECTION, SOLUTION INTRAVENOUS EVERY 4 HOURS PRN
Status: DISCONTINUED | OUTPATIENT
Start: 2025-01-09 | End: 2025-01-11 | Stop reason: HOSPADM

## 2025-01-09 RX ORDER — SODIUM HYPOCHLORITE 1.25 MG/ML
SOLUTION TOPICAL 2 TIMES DAILY
Status: DISCONTINUED | OUTPATIENT
Start: 2025-01-10 | End: 2025-01-10

## 2025-01-09 RX ORDER — OXYBUTYNIN CHLORIDE 5 MG/1
5 TABLET, EXTENDED RELEASE ORAL EVERY EVENING
Status: DISCONTINUED | OUTPATIENT
Start: 2025-01-09 | End: 2025-01-11 | Stop reason: HOSPADM

## 2025-01-09 RX ORDER — SOLIFENACIN SUCCINATE 10 MG/1
5 TABLET, FILM COATED ORAL DAILY
Status: DISCONTINUED | OUTPATIENT
Start: 2025-01-09 | End: 2025-01-09

## 2025-01-09 RX ORDER — AZITHROMYCIN 250 MG/1
500 TABLET, FILM COATED ORAL ONCE
Status: COMPLETED | OUTPATIENT
Start: 2025-01-09 | End: 2025-01-09

## 2025-01-09 RX ORDER — AMLODIPINE BESYLATE 5 MG/1
2.5 TABLET ORAL
Status: DISCONTINUED | OUTPATIENT
Start: 2025-01-09 | End: 2025-01-11

## 2025-01-09 RX ORDER — ACETAMINOPHEN 325 MG/1
650 TABLET ORAL EVERY 6 HOURS PRN
Status: DISCONTINUED | OUTPATIENT
Start: 2025-01-09 | End: 2025-01-11 | Stop reason: HOSPADM

## 2025-01-09 RX ORDER — CEFTRIAXONE 2 G/1
2000 INJECTION, POWDER, FOR SOLUTION INTRAMUSCULAR; INTRAVENOUS ONCE
Status: COMPLETED | OUTPATIENT
Start: 2025-01-09 | End: 2025-01-09

## 2025-01-09 RX ORDER — AMLODIPINE BESYLATE 2.5 MG/1
2.5 TABLET ORAL
COMMUNITY

## 2025-01-09 RX ORDER — AZITHROMYCIN 250 MG/1
500 TABLET, FILM COATED ORAL DAILY
Status: COMPLETED | OUTPATIENT
Start: 2025-01-10 | End: 2025-01-11

## 2025-01-09 RX ORDER — SODIUM CHLORIDE 9 MG/ML
INJECTION, SOLUTION INTRAVENOUS CONTINUOUS
Status: ACTIVE | OUTPATIENT
Start: 2025-01-09 | End: 2025-01-10

## 2025-01-09 RX ORDER — ONDANSETRON 4 MG/1
4 TABLET, ORALLY DISINTEGRATING ORAL EVERY 4 HOURS PRN
Status: DISCONTINUED | OUTPATIENT
Start: 2025-01-09 | End: 2025-01-11 | Stop reason: HOSPADM

## 2025-01-09 RX ORDER — HEPARIN SODIUM 5000 [USP'U]/ML
5000 INJECTION, SOLUTION INTRAVENOUS; SUBCUTANEOUS EVERY 8 HOURS
Status: DISCONTINUED | OUTPATIENT
Start: 2025-01-09 | End: 2025-01-11 | Stop reason: HOSPADM

## 2025-01-09 RX ORDER — DOCUSATE SODIUM 100 MG/1
200 CAPSULE, LIQUID FILLED ORAL 2 TIMES DAILY
Status: DISCONTINUED | OUTPATIENT
Start: 2025-01-09 | End: 2025-01-11 | Stop reason: HOSPADM

## 2025-01-09 RX ORDER — COLCHICINE 0.6 MG/1
0.6 TABLET ORAL DAILY
Status: DISCONTINUED | OUTPATIENT
Start: 2025-01-09 | End: 2025-01-10

## 2025-01-09 RX ORDER — AMOXICILLIN 250 MG
2 CAPSULE ORAL EVERY EVENING
Status: DISCONTINUED | OUTPATIENT
Start: 2025-01-09 | End: 2025-01-11 | Stop reason: HOSPADM

## 2025-01-09 RX ORDER — METHENAMINE HIPPURATE 1000 MG/1
1 TABLET ORAL 2 TIMES DAILY
COMMUNITY
End: 2025-01-15 | Stop reason: SINTOL

## 2025-01-09 RX ORDER — BACLOFEN 10 MG/1
20 TABLET ORAL 4 TIMES DAILY
Status: DISCONTINUED | OUTPATIENT
Start: 2025-01-09 | End: 2025-01-11 | Stop reason: HOSPADM

## 2025-01-09 RX ORDER — POLYETHYLENE GLYCOL 3350 17 G/17G
1 POWDER, FOR SOLUTION ORAL
Status: DISCONTINUED | OUTPATIENT
Start: 2025-01-09 | End: 2025-01-11 | Stop reason: HOSPADM

## 2025-01-09 RX ORDER — LOSARTAN POTASSIUM 50 MG/1
50 TABLET ORAL EVERY EVENING
Status: DISCONTINUED | OUTPATIENT
Start: 2025-01-09 | End: 2025-01-11 | Stop reason: HOSPADM

## 2025-01-09 RX ORDER — ASPIRIN 81 MG/1
81 TABLET ORAL DAILY
Status: DISCONTINUED | OUTPATIENT
Start: 2025-01-10 | End: 2025-01-11 | Stop reason: HOSPADM

## 2025-01-09 RX ORDER — ONDANSETRON 2 MG/ML
4 INJECTION INTRAMUSCULAR; INTRAVENOUS EVERY 4 HOURS PRN
Status: DISCONTINUED | OUTPATIENT
Start: 2025-01-09 | End: 2025-01-11 | Stop reason: HOSPADM

## 2025-01-09 RX ADMIN — LOSARTAN POTASSIUM 50 MG: 50 TABLET, FILM COATED ORAL at 23:44

## 2025-01-09 RX ADMIN — DOCUSATE SODIUM 200 MG: 100 CAPSULE, LIQUID FILLED ORAL at 21:30

## 2025-01-09 RX ADMIN — SODIUM CHLORIDE: 9 INJECTION, SOLUTION INTRAVENOUS at 18:12

## 2025-01-09 RX ADMIN — AZITHROMYCIN DIHYDRATE 500 MG: 250 TABLET, FILM COATED ORAL at 13:57

## 2025-01-09 RX ADMIN — Medication 10 MG: at 21:30

## 2025-01-09 RX ADMIN — SENNOSIDES AND DOCUSATE SODIUM 2 TABLET: 50; 8.6 TABLET ORAL at 18:11

## 2025-01-09 RX ADMIN — HUMAN ALBUMIN MICROSPHERES AND PERFLUTREN 3 ML: 10; .22 INJECTION, SOLUTION INTRAVENOUS at 15:00

## 2025-01-09 RX ADMIN — HEPARIN SODIUM 5000 UNITS: 5000 INJECTION, SOLUTION INTRAVENOUS; SUBCUTANEOUS at 21:30

## 2025-01-09 RX ADMIN — BACLOFEN 20 MG: 10 TABLET ORAL at 18:10

## 2025-01-09 RX ADMIN — OXYBUTYNIN CHLORIDE 5 MG: 5 TABLET, EXTENDED RELEASE ORAL at 18:10

## 2025-01-09 RX ADMIN — CEFTRIAXONE SODIUM 2000 MG: 2 INJECTION, POWDER, FOR SOLUTION INTRAMUSCULAR; INTRAVENOUS at 13:57

## 2025-01-09 RX ADMIN — BACLOFEN 20 MG: 10 TABLET ORAL at 21:30

## 2025-01-09 RX ADMIN — POLYETHYLENE GLYCOL 3350 1 PACKET: 17 POWDER, FOR SOLUTION ORAL at 18:11

## 2025-01-09 RX ADMIN — COLCHICINE 0.6 MG: 0.6 TABLET, FILM COATED ORAL at 18:10

## 2025-01-09 RX ADMIN — IOHEXOL 75 ML: 350 INJECTION, SOLUTION INTRAVENOUS at 12:19

## 2025-01-09 SDOH — ECONOMIC STABILITY: TRANSPORTATION INSECURITY
IN THE PAST 12 MONTHS, HAS LACK OF RELIABLE TRANSPORTATION KEPT YOU FROM MEDICAL APPOINTMENTS, MEETINGS, WORK OR FROM GETTING THINGS NEEDED FOR DAILY LIVING?: NO

## 2025-01-09 ASSESSMENT — PATIENT HEALTH QUESTIONNAIRE - PHQ9
1. LITTLE INTEREST OR PLEASURE IN DOING THINGS: NOT AT ALL
SUM OF ALL RESPONSES TO PHQ9 QUESTIONS 1 AND 2: 0
SUM OF ALL RESPONSES TO PHQ9 QUESTIONS 1 AND 2: 0
2. FEELING DOWN, DEPRESSED, IRRITABLE, OR HOPELESS: NOT AT ALL
1. LITTLE INTEREST OR PLEASURE IN DOING THINGS: NOT AT ALL
2. FEELING DOWN, DEPRESSED, IRRITABLE, OR HOPELESS: NOT AT ALL

## 2025-01-09 ASSESSMENT — ENCOUNTER SYMPTOMS
BLOOD IN STOOL: 0
EYES NEGATIVE: 1
NEUROLOGICAL NEGATIVE: 1
CONSTIPATION: 0
HEMOPTYSIS: 0
DIZZINESS: 0
NAUSEA: 0
CHILLS: 0
MYALGIAS: 0
NERVOUS/ANXIOUS: 0
HEADACHES: 0
SENSORY CHANGE: 0
GASTROINTESTINAL NEGATIVE: 1
DEPRESSION: 0
SORE THROAT: 0
PALPITATIONS: 0
COUGH: 1
DIAPHORESIS: 0
DIARRHEA: 0
HEARTBURN: 0
VOMITING: 0
MUSCULOSKELETAL NEGATIVE: 1
WHEEZING: 0
BRUISES/BLEEDS EASILY: 0
SEIZURES: 0
LOSS OF CONSCIOUSNESS: 0
BLURRED VISION: 0
PSYCHIATRIC NEGATIVE: 1
FOCAL WEAKNESS: 0
FEVER: 0
DOUBLE VISION: 0
ABDOMINAL PAIN: 0

## 2025-01-09 ASSESSMENT — FIBROSIS 4 INDEX
FIB4 SCORE: 2.97
FIB4 SCORE: 2.16
FIB4 SCORE: 2.16

## 2025-01-09 ASSESSMENT — COGNITIVE AND FUNCTIONAL STATUS - GENERAL
HELP NEEDED FOR BATHING: A LOT
SUGGESTED CMS G CODE MODIFIER MOBILITY: CM
DRESSING REGULAR UPPER BODY CLOTHING: A LOT
STANDING UP FROM CHAIR USING ARMS: TOTAL
DAILY ACTIVITIY SCORE: 14
MOVING TO AND FROM BED TO CHAIR: A LOT
DRESSING REGULAR LOWER BODY CLOTHING: A LOT
PERSONAL GROOMING: A LOT
MOVING FROM LYING ON BACK TO SITTING ON SIDE OF FLAT BED: A LOT
TOILETING: A LOT
MOBILITY SCORE: 9
CLIMB 3 TO 5 STEPS WITH RAILING: TOTAL
WALKING IN HOSPITAL ROOM: TOTAL
TURNING FROM BACK TO SIDE WHILE IN FLAT BAD: A LOT
SUGGESTED CMS G CODE MODIFIER DAILY ACTIVITY: CK

## 2025-01-09 ASSESSMENT — PAIN DESCRIPTION - PAIN TYPE: TYPE: ACUTE PAIN

## 2025-01-09 ASSESSMENT — LIFESTYLE VARIABLES
EVER HAD A DRINK FIRST THING IN THE MORNING TO STEADY YOUR NERVES TO GET RID OF A HANGOVER: NO
AVERAGE NUMBER OF DAYS PER WEEK YOU HAVE A DRINK CONTAINING ALCOHOL: 7
EVER FELT BAD OR GUILTY ABOUT YOUR DRINKING: NO
ON A TYPICAL DAY WHEN YOU DRINK ALCOHOL HOW MANY DRINKS DO YOU HAVE: 2
HOW MANY TIMES IN THE PAST YEAR HAVE YOU HAD 5 OR MORE DRINKS IN A DAY: 0
TOTAL SCORE: 0
TOTAL SCORE: 0
HAVE PEOPLE ANNOYED YOU BY CRITICIZING YOUR DRINKING: NO
ALCOHOL_USE: YES
TOTAL SCORE: 0
DOES PATIENT WANT TO STOP DRINKING: NO
CONSUMPTION TOTAL: NEGATIVE
HAVE YOU EVER FELT YOU SHOULD CUT DOWN ON YOUR DRINKING: NO

## 2025-01-09 ASSESSMENT — SOCIAL DETERMINANTS OF HEALTH (SDOH)
WITHIN THE PAST 12 MONTHS, THE FOOD YOU BOUGHT JUST DIDN'T LAST AND YOU DIDN'T HAVE MONEY TO GET MORE: NEVER TRUE
WITHIN THE PAST 12 MONTHS, YOU WORRIED THAT YOUR FOOD WOULD RUN OUT BEFORE YOU GOT THE MONEY TO BUY MORE: NEVER TRUE
WITHIN THE LAST YEAR, HAVE YOU BEEN HUMILIATED OR EMOTIONALLY ABUSED IN OTHER WAYS BY YOUR PARTNER OR EX-PARTNER?: NO
IN THE PAST 12 MONTHS, HAS THE ELECTRIC, GAS, OIL, OR WATER COMPANY THREATENED TO SHUT OFF SERVICE IN YOUR HOME?: NO
WITHIN THE LAST YEAR, HAVE TO BEEN RAPED OR FORCED TO HAVE ANY KIND OF SEXUAL ACTIVITY BY YOUR PARTNER OR EX-PARTNER?: NO
WITHIN THE LAST YEAR, HAVE YOU BEEN AFRAID OF YOUR PARTNER OR EX-PARTNER?: NO
WITHIN THE LAST YEAR, HAVE YOU BEEN KICKED, HIT, SLAPPED, OR OTHERWISE PHYSICALLY HURT BY YOUR PARTNER OR EX-PARTNER?: NO

## 2025-01-09 NOTE — PATIENT INSTRUCTIONS
-Keep dressings clean and dry. Change dressings if they become over saturated, soiled or fall off.     -On the days you are not changing your dressings, avoid getting the dressings wet. It is not harmful to get your wound wet when you are washing your wound before applying a new dressing.    -If you need to change your dressings at home: Wash your wound with normal saline, wound cleanser, or unscented soap and water prior to applying your new dressings. Please avoid cleansing with hydrogen peroxide or rubbing alcohol. Hydrogen peroxide and rubbing alcohol are toxic to new tissue and skin cells.    -Avoid prolonged sitting.    -Should you experience any significant changes in your wound(s), such as signs of infection (increasing redness, swelling, localized heat, increased pain, fever > 101 F, chills) or have any questions regarding your home care instructions, please contact the wound center at [phone]. If after hours, contact your primary care physician or go to the hospital emergency room.     -If you are admitted to any hospital, you will need a new referral to come back to the wound clinic and any scheduled appointments that you already have, may be cancelled.     -If you are 5 or more minutes late for an appointment, we reserve the right to cancel and reschedule that appointment. Additionally, if you are habitually late or not showing (3 late cancellations and/or no shows), we reserve the right to cancel your remaining appointments and it will be your responsibility to obtain a new referral if services are still needed.

## 2025-01-09 NOTE — ASSESSMENT & PLAN NOTE
Status post Watchman procedure patient's cardiac status has been stabilized     1/10/2025   Heart rate 50s-70s.
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
## 2025-01-09 NOTE — ED NOTES
Superpubic cath replaced by Dr Fields  Po /IV abx completed  Poc explained to pt.  Room assignment pending

## 2025-01-09 NOTE — ASSESSMENT & PLAN NOTE
Echocardiogram ordered to evaluate the size of the effusion  Colchicine ordered in case patient does have pericarditis  Cardiology consultation requested and they will follow     1/10/2025   Echo shows no pericardial effusion.  The patient currently has no chest pain.  I will discontinue colchicine

## 2025-01-09 NOTE — ED TRIAGE NOTES
"Patient presents to the ER with the following complaints:    Chief Complaint   Patient presents with    Cough     Persistent the past few weeks.        /73   Pulse 74   Temp 36.8 °C (98.2 °F) (Temporal)   Resp 18   Ht 1.778 m (5' 10\")   Wt 97.5 kg (215 lb)   SpO2 93%   BMI 30.85 kg/m²       "

## 2025-01-09 NOTE — ASSESSMENT & PLAN NOTE
Optimize blood pressure management keep systolic blood pressure less than 140 diastolic under 90  Continue with Norvasc 2.5 mg daily, losartan 50 mg daily  As needed labetalol    1/10/2025   Blood pressure has been stable

## 2025-01-09 NOTE — ASSESSMENT & PLAN NOTE
The ASCVD Risk score (Jia TRACEY, et al., 2019) failed to calculate for the following reasons:    The valid total cholesterol range is 130 to 320 mg/dL  Patient reports chest pain and coughing.  This has been ongoing for several days  The chest pain seems to be secondary to pericardial effusion and elevated troponin with it  Continue troponin monitoring  Cardiology consulted  Echocardiogram ordered and will be followed   High likelihood of pericarditis thus colchicine started    1/10/2025   Chest pain resolved.  Echo shows no effusion.  I will stop colchicine
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
## 2025-01-09 NOTE — ASSESSMENT & PLAN NOTE
Body mass index is 30.85 kg/m².  Outpatient weight loss management program and lifestyle modification highly recommended

## 2025-01-09 NOTE — PROGRESS NOTES
Home health orders routed to [facility] via [facility].  Wound vac hold initiated this visit. [place] notified via [employer] online portal.

## 2025-01-09 NOTE — PROGRESS NOTES
Provider Encounter- Pressure Injury        HISTORY OF PRESENT ILLNESS  Wound History:    START OF CARE IN CLINIC: 1/31/2024 (return to clinic after hospitalization)    REFERRING PROVIDER: Racquel York       WOUNDS-superior coccyx pressure injury, stage IV-resolved                                 Coccyx pressure injury, stage IV-resolved           Inferior coccyx pressure injury, stage IV resolved                                 Right ischial pressure injury, stage IV                                 Left heel pressure injury, recurring stage III-resolved                                 Left posterior lower leg/calf-stage III-resolved                                 Left medial lower leg surgical wound dehiscence-resolved, reopens frequently-resolved            Left ischial pressure injury, stage IV-first observed in clinic 5/1/2024          HISTORY: Patient with history of incomplete quadriplegia referred to [facility] for treatment of a stage IV pressure injury.  He has a history of previous pressure injuries to this area, and underwent muscle flaps in 2019, and then again in 2020.  He was seen in the wound clinic in November 2021 for an ulcer proximal from his current ulcer, and pressure injuries to his left posterior lower leg and left heel.  At that time, it was discovered that the patient had retained VAC foam embedded in the wound bed of the sacral wound.  Attempts were made to get him back to his plastic surgeon, though unsuccessful.  In January he underwent surgical removal of VAC sponge along with excisional debridement of his sacral wound by Dr. Chaves.  After the surgery, his wound went on to heal without incident.   In early April 2022, his home health nurse noted a new sacral ulcer, below the previous ulcer which quickly tripled in size over the following weeks.  The ulcer to his left medial lower leg had also deteriorated, with bone visible at the base..  He was hospitalized from 4/22 until 4/27/2022 and  underwent surgery with Dr. Raman on 4/26 for irrigation and debridement of multiple compartments of the left lower extremity, bone excision, and complex closure of chronic wound using biologic skin substitute.   His sacrococcygeal wound was not surgically addressed during this admission.  He was discharged back to his group home, with home health, and referral to outpatient wound clinic for his sacral wound.  He was instructed to follow-up with his surgeon for his lower leg wound.       Postoperatively, the left medial lower leg incision dehisced.  He was seen by his surgeon at [facility] on 5/11.  The surgeon opted to leave remaining sutures in place, and refer him to the wound clinic for treatment of this wound.   Treatment of this wound was initiated in clinic on 5/12.  During this visit was also noted that his heel DTI had resolved, but that he had a new pressure injury to his left posterior lower extremity.     A new pressure injury was noted to patient's right upper buttock/lower back on 5/20/2022.  Wound was linear in shape, skin discolored but intact.     Abrasion noted to left anterior lower leg.  First observed in clinic on 7/22/2022.  Patient states he bumped his leg into his food tray.     Small DTI noted to patient's left lateral lower leg on 7/29/2022.  Skin intact but discolored.     Large area of deep tissue injury noted to patient's left exterior lower leg.  Patient denied any trauma to this area.  Skin intact.  Wound documented.    1/27/2023: [place] [facility] [place] with gross hematuria. He underwent RICHARD which showed watchman device was in place and he was taken off of Xarelto. While hospitalized wound team was consulted. He was referred back to [facility] and home health upon discharge.    Patient was hospitalized at [facility] for pyelonephritis from 2/26 until 3/2/2023, admitted for fever and general malaise.  He was admitted and initially started on linezolid and meropenem for suspected  UTI and history of multidrug-resistant organisms.  Urine cultures were negative. ID was consulted, recommended CT of chest and abdomen,which were negative for acute findings. However, he was treated with 5 days total course of antibiotics for suspected UTI, and symptoms completely resolved.  During this admission, the inpatient wound team was consulted for treatment of his sacral and lower leg wounds.  A wound culture was taken from his left heel pressure ulcer, negative.  Once stabilized, he was discharged home and referred back to [facility] to resume treatment of his wounds.    Patient was hospitalized at [facility] from 12/11 until 12/23/2023, admitted for fever.  Wound infection suspected.  CT scan of abdomen and pelvis for evaluation of sacral pressure injury showed gas tracking down to the bone consistent with osteomyelitis.  He underwent I&D of right ischial ulcer (documented as buttock) with Dr. Bansal, medial tract leading to an abscess was identified.  Cavity was opened allowing it to drain into the main wound bed.  Wound VAC was placed and managed by wound team during this admission.  A bone scan of patient's left foot was also done, initially concerning for osteomyelitis.  Orthopedic surgery was consulted and did not recommend surgical intervention. ID consulted also, recommended the patient to receive IV ertapenem 1 g every 24 hours plus IV daptomycin 8 mg/kg every 24 hours through 1/22/2024.   He was discharged to [facility] on 1/23 for IV antibiotics and wound care.  From the LTAC he was discharged home on 1/22 with home health and referral back to [facility] to resume management of his wounds  .      Pertinent Medical History: Incomplete quadriplegia, history of stage IV pressure injuries, history of flap procedures to sacral pressure injuries, osteomyelitis, obesity, colostomy in place   Contributing factors: Immobility and Obesity, impaired sensation    Personal support: Attendant-staff at shelter and home health  nursing    TOBACCO USE:   Former smoker, quit in 1977.  Never used smokeless tobacco    Patient's problem list, allergies, and current medications reviewed and updated in Epic    Interval History:  Interval History thinned 7/29/2022.  Please see previous notes for complete interval history.   Interval History thinned 1/27/2023. Please see previous note for complete interval history.  Interval History thinned 3/3/2023.  Please see previous notes for complete interval history.    Interval History thinned 8/4/2023.  Please see previous notes for complete interval history.    Interval History thinned 1/31/2024.  Please see previous notes for complete interval history.    Interval History thinned 6/26/2024.  Please see previous notes for complete interval history.      6/19/2024: Clinic visit with Steve Hodges MD. Patient denies any fevers or chills. Reports he is tolerating Abx. He has appointment with ID later today to discuss OM treatment. He is tolerating wound VAC. Slight bone exposed bilaterally in ischial wounds, slightly worse.    6/26/2024 : Clinic visit with ADILSON Arthur, FNPEGGY-BC, CWDINESHN, CFTRACI.   Patient presents today with significant deterioration of his both ischial wounds, with increased depth of undermining.   He now has a PICC line, and will be starting outpatient infusions of ertapenem later today.  He states he is feeling well, denies fevers, chills, nausea, vomiting, cough or shortness of breath.  Due to deterioration of his wound, we did discuss possibly having him go over the hospital for surgical debridement and IV antibiotics.  He was hesitant to do so.  We agreed to see how he looks after a week or so IV antibiotics.  He is agreeable to minimizing time up in his wheelchair.  He also agrees to go to the emergency room immediately if he develops any signs or symptoms of infection.    7/10/2024: Clinic visit with Steve Hodges MD. Patient reports feeling in normal state of health. He missed  last appointment as he had fall out of wheelchair and was evaluated in ED. He denies any injuries from fall. Patient wounds are measuring slightly smaller. He continues on Ertapenem. Patient reports that he has appointment for seating evaluation from [facility] scheduled, but does not recall the date.    7/17/2024 : Clinic visit with ADILSON Arthur, HOLDEN, LILIYA VALERIO.   Anjum states he is feeling well.  Left ischial wound measures significantly smaller today, however measurements vary somewhat depending on pt's position.  Both wounds appear to be progressing slightly, with improving tissue quality.    7/24/2024 : Clinic visit with ADILSON Arthur, HOLDEN, IMAN, LILIYA.   Patient continues to feel well, offers no complaints.  Right ischial wound measures smaller, left ischial wound is larger.  Patient tolerating VAC without any difficulty.  Home health continues to see him in between clinic visits.  He is still receiving daily infusions of IV antibiotics, will be completing these in August.    7/31/2024 : Clinic visit with ADILSON Arthur, HOLDEN, IMAN, LILIYA.   Anjum continues to feel well, his wounds are slowly progressing.  Tolerating VAC.  He is on IV antibiotics for 1 more week.  Admits that he is up in his wheelchair for 10 to 14 hours/day.    8/7/2024 : Clinic visit with ADILSON Arthur FNP-BC, LILIYA VALERIO.   Anjum states he is feeling well today, offers no complaints.  Both wounds measure a bit smaller today, new granulation tissue noted.  He will be getting his last IV antibiotic infusion today.    8/14/2024 : Clinic visit with ADILSON Arthur FNP-BC, IMAN, LILIYA.   Patient continues to feel well.  He has completed his antibiotics, followed up with ID earlier this week, no further antibiotic therapy at this time.  Ischial wounds are slowly progressing.  Lower extremity wounds remain resolved.    8/21/2024 : Clinic visit with ADILSON Arthur FNP-BC, LILIYA VALERIO.   Anjum states he is feeling  well, offers no complaints.  His wounds are slowly progressing, increased granulation.    8/28/2024 : Clinic visit with ADILSON Arthur, HOLDEN, LILIYA VALERIO.   Turk states he is feeling well overall.  His wounds measure slightly smaller.  He has had some urinary symptoms, reports leakage from around his suprapubic catheter last night, and excessively full urine bag.  He has been in contact with his urologist office.  He also mentions that he has a recurring small scab to his nose, states that it falls off only to return shortly after.  This has been going on for several months.  I offered to refer him to dermatology.    9/11/2024 : Clinic visit with ADILSON Arthur, HOLDEN, LILIYA VALERIO.   Turk states he is feeling well.  He missed his appointment last week due to car troubles.  His vehicle is now running well.  Ischial wounds show some improvement, increased granulation tissue.  He does have a small reopening of left posterior lower leg wound, appears to be a small abrasion over area of scar tissue.    9/18/2024: Clinic visit with Steve Hodges MD. Patient reports doing ok. Denies any acute issues with wound VAC. Reports that he was fitted by [employer] for new seat cushion and is being manufactured, he is not sure when will be ready. Patient's wounds appears slightly improved. Left posterior leg wound remains open.    9/25/2024 : Clinic visit with ADILSON Arthru, HOLDEN, IMAN, LILIYA.   Patient states he is feeling well.  His wounds measure about the same.    10/2/2024: Clinic visit with HOLDEN Johnson CWON, LILIYA.  Pt denies fevers, chills, nausea, vomiting. Bilateral ischial ulcers increased in area.  Left IT quality overall worse compared to right IT.  Recommend Dakin's soak.  Continue with VAC to bilateral IT.    10/9/2024 : Clinic visit with ADILSON Arthur, HOLDEN, IMAN, LILIYA.   Patient continues to feel well overall.  His wounds measure about the same, no evidence of infection.   He does have some minor breakdown over the scar tissue of his sacrum which bears watching.  He has not heard from [employer] about his seat cushion, states he will contact them again.    10/16/2024 : Clinic visit with ADILSON Arthur, HOLDEN, IMAN, LILIYA.   Anjum continues to feel well.  His wounds measure slightly smaller.  Sacral wound just slightly open.  He called [employer] earlier this week, was told his new cushion is ready, and that they would deliver it next Monday.  He also states he is due for a new wheelchair, but has not yet become process.      10/23/2024 : Clinic visit with ADILSON Arthur, HOLDEN, IMAN, LILIYA.   Anjum's wounds present today with significantly more odor.  He states he is feeling fine, denies fevers, chills, nausea, vomiting, cough or shortness of breath.  Increased drainage noted.  Wounds measure about the same.  Culture collected in clinic today after debridement.  VAC placed on hold.  Wounds packed with Puracyn moistened silver Hydrofiber.   Patient reminded that he is to go to the emergency room if he notices any increased redness, swelling, drainage or odor, or if he develops fever, chills, nausea or vomiting.    He has a new cushion to his wheelchair.  States that he does not yet have a new wheelchair, will be picking on out the next few weeks.    10/30/2024 : Clinic visit with ADILSON Arthur, HOLDEN, IMAN, LILIYA.   Anjum states he is feeling well.  Wound odor still noticeable.  Culture collected last visit was positive for light growth of Proteus.  Given continued odor, will go ahead and prescribe short course of Augmentin, no other p.o. options available based on sensitivities.  Continue with Dakin's wound cleansing.  Home health to restart VAC on Friday 11/6/2024: Clinic visit with Steve Hodges MD. Patient reports doing well, denies any acute issues. Tolerating augmentin well without side effects. Odor much improved today. Wounds are improving slowly. Continue wound  VAC.    11/14/2024: Clinic visit with Steve Hodges MD. Patient reports doing ok. He was frustrated coming into clinic, was having trouble exiting his van. No evidence of wound infection today    11/20/2024: Clinic visit with Steve Hodges MD. Patient reports feeling in normal state of health. His ischial wounds stable and slowly improving. He has reopened sacral wound however. Denies any signs or symptoms of infection.    11/27/2024: Clinic visit with Steve Hodges MD. Patient reports doing well, denies any acute issues. Sacral wound measuring smaller. Right ischial wound smaller. Left ischial wound larger with increased slough and necrotic tissue at base of wound. Patient has contacted [facility] technician to evaluate seat due to reopening of sacrum, he is pending call back.    12/4/2024: Clinic visit with Steve Hodges MD. Patient reports doing well, denies any signs or symptoms of infection. Denies any issues with wound VAC. Sacrum has resolved. Right ischium wound larger with necrotic tissue, left ischium stable. He denies any traumatic events or any changes to his routine that would have increased pressure on right ischium besides time spent in chair during thanksgiving with family.    12/11/2024: Clinic visit with Steve Hodges MD. Patient reports doing well, denies any acute issues. Wounds are stable. No further necrosis of right ischium. Patient denies any signs or symptoms of infection.    12/18/2024: Clinic visit with Steve Hodges MD. Patient reports doing ok. Reports was diagnosed with UTI by urology, picking up Abx later today, thinks that he was prescribed Augmentin. Right ischial wound measuring larger, dusky tissue concerning for increased pressure. He reports that he has been up in wheelchair more this week with friend in town and has not been using tilt feature of chair as frequently. He was encouraged to spend more time offloading.   Due to holiday's, and dressings only going to be  changed 2x weekly, it is medically necessary to continue wound VAC for now as it would be severely problematic for patient to be sitting with saturated dressings during this period.    1/8/2025: Clinic visit with Steve Hodges MD. Patient reports chest congestion with low grade fevers for last few days. Denies any issues with wounds. He has increased necrosis of left IT pressure injury, he denies any changes in activity and is using tilt feature in chair every 30 min. Patient reports that he has appointment with [facility] next Friday to re-evaluate custom seat.    REVIEW OF SYSTEMS:   Unchanged from previous wound clinic assessment on 12/18/2024, except as noted in interval history above.      PHYSICAL EXAMINATION:   /84   Pulse 83   Temp 37.9 °C (100.3 °F) (Temporal)   Resp 20   SpO2 93%   Physical Exam  Constitutional:       Appearance: He is obese.   Cardiovascular:      Rate and Rhythm: Normal rate.   Pulmonary:      Effort: Pulmonary effort is normal.   Abdominal:      Comments: Colostomy left lower quadrant   Genitourinary:     Comments: Suprapubic catheter to down drain   Skin:     Comments: Stage IV pressure injury to right ischium: Right ischial wound stable, not significantly improved.    Stage IV pressure injury of left ischium: Wound larger, evidence of excessive pressure with dusky tissue.    Stage IV pressure injury sacrum: Resolved    All other wounds remain healed, high risk for recurrence     Neurological:      Mental Status: He is alert and oriented to person, place, and time.   Psychiatric:         Mood and Affect: Mood normal.         WOUND ASSESSMENT  Wound 12/12/23 Pressure Injury Ischium Right (Active)   Wound Image    01/08/25 1640   Site Assessment Pink;Red 01/08/25 1640   Periwound Assessment Maceration 01/08/25 1640   Margins Unattached edges 01/08/25 1640   Closure Secondary intention 10/16/24 1700   Drainage Amount Moderate 01/08/25 1640   Drainage Description Serosanguineous  01/08/25 1640   Treatments Cleansed;Provider debridement;Site care 01/08/25 1640   Offloading/DME Other (comment) 01/08/25 1640   Wound Cleansing Hypochlorus Acid 01/08/25 1640   Periwound Protectant Not Applicable 01/08/25 1640   Dressing Status Intact;Old drainage 10/02/24 1500   Dressing Changed Changed 12/04/24 1615   Dressing Cleansing/Solutions Normal Saline 01/08/25 1640   Dressing Options Hydrofera Blue Classic;Other (Comments);Super Absorbent Pad;Offloading Dressing - Sacral 01/08/25 1640   Dressing Change/Treatment Frequency Monday, Wednesday, Friday, and As Needed 11/20/24 1605   Wound Team Following Weekly 10/16/24 1700   WOUND NURSE ONLY - Pressure Injury Stage 4 01/08/25 1640   Wound Length (cm) 4 cm 01/08/25 1640   Wound Width (cm) 1.6 cm 01/08/25 1640   Wound Depth (cm) 1 cm 01/08/25 1640   Wound Surface Area (cm^2) 6.4 cm^2 01/08/25 1640   Wound Volume (cm^3) 6.4 cm^3 01/08/25 1640   Post-Procedure Length (cm) 4 cm 01/08/25 1640   Post-Procedure Width (cm) 1.6 cm 01/08/25 1640   Post-Procedure Depth (cm) 1.1 cm 01/08/25 1640   Post-Procedure Surface Area (cm^2) 6.4 cm^2 01/08/25 1640   Post-Procedure Volume (cm^3) 7.04 cm^3 01/08/25 1640   Wound Healing % 89 01/08/25 1640   Tunneling (cm) 0 cm 01/08/25 1640   Undermining (cm) 2.7 cm 01/08/25 1640   Undermining of Wound, 1st Location From 6 o'clock;To 8 o'clock 01/08/25 1640   Undermining (cm) - 2nd location 0 cm 01/08/25 1640   Undermining of Wound, 2nd Location From 12 o'clock;To 1 o'clock 12/04/24 1615   Wound Odor Foul 01/08/25 1640   Exposed Structures Fascia 01/08/25 1640   Number of days: 395       Wound 05/01/24 Pressure Injury Ischium Left (Active)   Wound Image    01/08/25 1640   Site Assessment Abel;Pink;Yellow 01/08/25 1640   Periwound Assessment Maceration 01/08/25 1640   Margins Unattached edges 01/08/25 1640   Closure Secondary intention 09/18/24 1500   Drainage Amount Moderate 01/08/25 1640   Drainage Description Serosanguineous  01/08/25 1640   Treatments Cleansed;Provider debridement;Site care 01/08/25 1640   Offloading/DME Other (comment) 01/08/25 [facility]   Wound Cleansing Hypochlorus Acid 01/08/25 [facility]   Periwound Protectant Not Applicable 01/08/25 1640   Dressing Changed Changed 12/04/24 1615   Dressing Cleansing/Solutions Normal Saline 01/08/25 1640   Dressing Options Hydrofera Blue Classic;Other (Comments);Super Absorbent Pad;Offloading Dressing - Sacral 01/08/25 1640   Dressing Change/Treatment Frequency Monday, Wednesday, Friday, and As Needed 01/08/25 1640   Wound Team Following Weekly 12/18/24 1700   WOUND NURSE ONLY - Pressure Injury Stage 4 01/08/25 1640   Non-staged Wound Description Not applicable 09/18/24 1600   Wound Length (cm) 4.5 cm 01/08/25 1640   Wound Width (cm) 2.5 cm 01/08/25 1640   Wound Depth (cm) 1.3 cm 01/08/25 1640   Wound Surface Area (cm^2) 11.25 cm^2 01/08/25 1640   Wound Volume (cm^3) 14.625 cm^3 01/08/25 1640   Post-Procedure Length (cm) 4.6 cm 01/08/25 1640   Post-Procedure Width (cm) 2.5 cm 01/08/25 1640   Post-Procedure Depth (cm) 1.3 cm 01/08/25 1640   Post-Procedure Surface Area (cm^2) 11.5 cm^2 01/08/25 1640   Post-Procedure Volume (cm^3) 14.95 cm^3 01/08/25 1640   Wound Healing % -6548 01/08/25 1640   Tunneling (cm) 0 cm 01/08/25 1640   Undermining (cm) 0 cm 01/08/25 1640   Undermining of Wound, 1st Location From 4 o'clock;To 10 o'clock 12/18/24 1700   Wound Odor Foul 01/08/25 1640   Exposed Structures Muscle;Fascia 01/08/25 [facility]   Number of days: 254       Wound 01/09/25 Other (comment) Coccyx Left;Right (Active)   Number of days: 1       Wound 01/09/25 Pressure Injury Sacrum (Active)   Number of days: 1       Wound 01/09/25 Other (comment) Toe, 3rd;Toe, 4th Left (Active)   Number of days: 1       PROCEDURE: Excisional debridement of right and left ischial wounds  -2% viscous lidocaine applied topically to wound bed for approximately 5 minutes prior to debridement  -Curette used to debride wound bed.   "Excisional debridement was performed to remove devitalized tissue until healthy, bleeding tissue was visualized.  Total area debrided was approximately 17.9 cm², including into undermined areas of wounds.  Tissue debrided into the muscle / fascia layer.    -Bleeding controlled with manual pressure.    -Wound care completed by wound RN, refer to flowsheet  -Patient tolerated the procedure well, without c/o pain or discomfort.    Pertinent Labs and Diagnostics:    Labs:     A1c: No results found for: \"HBA1C\"     [facility] results, 7/1/2022 (under media tab)    CRP 13    ESR 31      IMAGING:     X-ray left tib-fib ordered 2/16/2024 through [facility]    12/11/2023-CT of abdomen pelvis with contrast  IMPRESSION:   1.  Right ischial decubitus ulcer extending to bone with soft tissue gas tracking along the right perineum. Appearance suggesting osteomyelitis, consider component of necrotizing fasciitis as clinically appropriate.  2.  Small pericardial effusion  3.  Left adrenal nodule, density on prior noncontrast CT demonstrates adenoma.  4.  Hepatomegaly  5.  Enlarged prostate, workup and evaluation for causes of prostate enlargement recommended as clinically appropriate.  6.  Atherosclerosis and atherosclerotic coronary artery disease    12/15/2023-bone scan of left foot  IMPRESSION:     1.  Mild increased activity in the LEFT 1st and 3rd toes on blood pool and delayed images possibly indicating inflammation/infection.  2.  No significant blood flow asymmetry.          VASCULAR STUDIES: No results found.    LAST  WOUND CULTURE:   Lab Results   Component Value Date/Time    CULTRSULT  01/09/2025 01:27 PM     No Growth  Note: Blood cultures are incubated for 5 days and  are monitored continuously.Positive blood cultures  are called to the RN and reported as soon as  they are identified.        PATHOLOGY  2/17/2023-bone fragment extracted from left lower extremity wound\\  FINAL DIAGNOSIS:     A. Left leg bone " fragment at base of chronic wound:          Extensively degenerated fibrocartilaginous tissue with a rim of           fibrinopurulent debris          Correlate with culture findings            Comment: While no residual intact bone is identified, these           findings are suggestive of adjacent osteomyelitis.      ASSESSMENT AND PLAN:     1. Sacral decubitus ulcer, stage IV ([facility])  Comments: Ulcer first noted in early April 2022 as small open area which quickly enlarged.  This ulcer is present distal from previous sacral ulcer which healed after surgery in January.  Patient has history of flap reconstruction x2 to this area.    1/8/2025: Wound remains resolved  - Continue to protect area with pressure relieving sacral foam dressing.  -Patient does spend a lot of time up in his wheelchair, and wishes to continue to do so for his quality of life.  He lives independently, drives, and is involved with family activities.    -His presents today with a new cushion in his wheelchair.  Has yet to pick out a new wheelchair  - Known OM that was previously treated. CT scan done during recent hospitalization did not show OM of sacrum, however OM of the ischium noted.  -Patient is very well versed in pressure relief strategies    Wound care: silicone adhesive foam dressing    2. Pressure injury of right ischium, stage 4 ([facility])  Comments: Abscess and OM found on CT during hospitalization in December 2023.  Patient underwent I&D with VAC placement.  IV antibiotics through 1/22/2024.    1/8/2025: Wound stable to improved.  - Excisional debridement of nonviable tissue from wound in clinic today, medically necessary to promote wound healing  - Home health  to continue Dakins soaked gauze to wound for approximately 10 minutes with each dressing change  - Hold wound VAC  -Patient to return to clinic weekly for assessment, debridement, and dressing change.    -Home health to change dressing 2 times per week in between clinic visits.     -Patient is very well versed on pressure relief measures, has adequate surface on bed, alternating low air loss.    Wound care: Hydrofera Blue Classic, Enluxtra, Silicone foam    3. Pressure injury of left ischium, stage 4 ([facility])    1/8/2025: Wound has not improved, increased evidence of pressure.  - Excisional debridement of wound in clinic today, medically necessary to promote wound healing.  - Patient to return to clinic weekly for assessment, debridement, and dressing change  - Hold VAC  -Home health to change dressing 2 times per week in between clinic visits.    -Patient has new cushion in his wheelchair, see above  -Patient is very well versed on pressure relief measures, has adequate surface on bed, alternating low air loss.    Wound care: Hydrofera Blue Classic, Enluxtra, Silicone foam    4. Other acute osteomyelitis, other site ([facility])    1/8/2025: Bone fragments removed during clinic visit in June were sent for pathology, polymicrobial, most concerning was an MDR ESBL Proteus.   -Patient completed IV antibiotics.  No further antibiotics at this time per ID  -Patient understands he has a very low threshold for infection/sepsis.  He understands he is to go to the emergency room immediately if he begins to experience fevers, chills, nausea or vomiting, or if he notices radiating erythema or purulent drainage from his wounds.    5. Postoperative wound dehiscence, subsequent encounter  6. Osteomyelitis of left lower extremity ([facility])  Comments: On 4/26/2022 patient underwent irrigation and debridement of multiple compartments of the left lower extremity states for pressure injury, with bone excision, and complex closure of chronic wound using biologic skin substitute.  During postop visit in surgeons office on 5/11, surgical site was noted to be dehisced.      1/8/2025: Skin remains intact  - Wound waxes and wanes due to pressure and underlying known chronic OM  -Patient to be mindful of offloading when supine,  should assure that his leg is not rotating medially  -Monitor site each clinic visit  Wound care: Silicone foam dressing, Tubigrip D    7. Pressure injury of left foot, stage 4 ([facility])  8. Pressure injury of left leg, stage 3 ([facility])  9. Pressure injury of left heel, stage 3 ([facility])    1/8/2025: All of these wounds remain healed  -Monitor pressure points each clinic visit  - Patient aware of offloading.    10. Quadriplegia, C5-C7 incomplete ([facility])  Comments: Complicating factor.  Impaired mobility and sensation  -Patient is still spending 7-8 hours/day up in his wheelchair and knows to reposition frequently.  Wears heel float boots bilaterally at all times  - Patient reports that he has seating evaluation with [employer] upcoming.    Please note that this note may have been created using voice recognition software. I have worked with technical experts from [employer] to optimize the interface.  I have made every reasonable attempt to correct obvious errors, but there may be errors of grammar and possibly content that I did not discover before finalizing the note.   show

## 2025-01-10 PROBLEM — J16.0 COMMUNITY ACQUIRED PNEUMONIA DUE TO CHLAMYDIA SPECIES: Status: ACTIVE | Noted: 2025-01-10

## 2025-01-10 PROBLEM — J15.7 PNEUMONIA OF RIGHT UPPER LOBE DUE TO MYCOPLASMA PNEUMONIAE: Status: ACTIVE | Noted: 2025-01-10

## 2025-01-10 PROBLEM — J18.9 PNEUMONIA: Status: ACTIVE | Noted: 2025-01-10

## 2025-01-10 LAB
ANION GAP SERPL CALC-SCNC: 9 MMOL/L (ref 7–16)
B PARAP IS1001 DNA NPH QL NAA+NON-PROBE: NOT DETECTED
B PERT.PT PRMT NPH QL NAA+NON-PROBE: NOT DETECTED
BACTERIA UR CULT: NORMAL
BUN SERPL-MCNC: 11 MG/DL (ref 8–22)
C PNEUM DNA NPH QL NAA+NON-PROBE: NOT DETECTED
CALCIUM SERPL-MCNC: 8.5 MG/DL (ref 8.4–10.2)
CHLORIDE SERPL-SCNC: 104 MMOL/L (ref 96–112)
CO2 SERPL-SCNC: 23 MMOL/L (ref 20–33)
CREAT SERPL-MCNC: 0.55 MG/DL (ref 0.5–1.4)
ERYTHROCYTE [DISTWIDTH] IN BLOOD BY AUTOMATED COUNT: 56.5 FL (ref 35.9–50)
FLUAV RNA NPH QL NAA+NON-PROBE: NOT DETECTED
FLUBV RNA NPH QL NAA+NON-PROBE: NOT DETECTED
GFR SERPLBLD CREATININE-BSD FMLA CKD-EPI: 104 ML/MIN/1.73 M 2
GLUCOSE SERPL-MCNC: 101 MG/DL (ref 65–99)
GRAM STN SPEC: NORMAL
GRAM STN SPEC: NORMAL
HADV DNA NPH QL NAA+NON-PROBE: NOT DETECTED
HCOV 229E RNA NPH QL NAA+NON-PROBE: NOT DETECTED
HCOV HKU1 RNA NPH QL NAA+NON-PROBE: NOT DETECTED
HCOV NL63 RNA NPH QL NAA+NON-PROBE: NOT DETECTED
HCOV OC43 RNA NPH QL NAA+NON-PROBE: NOT DETECTED
HCT VFR BLD AUTO: 35.8 % (ref 42–52)
HGB BLD-MCNC: 11.9 G/DL (ref 14–18)
HMPV RNA NPH QL NAA+NON-PROBE: NOT DETECTED
HPIV1 RNA NPH QL NAA+NON-PROBE: NOT DETECTED
HPIV2 RNA NPH QL NAA+NON-PROBE: NOT DETECTED
HPIV3 RNA NPH QL NAA+NON-PROBE: NOT DETECTED
HPIV4 RNA NPH QL NAA+NON-PROBE: NOT DETECTED
LACTATE SERPL-SCNC: 0.6 MMOL/L (ref 0.5–2)
LACTATE SERPL-SCNC: 0.8 MMOL/L (ref 0.5–2)
M PNEUMO DNA NPH QL NAA+NON-PROBE: DETECTED
MCH RBC QN AUTO: 33 PG (ref 27–33)
MCHC RBC AUTO-ENTMCNC: 33.2 G/DL (ref 32.3–36.5)
MCV RBC AUTO: 99.2 FL (ref 81.4–97.8)
PLATELET # BLD AUTO: 145 K/UL (ref 164–446)
PMV BLD AUTO: 9 FL (ref 9–12.9)
POTASSIUM SERPL-SCNC: 3.8 MMOL/L (ref 3.6–5.5)
RBC # BLD AUTO: 3.61 M/UL (ref 4.7–6.1)
RSV RNA NPH QL NAA+NON-PROBE: NOT DETECTED
RV+EV RNA NPH QL NAA+NON-PROBE: NOT DETECTED
SARS-COV-2 RNA NPH QL NAA+NON-PROBE: NOTDETECTED
SIGNIFICANT IND 70042: NORMAL
SITE SITE: NORMAL
SODIUM SERPL-SCNC: 136 MMOL/L (ref 135–145)
SOURCE SOURCE: NORMAL
TROPONIN T SERPL-MCNC: 63 NG/L (ref 6–19)
TROPONIN T SERPL-MCNC: 66 NG/L (ref 6–19)
WBC # BLD AUTO: 5.8 K/UL (ref 4.8–10.8)

## 2025-01-10 PROCEDURE — 84484 ASSAY OF TROPONIN QUANT: CPT

## 2025-01-10 PROCEDURE — 770020 HCHG ROOM/CARE - TELE (206)

## 2025-01-10 PROCEDURE — 700102 HCHG RX REV CODE 250 W/ 637 OVERRIDE(OP): Performed by: HOSPITALIST

## 2025-01-10 PROCEDURE — 85027 COMPLETE CBC AUTOMATED: CPT

## 2025-01-10 PROCEDURE — 700105 HCHG RX REV CODE 258: Performed by: HOSPITALIST

## 2025-01-10 PROCEDURE — 87070 CULTURE OTHR SPECIMN AEROBIC: CPT

## 2025-01-10 PROCEDURE — 80048 BASIC METABOLIC PNL TOTAL CA: CPT

## 2025-01-10 PROCEDURE — 99233 SBSQ HOSP IP/OBS HIGH 50: CPT | Performed by: HOSPITALIST

## 2025-01-10 PROCEDURE — 94760 N-INVAS EAR/PLS OXIMETRY 1: CPT

## 2025-01-10 PROCEDURE — 83605 ASSAY OF LACTIC ACID: CPT

## 2025-01-10 PROCEDURE — 87205 SMEAR GRAM STAIN: CPT

## 2025-01-10 PROCEDURE — 700111 HCHG RX REV CODE 636 W/ 250 OVERRIDE (IP): Mod: JZ | Performed by: HOSPITALIST

## 2025-01-10 PROCEDURE — A9270 NON-COVERED ITEM OR SERVICE: HCPCS | Performed by: HOSPITALIST

## 2025-01-10 PROCEDURE — 96365 THER/PROPH/DIAG IV INF INIT: CPT

## 2025-01-10 PROCEDURE — 36415 COLL VENOUS BLD VENIPUNCTURE: CPT

## 2025-01-10 PROCEDURE — 96372 THER/PROPH/DIAG INJ SC/IM: CPT

## 2025-01-10 PROCEDURE — 94669 MECHANICAL CHEST WALL OSCILL: CPT

## 2025-01-10 RX ORDER — FUROSEMIDE 10 MG/ML
20 INJECTION INTRAMUSCULAR; INTRAVENOUS
Status: DISCONTINUED | OUTPATIENT
Start: 2025-01-10 | End: 2025-01-11 | Stop reason: HOSPADM

## 2025-01-10 RX ORDER — SODIUM HYPOCHLORITE 1.25 MG/ML
SOLUTION TOPICAL 2 TIMES DAILY
Status: DISCONTINUED | OUTPATIENT
Start: 2025-01-10 | End: 2025-01-11 | Stop reason: HOSPADM

## 2025-01-10 RX ADMIN — DOCUSATE SODIUM 200 MG: 100 CAPSULE, LIQUID FILLED ORAL at 05:29

## 2025-01-10 RX ADMIN — SENNOSIDES AND DOCUSATE SODIUM 2 TABLET: 50; 8.6 TABLET ORAL at 16:52

## 2025-01-10 RX ADMIN — HEPARIN SODIUM 5000 UNITS: 5000 INJECTION, SOLUTION INTRAVENOUS; SUBCUTANEOUS at 13:38

## 2025-01-10 RX ADMIN — DOCUSATE SODIUM 200 MG: 100 CAPSULE, LIQUID FILLED ORAL at 16:52

## 2025-01-10 RX ADMIN — CEFTRIAXONE SODIUM 2000 MG: 2 INJECTION, POWDER, FOR SOLUTION INTRAMUSCULAR; INTRAVENOUS at 05:36

## 2025-01-10 RX ADMIN — BACLOFEN 20 MG: 10 TABLET ORAL at 09:29

## 2025-01-10 RX ADMIN — BACLOFEN 20 MG: 10 TABLET ORAL at 16:52

## 2025-01-10 RX ADMIN — FUROSEMIDE 20 MG: 10 INJECTION, SOLUTION INTRAVENOUS at 16:52

## 2025-01-10 RX ADMIN — Medication 10 MG: at 20:37

## 2025-01-10 RX ADMIN — HEPARIN SODIUM 5000 UNITS: 5000 INJECTION, SOLUTION INTRAVENOUS; SUBCUTANEOUS at 21:43

## 2025-01-10 RX ADMIN — ASPIRIN 81 MG: 81 TABLET, COATED ORAL at 05:30

## 2025-01-10 RX ADMIN — COLCHICINE 0.6 MG: 0.6 TABLET, FILM COATED ORAL at 05:29

## 2025-01-10 RX ADMIN — POLYETHYLENE GLYCOL 3350 1 PACKET: 17 POWDER, FOR SOLUTION ORAL at 20:41

## 2025-01-10 RX ADMIN — BACLOFEN 20 MG: 10 TABLET ORAL at 20:37

## 2025-01-10 RX ADMIN — AZITHROMYCIN DIHYDRATE 500 MG: 250 TABLET ORAL at 05:29

## 2025-01-10 RX ADMIN — BACLOFEN 20 MG: 10 TABLET ORAL at 13:37

## 2025-01-10 RX ADMIN — HEPARIN SODIUM 5000 UNITS: 5000 INJECTION, SOLUTION INTRAVENOUS; SUBCUTANEOUS at 05:30

## 2025-01-10 RX ADMIN — VIBEGRON 75 MG: 75 TABLET, FILM COATED ORAL at 05:29

## 2025-01-10 RX ADMIN — OXYBUTYNIN CHLORIDE 5 MG: 5 TABLET, EXTENDED RELEASE ORAL at 16:52

## 2025-01-10 ASSESSMENT — ENCOUNTER SYMPTOMS
VOMITING: 0
CHILLS: 0
FEVER: 0
SHORTNESS OF BREATH: 1
BRUISES/BLEEDS EASILY: 0
EYE REDNESS: 0
EYE DISCHARGE: 0
SPUTUM PRODUCTION: 1
NERVOUS/ANXIOUS: 0
FOCAL WEAKNESS: 0
COUGH: 1
ABDOMINAL PAIN: 0
STRIDOR: 0
FLANK PAIN: 0
MYALGIAS: 0

## 2025-01-10 ASSESSMENT — PAIN DESCRIPTION - PAIN TYPE
TYPE: ACUTE PAIN
TYPE: ACUTE PAIN

## 2025-01-10 ASSESSMENT — FIBROSIS 4 INDEX: FIB4 SCORE: 2.08

## 2025-01-10 NOTE — PROGRESS NOTES
4 Eyes Skin Assessment Completed by ZAIRA Rodriguez and ZAIRA Herrera.    Head WDL  Ears WDL  Nose Redness and Non-Blanching    Mouth WDL  Neck WDL  Breast/Chest WDL  Shoulder Blades WDL  Spine WDL  (R) Arm/Elbow/Hand WDL  (L) Arm/Elbow/Hand WDL  Abdomen  Spurpubic cath, LLQ colostomy  Groin WDL  Scrotum/Coccyx/Buttocks Mepilex Dressing on sacrum, 2 Mepilex dressing on both ischium, pt refused to be removed  (R) Leg Scab  (L) Leg Scab, Bruising        (R) Heel/Foot/Toe Boggy  (L) Heel/Foot/Toe Redness, bruising              Devices In Places Ortho Boot/Shoe and NANCY's, Suprapubic cath, colostomy      Interventions In Place Sacral Mepilex, TAP System, Q2 Turns, and Low Air Loss Mattress    Possible Skin Injury Yes    Pictures Uploaded Into Epic Yes  Wound Consult Placed Yes  RN Wound Prevention Protocol Ordered Yes

## 2025-01-10 NOTE — ASSESSMENT & PLAN NOTE
Influenza, RSV and COVID-19 is negative  Ceftriaxone, and azithromycin  Oxygen as needed, Respiratory protocol, Bronchodilators, Incentive spirometry   The patient has a lot of secretion.  Will benefit from PAP therapy, Acapella flutter device  I will request speech therapy consult to evaluate swallowing and rule out aspiration

## 2025-01-10 NOTE — CARE PLAN
The patient is Stable - Low risk of patient condition declining or worsening    Shift Goals  Clinical Goals: skin check, repiratory panel, sputum culture  Patient Goals: sleep at least 8 hours    Progress made toward(s) clinical / shift goals:  Pt refused to check his wound during this shift. He said he will wait for wound team tomorrow, last dressing done on 1/8/25 at 4PM per pt. Respiratory panel and sputum culture samples sent, in process. Latest troponin level was relayed to Dr Gonzalez during her rounds. RN wound protocol for pressure injury initiated during this shift, pt tolerated q 2 turns. Intentional rounding and plan of care in progress.     Patient is not progressing towards the following goals:N/A

## 2025-01-10 NOTE — DIETARY
Nutrition Services: Update   Day 0 of admit.  Osvaldo Pate is a 74 y.o. male with admitting DX of Chest pain [R07.9]    Report of wound on nutrition screen. Per flow sheets, pt has a wound on his coccyx and on his 3rd and 4th toes on his left foot, and a pressure injury on his sacrum. WT consult pending.     Pt is currently on a regular diet. PO intake 25-50% of dinner last night and % of breakfast this morning.     Wt 1/10/25: 107 kg via bed scale - BMI=33.85 (class 1 obesity).    No report of wt loss or poor PO PTA per nutrition screen.     RD will follow for completion of WT assessment and provide nutrition intervention PRN.

## 2025-01-10 NOTE — DISCHARGE PLANNING
Case Management Discharge Planning    Admission Date: 1/9/2025  GMLOS:    ALOS: 0    6-Clicks ADL Score: 14  6-Clicks Mobility Score: 9  PT and/or OT Eval ordered: No  Post-acute Referrals Ordered: NA  Post-acute Choice Obtained: NA  Has referral(s) been sent to post-acute provider:  NA      Anticipated Discharge Dispo: Discharge Disposition: D/T to home under HHA care in anticipation of covered skilled care (06)  Discharge Address: [address] [place] [id number]    DME Needed: No    Action(s) Taken:     Spoke to pt at bedside. Confirmed address and PCP listed on facesheet. Pt lives at [facility] and is on service with [facility]. Pt currently Obs status, therefore no HH order required for resumption of care at this time. Pt confirmed he will transport himself back to  as he has vehicle parked at hospital that is accommodated for his power WC.    Escalations Completed: None    Medically Clear: No    Next Steps: Follow-up with medical team to discuss DC needs and barriers.    Barriers to Discharge: Medical clearance        Care Transition Team Assessment    Information Source  Orientation Level: Oriented X4  Information Given By: Patient  Informant's Name: Anjum  Who is responsible for making decisions for patient? : Patient    Readmission Evaluation  Is this a readmission?: No    Elopement Risk  Legal Hold: No  Ambulatory or Self Mobile in Wheelchair: No-Not an Elopement Risk  Elopement Risk: Not at Risk for Elopement    Interdisciplinary Discharge Planning  Lives with - Patient's Self Care Capacity: Other (Comments)  Patient or legal guardian wants to designate a caregiver: Yes  Support Systems: Children, Other (Comments)  Housing / Facility: [facility]  Name of Care Facility: [facility]    Discharge Preparedness  What is your plan after discharge?: [facility]  What are your discharge supports?: Child    Finances  Financial Barriers to Discharge: No  Prescription Coverage: Yes    Vision / Hearing  Impairment  Vision Impairment : Yes  Right Eye Vision: Wears Glasses  Left Eye Vision: Wears Glasses  Hearing Impairment : No    Advance Directive  Advance Directive?: POLST    Domestic Abuse  Have you ever been the victim of abuse or violence?: No  Possible Abuse/Neglect Reported to:: Not Applicable    Discharge Risks or Barriers  Discharge risks or barriers?: No  Patient risk factors: Vulnerable adult    Anticipated Discharge Information  Discharge Disposition: D/T to home under HHA care in anticipation of covered skilled care (06)  Discharge Address: [address] [place] [address] [place] [id number]
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
## 2025-01-10 NOTE — CARE PLAN
The patient is Stable - Low risk of patient condition declining or worsening    Shift Goals  Clinical Goals: sputum culture recheck, monitor labs, monitor vitals  Patient Goals: rest    Progress made toward(s) clinical / shift goals:      Problem: Knowledge Deficit - Standard  Goal: Patient and family/care givers will demonstrate understanding of plan of care, disease process/condition, diagnostic tests and medications  Note: 1. Patient and family/caregiver oriented to unit, equipment, visitation policy and means for communicating concern 2. Complete/review Learning Assessment 3. Assess knowledge level of disease process/condition, treatment plan, diagnostic tests and medications 4. Explain disease process/condition, treatment plan, diagnostic tests and medications      Problem: Skin Integrity  Goal: Skin integrity is maintained or improved  Note: 1. Assess and monitor skin integrity, appearance and/or temperature 2. Assess risk factors for impaired skin integrity and/or pressures ulcers 3. Implement precautions to protect skin integrity in collaboration with interdisciplinary team 4. Implement pressure ulcer prevention protocol if at risk for skin breakdown 5. Confirm wound care consult if at risk for skin breakdown 6. Ensure patient use of pressure relieving devices (Low air loss bed, waffle overlay, heel protectors, ROHO cushion, etc)      Problem: Respiratory  Goal: Patient will achieve/maintain optimum respiratory ventilation and gas exchange  Note: 1. Assess and monitor rate, rhythm, depth and effort of respiration 2. Breath sounds assessed qshift and/or as needed 3. Assess O2 saturation, administer/titrate oxygen as ordered 4. Position patient for maximum ventilatory efficiency 5. Turn, cough, and deep breath with splinting to improve effectiveness 6. Collaborate with RT to administer medication/treatments per order 7. Encourage use of incentive spirometer and encourage patient to cough after use and utilize  splinting techniques if applicable 8. Airway suctioning 9. Monitor sputum production for changes in color, consistency and frequency 10. Perform frequent oral hygiene 11. Alternate physical activity with rest periods        Patient is not progressing towards the following goals:

## 2025-01-10 NOTE — PROGRESS NOTES
Telemetry summary    Rhythm: SB/SR with 1st degree HB and BBB  Rate: 51-78  Ectopy: 0  Measurements: 0.24/0.14/0.42    Normal Values  Rhythm: SR  HR Range:   Measurement: 0.12-0.2/0.06-0.10/0.30-0.52

## 2025-01-11 ENCOUNTER — PHARMACY VISIT (OUTPATIENT)
Dept: PHARMACY | Facility: MEDICAL CENTER | Age: 75
End: 2025-01-11
Payer: COMMERCIAL

## 2025-01-11 VITALS
BODY MASS INDEX: 33.77 KG/M2 | WEIGHT: 235.89 LBS | DIASTOLIC BLOOD PRESSURE: 80 MMHG | OXYGEN SATURATION: 91 % | HEART RATE: 65 BPM | HEIGHT: 70 IN | TEMPERATURE: 97.9 F | SYSTOLIC BLOOD PRESSURE: 153 MMHG | RESPIRATION RATE: 18 BRPM

## 2025-01-11 PROBLEM — I50.32 CHRONIC DIASTOLIC HEART FAILURE (HCC): Status: ACTIVE | Noted: 2025-01-11

## 2025-01-11 LAB
ALBUMIN SERPL BCP-MCNC: 3.2 G/DL (ref 3.2–4.9)
ALBUMIN/GLOB SERPL: 0.9 G/DL
ALP SERPL-CCNC: 69 U/L (ref 30–99)
ALT SERPL-CCNC: <5 U/L (ref 2–50)
ANION GAP SERPL CALC-SCNC: 13 MMOL/L (ref 7–16)
AST SERPL-CCNC: 10 U/L (ref 12–45)
BASOPHILS # BLD AUTO: 0.4 % (ref 0–1.8)
BASOPHILS # BLD: 0.02 K/UL (ref 0–0.12)
BILIRUB SERPL-MCNC: 0.3 MG/DL (ref 0.1–1.5)
BUN SERPL-MCNC: 13 MG/DL (ref 8–22)
CALCIUM ALBUM COR SERPL-MCNC: 9.3 MG/DL (ref 8.5–10.5)
CALCIUM SERPL-MCNC: 8.7 MG/DL (ref 8.4–10.2)
CHLORIDE SERPL-SCNC: 102 MMOL/L (ref 96–112)
CO2 SERPL-SCNC: 21 MMOL/L (ref 20–33)
CREAT SERPL-MCNC: 0.45 MG/DL (ref 0.5–1.4)
EOSINOPHIL # BLD AUTO: 0.26 K/UL (ref 0–0.51)
EOSINOPHIL NFR BLD: 4.9 % (ref 0–6.9)
ERYTHROCYTE [DISTWIDTH] IN BLOOD BY AUTOMATED COUNT: 58.4 FL (ref 35.9–50)
GFR SERPLBLD CREATININE-BSD FMLA CKD-EPI: 110 ML/MIN/1.73 M 2
GLOBULIN SER CALC-MCNC: 3.7 G/DL (ref 1.9–3.5)
GLUCOSE SERPL-MCNC: 95 MG/DL (ref 65–99)
HCT VFR BLD AUTO: 38 % (ref 42–52)
HGB BLD-MCNC: 12.3 G/DL (ref 14–18)
IMM GRANULOCYTES # BLD AUTO: 0.01 K/UL (ref 0–0.11)
IMM GRANULOCYTES NFR BLD AUTO: 0.2 % (ref 0–0.9)
LYMPHOCYTES # BLD AUTO: 1.32 K/UL (ref 1–4.8)
LYMPHOCYTES NFR BLD: 24.7 % (ref 22–41)
MAGNESIUM SERPL-MCNC: 2.2 MG/DL (ref 1.5–2.5)
MCH RBC QN AUTO: 32.7 PG (ref 27–33)
MCHC RBC AUTO-ENTMCNC: 32.4 G/DL (ref 32.3–36.5)
MCV RBC AUTO: 101.1 FL (ref 81.4–97.8)
MONOCYTES # BLD AUTO: 0.81 K/UL (ref 0–0.85)
MONOCYTES NFR BLD AUTO: 15.2 % (ref 0–13.4)
NEUTROPHILS # BLD AUTO: 2.92 K/UL (ref 1.82–7.42)
NEUTROPHILS NFR BLD: 54.6 % (ref 44–72)
NRBC # BLD AUTO: 0 K/UL
NRBC BLD-RTO: 0 /100 WBC (ref 0–0.2)
NT-PROBNP SERPL IA-MCNC: 356 PG/ML (ref 0–125)
PHOSPHATE SERPL-MCNC: 3.8 MG/DL (ref 2.5–4.5)
PLATELET # BLD AUTO: 149 K/UL (ref 164–446)
PMV BLD AUTO: 9.1 FL (ref 9–12.9)
POTASSIUM SERPL-SCNC: 3.7 MMOL/L (ref 3.6–5.5)
PROCALCITONIN SERPL-MCNC: 0.08 NG/ML
PROT SERPL-MCNC: 6.9 G/DL (ref 6–8.2)
RBC # BLD AUTO: 3.76 M/UL (ref 4.7–6.1)
SODIUM SERPL-SCNC: 136 MMOL/L (ref 135–145)
WBC # BLD AUTO: 5.3 K/UL (ref 4.8–10.8)

## 2025-01-11 PROCEDURE — 94669 MECHANICAL CHEST WALL OSCILL: CPT

## 2025-01-11 PROCEDURE — 84145 PROCALCITONIN (PCT): CPT

## 2025-01-11 PROCEDURE — 700102 HCHG RX REV CODE 250 W/ 637 OVERRIDE(OP): Performed by: HOSPITALIST

## 2025-01-11 PROCEDURE — A9270 NON-COVERED ITEM OR SERVICE: HCPCS | Performed by: HOSPITALIST

## 2025-01-11 PROCEDURE — 85025 COMPLETE CBC W/AUTO DIFF WBC: CPT

## 2025-01-11 PROCEDURE — 700111 HCHG RX REV CODE 636 W/ 250 OVERRIDE (IP): Mod: JZ | Performed by: HOSPITALIST

## 2025-01-11 PROCEDURE — 80053 COMPREHEN METABOLIC PANEL: CPT

## 2025-01-11 PROCEDURE — 83735 ASSAY OF MAGNESIUM: CPT

## 2025-01-11 PROCEDURE — RXMED WILLOW AMBULATORY MEDICATION CHARGE: Performed by: HOSPITALIST

## 2025-01-11 PROCEDURE — 83880 ASSAY OF NATRIURETIC PEPTIDE: CPT

## 2025-01-11 PROCEDURE — 700111 HCHG RX REV CODE 636 W/ 250 OVERRIDE (IP): Performed by: HOSPITALIST

## 2025-01-11 PROCEDURE — 84100 ASSAY OF PHOSPHORUS: CPT

## 2025-01-11 PROCEDURE — 36415 COLL VENOUS BLD VENIPUNCTURE: CPT

## 2025-01-11 PROCEDURE — 99239 HOSP IP/OBS DSCHRG MGMT >30: CPT | Performed by: HOSPITALIST

## 2025-01-11 PROCEDURE — 94760 N-INVAS EAR/PLS OXIMETRY 1: CPT

## 2025-01-11 RX ORDER — FUROSEMIDE 20 MG/1
20 TABLET ORAL DAILY
Qty: 30 TABLET | Refills: 0 | Status: SHIPPED | OUTPATIENT
Start: 2025-01-11 | End: 2025-02-10

## 2025-01-11 RX ORDER — AMLODIPINE BESYLATE 5 MG/1
5 TABLET ORAL
Status: DISCONTINUED | OUTPATIENT
Start: 2025-01-11 | End: 2025-01-11 | Stop reason: HOSPADM

## 2025-01-11 RX ADMIN — ASPIRIN 81 MG: 81 TABLET, COATED ORAL at 04:45

## 2025-01-11 RX ADMIN — BACLOFEN 20 MG: 10 TABLET ORAL at 14:00

## 2025-01-11 RX ADMIN — HEPARIN SODIUM 5000 UNITS: 5000 INJECTION, SOLUTION INTRAVENOUS; SUBCUTANEOUS at 04:46

## 2025-01-11 RX ADMIN — AMLODIPINE BESYLATE 5 MG: 5 TABLET ORAL at 09:08

## 2025-01-11 RX ADMIN — AZITHROMYCIN DIHYDRATE 500 MG: 250 TABLET ORAL at 04:45

## 2025-01-11 RX ADMIN — DOCUSATE SODIUM 200 MG: 100 CAPSULE, LIQUID FILLED ORAL at 04:45

## 2025-01-11 RX ADMIN — BACLOFEN 20 MG: 10 TABLET ORAL at 08:34

## 2025-01-11 RX ADMIN — FUROSEMIDE 20 MG: 10 INJECTION, SOLUTION INTRAVENOUS at 04:46

## 2025-01-11 RX ADMIN — VIBEGRON 75 MG: 75 TABLET, FILM COATED ORAL at 04:46

## 2025-01-11 NOTE — FLOWSHEET NOTE
01/11/25 1100   Events/Summary/Plan   Events/Summary/Plan PEP therapy given   Vital Signs   Pulse 60   Respiration 18   Pulse Oximetry 90 %   $ Pulse Oximetry (Spot Check) Yes   Respiratory Assessment   Level of Consciousness Alert   Chest Exam   Work Of Breathing / Effort Within Normal Limits   Breath Sounds   RUL Breath Sounds Rhonchi   RML Breath Sounds Rhonchi   RLL Breath Sounds Diminished   ADDIE Breath Sounds Rhonchi   LLL Breath Sounds Diminished   Secretions   Cough Congested;Moist;Productive   How Sputum Obtained Spontaneous Cough   Sputum Amount Unable to Evaluate   Sputum Color Unable to Evaluate   Oxygen   O2 (LPM) 0   O2 Delivery Device None - Room Air

## 2025-01-11 NOTE — PROGRESS NOTES
Discharge instructions & prescriptions reviewed with patient. Assisted pt with getting dressed. Changed wound care dressings on bilateral hips and sacrum per wound care order. Assisted pt back to his electric wheelchair using brigid lift. Pt to drive himself back to his group home. CNA assisted out to his car with all belongings.

## 2025-01-11 NOTE — DISCHARGE SUMMARY
Discharge Summary    CHIEF COMPLAINT ON ADMISSION  Chief Complaint   Patient presents with    Cough     Persistent the past few weeks.      Reason for Admission  Cough; Other     Admission Date  1/9/2025    CODE STATUS  Full Code    HPI & HOSPITAL COURSE  Osvaldo Pate is a 74 y.o. male with a past medical history of quadriplegia, neurogenic bladder with chronic Cazares catheter, paroxysmal atrial fibrillation s/p Watchman device, not currently on anticoagulation admitted 1/9/2025 with cough, sputum production, and shortness of breath.  Patient was found to have mycoplasma pneumonia.  He has been treated with azithromycin.  Symptoms improved.  He was saturating on room air on discharge.  He has been chest pain-free since admission.  There were some concerns for possible pericardial effusion on initial imaging and the patient was started on colchicine.  However, echocardiography did not show evidence for pericardial effusion.  Colchicine was discontinued.    Therefore, he is discharged in fair and stable condition to home with close outpatient follow-up.    Discharge Date  1/11/2025     DISCHARGE DIAGNOSES  Principal Problem:    Pneumonia of right upper lobe due to Mycoplasma pneumoniae (POA: Yes)  Active Problems:    Paroxysmal atrial flutter (HCC) (POA: Yes)    Quadriplegia, C5-C7 complete (HCC) (Chronic) (POA: Yes)    Hypertension (POA: Yes)    Suprapubic catheter (HCC) (POA: Yes)    Pressure ulcers of skin of multiple topographic sites (POA: Yes)    Neurogenic bladder (POA: Yes)    Prolonged QT interval (POA: Yes)    Sacral ulcer (HCC) (POA: Yes)    Presence of colostomy (HCC) (POA: Yes)    Pressure ulcer of left ankle, stage 4 (HCC) (POA: Yes)    Obesity (POA: Yes)    Presence of Watchman closure device- implanted 11/22/22 Dr Merino  (POA: Yes)    Community acquired pneumonia due to Chlamydia species (POA: Yes)    Chronic diastolic heart failure (HCC) (POA: Yes)  Resolved Problems:    Abnormal imaging  concerning for possible pericardial effusion (POA: Yes)    Chest pain (POA: Yes)    FOLLOW UP  Future Appointments   Date Time Provider Department Center   1/15/2025 11:30 AM KATE Pennington [address] [place] [address]   1/15/2025  3:00 PM Steve Hodges M.D. [address]   1/22/2025  3:00 PM Steve Hodges M.D. [address]   1/29/2025  3:00 PM Steve Hodges M.D. [address]   2/5/2025  3:00 PM Steve Hodges M.D. [address]   2/12/2025  3:00 PM Steve Hodges M.D. [address]     KATE Phililps  [address] [facility] [address]  [phone]    Follow up in 3 day(s)  Follow-up with renal function, electrolytes, no start diuretics    MEDICATIONS ON DISCHARGE     Medication List        START taking these medications        Instructions   furosemide 20 MG Tabs  Commonly known as: Lasix   Take 1 Tablet by mouth every day for 30 days.  Dose: 20 mg            CHANGE how you take these medications        Instructions   dakins 0.125% (1/4 strength) 0.125 % Soln  What changed:   how to take this  when to take this   Doctor's comments: Pharmacy to dispense what ever size bottle they have available  Dampen gauze with product and apply to wound bed, allow soak for 10 minutes prior to applying wound VAC. Use 3x weekly with VAC changes.            CONTINUE taking these medications        Instructions   acetaminophen 500 MG Tabs  Commonly known as: Tylenol   Take 1,000 mg by mouth every four hours as needed for Moderate Pain.  Dose: 1,000 mg     * amLODIPine 2.5 MG Tabs  Commonly known as: Norvasc   Take 2.5 mg by mouth 1 time a day as needed. If  MMHG and Above  Dose: 2.5 mg     * amLODIPine 5 MG Tabs  Commonly known as: Norvasc   Doctor's comments: PLEASE NOTE ON RX BOTTLE: Resident to self assess blood pressure daily & HOLD if blood pressure <130/80.  Take 1 Tablet by mouth as needed (Per MAR if blood pressure if less than 130/80).  Dose: 5 mg     aspirin 81 MG EC tablet   Take 1 Tablet by  mouth every day.  Dose: 81 mg     baclofen 20 MG tablet  Commonly known as: Lioresal   TAKE 1 TABLET BY MOUTH 4 TIMES DAILY  Dose: 20 mg     diclofenac sodium 1 % Gel  Commonly known as: Voltaren   Apply 1 g topically 4 times a day. Apply to both elbows  Dose: 1 g     docusate sodium 100 MG Caps  Commonly known as: Colace   Take 2 Capsules by mouth 2 times a day.  Dose: 200 mg     ferrous sulfate 325 (65 Fe) MG tablet   Take 1 Tablet by mouth 2 times a day.  Dose: 325 mg     GAS-X PO   Take 1 Tablet by mouth as needed (For gas).  Dose: 1 Tablet     losartan 50 MG Tabs  Commonly known as: Cozaar   Doctor's comments: PLEASE NOTE ON RX BOTTLE: Resident to self assess blood pressure daily & HOLD if blood pressure <130/80.  Take 1 Tablet by mouth every day.  Dose: 50 mg     Magnesium 400 MG Tabs   Take 400 mg by mouth every morning.  Dose: 400 mg     melatonin 5 mg Tabs   Take 10 mg by mouth at bedtime. Gummie  Dose: 10 mg     methenamine hip 1 GM Tabs  Commonly known as: Hipprex   Take 1 g by mouth 2 times a day.  Dose: 1 g     NON SPECIFIED   Take 3 Capsules by mouth every day. Balance of Nature-Whole Produce FRUITS-patient to provide supply  Dose: 3 Capsule     NON SPECIFIED   Take 3 Capsules by mouth every day. Balance of Nature-Whole Produce VEGGIES-patient to provide supply  Dose: 3 Capsule     omeprazole 20 MG delayed-release capsule  Commonly known as: PriLOSEC   Take 20 mg by mouth 2 times a day.  Dose: 20 mg     polyethylene glycol 3350 17 GM/SCOOP Powd  Commonly known as: Miralax   Take 17 g by mouth every day. May also take 17 g 1 time a day as needed (constipation). Provide an extra dose of miralax daily as needed for constipation or hard stools.     PROBIOTIC PO   Take 1 Capsule by mouth every morning.  Dose: 1 Capsule     sennosides 8.6 MG Tabs  Commonly known as: Senokot   Take 17.2 mg by mouth 2 times a day.  Dose: 17.2 mg     solifenacin 10 MG tablet  Commonly known as: Vesicare   Take 10 mg by mouth  "every evening.  Dose: 10 mg     vibegron 75 MG tablet  Commonly known as: Gemtesa   Take 75 mg by mouth every day.  Dose: 75 mg     Vitamin C 1000 MG Tabs   Take 1 Tablet by mouth every morning.  Dose: 1,000 mg     Vitamin D3 2000 UNIT Caps   Take 1 Capsule by mouth every morning.  Dose: 2,000 Units     Zinc 50 MG Tabs   Take 1 Tablet by mouth every morning.  Dose: 50 mg           * This list has 2 medication(s) that are the same as other medications prescribed for you. Read the directions carefully, and ask your doctor or other care provider to review them with you.                STOP taking these medications      amoxicillin-clavulanate 875-125 MG Tabs  Commonly known as: Augmentin            Allergies  Allergies   Allergen Reactions    Sulfa Drugs Rash     Rash, developed this back in 2015 after being placed on \"sulfa antibiotic for my wound\". Antibiotic was stopped and rash went away. Patient states he had a sulfa antibiotic prior to that time back when he was younger w/o a reaction.        DIET  Orders Placed This Encounter   Procedures    Diet Order Diet: Regular     Standing Status:   Standing     Number of Occurrences:   1     Order Specific Question:   Diet:     Answer:   Regular [1]     LABORATORY  Lab Results   Component Value Date    SODIUM 136 01/11/2025    POTASSIUM 3.7 01/11/2025    CHLORIDE 102 01/11/2025    CO2 21 01/11/2025    GLUCOSE 95 01/11/2025    BUN 13 01/11/2025    CREATININE 0.45 (L) 01/11/2025      Lab Results   Component Value Date    WBC 5.3 01/11/2025    HEMOGLOBIN 12.3 (L) 01/11/2025    HEMATOCRIT 38.0 (L) 01/11/2025    PLATELETCT 149 (L) 01/11/2025      Total time of the discharge process exceeds 41 minutes.    "

## 2025-01-11 NOTE — DISCHARGE PLANNING
Case Management Discharge Planning    Admission Date: 1/9/2025  GMLOS: 4.9  ALOS: 1    Anticipated Discharge Dispo: Discharge Disposition: D/T to home under HHA care in anticipation of covered skilled care (06)  Discharge Address: [address] [facility]  [place] [address]    Met with pt at bedside to obtain choice for HH. Pt would like to resume services with [facility]. Choice form completed and faxed to [facility]    Pt states he will transport himself home. Pt states he notified  that he's returning today

## 2025-01-11 NOTE — DISCHARGE PLANNING
Received Choice Form at: 1456  Agency/Facility Name:  [facility]   Sent Referral per Choice Form at: 5411

## 2025-01-11 NOTE — PROGRESS NOTES
Telemetry summary     Rhythm: SB/SR with 1st degree HB and BBB  Rate: 55-88  Ectopy: rPVCs  Measurements: 0.22/0.14/0.42

## 2025-01-11 NOTE — CARE PLAN
The patient is Stable - Low risk of patient condition declining or worsening    Shift Goals  Clinical Goals: diuresis, monitor respiratory status  Patient Goals: rest and comfort  Family Goals: KEN    Progress made toward(s) clinical / shift goals:    Problem: Knowledge Deficit - Standard  Goal: Patient and family/care givers will demonstrate understanding of plan of care, disease process/condition, diagnostic tests and medications  Outcome: Progressing  Note: Pt updated on POC, agreeable. Monitoring respiratory status. All questions and concerns addressed at this time.      Problem: Fall Risk  Goal: Patient will remain free from falls  Outcome: Progressing  Note: Pt is a high fall risk, fall precautions in place. Call light within reach.

## 2025-01-11 NOTE — PROGRESS NOTES
Telemetry summary    Rhythm: SB/SR with 1st degree HB and BBB  Rate: 44-71  Ectopy: rPAC, rPVC  Measurements: 0.24/0.16/0.46    Normal Values  Rhythm: SR  HR Range:   Measurement: 0.12-0.2/0.06-0.10/0.30-0.52

## 2025-01-11 NOTE — DISCHARGE INSTRUCTIONS
Discharge Instructions per Thomas Dozier M.D.    Keep a daily long of your blood pressure and heart rate taken 3 times a day and take it to your primary car physician or cardiologist for review. This will help guide any medication / dosages adjustments if needed.      Follow-up with primary care provider in 3-5 days.    DIAGNOSIS:  Pneumonia of right upper lobe due to Mycoplasma pneumoniae   Volume overload  Chronic diastolic heart failure     Return to ER if you develop any of the following symptoms fever, shortness of breath, chest pain or loss of consciousness

## 2025-01-12 LAB
BACTERIA SPEC RESP CULT: NORMAL
GRAM STN SPEC: NORMAL
SIGNIFICANT IND 70042: NORMAL
SITE SITE: NORMAL
SOURCE SOURCE: NORMAL

## 2025-01-14 ASSESSMENT — ENCOUNTER SYMPTOMS
WEIGHT LOSS: 0
COUGH: 0
NERVOUS/ANXIOUS: 0
MYALGIAS: 0
VOMITING: 0
BLURRED VISION: 0
HEADACHES: 0
WHEEZING: 0
DIZZINESS: 0
DOUBLE VISION: 0
NAUSEA: 0
BRUISES/BLEEDS EASILY: 0
SPUTUM PRODUCTION: 0
DIARRHEA: 0
FOCAL WEAKNESS: 1
CONSTIPATION: 0
FEVER: 0
PALPITATIONS: 0
CHILLS: 0
ABDOMINAL PAIN: 0
SHORTNESS OF BREATH: 0

## 2025-01-15 ENCOUNTER — OFFICE VISIT (OUTPATIENT)
Dept: WOUND CARE | Facility: MEDICAL CENTER | Age: 75
End: 2025-01-15
Payer: MEDICARE

## 2025-01-15 ENCOUNTER — OFFICE VISIT (OUTPATIENT)
Dept: INFECTIOUS DISEASES | Facility: MEDICAL CENTER | Age: 75
End: 2025-01-15
Attending: NURSE PRACTITIONER
Payer: MEDICARE

## 2025-01-15 VITALS
TEMPERATURE: 97.3 F | SYSTOLIC BLOOD PRESSURE: 122 MMHG | HEART RATE: 47 BPM | RESPIRATION RATE: 18 BRPM | OXYGEN SATURATION: 100 % | DIASTOLIC BLOOD PRESSURE: 72 MMHG

## 2025-01-15 VITALS
HEIGHT: 70 IN | TEMPERATURE: 97 F | OXYGEN SATURATION: 98 % | BODY MASS INDEX: 33.85 KG/M2 | SYSTOLIC BLOOD PRESSURE: 124 MMHG | DIASTOLIC BLOOD PRESSURE: 70 MMHG | HEART RATE: 78 BPM | RESPIRATION RATE: 18 BRPM

## 2025-01-15 DIAGNOSIS — L89.90 PRESSURE ULCERS OF SKIN OF MULTIPLE TOPOGRAPHIC SITES: ICD-10-CM

## 2025-01-15 DIAGNOSIS — G82.54 QUADRIPLEGIA, C5-C7, INCOMPLETE (HCC): ICD-10-CM

## 2025-01-15 DIAGNOSIS — L89.894 PRESSURE INJURY OF LEFT FOOT, STAGE 4 (HCC): ICD-10-CM

## 2025-01-15 DIAGNOSIS — Z87.440 HISTORY OF FREQUENT URINARY TRACT INFECTIONS: ICD-10-CM

## 2025-01-15 DIAGNOSIS — T81.31XD POSTOPERATIVE WOUND DEHISCENCE, SUBSEQUENT ENCOUNTER: ICD-10-CM

## 2025-01-15 DIAGNOSIS — L89.154 SACRAL DECUBITUS ULCER, STAGE IV (HCC): ICD-10-CM

## 2025-01-15 DIAGNOSIS — L89.324 PRESSURE INJURY OF LEFT ISCHIUM, STAGE 4 (HCC): ICD-10-CM

## 2025-01-15 DIAGNOSIS — Z93.59 SUPRAPUBIC CATHETER (HCC): ICD-10-CM

## 2025-01-15 DIAGNOSIS — N31.9 NEUROGENIC URINARY BLADDER DISORDER: ICD-10-CM

## 2025-01-15 DIAGNOSIS — L89.623 PRESSURE INJURY OF LEFT HEEL, STAGE 3 (HCC): ICD-10-CM

## 2025-01-15 DIAGNOSIS — L89.314 PRESSURE INJURY OF RIGHT ISCHIUM, STAGE 4 (HCC): ICD-10-CM

## 2025-01-15 DIAGNOSIS — M86.9 OSTEOMYELITIS OF LEFT LOWER EXTREMITY (HCC): ICD-10-CM

## 2025-01-15 DIAGNOSIS — G82.53 QUADRIPLEGIA, C5-C7 COMPLETE (HCC): ICD-10-CM

## 2025-01-15 DIAGNOSIS — L89.893 PRESSURE INJURY OF LEFT LEG, STAGE 3 (HCC): ICD-10-CM

## 2025-01-15 DIAGNOSIS — N39.0 FREQUENT UTI: ICD-10-CM

## 2025-01-15 DIAGNOSIS — M86.18 OTHER ACUTE OSTEOMYELITIS, OTHER SITE (HCC): ICD-10-CM

## 2025-01-15 PROCEDURE — 11043 DBRDMT MUSC&/FSCA 1ST 20/<: CPT

## 2025-01-15 PROCEDURE — 3078F DIAST BP <80 MM HG: CPT | Performed by: NURSE PRACTITIONER

## 2025-01-15 PROCEDURE — 3078F DIAST BP <80 MM HG: CPT | Performed by: STUDENT IN AN ORGANIZED HEALTH CARE EDUCATION/TRAINING PROGRAM

## 2025-01-15 PROCEDURE — 99213 OFFICE O/P EST LOW 20 MIN: CPT | Performed by: NURSE PRACTITIONER

## 2025-01-15 PROCEDURE — 99211 OFF/OP EST MAY X REQ PHY/QHP: CPT | Performed by: NURSE PRACTITIONER

## 2025-01-15 PROCEDURE — 3074F SYST BP LT 130 MM HG: CPT | Performed by: NURSE PRACTITIONER

## 2025-01-15 PROCEDURE — 99211 OFF/OP EST MAY X REQ PHY/QHP: CPT | Performed by: STUDENT IN AN ORGANIZED HEALTH CARE EDUCATION/TRAINING PROGRAM

## 2025-01-15 PROCEDURE — 11043 DBRDMT MUSC&/FSCA 1ST 20/<: CPT | Performed by: STUDENT IN AN ORGANIZED HEALTH CARE EDUCATION/TRAINING PROGRAM

## 2025-01-15 PROCEDURE — 3074F SYST BP LT 130 MM HG: CPT | Performed by: STUDENT IN AN ORGANIZED HEALTH CARE EDUCATION/TRAINING PROGRAM

## 2025-01-15 RX ORDER — METHENAMINE HIPPURATE 1000 MG/1
1 TABLET ORAL 2 TIMES DAILY
COMMUNITY

## 2025-01-15 NOTE — PROGRESS NOTES
INFECTIOUS  DISEASE  OUTPATIENT CLINIC  NOTE   Subjective   Primary care provider: KATE Phillips.     Reason for Follow Up:   Follow-up for   1. Frequent UTI        2. Suprapubic catheter ([facility])        3. Neurogenic urinary bladder disorder        4. Pressure ulcers of skin of multiple topographic sites        5. Quadriplegia, C5-C7 complete (HCC)        6. History of frequent urinary tract infections        HPI: Referred back to ID clinic, known to ID team for previous Ischial osteomyelitis with abscess   Osvaldo Pate is a 73 y.o. 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   6 weeks of IV antibiotics for sacral osteomyelitis in 3/23/22. He was hospitalized from 4/22 until 4/27/2022 and underwent surgery with Dr. Raman on 4/26 for irrigation and debridement of multiple compartments of the left lower extremity, bone excision, and complex closure of chronic wound using biologic skin substitute.  Patient also with history of ESBL infection in urine noted in 4/2023. s/p debridement necrotic muscle and bone 12/12/23. Operative cultures 12/12/23 ESBL Proteus (also fluoroquinolone and Bactrim resistant), pan susceptible Enterococcus, Prevotella. Completed 6 weeks of IV ertapenem  + IV daptomycin on 1/22/2024.  Patient continued to follow-up with wound care on a regular basis.  Most recently on 6/5/2024 patient followed up with wound care and was noted to have Left ischial wound with exposed bone and with few small bone fragments and some slough. He does report copious drainage from left ischial wound.  Wound team obtained culture and also sent a small dislodged component of bone to pathology.  Pathology results positive for acute osteomyelitis, negative for malignancy. Cultures +  Proteus  mirabilis esbl, Escherichia coli (pan sensitivity), and group G strep.  Referred to ID clinic for antibiotic therapy.  Completed 6-week course of IV ertapenem which ended on 8/7/2024.     Patient referred back to ID by urology.  Patient follow-up with urology on 8/29/24 and 9/9/2024 due to chronic suprapubic catheter.  Suprapubic catheter was exchanged in office as catheter was not draining and there was complete blockage.  History of neurogenic bladder with frequent UTIs (MRSA, Pseudomonas) and kidney stones.  It was reported it was difficult to know if patient was truly symptomatic due to history of paraplegia.  Due to urine retention urine sample was sent to lab during catheter exchange.  Urine culture positive for ESBL Proteus Mirabilis.  Urine dip positive for blood and positive for nitrates and leukocytes.  Outside medical records reviewed      9/18/24-patient referred back to [facility] due to positive urine culture for ESBL Proteus which is grown in other urine cultures in the past.  Given patient has chronic suprapubic catheter most likely colonized.  Multiple urine cultures since 2023 have been positive for the same bacteria. At the time of visit the urine culture is nearly 20 days old and today in clinic patient is not showing any signs or symptoms of infection.  Vital signs stable, he denies any recent fevers, chills, body shakes.  Due to his history of paraplegia it is difficult to diagnose with dysuria or bladder spasm.  There is sediment in his Cazares catheter drainage bag with no blood and clear yellow urine.  Patient states he has been on chronic Methenamine Hippurate 1g BID. Methenamine is a drug that stops the growth of bacteria in urine. This medication also contains an ingredient that helps to make the urine acidic.  Given that patient is asymptomatic today we will hold off on starting IV antibiotic therapy.  Repeat labs CBC plus differential for evaluation.  If leukocytosis then may possibly  "start treatment.  ED precautions discussed.      1/15/25- patient referred back to ID clinic for reoccurrence of UTI. Difficult to truly know if patient is symptomatic due to history of paraplegia.  Urology with routine testing for UTI.  UA in all urology office indication of infection.  Positive for leukocytes and blood.  12/23 urine culture positive for Pseudomonas aeruginosa and was referred to ID clinic..  ER on 1/9/2025 with increasing chest discomfort. Urine culture from 1/9/2025 negative.  Given that patient's urine culture was negative in the ED, urine today clear yellow with no sediment, asymptomatic, and most recent labs on ED admission and at time of discharge with no leukocytosis.  Most likely urine culture was contaminant.  Labs from 1/11/2025 WBC 5.3, neutrophils WNL, mild thrombocytopenia 149, CMP mostly unremarkable.  Patient has been on chronic methenamine Hipprex 1 gr BID.  No signs or symptoms of infection today.  Denies any fever, chills, nausea, vomiting constipation or diarrhea.  Continues to see wound care on a regular basis.  No patient's for antibiotics today.     states he cannot have MRI because he has, \"ferrous metal\" in his body, placed in the 1970s.   Current Antimicrobials:  Previous Antimicrobials:    Other Current Medications:  Home Medications    Medication Sig Taking? Last Dose Authorizing Provider   methenamine hip (HIPPREX) 1 GM Tab Take 1 g by mouth 2 times a day. Yes  Physician Outpatient   furosemide (LASIX) 20 MG Tab Take 1 Tablet by mouth every day for 30 days. Yes Taking Thomas Dozier M.D.   amLODIPine (NORVASC) 2.5 MG Tab Take 2.5 mg by mouth 1 time a day as needed. If  MMHG and Above Yes Taking Physician Outpatient   omeprazole (PRILOSEC) 20 MG delayed-release capsule Take 20 mg by mouth 2 times a day. Yes Taking Physician Outpatient   losartan (COZAAR) 50 MG Tab Take 1 Tablet by mouth every day. Yes Taking KATE Phillips   vibegron (GEMTESA) 75 " MG tablet Take 75 mg by mouth every day. Yes Taking Physician Outpatient   polyethylene glycol 3350 (MIRALAX) 17 GM/SCOOP Powder Take 17 g by mouth every day. May also take 17 g 1 time a day as needed (constipation). Provide an extra dose of miralax daily as needed for constipation or hard stools. Yes Taking Wallace Moyer A.P.R.N.   Sodium Hypochlorite (DAKINS 0.125%, 1/4 STRENGTH,) 0.125 % Solution Dampen gauze with product and apply to wound bed, allow soak for 10 minutes prior to applying wound VAC. Use 3x weekly with VAC changes.  Patient taking differently: Apply  topically every Monday, Wednesday, and Friday. Dampen gauze with product and apply to wound bed, allow soak for 10 minutes prior to applying wound VAC. Use 3x weekly with VAC changes. Yes Taking Steve Hodges M.D.   solifenacin (VESICARE) 10 MG tablet Take 10 mg by mouth every evening. Yes Taking Physician Outpatient   baclofen (LIORESAL) 20 MG tablet TAKE 1 TABLET BY MOUTH 4 TIMES DAILY Yes Taking Wallace Moyer, A.P.R.N.   NON SPECIFIED Take 3 Capsules by mouth every day. Balance of Nature-Whole Produce FRUITS-patient to provide supply Yes Taking Physician Outpatient   NON SPECIFIED Take 3 Capsules by mouth every day. Balance of Nature-Whole Produce VEGGIES-patient to provide supply Yes Taking Physician Outpatient   amLODIPine (NORVASC) 5 MG Tab Take 1 Tablet by mouth as needed (Per MAR if blood pressure if less than 130/80). Yes Taking Wallace Moyer, A.P.R.N.   docusate sodium (COLACE) 100 MG Cap Take 2 Capsules by mouth 2 times a day. Yes Taking Lex Meier M.D.   Simethicone (GAS-X PO) Take 1 Tablet by mouth as needed (For gas). Yes Taking Physician Outpatient   acetaminophen (TYLENOL) 500 MG Tab Take 1,000 mg by mouth every four hours as needed for Moderate Pain. Yes Taking Physician Outpatient   diclofenac sodium (VOLTAREN) 1 % Gel Apply 1 g topically 4 times a day. Apply to both elbows Yes Taking Britni COE  "ANITA OrdonezP.R.N.   aspirin EC 81 MG EC tablet Take 1 Tablet by mouth every day. Yes Taking JF Russ.P.R.N.   Zinc 50 MG Tab Take 1 Tablet by mouth every morning. Yes Taking Physician Outpatient   Cholecalciferol (VITAMIN D3) 2000 UNIT Cap Take 1 Capsule by mouth every morning. Yes Taking Physician Outpatient   Magnesium 400 MG Tab Take 400 mg by mouth every morning. Yes Taking Physician Outpatient   Ascorbic Acid (VITAMIN C) 1000 MG Tab Take 1 Tablet by mouth every morning. Yes Taking Physician Outpatient   melatonin 5 mg Tab Take 10 mg by mouth at bedtime. Gummie Yes Taking Physician Outpatient   Probiotic Product (PROBIOTIC PO) Take 1 Capsule by mouth every morning. Yes Taking Physician Outpatient   sennosides (SENOKOT) 8.6 MG Tab Take 17.2 mg by mouth 2 times a day. Yes Taking Physician Outpatient   ferrous sulfate 325 (65 Fe) MG tablet Take 1 Tablet by mouth 2 times a day. Yes Taking Physician Outpatient        PMH:  Past Medical History:   Diagnosis Date    A-fib ([facility])     Acute cystitis without hematuria 10/23/2021    Acute UTI 06/18/2023    Arrhythmia     Atrial flutter ([facility])     Blood clotting disorder ([facility])     Patient is on Xarelto    Bowel habit changes     Colostomy    Clot hematuria 01/23/2023    GERD (gastroesophageal reflux disease)     Hypertension     Hypokalemia 06/18/2023    Kidney stones     Metabolic acidosis 06/18/2023    Neurogenic bladder     S/P cath    Open wound 11/18/2022    sacrum with wound vac, left ankle, [facility]    Quadriplegia, C5-C7 complete ([facility])     patient reports \" incomplete quad\"    Sepsis secondary to UTI ([facility]) 06/17/2023    SIRS (systemic inflammatory response syndrome) ([facility]) 12/12/2023    Suprapubic catheter ([facility])      Past Surgical History:   Procedure Laterality Date    IRRIGATION & DEBRIDEMENT GENERAL Right 12/12/2023    Procedure: IRRIGATION AND DEBRIDEMENT, WOUND/BUTTOCKS;  Surgeon: Huan Bansal M.D.;  Location: SURGERY [facility];  " Service: General    IRRIGATION & DEBRIDEMENT GENERAL Left 04/26/2022    Procedure: IRRIGATION AND DEBRIDEMENT, WOUND - LEG;  Surgeon: Eric Raman M.D.;  Location: SURGERY [facility];  Service: Orthopedics    WOUND CLOSURE NEURO Left 04/26/2022    Procedure: CLOSURE, WOUND;  Surgeon: Eric Raman M.D.;  Location: [facility];  Service: Orthopedics    ORTHOPEDIC OSTEOTOMY Left 04/26/2022    Procedure: OSTECTOMY;  Surgeon: Eric Raman M.D.;  Location: [facility];  Service: Orthopedics    INCISION AND DRAINAGE ORTHOPEDIC Left 01/27/2022    Procedure: INCISION AND DRAINAGE, WOUND, BY ORTHOPEDICS;  Surgeon: Erasmo Stewart M.D.;  Location: [facility];  Service: Orthopedics    BONE BIOPSY Left 01/27/2022    Procedure: BIOPSY, BONE;  Surgeon: Erasmo Stewart M.D.;  Location: [facility];  Service: Orthopedics    INCISION AND DRAINAGE ORTHOPEDIC  01/22/2022    Procedure: INCISION AND DRAINAGE, WOUND, BY ORTHOPEDICS;  Surgeon: Erasmo Stewart M.D.;  Location: [facility];  Service: Orthopedics    KY CYSTOSCOPY,INSERT URETERAL STENT Left 01/04/2022    Procedure: CYSTOSCOPY, WITH URETERAL STENT INSERTION;  Surgeon: Osvaldo Baltazar M.D.;  Location: [facility];  Service: Urology    KY CYSTO/URETERO/PYELOSCOPY, DX Left 01/04/2022    Procedure: URETEROSCOPY;  Surgeon: Osvaldo Baltazar M.D.;  Location: [facility];  Service: Urology    LASERTRIPSY N/A 01/04/2022    Procedure: LITHOTRIPSY, USING LASER;  Surgeon: Osvaldo Baltazar M.D.;  Location: [facility];  Service: Urology    KY CYSTOSCOPY,INSERT URETERAL STENT Left 12/16/2021    Procedure: CYSTOSCOPY, WITH URETERAL STENT INSERTION;  Surgeon: Aly Bowen M.D.;  Location: [facility];  Service: Urology    KY CYSTO/URETERO/PYELOSCOPY, DX Left 12/16/2021    Procedure: URETEROSCOPY;  Surgeon: Aly Bowen M.D.;  Location: SURGERY [facility];  Service: Urology     LASERTRIPSY Left 2021    Procedure: LITHOTRIPSY, USING LASER;  Surgeon: Aly Bowne M.D.;  Location: SURGERY [facility];  Service: Urology    IRRIGATION & DEBRIDEMENT GENERAL  2020    Procedure: IRRIGATION AND DEBRIDEMENT, WOUND SACRAL ULCER;  Surgeon: Matt Cummins M.D.;  Location: SURGERY [facility];  Service: Plastics    ULCER DEBRIDEMENT N/A 2019    Procedure: debridement of Sacral grade 4 ulcer - W/BONE BIOPSY, 3 liter wash out. bilateral sliding gluteal myocutaneous flap advancement;  Surgeon: Amadeo Moon M.D.;  Location: [facility];  Service: Plastics    FLAP CLOSURE  2019    Procedure: CLOSURE, FLAP - MUSCLE;  Surgeon: Amadeo Moon M.D.;  Location: [facility];  Service: Plastics    COLOSTOMY N/A 2019    Procedure: CREATION, COLOSTOMY -  placement;  Surgeon: Elías Hannah M.D.;  Location: SURGERY [facility];  Service: General    COLOSTOMY      COLOSTOMY TAKEDOWN      HERNIA REPAIR      ORIF, ANKLE      PERCUTANEOUOSPINNING LOWER EXTREMITY       Family History   Problem Relation Age of Onset    Heart Disease Father      Social History     Socioeconomic History    Marital status:      Spouse name: Not on file    Number of children: Not on file    Years of education: Not on file    Highest education level: Not on file   Occupational History    Not on file   Tobacco Use    Smoking status: Former     Current packs/day: 0.00     Average packs/day: 1 pack/day for 10.0 years (10.0 ttl pk-yrs)     Types: Cigarettes     Start date: 1967     Quit date: 1977     Years since quittin.0    Smokeless tobacco: Never   Vaping Use    Vaping status: Never Used   Substance and Sexual Activity    Alcohol use: Yes     Alcohol/week: 4.2 oz     Types: 7 Standard drinks or equivalent per week     Comment: 2/day    Drug use: No    Sexual activity: Not on file   Other Topics Concern    Not on file   Social History  "Narrative    Not on file     Social Drivers of Health     Financial Resource Strain: Not on file   Food Insecurity: No Food Insecurity (1/9/2025)    Hunger Vital Sign     Worried About Running Out of Food in the Last Year: Never true     Ran Out of Food in the Last Year: Never true   Transportation Needs: No Transportation Needs (1/9/2025)    PRAPARE - Transportation     Lack of Transportation (Medical): No     Lack of Transportation (Non-Medical): No   Physical Activity: Not on file   Stress: Not on file   Social Connections: Not on file   Intimate Partner Violence: Not At Risk (1/9/2025)    Humiliation, Afraid, Rape, and Kick questionnaire     Fear of Current or Ex-Partner: No     Emotionally Abused: No     Physically Abused: No     Sexually Abused: No   Housing Stability: Low Risk  (1/9/2025)    Housing Stability Vital Sign     Unable to Pay for Housing in the Last Year: No     Number of Times Moved in the Last Year: 0     Homeless in the Last Year: No           Allergies/Intolerances:  Allergies   Allergen Reactions    Sulfa Drugs Rash     Rash, developed this back in 2015 after being placed on \"sulfa antibiotic for my wound\". Antibiotic was stopped and rash went away. Patient states he had a sulfa antibiotic prior to that time back when he was younger w/o a reaction.          ROS:   Review of Systems   Constitutional:  Negative for chills, fever, malaise/fatigue and weight loss.   HENT:  Negative for congestion and hearing loss.    Eyes:  Negative for blurred vision and double vision.   Respiratory:  Negative for cough, sputum production, shortness of breath and wheezing.    Cardiovascular:  Negative for chest pain and palpitations.   Gastrointestinal:  Negative for abdominal pain, constipation, diarrhea, nausea and vomiting.   Genitourinary:  Negative for dysuria.        Cazares catheter   Musculoskeletal:  Negative for myalgias.   Skin:  Negative for itching and rash.   Neurological:  Positive for focal " "weakness (Paraplegia). Negative for dizziness and headaches.   Endo/Heme/Allergies:  Does not bruise/bleed easily.   Psychiatric/Behavioral:  The patient is not nervous/anxious.       ROS was reviewed and were negative except as above.    Objective    Most Recent Vital Signs:  /70 (BP Location: Left arm, Patient Position: Sitting, BP Cuff Size: Adult)   Pulse 78   Temp 36.1 °C (97 °F) (Temporal)   Resp 18   Ht 1.778 m (5' 10\")   SpO2 98%   BMI 33.85 kg/m²     Physical Exam:  Physical Exam  Vitals reviewed.   Constitutional:       Appearance: Normal appearance. He is obese.      Comments: Wheelchair   HENT:      Head: Normocephalic and atraumatic.      Nose: Nose normal.      Mouth/Throat:      Mouth: Mucous membranes are moist.   Eyes:      Pupils: Pupils are equal, round, and reactive to light.   Cardiovascular:      Rate and Rhythm: Normal rate.   Pulmonary:      Effort: Pulmonary effort is normal.      Breath sounds: Normal breath sounds.   Abdominal:      Palpations: Abdomen is soft.   Genitourinary:     Comments: Suprapubic Cazares catheter  Clear yellow urine in drainage bag  Musculoskeletal:         General: No tenderness.      Cervical back: Normal range of motion and neck supple.      Comments: Stage IV pressure injury to right ischium-wound.   Stage IV pressure injury of left ischium-wound.    Skin:     General: Skin is warm and dry.      Coloration: Skin is not jaundiced.      Findings: No erythema or rash.   Neurological:      Mental Status: He is alert and oriented to person, place, and time.      Motor: Weakness present.   Psychiatric:         Mood and Affect: Mood normal.         Behavior: Behavior normal.          Pertinent Lab/Imaging Results:  [unfilled]  @CMP@  WBC   Date/Time Value Ref Range Status   01/11/2025 01:07 AM 5.3 4.8 - 10.8 K/uL Final     RBC   Date/Time Value Ref Range Status   01/11/2025 01:07 AM 3.76 (L) 4.70 - 6.10 M/uL Final     Hemoglobin   Date/Time Value Ref Range " Status   01/11/2025 01:07 AM 12.3 (L) 14.0 - 18.0 g/dL Final     Hematocrit   Date/Time Value Ref Range Status   01/11/2025 01:07 AM 38.0 (L) 42.0 - 52.0 % Final     MCV   Date/Time Value Ref Range Status   01/11/2025 01:07 .1 (H) 81.4 - 97.8 fL Final     MCH   Date/Time Value Ref Range Status   01/11/2025 01:07 AM 32.7 27.0 - 33.0 pg Final     MCHC   Date/Time Value Ref Range Status   01/11/2025 01:07 AM 32.4 32.3 - 36.5 g/dL Final     MPV   Date/Time Value Ref Range Status   01/11/2025 01:07 AM 9.1 9.0 - 12.9 fL Final      Sodium   Date/Time Value Ref Range Status   01/11/2025 01:07  135 - 145 mmol/L Final     Potassium   Date/Time Value Ref Range Status   01/11/2025 01:07 AM 3.7 3.6 - 5.5 mmol/L Final     Chloride   Date/Time Value Ref Range Status   01/11/2025 01:07  96 - 112 mmol/L Final     Co2   Date/Time Value Ref Range Status   01/11/2025 01:07 AM 21 20 - 33 mmol/L Final     Glucose   Date/Time Value Ref Range Status   01/11/2025 01:07 AM 95 65 - 99 mg/dL Final     Bun   Date/Time Value Ref Range Status   01/11/2025 01:07 AM 13 8 - 22 mg/dL Final     Creatinine   Date/Time Value Ref Range Status   01/11/2025 01:07 AM 0.45 (L) 0.50 - 1.40 mg/dL Final     Bun-Creatinine Ratio   Date/Time Value Ref Range Status   01/22/2024 06:47 AM 45.0 (H) 10.0 - 20.0 Final     Alkaline Phosphatase   Date/Time Value Ref Range Status   01/11/2025 01:07 AM 69 30 - 99 U/L Final     AST(SGOT)   Date/Time Value Ref Range Status   01/11/2025 01:07 AM 10 (L) 12 - 45 U/L Final     ALT(SGPT)   Date/Time Value Ref Range Status   01/11/2025 01:07 AM <5 2 - 50 U/L Final     Total Bilirubin   Date/Time Value Ref Range Status   01/11/2025 01:07 AM 0.3 0.1 - 1.5 mg/dL Final      CPK Total   Date/Time Value Ref Range Status   12/20/2023 03:45 AM 52 0 - 154 U/L Final            Blood Culture Hold   Date Value Ref Range Status   04/15/2023 Collected  Final       Blood Culture Hold   Date Value Ref Range Status   04/15/2023  "Collected  Final       SURGICAL PATHOLOGY CONSULTATION      FINAL DIAGNOSIS:    A. Left ischium bone:         Bone fragment with degenerative changes and focal acute          osteomyelitis         Negative for malignancy                                        Diagnosis performed by:                                      ARMAND CASAREZ DO  ------------------------------------------------------------------------  ---------------------------------------------------------------  CODES: 2002x1  2205x1    SPECIMEN(S)  A. Left ischium bone:    PERTINENT CLINICAL INFORMATION:  Infected wound (T14.8 XXA, L8.9)     GROSS DESCRIPTION:  A. Received in formalin labeled with the patient's name, medical record  number, and \"left ischium\" are 2 irregular calcified fragments of  tissue measuring 0.2-0.7 cm.  Decalcified in HCl. TS 1 /[id number] AGW    MICROSCOPIC DESCRIPTION:  Microscopic examination was performed.  Please see diagnosis.    Report electronically signed by:  ARMAND CASAREZ DO ,  Diagnosis performed at: [facility]  Pathology  Department, [address] [place] [address]  [id number]      Printed 00/00/0000  [id number] NICHOLAS CASEY   Page 1 of 1 MRN#:[id number]      Specimen Collected: 06/05/24  1:35 PM Last Resulted: 06/07/24 10:02 AM      ntains abnormal data CULTURE TISSUE W/ GRM STAIN  Order: [id number]   Status: Final result       Visible to patient: Yes (seen)       Next appt: Today at 04:30 PM in Infectious Diseases (Tin Ascencio A.P.R.N.)       Dx: Infected wound    Specimen Information: Bone   0 Result Notes      Component 2 wk ago   Significant Indicator POS Positive (POS)   Source BONE   Site LEFT ISCHIUM   Culture Result - Abnormal    Gram Stain Result Few Gram negative rods.   Culture Result  Abnormal   Escherichia coli  Moderate growth    Culture Result  Abnormal   Proteus mirabilis ESBL  Moderate growth  Extended Spectrum Beta-lactamase (ESBL) isolated.  ESBL's may be clinically resistant to " therapy with  Penicillins,Cephalosporins or Aztreonam despite  apparent in vitro susceptibility to some of these agents.  The patient requires contact isolation.  Please contact pharmacy or an Infectious Disease Specialist  if you have any questions about appropriate therapy.    Culture Result  Abnormal   Beta Streptococcus Group G  Light growth    Resulting Agency M        Susceptibility     Escherichia coli Proteus mirabilis esbl     ROSE ROSE     Amoxicillin/CA <=8/4 mcg/mL Sensitive       Ampicillin <=8 mcg/mL Sensitive >16 mcg/mL Resistant     Ampicillin/sulbactam <=4/2 mcg/mL Sensitive 8/4 mcg/mL Sensitive     Cefazolin <=2 mcg/mL Sensitive >16 mcg/mL Resistant     Cefepime <=2 mcg/mL Sensitive >16 mcg/mL Resistant     Ceftriaxone <=1 mcg/mL Sensitive >32 mcg/mL Resistant     Cefuroxime <=4 mcg/mL Sensitive >16 mcg/mL Resistant     Ciprofloxacin <=0.25 mcg/mL Sensitive 1 mcg/mL Resistant     Ertapenem <=0.5 mcg/mL Sensitive <=0.5 mcg/mL Sensitive     Gentamicin <=2 mcg/mL Sensitive <=2 mcg/mL Sensitive     Levofloxacin <=0.5 mcg/mL Sensitive 1 mcg/mL Intermediate     Minocycline <=4 mcg/mL Sensitive >8 mcg/mL Resistant     Moxifloxacin <=2 mcg/mL Sensitive >4 mcg/mL Resistant     Pip/Tazobactam <=8 mcg/mL Sensitive <=8 mcg/mL Sensitive     Tigecycline <=2 mcg/mL Sensitive       Tobramycin <=2 mcg/mL Sensitive <=2 mcg/mL Sensitive     Trimeth/Sulfa <=0.5/9.5 m... Sensitive >2/38 mcg/mL Resistant                    Narrative  Performed by: DORY  Left Ischium      Specimen Collected: 06/05/24  1:35 PM Last Resulted: 06/08/24  2:41 PM                          URINE CULTURE(NEW)  Order: [id number]   Status: Final result       Visible to patient: Yes (seen)       Next appt: Today at 03:00 PM in Wound Care (Steve Hodges M.D.)    Specimen Information: Urine   0 Result Notes      Component 3 wk ago   Significant Indicator POS Positive (POS)   Source UR   Site -   Culture Result - Abnormal    Culture Result  Abnormal    Pseudomonas aeruginosa  >100,000 cfu/mL    Resulting Agency M        Susceptibility     Pseudomonas aeruginosa     ROSE     Amikacin <=16 mcg/mL Sensitive     Cefepime <=2 mcg/mL Sensitive     Ceftazidime 4 mcg/mL Sensitive     Ciprofloxacin <=0.25 mcg/mL Sensitive     Levofloxacin <=0.5 mcg/mL Sensitive     Meropenem <=1 mcg/mL Sensitive     Pip/Tazobactam <=8 mcg/mL Sensitive                 Specimen Collected: 12/23/24  4:45 PM Last Resulted: 12/26/24  7:32 AM             Impression/Assessment      1. Frequent UTI        2. Suprapubic catheter ([facility])        3. Neurogenic urinary bladder disorder        4. Pressure ulcers of skin of multiple topographic sites        5. Quadriplegia, C5-C7 complete ([facility])        6. History of frequent urinary tract infections          Osvaldo Pate is a 73 y.o. 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   6 weeks of IV antibiotics for sacral osteomyelitis in 3/23/22. He was hospitalized from 4/22 until 4/27/2022 and underwent surgery with Dr. Raman on 4/26 for irrigation and debridement of multiple compartments of the left lower extremity, bone excision, and complex closure of chronic wound using biologic skin substitute.  History of multiple UTIs with increasing resistance.   History of neurogenic bladder with frequent UTIs (MRSA, Pseudomonas) and kidney stones.  It was reported it was difficult to know if patient was truly symptomatic due to history of paraplegia.      1/15/25- patient referred back to ID clinic for reoccurrence of UTI. Difficult to truly know if patient is symptomatic due to history of paraplegia.  Urology with routine testing for UTI.  UA in all urology office indication of infection.  Positive for leukocytes and blood.  12/23 urine culture  positive for Pseudomonas aeruginosa and was referred to ID clinic..  ER on 1/9/2025 with increasing chest discomfort. Urine culture from 1/9/2025 negative.  Given that patient's urine culture was negative in the ED, urine today clear yellow with no sediment, asymptomatic, and most recent labs on ED admission and at time of discharge with no leukocytosis.  Most likely urine culture was contaminant.  Labs from 1/11/2025 WBC 5.3, neutrophils WNL, mild thrombocytopenia 149, CMP mostly unremarkable.  Patient has been on chronic methenamine Hipprex 1 gr BID.  No signs or symptoms of infection today.  Denies any fever, chills, nausea, vomiting constipation or diarrhea.  Continues to see wound care on a regular basis.  No patient's for antibiotics today.    -At risk for worsening wound infection, sepsis, hospital admissions, and death  PLAN:   - 12/23 urine culture positive for Pseudomonas aeruginosa and was referred to ID clinic. ER on 1/9/2025 with increasing chest discomfort. Urine culture from 1/9/2025 negative.  Given that patient's urine culture was negative in the ED, urine today clear yellow with no sediment, asymptomatic, and most recent labs on ED admission and at time of discharge with no leukocytosis.  Most likely urine culture was contaminant. No indications to start antibiotic treatment at this time  - Continue methenamine Hipprex 1 gr BID.  Managed by urology  -Education provided on S/S of infection and when to report to ER/ call 911   -Continuing weekly follow-up with wound care    Return visit: PRN  . Follow up with primary care physician for chronic medical problems      I have performed a physical exam,  updated ROS and plan today. I have reviewed previous images, labs, and provider notes.      WESTON Pennington.    All Patients should seek medical re-evaluation or report to the ER for new, increasing or worsening symptoms. In some circumstances medical conditions can change from the initial  evaluation and may require emergent medical re-evaluation. This includes but is not limited to chest pain, shortness of breath, atypical abdominal pain, atypical headache, ALOC, fever >101, low blood pressure, high respiratory rate (above 30), low oxygen saturation (below 90%), acute delirium, abnormal bleeding, inability to tolerate any intake, weakness on one side of the body, any worsened or concerning conditions.    Please note that this dictation was created using voice recognition software. I have worked with technical experts from [employer] to optimize the interface.  I have made every reasonable attempt to correct obvious errors, but there may be errors of grammar and possibly content that I did not discover before finalizing the note.

## 2025-01-16 NOTE — PATIENT INSTRUCTIONS
-Keep dressings clean and dry. Change dressings if they become over saturated, soiled or fall off. Otherwise, your home health nurse will change your dressings between wound clinic visits.    -On the days you are not changing your dressings, avoid getting the dressings wet.     -If you need to change your dressings at home: Wash your wound with normal saline, wound cleanser, or unscented soap and water prior to applying your new dressings. Please avoid cleansing with hydrogen peroxide or rubbing alcohol. Hydrogen peroxide and rubbing alcohol are toxic to new tissue and skin cells.    -Should you experience any significant changes in your wounds, such as signs of infection (increasing redness, swelling, localized heat, increased pain, fever > 101 F, chills) or have any questions regarding your home care instructions, please contact the wound center at [phone]. If after hours, contact your primary care physician or go to the hospital emergency room.     -If you are admitted to any hospital, you will need a new referral to come back to the wound clinic and any scheduled appointments that you already have, may be cancelled.     -If you are 5 or more minutes late for an appointment, we reserve the right to cancel and reschedule that appointment. Additionally, if you are habitually late or not showing (3 late cancellations and/or no shows), we reserve the right to cancel your remaining appointments and it will be your responsibility to obtain a new referral if services are still needed.

## 2025-01-16 NOTE — PROGRESS NOTES
Updated wound care order for 1/15/2025 routed to [facility] via [employer].

## 2025-01-22 ENCOUNTER — OFFICE VISIT (OUTPATIENT)
Dept: WOUND CARE | Facility: MEDICAL CENTER | Age: 75
End: 2025-01-22
Payer: MEDICARE

## 2025-01-22 VITALS
HEART RATE: 51 BPM | TEMPERATURE: 98.3 F | SYSTOLIC BLOOD PRESSURE: 104 MMHG | RESPIRATION RATE: 18 BRPM | DIASTOLIC BLOOD PRESSURE: 78 MMHG | OXYGEN SATURATION: 100 %

## 2025-01-22 DIAGNOSIS — G82.54 QUADRIPLEGIA, C5-C7, INCOMPLETE (HCC): ICD-10-CM

## 2025-01-22 DIAGNOSIS — M86.9 OSTEOMYELITIS OF LEFT LOWER EXTREMITY (HCC): ICD-10-CM

## 2025-01-22 DIAGNOSIS — L89.894 PRESSURE INJURY OF LEFT FOOT, STAGE 4 (HCC): ICD-10-CM

## 2025-01-22 DIAGNOSIS — L89.324 PRESSURE INJURY OF LEFT ISCHIUM, STAGE 4 (HCC): ICD-10-CM

## 2025-01-22 DIAGNOSIS — L89.893 PRESSURE INJURY OF LEFT LEG, STAGE 3 (HCC): ICD-10-CM

## 2025-01-22 DIAGNOSIS — L89.314 PRESSURE INJURY OF RIGHT ISCHIUM, STAGE 4 (HCC): ICD-10-CM

## 2025-01-22 DIAGNOSIS — T81.31XD POSTOPERATIVE WOUND DEHISCENCE, SUBSEQUENT ENCOUNTER: ICD-10-CM

## 2025-01-22 DIAGNOSIS — L89.623 PRESSURE INJURY OF LEFT HEEL, STAGE 3 (HCC): ICD-10-CM

## 2025-01-22 DIAGNOSIS — L89.154 SACRAL DECUBITUS ULCER, STAGE IV (HCC): ICD-10-CM

## 2025-01-22 DIAGNOSIS — M86.18 OTHER ACUTE OSTEOMYELITIS, OTHER SITE (HCC): ICD-10-CM

## 2025-01-22 PROCEDURE — 3078F DIAST BP <80 MM HG: CPT | Performed by: STUDENT IN AN ORGANIZED HEALTH CARE EDUCATION/TRAINING PROGRAM

## 2025-01-22 PROCEDURE — 3074F SYST BP LT 130 MM HG: CPT | Performed by: STUDENT IN AN ORGANIZED HEALTH CARE EDUCATION/TRAINING PROGRAM

## 2025-01-22 PROCEDURE — 11043 DBRDMT MUSC&/FSCA 1ST 20/<: CPT

## 2025-01-22 PROCEDURE — 11043 DBRDMT MUSC&/FSCA 1ST 20/<: CPT | Performed by: STUDENT IN AN ORGANIZED HEALTH CARE EDUCATION/TRAINING PROGRAM

## 2025-01-22 NOTE — PROGRESS NOTES
Provider Encounter- Pressure Injury        HISTORY OF PRESENT ILLNESS  Wound History:    START OF CARE IN CLINIC: 1/31/2024 (return to clinic after hospitalization)    REFERRING PROVIDER: Racquel York       WOUNDS-superior coccyx pressure injury, stage IV-resolved                                 Coccyx pressure injury, stage IV-resolved           Inferior coccyx pressure injury, stage IV resolved                                 Right ischial pressure injury, stage IV                                 Left heel pressure injury, recurring stage III-resolved                                 Left posterior lower leg/calf-stage III-resolved                                 Left medial lower leg surgical wound dehiscence-resolved, reopens frequently-resolved            Left ischial pressure injury, stage IV-first observed in clinic 5/1/2024          HISTORY: Patient with history of incomplete quadriplegia referred to [facility] for treatment of a stage IV pressure injury.  He has a history of previous pressure injuries to this area, and underwent muscle flaps in 2019, and then again in 2020.  He was seen in the wound clinic in November 2021 for an ulcer proximal from his current ulcer, and pressure injuries to his left posterior lower leg and left heel.  At that time, it was discovered that the patient had retained VAC foam embedded in the wound bed of the sacral wound.  Attempts were made to get him back to his plastic surgeon, though unsuccessful.  In January he underwent surgical removal of VAC sponge along with excisional debridement of his sacral wound by Dr. Chaves.  After the surgery, his wound went on to heal without incident.   In early April 2022, his home health nurse noted a new sacral ulcer, below the previous ulcer which quickly tripled in size over the following weeks.  The ulcer to his left medial lower leg had also deteriorated, with bone visible at the base..  He was hospitalized from 4/22 until 4/27/2022 and  underwent surgery with Dr. Raman on 4/26 for irrigation and debridement of multiple compartments of the left lower extremity, bone excision, and complex closure of chronic wound using biologic skin substitute.   His sacrococcygeal wound was not surgically addressed during this admission.  He was discharged back to his group home, with home health, and referral to outpatient wound clinic for his sacral wound.  He was instructed to follow-up with his surgeon for his lower leg wound.       Postoperatively, the left medial lower leg incision dehisced.  He was seen by his surgeon at [facility] on 5/11.  The surgeon opted to leave remaining sutures in place, and refer him to the wound clinic for treatment of this wound.   Treatment of this wound was initiated in clinic on 5/12.  During this visit was also noted that his heel DTI had resolved, but that he had a new pressure injury to his left posterior lower extremity.     A new pressure injury was noted to patient's right upper buttock/lower back on 5/20/2022.  Wound was linear in shape, skin discolored but intact.     Abrasion noted to left anterior lower leg.  First observed in clinic on 7/22/2022.  Patient states he bumped his leg into his food tray.     Small DTI noted to patient's left lateral lower leg on 7/29/2022.  Skin intact but discolored.     Large area of deep tissue injury noted to patient's left exterior lower leg.  Patient denied any trauma to this area.  Skin intact.  Wound documented.    1/27/2023: Patient was admitted to [facility] from 1/23-1/25/2023 with gross hematuria. He underwent RICHARD which showed watchman device was in place and he was taken off of Xarelto. While hospitalized wound team was consulted. He was referred back to [facility] and home health upon discharge.    Patient was hospitalized at [facility] for pyelonephritis from 2/26 until 3/2/2023, admitted for fever and general malaise.  He was admitted and initially started on linezolid and meropenem for suspected  UTI and history of multidrug-resistant organisms.  Urine cultures were negative. ID was consulted, recommended CT of chest and abdomen,which were negative for acute findings. However, he was treated with 5 days total course of antibiotics for suspected UTI, and symptoms completely resolved.  During this admission, the inpatient wound team was consulted for treatment of his sacral and lower leg wounds.  A wound culture was taken from his left heel pressure ulcer, negative.  Once stabilized, he was discharged home and referred back to [facility] to resume treatment of his wounds.    Patient was hospitalized at [facility] from 12/11 until 12/23/2023, admitted for fever.  Wound infection suspected.  CT scan of abdomen and pelvis for evaluation of sacral pressure injury showed gas tracking down to the bone consistent with osteomyelitis.  He underwent I&D of right ischial ulcer (documented as buttock) with Dr. Bansal, medial tract leading to an abscess was identified.  Cavity was opened allowing it to drain into the main wound bed.  Wound VAC was placed and managed by wound team during this admission.  A bone scan of patient's left foot was also done, initially concerning for osteomyelitis.  Orthopedic surgery was consulted and did not recommend surgical intervention. ID consulted also, recommended the patient to receive IV ertapenem 1 g every 24 hours plus IV daptomycin 8 mg/kg every 24 hours through 1/22/2024.   He was discharged to [facility] on 1/23 for IV antibiotics and wound care.  From the LTAC he was discharged home on 1/22 with home health and referral back to [facility] to resume management of his wounds  .      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  Never used smokeless tobacco    Patient's problem list, allergies, and current medications reviewed and updated in Epic    Interval History:  Interval History thinned 7/29/2022.  Please see previous notes for complete interval history.   Interval History thinned 1/27/2023. Please see previous note for complete interval history.  Interval History thinned 3/3/2023.  Please see previous notes for complete interval history.    Interval History thinned 8/4/2023.  Please see previous notes for complete interval history.    Interval History thinned 1/31/2024.  Please see previous notes for complete interval history.    Interval History thinned 6/26/2024.  Please see previous notes for complete interval history.      6/19/2024: Clinic visit with Steve Hodges MD. Patient denies any fevers or chills. Reports he is tolerating Abx. He has appointment with ID later today to discuss OM treatment. He is tolerating wound VAC. Slight bone exposed bilaterally in ischial wounds, slightly worse.    6/26/2024 : Clinic visit with ADILSON Arthur, FNPEGGY-BC, CWDINESHN, CFTRACI.   Patient presents today with significant deterioration of his both ischial wounds, with increased depth of undermining.   He now has a PICC line, and will be starting outpatient infusions of ertapenem later today.  He states he is feeling well, denies fevers, chills, nausea, vomiting, cough or shortness of breath.  Due to deterioration of his wound, we did discuss possibly having him go over the hospital for surgical debridement and IV antibiotics.  He was hesitant to do so.  We agreed to see how he looks after a week or so IV antibiotics.  He is agreeable to minimizing time up in his wheelchair.  He also agrees to go to the emergency room immediately if he develops any signs or symptoms of infection.    7/10/2024: Clinic visit with Steve Hodges MD. Patient reports feeling in normal state of health. He missed  last appointment as he had fall out of wheelchair and was evaluated in ED. He denies any injuries from fall. Patient wounds are measuring slightly smaller. He continues on Ertapenem. Patient reports that he has appointment for seating evaluation from [facility] scheduled, but does not recall the date.    7/17/2024 : Clinic visit with ADILSON Arthur, HOLDEN, LILIYA VALERIO.   Anjum states he is feeling well.  Left ischial wound measures significantly smaller today, however measurements vary somewhat depending on pt's position.  Both wounds appear to be progressing slightly, with improving tissue quality.    7/24/2024 : Clinic visit with ADILSON Arthur, HOLDEN, IMAN, LILIYA.   Patient continues to feel well, offers no complaints.  Right ischial wound measures smaller, left ischial wound is larger.  Patient tolerating VAC without any difficulty.  Home health continues to see him in between clinic visits.  He is still receiving daily infusions of IV antibiotics, will be completing these in August.    7/31/2024 : Clinic visit with ADILSON Arthur, HOLDEN, IMAN, LILIYA.   Anjum continues to feel well, his wounds are slowly progressing.  Tolerating VAC.  He is on IV antibiotics for 1 more week.  Admits that he is up in his wheelchair for 10 to 14 hours/day.    8/7/2024 : Clinic visit with ADILSON Arthur FNP-BC, LILIYA VALERIO.   Anjum states he is feeling well today, offers no complaints.  Both wounds measure a bit smaller today, new granulation tissue noted.  He will be getting his last IV antibiotic infusion today.    8/14/2024 : Clinic visit with ADILSON Arthur FNP-BC, IMAN, LILIYA.   Patient continues to feel well.  He has completed his antibiotics, followed up with ID earlier this week, no further antibiotic therapy at this time.  Ischial wounds are slowly progressing.  Lower extremity wounds remain resolved.    8/21/2024 : Clinic visit with ADILSON Arthur FNP-BC, LILIYA VALERIO.   Anjum states he is feeling  well, offers no complaints.  His wounds are slowly progressing, increased granulation.    8/28/2024 : Clinic visit with ADILSON Arthur, HOLDEN, LILIYA VALERIO.   Turk states he is feeling well overall.  His wounds measure slightly smaller.  He has had some urinary symptoms, reports leakage from around his suprapubic catheter last night, and excessively full urine bag.  He has been in contact with his urologist office.  He also mentions that he has a recurring small scab to his nose, states that it falls off only to return shortly after.  This has been going on for several months.  I offered to refer him to dermatology.    9/11/2024 : Clinic visit with ADILSON Arthur, HOLDEN, LILIYA VALERIO.   Turk states he is feeling well.  He missed his appointment last week due to car troubles.  His vehicle is now running well.  Ischial wounds show some improvement, increased granulation tissue.  He does have a small reopening of left posterior lower leg wound, appears to be a small abrasion over area of scar tissue.    9/18/2024: Clinic visit with Steve Hodges MD. Patient reports doing ok. Denies any acute issues with wound VAC. Reports that he was fitted by [employer] for new seat cushion and is being manufactured, he is not sure when will be ready. Patient's wounds appears slightly improved. Left posterior leg wound remains open.    9/25/2024 : Clinic visit with ADILSON Arthur, HOLDEN, IMAN, LILIYA.   Patient states he is feeling well.  His wounds measure about the same.    10/2/2024: Clinic visit with HOLDEN Johnson CWON, LILIYA.  Pt denies fevers, chills, nausea, vomiting. Bilateral ischial ulcers increased in area.  Left IT quality overall worse compared to right IT.  Recommend Dakin's soak.  Continue with VAC to bilateral IT.    10/9/2024 : Clinic visit with ADILSON Arthur, HOLDEN, IMAN, LILIYA.   Patient continues to feel well overall.  His wounds measure about the same, no evidence of infection.   He does have some minor breakdown over the scar tissue of his sacrum which bears watching.  He has not heard from [employer] about his seat cushion, states he will contact them again.    10/16/2024 : Clinic visit with ADILSON Arthur, HOLDEN, IMAN, LILIYA.   Anjum continues to feel well.  His wounds measure slightly smaller.  Sacral wound just slightly open.  He called [employer] earlier this week, was told his new cushion is ready, and that they would deliver it next Monday.  He also states he is due for a new wheelchair, but has not yet become process.      10/23/2024 : Clinic visit with ADILSON Arthur, HOLDEN, IMAN, LILIYA.   Anjum's wounds present today with significantly more odor.  He states he is feeling fine, denies fevers, chills, nausea, vomiting, cough or shortness of breath.  Increased drainage noted.  Wounds measure about the same.  Culture collected in clinic today after debridement.  VAC placed on hold.  Wounds packed with Puracyn moistened silver Hydrofiber.   Patient reminded that he is to go to the emergency room if he notices any increased redness, swelling, drainage or odor, or if he develops fever, chills, nausea or vomiting.    He has a new cushion to his wheelchair.  States that he does not yet have a new wheelchair, will be picking on out the next few weeks.    10/30/2024 : Clinic visit with ADILSON Arthur, HOLDEN, IMAN, LILIYA.   Anjum states he is feeling well.  Wound odor still noticeable.  Culture collected last visit was positive for light growth of Proteus.  Given continued odor, will go ahead and prescribe short course of Augmentin, no other p.o. options available based on sensitivities.  Continue with Dakin's wound cleansing.  Home health to restart VAC on Friday 11/6/2024: Clinic visit with Steve Hodges MD. Patient reports doing well, denies any acute issues. Tolerating augmentin well without side effects. Odor much improved today. Wounds are improving slowly. Continue wound  VAC.    11/14/2024: Clinic visit with Steve Hodges MD. Patient reports doing ok. He was frustrated coming into clinic, was having trouble exiting his van. No evidence of wound infection today    11/20/2024: Clinic visit with Steve Hodges MD. Patient reports feeling in normal state of health. His ischial wounds stable and slowly improving. He has reopened sacral wound however. Denies any signs or symptoms of infection.    11/27/2024: Clinic visit with Steve Hodges MD. Patient reports doing well, denies any acute issues. Sacral wound measuring smaller. Right ischial wound smaller. Left ischial wound larger with increased slough and necrotic tissue at base of wound. Patient has contacted [employer] technician to evaluate seat due to reopening of sacrum, he is pending call back.    12/4/2024: Clinic visit with Steve Hodges MD. Patient reports doing well, denies any signs or symptoms of infection. Denies any issues with wound VAC. Sacrum has resolved. Right ischium wound larger with necrotic tissue, left ischium stable. He denies any traumatic events or any changes to his routine that would have increased pressure on right ischium besides time spent in chair during thanksgiving with family.    12/11/2024: Clinic visit with Steve Hodges MD. Patient reports doing well, denies any acute issues. Wounds are stable. No further necrosis of right ischium. Patient denies any signs or symptoms of infection.    12/18/2024: Clinic visit with Steve Hodges MD. Patient reports doing ok. Reports was diagnosed with UTI by urology, picking up Abx later today, thinks that he was prescribed Augmentin. Right ischial wound measuring larger, dusky tissue concerning for increased pressure. He reports that he has been up in wheelchair more this week with friend in town and has not been using tilt feature of chair as frequently. He was encouraged to spend more time offloading.   Due to holiday's, and dressings only going to be  changed 2x weekly, it is medically necessary to continue wound VAC for now as it would be severely problematic for patient to be sitting with saturated dressings during this period.    1/8/2025: Clinic visit with Steve Hodges MD. Patient reports chest congestion with low grade fevers for last few days. Denies any issues with wounds. He has increased necrosis of left IT pressure injury, he denies any changes in activity and is using tilt feature in chair every 30 min. Patient reports that he has appointment with [facility] next Friday to re-evaluate custom seat.    1/15/2025: Clinic visit with Steve Hodges MD. Patient returns to clinic following hospitalization for mycoplasma pneumonia.  Patient reports that he is feeling better denies any fevers or chills currently.  Patient has upcoming seating eval by [employer].  Wound VAC has been held since last week, wound seem to be slightly improved with holding VAC. Will continue to hold this this week.    1/22/2025: Clinic visit with Steve Hodges MD. Patient reports doing well, denies any acute issues. He had seat cushion re-evaluation and the technician noted that he had no hot spots when he was back far enough in chair, but if he slides forward then he is putting pressure on ischial tuberosities which explains the concern for pressure noted last few weeks. Patient will keep wheelchair slightly tilted back. Wounds with increased granulation tissue. Discontinue wound VAC and he will return to .    REVIEW OF SYSTEMS:   Unchanged from previous wound clinic assessment on 1/15/2025, except as noted in interval history above.      PHYSICAL EXAMINATION:   /78   Pulse (!) 51 Comment: RN notified   Temp 36.8 °C (98.3 °F) (Temporal)   Resp 18   SpO2 100%   Physical Exam  Constitutional:       Appearance: He is obese.   Cardiovascular:      Rate and Rhythm: Normal rate.   Pulmonary:      Effort: Pulmonary effort is normal.   Abdominal:      Comments:  Colostomy left lower quadrant   Genitourinary:     Comments: Suprapubic catheter to down drain   Skin:     Comments: Stage IV pressure injury to right ischium: Right ischial wound slightly improved, slight increase in granulation tissue.    Stage IV pressure injury of left ischium: Wound slightly improved, slightly increased granulation tissue    Stage IV pressure injury sacrum: Remains resolved    All other wounds remain healed, high risk for recurrence     Neurological:      Mental Status: He is alert and oriented to person, place, and time.   Psychiatric:         Mood and Affect: Mood normal.         WOUND ASSESSMENT  Wound 12/12/23 Pressure Injury Ischium Right (Active)   Wound Image    01/22/25 1500   Site Assessment Pink;Red;Yellow 01/22/25 1500   Periwound Assessment Scar tissue;Other (Comment) 01/22/25 1500   Margins Unattached edges 01/22/25 1500   Closure Secondary intention 10/16/24 1700   Drainage Amount Large 01/22/25 1500   Drainage Description Serous;Serosanguineous 01/22/25 1500   Treatments Cleansed;Provider debridement 01/22/25 1500   Offloading/DME Other (comment) 01/22/25 1500   Wound Cleansing Hypochlorus Acid 01/22/25 1500   Periwound Protectant Skin Protectant Wipes to Periwound;Moisture Barrier 01/22/25 1500   Dressing Status Intact;Old drainage 10/02/24 1500   Dressing Changed Changed 01/22/25 1500   Dressing Cleansing/Solutions Normal Saline 01/22/25 1500   Dressing Options Hydrofera Blue Classic;Other (Comments) 01/22/25 1500   Dressing Change/Treatment Frequency Monday, Wednesday, Friday, and As Needed 01/22/25 1500   Wound Team Following Weekly 10/16/24 1700   WOUND NURSE ONLY - Pressure Injury Stage 4 01/22/25 1500   Wound Length (cm) 3.8 cm 01/22/25 1500   Wound Width (cm) 1.8 cm 01/22/25 1500   Wound Depth (cm) 0.7 cm 01/22/25 1500   Wound Surface Area (cm^2) 6.84 cm^2 01/22/25 1500   Wound Volume (cm^3) 4.788 cm^3 01/22/25 1500   Post-Procedure Length (cm) 3.9 cm 01/22/25 1500    Post-Procedure Width (cm) 1.9 cm 01/22/25 1500   Post-Procedure Depth (cm) 0.8 cm 01/22/25 1500   Post-Procedure Surface Area (cm^2) 7.41 cm^2 01/22/25 1500   Post-Procedure Volume (cm^3) 5.928 cm^3 01/22/25 1500   Wound Healing % 91 01/22/25 1500   Tunneling (cm) 0 cm 01/22/25 1500   Undermining (cm) 2.3 cm 01/22/25 1500   Undermining of Wound, 1st Location From 6 o'clock;From 8 o'clock 01/22/25 1500   Undermining (cm) - 2nd location 0 cm 01/08/25 1640   Undermining of Wound, 2nd Location From 12 o'clock;To 1 o'clock 12/04/24 1615   Wound Odor None 01/22/25 1500   Exposed Structures Fascia 01/22/25 1500   Number of days: 408       Wound 05/01/24 Pressure Injury Ischium Left (Active)   Wound Image     01/22/25 1500   Site Assessment Pink;Red;Yellow 01/22/25 1500   Periwound Assessment Scar tissue;Other (Comment) 01/22/25 1500   Margins Unattached edges 01/22/25 1500   Closure Secondary intention 09/18/24 1500   Drainage Amount Large 01/22/25 1500   Drainage Description Serous;Serosanguineous 01/22/25 1500   Treatments Cleansed;Provider debridement 01/22/25 1500   Offloading/DME Other (comment) 01/22/25 1500   Wound Cleansing Hypochlorus Acid 01/22/25 1500   Periwound Protectant Skin Protectant Wipes to Periwound;Moisture Barrier 01/22/25 1500   Dressing Changed Changed 12/04/24 1615   Dressing Cleansing/Solutions Normal Saline 01/22/25 1500   Dressing Options Hydrofera Blue Classic;Other (Comments);Offloading Dressing - Sacral 01/22/25 1500   Dressing Change/Treatment Frequency Monday, Wednesday, Friday, and As Needed 01/22/25 1500   Wound Team Following Weekly 12/18/24 1700   WOUND NURSE ONLY - Pressure Injury Stage 4 01/15/25 1545   Non-staged Wound Description Not applicable 09/18/24 1600   Wound Length (cm) 4.8 cm 01/22/25 1500   Wound Width (cm) 2 cm 01/22/25 1500   Wound Depth (cm) 1.3 cm 01/22/25 1500   Wound Surface Area (cm^2) 9.6 cm^2 01/22/25 1500   Wound Volume (cm^3) 12.48 cm^3 01/22/25 1500  "  Post-Procedure Length (cm) 4.9 cm 01/22/25 1500   Post-Procedure Width (cm) 2 cm 01/22/25 1500   Post-Procedure Depth (cm) 1.3 cm 01/22/25 1500   Post-Procedure Surface Area (cm^2) 9.8 cm^2 01/22/25 1500   Post-Procedure Volume (cm^3) 12.74 cm^3 01/22/25 1500   Wound Healing % -5573 01/22/25 1500   Tunneling (cm) 0 cm 01/22/25 1500   Undermining (cm) 0 cm 01/22/25 1500   Undermining of Wound, 1st Location From 4 o'clock;To 10 o'clock 12/18/24 1700   Wound Odor None 01/22/25 1500   Exposed Structures Muscle;Fascia 01/22/25 1500   Number of days: 267       Wound 01/09/25 Other (comment) Coccyx Left;Right (Active)   Number of days: 14       Wound 01/09/25 Pressure Injury Sacrum (Active)   Number of days: 14       Wound 01/09/25 Other (comment) Toe, 3rd;Toe, 4th Left (Active)   Number of days: 14       PROCEDURE: Excisional debridement of right and left ischial wounds  -2% viscous lidocaine applied topically to wound bed for approximately 5 minutes prior to debridement  -Curette used to debride wound bed.  Excisional debridement was performed to remove devitalized tissue until healthy, bleeding tissue was visualized.  Total area debrided was approximately 17.21 cm², including into undermined areas of wounds.  Tissue debrided into the muscle / fascia layer.    -Bleeding controlled with manual pressure.    -Wound care completed by wound RN, refer to flowsheet  -Patient tolerated the procedure well, without c/o pain or discomfort.    Pertinent Labs and Diagnostics:    Labs:     A1c: No results found for: \"HBA1C\"     [facility] results, 7/1/2022 (under media tab)    CRP 13    ESR 31      IMAGING:     X-ray left tib-fib ordered 2/16/2024 through [facility]    12/11/2023-CT of abdomen pelvis with contrast  IMPRESSION:   1.  Right ischial decubitus ulcer extending to bone with soft tissue gas tracking along the right perineum. Appearance suggesting osteomyelitis, consider component of necrotizing fasciitis as " clinically appropriate.  2.  Small pericardial effusion  3.  Left adrenal nodule, density on prior noncontrast CT demonstrates adenoma.  4.  Hepatomegaly  5.  Enlarged prostate, workup and evaluation for causes of prostate enlargement recommended as clinically appropriate.  6.  Atherosclerosis and atherosclerotic coronary artery disease    12/15/2023-bone scan of left foot  IMPRESSION:     1.  Mild increased activity in the LEFT 1st and 3rd toes on blood pool and delayed images possibly indicating inflammation/infection.  2.  No significant blood flow asymmetry.          VASCULAR STUDIES: No results found.    LAST  WOUND CULTURE:   Lab Results   Component Value Date/Time    CULTRSULT  01/10/2025 04:45 PM     Moderate growth usual upper respiratory ade  No clinically significant Staphylococcus aureus, Methicillin  Resistant Staphylococcus aureus, or Pseudomonas species  isolated.        PATHOLOGY  2/17/2023-bone fragment extracted from left lower extremity wound\\  FINAL DIAGNOSIS:     A. Left leg bone fragment at base of chronic wound:          Extensively degenerated fibrocartilaginous tissue with a rim of           fibrinopurulent debris          Correlate with culture findings            Comment: While no residual intact bone is identified, these           findings are suggestive of adjacent osteomyelitis.      ASSESSMENT AND PLAN:     1. Sacral decubitus ulcer, stage IV (HCC)  Comments: Ulcer first noted in early April 2022 as small open area which quickly enlarged.  This ulcer is present distal from previous sacral ulcer which healed after surgery in January.  Patient has history of flap reconstruction x2 to this area.    1/22/2025: Wound remains resolved  - Continue to protect area with pressure relieving sacral foam dressing.  -Patient does spend a lot of time up in his wheelchair, and wishes to continue to do so for his quality of life.  He lives independently, drives, and is involved with family  activities.    -His presents today with a new cushion in his wheelchair.  Has yet to pick out a new wheelchair  - Known OM that was previously treated. CT scan done during recent hospitalization did not show OM of sacrum, however OM of the ischium noted.  -Patient is very well versed in pressure relief strategies    Wound care: silicone adhesive foam dressing    2. Pressure injury of right ischium, stage 4 (HCC)  Comments: Abscess and OM found on CT during hospitalization in December 2023.  Patient underwent I&D with VAC placement.  IV antibiotics through 1/22/2024.    1/22/2025: Tissue quality continues to improve  - Excisional debridement of nonviable tissue from wound in clinic today, medically necessary to promote wound healing  - Home health  to continue Dakins soaked gauze to wound for approximately 10 minutes with each dressing change  - As wounds appear to be improving. Discontinue wound VAC for now. He will return to .  -Patient to return to clinic weekly for assessment, debridement, and dressing change.    -Home health to change dressing 2 times per week in between clinic visits.    -Patient is very well versed on pressure relief measures, has adequate surface on bed, alternating low air loss.    Wound care: Hydrofera Blue Classic, Enluxtra, Silicone foam    3. Pressure injury of left ischium, stage 4 (HCC)    1/22/2025: Wound measures larger, however increased granulation tissue and improved tissue quality.  - Excisional debridement of wound in clinic today, medically necessary to promote wound healing.  - Patient to return to clinic weekly for assessment, debridement, and dressing change  - Discontinue wound VAC for now  -Home health to change dressing 2 times per week in between clinic visits.    -Patient has new cushion in his wheelchair, see above  -Patient is very well versed on pressure relief measures, has adequate surface on bed, alternating low air loss.    Wound care: Hydrofera Blue  Classic, Enluxtra, Silicone foam    4. Other acute osteomyelitis, other site ([facility])    1/22/2025: Bone fragments removed during clinic visit in June were sent for pathology, polymicrobial, most concerning was an MDR ESBL Proteus.   -Patient completed IV antibiotics.  No further antibiotics at this time per ID  -Patient understands he has a very low threshold for infection/sepsis.  He understands he is to go to the emergency room immediately if he begins to experience fevers, chills, nausea or vomiting, or if he notices radiating erythema or purulent drainage from his wounds.    5. Postoperative wound dehiscence, subsequent encounter  6. Osteomyelitis of left lower extremity ([facility])  Comments: On 4/26/2022 patient underwent irrigation and debridement of multiple compartments of the left lower extremity states for pressure injury, with bone excision, and complex closure of chronic wound using biologic skin substitute.  During postop visit in surgeons office on 5/11, surgical site was noted to be dehisced.      1/22/2025: Skin remains intact  - Wound waxes and wanes due to pressure and underlying known chronic OM  -Patient to be mindful of offloading when supine, should assure that his leg is not rotating medially  -Monitor site each clinic visit  Wound care: Silicone foam dressing, Tubigrip D    7. Pressure injury of left foot, stage 4 ([facility])  8. Pressure injury of left leg, stage 3 ([facility])  9. Pressure injury of left heel, stage 3 ([facility])    1/22/2025: All of these wounds remain healed  -Monitor pressure points each clinic visit  - Patient aware of offloading.    10. Quadriplegia, C5-C7 incomplete ([facility])  Comments: Complicating factor.  Impaired mobility and sensation  -Patient is still spending 7-8 hours/day up in his wheelchair and knows to reposition frequently.  Wears heel float boots bilaterally at all times  - Patient has new custom wheelchair seat. He had refitting and appears he was not sitting back in chair enough  which was causing undue pressure to IT. He is more aware of the correct positioning in chair.    Please note that this note may have been created using voice recognition software. I have worked with [facility] to optimize the interface.  I have made every reasonable attempt to correct obvious errors, but there may be errors of grammar and possibly content that I did not discover before finalizing the note.

## 2025-01-23 NOTE — WOUND TEAM
HH order entered into [facility] and routed to [facility] via [employer].    NPWT discontinued. [employer] notified.

## 2025-01-23 NOTE — PATIENT INSTRUCTIONS
-Keep your wound dressing clean, dry, and intact.     -Contact HH if your dressing is wet/soiled/falling-off.    -Should you experience any significant changes in your wound(s), such as signs of infection (increasing redness, swelling, localized heat, increased pain, fever > 101 F, chills) or have any questions regarding your home care instructions, please contact the wound center at [phone]. If after hours, contact your primary care physician or go to the hospital emergency room.     If you are admitted to any hospital, you will need a new referral to come back to the wound clinic and any scheduled appointments that you already have may be cancelled.     -If you are 5 or more minutes late for an appointment, we reserve the right to cancel and reschedule that appointment. Additionally, if you are habitually late or not attending appts (3 late cancellations and/or no shows), we reserve the right to cancel your remaining appointments and it will be your responsibility to obtain a new referral if services are still needed.

## 2025-01-28 PROBLEM — R32 URINARY LEAKAGE: Status: ACTIVE | Noted: 2025-01-28

## 2025-01-29 ENCOUNTER — OFFICE VISIT (OUTPATIENT)
Dept: WOUND CARE | Facility: MEDICAL CENTER | Age: 75
End: 2025-01-29
Payer: MEDICARE

## 2025-01-29 VITALS
RESPIRATION RATE: 15 BRPM | DIASTOLIC BLOOD PRESSURE: 78 MMHG | OXYGEN SATURATION: 97 % | HEART RATE: 47 BPM | SYSTOLIC BLOOD PRESSURE: 126 MMHG | TEMPERATURE: 96.8 F

## 2025-01-29 DIAGNOSIS — L89.893 PRESSURE INJURY OF LEFT LEG, STAGE 3 (HCC): ICD-10-CM

## 2025-01-29 DIAGNOSIS — G82.54 QUADRIPLEGIA, C5-C7, INCOMPLETE (HCC): ICD-10-CM

## 2025-01-29 DIAGNOSIS — L89.314 PRESSURE INJURY OF RIGHT ISCHIUM, STAGE 4 (HCC): ICD-10-CM

## 2025-01-29 DIAGNOSIS — L89.894 PRESSURE INJURY OF LEFT FOOT, STAGE 4 (HCC): ICD-10-CM

## 2025-01-29 DIAGNOSIS — L89.324 PRESSURE INJURY OF LEFT ISCHIUM, STAGE 4 (HCC): Primary | ICD-10-CM

## 2025-01-29 DIAGNOSIS — M86.9 OSTEOMYELITIS OF LEFT LOWER EXTREMITY (HCC): ICD-10-CM

## 2025-01-29 DIAGNOSIS — T81.31XD POSTOPERATIVE WOUND DEHISCENCE, SUBSEQUENT ENCOUNTER: ICD-10-CM

## 2025-01-29 DIAGNOSIS — L89.154 SACRAL DECUBITUS ULCER, STAGE IV (HCC): ICD-10-CM

## 2025-01-29 DIAGNOSIS — M86.18 OTHER ACUTE OSTEOMYELITIS, OTHER SITE (HCC): ICD-10-CM

## 2025-01-29 DIAGNOSIS — L89.623 PRESSURE INJURY OF LEFT HEEL, STAGE 3 (HCC): ICD-10-CM

## 2025-01-29 PROCEDURE — 11043 DBRDMT MUSC&/FSCA 1ST 20/<: CPT

## 2025-01-29 NOTE — PROGRESS NOTES
Provider Encounter- Pressure Injury        HISTORY OF PRESENT ILLNESS  Wound History:    START OF CARE IN CLINIC: 1/31/2024 (return to clinic after hospitalization)    REFERRING PROVIDER: Racquel York       WOUNDS-superior coccyx pressure injury, stage IV-resolved                                 Coccyx pressure injury, stage IV-resolved           Inferior coccyx pressure injury, stage IV resolved                                 Right ischial pressure injury, stage IV                                 Left heel pressure injury, recurring stage III-resolved                                 Left posterior lower leg/calf-stage III-resolved                                 Left medial lower leg surgical wound dehiscence-resolved, reopens frequently-resolved            Left ischial pressure injury, stage IV-first observed in clinic 5/1/2024          HISTORY: Patient with history of incomplete quadriplegia referred to [facility] for treatment of a stage IV pressure injury.  He has a history of previous pressure injuries to this area, and underwent muscle flaps in 2019, and then again in 2020.  He was seen in the wound clinic in November 2021 for an ulcer proximal from his current ulcer, and pressure injuries to his left posterior lower leg and left heel.  At that time, it was discovered that the patient had retained VAC foam embedded in the wound bed of the sacral wound.  Attempts were made to get him back to his plastic surgeon, though unsuccessful.  In January he underwent surgical removal of VAC sponge along with excisional debridement of his sacral wound by Dr. Chaves.  After the surgery, his wound went on to heal without incident.   In early April 2022, his home health nurse noted a new sacral ulcer, below the previous ulcer which quickly tripled in size over the following weeks.  The ulcer to his left medial lower leg had also deteriorated, with bone visible at the base..  He was hospitalized from 4/22 until 4/27/2022 and  underwent surgery with Dr. Raman on 4/26 for irrigation and debridement of multiple compartments of the left lower extremity, bone excision, and complex closure of chronic wound using biologic skin substitute.   His sacrococcygeal wound was not surgically addressed during this admission.  He was discharged back to his group home, with home health, and referral to outpatient wound clinic for his sacral wound.  He was instructed to follow-up with his surgeon for his lower leg wound.       Postoperatively, the left medial lower leg incision dehisced.  He was seen by his surgeon at [facility] on 5/11.  The surgeon opted to leave remaining sutures in place, and refer him to the wound clinic for treatment of this wound.   Treatment of this wound was initiated in clinic on 5/12.  During this visit was also noted that his heel DTI had resolved, but that he had a new pressure injury to his left posterior lower extremity.     A new pressure injury was noted to patient's right upper buttock/lower back on 5/20/2022.  Wound was linear in shape, skin discolored but intact.     Abrasion noted to left anterior lower leg.  First observed in clinic on 7/22/2022.  Patient states he bumped his leg into his food tray.     Small DTI noted to patient's left lateral lower leg on 7/29/2022.  Skin intact but discolored.     Large area of deep tissue injury noted to patient's left exterior lower leg.  Patient denied any trauma to this area.  Skin intact.  Wound documented.    1/27/2023: Patient was [place] [facility] [place]1/25/[place] with gross hematuria. He underwent RICHARD which showed watchman device was in place and he was taken off of Xarelto. While hospitalized wound team was consulted. He was referred back to [facility] and home health upon discharge.    Patient was hospitalized at [facility] for pyelonephritis from 2/26 until 3/2/2023, admitted for fever and general malaise.  He was admitted and initially started on linezolid and meropenem for suspected  UTI and history of multidrug-resistant organisms.  Urine cultures were negative. ID was consulted, recommended CT of chest and abdomen,which were negative for acute findings. However, he was treated with 5 days total course of antibiotics for suspected UTI, and symptoms completely resolved.  During this admission, the inpatient wound team was consulted for treatment of his sacral and lower leg wounds.  A wound culture was taken from his left heel pressure ulcer, negative.  Once stabilized, he was discharged home and referred back to [facility] to resume treatment of his wounds.    Patient was hospitalized at [facility] from 12/11 until 12/23/2023, admitted for fever.  Wound infection suspected.  CT scan of abdomen and pelvis for evaluation of sacral pressure injury showed gas tracking down to the bone consistent with osteomyelitis.  He underwent I&D of right ischial ulcer (documented as buttock) with Dr. Bansal, medial tract leading to an abscess was identified.  Cavity was opened allowing it to drain into the main wound bed.  Wound VAC was placed and managed by wound team during this admission.  A bone scan of patient's left foot was also done, initially concerning for osteomyelitis.  Orthopedic surgery was consulted and did not recommend surgical intervention. ID consulted also, recommended the patient to receive IV ertapenem 1 g every 24 hours plus IV daptomycin 8 mg/kg every 24 hours through 1/22/2024.   He was discharged to [facility] on 1/23 for IV antibiotics and wound care.  From the LTAC he was discharged home on 1/22 with home health and referral back to [facility] to resume management of his wounds  .      Pertinent Medical History: Incomplete quadriplegia, history of stage IV pressure injuries, history of flap procedures to sacral pressure injuries, osteomyelitis, obesity, colostomy in place   Contributing factors: Immobility and Obesity, impaired sensation    Personal support: Attendant-staff at [facility] and home health  nursing    TOBACCO USE:   Former smoker, quit in 1977.  Never used smokeless tobacco    Patient's problem list, allergies, and current medications reviewed and updated in Epic    Interval History:  Interval History thinned 7/29/2022.  Please see previous notes for complete interval history.   Interval History thinned 1/27/2023. Please see previous note for complete interval history.  Interval History thinned 3/3/2023.  Please see previous notes for complete interval history.    Interval History thinned 8/4/2023.  Please see previous notes for complete interval history.    Interval History thinned 1/31/2024.  Please see previous notes for complete interval history.    Interval History thinned 6/26/2024.  Please see previous notes for complete interval history.      6/19/2024: Clinic visit with Steve Hodges MD. Patient denies any fevers or chills. Reports he is tolerating Abx. He has appointment with ID later today to discuss OM treatment. He is tolerating wound VAC. Slight bone exposed bilaterally in ischial wounds, slightly worse.    6/26/2024 : Clinic visit with ADILSON Arthur, FNPEGGY-BC, CWDINESHN, CFTRACI.   Patient presents today with significant deterioration of his both ischial wounds, with increased depth of undermining.   He now has a PICC line, and will be starting outpatient infusions of ertapenem later today.  He states he is feeling well, denies fevers, chills, nausea, vomiting, cough or shortness of breath.  Due to deterioration of his wound, we did discuss possibly having him go over the hospital for surgical debridement and IV antibiotics.  He was hesitant to do so.  We agreed to see how he looks after a week or so IV antibiotics.  He is agreeable to minimizing time up in his wheelchair.  He also agrees to go to the emergency room immediately if he develops any signs or symptoms of infection.    7/10/2024: Clinic visit with Steve Hodges MD. Patient reports feeling in normal state of health. He missed  last appointment as he had fall out of wheelchair and was evaluated in ED. He denies any injuries from fall. Patient wounds are measuring slightly smaller. He continues on Ertapenem. Patient reports that he has appointment for seating evaluation from [facility] scheduled, but does not recall the date.    7/17/2024 : Clinic visit with ADILSON Arthur, HOLDEN, LILIYA VALERIO.   Anjum states he is feeling well.  Left ischial wound measures significantly smaller today, however measurements vary somewhat depending on pt's position.  Both wounds appear to be progressing slightly, with improving tissue quality.    7/24/2024 : Clinic visit with ADILSON Arthur, HOLDEN, IMAN, LILIYA.   Patient continues to feel well, offers no complaints.  Right ischial wound measures smaller, left ischial wound is larger.  Patient tolerating VAC without any difficulty.  Home health continues to see him in between clinic visits.  He is still receiving daily infusions of IV antibiotics, will be completing these in August.    7/31/2024 : Clinic visit with ADILSON Arthur, HOLDEN, IMAN, LILIYA.   Anjum continues to feel well, his wounds are slowly progressing.  Tolerating VAC.  He is on IV antibiotics for 1 more week.  Admits that he is up in his wheelchair for 10 to 14 hours/day.    8/7/2024 : Clinic visit with ADILSON Arthur FNP-BC, LILIYA VALERIO.   Anjum states he is feeling well today, offers no complaints.  Both wounds measure a bit smaller today, new granulation tissue noted.  He will be getting his last IV antibiotic infusion today.    8/14/2024 : Clinic visit with ADILSON Arthur FNP-BC, IMAN, LILIYA.   Patient continues to feel well.  He has completed his antibiotics, followed up with ID earlier this week, no further antibiotic therapy at this time.  Ischial wounds are slowly progressing.  Lower extremity wounds remain resolved.    8/21/2024 : Clinic visit with ADILSON Arthur FNP-BC, LILIYA VALERIO.   Anjum states he is feeling  well, offers no complaints.  His wounds are slowly progressing, increased granulation.    8/28/2024 : Clinic visit with ADILSON Arthur, HOLDEN, LILIYA VALERIO.   Turk states he is feeling well overall.  His wounds measure slightly smaller.  He has had some urinary symptoms, reports leakage from around his suprapubic catheter last night, and excessively full urine bag.  He has been in contact with his urologist office.  He also mentions that he has a recurring small scab to his nose, states that it falls off only to return shortly after.  This has been going on for several months.  I offered to refer him to dermatology.    9/11/2024 : Clinic visit with ADLISON Arthur, HOLDEN, LILIYA VALERIO.   Turk states he is feeling well.  He missed his appointment last week due to car troubles.  His vehicle is now running well.  Ischial wounds show some improvement, increased granulation tissue.  He does have a small reopening of left posterior lower leg wound, appears to be a small abrasion over area of scar tissue.    9/18/2024: Clinic visit with Steve Hodges MD. Patient reports doing ok. Denies any acute issues with wound VAC. Reports that he was fitted by [employer] for new seat cushion and is being manufactured, he is not sure when will be ready. Patient's wounds appears slightly improved. Left posterior leg wound remains open.    9/25/2024 : Clinic visit with ADILSON Arthur, HOLDEN, IMAN, LILIYA.   Patient states he is feeling well.  His wounds measure about the same.    10/2/2024: Clinic visit with HOLDEN Johnson CWON, LILIYA.  Pt denies fevers, chills, nausea, vomiting. Bilateral ischial ulcers increased in area.  Left IT quality overall worse compared to right IT.  Recommend Dakin's soak.  Continue with VAC to bilateral IT.    10/9/2024 : Clinic visit with ADILSON Arthur, HOLDEN, IMAN, LILIYA.   Patient continues to feel well overall.  His wounds measure about the same, no evidence of infection.   He does have some minor breakdown over the scar tissue of his sacrum which bears watching.  He has not heard from [employer] about his seat cushion, states he will contact them again.    10/16/2024 : Clinic visit with ADILSON Arthur, HOLDEN, IMAN, LILIYA.   Anjum continues to feel well.  His wounds measure slightly smaller.  Sacral wound just slightly open.  He called [employer] earlier this week, was told his new cushion is ready, and that they would deliver it next Monday.  He also states he is due for a new wheelchair, but has not yet become process.      10/23/2024 : Clinic visit with ADILSON Arthur, HOLDEN, IMAN, LILIYA.   Anjum's wounds present today with significantly more odor.  He states he is feeling fine, denies fevers, chills, nausea, vomiting, cough or shortness of breath.  Increased drainage noted.  Wounds measure about the same.  Culture collected in clinic today after debridement.  VAC placed on hold.  Wounds packed with Puracyn moistened silver Hydrofiber.   Patient reminded that he is to go to the emergency room if he notices any increased redness, swelling, drainage or odor, or if he develops fever, chills, nausea or vomiting.    He has a new cushion to his wheelchair.  States that he does not yet have a new wheelchair, will be picking on out the next few weeks.    10/30/2024 : Clinic visit with ADILSON Arthur, HOLDEN, IMAN, LILIYA.   Anjum states he is feeling well.  Wound odor still noticeable.  Culture collected last visit was positive for light growth of Proteus.  Given continued odor, will go ahead and prescribe short course of Augmentin, no other p.o. options available based on sensitivities.  Continue with Dakin's wound cleansing.  Home health to restart VAC on Friday 11/6/2024: Clinic visit with Steve Hodges MD. Patient reports doing well, denies any acute issues. Tolerating augmentin well without side effects. Odor much improved today. Wounds are improving slowly. Continue wound  VAC.    11/14/2024: Clinic visit with Steve Hodges MD. Patient reports doing ok. He was frustrated coming into clinic, was having trouble exiting his van. No evidence of wound infection today    11/20/2024: Clinic visit with Steve Hodges MD. Patient reports feeling in normal state of health. His ischial wounds stable and slowly improving. He has reopened sacral wound however. Denies any signs or symptoms of infection.    11/27/2024: Clinic visit with Steve Hodges MD. Patient reports doing well, denies any acute issues. Sacral wound measuring smaller. Right ischial wound smaller. Left ischial wound larger with increased slough and necrotic tissue at base of wound. Patient has contacted [employer] technician to evaluate seat due to reopening of sacrum, he is pending call back.    12/4/2024: Clinic visit with Steve Hodges MD. Patient reports doing well, denies any signs or symptoms of infection. Denies any issues with wound VAC. Sacrum has resolved. Right ischium wound larger with necrotic tissue, left ischium stable. He denies any traumatic events or any changes to his routine that would have increased pressure on right ischium besides time spent in chair during thanksgiving with family.    12/11/2024: Clinic visit with Steve Hodges MD. Patient reports doing well, denies any acute issues. Wounds are stable. No further necrosis of right ischium. Patient denies any signs or symptoms of infection.    12/18/2024: Clinic visit with Steve Hodges MD. Patient reports doing ok. Reports was diagnosed with UTI by urology, picking up Abx later today, thinks that he was prescribed Augmentin. Right ischial wound measuring larger, dusky tissue concerning for increased pressure. He reports that he has been up in wheelchair more this week with friend in town and has not been using tilt feature of chair as frequently. He was encouraged to spend more time offloading.   Due to holiday's, and dressings only going to be  changed 2x weekly, it is medically necessary to continue wound VAC for now as it would be severely problematic for patient to be sitting with saturated dressings during this period.    1/8/2025: Clinic visit with Steve Hodges MD. Patient reports chest congestion with low grade fevers for last few days. Denies any issues with wounds. He has increased necrosis of left IT pressure injury, he denies any changes in activity and is using tilt feature in chair every 30 min. Patient reports that he has appointment with [facility] next Friday to re-evaluate custom seat.    1/15/2025: Clinic visit with Steve Hodges MD. Patient returns to clinic following hospitalization for mycoplasma pneumonia.  Patient reports that he is feeling better denies any fevers or chills currently.  Patient has upcoming seating eval by [employer].  Wound VAC has been held since last week, wound seem to be slightly improved with holding VAC. Will continue to hold this this week.    1/22/2025: Clinic visit with Steve Hodges MD. Patient reports doing well, denies any acute issues. He had seat cushion re-evaluation and the technician noted that he had no hot spots when he was back far enough in chair, but if he slides forward then he is putting pressure on ischial tuberosities which explains the concern for pressure noted last few weeks. Patient will keep wheelchair slightly tilted back. Wounds with increased granulation tissue. Discontinue wound VAC and he will return to .    1/29/2025: Clinic visit with Steve Hodges MD. Patient reports doing ok, denies any signs or symptoms of infection. Patient assures me that he is sitting further back in chair as recommended by PT/wheel chair technician. Wounds are slowly improving, he reports less drainage. Will trial use of Hydrofiber instead of Enluxtra.    REVIEW OF SYSTEMS:   Unchanged from previous wound clinic assessment on 1/22/2025, except as noted in interval history above.       PHYSICAL EXAMINATION:   /78   Pulse (!) 47   Temp 36 °C (96.8 °F) (Temporal)   Resp 15   SpO2 97%   Physical Exam  Constitutional:       Appearance: He is obese.   Cardiovascular:      Rate and Rhythm: Normal rate.   Pulmonary:      Effort: Pulmonary effort is normal.   Abdominal:      Comments: Colostomy left lower quadrant   Genitourinary:     Comments: Suprapubic catheter to down drain   Skin:     Comments: Stage IV pressure injury to right ischium: Wound improving, measuring smaller, good granulation tissue formation. Thin layer of slough.    Stage IV pressure injury of left ischium: Wound stable, measurements positional. Slight increase in granulation tissue. Thin layer of slough. No evidence of infection.    Stage IV pressure injury sacrum: Remains resolved    All other wounds remain healed, high risk for recurrence     Neurological:      Mental Status: He is alert and oriented to person, place, and time.   Psychiatric:         Mood and Affect: Mood normal.         WOUND ASSESSMENT  Wound 12/12/23 Pressure Injury Ischium Right (Active)   Wound Image    01/29/25 1550   Site Assessment Red;Hypergranulation 01/29/25 1550   Periwound Assessment Fragile 01/29/25 1550   Margins Unattached edges 01/29/25 1550   Closure Secondary intention 10/16/24 1700   Drainage Amount Large 01/29/25 1550   Drainage Description Serosanguineous 01/29/25 1550   Treatments Cleansed;Provider debridement;Site care 01/29/25 1550   Offloading/DME Other (comment) 01/29/25 1550   Wound Cleansing Hypochlorus Acid 01/29/25 1550   Periwound Protectant Moisture Barrier 01/29/25 1550   Dressing Status Intact;Old drainage 10/02/24 1500   Dressing Changed Changed 01/22/25 1500   Dressing Cleansing/Solutions Normal Saline 01/29/25 1550   Dressing Options Hydrofera Blue Classic;Hydrofiber;Offloading Dressing - Sacral 01/29/25 1550   Dressing Change/Treatment Frequency Monday, Wednesday, Friday, and As Needed 01/29/25 1550   Wound Team  Following Weekly 10/16/24 1700   WOUND NURSE ONLY - Pressure Injury Stage 4 01/29/25 1550   Wound Length (cm) 4.5 cm 01/29/25 1550   Wound Width (cm) 2.5 cm 01/29/25 1550   Wound Depth (cm) 0.5 cm 01/29/25 1550   Wound Surface Area (cm^2) 11.25 cm^2 01/29/25 1550   Wound Volume (cm^3) 5.625 cm^3 01/29/25 1550   Post-Procedure Length (cm) 4.6 cm 01/29/25 1550   Post-Procedure Width (cm) 2.5 cm 01/29/25 1550   Post-Procedure Depth (cm) 0.5 cm 01/29/25 1550   Post-Procedure Surface Area (cm^2) 11.5 cm^2 01/29/25 1550   Post-Procedure Volume (cm^3) 5.75 cm^3 01/29/25 1550   Wound Healing % 90 01/29/25 1550   Tunneling (cm) 0 cm 01/29/25 1550   Undermining (cm) 1.7 cm 01/29/25 1550   Undermining of Wound, 1st Location From 6 o'clock;To 8 o'clock 01/29/25 1550   Undermining (cm) - 2nd location 0 cm 01/08/25 1640   Undermining of Wound, 2nd Location From 12 o'clock;To 1 o'clock 12/04/24 1615   Wound Odor None 01/29/25 1550   Exposed Structures None 01/29/25 1550   Number of days: 414       Wound 05/01/24 Pressure Injury Ischium Left (Active)   Wound Image    01/29/25 1550   Site Assessment Pink;Red;Yellow;Hypergranulation 01/29/25 1550   Periwound Assessment Maceration 01/29/25 1550   Margins Unattached edges 01/29/25 1550   Closure Secondary intention 09/18/24 1500   Drainage Amount Large 01/29/25 1550   Drainage Description Serosanguineous 01/29/25 1550   Treatments Cleansed;Provider debridement;Site care 01/29/25 1550   Offloading/DME Other (comment) 01/29/25 1550   Wound Cleansing Hypochlorus Acid 01/29/25 1550   Periwound Protectant Moisture Barrier 01/29/25 1550   Dressing Changed Changed 12/04/24 1615   Dressing Cleansing/Solutions Normal Saline 01/29/25 1550   Dressing Options Hydrofera Blue Classic;Hydrofiber;Offloading Dressing - Sacral 01/29/25 1550   Dressing Change/Treatment Frequency Monday, Wednesday, Friday, and As Needed 01/29/25 1550   Wound Team Following Weekly 12/18/24 1700   WOUND NURSE ONLY -  "Pressure Injury Stage 4 01/29/25 1550   Non-staged Wound Description Not applicable 09/18/24 1600   Wound Length (cm) 5 cm 01/29/25 1550   Wound Width (cm) 1 cm 01/29/25 1550   Wound Depth (cm) 1.2 cm 01/29/25 1550   Wound Surface Area (cm^2) 5 cm^2 01/29/25 1550   Wound Volume (cm^3) 6 cm^3 01/29/25 1550   Post-Procedure Length (cm) 5 cm 01/29/25 1550   Post-Procedure Width (cm) 1.1 cm 01/29/25 1550   Post-Procedure Depth (cm) 1.2 cm 01/29/25 1550   Post-Procedure Surface Area (cm^2) 5.5 cm^2 01/29/25 1550   Post-Procedure Volume (cm^3) 6.6 cm^3 01/29/25 1550   Wound Healing % -2627 01/29/25 1550   Tunneling (cm) 0 cm 01/29/25 1550   Undermining (cm) 0 cm 01/29/25 1550   Undermining of Wound, 1st Location From 4 o'clock;To 10 o'clock 12/18/24 1700   Wound Odor None 01/29/25 1550   Exposed Structures None 01/29/25 1550   Number of days: 273       Wound 01/09/25 Other (comment) Coccyx Left;Right (Active)   Number of days: 20       Wound 01/09/25 Pressure Injury Sacrum (Active)   Number of days: 20       Wound 01/09/25 Other (comment) Toe, 3rd;Toe, 4th Left (Active)   Number of days: 20       PROCEDURE: Excisional debridement of right and left ischial wounds  -2% viscous lidocaine applied topically to wound bed for approximately 5 minutes prior to debridement  -Curette used to debride wound bed.  Excisional debridement was performed to remove devitalized tissue until healthy, bleeding tissue was visualized.  Total area debrided was approximately 17 cm², including into undermined areas of wounds.  Tissue debrided into the muscle / fascia layer.    -Bleeding controlled with manual pressure.    -Wound care completed by wound RN, refer to flowsheet  -Patient tolerated the procedure well, without c/o pain or discomfort.    Pertinent Labs and Diagnostics:    Labs:     A1c: No results found for: \"HBA1C\"     [facility] results, 7/1/2022 (under media tab)    CRP 13    ESR 31      IMAGING:     X-ray left tib-fib ordered 2/16/2024 " [facility]    12/11/2023-CT of abdomen pelvis with contrast  IMPRESSION:   1.  Right ischial decubitus ulcer extending to bone with soft tissue gas tracking along the right perineum. Appearance suggesting osteomyelitis, consider component of necrotizing fasciitis as clinically appropriate.  2.  Small pericardial effusion  3.  Left adrenal nodule, density on prior noncontrast CT demonstrates adenoma.  4.  Hepatomegaly  5.  Enlarged prostate, workup and evaluation for causes of prostate enlargement recommended as clinically appropriate.  6.  Atherosclerosis and atherosclerotic coronary artery disease    12/15/2023-bone scan of left foot  IMPRESSION:     1.  Mild increased activity in the LEFT 1st and 3rd toes on blood pool and delayed images possibly indicating inflammation/infection.  2.  No significant blood flow asymmetry.          VASCULAR STUDIES: No results found.    LAST  WOUND CULTURE:   Lab Results   Component Value Date/Time    CULTRSULT  01/10/2025 04:45 PM     Moderate growth usual upper respiratory ade  No clinically significant Staphylococcus aureus, Methicillin  Resistant Staphylococcus aureus, or Pseudomonas species  isolated.        PATHOLOGY  2/17/2023-bone fragment extracted from left lower extremity wound\\  FINAL DIAGNOSIS:     A. Left leg bone fragment at base of chronic wound:          Extensively degenerated fibrocartilaginous tissue with a rim of           fibrinopurulent debris          Correlate with culture findings            Comment: While no residual intact bone is identified, these           findings are suggestive of adjacent osteomyelitis.      ASSESSMENT AND PLAN:     1. Sacral decubitus ulcer, stage IV (HCC)  Comments: Ulcer first noted in early April 2022 as small open area which quickly enlarged.  This ulcer is present distal from previous sacral ulcer which healed after surgery in January.  Patient has history of flap reconstruction x2 to this  area.    1/29/2025: Wound remains resolved  - Continue to protect area with pressure relieving sacral foam dressing.  -Patient does spend a lot of time up in his wheelchair, and wishes to continue to do so for his quality of life.  He lives independently, drives, and is involved with family activities.    -His presents today with a new cushion in his wheelchair.  Has yet to pick out a new wheelchair  - Known OM that was previously treated. CT scan done during recent hospitalization did not show OM of sacrum, however OM of the ischium noted.  -Patient is very well versed in pressure relief strategies    Wound care: silicone adhesive foam dressing    2. Pressure injury of right ischium, stage 4 (HCC)  Comments: Abscess and OM found on CT during hospitalization in December 2023.  Patient underwent I&D with VAC placement.  IV antibiotics through 1/22/2024.    1/29/2025: Wound measuring smaller, increased granulation tissue.  - Excisional debridement of nonviable tissue from wound in clinic today, medically necessary to promote wound healing  - VAC discontinued previously.  -Patient to return to clinic weekly for assessment, debridement, and dressing change.    -Home health to change dressing 2 times per week in between clinic visits.    -Patient is very well versed on pressure relief measures, has adequate surface on bed, alternating low air loss.  - Drainage has decreased, will hold Enluxtra for now.    Wound care: Hydrofera Blue Classic, Hydrofiber, Silicone foam    3. Pressure injury of left ischium, stage 4 (HCC)    1/29/2025: Wound measures smaller, however wound measurements appear positional. Slight increase in granulation tissue.  - Excisional debridement of wound in clinic today, medically necessary to promote wound healing.  - Patient to return to clinic weekly for assessment, debridement, and dressing change  - Discontinue wound VAC for now  -Home health to change dressing 2 times per week in between clinic  visits.    -Patient has new cushion in his wheelchair, see above  -Patient is very well versed on pressure relief measures, has adequate surface on bed, alternating low air loss.    Wound care: Hydrofera Blue Classic, Hydrofiber, Silicone foam    4. Other acute osteomyelitis, other site ([facility])    1/29/2025: Bone fragments removed during clinic visit in June were sent for pathology, polymicrobial, most concerning was an MDR ESBL Proteus.   -Patient completed IV antibiotics.  No further antibiotics at this time per ID  -Patient understands he has a very low threshold for infection/sepsis.  He understands he is to go to the emergency room immediately if he begins to experience fevers, chills, nausea or vomiting, or if he notices radiating erythema or purulent drainage from his wounds.    5. Postoperative wound dehiscence, subsequent encounter  6. Osteomyelitis of left lower extremity ([facility])  Comments: On 4/26/2022 patient underwent irrigation and debridement of multiple compartments of the left lower extremity states for pressure injury, with bone excision, and complex closure of chronic wound using biologic skin substitute.  During postop visit in surgeons office on 5/11, surgical site was noted to be dehisced.      1/29/2025: Skin remains intact  - Wound waxes and wanes due to pressure and underlying known chronic OM  -Patient to be mindful of offloading when supine, should assure that his leg is not rotating medially  -Monitor site each clinic visit  Wound care: Silicone foam dressing, Tubigrip D    7. Pressure injury of left foot, stage 4 ([facility])  8. Pressure injury of left leg, stage 3 ([facility])  9. Pressure injury of left heel, stage 3 ([facility])    1/29/2025: All of these wounds remain healed  -Monitor pressure points each clinic visit  - Patient aware of offloading.    10. Quadriplegia, C5-C7 incomplete ([facility])  Comments: Complicating factor.  Impaired mobility and sensation  -Patient is still spending 7-8 hours/day up in his  wheelchair and knows to reposition frequently.  Wears heel float boots bilaterally at all times  - Patient has new custom wheelchair seat. He had refitting and appears he was not sitting back in chair enough which was causing undue pressure to IT. He is more aware of the correct positioning in chair.    Please note that this note may have been created using voice recognition software. I have worked with technical experts from [facility] to optimize the interface.  I have made every reasonable attempt to correct obvious errors, but there may be errors of grammar and possibly content that I did not discover before finalizing the note.

## 2025-01-29 NOTE — PATIENT INSTRUCTIONS
-Keep dressings clean and dry. Change dressings every 3-4 days, and if they become over saturated, soiled or fall off.     -Should you experience any significant changes in your wound(s), such as signs of infection (increasing redness, swelling, localized heat, increased pain, fever > 101 F, chills) or have any questions regarding your home care instructions, please contact the wound center at [phone]. If after hours, contact your primary care physician or go to the hospital emergency room.     -If you are admitted to any hospital, you will need a new referral to come back to the wound clinic and any scheduled appointments that you already have, may be cancelled.     -If you are 5 or more minutes late for an appointment, we reserve the right to cancel and reschedule that appointment. Additionally, if you are habitually late or not showing (3 late cancellations and/or no shows), we reserve the right to cancel your remaining appointments and it will be your responsibility to obtain a new referral if services are still needed.

## 2025-01-30 NOTE — PROGRESS NOTES
Home health orders routed to [facility] via [employer].

## 2025-02-05 ENCOUNTER — OFFICE VISIT (OUTPATIENT)
Dept: WOUND CARE | Facility: MEDICAL CENTER | Age: 75
End: 2025-02-05
Payer: MEDICARE

## 2025-02-05 VITALS
SYSTOLIC BLOOD PRESSURE: 117 MMHG | TEMPERATURE: 98.1 F | DIASTOLIC BLOOD PRESSURE: 72 MMHG | OXYGEN SATURATION: 96 % | HEART RATE: 51 BPM | RESPIRATION RATE: 16 BRPM

## 2025-02-05 DIAGNOSIS — T81.31XD POSTOPERATIVE WOUND DEHISCENCE, SUBSEQUENT ENCOUNTER: ICD-10-CM

## 2025-02-05 DIAGNOSIS — L89.894 PRESSURE INJURY OF LEFT FOOT, STAGE 4 (HCC): ICD-10-CM

## 2025-02-05 DIAGNOSIS — L89.623 PRESSURE INJURY OF LEFT HEEL, STAGE 3 (HCC): ICD-10-CM

## 2025-02-05 DIAGNOSIS — M86.9 OSTEOMYELITIS OF LEFT LOWER EXTREMITY (HCC): ICD-10-CM

## 2025-02-05 DIAGNOSIS — M86.18 OTHER ACUTE OSTEOMYELITIS, OTHER SITE (HCC): ICD-10-CM

## 2025-02-05 DIAGNOSIS — L89.314 PRESSURE INJURY OF RIGHT ISCHIUM, STAGE 4 (HCC): ICD-10-CM

## 2025-02-05 DIAGNOSIS — L89.893 PRESSURE INJURY OF LEFT LEG, STAGE 3 (HCC): ICD-10-CM

## 2025-02-05 DIAGNOSIS — L89.324 PRESSURE INJURY OF LEFT ISCHIUM, STAGE 4 (HCC): ICD-10-CM

## 2025-02-05 DIAGNOSIS — G82.54 QUADRIPLEGIA, C5-C7, INCOMPLETE (HCC): ICD-10-CM

## 2025-02-05 DIAGNOSIS — L89.154 SACRAL DECUBITUS ULCER, STAGE IV (HCC): ICD-10-CM

## 2025-02-05 PROCEDURE — 3074F SYST BP LT 130 MM HG: CPT | Performed by: STUDENT IN AN ORGANIZED HEALTH CARE EDUCATION/TRAINING PROGRAM

## 2025-02-05 PROCEDURE — 11042 DBRDMT SUBQ TIS 1ST 20SQCM/<: CPT

## 2025-02-05 PROCEDURE — 11043 DBRDMT MUSC&/FSCA 1ST 20/<: CPT

## 2025-02-05 PROCEDURE — 3078F DIAST BP <80 MM HG: CPT | Performed by: STUDENT IN AN ORGANIZED HEALTH CARE EDUCATION/TRAINING PROGRAM

## 2025-02-05 PROCEDURE — 11043 DBRDMT MUSC&/FSCA 1ST 20/<: CPT | Performed by: STUDENT IN AN ORGANIZED HEALTH CARE EDUCATION/TRAINING PROGRAM

## 2025-02-05 NOTE — PATIENT INSTRUCTIONS
-Keep dressings clean and dry. Change dressings if they become over saturated, soiled or fall off. Otherwise, your home health nurse will change your dressings between wound clinic visits.    -On the days you are not changing your dressings, avoid getting the dressings wet. It is not harmful to get your wound wet when you are washing your wound before applying a new dressing.    -If you need to change your dressings at home: Wash your wound with normal saline, wound cleanser, or unscented soap and water prior to applying your new dressings. Please avoid cleansing with hydrogen peroxide or rubbing alcohol. Hydrogen peroxide and rubbing alcohol are toxic to new tissue and skin cells.    -Should you experience any significant changes in your wounds, such as signs of infection (increasing redness, swelling, localized heat, increased pain, fever > 101 F, chills) or have any questions regarding your home care instructions, please contact the wound center at [phone]. If after hours, contact your primary care physician or go to the hospital emergency room.     -If you are admitted to any hospital, you will need a new referral to come back to the wound clinic and any scheduled appointments that you already have, may be cancelled.     -If you are 5 or more minutes late for an appointment, we reserve the right to cancel and reschedule that appointment. Additionally, if you are habitually late or not showing (3 late cancellations and/or no shows), we reserve the right to cancel your remaining appointments and it will be your responsibility to obtain a new referral if services are still needed.

## 2025-02-06 NOTE — PROGRESS NOTES
Provider Encounter- Pressure Injury        HISTORY OF PRESENT ILLNESS  Wound History:    START OF CARE IN CLINIC: 1/31/2024 (return to clinic after hospitalization)    REFERRING PROVIDER: Racquel York       WOUNDS-superior coccyx pressure injury, stage IV-resolved                                 Coccyx pressure injury, stage IV-resolved           Inferior coccyx pressure injury, stage IV resolved                                 Right ischial pressure injury, stage IV                                 Left heel pressure injury, recurring stage III-resolved                                 Left posterior lower leg/calf-stage III-resolved                                 Left medial lower leg surgical wound dehiscence-resolved, reopens frequently-resolved            Left ischial pressure injury, stage IV-first observed in clinic 5/1/2024          HISTORY: Patient with history of incomplete quadriplegia referred to [facility] for treatment of a stage IV pressure injury.  He has a history of previous pressure injuries to this area, and underwent muscle flaps in 2019, and then again in 2020.  He was seen in the wound clinic in November 2021 for an ulcer proximal from his current ulcer, and pressure injuries to his left posterior lower leg and left heel.  At that time, it was discovered that the patient had retained VAC foam embedded in the wound bed of the sacral wound.  Attempts were made to get him back to his plastic surgeon, though unsuccessful.  In January he underwent surgical removal of VAC sponge along with excisional debridement of his sacral wound by Dr. Chaves.  After the surgery, his wound went on to heal without incident.   In early April 2022, his home health nurse noted a new sacral ulcer, below the previous ulcer which quickly tripled in size over the following weeks.  The ulcer to his left medial lower leg had also deteriorated, with bone visible at the base..  He was hospitalized from 4/22 until 4/27/2022 and  underwent surgery with Dr. Raman on 4/26 for irrigation and debridement of multiple compartments of the left lower extremity, bone excision, and complex closure of chronic wound using biologic skin substitute.   His sacrococcygeal wound was not surgically addressed during this admission.  He was discharged back to his group home, with home health, and referral to outpatient wound clinic for his sacral wound.  He was instructed to follow-up with his surgeon for his lower leg wound.       Postoperatively, the left medial lower leg incision dehisced.  He was seen by his surgeon at [facility] on 5/11.  The surgeon opted to leave remaining sutures in place, and refer him to the wound clinic for treatment of this wound.   Treatment of this wound was initiated in clinic on 5/12.  During this visit was also noted that his heel DTI had resolved, but that he had a new pressure injury to his left posterior lower extremity.     A new pressure injury was noted to patient's right upper buttock/lower back on 5/20/2022.  Wound was linear in shape, skin discolored but intact.     Abrasion noted to left anterior lower leg.  First observed in clinic on 7/22/2022.  Patient states he bumped his leg into his food tray.     Small DTI noted to patient's left lateral lower leg on 7/29/2022.  Skin intact but discolored.     Large area of deep tissue injury noted to patient's left exterior lower leg.  Patient denied any trauma to this area.  Skin intact.  Wound documented.    1/27/2023: Patient was admitted to [facility] from 1/23-1/25/2023 with gross hematuria. He underwent RICHARD which showed watchman device was in place and he was taken off of Xarelto. While hospitalized wound team was consulted. He was referred back to [facility] and home health upon discharge.    Patient was hospitalized at [facility] for pyelonephritis from 2/26 until 3/2/2023, admitted for fever and general malaise.  He was admitted and initially started on linezolid and meropenem for suspected  UTI and history of multidrug-resistant organisms.  Urine cultures were negative. ID was consulted, recommended CT of chest and abdomen,which were negative for acute findings. However, he was treated with 5 days total course of antibiotics for suspected UTI, and symptoms completely resolved.  During this admission, the inpatient wound team was consulted for treatment of his sacral and lower leg wounds.  A wound culture was taken from his left heel pressure ulcer, negative.  Once stabilized, he was discharged home and referred back to [facility] to resume treatment of his wounds.    Patient was hospitalized at [facility] from 12/11 until 12/23/2023, admitted for fever.  Wound infection suspected.  CT scan of abdomen and pelvis for evaluation of sacral pressure injury showed gas tracking down to the bone consistent with osteomyelitis.  He underwent I&D of right ischial ulcer (documented as buttock) with Dr. Bansal, medial tract leading to an abscess was identified.  Cavity was opened allowing it to drain into the main wound bed.  Wound VAC was placed and managed by wound team during this admission.  A bone scan of patient's left foot was also done, initially concerning for osteomyelitis.  Orthopedic surgery was consulted and did not recommend surgical intervention. ID consulted also, recommended the patient to receive IV ertapenem 1 g every 24 hours plus IV daptomycin 8 mg/kg every 24 hours through 1/22/2024.   He was discharged to [facility] on 1/23 for IV antibiotics and wound care.  From the LTAC he was discharged home on 1/22 with home health and referral back to [facility] to resume management of his wounds  .      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  Never used smokeless tobacco    Patient's problem list, allergies, and current medications reviewed and updated in Epic    Interval History:  Interval History thinned 7/29/2022.  Please see previous notes for complete interval history.   Interval History thinned 1/27/2023. Please see previous note for complete interval history.  Interval History thinned 3/3/2023.  Please see previous notes for complete interval history.    Interval History thinned 8/4/2023.  Please see previous notes for complete interval history.    Interval History thinned 1/31/2024.  Please see previous notes for complete interval history.    Interval History thinned 6/26/2024.  Please see previous notes for complete interval history.      6/19/2024: Clinic visit with Steve Hodges MD. Patient denies any fevers or chills. Reports he is tolerating Abx. He has appointment with ID later today to discuss OM treatment. He is tolerating wound VAC. Slight bone exposed bilaterally in ischial wounds, slightly worse.    6/26/2024 : Clinic visit with ADILSON Arthur, FNPEGGY-BC, CWDINESHN, CFTRACI.   Patient presents today with significant deterioration of his both ischial wounds, with increased depth of undermining.   He now has a PICC line, and will be starting outpatient infusions of ertapenem later today.  He states he is feeling well, denies fevers, chills, nausea, vomiting, cough or shortness of breath.  Due to deterioration of his wound, we did discuss possibly having him go over the hospital for surgical debridement and IV antibiotics.  He was hesitant to do so.  We agreed to see how he looks after a week or so IV antibiotics.  He is agreeable to minimizing time up in his wheelchair.  He also agrees to go to the emergency room immediately if he develops any signs or symptoms of infection.    7/10/2024: Clinic visit with Steve Hodges MD. Patient reports feeling in normal state of health. He missed  last appointment as he had fall out of wheelchair and was evaluated in ED. He denies any injuries from fall. Patient wounds are measuring slightly smaller. He continues on Ertapenem. Patient reports that he has appointment for seating evaluation from [facility] scheduled, but does not recall the date.    7/17/2024 : Clinic visit with ADILSON Arthur, HOLDEN, LILIYA VALERIO.   Anjum states he is feeling well.  Left ischial wound measures significantly smaller today, however measurements vary somewhat depending on pt's position.  Both wounds appear to be progressing slightly, with improving tissue quality.    7/24/2024 : Clinic visit with ADILSON Arthur, HOLDEN, IMAN, LILIYA.   Patient continues to feel well, offers no complaints.  Right ischial wound measures smaller, left ischial wound is larger.  Patient tolerating VAC without any difficulty.  Home health continues to see him in between clinic visits.  He is still receiving daily infusions of IV antibiotics, will be completing these in August.    7/31/2024 : Clinic visit with ADILSON Arthur, HOLDEN, IMAN, LILIYA.   Anjum continues to feel well, his wounds are slowly progressing.  Tolerating VAC.  He is on IV antibiotics for 1 more week.  Admits that he is up in his wheelchair for 10 to 14 hours/day.    8/7/2024 : Clinic visit with ADILSON Arthur FNP-BC, LILIYA VALERIO.   Anjum states he is feeling well today, offers no complaints.  Both wounds measure a bit smaller today, new granulation tissue noted.  He will be getting his last IV antibiotic infusion today.    8/14/2024 : Clinic visit with ADILSON Arthur FNP-BC, IMAN, LILIYA.   Patient continues to feel well.  He has completed his antibiotics, followed up with ID earlier this week, no further antibiotic therapy at this time.  Ischial wounds are slowly progressing.  Lower extremity wounds remain resolved.    8/21/2024 : Clinic visit with ADILSON Arthur FNP-BC, LILIYA VALERIO.   Anjum states he is feeling  well, offers no complaints.  His wounds are slowly progressing, increased granulation.    8/28/2024 : Clinic visit with ADILSON Arthur, HOLDEN, LILIYA VALERIO.   Turk states he is feeling well overall.  His wounds measure slightly smaller.  He has had some urinary symptoms, reports leakage from around his suprapubic catheter last night, and excessively full urine bag.  He has been in contact with his urologist office.  He also mentions that he has a recurring small scab to his nose, states that it falls off only to return shortly after.  This has been going on for several months.  I offered to refer him to dermatology.    9/11/2024 : Clinic visit with ADILSON Arthur, HOLDEN, LILIYA VALERIO.   Turk states he is feeling well.  He missed his appointment last week due to car troubles.  His vehicle is now running well.  Ischial wounds show some improvement, increased granulation tissue.  He does have a small reopening of left posterior lower leg wound, appears to be a small abrasion over area of scar tissue.    9/18/2024: Clinic visit with Steve Hodges MD. Patient reports doing ok. Denies any acute issues with wound VAC. Reports that he was fitted by [employer] for new seat cushion and is being manufactured, he is not sure when will be ready. Patient's wounds appears slightly improved. Left posterior leg wound remains open.    9/25/2024 : Clinic visit with ADILSON Arthur, HOLDEN, IMAN, LILIYA.   Patient states he is feeling well.  His wounds measure about the same.    10/2/2024: Clinic visit with HOLDEN Johnson CWON, LILIYA.  Pt denies fevers, chills, nausea, vomiting. Bilateral ischial ulcers increased in area.  Left IT quality overall worse compared to right IT.  Recommend Dakin's soak.  Continue with VAC to bilateral IT.    10/9/2024 : Clinic visit with ADILSON Arthur, HOLDEN, IMAN, LILIYA.   Patient continues to feel well overall.  His wounds measure about the same, no evidence of infection.   He does have some minor breakdown over the scar tissue of his sacrum which bears watching.  He has not heard from [employer] about his seat cushion, states he will contact them again.    10/16/2024 : Clinic visit with ADILSON Arthur, HOLDEN, IMAN, LILIYA.   Anjum continues to feel well.  His wounds measure slightly smaller.  Sacral wound just slightly open.  He called [employer] earlier this week, was told his new cushion is ready, and that they would deliver it next Monday.  He also states he is due for a new wheelchair, but has not yet become process.      10/23/2024 : Clinic visit with ADILSON Arthur, HOLDEN, IMAN, LILIYA.   Anjum's wounds present today with significantly more odor.  He states he is feeling fine, denies fevers, chills, nausea, vomiting, cough or shortness of breath.  Increased drainage noted.  Wounds measure about the same.  Culture collected in clinic today after debridement.  VAC placed on hold.  Wounds packed with Puracyn moistened silver Hydrofiber.   Patient reminded that he is to go to the emergency room if he notices any increased redness, swelling, drainage or odor, or if he develops fever, chills, nausea or vomiting.    He has a new cushion to his wheelchair.  States that he does not yet have a new wheelchair, will be picking on out the next few weeks.    10/30/2024 : Clinic visit with ADILSON Arthur, HOLDEN, IMAN, LILIYA.   Anjum states he is feeling well.  Wound odor still noticeable.  Culture collected last visit was positive for light growth of Proteus.  Given continued odor, will go ahead and prescribe short course of Augmentin, no other p.o. options available based on sensitivities.  Continue with Dakin's wound cleansing.  Home health to restart VAC on Friday 11/6/2024: Clinic visit with Steve Hodges MD. Patient reports doing well, denies any acute issues. Tolerating augmentin well without side effects. Odor much improved today. Wounds are improving slowly. Continue wound  VAC.    11/14/2024: Clinic visit with Steve Hodges MD. Patient reports doing ok. He was frustrated coming into clinic, was having trouble exiting his van. No evidence of wound infection today    11/20/2024: Clinic visit with Steve Hodges MD. Patient reports feeling in normal state of health. His ischial wounds stable and slowly improving. He has reopened sacral wound however. Denies any signs or symptoms of infection.    11/27/2024: Clinic visit with Steve Hodges MD. Patient reports doing well, denies any acute issues. Sacral wound measuring smaller. Right ischial wound smaller. Left ischial wound larger with increased slough and necrotic tissue at base of wound. Patient has contacted [employer] technician to evaluate seat due to reopening of sacrum, he is pending call back.    12/4/2024: Clinic visit with Steve Hodges MD. Patient reports doing well, denies any signs or symptoms of infection. Denies any issues with wound VAC. Sacrum has resolved. Right ischium wound larger with necrotic tissue, left ischium stable. He denies any traumatic events or any changes to his routine that would have increased pressure on right ischium besides time spent in chair during thanksgiving with family.    12/11/2024: Clinic visit with Steve Hodges MD. Patient reports doing well, denies any acute issues. Wounds are stable. No further necrosis of right ischium. Patient denies any signs or symptoms of infection.    12/18/2024: Clinic visit with Steve Hodges MD. Patient reports doing ok. Reports was diagnosed with UTI by urology, picking up Abx later today, thinks that he was prescribed Augmentin. Right ischial wound measuring larger, dusky tissue concerning for increased pressure. He reports that he has been up in wheelchair more this week with friend in town and has not been using tilt feature of chair as frequently. He was encouraged to spend more time offloading.   Due to holiday's, and dressings only going to be  changed 2x weekly, it is medically necessary to continue wound VAC for now as it would be severely problematic for patient to be sitting with saturated dressings during this period.    1/8/2025: Clinic visit with Steve Hodges MD. Patient reports chest congestion with low grade fevers for last few days. Denies any issues with wounds. He has increased necrosis of left IT pressure injury, he denies any changes in activity and is using tilt feature in chair every 30 min. Patient reports that he has appointment with [facility] next Friday to re-evaluate custom seat.    1/15/2025: Clinic visit with Steve Hodges MD. Patient returns to clinic following hospitalization for mycoplasma pneumonia.  Patient reports that he is feeling better denies any fevers or chills currently.  Patient has upcoming seating eval by [employer].  Wound VAC has been held since last week, wound seem to be slightly improved with holding VAC. Will continue to hold this this week.    1/22/2025: Clinic visit with Steve Hodges MD. Patient reports doing well, denies any acute issues. He had seat cushion re-evaluation and the technician noted that he had no hot spots when he was back far enough in chair, but if he slides forward then he is putting pressure on ischial tuberosities which explains the concern for pressure noted last few weeks. Patient will keep wheelchair slightly tilted back. Wounds with increased granulation tissue. Discontinue wound VAC and he will return to .    1/29/2025: Clinic visit with Steve Hodges MD. Patient reports doing ok, denies any signs or symptoms of infection. Patient assures me that he is sitting further back in chair as recommended by PT/wheel chair technician. Wounds are slowly improving, he reports less drainage. Will trial use of Hydrofiber instead of Enluxtra.    2/5/2025: Clinic visit with Steve Hodges MD. Patient reports doing well. Wounds are not overly macerated after holding Enluxtra,  continue hydrofiber.    REVIEW OF SYSTEMS:   Unchanged from previous wound clinic assessment on 1/29/2025, except as noted in interval history above.      PHYSICAL EXAMINATION:   /72   Pulse (!) 51 Comment: RN notified   Temp 36.7 °C (98.1 °F) (Temporal)   Resp 16   SpO2 96%   Physical Exam  Constitutional:       Appearance: He is obese.   Cardiovascular:      Rate and Rhythm: Normal rate.   Pulmonary:      Effort: Pulmonary effort is normal.   Abdominal:      Comments: Colostomy left lower quadrant   Genitourinary:     Comments: Suprapubic catheter to down drain   Skin:     Comments: Stage IV pressure injury to right ischium: Wound improving, measuring smaller, good granulation tissue formation. Thin layer of slough.    Stage IV pressure injury of left ischium: Wound stable, measurements positional. Slight increase in granulation tissue. Thin layer of slough. No evidence of infection.    Stage IV pressure injury sacrum: Remains resolved    All other wounds remain healed, high risk for recurrence     Neurological:      Mental Status: He is alert and oriented to person, place, and time.   Psychiatric:         Mood and Affect: Mood normal.         WOUND ASSESSMENT  Wound 12/12/23 Pressure Injury Ischium Right (Active)   Wound Image    02/05/25 1530   Site Assessment Pink;Red;Yellow 02/05/25 1530   Periwound Assessment Maceration 02/05/25 1530   Margins Unattached edges 02/05/25 1530   Closure Secondary intention 10/16/24 1700   Drainage Amount Large 02/05/25 1530   Drainage Description Serosanguineous 02/05/25 1530   Treatments Cleansed;Provider debridement 02/05/25 1530   Offloading/DME Other (comment) 02/05/25 1530   Wound Cleansing Hypochlorus Acid 02/05/25 1530   Periwound Protectant No-sting Skin Prep;Barrier Paste 02/05/25 1530   Dressing Status Intact;Old drainage 10/02/24 1500   Dressing Changed Changed 01/22/25 1500   Dressing Cleansing/Solutions Normal Saline 02/05/25 1530   Dressing Options  Hydrofera Blue Classic;Hydrofiber;Offloading Dressing - Sacral 02/05/25 1530   Dressing Change/Treatment Frequency Monday, Wednesday, Friday, and As Needed 02/05/25 1530   Wound Team Following Weekly 10/16/24 1700   WOUND NURSE ONLY - Pressure Injury Stage 4 02/05/25 1530   Wound Length (cm) 4.2 cm 02/05/25 1530   Wound Width (cm) 2.3 cm 02/05/25 1530   Wound Depth (cm) 0.5 cm 02/05/25 1530   Wound Surface Area (cm^2) 9.66 cm^2 02/05/25 1530   Wound Volume (cm^3) 4.83 cm^3 02/05/25 1530   Post-Procedure Length (cm) 4.3 cm 02/05/25 1530   Post-Procedure Width (cm) 2.3 cm 02/05/25 1530   Post-Procedure Depth (cm) 0.5 cm 02/05/25 1530   Post-Procedure Surface Area (cm^2) 9.89 cm^2 02/05/25 1530   Post-Procedure Volume (cm^3) 4.945 cm^3 02/05/25 1530   Wound Healing % 91 02/05/25 1530   Tunneling (cm) 0 cm 02/05/25 1530   Undermining (cm) 1.8 cm 02/05/25 1530   Undermining of Wound, 1st Location From 6 o'clock;To 8 o'clock 02/05/25 1530   Undermining (cm) - 2nd location 0 cm 01/08/25 1640   Undermining of Wound, 2nd Location From 12 o'clock;To 1 o'clock 12/04/24 1615   Wound Odor None 02/05/25 1530   Exposed Structures None 02/05/25 1530   Number of days: 421       Wound 05/01/24 Pressure Injury Ischium Left (Active)   Wound Image    02/05/25 1530   Site Assessment Pink;Red;Yellow 02/05/25 1530   Periwound Assessment Maceration 02/05/25 1530   Margins Unattached edges 02/05/25 1530   Closure Secondary intention 09/18/24 1500   Drainage Amount Large 02/05/25 1530   Drainage Description Serosanguineous 02/05/25 1530   Treatments Cleansed;Provider debridement 02/05/25 1530   Offloading/DME Other (comment) 02/05/25 1530   Wound Cleansing Hypochlorus Acid 02/05/25 1530   Periwound Protectant No-sting Skin Prep;Barrier Paste 02/05/25 1530   Dressing Changed Changed 02/05/25 1530   Dressing Cleansing/Solutions Normal Saline 02/05/25 1530   Dressing Options Hydrofera Blue Classic;Hydrofiber;Offloading Dressing - Sacral 02/05/25  1530   Dressing Change/Treatment Frequency Monday, Wednesday, Friday, and As Needed 02/05/25 1530   Wound Team Following Weekly 12/18/24 1700   WOUND NURSE ONLY - Pressure Injury Stage 4 02/05/25 1530   Non-staged Wound Description Not applicable 09/18/24 1600   Wound Length (cm) 4.5 cm 02/05/25 1530   Wound Width (cm) 1.2 cm 02/05/25 1530   Wound Depth (cm) 1.2 cm 02/05/25 1530   Wound Surface Area (cm^2) 5.4 cm^2 02/05/25 1530   Wound Volume (cm^3) 6.48 cm^3 02/05/25 1530   Post-Procedure Length (cm) 4.6 cm 02/05/25 1530   Post-Procedure Width (cm) 1.2 cm 02/05/25 1530   Post-Procedure Depth (cm) 1.2 cm 02/05/25 1530   Post-Procedure Surface Area (cm^2) 5.52 cm^2 02/05/25 1530   Post-Procedure Volume (cm^3) 6.624 cm^3 02/05/25 1530   Wound Healing % -2845 02/05/25 1530   Tunneling (cm) 0 cm 02/05/25 1530   Undermining (cm) 0 cm 02/05/25 1530   Undermining of Wound, 1st Location From 4 o'clock;To 10 o'clock 12/18/24 1700   Wound Odor None 02/05/25 1530   Exposed Structures None 02/05/25 1530   Number of days: 280       Wound 01/09/25 Other (comment) Coccyx Left;Right (Active)   Number of days: 27       Wound 01/09/25 Pressure Injury Sacrum (Active)   Number of days: 27       Wound 01/09/25 Other (comment) Toe, 3rd;Toe, 4th Left (Active)   Number of days: 27       PROCEDURE: Excisional debridement of right and left ischial wounds  -2% viscous lidocaine applied topically to wound bed for approximately 5 minutes prior to debridement  -Curette used to debride wound bed.  Excisional debridement was performed to remove devitalized tissue until healthy, bleeding tissue was visualized.  Total area debrided was approximately 15.41 cm², including into undermined areas of wounds.  Tissue debrided into the muscle / fascia layer.    -Bleeding controlled with manual pressure.    -Wound care completed by wound RN, refer to flowsheet  -Patient tolerated the procedure well, without c/o pain or discomfort.    Pertinent Labs and  "Diagnostics:    Labs:     A1c: No results found for: \"HBA1C\"     [facility] results, 7/1/2022 (under media tab)    CRP 13    ESR 31      IMAGING:     X-ray left tib-fib ordered 2/16/2024 through [facility]    12/11/2023-CT of abdomen pelvis with contrast  IMPRESSION:   1.  Right ischial decubitus ulcer extending to bone with soft tissue gas tracking along the right perineum. Appearance suggesting osteomyelitis, consider component of necrotizing fasciitis as clinically appropriate.  2.  Small pericardial effusion  3.  Left adrenal nodule, density on prior noncontrast CT demonstrates adenoma.  4.  Hepatomegaly  5.  Enlarged prostate, workup and evaluation for causes of prostate enlargement recommended as clinically appropriate.  6.  Atherosclerosis and atherosclerotic coronary artery disease    12/15/2023-bone scan of left foot  IMPRESSION:     1.  Mild increased activity in the LEFT 1st and 3rd toes on blood pool and delayed images possibly indicating inflammation/infection.  2.  No significant blood flow asymmetry.          VASCULAR STUDIES: No results found.    LAST  WOUND CULTURE:   Lab Results   Component Value Date/Time    CULTRSULT  01/10/2025 04:45 PM     Moderate growth usual upper respiratory ade  No clinically significant Staphylococcus aureus, Methicillin  Resistant Staphylococcus aureus, or Pseudomonas species  isolated.        PATHOLOGY  2/17/2023-bone fragment extracted from left lower extremity wound\\  FINAL DIAGNOSIS:     A. Left leg bone fragment at base of chronic wound:          Extensively degenerated fibrocartilaginous tissue with a rim of           fibrinopurulent debris          Correlate with culture findings            Comment: While no residual intact bone is identified, these           findings are suggestive of adjacent osteomyelitis.      ASSESSMENT AND PLAN:     1. Sacral decubitus ulcer, stage IV ([facility])  Comments: Ulcer first noted in early April 2022 as small open area which " quickly enlarged.  This ulcer is present distal from previous sacral ulcer which healed after surgery in January.  Patient has history of flap reconstruction x2 to this area.    2/5/2025: Wound remains resolved  - Continue to protect area with pressure relieving sacral foam dressing.  -Patient does spend a lot of time up in his wheelchair, and wishes to continue to do so for his quality of life.  He lives independently, drives, and is involved with family activities.    -His presents today with a new cushion in his wheelchair.  Has yet to pick out a new wheelchair  - Known OM that was previously treated. CT scan done during recent hospitalization did not show OM of sacrum, however OM of the ischium noted.  -Patient is very well versed in pressure relief strategies    Wound care: silicone adhesive foam dressing    2. Pressure injury of right ischium, stage 4 ([facility])  Comments: Abscess and OM found on CT during hospitalization in December 2023.  Patient underwent I&D with VAC placement.  IV antibiotics through 1/22/2024.    2/5/2025: Wound improving  - Excisional debridement of nonviable tissue from wound in clinic today, medically necessary to promote wound healing  - VAC discontinued previously.  -Patient to return to clinic weekly for assessment, debridement, and dressing change.    -Home health to change dressing 2 times per week in between clinic visits.    -Patient is very well versed on pressure relief measures, has adequate surface on bed, alternating low air loss.    Wound care: Hydrofera Blue Classic, Hydrofiber, Silicone foam    3. Pressure injury of left ischium, stage 4 ([facility])    2/5/2025: Wound improving  - Excisional debridement of wound in clinic today, medically necessary to promote wound healing.  - Patient to return to clinic weekly for assessment, debridement, and dressing change  - Discontinue wound VAC for now  -Home health to change dressing 2 times per week in between clinic visits.    -Patient  has new cushion in his wheelchair, see above  -Patient is very well versed on pressure relief measures, has adequate surface on bed, alternating low air loss.    Wound care: Hydrofera Blue Classic, Hydrofiber, Silicone foam    4. Other acute osteomyelitis, other site ([facility])    2/5/2025: Bone fragments removed during clinic visit in June were sent for pathology, polymicrobial, most concerning was an MDR ESBL Proteus.   -Patient completed IV antibiotics.  No further antibiotics at this time per ID  -Patient understands he has a very low threshold for infection/sepsis.  He understands he is to go to the emergency room immediately if he begins to experience fevers, chills, nausea or vomiting, or if he notices radiating erythema or purulent drainage from his wounds.    5. Postoperative wound dehiscence, subsequent encounter  6. Osteomyelitis of left lower extremity ([facility])  Comments: On 4/26/2022 patient underwent irrigation and debridement of multiple compartments of the left lower extremity states for pressure injury, with bone excision, and complex closure of chronic wound using biologic skin substitute.  During postop visit in surgeons office on 5/11, surgical site was noted to be dehisced.      2/5/2025: Skin remains intact  - Wound waxes and wanes due to pressure and underlying known chronic OM  -Patient to be mindful of offloading when supine, should assure that his leg is not rotating medially  -Monitor site each clinic visit  Wound care: Silicone foam dressing, Tubigrip D    7. Pressure injury of left foot, stage 4 ([facility])  8. Pressure injury of left leg, stage 3 ([facility])  9. Pressure injury of left heel, stage 3 ([facility])    2/5/2025: All of these wounds remain healed  -Monitor pressure points each clinic visit  - Patient aware of offloading.    10. Quadriplegia, C5-C7 incomplete ([facility])  Comments: Complicating factor.  Impaired mobility and sensation  -Patient is still spending 7-8 hours/day up in his wheelchair and knows  to reposition frequently.  Wears heel float boots bilaterally at all times  - Patient has new custom wheelchair seat. He had refitting and appears he was not sitting back in chair enough which was causing undue pressure to IT. He is more aware of the correct positioning in chair.    Please note that this note may have been created using voice recognition software. I have worked with technical experts from [facility] to optimize the interface.  I have made every reasonable attempt to correct obvious errors, but there may be errors of grammar and possibly content that I did not discover before finalizing the note.

## 2025-02-11 ENCOUNTER — HOSPITAL ENCOUNTER (OUTPATIENT)
Facility: MEDICAL CENTER | Age: 75
End: 2025-02-11
Attending: STUDENT IN AN ORGANIZED HEALTH CARE EDUCATION/TRAINING PROGRAM
Payer: MEDICARE

## 2025-02-11 PROCEDURE — 87086 URINE CULTURE/COLONY COUNT: CPT

## 2025-02-12 ENCOUNTER — OFFICE VISIT (OUTPATIENT)
Dept: WOUND CARE | Facility: MEDICAL CENTER | Age: 75
End: 2025-02-12
Payer: MEDICARE

## 2025-02-12 DIAGNOSIS — M86.18 OTHER ACUTE OSTEOMYELITIS, OTHER SITE (HCC): ICD-10-CM

## 2025-02-12 DIAGNOSIS — L89.314 PRESSURE INJURY OF RIGHT ISCHIUM, STAGE 4 (HCC): ICD-10-CM

## 2025-02-12 DIAGNOSIS — G82.54 QUADRIPLEGIA, C5-C7, INCOMPLETE (HCC): ICD-10-CM

## 2025-02-12 DIAGNOSIS — L89.324 PRESSURE INJURY OF LEFT ISCHIUM, STAGE 4 (HCC): ICD-10-CM

## 2025-02-12 DIAGNOSIS — L89.154 SACRAL DECUBITUS ULCER, STAGE IV (HCC): ICD-10-CM

## 2025-02-12 PROCEDURE — 11043 DBRDMT MUSC&/FSCA 1ST 20/<: CPT | Performed by: STUDENT IN AN ORGANIZED HEALTH CARE EDUCATION/TRAINING PROGRAM

## 2025-02-12 PROCEDURE — 11042 DBRDMT SUBQ TIS 1ST 20SQCM/<: CPT

## 2025-02-12 PROCEDURE — 11043 DBRDMT MUSC&/FSCA 1ST 20/<: CPT

## 2025-02-13 LAB
BACTERIA UR CULT: NORMAL
SIGNIFICANT IND 70042: NORMAL
SITE SITE: NORMAL
SOURCE SOURCE: NORMAL

## 2025-02-13 NOTE — PROGRESS NOTES
Provider Encounter- Pressure Injury        HISTORY OF PRESENT ILLNESS  Wound History:    START OF CARE IN CLINIC: 1/31/2024 (return to clinic after hospitalization)    REFERRING PROVIDER: Racquel York       WOUNDS-superior coccyx pressure injury, stage IV-resolved                                 Coccyx pressure injury, stage IV-resolved           Inferior coccyx pressure injury, stage IV resolved                                 Right ischial pressure injury, stage IV                                 Left heel pressure injury, recurring stage III-resolved                                 Left posterior lower leg/calf-stage III-resolved                                 Left medial lower leg surgical wound dehiscence-resolved, reopens frequently-resolved            Left ischial pressure injury, stage IV-first observed in clinic 5/1/2024          HISTORY: Patient with history of incomplete quadriplegia referred to [facility] for treatment of a stage IV pressure injury.  He has a history of previous pressure injuries to this area, and underwent muscle flaps in 2019, and then again in 2020.  He was seen in the wound clinic in November 2021 for an ulcer proximal from his current ulcer, and pressure injuries to his left posterior lower leg and left heel.  At that time, it was discovered that the patient had retained VAC foam embedded in the wound bed of the sacral wound.  Attempts were made to get him back to his plastic surgeon, though unsuccessful.  In January he underwent surgical removal of VAC sponge along with excisional debridement of his sacral wound by Dr. Chaves.  After the surgery, his wound went on to heal without incident.   In early April 2022, his home health nurse noted a new sacral ulcer, below the previous ulcer which quickly tripled in size over the following weeks.  The ulcer to his left medial lower leg had also deteriorated, with bone visible at the base..  He was hospitalized from 4/22 until 4/27/2022 and  underwent surgery with Dr. Raman on 4/26 for irrigation and debridement of multiple compartments of the left lower extremity, bone excision, and complex closure of chronic wound using biologic skin substitute.   His sacrococcygeal wound was not surgically addressed during this admission.  He was discharged back to his group home, with home health, and referral to outpatient wound clinic for his sacral wound.  He was instructed to follow-up with his surgeon for his lower leg wound.       Postoperatively, the left medial lower leg incision dehisced.  He was seen by his surgeon at [facility] on 5/11.  The surgeon opted to leave remaining sutures in place, and refer him to the wound clinic for treatment of this wound.   Treatment of this wound was initiated in clinic on 5/12.  During this visit was also noted that his heel DTI had resolved, but that he had a new pressure injury to his left posterior lower extremity.     A new pressure injury was noted to patient's right upper buttock/lower back on 5/20/2022.  Wound was linear in shape, skin discolored but intact.     Abrasion noted to left anterior lower leg.  First observed in clinic on 7/22/2022.  Patient states he bumped his leg into his food tray.     Small DTI noted to patient's left lateral lower leg on 7/29/2022.  Skin intact but discolored.     Large area of deep tissue injury noted to patient's left exterior lower leg.  Patient denied any trauma to this area.  Skin intact.  Wound documented.    1/27/2023: Patient was admitted to [facility] from 1/23-1/25/2023 with gross hematuria. He underwent RICHARD which showed watchman device was in place and he was taken off of Xarelto. While hospitalized wound team was consulted. He was referred back to [facility] and home health upon discharge.    Patient was hospitalized at [facility] for pyelonephritis from 2/26 until 3/2/2023, admitted for fever and general malaise.  He was admitted and initially started on linezolid and meropenem for suspected  UTI and history of multidrug-resistant organisms.  Urine cultures were negative. ID was consulted, recommended CT of chest and abdomen,which were negative for acute findings. However, he was treated with 5 days total course of antibiotics for suspected UTI, and symptoms completely resolved.  During this admission, the inpatient wound team was consulted for treatment of his sacral and lower leg wounds.  A wound culture was taken from his left heel pressure ulcer, negative.  Once stabilized, he was discharged home and referred back to [facility] to resume treatment of his wounds.    Patient was hospitalized at [facility] from 12/11 until 12/23/2023, admitted for fever.  Wound infection suspected.  CT scan of abdomen and pelvis for evaluation of sacral pressure injury showed gas tracking down to the bone consistent with osteomyelitis.  He underwent I&D of right ischial ulcer (documented as buttock) with Dr. Bansal, medial tract leading to an abscess was identified.  Cavity was opened allowing it to drain into the main wound bed.  Wound VAC was placed and managed by wound team during this admission.  A bone scan of patient's left foot was also done, initially concerning for osteomyelitis.  Orthopedic surgery was consulted and did not recommend surgical intervention. ID consulted also, recommended the patient to receive IV ertapenem 1 g every 24 hours plus IV daptomycin 8 mg/kg every 24 hours through 1/22/2024.   He was discharged to [facility] on 1/23 for IV antibiotics and wound care.  From the LTAC he was discharged home on 1/22 with home health and referral back to [facility] to resume management of his wounds  .      Pertinent Medical History: Incomplete quadriplegia, history of stage IV pressure injuries, history of flap procedures to sacral pressure injuries, osteomyelitis, obesity, colostomy in place   Contributing factors: Immobility and Obesity, impaired sensation    Personal support: Attendant-staff at care home and home health  nursing    TOBACCO USE:   Former smoker, quit in 1977.  Never used smokeless tobacco    Patient's problem list, allergies, and current medications reviewed and updated in Epic    Interval History:  Interval History thinned 7/29/2022.  Please see previous notes for complete interval history.   Interval History thinned 1/27/2023. Please see previous note for complete interval history.  Interval History thinned 3/3/2023.  Please see previous notes for complete interval history.    Interval History thinned 8/4/2023.  Please see previous notes for complete interval history.    Interval History thinned 1/31/2024.  Please see previous notes for complete interval history.    Interval History thinned 6/26/2024.  Please see previous notes for complete interval history.      6/19/2024: Clinic visit with Steve Hodges MD. Patient denies any fevers or chills. Reports he is tolerating Abx. He has appointment with ID later today to discuss OM treatment. He is tolerating wound VAC. Slight bone exposed bilaterally in ischial wounds, slightly worse.    6/26/2024 : Clinic visit with ADILSON Arthur, FNPEGGY-BC, CWDINESHN, CFTRACI.   Patient presents today with significant deterioration of his both ischial wounds, with increased depth of undermining.   He now has a PICC line, and will be starting outpatient infusions of ertapenem later today.  He states he is feeling well, denies fevers, chills, nausea, vomiting, cough or shortness of breath.  Due to deterioration of his wound, we did discuss possibly having him go over the hospital for surgical debridement and IV antibiotics.  He was hesitant to do so.  We agreed to see how he looks after a week or so IV antibiotics.  He is agreeable to minimizing time up in his wheelchair.  He also agrees to go to the emergency room immediately if he develops any signs or symptoms of infection.    7/10/2024: Clinic visit with Steve Hodges MD. Patient reports feeling in normal state of health. He missed  last appointment as he had fall out of wheelchair and was evaluated in ED. He denies any injuries from fall. Patient wounds are measuring slightly smaller. He continues on Ertapenem. Patient reports that he has appointment for seating evaluation from [facility] scheduled, but does not recall the date.    7/17/2024 : Clinic visit with ADILSON Arthur, HOLDEN, LILIYA VALERIO.   Anjum states he is feeling well.  Left ischial wound measures significantly smaller today, however measurements vary somewhat depending on pt's position.  Both wounds appear to be progressing slightly, with improving tissue quality.    7/24/2024 : Clinic visit with ADILSON Arthur, HOLDEN, IMAN, LILIYA.   Patient continues to feel well, offers no complaints.  Right ischial wound measures smaller, left ischial wound is larger.  Patient tolerating VAC without any difficulty.  Home health continues to see him in between clinic visits.  He is still receiving daily infusions of IV antibiotics, will be completing these in August.    7/31/2024 : Clinic visit with ADILSON Arthur, HOLDEN, IMAN, LILIYA.   Anjum continues to feel well, his wounds are slowly progressing.  Tolerating VAC.  He is on IV antibiotics for 1 more week.  Admits that he is up in his wheelchair for 10 to 14 hours/day.    8/7/2024 : Clinic visit with ADILSON Arthur FNP-BC, LILIYA VALERIO.   Anjum states he is feeling well today, offers no complaints.  Both wounds measure a bit smaller today, new granulation tissue noted.  He will be getting his last IV antibiotic infusion today.    8/14/2024 : Clinic visit with ADILSON Arthur FNP-BC, IMAN, LILIYA.   Patient continues to feel well.  He has completed his antibiotics, followed up with ID earlier this week, no further antibiotic therapy at this time.  Ischial wounds are slowly progressing.  Lower extremity wounds remain resolved.    8/21/2024 : Clinic visit with ADILSON Arthur FNP-BC, LILIYA VALERIO.   Anjum states he is feeling  well, offers no complaints.  His wounds are slowly progressing, increased granulation.    8/28/2024 : Clinic visit with ADILSON Arthur, HOLDEN, LILIYA VALERIO.   Turk states he is feeling well overall.  His wounds measure slightly smaller.  He has had some urinary symptoms, reports leakage from around his suprapubic catheter last night, and excessively full urine bag.  He has been in contact with his urologist office.  He also mentions that he has a recurring small scab to his nose, states that it falls off only to return shortly after.  This has been going on for several months.  I offered to refer him to dermatology.    9/11/2024 : Clinic visit with ADILSON Arthur, HOLDEN, LILIYA VALERIO.   Turk states he is feeling well.  He missed his appointment last week due to car troubles.  His vehicle is now running well.  Ischial wounds show some improvement, increased granulation tissue.  He does have a small reopening of left posterior lower leg wound, appears to be a small abrasion over area of scar tissue.    9/18/2024: Clinic visit with Steve Hodges MD. Patient reports doing ok. Denies any acute issues with wound VAC. Reports that he was fitted by [employer] for new seat cushion and is being manufactured, he is not sure when will be ready. Patient's wounds appears slightly improved. Left posterior leg wound remains open.    9/25/2024 : Clinic visit with ADILSON Arthur, HOLDEN, IMAN, LILIYA.   Patient states he is feeling well.  His wounds measure about the same.    10/2/2024: Clinic visit with HOLDEN Johnson CWON, LILIYA.  Pt denies fevers, chills, nausea, vomiting. Bilateral ischial ulcers increased in area.  Left IT quality overall worse compared to right IT.  Recommend Dakin's soak.  Continue with VAC to bilateral IT.    10/9/2024 : Clinic visit with ADILSON Arthur, HOLDEN, IMAN, LILIYA.   Patient continues to feel well overall.  His wounds measure about the same, no evidence of infection.   He does have some minor breakdown over the scar tissue of his sacrum which bears watching.  He has not heard from [employer] about his seat cushion, states he will contact them again.    10/16/2024 : Clinic visit with ADILSON Arthur, HOLDEN, IMAN, LILIYA.   Anjum continues to feel well.  His wounds measure slightly smaller.  Sacral wound just slightly open.  He called [employer] earlier this week, was told his new cushion is ready, and that they would deliver it next Monday.  He also states he is due for a new wheelchair, but has not yet become process.      10/23/2024 : Clinic visit with ADILSON Arthur, HOLDEN, IMAN, LILIYA.   Anjum's wounds present today with significantly more odor.  He states he is feeling fine, denies fevers, chills, nausea, vomiting, cough or shortness of breath.  Increased drainage noted.  Wounds measure about the same.  Culture collected in clinic today after debridement.  VAC placed on hold.  Wounds packed with Puracyn moistened silver Hydrofiber.   Patient reminded that he is to go to the emergency room if he notices any increased redness, swelling, drainage or odor, or if he develops fever, chills, nausea or vomiting.    He has a new cushion to his wheelchair.  States that he does not yet have a new wheelchair, will be picking on out the next few weeks.    10/30/2024 : Clinic visit with ADILSON Arthur, HOLDEN, IMAN, LILIYA.   Anjum states he is feeling well.  Wound odor still noticeable.  Culture collected last visit was positive for light growth of Proteus.  Given continued odor, will go ahead and prescribe short course of Augmentin, no other p.o. options available based on sensitivities.  Continue with Dakin's wound cleansing.  Home health to restart VAC on Friday 11/6/2024: Clinic visit with Steve Hodges MD. Patient reports doing well, denies any acute issues. Tolerating augmentin well without side effects. Odor much improved today. Wounds are improving slowly. Continue wound  VAC.    11/14/2024: Clinic visit with Steve Hodges MD. Patient reports doing ok. He was frustrated coming into clinic, was having trouble exiting his van. No evidence of wound infection today    11/20/2024: Clinic visit with Steve Hodges MD. Patient reports feeling in normal state of health. His ischial wounds stable and slowly improving. He has reopened sacral wound however. Denies any signs or symptoms of infection.    11/27/2024: Clinic visit with Steve Hodges MD. Patient reports doing well, denies any acute issues. Sacral wound measuring smaller. Right ischial wound smaller. Left ischial wound larger with increased slough and necrotic tissue at base of wound. Patient has contacted [employer] technician to evaluate seat due to reopening of sacrum, he is pending call back.    12/4/2024: Clinic visit with Steve Hodges MD. Patient reports doing well, denies any signs or symptoms of infection. Denies any issues with wound VAC. Sacrum has resolved. Right ischium wound larger with necrotic tissue, left ischium stable. He denies any traumatic events or any changes to his routine that would have increased pressure on right ischium besides time spent in chair during thanksgiving with family.    12/11/2024: Clinic visit with Steve Hodges MD. Patient reports doing well, denies any acute issues. Wounds are stable. No further necrosis of right ischium. Patient denies any signs or symptoms of infection.    12/18/2024: Clinic visit with Steve Hodges MD. Patient reports doing ok. Reports was diagnosed with UTI by urology, picking up Abx later today, thinks that he was prescribed Augmentin. Right ischial wound measuring larger, dusky tissue concerning for increased pressure. He reports that he has been up in wheelchair more this week with friend in town and has not been using tilt feature of chair as frequently. He was encouraged to spend more time offloading.   Due to holiday's, and dressings only going to be  changed 2x weekly, it is medically necessary to continue wound VAC for now as it would be severely problematic for patient to be sitting with saturated dressings during this period.    1/8/2025: Clinic visit with Steve Hodges MD. Patient reports chest congestion with low grade fevers for last few days. Denies any issues with wounds. He has increased necrosis of left IT pressure injury, he denies any changes in activity and is using tilt feature in chair every 30 min. Patient reports that he has appointment with [facility] next Friday to re-evaluate custom seat.    1/15/2025: Clinic visit with Steve Hodges MD. Patient returns to clinic following hospitalization for mycoplasma pneumonia.  Patient reports that he is feeling better denies any fevers or chills currently.  Patient has upcoming seating eval by [employer].  Wound VAC has been held since last week, wound seem to be slightly improved with holding VAC. Will continue to hold this this week.    1/22/2025: Clinic visit with Steve Hodges MD. Patient reports doing well, denies any acute issues. He had seat cushion re-evaluation and the technician noted that he had no hot spots when he was back far enough in chair, but if he slides forward then he is putting pressure on ischial tuberosities which explains the concern for pressure noted last few weeks. Patient will keep wheelchair slightly tilted back. Wounds with increased granulation tissue. Discontinue wound VAC and he will return to .    1/29/2025: Clinic visit with Steve Hodges MD. Patient reports doing ok, denies any signs or symptoms of infection. Patient assures me that he is sitting further back in chair as recommended by PT/wheel chair technician. Wounds are slowly improving, he reports less drainage. Will trial use of Hydrofiber instead of Enluxtra.    2/5/2025: Clinic visit with Steve Hodges MD. Patient reports doing well. Wounds are not overly macerated after holding Enluxtra,  continue hydrofiber.    2/12/2025: Clinic visit with Steve Hodges MD. Patient reports doing ok. Reports that Monday night his catheter was obstructed and caused leak saturating dressings. His group home changed and dried him, but dressings remained saturated. If something like this should occur again, patient will contact home health for dressing change.    REVIEW OF SYSTEMS:   Unchanged from previous wound clinic assessment on 2/5/2025, except as noted in interval history above.      PHYSICAL EXAMINATION:   There were no vitals taken for this visit.  Physical Exam  Constitutional:       Appearance: He is obese.   Cardiovascular:      Rate and Rhythm: Normal rate.   Pulmonary:      Effort: Pulmonary effort is normal.   Abdominal:      Comments: Colostomy left lower quadrant   Genitourinary:     Comments: Suprapubic catheter to down drain   Skin:     Comments: Stage IV pressure injury to right ischium: Wound stable, improved tissue quality, thin layer of slough.    Stage IV pressure injury of left ischium: Wound stable, improved tissue quality. Thin layer of slough    Stage IV pressure injury sacrum: Remains resolved    All other wounds remain healed, high risk for recurrence     Neurological:      Mental Status: He is alert and oriented to person, place, and time.   Psychiatric:         Mood and Affect: Mood normal.         WOUND ASSESSMENT  Wound 12/12/23 Pressure Injury Ischium Right (Active)   Wound Image    02/12/25 1500   Site Assessment Pink;Red;Yellow 02/12/25 1500   Periwound Assessment Maceration;Fragile 02/12/25 1500   Margins Unattached edges 02/12/25 1500   Closure Secondary intention 10/16/24 1700   Drainage Amount Large 02/12/25 1500   Drainage Description Serosanguineous 02/12/25 1500   Treatments Cleansed;Provider debridement 02/12/25 1500   Offloading/DME Other (comment) 02/12/25 1500   Wound Cleansing Dakin's Solution 02/12/25 1500   Periwound Protectant No-sting Skin Prep;Moisture Barrier  02/12/25 1500   Dressing Status Intact;Old drainage 10/02/24 1500   Dressing Changed Changed 01/22/25 1500   Dressing Cleansing/Solutions Normal Saline 02/12/25 1500   Dressing Options Hydrofera Blue Classic;Hydrofiber;Offloading Dressing - Sacral 02/12/25 1500   Dressing Change/Treatment Frequency Monday, Wednesday, Friday, and As Needed 02/12/25 1500   Wound Team Following Weekly 10/16/24 1700   WOUND NURSE ONLY - Pressure Injury Stage 4 02/12/25 1500   Wound Length (cm) 4.5 cm 02/12/25 1500   Wound Width (cm) 2.1 cm 02/12/25 1500   Wound Depth (cm) 0.5 cm 02/12/25 1500   Wound Surface Area (cm^2) 9.45 cm^2 02/12/25 1500   Wound Volume (cm^3) 4.725 cm^3 02/12/25 1500   Post-Procedure Length (cm) 4.5 cm 02/12/25 1500   Post-Procedure Width (cm) 2.2 cm 02/12/25 1500   Post-Procedure Depth (cm) 0.5 cm 02/12/25 1500   Post-Procedure Surface Area (cm^2) 9.9 cm^2 02/12/25 1500   Post-Procedure Volume (cm^3) 4.95 cm^3 02/12/25 1500   Wound Healing % 92 02/12/25 1500   Tunneling (cm) 0 cm 02/05/25 1530   Undermining (cm) 2.3 cm 02/12/25 1500   Undermining of Wound, 1st Location From 6 o'clock;From 7 o'clock 02/12/25 1500   Undermining (cm) - 2nd location 0 cm 01/08/25 1640   Undermining of Wound, 2nd Location From 12 o'clock;To 1 o'clock 12/04/24 1615   Wound Odor Strong;Foul 02/12/25 1500   Exposed Structures None 02/12/25 1500   Number of days: 428       Wound 05/01/24 Pressure Injury Ischium Left (Active)   Wound Image    02/12/25 1500   Site Assessment Red;Pink;Yellow 02/12/25 1500   Periwound Assessment Maceration;Scar tissue 02/12/25 1500   Margins Unattached edges 02/12/25 1500   Closure Secondary intention 09/18/24 1500   Drainage Amount Large 02/12/25 1500   Drainage Description Serosanguineous 02/12/25 1500   Treatments Cleansed;Provider debridement 02/12/25 1500   Offloading/DME Other (comment) 02/12/25 1500   Wound Cleansing Dakin's Solution 02/12/25 1500   Periwound Protectant Moisture Barrier 02/12/25 1500    Dressing Changed Changed 02/05/25 1530   Dressing Cleansing/Solutions Normal Saline 02/12/25 1500   Dressing Options Hydrofera Blue Classic;Hydrofiber;Offloading Dressing - Sacral 02/12/25 1500   Dressing Change/Treatment Frequency Monday, Wednesday, Friday, and As Needed 02/12/25 1500   Wound Team Following Weekly 12/18/24 1700   WOUND NURSE ONLY - Pressure Injury Stage 4 02/12/25 1500   Non-staged Wound Description Not applicable 09/18/24 1600   Wound Length (cm) 4.7 cm 02/12/25 1500   Wound Width (cm) 1.8 cm 02/12/25 1500   Wound Depth (cm) 1.4 cm 02/12/25 1500   Wound Surface Area (cm^2) 8.46 cm^2 02/12/25 1500   Wound Volume (cm^3) 11.844 cm^3 02/12/25 1500   Post-Procedure Length (cm) 4.9 cm 02/12/25 1500   Post-Procedure Width (cm) 1.9 cm 02/12/25 1500   Post-Procedure Depth (cm) 1.4 cm 02/12/25 1500   Post-Procedure Surface Area (cm^2) 9.31 cm^2 02/12/25 1500   Post-Procedure Volume (cm^3) 13.034 cm^3 02/12/25 1500   Wound Healing % -[id number] 02/12/25 1500   Tunneling (cm) 0 cm 02/12/25 1500   Undermining (cm) 0 cm 02/12/25 1500   Undermining of Wound, 1st Location From 4 o'clock;To 10 o'clock 12/18/24 1700   Wound Odor Strong;Foul 02/12/25 1500   Exposed Structures None 02/12/25 1500   Number of days: 287       Wound 01/09/25 Other (comment) Coccyx Left;Right (Active)   Number of days: 34       Wound 01/09/25 Pressure Injury Sacrum (Active)   Number of days: 34       Wound 01/09/25 Other (comment) Toe, 3rd;Toe, 4th Left (Active)   Number of days: 34       PROCEDURE: Excisional debridement of right and left ischial wounds  -2% viscous lidocaine applied topically to wound bed for approximately 5 minutes prior to debridement  -Curette used to debride wound bed.  Excisional debridement was performed to remove devitalized tissue until healthy, bleeding tissue was visualized.  Total area debrided was approximately 19.21 cm², including into undermined areas of wounds.  Tissue debrided into the muscle / fascia layer.  "   -Bleeding controlled with manual pressure.    -Wound care completed by wound RN, refer to flowsheet  -Patient tolerated the procedure well, without c/o pain or discomfort.    Pertinent Labs and Diagnostics:    Labs:     A1c: No results found for: \"HBA1C\"     [facility] results, 7/1/2022 (under media tab)    CRP 13    ESR 31      IMAGING:     X-ray left tib-fib ordered 2/16/2024 through quality home imaging    12/11/2023-CT of abdomen pelvis with contrast  IMPRESSION:   1.  Right ischial decubitus ulcer extending to bone with soft tissue gas tracking along the right perineum. Appearance suggesting osteomyelitis, consider component of necrotizing fasciitis as clinically appropriate.  2.  Small pericardial effusion  3.  Left adrenal nodule, density on prior noncontrast CT demonstrates adenoma.  4.  Hepatomegaly  5.  Enlarged prostate, workup and evaluation for causes of prostate enlargement recommended as clinically appropriate.  6.  Atherosclerosis and atherosclerotic coronary artery disease    12/15/2023-bone scan of left foot  IMPRESSION:     1.  Mild increased activity in the LEFT 1st and 3rd toes on blood pool and delayed images possibly indicating inflammation/infection.  2.  No significant blood flow asymmetry.          VASCULAR STUDIES: No results found.    LAST  WOUND CULTURE:   Lab Results   Component Value Date/Time    CULTRSULT  01/10/2025 04:45 PM     Moderate growth usual upper respiratory ade  No clinically significant Staphylococcus aureus, Methicillin  Resistant Staphylococcus aureus, or Pseudomonas species  isolated.        PATHOLOGY  2/17/2023-bone fragment extracted from left lower extremity wound\\  FINAL DIAGNOSIS:     A. Left leg bone fragment at base of chronic wound:          Extensively degenerated fibrocartilaginous tissue with a rim of           fibrinopurulent debris          Correlate with culture findings            Comment: While no residual intact bone is identified, these           " findings are suggestive of adjacent osteomyelitis.      ASSESSMENT AND PLAN:     1. Sacral decubitus ulcer, stage IV ([facility], located [facility])  Comments: Ulcer first noted in early April 2022 as small open area which quickly enlarged.  This ulcer is present distal from previous sacral ulcer which healed after surgery in January.  Patient has history of flap reconstruction x2 to this area.    2/12/2025: Wound remains resolved  - Continue to protect area with pressure relieving sacral foam dressing.  -Patient does spend a lot of time up in his wheelchair, and wishes to continue to do so for his quality of life.  He lives independently, drives, and is involved with family activities.    -His presents today with a new cushion in his wheelchair.  Has yet to pick out a new wheelchair  - Known OM that was previously treated. CT scan done during recent hospitalization did not show OM of sacrum, however OM of the ischium noted.  -Patient is very well versed in pressure relief strategies    Wound care: silicone adhesive foam dressing    2. Pressure injury of right ischium, stage 4 ([facility])  Comments: Abscess and OM found on CT during hospitalization in December 2023.  Patient underwent I&D with VAC placement.  IV antibiotics through 1/22/2024.    2/12/2025: Wound stable. Improved tissue quality.  - Excisional debridement of nonviable tissue from wound in clinic today, medically necessary to promote wound healing  - VAC discontinued previously.  -Patient to return to clinic weekly for assessment, debridement, and dressing change.    -Home health to change dressing 2 times per week in between clinic visits.    -Patient is very well versed on pressure relief measures, has adequate surface on bed, alternating low air loss.    Wound care: Hydrofera Blue Classic, Hydrofiber, Silicone foam    3. Pressure injury of left ischium, stage 4 ([facility])    2/12/2025: Wound slightly larger, tissue quality continues to improve.  - Excisional debridement of wound in  clinic today, medically necessary to promote wound healing.  - Patient to return to clinic weekly for assessment, debridement, and dressing change  - Discontinue wound VAC for now  -Home health to change dressing 2 times per week in between clinic visits.    -Patient has new cushion in his wheelchair, see above  -Patient is very well versed on pressure relief measures, has adequate surface on bed, alternating low air loss.    Wound care: Hydrofera Blue Classic, Hydrofiber, Silicone foam    4. Other acute osteomyelitis, other site ([facility])    2/12/2025: Bone fragments removed during clinic visit in June were sent for pathology, polymicrobial, most concerning was an MDR ESBL Proteus.   -Patient completed IV antibiotics.  No further antibiotics at this time per ID  -Patient understands he has a very low threshold for infection/sepsis.  He understands he is to go to the emergency room immediately if he begins to experience fevers, chills, nausea or vomiting, or if he notices radiating erythema or purulent drainage from his wounds.    5. Quadriplegia, C5-C7 incomplete ([facility])  Comments: Complicating factor.  Impaired mobility and sensation  -Patient is still spending 7-8 hours/day up in his wheelchair and knows to reposition frequently.  Wears heel float boots bilaterally at all times  - Patient has new custom wheelchair seat. He had refitting and appears he was not sitting back in chair enough which was causing undue pressure to IT. He is more aware of the correct positioning in chair.    Please note that this note may have been created using voice recognition software. I have worked with technical experts from [employer] [email or web address] [facility] to optimize the interface.  I have made every reasonable attempt to correct obvious errors, but there may be errors of grammar and possibly content that I did not discover before finalizing the note.

## 2025-02-13 NOTE — PATIENT INSTRUCTIONS
After Visit Summary Wound Care Instructions    Nutrition - Patient instructed increased protein diet unless contraindicated in renal failure (meat, eggs, fish, yogurt, cottage cheese, beans), use of multivitamin with minerals and Arginaid supplementation (check if ok with Primary Care Provider first).    Infection -  instructed signs and symptoms of infection, increased redness and swelling, localized heat over wound and surrounding area/fever/chills/nausea and vomiting, when to call doctor or go to Emergency Room.     Dressing Changes - Instructed patient rationale for wound care products. Instructed to keep dressings clean and dry, shower on clinic days right before coming in. Change your dressing if it becomes soiled, soaked, or falls off.    Questions - should you have any questions regarding your home care instructions, please contact the wound center at [phone]. If after hours, contact your primary care physician or go to the hospital emergency room.   If you are admitted to any hospital, you will need a new referral to come back to the wound clinic and any scheduled appointments that you already have, may be cancelled.

## 2025-02-19 ENCOUNTER — OFFICE VISIT (OUTPATIENT)
Dept: WOUND CARE | Facility: MEDICAL CENTER | Age: 75
End: 2025-02-19
Payer: MEDICARE

## 2025-02-19 DIAGNOSIS — L89.154 SACRAL DECUBITUS ULCER, STAGE IV (HCC): ICD-10-CM

## 2025-02-19 DIAGNOSIS — L89.314 PRESSURE INJURY OF RIGHT ISCHIUM, STAGE 4 (HCC): ICD-10-CM

## 2025-02-19 DIAGNOSIS — G82.54 QUADRIPLEGIA, C5-C7, INCOMPLETE (HCC): ICD-10-CM

## 2025-02-19 DIAGNOSIS — L89.324 PRESSURE INJURY OF LEFT ISCHIUM, STAGE 4 (HCC): ICD-10-CM

## 2025-02-19 DIAGNOSIS — M86.18 OTHER ACUTE OSTEOMYELITIS, OTHER SITE (HCC): ICD-10-CM

## 2025-02-19 DIAGNOSIS — T83.038D: ICD-10-CM

## 2025-02-19 PROCEDURE — 11042 DBRDMT SUBQ TIS 1ST 20SQCM/<: CPT

## 2025-02-19 PROCEDURE — 99213 OFFICE O/P EST LOW 20 MIN: CPT

## 2025-02-19 PROCEDURE — 99213 OFFICE O/P EST LOW 20 MIN: CPT | Mod: 25 | Performed by: NURSE PRACTITIONER

## 2025-02-19 PROCEDURE — 11043 DBRDMT MUSC&/FSCA 1ST 20/<: CPT | Performed by: NURSE PRACTITIONER

## 2025-02-19 PROCEDURE — 11043 DBRDMT MUSC&/FSCA 1ST 20/<: CPT

## 2025-02-20 NOTE — PROGRESS NOTES
Provider Encounter- Pressure Injury        HISTORY OF PRESENT ILLNESS  Wound History:    START OF CARE IN CLINIC: 1/31/2024 (return to clinic after hospitalization)    REFERRING PROVIDER: Racquel York       WOUNDS-superior coccyx pressure injury, stage IV-resolved                                 Coccyx pressure injury, stage IV-resolved           Inferior coccyx pressure injury, stage IV resolved                                 Right ischial pressure injury, stage IV                                 Left heel pressure injury, recurring stage III-resolved                                 Left posterior lower leg/calf-stage III-resolved                                 Left medial lower leg surgical wound dehiscence-resolved, reopens frequently-resolved            Left ischial pressure injury, stage IV-first observed in clinic 5/1/2024          HISTORY: Patient with history of incomplete quadriplegia referred to [facility] for treatment of a stage IV pressure injury.  He has a history of previous pressure injuries to this area, and underwent muscle flaps in 2019, and then again in 2020.  He was seen in the wound clinic in November 2021 for an ulcer proximal from his current ulcer, and pressure injuries to his left posterior lower leg and left heel.  At that time, it was discovered that the patient had retained VAC foam embedded in the wound bed of the sacral wound.  Attempts were made to get him back to his plastic surgeon, though unsuccessful.  In January he underwent surgical removal of VAC sponge along with excisional debridement of his sacral wound by Dr. Chaves.  After the surgery, his wound went on to heal without incident.   In early April 2022, his home health nurse noted a new sacral ulcer, below the previous ulcer which quickly tripled in size over the following weeks.  The ulcer to his left medial lower leg had also deteriorated, with bone visible at the base..  He was hospitalized from 4/22 until 4/27/2022 and  underwent surgery with Dr. Raman on 4/26 for irrigation and debridement of multiple compartments of the left lower extremity, bone excision, and complex closure of chronic wound using biologic skin substitute.   His sacrococcygeal wound was not surgically addressed during this admission.  He was discharged back to his group home, with home health, and referral to outpatient wound clinic for his sacral wound.  He was instructed to follow-up with his surgeon for his lower leg wound.       Postoperatively, the left medial lower leg incision dehisced.  He was seen by his surgeon at [facility] on 5/11.  The surgeon opted to leave remaining sutures in place, and refer him to the wound clinic for treatment of this wound.   Treatment of this wound was initiated in clinic on 5/12.  During this visit was also noted that his heel DTI had resolved, but that he had a new pressure injury to his left posterior lower extremity.     A new pressure injury was noted to patient's right upper buttock/lower back on 5/20/2022.  Wound was linear in shape, skin discolored but intact.     Abrasion noted to left anterior lower leg.  First observed in clinic on 7/22/2022.  Patient states he bumped his leg into his food tray.     Small DTI noted to patient's left lateral lower leg on 7/29/2022.  Skin intact but discolored.     Large area of deep tissue injury noted to patient's left exterior lower leg.  Patient denied any trauma to this area.  Skin intact.  Wound documented.    1/27/2023: Patient was admitted to [facility] from 1/23-1/25/2023 with gross hematuria. He underwent RICHARD which showed watchman device was in place and he was taken off of Xarelto. While hospitalized wound team was consulted. He was referred back to [facility] and home health upon discharge.    Patient was hospitalized at [facility] for pyelonephritis from 2/26 until 3/2/2023, admitted for fever and general malaise.  He was admitted and initially started on linezolid and meropenem for suspected  UTI and history of multidrug-resistant organisms.  Urine cultures were negative. ID was consulted, recommended CT of chest and abdomen,which were negative for acute findings. However, he was treated with 5 days total course of antibiotics for suspected UTI, and symptoms completely resolved.  During this admission, the inpatient wound team was consulted for treatment of his sacral and lower leg wounds.  A wound culture was taken from his left heel pressure ulcer, negative.  Once stabilized, he was discharged home and referred back to [facility] to resume treatment of his wounds.    Patient was hospitalized at [facility] from 12/11 until 12/23/2023, admitted for fever.  Wound infection suspected.  CT scan of abdomen and pelvis for evaluation of sacral pressure injury showed gas tracking down to the bone consistent with osteomyelitis.  He underwent I&D of right ischial ulcer (documented as buttock) with Dr. Bansal, medial tract leading to an abscess was identified.  Cavity was opened allowing it to drain into the main wound bed.  Wound VAC was placed and managed by wound team during this admission.  A bone scan of patient's left foot was also done, initially concerning for osteomyelitis.  Orthopedic surgery was consulted and did not recommend surgical intervention. ID consulted also, recommended the patient to receive IV ertapenem 1 g every 24 hours plus IV daptomycin 8 mg/kg every 24 hours through 1/22/2024.   He was discharged to [facility] on 1/23 for IV antibiotics and wound care.  From the LTAC he was discharged home on 1/22 with home health and referral back to [facility] to resume management of his wounds  .      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  Never used smokeless tobacco    Patient's problem list, allergies, and current medications reviewed and updated in Epic    Interval History:  Interval History thinned 7/29/2022.  Please see previous notes for complete interval history.   Interval History thinned 1/27/2023. Please see previous note for complete interval history.  Interval History thinned 3/3/2023.  Please see previous notes for complete interval history.    Interval History thinned 8/4/2023.  Please see previous notes for complete interval history.    Interval History thinned 1/31/2024.  Please see previous notes for complete interval history.    Interval History thinned 6/26/2024.  Please see previous notes for complete interval history.      6/19/2024: Clinic visit with Steve Hodges MD. Patient denies any fevers or chills. Reports he is tolerating Abx. He has appointment with ID later today to discuss OM treatment. He is tolerating wound VAC. Slight bone exposed bilaterally in ischial wounds, slightly worse.    6/26/2024 : Clinic visit with ADILSON Arthur, FNPEGGY-BC, CWDINESHN, CFTRACI.   Patient presents today with significant deterioration of his both ischial wounds, with increased depth of undermining.   He now has a PICC line, and will be starting outpatient infusions of ertapenem later today.  He states he is feeling well, denies fevers, chills, nausea, vomiting, cough or shortness of breath.  Due to deterioration of his wound, we did discuss possibly having him go over the hospital for surgical debridement and IV antibiotics.  He was hesitant to do so.  We agreed to see how he looks after a week or so IV antibiotics.  He is agreeable to minimizing time up in his wheelchair.  He also agrees to go to the emergency room immediately if he develops any signs or symptoms of infection.    7/10/2024: Clinic visit with Steve Hodges MD. Patient reports feeling in normal state of health. He missed  last appointment as he had fall out of wheelchair and was evaluated in ED. He denies any injuries from fall. Patient wounds are measuring slightly smaller. He continues on Ertapenem. Patient reports that he has appointment for seating evaluation from [facility] scheduled, but does not recall the date.    7/17/2024 : Clinic visit with ADILSON Arthur, HOLDEN, LILIYA VALERIO.   Anjum states he is feeling well.  Left ischial wound measures significantly smaller today, however measurements vary somewhat depending on pt's position.  Both wounds appear to be progressing slightly, with improving tissue quality.    7/24/2024 : Clinic visit with ADILSON Arthur, HOLDEN, IMAN, LILIYA.   Patient continues to feel well, offers no complaints.  Right ischial wound measures smaller, left ischial wound is larger.  Patient tolerating VAC without any difficulty.  Home health continues to see him in between clinic visits.  He is still receiving daily infusions of IV antibiotics, will be completing these in August.    7/31/2024 : Clinic visit with ADILSON Arthur, HOLDEN, IMAN, ILLIYA.   Anjum continues to feel well, his wounds are slowly progressing.  Tolerating VAC.  He is on IV antibiotics for 1 more week.  Admits that he is up in his wheelchair for 10 to 14 hours/day.    8/7/2024 : Clinic visit with ADILSON Arthur FNP-BC, LILIYA VALERIO.   Anjum states he is feeling well today, offers no complaints.  Both wounds measure a bit smaller today, new granulation tissue noted.  He will be getting his last IV antibiotic infusion today.    8/14/2024 : Clinic visit with ADILSON Arthur FNP-BC, IMAN, LILIYA.   Patient continues to feel well.  He has completed his antibiotics, followed up with ID earlier this week, no further antibiotic therapy at this time.  Ischial wounds are slowly progressing.  Lower extremity wounds remain resolved.    8/21/2024 : Clinic visit with ADILSON Arthur FNP-BC, LILIYA VALERIO.   Anjum states he is feeling  well, offers no complaints.  His wounds are slowly progressing, increased granulation.    8/28/2024 : Clinic visit with ADILSON Arthur, HOLDEN, LILIYA VALERIO.   Turk states he is feeling well overall.  His wounds measure slightly smaller.  He has had some urinary symptoms, reports leakage from around his suprapubic catheter last night, and excessively full urine bag.  He has been in contact with his urologist office.  He also mentions that he has a recurring small scab to his nose, states that it falls off only to return shortly after.  This has been going on for several months.  I offered to refer him to dermatology.    9/11/2024 : Clinic visit with ADILSON Arthur, HOLDEN, LILIYA VALERIO.   Turk states he is feeling well.  He missed his appointment last week due to car troubles.  His vehicle is now running well.  Ischial wounds show some improvement, increased granulation tissue.  He does have a small reopening of left posterior lower leg wound, appears to be a small abrasion over area of scar tissue.    9/18/2024: Clinic visit with Steve Hodges MD. Patient reports doing ok. Denies any acute issues with wound VAC. Reports that he was fitted by [employer] for new seat cushion and is being manufactured, he is not sure when will be ready. Patient's wounds appears slightly improved. Left posterior leg wound remains open.    9/25/2024 : Clinic visit with ADILSON Arthur, HOLDEN, IMAN, LILIYA.   Patient states he is feeling well.  His wounds measure about the same.    10/2/2024: Clinic visit with HOLDEN Johnson CWON, LILIYA.  Pt denies fevers, chills, nausea, vomiting. Bilateral ischial ulcers increased in area.  Left IT quality overall worse compared to right IT.  Recommend Dakin's soak.  Continue with VAC to bilateral IT.    10/9/2024 : Clinic visit with ADILSON Arthur, HOLDEN, IMAN, LILIYA.   Patient continues to feel well overall.  His wounds measure about the same, no evidence of infection.   He does have some minor breakdown over the scar tissue of his sacrum which bears watching.  He has not heard from [employer] about his seat cushion, states he will contact them again.    10/16/2024 : Clinic visit with ADILSON Arthur, HOLDEN, IMAN, LILIYA.   Anjum continues to feel well.  His wounds measure slightly smaller.  Sacral wound just slightly open.  He called [employer] earlier this week, was told his new cushion is ready, and that they would deliver it next Monday.  He also states he is due for a new wheelchair, but has not yet become process.      10/23/2024 : Clinic visit with ADILSON Arthur, HOLDEN, IMAN, LILIYA.   Anjum's wounds present today with significantly more odor.  He states he is feeling fine, denies fevers, chills, nausea, vomiting, cough or shortness of breath.  Increased drainage noted.  Wounds measure about the same.  Culture collected in clinic today after debridement.  VAC placed on hold.  Wounds packed with Puracyn moistened silver Hydrofiber.   Patient reminded that he is to go to the emergency room if he notices any increased redness, swelling, drainage or odor, or if he develops fever, chills, nausea or vomiting.    He has a new cushion to his wheelchair.  States that he does not yet have a new wheelchair, will be picking on out the next few weeks.    10/30/2024 : Clinic visit with ADILSON Arthur, HOLDEN, IMAN, LILIYA.   Anjum states he is feeling well.  Wound odor still noticeable.  Culture collected last visit was positive for light growth of Proteus.  Given continued odor, will go ahead and prescribe short course of Augmentin, no other p.o. options available based on sensitivities.  Continue with Dakin's wound cleansing.  Home health to restart VAC on Friday 11/6/2024: Clinic visit with Steve Hodges MD. Patient reports doing well, denies any acute issues. Tolerating augmentin well without side effects. Odor much improved today. Wounds are improving slowly. Continue wound  VAC.    11/14/2024: Clinic visit with Steve Hodges MD. Patient reports doing ok. He was frustrated coming into clinic, was having trouble exiting his van. No evidence of wound infection today    11/20/2024: Clinic visit with Steve Hodges MD. Patient reports feeling in normal state of health. His ischial wounds stable and slowly improving. He has reopened sacral wound however. Denies any signs or symptoms of infection.    11/27/2024: Clinic visit with Steve Hodges MD. Patient reports doing well, denies any acute issues. Sacral wound measuring smaller. Right ischial wound smaller. Left ischial wound larger with increased slough and necrotic tissue at base of wound. Patient has contacted [employer] technician to evaluate seat due to reopening of sacrum, he is pending call back.    12/4/2024: Clinic visit with Steve Hodges MD. Patient reports doing well, denies any signs or symptoms of infection. Denies any issues with wound VAC. Sacrum has resolved. Right ischium wound larger with necrotic tissue, left ischium stable. He denies any traumatic events or any changes to his routine that would have increased pressure on right ischium besides time spent in chair during thanksgiving with family.    12/11/2024: Clinic visit with Steve Hodges MD. Patient reports doing well, denies any acute issues. Wounds are stable. No further necrosis of right ischium. Patient denies any signs or symptoms of infection.    12/18/2024: Clinic visit with Steve Hodges MD. Patient reports doing ok. Reports was diagnosed with UTI by urology, picking up Abx later today, thinks that he was prescribed Augmentin. Right ischial wound measuring larger, dusky tissue concerning for increased pressure. He reports that he has been up in wheelchair more this week with friend in town and has not been using tilt feature of chair as frequently. He was encouraged to spend more time offloading.   Due to holiday's, and dressings only going to be  changed 2x weekly, it is medically necessary to continue wound VAC for now as it would be severely problematic for patient to be sitting with saturated dressings during this period.    1/8/2025: Clinic visit with Steve Hodges MD. Patient reports chest congestion with low grade fevers for last few days. Denies any issues with wounds. He has increased necrosis of left IT pressure injury, he denies any changes in activity and is using tilt feature in chair every 30 min. Patient reports that he has appointment with [facility] next Friday to re-evaluate custom seat.    1/15/2025: Clinic visit with Steve Hodges MD. Patient returns to clinic following hospitalization for mycoplasma pneumonia.  Patient reports that he is feeling better denies any fevers or chills currently.  Patient has upcoming seating eval by [employer].  Wound VAC has been held since last week, wound seem to be slightly improved with holding VAC. Will continue to hold this this week.    1/22/2025: Clinic visit with Steve Hodges MD. Patient reports doing well, denies any acute issues. He had seat cushion re-evaluation and the technician noted that he had no hot spots when he was back far enough in chair, but if he slides forward then he is putting pressure on ischial tuberosities which explains the concern for pressure noted last few weeks. Patient will keep wheelchair slightly tilted back. Wounds with increased granulation tissue. Discontinue wound VAC and he will return to .    1/29/2025: Clinic visit with Steve Hodges MD. Patient reports doing ok, denies any signs or symptoms of infection. Patient assures me that he is sitting further back in chair as recommended by PT/wheel chair technician. Wounds are slowly improving, he reports less drainage. Will trial use of Hydrofiber instead of Enluxtra.    2/5/2025: Clinic visit with Steve Hodges MD. Patient reports doing well. Wounds are not overly macerated after holding Enluxtra,  continue hydrofiber.    2/12/2025: Clinic visit with Steve Hodges MD. Patient reports doing ok. Reports that Monday night his catheter was obstructed and caused leak saturating dressings. His group home changed and dried him, but dressings remained saturated. If something like this should occur again, patient will contact home health for dressing change.    2/19/2025 : Clinic visit with ADILSON Arthur, FNP-BC, ALEXN, CFTRACI.   Chart presents today saturated in urine due to leakage from his suprapubic catheter.  He states this is a frequent event due to bladder spasms.  He has seen his urologist several times, recently started getting Botox injections, does not feel this has helped.  I suggest that he consider getting a urostomy, would divert from bladder altogether, and if well created, would result in better containment of his urine.  He states he has an appointment with his urologist next month and will discuss with him.   His wounds continue to progress, but both measure smaller.    REVIEW OF SYSTEMS:   Unchanged from previous wound clinic assessment on 2/12/2025, except as noted in interval history above.      PHYSICAL EXAMINATION:   There were no vitals taken for this visit.  Physical Exam  Constitutional:       Appearance: He is obese.   Cardiovascular:      Rate and Rhythm: Normal rate.   Pulmonary:      Effort: Pulmonary effort is normal.   Abdominal:      Comments: Colostomy left lower quadrant   Genitourinary:     Comments: Suprapubic catheter to down drain   Skin:     Comments: Stage IV pressure injury to right ischium: Wound area has decreased.  New granulation tissue noted.  Thin layer of slough to wound bed.  Moderate serosanguineous drainage, no odor.  No periwound erythema or induration    Stage IV pressure injury of left ischium: Wound area has decreased.  New granulation tissue noted.  Thin layer of slough to wound bed.  Moderate serosanguineous drainage, no odor.  No periwound erythema or  induration    Stage IV pressure injury sacrum: Remains resolved    All other wounds remain healed, high risk for recurrence     Neurological:      Mental Status: He is alert and oriented to person, place, and time.   Psychiatric:         Mood and Affect: Mood normal.         WOUND ASSESSMENT  Wound 12/12/23 Pressure Injury Ischium Right (Active)   Wound Image   02/19/25 1500   Site Assessment Pink;Red;Yellow 02/19/25 1500   Periwound Assessment Maceration;Scar tissue 02/19/25 1500   Margins Unattached edges 02/19/25 1500   Closure Secondary intention 10/16/24 1700   Drainage Amount Large 02/19/25 1500   Drainage Description Thick;Tan 02/19/25 1500   Treatments Cleansed;Provider debridement 02/19/25 1500   Offloading/DME Other (comment) 02/19/25 1500   Wound Cleansing Hypochlorus Acid 02/19/25 1500   Periwound Protectant No-sting Skin Prep;Moisture Barrier 02/12/25 1500   Dressing Status Intact;Old drainage 10/02/24 1500   Dressing Changed Changed 01/22/25 1500   Dressing Cleansing/Solutions Normal Saline 02/19/25 1500   Dressing Options Hydrofiber Silver;Offloading Dressing - Sacral 02/19/25 1500   Dressing Change/Treatment Frequency Monday, Wednesday, Friday, and As Needed 02/19/25 1500   Wound Team Following Weekly 10/16/24 1700   WOUND NURSE ONLY - Pressure Injury Stage 4 02/19/25 1500   Wound Length (cm) 4.3 cm 02/19/25 1500   Wound Width (cm) 2.1 cm 02/19/25 1500   Wound Depth (cm) 0.5 cm 02/19/25 1500   Wound Surface Area (cm^2) 9.03 cm^2 02/19/25 1500   Wound Volume (cm^3) 4.515 cm^3 02/19/25 1500   Post-Procedure Length (cm) 4.3 cm 02/19/25 1500   Post-Procedure Width (cm) 2.1 cm 02/19/25 1500   Post-Procedure Depth (cm) 0.6 cm 02/19/25 1500   Post-Procedure Surface Area (cm^2) 9.03 cm^2 02/19/25 1500   Post-Procedure Volume (cm^3) 5.418 cm^3 02/19/25 1500   Wound Healing % 92 02/19/25 1500   Tunneling (cm) 0 cm 02/05/25 1530   Undermining (cm) 1.5 cm 02/19/25 1500   Undermining of Wound, 1st Location From 5  o'clock;From 7 o'clock 02/19/25 1500   Undermining (cm) - 2nd location 0.5 cm 02/19/25 1500   Undermining of Wound, 2nd Location From 7 o'clock;From 12 o'clock;To 2 o'clock 02/19/25 1500   Wound Odor Mild;Foul 02/19/25 1500   Exposed Structures None 02/19/25 1500   Number of days: 435       Wound 05/01/24 Pressure Injury Ischium Left (Active)   Wound Image   02/19/25 1500   Site Assessment Pink;Red;Yellow 02/19/25 1500   Periwound Assessment Maceration;Scar tissue 02/19/25 1500   Margins Unattached edges 02/19/25 1500   Drainage Amount Large 02/19/25 1500   Drainage Description Thick;Tan 02/19/25 1500   Treatments Cleansed;Provider debridement 02/19/25 1500   Offloading/DME Other (comment) 02/12/25 1500   Wound Cleansing Hypochlorus Acid 02/19/25 1500   Periwound Protectant Moisture Barrier;Skin Moisturizer 02/19/25 1500   Dressing Changed Changed 02/19/25 1500   Dressing Cleansing/Solutions Normal Saline 02/19/25 1500   Dressing Options Hydrofiber Silver;Offloading Dressing - Sacral 02/19/25 1500   Dressing Change/Treatment Frequency Monday, Wednesday, Friday, and As Needed 02/19/25 1500   Wound Team Following Weekly 12/18/24 1700   WOUND NURSE ONLY - Pressure Injury Stage 4 02/19/25 1500   Wound Length (cm) 4.5 cm 02/19/25 1500   Wound Width (cm) 1.8 cm 02/19/25 1500   Wound Depth (cm) 1.2 cm 02/19/25 1500   Wound Surface Area (cm^2) 8.1 cm^2 02/19/25 1500   Wound Volume (cm^3) 9.72 cm^3 02/19/25 1500   Post-Procedure Length (cm) 4.5 cm 02/19/25 1500   Post-Procedure Width (cm) 1.8 cm 02/19/25 1500   Post-Procedure Depth (cm) 1.4 cm 02/19/25 1500   Post-Procedure Surface Area (cm^2) 8.1 cm^2 02/19/25 1500   Post-Procedure Volume (cm^3) 11.34 cm^3 02/19/25 1500   Wound Healing % -4318 02/19/25 1500   Tunneling (cm) 0 cm 02/19/25 1500   Undermining (cm) 0 cm 02/19/25 1500   Undermining of Wound, 1st Location From 4 o'clock;To 10 o'clock 12/18/24 1700   Wound Odor Strong;Foul;None 02/19/25 1500   Exposed Structures  "None 02/12/25 1500   Number of days: 294       Wound 01/09/25 Other (comment) Coccyx Left;Right (Active)   Number of days: 41       Wound 01/09/25 Pressure Injury Sacrum (Active)   Number of days: 41       Wound 01/09/25 Other (comment) Toe, 3rd;Toe, 4th Left (Active)   Number of days: 41       PROCEDURE: Excisional debridement of right and left ischial wounds  -2% viscous lidocaine applied topically to wound bed for approximately 5 minutes prior to debridement  -Curette used to debride wound bed.  Excisional debridement was performed to remove devitalized tissue until healthy, bleeding tissue was visualized.  Total area debrided was approximately 17.13 cm², including into undermined areas of wounds.  Tissue debrided into the muscle / fascia layer.    -Bleeding controlled with manual pressure.    -Wound care completed by wound RN, refer to flowsheet  -Patient tolerated the procedure well, without c/o pain or discomfort.    Pertinent Labs and Diagnostics:    Labs:     A1c: No results found for: \"HBA1C\"     [facility] results, 7/1/2022 (under media tab)    CRP 13    ESR 31      IMAGING:     X-ray left tib-fib ordered 2/16/2024 through [facility]    12/11/2023-CT of abdomen pelvis with contrast  IMPRESSION:   1.  Right ischial decubitus ulcer extending to bone with soft tissue gas tracking along the right perineum. Appearance suggesting osteomyelitis, consider component of necrotizing fasciitis as clinically appropriate.  2.  Small pericardial effusion  3.  Left adrenal nodule, density on prior noncontrast CT demonstrates adenoma.  4.  Hepatomegaly  5.  Enlarged prostate, workup and evaluation for causes of prostate enlargement recommended as clinically appropriate.  6.  Atherosclerosis and atherosclerotic coronary artery disease    12/15/2023-bone scan of left foot  IMPRESSION:     1.  Mild increased activity in the LEFT 1st and 3rd toes on blood pool and delayed images possibly indicating " inflammation/infection.  2.  No significant blood flow asymmetry.          VASCULAR STUDIES: No results found.    LAST  WOUND CULTURE:   Lab Results   Component Value Date/Time    CULTRSULT  02/11/2025 11:00 AM     Mixed enteric ade >100,000 cfu/mL  3 or more organisms isolated, culture of doubtful  significance, please recollect.        PATHOLOGY  2/17/2023-bone fragment extracted from left lower extremity wound\\  FINAL DIAGNOSIS:     A. Left leg bone fragment at base of chronic wound:          Extensively degenerated fibrocartilaginous tissue with a rim of           fibrinopurulent debris          Correlate with culture findings            Comment: While no residual intact bone is identified, these           findings are suggestive of adjacent osteomyelitis.      ASSESSMENT AND PLAN:     1. Sacral decubitus ulcer, stage IV ([facility]CC)  Comments: Ulcer first noted in early April 2022 as small open area which quickly enlarged.  This ulcer is present distal from previous sacral ulcer which healed after surgery in January.  Patient has history of flap reconstruction x2 to this area.    2/19/2025: Wound remains resolved  - Continue to protect area with pressure relieving sacral foam dressing.  -Patient does spend a lot of time up in his wheelchair, and wishes to continue to do so for his quality of life.  He lives independently, drives, and is involved with family activities.    -His presents today with a new cushion in his wheelchair.  Has yet to pick out a new wheelchair  - Known OM that was previously treated. CT scan done during recent hospitalization did not show OM of sacrum, however OM of the ischium noted.  -Patient is very well versed in pressure relief strategies    Wound care: silicone adhesive foam dressing    2. Pressure injury of right ischium, stage 4 (HCC)  Comments: Abscess and OM found on CT during hospitalization in December 2023.  Patient underwent I&D with VAC placement.  IV antibiotics through  1/22/2024.    2/19/2025: Wound area has decreased.  No evidence of infection  - Excisional debridement of nonviable tissue from wound in clinic today, medically necessary to promote wound healing  - VAC discontinued previously.  -Patient to return to clinic weekly for assessment, debridement, and dressing change.    -Home health to change dressing 2 times per week in between clinic visits.    -Patient is very well versed on pressure relief measures, has adequate surface on bed, alternating low air loss.    Wound care: Hydrofera Blue Classic, Hydrofiber, Silicone foam    3. Pressure injury of left ischium, stage 4 ([facility])    2/19/2025: Wound area has decreased.  No evidence of infection  - Excisional debridement of wound in clinic today, medically necessary to promote wound healing.  - Patient to return to clinic weekly for assessment, debridement, and dressing change  -VAC has been discontinued  -Home health to change dressing 2 times per week in between clinic visits.    -Patient has new cushion in his wheelchair, see above  -Patient is very well versed on pressure relief measures, has adequate surface on bed, alternating low air loss.    Wound care: Hydrofera Blue Classic, Hydrofiber, Silicone foam    4. Other acute osteomyelitis, other site ([facility])    2/19/2025: Bone fragments removed during clinic visit in June were sent for pathology, polymicrobial, most concerning was an MDR ESBL Proteus.   -Patient completed IV antibiotics.  No further antibiotics at this time per ID  -Patient understands he has a very low threshold for infection/sepsis.  He understands he is to go to the emergency room immediately if he begins to experience fevers, chills, nausea or vomiting, or if he notices radiating erythema or purulent drainage from his wounds.    5. Quadriplegia, C5-C7 incomplete ([facility])  Comments: Complicating factor.  Impaired mobility and sensation  -Patient is still spending 7-8 hours/day up in his wheelchair and knows to  reposition frequently.  Wears heel float boots bilaterally at all times  - Patient has new custom wheelchair seat. He had refitting and appears he was not sitting back in chair enough which was causing undue pressure to IT. He is more aware of the correct positioning in chair.    6.  Leakage from urinary catheter, subsequent encounter    2/19/2025: Patient presents today saturated with urine due to leakage from suprapubic catheter.  This has been on ongoing issue for him, complicated by bladder spasms.  -Recently started Botox injections, does not feel these have been helpful thus far  -Suggested that patient consider ileal conduit (urostomy) as possible solution.  He has an appointment with his urologist next month and will discuss it with him.    My total time spent caring for the patient on the day of the encounter was 20 minutes.   This does not include time spent on separately billable procedures/tests.     Please note that this note may have been created using voice recognition software. I have worked with technical experts from [facility] to optimize the interface.  I have made every reasonable attempt to correct obvious errors, but there may be errors of grammar and possibly content that I did not discover before finalizing the note.

## 2025-02-21 ENCOUNTER — HOSPITAL ENCOUNTER (OUTPATIENT)
Facility: MEDICAL CENTER | Age: 75
End: 2025-02-21
Attending: UROLOGY
Payer: MEDICARE

## 2025-02-21 PROCEDURE — 87086 URINE CULTURE/COLONY COUNT: CPT

## 2025-02-21 PROCEDURE — 87077 CULTURE AEROBIC IDENTIFY: CPT | Mod: 91

## 2025-02-23 LAB
BACTERIA UR CULT: NORMAL
SIGNIFICANT IND 70042: NORMAL
SITE SITE: NORMAL
SOURCE SOURCE: NORMAL

## 2025-02-26 ENCOUNTER — OFFICE VISIT (OUTPATIENT)
Dept: WOUND CARE | Facility: MEDICAL CENTER | Age: 75
End: 2025-02-26
Payer: MEDICARE

## 2025-02-26 DIAGNOSIS — M86.18 OTHER ACUTE OSTEOMYELITIS, OTHER SITE (HCC): ICD-10-CM

## 2025-02-26 DIAGNOSIS — L89.324 PRESSURE INJURY OF LEFT ISCHIUM, STAGE 4 (HCC): ICD-10-CM

## 2025-02-26 DIAGNOSIS — L89.154 SACRAL DECUBITUS ULCER, STAGE IV (HCC): ICD-10-CM

## 2025-02-26 DIAGNOSIS — L89.314 PRESSURE INJURY OF RIGHT ISCHIUM, STAGE 4 (HCC): ICD-10-CM

## 2025-02-26 DIAGNOSIS — G82.54 QUADRIPLEGIA, C5-C7, INCOMPLETE (HCC): ICD-10-CM

## 2025-02-26 DIAGNOSIS — T83.038D: ICD-10-CM

## 2025-02-26 PROCEDURE — 11043 DBRDMT MUSC&/FSCA 1ST 20/<: CPT

## 2025-02-26 PROCEDURE — 11042 DBRDMT SUBQ TIS 1ST 20SQCM/<: CPT

## 2025-02-26 NOTE — PATIENT INSTRUCTIONS
- Keep dressings clean and dry. Change dressings every 3-4 days, and if they become over saturated, soiled or fall off.     - If you need to change your dressings at home: Wash your wound with normal saline, wound cleanser, or unscented soap and water prior to applying your new dressings. Please avoid cleansing with hydrogen peroxide or rubbing alcohol. Hydrogen peroxide and rubbing alcohol are toxic to new tissue and skin cells.    - Should you experience any significant changes in your wound(s), such as infection (redness, swelling, localized heat, increased pain, fever > 101 F, chills) or have any questions regarding your home care instructions, please contact the wound center at [phone]. If after hours, contact your primary care physician or go to the hospital emergency room.     - If you are admitted to any hospital, you will need a new referral to come back to the wound clinic and any scheduled appointments that you already have, may be cancelled.    - If you are 5 or more minutes late for an appointment, we reserve the right to cancel and reschedule that appointment. Additionally, if you are habitually late or not showing (3 late cancellations and/or no shows), we reserve the right to cancel your remaining appointments and it will be your responsibility to obtain a new referral if services are still needed.

## 2025-02-26 NOTE — PROGRESS NOTES
Provider Encounter- Pressure Injury        HISTORY OF PRESENT ILLNESS  Wound History:    START OF CARE IN CLINIC: 1/31/2024 (return to clinic after hospitalization)    REFERRING PROVIDER: Racquel York       WOUNDS-superior coccyx pressure injury, stage IV-resolved                                 Coccyx pressure injury, stage IV-resolved           Inferior coccyx pressure injury, stage IV resolved                                 Right ischial pressure injury, stage IV                                 Left heel pressure injury, recurring stage III-resolved                                 Left posterior lower leg/calf-stage III-resolved                                 Left medial lower leg surgical wound dehiscence-resolved, reopens frequently-resolved            Left ischial pressure injury, stage IV-first observed in clinic 5/1/2024          HISTORY: Patient with history of incomplete quadriplegia referred to [facility] for treatment of a stage IV pressure injury.  He has a history of previous pressure injuries to this area, and underwent muscle flaps in 2019, and then again in 2020.  He was seen in the wound clinic in November 2021 for an ulcer proximal from his current ulcer, and pressure injuries to his left posterior lower leg and left heel.  At that time, it was discovered that the patient had retained VAC foam embedded in the wound bed of the sacral wound.  Attempts were made to get him back to his plastic surgeon, though unsuccessful.  In January he underwent surgical removal of VAC sponge along with excisional debridement of his sacral wound by Dr. Chaves.  After the surgery, his wound went on to heal without incident.   In early April 2022, his home health nurse noted a new sacral ulcer, below the previous ulcer which quickly tripled in size over the following weeks.  The ulcer to his left medial lower leg had also deteriorated, with bone visible at the base..  He was hospitalized from 4/22 until 4/27/2022 and  underwent surgery with Dr. Raman on 4/26 for irrigation and debridement of multiple compartments of the left lower extremity, bone excision, and complex closure of chronic wound using biologic skin substitute.   His sacrococcygeal wound was not surgically addressed during this admission.  He was discharged back to his group home, with home health, and referral to outpatient wound clinic for his sacral wound.  He was instructed to follow-up with his surgeon for his lower leg wound.       Postoperatively, the left medial lower leg incision dehisced.  He was seen by his surgeon at [facility] on 5/11.  The surgeon opted to leave remaining sutures in place, and refer him to the wound clinic for treatment of this wound.   Treatment of this wound was initiated in clinic on 5/12.  During this visit was also noted that his heel DTI had resolved, but that he had a new pressure injury to his left posterior lower extremity.     A new pressure injury was noted to patient's right upper buttock/lower back on 5/20/2022.  Wound was linear in shape, skin discolored but intact.     Abrasion noted to left anterior lower leg.  First observed in clinic on 7/22/2022.  Patient states he bumped his leg into his food tray.     Small DTI noted to patient's left lateral lower leg on 7/29/2022.  Skin intact but discolored.     Large area of deep tissue injury noted to patient's left exterior lower leg.  Patient denied any trauma to this area.  Skin intact.  Wound documented.    1/27/2023: Patient was admitted to [facility] from 1/23-1/25/2023 with gross hematuria. He underwent RICHARD which showed watchman device was in place and he was taken off of Xarelto. While hospitalized wound team was consulted. He was referred back to [facility] and home health upon discharge.    Patient was hospitalized at [facility] for pyelonephritis from 2/26 until 3/2/2023, admitted for fever and general malaise.  He was admitted and initially started on linezolid and meropenem for suspected  UTI and history of multidrug-resistant organisms.  Urine cultures were negative. ID was consulted, recommended CT of chest and abdomen,which were negative for acute findings. However, he was treated with 5 days total course of antibiotics for suspected UTI, and symptoms completely resolved.  During this admission, the inpatient wound team was consulted for treatment of his sacral and lower leg wounds.  A wound culture was taken from his left heel pressure ulcer, negative.  Once stabilized, he was discharged home and referred back to [facility] to resume treatment of his wounds.    Patient was hospitalized at [facility] from 12/11 until 12/23/2023, admitted for fever.  Wound infection suspected.  CT scan of abdomen and pelvis for evaluation of sacral pressure injury showed gas tracking down to the bone consistent with osteomyelitis.  He underwent I&D of right ischial ulcer (documented as buttock) with Dr. Bansal, medial tract leading to an abscess was identified.  Cavity was opened allowing it to drain into the main wound bed.  Wound VAC was placed and managed by wound team during this admission.  A bone scan of patient's left foot was also done, initially concerning for osteomyelitis.  Orthopedic surgery was consulted and did not recommend surgical intervention. ID consulted also, recommended the patient to receive IV ertapenem 1 g every 24 hours plus IV daptomycin 8 mg/kg every 24 hours through 1/22/2024.   He was discharged to [facility] on 1/23 for IV antibiotics and wound care.  From the LTAC he was discharged home on 1/22 with home health and referral back to [facility] to resume management of his wounds  .      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  Never used smokeless tobacco    Patient's problem list, allergies, and current medications reviewed and updated in Epic    Interval History:  Interval History thinned 7/29/2022.  Please see previous notes for complete interval history.   Interval History thinned 1/27/2023. Please see previous note for complete interval history.  Interval History thinned 3/3/2023.  Please see previous notes for complete interval history.    Interval History thinned 8/4/2023.  Please see previous notes for complete interval history.    Interval History thinned 1/31/2024.  Please see previous notes for complete interval history.    Interval History thinned 6/26/2024.  Please see previous notes for complete interval history.      6/19/2024: Clinic visit with Steve Hodges MD. Patient denies any fevers or chills. Reports he is tolerating Abx. He has appointment with ID later today to discuss OM treatment. He is tolerating wound VAC. Slight bone exposed bilaterally in ischial wounds, slightly worse.    6/26/2024 : Clinic visit with ADILSON Arthur, FNPEGGY-BC, CWDINESHN, CFTRACI.   Patient presents today with significant deterioration of his both ischial wounds, with increased depth of undermining.   He now has a PICC line, and will be starting outpatient infusions of ertapenem later today.  He states he is feeling well, denies fevers, chills, nausea, vomiting, cough or shortness of breath.  Due to deterioration of his wound, we did discuss possibly having him go over the hospital for surgical debridement and IV antibiotics.  He was hesitant to do so.  We agreed to see how he looks after a week or so IV antibiotics.  He is agreeable to minimizing time up in his wheelchair.  He also agrees to go to the emergency room immediately if he develops any signs or symptoms of infection.    7/10/2024: Clinic visit with Steve Hodges MD. Patient reports feeling in normal state of health. He missed  last appointment as he had fall out of wheelchair and was evaluated in ED. He denies any injuries from fall. Patient wounds are measuring slightly smaller. He continues on Ertapenem. Patient reports that he has appointment for seating evaluation from [facility] scheduled, but does not recall the date.    7/17/2024 : Clinic visit with ADILSON Arthur, HOLDEN, LILIYA VALERIO.   Anjum states he is feeling well.  Left ischial wound measures significantly smaller today, however measurements vary somewhat depending on pt's position.  Both wounds appear to be progressing slightly, with improving tissue quality.    7/24/2024 : Clinic visit with ADILSON Arthur, HOLDEN, IMAN, LILIYA.   Patient continues to feel well, offers no complaints.  Right ischial wound measures smaller, left ischial wound is larger.  Patient tolerating VAC without any difficulty.  Home health continues to see him in between clinic visits.  He is still receiving daily infusions of IV antibiotics, will be completing these in August.    7/31/2024 : Clinic visit with ADILSON Arthur, HOLDEN, IMAN, LILIYA.   Anjum continues to feel well, his wounds are slowly progressing.  Tolerating VAC.  He is on IV antibiotics for 1 more week.  Admits that he is up in his wheelchair for 10 to 14 hours/day.    8/7/2024 : Clinic visit with ADILSON Arthur FNP-BC, LILIYA VALERIO.   Anjum states he is feeling well today, offers no complaints.  Both wounds measure a bit smaller today, new granulation tissue noted.  He will be getting his last IV antibiotic infusion today.    8/14/2024 : Clinic visit with ADILSON Arthur FNP-BC, IMAN, LILIYA.   Patient continues to feel well.  He has completed his antibiotics, followed up with ID earlier this week, no further antibiotic therapy at this time.  Ischial wounds are slowly progressing.  Lower extremity wounds remain resolved.    8/21/2024 : Clinic visit with ADILSON Arthur FNP-BC, LILIYA VALERIO.   Anjum states he is feeling  well, offers no complaints.  His wounds are slowly progressing, increased granulation.    8/28/2024 : Clinic visit with ADILSON Arthur, HOLDEN, LILIYA VALERIO.   Turk states he is feeling well overall.  His wounds measure slightly smaller.  He has had some urinary symptoms, reports leakage from around his suprapubic catheter last night, and excessively full urine bag.  He has been in contact with his urologist office.  He also mentions that he has a recurring small scab to his nose, states that it falls off only to return shortly after.  This has been going on for several months.  I offered to refer him to dermatology.    9/11/2024 : Clinic visit with ADILSON Arthur, HOLDEN, LILIYA VALERIO.   Turk states he is feeling well.  He missed his appointment last week due to car troubles.  His vehicle is now running well.  Ischial wounds show some improvement, increased granulation tissue.  He does have a small reopening of left posterior lower leg wound, appears to be a small abrasion over area of scar tissue.    9/18/2024: Clinic visit with Steve Hodges MD. Patient reports doing ok. Denies any acute issues with wound VAC. Reports that he was fitted by [employer] for new seat cushion and is being manufactured, he is not sure when will be ready. Patient's wounds appears slightly improved. Left posterior leg wound remains open.    9/25/2024 : Clinic visit with ADILSON Arthur, HOLDEN, MIAN, LILIYA.   Patient states he is feeling well.  His wounds measure about the same.    10/2/2024: Clinic visit with HOLDEN Johnson CWON, LILIYA.  Pt denies fevers, chills, nausea, vomiting. Bilateral ischial ulcers increased in area.  Left IT quality overall worse compared to right IT.  Recommend Dakin's soak.  Continue with VAC to bilateral IT.    10/9/2024 : Clinic visit with ADILSON Arthur, HOLDEN, IMAN, LILIYA.   Patient continues to feel well overall.  His wounds measure about the same, no evidence of infection.   He does have some minor breakdown over the scar tissue of his sacrum which bears watching.  He has not heard from [employer] about his seat cushion, states he will contact them again.    10/16/2024 : Clinic visit with ADILSON Arthur, HOLDEN, IMAN, LILIYA.   Anjum continues to feel well.  His wounds measure slightly smaller.  Sacral wound just slightly open.  He called [employer] earlier this week, was told his new cushion is ready, and that they would deliver it next Monday.  He also states he is due for a new wheelchair, but has not yet become process.      10/23/2024 : Clinic visit with ADILSON Arthur, HOLDEN, IMAN, LILIYA.   Anjum's wounds present today with significantly more odor.  He states he is feeling fine, denies fevers, chills, nausea, vomiting, cough or shortness of breath.  Increased drainage noted.  Wounds measure about the same.  Culture collected in clinic today after debridement.  VAC placed on hold.  Wounds packed with Puracyn moistened silver Hydrofiber.   Patient reminded that he is to go to the emergency room if he notices any increased redness, swelling, drainage or odor, or if he develops fever, chills, nausea or vomiting.    He has a new cushion to his wheelchair.  States that he does not yet have a new wheelchair, will be picking on out the next few weeks.    10/30/2024 : Clinic visit with ADILSON Arthur, HOLDEN, IMAN, LILIYA.   Anjum states he is feeling well.  Wound odor still noticeable.  Culture collected last visit was positive for light growth of Proteus.  Given continued odor, will go ahead and prescribe short course of Augmentin, no other p.o. options available based on sensitivities.  Continue with Dakin's wound cleansing.  Home health to restart VAC on Friday 11/6/2024: Clinic visit with Steve Hodges MD. Patient reports doing well, denies any acute issues. Tolerating augmentin well without side effects. Odor much improved today. Wounds are improving slowly. Continue wound  VAC.    11/14/2024: Clinic visit with Steve Hodges MD. Patient reports doing ok. He was frustrated coming into clinic, was having trouble exiting his van. No evidence of wound infection today    11/20/2024: Clinic visit with Steve Hodges MD. Patient reports feeling in normal state of health. His ischial wounds stable and slowly improving. He has reopened sacral wound however. Denies any signs or symptoms of infection.    11/27/2024: Clinic visit with Steve Hodges MD. Patient reports doing well, denies any acute issues. Sacral wound measuring smaller. Right ischial wound smaller. Left ischial wound larger with increased slough and necrotic tissue at base of wound. Patient has contacted [employer] technician to evaluate seat due to reopening of sacrum, he is pending call back.    12/4/2024: Clinic visit with Steve Hodges MD. Patient reports doing well, denies any signs or symptoms of infection. Denies any issues with wound VAC. Sacrum has resolved. Right ischium wound larger with necrotic tissue, left ischium stable. He denies any traumatic events or any changes to his routine that would have increased pressure on right ischium besides time spent in chair during thanksgiving with family.    12/11/2024: Clinic visit with Steve Hodges MD. Patient reports doing well, denies any acute issues. Wounds are stable. No further necrosis of right ischium. Patient denies any signs or symptoms of infection.    12/18/2024: Clinic visit with Steve Hodges MD. Patient reports doing ok. Reports was diagnosed with UTI by urology, picking up Abx later today, thinks that he was prescribed Augmentin. Right ischial wound measuring larger, dusky tissue concerning for increased pressure. He reports that he has been up in wheelchair more this week with friend in town and has not been using tilt feature of chair as frequently. He was encouraged to spend more time offloading.   Due to holiday's, and dressings only going to be  changed 2x weekly, it is medically necessary to continue wound VAC for now as it would be severely problematic for patient to be sitting with saturated dressings during this period.    1/8/2025: Clinic visit with Steve Hodges MD. Patient reports chest congestion with low grade fevers for last few days. Denies any issues with wounds. He has increased necrosis of left IT pressure injury, he denies any changes in activity and is using tilt feature in chair every 30 min. Patient reports that he has appointment with [facility] next Friday to re-evaluate custom seat.    1/15/2025: Clinic visit with Steve Hodges MD. Patient returns to clinic following hospitalization for mycoplasma pneumonia.  Patient reports that he is feeling better denies any fevers or chills currently.  Patient has upcoming seating eval by [employer].  Wound VAC has been held since last week, wound seem to be slightly improved with holding VAC. Will continue to hold this this week.    1/22/2025: Clinic visit with Steve Hodges MD. Patient reports doing well, denies any acute issues. He had seat cushion re-evaluation and the technician noted that he had no hot spots when he was back far enough in chair, but if he slides forward then he is putting pressure on ischial tuberosities which explains the concern for pressure noted last few weeks. Patient will keep wheelchair slightly tilted back. Wounds with increased granulation tissue. Discontinue wound VAC and he will return to .    1/29/2025: Clinic visit with Steve Hodges MD. Patient reports doing ok, denies any signs or symptoms of infection. Patient assures me that he is sitting further back in chair as recommended by PT/wheel chair technician. Wounds are slowly improving, he reports less drainage. Will trial use of Hydrofiber instead of Enluxtra.    2/5/2025: Clinic visit with Steve Hodges MD. Patient reports doing well. Wounds are not overly macerated after holding Enluxtra,  continue hydrofiber.    2/12/2025: Clinic visit with Steve Hodges MD. Patient reports doing ok. Reports that Monday night his catheter was obstructed and caused leak saturating dressings. His group home changed and dried him, but dressings remained saturated. If something like this should occur again, patient will contact home health for dressing change.    2/19/2025 : Clinic visit with Kelly Sandy APRN, FNP-BC, CWDINESHN, CFTRACI.   Chart presents today saturated in urine due to leakage from his suprapubic catheter.  He states this is a frequent event due to bladder spasms.  He has seen his urologist several times, recently started getting Botox injections, does not feel this has helped.  I suggest that he consider getting a urostomy, would divert from bladder altogether, and if well created, would result in better containment of his urine.  He states he has an appointment with his urologist next month and will discuss with him.   His wounds continue to progress, but both measure smaller.    2/26/2025: Clinic visit with Steve Hodges MD. Patient reports doing well, denies any acute issues. Still having occasional leaking from catheter. Patient wounds measure about the same, however tissue quality has improved.     REVIEW OF SYSTEMS:   Unchanged from previous wound clinic assessment on 2/19/2025, except as noted in interval history above.      PHYSICAL EXAMINATION:   There were no vitals taken for this visit.  Physical Exam  Constitutional:       Appearance: He is obese.   Cardiovascular:      Rate and Rhythm: Normal rate.   Pulmonary:      Effort: Pulmonary effort is normal.   Abdominal:      Comments: Colostomy left lower quadrant   Genitourinary:     Comments: Suprapubic catheter to down drain   Skin:     Comments: Stage IV pressure injury to right ischium: Wound measuring the same, however tissue quality has improved.  Thin layer of slough to wound bed.  Moderate serosanguineous drainage, no odor.  No periwound  erythema or induration    Stage IV pressure injury of left ischium: Wound measuring the same, however tissue quality has improved.  Thin layer of slough to wound bed.  Moderate serosanguineous drainage, no odor.  No periwound erythema or induration    Stage IV pressure injury sacrum: Remains resolved    All other wounds remain healed, high risk for recurrence     Neurological:      Mental Status: He is alert and oriented to person, place, and time.   Psychiatric:         Mood and Affect: Mood normal.         WOUND ASSESSMENT  Wound 12/12/23 Pressure Injury Ischium Right (Active)   Wound Image    02/26/25 1500   Site Assessment Red;Tyhee 02/26/25 1500   Periwound Assessment Scar tissue 02/26/25 1500   Margins Unattached edges 02/26/25 1500   Closure Secondary intention 10/16/24 1700   Drainage Amount Large 02/26/25 1500   Drainage Description Serosanguineous 02/26/25 1500   Treatments Cleansed;Topical Lidocaine;Provider debridement;Site care 02/26/25 1500   Offloading/DME Other (comment) 02/26/25 1500   Wound Cleansing Hypochlorus Acid 02/26/25 1500   Periwound Protectant No-sting Skin Prep;Moisture Barrier 02/26/25 1500   Dressing Status Intact;Old drainage 10/02/24 1500   Dressing Changed Changed 01/22/25 1500   Dressing Cleansing/Solutions Normal Saline 02/26/25 1500   Dressing Options Hydrofera Blue Classic;Hydrofiber;Offloading Dressing - Sacral 02/26/25 1500   Dressing Change/Treatment Frequency Monday, Wednesday, Friday, and As Needed 02/26/25 1500   Wound Team Following Weekly 10/16/24 1700   WOUND NURSE ONLY - Pressure Injury Stage 4 02/26/25 1500   Wound Length (cm) 4 cm 02/26/25 1500   Wound Width (cm) 2 cm 02/26/25 1500   Wound Depth (cm) 0.3 cm 02/26/25 1500   Wound Surface Area (cm^2) 8 cm^2 02/26/25 1500   Wound Volume (cm^3) 2.4 cm^3 02/26/25 1500   Post-Procedure Length (cm) 4 cm 02/26/25 1500   Post-Procedure Width (cm) 2 cm 02/26/25 1500   Post-Procedure Depth (cm) 0.3 cm 02/26/25 1500    Post-Procedure Surface Area (cm^2) 8 cm^2 02/26/25 1500   Post-Procedure Volume (cm^3) 2.4 cm^3 02/26/25 1500   Wound Healing % 96 02/26/25 1500   Tunneling (cm) 0 cm 02/05/25 1530   Undermining (cm) 1.6 cm 02/26/25 1500   Undermining of Wound, 1st Location From 5 o'clock;To 7 o'clock 02/26/25 1500   Undermining (cm) - 2nd location 0.5 cm 02/19/25 1500   Undermining of Wound, 2nd Location From 7 o'clock;From 12 o'clock;To 2 o'clock 02/19/25 1500   Wound Odor Mild 02/26/25 1500   Exposed Structures None 02/26/25 1500   Number of days: 443       Wound 05/01/24 Pressure Injury Ischium Left (Active)   Wound Image    02/26/25 1500   Site Assessment Pink;Red 02/26/25 1500   Periwound Assessment Dry;Intact;Scar tissue 02/26/25 1500   Margins Unattached edges 02/26/25 1500   Drainage Amount Large 02/26/25 1500   Drainage Description Serosanguineous 02/26/25 1500   Treatments Cleansed;Topical Lidocaine;Provider debridement;Site care 02/26/25 1500   Offloading/DME Other (comment) 02/26/25 1500   Wound Cleansing Hypochlorus Acid 02/26/25 1500   Periwound Protectant Moisture Barrier;No-sting Skin Prep 02/26/25 1500   Dressing Changed Changed 02/26/25 1500   Dressing Cleansing/Solutions Normal Saline 02/26/25 1500   Dressing Options Hydrofera Blue Classic;Hydrofiber;Offloading Dressing - Sacral 02/26/25 1500   Dressing Change/Treatment Frequency Monday, Wednesday, Friday, and As Needed 02/26/25 1500   Wound Team Following Weekly 12/18/24 1700   WOUND NURSE ONLY - Pressure Injury Stage 4 02/26/25 1500   Wound Length (cm) 4.5 cm 02/26/25 1500   Wound Width (cm) 1.8 cm 02/26/25 1500   Wound Depth (cm) 1.1 cm 02/26/25 1500   Wound Surface Area (cm^2) 8.1 cm^2 02/26/25 1500   Wound Volume (cm^3) 8.91 cm^3 02/26/25 1500   Post-Procedure Length (cm) 4.5 cm 02/26/25 1500   Post-Procedure Width (cm) 1.9 cm 02/26/25 1500   Post-Procedure Depth (cm) 1.1 cm 02/26/25 1500   Post-Procedure Surface Area (cm^2) 8.55 cm^2 02/26/25 1500  "  Post-Procedure Volume (cm^3) 9.405 cm^3 02/26/25 1500   Wound Healing % -3950 02/26/25 1500   Tunneling (cm) 0 cm 02/26/25 1500   Undermining (cm) 0 cm 02/26/25 1500   Undermining of Wound, 1st Location From 4 o'clock;To 10 o'clock 12/18/24 1700   Wound Odor Mild 02/26/25 1500   Exposed Structures None 02/26/25 1500   Number of days: 302       Wound 01/09/25 Other (comment) Coccyx Left;Right (Active)   Number of days: 49       Wound 01/09/25 Pressure Injury Sacrum (Active)   Number of days: 49       Wound 01/09/25 Other (comment) Toe, 3rd;Toe, 4th Left (Active)   Number of days: 49       PROCEDURE: Excisional debridement of right and left ischial wounds  -2% viscous lidocaine applied topically to wound bed for approximately 5 minutes prior to debridement  -Curette used to debride wound bed.  Excisional debridement was performed to remove devitalized tissue until healthy, bleeding tissue was visualized.  Total area debrided was approximately 16.55 cm², including into undermined areas of wounds.  Tissue debrided into the muscle / fascia layer.    -Bleeding controlled with manual pressure.    -Wound care completed by wound RN, refer to flowsheet  -Patient tolerated the procedure well, without c/o pain or discomfort.    Pertinent Labs and Diagnostics:    Labs:     A1c: No results found for: \"HBA1C\"     [facility] results, 7/1/2022 (under media tab)    CRP 13    ESR 31      IMAGING:     X-ray left tib-fib ordered 2/16/2024 through [facility]    12/11/2023-CT of abdomen pelvis with contrast  IMPRESSION:   1.  Right ischial decubitus ulcer extending to bone with soft tissue gas tracking along the right perineum. Appearance suggesting osteomyelitis, consider component of necrotizing fasciitis as clinically appropriate.  2.  Small pericardial effusion  3.  Left adrenal nodule, density on prior noncontrast CT demonstrates adenoma.  4.  Hepatomegaly  5.  Enlarged prostate, workup and evaluation for causes of prostate " enlargement recommended as clinically appropriate.  6.  Atherosclerosis and atherosclerotic coronary artery disease    12/15/2023-bone scan of left foot  IMPRESSION:     1.  Mild increased activity in the LEFT 1st and 3rd toes on blood pool and delayed images possibly indicating inflammation/infection.  2.  No significant blood flow asymmetry.          VASCULAR STUDIES: No results found.    LAST  WOUND CULTURE:   Lab Results   Component Value Date/Time    CULTRSULT  02/21/2025 03:00 PM     Mixed enteric ade ,000 cfu/mL  3 or more organisms isolated, culture of doubtful  significance, please recollect.        PATHOLOGY  2/17/2023-bone fragment extracted from left lower extremity wound\\  FINAL DIAGNOSIS:     A. Left leg bone fragment at base of chronic wound:          Extensively degenerated fibrocartilaginous tissue with a rim of           fibrinopurulent debris          Correlate with culture findings            Comment: While no residual intact bone is identified, these           findings are suggestive of adjacent osteomyelitis.      ASSESSMENT AND PLAN:     1. Sacral decubitus ulcer, stage IV ([facility])  Comments: Ulcer first noted in early April 2022 as small open area which quickly enlarged.  This ulcer is present distal from previous sacral ulcer which healed after surgery in January.  Patient has history of flap reconstruction x2 to this area.    2/26/2025: Wound remains resolved  - Continue to protect area with pressure relieving sacral foam dressing.  -Patient does spend a lot of time up in his wheelchair, and wishes to continue to do so for his quality of life.  He lives independently, drives, and is involved with family activities.    -His presents today with a new cushion in his wheelchair.  Has yet to pick out a new wheelchair  - Known OM that was previously treated. CT scan done during recent hospitalization did not show OM of sacrum, however OM of the ischium noted.  -Patient is very well versed  in pressure relief strategies    Wound care: silicone adhesive foam dressing    2. Pressure injury of right ischium, stage 4 ([facility])  Comments: Abscess and OM found on CT during hospitalization in December 2023.  Patient underwent I&D with VAC placement.  IV antibiotics through 1/22/2024.    2/26/2025: Wound measures about the same, tissue quality continues to improve.  - Excisional debridement of nonviable tissue from wound in clinic today, medically necessary to promote wound healing  - VAC discontinued previously.  -Patient to return to clinic weekly for assessment, debridement, and dressing change.    -Home health to change dressing 2 times per week in between clinic visits.    -Patient is very well versed on pressure relief measures, has adequate surface on bed, alternating low air loss.    Wound care: Hydrofera Blue Classic, Hydrofiber, Silicone foam    3. Pressure injury of left ischium, stage 4 ([facility])    2/19/2025: Wound measuring about the same, tissue quality continues to improve  - Excisional debridement of wound in clinic today, medically necessary to promote wound healing.  - Patient to return to clinic weekly for assessment, debridement, and dressing change  -VAC has been discontinued  -Home health to change dressing 2 times per week in between clinic visits.    -Patient has new cushion in his wheelchair, see above  -Patient is very well versed on pressure relief measures, has adequate surface on bed, alternating low air loss.    Wound care: Hydrofera Blue Classic, Hydrofiber, Silicone foam    4. Other acute osteomyelitis, other site ([facility])    2/26/2025: Bone fragments removed during clinic visit in June were sent for pathology, polymicrobial, most concerning was an MDR ESBL Proteus.   -Patient completed IV antibiotics.  No further antibiotics at this time per ID  -Patient understands he has a very low threshold for infection/sepsis.  He understands he is to go to the emergency room immediately if he  begins to experience fevers, chills, nausea or vomiting, or if he notices radiating erythema or purulent drainage from his wounds.    5. Quadriplegia, C5-C7 incomplete ([facility])  Comments: Complicating factor.  Impaired mobility and sensation  -Patient is still spending 7-8 hours/day up in his wheelchair and knows to reposition frequently.  Wears heel float boots bilaterally at all times  - Patient has new custom wheelchair seat. He had refitting and appears he was not sitting back in chair enough which was causing undue pressure to IT. He is more aware of the correct positioning in chair.    6.  Leakage from urinary catheter, subsequent encounter    2/26/2025:   - Continues to have slightly urinary   -Recently started Botox injections, does not feel these have been helpful thus far  -Suggested that patient consider ileal conduit (urostomy) as possible solution.  He has an appointment with his urologist next month and will discuss it with him.      Please note that this note may have been created using voice recognition software. I have worked with technical experts from [employer] to optimize the interface.  I have made every reasonable attempt to correct obvious errors, but there may be errors of grammar and possibly content that I did not discover before finalizing the note.

## 2025-02-26 NOTE — PROGRESS NOTES
Home Health wound care orders entered into Epic and routed to [facility] via [employer].

## 2025-03-04 PROBLEM — K22.70 BARRETT'S ESOPHAGUS: Status: ACTIVE | Noted: 2025-03-04

## 2025-03-05 ENCOUNTER — OFFICE VISIT (OUTPATIENT)
Dept: WOUND CARE | Facility: MEDICAL CENTER | Age: 75
End: 2025-03-05
Payer: MEDICARE

## 2025-03-05 DIAGNOSIS — L89.154 SACRAL DECUBITUS ULCER, STAGE IV (HCC): ICD-10-CM

## 2025-03-05 DIAGNOSIS — L89.324 PRESSURE INJURY OF LEFT ISCHIUM, STAGE 4 (HCC): ICD-10-CM

## 2025-03-05 DIAGNOSIS — L89.314 PRESSURE INJURY OF RIGHT ISCHIUM, STAGE 4 (HCC): ICD-10-CM

## 2025-03-05 DIAGNOSIS — M86.18 OTHER ACUTE OSTEOMYELITIS, OTHER SITE (HCC): ICD-10-CM

## 2025-03-05 DIAGNOSIS — G82.54 QUADRIPLEGIA, C5-C7, INCOMPLETE (HCC): ICD-10-CM

## 2025-03-05 DIAGNOSIS — T83.038D: ICD-10-CM

## 2025-03-05 PROCEDURE — 11043 DBRDMT MUSC&/FSCA 1ST 20/<: CPT

## 2025-03-05 PROCEDURE — 11043 DBRDMT MUSC&/FSCA 1ST 20/<: CPT | Performed by: STUDENT IN AN ORGANIZED HEALTH CARE EDUCATION/TRAINING PROGRAM

## 2025-03-05 NOTE — PROGRESS NOTES
Provider Encounter- Pressure Injury        HISTORY OF PRESENT ILLNESS  Wound History:    START OF CARE IN CLINIC: 1/31/2024 (return to clinic after hospitalization)    REFERRING PROVIDER: Racquel York       WOUNDS-superior coccyx pressure injury, stage IV-resolved                                 Coccyx pressure injury, stage IV-resolved           Inferior coccyx pressure injury, stage IV resolved                                 Right ischial pressure injury, stage IV                                 Left heel pressure injury, recurring stage III-resolved                                 Left posterior lower leg/calf-stage III-resolved                                 Left medial lower leg surgical wound dehiscence-resolved, reopens frequently-resolved            Left ischial pressure injury, stage IV-first observed in clinic 5/1/2024          HISTORY: Patient with history of incomplete quadriplegia referred to [facility] for treatment of a stage IV pressure injury.  He has a history of previous pressure injuries to this area, and underwent muscle flaps in 2019, and then again in 2020.  He was seen in the wound clinic in November 2021 for an ulcer proximal from his current ulcer, and pressure injuries to his left posterior lower leg and left heel.  At that time, it was discovered that the patient had retained VAC foam embedded in the wound bed of the sacral wound.  Attempts were made to get him back to his plastic surgeon, though unsuccessful.  In January he underwent surgical removal of VAC sponge along with excisional debridement of his sacral wound by Dr. Chaves.  After the surgery, his wound went on to heal without incident.   In early April 2022, his home health nurse noted a new sacral ulcer, below the previous ulcer which quickly tripled in size over the following weeks.  The ulcer to his left medial lower leg had also deteriorated, with bone visible at the base..  He was hospitalized from 4/22 until 4/27/2022 and  underwent surgery with Dr. Raman on 4/26 for irrigation and debridement of multiple compartments of the left lower extremity, bone excision, and complex closure of chronic wound using biologic skin substitute.   His sacrococcygeal wound was not surgically addressed during this admission.  He was discharged back to his group home, with home health, and referral to outpatient wound clinic for his sacral wound.  He was instructed to follow-up with his surgeon for his lower leg wound.       Postoperatively, the left medial lower leg incision dehisced.  He was seen by his surgeon at [facility] on 5/11.  The surgeon opted to leave remaining sutures in place, and refer him to the wound clinic for treatment of this wound.   Treatment of this wound was initiated in clinic on 5/12.  During this visit was also noted that his heel DTI had resolved, but that he had a new pressure injury to his left posterior lower extremity.     A new pressure injury was noted to patient's right upper buttock/lower back on 5/20/2022.  Wound was linear in shape, skin discolored but intact.     Abrasion noted to left anterior lower leg.  First observed in clinic on 7/22/2022.  Patient states he bumped his leg into his food tray.     Small DTI noted to patient's left lateral lower leg on 7/29/2022.  Skin intact but discolored.     Large area of deep tissue injury noted to patient's left exterior lower leg.  Patient denied any trauma to this area.  Skin intact.  Wound documented.    1/27/2023: Patient was admitted to [facility] from 1/23-1/25/2023 with gross hematuria. He underwent RICHARD which showed watchman device was in place and he was taken off of Xarelto. While hospitalized wound team was consulted. He was referred back to [facility] and home health upon discharge.    Patient was hospitalized at [facility] for pyelonephritis from 2/26 until 3/2/2023, admitted for fever and general malaise.  He was admitted and initially started on linezolid and meropenem for suspected  UTI and history of multidrug-resistant organisms.  Urine cultures were negative. ID was consulted, recommended CT of chest and abdomen,which were negative for acute findings. However, he was treated with 5 days total course of antibiotics for suspected UTI, and symptoms completely resolved.  During this admission, the inpatient wound team was consulted for treatment of his sacral and lower leg wounds.  A wound culture was taken from his left heel pressure ulcer, negative.  Once stabilized, he was discharged home and referred back to [facility] to resume treatment of his wounds.    Patient was hospitalized at [facility] from 12/11 until 12/23/2023, admitted for fever.  Wound infection suspected.  CT scan of abdomen and pelvis for evaluation of sacral pressure injury showed gas tracking down to the bone consistent with osteomyelitis.  He underwent I&D of right ischial ulcer (documented as buttock) with Dr. Bansal, medial tract leading to an abscess was identified.  Cavity was opened allowing it to drain into the main wound bed.  Wound VAC was placed and managed by wound team during this admission.  A bone scan of patient's left foot was also done, initially concerning for osteomyelitis.  Orthopedic surgery was consulted and did not recommend surgical intervention. ID consulted also, recommended the patient to receive IV ertapenem 1 g every 24 hours plus IV daptomycin 8 mg/kg every 24 hours through 1/22/2024.   He was discharged to [facility] on 1/23 for IV antibiotics and wound care.  From the LTAC he was discharged home on 1/22 with home health and referral back to [facility] to resume management of his wounds  .      Pertinent Medical History: Incomplete quadriplegia, history of stage IV pressure injuries, history of flap procedures to sacral pressure injuries, osteomyelitis, obesity, colostomy in place   Contributing factors: Immobility and Obesity, impaired sensation    Personal support: Attendant-staff at halfway and home health  nursing    TOBACCO USE:   Former smoker, quit in 1977.  Never used smokeless tobacco    Patient's problem list, allergies, and current medications reviewed and updated in Epic    Interval History:  Interval History thinned 7/29/2022.  Please see previous notes for complete interval history.   Interval History thinned 1/27/2023. Please see previous note for complete interval history.  Interval History thinned 3/3/2023.  Please see previous notes for complete interval history.    Interval History thinned 8/4/2023.  Please see previous notes for complete interval history.    Interval History thinned 1/31/2024.  Please see previous notes for complete interval history.    Interval History thinned 6/26/2024.  Please see previous notes for complete interval history.      6/19/2024: Clinic visit with Steve Hodges MD. Patient denies any fevers or chills. Reports he is tolerating Abx. He has appointment with ID later today to discuss OM treatment. He is tolerating wound VAC. Slight bone exposed bilaterally in ischial wounds, slightly worse.    6/26/2024 : Clinic visit with ADILSON Arthur, FNPEGGY-BC, CWDINESHN, CFTRACI.   Patient presents today with significant deterioration of his both ischial wounds, with increased depth of undermining.   He now has a PICC line, and will be starting outpatient infusions of ertapenem later today.  He states he is feeling well, denies fevers, chills, nausea, vomiting, cough or shortness of breath.  Due to deterioration of his wound, we did discuss possibly having him go over the hospital for surgical debridement and IV antibiotics.  He was hesitant to do so.  We agreed to see how he looks after a week or so IV antibiotics.  He is agreeable to minimizing time up in his wheelchair.  He also agrees to go to the emergency room immediately if he develops any signs or symptoms of infection.    7/10/2024: Clinic visit with Steve Hodges MD. Patient reports feeling in normal state of health. He missed  last appointment as he had fall out of wheelchair and was evaluated in ED. He denies any injuries from fall. Patient wounds are measuring slightly smaller. He continues on Ertapenem. Patient reports that he has appointment for seating evaluation from [facility] scheduled, but does not recall the date.    7/17/2024 : Clinic visit with ADILSON Arthur, HOLDEN, LILIYA VALERIO.   Anjum states he is feeling well.  Left ischial wound measures significantly smaller today, however measurements vary somewhat depending on pt's position.  Both wounds appear to be progressing slightly, with improving tissue quality.    7/24/2024 : Clinic visit with ADILSON Arthur, HOLDEN, IMAN, LILIYA.   Patient continues to feel well, offers no complaints.  Right ischial wound measures smaller, left ischial wound is larger.  Patient tolerating VAC without any difficulty.  Home health continues to see him in between clinic visits.  He is still receiving daily infusions of IV antibiotics, will be completing these in August.    7/31/2024 : Clinic visit with ADILSON Arthur, HOLDEN, IMAN, LILIYA.   Anjum continues to feel well, his wounds are slowly progressing.  Tolerating VAC.  He is on IV antibiotics for 1 more week.  Admits that he is up in his wheelchair for 10 to 14 hours/day.    8/7/2024 : Clinic visit with ADILSON Arthur FNP-BC, LILIYA VALERIO.   Anjum states he is feeling well today, offers no complaints.  Both wounds measure a bit smaller today, new granulation tissue noted.  He will be getting his last IV antibiotic infusion today.    8/14/2024 : Clinic visit with ADILSON Arthur FNP-BC, IMAN, LILIYA.   Patient continues to feel well.  He has completed his antibiotics, followed up with ID earlier this week, no further antibiotic therapy at this time.  Ischial wounds are slowly progressing.  Lower extremity wounds remain resolved.    8/21/2024 : Clinic visit with ADILSON Arthur FNP-BC, LILIYA VALERIO.   Anjum states he is feeling  well, offers no complaints.  His wounds are slowly progressing, increased granulation.    8/28/2024 : Clinic visit with ADILSON Arthur, HOLDEN, LILIYA VALERIO.   Turk states he is feeling well overall.  His wounds measure slightly smaller.  He has had some urinary symptoms, reports leakage from around his suprapubic catheter last night, and excessively full urine bag.  He has been in contact with his urologist office.  He also mentions that he has a recurring small scab to his nose, states that it falls off only to return shortly after.  This has been going on for several months.  I offered to refer him to dermatology.    9/11/2024 : Clinic visit with ADILSON Arthur, HOLDEN, LILIYA VALERIO.   Turk states he is feeling well.  He missed his appointment last week due to car troubles.  His vehicle is now running well.  Ischial wounds show some improvement, increased granulation tissue.  He does have a small reopening of left posterior lower leg wound, appears to be a small abrasion over area of scar tissue.    9/18/2024: Clinic visit with Steve Hodges MD. Patient reports doing ok. Denies any acute issues with wound VAC. Reports that he was fitted by [employer] for new seat cushion and is being manufactured, he is not sure when will be ready. Patient's wounds appears slightly improved. Left posterior leg wound remains open.    9/25/2024 : Clinic visit with ADILSON Arthur, HOLDEN, IMAN, LILIYA.   Patient states he is feeling well.  His wounds measure about the same.    10/2/2024: Clinic visit with HOLDEN Johnson CWON, LILIYA.  Pt denies fevers, chills, nausea, vomiting. Bilateral ischial ulcers increased in area.  Left IT quality overall worse compared to right IT.  Recommend Dakin's soak.  Continue with VAC to bilateral IT.    10/9/2024 : Clinic visit with ADILSON Arthur, HOLDEN, IMAN, LILIYA.   Patient continues to feel well overall.  His wounds measure about the same, no evidence of infection.   He does have some minor breakdown over the scar tissue of his sacrum which bears watching.  He has not heard from [employer] about his seat cushion, states he will contact them again.    10/16/2024 : Clinic visit with ADILSON Arthur, HOLDEN, IMAN, LILIYA.   Anjum continues to feel well.  His wounds measure slightly smaller.  Sacral wound just slightly open.  He called [employer] earlier this week, was told his new cushion is ready, and that they would deliver it next Monday.  He also states he is due for a new wheelchair, but has not yet become process.      10/23/2024 : Clinic visit with ADILSON Arthur, HOLDEN, IMAN, LILIYA.   Anjum's wounds present today with significantly more odor.  He states he is feeling fine, denies fevers, chills, nausea, vomiting, cough or shortness of breath.  Increased drainage noted.  Wounds measure about the same.  Culture collected in clinic today after debridement.  VAC placed on hold.  Wounds packed with Puracyn moistened silver Hydrofiber.   Patient reminded that he is to go to the emergency room if he notices any increased redness, swelling, drainage or odor, or if he develops fever, chills, nausea or vomiting.    He has a new cushion to his wheelchair.  States that he does not yet have a new wheelchair, will be picking on out the next few weeks.    10/30/2024 : Clinic visit with ADILSON Arthur, HOLDEN, IMAN, LILIYA.   Anjum states he is feeling well.  Wound odor still noticeable.  Culture collected last visit was positive for light growth of Proteus.  Given continued odor, will go ahead and prescribe short course of Augmentin, no other p.o. options available based on sensitivities.  Continue with Dakin's wound cleansing.  Home health to restart VAC on Friday 11/6/2024: Clinic visit with Steve Hodges MD. Patient reports doing well, denies any acute issues. Tolerating augmentin well without side effects. Odor much improved today. Wounds are improving slowly. Continue wound  VAC.    11/14/2024: Clinic visit with Steve Hodges MD. Patient reports doing ok. He was frustrated coming into clinic, was having trouble exiting his van. No evidence of wound infection today    11/20/2024: Clinic visit with Steve Hodges MD. Patient reports feeling in normal state of health. His ischial wounds stable and slowly improving. He has reopened sacral wound however. Denies any signs or symptoms of infection.    11/27/2024: Clinic visit with Steve Hodges MD. Patient reports doing well, denies any acute issues. Sacral wound measuring smaller. Right ischial wound smaller. Left ischial wound larger with increased slough and necrotic tissue at base of wound. Patient has contacted [employer] technician to evaluate seat due to reopening of sacrum, he is pending call back.    12/4/2024: Clinic visit with Steve Hodges MD. Patient reports doing well, denies any signs or symptoms of infection. Denies any issues with wound VAC. Sacrum has resolved. Right ischium wound larger with necrotic tissue, left ischium stable. He denies any traumatic events or any changes to his routine that would have increased pressure on right ischium besides time spent in chair during thanksgiving with family.    12/11/2024: Clinic visit with Steve Hodges MD. Patient reports doing well, denies any acute issues. Wounds are stable. No further necrosis of right ischium. Patient denies any signs or symptoms of infection.    12/18/2024: Clinic visit with Steve Hodges MD. Patient reports doing ok. Reports was diagnosed with UTI by urology, picking up Abx later today, thinks that he was prescribed Augmentin. Right ischial wound measuring larger, dusky tissue concerning for increased pressure. He reports that he has been up in wheelchair more this week with friend in town and has not been using tilt feature of chair as frequently. He was encouraged to spend more time offloading.   Due to holiday's, and dressings only going to be  changed 2x weekly, it is medically necessary to continue wound VAC for now as it would be severely problematic for patient to be sitting with saturated dressings during this period.    1/8/2025: Clinic visit with Steve Hodges MD. Patient reports chest congestion with low grade fevers for last few days. Denies any issues with wounds. He has increased necrosis of left IT pressure injury, he denies any changes in activity and is using tilt feature in chair every 30 min. Patient reports that he has appointment with [facility] next Friday to re-evaluate custom seat.    1/15/2025: Clinic visit with Steve Hodges MD. Patient returns to clinic following hospitalization for mycoplasma pneumonia.  Patient reports that he is feeling better denies any fevers or chills currently.  Patient has upcoming seating eval by [employer].  Wound VAC has been held since last week, wound seem to be slightly improved with holding VAC. Will continue to hold this this week.    1/22/2025: Clinic visit with Steve Hodges MD. Patient reports doing well, denies any acute issues. He had seat cushion re-evaluation and the technician noted that he had no hot spots when he was back far enough in chair, but if he slides forward then he is putting pressure on ischial tuberosities which explains the concern for pressure noted last few weeks. Patient will keep wheelchair slightly tilted back. Wounds with increased granulation tissue. Discontinue wound VAC and he will return to .    1/29/2025: Clinic visit with Steve Hodges MD. Patient reports doing ok, denies any signs or symptoms of infection. Patient assures me that he is sitting further back in chair as recommended by PT/wheel chair technician. Wounds are slowly improving, he reports less drainage. Will trial use of Hydrofiber instead of Enluxtra.    2/5/2025: Clinic visit with Steve Hodges MD. Patient reports doing well. Wounds are not overly macerated after holding Enluxtra,  continue hydrofiber.    2/12/2025: Clinic visit with Steve Hodgse MD. Patient reports doing ok. Reports that Monday night his catheter was obstructed and caused leak saturating dressings. His group home changed and dried him, but dressings remained saturated. If something like this should occur again, patient will contact home health for dressing change.    2/19/2025 : Clinic visit with Kelly Sandy, APRN, FNP-BC, CWDINESHN, CFTRACI.   Chart presents today saturated in urine due to leakage from his suprapubic catheter.  He states this is a frequent event due to bladder spasms.  He has seen his urologist several times, recently started getting Botox injections, does not feel this has helped.  I suggest that he consider getting a urostomy, would divert from bladder altogether, and if well created, would result in better containment of his urine.  He states he has an appointment with his urologist next month and will discuss with him.   His wounds continue to progress, but both measure smaller.    2/26/2025: Clinic visit with Steve Hodges MD. Patient reports doing well, denies any acute issues. Still having occasional leaking from catheter. Patient wounds measure about the same, however tissue quality has improved.     3/5/2025: Clinic visit with Steve Hodges MD. Patient reports doing well, denies any acute issues. Patients wounds are measuring smaller.  Right ischium wound is hypergranular.    REVIEW OF SYSTEMS:   Unchanged from previous wound clinic assessment on 2/26/2025, except as noted in interval history above.      PHYSICAL EXAMINATION:   There were no vitals taken for this visit.  Physical Exam  Constitutional:       Appearance: He is obese.   Cardiovascular:      Rate and Rhythm: Normal rate.   Pulmonary:      Effort: Pulmonary effort is normal.   Abdominal:      Comments: Colostomy left lower quadrant   Genitourinary:     Comments: Suprapubic catheter to down drain   Skin:     Comments: Stage IV pressure  injury to right ischium: Wound measuring smaller, tissue hypergranular.  Moderate serosanguineous drainage, no odor.  No periwound erythema or induration    Stage IV pressure injury of left ischium: Wound measuring smaller, tissue quality improving but continues to have some undermining with agranular tissue. No evidence of infection.    Stage IV pressure injury sacrum: Remains resolved    All other wounds remain healed, high risk for recurrence     Neurological:      Mental Status: He is alert and oriented to person, place, and time.   Psychiatric:         Mood and Affect: Mood normal.         WOUND ASSESSMENT  Wound 12/12/23 Pressure Injury Ischium Right (Active)   Wound Image    03/05/25 1430   Site Assessment Pink;Red;Hypergranulation 03/05/25 1430   Periwound Assessment Scar tissue 03/05/25 1430   Margins Unattached edges 03/05/25 1430   Closure Secondary intention 10/16/24 1700   Drainage Amount Large 03/05/25 1430   Drainage Description Serosanguineous 03/05/25 1430   Treatments Cleansed;Provider debridement;Site care 03/05/25 1430   Offloading/DME Other (comment) 03/05/25 1430   Wound Cleansing Hypochlorus Acid 03/05/25 1430   Periwound Protectant No-sting Skin Prep;Barrier Paste 03/05/25 1430   Dressing Changed Changed 03/05/25 1430   Dressing Cleansing/Solutions Normal Saline 03/05/25 1430   Dressing Options Hydrofera Blue Classic;Hydrofiber;Offloading Dressing - Sacral 03/05/25 1430   Dressing Change/Treatment Frequency Monday, Wednesday, Friday, and As Needed 03/05/25 1430   Wound Team Following Weekly 10/16/24 1700   WOUND NURSE ONLY - Pressure Injury Stage 4 03/05/25 1430   Non-staged Wound Description Not applicable 03/05/25 1430   Wound Length (cm) 4 cm 03/05/25 1430   Wound Width (cm) 1.5 cm 03/05/25 1430   Wound Depth (cm) 0.3 cm 03/05/25 1430   Wound Surface Area (cm^2) 6 cm^2 03/05/25 1430   Wound Volume (cm^3) 1.8 cm^3 03/05/25 1430   Post-Procedure Length (cm) 4.2 cm 03/05/25 1430    Post-Procedure Width (cm) 1.7 cm 03/05/25 1430   Post-Procedure Depth (cm) 0.3 cm 03/05/25 1430   Post-Procedure Surface Area (cm^2) 7.14 cm^2 03/05/25 1430   Post-Procedure Volume (cm^3) 2.142 cm^3 03/05/25 1430   Wound Healing % 97 03/05/25 1430   Tunneling (cm) 0 cm 03/05/25 1430   Undermining (cm) 1 cm 03/05/25 1430   Undermining of Wound, 1st Location From 5 o'clock;To 7 o'clock 03/05/25 1430   Undermining (cm) - 2nd location 0.5 cm 02/19/25 1500   Undermining of Wound, 2nd Location From 7 o'clock;From 12 o'clock;To 2 o'clock 02/19/25 1500   Wound Odor Mild 03/05/25 1430   Exposed Structures None 03/05/25 1430   Number of days: 449       Wound 05/01/24 Pressure Injury Ischium Left (Active)   Wound Image    03/05/25 1430   Site Assessment Pink;Red 03/05/25 1430   Periwound Assessment Scar tissue 03/05/25 1430   Margins Unattached edges 03/05/25 1430   Drainage Amount Large 03/05/25 1430   Drainage Description Serosanguineous 03/05/25 1430   Treatments Cleansed;Provider debridement;Site care 03/05/25 1430   Offloading/DME Other (comment) 03/05/25 1430   Wound Cleansing Hypochlorus Acid 03/05/25 1430   Periwound Protectant No-sting Skin Prep;Barrier Paste 03/05/25 1430   Dressing Changed Changed 03/05/25 1430   Dressing Cleansing/Solutions Normal Saline 03/05/25 1430   Dressing Options Hydrofera Blue Classic;Hydrofiber;Offloading Dressing - Sacral 03/05/25 1430   Dressing Change/Treatment Frequency Monday, Wednesday, Friday, and As Needed 03/05/25 1430   Wound Team Following Weekly 12/18/24 1700   WOUND NURSE ONLY - Pressure Injury Stage 4 03/05/25 1430   Non-staged Wound Description Not applicable 03/05/25 1430   Wound Length (cm) 4.6 cm 03/05/25 1430   Wound Width (cm) 1.1 cm 03/05/25 1430   Wound Depth (cm) 0.7 cm 03/05/25 1430   Wound Surface Area (cm^2) 5.06 cm^2 03/05/25 1430   Wound Volume (cm^3) 3.542 cm^3 03/05/25 1430   Post-Procedure Length (cm) 4.7 cm 03/05/25 1430   Post-Procedure Width (cm) 1.1 cm  "03/05/25 1430   Post-Procedure Depth (cm) 0.7 cm 03/05/25 1430   Post-Procedure Surface Area (cm^2) 5.17 cm^2 03/05/25 1430   Post-Procedure Volume (cm^3) 3.619 cm^3 03/05/25 1430   Wound Healing % -1510 03/05/25 1430   Tunneling (cm) 0 cm 03/05/25 1430   Undermining (cm) 0 cm 03/05/25 1430   Undermining of Wound, 1st Location From 4 o'clock;To 10 o'clock 12/18/24 1700   Wound Odor Mild 03/05/25 1430   Exposed Structures None 03/05/25 1430   Number of days: 308       Wound 01/09/25 Other (comment) Coccyx Left;Right (Active)   Number of days: 55       Wound 01/09/25 Pressure Injury Sacrum (Active)   Number of days: 55       Wound 01/09/25 Other (comment) Toe, 3rd;Toe, 4th Left (Active)   Number of days: 55       PROCEDURE: Excisional debridement of right and left ischial wounds  -2% viscous lidocaine applied topically to wound bed for approximately 5 minutes prior to debridement  -Curette used to debride wound bed.  Excisional debridement was performed to remove devitalized tissue until healthy, bleeding tissue was visualized.  Total area debrided was approximately 12.31 cm², including into undermined areas of wounds.  Tissue debrided into the muscle / fascia layer.    -Bleeding controlled with manual pressure.  - Hypergranulation tissue was chemically cauterized   -Wound care completed by wound RN, refer to flowsheet  -Patient tolerated the procedure well, without c/o pain or discomfort.    Pertinent Labs and Diagnostics:    Labs:     A1c: No results found for: \"HBA1C\"     [facility] results, 7/1/2022 (under media tab)    CRP 13    ESR 31      IMAGING:     X-ray left tib-fib ordered 2/16/2024 through [facility]    12/11/2023-CT of abdomen pelvis with contrast  IMPRESSION:   1.  Right ischial decubitus ulcer extending to bone with soft tissue gas tracking along the right perineum. Appearance suggesting osteomyelitis, consider component of necrotizing fasciitis as clinically appropriate.  2.  Small pericardial " effusion  3.  Left adrenal nodule, density on prior noncontrast CT demonstrates adenoma.  4.  Hepatomegaly  5.  Enlarged prostate, workup and evaluation for causes of prostate enlargement recommended as clinically appropriate.  6.  Atherosclerosis and atherosclerotic coronary artery disease    12/15/2023-bone scan of left foot  IMPRESSION:     1.  Mild increased activity in the LEFT 1st and 3rd toes on blood pool and delayed images possibly indicating inflammation/infection.  2.  No significant blood flow asymmetry.          VASCULAR STUDIES: No results found.    LAST  WOUND CULTURE:   Lab Results   Component Value Date/Time    CULTRSULT  02/21/2025 03:00 PM     Mixed enteric ade ,000 cfu/mL  3 or more organisms isolated, culture of doubtful  significance, please recollect.        PATHOLOGY  2/17/2023-bone fragment extracted from left lower extremity wound\\  FINAL DIAGNOSIS:     A. Left leg bone fragment at base of chronic wound:          Extensively degenerated fibrocartilaginous tissue with a rim of           fibrinopurulent debris          Correlate with culture findings            Comment: While no residual intact bone is identified, these           findings are suggestive of adjacent osteomyelitis.      ASSESSMENT AND PLAN:     1. Sacral decubitus ulcer, stage IV (HCC)  Comments: Ulcer first noted in early April 2022 as small open area which quickly enlarged.  This ulcer is present distal from previous sacral ulcer which healed after surgery in January.  Patient has history of flap reconstruction x2 to this area.    3/5/2025: Wound remains resolved  - Continue to protect area with pressure relieving sacral foam dressing.  -Patient does spend a lot of time up in his wheelchair, and wishes to continue to do so for his quality of life.  He lives independently, drives, and is involved with family activities.    -His presents today with a new cushion in his wheelchair.  Has yet to pick out a new  wheelchair  - Known OM that was previously treated. CT scan done during recent hospitalization did not show OM of sacrum, however OM of the ischium noted.  -Patient is very well versed in pressure relief strategies    Wound care: silicone adhesive foam dressing    2. Pressure injury of right ischium, stage 4 ([facility])  Comments: Abscess and OM found on CT during hospitalization in December 2023.  Patient underwent I&D with VAC placement.  IV antibiotics through 1/22/2024.    3/5/2025: Wound measuring smaller, tissue is hypergranular.  - Excisional debridement of nonviable tissue from wound in clinic today, medically necessary to promote wound healing  - VAC discontinued previously.  -Patient to return to clinic weekly for assessment, debridement, and dressing change.    -Home health to change dressing 2 times per week in between clinic visits.    -Patient is very well versed on pressure relief measures, has adequate surface on bed, alternating low air loss.    Wound care: Hydrofera Blue Classic, Hydrofiber, Silicone foam    3. Pressure injury of left ischium, stage 4 ([facility])    3/5/2025: Wound measuring smaller, tissue areas of hypergranulation tissue  - Excisional debridement of wound in clinic today, medically necessary to promote wound healing.  - Patient to return to clinic weekly for assessment, debridement, and dressing change  -VAC has been discontinued  -Home health to change dressing 2 times per week in between clinic visits.    -Patient has new cushion in his wheelchair, see above  -Patient is very well versed on pressure relief measures, has adequate surface on bed, alternating low air loss.    Wound care: Hydrofera Blue Classic, Hydrofiber, Silicone foam    4. Other acute osteomyelitis, other site ([facility])    3/5/2025: Bone fragments removed during clinic visit in June were sent for pathology, polymicrobial, most concerning was an MDR ESBL Proteus.   -Patient completed IV antibiotics.  No further antibiotics at  this time per ID  -Patient understands he has a very low threshold for infection/sepsis.  He understands he is to go to the emergency room immediately if he begins to experience fevers, chills, nausea or vomiting, or if he notices radiating erythema or purulent drainage from his wounds.    5. Quadriplegia, C5-C7 incomplete (HCC)  Comments: Complicating factor.  Impaired mobility and sensation  -Patient is still spending 7-8 hours/day up in his wheelchair and knows to reposition frequently.  Wears heel float boots bilaterally at all times  - Patient has new custom wheelchair seat. He had refitting and appears he was not sitting back in chair enough which was causing undue pressure to IT. He is more aware of the correct positioning in chair.    6.  Leakage from urinary catheter, subsequent encounter    3/5/2025:   - Continues to have slightly urinary leakage  -Recently started Botox injections, does not feel these have been helpful thus far  -Suggested that patient consider ileal conduit (urostomy) as possible solution.  He has an appointment with his urologist and will discuss it with him.      Please note that this note may have been created using voice recognition software. I have worked with technical experts from "[employer] to optimize the interface.  I have made every reasonable attempt to correct obvious errors, but there may be errors of grammar and possibly content that I did not discover before finalizing the note.

## 2025-03-05 NOTE — PROGRESS NOTES
Home Health wound care orders entered into Epic and routed to [facility] via [facility].

## 2025-03-05 NOTE — PATIENT INSTRUCTIONS
-Keep your wound dressing clean, dry, and intact. Only change dressing if it's over saturated, soiled or falls off.     -Should you experience any significant changes in your wound(s), such as infection (redness, swelling, localized heat, increased pain, fever > 101 F, chills) or have any questions regarding your home care instructions, please contact the wound center at [phone]. If after hours, contact your primary care physician or go to the hospital emergency room.

## 2025-03-12 ENCOUNTER — OFFICE VISIT (OUTPATIENT)
Dept: WOUND CARE | Facility: MEDICAL CENTER | Age: 75
End: 2025-03-12
Payer: MEDICARE

## 2025-03-12 DIAGNOSIS — L89.154 SACRAL DECUBITUS ULCER, STAGE IV (HCC): ICD-10-CM

## 2025-03-12 DIAGNOSIS — M86.18 OTHER ACUTE OSTEOMYELITIS, OTHER SITE (HCC): ICD-10-CM

## 2025-03-12 DIAGNOSIS — T83.038D: ICD-10-CM

## 2025-03-12 DIAGNOSIS — L89.314 PRESSURE INJURY OF RIGHT ISCHIUM, STAGE 4 (HCC): ICD-10-CM

## 2025-03-12 DIAGNOSIS — L89.324 PRESSURE INJURY OF LEFT ISCHIUM, STAGE 4 (HCC): ICD-10-CM

## 2025-03-12 DIAGNOSIS — G82.54 QUADRIPLEGIA, C5-C7, INCOMPLETE (HCC): ICD-10-CM

## 2025-03-12 PROCEDURE — 11043 DBRDMT MUSC&/FSCA 1ST 20/<: CPT

## 2025-03-12 PROCEDURE — 11042 DBRDMT SUBQ TIS 1ST 20SQCM/<: CPT

## 2025-03-12 PROCEDURE — 11043 DBRDMT MUSC&/FSCA 1ST 20/<: CPT | Performed by: NURSE PRACTITIONER

## 2025-03-12 NOTE — PATIENT INSTRUCTIONS
-Keep dressings clean and dry. Change dressings if they become over saturated, soiled or fall off. Otherwise, your home health nurse will change your dressings between wound clinic visits.    -If you need to change your dressings at home: Wash your wounds with normal saline, wound cleanser, or unscented soap and water prior to applying your new dressings. Please avoid cleansing with hydrogen peroxide or rubbing alcohol. Hydrogen peroxide and rubbing alcohol are toxic to new tissue and skin cells.    -Should you experience any significant changes in your wounds, such as signs of infection (increasing redness, swelling, localized heat, increased pain, fever > 101 F, chills) or have any questions regarding your home care instructions, please contact the wound center at [phone]. If after hours, contact your primary care physician or go to the hospital emergency room.     -If you are admitted to any hospital, you will need a new referral to come back to the wound clinic and any scheduled appointments that you already have, may be cancelled.     -If you are 5 or more minutes late for an appointment, we reserve the right to cancel and reschedule that appointment. Additionally, if you are habitually late or not showing (3 late cancellations and/or no shows), we reserve the right to cancel your remaining appointments and it will be your responsibility to obtain a new referral if services are still needed.

## 2025-03-12 NOTE — PROGRESS NOTES
Provider Encounter- Pressure Injury        HISTORY OF PRESENT ILLNESS  Wound History:    START OF CARE IN CLINIC: 1/31/2024 (return to clinic after hospitalization)    REFERRING PROVIDER: Racquel York       WOUNDS-superior coccyx pressure injury, stage IV-resolved                                 Coccyx pressure injury, stage IV-resolved           Inferior coccyx pressure injury, stage IV resolved                                 Right ischial pressure injury, stage IV                                 Left heel pressure injury, recurring stage III-resolved                                 Left posterior lower leg/calf-stage III-resolved                                 Left medial lower leg surgical wound dehiscence-resolved, reopens frequently-resolved            Left ischial pressure injury, stage IV-first observed in clinic 5/1/2024          HISTORY: Patient with history of incomplete quadriplegia referred to [facility] for treatment of a stage IV pressure injury.  He has a history of previous pressure injuries to this area, and underwent muscle flaps in 2019, and then again in 2020.  He was seen in the wound clinic in November 2021 for an ulcer proximal from his current ulcer, and pressure injuries to his left posterior lower leg and left heel.  At that time, it was discovered that the patient had retained VAC foam embedded in the wound bed of the sacral wound.  Attempts were made to get him back to his plastic surgeon, though unsuccessful.  In January he underwent surgical removal of VAC sponge along with excisional debridement of his sacral wound by Dr. Chaves.  After the surgery, his wound went on to heal without incident.   In early April 2022, his home health nurse noted a new sacral ulcer, below the previous ulcer which quickly tripled in size over the following weeks.  The ulcer to his left medial lower leg had also deteriorated, with bone visible at the base..  He was hospitalized from 4/22 until 4/27/2022 and  underwent surgery with Dr. Raman on 4/26 for irrigation and debridement of multiple compartments of the left lower extremity, bone excision, and complex closure of chronic wound using biologic skin substitute.   His sacrococcygeal wound was not surgically addressed during this admission.  He was discharged back to his group home, with home health, and referral to outpatient wound clinic for his sacral wound.  He was instructed to follow-up with his surgeon for his lower leg wound.       Postoperatively, the left medial lower leg incision dehisced.  He was seen by his surgeon at [facility] on 5/11.  The surgeon opted to leave remaining sutures in place, and refer him to the wound clinic for treatment of this wound.   Treatment of this wound was initiated in clinic on 5/12.  During this visit was also noted that his heel DTI had resolved, but that he had a new pressure injury to his left posterior lower extremity.     A new pressure injury was noted to patient's right upper buttock/lower back on 5/20/2022.  Wound was linear in shape, skin discolored but intact.     Abrasion noted to left anterior lower leg.  First observed in clinic on 7/22/2022.  Patient states he bumped his leg into his food tray.     Small DTI noted to patient's left lateral lower leg on 7/29/2022.  Skin intact but discolored.     Large area of deep tissue injury noted to patient's left exterior lower leg.  Patient denied any trauma to this area.  Skin intact.  Wound documented.    1/27/2023: Patient was admitted to [facility] [place] [place] from 1/23-1/25/2023 with gross hematuria. He underwent RICHARD which showed watchman device was in place and he was taken off of Xarelto. While hospitalized wound team was consulted. He was referred back to [facility] and home health upon discharge.    Patient was hospitalized at [facility] for pyelonephritis from 2/26 until 3/2/2023, admitted for fever and general malaise.  He was admitted and initially started on linezolid and meropenem for suspected  UTI and history of multidrug-resistant organisms.  Urine cultures were negative. ID was consulted, recommended CT of chest and abdomen,which were negative for acute findings. However, he was treated with 5 days total course of antibiotics for suspected UTI, and symptoms completely resolved.  During this admission, the inpatient wound team was consulted for treatment of his sacral and lower leg wounds.  A wound culture was taken from his left heel pressure ulcer, negative.  Once stabilized, he was discharged home and referred back to [facility] to resume treatment of his wounds.    Patient was hospitalized at [facility] from 12/11 until 12/23/2023, admitted for fever.  Wound infection suspected.  CT scan of abdomen and pelvis for evaluation of sacral pressure injury showed gas tracking down to the bone consistent with osteomyelitis.  He underwent I&D of right ischial ulcer (documented as buttock) with Dr. Bansal, medial tract leading to an abscess was identified.  Cavity was opened allowing it to drain into the main wound bed.  Wound VAC was placed and managed by wound team during this admission.  A bone scan of patient's left foot was also done, initially concerning for osteomyelitis.  Orthopedic surgery was consulted and did not recommend surgical intervention. ID consulted also, recommended the patient to receive IV ertapenem 1 g every 24 hours plus IV daptomycin 8 mg/kg every 24 hours through 1/22/2024.   He was discharged to [facility] on 1/23 for IV antibiotics and wound care.  From the LTAC he was discharged home on 1/22 with home health and referral back to [facility] to resume management of his wounds  .      Pertinent Medical History: Incomplete quadriplegia, history of stage IV pressure injuries, history of flap procedures to sacral pressure injuries, osteomyelitis, obesity, colostomy in place   Contributing factors: Immobility and Obesity, impaired sensation    Personal support: Attendant-staff at correction and home health  nursing    TOBACCO USE:   Former smoker, quit in 1977.  Never used smokeless tobacco    Patient's problem list, allergies, and current medications reviewed and updated in Epic    Interval History:  Interval History thinned 7/29/2022.  Please see previous notes for complete interval history.   Interval History thinned 1/27/2023. Please see previous note for complete interval history.  Interval History thinned 3/3/2023.  Please see previous notes for complete interval history.    Interval History thinned 8/4/2023.  Please see previous notes for complete interval history.    Interval History thinned 1/31/2024.  Please see previous notes for complete interval history.    Interval History thinned 6/26/2024.  Please see previous notes for complete interval history.      6/19/2024: Clinic visit with Steve Hodges MD. Patient denies any fevers or chills. Reports he is tolerating Abx. He has appointment with ID later today to discuss OM treatment. He is tolerating wound VAC. Slight bone exposed bilaterally in ischial wounds, slightly worse.    6/26/2024 : Clinic visit with ADILSON Arthur, FNPEGGY-BC, CWDINESHN, CFTRACI.   Patient presents today with significant deterioration of his both ischial wounds, with increased depth of undermining.   He now has a PICC line, and will be starting outpatient infusions of ertapenem later today.  He states he is feeling well, denies fevers, chills, nausea, vomiting, cough or shortness of breath.  Due to deterioration of his wound, we did discuss possibly having him go over the hospital for surgical debridement and IV antibiotics.  He was hesitant to do so.  We agreed to see how he looks after a week or so IV antibiotics.  He is agreeable to minimizing time up in his wheelchair.  He also agrees to go to the emergency room immediately if he develops any signs or symptoms of infection.    7/10/2024: Clinic visit with Steve Hodges MD. Patient reports feeling in normal state of health. He missed  last appointment as he had fall out of wheelchair and was evaluated in ED. He denies any injuries from fall. Patient wounds are measuring slightly smaller. He continues on Ertapenem. Patient reports that he has appointment for seating evaluation from [facility] scheduled, but does not recall the date.    7/17/2024 : Clinic visit with ADILSON Arthur, HOLDEN, LILIYA VALERIO.   Anjum states he is feeling well.  Left ischial wound measures significantly smaller today, however measurements vary somewhat depending on pt's position.  Both wounds appear to be progressing slightly, with improving tissue quality.    7/24/2024 : Clinic visit with ADILSON Arthur, HOLDEN, IMAN, LILIYA.   Patient continues to feel well, offers no complaints.  Right ischial wound measures smaller, left ischial wound is larger.  Patient tolerating VAC without any difficulty.  Home health continues to see him in between clinic visits.  He is still receiving daily infusions of IV antibiotics, will be completing these in August.    7/31/2024 : Clinic visit with ADILSON Arthur, HOLDEN, IMAN, LILIYA.   Anjum continues to feel well, his wounds are slowly progressing.  Tolerating VAC.  He is on IV antibiotics for 1 more week.  Admits that he is up in his wheelchair for 10 to 14 hours/day.    8/7/2024 : Clinic visit with ADILSON Arthur FNP-BC, LILIYA VALERIO.   Anjum states he is feeling well today, offers no complaints.  Both wounds measure a bit smaller today, new granulation tissue noted.  He will be getting his last IV antibiotic infusion today.    8/14/2024 : Clinic visit with ADILSON Arthur FNP-BC, IMAN, LILIYA.   Patient continues to feel well.  He has completed his antibiotics, followed up with ID earlier this week, no further antibiotic therapy at this time.  Ischial wounds are slowly progressing.  Lower extremity wounds remain resolved.    8/21/2024 : Clinic visit with ADILSON Arthur FNP-BC, LILIYA VALERIO.   Anjum states he is feeling  well, offers no complaints.  His wounds are slowly progressing, increased granulation.    8/28/2024 : Clinic visit with ADILSON Arthur, HOLDEN, LILIYA VALERIO.   Turk states he is feeling well overall.  His wounds measure slightly smaller.  He has had some urinary symptoms, reports leakage from around his suprapubic catheter last night, and excessively full urine bag.  He has been in contact with his urologist office.  He also mentions that he has a recurring small scab to his nose, states that it falls off only to return shortly after.  This has been going on for several months.  I offered to refer him to dermatology.    9/11/2024 : Clinic visit with ADILSON Arthur, HOLDEN, LILIYA VALERIO.   Turk states he is feeling well.  He missed his appointment last week due to car troubles.  His vehicle is now running well.  Ischial wounds show some improvement, increased granulation tissue.  He does have a small reopening of left posterior lower leg wound, appears to be a small abrasion over area of scar tissue.    9/18/2024: Clinic visit with Steve Hodges MD. Patient reports doing ok. Denies any acute issues with wound VAC. Reports that he was fitted by [employer] for new seat cushion and is being manufactured, he is not sure when will be ready. Patient's wounds appears slightly improved. Left posterior leg wound remains open.    9/25/2024 : Clinic visit with ADILSON Arthur, HOLDEN, IMAN, LILIYA.   Patient states he is feeling well.  His wounds measure about the same.    10/2/2024: Clinic visit with HOLDEN Johnson CWON, LILIYA.  Pt denies fevers, chills, nausea, vomiting. Bilateral ischial ulcers increased in area.  Left IT quality overall worse compared to right IT.  Recommend Dakin's soak.  Continue with VAC to bilateral IT.    10/9/2024 : Clinic visit with ADILSON Arthur, HOLDEN, IMAN, LILIYA.   Patient continues to feel well overall.  His wounds measure about the same, no evidence of infection.   He does have some minor breakdown over the scar tissue of his sacrum which bears watching.  He has not heard from [employer] about his seat cushion, states he will contact them again.    10/16/2024 : Clinic visit with ADILSON Arthur, HOLDEN, IMAN, LILIYA.   Anjum continues to feel well.  His wounds measure slightly smaller.  Sacral wound just slightly open.  He called [employer] earlier this week, was told his new cushion is ready, and that they would deliver it next Monday.  He also states he is due for a new wheelchair, but has not yet become process.      10/23/2024 : Clinic visit with ADILSON Arthur, HOLDEN, IMAN, LILIYA.   Anjum's wounds present today with significantly more odor.  He states he is feeling fine, denies fevers, chills, nausea, vomiting, cough or shortness of breath.  Increased drainage noted.  Wounds measure about the same.  Culture collected in clinic today after debridement.  VAC placed on hold.  Wounds packed with Puracyn moistened silver Hydrofiber.   Patient reminded that he is to go to the emergency room if he notices any increased redness, swelling, drainage or odor, or if he develops fever, chills, nausea or vomiting.    He has a new cushion to his wheelchair.  States that he does not yet have a new wheelchair, will be picking on out the next few weeks.    10/30/2024 : Clinic visit with ADILSON Arthur, HOLDEN, IMAN, LILIYA.   Ajnum states he is feeling well.  Wound odor still noticeable.  Culture collected last visit was positive for light growth of Proteus.  Given continued odor, will go ahead and prescribe short course of Augmentin, no other p.o. options available based on sensitivities.  Continue with Dakin's wound cleansing.  Home health to restart VAC on Friday 11/6/2024: Clinic visit with Steve Hodges MD. Patient reports doing well, denies any acute issues. Tolerating augmentin well without side effects. Odor much improved today. Wounds are improving slowly. Continue wound  VAC.    11/14/2024: Clinic visit with Steve Hodges MD. Patient reports doing ok. He was frustrated coming into clinic, was having trouble exiting his van. No evidence of wound infection today    11/20/2024: Clinic visit with Steve Hodges MD. Patient reports feeling in normal state of health. His ischial wounds stable and slowly improving. He has reopened sacral wound however. Denies any signs or symptoms of infection.    11/27/2024: Clinic visit with Steve Hodges MD. Patient reports doing well, denies any acute issues. Sacral wound measuring smaller. Right ischial wound smaller. Left ischial wound larger with increased slough and necrotic tissue at base of wound. Patient has contacted [employer] technician to evaluate seat due to reopening of sacrum, he is pending call back.    12/4/2024: Clinic visit with Steve Hodges MD. Patient reports doing well, denies any signs or symptoms of infection. Denies any issues with wound VAC. Sacrum has resolved. Right ischium wound larger with necrotic tissue, left ischium stable. He denies any traumatic events or any changes to his routine that would have increased pressure on right ischium besides time spent in chair during thanksgiving with family.    12/11/2024: Clinic visit with Steve Hodges MD. Patient reports doing well, denies any acute issues. Wounds are stable. No further necrosis of right ischium. Patient denies any signs or symptoms of infection.    12/18/2024: Clinic visit with Steve Hodges MD. Patient reports doing ok. Reports was diagnosed with UTI by urology, picking up Abx later today, thinks that he was prescribed Augmentin. Right ischial wound measuring larger, dusky tissue concerning for increased pressure. He reports that he has been up in wheelchair more this week with friend in town and has not been using tilt feature of chair as frequently. He was encouraged to spend more time offloading.   Due to holiday's, and dressings only going to be  changed 2x weekly, it is medically necessary to continue wound VAC for now as it would be severely problematic for patient to be sitting with saturated dressings during this period.    1/8/2025: Clinic visit with Steve Hodges MD. Patient reports chest congestion with low grade fevers for last few days. Denies any issues with wounds. He has increased necrosis of left IT pressure injury, he denies any changes in activity and is using tilt feature in chair every 30 min. Patient reports that he has appointment with [facility] next Friday to re-evaluate custom seat.    1/15/2025: Clinic visit with Steve Hodges MD. Patient returns to clinic following hospitalization for mycoplasma pneumonia.  Patient reports that he is feeling better denies any fevers or chills currently.  Patient has upcoming seating eval by [employer].  Wound VAC has been held since last week, wound seem to be slightly improved with holding VAC. Will continue to hold this this week.    1/22/2025: Clinic visit with Steve Hodges MD. Patient reports doing well, denies any acute issues. He had seat cushion re-evaluation and the technician noted that he had no hot spots when he was back far enough in chair, but if he slides forward then he is putting pressure on ischial tuberosities which explains the concern for pressure noted last few weeks. Patient will keep wheelchair slightly tilted back. Wounds with increased granulation tissue. Discontinue wound VAC and he will return to .    1/29/2025: Clinic visit with Steve Hodges MD. Patient reports doing ok, denies any signs or symptoms of infection. Patient assures me that he is sitting further back in chair as recommended by PT/wheel chair technician. Wounds are slowly improving, he reports less drainage. Will trial use of Hydrofiber instead of Enluxtra.    2/5/2025: Clinic visit with Steve Hodges MD. Patient reports doing well. Wounds are not overly macerated after holding Enluxtra,  continue hydrofiber.    2/12/2025: Clinic visit with Steve Hodges MD. Patient reports doing ok. Reports that Monday night his catheter was obstructed and caused leak saturating dressings. His group home changed and dried him, but dressings remained saturated. If something like this should occur again, patient will contact home health for dressing change.    2/19/2025 : Clinic visit with ADILSON Arthur, HOLDEN, IMAN, LILIYA.   Chart presents today saturated in urine due to leakage from his suprapubic catheter.  He states this is a frequent event due to bladder spasms.  He has seen his urologist several times, recently started getting Botox injections, does not feel this has helped.  I suggest that he consider getting a urostomy, would divert from bladder altogether, and if well created, would result in better containment of his urine.  He states he has an appointment with his urologist next month and will discuss with him.   His wounds continue to progress, but both measure smaller.    2/26/2025: Clinic visit with Steve Hodges MD. Patient reports doing well, denies any acute issues. Still having occasional leaking from catheter. Patient wounds measure about the same, however tissue quality has improved.     3/5/2025: Clinic visit with Steve Hodges MD. Patient reports doing well, denies any acute issues. Patients wounds are measuring smaller.  Right ischium wound is hypergranular.    3/12/2025 : Clinic visit with ADILSON Arthur, HOLDEN, IMAN, LILIYA.   States he is feeling well overall.  He is not having as much leakage from his suprapubic catheter.  However, he has an appointment with his urologist tomorrow, and will be discussing possible urostomy for better management of his urine.     His wounds continue to progress, significant improvement since modification to wheelchair cushion.    REVIEW OF SYSTEMS:   Unchanged from previous wound clinic assessment on 3/5/2025, except as noted in interval  history above.      PHYSICAL EXAMINATION:   There were no vitals taken for this visit.  Physical Exam  Constitutional:       Appearance: He is obese.   Cardiovascular:      Rate and Rhythm: Normal rate.   Pulmonary:      Effort: Pulmonary effort is normal.   Abdominal:      Comments: Colostomy left lower quadrant   Genitourinary:     Comments: Suprapubic catheter to down drain   Skin:     Comments: Stage IV pressure injury to right ischium: Wound area has decreased.  Thin layer of slough to wound bed.  Moderate serosanguineous drainage, no odor.  No evidence of infection    Stage IV pressure injury of left ischium: Wound area has decreased.  Thin layer of slough to wound bed.  Moderate serosanguineous drainage, no odor.  No evidence of infection    Stage IV pressure injury sacrum: Remains resolved    All other wounds remain healed, high risk for recurrence     Neurological:      Mental Status: He is alert and oriented to person, place, and time.   Psychiatric:         Mood and Affect: Mood normal.         WOUND ASSESSMENT  Wound 12/12/23 Pressure Injury Ischium Right (Active)   Wound Image    03/12/25 1530   Site Assessment Pink;Yellow 03/12/25 1530   Periwound Assessment Scar tissue;Maceration 03/12/25 1530   Margins Unattached edges 03/12/25 1530   Closure Secondary intention 10/16/24 1700   Drainage Amount Large 03/12/25 1530   Drainage Description Serosanguineous 03/12/25 1530   Treatments Cleansed;Provider debridement 03/12/25 1530   Offloading/DME Other (comment) 03/12/25 1530   Wound Cleansing Hypochlorus Acid 03/12/25 1530   Periwound Protectant Skin Protectant Wipes to Periwound;Moisture Barrier 03/12/25 1530   Dressing Changed Changed 03/12/25 1530   Dressing Cleansing/Solutions Normal Saline 03/12/25 1530   Dressing Options Hydrofera Blue Classic;Hydrofiber;Offloading Dressing - Sacral 03/12/25 1530   Dressing Change/Treatment Frequency Monday, Wednesday, Friday, and As Needed 03/12/25 1530   Wound Team  Following Weekly 10/16/24 1700   WOUND NURSE ONLY - Pressure Injury Stage 4 03/12/25 1530   Non-staged Wound Description Not applicable 03/05/25 1430   Wound Length (cm) 3.8 cm 03/12/25 1530   Wound Width (cm) 1.4 cm 03/12/25 1530   Wound Depth (cm) 0.5 cm 03/12/25 1530   Wound Surface Area (cm^2) 5.32 cm^2 03/12/25 1530   Wound Volume (cm^3) 2.66 cm^3 03/12/25 1530   Post-Procedure Length (cm) 3.9 cm 03/12/25 1530   Post-Procedure Width (cm) 1.4 cm 03/12/25 1530   Post-Procedure Depth (cm) 0.5 cm 03/12/25 1530   Post-Procedure Surface Area (cm^2) 5.46 cm^2 03/12/25 1530   Post-Procedure Volume (cm^3) 2.73 cm^3 03/12/25 1530   Wound Healing % 95 03/12/25 1530   Tunneling (cm) 0.7 cm 03/12/25 1530   Tunneling Clock Position of Wound 7 03/12/25 1530   Undermining (cm) 0 cm 03/12/25 1530   Undermining of Wound, 1st Location From 5 o'clock;To 7 o'clock 03/05/25 1430   Undermining (cm) - 2nd location 0.5 cm 02/19/25 1500   Undermining of Wound, 2nd Location From 7 o'clock;From 12 o'clock;To 2 o'clock 02/19/25 1500   Wound Odor None 03/12/25 1530   Exposed Structures None 03/12/25 1530   Number of days: 456       Wound 05/01/24 Pressure Injury Ischium Left (Active)   Wound Image    03/12/25 1530   Site Assessment Red;[place] 03/12/25 1530   Periwound Assessment Scar tissue;Maceration 03/12/25 1530   Margins Unattached edges 03/12/25 1530   Drainage Amount Large 03/12/25 1530   Drainage Description Serosanguineous 03/12/25 1530   Treatments Cleansed;Provider debridement 03/12/25 1530   Offloading/DME Other (comment) 03/12/25 1530   Wound Cleansing Hypochlorus Acid 03/12/25 1530   Periwound Protectant Skin Protectant Wipes to Periwound;Moisture Barrier 03/12/25 1530   Dressing Changed Changed 03/12/25 1530   Dressing Cleansing/Solutions Normal Saline 03/12/25 1530   Dressing Options Hydrofera Blue Classic;Hydrofiber;Offloading Dressing - Sacral 03/12/25 1530   Dressing Change/Treatment Frequency Monday, Wednesday, Friday, and  As Needed 03/12/25 1530   Wound Team Following Weekly 12/18/24 1700   WOUND NURSE ONLY - Pressure Injury Stage 4 03/12/25 1530   Non-staged Wound Description Not applicable 03/05/25 1430   Wound Length (cm) 4.5 cm 03/12/25 1530   Wound Width (cm) 1 cm 03/12/25 1530   Wound Depth (cm) 0.7 cm 03/12/25 1530   Wound Surface Area (cm^2) 4.5 cm^2 03/12/25 1530   Wound Volume (cm^3) 3.15 cm^3 03/12/25 1530   Post-Procedure Length (cm) 4.5 cm 03/12/25 1530   Post-Procedure Width (cm) 1.1 cm 03/12/25 1530   Post-Procedure Depth (cm) 0.7 cm 03/12/25 1530   Post-Procedure Surface Area (cm^2) 4.95 cm^2 03/12/25 1530   Post-Procedure Volume (cm^3) 3.465 cm^3 03/12/25 1530   Wound Healing % -1332 03/12/25 1530   Tunneling (cm) 0 cm 03/12/25 1530   Undermining (cm) 0 cm 03/12/25 1530   Undermining of Wound, 1st Location From 4 o'clock;To 10 o'clock 12/18/24 1700   Wound Odor None 03/12/25 1530   Exposed Structures None 03/12/25 1530   Number of days: 315       Wound 01/09/25 Other (comment) Coccyx Left;Right (Active)   Number of days: 62       Wound 01/09/25 Pressure Injury Sacrum (Active)   Number of days: 62       Wound 01/09/25 Other (comment) Toe, 3rd;Toe, 4th Left (Active)   Number of days: 62       PROCEDURE: Excisional debridement of right and left ischial wounds  -2% viscous lidocaine applied topically to wound bed for approximately 5 minutes prior to debridement  -Curette used to debride wound bed.  Excisional debridement was performed to remove devitalized tissue until healthy, bleeding tissue was visualized.  Total area debrided was approximately 10.41 cm², including into undermined areas of wounds.  Tissue debrided into the muscle / fascia layer.    -Bleeding controlled with manual pressure.  - Hypergranulation tissue was chemically cauterized   -Wound care completed by wound RN, refer to flowsheet  -Patient tolerated the procedure well, without c/o pain or discomfort.    Pertinent Labs and Diagnostics:    Labs:  "    A1c: No results found for: \"HBA1C\"     [facility] results, 7/1/2022 (under media tab)    CRP 13    ESR 31      IMAGING:     X-ray left tib-fib ordered 2/16/2024 through [facility]    12/11/2023-CT of abdomen pelvis with contrast  IMPRESSION:   1.  Right ischial decubitus ulcer extending to bone with soft tissue gas tracking along the right perineum. Appearance suggesting osteomyelitis, consider component of necrotizing fasciitis as clinically appropriate.  2.  Small pericardial effusion  3.  Left adrenal nodule, density on prior noncontrast CT demonstrates adenoma.  4.  Hepatomegaly  5.  Enlarged prostate, workup and evaluation for causes of prostate enlargement recommended as clinically appropriate.  6.  Atherosclerosis and atherosclerotic coronary artery disease    12/15/2023-bone scan of left foot  IMPRESSION:     1.  Mild increased activity in the LEFT 1st and 3rd toes on blood pool and delayed images possibly indicating inflammation/infection.  2.  No significant blood flow asymmetry.          VASCULAR STUDIES: No results found.    LAST  WOUND CULTURE:   Lab Results   Component Value Date/Time    CULTRSULT  02/21/2025 03:00 PM     Mixed enteric ade ,000 cfu/mL  3 or more organisms isolated, culture of doubtful  significance, please recollect.        PATHOLOGY  2/17/2023-bone fragment extracted from left lower extremity wound\\  FINAL DIAGNOSIS:     A. Left leg bone fragment at base of chronic wound:          Extensively degenerated fibrocartilaginous tissue with a rim of           fibrinopurulent debris          Correlate with culture findings            Comment: While no residual intact bone is identified, these           findings are suggestive of adjacent osteomyelitis.      ASSESSMENT AND PLAN:     1. Sacral decubitus ulcer, stage IV ([facility])  Comments: Ulcer first noted in early April 2022 as small open area which quickly enlarged.  This ulcer is present distal from previous sacral ulcer " which healed after surgery in January.  Patient has history of flap reconstruction x2 to this area.    3/12/2025: Wound remains resolved, high risk for recurrence  - Continue to protect area with pressure relieving sacral foam dressing.  -Patient does spend a lot of time up in his wheelchair, and wishes to continue to do so for his quality of life.  He lives independently, drives, and is involved with family activities.    -His has a new cushion in his wheelchair.  Has yet to pick out a new wheelchair  - Known OM that was previously treated. CT scan done during recent hospitalization did not show OM of sacrum, however OM of the ischium noted.  -Patient is very well versed in pressure relief strategies    Wound care: silicone adhesive foam dressing    2. Pressure injury of right ischium, stage 4 ([facility])  Comments: Abscess and OM found on CT during hospitalization in December 2023.  Patient underwent I&D with VAC placement.  IV antibiotics through 1/22/2024.    3/12/2025: Wound area has decreased.  Thin layer of slough to wound bed  - Excisional debridement of nonviable tissue from wound in clinic today, medically necessary to promote wound healing  - VAC discontinued previously.  -Patient to return to clinic weekly for assessment, debridement, and dressing change.    -Home health to change dressing 2 times per week in between clinic visits.    -Patient is very well versed on pressure relief measures, has adequate surface on bed, alternating low air loss.    Wound care: Hydrofera Blue Classic, Hydrofiber, Silicone foam    3. Pressure injury of left ischium, stage 4 ([facility])    3/12/2025: Wound area has decreased.  Thin layer of slough to wound bed  - Excisional debridement of wound in clinic today, medically necessary to promote wound healing.  - Patient to return to clinic weekly for assessment, debridement, and dressing change  -VAC has been discontinued  -Home health to change dressing 2 times per week in between clinic  visits.    -Patient has new cushion in his wheelchair, see above  -Patient is very well versed on pressure relief measures, has adequate surface on bed, alternating low air loss.    Wound care: Hydrofera Blue Classic, Hydrofiber, Silicone foam    4. Other acute osteomyelitis, other site ([facility])    3/12/2025: Bone fragments removed during clinic visit in June were sent for pathology, polymicrobial, most concerning was an MDR ESBL Proteus.   -Patient completed IV antibiotics.  No further antibiotics at this time per ID  -Patient understands he has a very low threshold for infection/sepsis.  He understands he is to go to the emergency room immediately if he begins to experience fevers, chills, nausea or vomiting, or if he notices radiating erythema or purulent drainage from his wounds.    5. Quadriplegia, C5-C7 incomplete ([facility])  Comments: Complicating factor.  Impaired mobility and sensation  -Patient is still spending 7-8 hours/day up in his wheelchair and knows to reposition frequently.  Wears heel float boots bilaterally at all times  - Patient has new custom wheelchair seat. He had refitting and appears he was not sitting back in chair enough which was causing undue pressure to IT. He is more aware of the correct positioning in chair.    6.  Leakage from urinary catheter, subsequent encounter    3/12/2025: Patient reports some improvement, has been undergoing Botox treatments  -Not as much leakage over the past week  -Recently started Botox injections, feels they may be helping little bit  -He is meeting with his urologist tomorrow to discuss possible ileal conduit for better management of his urine      Please note that this note may have been created using voice recognition software. I have worked with technical experts from [employer] to optimize the interface.  I have made every reasonable attempt to correct obvious errors, but there may be errors of grammar and possibly content that I did not discover before  finalizing the note.

## 2025-03-19 ENCOUNTER — OFFICE VISIT (OUTPATIENT)
Dept: WOUND CARE | Facility: MEDICAL CENTER | Age: 75
End: 2025-03-19
Payer: MEDICARE

## 2025-03-19 DIAGNOSIS — L89.314 PRESSURE INJURY OF RIGHT ISCHIUM, STAGE 4 (HCC): Primary | ICD-10-CM

## 2025-03-19 DIAGNOSIS — M86.18 OTHER ACUTE OSTEOMYELITIS, OTHER SITE (HCC): ICD-10-CM

## 2025-03-19 DIAGNOSIS — T83.038D: ICD-10-CM

## 2025-03-19 DIAGNOSIS — L89.324 PRESSURE INJURY OF LEFT ISCHIUM, STAGE 4 (HCC): ICD-10-CM

## 2025-03-19 DIAGNOSIS — G82.54 QUADRIPLEGIA, C5-C7, INCOMPLETE (HCC): ICD-10-CM

## 2025-03-19 DIAGNOSIS — L89.154 SACRAL DECUBITUS ULCER, STAGE IV (HCC): ICD-10-CM

## 2025-03-19 PROCEDURE — 11043 DBRDMT MUSC&/FSCA 1ST 20/<: CPT | Performed by: STUDENT IN AN ORGANIZED HEALTH CARE EDUCATION/TRAINING PROGRAM

## 2025-03-19 PROCEDURE — 11043 DBRDMT MUSC&/FSCA 1ST 20/<: CPT

## 2025-03-19 PROCEDURE — 11042 DBRDMT SUBQ TIS 1ST 20SQCM/<: CPT

## 2025-03-19 NOTE — PROGRESS NOTES
Home health wound care orders routed to [facility] via [facility].

## 2025-03-19 NOTE — PROGRESS NOTES
Provider Encounter- Pressure Injury        HISTORY OF PRESENT ILLNESS  Wound History:    START OF CARE IN CLINIC: 1/31/2024 (return to clinic after hospitalization)    REFERRING PROVIDER: Racquel York       WOUNDS-superior coccyx pressure injury, stage IV-resolved                                 Coccyx pressure injury, stage IV-resolved           Inferior coccyx pressure injury, stage IV resolved                                 Right ischial pressure injury, stage IV                                 Left heel pressure injury, recurring stage III-resolved                                 Left posterior lower leg/calf-stage III-resolved                                 Left medial lower leg surgical wound dehiscence-resolved, reopens frequently-resolved            Left ischial pressure injury, stage IV-first observed in clinic 5/1/2024          HISTORY: Patient with history of incomplete quadriplegia referred to [facility] for treatment of a stage IV pressure injury.  He has a history of previous pressure injuries to this area, and underwent muscle flaps in 2019, and then again in 2020.  He was seen in the wound clinic in November 2021 for an ulcer proximal from his current ulcer, and pressure injuries to his left posterior lower leg and left heel.  At that time, it was discovered that the patient had retained VAC foam embedded in the wound bed of the sacral wound.  Attempts were made to get him back to his plastic surgeon, though unsuccessful.  In January he underwent surgical removal of VAC sponge along with excisional debridement of his sacral wound by Dr. Chaves.  After the surgery, his wound went on to heal without incident.   In early April 2022, his home health nurse noted a new sacral ulcer, below the previous ulcer which quickly tripled in size over the following weeks.  The ulcer to his left medial lower leg had also deteriorated, with bone visible at the base..  He was hospitalized from 4/22 until 4/27/2022 and  underwent surgery with Dr. Raman on 4/26 for irrigation and debridement of multiple compartments of the left lower extremity, bone excision, and complex closure of chronic wound using biologic skin substitute.   His sacrococcygeal wound was not surgically addressed during this admission.  He was discharged back to his group home, with home health, and referral to outpatient wound clinic for his sacral wound.  He was instructed to follow-up with his surgeon for his lower leg wound.       Postoperatively, the left medial lower leg incision dehisced.  He was seen by his surgeon at [facility] on 5/11.  The surgeon opted to leave remaining sutures in place, and refer him to the wound clinic for treatment of this wound.   Treatment of this wound was initiated in clinic on 5/12.  During this visit was also noted that his heel DTI had resolved, but that he had a new pressure injury to his left posterior lower extremity.     A new pressure injury was noted to patient's right upper buttock/lower back on 5/20/2022.  Wound was linear in shape, skin discolored but intact.     Abrasion noted to left anterior lower leg.  First observed in clinic on 7/22/2022.  Patient states he bumped his leg into his food tray.     Small DTI noted to patient's left lateral lower leg on 7/29/2022.  Skin intact but discolored.     Large area of deep tissue injury noted to patient's left exterior lower leg.  Patient denied any trauma to this area.  Skin intact.  Wound documented.    1/27/2023: Patient was admitted to [facility] from 1/23-1/25/2023 with gross hematuria. He underwent RICHARD which showed watchman device was in place and he was taken off of Xarelto. While hospitalized wound team was consulted. He was referred back to [facility] and home health upon discharge.    Patient was hospitalized at [facility] for pyelonephritis from 2/26 until 3/2/2023, admitted for fever and general malaise.  He was admitted and initially started on linezolid and meropenem for suspected  UTI and history of multidrug-resistant organisms.  Urine cultures were negative. ID was consulted, recommended CT of chest and abdomen,which were negative for acute findings. However, he was treated with 5 days total course of antibiotics for suspected UTI, and symptoms completely resolved.  During this admission, the inpatient wound team was consulted for treatment of his sacral and lower leg wounds.  A wound culture was taken from his left heel pressure ulcer, negative.  Once stabilized, he was discharged home and referred back to [facility] to resume treatment of his wounds.    Patient was hospitalized at [facility] from 12/11 until 12/23/2023, admitted for fever.  Wound infection suspected.  CT scan of abdomen and pelvis for evaluation of sacral pressure injury showed gas tracking down to the bone consistent with osteomyelitis.  He underwent I&D of right ischial ulcer (documented as buttock) with Dr. Bansal, medial tract leading to an abscess was identified.  Cavity was opened allowing it to drain into the main wound bed.  Wound VAC was placed and managed by wound team during this admission.  A bone scan of patient's left foot was also done, initially concerning for osteomyelitis.  Orthopedic surgery was consulted and did not recommend surgical intervention. ID consulted also, recommended the patient to receive IV ertapenem 1 g every 24 hours plus IV daptomycin 8 mg/kg every 24 hours through 1/22/2024.   He was discharged to [facility] on 1/23 for IV antibiotics and wound care.  From the LTAC he was discharged home on 1/22 with home health and referral back to [facility] to resume management of his wounds  .      Pertinent Medical History: Incomplete quadriplegia, history of stage IV pressure injuries, history of flap procedures to sacral pressure injuries, osteomyelitis, obesity, colostomy in place   Contributing factors: Immobility and Obesity, impaired sensation    Personal support: Attendant-staff at [facility] and home health  nursing    TOBACCO USE:   Former smoker, quit in 1977.  Never used smokeless tobacco    Patient's problem list, allergies, and current medications reviewed and updated in Epic    Interval History:  Interval History thinned 7/29/2022.  Please see previous notes for complete interval history.   Interval History thinned 1/27/2023. Please see previous note for complete interval history.  Interval History thinned 3/3/2023.  Please see previous notes for complete interval history.    Interval History thinned 8/4/2023.  Please see previous notes for complete interval history.    Interval History thinned 1/31/2024.  Please see previous notes for complete interval history.    Interval History thinned 6/26/2024.  Please see previous notes for complete interval history.      6/19/2024: Clinic visit with Steve Hodges MD. Patient denies any fevers or chills. Reports he is tolerating Abx. He has appointment with ID later today to discuss OM treatment. He is tolerating wound VAC. Slight bone exposed bilaterally in ischial wounds, slightly worse.    6/26/2024 : Clinic visit with ADILSON Arthur, FNPEGGY-BC, CWDINESHN, CFTRACI.   Patient presents today with significant deterioration of his both ischial wounds, with increased depth of undermining.   He now has a PICC line, and will be starting outpatient infusions of ertapenem later today.  He states he is feeling well, denies fevers, chills, nausea, vomiting, cough or shortness of breath.  Due to deterioration of his wound, we did discuss possibly having him go over the hospital for surgical debridement and IV antibiotics.  He was hesitant to do so.  We agreed to see how he looks after a week or so IV antibiotics.  He is agreeable to minimizing time up in his wheelchair.  He also agrees to go to the emergency room immediately if he develops any signs or symptoms of infection.    7/10/2024: Clinic visit with Steve Hodges MD. Patient reports feeling in normal state of health. He missed  last appointment as he had fall out of wheelchair and was evaluated in ED. He denies any injuries from fall. Patient wounds are measuring slightly smaller. He continues on Ertapenem. Patient reports that he has appointment for seating evaluation from [facility] scheduled, but does not recall the date.    7/17/2024 : Clinic visit with ADILSON Arthur, HOLDEN, LILIYA VALERIO.   Anjum states he is feeling well.  Left ischial wound measures significantly smaller today, however measurements vary somewhat depending on pt's position.  Both wounds appear to be progressing slightly, with improving tissue quality.    7/24/2024 : Clinic visit with ADILSON Arthur, HOLDEN, IMAN, LILIYA.   Patient continues to feel well, offers no complaints.  Right ischial wound measures smaller, left ischial wound is larger.  Patient tolerating VAC without any difficulty.  Home health continues to see him in between clinic visits.  He is still receiving daily infusions of IV antibiotics, will be completing these in August.    7/31/2024 : Clinic visit with ADILSON Arthur, HOLDEN, IMAN, LILIYA.   Anjum continues to feel well, his wounds are slowly progressing.  Tolerating VAC.  He is on IV antibiotics for 1 more week.  Admits that he is up in his wheelchair for 10 to 14 hours/day.    8/7/2024 : Clinic visit with ADILSON Arthur FNP-BC, LILIYA VALERIO.   Anjum states he is feeling well today, offers no complaints.  Both wounds measure a bit smaller today, new granulation tissue noted.  He will be getting his last IV antibiotic infusion today.    8/14/2024 : Clinic visit with ADILSON Arthur FNP-BC, IMAN, LILIYA.   Patient continues to feel well.  He has completed his antibiotics, followed up with ID earlier this week, no further antibiotic therapy at this time.  Ischial wounds are slowly progressing.  Lower extremity wounds remain resolved.    8/21/2024 : Clinic visit with ADILSON Arthur FNP-BC, LILIYA VALERIO.   Anjum states he is feeling  well, offers no complaints.  His wounds are slowly progressing, increased granulation.    8/28/2024 : Clinic visit with ADILSON Arthur, HOLDEN, LILIYA VALERIO.   Turk states he is feeling well overall.  His wounds measure slightly smaller.  He has had some urinary symptoms, reports leakage from around his suprapubic catheter last night, and excessively full urine bag.  He has been in contact with his urologist office.  He also mentions that he has a recurring small scab to his nose, states that it falls off only to return shortly after.  This has been going on for several months.  I offered to refer him to dermatology.    9/11/2024 : Clinic visit with ADILSON Arthur, HOLDEN, LILIYA VALERIO.   Turk states he is feeling well.  He missed his appointment last week due to car troubles.  His vehicle is now running well.  Ischial wounds show some improvement, increased granulation tissue.  He does have a small reopening of left posterior lower leg wound, appears to be a small abrasion over area of scar tissue.    9/18/2024: Clinic visit with Steve Hodges MD. Patient reports doing ok. Denies any acute issues with wound VAC. Reports that he was fitted by [employer] for new seat cushion and is being manufactured, he is not sure when will be ready. Patient's wounds appears slightly improved. Left posterior leg wound remains open.    9/25/2024 : Clinic visit with ADILSON Arthur, HOLDEN, IMAN, LILIYA.   Patient states he is feeling well.  His wounds measure about the same.    10/2/2024: Clinic visit with HOLDEN Johnson CWON, LILIYA.  Pt denies fevers, chills, nausea, vomiting. Bilateral ischial ulcers increased in area.  Left IT quality overall worse compared to right IT.  Recommend Dakin's soak.  Continue with VAC to bilateral IT.    10/9/2024 : Clinic visit with ADILSON Arthur, HOLDEN, IMAN, LILIYA.   Patient continues to feel well overall.  His wounds measure about the same, no evidence of infection.   He does have some minor breakdown over the scar tissue of his sacrum which bears watching.  He has not heard from [employer] about his seat cushion, states he will contact them again.    10/16/2024 : Clinic visit with ADILSON Arthur, HOLDEN, IMAN, LILIYA.   Anjum continues to feel well.  His wounds measure slightly smaller.  Sacral wound just slightly open.  He called [employer] earlier this week, was told his new cushion is ready, and that they would deliver it next Monday.  He also states he is due for a new wheelchair, but has not yet become process.      10/23/2024 : Clinic visit with ADILSON Arthur, HOLDEN, IMAN, LILIYA.   Anjum's wounds present today with significantly more odor.  He states he is feeling fine, denies fevers, chills, nausea, vomiting, cough or shortness of breath.  Increased drainage noted.  Wounds measure about the same.  Culture collected in clinic today after debridement.  VAC placed on hold.  Wounds packed with Puracyn moistened silver Hydrofiber.   Patient reminded that he is to go to the emergency room if he notices any increased redness, swelling, drainage or odor, or if he develops fever, chills, nausea or vomiting.    He has a new cushion to his wheelchair.  States that he does not yet have a new wheelchair, will be picking on out the next few weeks.    10/30/2024 : Clinic visit with ADILSON Arthur, HOLDEN, IMAN, LILIYA.   Anjum states he is feeling well.  Wound odor still noticeable.  Culture collected last visit was positive for light growth of Proteus.  Given continued odor, will go ahead and prescribe short course of Augmentin, no other p.o. options available based on sensitivities.  Continue with Dakin's wound cleansing.  Home health to restart VAC on Friday 11/6/2024: Clinic visit with Steve Hodges MD. Patient reports doing well, denies any acute issues. Tolerating augmentin well without side effects. Odor much improved today. Wounds are improving slowly. Continue wound  VAC.    11/14/2024: Clinic visit with Steve Hodges MD. Patient reports doing ok. He was frustrated coming into clinic, was having trouble exiting his van. No evidence of wound infection today    11/20/2024: Clinic visit with Steve Hodges MD. Patient reports feeling in normal state of health. His ischial wounds stable and slowly improving. He has reopened sacral wound however. Denies any signs or symptoms of infection.    11/27/2024: Clinic visit with Steve Hodges MD. Patient reports doing well, denies any acute issues. Sacral wound measuring smaller. Right ischial wound smaller. Left ischial wound larger with increased slough and necrotic tissue at base of wound. Patient has contacted [employer] technician to evaluate seat due to reopening of sacrum, he is pending call back.    12/4/2024: Clinic visit with Steve Hodges MD. Patient reports doing well, denies any signs or symptoms of infection. Denies any issues with wound VAC. Sacrum has resolved. Right ischium wound larger with necrotic tissue, left ischium stable. He denies any traumatic events or any changes to his routine that would have increased pressure on right ischium besides time spent in chair during thanksgiving with family.    12/11/2024: Clinic visit with Steve Hodges MD. Patient reports doing well, denies any acute issues. Wounds are stable. No further necrosis of right ischium. Patient denies any signs or symptoms of infection.    12/18/2024: Clinic visit with Steve Hodges MD. Patient reports doing ok. Reports was diagnosed with UTI by urology, picking up Abx later today, thinks that he was prescribed Augmentin. Right ischial wound measuring larger, dusky tissue concerning for increased pressure. He reports that he has been up in wheelchair more this week with friend in town and has not been using tilt feature of chair as frequently. He was encouraged to spend more time offloading.   Due to holiday's, and dressings only going to be  changed 2x weekly, it is medically necessary to continue wound VAC for now as it would be severely problematic for patient to be sitting with saturated dressings during this period.    1/8/2025: Clinic visit with Steve Hodges MD. Patient reports chest congestion with low grade fevers for last few days. Denies any issues with wounds. He has increased necrosis of left IT pressure injury, he denies any changes in activity and is using tilt feature in chair every 30 min. Patient reports that he has appointment with [facility] next Friday to re-evaluate custom seat.    1/15/2025: Clinic visit with Steve Hodges MD. Patient returns to clinic following hospitalization for mycoplasma pneumonia.  Patient reports that he is feeling better denies any fevers or chills currently.  Patient has upcoming seating eval by [employer].  Wound VAC has been held since last week, wound seem to be slightly improved with holding VAC. Will continue to hold this this week.    1/22/2025: Clinic visit with Steve Hodges MD. Patient reports doing well, denies any acute issues. He had seat cushion re-evaluation and the technician noted that he had no hot spots when he was back far enough in chair, but if he slides forward then he is putting pressure on ischial tuberosities which explains the concern for pressure noted last few weeks. Patient will keep wheelchair slightly tilted back. Wounds with increased granulation tissue. Discontinue wound VAC and he will return to .    1/29/2025: Clinic visit with Steve Hodges MD. Patient reports doing ok, denies any signs or symptoms of infection. Patient assures me that he is sitting further back in chair as recommended by PT/wheel chair technician. Wounds are slowly improving, he reports less drainage. Will trial use of Hydrofiber instead of Enluxtra.    2/5/2025: Clinic visit with Steve Hodges MD. Patient reports doing well. Wounds are not overly macerated after holding Enluxtra,  continue hydrofiber.    2/12/2025: Clinic visit with Steve Hodges MD. Patient reports doing ok. Reports that Monday night his catheter was obstructed and caused leak saturating dressings. His group home changed and dried him, but dressings remained saturated. If something like this should occur again, patient will contact home health for dressing change.    2/19/2025 : Clinic visit with ADILSON Arthur, HOLDEN, IMAN, LILIYA.   Chart presents today saturated in urine due to leakage from his suprapubic catheter.  He states this is a frequent event due to bladder spasms.  He has seen his urologist several times, recently started getting Botox injections, does not feel this has helped.  I suggest that he consider getting a urostomy, would divert from bladder altogether, and if well created, would result in better containment of his urine.  He states he has an appointment with his urologist next month and will discuss with him.   His wounds continue to progress, but both measure smaller.    2/26/2025: Clinic visit with Steve Hodges MD. Patient reports doing well, denies any acute issues. Still having occasional leaking from catheter. Patient wounds measure about the same, however tissue quality has improved.     3/5/2025: Clinic visit with Steve Hodges MD. Patient reports doing well, denies any acute issues. Patients wounds are measuring smaller.  Right ischium wound is hypergranular.    3/12/2025 : Clinic visit with ADILSON Arthur, HOLDEN, IMAN, LILIYA.   States he is feeling well overall.  He is not having as much leakage from his suprapubic catheter.  However, he has an appointment with his urologist tomorrow, and will be discussing possible urostomy for better management of his urine.     His wounds continue to progress, significant improvement since modification to wheelchair cushion.    3/19/2025: Clinic visit with Steve Hodges MD. Patient reports doing well. He botox injections in bladder to  decrease leakage form catheter. He discussed urostomy with urologist, but they are going to try additional treatments before considering. Patient wounds continue to make slow improvement.    REVIEW OF SYSTEMS:   Unchanged from previous wound clinic assessment on 3/12/2025, except as noted in interval history above.      PHYSICAL EXAMINATION:   There were no vitals taken for this visit.  Physical Exam  Constitutional:       Appearance: He is obese.   Cardiovascular:      Rate and Rhythm: Normal rate.   Pulmonary:      Effort: Pulmonary effort is normal.   Abdominal:      Comments: Colostomy left lower quadrant   Genitourinary:     Comments: Suprapubic catheter to down drain   Skin:     Comments: Stage IV pressure injury to right ischium: Wound slowly improving.  Thin layer of slough to wound bed.  Moderate serosanguineous drainage, no odor.  No evidence of infection    Stage IV pressure injury of left ischium: Wound slowly improving.  Thin layer of slough to wound bed.  Moderate serosanguineous drainage, no odor.  No evidence of infection    Stage IV pressure injury sacrum: Remains resolved    All other wounds remain healed, high risk for recurrence     Neurological:      Mental Status: He is alert and oriented to person, place, and time.   Psychiatric:         Mood and Affect: Mood normal.         WOUND ASSESSMENT  Wound 12/12/23 Pressure Injury Ischium Right (Active)   Wound Image    03/19/25 1544   Site Assessment Pink;Red;Yellow 03/19/25 1544   Periwound Assessment Scar tissue;Maceration 03/19/25 1544   Margins Unattached edges 03/19/25 1544   Drainage Amount Large 03/19/25 1544   Drainage Description Serosanguineous 03/19/25 1544   Treatments Cleansed;Provider debridement;Site care 03/19/25 1544   Offloading/DME Other (comment) 03/19/25 1544   Wound Cleansing Hypochlorus Acid 03/19/25 1544   Periwound Protectant Skin Protectant Wipes to Periwound;Moisture Barrier 03/19/25 1544   Dressing Changed Changed 03/12/25  1530   Dressing Cleansing/Solutions Normal Saline 03/19/25 1544   Dressing Options Hydrofera Blue Classic;Hydrofiber;Offloading Dressing - Sacral 03/19/25 1544   Dressing Change/Treatment Frequency Monday, Wednesday, Friday, and As Needed 03/19/25 1544   WOUND NURSE ONLY - Pressure Injury Stage 4 03/19/25 1544   Non-staged Wound Description Not applicable 03/05/25 1430   Wound Length (cm) 3.5 cm 03/19/25 1544   Wound Width (cm) 1.1 cm 03/19/25 1544   Wound Depth (cm) 0.3 cm 03/19/25 1544   Wound Surface Area (cm^2) 3.85 cm^2 03/19/25 1544   Wound Volume (cm^3) 1.155 cm^3 03/19/25 1544   Post-Procedure Length (cm) 3.5 cm 03/19/25 1544   Post-Procedure Width (cm) 1.2 cm 03/19/25 1544   Post-Procedure Depth (cm) 0.3 cm 03/19/25 1544   Post-Procedure Surface Area (cm^2) 4.2 cm^2 03/19/25 1544   Post-Procedure Volume (cm^3) 1.26 cm^3 03/19/25 1544   Wound Healing % 98 03/19/25 1544   Tunneling (cm) 2.2 cm 03/19/25 1544   Tunneling Clock Position of Wound 6 03/19/25 1544   Undermining (cm) 0 cm 03/19/25 1544   Undermining of Wound, 1st Location From 5 o'clock;To 7 o'clock 03/05/25 1430   Undermining (cm) - 2nd location 0.5 cm 02/19/25 1500   Undermining of Wound, 2nd Location From 7 o'clock;From 12 o'clock;To 2 o'clock 02/19/25 1500   Wound Odor None 03/19/25 1544   Exposed Structures None 03/19/25 1544   Number of days: 463       Wound 05/01/24 Pressure Injury Ischium Left (Active)   Wound Image    03/19/25 1544   Site Assessment Pink;Red 03/19/25 1544   Periwound Assessment Scar tissue;Maceration 03/19/25 1544   Margins Unattached edges 03/19/25 1544   Drainage Amount Large 03/19/25 1544   Drainage Description Serosanguineous 03/19/25 1544   Treatments Cleansed;Provider debridement;Site care 03/19/25 1544   Offloading/DME Other (comment) 03/19/25 1544   Wound Cleansing Hypochlorus Acid 03/19/25 1544   Periwound Protectant Skin Protectant Wipes to Periwound;Moisture Barrier 03/19/25 1544   Dressing Changed Changed  03/12/25 1530   Dressing Cleansing/Solutions Normal Saline 03/19/25 1544   Dressing Options Hydrofera Blue Classic;Hydrofiber;Offloading Dressing - Sacral 03/19/25 1544   Dressing Change/Treatment Frequency Monday, Wednesday, Friday, and As Needed 03/19/25 1544   Wound Team Following Weekly 12/18/24 1700   WOUND NURSE ONLY - Pressure Injury Stage 4 03/19/25 1544   Non-staged Wound Description Not applicable 03/05/25 1430   Wound Length (cm) 4.5 cm 03/19/25 1544   Wound Width (cm) 1.3 cm 03/19/25 1544   Wound Depth (cm) 1 cm 03/19/25 1544   Wound Surface Area (cm^2) 5.85 cm^2 03/19/25 1544   Wound Volume (cm^3) 5.85 cm^3 03/19/25 1544   Post-Procedure Length (cm) 4.6 cm 03/19/25 1544   Post-Procedure Width (cm) 1.3 cm 03/19/25 1544   Post-Procedure Depth (cm) 1 cm 03/19/25 1544   Post-Procedure Surface Area (cm^2) 5.98 cm^2 03/19/25 1544   Post-Procedure Volume (cm^3) 5.98 cm^3 03/19/25 1544   Wound Healing % -2559 03/19/25 1544   Tunneling (cm) 0 cm 03/19/25 1544   Undermining (cm) 0 cm 03/19/25 1544   Undermining of Wound, 1st Location From 4 o'clock;To 10 o'clock 12/18/24 1700   Wound Odor None 03/19/25 1544   Exposed Structures None 03/19/25 1544   Number of days: 322       Wound 01/09/25 Other (comment) Coccyx Left;Right (Active)   Number of days: 69       Wound 01/09/25 Pressure Injury Sacrum (Active)   Number of days: 69       Wound 01/09/25 Other (comment) Toe, 3rd;Toe, 4th Left (Active)   Number of days: 69       PROCEDURE: Excisional debridement of right and left ischial wounds  -2% viscous lidocaine applied topically to wound bed for approximately 5 minutes prior to debridement  -Curette used to debride wound bed.  Excisional debridement was performed to remove devitalized tissue until healthy, bleeding tissue was visualized.  Total area debrided was approximately 10.18 cm², including into undermined areas of wounds.  Tissue debrided into the muscle / fascia layer.    -Bleeding controlled with manual  "pressure.  - Hypergranulation tissue was chemically cauterized   -Wound care completed by wound RN, refer to flowsheet  -Patient tolerated the procedure well, without c/o pain or discomfort.    Pertinent Labs and Diagnostics:    Labs:     A1c: No results found for: \"HBA1C\"     [facility] results, 7/1/2022 (under media tab)    CRP 13    ESR 31      IMAGING:     X-ray left tib-fib ordered 2/16/2024 through [facility]    12/11/2023-CT of abdomen pelvis with contrast  IMPRESSION:   1.  Right ischial decubitus ulcer extending to bone with soft tissue gas tracking along the right perineum. Appearance suggesting osteomyelitis, consider component of necrotizing fasciitis as clinically appropriate.  2.  Small pericardial effusion  3.  Left adrenal nodule, density on prior noncontrast CT demonstrates adenoma.  4.  Hepatomegaly  5.  Enlarged prostate, workup and evaluation for causes of prostate enlargement recommended as clinically appropriate.  6.  Atherosclerosis and atherosclerotic coronary artery disease    12/15/2023-bone scan of left foot  IMPRESSION:     1.  Mild increased activity in the LEFT 1st and 3rd toes on blood pool and delayed images possibly indicating inflammation/infection.  2.  No significant blood flow asymmetry.          VASCULAR STUDIES: No results found.    LAST  WOUND CULTURE:   Lab Results   Component Value Date/Time    CULTRSULT  02/21/2025 03:00 PM     Mixed enteric ade ,000 cfu/mL  3 or more organisms isolated, culture of doubtful  significance, please recollect.        PATHOLOGY  2/17/2023-bone fragment extracted from left lower extremity wound\\  FINAL DIAGNOSIS:     A. Left leg bone fragment at base of chronic wound:          Extensively degenerated fibrocartilaginous tissue with a rim of           fibrinopurulent debris          Correlate with culture findings            Comment: While no residual intact bone is identified, these           findings are suggestive of adjacent " osteomyelitis.      ASSESSMENT AND PLAN:     1. Sacral decubitus ulcer, stage IV (HCC)  Comments: Ulcer first noted in early April 2022 as small open area which quickly enlarged.  This ulcer is present distal from previous sacral ulcer which healed after surgery in January.  Patient has history of flap reconstruction x2 to this area.    3/19/2025: Wound remains resolved, high risk for recurrence  - Continue to protect area with pressure relieving sacral foam dressing.  -Patient does spend a lot of time up in his wheelchair, and wishes to continue to do so for his quality of life.  He lives independently, drives, and is involved with family activities.    -His has a new cushion in his wheelchair.  Has yet to pick out a new wheelchair  - Known OM that was previously treated. CT scan done during recent hospitalization did not show OM of sacrum, however OM of the ischium noted.  -Patient is very well versed in pressure relief strategies    Wound care: silicone adhesive foam dressing    2. Pressure injury of right ischium, stage 4 (Formerly [facility])  Comments: Abscess and OM found on CT during hospitalization in December 2023.  Patient underwent I&D with VAC placement.  IV antibiotics through 1/22/2024.    3/19/2025: Wound slowly improving  - Excisional debridement of nonviable tissue from wound in clinic today, medically necessary to promote wound healing  - VAC discontinued previously.  -Patient to return to clinic weekly for assessment, debridement, and dressing change.    -Home health to change dressing 2 times per week in between clinic visits.    -Patient is very well versed on pressure relief measures, has adequate surface on bed, alternating low air loss.    Wound care: Hydrofera Blue Classic, Hydrofiber, Silicone foam    3. Pressure injury of left ischium, stage 4 (HCC)    3/19/2025: Wound slowly improving  - Excisional debridement of wound in clinic today, medically necessary to promote wound healing.  - Patient to return  to clinic weekly for assessment, debridement, and dressing change  -VAC has been discontinued  -Home health to change dressing 2 times per week in between clinic visits.    -Patient has new cushion in his wheelchair, see above  -Patient is very well versed on pressure relief measures, has adequate surface on bed, alternating low air loss.    Wound care: Hydrofera Blue Classic, Hydrofiber, Silicone foam    4. Other acute osteomyelitis, other site (Formerly [facility])    3/19/2025: Bone fragments removed during clinic visit in June were sent for pathology, polymicrobial, most concerning was an MDR ESBL Proteus.   -Patient completed IV antibiotics.  No further antibiotics at this time per ID  -Patient understands he has a very low threshold for infection/sepsis.  He understands he is to go to the emergency room immediately if he begins to experience fevers, chills, nausea or vomiting, or if he notices radiating erythema or purulent drainage from his wounds.    5. Quadriplegia, C5-C7 incomplete (Formerly [facility])  Comments: Complicating factor.  Impaired mobility and sensation  -Patient is still spending 7-8 hours/day up in his wheelchair and knows to reposition frequently.  Wears heel float boots bilaterally at all times  - Patient has new custom wheelchair seat. He had refitting and appears he was not sitting back in chair enough which was causing undue pressure to IT. He is more aware of the correct positioning in chair.    6.  Leakage from urinary catheter, subsequent encounter    3/19/2025: Patient reports some improvement, has been undergoing Botox treatments  -Not as much leakage over the past week  -Recently started Botox injections, feels they may be helping little bit  -He discussed ileal conduit with urologist. They will be pursuing other therapies before considering.      Please note that this note may have been created using voice recognition software. I have worked with technical experts from [employer] to optimize the interface.   I have made every reasonable attempt to correct obvious errors, but there may be errors of grammar and possibly content that I did not discover before finalizing the note.

## 2025-03-19 NOTE — PATIENT INSTRUCTIONS
-Keep dressings clean and dry. Change dressings every 2-3 days, and if they become over saturated, soiled or fall off.     -On the days you are not changing your dressings, avoid getting the dressings wet. It is not harmful to get your wound wet when you are washing your wound before applying a new dressing.    -If you need to change your dressings at home: Wash your wound with normal saline, wound cleanser, or unscented soap and water prior to applying your new dressings. Please avoid cleansing with hydrogen peroxide or rubbing alcohol. Hydrogen peroxide and rubbing alcohol are toxic to new tissue and skin cells.    -Avoid prolonged standing or sitting without elevating your legs.    -Should you experience any significant changes in your wound(s), such as signs of infection (increasing redness, swelling, localized heat, increased pain, fever > 101 F, chills) or have any questions regarding your home care instructions, please contact the wound center at [phone]. If after hours, contact your primary care physician or go to the hospital emergency room.     -If you are admitted to any hospital, you will need a new referral to come back to the wound clinic and any scheduled appointments that you already have, may be cancelled.     -If you are 5 or more minutes late for an appointment, we reserve the right to cancel and reschedule that appointment. Additionally, if you are habitually late or not showing (3 late cancellations and/or no shows), we reserve the right to cancel your remaining appointments and it will be your responsibility to obtain a new referral if services are still needed.

## 2025-03-26 ENCOUNTER — OFFICE VISIT (OUTPATIENT)
Dept: WOUND CARE | Facility: MEDICAL CENTER | Age: 75
End: 2025-03-26
Payer: MEDICARE

## 2025-03-26 DIAGNOSIS — T83.038D: ICD-10-CM

## 2025-03-26 DIAGNOSIS — L89.324 PRESSURE INJURY OF LEFT ISCHIUM, STAGE 4 (HCC): ICD-10-CM

## 2025-03-26 DIAGNOSIS — G82.54 QUADRIPLEGIA, C5-C7, INCOMPLETE (HCC): ICD-10-CM

## 2025-03-26 DIAGNOSIS — M86.18 OTHER ACUTE OSTEOMYELITIS, OTHER SITE (HCC): ICD-10-CM

## 2025-03-26 DIAGNOSIS — L89.314 PRESSURE INJURY OF RIGHT ISCHIUM, STAGE 4 (HCC): ICD-10-CM

## 2025-03-26 DIAGNOSIS — L89.154 SACRAL DECUBITUS ULCER, STAGE IV (HCC): ICD-10-CM

## 2025-03-26 PROCEDURE — 11042 DBRDMT SUBQ TIS 1ST 20SQCM/<: CPT

## 2025-03-26 PROCEDURE — 11043 DBRDMT MUSC&/FSCA 1ST 20/<: CPT | Performed by: NURSE PRACTITIONER

## 2025-03-26 PROCEDURE — 11043 DBRDMT MUSC&/FSCA 1ST 20/<: CPT

## 2025-03-26 NOTE — PROGRESS NOTES
Updated wound care orders updated and sent via [facility] to [facility].

## 2025-03-26 NOTE — PATIENT INSTRUCTIONS
-Keep your wound dressing clean, dry, and intact. Only change dressing if it's over saturated, soiled or falls off.     -Change your dressing if it becomes soiled, soaked, or falls off.      -Should you experience any significant changes in your wound(s), such as infection (redness, swelling, localized heat, increased pain, fever > 101 F, chills) or have any questions regarding your home care instructions, please contact the wound center at [phone]. If after hours, contact your primary care physician or go to the hospital emergency room.     If you are admitted to any hospital, you will need a new referral to come back to the wound clinic and any scheduled appointments that you already have, may be cancelled.

## 2025-03-26 NOTE — PROGRESS NOTES
Provider Encounter- Pressure Injury        HISTORY OF PRESENT ILLNESS  Wound History:    START OF CARE IN CLINIC: 1/31/2024 (return to clinic after hospitalization)    REFERRING PROVIDER: Racquel York       WOUNDS-superior coccyx pressure injury, stage IV-resolved                                 Coccyx pressure injury, stage IV-resolved           Inferior coccyx pressure injury, stage IV resolved                                 Right ischial pressure injury, stage IV                                 Left heel pressure injury, recurring stage III-resolved                                 Left posterior lower leg/calf-stage III-resolved                                 Left medial lower leg surgical wound dehiscence-resolved, reopens frequently-resolved            Left ischial pressure injury, stage IV-first observed in clinic 5/1/2024          HISTORY: Patient with history of incomplete quadriplegia referred to [facility] for treatment of a stage IV pressure injury.  He has a history of previous pressure injuries to this area, and underwent muscle flaps in 2019, and then again in 2020.  He was seen in the wound clinic in November 2021 for an ulcer proximal from his current ulcer, and pressure injuries to his left posterior lower leg and left heel.  At that time, it was discovered that the patient had retained VAC foam embedded in the wound bed of the sacral wound.  Attempts were made to get him back to his plastic surgeon, though unsuccessful.  In January he underwent surgical removal of VAC sponge along with excisional debridement of his sacral wound by Dr. Chaves.  After the surgery, his wound went on to heal without incident.   In early April 2022, his home health nurse noted a new sacral ulcer, below the previous ulcer which quickly tripled in size over the following weeks.  The ulcer to his left medial lower leg had also deteriorated, with bone visible at the base..  He was hospitalized from 4/22 until 4/27/2022 and  underwent surgery with Dr. Raman on 4/26 for irrigation and debridement of multiple compartments of the left lower extremity, bone excision, and complex closure of chronic wound using biologic skin substitute.   His sacrococcygeal wound was not surgically addressed during this admission.  He was discharged back to his group home, with home health, and referral to outpatient wound clinic for his sacral wound.  He was instructed to follow-up with his surgeon for his lower leg wound.       Postoperatively, the left medial lower leg incision dehisced.  He was seen by his surgeon at [facility] on 5/11.  The surgeon opted to leave remaining sutures in place, and refer him to the wound clinic for treatment of this wound.   Treatment of this wound was initiated in clinic on 5/12.  During this visit was also noted that his heel DTI had resolved, but that he had a new pressure injury to his left posterior lower extremity.     A new pressure injury was noted to patient's right upper buttock/lower back on 5/20/2022.  Wound was linear in shape, skin discolored but intact.     Abrasion noted to left anterior lower leg.  First observed in clinic on 7/22/2022.  Patient states he bumped his leg into his food tray.     Small DTI noted to patient's left lateral lower leg on 7/29/2022.  Skin intact but discolored.     Large area of deep tissue injury noted to patient's left exterior lower leg.  Patient denied any trauma to this area.  Skin intact.  Wound documented.    1/27/2023: Patient was admitted to [facility] from 1/23-1/25/2023 with gross hematuria. He underwent RICHARD which showed watchman device was in place and he was taken off of Xarelto. While hospitalized wound team was consulted. He was referred back to [facility] and home health upon discharge.    Patient was hospitalized at [facility] for pyelonephritis from 2/26 until 3/2/2023, admitted for fever and general malaise.  He was admitted and initially started on linezolid and meropenem for suspected  UTI and history of multidrug-resistant organisms.  Urine cultures were negative. ID was consulted, recommended CT of chest and abdomen,which were negative for acute findings. However, he was treated with 5 days total course of antibiotics for suspected UTI, and symptoms completely resolved.  During this admission, the inpatient wound team was consulted for treatment of his sacral and lower leg wounds.  A wound culture was taken from his left heel pressure ulcer, negative.  Once stabilized, he was discharged home and referred back to [facility] to resume treatment of his wounds.    Patient was hospitalized at [facility] from 12/11 until 12/23/2023, admitted for fever.  Wound infection suspected.  CT scan of abdomen and pelvis for evaluation of sacral pressure injury showed gas tracking down to the bone consistent with osteomyelitis.  He underwent I&D of right ischial ulcer (documented as buttock) with Dr. Bansal, medial tract leading to an abscess was identified.  Cavity was opened allowing it to drain into the main wound bed.  Wound VAC was placed and managed by wound team during this admission.  A bone scan of patient's left foot was also done, initially concerning for osteomyelitis.  Orthopedic surgery was consulted and did not recommend surgical intervention. ID consulted also, recommended the patient to receive IV ertapenem 1 g every 24 hours plus IV daptomycin 8 mg/kg every 24 hours through 1/22/2024.   He was discharged to [facility] on 1/23 for IV antibiotics and wound care.  From the LTAC he was discharged home on 1/22 with home health and referral back to [facility] to resume management of his wounds  .      Pertinent Medical History: Incomplete quadriplegia, history of stage IV pressure injuries, history of flap procedures to sacral pressure injuries, osteomyelitis, obesity, colostomy in place   Contributing factors: Immobility and Obesity, impaired sensation    Personal support: Attendant-staff at nursing home and home health  nursing    TOBACCO USE:   Former smoker, quit in 1977.  Never used smokeless tobacco    Patient's problem list, allergies, and current medications reviewed and updated in Epic    Interval History:  Interval History thinned 7/29/2022.  Please see previous notes for complete interval history.   Interval History thinned 1/27/2023. Please see previous note for complete interval history.  Interval History thinned 3/3/2023.  Please see previous notes for complete interval history.    Interval History thinned 8/4/2023.  Please see previous notes for complete interval history.    Interval History thinned 1/31/2024.  Please see previous notes for complete interval history.    Interval History thinned 6/26/2024.  Please see previous notes for complete interval history.      6/19/2024: Clinic visit with Steve Hodges MD. Patient denies any fevers or chills. Reports he is tolerating Abx. He has appointment with ID later today to discuss OM treatment. He is tolerating wound VAC. Slight bone exposed bilaterally in ischial wounds, slightly worse.    6/26/2024 : Clinic visit with ADILSON Arthur, FNPEGGY-BC, CWDINESHN, CFTRACI.   Patient presents today with significant deterioration of his both ischial wounds, with increased depth of undermining.   He now has a PICC line, and will be starting outpatient infusions of ertapenem later today.  He states he is feeling well, denies fevers, chills, nausea, vomiting, cough or shortness of breath.  Due to deterioration of his wound, we did discuss possibly having him go over the hospital for surgical debridement and IV antibiotics.  He was hesitant to do so.  We agreed to see how he looks after a week or so IV antibiotics.  He is agreeable to minimizing time up in his wheelchair.  He also agrees to go to the emergency room immediately if he develops any signs or symptoms of infection.    7/10/2024: Clinic visit with Steve Hodges MD. Patient reports feeling in normal state of health. He missed  last appointment as he had fall out of wheelchair and was evaluated in ED. He denies any injuries from fall. Patient wounds are measuring slightly smaller. He continues on Ertapenem. Patient reports that he has appointment for seating evaluation from [facility] scheduled, but does not recall the date.    7/17/2024 : Clinic visit with ADILSON Arthur, HOLDEN, LILIYA VALERIO.   Anjum states he is feeling well.  Left ischial wound measures significantly smaller today, however measurements vary somewhat depending on pt's position.  Both wounds appear to be progressing slightly, with improving tissue quality.    7/24/2024 : Clinic visit with ADILSON Arthur, HOLDEN, IMAN, LILIYA.   Patient continues to feel well, offers no complaints.  Right ischial wound measures smaller, left ischial wound is larger.  Patient tolerating VAC without any difficulty.  Home health continues to see him in between clinic visits.  He is still receiving daily infusions of IV antibiotics, will be completing these in August.    7/31/2024 : Clinic visit with ADILSON Arthur, HOLDEN, IMAN, LILIYA.   Anjum continues to feel well, his wounds are slowly progressing.  Tolerating VAC.  He is on IV antibiotics for 1 more week.  Admits that he is up in his wheelchair for 10 to 14 hours/day.    8/7/2024 : Clinic visit with ADILSON Arthur FNP-BC, LILIYA VALERIO.   Anjum states he is feeling well today, offers no complaints.  Both wounds measure a bit smaller today, new granulation tissue noted.  He will be getting his last IV antibiotic infusion today.    8/14/2024 : Clinic visit with ADILSON Arthur FNP-BC, IMAN, LILIYA.   Patient continues to feel well.  He has completed his antibiotics, followed up with ID earlier this week, no further antibiotic therapy at this time.  Ischial wounds are slowly progressing.  Lower extremity wounds remain resolved.    8/21/2024 : Clinic visit with ADILSON Arthur FNP-BC, LILIYA VALERIO.   Anjum states he is feeling  well, offers no complaints.  His wounds are slowly progressing, increased granulation.    8/28/2024 : Clinic visit with ADILSON Arthur, HOLDEN, LILIYA VALERIO.   Turk states he is feeling well overall.  His wounds measure slightly smaller.  He has had some urinary symptoms, reports leakage from around his suprapubic catheter last night, and excessively full urine bag.  He has been in contact with his urologist office.  He also mentions that he has a recurring small scab to his nose, states that it falls off only to return shortly after.  This has been going on for several months.  I offered to refer him to dermatology.    9/11/2024 : Clinic visit with ADILSON Arthur, HOLDEN, LILIYA VALERIO.   Turk states he is feeling well.  He missed his appointment last week due to car troubles.  His vehicle is now running well.  Ischial wounds show some improvement, increased granulation tissue.  He does have a small reopening of left posterior lower leg wound, appears to be a small abrasion over area of scar tissue.    9/18/2024: Clinic visit with Steve Hodges MD. Patient reports doing ok. Denies any acute issues with wound VAC. Reports that he was fitted by [employer] for new seat cushion and is being manufactured, he is not sure when will be ready. Patient's wounds appears slightly improved. Left posterior leg wound remains open.    9/25/2024 : Clinic visit with ADILSON Arthur, HOLDEN, IMAN, LILIYA.   Patient states he is feeling well.  His wounds measure about the same.    10/2/2024: Clinic visit with HOLDEN Johnson CWON, LILIYA.  Pt denies fevers, chills, nausea, vomiting. Bilateral ischial ulcers increased in area.  Left IT quality overall worse compared to right IT.  Recommend Dakin's soak.  Continue with VAC to bilateral IT.    10/9/2024 : Clinic visit with ADILSON Arthur, HOLDEN, IMAN, LILIYA.   Patient continues to feel well overall.  His wounds measure about the same, no evidence of infection.   He does have some minor breakdown over the scar tissue of his sacrum which bears watching.  He has not heard from [employer] about his seat cushion, states he will contact them again.    10/16/2024 : Clinic visit with ADILSON Arthur, HOLDEN, IMAN, LILIYA.   Anjum continues to feel well.  His wounds measure slightly smaller.  Sacral wound just slightly open.  He called [employer] earlier this week, was told his new cushion is ready, and that they would deliver it next Monday.  He also states he is due for a new wheelchair, but has not yet become process.      10/23/2024 : Clinic visit with ADILSON Arthur, HOLDEN, IMAN, LILIYA.   Anjum's wounds present today with significantly more odor.  He states he is feeling fine, denies fevers, chills, nausea, vomiting, cough or shortness of breath.  Increased drainage noted.  Wounds measure about the same.  Culture collected in clinic today after debridement.  VAC placed on hold.  Wounds packed with Puracyn moistened silver Hydrofiber.   Patient reminded that he is to go to the emergency room if he notices any increased redness, swelling, drainage or odor, or if he develops fever, chills, nausea or vomiting.    He has a new cushion to his wheelchair.  States that he does not yet have a new wheelchair, will be picking on out the next few weeks.    10/30/2024 : Clinic visit with ADILSON Arthur, HOLDEN, IMAN, LILIYA.   Anjum states he is feeling well.  Wound odor still noticeable.  Culture collected last visit was positive for light growth of Proteus.  Given continued odor, will go ahead and prescribe short course of Augmentin, no other p.o. options available based on sensitivities.  Continue with Dakin's wound cleansing.  Home health to restart VAC on Friday 11/6/2024: Clinic visit with Steve Hodges MD. Patient reports doing well, denies any acute issues. Tolerating augmentin well without side effects. Odor much improved today. Wounds are improving slowly. Continue wound  VAC.    11/14/2024: Clinic visit with Steve Hodges MD. Patient reports doing ok. He was frustrated coming into clinic, was having trouble exiting his van. No evidence of wound infection today    11/20/2024: Clinic visit with Steve Hodges MD. Patient reports feeling in normal state of health. His ischial wounds stable and slowly improving. He has reopened sacral wound however. Denies any signs or symptoms of infection.    11/27/2024: Clinic visit with Steve Hodges MD. Patient reports doing well, denies any acute issues. Sacral wound measuring smaller. Right ischial wound smaller. Left ischial wound larger with increased slough and necrotic tissue at base of wound. Patient has contacted [employer] technician to evaluate seat due to reopening of sacrum, he is pending call back.    12/4/2024: Clinic visit with Steve Hodges MD. Patient reports doing well, denies any signs or symptoms of infection. Denies any issues with wound VAC. Sacrum has resolved. Right ischium wound larger with necrotic tissue, left ischium stable. He denies any traumatic events or any changes to his routine that would have increased pressure on right ischium besides time spent in chair during thanksgiving with family.    12/11/2024: Clinic visit with Steve Hodges MD. Patient reports doing well, denies any acute issues. Wounds are stable. No further necrosis of right ischium. Patient denies any signs or symptoms of infection.    12/18/2024: Clinic visit with Steve Hodges MD. Patient reports doing ok. Reports was diagnosed with UTI by urology, picking up Abx later today, thinks that he was prescribed Augmentin. Right ischial wound measuring larger, dusky tissue concerning for increased pressure. He reports that he has been up in wheelchair more this week with friend in town and has not been using tilt feature of chair as frequently. He was encouraged to spend more time offloading.   Due to holiday's, and dressings only going to be  changed 2x weekly, it is medically necessary to continue wound VAC for now as it would be severely problematic for patient to be sitting with saturated dressings during this period.    1/8/2025: Clinic visit with Steve Hodges MD. Patient reports chest congestion with low grade fevers for last few days. Denies any issues with wounds. He has increased necrosis of left IT pressure injury, he denies any changes in activity and is using tilt feature in chair every 30 min. Patient reports that he has appointment with [facility] next Friday to re-evaluate custom seat.    1/15/2025: Clinic visit with Steve Hodges MD. Patient returns to clinic following hospitalization for mycoplasma pneumonia.  Patient reports that he is feeling better denies any fevers or chills currently.  Patient has upcoming seating eval by [employer].  Wound VAC has been held since last week, wound seem to be slightly improved with holding VAC. Will continue to hold this this week.    1/22/2025: Clinic visit with Steve Hodges MD. Patient reports doing well, denies any acute issues. He had seat cushion re-evaluation and the technician noted that he had no hot spots when he was back far enough in chair, but if he slides forward then he is putting pressure on ischial tuberosities which explains the concern for pressure noted last few weeks. Patient will keep wheelchair slightly tilted back. Wounds with increased granulation tissue. Discontinue wound VAC and he will return to .    1/29/2025: Clinic visit with Steve Hodges MD. Patient reports doing ok, denies any signs or symptoms of infection. Patient assures me that he is sitting further back in chair as recommended by PT/wheel chair technician. Wounds are slowly improving, he reports less drainage. Will trial use of Hydrofiber instead of Enluxtra.    2/5/2025: Clinic visit with Steve Hodges MD. Patient reports doing well. Wounds are not overly macerated after holding Enluxtra,  continue hydrofiber.    2/12/2025: Clinic visit with Steev Hodges MD. Patient reports doing ok. Reports that Monday night his catheter was obstructed and caused leak saturating dressings. His group home changed and dried him, but dressings remained saturated. If something like this should occur again, patient will contact home health for dressing change.    2/19/2025 : Clinic visit with ADILSON Arthur, HOLDEN, IMAN, LILIYA.   Chart presents today saturated in urine due to leakage from his suprapubic catheter.  He states this is a frequent event due to bladder spasms.  He has seen his urologist several times, recently started getting Botox injections, does not feel this has helped.  I suggest that he consider getting a urostomy, would divert from bladder altogether, and if well created, would result in better containment of his urine.  He states he has an appointment with his urologist next month and will discuss with him.   His wounds continue to progress, but both measure smaller.    2/26/2025: Clinic visit with Steve Hodges MD. Patient reports doing well, denies any acute issues. Still having occasional leaking from catheter. Patient wounds measure about the same, however tissue quality has improved.     3/5/2025: Clinic visit with Steve Hodges MD. Patient reports doing well, denies any acute issues. Patients wounds are measuring smaller.  Right ischium wound is hypergranular.    3/12/2025 : Clinic visit with ADILSON Arthur, HOLDEN, IMAN, LILIYA.   States he is feeling well overall.  He is not having as much leakage from his suprapubic catheter.  However, he has an appointment with his urologist tomorrow, and will be discussing possible urostomy for better management of his urine.     His wounds continue to progress, significant improvement since modification to wheelchair cushion.    3/19/2025: Clinic visit with Steve Hodges MD. Patient reports doing well. He botox injections in bladder to  decrease leakage form catheter. He discussed urostomy with urologist, but they are going to try additional treatments before considering. Patient wounds continue to make slow improvement.    3/26/2025 : Clinic visit with ADILSON Arthur, HOLDEN, ALEXN, CFTRACI.   Anjum continues to feel well.  He has been having much less drainage from his suprapubic catheter, receiving Botox injections every 6 months.  His ischial wounds are steadily progressing.  Right ischial wound measures slightly larger due to excision of skin flap from distal wound edge.    REVIEW OF SYSTEMS:   Unchanged from previous wound clinic assessment on 3/19/2025, except as noted in interval history above.      PHYSICAL EXAMINATION:   There were no vitals taken for this visit.  Physical Exam  Constitutional:       Appearance: He is obese.   Cardiovascular:      Rate and Rhythm: Normal rate.   Pulmonary:      Effort: Pulmonary effort is normal.   Abdominal:      Comments: Colostomy left lower quadrant   Genitourinary:     Comments: Suprapubic catheter to down drain   Skin:     Comments: Stage IV pressure injury to right ischium: Wound measures slightly larger after excision of skin flap from distal wound edge.  Thin layer of slough to wound bed.  Moderate serosanguineous drainage, no odor.  No periwound erythema or induration    Stage IV pressure injury of left ischium: Measures smaller.  Thin layer of slough to wound bed.  Moderate serosanguineous drainage without odor.  No evidence of soft tissue infection    Stage IV pressure injury sacrum: Remains resolved    All other wounds remain healed, high risk for recurrence     Neurological:      Mental Status: He is alert and oriented to person, place, and time.   Psychiatric:         Mood and Affect: Mood normal.         WOUND ASSESSMENT  Wound 12/12/23 Pressure Injury Ischium Right (Active)   Wound Image    03/26/25 1500   Site Assessment Pink;Red;Yellow 03/26/25 1500   Periwound Assessment  Maceration;Scar tissue 03/26/25 1500   Margins Unattached edges 03/26/25 1500   Drainage Amount Large 03/26/25 1500   Drainage Description Serosanguineous 03/26/25 1500   Treatments Cleansed;Provider debridement 03/26/25 1500   Offloading/DME Other (comment) 03/26/25 1500   Wound Cleansing Foam Cleanser/Washcloth 03/26/25 1500   Periwound Protectant Skin Protectant Wipes to Periwound;Moisture Barrier 03/26/25 1500   Dressing Changed Changed 03/12/25 1530   Dressing Cleansing/Solutions Normal Saline 03/26/25 1500   Dressing Options Hydrofera Blue Classic;Hydrofiber;Offloading Dressing - Sacral 03/26/25 1500   Dressing Change/Treatment Frequency Monday, Wednesday, Friday, and As Needed 03/26/25 1500   WOUND NURSE ONLY - Pressure Injury Stage 4 03/26/25 1500   Non-staged Wound Description Not applicable 03/05/25 1430   Wound Length (cm) 3.2 cm 03/26/25 1500   Wound Width (cm) 2.1 cm 03/26/25 1500   Wound Depth (cm) 0.2 cm 03/26/25 1500   Wound Surface Area (cm^2) 6.72 cm^2 03/26/25 1500   Wound Volume (cm^3) 1.344 cm^3 03/26/25 1500   Post-Procedure Length (cm) 3.2 cm 03/26/25 1500   Post-Procedure Width (cm) 2.1 cm 03/26/25 1500   Post-Procedure Depth (cm) 0.2 cm 03/26/25 1500   Post-Procedure Surface Area (cm^2) 6.72 cm^2 03/26/25 1500   Post-Procedure Volume (cm^3) 1.344 cm^3 03/26/25 1500   Wound Healing % 98 03/26/25 1500   Tunneling (cm) 1.6 cm 03/26/25 1500   Tunneling Clock Position of Wound 6 03/26/25 1500   Undermining (cm) 0 cm 03/26/25 1500   Undermining of Wound, 1st Location From 5 o'clock;To 7 o'clock 03/05/25 1430   Undermining (cm) - 2nd location 0.5 cm 02/19/25 1500   Undermining of Wound, 2nd Location From 7 o'clock;From 12 o'clock;To 2 o'clock 02/19/25 1500   Wound Odor Mild 03/26/25 1500   Exposed Structures None 03/26/25 1500   Number of days: 470       Wound 05/01/24 Pressure Injury Ischium Left (Active)   Wound Image    03/26/25 1500   Site Assessment Pink;Red 03/26/25 1500   Periwound  Assessment Maceration;Scar tissue 03/26/25 1500   Margins Unattached edges 03/26/25 1500   Drainage Amount Large 03/26/25 1500   Drainage Description Serosanguineous 03/26/25 1500   Treatments Cleansed;Provider debridement 03/26/25 1500   Offloading/DME Other (comment) 03/26/25 1500   Wound Cleansing Foam Cleanser/Washcloth 03/26/25 1500   Periwound Protectant Skin Protectant Wipes to Periwound;Moisture Barrier 03/26/25 1500   Dressing Changed Changed 03/12/25 1530   Dressing Cleansing/Solutions Normal Saline 03/26/25 1500   Dressing Options Hydrofera Blue Classic;Hydrofiber;Offloading Dressing - Sacral 03/26/25 1500   Dressing Change/Treatment Frequency Monday, Wednesday, Friday, and As Needed 03/26/25 1500   Wound Team Following Weekly 12/18/24 1700   WOUND NURSE ONLY - Pressure Injury Stage 4 03/26/25 1500   Non-staged Wound Description Not applicable 03/05/25 1430   Wound Length (cm) 4.2 cm 03/26/25 1500   Wound Width (cm) 1.2 cm 03/26/25 1500   Wound Depth (cm) 1.6 cm 03/26/25 1500   Wound Surface Area (cm^2) 5.04 cm^2 03/26/25 1500   Wound Volume (cm^3) 8.064 cm^3 03/26/25 1500   Post-Procedure Length (cm) 4.3 cm 03/26/25 1500   Post-Procedure Width (cm) 1.2 cm 03/26/25 1500   Post-Procedure Depth (cm) 1.6 cm 03/26/25 1500   Post-Procedure Surface Area (cm^2) 5.16 cm^2 03/26/25 1500   Post-Procedure Volume (cm^3) 8.256 cm^3 03/26/25 1500   Wound Healing % -3565 03/26/25 1500   Tunneling (cm) 0 cm 03/26/25 1500   Undermining (cm) 0 cm 03/26/25 1500   Undermining of Wound, 1st Location From 4 o'clock;To 10 o'clock 12/18/24 1700   Wound Odor None 03/26/25 1500   Exposed Structures None 03/26/25 1500   Number of days: 329       Wound 01/09/25 Other (comment) Coccyx Left;Right (Active)   Number of days: 76       Wound 01/09/25 Pressure Injury Sacrum (Active)   Number of days: 76       Wound 01/09/25 Other (comment) Toe, 3rd;Toe, 4th Left (Active)   Number of days: 76       PROCEDURE: Excisional debridement of right  "and left ischial wounds  -2% viscous lidocaine applied topically to wound bed for approximately 5 minutes prior to debridement  -Scissors and forceps used to excise small flap of skin over distal edge of right ischial wound.  -Curette then used to debride both wound beds.  Excisional debridement was performed to remove devitalized tissue until healthy, bleeding tissue was visualized.  Total area debrided was 11.88 cm², including into undermined areas of wounds.  Tissue debrided into the muscle / fascia layer.    -Bleeding controlled with manual pressure.  -Wound care completed by wound RN, refer to flowsheet  -Patient tolerated the procedure well, without c/o pain or discomfort.    Pertinent Labs and Diagnostics:    Labs:     A1c: No results found for: \"HBA1C\"     [facility] results, 7/1/2022 (under media tab)    CRP 13    ESR 31      IMAGING:     X-ray left tib-fib ordered 2/16/2024 through [facility]    12/11/2023-CT of abdomen pelvis with contrast  IMPRESSION:   1.  Right ischial decubitus ulcer extending to bone with soft tissue gas tracking along the right perineum. Appearance suggesting osteomyelitis, consider component of necrotizing fasciitis as clinically appropriate.  2.  Small pericardial effusion  3.  Left adrenal nodule, density on prior noncontrast CT demonstrates adenoma.  4.  Hepatomegaly  5.  Enlarged prostate, workup and evaluation for causes of prostate enlargement recommended as clinically appropriate.  6.  Atherosclerosis and atherosclerotic coronary artery disease    12/15/2023-bone scan of left foot  IMPRESSION:     1.  Mild increased activity in the LEFT 1st and 3rd toes on blood pool and delayed images possibly indicating inflammation/infection.  2.  No significant blood flow asymmetry.          VASCULAR STUDIES: No results found.    LAST  WOUND CULTURE:   Lab Results   Component Value Date/Time    CULTRSULT  02/21/2025 03:00 PM     Mixed enteric ade ,000 cfu/mL  3 or more " organisms isolated, culture of doubtful  significance, please recollect.        PATHOLOGY  2/17/2023-bone fragment extracted from left lower extremity wound\\  FINAL DIAGNOSIS:     A. Left leg bone fragment at base of chronic wound:          Extensively degenerated fibrocartilaginous tissue with a rim of           fibrinopurulent debris          Correlate with culture findings            Comment: While no residual intact bone is identified, these           findings are suggestive of adjacent osteomyelitis.      ASSESSMENT AND PLAN:     1. Sacral decubitus ulcer, stage IV ([facility])  Comments: Ulcer first noted in early April 2022 as small open area which quickly enlarged.  This ulcer is present distal from previous sacral ulcer which healed after surgery in January.  Patient has history of flap reconstruction x2 to this area.    3/26/2025: Wound remains resolved, though at high risk for reoccurrence  - Continue to protect area with pressure relieving sacral foam dressing.  -Patient does spend a lot of time up in his wheelchair, and wishes to continue to do so for his quality of life.  He lives independently, drives, and is involved with family activities.    -His has a new cushion in his wheelchair.  Has yet to pick out a new wheelchair  - Known OM that was previously treated. CT scan done during recent hospitalization did not show OM of sacrum, however OM of the ischium noted.  -Patient is very well versed in pressure relief strategies  -Monitor site each clinic visit    Wound care: silicone adhesive foam dressing    2. Pressure injury of right ischium, stage 4 ([facility])  Comments: Abscess and OM found on CT during hospitalization in December 2023.  Patient underwent I&D with VAC placement.  IV antibiotics through 1/22/2024.    3/26/2025: Wound area measures larger after excision of skin flap from i distal wound edge increased granulation tissue noted  - Excisional debridement of nonviable tissue from wound in clinic today,  medically necessary to promote wound healing  - VAC discontinued previously.  -Patient to return to clinic weekly for assessment, debridement, and dressing change.    -Home health to change dressing 2 times per week in between clinic visits.    -Patient is very well versed on pressure relief measures, has adequate surface on bed, alternating low air loss.    Wound care: Hydrofera Blue Classic, Hydrofiber, Silicone foam    3. Pressure injury of left ischium, stage 4 ([facility])    3/26/2025: Wound area has decreased, increased granulation tissue noted  - Excisional debridement of wound in clinic today, medically necessary to promote wound healing.  - Patient to return to clinic weekly for assessment, debridement, and dressing change  -VAC has been discontinued  -Home health to change dressing 2 times per week in between clinic visits.    -Patient has new cushion in his wheelchair, see above  -Patient is very well versed on pressure relief measures, has adequate surface on bed, alternating low air loss.    Wound care: Hydrofera Blue Classic, Hydrofiber, Silicone foam    4. Other acute osteomyelitis, other site ([facility])    3/26/2025: Bone fragments removed during clinic visit in June were sent for pathology, polymicrobial, most concerning was an MDR ESBL Proteus.   -Patient completed IV antibiotics.  No further antibiotics at this time per ID  -Patient understands he has a very low threshold for infection/sepsis.  He understands he is to go to the emergency room immediately if he begins to experience fevers, chills, nausea or vomiting, or if he notices radiating erythema or purulent drainage from his wounds.    5. Quadriplegia, C5-C7 incomplete ([facility])  Comments: Complicating factor.  Impaired mobility and sensation  -Patient is still spending 7-8 hours/day up in his wheelchair and knows to reposition frequently.  Wears heel float boots bilaterally at all times  - Patient has new custom wheelchair seat. He had refitting and  appears he was not sitting back in chair enough which was causing undue pressure to IT. He is more aware of the correct positioning in chair.    6.  Leakage from urinary catheter, subsequent encounter    3/26/2025: No leakage over the past week.  Patient is receiving Botox injections every 6 months  -Recently started Botox injections, feels it is finally starting to work  -He discussed ileal conduit with urologist. They will be pursuing other therapies before considering.      Please note that this note may have been created using voice recognition software. I have worked with technical experts from [place] to optimize the interface.  I have made every reasonable attempt to correct obvious errors, but there may be errors of grammar and possibly content that I did not discover before finalizing the note.

## 2025-04-02 ENCOUNTER — OFFICE VISIT (OUTPATIENT)
Dept: WOUND CARE | Facility: MEDICAL CENTER | Age: 75
End: 2025-04-02
Payer: MEDICARE

## 2025-04-02 DIAGNOSIS — L89.154 SACRAL DECUBITUS ULCER, STAGE IV (HCC): ICD-10-CM

## 2025-04-02 DIAGNOSIS — L89.314 PRESSURE INJURY OF RIGHT ISCHIUM, STAGE 4 (HCC): ICD-10-CM

## 2025-04-02 DIAGNOSIS — M86.18 OTHER ACUTE OSTEOMYELITIS, OTHER SITE (HCC): ICD-10-CM

## 2025-04-02 DIAGNOSIS — T83.038D: ICD-10-CM

## 2025-04-02 DIAGNOSIS — L89.324 PRESSURE INJURY OF LEFT ISCHIUM, STAGE 4 (HCC): ICD-10-CM

## 2025-04-02 DIAGNOSIS — G82.54 QUADRIPLEGIA, C5-C7, INCOMPLETE (HCC): ICD-10-CM

## 2025-04-02 PROCEDURE — 11043 DBRDMT MUSC&/FSCA 1ST 20/<: CPT | Performed by: STUDENT IN AN ORGANIZED HEALTH CARE EDUCATION/TRAINING PROGRAM

## 2025-04-02 PROCEDURE — 11043 DBRDMT MUSC&/FSCA 1ST 20/<: CPT

## 2025-04-02 NOTE — PATIENT INSTRUCTIONS
-Keep your wound dressing clean, dry, and intact. Only change dressing if it's over saturated, soiled or falls off.     -Should you experience any significant changes in your wound(s), such as infection (redness, swelling, localized heat, increased pain, fever > 101 F, chills) or have any questions regarding your home care instructions, please contact the wound center at [phone]. If after hours, contact your primary care physician or go to the hospital emergency room.

## 2025-04-02 NOTE — PROGRESS NOTES
Provider Encounter- Pressure Injury        HISTORY OF PRESENT ILLNESS  Wound History:    START OF CARE IN CLINIC: 1/31/2024 (return to clinic after hospitalization)    REFERRING PROVIDER: Racquel York       WOUNDS-superior coccyx pressure injury, stage IV-resolved                                 Coccyx pressure injury, stage IV-resolved           Inferior coccyx pressure injury, stage IV resolved                                 Right ischial pressure injury, stage IV                                 Left heel pressure injury, recurring stage III-resolved                                 Left posterior lower leg/calf-stage III-resolved                                 Left medial lower leg surgical wound dehiscence-resolved, reopens frequently-resolved            Left ischial pressure injury, stage IV-first observed in clinic 5/1/2024          HISTORY: Patient with history of incomplete quadriplegia referred to [facility] for treatment of a stage IV pressure injury.  He has a history of previous pressure injuries to this area, and underwent muscle flaps in 2019, and then again in 2020.  He was seen in the wound clinic in November 2021 for an ulcer proximal from his current ulcer, and pressure injuries to his left posterior lower leg and left heel.  At that time, it was discovered that the patient had retained VAC foam embedded in the wound bed of the sacral wound.  Attempts were made to get him back to his plastic surgeon, though unsuccessful.  In January he underwent surgical removal of VAC sponge along with excisional debridement of his sacral wound by Dr. Chaves.  After the surgery, his wound went on to heal without incident.   In early April 2022, his home health nurse noted a new sacral ulcer, below the previous ulcer which quickly tripled in size over the following weeks.  The ulcer to his left medial lower leg had also deteriorated, with bone visible at the base..  He was hospitalized from 4/22 until 4/27/2022 and  underwent surgery with Dr. Raman on 4/26 for irrigation and debridement of multiple compartments of the left lower extremity, bone excision, and complex closure of chronic wound using biologic skin substitute.   His sacrococcygeal wound was not surgically addressed during this admission.  He was discharged back to his group home, with home health, and referral to outpatient wound clinic for his sacral wound.  He was instructed to follow-up with his surgeon for his lower leg wound.       Postoperatively, the left medial lower leg incision dehisced.  He was seen by his surgeon at [facility] on 5/11.  The surgeon opted to leave remaining sutures in place, and refer him to the wound clinic for treatment of this wound.   Treatment of this wound was initiated in clinic on 5/12.  During this visit was also noted that his heel DTI had resolved, but that he had a new pressure injury to his left posterior lower extremity.     A new pressure injury was noted to patient's right upper buttock/lower back on 5/20/2022.  Wound was linear in shape, skin discolored but intact.     Abrasion noted to left anterior lower leg.  First observed in clinic on 7/22/2022.  Patient states he bumped his leg into his food tray.     Small DTI noted to patient's left lateral lower leg on 7/29/2022.  Skin intact but discolored.     Large area of deep tissue injury noted to patient's left exterior lower leg.  Patient denied any trauma to this area.  Skin intact.  Wound documented.    1/27/2023: Patient was admitted to [facility] from 1/23-1/25/2023 with gross hematuria. He underwent RICHARD which showed watchman device was in place and he was taken off of Xarelto. While hospitalized wound team was consulted. He was referred back to [facility] and home health upon discharge.    Patient was hospitalized at [facility] for pyelonephritis from 2/26 until 3/2/2023, admitted for fever and general malaise.  He was admitted and initially started on linezolid and meropenem for suspected  UTI and history of multidrug-resistant organisms.  Urine cultures were negative. ID was consulted, recommended CT of chest and abdomen,which were negative for acute findings. However, he was treated with 5 days total course of antibiotics for suspected UTI, and symptoms completely resolved.  During this admission, the inpatient wound team was consulted for treatment of his sacral and lower leg wounds.  A wound culture was taken from his left heel pressure ulcer, negative.  Once stabilized, he was discharged home and referred back to [facility] to resume treatment of his wounds.    Patient was hospitalized at [facility] from 12/11 until 12/23/2023, admitted for fever.  Wound infection suspected.  CT scan of abdomen and pelvis for evaluation of sacral pressure injury showed gas tracking down to the bone consistent with osteomyelitis.  He underwent I&D of right ischial ulcer (documented as buttock) with Dr. Bansal, medial tract leading to an abscess was identified.  Cavity was opened allowing it to drain into the main wound bed.  Wound VAC was placed and managed by wound team during this admission.  A bone scan of patient's left foot was also done, initially concerning for osteomyelitis.  Orthopedic surgery was consulted and did not recommend surgical intervention. ID consulted also, recommended the patient to receive IV ertapenem 1 g every 24 hours plus IV daptomycin 8 mg/kg every 24 hours through 1/22/2024.   He was discharged to [facility] on 1/23 for IV antibiotics and wound care.  From the LTAC he was discharged home on 1/22 with home health and referral back to [facility] to resume management of his wounds  .      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  Never used smokeless tobacco    Patient's problem list, allergies, and current medications reviewed and updated in Epic    Interval History:  Interval History thinned 7/29/2022.  Please see previous notes for complete interval history.   Interval History thinned 1/27/2023. Please see previous note for complete interval history.  Interval History thinned 3/3/2023.  Please see previous notes for complete interval history.    Interval History thinned 8/4/2023.  Please see previous notes for complete interval history.    Interval History thinned 1/31/2024.  Please see previous notes for complete interval history.    Interval History thinned 6/26/2024.  Please see previous notes for complete interval history.      6/19/2024: Clinic visit with Steve Hodges MD. Patient denies any fevers or chills. Reports he is tolerating Abx. He has appointment with ID later today to discuss OM treatment. He is tolerating wound VAC. Slight bone exposed bilaterally in ischial wounds, slightly worse.    6/26/2024 : Clinic visit with ADILSON Arthur, FNPEGGY-BC, CWDINESHN, CFTRACI.   Patient presents today with significant deterioration of his both ischial wounds, with increased depth of undermining.   He now has a PICC line, and will be starting outpatient infusions of ertapenem later today.  He states he is feeling well, denies fevers, chills, nausea, vomiting, cough or shortness of breath.  Due to deterioration of his wound, we did discuss possibly having him go over the hospital for surgical debridement and IV antibiotics.  He was hesitant to do so.  We agreed to see how he looks after a week or so IV antibiotics.  He is agreeable to minimizing time up in his wheelchair.  He also agrees to go to the emergency room immediately if he develops any signs or symptoms of infection.    7/10/2024: Clinic visit with Steve Hodges MD. Patient reports feeling in normal state of health. He missed  last appointment as he had fall out of wheelchair and was evaluated in ED. He denies any injuries from fall. Patient wounds are measuring slightly smaller. He continues on Ertapenem. Patient reports that he has appointment for seating evaluation from [facility] scheduled, but does not recall the date.    7/17/2024 : Clinic visit with ADILSON Arthur, HOLDEN, LILIYA VALERIO.   Anjum states he is feeling well.  Left ischial wound measures significantly smaller today, however measurements vary somewhat depending on pt's position.  Both wounds appear to be progressing slightly, with improving tissue quality.    7/24/2024 : Clinic visit with ADILSON Arthur, HOLDEN, IMAN, LILIYA.   Patient continues to feel well, offers no complaints.  Right ischial wound measures smaller, left ischial wound is larger.  Patient tolerating VAC without any difficulty.  Home health continues to see him in between clinic visits.  He is still receiving daily infusions of IV antibiotics, will be completing these in August.    7/31/2024 : Clinic visit with ADILSON Arthur, HOLDNE, IMAN, LILIYA.   Anjum continues to feel well, his wounds are slowly progressing.  Tolerating VAC.  He is on IV antibiotics for 1 more week.  Admits that he is up in his wheelchair for 10 to 14 hours/day.    8/7/2024 : Clinic visit with ADILSON Arthur FNP-BC, LILIYA VALERIO.   Anjum states he is feeling well today, offers no complaints.  Both wounds measure a bit smaller today, new granulation tissue noted.  He will be getting his last IV antibiotic infusion today.    8/14/2024 : Clinic visit with ADILSON Arthur FNP-BC, IMAN, LILIYA.   Patient continues to feel well.  He has completed his antibiotics, followed up with ID earlier this week, no further antibiotic therapy at this time.  Ischial wounds are slowly progressing.  Lower extremity wounds remain resolved.    8/21/2024 : Clinic visit with ADILSON Arthur FNP-BC, LILIYA VALERIO.   Anjum states he is feeling  well, offers no complaints.  His wounds are slowly progressing, increased granulation.    8/28/2024 : Clinic visit with ADILSON Arthur, HOLDEN, LILIYA VALERIO.   Turk states he is feeling well overall.  His wounds measure slightly smaller.  He has had some urinary symptoms, reports leakage from around his suprapubic catheter last night, and excessively full urine bag.  He has been in contact with his urologist office.  He also mentions that he has a recurring small scab to his nose, states that it falls off only to return shortly after.  This has been going on for several months.  I offered to refer him to dermatology.    9/11/2024 : Clinic visit with ADILSON Arthur, HOLDEN, LILIYA VALEIRO.   Turk states he is feeling well.  He missed his appointment last week due to car troubles.  His vehicle is now running well.  Ischial wounds show some improvement, increased granulation tissue.  He does have a small reopening of left posterior lower leg wound, appears to be a small abrasion over area of scar tissue.    9/18/2024: Clinic visit with Steve Hodges MD. Patient reports doing ok. Denies any acute issues with wound VAC. Reports that he was fitted by [employer] for new seat cushion and is being manufactured, he is not sure when will be ready. Patient's wounds appears slightly improved. Left posterior leg wound remains open.    9/25/2024 : Clinic visit with ADILSON Arthur, HOLDEN, IMAN, LILIYA.   Patient states he is feeling well.  His wounds measure about the same.    10/2/2024: Clinic visit with HOLDEN Johnson CWON, LILIYA.  Pt denies fevers, chills, nausea, vomiting. Bilateral ischial ulcers increased in area.  Left IT quality overall worse compared to right IT.  Recommend Dakin's soak.  Continue with VAC to bilateral IT.    10/9/2024 : Clinic visit with ADILSON Arthur, HOLDEN, IMAN, LILIYA.   Patient continues to feel well overall.  His wounds measure about the same, no evidence of infection.   He does have some minor breakdown over the scar tissue of his sacrum which bears watching.  He has not heard from [employer] about his seat cushion, states he will contact them again.    10/16/2024 : Clinic visit with ADILSON Arthur, HOLDEN, IMAN, LILIYA.   Anjum continues to feel well.  His wounds measure slightly smaller.  Sacral wound just slightly open.  He called [employer] earlier this week, was told his new cushion is ready, and that they would deliver it next Monday.  He also states he is due for a new wheelchair, but has not yet become process.      10/23/2024 : Clinic visit with ADILSON Arthur, HOLDEN, IMAN, LILIYA.   Anjum's wounds present today with significantly more odor.  He states he is feeling fine, denies fevers, chills, nausea, vomiting, cough or shortness of breath.  Increased drainage noted.  Wounds measure about the same.  Culture collected in clinic today after debridement.  VAC placed on hold.  Wounds packed with Puracyn moistened silver Hydrofiber.   Patient reminded that he is to go to the emergency room if he notices any increased redness, swelling, drainage or odor, or if he develops fever, chills, nausea or vomiting.    He has a new cushion to his wheelchair.  States that he does not yet have a new wheelchair, will be picking on out the next few weeks.    10/30/2024 : Clinic visit with ADILSON Arthur, HOLDEN, IMAN, LILIYA.   Anjum states he is feeling well.  Wound odor still noticeable.  Culture collected last visit was positive for light growth of Proteus.  Given continued odor, will go ahead and prescribe short course of Augmentin, no other p.o. options available based on sensitivities.  Continue with Dakin's wound cleansing.  Home health to restart VAC on Friday 11/6/2024: Clinic visit with Steve Hodges MD. Patient reports doing well, denies any acute issues. Tolerating augmentin well without side effects. Odor much improved today. Wounds are improving slowly. Continue wound  VAC.    11/14/2024: Clinic visit with Steve Hodges MD. Patient reports doing ok. He was frustrated coming into clinic, was having trouble exiting his van. No evidence of wound infection today    11/20/2024: Clinic visit with Steve Hodges MD. Patient reports feeling in normal state of health. His ischial wounds stable and slowly improving. He has reopened sacral wound however. Denies any signs or symptoms of infection.    11/27/2024: Clinic visit with Steve Hodges MD. Patient reports doing well, denies any acute issues. Sacral wound measuring smaller. Right ischial wound smaller. Left ischial wound larger with increased slough and necrotic tissue at base of wound. Patient has contacted [email or web address] technician to evaluate seat due to reopening of sacrum, he is pending call back.    12/4/2024: Clinic visit with Steve Hodges MD. Patient reports doing well, denies any signs or symptoms of infection. Denies any issues with wound VAC. Sacrum has resolved. Right ischium wound larger with necrotic tissue, left ischium stable. He denies any traumatic events or any changes to his routine that would have increased pressure on right ischium besides time spent in chair during thanksgiving with family.    12/11/2024: Clinic visit with Steve Hodges MD. Patient reports doing well, denies any acute issues. Wounds are stable. No further necrosis of right ischium. Patient denies any signs or symptoms of infection.    12/18/2024: Clinic visit with Steve Hodges MD. Patient reports doing ok. Reports was diagnosed with UTI by urology, picking up Abx later today, thinks that he was prescribed Augmentin. Right ischial wound measuring larger, dusky tissue concerning for increased pressure. He reports that he has been up in wheelchair more this week with friend in town and has not been using tilt feature of chair as frequently. He was encouraged to spend more time offloading.   Due to holiday's, and dressings only going to be  changed 2x weekly, it is medically necessary to continue wound VAC for now as it would be severely problematic for patient to be sitting with saturated dressings during this period.    1/8/2025: Clinic visit with Steve Hodges MD. Patient reports chest congestion with low grade fevers for last few days. Denies any issues with wounds. He has increased necrosis of left IT pressure injury, he denies any changes in activity and is using tilt feature in chair every 30 min. Patient reports that he has appointment with [facility] next Friday to re-evaluate custom seat.    1/15/2025: Clinic visit with Steve Hodges MD. Patient returns to clinic following hospitalization for mycoplasma pneumonia.  Patient reports that he is feeling better denies any fevers or chills currently.  Patient has upcoming seating eval by [employer].  Wound VAC has been held since last week, wound seem to be slightly improved with holding VAC. Will continue to hold this this week.    1/22/2025: Clinic visit with Steve Hodges MD. Patient reports doing well, denies any acute issues. He had seat cushion re-evaluation and the technician noted that he had no hot spots when he was back far enough in chair, but if he slides forward then he is putting pressure on ischial tuberosities which explains the concern for pressure noted last few weeks. Patient will keep wheelchair slightly tilted back. Wounds with increased granulation tissue. Discontinue wound VAC and he will return to .    1/29/2025: Clinic visit with Steve Hodges MD. Patient reports doing ok, denies any signs or symptoms of infection. Patient assures me that he is sitting further back in chair as recommended by PT/wheel chair technician. Wounds are slowly improving, he reports less drainage. Will trial use of Hydrofiber instead of Enluxtra.    2/5/2025: Clinic visit with Steve Hodges MD. Patient reports doing well. Wounds are not overly macerated after holding Enluxtra,  continue hydrofiber.    2/12/2025: Clinic visit with Steve Hodges MD. Patient reports doing ok. Reports that Monday night his catheter was obstructed and caused leak saturating dressings. His group home changed and dried him, but dressings remained saturated. If something like this should occur again, patient will contact home health for dressing change.    2/19/2025 : Clinic visit with ADILSON Arthur, HOLDEN, IMAN, LILIYA.   Chart presents today saturated in urine due to leakage from his suprapubic catheter.  He states this is a frequent event due to bladder spasms.  He has seen his urologist several times, recently started getting Botox injections, does not feel this has helped.  I suggest that he consider getting a urostomy, would divert from bladder altogether, and if well created, would result in better containment of his urine.  He states he has an appointment with his urologist next month and will discuss with him.   His wounds continue to progress, but both measure smaller.    2/26/2025: Clinic visit with Steve Hodges MD. Patient reports doing well, denies any acute issues. Still having occasional leaking from catheter. Patient wounds measure about the same, however tissue quality has improved.     3/5/2025: Clinic visit with Steve Hodges MD. Patient reports doing well, denies any acute issues. Patients wounds are measuring smaller.  Right ischium wound is hypergranular.    3/12/2025 : Clinic visit with ADILSON Arthur, HOLDEN, IMAN, LILIYA.   States he is feeling well overall.  He is not having as much leakage from his suprapubic catheter.  However, he has an appointment with his urologist tomorrow, and will be discussing possible urostomy for better management of his urine.     His wounds continue to progress, significant improvement since modification to wheelchair cushion.    3/19/2025: Clinic visit with Steve Hodegs MD. Patient reports doing well. He botox injections in bladder to  decrease leakage form catheter. He discussed urostomy with urologist, but they are going to try additional treatments before considering. Patient wounds continue to make slow improvement.    3/26/2025 : Clinic visit with ADILSON Arthur, DAT-BC, CWDINESHN, CFCN.   Anjum continues to feel well.  He has been having much less drainage from his suprapubic catheter, receiving Botox injections every 6 months.  His ischial wounds are steadily progressing.  Right ischial wound measures slightly larger due to excision of skin flap from distal wound edge.    4/2/2025: Clinic visit with Steve Hodges MD. Patient reports doing ok. He denies any signs or symptoms of infection. His right ischial wound with increased dusky appearance of tissue. He received new wheelchair which is same model as previous and is compatible with his custom cushion which he has been using. Assessed seat and chair today, appears to be satisfactory. Wounds slightly larger with mild increase in odor. Discussed using puracyn gel for additional antimicrobial coverage.    REVIEW OF SYSTEMS:   Unchanged from previous wound clinic assessment on 3/26/2025, except as noted in interval history above.      PHYSICAL EXAMINATION:   There were no vitals taken for this visit.  Physical Exam  Constitutional:       Appearance: He is obese.   Cardiovascular:      Rate and Rhythm: Normal rate.   Pulmonary:      Effort: Pulmonary effort is normal.   Abdominal:      Comments: Colostomy left lower quadrant   Genitourinary:     Comments: Suprapubic catheter to down drain   Skin:     Comments: Stage IV pressure injury to right ischium: Wound measures slightly larger and tissue slightly dusky. Thin layer of slough to wound bed.  Moderate serosanguineous drainage, mild odor. No periwound erythema or induration    Stage IV pressure injury of left ischium: Wound slightly larger. Thin layer of slough to wound bed. Moderate serosanguineous drainage with mild odor. No evidence of  soft tissue infection    Stage IV pressure injury sacrum: Remains resolved    All other wounds remain healed, high risk for recurrence     Neurological:      Mental Status: He is alert and oriented to person, place, and time.   Psychiatric:         Mood and Affect: Mood normal.       WOUND ASSESSMENT  Wound 12/12/23 Pressure Injury Ischium Right (Active)   Wound Image    04/02/25 1559   Site Assessment Light Purple;Pink;Red;Undermining 04/02/25 1559   Periwound Assessment Maceration;Scar tissue 04/02/25 1559   Margins Unattached edges 04/02/25 1559   Closure Secondary intention 04/02/25 1559   Drainage Amount Large 04/02/25 1559   Drainage Description Serosanguineous 04/02/25 1559   Treatments Cleansed;Provider debridement;Silver nitrate;Irrigation;Site care 04/02/25 1559   Offloading/DME Other (comment) 04/02/25 1559   Wound Cleansing Hypochlorus Acid 04/02/25 1559   Periwound Protectant No-sting Skin Prep;Barrier Paste 04/02/25 1559   Dressing Changed Changed 04/02/25 1559   Dressing Cleansing/Solutions Normal Saline;Other (Comments) 04/02/25 1559   Dressing Options Puracyn Gel;Hydrofera Blue Classic;Offloading Dressing - Sacral 04/02/25 1559   Dressing Change/Treatment Frequency Monday, Wednesday, Friday, and As Needed 04/02/25 1559   WOUND NURSE ONLY - Pressure Injury Stage 4 04/02/25 1559   Non-staged Wound Description Not applicable 04/02/25 1559   Wound Length (cm) 4 cm 04/02/25 1559   Wound Width (cm) 2 cm 04/02/25 1559   Wound Depth (cm) 0.4 cm 04/02/25 1559   Wound Surface Area (cm^2) 8 cm^2 04/02/25 1559   Wound Volume (cm^3) 3.2 cm^3 04/02/25 1559   Post-Procedure Length (cm) 4 cm 04/02/25 1559   Post-Procedure Width (cm) 2.1 cm 04/02/25 1559   Post-Procedure Depth (cm) 0.4 cm 04/02/25 1559   Post-Procedure Surface Area (cm^2) 8.4 cm^2 04/02/25 1559   Post-Procedure Volume (cm^3) 3.36 cm^3 04/02/25 1559   Wound Healing % 94 04/02/25 1559   Tunneling (cm) 0 cm 04/02/25 1559   Tunneling Clock Position of  Wound 6 03/26/25 1500   Undermining (cm) 2 cm 04/02/25 1559   Undermining of Wound, 1st Location From 4 o'clock;To 8 o'clock 04/02/25 1559   Undermining (cm) - 2nd location 0.5 cm 02/19/25 1500   Undermining of Wound, 2nd Location From 7 o'clock;From 12 o'clock;To 2 o'clock 02/19/25 1500   Wound Odor Foul 04/02/25 1559   Exposed Structures None 04/02/25 1559   Number of days: 478       Wound 05/01/24 Pressure Injury Ischium Left (Active)   Wound Image    04/02/25 1559   Site Assessment Pink;Red 04/02/25 1559   Periwound Assessment Maceration;Scar tissue 04/02/25 1559   Margins Unattached edges 04/02/25 1559   Closure Secondary intention 04/02/25 1559   Drainage Amount Large 04/02/25 1559   Drainage Description Serosanguineous 04/02/25 1559   Treatments Cleansed;Topical Lidocaine;Provider debridement;Site care 04/02/25 1559   Offloading/DME Other (comment) 04/02/25 1559   Wound Cleansing Hypochlorus Acid 04/02/25 1559   Periwound Protectant No-sting Skin Prep;Barrier Paste 04/02/25 1559   Dressing Changed Changed 04/02/25 1559   Dressing Cleansing/Solutions Normal Saline;Other (Comments) 04/02/25 1559   Dressing Options Puracyn Gel;Hydrofera Blue Classic;Hydrofiber;Offloading Dressing - Sacral 04/02/25 1559   Dressing Change/Treatment Frequency Monday, Wednesday, Friday, and As Needed 04/02/25 1559   Wound Team Following Weekly 12/18/24 1700   WOUND NURSE ONLY - Pressure Injury Stage 4 04/02/25 1559   Non-staged Wound Description Not applicable 04/02/25 1559   Wound Length (cm) 5 cm 04/02/25 1559   Wound Width (cm) 1.7 cm 04/02/25 1559   Wound Depth (cm) 2.8 cm 04/02/25 1559   Wound Surface Area (cm^2) 8.5 cm^2 04/02/25 1559   Wound Volume (cm^3) 23.8 cm^3 04/02/25 1559   Post-Procedure Length (cm) 5 cm 04/02/25 1559   Post-Procedure Width (cm) 1.5 cm 04/02/25 1559   Post-Procedure Depth (cm) 2.8 cm 04/02/25 1559   Post-Procedure Surface Area (cm^2) 7.5 cm^2 04/02/25 1559   Post-Procedure Volume (cm^3) 21 cm^3  "04/02/25 1559   Wound Healing % -[id number] 04/02/25 1559   Tunneling (cm) 0 cm 04/02/25 1559   Undermining (cm) 0 cm 04/02/25 1559   Undermining of Wound, 1st Location From 4 o'clock;To 10 o'clock 12/18/24 1700   Wound Odor Foul 04/02/25 1559   Exposed Structures None 04/02/25 1559   Number of days: 337       Wound 01/09/25 Other (comment) Coccyx Left;Right (Active)   Number of days: 84       Wound 01/09/25 Pressure Injury Sacrum (Active)   Number of days: 84       Wound 01/09/25 Other (comment) Toe, 3rd;Toe, 4th Left (Active)   Number of days: 84       PROCEDURE: Excisional debridement of right and left ischial wounds  -2% viscous lidocaine applied topically to wound bed for approximately 5 minutes prior to debridement  -Scissors and forceps used to excise small flap of skin over distal edge of right ischial wound.  -Curette then used to debride both wound beds.  Excisional debridement was performed to remove devitalized tissue until healthy, bleeding tissue was visualized.  Total area debrided was 15.9 cm², including into undermined areas of wounds.  Tissue debrided into the muscle / fascia layer.    -Bleeding controlled with manual pressure.  -Wound care completed by wound RN, refer to flowsheet  -Patient tolerated the procedure well, without c/o pain or discomfort.    Pertinent Labs and Diagnostics:    Labs:     A1c: No results found for: \"HBA1C\"     [facility] results, 7/1/2022 (under media tab)    CRP 13    ESR 31      IMAGING:     X-ray left tib-fib ordered 2/16/2024 through [facility]    12/11/2023-CT of abdomen pelvis with contrast  IMPRESSION:   1.  Right ischial decubitus ulcer extending to bone with soft tissue gas tracking along the right perineum. Appearance suggesting osteomyelitis, consider component of necrotizing fasciitis as clinically appropriate.  2.  Small pericardial effusion  3.  Left adrenal nodule, density on prior noncontrast CT demonstrates adenoma.  4.  Hepatomegaly  5.  Enlarged " prostate, workup and evaluation for causes of prostate enlargement recommended as clinically appropriate.  6.  Atherosclerosis and atherosclerotic coronary artery disease    12/15/2023-bone scan of left foot  IMPRESSION:     1.  Mild increased activity in the LEFT 1st and 3rd toes on blood pool and delayed images possibly indicating inflammation/infection.  2.  No significant blood flow asymmetry.          VASCULAR STUDIES: No results found.    LAST  WOUND CULTURE:   Lab Results   Component Value Date/Time    CULTRSULT  02/21/2025 03:00 PM     Mixed enteric ade ,000 cfu/mL  3 or more organisms isolated, culture of doubtful  significance, please recollect.        PATHOLOGY  2/17/2023-bone fragment extracted from left lower extremity wound\\  FINAL DIAGNOSIS:     A. Left leg bone fragment at base of chronic wound:          Extensively degenerated fibrocartilaginous tissue with a rim of           fibrinopurulent debris          Correlate with culture findings            Comment: While no residual intact bone is identified, these           findings are suggestive of adjacent osteomyelitis.      ASSESSMENT AND PLAN:     1. Sacral decubitus ulcer, stage IV ([facility] [place])  Comments: Ulcer first noted in early April 2022 as small open area which quickly enlarged.  This ulcer is present distal from previous sacral ulcer which healed after surgery in January.  Patient has history of flap reconstruction x2 to this area.    4/2/2025: Wound remains resolved, though at high risk for reoccurrence  - Continue to protect area with pressure relieving sacral foam dressing.  -Patient does spend a lot of time up in his wheelchair, and wishes to continue to do so for his quality of life.  He lives independently, drives, and is involved with family activities.    -His has a new cushion in his wheelchair.  Has yet to pick out a new wheelchair  - Known OM that was previously treated. CT scan done during recent hospitalization did not show OM  of sacrum, however OM of the ischium noted.  -Patient is very well versed in pressure relief strategies  -Monitor site each clinic visit    Wound care: silicone adhesive foam dressing    2. Pressure injury of right ischium, stage 4 ([facility] [place])  Comments: Abscess and OM found on CT during hospitalization in December 2023.  Patient underwent I&D with VAC placement.  IV antibiotics through 1/22/2024.    4/2/2025: Wound area measures larger, some evidence of increased pressure today.  - Excisional debridement of nonviable tissue from wound in clinic today, medically necessary to promote wound healing  - VAC discontinued previously.  -Patient to return to clinic weekly for assessment, debridement, and dressing change.    -Home health to change dressing 2 times per week in between clinic visits.    -Patient is very well versed on pressure relief measures, has adequate surface on bed, alternating low air loss.    Wound care: Puracyn gel, Hydrofera Blue Classic, Hydrofiber, Silicone foam    3. Pressure injury of left ischium, stage 4 ([facility] [place])    4/2/2025: Wound measuring larger  - Excisional debridement of wound in clinic today, medically necessary to promote wound healing.  - Patient to return to clinic weekly for assessment, debridement, and dressing change  -VAC has been discontinued  -Home health to change dressing 2 times per week in between clinic visits.    -Patient has new cushion in his wheelchair, see above  -Patient is very well versed on pressure relief measures, has adequate surface on bed, alternating low air loss.    Wound care: Puracyn Gel, Hydrofera Blue Classic, Hydrofiber, Silicone foam    4. Other acute osteomyelitis, other site ([facility] [place])    4/2/2025: Bone fragments removed during clinic visit in June were sent for pathology, polymicrobial, most concerning was an MDR ESBL Proteus.   -Patient completed IV antibiotics.  No further antibiotics at this time per ID  -Patient understands he has a very low threshold for  infection/sepsis.  He understands he is to go to the emergency room immediately if he begins to experience fevers, chills, nausea or vomiting, or if he notices radiating erythema or purulent drainage from his wounds.    5. Quadriplegia, C5-C7 incomplete (HCC)  Comments: Complicating factor.  Impaired mobility and sensation  -Patient is still spending 7-8 hours/day up in his wheelchair and knows to reposition frequently.  Wears heel float boots bilaterally at all times  - Patient has new custom wheelchair seat. He had refitting and appears he was not sitting back in chair enough which was causing undue pressure to IT. He is more aware of the correct positioning in chair.    6.  Leakage from urinary catheter, subsequent encounter    4/2/2025:   - Denies any leaks currently  -Recently started Botox injections, feels it is finally starting to work  -He discussed ileal conduit with urologist. They will be pursuing other therapies before considering.      Please note that this note may have been created using voice recognition software. I have worked with technical experts from [employer] to optimize the interface.  I have made every reasonable attempt to correct obvious errors, but there may be errors of grammar and possibly content that I did not discover before finalizing the note.

## 2025-04-08 PROBLEM — I77.810 ASCENDING AORTA DILATATION (HCC): Status: ACTIVE | Noted: 2025-04-08

## 2025-04-09 ENCOUNTER — OFFICE VISIT (OUTPATIENT)
Dept: WOUND CARE | Facility: MEDICAL CENTER | Age: 75
End: 2025-04-09
Payer: MEDICARE

## 2025-04-09 DIAGNOSIS — M86.18 OTHER ACUTE OSTEOMYELITIS, OTHER SITE (HCC): ICD-10-CM

## 2025-04-09 DIAGNOSIS — L89.324 PRESSURE INJURY OF LEFT ISCHIUM, STAGE 4 (HCC): ICD-10-CM

## 2025-04-09 DIAGNOSIS — L89.154 SACRAL DECUBITUS ULCER, STAGE IV (HCC): ICD-10-CM

## 2025-04-09 DIAGNOSIS — L89.314 PRESSURE INJURY OF RIGHT ISCHIUM, STAGE 4 (HCC): ICD-10-CM

## 2025-04-09 DIAGNOSIS — G82.54 QUADRIPLEGIA, C5-C7, INCOMPLETE (HCC): ICD-10-CM

## 2025-04-09 PROCEDURE — 11042 DBRDMT SUBQ TIS 1ST 20SQCM/<: CPT

## 2025-04-09 PROCEDURE — 11043 DBRDMT MUSC&/FSCA 1ST 20/<: CPT

## 2025-04-09 PROCEDURE — 11043 DBRDMT MUSC&/FSCA 1ST 20/<: CPT | Performed by: STUDENT IN AN ORGANIZED HEALTH CARE EDUCATION/TRAINING PROGRAM

## 2025-04-09 NOTE — PROGRESS NOTES
Provider Encounter- Pressure Injury        HISTORY OF PRESENT ILLNESS  Wound History:    START OF CARE IN CLINIC: 1/31/2024 (return to clinic after hospitalization)    REFERRING PROVIDER: Racquel York       WOUNDS-superior coccyx pressure injury, stage IV-resolved                                 Coccyx pressure injury, stage IV-resolved           Inferior coccyx pressure injury, stage IV resolved                                 Right ischial pressure injury, stage IV                                 Left heel pressure injury, recurring stage III-resolved                                 Left posterior lower leg/calf-stage III-resolved                                 Left medial lower leg surgical wound dehiscence-resolved, reopens frequently-resolved            Left ischial pressure injury, stage IV-first observed in clinic 5/1/2024          HISTORY: Patient with history of incomplete quadriplegia referred to [facility] for treatment of a stage IV pressure injury.  He has a history of previous pressure injuries to this area, and underwent muscle flaps in 2019, and then again in 2020.  He was seen in the wound clinic in November 2021 for an ulcer proximal from his current ulcer, and pressure injuries to his left posterior lower leg and left heel.  At that time, it was discovered that the patient had retained VAC foam embedded in the wound bed of the sacral wound.  Attempts were made to get him back to his plastic surgeon, though unsuccessful.  In January he underwent surgical removal of VAC sponge along with excisional debridement of his sacral wound by Dr. Chaves.  After the surgery, his wound went on to heal without incident.   In early April 2022, his home health nurse noted a new sacral ulcer, below the previous ulcer which quickly tripled in size over the following weeks.  The ulcer to his left medial lower leg had also deteriorated, with bone visible at the base..  He was hospitalized from 4/22 until 4/27/2022 and  underwent surgery with Dr. Raman on 4/26 for irrigation and debridement of multiple compartments of the left lower extremity, bone excision, and complex closure of chronic wound using biologic skin substitute.   His sacrococcygeal wound was not surgically addressed during this admission.  He was discharged back to his group home, with home health, and referral to outpatient wound clinic for his sacral wound.  He was instructed to follow-up with his surgeon for his lower leg wound.       Postoperatively, the left medial lower leg incision dehisced.  He was seen by his surgeon at [facility] on 5/11.  The surgeon opted to leave remaining sutures in place, and refer him to the wound clinic for treatment of this wound.   Treatment of this wound was initiated in clinic on 5/12.  During this visit was also noted that his heel DTI had resolved, but that he had a new pressure injury to his left posterior lower extremity.     A new pressure injury was noted to patient's right upper buttock/lower back on 5/20/2022.  Wound was linear in shape, skin discolored but intact.     Abrasion noted to left anterior lower leg.  First observed in clinic on 7/22/2022.  Patient states he bumped his leg into his food tray.     Small DTI noted to patient's left lateral lower leg on 7/29/2022.  Skin intact but discolored.     Large area of deep tissue injury noted to patient's left exterior lower leg.  Patient denied any trauma to this area.  Skin intact.  Wound documented.    1/27/2023: Patient was admitted to [facility] from 1/23-1/25/2023 with gross hematuria. He underwent RICHARD which showed watchman device was in place and he was taken off of Xarelto. While hospitalized wound team was consulted. He was referred back to [facility] and home health upon discharge.    Patient was hospitalized at [facility] for pyelonephritis from 2/26 until 3/2/2023, admitted for fever and general malaise.  He was admitted and initially started on linezolid and meropenem for suspected  UTI and history of multidrug-resistant organisms.  Urine cultures were negative. ID was consulted, recommended CT of chest and abdomen,which were negative for acute findings. However, he was treated with 5 days total course of antibiotics for suspected UTI, and symptoms completely resolved.  During this admission, the inpatient wound team was consulted for treatment of his sacral and lower leg wounds.  A wound culture was taken from his left heel pressure ulcer, negative.  Once stabilized, he was discharged home and referred back to [facility] to resume treatment of his wounds.    Patient was hospitalized at [facility] from 12/11 until 12/23/2023, admitted for fever.  Wound infection suspected.  CT scan of abdomen and pelvis for evaluation of sacral pressure injury showed gas tracking down to the bone consistent with osteomyelitis.  He underwent I&D of right ischial ulcer (documented as buttock) with Dr. Bansal, medial tract leading to an abscess was identified.  Cavity was opened allowing it to drain into the main wound bed.  Wound VAC was placed and managed by wound team during this admission.  A bone scan of patient's left foot was also done, initially concerning for osteomyelitis.  Orthopedic surgery was consulted and did not recommend surgical intervention. ID consulted also, recommended the patient to receive IV ertapenem 1 g every 24 hours plus IV daptomycin 8 mg/kg every 24 hours through 1/22/2024.   He was discharged to [facility] on 1/23 for IV antibiotics and wound care.  From the LTAC he was discharged home on 1/22 with home health and referral back to [facility] to resume management of his wounds  .      Pertinent Medical History: Incomplete quadriplegia, history of stage IV pressure injuries, history of flap procedures to sacral pressure injuries, osteomyelitis, obesity, colostomy in place   Contributing factors: Immobility and Obesity, impaired sensation    Personal support: Attendant-staff at skilled nursing and home health  nursing    TOBACCO USE:   Former smoker, quit in 1977.  Never used smokeless tobacco    Patient's problem list, allergies, and current medications reviewed and updated in Epic    Interval History:  Interval History thinned 7/29/2022.  Please see previous notes for complete interval history.   Interval History thinned 1/27/2023. Please see previous note for complete interval history.  Interval History thinned 3/3/2023.  Please see previous notes for complete interval history.    Interval History thinned 8/4/2023.  Please see previous notes for complete interval history.    Interval History thinned 1/31/2024.  Please see previous notes for complete interval history.    Interval History thinned 6/26/2024.  Please see previous notes for complete interval history.      6/19/2024: Clinic visit with Steve Hodges MD. Patient denies any fevers or chills. Reports he is tolerating Abx. He has appointment with ID later today to discuss OM treatment. He is tolerating wound VAC. Slight bone exposed bilaterally in ischial wounds, slightly worse.    6/26/2024 : Clinic visit with ADILSON Arthur, FNPEGGY-BC, CWDINESHN, CFTRACI.   Patient presents today with significant deterioration of his both ischial wounds, with increased depth of undermining.   He now has a PICC line, and will be starting outpatient infusions of ertapenem later today.  He states he is feeling well, denies fevers, chills, nausea, vomiting, cough or shortness of breath.  Due to deterioration of his wound, we did discuss possibly having him go over the hospital for surgical debridement and IV antibiotics.  He was hesitant to do so.  We agreed to see how he looks after a week or so IV antibiotics.  He is agreeable to minimizing time up in his wheelchair.  He also agrees to go to the emergency room immediately if he develops any signs or symptoms of infection.    7/10/2024: Clinic visit with Steve Hodges MD. Patient reports feeling in normal state of health. He missed  last appointment as he had fall out of wheelchair and was evaluated in ED. He denies any injuries from fall. Patient wounds are measuring slightly smaller. He continues on Ertapenem. Patient reports that he has appointment for seating evaluation from [facility] scheduled, but does not recall the date.    7/17/2024 : Clinic visit with ADILSON Arthur, HOLDEN, LILIYA VALERIO.   Anjum states he is feeling well.  Left ischial wound measures significantly smaller today, however measurements vary somewhat depending on pt's position.  Both wounds appear to be progressing slightly, with improving tissue quality.    7/24/2024 : Clinic visit with ADILSON Arthur, HOLDEN, IMAN, LILIYA.   Patient continues to feel well, offers no complaints.  Right ischial wound measures smaller, left ischial wound is larger.  Patient tolerating VAC without any difficulty.  Home health continues to see him in between clinic visits.  He is still receiving daily infusions of IV antibiotics, will be completing these in August.    7/31/2024 : Clinic visit with ADILSON Arthur, HOLDEN, IMAN, LILIYA.   Anjum continues to feel well, his wounds are slowly progressing.  Tolerating VAC.  He is on IV antibiotics for 1 more week.  Admits that he is up in his wheelchair for 10 to 14 hours/day.    8/7/2024 : Clinic visit with ADILSON Arthur FNP-BC, LILIYA VALERIO.   Anjum states he is feeling well today, offers no complaints.  Both wounds measure a bit smaller today, new granulation tissue noted.  He will be getting his last IV antibiotic infusion today.    8/14/2024 : Clinic visit with ADILSON Arthur FNP-BC, IMAN, LILIYA.   Patient continues to feel well.  He has completed his antibiotics, followed up with ID earlier this week, no further antibiotic therapy at this time.  Ischial wounds are slowly progressing.  Lower extremity wounds remain resolved.    8/21/2024 : Clinic visit with ADILSON Arthur FNP-BC, LILIYA VALERIO.   Anjum states he is feeling  well, offers no complaints.  His wounds are slowly progressing, increased granulation.    8/28/2024 : Clinic visit with ADILSON Arthur, HOLDEN, LILIYA VALERIO.   Turk states he is feeling well overall.  His wounds measure slightly smaller.  He has had some urinary symptoms, reports leakage from around his suprapubic catheter last night, and excessively full urine bag.  He has been in contact with his urologist office.  He also mentions that he has a recurring small scab to his nose, states that it falls off only to return shortly after.  This has been going on for several months.  I offered to refer him to dermatology.    9/11/2024 : Clinic visit with ADILSON Arthur, HOLDEN, LILIYA VALERIO.   Turk states he is feeling well.  He missed his appointment last week due to car troubles.  His vehicle is now running well.  Ischial wounds show some improvement, increased granulation tissue.  He does have a small reopening of left posterior lower leg wound, appears to be a small abrasion over area of scar tissue.    9/18/2024: Clinic visit with Steve Hodges MD. Patient reports doing ok. Denies any acute issues with wound VAC. Reports that he was fitted by [employer] for new seat cushion and is being manufactured, he is not sure when will be ready. Patient's wounds appears slightly improved. Left posterior leg wound remains open.    9/25/2024 : Clinic visit with ADILSON Arthur, HOLDEN, IMAN, LILIYA.   Patient states he is feeling well.  His wounds measure about the same.    10/2/2024: Clinic visit with HOLDEN Johnson CWON, LILIYA.  Pt denies fevers, chills, nausea, vomiting. Bilateral ischial ulcers increased in area.  Left IT quality overall worse compared to right IT.  Recommend Dakin's soak.  Continue with VAC to bilateral IT.    10/9/2024 : Clinic visit with ADILSON Arthur, HOLDEN, IMAN, LILIYA.   Patient continues to feel well overall.  His wounds measure about the same, no evidence of infection.   He does have some minor breakdown over the scar tissue of his sacrum which bears watching.  He has not heard from [employer] about his seat cushion, states he will contact them again.    10/16/2024 : Clinic visit with ADILSON Arthur, HOLDEN, IMAN, LILIYA.   Anjum continues to feel well.  His wounds measure slightly smaller.  Sacral wound just slightly open.  He called [employer] earlier this week, was told his new cushion is ready, and that they would deliver it next Monday.  He also states he is due for a new wheelchair, but has not yet become process.      10/23/2024 : Clinic visit with ADILSON Arthur, HOLDEN, IMAN, LILIYA.   Anjum's wounds present today with significantly more odor.  He states he is feeling fine, denies fevers, chills, nausea, vomiting, cough or shortness of breath.  Increased drainage noted.  Wounds measure about the same.  Culture collected in clinic today after debridement.  VAC placed on hold.  Wounds packed with Puracyn moistened silver Hydrofiber.   Patient reminded that he is to go to the emergency room if he notices any increased redness, swelling, drainage or odor, or if he develops fever, chills, nausea or vomiting.    He has a new cushion to his wheelchair.  States that he does not yet have a new wheelchair, will be picking on out the next few weeks.    10/30/2024 : Clinic visit with ADILSON Arthur, HOLDEN, IMAN, LILIYA.   Anjum states he is feeling well.  Wound odor still noticeable.  Culture collected last visit was positive for light growth of Proteus.  Given continued odor, will go ahead and prescribe short course of Augmentin, no other p.o. options available based on sensitivities.  Continue with Dakin's wound cleansing.  Home health to restart VAC on Friday 11/6/2024: Clinic visit with Steve Hodges MD. Patient reports doing well, denies any acute issues. Tolerating augmentin well without side effects. Odor much improved today. Wounds are improving slowly. Continue wound  VAC.    11/14/2024: Clinic visit with Steve Hodges MD. Patient reports doing ok. He was frustrated coming into clinic, was having trouble exiting his van. No evidence of wound infection today    11/20/2024: Clinic visit with Steve Hodges MD. Patient reports feeling in normal state of health. His ischial wounds stable and slowly improving. He has reopened sacral wound however. Denies any signs or symptoms of infection.    11/27/2024: Clinic visit with Steve Hodges MD. Patient reports doing well, denies any acute issues. Sacral wound measuring smaller. Right ischial wound smaller. Left ischial wound larger with increased slough and necrotic tissue at base of wound. Patient has contacted [employer] technician to evaluate seat due to reopening of sacrum, he is pending call back.    12/4/2024: Clinic visit with Steve Hodges MD. Patient reports doing well, denies any signs or symptoms of infection. Denies any issues with wound VAC. Sacrum has resolved. Right ischium wound larger with necrotic tissue, left ischium stable. He denies any traumatic events or any changes to his routine that would have increased pressure on right ischium besides time spent in chair during thanksgiving with family.    12/11/2024: Clinic visit with Steve Hodges MD. Patient reports doing well, denies any acute issues. Wounds are stable. No further necrosis of right ischium. Patient denies any signs or symptoms of infection.    12/18/2024: Clinic visit with Steve Hodges MD. Patient reports doing ok. Reports was diagnosed with UTI by urology, picking up Abx later today, thinks that he was prescribed Augmentin. Right ischial wound measuring larger, dusky tissue concerning for increased pressure. He reports that he has been up in wheelchair more this week with friend in town and has not been using tilt feature of chair as frequently. He was encouraged to spend more time offloading.   Due to holiday's, and dressings only going to be  changed 2x weekly, it is medically necessary to continue wound VAC for now as it would be severely problematic for patient to be sitting with saturated dressings during this period.    1/8/2025: Clinic visit with Steve Hodges MD. Patient reports chest congestion with low grade fevers for last few days. Denies any issues with wounds. He has increased necrosis of left IT pressure injury, he denies any changes in activity and is using tilt feature in chair every 30 min. Patient reports that he has appointment with [facility] next Friday to re-evaluate custom seat.    1/15/2025: Clinic visit with Steve Hodges MD. Patient returns to clinic following hospitalization for mycoplasma pneumonia.  Patient reports that he is feeling better denies any fevers or chills currently.  Patient has upcoming seating eval by [employer].  Wound VAC has been held since last week, wound seem to be slightly improved with holding VAC. Will continue to hold this this week.    1/22/2025: Clinic visit with Steve Hodges MD. Patient reports doing well, denies any acute issues. He had seat cushion re-evaluation and the technician noted that he had no hot spots when he was back far enough in chair, but if he slides forward then he is putting pressure on ischial tuberosities which explains the concern for pressure noted last few weeks. Patient will keep wheelchair slightly tilted back. Wounds with increased granulation tissue. Discontinue wound VAC and he will return to .    1/29/2025: Clinic visit with Steve Hodges MD. Patient reports doing ok, denies any signs or symptoms of infection. Patient assures me that he is sitting further back in chair as recommended by PT/wheel chair technician. Wounds are slowly improving, he reports less drainage. Will trial use of Hydrofiber instead of Enluxtra.    2/5/2025: Clinic visit with Steve Hodges MD. Patient reports doing well. Wounds are not overly macerated after holding Enluxtra,  continue hydrofiber.    2/12/2025: Clinic visit with Steve Hodges MD. Patient reports doing ok. Reports that Monday night his catheter was obstructed and caused leak saturating dressings. His group home changed and dried him, but dressings remained saturated. If something like this should occur again, patient will contact home health for dressing change.    2/19/2025 : Clinic visit with ADILSON Arthur, HOLDEN, IMAN, LILIYA.   Chart presents today saturated in urine due to leakage from his suprapubic catheter.  He states this is a frequent event due to bladder spasms.  He has seen his urologist several times, recently started getting Botox injections, does not feel this has helped.  I suggest that he consider getting a urostomy, would divert from bladder altogether, and if well created, would result in better containment of his urine.  He states he has an appointment with his urologist next month and will discuss with him.   His wounds continue to progress, but both measure smaller.    2/26/2025: Clinic visit with Steve Hodges MD. Patient reports doing well, denies any acute issues. Still having occasional leaking from catheter. Patient wounds measure about the same, however tissue quality has improved.     3/5/2025: Clinic visit with Steve Hodges MD. Patient reports doing well, denies any acute issues. Patients wounds are measuring smaller.  Right ischium wound is hypergranular.    3/12/2025 : Clinic visit with ADILSON Arthur, HOLDEN, IMAN, LILIYA.   States he is feeling well overall.  He is not having as much leakage from his suprapubic catheter.  However, he has an appointment with his urologist tomorrow, and will be discussing possible urostomy for better management of his urine.     His wounds continue to progress, significant improvement since modification to wheelchair cushion.    3/19/2025: Clinic visit with Steve Hodges MD. Patient reports doing well. He botox injections in bladder to  decrease leakage form catheter. He discussed urostomy with urologist, but they are going to try additional treatments before considering. Patient wounds continue to make slow improvement.    3/26/2025 : Clinic visit with ADILSON Arthur, DAT-BC, CWDINESHN, CFCN.   Anjum continues to feel well.  He has been having much less drainage from his suprapubic catheter, receiving Botox injections every 6 months.  His ischial wounds are steadily progressing.  Right ischial wound measures slightly larger due to excision of skin flap from distal wound edge.    4/2/2025: Clinic visit with Steve Hodges MD. Patient reports doing ok. He denies any signs or symptoms of infection. His right ischial wound with increased dusky appearance of tissue. He received new wheelchair which is same model as previous and is compatible with his custom cushion which he has been using. Assessed seat and chair today, appears to be satisfactory. Wounds slightly larger with mild increase in odor. Discussed using puracyn gel for additional antimicrobial coverage.    4/9/2025: Clinic visit with Steve Hodges MD. Patient reports doing well, he denies any signs or symptoms of infection. Wound smaller today. No leaks this week from hutchins.    REVIEW OF SYSTEMS:   Unchanged from previous wound clinic assessment on 4/2/2025, except as noted in interval history above.      PHYSICAL EXAMINATION:   There were no vitals taken for this visit.  Physical Exam  Constitutional:       Appearance: He is obese.   Cardiovascular:      Rate and Rhythm: Normal rate.   Pulmonary:      Effort: Pulmonary effort is normal.   Abdominal:      Comments: Colostomy left lower quadrant   Genitourinary:     Comments: Suprapubic catheter to down drain   Skin:     Comments: Stage IV pressure injury to right ischium: Wound slightly improved, no further dusky tissue. Thin layer of slough to wound bed.  Moderate serosanguineous drainage, mild odor. No periwound erythema or  induration    Stage IV pressure injury of left ischium: Wound slightly smaller. Thin layer of slough to wound bed. Moderate serosanguineous drainage with mild odor. No evidence of soft tissue infection    Stage IV pressure injury sacrum: Remains resolved    All other wounds remain healed, high risk for recurrence     Neurological:      Mental Status: He is alert and oriented to person, place, and time.   Psychiatric:         Mood and Affect: Mood normal.         WOUND ASSESSMENT  Wound 12/12/23 Pressure Injury Ischium Right (Active)   Wound Image    04/09/25 1624   Site Assessment Pink;Red;Yellow 04/09/25 1624   Periwound Assessment Maceration;Scar tissue 04/09/25 1624   Margins Unattached edges 04/09/25 1624   Closure Secondary intention 04/02/25 1559   Drainage Amount Large 04/09/25 1624   Drainage Description Serosanguineous 04/09/25 1624   Treatments Cleansed;Provider debridement;Site care 04/09/25 1624   Offloading/DME Other (comment) 04/09/25 1624   Wound Cleansing Hypochlorus Acid 04/09/25 1624   Periwound Protectant No-sting Skin Prep;Moisture Barrier 04/09/25 1624   Dressing Changed Changed 04/02/25 1559   Dressing Cleansing/Solutions Normal Saline 04/09/25 1624   Dressing Options Puracyn Gel;Hydrofera Blue Classic;Offloading Dressing - Sacral 04/09/25 1624   Dressing Change/Treatment Frequency Monday, Wednesday, Friday, and As Needed 04/09/25 1624   WOUND NURSE ONLY - Pressure Injury Stage 4 04/09/25 1624   Non-staged Wound Description Not applicable 04/02/25 1559   Wound Length (cm) 4 cm 04/09/25 1624   Wound Width (cm) 1.8 cm 04/09/25 1624   Wound Depth (cm) 0.3 cm 04/09/25 1624   Wound Surface Area (cm^2) 7.2 cm^2 04/09/25 1624   Wound Volume (cm^3) 2.16 cm^3 04/09/25 1624   Post-Procedure Length (cm) 4.1 cm 04/09/25 1624   Post-Procedure Width (cm) 1.8 cm 04/09/25 1624   Post-Procedure Depth (cm) 0.3 cm 04/09/25 1624   Post-Procedure Surface Area (cm^2) 7.38 cm^2 04/09/25 1624   Post-Procedure Volume  (cm^3) 2.214 cm^3 04/09/25 1624   Wound Healing % 96 04/09/25 1624   Tunneling (cm) 0 cm 04/02/25 1559   Tunneling Clock Position of Wound 6 03/26/25 1500   Undermining (cm) 2.5 cm 04/09/25 1624   Undermining of Wound, 1st Location From 6 o'clock;To 7 o'clock 04/09/25 1624   Undermining (cm) - 2nd location 0.5 cm 02/19/25 1500   Undermining of Wound, 2nd Location From 7 o'clock;From 12 o'clock;To 2 o'clock 02/19/25 1500   Wound Odor Mild 04/09/25 1624   Exposed Structures None 04/09/25 1624   Number of days: 486       Wound 05/01/24 Pressure Injury Ischium Left (Active)   Wound Image    04/09/25 1624   Site Assessment Pink;Red;Yellow 04/09/25 1624   Periwound Assessment Maceration;Scar tissue 04/09/25 1624   Margins Unattached edges 04/09/25 1624   Closure Secondary intention 04/02/25 1559   Drainage Amount Large 04/09/25 1624   Drainage Description Serosanguineous 04/09/25 1624   Treatments Cleansed;Provider debridement;Site care 04/09/25 1624   Offloading/DME Other (comment) 04/09/25 1624   Wound Cleansing Hypochlorus Acid 04/09/25 1624   Periwound Protectant No-sting Skin Prep;Moisture Barrier 04/09/25 1624   Dressing Changed Changed 04/02/25 1559   Dressing Cleansing/Solutions Normal Saline 04/09/25 1624   Dressing Options Puracyn Gel;Hydrofera Blue Classic;Offloading Dressing - Sacral 04/09/25 1624   Dressing Change/Treatment Frequency Monday, Wednesday, Friday, and As Needed 04/09/25 1624   Wound Team Following Weekly 12/18/24 1700   WOUND NURSE ONLY - Pressure Injury Stage 4 04/09/25 1624   Non-staged Wound Description Not applicable 04/02/25 1559   Wound Length (cm) 4 cm 04/09/25 1624   Wound Width (cm) 1.5 cm 04/09/25 1624   Wound Depth (cm) 1.5 cm 04/09/25 1624   Wound Surface Area (cm^2) 6 cm^2 04/09/25 1624   Wound Volume (cm^3) 9 cm^3 04/09/25 1624   Post-Procedure Length (cm) 4.1 cm 04/09/25 1624   Post-Procedure Width (cm) 1.5 cm 04/09/25 1624   Post-Procedure Depth (cm) 1.5 cm 04/09/25 1624  "  Post-Procedure Surface Area (cm^2) 6.15 cm^2 04/09/25 1624   Post-Procedure Volume (cm^3) 9.225 cm^3 04/09/25 1624   Wound Healing % -3991 04/09/25 1624   Tunneling (cm) 0 cm 04/09/25 1624   Undermining (cm) 0 cm 04/09/25 1624   Undermining of Wound, 1st Location From 4 o'clock;To 10 o'clock 12/18/24 1700   Wound Odor Mild 04/09/25 1624   Exposed Structures None 04/09/25 1624   Number of days: 345       PROCEDURE: Excisional debridement of right and left ischial wounds  -2% viscous lidocaine applied topically to wound bed for approximately 5 minutes prior to debridement  -Scissors and forceps used to excise small flap of skin over distal edge of right ischial wound.  -Curette then used to debride both wound beds.  Excisional debridement was performed to remove devitalized tissue until healthy, bleeding tissue was visualized.  Total area debrided was 13.53 cm², including into undermined areas of wounds.  Tissue debrided into the muscle / fascia layer.    -Bleeding controlled with manual pressure.  -Wound care completed by wound RN, refer to flowsheet  -Patient tolerated the procedure well, without c/o pain or discomfort.    Pertinent Labs and Diagnostics:    Labs:     A1c: No results found for: \"HBA1C\"     [facility] results, 7/1/2022 (under media tab)    CRP 13    ESR 31      IMAGING:     X-ray left tib-fib ordered 2/16/2024 through [facility]    12/11/2023-CT of abdomen pelvis with contrast  IMPRESSION:   1.  Right ischial decubitus ulcer extending to bone with soft tissue gas tracking along the right perineum. Appearance suggesting osteomyelitis, consider component of necrotizing fasciitis as clinically appropriate.  2.  Small pericardial effusion  3.  Left adrenal nodule, density on prior noncontrast CT demonstrates adenoma.  4.  Hepatomegaly  5.  Enlarged prostate, workup and evaluation for causes of prostate enlargement recommended as clinically appropriate.  6.  Atherosclerosis and atherosclerotic " coronary artery disease    12/15/2023-bone scan of left foot  IMPRESSION:     1.  Mild increased activity in the LEFT 1st and 3rd toes on blood pool and delayed images possibly indicating inflammation/infection.  2.  No significant blood flow asymmetry.          VASCULAR STUDIES: No results found.    LAST  WOUND CULTURE:   Lab Results   Component Value Date/Time    CULTRSULT  02/21/2025 03:00 PM     Mixed enteric ade ,000 cfu/mL  3 or more organisms isolated, culture of doubtful  significance, please recollect.        PATHOLOGY  2/17/2023-bone fragment extracted from left lower extremity wound\\  FINAL DIAGNOSIS:     A. Left leg bone fragment at base of chronic wound:          Extensively degenerated fibrocartilaginous tissue with a rim of           fibrinopurulent debris          Correlate with culture findings            Comment: While no residual intact bone is identified, these           findings are suggestive of adjacent osteomyelitis.      ASSESSMENT AND PLAN:     1. Sacral decubitus ulcer, stage IV ([facility])  Comments: Ulcer first noted in early April 2022 as small open area which quickly enlarged.  This ulcer is present distal from previous sacral ulcer which healed after surgery in January.  Patient has history of flap reconstruction x2 to this area.    4/9/2025: Wound remains resolved, though at high risk for reoccurrence  - Continue to protect area with pressure relieving sacral foam dressing.  -Patient does spend a lot of time up in his wheelchair, and wishes to continue to do so for his quality of life.  He lives independently, drives, and is involved with family activities.    -His has a new cushion in his wheelchair.  Has yet to pick out a new wheelchair  - Known OM that was previously treated. CT scan done during recent hospitalization did not show OM of sacrum, however OM of the ischium noted.  -Patient is very well versed in pressure relief strategies  -Monitor site each clinic visit    Wound  care: silicone adhesive foam dressing    2. Pressure injury of right ischium, stage 4 ([facility])  Comments: Abscess and OM found on CT during hospitalization in December 2023.  Patient underwent I&D with VAC placement.  IV antibiotics through 1/22/2024.    4/9/2025: Wound smaller today, no longer dusky or evidence of further pressure.  - Excisional debridement of nonviable tissue from wound in clinic today, medically necessary to promote wound healing  - VAC discontinued previously.  -Patient to return to clinic weekly for assessment, debridement, and dressing change.    -Home health to change dressing 2 times per week in between clinic visits.    -Patient is very well versed on pressure relief measures, has adequate surface on bed, alternating low air loss.    Wound care: Puracyn gel, Hydrofera Blue Classic, Hydrofiber, Silicone foam    3. Pressure injury of left ischium, stage 4 ([facility])    4/9/2025: Wound smaller today.  - Excisional debridement of wound in clinic today, medically necessary to promote wound healing.  - Patient to return to clinic weekly for assessment, debridement, and dressing change  -VAC has been discontinued  -Home health to change dressing 2 times per week in between clinic visits.    -Patient has new cushion in his wheelchair, see above  -Patient is very well versed on pressure relief measures, has adequate surface on bed, alternating low air loss.    Wound care: Puracyn Gel, Hydrofera Blue Classic, Hydrofiber, Silicone foam    4. Other acute osteomyelitis, other site ([facility])    4/9/2025: Bone fragments removed during clinic visit in June were sent for pathology, polymicrobial, most concerning was an MDR ESBL Proteus.   -Patient completed IV antibiotics.  No further antibiotics at this time per ID  -Patient understands he has a very low threshold for infection/sepsis.  He understands he is to go to the emergency room immediately if he begins to experience fevers, chills, nausea or vomiting, or if he  notices radiating erythema or purulent drainage from his wounds.    5. Quadriplegia, C5-C7 incomplete ([facility])  Comments: Complicating factor.  Impaired mobility and sensation  -Patient is still spending 7-8 hours/day up in his wheelchair and knows to reposition frequently.  Wears heel float boots bilaterally at all times  - Patient has new custom wheelchair seat. He had refitting and appears he was not sitting back in chair enough which was causing undue pressure to IT. He is more aware of the correct positioning in chair.    6.  Leakage from urinary catheter, subsequent encounter    4/9/2025:   - Denies any leaks currently  -Recently started Botox injections, feels it is finally starting to work  -He discussed ileal conduit with urologist. They will be pursuing other therapies before considering.      Please note that this note may have been created using voice recognition software. I have worked with technical experts from [employer] to optimize the interface.  I have made every reasonable attempt to correct obvious errors, but there may be errors of grammar and possibly content that I did not discover before finalizing the note.

## 2025-04-09 NOTE — PATIENT INSTRUCTIONS
-Keep dressings clean and dry. Change dressings every 3-4 days, and if they become over saturated, soiled or fall off.     -On the days you are not changing your dressings, avoid getting the dressings wet. It is not harmful to get your wound wet when you are washing your wound before applying a new dressing.    -If you need to change your dressings at home: Wash your wound with normal saline, wound cleanser, or unscented soap and water prior to applying your new dressings. Please avoid cleansing with hydrogen peroxide or rubbing alcohol. Hydrogen peroxide and rubbing alcohol are toxic to new tissue and skin cells.    -Should you experience any significant changes in your wound(s), such as signs of infection (increasing redness, swelling, localized heat, increased pain, fever > 101 F, chills) or have any questions regarding your home care instructions, please contact the wound center at [phone]. If after hours, contact your primary care physician or go to the hospital emergency room.     -If you are admitted to any hospital, you will need a new referral to come back to the wound clinic and any scheduled appointments that you already have, may be cancelled.     -If you are 5 or more minutes late for an appointment, we reserve the right to cancel and reschedule that appointment. Additionally, if you are habitually late or not showing (3 late cancellations and/or no shows), we reserve the right to cancel your remaining appointments and it will be your responsibility to obtain a new referral if services are still needed.

## 2025-04-12 ENCOUNTER — APPOINTMENT (OUTPATIENT)
Dept: RADIOLOGY | Facility: MEDICAL CENTER | Age: 75
End: 2025-04-12
Attending: EMERGENCY MEDICINE
Payer: MEDICARE

## 2025-04-12 ENCOUNTER — HOSPITAL ENCOUNTER (EMERGENCY)
Facility: MEDICAL CENTER | Age: 75
End: 2025-04-13
Attending: EMERGENCY MEDICINE
Payer: MEDICARE

## 2025-04-12 VITALS
SYSTOLIC BLOOD PRESSURE: 116 MMHG | OXYGEN SATURATION: 92 % | HEIGHT: 70 IN | BODY MASS INDEX: 30.78 KG/M2 | HEART RATE: 51 BPM | WEIGHT: 215 LBS | TEMPERATURE: 98.1 F | DIASTOLIC BLOOD PRESSURE: 57 MMHG | RESPIRATION RATE: 18 BRPM

## 2025-04-12 DIAGNOSIS — R50.9 FEBRILE ILLNESS: ICD-10-CM

## 2025-04-12 DIAGNOSIS — N30.00 ACUTE CYSTITIS WITHOUT HEMATURIA: ICD-10-CM

## 2025-04-12 LAB
ALBUMIN SERPL BCP-MCNC: 3.4 G/DL (ref 3.2–4.9)
ALBUMIN/GLOB SERPL: 0.9 G/DL
ALP SERPL-CCNC: 65 U/L (ref 30–99)
ALT SERPL-CCNC: 6 U/L (ref 2–50)
AMORPHOUS CRYSTALS 1764: PRESENT /HPF
ANION GAP SERPL CALC-SCNC: 9 MMOL/L (ref 7–16)
APPEARANCE UR: ABNORMAL
AST SERPL-CCNC: 9 U/L (ref 12–45)
BACTERIA #/AREA URNS HPF: ABNORMAL /HPF
BASOPHILS # BLD AUTO: 0.2 % (ref 0–1.8)
BASOPHILS # BLD: 0.02 K/UL (ref 0–0.12)
BILIRUB SERPL-MCNC: 0.5 MG/DL (ref 0.1–1.5)
BILIRUB UR QL STRIP.AUTO: NEGATIVE
BUN SERPL-MCNC: 24 MG/DL (ref 8–22)
CALCIUM ALBUM COR SERPL-MCNC: 9.5 MG/DL (ref 8.5–10.5)
CALCIUM SERPL-MCNC: 9 MG/DL (ref 8.5–10.5)
CASTS URNS QL MICRO: ABNORMAL /LPF (ref 0–2)
CHLORIDE SERPL-SCNC: 103 MMOL/L (ref 96–112)
CO2 SERPL-SCNC: 23 MMOL/L (ref 20–33)
COLOR UR: YELLOW
CREAT SERPL-MCNC: 0.57 MG/DL (ref 0.5–1.4)
EOSINOPHIL # BLD AUTO: 0.07 K/UL (ref 0–0.51)
EOSINOPHIL NFR BLD: 0.7 % (ref 0–6.9)
EPITHELIAL CELLS 1715: ABNORMAL /HPF (ref 0–5)
ERYTHROCYTE [DISTWIDTH] IN BLOOD BY AUTOMATED COUNT: 53.1 FL (ref 35.9–50)
GFR SERPLBLD CREATININE-BSD FMLA CKD-EPI: 102 ML/MIN/1.73 M 2
GLOBULIN SER CALC-MCNC: 3.7 G/DL (ref 1.9–3.5)
GLUCOSE SERPL-MCNC: 118 MG/DL (ref 65–99)
GLUCOSE UR STRIP.AUTO-MCNC: NEGATIVE MG/DL
HCT VFR BLD AUTO: 37.1 % (ref 42–52)
HGB BLD-MCNC: 12.4 G/DL (ref 14–18)
IMM GRANULOCYTES # BLD AUTO: 0.05 K/UL (ref 0–0.11)
IMM GRANULOCYTES NFR BLD AUTO: 0.5 % (ref 0–0.9)
KETONES UR STRIP.AUTO-MCNC: 15 MG/DL
LEUKOCYTE ESTERASE UR QL STRIP.AUTO: ABNORMAL
LYMPHOCYTES # BLD AUTO: 1.19 K/UL (ref 1–4.8)
LYMPHOCYTES NFR BLD: 12.5 % (ref 22–41)
MCH RBC QN AUTO: 34.2 PG (ref 27–33)
MCHC RBC AUTO-ENTMCNC: 33.4 G/DL (ref 32.3–36.5)
MCV RBC AUTO: 102.2 FL (ref 81.4–97.8)
MICRO URNS: ABNORMAL
MONOCYTES # BLD AUTO: 1.14 K/UL (ref 0–0.85)
MONOCYTES NFR BLD AUTO: 11.9 % (ref 0–13.4)
NEUTROPHILS # BLD AUTO: 7.08 K/UL (ref 1.82–7.42)
NEUTROPHILS NFR BLD: 74.2 % (ref 44–72)
NITRITE UR QL STRIP.AUTO: POSITIVE
NRBC # BLD AUTO: 0 K/UL
NRBC BLD-RTO: 0 /100 WBC (ref 0–0.2)
PH UR STRIP.AUTO: >=9 [PH] (ref 5–8)
PLATELET # BLD AUTO: 154 K/UL (ref 164–446)
PMV BLD AUTO: 8.8 FL (ref 9–12.9)
POTASSIUM SERPL-SCNC: 4 MMOL/L (ref 3.6–5.5)
PROT SERPL-MCNC: 7.1 G/DL (ref 6–8.2)
PROT UR QL STRIP: 30 MG/DL
RBC # BLD AUTO: 3.63 M/UL (ref 4.7–6.1)
RBC # URNS HPF: ABNORMAL /HPF
RBC UR QL AUTO: NEGATIVE
SODIUM SERPL-SCNC: 135 MMOL/L (ref 135–145)
SP GR UR STRIP.AUTO: 1.02
TRIPLE PHOS CRYSTAL  1767: PRESENT /HPF
UROBILINOGEN UR STRIP.AUTO-MCNC: 1 EU/DL
WBC # BLD AUTO: 9.6 K/UL (ref 4.8–10.8)
WBC #/AREA URNS HPF: ABNORMAL /HPF

## 2025-04-12 PROCEDURE — 71045 X-RAY EXAM CHEST 1 VIEW: CPT

## 2025-04-12 PROCEDURE — 87086 URINE CULTURE/COLONY COUNT: CPT

## 2025-04-12 PROCEDURE — 80053 COMPREHEN METABOLIC PANEL: CPT

## 2025-04-12 PROCEDURE — 87077 CULTURE AEROBIC IDENTIFY: CPT

## 2025-04-12 PROCEDURE — 700111 HCHG RX REV CODE 636 W/ 250 OVERRIDE (IP): Performed by: EMERGENCY MEDICINE

## 2025-04-12 PROCEDURE — 99284 EMERGENCY DEPT VISIT MOD MDM: CPT

## 2025-04-12 PROCEDURE — 700105 HCHG RX REV CODE 258: Performed by: EMERGENCY MEDICINE

## 2025-04-12 PROCEDURE — 81001 URINALYSIS AUTO W/SCOPE: CPT

## 2025-04-12 PROCEDURE — 36415 COLL VENOUS BLD VENIPUNCTURE: CPT

## 2025-04-12 PROCEDURE — 87186 SC STD MICRODIL/AGAR DIL: CPT

## 2025-04-12 PROCEDURE — 94760 N-INVAS EAR/PLS OXIMETRY 1: CPT

## 2025-04-12 PROCEDURE — 85025 COMPLETE CBC W/AUTO DIFF WBC: CPT

## 2025-04-12 RX ORDER — CEPHALEXIN 500 MG/1
500 CAPSULE ORAL 4 TIMES DAILY
Qty: 28 CAPSULE | Refills: 0 | Status: ACTIVE | OUTPATIENT
Start: 2025-04-12 | End: 2025-04-19

## 2025-04-12 RX ADMIN — CEFAZOLIN 2 G: 2 INJECTION, POWDER, FOR SOLUTION INTRAMUSCULAR; INTRAVENOUS at 23:47

## 2025-04-12 ASSESSMENT — FIBROSIS 4 INDEX: FIB4 SCORE: 2.34

## 2025-04-13 NOTE — DISCHARGE INSTRUCTIONS
We are sending off a urine culture as you have had certain infections that have been drug-resistant.  Your last one was sensitive to multiple antibiotics    The most important thing is that if you develop continued fever or if you are feeling worse we need you to come back.  At this point continue taking the antibiotics as directed.

## 2025-04-13 NOTE — ED NOTES
Pt. Verbalizes understanding of discharge instructions. accompanied to lobby. Pt. Alert/awake in NAD.  Medication teaching done
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
## 2025-04-13 NOTE — ED TRIAGE NOTES
"Chief Complaint   Patient presents with    Fever     Reports paralysis c5-7. Noted fever today 100.4. Denies cough/cold sx. With suprapubic catheter and asking for UA. Pt. Also seeing wound clinic for pressure sores to buttock. Pt. Reports these are healing well.      Physical Exam  Pulmonary:      Effort: Pulmonary effort is normal.   Skin:     General: Skin is warm and dry.   Neurological:      Mental Status: He is alert.       BP (!) 154/111   Pulse 86   Temp (!) 38.1 °C (100.5 °F) (Temporal)   Resp 18   Ht 1.778 m (5' 10\")   Wt 97.5 kg (215 lb)   SpO2 94%   BMI 30.85 kg/m²     "

## 2025-04-16 ENCOUNTER — TELEPHONE (OUTPATIENT)
Dept: INFECTIOUS DISEASES | Facility: MEDICAL CENTER | Age: 75
End: 2025-04-16
Payer: MEDICARE

## 2025-04-16 ENCOUNTER — OFFICE VISIT (OUTPATIENT)
Dept: WOUND CARE | Facility: MEDICAL CENTER | Age: 75
End: 2025-04-16
Payer: MEDICARE

## 2025-04-16 DIAGNOSIS — T83.038D: ICD-10-CM

## 2025-04-16 DIAGNOSIS — Z16.24 MULTIPLE DRUG RESISTANT ORGANISM (MDRO) CULTURE POSITIVE: ICD-10-CM

## 2025-04-16 DIAGNOSIS — L89.154 SACRAL DECUBITUS ULCER, STAGE IV (HCC): ICD-10-CM

## 2025-04-16 DIAGNOSIS — G82.54 QUADRIPLEGIA, C5-C7, INCOMPLETE (HCC): ICD-10-CM

## 2025-04-16 DIAGNOSIS — N39.0 URINARY TRACT INFECTION ASSOCIATED WITH CATHETERIZATION OF URINARY TRACT, UNSPECIFIED INDWELLING URINARY CATHETER TYPE, SUBSEQUENT ENCOUNTER: ICD-10-CM

## 2025-04-16 DIAGNOSIS — L89.314 PRESSURE INJURY OF RIGHT ISCHIUM, STAGE 4 (HCC): ICD-10-CM

## 2025-04-16 DIAGNOSIS — L89.324 PRESSURE INJURY OF LEFT ISCHIUM, STAGE 4 (HCC): ICD-10-CM

## 2025-04-16 DIAGNOSIS — T83.511D URINARY TRACT INFECTION ASSOCIATED WITH CATHETERIZATION OF URINARY TRACT, UNSPECIFIED INDWELLING URINARY CATHETER TYPE, SUBSEQUENT ENCOUNTER: ICD-10-CM

## 2025-04-16 DIAGNOSIS — N39.0 FREQUENT UTI: ICD-10-CM

## 2025-04-16 DIAGNOSIS — M86.18 OTHER ACUTE OSTEOMYELITIS, OTHER SITE (HCC): ICD-10-CM

## 2025-04-16 PROCEDURE — 11043 DBRDMT MUSC&/FSCA 1ST 20/<: CPT

## 2025-04-16 PROCEDURE — 11043 DBRDMT MUSC&/FSCA 1ST 20/<: CPT | Performed by: STUDENT IN AN ORGANIZED HEALTH CARE EDUCATION/TRAINING PROGRAM

## 2025-04-16 PROCEDURE — 11042 DBRDMT SUBQ TIS 1ST 20SQCM/<: CPT

## 2025-04-16 NOTE — TELEPHONE ENCOUNTER
IV Orders for Ertapenem faxed to [facility] and [facility], requested clinics call pt to discuss financials for home infusions. Pt to come to ID clinic after wound care visit to schedule follow up.

## 2025-04-16 NOTE — TELEPHONE ENCOUNTER
Caller Name: Anjum  Call Back Number: [phone]    How would the patient prefer to be contacted with a response: Phone call OK to leave a detailed message    Pt called requesting guidance on resistant UA, abx tx

## 2025-04-16 NOTE — TELEPHONE ENCOUNTER
Called and discussed patient's symptoms via telephone.  He was most recently in the ED on 4/12/25 with fever and increasing lower abdomen  pressure.  Urinalysis in ED positive for nitrates, leukocytes and WBCs.  Urine culture positive for Proteus mirabilis that is multidrug-resistant with no oral options available.  Discussed results with patient via telephone results he still having mild symptoms despite Keflex that the ED had ordered.  Recommend we start 10-day course of IV meropenem.  Patient would like to attempt home IV infusions first.  If cost is prohibiting treatment then he will proceed forward to [facility] outpatient infusion center.  Stat midline order placed.  Written orders for IV ertapenem 1 g daily, 10-day course, tentative start date 4/18/25, end date 4/28/2025.  Possibly change in date based upon start date of antibiotic therapy.  Medication education provided.  ED precautions discussed.  Follow-up with ID clinic in 1 week    ntains abnormal data URINE CULTURE(NEW)  Order: [id number]   Status: Final result       Next appt: Today at 03:30 PM in Wound Care (Steve Hodges M.D.)    Test Result Released: Yes (not seen)    Specimen Information: Urine, Suprapubic   0 Result Notes      Component  Ref Range & Units (hover) 4 d ago   Significant Indicator POS Positive (POS)   Source UR   Site -   Culture Result Usual skin ade 10-50,000 cfu/mL Abnormal    Culture Result  Abnormal   Proteus mirabilis  ,000 cfu/mL    Resulting Agency M        Susceptibility     Proteus mirabilis     ROSE     Amoxicillin/Clavulanic Acid >16/8 mcg/mL Resistant     Ampicillin >16 mcg/mL Resistant     Ampicillin/sulbactam >16/8 mcg/mL Resistant     Cefazolin >16 mcg/mL Resistant 1     Cefepime >16 mcg/mL Resistant     Ceftriaxone >32 mcg/mL Resistant     Cefuroxime >16 mcg/mL Resistant     Ciprofloxacin 2 mcg/mL Resistant     Gentamicin <=2 mcg/mL Sensitive     Levofloxacin 2 mcg/mL Resistant     Meropenem <=1 mcg/mL  Sensitive     Meropenem/Vaborbactam <=2 mcg/mL Sensitive     Minocycline >8 mcg/mL Resistant     Nitrofurantoin 64 mcg/mL Resistant     Pip/Tazobactam <=8 mcg/mL Sensitive     Tobramycin <=2 mcg/mL Sensitive     Trimeth/Sulfa >2/38 mcg/mL Resistant              1 Breakpoints when Cefazolin is used for therapy of infections  other than uncomplicated UTIs due to Enterobacterales are as  follows:  ROSE and Interpretation:  <=2 S  4 I  >=8 R            Specimen Collected: 04/12/25  9:45 PM Last Resulted: 04/15/25  8:39 A

## 2025-04-16 NOTE — PROGRESS NOTES
Provider Encounter- Pressure Injury        HISTORY OF PRESENT ILLNESS  Wound History:    START OF CARE IN CLINIC: 1/31/2024 (return to clinic after hospitalization)    REFERRING PROVIDER: Racquel York       WOUNDS-superior coccyx pressure injury, stage IV-resolved                                 Coccyx pressure injury, stage IV-resolved           Inferior coccyx pressure injury, stage IV resolved                                 Right ischial pressure injury, stage IV                                 Left heel pressure injury, recurring stage III-resolved                                 Left posterior lower leg/calf-stage III-resolved                                 Left medial lower leg surgical wound dehiscence-resolved, reopens frequently-resolved            Left ischial pressure injury, stage IV-first observed in clinic 5/1/2024          HISTORY: Patient with history of incomplete quadriplegia referred to [facility] for treatment of a stage IV pressure injury.  He has a history of previous pressure injuries to this area, and underwent muscle flaps in 2019, and then again in 2020.  He was seen in the wound clinic in November 2021 for an ulcer proximal from his current ulcer, and pressure injuries to his left posterior lower leg and left heel.  At that time, it was discovered that the patient had retained VAC foam embedded in the wound bed of the sacral wound.  Attempts were made to get him back to his plastic surgeon, though unsuccessful.  In January he underwent surgical removal of VAC sponge along with excisional debridement of his sacral wound by Dr. Chaves.  After the surgery, his wound went on to heal without incident.   In early April 2022, his home health nurse noted a new sacral ulcer, below the previous ulcer which quickly tripled in size over the following weeks.  The ulcer to his left medial lower leg had also deteriorated, with bone visible at the base..  He was hospitalized from 4/22 until 4/27/2022 and  underwent surgery with Dr. Ramna on 4/26 for irrigation and debridement of multiple compartments of the left lower extremity, bone excision, and complex closure of chronic wound using biologic skin substitute.   His sacrococcygeal wound was not surgically addressed during this admission.  He was discharged back to his group home, with home health, and referral to outpatient wound clinic for his sacral wound.  He was instructed to follow-up with his surgeon for his lower leg wound.       Postoperatively, the left medial lower leg incision dehisced.  He was seen by his surgeon at [facility] on 5/11.  The surgeon opted to leave remaining sutures in place, and refer him to the wound clinic for treatment of this wound.   Treatment of this wound was initiated in clinic on 5/12.  During this visit was also noted that his heel DTI had resolved, but that he had a new pressure injury to his left posterior lower extremity.     A new pressure injury was noted to patient's right upper buttock/lower back on 5/20/2022.  Wound was linear in shape, skin discolored but intact.     Abrasion noted to left anterior lower leg.  First observed in clinic on 7/22/2022.  Patient states he bumped his leg into his food tray.     Small DTI noted to patient's left lateral lower leg on 7/29/2022.  Skin intact but discolored.     Large area of deep tissue injury noted to patient's left exterior lower leg.  Patient denied any trauma to this area.  Skin intact.  Wound documented.    1/27/2023: Patient was admitted to [facility] [place] [place] from 1/23-1/25/2023 with gross hematuria. He underwent RICHARD which showed watchman device was in place and he was taken off of Xarelto. While hospitalized wound team was consulted. He was referred back to [facility] and home health upon discharge.    Patient was hospitalized at [facility] for pyelonephritis from 2/26 until 3/2/2023, admitted for fever and general malaise.  He was admitted and initially started on linezolid and meropenem for suspected  UTI and history of multidrug-resistant organisms.  Urine cultures were negative. ID was consulted, recommended CT of chest and abdomen,which were negative for acute findings. However, he was treated with 5 days total course of antibiotics for suspected UTI, and symptoms completely resolved.  During this admission, the inpatient wound team was consulted for treatment of his sacral and lower leg wounds.  A wound culture was taken from his left heel pressure ulcer, negative.  Once stabilized, he was discharged home and referred back to [facility] to resume treatment of his wounds.    Patient was hospitalized at [facility] from 12/11 until 12/23/2023, admitted for fever.  Wound infection suspected.  CT scan of abdomen and pelvis for evaluation of sacral pressure injury showed gas tracking down to the bone consistent with osteomyelitis.  He underwent I&D of right ischial ulcer (documented as buttock) with Dr. Bansal, medial tract leading to an abscess was identified.  Cavity was opened allowing it to drain into the main wound bed.  Wound VAC was placed and managed by wound team during this admission.  A bone scan of patient's left foot was also done, initially concerning for osteomyelitis.  Orthopedic surgery was consulted and did not recommend surgical intervention. ID consulted also, recommended the patient to receive IV ertapenem 1 g every 24 hours plus IV daptomycin 8 mg/kg every 24 hours through 1/22/2024.   He was discharged to [facility] on 1/23 for IV antibiotics and wound care.  From the LTAC he was discharged home on 1/22 with home health and referral back to [facility] to resume management of his wounds  .      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  Never used smokeless tobacco    Patient's problem list, allergies, and current medications reviewed and updated in Epic    Interval History:  Interval History thinned 7/29/2022.  Please see previous notes for complete interval history.   Interval History thinned 1/27/2023. Please see previous note for complete interval history.  Interval History thinned 3/3/2023.  Please see previous notes for complete interval history.    Interval History thinned 8/4/2023.  Please see previous notes for complete interval history.    Interval History thinned 1/31/2024.  Please see previous notes for complete interval history.    Interval History thinned 6/26/2024.  Please see previous notes for complete interval history.      6/19/2024: Clinic visit with Steve Hodges MD. Patient denies any fevers or chills. Reports he is tolerating Abx. He has appointment with ID later today to discuss OM treatment. He is tolerating wound VAC. Slight bone exposed bilaterally in ischial wounds, slightly worse.    6/26/2024 : Clinic visit with ADILSON Arthur, FNPEGGY-BC, CWDINESHN, CFTRACI.   Patient presents today with significant deterioration of his both ischial wounds, with increased depth of undermining.   He now has a PICC line, and will be starting outpatient infusions of ertapenem later today.  He states he is feeling well, denies fevers, chills, nausea, vomiting, cough or shortness of breath.  Due to deterioration of his wound, we did discuss possibly having him go over the hospital for surgical debridement and IV antibiotics.  He was hesitant to do so.  We agreed to see how he looks after a week or so IV antibiotics.  He is agreeable to minimizing time up in his wheelchair.  He also agrees to go to the emergency room immediately if he develops any signs or symptoms of infection.    7/10/2024: Clinic visit with Steve Hodges MD. Patient reports feeling in normal state of health. He missed  last appointment as he had fall out of wheelchair and was evaluated in ED. He denies any injuries from fall. Patient wounds are measuring slightly smaller. He continues on Ertapenem. Patient reports that he has appointment for seating evaluation from [facility] scheduled, but does not recall the date.    7/17/2024 : Clinic visit with ADILSON Arthur, HOLDEN, LILIYA VALERIO.   Anjum states he is feeling well.  Left ischial wound measures significantly smaller today, however measurements vary somewhat depending on pt's position.  Both wounds appear to be progressing slightly, with improving tissue quality.    7/24/2024 : Clinic visit with ADILSON Arthur, HOLDEN, IMAN, LILIYA.   Patient continues to feel well, offers no complaints.  Right ischial wound measures smaller, left ischial wound is larger.  Patient tolerating VAC without any difficulty.  Home health continues to see him in between clinic visits.  He is still receiving daily infusions of IV antibiotics, will be completing these in August.    7/31/2024 : Clinic visit with ADILSON Arthur, HOLDEN, IMAN, LILIYA.   Anjum continues to feel well, his wounds are slowly progressing.  Tolerating VAC.  He is on IV antibiotics for 1 more week.  Admits that he is up in his wheelchair for 10 to 14 hours/day.    8/7/2024 : Clinic visit with ADILSON Arthur FNP-BC, LILIYA VALERIO.   Anjum states he is feeling well today, offers no complaints.  Both wounds measure a bit smaller today, new granulation tissue noted.  He will be getting his last IV antibiotic infusion today.    8/14/2024 : Clinic visit with ADILSON Arthur FNP-BC, IMAN, LILIYA.   Patient continues to feel well.  He has completed his antibiotics, followed up with ID earlier this week, no further antibiotic therapy at this time.  Ischial wounds are slowly progressing.  Lower extremity wounds remain resolved.    8/21/2024 : Clinic visit with ADILSON Arthur FNP-BC, LILIYA VALERIO.   Anjum states he is feeling  well, offers no complaints.  His wounds are slowly progressing, increased granulation.    8/28/2024 : Clinic visit with ADILSON Arthur, HOLDEN, LILIYA VALERIO.   Turk states he is feeling well overall.  His wounds measure slightly smaller.  He has had some urinary symptoms, reports leakage from around his suprapubic catheter last night, and excessively full urine bag.  He has been in contact with his urologist office.  He also mentions that he has a recurring small scab to his nose, states that it falls off only to return shortly after.  This has been going on for several months.  I offered to refer him to dermatology.    9/11/2024 : Clinic visit with ADILSON Arthur, HOLDEN, LILIYA VALERIO.   Turk states he is feeling well.  He missed his appointment last week due to car troubles.  His vehicle is now running well.  Ischial wounds show some improvement, increased granulation tissue.  He does have a small reopening of left posterior lower leg wound, appears to be a small abrasion over area of scar tissue.    9/18/2024: Clinic visit with Steve Hodges MD. Patient reports doing ok. Denies any acute issues with wound VAC. Reports that he was fitted by [employer] for new seat cushion and is being manufactured, he is not sure when will be ready. Patient's wounds appears slightly improved. Left posterior leg wound remains open.    9/25/2024 : Clinic visit with ADILSON Arthur, HOLDEN, IMAN, LILIYA.   Patient states he is feeling well.  His wounds measure about the same.    10/2/2024: Clinic visit with HOLDEN Johnson CWON, LILIYA.  Pt denies fevers, chills, nausea, vomiting. Bilateral ischial ulcers increased in area.  Left IT quality overall worse compared to right IT.  Recommend Dakin's soak.  Continue with VAC to bilateral IT.    10/9/2024 : Clinic visit with ADILSON Arthur, HOLDEN, IMAN, LILIYA.   Patient continues to feel well overall.  His wounds measure about the same, no evidence of infection.   He does have some minor breakdown over the scar tissue of his sacrum which bears watching.  He has not heard from [employer] about his seat cushion, states he will contact them again.    10/16/2024 : Clinic visit with ADILSON Arthur, HOLDEN, IMAN, LILIYA.   Anjum continues to feel well.  His wounds measure slightly smaller.  Sacral wound just slightly open.  He called [employer] earlier this week, was told his new cushion is ready, and that they would deliver it next Monday.  He also states he is due for a new wheelchair, but has not yet become process.      10/23/2024 : Clinic visit with ADILSON Arthur, HOLDEN, IMAN, LILIYA.   Anjum's wounds present today with significantly more odor.  He states he is feeling fine, denies fevers, chills, nausea, vomiting, cough or shortness of breath.  Increased drainage noted.  Wounds measure about the same.  Culture collected in clinic today after debridement.  VAC placed on hold.  Wounds packed with Puracyn moistened silver Hydrofiber.   Patient reminded that he is to go to the emergency room if he notices any increased redness, swelling, drainage or odor, or if he develops fever, chills, nausea or vomiting.    He has a new cushion to his wheelchair.  States that he does not yet have a new wheelchair, will be picking on out the next few weeks.    10/30/2024 : Clinic visit with ADILSON Arthur, HOLDEN, IMAN, LILIYA.   Anjum states he is feeling well.  Wound odor still noticeable.  Culture collected last visit was positive for light growth of Proteus.  Given continued odor, will go ahead and prescribe short course of Augmentin, no other p.o. options available based on sensitivities.  Continue with Dakin's wound cleansing.  Home health to restart VAC on Friday 11/6/2024: Clinic visit with Stvee Hodges MD. Patient reports doing well, denies any acute issues. Tolerating augmentin well without side effects. Odor much improved today. Wounds are improving slowly. Continue wound  VAC.    11/14/2024: Clinic visit with Steve Hodges MD. Patient reports doing ok. He was frustrated coming into clinic, was having trouble exiting his van. No evidence of wound infection today    11/20/2024: Clinic visit with Steve Hodges MD. Patient reports feeling in normal state of health. His ischial wounds stable and slowly improving. He has reopened sacral wound however. Denies any signs or symptoms of infection.    11/27/2024: Clinic visit with Steve Hodges MD. Patient reports doing well, denies any acute issues. Sacral wound measuring smaller. Right ischial wound smaller. Left ischial wound larger with increased slough and necrotic tissue at base of wound. Patient has contacted [employer] technician to evaluate seat due to reopening of sacrum, he is pending call back.    12/4/2024: Clinic visit with Steve Hodges MD. Patient reports doing well, denies any signs or symptoms of infection. Denies any issues with wound VAC. Sacrum has resolved. Right ischium wound larger with necrotic tissue, left ischium stable. He denies any traumatic events or any changes to his routine that would have increased pressure on right ischium besides time spent in chair during thanksgiving with family.    12/11/2024: Clinic visit with Steve Hodges MD. Patient reports doing well, denies any acute issues. Wounds are stable. No further necrosis of right ischium. Patient denies any signs or symptoms of infection.    12/18/2024: Clinic visit with Steve Hodges MD. Patient reports doing ok. Reports was diagnosed with UTI by urology, picking up Abx later today, thinks that he was prescribed Augmentin. Right ischial wound measuring larger, dusky tissue concerning for increased pressure. He reports that he has been up in wheelchair more this week with friend in town and has not been using tilt feature of chair as frequently. He was encouraged to spend more time offloading.   Due to holiday's, and dressings only going to be  changed 2x weekly, it is medically necessary to continue wound VAC for now as it would be severely problematic for patient to be sitting with saturated dressings during this period.    1/8/2025: Clinic visit with Steve Hodges MD. Patient reports chest congestion with low grade fevers for last few days. Denies any issues with wounds. He has increased necrosis of left IT pressure injury, he denies any changes in activity and is using tilt feature in chair every 30 min. Patient reports that he has appointment with [facility] next Friday to re-evaluate custom seat.    1/15/2025: Clinic visit with Steve Hodges MD. Patient returns to clinic following hospitalization for mycoplasma pneumonia.  Patient reports that he is feeling better denies any fevers or chills currently.  Patient has upcoming seating eval by [employer].  Wound VAC has been held since last week, wound seem to be slightly improved with holding VAC. Will continue to hold this this week.    1/22/2025: Clinic visit with Steve Hodges MD. Patient reports doing well, denies any acute issues. He had seat cushion re-evaluation and the technician noted that he had no hot spots when he was back far enough in chair, but if he slides forward then he is putting pressure on ischial tuberosities which explains the concern for pressure noted last few weeks. Patient will keep wheelchair slightly tilted back. Wounds with increased granulation tissue. Discontinue wound VAC and he will return to .    1/29/2025: Clinic visit with Steve Hodges MD. Patient reports doing ok, denies any signs or symptoms of infection. Patient assures me that he is sitting further back in chair as recommended by PT/wheel chair technician. Wounds are slowly improving, he reports less drainage. Will trial use of Hydrofiber instead of Enluxtra.    2/5/2025: Clinic visit with Steve Hodges MD. Patient reports doing well. Wounds are not overly macerated after holding Enluxtra,  continue hydrofiber.    2/12/2025: Clinic visit with Steve Hodges MD. Patient reports doing ok. Reports that Monday night his catheter was obstructed and caused leak saturating dressings. His group home changed and dried him, but dressings remained saturated. If something like this should occur again, patient will contact home health for dressing change.    2/19/2025 : Clinic visit with ADILSON Arthur, HOLDEN, IMAN, LILIYA.   Chart presents today saturated in urine due to leakage from his suprapubic catheter.  He states this is a frequent event due to bladder spasms.  He has seen his urologist several times, recently started getting Botox injections, does not feel this has helped.  I suggest that he consider getting a urostomy, would divert from bladder altogether, and if well created, would result in better containment of his urine.  He states he has an appointment with his urologist next month and will discuss with him.   His wounds continue to progress, but both measure smaller.    2/26/2025: Clinic visit with Steve Hodges MD. Patient reports doing well, denies any acute issues. Still having occasional leaking from catheter. Patient wounds measure about the same, however tissue quality has improved.     3/5/2025: Clinic visit with Steve Hodges MD. Patient reports doing well, denies any acute issues. Patients wounds are measuring smaller.  Right ischium wound is hypergranular.    3/12/2025 : Clinic visit with ADILSON Arthur, HOLDEN, IMAN, LILIYA.   States he is feeling well overall.  He is not having as much leakage from his suprapubic catheter.  However, he has an appointment with his urologist tomorrow, and will be discussing possible urostomy for better management of his urine.     His wounds continue to progress, significant improvement since modification to wheelchair cushion.    3/19/2025: Clinic visit with Steve oHdges MD. Patient reports doing well. He botox injections in bladder to  decrease leakage form catheter. He discussed urostomy with urologist, but they are going to try additional treatments before considering. Patient wounds continue to make slow improvement.    3/26/2025 : Clinic visit with ADILSON Arthur, DAT-BC, ALEXN, CFTRACI.   Anjum continues to feel well.  He has been having much less drainage from his suprapubic catheter, receiving Botox injections every 6 months.  His ischial wounds are steadily progressing.  Right ischial wound measures slightly larger due to excision of skin flap from distal wound edge.    4/2/2025: Clinic visit with Steve Hodges MD. Patient reports doing ok. He denies any signs or symptoms of infection. His right ischial wound with increased dusky appearance of tissue. He received new wheelchair which is same model as previous and is compatible with his custom cushion which he has been using. Assessed seat and chair today, appears to be satisfactory. Wounds slightly larger with mild increase in odor. Discussed using puracyn gel for additional antimicrobial coverage.    4/9/2025: Clinic visit with Steve Hodges MD. Patient reports doing well, he denies any signs or symptoms of infection. Wound smaller today. No leaks this week from hutchins.    4/16/2025: Clinic visit with Steve Hodges MD. Patient was seen in ED on Saturday for low grade fever, found to have UTI. He is following with ID for proteus infection and will be starting IV ertapenem. Patient reports increased leakage from superpubic which was exchanged in ED. His right ischial wound larger. Continues to have heavy drainage, recommend discontinuing puracyn gel and will try to add layer of enluxtra with backing removed to see if will manage drainage better.    REVIEW OF SYSTEMS:   Unchanged from previous wound clinic assessment on 4/9/2025, except as noted in interval history above.      PHYSICAL EXAMINATION:   There were no vitals taken for this visit.  Physical Exam  Constitutional:        Appearance: He is obese.   Cardiovascular:      Rate and Rhythm: Normal rate.   Pulmonary:      Effort: Pulmonary effort is normal.   Abdominal:      Comments: Colostomy left lower quadrant   Genitourinary:     Comments: Suprapubic catheter to down drain   Skin:     Comments: Stage IV pressure injury to right ischium: Wound larger today, heavy drainage. Thin layer of slough to wound bed.  No odor. No periwound erythema or induration    Stage IV pressure injury of left ischium: Wound stable. Thin layer of slough to wound bed. Heavy serosanguineous drainage with mild odor. No evidence of soft tissue infection    Stage IV pressure injury sacrum: Remains resolved    All other wounds remain healed, high risk for recurrence     Neurological:      Mental Status: He is alert and oriented to person, place, and time.   Psychiatric:         Mood and Affect: Mood normal.         WOUND ASSESSMENT  Wound 12/12/23 Pressure Injury Ischium Right (Active)   Wound Image   04/16/25 1700   Site Assessment Red;Pink;Yellow 04/16/25 1700   Periwound Assessment Maceration;Scar tissue 04/16/25 1700   Margins Unattached edges 04/16/25 1700   Closure Secondary intention 04/02/25 1559   Drainage Amount Large 04/16/25 1700   Drainage Description Serosanguineous 04/16/25 1700   Treatments Cleansed;Provider debridement 04/16/25 1700   Offloading/DME Other (comment) 04/09/25 1624   Wound Cleansing Hypochlorus Acid 04/16/25 1700   Periwound Protectant Skin Protectant Wipes to Periwound;Moisture Barrier 04/16/25 1700   Dressing Changed Changed 04/02/25 1559   Dressing Cleansing/Solutions Normal Saline 04/16/25 1700   Dressing Options Hydrofera Blue Ready;Other (Comments) 04/16/25 1700   Dressing Change/Treatment Frequency Monday, Wednesday, Friday, and As Needed 04/16/25 1700   WOUND NURSE ONLY - Pressure Injury Stage 4 04/16/25 1700   Non-staged Wound Description Not applicable 04/02/25 1559   Wound Length (cm) 6.5 cm 04/16/25 1700   Wound Width  (cm) 1.2 cm 04/16/25 1700   Wound Depth (cm) 0.5 cm 04/16/25 1700   Wound Surface Area (cm^2) 7.8 cm^2 04/16/25 1700   Wound Volume (cm^3) 3.9 cm^3 04/16/25 1700   Post-Procedure Length (cm) 6.5 cm 04/16/25 1700   Post-Procedure Width (cm) 1.2 cm 04/16/25 1700   Post-Procedure Depth (cm) 0.5 cm 04/16/25 1700   Post-Procedure Surface Area (cm^2) 7.8 cm^2 04/16/25 1700   Post-Procedure Volume (cm^3) 3.9 cm^3 04/16/25 1700   Wound Healing % 93 04/16/25 1700   Tunneling (cm) 0 cm 04/02/25 1559   Tunneling Clock Position of Wound 6 03/26/25 1500   Undermining (cm) 2 cm 04/16/25 1700   Undermining of Wound, 1st Location From 6 o'clock;To 7 o'clock 04/16/25 1700   Undermining (cm) - 2nd location 0.5 cm 02/19/25 1500   Undermining of Wound, 2nd Location From 7 o'clock;From 12 o'clock;To 2 o'clock 02/19/25 1500   Wound Odor Mild 04/16/25 1700   Exposed Structures None 04/16/25 1700   Number of days: 492       Wound 05/01/24 Pressure Injury Ischium Left (Active)   Wound Image    04/16/25 1700   Site Assessment Pink;Red;Yellow 04/16/25 1700   Periwound Assessment Maceration;Scar tissue 04/16/25 1700   Margins Unattached edges 04/16/25 1700   Closure Secondary intention 04/02/25 1559   Drainage Amount Large 04/16/25 1700   Drainage Description Serosanguineous 04/16/25 1700   Treatments Cleansed;Provider debridement 04/16/25 1700   Offloading/DME Other (comment) 04/09/25 1624   Wound Cleansing Hypochlorus Acid 04/16/25 1700   Periwound Protectant No-sting Skin Prep;Moisture Barrier 04/16/25 1700   Dressing Changed Changed 04/02/25 1559   Dressing Cleansing/Solutions Not Applicable;Normal Saline 04/16/25 1700   Dressing Options Hydrofera Blue Classic;Other (Comments);Offloading Dressing - Sacral 04/16/25 1700   Dressing Change/Treatment Frequency Monday, Wednesday, Friday, and As Needed 04/16/25 1700   Wound Team Following Weekly 12/18/24 1700   WOUND NURSE ONLY - Pressure Injury Stage 4 04/16/25 1700   Non-staged Wound  "Description Not applicable 04/02/25 1559   Wound Length (cm) 4.2 cm 04/16/25 1700   Wound Width (cm) 1.5 cm 04/16/25 1700   Wound Depth (cm) 1.7 cm 04/16/25 1700   Wound Surface Area (cm^2) 6.3 cm^2 04/16/25 1700   Wound Volume (cm^3) 10.71 cm^3 04/16/25 1700   Post-Procedure Length (cm) 4.2 cm 04/16/25 1700   Post-Procedure Width (cm) 1.6 cm 04/16/25 1700   Post-Procedure Depth (cm) 1.7 cm 04/16/25 1700   Post-Procedure Surface Area (cm^2) 6.72 cm^2 04/16/25 1700   Post-Procedure Volume (cm^3) 11.424 cm^3 04/16/25 1700   Wound Healing % -4768 04/16/25 1700   Tunneling (cm) 0 cm 04/16/25 1700   Undermining (cm) 0 cm 04/16/25 1700   Undermining of Wound, 1st Location From 4 o'clock;To 10 o'clock 12/18/24 1700   Wound Odor Strong;Foul 04/16/25 1700   Exposed Structures None 04/16/25 1700   Number of days: 351       PROCEDURE: Excisional debridement of right and left ischial wounds  -2% viscous lidocaine applied topically to wound bed for approximately 5 minutes prior to debridement  -Scissors and forceps used to excise small flap of skin over distal edge of right ischial wound.  -Curette then used to debride both wound beds.  Excisional debridement was performed to remove devitalized tissue until healthy, bleeding tissue was visualized.  Total area debrided was 14.52 cm², including into undermined areas of wounds.  Tissue debrided into the muscle / fascia layer.    -Bleeding controlled with manual pressure.  -Wound care completed by wound RN, refer to flowsheet  -Patient tolerated the procedure well, without c/o pain or discomfort.    Pertinent Labs and Diagnostics:    Labs:     A1c: No results found for: \"HBA1C\"     [facility] results, 7/1/2022 (under media tab)    CRP 13    ESR 31      IMAGING:     X-ray left tib-fib ordered 2/16/2024 through [facility]    12/11/2023-CT of abdomen pelvis with contrast  IMPRESSION:   1.  Right ischial decubitus ulcer extending to bone with soft tissue gas tracking along the " right perineum. Appearance suggesting osteomyelitis, consider component of necrotizing fasciitis as clinically appropriate.  2.  Small pericardial effusion  3.  Left adrenal nodule, density on prior noncontrast CT demonstrates adenoma.  4.  Hepatomegaly  5.  Enlarged prostate, workup and evaluation for causes of prostate enlargement recommended as clinically appropriate.  6.  Atherosclerosis and atherosclerotic coronary artery disease    12/15/2023-bone scan of left foot  IMPRESSION:     1.  Mild increased activity in the LEFT 1st and 3rd toes on blood pool and delayed images possibly indicating inflammation/infection.  2.  No significant blood flow asymmetry.          VASCULAR STUDIES: No results found.    LAST  WOUND CULTURE:   Lab Results   Component Value Date/Time    CULTRSULT Usual skin ade 10-50,000 cfu/mL (A) 04/12/2025 09:45 PM    CULTRSULT Proteus mirabilis  ,000 cfu/mL   (A) 04/12/2025 09:45 PM      PATHOLOGY  2/17/2023-bone fragment extracted from left lower extremity wound\\  FINAL DIAGNOSIS:     A. Left leg bone fragment at base of chronic wound:          Extensively degenerated fibrocartilaginous tissue with a rim of           fibrinopurulent debris          Correlate with culture findings            Comment: While no residual intact bone is identified, these           findings are suggestive of adjacent osteomyelitis.      ASSESSMENT AND PLAN:     1. Sacral decubitus ulcer, stage IV (HCC)  Comments: Ulcer first noted in early April 2022 as small open area which quickly enlarged.  This ulcer is present distal from previous sacral ulcer which healed after surgery in January.  Patient has history of flap reconstruction x2 to this area.    4/16/2025: Wound remains resolved, though at high risk for reoccurrence  - Continue to protect area with pressure relieving sacral foam dressing.  -Patient does spend a lot of time up in his wheelchair, and wishes to continue to do so for his quality of life.   He lives independently, drives, and is involved with family activities.    -His has a new cushion in his wheelchair.  Has yet to pick out a new wheelchair  - Known OM that was previously treated. CT scan done during recent hospitalization did not show OM of sacrum, however OM of the ischium noted.  -Patient is very well versed in pressure relief strategies  -Monitor site each clinic visit    Wound care: silicone adhesive foam dressing    2. Pressure injury of right ischium, stage 4 ([facility])  Comments: Abscess and OM found on CT during hospitalization in December 2023.  Patient underwent I&D with VAC placement.  IV antibiotics through 1/22/2024.    4/16/2025: Wound larger today, increased undermining. Heavy drainage.  - Excisional debridement of nonviable tissue from wound in clinic today, medically necessary to promote wound healing  - VAC discontinued previously.  -Patient to return to clinic weekly for assessment, debridement, and dressing change.    -Home health to change dressing 2 times per week in between clinic visits.    -Patient is very well versed on pressure relief measures, has adequate surface on bed, alternating low air loss.  - Will trial use of enluxtra with backing removed for better drainage control.    Wound care: Hydrofera Blue Classic, enluxtra, Silicone foam    3. Pressure injury of left ischium, stage 4 ([facility])    4/16/2025: Wound stable  - Excisional debridement of wound in clinic today, medically necessary to promote wound healing.  - Patient to return to clinic weekly for assessment, debridement, and dressing change  -VAC has been discontinued  -Home health to change dressing 2 times per week in between clinic visits.    -Patient has new cushion in his wheelchair, see above  -Patient is very well versed on pressure relief measures, has adequate surface on bed, alternating low air loss.    Wound care: Hydrofera Blue Classic, enluxtra, Silicone foam    4. Other acute osteomyelitis, other site  ([facility])    4/16/2025: Bone fragments removed during clinic visit in June were sent for pathology, polymicrobial, most concerning was an MDR ESBL Proteus.   -Patient completed IV antibiotics.  No further antibiotics at this time per ID  -Patient understands he has a very low threshold for infection/sepsis.  He understands he is to go to the emergency room immediately if he begins to experience fevers, chills, nausea or vomiting, or if he notices radiating erythema or purulent drainage from his wounds.    5. Quadriplegia, C5-C7 incomplete ([facility])  Comments: Complicating factor.  Impaired mobility and sensation  -Patient is still spending 7-8 hours/day up in his wheelchair and knows to reposition frequently.  Wears heel float boots bilaterally at all times  - Patient has new custom wheelchair seat. He had refitting and appears he was not sitting back in chair enough which was causing undue pressure to IT. He is more aware of the correct positioning in chair.    6.  Leakage from urinary catheter, subsequent encounter    4/16/2025:   - Repeated leakage  - Following with urology, performing Botox injections  -He discussed ileal conduit with urologist. They will be pursuing other therapies before considering.  - recently Dx with UTI, will be starting Ertapenem    My total time spent caring for the patient on the day of the encounter was 30 minutes, reviewing history, assessment, counseling and education, and coordination of care including discussion with ID APRN.  This does not include time spent on separately billable procedures/tests.        Please note that this note may have been created using voice recognition software. I have worked with technical experts from [employer] to optimize the interface.  I have made every reasonable attempt to correct obvious errors, but there may be errors of grammar and possibly content that I did not discover before finalizing the note.

## 2025-04-16 NOTE — ED NOTES
ED Positive Culture Follow-up/Notification Note:   Date: 4/16/2025    Patient with history of paraplegia with chronic suprapubic catheter seen in the ED on 4/12/2025 for low-grade fever, requesting urinary analysis. UA was positive for nitrites, LE, and few bacteria. He was discharged on Keflex and urine cultures obtained. Cultures now positive for multi-drug resistant Proteus mirabilis.    Discussed with patient over the phone. Fevers rapidly resolved. Urine has cleared, patient is feeling well. Patient had also discussed with ADILSON Simon, with [facility], and had described some symptoms. Discussed with provider, prefer to treat at this time, options are 3 dose fosfomycin course or course of ertapenem with midline placement.      1. Acute cystitis without hematuria    2. Febrile illness      Discharge Medication List as of 4/13/2025 12:11 AM        START taking these medications    Details   cephALEXin (KEFLEX) 500 MG Cap Take 1 Capsule by mouth 4 times a day for 7 days., Disp-28 Capsule, R-0, Normal           Allergies: Sulfa drugs    Vitals:    04/12/25 2030 04/12/25 2100 04/12/25 2200 04/12/25 2230   BP: 99/55 116/57     Pulse: (!) 59 (!) 58 63 (!) 51   Resp: (!) 21 (!) 22 (!) 24 18   Temp:    36.7 °C (98.1 °F)   TempSrc:    Temporal   SpO2: 93% 91% 92% 92%   Weight:       Height:           Final cultures:   Results       Procedure Component Value Units Date/Time    URINE CULTURE(NEW) [[id number]]  (Abnormal)  (Susceptibility) Collected: 04/12/25 2145    Order Status: Completed Specimen: Urine, Suprapubic Updated: 04/15/25 0839     Significant Indicator POS     Source UR     Site -     Culture Result Usual skin ade 10-50,000 cfu/mL      Proteus mirabilis  ,000 cfu/mL      Susceptibility       Proteus mirabilis (1)       Antibiotic Interpretation Microscan   Method Status    Ampicillin/sulbactam Resistant >16/8 mcg/mL ROSE Final    Amikacin  [*]  Sensitive <=16 mcg/mL ROSE Final    Ampicillin Resistant  >16 mcg/mL ROSE Final    Amoxicillin/Clavulanic Acid Resistant >16/8 mcg/mL ROSE Final    Aztreonam  [*]  Sensitive <=4 mcg/mL ROSE Final    Ceftolozane+Tazobactam  [*]  Sensitive <=2 mcg/mL ROSE Final    Ceftriaxone Resistant >32 mcg/mL ROSE Final    Ceftazidime  [*]  Sensitive 4 mcg/mL ROSE Final    Cefazolin Resistant >16 mcg/mL ROSE Final     Breakpoints when Cefazolin is used for therapy of infections  other than uncomplicated UTIs due to Enterobacterales are as  follows:  ROSE and Interpretation:  <=2 S  4 I  >=8 R         Ciprofloxacin Resistant 2 mcg/mL ROSE Final    Cefepime Resistant >16 mcg/mL ROSE Final    Cefuroxime Resistant >16 mcg/mL ROSE Final    Ertapenem  [*]  Sensitive <=0.5 mcg/mL ROSE Final    Nitrofurantoin Resistant 64 mcg/mL ROSE Final    Gentamicin Sensitive <=2 mcg/mL ROSE Final    Levofloxacin Resistant 2 mcg/mL ROSE Final    Meropenem Sensitive <=1 mcg/mL ROSE Final    Meropenem/Vaborbactam Sensitive <=2 mcg/mL ROSE Final    Minocycline Resistant >8 mcg/mL ROSE Final    Pip/Tazobactam Sensitive <=8 mcg/mL ROSE Final    Trimeth/Sulfa Resistant >2/38 mcg/mL ROSE Final    Tetracycline  [*]  Resistant >8 mcg/mL ROSE Final    Tobramycin Sensitive <=2 mcg/mL ROSE Final               [*]  Suppressed Antibiotic                   URINALYSIS (UA) [[id number]]  (Abnormal) Collected: 04/12/25 2145    Order Status: Completed Specimen: Urine Updated: 04/12/25 2246     Color Yellow     Character Turbid     Specific Gravity 1.023     Ph >=9.0     Glucose Negative mg/dL      Ketones 15 mg/dL      Protein 30 mg/dL      Bilirubin Negative     Urobilinogen, Urine 1.0 EU/dL      Nitrite Positive     Leukocyte Esterase Large     Occult Blood Negative     Micro Urine Req Microscopic          Plan:   Isolated organism is resistant to prescribed therapy. Defer treatment decision to [facility]UYET Gilmore, Edie

## 2025-04-17 PROBLEM — T83.510A URINARY TRACT INFECTION ASSOCIATED WITH CYSTOSTOMY CATHETER (HCC): Status: ACTIVE | Noted: 2021-12-15

## 2025-04-17 RX ORDER — 0.9 % SODIUM CHLORIDE 0.9 %
10 VIAL (ML) INJECTION PRN
Status: CANCELLED | OUTPATIENT
Start: 2025-04-18

## 2025-04-17 RX ORDER — 0.9 % SODIUM CHLORIDE 0.9 %
3 VIAL (ML) INJECTION PRN
Status: CANCELLED | OUTPATIENT
Start: 2025-04-18

## 2025-04-17 RX ORDER — 0.9 % SODIUM CHLORIDE 0.9 %
VIAL (ML) INJECTION PRN
Status: CANCELLED | OUTPATIENT
Start: 2025-04-18

## 2025-04-17 NOTE — PROGRESS NOTES
Patient unable to afford outpatient infusions.  Orders placed for patient to go to [facility] outpatient infusion center for once a day ertapenem 1 g.  10-day course with a tentative start date tomorrow on 4/18/2025 but possibly may change based upon outpatient therapy schedule.  Pending midline placement

## 2025-04-17 NOTE — PATIENT INSTRUCTIONS
-Keep your wound dressing clean, dry, and intact. Only change dressing if it's over saturated, soiled or falls off.     -Change your dressing if it becomes soiled, soaked, or falls off.  --Should you experience any significant changes in your wound(s), such as infection (redness, swelling, localized heat, increased pain, fever > 101 F, chills) or have any questions regarding your home care instructions, please contact the wound center at [phone]. If after hours, contact your primary care physician or go to the hospital emergency room.     If you are admitted to any hospital, you will need a new referral to come back to the wound clinic and any scheduled appointments that you already have, may be cancelled.  
Female

## 2025-04-17 NOTE — PROGRESS NOTES
Updated wound care orders to include Enluxtra dressing sent via [facility] to [facility].

## 2025-04-19 ENCOUNTER — HOSPITAL ENCOUNTER (OUTPATIENT)
Dept: RADIOLOGY | Facility: MEDICAL CENTER | Age: 75
End: 2025-04-19
Attending: NURSE PRACTITIONER
Payer: MEDICARE

## 2025-04-19 ENCOUNTER — OUTPATIENT INFUSION SERVICES (OUTPATIENT)
Dept: ONCOLOGY | Facility: MEDICAL CENTER | Age: 75
End: 2025-04-19
Attending: NURSE PRACTITIONER
Payer: MEDICARE

## 2025-04-19 VITALS
BODY MASS INDEX: 62.21 KG/M2 | TEMPERATURE: 98.4 F | DIASTOLIC BLOOD PRESSURE: 68 MMHG | WEIGHT: 315 LBS | RESPIRATION RATE: 18 BRPM | OXYGEN SATURATION: 97 % | HEART RATE: 55 BPM | SYSTOLIC BLOOD PRESSURE: 132 MMHG

## 2025-04-19 DIAGNOSIS — N39.0 RECURRENT UTI (URINARY TRACT INFECTION): ICD-10-CM

## 2025-04-19 DIAGNOSIS — T83.511D URINARY TRACT INFECTION ASSOCIATED WITH CATHETERIZATION OF URINARY TRACT, UNSPECIFIED INDWELLING URINARY CATHETER TYPE, SUBSEQUENT ENCOUNTER: ICD-10-CM

## 2025-04-19 DIAGNOSIS — N39.0 FREQUENT UTI: ICD-10-CM

## 2025-04-19 DIAGNOSIS — Z16.24 MULTIPLE DRUG RESISTANT ORGANISM (MDRO) CULTURE POSITIVE: ICD-10-CM

## 2025-04-19 DIAGNOSIS — N39.0 URINARY TRACT INFECTION ASSOCIATED WITH CATHETERIZATION OF URINARY TRACT, UNSPECIFIED INDWELLING URINARY CATHETER TYPE, SUBSEQUENT ENCOUNTER: ICD-10-CM

## 2025-04-19 LAB
ALBUMIN SERPL BCP-MCNC: 3.4 G/DL (ref 3.2–4.9)
ALBUMIN/GLOB SERPL: 0.8 G/DL
ALP SERPL-CCNC: 68 U/L (ref 30–99)
ALT SERPL-CCNC: 12 U/L (ref 2–50)
ANION GAP SERPL CALC-SCNC: 10 MMOL/L (ref 7–16)
AST SERPL-CCNC: 15 U/L (ref 12–45)
BASOPHILS # BLD AUTO: 0.3 % (ref 0–1.8)
BASOPHILS # BLD: 0.03 K/UL (ref 0–0.12)
BILIRUB SERPL-MCNC: 0.3 MG/DL (ref 0.1–1.5)
BUN SERPL-MCNC: 22 MG/DL (ref 8–22)
CALCIUM ALBUM COR SERPL-MCNC: 9.6 MG/DL (ref 8.5–10.5)
CALCIUM SERPL-MCNC: 9.1 MG/DL (ref 8.5–10.5)
CHLORIDE SERPL-SCNC: 106 MMOL/L (ref 96–112)
CO2 SERPL-SCNC: 24 MMOL/L (ref 20–33)
CREAT SERPL-MCNC: 0.59 MG/DL (ref 0.5–1.4)
CRP SERPL HS-MCNC: 2.43 MG/DL (ref 0–0.75)
EOSINOPHIL # BLD AUTO: 0.2 K/UL (ref 0–0.51)
EOSINOPHIL NFR BLD: 2.3 % (ref 0–6.9)
ERYTHROCYTE [DISTWIDTH] IN BLOOD BY AUTOMATED COUNT: 50.2 FL (ref 35.9–50)
ERYTHROCYTE [SEDIMENTATION RATE] IN BLOOD BY WESTERGREN METHOD: 90 MM/HOUR (ref 0–20)
GFR SERPLBLD CREATININE-BSD FMLA CKD-EPI: 101 ML/MIN/1.73 M 2
GLOBULIN SER CALC-MCNC: 4.1 G/DL (ref 1.9–3.5)
GLUCOSE SERPL-MCNC: 104 MG/DL (ref 65–99)
HCT VFR BLD AUTO: 39.7 % (ref 42–52)
HGB BLD-MCNC: 13.1 G/DL (ref 14–18)
IMM GRANULOCYTES # BLD AUTO: 0.06 K/UL (ref 0–0.11)
IMM GRANULOCYTES NFR BLD AUTO: 0.7 % (ref 0–0.9)
LYMPHOCYTES # BLD AUTO: 1.73 K/UL (ref 1–4.8)
LYMPHOCYTES NFR BLD: 19.6 % (ref 22–41)
MCH RBC QN AUTO: 33.2 PG (ref 27–33)
MCHC RBC AUTO-ENTMCNC: 33 G/DL (ref 32.3–36.5)
MCV RBC AUTO: 100.5 FL (ref 81.4–97.8)
MONOCYTES # BLD AUTO: 0.83 K/UL (ref 0–0.85)
MONOCYTES NFR BLD AUTO: 9.4 % (ref 0–13.4)
NEUTROPHILS # BLD AUTO: 5.99 K/UL (ref 1.82–7.42)
NEUTROPHILS NFR BLD: 67.7 % (ref 44–72)
NRBC # BLD AUTO: 0 K/UL
NRBC BLD-RTO: 0 /100 WBC (ref 0–0.2)
OUTPT INFUS CBC COMMENT OICOM: ABNORMAL
PLATELET # BLD AUTO: 208 K/UL (ref 164–446)
PMV BLD AUTO: 8.5 FL (ref 9–12.9)
POTASSIUM SERPL-SCNC: 4.1 MMOL/L (ref 3.6–5.5)
PROT SERPL-MCNC: 7.5 G/DL (ref 6–8.2)
RBC # BLD AUTO: 3.95 M/UL (ref 4.7–6.1)
SODIUM SERPL-SCNC: 140 MMOL/L (ref 135–145)
WBC # BLD AUTO: 8.8 K/UL (ref 4.8–10.8)

## 2025-04-19 PROCEDURE — 86140 C-REACTIVE PROTEIN: CPT

## 2025-04-19 PROCEDURE — 96365 THER/PROPH/DIAG IV INF INIT: CPT

## 2025-04-19 PROCEDURE — 85025 COMPLETE CBC W/AUTO DIFF WBC: CPT

## 2025-04-19 PROCEDURE — 700105 HCHG RX REV CODE 258: Performed by: NURSE PRACTITIONER

## 2025-04-19 PROCEDURE — 76937 US GUIDE VASCULAR ACCESS: CPT

## 2025-04-19 PROCEDURE — 700111 HCHG RX REV CODE 636 W/ 250 OVERRIDE (IP): Mod: JZ | Performed by: NURSE PRACTITIONER

## 2025-04-19 PROCEDURE — 80053 COMPREHEN METABOLIC PANEL: CPT

## 2025-04-19 PROCEDURE — 85652 RBC SED RATE AUTOMATED: CPT

## 2025-04-19 RX ORDER — 0.9 % SODIUM CHLORIDE 0.9 %
VIAL (ML) INJECTION PRN
Status: CANCELLED | OUTPATIENT
Start: 2025-04-20

## 2025-04-19 RX ORDER — 0.9 % SODIUM CHLORIDE 0.9 %
10 VIAL (ML) INJECTION PRN
Status: CANCELLED | OUTPATIENT
Start: 2025-04-20

## 2025-04-19 RX ORDER — 0.9 % SODIUM CHLORIDE 0.9 %
3 VIAL (ML) INJECTION PRN
Status: CANCELLED | OUTPATIENT
Start: 2025-04-20

## 2025-04-19 RX ADMIN — ERTAPENEM SODIUM 1000 MG: 1 INJECTION, POWDER, LYOPHILIZED, FOR SOLUTION INTRAMUSCULAR; INTRAVENOUS at 14:28

## 2025-04-19 ASSESSMENT — PAIN DESCRIPTION - PAIN TYPE: TYPE: ACUTE PAIN

## 2025-04-19 ASSESSMENT — FIBROSIS 4 INDEX
FIB4 SCORE: 1.77
FIB4 SCORE: 1.77

## 2025-04-19 NOTE — NON-PROVIDER
Anjum arrived via wheelchair for first daily IV antibiotic appointment. Plan of care reviewed, assessment completed. Education with drug specific handout provided, patient verbalized understanding of teaching.   Patient had midline placed earlier today. Midline intact, dressing C/D/I, flushed briskly, brisk blood return noted. Labs drawn off of midline as ordered.   Invanz administered per the MAR, patient tolerated well. Midline flushed post infusion, positive blood return noted. Midline saline locked. Appointment for tomorrow was confirmed with the patient and he was discharged home in stable condition.

## 2025-04-19 NOTE — PROCEDURES
"  Midline Instructions    How to Care for your Midline   Do not take a bath, swim, or use hot tubs when you have a midline.   Cover midline with clear plastic wrap and tape to keep it dry while showering.   Check the midline insertion site daily for leakage, redness, swelling, or pain.   Flush the midline as directed by your health care provider. Let your health care provider know right away if the midine is difficult to flush or does not flush. Do not use force to flush the midline.   Do not use a syringe that is less than 10 mL to flush the midline.   Avoid blood pressure checks on the arm with the midline. Do not take the midline out yourself.   Only a trained clinical professional should remove the midline.   Make sure you or anyone who access your midline washes their hands before using the line.   Make sure the hub of the line is \"scrubbed\" prior to using the line.    Dressing Changes  Change the midline dressing as instructed by your health care provider.   Change your midline dressing if it becomes loose or wet.    When to seek medical attention   Midline is accidentally pulled all the way out.  There is any type of drainage, redness, or swelling where the midline enters the skin.   Noticeable increase in arm circumference due to swelling of arm. You cannot flush the midline, it is difficult to flush, or the midline leaks around the insertion site when it is flushed.   You notice a hole or tear in the midline. You develop chills or a fever.    "

## 2025-04-19 NOTE — PROCEDURES
Vascular Access Team    Date of Insertion: 4/19/2025  Arm Circumference: 34  Internal length: 12cm  External Length: 0cm    Reason for Midline: 10 days abx   Labs: WBC 9.6 , , BUN 24, Cr 0.57,  , INR N/A    Orders confirmed, vessel patency confirmed with ultrasound. Risks and benefits of procedure explained to patient and education regarding line associated bloodstream infections provided. Questions answered.     Power Midline placed in LUE per licensed provider order with ultrasound guidance. 4  Fr, single lumen Power Midline placed in cephalic vein after one attempt(s). 3 mL of 1% lidocaine injected intradermally, 21 gauge microintroducer needle was visualized entering the vein and modified Seldinger technique used. 12 cm catheter inserted with good blood return. Secured at 0 cm marker. Internal positioning stylet removed and verified to be intact. Each lumen flushed without resistance with 10 mL 0.9% normal saline. Midline secured with Biopatch and Tegaderm.     Midline placement is confirmed by nurse using ultrasound and ability to flush and draw blood. Midline is appropriate for use at this time.  Patient tolerated procedure well, without complications.  No X-ray is needed for placement confirmation.  Patient condition relayed to unit RN or ordering physician via this post procedure note in the EMR.     Ultrasound images uploaded to PACS and viewable in the EMR - yes  Ultrasound imaged printed and placed in paper chart - no     BARD Power Midline ref #[id number], Lot #[id number], Expiration Date 06-

## 2025-04-20 ENCOUNTER — OUTPATIENT INFUSION SERVICES (OUTPATIENT)
Dept: ONCOLOGY | Facility: MEDICAL CENTER | Age: 75
End: 2025-04-20
Attending: NURSE PRACTITIONER
Payer: MEDICARE

## 2025-04-20 VITALS
RESPIRATION RATE: 16 BRPM | TEMPERATURE: 97.2 F | HEART RATE: 52 BPM | SYSTOLIC BLOOD PRESSURE: 166 MMHG | OXYGEN SATURATION: 95 % | DIASTOLIC BLOOD PRESSURE: 85 MMHG

## 2025-04-20 DIAGNOSIS — N39.0 RECURRENT UTI (URINARY TRACT INFECTION): ICD-10-CM

## 2025-04-20 PROCEDURE — 96365 THER/PROPH/DIAG IV INF INIT: CPT

## 2025-04-20 PROCEDURE — 700105 HCHG RX REV CODE 258: Performed by: NURSE PRACTITIONER

## 2025-04-20 PROCEDURE — 700111 HCHG RX REV CODE 636 W/ 250 OVERRIDE (IP): Mod: JZ | Performed by: NURSE PRACTITIONER

## 2025-04-20 RX ORDER — 0.9 % SODIUM CHLORIDE 0.9 %
3 VIAL (ML) INJECTION PRN
Status: CANCELLED | OUTPATIENT
Start: 2025-04-21

## 2025-04-20 RX ORDER — 0.9 % SODIUM CHLORIDE 0.9 %
10 VIAL (ML) INJECTION PRN
Status: CANCELLED | OUTPATIENT
Start: 2025-04-21

## 2025-04-20 RX ORDER — 0.9 % SODIUM CHLORIDE 0.9 %
VIAL (ML) INJECTION PRN
Status: CANCELLED | OUTPATIENT
Start: 2025-04-21

## 2025-04-20 RX ADMIN — ERTAPENEM SODIUM 1000 MG: 1 INJECTION, POWDER, LYOPHILIZED, FOR SOLUTION INTRAMUSCULAR; INTRAVENOUS at 16:25

## 2025-04-20 NOTE — PROGRESS NOTES
Pt returns to [facility] via electric scooter for Invanz for UTI.  Pt voices no complaints today.  LUE midline has bloody drainage on biopatch.  Midline flushed with NS and no blood return observed.  Midline dressing changed and direction of lumen adjusted so not in fold of LUE.   Invanz infused without adverse reaction.  Midline flushed with NS, clamped and line secured with sleeve.  Confirmed tomorrow's appt time with pt.  Pt dc home to self care.

## 2025-04-21 ENCOUNTER — OUTPATIENT INFUSION SERVICES (OUTPATIENT)
Dept: ONCOLOGY | Facility: MEDICAL CENTER | Age: 75
End: 2025-04-21
Attending: NURSE PRACTITIONER
Payer: MEDICARE

## 2025-04-21 VITALS
SYSTOLIC BLOOD PRESSURE: 164 MMHG | HEART RATE: 57 BPM | DIASTOLIC BLOOD PRESSURE: 75 MMHG | RESPIRATION RATE: 18 BRPM | WEIGHT: 315 LBS | OXYGEN SATURATION: 94 % | BODY MASS INDEX: 62.32 KG/M2 | TEMPERATURE: 98.4 F

## 2025-04-21 DIAGNOSIS — N39.0 RECURRENT UTI (URINARY TRACT INFECTION): ICD-10-CM

## 2025-04-21 LAB
ALBUMIN SERPL BCP-MCNC: 3.4 G/DL (ref 3.2–4.9)
ALBUMIN/GLOB SERPL: 0.8 G/DL
ALP SERPL-CCNC: 77 U/L (ref 30–99)
ALT SERPL-CCNC: 8 U/L (ref 2–50)
ANION GAP SERPL CALC-SCNC: 9 MMOL/L (ref 7–16)
AST SERPL-CCNC: 16 U/L (ref 12–45)
BASOPHILS # BLD AUTO: 0.3 % (ref 0–1.8)
BASOPHILS # BLD: 0.02 K/UL (ref 0–0.12)
BILIRUB SERPL-MCNC: 0.4 MG/DL (ref 0.1–1.5)
BUN SERPL-MCNC: 20 MG/DL (ref 8–22)
CALCIUM ALBUM COR SERPL-MCNC: 9.5 MG/DL (ref 8.5–10.5)
CALCIUM SERPL-MCNC: 9 MG/DL (ref 8.5–10.5)
CHLORIDE SERPL-SCNC: 108 MMOL/L (ref 96–112)
CO2 SERPL-SCNC: 22 MMOL/L (ref 20–33)
CREAT SERPL-MCNC: 0.66 MG/DL (ref 0.5–1.4)
EOSINOPHIL # BLD AUTO: 0.19 K/UL (ref 0–0.51)
EOSINOPHIL NFR BLD: 3.2 % (ref 0–6.9)
ERYTHROCYTE [DISTWIDTH] IN BLOOD BY AUTOMATED COUNT: 50.4 FL (ref 35.9–50)
GFR SERPLBLD CREATININE-BSD FMLA CKD-EPI: 98 ML/MIN/1.73 M 2
GLOBULIN SER CALC-MCNC: 4.1 G/DL (ref 1.9–3.5)
GLUCOSE SERPL-MCNC: 98 MG/DL (ref 65–99)
HCT VFR BLD AUTO: 40.5 % (ref 42–52)
HGB BLD-MCNC: 13.7 G/DL (ref 14–18)
IMM GRANULOCYTES # BLD AUTO: 0.04 K/UL (ref 0–0.11)
IMM GRANULOCYTES NFR BLD AUTO: 0.7 % (ref 0–0.9)
LYMPHOCYTES # BLD AUTO: 1.44 K/UL (ref 1–4.8)
LYMPHOCYTES NFR BLD: 24.2 % (ref 22–41)
MCH RBC QN AUTO: 34.1 PG (ref 27–33)
MCHC RBC AUTO-ENTMCNC: 33.8 G/DL (ref 32.3–36.5)
MCV RBC AUTO: 100.7 FL (ref 81.4–97.8)
MONOCYTES # BLD AUTO: 0.6 K/UL (ref 0–0.85)
MONOCYTES NFR BLD AUTO: 10.1 % (ref 0–13.4)
NEUTROPHILS # BLD AUTO: 3.66 K/UL (ref 1.82–7.42)
NEUTROPHILS NFR BLD: 61.5 % (ref 44–72)
NRBC # BLD AUTO: 0 K/UL
NRBC BLD-RTO: 0 /100 WBC (ref 0–0.2)
OUTPT INFUS CBC COMMENT OICOM: ABNORMAL
PLATELET # BLD AUTO: 208 K/UL (ref 164–446)
PMV BLD AUTO: 8.7 FL (ref 9–12.9)
POTASSIUM SERPL-SCNC: 4.2 MMOL/L (ref 3.6–5.5)
PROT SERPL-MCNC: 7.5 G/DL (ref 6–8.2)
RBC # BLD AUTO: 4.02 M/UL (ref 4.7–6.1)
SODIUM SERPL-SCNC: 139 MMOL/L (ref 135–145)
WBC # BLD AUTO: 6 K/UL (ref 4.8–10.8)

## 2025-04-21 PROCEDURE — 85025 COMPLETE CBC W/AUTO DIFF WBC: CPT

## 2025-04-21 PROCEDURE — 700111 HCHG RX REV CODE 636 W/ 250 OVERRIDE (IP): Mod: JZ | Performed by: NURSE PRACTITIONER

## 2025-04-21 PROCEDURE — 80053 COMPREHEN METABOLIC PANEL: CPT

## 2025-04-21 PROCEDURE — 700105 HCHG RX REV CODE 258: Performed by: NURSE PRACTITIONER

## 2025-04-21 PROCEDURE — 96365 THER/PROPH/DIAG IV INF INIT: CPT

## 2025-04-21 RX ORDER — 0.9 % SODIUM CHLORIDE 0.9 %
VIAL (ML) INJECTION PRN
Status: CANCELLED | OUTPATIENT
Start: 2025-04-22

## 2025-04-21 RX ORDER — 0.9 % SODIUM CHLORIDE 0.9 %
10 VIAL (ML) INJECTION PRN
Status: CANCELLED | OUTPATIENT
Start: 2025-04-22

## 2025-04-21 RX ORDER — 0.9 % SODIUM CHLORIDE 0.9 %
3 VIAL (ML) INJECTION PRN
Status: CANCELLED | OUTPATIENT
Start: 2025-04-22

## 2025-04-21 RX ADMIN — ERTAPENEM SODIUM 1000 MG: 1 INJECTION, POWDER, LYOPHILIZED, FOR SOLUTION INTRAMUSCULAR; INTRAVENOUS at 17:20

## 2025-04-21 ASSESSMENT — FIBROSIS 4 INDEX: FIB4 SCORE: 1.54

## 2025-04-22 ENCOUNTER — OUTPATIENT INFUSION SERVICES (OUTPATIENT)
Dept: ONCOLOGY | Facility: MEDICAL CENTER | Age: 75
End: 2025-04-22
Attending: NURSE PRACTITIONER
Payer: MEDICARE

## 2025-04-22 VITALS
OXYGEN SATURATION: 95 % | HEART RATE: 58 BPM | DIASTOLIC BLOOD PRESSURE: 73 MMHG | RESPIRATION RATE: 16 BRPM | SYSTOLIC BLOOD PRESSURE: 136 MMHG | TEMPERATURE: 98.4 F

## 2025-04-22 DIAGNOSIS — N39.0 RECURRENT UTI (URINARY TRACT INFECTION): ICD-10-CM

## 2025-04-22 PROCEDURE — 700105 HCHG RX REV CODE 258: Performed by: NURSE PRACTITIONER

## 2025-04-22 PROCEDURE — 96365 THER/PROPH/DIAG IV INF INIT: CPT

## 2025-04-22 PROCEDURE — 700111 HCHG RX REV CODE 636 W/ 250 OVERRIDE (IP): Mod: JZ | Performed by: NURSE PRACTITIONER

## 2025-04-22 RX ORDER — 0.9 % SODIUM CHLORIDE 0.9 %
10 VIAL (ML) INJECTION PRN
Status: CANCELLED | OUTPATIENT
Start: 2025-04-23

## 2025-04-22 RX ORDER — 0.9 % SODIUM CHLORIDE 0.9 %
VIAL (ML) INJECTION PRN
Status: CANCELLED | OUTPATIENT
Start: 2025-04-23

## 2025-04-22 RX ORDER — 0.9 % SODIUM CHLORIDE 0.9 %
3 VIAL (ML) INJECTION PRN
Status: CANCELLED | OUTPATIENT
Start: 2025-04-23

## 2025-04-22 RX ADMIN — ERTAPENEM SODIUM 1000 MG: 1 INJECTION, POWDER, LYOPHILIZED, FOR SOLUTION INTRAMUSCULAR; INTRAVENOUS at 17:06

## 2025-04-22 NOTE — PROGRESS NOTES
Anjum presents to infusion for daily ertapenem for UTI. Patient reports feeling well today, has tolerated IV antibiotics well thus far with no adverse effects.     Midline flushed with NS with positive blood return and labs drawn.    Ertapenem infused over 30 minutes, patient tolerated well with no adverse effects.     Midline flushed post infusion with positive blood return.     Patient left in stable condition, knows when to return for appt tomorrow.     Labs reviewed by RN.

## 2025-04-23 ENCOUNTER — OFFICE VISIT (OUTPATIENT)
Dept: INFECTIOUS DISEASES | Facility: MEDICAL CENTER | Age: 75
End: 2025-04-23
Attending: NURSE PRACTITIONER
Payer: MEDICARE

## 2025-04-23 ENCOUNTER — OFFICE VISIT (OUTPATIENT)
Dept: WOUND CARE | Facility: MEDICAL CENTER | Age: 75
End: 2025-04-23
Payer: MEDICARE

## 2025-04-23 ENCOUNTER — OUTPATIENT INFUSION SERVICES (OUTPATIENT)
Dept: ONCOLOGY | Facility: MEDICAL CENTER | Age: 75
End: 2025-04-23
Attending: NURSE PRACTITIONER
Payer: MEDICARE

## 2025-04-23 VITALS
WEIGHT: 215 LBS | SYSTOLIC BLOOD PRESSURE: 110 MMHG | HEIGHT: 70 IN | TEMPERATURE: 97.2 F | RESPIRATION RATE: 10 BRPM | BODY MASS INDEX: 30.78 KG/M2 | OXYGEN SATURATION: 96 % | DIASTOLIC BLOOD PRESSURE: 70 MMHG | HEART RATE: 50 BPM

## 2025-04-23 VITALS
RESPIRATION RATE: 18 BRPM | HEART RATE: 52 BPM | SYSTOLIC BLOOD PRESSURE: 167 MMHG | TEMPERATURE: 96.5 F | DIASTOLIC BLOOD PRESSURE: 84 MMHG | OXYGEN SATURATION: 97 %

## 2025-04-23 DIAGNOSIS — T83.511D URINARY TRACT INFECTION ASSOCIATED WITH CATHETERIZATION OF URINARY TRACT, UNSPECIFIED INDWELLING URINARY CATHETER TYPE, SUBSEQUENT ENCOUNTER: ICD-10-CM

## 2025-04-23 DIAGNOSIS — N31.9 NEUROGENIC URINARY BLADDER DISORDER: ICD-10-CM

## 2025-04-23 DIAGNOSIS — G82.53 QUADRIPLEGIA, C5-C7 COMPLETE (HCC): ICD-10-CM

## 2025-04-23 DIAGNOSIS — Z16.24 MULTIPLE DRUG RESISTANT ORGANISM (MDRO) CULTURE POSITIVE: ICD-10-CM

## 2025-04-23 DIAGNOSIS — G82.54 QUADRIPLEGIA, C5-C7, INCOMPLETE (HCC): ICD-10-CM

## 2025-04-23 DIAGNOSIS — L89.90 PRESSURE ULCERS OF SKIN OF MULTIPLE TOPOGRAPHIC SITES: ICD-10-CM

## 2025-04-23 DIAGNOSIS — T83.038D: ICD-10-CM

## 2025-04-23 DIAGNOSIS — L89.314 PRESSURE INJURY OF RIGHT ISCHIUM, STAGE 4 (HCC): ICD-10-CM

## 2025-04-23 DIAGNOSIS — L89.324 PRESSURE INJURY OF LEFT ISCHIUM, STAGE 4 (HCC): ICD-10-CM

## 2025-04-23 DIAGNOSIS — L89.154 SACRAL DECUBITUS ULCER, STAGE IV (HCC): ICD-10-CM

## 2025-04-23 DIAGNOSIS — N39.0 URINARY TRACT INFECTION ASSOCIATED WITH CATHETERIZATION OF URINARY TRACT, UNSPECIFIED INDWELLING URINARY CATHETER TYPE, SUBSEQUENT ENCOUNTER: ICD-10-CM

## 2025-04-23 DIAGNOSIS — N39.0 RECURRENT UTI (URINARY TRACT INFECTION): ICD-10-CM

## 2025-04-23 DIAGNOSIS — Z93.59 SUPRAPUBIC CATHETER (HCC): ICD-10-CM

## 2025-04-23 DIAGNOSIS — N39.0 FREQUENT UTI: ICD-10-CM

## 2025-04-23 DIAGNOSIS — M86.18 OTHER ACUTE OSTEOMYELITIS, OTHER SITE (HCC): ICD-10-CM

## 2025-04-23 PROCEDURE — 3078F DIAST BP <80 MM HG: CPT | Performed by: NURSE PRACTITIONER

## 2025-04-23 PROCEDURE — 700105 HCHG RX REV CODE 258: Performed by: NURSE PRACTITIONER

## 2025-04-23 PROCEDURE — 96365 THER/PROPH/DIAG IV INF INIT: CPT

## 2025-04-23 PROCEDURE — 11046 DBRDMT MUSC&/FSCA EA ADDL: CPT

## 2025-04-23 PROCEDURE — 99212 OFFICE O/P EST SF 10 MIN: CPT | Performed by: NURSE PRACTITIONER

## 2025-04-23 PROCEDURE — 700111 HCHG RX REV CODE 636 W/ 250 OVERRIDE (IP): Mod: JZ | Performed by: NURSE PRACTITIONER

## 2025-04-23 PROCEDURE — 11046 DBRDMT MUSC&/FSCA EA ADDL: CPT | Performed by: STUDENT IN AN ORGANIZED HEALTH CARE EDUCATION/TRAINING PROGRAM

## 2025-04-23 PROCEDURE — 99215 OFFICE O/P EST HI 40 MIN: CPT | Performed by: NURSE PRACTITIONER

## 2025-04-23 PROCEDURE — 3074F SYST BP LT 130 MM HG: CPT | Performed by: NURSE PRACTITIONER

## 2025-04-23 PROCEDURE — 11043 DBRDMT MUSC&/FSCA 1ST 20/<: CPT

## 2025-04-23 PROCEDURE — 11042 DBRDMT SUBQ TIS 1ST 20SQCM/<: CPT

## 2025-04-23 PROCEDURE — 11043 DBRDMT MUSC&/FSCA 1ST 20/<: CPT | Performed by: STUDENT IN AN ORGANIZED HEALTH CARE EDUCATION/TRAINING PROGRAM

## 2025-04-23 RX ORDER — 0.9 % SODIUM CHLORIDE 0.9 %
10 VIAL (ML) INJECTION PRN
Status: CANCELLED | OUTPATIENT
Start: 2025-04-24

## 2025-04-23 RX ORDER — 0.9 % SODIUM CHLORIDE 0.9 %
VIAL (ML) INJECTION PRN
Status: CANCELLED | OUTPATIENT
Start: 2025-04-24

## 2025-04-23 RX ORDER — 0.9 % SODIUM CHLORIDE 0.9 %
3 VIAL (ML) INJECTION PRN
Status: CANCELLED | OUTPATIENT
Start: 2025-04-24

## 2025-04-23 RX ADMIN — ERTAPENEM SODIUM 1000 MG: 1 INJECTION, POWDER, LYOPHILIZED, FOR SOLUTION INTRAMUSCULAR; INTRAVENOUS at 16:56

## 2025-04-23 ASSESSMENT — ENCOUNTER SYMPTOMS
SHORTNESS OF BREATH: 0
HEADACHES: 0
MYALGIAS: 0
COUGH: 0
FEVER: 0
CHILLS: 0
WEIGHT LOSS: 0
SPUTUM PRODUCTION: 0
DOUBLE VISION: 0
FOCAL WEAKNESS: 1
WHEEZING: 0
BLURRED VISION: 0
CONSTIPATION: 0
NAUSEA: 0
VOMITING: 0
PALPITATIONS: 0
DIARRHEA: 0
ABDOMINAL PAIN: 0
NERVOUS/ANXIOUS: 0
BRUISES/BLEEDS EASILY: 0
DIZZINESS: 0

## 2025-04-23 ASSESSMENT — FIBROSIS 4 INDEX: FIB4 SCORE: 2.01

## 2025-04-23 NOTE — PATIENT INSTRUCTIONS
-Keep dressings clean and dry. Change dressings if they become over saturated, soiled or fall off. Otherwise, your home health nurse will change your dressings between wound clinic visits.    -On the days you are not changing your dressings, avoid getting the dressings wet. It is not harmful to get your wound wet when you are washing your wound before applying a new dressing.    -If you need to change your dressings at home: Wash your wounds with normal saline, wound cleanser, or unscented soap and water prior to applying your new dressings. Please avoid cleansing with hydrogen peroxide or rubbing alcohol. Hydrogen peroxide and rubbing alcohol are toxic to new tissue and skin cells.    -Should you experience any significant changes in your wounds, such as signs of infection (increasing redness, swelling, localized heat, increased pain, fever > 101 F, chills) or have any questions regarding your home care instructions, please contact the wound center at [phone]. If after hours, contact your primary care physician or go to the hospital emergency room.     -If you are admitted to any hospital, you will need a new referral to come back to the wound clinic and any scheduled appointments that you already have, may be cancelled.     -If you are 5 or more minutes late for an appointment, we reserve the right to cancel and reschedule that appointment. Additionally, if you are habitually late or not showing (3 late cancellations and/or no shows), we reserve the right to cancel your remaining appointments and it will be your responsibility to obtain a new referral if services are still needed.

## 2025-04-23 NOTE — PROGRESS NOTES
Provider Encounter- Pressure Injury        HISTORY OF PRESENT ILLNESS  Wound History:    START OF CARE IN CLINIC: 1/31/2024 (return to clinic after hospitalization)    REFERRING PROVIDER: Racquel York       WOUNDS-superior coccyx pressure injury, stage IV-resolved                                 Coccyx pressure injury, stage IV-resolved           Inferior coccyx pressure injury, stage IV resolved                                 Right ischial pressure injury, stage IV                                 Left heel pressure injury, recurring stage III-resolved                                 Left posterior lower leg/calf-stage III-resolved                                 Left medial lower leg surgical wound dehiscence-resolved, reopens frequently-resolved            Left ischial pressure injury, stage IV-first observed in clinic 5/1/2024          HISTORY: Patient with history of incomplete quadriplegia referred to [facility] for treatment of a stage IV pressure injury.  He has a history of previous pressure injuries to this area, and underwent muscle flaps in 2019, and then again in 2020.  He was seen in the wound clinic in November 2021 for an ulcer proximal from his current ulcer, and pressure injuries to his left posterior lower leg and left heel.  At that time, it was discovered that the patient had retained VAC foam embedded in the wound bed of the sacral wound.  Attempts were made to get him back to his plastic surgeon, though unsuccessful.  In January he underwent surgical removal of VAC sponge along with excisional debridement of his sacral wound by Dr. Chaves.  After the surgery, his wound went on to heal without incident.   In early April 2022, his home health nurse noted a new sacral ulcer, below the previous ulcer which quickly tripled in size over the following weeks.  The ulcer to his left medial lower leg had also deteriorated, with bone visible at the base..  He was hospitalized from 4/22 until 4/27/2022 and  underwent surgery with Dr. Raman on 4/26 for irrigation and debridement of multiple compartments of the left lower extremity, bone excision, and complex closure of chronic wound using biologic skin substitute.   His sacrococcygeal wound was not surgically addressed during this admission.  He was discharged back to his group home, with home health, and referral to outpatient wound clinic for his sacral wound.  He was instructed to follow-up with his surgeon for his lower leg wound.       Postoperatively, the left medial lower leg incision dehisced.  He was seen by his surgeon at [facility] on 5/11.  The surgeon opted to leave remaining sutures in place, and refer him to the wound clinic for treatment of this wound.   Treatment of this wound was initiated in clinic on 5/12.  During this visit was also noted that his heel DTI had resolved, but that he had a new pressure injury to his left posterior lower extremity.     A new pressure injury was noted to patient's right upper buttock/lower back on 5/20/2022.  Wound was linear in shape, skin discolored but intact.     Abrasion noted to left anterior lower leg.  First observed in clinic on 7/22/2022.  Patient states he bumped his leg into his food tray.     Small DTI noted to patient's left lateral lower leg on 7/29/2022.  Skin intact but discolored.     Large area of deep tissue injury noted to patient's left exterior lower leg.  Patient denied any trauma to this area.  Skin intact.  Wound documented.    1/27/2023: [facility] 1/23-1/25/2023 with gross hematuria. He underwent RICHARD which showed watchman device was in place and he was taken off of Xarelto. While hospitalized wound team was consulted. He was referred back to [facility] and home health upon discharge.    Patient was hospitalized at [facility] for pyelonephritis from 2/26 until 3/2/2023, admitted for fever and general malaise.  He was admitted and initially started on linezolid and meropenem for suspected  UTI and history of multidrug-resistant organisms.  Urine cultures were negative. ID was consulted, recommended CT of chest and abdomen,which were negative for acute findings. However, he was treated with 5 days total course of antibiotics for suspected UTI, and symptoms completely resolved.  During this admission, the inpatient wound team was consulted for treatment of his sacral and lower leg wounds.  A wound culture was taken from his left heel pressure ulcer, negative.  Once stabilized, he was discharged home and referred back to [facility] to resume treatment of his wounds.    Patient was hospitalized at [facility] from 12/11 until 12/23/2023, admitted for fever.  Wound infection suspected.  CT scan of abdomen and pelvis for evaluation of sacral pressure injury showed gas tracking down to the bone consistent with osteomyelitis.  He underwent I&D of right ischial ulcer (documented as buttock) with Dr. Bansal, medial tract leading to an abscess was identified.  Cavity was opened allowing it to drain into the main wound bed.  Wound VAC was placed and managed by wound team during this admission.  A bone scan of patient's left foot was also done, initially concerning for osteomyelitis.  Orthopedic surgery was consulted and did not recommend surgical intervention. ID consulted also, recommended the patient to receive IV ertapenem 1 g every 24 hours plus IV daptomycin 8 mg/kg every 24 hours through 1/22/2024.   He was discharged to [facility] on 1/23 for IV antibiotics and wound care.  From the LTAC he was discharged home on 1/22 with home health and referral back to [facility] to resume management of his wounds      Pertinent Medical History: Incomplete quadriplegia, history of stage IV pressure injuries, history of flap procedures to sacral pressure injuries, osteomyelitis, obesity, colostomy in place   Contributing factors: Immobility and Obesity, impaired sensation    Personal support: Attendant-staff at care home and home health  nursing    TOBACCO USE:   Former smoker, quit in 1977.  Never used smokeless tobacco    Patient's problem list, allergies, and current medications reviewed and updated in Epic    Interval History:  Interval History thinned 7/29/2022.  Please see previous notes for complete interval history.   Interval History thinned 1/27/2023. Please see previous note for complete interval history.  Interval History thinned 3/3/2023.  Please see previous notes for complete interval history.    Interval History thinned 8/4/2023.  Please see previous notes for complete interval history.    Interval History thinned 1/31/2024.  Please see previous notes for complete interval history.    Interval History thinned 6/26/2024.  Please see previous notes for complete interval history.      6/19/2024: Clinic visit with Steve Hodges MD. Patient denies any fevers or chills. Reports he is tolerating Abx. He has appointment with ID later today to discuss OM treatment. He is tolerating wound VAC. Slight bone exposed bilaterally in ischial wounds, slightly worse.    6/26/2024 : Clinic visit with ADILSON Arthur, FNPEGGY-BC, CWDINESHN, CFTRACI.   Patient presents today with significant deterioration of his both ischial wounds, with increased depth of undermining.   He now has a PICC line, and will be starting outpatient infusions of ertapenem later today.  He states he is feeling well, denies fevers, chills, nausea, vomiting, cough or shortness of breath.  Due to deterioration of his wound, we did discuss possibly having him go over the hospital for surgical debridement and IV antibiotics.  He was hesitant to do so.  We agreed to see how he looks after a week or so IV antibiotics.  He is agreeable to minimizing time up in his wheelchair.  He also agrees to go to the emergency room immediately if he develops any signs or symptoms of infection.    7/10/2024: Clinic visit with Steve Hodges MD. Patient reports feeling in normal state of health. He missed  last appointment as he had fall out of wheelchair and was evaluated in ED. He denies any injuries from fall. Patient wounds are measuring slightly smaller. He continues on Ertapenem. Patient reports that he has appointment for seating evaluation from [facility] scheduled, but does not recall the date.    7/17/2024 : Clinic visit with ADILSON Arthur, HOLDEN, LILIYA VALERIO.   Anjum states he is feeling well.  Left ischial wound measures significantly smaller today, however measurements vary somewhat depending on pt's position.  Both wounds appear to be progressing slightly, with improving tissue quality.    7/24/2024 : Clinic visit with ADILSON Arthur, HOLDEN, IMAN, LILIYA.   Patient continues to feel well, offers no complaints.  Right ischial wound measures smaller, left ischial wound is larger.  Patient tolerating VAC without any difficulty.  Home health continues to see him in between clinic visits.  He is still receiving daily infusions of IV antibiotics, will be completing these in August.    7/31/2024 : Clinic visit with ADILSON Arthur, HOLDEN, IMAN, LILIYA.   Anjum continues to feel well, his wounds are slowly progressing.  Tolerating VAC.  He is on IV antibiotics for 1 more week.  Admits that he is up in his wheelchair for 10 to 14 hours/day.    8/7/2024 : Clinic visit with ADILSON Arthur FNP-BC, LILIYA VALERIO.   Anjum states he is feeling well today, offers no complaints.  Both wounds measure a bit smaller today, new granulation tissue noted.  He will be getting his last IV antibiotic infusion today.    8/14/2024 : Clinic visit with ADILSON Arthur FNP-BC, IMAN, LILIYA.   Patient continues to feel well.  He has completed his antibiotics, followed up with ID earlier this week, no further antibiotic therapy at this time.  Ischial wounds are slowly progressing.  Lower extremity wounds remain resolved.    8/21/2024 : Clinic visit with ADILSON Arthur FNP-BC, LILIYA VALERIO.   Anjum states he is feeling  well, offers no complaints.  His wounds are slowly progressing, increased granulation.    8/28/2024 : Clinic visit with ADILSON Arthur, HOLDEN, LILIYA VALERIO.   Turk states he is feeling well overall.  His wounds measure slightly smaller.  He has had some urinary symptoms, reports leakage from around his suprapubic catheter last night, and excessively full urine bag.  He has been in contact with his urologist office.  He also mentions that he has a recurring small scab to his nose, states that it falls off only to return shortly after.  This has been going on for several months.  I offered to refer him to dermatology.    9/11/2024 : Clinic visit with ADILSON Arthur, HOLDEN, LILIYA VALERIO.   Turk states he is feeling well.  He missed his appointment last week due to car troubles.  His vehicle is now running well.  Ischial wounds show some improvement, increased granulation tissue.  He does have a small reopening of left posterior lower leg wound, appears to be a small abrasion over area of scar tissue.    9/18/2024: Clinic visit with Steve Hodges MD. Patient reports doing ok. Denies any acute issues with wound VAC. Reports that he was fitted by [employer] for new seat cushion and is being manufactured, he is not sure when will be ready. Patient's wounds appears slightly improved. Left posterior leg wound remains open.    9/25/2024 : Clinic visit with ADILSON Arthur, HOLDEN, IMAN, LILIYA.   Patient states he is feeling well.  His wounds measure about the same.    10/2/2024: Clinic visit with HOLDEN Johnson CWON, LILIYA.  Pt denies fevers, chills, nausea, vomiting. Bilateral ischial ulcers increased in area.  Left IT quality overall worse compared to right IT.  Recommend Dakin's soak.  Continue with VAC to bilateral IT.    10/9/2024 : Clinic visit with ADILSON Arthur, HOLDEN, IMAN, LILIYA.   Patient continues to feel well overall.  His wounds measure about the same, no evidence of infection.   He does have some minor breakdown over the scar tissue of his sacrum which bears watching.  He has not heard from [employer] about his seat cushion, states he will contact them again.    10/16/2024 : Clinic visit with ADILSON Arthur, HOLDEN, IMAN, LILIYA.   Anjum continues to feel well.  His wounds measure slightly smaller.  Sacral wound just slightly open.  He called [employer] earlier this week, was told his new cushion is ready, and that they would deliver it next Monday.  He also states he is due for a new wheelchair, but has not yet become process.      10/23/2024 : Clinic visit with ADILSON Arthur, HOLDEN, IMAN, LILIYA.   Anjum's wounds present today with significantly more odor.  He states he is feeling fine, denies fevers, chills, nausea, vomiting, cough or shortness of breath.  Increased drainage noted.  Wounds measure about the same.  Culture collected in clinic today after debridement.  VAC placed on hold.  Wounds packed with Puracyn moistened silver Hydrofiber.   Patient reminded that he is to go to the emergency room if he notices any increased redness, swelling, drainage or odor, or if he develops fever, chills, nausea or vomiting.    He has a new cushion to his wheelchair.  States that he does not yet have a new wheelchair, will be picking on out the next few weeks.    10/30/2024 : Clinic visit with ADILSON Arthur, HOLDEN, IMAN, LILIYA.   Anjum states he is feeling well.  Wound odor still noticeable.  Culture collected last visit was positive for light growth of Proteus.  Given continued odor, will go ahead and prescribe short course of Augmentin, no other p.o. options available based on sensitivities.  Continue with Dakin's wound cleansing.  Home health to restart VAC on Friday 11/6/2024: Clinic visit with Steve Hodges MD. Patient reports doing well, denies any acute issues. Tolerating augmentin well without side effects. Odor much improved today. Wounds are improving slowly. Continue wound  VAC.    11/14/2024: Clinic visit with Steve Hodges MD. Patient reports doing ok. He was frustrated coming into clinic, was having trouble exiting his van. No evidence of wound infection today    11/20/2024: Clinic visit with Steve Hodges MD. Patient reports feeling in normal state of health. His ischial wounds stable and slowly improving. He has reopened sacral wound however. Denies any signs or symptoms of infection.    11/27/2024: Clinic visit with Steve Hodges MD. Patient reports doing well, denies any acute issues. Sacral wound measuring smaller. Right ischial wound smaller. Left ischial wound larger with increased slough and necrotic tissue at base of wound. Patient has contacted [employer] technician to evaluate seat due to reopening of sacrum, he is pending call back.    12/4/2024: Clinic visit with Steve Hodges MD. Patient reports doing well, denies any signs or symptoms of infection. Denies any issues with wound VAC. Sacrum has resolved. Right ischium wound larger with necrotic tissue, left ischium stable. He denies any traumatic events or any changes to his routine that would have increased pressure on right ischium besides time spent in chair during thanksgiving with family.    12/11/2024: Clinic visit with Steve Hodges MD. Patient reports doing well, denies any acute issues. Wounds are stable. No further necrosis of right ischium. Patient denies any signs or symptoms of infection.    12/18/2024: Clinic visit with Steve Hodges MD. Patient reports doing ok. Reports was diagnosed with UTI by urology, picking up Abx later today, thinks that he was prescribed Augmentin. Right ischial wound measuring larger, dusky tissue concerning for increased pressure. He reports that he has been up in wheelchair more this week with friend in town and has not been using tilt feature of chair as frequently. He was encouraged to spend more time offloading.   Due to holiday's, and dressings only going to be  changed 2x weekly, it is medically necessary to continue wound VAC for now as it would be severely problematic for patient to be sitting with saturated dressings during this period.    1/8/2025: Clinic visit with Steve Hodges MD. Patient reports chest congestion with low grade fevers for last few days. Denies any issues with wounds. He has increased necrosis of left IT pressure injury, he denies any changes in activity and is using tilt feature in chair every 30 min. Patient reports that he has appointment with [facility] next Friday to re-evaluate custom seat.    1/15/2025: Clinic visit with Steve Hodges MD. Patient returns to clinic following hospitalization for mycoplasma pneumonia.  Patient reports that he is feeling better denies any fevers or chills currently.  Patient has upcoming seating eval by [employer].  Wound VAC has been held since last week, wound seem to be slightly improved with holding VAC. Will continue to hold this this week.    1/22/2025: Clinic visit with Steve Hodges MD. Patient reports doing well, denies any acute issues. He had seat cushion re-evaluation and the technician noted that he had no hot spots when he was back far enough in chair, but if he slides forward then he is putting pressure on ischial tuberosities which explains the concern for pressure noted last few weeks. Patient will keep wheelchair slightly tilted back. Wounds with increased granulation tissue. Discontinue wound VAC and he will return to .    1/29/2025: Clinic visit with Steve Hodges MD. Patient reports doing ok, denies any signs or symptoms of infection. Patient assures me that he is sitting further back in chair as recommended by PT/wheel chair technician. Wounds are slowly improving, he reports less drainage. Will trial use of Hydrofiber instead of Enluxtra.    2/5/2025: Clinic visit with Steve Hodges MD. Patient reports doing well. Wounds are not overly macerated after holding Enluxtra,  continue hydrofiber.    2/12/2025: Clinic visit with Steve Hodges MD. Patient reports doing ok. Reports that Monday night his catheter was obstructed and caused leak saturating dressings. His group home changed and dried him, but dressings remained saturated. If something like this should occur again, patient will contact home health for dressing change.    2/19/2025 : Clinic visit with ADILSON Arthur, HOLDEN, IMAN, LILIYA.   Chart presents today saturated in urine due to leakage from his suprapubic catheter.  He states this is a frequent event due to bladder spasms.  He has seen his urologist several times, recently started getting Botox injections, does not feel this has helped.  I suggest that he consider getting a urostomy, would divert from bladder altogether, and if well created, would result in better containment of his urine.  He states he has an appointment with his urologist next month and will discuss with him.   His wounds continue to progress, but both measure smaller.    2/26/2025: Clinic visit with Steve Hodges MD. Patient reports doing well, denies any acute issues. Still having occasional leaking from catheter. Patient wounds measure about the same, however tissue quality has improved.     3/5/2025: Clinic visit with Steve Hodges MD. Patient reports doing well, denies any acute issues. Patients wounds are measuring smaller.  Right ischium wound is hypergranular.    3/12/2025 : Clinic visit with ADILSON Arthur, HOLDEN, IMAN, LILIYA.   States he is feeling well overall.  He is not having as much leakage from his suprapubic catheter.  However, he has an appointment with his urologist tomorrow, and will be discussing possible urostomy for better management of his urine.     His wounds continue to progress, significant improvement since modification to wheelchair cushion.    3/19/2025: Clinic visit with Steve Hodges MD. Patient reports doing well. He botox injections in bladder to  decrease leakage form catheter. He discussed urostomy with urologist, but they are going to try additional treatments before considering. Patient wounds continue to make slow improvement.    3/26/2025 : Clinic visit with ADILSON Arthur, DAT-BC, ALEXN, CFTRACI.   Anjum continues to feel well.  He has been having much less drainage from his suprapubic catheter, receiving Botox injections every 6 months.  His ischial wounds are steadily progressing.  Right ischial wound measures slightly larger due to excision of skin flap from distal wound edge.    4/2/2025: Clinic visit with Steve Hodges MD. Patient reports doing ok. He denies any signs or symptoms of infection. His right ischial wound with increased dusky appearance of tissue. He received new wheelchair which is same model as previous and is compatible with his custom cushion which he has been using. Assessed seat and chair today, appears to be satisfactory. Wounds slightly larger with mild increase in odor. Discussed using puracyn gel for additional antimicrobial coverage.    4/9/2025: Clinic visit with Steve Hodges MD. Patient reports doing well, he denies any signs or symptoms of infection. Wound smaller today. No leaks this week from hutchins.    4/16/2025: Clinic visit with Steve Hodges MD. Patient was seen in ED on Saturday for low grade fever, found to have UTI. He is following with ID for proteus infection and will be starting IV ertapenem. Patient reports increased leakage from superpubic which was exchanged in ED. His right ischial wound larger. Continues to have heavy drainage, recommend discontinuing puracyn gel and will try to add layer of enluxtra with backing removed to see if will manage drainage better.    4/23/2025: Clinic visit with Steve Hodges MD. Patient spent more time in chair this week with Easter and wounds slightly larger though measurements are fairly positional. Less maceration and saturation of cover dressing with enluxtra,  will continue.    REVIEW OF SYSTEMS:   Unchanged from previous wound clinic assessment on 4/16/2025, except as noted in interval history above.      PHYSICAL EXAMINATION:   There were no vitals taken for this visit.  Physical Exam  Constitutional:       Appearance: He is obese.   Cardiovascular:      Rate and Rhythm: Normal rate.   Pulmonary:      Effort: Pulmonary effort is normal.   Abdominal:      Comments: Colostomy left lower quadrant   Genitourinary:     Comments: Suprapubic catheter to down drain   Skin:     Comments: Stage IV pressure injury to right ischium: Wound larger, heavy drainage. Thin layer of slough to wound bed.  No odor. No periwound erythema or induration    Stage IV pressure injury of left ischium: Wound larger. Thin layer of slough to wound bed. Heavy serosanguineous drainage with no odor today. No evidence of soft tissue infection    Stage IV pressure injury sacrum: Remains resolved    All other wounds remain healed, high risk for recurrence     Neurological:      Mental Status: He is alert and oriented to person, place, and time.   Psychiatric:         Mood and Affect: Mood normal.         WOUND ASSESSMENT  Wound 12/12/23 Pressure Injury Ischium Right (Active)   Wound Image    04/23/25 1600   Site Assessment Red;Pink;Yellow 04/23/25 1600   Periwound Assessment Scar tissue;Maceration 04/23/25 1600   Margins Unattached edges 04/23/25 1600   Closure Secondary intention 04/02/25 1559   Drainage Amount Large 04/23/25 1600   Drainage Description Serosanguineous 04/23/25 1600   Treatments Cleansed;Provider debridement 04/23/25 1600   Offloading/DME Other (comment) 04/23/25 1600   Wound Cleansing Hypochlorus Acid 04/23/25 1600   Periwound Protectant No-sting Skin Prep;Barrier Paste 04/23/25 1600   Dressing Changed Changed 04/23/25 1600   Dressing Cleansing/Solutions Normal Saline 04/23/25 1600   Dressing Options Hydrofera Blue Classic;Other (Comments);Offloading Dressing - Sacral 04/23/25 1600    Dressing Change/Treatment Frequency Monday, Wednesday, Friday, and As Needed 04/23/25 1600   WOUND NURSE ONLY - Pressure Injury Stage 4 04/23/25 1600   Non-staged Wound Description Not applicable 04/02/25 1559   Wound Length (cm) 6.6 cm 04/23/25 1600   Wound Width (cm) 2 cm 04/23/25 1600   Wound Depth (cm) 0.7 cm 04/23/25 1600   Wound Surface Area (cm^2) 13.2 cm^2 04/23/25 1600   Wound Volume (cm^3) 9.24 cm^3 04/23/25 1600   Post-Procedure Length (cm) 6.6 cm 04/23/25 1600   Post-Procedure Width (cm) 2.1 cm 04/23/25 1600   Post-Procedure Depth (cm) 0.7 cm 04/23/25 1600   Post-Procedure Surface Area (cm^2) 13.86 cm^2 04/23/25 1600   Post-Procedure Volume (cm^3) 9.702 cm^3 04/23/25 1600   Wound Healing % 84 04/23/25 1600   Tunneling (cm) 0 cm 04/23/25 1600   Tunneling Clock Position of Wound 6 03/26/25 1500   Undermining (cm) 1 cm 04/23/25 1600   Undermining of Wound, 1st Location From 4 o'clock;To 7 o'clock 04/23/25 1600   Undermining (cm) - 2nd location 0.5 cm 02/19/25 1500   Undermining of Wound, 2nd Location From 7 o'clock;From 12 o'clock;To 2 o'clock 02/19/25 1500   Wound Odor None 04/23/25 1600   Exposed Structures None 04/23/25 1600   Number of days: 499       Wound 05/01/24 Pressure Injury Ischium Left (Active)   Wound Image    04/23/25 1600   Site Assessment Pink;Red;Yellow 04/23/25 1600   Periwound Assessment Scar tissue;Maceration 04/23/25 1600   Margins Unattached edges 04/23/25 1600   Closure Secondary intention 04/02/25 1559   Drainage Amount Large 04/23/25 1600   Drainage Description Serosanguineous 04/23/25 1600   Treatments Cleansed;Provider debridement 04/23/25 1600   Offloading/DME Other (comment) 04/23/25 1600   Wound Cleansing Hypochlorus Acid 04/23/25 1600   Periwound Protectant No-sting Skin Prep;Barrier Paste 04/23/25 1600   Dressing Changed Changed 04/23/25 1600   Dressing Cleansing/Solutions Normal Saline 04/23/25 1600   Dressing Options Hydrofera Blue Classic;Other (Comments);Offloading  "Dressing - Sacral 04/23/25 1600   Dressing Change/Treatment Frequency Monday, Wednesday, Friday, and As Needed 04/23/25 1600   Wound Team Following Weekly 12/18/24 1700   WOUND NURSE ONLY - Pressure Injury Stage 4 04/23/25 1600   Non-staged Wound Description Not applicable 04/02/25 1559   Wound Length (cm) 5 cm 04/23/25 1600   Wound Width (cm) 2.1 cm 04/23/25 1600   Wound Depth (cm) 2.5 cm 04/23/25 1600   Wound Surface Area (cm^2) 10.5 cm^2 04/23/25 1600   Wound Volume (cm^3) 26.25 cm^3 04/23/25 1600   Post-Procedure Length (cm) 5.1 cm 04/23/25 1600   Post-Procedure Width (cm) 2.2 cm 04/23/25 1600   Post-Procedure Depth (cm) 2.5 cm 04/23/25 1600   Post-Procedure Surface Area (cm^2) 11.22 cm^2 04/23/25 1600   Post-Procedure Volume (cm^3) 28.05 cm^3 04/23/25 1600   Wound Healing % -[id number] 04/23/25 1600   Tunneling (cm) 0 cm 04/23/25 1600   Undermining (cm) 0 cm 04/23/25 1600   Undermining of Wound, 1st Location From 4 o'clock;To 10 o'clock 12/18/24 1700   Wound Odor None 04/23/25 1600   Exposed Structures None 04/23/25 1600   Number of days: 358       PROCEDURE: Excisional debridement of right and left ischial wounds  -2% viscous lidocaine applied topically to wound bed for approximately 5 minutes prior to debridement  -Curette used to debride both wound beds.  Excisional debridement was performed to remove devitalized tissue until healthy, bleeding tissue was visualized.  Total area debrided was 25.08 cm², including into undermined areas of wounds.  Tissue debrided into the muscle / fascia layer.    -Bleeding controlled with manual pressure.  -Wound care completed by wound RN, refer to flowsheet  -Patient tolerated the procedure well, without c/o pain or discomfort.    Pertinent Labs and Diagnostics:    Labs:     A1c: No results found for: \"HBA1C\"     [facility] results, 7/1/2022 (under media tab)    CRP 13    ESR 31      IMAGING:     X-ray left tib-fib ordered 2/16/2024 through [facility]    12/11/2023-CT of " abdomen pelvis with contrast  IMPRESSION:   1.  Right ischial decubitus ulcer extending to bone with soft tissue gas tracking along the right perineum. Appearance suggesting osteomyelitis, consider component of necrotizing fasciitis as clinically appropriate.  2.  Small pericardial effusion  3.  Left adrenal nodule, density on prior noncontrast CT demonstrates adenoma.  4.  Hepatomegaly  5.  Enlarged prostate, workup and evaluation for causes of prostate enlargement recommended as clinically appropriate.  6.  Atherosclerosis and atherosclerotic coronary artery disease    12/15/2023-bone scan of left foot  IMPRESSION:     1.  Mild increased activity in the LEFT 1st and 3rd toes on blood pool and delayed images possibly indicating inflammation/infection.  2.  No significant blood flow asymmetry.          VASCULAR STUDIES: No results found.    LAST  WOUND CULTURE:   Lab Results   Component Value Date/Time    CULTRSULT Usual skin ade 10-50,000 cfu/mL (A) 04/12/2025 09:45 PM    CULTRSULT Proteus mirabilis  ,000 cfu/mL   (A) 04/12/2025 09:45 PM      PATHOLOGY  2/17/2023-bone fragment extracted from left lower extremity wound\\  FINAL DIAGNOSIS:     A. Left leg bone fragment at base of chronic wound:          Extensively degenerated fibrocartilaginous tissue with a rim of           fibrinopurulent debris          Correlate with culture findings            Comment: While no residual intact bone is identified, these           findings are suggestive of adjacent osteomyelitis.      ASSESSMENT AND PLAN:     1. Sacral decubitus ulcer, stage IV (HCC)  Comments: Ulcer first noted in early April 2022 as small open area which quickly enlarged.  This ulcer is present distal from previous sacral ulcer which healed after surgery in January.  Patient has history of flap reconstruction x2 to this area.    4/23/2025: Wound remains resolved, though at high risk for reoccurrence  - Continue to protect area with pressure relieving  sacral foam dressing.  -Patient does spend a lot of time up in his wheelchair, and wishes to continue to do so for his quality of life.  He lives independently, drives, and is involved with family activities.    -His has a new cushion in his wheelchair.  Has yet to pick out a new wheelchair  - Known OM that was previously treated. CT scan done during recent hospitalization did not show OM of sacrum, however OM of the ischium noted.  -Patient is very well versed in pressure relief strategies  -Monitor site each clinic visit    Wound care: silicone adhesive foam dressing    2. Pressure injury of right ischium, stage 4 (HCC)  Comments: Abscess and OM found on CT during hospitalization in December 2023.  Patient underwent I&D with VAC placement.  IV antibiotics through 1/22/2024.    4/23/2025: Wound measuring larger today. No evidence infection.  - Excisional debridement of nonviable tissue from wound in clinic today, medically necessary to promote wound healing  - VAC discontinued previously.  -Patient to return to clinic weekly for assessment, debridement, and dressing change.    -Home health to change dressing 2 times per week in between clinic visits.    -Patient is very well versed on pressure relief measures, has adequate surface on bed, alternating low air loss.  - Continue enluxtra with backing removed for better drainage control.    Wound care: Hydrofera Blue Classic, enluxtra, Silicone foam    3. Pressure injury of left ischium, stage 4 (HCC)    4/23/2025: Wound measuring larger today. No evidence of infection.  - Excisional debridement of wound in clinic today, medically necessary to promote wound healing.  - Patient to return to clinic weekly for assessment, debridement, and dressing change  -VAC has been discontinued  -Home health to change dressing 2 times per week in between clinic visits.    -Patient has new cushion in his wheelchair, see above  -Patient is very well versed on pressure relief measures,  has adequate surface on bed, alternating low air loss.    Wound care: Hydrofera Blue Classic, enluxtra, Silicone foam    4. Other acute osteomyelitis, other site ([facility])    4/23/2025: Bone fragments removed during clinic visit in June were sent for pathology, polymicrobial, most concerning was an MDR ESBL Proteus.   -Patient completed IV antibiotics.  No further antibiotics at this time per ID  -Patient understands he has a very low threshold for infection/sepsis.  He understands he is to go to the emergency room immediately if he begins to experience fevers, chills, nausea or vomiting, or if he notices radiating erythema or purulent drainage from his wounds.    5. Quadriplegia, C5-C7 incomplete ([facility])  Comments: Complicating factor.  Impaired mobility and sensation  -Patient is still spending 7-8 hours/day up in his wheelchair and knows to reposition frequently.  Wears heel float boots bilaterally at all times  - Patient has new custom wheelchair seat. He had refitting and appears he was not sitting back in chair enough which was causing undue pressure to IT. He is more aware of the correct positioning in chair.    6.  Leakage from urinary catheter, subsequent encounter    4/23/2025:   - Repeated leakage  - Following with urology, performing Botox injections  -He discussed ileal conduit with urologist. They will be pursuing other therapies before considering.  - recently Dx with UTI, is currently on Ertapenem    Please note that this note may have been created using voice recognition software. I have worked with technical experts from [employer] to optimize the interface.  I have made every reasonable attempt to correct obvious errors, but there may be errors of grammar and possibly content that I did not discover before finalizing the note.

## 2025-04-23 NOTE — PROGRESS NOTES
Pt arrives to [facility] for daily antibiotic infusion.  Pt denies any new or acute changes.  LUE midline intact, flushes easily, no blood return observed.  Ertapenem infused as ordered, pt tolerated well.  Midline flushed with NS, clave changed,  wrapped in gauze and protective sleeve.  Pt discharged in NAD, next appointment confirmed.

## 2025-04-23 NOTE — PROGRESS NOTES
INFECTIOUS  DISEASE  OUTPATIENT CLINIC  NOTE   Subjective   Primary care provider: KATE Phillips.     Reason for Follow Up:   Follow-up for   1. Frequent UTI        2. Multiple drug resistant organism (MDRO) culture positive        3. Urinary tract infection associated with catheterization of urinary tract, unspecified indwelling urinary catheter type, subsequent encounter        4. Suprapubic catheter (HCC)        5. Neurogenic urinary bladder disorder        6. Pressure ulcers of skin of multiple topographic sites        7. Quadriplegia, C5-C7 complete (HCC)          HPI: Referred back to ID clinic, known to ID team for previous Ischial osteomyelitis with abscess   Osvaldo Pate is a 73 y.o. 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   6 weeks of IV antibiotics for sacral osteomyelitis in 3/23/22. He was hospitalized from 4/22 until 4/27/2022 and underwent surgery with Dr. Raman on 4/26 for irrigation and debridement of multiple compartments of the left lower extremity, bone excision, and complex closure of chronic wound using biologic skin substitute.  Patient also with history of ESBL infection in urine noted in 4/2023. s/p debridement necrotic muscle and bone 12/12/23. Operative cultures 12/12/23 ESBL Proteus (also fluoroquinolone and Bactrim resistant), pan susceptible Enterococcus, Prevotella. Completed 6 weeks of IV ertapenem  + IV daptomycin on 1/22/2024.  Patient continued to follow-up with wound care on a regular basis.  Most recently on 6/5/2024 patient followed up with wound care and was noted to have Left ischial wound with exposed bone and with few small bone fragments and some slough. He does report copious drainage from left ischial wound.  Wound team obtained  culture and also sent a small dislodged component of bone to pathology.  Pathology results positive for acute osteomyelitis, negative for malignancy. Cultures +  Proteus mirabilis esbl, Escherichia coli (pan sensitivity), and group G strep.  Referred to ID clinic for antibiotic therapy.  Completed 6-week course of IV ertapenem which ended on 8/7/2024.     Patient referred back to ID by urology.  Patient follow-up with urology on 8/29/24 and 9/9/2024 due to chronic suprapubic catheter.  Suprapubic catheter was exchanged in office as catheter was not draining and there was complete blockage.  History of neurogenic bladder with frequent UTIs (MRSA, Pseudomonas) and kidney stones.  It was reported it was difficult to know if patient was truly symptomatic due to history of paraplegia.  Due to urine retention urine sample was sent to lab during catheter exchange.  Urine culture positive for ESBL Proteus Mirabilis.  Urine dip positive for blood and positive for nitrates and leukocytes.  Outside medical records reviewed      9/18/24-patient referred back to [facility] due to positive urine culture for ESBL Proteus which is grown in other urine cultures in the past.  Given patient has chronic suprapubic catheter most likely colonized.  Multiple urine cultures since 2023 have been positive for the same bacteria. At the time of visit the urine culture is nearly 20 days old and today in clinic patient is not showing any signs or symptoms of infection.  Vital signs stable, he denies any recent fevers, chills, body shakes.  Due to his history of paraplegia it is difficult to diagnose with dysuria or bladder spasm.  There is sediment in his Cazares catheter drainage bag with no blood and clear yellow urine.  Patient states he has been on chronic Methenamine Hippurate 1g BID. Methenamine is a drug that stops the growth of bacteria in urine. This medication also contains an ingredient that helps to make the urine acidic.  Given that  patient is asymptomatic today we will hold off on starting IV antibiotic therapy.  Repeat labs CBC plus differential for evaluation.  If leukocytosis then may possibly start treatment.  ED precautions discussed.      1/15/25- patient referred back to ID clinic for reoccurrence of UTI. Difficult to truly know if patient is symptomatic due to history of paraplegia.  Urology with routine testing for UTI.  UA in all urology office indication of infection.  Positive for leukocytes and blood.  12/23 urine culture positive for Pseudomonas aeruginosa and was referred to ID clinic..  ER on 1/9/2025 with increasing chest discomfort. Urine culture from 1/9/2025 negative.  Given that patient's urine culture was negative in the ED, urine today clear yellow with no sediment, asymptomatic, and most recent labs on ED admission and at time of discharge with no leukocytosis.  Most likely urine culture was contaminant.  Labs from 1/11/2025 WBC 5.3, neutrophils WNL, mild thrombocytopenia 149, CMP mostly unremarkable.  Patient has been on chronic methenamine Hipprex 1 gr BID.  No signs or symptoms of infection today.  Denies any fever, chills, nausea, vomiting constipation or diarrhea.  Continues to see wound care on a regular basis.  No patient's for antibiotics today.     4/16/25-  Called and discussed patient's symptoms via telephone.  He was most recently in the ED on 4/12/25 with fever and increasing lower abdomen  pressure.  Urinalysis in ED positive for nitrates, leukocytes and WBCs.  Urine culture positive for Proteus mirabilis that is multidrug-resistant with no oral options available.  Discussed results with patient via telephone results he still having mild symptoms despite Keflex that the ED had ordered.  Recommend we start 10-day course of IV meropenem.  Patient would like to attempt home IV infusions first.  If cost is prohibiting treatment then he will proceed forward to [facility].  Stat midline  "order placed.  Written orders for IV ertapenem 1 g daily, 10-day course, tentative start date 4/18/25, end date 4/28/2025.  Possibly change in date based upon start date of antibiotic therapy.  Medication education provided.  ED precautions discussed.  Follow-up with ID clinic in 1 week. Orders placed for patient to go to [facility] outpatient infusion center for once a day ertapenem 1 g.  10-day course with a tentative start date on 4/19/2025 but possibly may change based upon outpatient therapy schedule.  Pending midline placement     4/23/25-patient following up while on IV ertapenem 1 g daily end date of 4/29/2025.  Patient reports he is tolerating antibiotic with no complaints of side effects.  Today he denies any nausea, vomiting, fever, chills, constipation, diarrhea.  Continues to have suprapubic Cazares catheter for neurogenic bladder.  Reports that Cazares catheter was previously changed during ED admission.  We discussed there is a low possibility that current UTI may have infected and Cazares catheter.  Given that the patient was started on antibiotic therapy during his ED admission we will decline on replacing suprapubic catheter at this time.  Most recent labs reviewed from 4/21/2025 WBC 6.0, stable anemia slowly improving 13.7/40.5, platelets 2 8, neutrophils WNL , CMP unremarkable.  No signs of medication toxicity.  He continues to follow with wound care basis.  His right ischial wound is measuring larger continues to have heavy drainage but no signs of active infection.       states he cannot have MRI because he has, \"ferrous metal\" in his body, placed in the 1970s.   Current Antimicrobials: IV ertapenem 1 g daily, 10-day course, end date 4/29/25  Previous Antimicrobials:    Other Current Medications:  Home Medications    Medication Sig Taking? Last Dose Authorizing Provider   methenamine hip (HIPPREX) 1 GM Tab Take 1 g by mouth 2 times a day. Yes Taking Physician Outpatient   amLODIPine (NORVASC) 2.5 MG Tab " Take 2.5 mg by mouth 1 time a day as needed. If  MMHG and Above Yes Taking Physician Outpatient   omeprazole (PRILOSEC) 20 MG delayed-release capsule Take 20 mg by mouth 2 times a day. Yes Taking Physician Outpatient   losartan (COZAAR) 50 MG Tab Take 1 Tablet by mouth every day. Yes Taking Wallace Moyer, A.P.R.N.   polyethylene glycol 3350 (MIRALAX) 17 GM/SCOOP Powder Take 17 g by mouth every day. May also take 17 g 1 time a day as needed (constipation). Provide an extra dose of miralax daily as needed for constipation or hard stools. Yes Taking Wallace Moyer, A.P.R.N.   Sodium Hypochlorite (DAKINS 0.125%, 1/4 STRENGTH,) 0.125 % Solution Dampen gauze with product and apply to wound bed, allow soak for 10 minutes prior to applying wound VAC. Use 3x weekly with VAC changes.  Patient taking differently: Apply  topically every Monday, Wednesday, and Friday. Dampen gauze with product and apply to wound bed, allow soak for 10 minutes prior to applying wound VAC. Use 3x weekly with VAC changes. Yes Taking Steve Hodges M.D.   solifenacin (VESICARE) 10 MG tablet Take 10 mg by mouth every evening. Yes Taking Physician Outpatient   baclofen (LIORESAL) 20 MG tablet TAKE 1 TABLET BY MOUTH 4 TIMES DAILY Yes Taking Wallace Moyer, A.P.R.N.   NON SPECIFIED Take 3 Capsules by mouth every day. Balance of Nature-Whole Produce FRUITS-patient to provide supply Yes Taking Physician Outpatient   NON SPECIFIED Take 3 Capsules by mouth every day. Balance of Nature-Whole Produce VEGGIES-patient to provide supply Yes Taking Physician Outpatient   amLODIPine (NORVASC) 5 MG Tab Take 1 Tablet by mouth as needed (Per MAR if blood pressure if less than 130/80). Yes Taking Wallace Moyer, A.P.R.N.   docusate sodium (COLACE) 100 MG Cap Take 2 Capsules by mouth 2 times a day. Yes Taking Lex Meier M.D.   Simethicone (GAS-X PO) Take 1 Tablet by mouth as needed (For gas). Yes Taking Physician  "Outpatient   acetaminophen (TYLENOL) 500 MG Tab Take 1,000 mg by mouth every four hours as needed for Moderate Pain. Yes PRN Physician Outpatient   diclofenac sodium (VOLTAREN) 1 % Gel Apply 1 g topically 4 times a day. Apply to both elbows Yes Taking ANITA PrettyP.RDenizN.   aspirin EC 81 MG EC tablet Take 1 Tablet by mouth every day. Yes Taking ANITA RussP.REKTA   Zinc 50 MG Tab Take 1 Tablet by mouth every morning. Yes Taking Physician Outpatient   Cholecalciferol (VITAMIN D3) 2000 UNIT Cap Take 1 Capsule by mouth every morning. Yes Taking Physician Outpatient   Magnesium 400 MG Tab Take 400 mg by mouth every morning. Yes Taking Physician Outpatient   Ascorbic Acid (VITAMIN C) 1000 MG Tab Take 1 Tablet by mouth every morning. Yes Taking Physician Outpatient   melatonin 5 mg Tab Take 10 mg by mouth at bedtime. Gummie Yes Taking Physician Outpatient   Probiotic Product (PROBIOTIC PO) Take 1 Capsule by mouth every morning. Yes Taking Physician Outpatient   sennosides (SENOKOT) 8.6 MG Tab Take 17.2 mg by mouth 2 times a day. Yes Taking Physician Outpatient   ferrous sulfate 325 (65 Fe) MG tablet Take 1 Tablet by mouth 2 times a day. Yes Taking Physician Outpatient        PMH:  Past Medical History:   Diagnosis Date    A-fib (Formerly [facility])     Acute cystitis without hematuria 10/23/2021    Acute UTI 06/18/2023    Arrhythmia     Atrial flutter (Formerly [facility])     Blood clotting disorder (Formerly [facility])     Patient is on Xarelto    Bowel habit changes     Colostomy    Clot hematuria 01/23/2023    GERD (gastroesophageal reflux disease)     Hypertension     Hypokalemia 06/18/2023    Kidney stones     Metabolic acidosis 06/18/2023    Neurogenic bladder     S/P cath    Open wound 11/18/2022    sacrum with wound vac, left ankle, [facility]    Quadriplegia, C5-C7 complete (Formerly [facility])     patient reports \" incomplete quad\"    Sepsis secondary to UTI (Formerly [facility]) 06/17/2023    SIRS (systemic inflammatory response syndrome) (Formerly [facility]) 12/12/2023    " Suprapubic catheter (HCC)      Past Surgical History:   Procedure Laterality Date    WOUND IRRIGATION & DEBRIDEMENT Right 12/12/2023    Procedure: IRRIGATION AND DEBRIDEMENT, WOUND/BUTTOCKS;  Surgeon: Huan Bansal M.D.;  [place] [facility]  Service: General    WOUND IRRIGATION & DEBRIDEMENT Left 04/26/2022    Procedure: IRRIGATION AND DEBRIDEMENT, WOUND - LEG;  Surgeon: Eric Raman M.D.;  Location: [facility];  Service: Orthopedics    WOUND CLOSURE Left 04/26/2022    Procedure: CLOSURE, WOUND;  Surgeon: Eric Raman M.D.;  Location: [facility];  Service: Orthopedics    ORTHOPEDIC OSTEOTOMY Left 04/26/2022    Procedure: OSTECTOMY;  Surgeon: Eric Raman M.D.;  Location: [facility];  Service: Orthopedics    INCISION AND DRAINAGE ORTHOPEDIC Left 01/27/2022    Procedure: INCISION AND DRAINAGE, WOUND, BY ORTHOPEDICS;  Surgeon: Erasmo Stewart M.D.;  Location: [facility];  Service: Orthopedics    BONE BIOPSY Left 01/27/2022    Procedure: BIOPSY, BONE;  Surgeon: Erasmo Stewart M.D.;  Location: [facility];  Service: Orthopedics    INCISION AND DRAINAGE ORTHOPEDIC  01/22/2022    Procedure: INCISION AND DRAINAGE, WOUND, BY ORTHOPEDICS;  Surgeon: Erasom Stewart M.D.;  Location: [facility];  Service: Orthopedics    KS CYSTOSCOPY,INSERT URETERAL STENT Left 01/04/2022    Procedure: CYSTOSCOPY, WITH URETERAL STENT INSERTION;  Surgeon: Osvaldo Baltazar M.D.;  Location: [facility];  Service: Urology    KS CYSTO/URETERO/PYELOSCOPY, DX Left 01/04/2022    Procedure: URETEROSCOPY;  Surgeon: Osvaldo Baltazar M.D.;  Location: [facility];  Service: Urology    LASERTRIPSY N/A 01/04/2022    Procedure: LITHOTRIPSY, USING LASER;  Surgeon: Osvaldo Baltazar M.D.;  Location: [facility];  Service: Urology    KS CYSTOSCOPY,INSERT URETERAL STENT Left 12/16/2021    Procedure: CYSTOSCOPY, WITH URETERAL STENT INSERTION;   Surgeon: Aly Bowen M.D.;  [facility]: [facility];  Service: Urology    NC CYSTO/URETERO/PYELOSCOPY, DX Left 2021    Procedure: URETEROSCOPY;  Surgeon: Aly Bowen M.D.;  Location: [facility];  Service: Urology    LASERTRIPSY Left 2021    Procedure: LITHOTRIPSY, USING LASER;  Surgeon: Aly Bowen M.D.;  Location: [facility];  Service: Urology    WOUND IRRIGATION & DEBRIDEMENT  2020    Procedure: IRRIGATION AND DEBRIDEMENT, WOUND SACRAL ULCER;  Surgeon: Matt Cummins M.D.;  Location: [facility];  Service: Plastics    ULCER DEBRIDEMENT N/A 2019    Procedure: debridement of Sacral grade 4 ulcer - W/BONE BIOPSY, 3 liter wash out. bilateral sliding gluteal myocutaneous flap advancement;  Surgeon: Amadeo Moon M.D.;  Location: [facility];  Service: Plastics    FLAP CLOSURE  2019    Procedure: CLOSURE, FLAP - MUSCLE;  Surgeon: Amadeo Moon M.D.;  Location: [facility];  Service: Plastics    COLOSTOMY N/A 2019    Procedure: CREATION, COLOSTOMY -  placement;  Surgeon: Elías Hannah M.D.;  Location: [facility];  Service: General    COLOSTOMY      COLOSTOMY TAKEDOWN      HERNIA REPAIR      ORIF, ANKLE      PERCUTANEOUOSPINNING LOWER EXTREMITY       Family History   Problem Relation Age of Onset    Heart Disease Father      Social History     Socioeconomic History    Marital status:      Spouse name: Not on file    Number of children: Not on file    Years of education: Not on file    Highest education level: Not on file   Occupational History    Not on file   Tobacco Use    Smoking status: Former     Current packs/day: 0.00     Average packs/day: 1 pack/day for 10.0 years (10.0 ttl pk-yrs)     Types: Cigarettes     Start date: 1967     Quit date: 1977     Years since quittin.3    Smokeless tobacco: Never   Vaping Use    Vaping status: Never Used  "  Substance and Sexual Activity    Alcohol use: Yes     Alcohol/week: 4.2 oz     Types: 7 Standard drinks or equivalent per week     Comment: 2/day    Drug use: No    Sexual activity: Not on file   Other Topics Concern    Not on file   Social History Narrative    Not on file     Social Drivers of Health     Financial Resource Strain: Not on file   Food Insecurity: No Food Insecurity (1/9/2025)    Hunger Vital Sign     Worried About Running Out of Food in the Last Year: Never true     Ran Out of Food in the Last Year: Never true   Transportation Needs: No Transportation Needs (1/9/2025)    PRAPARE - Transportation     Lack of Transportation (Medical): No     Lack of Transportation (Non-Medical): No   Physical Activity: Not on file   Stress: Not on file   Social Connections: Not on file   Intimate Partner Violence: Not At Risk (1/9/2025)    Humiliation, Afraid, Rape, and Kick questionnaire     Fear of Current or Ex-Partner: No     Emotionally Abused: No     Physically Abused: No     Sexually Abused: No   Housing Stability: Low Risk  (1/9/2025)    Housing Stability Vital Sign     Unable to Pay for Housing in the Last Year: No     Number of Times Moved in the Last Year: 0     Homeless in the Last Year: No           Allergies/Intolerances:  Allergies   Allergen Reactions    Sulfa Drugs Rash     Rash, developed this back in 2015 after being placed on \"sulfa antibiotic for my wound\". Antibiotic was stopped and rash went away. Patient states he had a sulfa antibiotic prior to that time back when he was younger w/o a reaction.          ROS:   Review of Systems   Constitutional:  Negative for chills, fever, malaise/fatigue and weight loss.   HENT:  Negative for congestion and hearing loss.    Eyes:  Negative for blurred vision and double vision.   Respiratory:  Negative for cough, sputum production, shortness of breath and wheezing.    Cardiovascular:  Negative for chest pain and palpitations.   Gastrointestinal:  Negative " "for abdominal pain, constipation, diarrhea, nausea and vomiting.   Genitourinary:  Negative for dysuria.        Cazares catheter   Musculoskeletal:  Negative for myalgias.   Skin:  Negative for itching and rash.   Neurological:  Positive for focal weakness (Paraplegia). Negative for dizziness and headaches.   Endo/Heme/Allergies:  Does not bruise/bleed easily.   Psychiatric/Behavioral:  The patient is not nervous/anxious.       ROS was reviewed and were negative except as above.    Objective    Most Recent Vital Signs:  /70 (BP Location: Right arm)   Pulse (!) 50   Temp 36.2 °C (97.2 °F)   Resp (!) 10   Ht 1.778 m (5' 10\")   Wt 97.5 kg (215 lb)   SpO2 96%   BMI 30.85 kg/m²     Physical Exam:  Physical Exam  Vitals reviewed.   Constitutional:       Appearance: Normal appearance. He is obese.      Comments: Wheelchair   HENT:      Head: Normocephalic and atraumatic.      Nose: Nose normal.      Mouth/Throat:      Mouth: Mucous membranes are moist.   Eyes:      Pupils: Pupils are equal, round, and reactive to light.   Cardiovascular:      Rate and Rhythm: Normal rate.   Pulmonary:      Effort: Pulmonary effort is normal.      Breath sounds: Normal breath sounds.   Abdominal:      Palpations: Abdomen is soft.   Genitourinary:     Comments: Suprapubic Cazares catheter  Clear yellow urine in drainage bag  Musculoskeletal:         General: No tenderness.      Cervical back: Normal range of motion and neck supple.      Comments: pressure injury to right ischium-wound.   pressure injury of left ischium-wound.   See wound team note   Skin:     General: Skin is warm and dry.      Coloration: Skin is not jaundiced.      Findings: No erythema or rash.   Neurological:      Mental Status: He is alert and oriented to person, place, and time.      Motor: Weakness present.   Psychiatric:         Mood and Affect: Mood normal.         Behavior: Behavior normal.          Pertinent Lab/Imaging Results:  [unfilled]  @CMP@  WBC "   Date/Time Value Ref Range Status   04/21/2025 05:16 PM 6.0 4.8 - 10.8 K/uL Final     RBC   Date/Time Value Ref Range Status   04/21/2025 05:16 PM 4.02 (L) 4.70 - 6.10 M/uL Final     Hemoglobin   Date/Time Value Ref Range Status   04/21/2025 05:16 PM 13.7 (L) 14.0 - 18.0 g/dL Final     Hematocrit   Date/Time Value Ref Range Status   04/21/2025 05:16 PM 40.5 (L) 42.0 - 52.0 % Final     MCV   Date/Time Value Ref Range Status   04/21/2025 05:16 .7 (H) 81.4 - 97.8 fL Final     MCH   Date/Time Value Ref Range Status   04/21/2025 05:16 PM 34.1 (H) 27.0 - 33.0 pg Final     MCHC   Date/Time Value Ref Range Status   04/21/2025 05:16 PM 33.8 32.3 - 36.5 g/dL Final     MPV   Date/Time Value Ref Range Status   04/21/2025 05:16 PM 8.7 (L) 9.0 - 12.9 fL Final      Sodium   Date/Time Value Ref Range Status   04/21/2025 05:16  135 - 145 mmol/L Final     Potassium   Date/Time Value Ref Range Status   04/21/2025 05:16 PM 4.2 3.6 - 5.5 mmol/L Final     Comment:     The hemolysis index of the specimen exceeds the allowed tolerance for the  test. Result may be affected.?Specimen recollection is recommended to  confirm the result.       Chloride   Date/Time Value Ref Range Status   04/21/2025 05:16  96 - 112 mmol/L Final     Co2   Date/Time Value Ref Range Status   04/21/2025 05:16 PM 22 20 - 33 mmol/L Final     Glucose   Date/Time Value Ref Range Status   04/21/2025 05:16 PM 98 65 - 99 mg/dL Final     Bun   Date/Time Value Ref Range Status   04/21/2025 05:16 PM 20 8 - 22 mg/dL Final     Creatinine   Date/Time Value Ref Range Status   04/21/2025 05:16 PM 0.66 0.50 - 1.40 mg/dL Final     Bun-Creatinine Ratio   Date/Time Value Ref Range Status   01/22/2024 06:47 AM 45.0 (H) 10.0 - 20.0 Final     Alkaline Phosphatase   Date/Time Value Ref Range Status   04/21/2025 05:16 PM 77 30 - 99 U/L Final     AST(SGOT)   Date/Time Value Ref Range Status   04/21/2025 05:16 PM 16 12 - 45 U/L Final     ALT(SGPT)   Date/Time Value Ref  "Range Status   04/21/2025 05:16 PM 8 2 - 50 U/L Final     Total Bilirubin   Date/Time Value Ref Range Status   04/21/2025 05:16 PM 0.4 0.1 - 1.5 mg/dL Final      CPK Total   Date/Time Value Ref Range Status   12/20/2023 03:45 AM 52 0 - 154 U/L Final      Recent Labs     04/21/25  1716   ASTSGOT 16   ALTSGPT 8   TBILIRUBIN 0.4   ALKPHOSPHAT 77   GLOBULIN 4.1*       Blood Culture Hold   Date Value Ref Range Status   04/15/2023 Collected  Final       Blood Culture Hold   Date Value Ref Range Status   04/15/2023 Collected  Final       SURGICAL PATHOLOGY CONSULTATION      FINAL DIAGNOSIS:    A. Left ischium bone:         Bone fragment with degenerative changes and focal acute          osteomyelitis         Negative for malignancy                                        Diagnosis performed by:                                      ARMAND CASAREZ DO  ------------------------------------------------------------------------  ---------------------------------------------------------------  CODES: 2002x1  2205x1    SPECIMEN(S)  A. Left ischium bone:    PERTINENT CLINICAL INFORMATION:  Infected wound (T14.8 XXA, L8.9)     GROSS DESCRIPTION:  A. Received in formalin labeled with the patient's name, medical record  number, and \"left ischium\" are 2 irregular calcified fragments of  tissue measuring 0.2-0.7 cm.  Decalcified in HCl.  1 /[id number] [facility]    MICROSCOPIC DESCRIPTION:  Microscopic examination was performed.  Please see diagnosis.    Report electronically signed by:  ARMAND CASAREZ DO ,  Diagnosis performed at: [facility]  Pathology  Department, [address] [place] [address]  [id number]      Printed 00/00/0000  [id number] NICHOLAS CASEY   Page 1 of 1 MRN#:[id number]      Specimen Collected: 06/05/24  1:35 PM Last Resulted: 06/07/24 10:02 AM      ntains abnormal data CULTURE TISSUE W/ GRM STAIN  Order: [id number]   Status: Final result       Visible to patient: Yes (seen)       Next appt: Today at 04:30 PM in " Infectious Diseases (Tin Ascencio ADenizP.R.NDeniz)       Dx: Infected wound    Specimen Information: Bone   0 Result Notes      Component 2 wk ago   Significant Indicator POS Positive (POS)   Source BONE   Site LEFT ISCHIUM   Culture Result - Abnormal    Gram Stain Result Few Gram negative rods.   Culture Result  Abnormal   Escherichia coli  Moderate growth    Culture Result  Abnormal   Proteus mirabilis ESBL  Moderate growth  Extended Spectrum Beta-lactamase (ESBL) isolated.  ESBL's may be clinically resistant to therapy with  Penicillins,Cephalosporins or Aztreonam despite  apparent in vitro susceptibility to some of these agents.  The patient requires contact isolation.  Please contact pharmacy or an Infectious Disease Specialist  if you have any questions about appropriate therapy.    Culture Result  Abnormal   Beta Streptococcus Group G  Light growth    Resulting Agency M        Susceptibility     Escherichia coli Proteus mirabilis esbl     ROSE ROSE     Amoxicillin/CA <=8/4 mcg/mL Sensitive       Ampicillin <=8 mcg/mL Sensitive >16 mcg/mL Resistant     Ampicillin/sulbactam <=4/2 mcg/mL Sensitive 8/4 mcg/mL Sensitive     Cefazolin <=2 mcg/mL Sensitive >16 mcg/mL Resistant     Cefepime <=2 mcg/mL Sensitive >16 mcg/mL Resistant     Ceftriaxone <=1 mcg/mL Sensitive >32 mcg/mL Resistant     Cefuroxime <=4 mcg/mL Sensitive >16 mcg/mL Resistant     Ciprofloxacin <=0.25 mcg/mL Sensitive 1 mcg/mL Resistant     Ertapenem <=0.5 mcg/mL Sensitive <=0.5 mcg/mL Sensitive     Gentamicin <=2 mcg/mL Sensitive <=2 mcg/mL Sensitive     Levofloxacin <=0.5 mcg/mL Sensitive 1 mcg/mL Intermediate     Minocycline <=4 mcg/mL Sensitive >8 mcg/mL Resistant     Moxifloxacin <=2 mcg/mL Sensitive >4 mcg/mL Resistant     Pip/Tazobactam <=8 mcg/mL Sensitive <=8 mcg/mL Sensitive     Tigecycline <=2 mcg/mL Sensitive       Tobramycin <=2 mcg/mL Sensitive <=2 mcg/mL Sensitive     Trimeth/Sulfa <=0.5/9.5 m... Sensitive >2/38 mcg/mL Resistant                     Narrative  Performed by: M  Left Ischium      Specimen Collected: 06/05/24  1:35 PM Last Resulted: 06/08/24  2:41 PM                          URINE CULTURE(NEW)  Order: [id number]   Status: Final result       Visible to patient: Yes (seen)       Next appt: Today at 03:00 PM in Wound Care (Steve Hodges M.D.)    Specimen Information: Urine   0 Result Notes      Component 3 wk ago   Significant Indicator POS Positive (POS)   Source UR   Site -   Culture Result - Abnormal    Culture Result  Abnormal   Pseudomonas aeruginosa  >100,000 cfu/mL    Resulting Agency M        Susceptibility     Pseudomonas aeruginosa     ROSE     Amikacin <=16 mcg/mL Sensitive     Cefepime <=2 mcg/mL Sensitive     Ceftazidime 4 mcg/mL Sensitive     Ciprofloxacin <=0.25 mcg/mL Sensitive     Levofloxacin <=0.5 mcg/mL Sensitive     Meropenem <=1 mcg/mL Sensitive     Pip/Tazobactam <=8 mcg/mL Sensitive                 Specimen Collected: 12/23/24  4:45 PM Last Resulted: 12/26/24  7:32 AM         ntains abnormal data URINE CULTURE(NEW)  Order: [id number]   Status: Final result       Next appt: Today at 03:30 PM in Wound Care (Steve Hodges M.D.)    Test Result Released: Yes (not seen)    Specimen Information: Urine, Suprapubic   0 Result Notes      Component  Ref Range & Units (hover) 4 d ago   Significant Indicator POS Positive (POS)   Source UR   Site -   Culture Result Usual skin ade 10-50,000 cfu/mL Abnormal    Culture Result  Abnormal   Proteus mirabilis  ,000 cfu/mL    Resulting Agency M        Susceptibility     Proteus mirabilis     ROSE     Amoxicillin/Clavulanic Acid >16/8 mcg/mL Resistant     Ampicillin >16 mcg/mL Resistant     Ampicillin/sulbactam >16/8 mcg/mL Resistant     Cefazolin >16 mcg/mL Resistant 1     Cefepime >16 mcg/mL Resistant     Ceftriaxone >32 mcg/mL Resistant     Cefuroxime >16 mcg/mL Resistant     Ciprofloxacin 2 mcg/mL Resistant     Gentamicin <=2 mcg/mL Sensitive     Levofloxacin 2 mcg/mL Resistant      Meropenem <=1 mcg/mL Sensitive     Meropenem/Vaborbactam <=2 mcg/mL Sensitive     Minocycline >8 mcg/mL Resistant     Nitrofurantoin 64 mcg/mL Resistant     Pip/Tazobactam <=8 mcg/mL Sensitive     Tobramycin <=2 mcg/mL Sensitive     Trimeth/Sulfa >2/38 mcg/mL Resistant              1 Breakpoints when Cefazolin is used for therapy of infections  other than uncomplicated UTIs due to Enterobacterales are as  follows:  ROSE and Interpretation:  <=2 S  4 I  >=8 R            Specimen Collected: 04/12/25  9:45 PM Last Resulted: 04/15/25  8:39 A           Impression/Assessment      1. Frequent UTI        2. Multiple drug resistant organism (MDRO) culture positive        3. Urinary tract infection associated with catheterization of urinary tract, unspecified indwelling urinary catheter type, subsequent encounter        4. Suprapubic catheter (HCC)        5. Neurogenic urinary bladder disorder        6. Pressure ulcers of skin of multiple topographic sites        7. Quadriplegia, C5-C7 complete (HCC)        Osvaldo Pate is a 73 y.o. 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  6 weeks of IV antibiotics for sacral osteomyelitis in 3/23/22. He was hospitalized from 4/22 until 4/27/2022 and underwent surgery with Dr. Raman on 4/26 for irrigation and debridement of multiple compartments of the left lower extremity, bone excision, and complex closure of chronic wound using biologic skin substitute.  History of multiple UTIs with increasing resistance.   History of neurogenic bladder with frequent UTIs (MRSA, Pseudomonas) and kidney stones.  It was reported it was difficult to know if patient was truly symptomatic due to history of paraplegia. ED admission 4/12 with CO of UTI.  Urinalysis in ED positive for  nitrates, leukocytes and WBCs.  Urine culture positive for Proteus mirabilis that is multidrug-resistant with no oral options available. IV ertapenem 1 g daily, 10-day course    4/23/25-patient following up while on IV ertapenem 1 g daily end date of 4/29/2025.  Patient reports he is tolerating antibiotic with no complaints of side effects.  Today he denies any nausea, vomiting, fever, chills, constipation, diarrhea.  Continues to have suprapubic Cazares catheter for neurogenic bladder.  Reports that Cazares catheter was previously changed during ED admission.  We discussed there is a low possibility that current UTI may have infected and Cazares catheter.  Given that the patient was started on antibiotic therapy during his ED admission we will decline on replacing suprapubic catheter at this time.  Most recent labs reviewed from 4/21/2025 WBC 6.0, stable anemia slowly improving 13.7/40.5, platelets 2 8, neutrophils WNL , CMP unremarkable.  No signs of medication toxicity.  He continues to follow with wound care basis.  His right ischial wound is measuring larger continues to have heavy drainage but no signs of active infection.   PLAN:   - Continue 10-day course of IV ertapenem end date of 4/29/2025.  Okay to remove midline after last IV infusion.  - Repeat labs CBC/CMP prior to completion of IV antibiotic therapy with close continuous monitoring.  - Continue follow-up with wound care/PCP/neurology  - Education provided on S/S of infection and when to report to ER/ call [phone]       Return visit: PRN  . Follow up with primary care physician for chronic medical problems      I have performed a physical exam,  updated ROS and plan today. I have reviewed previous images, labs, and provider notes.      WESTON Pennington.    All Patients should seek medical re-evaluation or report to the ER for new, increasing or worsening symptoms. In some circumstances medical conditions can change from the initial evaluation and may  require emergent medical re-evaluation. This includes but is not limited to chest pain, shortness of breath, atypical abdominal pain, atypical headache, ALOC, fever >101, low blood pressure, high respiratory rate (above 30), low oxygen saturation (below 90%), acute delirium, abnormal bleeding, inability to tolerate any intake, weakness on one side of the body, any worsened or concerning conditions.    Please note that this dictation was created using voice recognition software. I have worked with technical experts from [facility] to optimize the interface.  I have made every reasonable attempt to correct obvious errors, but there may be errors of grammar and possibly content that I did not discover before finalizing the note.

## 2025-04-24 ENCOUNTER — OUTPATIENT INFUSION SERVICES (OUTPATIENT)
Dept: ONCOLOGY | Facility: MEDICAL CENTER | Age: 75
End: 2025-04-24
Attending: NURSE PRACTITIONER
Payer: MEDICARE

## 2025-04-24 VITALS
OXYGEN SATURATION: 95 % | DIASTOLIC BLOOD PRESSURE: 84 MMHG | TEMPERATURE: 98.3 F | RESPIRATION RATE: 18 BRPM | HEART RATE: 53 BPM | SYSTOLIC BLOOD PRESSURE: 169 MMHG

## 2025-04-24 DIAGNOSIS — N39.0 RECURRENT UTI (URINARY TRACT INFECTION): ICD-10-CM

## 2025-04-24 PROCEDURE — 96365 THER/PROPH/DIAG IV INF INIT: CPT

## 2025-04-24 PROCEDURE — 700111 HCHG RX REV CODE 636 W/ 250 OVERRIDE (IP): Mod: JZ | Performed by: NURSE PRACTITIONER

## 2025-04-24 PROCEDURE — 700105 HCHG RX REV CODE 258: Performed by: NURSE PRACTITIONER

## 2025-04-24 RX ORDER — 0.9 % SODIUM CHLORIDE 0.9 %
VIAL (ML) INJECTION PRN
Status: CANCELLED | OUTPATIENT
Start: 2025-04-25

## 2025-04-24 RX ORDER — 0.9 % SODIUM CHLORIDE 0.9 %
10 VIAL (ML) INJECTION PRN
Status: CANCELLED | OUTPATIENT
Start: 2025-04-25

## 2025-04-24 RX ORDER — 0.9 % SODIUM CHLORIDE 0.9 %
3 VIAL (ML) INJECTION PRN
Status: CANCELLED | OUTPATIENT
Start: 2025-04-25

## 2025-04-24 RX ADMIN — ERTAPENEM SODIUM 1000 MG: 1 INJECTION, POWDER, LYOPHILIZED, FOR SOLUTION INTRAMUSCULAR; INTRAVENOUS at 17:17

## 2025-04-24 NOTE — PROGRESS NOTES
Pt came into Infusion independently . Orders and vitals reviewed, assessment done. Midline accessed with no blood return noted. Invanz infused as ordered with no adverse events. MIdline flushed and saline locked post infusion. Pt left in stable condition with next appointment confirmed.

## 2025-04-25 ENCOUNTER — OUTPATIENT INFUSION SERVICES (OUTPATIENT)
Dept: ONCOLOGY | Facility: MEDICAL CENTER | Age: 75
End: 2025-04-25
Attending: NURSE PRACTITIONER
Payer: MEDICARE

## 2025-04-25 VITALS
DIASTOLIC BLOOD PRESSURE: 59 MMHG | SYSTOLIC BLOOD PRESSURE: 121 MMHG | OXYGEN SATURATION: 92 % | RESPIRATION RATE: 18 BRPM | HEART RATE: 51 BPM | TEMPERATURE: 97.6 F

## 2025-04-25 DIAGNOSIS — N39.0 RECURRENT UTI (URINARY TRACT INFECTION): ICD-10-CM

## 2025-04-25 PROCEDURE — 700111 HCHG RX REV CODE 636 W/ 250 OVERRIDE (IP): Mod: JZ | Performed by: NURSE PRACTITIONER

## 2025-04-25 PROCEDURE — 96365 THER/PROPH/DIAG IV INF INIT: CPT

## 2025-04-25 PROCEDURE — 700105 HCHG RX REV CODE 258: Performed by: NURSE PRACTITIONER

## 2025-04-25 RX ORDER — 0.9 % SODIUM CHLORIDE 0.9 %
VIAL (ML) INJECTION PRN
Status: CANCELLED | OUTPATIENT
Start: 2025-04-26

## 2025-04-25 RX ORDER — 0.9 % SODIUM CHLORIDE 0.9 %
10 VIAL (ML) INJECTION PRN
Status: CANCELLED | OUTPATIENT
Start: 2025-04-26

## 2025-04-25 RX ORDER — 0.9 % SODIUM CHLORIDE 0.9 %
3 VIAL (ML) INJECTION PRN
Status: CANCELLED | OUTPATIENT
Start: 2025-04-26

## 2025-04-25 RX ADMIN — ERTAPENEM SODIUM 1000 MG: 1 INJECTION, POWDER, LYOPHILIZED, FOR SOLUTION INTRAMUSCULAR; INTRAVENOUS at 17:23

## 2025-04-25 NOTE — PROGRESS NOTES
Pt presented to Infusion Services for daily Invanz. Reported no significant changes from yesterday. Left arm midline present, flushed easily, no blood return observed. Invanz infused without s/sx of adverse reaction. Midline flushed, clamped and wrapped in gauze and protective sleeve. Pt will return tomorrow for his next appointment. Pt discharged to home in good condition.

## 2025-04-26 ENCOUNTER — OUTPATIENT INFUSION SERVICES (OUTPATIENT)
Dept: ONCOLOGY | Facility: MEDICAL CENTER | Age: 75
End: 2025-04-26
Attending: NURSE PRACTITIONER
Payer: MEDICARE

## 2025-04-26 VITALS
HEART RATE: 61 BPM | SYSTOLIC BLOOD PRESSURE: 144 MMHG | RESPIRATION RATE: 18 BRPM | TEMPERATURE: 99.1 F | OXYGEN SATURATION: 93 % | DIASTOLIC BLOOD PRESSURE: 74 MMHG

## 2025-04-26 DIAGNOSIS — N39.0 RECURRENT UTI (URINARY TRACT INFECTION): ICD-10-CM

## 2025-04-26 PROCEDURE — 700105 HCHG RX REV CODE 258: Performed by: NURSE PRACTITIONER

## 2025-04-26 PROCEDURE — 96365 THER/PROPH/DIAG IV INF INIT: CPT

## 2025-04-26 PROCEDURE — 700111 HCHG RX REV CODE 636 W/ 250 OVERRIDE (IP): Mod: JZ | Performed by: NURSE PRACTITIONER

## 2025-04-26 RX ORDER — 0.9 % SODIUM CHLORIDE 0.9 %
VIAL (ML) INJECTION PRN
Status: CANCELLED | OUTPATIENT
Start: 2025-04-27

## 2025-04-26 RX ORDER — 0.9 % SODIUM CHLORIDE 0.9 %
10 VIAL (ML) INJECTION PRN
Status: CANCELLED | OUTPATIENT
Start: 2025-04-27

## 2025-04-26 RX ORDER — 0.9 % SODIUM CHLORIDE 0.9 %
3 VIAL (ML) INJECTION PRN
Status: CANCELLED | OUTPATIENT
Start: 2025-04-27

## 2025-04-26 RX ADMIN — ERTAPENEM SODIUM 1000 MG: 1 INJECTION, POWDER, LYOPHILIZED, FOR SOLUTION INTRAMUSCULAR; INTRAVENOUS at 17:28

## 2025-04-26 ASSESSMENT — PAIN DESCRIPTION - PAIN TYPE: TYPE: ACUTE PAIN

## 2025-04-27 ENCOUNTER — OUTPATIENT INFUSION SERVICES (OUTPATIENT)
Dept: ONCOLOGY | Facility: MEDICAL CENTER | Age: 75
End: 2025-04-27
Attending: NURSE PRACTITIONER
Payer: MEDICARE

## 2025-04-27 VITALS
SYSTOLIC BLOOD PRESSURE: 159 MMHG | DIASTOLIC BLOOD PRESSURE: 90 MMHG | BODY MASS INDEX: 30.84 KG/M2 | OXYGEN SATURATION: 91 % | TEMPERATURE: 98 F | HEART RATE: 78 BPM | WEIGHT: 214.95 LBS | RESPIRATION RATE: 18 BRPM

## 2025-04-27 DIAGNOSIS — N39.0 RECURRENT UTI (URINARY TRACT INFECTION): ICD-10-CM

## 2025-04-27 PROCEDURE — 700111 HCHG RX REV CODE 636 W/ 250 OVERRIDE (IP): Mod: JZ | Performed by: NURSE PRACTITIONER

## 2025-04-27 PROCEDURE — 700105 HCHG RX REV CODE 258: Performed by: NURSE PRACTITIONER

## 2025-04-27 PROCEDURE — 96365 THER/PROPH/DIAG IV INF INIT: CPT

## 2025-04-27 RX ORDER — 0.9 % SODIUM CHLORIDE 0.9 %
10 VIAL (ML) INJECTION PRN
Status: CANCELLED | OUTPATIENT
Start: 2025-04-28

## 2025-04-27 RX ORDER — 0.9 % SODIUM CHLORIDE 0.9 %
VIAL (ML) INJECTION PRN
Status: CANCELLED | OUTPATIENT
Start: 2025-04-28

## 2025-04-27 RX ORDER — 0.9 % SODIUM CHLORIDE 0.9 %
3 VIAL (ML) INJECTION PRN
Status: CANCELLED | OUTPATIENT
Start: 2025-04-28

## 2025-04-27 RX ADMIN — ERTAPENEM SODIUM 1000 MG: 1 INJECTION, POWDER, LYOPHILIZED, FOR SOLUTION INTRAMUSCULAR; INTRAVENOUS at 15:55

## 2025-04-27 ASSESSMENT — PAIN DESCRIPTION - PAIN TYPE: TYPE: ACUTE PAIN

## 2025-04-27 ASSESSMENT — FIBROSIS 4 INDEX: FIB4 SCORE: 2.01

## 2025-04-27 NOTE — PROGRESS NOTES
Turk arrived via wheelchair for daily IV antibiotics. Plan of care reviewed, assessment completed, patient denies any acute changes from yesterday.   Midline intact, dressing C/D/I, flushed briskly, no blood return noted, no pain erythema, or edema noted.   Invanz administered per the MAR, patient tolerated well. Midline flushed per the protocol post infusion and secured in protective sleeve. Appointment for tomorrow was confirmed with the patient and he was discharged home in stable condition.

## 2025-04-27 NOTE — PROGRESS NOTES
Patient arrived to infusion center with for daily antibiotic infusion of Ertapenem. Patient had no new complaints today. MIDline flushed with positive blood return. Ertapenem infused without difficulty. MIDline flushed with saline, and covered with gauze and netting. His MIDline dressing was due to be changed today, but he asked since his last day tomorrow if we could just leave it. Dressing is intact. Patient confirms appointment for tomorrow. Discharged from infusion center with no apparent distress.

## 2025-04-28 ENCOUNTER — OUTPATIENT INFUSION SERVICES (OUTPATIENT)
Dept: ONCOLOGY | Facility: MEDICAL CENTER | Age: 75
End: 2025-04-28
Attending: NURSE PRACTITIONER
Payer: MEDICARE

## 2025-04-28 VITALS
SYSTOLIC BLOOD PRESSURE: 99 MMHG | RESPIRATION RATE: 16 BRPM | TEMPERATURE: 97.9 F | HEART RATE: 59 BPM | OXYGEN SATURATION: 94 % | DIASTOLIC BLOOD PRESSURE: 57 MMHG

## 2025-04-28 DIAGNOSIS — N39.0 RECURRENT UTI (URINARY TRACT INFECTION): ICD-10-CM

## 2025-04-28 LAB
ALBUMIN SERPL BCP-MCNC: 3.4 G/DL (ref 3.2–4.9)
ALBUMIN/GLOB SERPL: 0.9 G/DL
ALP SERPL-CCNC: 79 U/L (ref 30–99)
ALT SERPL-CCNC: 8 U/L (ref 2–50)
ANION GAP SERPL CALC-SCNC: 10 MMOL/L (ref 7–16)
AST SERPL-CCNC: 15 U/L (ref 12–45)
BASOPHILS # BLD AUTO: 0.4 % (ref 0–1.8)
BASOPHILS # BLD: 0.03 K/UL (ref 0–0.12)
BILIRUB SERPL-MCNC: 0.4 MG/DL (ref 0.1–1.5)
BUN SERPL-MCNC: 19 MG/DL (ref 8–22)
CALCIUM ALBUM COR SERPL-MCNC: 9.3 MG/DL (ref 8.5–10.5)
CALCIUM SERPL-MCNC: 8.8 MG/DL (ref 8.5–10.5)
CHLORIDE SERPL-SCNC: 108 MMOL/L (ref 96–112)
CO2 SERPL-SCNC: 22 MMOL/L (ref 20–33)
CREAT SERPL-MCNC: 0.46 MG/DL (ref 0.5–1.4)
EOSINOPHIL # BLD AUTO: 0.21 K/UL (ref 0–0.51)
EOSINOPHIL NFR BLD: 2.9 % (ref 0–6.9)
ERYTHROCYTE [DISTWIDTH] IN BLOOD BY AUTOMATED COUNT: 51.4 FL (ref 35.9–50)
GFR SERPLBLD CREATININE-BSD FMLA CKD-EPI: 109 ML/MIN/1.73 M 2
GLOBULIN SER CALC-MCNC: 3.8 G/DL (ref 1.9–3.5)
GLUCOSE SERPL-MCNC: 90 MG/DL (ref 65–99)
HCT VFR BLD AUTO: 39.2 % (ref 42–52)
HGB BLD-MCNC: 12.9 G/DL (ref 14–18)
IMM GRANULOCYTES # BLD AUTO: 0.03 K/UL (ref 0–0.11)
IMM GRANULOCYTES NFR BLD AUTO: 0.4 % (ref 0–0.9)
LYMPHOCYTES # BLD AUTO: 1.8 K/UL (ref 1–4.8)
LYMPHOCYTES NFR BLD: 25.1 % (ref 22–41)
MCH RBC QN AUTO: 33.5 PG (ref 27–33)
MCHC RBC AUTO-ENTMCNC: 32.9 G/DL (ref 32.3–36.5)
MCV RBC AUTO: 101.8 FL (ref 81.4–97.8)
MONOCYTES # BLD AUTO: 0.73 K/UL (ref 0–0.85)
MONOCYTES NFR BLD AUTO: 10.2 % (ref 0–13.4)
NEUTROPHILS # BLD AUTO: 4.38 K/UL (ref 1.82–7.42)
NEUTROPHILS NFR BLD: 61 % (ref 44–72)
NRBC # BLD AUTO: 0 K/UL
NRBC BLD-RTO: 0 /100 WBC (ref 0–0.2)
OUTPT INFUS CBC COMMENT OICOM: ABNORMAL
PLATELET # BLD AUTO: 154 K/UL (ref 164–446)
PMV BLD AUTO: 8.9 FL (ref 9–12.9)
POTASSIUM SERPL-SCNC: 3.9 MMOL/L (ref 3.6–5.5)
PROT SERPL-MCNC: 7.2 G/DL (ref 6–8.2)
RBC # BLD AUTO: 3.85 M/UL (ref 4.7–6.1)
SODIUM SERPL-SCNC: 140 MMOL/L (ref 135–145)
WBC # BLD AUTO: 7.2 K/UL (ref 4.8–10.8)

## 2025-04-28 PROCEDURE — 80053 COMPREHEN METABOLIC PANEL: CPT

## 2025-04-28 PROCEDURE — 36415 COLL VENOUS BLD VENIPUNCTURE: CPT

## 2025-04-28 PROCEDURE — 96365 THER/PROPH/DIAG IV INF INIT: CPT

## 2025-04-28 PROCEDURE — 700105 HCHG RX REV CODE 258: Performed by: NURSE PRACTITIONER

## 2025-04-28 PROCEDURE — 85025 COMPLETE CBC W/AUTO DIFF WBC: CPT

## 2025-04-28 PROCEDURE — 700111 HCHG RX REV CODE 636 W/ 250 OVERRIDE (IP): Mod: JZ | Performed by: NURSE PRACTITIONER

## 2025-04-28 RX ORDER — 0.9 % SODIUM CHLORIDE 0.9 %
3 VIAL (ML) INJECTION PRN
Status: CANCELLED | OUTPATIENT
Start: 2025-04-29

## 2025-04-28 RX ORDER — 0.9 % SODIUM CHLORIDE 0.9 %
10 VIAL (ML) INJECTION PRN
Status: CANCELLED | OUTPATIENT
Start: 2025-04-29

## 2025-04-28 RX ORDER — 0.9 % SODIUM CHLORIDE 0.9 %
VIAL (ML) INJECTION PRN
Status: CANCELLED | OUTPATIENT
Start: 2025-04-29

## 2025-04-28 RX ADMIN — ERTAPENEM SODIUM 1000 MG: 1 INJECTION, POWDER, LYOPHILIZED, FOR SOLUTION INTRAMUSCULAR; INTRAVENOUS at 17:27

## 2025-04-28 ASSESSMENT — PAIN DESCRIPTION - PAIN TYPE: TYPE: ACUTE PAIN

## 2025-04-29 NOTE — PROGRESS NOTES
Pt presented to Infusion Services for daily Invanz. Reported no significant changes from yesterday. Left arm midline present, flushed easily, no blood return observed. Invanz infused without s/sx of adverse reaction. Midline flushed, clamped. Last day of treatment today. Midline removed intact, gauze and coban applied . Labs drawn via venipuncture. Pt left via wheelchair in NAD.

## 2025-04-30 ENCOUNTER — OFFICE VISIT (OUTPATIENT)
Dept: WOUND CARE | Facility: MEDICAL CENTER | Age: 75
End: 2025-04-30
Payer: MEDICARE

## 2025-04-30 DIAGNOSIS — M86.18 OTHER ACUTE OSTEOMYELITIS, OTHER SITE (HCC): ICD-10-CM

## 2025-04-30 DIAGNOSIS — L89.154 SACRAL DECUBITUS ULCER, STAGE IV (HCC): ICD-10-CM

## 2025-04-30 DIAGNOSIS — T83.038D: ICD-10-CM

## 2025-04-30 DIAGNOSIS — G82.54 QUADRIPLEGIA, C5-C7, INCOMPLETE (HCC): ICD-10-CM

## 2025-04-30 DIAGNOSIS — L89.324 PRESSURE INJURY OF LEFT ISCHIUM, STAGE 4 (HCC): ICD-10-CM

## 2025-04-30 DIAGNOSIS — L89.314 PRESSURE INJURY OF RIGHT ISCHIUM, STAGE 4 (HCC): ICD-10-CM

## 2025-04-30 PROCEDURE — 11043 DBRDMT MUSC&/FSCA 1ST 20/<: CPT

## 2025-04-30 PROCEDURE — 11046 DBRDMT MUSC&/FSCA EA ADDL: CPT

## 2025-04-30 PROCEDURE — 11046 DBRDMT MUSC&/FSCA EA ADDL: CPT | Performed by: STUDENT IN AN ORGANIZED HEALTH CARE EDUCATION/TRAINING PROGRAM

## 2025-04-30 PROCEDURE — 11043 DBRDMT MUSC&/FSCA 1ST 20/<: CPT | Performed by: STUDENT IN AN ORGANIZED HEALTH CARE EDUCATION/TRAINING PROGRAM

## 2025-05-01 ENCOUNTER — TELEPHONE (OUTPATIENT)
Dept: INFECTIOUS DISEASES | Facility: MEDICAL CENTER | Age: 75
End: 2025-05-01
Payer: MEDICARE
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
## 2025-05-01 NOTE — TELEPHONE ENCOUNTER
Called and spoke with patient via telephone.most recent lab results from 4/28/2025, stable anemia, WBC WNL, neutrophils WNL, pending 0.46.  Recommended increased water and electrolytes in diet.  He denies any signs of recurrence of UTI.  Clear yellow urine and Cazares catheter bag.  Follow-up with ID clinic as needed.

## 2025-05-01 NOTE — PROGRESS NOTES
Provider Encounter- Pressure Injury        HISTORY OF PRESENT ILLNESS  Wound History:    START OF CARE IN CLINIC: 1/31/2024 (return to clinic after hospitalization)    REFERRING PROVIDER: Racquel York       WOUNDS-superior coccyx pressure injury, stage IV-resolved                                 Coccyx pressure injury, stage IV-resolved           Inferior coccyx pressure injury, stage IV resolved                                 Right ischial pressure injury, stage IV                                 Left heel pressure injury, recurring stage III-resolved                                 Left posterior lower leg/calf-stage III-resolved                                 Left medial lower leg surgical wound dehiscence-resolved, reopens frequently-resolved            Left ischial pressure injury, stage IV-first observed in clinic 5/1/2024          HISTORY: Patient with history of incomplete quadriplegia referred to [facility] for treatment of a stage IV pressure injury.  He has a history of previous pressure injuries to this area, and underwent muscle flaps in 2019, and then again in 2020.  He was seen in the wound clinic in November 2021 for an ulcer proximal from his current ulcer, and pressure injuries to his left posterior lower leg and left heel.  At that time, it was discovered that the patient had retained VAC foam embedded in the wound bed of the sacral wound.  Attempts were made to get him back to his plastic surgeon, though unsuccessful.  In January he underwent surgical removal of VAC sponge along with excisional debridement of his sacral wound by Dr. Chaves.  After the surgery, his wound went on to heal without incident.   In early April 2022, his home health nurse noted a new sacral ulcer, below the previous ulcer which quickly tripled in size over the following weeks.  The ulcer to his left medial lower leg had also deteriorated, with bone visible at the base..  He was hospitalized from 4/22 until 4/27/2022 and  underwent surgery with Dr. Raman on 4/26 for irrigation and debridement of multiple compartments of the left lower extremity, bone excision, and complex closure of chronic wound using biologic skin substitute.   His sacrococcygeal wound was not surgically addressed during this admission.  He was discharged back to his group home, with home health, and referral to outpatient wound clinic for his sacral wound.  He was instructed to follow-up with his surgeon for his lower leg wound.       Postoperatively, the left medial lower leg incision dehisced.  He was seen by his surgeon at [facility] on 5/11.  The surgeon opted to leave remaining sutures in place, and refer him to the wound clinic for treatment of this wound.   Treatment of this wound was initiated in clinic on 5/12.  During this visit was also noted that his heel DTI had resolved, but that he had a new pressure injury to his left posterior lower extremity.     A new pressure injury was noted to patient's right upper buttock/lower back on 5/20/2022.  Wound was linear in shape, skin discolored but intact.     Abrasion noted to left anterior lower leg.  First observed in clinic on 7/22/2022.  Patient states he bumped his leg into his food tray.     Small DTI noted to patient's left lateral lower leg on 7/29/2022.  Skin intact but discolored.     Large area of deep tissue injury noted to patient's left exterior lower leg.  Patient denied any trauma to this area.  Skin intact.  Wound documented.    1/27/2023: Patient was admitted to [facility] from 1/23-1/25/2023 with gross hematuria. He underwent RICHARD which showed watchman device was in place and he was taken off of Xarelto. While hospitalized wound team was consulted. He was referred back to [facility] and home health upon discharge.    Patient was hospitalized at [facility] for pyelonephritis from 2/26 until 3/2/2023, admitted for fever and general malaise.  He was admitted and initially started on linezolid and meropenem for suspected  UTI and history of multidrug-resistant organisms.  Urine cultures were negative. ID was consulted, recommended CT of chest and abdomen,which were negative for acute findings. However, he was treated with 5 days total course of antibiotics for suspected UTI, and symptoms completely resolved.  During this admission, the inpatient wound team was consulted for treatment of his sacral and lower leg wounds.  A wound culture was taken from his left heel pressure ulcer, negative.  Once stabilized, he was discharged home and referred back to [facility] to resume treatment of his wounds.    Patient was hospitalized at [facility] from 12/11 until 12/23/2023, admitted for fever.  Wound infection suspected.  CT scan of abdomen and pelvis for evaluation of sacral pressure injury showed gas tracking down to the bone consistent with osteomyelitis.  He underwent I&D of right ischial ulcer (documented as buttock) with Dr. Bansal, medial tract leading to an abscess was identified.  Cavity was opened allowing it to drain into the main wound bed.  Wound VAC was placed and managed by wound team during this admission.  A bone scan of patient's left foot was also done, initially concerning for osteomyelitis.  Orthopedic surgery was consulted and did not recommend surgical intervention. ID consulted also, recommended the patient to receive IV ertapenem 1 g every 24 hours plus IV daptomycin 8 mg/kg every 24 hours through 1/22/2024.   He was discharged to [facility] on 1/23 for IV antibiotics and wound care.  From the LTAC he was discharged home on 1/22 with home health and referral back to [facility] to resume management of his wounds      Pertinent Medical History: Incomplete quadriplegia, history of stage IV pressure injuries, history of flap procedures to sacral pressure injuries, osteomyelitis, obesity, colostomy in place   Contributing factors: Immobility and Obesity, impaired sensation    Personal support: Attendant-staff at intermediate and home health  nursing    TOBACCO USE:   Former smoker, quit in 1977.  Never used smokeless tobacco    Patient's problem list, allergies, and current medications reviewed and updated in Epic    Interval History:  Interval History thinned 7/29/2022.  Please see previous notes for complete interval history.   Interval History thinned 1/27/2023. Please see previous note for complete interval history.  Interval History thinned 3/3/2023.  Please see previous notes for complete interval history.    Interval History thinned 8/4/2023.  Please see previous notes for complete interval history.    Interval History thinned 1/31/2024.  Please see previous notes for complete interval history.    Interval History thinned 6/26/2024.  Please see previous notes for complete interval history.  Interval History thinned 4/29/2025.  Please see previous notes for complete interval history.         4/23/2025: Clinic visit with Steve Hodges MD. Patient spent more time in chair this week with Easter and wounds slightly larger though measurements are fairly positional. Less maceration and saturation of cover dressing with enluxtra, will continue.    4/30/2025: Clinic visit with Steve Hodges MD. Patient reports doing ok. Denies any acute issues. Wounds stable, continues to have heavy drainage. Will hold enluxtra today and change to superabsorbent pad to see if will manage drainage better.    REVIEW OF SYSTEMS:   Unchanged from previous wound clinic assessment on 4/23/2025, except as noted in interval history above.      PHYSICAL EXAMINATION:   There were no vitals taken for this visit.  Physical Exam  Constitutional:       Appearance: He is obese.   Cardiovascular:      Rate and Rhythm: Normal rate.   Pulmonary:      Effort: Pulmonary effort is normal.   Abdominal:      Comments: Colostomy left lower quadrant   Genitourinary:     Comments: Suprapubic catheter to down drain   Skin:     Comments: Stage IV pressure injury to right ischium: Wound stable,  continued heavy drainage. Thin layer of slough to wound bed.  No odor. No periwound erythema or induration    Stage IV pressure injury of left ischium: Wound stable. Thin layer of slough to wound bed. Heavy serous drainage with no odor today. No evidence of soft tissue infection    Stage IV pressure injury sacrum: Remains resolved    All other wounds remain healed, high risk for recurrence     Neurological:      Mental Status: He is alert and oriented to person, place, and time.   Psychiatric:         Mood and Affect: Mood normal.         WOUND ASSESSMENT  Wound 12/12/23 Pressure Injury Ischium Right (Active)   Wound Image    04/30/25 1500   Site Assessment Pink;Red;Yellow;Undermining 04/30/25 1500   Periwound Assessment Scar tissue;Maceration 04/30/25 1500   Margins Unattached edges 04/30/25 1500   Closure Secondary intention 04/02/25 1559   Drainage Amount Large 04/30/25 1500   Drainage Description Serosanguineous 04/30/25 1500   Treatments Cleansed;Provider debridement;Silver nitrate;Irrigation;Site care 04/30/25 1500   Offloading/DME Other (comment) 04/30/25 1500   Wound Cleansing Hypochlorus Acid 04/30/25 1500   Periwound Protectant No-sting Skin Prep;Barrier Paste 04/30/25 1500   Dressing Changed Changed 04/30/25 1500   Dressing Cleansing/Solutions Normal Saline 04/30/25 1500   Dressing Options Hydrofera Blue Classic;Super Absorbent Pad;Offloading Dressing - Sacral 04/30/25 1500   Dressing Change/Treatment Frequency Monday, Wednesday, Friday, and As Needed 04/30/25 1500   WOUND NURSE ONLY - Pressure Injury Stage 4 04/30/25 1500   Non-staged Wound Description Not applicable 04/02/25 1559   Wound Length (cm) 5.2 cm 04/30/25 1500   Wound Width (cm) 2.5 cm 04/30/25 1500   Wound Depth (cm) 0.7 cm 04/30/25 1500   Wound Surface Area (cm^2) 13 cm^2 04/30/25 1500   Wound Volume (cm^3) 9.1 cm^3 04/30/25 1500   Post-Procedure Length (cm) 5.5 cm 04/30/25 1500   Post-Procedure Width (cm) 2.5 cm 04/30/25 1500    Post-Procedure Depth (cm) 0.7 cm 04/30/25 1500   Post-Procedure Surface Area (cm^2) 13.75 cm^2 04/30/25 1500   Post-Procedure Volume (cm^3) 9.625 cm^3 04/30/25 1500   Wound Healing % 84 04/30/25 1500   Tunneling (cm) 0 cm 04/23/25 1600   Tunneling Clock Position of Wound 6 03/26/25 1500   Undermining (cm) 1 cm 04/30/25 1500   Undermining of Wound, 1st Location From 3 o'clock;To 9 o'clock 04/30/25 1500   Undermining (cm) - 2nd location 0.5 cm 02/19/25 1500   Undermining of Wound, 2nd Location From 7 o'clock;From 12 o'clock;To 2 o'clock 02/19/25 1500   Wound Odor None 04/30/25 1500   Exposed Structures None 04/30/25 1500   Number of days: 506       Wound 05/01/24 Pressure Injury Ischium Left (Active)   Wound Image    04/30/25 1500   Site Assessment Pink;Red;Yellow 04/30/25 1500   Periwound Assessment Maceration;Scar tissue 04/30/25 1500   Margins Unattached edges 04/30/25 1500   Closure Secondary intention 04/02/25 1559   Drainage Amount Large 04/30/25 1500   Drainage Description Serosanguineous 04/30/25 1500   Treatments Cleansed;Provider debridement;Site care 04/30/25 1500   Offloading/DME Other (comment) 04/30/25 1500   Wound Cleansing Hypochlorus Acid 04/30/25 1500   Periwound Protectant No-sting Skin Prep;Barrier Paste 04/30/25 1500   Dressing Changed Changed 04/30/25 1500   Dressing Cleansing/Solutions Normal Saline 04/30/25 1500   Dressing Options Hydrofera Blue Classic;Super Absorbent Pad;Offloading Dressing - Sacral;Hypafix Tape 04/30/25 1500   Dressing Change/Treatment Frequency Monday, Wednesday, Friday, and As Needed 04/30/25 1500   Wound Team Following Weekly 12/18/24 1700   WOUND NURSE ONLY - Pressure Injury Stage 4 04/30/25 1500   Non-staged Wound Description Not applicable 04/30/25 1500   Wound Length (cm) 4.7 cm 04/30/25 1500   Wound Width (cm) 3 cm 04/30/25 1500   Wound Depth (cm) 2.6 cm 04/30/25 1500   Wound Surface Area (cm^2) 14.1 cm^2 04/30/25 1500   Wound Volume (cm^3) 36.66 cm^3 04/30/25 1500  "  Post-Procedure Length (cm) 5 cm 04/30/25 1500   Post-Procedure Width (cm) 3 cm 04/30/25 1500   Post-Procedure Depth (cm) 2.6 cm 04/30/25 1500   Post-Procedure Surface Area (cm^2) 15 cm^2 04/30/25 1500   Post-Procedure Volume (cm^3) 39 cm^3 04/30/25 1500   Wound Healing % -[id number] 04/30/25 1500   Tunneling (cm) 0 cm 04/30/25 1500   Undermining (cm) 0 cm 04/30/25 1500   Undermining of Wound, 1st Location From 4 o'clock;To 10 o'clock 12/18/24 1700   Wound Odor None 04/30/25 1500   Exposed Structures None 04/30/25 1500   Number of days: 365       PROCEDURE: Excisional debridement of right and left ischial wounds  -2% viscous lidocaine applied topically to wound bed for approximately 5 minutes prior to debridement  -Curette used to debride both wound beds.  Excisional debridement was performed to remove devitalized tissue until healthy, bleeding tissue was visualized.  Total area debrided was 28.75 cm², including into undermined areas of wounds.  Tissue debrided into the muscle / fascia layer.    -Bleeding controlled with manual pressure.  - Hypergranulation tissue treated with silver nitrate  -Wound care completed by wound RN, refer to flowsheet  -Patient tolerated the procedure well, without c/o pain or discomfort.    Pertinent Labs and Diagnostics:    Labs:     A1c: No results found for: \"HBA1C\"     [facility] results, 7/1/2022 (under media tab)    CRP 13    ESR 31      IMAGING:     X-ray left tib-fib ordered 2/16/2024 through [facility]    12/11/2023-CT of abdomen pelvis with contrast  IMPRESSION:   1.  Right ischial decubitus ulcer extending to bone with soft tissue gas tracking along the right perineum. Appearance suggesting osteomyelitis, consider component of necrotizing fasciitis as clinically appropriate.  2.  Small pericardial effusion  3.  Left adrenal nodule, density on prior noncontrast CT demonstrates adenoma.  4.  Hepatomegaly  5.  Enlarged prostate, workup and evaluation for causes of prostate " enlargement recommended as clinically appropriate.  6.  Atherosclerosis and atherosclerotic coronary artery disease    12/15/2023-bone scan of left foot  IMPRESSION:     1.  Mild increased activity in the LEFT 1st and 3rd toes on blood pool and delayed images possibly indicating inflammation/infection.  2.  No significant blood flow asymmetry.          VASCULAR STUDIES: No results found.    LAST  WOUND CULTURE:   Lab Results   Component Value Date/Time    CULTRSULT Usual skin ade 10-50,000 cfu/mL (A) 04/12/2025 09:45 PM    CULTRSULT Proteus mirabilis  ,000 cfu/mL   (A) 04/12/2025 09:45 PM      PATHOLOGY  2/17/2023-bone fragment extracted from left lower extremity wound\\  FINAL DIAGNOSIS:     A. Left leg bone fragment at base of chronic wound:          Extensively degenerated fibrocartilaginous tissue with a rim of           fibrinopurulent debris          Correlate with culture findings            Comment: While no residual intact bone is identified, these           findings are suggestive of adjacent osteomyelitis.      ASSESSMENT AND PLAN:     1. Sacral decubitus ulcer, stage IV ([facility])  Comments: Ulcer first noted in early April 2022 as small open area which quickly enlarged.  This ulcer is present distal from previous sacral ulcer which healed after surgery in January.  Patient has history of flap reconstruction x2 to this area.    4/30/2025: Wound remains resolved, though at high risk for reoccurrence  - Continue to protect area with pressure relieving sacral foam dressing.  -Patient does spend a lot of time up in his wheelchair, and wishes to continue to do so for his quality of life.  He lives independently, drives, and is involved with family activities.    -His has a new cushion in his wheelchair.  Has yet to pick out a new wheelchair  - Known OM that was previously treated. CT scan done during recent hospitalization did not show OM of sacrum, however OM of the ischium noted.  -Patient is very well  versed in pressure relief strategies  -Monitor site each clinic visit    Wound care: silicone adhesive foam dressing    2. Pressure injury of right ischium, stage 4 ([facility])  Comments: Abscess and OM found on CT during hospitalization in December 2023.  Patient underwent I&D with VAC placement.  IV antibiotics through 1/22/2024.    4/30/2025: Wound stable. Measurements somewhat positional  - Excisional debridement of nonviable tissue from wound in clinic today, medically necessary to promote wound healing  - VAC discontinued previously.  -Patient to return to clinic weekly for assessment, debridement, and dressing change.    -Home health to change dressing 2 times per week in between clinic visits.    -Patient is very well versed on pressure relief measures, has adequate surface on bed, alternating low air loss.  - Continue enluxtra with backing removed for better drainage control.    Wound care: Hydrofera Blue Classic, enluxtra, Silicone foam    3. Pressure injury of left ischium, stage 4 ([facility])    4/30/2025: Wound stable. Measurements somewhat positional  - Excisional debridement of wound in clinic today, medically necessary to promote wound healing.  - Patient to return to clinic weekly for assessment, debridement, and dressing change  -VAC has been discontinued  -Home health to change dressing 2 times per week in between clinic visits.    -Patient has new cushion in his wheelchair, see above  -Patient is very well versed on pressure relief measures, has adequate surface on bed, alternating low air loss.    Wound care: Hydrofera Blue Classic, enluxtra, Silicone foam    4. Other acute osteomyelitis, other site ([facility])    4/30/2025: Bone fragments removed during clinic visit in June were sent for pathology, polymicrobial, most concerning was an MDR ESBL Proteus.   -Patient completed IV antibiotics.  No further antibiotics at this time per ID  -Patient understands he has a very low threshold for infection/sepsis.  He  understands he is to go to the emergency room immediately if he begins to experience fevers, chills, nausea or vomiting, or if he notices radiating erythema or purulent drainage from his wounds.    5. Quadriplegia, C5-C7 incomplete ([facility])  Comments: Complicating factor.  Impaired mobility and sensation  -Patient is still spending 7-8 hours/day up in his wheelchair and knows to reposition frequently.  Wears heel float boots bilaterally at all times  - Patient has new custom wheelchair seat. He had refitting and appears he was not sitting back in chair enough which was causing undue pressure to IT. He is more aware of the correct positioning in chair.    6.  Leakage from urinary catheter, subsequent encounter    4/30/2025:   - Leakage has decreased  - Following with urology, performing Botox injections  - He discussed ileal conduit with urologist. They will be pursuing other therapies before considering.  - recently Dx with UTI, recently treated with Ertapenem    Please note that this note may have been created using voice recognition software. I have worked with technical experts from [employer] to optimize the interface.  I have made every reasonable attempt to correct obvious errors, but there may be errors of grammar and possibly content that I did not discover before finalizing the note.

## 2025-05-01 NOTE — TELEPHONE ENCOUNTER
Phone Number Called: [phone]    Call outcome: Spoke to patient regarding message below.    Message: Pt completed abx, midline removed and was advised by infusion clinic to follow up with provider. Pt requesting labs from 4/28 to be reviewed.

## 2025-05-01 NOTE — PROGRESS NOTES
Updated home wound care order routed to [facility] via [facility] ([place], [place]).

## 2025-05-01 NOTE — TELEPHONE ENCOUNTER
VOICEMAIL  1. Caller Name: Turk  Call Back Number: [phone]    2. Message: Finished abx on 4/29/25, following up     3. Patient approves office to leave a detailed voicemail/MyChart message: yes

## 2025-05-01 NOTE — PATIENT INSTRUCTIONS
-Keep your wound dressing clean, dry, and intact. Only change dressing if it's over saturated, soiled or falls off.     -Should you experience any significant changes in your wound(s), such as infection (redness, swelling, localized heat, increased pain, fever > 101 F, chills) or have any questions regarding your home care instructions, please contact the wound center at [phone]. If after hours, contact your primary care physician or go to the hospital emergency room.

## 2025-05-07 ENCOUNTER — OFFICE VISIT (OUTPATIENT)
Dept: WOUND CARE | Facility: MEDICAL CENTER | Age: 75
End: 2025-05-07
Payer: MEDICARE

## 2025-05-07 DIAGNOSIS — G82.54 QUADRIPLEGIA, C5-C7, INCOMPLETE (HCC): ICD-10-CM

## 2025-05-07 DIAGNOSIS — L89.314 PRESSURE INJURY OF RIGHT ISCHIUM, STAGE 4 (HCC): ICD-10-CM

## 2025-05-07 DIAGNOSIS — T83.038D: ICD-10-CM

## 2025-05-07 DIAGNOSIS — L89.154 SACRAL DECUBITUS ULCER, STAGE IV (HCC): ICD-10-CM

## 2025-05-07 DIAGNOSIS — L89.324 PRESSURE INJURY OF LEFT ISCHIUM, STAGE 4 (HCC): ICD-10-CM

## 2025-05-07 DIAGNOSIS — M86.18 OTHER ACUTE OSTEOMYELITIS, OTHER SITE (HCC): ICD-10-CM

## 2025-05-07 PROCEDURE — 11046 DBRDMT MUSC&/FSCA EA ADDL: CPT | Performed by: STUDENT IN AN ORGANIZED HEALTH CARE EDUCATION/TRAINING PROGRAM

## 2025-05-07 PROCEDURE — 11043 DBRDMT MUSC&/FSCA 1ST 20/<: CPT | Performed by: STUDENT IN AN ORGANIZED HEALTH CARE EDUCATION/TRAINING PROGRAM

## 2025-05-07 PROCEDURE — 11046 DBRDMT MUSC&/FSCA EA ADDL: CPT

## 2025-05-07 PROCEDURE — 11043 DBRDMT MUSC&/FSCA 1ST 20/<: CPT

## 2025-05-07 NOTE — PATIENT INSTRUCTIONS
"The following information is a summary of the education provided in the clinic today. It is not an exhaustive list..       DRESSING CHANGES    -Keep your wound dressing clean, dry, and intact. Change your dressing every 2 or 3 days  AND/OR if it's, soiled, leaks, gets wet, or falls off.      -You may not shower with the dressing(s) off. Please do not take baths, or swim in the ocean, lakes, rivers, pools, or hot tubs.      -Wounds do not need to \"air out\" or \"breathe\". Gently dry your wound before placing a new dressing.     -If you need to change your dressings at home, you should wash your wound. Use normal saline, wound cleanser, hypochlorous acid, or unscented soap and water. Do not use hydrogen peroxide or rubbing alcohol to clean your wounds. Hydrogen peroxide and rubbing alcohol will damage new cells and tissue. Do not use betadine or iodine unless told to by your wound care team.    -If you have home health, they will provide your wound care supplies.     -Please note, the dressings will likely change over time as your wound changes.     CLINIC INFORMATION  -The clinic hours are Monday-Friday, 7:30 AM to 5:00 PM. We are closed most holidays and on weekends. If you leave us a message, please allow 24 hours for someone to return your call. If you are having a medical emergency, call 911 or go to the hospital emergency room.     -You may not see the same nurse or provider every visit.     -If you notice any large changes in your wound(s), or signs of infection (redness, swelling, localized heat, increased pain, fever > 101 F, chills, nausea/vomiting) or have any questions about your home care instructions, please call the wound center at [phone]. If it's after hours, contact your primary care physician or go to the hospital emergency room. If you are admitted to any hospital, you will need a new referral to come back to the wound clinic. Any wound care appointments that you already have may be " cancelled.    -If you are 5 or more minutes late for an appointment, we reserve the right to cancel and reschedule that appointment. For example, if your appointment is at 1:00 PM, and you arrive at 1:06 PM, you are more than five minutes late and might not be seen. If you are consistently late or not coming to your appointments (typically 3 late cancellations and/or no shows), we reserve the right to cancel your future appointments or discharge you from the clinic. It is then your responsibility to obtain a new referral if wound care is still needed.

## 2025-05-07 NOTE — PROGRESS NOTES
Provider Encounter- Pressure Injury        HISTORY OF PRESENT ILLNESS  Wound History:    START OF CARE IN CLINIC: 1/31/2024 (return to clinic after hospitalization)    REFERRING PROVIDER: Racquel York       WOUNDS-superior coccyx pressure injury, stage IV-resolved                                 Coccyx pressure injury, stage IV-resolved           Inferior coccyx pressure injury, stage IV resolved                                 Right ischial pressure injury, stage IV                                 Left heel pressure injury, recurring stage III-resolved                                 Left posterior lower leg/calf-stage III-resolved                                 Left medial lower leg surgical wound dehiscence-resolved, reopens frequently-resolved            Left ischial pressure injury, stage IV-first observed in clinic 5/1/2024          HISTORY: Patient with history of incomplete quadriplegia referred to [facility] for treatment of a stage IV pressure injury.  He has a history of previous pressure injuries to this area, and underwent muscle flaps in 2019, and then again in 2020.  He was seen in the wound clinic in November 2021 for an ulcer proximal from his current ulcer, and pressure injuries to his left posterior lower leg and left heel.  At that time, it was discovered that the patient had retained VAC foam embedded in the wound bed of the sacral wound.  Attempts were made to get him back to his plastic surgeon, though unsuccessful.  In January he underwent surgical removal of VAC sponge along with excisional debridement of his sacral wound by Dr. Chaves.  After the surgery, his wound went on to heal without incident.   In early April 2022, his home health nurse noted a new sacral ulcer, below the previous ulcer which quickly tripled in size over the following weeks.  The ulcer to his left medial lower leg had also deteriorated, with bone visible at the base..  He was hospitalized from 4/22 until 4/27/2022 and  underwent surgery with Dr. Raman on 4/26 for irrigation and debridement of multiple compartments of the left lower extremity, bone excision, and complex closure of chronic wound using biologic skin substitute.   His sacrococcygeal wound was not surgically addressed during this admission.  He was discharged back to his group home, with home health, and referral to outpatient wound clinic for his sacral wound.  He was instructed to follow-up with his surgeon for his lower leg wound.       Postoperatively, the left medial lower leg incision dehisced.  He was seen by his surgeon at [facility] on 5/11.  The surgeon opted to leave remaining sutures in place, and refer him to the wound clinic for treatment of this wound.   Treatment of this wound was initiated in clinic on 5/12.  During this visit was also noted that his heel DTI had resolved, but that he had a new pressure injury to his left posterior lower extremity.     A new pressure injury was noted to patient's right upper buttock/lower back on 5/20/2022.  Wound was linear in shape, skin discolored but intact.     Abrasion noted to left anterior lower leg.  First observed in clinic on 7/22/2022.  Patient states he bumped his leg into his food tray.     Small DTI noted to patient's left lateral lower leg on 7/29/2022.  Skin intact but discolored.     Large area of deep tissue injury noted to patient's left exterior lower leg.  Patient denied any trauma to this area.  Skin intact.  Wound documented.    1/27/2023: Patient was admitted to [facility] from 1/23-1/25/2023 with gross hematuria. He underwent RICHARD which showed watchman device was in place and he was taken off of Xarelto. While hospitalized wound team was consulted. He was referred back to [facility] and home health upon discharge.    Patient was hospitalized at [facility] for pyelonephritis from 2/26 until 3/2/2023, admitted for fever and general malaise.  He was admitted and initially started on linezolid and meropenem for suspected  UTI and history of multidrug-resistant organisms.  Urine cultures were negative. ID was consulted, recommended CT of chest and abdomen,which were negative for acute findings. However, he was treated with 5 days total course of antibiotics for suspected UTI, and symptoms completely resolved.  During this admission, the inpatient wound team was consulted for treatment of his sacral and lower leg wounds.  A wound culture was taken from his left heel pressure ulcer, negative.  Once stabilized, he was discharged home and referred back to [facility] to resume treatment of his wounds.    Patient was hospitalized at [facility] from 12/11 until 12/23/2023, admitted for fever.  Wound infection suspected.  CT scan of abdomen and pelvis for evaluation of sacral pressure injury showed gas tracking down to the bone consistent with osteomyelitis.  He underwent I&D of right ischial ulcer (documented as buttock) with Dr. Bansal, medial tract leading to an abscess was identified.  Cavity was opened allowing it to drain into the main wound bed.  Wound VAC was placed and managed by wound team during this admission.  A bone scan of patient's left foot was also done, initially concerning for osteomyelitis.  Orthopedic surgery was consulted and did not recommend surgical intervention. ID consulted also, recommended the patient to receive IV ertapenem 1 g every 24 hours plus IV daptomycin 8 mg/kg every 24 hours through 1/22/2024.   He was discharged to [facility] on 1/23 for IV antibiotics and wound care.  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  Never used smokeless tobacco    Patient's problem list, allergies, and current medications reviewed and updated in Epic    Interval History:  Interval History thinned 7/29/2022.  Please see previous notes for complete interval history.   Interval History thinned 1/27/2023. Please see previous note for complete interval history.  Interval History thinned 3/3/2023.  Please see previous notes for complete interval history.    Interval History thinned 8/4/2023.  Please see previous notes for complete interval history.    Interval History thinned 1/31/2024.  Please see previous notes for complete interval history.    Interval History thinned 6/26/2024.  Please see previous notes for complete interval history.  Interval History thinned 4/29/2025.  Please see previous notes for complete interval history.         4/23/2025: Clinic visit with Steve Hodges MD. Patient spent more time in chair this week with Easter and wounds slightly larger though measurements are fairly positional. Less maceration and saturation of cover dressing with enluxtra, will continue.    4/30/2025: Clinic visit with Steve Hodges MD. Patient reports doing ok. Denies any acute issues. Wounds stable, continues to have heavy drainage. Will hold enluxtra today and change to superabsorbent pad to see if will manage drainage better.    5/7/2025: Clinic visit with Steve Hodges MD. Patient feels that drainage is adequately managed with current dressing. He denies any acute issues. Wounds stable.    REVIEW OF SYSTEMS:   Unchanged from previous wound clinic assessment on 4/30/2025, except as noted in interval history above.      PHYSICAL EXAMINATION:   There were no vitals taken for this visit.  Physical Exam  Constitutional:       Appearance: He is obese.   Cardiovascular:      Rate and Rhythm: Normal rate.   Pulmonary:      Effort: Pulmonary effort is normal.   Abdominal:      Comments:  Colostomy left lower quadrant   Genitourinary:     Comments: Suprapubic catheter to down drain   Skin:     Comments: Stage IV pressure injury to right ischium: Wound stable, continued heavy drainage. Thin layer of slough to wound bed.  No odor. No periwound erythema or induration    Stage IV pressure injury of left ischium: Wound stable. Thin layer of slough to wound bed. Heavy serous drainage with no odor today. No evidence of soft tissue infection    Stage IV pressure injury sacrum: Remains resolved    All other wounds remain healed, high risk for recurrence     Neurological:      Mental Status: He is alert and oriented to person, place, and time.   Psychiatric:         Mood and Affect: Mood normal.         WOUND ASSESSMENT  Wound 12/12/23 Pressure Injury Ischium Right (Active)   Wound Image   05/07/25 1400   Site Assessment Pink;Red;Yellow 05/07/25 1400   Periwound Assessment Maceration;Scar tissue 05/07/25 1400   Margins Unattached edges 05/07/25 1400   Closure Secondary intention 04/02/25 1559   Drainage Amount Large 05/07/25 1400   Drainage Description Serosanguineous 05/07/25 1400   Treatments Cleansed;Provider debridement 05/07/25 1400   Offloading/DME Other (comment) 05/07/25 1400   Wound Cleansing Hypochlorus Acid 05/07/25 1400   Periwound Protectant No-sting Skin Prep;Moisture Barrier 05/07/25 1400   Dressing Changed Changed 05/07/25 1400   Dressing Cleansing/Solutions Normal Saline 05/07/25 1400   Dressing Options Hydrofera Blue Classic;Hydrofiber;Super Absorbent Pad;Offloading Dressing - Sacral 05/07/25 1400   Dressing Change/Treatment Frequency Monday, Wednesday, Friday, and As Needed 05/07/25 1400   WOUND NURSE ONLY - Pressure Injury Stage 4 05/07/25 1400   Non-staged Wound Description Not applicable 04/02/25 1559   Wound Length (cm) 5 cm 05/07/25 1400   Wound Width (cm) 2.6 cm 05/07/25 1400   Wound Depth (cm) 0.2 cm 05/07/25 1400   Wound Surface Area (cm^2) 13 cm^2 05/07/25 1400   Wound Volume (cm^3)  2.6 cm^3 05/07/25 1400   Post-Procedure Length (cm) 5.2 cm 05/07/25 1400   Post-Procedure Width (cm) 2.7 cm 05/07/25 1400   Post-Procedure Depth (cm) 0.2 cm 05/07/25 1400   Post-Procedure Surface Area (cm^2) 14.04 cm^2 05/07/25 1400   Post-Procedure Volume (cm^3) 2.808 cm^3 05/07/25 1400   Wound Healing % 95 05/07/25 1400   Tunneling (cm) 0 cm 04/23/25 1600   Tunneling Clock Position of Wound 6 03/26/25 1500   Undermining (cm) 0.6 cm 05/07/25 1400   Undermining of Wound, 1st Location From 12 o'clock;To 7 o'clock 05/07/25 1400   Undermining (cm) - 2nd location 0.5 cm 02/19/25 1500   Undermining of Wound, 2nd Location From 7 o'clock;From 12 o'clock;To 2 o'clock 02/19/25 1500   Wound Odor Foul;Other (Comments) 05/07/25 1400   Exposed Structures None 05/07/25 1400   Number of days: 512       Wound 05/01/24 Pressure Injury Ischium Left (Active)   Wound Image   05/07/25 1400   Site Assessment Yellow;Pink;Black 05/07/25 1400   Periwound Assessment Maceration;Scar tissue 05/07/25 1400   Margins Unattached edges 05/07/25 1400   Closure Secondary intention 04/02/25 1559   Drainage Amount Large 05/07/25 1400   Drainage Description Serosanguineous 05/07/25 1400   Treatments Cleansed;Provider debridement 05/07/25 1400   Offloading/DME Other (comment) 05/07/25 1400   Wound Cleansing Hypochlorus Acid 05/07/25 1400   Periwound Protectant No-sting Skin Prep;Moisture Barrier 05/07/25 1400   Dressing Changed Changed 05/07/25 1400   Dressing Cleansing/Solutions Normal Saline 05/07/25 1400   Dressing Options Hydrofera Blue Classic;Hydrofiber;Super Absorbent Pad;Offloading Dressing - Sacral 05/07/25 1400   Dressing Change/Treatment Frequency Monday, Wednesday, Friday, and As Needed 05/07/25 1400   Wound Team Following Weekly 05/07/25 1400   WOUND NURSE ONLY - Pressure Injury Stage 4 05/07/25 1400   Non-staged Wound Description Not applicable 05/07/25 1400   Wound Length (cm) 4 cm 05/07/25 1400   Wound Width (cm) 3.4 cm 05/07/25 1400  "  Wound Depth (cm) 1.2 cm 05/07/25 1400   Wound Surface Area (cm^2) 13.6 cm^2 05/07/25 1400   Wound Volume (cm^3) 16.32 cm^3 05/07/25 1400   Post-Procedure Length (cm) 4.5 cm 05/07/25 1400   Post-Procedure Width (cm) 3.4 cm 05/07/25 1400   Post-Procedure Depth (cm) 1.2 cm 05/07/25 1400   Post-Procedure Surface Area (cm^2) 15.3 cm^2 05/07/25 1400   Post-Procedure Volume (cm^3) 18.36 cm^3 05/07/25 1400   Wound Healing % -7318 05/07/25 1400   Tunneling (cm) 0 cm 05/07/25 1400   Undermining (cm) 1.2 cm 05/07/25 1400   Undermining of Wound, 1st Location From 11 o'clock;To 12 o'clock 05/07/25 1400   Undermining (cm) - 2nd location 0.9 cm 05/07/25 1400   Undermining of Wound, 2nd Location From 5 o'clock;To 7 o'clock 05/07/25 1400   Wound Odor Foul;Other (Comments) 05/07/25 1400   Exposed Structures Muscle 05/07/25 1400   Number of days: 371       PROCEDURE: Excisional debridement of right and left ischial wounds  -2% viscous lidocaine applied topically to wound bed for approximately 5 minutes prior to debridement  -Curette used to debride both wound beds.  Excisional debridement was performed to remove devitalized tissue until healthy, bleeding tissue was visualized.  Total area debrided was 29.34 cm², including into undermined areas of wounds.  Tissue debrided into the muscle / fascia layer.    -Bleeding controlled with manual pressure.  - Hypergranulation tissue treated with silver nitrate  -Wound care completed by wound RN, refer to flowsheet  -Patient tolerated the procedure well, without c/o pain or discomfort.    Pertinent Labs and Diagnostics:    Labs:     A1c: No results found for: \"HBA1C\"     [facility] results, 7/1/2022 (under media tab)    CRP 13    ESR 31      IMAGING:     X-ray left tib-fib ordered 2/16/2024 through [facility]    12/11/2023-CT of abdomen pelvis with contrast  IMPRESSION:   1.  Right ischial decubitus ulcer extending to bone with soft tissue gas tracking along the right perineum. " Appearance suggesting osteomyelitis, consider component of necrotizing fasciitis as clinically appropriate.  2.  Small pericardial effusion  3.  Left adrenal nodule, density on prior noncontrast CT demonstrates adenoma.  4.  Hepatomegaly  5.  Enlarged prostate, workup and evaluation for causes of prostate enlargement recommended as clinically appropriate.  6.  Atherosclerosis and atherosclerotic coronary artery disease    12/15/2023-bone scan of left foot  IMPRESSION:     1.  Mild increased activity in the LEFT 1st and 3rd toes on blood pool and delayed images possibly indicating inflammation/infection.  2.  No significant blood flow asymmetry.          VASCULAR STUDIES: No results found.    LAST  WOUND CULTURE:   Lab Results   Component Value Date/Time    CULTRSULT Usual skin ade 10-50,000 cfu/mL (A) 04/12/2025 09:45 PM    CULTRSULT Proteus mirabilis  ,000 cfu/mL   (A) 04/12/2025 09:45 PM      PATHOLOGY  2/17/2023-bone fragment extracted from left lower extremity wound\\  FINAL DIAGNOSIS:     A. Left leg bone fragment at base of chronic wound:          Extensively degenerated fibrocartilaginous tissue with a rim of           fibrinopurulent debris          Correlate with culture findings            Comment: While no residual intact bone is identified, these           findings are suggestive of adjacent osteomyelitis.      ASSESSMENT AND PLAN:     1. Sacral decubitus ulcer, stage IV ([facility])  Comments: Ulcer first noted in early April 2022 as small open area which quickly enlarged.  This ulcer is present distal from previous sacral ulcer which healed after surgery in January.  Patient has history of flap reconstruction x2 to this area.    5/7/2025: Remains resolved.  - Continue to protect area with pressure relieving sacral foam dressing.  -Patient does spend a lot of time up in his wheelchair, and wishes to continue to do so for his quality of life.  He lives independently, drives, and is involved with family  activities.    -His has a new cushion in his wheelchair.  Has yet to pick out a new wheelchair  - Known OM that was previously treated. CT scan done during recent hospitalization did not show OM of sacrum, however OM of the ischium noted.  -Patient is very well versed in pressure relief strategies  -Monitor site each clinic visit    Wound care: silicone adhesive foam dressing    2. Pressure injury of right ischium, stage 4 ([facility])  Comments: Abscess and OM found on CT during hospitalization in December 2023.  Patient underwent I&D with VAC placement.  IV antibiotics through 1/22/2024.    5/7/2025: Wounds measure about the same, stable.  - Excisional debridement of nonviable tissue from wound in clinic today, medically necessary to promote wound healing  - VAC discontinued previously.  -Patient to return to clinic weekly for assessment, debridement, and dressing change.    -Home health to change dressing 2 times per week in between clinic visits.    -Patient is very well versed on pressure relief measures, has adequate surface on bed, alternating low air loss.  - Continue enluxtra with backing removed for better drainage control.    Wound care: Hydrofera Blue Classic, enluxtra, Silicone foam    3. Pressure injury of left ischium, stage 4 ([facility])    5/7/2025: Wounds measure about the same, stable.  - Excisional debridement of wound in clinic today, medically necessary to promote wound healing.  - Patient to return to clinic weekly for assessment, debridement, and dressing change  -VAC has been discontinued  -Home health to change dressing 2 times per week in between clinic visits.    -Patient has new cushion in his wheelchair, see above  -Patient is very well versed on pressure relief measures, has adequate surface on bed, alternating low air loss.    Wound care: Hydrofera Blue Classic, enluxtra, Silicone foam    4. Other acute osteomyelitis, other site ([facility])    5/7/2025: Bone fragments removed during clinic visit in June  were sent for pathology, polymicrobial, most concerning was an MDR ESBL Proteus.   -Patient completed IV antibiotics.  No further antibiotics at this time per ID  -Patient understands he has a very low threshold for infection/sepsis.  He understands he is to go to the emergency room immediately if he begins to experience fevers, chills, nausea or vomiting, or if he notices radiating erythema or purulent drainage from his wounds.    5. Quadriplegia, C5-C7 incomplete ([facility])  Comments: Complicating factor.  Impaired mobility and sensation  -Patient is still spending 7-8 hours/day up in his wheelchair and knows to reposition frequently.  Wears heel float boots bilaterally at all times  - Patient has new custom wheelchair seat. He had refitting and appears he was not sitting back in chair enough which was causing undue pressure to IT. He is more aware of the correct positioning in chair.    6.  Leakage from urinary catheter, subsequent encounter    5/7/2025:   - Frequency of leakage has decreased  - Following with urology, performing Botox injections  - He discussed ileal conduit with urologist. They will be pursuing other therapies before considering.  - recently Dx with UTI, recently treated with Ertapenem    Please note that this note may have been created using voice recognition software. I have worked with technical experts from [employer] to optimize the interface.  I have made every reasonable attempt to correct obvious errors, but there may be errors of grammar and possibly content that I did not discover before finalizing the note.

## 2025-05-07 NOTE — WOUND TEAM
HH order entered into [facility] and routed to [facility] via [employer].

## 2025-05-11 ENCOUNTER — HOSPITAL ENCOUNTER (EMERGENCY)
Facility: MEDICAL CENTER | Age: 75
End: 2025-05-11
Attending: STUDENT IN AN ORGANIZED HEALTH CARE EDUCATION/TRAINING PROGRAM
Payer: MEDICARE

## 2025-05-11 VITALS
RESPIRATION RATE: 18 BRPM | HEIGHT: 70 IN | WEIGHT: 215 LBS | TEMPERATURE: 98.9 F | SYSTOLIC BLOOD PRESSURE: 120 MMHG | OXYGEN SATURATION: 93 % | BODY MASS INDEX: 30.78 KG/M2 | DIASTOLIC BLOOD PRESSURE: 61 MMHG | HEART RATE: 55 BPM

## 2025-05-11 DIAGNOSIS — T83.9XXA PROBLEM WITH FOLEY CATHETER, INITIAL ENCOUNTER (HCC): ICD-10-CM

## 2025-05-11 PROCEDURE — 51705 CHANGE OF BLADDER TUBE: CPT

## 2025-05-11 PROCEDURE — 99284 EMERGENCY DEPT VISIT MOD MDM: CPT

## 2025-05-11 ASSESSMENT — FIBROSIS 4 INDEX: FIB4 SCORE: 2.55

## 2025-05-11 NOTE — ED TRIAGE NOTES
"Chief Complaint   Patient presents with    Urinary Retention     PT w/ suprapubic catheter presents d/t catheter clogged x 1 day      BP (!) 168/93   Pulse 74   Temp 37.2 °C (98.9 °F) (Temporal)   Resp 14   Ht 1.778 m (5' 10\")   Wt 97.5 kg (215 lb)   SpO2 95%   BMI 30.85 kg/m²     "

## 2025-05-11 NOTE — ED NOTES
Pt wheels himself into the ED today with complaint of blocked suprapubic catheter x 4 hours. Pt states his blood pressure was 200's over 100's about an hour ago. Pt tried to flush and the catheter accepts the flush but nothing comes out.

## 2025-05-11 NOTE — ED NOTES
PT transferred to bed using brigid lift. PT noted that during transfer his catheter began flowing again. PT bladder scanned w/ result of 0 ml's. Suprapubic catheter replaced per MD order. Only trace output noted upon insertion, catheter irrigated w/ 250 ml's total of urine with good return noted (250 ml's urine returned.) MD notified. PT's colostomy bagged emptied, hygiene provided.

## 2025-05-11 NOTE — ED NOTES
Colostomy bag changed. PT verbalizes understanding of discharge paperwork. PT to lobby via personal wheelchair

## 2025-05-11 NOTE — ED PROVIDER NOTES
ED Provider Note    CHIEF COMPLAINT  Chief Complaint   Patient presents with    Urinary Retention     PT w/ suprapubic catheter presents d/t catheter clogged x 1 day        EXTERNAL RECORDS REVIEWED  Outpatient Notes office visit on 4/23/2025 for frequent UTIs, suprapubic catheter, neurogenic urinary bladder disorder, quadriplegia    HPI/ROS  LIMITATION TO HISTORY   Select: : None  OUTSIDE HISTORIAN(S):    Osvaldo Pate is a 74 y.o. male who presents with a suspected blocked urinary catheter patient.  Patient does not experience pain due to quadriplegia in his lower abdomen.  Patient reports that he was recently on antibiotics but not on that at this time.  Patient reports recurrent history of UTIs and suprapubic catheter obstruction.  Patient denies fever chills body aches or sweats.    PAST MEDICAL HISTORY   has a past medical history of A-fib ([facility]), Acute cystitis without hematuria (10/23/2021), Acute UTI (06/18/2023), Arrhythmia, Atrial flutter ([facility]), Blood clotting disorder ([facility]), Bowel habit changes, Clot hematuria (01/23/2023), GERD (gastroesophageal reflux disease), Hypertension, Hypokalemia (06/18/2023), Kidney stones, Metabolic acidosis (06/18/2023), Neurogenic bladder, Open wound (11/18/2022), Quadriplegia, C5-C7 complete ([facility]), Sepsis secondary to UTI ([facility]) (06/17/2023), SIRS (systemic inflammatory response syndrome) ([facility]) (12/12/2023), and Suprapubic catheter ([facility]).    SURGICAL HISTORY   has a past surgical history that includes percutaneouospinning lower extremity; colostomy; colostomy takedown; colostomy (N/A, 07/27/2019); ulcer debridement (N/A, 08/21/2019); flap closure (08/21/2019); hernia repair; wound irrigation & debridement (12/20/2020); cystoscopy,insert ureteral stent (Left, 12/16/2021); cysto/uretero/pyeloscopy, dx (Left, 12/16/2021); lasertripsy (Left, 12/16/2021); cystoscopy,insert ureteral stent (Left, 01/04/2022); cysto/uretero/pyeloscopy, dx (Left, 01/04/2022); lasertripsy  (N/A, 2022); incision and drainage orthopedic (2022); incision and drainage orthopedic (Left, 2022); bone biopsy (Left, 2022); wound irrigation & debridement (Left, 2022); wound closure (Left, 2022); orthopedic osteotomy (Left, 2022); orif, ankle; and wound irrigation & debridement (Right, 2023).    FAMILY HISTORY  Family History   Problem Relation Age of Onset    Heart Disease Father        SOCIAL HISTORY  Social History     Tobacco Use    Smoking status: Former     Current packs/day: 0.00     Average packs/day: 1 pack/day for 10.0 years (10.0 ttl pk-yrs)     Types: Cigarettes     Start date: 1967     Quit date: 1977     Years since quittin.3    Smokeless tobacco: Never   Vaping Use    Vaping status: Never Used   Substance and Sexual Activity    Alcohol use: Yes     Alcohol/week: 4.2 oz     Types: 7 Standard drinks or equivalent per week     Comment: 2/day    Drug use: No    Sexual activity: Not on file       CURRENT MEDICATIONS  Home Medications       Reviewed by Gee Dudley R.N. (Registered Nurse) on 25 at 1426  Med List Status: Not Addressed     Medication Last Dose Status   acetaminophen (TYLENOL) 500 MG Tab  Active   amLODIPine (NORVASC) 2.5 MG Tab  Active   amLODIPine (NORVASC) 5 MG Tab  Active   Ascorbic Acid (VITAMIN C) 1000 MG Tab  Active   aspirin EC 81 MG EC tablet  Active   baclofen (LIORESAL) 20 MG tablet  Active   Cholecalciferol (VITAMIN D3) 2000 UNIT Cap  Active   diclofenac sodium (VOLTAREN) 1 % Gel  Active   docusate sodium (COLACE) 100 MG Cap  Active   ferrous sulfate 325 (65 Fe) MG tablet  Active   losartan (COZAAR) 50 MG Tab  Active   Magnesium 400 MG Tab  Active   melatonin 5 mg Tab  Active   methenamine hip (HIPPREX) 1 GM Tab  Active   NON SPECIFIED  Active   NON SPECIFIED  Active   omeprazole (PRILOSEC) 20 MG delayed-release capsule  Active   polyethylene glycol 3350 (MIRALAX) 17 GM/SCOOP Powder  Active   Probiotic  "Product (PROBIOTIC PO)  Active   sennosides (SENOKOT) 8.6 MG Tab  Active   Simethicone (GAS-X PO)  Active   Sodium Hypochlorite (DAKINS 0.125%, 1/4 STRENGTH,) 0.125 % Solution  Active   solifenacin (VESICARE) 10 MG tablet  Active   Zinc 50 MG Tab  Active                    ALLERGIES  Allergies   Allergen Reactions    Sulfa Drugs Rash     Rash, developed this back in 2015 after being placed on \"sulfa antibiotic for my wound\". Antibiotic was stopped and rash went away. Patient states he had a sulfa antibiotic prior to that time back when he was younger w/o a reaction.          PHYSICAL EXAM  VITAL SIGNS: /61   Pulse (!) 55   Temp 37.2 °C (98.9 °F) (Temporal)   Resp 18   Ht 1.778 m (5' 10\")   Wt 97.5 kg (215 lb)   SpO2 93%   BMI 30.85 kg/m²    Vitals and nursing note reviewed.   Constitutional:       Comments: Patient is lying in bed supine, pleasant, conversant, speaking in complete sentences   HENT:      Head: Normocephalic and atraumatic.   Eyes:      Extraocular Movements: Extraocular movements intact.      Conjunctiva/sclera: Conjunctivae normal.      Pupils: Pupils are equal, round, and reactive to light.   Cardiovascular:      Pulses: Normal pulses.      Comments: HR 74  Pulmonary:      Effort: Pulmonary effort is normal. No respiratory distress.   Abdominal:      Comments: Abdomen is soft, nondistended, superior catheter in place with some surrounding debris no erythema, fluctuance, crepitus, full ostomy without surrounding erythema  Musculoskeletal:      Baseline quadriplegia  Skin:     General: Skin is warm and dry.      Capillary Refill: Capillary refill takes less than 2 seconds.   Neurological:      Mental Status: Alert.  Baseline quadriplegia    COURSE & MEDICAL DECISION MAKING    ASSESSMENT, COURSE AND PLAN  Care Narrative: Catheter will be replaced at this time.  Patient ostomy is full and will be changed out.  Patient denies fevers, no evidence of sepsis.  Abdomen is nonrigid.  Acute " intra-abdominal infection inconsistent with patient presentation at this time.      Electronically signed by: Gee Ochoa M.D., 5/11/2025 2:02 PM    Patient has had ostomy and catheter placed.  Patient feeling much better at this time.  Blood pressure has normalized.  Patient counseled to return if any of his symptoms worsen.  Given the patient has no back pain, no fever, no tachycardia, no signs of infection and the fact that his current suprapubic catheter is likely colonized with bacteria, I do not believe the utility of urinalysis or treating chronic colonization of patient's catheter is beneficial to the patient at this time.  However if patient worsens he should come back and we can perform testing and treatment at that time.    Repeat physical exam benign.  I doubt any serious emergency process at this time.  Patient and/or family, friends given strict return precautions for worsening symptoms and care instructions. They have demonstrated understanding of discharge instructions through teach back mechanism. Advised PCP follow-up in 1-2 days.  Patient/family/friend expresses understanding and agrees to plan.    This dictation has been created using voice recognition software. I am continuously working with the software to minimize the number of voice recognition errors and I have made every attempt to manually correct the errors within my dictation. However errors  related to this voice recognition software may still exist and should be interpreted within the appropriate context.     Electronically signed by: Gee Ochoa M.D., 5/11/2025 4:52 PM    DISPOSITION AND DISCUSSIONS    Escalation of care considered, and ultimately not performed:diagnostic imaging    Decision tools and prescription drugs considered including, but not limited to: Antibiotics not indicated in the absence of bacterial infection .    FINAL DIAGNOSIS  1. Problem with Cazares catheter, initial encounter ([facility])          Electronically signed by: Gee Ochoa M.D., 5/11/2025 1:51 PM

## 2025-05-14 ENCOUNTER — OFFICE VISIT (OUTPATIENT)
Dept: WOUND CARE | Facility: MEDICAL CENTER | Age: 75
End: 2025-05-14
Payer: MEDICARE

## 2025-05-14 DIAGNOSIS — L89.154 SACRAL DECUBITUS ULCER, STAGE IV (HCC): ICD-10-CM

## 2025-05-14 DIAGNOSIS — G82.54 QUADRIPLEGIA, C5-C7, INCOMPLETE (HCC): ICD-10-CM

## 2025-05-14 DIAGNOSIS — L89.314 PRESSURE INJURY OF RIGHT ISCHIUM, STAGE 4 (HCC): Primary | ICD-10-CM

## 2025-05-14 DIAGNOSIS — M86.18 OTHER ACUTE OSTEOMYELITIS, OTHER SITE (HCC): ICD-10-CM

## 2025-05-14 DIAGNOSIS — L89.324 PRESSURE INJURY OF LEFT ISCHIUM, STAGE 4 (HCC): ICD-10-CM

## 2025-05-14 PROCEDURE — 11046 DBRDMT MUSC&/FSCA EA ADDL: CPT

## 2025-05-14 PROCEDURE — 11043 DBRDMT MUSC&/FSCA 1ST 20/<: CPT

## 2025-05-14 PROCEDURE — 11046 DBRDMT MUSC&/FSCA EA ADDL: CPT | Performed by: STUDENT IN AN ORGANIZED HEALTH CARE EDUCATION/TRAINING PROGRAM

## 2025-05-14 PROCEDURE — 11043 DBRDMT MUSC&/FSCA 1ST 20/<: CPT | Performed by: STUDENT IN AN ORGANIZED HEALTH CARE EDUCATION/TRAINING PROGRAM

## 2025-05-15 NOTE — PROGRESS NOTES
Provider Encounter- Pressure Injury        HISTORY OF PRESENT ILLNESS  Wound History:    START OF CARE IN CLINIC: 1/31/2024 (return to clinic after hospitalization)    REFERRING PROVIDER: Racquel York       WOUNDS-superior coccyx pressure injury, stage IV-resolved                                 Coccyx pressure injury, stage IV-resolved           Inferior coccyx pressure injury, stage IV resolved                                 Right ischial pressure injury, stage IV                                 Left heel pressure injury, recurring stage III-resolved                                 Left posterior lower leg/calf-stage III-resolved                                 Left medial lower leg surgical wound dehiscence-resolved, reopens frequently-resolved            Left ischial pressure injury, stage IV-first observed in clinic 5/1/2024          HISTORY: Patient with history of incomplete quadriplegia referred to [facility] for treatment of a stage IV pressure injury.  He has a history of previous pressure injuries to this area, and underwent muscle flaps in 2019, and then again in 2020.  He was seen in the wound clinic in November 2021 for an ulcer proximal from his current ulcer, and pressure injuries to his left posterior lower leg and left heel.  At that time, it was discovered that the patient had retained VAC foam embedded in the wound bed of the sacral wound.  Attempts were made to get him back to his plastic surgeon, though unsuccessful.  In January he underwent surgical removal of VAC sponge along with excisional debridement of his sacral wound by Dr. Chaves.  After the surgery, his wound went on to heal without incident.   In early April 2022, his home health nurse noted a new sacral ulcer, below the previous ulcer which quickly tripled in size over the following weeks.  The ulcer to his left medial lower leg had also deteriorated, with bone visible at the base..  He was hospitalized from 4/22 until 4/27/2022 and  underwent surgery with Dr. Raman on 4/26 for irrigation and debridement of multiple compartments of the left lower extremity, bone excision, and complex closure of chronic wound using biologic skin substitute.   His sacrococcygeal wound was not surgically addressed during this admission.  He was discharged back to his group home, with home health, and referral to outpatient wound clinic for his sacral wound.  He was instructed to follow-up with his surgeon for his lower leg wound.       Postoperatively, the left medial lower leg incision dehisced.  He was seen by his surgeon at [facility] on 5/11.  The surgeon opted to leave remaining sutures in place, and refer him to the wound clinic for treatment of this wound.   Treatment of this wound was initiated in clinic on 5/12.  During this visit was also noted that his heel DTI had resolved, but that he had a new pressure injury to his left posterior lower extremity.     A new pressure injury was noted to patient's right upper buttock/lower back on 5/20/2022.  Wound was linear in shape, skin discolored but intact.     Abrasion noted to left anterior lower leg.  First observed in clinic on 7/22/2022.  Patient states he bumped his leg into his food tray.     Small DTI noted to patient's left lateral lower leg on 7/29/2022.  Skin intact but discolored.     Large area of deep tissue injury noted to patient's left exterior lower leg.  Patient denied any trauma to this area.  Skin intact.  Wound documented.    1/27/2023: Patient was admitted to [facility] from 1/23-1/25/2023 with gross hematuria. He underwent RICHARD which showed watchman device was in place and he was taken off of Xarelto. While hospitalized wound team was consulted. He was referred back to [facility] [place] and home health upon discharge.    Patient was hospitalized at [facility] for pyelonephritis from 2/26 until 3/2/2023, admitted for fever and general malaise.  He was admitted and initially started on linezolid and meropenem for suspected  UTI and history of multidrug-resistant organisms.  Urine cultures were negative. ID was consulted, recommended CT of chest and abdomen,which were negative for acute findings. However, he was treated with 5 days total course of antibiotics for suspected UTI, and symptoms completely resolved.  During this admission, the inpatient wound team was consulted for treatment of his sacral and lower leg wounds.  A wound culture was taken from his left heel pressure ulcer, negative.  Once stabilized, he was discharged home and referred back to [facility] to resume treatment of his wounds.    Patient was hospitalized at [facility] from 12/11 until 12/23/2023, admitted for fever.  Wound infection suspected.  CT scan of abdomen and pelvis for evaluation of sacral pressure injury showed gas tracking down to the bone consistent with osteomyelitis.  He underwent I&D of right ischial ulcer (documented as buttock) with Dr. Bansal, medial tract leading to an abscess was identified.  Cavity was opened allowing it to drain into the main wound bed.  Wound VAC was placed and managed by wound team during this admission.  A bone scan of patient's left foot was also done, initially concerning for osteomyelitis.  Orthopedic surgery was consulted and did not recommend surgical intervention. ID consulted also, recommended the patient to receive IV ertapenem 1 g every 24 hours plus IV daptomycin 8 mg/kg every 24 hours through 1/22/2024.   He was discharged to [facility] on 1/23 for IV antibiotics and wound care.  From the LTAC he was discharged home on 1/22 with home health and referral back to [facility] to resume management of his wounds      Pertinent Medical History: Incomplete quadriplegia, history of stage IV pressure injuries, history of flap procedures to sacral pressure injuries, osteomyelitis, obesity, colostomy in place   Contributing factors: Immobility and Obesity, impaired sensation    Personal support: Attendant-staff at senior living and home health  nursing    TOBACCO USE:   Former smoker, quit in 1977.  Never used smokeless tobacco    Patient's problem list, allergies, and current medications reviewed and updated in Epic    Interval History:  Interval History thinned 7/29/2022.  Please see previous notes for complete interval history.   Interval History thinned 1/27/2023. Please see previous note for complete interval history.  Interval History thinned 3/3/2023.  Please see previous notes for complete interval history.    Interval History thinned 8/4/2023.  Please see previous notes for complete interval history.    Interval History thinned 1/31/2024.  Please see previous notes for complete interval history.    Interval History thinned 6/26/2024.  Please see previous notes for complete interval history.  Interval History thinned 4/29/2025.  Please see previous notes for complete interval history.         4/23/2025: Clinic visit with Steve Hodges MD. Patient spent more time in chair this week with Easter and wounds slightly larger though measurements are fairly positional. Less maceration and saturation of cover dressing with enluxtra, will continue.    4/30/2025: Clinic visit with Steve Hodges MD. Patient reports doing ok. Denies any acute issues. Wounds stable, continues to have heavy drainage. Will hold enluxtra today and change to superabsorbent pad to see if will manage drainage better.    5/7/2025: Clinic visit with Steve Hodges MD. Patient feels that drainage is adequately managed with current dressing. He denies any acute issues. Wounds stable.    5/14/2025: Clinic visit with Steve Hodges MD. Patient reports doing ok, denies any fevers or chills. Wounds stable. He feels that drainage has been adequately managed. He was seen in ED due to occlusion of catheter due to sediment. Recently completed treatment for UTI.    REVIEW OF SYSTEMS:   Unchanged from previous wound clinic assessment on 5/7/2025, except as noted in interval history above.       PHYSICAL EXAMINATION:   There were no vitals taken for this visit.  Physical Exam  Constitutional:       Appearance: He is obese.   Cardiovascular:      Rate and Rhythm: Normal rate.   Pulmonary:      Effort: Pulmonary effort is normal.   Abdominal:      Comments: Colostomy left lower quadrant   Genitourinary:     Comments: Suprapubic catheter to down drain   Skin:     Comments: Stage IV pressure injury to right ischium: Wound stable, continued heavy drainage. Thin layer of slough to wound bed.  No odor. No periwound erythema or induration    Stage IV pressure injury of left ischium: Wound stable. Thin layer of slough to wound bed. Heavy serous drainage with no odor today. No evidence of soft tissue infection    Stage IV pressure injury sacrum: Remains resolved    All other wounds remain healed, high risk for recurrence     Neurological:      Mental Status: He is alert and oriented to person, place, and time.   Psychiatric:         Mood and Affect: Mood normal.         WOUND ASSESSMENT  Wound 12/12/23 Pressure Injury Ischium Right (Active)   Wound Image    05/14/25 1705   Site Assessment Pink;Red;Yellow 05/14/25 1705   Periwound Assessment Maceration 05/14/25 1705   Margins Attached edges;Unattached edges 05/14/25 1705   Closure Secondary intention 04/02/25 1559   Drainage Amount Large 05/14/25 1705   Drainage Description Serosanguineous 05/14/25 1705   Treatments Cleansed;Provider debridement;Site care 05/14/25 1705   Offloading/DME Offloading cushion;Sacral offloading dressing 05/14/25 1705   ** Retired** Wound Cleansing Hypochlorus Acid 05/07/25 1400   Wound Cleansing Hypochlorus Acid;Foam Cleanser/Washcloth 05/14/25 1705   Periwound Protectant Skin Protectant Wipes to Periwound;Silicone barrier cream 05/14/25 1705   Dressing Changed Changed 05/07/25 1400   Dressing Cleansing/Solutions Normal Saline 05/14/25 1705   Dressing Options Hydrofera Blue Classic;Hydrofiber;Super Absorbent Pad;Sacral Dressing 05/14/25  1705   Dressing Change/Treatment Frequency Monday, Wednesday, Friday, and As Needed 05/14/25 1705   WOUND NURSE ONLY - Pressure Injury Stage 4 05/14/25 1705   Non-staged Wound Description Not applicable 04/02/25 1559   Wound Length (cm) 6 cm 05/14/25 1705   Wound Width (cm) 2.5 cm 05/14/25 1705   Wound Depth (cm) 0.3 cm 05/14/25 1705   Wound Surface Area (cm^2) 11.78 cm^2 05/14/25 1705   Wound Volume (cm^3) 2.356 cm^3 05/14/25 1705   Post-Procedure Length (cm) 6 cm 05/14/25 1705   Post-Procedure Width (cm) 2.6 cm 05/14/25 1705   Post-Procedure Depth (cm) 0.3 cm 05/14/25 1705   Post-Procedure Surface Area (cm^2) 12.25 cm^2 05/14/25 1705   Post-Procedure Volume (cm^3) 2.45 cm^3 05/14/25 1705   Wound Healing % 96 05/14/25 1705   Tunneling (cm) 0.5 cm 05/14/25 1705   Tunneling Clock Position of Wound 6 05/14/25 1705   Undermining (cm) 0.6 cm 05/07/25 1400   Undermining of Wound, 1st Location From 12 o'clock;To 7 o'clock 05/07/25 1400   Undermining (cm) - 2nd location 0.5 cm 02/19/25 1500   Undermining of Wound, 2nd Location From 7 o'clock;From 12 o'clock;To 2 o'clock 02/19/25 1500   Wound Odor None 05/14/25 1705   Exposed Structures None 05/14/25 1705   Number of days: 520       Wound 05/01/24 Pressure Injury Ischium Left (Active)   Wound Image    05/14/25 1705   Site Assessment Pink;Red;Yellow 05/14/25 1705   Periwound Assessment Maceration;Scar tissue 05/14/25 1705   Margins Unattached edges 05/14/25 1705   Closure Secondary intention 04/02/25 1559   Drainage Amount Large 05/14/25 1705   Drainage Description Serosanguineous 05/14/25 1705   Treatments Cleansed;Provider debridement;Site care 05/14/25 1705   Offloading/DME Sacral offloading dressing;Offloading cushion 05/14/25 1705   ** Retired** Wound Cleansing Hypochlorus Acid 05/07/25 1400   Wound Cleansing Hypochlorus Acid;Foam Cleanser/Washcloth 05/14/25 1705   Periwound Protectant Skin Protectant Wipes to Periwound;Silicone barrier cream 05/14/25 1705   Dressing  Changed Changed 05/07/25 1400   Dressing Cleansing/Solutions Normal Saline 05/14/25 1705   Dressing Options Hydrofera Blue Classic;Hydrofiber;Super Absorbent Pad;Sacral Dressing 05/14/25 1705   Dressing Change/Treatment Frequency Monday, Wednesday, Friday, and As Needed 05/14/25 1705   Wound Team Following Weekly 05/07/25 1400   WOUND NURSE ONLY - Pressure Injury Stage 4 05/14/25 1705   Non-staged Wound Description Not applicable 05/07/25 1400   Wound Length (cm) 4 cm 05/14/25 1705   Wound Width (cm) 3.1 cm 05/14/25 1705   Wound Depth (cm) 1.7 cm 05/14/25 1705   Wound Surface Area (cm^2) 9.74 cm^2 05/14/25 1705   Wound Volume (cm^3) 11.037 cm^3 05/14/25 1705   Post-Procedure Length (cm) 4 cm 05/14/25 1705   Post-Procedure Width (cm) 3.1 cm 05/14/25 1705   Post-Procedure Depth (cm) 1.8 cm 05/14/25 1705   Post-Procedure Surface Area (cm^2) 9.74 cm^2 05/14/25 1705   Post-Procedure Volume (cm^3) 11.687 cm^3 05/14/25 1705   Wound Healing % -4917 05/14/25 1705   Tunneling (cm) 0 cm 05/14/25 1705   Undermining (cm) 0 cm 05/14/25 1705   Undermining of Wound, 1st Location From 11 o'clock;To 12 o'clock 05/07/25 1400   Undermining (cm) - 2nd location 0 cm 05/14/25 1705   Undermining of Wound, 2nd Location From 5 o'clock;To 7 o'clock 05/07/25 1400   Wound Odor None 05/14/25 1705   Exposed Structures Fascia 05/14/25 1705   Number of days: 379       PROCEDURE: Excisional debridement of right and left ischial wounds  -2% viscous lidocaine applied topically to wound bed for approximately 5 minutes prior to debridement  -Curette used to debride both wound beds.  Excisional debridement was performed to remove devitalized tissue until healthy, bleeding tissue was visualized.  Total area debrided was 21.99 cm², including into undermined areas of wounds.  Tissue debrided into the muscle / fascia layer.    -Bleeding controlled with manual pressure.  -Wound care completed by wound RN, refer to flowsheet  -Patient tolerated the procedure  "well, without c/o pain or discomfort.    Pertinent Labs and Diagnostics:    Labs:     A1c: No results found for: \"HBA1C\"     [facility] results, 7/1/2022 (under media tab)    CRP 13    ESR 31      IMAGING:     X-ray left tib-fib ordered 2/16/2024 through [facility]    12/11/2023-CT of abdomen pelvis with contrast  IMPRESSION:   1.  Right ischial decubitus ulcer extending to bone with soft tissue gas tracking along the right perineum. Appearance suggesting osteomyelitis, consider component of necrotizing fasciitis as clinically appropriate.  2.  Small pericardial effusion  3.  Left adrenal nodule, density on prior noncontrast CT demonstrates adenoma.  4.  Hepatomegaly  5.  Enlarged prostate, workup and evaluation for causes of prostate enlargement recommended as clinically appropriate.  6.  Atherosclerosis and atherosclerotic coronary artery disease    12/15/2023-bone scan of left foot  IMPRESSION:     1.  Mild increased activity in the LEFT 1st and 3rd toes on blood pool and delayed images possibly indicating inflammation/infection.  2.  No significant blood flow asymmetry.          VASCULAR STUDIES: No results found.    LAST  WOUND CULTURE:   Lab Results   Component Value Date/Time    CULTRSULT Usual skin ade 10-50,000 cfu/mL (A) 04/12/2025 09:45 PM    CULTRSULT Proteus mirabilis  ,000 cfu/mL   (A) 04/12/2025 09:45 PM      PATHOLOGY  2/17/2023-bone fragment extracted from left lower extremity wound\\  FINAL DIAGNOSIS:     A. Left leg bone fragment at base of chronic wound:          Extensively degenerated fibrocartilaginous tissue with a rim of           fibrinopurulent debris          Correlate with culture findings            Comment: While no residual intact bone is identified, these           findings are suggestive of adjacent osteomyelitis.      ASSESSMENT AND PLAN:     1. Sacral decubitus ulcer, stage IV ([facility])  Comments: Ulcer first noted in early April 2022 as small open area which quickly " enlarged.  This ulcer is present distal from previous sacral ulcer which healed after surgery in January.  Patient has history of flap reconstruction x2 to this area.    5/14/2025: Remains resolved.  - Continue to protect area with pressure relieving sacral foam dressing.  -Patient does spend a lot of time up in his wheelchair, and wishes to continue to do so for his quality of life.  He lives independently, drives, and is involved with family activities.    -His has a new cushion in his wheelchair.  Has yet to pick out a new wheelchair  - Known OM that was previously treated. CT scan done during recent hospitalization did not show OM of sacrum, however OM of the ischium noted.  -Patient is very well versed in pressure relief strategies  -Monitor site each clinic visit    Wound care: silicone adhesive foam dressing    2. Pressure injury of right ischium, stage 4 ([facility])  Comments: Abscess and OM found on CT during hospitalization in December 2023.  Patient underwent I&D with VAC placement.  IV antibiotics through 1/22/2024.    5/14/2025: Wounds measure about the same, stable.  - Excisional debridement of nonviable tissue from wound in clinic today, medically necessary to promote wound healing  - VAC discontinued previously.  -Patient to return to clinic weekly for assessment, debridement, and dressing change.    -Home health to change dressing 2 times per week in between clinic visits.    -Patient is very well versed on pressure relief measures, has adequate surface on bed, alternating low air loss.  - Continue enluxtra with backing removed for better drainage control.    Wound care: Hydrofera Blue Classic, enluxtra, Silicone foam    3. Pressure injury of left ischium, stage 4 ([facility])    5/14/2025: Wounds measure about the same, stable.  - Excisional debridement of wound in clinic today, medically necessary to promote wound healing.  - Patient to return to clinic weekly for assessment, debridement, and dressing  change  -VAC has been discontinued  -Home health to change dressing 2 times per week in between clinic visits.    -Patient has new cushion in his wheelchair, see above  -Patient is very well versed on pressure relief measures, has adequate surface on bed, alternating low air loss.    Wound care: Hydrofera Blue Classic, enluxtra, Silicone foam    4. Other acute osteomyelitis, other site ([facility])    5/7/2025: Bone fragments removed during clinic visit in June were sent for pathology, polymicrobial, most concerning was an MDR ESBL Proteus.   -Patient completed IV antibiotics.  No further antibiotics at this time per ID  -Patient understands he has a very low threshold for infection/sepsis.  He understands he is to go to the emergency room immediately if he begins to experience fevers, chills, nausea or vomiting, or if he notices radiating erythema or purulent drainage from his wounds.    5. Quadriplegia, C5-C7 incomplete ([facility])  Comments: Complicating factor.  Impaired mobility and sensation  -Patient is still spending 7-8 hours/day up in his wheelchair and knows to reposition frequently.  Wears heel float boots bilaterally at all times  - Patient has new custom wheelchair seat. He had refitting and appears he was not sitting back in chair enough which was causing undue pressure to IT. He is more aware of the correct positioning in chair.    Please note that this note may have been created using voice recognition software. I have worked with technical experts from [employer] to optimize the interface.  I have made every reasonable attempt to correct obvious errors, but there may be errors of grammar and possibly content that I did not discover before finalizing the note.

## 2025-05-15 NOTE — PATIENT INSTRUCTIONS
Keep dressings clean and dry. Change dressings if they become over saturated, soiled or fall off.     On the days you are not changing your dressings, avoid getting the dressings wet. It is not harmful to get your wound wet when you are washing your wound before applying a new dressing.    If you need to change your dressings at home: Wash your wound with normal saline, wound cleanser, or unscented soap and water prior to applying your new dressings. Please avoid cleansing with hydrogen peroxide or rubbing alcohol. Hydrogen peroxide and rubbing alcohol are toxic to new tissue and skin cells.    Should you experience any significant changes in your wound(s), such as signs of infection (increasing redness, swelling, localized heat, increased pain, fever > 101 F, chills) or have any questions regarding your home care instructions, please contact the wound center at [phone]. If after hours, contact your primary care physician or go to the hospital emergency room.     If you are admitted to any hospital, you will need a new referral to come back to the wound clinic and any scheduled appointments that you already have, may be cancelled.     If you are 5 or more minutes late for an appointment, we reserve the right to cancel and reschedule that appointment. Additionally, if you are habitually late or not showing (3 late cancellations and/or no shows), we reserve the right to cancel your remaining appointments and it will be your responsibility to obtain a new referral if services are still needed.

## 2025-05-21 ENCOUNTER — OFFICE VISIT (OUTPATIENT)
Dept: WOUND CARE | Facility: MEDICAL CENTER | Age: 75
End: 2025-05-21
Payer: MEDICARE

## 2025-05-21 DIAGNOSIS — L89.314 PRESSURE INJURY OF RIGHT ISCHIUM, STAGE 4 (HCC): ICD-10-CM

## 2025-05-21 DIAGNOSIS — L89.324 PRESSURE INJURY OF LEFT ISCHIUM, STAGE 4 (HCC): Primary | ICD-10-CM

## 2025-05-21 DIAGNOSIS — M86.18 OTHER ACUTE OSTEOMYELITIS, OTHER SITE (HCC): ICD-10-CM

## 2025-05-21 DIAGNOSIS — G82.54 QUADRIPLEGIA, C5-C7, INCOMPLETE (HCC): ICD-10-CM

## 2025-05-21 DIAGNOSIS — L89.154 SACRAL DECUBITUS ULCER, STAGE IV (HCC): ICD-10-CM

## 2025-05-21 PROCEDURE — 99213 OFFICE O/P EST LOW 20 MIN: CPT

## 2025-05-21 PROCEDURE — 11046 DBRDMT MUSC&/FSCA EA ADDL: CPT | Performed by: STUDENT IN AN ORGANIZED HEALTH CARE EDUCATION/TRAINING PROGRAM

## 2025-05-21 PROCEDURE — 11046 DBRDMT MUSC&/FSCA EA ADDL: CPT

## 2025-05-21 PROCEDURE — 99214 OFFICE O/P EST MOD 30 MIN: CPT

## 2025-05-21 PROCEDURE — 11043 DBRDMT MUSC&/FSCA 1ST 20/<: CPT | Performed by: STUDENT IN AN ORGANIZED HEALTH CARE EDUCATION/TRAINING PROGRAM

## 2025-05-21 PROCEDURE — 11043 DBRDMT MUSC&/FSCA 1ST 20/<: CPT

## 2025-05-21 NOTE — PROGRESS NOTES
Provider Encounter- Pressure Injury        HISTORY OF PRESENT ILLNESS  Wound History:    START OF CARE IN CLINIC: 1/31/2024 (return to clinic after hospitalization)    REFERRING PROVIDER: Racquel York       WOUNDS-superior coccyx pressure injury, stage IV-resolved                                 Coccyx pressure injury, stage IV-resolved           Inferior coccyx pressure injury, stage IV resolved                                 Right ischial pressure injury, stage IV                                 Left heel pressure injury, recurring stage III-resolved                                 Left posterior lower leg/calf-stage III-resolved                                 Left medial lower leg surgical wound dehiscence-resolved, reopens frequently-resolved            Left ischial pressure injury, stage IV-first observed in clinic 5/1/2024          HISTORY: Patient with history of incomplete quadriplegia referred to [facility] for treatment of a stage IV pressure injury.  He has a history of previous pressure injuries to this area, and underwent muscle flaps in 2019, and then again in 2020.  He was seen in the wound clinic in November 2021 for an ulcer proximal from his current ulcer, and pressure injuries to his left posterior lower leg and left heel.  At that time, it was discovered that the patient had retained VAC foam embedded in the wound bed of the sacral wound.  Attempts were made to get him back to his plastic surgeon, though unsuccessful.  In January he underwent surgical removal of VAC sponge along with excisional debridement of his sacral wound by Dr. Chaves.  After the surgery, his wound went on to heal without incident.   In early April 2022, his home health nurse noted a new sacral ulcer, below the previous ulcer which quickly tripled in size over the following weeks.  The ulcer to his left medial lower leg had also deteriorated, with bone visible at the base..  He was hospitalized from 4/22 until 4/27/2022 and  underwent surgery with Dr. Raman on 4/26 for irrigation and debridement of multiple compartments of the left lower extremity, bone excision, and complex closure of chronic wound using biologic skin substitute.   His sacrococcygeal wound was not surgically addressed during this admission.  He was discharged back to his group home, with home health, and referral to outpatient wound clinic for his sacral wound.  He was instructed to follow-up with his surgeon for his lower leg wound.       Postoperatively, the left medial lower leg incision dehisced.  He was seen by his surgeon at [facility] on 5/11.  The surgeon opted to leave remaining sutures in place, and refer him to the wound clinic for treatment of this wound.   Treatment of this wound was initiated in clinic on 5/12.  During this visit was also noted that his heel DTI had resolved, but that he had a new pressure injury to his left posterior lower extremity.     A new pressure injury was noted to patient's right upper buttock/lower back on 5/20/2022.  Wound was linear in shape, skin discolored but intact.     Abrasion noted to left anterior lower leg.  First observed in clinic on 7/22/2022.  Patient states he bumped his leg into his food tray.     Small DTI noted to patient's left lateral lower leg on 7/29/2022.  Skin intact but discolored.     Large area of deep tissue injury noted to patient's left exterior lower leg.  Patient denied any trauma to this area.  Skin intact.  Wound documented.    1/27/2023: Patient was admitted to [facility] from 1/23-1/25/2023 with gross hematuria. He underwent RICHARD which showed watchman device was in place and he was taken off of Xarelto. While hospitalized wound team was consulted. He was referred back to [facility] and home health upon discharge.    Patient was hospitalized at [facility] for pyelonephritis from 2/26 until 3/2/2023, admitted for fever and general malaise.  He was admitted and initially started on linezolid and meropenem for suspected  UTI and history of multidrug-resistant organisms.  Urine cultures were negative. ID was consulted, recommended CT of chest and abdomen,which were negative for acute findings. However, he was treated with 5 days total course of antibiotics for suspected UTI, and symptoms completely resolved.  During this admission, the inpatient wound team was consulted for treatment of his sacral and lower leg wounds.  A wound culture was taken from his left heel pressure ulcer, negative.  Once stabilized, he was discharged home and referred back to [facility] to resume treatment of his wounds.    Patient was hospitalized at [facility] from 12/11 until 12/23/2023, admitted for fever.  Wound infection suspected.  CT scan of abdomen and pelvis for evaluation of sacral pressure injury showed gas tracking down to the bone consistent with osteomyelitis.  He underwent I&D of right ischial ulcer (documented as buttock) with Dr. Bansal, medial tract leading to an abscess was identified.  Cavity was opened allowing it to drain into the main wound bed.  Wound VAC was placed and managed by wound team during this admission.  A bone scan of patient's left foot was also done, initially concerning for osteomyelitis.  Orthopedic surgery was consulted and did not recommend surgical intervention. ID consulted also, recommended the patient to receive IV ertapenem 1 g every 24 hours plus IV daptomycin 8 mg/kg every 24 hours through 1/22/2024.   He was discharged to [facility] on 1/23 for IV antibiotics and wound care.  From the LTAC he was discharged home on 1/22 with home health and referral back to [facility] to resume management of his wounds      Pertinent Medical History: Incomplete quadriplegia, history of stage IV pressure injuries, history of flap procedures to sacral pressure injuries, osteomyelitis, obesity, colostomy in place   Contributing factors: Immobility and Obesity, impaired sensation    Personal support: Attendant-staff at intermediate and home health  nursing    TOBACCO USE:   Former smoker, quit in 1977.  Never used smokeless tobacco    Patient's problem list, allergies, and current medications reviewed and updated in Epic    Interval History:  Interval History thinned 7/29/2022.  Please see previous notes for complete interval history.   Interval History thinned 1/27/2023. Please see previous note for complete interval history.  Interval History thinned 3/3/2023.  Please see previous notes for complete interval history.    Interval History thinned 8/4/2023.  Please see previous notes for complete interval history.    Interval History thinned 1/31/2024.  Please see previous notes for complete interval history.    Interval History thinned 6/26/2024.  Please see previous notes for complete interval history.  Interval History thinned 4/29/2025.  Please see previous notes for complete interval history.         4/23/2025: Clinic visit with Steve Hodges MD. Patient spent more time in chair this week with Easter and wounds slightly larger though measurements are fairly positional. Less maceration and saturation of cover dressing with enluxtra, will continue.    4/30/2025: Clinic visit with Steve Hodges MD. Patient reports doing ok. Denies any acute issues. Wounds stable, continues to have heavy drainage. Will hold enluxtra today and change to superabsorbent pad to see if will manage drainage better.    5/7/2025: Clinic visit with Steve Hodges MD. Patient feels that drainage is adequately managed with current dressing. He denies any acute issues. Wounds stable.    5/14/2025: Clinic visit with tSeve Hogdes MD. Patient reports doing ok, denies any fevers or chills. Wounds stable. He feels that drainage has been adequately managed. He was seen in ED due to occlusion of catheter due to sediment. Recently completed treatment for UTI.    5/21/2025: Clinic visit with Steve Hodges MD. Patient reports doing well. He continues to have heavy drainage from wounds,  though no maceration. Right ischial wound larger. Left ischial wound smaller.    REVIEW OF SYSTEMS:   Unchanged from previous wound clinic assessment on 5/14/2025, except as noted in interval history above.      PHYSICAL EXAMINATION:   There were no vitals taken for this visit.  Physical Exam  Constitutional:       Appearance: He is obese.   Cardiovascular:      Rate and Rhythm: Normal rate.   Pulmonary:      Effort: Pulmonary effort is normal.   Abdominal:      Comments: Colostomy left lower quadrant   Genitourinary:     Comments: Suprapubic catheter to down drain   Skin:     Comments: Stage IV pressure injury to right ischium: Wound measures larger, continued heavy drainage. Thin layer of slough to wound bed and is hypergranular.  No odor. No periwound erythema or induration    Stage IV pressure injury of left ischium: Wound slightly smaller. Thin layer of slough to wound bed. Heavy serous drainage with no odor today. No evidence of soft tissue infection    Stage IV pressure injury sacrum: Remains resolved    All other wounds remain healed, high risk for recurrence     Neurological:      Mental Status: He is alert and oriented to person, place, and time.   Psychiatric:         Mood and Affect: Mood normal.         WOUND ASSESSMENT  Wound 12/12/23 Pressure Injury Ischium Right (Active)   Wound Image    05/21/25 1530   Site Assessment Red;Yellow 05/21/25 1530   Periwound Assessment Maceration 05/21/25 1530   Margins Attached edges;Unattached edges 05/21/25 1530   Closure Secondary intention 04/02/25 1559   Drainage Amount Large 05/21/25 1530   Drainage Description Serosanguineous 05/21/25 1530   Treatments Cleansed;Provider debridement;Site care 05/21/25 1530   Offloading/DME Offloading cushion;Sacral offloading dressing 05/21/25 1530   ** Retired** Wound Cleansing Hypochlorus Acid 05/07/25 1400   Wound Cleansing Hypochlorus Acid 05/21/25 1530   Periwound Protectant Skin Protectant Wipes to Periwound;TRIAD paste  05/21/25 1530   Dressing Status Old drainage 05/21/25 1530   Dressing Changed Changed 05/21/25 1530   Dressing Cleansing/Solutions Not Applicable 05/21/25 1530   Dressing Options Hydrofiber Silver;Super Absorbent Pad;Sacral Dressing 05/21/25 1530   Dressing Change/Treatment Frequency Monday, Wednesday, Friday, and As Needed 05/21/25 1530   WOUND NURSE ONLY - Pressure Injury Stage 4 05/21/25 1530   Non-staged Wound Description Not applicable 04/02/25 1559   Wound Length (cm) 6.8 cm 05/21/25 1530   Wound Width (cm) 2.5 cm 05/21/25 1530   Wound Depth (cm) 0.3 cm 05/21/25 1530   Wound Surface Area (cm^2) 13.35 cm^2 05/21/25 1530   Wound Volume (cm^3) 2.67 cm^3 05/21/25 1530   Post-Procedure Length (cm) 6.8 cm 05/21/25 1530   Post-Procedure Width (cm) 2.5 cm 05/21/25 1530   Post-Procedure Depth (cm) 0.4 cm 05/21/25 1530   Post-Procedure Surface Area (cm^2) 13.35 cm^2 05/21/25 1530   Post-Procedure Volume (cm^3) 3.56 cm^3 05/21/25 1530   Wound Healing % 95 05/21/25 1530   Tunneling (cm) 0 cm 05/21/25 1530   Tunneling Clock Position of Wound 6 05/14/25 1705   Undermining (cm) 0 cm 05/21/25 1530   Undermining of Wound, 1st Location From 12 o'clock;To 7 o'clock 05/07/25 1400   Undermining (cm) - 2nd location 0.5 cm 02/19/25 1500   Undermining of Wound, 2nd Location From 7 o'clock;From 12 o'clock;To 2 o'clock 02/19/25 1500   Wound Odor None 05/21/25 1530   Exposed Structures None 05/21/25 1530   Number of days: 526       Wound 05/01/24 Pressure Injury Ischium Left (Active)   Wound Image    05/21/25 1530   Site Assessment Pink;Red;Yellow;Undermining 05/21/25 1530   Periwound Assessment Maceration;Scar tissue 05/21/25 1530   Margins Unattached edges 05/21/25 1530   Closure Secondary intention 04/02/25 1559   Drainage Amount Large 05/21/25 1530   Drainage Description Serosanguineous 05/21/25 1530   Treatments Cleansed;Provider debridement;Site care 05/21/25 1530   Offloading/DME Sacral offloading dressing;Offloading cushion  05/21/25 1530   ** Retired** Wound Cleansing Hypochlorus Acid 05/07/25 1400   Wound Cleansing Foam Cleanser/Washcloth;Hypochlorus Acid 05/21/25 1530   Periwound Protectant Skin Protectant Wipes to Periwound;TRIAD paste 05/21/25 1530   Dressing Changed Changed 05/07/25 1400   Dressing Cleansing/Solutions Hypochlorous acid 05/21/25 1530   Dressing Options Hydrofiber Silver;Super Absorbent Pad;Sacral Dressing 05/21/25 1530   Dressing Change/Treatment Frequency Monday, Wednesday, Friday, and As Needed 05/21/25 1530   Wound Team Following Weekly 05/07/25 1400   WOUND NURSE ONLY - Pressure Injury Stage 4 05/21/25 1530   Non-staged Wound Description Not applicable 05/07/25 1400   Wound Length (cm) 5.5 cm 05/21/25 1530   Wound Width (cm) 2 cm 05/21/25 1530   Wound Depth (cm) 1.5 cm 05/21/25 1530   Wound Surface Area (cm^2) 8.64 cm^2 05/21/25 1530   Wound Volume (cm^3) 8.639 cm^3 05/21/25 1530   Post-Procedure Length (cm) 5.7 cm 05/21/25 1530   Post-Procedure Width (cm) 2 cm 05/21/25 1530   Post-Procedure Depth (cm) 1.5 cm 05/21/25 1530   Post-Procedure Surface Area (cm^2) 8.95 cm^2 05/21/25 1530   Post-Procedure Volume (cm^3) 8.954 cm^3 05/21/25 1530   Wound Healing % -3827 05/21/25 1530   Tunneling (cm) 0 cm 05/21/25 1530   Undermining (cm) 1.7 cm 05/21/25 1530   Undermining of Wound, 1st Location From 10 o'clock;To 6 o'clock 05/21/25 1530   Undermining (cm) - 2nd location 0 cm 05/21/25 1530   Undermining of Wound, 2nd Location From 5 o'clock;To 7 o'clock 05/07/25 1400   Wound Odor None 05/21/25 1530   Exposed Structures Fascia 05/21/25 1530   Number of days: 385       PROCEDURE: Excisional debridement of right and left ischial wounds  -2% viscous lidocaine applied topically to wound bed for approximately 5 minutes prior to debridement  -Curette used to debride both wound beds.  Excisional debridement was performed to remove devitalized tissue until healthy, bleeding tissue was visualized.  Total area debrided was 22.3  "cm², including into undermined areas of wounds.  Tissue debrided into the muscle / fascia layer.    -Bleeding controlled with manual pressure.  - Hypergranulation tissue treated with silver nitrate  -Wound care completed by wound RN, refer to flowsheet  -Patient tolerated the procedure well, without c/o pain or discomfort.    Pertinent Labs and Diagnostics:    Labs:     A1c: No results found for: \"HBA1C\"     [facility] results, 7/1/2022 (under media tab)    CRP 13    ESR 31      IMAGING:     X-ray left tib-fib ordered 2/16/2024 through [facility]    12/11/2023-CT of abdomen pelvis with contrast  IMPRESSION:   1.  Right ischial decubitus ulcer extending to bone with soft tissue gas tracking along the right perineum. Appearance suggesting osteomyelitis, consider component of necrotizing fasciitis as clinically appropriate.  2.  Small pericardial effusion  3.  Left adrenal nodule, density on prior noncontrast CT demonstrates adenoma.  4.  Hepatomegaly  5.  Enlarged prostate, workup and evaluation for causes of prostate enlargement recommended as clinically appropriate.  6.  Atherosclerosis and atherosclerotic coronary artery disease    12/15/2023-bone scan of left foot  IMPRESSION:     1.  Mild increased activity in the LEFT 1st and 3rd toes on blood pool and delayed images possibly indicating inflammation/infection.  2.  No significant blood flow asymmetry.          VASCULAR STUDIES: No results found.    LAST  WOUND CULTURE:   Lab Results   Component Value Date/Time    CULTRSULT Usual skin ade 10-50,000 cfu/mL (A) 04/12/2025 09:45 PM    CULTRSULT Proteus mirabilis  ,000 cfu/mL   (A) 04/12/2025 09:45 PM      PATHOLOGY  2/17/2023-bone fragment extracted from left lower extremity wound\\  FINAL DIAGNOSIS:     A. Left leg bone fragment at base of chronic wound:          Extensively degenerated fibrocartilaginous tissue with a rim of           fibrinopurulent debris          Correlate with culture findings "            Comment: While no residual intact bone is identified, these           findings are suggestive of adjacent osteomyelitis.      ASSESSMENT AND PLAN:     1. Sacral decubitus ulcer, stage IV ([facility])  Comments: Ulcer first noted in early April 2022 as small open area which quickly enlarged.  This ulcer is present distal from previous sacral ulcer which healed after surgery in January.  Patient has history of flap reconstruction x2 to this area.    5/21/2025: Remains resolved.  - Continue to protect area with pressure relieving sacral foam dressing.  -Patient does spend a lot of time up in his wheelchair, and wishes to continue to do so for his quality of life.  He lives independently, drives, and is involved with family activities.    -His has a new cushion in his wheelchair.  Has yet to pick out a new wheelchair  - Known OM that was previously treated. CT scan done during recent hospitalization did not show OM of sacrum, however OM of the ischium noted.  -Patient is very well versed in pressure relief strategies  -Monitor site each clinic visit    Wound care: silicone adhesive foam dressing    2. Pressure injury of right ischium, stage 4 ([facility])  Comments: Abscess and OM found on CT during hospitalization in December 2023.  Patient underwent I&D with VAC placement.  IV antibiotics through 1/22/2024.    5/21/2025: Wound measuring larger.  - Excisional debridement of nonviable tissue from wound in clinic today, medically necessary to promote wound healing  - VAC discontinued previously.  -Patient to return to clinic weekly for assessment, debridement, and dressing change.    -Home health to change dressing 2 times per week in between clinic visits.    -Patient is very well versed on pressure relief measures, has adequate surface on bed, alternating low air loss.      Wound care: Hydrofiber silver, superabsorbent foam, Silicone foam    3. Pressure injury of left ischium, stage 4 ([facility])    5/14/2025: Wound measuring  slightly smaller  - Excisional debridement of wound in clinic today, medically necessary to promote wound healing.  - Patient to return to clinic weekly for assessment, debridement, and dressing change  -VAC has been discontinued  -Home health to change dressing 2 times per week in between clinic visits.    -Patient has new cushion in his wheelchair, see above  -Patient is very well versed on pressure relief measures, has adequate surface on bed, alternating low air loss.  - Home health had difficulty obtaining enluxtra. Due to increased drainage, will hold hydrofera blue classic and apply multiple layer of hydrofiber to base of wound    Wound care: Hydrofiber silver, superabsorbent foam, Silicone foam    4. Other acute osteomyelitis, other site ([facility])    5/14/2025: Bone fragments removed during clinic visit in June were sent for pathology, polymicrobial, most concerning was an MDR ESBL Proteus.   -Patient completed IV antibiotics.  No further antibiotics at this time per ID  -Patient understands he has a very low threshold for infection/sepsis.  He understands he is to go to the emergency room immediately if he begins to experience fevers, chills, nausea or vomiting, or if he notices radiating erythema or purulent drainage from his wounds.    5. Quadriplegia, C5-C7 incomplete ([facility])  Comments: Complicating factor.  Impaired mobility and sensation  -Patient is still spending 7-8 hours/day up in his wheelchair and knows to reposition frequently.  Wears heel float boots bilaterally at all times  - Patient has new custom wheelchair seat. He had refitting and appears he was not sitting back in chair enough which was causing undue pressure to IT. He is more aware of the correct positioning in chair.    Please note that this note may have been created using voice recognition software. I have worked with technical experts from [employer] to optimize the interface.  I have made every reasonable attempt to correct obvious  errors, but there may be errors of grammar and possibly content that I did not discover before finalizing the note.

## 2025-05-22 NOTE — PROGRESS NOTES
Advised patient that there is excoriation and denudation to posterior scrotum. Added triad paste to scrotum for protection against drainage. Believe that the drainage is coming from the right ischial wound bed. Patient verbalizes understanding.     Updated wound care orders faxed to [facility] via [facility].

## 2025-05-22 NOTE — PATIENT INSTRUCTIONS
"The following information is a summary of the education provided in the clinic today. This is not an exhaustive list of the education provided during your appointment.       DRESSING CHANGES    Keep your wound dressing clean, dry, and intact. Change your dressing every 2 days AND/OR if the dressing becomes, soiled, leaks, gets wet, or falls off.      You may not shower with the dressing(s) off. Please do not take baths, or swim in the ocean, lakes, rivers, pools, or hot tubs.      Wounds do not need to \"air out\" or \"breathe\". Gently dry your wound before placing a new dressing.     After you get out of the shower, wash the wound a second time (with soap and water, wound cleanser, or saline). Gently dry the wound before you place a new dressing.       If you need to change your dressings at home, you should wash your wound. Use normal saline, wound cleanser, hypochlorous acid, or unscented soap and water. Do not use hydrogen peroxide or rubbing alcohol to clean your wounds. Hydrogen peroxide and rubbing alcohol will damage new cells and tissue. Do not use betadine or iodine unless told to by your wound care team.    Do not soak your wounds in epsom salt baths. This can worsen your wound(s) or delay wound healing. It can also lead to infection or maceration (tissue is too wet).     If you do not have home health, the clinic will give you with leftover supplies from your appointment. We do not give out extra dressing supplies. We will order you supplies through a [employer] company. Your insurance may or may not pay for all these supplies. The company will reach out to you if insurance does not cover supplies. These supplies will be sent to your home within a few days. If you do have home health, they will provide wound care supplies.     The dressings we use may change as your wound changes.     GENERAL HEALTH ADVICE   -Nutrition is important for wound healing. Unless told otherwise by your doctor, eat more protein and " consider supplementing with a multi-vitamin, zinc, and vitamin C.         OFFLOADING  -Offloading is important in helping treat pressure injuries. If you have a pressure injury, it's important to keep weight off of it to help wound healing. Offloading cushions help redistribute pressure. You should also change positions frequently, especially when sitting in a chair. You should reposition at least every 20-30 minutes. While some dressings help reduce pressure, but offloading cushions and regular repositioning are also necessary.   -Offloading cushions help relieve pressure from some wounds. You were given a handout today that explains the different types of cushions and where to buy them.       CLINIC INFORMATION  The clinic's hours are Monday-Friday, 7:30 AM to 5:00 PM. We are closed most holidays and on weekends. If you leave us a message, please allow 24 hours for someone to return your call. If you have concerns or are having a medical emergency, call 911 or go to the hospital emergency room.     You might not see the same nurse or provider every visit.     If you notice any large changes in your wound(s), or signs of infection (redness, swelling, localized heat, increased pain, fever > 101 F, chills, nausea/vomiting) or have any questions about your home care instructions, please call the wound center at [phone]. If it's after hours, contact your primary care physician or go to the hospital emergency room. If you are admitted to any hospital, you will need a new referral to come back to the wound clinic. Any wound care appointments that you already have may be cancelled.    If you are 5 or more minutes late for an appointment, we reserve the right to cancel and reschedule that appointment. For example, if your appointment is at 1:00 PM, and you arrive at 1:06 PM, you are more than five minutes late and might not be seen. If you are consistently late or not coming to your appointments (typically 3 late  cancellations and/or no shows), we reserve the right to cancel your future appointments or discharge you from the clinic. It is then your responsibility to obtain a new referral if wound care is still needed.

## 2025-05-28 ENCOUNTER — OFFICE VISIT (OUTPATIENT)
Dept: WOUND CARE | Facility: MEDICAL CENTER | Age: 75
End: 2025-05-28
Payer: MEDICARE

## 2025-05-28 DIAGNOSIS — G82.54 QUADRIPLEGIA, C5-C7, INCOMPLETE (HCC): Primary | ICD-10-CM

## 2025-05-28 DIAGNOSIS — L89.314 PRESSURE INJURY OF RIGHT ISCHIUM, STAGE 4 (HCC): ICD-10-CM

## 2025-05-28 DIAGNOSIS — L89.154 SACRAL DECUBITUS ULCER, STAGE IV (HCC): ICD-10-CM

## 2025-05-28 DIAGNOSIS — L89.324 PRESSURE INJURY OF LEFT ISCHIUM, STAGE 4 (HCC): ICD-10-CM

## 2025-05-28 DIAGNOSIS — M86.18 OTHER ACUTE OSTEOMYELITIS, OTHER SITE (HCC): ICD-10-CM

## 2025-05-28 PROCEDURE — 99213 OFFICE O/P EST LOW 20 MIN: CPT

## 2025-05-28 PROCEDURE — 11043 DBRDMT MUSC&/FSCA 1ST 20/<: CPT | Performed by: STUDENT IN AN ORGANIZED HEALTH CARE EDUCATION/TRAINING PROGRAM

## 2025-05-28 PROCEDURE — 11043 DBRDMT MUSC&/FSCA 1ST 20/<: CPT

## 2025-05-28 PROCEDURE — 11042 DBRDMT SUBQ TIS 1ST 20SQCM/<: CPT

## 2025-05-28 PROCEDURE — 11046 DBRDMT MUSC&/FSCA EA ADDL: CPT | Performed by: STUDENT IN AN ORGANIZED HEALTH CARE EDUCATION/TRAINING PROGRAM

## 2025-05-28 NOTE — PATIENT INSTRUCTIONS
-Keep your wound dressing clean, dry, and intact. Only change dressing if it's over saturated, soiled or falls off.     -Change your dressing if it becomes soiled, soaked, or falls off.  -Remove your compression wrap if you have severe pain, severe swelling, numbness, color change, or temperature change in your toes. If you need to remove your compression wrap, do so by unrolling it. Do not cut the compression wrap off to prevent cutting yourself on accident.  -Should you experience any significant changes in your wound(s), such as infection (redness, swelling, localized heat, increased pain, fever > 101 F, chills) or have any questions regarding your home care instructions, please contact the wound center at [phone]. If after hours, contact your primary care physician or go to the hospital emergency room.     If you are admitted to any hospital, you will need a new referral to come back to the wound clinic and any scheduled appointments that you already have, may be cancelled.

## 2025-05-28 NOTE — PROGRESS NOTES
Provider Encounter- Pressure Injury        HISTORY OF PRESENT ILLNESS  Wound History:    START OF CARE IN CLINIC: 1/31/2024 (return to clinic after hospitalization)    REFERRING PROVIDER: Racquel York       WOUNDS-superior coccyx pressure injury, stage IV-resolved                                 Coccyx pressure injury, stage IV-resolved           Inferior coccyx pressure injury, stage IV resolved                                 Right ischial pressure injury, stage IV                                 Left heel pressure injury, recurring stage III-resolved                                 Left posterior lower leg/calf-stage III-resolved                                 Left medial lower leg surgical wound dehiscence-resolved, reopens frequently-resolved            Left ischial pressure injury, stage IV-first observed in clinic 5/1/2024          HISTORY: Patient with history of incomplete quadriplegia referred to [facility] for treatment of a stage IV pressure injury.  He has a history of previous pressure injuries to this area, and underwent muscle flaps in 2019, and then again in 2020.  He was seen in the wound clinic in November 2021 for an ulcer proximal from his current ulcer, and pressure injuries to his left posterior lower leg and left heel.  At that time, it was discovered that the patient had retained VAC foam embedded in the wound bed of the sacral wound.  Attempts were made to get him back to his plastic surgeon, though unsuccessful.  In January he underwent surgical removal of VAC sponge along with excisional debridement of his sacral wound by Dr. Chaves.  After the surgery, his wound went on to heal without incident.   In early April 2022, his home health nurse noted a new sacral ulcer, below the previous ulcer which quickly tripled in size over the following weeks.  The ulcer to his left medial lower leg had also deteriorated, with bone visible at the base..  He was hospitalized from 4/22 until 4/27/2022 and  underwent surgery with Dr. Raman on 4/26 for irrigation and debridement of multiple compartments of the left lower extremity, bone excision, and complex closure of chronic wound using biologic skin substitute.   His sacrococcygeal wound was not surgically addressed during this admission.  He was discharged back to his group home, with home health, and referral to outpatient wound clinic for his sacral wound.  He was instructed to follow-up with his surgeon for his lower leg wound.       Postoperatively, the left medial lower leg incision dehisced.  He was seen by his surgeon at [facility] on 5/11.  The surgeon opted to leave remaining sutures in place, and refer him to the wound clinic for treatment of this wound.   Treatment of this wound was initiated in clinic on 5/12.  During this visit was also noted that his heel DTI had resolved, but that he had a new pressure injury to his left posterior lower extremity.     A new pressure injury was noted to patient's right upper buttock/lower back on 5/20/2022.  Wound was linear in shape, skin discolored but intact.     Abrasion noted to left anterior lower leg.  First observed in clinic on 7/22/2022.  Patient states he bumped his leg into his food tray.     Small DTI noted to patient's left lateral lower leg on 7/29/2022.  Skin intact but discolored.     Large area of deep tissue injury noted to patient's left exterior lower leg.  Patient denied any trauma to this area.  Skin intact.  Wound documented.    1/27/2023: Patient was admitted [place] [facility] [place]23[place]1/25/2023 with gross hematuria. He underwent RICHARD which showed watchman device was in place and he was taken off of Xarelto. While hospitalized wound team was consulted. He was referred back to [facility] and home health upon discharge.    Patient was hospitalized at [facility] for pyelonephritis from 2/26 until 3/2/2023, admitted for fever and general malaise.  He was admitted and initially started on linezolid and meropenem for suspected  UTI and history of multidrug-resistant organisms.  Urine cultures were negative. ID was consulted, recommended CT of chest and abdomen,which were negative for acute findings. However, he was treated with 5 days total course of antibiotics for suspected UTI, and symptoms completely resolved.  During this admission, the inpatient wound team was consulted for treatment of his sacral and lower leg wounds.  A wound culture was taken from his left heel pressure ulcer, negative.  Once stabilized, he was discharged home and referred back to [facility] to resume treatment of his wounds.    Patient was hospitalized at [facility] from 12/11 until 12/23/2023, admitted for fever.  Wound infection suspected.  CT scan of abdomen and pelvis for evaluation of sacral pressure injury showed gas tracking down to the bone consistent with osteomyelitis.  He underwent I&D of right ischial ulcer (documented as buttock) with Dr. Bansal, medial tract leading to an abscess was identified.  Cavity was opened allowing it to drain into the main wound bed.  Wound VAC was placed and managed by wound team during this admission.  A bone scan of patient's left foot was also done, initially concerning for osteomyelitis.  Orthopedic surgery was consulted and did not recommend surgical intervention. ID consulted also, recommended the patient to receive IV ertapenem 1 g every 24 hours plus IV daptomycin 8 mg/kg every 24 hours through 1/22/2024.   He was discharged to [facility] on 1/23 for IV antibiotics and wound care.  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  Never used smokeless tobacco    Patient's problem list, allergies, and current medications reviewed and updated in Epic    Interval History:  Interval History thinned 7/29/2022.  Please see previous notes for complete interval history.   Interval History thinned 1/27/2023. Please see previous note for complete interval history.  Interval History thinned 3/3/2023.  Please see previous notes for complete interval history.    Interval History thinned 8/4/2023.  Please see previous notes for complete interval history.    Interval History thinned 1/31/2024.  Please see previous notes for complete interval history.    Interval History thinned 6/26/2024.  Please see previous notes for complete interval history.  Interval History thinned 4/29/2025.  Please see previous notes for complete interval history.         4/23/2025: Clinic visit with Steve Hodges MD. Patient spent more time in chair this week with Easter and wounds slightly larger though measurements are fairly positional. Less maceration and saturation of cover dressing with enluxtra, will continue.    4/30/2025: Clinic visit with Steve Hodges MD. Patient reports doing ok. Denies any acute issues. Wounds stable, continues to have heavy drainage. Will hold enluxtra today and change to superabsorbent pad to see if will manage drainage better.    5/7/2025: Clinic visit with Steve Hodges MD. Patient feels that drainage is adequately managed with current dressing. He denies any acute issues. Wounds stable.    5/14/2025: Clinic visit with Steve Hodges MD. Patient reports doing ok, denies any fevers or chills. Wounds stable. He feels that drainage has been adequately managed. He was seen in ED due to occlusion of catheter due to sediment. Recently completed treatment for UTI.    5/21/2025: Clinic visit with Steve Hodges MD. Patient reports doing well. He continues to have heavy drainage from wounds,  though no maceration. Right ischial wound larger. Left ischial wound smaller.    5/28/2025: Clinic visit with Steve Hodges MD. Patient reports doing ok. Appears that we are managing drainage better. He denies any complaints.    REVIEW OF SYSTEMS:   Unchanged from previous wound clinic assessment on 5/21/2025, except as noted in interval history above.      PHYSICAL EXAMINATION:   There were no vitals taken for this visit.  Physical Exam  Constitutional:       Appearance: He is obese.   Cardiovascular:      Rate and Rhythm: Normal rate.   Pulmonary:      Effort: Pulmonary effort is normal.   Abdominal:      Comments: Colostomy left lower quadrant   Genitourinary:     Comments: Suprapubic catheter to down drain   Skin:     Comments: Stage IV pressure injury to right ischium: Wound measures slightly larger, continued heavy drainage. Thin layer of slough to wound bed and is hypergranular.  No odor. No periwound erythema or induration    Stage IV pressure injury of left ischium: Wound stable. Thin layer of slough to wound bed. Heavy serous drainage with no odor today. No evidence of soft tissue infection    Stage IV pressure injury sacrum: Remains resolved    All other wounds remain healed, high risk for recurrence     Neurological:      Mental Status: He is alert and oriented to person, place, and time.   Psychiatric:         Mood and Affect: Mood normal.         WOUND ASSESSMENT  Wound 12/12/23 Pressure Injury Ischium Right (Active)   Wound Image    05/28/25 1600   Site Assessment Red;Yellow 05/28/25 1600   Periwound Assessment Maceration;Blanchable erythema 05/28/25 1600   Margins Attached edges 05/28/25 1600   Closure Secondary intention 04/02/25 1559   Drainage Amount Large 05/28/25 1600   Drainage Description Serosanguineous 05/28/25 1600   Treatments Cleansed;Provider debridement;Site care 05/28/25 1600   Offloading/DME Offloading cushion 05/28/25 1600   ** Retired** Wound Cleansing Hypochlorus Acid 05/07/25  1400   Wound Cleansing Foam Cleanser/Washcloth;Hypochlorus Acid 05/28/25 1600   Periwound Protectant No-sting Skin Prep;TRIAD paste 05/28/25 1600   Dressing Status Old drainage 05/21/25 1530   Dressing Changed Changed 05/28/25 1600   Dressing Cleansing/Solutions Not Applicable 05/28/25 1600   Dressing Options Hydrofiber Silver;Hydrofiber;Super Absorbent Pad;Sacral Dressing - Offloading 05/28/25 1600   Dressing Change/Treatment Frequency Monday, Wednesday, Friday, and As Needed 05/28/25 1600   WOUND NURSE ONLY - Pressure Injury Stage 4 05/28/25 1600   Non-staged Wound Description Full thickness 05/28/25 1600   Wound Length (cm) 7 cm 05/28/25 1600   Wound Width (cm) 2.8 cm 05/28/25 1600   Wound Depth (cm) 0.4 cm 05/28/25 1600   Wound Surface Area (cm^2) 15.39 cm^2 05/28/25 1600   Wound Volume (cm^3) 4.105 cm^3 05/28/25 1600   Post-Procedure Length (cm) 7 cm 05/28/25 1600   Post-Procedure Width (cm) 2.8 cm 05/28/25 1600   Post-Procedure Depth (cm) 0.4 cm 05/28/25 1600   Post-Procedure Surface Area (cm^2) 15.39 cm^2 05/28/25 1600   Post-Procedure Volume (cm^3) 4.105 cm^3 05/28/25 1600   Wound Healing % 93 05/28/25 1600   Tunneling (cm) 0 cm 05/28/25 1600   Tunneling Clock Position of Wound 6 05/14/25 1705   Undermining (cm) 0 cm 05/28/25 1600   Undermining of Wound, 1st Location From 12 o'clock;To 7 o'clock 05/07/25 1400   Undermining (cm) - 2nd location 0.5 cm 02/19/25 1500   Undermining of Wound, 2nd Location From 7 o'clock;From 12 o'clock;To 2 o'clock 02/19/25 1500   Wound Odor None 05/28/25 1600   Exposed Structures None 05/28/25 1600   Number of days: 533       Wound 05/01/24 Pressure Injury Ischium Left (Active)   Wound Image    05/28/25 1600   Site Assessment Pink;Red;Yellow 05/28/25 1600   Periwound Assessment Maceration;Scar tissue 05/28/25 1600   Margins Unattached edges 05/28/25 1600   Closure Secondary intention 04/02/25 1559   Drainage Amount Large 05/28/25 1600   Drainage Description Serosanguineous  05/28/25 1600   Treatments Cleansed;Provider debridement;Site care 05/28/25 1600   Offloading/DME Sacral offloading dressing 05/28/25 1600   ** Retired** Wound Cleansing Hypochlorus Acid 05/07/25 1400   Wound Cleansing Foam Cleanser/Washcloth;Hypochlorus Acid 05/28/25 1600   Periwound Protectant Skin Protectant Wipes to Periwound;TRIAD paste 05/28/25 1600   Dressing Changed Changed 05/07/25 1400   Dressing Cleansing/Solutions Not Applicable 05/28/25 1600   Dressing Options Hydrofiber Silver;Super Absorbent Pad;Sacral Dressing - Offloading 05/28/25 1600   Dressing Change/Treatment Frequency Monday, Wednesday, Friday, and As Needed 05/28/25 1600   Wound Team Following Weekly 05/07/25 1400   WOUND NURSE ONLY - Pressure Injury Stage 4 05/28/25 1600   Non-staged Wound Description Not applicable 05/07/25 1400   Wound Length (cm) 5.2 cm 05/28/25 1600   Wound Width (cm) 2 cm 05/28/25 1600   Wound Depth (cm) 1.4 cm 05/28/25 1600   Wound Surface Area (cm^2) 8.17 cm^2 05/28/25 1600   Wound Volume (cm^3) 7.624 cm^3 05/28/25 1600   Post-Procedure Length (cm) 5.2 cm 05/28/25 1600   Post-Procedure Width (cm) 2 cm 05/28/25 1600   Post-Procedure Depth (cm) 1.4 cm 05/28/25 1600   Post-Procedure Surface Area (cm^2) 8.17 cm^2 05/28/25 1600   Post-Procedure Volume (cm^3) 7.624 cm^3 05/28/25 1600   Wound Healing % -3365 05/28/25 1600   Tunneling (cm) 0 cm 05/28/25 1600   Undermining (cm) 0 cm 05/28/25 1600   Undermining of Wound, 1st Location From 10 o'clock;To 6 o'clock 05/21/25 1530   Undermining (cm) - 2nd location 0 cm 05/28/25 1600   Undermining of Wound, 2nd Location From 5 o'clock;To 7 o'clock 05/07/25 1400   Wound Odor None 05/28/25 1600   Exposed Structures Fascia 05/28/25 1600   Number of days: 392       PROCEDURE: Excisional debridement of right and left ischial wounds  -2% viscous lidocaine applied topically to wound bed for approximately 5 minutes prior to debridement  -Curette used to debride both wound beds.  Excisional  "debridement was performed to remove devitalized tissue until healthy, bleeding tissue was visualized.  Total area debrided was 23.56 cm², including into undermined areas of wounds.  Tissue debrided into the muscle / fascia layer.    -Bleeding controlled with manual pressure.  - Hypergranulation tissue treated with silver nitrate  -Wound care completed by wound RN, refer to flowsheet  -Patient tolerated the procedure well, without c/o pain or discomfort.    Pertinent Labs and Diagnostics:    Labs:     A1c: No results found for: \"HBA1C\"     [facility] results, 7/1/2022 (under media tab)    CRP 13    ESR 31      IMAGING:     X-ray left tib-fib ordered 2/16/2024 through [facility]    12/11/2023-CT of abdomen pelvis with contrast  IMPRESSION:   1.  Right ischial decubitus ulcer extending to bone with soft tissue gas tracking along the right perineum. Appearance suggesting osteomyelitis, consider component of necrotizing fasciitis as clinically appropriate.  2.  Small pericardial effusion  3.  Left adrenal nodule, density on prior noncontrast CT demonstrates adenoma.  4.  Hepatomegaly  5.  Enlarged prostate, workup and evaluation for causes of prostate enlargement recommended as clinically appropriate.  6.  Atherosclerosis and atherosclerotic coronary artery disease    12/15/2023-bone scan of left foot  IMPRESSION:     1.  Mild increased activity in the LEFT 1st and 3rd toes on blood pool and delayed images possibly indicating inflammation/infection.  2.  No significant blood flow asymmetry.          VASCULAR STUDIES: No results found.    LAST  WOUND CULTURE:   Lab Results   Component Value Date/Time    CULTRSULT Usual skin ade 10-50,000 cfu/mL (A) 04/12/2025 09:45 PM    CULTRSULT Proteus mirabilis  ,000 cfu/mL   (A) 04/12/2025 09:45 PM      PATHOLOGY  2/17/2023-bone fragment extracted from left lower extremity wound\\  FINAL DIAGNOSIS:     A. Left leg bone fragment at base of chronic wound:          " Extensively degenerated fibrocartilaginous tissue with a rim of           fibrinopurulent debris          Correlate with culture findings            Comment: While no residual intact bone is identified, these           findings are suggestive of adjacent osteomyelitis.      ASSESSMENT AND PLAN:     1. Sacral decubitus ulcer, stage IV ([facility])  Comments: Ulcer first noted in early April 2022 as small open area which quickly enlarged.  This ulcer is present distal from previous sacral ulcer which healed after surgery in January.  Patient has history of flap reconstruction x2 to this area.    5/28/2025: Remains resolved.  - Continue to protect area with pressure relieving sacral foam dressing.  -Patient does spend a lot of time up in his wheelchair, and wishes to continue to do so for his quality of life.  He lives independently, drives, and is involved with family activities.    -His has a new cushion in his wheelchair.  Has yet to pick out a new wheelchair  - Known OM that was previously treated. CT scan done during recent hospitalization did not show OM of sacrum, however OM of the ischium noted.  -Patient is very well versed in pressure relief strategies  -Monitor site each clinic visit    Wound care: silicone adhesive foam dressing    2. Pressure injury of right ischium, stage 4 ([facility])  Comments: Abscess and OM found on CT during hospitalization in December 2023.  Patient underwent I&D with VAC placement.  IV antibiotics through 1/22/2024.    5/28/2025: Wound measuring larger, though drainage better controlled and has better granulation tissue.  - Excisional debridement of nonviable tissue from wound in clinic today, medically necessary to promote wound healing  - VAC discontinued previously.  -Patient to return to clinic weekly for assessment, debridement, and dressing change.    -Home health to change dressing 2 times per week in between clinic visits.    -Patient is very well versed on pressure relief measures, has  adequate surface on bed, alternating low air loss.      Wound care: Hydrofiber silver, superabsorbent foam, Silicone foam    3. Pressure injury of left ischium, stage 4 ([facility])    5/28/2025: Wound measuring slightly smaller  - Excisional debridement of wound in clinic today, medically necessary to promote wound healing.  - Patient to return to clinic weekly for assessment, debridement, and dressing change  -VAC has been discontinued  -Home health to change dressing 2 times per week in between clinic visits.    -Patient has new cushion in his wheelchair, see above  -Patient is very well versed on pressure relief measures, has adequate surface on bed, alternating low air loss.  - Home health had difficulty obtaining enluxtra. Due to increased drainage, will hold hydrofera blue classic and apply multiple layer of hydrofiber to base of wound    Wound care: Hydrofiber silver, superabsorbent foam, Silicone foam    4. Other acute osteomyelitis, other site ([facility])    5/28/2025: Bone fragments removed during clinic visit in June were sent for pathology, polymicrobial, most concerning was an MDR ESBL Proteus.   -Patient completed IV antibiotics.  No further antibiotics at this time per ID  -Patient understands he has a very low threshold for infection/sepsis.  He understands he is to go to the emergency room immediately if he begins to experience fevers, chills, nausea or vomiting, or if he notices radiating erythema or purulent drainage from his wounds.    5. Quadriplegia, C5-C7 incomplete ([facility])  Comments: Complicating factor.  Impaired mobility and sensation  -Patient is still spending 7-8 hours/day up in his wheelchair and knows to reposition frequently.  Wears heel float boots bilaterally at all times  - Patient has new custom wheelchair seat. He had refitting and appears he was not sitting back in chair enough which was causing undue pressure to IT. He is more aware of the correct positioning in chair.    Please note that  this note may have been created using voice recognition software. I have worked with technical experts from [facility] to optimize the interface.  I have made every reasonable attempt to correct obvious errors, but there may be errors of grammar and possibly content that I did not discover before finalizing the note.

## 2025-06-04 ENCOUNTER — OFFICE VISIT (OUTPATIENT)
Dept: WOUND CARE | Facility: MEDICAL CENTER | Age: 75
End: 2025-06-04
Payer: MEDICARE

## 2025-06-04 DIAGNOSIS — L89.314 PRESSURE INJURY OF RIGHT ISCHIUM, STAGE 4 (HCC): Primary | ICD-10-CM

## 2025-06-04 DIAGNOSIS — G82.54 QUADRIPLEGIA, C5-C7, INCOMPLETE (HCC): ICD-10-CM

## 2025-06-04 DIAGNOSIS — L89.324 PRESSURE INJURY OF LEFT ISCHIUM, STAGE 4 (HCC): ICD-10-CM

## 2025-06-04 DIAGNOSIS — L89.154 SACRAL DECUBITUS ULCER, STAGE IV (HCC): ICD-10-CM

## 2025-06-04 DIAGNOSIS — M86.18 OTHER ACUTE OSTEOMYELITIS, OTHER SITE (HCC): ICD-10-CM

## 2025-06-04 PROCEDURE — 11043 DBRDMT MUSC&/FSCA 1ST 20/<: CPT | Performed by: STUDENT IN AN ORGANIZED HEALTH CARE EDUCATION/TRAINING PROGRAM

## 2025-06-04 PROCEDURE — 99213 OFFICE O/P EST LOW 20 MIN: CPT

## 2025-06-04 PROCEDURE — 11043 DBRDMT MUSC&/FSCA 1ST 20/<: CPT

## 2025-06-04 PROCEDURE — 11042 DBRDMT SUBQ TIS 1ST 20SQCM/<: CPT

## 2025-06-05 ENCOUNTER — TELEPHONE (OUTPATIENT)
Dept: WOUND CARE | Facility: MEDICAL CENTER | Age: 75
End: 2025-06-05
Payer: MEDICARE

## 2025-06-05 NOTE — TELEPHONE ENCOUNTER
Discussed in Complex Patient Rounds as Nerissa' wounds are not making progress and he continues to have large amount of drainage. Anjum has home health twice a week currently with Clinic visits once a week. Would like to get closer to daily dressing changes if possible.  Called [facility] to inquire ability for increased visits for 2 weeks. Discussed possibility of hyperbarics, unknown if any facilities could accommodate transfer with brigid.

## 2025-06-05 NOTE — PATIENT INSTRUCTIONS
"The following information is a summary of the education provided in the clinic today. This is not an exhaustive list of the education provided during your appointment.       DRESSING CHANGES    Keep your wound dressing clean, dry, and intact. Change your dressing every 2-3 days AND/OR if the dressing becomes, soiled, leaks, gets wet, or falls off.      You may not shower with the dressing(s) off. Please do not take baths, or swim in the ocean, lakes, rivers, pools, or hot tubs.      Wounds do not need to \"air out\" or \"breathe\". Gently dry your wound before placing a new dressing.      If you need to change your dressings at home, you should wash your wound. Use normal saline, wound cleanser, hypochlorous acid, or unscented soap and water. Do not use hydrogen peroxide or rubbing alcohol to clean your wounds. Hydrogen peroxide and rubbing alcohol will damage new cells and tissue. Do not use betadine or iodine unless told to by your wound care team.    Do not soak your wounds in epsom salt baths. This can worsen your wound(s) or delay wound healing. It can also lead to infection or maceration (tissue is too wet).     The dressings we use may change as your wound changes.     CLINIC INFORMATION    The clinic's hours are Monday-Friday, 7:30 AM to 5:00 PM. We are closed most holidays and on weekends. If you leave us a message, please allow 24 hours for someone to return your call. If you have concerns or are having a medical emergency, call 911 or go to the hospital emergency room.     You might not see the same nurse or provider every visit.     If you notice any large changes in your wound(s), or signs of infection (redness, swelling, localized heat, increased pain, fever > 101 F, chills, nausea/vomiting) or have any questions about your home care instructions, please call the wound center at [phone]. If it's after hours, contact your primary care physician or go to the hospital emergency room. If you are " admitted to any hospital, you will need a new referral to come back to the wound clinic. Any wound care appointments that you already have may be cancelled.    If you are 5 or more minutes late for an appointment, we reserve the right to cancel and reschedule that appointment. For example, if your appointment is at 1:00 PM, and you arrive at 1:06 PM, you are more than five minutes late and might not be seen. If you are consistently late or not coming to your appointments (typically 3 late cancellations and/or no shows), we reserve the right to cancel your future appointments or discharge you from the clinic. It is then your responsibility to obtain a new referral if wound care is still needed.

## 2025-06-05 NOTE — PROGRESS NOTES
Home health wound care orders routed to [facility] via [facility].

## 2025-06-05 NOTE — PROGRESS NOTES
Provider Encounter- Pressure Injury        HISTORY OF PRESENT ILLNESS  Wound History:    START OF CARE IN CLINIC: 1/31/2024 (return to clinic after hospitalization)    REFERRING PROVIDER: Racquel York       WOUNDS-superior coccyx pressure injury, stage IV-resolved                                 Coccyx pressure injury, stage IV-resolved           Inferior coccyx pressure injury, stage IV resolved                                 Right ischial pressure injury, stage IV                                 Left heel pressure injury, recurring stage III-resolved                                 Left posterior lower leg/calf-stage III-resolved                                 Left medial lower leg surgical wound dehiscence-resolved, reopens frequently-resolved            Left ischial pressure injury, stage IV-first observed in clinic 5/1/2024          HISTORY: Patient with history of incomplete quadriplegia referred to [facility] for treatment of a stage IV pressure injury.  He has a history of previous pressure injuries to this area, and underwent muscle flaps in 2019, and then again in 2020.  He was seen in the wound clinic in November 2021 for an ulcer proximal from his current ulcer, and pressure injuries to his left posterior lower leg and left heel.  At that time, it was discovered that the patient had retained VAC foam embedded in the wound bed of the sacral wound.  Attempts were made to get him back to his plastic surgeon, though unsuccessful.  In January he underwent surgical removal of VAC sponge along with excisional debridement of his sacral wound by Dr. Chaves.  After the surgery, his wound went on to heal without incident.   In early April 2022, his home health nurse noted a new sacral ulcer, below the previous ulcer which quickly tripled in size over the following weeks.  The ulcer to his left medial lower leg had also deteriorated, with bone visible at the base..  He was hospitalized from 4/22 until 4/27/2022 and  underwent surgery with Dr. Raman on 4/26 for irrigation and debridement of multiple compartments of the left lower extremity, bone excision, and complex closure of chronic wound using biologic skin substitute.   His sacrococcygeal wound was not surgically addressed during this admission.  He was discharged back to his group home, with home health, and referral to outpatient wound clinic for his sacral wound.  He was instructed to follow-up with his surgeon for his lower leg wound.       Postoperatively, the left medial lower leg incision dehisced.  He was seen by his surgeon at [facility] on 5/11.  The surgeon opted to leave remaining sutures in place, and refer him to the wound clinic for treatment of this wound.   Treatment of this wound was initiated in clinic on 5/12.  During this visit was also noted that his heel DTI had resolved, but that he had a new pressure injury to his left posterior lower extremity.     A new pressure injury was noted to patient's right upper buttock/lower back on 5/20/2022.  Wound was linear in shape, skin discolored but intact.     Abrasion noted to left anterior lower leg.  First observed in clinic on 7/22/2022.  Patient states he bumped his leg into his food tray.     Small DTI noted to patient's left lateral lower leg on 7/29/2022.  Skin intact but discolored.     Large area of deep tissue injury noted to patient's left exterior lower leg.  Patient denied any trauma to this area.  Skin intact.  Wound documented.    1/27/2023: Patient was admitted to [facility] from 1/23-1/25/2023 with gross hematuria. He underwent RICHARD which showed watchman device was in place and he was taken off of Xarelto. While hospitalized wound team was consulted. He was referred back to [facility] and home health upon discharge.    Patient was hospitalized at [facility] for pyelonephritis from 2/26 until 3/2/2023, admitted for fever and general malaise.  He was admitted and initially started on linezolid and meropenem for suspected  UTI and history of multidrug-resistant organisms.  Urine cultures were negative. ID was consulted, recommended CT of chest and abdomen,which were negative for acute findings. However, he was treated with 5 days total course of antibiotics for suspected UTI, and symptoms completely resolved.  During this admission, the inpatient wound team was consulted for treatment of his sacral and lower leg wounds.  A wound culture was taken from his left heel pressure ulcer, negative.  Once stabilized, he was discharged home and referred back to [facility] to resume treatment of his wounds.    Patient was hospitalized at [facility] from 12/11 until 12/23/2023, admitted for fever.  Wound infection suspected.  CT scan of abdomen and pelvis for evaluation of sacral pressure injury showed gas tracking down to the bone consistent with osteomyelitis.  He underwent I&D of right ischial ulcer (documented as buttock) with Dr. Bansal, medial tract leading to an abscess was identified.  Cavity was opened allowing it to drain into the main wound bed.  Wound VAC was placed and managed by wound team during this admission.  A bone scan of patient's left foot was also done, initially concerning for osteomyelitis.  Orthopedic surgery was consulted and did not recommend surgical intervention. ID consulted also, recommended the patient to receive IV ertapenem 1 g every 24 hours plus IV daptomycin 8 mg/kg every 24 hours through 1/22/2024.   He was discharged to [facility] on 1/23 for IV antibiotics and wound care.  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  Never used smokeless tobacco    Patient's problem list, allergies, and current medications reviewed and updated in Epic    Interval History:  Interval History thinned 7/29/2022.  Please see previous notes for complete interval history.   Interval History thinned 1/27/2023. Please see previous note for complete interval history.  Interval History thinned 3/3/2023.  Please see previous notes for complete interval history.    Interval History thinned 8/4/2023.  Please see previous notes for complete interval history.    Interval History thinned 1/31/2024.  Please see previous notes for complete interval history.    Interval History thinned 6/26/2024.  Please see previous notes for complete interval history.  Interval History thinned 4/29/2025.  Please see previous notes for complete interval history.         4/23/2025: Clinic visit with Steve Hodges MD. Patient spent more time in chair this week with Easter and wounds slightly larger though measurements are fairly positional. Less maceration and saturation of cover dressing with enluxtra, will continue.    4/30/2025: Clinic visit with Steve Hodges MD. Patient reports doing ok. Denies any acute issues. Wounds stable, continues to have heavy drainage. Will hold enluxtra today and change to superabsorbent pad to see if will manage drainage better.    5/7/2025: Clinic visit with Steve Hodges MD. Patient feels that drainage is adequately managed with current dressing. He denies any acute issues. Wounds stable.    5/14/2025: Clinic visit with Steve Hodges MD. Patient reports doing ok, denies any fevers or chills. Wounds stable. He feels that drainage has been adequately managed. He was seen in ED due to occlusion of catheter due to sediment. Recently completed treatment for UTI.    5/21/2025: Clinic visit with Steve Hodges MD. Patient reports doing well. He continues to have heavy drainage from wounds,  though no maceration. Right ischial wound larger. Left ischial wound smaller.    5/28/2025: Clinic visit with Steve Hodges MD. Patient reports doing ok. Appears that we are managing drainage better. He denies any complaints.    6/4/2025: Clinic visit with Steve Hodges MD. Patient reports doing well. Denies any acute issues. He reports that he continues to use tilt feature in wheelchair every 30 minutes. Wounds are stable.    REVIEW OF SYSTEMS:   Unchanged from previous wound clinic assessment on 5/28/2025, except as noted in interval history above.      PHYSICAL EXAMINATION:   There were no vitals taken for this visit.  Physical Exam  Constitutional:       Appearance: He is obese.   Cardiovascular:      Rate and Rhythm: Normal rate.   Pulmonary:      Effort: Pulmonary effort is normal.   Abdominal:      Comments: Colostomy left lower quadrant   Genitourinary:     Comments: Suprapubic catheter to down drain   Skin:     Comments: Stage IV pressure injury to right ischium: Wound stable. Continued heavy drainage. Thin layer of slough to wound bed and is slightly hypergranular.  No odor. No periwound erythema or induration    Stage IV pressure injury of left ischium: Wound stable. Thin layer of slough to wound bed. Heavy serous drainage with no odor today. No evidence of soft tissue infection    Stage IV pressure injury sacrum: Remains resolved    All other wounds remain healed, high risk for recurrence     Neurological:      Mental Status: He is alert and oriented to person, place, and time.   Psychiatric:         Mood and Affect: Mood normal.         WOUND ASSESSMENT  Wound 12/12/23 Pressure Injury Ischium Right (Active)   Wound Image    06/04/25 1703   Site Assessment Pink;Red;Yellow 06/04/25 1703   Periwound Assessment Maceration;Scar tissue 06/04/25 1703   Margins Attached edges 06/04/25 1703   Closure Secondary intention 04/02/25 1559   Drainage Amount Large 06/04/25 1703   Drainage Description  Serosanguineous 06/04/25 1703   Treatments Cleansed;Provider debridement;Site care 06/04/25 1703   Offloading/DME Offloading cushion 06/04/25 1703   ** Retired** Wound Cleansing Hypochlorus Acid 05/07/25 1400   Wound Cleansing Hypochlorus Acid;Foam Cleanser/Washcloth 06/04/25 1703   Periwound Protectant No-sting Skin Prep;Zinc paste 06/04/25 1703   Dressing Status Old drainage 05/21/25 1530   Dressing Changed Changed 05/28/25 1600   Dressing Cleansing/Solutions Not Applicable 06/04/25 1703   Dressing Options Hydrofiber Silver;Super Absorbent Pad;Sacral Dressing - Offloading;Hypafix Tape 06/04/25 1703   Dressing Change/Treatment Frequency Monday, Wednesday, Friday, and As Needed 06/04/25 1703   WOUND NURSE ONLY - Pressure Injury Stage 4 06/04/25 1703   Non-staged Wound Description Full thickness 05/28/25 1600   Wound Length (cm) 6.2 cm 06/04/25 1703   Wound Width (cm) 2.7 cm 06/04/25 1703   Wound Depth (cm) 0.3 cm 06/04/25 1703   Wound Surface Area (cm^2) 13.15 cm^2 06/04/25 1703   Wound Volume (cm^3) 2.63 cm^3 06/04/25 1703   Post-Procedure Length (cm) 6.2 cm 06/04/25 1703   Post-Procedure Width (cm) 2.8 cm 06/04/25 1703   Post-Procedure Depth (cm) 0.3 cm 06/04/25 1703   Post-Procedure Surface Area (cm^2) 13.63 cm^2 06/04/25 1703   Post-Procedure Volume (cm^3) 2.727 cm^3 06/04/25 1703   Wound Healing % 95 06/04/25 1703   Tunneling (cm) 0 cm 06/04/25 1703   Tunneling Clock Position of Wound 6 05/14/25 1705   Undermining (cm) 0 cm 06/04/25 1703   Undermining of Wound, 1st Location From 12 o'clock;To 7 o'clock 05/07/25 1400   Undermining (cm) - 2nd location 0.5 cm 02/19/25 1500   Undermining of Wound, 2nd Location From 7 o'clock;From 12 o'clock;To 2 o'clock 02/19/25 1500   Wound Odor None 06/04/25 1703   Exposed Structures None 06/04/25 1703   Number of days: 540       Wound 05/01/24 Pressure Injury Ischium Left (Active)   Wound Image    06/04/25 1703   Site Assessment Red;Granulation tissue;Hypergranulation;Yellow  06/04/25 1703   Periwound Assessment Maceration;Scar tissue 06/04/25 1703   Margins Attached edges;Unattached edges 06/04/25 1703   Closure Secondary intention 04/02/25 1559   Drainage Amount Large 06/04/25 1703   Drainage Description Serosanguineous 06/04/25 1703   Treatments Cleansed;Provider debridement;Site care;Silver nitrate 06/04/25 1703   Offloading/DME Sacral offloading dressing 06/04/25 1703   ** Retired** Wound Cleansing Hypochlorus Acid 05/07/25 1400   Wound Cleansing Hypochlorus Acid;Foam Cleanser/Washcloth 06/04/25 1703   Periwound Protectant No-sting Skin Prep;Zinc paste 06/04/25 1703   Dressing Changed Changed 05/07/25 1400   Dressing Cleansing/Solutions Not Applicable 06/04/25 1703   Dressing Options Hydrofiber Silver;Super Absorbent Pad;Sacral Dressing - Offloading;Hypafix Tape 06/04/25 1703   Dressing Change/Treatment Frequency Monday, Wednesday, Friday, and As Needed 06/04/25 1703   Wound Team Following Weekly 05/07/25 1400   WOUND NURSE ONLY - Pressure Injury Stage 4 06/04/25 1703   Non-staged Wound Description Not applicable 05/07/25 1400   Wound Length (cm) 4.5 cm 06/04/25 1703   Wound Width (cm) 1.5 cm 06/04/25 1703   Wound Depth (cm) 2.2 cm 06/04/25 1703   Wound Surface Area (cm^2) 5.3 cm^2 06/04/25 1703   Wound Volume (cm^3) 7.775 cm^3 06/04/25 1703   Post-Procedure Length (cm) 4.6 cm 06/04/25 1703   Post-Procedure Width (cm) 1.5 cm 06/04/25 1703   Post-Procedure Depth (cm) 2.2 cm 06/04/25 1703   Post-Procedure Surface Area (cm^2) 5.42 cm^2 06/04/25 1703   Post-Procedure Volume (cm^3) 7.948 cm^3 06/04/25 1703   Wound Healing % -3434 06/04/25 1703   Tunneling (cm) 0 cm 06/04/25 1703   Undermining (cm) 0 cm 06/04/25 1703   Undermining of Wound, 1st Location From 10 o'clock;To 6 o'clock 05/21/25 1530   Undermining (cm) - 2nd location 0 cm 05/28/25 1600   Undermining of Wound, 2nd Location From 5 o'clock;To 7 o'clock 05/07/25 1400   Wound Odor None 06/04/25 1703   Exposed Structures Fascia  "06/04/25 1703   Number of days: 399       PROCEDURE: Excisional debridement of right and left ischial wounds  -2% viscous lidocaine applied topically to wound bed for approximately 5 minutes prior to debridement  -Curette used to debride both wound beds.  Excisional debridement was performed to remove devitalized tissue until healthy, bleeding tissue was visualized.  Total area debrided was 19.05 cm², including into undermined areas of wounds.  Tissue debrided into the muscle / fascia layer.    -Bleeding controlled with manual pressure.  - Hypergranulation tissue treated with silver nitrate  -Wound care completed by wound RN, refer to flowsheet  -Patient tolerated the procedure well, without c/o pain or discomfort.    Pertinent Labs and Diagnostics:    Labs:     A1c: No results found for: \"HBA1C\"     [facility] results, 7/1/2022 (under media tab)    CRP 13    ESR 31      IMAGING:     X-ray left tib-fib ordered 2/16/2024 through [facility]    12/11/2023-CT of abdomen pelvis with contrast  IMPRESSION:   1.  Right ischial decubitus ulcer extending to bone with soft tissue gas tracking along the right perineum. Appearance suggesting osteomyelitis, consider component of necrotizing fasciitis as clinically appropriate.  2.  Small pericardial effusion  3.  Left adrenal nodule, density on prior noncontrast CT demonstrates adenoma.  4.  Hepatomegaly  5.  Enlarged prostate, workup and evaluation for causes of prostate enlargement recommended as clinically appropriate.  6.  Atherosclerosis and atherosclerotic coronary artery disease    12/15/2023-bone scan of left foot  IMPRESSION:     1.  Mild increased activity in the LEFT 1st and 3rd toes on blood pool and delayed images possibly indicating inflammation/infection.  2.  No significant blood flow asymmetry.          VASCULAR STUDIES: No results found.    LAST  WOUND CULTURE:   Lab Results   Component Value Date/Time    CULTRSULT Usual skin ade 10-50,000 cfu/mL (A) " 04/12/2025 09:45 PM    CULTRSULT Proteus mirabilis  ,000 cfu/mL   (A) 04/12/2025 09:45 PM      PATHOLOGY  2/17/2023-bone fragment extracted from left lower extremity wound\\  FINAL DIAGNOSIS:     A. Left leg bone fragment at base of chronic wound:          Extensively degenerated fibrocartilaginous tissue with a rim of           fibrinopurulent debris          Correlate with culture findings            Comment: While no residual intact bone is identified, these           findings are suggestive of adjacent osteomyelitis.      ASSESSMENT AND PLAN:     1. Sacral decubitus ulcer, stage IV (HCC)  Comments: Ulcer first noted in early April 2022 as small open area which quickly enlarged.  This ulcer is present distal from previous sacral ulcer which healed after surgery in January.  Patient has history of flap reconstruction x2 to this area.    6/4/2025: Remains resolved.  - Continue to protect area with pressure relieving sacral foam dressing.  -Patient does spend a lot of time up in his wheelchair, and wishes to continue to do so for his quality of life.  He lives independently, drives, and is involved with family activities.    -His has a new cushion in his wheelchair.  Has yet to pick out a new wheelchair  - Known OM that was previously treated. CT scan done during recent hospitalization did not show OM of sacrum, however OM of the ischium noted.  -Patient is very well versed in pressure relief strategies  -Monitor site each clinic visit    Wound care: silicone adhesive foam dressing    2. Pressure injury of right ischium, stage 4 (HCC)  Comments: Abscess and OM found on CT during hospitalization in December 2023.  Patient underwent I&D with VAC placement.  IV antibiotics through 1/22/2024.    6/4/2025: Wound measuring smaller  - Excisional debridement of nonviable tissue from wound in clinic today, medically necessary to promote wound healing  - VAC discontinued previously.  -Patient to return to clinic weekly  for assessment, debridement, and dressing change.    -Home health to change dressing 2 times per week in between clinic visits.    -Patient is very well versed on pressure relief measures, has adequate surface on bed, alternating low air loss.      Wound care: Hydrofiber silver, superabsorbent foam, Silicone foam    3. Pressure injury of left ischium, stage 4 ([facility])    6/4/2025: Wound measuring smaller  - Excisional debridement of wound in clinic today, medically necessary to promote wound healing.  - Patient to return to clinic weekly for assessment, debridement, and dressing change  -VAC has been discontinued  -Home health to change dressing 2 times per week in between clinic visits.    -Patient has new cushion in his wheelchair, see above  -Patient is very well versed on pressure relief measures, has adequate surface on bed, alternating low air loss.  - Home health had difficulty obtaining enluxtra. Due to increased drainage, will hold hydrofera blue classic and apply multiple layer of hydrofiber to base of wound    Wound care: Hydrofiber silver, superabsorbent foam, Silicone foam    4. Other acute osteomyelitis, other site ([facility])    6/4/2025: Bone fragments removed during clinic visit in June were sent for pathology, polymicrobial, most concerning was an MDR ESBL Proteus.   -Patient completed IV antibiotics.  No further antibiotics at this time per ID  -Patient understands he has a very low threshold for infection/sepsis.  He understands he is to go to the emergency room immediately if he begins to experience fevers, chills, nausea or vomiting, or if he notices radiating erythema or purulent drainage from his wounds.    5. Quadriplegia, C5-C7 incomplete ([facility])  Comments: Complicating factor.  Impaired mobility and sensation  -Patient is still spending 7-8 hours/day up in his wheelchair and knows to reposition frequently.  Wears heel float boots bilaterally at all times  - Patient has new custom wheelchair seat. He  had refitting and appears he was not sitting back in chair enough which was causing undue pressure to IT. He is more aware of the correct positioning in chair.    Please note that this note may have been created using voice recognition software. I have worked with technical experts from [facility] to optimize the interface.  I have made every reasonable attempt to correct obvious errors, but there may be errors of grammar and possibly content that I did not discover before finalizing the note.

## 2025-06-06 ENCOUNTER — TELEPHONE (OUTPATIENT)
Dept: WOUND CARE | Facility: MEDICAL CENTER | Age: 75
End: 2025-06-06
Payer: MEDICARE

## 2025-06-06 NOTE — TELEPHONE ENCOUNTER
[facility] will be able to add a Saturday visit on June 14 and June 21 to increase dressing changes.

## 2025-06-09 ENCOUNTER — TELEPHONE (OUTPATIENT)
Dept: CARDIOLOGY | Facility: MEDICAL CENTER | Age: 75
End: 2025-06-09
Payer: MEDICARE

## 2025-06-09 NOTE — TELEPHONE ENCOUNTER
MT    Caller: Osvaldo Pate     Topic/issue: Pt called in regards to wanting to go over his BP pt stated that its been all over and recently pt has been having really high BP, most recent /103 That was at 2:45, he took it earlier 213/108 at 12 pt took 3 amlodipine but didn't change the bp much pt would like a call back, I did advise ER pt will go if he starts to feel any symptoms please advise.     Callback Number: [phone]    Thank you,     Yaakov GARCIA

## 2025-06-09 NOTE — TELEPHONE ENCOUNTER
Phone Number Called: [phone]    Call outcome: Spoke to patient regarding message below.    Message: Called to discuss patients concerns and questions regarding BP.   Pt reports his PCP has been adjusting the dosage of Amlodipine. PT reports he has been having lower and higher BP.  PT states he has been told this is due to his spinal cord injury.   Discussed with pt he has not been in seen in cardiology for over a year and half. Scheduled appt with MT for yearly follow up. Also discussed with pt to follow with PCP regarding medications adjustments and changes since she has been managing medication.   ER precautions give for inc hr sustaining 140+, sob at rest, syncope, worsening/intolerable symptoms such as lightheadedness, dizziness, sob, weakness etc.     Pt verbalized understanding.

## 2025-06-10 ENCOUNTER — APPOINTMENT (OUTPATIENT)
Dept: RADIOLOGY | Facility: MEDICAL CENTER | Age: 75
End: 2025-06-10
Attending: STUDENT IN AN ORGANIZED HEALTH CARE EDUCATION/TRAINING PROGRAM
Payer: MEDICARE

## 2025-06-10 ENCOUNTER — HOSPITAL ENCOUNTER (EMERGENCY)
Facility: MEDICAL CENTER | Age: 75
End: 2025-06-10
Attending: STUDENT IN AN ORGANIZED HEALTH CARE EDUCATION/TRAINING PROGRAM
Payer: MEDICARE

## 2025-06-10 VITALS
OXYGEN SATURATION: 96 % | HEIGHT: 70 IN | RESPIRATION RATE: 18 BRPM | HEART RATE: 84 BPM | BODY MASS INDEX: 30.78 KG/M2 | SYSTOLIC BLOOD PRESSURE: 122 MMHG | WEIGHT: 215 LBS | DIASTOLIC BLOOD PRESSURE: 60 MMHG | TEMPERATURE: 99.1 F

## 2025-06-10 DIAGNOSIS — T83.510A URINARY TRACT INFECTION ASSOCIATED WITH CYSTOSTOMY CATHETER, INITIAL ENCOUNTER (HCC): Primary | ICD-10-CM

## 2025-06-10 DIAGNOSIS — N39.0 URINARY TRACT INFECTION ASSOCIATED WITH CYSTOSTOMY CATHETER, INITIAL ENCOUNTER (HCC): Primary | ICD-10-CM

## 2025-06-10 DIAGNOSIS — N31.9 NEUROGENIC BLADDER: ICD-10-CM

## 2025-06-10 LAB
ALBUMIN SERPL BCP-MCNC: 3.8 G/DL (ref 3.2–4.9)
ALBUMIN/GLOB SERPL: 1 G/DL
ALP SERPL-CCNC: 92 U/L (ref 30–99)
ALT SERPL-CCNC: 7 U/L (ref 2–50)
ANION GAP SERPL CALC-SCNC: 13 MMOL/L (ref 7–16)
APPEARANCE UR: CLEAR
AST SERPL-CCNC: 15 U/L (ref 12–45)
BACTERIA #/AREA URNS HPF: ABNORMAL /HPF
BASOPHILS # BLD AUTO: 0.2 % (ref 0–1.8)
BASOPHILS # BLD: 0.02 K/UL (ref 0–0.12)
BILIRUB SERPL-MCNC: 0.8 MG/DL (ref 0.1–1.5)
BILIRUB UR QL STRIP.AUTO: NEGATIVE
BUN SERPL-MCNC: 18 MG/DL (ref 8–22)
CALCIUM ALBUM COR SERPL-MCNC: 9.3 MG/DL (ref 8.5–10.5)
CALCIUM SERPL-MCNC: 9.1 MG/DL (ref 8.5–10.5)
CASTS URNS QL MICRO: ABNORMAL /LPF (ref 0–2)
CHLORIDE SERPL-SCNC: 100 MMOL/L (ref 96–112)
CO2 SERPL-SCNC: 22 MMOL/L (ref 20–33)
COLOR UR: YELLOW
CREAT SERPL-MCNC: 0.62 MG/DL (ref 0.5–1.4)
EOSINOPHIL # BLD AUTO: 0.01 K/UL (ref 0–0.51)
EOSINOPHIL NFR BLD: 0.1 % (ref 0–6.9)
EPITHELIAL CELLS 1715: ABNORMAL /HPF (ref 0–5)
ERYTHROCYTE [DISTWIDTH] IN BLOOD BY AUTOMATED COUNT: 55.4 FL (ref 35.9–50)
GFR SERPLBLD CREATININE-BSD FMLA CKD-EPI: 100 ML/MIN/1.73 M 2
GLOBULIN SER CALC-MCNC: 3.8 G/DL (ref 1.9–3.5)
GLUCOSE SERPL-MCNC: 121 MG/DL (ref 65–99)
GLUCOSE UR STRIP.AUTO-MCNC: NEGATIVE MG/DL
HCT VFR BLD AUTO: 42.6 % (ref 42–52)
HGB BLD-MCNC: 13.8 G/DL (ref 14–18)
IMM GRANULOCYTES # BLD AUTO: 0.06 K/UL (ref 0–0.11)
IMM GRANULOCYTES NFR BLD AUTO: 0.6 % (ref 0–0.9)
KETONES UR STRIP.AUTO-MCNC: NEGATIVE MG/DL
LACTATE SERPL-SCNC: 1.6 MMOL/L (ref 0.5–2)
LEUKOCYTE ESTERASE UR QL STRIP.AUTO: ABNORMAL
LYMPHOCYTES # BLD AUTO: 0.52 K/UL (ref 1–4.8)
LYMPHOCYTES NFR BLD: 4.9 % (ref 22–41)
MCH RBC QN AUTO: 33.4 PG (ref 27–33)
MCHC RBC AUTO-ENTMCNC: 32.4 G/DL (ref 32.3–36.5)
MCV RBC AUTO: 103.1 FL (ref 81.4–97.8)
MICRO URNS: ABNORMAL
MONOCYTES # BLD AUTO: 0.77 K/UL (ref 0–0.85)
MONOCYTES NFR BLD AUTO: 7.3 % (ref 0–13.4)
NEUTROPHILS # BLD AUTO: 9.23 K/UL (ref 1.82–7.42)
NEUTROPHILS NFR BLD: 86.9 % (ref 44–72)
NITRITE UR QL STRIP.AUTO: NEGATIVE
NRBC # BLD AUTO: 0 K/UL
NRBC BLD-RTO: 0 /100 WBC (ref 0–0.2)
PH UR STRIP.AUTO: 7.5 [PH] (ref 5–8)
PLATELET # BLD AUTO: 168 K/UL (ref 164–446)
PMV BLD AUTO: 8.8 FL (ref 9–12.9)
POTASSIUM SERPL-SCNC: 4.2 MMOL/L (ref 3.6–5.5)
PROT SERPL-MCNC: 7.6 G/DL (ref 6–8.2)
PROT UR QL STRIP: NEGATIVE MG/DL
RBC # BLD AUTO: 4.13 M/UL (ref 4.7–6.1)
RBC # URNS HPF: ABNORMAL /HPF
RBC UR QL AUTO: ABNORMAL
SODIUM SERPL-SCNC: 135 MMOL/L (ref 135–145)
SP GR UR STRIP.AUTO: 1.01
UROBILINOGEN UR STRIP.AUTO-MCNC: 0.2 EU/DL
WBC # BLD AUTO: 10.6 K/UL (ref 4.8–10.8)
WBC #/AREA URNS HPF: ABNORMAL /HPF

## 2025-06-10 PROCEDURE — 85025 COMPLETE CBC W/AUTO DIFF WBC: CPT

## 2025-06-10 PROCEDURE — 700101 HCHG RX REV CODE 250: Performed by: STUDENT IN AN ORGANIZED HEALTH CARE EDUCATION/TRAINING PROGRAM

## 2025-06-10 PROCEDURE — 87086 URINE CULTURE/COLONY COUNT: CPT

## 2025-06-10 PROCEDURE — A9270 NON-COVERED ITEM OR SERVICE: HCPCS | Performed by: STUDENT IN AN ORGANIZED HEALTH CARE EDUCATION/TRAINING PROGRAM

## 2025-06-10 PROCEDURE — 99285 EMERGENCY DEPT VISIT HI MDM: CPT

## 2025-06-10 PROCEDURE — 83605 ASSAY OF LACTIC ACID: CPT

## 2025-06-10 PROCEDURE — 81001 URINALYSIS AUTO W/SCOPE: CPT

## 2025-06-10 PROCEDURE — 51705 CHANGE OF BLADDER TUBE: CPT

## 2025-06-10 PROCEDURE — 71045 X-RAY EXAM CHEST 1 VIEW: CPT

## 2025-06-10 PROCEDURE — 700102 HCHG RX REV CODE 250 W/ 637 OVERRIDE(OP): Performed by: STUDENT IN AN ORGANIZED HEALTH CARE EDUCATION/TRAINING PROGRAM

## 2025-06-10 PROCEDURE — 80053 COMPREHEN METABOLIC PANEL: CPT

## 2025-06-10 PROCEDURE — 87040 BLOOD CULTURE FOR BACTERIA: CPT

## 2025-06-10 PROCEDURE — 36415 COLL VENOUS BLD VENIPUNCTURE: CPT

## 2025-06-10 RX ORDER — ACETAMINOPHEN 500 MG
1000 TABLET ORAL ONCE
Status: COMPLETED | OUTPATIENT
Start: 2025-06-10 | End: 2025-06-10

## 2025-06-10 RX ORDER — GRANULES FOR ORAL 3 G/1
3 POWDER ORAL ONCE
Status: COMPLETED | OUTPATIENT
Start: 2025-06-10 | End: 2025-06-10

## 2025-06-10 RX ADMIN — GRANULES FOR ORAL SOLUTION 3 G: 3 POWDER ORAL at 22:10

## 2025-06-10 RX ADMIN — ACETAMINOPHEN 1000 MG: 500 TABLET, FILM COATED ORAL at 22:28

## 2025-06-10 ASSESSMENT — FIBROSIS 4 INDEX: FIB4 SCORE: 2.55

## 2025-06-11 ENCOUNTER — OFFICE VISIT (OUTPATIENT)
Dept: WOUND CARE | Facility: MEDICAL CENTER | Age: 75
End: 2025-06-11
Payer: MEDICARE

## 2025-06-11 VITALS — TEMPERATURE: 100.4 F

## 2025-06-11 DIAGNOSIS — L89.314 PRESSURE INJURY OF RIGHT ISCHIUM, STAGE 4 (HCC): ICD-10-CM

## 2025-06-11 DIAGNOSIS — L89.324 PRESSURE INJURY OF LEFT ISCHIUM, STAGE 4 (HCC): ICD-10-CM

## 2025-06-11 DIAGNOSIS — G82.54 QUADRIPLEGIA, C5-C7, INCOMPLETE (HCC): ICD-10-CM

## 2025-06-11 DIAGNOSIS — M86.18 OTHER ACUTE OSTEOMYELITIS, OTHER SITE (HCC): ICD-10-CM

## 2025-06-11 DIAGNOSIS — L89.154 SACRAL DECUBITUS ULCER, STAGE IV (HCC): Primary | ICD-10-CM

## 2025-06-11 PROCEDURE — 11043 DBRDMT MUSC&/FSCA 1ST 20/<: CPT | Performed by: STUDENT IN AN ORGANIZED HEALTH CARE EDUCATION/TRAINING PROGRAM

## 2025-06-11 PROCEDURE — 11043 DBRDMT MUSC&/FSCA 1ST 20/<: CPT

## 2025-06-11 PROCEDURE — 99214 OFFICE O/P EST MOD 30 MIN: CPT

## 2025-06-11 NOTE — PATIENT INSTRUCTIONS
"The following information is a summary of the education provided in the clinic today. This is not an exhaustive list of the education provided during your appointment.       DRESSING CHANGES    Keep your wound dressing clean, dry, and intact.     You may  shower with the dressing(s) off. Please do not take baths, or swim in the ocean, lakes, rivers, pools, or hot tubs.      Wounds do not need to \"air out\" or \"breathe\". Gently dry your wound before placing a new dressing.     After you get out of the shower, wash the wound a second time (with soap and water, wound cleanser, or saline). Gently dry the wound before you place a new dressing.       If you need to change your dressings at home, you should wash your wound. Use normal saline, wound cleanser, hypochlorous acid, or unscented soap and water. Do not use hydrogen peroxide or rubbing alcohol to clean your wounds. Hydrogen peroxide and rubbing alcohol will damage new cells and tissue. Do not use betadine or iodine unless told to by your wound care team.    Do not soak your wounds in epsom salt baths. This can worsen your wound(s) or delay wound healing. It can also lead to infection or maceration (tissue is too wet).     If you do not have home health, the clinic will give you with leftover supplies from your appointment. We do not give out extra dressing supplies. We will order you supplies through a DME company. Your insurance may or may not pay for all these supplies. The company will reach out to you if insurance does not cover supplies. These supplies will be sent to your home within a few days. If you do have home health, they will provide wound care supplies.     The dressings we use may change as your wound changes.     GENERAL HEALTH ADVICE   -Nutrition is important for wound healing. Unless told otherwise by your doctor, eat more protein and consider supplementing with a multi-vitamin, zinc, and vitamin C.       OFFLOADING  -Offloading is important in " helping treat pressure injuries. If you have a pressure injury, it's important to keep weight off of it to help wound healing. Offloading cushions help redistribute pressure. You should also change positions frequently, especially when sitting in a chair. You should reposition at least every 20-30 minutes. While some dressings help reduce pressure, but offloading cushions and regular repositioning are also necessary.       CLINIC INFORMATION  The clinic's hours are Monday-Friday, 7:30 AM to 5:00 PM. We are closed most holidays and on weekends. If you leave us a message, please allow 24 hours for someone to return your call. If you have concerns or are having a medical emergency, call 911 or go to the hospital emergency room.     You might not see the same nurse or provider every visit.     If you notice any large changes in your wound(s), or signs of infection (redness, swelling, localized heat, increased pain, fever > 101 F, chills, nausea/vomiting) or have any questions about your home care instructions, please call the wound center at [phone]. If it's after hours, contact your primary care physician or go to the hospital emergency room. If you are admitted to any hospital, you will need a new referral to come back to the wound clinic. Any wound care appointments that you already have may be cancelled.    If you are 5 or more minutes late for an appointment, we reserve the right to cancel and reschedule that appointment. For example, if your appointment is at 1:00 PM, and you arrive at 1:06 PM, you are more than five minutes late and might not be seen. If you are consistently late or not coming to your appointments (typically 3 late cancellations and/or no shows), we reserve the right to cancel your future appointments or discharge you from the clinic. It is then your responsibility to obtain a new referral if wound care is still needed.

## 2025-06-11 NOTE — DISCHARGE INSTRUCTIONS
As discussed, the fosfomycin should treat your infection, please call your infectious disease doctor in the morning to establish follow-up, so they can review your results.  Your cultures are pending.  Return for weakness, vomiting, severe worsening symptoms

## 2025-06-11 NOTE — PROGRESS NOTES
Wound dressings were completely saturated and were dislodged from wound beds at the distal end. Per previous home health orders, we had asked that hypafix tape be used to increase the wear time of the dressings. This was not done as ordered and as such, the dressings were leaking. Asked that home health picture frame the dressings with hypafix tape if needed, but at the least, apply at the proximal and distal ends of the dressing.     Updated wound care orders faxed to [facility] via [employer].

## 2025-06-11 NOTE — ED NOTES
Discharge instructions given and discussed. RX for ABX given and patient educated to come back to ER for new or worsening symptoms. Instructed to follow up with Infectious Disease. Patient verbalized understanding. All IV's removed. GCS of 15.

## 2025-06-11 NOTE — ED NOTES
Pt suprapubic cath changed . 24 FR with 30ml balloon  placed pt tolerated well urine returned immediately.

## 2025-06-11 NOTE — PROGRESS NOTES
Provider Encounter- Pressure Injury        HISTORY OF PRESENT ILLNESS  Wound History:    START OF CARE IN CLINIC: 1/31/2024 (return to clinic after hospitalization)    REFERRING PROVIDER: Racquel York       WOUNDS-superior coccyx pressure injury, stage IV-resolved                                 Coccyx pressure injury, stage IV-resolved           Inferior coccyx pressure injury, stage IV resolved                                 Right ischial pressure injury, stage IV                                 Left heel pressure injury, recurring stage III-resolved                                 Left posterior lower leg/calf-stage III-resolved                                 Left medial lower leg surgical wound dehiscence-resolved, reopens frequently-resolved            Left ischial pressure injury, stage IV-first observed in clinic 5/1/2024          HISTORY: Patient with history of incomplete quadriplegia referred to [facility] for treatment of a stage IV pressure injury.  He has a history of previous pressure injuries to this area, and underwent muscle flaps in 2019, and then again in 2020.  He was seen in the wound clinic in November 2021 for an ulcer proximal from his current ulcer, and pressure injuries to his left posterior lower leg and left heel.  At that time, it was discovered that the patient had retained VAC foam embedded in the wound bed of the sacral wound.  Attempts were made to get him back to his plastic surgeon, though unsuccessful.  In January he underwent surgical removal of VAC sponge along with excisional debridement of his sacral wound by Dr. Chaves.  After the surgery, his wound went on to heal without incident.   In early April 2022, his home health nurse noted a new sacral ulcer, below the previous ulcer which quickly tripled in size over the following weeks.  The ulcer to his left medial lower leg had also deteriorated, with bone visible at the base..  He was hospitalized from 4/22 until 4/27/2022 and  underwent surgery with Dr. Raman on 4/26 for irrigation and debridement of multiple compartments of the left lower extremity, bone excision, and complex closure of chronic wound using biologic skin substitute.   His sacrococcygeal wound was not surgically addressed during this admission.  He was discharged back to his group home, with home health, and referral to outpatient wound clinic for his sacral wound.  He was instructed to follow-up with his surgeon for his lower leg wound.       Postoperatively, the left medial lower leg incision dehisced.  He was seen by his surgeon at [facility] on 5/11.  The surgeon opted to leave remaining sutures in place, and refer him to the wound clinic for treatment of this wound.   Treatment of this wound was initiated in clinic on 5/12.  During this visit was also noted that his heel DTI had resolved, but that he had a new pressure injury to his left posterior lower extremity.     A new pressure injury was noted to patient's right upper buttock/lower back on 5/20/2022.  Wound was linear in shape, skin discolored but intact.     Abrasion noted to left anterior lower leg.  First observed in clinic on 7/22/2022.  Patient states he bumped his leg into his food tray.     Small DTI noted to patient's left lateral lower leg on 7/29/2022.  Skin intact but discolored.     Large area of deep tissue injury noted to patient's left exterior lower leg.  Patient denied any trauma to this area.  Skin intact.  Wound documented.    1/27/2023: Patient was [place] [facility] [place]23[place]1/25/2023 with gross hematuria. He underwent RICHARD which showed watchman device was in place and he was taken off of Xarelto. While hospitalized wound team was consulted. He was referred back to [facility] and home health upon discharge.    Patient was hospitalized at [facility] for pyelonephritis from 2/26 until 3/2/2023, admitted for fever and general malaise.  He was admitted and initially started on linezolid and meropenem for suspected  UTI and history of multidrug-resistant organisms.  Urine cultures were negative. ID was consulted, recommended CT of chest and abdomen,which were negative for acute findings. However, he was treated with 5 days total course of antibiotics for suspected UTI, and symptoms completely resolved.  During this admission, the inpatient wound team was consulted for treatment of his sacral and lower leg wounds.  A wound culture was taken from his left heel pressure ulcer, negative.  Once stabilized, he was discharged home and referred back to [facility] to resume treatment of his wounds.    Patient was hospitalized at [facility] from 12/11 until 12/23/2023, admitted for fever.  Wound infection suspected.  CT scan of abdomen and pelvis for evaluation of sacral pressure injury showed gas tracking down to the bone consistent with osteomyelitis.  He underwent I&D of right ischial ulcer (documented as buttock) with Dr. Bansal, medial tract leading to an abscess was identified.  Cavity was opened allowing it to drain into the main wound bed.  Wound VAC was placed and managed by wound team during this admission.  A bone scan of patient's left foot was also done, initially concerning for osteomyelitis.  Orthopedic surgery was consulted and did not recommend surgical intervention. ID consulted also, recommended the patient to receive IV ertapenem 1 g every 24 hours plus IV daptomycin 8 mg/kg every 24 hours through 1/22/2024.   He was discharged to [facility] on 1/23 for IV antibiotics and wound care.  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  Never used smokeless tobacco    Patient's problem list, allergies, and current medications reviewed and updated in Epic    Interval History:  Interval History thinned 7/29/2022.  Please see previous notes for complete interval history.   Interval History thinned 1/27/2023. Please see previous note for complete interval history.  Interval History thinned 3/3/2023.  Please see previous notes for complete interval history.    Interval History thinned 8/4/2023.  Please see previous notes for complete interval history.    Interval History thinned 1/31/2024.  Please see previous notes for complete interval history.    Interval History thinned 6/26/2024.  Please see previous notes for complete interval history.  Interval History thinned 4/29/2025.  Please see previous notes for complete interval history.         4/23/2025: Clinic visit with Steve Hodges MD. Patient spent more time in chair this week with Easter and wounds slightly larger though measurements are fairly positional. Less maceration and saturation of cover dressing with enluxtra, will continue.    4/30/2025: Clinic visit with Steve Hodges MD. Patient reports doing ok. Denies any acute issues. Wounds stable, continues to have heavy drainage. Will hold enluxtra today and change to superabsorbent pad to see if will manage drainage better.    5/7/2025: Clinic visit with Steve Hodges MD. Patient feels that drainage is adequately managed with current dressing. He denies any acute issues. Wounds stable.    5/14/2025: Clinic visit with Steve Hodges MD. Patient reports doing ok, denies any fevers or chills. Wounds stable. He feels that drainage has been adequately managed. He was seen in ED due to occlusion of catheter due to sediment. Recently completed treatment for UTI.    5/21/2025: Clinic visit with Steve Hodges MD. Patient reports doing well. He continues to have heavy drainage from wounds,  though no maceration. Right ischial wound larger. Left ischial wound smaller.    5/28/2025: Clinic visit with Steve Hodges MD. Patient reports doing ok. Appears that we are managing drainage better. He denies any complaints.    6/4/2025: Clinic visit with Steve Hodges MD. Patient reports doing well. Denies any acute issues. He reports that he continues to use tilt feature in wheelchair every 30 minutes. Wounds are stable.    6/11/2025: Clinic visit with Steve Hodges MD. Patient reports feeling ok, subjective fevers but otherwise asymptomatic. He was in ED yesterday with concerns of fever, they exchanged suprapubic catheter and obtained new urine culture which is in process. He received dose of fosfomycin and was discharged. Patient is concerned as temp in clinic today is borderline (100.4). He cannot get a hold of ID. I called ID APRN who will follow up with his calls.   Patient's right IT wound improved, left is stable / slightly larger though is positional. Wounds do not appear to be infected today. We discussed hyperbaric oxygen therapy, he is not interested in time commitment and not sure that there is hyperbaric oxygen clinic that would be able to accommodate quadriplegia.    REVIEW OF SYSTEMS:   Unchanged from previous wound clinic assessment on 6/4/2025, except as noted in interval history above.      PHYSICAL EXAMINATION:   Temp 38 °C (100.4 °F) (Temporal)   Physical Exam  Constitutional:       Appearance: He is obese.   Cardiovascular:      Rate and Rhythm: Normal rate.   Pulmonary:      Effort: Pulmonary effort is normal.   Abdominal:      Comments: Colostomy left lower quadrant   Genitourinary:     Comments: Suprapubic catheter to down drain   Skin:     Comments: Stage IV pressure injury to right ischium: Wound slightly smaller, less hypergranulation tissue. Heavy serous drainage. Does not appear infected.    Stage IV pressure injury of left ischium: Wound stable, measures larger though is  positional. Heavy serous drainage. Does not appear infected.    Stage IV pressure injury sacrum: Remains resolved    All other wounds remain healed, high risk for recurrence     Neurological:      Mental Status: He is alert and oriented to person, place, and time.   Psychiatric:         Mood and Affect: Mood normal.         WOUND ASSESSMENT  Wound 12/12/23 Pressure Injury Ischium Right (Active)   Wound Image    06/11/25 1530   Site Assessment Pink;Red;Yellow 06/11/25 1530   Periwound Assessment Maceration;Scar tissue 06/11/25 1530   Margins Attached edges 06/11/25 1530   Closure Secondary intention 04/02/25 1559   Drainage Amount Large 06/11/25 1530   Drainage Description Serosanguineous 06/11/25 1530   Treatments Cleansed;Provider debridement;Site care 06/11/25 1530   Offloading/DME Offloading cushion;Sacral offloading dressing 06/11/25 1530   ** Retired** Wound Cleansing Hypochlorus Acid 05/07/25 1400   Wound Cleansing Foam Cleanser/Washcloth;Hypochlorus Acid 06/11/25 1530   Periwound Protectant No-sting Skin Prep;TRIAD paste 06/11/25 1530   Dressing Status Old drainage 05/21/25 1530   Dressing Changed Changed 05/28/25 1600   Dressing Cleansing/Solutions Not Applicable 06/11/25 1530   Dressing Options Hydrofiber Silver;Super Absorbent Pad;Sacral Dressing - Offloading;Hypafix Tape 06/11/25 1530   Dressing Change/Treatment Frequency Monday, Wednesday, Friday, and As Needed 06/11/25 1530   WOUND NURSE ONLY - Pressure Injury Stage 4 06/11/25 1530   Non-staged Wound Description Full thickness 05/28/25 1600   Wound Length (cm) 6.5 cm 06/11/25 1530   Wound Width (cm) 2 cm 06/11/25 1530   Wound Depth (cm) 0.3 cm 06/11/25 1530   Wound Surface Area (cm^2) 10.21 cm^2 06/11/25 1530   Wound Volume (cm^3) 2.042 cm^3 06/11/25 1530   Post-Procedure Length (cm) 6.5 cm 06/11/25 1530   Post-Procedure Width (cm) 2 cm 06/11/25 1530   Post-Procedure Depth (cm) 0.4 cm 06/11/25 1530   Post-Procedure Surface Area (cm^2) 10.21 cm^2  06/11/25 1530   Post-Procedure Volume (cm^3) 2.723 cm^3 06/11/25 1530   Wound Healing % 96 06/11/25 1530   Tunneling (cm) 0 cm 06/11/25 1530   Tunneling Clock Position of Wound 6 05/14/25 1705   Undermining (cm) 0 cm 06/11/25 1530   Undermining of Wound, 1st Location From 12 o'clock;To 7 o'clock 05/07/25 1400   Undermining (cm) - 2nd location 0.5 cm 02/19/25 1500   Undermining of Wound, 2nd Location From 7 o'clock;From 12 o'clock;To 2 o'clock 02/19/25 1500   Wound Odor Mild 06/11/25 1530   Exposed Structures None 06/11/25 1530   Number of days: 547       Wound 05/01/24 Pressure Injury Ischium Left (Active)   Wound Image    06/11/25 1530   Site Assessment Red;Granulation tissue 06/11/25 1530   Periwound Assessment Maceration;Scar tissue 06/11/25 1530   Margins Attached edges;Unattached edges 06/11/25 1530   Closure Secondary intention 04/02/25 1559   Drainage Amount Copious 06/11/25 1530   Drainage Description Serosanguineous 06/11/25 1530   Treatments Cleansed;Provider debridement;Site care 06/11/25 1530   Offloading/DME Sacral offloading dressing;Offloading cushion 06/11/25 1530   ** Retired** Wound Cleansing Hypochlorus Acid 05/07/25 1400   Wound Cleansing Hypochlorus Acid;Foam Cleanser/Washcloth 06/11/25 1530   Periwound Protectant No-sting Skin Prep;TRIAD paste 06/11/25 1530   Dressing Changed Changed 05/07/25 1400   Dressing Cleansing/Solutions Not Applicable 06/11/25 1530   Dressing Options Hydrofiber Silver;Super Absorbent Pad;Sacral Dressing - Offloading;Hypafix Tape 06/11/25 1530   Dressing Change/Treatment Frequency Monday, Wednesday, Friday, and As Needed 06/11/25 1530   Wound Team Following Weekly 05/07/25 1400   WOUND NURSE ONLY - Pressure Injury Stage 4 06/11/25 1530   Non-staged Wound Description Not applicable 05/07/25 1400   Wound Length (cm) 5.3 cm 06/11/25 1530   Wound Width (cm) 1.2 cm 06/11/25 1530   Wound Depth (cm) 1.9 cm 06/11/25 1530   Wound Surface Area (cm^2) 5 cm^2 06/11/25 1530   Wound  "Volume (cm^3) 6.327 cm^3 06/11/25 1530   Post-Procedure Length (cm) 5.3 cm 06/11/25 1530   Post-Procedure Width (cm) 1.2 cm 06/11/25 1530   Post-Procedure Depth (cm) 2 cm 06/11/25 1530   Post-Procedure Surface Area (cm^2) 5 cm^2 06/11/25 1530   Post-Procedure Volume (cm^3) 6.66 cm^3 06/11/25 1530   Wound Healing % -2776 06/11/25 1530   Tunneling (cm) 0 cm 06/11/25 1530   Undermining (cm) 0 cm 06/11/25 1530   Undermining of Wound, 1st Location From 10 o'clock;To 6 o'clock 05/21/25 1530   Undermining (cm) - 2nd location 0 cm 05/28/25 1600   Undermining of Wound, 2nd Location From 5 o'clock;To 7 o'clock 05/07/25 1400   Wound Odor Mild 06/11/25 1530   Exposed Structures Fascia 06/11/25 1530   Number of days: 406       PROCEDURE: Excisional debridement of right and left ischial wounds  -2% viscous lidocaine applied topically to wound bed for approximately 5 minutes prior to debridement  -Curette used to debride both wound beds.  Excisional debridement was performed to remove devitalized tissue until healthy, bleeding tissue was visualized.  Total area debrided was 15.21 cm², including into undermined areas of wounds.  Tissue debrided into the muscle / fascia layer.    -Bleeding controlled with manual pressure.  -Wound care completed by wound RN, refer to flowsheet  -Patient tolerated the procedure well, without c/o pain or discomfort.    Pertinent Labs and Diagnostics:    Labs:     A1c: No results found for: \"HBA1C\"     [facility] results, 7/1/2022 (under media tab)    CRP 13    ESR 31      IMAGING:     X-ray left tib-fib ordered 2/16/2024 through [facility]    12/11/2023-CT of abdomen pelvis with contrast  IMPRESSION:   1.  Right ischial decubitus ulcer extending to bone with soft tissue gas tracking along the right perineum. Appearance suggesting osteomyelitis, consider component of necrotizing fasciitis as clinically appropriate.  2.  Small pericardial effusion  3.  Left adrenal nodule, density on prior " noncontrast CT demonstrates adenoma.  4.  Hepatomegaly  5.  Enlarged prostate, workup and evaluation for causes of prostate enlargement recommended as clinically appropriate.  6.  Atherosclerosis and atherosclerotic coronary artery disease    12/15/2023-bone scan of left foot  IMPRESSION:     1.  Mild increased activity in the LEFT 1st and 3rd toes on blood pool and delayed images possibly indicating inflammation/infection.  2.  No significant blood flow asymmetry.          VASCULAR STUDIES: No results found.    LAST  WOUND CULTURE:   Lab Results   Component Value Date/Time    CULTRSULT  06/10/2025 08:26 PM     No Growth  Note: Blood cultures are incubated for 5 days and  are monitored continuously.Positive blood cultures  are called to the RN and reported as soon as  they are identified.  Blood culture testing and Gram stain, if indicated, are  performed at [facility],  [address] [place] [address].  Positive blood  cultures are sent to [facility],  [address] [place] [address], for organism identification  and susceptibility testing.        PATHOLOGY  2/17/2023-bone fragment extracted from left lower extremity wound\\  FINAL DIAGNOSIS:     A. Left leg bone fragment at base of chronic wound:          Extensively degenerated fibrocartilaginous tissue with a rim of           fibrinopurulent debris          Correlate with culture findings            Comment: While no residual intact bone is identified, these           findings are suggestive of adjacent osteomyelitis.      ASSESSMENT AND PLAN:     1. Sacral decubitus ulcer, stage IV (HCC)  Comments: Ulcer first noted in early April 2022 as small open area which quickly enlarged.  This ulcer is present distal from previous sacral ulcer which healed after surgery in January.  Patient has history of flap reconstruction x2 to this area.    6/11/2025: Remains resolved.  - Continue to protect area with pressure relieving  sacral foam dressing.  -Patient does spend a lot of time up in his wheelchair, and wishes to continue to do so for his quality of life.  He lives independently, drives, and is involved with family activities.    -His has a new cushion in his wheelchair.  Has yet to pick out a new wheelchair  - Known OM that was previously treated. CT scan done during recent hospitalization did not show OM of sacrum, however OM of the ischium noted.  -Patient is very well versed in pressure relief strategies  -Monitor site each clinic visit    Wound care: silicone adhesive foam dressing    2. Pressure injury of right ischium, stage 4 ([facility])  Comments: Abscess and OM found on CT during hospitalization in December 2023.  Patient underwent I&D with VAC placement.  IV antibiotics through 1/22/2024.    6/11/2025: Wound measuring smaller, less hypergranular  - Excisional debridement of nonviable tissue from wound in clinic today, medically necessary to promote wound healing  - VAC discontinued previously.  -Patient to return to clinic weekly for assessment, debridement, and dressing change.    -Home health to change dressing 2 times per week in between clinic visits.    -Patient is very well versed on pressure relief measures, has adequate surface on bed, alternating low air loss.  Wound care: Hydrofiber silver, superabsorbent foam, Silicone foam    3. Pressure injury of left ischium, stage 4 ([facility])    6/11/2025: Wound stable. Wound measurements are positional.  - Excisional debridement of wound in clinic today, medically necessary to promote wound healing.  - Patient to return to clinic weekly for assessment, debridement, and dressing change  -VAC has been discontinued  -Home health to change dressing 2 times per week in between clinic visits.    -Patient has new cushion in his wheelchair, see above  -Patient is very well versed on pressure relief measures, has adequate surface on bed, alternating low air loss.  - Home health had difficulty  obtaining enluxtra. Due to increased drainage, will hold hydrofera blue classic and apply multiple layer of hydrofiber to base of wound    Wound care: Hydrofiber silver, superabsorbent foam, Silicone foam    4. Other acute osteomyelitis, other site ([facility])    6/11/2025: Bone fragments removed during clinic visit in June were sent for pathology, polymicrobial, most concerning was an MDR ESBL Proteus.   -Patient completed IV antibiotics.  No further antibiotics at this time per ID  -Patient understands he has a very low threshold for infection/sepsis.  He understands he is to go to the emergency room immediately if he begins to experience fevers, chills, nausea or vomiting, or if he notices radiating erythema or purulent drainage from his wounds.    5. Quadriplegia, C5-C7 incomplete ([facility])  Comments: Complicating factor.  Impaired mobility and sensation  -Patient is still spending 7-8 hours/day up in his wheelchair and knows to reposition frequently.  Wears heel float boots bilaterally at all times  - Patient has new custom wheelchair seat. He had refitting and appears he was not sitting back in chair enough which was causing undue pressure to IT. He is more aware of the correct positioning in chair.    My total time spent caring for the patient on the day of the encounter was 30 minutes, reviewing history, assessment, counseling and education, and coordination of care including discussing case and recent ED visit with ID APRN.  This does not include time spent on separately billable procedures/tests.      Please note that this note may have been created using voice recognition software. I have worked with technical experts from [facility] to optimize the interface.  I have made every reasonable attempt to correct obvious errors, but there may be errors of grammar and possibly content that I did not discover before finalizing the note.

## 2025-06-11 NOTE — ED PROVIDER NOTES
"ED Provider Note    CHIEF COMPLAINT  Chief Complaint   Patient presents with    Fever     Patient states he usually has a UTI when he has a fever. Patient has a suprapubic cath in place.        EXTERNAL RECORDS REVIEWED  Outpatient labs & studies cultures grew Proteus mirabilis, multiple resistances    HPI/ROS  LIMITATION TO HISTORY   Select: : None      Osvaldo Pate is a 74 y.o. male who presents with fever, malodorous urine.  He has a chronic suprapubic catheter, which was last changed about a week and a half ago at his urology office.  He has a ostomy, denies change in output.  He does report his blood pressure has been \"all over the map \"has a history of autonomic dysreflexia.  Reports fevers for the past day.  Last seen here on 5/11, for catheter malfunction.  Notes he does have some pressure wounds, but is seen regularly by wound care and that they have been unchanged/improving.    PAST MEDICAL HISTORY   has a past medical history of A-fib ([facility]), Acute cystitis without hematuria (10/23/2021), Acute UTI (06/18/2023), Arrhythmia, Atrial flutter ([facility]), Blood clotting disorder ([facility]), Bowel habit changes, Clot hematuria (01/23/2023), GERD (gastroesophageal reflux disease), Hypertension, Hypokalemia (06/18/2023), Kidney stones, Metabolic acidosis (06/18/2023), Neurogenic bladder, Open wound (11/18/2022), Quadriplegia, C5-C7 complete ([facility]), Sepsis secondary to UTI ([facility]) (06/17/2023), SIRS (systemic inflammatory response syndrome) ([facility]) (12/12/2023), and Suprapubic catheter ([facility]).    SURGICAL HISTORY   has a past surgical history that includes percutaneouospinning lower extremity; colostomy; colostomy takedown; colostomy (N/A, 07/27/2019); ulcer debridement (N/A, 08/21/2019); flap closure (08/21/2019); hernia repair; wound irrigation & debridement (12/20/2020); cystoscopy,insert ureteral stent (Left, 12/16/2021); cysto/uretero/pyeloscopy, dx (Left, 12/16/2021); lasertripsy (Left, 12/16/2021); " cystoscopy,insert ureteral stent (Left, 2022); cysto/uretero/pyeloscopy, dx (Left, 2022); lasertripsy (N/A, 2022); incision and drainage orthopedic (2022); incision and drainage orthopedic (Left, 2022); bone biopsy (Left, 2022); wound irrigation & debridement (Left, 2022); wound closure (Left, 2022); orthopedic osteotomy (Left, 2022); orif, ankle; and wound irrigation & debridement (Right, 2023).    FAMILY HISTORY  Family History   Problem Relation Age of Onset    Heart Disease Father        SOCIAL HISTORY  Social History     Tobacco Use    Smoking status: Former     Current packs/day: 0.00     Average packs/day: 1 pack/day for 10.0 years (10.0 ttl pk-yrs)     Types: Cigarettes     Start date: 1967     Quit date: 1977     Years since quittin.4    Smokeless tobacco: Never   Vaping Use    Vaping status: Never Used   Substance and Sexual Activity    Alcohol use: Yes     Alcohol/week: 4.2 oz     Types: 7 Standard drinks or equivalent per week     Comment: 2/day    Drug use: No    Sexual activity: Not on file       CURRENT MEDICATIONS  Home Medications       Reviewed by Paulie Matson R.N. (Registered Nurse) on 06/10/25 at 1825  Med List Status: Not Addressed     Medication Last Dose Status   acetaminophen (TYLENOL) 500 MG Tab  Active   amLODIPine (NORVASC) 10 MG Tab  Active   amLODIPine (NORVASC) 2.5 MG Tab  Active   Ascorbic Acid (VITAMIN C) 1000 MG Tab  Active   aspirin EC 81 MG EC tablet  Active   baclofen (LIORESAL) 20 MG tablet  Active   Cholecalciferol (VITAMIN D3) 2000 UNIT Cap  Active   diclofenac sodium (VOLTAREN) 1 % Gel  Active   docusate sodium (COLACE) 100 MG Cap  Active   ferrous sulfate 325 (65 Fe) MG tablet  Active   losartan (COZAAR) 50 MG Tab  Active   Magnesium 400 MG Tab  Active   melatonin 5 mg Tab  Active   methenamine hip (HIPPREX) 1 GM Tab  Active   NON SPECIFIED  Active   NON SPECIFIED  Active   omeprazole (PRILOSEC) 20  "MG delayed-release capsule  Active   polyethylene glycol 3350 (MIRALAX) 17 GM/SCOOP Powder  Active   Probiotic Product (PROBIOTIC PO)  Active   sennosides (SENOKOT) 8.6 MG Tab  Active   Simethicone (GAS-X PO)  Active   Sodium Hypochlorite (DAKINS 0.125%, 1/4 STRENGTH,) 0.125 % Solution  Active   solifenacin (VESICARE) 10 MG tablet  Active   Zinc 50 MG Tab  Active                    ALLERGIES  Allergies[1]    PHYSICAL EXAM  VITAL SIGNS: /60   Pulse 84   Temp 37.3 °C (99.1 °F) (Temporal)   Resp 18   Ht 1.778 m (5' 10\")   Wt 97.5 kg (215 lb)   SpO2 96%   BMI 30.85 kg/m²    General: Pleasant male, chronically ill-appearing in wheelchair, no acute distress.  Mildly distended.   Head: Normocephalic atraumatic  HENT: Extraocular motion intact  Neck: Supple, no rigidity  Cardiovascular: Regular rate and rhythm no murmurs rubs or gallops  Respiratory: Clear to auscultation bilaterally, equal chest rise and fall, no increased work of breathing  Abdomen: Soft  Left lower quadrant has an ostomy, pink mucosa, there is brown liquid stool.  Suprapubic has sediment, is dark yellow.  Mildly cloudy.  Musculoskeletal: Warm and well perfused, no peripheral edema  Neuro: Alert, no focal deficits    EKG/LABS  Labs Reviewed   CBC WITH DIFFERENTIAL - Abnormal; Notable for the following components:       Result Value    RBC 4.13 (*)     Hemoglobin 13.8 (*)     .1 (*)     MCH 33.4 (*)     RDW 55.4 (*)     MPV 8.8 (*)     Neutrophils-Polys 86.90 (*)     Lymphocytes 4.90 (*)     Neutrophils (Absolute) 9.23 (*)     Lymphs (Absolute) 0.52 (*)     All other components within normal limits   COMP METABOLIC PANEL - Abnormal; Notable for the following components:    Glucose 121 (*)     Globulin 3.8 (*)     All other components within normal limits   URINALYSIS - Abnormal; Notable for the following components:    Leukocyte Esterase Large (*)     Occult Blood Moderate (*)     All other components within normal limits   URINE " MICROSCOPIC (W/UA) - Abnormal; Notable for the following components:    WBC  (*)     RBC 6-10 (*)     All other components within normal limits   LACTIC ACID   BLOOD CULTURE   ESTIMATED GFR   LACTIC ACID   LACTIC ACID   URINE CULTURE(NEW)   BLOOD CULTURE   Labs concerning for UTI, with  WBCs, large leuk esterase, and ANC elevation.  I suspect patient does have some level of colonization, however his symptoms are consistent with previous episodes of urinary tract infection.  No evidence of endorgan dysfunction, lactate is normal, no evidence of acute kidney injury.    I have independently interpreted this EKG    RADIOLOGY/PROCEDURES     Radiologist interpretation:  DX-CHEST-PORTABLE (1 VIEW)   Final Result      1.  Small left pleural effusion with adjacent airspace disease.   2.  Cardiomegaly.          COURSE & MEDICAL DECISION MAKING    ASSESSMENT, COURSE AND PLAN  Care Narrative: 74-year-old male, partial quadriplegia, neurogenic bladder, chronic indwelling suprapubic catheter, and osto ostomy presenting with fever, malodorous urine for the past several days     .  He does follow with infectious disease, he  was growing Proteus mirabilis previously on most recent cultures in April.      He is well-appearing, he has had no vomiting.  Exam is overall reassuring.  No tachycardia.  No tachypnea.  No hypotension.    Labs discussed above, ultimately after reviewing cultures, discussion with pharmacy, given suspicion for lower urinary tract infection, will treat with single dose of fosfomycin.  Patient encouraged to discuss with infectious disease in the morning, as if he fails treatment, he will likely require IV antibiotics if not another dose of fosfomycin    I reviewed this plan of care with the patient he is agreeable to this plan, is discharged in no acute distress.  Suprapubic catheter was exchanged prior to culture.    No sepsis, no endorgan dysfunction, no elevated lactic acid        DISPOSITION AND  "DISCUSSIONS      Discussion of management with other Q or appropriate source(s): Pharmacy Amy     Escalation of care considered, and ultimately not performed:diagnostic imaging and acute inpatient care management, however at this time, the patient is most appropriate for outpatient management      Decision tools and prescription drugs considered including, but not limited to: Antibiotics fosfomycin based on symptoms, recent culture.    FINAL DIAGNOSIS  1. Urinary tract infection associated with cystostomy catheter, initial encounter ([facility])    2. Neurogenic bladder         Electronically signed by: Dario Rock M.D., 6/10/2025 7:52 PM           [1]   Allergies  Allergen Reactions    Sulfa Drugs Rash     Rash, developed this back in 2015 after being placed on \"sulfa antibiotic for my wound\". Antibiotic was stopped and rash went away. Patient states he had a sulfa antibiotic prior to that time back when he was younger w/o a reaction.        "

## 2025-06-11 NOTE — ED TRIAGE NOTES
"Patient presents to the ER with the following complaints:    Chief Complaint   Patient presents with    Fever     Patient states he usually has a UTI when he has a fever. Patient has a suprapubic cath in place.        /68   Pulse 80   Temp (!) 38.6 °C (101.5 °F) Comment: 1000 mg of tylenol 30 min ago.  Resp 18   Ht 1.778 m (5' 10\")   Wt 97.5 kg (215 lb)   SpO2 94%   BMI 30.85 kg/m²       "

## 2025-06-12 LAB
BACTERIA UR CULT: NORMAL
SIGNIFICANT IND 70042: NORMAL
SITE SITE: NORMAL
SOURCE SOURCE: NORMAL

## 2025-06-13 ENCOUNTER — TELEPHONE (OUTPATIENT)
Dept: INFECTIOUS DISEASES | Facility: MEDICAL CENTER | Age: 75
End: 2025-06-13
Payer: MEDICARE

## 2025-06-13 NOTE — TELEPHONE ENCOUNTER
Caller Name: Anujm  Call Back Number: [phone]    How would the patient prefer to be contacted with a response: Phone call OK to leave a detailed message    Anjum called stating that he was returning a missed call from Anne. Per pt his S/S have improved as his temp has returned to normal and he is now able to eat and denies nausea. Pt stated that his urine has been clear which he stated was abnormal for him and he states he believes this may be due to the abx given to him in the ER.     Pt stated he received the results for [place] lab [place] and is concerned as there are several abnormalities noted on his results. Pt is requesting for these labs to reviewed by MD ORR.        Anne,   I received a message and reviewed the chart.  His urine culture is still pending. Please give him a call and see if his symptoms have improved?  If not he can follow-up with his PCP or in the ER.    Lisa Olson MD

## 2025-06-13 NOTE — TELEPHONE ENCOUNTER
Phone Number Called: [phone]    Call outcome: Spoke to patient regarding message below.    Message: I called pt to relay MD chicas, pt understood.     Lisa Olson M.D.  [place], [facility]  Caller: Unspecified (Today,  2:58 PM)  Jamie Tipton,    I'm glad he is feeling better.  The urine culture is negative.  If he has questions about the UA he can discuss it with his primary care provider.    Lisa

## 2025-06-15 LAB
BACTERIA BLD CULT: NORMAL
SIGNIFICANT IND 70042: NORMAL
SITE SITE: NORMAL
SOURCE SOURCE: NORMAL

## 2025-06-18 ENCOUNTER — OFFICE VISIT (OUTPATIENT)
Dept: WOUND CARE | Facility: MEDICAL CENTER | Age: 75
End: 2025-06-18
Payer: MEDICARE

## 2025-06-18 DIAGNOSIS — L89.324 PRESSURE INJURY OF LEFT ISCHIUM, STAGE 4 (HCC): ICD-10-CM

## 2025-06-18 DIAGNOSIS — L89.314 PRESSURE INJURY OF RIGHT ISCHIUM, STAGE 4 (HCC): ICD-10-CM

## 2025-06-18 DIAGNOSIS — L89.154 SACRAL DECUBITUS ULCER, STAGE IV (HCC): Primary | ICD-10-CM

## 2025-06-18 DIAGNOSIS — M86.18 OTHER ACUTE OSTEOMYELITIS, OTHER SITE (HCC): ICD-10-CM

## 2025-06-18 DIAGNOSIS — G82.54 QUADRIPLEGIA, C5-C7, INCOMPLETE (HCC): ICD-10-CM

## 2025-06-18 PROCEDURE — 11043 DBRDMT MUSC&/FSCA 1ST 20/<: CPT | Performed by: STUDENT IN AN ORGANIZED HEALTH CARE EDUCATION/TRAINING PROGRAM

## 2025-06-18 PROCEDURE — 11043 DBRDMT MUSC&/FSCA 1ST 20/<: CPT

## 2025-06-18 PROCEDURE — 11042 DBRDMT SUBQ TIS 1ST 20SQCM/<: CPT

## 2025-06-18 PROCEDURE — 99213 OFFICE O/P EST LOW 20 MIN: CPT

## 2025-06-18 NOTE — PATIENT INSTRUCTIONS
"The following information is a summary of the education provided in the clinic today. This is not an exhaustive list of the education provided during your appointment.       DRESSING CHANGES    Keep your wound dressing clean, dry, and intact. Change your dressing every 2 days AND/OR if the dressing becomes, soiled, leaks, gets wet, or falls off.      You may not shower with the dressing(s) off. Please do not take baths, or swim in the ocean, lakes, rivers, pools, or hot tubs.      Wounds do not need to \"air out\" or \"breathe\". Gently dry your wound before placing a new dressing.     After you get out of the shower, wash the wound a second time (with soap and water, wound cleanser, or saline). Gently dry the wound before you place a new dressing.       If you need to change your dressings at home, you should wash your wound. Use normal saline, wound cleanser, hypochlorous acid, or unscented soap and water. Do not use hydrogen peroxide or rubbing alcohol to clean your wounds. Hydrogen peroxide and rubbing alcohol will damage new cells and tissue. Do not use betadine or iodine unless told to by your wound care team.    Do not soak your wounds in epsom salt baths. This can worsen your wound(s) or delay wound healing. It can also lead to infection or maceration (tissue is too wet).     If you do not have home health, the clinic will give you with leftover supplies from your appointment. We do not give out extra dressing supplies. We will order you supplies through a [employer] company. Your insurance may or may not pay for all these supplies. The company will reach out to you if insurance does not cover supplies. These supplies will be sent to your home within a few days. If you do have home health, they will provide wound care supplies.     The dressings we use may change as your wound changes.         OFFLOADING  -Offloading is important in helping treat pressure injuries. If you have a pressure injury, it's important to keep " weight off of it to help wound healing. Offloading cushions help redistribute pressure. You should also change positions frequently, especially when sitting in a chair. You should reposition at least every 20-30 minutes. While some dressings help reduce pressure, but offloading cushions and regular repositioning are also necessary.   -Offloading cushions help relieve pressure from some wounds. You were given a handout today that explains the different types of cushions and where to buy them.         CLINIC INFORMATION  The clinic's hours are Monday-Friday, 7:30 AM to 5:00 PM. We are closed most holidays and on weekends. If you leave us a message, please allow 24 hours for someone to return your call. If you have concerns or are having a medical emergency, call 911 or go to the hospital emergency room.     You might not see the same nurse or provider every visit.     If you notice any large changes in your wound(s), or signs of infection (redness, swelling, localized heat, increased pain, fever > 101 F, chills, nausea/vomiting) or have any questions about your home care instructions, please call the wound center at [phone]. If it's after hours, contact your primary care physician or go to the hospital emergency room. If you are admitted to any hospital, you will need a new referral to come back to the wound clinic. Any wound care appointments that you already have may be cancelled.    If you are 5 or more minutes late for an appointment, we reserve the right to cancel and reschedule that appointment. For example, if your appointment is at 1:00 PM, and you arrive at 1:06 PM, you are more than five minutes late and might not be seen. If you are consistently late or not coming to your appointments (typically 3 late cancellations and/or no shows), we reserve the right to cancel your future appointments or discharge you from the clinic. It is then your responsibility to obtain a new referral if wound care is still  needed.

## 2025-06-18 NOTE — PROGRESS NOTES
Wound dressings leaking a little bit however they remained in place. Updated wound care orders sent to [facility] via [facility].

## 2025-06-19 NOTE — PROGRESS NOTES
Provider Encounter- Pressure Injury        HISTORY OF PRESENT ILLNESS  Wound History:    START OF CARE IN CLINIC: 1/31/2024 (return to clinic after hospitalization)    REFERRING PROVIDER: Racquel York       WOUNDS-superior coccyx pressure injury, stage IV-resolved                                 Coccyx pressure injury, stage IV-resolved           Inferior coccyx pressure injury, stage IV resolved                                 Right ischial pressure injury, stage IV                                 Left heel pressure injury, recurring stage III-resolved                                 Left posterior lower leg/calf-stage III-resolved                                 Left medial lower leg surgical wound dehiscence-resolved, reopens frequently-resolved            Left ischial pressure injury, stage IV-first observed in clinic 5/1/2024          HISTORY: Patient with history of incomplete quadriplegia referred to [facility] for treatment of a stage IV pressure injury.  He has a history of previous pressure injuries to this area, and underwent muscle flaps in 2019, and then again in 2020.  He was seen in the wound clinic in November 2021 for an ulcer proximal from his current ulcer, and pressure injuries to his left posterior lower leg and left heel.  At that time, it was discovered that the patient had retained VAC foam embedded in the wound bed of the sacral wound.  Attempts were made to get him back to his plastic surgeon, though unsuccessful.  In January he underwent surgical removal of VAC sponge along with excisional debridement of his sacral wound by Dr. Chaves.  After the surgery, his wound went on to heal without incident.   In early April 2022, his home health nurse noted a new sacral ulcer, below the previous ulcer which quickly tripled in size over the following weeks.  The ulcer to his left medial lower leg had also deteriorated, with bone visible at the base..  He was hospitalized from 4/22 until 4/27/2022 and  underwent surgery with Dr. Raman on 4/26 for irrigation and debridement of multiple compartments of the left lower extremity, bone excision, and complex closure of chronic wound using biologic skin substitute.   His sacrococcygeal wound was not surgically addressed during this admission.  He was discharged back to his group home, with home health, and referral to outpatient wound clinic for his sacral wound.  He was instructed to follow-up with his surgeon for his lower leg wound.       Postoperatively, the left medial lower leg incision dehisced.  He was seen by his surgeon at [facility] on 5/11.  The surgeon opted to leave remaining sutures in place, and refer him to the wound clinic for treatment of this wound.   Treatment of this wound was initiated in clinic on 5/12.  During this visit was also noted that his heel DTI had resolved, but that he had a new pressure injury to his left posterior lower extremity.     A new pressure injury was noted to patient's right upper buttock/lower back on 5/20/2022.  Wound was linear in shape, skin discolored but intact.     Abrasion noted to left anterior lower leg.  First observed in clinic on 7/22/2022.  Patient states he bumped his leg into his food tray.     Small DTI noted to patient's left lateral lower leg on 7/29/2022.  Skin intact but discolored.     Large area of deep tissue injury noted to patient's left exterior lower leg.  Patient denied any trauma to this area.  Skin intact.  Wound documented.    1/27/2023: [place] [facility] [place] with gross hematuria. He underwent RICHARD which showed watchman device was in place and he was taken off of Xarelto. While hospitalized wound team was consulted. He was referred back to [facility] and home health upon discharge.    Patient was hospitalized at [facility] for pyelonephritis from 2/26 until 3/2/2023, admitted for fever and general malaise.  He was admitted and initially started on linezolid and meropenem for suspected  UTI and history of multidrug-resistant organisms.  Urine cultures were negative. ID was consulted, recommended CT of chest and abdomen,which were negative for acute findings. However, he was treated with 5 days total course of antibiotics for suspected UTI, and symptoms completely resolved.  During this admission, the inpatient wound team was consulted for treatment of his sacral and lower leg wounds.  A wound culture was taken from his left heel pressure ulcer, negative.  Once stabilized, he was discharged home and referred back to [facility] to resume treatment of his wounds.    Patient was hospitalized at [facility] from 12/11 until 12/23/2023, admitted for fever.  Wound infection suspected.  CT scan of abdomen and pelvis for evaluation of sacral pressure injury showed gas tracking down to the bone consistent with osteomyelitis.  He underwent I&D of right ischial ulcer (documented as buttock) with Dr. Bansal, medial tract leading to an abscess was identified.  Cavity was opened allowing it to drain into the main wound bed.  Wound VAC was placed and managed by wound team during this admission.  A bone scan of patient's left foot was also done, initially concerning for osteomyelitis.  Orthopedic surgery was consulted and did not recommend surgical intervention. ID consulted also, recommended the patient to receive IV ertapenem 1 g every 24 hours plus IV daptomycin 8 mg/kg every 24 hours through 1/22/2024.   He was discharged to [facility] on 1/23 for IV antibiotics and wound care.  From the LTAC he was discharged home on 1/22 with home health and referral back to [facility] to resume management of his wounds      Pertinent Medical History: Incomplete quadriplegia, history of stage IV pressure injuries, history of flap procedures to sacral pressure injuries, osteomyelitis, obesity, colostomy in place   Contributing factors: Immobility and Obesity, impaired sensation    Personal support: Attendant-staff at assisted and home health  nursing    TOBACCO USE:   Former smoker, quit in 1977.  Never used smokeless tobacco    Patient's problem list, allergies, and current medications reviewed and updated in Epic    Interval History:  Interval History thinned 7/29/2022.  Please see previous notes for complete interval history.   Interval History thinned 1/27/2023. Please see previous note for complete interval history.  Interval History thinned 3/3/2023.  Please see previous notes for complete interval history.    Interval History thinned 8/4/2023.  Please see previous notes for complete interval history.    Interval History thinned 1/31/2024.  Please see previous notes for complete interval history.    Interval History thinned 6/26/2024.  Please see previous notes for complete interval history.  Interval History thinned 4/29/2025.  Please see previous notes for complete interval history.         4/23/2025: Clinic visit with Steve Hodges MD. Patient spent more time in chair this week with Easter and wounds slightly larger though measurements are fairly positional. Less maceration and saturation of cover dressing with enluxtra, will continue.    4/30/2025: Clinic visit with Steve Hodges MD. Patient reports doing ok. Denies any acute issues. Wounds stable, continues to have heavy drainage. Will hold enluxtra today and change to superabsorbent pad to see if will manage drainage better.    5/7/2025: Clinic visit with Steve Hodges MD. Patient feels that drainage is adequately managed with current dressing. He denies any acute issues. Wounds stable.    5/14/2025: Clinic visit with Steve Hodges MD. Patient reports doing ok, denies any fevers or chills. Wounds stable. He feels that drainage has been adequately managed. He was seen in ED due to occlusion of catheter due to sediment. Recently completed treatment for UTI.    5/21/2025: Clinic visit with Steve Hodges MD. Patient reports doing well. He continues to have heavy drainage from wounds,  though no maceration. Right ischial wound larger. Left ischial wound smaller.    5/28/2025: Clinic visit with Steve Hodges MD. Patient reports doing ok. Appears that we are managing drainage better. He denies any complaints.    6/4/2025: Clinic visit with Steve Hodges MD. Patient reports doing well. Denies any acute issues. He reports that he continues to use tilt feature in wheelchair every 30 minutes. Wounds are stable.    6/11/2025: Clinic visit with Steve Hodges MD. Patient reports feeling ok, subjective fevers but otherwise asymptomatic. He was in ED yesterday with concerns of fever, they exchanged suprapubic catheter and obtained new urine culture which is in process. He received dose of fosfomycin and was discharged. Patient is concerned as temp in clinic today is borderline (100.4). He cannot get a hold of ID. I called ID APRN who will follow up with his calls.   Patient's right IT wound improved, left is stable / slightly larger though is positional. Wounds do not appear to be infected today. We discussed hyperbaric oxygen therapy, he is not interested in time commitment and not sure that there is hyperbaric oxygen clinic that would be able to accommodate quadriplegia.    6/19/2025: Clinic visit with Steve Hodges MD. Patient reports feeling normal, denies any acute issues. Urine culture was negative, not started on Abx. Declines any further fevers. Wounds stable, have not improved. Discussed wounds not improving, discussed that will likely need more intensive offloading. His quality of life is most important to him and not willing to offload to level to improve wounds.    REVIEW OF SYSTEMS:   Unchanged from previous wound clinic assessment on 6/11/2025, except as noted in interval history above.      PHYSICAL EXAMINATION:   There were no vitals taken for this visit.  Physical Exam  Constitutional:       Appearance: He is obese.   Cardiovascular:      Rate and Rhythm: Normal rate.   Pulmonary:       Effort: Pulmonary effort is normal.   Abdominal:      Comments: Colostomy left lower quadrant   Genitourinary:     Comments: Suprapubic catheter to down drain   Skin:     Comments: Stage IV pressure injury to right ischium: Wound measures slightly larger, less hypergranulation tissue. Heavy serous drainage. Does not appear infected.    Stage IV pressure injury of left ischium: Wound measures larger though is positional. Heavy serous drainage. Does not appear infected.    Stage IV pressure injury sacrum: Remains resolved    All other wounds remain healed, high risk for recurrence     Neurological:      Mental Status: He is alert and oriented to person, place, and time.   Psychiatric:         Mood and Affect: Mood normal.         WOUND ASSESSMENT  Wound 12/12/23 Pressure Injury Ischium Right (Active)   Wound Image    06/18/25 1530   Site Assessment Pink;Red;Yellow 06/18/25 1530   Periwound Assessment Maceration;Scar tissue 06/18/25 1530   Margins Attached edges 06/18/25 1530   Closure Secondary intention 04/02/25 1559   Drainage Amount Large 06/18/25 1530   Drainage Description Serosanguineous 06/18/25 1530   Treatments Cleansed;Site care;Provider debridement 06/18/25 1530   Offloading/DME Offloading cushion;Sacral offloading dressing 06/18/25 1530   ** Retired** Wound Cleansing Hypochlorus Acid 05/07/25 1400   Wound Cleansing Foam Cleanser/Washcloth;Hypochlorus Acid 06/18/25 1530   Periwound Protectant TRIAD paste 06/18/25 1530   Dressing Status Old drainage 05/21/25 1530   Dressing Changed Changed 05/28/25 1600   Dressing Cleansing/Solutions Not Applicable 06/18/25 1530   Dressing Options Hydrofiber Silver;Super Absorbent Pad;Sacral Dressing - Offloading 06/18/25 1530   Dressing Change/Treatment Frequency Monday, Wednesday, Friday, and As Needed 06/18/25 1530   WOUND NURSE ONLY - Pressure Injury Stage 4 06/18/25 1530   Non-staged Wound Description Full thickness 05/28/25 1600   Wound Length (cm) 6 cm 06/18/25 1530    Wound Width (cm) 2.4 cm 06/18/25 1530   Wound Depth (cm) 0.3 cm 06/18/25 1530   Wound Surface Area (cm^2) 11.31 cm^2 06/18/25 1530   Wound Volume (cm^3) 2.262 cm^3 06/18/25 1530   Post-Procedure Length (cm) 6 cm 06/18/25 1530   Post-Procedure Width (cm) 2.1 cm 06/18/25 1530   Post-Procedure Depth (cm) 0.3 cm 06/18/25 1530   Post-Procedure Surface Area (cm^2) 9.9 cm^2 06/18/25 1530   Post-Procedure Volume (cm^3) 1.979 cm^3 06/18/25 1530   Wound Healing % 96 06/18/25 1530   Tunneling (cm) 0 cm 06/18/25 1530   Tunneling Clock Position of Wound 6 05/14/25 1705   Undermining (cm) 0.3 cm 06/18/25 1530   Undermining of Wound, 1st Location From 12 o'clock;To 3 o'clock 06/18/25 1530   Undermining (cm) - 2nd location 0.5 cm 02/19/25 1500   Undermining of Wound, 2nd Location From 7 o'clock;From 12 o'clock;To 2 o'clock 02/19/25 1500   Wound Odor Mild 06/18/25 1530   Exposed Structures None 06/18/25 1530   Number of days: 555       Wound 05/01/24 Pressure Injury Ischium Left (Active)   Wound Image    06/18/25 1530   Site Assessment Red;Granulation tissue 06/18/25 1530   Periwound Assessment Maceration;Scar tissue 06/18/25 1530   Margins Attached edges 06/18/25 1530   Closure Secondary intention 04/02/25 1559   Drainage Amount Copious 06/18/25 1530   Drainage Description Serosanguineous 06/18/25 1530   Treatments Cleansed;Site care;Provider debridement 06/18/25 1530   Offloading/DME Sacral offloading dressing;Offloading cushion 06/18/25 1530   ** Retired** Wound Cleansing Hypochlorus Acid 05/07/25 1400   Wound Cleansing Hypochlorus Acid;Foam Cleanser/Washcloth 06/18/25 1530   Periwound Protectant TRIAD paste 06/18/25 1530   Dressing Changed Changed 05/07/25 1400   Dressing Cleansing/Solutions Not Applicable 06/18/25 1530   Dressing Options Hydrofiber Silver;Super Absorbent Pad;Sacral Dressing - Offloading 06/18/25 1530   Dressing Change/Treatment Frequency Monday, Wednesday, Friday, and As Needed 06/18/25 1530   Wound Team  "Following Weekly 05/07/25 1400   WOUND NURSE ONLY - Pressure Injury Stage 4 06/18/25 1530   Non-staged Wound Description Not applicable 05/07/25 1400   Wound Length (cm) 5 cm 06/18/25 1530   Wound Width (cm) 2.4 cm 06/18/25 1530   Wound Depth (cm) 2 cm 06/18/25 1530   Wound Surface Area (cm^2) 9.42 cm^2 06/18/25 1530   Wound Volume (cm^3) 12.566 cm^3 06/18/25 1530   Post-Procedure Length (cm) 5.1 cm 06/18/25 1530   Post-Procedure Width (cm) 2 cm 06/18/25 1530   Post-Procedure Depth (cm) 2 cm 06/18/25 1530   Post-Procedure Surface Area (cm^2) 8.01 cm^2 06/18/25 1530   Post-Procedure Volume (cm^3) 10.681 cm^3 06/18/25 1530   Wound Healing % -5612 06/18/25 1530   Tunneling (cm) 0 cm 06/18/25 1530   Undermining (cm) 0 cm 06/18/25 1530   Undermining of Wound, 1st Location From 10 o'clock;To 6 o'clock 05/21/25 1530   Undermining (cm) - 2nd location 0 cm 05/28/25 1600   Undermining of Wound, 2nd Location From 5 o'clock;To 7 o'clock 05/07/25 1400   Wound Odor Mild 06/18/25 1530   Exposed Structures Fascia 06/18/25 1530   Number of days: 414       PROCEDURE: Excisional debridement of right and left ischial wounds  -2% viscous lidocaine applied topically to wound bed for approximately 5 minutes prior to debridement  -Curette used to debride both wound beds.  Excisional debridement was performed to remove devitalized tissue until healthy, bleeding tissue was visualized.  Total area debrided was 17.91 cm², including into undermined areas of wounds.  Tissue debrided into the muscle / fascia layer.    -Bleeding controlled with manual pressure.  -Wound care completed by wound RN, refer to flowsheet  -Patient tolerated the procedure well, without c/o pain or discomfort.    Pertinent Labs and Diagnostics:    Labs:     A1c: No results found for: \"HBA1C\"     [facility] results, 7/1/2022 (under media tab)    CRP 13    ESR 31      IMAGING:     X-ray left tib-fib ordered 2/16/2024 through [facility]    12/11/2023-CT of abdomen " pelvis with contrast  IMPRESSION:   1.  Right ischial decubitus ulcer extending to bone with soft tissue gas tracking along the right perineum. Appearance suggesting osteomyelitis, consider component of necrotizing fasciitis as clinically appropriate.  2.  Small pericardial effusion  3.  Left adrenal nodule, density on prior noncontrast CT demonstrates adenoma.  4.  Hepatomegaly  5.  Enlarged prostate, workup and evaluation for causes of prostate enlargement recommended as clinically appropriate.  6.  Atherosclerosis and atherosclerotic coronary artery disease    12/15/2023-bone scan of left foot  IMPRESSION:     1.  Mild increased activity in the LEFT 1st and 3rd toes on blood pool and delayed images possibly indicating inflammation/infection.  2.  No significant blood flow asymmetry.          VASCULAR STUDIES: No results found.    LAST  WOUND CULTURE:   Lab Results   Component Value Date/Time    CULTRSULT  06/10/2025 09:54 PM     Mixed enteric ade ,000 cfu/mL  3 or more organisms isolated, culture of doubtful  significance, please recollect.        PATHOLOGY  2/17/2023-bone fragment extracted from left lower extremity wound\\  FINAL DIAGNOSIS:     A. Left leg bone fragment at base of chronic wound:          Extensively degenerated fibrocartilaginous tissue with a rim of           fibrinopurulent debris          Correlate with culture findings            Comment: While no residual intact bone is identified, these           findings are suggestive of adjacent osteomyelitis.      ASSESSMENT AND PLAN:     1. Sacral decubitus ulcer, stage IV (HCC)  Comments: Ulcer first noted in early April 2022 as small open area which quickly enlarged.  This ulcer is present distal from previous sacral ulcer which healed after surgery in January.  Patient has history of flap reconstruction x2 to this area.    6/19/2025: Remains resolved.  - Continue to protect area with pressure relieving sacral foam dressing.  -Patient does  spend a lot of time up in his wheelchair, and wishes to continue to do so for his quality of life.  He lives independently, drives, and is involved with family activities.    -His has a new cushion in his wheelchair.  Has yet to pick out a new wheelchair  - Known OM that was previously treated. CT scan done during recent hospitalization did not show OM of sacrum, however OM of the ischium noted.  -Patient is very well versed in pressure relief strategies  -Monitor site each clinic visit    Wound care: silicone adhesive foam dressing    2. Pressure injury of right ischium, stage 4 (HCC)  Comments: Abscess and OM found on CT during hospitalization in December 2023.  Patient underwent I&D with VAC placement.  IV antibiotics through 1/22/2024.    6/19/2025: Wound measuring larger.  - Excisional debridement of nonviable tissue from wound in clinic today, medically necessary to promote wound healing  - VAC discontinued previously.  -Patient to return to clinic weekly for assessment, debridement, and dressing change.    -Home health to change dressing 2 times per week in between clinic visits.    -Patient is very well versed on pressure relief measures, has adequate surface on bed, alternating low air loss.  Wound care: Hydrofiber silver, superabsorbent foam, Silicone foam    3. Pressure injury of left ischium, stage 4 ([facility])    6/19/2025: Wound larger. Wound measurements are positional.  - Excisional debridement of wound in clinic today, medically necessary to promote wound healing.  - Patient to return to clinic weekly for assessment, debridement, and dressing change  -VAC has been discontinued  -Home health to change dressing 2 times per week in between clinic visits.    -Patient has new cushion in his wheelchair, see above  -Patient is very well versed on pressure relief measures, has adequate surface on bed, alternating low air loss.  - Home health had difficulty obtaining enluxtra. Due to increased drainage, will hold  hydrofera blue classic and apply multiple layer of hydrofiber to base of wound    Wound care: Hydrofiber silver, superabsorbent foam, Silicone foam    4. Other acute osteomyelitis, other site ([facility])    6/19/2025: Bone fragments removed during clinic visit in June were sent for pathology, polymicrobial, most concerning was an MDR ESBL Proteus.   -Patient completed IV antibiotics.  No further antibiotics at this time per ID  -Patient understands he has a very low threshold for infection/sepsis.  He understands he is to go to the emergency room immediately if he begins to experience fevers, chills, nausea or vomiting, or if he notices radiating erythema or purulent drainage from his wounds.    5. Quadriplegia, C5-C7 incomplete ([facility])  Comments: Complicating factor.  Impaired mobility and sensation  -Patient is still spending 7-8 hours/day up in his wheelchair and knows to reposition frequently.  Wears heel float boots bilaterally at all times  - Patient has new custom wheelchair seat. He had refitting and appears he was not sitting back in chair enough which was causing undue pressure to IT. He is more aware of the correct positioning in chair.    Please note that this note may have been created using voice recognition software. I have worked with technical experts from [employer] to optimize the interface.  I have made every reasonable attempt to correct obvious errors, but there may be errors of grammar and possibly content that I did not discover before finalizing the note.

## 2025-06-25 ENCOUNTER — OFFICE VISIT (OUTPATIENT)
Dept: WOUND CARE | Facility: MEDICAL CENTER | Age: 75
End: 2025-06-25
Payer: MEDICARE

## 2025-06-25 DIAGNOSIS — M86.18 OTHER ACUTE OSTEOMYELITIS, OTHER SITE (HCC): ICD-10-CM

## 2025-06-25 DIAGNOSIS — L89.154 SACRAL DECUBITUS ULCER, STAGE IV (HCC): ICD-10-CM

## 2025-06-25 DIAGNOSIS — G82.54 QUADRIPLEGIA, C5-C7, INCOMPLETE (HCC): ICD-10-CM

## 2025-06-25 DIAGNOSIS — L89.314 PRESSURE INJURY OF RIGHT ISCHIUM, STAGE 4 (HCC): ICD-10-CM

## 2025-06-25 DIAGNOSIS — L89.324 PRESSURE INJURY OF LEFT ISCHIUM, STAGE 4 (HCC): Primary | ICD-10-CM

## 2025-06-25 PROCEDURE — 11043 DBRDMT MUSC&/FSCA 1ST 20/<: CPT

## 2025-06-25 PROCEDURE — 99213 OFFICE O/P EST LOW 20 MIN: CPT

## 2025-06-25 PROCEDURE — 11046 DBRDMT MUSC&/FSCA EA ADDL: CPT

## 2025-06-25 PROCEDURE — 11043 DBRDMT MUSC&/FSCA 1ST 20/<: CPT | Performed by: STUDENT IN AN ORGANIZED HEALTH CARE EDUCATION/TRAINING PROGRAM

## 2025-06-25 PROCEDURE — 99214 OFFICE O/P EST MOD 30 MIN: CPT

## 2025-06-25 PROCEDURE — 11046 DBRDMT MUSC&/FSCA EA ADDL: CPT | Performed by: STUDENT IN AN ORGANIZED HEALTH CARE EDUCATION/TRAINING PROGRAM

## 2025-06-25 NOTE — PROGRESS NOTES
Updated wound care orders sent to [facility].

## 2025-06-25 NOTE — PROGRESS NOTES
Provider Encounter- Pressure Injury        HISTORY OF PRESENT ILLNESS  Wound History:    START OF CARE IN CLINIC: 1/31/2024 (return to clinic after hospitalization)    REFERRING PROVIDER: Racquel York       WOUNDS-superior coccyx pressure injury, stage IV-resolved                                 Coccyx pressure injury, stage IV-resolved           Inferior coccyx pressure injury, stage IV resolved                                 Right ischial pressure injury, stage IV                                 Left heel pressure injury, recurring stage III-resolved                                 Left posterior lower leg/calf-stage III-resolved                                 Left medial lower leg surgical wound dehiscence-resolved, reopens frequently-resolved            Left ischial pressure injury, stage IV-first observed in clinic 5/1/2024          HISTORY: Patient with history of incomplete quadriplegia referred to [facility] for treatment of a stage IV pressure injury.  He has a history of previous pressure injuries to this area, and underwent muscle flaps in 2019, and then again in 2020.  He was seen in the wound clinic in November 2021 for an ulcer proximal from his current ulcer, and pressure injuries to his left posterior lower leg and left heel.  At that time, it was discovered that the patient had retained VAC foam embedded in the wound bed of the sacral wound.  Attempts were made to get him back to his plastic surgeon, though unsuccessful.  In January he underwent surgical removal of VAC sponge along with excisional debridement of his sacral wound by Dr. Chaves.  After the surgery, his wound went on to heal without incident.   In early April 2022, his home health nurse noted a new sacral ulcer, below the previous ulcer which quickly tripled in size over the following weeks.  The ulcer to his left medial lower leg had also deteriorated, with bone visible at the base..  He was hospitalized from 4/22 until 4/27/2022 and  underwent surgery with Dr. Raman on 4/26 for irrigation and debridement of multiple compartments of the left lower extremity, bone excision, and complex closure of chronic wound using biologic skin substitute.   His sacrococcygeal wound was not surgically addressed during this admission.  He was discharged back to his group home, with home health, and referral to outpatient wound clinic for his sacral wound.  He was instructed to follow-up with his surgeon for his lower leg wound.       Postoperatively, the left medial lower leg incision dehisced.  He was seen by his surgeon at [facility] on 5/11.  The surgeon opted to leave remaining sutures in place, and refer him to the wound clinic for treatment of this wound.   Treatment of this wound was initiated in clinic on 5/12.  During this visit was also noted that his heel DTI had resolved, but that he had a new pressure injury to his left posterior lower extremity.     A new pressure injury was noted to patient's right upper buttock/lower back on 5/20/2022.  Wound was linear in shape, skin discolored but intact.     Abrasion noted to left anterior lower leg.  First observed in clinic on 7/22/2022.  Patient states he bumped his leg into his food tray.     Small DTI noted to patient's left lateral lower leg on 7/29/2022.  Skin intact but discolored.     Large area of deep tissue injury noted to patient's left exterior lower leg.  Patient denied any trauma to this area.  Skin intact.  Wound documented.    1/27/2023: Patient was admitted to [facility] from 1/23-1/25/2023 with gross hematuria. He underwent RICHARD which showed watchman device was in place and he was taken off of Xarelto. While hospitalized wound team was consulted. He was referred back to [facility] and home health upon discharge.    Patient was hospitalized at [facility] for pyelonephritis from 2/26 until 3/2/2023, admitted for fever and general malaise.  He was admitted and initially started on linezolid and meropenem for suspected  UTI and history of multidrug-resistant organisms.  Urine cultures were negative. ID was consulted, recommended CT of chest and abdomen,which were negative for acute findings. However, he was treated with 5 days total course of antibiotics for suspected UTI, and symptoms completely resolved.  During this admission, the inpatient wound team was consulted for treatment of his sacral and lower leg wounds.  A wound culture was taken from his left heel pressure ulcer, negative.  Once stabilized, he was discharged home and referred back to [facility] to resume treatment of his wounds.    Patient was hospitalized at [facility] from 12/11 until 12/23/2023, admitted for fever.  Wound infection suspected.  CT scan of abdomen and pelvis for evaluation of sacral pressure injury showed gas tracking down to the bone consistent with osteomyelitis.  He underwent I&D of right ischial ulcer (documented as buttock) with Dr. Bansal, medial tract leading to an abscess was identified.  Cavity was opened allowing it to drain into the main wound bed.  Wound VAC was placed and managed by wound team during this admission.  A bone scan of patient's left foot was also done, initially concerning for osteomyelitis.  Orthopedic surgery was consulted and did not recommend surgical intervention. ID consulted also, recommended the patient to receive IV ertapenem 1 g every 24 hours plus IV daptomycin 8 mg/kg every 24 hours through 1/22/2024.   He was discharged to [facility] on 1/23 for IV antibiotics and wound care.  From the LTAC he was discharged home on 1/22 with home health and referral back to [facility] to resume management of his wounds      Pertinent Medical History: Incomplete quadriplegia, history of stage IV pressure injuries, history of flap procedures to sacral pressure injuries, osteomyelitis, obesity, colostomy in place   Contributing factors: Immobility and Obesity, impaired sensation    Personal support: Attendant-staff [facility] and home health  nursing    TOBACCO USE:   Former smoker, quit in 1977.  Never used smokeless tobacco    Patient's problem list, allergies, and current medications reviewed and updated in Epic    Interval History:  Interval History thinned 7/29/2022.  Please see previous notes for complete interval history.   Interval History thinned 1/27/2023. Please see previous note for complete interval history.  Interval History thinned 3/3/2023.  Please see previous notes for complete interval history.    Interval History thinned 8/4/2023.  Please see previous notes for complete interval history.    Interval History thinned 1/31/2024.  Please see previous notes for complete interval history.    Interval History thinned 6/26/2024.  Please see previous notes for complete interval history.  Interval History thinned 4/29/2025.  Please see previous notes for complete interval history.         4/23/2025: Clinic visit with Steve Hodges MD. Patient spent more time in chair this week with Easter and wounds slightly larger though measurements are fairly positional. Less maceration and saturation of cover dressing with enluxtra, will continue.    4/30/2025: Clinic visit with Steve Hodges MD. Patient reports doing ok. Denies any acute issues. Wounds stable, continues to have heavy drainage. Will hold enluxtra today and change to superabsorbent pad to see if will manage drainage better.    5/7/2025: Clinic visit with Steve Hodges MD. Patient feels that drainage is adequately managed with current dressing. He denies any acute issues. Wounds stable.    5/14/2025: Clinic visit with Steve Hodges MD. Patient reports doing ok, denies any fevers or chills. Wounds stable. He feels that drainage has been adequately managed. He was seen in ED due to occlusion of catheter due to sediment. Recently completed treatment for UTI.    5/21/2025: Clinic visit with Steve Hodges MD. Patient reports doing well. He continues to have heavy drainage from wounds,  though no maceration. Right ischial wound larger. Left ischial wound smaller.    5/28/2025: Clinic visit with Steve Hodges MD. Patient reports doing ok. Appears that we are managing drainage better. He denies any complaints.    6/4/2025: Clinic visit with Steve Hodges MD. Patient reports doing well. Denies any acute issues. He reports that he continues to use tilt feature in wheelchair every 30 minutes. Wounds are stable.    6/11/2025: Clinic visit with Steve Hodges MD. Patient reports feeling ok, subjective fevers but otherwise asymptomatic. He was in ED yesterday with concerns of fever, they exchanged suprapubic catheter and obtained new urine culture which is in process. He received dose of fosfomycin and was discharged. Patient is concerned as temp in clinic today is borderline (100.4). He cannot get a hold of ID. I called ID APRN who will follow up with his calls.   Patient's right IT wound improved, left is stable / slightly larger though is positional. Wounds do not appear to be infected today. We discussed hyperbaric oxygen therapy, he is not interested in time commitment and not sure that there is hyperbaric oxygen clinic that would be able to accommodate quadriplegia.    6/19/2025: Clinic visit with Steve Hodges MD. Patient reports feeling normal, denies any acute issues. Urine culture was negative, not started on Abx. Declines any further fevers. Wounds stable, have not improved. Discussed wounds not improving, discussed that will likely need more intensive offloading. His quality of life is most important to him and not willing to offload to level to improve wounds.    6/25/2025: Clinic visit with Steve Hodges MD. Patient reports doing well. Denies any fevers or chills. Right wound smaller. Left wound larger, deeper, and continues to have heavy drainage. Discussed with patient possibility of referral to Dr. Baez to be evaluated for other potential treatment modalities and possibly surgery.  He is in agreement for referral and consultation.    REVIEW OF SYSTEMS:   Unchanged from previous wound clinic assessment on 6/19/2025, except as noted in interval history above.      PHYSICAL EXAMINATION:   There were no vitals taken for this visit.  Physical Exam  Constitutional:       Appearance: He is obese.   Cardiovascular:      Rate and Rhythm: Normal rate.   Pulmonary:      Effort: Pulmonary effort is normal.   Abdominal:      Comments: Colostomy left lower quadrant   Genitourinary:     Comments: Suprapubic catheter to down drain   Skin:     Comments: Stage IV pressure injury to right ischium: Wound stable, less hypergranulation tissue. Heavy serous drainage. Does not appear infected.    Stage IV pressure injury of left ischium: Wound measuring larger and deeper, heavy drainage. No evidence of gross infection.    Stage IV pressure injury sacrum: Remains resolved    All other wounds remain healed, high risk for recurrence     Neurological:      Mental Status: He is alert and oriented to person, place, and time.   Psychiatric:         Mood and Affect: Mood normal.         WOUND ASSESSMENT  Wound 12/12/23 Pressure Injury Ischium Right (Active)   Wound Image    06/25/25 1530   Site Assessment Red;Pink;Yellow 06/25/25 1530   Periwound Assessment Maceration;Scar tissue 06/25/25 1530   Margins Attached edges 06/25/25 1530   Closure Secondary intention 04/02/25 1559   Drainage Amount Large 06/25/25 1530   Drainage Description Serosanguineous 06/25/25 1530   Treatments Cleansed;Site care;Provider debridement 06/25/25 1530   Offloading/DME Offloading cushion;Sacral offloading dressing 06/25/25 1530   ** Retired** Wound Cleansing Hypochlorus Acid 05/07/25 1400   Wound Cleansing Hypochlorus Acid 06/25/25 1530   Periwound Protectant Zinc paste 06/25/25 1530   Dressing Status Old drainage 05/21/25 1530   Dressing Changed Changed 05/28/25 1600   Dressing Cleansing/Solutions Not Applicable 06/25/25 1530   Dressing Options  Hydrofiber Silver;Super Absorbent Pad;Sacral Dressing - Offloading 06/25/25 1530   Dressing Change/Treatment Frequency Monday, Wednesday, Friday, and As Needed 06/25/25 1530   WOUND NURSE ONLY - Pressure Injury Stage 4 06/25/25 1530   Non-staged Wound Description Full thickness 05/28/25 1600   Wound Length (cm) 6 cm 06/25/25 1530   Wound Width (cm) 3 cm 06/25/25 1530   Wound Depth (cm) 0.3 cm 06/25/25 1530   Wound Surface Area (cm^2) 14.14 cm^2 06/25/25 1530   Wound Volume (cm^3) 2.827 cm^3 06/25/25 1530   Post-Procedure Length (cm) 6 cm 06/25/25 1530   Post-Procedure Width (cm) 3.1 cm 06/25/25 1530   Post-Procedure Depth (cm) 0.3 cm 06/25/25 1530   Post-Procedure Surface Area (cm^2) 14.61 cm^2 06/25/25 1530   Post-Procedure Volume (cm^3) 2.922 cm^3 06/25/25 1530   Wound Healing % 95 06/25/25 1530   Tunneling (cm) 0 cm 06/25/25 1530   Tunneling Clock Position of Wound 6 05/14/25 1705   Undermining (cm) 0.3 cm 06/25/25 1530   Undermining of Wound, 1st Location From 12 o'clock;To 3 o'clock 06/25/25 1530   Undermining (cm) - 2nd location 0.5 cm 02/19/25 1500   Undermining of Wound, 2nd Location From 7 o'clock;From 12 o'clock;To 2 o'clock 02/19/25 1500   Wound Odor Mild 06/25/25 1530   Exposed Structures None 06/25/25 1530   Number of days: 561       Wound 05/01/24 Pressure Injury Ischium Left (Active)   Wound Image    06/25/25 1530   Site Assessment Red;[place] 06/25/25 1530   Periwound Assessment Maceration;Scar tissue 06/25/25 1530   Margins Attached edges 06/25/25 1530   Closure Secondary intention 04/02/25 1559   Drainage Amount Copious 06/25/25 1530   Drainage Description Serosanguineous 06/25/25 1530   Treatments Cleansed;Site care;Provider debridement 06/25/25 1530   Offloading/DME Sacral offloading dressing;Offloading cushion 06/25/25 1530   ** Retired** Wound Cleansing Hypochlorus Acid 05/07/25 1400   Wound Cleansing Hypochlorus Acid 06/25/25 1530   Periwound Protectant Zinc paste 06/25/25 1530   Dressing Changed  Changed 05/07/25 1400   Dressing Cleansing/Solutions Not Applicable 06/25/25 1530   Dressing Options Hydrofiber Silver;Super Absorbent Pad;Sacral Dressing - Offloading 06/25/25 1530   Dressing Change/Treatment Frequency Monday, Wednesday, Friday, and As Needed 06/25/25 1530   Wound Team Following Weekly 05/07/25 1400   WOUND NURSE ONLY - Pressure Injury Stage 4 06/25/25 1530   Non-staged Wound Description Not applicable 05/07/25 1400   Wound Length (cm) 5.5 cm 06/25/25 1530   Wound Width (cm) 2 cm 06/25/25 1530   Wound Depth (cm) 3 cm 06/25/25 1530   Wound Surface Area (cm^2) 8.64 cm^2 06/25/25 1530   Wound Volume (cm^3) 17.279 cm^3 06/25/25 1530   Post-Procedure Length (cm) 5.8 cm 06/25/25 1530   Post-Procedure Width (cm) 2 cm 06/25/25 1530   Post-Procedure Depth (cm) 3 cm 06/25/25 1530   Post-Procedure Surface Area (cm^2) 9.11 cm^2 06/25/25 1530   Post-Procedure Volume (cm^3) 18.221 cm^3 06/25/25 1530   Wound Healing % -7754 06/25/25 1530   Tunneling (cm) 0 cm 06/25/25 1530   Undermining (cm) 0 cm 06/25/25 1530   Undermining of Wound, 1st Location From 10 o'clock;To 6 o'clock 05/21/25 1530   Undermining (cm) - 2nd location 0 cm 05/28/25 1600   Undermining of Wound, 2nd Location From 5 o'clock;To 7 o'clock 05/07/25 1400   Wound Odor Mild 06/25/25 1530   Exposed Structures Fascia;Bone 06/25/25 1530   Number of days: 420       PROCEDURE: Excisional debridement of right and left ischial wounds  -2% viscous lidocaine applied topically to wound bed for approximately 5 minutes prior to debridement  -Curette used to debride both wound beds.  Excisional debridement was performed to remove devitalized tissue until healthy, bleeding tissue was visualized.  Total area debrided was 23.72 cm², including into undermined areas of wounds.  Tissue debrided into the muscle / fascia layer.    -Bleeding controlled with manual pressure.  -Wound care completed by wound RN, refer to flowsheet  -Patient tolerated the procedure well,  "without c/o pain or discomfort.    Pertinent Labs and Diagnostics:    Labs:     A1c: No results found for: \"HBA1C\"     [facility] results, 7/1/2022 (under media tab)    CRP 13    ESR 31      IMAGING:     X-ray left tib-fib ordered 2/16/2024 through [facility]    12/11/2023-CT of abdomen pelvis with contrast  IMPRESSION:   1.  Right ischial decubitus ulcer extending to bone with soft tissue gas tracking along the right perineum. Appearance suggesting osteomyelitis, consider component of necrotizing fasciitis as clinically appropriate.  2.  Small pericardial effusion  3.  Left adrenal nodule, density on prior noncontrast CT demonstrates adenoma.  4.  Hepatomegaly  5.  Enlarged prostate, workup and evaluation for causes of prostate enlargement recommended as clinically appropriate.  6.  Atherosclerosis and atherosclerotic coronary artery disease    12/15/2023-bone scan of left foot  IMPRESSION:     1.  Mild increased activity in the LEFT 1st and 3rd toes on blood pool and delayed images possibly indicating inflammation/infection.  2.  No significant blood flow asymmetry.          VASCULAR STUDIES: No results found.    LAST  WOUND CULTURE:   Lab Results   Component Value Date/Time    CULTRSULT  06/10/2025 09:54 PM     Mixed enteric ade ,000 cfu/mL  3 or more organisms isolated, culture of doubtful  significance, please recollect.        PATHOLOGY  2/17/2023-bone fragment extracted from left lower extremity wound\\  FINAL DIAGNOSIS:     A. Left leg bone fragment at base of chronic wound:          Extensively degenerated fibrocartilaginous tissue with a rim of           fibrinopurulent debris          Correlate with culture findings            Comment: While no residual intact bone is identified, these           findings are suggestive of adjacent osteomyelitis.      ASSESSMENT AND PLAN:     1. Sacral decubitus ulcer, stage IV ([facility])  Comments: Ulcer first noted in early April 2022 as small open area which " quickly enlarged.  This ulcer is present distal from previous sacral ulcer which healed after surgery in January.  Patient has history of flap reconstruction x2 to this area.    6/25/2025: Remains resolved.  - Continue to protect area with pressure relieving sacral foam dressing.  -Patient does spend a lot of time up in his wheelchair, and wishes to continue to do so for his quality of life.  He lives independently, drives, and is involved with family activities.    -His has a new cushion in his wheelchair.  Has yet to pick out a new wheelchair  - Known OM that was previously treated. CT scan done during recent hospitalization did not show OM of sacrum, however OM of the ischium noted.  -Patient is very well versed in pressure relief strategies  -Monitor site each clinic visit    Wound care: silicone adhesive foam dressing    2. Pressure injury of right ischium, stage 4 ([facility])  Comments: Abscess and OM found on CT during hospitalization in December 2023.  Patient underwent I&D with VAC placement.  IV antibiotics through 1/22/2024.    6/25/2025: Wound appears stable.  - Excisional debridement of nonviable tissue from wound in clinic today, medically necessary to promote wound healing  - VAC discontinued previously.  -Patient to return to clinic weekly for assessment, debridement, and dressing change.    -Home health to change dressing 2 times per week in between clinic visits.    -Patient is very well versed on pressure relief measures, has adequate surface on bed, alternating low air loss.  Wound care: Hydrofiber silver, superabsorbent foam, Silicone foam    3. Pressure injury of left ischium, stage 4 ([facility])    6/25/2025: Wound larger and appears deeper. Continues to have heavy drainage.  - Excisional debridement of wound in clinic today, medically necessary to promote wound healing.  - Patient to return to clinic weekly for assessment, debridement, and dressing change  -VAC has been discontinued  -Home health to  change dressing 2 times per week in between clinic visits.    -Patient has new cushion in his wheelchair, see above  -Patient is very well versed on pressure relief measures, has adequate surface on bed, alternating low air loss.  - Will place referral to Dr. Baez with [facility]    Wound care: Hydrofiber silver, superabsorbent foam, Silicone foam    4. Other acute osteomyelitis, other site ([facility])    6/25/2025: Bone fragments removed during clinic visit in June were sent for pathology, polymicrobial, most concerning was an MDR ESBL Proteus.   -Patient completed IV antibiotics.  No further antibiotics at this time per ID  -Patient understands he has a very low threshold for infection/sepsis.  He understands he is to go to the emergency room immediately if he begins to experience fevers, chills, nausea or vomiting, or if he notices radiating erythema or purulent drainage from his wounds.    5. Quadriplegia, C5-C7 incomplete ([facility])  Comments: Complicating factor.  Impaired mobility and sensation  -Patient is still spending 7-8 hours/day up in his wheelchair and knows to reposition frequently.  Wears heel float boots bilaterally at all times  - Patient has new custom wheelchair seat. He had refitting and appears he was not sitting back in chair enough which was causing undue pressure to IT. He is more aware of the correct positioning in chair.    Please note that this note may have been created using voice recognition software. I have worked with technical experts from [facility] to optimize the interface.  I have made every reasonable attempt to correct obvious errors, but there may be errors of grammar and possibly content that I did not discover before finalizing the note.

## 2025-06-25 NOTE — PATIENT INSTRUCTIONS
"The following information is a summary of the education provided in the clinic today. This is not an exhaustive list of the education provided during your appointment.       DRESSING CHANGES    Keep your wound dressing clean, dry, and intact. Change your dressing every 2 days AND/OR if the dressing becomes, soiled, leaks, gets wet, or falls off.      You may not shower with the dressing(s) off. Please do not take baths, or swim in the ocean, lakes, rivers, pools, or hot tubs.      Wounds do not need to \"air out\" or \"breathe\". Gently dry your wound before placing a new dressing.     After you get out of the shower, wash the wound a second time (with soap and water, wound cleanser, or saline). Gently dry the wound before you place a new dressing.       If you need to change your dressings at home, you should wash your wound. Use normal saline, wound cleanser, hypochlorous acid, or unscented soap and water. Do not use hydrogen peroxide or rubbing alcohol to clean your wounds. Hydrogen peroxide and rubbing alcohol will damage new cells and tissue. Do not use betadine or iodine unless told to by your wound care team.    Do not soak your wounds in epsom salt baths. This can worsen your wound(s) or delay wound healing. It can also lead to infection or maceration (tissue is too wet).     If you do not have home health, the clinic will give you with leftover supplies from your appointment. We do not give out extra dressing supplies. We will order you supplies through a [employer] company. Your insurance may or may not pay for all these supplies. The company will reach out to you if insurance does not cover supplies. These supplies will be sent to your home within a few days. If you do have home health, they will provide wound care supplies.     The dressings we use may change as your wound changes.       GENERAL HEALTH ADVICE   -Nutrition is important for wound healing. Unless told otherwise by your doctor, eat more protein and " consider supplementing with a multi-vitamin, zinc, and vitamin C.         OFFLOADING  -Offloading is important in helping treat pressure injuries. If you have a pressure injury, it's important to keep weight off of it to help wound healing. Offloading cushions help redistribute pressure. You should also change positions frequently, especially when sitting in a chair. You should reposition at least every 20-30 minutes. While some dressings help reduce pressure, but offloading cushions and regular repositioning are also necessary.   -Offloading cushions help relieve pressure from some wounds. You were given a handout today that explains the different types of cushions and where to buy them.       CLINIC INFORMATION  The clinic's hours are Monday-Friday, 7:30 AM to 5:00 PM. We are closed most holidays and on weekends. If you leave us a message, please allow 24 hours for someone to return your call. If you have concerns or are having a medical emergency, call 911 or go to the hospital emergency room.     You might not see the same nurse or provider every visit.     If you notice any large changes in your wound(s), or signs of infection (redness, swelling, localized heat, increased pain, fever > 101 F, chills, nausea/vomiting) or have any questions about your home care instructions, please call the wound center at [phone]. If it's after hours, contact your primary care physician or go to the hospital emergency room. If you are admitted to any hospital, you will need a new referral to come back to the wound clinic. Any wound care appointments that you already have may be cancelled.    If you are 5 or more minutes late for an appointment, we reserve the right to cancel and reschedule that appointment. For example, if your appointment is at 1:00 PM, and you arrive at 1:06 PM, you are more than five minutes late and might not be seen. If you are consistently late or not coming to your appointments (typically 3 late  cancellations and/or no shows), we reserve the right to cancel your future appointments or discharge you from the clinic. It is then your responsibility to obtain a new referral if wound care is still needed.

## 2025-06-26 NOTE — PROGRESS NOTES
Referral for consultation faxed to [facility] at [phone] and scanned into media (Phone number- [phone]).

## 2025-07-02 ENCOUNTER — OFFICE VISIT (OUTPATIENT)
Dept: WOUND CARE | Facility: MEDICAL CENTER | Age: 75
End: 2025-07-02
Payer: MEDICARE

## 2025-07-02 DIAGNOSIS — L89.314 PRESSURE INJURY OF RIGHT ISCHIUM, STAGE 4 (HCC): ICD-10-CM

## 2025-07-02 DIAGNOSIS — G82.54 QUADRIPLEGIA, C5-C7, INCOMPLETE (HCC): ICD-10-CM

## 2025-07-02 DIAGNOSIS — M86.18 OTHER ACUTE OSTEOMYELITIS, OTHER SITE (HCC): ICD-10-CM

## 2025-07-02 DIAGNOSIS — L89.324 PRESSURE INJURY OF LEFT ISCHIUM, STAGE 4 (HCC): Primary | ICD-10-CM

## 2025-07-02 PROCEDURE — 11043 DBRDMT MUSC&/FSCA 1ST 20/<: CPT

## 2025-07-02 PROCEDURE — 11043 DBRDMT MUSC&/FSCA 1ST 20/<: CPT | Performed by: NURSE PRACTITIONER

## 2025-07-02 PROCEDURE — 99214 OFFICE O/P EST MOD 30 MIN: CPT

## 2025-07-02 PROCEDURE — 99213 OFFICE O/P EST LOW 20 MIN: CPT | Mod: 25 | Performed by: NURSE PRACTITIONER

## 2025-07-03 ENCOUNTER — TELEPHONE (OUTPATIENT)
Dept: WOUND CARE | Facility: MEDICAL CENTER | Age: 75
End: 2025-07-03
Payer: MEDICARE

## 2025-07-03 NOTE — PATIENT INSTRUCTIONS
"The following information is a summary of the education provided in the clinic today. This is not an exhaustive list of the education provided during your appointment.       DRESSING CHANGES    Keep your wound dressing clean, dry, and intact. Change your dressing every 2 days AND/OR if the dressing becomes soiled, leaks, gets wet, or falls off.      You may not shower with the dressing(s) off. Please do not take baths, or swim in the ocean, lakes, rivers, pools, or hot tubs.      Wounds do not need to \"air out\" or \"breathe\". Gently dry your wound before placing a new dressing.     After you get out of the shower, wash the wound a second time (with soap and water, wound cleanser, or saline). Gently dry the wound before you place a new dressing.       If you need to change your dressings at home, you should wash your wound. Use normal saline, wound cleanser, hypochlorous acid, or unscented soap and water. Do not use hydrogen peroxide or rubbing alcohol to clean your wounds. Hydrogen peroxide and rubbing alcohol will damage new cells and tissue. Do not use betadine or iodine unless told to by your wound care team.    Do not soak your wounds in epsom salt baths. This can worsen your wound(s) or delay wound healing. It can also lead to infection or maceration (tissue is too wet).     If you do not have home health, the clinic will give you with leftover supplies from your appointment. We do not give out extra dressing supplies. We will order you supplies through a [employer] company. Your insurance may or may not pay for all these supplies. The company will reach out to you if insurance does not cover supplies. These supplies will be sent to your home within a few days. If you do have home health, they will provide wound care supplies.     The dressings we use may change as your wound changes.     GENERAL HEALTH ADVICE   -Nutrition is important for wound healing. Unless told otherwise by your doctor, eat more protein and " consider supplementing with a multi-vitamin, zinc, and vitamin C.       OFFLOADING  -Offloading is important in helping treat pressure injuries. If you have a pressure injury, it's important to keep weight off of it to help wound healing. Offloading cushions help redistribute pressure. You should also change positions frequently, especially when sitting in a chair. You should reposition at least every 20-30 minutes. While some dressings help reduce pressure, but offloading cushions and regular repositioning are also necessary.     CLINIC INFORMATION  The clinic's hours are Monday-Friday, 7:30 AM to 5:00 PM. We are closed most holidays and on weekends. If you leave us a message, please allow 24 hours for someone to return your call. If you have concerns or are having a medical emergency, call 911 or go to the hospital emergency room.     You might not see the same nurse or provider every visit.     If you notice any large changes in your wound(s), or signs of infection (redness, swelling, localized heat, increased pain, fever > 101 F, chills, nausea/vomiting) or have any questions about your home care instructions, please call the wound center at [phone]. If it's after hours, contact your primary care physician or go to the hospital emergency room. If you are admitted to any hospital, you will need a new referral to come back to the wound clinic. Any wound care appointments that you already have may be cancelled.    If you are 5 or more minutes late for an appointment, we reserve the right to cancel and reschedule that appointment. For example, if your appointment is at 1:00 PM, and you arrive at 1:06 PM, you are more than five minutes late and might not be seen. If you are consistently late or not coming to your appointments (typically 3 late cancellations and/or no shows), we reserve the right to cancel your future appointments or discharge you from the clinic. It is then your responsibility to obtain a new  referral if wound care is still needed.

## 2025-07-03 NOTE — TELEPHONE ENCOUNTER
Telephone call from [facility] they are only able to accommodate twice weekly visits and single layer of silver alginate in wound bed with plain alginate , super absorbant dressing and sacral covering dressings. Mae asked if wound vac has been considered again to manage drainage or a higher level of care. Updated Abby DE ANDA on discussion and limitations of home health.

## 2025-07-03 NOTE — PROGRESS NOTES
Provider Encounter- Pressure Injury        HISTORY OF PRESENT ILLNESS  Wound History:    START OF CARE IN CLINIC: 1/31/2024 (return to clinic after hospitalization)    REFERRING PROVIDER: Racquel York       WOUNDS-superior coccyx pressure injury, stage IV-resolved                                 Coccyx pressure injury, stage IV-resolved           Inferior coccyx pressure injury, stage IV resolved                                 Right ischial pressure injury, stage IV                                 Left heel pressure injury, recurring stage III-resolved                                 Left posterior lower leg/calf-stage III-resolved                                 Left medial lower leg surgical wound dehiscence-resolved, reopens frequently-resolved            Left ischial pressure injury, stage IV-first observed in clinic 5/1/2024          HISTORY: Patient with history of incomplete quadriplegia referred to [facility] for treatment of a stage IV pressure injury.  He has a history of previous pressure injuries to this area, and underwent muscle flaps in 2019, and then again in 2020.  He was seen in the wound clinic in November 2021 for an ulcer proximal from his current ulcer, and pressure injuries to his left posterior lower leg and left heel.  At that time, it was discovered that the patient had retained VAC foam embedded in the wound bed of the sacral wound.  Attempts were made to get him back to his plastic surgeon, though unsuccessful.  In January he underwent surgical removal of VAC sponge along with excisional debridement of his sacral wound by Dr. Chaves.  After the surgery, his wound went on to heal without incident.   In early April 2022, his home health nurse noted a new sacral ulcer, below the previous ulcer which quickly tripled in size over the following weeks.  The ulcer to his left medial lower leg had also deteriorated, with bone visible at the base..  He was hospitalized from 4/22 until 4/27/2022 and  underwent surgery with Dr. Raman on 4/26 for irrigation and debridement of multiple compartments of the left lower extremity, bone excision, and complex closure of chronic wound using biologic skin substitute.   His sacrococcygeal wound was not surgically addressed during this admission.  He was discharged back to his group home, with home health, and referral to outpatient wound clinic for his sacral wound.  He was instructed to follow-up with his surgeon for his lower leg wound.       Postoperatively, the left medial lower leg incision dehisced.  He was seen by his surgeon at [facility] on 5/11.  The surgeon opted to leave remaining sutures in place, and refer him to the wound clinic for treatment of this wound.   Treatment of this wound was initiated in clinic on 5/12.  During this visit was also noted that his heel DTI had resolved, but that he had a new pressure injury to his left posterior lower extremity.     A new pressure injury was noted to patient's right upper buttock/lower back on 5/20/2022.  Wound was linear in shape, skin discolored but intact.     Abrasion noted to left anterior lower leg.  First observed in clinic on 7/22/2022.  Patient states he bumped his leg into his food tray.     Small DTI noted to patient's left lateral lower leg on 7/29/2022.  Skin intact but discolored.     Large area of deep tissue injury noted to patient's left exterior lower leg.  Patient denied any trauma to this area.  Skin intact.  Wound documented.    1/27/2023: [place] [facility] [place]1/25[place]2023 with gross hematuria. He underwent RICHARD which showed watchman device was in place and he was taken off of Xarelto. While hospitalized wound team was consulted. He was referred back to [facility] and home health upon discharge.    Patient was hospitalized at [facility] for pyelonephritis from 2/26 until 3/2/2023, admitted for fever and general malaise.  He was admitted and initially started on linezolid and meropenem for suspected  UTI and history of multidrug-resistant organisms.  Urine cultures were negative. ID was consulted, recommended CT of chest and abdomen,which were negative for acute findings. However, he was treated with 5 days total course of antibiotics for suspected UTI, and symptoms completely resolved.  During this admission, the inpatient wound team was consulted for treatment of his sacral and lower leg wounds.  A wound culture was taken from his left heel pressure ulcer, negative.  Once stabilized, he was discharged home and referred back to [facility] to resume treatment of his wounds.    Patient was hospitalized at [facility] from 12/11 until 12/23/2023, admitted for fever.  Wound infection suspected.  CT scan of abdomen and pelvis for evaluation of sacral pressure injury showed gas tracking down to the bone consistent with osteomyelitis.  He underwent I&D of right ischial ulcer (documented as buttock) with Dr. Bansal, medial tract leading to an abscess was identified.  Cavity was opened allowing it to drain into the main wound bed.  Wound VAC was placed and managed by wound team during this admission.  A bone scan of patient's left foot was also done, initially concerning for osteomyelitis.  Orthopedic surgery was consulted and did not recommend surgical intervention. ID consulted also, recommended the patient to receive IV ertapenem 1 g every 24 hours plus IV daptomycin 8 mg/kg every 24 hours through 1/22/2024.   He was discharged to [facility] on 1/23 for IV antibiotics and wound care.  From the LTAC he was discharged home on 1/22 with home health and referral back to 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  Never used smokeless tobacco    Patient's problem list, allergies, and current medications reviewed and updated in Epic    Interval History:  Interval History thinned 7/29/2022.  Please see previous notes for complete interval history.   Interval History thinned 1/27/2023. Please see previous note for complete interval history.  Interval History thinned 3/3/2023.  Please see previous notes for complete interval history.    Interval History thinned 8/4/2023.  Please see previous notes for complete interval history.    Interval History thinned 1/31/2024.  Please see previous notes for complete interval history.    Interval History thinned 6/26/2024.  Please see previous notes for complete interval history.  Interval History thinned 4/29/2025.  Please see previous notes for complete interval history.         4/23/2025: Clinic visit with Steve Hodges MD. Patient spent more time in chair this week with Easter and wounds slightly larger though measurements are fairly positional. Less maceration and saturation of cover dressing with enluxtra, will continue.    4/30/2025: Clinic visit with Steve Hodges MD. Patient reports doing ok. Denies any acute issues. Wounds stable, continues to have heavy drainage. Will hold enluxtra today and change to superabsorbent pad to see if will manage drainage better.    5/7/2025: Clinic visit with Steve Hodges MD. Patient feels that drainage is adequately managed with current dressing. He denies any acute issues. Wounds stable.    5/14/2025: Clinic visit with Steve Hodges MD. Patient reports doing ok, denies any fevers or chills. Wounds stable. He feels that drainage has been adequately managed. He was seen in ED due to occlusion of catheter due to sediment. Recently completed treatment for UTI.    5/21/2025: Clinic visit with Steve Hodges MD. Patient reports doing well. He continues to have heavy drainage from wounds,  though no maceration. Right ischial wound larger. Left ischial wound smaller.    5/28/2025: Clinic visit with Steve Hodges MD. Patient reports doing ok. Appears that we are managing drainage better. He denies any complaints.    6/4/2025: Clinic visit with Steve Hodges MD. Patient reports doing well. Denies any acute issues. He reports that he continues to use tilt feature in wheelchair every 30 minutes. Wounds are stable.    6/11/2025: Clinic visit with Steve Hodges MD. Patient reports feeling ok, subjective fevers but otherwise asymptomatic. He was in ED yesterday with concerns of fever, they exchanged suprapubic catheter and obtained new urine culture which is in process. He received dose of fosfomycin and was discharged. Patient is concerned as temp in clinic today is borderline (100.4). He cannot get a hold of ID. I called ID APRN who will follow up with his calls.   Patient's right IT wound improved, left is stable / slightly larger though is positional. Wounds do not appear to be infected today. We discussed hyperbaric oxygen therapy, he is not interested in time commitment and not sure that there is hyperbaric oxygen clinic that would be able to accommodate quadriplegia.    6/19/2025: Clinic visit with Steve Hodges MD. Patient reports feeling normal, denies any acute issues. Urine culture was negative, not started on Abx. Declines any further fevers. Wounds stable, have not improved. Discussed wounds not improving, discussed that will likely need more intensive offloading. His quality of life is most important to him and not willing to offload to level to improve wounds.    6/25/2025: Clinic visit with Steve Hodges MD. Patient reports doing well. Denies any fevers or chills. Right wound smaller. Left wound larger, deeper, and continues to have heavy drainage. Discussed with patient possibility of referral to Dr. Baez to be evaluated for other potential treatment modalities and possibly surgery.  He is in agreement for referral and consultation.    7/2/2025 : Clinic visit with ADILSON Arthur, FNP-BC, CWDINESHN, CFTRACI.   Trach states he is feeling well.  Drainage remains heavy.  Wounds measure about the same.  He did receive a phone call from mena a nurse that works with Dr. Baez.  Turk refused visit from RN, was under the impression he would be seeing Dr. Baez for surgical consult.  Will have referring provider, Dr. Hodges clarify.   Wounds both measure a bit smaller, drainage remains heavy and with mild odor.    REVIEW OF SYSTEMS:   Unchanged from previous wound clinic assessment on 6/25/2025, except as noted in interval history above.      PHYSICAL EXAMINATION:   There were no vitals taken for this visit.  Physical Exam  Constitutional:       Appearance: He is obese.   Cardiovascular:      Rate and Rhythm: Normal rate.   Pulmonary:      Effort: Pulmonary effort is normal.   Abdominal:      Comments: Colostomy left lower quadrant   Genitourinary:     Comments: Suprapubic catheter to down drain   Skin:     Comments: Stage IV pressure injury to right ischium: Area measures smaller, however tends to be positional.  Heavy serosanguineous drainage, mild odor.  No periwound erythema or induration    Stage IV pressure injury of left ischium: Area measures smaller, however tends to be positional.  Dusky tissue at base of wound.  Heavy serosanguineous drainage, mild odor.  No periwound erythema or induration.    Stage IV pressure injury sacrum: Remains resolved    All other wounds remain healed, high risk for recurrence     Neurological:      Mental Status: He is alert and oriented to person, place, and time.   Psychiatric:         Mood and Affect: Mood normal.         WOUND ASSESSMENT  Wound 12/12/23 Pressure Injury Ischium Right (Active)   Wound Image    07/02/25 1530   Site Assessment Pink;Red;Yellow 07/02/25 1530   Periwound Assessment Maceration;Scar tissue 07/02/25 1530   Margins Attached edges 07/02/25 1530    Closure Secondary intention 04/02/25 1559   Drainage Amount Large 07/02/25 1530   Drainage Description Serosanguineous 07/02/25 1530   Treatments Cleansed;Site care;Provider debridement 07/02/25 1530   Offloading/DME Sacral offloading dressing;Offloading cushion 07/02/25 1530   ** Retired** Wound Cleansing Hypochlorus Acid 05/07/25 1400   Wound Cleansing Hypochlorus Acid 07/02/25 1530   Periwound Protectant Zinc paste 07/02/25 1530   Dressing Status Old drainage 05/21/25 1530   Dressing Changed Changed 05/28/25 1600   Dressing Cleansing/Solutions Not Applicable 07/02/25 1530   Dressing Options Hydrofiber Silver;Super Absorbent Pad;Sacral Dressing - Offloading 07/02/25 1530   Dressing Change/Treatment Frequency Monday, Wednesday, Friday, and As Needed 07/02/25 1530   WOUND NURSE ONLY - Pressure Injury Stage 4 07/02/25 1530   Non-staged Wound Description Full thickness 05/28/25 1600   Wound Length (cm) 5.4 cm 07/02/25 1530   Wound Width (cm) 2.3 cm 07/02/25 1530   Wound Depth (cm) 0.3 cm 07/02/25 1530   Wound Surface Area (cm^2) 9.75 cm^2 07/02/25 1530   Wound Volume (cm^3) 1.951 cm^3 07/02/25 1530   Post-Procedure Length (cm) 5.4 cm 07/02/25 1530   Post-Procedure Width (cm) 2.4 cm 07/02/25 1530   Post-Procedure Depth (cm) 0.4 cm 07/02/25 1530   Post-Procedure Surface Area (cm^2) 10.18 cm^2 07/02/25 1530   Post-Procedure Volume (cm^3) 2.714 cm^3 07/02/25 1530   Wound Healing % 97 07/02/25 1530   Tunneling (cm) 0 cm 07/02/25 1530   Tunneling Clock Position of Wound 6 05/14/25 1705   Undermining (cm) 0.3 cm 07/02/25 1530   Undermining of Wound, 1st Location From 12 o'clock;To 3 o'clock 07/02/25 1530   Undermining (cm) - 2nd location 0.5 cm 02/19/25 1500   Undermining of Wound, 2nd Location From 7 o'clock;From 12 o'clock;To 2 o'clock 02/19/25 1500   Wound Odor Mild 07/02/25 1530   Exposed Structures None 07/02/25 1530   Number of days: 568       Wound 05/01/24 Pressure Injury Ischium Left (Active)   Wound Image     07/02/25 1530   Site Assessment Red;Pink;Yellow;Necrotic/nonviable/devitalized 07/02/25 1530   Periwound Assessment Maceration;Scar tissue 07/02/25 1530   Margins Attached edges;Unattached edges 07/02/25 1530   Closure Secondary intention 04/02/25 1559   Drainage Amount Copious 07/02/25 1530   Drainage Description Serosanguineous 07/02/25 1530   Treatments Cleansed;Provider debridement;Site care;Silver nitrate 07/02/25 1530   Offloading/DME Sacral offloading dressing;Offloading cushion 07/02/25 1530   ** Retired** Wound Cleansing Hypochlorus Acid 05/07/25 1400   Wound Cleansing Hypochlorus Acid 07/02/25 1530   Periwound Protectant Zinc paste 07/02/25 1530   Dressing Changed Changed 05/07/25 1400   Dressing Cleansing/Solutions Not Applicable 07/02/25 1530   Dressing Options Surgiform;Hydrofiber Silver;Super Absorbent Pad;Sacral Dressing - Offloading 07/02/25 1530   Dressing Change/Treatment Frequency Monday, Wednesday, Friday, and As Needed 07/02/25 1530   Wound Team Following Weekly 05/07/25 1400   WOUND NURSE ONLY - Pressure Injury Stage 4 07/02/25 1530   Non-staged Wound Description Not applicable 05/07/25 1400   Wound Length (cm) 5.3 cm 07/02/25 1530   Wound Width (cm) 1.8 cm 07/02/25 1530   Wound Depth (cm) 3.1 cm 07/02/25 1530   Wound Surface Area (cm^2) 7.49 cm^2 07/02/25 1530   Wound Volume (cm^3) 15.485 cm^3 07/02/25 1530   Post-Procedure Length (cm) 5.3 cm 07/02/25 1530   Post-Procedure Width (cm) 1.8 cm 07/02/25 1530   Post-Procedure Depth (cm) 3.3 cm 07/02/25 1530   Post-Procedure Surface Area (cm^2) 7.49 cm^2 07/02/25 1530   Post-Procedure Volume (cm^3) 16.484 cm^3 07/02/25 1530   Wound Healing % -6939 07/02/25 1530   Tunneling (cm) 0 cm 07/02/25 1530   Undermining (cm) 0 cm 07/02/25 1530   Undermining of Wound, 1st Location From 10 o'clock;To 6 o'clock 05/21/25 1530   Undermining (cm) - 2nd location 0 cm 05/28/25 1600   Undermining of Wound, 2nd Location From 5 o'clock;To 7 o'clock 05/07/25 1400   Wound  "Odor Mild 07/02/25 1530   Exposed Structures Fascia;Bone 07/02/25 1530   Number of days: 427       PROCEDURE: Excisional debridement of right and left ischial wounds  -2% viscous lidocaine applied topically to wound bed for approximately 5 minutes prior to debridement  -Curette used to debride both wound beds.  Excisional debridement was performed to remove devitalized tissue until healthy, bleeding tissue was visualized.  Total area debrided was 17.67 cm², including into undermined areas of wounds.  Tissue debrided into the muscle / fascia layer.    -Bleeding controlled with manual pressure and silver nitrate.  -Wound care completed by wound RN, refer to flowsheet  -Patient tolerated the procedure well, without c/o pain or discomfort.    Pertinent Labs and Diagnostics:    Labs:     A1c: No results found for: \"HBA1C\"     [facility] results, 7/1/2022 (under media tab)    CRP 13    ESR 31      IMAGING:     X-ray left tib-fib ordered 2/16/2024 through [facility]    12/11/2023-CT of abdomen pelvis with contrast  IMPRESSION:   1.  Right ischial decubitus ulcer extending to bone with soft tissue gas tracking along the right perineum. Appearance suggesting osteomyelitis, consider component of necrotizing fasciitis as clinically appropriate.  2.  Small pericardial effusion  3.  Left adrenal nodule, density on prior noncontrast CT demonstrates adenoma.  4.  Hepatomegaly  5.  Enlarged prostate, workup and evaluation for causes of prostate enlargement recommended as clinically appropriate.  6.  Atherosclerosis and atherosclerotic coronary artery disease    12/15/2023-bone scan of left foot  IMPRESSION:     1.  Mild increased activity in the LEFT 1st and 3rd toes on blood pool and delayed images possibly indicating inflammation/infection.  2.  No significant blood flow asymmetry.          VASCULAR STUDIES: No results found.    LAST  WOUND CULTURE:   Lab Results   Component Value Date/Time    CULTRSULT  06/10/2025 09:54 " PM     Mixed enteric ade ,000 cfu/mL  3 or more organisms isolated, culture of doubtful  significance, please recollect.        PATHOLOGY  2/17/2023-bone fragment extracted from left lower extremity wound\\  FINAL DIAGNOSIS:     A. Left leg bone fragment at base of chronic wound:          Extensively degenerated fibrocartilaginous tissue with a rim of           fibrinopurulent debris          Correlate with culture findings            Comment: While no residual intact bone is identified, these           findings are suggestive of adjacent osteomyelitis.      ASSESSMENT AND PLAN:     1. Pressure injury of right ischium, stage 4 ([facility])  Comments: Abscess and OM found on CT during hospitalization in December 2023.  Patient underwent I&D with VAC placement.  IV antibiotics through 1/22/2024.    7/2/2025: Wound measures smaller, however measurements tend to be positional.  Drainage remains heavy  - Excisional debridement of nonviable tissue from wound in clinic today, medically necessary to promote wound healing  - VAC discontinued previously, patient had difficulty maintaining seal.  -Patient to return to clinic weekly for assessment, debridement, and dressing change.    -Home health to change dressing 2 times per week in between clinic visits.    -Patient is very well versed on pressure relief measures, has adequate surface on bed, alternating low air loss.  Wound care: Hydrofiber silver, superabsorbent foam, Silicone foam    2. Pressure injury of left ischium, stage 4 ([facility])    7/2/2025: Wound measures smaller, drainage remains heavy.  Measurements tend to be positional.  Dusky tissue at base of wound  - Excisional debridement of wound in clinic today, medically necessary to promote wound healing.  - Patient to return to clinic weekly for assessment, debridement, and dressing change  -VAC has been discontinued, see above  -Home health to change dressing 2 times per week in between clinic visits.    -Patient has  new cushion in his wheelchair, see above  -Patient is very well versed on pressure relief measures, has adequate surface on bed, alternating low air loss.  - Will place referral to Dr. Baez with [facility]    Wound care: Hydrofiber silver, superabsorbent foam, Silicone foam    3. Other acute osteomyelitis, other site ([facility])    7/2/2025: Bone fragments removed during clinic visit in June 2024 were sent for pathology, polymicrobial, most concerning was an MDR ESBL Proteus.   -Patient completed IV antibiotics.  No further antibiotics at this time per ID  -Patient understands he has a very low threshold for infection/sepsis.  He understands he is to go to the emergency room immediately if he begins to experience fevers, chills, nausea or vomiting, or if he notices radiating erythema or purulent drainage from his wounds.    4. Quadriplegia, C5-C7 incomplete ([facility])  Comments: Complicating factor.  Impaired mobility and sensation  -Patient is still spending 7-8 hours/day up in his wheelchair and knows to reposition frequently.  Wears heel float boots bilaterally at all times  - Patient has new custom wheelchair seat. He had refitting and appears he was not sitting back in chair enough which was causing undue pressure to IT. He is more aware of the correct positioning in chair.    My total time spent caring for the patient on the day of the encounter was 20 minutes.   This does not include time spent on separately billable procedures/tests.     Please note that this note may have been created using voice recognition software. I have worked with technical experts from [facility] to optimize the interface.  I have made every reasonable attempt to correct obvious errors, but there may be errors of grammar and possibly content that I did not discover before finalizing the note.

## 2025-07-04 ENCOUNTER — APPOINTMENT (OUTPATIENT)
Dept: RADIOLOGY | Facility: MEDICAL CENTER | Age: 75
End: 2025-07-04
Attending: EMERGENCY MEDICINE
Payer: MEDICARE

## 2025-07-04 ENCOUNTER — HOSPITAL ENCOUNTER (EMERGENCY)
Facility: MEDICAL CENTER | Age: 75
End: 2025-07-04
Attending: EMERGENCY MEDICINE
Payer: MEDICARE

## 2025-07-04 VITALS
HEART RATE: 56 BPM | BODY MASS INDEX: 30.78 KG/M2 | RESPIRATION RATE: 18 BRPM | SYSTOLIC BLOOD PRESSURE: 146 MMHG | DIASTOLIC BLOOD PRESSURE: 68 MMHG | WEIGHT: 215 LBS | TEMPERATURE: 97.7 F | HEIGHT: 70 IN | OXYGEN SATURATION: 96 %

## 2025-07-04 DIAGNOSIS — S92.404A CLOSED NONDISPLACED FRACTURE OF PHALANX OF RIGHT GREAT TOE, UNSPECIFIED PHALANX, INITIAL ENCOUNTER: ICD-10-CM

## 2025-07-04 DIAGNOSIS — S92.504A CLOSED NONDISPLACED FRACTURE OF PHALANX OF LESSER TOE OF RIGHT FOOT, UNSPECIFIED PHALANX, INITIAL ENCOUNTER: ICD-10-CM

## 2025-07-04 DIAGNOSIS — S91.311A LACERATION OF RIGHT FOOT, INITIAL ENCOUNTER: Primary | ICD-10-CM

## 2025-07-04 PROCEDURE — 99284 EMERGENCY DEPT VISIT MOD MDM: CPT

## 2025-07-04 PROCEDURE — 304217 HCHG IRRIGATION SYSTEM

## 2025-07-04 PROCEDURE — 90471 IMMUNIZATION ADMIN: CPT

## 2025-07-04 PROCEDURE — 90715 TDAP VACCINE 7 YRS/> IM: CPT | Performed by: EMERGENCY MEDICINE

## 2025-07-04 PROCEDURE — 304999 HCHG REPAIR-SIMPLE/INTERMED LEVEL 1

## 2025-07-04 PROCEDURE — 73630 X-RAY EXAM OF FOOT: CPT | Mod: RT

## 2025-07-04 PROCEDURE — 700111 HCHG RX REV CODE 636 W/ 250 OVERRIDE (IP): Performed by: EMERGENCY MEDICINE

## 2025-07-04 PROCEDURE — 303747 HCHG EXTRA SUTURE

## 2025-07-04 RX ADMIN — CLOSTRIDIUM TETANI TOXOID ANTIGEN (FORMALDEHYDE INACTIVATED), CORYNEBACTERIUM DIPHTHERIAE TOXOID ANTIGEN (FORMALDEHYDE INACTIVATED), BORDETELLA PERTUSSIS TOXOID ANTIGEN (GLUTARALDEHYDE INACTIVATED), BORDETELLA PERTUSSIS FILAMENTOUS HEMAGGLUTININ ANTIGEN (FORMALDEHYDE INACTIVATED), BORDETELLA PERTUSSIS PERTACTIN ANTIGEN, AND BORDETELLA PERTUSSIS FIMBRIAE 2/3 ANTIGEN 0.5 ML: 5; 2; 2.5; 5; 3; 5 INJECTION, SUSPENSION INTRAMUSCULAR at 18:53

## 2025-07-04 ASSESSMENT — FIBROSIS 4 INDEX: FIB4 SCORE: 2.53

## 2025-07-05 NOTE — ED TRIAGE NOTES
"/70   Pulse (!) 57   Temp 36.9 °C (98.5 °F) (Temporal)   Resp 16   Ht 1.778 m (5' 10\")   Wt 97.5 kg (215 lb)   SpO2 97%   BMI 30.85 kg/m²   Chief Complaint   Patient presents with    Wound Check     Here for evaluation for injury to R foot that occurred last night  injury to toes        Comes in by himself   injury to toes  they are covered w/ dressing PTA  "

## 2025-07-05 NOTE — ED PROVIDER NOTES
ED Provider Note    CHIEF COMPLAINT  Chief Complaint   Patient presents with    Wound Check     Here for evaluation for injury to R foot that occurred last night  injury to toes          EXTERNAL RECORDS REVIEWED  Outpatient Notes patient's is being seen by the wound center and was there for 7/2/2025 for a wound to his coccyx with a stage IV pressure ulcer right ischial pressure injury stage IV and left heel pressure injury recurring stage III    HPI/ROS  LIMITATION TO HISTORY   Select: : None  OUTSIDE HISTORIAN(S):  none    Osvaldo Pate is a 75 y.o. male who presents for evaluation of his right foot after hitting it against the door jam going into his room about 1/2-hour prior to arrival.  He did notice some bleeding but he is a partial quadriplegic and has no sensation or motion in his legs.  He is not diabetic.  He does not know when his last tetanus shot was.  He does not smoke.    PAST MEDICAL HISTORY   has a past medical history of A-fib ([facility]), Acute cystitis without hematuria (10/23/2021), Acute UTI (06/18/2023), Arrhythmia, Atrial flutter ([facility]), Blood clotting disorder ([facility]), Bowel habit changes, Clot hematuria (01/23/2023), GERD (gastroesophageal reflux disease), Hypertension, Hypokalemia (06/18/2023), Kidney stones, Metabolic acidosis (06/18/2023), Neurogenic bladder, Open wound (11/18/2022), Quadriplegia, C5-C7 complete ([facility]), Sepsis secondary to UTI ([facility]) (06/17/2023), SIRS (systemic inflammatory response syndrome) (Formerly [facility]) (12/12/2023), and Suprapubic catheter (Formerly [facility]).    SURGICAL HISTORY   has a past surgical history that includes percutaneouospinning lower extremity; colostomy; colostomy takedown; colostomy (N/A, 07/27/2019); ulcer debridement (N/A, 08/21/2019); flap closure (08/21/2019); hernia repair; wound irrigation & debridement (12/20/2020); cystoscopy,insert ureteral stent (Left, 12/16/2021); cysto/uretero/pyeloscopy, dx (Left, 12/16/2021); lasertripsy (Left, 12/16/2021); cystoscopy,insert  ureteral stent (Left, 2022); cysto/uretero/pyeloscopy, dx (Left, 2022); lasertripsy (N/A, 2022); incision and drainage orthopedic (2022); incision and drainage orthopedic (Left, 2022); bone biopsy (Left, 2022); wound irrigation & debridement (Left, 2022); wound closure (Left, 2022); orthopedic osteotomy (Left, 2022); orif, ankle; and wound irrigation & debridement (Right, 2023).    FAMILY HISTORY  Family History   Problem Relation Age of Onset    Heart Disease Father        SOCIAL HISTORY  Social History     Tobacco Use    Smoking status: Former     Current packs/day: 0.00     Average packs/day: 1 pack/day for 10.0 years (10.0 ttl pk-yrs)     Types: Cigarettes     Start date: 1967     Quit date: 1977     Years since quittin.5    Smokeless tobacco: Never   Vaping Use    Vaping status: Never Used   Substance and Sexual Activity    Alcohol use: Yes     Alcohol/week: 4.2 oz     Types: 7 Standard drinks or equivalent per week     Comment: 2/day    Drug use: No    Sexual activity: Not on file       CURRENT MEDICATIONS  Home Medications       Reviewed by Herminia Flowers R.N. (Registered Nurse) on 25 at 1817  Med List Status: Partial     Medication Last Dose Status   acetaminophen (TYLENOL) 500 MG Tab  Active   amLODIPine (NORVASC) 10 MG Tab  Active   amLODIPine (NORVASC) 2.5 MG Tab  Active   Ascorbic Acid (VITAMIN C) 1000 MG Tab  Active   aspirin EC 81 MG EC tablet  Active   baclofen (LIORESAL) 20 MG tablet  Active   Cholecalciferol (VITAMIN D3) 2000 UNIT Cap  Active   diclofenac sodium (VOLTAREN) 1 % Gel  Active   docusate sodium (COLACE) 100 MG Cap  Active   ferrous sulfate 325 (65 Fe) MG tablet  Active   losartan (COZAAR) 50 MG Tab  Active   Magnesium 400 MG Tab  Active   melatonin 5 mg Tab  Active   methenamine hip (HIPPREX) 1 GM Tab  Active   NON SPECIFIED  Active   NON SPECIFIED  Active   omeprazole (PRILOSEC) 20 MG delayed-release  "capsule  Active   polyethylene glycol 3350 (MIRALAX) 17 GM/SCOOP Powder  Active   Probiotic Product (PROBIOTIC PO)  Active   sennosides (SENOKOT) 8.6 MG Tab  Active   Simethicone (GAS-X PO)  Active   Sodium Hypochlorite (DAKINS 0.125%, 1/4 STRENGTH,) 0.125 % Solution  Active   solifenacin (VESICARE) 10 MG tablet  Active   Zinc 50 MG Tab  Active                  Audit from Redirected Encounters    **Home medications have not yet been reviewed for this encounter**         ALLERGIES  Allergies[1]    PHYSICAL EXAM  VITAL SIGNS: BP (!) 146/68   Pulse (!) 56   Temp 36.5 °C (97.7 °F) (Temporal)   Resp 18   Ht 1.778 m (5' 10\")   Wt 97.5 kg (215 lb)   SpO2 96%   BMI 30.85 kg/m²      Constitutional: No distress  Skin: Positive for a 5 cm laceration at the ball of his foot tracking in between his 1st and 2nd toe and a laceration tracking between the 2nd and 3rd toe wrapping around to the base of the third toe on the plantar surface he does have some contusion to the foot and toes  Musculoskeletal: The patient has no sensation to his right foot he has a lacerations as described above he has some bruising to the foot and the toes  Vascular: warm to touch good capillary refill   Neurologic: distally neurovascularly intact  Psychiatric: Affect normal      EKG/LABS  None  I have independently interpreted this EKG    RADIOLOGY/PROCEDURES   I have independently interpreted the diagnostic imaging associated with this visit and am waiting the final reading from the radiologist.   My preliminary interpretation is as follows: X-ray foot possible fractures of the 1st, 2nd and 3rd toes    Radiologist interpretation:  DX-FOOT-COMPLETE 3+ RIGHT   Final Result      Questionable lucency in the first, second and third proximal phalanges could relate to nondisplaced fracture or artifact.          COURSE & MEDICAL DECISION MAKING    ASSESSMENT, COURSE AND PLAN  Care Narrative:   Osvaldo Pate is a 75 y.o. male who presents for " evaluation of his right foot after hitting it against the door jam going into his room about 1/2-hour prior to arrival.  He did notice some bleeding but he is a partial quadriplegic and has no sensation or motion in his legs.  He is not diabetic.  He does not know when his last tetanus shot was.  He does not smoke.  On physical exam he has a laceration to the base of the third toe on the plantar surface and he also has a laceration on the ball of the foot tracking in between his 1st and 2nd toe bleeding is controlled he has diffuse bruising throughout his toes and on his foot.  He has no sensation and this is his baseline.              ADDITIONAL PROBLEMS MANAGED      DISPOSITION AND DISCUSSIONS    X-ray of the foot shows possible fractures of the 1st, 2nd and 3rd proximal phalanxes    Laceration Repair Procedure Note    Indication: Lacerations    Procedure: The patient was placed in the appropriate position and anesthesia around the lacerations were not performed at the patient's request as he is quadriplegic and has no sensation in his feet. The wound was minimally contaminated .The area was then cleansed with betadine and draped in a sterile fashion and irrigated with normal saline. The laceration between the patient's great toe and second toe laceration was closed with 4-0 Ethilon using interrupted sutures and 1 horizontal mattress suture. A second laceration between his second toe and third toe and wrapping around to the bottom of his third toe was closed with 4-0 Ethilon using interrupted sutures. The wound area was then dressed with bacitracin and a bandage.      Total repaired wound length: 12     Other Items: Suture count: 11    The patient tolerated the procedure well.    Complications: None   Patient might have fractures of his toes but he is nonweightbearing and does have severe osteopenia.  He already sees wound care and has an appointment for his other wounds on his sacral area on this coming  "Wednesday.  I have placed a referral to wound care for these wounds so they can care for them as well and take the sutures out in 7 to 10 days.  I advised his caregivers to watch for any signs of infection and return him if that happens otherwise that she keep the wounds clean and dry and apply antibiotic ointment.    I have discussed management of the patient with the following physicians and MARCO's:  none    Discussion of management with other [facility] [place] or appropriate source(s): None     Escalation of care considered, and ultimately not performed: none    Barriers to care at this time, including but not limited to: Patient is quadriplegic and cannot move his legs or feel them he is wheelchair-bound.     Decision tools and prescription drugs considered including, but not limited to: none.      The patient will return for new or worsening symptoms and is stable at the time of discharge.    The patient is referred to a primary physician for blood pressure management, diabetic screening, and for all other preventative health concerns.        DISPOSITION:  Patient will be discharged home in stable condition.    FOLLOW UP:  [facility]  [address]  [facility] [address]  [phone]    keep appointment Wednesday      OUTPATIENT MEDICATIONS:  New Prescriptions    No medications on file       FINAL DIAGNOSIS  1. Laceration of right foot, initial encounter    2. Closed nondisplaced fracture of phalanx of lesser toe of right foot, unspecified phalanx, initial encounter    3. Closed nondisplaced fracture of phalanx of right great toe, unspecified phalanx, initial encounter         Electronically signed by: Loli Jaimes M.D., 7/4/2025 6:34 PM           [1]   Allergies  Allergen Reactions    Sulfa Drugs Rash     Rash, developed this back in 2015 after being placed on \"sulfa antibiotic for my wound\". Antibiotic was stopped and rash went away. Patient states he had a sulfa antibiotic prior to that time back when " he was younger w/o a reaction.

## 2025-07-05 NOTE — ED NOTES
Discussed and educated pt on discharge instructions. Pt verbalized understanding of discharge instructions to follow up with PCP and wound care and to return to ER if condition worsens/ wounds show signs of infection.Pt used motorized wheel chair out of the ER.

## 2025-07-07 NOTE — PATIENT INSTRUCTIONS
7/7/2025    Assessment:       Diagnosis Orders   1. Type 2 diabetes mellitus with hyperglycemia, without long-term current use of insulin (HCC)          Patient stopped Jardiance due to chronic yeast infections.    PLAN:       Assessment & Plan  1. Type 2 Diabetes Mellitus: Well-controlled.  - A1c is currently at 6%, indicating good glycemic control. Average glucose level over the past two weeks was 155, corresponding to a GMI of 7%.  - Discussed the importance of incorporating walking into daily routine to help maintain and improve glucose levels.  - Continue Mounjaro 7.5 mg weekly. Prescription will be sent to pharmacy.    2. Hypertriglyceridemia.  - Triglyceride levels have decreased from 356 to 194.  - Advised to continue current lifestyle modifications, including increased physical activity and dietary changes.    3. Health Maintenance.  - Hemoglobin level is 14, indicating no anemia. Cholesterol level is 178, which is within the normal range.  - A metabolic panel will be ordered to assess kidney function, as the last test was conducted in 04/2024.    Follow-up  - A follow-up visit is scheduled in 6 months.     No orders of the defined types were placed in this encounter.    No orders of the defined types were placed in this encounter.    No follow-ups on file.  Subjective:     Chief Complaint   Patient presents with    Diabetes     Vitals:    07/07/25 1612   BP: 115/79   BP Site: Right Upper Arm   Patient Position: Sitting   BP Cuff Size: Medium Adult   Pulse: 84   Weight: 65.8 kg (145 lb)   Height: 1.676 m (5' 6\")       Wt Readings from Last 3 Encounters:   07/07/25 65.8 kg (145 lb)   06/10/25 64 kg (141 lb)   03/19/25 65.4 kg (144 lb 3.2 oz)     BP Readings from Last 3 Encounters:   07/07/25 115/79   06/10/25 118/74   03/19/25 120/72     Patient is a 57-year-old type 2 diabetic female presenting for diabetes follow up.  She is     Diabetes  She presents for her initial diabetic visit. She has type 2 diabetes  Should you experience any significant changes in your wound(s) such as infection (redness, swelling, localized heat, increased pain, fever >101 F, chills) or have any questions regarding your home care instructions, please contact the wound center [phone]. If after hours, contact your primary care physician or go the hospital emergency room.  Keep dressing clean and dry and cover while bathing. Only change dressing if over saturated, soiled, or its falling off.     Reviewed POC, importance of keeping the secondary dressing dry and intact, offloading, s/s of complications/infection, when to notify MD/go to ER.  Pt verbalized understanding to all.

## 2025-07-07 NOTE — Clinical Note
REFERRAL APPROVAL NOTICE         Sent on July 7, 2025                   Anjum Pate  [address] [place] [address]                   Dear Mr. Pate,    After a careful review of the medical information and benefit coverage, [facility] has processed your referral. See below for additional details.    If applicable, you must be actively enrolled with your insurance for coverage of the authorized service. If you have any questions regarding your coverage, please contact your insurance directly.    REFERRAL INFORMATION   Referral #:  [id number]  Referred-To Department    Referred-By Provider:  Wound Clinic    Loli Jaimes M.D.   [facility]      [address]  [address]  [place] [address]  [phone] [address]  [place] [id number] [phone]    Referral Start Date:  07/04/2025  Referral End Date:   07/04/2026             SCHEDULING  If you do not already have an appointment, please call [phone] to make an appointment.     MORE INFORMATION  If you do not already have a QA on Request account, sign up at: [email or web address]  You can access your medical information, make appointments, see lab results, billing information, and more.  If you have questions regarding this referral, please contact  the [facility] Referrals department at:             [phone]. Monday - Friday 8:00AM - 5:00PM.     Sincerely,    [facility]

## 2025-07-08 ENCOUNTER — OFFICE VISIT (OUTPATIENT)
Dept: CARDIOLOGY | Facility: MEDICAL CENTER | Age: 75
End: 2025-07-08
Attending: NURSE PRACTITIONER
Payer: MEDICARE

## 2025-07-08 VITALS
BODY MASS INDEX: 30.78 KG/M2 | SYSTOLIC BLOOD PRESSURE: 102 MMHG | DIASTOLIC BLOOD PRESSURE: 60 MMHG | WEIGHT: 215 LBS | HEART RATE: 58 BPM | OXYGEN SATURATION: 96 % | RESPIRATION RATE: 20 BRPM | HEIGHT: 70 IN

## 2025-07-08 DIAGNOSIS — I48.4 ATYPICAL ATRIAL FLUTTER (HCC): Primary | ICD-10-CM

## 2025-07-08 DIAGNOSIS — I48.0 PAROXYSMAL ATRIAL FIBRILLATION (HCC): ICD-10-CM

## 2025-07-08 DIAGNOSIS — I10 ESSENTIAL HYPERTENSION: Chronic | ICD-10-CM

## 2025-07-08 LAB — EKG IMPRESSION: NORMAL

## 2025-07-08 PROCEDURE — 3078F DIAST BP <80 MM HG: CPT | Performed by: NURSE PRACTITIONER

## 2025-07-08 PROCEDURE — 93005 ELECTROCARDIOGRAM TRACING: CPT | Mod: TC | Performed by: NURSE PRACTITIONER

## 2025-07-08 PROCEDURE — 3074F SYST BP LT 130 MM HG: CPT | Performed by: NURSE PRACTITIONER

## 2025-07-08 PROCEDURE — 99212 OFFICE O/P EST SF 10 MIN: CPT | Performed by: NURSE PRACTITIONER

## 2025-07-08 PROCEDURE — 93010 ELECTROCARDIOGRAM REPORT: CPT | Performed by: INTERNAL MEDICINE

## 2025-07-08 PROCEDURE — 99213 OFFICE O/P EST LOW 20 MIN: CPT | Performed by: NURSE PRACTITIONER

## 2025-07-08 ASSESSMENT — ENCOUNTER SYMPTOMS
PND: 0
SENSORY CHANGE: 0
ORTHOPNEA: 0
EYES NEGATIVE: 1
NEUROLOGICAL NEGATIVE: 1
SHORTNESS OF BREATH: 0
WHEEZING: 0
DIZZINESS: 0
CONSTITUTIONAL NEGATIVE: 1
RESPIRATORY NEGATIVE: 1
NERVOUS/ANXIOUS: 0
DEPRESSION: 0
PALPITATIONS: 0
HALLUCINATIONS: 0
CLAUDICATION: 0
BACK PAIN: 1
GASTROINTESTINAL NEGATIVE: 1
NAUSEA: 0
BRUISES/BLEEDS EASILY: 0

## 2025-07-08 ASSESSMENT — FIBROSIS 4 INDEX: FIB4 SCORE: 2.53

## 2025-07-08 NOTE — PROGRESS NOTES
"Electrophysiology Follow up  Note    DOS: 7/8/2025   [id number]  Osvaldo Pate    Chief complaint/Reason for consult: blood pressure    HPI: Pt is a 75 y.o. male who presents to the clinic today in follow up for blood pressure. Patient has a past medical history significant for but not limited to: hypertension, quadriplegia incomplete, paroxysmal atrial fibrillation/flutter, Watchman in situ. Patient is here for annual follow up. Blood pressure on home wrist cuff have had some variability to them. No chest pain or palpitations. Blood pressure in office quite good. Use home brachial cuff and will reassess by phone in one week for any changes needed to be made. EKG sinus.     Past Medical History:   Diagnosis Date    A-fib ([facility])     Acute cystitis without hematuria 10/23/2021    Acute UTI 06/18/2023    Arrhythmia     Atrial flutter ([facility])     Blood clotting disorder ([facility])     Patient is on Xarelto    Bowel habit changes     Colostomy    Clot hematuria 01/23/2023    GERD (gastroesophageal reflux disease)     Hypertension     Hypokalemia 06/18/2023    Kidney stones     Metabolic acidosis 06/18/2023    Neurogenic bladder     S/P cath    Open wound 11/18/2022    sacrum with wound vac, left ankle, [facility]    Quadriplegia, C5-C7 complete ([facility])     patient reports \" incomplete quad\"    Sepsis secondary to UTI ([facility]) 06/17/2023    SIRS (systemic inflammatory response syndrome) ([facility]) 12/12/2023    Suprapubic catheter ([facility])        Past Surgical History:   Procedure Laterality Date    WOUND IRRIGATION & DEBRIDEMENT Right 12/12/2023    Procedure: IRRIGATION AND DEBRIDEMENT, WOUND/BUTTOCKS;  Surgeon: Huan Bansal M.D.;  Location: SURGERY [facility];  Service: General    WOUND IRRIGATION & DEBRIDEMENT Left 04/26/2022    Procedure: IRRIGATION AND DEBRIDEMENT, WOUND - LEG;  Surgeon: Eric Raman M.D.;  Location: SURGERY [facility];  Service: Orthopedics    WOUND CLOSURE Left 04/26/2022    Procedure: " CLOSURE, WOUND;  Surgeon: Eric Raman M.D.;  Location: [facility];  Service: Orthopedics    ORTHOPEDIC OSTEOTOMY Left 04/26/2022    Procedure: OSTECTOMY;  Surgeon: Eric Raman M.D.;  Location: [facility];  Service: Orthopedics    INCISION AND DRAINAGE ORTHOPEDIC Left 01/27/2022    Procedure: INCISION AND DRAINAGE, WOUND, BY ORTHOPEDICS;  Surgeon: Erasmo Stewart M.D.;  Location: [facility];  Service: Orthopedics    BONE BIOPSY Left 01/27/2022    Procedure: BIOPSY, BONE;  Surgeon: Erasmo Stewart M.D.;  Location: [facility];  Service: Orthopedics    INCISION AND DRAINAGE ORTHOPEDIC  01/22/2022    Procedure: INCISION AND DRAINAGE, WOUND, BY ORTHOPEDICS;  Surgeon: Erasmo Stewart M.D.;  Location: [facility];  Service: Orthopedics    NM CYSTOSCOPY,INSERT URETERAL STENT Left 01/04/2022    Procedure: CYSTOSCOPY, WITH URETERAL STENT INSERTION;  Surgeon: Osvaldo Baltazar M.D.;  Location: [facility];  Service: Urology    NM CYSTO/URETERO/PYELOSCOPY, DX Left 01/04/2022    Procedure: URETEROSCOPY;  Surgeon: Osvaldo Baltazar M.D.;  Location: [facility];  Service: Urology    LASERTRIPSY N/A 01/04/2022    Procedure: LITHOTRIPSY, USING LASER;  Surgeon: Osvaldo Baltazar M.D.;  Location: [facility];  Service: Urology    NM CYSTOSCOPY,INSERT URETERAL STENT Left 12/16/2021    Procedure: CYSTOSCOPY, WITH URETERAL STENT INSERTION;  Surgeon: Aly Bowen M.D.;  Location: [facility];  Service: Urology    NM CYSTO/URETERO/PYELOSCOPY, DX Left 12/16/2021    Procedure: URETEROSCOPY;  Surgeon: Aly Bowen M.D.;  Location: [facility];  Service: Urology    LASERTRIPSY Left 12/16/2021    Procedure: LITHOTRIPSY, USING LASER;  Surgeon: Aly Bowen M.D.;  Location: SURGERY [facility];  Service: Urology    WOUND IRRIGATION & DEBRIDEMENT  12/20/2020    Procedure: IRRIGATION AND DEBRIDEMENT, WOUND SACRAL ULCER;   Surgeon: Matt Cummins M.D.;  Location: SURGERY [facility];  Service: Plastics    ULCER DEBRIDEMENT N/A 2019    Procedure: debridement of Sacral grade 4 ulcer - W/BONE BIOPSY, 3 liter wash out. bilateral sliding gluteal myocutaneous flap advancement;  Surgeon: Amadeo Moon M.D.;  Location: SURGERY [facility];  Service: Plastics    FLAP CLOSURE  2019    Procedure: CLOSURE, FLAP - MUSCLE;  Surgeon: Amadeo Moon M.D.;  Location: SURGERY [facility];  Service: Plastics    COLOSTOMY N/A 2019    Procedure: CREATION, COLOSTOMY -  placement;  Surgeon: Elías Hannah M.D.;  Location: SURGERY [facility];  Service: General    COLOSTOMY      COLOSTOMY TAKEDOWN      HERNIA REPAIR      ORIF, ANKLE      PERCUTANEOUOSPINNING LOWER EXTREMITY         Social History     Socioeconomic History    Marital status:      Spouse name: Not on file    Number of children: Not on file    Years of education: Not on file    Highest education level: Not on file   Occupational History    Not on file   Tobacco Use    Smoking status: Former     Current packs/day: 0.00     Average packs/day: 1 pack/day for 10.0 years (10.0 ttl pk-yrs)     Types: Cigarettes     Start date: 1967     Quit date: 1977     Years since quittin.5    Smokeless tobacco: Never   Vaping Use    Vaping status: Never Used   Substance and Sexual Activity    Alcohol use: Yes     Alcohol/week: 4.2 oz     Types: 7 Standard drinks or equivalent per week     Comment: 2/day    Drug use: No    Sexual activity: Not on file   Other Topics Concern    Not on file   Social History Narrative    Not on file     Social Drivers of Health     Financial Resource Strain: Not on file   Food Insecurity: No Food Insecurity (2025)    Hunger Vital Sign     Worried About Running Out of Food in the Last Year: Never true     Ran Out of Food in the Last Year: Never true   Transportation Needs: No Transportation Needs (2025)     "PRAPARE - Transportation     Lack of Transportation (Medical): No     Lack of Transportation (Non-Medical): No   Physical Activity: Not on file   Stress: Not on file   Social Connections: Not on file   Intimate Partner Violence: Not At Risk (1/9/2025)    Humiliation, Afraid, Rape, and Kick questionnaire     Fear of Current or Ex-Partner: No     Emotionally Abused: No     Physically Abused: No     Sexually Abused: No   Housing Stability: Low Risk  (1/9/2025)    Housing Stability Vital Sign     Unable to Pay for Housing in the Last Year: No     Number of Times Moved in the Last Year: 0     Homeless in the Last Year: No       Family History   Problem Relation Age of Onset    Heart Disease Father        Allergies   Allergen Reactions    Sulfa Drugs Rash     Rash, developed this back in 2015 after being placed on \"sulfa antibiotic for my wound\". Antibiotic was stopped and rash went away. Patient states he had a sulfa antibiotic prior to that time back when he was younger w/o a reaction.          Current Outpatient Medications   Medication Sig Dispense Refill    amLODIPine (NORVASC) 10 MG Tab Take 1 Tablet by mouth every day. 100 Tablet 3    baclofen (LIORESAL) 20 MG tablet TAKE 1 TABLET BY MOUTH FOUR TIMES DAILY 360 Tablet 3    methenamine hip (HIPPREX) 1 GM Tab Take 1 g by mouth 2 times a day.      omeprazole (PRILOSEC) 20 MG delayed-release capsule Take 20 mg by mouth 2 times a day.      losartan (COZAAR) 50 MG Tab Take 1 Tablet by mouth every day. 90 Tablet 3    polyethylene glycol 3350 (MIRALAX) 17 GM/SCOOP Powder Take 17 g by mouth every day. May also take 17 g 1 time a day as needed (constipation). Provide an extra dose of miralax daily as needed for constipation or hard stools. 850 g 11    Sodium Hypochlorite (DAKINS 0.125%, 1/4 STRENGTH,) 0.125 % Solution Dampen gauze with product and apply to wound bed, allow soak for 10 minutes prior to applying wound VAC. Use 3x weekly with VAC changes. 473 mL 3    solifenacin " (VESICARE) 10 MG tablet Take 10 mg by mouth every evening.      NON SPECIFIED Take 3 Capsules by mouth every day. Balance of Nature-Whole Produce FRUITS-patient to provide supply      NON SPECIFIED Take 3 Capsules by mouth every day. Balance of Nature-Whole Produce VEGGIES-patient to provide supply      docusate sodium (COLACE) 100 MG Cap Take 2 Capsules by mouth 2 times a day.      Simethicone (GAS-X PO) Take 1 Tablet by mouth as needed (For gas).      acetaminophen (TYLENOL) 500 MG Tab Take 1,000 mg by mouth every four hours as needed for Moderate Pain.      diclofenac sodium (VOLTAREN) 1 % Gel Apply 1 g topically 4 times a day. Apply to both elbows 350 g 11    aspirin EC 81 MG EC tablet Take 1 Tablet by mouth every day. 30 Tablet 6    Zinc 50 MG Tab Take 1 Tablet by mouth every morning.      Cholecalciferol (VITAMIN D3) 2000 UNIT Cap Take 1 Capsule by mouth every morning.      Magnesium 400 MG Tab Take 400 mg by mouth every morning.      Ascorbic Acid (VITAMIN C) 1000 MG Tab Take 1 Tablet by mouth every morning.      melatonin 5 mg Tab Take 10 mg by mouth at bedtime. Gummie      Probiotic Product (PROBIOTIC PO) Take 1 Capsule by mouth every morning.      sennosides (SENOKOT) 8.6 MG Tab Take 17.2 mg by mouth 2 times a day.      ferrous sulfate 325 (65 Fe) MG tablet Take 1 Tablet by mouth 2 times a day.      amLODIPine (NORVASC) 2.5 MG Tab Take 2.5 mg by mouth 1 time a day as needed. If  MMHG and Above (Patient not taking: Reported on 7/8/2025)       No current facility-administered medications for this visit.       Vitals:    07/08/25 0938   BP: 102/60   Pulse: (!) 58   Resp: 20   SpO2: 96%         Review of Systems   Constitutional: Negative.  Negative for malaise/fatigue.   HENT: Negative.     Eyes: Negative.    Respiratory: Negative.  Negative for shortness of breath and wheezing.    Cardiovascular:  Negative for chest pain, palpitations, orthopnea, claudication, leg swelling and PND.   Gastrointestinal:  Negative.  Negative for nausea.   Genitourinary: Negative.    Musculoskeletal:  Positive for back pain.   Skin: Negative.    Neurological: Negative.  Negative for dizziness and sensory change.   Endo/Heme/Allergies: Negative.  Does not bruise/bleed easily.   Psychiatric/Behavioral:  Negative for depression and hallucinations. The patient is not nervous/anxious.             EKG interpreted by me: Sinus RBBB    Physical Exam  Constitutional:       Appearance: Normal appearance.   HENT:      Head: Normocephalic.   Eyes:      Pupils: Pupils are equal, round, and reactive to light.   Neck:      Vascular: No JVD.   Cardiovascular:      Rate and Rhythm: Normal rate and regular rhythm.      Pulses: Normal pulses.      Heart sounds: Normal heart sounds.   Pulmonary:      Effort: Pulmonary effort is normal.      Breath sounds: Normal breath sounds.   Abdominal:      General: Abdomen is flat.      Palpations: Abdomen is soft.   Musculoskeletal:      Cervical back: Normal range of motion.      Right lower leg: No edema.      Left lower leg: No edema.      Comments: Patient incomplete quadriplegic in wheelchair    Skin:     General: Skin is warm and dry.   Neurological:      Mental Status: He is alert and oriented to person, place, and time.   Psychiatric:         Mood and Affect: Mood normal.         Behavior: Behavior normal.          Data:  Lipids:   Lab Results   Component Value Date/Time    CHOLSTRLTOT 118 05/01/2024 04:21 PM    TRIGLYCERIDE 83 05/01/2024 04:21 PM    HDL 45 05/01/2024 04:21 PM    LDL 56 05/01/2024 04:21 PM        BMP:  Lab Results   Component Value Date/Time    SODIUM 142 01/22/2024 0647    POTASSIUM 3.6 01/22/2024 0647    CHLORIDE 113 (H) 01/22/2024 0647    CO2 25.0 01/22/2024 0647    GLUCOSE 85 01/22/2024 0647    BUN 18.0 01/22/2024 0647    CREATININE 0.40 (L) 01/22/2024 0647    CALCIUM 8.0 (L) 01/22/2024 0647    ANION 4.0 01/22/2024 0647       GFR:  Lab Results   Component Value Date/Time    IFAFRICA >60  "01/25/2022 0459    IFNOTAFR >60 01/25/2022 0459        TSH:   Lab Results   Component Value Date/Time    TSHULTRASEN 0.350 (L) 12/09/2019 1129       MAGNESIUM:  Lab Results   Component Value Date/Time    MAGNESIUM 2.2 12/17/2023 0235    MAGNESIUM 2.0 06/19/2023 0156    MAGNESIUM 2.2 03/02/2023 0054        THYROXINE (T4):   No results found for: \"FREEDIR\"     CBC:   Lab Results   Component Value Date/Time    WBC 10.6 06/10/2025 08:26 PM    RBC 4.13 (L) 06/10/2025 08:26 PM    HEMOGLOBIN 13.8 (L) 06/10/2025 08:26 PM    HEMATOCRIT 42.6 06/10/2025 08:26 PM    .1 (H) 06/10/2025 08:26 PM    MCH 33.4 (H) 06/10/2025 08:26 PM    MCHC 32.4 06/10/2025 08:26 PM    RDW 55.4 (H) 06/10/2025 08:26 PM    PLATELETCT 168 06/10/2025 08:26 PM    MPV 8.8 (L) 06/10/2025 08:26 PM    NEUTSPOLYS 86.90 (H) 06/10/2025 08:26 PM    LYMPHOCYTES 4.90 (L) 06/10/2025 08:26 PM    MONOCYTES 7.30 06/10/2025 08:26 PM    EOSINOPHILS 0.10 06/10/2025 08:26 PM    BASOPHILS 0.20 06/10/2025 08:26 PM    IMMGRAN 0.60 06/10/2025 08:26 PM    NRBC 0.00 06/10/2025 08:26 PM    NEUTS 9.23 (H) 06/10/2025 08:26 PM    LYMPHS 0.52 (L) 06/10/2025 08:26 PM    LYMPHS 2 12/08/2019 05:35 PM    MONOS 0.77 06/10/2025 08:26 PM    EOS 0.01 06/10/2025 08:26 PM    BASO 0.02 06/10/2025 08:26 PM    IMMGRANAB 0.06 06/10/2025 08:26 PM    NRBCAB 0.00 06/10/2025 08:26 PM        CBC w/o DIFF  Lab Results   Component Value Date/Time    WBC 10.6 06/10/2025 08:26 PM    RBC 4.13 (L) 06/10/2025 08:26 PM    HEMOGLOBIN 13.8 (L) 06/10/2025 08:26 PM    .1 (H) 06/10/2025 08:26 PM    MCH 33.4 (H) 06/10/2025 08:26 PM    MCHC 32.4 06/10/2025 08:26 PM    RDW 55.4 (H) 06/10/2025 08:26 PM    MPV 8.8 (L) 06/10/2025 08:26 PM       LIVER:  Lab Results   Component Value Date/Time    ALKPHOSPHAT 92 06/10/2025 08:26 PM    ASTSGOT 15 06/10/2025 08:26 PM    ALTSGPT 7 06/10/2025 08:26 PM    TBILIRUBIN 0.8 06/10/2025 08:26 PM       BNP:  No results found for: \"BNPBTYPENAT\"    PT/INR:  Lab Results "   Component Value Date/Time    PROTHROMBTM 15.5 (H) 06/17/2023 09:15 PM    PROTHROMBTM 18.7 (H) 11/18/2022 11:58 AM    PROTHROMBTM 14.5 04/24/2022 04:27 AM    INR 1.25 (H) 06/17/2023 09:15 PM    INR 1.59 (H) 11/18/2022 11:58 AM    INR 1.16 (H) 04/24/2022 04:27 AM             Impression/Plan:  Essential hypertension   - stable in office   - home wrist cuff may not be accurate    - switch to brachial cuff    - RN phone check in one week   -     Paroxysmal atrial fibrillation (HCC)   - Watchman in situ   - no known symptomatic recurrence                Huan Cook AGACNP-EP  Cardiac Electrophysiology

## 2025-07-08 NOTE — ASSESSMENT & PLAN NOTE
- stable in office   - home wrist cuff may not be accurate    - switch to brachial cuff    - RN phone check in one week   -

## 2025-07-09 ENCOUNTER — OFFICE VISIT (OUTPATIENT)
Dept: WOUND CARE | Facility: MEDICAL CENTER | Age: 75
End: 2025-07-09
Payer: MEDICARE

## 2025-07-09 DIAGNOSIS — T14.8XXA WOUND INFECTION: Primary | ICD-10-CM

## 2025-07-09 DIAGNOSIS — M86.18 OTHER ACUTE OSTEOMYELITIS, OTHER SITE (HCC): ICD-10-CM

## 2025-07-09 DIAGNOSIS — L89.324 PRESSURE INJURY OF LEFT ISCHIUM, STAGE 4 (HCC): ICD-10-CM

## 2025-07-09 DIAGNOSIS — L89.314 PRESSURE INJURY OF RIGHT ISCHIUM, STAGE 4 (HCC): ICD-10-CM

## 2025-07-09 DIAGNOSIS — G82.54 QUADRIPLEGIA, C5-C7, INCOMPLETE (HCC): ICD-10-CM

## 2025-07-09 DIAGNOSIS — L08.9 WOUND INFECTION: Primary | ICD-10-CM

## 2025-07-09 LAB
GRAM STN SPEC: NORMAL
SIGNIFICANT IND 70042: NORMAL
SITE SITE: NORMAL
SOURCE SOURCE: NORMAL

## 2025-07-09 PROCEDURE — 99214 OFFICE O/P EST MOD 30 MIN: CPT

## 2025-07-09 PROCEDURE — 99213 OFFICE O/P EST LOW 20 MIN: CPT | Mod: 25 | Performed by: NURSE PRACTITIONER

## 2025-07-09 PROCEDURE — 11043 DBRDMT MUSC&/FSCA 1ST 20/<: CPT | Performed by: NURSE PRACTITIONER

## 2025-07-09 PROCEDURE — 11046 DBRDMT MUSC&/FSCA EA ADDL: CPT | Performed by: NURSE PRACTITIONER

## 2025-07-09 PROCEDURE — 11042 DBRDMT SUBQ TIS 1ST 20SQCM/<: CPT

## 2025-07-09 PROCEDURE — 87077 CULTURE AEROBIC IDENTIFY: CPT | Mod: 91

## 2025-07-09 PROCEDURE — 87205 SMEAR GRAM STAIN: CPT

## 2025-07-09 PROCEDURE — 11046 DBRDMT MUSC&/FSCA EA ADDL: CPT

## 2025-07-09 PROCEDURE — 11043 DBRDMT MUSC&/FSCA 1ST 20/<: CPT

## 2025-07-09 PROCEDURE — 87070 CULTURE OTHR SPECIMN AEROBIC: CPT

## 2025-07-09 NOTE — PATIENT INSTRUCTIONS
Keep dressings clean and dry. Change dressings if they become over saturated, soiled or fall off. Otherwise, your home health nurse will change your dressings between wound clinic visits.    If you need to change your dressings at home: Wash your wound with normal saline, wound cleanser, or unscented soap and water prior to applying your new dressings. Please avoid cleansing with hydrogen peroxide or rubbing alcohol. Hydrogen peroxide and rubbing alcohol are toxic to new tissue and skin cells.    Should you experience any significant changes in your wounds, such as signs of infection (increasing redness, swelling, localized heat, increased pain, fever > 101 F, chills) or have any questions regarding your home care instructions, please contact the wound center at [phone]. If after hours, contact your primary care physician or go to the hospital emergency room.     If you are admitted to any hospital, you will need a new referral to come back to the wound clinic and any scheduled appointments that you already have, may be cancelled.     If you are 5 or more minutes late for an appointment, we reserve the right to cancel and reschedule that appointment. Additionally, if you are habitually late or not showing (3 late cancellations and/or no shows), we reserve the right to cancel your remaining appointments and it will be your responsibility to obtain a new referral if services are still needed.         LM on daughter's voice mail-TCM switched to virtual at phone # she gave me today and administer insulin as usual and monitor blood sugars till tomorrow

## 2025-07-09 NOTE — PROGRESS NOTES
7/9/2025:  Wound culture collected from the left ischium wound and sent to lab via pneumatic tube system.  [facility]

## 2025-07-15 ENCOUNTER — TELEPHONE (OUTPATIENT)
Dept: CARDIOLOGY | Facility: MEDICAL CENTER | Age: 75
End: 2025-07-15
Payer: MEDICARE

## 2025-07-15 NOTE — TELEPHONE ENCOUNTER
----- Message from Nurse Practitioner AKTE Mathew sent at 7/8/2025 10:23 AM PDT -----  Regarding: BP check  Call in one week

## 2025-07-15 NOTE — TELEPHONE ENCOUNTER
Phone Number Called: [phone]     Call outcome: Spoke to patient regarding message below.    Message: Called to discuss patient BP.  Patient states he has been feeling good. Only needed to take his BP meds three times this week. Using his arm cuff.    7/13: PM /85  7/14: AM /83 PM /44  7/15: AM /102    Patient stated he took his meds this morning but has not rechecked his BP.    MT- Please review and advise, patient only taking amlodipine and Losartan if BP > 130/80. Thank you.

## 2025-07-16 ENCOUNTER — OFFICE VISIT (OUTPATIENT)
Dept: WOUND CARE | Facility: MEDICAL CENTER | Age: 75
End: 2025-07-16
Payer: MEDICARE

## 2025-07-16 DIAGNOSIS — M86.18 OTHER ACUTE OSTEOMYELITIS, OTHER SITE (HCC): ICD-10-CM

## 2025-07-16 DIAGNOSIS — L89.314 PRESSURE INJURY OF RIGHT ISCHIUM, STAGE 4 (HCC): ICD-10-CM

## 2025-07-16 DIAGNOSIS — T14.8XXA WOUND INFECTION: ICD-10-CM

## 2025-07-16 DIAGNOSIS — G82.54 QUADRIPLEGIA, C5-C7, INCOMPLETE (HCC): ICD-10-CM

## 2025-07-16 DIAGNOSIS — L89.324 PRESSURE INJURY OF LEFT ISCHIUM, STAGE 4 (HCC): Primary | ICD-10-CM

## 2025-07-16 DIAGNOSIS — L08.9 WOUND INFECTION: ICD-10-CM

## 2025-07-16 DIAGNOSIS — S91.114D LACERATION OF LESSER TOE OF RIGHT FOOT WITHOUT FOREIGN BODY PRESENT OR DAMAGE TO NAIL, SUBSEQUENT ENCOUNTER: ICD-10-CM

## 2025-07-16 PROCEDURE — 99214 OFFICE O/P EST MOD 30 MIN: CPT

## 2025-07-16 PROCEDURE — 11043 DBRDMT MUSC&/FSCA 1ST 20/<: CPT

## 2025-07-16 PROCEDURE — 11043 DBRDMT MUSC&/FSCA 1ST 20/<: CPT | Performed by: NURSE PRACTITIONER

## 2025-07-16 PROCEDURE — 11046 DBRDMT MUSC&/FSCA EA ADDL: CPT

## 2025-07-16 PROCEDURE — 11046 DBRDMT MUSC&/FSCA EA ADDL: CPT | Performed by: NURSE PRACTITIONER

## 2025-07-16 PROCEDURE — 99213 OFFICE O/P EST LOW 20 MIN: CPT | Mod: 25 | Performed by: NURSE PRACTITIONER

## 2025-07-16 NOTE — PROGRESS NOTES
Provider Encounter- Pressure Injury        HISTORY OF PRESENT ILLNESS  Wound History:    START OF CARE IN CLINIC: 1/31/2024 (return to clinic after hospitalization)    REFERRING PROVIDER: Racquel York       WOUNDS-superior coccyx pressure injury, stage IV-resolved                                 Coccyx pressure injury, stage IV-resolved           Inferior coccyx pressure injury, stage IV resolved                                 Right ischial pressure injury, stage IV                                 Left heel pressure injury, recurring stage III-resolved                                 Left posterior lower leg/calf-stage III-resolved                                 Left medial lower leg surgical wound dehiscence-resolved, reopens frequently-resolved            Left ischial pressure injury, stage IV-first observed in clinic 5/1/2024              Right third toe, plantar base-full-thickness, dehiscence of laceration repair.  First observed in clinic 7/16/2025       HISTORY: Patient with history of incomplete quadriplegia referred to [facility] for treatment of a stage IV pressure injury.  He has a history of previous pressure injuries to this area, and underwent muscle flaps in 2019, and then again in 2020.  He was seen in the wound clinic in November 2021 for an ulcer proximal from his current ulcer, and pressure injuries to his left posterior lower leg and left heel.  At that time, it was discovered that the patient had retained VAC foam embedded in the wound bed of the sacral wound.  Attempts were made to get him back to his plastic surgeon, though unsuccessful.  In January he underwent surgical removal of VAC sponge along with excisional debridement of his sacral wound by Dr. Chaves.  After the surgery, his wound went on to heal without incident.   In early April 2022, his home health nurse noted a new sacral ulcer, below the previous ulcer which quickly tripled in size over the following weeks.  The ulcer to his left  medial lower leg had also deteriorated, with bone visible at the base..  He was hospitalized from 4/22 until 4/27/2022 and underwent surgery with Dr. Raman on 4/26 for irrigation and debridement of multiple compartments of the left lower extremity, bone excision, and complex closure of chronic wound using biologic skin substitute.   His sacrococcygeal wound was not surgically addressed during this admission.  He was discharged back to his group home, with home health, and referral to outpatient wound clinic for his sacral wound.  He was instructed to follow-up with his surgeon for his lower leg wound.       Postoperatively, the left medial lower leg incision dehisced.  He was seen by his surgeon at [facility] on 5/11.  The surgeon opted to leave remaining sutures in place, and refer him to the wound clinic for treatment of this wound.   Treatment of this wound was initiated in clinic on 5/12.  During this visit was also noted that his heel DTI had resolved, but that he had a new pressure injury to his left posterior lower extremity.     A new pressure injury was noted to patient's right upper buttock/lower back on 5/20/2022.  Wound was linear in shape, skin discolored but intact.     Abrasion noted to left anterior lower leg.  First observed in clinic on 7/22/2022.  Patient states he bumped his leg into his food tray.     Small DTI noted to patient's left lateral lower leg on 7/29/2022.  Skin intact but discolored.     Large area of deep tissue injury noted to patient's left exterior lower leg.  Patient denied any trauma to this area.  Skin intact.  Wound documented.    1/27/2023: Patient was admitted to [facility] from 1/23-1/25/2023 with gross hematuria. He underwent RICHARD which showed watchman device was in place and he was taken off of Xarelto. While hospitalized wound team was consulted. He was referred back to [facility] and home health upon discharge.    Patient was hospitalized at [facility] for pyelonephritis from 2/26 until  3/2/2023, admitted for fever and general malaise.  He was admitted and initially started on linezolid and meropenem for suspected UTI and history of multidrug-resistant organisms.  Urine cultures were negative. ID was consulted, recommended CT of chest and abdomen,which were negative for acute findings. However, he was treated with 5 days total course of antibiotics for suspected UTI, and symptoms completely resolved.  During this admission, the inpatient wound team was consulted for treatment of his sacral and lower leg wounds.  A wound culture was taken from his left heel pressure ulcer, negative.  Once stabilized, he was discharged home and referred back to [facility] to resume treatment of his wounds.    Patient was hospitalized at [facility] from 12/11 until 12/23/2023, admitted for fever.  Wound infection suspected.  CT scan of abdomen and pelvis for evaluation of sacral pressure injury showed gas tracking down to the bone consistent with osteomyelitis.  He underwent I&D of right ischial ulcer (documented as buttock) with Dr. Bansal, medial tract leading to an abscess was identified.  Cavity was opened allowing it to drain into the main wound bed.  Wound VAC was placed and managed by wound team during this admission.  A bone scan of patient's left foot was also done, initially concerning for osteomyelitis.  Orthopedic surgery was consulted and did not recommend surgical intervention. ID consulted also, recommended the patient to receive IV ertapenem 1 g every 24 hours plus IV daptomycin 8 mg/kg every 24 hours through 1/22/2024.   He was discharged to [facility] on 1/23 for IV antibiotics and wound care.  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  Never used smokeless tobacco    Patient's problem list, allergies, and current medications reviewed and updated in Epic    Interval History:  Interval History thinned 7/29/2022.  Please see previous notes for complete interval history.   Interval History thinned 1/27/2023. Please see previous note for complete interval history.  Interval History thinned 3/3/2023.  Please see previous notes for complete interval history.    Interval History thinned 8/4/2023.  Please see previous notes for complete interval history.    Interval History thinned 1/31/2024.  Please see previous notes for complete interval history.    Interval History thinned 6/26/2024.  Please see previous notes for complete interval history.  Interval History thinned 4/29/2025.  Please see previous notes for complete interval history.         4/23/2025: Clinic visit with Steve Hodges MD. Patient spent more time in chair this week with Easter and wounds slightly larger though measurements are fairly positional. Less maceration and saturation of cover dressing with enluxtra, will continue.    4/30/2025: Clinic visit with Steve Hodges MD. Patient reports doing ok. Denies any acute issues. Wounds stable, continues to have heavy drainage. Will hold enluxtra today and change to superabsorbent pad to see if will manage drainage better.    5/7/2025: Clinic visit with Steve Hodges MD. Patient feels that drainage is adequately managed with current dressing. He denies any acute issues. Wounds stable.    5/14/2025: Clinic visit with Steve Hodges MD. Patient reports doing ok, denies any fevers or chills. Wounds stable. He feels that drainage has been adequately managed. He was seen in ED due to occlusion of catheter due to sediment. Recently completed treatment  for UTI.    5/21/2025: Clinic visit with Steve Hodges MD. Patient reports doing well. He continues to have heavy drainage from wounds, though no maceration. Right ischial wound larger. Left ischial wound smaller.    5/28/2025: Clinic visit with Steve Hodges MD. Patient reports doing ok. Appears that we are managing drainage better. He denies any complaints.    6/4/2025: Clinic visit with Steve Hodges MD. Patient reports doing well. Denies any acute issues. He reports that he continues to use tilt feature in wheelchair every 30 minutes. Wounds are stable.    6/11/2025: Clinic visit with Steve Hodges MD. Patient reports feeling ok, subjective fevers but otherwise asymptomatic. He was in ED yesterday with concerns of fever, they exchanged suprapubic catheter and obtained new urine culture which is in process. He received dose of fosfomycin and was discharged. Patient is concerned as temp in clinic today is borderline (100.4). He cannot get a hold of ID. I called ID APRN who will follow up with his calls.   Patient's right IT wound improved, left is stable / slightly larger though is positional. Wounds do not appear to be infected today. We discussed hyperbaric oxygen therapy, he is not interested in time commitment and not sure that there is hyperbaric oxygen clinic that would be able to accommodate quadriplegia.    6/19/2025: Clinic visit with Steve Hodges MD. Patient reports feeling normal, denies any acute issues. Urine culture was negative, not started on Abx. Declines any further fevers. Wounds stable, have not improved. Discussed wounds not improving, discussed that will likely need more intensive offloading. His quality of life is most important to him and not willing to offload to level to improve wounds.    6/25/2025: Clinic visit with Steve Hodges MD. Patient reports doing well. Denies any fevers or chills. Right wound smaller. Left wound larger, deeper, and continues to have heavy drainage.  Discussed with patient possibility of referral to Dr. Baez to be evaluated for other potential treatment modalities and possibly surgery. He is in agreement for referral and consultation.    7/2/2025 : Clinic visit with ADILSON Arthur, HOLDEN, IMAN, LILIYA.   Anjum states he is feeling well.  Drainage remains heavy.  Wounds measure about the same.  He did receive a phone call from t a nurse that works with Dr. Baez.  Anjum refused visit from RN, was under the impression he would be seeing Dr. Baez for surgical consult.  Will have referring provider, Dr. Hodges clarify.   Wounds both measure a bit smaller, drainage remains heavy and with mild odor.    7/9/2025 : Clinic visit with ADILSON Arthur, HOLDEN, IMAN, LILIYA.   States he is feeling well.  He was seen by Dr. Baez, with mobile wound care service.  Dr. Baez is recommending biologic and wound VAC.  Per patient, Dr. Baez will be ordering VAC, he does not know which biologic he is proposing.  Patient states he is considering, though not completely sure what he wants to do.  He understands that if Dr. Baez takes over his wound care, we will have to discharge him from [facility].  I reassured him that we would not offended  if he were to choose to pursue treatment with multiple  wound care.  Our main concern is that his wounds heal.   Heavy drainage and odor persist.  Wound culture collected from left ischial wound after debridement.  Will follow results.    7/16/2025 : Clinic visit with ADILSON Arthur, HOLDEN, IMAN, LILIYA.   Patient continues to do well.  Drainage from wounds has tapered off somewhat, odor persists.  Wound culture results positive for strep G.  Rx for Augmentin x 14 days sent to patient's pharmacy.   Per patient, Dr. Baez has contacted him several times regarding starting VAC and biologic.  Apparently there is no plans for surgery.  Patient states he would rather continue his wound care with us.  He is willing to consider trying VAC again.  Order placed.   However, I did caution him that his insurance may decline as previously his insurance had stopped covering due to lack of wound progress.  Will also consider trying a biologic on this wound.  Would like to try and decrease area of wound with VAC first.   Sutures from toe lacerations removed in clinic today.  Partial dehiscence noted at base of third toe, topical therapy initiated      REVIEW OF SYSTEMS:   Unchanged from previous wound clinic assessment on 7/9/2025, except as noted in interval history above.      PHYSICAL EXAMINATION:   There were no vitals taken for this visit.  Physical Exam  Constitutional:       Appearance: He is obese.   Cardiovascular:      Rate and Rhythm: Normal rate.   Pulmonary:      Effort: Pulmonary effort is normal.   Abdominal:      Comments: Colostomy left lower quadrant   Genitourinary:     Comments: Suprapubic catheter to down drain   Skin:     Comments: Stage IV pressure injury to right ischium: Wound measures larger, however dimensions tend to be positional.  Probes to bone superior/medial.  Thin layer of slough to wound bed.  Moderate serosanguineous drainage, odiferous.  Periwound erythema or induration    Stage IV pressure injury of left ischium: Wound measures larger, however dimensions send to be positional.  Probes to bone moderate serosanguineous drainage with odor.  No periwound erythema or induration    Base of right third toe: Partial dehiscence of laceration noted with suture removal.  Full-thickness.  Moderate sanguinous drainage.  No periwound erythema or induration    All other wounds remain healed, high risk for recurrence     Neurological:      Mental Status: He is alert and oriented to person, place, and time.   Psychiatric:         Mood and Affect: Mood normal.         WOUND ASSESSMENT  Wound 12/12/23 Pressure Injury Ischium Right (Active)   Wound Image    07/16/25 1530   Site Assessment Pink;Red;Clean, non granulating tissue 07/16/25 1530   Periwound Assessment  Maceration;Scar tissue 07/16/25 1530   Margins Unattached edges 07/16/25 1530   Closure Secondary intention 04/02/25 1559   Drainage Amount Large 07/16/25 1530   Drainage Description Serosanguineous 07/16/25 1530   Treatments Cleansed;Provider debridement;Site care 07/16/25 1530   Offloading/DME Sacral offloading dressing;Offloading cushion 07/16/25 1530   ** Retired** Wound Cleansing Hypochlorus Acid 05/07/25 1400   Wound Cleansing Foam Cleanser/Washcloth;Hypochlorus Acid 07/16/25 1530   Periwound Protectant No-sting Skin Prep;TRIAD paste 07/16/25 1530   Dressing Status Old drainage 05/21/25 1530   Dressing Changed Changed 05/28/25 1600   Dressing Cleansing/Solutions Not Applicable 07/16/25 1530   Dressing Options Hydrofiber Silver Strip;Hydrofiber Silver;Super Absorbent Pad;Sacral Dressing - Offloading 07/16/25 1530   Dressing Change/Treatment Frequency Monday, Wednesday, Friday, and As Needed 07/16/25 1530   WOUND NURSE ONLY - Pressure Injury Stage 4 07/16/25 1530   Non-staged Wound Description Full thickness 05/28/25 1600   Wound Length (cm) 5.5 cm 07/16/25 1530   Wound Width (cm) 2.1 cm 07/16/25 1530   Wound Depth (cm) 0.5 cm 07/16/25 1530   Wound Surface Area (cm^2) 9.07 cm^2 07/16/25 1530   Wound Volume (cm^3) 3.024 cm^3 07/16/25 1530   Post-Procedure Length (cm) 5.5 cm 07/16/25 1530   Post-Procedure Width (cm) 2.1 cm 07/16/25 1530   Post-Procedure Depth (cm) 0.5 cm 07/16/25 1530   Post-Procedure Surface Area (cm^2) 9.07 cm^2 07/16/25 1530   Post-Procedure Volume (cm^3) 3.024 cm^3 07/16/25 1530   Wound Healing % 95 07/16/25 1530   Tunneling (cm) 0 cm 07/16/25 1530   Tunneling Clock Position of Wound 6 05/14/25 1705   Undermining (cm) 0.4 cm 07/16/25 1530   Undermining of Wound, 1st Location From 12 o'clock;To 3 o'clock 07/16/25 1530   Undermining (cm) - 2nd location 0.5 cm 02/19/25 1500   Undermining of Wound, 2nd Location From 7 o'clock;From 12 o'clock;To 2 o'clock 02/19/25 1500   Wound Odor Foul 07/16/25  1530   Exposed Structures None 07/16/25 1530   Number of days: 582       Wound 05/01/24 Pressure Injury Ischium Left (Active)   Wound Image    07/16/25 1530   Site Assessment Clean, non granulating tissue;Red;Yellow 07/16/25 1530   Periwound Assessment Maceration;Scar tissue 07/16/25 1530   Margins Unattached edges 07/16/25 1530   Closure Secondary intention 04/02/25 1559   Drainage Amount Copious 07/16/25 1530   Drainage Description Serosanguineous 07/16/25 1530   Treatments Cleansed;Provider debridement;Site care 07/16/25 1530   Offloading/DME Sacral offloading dressing;Offloading cushion 07/16/25 1530   ** Retired** Wound Cleansing Hypochlorus Acid 05/07/25 1400   Wound Cleansing Foam Cleanser/Washcloth;Hypochlorus Acid 07/16/25 1530   Periwound Protectant No-sting Skin Prep;TRIAD paste 07/16/25 1530   Dressing Changed Changed 05/07/25 1400   Dressing Cleansing/Solutions Not Applicable 07/16/25 1530   Dressing Options Hydrofiber Silver Strip;Hydrofiber Silver;Fenestrated;Super Absorbent Pad;Sacral Dressing - Offloading;Hypafix Tape 07/16/25 1530   Dressing Change/Treatment Frequency Monday, Wednesday, Friday, and As Needed 07/16/25 1530   Wound Team Following Weekly 05/07/25 1400   WOUND NURSE ONLY - Pressure Injury Stage 4 07/16/25 1530   Non-staged Wound Description Not applicable 05/07/25 1400   Wound Length (cm) 6.5 cm 07/16/25 1530   Wound Width (cm) 3.5 cm 07/16/25 1530   Wound Depth (cm) 3.7 cm 07/16/25 1530   Wound Surface Area (cm^2) 17.87 cm^2 07/16/25 1530   Wound Volume (cm^3) 44.074 cm^3 07/16/25 1530   Post-Procedure Length (cm) 6.7 cm 07/16/25 1530   Post-Procedure Width (cm) 3.5 cm 07/16/25 1530   Post-Procedure Depth (cm) 3.7 cm 07/16/25 1530   Post-Procedure Surface Area (cm^2) 18.42 cm^2 07/16/25 1530   Post-Procedure Volume (cm^3) 45.43 cm^3 07/16/25 1530   Wound Healing % -[id number] 07/16/25 1530   Tunneling (cm) 0 cm 07/16/25 1530   Undermining (cm) 2.5 cm 07/16/25 1530   Undermining of Wound,  1st Location From 12 o'clock;To 1 o'clock 07/16/25 1530   Undermining (cm) - 2nd location 0 cm 05/28/25 1600   Undermining of Wound, 2nd Location From 5 o'clock;To 7 o'clock 05/07/25 1400   Wound Odor Foul 07/16/25 1530   Exposed Structures Bone;Fascia 07/16/25 1530   Number of days: 441       Wound 07/16/25 Traumatic Laceration Toe, 3rd Plantar Right base of toe (Active)   Wound Image   07/16/25 1530   Site Assessment Red 07/16/25 1530   Periwound Assessment Intact 07/16/25 1530   Margins Unattached edges 07/16/25 1530   Closure Secondary intention 07/16/25 1530   Drainage Amount Large 07/16/25 1530   Drainage Description Sanguineous 07/16/25 1530   Treatments Cleansed;Site care 07/16/25 1530   Offloading/DME Heel float boot 07/16/25 1530   Wound Cleansing Foam Cleanser/Washcloth;Hypochlorus Acid 07/16/25 1530   Periwound Protectant No-sting Skin Prep 07/16/25 1530   Dressing Status Clean;Dry;Intact 07/16/25 1530   Dressing Changed New 07/16/25 1530   Dressing Cleansing/Solutions Not Applicable 07/16/25 1530   Dressing Options Hydrofiber Silver Strip;Nonadhesive Foam;Hypafix Tape 07/16/25 1530   Dressing Change/Treatment Frequency Every 72 hrs, and As Needed 07/16/25 1530   Wound Team Following Weekly 07/16/25 1530   Non-staged Wound Description Full thickness 07/16/25 1530   Wound Length (cm) 0.5 cm 07/16/25 1530   Wound Width (cm) 1.2 cm 07/16/25 1530   Wound Depth (cm) 0.5 cm 07/16/25 1530   Wound Surface Area (cm^2) 0.47 cm^2 07/16/25 1530   Wound Volume (cm^3) 0.157 cm^3 07/16/25 1530   Tunneling (cm) 0 cm 07/16/25 1530   Undermining (cm) 0 cm 07/16/25 1530   Wound Odor None 07/16/25 1530   Exposed Structures None 07/16/25 1530   Number of days: 0       PROCEDURE: Excisional debridement of right and left ischial wounds  -2% viscous lidocaine applied topically to wound bed for approximately 5 minutes prior to debridement  -Curette used to debride both wound beds.  Excisional debridement was performed to remove  "devitalized tissue until healthy, bleeding tissue was visualized.  Total area debrided was 27.49 cm², including into undermined areas of wounds.  Tissue debrided into the muscle / fascia layer.    -Bleeding controlled with manual pressure and silver nitrate.  -Wound care completed by wound RN, refer to flowsheet  -Patient tolerated the procedure well, without c/o pain or discomfort.    PROCEDURE: Suture removal from between toes 3 and 4 and 2 and 3  - Forceps, scissors, and scalpel used to remove sutures  - Partial dehiscence of laceration noted at base of third toe  - Bleeding controlled with manual pressure  - Wound care completed by RN, refer to flowsheet  - Patient tolerated procedure without any difficulty    Pertinent Labs and Diagnostics:    Labs:     A1c: No results found for: \"HBA1C\"     [facility] results, 7/1/2022 (under media tab)    CRP 13    ESR 31      IMAGING:     X-ray left tib-fib ordered 2/16/2024 through [facility]    12/11/2023-CT of abdomen pelvis with contrast  IMPRESSION:   1.  Right ischial decubitus ulcer extending to bone with soft tissue gas tracking along the right perineum. Appearance suggesting osteomyelitis, consider component of necrotizing fasciitis as clinically appropriate.  2.  Small pericardial effusion  3.  Left adrenal nodule, density on prior noncontrast CT demonstrates adenoma.  4.  Hepatomegaly  5.  Enlarged prostate, workup and evaluation for causes of prostate enlargement recommended as clinically appropriate.  6.  Atherosclerosis and atherosclerotic coronary artery disease    12/15/2023-bone scan of left foot  IMPRESSION:     1.  Mild increased activity in the LEFT 1st and 3rd toes on blood pool and delayed images possibly indicating inflammation/infection.  2.  No significant blood flow asymmetry.          VASCULAR STUDIES: No results found.    LAST  WOUND CULTURE:   Lab Results   Component Value Date/Time    CULTRSULT Light growth mixed enteric ade. (A) " 07/09/2025 04:00 PM    CULTRSULT Beta Streptococcus Group G  Moderate growth   (A) 07/09/2025 04:00 PM      PATHOLOGY  2/17/2023-bone fragment extracted from left lower extremity wound\\  FINAL DIAGNOSIS:     A. Left leg bone fragment at base of chronic wound:          Extensively degenerated fibrocartilaginous tissue with a rim of           fibrinopurulent debris          Correlate with culture findings            Comment: While no residual intact bone is identified, these           findings are suggestive of adjacent osteomyelitis.      ASSESSMENT AND PLAN:     1. Pressure injury of right ischium, stage 4 ([facility])  Comments: Abscess and OM found on CT during hospitalization in December 2023.  Patient underwent I&D with VAC placement.  IV antibiotics through 1/22/2024.    7/16/2025: Total wound area measures larger, however tends to be positional  - Excisional debridement of nonviable tissue from wounds in clinic today, medically necessary to promote wound healing  - VAC discontinued previously.  Patient's insurance provider declined coverage due to lack of progress.  However, would like to try again.  VAC reordered  -Patient to return to clinic weekly for assessment, debridement, and dressing change.    -Home health to change dressing 2 times per week in between clinic visits.    -Patient is very well versed on pressure relief measures, has adequate surface on bed, alternating low air loss.  -Patient has been seen by mobile wound care physician, Dr. Baez.  He is recommending wound VAC and biologic.  We had originally thought Dr. Dunlap would do surgery first.  It appears that is not in the plan.  Patient would like to continue his treatment at [facility]  Wound care: Hydrofiber silver, superabsorbent foam, Silicone foam    2. Pressure injury of left ischium, stage 4 ([facility])    7/16/2025: Wound measures larger , drainage has decreased however odor persists.  Measurements tend to be positional.  Scattered areas of dusky tissue at  base of wound  - Excisional debridement of wound in clinic today, medically necessary to promote wound healing.  -Wound culture collected from this wound after debridement last visit due to heavy drainage and odor.  Culture positive for strep group G.  Rx for Augmentin twice daily x 14 days ordered.  Will monitor  - Patient to return to clinic weekly for assessment, debridement, and dressing change  -VAC has been discontinued, see above  -Home health to change dressing 2 times per week in between clinic visits.    -Patient has new cushion in his wheelchair, see above  -Patient is very well versed on pressure relief measures, has adequate surface on bed, alternating low air loss.      Wound care: Hydrofiber silver, superabsorbent foam, Silicone foam    3. Other acute osteomyelitis, other site ([facility])    7/16/2025: Bone fragments removed during clinic visit in June 2024 were sent for pathology, polymicrobial, most concerning was an MDR ESBL Proteus.   -Patient completed IV antibiotics.  No further antibiotics for treatment of OM at this time per ID  -Patient understands he has a very low threshold for infection/sepsis.  He understands he is to go to the emergency room immediately if he begins to experience fevers, chills, nausea or vomiting, or if he notices radiating erythema or purulent drainage from his wounds.  - Consider referral back to ID if wounds remain stalled or if they deteriorate    4. Quadriplegia, C5-C7 incomplete ([facility])  Comments: Complicating factor.  Impaired mobility and sensation  -Patient is still spending 7-8 hours/day up in his wheelchair and knows to reposition frequently.  Wears heel float boots bilaterally at all times  - Patient has new custom wheelchair seat. He had refitting and appears he was not sitting back in chair enough which was causing undue pressure to IT. He is more aware of the correct positioning in chair.    5.  Laceration of lesser toe of right foot without foreign body present  or damage to nail, subsequent encounter    7/19/2025: Patient presented to the ED on 7/4 with laceration to his right foot after hitting his foot against the door jam while going into his room.  Lacerations between toes 3 and 4 and 2 and 3 were sutured  - Sutures removed in clinic today.  Partial dehiscence at base of third toe  - Topical therapy initiated    Care: Silver Hydrofiber, nonadhesive foam, Hypafix tape    My total time spent caring for the patient on the day of the encounter was 20 minutes.   This does not include time spent on separately billable procedures/tests.     Please note that this note may have been created using voice recognition software. I have worked with technical experts from [facility] to optimize the interface.  I have made every reasonable attempt to correct obvious errors, but there may be errors of grammar and possibly content that I did not discover before finalizing the note.

## 2025-07-16 NOTE — TELEPHONE ENCOUNTER
MT  Caller: Osvaldo Pate    Topic/issue: Patient was returning call.    Callback Number: [phone]    Thank you,  Audelia BRUNSON

## 2025-07-16 NOTE — TELEPHONE ENCOUNTER
Attempted to call patient to further discuss BP. Patient did not answer. VM is not set up. Unable to leave message.

## 2025-07-17 NOTE — TELEPHONE ENCOUNTER
Phone Number Called: [phone]     Call outcome: Spoke to patient regarding message below.    Message: Called to further discuss BP. Patient stating his BP's yesterday were:    7/17: 0330 /96; 1000 /82; took meds then BP in the PM 99/62.    Patient states he has not been checking his BP after taking his meds. Advised to check BP 1-2 hours after and add to his BP log. Patient verbalized understanding.      MT- Please review BP's above, has not been immediately checking after taking PRN meds. Thank you.

## 2025-07-17 NOTE — PATIENT INSTRUCTIONS
"The following information is a summary of the education provided in the clinic today. This is not an exhaustive list of the education provided during your appointment.       DRESSING CHANGES    Keep your wound dressing clean, dry, and intact. Change your dressing only if the dressing becomes soiled, leaks, gets wet, or falls off.      You may shower with the dressing(s) off. Please do not take baths, or swim in the ocean, lakes, rivers, pools, or hot tubs.      Wounds do not need to \"air out\" or \"breathe\". Gently dry your wound before placing a new dressing.     After you get out of the shower, wash the wound a second time (with soap and water, wound cleanser, or saline). Gently dry the wound before you place a new dressing.       If you need to change your dressings at home, you should wash your wound. Use normal saline, wound cleanser, hypochlorous acid, or unscented soap and water. Do not use hydrogen peroxide or rubbing alcohol to clean your wounds. Hydrogen peroxide and rubbing alcohol will damage new cells and tissue. Do not use betadine or iodine unless told to by your wound care team.    Do not soak your wounds in epsom salt baths. This can worsen your wound(s) or delay wound healing. It can also lead to infection or maceration (tissue is too wet).     If you do not have home health, the clinic will give you with leftover supplies from your appointment. We do not give out extra dressing supplies. We will order you supplies through a [email or web address] company. Your insurance may or may not pay for all these supplies. The company will reach out to you if insurance does not cover supplies. These supplies will be sent to your home within a few days. If you do have home health, they will provide wound care supplies.     The dressings we use may change as your wound changes.       GENERAL HEALTH ADVICE   -High blood sugar, like with diabetes, can delay or reverse wound healing by impacting your immune system. It also increases " the chances of infection. Wounds heal best when your blood sugar is consistently less than 140 mg/dL. It is important to check your blood sugar regularly.      -Nutrition is important for wound healing. Unless told otherwise by your doctor, eat more protein and consider supplementing with a multi-vitamin, zinc, and vitamin C.         OFFLOADING  -Offloading is important in helping treat pressure injuries. If you have a pressure injury, it's important to keep weight off of it to help wound healing. Offloading cushions help redistribute pressure. You should also change positions frequently, especially when sitting in a chair. You should reposition at least every 20-30 minutes. While some dressings help reduce pressure, but offloading cushions and regular repositioning are also necessary.       CLINIC INFORMATION  The clinic's hours are Monday-Friday, 7:30 AM to 5:00 PM. We are closed most holidays and on weekends. If you leave us a message, please allow 24 hours for someone to return your call. If you have concerns or are having a medical emergency, call 911 or go to the hospital emergency room.     You might not see the same nurse or provider every visit.     If you notice any large changes in your wound(s), or signs of infection (redness, swelling, localized heat, increased pain, fever > 101 F, chills, nausea/vomiting) or have any questions about your home care instructions, please call the wound center at [phone]. If it's after hours, contact your primary care physician or go to the hospital emergency room. If you are admitted to any hospital, you will need a new referral to come back to the wound clinic. Any wound care appointments that you already have may be cancelled.    If you are 5 or more minutes late for an appointment, we reserve the right to cancel and reschedule that appointment. For example, if your appointment is at 1:00 PM, and you arrive at 1:06 PM, you are more than five minutes late and  might not be seen. If you are consistently late or not coming to your appointments (typically 3 late cancellations and/or no shows), we reserve the right to cancel your future appointments or discharge you from the clinic. It is then your responsibility to obtain a new referral if wound care is still needed.

## 2025-07-17 NOTE — WOUND TEAM
Updated wound care orders faxed to [facility] via [employer].     NPWT ordered for ischial wounds, per provider order, through [employer]. [facility] and scanned into [employer]. Updated checkout sheet to once weekly, 90 min vac appointment.

## 2025-07-23 ENCOUNTER — OFFICE VISIT (OUTPATIENT)
Dept: WOUND CARE | Facility: MEDICAL CENTER | Age: 75
End: 2025-07-23
Payer: MEDICARE

## 2025-07-23 DIAGNOSIS — S91.114D LACERATION OF LESSER TOE OF RIGHT FOOT WITHOUT FOREIGN BODY PRESENT OR DAMAGE TO NAIL, SUBSEQUENT ENCOUNTER: ICD-10-CM

## 2025-07-23 DIAGNOSIS — M86.18 OTHER ACUTE OSTEOMYELITIS, OTHER SITE (HCC): ICD-10-CM

## 2025-07-23 DIAGNOSIS — G82.54 QUADRIPLEGIA, C5-C7, INCOMPLETE (HCC): ICD-10-CM

## 2025-07-23 DIAGNOSIS — L89.314 PRESSURE INJURY OF RIGHT ISCHIUM, STAGE 4 (HCC): Primary | ICD-10-CM

## 2025-07-23 DIAGNOSIS — L89.324 PRESSURE INJURY OF LEFT ISCHIUM, STAGE 4 (HCC): ICD-10-CM

## 2025-07-23 PROCEDURE — 99213 OFFICE O/P EST LOW 20 MIN: CPT

## 2025-07-23 PROCEDURE — 11042 DBRDMT SUBQ TIS 1ST 20SQCM/<: CPT

## 2025-07-23 PROCEDURE — 11043 DBRDMT MUSC&/FSCA 1ST 20/<: CPT | Performed by: STUDENT IN AN ORGANIZED HEALTH CARE EDUCATION/TRAINING PROGRAM

## 2025-07-23 PROCEDURE — 97605 NEG PRS WND THER DME<=50SQCM: CPT

## 2025-07-23 PROCEDURE — 11046 DBRDMT MUSC&/FSCA EA ADDL: CPT | Performed by: STUDENT IN AN ORGANIZED HEALTH CARE EDUCATION/TRAINING PROGRAM

## 2025-07-23 PROCEDURE — 11045 DBRDMT SUBQ TISS EACH ADDL: CPT

## 2025-07-23 PROCEDURE — 11046 DBRDMT MUSC&/FSCA EA ADDL: CPT

## 2025-07-23 PROCEDURE — 11043 DBRDMT MUSC&/FSCA 1ST 20/<: CPT

## 2025-07-23 NOTE — PROGRESS NOTES
Provider Encounter- Pressure Injury        HISTORY OF PRESENT ILLNESS  Wound History:    START OF CARE IN CLINIC: 1/31/2024 (return to clinic after hospitalization)    REFERRING PROVIDER: Racquel York       WOUNDS-superior coccyx pressure injury, stage IV-resolved                                 Coccyx pressure injury, stage IV-resolved           Inferior coccyx pressure injury, stage IV resolved                                 Right ischial pressure injury, stage IV                                 Left heel pressure injury, recurring stage III-resolved                                 Left posterior lower leg/calf-stage III-resolved                                 Left medial lower leg surgical wound dehiscence-resolved, reopens frequently-resolved            Left ischial pressure injury, stage IV-first observed in clinic 5/1/2024              Right third toe, plantar base-full-thickness, dehiscence of laceration repair.  First observed in clinic 7/16/2025       HISTORY: Patient with history of incomplete quadriplegia referred to [facility] for treatment of a stage IV pressure injury.  He has a history of previous pressure injuries to this area, and underwent muscle flaps in 2019, and then again in 2020.  He was seen in the wound clinic in November 2021 for an ulcer proximal from his current ulcer, and pressure injuries to his left posterior lower leg and left heel.  At that time, it was discovered that the patient had retained VAC foam embedded in the wound bed of the sacral wound.  Attempts were made to get him back to his plastic surgeon, though unsuccessful.  In January he underwent surgical removal of VAC sponge along with excisional debridement of his sacral wound by Dr. Chaves.  After the surgery, his wound went on to heal without incident.   In early April 2022, his home health nurse noted a new sacral ulcer, below the previous ulcer which quickly tripled in size over the following weeks.  The ulcer to his left  medial lower leg had also deteriorated, with bone visible at the base..  He was hospitalized from 4/22 until 4/27/2022 and underwent surgery with Dr. Raman on 4/26 for irrigation and debridement of multiple compartments of the left lower extremity, bone excision, and complex closure of chronic wound using biologic skin substitute.   His sacrococcygeal wound was not surgically addressed during this admission.  He was discharged back to his group home, with home health, and referral to outpatient wound clinic for his sacral wound.  He was instructed to follow-up with his surgeon for his lower leg wound.       Postoperatively, the left medial lower leg incision dehisced.  He was seen by his surgeon at [facility] on 5/11.  The surgeon opted to leave remaining sutures in place, and refer him to the wound clinic for treatment of this wound.   Treatment of this wound was initiated in clinic on 5/12.  During this visit was also noted that his heel DTI had resolved, but that he had a new pressure injury to his left posterior lower extremity.     A new pressure injury was noted to patient's right upper buttock/lower back on 5/20/2022.  Wound was linear in shape, skin discolored but intact.     Abrasion noted to left anterior lower leg.  First observed in clinic on 7/22/2022.  Patient states he bumped his leg into his food tray.     Small DTI noted to patient's left lateral lower leg on 7/29/2022.  Skin intact but discolored.     Large area of deep tissue injury noted to patient's left exterior lower leg.  Patient denied any trauma to this area.  Skin intact.  Wound documented.    1/27/2023: Patient was admitted to [facility] from 1/23-1/25/2023 with gross hematuria. He underwent RICHARD which showed watchman device was in place and he was taken off of Xarelto. While hospitalized wound team was consulted. He was referred back to [facility] and home health upon discharge.    Patient was hospitalized at [facility] for pyelonephritis from 2/26 until  3/2/2023, admitted for fever and general malaise.  He was admitted and initially started on linezolid and meropenem for suspected UTI and history of multidrug-resistant organisms.  Urine cultures were negative. ID was consulted, recommended CT of chest and abdomen,which were negative for acute findings. However, he was treated with 5 days total course of antibiotics for suspected UTI, and symptoms completely resolved.  During this admission, the inpatient wound team was consulted for treatment of his sacral and lower leg wounds.  A wound culture was taken from his left heel pressure ulcer, negative.  Once stabilized, he was discharged home and referred back to [facility] to resume treatment of his wounds.    Patient was hospitalized at [facility] from 12/11 until 12/23/2023, admitted for fever.  Wound infection suspected.  CT scan of abdomen and pelvis for evaluation of sacral pressure injury showed gas tracking down to the bone consistent with osteomyelitis.  He underwent I&D of right ischial ulcer (documented as buttock) with Dr. Bansal, medial tract leading to an abscess was identified.  Cavity was opened allowing it to drain into the main wound bed.  Wound VAC was placed and managed by wound team during this admission.  A bone scan of patient's left foot was also done, initially concerning for osteomyelitis.  Orthopedic surgery was consulted and did not recommend surgical intervention. ID consulted also, recommended the patient to receive IV ertapenem 1 g every 24 hours plus IV daptomycin 8 mg/kg every 24 hours through 1/22/2024.   He was discharged to [facility] on 1/23 for IV antibiotics and wound care.  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  Never used smokeless tobacco    Patient's problem list, allergies, and current medications reviewed and updated in Epic    Interval History:  Interval History thinned 7/29/2022.  Please see previous notes for complete interval history.   Interval History thinned 1/27/2023. Please see previous note for complete interval history.  Interval History thinned 3/3/2023.  Please see previous notes for complete interval history.    Interval History thinned 8/4/2023.  Please see previous notes for complete interval history.    Interval History thinned 1/31/2024.  Please see previous notes for complete interval history.    Interval History thinned 6/26/2024.  Please see previous notes for complete interval history.  Interval History thinned 4/29/2025.  Please see previous notes for complete interval history.         4/23/2025: Clinic visit with Steve Hodges MD. Patient spent more time in chair this week with Easter and wounds slightly larger though measurements are fairly positional. Less maceration and saturation of cover dressing with enluxtra, will continue.    4/30/2025: Clinic visit with Steve Hodges MD. Patient reports doing ok. Denies any acute issues. Wounds stable, continues to have heavy drainage. Will hold enluxtra today and change to superabsorbent pad to see if will manage drainage better.    5/7/2025: Clinic visit with Steve Hodges MD. Patient feels that drainage is adequately managed with current dressing. He denies any acute issues. Wounds stable.    5/14/2025: Clinic visit with Steve Hodges MD. Patient reports doing ok, denies any fevers or chills. Wounds stable. He feels that drainage has been adequately managed. He was seen in ED due to occlusion of catheter due to sediment. Recently completed treatment  for UTI.    5/21/2025: Clinic visit with Steve Hodges MD. Patient reports doing well. He continues to have heavy drainage from wounds, though no maceration. Right ischial wound larger. Left ischial wound smaller.    5/28/2025: Clinic visit with Steve Hodges MD. Patient reports doing ok. Appears that we are managing drainage better. He denies any complaints.    6/4/2025: Clinic visit with Steve Hodges MD. Patient reports doing well. Denies any acute issues. He reports that he continues to use tilt feature in wheelchair every 30 minutes. Wounds are stable.    6/11/2025: Clinic visit with Steve Hodges MD. Patient reports feeling ok, subjective fevers but otherwise asymptomatic. He was in ED yesterday with concerns of fever, they exchanged suprapubic catheter and obtained new urine culture which is in process. He received dose of fosfomycin and was discharged. Patient is concerned as temp in clinic today is borderline (100.4). He cannot get a hold of ID. I called ID APRN who will follow up with his calls.   Patient's right IT wound improved, left is stable / slightly larger though is positional. Wounds do not appear to be infected today. We discussed hyperbaric oxygen therapy, he is not interested in time commitment and not sure that there is hyperbaric oxygen clinic that would be able to accommodate quadriplegia.    6/19/2025: Clinic visit with Steve Hodges MD. Patient reports feeling normal, denies any acute issues. Urine culture was negative, not started on Abx. Declines any further fevers. Wounds stable, have not improved. Discussed wounds not improving, discussed that will likely need more intensive offloading. His quality of life is most important to him and not willing to offload to level to improve wounds.    6/25/2025: Clinic visit with Steve Hodges MD. Patient reports doing well. Denies any fevers or chills. Right wound smaller. Left wound larger, deeper, and continues to have heavy drainage.  Discussed with patient possibility of referral to Dr. Baez to be evaluated for other potential treatment modalities and possibly surgery. He is in agreement for referral and consultation.    7/2/2025 : Clinic visit with ADILSON Arthur, HOLDEN, IMAN, LILIYA.   Anjum states he is feeling well.  Drainage remains heavy.  Wounds measure about the same.  He did receive a phone call from t a nurse that works with Dr. Baez.  Anjum refused visit from RN, was under the impression he would be seeing Dr. Baez for surgical consult.  Will have referring provider, Dr. Hodges clarify.   Wounds both measure a bit smaller, drainage remains heavy and with mild odor.    7/9/2025 : Clinic visit with ADILSON Arthur, HOLDEN, IMAN, LILIYA.   States he is feeling well.  He was seen by Dr. Baez, with mobile wound care service.  Dr. Baez is recommending biologic and wound VAC.  Per patient, Dr. Baez will be ordering VAC, he does not know which biologic he is proposing.  Patient states he is considering, though not completely sure what he wants to do.  He understands that if Dr. Baez takes over his wound care, we will have to discharge him from [facility].  I reassured him that we would not offended  if he were to choose to pursue treatment with multiple  wound care.  Our main concern is that his wounds heal.   Heavy drainage and odor persist.  Wound culture collected from left ischial wound after debridement.  Will follow results.    7/16/2025 : Clinic visit with ADILSON Arthur, HOLDEN, IMAN, LILIYA.   Patient continues to do well.  Drainage from wounds has tapered off somewhat, odor persists.  Wound culture results positive for strep G.  Rx for Augmentin x 14 days sent to patient's pharmacy.   Per patient, Dr. Baez has contacted him several times regarding starting VAC and biologic.  Apparently there is no plans for surgery.  Patient states he would rather continue his wound care with us.  He is willing to consider trying VAC again.  Order placed.   However, I did caution him that his insurance may decline as previously his insurance had stopped covering due to lack of wound progress.  Will also consider trying a biologic on this wound.  Would like to try and decrease area of wound with VAC first.   Sutures from toe lacerations removed in clinic today.  Partial dehiscence noted at base of third toe, topical therapy initiated    7/23/2025: Clinic visit with Steve Hodges MD. Patient reports doing well. Tolerating Abx, continues to have mild odor. He received wound VAC, will initiate use today in clinic to left ischial ulcer. Continue wound care to right ischial ulcer due to superficial nature. Toe wound appears near resolved.    REVIEW OF SYSTEMS:   Unchanged from previous wound clinic assessment on 7/16/2025, except as noted in interval history above.      PHYSICAL EXAMINATION:   There were no vitals taken for this visit.  Physical Exam  Constitutional:       Appearance: He is obese.   Cardiovascular:      Rate and Rhythm: Normal rate.   Pulmonary:      Effort: Pulmonary effort is normal.   Abdominal:      Comments: Colostomy left lower quadrant   Genitourinary:     Comments: Suprapubic catheter to down drain   Skin:     Comments: Stage IV pressure injury to right ischium: Wound measures slightly smaller, tissue is hypergranular. Thin layer of slough to wound bed.  Moderate serosanguineous drainage, no odor.    Stage IV pressure injury of left ischium: Wound measures slightly smaller, however dimensions send to be positional.  Tissue quality slightly improved, continues to have heavy drainage and slight odor. No other evidence of infection.    Base of right third toe: Appears resolved    All other wounds remain healed, high risk for recurrence     Neurological:      Mental Status: He is alert and oriented to person, place, and time.   Psychiatric:         Mood and Affect: Mood normal.         WOUND ASSESSMENT  Wound 12/12/23 Pressure Injury Ischium Right  (Active)   Wound Image    07/23/25 1530   Site Assessment Pink;Pale;Hypergranulation 07/23/25 1530   Periwound Assessment Maceration;Scar tissue 07/23/25 1530   Margins Unattached edges 07/23/25 1530   Closure Secondary intention 04/02/25 1559   Drainage Amount Large 07/23/25 1530   Drainage Description Serosanguineous 07/23/25 1530   Treatments Cleansed;Provider debridement 07/23/25 1530   Offloading/DME Sacral offloading dressing;Offloading cushion 07/23/25 1530   ** Retired** Wound Cleansing Hypochlorus Acid 05/07/25 1400   Wound Cleansing Hypochlorus Acid;Foam Cleanser/Washcloth 07/23/25 1530   Periwound Protectant Skin Protectant Wipes to Periwound;TRIAD paste 07/23/25 1530   Dressing Status Old drainage 05/21/25 1530   Dressing Changed Changed 05/28/25 1600   Dressing Cleansing/Solutions Not Applicable 07/23/25 1530   Dressing Options Hydrofiber Silver;Super Absorbent Pad;Sacral Dressing - Offloading 07/23/25 1530   Dressing Change/Treatment Frequency Monday, Wednesday, Friday, and As Needed 07/23/25 1530   WOUND NURSE ONLY - Pressure Injury Stage 4 07/23/25 1530   Non-staged Wound Description Full thickness 05/28/25 1600   Wound Length (cm) 4.6 cm 07/23/25 1530   Wound Width (cm) 2.1 cm 07/23/25 1530   Wound Depth (cm) 0.5 cm 07/23/25 1530   Wound Surface Area (cm^2) 7.59 cm^2 07/23/25 1530   Wound Volume (cm^3) 2.529 cm^3 07/23/25 1530   Post-Procedure Length (cm) 4.6 cm 07/23/25 1530   Post-Procedure Width (cm) 2.3 cm 07/23/25 1530   Post-Procedure Depth (cm) 0.5 cm 07/23/25 1530   Post-Procedure Surface Area (cm^2) 8.31 cm^2 07/23/25 1530   Post-Procedure Volume (cm^3) 2.77 cm^3 07/23/25 1530   Wound Healing % 95 07/23/25 1530   Tunneling (cm) 0 cm 07/23/25 1530   Tunneling Clock Position of Wound 6 05/14/25 1705   Undermining (cm) 0.4 cm 07/23/25 1530   Undermining of Wound, 1st Location From 12 o'clock;To 3 o'clock 07/23/25 1530   Wound Odor Mild;Foul 07/23/25 1530   Exposed Structures None 07/23/25 1530    Number of days: 589       Wound 05/01/24 Pressure Injury Ischium Left (Active)   Wound Image    07/23/25 1530   Site Assessment Red;Yellow;Undermining 07/23/25 1530   Periwound Assessment Maceration;Scar tissue 07/23/25 1530   Margins Unattached edges 07/23/25 1530   Closure Secondary intention 04/02/25 1559   Drainage Amount Copious 07/23/25 1530   Drainage Description Serosanguineous 07/23/25 1530   Treatments Cleansed;Provider debridement 07/23/25 1530   Offloading/DME Sacral offloading dressing;Offloading cushion 07/23/25 1530   ** Retired** Wound Cleansing Hypochlorus Acid 05/07/25 1400   Wound Cleansing Hypochlorus Acid;Foam Cleanser/Washcloth 07/23/25 1530   Periwound Protectant Skin Protectant Wipes to Periwound;Drape 07/23/25 1530   Dressing Changed Changed 05/07/25 1400   Dressing Cleansing/Solutions Not Applicable 07/23/25 1530   Dressing Options Wound Vac;Sacral Dressing - Offloading 07/23/25 1530   Dressing Change/Treatment Frequency Monday, Wednesday, Friday, and As Needed 07/23/25 1530   Wound Team Following Weekly 05/07/25 1400   WOUND NURSE ONLY - Pressure Injury Stage 4 07/23/25 1530   Non-staged Wound Description Not applicable 05/07/25 1400   Wound Length (cm) 6 cm 07/23/25 1530   Wound Width (cm) 3.1 cm 07/23/25 1530   Wound Depth (cm) 3.4 cm 07/23/25 1530   Wound Surface Area (cm^2) 14.61 cm^2 07/23/25 1530   Wound Volume (cm^3) 33.112 cm^3 07/23/25 1530   Post-Procedure Length (cm) 6 cm 07/23/25 1530   Post-Procedure Width (cm) 3.3 cm 07/23/25 1530   Post-Procedure Depth (cm) 3.4 cm 07/23/25 1530   Post-Procedure Surface Area (cm^2) 15.55 cm^2 07/23/25 1530   Post-Procedure Volume (cm^3) 35.249 cm^3 07/23/25 1530   Wound Healing % -60730 07/23/25 1530   Tunneling (cm) 0 cm 07/23/25 1530   Undermining (cm) 2.5 cm 07/23/25 1530   Undermining of Wound, 1st Location From 12 o'clock;To 1 o'clock 07/23/25 1530   Undermining (cm) - 2nd location 0 cm 05/28/25 1600   Undermining of Wound, 2nd  "Location From 5 o'clock;To 7 o'clock 05/07/25 1400   Wound Odor Mild;Foul 07/23/25 1530   Exposed Structures Bone;Fascia 07/23/25 1530   Number of days: 448       PROCEDURE: Excisional debridement of right and left ischial wounds  -2% viscous lidocaine applied topically to wound bed for approximately 5 minutes prior to debridement  -Curette used to debride both wound beds.  Excisional debridement was performed to remove devitalized tissue until healthy, bleeding tissue was visualized.  Total area debrided was 23.86 cm², including into undermined areas of wounds.  Tissue debrided into the muscle / fascia layer.    -Bleeding controlled with manual pressure and silver nitrate.  -Wound care completed by wound RN, refer to flowsheet  -Patient tolerated the procedure well, without c/o pain or discomfort.    Pertinent Labs and Diagnostics:    Labs:     A1c: No results found for: \"HBA1C\"     [facility] results, 7/1/2022 (under media tab)    CRP 13    ESR 31      IMAGING:     X-ray left tib-fib ordered 2/16/2024 through [facility]    12/11/2023-CT of abdomen pelvis with contrast  IMPRESSION:   1.  Right ischial decubitus ulcer extending to bone with soft tissue gas tracking along the right perineum. Appearance suggesting osteomyelitis, consider component of necrotizing fasciitis as clinically appropriate.  2.  Small pericardial effusion  3.  Left adrenal nodule, density on prior noncontrast CT demonstrates adenoma.  4.  Hepatomegaly  5.  Enlarged prostate, workup and evaluation for causes of prostate enlargement recommended as clinically appropriate.  6.  Atherosclerosis and atherosclerotic coronary artery disease    12/15/2023-bone scan of left foot  IMPRESSION:     1.  Mild increased activity in the LEFT 1st and 3rd toes on blood pool and delayed images possibly indicating inflammation/infection.  2.  No significant blood flow asymmetry.          VASCULAR STUDIES: No results found.    LAST  WOUND CULTURE:   Lab " Results   Component Value Date/Time    CULTRSULT Light growth mixed enteric ade. (A) 07/09/2025 04:00 PM    CULTRSULT Beta Streptococcus Group G  Moderate growth   (A) 07/09/2025 04:00 PM      PATHOLOGY  2/17/2023-bone fragment extracted from left lower extremity wound\\  FINAL DIAGNOSIS:     A. Left leg bone fragment at base of chronic wound:          Extensively degenerated fibrocartilaginous tissue with a rim of           fibrinopurulent debris          Correlate with culture findings            Comment: While no residual intact bone is identified, these           findings are suggestive of adjacent osteomyelitis.      ASSESSMENT AND PLAN:     1. Pressure injury of right ischium, stage 4 (HCC)  Comments: Abscess and OM found on CT during hospitalization in December 2023.  Patient underwent I&D with VAC placement.  IV antibiotics through 1/22/2024.    7/23/2025: Wound smaller.  - Excisional debridement of nonviable tissue from wounds in clinic today, medically necessary to promote wound healing  - Will defer application of VAC to wound due to lack of depth.  -Patient to return to clinic weekly for assessment, debridement, and dressing change.    -Home health to change dressing 2 times per week in between clinic visits.    -Patient is very well versed on pressure relief measures, has adequate surface on bed, alternating low air loss.    Wound care: Hydrofiber silver, superabsorbent foam, Silicone foam    2. Pressure injury of left ischium, stage 4 (HCC)    7/23/2025: Wound measures smaller, continues to have heavy drainage, odor improved.  - Excisional debridement of wound in clinic today, medically necessary to promote wound healing.  -Wound culture collected from this wound after debridement last visit due to heavy drainage and odor.  Culture positive for strep group G. Patient taking Abx Augmentin twice daily x 14 days  - Patient to return to clinic weekly for assessment, debridement, and dressing change  -  Will initiate VAC therapy to left ischial wound  -Home health to change dressing 2 times per week in between clinic visits.    -Patient has new cushion in his wheelchair, see above  -Patient is very well versed on pressure relief measures, has adequate surface on bed, alternating low air loss.  -Patient has been seen by mobile wound care physician, Dr. Baez.  He is recommending wound VAC and biologic.  We had originally thought Dr. Dunlap would do surgery first.  It appears that is not in the plan.  Patient would like to continue his treatment at [facility]  Wound care: NPWT -125mmHg continuous with black foam    3. Other acute osteomyelitis, other site ([facility])    7/23/2025: Bone fragments removed during clinic visit in June 2024 were sent for pathology, polymicrobial, most concerning was an MDR ESBL Proteus.   -Patient completed IV antibiotics.  No further antibiotics for treatment of OM at this time per ID  -Patient understands he has a very low threshold for infection/sepsis.  He understands he is to go to the emergency room immediately if he begins to experience fevers, chills, nausea or vomiting, or if he notices radiating erythema or purulent drainage from his wounds.  - Consider referral back to ID if wounds remain stalled or if they deteriorate    4. Quadriplegia, C5-C7 incomplete ([facility])  Comments: Complicating factor.  Impaired mobility and sensation  -Patient is still spending 7-8 hours/day up in his wheelchair and knows to reposition frequently.  Wears heel float boots bilaterally at all times  - Patient has new custom wheelchair seat. He had refitting and appears he was not sitting back in chair enough which was causing undue pressure to IT. He is more aware of the correct positioning in chair.    5.  Laceration of lesser toe of right foot without foreign body present or damage to nail, subsequent encounter    7/23/2025: Patient presented to the ED on 7/4 with laceration to his right foot after hitting his foot against  the door jam while going into his room.  Lacerations between toes 3 and 4 and 2 and 3 were sutured  - Appears resolved  - Will monitor closely.  Wound Care: foam, tape.    Please note that this note may have been created using voice recognition software. I have worked with technical experts from [facility] to optimize the interface.  I have made every reasonable attempt to correct obvious errors, but there may be errors of grammar and possibly content that I did not discover before finalizing the note.

## 2025-07-23 NOTE — PATIENT INSTRUCTIONS
"The following information is a summary of the education provided in the clinic today. This is not an exhaustive list of the education provided during your appointment.       DRESSING CHANGES    Keep your wound dressing clean, dry, and intact. Change your dressing if the dressing becomes soiled, leaks, gets wet, or falls off.      You may not shower with the dressing(s) off. Please do not take baths, or swim in the ocean, lakes, rivers, pools, or hot tubs.      Wounds do not need to \"air out\" or \"breathe\". Gently dry your wound before placing a new dressing.     After you get out of the shower, wash the wound a second time (with soap and water, wound cleanser, or saline). Gently dry the wound before you place a new dressing.       If you need to change your dressings at home, you should wash your wound. Use normal saline, wound cleanser, hypochlorous acid, or unscented soap and water. Do not use hydrogen peroxide or rubbing alcohol to clean your wounds. Hydrogen peroxide and rubbing alcohol will damage new cells and tissue. Do not use betadine or iodine unless told to by your wound care team.    Do not soak your wounds in epsom salt baths. This can worsen your wound(s) or delay wound healing. It can also lead to infection or maceration (tissue is too wet).     If you do not have home health, the clinic will give you with leftover supplies from your appointment. We do not give out extra dressing supplies. We will order you supplies through a [employer] company. Your insurance may or may not pay for all these supplies. The company will reach out to you if insurance does not cover supplies. These supplies will be sent to your home within a few days. If you do have home health, they will provide wound care supplies.     The dressings we use may change as your wound changes.       OFFLOADING  -Offloading is important in helping treat pressure injuries. If you have a pressure injury, it's important to keep weight off of it to " help wound healing. Offloading cushions help redistribute pressure. You should also change positions frequently, especially when sitting in a chair. You should reposition at least every 20-30 minutes. While some dressings help reduce pressure, but offloading cushions and regular repositioning are also necessary.       WOUND VACS   -Wound VACs (negative pressure wound therapy) use a vacuum to help control drainage and help your wound heal faster. These are usually changed three times weekly. Your VAC may or may not have drainage in the tubing or canister. This is normal. If the canister is full, change it by pressing the tab on the end, throw it away, then snap on a new one. Reconnect the tubing to your dressing. If you notice the canister is filled with bright red blood, go to the ER. If you are having issues with your wound VAC, consider patching leaks, changing the canister, or calling [phone] for troubleshooting. If the wound VAC has been off or not working for over 2 hours, call the wound care clinic. If you have home health, call them to help with restarting the VAC.   - If you must remove the wound VAC, remove the whole dressing, including all of the foam. Then, moisten gauze with saline or clean bottled water. Place the damp gauze over the wound and then cover it with a dry dressing. Change this dressing daily or as needed until you can get into the clinic or seen by home health, if you have home health.         CLINIC INFORMATION  The clinic's hours are Monday-Friday, 7:30 AM to 5:00 PM. We are closed most holidays and on weekends. If you leave us a message, please allow 24 hours for someone to return your call. If you have concerns or are having a medical emergency, call 911 or go to the hospital emergency room.     You might not see the same nurse or provider every visit.     If you notice any large changes in your wound(s), or signs of infection (redness, swelling, localized heat, increased pain,  fever > 101 F, chills, nausea/vomiting) or have any questions about your home care instructions, please call the wound center at [phone]. If it's after hours, contact your primary care physician or go to the hospital emergency room. If you are admitted to any hospital, you will need a new referral to come back to the wound clinic. Any wound care appointments that you already have may be cancelled.    If you are 5 or more minutes late for an appointment, we reserve the right to cancel and reschedule that appointment. For example, if your appointment is at 1:00 PM, and you arrive at 1:06 PM, you are more than five minutes late and might not be seen. If you are consistently late or not coming to your appointments (typically 3 late cancellations and/or no shows), we reserve the right to cancel your future appointments or discharge you from the clinic. It is then your responsibility to obtain a new referral if wound care is still needed.

## 2025-07-23 NOTE — PROGRESS NOTES
NPWT applied to left ischium wound this visit using 2 pieces of black foam. Pump set to neg 125mmhg continuous. No leaks noted.     Home health orders routed to [facility] via [employer].

## 2025-07-25 ENCOUNTER — TELEPHONE (OUTPATIENT)
Dept: CARDIOLOGY | Facility: MEDICAL CENTER | Age: 75
End: 2025-07-25
Payer: MEDICARE

## 2025-07-25 NOTE — TELEPHONE ENCOUNTER
S/w patient and he stated his BP has still been elevated BP readings:    7/19: /74, took amlodipine, 2 hours later /111  7/20: /82, took amlodipine, 2 hours later /91  7/21: /88, took amlodipine, 2 hours later /89  7/22: BP at MD office with their cuff 100/60, 2 hours later 173/96, took amlodipine, 2 hours later 173/103  7/23: /93, took amlodipine, 2 hours later /119  7/24: /87, took amlodipine, 2 hours later /76  7/25: /65    Patient has no symptoms and is staying hydrated.  Patient reports he is using an arm cuff but it is older. Patient scheduled for BP check on Tuesday 7/29/25 at 1100 and will bring his cuff.  Will continue to monitor and log his BP until then.

## 2025-07-25 NOTE — TELEPHONE ENCOUNTER
Patient called into the office. Requested to speak to ZAIRA Leigh regarding his blood pressure. He would like a call back to discuss further. Thank you!

## 2025-07-29 ENCOUNTER — NON-PROVIDER VISIT (OUTPATIENT)
Dept: CARDIOLOGY | Facility: MEDICAL CENTER | Age: 75
End: 2025-07-29
Attending: NURSE PRACTITIONER
Payer: MEDICARE

## 2025-07-29 VITALS — OXYGEN SATURATION: 95 % | SYSTOLIC BLOOD PRESSURE: 187 MMHG | HEART RATE: 67 BPM | DIASTOLIC BLOOD PRESSURE: 97 MMHG

## 2025-07-29 PROCEDURE — 99211 OFF/OP EST MAY X REQ PHY/QHP: CPT | Performed by: NURSE PRACTITIONER

## 2025-07-29 NOTE — NON-PROVIDER
Patient was here today for BP check. BP readings located in vital sign section. Informed patient we will forward readings to nurse and they will receive a call with recommendations.  Did patient present with home cuff? Yes  Is patient reporting any symptoms? No pt reported no symptoms.   If Yes, RN to visit exam room

## 2025-07-30 ENCOUNTER — OFFICE VISIT (OUTPATIENT)
Dept: WOUND CARE | Facility: MEDICAL CENTER | Age: 75
End: 2025-07-30
Payer: MEDICARE

## 2025-07-30 DIAGNOSIS — L89.314 PRESSURE INJURY OF RIGHT ISCHIUM, STAGE 4 (HCC): Primary | ICD-10-CM

## 2025-07-30 DIAGNOSIS — S91.114D LACERATION OF LESSER TOE OF RIGHT FOOT WITHOUT FOREIGN BODY PRESENT OR DAMAGE TO NAIL, SUBSEQUENT ENCOUNTER: ICD-10-CM

## 2025-07-30 DIAGNOSIS — M86.18 OTHER ACUTE OSTEOMYELITIS, OTHER SITE (HCC): ICD-10-CM

## 2025-07-30 DIAGNOSIS — L89.324 PRESSURE INJURY OF LEFT ISCHIUM, STAGE 4 (HCC): ICD-10-CM

## 2025-07-30 DIAGNOSIS — G82.54 QUADRIPLEGIA, C5-C7, INCOMPLETE (HCC): ICD-10-CM

## 2025-07-30 PROCEDURE — 11046 DBRDMT MUSC&/FSCA EA ADDL: CPT

## 2025-07-30 PROCEDURE — 11043 DBRDMT MUSC&/FSCA 1ST 20/<: CPT

## 2025-07-30 PROCEDURE — 99213 OFFICE O/P EST LOW 20 MIN: CPT

## 2025-07-30 NOTE — PROGRESS NOTES
NPWT dressing change.  2 pieces of black foam removed and 2 pieces of black foam replaced.  One to the wound bed and one to bridge to right hip and accommodate trac pad.  Wound vac re started at 125 mmHg continuous with no leaks detected.  Sacral offloading dressings used for added cushioning.

## 2025-07-31 NOTE — PATIENT INSTRUCTIONS
-Keep your wound dressing clean, dry, and intact. Only change dressing if it's over saturated, soiled or falls off.     -Change your dressing if it becomes soiled, soaked, or falls off.    -Wound vac may not have any drainage in tube or cannister & it will still be working.   Change cannister if it does become full by pressing tab on side of machine to remove canister and snap on new one. Full canister can be thrown in the trash. If cannister fills with bright red blood - go to ER. Dressing will be changed every MWF at the wound clini.  If you are having issues with your wound VAC, please consider patching leaks, changing the canister, or calling [phone] for troubleshooting. If the wound VAC has been off or un-operational for over 2 hours, call wound care center to inform them and remove all dressings including black foam and replace with normal saline damp gauze.     -Should you experience any significant changes in your wound(s), such as infection (redness, swelling, localized heat, increased pain, fever > 101 F, chills) or have any questions regarding your home care instructions, please contact the wound center at [phone]. If after hours, contact your primary care physician or go to the hospital emergency room.     If you are admitted to any hospital, you will need a new referral to come back to the wound clinic and any scheduled appointments that you already have, may be cancelled.

## 2025-07-31 NOTE — PROGRESS NOTES
Provider Encounter- Pressure Injury        HISTORY OF PRESENT ILLNESS  Wound History:    START OF CARE IN CLINIC: 1/31/2024 (return to clinic after hospitalization)    REFERRING PROVIDER: Racquel York       WOUNDS-superior coccyx pressure injury, stage IV-resolved                                 Coccyx pressure injury, stage IV-resolved           Inferior coccyx pressure injury, stage IV resolved                                 Right ischial pressure injury, stage IV                                 Left heel pressure injury, recurring stage III-resolved                                 Left posterior lower leg/calf-stage III-resolved                                 Left medial lower leg surgical wound dehiscence-resolved, reopens frequently-resolved            Left ischial pressure injury, stage IV-first observed in clinic 5/1/2024              Right third toe, plantar base-full-thickness, dehiscence of laceration repair.  First observed in clinic 7/16/2025.  Resolved       HISTORY: Patient with history of incomplete quadriplegia referred to [facility] for treatment of a stage IV pressure injury.  He has a history of previous pressure injuries to this area, and underwent muscle flaps in 2019, and then again in 2020.  He was seen in the wound clinic in November 2021 for an ulcer proximal from his current ulcer, and pressure injuries to his left posterior lower leg and left heel.  At that time, it was discovered that the patient had retained VAC foam embedded in the wound bed of the sacral wound.  Attempts were made to get him back to his plastic surgeon, though unsuccessful.  In January he underwent surgical removal of VAC sponge along with excisional debridement of his sacral wound by Dr. Chaves.  After the surgery, his wound went on to heal without incident.   In early April 2022, his home health nurse noted a new sacral ulcer, below the previous ulcer which quickly tripled in size over the following weeks.  The ulcer  to his left medial lower leg had also deteriorated, with bone visible at the base..  He was hospitalized from 4/22 until 4/27/2022 and underwent surgery with Dr. Raman on 4/26 for irrigation and debridement of multiple compartments of the left lower extremity, bone excision, and complex closure of chronic wound using biologic skin substitute.   His sacrococcygeal wound was not surgically addressed during this admission.  He was discharged back to his group home, with home health, and referral to outpatient wound clinic for his sacral wound.  He was instructed to follow-up with his surgeon for his lower leg wound.       Postoperatively, the left medial lower leg incision dehisced.  He was seen by his surgeon at [facility] on 5/11.  The surgeon opted to leave remaining sutures in place, and refer him to the wound clinic for treatment of this wound.   Treatment of this wound was initiated in clinic on 5/12.  During this visit was also noted that his heel DTI had resolved, but that he had a new pressure injury to his left posterior lower extremity.     A new pressure injury was noted to patient's right upper buttock/lower back on 5/20/2022.  Wound was linear in shape, skin discolored but intact.     Abrasion noted to left anterior lower leg.  First observed in clinic on 7/22/2022.  Patient states he bumped his leg into his food tray.     Small DTI noted to patient's left lateral lower leg on 7/29/2022.  Skin intact but discolored.     Large area of deep tissue injury noted to patient's left exterior lower leg.  Patient denied any trauma to this area.  Skin intact.  Wound documented.    1/27/2023: Patient was admitted to [facility] from 1/23-1/25/2023 with gross hematuria. He underwent RICHARD which showed watchman device was in place and he was taken off of Xarelto. While hospitalized wound team was consulted. He was referred back to [facility] and home health upon discharge.    Patient was hospitalized at [facility] for pyelonephritis from 2/26  until 3/2/2023, admitted for fever and general malaise.  He was admitted and initially started on linezolid and meropenem for suspected UTI and history of multidrug-resistant organisms.  Urine cultures were negative. ID was consulted, recommended CT of chest and abdomen,which were negative for acute findings. However, he was treated with 5 days total course of antibiotics for suspected UTI, and symptoms completely resolved.  During this admission, the inpatient wound team was consulted for treatment of his sacral and lower leg wounds.  A wound culture was taken from his left heel pressure ulcer, negative.  Once stabilized, he was discharged home and referred back to [facility] to resume treatment of his wounds.    Patient was hospitalized at [facility] from 12/11 until 12/23/2023, admitted for fever.  Wound infection suspected.  CT scan of abdomen and pelvis for evaluation of sacral pressure injury showed gas tracking down to the bone consistent with osteomyelitis.  He underwent I&D of right ischial ulcer (documented as buttock) with Dr. Bansal, medial tract leading to an abscess was identified.  Cavity was opened allowing it to drain into the main wound bed.  Wound VAC was placed and managed by wound team during this admission.  A bone scan of patient's left foot was also done, initially concerning for osteomyelitis.  Orthopedic surgery was consulted and did not recommend surgical intervention. ID consulted also, recommended the patient to receive IV ertapenem 1 g every 24 hours plus IV daptomycin 8 mg/kg every 24 hours through 1/22/2024.   He was discharged to [facility] on 1/23 for IV antibiotics and wound care.  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  Never used smokeless tobacco    Patient's problem list, allergies, and current medications reviewed and updated in Epic    Interval History:  Interval History thinned 7/29/2022.  Please see previous notes for complete interval history.   Interval History thinned 1/27/2023. Please see previous note for complete interval history.  Interval History thinned 3/3/2023.  Please see previous notes for complete interval history.    Interval History thinned 8/4/2023.  Please see previous notes for complete interval history.    Interval History thinned 1/31/2024.  Please see previous notes for complete interval history.    Interval History thinned 6/26/2024.  Please see previous notes for complete interval history.  Interval History thinned 4/29/2025.  Please see previous notes for complete interval history.         4/23/2025: Clinic visit with Steve Hodges MD. Patient spent more time in chair this week with Easter and wounds slightly larger though measurements are fairly positional. Less maceration and saturation of cover dressing with enluxtra, will continue.    4/30/2025: Clinic visit with Steve Hodges MD. Patient reports doing ok. Denies any acute issues. Wounds stable, continues to have heavy drainage. Will hold enluxtra today and change to superabsorbent pad to see if will manage drainage better.    5/7/2025: Clinic visit with Steve Hodges MD. Patient feels that drainage is adequately managed with current dressing. He denies any acute issues. Wounds stable.    5/14/2025: Clinic visit with Steve Hodges MD. Patient reports doing ok, denies any fevers or chills. Wounds stable. He feels that drainage has been adequately managed. He was seen in ED due to occlusion of catheter due to sediment. Recently completed treatment  for UTI.    5/21/2025: Clinic visit with Steve Hodges MD. Patient reports doing well. He continues to have heavy drainage from wounds, though no maceration. Right ischial wound larger. Left ischial wound smaller.    5/28/2025: Clinic visit with Steve Hodges MD. Patient reports doing ok. Appears that we are managing drainage better. He denies any complaints.    6/4/2025: Clinic visit with Steve Hodges MD. Patient reports doing well. Denies any acute issues. He reports that he continues to use tilt feature in wheelchair every 30 minutes. Wounds are stable.    6/11/2025: Clinic visit with Steve Hodges MD. Patient reports feeling ok, subjective fevers but otherwise asymptomatic. He was in ED yesterday with concerns of fever, they exchanged suprapubic catheter and obtained new urine culture which is in process. He received dose of fosfomycin and was discharged. Patient is concerned as temp in clinic today is borderline (100.4). He cannot get a hold of ID. I called ID APRN who will follow up with his calls.   Patient's right IT wound improved, left is stable / slightly larger though is positional. Wounds do not appear to be infected today. We discussed hyperbaric oxygen therapy, he is not interested in time commitment and not sure that there is hyperbaric oxygen clinic that would be able to accommodate quadriplegia.    6/19/2025: Clinic visit with Steve Hodges MD. Patient reports feeling normal, denies any acute issues. Urine culture was negative, not started on Abx. Declines any further fevers. Wounds stable, have not improved. Discussed wounds not improving, discussed that will likely need more intensive offloading. His quality of life is most important to him and not willing to offload to level to improve wounds.    6/25/2025: Clinic visit with Steve Hodges MD. Patient reports doing well. Denies any fevers or chills. Right wound smaller. Left wound larger, deeper, and continues to have heavy drainage.  Discussed with patient possibility of referral to Dr. Baez to be evaluated for other potential treatment modalities and possibly surgery. He is in agreement for referral and consultation.    7/2/2025 : Clinic visit with ADILSON Arthur, HOLDEN, IMAN, LILIYA.   Anjum states he is feeling well.  Drainage remains heavy.  Wounds measure about the same.  He did receive a phone call from t a nurse that works with Dr. Baez.  Anjum refused visit from RN, was under the impression he would be seeing Dr. Baez for surgical consult.  Will have referring provider, Dr. Hodges clarify.   Wounds both measure a bit smaller, drainage remains heavy and with mild odor.    7/9/2025 : Clinic visit with ADILSON Arthur, HOLDEN, IMAN, LILIYA.   States he is feeling well.  He was seen by Dr. Baez, with mobile wound care service.  Dr. Baez is recommending biologic and wound VAC.  Per patient, Dr. Baez will be ordering VAC, he does not know which biologic he is proposing.  Patient states he is considering, though not completely sure what he wants to do.  He understands that if Dr. Baez takes over his wound care, we will have to discharge him from [facility].  I reassured him that we would not offended  if he were to choose to pursue treatment with multiple  wound care.  Our main concern is that his wounds heal.   Heavy drainage and odor persist.  Wound culture collected from left ischial wound after debridement.  Will follow results.    7/16/2025 : Clinic visit with ADILSON Arthur, HOLDEN, IMAN, LILIYA.   Patient continues to do well.  Drainage from wounds has tapered off somewhat, odor persists.  Wound culture results positive for strep G.  Rx for Augmentin x 14 days sent to patient's pharmacy.   Per patient, Dr. Baez has contacted him several times regarding starting VAC and biologic.  Apparently there is no plans for surgery.  Patient states he would rather continue his wound care with us.  He is willing to consider trying VAC again.  Order placed.   However, I did caution him that his insurance may decline as previously his insurance had stopped covering due to lack of wound progress.  Will also consider trying a biologic on this wound.  Would like to try and decrease area of wound with VAC first.   Sutures from toe lacerations removed in clinic today.  Partial dehiscence noted at base of third toe, topical therapy initiated    7/23/2025: Clinic visit with Steve Hodges MD. Patient reports doing well. Tolerating Abx, continues to have mild odor. He received wound VAC, will initiate use today in clinic to left ischial ulcer. Continue wound care to right ischial ulcer due to superficial nature. Toe wound appears near resolved.    7/30/2025 : Clinic visit with ADILSON Arthur, DAT-BC, ALEXN, CFTRACI.   Patient states he is feeling well.  Tolerating VAC without any difficulty.    REVIEW OF SYSTEMS:   Unchanged from previous wound clinic assessment on 7/23/2025, except as noted in interval history above.      PHYSICAL EXAMINATION:   There were no vitals taken for this visit.  Physical Exam  Constitutional:       Appearance: He is obese.   Cardiovascular:      Rate and Rhythm: Normal rate.   Pulmonary:      Effort: Pulmonary effort is normal.   Abdominal:      Comments: Colostomy left lower quadrant   Genitourinary:     Comments: Suprapubic catheter to down drain   Skin:     Comments: Stage IV pressure injury to right ischium: Wound area has decreased, track still probes bone.  Thin layer slough to wound bed.  Moderate serosanguineous drainage.  No evidence of infection    Stage IV pressure injury of left ischium: Wound measures about the same, however dimensions send to be positional.  Still probes to bone at deepest points.  Scattered areas of slough to wound bed.  Moderate serosanguineous drainage.  No evidence of infection    Base of right third toe: Resolved  All other wounds remain healed, high risk for recurrence     Neurological:      Mental Status: He is  alert and oriented to person, place, and time.   Psychiatric:         Mood and Affect: Mood normal.         WOUND ASSESSMENT  Wound 12/12/23 Pressure Injury Ischium Right (Active)   Wound Image    07/30/25 1600   Site Assessment Pink;Pale;Hypergranulation 07/30/25 1600   Periwound Assessment Maceration;Scar tissue 07/30/25 1600   Margins Unattached edges 07/30/25 1600   Closure Secondary intention 04/02/25 1559   Drainage Amount Large 07/30/25 1600   Drainage Description Serosanguineous 07/30/25 1600   Treatments Cleansed;Provider debridement 07/30/25 1600   Offloading/DME Sacral offloading dressing 07/30/25 1600   ** Retired** Wound Cleansing Hypochlorus Acid 05/07/25 1400   Wound Cleansing Foam Cleanser/Washcloth;Hypochlorus Acid 07/30/25 1600   Periwound Protectant Skin Protectant Wipes to Periwound;TRIAD paste;Skin Moisturizer 07/30/25 1600   Dressing Status Old drainage 05/21/25 1530   Dressing Changed Changed 05/28/25 1600   Dressing Cleansing/Solutions Not Applicable 07/30/25 1600   Dressing Options Hydrofiber Silver;Super Absorbent Pad;Sacral Dressing - Offloading 07/30/25 1600   Dressing Change/Treatment Frequency Monday, Wednesday, Friday, and As Needed 07/30/25 1600   WOUND NURSE ONLY - Pressure Injury Stage 4 07/30/25 1600   Non-staged Wound Description Full thickness 05/28/25 1600   Wound Length (cm) 4 cm 07/30/25 1600   Wound Width (cm) 2.1 cm 07/30/25 1600   Wound Depth (cm) 0.5 cm 07/30/25 1600   Wound Surface Area (cm^2) 6.6 cm^2 07/30/25 1600   Wound Volume (cm^3) 2.199 cm^3 07/30/25 1600   Post-Procedure Length (cm) 4.2 cm 07/30/25 1600   Post-Procedure Width (cm) 2.2 cm 07/30/25 1600   Post-Procedure Depth (cm) 0.5 cm 07/30/25 1600   Post-Procedure Surface Area (cm^2) 7.26 cm^2 07/30/25 1600   Post-Procedure Volume (cm^3) 2.419 cm^3 07/30/25 1600   Wound Healing % 96 07/30/25 1600   Tunneling (cm) 0 cm 07/30/25 1600   Tunneling Clock Position of Wound 6 05/14/25 1705   Undermining (cm) 0.4 cm  07/30/25 1600   Undermining of Wound, 1st Location From 12 o'clock;To 2 o'clock 07/30/25 1600   Wound Odor Strong;Foul 07/30/25 1600   Exposed Structures None 07/30/25 1600   Number of days: 596       Wound 05/01/24 Pressure Injury Ischium Left (Active)   Wound Image    07/30/25 1600   Site Assessment Red;Yellow;Undermining 07/30/25 1600   Periwound Assessment Maceration;Scar tissue 07/30/25 1600   Margins Unattached edges 07/30/25 1600   Closure Secondary intention 04/02/25 1559   Drainage Amount Copious 07/30/25 1600   Drainage Description Serosanguineous 07/30/25 1600   Treatments Cleansed;Provider debridement 07/30/25 1600   Offloading/DME Sacral offloading dressing 07/30/25 1600   ** Retired** Wound Cleansing Hypochlorus Acid 05/07/25 1400   Wound Cleansing Foam Cleanser/Washcloth;Hypochlorus Acid 07/30/25 1600   Periwound Protectant Skin Protectant Wipes to Periwound;Paste Ring;Drape 07/30/25 1600   Dressing Changed Changed 05/07/25 1400   Dressing Cleansing/Solutions Not Applicable 07/30/25 1600   Dressing Options Wound Vac;Sacral Dressing 07/30/25 1600   Dressing Change/Treatment Frequency Monday, Wednesday, Friday, and As Needed 07/30/25 1600   Wound Team Following Weekly 05/07/25 1400   WOUND NURSE ONLY - Pressure Injury Stage 4 07/30/25 1600   Non-staged Wound Description Not applicable 05/07/25 1400   Wound Length (cm) 6 cm 07/30/25 1600   Wound Width (cm) 3.2 cm 07/30/25 1600   Wound Depth (cm) 3.2 cm 07/30/25 1600   Wound Surface Area (cm^2) 15.08 cm^2 07/30/25 1600   Wound Volume (cm^3) 32.17 cm^3 07/30/25 1600   Post-Procedure Length (cm) 6.2 cm 07/30/25 1600   Post-Procedure Width (cm) 3.2 cm 07/30/25 1600   Post-Procedure Depth (cm) 3.2 cm 07/30/25 1600   Post-Procedure Surface Area (cm^2) 15.58 cm^2 07/30/25 1600   Post-Procedure Volume (cm^3) 33.242 cm^3 07/30/25 1600   Wound Healing % -86032 07/30/25 1600   Tunneling (cm) 0 cm 07/23/25 1530   Undermining (cm) 3.2 cm 07/30/25 1600   Undermining  "of Wound, 1st Location From 12 o'clock;To 2 o'clock 07/30/25 1600   Undermining (cm) - 2nd location 0 cm 05/28/25 1600   Undermining of Wound, 2nd Location From 5 o'clock;To 7 o'clock 05/07/25 1400   Wound Odor Strong;Foul 07/30/25 1600   Exposed Structures Bone;Fascia 07/30/25 1600   Number of days: 455       PROCEDURE: Excisional debridement of right and left ischial wounds  -2% viscous lidocaine applied topically to wound bed for approximately 5 minutes prior to debridement  -Curette used to debride both wound beds.  Excisional debridement was performed to remove devitalized tissue until healthy, bleeding tissue was visualized.  Total area debrided was 22.84 cm², including into undermined areas of wounds.  Tissue debrided into the muscle / fascia layer.    -Bleeding controlled with manual pressure and silver nitrate.  -Wound care completed by wound RN, refer to flowsheet  -Patient tolerated the procedure well, without c/o pain or discomfort.    Pertinent Labs and Diagnostics:    Labs:     A1c: No results found for: \"HBA1C\"     [facility] results, 7/1/2022 (under media tab)    CRP 13    ESR 31      IMAGING:     X-ray left tib-fib ordered 2/16/2024 through [facility]    12/11/2023-CT of abdomen pelvis with contrast  IMPRESSION:   1.  Right ischial decubitus ulcer extending to bone with soft tissue gas tracking along the right perineum. Appearance suggesting osteomyelitis, consider component of necrotizing fasciitis as clinically appropriate.  2.  Small pericardial effusion  3.  Left adrenal nodule, density on prior noncontrast CT demonstrates adenoma.  4.  Hepatomegaly  5.  Enlarged prostate, workup and evaluation for causes of prostate enlargement recommended as clinically appropriate.  6.  Atherosclerosis and atherosclerotic coronary artery disease    12/15/2023-bone scan of left foot  IMPRESSION:     1.  Mild increased activity in the LEFT 1st and 3rd toes on blood pool and delayed images possibly " indicating inflammation/infection.  2.  No significant blood flow asymmetry.          VASCULAR STUDIES: No results found.    LAST  WOUND CULTURE:   Lab Results   Component Value Date/Time    CULTRSULT Light growth mixed enteric ade. (A) 07/09/2025 04:00 PM    CULTRSULT Beta Streptococcus Group G  Moderate growth   (A) 07/09/2025 04:00 PM      PATHOLOGY  2/17/2023-bone fragment extracted from left lower extremity wound\\  FINAL DIAGNOSIS:     A. Left leg bone fragment at base of chronic wound:          Extensively degenerated fibrocartilaginous tissue with a rim of           fibrinopurulent debris          Correlate with culture findings            Comment: While no residual intact bone is identified, these           findings are suggestive of adjacent osteomyelitis.      ASSESSMENT AND PLAN:     1. Pressure injury of right ischium, stage 4 (HCC)  Comments: Abscess and OM found on CT during hospitalization in December 2023.  Patient underwent I&D with VAC placement.  IV antibiotics through 1/22/2024.    7/30/2025: Measures smaller, track still probes to bone  - Excisional debridement of nonviable tissue from wounds in clinic today, medically necessary to promote wound healing  - Will defer application of VAC to wound due to lack of depth.  -Patient to return to clinic weekly for assessment, debridement, and dressing change.    -Home health to change dressing 2 times per week in between clinic visits.    -Patient is very well versed on pressure relief measures, has adequate surface on bed, alternating low air loss.    Wound care: Hydrofiber silver, superabsorbent foam, Silicone foam    2. Pressure injury of left ischium, stage 4 (HCC)    7/3/2025: Measures about the same, tends to be positional however.  - Excisional debridement of wound in clinic today, medically necessary to promote wound healing.  -Wound culture collected from this wound after debridement on previous visit due to heavy drainage and odor.  Culture  positive for strep group G. Patient taking Abx Augmentin twice daily x 14 days.  He should be completing these today or tomorrow  - Patient to return to clinic weekly for assessment, debridement, and VAC dressing change  - Continue VAC to this wound, change 3 times per week  -Home health to change dressing 2 times per week in between clinic visits.    -Patient has new cushion in his wheelchair, see above  -Patient is very well versed on pressure relief measures, has adequate surface on bed, alternating low air loss.  -Patient has been seen by mobile wound care physician, Dr. Baez.  He is recommending wound VAC and biologic.  We had originally thought Dr. Dunlap would do surgery first.  It appears that is not in the plan.  Patient would like to continue his treatment at [facility]  Wound care: NPWT -125mmHg continuous with black foam    3. Other acute osteomyelitis, other site (LTAC, located [facility])    7/30/2025: Bone fragments removed during clinic visit in June 2024 were sent for pathology, polymicrobial, most concerning was an MDR ESBL Proteus.   -Patient completed IV antibiotics.  No further antibiotics for treatment of OM at this time per ID  -Patient understands he has a very low threshold for infection/sepsis.  He understands he is to go to the emergency room immediately if he begins to experience fevers, chills, nausea or vomiting, or if he notices radiating erythema or purulent drainage from his wounds.  - Consider referral back to ID if wounds remain stalled or if they deteriorate    4. Quadriplegia, C5-C7 incomplete ([facility], located [facility])  Comments: Complicating factor.  Impaired mobility and sensation  -Patient is still spending 7-8 hours/day up in his wheelchair and knows to reposition frequently.  Wears heel float boots bilaterally at all times  - Patient has new custom wheelchair seat. He had refitting and appears he was not sitting back in chair enough which was causing undue pressure to IT. He is more aware of the correct positioning in  chair.    5.  Laceration of lesser toe of right foot without foreign body present or damage to nail, subsequent encounter    7/3/2025: Patient presented to the ED on 7/4 with laceration to his right foot after hitting his foot against the door jam while going into his room.  Lacerations between toes 3 and 4 and 2 and 3 were sutured  - Resolved  Wound Care: Open to air.    Please note that this note may have been created using voice recognition software. I have worked with technical experts from [facility] to optimize the interface.  I have made every reasonable attempt to correct obvious errors, but there may be errors of grammar and possibly content that I did not discover before finalizing the note.

## 2025-07-31 NOTE — PROGRESS NOTES
S/w patient and he stated he purchased a new BP cuff. Will do one week BP log. This RN will f/u in one week.

## 2025-08-06 ENCOUNTER — OFFICE VISIT (OUTPATIENT)
Dept: WOUND CARE | Facility: MEDICAL CENTER | Age: 75
End: 2025-08-06
Payer: MEDICARE

## 2025-08-06 DIAGNOSIS — L89.314 PRESSURE INJURY OF RIGHT ISCHIUM, STAGE 4 (HCC): Primary | ICD-10-CM

## 2025-08-06 DIAGNOSIS — L89.324 PRESSURE INJURY OF LEFT ISCHIUM, STAGE 4 (HCC): ICD-10-CM

## 2025-08-06 DIAGNOSIS — M86.18 OTHER ACUTE OSTEOMYELITIS, OTHER SITE (HCC): ICD-10-CM

## 2025-08-06 DIAGNOSIS — G82.54 QUADRIPLEGIA, C5-C7, INCOMPLETE (HCC): ICD-10-CM

## 2025-08-06 PROCEDURE — 11043 DBRDMT MUSC&/FSCA 1ST 20/<: CPT | Performed by: NURSE PRACTITIONER

## 2025-08-06 PROCEDURE — 11046 DBRDMT MUSC&/FSCA EA ADDL: CPT | Performed by: NURSE PRACTITIONER

## 2025-08-06 PROCEDURE — 99214 OFFICE O/P EST MOD 30 MIN: CPT

## 2025-08-06 PROCEDURE — 11046 DBRDMT MUSC&/FSCA EA ADDL: CPT

## 2025-08-06 PROCEDURE — 11043 DBRDMT MUSC&/FSCA 1ST 20/<: CPT

## 2025-08-11 ENCOUNTER — HOSPITAL ENCOUNTER (INPATIENT)
Facility: MEDICAL CENTER | Age: 75
LOS: 4 days | DRG: 689 | End: 2025-08-15
Attending: EMERGENCY MEDICINE | Admitting: HOSPITALIST
Payer: MEDICARE

## 2025-08-11 ENCOUNTER — APPOINTMENT (OUTPATIENT)
Dept: RADIOLOGY | Facility: MEDICAL CENTER | Age: 75
DRG: 689 | End: 2025-08-11
Attending: EMERGENCY MEDICINE
Payer: MEDICARE

## 2025-08-11 DIAGNOSIS — Z93.3 COLOSTOMY IN PLACE (HCC): ICD-10-CM

## 2025-08-11 DIAGNOSIS — Z16.24 MULTIPLE DRUG RESISTANT ORGANISM (MDRO) CULTURE POSITIVE: ICD-10-CM

## 2025-08-11 DIAGNOSIS — L89.90 PRESSURE ULCERS OF SKIN OF MULTIPLE TOPOGRAPHIC SITES: ICD-10-CM

## 2025-08-11 DIAGNOSIS — N39.0 URINARY TRACT INFECTION WITHOUT HEMATURIA, SITE UNSPECIFIED: Primary | ICD-10-CM

## 2025-08-11 LAB
ALBUMIN SERPL BCP-MCNC: 3.1 G/DL (ref 3.2–4.9)
ALBUMIN/GLOB SERPL: 0.9 G/DL
ALP SERPL-CCNC: 66 U/L (ref 30–99)
ALT SERPL-CCNC: 8 U/L (ref 2–50)
ANION GAP SERPL CALC-SCNC: 10 MMOL/L (ref 7–16)
APPEARANCE UR: CLEAR
AST SERPL-CCNC: 12 U/L (ref 12–45)
BACTERIA #/AREA URNS HPF: ABNORMAL /HPF
BASOPHILS # BLD AUTO: 0.1 % (ref 0–1.8)
BASOPHILS # BLD: 0.01 K/UL (ref 0–0.12)
BILIRUB SERPL-MCNC: 0.5 MG/DL (ref 0.1–1.5)
BILIRUB UR QL STRIP.AUTO: NEGATIVE
BUN SERPL-MCNC: 15 MG/DL (ref 8–22)
CALCIUM ALBUM COR SERPL-MCNC: 9.4 MG/DL (ref 8.5–10.5)
CALCIUM SERPL-MCNC: 8.7 MG/DL (ref 8.5–10.5)
CASTS URNS QL MICRO: ABNORMAL /LPF (ref 0–2)
CHLORIDE SERPL-SCNC: 104 MMOL/L (ref 96–112)
CO2 SERPL-SCNC: 23 MMOL/L (ref 20–33)
COLOR UR: YELLOW
CREAT SERPL-MCNC: 0.58 MG/DL (ref 0.5–1.4)
EOSINOPHIL # BLD AUTO: 0.1 K/UL (ref 0–0.51)
EOSINOPHIL NFR BLD: 1.3 % (ref 0–6.9)
EPITHELIAL CELLS 1715: ABNORMAL /HPF (ref 0–5)
ERYTHROCYTE [DISTWIDTH] IN BLOOD BY AUTOMATED COUNT: 56 FL (ref 35.9–50)
GFR SERPLBLD CREATININE-BSD FMLA CKD-EPI: 102 ML/MIN/1.73 M 2
GLOBULIN SER CALC-MCNC: 3.6 G/DL (ref 1.9–3.5)
GLUCOSE SERPL-MCNC: 111 MG/DL (ref 65–99)
GLUCOSE UR STRIP.AUTO-MCNC: NEGATIVE MG/DL
HCT VFR BLD AUTO: 35.6 % (ref 42–52)
HGB BLD-MCNC: 11.7 G/DL (ref 14–18)
IMM GRANULOCYTES # BLD AUTO: 0.05 K/UL (ref 0–0.11)
IMM GRANULOCYTES NFR BLD AUTO: 0.7 % (ref 0–0.9)
KETONES UR STRIP.AUTO-MCNC: NEGATIVE MG/DL
LACTATE SERPL-SCNC: 0.6 MMOL/L (ref 0.5–2)
LEUKOCYTE ESTERASE UR QL STRIP.AUTO: ABNORMAL
LYMPHOCYTES # BLD AUTO: 1.17 K/UL (ref 1–4.8)
LYMPHOCYTES NFR BLD: 15.7 % (ref 22–41)
MCH RBC QN AUTO: 33.9 PG (ref 27–33)
MCHC RBC AUTO-ENTMCNC: 32.9 G/DL (ref 32.3–36.5)
MCV RBC AUTO: 103.2 FL (ref 81.4–97.8)
MICRO URNS: ABNORMAL
MONOCYTES # BLD AUTO: 0.83 K/UL (ref 0–0.85)
MONOCYTES NFR BLD AUTO: 11.1 % (ref 0–13.4)
NEUTROPHILS # BLD AUTO: 5.3 K/UL (ref 1.82–7.42)
NEUTROPHILS NFR BLD: 71.1 % (ref 44–72)
NITRITE UR QL STRIP.AUTO: POSITIVE
NRBC # BLD AUTO: 0 K/UL
NRBC BLD-RTO: 0 /100 WBC (ref 0–0.2)
PH UR STRIP.AUTO: 8 [PH] (ref 5–8)
PLATELET # BLD AUTO: 180 K/UL (ref 164–446)
PMV BLD AUTO: 8.9 FL (ref 9–12.9)
POTASSIUM SERPL-SCNC: 4 MMOL/L (ref 3.6–5.5)
PROT SERPL-MCNC: 6.7 G/DL (ref 6–8.2)
PROT UR QL STRIP: 30 MG/DL
RBC # BLD AUTO: 3.45 M/UL (ref 4.7–6.1)
RBC # URNS HPF: ABNORMAL /HPF
RBC UR QL AUTO: ABNORMAL
SODIUM SERPL-SCNC: 137 MMOL/L (ref 135–145)
SP GR UR STRIP.AUTO: 1.01
UROBILINOGEN UR STRIP.AUTO-MCNC: 0.2 EU/DL
WBC # BLD AUTO: 7.5 K/UL (ref 4.8–10.8)
WBC #/AREA URNS HPF: ABNORMAL /HPF

## 2025-08-11 PROCEDURE — 81001 URINALYSIS AUTO W/SCOPE: CPT

## 2025-08-11 PROCEDURE — 87077 CULTURE AEROBIC IDENTIFY: CPT | Mod: 91

## 2025-08-11 PROCEDURE — 87086 URINE CULTURE/COLONY COUNT: CPT

## 2025-08-11 PROCEDURE — 36415 COLL VENOUS BLD VENIPUNCTURE: CPT

## 2025-08-11 PROCEDURE — 96365 THER/PROPH/DIAG IV INF INIT: CPT

## 2025-08-11 PROCEDURE — 71045 X-RAY EXAM CHEST 1 VIEW: CPT

## 2025-08-11 PROCEDURE — 83605 ASSAY OF LACTIC ACID: CPT

## 2025-08-11 PROCEDURE — 87040 BLOOD CULTURE FOR BACTERIA: CPT | Mod: 91

## 2025-08-11 PROCEDURE — 96366 THER/PROPH/DIAG IV INF ADDON: CPT

## 2025-08-11 PROCEDURE — 770006 HCHG ROOM/CARE - MED/SURG/GYN SEMI*

## 2025-08-11 PROCEDURE — 80053 COMPREHEN METABOLIC PANEL: CPT

## 2025-08-11 PROCEDURE — 99223 1ST HOSP IP/OBS HIGH 75: CPT | Mod: AI | Performed by: HOSPITALIST

## 2025-08-11 PROCEDURE — 87186 SC STD MICRODIL/AGAR DIL: CPT | Mod: 91

## 2025-08-11 PROCEDURE — 85025 COMPLETE CBC W/AUTO DIFF WBC: CPT

## 2025-08-11 PROCEDURE — 99285 EMERGENCY DEPT VISIT HI MDM: CPT

## 2025-08-11 RX ORDER — ASPIRIN 81 MG/1
81 TABLET ORAL DAILY
Status: DISCONTINUED | OUTPATIENT
Start: 2025-08-12 | End: 2025-08-15 | Stop reason: HOSPADM

## 2025-08-11 RX ORDER — AMLODIPINE BESYLATE 5 MG/1
10 TABLET ORAL DAILY
Status: DISCONTINUED | OUTPATIENT
Start: 2025-08-12 | End: 2025-08-15 | Stop reason: HOSPADM

## 2025-08-11 RX ORDER — LOSARTAN POTASSIUM 50 MG/1
50 TABLET ORAL DAILY
Status: DISCONTINUED | OUTPATIENT
Start: 2025-08-12 | End: 2025-08-12

## 2025-08-11 RX ORDER — LANOLIN ALCOHOL/MO/W.PET/CERES
400 CREAM (GRAM) TOPICAL EVERY MORNING
Status: DISCONTINUED | OUTPATIENT
Start: 2025-08-12 | End: 2025-08-15 | Stop reason: HOSPADM

## 2025-08-11 RX ORDER — SODIUM CHLORIDE, SODIUM LACTATE, POTASSIUM CHLORIDE, AND CALCIUM CHLORIDE .6; .31; .03; .02 G/100ML; G/100ML; G/100ML; G/100ML
500 INJECTION, SOLUTION INTRAVENOUS
Status: DISCONTINUED | OUTPATIENT
Start: 2025-08-11 | End: 2025-08-15 | Stop reason: HOSPADM

## 2025-08-11 RX ORDER — POLYETHYLENE GLYCOL 3350 17 G/17G
1 POWDER, FOR SOLUTION ORAL
Status: DISCONTINUED | OUTPATIENT
Start: 2025-08-11 | End: 2025-08-12

## 2025-08-11 RX ORDER — BACLOFEN 10 MG/1
20 TABLET ORAL
Status: DISCONTINUED | OUTPATIENT
Start: 2025-08-12 | End: 2025-08-15 | Stop reason: HOSPADM

## 2025-08-11 RX ORDER — ONDANSETRON 2 MG/ML
4 INJECTION INTRAMUSCULAR; INTRAVENOUS EVERY 4 HOURS PRN
Status: DISCONTINUED | OUTPATIENT
Start: 2025-08-11 | End: 2025-08-15 | Stop reason: HOSPADM

## 2025-08-11 RX ORDER — ACETAMINOPHEN 325 MG/1
650 TABLET ORAL EVERY 6 HOURS PRN
Status: DISCONTINUED | OUTPATIENT
Start: 2025-08-11 | End: 2025-08-15 | Stop reason: HOSPADM

## 2025-08-11 RX ORDER — METHENAMINE HIPPURATE 1000 MG/1
1 TABLET ORAL 2 TIMES DAILY
Status: DISCONTINUED | OUTPATIENT
Start: 2025-08-12 | End: 2025-08-15 | Stop reason: HOSPADM

## 2025-08-11 RX ORDER — ONDANSETRON 4 MG/1
4 TABLET, ORALLY DISINTEGRATING ORAL EVERY 4 HOURS PRN
Status: DISCONTINUED | OUTPATIENT
Start: 2025-08-11 | End: 2025-08-15 | Stop reason: HOSPADM

## 2025-08-11 RX ORDER — SOLIFENACIN SUCCINATE 10 MG/1
10 TABLET, FILM COATED ORAL EVERY EVENING
Status: DISCONTINUED | OUTPATIENT
Start: 2025-08-12 | End: 2025-08-12

## 2025-08-11 RX ORDER — AMOXICILLIN 250 MG
2 CAPSULE ORAL EVERY EVENING
Status: DISCONTINUED | OUTPATIENT
Start: 2025-08-12 | End: 2025-08-12

## 2025-08-11 RX ORDER — OMEPRAZOLE 20 MG/1
20 CAPSULE, DELAYED RELEASE ORAL 2 TIMES DAILY
Status: DISCONTINUED | OUTPATIENT
Start: 2025-08-12 | End: 2025-08-15 | Stop reason: HOSPADM

## 2025-08-11 ASSESSMENT — FIBROSIS 4 INDEX: FIB4 SCORE: 2.53

## 2025-08-12 PROBLEM — Z93.3 PRESENCE OF COLOSTOMY (HCC): Status: RESOLVED | Noted: 2022-01-31 | Resolved: 2025-08-12

## 2025-08-12 PROBLEM — K21.9 GASTROESOPHAGEAL REFLUX DISEASE WITHOUT ESOPHAGITIS: Status: ACTIVE | Noted: 2025-08-12

## 2025-08-12 PROBLEM — R73.9 HYPERGLYCEMIA: Status: RESOLVED | Noted: 2023-12-12 | Resolved: 2025-08-12

## 2025-08-12 LAB
ANION GAP SERPL CALC-SCNC: 10 MMOL/L (ref 7–16)
BUN SERPL-MCNC: 13 MG/DL (ref 8–22)
CALCIUM SERPL-MCNC: 8.5 MG/DL (ref 8.5–10.5)
CHLORIDE SERPL-SCNC: 109 MMOL/L (ref 96–112)
CO2 SERPL-SCNC: 22 MMOL/L (ref 20–33)
CREAT SERPL-MCNC: 0.51 MG/DL (ref 0.5–1.4)
ERYTHROCYTE [DISTWIDTH] IN BLOOD BY AUTOMATED COUNT: 55.3 FL (ref 35.9–50)
GFR SERPLBLD CREATININE-BSD FMLA CKD-EPI: 106 ML/MIN/1.73 M 2
GLUCOSE SERPL-MCNC: 93 MG/DL (ref 65–99)
HCT VFR BLD AUTO: 34.9 % (ref 42–52)
HGB BLD-MCNC: 11.5 G/DL (ref 14–18)
INR PPP: 1.19 (ref 0.87–1.13)
MCH RBC QN AUTO: 34.1 PG (ref 27–33)
MCHC RBC AUTO-ENTMCNC: 33 G/DL (ref 32.3–36.5)
MCV RBC AUTO: 103.6 FL (ref 81.4–97.8)
PLATELET # BLD AUTO: 157 K/UL (ref 164–446)
PMV BLD AUTO: 8.9 FL (ref 9–12.9)
POTASSIUM SERPL-SCNC: 3.6 MMOL/L (ref 3.6–5.5)
PROTHROMBIN TIME: 15.4 SEC (ref 12–14.6)
RBC # BLD AUTO: 3.37 M/UL (ref 4.7–6.1)
SODIUM SERPL-SCNC: 141 MMOL/L (ref 135–145)
WBC # BLD AUTO: 7.3 K/UL (ref 4.8–10.8)

## 2025-08-12 PROCEDURE — 700111 HCHG RX REV CODE 636 W/ 250 OVERRIDE (IP): Performed by: HOSPITALIST

## 2025-08-12 PROCEDURE — 700105 HCHG RX REV CODE 258: Performed by: HOSPITALIST

## 2025-08-12 PROCEDURE — 700102 HCHG RX REV CODE 250 W/ 637 OVERRIDE(OP): Performed by: STUDENT IN AN ORGANIZED HEALTH CARE EDUCATION/TRAINING PROGRAM

## 2025-08-12 PROCEDURE — 770001 HCHG ROOM/CARE - MED/SURG/GYN PRIV*

## 2025-08-12 PROCEDURE — A9270 NON-COVERED ITEM OR SERVICE: HCPCS | Performed by: STUDENT IN AN ORGANIZED HEALTH CARE EDUCATION/TRAINING PROGRAM

## 2025-08-12 PROCEDURE — 36415 COLL VENOUS BLD VENIPUNCTURE: CPT

## 2025-08-12 PROCEDURE — 700105 HCHG RX REV CODE 258: Performed by: EMERGENCY MEDICINE

## 2025-08-12 PROCEDURE — 99232 SBSQ HOSP IP/OBS MODERATE 35: CPT | Performed by: INTERNAL MEDICINE

## 2025-08-12 PROCEDURE — 302106 OSTOMY POWDER: Performed by: INTERNAL MEDICINE

## 2025-08-12 PROCEDURE — 85610 PROTHROMBIN TIME: CPT

## 2025-08-12 PROCEDURE — 96365 THER/PROPH/DIAG IV INF INIT: CPT

## 2025-08-12 PROCEDURE — A9270 NON-COVERED ITEM OR SERVICE: HCPCS | Performed by: HOSPITALIST

## 2025-08-12 PROCEDURE — 80048 BASIC METABOLIC PNL TOTAL CA: CPT

## 2025-08-12 PROCEDURE — 96366 THER/PROPH/DIAG IV INF ADDON: CPT

## 2025-08-12 PROCEDURE — 85027 COMPLETE CBC AUTOMATED: CPT

## 2025-08-12 PROCEDURE — 700111 HCHG RX REV CODE 636 W/ 250 OVERRIDE (IP): Performed by: EMERGENCY MEDICINE

## 2025-08-12 PROCEDURE — 700102 HCHG RX REV CODE 250 W/ 637 OVERRIDE(OP): Performed by: HOSPITALIST

## 2025-08-12 RX ORDER — POLYETHYLENE GLYCOL 3350 17 G/17G
1 POWDER, FOR SOLUTION ORAL DAILY
Status: DISCONTINUED | OUTPATIENT
Start: 2025-08-12 | End: 2025-08-15 | Stop reason: HOSPADM

## 2025-08-12 RX ORDER — SENNOSIDES 8.6 MG/1
8.6 TABLET ORAL NIGHTLY
Status: DISCONTINUED | OUTPATIENT
Start: 2025-08-12 | End: 2025-08-15 | Stop reason: HOSPADM

## 2025-08-12 RX ORDER — SENNOSIDES 8.6 MG/1
8.6 TABLET ORAL DAILY
Status: DISCONTINUED | OUTPATIENT
Start: 2025-08-13 | End: 2025-08-15 | Stop reason: HOSPADM

## 2025-08-12 RX ORDER — DOCUSATE SODIUM 100 MG/1
100 CAPSULE, LIQUID FILLED ORAL 2 TIMES DAILY
Status: DISCONTINUED | OUTPATIENT
Start: 2025-08-12 | End: 2025-08-15 | Stop reason: HOSPADM

## 2025-08-12 RX ORDER — LOSARTAN POTASSIUM 50 MG/1
50 TABLET ORAL NIGHTLY
Status: DISCONTINUED | OUTPATIENT
Start: 2025-08-13 | End: 2025-08-12

## 2025-08-12 RX ORDER — LOSARTAN POTASSIUM 50 MG/1
50 TABLET ORAL NIGHTLY
Status: DISCONTINUED | OUTPATIENT
Start: 2025-08-13 | End: 2025-08-15 | Stop reason: HOSPADM

## 2025-08-12 RX ORDER — SENNOSIDES 8.6 MG/1
8.6 TABLET ORAL DAILY
Status: DISCONTINUED | OUTPATIENT
Start: 2025-08-12 | End: 2025-08-12

## 2025-08-12 RX ORDER — CITRIC ACID, GLUCONOLACTONE AND MAGNESIUM CARBONATE 6.602; .198; 3.268 G/100ML; G/100ML; G/100ML
1 SOLUTION IRRIGATION SEE ADMIN INSTRUCTIONS
COMMUNITY

## 2025-08-12 RX ADMIN — METHENAMINE HIPPURATE 1 G: 1 TABLET ORAL at 07:48

## 2025-08-12 RX ADMIN — OMEPRAZOLE 20 MG: 20 CAPSULE, DELAYED RELEASE ORAL at 06:06

## 2025-08-12 RX ADMIN — BACLOFEN 20 MG: 10 TABLET ORAL at 07:00

## 2025-08-12 RX ADMIN — MEROPENEM 500 MG: 500 INJECTION, POWDER, FOR SOLUTION INTRAVENOUS at 06:08

## 2025-08-12 RX ADMIN — SENNOSIDES 8.6 MG: 8.6 TABLET, FILM COATED ORAL at 07:48

## 2025-08-12 RX ADMIN — ASPIRIN 81 MG: 81 TABLET, COATED ORAL at 06:06

## 2025-08-12 RX ADMIN — Medication 10 MG: at 23:42

## 2025-08-12 RX ADMIN — METHENAMINE HIPPURATE 1 G: 1 TABLET ORAL at 17:53

## 2025-08-12 RX ADMIN — MEROPENEM 500 MG: 500 INJECTION, POWDER, FOR SOLUTION INTRAVENOUS at 00:42

## 2025-08-12 RX ADMIN — SENNOSIDES 8.6 MG: 8.6 TABLET, FILM COATED ORAL at 23:42

## 2025-08-12 RX ADMIN — OMEPRAZOLE 20 MG: 20 CAPSULE, DELAYED RELEASE ORAL at 17:12

## 2025-08-12 RX ADMIN — BACLOFEN 20 MG: 10 TABLET ORAL at 23:42

## 2025-08-12 RX ADMIN — MEROPENEM 500 MG: 500 INJECTION, POWDER, FOR SOLUTION INTRAVENOUS at 17:16

## 2025-08-12 RX ADMIN — MEROPENEM 500 MG: 500 INJECTION, POWDER, FOR SOLUTION INTRAVENOUS at 23:36

## 2025-08-12 RX ADMIN — AMLODIPINE BESYLATE 10 MG: 5 TABLET ORAL at 06:06

## 2025-08-12 RX ADMIN — POLYETHYLENE GLYCOL 3350 1 PACKET: 17 POWDER, FOR SOLUTION ORAL at 06:26

## 2025-08-12 RX ADMIN — MEROPENEM 500 MG: 500 INJECTION, POWDER, FOR SOLUTION INTRAVENOUS at 12:13

## 2025-08-12 RX ADMIN — DOCUSATE SODIUM 100 MG: 100 CAPSULE, LIQUID FILLED ORAL at 07:48

## 2025-08-12 RX ADMIN — BACLOFEN 20 MG: 10 TABLET ORAL at 12:11

## 2025-08-12 RX ADMIN — BACLOFEN 20 MG: 10 TABLET ORAL at 17:12

## 2025-08-12 RX ADMIN — LOSARTAN POTASSIUM 50 MG: 50 TABLET, FILM COATED ORAL at 23:41

## 2025-08-12 RX ADMIN — Medication 400 MG: at 06:06

## 2025-08-12 ASSESSMENT — LIFESTYLE VARIABLES
HOW MANY TIMES IN THE PAST YEAR HAVE YOU HAD 5 OR MORE DRINKS IN A DAY: 0
HAVE YOU EVER FELT YOU SHOULD CUT DOWN ON YOUR DRINKING: NO
TOTAL SCORE: 0
EVER HAD A DRINK FIRST THING IN THE MORNING TO STEADY YOUR NERVES TO GET RID OF A HANGOVER: NO
TOTAL SCORE: 0
EVER FELT BAD OR GUILTY ABOUT YOUR DRINKING: NO
ALCOHOL_USE: YES
HAVE PEOPLE ANNOYED YOU BY CRITICIZING YOUR DRINKING: NO
AVERAGE NUMBER OF DAYS PER WEEK YOU HAVE A DRINK CONTAINING ALCOHOL: 7
TOTAL SCORE: 0
ON A TYPICAL DAY WHEN YOU DRINK ALCOHOL HOW MANY DRINKS DO YOU HAVE: 2
CONSUMPTION TOTAL: NEGATIVE

## 2025-08-12 ASSESSMENT — ENCOUNTER SYMPTOMS
NAUSEA: 0
GASTROINTESTINAL NEGATIVE: 1
NEUROLOGICAL NEGATIVE: 1
FEVER: 1
EYES NEGATIVE: 1
COUGH: 0
PSYCHIATRIC NEGATIVE: 1
BRUISES/BLEEDS EASILY: 0
WEAKNESS: 0
PALPITATIONS: 0
DIZZINESS: 0
CARDIOVASCULAR NEGATIVE: 1
DOUBLE VISION: 0
NECK PAIN: 0
INSOMNIA: 0
DEPRESSION: 0
SHORTNESS OF BREATH: 0
VOMITING: 0
CHILLS: 1
HEADACHES: 0
BLURRED VISION: 0
MUSCULOSKELETAL NEGATIVE: 1
SORE THROAT: 0
RESPIRATORY NEGATIVE: 1
MYALGIAS: 0

## 2025-08-12 ASSESSMENT — COGNITIVE AND FUNCTIONAL STATUS - GENERAL
WALKING IN HOSPITAL ROOM: TOTAL
HELP NEEDED FOR BATHING: TOTAL
DRESSING REGULAR LOWER BODY CLOTHING: TOTAL
SUGGESTED CMS G CODE MODIFIER DAILY ACTIVITY: CL
TOILETING: TOTAL
MOVING TO AND FROM BED TO CHAIR: A LOT
DAILY ACTIVITIY SCORE: 12
TURNING FROM BACK TO SIDE WHILE IN FLAT BAD: A LOT
SUGGESTED CMS G CODE MODIFIER MOBILITY: CM
STANDING UP FROM CHAIR USING ARMS: TOTAL
DRESSING REGULAR UPPER BODY CLOTHING: TOTAL
MOVING FROM LYING ON BACK TO SITTING ON SIDE OF FLAT BED: A LOT
MOBILITY SCORE: 9
CLIMB 3 TO 5 STEPS WITH RAILING: TOTAL

## 2025-08-12 ASSESSMENT — FIBROSIS 4 INDEX: FIB4 SCORE: 2.03

## 2025-08-13 LAB
ANION GAP SERPL CALC-SCNC: 10 MMOL/L (ref 7–16)
BACTERIA UR CULT: NORMAL
BUN SERPL-MCNC: 12 MG/DL (ref 8–22)
CALCIUM SERPL-MCNC: 8.2 MG/DL (ref 8.5–10.5)
CHLORIDE SERPL-SCNC: 107 MMOL/L (ref 96–112)
CO2 SERPL-SCNC: 22 MMOL/L (ref 20–33)
CREAT SERPL-MCNC: 0.47 MG/DL (ref 0.5–1.4)
ERYTHROCYTE [DISTWIDTH] IN BLOOD BY AUTOMATED COUNT: 55.9 FL (ref 35.9–50)
GFR SERPLBLD CREATININE-BSD FMLA CKD-EPI: 108 ML/MIN/1.73 M 2
GLUCOSE SERPL-MCNC: 105 MG/DL (ref 65–99)
HCT VFR BLD AUTO: 36.1 % (ref 42–52)
HGB BLD-MCNC: 11.7 G/DL (ref 14–18)
MCH RBC QN AUTO: 33.7 PG (ref 27–33)
MCHC RBC AUTO-ENTMCNC: 32.4 G/DL (ref 32.3–36.5)
MCV RBC AUTO: 104 FL (ref 81.4–97.8)
PLATELET # BLD AUTO: 151 K/UL (ref 164–446)
PMV BLD AUTO: 8.8 FL (ref 9–12.9)
POTASSIUM SERPL-SCNC: 3.8 MMOL/L (ref 3.6–5.5)
RBC # BLD AUTO: 3.47 M/UL (ref 4.7–6.1)
SIGNIFICANT IND 70042: NORMAL
SITE SITE: NORMAL
SODIUM SERPL-SCNC: 139 MMOL/L (ref 135–145)
SOURCE SOURCE: NORMAL
WBC # BLD AUTO: 7.1 K/UL (ref 4.8–10.8)

## 2025-08-13 PROCEDURE — 99233 SBSQ HOSP IP/OBS HIGH 50: CPT | Performed by: INTERNAL MEDICINE

## 2025-08-13 PROCEDURE — 700111 HCHG RX REV CODE 636 W/ 250 OVERRIDE (IP): Performed by: HOSPITALIST

## 2025-08-13 PROCEDURE — 700105 HCHG RX REV CODE 258: Performed by: HOSPITALIST

## 2025-08-13 PROCEDURE — 85027 COMPLETE CBC AUTOMATED: CPT

## 2025-08-13 PROCEDURE — 700102 HCHG RX REV CODE 250 W/ 637 OVERRIDE(OP): Performed by: STUDENT IN AN ORGANIZED HEALTH CARE EDUCATION/TRAINING PROGRAM

## 2025-08-13 PROCEDURE — 80048 BASIC METABOLIC PNL TOTAL CA: CPT

## 2025-08-13 PROCEDURE — A9270 NON-COVERED ITEM OR SERVICE: HCPCS | Performed by: HOSPITALIST

## 2025-08-13 PROCEDURE — A9270 NON-COVERED ITEM OR SERVICE: HCPCS | Performed by: STUDENT IN AN ORGANIZED HEALTH CARE EDUCATION/TRAINING PROGRAM

## 2025-08-13 PROCEDURE — 94760 N-INVAS EAR/PLS OXIMETRY 1: CPT

## 2025-08-13 PROCEDURE — 700102 HCHG RX REV CODE 250 W/ 637 OVERRIDE(OP): Performed by: HOSPITALIST

## 2025-08-13 PROCEDURE — 36415 COLL VENOUS BLD VENIPUNCTURE: CPT

## 2025-08-13 PROCEDURE — 770001 HCHG ROOM/CARE - MED/SURG/GYN PRIV*

## 2025-08-13 RX ORDER — OXYBUTYNIN CHLORIDE 5 MG/1
10 TABLET, EXTENDED RELEASE ORAL DAILY
Status: DISCONTINUED | OUTPATIENT
Start: 2025-08-14 | End: 2025-08-15 | Stop reason: HOSPADM

## 2025-08-13 RX ADMIN — BACLOFEN 20 MG: 10 TABLET ORAL at 12:30

## 2025-08-13 RX ADMIN — SENNOSIDES 8.6 MG: 8.6 TABLET, FILM COATED ORAL at 20:11

## 2025-08-13 RX ADMIN — Medication 10 MG: at 23:45

## 2025-08-13 RX ADMIN — BACLOFEN 20 MG: 10 TABLET ORAL at 20:10

## 2025-08-13 RX ADMIN — VIBEGRON 75 MG: 75 TABLET, FILM COATED ORAL at 06:01

## 2025-08-13 RX ADMIN — SENNOSIDES 8.6 MG: 8.6 TABLET, FILM COATED ORAL at 06:01

## 2025-08-13 RX ADMIN — OMEPRAZOLE 20 MG: 20 CAPSULE, DELAYED RELEASE ORAL at 06:01

## 2025-08-13 RX ADMIN — BACLOFEN 20 MG: 10 TABLET ORAL at 06:01

## 2025-08-13 RX ADMIN — MEROPENEM 500 MG: 500 INJECTION, POWDER, FOR SOLUTION INTRAVENOUS at 06:07

## 2025-08-13 RX ADMIN — DOCUSATE SODIUM 100 MG: 100 CAPSULE, LIQUID FILLED ORAL at 06:00

## 2025-08-13 RX ADMIN — AMLODIPINE BESYLATE 10 MG: 5 TABLET ORAL at 06:01

## 2025-08-13 RX ADMIN — MEROPENEM 500 MG: 500 INJECTION, POWDER, FOR SOLUTION INTRAVENOUS at 16:52

## 2025-08-13 RX ADMIN — BACLOFEN 20 MG: 10 TABLET ORAL at 16:48

## 2025-08-13 RX ADMIN — MEROPENEM 500 MG: 500 INJECTION, POWDER, FOR SOLUTION INTRAVENOUS at 23:42

## 2025-08-13 RX ADMIN — Medication 400 MG: at 06:00

## 2025-08-13 RX ADMIN — OMEPRAZOLE 20 MG: 20 CAPSULE, DELAYED RELEASE ORAL at 16:48

## 2025-08-13 RX ADMIN — MEROPENEM 500 MG: 500 INJECTION, POWDER, FOR SOLUTION INTRAVENOUS at 12:31

## 2025-08-13 RX ADMIN — ASPIRIN 81 MG: 81 TABLET, COATED ORAL at 06:00

## 2025-08-13 RX ADMIN — METHENAMINE HIPPURATE 1 G: 1 TABLET ORAL at 06:01

## 2025-08-13 RX ADMIN — DOCUSATE SODIUM 100 MG: 100 CAPSULE, LIQUID FILLED ORAL at 20:11

## 2025-08-13 RX ADMIN — POLYETHYLENE GLYCOL 3350 1 PACKET: 17 POWDER, FOR SOLUTION ORAL at 06:00

## 2025-08-13 ASSESSMENT — ENCOUNTER SYMPTOMS
PSYCHIATRIC NEGATIVE: 1
GASTROINTESTINAL NEGATIVE: 1
MUSCULOSKELETAL NEGATIVE: 1
RESPIRATORY NEGATIVE: 1
NEUROLOGICAL NEGATIVE: 1
EYES NEGATIVE: 1
CARDIOVASCULAR NEGATIVE: 1

## 2025-08-14 LAB
ANION GAP SERPL CALC-SCNC: 9 MMOL/L (ref 7–16)
BUN SERPL-MCNC: 9 MG/DL (ref 8–22)
CALCIUM SERPL-MCNC: 8.5 MG/DL (ref 8.5–10.5)
CHLORIDE SERPL-SCNC: 111 MMOL/L (ref 96–112)
CO2 SERPL-SCNC: 23 MMOL/L (ref 20–33)
CREAT SERPL-MCNC: 0.64 MG/DL (ref 0.5–1.4)
ERYTHROCYTE [DISTWIDTH] IN BLOOD BY AUTOMATED COUNT: 55.8 FL (ref 35.9–50)
GFR SERPLBLD CREATININE-BSD FMLA CKD-EPI: 99 ML/MIN/1.73 M 2
GLUCOSE SERPL-MCNC: 93 MG/DL (ref 65–99)
HCT VFR BLD AUTO: 36.8 % (ref 42–52)
HGB BLD-MCNC: 11.7 G/DL (ref 14–18)
MCH RBC QN AUTO: 33.1 PG (ref 27–33)
MCHC RBC AUTO-ENTMCNC: 31.8 G/DL (ref 32.3–36.5)
MCV RBC AUTO: 104 FL (ref 81.4–97.8)
PLATELET # BLD AUTO: 138 K/UL (ref 164–446)
PMV BLD AUTO: 8.2 FL (ref 9–12.9)
POTASSIUM SERPL-SCNC: 4 MMOL/L (ref 3.6–5.5)
RBC # BLD AUTO: 3.54 M/UL (ref 4.7–6.1)
SODIUM SERPL-SCNC: 143 MMOL/L (ref 135–145)
WBC # BLD AUTO: 5.1 K/UL (ref 4.8–10.8)

## 2025-08-14 PROCEDURE — 85027 COMPLETE CBC AUTOMATED: CPT

## 2025-08-14 PROCEDURE — 99232 SBSQ HOSP IP/OBS MODERATE 35: CPT | Performed by: INTERNAL MEDICINE

## 2025-08-14 PROCEDURE — 94760 N-INVAS EAR/PLS OXIMETRY 1: CPT

## 2025-08-14 PROCEDURE — 700102 HCHG RX REV CODE 250 W/ 637 OVERRIDE(OP): Performed by: HOSPITALIST

## 2025-08-14 PROCEDURE — 700101 HCHG RX REV CODE 250: Performed by: HOSPITALIST

## 2025-08-14 PROCEDURE — A9270 NON-COVERED ITEM OR SERVICE: HCPCS | Performed by: INTERNAL MEDICINE

## 2025-08-14 PROCEDURE — A9270 NON-COVERED ITEM OR SERVICE: HCPCS | Performed by: STUDENT IN AN ORGANIZED HEALTH CARE EDUCATION/TRAINING PROGRAM

## 2025-08-14 PROCEDURE — A9270 NON-COVERED ITEM OR SERVICE: HCPCS | Performed by: HOSPITALIST

## 2025-08-14 PROCEDURE — 770001 HCHG ROOM/CARE - MED/SURG/GYN PRIV*

## 2025-08-14 PROCEDURE — 700102 HCHG RX REV CODE 250 W/ 637 OVERRIDE(OP): Performed by: STUDENT IN AN ORGANIZED HEALTH CARE EDUCATION/TRAINING PROGRAM

## 2025-08-14 PROCEDURE — 700111 HCHG RX REV CODE 636 W/ 250 OVERRIDE (IP): Performed by: HOSPITALIST

## 2025-08-14 PROCEDURE — 36415 COLL VENOUS BLD VENIPUNCTURE: CPT

## 2025-08-14 PROCEDURE — 700102 HCHG RX REV CODE 250 W/ 637 OVERRIDE(OP): Performed by: INTERNAL MEDICINE

## 2025-08-14 PROCEDURE — 700105 HCHG RX REV CODE 258: Performed by: HOSPITALIST

## 2025-08-14 PROCEDURE — 80048 BASIC METABOLIC PNL TOTAL CA: CPT

## 2025-08-14 RX ORDER — ASCORBIC ACID 500 MG
500 TABLET ORAL DAILY
Status: DISCONTINUED | OUTPATIENT
Start: 2025-08-14 | End: 2025-08-14

## 2025-08-14 RX ORDER — ASCORBIC ACID 500 MG
500 TABLET ORAL DAILY
Status: DISCONTINUED | OUTPATIENT
Start: 2025-08-15 | End: 2025-08-15 | Stop reason: HOSPADM

## 2025-08-14 RX ORDER — FERROUS SULFATE 325(65) MG
325 TABLET ORAL
Status: DISCONTINUED | OUTPATIENT
Start: 2025-08-15 | End: 2025-08-15 | Stop reason: HOSPADM

## 2025-08-14 RX ORDER — VITAMIN B COMPLEX
1000 TABLET ORAL DAILY
Status: DISCONTINUED | OUTPATIENT
Start: 2025-08-14 | End: 2025-08-15 | Stop reason: HOSPADM

## 2025-08-14 RX ADMIN — OMEPRAZOLE 20 MG: 20 CAPSULE, DELAYED RELEASE ORAL at 17:58

## 2025-08-14 RX ADMIN — BACLOFEN 20 MG: 10 TABLET ORAL at 12:41

## 2025-08-14 RX ADMIN — DOCUSATE SODIUM 100 MG: 100 CAPSULE, LIQUID FILLED ORAL at 17:58

## 2025-08-14 RX ADMIN — POLYETHYLENE GLYCOL 3350 1 PACKET: 17 POWDER, FOR SOLUTION ORAL at 05:40

## 2025-08-14 RX ADMIN — SENNOSIDES 8.6 MG: 8.6 TABLET, FILM COATED ORAL at 05:40

## 2025-08-14 RX ADMIN — BACLOFEN 20 MG: 10 TABLET ORAL at 08:41

## 2025-08-14 RX ADMIN — Medication 1000 UNITS: at 17:58

## 2025-08-14 RX ADMIN — BACLOFEN 20 MG: 10 TABLET ORAL at 20:18

## 2025-08-14 RX ADMIN — BACLOFEN 20 MG: 10 TABLET ORAL at 17:58

## 2025-08-14 RX ADMIN — DOCUSATE SODIUM 100 MG: 100 CAPSULE, LIQUID FILLED ORAL at 05:40

## 2025-08-14 RX ADMIN — MEROPENEM 500 MG: 500 INJECTION, POWDER, FOR SOLUTION INTRAVENOUS at 05:45

## 2025-08-14 RX ADMIN — THERA TABS 1 TABLET: TAB at 17:58

## 2025-08-14 RX ADMIN — ASPIRIN 81 MG: 81 TABLET, COATED ORAL at 05:40

## 2025-08-14 RX ADMIN — DICLOFENAC SODIUM 2 G: 10 GEL TOPICAL at 00:04

## 2025-08-14 RX ADMIN — OMEPRAZOLE 20 MG: 20 CAPSULE, DELAYED RELEASE ORAL at 05:40

## 2025-08-14 RX ADMIN — OXYBUTYNIN CHLORIDE 10 MG: 5 TABLET, EXTENDED RELEASE ORAL at 05:40

## 2025-08-14 RX ADMIN — Medication 400 MG: at 05:40

## 2025-08-14 RX ADMIN — SENNOSIDES 8.6 MG: 8.6 TABLET, FILM COATED ORAL at 20:17

## 2025-08-14 RX ADMIN — DICLOFENAC SODIUM 2 G: 10 GEL TOPICAL at 17:58

## 2025-08-14 RX ADMIN — AMOXICILLIN AND CLAVULANATE POTASSIUM 1 TABLET: 875; 125 TABLET, FILM COATED ORAL at 17:58

## 2025-08-14 RX ADMIN — AMLODIPINE BESYLATE 10 MG: 5 TABLET ORAL at 05:40

## 2025-08-14 RX ADMIN — LOSARTAN POTASSIUM 50 MG: 50 TABLET, FILM COATED ORAL at 20:18

## 2025-08-14 RX ADMIN — Medication 10 MG: at 20:27

## 2025-08-14 ASSESSMENT — ENCOUNTER SYMPTOMS
RESPIRATORY NEGATIVE: 1
GASTROINTESTINAL NEGATIVE: 1
NEUROLOGICAL NEGATIVE: 1
MUSCULOSKELETAL NEGATIVE: 1
CARDIOVASCULAR NEGATIVE: 1
EYES NEGATIVE: 1
PSYCHIATRIC NEGATIVE: 1

## 2025-08-14 ASSESSMENT — FIBROSIS 4 INDEX: FIB4 SCORE: 2.31

## 2025-08-14 ASSESSMENT — PAIN DESCRIPTION - PAIN TYPE: TYPE: ACUTE PAIN

## 2025-08-15 ENCOUNTER — PHARMACY VISIT (OUTPATIENT)
Dept: PHARMACY | Facility: MEDICAL CENTER | Age: 75
End: 2025-08-15
Payer: COMMERCIAL

## 2025-08-15 VITALS
WEIGHT: 225.75 LBS | HEART RATE: 39 BPM | RESPIRATION RATE: 16 BRPM | OXYGEN SATURATION: 95 % | SYSTOLIC BLOOD PRESSURE: 144 MMHG | TEMPERATURE: 97 F | BODY MASS INDEX: 32.32 KG/M2 | DIASTOLIC BLOOD PRESSURE: 65 MMHG | HEIGHT: 70 IN

## 2025-08-15 LAB
ANION GAP SERPL CALC-SCNC: 10 MMOL/L (ref 7–16)
BUN SERPL-MCNC: 10 MG/DL (ref 8–22)
CALCIUM SERPL-MCNC: 8.6 MG/DL (ref 8.5–10.5)
CHLORIDE SERPL-SCNC: 109 MMOL/L (ref 96–112)
CO2 SERPL-SCNC: 20 MMOL/L (ref 20–33)
CREAT SERPL-MCNC: 0.52 MG/DL (ref 0.5–1.4)
ERYTHROCYTE [DISTWIDTH] IN BLOOD BY AUTOMATED COUNT: 55.4 FL (ref 35.9–50)
GFR SERPLBLD CREATININE-BSD FMLA CKD-EPI: 105 ML/MIN/1.73 M 2
GLUCOSE SERPL-MCNC: 95 MG/DL (ref 65–99)
HCT VFR BLD AUTO: 37.6 % (ref 42–52)
HGB BLD-MCNC: 11.9 G/DL (ref 14–18)
MCH RBC QN AUTO: 33.2 PG (ref 27–33)
MCHC RBC AUTO-ENTMCNC: 31.6 G/DL (ref 32.3–36.5)
MCV RBC AUTO: 105 FL (ref 81.4–97.8)
PLATELET # BLD AUTO: 160 K/UL (ref 164–446)
PMV BLD AUTO: 8.4 FL (ref 9–12.9)
POTASSIUM SERPL-SCNC: 3.9 MMOL/L (ref 3.6–5.5)
RBC # BLD AUTO: 3.58 M/UL (ref 4.7–6.1)
SODIUM SERPL-SCNC: 139 MMOL/L (ref 135–145)
WBC # BLD AUTO: 5.1 K/UL (ref 4.8–10.8)

## 2025-08-15 PROCEDURE — 36415 COLL VENOUS BLD VENIPUNCTURE: CPT

## 2025-08-15 PROCEDURE — 700102 HCHG RX REV CODE 250 W/ 637 OVERRIDE(OP): Performed by: INTERNAL MEDICINE

## 2025-08-15 PROCEDURE — A9270 NON-COVERED ITEM OR SERVICE: HCPCS | Performed by: STUDENT IN AN ORGANIZED HEALTH CARE EDUCATION/TRAINING PROGRAM

## 2025-08-15 PROCEDURE — 80048 BASIC METABOLIC PNL TOTAL CA: CPT

## 2025-08-15 PROCEDURE — A9270 NON-COVERED ITEM OR SERVICE: HCPCS | Performed by: HOSPITALIST

## 2025-08-15 PROCEDURE — 85027 COMPLETE CBC AUTOMATED: CPT

## 2025-08-15 PROCEDURE — 700102 HCHG RX REV CODE 250 W/ 637 OVERRIDE(OP): Performed by: HOSPITALIST

## 2025-08-15 PROCEDURE — RXMED WILLOW AMBULATORY MEDICATION CHARGE: Performed by: INTERNAL MEDICINE

## 2025-08-15 PROCEDURE — 99239 HOSP IP/OBS DSCHRG MGMT >30: CPT | Performed by: INTERNAL MEDICINE

## 2025-08-15 PROCEDURE — 700102 HCHG RX REV CODE 250 W/ 637 OVERRIDE(OP): Performed by: STUDENT IN AN ORGANIZED HEALTH CARE EDUCATION/TRAINING PROGRAM

## 2025-08-15 PROCEDURE — A9270 NON-COVERED ITEM OR SERVICE: HCPCS | Performed by: INTERNAL MEDICINE

## 2025-08-15 RX ADMIN — OXYBUTYNIN CHLORIDE 10 MG: 5 TABLET, EXTENDED RELEASE ORAL at 05:26

## 2025-08-15 RX ADMIN — AMLODIPINE BESYLATE 10 MG: 5 TABLET ORAL at 05:27

## 2025-08-15 RX ADMIN — OXYCODONE HYDROCHLORIDE AND ACETAMINOPHEN 500 MG: 500 TABLET ORAL at 05:27

## 2025-08-15 RX ADMIN — AMOXICILLIN AND CLAVULANATE POTASSIUM 1 TABLET: 875; 125 TABLET, FILM COATED ORAL at 05:27

## 2025-08-15 RX ADMIN — BACLOFEN 20 MG: 10 TABLET ORAL at 05:27

## 2025-08-15 RX ADMIN — THERA TABS 1 TABLET: TAB at 05:26

## 2025-08-15 RX ADMIN — DOCUSATE SODIUM 100 MG: 100 CAPSULE, LIQUID FILLED ORAL at 05:26

## 2025-08-15 RX ADMIN — Medication 1000 UNITS: at 05:26

## 2025-08-15 RX ADMIN — ASPIRIN 81 MG: 81 TABLET, COATED ORAL at 05:27

## 2025-08-15 RX ADMIN — Medication 400 MG: at 05:27

## 2025-08-15 RX ADMIN — FERROUS SULFATE TAB 325 MG (65 MG ELEMENTAL FE) 325 MG: 325 (65 FE) TAB at 08:10

## 2025-08-15 RX ADMIN — OMEPRAZOLE 20 MG: 20 CAPSULE, DELAYED RELEASE ORAL at 05:27

## 2025-08-15 RX ADMIN — BACLOFEN 20 MG: 10 TABLET ORAL at 12:15

## 2025-08-15 RX ADMIN — SENNOSIDES 8.6 MG: 8.6 TABLET, FILM COATED ORAL at 05:27

## 2025-08-15 RX ADMIN — POLYETHYLENE GLYCOL 3350 1 PACKET: 17 POWDER, FOR SOLUTION ORAL at 05:24

## 2025-08-15 ASSESSMENT — FIBROSIS 4 INDEX: FIB4 SCORE: 1.99

## 2025-08-15 ASSESSMENT — PAIN DESCRIPTION - PAIN TYPE: TYPE: ACUTE PAIN

## 2025-08-20 ENCOUNTER — OFFICE VISIT (OUTPATIENT)
Dept: WOUND CARE | Facility: MEDICAL CENTER | Age: 75
End: 2025-08-20
Payer: MEDICARE

## 2025-08-20 DIAGNOSIS — G82.54 QUADRIPLEGIA, C5-C7, INCOMPLETE (HCC): ICD-10-CM

## 2025-08-20 DIAGNOSIS — M86.18 OTHER ACUTE OSTEOMYELITIS, OTHER SITE (HCC): ICD-10-CM

## 2025-08-20 DIAGNOSIS — L89.324 PRESSURE INJURY OF LEFT ISCHIUM, STAGE 4 (HCC): ICD-10-CM

## 2025-08-20 DIAGNOSIS — L89.314 PRESSURE INJURY OF RIGHT ISCHIUM, STAGE 4 (HCC): Primary | ICD-10-CM

## 2025-08-20 PROCEDURE — 11046 DBRDMT MUSC&/FSCA EA ADDL: CPT | Performed by: STUDENT IN AN ORGANIZED HEALTH CARE EDUCATION/TRAINING PROGRAM

## 2025-08-20 PROCEDURE — 11042 DBRDMT SUBQ TIS 1ST 20SQCM/<: CPT

## 2025-08-20 PROCEDURE — 99213 OFFICE O/P EST LOW 20 MIN: CPT

## 2025-08-20 PROCEDURE — 97605 NEG PRS WND THER DME<=50SQCM: CPT

## 2025-08-20 PROCEDURE — 11043 DBRDMT MUSC&/FSCA 1ST 20/<: CPT

## 2025-08-20 PROCEDURE — 11046 DBRDMT MUSC&/FSCA EA ADDL: CPT

## 2025-08-20 PROCEDURE — 11043 DBRDMT MUSC&/FSCA 1ST 20/<: CPT | Performed by: STUDENT IN AN ORGANIZED HEALTH CARE EDUCATION/TRAINING PROGRAM

## 2025-08-27 ENCOUNTER — OFFICE VISIT (OUTPATIENT)
Dept: WOUND CARE | Facility: MEDICAL CENTER | Age: 75
End: 2025-08-27
Payer: MEDICARE

## 2025-08-27 ENCOUNTER — TELEPHONE (OUTPATIENT)
Dept: WOUND CARE | Facility: MEDICAL CENTER | Age: 75
End: 2025-08-27
Payer: MEDICARE

## 2025-08-27 DIAGNOSIS — G82.54 QUADRIPLEGIA, C5-C7, INCOMPLETE (HCC): ICD-10-CM

## 2025-08-27 DIAGNOSIS — L89.324 PRESSURE INJURY OF LEFT ISCHIUM, STAGE 4 (HCC): ICD-10-CM

## 2025-08-27 DIAGNOSIS — L89.314 PRESSURE INJURY OF RIGHT ISCHIUM, STAGE 4 (HCC): Primary | ICD-10-CM

## 2025-08-27 DIAGNOSIS — M86.18 OTHER ACUTE OSTEOMYELITIS, OTHER SITE (HCC): ICD-10-CM

## 2025-08-27 PROCEDURE — 11045 DBRDMT SUBQ TISS EACH ADDL: CPT

## 2025-08-27 PROCEDURE — 99213 OFFICE O/P EST LOW 20 MIN: CPT

## 2025-08-27 PROCEDURE — 11043 DBRDMT MUSC&/FSCA 1ST 20/<: CPT

## 2025-08-27 PROCEDURE — 11046 DBRDMT MUSC&/FSCA EA ADDL: CPT

## 2025-08-27 PROCEDURE — 11042 DBRDMT SUBQ TIS 1ST 20SQCM/<: CPT

## 2025-08-29 ENCOUNTER — TELEPHONE (OUTPATIENT)
Dept: CARDIOLOGY | Facility: MEDICAL CENTER | Age: 75
End: 2025-08-29
Payer: MEDICARE

## (undated) DEVICE — GLOVE BIOGEL INDICATOR SZ 7SURGICAL PF LTX - (50/BX 4BX/CA)

## (undated) DEVICE — GOWN WARMING STANDARD FLEX - (30/CA)

## (undated) DEVICE — SUTURE 0 VICRYL PLUS CT-1 - 36 INCH (36/BX)

## (undated) DEVICE — KIT ANESTHESIA W/CIRCUIT & 3/LT BAG W/FILTER (20EA/CA)

## (undated) DEVICE — LACTATED RINGERS INJ 1000 ML - (14EA/CA 60CA/PF)

## (undated) DEVICE — BANDAGE ROLL STERILE BULKEE 4.5 IN X 4 YD (100EA/CA)

## (undated) DEVICE — BLADE SURGICAL #15 - (50/BX 3BX/CA)

## (undated) DEVICE — CHLORAPREP 26 ML APPLICATOR - ORANGE TINT(25/CA)

## (undated) DEVICE — CONTAINER SPECIMEN BAG OR - STERILE 4 OZ W/LID (100EA/CA)

## (undated) DEVICE — CLOSURE SKIN STRIP 1/2 X 4 IN - (STERI STRIP) (50/BX 4BX/CA)

## (undated) DEVICE — ELECTRODE DUAL RETURN W/ CORD - (50/PK)

## (undated) DEVICE — PAD LAP STERILE 18 X 18 - (5/PK 40PK/CA)

## (undated) DEVICE — DRAPE LARGE 3 QUARTER - (20/CA)

## (undated) DEVICE — GLOVE BIOGEL ECLIPSE PF LATEX SIZE 7.5

## (undated) DEVICE — SUTURE 0 COATED VICRYL 6-18IN - (12PK/BX)

## (undated) DEVICE — DRAPE LAPAROTOMY T SHEET - (12EA/CA)

## (undated) DEVICE — WATER IRRIG. STER 3000 ML - (4/CA)

## (undated) DEVICE — JELLY SURGILUBE STERILE FOIL 5 GM (150EA/BX)

## (undated) DEVICE — SLEEVE, VASO, THIGH, MED

## (undated) DEVICE — SENSOR SPO2 NEO LNCS ADHESIVE (20/BX) SEE USER NOTES

## (undated) DEVICE — SUTURE 2-0 VICRYL PLUS SH - 8 X 18 INCH (12/BX)

## (undated) DEVICE — SET EXTENSION WITH 2 PORTS (48EA/CA) ***PART #2C8610 IS A SUBSTITUTE*****

## (undated) DEVICE — STAPLER SKIN DISP - (6/BX 10BX/CA) VISISTAT

## (undated) DEVICE — PACK MAJOR BASIN - (2EA/CA)

## (undated) DEVICE — SODIUM CHL IRRIGATION 0.9% 1000ML (12EA/CA)

## (undated) DEVICE — SET LEADWIRE 5 LEAD BEDSIDE DISPOSABLE ECG (1SET OF 5/EA)

## (undated) DEVICE — SWAB CULTURE AMIES ESWAB (50EA/PK)

## (undated) DEVICE — ADAPTOR UROLOK - 10/BX

## (undated) DEVICE — SYRINGE 10 ML CONTROL LL (25EA/BX 4BX/CA)

## (undated) DEVICE — TOWELS CLOTH SURGICAL - (4/PK 20PK/CA)

## (undated) DEVICE — TUBE E-T HI-LO CUFF 7.5MM (10EA/PK)

## (undated) DEVICE — TOWEL STOP TIMEOUT SAFETY FLAG (40EA/CA)

## (undated) DEVICE — STAPLE 75MM LINEAR (12EA/BX)

## (undated) DEVICE — BOVIE BLADE COATED - (50/PK)

## (undated) DEVICE — SUCTION INSTRUMENT YANKAUER BULBOUS TIP W/O VENT (50EA/CA)

## (undated) DEVICE — DERMABOND ADVANCED - (12EA/BX)

## (undated) DEVICE — SUTURE 2-0 VICRYL PLUS TP-1 - (24/BX)

## (undated) DEVICE — GLOVE BIOGEL SZ 8 SURGICAL PF LTX - (50PR/BX 4BX/CA)

## (undated) DEVICE — SUTURE 2-0 ETHILON FS - (36/BX) 18 INCH

## (undated) DEVICE — PACK UPPER EXTREMITY (2EA/CA)

## (undated) DEVICE — SUTURE GENERAL

## (undated) DEVICE — SUTURE 2-0 MONOCRYL CT-1

## (undated) DEVICE — TRAY SRGPRP PVP IOD WT PRP - (20/CA)

## (undated) DEVICE — GOWN SURGEONS X-LARGE - DISP. (30/CA)

## (undated) DEVICE — KIT EVACUATER 3 SPRING PVC LF 1/8 DRAIN SIZE (10EA/CA)"

## (undated) DEVICE — TUBING CLEARLINK DUO-VENT - C-FLO (48EA/CA)

## (undated) DEVICE — PACK MINOR BASIN - (2EA/CA)

## (undated) DEVICE — SUTURE 1 VICRYL PLUS CT-1 - 18 INCH (12/BX)

## (undated) DEVICE — MASK ANESTHESIA ADULT  - (100/CA)

## (undated) DEVICE — GOWN SURGICAL X-LARGE ULTRA - FILM-REINFORCED (20/CA)

## (undated) DEVICE — CANISTER SUCTION 3000ML MECHANICAL FILTER AUTO SHUTOFF MEDI-VAC NONSTERILE LF DISP  (40EA/CA)

## (undated) DEVICE — GLOVE BIOGEL SZ 7 SURGICAL PF LTX - (50PR/BX 4BX/CA)

## (undated) DEVICE — GLOVE BIOGEL SZ 7.5 SURGICAL PF LTX - (50PR/BX 4BX/CA)

## (undated) DEVICE — FIBER LASER MOSES 200 UM (1/EA)

## (undated) DEVICE — NEEDLE NON SAFETY HYPO 22 GA X 1 1/2 IN (100/BX)

## (undated) DEVICE — ZIPWIRE .038 STRAIGHT BENTSON TIP (5EA/BX)

## (undated) DEVICE — HEAD HOLDER JUNIOR/ADULT

## (undated) DEVICE — BAG SPONGE COUNT 10.25 X 32 - BLUE (250/CA)

## (undated) DEVICE — DRAPE UNDER BUTTOCK LRG (40/CA)

## (undated) DEVICE — SODIUM CHL. IRRIGATION 0.9% 3000ML (4EA/CA 65CA/PF)

## (undated) DEVICE — SOD. CHL. INJ. 0.9% 1000 ML - (14EA/CA 60CA/PF)

## (undated) DEVICE — STAPLER 75MM LINEAR OPEN (3EA/BX)

## (undated) DEVICE — GLOVE, BIOGEL ECLIPSE, SZ 7.0, PF LTX (50/BX)

## (undated) DEVICE — NEPTUNE 4 PORT MANIFOLD - (20/PK)

## (undated) DEVICE — WATER IRRIGATION STERILE 1000ML (12EA/CA)

## (undated) DEVICE — STOCKINET BIAS 6 IN STERILE - (20/CA)

## (undated) DEVICE — DRESSING 3X8 ADAPTIC GAUZE - NON-ADHERING (36/PK 6PK/BX)

## (undated) DEVICE — LEGGING LITHOTOMY 31 X 48 IN - (2EA/PK 20PK/CA)

## (undated) DEVICE — TRAY SKIN SCRUB PVP WET (20EA/CA) PART #DYND70356 DISCONTINUED

## (undated) DEVICE — HANDPIECE 10FT INTPLS SCT PLS IRRIGATION HAND CONTROL SET (6/PK)

## (undated) DEVICE — ELECTRODE 850 FOAM ADHESIVE - HYDROGEL RADIOTRNSPRNT (50/PK)

## (undated) DEVICE — SUTURE 3-0 MONOCRYL PLUS PS-1 - 27 INCH (36/BX)

## (undated) DEVICE — KIT ROOM DECONTAMINATION

## (undated) DEVICE — BOVIE  BLADE 6 EXTENDED - (50/PK)

## (undated) DEVICE — CONNECTOR HOSE NEPTUNE FOR CYSTO ROOM

## (undated) DEVICE — SLEEVE VASO CALF MED - (10PR/CA)

## (undated) DEVICE — SPONGE GAUZESTER 4 X 4 4PLY - (128PK/CA)

## (undated) DEVICE — SUTURE 0 VICRYL PLUS CT-1 - 8 X 18 INCH (12/BX)

## (undated) DEVICE — PROTECTOR ULNA NERVE - (36PR/CA)

## (undated) DEVICE — HEADREST PRONEVIEW LARGE - (10/CA)

## (undated) DEVICE — TUBE CONNECT SUCTION CLEAR 120 X 1/4" (50EA/CA)"

## (undated) DEVICE — SYRINGE DISP. 12 CC LL - (100/BX)

## (undated) DEVICE — PENCIL ELECTSURG 10FT BTN SWH - (50/CA)

## (undated) DEVICE — PACK LOWER EXTREMITY - (2/CA)

## (undated) DEVICE — GLOVE BIOGEL PI INDICATOR SZ 6.5 SURGICAL PF LF - (50/BX 4BX/CA)

## (undated) DEVICE — DETERGENT RENUZYME PLUS 10 OZ PACKET (50/BX)

## (undated) DEVICE — PACK CYSTOSCOPY III - (8/CA)

## (undated) DEVICE — CANISTER SUCTION RIGID RED 1500CC (40EA/CA)

## (undated) DEVICE — SCOPE DIGITAL URETEROSCOPE DISPOSABLE

## (undated) DEVICE — PAD OR TABLE DA VINCI 2IN X 20IN X 72IN - (12EA/CA)

## (undated) DEVICE — SUTURE 2-0 VICRYL PLUS CT-1 36 (36PK/BX)"

## (undated) DEVICE — SUTURE 3-0 MONOCRYL SH (36PK/BX)

## (undated) DEVICE — JELLY, KY 2 0Z STERILE

## (undated) DEVICE — ELECTRODE 5MM LHK LAPSCP STERILE DISP- MEGADYNE  (5/CA)

## (undated) DEVICE — BAG URODRAIN WITH TUBING - (20/CA)

## (undated) DEVICE — COVER LIGHT HANDLE FLEXIBLE - SOFT (2EA/PK 80PK/CA)

## (undated) DEVICE — SUTURE 3-0 ETHILON FS-1 - (36/BX) 30 INCH

## (undated) DEVICE — SWAB ANAEROBIC SPEC.COLLECTOR - (25/PK 4PK/CA 100EA/CA)

## (undated) DEVICE — GLOVE SZ 6 BIOGEL PI MICRO - PF LF (50PR/BX 4BX/CA)

## (undated) DEVICE — PADDING CAST 6 IN STERILE - 6 X 4 YDS (24/CA)

## (undated) DEVICE — Device

## (undated) DEVICE — BASKET ZERO 1.9FR X 120CM

## (undated) DEVICE — SET IRRIGATION CYSTOSCOPY Y-TYPE L81 IN (20EA/CA)

## (undated) DEVICE — SCISSORS 5MM CVD (6EA/BX)

## (undated) DEVICE — SUTURE 1 VICRYL PLUS CTX - 36 INCH (36/BX)

## (undated) DEVICE — GLOVE BIOGEL INDICATOR SZ 8 SURGICAL PF LTX - (50/BX 4BX/CA)

## (undated) DEVICE — TIP INTPLS HFLO ML ORFC BTRY - (12/CS)  FOR SURGILAV

## (undated) DEVICE — ARMREST CRADLE FOAM - (2PR/PK 12PR/CA)

## (undated) DEVICE — WRAP CO-FLEX 4IN X 5YD STERIL - SELF-ADHERENT (18/CA)

## (undated) DEVICE — HUMID-VENT HEAT AND MOISTURE EXCHANGE- (50/BX)

## (undated) DEVICE — GLOVE, LITE (PAIR)

## (undated) DEVICE — TOURNIQUET CUFF 24 X 4 ONE PORT - STERILE (10/BX)

## (undated) DEVICE — WRAP COBAN SELF-ADHERENT 6 IN X  5YDS STERILE TAN (12/CA)

## (undated) DEVICE — SYRINGE 50ML CATHETER TIP (40EA/BX 4BX/CA)

## (undated) DEVICE — BRIEF STRETCH MATERNITY M/L - FITS 20-60IN (5EA/BG 20BG/CA)

## (undated) DEVICE — DRAPE TABLE UROLOGY DRAINAGE (20EA/BX)

## (undated) DEVICE — SET IRRIGATION CYSTOSCOPY TUBE L80 IN (20EA/CA)

## (undated) DEVICE — CATHETER URET DUAL LUMEN

## (undated) DEVICE — CLEANER ELECTRO-SURGICAL TIP - (25/BX 4BX/CA)

## (undated) DEVICE — JELLY SURGILUBE STERILE TUBE 4.25 OZ (1/EA)

## (undated) DEVICE — GLOVE SZ 7 BIOGEL PI MICRO - PF LF (50PR/BX 4BX/CA)

## (undated) DEVICE — SUTURE 3-0 VICRYL PLUS SH - 8X 18 INCH (12/BX)

## (undated) DEVICE — GUARD SPLASH FOR PULSEVAC (12EA/PK)

## (undated) DEVICE — SHEATH NAVIGATOR 11/13 X 36 URETERAL ACCESS (5EA/BX)

## (undated) DEVICE — SUTURE 2-0 VICRYL PLUS CT-1 - 8 X 18 INCH(12/BX)

## (undated) DEVICE — CATHETER URETHRAL OPEN END AXXCESS (10EA/BX)

## (undated) DEVICE — PACK CYSTO III (2EA/CA)

## (undated) DEVICE — DRAPE SURGICAL U 77X120 - (10/CA)

## (undated) DEVICE — GLOVE BIOGEL INDICATOR SZ 8.5 SURGICAL PF LTX - (50/BX 4BX/CA)

## (undated) DEVICE — DRESSING ABDOMINAL PAD STERILE 8 X 10" (360EA/CA)"

## (undated) DEVICE — TUBE E-T HI-LO CUFF 7.0MM (10EA/PK)

## (undated) DEVICE — GLOVE SIZE 7.0 SURGEON ACCELERATOR FREE GREEN (50PR/BX 4BX/CA)

## (undated) DEVICE — SUTURE 4-0 MONOCRYL PLUS PS-1 - 27 INCH (36/BX)

## (undated) DEVICE — BOVIE BLADE COATED &INSULATED - 25/PK

## (undated) DEVICE — GLOVE SZ 6.5 BIOGEL PI MICRO - PF LF (50PR/BX)

## (undated) DEVICE — SUCTION INSTRUMENT YANKAUER OPEN TIP W/O VENT (50EA/CA)

## (undated) DEVICE — DRAPE MAYO STAND - (30/CA)

## (undated) DEVICE — GAUZE FLUFF STERILE 2-PLY 36 X 36 (100EA/CA)

## (undated) DEVICE — GLOVE BIOGEL PI ORTHO SZ 7.5 PF LF (40PR/BX)

## (undated) DEVICE — SHEATH NAVIGATOR 11/13 X 46 URETERAL ACCESS (5EA/BX)

## (undated) DEVICE — TUBE CONNECTING SUCTION - CLEAR PLASTIC STERILE 72 IN (50EA/CA)

## (undated) DEVICE — STERI STRIP COMPOUND BENZOIN - TINCTURE 0.6ML WITH APPLICATOR (40EA/BX)

## (undated) DEVICE — BLADE SURGICAL #11 - (50/BX)

## (undated) DEVICE — WIRE GUIDE SENSOR DUAL FLEX - 5/BX

## (undated) DEVICE — SUTURE ETHILON 2-0 FSLX 30 (36PK/BX)"